# Patient Record
Sex: FEMALE | Race: WHITE | NOT HISPANIC OR LATINO | Employment: OTHER | URBAN - METROPOLITAN AREA
[De-identification: names, ages, dates, MRNs, and addresses within clinical notes are randomized per-mention and may not be internally consistent; named-entity substitution may affect disease eponyms.]

---

## 2017-07-26 DIAGNOSIS — G40.909 EPILEPSY WITHOUT STATUS EPILEPTICUS, NOT INTRACTABLE (HCC): ICD-10-CM

## 2017-08-15 ENCOUNTER — ALLSCRIPTS OFFICE VISIT (OUTPATIENT)
Dept: OTHER | Facility: OTHER | Age: 52
End: 2017-08-15

## 2017-08-17 ENCOUNTER — GENERIC CONVERSION - ENCOUNTER (OUTPATIENT)
Dept: OTHER | Facility: OTHER | Age: 52
End: 2017-08-17

## 2017-08-17 LAB
BASOPHILS # BLD AUTO: 0.1 X10E3/UL (ref 0–0.2)
BASOPHILS # BLD AUTO: 1 %
DEPRECATED RDW RBC AUTO: 13.4 % (ref 12.3–15.4)
EOSINOPHIL # BLD AUTO: 0.3 X10E3/UL (ref 0–0.4)
EOSINOPHIL # BLD AUTO: 4 %
HCT VFR BLD AUTO: 42.9 % (ref 34–46.6)
HGB BLD-MCNC: 14.3 G/DL (ref 11.1–15.9)
IMM.GRANULOCYTES (CD4/8) (HISTORICAL): 0.1 X10E3/UL (ref 0–0.1)
IMM.GRANULOCYTES (CD4/8) (HISTORICAL): 1 %
LYMPHOCYTES # BLD AUTO: 2 X10E3/UL (ref 0.7–3.1)
LYMPHOCYTES # BLD AUTO: 26 %
MCH RBC QN AUTO: 29.5 PG (ref 26.6–33)
MCHC RBC AUTO-ENTMCNC: 33.3 G/DL (ref 31.5–35.7)
MCV RBC AUTO: 89 FL (ref 79–97)
MONOCYTES # BLD AUTO: 0.3 X10E3/UL (ref 0.1–0.9)
MONOCYTES (HISTORICAL): 4 %
NEUTROPHILS # BLD AUTO: 4.9 X10E3/UL (ref 1.4–7)
NEUTROPHILS # BLD AUTO: 64 %
PLATELET # BLD AUTO: 324 X10E3/UL (ref 150–379)
RBC (HISTORICAL): 4.85 X10E6/UL (ref 3.77–5.28)
WBC # BLD AUTO: 7.6 X10E3/UL (ref 3.4–10.8)

## 2017-08-18 LAB
A/G RATIO (HISTORICAL): 1.7 (ref 1.2–2.2)
ALBUMIN SERPL BCP-MCNC: 4.8 G/DL (ref 3.5–5.5)
ALP SERPL-CCNC: 129 IU/L (ref 39–117)
ALT SERPL W P-5'-P-CCNC: 23 IU/L (ref 0–32)
AST SERPL W P-5'-P-CCNC: 21 IU/L (ref 0–40)
BILIRUB SERPL-MCNC: 0.2 MG/DL (ref 0–1.2)
BUN SERPL-MCNC: 14 MG/DL (ref 6–24)
BUN/CREA RATIO (HISTORICAL): 17 (ref 9–23)
CALCIUM SERPL-MCNC: 10.1 MG/DL (ref 8.7–10.2)
CHLORIDE SERPL-SCNC: 100 MMOL/L (ref 96–106)
CO2 SERPL-SCNC: 24 MMOL/L (ref 18–29)
CREAT SERPL-MCNC: 0.83 MG/DL (ref 0.57–1)
EGFR AFRICAN AMERICAN (HISTORICAL): 94 ML/MIN/1.73
EGFR-AMERICAN CALC (HISTORICAL): 82 ML/MIN/1.73
GLUCOSE SERPL-MCNC: 139 MG/DL (ref 65–99)
POTASSIUM SERPL-SCNC: 4.7 MMOL/L (ref 3.5–5.2)
SODIUM SERPL-SCNC: 142 MMOL/L (ref 134–144)
TOT. GLOBULIN, SERUM (HISTORICAL): 2.9 G/DL (ref 1.5–4.5)
TOTAL PROTEIN (HISTORICAL): 7.7 G/DL (ref 6–8.5)

## 2017-08-19 LAB — LAMOTRIGINE LEVEL (HISTORICAL): 9.1 UG/ML (ref 2–20)

## 2017-08-20 LAB — OXCARBAZEPINE (HISTORICAL): 12 UG/ML (ref 10–35)

## 2017-10-26 ENCOUNTER — ALLSCRIPTS OFFICE VISIT (OUTPATIENT)
Dept: OTHER | Facility: OTHER | Age: 52
End: 2017-10-26

## 2017-11-09 DIAGNOSIS — G40.119 LOCALZ-RLTD SYMPTOMATIC EPILEPSY W SMPL PART SZ, INTRACT, WO STATUS (HCC): ICD-10-CM

## 2017-11-13 ENCOUNTER — GENERIC CONVERSION - ENCOUNTER (OUTPATIENT)
Dept: OTHER | Facility: OTHER | Age: 52
End: 2017-11-13

## 2017-11-14 LAB — SODIUM SERPL-SCNC: 142 MMOL/L (ref 134–144)

## 2017-11-15 LAB
LAMOTRIGINE LEVEL (HISTORICAL): 8.2 UG/ML (ref 2–20)
OXCARBAZEPINE (HISTORICAL): 9 UG/ML (ref 10–35)
TOPIRAMATE LEVEL (HISTORICAL): 3 UG/ML (ref 2–25)

## 2017-11-16 LAB
BACTERIA UR QL AUTO: ABNORMAL
BILIRUB UR QL STRIP: NEGATIVE
COLOR UR: YELLOW
COMMENT (HISTORICAL): CLEAR
CULTURE RESULT (HISTORICAL): ABNORMAL
FECAL OCCULT BLOOD DIAGNOSTIC (HISTORICAL): NEGATIVE
GLUCOSE (HISTORICAL): NEGATIVE
KETONES UR STRIP-MCNC: NEGATIVE MG/DL
LEUKOCYTE ESTERASE UR QL STRIP: ABNORMAL
MICROSCOPIC EXAMINATION (HISTORICAL): ABNORMAL
MISCELLANEOUS LAB TEST RESULT (HISTORICAL): ABNORMAL
MUCUS THREADS (HISTORICAL): PRESENT
NITRITE UR QL STRIP: POSITIVE
NON-SQ EPI CELLS URNS QL MICRO: ABNORMAL /HPF
PH UR STRIP.AUTO: 6.5 [PH] (ref 5–7.5)
PROT UR STRIP-MCNC: NEGATIVE MG/DL
RBC (HISTORICAL): ABNORMAL /HPF
SP GR UR STRIP.AUTO: 1.01 (ref 1–1.03)
URINALYSIS (UA) (HISTORICAL): ABNORMAL
UROBILINOGEN UR QL STRIP.AUTO: 0.2 EU/DL (ref 0.2–1)
WBC # BLD AUTO: >30 /HPF

## 2017-11-21 ENCOUNTER — GENERIC CONVERSION - ENCOUNTER (OUTPATIENT)
Dept: OTHER | Facility: OTHER | Age: 52
End: 2017-11-21

## 2017-11-22 NOTE — PROGRESS NOTES
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Y #[id number] Last Updated By: System, [place]; 10/26/2017 12:39:15 PM   · Start: Topiramate 25 MG Oral Tablet; take 2 tablet twice daily  Rx By: Carole Guadarrama; Dispense: 90 Days ; #:360 Tablet; Refill: 3;For: Intractable localization-related epilepsy; JAYLA = N; Print Rx   · (1) LAMICTAL; Status:Active; Requested UYN:[id number];   Perform:[facility]; QVO:[id number]; Ordered; For:Intractable localization-related epilepsy; Ordered By:DePadua, Lizbeth;   · (1) OXCARBAZEPINE ( TRILEPTAL); Status:Active; Requested ILW:[id number];   Perform:[facility]; ETN:[id number]; Ordered; For:Intractable localization-related epilepsy; Ordered By:DePadua, Lizbeth;   · (1) SODIUM; Status:Active; Requested YHF:[id number];   Perform:[facility]; GZN:[id number]; Ordered; For:Intractable localization-related epilepsy; Ordered By:DePadua, Lizbeth;   · (1) TOPIRAMATE; Status:Active; Requested HIT:[id number];   Perform:[facility]; CIM:[id number]; Ordered; For:Intractable localization-related epilepsy; Ordered By:DePadua, Jennifer;   · (1) URINALYSIS (will reflex a microscopy if leukocytes, occult blood, protein or nitrites arenot within normal limits); Status:Active; Requested HEIDE:[id number];   Perform:[facility]; GZA:[id number]; Ordered; For:Intractable localization-related epilepsy;  Ordered By:DePadua, Lizbeth;   · Follow-up visit in 2 months Evaluation and Treatment  Follow-up  Status: Complete Done: 80VFR0299  Ordered; For: Intractable localization-related epilepsy; Ordered By: Adriana Boxer  Performed:   Due: 27FXF4622; Last Updated By: Radha Sim; 10/26/2017 12:46:55 PM    Discussion/Summary  Discussion Summary:   Very pleasant 47year old who developed localization-related epilepsy in conjunction with pregnancy when she was 33years old  We have the records going back to 2014, but not the records from before that  She had a left temporal lobe resection at [address] [place] [address] in 2007 so presumably, she had an extensive pre-surgical evaluation prior to surgery  The surgery was successful in that she was seizure free for a few years and it eliminated her generalized tonic clonic seizures, but she has continued to have 1-2 complex partial seizures a year plus 1-2 simple partial seizures (âaurasâ) a year, as well  Available records and patientâs statements indicate she had been on carbamazepine, Topamax, and pregabalin in the past  She recalls Topamax having been ineffective but it was used prior to her surgery  She liked the side effects of Topamax in that it helped her to lose weightShe has been on Lamictal since 2013, and it has been helpful though likely cannot be increased beyond her current dose of 200 mg BID since when she was on 400 mg BID she had a level of 18 and had intolerable dizziness  After the Lamictal was reduced, oxcarbazepine was added and seemed to have helped, as well, but that combination has not eliminated her seizures  Last AED levels from August 2017 were Lamictal level 9 1 and oxcarbazepine level 12   For oxcarbazepine to truly exert its full anti-seizure effect, level need to be at least 25, but because Lamictal and oxcarbazepine have a pharmacodynamic interaction i e , they both cause dizziness, oxcarbazepine cannot be increased beyond its current dose for as long as she is on Lamictal  As it is, the patient already has dizzy spells on the combination she is currently on  Fortunately, nearly all the seizures the patient has had since her temporal lobectomy have been nocturnal, so they have not required suspension of her driving privileges  The one exception being the last seizure that occurred in the daytime in February 2017  On the one hand, patient is pleased at the decrease in seizure frequency since her surgery and with her current medications  She does appear to suffer from some degree of depression, which is partly related to a feeling of relative isolation in she doesnât know anyone else with seizures and has no one with whom she can share concerns who would fully understand what she is experiencing first hand  I recommended that she go to the [address] [place] [address] website to find out more about the [address] [place] [address] support group or to at least make online contact with other epilepsy patients, and that she attend to the annual [address] [facility] conference so she could meet other patients with epilepsy  Unfortunately, because of where she lives, it would be difficult to attend the [facility] support group that meets once a month  As far as what can be done for her seizures there are a few options:1) It is possible that the seizures she continues to originate from the residual epileptogenic tissue in left temporal lobe, which, if it can be resected without causing language problems, could eliminate her remaining seizures  It is difficult to tell from the patientâs seizures' 1  described semiology whether they do still originate from the left side versus whether, 1  because of her continued seizures, she has kindled a new seizure focus in the right temporal lobe  The latter is more likely since she can talk during her seizures and during her postictal state  If the seizures do originate from the right temporal lobe then resective surgery would not be recommended 1  since she would lose all memory function   To even determine if she was a candidate for epilepsy surgery, not only would she require standard evaluation and video EEG monitoring with scalp electrodes, she would also require phase 2 monitoring with intracranial electrodes  Patient is not as open to that option which is understandable since she only has 2 seizures per year  2) The option that I believe should be the patientâs first choice is implantation of a vagus nerve stimulator  Having failed at least 3 seizure medications, an attempt to provide control with another is less likely to be successful than implantation of the device  It appears that the whole idea of having an implanted device to control seizures is novel to the patient  She has never heard of the device before and found the option strange, if not amusing  She is willing to look into this option and I gave her information for her to review  We can discuss this further in follow-up or if she decides on her own that she wants to proceed with implantation she can call and we can set up an appointment to see Dr Gurrola Later  3) In the meantime, there may be some benefit gained from altering her AED regimen  As noted above, the effectiveness of the dosing of Lamictal and oxcarbazepine is limited because of their pharmacodynamic interaction which limits our ability to optimize the benefit from oxcarbazepine  On the other hand, the patient mentioned that she really liked the side effects of Topamax and when she was on it in the past she lost weight and wants to lose more  It dd not work well when she tried it before, but that was before her left temporal lobe was taken out and could be of more benefit now with at least some of her epileptogenic tissue having been removed    I started her on topiramate 25 mg BID for one week then increased the dose to 50 mg twice a day thereafter  In 2 weeks she is to get AED levels drawn   Once we achieve a topiramate level of 4, I will give her instructions for gradually tapering off oxcarbazepine  Note that the patient has a history of kidney stones and topiramate can increase that risk  Patient was made aware of this, but would like to proceed with medication transition anyway  1  I will endeavor to keep her topiramate dose low and the patient promises to remain well hydrated  Urinalysis will be completed periodically so if she develops stones, we would pick it up early  I will see the patient back in about 2 months  Plan:1) Start topiramate 25 mg BID x1 week, then increase to 50 mg BID2) AED levels in 2 weeks3) Patient to review VNS literature    Counseling Documentation With Imm: The patient was counseled regarding instructions for management,-- risk factor reductions,-- prognosis,-- patient and family education,-- risks and benefits of treatment options,-- importance of compliance with treatment  total time of encounter was 100 minutes-- and-- 70 minutes was spent counseling  Time in 11:00, time out 12:40       1 Amended By: Gabi Gregory; Dec 14 2017 11:03 AM EST    Chief Complaint  Chief Complaint Free Text Note Form: Patient is here today for a follow up for her seizures      History of Present Illness  HPI: 47year old right-handed female with a history of seizures starting in 1997 and left temporal lobectomy in 2007 who was referred to me by Dr Zo Ramirez for evaluation and management of her seizure disorder  In order to arrive at the most accurate diagnosis of the nature of the patientâs episodes and determination of seizure type and epilepsy syndrome, and to develop the best possible individualized treatment plan, an extended amount of face-to-face time was necessary for reviewing the patientâs history with patient and available family members and friends   Information required included but was not limited to a detailed description of the seizures and seizure-like episodes and their response to previous treatments, of results of previous epilepsy work-up, of risk factors for epilepsy, and a discussion of the patientâs medical problems as they relate to the patientâs seizures and may affect treatment choices  Information was obtained both de elvie from the patient and available family and friends, and from review of available previous records, information from which was reviewed face-to-face with the patient for both clarification and confirmation  Patient had her first seizure in 0 when she was 33years old and pregnant with her daughter  She was not placed on any medication because she was pregnant  She continued to have a number of different types of seizures including grand mal and petit mal  In 2007, she underwent a left temporal lobectomy for seizure management at [facility]  She reports that she used to have seizures every day prior to her temporal lobectomy, then became seizure-free for several months post-op  Unfortunately, seizures eventually recurred  Fortunately, the recurrent seizures were exclusively nocturnal and were, in her words, âlittleâ  Patient had a seizure in October 2013 where she woke up feeling dizzy with blurriness of vision, she had bitten her tongue and found she had urinary incontinence  Patient felt she was back to her baseline within an hour  Lamictal was added to her medication regimen on 12/10/2013  In January 2014, she was on Lamictal 200 mg 2 tabs BID  Patient had a seizure while asleep in June 2014 lasting from a few seconds to a minute with tongue biting  Denied incontinence or injuries  On 7/18/14 the patient was on Lamictal 200 mg 2 tabs BID and level was 18 2  Lamictal was decreased to 200-400 for one week then 200 mg BID  Patient was started on oxcarbazepine 150 mg BID for one week then increased to 300 mg BID  Patient states that she noticed some memory difficulty after her left temporal lobectomy  Neuropsychological testing was performed at [facility] by Beka Phipps, PHD on 2/22/16   MMSE score was a 29/30 with no deficits noted on simple measures of comprehension, reading, or writing  Wechsler Adult Intelligence Scale-IV score was 83 which placed her within the low to average range; she notable had lower scores on tasks assessing ability to respond orally to a series of word pairs, answer questions about common events, objects, places, and people  Vocabulary, working memory, and nonverbal reasoning fell within the average range  Patient headaches strengths in perceptual reasoning  Memory testing showed mild weakness in short-term memory evident more on single exposure to complex partial seizures auditory information and on measures of visual recall  Diminished word finding, object naming, slower visual scanning but intact executive functioning  Moderate likelihood that she may have underlying attentional problems  She was recommended for outpatient speech therapy  Patient had a questionable aura of a âweird feelingâ during the night but was able to abort it and no further seizure activity occurred  She saw Dr Bulmaro Curry on 3/11/16 who increased her oxcarbazepine from 300 mg BID to 300-450  Patientâs last visit with Dr Bulmaro Curry was on 8/15/17  She reported that her last seizure had been 2-3 years prior, and no changes were made in the patientâs medications and  Today, however, contrary to what she told Dr Bulmaro Curry, the patient reports that she has had 2 seizures this year  The first occurred on 1/5/17 while she was sleeping  She knew that she had a seizure because when she woke up she had a âweird feelingâ and had confusion that lasted about 15 minutes  The last seizure she had was in February and lasted 2 minutes  She was in the kitchen when her daughter saw her starting to fall and lowered her to the floor  Patient believes it was stress related  This is the first daytime seizure that had occurred since her brain surgery  The patient states her average seizure frequency as being about 1-2 times per year Witnesses have described the patient's seizures as consisting of the patient staring off into space  She drools, and she talks but it does not make sense  Events, including a period of confusion, reportedly last 15-20 minutes  When the patient wakes up in the morning, she can only tell if she's had a seizure during the night if she awakens drooling or with a weird feeling  Patient sometimes has a feeling as if a seizure is going to happen, but the feeling does not progress to a seizure  Feelings occur both in the daytime and at night  Patient feels that if she sits down and try to relax when she gets this feeling, the seizure will not happen  She did not experience this warning sign prior to the daytime seizure that she had in February, however  Reports side effects of dizziness and weight gain on her current medications  She notes that her memory issue is bothersome to her but she knows it is due to her epilepsy and brain surgery  Seizure Types:1  Grand mal and petite mal seizures that started when she was 33years old in  when she was pregnant  These seizures have not occurred since temporal lobectomy in   Convulsions would occur occasionally and petite mals would happen daily  2  Nocturnal seizures during which patient will wake up feeling dizzy and have blurry vision as well as a âfunny feelingâ  She does not know how long it lasts due to her being asleep but she estimates that the seizure plus postictal confusion will last about 15-20 minutes  She will typically have 1-2 of these events per year  3  One daytime seizure in 2017 where she stared off into the distance, experienced drooling, she talked but it did not make sense, and she began to fall   Seizure lasted 2 minutes  Risk Factors for Epilepsy:Prenatal and  histories were normal  No history of febrile seizures  No family history of seizures  No known history of head injury   No known history of stroke, tumor, or central nervous system infection  Denies history of head trauma prior to  but has had seizures that have caused her to sustain head injury such as one time she hit her head on the wall  Seizure Triggers:Lack of sleep, stress, alcohol, a lot of sugar in her diet, caffeine (she drinks 2-4 cups per day), menstrual cycle (?)Previous Work-up:EEG on 14Impression: this is a normal routine EEG  If a seizure disorder is considered clinically a repeat tracing with sleep deprivation may be of additional diagnostic value  AED level on 11/19/15Oxcarbazepine 7Lamotrigine 13  3MRI on 12/1/15Impression: no evidence of acute intracranial abnormality  No abnormal enhancement within the brain parenchyma  Stable postoperative changes or a prior left frontotemporal craniotomy with a left temporal fossa resection cavity  The findings have not significantly changed when compared to a prior MRI of the brain dated 11  AED level on 16Oxcarbazepine 12Lamotrigine 8  4EEG on 3/21/16Impression: this is a normal routine EEG  If a seizure disorder is considered clinically a repeat tracing with sleep deprivation may be of additional diagnostic value  AED level on 17Oxcarbazepine 12Lamotrigine 9 1Current Treatment:Lamotrigine 200 mg tablets, 1 tab BIDOxcarbazepine 300 mg tablets, 1 tab in AM, 1 5 tab in PMPrevious Medical and Surgical treatments for Epilepsy:Tegretol, Topamax, and LyricaShe does not believe she tried the following: Phenobarbital, phenytoin, zonisamide, Keppra, gabapentinAllergies:No known drug allergiesPMHx:1  Kidney stones ()2  Epilepsy3  RosaceaSurgical Hx:Patient has a surgical history of brain surgery in , parathyroid resection sub-total parathyroid in  and , and tonsillectomySHx:Patient is currently on the Depo-Provera shot  Patient has three children: a 25year old, a 21year old, and an 6year old  All have developed normally  FHx:Family history of diabetes mellitus (mother) and brain cancer (father, ) Review of Systems  Neurological ROS:  Constitutional: as noted in HPI  HEENT: as noted in HPI  Cardiovascular: as noted in HPI  Respiratory: as noted in HPI  Gastrointestinal: as noted in HPI  Genitourinary: as noted in HPI  Musculoskeletal: as noted in HPI  Integumentary as noted in HPI  Psychiatric: as noted in HPI  Endocrine as noted in HPI  Hematologic/Lymphatic: as noted in HPI  Neurological General: as noted in HPI  Neurological Mental Status: as noted in HPI  Neurological Cranial Nerves: as noted in HPI  Neurological Motor findings include: as noted in HPI  Neurological Coordination: as noted in HPI  Neurological Sensory: as noted in HPI  Neurological Gait: as noted in HPI  ROS Reviewed:   ROS reviewed  Active Problems  1  Contact dermatitis due to chemicals (692 4) (L25 3)  2  Encounter for procedure  3  Epilepsy (345 90) (G40 909)  4  Rosacea (695 3) (L71 9)  5  Screening for skin condition (V82 0) (Z13 89)  6  Verrucae vulgaris (078 10) (B07 9)    Past Medical History  1  History of kidney stones (V13 01) (G44 595)  Active Problems And Past Medical History Reviewed: The active problems and past medical history were reviewed and updated today  Surgical History  1  History of Brain Surgery  2  History of Parathyroid Resection Sub-Total Parathyroidectomy  3  History of Tonsillectomy  Surgical History Reviewed: The surgical history was reviewed and updated today  Family History  Mother   1  Family history of diabetes mellitus (V18 0) (Z83 3)  Father   2  Family history of Accidental death  Family History Reviewed: The family history was reviewed and updated today  Social History   · Disabled   · Housewife or homemaker   ·    · Never a smoker  Social History Reviewed: The social history was reviewed and updated today  The social history was reviewed and is unchanged  Current Meds  1   LamoTRIgine 200 MG Oral Tablet; take 1 tablet twice a day; Therapy: 64DTN6236 to (Evaluate:96Omh4705)  Requested for: 13Fwz6916; Last Rx:30Hvz5862 Ordered  2  OXcarbazepine 300 MG Oral Tablet; TAKE 1 TABLET IN THE MORNING AND ONE AND ONE-HALF TABLETS AT NIGHT; Therapy: 93IEE0315 to (Last Rx:16Lag9973)  Requested for: 54Rcq7464 Ordered  Medication List Reviewed: The medication list was reviewed and updated today  Allergies  1  No Known Drug Allergies  2  No Known Environmental Allergies  3  No Known Food Allergies    Vitals  Signs   Recorded: 20EDM7552 11:34AM   Heart Rate: 83  Respiration: 18  Systolic: 760  Diastolic: 98  Height: 5 ft   Weight: 177 lb 6 oz  BMI Calculated: 34 64  BSA Calculated: 1 77  O2 Saturation: 99    Physical Exam   Constitutional  General Appearance: Appears appropriate for age, healthy, well developed, appropriately groomed and appropriately dressed    External inspection of ears and nose: Normal      Neck  Neck and thyroid: Normal to inspection and palpation  Pulmonary  Respiratory effort: Lungs are clear bilaterally  Cardiovascular  Auscultation of heart: Rate is regular  Rhythm is regular  Carotid pulses: Normal, 2+ bilaterally  Abdomen: Positive bowel sounds  Musculoskeletal  Gait and Station: Walks with normal gait  Tandem walk test is normal  Romberg's test is negative  Muscle strength: Normal strength throughout  Muscle tone: No atrophy, abnormal movements, flaccidity, cogwheeling or spasticity  Involuntary movements: None observed  Range of motion: Normal     Stability: Normal     Inspection of temporomandibular joint appears normal    Neurologic  Orientation to person, place, and time: Normal    Attention span and concentration: Normal thought process and attention span  Language: Names objects, able to repeat phrases and speaks spontaneously  Fund of knowledge: Normal vocabulary with appropriate knowledge of current events and past history     Sensation: Intact sensation to pinprick, temperature, vibration, and proprioception in all four extremities  Reflexes: DTR's are normal and symmetric bilaterally  Babinski's reflex is negative bilaterally  No pathologic ankle clonus  Coordination: Cerebellum function intact  No involuntary movement or psychomotor activity  Motor System: No pronator drift  Upper Extremities: Normal to inspection  No tenderness over the upper extremities bilaterally  No instability bilaterally  Strength: Motor strength is 5/5 bilaterally  Normal muscle tone bilaterally  Muscle bulk: Muscle bulk is normal bilaterally  Full ROM bilaterally  Lower Extremities: Normal to inspection and palpation  No tenderness of the lower extremities bilaterally  Exhibits no instability bilaterally  Strength: Motor strength is 5/5 bilaterally  Normal muscle tone bilaterally  Muscle Bulk: Muscle bulk is normal bilaterally  Full ROM bilaterally  Cranial Nerve Exam  II: Normal with no deficit  III,IV, VI: Normal with no deficit  V: Normal with no deficit  VII: Normal with no deficit  VIII: Normal with no deficit  IX: Normal with no deficit  X: Normal with no deficit  XI: Normal with no deficit  XII: Normal with no deficit  Recent and remote memory: Intact  Mood and affect: Normal        Attending Note  Scribe Attestation:  Scribe Attestation Patel MELENDEZ am acting as a scribe in the presence of the attending physician while the attending physician examines the patient  Physician Attestation:  Janene Muniz personally performed the services described in this documentation as scribed in my presence, and it is both accurate and complete  Future Appointments    Date/Time Provider Specialty Site   01/08/2018 10:00 AM Beverly Ganser, M D   [facility]   01/03/2018 09:00 AM Hieu Rodriguez MD [facility]rology [facility]     Signatures   Electronically signed by : SIMRAN Golden ; Nov 21 2017  4:15PM EST                       (Author) Electronically signed by : SIMRAN Potter ; Nov 21 2017  4:20PM EST                       (Author)    Electronically signed by : SIMRAN Potter ; Dec 14 2017 11:03AM EST                       (Author)

## 2017-12-12 ENCOUNTER — GENERIC CONVERSION - ENCOUNTER (OUTPATIENT)
Dept: OTHER | Facility: OTHER | Age: 52
End: 2017-12-12

## 2017-12-14 ENCOUNTER — GENERIC CONVERSION - ENCOUNTER (OUTPATIENT)
Dept: OTHER | Facility: OTHER | Age: 52
End: 2017-12-14

## 2017-12-14 LAB
LAMOTRIGINE LEVEL (HISTORICAL): 8.4 UG/ML (ref 2–20)
OXCARBAZEPINE (HISTORICAL): 9 UG/ML (ref 10–35)
TOPIRAMATE LEVEL (HISTORICAL): 5.1 UG/ML (ref 2–25)

## 2017-12-15 ENCOUNTER — GENERIC CONVERSION - ENCOUNTER (OUTPATIENT)
Dept: OTHER | Facility: OTHER | Age: 52
End: 2017-12-15

## 2018-01-11 NOTE — RESULT NOTES
Verified Results  (LC) Lamotrigine (Lamictal), Serum [id number] 10:04AM Josey Given     Test Name Result Flag Reference   Lamotrigine, Serum 8 4 ug/mL  2 0-20 0   Detection Limit = 1 0

## 2018-01-11 NOTE — RESULT NOTES
Verified Results  [facility]) Susan Moran UFU81 Default [id number] 09:08AM Kelli Dong     Test Name Result Flag Reference   Susan Moran CMP14 Default Comment     A hand-written panel/profile was received from your office  In  accordance with the [facility] Ambiguous Test Code Policy dated July 2256, we have completed your order by using the closest currently  or formerly recognized AMA panel  We have assigned Comprehensive  Metabolic Panel (14), Test Code #291350 to this request   If this  is not the testing you wished to receive on this specimen, please  contact the [address] [place] [address] Client Inquiry/Technical Services Department  to clarify the test order  We appreciate your business       (1) COMPREHENSIVE METABOLIC PANEL [id number] 03:85MG Kelli Dong     Test Name Result Flag Reference   Glucose, Serum 102 mg/dL H 65-99   BUN 13 mg/dL  6-24   Creatinine, Serum 0 80 mg/dL  0 57-1 00   eGFR If NonAfricn Am 86 mL/min/1 73  >59   eGFR If Africn Am 99 mL/min/1 73  >59   BUN/Creatinine Ratio 16  9-23   Sodium, Serum 140 mmol/L  134-144   Potassium, Serum 4 7 mmol/L  3 5-5 2   Chloride, Serum 103 mmol/L     Carbon Dioxide, Total 22 mmol/L  18-29   Calcium, Serum 9 5 mg/dL  8 7-10 2   Protein, Total, Serum 7 7 g/dL  6 0-8 5   Albumin, Serum 4 9 g/dL  3 5-5 5   Globulin, Total 2 8 g/dL  1 5-4 5   A/G Ratio 1 8  1 1-2 5   Bilirubin, Total 0 2 mg/dL  0 0-1 2   Alkaline Phosphatase, S 115 IU/L     AST (SGOT) 12 IU/L  0-40   ALT (SGPT) 12 IU/L  0-32

## 2018-01-13 VITALS
BODY MASS INDEX: 25.76 KG/M2 | WEIGHT: 170 LBS | DIASTOLIC BLOOD PRESSURE: 98 MMHG | HEART RATE: 78 BPM | SYSTOLIC BLOOD PRESSURE: 162 MMHG | HEIGHT: 68 IN | RESPIRATION RATE: 14 BRPM

## 2018-01-14 VITALS
DIASTOLIC BLOOD PRESSURE: 98 MMHG | BODY MASS INDEX: 34.83 KG/M2 | SYSTOLIC BLOOD PRESSURE: 148 MMHG | RESPIRATION RATE: 18 BRPM | OXYGEN SATURATION: 99 % | HEIGHT: 60 IN | HEART RATE: 83 BPM | WEIGHT: 177.38 LBS

## 2018-01-15 NOTE — RESULT NOTES
Verified Results  (LC) Oxcarbazepine (Trileptal),S [id number] 10:04AM Charisse Ambrosio     Test Name Result Flag Reference   Oxcarbazepine 12 ug/mL  10-35   Detection Limit = 1

## 2018-01-16 NOTE — RESULT NOTES
Verified Results  (1) SODIUM [id number] 09:01AM Jerry Poot     Test Name Result Flag Reference   Sodium, Serum 142 mmol/L  134-144     (1) TOPIRAMATE [id number] 09:01AM Jerry Poot     Test Name Result Flag Reference   Topiramate, Serum 3 0 ug/mL  2 0-25 0   Detection Limit = 1 0     (LC) Oxcarbazepine (Trileptal),S [id number] 09:01AM Jerry Poot     Test Name Result Flag Reference   Oxcarbazepine 9 ug/mL L 10-35   Detection Limit = 1     (LC) Lamotrigine (Lamictal), Serum [id number] 09:01AM Jerry Poot     Test Name Result Flag Reference   Lamotrigine, Serum 8 2 ug/mL  2 0-20 0   Detection Limit = 1 0     (LC) UA/M w/rflx Culture, Routine [id number] 09:01AM DePadua, Lizbeth     Test Name Result Flag Reference   Specific Gravity 1 014  1 005-1 030   pH 6 5  5 0-7 5   Urine-Color Yellow  Yellow   Appearance Clear  Clear   WBC Esterase 3+ A Negative   Protein Negative  Negative/Trace   Glucose Negative  Negative   Ketones Negative  Negative   Occult Blood Negative  Negative   Bilirubin Negative  Negative   Urobilinogen,Semi-Qn 0 2 EU/dL  0 2-1 0   Nitrite, Urine Positive A Negative   Microscopic Examination See below:     Urinalysis Reflex      This specimen has reflexed to a Urine Culture  This specimen has reflexed to a Urine Culture  WBC >30 /hpf A 0 -  5   RBC 0-2 /hpf  0 -  2   Epithelial Cells (non renal) 0-10 /hpf  0 - 10   Mucus Threads Present  Not Estab  Bacteria Few  None seen/Few   Urine Culture, Routine Final report A    Result 1 Escherichia coli A    Greater than 100,000 colony forming units per mL  Cefazolin <=4 ug/mL  Cefazolin with an HARRY <=16 predicts susceptibility to the oral agents  cefaclor, cefdinir, cefpodoxime, cefprozil, cefuroxime, cephalexin,  and loracarbef when used for therapy of uncomplicated urinary tract  infections due to E  coli, Klebsiella pneumoniae, and Proteus  mirabilis     Antimicrobial Susceptibility (Report)     ** S = Susceptible; I = Intermediate; R = Resistant **            P = Positive; N = Negative        MICS are expressed in micrograms per mL    Antibiotic         RSLT#1  RSLT#2  RSLT#3  RSLT#4  Amoxicillin/Clavulanic Acid  S  Ampicillin           S  Cefepime            S  Ceftriaxone          S  Cefuroxime           S  Cephalothin          S  Ciprofloxacin         S  Ertapenem           S  Gentamicin           S  Imipenem            S  Levofloxacin          S  Nitrofurantoin         S  Piperacillin          S  Tetracycline          R  Tobramycin           S  Trimethoprim/Sulfa       S   ** S = Susceptible; I = Intermediate; R = Resistant **                     P = Positive; N = Negative              MICS are expressed in micrograms per mL     Antibiotic                 RSLT#1    RSLT#2    RSLT#3    RSLT#4  Amoxicillin/Clavulanic Acid    S  Ampicillin                     S  Cefepime                       S  Ceftriaxone                    S  Cefuroxime                     S  Cephalothin                    S  Ciprofloxacin                  S  Ertapenem                      S  Gentamicin                     S  Imipenem                       S  Levofloxacin                   S  Nitrofurantoin                 S  Piperacillin                   S  Tetracycline                   R  Tobramycin                     S  Trimethoprim/Sulfa             S

## 2018-01-16 NOTE — PROCEDURES
Results/Data  Procedure: Electroencephalography (EEG)   Indications for the procedure include epilepsy  Were discussed with the patient  Written consent was obtained prior to the procedure and is detailed in the patient's record  Prior to the start of the procedure, a time out was taken and the identity of the patient was confirmed via name and date of birth with the patient  The correct site(s) and the procedure to be performed were confirmed and the site(s) were marked appropriately  The positioning of the patient and the availabilty of the correct equipment were verified  Certain medications (such as anticonvulsants and tranquilizers), stimulants, and alcohol were avoided for at least 24-48 hours prior to the procedure  Procedure Note:   Performed by: Lawrence Milligan  Start Time: 10:00  End Time: 10:30   Electrode(s) Placement: Fp1, Fp2, F7, F3, Fz, F4, F8, T3, C3, CZ, C4, T4, T5, T6, P3, PZ, P4, O1, O2, A1 and A2  They were placed in a bipolar montage, referential montage, average reference montage, laplacian montage  The EEG was performed while the patient was exposed to of photic stimulation, hyperventilated and awake and drowsy, but not sedated  Findings: EEG    This is a routine 18 channel EEG recording performed on a 52-year-old woman with a history of epilepsy   Background activities during wakefulness consist of mid amplitude 9 cycle per second activities emanating from the posterior head region  These activities are reactive to eye opening  Intermingled with background activities are a moderate amount of lower amplitude beta activities emanating from the frontocentral head regions  Episodes of drowsiness and sleep are not seen    Photic stimulation was performed over a wide range of flash frequencies and produced a symmetrical driving response  Hyperventilation added no additional information  Concomitant EKG revealed a sinus rhythm  IMPRESSION: This is a normal routine EEG   If a seizure disorder is considered clinically a repeat tracing with sleep deprivation may be of additional diagnostic value    SIMRAN Brown  Impression:    Post-Procedure:   the patient tolerated the procedure well  Complications: There were no complications        Signatures   Electronically signed by : Yefri Terrazas MD; Mar 21 2016  1:50PM EST                       (Author)

## 2018-01-23 NOTE — RESULT NOTES
Verified Results  (1) TOPIRAMATE [id number] 10:27AM Maryse Bolden     Test Name Result Flag Reference   Topiramate, Serum 5 1 ug/mL  2 0-25 0   Detection Limit = 1 0

## 2018-01-23 NOTE — RESULT NOTES
Verified Results  (LC) Oxcarbazepine (Trileptal),S [id number] 10:27AM Maryse Bolden     Test Name Result Flag Reference   Oxcarbazepine 9 ug/mL L 10-35   Detection Limit = 1

## 2018-02-08 ENCOUNTER — TELEPHONE (OUTPATIENT)
Dept: NEUROLOGY | Facility: CLINIC | Age: 53
End: 2018-02-08

## 2018-03-12 DIAGNOSIS — G40.909 UNCLASSIFIED EPILEPTIC SEIZURES (HCC): Primary | ICD-10-CM

## 2018-03-12 RX ORDER — LAMOTRIGINE 200 MG/1
TABLET ORAL
Qty: 60 TABLET | Refills: 0 | Status: SHIPPED | OUTPATIENT
Start: 2018-03-12 | End: 2018-04-03 | Stop reason: SDUPTHER

## 2018-04-03 ENCOUNTER — TELEPHONE (OUTPATIENT)
Dept: NEUROLOGY | Facility: CLINIC | Age: 53
End: 2018-04-03

## 2018-04-03 ENCOUNTER — OFFICE VISIT (OUTPATIENT)
Dept: NEUROLOGY | Facility: CLINIC | Age: 53
End: 2018-04-03
Payer: COMMERCIAL

## 2018-04-03 VITALS
DIASTOLIC BLOOD PRESSURE: 84 MMHG | RESPIRATION RATE: 16 BRPM | WEIGHT: 175 LBS | HEIGHT: 68 IN | BODY MASS INDEX: 26.52 KG/M2 | HEART RATE: 94 BPM | SYSTOLIC BLOOD PRESSURE: 138 MMHG

## 2018-04-03 DIAGNOSIS — G40.219 LOCALIZATION-RELATED SYMPTOMATIC EPILEPSY AND EPILEPTIC SYNDROMES WITH COMPLEX PARTIAL SEIZURES, INTRACTABLE, WITHOUT STATUS EPILEPTICUS (HCC): Primary | ICD-10-CM

## 2018-04-03 PROBLEM — G40.019 INTRACTABLE LOCALIZATION-RELATED EPILEPSY (HCC): Status: ACTIVE | Noted: 2017-10-26

## 2018-04-03 PROCEDURE — 99214 OFFICE O/P EST MOD 30 MIN: CPT | Performed by: PSYCHIATRY & NEUROLOGY

## 2018-04-03 RX ORDER — LAMOTRIGINE 200 MG/1
200 TABLET ORAL 2 TIMES DAILY
Qty: 60 TABLET | Refills: 0 | Status: SHIPPED | OUTPATIENT
Start: 2018-04-03 | End: 2018-04-03 | Stop reason: SDUPTHER

## 2018-04-03 RX ORDER — TOPIRAMATE 100 MG/1
100 TABLET, FILM COATED ORAL 2 TIMES DAILY
Qty: 180 TABLET | Refills: 3 | Status: SHIPPED | OUTPATIENT
Start: 2018-04-03 | End: 2018-11-05 | Stop reason: SDUPTHER

## 2018-04-03 RX ORDER — LAMOTRIGINE 200 MG/1
200 TABLET ORAL 2 TIMES DAILY
Qty: 60 TABLET | Refills: 0 | Status: SHIPPED | OUTPATIENT
Start: 2018-04-03 | End: 2018-04-09 | Stop reason: SDUPTHER

## 2018-04-03 RX ORDER — OXCARBAZEPINE 300 MG/1
TABLET, FILM COATED ORAL
COMMUNITY
Start: 2017-07-26 | End: 2018-04-03 | Stop reason: SDUPTHER

## 2018-04-03 RX ORDER — TOPIRAMATE 100 MG/1
1 TABLET, FILM COATED ORAL 2 TIMES DAILY
COMMUNITY
Start: 2018-01-15 | End: 2018-04-03 | Stop reason: SDUPTHER

## 2018-04-03 RX ORDER — OXCARBAZEPINE 300 MG/1
TABLET, FILM COATED ORAL
Qty: 225 TABLET | Refills: 0
Start: 2018-04-03 | End: 2018-11-05 | Stop reason: ALTCHOICE

## 2018-04-03 NOTE — TELEPHONE ENCOUNTER
FYI:   Pt called requesting topiramate rx  [facility]  She states that she doesn't need a refill on lamictal and trileptal      Topiramate 100 mg, take 1 tablet by mouth bid was called in as ordered  Pt made aware

## 2018-04-03 NOTE — PATIENT INSTRUCTIONS
1 ) Starting today, decrease oxcarbazepine (Trileptal) dose from 300 mg tablets, 1 tablets in the morning and 1 1/2 tablets at night to 1 tablet twice daily for two weeks then decrease further to 1/2 tablet in the morning and 1 tablet at night for two weeks then decrease to 1/2 tablet twice daily for two weeks then decrease to 1/2 tablet at night for two weeks then stop taking your oxcarbazepine  2 ) Continue to take your current doses of Topamax and Lamictal     3 ) Get blood levels drawn one week after completely stopping your oxcarbazepine  4 ) If you have a seizure or an aura please contact our office  5 ) Follow up with our office in 6 months

## 2018-04-03 NOTE — LETTER
April 3, 2018     Mirella Schultz MD  [address]  [address]  [address] [place] [id number]    Patient: Jamil Forte   YOB: 1965   Date of Visit: 4/3/2018       Dear Dr August Booth: Thank you for referring Jamil Forte to me for evaluation  Below are my notes for this consultation  If you have questions, please do not hesitate to call me  I look forward to following your patient along with you  Sincerely,        Rm Machado MD        CC: No Recipients  Rm Machado MD  4/3/2018 11:40 AM  Signed  Patient ID: Jamil Forte is a 47 y o  female  Assessment/Plan:       Problem List Items Addressed This Visit     None      Visit Diagnoses     Localization-related symptomatic epilepsy and epileptic syndromes with complex partial seizures, intractable, without status epilepticus ([facility] [address] )    -  Primary    Relevant Medications    OXcarbazepine (TRILEPTAL) 300 mg tablet    topiramate (TOPAMAX) 100 mg tablet    lamoTRIgine (LaMICtal) 200 MG tablet            Discussion Summary:    Patient is doing well  She had been seizure free for over year now  The addition of topiramate and subsequent increase to 100 mg BID has not caused her any side effects  She had been on it in the past and she liked the side effect of weight loss when she took it then, but when she had been on it before she had not yet had her temporal lobectomy so it did not seem to work  I think it can add significantly to her maintenance of seizure control, and in order to reduce her overall AED burden, we will taper off oxcarbazepine whose level is subtherapeutic anyway  She was given instructions for decreasing oxcarbazepine by 1/2 of a 300 mg tablet at two week intervals such that she will be off it completely in 2 months  A week thereafter she will get repeat Lamictal and Topamax levels   She was told to call immediately should she experience even just a simple partial seizure and we can address that by increasing her topiramate dose slightly  The topiramate level is relatively low anyway so increasing it is unlikely to cause significant side effects  The only reason I did not increase it now in anticipation of the oxcarbazepine taper is I would like to keep her on as little medication as is necessary to maintain seizure freedom  She has looked into the vagus nerve stimulator more since the last visit and at this point she does not want to have a VNS implanted which is perfectly reasonable since her medications are controlling her seizures  Subjective:    HPI    6 month follow-up of a 47year old right-handed female who developed localization-related epilepsy in conjunction with pregnancy when she was 33years old in 0  She had a left temporal lobe resection at [address] [place] [address] in 2007  There are no records available prior to 2014, but it is assumed that she had a full workup prior to the temporal lobe resection  The surgery was successful in that she went from having daily seizures to being seizure free for a few years and it eliminated her generalized tonic clonic seizures  Seizures eventually recurred, but fortunately the seizures were nearly exclusively nocturnal (with only one daytime seizure occurring in February 2017), would only occur about 1-2 times per year, and, in her words, were little  Seizures were described as the patient staring off with drooling and she will know she had a seizure during the night when she would wake up with drool and have a weird feeling  She also has episodes in which she feels like she is going to have a seizure but it does not progress  Prior medications tried include carbamazepine, Topamax (ineffective prior to surgery), and pregabalin   Lamictal was added to her medication regimen on 12/10/2013, and it had been helpful, though it is likely not able to be increased past 200 mg BID because she had experienced a level of 18 and symptoms of toxicity on a dosage of 400 mg BID in 2014  In 2014, oxcarbazepine was added to the Lamictal which seemed to help but did not eliminate her seizures  Patient noted some memory difficulties after her lobe resection and she had neuropsychological testing performed at [facility] by Rosina Mcclendon, PHD on 2/22/16  MMSE score was a 29/30 with no deficits noted on simple measures of comprehension, reading, or writing  Wechsler Adult Intelligence Scale-IV score was 83 which placed her within the low to average range; she had notably lower scores on tasks assessing ability to respond orally to a series of word pairs, answer questions about common events, objects, places, and people  Vocabulary, working memory, and nonverbal reasoning fell within the average range  Patient had strengths in perceptual reasoning  Memory testing showed mild weakness in short-term memory evident more on single exposure to complex partial seizures auditory information and on measures of visual recall  Diminished word finding, object naming, slower visual scanning but intact executive functioning  There was moderate likelihood that she had underlying attentional problems  When I saw the patient for the first time on 10/26/17, she appeared to suffer from some degree of depression which was related to her feeling isolated being the only person she knew with epilepsy  I recommended for her to attend the annual [facility] conference to meet other patient with epilepsy  I also recommended her for vagus nerve stimulator implantation since she had failed at least 3 seizure medications, and I gave her information to review  I also started her on Topamax 50 mg BID and once it reached a therapeutic level we would taper her off oxcarbazepine  Last seen on 10/26/17      INTERVAL HISTORY:    AED levels on 11/13/17  Sodium 142  Oxcarbazepine 9  Lamotrigine 8 2  Topiramate 3 0    Patient was instructed on 11/21/17 to increase her topiramate dose from 50 mg BID to 100 mg BID and then was instructed to get a repeat level drawn  AED level on 12/12/17  Oxcarbazepine 9  Lamotrigine 8 4  Topiramate 5 1    Current AEDs:  Lamotrigine 200 mg tablets, 1 tab BID  Oxcarbazepine 300 mg tablets, 1 tab in AM, 1 5 tab in PM  Topamax 100 mg tablets, 1 tab BID    Patient reports she has not had any seizures since her last visit  It has been over a year since her last seizure  She denies any side effects on her Topamax  She has reviewed the vagus nerve stimulator information, but she has determined that she does not want to pursue that procedure at this time  Although she is not experiencing any side effects, she would like to try and decrease one of her medications or possibly discontinue one to help reduce her medication load  The following portions of the patient's history were reviewed and updated as appropriate: allergies, current medications, past family history, past medical history, past social history, past surgical history and problem list       Objective:    Blood pressure 138/84, pulse 94, resp  rate 16, height 5' 8" (1 727 m), weight 79 4 kg (175 lb)  Physical Exam   Constitutional: She appears well-developed and well-nourished  HENT:   Head: Normocephalic and atraumatic  Eyes: EOM are normal  Pupils are equal, round, and reactive to light  Neck: Neck supple  Cardiovascular: Normal rate and regular rhythm  Pulmonary/Chest: Effort normal    Musculoskeletal: Normal range of motion  Neurological: She is alert  She has normal strength and normal reflexes  Gait normal    Psychiatric: Her speech is normal    Vitals reviewed  Neurological Exam    Mental Status  The patient is alert and oriented to person, place, time, and situation  Her recent and remote memory are normal  Her speech is normal  Her language is fluent with no aphasia  She has normal attention span and concentration   She has a normal fund of knowledge  Cranial Nerves    CN II: The patient's visual acuity and visual fields are normal   CN III, IV, VI: The patient's pupils are equally round and reactive to light and ocular movements are normal   CN V: The patient has normal facial sensation  CN VII:  The patient has symmetric facial movement  CN VIII:  The patient's hearing is normal   CN IX, X: The patient has symmetric palate movement and normal gag reflex  CN XI: The patient's shoulder shrug strength is normal   CN XII: The patient's tongue is midline without atrophy or fasciculations  Motor   Her strength is 5/5 throughout all four extremities  Sensory  The patient's sensation is normal in all four extremities  Reflexes  Deep tendon reflexes are 2+ and symmetric in all four extremities with downgoing toes bilaterally  Gait and Coordination  The patient has normal gait and station and normal casual, toe, heel, and tandem gait  ROS:    Review of Systems   Constitutional: Negative  HENT: Negative  Eyes: Negative  Respiratory: Negative  Cardiovascular: Negative  Gastrointestinal: Negative  Endocrine: Negative  Genitourinary: Negative  Musculoskeletal: Negative  Skin: Negative  Allergic/Immunologic: Negative  Neurological: Negative  Hematological: Negative  Psychiatric/Behavioral: Negative  Counseling Documentation:  Time in: 9:30, Time out: 10:00  Total time encounter was 30 minutes and 25 minutes were spent counseling the patient  Attestation:   By signing my name below, anil Banks that this documentation has been prepared under the direction and in the presence of Magen Díaz MD  Electronically Signed: Zoraida Crigler, Scribe  04/03/18     I, Magen Díaz, personally performed the services described in this documentation  All medical record entries made by the scribe were at my direction and in my presence   I have reviewed the chart and discharge instructions and agree that the record reflects my personal performance and is accurate and complete    Aman Esteves MD  04/03/18

## 2018-04-03 NOTE — PROGRESS NOTES
Patient ID: Chanelle Xiong is a 47 y o  female  Assessment/Plan:       Problem List Items Addressed This Visit     None      Visit Diagnoses     Localization-related symptomatic epilepsy and epileptic syndromes with complex partial seizures, intractable, without status epilepticus ([facility] [address] )    -  Primary    Relevant Medications    OXcarbazepine (TRILEPTAL) 300 mg tablet    topiramate (TOPAMAX) 100 mg tablet    lamoTRIgine (LaMICtal) 200 MG tablet            Discussion Summary:    Patient is doing well  She had been seizure free for over year now  The addition of topiramate and subsequent increase to 100 mg BID has not caused her any side effects  She had been on it in the past and she liked the side effect of weight loss when she took it then, but when she had been on it before she had not yet had her temporal lobectomy so it did not seem to work  I think it can add significantly to her maintenance of seizure control, and in order to reduce her overall AED burden, we will taper off oxcarbazepine whose level is subtherapeutic anyway  She was given instructions for decreasing oxcarbazepine by 1/2 of a 300 mg tablet at two week intervals such that she will be off it completely in 2 months  A week thereafter she will get repeat Lamictal and Topamax levels  She was told to call immediately should she experience even just a simple partial seizure and we can address that by increasing her topiramate dose slightly  The topiramate level is relatively low anyway so increasing it is unlikely to cause significant side effects  The only reason I did not increase it now in anticipation of the oxcarbazepine taper is I would like to keep her on as little medication as is necessary to maintain seizure freedom  She has looked into the vagus nerve stimulator more since the last visit and at this point she does not want to have a VNS implanted which is perfectly reasonable since her medications are controlling her seizures  Subjective:    HPI    6 month follow-up of a 47year old right-handed female who developed localization-related epilepsy in conjunction with pregnancy when she was 33years old in 0  She had a left temporal lobe resection at [address] [place] [address] in 2007  There are no records available prior to 2014, but it is assumed that she had a full workup prior to the temporal lobe resection  The surgery was successful in that she went from having daily seizures to being seizure free for a few years and it eliminated her generalized tonic clonic seizures  Seizures eventually recurred, but fortunately the seizures were nearly exclusively nocturnal (with only one daytime seizure occurring in February 2017), would only occur about 1-2 times per year, and, in her words, were little  Seizures were described as the patient staring off with drooling and she will know she had a seizure during the night when she would wake up with drool and have a weird feeling  She also has episodes in which she feels like she is going to have a seizure but it does not progress  Prior medications tried include carbamazepine, Topamax (ineffective prior to surgery), and pregabalin  Lamictal was added to her medication regimen on 12/10/2013, and it had been helpful, though it is likely not able to be increased past 200 mg BID because she had experienced a level of 18 and symptoms of toxicity on a dosage of 400 mg BID in 2014  In 2014, oxcarbazepine was added to the Lamictal which seemed to help but did not eliminate her seizures  Patient noted some memory difficulties after her lobe resection and she had neuropsychological testing performed at [facility] by Allie Monique, PHD on 2/22/16  MMSE score was a 29/30 with no deficits noted on simple measures of comprehension, reading, or writing   Wechsler Adult Intelligence Scale-IV score was 83 which placed her within the low to average range; she had notably lower scores on tasks assessing ability to respond orally to a series of word pairs, answer questions about common events, objects, places, and people  Vocabulary, working memory, and nonverbal reasoning fell within the average range  Patient had strengths in perceptual reasoning  Memory testing showed mild weakness in short-term memory evident more on single exposure to complex partial seizures auditory information and on measures of visual recall  Diminished word finding, object naming, slower visual scanning but intact executive functioning  There was moderate likelihood that she had underlying attentional problems  When I saw the patient for the first time on 10/26/17, she appeared to suffer from some degree of depression which was related to her feeling isolated being the only person she knew with epilepsy  I recommended for her to attend the annual [facility] conference to meet other patient with epilepsy  I also recommended her for vagus nerve stimulator implantation since she had failed at least 3 seizure medications, and I gave her information to review  I also started her on Topamax 50 mg BID and once it reached a therapeutic level we would taper her off oxcarbazepine  Last seen on 10/26/17  INTERVAL HISTORY:    AED levels on 11/13/17  Sodium 142  Oxcarbazepine 9  Lamotrigine 8 2  Topiramate 3 0    Patient was instructed on 11/21/17 to increase her topiramate dose from 50 mg BID to 100 mg BID and then was instructed to get a repeat level drawn  AED level on 12/12/17  Oxcarbazepine 9  Lamotrigine 8 4  Topiramate 5 1    Current AEDs:  Lamotrigine 200 mg tablets, 1 tab BID  Oxcarbazepine 300 mg tablets, 1 tab in AM, 1 5 tab in PM  Topamax 100 mg tablets, 1 tab BID    Patient reports she has not had any seizures since her last visit  It has been over a year since her last seizure  She denies any side effects on her Topamax   She has reviewed the vagus nerve stimulator information, but she has determined that she does not want to pursue that procedure at this time  Although she is not experiencing any side effects, she would like to try and decrease one of her medications or possibly discontinue one to help reduce her medication load  The following portions of the patient's history were reviewed and updated as appropriate: allergies, current medications, past family history, past medical history, past social history, past surgical history and problem list       Objective:    Blood pressure 138/84, pulse 94, resp  rate 16, height 5' 8" (1 727 m), weight 79 4 kg (175 lb)  Physical Exam   Constitutional: She appears well-developed and well-nourished  HENT:   Head: Normocephalic and atraumatic  Eyes: EOM are normal  Pupils are equal, round, and reactive to light  Neck: Neck supple  Cardiovascular: Normal rate and regular rhythm  Pulmonary/Chest: Effort normal    Musculoskeletal: Normal range of motion  Neurological: She is alert  She has normal strength and normal reflexes  Gait normal    Psychiatric: Her speech is normal    Vitals reviewed  Neurological Exam    Mental Status  The patient is alert and oriented to person, place, time, and situation  Her recent and remote memory are normal  Her speech is normal  Her language is fluent with no aphasia  She has normal attention span and concentration  She has a normal fund of knowledge  Cranial Nerves    CN II: The patient's visual acuity and visual fields are normal   CN III, IV, VI: The patient's pupils are equally round and reactive to light and ocular movements are normal   CN V: The patient has normal facial sensation  CN VII:  The patient has symmetric facial movement  CN VIII:  The patient's hearing is normal   CN IX, X: The patient has symmetric palate movement and normal gag reflex    CN XI: The patient's shoulder shrug strength is normal   CN XII: The patient's tongue is midline without atrophy or fasciculations  Motor   Her strength is 5/5 throughout all four extremities  Sensory  The patient's sensation is normal in all four extremities  Reflexes  Deep tendon reflexes are 2+ and symmetric in all four extremities with downgoing toes bilaterally  Gait and Coordination  The patient has normal gait and station and normal casual, toe, heel, and tandem gait  ROS:    Review of Systems   Constitutional: Negative  HENT: Negative  Eyes: Negative  Respiratory: Negative  Cardiovascular: Negative  Gastrointestinal: Negative  Endocrine: Negative  Genitourinary: Negative  Musculoskeletal: Negative  Skin: Negative  Allergic/Immunologic: Negative  Neurological: Negative  Hematological: Negative  Psychiatric/Behavioral: Negative  Counseling Documentation:  Time in: 9:30, Time out: 10:00  Total time encounter was 30 minutes and 25 minutes were spent counseling the patient  Attestation:   By signing my name below, Darryle Aden, attgood that this documentation has been prepared under the direction and in the presence of Jocelyn Ha MD  Electronically Signed: Caro Recinos  04/03/18     I, Jocelyn Ha, personally performed the services described in this documentation  All medical record entries made by the scribe were at my direction and in my presence  I have reviewed the chart and discharge instructions and agree that the record reflects my personal performance and is accurate and complete    Jocelyn Ha MD  04/03/18

## 2018-04-09 DIAGNOSIS — G40.219 LOCALIZATION-RELATED SYMPTOMATIC EPILEPSY AND EPILEPTIC SYNDROMES WITH COMPLEX PARTIAL SEIZURES, INTRACTABLE, WITHOUT STATUS EPILEPTICUS (HCC): ICD-10-CM

## 2018-04-09 RX ORDER — LAMOTRIGINE 200 MG/1
200 TABLET ORAL 2 TIMES DAILY
Qty: 180 TABLET | Refills: 3 | Status: SHIPPED | OUTPATIENT
Start: 2018-04-09 | End: 2018-11-05 | Stop reason: SDUPTHER

## 2018-04-25 RX ORDER — DULOXETIN HYDROCHLORIDE 30 MG/1
30 CAPSULE, DELAYED RELEASE ORAL DAILY
Refills: 0 | COMMUNITY
Start: 2018-04-04 | End: 2020-04-24 | Stop reason: ALTCHOICE

## 2018-04-26 ENCOUNTER — OFFICE VISIT (OUTPATIENT)
Dept: NEUROLOGY | Facility: CLINIC | Age: 53
End: 2018-04-26
Payer: COMMERCIAL

## 2018-04-26 VITALS
HEART RATE: 86 BPM | SYSTOLIC BLOOD PRESSURE: 145 MMHG | BODY MASS INDEX: 25.76 KG/M2 | WEIGHT: 170 LBS | HEIGHT: 68 IN | DIASTOLIC BLOOD PRESSURE: 100 MMHG

## 2018-04-26 DIAGNOSIS — G40.019 INTRACTABLE LOCALIZATION-RELATED EPILEPSY (HCC): Primary | ICD-10-CM

## 2018-04-26 PROCEDURE — 99213 OFFICE O/P EST LOW 20 MIN: CPT | Performed by: PSYCHIATRY & NEUROLOGY

## 2018-04-26 RX ORDER — TRIAMCINOLONE ACETONIDE 1 MG/G
CREAM TOPICAL
COMMUNITY
End: 2020-04-24 | Stop reason: ALTCHOICE

## 2018-04-26 RX ORDER — MEDROXYPROGESTERONE ACETATE 150 MG/ML
INJECTION, SUSPENSION INTRAMUSCULAR
COMMUNITY
End: 2019-05-13 | Stop reason: SDUPTHER

## 2018-04-26 NOTE — PROGRESS NOTES
Brittany Reyes is a 47 y o  female here today following up on a history of Seizures  She reports her last seizure was 2 years ago  Chief Complaint   Patient presents with    Seizures       Assessment:  1  Intractable localization-related epilepsy ([facility] [address] )        Plan:    Discussion:  Patient is doing well with her seizures, she is being weaned off the Trileptal by 150 milligrams every 2 weeks, she is tolerating the Topamax well and also is on Lamictal, she was advised to continue with current medications, she has an appointment to follow up with epilepsy specialist, to continue with seizure precautions, avoid seizure triggers, her last seizure has been almost 2 years back as per the patient, she was also advised to have a blood work after she is off the trileptal and follow up with her other physicians, go to the hospital if has any worsening symptoms or recurrence of the seizures and call me otherwise to see me back in 4 months  Subjective:    HPI   Patient is here in follow-up for a history of seizure disorder, since her last visit she is doing good, she denies any new complaints, she is tolerating the Topamax well,, she is being weaned off the Trileptal by the epilepsy specialist, no side effects to the medication, no other complaints      Vitals:    04/26/18 1321   BP: 145/100   BP Location: Left arm   Patient Position: Sitting   Cuff Size: Adult   Pulse: 86   Weight: 77 1 kg (170 lb)   Height: 5' 8" (1 727 m)       Current Medications    Current Outpatient Prescriptions:     DULoxetine (CYMBALTA) 30 mg delayed release capsule, Take 30 mg by mouth daily, Disp: , Rfl: 0    lamoTRIgine (LaMICtal) 200 MG tablet, Take 1 tablet (200 mg total) by mouth 2 (two) times a day, Disp: 180 tablet, Rfl: 3    medroxyPROGESTERone (DEPO-PROVERA) 150 mg/mL injection, medroxyprogesterone 150 mg/mL intramuscular suspension, Disp: , Rfl:     OXcarbazepine (TRILEPTAL) 300 mg tablet, Taper dose by 150 mg every 2 weeks according to handwritten instructions (Patient taking differently: Taper dose by 150 mg every 2 weeks according to handwritten instructions ), Disp: 225 tablet, Rfl: 0    topiramate (TOPAMAX) 100 mg tablet, Take 1 tablet (100 mg total) by mouth 2 (two) times a day, Disp: 180 tablet, Rfl: 3    triamcinolone (KENALOG) 0 1 % cream, APPLY A THIN LAYER TO THE AFFECTED AREA(S) BY TOPICAL ROUTE 2 TIMES PER DAY, Disp: , Rfl:       Allergies  Patient has no known allergies  Past Medical History  Past Medical History:   Diagnosis Date    Seizures [facility])          Past Surgical History:  Past Surgical History:   Procedure Laterality Date    BRAIN SURGERY  2007         Family History:  Family History   Problem Relation Age of Onset    Diabetes Mother     Cancer Father        Social History:   reports that she has never smoked  She has never used smokeless tobacco  She reports that she does not drink alcohol or use drugs  I have reviewed the past medical history, surgical history, social and family history, current medications, allergies vitals, review of systems, and updated this information as appropriate today  Objective:    Physical Exam    Neurological Exam    GENERAL:  Cooperative in no acute distress  Well-developed and well-nourished    HEAD and NECK   Head is atraumatic normocephalic with no lesions or masses  Neck is supple with full range of motion    CARDIOVASCULAR  Carotid Arteries-no carotid bruits  NEUROLOGIC:  Mental Status-the patient is awake alert and oriented without aphasia or apraxia  Cranial Nerves: Visual fields are full to confrontation  Discs are flat  Extraocular movements are full without nystagmus  Pupils are 2-1/2 mm and reactive  Face is symmetrical to light touch  Movements of facial expression move symmetrically  Hearing is normal to finger rub bilaterally  Soft palate lifts symmetrically  Shoulder shrug is symmetrical  Tongue is midline without atrophy    Motor: No drift is noted on arm extension  Strength is full in the upper and lower extremities with normal bulk and tone  Sensory: Intact to temperature and vibratory sensation in the upper and lower extremities bilaterally  Cortical function is intact  Gait is unremarkable           ROS:  Review of Systems   Constitutional: Negative for fatigue and fever  HENT: Negative for congestion, sinus pain, tinnitus and trouble swallowing  Eyes: Negative for photophobia, pain and visual disturbance  Respiratory: Negative for shortness of breath  Cardiovascular: Negative for chest pain  Gastrointestinal: Negative for abdominal pain, constipation and diarrhea  Endocrine: Negative for polydipsia  Genitourinary: Negative for difficulty urinating  Musculoskeletal: Negative for back pain and neck pain  Skin: Negative  Neurological: Negative for dizziness, tremors, seizures, speech difficulty, weakness, numbness and headaches  Psychiatric/Behavioral: Negative for decreased concentration, dysphoric mood and sleep disturbance  The patient is not nervous/anxious

## 2018-11-05 ENCOUNTER — OFFICE VISIT (OUTPATIENT)
Dept: NEUROLOGY | Facility: CLINIC | Age: 53
End: 2018-11-05
Payer: COMMERCIAL

## 2018-11-05 VITALS
HEART RATE: 92 BPM | SYSTOLIC BLOOD PRESSURE: 136 MMHG | BODY MASS INDEX: 27.43 KG/M2 | DIASTOLIC BLOOD PRESSURE: 88 MMHG | WEIGHT: 181 LBS | HEIGHT: 68 IN

## 2018-11-05 DIAGNOSIS — G40.219 LOCALIZATION-RELATED SYMPTOMATIC EPILEPSY AND EPILEPTIC SYNDROMES WITH COMPLEX PARTIAL SEIZURES, INTRACTABLE, WITHOUT STATUS EPILEPTICUS (HCC): ICD-10-CM

## 2018-11-05 PROBLEM — S61.409A OPEN WOUND OF HAND EXCEPT FINGERS: Status: ACTIVE | Noted: 2018-11-05

## 2018-11-05 PROBLEM — I10 HYPERTENSIVE DISORDER: Status: ACTIVE | Noted: 2018-11-05

## 2018-11-05 PROBLEM — R20.9 SKIN SENSATION DISTURBANCE: Status: ACTIVE | Noted: 2018-11-05

## 2018-11-05 PROBLEM — E21.5 DISORDER OF PARATHYROID GLAND (HCC): Status: ACTIVE | Noted: 2018-11-05

## 2018-11-05 PROBLEM — E66.3 OVERWEIGHT: Status: ACTIVE | Noted: 2018-11-05

## 2018-11-05 PROBLEM — G40.401 GRAND MAL STATUS (HCC): Status: ACTIVE | Noted: 2018-11-05

## 2018-11-05 PROBLEM — H53.10 SUBJECTIVE VISUAL DISTURBANCE: Status: ACTIVE | Noted: 2018-11-05

## 2018-11-05 PROBLEM — H61.20 IMPACTED CERUMEN: Status: ACTIVE | Noted: 2018-11-05

## 2018-11-05 PROBLEM — L30.9 ECZEMA: Status: ACTIVE | Noted: 2018-11-05

## 2018-11-05 PROBLEM — Z01.419 ENCOUNTER FOR GYNECOLOGICAL EXAMINATION (GENERAL) (ROUTINE) WITHOUT ABNORMAL FINDINGS: Status: ACTIVE | Noted: 2018-10-23

## 2018-11-05 PROBLEM — Z79.3 LONG TERM CURRENT USE OF HORMONAL CONTRACEPTIVE: Status: ACTIVE | Noted: 2018-10-23

## 2018-11-05 PROBLEM — E83.52 HYPERCALCEMIA: Status: ACTIVE | Noted: 2018-11-05

## 2018-11-05 PROBLEM — F41.1 ANXIETY STATE: Status: ACTIVE | Noted: 2018-11-05

## 2018-11-05 PROBLEM — H60.90 OTITIS EXTERNA: Status: ACTIVE | Noted: 2018-11-05

## 2018-11-05 PROBLEM — R51.9 PAIN IN FACE: Status: ACTIVE | Noted: 2018-11-05

## 2018-11-05 PROBLEM — K52.9 GASTROENTERITIS: Status: ACTIVE | Noted: 2018-11-05

## 2018-11-05 PROBLEM — E86.0 LUETSCHER'S SYNDROME: Status: ACTIVE | Noted: 2018-11-05

## 2018-11-05 PROCEDURE — 99213 OFFICE O/P EST LOW 20 MIN: CPT | Performed by: PSYCHIATRY & NEUROLOGY

## 2018-11-05 RX ORDER — TOPIRAMATE 100 MG/1
100 TABLET, FILM COATED ORAL 2 TIMES DAILY
Qty: 180 TABLET | Refills: 3 | Status: SHIPPED | OUTPATIENT
Start: 2018-11-05 | End: 2019-08-13 | Stop reason: SDUPTHER

## 2018-11-05 RX ORDER — LAMOTRIGINE 200 MG/1
200 TABLET ORAL 2 TIMES DAILY
Qty: 180 TABLET | Refills: 3 | Status: SHIPPED | OUTPATIENT
Start: 2018-11-05 | End: 2018-11-05 | Stop reason: SDUPTHER

## 2018-11-05 RX ORDER — TOPIRAMATE 100 MG/1
100 TABLET, FILM COATED ORAL 2 TIMES DAILY
Qty: 180 TABLET | Refills: 3 | Status: SHIPPED | OUTPATIENT
Start: 2018-11-05 | End: 2018-11-05 | Stop reason: SDUPTHER

## 2018-11-05 RX ORDER — LAMOTRIGINE 200 MG/1
200 TABLET ORAL 2 TIMES DAILY
Qty: 180 TABLET | Refills: 3 | Status: SHIPPED | OUTPATIENT
Start: 2018-11-05 | End: 2019-05-13 | Stop reason: SDUPTHER

## 2018-11-05 NOTE — LETTER
November 5, 2018     Kulwant Myers MD  [address]  [facility]  [address] [address]    Patient: Yousif Llamas   YOB: 1965   Date of Visit: 11/5/2018       Dear Dr Trixie Wilcox: Thank you for referring Yousif Llamas to me for evaluation  Below are my notes for this consultation  If you have questions, please do not hesitate to call me  I look forward to following your patient along with you  Sincerely,        Carl Mcclure MD        CC: No Recipients  Carl Mcclure MD  11/5/2018  4:08 PM  Signed  Patient ID: Yousif Llamas is a 47 y o  female  Assessment/Plan:       Problem List Items Addressed This Visit     None      Visit Diagnoses     Localization-related symptomatic epilepsy and epileptic syndromes with complex partial seizures, intractable, without status epilepticus [facility])                Discussion Summary:    47year old female who had a left temporal lobectomy at [address] in 2007 which eliminated her generalized tonic clonic seizures but left her having nocturnal "little" seizures once or twice a year  She was on 3 AEDs: lamotrigine, Trileptal, and topiramate when I first saw her on 10/26/17, but since then I had increased her topiramate to allow for reducing the number of seizure medications to two rather than three  Since her last visit on 43/18, she followed instructions for tapering off oxcarbazepine and remained seizure free despite medication reduction  She has had no seizures whatsoever including the little ones at night and she feels that her overall side effects from two medications on now is more tolerable than when she was taking three AEDs  We will keep the patient on her current medication doses of topiramate and lamotrigine and I will see her back in a year  She knows if she was to have even a small seizure she needs to call here so adjustments can be made in her medications   There is room to move up on topiramate if necessary since last topiramate level was only 5 1  Subjective:    HPI    6 month follow-up of a 47year old right-handed female who developed localization-related epilepsy in conjunction with pregnancy when she was 33years old in 0  She had a left temporal lobe resection at [address] [place] [address] in 2007  There are no records available prior to 2014, but it is assumed that she had a full workup prior to the temporal lobe resection  The surgery was successful in that she went from having daily seizures to being seizure free for a few years and it eliminated her generalized tonic clonic seizures  Seizures eventually recurred, but fortunately the seizures were nearly exclusively nocturnal (with only one daytime seizure occurring in February 2017), would only occur about 1-2 times per year, and, in her words, were little  Seizures were described as the patient staring off with drooling and she will know she had a seizure during the night when she would wake up with drool and have a weird feeling  She also has episodes in which she feels like she is going to have a seizure but it does not progress  Prior medications tried include carbamazepine, Topamax (ineffective prior to surgery), and pregabalin  Lamictal was added to her medication regimen on 12/10/2013, and it had been helpful, though it is likely not able to be increased past 200 mg BID because she had experienced a level of 18 and symptoms of toxicity on a dosage of 400 mg BID in 2014  In 2014, oxcarbazepine was added to the Lamictal which seemed to help but did not eliminate her seizures  Patient noted some memory difficulties after her lobe resection and she had neuropsychological testing performed at [facility] by Jenny Beltran, PHD on 2/22/16  MMSE score was a 29/30 with no deficits noted on simple measures of comprehension, reading, or writing   Wechsler Adult Intelligence Scale-IV score was 83 which placed her within the low to average range; she had notably lower scores on tasks assessing ability to respond orally to a series of word pairs, answer questions about common events, objects, places, and people  Vocabulary, working memory, and nonverbal reasoning fell within the average range  Patient had strengths in perceptual reasoning  Memory testing showed mild weakness in short-term memory evident more on single exposure to complex partial seizures auditory information and on measures of visual recall  Diminished word finding, object naming, slower visual scanning but intact executive functioning  There was moderate likelihood that she had underlying attentional problems  When I saw the patient for the first time on 10/26/17, she appeared to suffer from some degree of depression which was related to her feeling isolated being the only person she knew with epilepsy  I recommended for her to attend the annual [facility] conference to meet other patient with epilepsy  I also recommended her for vagus nerve stimulator implantation since she had failed at least 3 seizure medications, and I gave her information to review  She was started her on Topamax 50 mg BID and once it reached a therapeutic level we would taper her off oxcarbazepine  After Topamax was increased to 100 mg BID, patient remained seizure free  When I saw her on 4/3/18, she was given instructions for tapering off her oxcarbazepine  Last seen on 4/3/18  INTERVAL HISTORY:      Current AEDs:  Lamotrigine 200 mg tablets, 1 tab BID  Topamax 100 mg tablets, 1 tab BID    Patient has not had any seizures since her last visit  She is no longer on her oxcarbazepine as had been instructed  She is not experiencing any side effects from her Lamictal and Topamax which is improved from her previous medication regimen  She has lost some weight which she is happy with       The following portions of the patient's history were reviewed and updated as appropriate: allergies, current medications, past family history, past medical history, past social history, past surgical history and problem list          Objective:    Blood pressure 136/88, pulse 92, height 5' 8" (1 727 m), weight 82 1 kg (181 lb)  Physical Exam   Constitutional: She is oriented to person, place, and time  She appears well-developed and well-nourished  HENT:   Head: Normocephalic and atraumatic  Right Ear: External ear normal    Left Ear: External ear normal    Eyes: Pupils are equal, round, and reactive to light  Conjunctivae, EOM and lids are normal    Neck: Normal range of motion  Neck supple  Cardiovascular: Normal rate and regular rhythm  Pulmonary/Chest: Effort normal    Musculoskeletal: Normal range of motion  Neurological: She is alert and oriented to person, place, and time  She has normal strength  Coordination normal    Skin: Skin is warm and dry  Psychiatric: Her speech is normal    Vitals reviewed  Neurological Exam  Mental Status  Awake, alert and oriented to person, place and time  Alert  Recent and remote memory are intact  Speech is normal  Language is fluent with no aphasia  Attention and concentration are normal     Cranial Nerves  CN II: Visual acuity is normal  Visual fields full to confrontation  CN III, IV, VI: Extraocular movements intact bilaterally  Extraocular movements intact bilaterally  Normal lids and orbits bilaterally  Pupils equal round and reactive to light bilaterally  CN V: Facial sensation is normal   CN VII: Full and symmetric facial movement  CN VIII: Hearing is normal   CN IX, X: Palate elevates symmetrically  Normal gag reflex  CN XI: Shoulder shrug strength is normal   CN XII: Tongue midline without atrophy or fasciculations  Motor   Strength is 5/5 throughout all four extremities  Coordination  Finger-to-nose, rapid alternating movements and heel-to-shin normal bilaterally without dysmetria      Gait  Normal casual, toe, heel and tandem gait  Romberg is absent  ROS:    Review of Systems   Constitutional: Negative  HENT: Negative  Eyes: Negative  Respiratory: Negative  Cardiovascular: Negative  Gastrointestinal: Negative  Endocrine: Negative  Genitourinary: Negative  Musculoskeletal: Negative  Skin: Negative  Allergic/Immunologic: Negative  Neurological: Negative  Hematological: Negative  Psychiatric/Behavioral: Negative  Counseling Documentation:  Time in: 11:15, Time out: 11:35  Total time encounter was 20 minutes and 15 minutes were spent counseling the patient  Attestation:   By signing my name below, Mallory Summers attest that this documentation has been prepared under the direction and in the presence of Kofi Schmitz MD  Electronically Signed: Caro Toribio  11/05/18     I, Kofi Schmitz, personally performed the services described in this documentation  All medical record entries made by the scribe were at my direction and in my presence  I have reviewed the chart and discharge instructions and agree that the record reflects my personal performance and is accurate and complete    Kofi Schmitz MD  11/05/18

## 2018-11-05 NOTE — PROGRESS NOTES
Patient ID: Maya Manuel is a 47 y o  female  Assessment/Plan:       Problem List Items Addressed This Visit     None      Visit Diagnoses     Localization-related symptomatic epilepsy and epileptic syndromes with complex partial seizures, intractable, without status epilepticus [facility])                Discussion Summary:    47year old female who had a left temporal lobectomy at [place] [address] in 2007 which eliminated her generalized tonic clonic seizures but left her having nocturnal "little" seizures once or twice a year  She was on 3 AEDs: lamotrigine, Trileptal, and topiramate when I first saw her on 10/26/17, but since then I had increased her topiramate to allow for reducing the number of seizure medications to two rather than three  Since her last visit on 43/18, she followed instructions for tapering off oxcarbazepine and remained seizure free despite medication reduction  She has had no seizures whatsoever including the little ones at night and she feels that her overall side effects from two medications on now is more tolerable than when she was taking three AEDs  We will keep the patient on her current medication doses of topiramate and lamotrigine and I will see her back in a year  She knows if she was to have even a small seizure she needs to call here so adjustments can be made in her medications  There is room to move up on topiramate if necessary since last topiramate level was only 5 1  Subjective:    HPI    6 month follow-up of a 47year old right-handed female who developed localization-related epilepsy in conjunction with pregnancy when she was 33years old in 0  She had a left temporal lobe resection at [place] [address] in 2007  There are no records available prior to 2014, but it is assumed that she had a full workup prior to the temporal lobe resection   The surgery was successful in that she went from having daily seizures to being seizure free for a few years and it eliminated her generalized tonic clonic seizures  Seizures eventually recurred, but fortunately the seizures were nearly exclusively nocturnal (with only one daytime seizure occurring in February 2017), would only occur about 1-2 times per year, and, in her words, were little  Seizures were described as the patient staring off with drooling and she will know she had a seizure during the night when she would wake up with drool and have a weird feeling  She also has episodes in which she feels like she is going to have a seizure but it does not progress  Prior medications tried include carbamazepine, Topamax (ineffective prior to surgery), and pregabalin  Lamictal was added to her medication regimen on 12/10/2013, and it had been helpful, though it is likely not able to be increased past 200 mg BID because she had experienced a level of 18 and symptoms of toxicity on a dosage of 400 mg BID in 2014  In 2014, oxcarbazepine was added to the Lamictal which seemed to help but did not eliminate her seizures  Patient noted some memory difficulties after her lobe resection and she had neuropsychological testing performed at [facility] by Christopher Reyes, PHD on 2/22/16  MMSE score was a 29/30 with no deficits noted on simple measures of comprehension, reading, or writing  Wechsler Adult Intelligence Scale-IV score was 83 which placed her within the low to average range; she had notably lower scores on tasks assessing ability to respond orally to a series of word pairs, answer questions about common events, objects, places, and people  Vocabulary, working memory, and nonverbal reasoning fell within the average range  Patient had strengths in perceptual reasoning  Memory testing showed mild weakness in short-term memory evident more on single exposure to complex partial seizures auditory information and on measures of visual recall   Diminished word finding, object naming, slower visual scanning but intact executive functioning  There was moderate likelihood that she had underlying attentional problems  When I saw the patient for the first time on 10/26/17, she appeared to suffer from some degree of depression which was related to her feeling isolated being the only person she knew with epilepsy  I recommended for her to attend the annual [facility] conference to meet other patient with epilepsy  I also recommended her for vagus nerve stimulator implantation since she had failed at least 3 seizure medications, and I gave her information to review  She was started her on Topamax 50 mg BID and once it reached a therapeutic level we would taper her off oxcarbazepine  After Topamax was increased to 100 mg BID, patient remained seizure free  When I saw her on 4/3/18, she was given instructions for tapering off her oxcarbazepine  Last seen on 4/3/18  INTERVAL HISTORY:      Current AEDs:  Lamotrigine 200 mg tablets, 1 tab BID  Topamax 100 mg tablets, 1 tab BID    Patient has not had any seizures since her last visit  She is no longer on her oxcarbazepine as had been instructed  She is not experiencing any side effects from her Lamictal and Topamax which is improved from her previous medication regimen  She has lost some weight which she is happy with  The following portions of the patient's history were reviewed and updated as appropriate: allergies, current medications, past family history, past medical history, past social history, past surgical history and problem list          Objective:    Blood pressure 136/88, pulse 92, height 5' 8" (1 727 m), weight 82 1 kg (181 lb)  Physical Exam   Constitutional: She is oriented to person, place, and time  She appears well-developed and well-nourished  HENT:   Head: Normocephalic and atraumatic     Right Ear: External ear normal    Left Ear: External ear normal    Eyes: Pupils are equal, round, and reactive to light  Conjunctivae, EOM and lids are normal    Neck: Normal range of motion  Neck supple  Cardiovascular: Normal rate and regular rhythm  Pulmonary/Chest: Effort normal    Musculoskeletal: Normal range of motion  Neurological: She is alert and oriented to person, place, and time  She has normal strength  Coordination normal    Skin: Skin is warm and dry  Psychiatric: Her speech is normal    Vitals reviewed  Neurological Exam  Mental Status  Awake, alert and oriented to person, place and time  Alert  Recent and remote memory are intact  Speech is normal  Language is fluent with no aphasia  Attention and concentration are normal     Cranial Nerves  CN II: Visual acuity is normal  Visual fields full to confrontation  CN III, IV, VI: Extraocular movements intact bilaterally  Extraocular movements intact bilaterally  Normal lids and orbits bilaterally  Pupils equal round and reactive to light bilaterally  CN V: Facial sensation is normal   CN VII: Full and symmetric facial movement  CN VIII: Hearing is normal   CN IX, X: Palate elevates symmetrically  Normal gag reflex  CN XI: Shoulder shrug strength is normal   CN XII: Tongue midline without atrophy or fasciculations  Motor   Strength is 5/5 throughout all four extremities  Coordination  Finger-to-nose, rapid alternating movements and heel-to-shin normal bilaterally without dysmetria  Gait  Normal casual, toe, heel and tandem gait  Romberg is absent  ROS:    Review of Systems   Constitutional: Negative  HENT: Negative  Eyes: Negative  Respiratory: Negative  Cardiovascular: Negative  Gastrointestinal: Negative  Endocrine: Negative  Genitourinary: Negative  Musculoskeletal: Negative  Skin: Negative  Allergic/Immunologic: Negative  Neurological: Negative  Hematological: Negative  Psychiatric/Behavioral: Negative          Counseling Documentation:  Time in: 11:15, Time out: 11:35  Total time encounter was 20 minutes and 15 minutes were spent counseling the patient  Attestation:   By signing my name below, Danielito Loyola, attest that this documentation has been prepared under the direction and in the presence of Elle Dockery MD  Electronically Signed: Caro Hawkins  11/05/18     I, Elle Dockery, personally performed the services described in this documentation  All medical record entries made by the scribe were at my direction and in my presence  I have reviewed the chart and discharge instructions and agree that the record reflects my personal performance and is accurate and complete    Elle Dockery MD  11/05/18

## 2019-03-05 ENCOUNTER — TELEPHONE (OUTPATIENT)
Dept: NEUROLOGY | Facility: CLINIC | Age: 54
End: 2019-03-05

## 2019-03-05 NOTE — TELEPHONE ENCOUNTER
Good Morning, Dr Serrano Minus patient above to reschedule appointment  Gave an appointment for 11/13/19 a 1 year follow-up from her visit on 11/05/18  she was okay with it       Thank you

## 2019-05-13 DIAGNOSIS — G40.219 LOCALIZATION-RELATED SYMPTOMATIC EPILEPSY AND EPILEPTIC SYNDROMES WITH COMPLEX PARTIAL SEIZURES, INTRACTABLE, WITHOUT STATUS EPILEPTICUS (HCC): ICD-10-CM

## 2019-05-13 RX ORDER — LAMOTRIGINE 200 MG/1
200 TABLET ORAL 2 TIMES DAILY
Qty: 180 TABLET | Refills: 3 | Status: SHIPPED | OUTPATIENT
Start: 2019-05-13 | End: 2019-05-13 | Stop reason: SDUPTHER

## 2019-05-13 RX ORDER — LAMOTRIGINE 200 MG/1
200 TABLET ORAL 2 TIMES DAILY
Qty: 60 EACH | Refills: 0 | Status: SHIPPED | OUTPATIENT
Start: 2019-05-13 | End: 2019-08-13 | Stop reason: SDUPTHER

## 2019-05-13 RX ORDER — CEFUROXIME AXETIL 500 MG/1
500 TABLET ORAL EVERY 12 HOURS
Refills: 0 | COMMUNITY
Start: 2019-03-28 | End: 2020-04-24 | Stop reason: ALTCHOICE

## 2019-05-13 RX ORDER — QUETIAPINE FUMARATE 50 MG/1
50 TABLET, FILM COATED ORAL
COMMUNITY
Start: 2019-02-19

## 2019-05-13 RX ORDER — BENZONATATE 200 MG/1
200 CAPSULE ORAL 3 TIMES DAILY
Refills: 0 | COMMUNITY
Start: 2019-03-28 | End: 2020-04-24 | Stop reason: ALTCHOICE

## 2019-05-13 RX ORDER — MEDROXYPROGESTERONE ACETATE 150 MG/ML
150 INJECTION, SUSPENSION INTRAMUSCULAR
COMMUNITY
Start: 2019-03-26

## 2019-06-28 ENCOUNTER — HOSPITAL ENCOUNTER (EMERGENCY)
Facility: HOSPITAL | Age: 54
Discharge: HOME | End: 2019-06-28
Attending: EMERGENCY MEDICINE
Payer: COMMERCIAL

## 2019-06-28 VITALS
HEART RATE: 90 BPM | DIASTOLIC BLOOD PRESSURE: 79 MMHG | SYSTOLIC BLOOD PRESSURE: 138 MMHG | BODY MASS INDEX: 27.52 KG/M2 | RESPIRATION RATE: 18 BRPM | OXYGEN SATURATION: 97 % | TEMPERATURE: 99.3 F | WEIGHT: 181 LBS

## 2019-06-28 DIAGNOSIS — L73.2 HIDRADENITIS: ICD-10-CM

## 2019-06-28 DIAGNOSIS — L02.91 ABSCESS: Primary | ICD-10-CM

## 2019-06-28 PROCEDURE — 99284 EMERGENCY DEPT VISIT MOD MDM: CPT | Performed by: EMERGENCY MEDICINE

## 2019-06-28 PROCEDURE — 99282 EMERGENCY DEPT VISIT SF MDM: CPT

## 2019-06-28 RX ORDER — OXYCODONE HYDROCHLORIDE AND ACETAMINOPHEN 5; 325 MG/1; MG/1
1 TABLET ORAL EVERY 4 HOURS PRN
Qty: 10 TABLET | Refills: 0 | Status: SHIPPED | OUTPATIENT
Start: 2019-06-28 | End: 2019-11-14 | Stop reason: ALTCHOICE

## 2019-06-28 RX ORDER — CLINDAMYCIN HYDROCHLORIDE 150 MG/1
300 CAPSULE ORAL EVERY 8 HOURS SCHEDULED
Qty: 42 CAPSULE | Refills: 0 | Status: SHIPPED | OUTPATIENT
Start: 2019-06-28 | End: 2019-07-05

## 2019-06-28 RX ORDER — LIDOCAINE HYDROCHLORIDE AND EPINEPHRINE 20; 5 MG/ML; UG/ML
10 INJECTION, SOLUTION EPIDURAL; INFILTRATION; INTRACAUDAL; PERINEURAL ONCE
Status: COMPLETED | OUTPATIENT
Start: 2019-06-28 | End: 2019-06-28

## 2019-06-28 RX ADMIN — LIDOCAINE HYDROCHLORIDE,EPINEPHRINE BITARTRATE 10 ML: 20; .005 INJECTION, SOLUTION EPIDURAL; INFILTRATION; INTRACAUDAL; PERINEURAL at 12:25

## 2019-06-28 NOTE — ED NOTES
Simple dressing applied to area that was [address] [facility] [address], RN  06/28/19 5476

## 2019-06-28 NOTE — ED PROVIDER NOTES
History  Chief Complaint   Patient presents with    Cyst     Patient stated that she has a cyst under her left arm that started as a pimple about aweek ago     One week of swelling and pain in L armpit  Pain moderate  Worse with palpation  Better with not touching it  No associated fever or chills  No h/o similar sxs  No prior surgical intervention in that area  Currently symptomatic  Additional history provided by   Prior to Admission Medications   Prescriptions Last Dose Informant Patient Reported? Taking? DULoxetine (CYMBALTA) 30 mg delayed release capsule   Yes No   Sig: Take 30 mg by mouth daily   QUEtiapine (SEROquel) 50 mg tablet   Yes No   VENTOLIN  (90 Base) MCG/ACT inhaler   Yes No   Sig: INHALE 2 PUFF(S) 4 TIMES A DAY BY INHALATION ROUTE    benzonatate (TESSALON) 200 MG capsule   Yes No   Sig: Take 200 mg by mouth 3 (three) times a day   cefuroxime (CEFTIN) 500 mg tablet   Yes No   Sig: Take 500 mg by mouth every 12 (twelve) hours   lamoTRIgine (LaMICtal) 200 MG tablet   No No   Sig: Take 1 tablet (200 mg total) by mouth 2 (two) times a day   medroxyPROGESTERone acetate (DEPO-PROVERA SYRINGE) 150 mg/mL injection   Yes No   topiramate (TOPAMAX) 100 mg tablet   No No   Sig: Take 1 tablet (100 mg total) by mouth 2 (two) times a day   triamcinolone (KENALOG) 0 1 % cream   Yes No   Sig: APPLY A THIN LAYER TO THE AFFECTED AREA(S) BY TOPICAL ROUTE 2 TIMES PER DAY      Facility-Administered Medications: None       Past Medical History:   Diagnosis Date    Seizures (HCC)        Past Surgical History:   Procedure Laterality Date    BRAIN SURGERY  2007       Family History   Problem Relation Age of Onset    Diabetes Mother     Cancer Father      I have reviewed and agree with the history as documented      Social History     Tobacco Use    Smoking status: Never Smoker    Smokeless tobacco: Never Used   Substance Use Topics    Alcohol use: No    Drug use: No        Review of Systems Constitutional: Negative for chills and fever  Skin: Positive for wound  All other systems reviewed and are negative  Physical Exam  Physical Exam   Constitutional: She is oriented to person, place, and time  She appears well-developed and well-nourished  No distress  HENT:   Head: Normocephalic and atraumatic  Eyes: Pupils are equal, round, and reactive to light  Conjunctivae and EOM are normal  Right eye exhibits no discharge  Left eye exhibits no discharge  Neck: Normal range of motion  Neck supple  No JVD present  Pulmonary/Chest: Effort normal    Musculoskeletal: Normal range of motion  She exhibits no edema, tenderness or deformity  Neurological: She is alert and oriented to person, place, and time  No cranial nerve deficit or sensory deficit  She exhibits normal muscle tone  Coordination normal    Skin: Skin is warm and dry  Capillary refill takes less than 2 seconds  She is not diaphoretic  L axillary swelling and tenderness with multiple individual areas of swelling  There is no crepitus  There is no surrounding cellulitis  Nursing note and vitals reviewed        Vital Signs  ED Triage Vitals [06/28/19 1047]   Temperature Pulse Respirations Blood Pressure SpO2   99 3 °F (37 4 °C) 90 18 138/79 97 %      Temp Source Heart Rate Source Patient Position - Orthostatic VS BP Location FiO2 (%)   Oral Monitor Sitting Right arm --      Pain Score       8           Vitals:    06/28/19 1047   BP: 138/79   Pulse: 90   Patient Position - Orthostatic VS: Sitting         Visual Acuity      ED Medications  Medications   lidocaine-epinephrine (XYLOCAINE-MPF/EPINEPHRINE) 2 %-1:200,000 injection 10 mL (10 mL Infiltration Given 6/28/19 1225)       Diagnostic Studies  Results Reviewed     None                 No orders to display              Procedures  Incision and drain  Date/Time: 6/28/2019 12:15 PM  Performed by: Tyra Sutton MD  Authorized by: Tyra Sutton MD     Patient location:  ED  Consent: Consent obtained:  Verbal    Consent given by:  Patient    Risks discussed:  Bleeding, damage to other organs, infection, incomplete drainage and pain    Alternatives discussed:  No treatment, delayed treatment, alternative treatment, observation and referral  Universal protocol:     Patient identity confirmed:  Verbally with patient  Location:     Type:  Abscess    Size:  5cm    Location:  Trunk    Trunk location:  Chest  Procedure details:     Incision types:  Stab incision and single straight    Scalpel blade:  11    Approach:  Open    Drainage:  Bloody and purulent    Drainage amount: Moderate    Packing materials:  1/4 in gauze  Post-procedure details:     Patient tolerance of procedure: Tolerated well, no immediate complications  Comments:      L axillary abscess  C/w hidradenitis  Copious purulent drainage  Based on size and depth of wound I discussed that she will need surgical drainage evaluation and likely additional surgical intervention, she and  express understanding of this  Will remove packing in 2days  F/u gen surg or rted if new or worsening sxs  ED Course                               MDM  Number of Diagnoses or Management Options  Abscess:   Hidradenitis:   Diagnosis management comments: L axillary hidradenitis  Bedside incision and drainage performed by me  I discussed with pt that the size and complexity of this infection is likely not going to be amenable to a single bedside I/d and will require further evaluation and treatment by surgery  Plan at this time - d/c home, f/u gen surgery, start abx, rted if new or worsening sxs or systemic illness         Disposition  Final diagnoses:   Abscess   Hidradenitis     Time reflects when diagnosis was documented in both MDM as applicable and the Disposition within this note     Time User Action Codes Description Comment    6/28/2019 12:24 PM Austin Salcido Add [L02 91] Abscess     6/28/2019 12:24 PM Dmitry Saldaña Add [L73 2] Hidradenitis       ED Disposition     ED Disposition Condition Date/Time Comment    Discharge Stable Fri Jun 28, 2019 12:24 PM Emilio Frazier discharge to home/self care              Follow-up Information     Follow up With Specialties Details Why Contact Info    Joseph Mcintyre MD General Surgery In 3 days For wound re-check [address]  [phone]  [phone]            Discharge Medication List as of 6/28/2019 12:25 PM      START taking these medications    Details   clindamycin (CLEOCIN) 150 mg capsule Take 2 capsules (300 mg total) by mouth every 8 (eight) hours for 7 days, Starting Fri 6/28/2019, Until Fri 7/5/2019, Print      oxyCODONE-acetaminophen (PERCOCET) 5-325 mg per tablet Take 1 tablet by mouth every 4 (four) hours as needed for moderate pain for up to 10 dosesMax Daily Amount: 6 tablets, Starting Fri 6/28/2019, Print         CONTINUE these medications which have NOT CHANGED    Details   benzonatate (TESSALON) 200 MG capsule Take 200 mg by mouth 3 (three) times a day, Starting Thu 3/28/2019, Historical Med      cefuroxime (CEFTIN) 500 mg tablet Take 500 mg by mouth every 12 (twelve) hours, Starting Thu 3/28/2019, Historical Med      DULoxetine (CYMBALTA) 30 mg delayed release capsule Take 30 mg by mouth daily, Starting Wed 4/4/2018, Historical Med      lamoTRIgine (LaMICtal) 200 MG tablet Take 1 tablet (200 mg total) by mouth 2 (two) times a day, Starting Mon 5/13/2019, Normal      medroxyPROGESTERone acetate (DEPO-PROVERA SYRINGE) 150 mg/mL injection Starting Tue 3/26/2019, Historical Med      QUEtiapine (SEROquel) 50 mg tablet Starting Tue 2/19/2019, Historical Med      topiramate (TOPAMAX) 100 mg tablet Take 1 tablet (100 mg total) by mouth 2 (two) times a day, Starting Mon 11/5/2018, Normal      triamcinolone (KENALOG) 0 1 % cream APPLY A THIN LAYER TO THE AFFECTED AREA(S) BY TOPICAL ROUTE 2 TIMES PER DAY, Historical Med      VENTOLIN  (90 Base) MCG/ACT inhaler INHALE 2 PUFF(S) 4 TIMES A DAY BY INHALATION ROUTE , Historical Med           No discharge procedures on file      ED Provider  Electronically Signed by           Phoenix Andino MD  06/28/19 2026

## 2019-07-08 ENCOUNTER — CONSULT (OUTPATIENT)
Dept: SURGERY | Facility: CLINIC | Age: 54
End: 2019-07-08
Payer: COMMERCIAL

## 2019-07-08 VITALS
HEART RATE: 89 BPM | TEMPERATURE: 97.7 F | DIASTOLIC BLOOD PRESSURE: 86 MMHG | BODY MASS INDEX: 23.43 KG/M2 | SYSTOLIC BLOOD PRESSURE: 140 MMHG | HEIGHT: 68 IN | WEIGHT: 154.6 LBS

## 2019-07-08 DIAGNOSIS — L73.2 HIDRADENITIS SUPPURATIVA OF LEFT AXILLA: Primary | ICD-10-CM

## 2019-07-08 PROCEDURE — 99203 OFFICE O/P NEW LOW 30 MIN: CPT | Performed by: SURGERY

## 2019-07-08 NOTE — PROGRESS NOTES
Office Visit - General Surgery  Carvel Sandifer MRN: [id number]  Encounter: [id number]    Assessment and Plan    Problem List Items Addressed This Visit        Musculoskeletal and Integument    Hidradenitis suppurativa of left axilla - Primary     53yo F w/ mild hidradenitis suppurativa of left axilla    --no need for surgical intervention or antibiotic therapy at this time  --return to clinic as needed for new or worsening symptoms               Chief Complaint:  Carvel Sandifer is a 49 y o  female who presents for Abscess (Left axilla)    Subjective  Ms Court Malcolm is a 48 yo F who presents to clinic today for hidradenitis suppurativa of the L axilla  She was seen in the ED on 6/28/2019 for evaluation of L axillary abscess  A bedside I&D was performed in the ED and the patient was discharged on clindamycin  Today, she has no complaints  She denies any pain, redness, or drainage from the wound  She states it has not worsened since being seen in the ED  Denies fevers/chills   States she took clinda for only 3 days following discharge as she "didn't need it anymore "    Past Medical History  Past Medical History:   Diagnosis Date    Seizures ([address] )        Past Surgical History  Past Surgical History:   Procedure Laterality Date    BRAIN SURGERY  2007       Family History  Family History   Problem Relation Age of Onset    Diabetes Mother     Cancer Father        Social History  Social History     Socioeconomic History    Marital status: /Civil Union     Spouse name: None    Number of children: None    Years of education: None    Highest education level: None   Occupational History    None   Social Needs    Financial resource strain: None    Food insecurity:     Worry: None     Inability: None    Transportation needs:     Medical: None     Non-medical: None   Tobacco Use    Smoking status: Never Smoker    Smokeless tobacco: Never Used   Substance and Sexual Activity    Alcohol use: No    Drug use: No    Sexual activity: None   Lifestyle    Physical activity:     Days per week: None     Minutes per session: None    Stress: None   Relationships    Social connections:     Talks on phone: None     Gets together: None     Attends [employer] service: None     Active member of club or organization: None     Attends meetings of clubs or organizations: None     Relationship status: None    Intimate partner violence:     Fear of current or ex partner: None     Emotionally abused: None     Physically abused: None     Forced sexual activity: None   Other Topics Concern    None   Social History Narrative    None        Medications  Current Outpatient Medications on File Prior to Visit   Medication Sig Dispense Refill    DULoxetine (CYMBALTA) 30 mg delayed release capsule Take 30 mg by mouth daily  0    lamoTRIgine (LaMICtal) 200 MG tablet Take 1 tablet (200 mg total) by mouth 2 (two) times a day 60 each 0    medroxyPROGESTERone acetate (DEPO-PROVERA SYRINGE) 150 mg/mL injection       topiramate (TOPAMAX) 100 mg tablet Take 1 tablet (100 mg total) by mouth 2 (two) times a day 180 tablet 3    benzonatate (TESSALON) 200 MG capsule Take 200 mg by mouth 3 (three) times a day  0    cefuroxime (CEFTIN) 500 mg tablet Take 500 mg by mouth every 12 (twelve) hours  0    oxyCODONE-acetaminophen (PERCOCET) 5-325 mg per tablet Take 1 tablet by mouth every 4 (four) hours as needed for moderate pain for up to 10 dosesMax Daily Amount: 6 tablets (Patient not taking: Reported on 7/8/2019) 10 tablet 0    QUEtiapine (SEROquel) 50 mg tablet       triamcinolone (KENALOG) 0 1 % cream APPLY A THIN LAYER TO THE AFFECTED AREA(S) BY TOPICAL ROUTE 2 TIMES PER DAY      VENTOLIN  (90 Base) MCG/ACT inhaler INHALE 2 PUFF(S) 4 TIMES A DAY BY INHALATION ROUTE   1     No current facility-administered medications on file prior to visit  Allergies  No Known Allergies    Review of Systems   Constitutional: Negative  Respiratory: Negative  Cardiovascular: Negative  Gastrointestinal: Negative  Musculoskeletal: Negative  Skin: Positive for wound  Negative for color change, pallor and rash  Objective  Vitals:    07/08/19 1032   BP: 140/86   Pulse: 89   Temp: 97 7 °F (36 5 °C)       Physical Exam   Constitutional: She is oriented to person, place, and time  She appears well-developed and well-nourished  No distress  HENT:   Head: Normocephalic and atraumatic  Eyes: Pupils are equal, round, and reactive to light  Cardiovascular: Normal rate and regular rhythm  Pulmonary/Chest: Effort normal and breath sounds normal    Abdominal: Soft  Musculoskeletal: Normal range of motion  Neurological: She is alert and oriented to person, place, and time  Skin: Skin is warm and dry  No rash noted  No erythema  No pallor  L axilla <1cm wound c/w hidradenitis s/p I&D, no drainage, no purulence, no redness, no overlying cellulitis   Very small amount of what appears to be subcutaneous fat extruded through wound but not bothering patient

## 2019-07-08 NOTE — ASSESSMENT & PLAN NOTE
51yo F w/ mild hidradenitis suppurativa of left axilla    --no need for surgical intervention or antibiotic therapy at this time  --return to clinic as needed for new or worsening symptoms

## 2019-08-13 DIAGNOSIS — G40.219 LOCALIZATION-RELATED SYMPTOMATIC EPILEPSY AND EPILEPTIC SYNDROMES WITH COMPLEX PARTIAL SEIZURES, INTRACTABLE, WITHOUT STATUS EPILEPTICUS (HCC): ICD-10-CM

## 2019-08-13 RX ORDER — TOPIRAMATE 100 MG/1
100 TABLET, FILM COATED ORAL 2 TIMES DAILY
Qty: 8 TABLET | Refills: 0 | Status: SHIPPED | OUTPATIENT
Start: 2019-08-13 | End: 2019-11-13 | Stop reason: SDUPTHER

## 2019-08-13 RX ORDER — LAMOTRIGINE 200 MG/1
200 TABLET ORAL 2 TIMES DAILY
Qty: 8 TABLET | Refills: 0 | Status: SHIPPED | OUTPATIENT
Start: 2019-08-13 | End: 2019-11-13 | Stop reason: SDUPTHER

## 2019-08-13 NOTE — TELEPHONE ENCOUNTER
Pt called and states that she is currently at [facility] until Friday  She forgot her seizure meds  Pt states that she is currently taking Lamictal 200 mg 1 tab bid and Topamax 100 mg 1 tab bid     Pt states that she will find a pharmacy in the area and will us back

## 2019-08-13 NOTE — TELEPHONE ENCOUNTER
pt called back and states that [facility], [address]    she is looking for a 4 day supply be sent to pharm  Scripts entered for Wilian Cody   She has been out of meds since yesterday    Last dose was yesterday am

## 2019-10-04 ENCOUNTER — TELEPHONE (OUTPATIENT)
Dept: NEUROLOGY | Facility: CLINIC | Age: 54
End: 2019-10-04

## 2019-10-04 NOTE — TELEPHONE ENCOUNTER
Spoke with pt regarding 2 appointments she had scheduled with Dr Calixto Rueda 11/11/19  [address] [place] [address] location and 11/13/19 [facility]  Pt choose 11/13/19 appt as she lives in [place]  Pt understood and confirmed appt

## 2019-11-13 ENCOUNTER — OFFICE VISIT (OUTPATIENT)
Dept: NEUROLOGY | Facility: CLINIC | Age: 54
End: 2019-11-13
Payer: COMMERCIAL

## 2019-11-13 VITALS
WEIGHT: 156 LBS | BODY MASS INDEX: 23.64 KG/M2 | SYSTOLIC BLOOD PRESSURE: 122 MMHG | HEART RATE: 90 BPM | DIASTOLIC BLOOD PRESSURE: 80 MMHG | HEIGHT: 68 IN

## 2019-11-13 DIAGNOSIS — G40.019 INTRACTABLE LOCALIZATION-RELATED EPILEPSY (HCC): Primary | ICD-10-CM

## 2019-11-13 DIAGNOSIS — F41.1 ANXIETY STATE: ICD-10-CM

## 2019-11-13 DIAGNOSIS — G40.219 LOCALIZATION-RELATED SYMPTOMATIC EPILEPSY AND EPILEPTIC SYNDROMES WITH COMPLEX PARTIAL SEIZURES, INTRACTABLE, WITHOUT STATUS EPILEPTICUS (HCC): ICD-10-CM

## 2019-11-13 DIAGNOSIS — F32.9 REACTIVE DEPRESSION: ICD-10-CM

## 2019-11-13 PROCEDURE — 99214 OFFICE O/P EST MOD 30 MIN: CPT | Performed by: PSYCHIATRY & NEUROLOGY

## 2019-11-13 RX ORDER — LAMOTRIGINE 200 MG/1
200 TABLET ORAL 2 TIMES DAILY
Qty: 180 TABLET | Refills: 3 | Status: SHIPPED | OUTPATIENT
Start: 2019-11-13 | End: 2020-01-27 | Stop reason: SDUPTHER

## 2019-11-13 RX ORDER — TOPIRAMATE 100 MG/1
100 TABLET, FILM COATED ORAL 2 TIMES DAILY
Qty: 180 TABLET | Refills: 3 | Status: SHIPPED | OUTPATIENT
Start: 2019-11-13 | End: 2020-01-03 | Stop reason: SDUPTHER

## 2019-11-13 NOTE — PROGRESS NOTES
Patient ID: Kiara Springer is a 49 y o  female  Assessment/Plan:    Concetta Colunga was seen today for seizures  Diagnoses and all orders for this visit:    Intractable localization-related epilepsy ([facility] [address] )    Localization-related symptomatic epilepsy and epileptic syndromes with complex partial seizures, intractable, without status epilepticus ([facility] [address] )  -     topiramate (TOPAMAX) 100 mg tablet; Take 1 tablet (100 mg total) by mouth 2 (two) times a day  -     lamoTRIgine (LaMICtal) 200 MG tablet; Take 1 tablet (200 mg total) by mouth 2 (two) times a day    Anxiety state    Reactive depression        Discussion Summary:  49year old female who had had a left temporal lobectomy in  that eliminated her daily generalized tonic clonic seizures  When I first saw her in  she was still having 1-2 nocturnal seizures per year, probably focal, that she knew she would have because she would wake up staring and drooling  I increased her topiramate which allowed for tapering off oxcarbazepine, and on her current lamotrigine and topiramate combination she has been seizure free since 2018  Patient mentioned that she was having "memory problems " Specifically word finding problems seemed to be the most prominent component of it and I told the patient that topiramate has been known to cause such cognitive problems  I would prefer to keep her on her current dose of her seizure medications especially since last time we had a topiramate level drawn it was only 5 1  If next time I see her, her memory problems are still bothersome and cannot be attributed to ongoing psychological stressors, I may consider reducing the topiramate dose a bit  Patient mentions today that she was also suffering from anxiety and depression, managed by her PCP  She's taking Cymbalta 30 mg and Seroquel 50 mg but still felt quite depressed and often anxious   Her 25year old son  last year of a heroin overdose and she's also dealing with several other family related problems  The patient mentions this in regards to asking whether CBD might be helpful for her  I told her that CBD certainly was not contraindicated by her seizures since it actually is used for certain refractory epilepsy syndromes  From what I've read CBD can be helpful for anxiety, and I just cautioned her to make sure that wherever she got it from was a reliable source  I'll see her back in 6 months for an hour  Subjective:    HPI    Annual follow-up of a 49year old right-handed female who developed localization-related epilepsy in conjunction with pregnancy when she was 33years old in 0  She had a left temporal lobe resection at [address] [place] [address] in 2007  There are no records available prior to 2014, but it is assumed that she had a full workup prior to the temporal lobe resection  The surgery was successful in that she went from having daily seizures to being seizure free for a few years and it eliminated her generalized tonic clonic seizures  Seizures eventually recurred, but fortunately the seizures were nearly exclusively nocturnal (with only one daytime seizure occurring in February 2017), would only occur about 1-2 times per year, and, in her words, were little  Seizures were described as the patient staring off with drooling and she will know she had a seizure during the night when she would wake up with drool and have a weird feeling  She also has episodes in which she feels like she is going to have a seizure but it does not progress  Prior medications tried include carbamazepine, Topamax (ineffective prior to surgery), and pregabalin  Lamictal was added to her medication regimen on 12/10/2013, and it had been helpful, though it is likely not able to be increased past 200 mg BID because she had experienced a level of 18 and symptoms of toxicity on a dosage of 400 mg BID in 2014   In 2014, oxcarbazepine was added to the Lamictal which seemed to help but did not eliminate her seizures  Patient noted some memory difficulties after her lobe resection and she had neuropsychological testing performed at [facility] by Adalgisa Kaminski, PHD on 2/22/16  MMSE score was a 29/30 with no deficits noted on simple measures of comprehension, reading, or writing  Wechsler Adult Intelligence Scale-IV score was 83 which placed her within the low to average range; she had notably lower scores on tasks assessing ability to respond orally to a series of word pairs, answer questions about common events, objects, places, and people  Vocabulary, working memory, and nonverbal reasoning fell within the average range  Patient had strengths in perceptual reasoning  Memory testing showed mild weakness in short-term memory evident more on single exposure to complex partial seizures auditory information and on measures of visual recall  Diminished word finding, object naming, slower visual scanning but intact executive functioning  There was moderate likelihood that she had underlying attentional problems  When I saw the patient for the first time on 10/26/17, she appeared to suffer from some degree of depression which was related to her feeling isolated being the only person she knew with epilepsy  I recommended for her to attend the annual [facility] conference to meet other patients with epilepsy  I also recommended her for vagus nerve stimulator implantation since she had failed at least 3 seizure medications, and I gave her information to review  She was started her on Topamax 50 mg BID and once it reached a therapeutic level we would taper her off oxcarbazepine  After Topamax was increased to 100 mg BID, patient remained seizure free  When I saw her on 4/3/18, she was given instructions for tapering off her oxcarbazepine       When I saw the patient on 11/5/18, she remained seizure free and had successfully tapered off her oxcarbazepine  She had not even had any little nocturnal seizures and felt her overall AED side effects were much more tolerable  Last topiramate level was only 5 1 so it could be increased if necessary  Last lamotrigine level was 8 4  Last seen on 11/5/18  INTERVAL HISTORY:    Patient was seen at the [facility] on 6/28/19 for an abscess under her left armpit requiring incision and drainage  She was started on antibiotics thereafter  In August the patient forgot her seizure medications when she went to [facility] so a script was sent to a local pharmacy there  Patient has not had any seizures since her last visit  Current AEDs:  Lamotrigine 200 mg tablets, 1 tablet twice a day  Topamax 100 mg tablets, 1 tablet twice a day  Depo-Provera 150 mcg/mL every 3 months   Cymbalta 30 mg daily  Seroquel 50 mg daily    She has not had any of her nocturnal seizures either  She has noticed some memory problems and will forget some things people tell her but she thinks this may be selective  She has had some word finding problems and trouble making decisions  She thinks this is tolerable  She can function okay and is not getting lost  Sometimes she feels a bit out of it on occasion  Patient had some questions about CBD gummies and what these would work for  She does have some anxiety/depression and would be interested in taking these for that although she would prefer not to have those with THC  She is on Cymbalta 30 mg it does help her a little bit but does make her jittery  Her family doctor monitors this  She also takes Seroquel 50 mg  She has never been on Zoloft  She does not want to feel numb from being on too many antidepressants  The patient lost her son last year and he passed at 25 years from a heroin overdose  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 as his girlfriend was pregnant at the time  She also has stress from her  and daughter at home  She does see a counselor  The following portions of the patient's history were reviewed and updated as appropriate: allergies, current medications, past family history, past medical history, past social history, past surgical history and problem list          Objective:    Blood pressure 122/80, pulse 90, height 5' 8" (1 727 m), weight 70 8 kg (156 lb)  Physical Exam   Constitutional: She is oriented to person, place, and time  She appears well-developed and well-nourished  HENT:   Head: Normocephalic and atraumatic  Right Ear: External ear normal    Left Ear: External ear normal    Eyes: Pupils are equal, round, and reactive to light  EOM and lids are normal    Neck: Neck supple  Cardiovascular: Normal rate and regular rhythm  Pulmonary/Chest: Effort normal    Musculoskeletal: Normal range of motion  Neurological: She is alert and oriented to person, place, and time  She has normal strength and normal reflexes  Coordination normal    Skin: Skin is warm and dry  Psychiatric: She has a normal mood and affect  Her speech is normal    Vitals reviewed  Neurological Exam  Mental Status  Awake and alert  Oriented to person, place and time  Speech is normal  Language is fluent with no aphasia  Attention and concentration are normal     Cranial Nerves  CN II: Visual acuity is normal  Visual fields full to confrontation  CN III, IV, VI: Extraocular movements intact bilaterally  Extraocular movements intact bilaterally  Normal lids and orbits bilaterally  Pupils equal round and reactive to light bilaterally  CN V: Facial sensation is normal   CN VII: Full and symmetric facial movement  CN VIII: Hearing is normal   CN IX, X: Palate elevates symmetrically  Normal gag reflex  CN XI: Shoulder shrug strength is normal   CN XII: Tongue midline without atrophy or fasciculations  Motor   Strength is 5/5 throughout all four extremities      Sensory  Sensation is intact to light touch, pinprick, vibration and proprioception in all four extremities  Reflexes  Deep tendon reflexes are 2+ and symmetric in all four extremities with downgoing toes bilaterally  Coordination  Finger-to-nose, rapid alternating movements and heel-to-shin normal bilaterally without dysmetria  Gait  Normal casual, toe, heel and tandem gait  Romberg is absent  ROS:    Review of Systems   Constitutional: Negative  Negative for appetite change and fever  HENT: Negative  Negative for hearing loss, tinnitus, trouble swallowing and voice change  Eyes: Negative  Negative for photophobia and pain  Respiratory: Negative  Negative for shortness of breath  Cardiovascular: Negative  Negative for palpitations  Gastrointestinal: Negative  Negative for nausea and vomiting  Endocrine: Negative  Negative for cold intolerance and heat intolerance  Genitourinary: Negative  Negative for dysuria, frequency and urgency  Musculoskeletal: Negative  Negative for myalgias and neck pain  Skin: Negative  Negative for rash  Neurological: Negative  Negative for dizziness, tremors, seizures, syncope, facial asymmetry, speech difficulty, weakness, light-headedness, numbness and headaches  Hematological: Negative  Does not bruise/bleed easily  Psychiatric/Behavioral: Negative  Negative for confusion, hallucinations and sleep disturbance  Counseling Attestation:  Time in: 11:25, Time out: 11:50  Total time encounter was 25 minutes and 20 minutes were spent counseling the patient      Attestation:   By signing my name below, Christian Moisés, attest that this documentation has been prepared under the direction and in the presence of Jesus Wong MD  Electronically Signed: Caro Montoya  11/13/19     I, Jesus Wong, personally performed the services described in this documentation  All medical record entries made by the scribe were at my direction and in my presence   I have reviewed the chart and discharge instructions and agree that the record reflects my personal performance and is accurate and complete  Allyn Melchor MD  11/13/19

## 2019-11-13 NOTE — PATIENT INSTRUCTIONS
1  Continue to take your current medications:  Lamotrigine 200 mg tablets, 1 tablet twice a day  Topamax 100 mg tablets, 1 tablet twice a day  2  Feel free to pursue using CBD for anxiety management  3  Follow up with me in 6 months and call with any questions, concerns, and any new or suspected seizures

## 2019-11-14 PROBLEM — F41.8 OTHER SPECIFIED ANXIETY DISORDERS: Status: ACTIVE | Noted: 2019-11-14

## 2019-11-14 PROBLEM — F32.9 REACTIVE DEPRESSION: Status: ACTIVE | Noted: 2019-11-14

## 2020-01-03 DIAGNOSIS — G40.219 LOCALIZATION-RELATED SYMPTOMATIC EPILEPSY AND EPILEPTIC SYNDROMES WITH COMPLEX PARTIAL SEIZURES, INTRACTABLE, WITHOUT STATUS EPILEPTICUS (HCC): ICD-10-CM

## 2020-01-03 RX ORDER — TOPIRAMATE 100 MG/1
100 TABLET, FILM COATED ORAL 2 TIMES DAILY
Qty: 180 TABLET | Refills: 3 | Status: SHIPPED | OUTPATIENT
Start: 2020-01-03 | End: 2020-04-24 | Stop reason: SDUPTHER

## 2020-01-03 NOTE — TELEPHONE ENCOUNTER
Medication refill check list    Correct patient? yes   Correct medication name, dose, and pill size? yes   Correct provider? yes   Last and Next appt  scheduled? Yes, last date 11/13/19 & next date 5/20/2020   Right pharmacy listed? yes   Correct quantity for 30 or 90 days? yes   Is the patient out of refills? When was it last prescribed? Yes, last date 11/13/19   Directions match what the patient says they are taking?  yes   Enough refills? (none for controlled substances, 1 year for routine medications) yes

## 2020-01-27 DIAGNOSIS — G40.219 LOCALIZATION-RELATED SYMPTOMATIC EPILEPSY AND EPILEPTIC SYNDROMES WITH COMPLEX PARTIAL SEIZURES, INTRACTABLE, WITHOUT STATUS EPILEPTICUS (HCC): ICD-10-CM

## 2020-01-27 RX ORDER — LAMOTRIGINE 200 MG/1
200 TABLET ORAL 2 TIMES DAILY
Qty: 180 TABLET | Refills: 3 | Status: SHIPPED | OUTPATIENT
Start: 2020-01-27 | End: 2020-04-24 | Stop reason: SDUPTHER

## 2020-01-27 NOTE — TELEPHONE ENCOUNTER
[facility], she can no longer fill it at her local pharmacy  Asking for it to go ASAP because she will run out of med Sat

## 2020-04-24 ENCOUNTER — TELEMEDICINE (OUTPATIENT)
Dept: NEUROLOGY | Facility: CLINIC | Age: 55
End: 2020-04-24
Payer: COMMERCIAL

## 2020-04-24 VITALS — WEIGHT: 155 LBS | BODY MASS INDEX: 23.49 KG/M2 | HEIGHT: 68 IN

## 2020-04-24 DIAGNOSIS — G40.219 LOCALIZATION-RELATED SYMPTOMATIC EPILEPSY AND EPILEPTIC SYNDROMES WITH COMPLEX PARTIAL SEIZURES, INTRACTABLE, WITHOUT STATUS EPILEPTICUS (HCC): ICD-10-CM

## 2020-04-24 PROCEDURE — 99214 OFFICE O/P EST MOD 30 MIN: CPT | Performed by: NURSE PRACTITIONER

## 2020-04-24 RX ORDER — TOPIRAMATE 100 MG/1
100 TABLET, FILM COATED ORAL 2 TIMES DAILY
Qty: 180 TABLET | Refills: 3 | Status: SHIPPED | OUTPATIENT
Start: 2020-04-24 | End: 2020-10-21

## 2020-04-24 RX ORDER — LAMOTRIGINE 200 MG/1
200 TABLET ORAL 2 TIMES DAILY
Qty: 180 TABLET | Refills: 3 | Status: SHIPPED | OUTPATIENT
Start: 2020-04-24 | End: 2020-10-21

## 2020-10-15 ENCOUNTER — TELEPHONE (OUTPATIENT)
Dept: NEUROLOGY | Facility: CLINIC | Age: 55
End: 2020-10-15

## 2020-10-21 ENCOUNTER — APPOINTMENT (EMERGENCY)
Dept: CT IMAGING | Facility: HOSPITAL | Age: 55
End: 2020-10-21
Payer: COMMERCIAL

## 2020-10-21 ENCOUNTER — HOSPITAL ENCOUNTER (EMERGENCY)
Facility: HOSPITAL | Age: 55
Discharge: HOME | End: 2020-10-21
Attending: EMERGENCY MEDICINE | Admitting: EMERGENCY MEDICINE
Payer: COMMERCIAL

## 2020-10-21 VITALS
BODY MASS INDEX: 23.9 KG/M2 | DIASTOLIC BLOOD PRESSURE: 98 MMHG | HEART RATE: 96 BPM | SYSTOLIC BLOOD PRESSURE: 166 MMHG | WEIGHT: 157.19 LBS | RESPIRATION RATE: 17 BRPM | TEMPERATURE: 98.6 F | OXYGEN SATURATION: 97 %

## 2020-10-21 DIAGNOSIS — N20.0 NEPHROLITHIASIS: Primary | ICD-10-CM

## 2020-10-21 LAB
ALBUMIN SERPL BCP-MCNC: 4.7 G/DL (ref 3.5–5)
ALP SERPL-CCNC: 137 U/L (ref 46–116)
ALT SERPL W P-5'-P-CCNC: 29 U/L (ref 12–78)
ANION GAP SERPL CALCULATED.3IONS-SCNC: 11 MMOL/L (ref 4–13)
AST SERPL W P-5'-P-CCNC: 18 U/L (ref 5–45)
BACTERIA UR QL AUTO: ABNORMAL /HPF
BASOPHILS # BLD MANUAL: 0.28 THOUSAND/UL (ref 0–0.1)
BASOPHILS NFR MAR MANUAL: 2 % (ref 0–1)
BILIRUB SERPL-MCNC: 0.4 MG/DL (ref 0.2–1)
BILIRUB UR QL STRIP: NEGATIVE
BUN SERPL-MCNC: 16 MG/DL (ref 5–25)
CALCIUM SERPL-MCNC: 10.3 MG/DL (ref 8.3–10.1)
CHLORIDE SERPL-SCNC: 107 MMOL/L (ref 100–108)
CLARITY UR: ABNORMAL
CO2 SERPL-SCNC: 24 MMOL/L (ref 21–32)
COLOR UR: YELLOW
CREAT SERPL-MCNC: 1.21 MG/DL (ref 0.6–1.3)
EOSINOPHIL # BLD MANUAL: 0 THOUSAND/UL (ref 0–0.4)
EOSINOPHIL NFR BLD MANUAL: 0 % (ref 0–6)
ERYTHROCYTE [DISTWIDTH] IN BLOOD BY AUTOMATED COUNT: 12.9 % (ref 11.6–15.1)
EXT PREG TEST URINE: NEGATIVE
EXT. CONTROL ED NAV: NORMAL
GFR SERPL CREATININE-BSD FRML MDRD: 51 ML/MIN/1.73SQ M
GLUCOSE SERPL-MCNC: 139 MG/DL (ref 65–140)
GLUCOSE UR STRIP-MCNC: NEGATIVE MG/DL
HCT VFR BLD AUTO: 43.4 % (ref 34.8–46.1)
HGB BLD-MCNC: 14.3 G/DL (ref 11.5–15.4)
HGB UR QL STRIP.AUTO: ABNORMAL
KETONES UR STRIP-MCNC: NEGATIVE MG/DL
LEUKOCYTE ESTERASE UR QL STRIP: ABNORMAL
LIPASE SERPL-CCNC: 122 U/L (ref 73–393)
LYMPHOCYTES # BLD AUTO: 1.28 THOUSAND/UL (ref 0.6–4.47)
LYMPHOCYTES # BLD AUTO: 9 % (ref 14–44)
MCH RBC QN AUTO: 30.2 PG (ref 26.8–34.3)
MCHC RBC AUTO-ENTMCNC: 32.9 G/DL (ref 31.4–37.4)
MCV RBC AUTO: 92 FL (ref 82–98)
MONOCYTES # BLD AUTO: 0.43 THOUSAND/UL (ref 0–1.22)
MONOCYTES NFR BLD: 3 % (ref 4–12)
NEUTROPHILS # BLD MANUAL: 12.24 THOUSAND/UL (ref 1.85–7.62)
NEUTS BAND NFR BLD MANUAL: 2 % (ref 0–8)
NEUTS SEG NFR BLD AUTO: 84 % (ref 43–75)
NITRITE UR QL STRIP: NEGATIVE
NON-SQ EPI CELLS URNS QL MICRO: ABNORMAL /HPF
NRBC BLD AUTO-RTO: 0 /100 WBCS
PH UR STRIP.AUTO: 7 [PH]
PLATELET # BLD AUTO: 280 THOUSANDS/UL (ref 149–390)
PLATELET BLD QL SMEAR: ADEQUATE
PMV BLD AUTO: 9.3 FL (ref 8.9–12.7)
POTASSIUM SERPL-SCNC: 3.6 MMOL/L (ref 3.5–5.3)
PROT SERPL-MCNC: 8.5 G/DL (ref 6.4–8.2)
PROT UR STRIP-MCNC: NEGATIVE MG/DL
RBC # BLD AUTO: 4.73 MILLION/UL (ref 3.81–5.12)
RBC #/AREA URNS AUTO: ABNORMAL /HPF
SODIUM SERPL-SCNC: 142 MMOL/L (ref 136–145)
SP GR UR STRIP.AUTO: 1.01 (ref 1–1.03)
TOTAL CELLS COUNTED SPEC: 100
UROBILINOGEN UR QL STRIP.AUTO: 0.2 E.U./DL
WBC # BLD AUTO: 14.23 THOUSAND/UL (ref 4.31–10.16)
WBC #/AREA URNS AUTO: ABNORMAL /HPF

## 2020-10-21 PROCEDURE — 96374 THER/PROPH/DIAG INJ IV PUSH: CPT

## 2020-10-21 PROCEDURE — 81025 URINE PREGNANCY TEST: CPT | Performed by: EMERGENCY MEDICINE

## 2020-10-21 PROCEDURE — 83690 ASSAY OF LIPASE: CPT | Performed by: EMERGENCY MEDICINE

## 2020-10-21 PROCEDURE — 81001 URINALYSIS AUTO W/SCOPE: CPT | Performed by: EMERGENCY MEDICINE

## 2020-10-21 PROCEDURE — G1004 CDSM NDSC: HCPCS

## 2020-10-21 PROCEDURE — 99284 EMERGENCY DEPT VISIT MOD MDM: CPT

## 2020-10-21 PROCEDURE — 74177 CT ABD & PELVIS W/CONTRAST: CPT

## 2020-10-21 PROCEDURE — 80053 COMPREHEN METABOLIC PANEL: CPT | Performed by: EMERGENCY MEDICINE

## 2020-10-21 PROCEDURE — 99285 EMERGENCY DEPT VISIT HI MDM: CPT | Performed by: EMERGENCY MEDICINE

## 2020-10-21 PROCEDURE — 85007 BL SMEAR W/DIFF WBC COUNT: CPT | Performed by: EMERGENCY MEDICINE

## 2020-10-21 PROCEDURE — 85027 COMPLETE CBC AUTOMATED: CPT | Performed by: EMERGENCY MEDICINE

## 2020-10-21 PROCEDURE — 36415 COLL VENOUS BLD VENIPUNCTURE: CPT | Performed by: EMERGENCY MEDICINE

## 2020-10-21 RX ORDER — MORPHINE SULFATE 4 MG/ML
4 INJECTION, SOLUTION INTRAMUSCULAR; INTRAVENOUS ONCE
Status: DISCONTINUED | OUTPATIENT
Start: 2020-10-21 | End: 2020-10-21 | Stop reason: HOSPADM

## 2020-10-21 RX ORDER — TAMSULOSIN HYDROCHLORIDE 0.4 MG/1
0.4 CAPSULE ORAL
Qty: 5 CAPSULE | Refills: 0 | Status: SHIPPED | OUTPATIENT
Start: 2020-10-21 | End: 2020-10-21 | Stop reason: SDUPTHER

## 2020-10-21 RX ORDER — OXYCODONE HYDROCHLORIDE AND ACETAMINOPHEN 5; 325 MG/1; MG/1
1 TABLET ORAL EVERY 4 HOURS PRN
Qty: 20 TABLET | Refills: 0 | Status: SHIPPED | OUTPATIENT
Start: 2020-10-21 | End: 2020-10-31

## 2020-10-21 RX ORDER — IBUPROFEN 600 MG/1
600 TABLET ORAL EVERY 6 HOURS PRN
Qty: 30 TABLET | Refills: 0 | Status: SHIPPED | OUTPATIENT
Start: 2020-10-21 | End: 2022-05-26

## 2020-10-21 RX ORDER — ONDANSETRON 4 MG/1
4 TABLET, ORALLY DISINTEGRATING ORAL EVERY 8 HOURS PRN
Qty: 20 TABLET | Refills: 0 | Status: SHIPPED | OUTPATIENT
Start: 2020-10-21 | End: 2020-10-21 | Stop reason: SDUPTHER

## 2020-10-21 RX ORDER — ONDANSETRON 4 MG/1
4 TABLET, ORALLY DISINTEGRATING ORAL EVERY 8 HOURS PRN
Qty: 20 TABLET | Refills: 0 | Status: SHIPPED | OUTPATIENT
Start: 2020-10-21 | End: 2020-11-20

## 2020-10-21 RX ORDER — KETOROLAC TROMETHAMINE 30 MG/ML
30 INJECTION, SOLUTION INTRAMUSCULAR; INTRAVENOUS ONCE
Status: COMPLETED | OUTPATIENT
Start: 2020-10-21 | End: 2020-10-21

## 2020-10-21 RX ORDER — IBUPROFEN 600 MG/1
600 TABLET ORAL EVERY 6 HOURS PRN
Qty: 30 TABLET | Refills: 0 | Status: SHIPPED | OUTPATIENT
Start: 2020-10-21 | End: 2020-10-21 | Stop reason: SDUPTHER

## 2020-10-21 RX ORDER — OXYCODONE HYDROCHLORIDE AND ACETAMINOPHEN 5; 325 MG/1; MG/1
1 TABLET ORAL EVERY 4 HOURS PRN
Qty: 20 TABLET | Refills: 0 | Status: SHIPPED | OUTPATIENT
Start: 2020-10-21 | End: 2020-10-21 | Stop reason: SDUPTHER

## 2020-10-21 RX ORDER — TAMSULOSIN HYDROCHLORIDE 0.4 MG/1
0.4 CAPSULE ORAL ONCE
Status: COMPLETED | OUTPATIENT
Start: 2020-10-21 | End: 2020-10-21

## 2020-10-21 RX ORDER — CEPHALEXIN 500 MG/1
500 CAPSULE ORAL 2 TIMES DAILY
Qty: 14 CAPSULE | Refills: 0 | Status: SHIPPED | OUTPATIENT
Start: 2020-10-21 | End: 2020-10-28

## 2020-10-21 RX ORDER — TAMSULOSIN HYDROCHLORIDE 0.4 MG/1
0.4 CAPSULE ORAL
Qty: 5 CAPSULE | Refills: 0 | Status: SHIPPED | OUTPATIENT
Start: 2020-10-21

## 2020-10-21 RX ORDER — OXYCODONE HYDROCHLORIDE AND ACETAMINOPHEN 5; 325 MG/1; MG/1
2 TABLET ORAL ONCE
Status: COMPLETED | OUTPATIENT
Start: 2020-10-21 | End: 2020-10-21

## 2020-10-21 RX ORDER — CEPHALEXIN 250 MG/1
500 CAPSULE ORAL ONCE
Status: COMPLETED | OUTPATIENT
Start: 2020-10-21 | End: 2020-10-21

## 2020-10-21 RX ORDER — CEPHALEXIN 500 MG/1
500 CAPSULE ORAL 2 TIMES DAILY
Qty: 14 CAPSULE | Refills: 0 | Status: SHIPPED | OUTPATIENT
Start: 2020-10-21 | End: 2020-10-21 | Stop reason: SDUPTHER

## 2020-10-21 RX ADMIN — KETOROLAC TROMETHAMINE 30 MG: 30 INJECTION, SOLUTION INTRAMUSCULAR at 16:23

## 2020-10-21 RX ADMIN — TAMSULOSIN HYDROCHLORIDE 0.4 MG: 0.4 CAPSULE ORAL at 20:03

## 2020-10-21 RX ADMIN — CEPHALEXIN 500 MG: 250 CAPSULE ORAL at 20:03

## 2020-10-21 RX ADMIN — OXYCODONE HYDROCHLORIDE AND ACETAMINOPHEN 2 TABLET: 5; 325 TABLET ORAL at 20:03

## 2020-10-21 RX ADMIN — IOHEXOL 100 ML: 350 INJECTION, SOLUTION INTRAVENOUS at 17:57

## 2020-10-27 ENCOUNTER — TELEPHONE (OUTPATIENT)
Dept: UROLOGY | Facility: MEDICAL CENTER | Age: 55
End: 2020-10-27

## 2020-10-30 ENCOUNTER — APPOINTMENT (EMERGENCY)
Dept: CT IMAGING | Facility: HOSPITAL | Age: 55
End: 2020-10-30
Payer: COMMERCIAL

## 2020-10-30 ENCOUNTER — HOSPITAL ENCOUNTER (EMERGENCY)
Facility: HOSPITAL | Age: 55
Discharge: HOME | End: 2020-10-30
Attending: EMERGENCY MEDICINE
Payer: COMMERCIAL

## 2020-10-30 VITALS
TEMPERATURE: 98.3 F | BODY MASS INDEX: 23.9 KG/M2 | SYSTOLIC BLOOD PRESSURE: 149 MMHG | WEIGHT: 157.19 LBS | RESPIRATION RATE: 16 BRPM | OXYGEN SATURATION: 98 % | DIASTOLIC BLOOD PRESSURE: 81 MMHG | HEART RATE: 89 BPM

## 2020-10-30 DIAGNOSIS — N39.0 UTI (URINARY TRACT INFECTION): ICD-10-CM

## 2020-10-30 DIAGNOSIS — N20.0 KIDNEY STONE: Primary | ICD-10-CM

## 2020-10-30 LAB
ALBUMIN SERPL BCP-MCNC: 3.3 G/DL (ref 3.5–5)
ALP SERPL-CCNC: 136 U/L (ref 46–116)
ALT SERPL W P-5'-P-CCNC: 39 U/L (ref 12–78)
ANION GAP SERPL CALCULATED.3IONS-SCNC: 12 MMOL/L (ref 4–13)
APTT PPP: 37 SECONDS (ref 23–37)
AST SERPL W P-5'-P-CCNC: 20 U/L (ref 5–45)
BACTERIA UR QL AUTO: ABNORMAL /HPF
BASOPHILS # BLD MANUAL: 0.27 THOUSAND/UL (ref 0–0.1)
BASOPHILS NFR MAR MANUAL: 3 % (ref 0–1)
BILIRUB SERPL-MCNC: 0.2 MG/DL (ref 0.2–1)
BILIRUB UR QL STRIP: NEGATIVE
BUN SERPL-MCNC: 16 MG/DL (ref 5–25)
CALCIUM ALBUM COR SERPL-MCNC: 10.5 MG/DL (ref 8.3–10.1)
CALCIUM SERPL-MCNC: 9.9 MG/DL (ref 8.3–10.1)
CHLORIDE SERPL-SCNC: 109 MMOL/L (ref 100–108)
CLARITY UR: CLEAR
CO2 SERPL-SCNC: 22 MMOL/L (ref 21–32)
COLOR UR: YELLOW
CREAT SERPL-MCNC: 1.12 MG/DL (ref 0.6–1.3)
EOSINOPHIL # BLD MANUAL: 0.53 THOUSAND/UL (ref 0–0.4)
EOSINOPHIL NFR BLD MANUAL: 6 % (ref 0–6)
ERYTHROCYTE [DISTWIDTH] IN BLOOD BY AUTOMATED COUNT: 13 % (ref 11.6–15.1)
EXT PREG TEST URINE: NEGATIVE
EXT. CONTROL ED NAV: NORMAL
GFR SERPL CREATININE-BSD FRML MDRD: 56 ML/MIN/1.73SQ M
GLUCOSE SERPL-MCNC: 107 MG/DL (ref 65–140)
GLUCOSE UR STRIP-MCNC: NEGATIVE MG/DL
HCT VFR BLD AUTO: 37.5 % (ref 34.8–46.1)
HGB BLD-MCNC: 12.2 G/DL (ref 11.5–15.4)
HGB UR QL STRIP.AUTO: NEGATIVE
INR PPP: 1.08 (ref 0.84–1.19)
KETONES UR STRIP-MCNC: NEGATIVE MG/DL
LEUKOCYTE ESTERASE UR QL STRIP: ABNORMAL
LYMPHOCYTES # BLD AUTO: 1.42 THOUSAND/UL (ref 0.6–4.47)
LYMPHOCYTES # BLD AUTO: 16 % (ref 14–44)
MCH RBC QN AUTO: 29.9 PG (ref 26.8–34.3)
MCHC RBC AUTO-ENTMCNC: 32.5 G/DL (ref 31.4–37.4)
MCV RBC AUTO: 92 FL (ref 82–98)
MONOCYTES # BLD AUTO: 0.62 THOUSAND/UL (ref 0–1.22)
MONOCYTES NFR BLD: 7 % (ref 4–12)
MYELOCYTES NFR BLD MANUAL: 11 % (ref 0–1)
NEUTROPHILS # BLD MANUAL: 5.07 THOUSAND/UL (ref 1.85–7.62)
NEUTS SEG NFR BLD AUTO: 57 % (ref 43–75)
NITRITE UR QL STRIP: NEGATIVE
NON-SQ EPI CELLS URNS QL MICRO: ABNORMAL /HPF
NRBC BLD AUTO-RTO: 0 /100 WBCS
PH UR STRIP.AUTO: 6.5 [PH]
PLATELET # BLD AUTO: 417 THOUSANDS/UL (ref 149–390)
PLATELET BLD QL SMEAR: ABNORMAL
PMV BLD AUTO: 8.8 FL (ref 8.9–12.7)
POTASSIUM SERPL-SCNC: 3.8 MMOL/L (ref 3.5–5.3)
PROT SERPL-MCNC: 7.8 G/DL (ref 6.4–8.2)
PROT UR STRIP-MCNC: NEGATIVE MG/DL
PROTHROMBIN TIME: 14.2 SECONDS (ref 11.6–14.5)
RBC # BLD AUTO: 4.08 MILLION/UL (ref 3.81–5.12)
RBC #/AREA URNS AUTO: ABNORMAL /HPF
SODIUM SERPL-SCNC: 143 MMOL/L (ref 136–145)
SP GR UR STRIP.AUTO: 1.01 (ref 1–1.03)
TOTAL CELLS COUNTED SPEC: 100
UROBILINOGEN UR QL STRIP.AUTO: 0.2 E.U./DL
WBC # BLD AUTO: 8.89 THOUSAND/UL (ref 4.31–10.16)
WBC #/AREA URNS AUTO: ABNORMAL /HPF

## 2020-10-30 PROCEDURE — 85610 PROTHROMBIN TIME: CPT | Performed by: PHYSICIAN ASSISTANT

## 2020-10-30 PROCEDURE — 85730 THROMBOPLASTIN TIME PARTIAL: CPT | Performed by: PHYSICIAN ASSISTANT

## 2020-10-30 PROCEDURE — 96375 TX/PRO/DX INJ NEW DRUG ADDON: CPT

## 2020-10-30 PROCEDURE — 81025 URINE PREGNANCY TEST: CPT | Performed by: PHYSICIAN ASSISTANT

## 2020-10-30 PROCEDURE — 87086 URINE CULTURE/COLONY COUNT: CPT | Performed by: PHYSICIAN ASSISTANT

## 2020-10-30 PROCEDURE — 99284 EMERGENCY DEPT VISIT MOD MDM: CPT | Performed by: PHYSICIAN ASSISTANT

## 2020-10-30 PROCEDURE — G1004 CDSM NDSC: HCPCS

## 2020-10-30 PROCEDURE — 80053 COMPREHEN METABOLIC PANEL: CPT | Performed by: PHYSICIAN ASSISTANT

## 2020-10-30 PROCEDURE — 85007 BL SMEAR W/DIFF WBC COUNT: CPT | Performed by: PHYSICIAN ASSISTANT

## 2020-10-30 PROCEDURE — 36415 COLL VENOUS BLD VENIPUNCTURE: CPT | Performed by: PHYSICIAN ASSISTANT

## 2020-10-30 PROCEDURE — 99284 EMERGENCY DEPT VISIT MOD MDM: CPT

## 2020-10-30 PROCEDURE — 87077 CULTURE AEROBIC IDENTIFY: CPT | Performed by: PHYSICIAN ASSISTANT

## 2020-10-30 PROCEDURE — 74176 CT ABD & PELVIS W/O CONTRAST: CPT

## 2020-10-30 PROCEDURE — 85027 COMPLETE CBC AUTOMATED: CPT | Performed by: PHYSICIAN ASSISTANT

## 2020-10-30 PROCEDURE — 96374 THER/PROPH/DIAG INJ IV PUSH: CPT

## 2020-10-30 PROCEDURE — 81001 URINALYSIS AUTO W/SCOPE: CPT | Performed by: PHYSICIAN ASSISTANT

## 2020-10-30 PROCEDURE — 87186 SC STD MICRODIL/AGAR DIL: CPT | Performed by: PHYSICIAN ASSISTANT

## 2020-10-30 RX ORDER — ONDANSETRON 2 MG/ML
4 INJECTION INTRAMUSCULAR; INTRAVENOUS ONCE
Status: COMPLETED | OUTPATIENT
Start: 2020-10-30 | End: 2020-10-30

## 2020-10-30 RX ORDER — PHENAZOPYRIDINE HYDROCHLORIDE 100 MG/1
100 TABLET, FILM COATED ORAL 3 TIMES DAILY PRN
Qty: 6 TABLET | Refills: 0 | Status: SHIPPED | OUTPATIENT
Start: 2020-10-30 | End: 2020-11-01

## 2020-10-30 RX ORDER — KETOROLAC TROMETHAMINE 30 MG/ML
30 INJECTION, SOLUTION INTRAMUSCULAR; INTRAVENOUS ONCE
Status: COMPLETED | OUTPATIENT
Start: 2020-10-30 | End: 2020-10-30

## 2020-10-30 RX ORDER — CEPHALEXIN 500 MG/1
500 CAPSULE ORAL 3 TIMES DAILY
Qty: 15 CAPSULE | Refills: 0 | Status: SHIPPED | OUTPATIENT
Start: 2020-10-30 | End: 2020-11-04

## 2020-10-30 RX ADMIN — KETOROLAC TROMETHAMINE 30 MG: 30 INJECTION, SOLUTION INTRAMUSCULAR at 10:22

## 2020-10-30 RX ADMIN — ONDANSETRON 4 MG: 2 INJECTION INTRAMUSCULAR; INTRAVENOUS at 10:22

## 2020-11-01 LAB — BACTERIA UR CULT: ABNORMAL

## 2020-11-09 ENCOUNTER — OFFICE VISIT (OUTPATIENT)
Dept: UROLOGY | Facility: CLINIC | Age: 55
End: 2020-11-09
Payer: COMMERCIAL

## 2020-11-09 VITALS
BODY MASS INDEX: 23.82 KG/M2 | DIASTOLIC BLOOD PRESSURE: 80 MMHG | SYSTOLIC BLOOD PRESSURE: 110 MMHG | HEIGHT: 68 IN | WEIGHT: 157.2 LBS | HEART RATE: 96 BPM

## 2020-11-09 DIAGNOSIS — Q61.5 MEDULLARY SPONGE KIDNEY OF BOTH KIDNEYS: Primary | ICD-10-CM

## 2020-11-09 DIAGNOSIS — N20.0 CALCULUS OF KIDNEY: ICD-10-CM

## 2020-11-09 PROCEDURE — 99204 OFFICE O/P NEW MOD 45 MIN: CPT | Performed by: PHYSICIAN ASSISTANT

## 2020-11-09 RX ORDER — LAMOTRIGINE 200 MG/1
TABLET ORAL
COMMUNITY
Start: 2020-10-26 | End: 2021-05-03 | Stop reason: SDUPTHER

## 2020-11-09 RX ORDER — TOPIRAMATE 100 MG/1
TABLET, FILM COATED ORAL
COMMUNITY
Start: 2020-10-26 | End: 2021-02-18

## 2020-11-13 ENCOUNTER — TELEPHONE (OUTPATIENT)
Dept: NEPHROLOGY | Facility: CLINIC | Age: 55
End: 2020-11-13

## 2020-11-16 ENCOUNTER — TELEPHONE (OUTPATIENT)
Dept: NEPHROLOGY | Facility: CLINIC | Age: 55
End: 2020-11-16

## 2020-11-30 ENCOUNTER — TELEPHONE (OUTPATIENT)
Dept: NEPHROLOGY | Facility: CLINIC | Age: 55
End: 2020-11-30

## 2020-12-15 ENCOUNTER — TELEPHONE (OUTPATIENT)
Dept: NEPHROLOGY | Facility: CLINIC | Age: 55
End: 2020-12-15

## 2020-12-21 ENCOUNTER — TELEPHONE (OUTPATIENT)
Dept: NEPHROLOGY | Facility: CLINIC | Age: 55
End: 2020-12-21

## 2021-01-20 ENCOUNTER — TELEPHONE (OUTPATIENT)
Dept: NEPHROLOGY | Facility: CLINIC | Age: 56
End: 2021-01-20

## 2021-01-20 NOTE — TELEPHONE ENCOUNTER
I called [employer]) @ [phone] to check eligible for the patient and as of 1/20/2021 the patient has current active coverage as of 1/20/2021   Radha Rodriguez,

## 2021-01-25 DIAGNOSIS — R56.9 SEIZURE (HCC): Primary | ICD-10-CM

## 2021-02-18 RX ORDER — TOPIRAMATE 100 MG/1
TABLET, FILM COATED ORAL
Qty: 180 TABLET | Refills: 3 | Status: SHIPPED | OUTPATIENT
Start: 2021-02-18 | End: 2021-05-13 | Stop reason: SDUPTHER

## 2021-02-18 NOTE — TELEPHONE ENCOUNTER
Patient scheduled for 05/13/21 with Dr Ingrid Hart can you please send refill? Patient only has a week's supply left

## 2021-02-24 ENCOUNTER — TELEPHONE (OUTPATIENT)
Dept: NEUROLOGY | Facility: CLINIC | Age: 56
End: 2021-02-24

## 2021-05-03 DIAGNOSIS — G40.019 INTRACTABLE LOCALIZATION-RELATED EPILEPSY (HCC): Primary | ICD-10-CM

## 2021-05-03 RX ORDER — LAMOTRIGINE 200 MG/1
200 TABLET ORAL 2 TIMES DAILY
Qty: 60 TABLET | Refills: 0 | Status: SHIPPED | OUTPATIENT
Start: 2021-05-03 | End: 2021-05-13 | Stop reason: SDUPTHER

## 2021-05-03 NOTE — TELEPHONE ENCOUNTER
Patient calling for a refill of lamictal 200 mg  Please sign if agreeable  Asking for a 30 day fill until she sees you  Said that she [facility]

## 2021-05-13 ENCOUNTER — OFFICE VISIT (OUTPATIENT)
Dept: NEUROLOGY | Facility: CLINIC | Age: 56
End: 2021-05-13
Payer: COMMERCIAL

## 2021-05-13 VITALS
SYSTOLIC BLOOD PRESSURE: 108 MMHG | HEART RATE: 94 BPM | WEIGHT: 152 LBS | DIASTOLIC BLOOD PRESSURE: 70 MMHG | BODY MASS INDEX: 23.04 KG/M2 | HEIGHT: 68 IN

## 2021-05-13 DIAGNOSIS — G40.019 INTRACTABLE LOCALIZATION-RELATED EPILEPSY (HCC): ICD-10-CM

## 2021-05-13 DIAGNOSIS — G40.219 LOCALIZATION-RELATED SYMPTOMATIC EPILEPSY AND EPILEPTIC SYNDROMES WITH COMPLEX PARTIAL SEIZURES, INTRACTABLE, WITHOUT STATUS EPILEPTICUS (HCC): Primary | ICD-10-CM

## 2021-05-13 DIAGNOSIS — N20.0 NEPHROLITHIASIS: ICD-10-CM

## 2021-05-13 DIAGNOSIS — R56.9 SEIZURE (HCC): ICD-10-CM

## 2021-05-13 PROCEDURE — 99214 OFFICE O/P EST MOD 30 MIN: CPT | Performed by: PSYCHIATRY & NEUROLOGY

## 2021-05-13 RX ORDER — LAMOTRIGINE 200 MG/1
200 TABLET ORAL 2 TIMES DAILY
Qty: 180 TABLET | Refills: 3 | Status: SHIPPED | OUTPATIENT
Start: 2021-05-13 | End: 2022-05-26 | Stop reason: SDUPTHER

## 2021-05-13 RX ORDER — ESCITALOPRAM OXALATE 10 MG/1
TABLET ORAL
COMMUNITY
Start: 2021-05-10

## 2021-05-13 RX ORDER — TOPIRAMATE 100 MG/1
100 TABLET, FILM COATED ORAL 2 TIMES DAILY
Qty: 180 TABLET | Refills: 3 | Status: SHIPPED | OUTPATIENT
Start: 2021-05-13 | End: 2022-05-26 | Stop reason: SDUPTHER

## 2021-05-13 NOTE — PROGRESS NOTES
Patient ID: Yeimi Soliman is a 55 y o  female with localization related epilepsy, s/p left anterior temporal lobectomy at [address] [place] [address] in 2007, anxiety, depression, parathyroid disease, who is returning to Neurology office for follow up of her seizures  Assessment/Plan:    Localization-related symptomatic epilepsy and epileptic syndromes with complex partial seizures, intractable, without status epilepticus ([address] )   She has not had any additional seizures since 2018 when adjusting her medicines to her current doses  She has overall been doing well since then  She denies any definite side effects to her medications  She did ask the question of whether not she would be able to decrease any of her medicines  This is not because of any medication side effects, but because she would like to try to take less pills of possible  I discussed that given her history where she was having seizures even up to 10-11 years after her temporal lobectomy, I would be reluctant to adjust her medicines lower since there is a high a potential that her seizures could return  She very much did not want to run the risk of having another seizure, so was preferring to keep her medicines unchanged  --she will continue lamotrigine 200 mg twice a day and topiramate 100 mg twice a day  If she would have additional seizures, her dose of 1 of her medications could be increased  Nephrolithiasis    She recently was in the hospital because of kidney stone  It appears it is the first 1 she had experienced in about 10 years  I did discuss that topiramate can contribute to the formation of kidney stones, but since her kidney stones are not occurring frequently, it is unlikely that the topiramate is contributing significantly      I asked her to discuss this further with her urologist, but at this point I would not recommend changing her seizure medications since they have been quite effective and kidney stones are very infrequent  If she would have more frequent kidney stones, I would recommend getting a stone analysis to determine if this stones are from topiramate        She will Return in about 1 year (around 5/13/2022)  Subjective:    HPI  Current seizure medications:  1  Lamotrigine 200mg twice a day  2  Topiramate 100 mg twice a day  3  Depo-provera injections every 3 months  Other medications as per Epic  She previously was following with Dr Buddy Neumann in the office  Please see prior notes for full details, but of note, she did have a left anterior temporal lobectomy in 2007 which did have significant improvement in her seizures, limiting her focal to bilateral tonic-clonic seizures  She was last seen in the office by Sandra Whitley last year and had been doing well without additional seizures, so no changes were made to her medications  Since her last visit, she hasn't had any additional seizures  She denies any auras or other problems  She has noted some weight gain  She continues to have problems with her memory, but overall feels this has been better since her surgery  She did have a kidney stone back in October 2020  That was the first she had experienced in 10 years  She is following with Urology  Prior Seizure Medications: Phenytoin, Zonisamide, Oxcarbazepine, carbamazepine, pregabalin  Neurontin sounds familiar    I reviewed  Reason notes including notes from her hospitalization for kidney stone in October 2020, as [facility], and summarized above  Objective:    Blood pressure 108/70, pulse 94, height 5' 8" (1 727 m), weight 68 9 kg (152 lb)  Physical Exam    Neurological Exam      ROS:    Review of Systems   Constitutional: Positive for fatigue  Negative for appetite change and fever  HENT: Negative  Negative for hearing loss, tinnitus, trouble swallowing and voice change  Eyes: Negative  Negative for photophobia and pain  Respiratory: Negative  Negative for shortness of breath  Cardiovascular: Negative  Negative for palpitations  Gastrointestinal: Negative  Negative for nausea and vomiting  Endocrine: Negative  Negative for cold intolerance  Genitourinary: Negative  Negative for dysuria, frequency and urgency  Musculoskeletal: Negative  Negative for back pain, gait problem, myalgias and neck pain  Skin: Negative  Negative for rash  Neurological: Positive for speech difficulty  Negative for dizziness, tremors, seizures, syncope, facial asymmetry, weakness, light-headedness, numbness and headaches  Hematological: Negative  Does not bruise/bleed easily  Psychiatric/Behavioral: Positive for confusion  Negative for hallucinations and sleep disturbance         I personally reviewed the ROS that was entered by the medical assistant

## 2021-05-13 NOTE — ASSESSMENT & PLAN NOTE
She recently was in the hospital because of kidney stone  It appears it is the first 1 she had experienced in about 10 years  I did discuss that topiramate can contribute to the formation of kidney stones, but since her kidney stones are not occurring frequently, it is unlikely that the topiramate is contributing significantly  I asked her to discuss this further with her urologist, but at this point I would not recommend changing her seizure medications since they have been quite effective and kidney stones are very infrequent    If she would have more frequent kidney stones, I would recommend getting a stone analysis to determine if this stones are from topiramate

## 2021-05-13 NOTE — ASSESSMENT & PLAN NOTE
She has not had any additional seizures since 2018 when adjusting her medicines to her current doses  She has overall been doing well since then  She denies any definite side effects to her medications  She did ask the question of whether not she would be able to decrease any of her medicines  This is not because of any medication side effects, but because she would like to try to take less pills of possible  I discussed that given her history where she was having seizures even up to 10-11 years after her temporal lobectomy, I would be reluctant to adjust her medicines lower since there is a high a potential that her seizures could return  She very much did not want to run the risk of having another seizure, so was preferring to keep her medicines unchanged  --she will continue lamotrigine 200 mg twice a day and topiramate 100 mg twice a day  If she would have additional seizures, her dose of 1 of her medications could be increased

## 2021-06-02 ENCOUNTER — TELEPHONE (OUTPATIENT)
Dept: NEPHROLOGY | Facility: CLINIC | Age: 56
End: 2021-06-02

## 2021-06-03 ENCOUNTER — TELEPHONE (OUTPATIENT)
Dept: NEPHROLOGY | Facility: CLINIC | Age: 56
End: 2021-06-03

## 2021-06-03 NOTE — TELEPHONE ENCOUNTER
Patient LM cancelling appt with Dr Denita Mckeon due to sched conflict - stated she will call back to reschedule,

## 2022-05-26 ENCOUNTER — OFFICE VISIT (OUTPATIENT)
Dept: NEUROLOGY | Facility: CLINIC | Age: 57
End: 2022-05-26
Payer: COMMERCIAL

## 2022-05-26 VITALS
HEART RATE: 102 BPM | SYSTOLIC BLOOD PRESSURE: 110 MMHG | BODY MASS INDEX: 26.37 KG/M2 | HEIGHT: 68 IN | WEIGHT: 174 LBS | DIASTOLIC BLOOD PRESSURE: 76 MMHG

## 2022-05-26 DIAGNOSIS — G40.019 INTRACTABLE LOCALIZATION-RELATED EPILEPSY (HCC): ICD-10-CM

## 2022-05-26 DIAGNOSIS — G40.219 LOCALIZATION-RELATED SYMPTOMATIC EPILEPSY AND EPILEPTIC SYNDROMES WITH COMPLEX PARTIAL SEIZURES, INTRACTABLE, WITHOUT STATUS EPILEPTICUS (HCC): Primary | ICD-10-CM

## 2022-05-26 DIAGNOSIS — R56.9 SEIZURE (HCC): ICD-10-CM

## 2022-05-26 PROCEDURE — 99213 OFFICE O/P EST LOW 20 MIN: CPT | Performed by: PSYCHIATRY & NEUROLOGY

## 2022-05-26 RX ORDER — TOPIRAMATE 100 MG/1
100 TABLET, FILM COATED ORAL 2 TIMES DAILY
Qty: 180 TABLET | Refills: 3 | Status: SHIPPED | OUTPATIENT
Start: 2022-05-26

## 2022-05-26 RX ORDER — LAMOTRIGINE 200 MG/1
200 TABLET ORAL 2 TIMES DAILY
Qty: 180 TABLET | Refills: 3 | Status: SHIPPED | OUTPATIENT
Start: 2022-05-26

## 2022-05-26 NOTE — ASSESSMENT & PLAN NOTE
She continues to be seizure free on her current combination of medications  Because of her prior history with additional seizures even after he epilepsy surgery, she likely would benefit from continuing on medications going forward  -- she will continue lamotrigine and topiramate unchanged  If she would have any additional seizures, one of her medications could be increased

## 2022-05-26 NOTE — PROGRESS NOTES
Patient ID: Fabiola Norman is a 65 y o  female with localization related epilepsy, s/p left anterior temporal lobectomy at [address] in 2007, anxiety, depression, parathyroid disease, who is returning to Neurology office for follow up of her seizures  Assessment/Plan:    Localization-related symptomatic epilepsy and epileptic syndromes with complex partial seizures, intractable, without status epilepticus ([address] )  She continues to be seizure free on her current combination of medications  Because of her prior history with additional seizures even after he epilepsy surgery, she likely would benefit from continuing on medications going forward  -- she will continue lamotrigine and topiramate unchanged  If she would have any additional seizures, one of her medications could be increased  She will Return in about 1 year (around 5/26/2023)  Subjective:    HPI  Current seizure medications:  1  Lamotrigine 200 mg twice a day   2  Topiramate 100 mg twice a day  Other medications as per Epic  Since her last visit, she has been doing well  She denies any seizures or any side effects with her medication  She has not had any additional kidney stones since her last visit as well  She was dx with lymphoma last June  She underwent chemotherapy and is now in remission  She is still getting periodic surveillance, which has been unremarkable  Prior Seizure Medications: Phenytoin, Zonisamide, Oxcarbazepine, carbamazepine, pregabalin  Neurontin sounds familiar         Objective:    Blood pressure 110/76, pulse 102, height 5' 8" (1 727 m), weight 78 9 kg (174 lb)  Physical Exam    Neurological Exam      ROS:    Review of Systems   Constitutional: Negative  Negative for appetite change and fever  HENT: Negative  Negative for hearing loss, tinnitus, trouble swallowing and voice change  Eyes: Negative  Negative for photophobia and pain  Respiratory: Negative    Negative for shortness of breath  Cardiovascular: Negative  Negative for palpitations  Gastrointestinal: Negative  Negative for nausea and vomiting  Endocrine: Negative  Negative for cold intolerance  Genitourinary: Negative  Negative for dysuria, frequency and urgency  Musculoskeletal: Negative  Negative for myalgias and neck pain  Skin: Negative  Negative for rash  Neurological: Negative  Negative for dizziness, tremors, seizures, syncope, facial asymmetry, speech difficulty, weakness, light-headedness, numbness and headaches  Hematological: Negative  Does not bruise/bleed easily  Psychiatric/Behavioral: Negative  Negative for confusion, hallucinations and sleep disturbance  I personally reviewed the ROS that was entered by the medical assistant    Voice recognition software was used in the generation of this note  There may be unintentional errors including grammatical errors, spelling errors, or pronoun errors

## 2023-05-08 ENCOUNTER — TELEPHONE (OUTPATIENT)
Dept: NEUROLOGY | Facility: CLINIC | Age: 58
End: 2023-05-08

## 2023-05-08 NOTE — TELEPHONE ENCOUNTER
Pt was schedueld for 1 year f/u from Dr Marc Gates with  [facility] on 5/26  Pt needs to be seen by Adriana as Kyung is not seeing seizure Pts  Pt can be moved up a week sooner  Please r/s when she calls back  When I spoke to her she was at the Drs and said she would call back to r/s appt

## 2023-05-09 NOTE — TELEPHONE ENCOUNTER
Patient called back in  Reviewed Dr Latricia Bunn schedule in [facility], 3pm opening on Tues, 5/16/23  Patient accepted

## 2023-05-16 ENCOUNTER — OFFICE VISIT (OUTPATIENT)
Dept: NEUROLOGY | Facility: CLINIC | Age: 58
End: 2023-05-16

## 2023-05-16 VITALS
BODY MASS INDEX: 25.91 KG/M2 | WEIGHT: 171 LBS | SYSTOLIC BLOOD PRESSURE: 124 MMHG | HEIGHT: 68 IN | DIASTOLIC BLOOD PRESSURE: 80 MMHG

## 2023-05-16 DIAGNOSIS — R56.9 SEIZURE (HCC): ICD-10-CM

## 2023-05-16 DIAGNOSIS — G40.019 INTRACTABLE LOCALIZATION-RELATED EPILEPSY (HCC): ICD-10-CM

## 2023-05-16 DIAGNOSIS — G40.219 LOCALIZATION-RELATED SYMPTOMATIC EPILEPSY AND EPILEPTIC SYNDROMES WITH COMPLEX PARTIAL SEIZURES, INTRACTABLE, WITHOUT STATUS EPILEPTICUS (HCC): Primary | ICD-10-CM

## 2023-05-16 RX ORDER — LAMOTRIGINE 200 MG/1
200 TABLET ORAL 2 TIMES DAILY
Qty: 180 TABLET | Refills: 3 | Status: SHIPPED | OUTPATIENT
Start: 2023-05-16

## 2023-05-16 RX ORDER — TOPIRAMATE 100 MG/1
100 TABLET, FILM COATED ORAL 2 TIMES DAILY
Qty: 180 TABLET | Refills: 3 | Status: SHIPPED | OUTPATIENT
Start: 2023-05-16

## 2023-05-16 NOTE — PROGRESS NOTES
"Patient ID: Dontrell Zhao is a 63 y o  female with localization related epilepsy, s/p left anterior temporal lobectomy at [address] in 2007, anxiety, depression, parathyroid disease, who is returning to Neurology office for follow up of her seizures  Assessment/Plan:    Localization-related symptomatic epilepsy and epileptic syndromes with complex partial seizures, intractable, without status epilepticus ([address] )  She continues to be seizure-free on lamotrigine and topiramate without any significant side effects  At this point, I would continue to recommend that she stay on seizure medications given her difficult to control seizures and seizures even following her epilepsy surgery in the past     --She will continue lamotrigine and topiramate unchanged  If she would have additional seizures, one of her medications could be increased  --I will have her get a lamotrigine level and a topiramate level checked when she next gets blood work        She will Return in about 1 year (around 5/16/2024)  Subjective:    HPI  Current seizure medications:  1  Lamotrigine 200 mg twice a day   2  Topiramate 100 mg twice a day  Other medications as per Epic  Since her last visit, she continues to be doing well  She denies any additional seizures and has been tolerating her medications well without any side effects  Overall, she denies any problems or issues today  Prior Seizure Medications: Phenytoin, Zonisamide, Oxcarbazepine, carbamazepine, pregabalin  Neurontin sounds familiar       Objective:    Blood pressure 124/80, height 5' 8\" (1 727 m), weight 77 6 kg (171 lb)  Physical Exam    Neurological Exam      ROS:    Review of Systems   Constitutional: Negative  Negative for appetite change and fever  HENT: Negative  Negative for hearing loss, tinnitus, trouble swallowing and voice change  Eyes: Negative  Negative for photophobia, pain and visual disturbance     Respiratory: " Negative  Negative for shortness of breath  Cardiovascular: Negative  Negative for palpitations  Gastrointestinal: Negative  Negative for nausea and vomiting  Endocrine: Negative  Negative for cold intolerance  Genitourinary: Negative  Negative for dysuria, frequency and urgency  Musculoskeletal: Negative  Negative for gait problem, myalgias and neck pain  Skin: Negative  Negative for rash  Allergic/Immunologic: Negative  Neurological: Negative  Negative for dizziness, tremors, seizures, syncope, facial asymmetry, speech difficulty, weakness, light-headedness, numbness and headaches  Hematological: Negative  Does not bruise/bleed easily  Psychiatric/Behavioral: Negative  Negative for confusion, hallucinations and sleep disturbance  All other systems reviewed and are negative  I personally reviewed the ROS that was entered by the medical assistant    Voice recognition software was used in the generation of this note  There may be unintentional errors including grammatical errors, spelling errors, or pronoun errors

## 2023-05-16 NOTE — PATIENT INSTRUCTIONS
-- continue to take Lamotrigine and Topiramate unchanged  --Please get levels of your medications checked when you next get blood work  Ideally, the blood work would be drawn before your take your morning medications       -- Please call our office if any problems

## 2023-05-22 NOTE — ASSESSMENT & PLAN NOTE
She continues to be seizure-free on lamotrigine and topiramate without any significant side effects  At this point, I would continue to recommend that she stay on seizure medications given her difficult to control seizures and seizures even following her epilepsy surgery in the past     --She will continue lamotrigine and topiramate unchanged  If she would have additional seizures, one of her medications could be increased      --I will have her get a lamotrigine level and a topiramate level checked when she next gets blood work

## 2023-09-12 DIAGNOSIS — G40.019 INTRACTABLE LOCALIZATION-RELATED EPILEPSY (HCC): Primary | ICD-10-CM

## 2023-09-12 RX ORDER — LAMOTRIGINE 200 MG/1
TABLET ORAL
Qty: 14 TABLET | Refills: 0 | Status: SHIPPED | OUTPATIENT
Start: 2023-09-12

## 2023-09-12 RX ORDER — TOPIRAMATE 100 MG/1
100 TABLET, FILM COATED ORAL 2 TIMES DAILY
Qty: 14 TABLET | Refills: 0 | Status: SHIPPED | OUTPATIENT
Start: 2023-09-12

## 2023-09-12 NOTE — TELEPHONE ENCOUNTER
Patient left voicemail requesting 7 day supply of medication. Mail order script will not arrive in time. [facility] [address]    Call returned to patient, [phone]. No answer. Message left acknowledging voicemail received and will be forwarded to provider.

## 2023-12-26 ENCOUNTER — HOSPITAL ENCOUNTER (EMERGENCY)
Facility: HOSPITAL | Age: 58
Discharge: HOME | End: 2023-12-26
Attending: EMERGENCY MEDICINE
Payer: COMMERCIAL

## 2023-12-26 ENCOUNTER — APPOINTMENT (EMERGENCY)
Dept: CT IMAGING | Facility: HOSPITAL | Age: 58
End: 2023-12-26
Payer: COMMERCIAL

## 2023-12-26 VITALS
OXYGEN SATURATION: 95 % | HEART RATE: 82 BPM | DIASTOLIC BLOOD PRESSURE: 80 MMHG | RESPIRATION RATE: 22 BRPM | TEMPERATURE: 97.3 F | SYSTOLIC BLOOD PRESSURE: 141 MMHG

## 2023-12-26 DIAGNOSIS — R10.9 ABDOMINAL PAIN: ICD-10-CM

## 2023-12-26 DIAGNOSIS — N39.0 UTI (URINARY TRACT INFECTION): Primary | ICD-10-CM

## 2023-12-26 LAB
ALBUMIN SERPL BCP-MCNC: 3.8 G/DL (ref 3.5–5)
ALP SERPL-CCNC: 135 U/L (ref 34–104)
ALT SERPL W P-5'-P-CCNC: 18 U/L (ref 7–52)
ANION GAP SERPL CALCULATED.3IONS-SCNC: 5 MMOL/L
AST SERPL W P-5'-P-CCNC: 23 U/L (ref 13–39)
BACTERIA UR QL AUTO: ABNORMAL /HPF
BASOPHILS # BLD AUTO: 0.04 THOUSANDS/ÂΜL (ref 0–0.1)
BASOPHILS NFR BLD AUTO: 1 % (ref 0–1)
BILIRUB SERPL-MCNC: 0.31 MG/DL (ref 0.2–1)
BILIRUB UR QL STRIP: NEGATIVE
BUN SERPL-MCNC: 16 MG/DL (ref 5–25)
CALCIUM SERPL-MCNC: 9 MG/DL (ref 8.4–10.2)
CARDIAC TROPONIN I PNL SERPL HS: 3 NG/L
CHLORIDE SERPL-SCNC: 115 MMOL/L (ref 96–108)
CLARITY UR: CLEAR
CO2 SERPL-SCNC: 19 MMOL/L (ref 21–32)
COLOR UR: ABNORMAL
CREAT SERPL-MCNC: 1.19 MG/DL (ref 0.6–1.3)
EOSINOPHIL # BLD AUTO: 0.1 THOUSAND/ÂΜL (ref 0–0.61)
EOSINOPHIL NFR BLD AUTO: 2 % (ref 0–6)
ERYTHROCYTE [DISTWIDTH] IN BLOOD BY AUTOMATED COUNT: 13.7 % (ref 11.6–15.1)
GFR SERPL CREATININE-BSD FRML MDRD: 50 ML/MIN/1.73SQ M
GLUCOSE SERPL-MCNC: 95 MG/DL (ref 65–140)
GLUCOSE UR STRIP-MCNC: NEGATIVE MG/DL
HCT VFR BLD AUTO: 45.2 % (ref 34.8–46.1)
HGB BLD-MCNC: 14.4 G/DL (ref 11.5–15.4)
HGB UR QL STRIP.AUTO: NEGATIVE
IMM GRANULOCYTES # BLD AUTO: 0.04 THOUSAND/UL (ref 0–0.2)
IMM GRANULOCYTES NFR BLD AUTO: 1 % (ref 0–2)
KETONES UR STRIP-MCNC: NEGATIVE MG/DL
LEUKOCYTE ESTERASE UR QL STRIP: ABNORMAL
LIPASE SERPL-CCNC: 51 U/L (ref 11–82)
LYMPHOCYTES # BLD AUTO: 0.51 THOUSANDS/ÂΜL (ref 0.6–4.47)
LYMPHOCYTES NFR BLD AUTO: 9 % (ref 14–44)
MCH RBC QN AUTO: 30 PG (ref 26.8–34.3)
MCHC RBC AUTO-ENTMCNC: 31.9 G/DL (ref 31.4–37.4)
MCV RBC AUTO: 94 FL (ref 82–98)
MONOCYTES # BLD AUTO: 0.72 THOUSAND/ÂΜL (ref 0.17–1.22)
MONOCYTES NFR BLD AUTO: 13 % (ref 4–12)
NEUTROPHILS # BLD AUTO: 4.1 THOUSANDS/ÂΜL (ref 1.85–7.62)
NEUTS SEG NFR BLD AUTO: 74 % (ref 43–75)
NITRITE UR QL STRIP: NEGATIVE
NON-SQ EPI CELLS URNS QL MICRO: ABNORMAL /HPF
NRBC BLD AUTO-RTO: 0 /100 WBCS
PH UR STRIP.AUTO: 6.5 [PH]
PLATELET # BLD AUTO: 294 THOUSANDS/UL (ref 149–390)
PMV BLD AUTO: 10.3 FL (ref 8.9–12.7)
POTASSIUM SERPL-SCNC: 4.2 MMOL/L (ref 3.5–5.3)
PROT SERPL-MCNC: 6.3 G/DL (ref 6.4–8.4)
PROT UR STRIP-MCNC: ABNORMAL MG/DL
RBC # BLD AUTO: 4.8 MILLION/UL (ref 3.81–5.12)
RBC #/AREA URNS AUTO: ABNORMAL /HPF
SODIUM SERPL-SCNC: 139 MMOL/L (ref 135–147)
SP GR UR STRIP.AUTO: 1.01 (ref 1–1.03)
UROBILINOGEN UR STRIP-ACNC: <2 MG/DL
WBC # BLD AUTO: 5.51 THOUSAND/UL (ref 4.31–10.16)
WBC #/AREA URNS AUTO: ABNORMAL /HPF

## 2023-12-26 PROCEDURE — 80053 COMPREHEN METABOLIC PANEL: CPT | Performed by: EMERGENCY MEDICINE

## 2023-12-26 PROCEDURE — 74177 CT ABD & PELVIS W/CONTRAST: CPT

## 2023-12-26 PROCEDURE — 83690 ASSAY OF LIPASE: CPT | Performed by: EMERGENCY MEDICINE

## 2023-12-26 PROCEDURE — 81001 URINALYSIS AUTO W/SCOPE: CPT | Performed by: EMERGENCY MEDICINE

## 2023-12-26 PROCEDURE — 96360 HYDRATION IV INFUSION INIT: CPT

## 2023-12-26 PROCEDURE — 99285 EMERGENCY DEPT VISIT HI MDM: CPT | Performed by: EMERGENCY MEDICINE

## 2023-12-26 PROCEDURE — 36415 COLL VENOUS BLD VENIPUNCTURE: CPT | Performed by: EMERGENCY MEDICINE

## 2023-12-26 PROCEDURE — 99284 EMERGENCY DEPT VISIT MOD MDM: CPT

## 2023-12-26 PROCEDURE — 85025 COMPLETE CBC W/AUTO DIFF WBC: CPT | Performed by: EMERGENCY MEDICINE

## 2023-12-26 PROCEDURE — 87086 URINE CULTURE/COLONY COUNT: CPT | Performed by: EMERGENCY MEDICINE

## 2023-12-26 PROCEDURE — 84484 ASSAY OF TROPONIN QUANT: CPT | Performed by: EMERGENCY MEDICINE

## 2023-12-26 PROCEDURE — 93005 ELECTROCARDIOGRAM TRACING: CPT

## 2023-12-26 RX ORDER — CEPHALEXIN 250 MG/1
500 CAPSULE ORAL ONCE
Status: COMPLETED | OUTPATIENT
Start: 2023-12-26 | End: 2023-12-26

## 2023-12-26 RX ORDER — CEPHALEXIN 250 MG/1
500 CAPSULE ORAL 4 TIMES DAILY
Qty: 56 CAPSULE | Refills: 0 | Status: SHIPPED | OUTPATIENT
Start: 2023-12-26 | End: 2024-01-02

## 2023-12-26 RX ADMIN — IOHEXOL 100 ML: 350 INJECTION, SOLUTION INTRAVENOUS at 13:11

## 2023-12-26 RX ADMIN — SODIUM CHLORIDE 1000 ML: 0.9 INJECTION, SOLUTION INTRAVENOUS at 11:39

## 2023-12-26 RX ADMIN — CEPHALEXIN 500 MG: 250 CAPSULE ORAL at 15:05

## 2023-12-26 NOTE — ED PROVIDER NOTES
History  Chief Complaint   Patient presents with    Abdominal Pain     Pt reports LUQ pain, increased pain with palpation since Friday. Denies vomiting or diarrhea, slight nausea. Denies CP/SOB or radiating pain     HPI  59 yo F presents with LUQ/LLQ abdominal pain that started four days ago. +Mild nausea. Denies dysuria or hematuria. Has history of kidney stones.   Prior to Admission Medications   Prescriptions Last Dose Informant Patient Reported? Taking?   QUEtiapine (SEROquel) 50 mg tablet  Self Yes No   Sig: Take 50 mg by mouth daily at bedtime    Patient not taking: Reported on 2022   escitalopram (LEXAPRO) 10 mg tablet   Yes No   ibuprofen (MOTRIN) 600 mg tablet   No No   Sig: Take 1 tablet (600 mg total) by mouth every 6 (six) hours as needed for mild pain for up to 10 days   Patient not taking: Reported on 2023   lamoTRIgine (LaMICtal) 200 MG tablet   No No   Sig: Take 1 tablet (200 mg total) by mouth 2 (two) times a day   lamoTRIgine (LaMICtal) 200 MG tablet   No No   Si tablet by mouth twice per day.   medroxyPROGESTERone acetate (DEPO-PROVERA SYRINGE) 150 mg/mL injection  Self Yes No   Sig: Inject 150 mg into a muscle every 3 (three) months    ondansetron (ZOFRAN-ODT) 4 mg disintegrating tablet  Self No No   Sig: Take 1 tablet (4 mg total) by mouth every 8 (eight) hours as needed for nausea or vomiting   Patient not taking: Reported on 2020   tamsulosin (FLOMAX) 0.4 mg  Self No No   Sig: Take 1 capsule (0.4 mg total) by mouth daily with dinner   Patient not taking: Reported on 2020   topiramate (TOPAMAX) 100 mg tablet   No No   Sig: Take 1 tablet (100 mg total) by mouth 2 (two) times a day   topiramate (TOPAMAX) 100 mg tablet   No No   Sig: Take 1 tablet (100 mg total) by mouth 2 (two) times a day      Facility-Administered Medications: None       Past Medical History:   Diagnosis Date    Hx of hypercalcemia     Lymphoma (HCC) 2021    Parathyroid disease (HCC)     Seizures  (HCC)     Situational depression     24 yr old son  from drug overdose       Past Surgical History:   Procedure Laterality Date    CRANIOTOMY FOR TEMPORAL LOBECTOMY Left        Family History   Problem Relation Age of Onset    Diabetes Mother     Cancer Father      I have reviewed and agree with the history as documented.    E-Cigarette/Vaping    E-Cigarette Use Never User      E-Cigarette/Vaping Substances    Nicotine No     THC No     CBD No      Social History     Tobacco Use    Smoking status: Never    Smokeless tobacco: Never   Vaping Use    Vaping status: Never Used   Substance Use Topics    Alcohol use: No    Drug use: Never       Review of Systems   Constitutional:  Negative for chills and fever.   HENT:  Negative for dental problem and ear pain.    Eyes:  Negative for pain and redness.   Respiratory:  Negative for cough and shortness of breath.    Cardiovascular:  Negative for chest pain and palpitations.   Gastrointestinal:  Positive for abdominal pain and nausea.   Endocrine: Negative for polydipsia and polyphagia.   Genitourinary:  Negative for dysuria and frequency.   Musculoskeletal:  Negative for arthralgias and joint swelling.   Skin:  Negative for color change and rash.   Neurological:  Negative for dizziness and headaches.   Psychiatric/Behavioral:  Negative for behavioral problems and confusion.    All other systems reviewed and are negative.      Physical Exam  Physical Exam  Vitals and nursing note reviewed.   Constitutional:       General: She is not in acute distress.     Appearance: She is well-developed. She is not diaphoretic.   HENT:      Head: Atraumatic.      Right Ear: External ear normal.      Left Ear: External ear normal.      Nose: Nose normal.   Eyes:      Conjunctiva/sclera: Conjunctivae normal.      Pupils: Pupils are equal, round, and reactive to light.   Neck:      Vascular: No JVD.   Cardiovascular:      Rate and Rhythm: Normal rate and regular rhythm.      Heart  sounds: Normal heart sounds. No murmur heard.  Pulmonary:      Effort: Pulmonary effort is normal. No respiratory distress.      Breath sounds: Normal breath sounds. No wheezing.   Abdominal:      General: Bowel sounds are normal. There is no distension.      Palpations: Abdomen is soft.      Tenderness: There is abdominal tenderness in the left upper quadrant and left lower quadrant.   Musculoskeletal:         General: Normal range of motion.      Cervical back: Normal range of motion and neck supple.   Skin:     General: Skin is warm and dry.      Capillary Refill: Capillary refill takes less than 2 seconds.   Neurological:      Mental Status: She is alert and oriented to person, place, and time.      Cranial Nerves: No cranial nerve deficit.   Psychiatric:         Behavior: Behavior normal.         Vital Signs  ED Triage Vitals [12/26/23 1032]   Temperature Pulse Respirations Blood Pressure SpO2   (!) 97.3 °F (36.3 °C) (!) 107 18 128/70 97 %      Temp Source Heart Rate Source Patient Position - Orthostatic VS BP Location FiO2 (%)   Temporal Monitor Sitting Left arm --      Pain Score       --           Vitals:    12/26/23 1032 12/26/23 1145 12/26/23 1400   BP: 128/70 111/75 141/80   Pulse: (!) 107 88 82   Patient Position - Orthostatic VS: Sitting Lying Lying         Visual Acuity      ED Medications  Medications   sodium chloride 0.9 % bolus 1,000 mL (0 mL Intravenous Stopped 12/26/23 1239)   iohexol (OMNIPAQUE) 350 MG/ML injection (MULTI-DOSE) 100 mL (100 mL Intravenous Given 12/26/23 1311)   cephalexin (KEFLEX) capsule 500 mg (500 mg Oral Given 12/26/23 1505)       Diagnostic Studies  Results Reviewed       Procedure Component Value Units Date/Time    Urine Microscopic [[id number]]  (Abnormal) Collected: 12/26/23 1339    Lab Status: Final result Specimen: Urine, Clean Catch Updated: 12/26/23 1352     RBC, UA 2-4 /hpf      WBC, UA 20-30 /hpf      Epithelial Cells Occasional /hpf      Bacteria, UA Occasional /hpf      Urine culture [[id number]] Collected: 12/26/23 1339    Lab Status: In process Specimen: Urine, Clean Catch Updated: 12/26/23 1352    UA w Reflex to Microscopic w Reflex to Culture [[id number]]  (Abnormal) Collected: 12/26/23 1339    Lab Status: Final result Specimen: Urine, Clean Catch Updated: 12/26/23 1351     Color, UA Light Yellow     Clarity, UA Clear     Specific Gravity, UA 1.012     pH, UA 6.5     Leukocytes, UA Moderate     Nitrite, UA Negative     Protein, UA Trace mg/dl      Glucose, UA Negative mg/dl      Ketones, UA Negative mg/dl      Urobilinogen, UA <2.0 mg/dl      Bilirubin, UA Negative     Occult Blood, UA Negative    Comprehensive metabolic panel [[id number]]  (Abnormal) Collected: 12/26/23 1238    Lab Status: Final result Specimen: Blood from Arm, Right Updated: 12/26/23 1302     Sodium 139 mmol/L      Potassium 4.2 mmol/L      Chloride 115 mmol/L      CO2 19 mmol/L      ANION GAP 5 mmol/L      BUN 16 mg/dL      Creatinine 1.19 mg/dL      Glucose 95 mg/dL      Calcium 9.0 mg/dL      AST 23 U/L      ALT 18 U/L      Alkaline Phosphatase 135 U/L      Total Protein 6.3 g/dL      Albumin 3.8 g/dL      Total Bilirubin 0.31 mg/dL      eGFR 50 ml/min/1.73sq m     Narrative:      National Kidney Disease Foundation guidelines for Chronic Kidney Disease (CKD):     Stage 1 with normal or high GFR (GFR > 90 mL/min/1.73 square meters)    Stage 2 Mild CKD (GFR = 60-89 mL/min/1.73 square meters)    Stage 3A Moderate CKD (GFR = 45-59 mL/min/1.73 square meters)    Stage 3B Moderate CKD (GFR = 30-44 mL/min/1.73 square meters)    Stage 4 Severe CKD (GFR = 15-29 mL/min/1.73 square meters)    Stage 5 End Stage CKD (GFR <15 mL/min/1.73 square meters)  Note: GFR calculation is accurate only with a steady state creatinine    Lipase [[id number]]  (Normal) Collected: 12/26/23 1238    Lab Status: Final result Specimen: Blood from Arm, Right Updated: 12/26/23 1302     Lipase 51 u/L     HS Troponin 0hr (reflex protocol)  [[id number]]  (Normal) Collected: 12/26/23 1139    Lab Status: Final result Specimen: Blood from Arm, Left Updated: 12/26/23 1211     hs TnI 0hr 3 ng/L     CBC and differential [[id number]]  (Abnormal) Collected: 12/26/23 1139    Lab Status: Final result Specimen: Blood from Arm, Left Updated: 12/26/23 1143     WBC 5.51 Thousand/uL      RBC 4.80 Million/uL      Hemoglobin 14.4 g/dL      Hematocrit 45.2 %      MCV 94 fL      MCH 30.0 pg      MCHC 31.9 g/dL      RDW 13.7 %      MPV 10.3 fL      Platelets 294 Thousands/uL      nRBC 0 /100 WBCs      Neutrophils Relative 74 %      Immat GRANS % 1 %      Lymphocytes Relative 9 %      Monocytes Relative 13 %      Eosinophils Relative 2 %      Basophils Relative 1 %      Neutrophils Absolute 4.10 Thousands/µL      Immature Grans Absolute 0.04 Thousand/uL      Lymphocytes Absolute 0.51 Thousands/µL      Monocytes Absolute 0.72 Thousand/µL      Eosinophils Absolute 0.10 Thousand/µL      Basophils Absolute 0.04 Thousands/µL                    CT abdomen pelvis with contrast   Final Result by Nawaf Montoya MD (12/26 1456)      Bilateral nonobstructing intrarenal calculi with evidence of medullary sponge kidney and multiple bilateral cysts unchanged. No hydroureteronephrosis.      Mild bladder wall thickening. Correlate for cystitis.      Enlarged fatty liver.            Workstation performed: [id number]                    Procedures  ECG 12 Lead Documentation Only    Date/Time: 12/26/2023 4:30 PM    Performed by: Rachelle Guzman MD  Authorized by: Rachelle Guzman MD    Comments:      NSR rate of 91 normal axis and intervals no acute ST elevations or depression           ED Course                               SBIRT 20yo+      Flowsheet Row Most Recent Value   Initial Alcohol Screen: US AUDIT-C     1. How often do you have a drink containing alcohol? 0 Filed at: 12/26/2023 1521   2. How many drinks containing alcohol do you have on a typical day you are drinking?  0 Filed at:  12/26/2023 1521   3b. FEMALE Any Age, or MALE 65+: How often do you have 4 or more drinks on one occassion? 0 Filed at: 12/26/2023 1521   Audit-C Score 0 Filed at: 12/26/2023 1521   ALONSO: How many times in the past year have you...    Used an illegal drug or used a prescription medication for non-medical reasons? Never Filed at: 12/26/2023 1521                      Medical Decision Making  Amount and/or Complexity of Data Reviewed  Labs: ordered.  Radiology: ordered.    Risk  Prescription drug management.           Patient presents with abdominal pain. CT shows cystitis and UA consistent with UTI, will treat. Non-obstructing kidney stones. Patient appears well, nontoxic  Disposition  Final diagnoses:   UTI (urinary tract infection)   Abdominal pain     Time reflects when diagnosis was documented in both MDM as applicable and the Disposition within this note       Time User Action Codes Description Comment    12/26/2023  3:05 PM Rachelle Guzman [N39.0] UTI (urinary tract infection)     12/26/2023  4:28 PM Rachelle Guzman [R10.9] Abdominal pain           ED Disposition       ED Disposition   Discharge    Condition   Stable    Date/Time   Tue Dec 26, 2023 1505    Comment   Carla Hunt discharge to home/self care.                   Follow-up Information       Follow up With Specialties Details Why Contact Info    Tony Guevara MD Family Medicine Call   [address] [place] [id number] [phone]              Discharge Medication List as of 12/26/2023  3:05 PM        START taking these medications    Details   cephalexin (KEFLEX) 250 mg capsule Take 2 capsules (500 mg total) by mouth 4 (four) times a day for 7 days, Starting Tue 12/26/2023, Until Tue 1/2/2024, Normal           CONTINUE these medications which have NOT CHANGED    Details   escitalopram (LEXAPRO) 10 mg tablet Starting Mon 5/10/2021, Historical Med      ibuprofen (MOTRIN) 600 mg tablet Take 1 tablet (600 mg total) by mouth every  6 (six) hours as needed for mild pain for up to 10 days, Starting Wed 10/21/2020, Until Thu 5/26/2022 at 2359, Print      !! lamoTRIgine (LaMICtal) 200 MG tablet Take 1 tablet (200 mg total) by mouth 2 (two) times a day, Starting Tue 5/16/2023, Normal      !! lamoTRIgine (LaMICtal) 200 MG tablet 1 tablet by mouth twice per day., Normal      medroxyPROGESTERone acetate (DEPO-PROVERA SYRINGE) 150 mg/mL injection Inject 150 mg into a muscle every 3 (three) months , Starting Tue 3/26/2019, Historical Med      ondansetron (ZOFRAN-ODT) 4 mg disintegrating tablet Take 1 tablet (4 mg total) by mouth every 8 (eight) hours as needed for nausea or vomiting, Starting Wed 10/21/2020, Until Fri 11/20/2020, Print      QUEtiapine (SEROquel) 50 mg tablet Take 50 mg by mouth daily at bedtime , Starting Tue 2/19/2019, Historical Med      tamsulosin (FLOMAX) 0.4 mg Take 1 capsule (0.4 mg total) by mouth daily with dinner, Starting Wed 10/21/2020, Print      !! topiramate (TOPAMAX) 100 mg tablet Take 1 tablet (100 mg total) by mouth 2 (two) times a day, Starting Tue 5/16/2023, Normal      !! topiramate (TOPAMAX) 100 mg tablet Take 1 tablet (100 mg total) by mouth 2 (two) times a day, Starting Tue 9/12/2023, Normal       !! - Potential duplicate medications found. Please discuss with provider.          No discharge procedures on file.    PDMP Review         Value Time User    PDMP Reviewed  Yes 10/21/2020  8:08 PM Mallory Floyd MD            ED Provider  Electronically Signed by             Rachelle Guzman MD  12/26/23 6140       Rachelle Guzman MD  12/26/23 5147

## 2023-12-27 LAB
ATRIAL RATE: 91 BPM
BACTERIA UR CULT: NORMAL
P AXIS: 56 DEGREES
PR INTERVAL: 154 MS
QRS AXIS: 4 DEGREES
QRSD INTERVAL: 70 MS
QT INTERVAL: 346 MS
QTC INTERVAL: 425 MS
T WAVE AXIS: 47 DEGREES
VENTRICULAR RATE: 91 BPM

## 2024-01-01 ENCOUNTER — APPOINTMENT (INPATIENT)
Dept: GASTROENTEROLOGY | Facility: HOSPITAL | Age: 59
DRG: 003 | End: 2024-01-01
Payer: COMMERCIAL

## 2024-01-01 ENCOUNTER — APPOINTMENT (INPATIENT)
Dept: RADIOLOGY | Facility: HOSPITAL | Age: 59
DRG: 003 | End: 2024-01-01
Payer: COMMERCIAL

## 2024-01-01 ENCOUNTER — APPOINTMENT (INPATIENT)
Dept: NON INVASIVE DIAGNOSTICS | Facility: HOSPITAL | Age: 59
DRG: 003 | End: 2024-01-01
Payer: COMMERCIAL

## 2024-01-01 ENCOUNTER — APPOINTMENT (INPATIENT)
Dept: GASTROENTEROLOGY | Facility: HOSPITAL | Age: 59
DRG: 003 | End: 2024-01-01
Attending: INTERNAL MEDICINE
Payer: COMMERCIAL

## 2024-01-01 ENCOUNTER — APPOINTMENT (OUTPATIENT)
Dept: RADIOLOGY | Facility: HOSPITAL | Age: 59
DRG: 003 | End: 2024-01-01
Payer: COMMERCIAL

## 2024-01-01 ENCOUNTER — ANESTHESIA (INPATIENT)
Dept: RADIOLOGY | Facility: HOSPITAL | Age: 59
DRG: 003 | End: 2024-01-01
Payer: COMMERCIAL

## 2024-01-01 ENCOUNTER — ANESTHESIA EVENT (INPATIENT)
Dept: RADIOLOGY | Facility: HOSPITAL | Age: 59
DRG: 003 | End: 2024-01-01
Payer: COMMERCIAL

## 2024-01-01 ENCOUNTER — APPOINTMENT (INPATIENT)
Dept: RADIOLOGY | Facility: HOSPITAL | Age: 59
DRG: 003 | End: 2024-01-01
Attending: EMERGENCY MEDICINE
Payer: COMMERCIAL

## 2024-01-01 PROCEDURE — 71045 X-RAY EXAM CHEST 1 VIEW: CPT

## 2024-01-01 PROCEDURE — 93321 DOPPLER ECHO F-UP/LMTD STD: CPT

## 2024-01-01 PROCEDURE — 93325 DOPPLER ECHO COLOR FLOW MAPG: CPT

## 2024-01-01 PROCEDURE — C1760 CLOSURE DEV, VASC: HCPCS

## 2024-01-01 PROCEDURE — 74178 CT ABD&PLV WO CNTR FLWD CNTR: CPT

## 2024-01-01 PROCEDURE — 70450 CT HEAD/BRAIN W/O DYE: CPT

## 2024-01-01 PROCEDURE — C1769 GUIDE WIRE: HCPCS

## 2024-01-01 PROCEDURE — C1880 VENA CAVA FILTER: HCPCS

## 2024-01-01 PROCEDURE — 76937 US GUIDE VASCULAR ACCESS: CPT

## 2024-01-01 PROCEDURE — C1894 INTRO/SHEATH, NON-LASER: HCPCS

## 2024-01-01 PROCEDURE — 93325 DOPPLER ECHO COLOR FLOW MAPG: CPT | Performed by: INTERNAL MEDICINE

## 2024-01-01 PROCEDURE — 31622 DX BRONCHOSCOPE/WASH: CPT | Performed by: INTERNAL MEDICINE

## 2024-01-01 PROCEDURE — 93321 DOPPLER ECHO F-UP/LMTD STD: CPT | Performed by: INTERNAL MEDICINE

## 2024-01-01 PROCEDURE — 36556 INSERT NON-TUNNEL CV CATH: CPT

## 2024-01-01 PROCEDURE — 70551 MRI BRAIN STEM W/O DYE: CPT

## 2024-01-01 PROCEDURE — 74177 CT ABD & PELVIS W/CONTRAST: CPT

## 2024-01-01 PROCEDURE — 71260 CT THORAX DX C+: CPT

## 2024-01-01 PROCEDURE — 77001 FLUOROGUIDE FOR VEIN DEVICE: CPT

## 2024-01-01 PROCEDURE — 93308 TTE F-UP OR LMTD: CPT | Performed by: INTERNAL MEDICINE

## 2024-01-01 PROCEDURE — 93306 TTE W/DOPPLER COMPLETE: CPT

## 2024-01-01 PROCEDURE — 93306 TTE W/DOPPLER COMPLETE: CPT | Performed by: INTERNAL MEDICINE

## 2024-01-01 PROCEDURE — 77012 CT SCAN FOR NEEDLE BIOPSY: CPT

## 2024-01-01 PROCEDURE — 93970 EXTREMITY STUDY: CPT

## 2024-01-01 PROCEDURE — 76705 ECHO EXAM OF ABDOMEN: CPT

## 2024-01-01 PROCEDURE — 71250 CT THORAX DX C-: CPT

## 2024-01-01 PROCEDURE — 74018 RADEX ABDOMEN 1 VIEW: CPT

## 2024-01-01 PROCEDURE — NC001 PR NO CHARGE: Performed by: INTERNAL MEDICINE

## 2024-01-01 PROCEDURE — 93308 TTE F-UP OR LMTD: CPT

## 2024-01-01 PROCEDURE — C1729 CATH, DRAINAGE: HCPCS

## 2024-01-01 PROCEDURE — 76536 US EXAM OF HEAD AND NECK: CPT

## 2024-01-01 PROCEDURE — C1887 CATHETER, GUIDING: HCPCS

## 2024-01-01 PROCEDURE — 74176 CT ABD & PELVIS W/O CONTRAST: CPT

## 2024-01-01 PROCEDURE — 36247 INS CATH ABD/L-EXT ART 3RD: CPT

## 2024-01-01 PROCEDURE — 37191 INS ENDOVAS VENA CAVA FILTR: CPT

## 2024-01-01 PROCEDURE — 37244 VASC EMBOLIZE/OCCLUDE BLEED: CPT

## 2024-01-01 PROCEDURE — 74230 X-RAY XM SWLNG FUNCJ C+: CPT

## 2024-01-01 PROCEDURE — 36248 INS CATH ABD/L-EXT ART ADDL: CPT

## 2024-01-01 PROCEDURE — 70496 CT ANGIOGRAPHY HEAD: CPT

## 2024-01-01 PROCEDURE — 93312 ECHO TRANSESOPHAGEAL: CPT

## 2024-01-01 PROCEDURE — 93971 EXTREMITY STUDY: CPT

## 2024-01-01 PROCEDURE — 62328 DX LMBR SPI PNXR W/FLUOR/CT: CPT

## 2024-01-01 PROCEDURE — C1751 CATH, INF, PER/CENT/MIDLINE: HCPCS

## 2024-01-01 RX ORDER — SODIUM CHLORIDE 9 MG/ML
INJECTION, SOLUTION INTRAVENOUS CONTINUOUS PRN
Status: DISCONTINUED | OUTPATIENT
Start: 2024-01-01 | End: 2024-01-01

## 2024-01-01 RX ORDER — MIDAZOLAM HYDROCHLORIDE 2 MG/2ML
INJECTION, SOLUTION INTRAMUSCULAR; INTRAVENOUS AS NEEDED
Status: DISCONTINUED | OUTPATIENT
Start: 2024-01-01 | End: 2024-01-01

## 2024-01-01 RX ORDER — VASOPRESSIN 20 U/ML
INJECTION PARENTERAL AS NEEDED
Status: DISCONTINUED | OUTPATIENT
Start: 2024-01-01 | End: 2024-01-01

## 2024-01-01 RX ORDER — KETAMINE HCL IN NACL, ISO-OSM 100MG/10ML
SYRINGE (ML) INJECTION AS NEEDED
Status: DISCONTINUED | OUTPATIENT
Start: 2024-01-01 | End: 2024-01-01

## 2024-01-01 RX ORDER — ROCURONIUM BROMIDE 10 MG/ML
INJECTION, SOLUTION INTRAVENOUS AS NEEDED
Status: DISCONTINUED | OUTPATIENT
Start: 2024-01-01 | End: 2024-01-01

## 2024-01-01 RX ORDER — CALCIUM CHLORIDE 100 MG/ML
INJECTION INTRAVENOUS; INTRAVENTRICULAR AS NEEDED
Status: DISCONTINUED | OUTPATIENT
Start: 2024-01-01 | End: 2024-01-01

## 2024-01-01 RX ADMIN — SODIUM BICARBONATE 50 MEQ: 84 INJECTION, SOLUTION INTRAVENOUS at 20:47

## 2024-01-01 RX ADMIN — Medication 20 MG: at 20:12

## 2024-01-01 RX ADMIN — SODIUM CHLORIDE: 0.9 INJECTION, SOLUTION INTRAVENOUS at 20:10

## 2024-01-01 RX ADMIN — SODIUM BICARBONATE 50 MEQ: 84 INJECTION, SOLUTION INTRAVENOUS at 21:29

## 2024-01-01 RX ADMIN — SUGAMMADEX 200 MG: 100 INJECTION, SOLUTION INTRAVENOUS at 22:35

## 2024-01-01 RX ADMIN — Medication 10 MG: at 21:40

## 2024-01-01 RX ADMIN — CALCIUM CHLORIDE 0.5 G: 100 INJECTION INTRAVENOUS; INTRAVENTRICULAR at 20:22

## 2024-01-01 RX ADMIN — ROCURONIUM BROMIDE 50 MG: 10 INJECTION, SOLUTION INTRAVENOUS at 20:19

## 2024-01-01 RX ADMIN — SODIUM BICARBONATE 50 MEQ: 84 INJECTION, SOLUTION INTRAVENOUS at 20:43

## 2024-01-01 RX ADMIN — Medication 10 MG: at 21:21

## 2024-01-01 RX ADMIN — Medication 10 MG: at 22:20

## 2024-01-01 RX ADMIN — VASOPRESSIN 2 UNITS: 20 INJECTION INTRAVENOUS at 20:23

## 2024-01-01 RX ADMIN — MIDAZOLAM 2 MG: 1 INJECTION INTRAMUSCULAR; INTRAVENOUS at 20:11

## 2024-01-07 ENCOUNTER — APPOINTMENT (EMERGENCY)
Dept: RADIOLOGY | Facility: HOSPITAL | Age: 59
DRG: 871 | End: 2024-01-07
Payer: COMMERCIAL

## 2024-01-07 ENCOUNTER — APPOINTMENT (EMERGENCY)
Dept: CT IMAGING | Facility: HOSPITAL | Age: 59
DRG: 871 | End: 2024-01-07
Payer: COMMERCIAL

## 2024-01-07 ENCOUNTER — HOSPITAL ENCOUNTER (INPATIENT)
Facility: HOSPITAL | Age: 59
LOS: 2 days | DRG: 871 | End: 2024-01-10
Attending: EMERGENCY MEDICINE | Admitting: INTERNAL MEDICINE
Payer: COMMERCIAL

## 2024-01-07 DIAGNOSIS — U07.1 COVID-19: ICD-10-CM

## 2024-01-07 DIAGNOSIS — A41.9 SEPSIS (HCC): ICD-10-CM

## 2024-01-07 DIAGNOSIS — R93.0 ABNORMAL CT OF THE HEAD: Primary | ICD-10-CM

## 2024-01-07 LAB
ALBUMIN SERPL BCP-MCNC: 3.7 G/DL (ref 3.5–5)
ALP SERPL-CCNC: 123 U/L (ref 34–104)
ALT SERPL W P-5'-P-CCNC: 92 U/L (ref 7–52)
ANION GAP SERPL CALCULATED.3IONS-SCNC: 9 MMOL/L
APTT PPP: 38 SECONDS (ref 23–37)
AST SERPL W P-5'-P-CCNC: 141 U/L (ref 13–39)
BACTERIA UR QL AUTO: ABNORMAL /HPF
BASOPHILS # BLD AUTO: 0.01 THOUSANDS/ÂΜL (ref 0–0.1)
BASOPHILS NFR BLD AUTO: 0 % (ref 0–1)
BILIRUB DIRECT SERPL-MCNC: 0.23 MG/DL (ref 0–0.2)
BILIRUB SERPL-MCNC: 0.6 MG/DL (ref 0.2–1)
BILIRUB UR QL STRIP: NEGATIVE
BUN SERPL-MCNC: 22 MG/DL (ref 5–25)
CALCIUM SERPL-MCNC: 9.3 MG/DL (ref 8.4–10.2)
CARDIAC TROPONIN I PNL SERPL HS: 12 NG/L
CHLORIDE SERPL-SCNC: 107 MMOL/L (ref 96–108)
CLARITY UR: CLEAR
CO2 SERPL-SCNC: 20 MMOL/L (ref 21–32)
COLOR UR: ABNORMAL
CREAT SERPL-MCNC: 1.5 MG/DL (ref 0.6–1.3)
EOSINOPHIL # BLD AUTO: 0.01 THOUSAND/ÂΜL (ref 0–0.61)
EOSINOPHIL NFR BLD AUTO: 0 % (ref 0–6)
ERYTHROCYTE [DISTWIDTH] IN BLOOD BY AUTOMATED COUNT: 13.3 % (ref 11.6–15.1)
FLUAV RNA RESP QL NAA+PROBE: NEGATIVE
FLUBV RNA RESP QL NAA+PROBE: NEGATIVE
GFR SERPL CREATININE-BSD FRML MDRD: 38 ML/MIN/1.73SQ M
GLUCOSE SERPL-MCNC: 131 MG/DL (ref 65–140)
GLUCOSE SERPL-MCNC: 138 MG/DL (ref 65–140)
GLUCOSE UR STRIP-MCNC: NEGATIVE MG/DL
HCT VFR BLD AUTO: 40.7 % (ref 34.8–46.1)
HGB BLD-MCNC: 13.5 G/DL (ref 11.5–15.4)
HGB UR QL STRIP.AUTO: ABNORMAL
IMM GRANULOCYTES # BLD AUTO: 0.05 THOUSAND/UL (ref 0–0.2)
IMM GRANULOCYTES NFR BLD AUTO: 2 % (ref 0–2)
INR PPP: 1.07 (ref 0.84–1.19)
KETONES UR STRIP-MCNC: NEGATIVE MG/DL
LACTATE SERPL-SCNC: 1.8 MMOL/L (ref 0.5–2)
LEUKOCYTE ESTERASE UR QL STRIP: ABNORMAL
LYMPHOCYTES # BLD AUTO: 0.14 THOUSANDS/ÂΜL (ref 0.6–4.47)
LYMPHOCYTES NFR BLD AUTO: 4 % (ref 14–44)
MCH RBC QN AUTO: 29 PG (ref 26.8–34.3)
MCHC RBC AUTO-ENTMCNC: 33.2 G/DL (ref 31.4–37.4)
MCV RBC AUTO: 87 FL (ref 82–98)
MONOCYTES # BLD AUTO: 0.24 THOUSAND/ÂΜL (ref 0.17–1.22)
MONOCYTES NFR BLD AUTO: 7 % (ref 4–12)
NEUTROPHILS # BLD AUTO: 2.99 THOUSANDS/ÂΜL (ref 1.85–7.62)
NEUTS SEG NFR BLD AUTO: 87 % (ref 43–75)
NITRITE UR QL STRIP: NEGATIVE
NON-SQ EPI CELLS URNS QL MICRO: ABNORMAL /HPF
NRBC BLD AUTO-RTO: 0 /100 WBCS
PH UR STRIP.AUTO: 6.5 [PH]
PLATELET # BLD AUTO: 308 THOUSANDS/UL (ref 149–390)
PMV BLD AUTO: 9.4 FL (ref 8.9–12.7)
POTASSIUM SERPL-SCNC: 3.7 MMOL/L (ref 3.5–5.3)
PROT SERPL-MCNC: 6.5 G/DL (ref 6.4–8.4)
PROT UR STRIP-MCNC: ABNORMAL MG/DL
PROTHROMBIN TIME: 14.6 SECONDS (ref 11.6–14.5)
RBC # BLD AUTO: 4.66 MILLION/UL (ref 3.81–5.12)
RBC #/AREA URNS AUTO: ABNORMAL /HPF
RSV RNA RESP QL NAA+PROBE: NEGATIVE
SARS-COV-2 RNA RESP QL NAA+PROBE: POSITIVE
SODIUM SERPL-SCNC: 136 MMOL/L (ref 135–147)
SP GR UR STRIP.AUTO: 1.04 (ref 1–1.03)
UROBILINOGEN UR STRIP-ACNC: <2 MG/DL
WBC # BLD AUTO: 3.44 THOUSAND/UL (ref 4.31–10.16)
WBC #/AREA URNS AUTO: ABNORMAL /HPF

## 2024-01-07 PROCEDURE — 99285 EMERGENCY DEPT VISIT HI MDM: CPT

## 2024-01-07 PROCEDURE — 80048 BASIC METABOLIC PNL TOTAL CA: CPT | Performed by: EMERGENCY MEDICINE

## 2024-01-07 PROCEDURE — 85025 COMPLETE CBC W/AUTO DIFF WBC: CPT | Performed by: EMERGENCY MEDICINE

## 2024-01-07 PROCEDURE — 81001 URINALYSIS AUTO W/SCOPE: CPT | Performed by: EMERGENCY MEDICINE

## 2024-01-07 PROCEDURE — 85730 THROMBOPLASTIN TIME PARTIAL: CPT | Performed by: EMERGENCY MEDICINE

## 2024-01-07 PROCEDURE — 85610 PROTHROMBIN TIME: CPT | Performed by: EMERGENCY MEDICINE

## 2024-01-07 PROCEDURE — 87040 BLOOD CULTURE FOR BACTERIA: CPT | Performed by: EMERGENCY MEDICINE

## 2024-01-07 PROCEDURE — 83605 ASSAY OF LACTIC ACID: CPT | Performed by: EMERGENCY MEDICINE

## 2024-01-07 PROCEDURE — 70450 CT HEAD/BRAIN W/O DYE: CPT

## 2024-01-07 PROCEDURE — 71045 X-RAY EXAM CHEST 1 VIEW: CPT

## 2024-01-07 PROCEDURE — 0241U HB NFCT DS VIR RESP RNA 4 TRGT: CPT | Performed by: EMERGENCY MEDICINE

## 2024-01-07 PROCEDURE — 84484 ASSAY OF TROPONIN QUANT: CPT | Performed by: EMERGENCY MEDICINE

## 2024-01-07 PROCEDURE — 36415 COLL VENOUS BLD VENIPUNCTURE: CPT | Performed by: EMERGENCY MEDICINE

## 2024-01-07 PROCEDURE — 96360 HYDRATION IV INFUSION INIT: CPT

## 2024-01-07 PROCEDURE — 70498 CT ANGIOGRAPHY NECK: CPT

## 2024-01-07 PROCEDURE — 70496 CT ANGIOGRAPHY HEAD: CPT

## 2024-01-07 PROCEDURE — 80076 HEPATIC FUNCTION PANEL: CPT | Performed by: EMERGENCY MEDICINE

## 2024-01-07 PROCEDURE — 82948 REAGENT STRIP/BLOOD GLUCOSE: CPT

## 2024-01-07 PROCEDURE — 87086 URINE CULTURE/COLONY COUNT: CPT | Performed by: EMERGENCY MEDICINE

## 2024-01-07 PROCEDURE — 99285 EMERGENCY DEPT VISIT HI MDM: CPT | Performed by: EMERGENCY MEDICINE

## 2024-01-07 PROCEDURE — 93005 ELECTROCARDIOGRAM TRACING: CPT

## 2024-01-07 RX ADMIN — IOHEXOL 85 ML: 350 INJECTION, SOLUTION INTRAVENOUS at 21:09

## 2024-01-07 RX ADMIN — SODIUM CHLORIDE 1000 ML: 0.9 INJECTION, SOLUTION INTRAVENOUS at 19:17

## 2024-01-08 ENCOUNTER — APPOINTMENT (INPATIENT)
Dept: NEUROLOGY | Facility: HOSPITAL | Age: 59
DRG: 871 | End: 2024-01-08
Payer: COMMERCIAL

## 2024-01-08 ENCOUNTER — APPOINTMENT (INPATIENT)
Dept: MRI IMAGING | Facility: HOSPITAL | Age: 59
DRG: 871 | End: 2024-01-08
Payer: COMMERCIAL

## 2024-01-08 ENCOUNTER — APPOINTMENT (INPATIENT)
Dept: NON INVASIVE DIAGNOSTICS | Facility: HOSPITAL | Age: 59
DRG: 871 | End: 2024-01-08
Payer: COMMERCIAL

## 2024-01-08 PROBLEM — N17.9 ACUTE-ON-CHRONIC KIDNEY INJURY: Status: ACTIVE | Noted: 2024-01-08

## 2024-01-08 PROBLEM — C85.90 NON-HODGKIN'S LYMPHOMA (HCC): Status: ACTIVE | Noted: 2022-04-19

## 2024-01-08 PROBLEM — U07.1 COVID: Status: ACTIVE | Noted: 2024-01-08

## 2024-01-08 PROBLEM — N18.9 ACUTE-ON-CHRONIC KIDNEY INJURY: Status: ACTIVE | Noted: 2024-01-08

## 2024-01-08 PROBLEM — R29.90 STROKE-LIKE SYMPTOMS: Status: ACTIVE | Noted: 2024-01-08

## 2024-01-08 PROBLEM — R93.0 ABNORMAL CT OF THE HEAD: Status: ACTIVE | Noted: 2024-01-08

## 2024-01-08 LAB
ANION GAP SERPL CALCULATED.3IONS-SCNC: 6 MMOL/L
ATRIAL RATE: 113 BPM
BUN SERPL-MCNC: 18 MG/DL (ref 5–25)
CALCIUM SERPL-MCNC: 8.6 MG/DL (ref 8.4–10.2)
CHLORIDE SERPL-SCNC: 113 MMOL/L (ref 96–108)
CO2 SERPL-SCNC: 20 MMOL/L (ref 21–32)
CREAT SERPL-MCNC: 1.38 MG/DL (ref 0.6–1.3)
ERYTHROCYTE [DISTWIDTH] IN BLOOD BY AUTOMATED COUNT: 13.3 % (ref 11.6–15.1)
GFR SERPL CREATININE-BSD FRML MDRD: 42 ML/MIN/1.73SQ M
GLUCOSE SERPL-MCNC: 109 MG/DL (ref 65–140)
HCT VFR BLD AUTO: 35.1 % (ref 34.8–46.1)
HGB BLD-MCNC: 11.9 G/DL (ref 11.5–15.4)
MCH RBC QN AUTO: 29.5 PG (ref 26.8–34.3)
MCHC RBC AUTO-ENTMCNC: 33.9 G/DL (ref 31.4–37.4)
MCV RBC AUTO: 87 FL (ref 82–98)
P AXIS: 35 DEGREES
PLATELET # BLD AUTO: 251 THOUSANDS/UL (ref 149–390)
PMV BLD AUTO: 9.5 FL (ref 8.9–12.7)
POTASSIUM SERPL-SCNC: 4 MMOL/L (ref 3.5–5.3)
PR INTERVAL: 152 MS
QRS AXIS: 4 DEGREES
QRSD INTERVAL: 70 MS
QT INTERVAL: 300 MS
QTC INTERVAL: 411 MS
RBC # BLD AUTO: 4.03 MILLION/UL (ref 3.81–5.12)
SODIUM SERPL-SCNC: 139 MMOL/L (ref 135–147)
T WAVE AXIS: 39 DEGREES
VENTRICULAR RATE: 113 BPM
WBC # BLD AUTO: 2.37 THOUSAND/UL (ref 4.31–10.16)

## 2024-01-08 PROCEDURE — 80048 BASIC METABOLIC PNL TOTAL CA: CPT

## 2024-01-08 PROCEDURE — 36415 COLL VENOUS BLD VENIPUNCTURE: CPT

## 2024-01-08 PROCEDURE — XW033H5 INTRODUCTION OF TOCILIZUMAB INTO PERIPHERAL VEIN, PERCUTANEOUS APPROACH, NEW TECHNOLOGY GROUP 5: ICD-10-PCS | Performed by: STUDENT IN AN ORGANIZED HEALTH CARE EDUCATION/TRAINING PROGRAM

## 2024-01-08 PROCEDURE — XW033E5 INTRODUCTION OF REMDESIVIR ANTI-INFECTIVE INTO PERIPHERAL VEIN, PERCUTANEOUS APPROACH, NEW TECHNOLOGY GROUP 5: ICD-10-PCS | Performed by: STUDENT IN AN ORGANIZED HEALTH CARE EDUCATION/TRAINING PROGRAM

## 2024-01-08 PROCEDURE — 95819 EEG AWAKE AND ASLEEP: CPT | Performed by: PSYCHIATRY & NEUROLOGY

## 2024-01-08 PROCEDURE — 99223 1ST HOSP IP/OBS HIGH 75: CPT | Performed by: INTERNAL MEDICINE

## 2024-01-08 PROCEDURE — 70551 MRI BRAIN STEM W/O DYE: CPT

## 2024-01-08 PROCEDURE — 97167 OT EVAL HIGH COMPLEX 60 MIN: CPT

## 2024-01-08 PROCEDURE — 99215 OFFICE O/P EST HI 40 MIN: CPT | Performed by: PSYCHIATRY & NEUROLOGY

## 2024-01-08 PROCEDURE — 80175 DRUG SCREEN QUAN LAMOTRIGINE: CPT | Performed by: PHYSICIAN ASSISTANT

## 2024-01-08 PROCEDURE — 85027 COMPLETE CBC AUTOMATED: CPT

## 2024-01-08 PROCEDURE — 95816 EEG AWAKE AND DROWSY: CPT

## 2024-01-08 PROCEDURE — 80201 ASSAY OF TOPIRAMATE: CPT | Performed by: PHYSICIAN ASSISTANT

## 2024-01-08 RX ORDER — LAMOTRIGINE 100 MG/1
200 TABLET ORAL 2 TIMES DAILY
Status: DISCONTINUED | OUTPATIENT
Start: 2024-01-08 | End: 2024-01-10 | Stop reason: HOSPADM

## 2024-01-08 RX ORDER — ONDANSETRON 2 MG/ML
4 INJECTION INTRAMUSCULAR; INTRAVENOUS EVERY 6 HOURS PRN
Status: DISCONTINUED | OUTPATIENT
Start: 2024-01-08 | End: 2024-01-10 | Stop reason: HOSPADM

## 2024-01-08 RX ORDER — VENLAFAXINE HYDROCHLORIDE 37.5 MG/1
75 CAPSULE, EXTENDED RELEASE ORAL DAILY
Status: DISCONTINUED | OUTPATIENT
Start: 2024-01-08 | End: 2024-01-10 | Stop reason: HOSPADM

## 2024-01-08 RX ORDER — VENLAFAXINE HYDROCHLORIDE 75 MG/1
75 CAPSULE, EXTENDED RELEASE ORAL DAILY
Status: ON HOLD | COMMUNITY
Start: 2023-12-18 | End: 2024-12-17

## 2024-01-08 RX ORDER — ACETAMINOPHEN 325 MG/1
650 TABLET ORAL EVERY 6 HOURS PRN
Status: DISCONTINUED | OUTPATIENT
Start: 2024-01-08 | End: 2024-01-09

## 2024-01-08 RX ORDER — LORAZEPAM 2 MG/ML
1 INJECTION INTRAMUSCULAR ONCE
Status: COMPLETED | OUTPATIENT
Start: 2024-01-08 | End: 2024-01-08

## 2024-01-08 RX ORDER — BUSPIRONE HYDROCHLORIDE 5 MG/1
5 TABLET ORAL 2 TIMES DAILY
Status: ON HOLD | COMMUNITY

## 2024-01-08 RX ORDER — HEPARIN SODIUM 5000 [USP'U]/ML
5000 INJECTION, SOLUTION INTRAVENOUS; SUBCUTANEOUS EVERY 8 HOURS SCHEDULED
Status: DISCONTINUED | OUTPATIENT
Start: 2024-01-08 | End: 2024-01-09

## 2024-01-08 RX ORDER — TOPIRAMATE 100 MG/1
100 TABLET, FILM COATED ORAL 2 TIMES DAILY
Status: DISCONTINUED | OUTPATIENT
Start: 2024-01-08 | End: 2024-01-09

## 2024-01-08 RX ORDER — DOCUSATE SODIUM 100 MG/1
100 CAPSULE, LIQUID FILLED ORAL 2 TIMES DAILY
Status: DISCONTINUED | OUTPATIENT
Start: 2024-01-08 | End: 2024-01-09

## 2024-01-08 RX ORDER — SODIUM CHLORIDE 9 MG/ML
100 INJECTION, SOLUTION INTRAVENOUS CONTINUOUS
Status: DISCONTINUED | OUTPATIENT
Start: 2024-01-08 | End: 2024-01-09

## 2024-01-08 RX ADMIN — LORAZEPAM 1 MG: 2 INJECTION INTRAMUSCULAR; INTRAVENOUS at 23:28

## 2024-01-08 RX ADMIN — SODIUM CHLORIDE 100 ML/HR: 0.9 INJECTION, SOLUTION INTRAVENOUS at 04:05

## 2024-01-08 RX ADMIN — DOCUSATE SODIUM 100 MG: 100 CAPSULE, LIQUID FILLED ORAL at 09:49

## 2024-01-08 RX ADMIN — LAMOTRIGINE 200 MG: 100 TABLET ORAL at 09:49

## 2024-01-08 RX ADMIN — VENLAFAXINE HYDROCHLORIDE 75 MG: 75 CAPSULE, EXTENDED RELEASE ORAL at 09:49

## 2024-01-08 RX ADMIN — TOPIRAMATE 100 MG: 100 TABLET, FILM COATED ORAL at 09:49

## 2024-01-08 NOTE — ASSESSMENT & PLAN NOTE
"  Mild COVID pathway as below   COVID19- positive on 1/7   Supplemental oxygen with 2L N/C saturating 94%  Wean as tolerated for oxygen saturation greater than 92%  CXR pending \"  CBC and CMP daily  Troponin, CK, BNP and CRP   AC per protocol   "

## 2024-01-08 NOTE — PHYSICAL THERAPY NOTE
Physical Therapy Cancellation Note        PT order received. Chart review performed. At this time, PT evaluation cancelled as per chart review and communication with Dr. Bae, pt planning for transfer to [facility]. PT will continue to follow and evaluate as patient is medically appropriate for skilled PT interventions.       01/08/24 1352   PT Last Visit   PT Visit Date 01/08/24   Note Type   Note type Cancelled Session;Evaluation   Cancel Reasons Other         Jazmin Sal, PT

## 2024-01-08 NOTE — H&P
"Formerly [facility]  H&P  Name: Carla Hunt 58 y.o. female I MRN: [id number]  Unit/Bed#: FT 02 I Date of Admission: 1/7/2024   Date of Service: 1/8/2024 I Hospital Day: 0      Assessment/Plan   * Stroke-like symptoms  Assessment & Plan  Presents with confusion, aphasia, lethargy, dizziness and weakness that started Wednesday.  Of note patient recently treated for UTI with keflex, had an adverse reaction of dizziness and started on Cipro. +COVID on admission. Hx of seizures s/p lobectomy    CTH (01/07): Punctate hyperdense foci noted at both frontal region suspicious for tiny foci of acute subarachnoid hemorrhage. No prior studies are available for comparison. Short-term follow-up CT brain in 6 to 12 hours and/or MRI brain is recommended for further evaluation.Stable left temporal lobe encephalomalacia, compatible with the patient's history of prior left temporal lobectomy.-Findings discussed with Neurosurgery see note dated 1/7  CTA head/neck (1/7): No evidence of intracranial hemorrhage  Keep SBP< 140  MRI, per neuro  Neuro consult, appreciate recommendations  PT, OT,  consults   Neurochecks per protocol  Monitor on tele   Stat CTH for any acute neuro changes  Heparin DVT prophylaxis      Acute-on-chronic kidney injury   Assessment & Plan  Lab Results   Component Value Date    EGFR 38 01/07/2024    EGFR 50 12/26/2023    EGFR 56 10/30/2020    CREATININE 1.50 (H) 01/07/2024    CREATININE 1.19 12/26/2023    CREATININE 1.12 10/30/2020   Cr 1.5 on admission,  IV hydration  Avoid Hypotension  Avoid nephrotoxins  Daily BMP    COVID  Assessment & Plan    Mild COVID pathway as below   COVID19- positive on 1/7   Supplemental oxygen with 2L N/C saturating 94%  Wean as tolerated for oxygen saturation greater than 92%  CXR pending \"  CBC and CMP daily  Troponin, CK, BNP and CRP   AC per protocol     Localization-related symptomatic epilepsy and epileptic syndromes with complex partial seizures, " intractable, without status epilepticus (HCC)  Assessment & Plan  S/P lobectomy 2007  Stable on lamotrigine, topamax  Seizure precautions    VTE Pharmacologic Prophylaxis: VTE Score: 4 Moderate Risk (Score 3-4) - Pharmacological DVT Prophylaxis Ordered: heparin.  Code Status: Level 1 - Full Code   Discussion with family: Updated  () at bedside.    Anticipated Length of Stay: Patient will be admitted on an inpatient basis with an anticipated length of stay of greater than 2 midnights secondary to stroke-like symptoms.    Total Time Spent on Date of Encounter in care of patient: 75 mins. This time was spent on one or more of the following: performing physical exam; counseling and coordination of care; obtaining or reviewing history; documenting in the medical record; reviewing/ordering tests, medications or procedures; communicating with other healthcare professionals and discussing with patient's family/caregivers.    Chief Complaint:    Chief Complaint   Patient presents with    CVA/TIA-like Symptoms     Pt came by EMS from home c/o stroke like symptoms beginning 6 days ago. Slurred speech, uneven gait, difficulty forming sentences. No prior Hx. Dx with UTI about a week ago.          History of Present Illness:  Carla Hunt is a 58 y.o. female with a PMH of epilepsy, anxiety and Stage III CKD who presents with stroke like symptoms.  Patient is poor historian due to confusion and aphasia.  Per patient's  she has become increasingly weak and confused over the last 6 days.  Of note patient has poor appetite, has been slurring words, and impaired memory.  She was initially treated for UTI by her PCP with during onset of symptoms.  She was reported to have a bad reaction to Keflex which caused dizziness.  Medication was then changed to Cipro.  However symptoms persisted.  Patient brought to the ED by .  CT of the head reveals tiny foci of acute subarachnoid hemorrhage. Case  discussed with [facility] neurosurgery. Ok for patient to stay at [facility], please see note dated . Patient also noted to be positive for COVID on admission. Will admit inpatient for further evaluation.     Review of Systems:  Review of Systems   Constitutional:  Positive for fatigue.   HENT: Negative.     Respiratory:  Positive for shortness of breath.    Cardiovascular:  Negative for chest pain.   Gastrointestinal: Negative.    Neurological:  Positive for weakness.   Psychiatric/Behavioral:  Positive for confusion.        Past Medical and Surgical History:   Past Medical History:   Diagnosis Date    Hx of hypercalcemia     Lymphoma (HCC) 2021    Parathyroid disease (HCC)     Seizures (HCC)     Situational depression     24 yr old son  from drug overdose       Past Surgical History:   Procedure Laterality Date    CRANIOTOMY FOR TEMPORAL LOBECTOMY Left        Meds/Allergies:  Prior to Admission medications    Medication Sig Start Date End Date Taking? Authorizing Provider   venlafaxine (EFFEXOR-XR) 75 mg 24 hr capsule Take 75 mg by mouth daily 23 Yes Historical Provider, MD   busPIRone (BUSPAR) 5 mg tablet Take 5 mg by mouth 2 (two) times a day    Historical Provider, MD   escitalopram (LEXAPRO) 10 mg tablet  5/10/21   Historical Provider, MD   ibuprofen (MOTRIN) 600 mg tablet Take 1 tablet (600 mg total) by mouth every 6 (six) hours as needed for mild pain for up to 10 days  Patient not taking: Reported on 2023 10/21/20 5/26/22  Mallory Floyd MD   lamoTRIgine (LaMICtal) 200 MG tablet Take 1 tablet (200 mg total) by mouth 2 (two) times a day 23   Jamari Nino MD   lamoTRIgine (LaMICtal) 200 MG tablet 1 tablet by mouth twice per day. 23   Jamari Nino MD   medroxyPROGESTERone acetate (DEPO-PROVERA SYRINGE) 150 mg/mL injection Inject 150 mg into a muscle every 3 (three) months  3/26/19   Historical Provider, MD   ondansetron (ZOFRAN-ODT) 4 mg disintegrating tablet Take 1  tablet (4 mg total) by mouth every 8 (eight) hours as needed for nausea or vomiting  Patient not taking: Reported on 11/9/2020 10/21/20 11/20/20  Mallory Floyd MD   QUEtiapine (SEROquel) 50 mg tablet Take 50 mg by mouth daily at bedtime   Patient not taking: Reported on 5/26/2022 2/19/19   Historical Provider, MD   tamsulosin (FLOMAX) 0.4 mg Take 1 capsule (0.4 mg total) by mouth daily with dinner  Patient not taking: Reported on 11/9/2020 10/21/20   Mallory Floyd MD   topiramate (TOPAMAX) 100 mg tablet Take 1 tablet (100 mg total) by mouth 2 (two) times a day 5/16/23   Jamari Nino MD   topiramate (TOPAMAX) 100 mg tablet Take 1 tablet (100 mg total) by mouth 2 (two) times a day 9/12/23   Jamari Nino MD     I have reviewed home medications with patient personally.    Allergies: No Known Allergies    Social History:  Marital Status: /Civil Union   Occupation:   Patient Pre-hospital Living Situation: Home, With spouse  Patient Pre-hospital Level of Mobility: walks  Patient Pre-hospital Diet Restrictions:   Substance Use History:   Social History     Substance and Sexual Activity   Alcohol Use No     Social History     Tobacco Use   Smoking Status Never   Smokeless Tobacco Never     Social History     Substance and Sexual Activity   Drug Use Never       Family History:  Family History   Problem Relation Age of Onset    Diabetes Mother     Cancer Father        Physical Exam:     Vitals:   Blood Pressure: 111/58 (01/08/24 0500)  Pulse: 98 (01/08/24 0500)  Temperature: 99.6 °F (37.6 °C) (01/07/24 1909)  Temp Source: Oral (01/07/24 1909)  Respirations: 22 (01/08/24 0500)  SpO2: 92 % (01/08/24 0500)    Physical Exam  Constitutional:       Appearance: She is ill-appearing.   Eyes:      General: No scleral icterus.     Pupils: Pupils are equal, round, and reactive to light.   Cardiovascular:      Rate and Rhythm: Regular rhythm. Tachycardia present.      Pulses: Normal pulses.      Heart sounds: Normal  heart sounds.   Pulmonary:      Effort: Pulmonary effort is normal.   Abdominal:      General: Bowel sounds are normal. There is no distension.      Palpations: Abdomen is soft.   Skin:     General: Skin is warm.      Capillary Refill: Capillary refill takes 2 to 3 seconds.   Neurological:      Mental Status: She is alert. She is confused.      Cranial Nerves: Dysarthria present.      Motor: Weakness present.      Comments: aphasia   Psychiatric:         Mood and Affect: Mood normal.         Speech: Speech is delayed and slurred.         Behavior: Behavior normal.         Cognition and Memory: Cognition is impaired.          Additional Data:     Lab Results:  Results from last 7 days   Lab Units 01/08/24  0410 01/07/24 1916   WBC Thousand/uL 2.37* 3.44*   HEMOGLOBIN g/dL 11.9 13.5   HEMATOCRIT % 35.1 40.7   PLATELETS Thousands/uL 251 308   NEUTROS PCT %  --  87*   LYMPHS PCT %  --  4*   MONOS PCT %  --  7   EOS PCT %  --  0     Results from last 7 days   Lab Units 01/08/24  0410 01/07/24  1916   SODIUM mmol/L 139 136   POTASSIUM mmol/L 4.0 3.7   CHLORIDE mmol/L 113* 107   CO2 mmol/L 20* 20*   BUN mg/dL 18 22   CREATININE mg/dL 1.38* 1.50*   ANION GAP mmol/L 6 9   CALCIUM mg/dL 8.6 9.3   ALBUMIN g/dL  --  3.7   TOTAL BILIRUBIN mg/dL  --  0.60   ALK PHOS U/L  --  123*   ALT U/L  --  92*   AST U/L  --  141*   GLUCOSE RANDOM mg/dL 109 131     Results from last 7 days   Lab Units 01/07/24  1916   INR  1.07     Results from last 7 days   Lab Units 01/07/24  1904   POC GLUCOSE mg/dl 138         Results from last 7 days   Lab Units 01/07/24  1916   LACTIC ACID mmol/L 1.8       Lines/Drains:  Invasive Devices       Peripheral Intravenous Line  Duration             Peripheral IV 01/08/24 Right Antecubital <1 day                        Imaging: Reviewed radiology reports from this admission including: CT head  CT head without contrast   Final Result by Edgardo Santoyo MD (01/07 2039)      Punctate hyperdense foci noted at  both frontal region suspicious for tiny foci of acute subarachnoid hemorrhage. No prior studies are available for comparison. Short-term follow-up CT brain in 6 to 12 hours and/or MRI brain is recommended for further    evaluation.      Stable left temporal lobe encephalomalacia, compatible with the patient's history of prior left temporal lobectomy.               I personally discussed this study with ANGELIQUE HOUSE on 1/7/2024 8:31 PM.               Workstation performed: [id number]         XR chest 1 view portable    (Results Pending)   CTA head and neck with and without contrast    (Results Pending)       EKG and Other Studies Reviewed on Admission:   EKG: Sinus Tachycardia. .    ** Please Note: This note has been constructed using a voice recognition system. **

## 2024-01-08 NOTE — CONSULTS
e-Consult (IPC)     Inpatient consult to Neurocritical care  Consult performed by: Carla Mao MD  Consult ordered by: Luisito Saldaña MD           Contacted by Dr Saldaña.    Carla Hunt 58 y.o. female MRN: [id number]  Unit/Bed#: ED 25 Encounter: [id number]    Reason for Consult    Per provider report, patient presents with confusion, lethargy in setting of Covid-19 infection. Available past medical history,social history, surgical history, medication list, drug allergies and review of systems were reviewed.    /63 (BP Location: Right arm)   Pulse 96   Temp 99.6 °F (37.6 °C) (Oral)   Resp 22   SpO2 92%      Clinical exam per provider report, no acute focal neuro changes.    Imaging personally reviewed. CT head- Punctate hyperdense foci noted at both frontal region suspicious for tiny foci of acute subarachnoid hemorrhage. No prior studies are available for comparison. Short-term follow-up CT brain in 6 to 12 hours and/or MRI brain is recommended for further   evaluation.     Stable left temporal lobe encephalomalacia, compatible with the patient's history of prior left temporal lobectomy    Assessment and Recommendations    1. encephalopathy  2. Covid-19 INFECTION  3. Tiny foci SAH in left frontal  4- ELIESER    PLAN  Called re- radiographic finding of tiny foci of left frontal SAH.  The patient has no headache  I advised to get a CTA head now and eventually an MRI brain  Would keep SBP< 140  He can stay in [facility] for now      All questions answered. Provider is in agreement with the course of action. 31 + minutes, >50% of the total time devoted to medical consultative verbal/EMR discussion between providers. Written report will be generated in the EMR.

## 2024-01-08 NOTE — ASSESSMENT & PLAN NOTE
"58 y.o.  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 Pt found to have COVID infection in ED as well as hyperdense foci in both frontal regions suspicious for tiny foci of acute subarachnoid hemorrhage.     Neurodiagnostics:   - CTA H/N wwo contrast 1/7/24:   \"No acute arterial vascular abnormality in the head or neck. No flow-limiting large vessel arterial vascular stenosis in the head or neck. Tiny punctate foci of relative increased parenchymal density in the frontal lobes bilaterally, unchanged from 2 hours earlier and probably representing low-density parenchymal calcifications rather than parenchymal hemorrhage. Conservative management with an additional follow-up noncontrast head CT is recommended in 24 to 48 hours. Reidentified left temporal lobe resection. Dense basal ganglia and cerebellar calcifications again noted. Groundglass and consolidative airspace opacities most suspicious for extensive pneumonia seen throughout the visualized mid and upper lung zones more dense on the right than on the left.\"  - CTH wo contrast 1/7/24:   \"Multifocal pneumonia and/or edema.\"  - routine EEG 1/8/24:   \"This is an abnormal 30 minutes awake, drowsy, and asleep EEG due to disorganized theta-delta activity. Background activities in the delta/theta range are suggestive of a moderate diffuse cerebral dysfunction. The lack of epileptiform discharges on a routine EEG does not preclude the diagnosis of seizures or epilepsy. \"    Concern for focal seizures in setting of covid infection versus UTI versus worsening renal function.     Plan  - routine EEG completed, see above    - recommend pt be transferred to [facility] for vEEG monitoring urgently. Requested ICU team at [facility] discuss case " with Neuro critical care team at \[facility]\"" in hopes of expediting transfer.   - MRI brain wo contrast completed, see below, would have preferred to complete imaging wwo contrast but given worsening renal function, will hold off on contrast at this point   - Now with renal function improving, recommend MRI brain w contrast  - Low threshold for LP if pt develops fevers again and/or pending MRI brain w contrast results   - If LP completed, CSF labs recommended: RBC, WBC, protein, glucose, ME panel, cryptococcal, flow cytometry, cytology  - Vimpat 400 mg IV x 1 load administered at 1022 AM 1/9/24 while pt was NPO, without NG tube placed  -NG tube placed 1/9/24  - Topiramate increased to 150 mg twice daily on 1/9/24.   - continue home Lamictal 100 mg BID  - If patient continues to have fluctuations in exam with the topiramate and lamotrigine,  then recommend discontinuing administering Keppra bolus and starting Keppra maintenance dosing  - lamotrigine and topiramate levels 1/8/24 pending   - Administer Ativan prn seizure-like activity    - telemetry  - Continue seizure precautions   - Discussed seizure precautions  - Frequent neuro checks.  Continue to monitor and notify Neurology with any changes.  - Medical management and supportive care per primary team.  Correction of any metabolic or infectious disturbances.

## 2024-01-08 NOTE — OCCUPATIONAL THERAPY NOTE
Occupational Therapy Evaluation      Carla Davidsonnidia    2024    Principal Problem:    Stroke-like symptoms  Active Problems:    Localization-related symptomatic epilepsy and epileptic syndromes with complex partial seizures, intractable, without status epilepticus (HCC)    COVID    Acute-on-chronic kidney injury       Past Medical History:   Diagnosis Date    Hx of hypercalcemia     Lymphoma (HCC) 2021    Parathyroid disease (HCC)     Seizures (HCC)     Situational depression     24 yr old son  from drug overdose       Past Surgical History:   Procedure Laterality Date    CRANIOTOMY FOR TEMPORAL LOBECTOMY Left 24 1023   OT Last Visit   OT Visit Date 24   Note Type   Note type Evaluation   Pain Assessment   Pain Assessment Tool 0-10   Pain Score No Pain   Restrictions/Precautions   Other Precautions Cognitive;Contact/isolation;Airborne/isolation;Multiple lines;Telemetry;O2;Fall Risk  (1L via NC)   Home Living   Type of Home House   Home Layout Two level;Bed/bath upstairs;1/2 bath on main level  (3STE; 16 to 2nd floor)   Bathroom Shower/Tub Walk-in shower   Bathroom Toilet Standard   Bathroom Equipment Hand-held shower   Bathroom Accessibility Accessible   Additional Comments no DME at baseline   Prior Function   Level of [place] Independent with ADLs;Independent with functional mobility;Independent with IADLS   Lives With Spouse;Daughter;Son  (15 yo son & 26 dtr)   Receives Help From Family   IADLs Independent with driving;Independent with meal prep;Independent with medication management   Falls in the last 6 months 0   Vocational On disability  ()   Lifestyle   Autonomy Pt's spouse reports PLOF was Ind with ADLs, IADLs, and (+) driving.   Reciprocal Relationships spouse and children   ADL   Eating Assistance 4  Minimal Assistance   Eating Deficit Setup;Impulsive;Verbal cueing;Supervision/safety;Increased time to complete   Grooming Assistance 4  Minimal Assistance    Grooming Deficit Supervision/safety;Verbal cueing;Impulsive;Steadying;Setup;Increased time to complete   UB Bathing Assistance 3  Moderate Assistance   UB Bathing Deficit Supervision/safety;Verbal cueing;Impulsive;Steadying;Setup;Increased time to complete   LB Bathing Assistance 2  Maximal Assistance   LB Bathing Deficit Supervision/safety;Verbal cueing;Impulsive;Steadying;Setup;Increased time to complete   UB Dressing Assistance 3  Moderate Assistance   UB Dressing Deficit Supervision/safety;Verbal cueing;Impulsive;Steadying;Setup;Increased time to complete   LB Dressing Assistance 2  Maximal Assistance   LB Dressing Deficit Supervision/safety;Verbal cueing;Impulsive;Steadying;Setup;Increased time to complete   Toileting Assistance  2  Maximal Assistance   Toileting Deficit Increased time to complete;Verbal cueing;Supervison/safety;Steadying;Setup   Functional Assistance 2  Maximal Assistance   Functional Deficit Supervision/safety;Verbal cueing;Impulsive;Steadying;Setup;Increased time to complete   Bed Mobility   Rolling R 3  Moderate assistance   Additional items Assist x 1;Increased time required;LE management;Verbal cues   Supine to Sit 3  Moderate assistance   Additional items Assist x 1;Increased time required;LE management;Verbal cues   Sit to Supine 3  Moderate assistance   Additional items Assist x 1;Increased time required;LE management;Verbal cues   Transfers   Sit to Stand 3  Moderate assistance   Additional items Assist x 1;Increased time required;Verbal cues;Impulsive   Stand to Sit 3  Moderate assistance   Additional items Assist x 1;Increased time required;Verbal cues;Impulsive   Balance   Static Sitting Fair +   Dynamic Sitting Fair   Static Standing Poor +   Dynamic Standing Poor   Ambulatory Poor -   Activity Tolerance   Activity Tolerance Treatment limited secondary to agitation;Patient limited by fatigue   Nurse Made Aware yes, spoke with pt's MARSHA Talamantes who stated pt was appropriate for OT  "and made aware of outcomes   RUE Assessment   RUE Assessment   (difficult to assess due to decreased cognition and ability to follow commands. ROM WFL/ MMT -4/5)   LUE Assessment   LUE Assessment   (difficult to assess due to decreased cognition and ability to follow commands. ROM WFL/ MMT -4/5)   Hand Function   Hand Function Comments difficult to assess due to decreased cognition and ability to follow commands and answer questions appropriately and consistently.   Sensation   Additional Comments Difficult to assess due to cognition; however, when asked if she could feel OT touching her L forearm pt said \"no\". When asked while testing R arm, pt stated \"yes\". OT reattempted and confirmed same results.   Vision - Complex Assessment   Additional Comments Difficult to assess due to cognitive deficits. Pt was able to accurately ID 2/5 amount of finger shown. Pt also cotinally keeps her L eye closed. When OT asked if she was seeing double, pt stated \"not really.\" Further tested waranted.   Psychosocial   Psychosocial (WDL) X   Patient Behaviors/Mood Flat affect   Cognition   Overall Cognitive Status Impaired   Arousal/Participation Persistent stimuli required   Attention Difficulty attending to directions   Orientation Level Disoriented to time;Disoriented to situation;Oriented to person  (\"Drs office\")   Memory Decreased recall of precautions;Decreased recall of recent events;Decreased short term memory   Following Commands Follows one step commands inconsistently   Comments Perseverative, poor direction followin, problem solving   Assessment   Limitation Decreased ADL status;Decreased UE ROM;Decreased UE strength;Decreased Safe judgement during ADL;Decreased cognition;Decreased endurance;Decreased self-care trans;Decreased high-level ADLs;Non-func L UE   Assessment Pt is a 58 y.o. female seen for OT evaluation s/p admit to [facility] on 1/7/2024 w/ Stroke-like symptoms. Comorbidities affecting pt's functional " performance at time of assessment include:ELIESER, anxiety, COVID-19, and subjective visual disturbances, HTN d/o, eczema, luetscher's syndrome, rosacea, and localization-related symptomatic epilepsy  . Orders placed for OT evaluation and treatment.  Performed at least two patient identifiers during session including name and wristband. Personal factors affecting pt at time of IE include:steps to enter environment, difficulty performing ADLS, difficulty performing IADLS , limited insight into deficits, compliance, financial barriers, health management , and environment. Pt was a poor historian and unable to provided answers to simple questions. Spouse reports prior to admission, pt's was Ind with ADLs, Ind with IADLs, and (+) driving.  Upon evaluation: Pt requires mod A with UB ADLs, max a with LB ADLs, bed mobility with mod A, and mod A with sit to stand. Limitations 2* the following deficits impacting occupational performance: weakness, decreased strength, decreased dynamic sit/ stand balance, decreased activity tolerance, decreased standing tolerance time for self care and functional mobility, impaired memory, impaired sequencing, impaired problem solving, impulsivity, decreased safety awareness, impaired interpersonal skills, environmental deficits, decreased coping skills, decreased mobilty, and requiring external assistance to complete transitional movements. Pt to benefit from continued skilled OT tx while in the hospital to address deficits as defined above and maximize level of functional independence w ADL's and functional mobility. Occupational Performance areas to address include: grooming, bathing/shower, toilet hygiene, dressing, medication management, socialization, health maintenance, functional mobility, community mobility, clothing management, cleaning, meal prep, money management, household maintenance, and care of children. From OT standpoint, recommendation at time of d/c would be Level I (Max  Resource Intensity).   Plan   Treatment Interventions ADL retraining;Functional transfer training;UE strengthening/ROM;Endurance training;Cognitive reorientation;Patient/family training;Equipment evaluation/education;Compensatory technique education;Continued evaluation;Cardiac education;Activityengagement;Energy conservation   Goal Expiration Date 01/21/24   OT Frequency 3-5x/wk   Discharge Recommendation   Rehab Resource Intensity Level, OT I (Maximum Resource Intensity)   AM-PAC Daily Activity Inpatient   Lower Body Dressing 2   Bathing 2   Toileting 2   Upper Body Dressing 2   Grooming 3   Eating 3   Daily Activity Raw Score 14   Daily Activity Standardized Score (Calc for Raw Score >=11) 33.39   AM-PAC Applied Cognition Inpatient   Following a Speech/Presentation 1   Understanding Ordinary Conversation 2   Taking Medications 1   Remembering Where Things Are Placed or Put Away 2   Remembering List of 4-5 Errands 2   Taking Care of Complicated Tasks 1   Applied Cognition Raw Score 9   Applied Cognition Standardized Score 22.48     Occupational Therapy goals: In 7-14 days:    Patient will verbalize and demonstrate use of energy conservation/ deep breathing technique and work simplification skills during functional activity with no verbal cues.    Patient will verbalize and demonstrate good body mechanics and joint protection techniques during  ADLs/ IADLs with no verbal cues.    Patient will increase OOB/ sitting tolerance to 2-4 hours per day for increased participation in self care and leisure tasks with no s/s of exertion.    Patient will increase standing tolerance time to 5  minutes with unilateral UE support to complete sink level ADLs@ mod I level.    Patient will increase sitting tolerance at edge of bed to 20 minutes to complete UB ADLs @ set up assist level.    Patient will transfer bed to Chair / toilet at Set up assist level with AD as indicated.    Patient will complete UB ADLs with set up  assist.    Patient will complete LB ADLs with min assist with the use of adaptive equipment.    Patient will complete toileting hygiene with set up assist/ supervision for thoroughness.    Patient/ Family  will demonstrate competency with UE Home Exercise Program.       Pita Delatorre MS OTR/L

## 2024-01-08 NOTE — ASSESSMENT & PLAN NOTE
Presents with confusion, aphasia, lethargy, dizziness and weakness that started Wednesday.  Of note patient recently treated for UTI with keflex, had an adverse reaction of dizziness and started on Cipro. +COVID on admission. Hx of seizures s/p lobectomy    CTH (01/07): Punctate hyperdense foci noted at both frontal region suspicious for tiny foci of acute subarachnoid hemorrhage. No prior studies are available for comparison. Short-term follow-up CT brain in 6 to 12 hours and/or MRI brain is recommended for further evaluation.Stable left temporal lobe encephalomalacia, compatible with the patient's history of prior left temporal lobectomy.-Findings discussed with Neurosurgery see note dated 1/7  CTA head/neck (1/7): No evidence of intracranial hemorrhage  Keep SBP< 140  MRI, per neuro  Neuro consult, appreciate recommendations  PT, OT,  consults   Neurochecks per protocol  Monitor on tele   Stat CTH for any acute neuro changes  Heparin DVT prophylaxis

## 2024-01-08 NOTE — NURSING NOTE
Patient was able to tolerate nectar thick fluid without complications. No coughing nor choking episode noted. No loss of fluid noted from mouth. Upgraded to thin liquids. Patient able to tolerate thin liquids and swallowed whole pills--one at a time--without issues as well.

## 2024-01-08 NOTE — CONSULTS
"Consultation - Neurology   Carla Hunt 58 y.o. female MRN: [id number]  Unit/Bed#: FT 02 Encounter: [id number]      Assessment/Plan     Localization-related symptomatic epilepsy and epileptic syndromes with complex partial seizures, intractable, without status epilepticus (HCC)  Assessment & Plan  58 y.o.  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 Pt found to have COVID infection in ED as well as hyperdense foci in both frontal regions suspicious for tiny foci of acute subarachnoid hemorrhage.     Neurodiagnostics:   - CTA H/N wwo contrast 1/7/24:   \"No acute arterial vascular abnormality in the head or neck. No flow-limiting large vessel arterial vascular stenosis in the head or neck. Tiny punctate foci of relative increased parenchymal density in the frontal lobes bilaterally, unchanged from 2 hours earlier and probably representing low-density parenchymal calcifications rather than parenchymal hemorrhage. Conservative management with an additional follow-up noncontrast head CT is recommended in 24 to 48 hours. Reidentified left temporal lobe resection. Dense basal ganglia and cerebellar calcifications again noted. Groundglass and consolidative airspace opacities most suspicious for extensive pneumonia seen throughout the visualized mid and upper lung zones more dense on the right than on the left.\"  - CTH wo contrast 1/7/24:   \"Multifocal pneumonia and/or edema.\"    Concern for focal seizures in setting of covid infection versus UTI versus worsening renal function.     Plan  - routine EEG pending   - MRI brain wo contrast pending, would prefer to complete imaging wwo contrast but given worsening renal function, will hold off on contrast at this point   - " "continue home lamotrigine 200 mg BID and topiramate 100 mg BID    - obtain lamotrigine and topiramate level 30 minutes prior to night dose on 1/8/24   - Administer Ativan prn seizure-like activity    - telemetry  - Continue seizure precautions   - Discussed seizure precautions  - Frequent neuro checks.  Continue to monitor and notify Neurology with any changes.  - Medical management and supportive care per primary team.  Correction of any metabolic or infectious disturbances.                  Recommendations for outpatient neurological follow up have yet to be determined.    **History and physical examination obtained by attending neurologist. AP in room as witness to history and physical examination obtained and acted solely as a scribe for attending neurologist. This AP did not provide any decision making for this encounter.**        History of Present Illness     Reason for Consult / Principal Problem: subarachnoid hemorrhage   Hx and PE limited by: Pt with difficulty speaking, pt's  able to supplement history.   HPI: Carla Hunt is a 58 y.o.  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 Pt found to have COVID infection in ED as well as hyperdense foci in both frontal regions suspicious for tiny foci of acute subarachnoid hemorrhage.       Patient globally aphasic and unable to provide history.  Patient's  in room and able to give history.  Per patient's :  Patient started to complain of abdominal pain on December 26, 2023 and her doctor said that she had a UTI.  Patient came home on Wednesday, 1/3/2024 went to her memory was \"gone.\"  For the past 5 days, patient has been sleeping and not eating anything.  Patient is unable to remember " "anything.  Patient has progressively gotten worse since last Wednesday.  Patient's  notes that patient has been in like this in the past when she has had seizures but has never been in this state for this long.  Patient's  has witnessed patient having grand mal seizures in the past but that has stopped since temporal lobe resection in .  Patient's last seizure was \"quite a while ago,\" estimated to be years ago.  Patient has not missed any doses of her AEDs.  At baseline, patient with no difficulty speaking even since temporal lobe resection.  Patient is independent at baseline.  Patient's  has been home for the past month and has only noticed problems within the past 2 weeks.  Patient is more awake in the hospital than she had been at home.     Previous Neurologic History  Patient follows with  [facility]  for patient related symptomatic epilepsy as an outpatient, last seen by Dr. iNno on 2023.  At time of last outpatient appointment, patient seizure-free on lamotrigine 200 mg twice daily and topiramate 100 mg twice daily and without any significant side effects.  Patient recommended to continue on seizure medications given difficult to controlling seizures in the past as well as her previous epilepsy surgery at [facility] in .  If patient were to have any additional seizures, recommendation is to increase upon an AED medication the patient is already on.    Inpatient consult to Neurology  Consult performed by: Norma Alfaro PA-C  Consult ordered by: DANYELLE Spann          Review of Systems   Reason unable to perform ROS: aphasia.       Historical Information   Past Medical History:   Diagnosis Date    Hx of hypercalcemia     Lymphoma (HCC) 2021    Parathyroid disease (HCC)     Seizures (HCC)     Situational depression     24 yr old son  from drug overdose     Past Surgical History:   Procedure Laterality Date    CRANIOTOMY FOR " TEMPORAL LOBECTOMY Left 2007     Social History   Social History     Substance and Sexual Activity   Alcohol Use No     Social History     Substance and Sexual Activity   Drug Use Never     E-Cigarette/Vaping    E-Cigarette Use Never User      E-Cigarette/Vaping Substances    Nicotine No     THC No     CBD No      Social History     Tobacco Use   Smoking Status Never   Smokeless Tobacco Never     Family History:   Family History   Problem Relation Age of Onset    Diabetes Mother     Cancer Father        Review of previous medical records was  completed.     Meds/Allergies   all current active meds have been reviewed, current meds:   Current Facility-Administered Medications   Medication Dose Route Frequency    acetaminophen (TYLENOL) tablet 650 mg  650 mg Oral Q6H PRN    docusate sodium (COLACE) capsule 100 mg  100 mg Oral BID    heparin (porcine) subcutaneous injection 5,000 Units  5,000 Units Subcutaneous Q8H SARTHAK    lamoTRIgine (LaMICtal) tablet 200 mg  200 mg Oral BID    ondansetron (ZOFRAN) injection 4 mg  4 mg Intravenous Q6H PRN    sodium chloride 0.9 % infusion  100 mL/hr Intravenous Continuous    topiramate (TOPAMAX) tablet 100 mg  100 mg Oral BID    venlafaxine (EFFEXOR-XR) 24 hr capsule 75 mg  75 mg Oral Daily   , and PTA meds:   Prior to Admission Medications   Prescriptions Last Dose Informant Patient Reported? Taking?   QUEtiapine (SEROquel) 50 mg tablet  Self Yes No   Sig: Take 50 mg by mouth daily at bedtime    Patient not taking: Reported on 5/26/2022   busPIRone (BUSPAR) 5 mg tablet   Yes No   Sig: Take 5 mg by mouth 2 (two) times a day   escitalopram (LEXAPRO) 10 mg tablet   Yes No   ibuprofen (MOTRIN) 600 mg tablet   No No   Sig: Take 1 tablet (600 mg total) by mouth every 6 (six) hours as needed for mild pain for up to 10 days   Patient not taking: Reported on 5/16/2023   lamoTRIgine (LaMICtal) 200 MG tablet   No No   Sig: Take 1 tablet (200 mg total) by mouth 2 (two) times a day   lamoTRIgine  (LaMICtal) 200 MG tablet   No No   Si tablet by mouth twice per day.   medroxyPROGESTERone acetate (DEPO-PROVERA SYRINGE) 150 mg/mL injection  Self Yes No   Sig: Inject 150 mg into a muscle every 3 (three) months    ondansetron (ZOFRAN-ODT) 4 mg disintegrating tablet  Self No No   Sig: Take 1 tablet (4 mg total) by mouth every 8 (eight) hours as needed for nausea or vomiting   Patient not taking: Reported on 2020   tamsulosin (FLOMAX) 0.4 mg  Self No No   Sig: Take 1 capsule (0.4 mg total) by mouth daily with dinner   Patient not taking: Reported on 2020   topiramate (TOPAMAX) 100 mg tablet   No No   Sig: Take 1 tablet (100 mg total) by mouth 2 (two) times a day   topiramate (TOPAMAX) 100 mg tablet   No No   Sig: Take 1 tablet (100 mg total) by mouth 2 (two) times a day   venlafaxine (EFFEXOR-XR) 75 mg 24 hr capsule   Yes Yes   Sig: Take 75 mg by mouth daily      Facility-Administered Medications: None       No Known Allergies    Objective   Vitals:Blood pressure 120/65, pulse 98, temperature 99 °F (37.2 °C), temperature source Oral, resp. rate 19, SpO2 95%.,There is no height or weight on file to calculate BMI.    Intake/Output Summary (Last 24 hours) at 2024 1114  Last data filed at 2024 2043  Gross per 24 hour   Intake 1000 ml   Output --   Net 1000 ml       Invasive Devices:   Invasive Devices       Peripheral Intravenous Line  Duration             Peripheral IV 24 Right Antecubital <1 day                    Physical Exam  Vitals and nursing note reviewed.   HENT:      Head: Normocephalic and atraumatic.   Eyes:      Comments: Able to track provider in room with eyes    Cardiovascular:      Rate and Rhythm: Normal rate.   Pulmonary:      Effort: Pulmonary effort is normal.   Neurological:      Mental Status: She is alert.      Coordination: Finger-nose-finger test: unable to test due to pt unable to follow comples commands with global aphasia.       Neurologic Exam     Mental Status  "  Follows 1 step (stick out hand, show 2 fingers) commands.   Attention: appropriately attends to provider.   Speech: (Global Aphasia appreciated on exam )  Level of consciousness: alert  Unable to name object. Unable to repeat.   - oriented to self  - able to name  in room, [place]  - when asked when his birthday is, able to correctly state 's date of birth   - when asked current month, perseverates on repeating 's birthday.   - difficulty with comprehension, unable to follow command to touch right ear with L hand   - unable to name objects (watch)  - unable to repeat phrase \"traffic conditions are heavy today,\" only repeats \"traffic\"     Cranial Nerves     CN III, IV, VI   Conjugate gaze: present    CN VII   Facial expression full, symmetric.     CN VIII   Hearing impaired: appears grossly intact.  - unable to formally test visual fields and EOMs due to global aphasia   - able to track provider around room with eyes   - blink to threat intact b/l      Motor Exam   Muscle bulk: normalStrength to confrontation testing:   - LUE aduction 5/5   - RUE abduction 5/5   - BLE 5/5 with hip flexion   - plantarflexion and dorsiflexion 5/5 b/l      Sensory Exam   Unable to formally test sensation reliably due to global aphasia      Gait, Coordination, and Reflexes     Gait  Gait: (deferred for patient safety)    Coordination   Finger-nose-finger test: unable to test due to pt unable to follow comples commands with global aphasia.      Lab Results: I have personally reviewed pertinent reports.  , CBC:   Results from last 7 days   Lab Units 01/08/24  0410 01/07/24 1916   WBC Thousand/uL 2.37* 3.44*   RBC Million/uL 4.03 4.66   HEMOGLOBIN g/dL 11.9 13.5   HEMATOCRIT % 35.1 40.7   MCV fL 87 87   PLATELETS Thousands/uL 251 308   , BMP/CMP:   Results from last 7 days   Lab Units 01/08/24  0410 01/07/24  1916   SODIUM mmol/L 139 136   POTASSIUM mmol/L 4.0 3.7   CHLORIDE mmol/L 113* 107   CO2 mmol/L 20* 20*   BUN " "mg/dL 18 22   CREATININE mg/dL 1.38* 1.50*   CALCIUM mg/dL 8.6 9.3   AST U/L  --  141*   ALT U/L  --  92*   ALK PHOS U/L  --  123*   EGFR ml/min/1.73sq m 42 38   , Vitamin B12:   , HgBA1C:   , TSH:   , Coagulation:   Results from last 7 days   Lab Units 01/07/24  1916   INR  1.07   , Lipid Profile:   , Ammonia:   , Urinalysis:   Results from last 7 days   Lab Units 01/07/24  2256   COLOR UA  Light Yellow   CLARITY UA  Clear   SPEC GRAV UA  1.038*   PH UA  6.5   LEUKOCYTES UA  Small*   NITRITE UA  Negative   GLUCOSE UA mg/dl Negative   KETONES UA mg/dl Negative   BILIRUBIN UA  Negative   BLOOD UA  Small*   , Drug Screen:   , Medication Drug Levels:       Invalid input(s): \"CARBAMAZEPINE\", \"LACOSAMIDE\", \"OXCARBAZEPINE\"  Imaging Studies: Attending neurologist personally reviewed pertinent reports and personally reviewed pertinent imaging in PACS including: CT head without contrast 1/7/2024, CTA head and neck with and without contrast 1/7/2024  EKG, Pathology, and Other Studies: Attending neurologist personally reviewed pertinent reports  VTE Prophylaxis: Heparin    Code Status: Level 1 - Full Code  Advance Directive and Living Will:      Power of :    POLST:      Counseling / Coordination of Care  Please see attending attestation for amount of time spent.     This note was completed in part utilizing Dragon Software.  Grammatical errors, random word insertions, spelling mistakes, and incomplete sentences may be an occasional consequence of this system secondary to software limitations, ambient noise, and hardware issues.  If you have any questions or concerns about the content, text, or information contained within the body of this dictation, please contact the provider for clarification.         "

## 2024-01-08 NOTE — PLAN OF CARE
Problem: OCCUPATIONAL THERAPY ADULT  Goal: Performs self-care activities at highest level of function for planned discharge setting.  See evaluation for individualized goals.  Description: Treatment Interventions: ADL retraining, Functional transfer training, UE strengthening/ROM, Endurance training, Cognitive reorientation, Patient/family training, Equipment evaluation/education, Compensatory technique education, Continued evaluation, Cardiac education, Activityengagement, Energy conservation          See flowsheet documentation for full assessment, interventions and recommendations.   Note: Limitation: Decreased ADL status, Decreased UE ROM, Decreased UE strength, Decreased Safe judgement during ADL, Decreased cognition, Decreased endurance, Decreased self-care trans, Decreased high-level ADLs, Non-func L UE     Assessment: Pt is a 58 y.o. female seen for OT evaluation s/p admit to [facility] on [facility]/7/2024 w/ Stroke-like symptoms. Comorbidities affecting pt's functional performance at time of assessment include:ELIESER, anxiety, COVID-19, and subjective visual disturbances, HTN d/o, eczema, luetscher's syndrome, rosacea, and localization-related symptomatic epilepsy  . Orders placed for OT evaluation and treatment.  Performed at least two patient identifiers during session including name and wristband. Personal factors affecting pt at time of IE include:steps to enter environment, difficulty performing ADLS, difficulty performing IADLS , limited insight into deficits, compliance, financial barriers, health management , and environment. Pt was a poor historian and unable to provided answers to simple questions. Spouse reports prior to admission, pt's was Ind with ADLs, Ind with IADLs, and (+) driving.  Upon evaluation: Pt requires mod A with UB ADLs, max a with LB ADLs, bed mobility with mod A, and mod A with sit to stand. Limitations 2* the following deficits impacting occupational performance: weakness,  decreased strength, decreased dynamic sit/ stand balance, decreased activity tolerance, decreased standing tolerance time for self care and functional mobility, impaired memory, impaired sequencing, impaired problem solving, impulsivity, decreased safety awareness, impaired interpersonal skills, environmental deficits, decreased coping skills, decreased mobilty, and requiring external assistance to complete transitional movements. Pt to benefit from continued skilled OT tx while in the hospital to address deficits as defined above and maximize level of functional independence w ADL's and functional mobility. Occupational Performance areas to address include: grooming, bathing/shower, toilet hygiene, dressing, medication management, socialization, health maintenance, functional mobility, community mobility, clothing management, cleaning, meal prep, money management, household maintenance, and care of children. From OT standpoint, recommendation at time of d/c would be Level I (Max Resource Intensity).     Rehab Resource Intensity Level, OT: I (Maximum Resource Intensity)

## 2024-01-08 NOTE — ASSESSMENT & PLAN NOTE
Lab Results   Component Value Date    EGFR 38 01/07/2024    EGFR 50 12/26/2023    EGFR 56 10/30/2020    CREATININE 1.50 (H) 01/07/2024    CREATININE 1.19 12/26/2023    CREATININE 1.12 10/30/2020   Cr 1.5 on admission,  IV hydration  Avoid Hypotension  Avoid nephrotoxins  Daily BMP

## 2024-01-08 NOTE — ED PROVIDER NOTES
History  Chief Complaint   Patient presents with    CVA/TIA-like Symptoms     Pt came by EMS from home c/o stroke like symptoms beginning 6 days ago. Slurred speech, uneven gait, difficulty forming sentences. No prior Hx. Dx with UTI about a week ago.      59 yo female with 6 days of refusing to eat and trouble findings words. EMS notes slurred speech and unsteady gait which family reports started 6 days ago. Complete history is limited due to confusion.         Prior to Admission Medications   Prescriptions Last Dose Informant Patient Reported? Taking?   QUEtiapine (SEROquel) 50 mg tablet  Self Yes No   Sig: Take 50 mg by mouth daily at bedtime    Patient not taking: Reported on 2022   busPIRone (BUSPAR) 5 mg tablet   Yes No   Sig: Take 5 mg by mouth 2 (two) times a day   escitalopram (LEXAPRO) 10 mg tablet   Yes No   ibuprofen (MOTRIN) 600 mg tablet   No No   Sig: Take 1 tablet (600 mg total) by mouth every 6 (six) hours as needed for mild pain for up to 10 days   Patient not taking: Reported on 2023   lamoTRIgine (LaMICtal) 200 MG tablet   No No   Sig: Take 1 tablet (200 mg total) by mouth 2 (two) times a day   lamoTRIgine (LaMICtal) 200 MG tablet   No No   Si tablet by mouth twice per day.   medroxyPROGESTERone acetate (DEPO-PROVERA SYRINGE) 150 mg/mL injection  Self Yes No   Sig: Inject 150 mg into a muscle every 3 (three) months    ondansetron (ZOFRAN-ODT) 4 mg disintegrating tablet  Self No No   Sig: Take 1 tablet (4 mg total) by mouth every 8 (eight) hours as needed for nausea or vomiting   Patient not taking: Reported on 2020   tamsulosin (FLOMAX) 0.4 mg  Self No No   Sig: Take 1 capsule (0.4 mg total) by mouth daily with dinner   Patient not taking: Reported on 2020   topiramate (TOPAMAX) 100 mg tablet   No No   Sig: Take 1 tablet (100 mg total) by mouth 2 (two) times a day   topiramate (TOPAMAX) 100 mg tablet   No No   Sig: Take 1 tablet (100 mg total) by mouth 2 (two) times a day    venlafaxine (EFFEXOR-XR) 75 mg 24 hr capsule   Yes Yes   Sig: Take 75 mg by mouth daily      Facility-Administered Medications: None       Past Medical History:   Diagnosis Date    Hx of hypercalcemia     Lymphoma (HCC) 2021    Parathyroid disease (HCC)     Seizures (HCC)     Situational depression     24 yr old son  from drug overdose       Past Surgical History:   Procedure Laterality Date    CRANIOTOMY FOR TEMPORAL LOBECTOMY Left        Family History   Problem Relation Age of Onset    Diabetes Mother     Cancer Father      I have reviewed and agree with the history as documented.    E-Cigarette/Vaping    E-Cigarette Use Never User      E-Cigarette/Vaping Substances    Nicotine No     THC No     CBD No      Social History     Tobacco Use    Smoking status: Never    Smokeless tobacco: Never   Vaping Use    Vaping status: Never Used   Substance Use Topics    Alcohol use: No    Drug use: Never       Review of Systems   Unable to perform ROS: Mental status change       Physical Exam  Physical Exam  Vitals and nursing note reviewed.   Constitutional:       General: She is not in acute distress.     Appearance: Normal appearance. She is well-developed. She is not diaphoretic.   HENT:      Head: Normocephalic and atraumatic.      Mouth/Throat:      Mouth: Mucous membranes are dry.      Pharynx: Oropharynx is clear.   Eyes:      Conjunctiva/sclera: Conjunctivae normal.      Pupils: Pupils are equal, round, and reactive to light.   Neck:      Vascular: No JVD.   Cardiovascular:      Rate and Rhythm: Regular rhythm. Tachycardia present.      Pulses: Normal pulses.      Heart sounds: Normal heart sounds.   Pulmonary:      Effort: Pulmonary effort is normal. No respiratory distress.      Breath sounds: Normal breath sounds. No stridor.   Abdominal:      General: There is no distension.      Palpations: Abdomen is soft.      Tenderness: There is no abdominal tenderness. There is no guarding or rebound.    Musculoskeletal:         General: No tenderness or deformity. Normal range of motion.      Cervical back: Normal range of motion and neck supple. No rigidity.   Skin:     General: Skin is warm and dry.      Capillary Refill: Capillary refill takes less than 2 seconds.      Coloration: Skin is not jaundiced or pale.      Findings: No bruising, erythema or rash.   Neurological:      Mental Status: She is alert. She is disoriented.      Cranial Nerves: No cranial nerve deficit.      Sensory: No sensory deficit.      Motor: No abnormal muscle tone.      Coordination: Coordination normal.         Vital Signs  ED Triage Vitals   Temperature Pulse Respirations Blood Pressure SpO2   01/07/24 1909 01/07/24 1907 01/07/24 1907 01/07/24 1907 01/07/24 1907   99.6 °F (37.6 °C) (!) 112 20 114/73 92 %      Temp Source Heart Rate Source Patient Position - Orthostatic VS BP Location FiO2 (%)   01/07/24 1909 01/07/24 1907 01/07/24 1907 01/07/24 1907 --   Oral Monitor Lying Right arm       Pain Score       01/08/24 0701       No Pain           Vitals:    01/08/24 0330 01/08/24 0500 01/08/24 0700 01/08/24 1100   BP: 105/68 111/58 120/65 113/64   Pulse: 88 98 98 92   Patient Position - Orthostatic VS: Lying Lying Lying          Visual Acuity      ED Medications  Medications   lamoTRIgine (LaMICtal) tablet 200 mg (200 mg Oral Given 1/8/24 0949)   topiramate (TOPAMAX) tablet 100 mg (100 mg Oral Given 1/8/24 0949)   venlafaxine (EFFEXOR-XR) 24 hr capsule 75 mg (75 mg Oral Given 1/8/24 0949)   sodium chloride 0.9 % infusion (100 mL/hr Intravenous New Bag 1/8/24 0405)   acetaminophen (TYLENOL) tablet 650 mg (has no administration in time range)   docusate sodium (COLACE) capsule 100 mg (100 mg Oral Given 1/8/24 0949)   ondansetron (ZOFRAN) injection 4 mg (has no administration in time range)   heparin (porcine) subcutaneous injection 5,000 Units (5,000 Units Subcutaneous Not Given 1/8/24 0403)   sodium chloride 0.9 % bolus 1,000 mL (0 mL  Intravenous Stopped 1/7/24 2043)   iohexol (OMNIPAQUE) 350 MG/ML injection (MULTI-DOSE) 85 mL (85 mL Intravenous Given 1/7/24 2109)       Diagnostic Studies  Results Reviewed       Procedure Component Value Units Date/Time    Basic metabolic panel [[id number]]  (Abnormal) Collected: 01/08/24 0410    Lab Status: Final result Specimen: Blood from Arm, Left Updated: 01/08/24 0434     Sodium 139 mmol/L      Potassium 4.0 mmol/L      Chloride 113 mmol/L      CO2 20 mmol/L      ANION GAP 6 mmol/L      BUN 18 mg/dL      Creatinine 1.38 mg/dL      Glucose 109 mg/dL      Calcium 8.6 mg/dL      eGFR 42 ml/min/1.73sq m     Narrative:      National Kidney Disease Foundation guidelines for Chronic Kidney Disease (CKD):     Stage 1 with normal or high GFR (GFR > 90 mL/min/1.73 square meters)    Stage 2 Mild CKD (GFR = 60-89 mL/min/1.73 square meters)    Stage 3A Moderate CKD (GFR = 45-59 mL/min/1.73 square meters)    Stage 3B Moderate CKD (GFR = 30-44 mL/min/1.73 square meters)    Stage 4 Severe CKD (GFR = 15-29 mL/min/1.73 square meters)    Stage 5 End Stage CKD (GFR <15 mL/min/1.73 square meters)  Note: GFR calculation is accurate only with a steady state creatinine    CBC (With Platelets) [[id number]]  (Abnormal) Collected: 01/08/24 0410    Lab Status: Final result Specimen: Blood from Arm, Left Updated: 01/08/24 0417     WBC 2.37 Thousand/uL      RBC 4.03 Million/uL      Hemoglobin 11.9 g/dL      Hematocrit 35.1 %      MCV 87 fL      MCH 29.5 pg      MCHC 33.9 g/dL      RDW 13.3 %      Platelets 251 Thousands/uL      MPV 9.5 fL     Blood culture #1 [[id number]] Collected: 01/07/24 1910    Lab Status: Preliminary result Specimen: Blood from Arm, Left Updated: 01/08/24 0102     Blood Culture Received in Microbiology Lab. Culture in Progress.    Blood culture #2 [[id number]] Collected: 01/07/24 1916    Lab Status: Preliminary result Specimen: Blood from Arm, Left Updated: 01/08/24 0102     Blood Culture Received in Microbiology Lab.  Culture in Progress.    Urine Microscopic [[id number]]  (Abnormal) Collected: 01/07/24 2256    Lab Status: Final result Specimen: Urine, Clean Catch Updated: 01/07/24 2308     RBC, UA 2-4 /hpf      WBC, UA 4-10 /hpf      Epithelial Cells Occasional /hpf      Bacteria, UA None Seen /hpf     UA (URINE) with reflex to Scope [[id number]]  (Abnormal) Collected: 01/07/24 2256    Lab Status: Final result Specimen: Urine, Clean Catch Updated: 01/07/24 2307     Color, UA Light Yellow     Clarity, UA Clear     Specific Gravity, UA 1.038     pH, UA 6.5     Leukocytes, UA Small     Nitrite, UA Negative     Protein, UA Trace mg/dl      Glucose, UA Negative mg/dl      Ketones, UA Negative mg/dl      Urobilinogen, UA <2.0 mg/dl      Bilirubin, UA Negative     Occult Blood, UA Small    Urine culture [[id number]] Collected: 01/07/24 2256    Lab Status: In process Specimen: Urine, Clean Catch Updated: 01/07/24 2301    FLU/RSV/COVID - if FLU/RSV clinically relevant [[id number]]  (Abnormal) Collected: 01/07/24 1916    Lab Status: Final result Specimen: Nares from Nose Updated: 01/07/24 2005     SARS-CoV-2 Positive     INFLUENZA A PCR Negative     INFLUENZA B PCR Negative     RSV PCR Negative    Narrative:      FOR PEDIATRIC PATIENTS - copy/paste COVID Guidelines URL to browser: [email or web address]    SARS-CoV-2 assay is a Nucleic Acid Amplification assay intended for the  qualitative detection of nucleic acid from SARS-CoV-2 in nasopharyngeal  swabs. Results are for the presumptive identification of SARS-CoV-2 RNA.    Positive results are indicative of infection with SARS-CoV-2, the virus  causing COVID-19, but do not rule out bacterial infection or co-infection  with other viruses. Laboratories within the [place] and its  territories are required to report all positive results to the appropriate  public health authorities. Negative results do not preclude SARS-CoV-2  infection  and should not be used as the sole basis for treatment or other  patient management decisions. Negative results must be combined with  clinical observations, patient history, and epidemiological information.  This test has not been FDA cleared or approved.    This test has been authorized by FDA under an Emergency Use Authorization  (EUA). This test is only authorized for the duration of time the  declaration that circumstances exist justifying the authorization of the  emergency use of an in vitro diagnostic tests for detection of SARS-CoV-2  virus and/or diagnosis of COVID-19 infection under section 564(b)(1) of  the Act, 21 U.S.C. 360bbb-3(b)(1), unless the authorization is terminated  or revoked sooner. The test has been validated but independent review by FDA  and CLIA is pending.    Test performed using [employer]: This RT-PCR assay targets N2,  a region unique to SARS-CoV-2. A conserved region in the E-gene was chosen  for pan-Sarbecovirus detection which includes SARS-CoV-2.    According to CMS-2020-01-R, this platform meets the definition of high-throughput technology.    HS Troponin 0hr (reflex protocol) [[id number]]  (Normal) Collected: 01/07/24 1916    Lab Status: Final result Specimen: Blood from Arm, Left Updated: 01/07/24 1952     hs TnI 0hr 12 ng/L     Basic metabolic panel [[id number]]  (Abnormal) Collected: 01/07/24 1916    Lab Status: Final result Specimen: Blood from Arm, Left Updated: 01/07/24 1950     Sodium 136 mmol/L      Potassium 3.7 mmol/L      Chloride 107 mmol/L      CO2 20 mmol/L      ANION GAP 9 mmol/L      BUN 22 mg/dL      Creatinine 1.50 mg/dL      Glucose 131 mg/dL      Calcium 9.3 mg/dL      eGFR 38 ml/min/1.73sq m     Narrative:      National Kidney Disease Foundation guidelines for Chronic Kidney Disease (CKD):     Stage 1 with normal or high GFR (GFR > 90 mL/min/1.73 square meters)    Stage 2 Mild CKD (GFR = 60-89 mL/min/1.73 square meters)    Stage 3A Moderate CKD (GFR =  45-59 mL/min/1.73 square meters)    Stage 3B Moderate CKD (GFR = 30-44 mL/min/1.73 square meters)    Stage 4 Severe CKD (GFR = 15-29 mL/min/1.73 square meters)    Stage 5 End Stage CKD (GFR <15 mL/min/1.73 square meters)  Note: GFR calculation is accurate only with a steady state creatinine    Hepatic function panel [[id number]]  (Abnormal) Collected: 01/07/24 1916    Lab Status: Final result Specimen: Blood from Arm, Left Updated: 01/07/24 1950     Total Bilirubin 0.60 mg/dL      Bilirubin, Direct 0.23 mg/dL      Alkaline Phosphatase 123 U/L       U/L      ALT 92 U/L      Total Protein 6.5 g/dL      Albumin 3.7 g/dL     Lactic acid, plasma (w/reflex if result > 2.0) [[id number]]  (Normal) Collected: 01/07/24 1916    Lab Status: Final result Specimen: Blood from Arm, Left Updated: 01/07/24 1948     LACTIC ACID 1.8 mmol/L     Narrative:      Result may be elevated if tourniquet was used during collection.    Protime-INR [[id number]]  (Abnormal) Collected: 01/07/24 1916    Lab Status: Final result Specimen: Blood from Arm, Left Updated: 01/07/24 1945     Protime 14.6 seconds      INR 1.07    APTT [[id number]]  (Abnormal) Collected: 01/07/24 1916    Lab Status: Final result Specimen: Blood from Arm, Left Updated: 01/07/24 1945     PTT 38 seconds     CBC and differential [[id number]]  (Abnormal) Collected: 01/07/24 1916    Lab Status: Final result Specimen: Blood from Arm, Left Updated: 01/07/24 1926     WBC 3.44 Thousand/uL      RBC 4.66 Million/uL      Hemoglobin 13.5 g/dL      Hematocrit 40.7 %      MCV 87 fL      MCH 29.0 pg      MCHC 33.2 g/dL      RDW 13.3 %      MPV 9.4 fL      Platelets 308 Thousands/uL      nRBC 0 /100 WBCs      Neutrophils Relative 87 %      Immat GRANS % 2 %      Lymphocytes Relative 4 %      Monocytes Relative 7 %      Eosinophils Relative 0 %      Basophils Relative 0 %      Neutrophils Absolute 2.99 Thousands/µL      Immature Grans Absolute 0.05 Thousand/uL      Lymphocytes Absolute 0.14  "Thousands/µL      Monocytes Absolute 0.24 Thousand/µL      Eosinophils Absolute 0.01 Thousand/µL      Basophils Absolute 0.01 Thousands/µL     Fingerstick Glucose (POCT) [[id number]]  (Normal) Collected: 01/07/24 1904    Lab Status: Final result Updated: 01/07/24 1910     POC Glucose 138 mg/dl                    CTA head and neck with and without contrast   Final Result by Edward Goodman MD (01/08 0736)      No acute arterial vascular abnormality in the head or neck. No flow-limiting large vessel arterial vascular stenosis in the head or neck.      Tiny punctate foci of relative increased parenchymal density in the frontal lobes bilaterally, unchanged from 2 hours earlier and probably representing low-density parenchymal calcifications rather than parenchymal hemorrhage. Conservative management    with an additional follow-up noncontrast head CT is recommended in 24 to 48 hours.      Reidentified left temporal lobe resection. Dense basal ganglia and cerebellar calcifications again noted.      Groundglass and consolidative airspace opacities most suspicious for extensive pneumonia seen throughout the visualized mid and upper lung zones more dense on the right than on the left.      Findings are consistent with the preliminary report from [facility] which was provided shortly after completion of the exam. This examination was also marked \"immediate notification\" in Epic in order to begin the standard process by which the    radiology reading room liaison alerts the referring practitioner.               Workstation performed: [id number]         XR chest 1 view portable   Final Result by Jerardo Agrawal MD (01/08 0852)      Multifocal pneumonia and/or edema.                  Workstation performed: [id number]         CT head without contrast   Final Result by Edgardo Santoyo MD (01/07 2039)      Punctate hyperdense foci noted at both frontal region suspicious for tiny foci of acute subarachnoid " hemorrhage. No prior studies are available for comparison. Short-term follow-up CT brain in 6 to 12 hours and/or MRI brain is recommended for further    evaluation.      Stable left temporal lobe encephalomalacia, compatible with the patient's history of prior left temporal lobectomy.               I personally discussed this study with LUISITO SALDAÑA on 1/7/2024 8:31 PM.               Workstation performed: [id number]         MRI inpatient order    (Results Pending)              Procedures  ECG 12 Lead Documentation Only    Date/Time: 1/7/2024 7:09 PM    Performed by: Luisito Saldaña MD  Authorized by: Luisito Saldaña MD    Indications / Diagnosis:  Confusion  ECG reviewed by me, the ED Provider: yes    Patient location:  ED  Previous ECG:     Previous ECG:  Compared to current    Comparison ECG info:  26 dec 2023    Similarity:  No change  Interpretation:     Interpretation: normal    Rate:     ECG rate:  113    ECG rate assessment: tachycardic    Rhythm:     Rhythm: sinus tachycardia             ED Course  ED Course as of 01/08/24 1128   Sun Jan 07, 2024 2054 Spoke with Dr Brooks who recommends CTA. If no vascular abnormality pt can be admitted to [facility].                                SBIRT 22yo+      Flowsheet Row Most Recent Value   Initial Alcohol Screen: US AUDIT-C     1. How often do you have a drink containing alcohol? 0 Filed at: 01/07/2024 1908   2. How many drinks containing alcohol do you have on a typical day you are drinking?  0 Filed at: 01/07/2024 1908   3b. FEMALE Any Age, or MALE 65+: How often do you have 4 or more drinks on one occassion? 0 Filed at: 01/07/2024 1908   Audit-C Score 0 Filed at: 01/07/2024 1908   ALONSO: How many times in the past year have you...    Used an illegal drug or used a prescription medication for non-medical reasons? Never Filed at: 01/07/2024 1908                      Medical Decision Making  Problems Addressed:  Abnormal CT of the head: complicated acute illness or  injury that poses a threat to life or bodily functions  COVID-19: complicated acute illness or injury with systemic symptoms that poses a threat to life or bodily functions    Amount and/or Complexity of Data Reviewed  Labs: ordered.  Radiology: ordered and independent interpretation performed.    Risk  Prescription drug management.  Decision regarding hospitalization.             Disposition  Final diagnoses:   Abnormal CT of the head   COVID-19     Time reflects when diagnosis was documented in both MDM as applicable and the Disposition within this note       Time User Action Codes Description Comment    1/7/2024  8:49 PM Luisito Saldaña Add [R93.0] Abnormal CT of the head     1/8/2024  2:31 AM Farhan Juarez [U07.1] COVID-19           ED Disposition       ED Disposition   Admit    Condition   Stable    Date/Time   Mon Jan 8, 2024 0231    Comment   Case was discussed with AMALIA and the patient's admission status was agreed to be Admission Status: inpatient status to the service of Dr. French               Follow-up Information    None         Patient's Medications   Discharge Prescriptions    No medications on file       No discharge procedures on file.    PDMP Review         Value Time User    PDMP Reviewed  Yes 1/8/2024  1:33 AM DANYELLE Spann            ED Provider  Electronically Signed by             Luisito Saldaña MD  01/08/24 1128

## 2024-01-08 NOTE — QUICK NOTE
Contacted by nursing at 12:32 PM that pt is restless, pulling at leads, pulled out 2 Ivs and is gazing ahead of nurse when nurse is speaking to her. Change in mental status compared to this morning per nursing. Dr. Jacobs discussed case with Dr. Bae (). In discussion, aphasia seems to be fluctuating throughout the day. Now, with agitation, concern for status. Case discussed with EMU fellow at [facility]. Pt approved for vEEG monitoring at [facility]. Discussed with internal medicine attending, Dr. Bae, recommendation for pt to be transferred to [facility] for vEEG monitoring. Recommended to administer dose of Ativan to see if improvement. Attending neurologist aware and decision maker in above recommended plan. Pt's  updated on phone with updated plan by this AP.

## 2024-01-09 ENCOUNTER — APPOINTMENT (INPATIENT)
Dept: RADIOLOGY | Facility: HOSPITAL | Age: 59
DRG: 871 | End: 2024-01-09
Payer: COMMERCIAL

## 2024-01-09 ENCOUNTER — APPOINTMENT (INPATIENT)
Dept: NON INVASIVE DIAGNOSTICS | Facility: HOSPITAL | Age: 59
DRG: 871 | End: 2024-01-09
Payer: COMMERCIAL

## 2024-01-09 ENCOUNTER — TELEPHONE (OUTPATIENT)
Dept: NEUROLOGY | Facility: CLINIC | Age: 59
End: 2024-01-09

## 2024-01-09 PROBLEM — G93.40 ENCEPHALOPATHY ACUTE: Status: ACTIVE | Noted: 2024-01-09

## 2024-01-09 PROBLEM — J96.01 ACUTE RESPIRATORY FAILURE WITH HYPOXIA (HCC): Status: ACTIVE | Noted: 2024-01-09

## 2024-01-09 PROBLEM — R74.01 TRANSAMINITIS: Status: ACTIVE | Noted: 2024-01-09

## 2024-01-09 PROBLEM — A41.9 SEPSIS (HCC): Status: ACTIVE | Noted: 2024-01-09

## 2024-01-09 PROBLEM — C85.90 NON-HODGKIN'S LYMPHOMA (HCC): Status: RESOLVED | Noted: 2022-04-19 | Resolved: 2024-01-09

## 2024-01-09 PROBLEM — C83.00: Status: ACTIVE | Noted: 2024-01-09

## 2024-01-09 LAB
ABO GROUP BLD: NORMAL
ALBUMIN SERPL BCP-MCNC: 3.3 G/DL (ref 3.5–5)
ALP SERPL-CCNC: 99 U/L (ref 34–104)
ALT SERPL W P-5'-P-CCNC: 89 U/L (ref 7–52)
ANION GAP SERPL CALCULATED.3IONS-SCNC: 7 MMOL/L
ANION GAP SERPL CALCULATED.3IONS-SCNC: 8 MMOL/L
AST SERPL W P-5'-P-CCNC: 135 U/L (ref 13–39)
BASOPHILS # BLD AUTO: 0 THOUSANDS/ÂΜL (ref 0–0.1)
BASOPHILS NFR BLD AUTO: 0 % (ref 0–1)
BILIRUB SERPL-MCNC: 0.7 MG/DL (ref 0.2–1)
BUN SERPL-MCNC: 18 MG/DL (ref 5–25)
BUN SERPL-MCNC: 20 MG/DL (ref 5–25)
CALCIUM ALBUM COR SERPL-MCNC: 9.4 MG/DL (ref 8.3–10.1)
CALCIUM SERPL-MCNC: 8.8 MG/DL (ref 8.4–10.2)
CALCIUM SERPL-MCNC: 9.1 MG/DL (ref 8.4–10.2)
CHLORIDE SERPL-SCNC: 117 MMOL/L (ref 96–108)
CHLORIDE SERPL-SCNC: 120 MMOL/L (ref 96–108)
CHOLEST SERPL-MCNC: 118 MG/DL
CK SERPL-CCNC: 314 U/L (ref 26–192)
CO2 SERPL-SCNC: 18 MMOL/L (ref 21–32)
CO2 SERPL-SCNC: 19 MMOL/L (ref 21–32)
CREAT SERPL-MCNC: 1.29 MG/DL (ref 0.6–1.3)
CREAT SERPL-MCNC: 1.31 MG/DL (ref 0.6–1.3)
CRP SERPL QL: 88.6 MG/L
D DIMER PPP FEU-MCNC: 1.98 UG/ML FEU
EOSINOPHIL # BLD AUTO: 0 THOUSAND/ÂΜL (ref 0–0.61)
EOSINOPHIL NFR BLD AUTO: 0 % (ref 0–6)
ERYTHROCYTE [DISTWIDTH] IN BLOOD BY AUTOMATED COUNT: 13.3 % (ref 11.6–15.1)
ERYTHROCYTE [DISTWIDTH] IN BLOOD BY AUTOMATED COUNT: 13.5 % (ref 11.6–15.1)
EST. AVERAGE GLUCOSE BLD GHB EST-MCNC: 143 MG/DL
GFR SERPL CREATININE-BSD FRML MDRD: 45 ML/MIN/1.73SQ M
GFR SERPL CREATININE-BSD FRML MDRD: 46 ML/MIN/1.73SQ M
GLUCOSE SERPL-MCNC: 102 MG/DL (ref 65–140)
GLUCOSE SERPL-MCNC: 107 MG/DL (ref 65–140)
HBA1C MFR BLD: 6.6 %
HCT VFR BLD AUTO: 35 % (ref 34.8–46.1)
HCT VFR BLD AUTO: 38.1 % (ref 34.8–46.1)
HDLC SERPL-MCNC: 40 MG/DL
HGB BLD-MCNC: 11.4 G/DL (ref 11.5–15.4)
HGB BLD-MCNC: 12.5 G/DL (ref 11.5–15.4)
HIV 1+2 AB+HIV1 P24 AG SERPL QL IA: NORMAL
HIV1 P24 AG SER QL: NORMAL
IMM GRANULOCYTES # BLD AUTO: 0.04 THOUSAND/UL (ref 0–0.2)
IMM GRANULOCYTES NFR BLD AUTO: 2 % (ref 0–2)
LACTATE SERPL-SCNC: 0.9 MMOL/L (ref 0.5–2)
LACTATE SERPL-SCNC: 1 MMOL/L (ref 0.5–2)
LDLC SERPL CALC-MCNC: 59 MG/DL (ref 0–100)
LYMPHOCYTES # BLD AUTO: 0.11 THOUSANDS/ÂΜL (ref 0.6–4.47)
LYMPHOCYTES NFR BLD AUTO: 5 % (ref 14–44)
MAGNESIUM SERPL-MCNC: 2 MG/DL (ref 1.9–2.7)
MCH RBC QN AUTO: 28.8 PG (ref 26.8–34.3)
MCH RBC QN AUTO: 28.9 PG (ref 26.8–34.3)
MCHC RBC AUTO-ENTMCNC: 32.6 G/DL (ref 31.4–37.4)
MCHC RBC AUTO-ENTMCNC: 32.8 G/DL (ref 31.4–37.4)
MCV RBC AUTO: 88 FL (ref 82–98)
MCV RBC AUTO: 89 FL (ref 82–98)
MONOCYTES # BLD AUTO: 0.13 THOUSAND/ÂΜL (ref 0.17–1.22)
MONOCYTES NFR BLD AUTO: 6 % (ref 4–12)
NEUTROPHILS # BLD AUTO: 1.98 THOUSANDS/ÂΜL (ref 1.85–7.62)
NEUTS SEG NFR BLD AUTO: 87 % (ref 43–75)
NRBC BLD AUTO-RTO: 0 /100 WBCS
PLATELET # BLD AUTO: 264 THOUSANDS/UL (ref 149–390)
PLATELET # BLD AUTO: 291 THOUSANDS/UL (ref 149–390)
PMV BLD AUTO: 9.4 FL (ref 8.9–12.7)
PMV BLD AUTO: 9.6 FL (ref 8.9–12.7)
POTASSIUM SERPL-SCNC: 3.6 MMOL/L (ref 3.5–5.3)
POTASSIUM SERPL-SCNC: 4.1 MMOL/L (ref 3.5–5.3)
PROCALCITONIN SERPL-MCNC: 0.63 NG/ML
PROCALCITONIN SERPL-MCNC: 0.66 NG/ML
PROT SERPL-MCNC: 5.7 G/DL (ref 6.4–8.4)
RBC # BLD AUTO: 3.95 MILLION/UL (ref 3.81–5.12)
RBC # BLD AUTO: 4.34 MILLION/UL (ref 3.81–5.12)
RH BLD: NEGATIVE
SODIUM SERPL-SCNC: 143 MMOL/L (ref 135–147)
SODIUM SERPL-SCNC: 146 MMOL/L (ref 135–147)
TRIGL SERPL-MCNC: 96 MG/DL
WBC # BLD AUTO: 2.26 THOUSAND/UL (ref 4.31–10.16)
WBC # BLD AUTO: 3.01 THOUSAND/UL (ref 4.31–10.16)

## 2024-01-09 PROCEDURE — 85027 COMPLETE CBC AUTOMATED: CPT

## 2024-01-09 PROCEDURE — 83605 ASSAY OF LACTIC ACID: CPT

## 2024-01-09 PROCEDURE — 80053 COMPREHEN METABOLIC PANEL: CPT

## 2024-01-09 PROCEDURE — 94760 N-INVAS EAR/PLS OXIMETRY 1: CPT

## 2024-01-09 PROCEDURE — 71045 X-RAY EXAM CHEST 1 VIEW: CPT

## 2024-01-09 PROCEDURE — 99223 1ST HOSP IP/OBS HIGH 75: CPT | Performed by: STUDENT IN AN ORGANIZED HEALTH CARE EDUCATION/TRAINING PROGRAM

## 2024-01-09 PROCEDURE — 86704 HEP B CORE ANTIBODY TOTAL: CPT

## 2024-01-09 PROCEDURE — C9254 INJECTION, LACOSAMIDE: HCPCS | Performed by: PHYSICIAN ASSISTANT

## 2024-01-09 PROCEDURE — 87081 CULTURE SCREEN ONLY: CPT

## 2024-01-09 PROCEDURE — 82550 ASSAY OF CK (CPK): CPT

## 2024-01-09 PROCEDURE — 85025 COMPLETE CBC W/AUTO DIFF WBC: CPT

## 2024-01-09 PROCEDURE — 80048 BASIC METABOLIC PNL TOTAL CA: CPT

## 2024-01-09 PROCEDURE — 99232 SBSQ HOSP IP/OBS MODERATE 35: CPT | Performed by: INTERNAL MEDICINE

## 2024-01-09 PROCEDURE — 83036 HEMOGLOBIN GLYCOSYLATED A1C: CPT | Performed by: PHYSICIAN ASSISTANT

## 2024-01-09 PROCEDURE — 86705 HEP B CORE ANTIBODY IGM: CPT

## 2024-01-09 PROCEDURE — 86803 HEPATITIS C AB TEST: CPT

## 2024-01-09 PROCEDURE — 86900 BLOOD TYPING SEROLOGIC ABO: CPT

## 2024-01-09 PROCEDURE — 84145 PROCALCITONIN (PCT): CPT

## 2024-01-09 PROCEDURE — 87340 HEPATITIS B SURFACE AG IA: CPT

## 2024-01-09 PROCEDURE — 99215 OFFICE O/P EST HI 40 MIN: CPT | Performed by: PSYCHIATRY & NEUROLOGY

## 2024-01-09 PROCEDURE — 80061 LIPID PANEL: CPT | Performed by: PHYSICIAN ASSISTANT

## 2024-01-09 PROCEDURE — 87040 BLOOD CULTURE FOR BACTERIA: CPT

## 2024-01-09 PROCEDURE — 86901 BLOOD TYPING SEROLOGIC RH(D): CPT

## 2024-01-09 PROCEDURE — 85379 FIBRIN DEGRADATION QUANT: CPT

## 2024-01-09 PROCEDURE — 86140 C-REACTIVE PROTEIN: CPT

## 2024-01-09 PROCEDURE — 83735 ASSAY OF MAGNESIUM: CPT

## 2024-01-09 PROCEDURE — 87806 HIV AG W/HIV1&2 ANTB W/OPTIC: CPT

## 2024-01-09 RX ORDER — ENOXAPARIN SODIUM 100 MG/ML
1 INJECTION SUBCUTANEOUS EVERY 12 HOURS SCHEDULED
Status: DISCONTINUED | OUTPATIENT
Start: 2024-01-09 | End: 2024-01-10

## 2024-01-09 RX ORDER — CEFTRIAXONE 1 G/50ML
1000 INJECTION, SOLUTION INTRAVENOUS EVERY 24 HOURS
Status: DISCONTINUED | OUTPATIENT
Start: 2024-01-09 | End: 2024-01-10

## 2024-01-09 RX ORDER — ATORVASTATIN CALCIUM 10 MG/1
20 TABLET, FILM COATED ORAL
Status: DISCONTINUED | OUTPATIENT
Start: 2024-01-09 | End: 2024-01-10 | Stop reason: HOSPADM

## 2024-01-09 RX ORDER — LORAZEPAM 2 MG/ML
2 INJECTION INTRAMUSCULAR EVERY 4 HOURS PRN
Status: DISCONTINUED | OUTPATIENT
Start: 2024-01-09 | End: 2024-01-09

## 2024-01-09 RX ORDER — DEXAMETHASONE SODIUM PHOSPHATE 10 MG/ML
6 INJECTION, SOLUTION INTRAMUSCULAR; INTRAVENOUS DAILY
Status: DISCONTINUED | OUTPATIENT
Start: 2024-01-09 | End: 2024-01-10 | Stop reason: HOSPADM

## 2024-01-09 RX ORDER — AMOXICILLIN 250 MG
2 CAPSULE ORAL 2 TIMES DAILY
Status: DISCONTINUED | OUTPATIENT
Start: 2024-01-09 | End: 2024-01-10 | Stop reason: HOSPADM

## 2024-01-09 RX ORDER — ASPIRIN 300 MG/1
300 SUPPOSITORY RECTAL ONCE
Status: COMPLETED | OUTPATIENT
Start: 2024-01-09 | End: 2024-01-09

## 2024-01-09 RX ORDER — POLYETHYLENE GLYCOL 3350 17 G/17G
17 POWDER, FOR SOLUTION ORAL DAILY
Status: DISCONTINUED | OUTPATIENT
Start: 2024-01-10 | End: 2024-01-10 | Stop reason: HOSPADM

## 2024-01-09 RX ORDER — LORAZEPAM 2 MG/ML
1 INJECTION INTRAMUSCULAR ONCE
Status: COMPLETED | OUTPATIENT
Start: 2024-01-09 | End: 2024-01-09

## 2024-01-09 RX ORDER — ACETAMINOPHEN 325 MG/1
650 TABLET ORAL EVERY 6 HOURS PRN
Status: DISCONTINUED | OUTPATIENT
Start: 2024-01-09 | End: 2024-01-10 | Stop reason: HOSPADM

## 2024-01-09 RX ORDER — HALOPERIDOL 5 MG/ML
2 INJECTION INTRAMUSCULAR ONCE
Status: COMPLETED | OUTPATIENT
Start: 2024-01-09 | End: 2024-01-09

## 2024-01-09 RX ORDER — ACETAMINOPHEN 650 MG/1
650 SUPPOSITORY RECTAL ONCE
Status: COMPLETED | OUTPATIENT
Start: 2024-01-09 | End: 2024-01-09

## 2024-01-09 RX ORDER — CHLORHEXIDINE GLUCONATE ORAL RINSE 1.2 MG/ML
15 SOLUTION DENTAL EVERY 12 HOURS SCHEDULED
Status: DISCONTINUED | OUTPATIENT
Start: 2024-01-09 | End: 2024-01-10 | Stop reason: HOSPADM

## 2024-01-09 RX ADMIN — TOPIRAMATE 150 MG: 100 TABLET, FILM COATED ORAL at 18:10

## 2024-01-09 RX ADMIN — ACETAMINOPHEN 650 MG: 650 SUPPOSITORY RECTAL at 03:39

## 2024-01-09 RX ADMIN — TOCILIZUMAB 600 MG: 20 INJECTION, SOLUTION, CONCENTRATE INTRAVENOUS at 21:54

## 2024-01-09 RX ADMIN — ACETAMINOPHEN 650 MG: 325 TABLET, FILM COATED ORAL at 12:34

## 2024-01-09 RX ADMIN — TOPIRAMATE 150 MG: 100 TABLET, FILM COATED ORAL at 12:22

## 2024-01-09 RX ADMIN — SENNOSIDES AND DOCUSATE SODIUM 2 TABLET: 50; 8.6 TABLET ORAL at 20:54

## 2024-01-09 RX ADMIN — LORAZEPAM 1 MG: 2 INJECTION INTRAMUSCULAR; INTRAVENOUS at 18:07

## 2024-01-09 RX ADMIN — DEXAMETHASONE SODIUM PHOSPHATE 6 MG: 10 INJECTION, SOLUTION INTRAMUSCULAR; INTRAVENOUS at 20:42

## 2024-01-09 RX ADMIN — ASPIRIN 300 MG: 300 SUPPOSITORY RECTAL at 18:20

## 2024-01-09 RX ADMIN — HEPARIN SODIUM 5000 UNITS: 5000 INJECTION INTRAVENOUS; SUBCUTANEOUS at 15:48

## 2024-01-09 RX ADMIN — CHLORHEXIDINE GLUCONATE 0.12% ORAL RINSE 15 ML: 1.2 LIQUID ORAL at 20:42

## 2024-01-09 RX ADMIN — LORAZEPAM 1 MG: 2 INJECTION INTRAMUSCULAR; INTRAVENOUS at 13:52

## 2024-01-09 RX ADMIN — LORAZEPAM 1 MG: 2 INJECTION INTRAMUSCULAR; INTRAVENOUS at 07:36

## 2024-01-09 RX ADMIN — REMDESIVIR 200 MG: 100 INJECTION, POWDER, LYOPHILIZED, FOR SOLUTION INTRAVENOUS at 20:41

## 2024-01-09 RX ADMIN — LAMOTRIGINE 200 MG: 100 TABLET ORAL at 12:22

## 2024-01-09 RX ADMIN — SODIUM CHLORIDE 500 ML: 0.9 INJECTION, SOLUTION INTRAVENOUS at 03:40

## 2024-01-09 RX ADMIN — CEFTRIAXONE 1000 MG: 1 INJECTION, SOLUTION INTRAVENOUS at 05:28

## 2024-01-09 RX ADMIN — LAMOTRIGINE 200 MG: 100 TABLET ORAL at 18:10

## 2024-01-09 RX ADMIN — ENOXAPARIN SODIUM 80 MG: 80 INJECTION SUBCUTANEOUS at 20:51

## 2024-01-09 RX ADMIN — ATORVASTATIN CALCIUM 20 MG: 20 TABLET, FILM COATED ORAL at 18:23

## 2024-01-09 RX ADMIN — HALOPERIDOL LACTATE 2 MG: 5 INJECTION, SOLUTION INTRAMUSCULAR at 12:07

## 2024-01-09 RX ADMIN — LACOSAMIDE 400 MG: 10 INJECTION INTRAVENOUS at 10:22

## 2024-01-09 RX ADMIN — SODIUM CHLORIDE 100 ML/HR: 0.9 INJECTION, SOLUTION INTRAVENOUS at 13:51

## 2024-01-09 RX ADMIN — VANCOMYCIN HYDROCHLORIDE 1250 MG: 5 INJECTION, POWDER, LYOPHILIZED, FOR SOLUTION INTRAVENOUS at 23:06

## 2024-01-09 NOTE — SPEECH THERAPY NOTE
Speech Language/Pathology Missed Visit Note    Dysphagia evaluation orders received and appreciated.  Evaluation attempted, however pt's AMS precludes safe PO trials at this time.  NG tube in place.  Evaluation deferred.  Will re-attempt as patient status and scheduling permits.      Kaylah Mejias MS, CCC-SLP  Speech-Language Pathologist  [place] #[id number]  [place] #[id number]

## 2024-01-09 NOTE — ED NOTES
This RN contacted admitting provider John at this time. This rn updated provider on dimishing neuro status and asking provider to come see patient., and speak with daughter. Provider to come see patient.     Cathryn Silverio RN  01/09/24 7777

## 2024-01-09 NOTE — ASSESSMENT & PLAN NOTE
"  Neurodiagnostics:   - MRI brain wo contrast 1/9/24:   \"Tiny focus of abnormal diffusion signal within the right basal ganglia involving the anterior limb of the internal capsule not clearly restricted on the ADC maps but suspicious for small acute lacunar infarct.\"    Plan  - Stroke pathway  MRI brain wo contrast completed, see above   Recommend repeat MRI wo contrast in 4-6 weeks to confirm if the above diffusion signal was truly a stroke or shine through   Echo pending   Labs  Lipid Panel: Total cholesterol 118, LDL 59  Hemoglobin A1c 6.6    mg IL due to pt NPO as of 1/9/24 AM. Aspirin 81 mg daily starting 1/10/24   NG tube placed 1/9/24  Atorvastatin 20 mg daily   Goal normotension   Euglycemic, normothermic goal  Continue telemetry  PT/OT/ST  Stroke education  Frequent neuro checks. Continue to monitor and notify neurology with any changes.  STAT CT head for any acute change in neuro exam  - Medical management and supportive care per primary team. Correction of any metabolic or infectious disturbances.         "

## 2024-01-09 NOTE — ED NOTES
Pt sleeping. Resp unlabored. Daughter bedside. Pt awaiting MRI and transfer to [facility]     Rafael Cantor RN  01/08/24 1941

## 2024-01-09 NOTE — ASSESSMENT & PLAN NOTE
Admitted with covid diagnosis  Initially admitted on RA, O2 requirements slowly increasing  Now on 15L MFNC    On CTA H/N, imaged lungs note - groundglass and consolidative airspace opacities most suspicious for extensive pneumonia seen throughout the visualized mid and upper lung zones more dense on the right than on the left    Plan:  Titrate oxygen to maintain SpO2 >90%

## 2024-01-09 NOTE — ED NOTES
This rn taking report at this time. No update on status of transfer. This rn called pacs and spoke to June. Reports there are still no beds available at [facility] so the patient won't be getting transferred tonight. Charge Rn made aware.      Cathryn Silverio RN  01/09/24 0670

## 2024-01-09 NOTE — CONSULTS
"[facility]  Consult  Name: Carla Hunt 58 y.o. female I MRN: [id number]  Unit/Bed#: -01 I Date of Admission: 1/7/2024   Date of Service: 1/9/2024 I Hospital Day: 1    Consults    Assessment/Plan   Acute respiratory failure with hypoxia (HCC)  Assessment & Plan  Admitted with covid diagnosis  Initially admitted on RA, O2 requirements slowly increasing  Now on 15L MFNC    On CTA H/N, imaged lungs note - groundglass and consolidative airspace opacities most suspicious for extensive pneumonia seen throughout the visualized mid and upper lung zones more dense on the right than on the left    Plan:  Titrate oxygen to maintain SpO2 >90%     Sepsis (HCC)  Assessment & Plan  Patient is covid positive  AEB tachycardia, febrile, leukopenia   Blood cultures negative from 1/7  UA negative    Given change in neuro status, fevers, tachycardia. Patient may benefit from lumbar puncture    Plan:  Continue infectious work up  Repeat blood cx  CBC, lactate, procal now  Broaden antibiotics  Consider LP    Abnormal CT of the head  Assessment & Plan  1/7 CTA HN on admission - \"Tiny punctate foci of relative increased parenchymal density in the frontal lobes bilaterally, unchanged from 2 hours earlier and probably representing low-density parenchymal calcifications rather than parenchymal hemorrhage. Conservative management with an additional follow-up noncontrast head CT is recommended in 24 to 48 hours.\"    Plan:  Will discuss with neurology regarding repeat imaging    Acute-on-chronic kidney injury   Assessment & Plan  Lab Results   Component Value Date    EGFR 46 01/09/2024    EGFR 42 01/08/2024    EGFR 38 01/07/2024    CREATININE 1.29 01/09/2024    CREATININE 1.38 (H) 01/08/2024    CREATININE 1.50 (H) 01/07/2024       Cr on admission 1.50, current Cr 1.29  ELIESER resolved    Plan:  Trend renal indices  Monitor I&O's  Avoid nephrotoxins    COVID  Assessment & Plan  Covid positive on admission  Now " "requiring MFNC    Plan:  Escalate to moderate covid pathway  Start remdesivir    Localization-related symptomatic epilepsy and epileptic syndromes with complex partial seizures, intractable, without status epilepticus (HCC)  Assessment & Plan  Patient has history of seizures, outpatient maintained on lamictal and topamax  S/p left temporal lobectomy for seizure d/o  Neurology consulted, she was started on vimpat this morning given concern for breakthrough seizures    Plan:  Continue vimpat, lamictal, topamax  Neurology following  Plan for eventual transfer to [facility] [place] for vEEG    * Stroke-like symptoms  Assessment & Plan  Per report, patient was having increased confusion, worsening ambulation, trouble word finding for a week. Patient brought in as a stroke alert. MRI brain read as \"Tiny focus of abnormal diffusion signal within the right basal ganglia involving the anterior limb of the internal capsule not clearly restricted on the ADC maps but suspicious for small acute lacunar infarct.\"    Plan:  Echo pending  Continue asa, statin  Neurology following, doubts this is truly a stroke             History of Present Illness     HPI: Carla Hunt is a 58 y.o. with pmhx of seizures s/p left lobectomy in 2007, CKD who was admitted on 1/8 for concern of stroke. Per family, she had increasing confusion, unsteady gait, difficulty with word finding. She reportedly was being treated for a UTI 12/26 with keflex, she started getting dizzy from keflex so she was switched to cipro. In the ED, imaging was concerning for punctate hemorrhages for which NCC at [place] favored calcifications over SAH. MRI was obtained, per neurology they are unclear if it is truly a stroke.     Today, 1/8, per chart review, patient was noted to have increasing alteration in mental status that was attributed to breakthrough seizures. Upon eval at bedside, patient is extremely delirious. She was in 4 point soft restraints and trying to get out of " bed. When addressed by name, Carla was able to respond but quickly was encephalopathic again. She was not redirectable.     History obtained from spouse and chart review.  Review of Systems   Unable to perform ROS: Mental status change   Psychiatric/Behavioral:  Positive for agitation.      Disposition: Stepdown Level 2   Historical Information   Past Medical History:  No date: Hx of hypercalcemia  2021: Lymphoma (HCC)  No date: Parathyroid disease (HCC)  No date: Seizures (HCC)  No date: Situational depression      Comment:  24 yr old son  from drug overdose Past Surgical History:  2007: CRANIOTOMY FOR TEMPORAL LOBECTOMY; Left   Current Outpatient Medications   Medication Instructions    busPIRone (BUSPAR) 5 mg, Oral, 2 times daily    escitalopram (LEXAPRO) 10 mg tablet No dose, route, or frequency recorded.    ibuprofen (MOTRIN) 600 mg, Oral, Every 6 hours PRN    lamoTRIgine (LaMICtal) 200 MG tablet 1 tablet by mouth twice per day.    lamoTRIgine (LAMICTAL) 200 mg, Oral, 2 times daily    medroxyPROGESTERone acetate (DEPO-PROVERA SYRINGE) 150 mg, Intramuscular, Every 3 months    ondansetron (ZOFRAN-ODT) 4 mg, Oral, Every 8 hours PRN    QUEtiapine (SEROQUEL) 50 mg, Daily at bedtime    tamsulosin (FLOMAX) 0.4 mg, Oral, Daily with dinner    topiramate (TOPAMAX) 100 mg, Oral, 2 times daily    topiramate (TOPAMAX) 100 mg, Oral, 2 times daily    venlafaxine (EFFEXOR-XR) 75 mg, Oral, Daily    No Known Allergies   Social History     Tobacco Use    Smoking status: Never    Smokeless tobacco: Never   Vaping Use    Vaping status: Never Used   Substance Use Topics    Alcohol use: No    Drug use: Never    Family History   Problem Relation Age of Onset    Diabetes Mother     Cancer Father         Objective                            Vitals I/O      Most Recent Min/Max in 24hrs   Temp 99.7 °F (37.6 °C) Temp  Min: 99.4 °F (37.4 °C)  Max: 101.4 °F (38.6 °C)   Pulse (!) 113 Pulse  Min: 86  Max: 134   Resp (!) 24 Resp  Min: 18   Max: 25   /96 BP  Min: 95/69  Max: 136/88   O2 Sat 92 % SpO2  Min: 89 %  Max: 98 %      Intake/Output Summary (Last 24 hours) at 2024 1800  Last data filed at 2024 1400  Gross per 24 hour   Intake 1000 ml   Output --   Net 1000 ml       Diet NPO    Invasive Monitoring           Physical Exam   Physical Exam  Vitals and nursing note reviewed.   Eyes:      General: No scleral icterus.     Conjunctiva/sclera: Conjunctivae normal.   Skin:     General: Skin is warm.   HENT:      Head: Normocephalic and atraumatic.      Mouth/Throat:      Mouth: Mucous membranes are moist.   Cardiovascular:      Rate and Rhythm: Regular rhythm. Tachycardia present.      Pulses: Normal pulses.      Heart sounds: Normal heart sounds.   Abdominal: General: There is no distension.      Palpations: Abdomen is soft.      Tenderness: There is no abdominal tenderness. There is no guarding.   Constitutional:       General: She is in acute distress.      Appearance: She is well-developed and well-nourished. She is not ill-appearing or toxic-appearing.   Pulmonary:      Effort: No accessory muscle usage, respiratory distress or accessory muscle usage.      Breath sounds: Normal breath sounds. No wheezing.   Neurological:      Mental Status: She is agitated, disoriented to person, disoriented to place and disoriented to time.      Motor: Strength full and intact in all extremities.      Comments: Patient is acutely agitated, disoriented to person/place/time.            Diagnostic Studies      EK/7 - sinus tachycardia  Imaging: I have personally reviewed pertinent reports.       Medications:  Scheduled PRN   [START ON 1/10/2024] aspirin, 81 mg, Daily  aspirin, 300 mg, Once  atorvastatin, 20 mg, Daily With Dinner  cefTRIAXone, 1,000 mg, Q24H  chlorhexidine, 15 mL, Q12H SARTHAK  docusate sodium, 100 mg, BID  heparin (porcine), 5,000 Units, Q8H SARTHAK  lacosamide, 200 mg, Q12H  lamoTRIgine, 200 mg, BID  LORazepam, 1 mg, Once  remdesivir, 200  mg, Q24H   Followed by  [START ON 1/10/2024] remdesivir, 100 mg, Q24H  topiramate, 150 mg, BID  venlafaxine, 75 mg, Daily      acetaminophen, 650 mg, Q6H PRN  LORazepam, 2 mg, Q4H PRN  ondansetron, 4 mg, Q6H PRN       Continuous          Labs:    CBC    Recent Labs     01/08/24 0410 01/09/24  0343   WBC 2.37* 3.01*   HGB 11.9 12.5   HCT 35.1 38.1    291     BMP    Recent Labs     01/08/24 0410 01/09/24  0342   SODIUM 139 143   K 4.0 4.1   * 117*   CO2 20* 19*   AGAP 6 7   BUN 18 18   CREATININE 1.38* 1.29   CALCIUM 8.6 9.1       Coags    Recent Labs     01/07/24 1916   INR 1.07   PTT 38*        Additional Electrolytes  Recent Labs     01/09/24  0342   MG 2.0          Blood Gas    No recent results  No recent results LFTs  Recent Labs     01/07/24 1916   ALT 92*   *   ALKPHOS 123*   ALB 3.7   TBILI 0.60       Infectious  Recent Labs     01/09/24  0335   PROCALCITONI 0.63*     Glucose  Recent Labs     01/07/24 1916 01/08/24  0410 01/09/24  0342   GLUC 131 109 102               Keysha Ortiz PA-C

## 2024-01-09 NOTE — ASSESSMENT & PLAN NOTE
S/P lobectomy 2007  With breakthrough seizure  Unable to tolerate p.o. diet currently  NG tube placed will continue with lamotrigine and Topamax  Currently awaiting transfer to [place] plan for video EEG

## 2024-01-09 NOTE — CASE MANAGEMENT
Case Management Discharge Planning Note    Patient name Carla Hunt  Location /-01 MRN [id number]  : 1965 Date 2024       Current Admission Date: 2024  Current Admission Diagnosis:Stroke-like symptoms   Patient Active Problem List    Diagnosis Date Noted    Stroke-like symptoms 2024    COVID 2024    Acute-on-chronic kidney injury  2024    Abnormal CT of the head 2024    Non-Hodgkin's lymphoma (HCC) 2022    Nephrolithiasis 2021    Localization-related symptomatic epilepsy and epileptic syndromes with complex partial seizures, intractable, without status epilepticus (HCC) 2020    Other specified anxiety disorders 2019    Reactive depression 2019    Hidradenitis suppurativa of left axilla 2019    Anxiety state 2018    Luetscher's syndrome 2018    Disorder of parathyroid gland (HCC) 2018    Eczema 2018    Gastroenteritis 2018    Grand mal status (HCC) 2018    Hypercalcemia 2018    Hypertensive disorder 2018    Impacted cerumen 2018    Open wound of hand except fingers 2018    Otitis externa 2018    Overweight 2018    Pain in face 2018    Skin sensation disturbance 2018    Subjective visual disturbance 2018    Encounter for gynecological examination (general) (routine) without abnormal findings 10/23/2018    Long term current use of hormonal contraceptive 10/23/2018    Rosacea 2015      LOS (days): 1  Geometric Mean LOS (GMLOS) (days):   Days to GMLOS:     OBJECTIVE:  Risk of Unplanned Readmission Score: 13.07         Current admission status: Inpatient   Preferred Pharmacy:   [id number] [place] [facility] - [address] [place] [address]  [place] [address]  Phone: [phone] Fax: [phone]    [facility] - [place], [place] - [address] [facility]  [address] [facility]  [address]  Phone: [phone] Fax: [phone]    Primary Care Provider: Tony Guevara MD    Primary Insurance: [employer]  Secondary Insurance: MEDICARE    DISCHARGE DETAILS:      Other Referral/Resources/Interventions Provided:  Interventions: Transportation  Referral Comments: Per rounding with [facility], patient is to be transferred to hospitals for VEEG monitoring. PACS working on the transfer. CM completed the medical necessity and placed in patient's binder with a face sheet.    Treatment Team Recommendation: Acute Hospital Transfer  Discharge Destination Plan:: Acute Hospital Transfer  Transport at Discharge : \[facility] [place] [facility]\"" Amb[facility]  Dispatcher Contacted: No

## 2024-01-09 NOTE — TELEPHONE ENCOUNTER
Pt daughter called to inform her Dr that pt was admitted into the hospital 01/06/2023. Pt daughter states that pt went to hospital Sunday night for covid. Pt daughter states pt is in critical condition and is having seizures. Pt daughter states that they have given her seizure medication and pt is waiting to get transfer to [facility]. The daughter states she feels that pt is not being made a priority.     Pt daughter would like pt neurologist to be made aware of the situation and to receive a call back about the current situation.     Callback #: [phone]

## 2024-01-09 NOTE — PROGRESS NOTES
"Progress Note - Neurology   Carla Davidson 58 y.o. female [id number]  Unit/Bed#: /-01    Assessment/Plan:      Localization-related symptomatic epilepsy and epileptic syndromes with complex partial seizures, intractable, without status epilepticus (HCC)  Assessment & Plan  58 y.o.  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 Pt found to have COVID infection in ED as well as hyperdense foci in both frontal regions suspicious for tiny foci of acute subarachnoid hemorrhage.     Neurodiagnostics:   - CTA H/N wwo contrast 1/7/24:   \"No acute arterial vascular abnormality in the head or neck. No flow-limiting large vessel arterial vascular stenosis in the head or neck. Tiny punctate foci of relative increased parenchymal density in the frontal lobes bilaterally, unchanged from 2 hours earlier and probably representing low-density parenchymal calcifications rather than parenchymal hemorrhage. Conservative management with an additional follow-up noncontrast head CT is recommended in 24 to 48 hours. Reidentified left temporal lobe resection. Dense basal ganglia and cerebellar calcifications again noted. Groundglass and consolidative airspace opacities most suspicious for extensive pneumonia seen throughout the visualized mid and upper lung zones more dense on the right than on the left.\"  - CTH wo contrast 1/7/24:   \"Multifocal pneumonia and/or edema.\"  - routine EEG 1/8/24:   \"This is an abnormal 30 minutes awake, drowsy, and asleep EEG due to disorganized theta-delta activity. Background activities in the delta/theta range are suggestive of a moderate diffuse cerebral dysfunction. The lack of epileptiform discharges on a routine EEG does not preclude the " "diagnosis of seizures or epilepsy. \"    Concern for focal seizures in setting of covid infection versus UTI versus worsening renal function.     Plan  - routine EEG completed, see above    - recommend pt be transferred to [facility] for vEEG monitoring urgently   - MRI brain wo contrast completed, see below, would have preferred to complete imaging wwo contrast but given worsening renal function, will hold off on contrast at this point   - Vimpat 400 mg IV x 1 load administered at 1022 AM 1/9/24. Then, continue with Vimpat 200 mg BID   - recommend NG tube placement   - Ensure that a.m. doses of topiramate, which was increased to 150 mg twice daily by neurology AP, and home Lamictal 100 mg BID, administered once NG tube is placed.  If patient continues to have fluctuations in exam with the topiramate, lamotrigine, and Vimpat, then  recommend discontinuing Vimpat and administer Keppra bolus. Continue topiramate and lamotrigine with Keppra   - lamotrigine and topiramate levels pending   - Administer Ativan prn seizure-like activity    - telemetry  - Continue seizure precautions   - Discussed seizure precautions  - Frequent neuro checks.  Continue to monitor and notify Neurology with any changes.  - Medical management and supportive care per primary team.  Correction of any metabolic or infectious disturbances.            * Stroke-like symptoms  Assessment & Plan    Neurodiagnostics:   - MRI brain wo contrast 1/9/24:   \"Tiny focus of abnormal diffusion signal within the right basal ganglia involving the anterior limb of the internal capsule not clearly restricted on the ADC maps but suspicious for small acute lacunar infarct.\"    Plan  - Stroke pathway  MRI brain wo contrast completed, see above   Recommend repeat MRI wo contrast in 4-6 weeks to confirm if the above diffusion signal was truly a stroke or shine through   Echo pending   Labs  Lipid Panel: Total cholesterol 118, LDL 59  Hemoglobin A1c pending    mg AR due " "to pt NPO as of 1/9/24 AM. Aspirin 81 mg daily starting 1/10/24   Atorvastatin 20 mg daily when able (given PO status), lower dose statin given low LDL   Goal normotension   Euglycemic, normothermic goal  Continue telemetry  PT/OT/ST  Stroke education  Frequent neuro checks. Continue to monitor and notify neurology with any changes.  STAT CT head for any acute change in neuro exam  - Medical management and supportive care per primary team. Correction of any metabolic or infectious disturbances.               Recommendations for outpatient neurological follow up have yet to be determined.    **History and physical examination obtained by attending neurologist. AP in room as witness to history and physical examination obtained and acted solely as a scribe for attending neurologist. This AP did not provide any decision making for this encounter.**        Subjective:   Chronological time line of events since first neurology evaluation 1/8/24:    0949 am 1/8/24: At time of neurology evaluation 1/8/24, pt had received PO home dose of topiramate 100 mg and lamotrigine 200 mg at 0949 am 1/8/24.     0945 AM 1/8/24: nursing note reads, \" patient was able to tolerate nectar thick fluid without complications.  No coughing nor choking episode noted.  No loss of fluid noted from mouth.  Upgraded to thin liquids.  Patient able to tolerate thin liquids and swallowed whole pills-- one at a time--without issues as well.\"    1232 PM 1/8/24: Neurology AP contacted by nursing that patient is restless, pulling at leads, pulled out 2 IVs and is keeping head of nurse when nurses speaking to her.  Neurology attending with neurology AP at the time and made aware.  Neurology attending then discussed case with Dr. Bae from neurology attending. In neurology attending's conversation with Dr. Bae, patient's aphasia seem to be fluctuating throughout the day and with patient's agitation, there was concern for status.  Neurology AP " "contacted EMU attending at 1245 PM and patient was approved to transfer to hospitals for video EEG monitoring at 1251 PM. Dr. Bae was made aware in person of pt's approval to transfer pt to hospitals for vEEG monitoring. Dr Bae to contact PACS for transfer.     1326 PM 1/8/24: Neurology AP called patient's  on the phone to update the patient's  the plan to transfer patient to hospitals for video EEG monitoring.  Patient's  in agreement with plan.    Neurology was not contacted the remainder of the day regarding questions, concerns, or updates regarding this patient.     On 1/9/24, neurology attending was present with neurology AP for all of the following events on 1/9/24. Neurology attending making the recommendations that follow below.     0911 AM 1/9/24: Neurology contacted internal medicine team regarding concern that patient has not been yet transferred to hospitals for video EEG monitoring.  Neurology requested that internal medicine call PACS and request patient be higher priority for transfer.    0945 AM 1/9/24: Neurology made aware that patient was moved to prior to transfer and still waiting EMU bed at hospitals.  Internal medicine also stated that patient failed bedside speech eval and has not received p.o. seizure medications. Chart then reviewed by neurology AP and pt noted to not have received topirate and lamotrigine night doses 1/8/24 at 1843 PM due to \"pt not alert enough for po med admin.\"  Neurology was not made aware of patient not being able to tolerate p.o. intake at any time prior to this.     0958 AM 1/9/24: Neurology AP ordered Vimpat 400 mg IV x 1 as a load, then start Vimpat 200 mg BID.     0959 AM 1/9/24: Neurology AP informed internal medicine attending that IV Vimpat was ordered.  Recommended stopping the IV Vimpat and restarting the p.o. AEDs (topiramate and lamotrigine) when able.  Internal medicine attending informed that no changes were made to the p.o. orders to neurology AP and " that this should be done at the internal medicine attending's discretion.    1120 AM 1/9/24: Neurology AP neurology attending evaluated patient.  Patient began thrashing and screaming incomprehensible speech.  One-to-one aide and nursing to room. Pt's  reports pt with periodic episodes of thrashing and having incomprehensible yelling while he has been at the hospital. Pt's  notes pt's mental status worsening throughout her hospital stay. Patient unable to provide any history given her mental status.     1126 AM 1/9/24: Neurology contacted internal medicine noting that patient should be higher level of care and needs to be transferred [facility] EEG monitoring today.  There is high concern the patient is having multiple seizures and postictal phases.  Neurology attending then reached out to pharmacy by phone to determine if patient's home topiramate and lamotrigine can be crushed and administered via NG tube.    1132 AM 1/9/24: Neurology contacted internal medicine attending that.  And lamotrigine can be crushed and given through NG tube.  Upon placing NG tube and restarting lamotrigine 200 mg twice daily and home topiramate 100 mg BID.  When NG tube is placed, IV Vimpat can be DC'd.  Patient also recommended to have as needed Ativan ordered for seizure activity.    1144 AM 1/9/24: Neurology  contacted internal medicine to ensure that a.m. doses of topiramate, which was increased to 150 mg twice daily by neurology AP, and home Lamictal 100 mg BID, should be administered once NG tube is placed.  If patient continues to have fluctuations in exam with the topiramate, Lamictal, and Vimpat, then DC Vimpat and administer Keppra bolus.    1145 AM 1/9/24: Neurology AP contacted by critical care AP that critical care is going to evaluate patient.  Critical care EP noted that he is trying to increase prior to transfer as patient is currently prior to a 2 and there are no beds in ICU currently so transfer  may be faster.      Past Medical History:   Diagnosis Date    Hx of hypercalcemia     Lymphoma (HCC) 2021    Parathyroid disease (HCC)     Seizures (HCC)     Situational depression     24 yr old son  from drug overdose     Past Surgical History:   Procedure Laterality Date    CRANIOTOMY FOR TEMPORAL LOBECTOMY Left 2007     Family History   Problem Relation Age of Onset    Diabetes Mother     Cancer Father      Social History     Socioeconomic History    Marital status: /Civil Union     Spouse name: Not on file    Number of children: Not on file    Years of education: Not on file    Highest education level: Not on file   Occupational History    Not on file   Tobacco Use    Smoking status: Never    Smokeless tobacco: Never   Vaping Use    Vaping status: Never Used   Substance and Sexual Activity    Alcohol use: No    Drug use: Never    Sexual activity: Not on file   Other Topics Concern    Not on file   Social History Narrative    Not on file     Social Determinants of Health     Financial Resource Strain: Not on file   Food Insecurity: Not on file   Transportation Needs: Not on file   Physical Activity: Not on file   Stress: Not on file   Social Connections: Not on file   Intimate Partner Violence: Not on file   Housing Stability: Not on file     E-Cigarette/Vaping    E-Cigarette Use Never User      E-Cigarette/Vaping Substances    Nicotine No     THC No     CBD No          Medications:  All current active meds have been reviewed and current meds:  Scheduled Meds:  Current Facility-Administered Medications   Medication Dose Route Frequency Provider Last Rate    acetaminophen  650 mg Oral Q6H PRN Garrett Zapata MD      [START ON 1/10/2024] aspirin  81 mg Oral Daily Norma Alfaro PA-C      aspirin  300 mg Rectal Once Norma Alfaro PA-C      atorvastatin  20 mg Oral Daily With Dinner Norma Alfaro PA-C      cefTRIAXone  1,000 mg Intravenous Q24H Cesar Lopez PA-C 1,000 mg (24 0528)     docusate sodium  100 mg Oral BID DANYELLE Spann      heparin (porcine)  5,000 Units Subcutaneous Q8H [facility] DANYELLE Spann      lacosamide  200 mg Intravenous Q12H Norma Alfaro PA-C      lamoTRIgine  200 mg Oral BID DANYELLE Spann      LORazepam  2 mg Intravenous Q4H PRN Garrett Zapata MD      ondansetron  4 mg Intravenous Q6H PRN DANYELLE Spann      sodium chloride  100 mL/hr Intravenous Continuous ARTHUR SpannNP 100 mL/hr (01/08/24 0405)    topiramate  150 mg Oral BID Norma Alfaro PA-C      venlafaxine  75 mg Oral Daily DANYELLE Spann       Continuous Infusions:sodium chloride, 100 mL/hr, Last Rate: 100 mL/hr (01/08/24 0405)      PRN Meds:.  acetaminophen    LORazepam    ondansetron       ROS:   Review of Systems   Unable to perform ROS: Mental status change             Vitals:   /100   Pulse 99   Temp (!) 101 °F (38.3 °C) (Axillary)   Resp (!) 24   SpO2 93%     Physical Exam:   Physical Exam  Vitals and nursing note reviewed.   Constitutional:       General: She is in acute distress.      Appearance: She is ill-appearing.   HENT:      Head: Normocephalic and atraumatic.   Cardiovascular:      Rate and Rhythm: Tachycardia present.   Pulmonary:      Comments: O2 via NC in place and increased during examination   Neurological:      Coordination: Finger-nose-finger test: unable to assess, pt not following commands. Heel-to-shin test: unable to assess, pt not following commands.       Neurologic Exam     Mental Status   - Pt not following commands  - pt not producing any comprehensible speech      Cranial Nerves   Unable to reliably test cranial nerves given poor mental status      Motor Exam Pt thrashing around extremities x 4 with limitation due to soft wrist and ankle restraints in place b/l      Sensory Exam   - unable to reliably test sensation given poor mental status      Gait, Coordination, and Reflexes      Coordination   Finger-nose-finger test: unable to assess, pt not following commands.  Heel-to-shin test: unable to assess, pt not following commands.          Labs: Attending neurologist personally reviewed pertinent reports.   Recent Results (from the past 24 hour(s))   Procalcitonin    Collection Time: 01/09/24  3:35 AM   Result Value Ref Range    Procalcitonin 0.63 (H) <=0.25 ng/ml   Lactic acid, plasma (w/reflex if result > 2.0)    Collection Time: 01/09/24  3:35 AM   Result Value Ref Range    LACTIC ACID 1.0 0.5 - 2.0 mmol/L   Basic metabolic panel    Collection Time: 01/09/24  3:42 AM   Result Value Ref Range    Sodium 143 135 - 147 mmol/L    Potassium 4.1 3.5 - 5.3 mmol/L    Chloride 117 (H) 96 - 108 mmol/L    CO2 19 (L) 21 - 32 mmol/L    ANION GAP 7 mmol/L    BUN 18 5 - 25 mg/dL    Creatinine 1.29 0.60 - 1.30 mg/dL    Glucose 102 65 - 140 mg/dL    Calcium 9.1 8.4 - 10.2 mg/dL    eGFR 46 ml/min/1.73sq m   Magnesium    Collection Time: 01/09/24  3:42 AM   Result Value Ref Range    Magnesium 2.0 1.9 - 2.7 mg/dL   Lipid Panel with Direct LDL reflex    Collection Time: 01/09/24  3:42 AM   Result Value Ref Range    Cholesterol 118 See Comment mg/dL    Triglycerides 96 See Comment mg/dL    HDL, Direct 40 (L) >=50 mg/dL    LDL Calculated 59 0 - 100 mg/dL   CBC    Collection Time: 01/09/24  3:43 AM   Result Value Ref Range    WBC 3.01 (L) 4.31 - 10.16 Thousand/uL    RBC 4.34 3.81 - 5.12 Million/uL    Hemoglobin 12.5 11.5 - 15.4 g/dL    Hematocrit 38.1 34.8 - 46.1 %    MCV 88 82 - 98 fL    MCH 28.8 26.8 - 34.3 pg    MCHC 32.8 31.4 - 37.4 g/dL    RDW 13.3 11.6 - 15.1 %    Platelets 291 149 - 390 Thousands/uL    MPV 9.6 8.9 - 12.7 fL       Imaging: Attending neurologist personally reviewed pertinent imaging in PACS, including MRI brain wo contrast 1/9/24, CTA H/n wwo contrast 1/7/24, CTH wo contrast 1/7/24 and I have personally reviewed PACS reports.     EKG, Pathology, and Other Studies: I have personally reviewed  pertinent reports.       VTE Prophylaxis: Heparin      Counseling / Coordination of Care  Please see attending attestation for amount of time spent with patient.     This note was completed in part utilizing Dragon Software.  Grammatical errors, random word insertions, spelling mistakes, and incomplete sentences may be an occasional consequence of this system secondary to software limitations, ambient noise, and hardware issues.  If you have any questions or concerns about the content, text, or information contained within the body of this dictation, please contact the provider for clarification.

## 2024-01-09 NOTE — ASSESSMENT & PLAN NOTE
"Per report, patient was having increased confusion, worsening ambulation, trouble word finding for a week. Patient brought in as a stroke alert. MRI brain read as \"Tiny focus of abnormal diffusion signal within the right basal ganglia involving the anterior limb of the internal capsule not clearly restricted on the ADC maps but suspicious for small acute lacunar infarct.\"    Plan:  Echo pending  Continue asa, statin  Neurology following, doubts this is truly a stroke    "

## 2024-01-09 NOTE — ED NOTES
Pt placed in hospital bed at this time. Bed alarm on, VRC in place.      Cathryn Silverio RN  01/09/24 0601

## 2024-01-09 NOTE — TRANSPORTATION MEDICAL NECESSITY
"Section I - General Information    Name of Patient: Carla Hunt                 : 1965    Medicare #: [id number]  Transport Date: 24 (PCS is valid for round trips on this date and for all repetitive trips in the 60-day range as noted below.)  Origin: [facility] 2ND FLOOR MED SURG UNIT                                                         Destination: [facility]  Is the pt's stay covered under Medicare Part A (PPS/DRG)   [x]     Closest appropriate facility? If no, why is transport to more distant facility required? Yes  If hospice pt, is this transport related to pt's terminal illness? NA       Section II - Medical Necessity Questionnaire  Ambulance transportation is medically necessary only if other means of transport are contraindicated or would be potentially harmful to the patient. To meet this requirement, the patient must either be \"bed confined\" or suffer from a condition such that transport by means other than ambulance is contraindicated by the patient's condition. The following questions must be answered by the medical professional signing below for this form to be valid:    1)  Describe the MEDICAL CONDITION (physical and/or mental) of this patient AT THE TIME OF AMBULANCE TRANSPORT that requires the patient to be transported in an ambulance and why transport by other means is contraindicated by the patient's condition: Patient was admitted due to stroke-like symptoms and is pending transfer to [facility] for VEEG monitoring. Patient is confused and has impaired safety awareness. Patient is limited by pain and fatigue and is a fall risk. She is also on a 1:1 due to confusion and impulsivity.    2) Is the patient \"bed confined\" as defined below?     No  To be \"be confined\" the patient must satisfy all three of the following conditions: (1) unable to get up from bed without Assistance; AND (2) unable to ambulate; AND (3) unable to sit in a chair or " wheelchair.    3) Can this patient safely be transported by car or wheelchair van (i.e., seated during transport without a medical attendant or monitoring)?   No    4) In addition to completing questions 1-3 above, please check any of the following conditions that apply*:   *Note: supporting documentation for any boxes checked must be maintained in the patient's medical records.  If hosp-hosp transfer, describe services needed at 2nd facility not available at 1st facility?   Patient is confused  Moderate/severe pain on movement   Medical attendant required   Special handling/isolation/infection control precautions required   Unable to tolerate seated position for time needed to transport       Section III - Signature of Physician or Healthcare Professional  I certify that the above information is true and correct based on my evaluation of this patient, and represent that the patient requires transport by ambulance and that other forms of transport are contraindicated. I understand that this information will be used by the Centers for Medicare and Medicaid Services (CMS) to support the determination of medical necessity for ambulance services, and I represent that I have personal knowledge of the patient's condition at time of transport.    []  If this box is checked, I also certify that the patient is physically or mentally incapable of signing the ambulance service's claim and that the institution with which I am affiliated has furnished care, services, or assistance to the patient.    My signature below is made on behalf of the patient pursuant to 42 CFR §424.36(b)(4). In accordance with 42 CFR §424.37, the specific reason(s) that the patient is physically or mentally incapable of signing the claim form is as follows: N/A.      Signature of Physician* or Healthcare Professional______________________________________  Signature Date 01/09/24 (For scheduled repetitive transports, this form is not valid for transports  performed more than 60 days after this date)    Printed Name & Credentials of Physician or Healthcare Professional (MD, DO, RN, etc.) SORAYA Ocampo    *Form must be signed by patient's attending physician for scheduled, repetitive transports. For non-repetitive, unscheduled ambulance transports, if unable to obtain the signature of the attending physician, any of the following may sign (choose appropriate option below)  [] Physician Assistant []  Clinical Nurse Specialist []  Registered Nurse  []  Nurse Practitioner  [x] Discharge Planner

## 2024-01-09 NOTE — PLAN OF CARE
Problem: Prexisting or High Potential for Compromised Skin Integrity  Goal: Skin integrity is maintained or improved  Description: INTERVENTIONS:  - Identify patients at risk for skin breakdown  - Assess and monitor skin integrity  - Assess and monitor nutrition and hydration status  - Monitor labs   - Assess for incontinence   - Turn and reposition patient  - Assist with mobility/ambulation  - Relieve pressure over bony prominences  - Avoid friction and shearing  - Provide appropriate hygiene as needed including keeping skin clean and dry  - Evaluate need for skin moisturizer/barrier cream  - Collaborate with interdisciplinary team   - Patient/family teaching  - Consider wound care consult   Outcome: Progressing     Problem: PAIN - ADULT  Goal: Verbalizes/displays adequate comfort level or baseline comfort level  Description: Interventions:  - Encourage patient to monitor pain and request assistance  - Assess pain using appropriate pain scale  - Administer analgesics based on type and severity of pain and evaluate response  - Implement non-pharmacological measures as appropriate and evaluate response  - Consider cultural and social influences on pain and pain management  - Notify physician/advanced practitioner if interventions unsuccessful or patient reports new pain  Outcome: Progressing     Problem: INFECTION - ADULT  Goal: Absence or prevention of progression during hospitalization  Description: INTERVENTIONS:  - Assess and monitor for signs and symptoms of infection  - Monitor lab/diagnostic results  - Monitor all insertion sites, i.e. indwelling lines, tubes, and drains  - Monitor endotracheal if appropriate and nasal secretions for changes in amount and color  - [place] appropriate cooling/warming therapies per order  - Administer medications as ordered  - Instruct and encourage patient and family to use good hand hygiene technique  - Identify and instruct in appropriate isolation precautions for  identified infection/condition  Outcome: Progressing  Goal: Absence of fever/infection during neutropenic period  Description: INTERVENTIONS:  - Monitor WBC    Outcome: Progressing     Problem: SAFETY ADULT  Goal: Patient will remain free of falls  Description: INTERVENTIONS:  - Educate patient/family on patient safety including physical limitations  - Instruct patient to call for assistance with activity   - Consult OT/PT to assist with strengthening/mobility   - Keep Call bell within reach  - Keep bed low and locked with side rails adjusted as appropriate  - Keep care items and personal belongings within reach  - Initiate and maintain comfort rounds  - Make Fall Risk Sign visible to staff  - Offer Toileting every  Hours, in advance of need  - Initiate/Maintain alarm  - Obtain necessary fall risk management equipment:   - Apply yellow socks and bracelet for high fall risk patients  - Consider moving patient to room near nurses station  Outcome: Progressing  Goal: Maintain or return to baseline ADL function  Description: INTERVENTIONS:  -  Assess patient's ability to carry out ADLs; assess patient's baseline for ADL function and identify physical deficits which impact ability to perform ADLs (bathing, care of mouth/teeth, toileting, grooming, dressing, etc.)  - Assess/evaluate cause of self-care deficits   - Assess range of motion  - Assess patient's mobility; develop plan if impaired  - Assess patient's need for assistive devices and provide as appropriate  - Encourage maximum independence but intervene and supervise when necessary  - Involve family in performance of ADLs  - Assess for home care needs following discharge   - Consider OT consult to assist with ADL evaluation and planning for discharge  - Provide patient education as appropriate  Outcome: Progressing  Goal: Maintains/Returns to pre admission functional level  Description: INTERVENTIONS:  - Perform AM-PAC 6 Click Basic Mobility/ Daily Activity  assessment daily.  - Set and communicate daily mobility goal to care team and patient/family/caregiver.   - Collaborate with rehabilitation services on mobility goals if consulted  - Perform Range of Motion  times a day.  - Reposition patient every  hours.  - Dangle patient  times a day  - Stand patient  times a day  - Ambulate patient  times a day  - Out of bed to chair  times a day   - Out of bed for meals times a day  - Out of bed for toileting  - Record patient progress and toleration of activity level   Outcome: Progressing     Problem: DISCHARGE PLANNING  Goal: Discharge to home or other facility with appropriate resources  Description: INTERVENTIONS:  - Identify barriers to discharge w/patient and caregiver  - Arrange for needed discharge resources and transportation as appropriate  - Identify discharge learning needs (meds, wound care, etc.)  - Arrange for interpretive services to assist at discharge as needed  - Refer to Case Management Department for coordinating discharge planning if the patient needs post-hospital services based on physician/advanced practitioner order or complex needs related to functional status, cognitive ability, or social support system  Outcome: Progressing     Problem: Knowledge Deficit  Goal: Patient/family/caregiver demonstrates understanding of disease process, treatment plan, medications, and discharge instructions  Description: Complete learning assessment and assess knowledge base.  Interventions:  - Provide teaching at level of understanding  - Provide teaching via preferred learning methods  Outcome: Progressing     Problem: Neurological Deficit  Goal: Neurological status is stable or improving  Description: Interventions:  - Monitor and assess patient's level of consciousness, motor function, sensory function, and level of assistance needed for ADLs.   - Monitor and report changes from baseline. Collaborate with interdisciplinary team to initiate plan and implement  interventions as ordered.   - Provide and maintain a safe environment.  - Consider seizure precautions.  - Consider fall precautions.  - Consider aspiration precautions.  - Consider bleeding precautions.  Outcome: Progressing     Problem: Activity Intolerance/Impaired Mobility  Goal: Mobility/activity is maintained at optimum level for patient  Description: Interventions:  - Assess and monitor patient  barriers to mobility and need for assistive/adaptive devices.  - Assess patient's emotional response to limitations.  - Collaborate with interdisciplinary team and initiate plans and interventions as ordered.  - Encourage independent activity per ability.  - Maintain proper body alignment.  - Perform active/passive rom as tolerated/ordered.  - Plan activities to conserve energy.  - Turn patient as appropriate  Outcome: Progressing     Problem: Communication Impairment  Goal: Ability to express needs and understand communication  Description: Assess patient's communication skills and ability to understand information.  Patient will demonstrate use of effective communication techniques, alternative methods of communication and understanding even if not able to speak.     - Encourage communication and provide alternate methods of communication as needed.  - Collaborate with case management/ for discharge needs.  - Include patient/family/caregiver in decisions related to communication.  Outcome: Progressing     Problem: Potential for Aspiration  Goal: Non-ventilated patient's risk of aspiration is minimized  Description: Assess and monitor vital signs, respiratory status, and labs (WBC).  Monitor for signs of aspiration (tachypnea, cough, rales, wheezing, cyanosis, fever).    - Assess and monitor patient's ability to swallow.  - Place patient up in chair to eat if possible.  - HOB up at 90 degrees to eat if unable to get patient up into chair.  - Supervise patient during oral intake.   - Instruct patient/  family to take small bites.  - Instruct patient/ family to take small single sips when taking liquids.  - Follow patient-specific strategies generated by speech pathologist.  Outcome: Progressing  Goal: Ventilated patient's risk of aspiration is minimized  Description: Assess and monitor vital signs, respiratory status, airway cuff pressure, and labs (WBC).  Monitor for signs of aspiration (tachypnea, cough, rales, wheezing, cyanosis, fever).    - Elevate head of bed 30 degrees if patient has tube feeding.  - Monitor tube feeding.  Outcome: Progressing     Problem: Nutrition  Goal: Nutrition/Hydration status is improving  Description: Monitor and assess patient's nutrition/hydration status for malnutrition (ex- brittle hair, bruises, dry skin, pale skin and conjunctiva, muscle wasting, smooth red tongue, and disorientation). Collaborate with interdisciplinary team and initiate plan and interventions as ordered.  Monitor patient's weight and dietary intake as ordered or per policy. Utilize nutrition screening tool and intervene per policy. Determine patient's food preferences and provide high-protein, high-caloric foods as appropriate.     - Assist patient with eating.  - Allow adequate time for meals.  - Encourage patient to take dietary supplement as ordered.  - Collaborate with clinical nutritionist.  - Include patient/family/caregiver in decisions related to nutrition.  Outcome: Progressing

## 2024-01-09 NOTE — PROGRESS NOTES
[facility]  Progress Note  Name: Carla Hunt I  MRN: [id number]  Unit/Bed#: -01 I Date of Admission: 1/7/2024   Date of Service: 1/9/2024 I Hospital Day: 1    Assessment/Plan   * Stroke-like symptoms  Assessment & Plan  Presents with confusion, aphasia, lethargy, dizziness and weakness that started Wednesday.  Of note patient recently treated for UTI with keflex, had an adverse reaction of dizziness and started on Cipro. +COVID on admission. Hx of seizures s/p lobectomy  Suspect likely secondary to seizure activity  Continue p.o. seizure meds lamotrigine and Topamax through NG tube  Appreciate neurology recommendations  MRI revealed possible small acute stroke lacunar  Speech eval    Acute-on-chronic kidney injury   Assessment & Plan  Likely from volume depletion  Has since resolved with IV fluids  Kidney function near baseline at approximately 1.2    Localization-related symptomatic epilepsy and epileptic syndromes with complex partial seizures, intractable, without status epilepticus (HCC)  Assessment & Plan  S/P lobectomy 2007  With breakthrough seizure  Unable to tolerate p.o. diet currently  NG tube placed will continue with lamotrigine and Topamax  Currently awaiting transfer to [place] plan for video EEG         VTE Pharmacologic Prophylaxis:   Pharmacologic: Heparin  Mechanical VTE Prophylaxis in Place: Yes    Patient Centered Rounds: I have performed bedside rounds with nursing staff today.    Discussions with Specialists or Other Care Team Provider: heladio,. nursing    Education and Discussions with Family / Patient: pt,  at bedside  Age  Time Spent for Care: 30 minutes.  More than 50% of total time spent on counseling and coordination of care as described above.    Current Length of Stay: 1 day(s)    Current Patient Status: Inpatient   Certification Statement: The patient will continue to require additional inpatient hospital stay due to see below    Discharge  Plan: [facility]ending transfer [facility]    Code Status: Level 1 - Full Code      Subjective:   Denies chest pain, shortness of breath, cough, fevers, chills    Objective:     Vitals:   Temp (24hrs), Av.9 °F (37.7 °C), Min:99.4 °F (37.4 °C), Max:101 °F (38.3 °C)    Temp:  [99.4 °F (37.4 °C)-101 °F (38.3 °C)] 101 °F (38.3 °C)  HR:  [] 99  Resp:  [18-25] 24  BP: ()/() 135/100  SpO2:  [91 %-98 %] 93 %  There is no height or weight on file to calculate BMI.     Input and Output Summary (last 24 hours):       Intake/Output Summary (Last 24 hours) at 2024 1223  Last data filed at 2024 0900  Gross per 24 hour   Intake 0 ml   Output --   Net 0 ml       Physical Exam:     Physical Exam  Constitutional:       General: She is not in acute distress.     Appearance: She is well-developed. She is not diaphoretic.   HENT:      Head: Normocephalic and atraumatic.      Nose: Nose normal.      Mouth/Throat:      Pharynx: No oropharyngeal exudate.   Eyes:      General: No scleral icterus.        Right eye: No discharge.         Left eye: No discharge.      Conjunctiva/sclera: Conjunctivae normal.   Neck:      Thyroid: No thyromegaly.      Vascular: No JVD.   Cardiovascular:      Rate and Rhythm: Normal rate and regular rhythm.      Heart sounds: Normal heart sounds. No murmur heard.     No friction rub. No gallop.   Pulmonary:      Effort: Pulmonary effort is normal. No respiratory distress.      Breath sounds: Normal breath sounds. No wheezing or rales.   Chest:      Chest wall: No tenderness.   Abdominal:      General: Bowel sounds are normal. There is no distension.      Palpations: Abdomen is soft.      Tenderness: There is no abdominal tenderness. There is no guarding or rebound.   Musculoskeletal:         General: No tenderness or deformity. Normal range of motion.      Cervical back: Normal range of motion and neck supple.   Skin:     General: Skin is warm and dry.      Findings: No erythema  or rash.   Neurological:      Mental Status: She is alert. Mental status is at baseline.      Cranial Nerves: No cranial nerve deficit.      Sensory: No sensory deficit.      Motor: No abnormal muscle tone.      Coordination: Coordination normal.       (    Additional Data:     Labs:    Results from last 7 days   Lab Units 01/09/24  0343 01/08/24  0410 01/07/24  1916   WBC Thousand/uL 3.01*   < > 3.44*   HEMOGLOBIN g/dL 12.5   < > 13.5   HEMATOCRIT % 38.1   < > 40.7   PLATELETS Thousands/uL 291   < > 308   NEUTROS PCT %  --   --  87*   LYMPHS PCT %  --   --  4*   MONOS PCT %  --   --  7   EOS PCT %  --   --  0    < > = values in this interval not displayed.     Results from last 7 days   Lab Units 01/09/24  0342 01/08/24 0410 01/07/24 1916   SODIUM mmol/L 143   < > 136   POTASSIUM mmol/L 4.1   < > 3.7   CHLORIDE mmol/L 117*   < > 107   CO2 mmol/L 19*   < > 20*   BUN mg/dL 18   < > 22   CREATININE mg/dL 1.29   < > 1.50*   ANION GAP mmol/L 7   < > 9   CALCIUM mg/dL 9.1   < > 9.3   ALBUMIN g/dL  --   --  3.7   TOTAL BILIRUBIN mg/dL  --   --  0.60   ALK PHOS U/L  --   --  123*   ALT U/L  --   --  92*   AST U/L  --   --  141*   GLUCOSE RANDOM mg/dL 102   < > 131    < > = values in this interval not displayed.     Results from last 7 days   Lab Units 01/07/24 1916   INR  1.07     Results from last 7 days   Lab Units 01/07/24  1904   POC GLUCOSE mg/dl 138         Results from last 7 days   Lab Units 01/09/24  0335 01/07/24 1916   LACTIC ACID mmol/L 1.0 1.8   PROCALCITONIN ng/ml 0.63*  --            * I Have Reviewed All Lab Data Listed Above.  * Additional Pertinent Lab Tests Reviewed: All Labs Within Last 24 Hours Reviewed    Imaging:    Imaging Reports Reviewed Today Include: na  Imaging Personally Reviewed by Myself Includes:  na more welcome back    Recent Cultures (last 7 days):     Results from last 7 days   Lab Units 01/07/24  2256 01/07/24 1916 01/07/24 1910   BLOOD CULTURE   --  No Growth at 24 hrs. No  Growth at 24 hrs.   URINE CULTURE  Culture too young- will reincubate  --   --        Last 24 Hours Medication List:   Current Facility-Administered Medications   Medication Dose Route Frequency Provider Last Rate    acetaminophen  650 mg Oral Q6H PRN DANYELLE Spann      [START ON 1/10/2024] aspirin  81 mg Oral Daily Norma Alfaro PA-C      aspirin  300 mg Rectal Once Norma Alfaro PA-C      atorvastatin  20 mg Oral Daily With Dinner Norma Alfaro PA-C      cefTRIAXone  1,000 mg Intravenous Q24H Cesar Lopez PA-C 1,000 mg (01/09/24 0528)    docusate sodium  100 mg Oral BID DANYELLE Spann      heparin (porcine)  5,000 Units Subcutaneous Q8H [facility] DANYELLE Spann      lacosamide  200 mg Intravenous Q12H Norma Alfaro PA-C      lamoTRIgine  200 mg Oral BID DANYELLE Spann      LORazepam  2 mg Intravenous Q4H PRN Garrett Zapata MD      ondansetron  4 mg Intravenous Q6H PRN DANYELLE Spann      sodium chloride  100 mL/hr Intravenous Continuous DANYELLE Spann 100 mL/hr (01/08/24 0405)    topiramate  150 mg Oral BID Norma Alfaro PA-C      venlafaxine  75 mg Oral Daily DANYELLE Spann          Today, Patient Was Seen By: Garrett Zapata MD    ** Please Note: Dictation voice to text software may have been used in the creation of this document. **

## 2024-01-09 NOTE — ASSESSMENT & PLAN NOTE
Presents with confusion, aphasia, lethargy, dizziness and weakness that started Wednesday.  Of note patient recently treated for UTI with keflex, had an adverse reaction of dizziness and started on Cipro. +COVID on admission. Hx of seizures s/p lobectomy  Suspect likely secondary to seizure activity  Continue p.o. seizure meds lamotrigine and Topamax through NG tube  Appreciate neurology recommendations  MRI revealed possible small acute stroke lacunar  Speech eval

## 2024-01-09 NOTE — QUICK NOTE
Discussed with internal medicine attending regarding timing for transfer. Internal medicine attending and ICU AP called PACS regarding timing for pt transfer and to discuss critical nature of pt transfer but were not given any clear answers. Internal medicine attending again reached out to PACS via tiger text at 2:40 PM to determine if there is any bed available at hospitals for pt to be transferred, no answer at this point. This AP called to update pt's  regarding communication. Pt's  notes appreciation for update. Attending neurologist updated as well.

## 2024-01-09 NOTE — ASSESSMENT & PLAN NOTE
Covid positive on admission  Now requiring [facility]    Plan:  Escalate to moderate covid pathway  Start remdesivir

## 2024-01-09 NOTE — QUICK NOTE
Asked to evaluate patient by nursing giving ongoing AMS. Per chart review appears that patient is pending transfer to [facility] for VEEG monitoring, however [facility] without bed availability. Spoke with patient and daughter at the bedside. Patient alert upon entering the room, moving all four extremities spontaneously, however not able to answer orientation questions. She responds to direct questioning and was able to follow simple command, however appears acutely confused, and is unable to cooperate with detailed neurologic examination. She underwent MRI tonight, but results are currently pending. Patient has seizure precautions, fall precautions, and aspiration precautions in place. q4H neuro checks and virtual observation to mitigate fall risk ordered. Recommend continuing with transfer to [facility].    Addendum: Updated by nursing, who had concern of fever 100.4F and reported flexing of patient's feet. Upon my evaluation patient with neurological exam similar to prior evaluation. She remains confused, has a normal babinski reflex, is not currently with neurological posturing, is spontaneously moving all 4 extremities, PERRLA, is able to follow simple command, and otherwise is without appreciable gross focal neuro deficit. Rectal tylenol dose ordered. To obtain AM labs at this time and added additional procal and lactic acid. Called radiology to have patient's MRI formally read overnight for further review.    Addendum: Procal 0.63 with remainder of lab workup similar to prior. Given patient currently meeting SRIS w/ elevated procal, will provide IV rocephin dose overnight.

## 2024-01-09 NOTE — UTILIZATION REVIEW
Initial Clinical Review    Admission: Date/Time/Statement:   Admission Orders (From admission, onward)       Ordered        01/08/24 0136  Inpatient Admission  Once                          Orders Placed This Encounter   Procedures    Inpatient Admission     Standing Status:   Standing     Number of Occurrences:   1     Order Specific Question:   Level of Care     Answer:   Med Surg [16]     Order Specific Question:   Estimated length of stay     Answer:   More than 2 Midnights     Order Specific Question:   Certification     Answer:   I certify that inpatient services are medically necessary for this patient for a duration of greater than two midnights. See H&P and MD Progress Notes for additional information about the patient's course of treatment.     ED Arrival Information       Expected   -    Arrival   1/7/2024 19:01    Acuity   Emergent              Means of arrival   Ambulance    Escorted by   [facility]    Service   Hospitalist    Admission type   Emergency              Arrival complaint   stroke like symptoms             Chief Complaint   Patient presents with    CVA/TIA-like Symptoms     Pt came by EMS from home c/o stroke like symptoms beginning 6 days ago. Slurred speech, uneven gait, difficulty forming sentences. No prior Hx. Dx with UTI about a week ago.        Initial Presentation: 58 y.o. female to ED from home via EMS w/  PMH of epilepsy, anxiety and Stage III CKD who presents with stroke like symptoms.  Patient is poor historian due to confusion and aphasia.  Per patient's  she has become increasingly weak and confused over the last 6 days.  Of note patient has poor appetite, has been slurring words, and impaired memory.  She was initially treated for UTI by her PCP with during onset of symptoms.   CT of the head reveals tiny foci of acute subarachnoid hemorrhage.  case discussed w/ [facility] neurosurgery and ok to stay at [facility] . Also + COVID . Admitted IP status for stroke like sx plan for MRI  , neuro consult , PT IOT eval , neuro checks , tele , stat CTH is neuro changes , heparin DVT ppx . ELIESER CR 1.50 give IVF and daily BMP . COVID CXR pending , cbcb , cmp qd, trop , ck , BNP , CRP . Ipileptic syndromes s/p lobectomy 2007 stable on lamotrigine and topomax , seizure precautions .     PE: confused , dysarthria , weakness , cognition impaired      1/8 Neuro consult   CT head with bifrontal hyperdensities concerning for hemorrhage vs calcifications . I suspect she likely has bifrontal calcifications rather than hemorrhage given they appear relatively symmetric and similar in appearance to bilateral basal ganglia and cerebellar hemisphere calcifications as well. Plan MRI brain , routine EEG to r/o seizure activity . Continue her current AED regimen, topiramate 100mg BID and lamotrigine 200mg BID, and will check levels.     1/8 Quick Note   pt is restless, pulling at leads, pulled out 2 Ivs and is gazing ahead of nurse when nurse is speaking to her. Change in mental status compared to this morning . aphasia seems to be fluctuating throughout the day. Now, with agitation, concern for status. Case discussed with EMU fellow at [facility]. Pt approved for vEEG monitoring at [facility].  recommendation for pt to be transferred to [facility] for vEEG monitoring. Recommended to administer dose of Ativan to see if improvement.     Date: 1/9   Day 2: pt is pending transfer to [facility] . NG tube placed will continue with lamotrigine and Topamax .MRI revealed possible small acute stroke lacunar  Speech eval-pending     1/9 0206 Quick Note   s pending transfer to [facility] for VEEG monitoring, however [facility] without bed availability. Pt unable to answer questions . Remains confused . Pct 0.63 . Temp 100.4 and reported flexing of pts feet . Able to follow simple commands . Obtain am labs , additional pct and lactic acid . Iv rocephin dose.   ED Triage Vitals   Temperature Pulse Respirations Blood Pressure SpO2   01/07/24 1909  01/07/24 1907 01/07/24 1907 01/07/24 1907 01/07/24 1907   99.6 °F (37.6 °C) (!) 112 20 114/73 92 %      Temp Source Heart Rate Source Patient Position - Orthostatic VS BP Location FiO2 (%)   01/07/24 1909 01/07/24 1907 01/07/24 1907 01/07/24 1907 --   Oral Monitor Lying Right arm       Pain Score       01/08/24 0701       No Pain          Wt Readings from Last 1 Encounters:   05/16/23 77.6 kg (171 lb)     Additional Vital Signs:   Date/Time Temp Pulse Resp BP MAP (mmHg) SpO2 Calculated FIO2 (%) - Nasal Cannula Nasal Cannula O2 Flow Rate (L/min) O2 Device Patient Position - Orthostatic VS   01/09/24 11:02:08 -- 99 18 136/88 104 92 % -- -- -- --   01/09/24 07:36:25 99.4 °F (37.4 °C) 110 Abnormal  -- 121/74 90 93 % -- -- -- --   01/09/24 07:32:39 99.4 °F (37.4 °C) 111 Abnormal  19 95/69 78 91 % -- -- -- --   01/09/24 0700 99.4 °F (37.4 °C) -- 18 121/74 90 -- -- -- Nasal cannula Lying   01/09/24 0349 -- -- -- -- -- -- 36 4 L/min Nasal cannula --   01/09/24 03:07:18 100.4 °F (38 °C) 125 Abnormal  18 134/93 107 94 % 36 4 L/min Nasal cannula Lying   01/09/24 0200 -- 95 23 Abnormal  107/57 78 96 % 36 4 L/min Nasal cannula Lying   01/09/24 0100 -- 90 23 Abnormal  115/56 80 97 % 36 4 L/min Nasal cannula Lying   01/08/24 2300 -- 96 25 Abnormal  129/68 91 98 % 36 4 L/min Nasal cannula Lying   01/08/24 2200 -- 94 22 115/73 88 94 % 36 4 L/min Nasal cannula Lying   01/08/24 2100 -- 93 20 116/74 91 97 % 36 4 L/min Nasal cannula Lying   01/08/24 2030 -- 104 20 121/73 93 92 % 36 4 L/min Nasal cannula Sitting   01/08/24 1900 -- 86 18 112/70 -- 92 % 36 4 L/min Nasal cannula Lying   01/08/24 1513 99.5 °F (37.5 °C) 102 -- 114/70 84 94 % -- -- -- --   01/08/24 1120 98.6 °F (37 °C) -- -- -- -- -- -- -- -- --   01/08/24 1100 -- 92 20 113/64 81 95 % 24 1 L/min Nasal cannula --   01/08/24 0701 -- -- -- -- -- -- 32 3 L/min None (Room air) --   01/08/24 0700 99 °F (37.2 °C) 98 19 120/65 87 95 % -- -- Nasal cannula Lying   01/08/24 0500 -- 98 22  "111/58 80 92 % 28 2 L/min Nasal cannula Lying   01/08/24 0330 -- 88 21 105/68 82 95 % 28 2 L/min Nasal cannula Lying   01/08/24 0200 -- 94 22 109/59 78 95 % 28 2 L/min Nasal cannula Lying   01/08/24 0030 -- 92 22 112/73 87 94 % 28 2 L/min Nasal cannula Lying   01/07/24 2330 -- 100 20 109/66 84 94 % 28 2 L/min Nasal cannula Lying   01/07/24 2300 -- 97 20 114/74 88 95 % 28 2 L/min Nasal cannula Lying   01/07/24 2200 -- 97 22 104/67 79 95 % 28 2 L/min Nasal cannula Lying   01/07/24 2130 -- 93 20 125/71 91 93 % 28 2 L/min Nasal cannula Lying   01/07/24 2030 -- 96 22 116/63 84 92 % 28 2 L/min Nasal cannula Lying   01/07/24 2000 -- 111 Abnormal  22 100/58 75 87 % Abnormal  -- -- None (Room air) Sitting   01/07/24 1945 -- 111 Abnormal  22 110/70 84 91 % -- -- None (Room air) Lying   01/07/24 1909 99.6 °F (37.6 °C) -- -- -- -- -- --        Pertinent Labs/Diagnostic Test Results:   1/8 Echo   1/7 EKG Similarity:  No change  Interpretation:    Interpretation: normal    Rate:    ECG rate:  113    ECG rate assessment: tachycardic    Rhythm:    Rhythm: sinus tachycardia     MRI brain wo contrast   Final Result by Lavon Kowalski DO (01/09 0656)      Tiny focus of abnormal diffusion signal within the right basal ganglia involving the anterior limb of the internal capsule not clearly restricted on the ADC maps but suspicious for small acute lacunar infarct.      This examination was marked \"immediate notification\" in Epic in order to begin the standard process by which the radiology reading room liaison alerts the referring practitioner.      Workstation performed: [id number]         CTA head and neck with and without contrast   Final Result by Edward Goodman MD (01/08 0736)      No acute arterial vascular abnormality in the head or neck. No flow-limiting large vessel arterial vascular stenosis in the head or neck.      Tiny punctate foci of relative increased parenchymal density in the frontal lobes bilaterally, " "unchanged from 2 hours earlier and probably representing low-density parenchymal calcifications rather than parenchymal hemorrhage. Conservative management    with an additional follow-up noncontrast head CT is recommended in 24 to 48 hours.      Reidentified left temporal lobe resection. Dense basal ganglia and cerebellar calcifications again noted.      Groundglass and consolidative airspace opacities most suspicious for extensive pneumonia seen throughout the visualized mid and upper lung zones more dense on the right than on the left.      Findings are consistent with the preliminary report from [facility] which was provided shortly after completion of the exam. This examination was also marked \"immediate notification\" in Epic in order to begin the standard process by which the    radiology reading room liaison alerts the referring practitioner.               Workstation performed: [id number]         XR chest 1 view portable   Final Result by Jerardo Agrawal MD (01/08 0852)      Multifocal pneumonia and/or edema.                  Workstation performed: [id number]         CT head without contrast   Final Result by Edgardo Santoyo MD (01/07 2039)      Punctate hyperdense foci noted at both frontal region suspicious for tiny foci of acute subarachnoid hemorrhage. No prior studies are available for comparison. Short-term follow-up CT brain in 6 to 12 hours and/or MRI brain is recommended for further    evaluation.      Stable left temporal lobe encephalomalacia, compatible with the patient's history of prior left temporal lobectomy.               I personally discussed this study with ANGELIQUE HOUSE on 1/7/2024 8:31 PM.               Workstation performed: [id number]           Results from last 7 days   Lab Units 01/07/24 1916   SARS-COV-2  Positive*     Results from last 7 days   Lab Units 01/09/24  0343 01/08/24  0410 01/07/24  1916   WBC Thousand/uL 3.01* 2.37* 3.44*   HEMOGLOBIN g/dL 12.5 11.9 " 13.5   HEMATOCRIT % 38.1 35.1 40.7   PLATELETS Thousands/uL 291 251 308   NEUTROS ABS Thousands/µL  --   --  2.99         Results from last 7 days   Lab Units 01/09/24  0342 01/08/24  0410 01/07/24 1916   SODIUM mmol/L 143 139 136   POTASSIUM mmol/L 4.1 4.0 3.7   CHLORIDE mmol/L 117* 113* 107   CO2 mmol/L 19* 20* 20*   ANION GAP mmol/L 7 6 9   BUN mg/dL 18 18 22   CREATININE mg/dL 1.29 1.38* 1.50*   EGFR ml/min/1.73sq m 46 42 38   CALCIUM mg/dL 9.1 8.6 9.3   MAGNESIUM mg/dL 2.0  --   --      Results from last 7 days   Lab Units 01/07/24 1916   AST U/L 141*   ALT U/L 92*   ALK PHOS U/L 123*   TOTAL PROTEIN g/dL 6.5   ALBUMIN g/dL 3.7   TOTAL BILIRUBIN mg/dL 0.60   BILIRUBIN DIRECT mg/dL 0.23*     Results from last 7 days   Lab Units 01/07/24  1904   POC GLUCOSE mg/dl 138     Results from last 7 days   Lab Units 01/09/24  0342 01/08/24  0410 01/07/24 1916   GLUCOSE RANDOM mg/dL 102 109 131     Results from last 7 days   Lab Units 01/07/24 1916   HS TNI 0HR ng/L 12     Results from last 7 days   Lab Units 01/07/24 1916   PROTIME seconds 14.6*   INR  1.07   PTT seconds 38*     Results from last 7 days   Lab Units 01/09/24  0335   PROCALCITONIN ng/ml 0.63*     Results from last 7 days   Lab Units 01/09/24  0335 01/07/24 1916   LACTIC ACID mmol/L 1.0 1.8         Results from last 7 days   Lab Units 01/07/24  2256   CLARITY UA  Clear   COLOR UA  Light Yellow   SPEC GRAV UA  1.038*   PH UA  6.5   GLUCOSE UA mg/dl Negative   KETONES UA mg/dl Negative   BLOOD UA  Small*   PROTEIN UA mg/dl Trace*   NITRITE UA  Negative   BILIRUBIN UA  Negative   UROBILINOGEN UA (BE) mg/dl <2.0   LEUKOCYTES UA  Small*   WBC UA /hpf 4-10*   RBC UA /hpf 2-4*   BACTERIA UA /hpf None Seen   EPITHELIAL CELLS WET PREP /hpf Occasional     Results from last 7 days   Lab Units 01/07/24 1916   INFLUENZA A PCR  Negative   INFLUENZA B PCR  Negative   RSV PCR  Negative       Results from last 7 days   Lab Units 01/07/24  2256 01/07/24 1916  24  191   BLOOD CULTURE   --  No Growth at 24 hrs. No Growth at 24 hrs.   URINE CULTURE  Culture too young- will reincubate  --   --          ED Treatment:   Medication Administration from 2024 1901 to 2024 0252         Date/Time Order Dose Route Action     2024 1917 EST sodium chloride 0.9 % bolus 1,000 mL 1,000 mL Intravenous New Bag     2024 0949 EST lamoTRIgine (LaMICtal) tablet 200 mg 200 mg Oral Given     2024 0949 EST topiramate (TOPAMAX) tablet 100 mg 100 mg Oral Given     2024 0949 EST venlafaxine (EFFEXOR-XR) 24 hr capsule 75 mg 75 mg Oral Given     2024 0405 EST sodium chloride 0.9 % infusion 100 mL/hr Intravenous New Bag     2024 0949 EST docusate sodium (COLACE) capsule 100 mg 100 mg Oral Given     2024 2328 EST LORazepam (ATIVAN) injection 1 mg 1 mg Intravenous Given          Past Medical History:   Diagnosis Date    Hx of hypercalcemia     Lymphoma (HCC) 2021    Parathyroid disease (HCC)     Seizures (HCC)     Situational depression     24 yr old son  from drug overdose     Present on Admission:   Stroke-like symptoms   Localization-related symptomatic epilepsy and epileptic syndromes with complex partial seizures, intractable, without status epilepticus (HCC)   COVID   Acute-on-chronic kidney injury       Admitting Diagnosis: Abnormal CT of the head [R93.0]  Stroke-like symptoms [R29.90]  COVID-19 [U07.1]  Age/Sex: 58 y.o. female  Admission Orders:  Scheduled Medications:  [START ON 1/10/2024] aspirin, 81 mg, Oral, Daily  aspirin, 300 mg, Rectal, Once  atorvastatin, 20 mg, Oral, Daily With Dinner  cefTRIAXone, 1,000 mg, Intravenous, Q24H  docusate sodium, 100 mg, Oral, BID  heparin (porcine), 5,000 Units, Subcutaneous, Q8H SARTHAK  lacosamide, 200 mg, Intravenous, Q12H  lacosamide, 400 mg, Intravenous, Once  lamoTRIgine, 200 mg, Oral, BID  topiramate, 100 mg, Oral, BID  venlafaxine, 75 mg, Oral, Daily      Continuous IV  Infusions:  sodium chloride, 100 mL/hr, Intravenous, Continuous      PRN Meds:  acetaminophen, 650 mg, Oral, Q6H PRN  ondansetron, 4 mg, Intravenous, Q6H PRN    Cont OBS   Restraints non violent   Neuro checks   NPO   Seizure precautions  Fall precautions  Tele   PT OT eval   I&O   Reg diet   Up and OOB     IP CONSULT TO NEUROCRITICAL CARE  IP CONSULT TO NEUROLOGY    Network Utilization Review Department  ATTENTION: Please call with any questions or concerns to [phone] and carefully listen to the prompts so that you are directed to the right person. All voicemails are confidential.   For Discharge needs, contact Care Management DC Support Team at [phone] opt. 2  Send all requests for admission clinical reviews, approved or denied determinations and any other requests to dedicated fax number below belonging to the [facility] where the patient is receiving treatment. List of dedicated fax numbers for the Facilities:  FACILITY NAME UR FAX NUMBER   ADMISSION DENIALS (Administrative/Medical Necessity) [phone]   DISCHARGE SUPPORT TEAM (NETWORK) [phone]   PARENT CHILD HEALTH (Maternity/NICU/Pediatrics) [phone]   [facility] [phone]   [facility] [phone]   [facility] [phone]   [facility] [phone]   [facility] [phone]   [facility] [phone]   [facility] [phone]   [facility] [phone]   [facility] [phone]   [facility] [phone]   [facility] [phone]

## 2024-01-09 NOTE — TELEPHONE ENCOUNTER
Dr.Kohler Paige LUCERO.     Patient is currently at [facility]. Awaiting transfer to [facility].     Spoke to Chata in PACS, aware of bed need. Currently limited capacity, staffing issues and multiple bed needs hospital wide. Will work to expedite as best as they can.

## 2024-01-09 NOTE — ED NOTES
Admitting provider at bedside to see patient at this time.      Cathryn Silverio RN  01/09/24 0697

## 2024-01-09 NOTE — ASSESSMENT & PLAN NOTE
Patient is covid positive  AEB tachycardia, febrile, leukopenia   Blood cultures negative from 1/7  UA negative    Given change in neuro status, fevers, tachycardia. Patient may benefit from lumbar puncture    Plan:  Continue infectious work up  Repeat blood cx  CBC, lactate, procal now  Broaden antibiotics  Consider LP

## 2024-01-09 NOTE — ASSESSMENT & PLAN NOTE
Patient has history of seizures, outpatient maintained on lamictal and topamax  S/p left temporal lobectomy for seizure d/o  Neurology consulted, she was started on vimpat this morning given concern for breakthrough seizures    Plan:  Continue vimpat, lamictal, topamax  Neurology following  Plan for eventual transfer to hospitals for vEEG

## 2024-01-09 NOTE — ASSESSMENT & PLAN NOTE
"1/7 CTA HN on admission - \"Tiny punctate foci of relative increased parenchymal density in the frontal lobes bilaterally, unchanged from 2 hours earlier and probably representing low-density parenchymal calcifications rather than parenchymal hemorrhage. Conservative management with an additional follow-up noncontrast head CT is recommended in 24 to 48 hours.\"    Plan:  Will discuss with neurology regarding repeat imaging  "

## 2024-01-09 NOTE — ASSESSMENT & PLAN NOTE
Likely from volume depletion  Has since resolved with IV fluids  Kidney function near baseline at approximately 1.2

## 2024-01-09 NOTE — PLAN OF CARE
Problem: Nutrition  Goal: Nutrition/Hydration status is improving  Description: Monitor and assess patient's nutrition/hydration status for malnutrition (ex- brittle hair, bruises, dry skin, pale skin and conjunctiva, muscle wasting, smooth red tongue, and disorientation). Collaborate with interdisciplinary team and initiate plan and interventions as ordered.  Monitor patient's weight and dietary intake as ordered or per policy. Utilize nutrition screening tool and intervene per policy. Determine patient's food preferences and provide high-protein, high-caloric foods as appropriate.     - Assist patient with eating.  - Allow adequate time for meals.  - Encourage patient to take dietary supplement as ordered.  - Collaborate with clinical nutritionist.  - Include patient/family/caregiver in decisions related to nutrition.  Outcome: Not Progressing   NPO, will monitor diet advancement/tolerance

## 2024-01-09 NOTE — UTILIZATION REVIEW
NOTIFICATION OF INPATIENT ADMISSION   AUTHORIZATION REQUEST   SERVICING FACILITY:   [place] [address]  Tax ID: [id number]  NPI: [id number] ATTENDING PROVIDER:  Attending Name and NPI#: Garrett Zapata Md [[id number]]  Address: [address] [place] [address]  Phone: [phone]     ADMISSION INFORMATION:  Place of Service: Inpatient [facility]  Place of Service Code: 21  Inpatient Admission Date/Time: 1/8/24  1:36 AM  Discharge Date/Time: No discharge date for patient encounter.  Admitting Diagnosis Code/Description:  Abnormal CT of the head [R93.0]  Stroke-like symptoms [R29.90]  COVID-19 [U07.1]     UTILIZATION REVIEW CONTACT:  Andreea Elizabeth Utilization   Network Utilization Review Department  Phone: [phone]  Fax [phone]  Email: [email or web address]  Contact for approvals/pending authorizations, clinical reviews, and discharge.     PHYSICIAN ADVISORY SERVICES:  Medical Necessity Denial & [id number]-cv-[id number] Review  Phone: [phone]  Fax: [phone]  Email: [email or web address]     DISCHARGE SUPPORT TEAM:  For Patients Discharge Needs & Updates  Phone: [phone] opt. 2 Fax: [phone]  Email: [email or web address]

## 2024-01-10 ENCOUNTER — APPOINTMENT (OUTPATIENT)
Dept: RADIOLOGY | Facility: HOSPITAL | Age: 59
DRG: 003 | End: 2024-01-10
Payer: COMMERCIAL

## 2024-01-10 ENCOUNTER — APPOINTMENT (INPATIENT)
Dept: RADIOLOGY | Facility: HOSPITAL | Age: 59
DRG: 003 | End: 2024-01-10
Payer: COMMERCIAL

## 2024-01-10 ENCOUNTER — APPOINTMENT (INPATIENT)
Dept: NON INVASIVE DIAGNOSTICS | Facility: HOSPITAL | Age: 59
DRG: 871 | End: 2024-01-10
Payer: COMMERCIAL

## 2024-01-10 ENCOUNTER — HOSPITAL ENCOUNTER (INPATIENT)
Facility: HOSPITAL | Age: 59
DRG: 003 | End: 2024-01-10
Attending: PSYCHIATRY & NEUROLOGY | Admitting: STUDENT IN AN ORGANIZED HEALTH CARE EDUCATION/TRAINING PROGRAM
Payer: COMMERCIAL

## 2024-01-10 VITALS
TEMPERATURE: 97.5 F | DIASTOLIC BLOOD PRESSURE: 79 MMHG | OXYGEN SATURATION: 100 % | BODY MASS INDEX: 26.16 KG/M2 | HEIGHT: 68 IN | WEIGHT: 172.62 LBS | HEART RATE: 87 BPM | SYSTOLIC BLOOD PRESSURE: 135 MMHG | RESPIRATION RATE: 34 BRPM

## 2024-01-10 DIAGNOSIS — R00.0 SINUS TACHYCARDIA: ICD-10-CM

## 2024-01-10 DIAGNOSIS — J18.9 HOSPITAL-ACQUIRED PNEUMONIA: ICD-10-CM

## 2024-01-10 DIAGNOSIS — O22.30 DVT (DEEP VEIN THROMBOSIS) IN PREGNANCY: ICD-10-CM

## 2024-01-10 DIAGNOSIS — J96.01 ACUTE RESPIRATORY FAILURE WITH HYPOXIA (HCC): Primary | ICD-10-CM

## 2024-01-10 DIAGNOSIS — D64.9 ANEMIA: ICD-10-CM

## 2024-01-10 DIAGNOSIS — R00.0 TACHYCARDIA: ICD-10-CM

## 2024-01-10 DIAGNOSIS — S61.409A: ICD-10-CM

## 2024-01-10 DIAGNOSIS — I63.9 EMBOLIC STROKE (HCC): ICD-10-CM

## 2024-01-10 DIAGNOSIS — B37.9 CANDIDIASIS: ICD-10-CM

## 2024-01-10 DIAGNOSIS — R57.8 HEMORRHAGIC SHOCK (HCC): ICD-10-CM

## 2024-01-10 DIAGNOSIS — N19 UREMIA: ICD-10-CM

## 2024-01-10 DIAGNOSIS — I95.9 HYPOTENSION, UNSPECIFIED HYPOTENSION TYPE: ICD-10-CM

## 2024-01-10 DIAGNOSIS — N93.9 VAGINAL BLEEDING: ICD-10-CM

## 2024-01-10 DIAGNOSIS — K92.2 GASTROINTESTINAL HEMORRHAGE, UNSPECIFIED GASTROINTESTINAL HEMORRHAGE TYPE: ICD-10-CM

## 2024-01-10 DIAGNOSIS — U07.1 COVID: ICD-10-CM

## 2024-01-10 DIAGNOSIS — J18.9 PNEUMONIA OF BOTH LUNGS DUE TO INFECTIOUS ORGANISM, UNSPECIFIED PART OF LUNG: ICD-10-CM

## 2024-01-10 DIAGNOSIS — K25.9 GASTRIC ULCER: ICD-10-CM

## 2024-01-10 DIAGNOSIS — D61.818 PANCYTOPENIA (HCC): ICD-10-CM

## 2024-01-10 DIAGNOSIS — R93.0 ABNORMAL CT OF THE HEAD: ICD-10-CM

## 2024-01-10 DIAGNOSIS — L03.311 ABDOMINAL WALL CELLULITIS: ICD-10-CM

## 2024-01-10 DIAGNOSIS — J12.9 VIRAL PNEUMONIA: ICD-10-CM

## 2024-01-10 DIAGNOSIS — I82.409 DVT (DEEP VENOUS THROMBOSIS) (HCC): ICD-10-CM

## 2024-01-10 DIAGNOSIS — R73.9 HYPERGLYCEMIA: ICD-10-CM

## 2024-01-10 DIAGNOSIS — Z71.89 GOALS OF CARE, COUNSELING/DISCUSSION: ICD-10-CM
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
## 2024-01-10 DIAGNOSIS — Z93.0 TRACHEOSTOMY PRESENT (HCC): ICD-10-CM

## 2024-01-10 DIAGNOSIS — R33.9 URINARY RETENTION: ICD-10-CM

## 2024-01-10 DIAGNOSIS — K56.2 CECAL VOLVULUS (HCC): ICD-10-CM

## 2024-01-10 DIAGNOSIS — K92.0 HEMATEMESIS: ICD-10-CM

## 2024-01-10 DIAGNOSIS — B49 FUNGEMIA: ICD-10-CM

## 2024-01-10 DIAGNOSIS — R04.0 EPISTAXIS: ICD-10-CM

## 2024-01-10 DIAGNOSIS — J18.9 PNEUMONIA: ICD-10-CM

## 2024-01-10 DIAGNOSIS — I95.9 HYPOTENSION: ICD-10-CM

## 2024-01-10 DIAGNOSIS — N18.32 STAGE 3B CHRONIC KIDNEY DISEASE (CKD) (HCC): ICD-10-CM

## 2024-01-10 DIAGNOSIS — G93.40 ENCEPHALOPATHY ACUTE: ICD-10-CM

## 2024-01-10 DIAGNOSIS — A41.9 SEPSIS (HCC): ICD-10-CM

## 2024-01-10 DIAGNOSIS — E07.9 THYROID MASS: ICD-10-CM

## 2024-01-10 DIAGNOSIS — N17.9 ACUTE KIDNEY INJURY (HCC): ICD-10-CM

## 2024-01-10 LAB
ALBUMIN SERPL BCP-MCNC: 3.3 G/DL (ref 3.5–5)
ALP SERPL-CCNC: 97 U/L (ref 34–104)
ALT SERPL W P-5'-P-CCNC: 87 U/L (ref 7–52)
AMMONIA PLAS-SCNC: 46 UMOL/L (ref 18–72)
ANION GAP SERPL CALCULATED.3IONS-SCNC: 6 MMOL/L
ANION GAP SERPL CALCULATED.3IONS-SCNC: 7 MMOL/L
ANION GAP SERPL CALCULATED.3IONS-SCNC: 8 MMOL/L
ANION GAP SERPL CALCULATED.3IONS-SCNC: 9 MMOL/L
AST SERPL W P-5'-P-CCNC: 125 U/L (ref 13–39)
ATRIAL RATE: 56 BPM
ATRIAL RATE: 56 BPM
ATRIAL RATE: 83 BPM
BACTERIA UR CULT: ABNORMAL
BASOPHILS # BLD AUTO: 0.01 THOUSANDS/ÂΜL (ref 0–0.1)
BASOPHILS NFR BLD AUTO: 1 % (ref 0–1)
BILIRUB SERPL-MCNC: 0.59 MG/DL (ref 0.2–1)
BUN SERPL-MCNC: 21 MG/DL (ref 5–25)
BUN SERPL-MCNC: 24 MG/DL (ref 5–25)
BUN SERPL-MCNC: 24 MG/DL (ref 5–25)
BUN SERPL-MCNC: 28 MG/DL (ref 5–25)
CALCIUM ALBUM COR SERPL-MCNC: 9.4 MG/DL (ref 8.3–10.1)
CALCIUM SERPL-MCNC: 8.8 MG/DL (ref 8.4–10.2)
CALCIUM SERPL-MCNC: 9.2 MG/DL (ref 8.4–10.2)
CALCIUM SERPL-MCNC: 9.3 MG/DL (ref 8.4–10.2)
CALCIUM SERPL-MCNC: 9.4 MG/DL (ref 8.4–10.2)
CHLORIDE SERPL-SCNC: 121 MMOL/L (ref 96–108)
CHLORIDE SERPL-SCNC: 121 MMOL/L (ref 96–108)
CHLORIDE SERPL-SCNC: 122 MMOL/L (ref 96–108)
CHLORIDE SERPL-SCNC: 122 MMOL/L (ref 96–108)
CO2 SERPL-SCNC: 18 MMOL/L (ref 21–32)
CO2 SERPL-SCNC: 18 MMOL/L (ref 21–32)
CO2 SERPL-SCNC: 20 MMOL/L (ref 21–32)
CO2 SERPL-SCNC: 23 MMOL/L (ref 21–32)
CREAT SERPL-MCNC: 1.2 MG/DL (ref 0.6–1.3)
CREAT SERPL-MCNC: 1.2 MG/DL (ref 0.6–1.3)
CREAT SERPL-MCNC: 1.3 MG/DL (ref 0.6–1.3)
CREAT SERPL-MCNC: 1.33 MG/DL (ref 0.6–1.3)
CRP SERPL QL: 110.4 MG/L
EOSINOPHIL # BLD AUTO: 0 THOUSAND/ÂΜL (ref 0–0.61)
EOSINOPHIL NFR BLD AUTO: 0 % (ref 0–6)
ERYTHROCYTE [DISTWIDTH] IN BLOOD BY AUTOMATED COUNT: 13.5 % (ref 11.6–15.1)
GFR SERPL CREATININE-BSD FRML MDRD: 44 ML/MIN/1.73SQ M
GFR SERPL CREATININE-BSD FRML MDRD: 45 ML/MIN/1.73SQ M
GFR SERPL CREATININE-BSD FRML MDRD: 50 ML/MIN/1.73SQ M
GFR SERPL CREATININE-BSD FRML MDRD: 50 ML/MIN/1.73SQ M
GLUCOSE SERPL-MCNC: 116 MG/DL (ref 65–140)
GLUCOSE SERPL-MCNC: 117 MG/DL (ref 65–140)
GLUCOSE SERPL-MCNC: 136 MG/DL (ref 65–140)
GLUCOSE SERPL-MCNC: 138 MG/DL (ref 65–140)
GLUCOSE SERPL-MCNC: 155 MG/DL (ref 65–140)
HBV CORE AB SER QL: NORMAL
HBV CORE IGM SER QL: NORMAL
HBV SURFACE AG SER QL: NORMAL
HCT VFR BLD AUTO: 36 % (ref 34.8–46.1)
HCV AB SER QL: NORMAL
HEPARIN CF II AG PPP IA-MCNC: 0.53 IU/ML
HGB BLD-MCNC: 11.9 G/DL (ref 11.5–15.4)
IMM GRANULOCYTES # BLD AUTO: 0.03 THOUSAND/UL (ref 0–0.2)
IMM GRANULOCYTES NFR BLD AUTO: 2 % (ref 0–2)
LYMPHOCYTES # BLD AUTO: 0.08 THOUSANDS/ÂΜL (ref 0.6–4.47)
LYMPHOCYTES NFR BLD AUTO: 4 % (ref 14–44)
MAGNESIUM SERPL-MCNC: 2.2 MG/DL (ref 1.9–2.7)
MCH RBC QN AUTO: 28.9 PG (ref 26.8–34.3)
MCHC RBC AUTO-ENTMCNC: 33.1 G/DL (ref 31.4–37.4)
MCV RBC AUTO: 87 FL (ref 82–98)
MONOCYTES # BLD AUTO: 0.16 THOUSAND/ÂΜL (ref 0.17–1.22)
MONOCYTES NFR BLD AUTO: 8 % (ref 4–12)
NEUTROPHILS # BLD AUTO: 1.71 THOUSANDS/ÂΜL (ref 1.85–7.62)
NEUTS SEG NFR BLD AUTO: 85 % (ref 43–75)
NRBC BLD AUTO-RTO: 0 /100 WBCS
P AXIS: 48 DEGREES
P AXIS: 53 DEGREES
P AXIS: 61 DEGREES
PHOSPHATE SERPL-MCNC: 2.8 MG/DL (ref 2.7–4.5)
PLATELET # BLD AUTO: 293 THOUSANDS/UL (ref 149–390)
PMV BLD AUTO: 9.6 FL (ref 8.9–12.7)
POTASSIUM SERPL-SCNC: 4 MMOL/L (ref 3.5–5.3)
POTASSIUM SERPL-SCNC: 4 MMOL/L (ref 3.5–5.3)
POTASSIUM SERPL-SCNC: 4.3 MMOL/L (ref 3.5–5.3)
POTASSIUM SERPL-SCNC: 4.3 MMOL/L (ref 3.5–5.3)
PR INTERVAL: 163 MS
PR INTERVAL: 167 MS
PR INTERVAL: 167 MS
PROCALCITONIN SERPL-MCNC: 0.6 NG/ML
PROT SERPL-MCNC: 5.8 G/DL (ref 6.4–8.4)
QRS AXIS: 21 DEGREES
QRS AXIS: 32 DEGREES
QRS AXIS: 49 DEGREES
QRSD INTERVAL: 79 MS
QRSD INTERVAL: 79 MS
QRSD INTERVAL: 83 MS
QT INTERVAL: 358 MS
QT INTERVAL: 433 MS
QT INTERVAL: 433 MS
QTC INTERVAL: 418 MS
QTC INTERVAL: 418 MS
QTC INTERVAL: 421 MS
RBC # BLD AUTO: 4.12 MILLION/UL (ref 3.81–5.12)
SODIUM SERPL-SCNC: 148 MMOL/L (ref 135–147)
SODIUM SERPL-SCNC: 148 MMOL/L (ref 135–147)
SODIUM SERPL-SCNC: 149 MMOL/L (ref 135–147)
SODIUM SERPL-SCNC: 150 MMOL/L (ref 135–147)
T WAVE AXIS: 30 DEGREES
T WAVE AXIS: 44 DEGREES
T WAVE AXIS: 61 DEGREES
VENTRICULAR RATE: 56 BPM
VENTRICULAR RATE: 56 BPM
VENTRICULAR RATE: 83 BPM
WBC # BLD AUTO: 1.99 THOUSAND/UL (ref 4.31–10.16)

## 2024-01-10 PROCEDURE — 82948 REAGENT STRIP/BLOOD GLUCOSE: CPT

## 2024-01-10 PROCEDURE — XW033H5 INTRODUCTION OF TOCILIZUMAB INTO PERIPHERAL VEIN, PERCUTANEOUS APPROACH, NEW TECHNOLOGY GROUP 5: ICD-10-PCS | Performed by: STUDENT IN AN ORGANIZED HEALTH CARE EDUCATION/TRAINING PROGRAM

## 2024-01-10 PROCEDURE — 94760 N-INVAS EAR/PLS OXIMETRY 1: CPT

## 2024-01-10 PROCEDURE — 99238 HOSP IP/OBS DSCHRG MGMT 30/<: CPT | Performed by: STUDENT IN AN ORGANIZED HEALTH CARE EDUCATION/TRAINING PROGRAM

## 2024-01-10 PROCEDURE — 71045 X-RAY EXAM CHEST 1 VIEW: CPT

## 2024-01-10 PROCEDURE — NC001 PR NO CHARGE

## 2024-01-10 PROCEDURE — 82140 ASSAY OF AMMONIA: CPT

## 2024-01-10 PROCEDURE — 99215 OFFICE O/P EST HI 40 MIN: CPT | Performed by: PSYCHIATRY & NEUROLOGY

## 2024-01-10 PROCEDURE — 84100 ASSAY OF PHOSPHORUS: CPT

## 2024-01-10 PROCEDURE — 70553 MRI BRAIN STEM W/O & W/DYE: CPT

## 2024-01-10 PROCEDURE — 93010 ELECTROCARDIOGRAM REPORT: CPT | Performed by: INTERNAL MEDICINE

## 2024-01-10 PROCEDURE — 83735 ASSAY OF MAGNESIUM: CPT

## 2024-01-10 PROCEDURE — 93005 ELECTROCARDIOGRAM TRACING: CPT

## 2024-01-10 PROCEDURE — 92610 EVALUATE SWALLOWING FUNCTION: CPT

## 2024-01-10 PROCEDURE — 85025 COMPLETE CBC W/AUTO DIFF WBC: CPT

## 2024-01-10 PROCEDURE — 86140 C-REACTIVE PROTEIN: CPT

## 2024-01-10 PROCEDURE — 87449 NOS EACH ORGANISM AG IA: CPT | Performed by: NURSE PRACTITIONER

## 2024-01-10 PROCEDURE — XW033E5 INTRODUCTION OF REMDESIVIR ANTI-INFECTIVE INTO PERIPHERAL VEIN, PERCUTANEOUS APPROACH, NEW TECHNOLOGY GROUP 5: ICD-10-PCS | Performed by: STUDENT IN AN ORGANIZED HEALTH CARE EDUCATION/TRAINING PROGRAM

## 2024-01-10 PROCEDURE — 80053 COMPREHEN METABOLIC PANEL: CPT

## 2024-01-10 PROCEDURE — 84145 PROCALCITONIN (PCT): CPT

## 2024-01-10 PROCEDURE — 85520 HEPARIN ASSAY: CPT

## 2024-01-10 PROCEDURE — 80048 BASIC METABOLIC PNL TOTAL CA: CPT

## 2024-01-10 PROCEDURE — NC001 PR NO CHARGE: Performed by: NURSE PRACTITIONER

## 2024-01-10 PROCEDURE — A9585 GADOBUTROL INJECTION: HCPCS | Performed by: STUDENT IN AN ORGANIZED HEALTH CARE EDUCATION/TRAINING PROGRAM

## 2024-01-10 RX ORDER — ONDANSETRON 2 MG/ML
4 INJECTION INTRAMUSCULAR; INTRAVENOUS EVERY 6 HOURS PRN
Status: DISCONTINUED | OUTPATIENT
Start: 2024-01-10 | End: 2024-03-07

## 2024-01-10 RX ORDER — DEXMEDETOMIDINE HYDROCHLORIDE 4 UG/ML
.1-.7 INJECTION, SOLUTION INTRAVENOUS
Status: DISCONTINUED | OUTPATIENT
Start: 2024-01-10 | End: 2024-01-10 | Stop reason: HOSPADM

## 2024-01-10 RX ORDER — VENLAFAXINE HYDROCHLORIDE 37.5 MG/1
75 CAPSULE, EXTENDED RELEASE ORAL DAILY
Status: CANCELLED | OUTPATIENT
Start: 2024-01-11

## 2024-01-10 RX ORDER — FENTANYL CITRATE 50 UG/ML
25 INJECTION, SOLUTION INTRAMUSCULAR; INTRAVENOUS
Status: DISCONTINUED | OUTPATIENT
Start: 2024-01-10 | End: 2024-01-11

## 2024-01-10 RX ORDER — BISACODYL 10 MG
10 SUPPOSITORY, RECTAL RECTAL DAILY PRN
Status: DISCONTINUED | OUTPATIENT
Start: 2024-01-10 | End: 2024-01-10 | Stop reason: HOSPADM

## 2024-01-10 RX ORDER — ACETAMINOPHEN 325 MG/1
650 TABLET ORAL EVERY 6 HOURS PRN
Status: DISCONTINUED | OUTPATIENT
Start: 2024-01-10 | End: 2024-02-19

## 2024-01-10 RX ORDER — DEXTROSE MONOHYDRATE 50 MG/ML
50 INJECTION, SOLUTION INTRAVENOUS CONTINUOUS
Status: CANCELLED | OUTPATIENT
Start: 2024-01-10 | End: 2024-01-10

## 2024-01-10 RX ORDER — DEXMEDETOMIDINE HYDROCHLORIDE 4 UG/ML
.1-1.5 INJECTION, SOLUTION INTRAVENOUS
Status: DISCONTINUED | OUTPATIENT
Start: 2024-01-10 | End: 2024-01-15

## 2024-01-10 RX ORDER — CHLORHEXIDINE GLUCONATE ORAL RINSE 1.2 MG/ML
15 SOLUTION DENTAL EVERY 12 HOURS SCHEDULED
Status: DISCONTINUED | OUTPATIENT
Start: 2024-01-10 | End: 2024-02-07

## 2024-01-10 RX ORDER — BISACODYL 10 MG
10 SUPPOSITORY, RECTAL RECTAL DAILY PRN
Status: CANCELLED | OUTPATIENT
Start: 2024-01-10

## 2024-01-10 RX ORDER — POLYETHYLENE GLYCOL 3350 17 G/17G
17 POWDER, FOR SOLUTION ORAL DAILY
Status: DISCONTINUED | OUTPATIENT
Start: 2024-01-11 | End: 2024-02-20

## 2024-01-10 RX ORDER — VENLAFAXINE HYDROCHLORIDE 75 MG/1
75 CAPSULE, EXTENDED RELEASE ORAL DAILY
Status: DISCONTINUED | OUTPATIENT
Start: 2024-01-11 | End: 2024-01-11

## 2024-01-10 RX ORDER — AMOXICILLIN 250 MG
2 CAPSULE ORAL 2 TIMES DAILY
Status: CANCELLED | OUTPATIENT
Start: 2024-01-11

## 2024-01-10 RX ORDER — DEXTROSE MONOHYDRATE 50 MG/ML
50 INJECTION, SOLUTION INTRAVENOUS CONTINUOUS
Status: DISCONTINUED | OUTPATIENT
Start: 2024-01-10 | End: 2024-01-10 | Stop reason: HOSPADM

## 2024-01-10 RX ORDER — CHLORHEXIDINE GLUCONATE ORAL RINSE 1.2 MG/ML
15 SOLUTION DENTAL EVERY 12 HOURS SCHEDULED
Status: CANCELLED | OUTPATIENT
Start: 2024-01-10

## 2024-01-10 RX ORDER — AMOXICILLIN 250 MG
2 CAPSULE ORAL 2 TIMES DAILY
Status: DISCONTINUED | OUTPATIENT
Start: 2024-01-11 | End: 2024-02-05

## 2024-01-10 RX ORDER — LAMOTRIGINE 100 MG/1
200 TABLET ORAL 2 TIMES DAILY
Status: DISCONTINUED | OUTPATIENT
Start: 2024-01-11 | End: 2024-01-12

## 2024-01-10 RX ORDER — ACETAMINOPHEN 325 MG/1
650 TABLET ORAL EVERY 6 HOURS PRN
Status: CANCELLED | OUTPATIENT
Start: 2024-01-10

## 2024-01-10 RX ORDER — ONDANSETRON 2 MG/ML
4 INJECTION INTRAMUSCULAR; INTRAVENOUS EVERY 6 HOURS PRN
Status: CANCELLED | OUTPATIENT
Start: 2024-01-10

## 2024-01-10 RX ORDER — GADOBUTROL 604.72 MG/ML
7 INJECTION INTRAVENOUS
Status: COMPLETED | OUTPATIENT
Start: 2024-01-10 | End: 2024-01-10

## 2024-01-10 RX ORDER — ENOXAPARIN SODIUM 100 MG/ML
1 INJECTION SUBCUTANEOUS ONCE
Status: DISCONTINUED | OUTPATIENT
Start: 2024-01-10 | End: 2024-01-10

## 2024-01-10 RX ORDER — CEFTRIAXONE 2 G/50ML
2000 INJECTION, SOLUTION INTRAVENOUS EVERY 12 HOURS
Status: DISCONTINUED | OUTPATIENT
Start: 2024-01-10 | End: 2024-01-10 | Stop reason: HOSPADM

## 2024-01-10 RX ORDER — HEPARIN SODIUM 5000 [USP'U]/ML
5000 INJECTION, SOLUTION INTRAVENOUS; SUBCUTANEOUS EVERY 8 HOURS SCHEDULED
Status: CANCELLED | OUTPATIENT
Start: 2024-01-10

## 2024-01-10 RX ORDER — LAMOTRIGINE 100 MG/1
200 TABLET ORAL 2 TIMES DAILY
Status: CANCELLED | OUTPATIENT
Start: 2024-01-11

## 2024-01-10 RX ORDER — ATORVASTATIN CALCIUM 10 MG/1
20 TABLET, FILM COATED ORAL
Status: CANCELLED | OUTPATIENT
Start: 2024-01-11

## 2024-01-10 RX ORDER — CEFTRIAXONE 2 G/50ML
2000 INJECTION, SOLUTION INTRAVENOUS EVERY 12 HOURS
Status: CANCELLED | OUTPATIENT
Start: 2024-01-11

## 2024-01-10 RX ORDER — DEXAMETHASONE SODIUM PHOSPHATE 10 MG/ML
6 INJECTION, SOLUTION INTRAMUSCULAR; INTRAVENOUS DAILY
Status: DISCONTINUED | OUTPATIENT
Start: 2024-01-11 | End: 2024-01-13

## 2024-01-10 RX ORDER — DEXAMETHASONE SODIUM PHOSPHATE 10 MG/ML
6 INJECTION, SOLUTION INTRAMUSCULAR; INTRAVENOUS DAILY
Status: CANCELLED | OUTPATIENT
Start: 2024-01-11

## 2024-01-10 RX ORDER — SODIUM CHLORIDE, SODIUM GLUCONATE, SODIUM ACETATE, POTASSIUM CHLORIDE, MAGNESIUM CHLORIDE, SODIUM PHOSPHATE, DIBASIC, AND POTASSIUM PHOSPHATE .53; .5; .37; .037; .03; .012; .00082 G/100ML; G/100ML; G/100ML; G/100ML; G/100ML; G/100ML; G/100ML
100 INJECTION, SOLUTION INTRAVENOUS CONTINUOUS
Status: DISCONTINUED | OUTPATIENT
Start: 2024-01-10 | End: 2024-01-11

## 2024-01-10 RX ORDER — ATORVASTATIN CALCIUM 20 MG/1
20 TABLET, FILM COATED ORAL
Status: DISCONTINUED | OUTPATIENT
Start: 2024-01-11 | End: 2024-01-14

## 2024-01-10 RX ORDER — BISACODYL 10 MG
10 SUPPOSITORY, RECTAL RECTAL DAILY PRN
Status: DISCONTINUED | OUTPATIENT
Start: 2024-01-10 | End: 2024-02-15

## 2024-01-10 RX ORDER — HEPARIN SODIUM 5000 [USP'U]/ML
5000 INJECTION, SOLUTION INTRAVENOUS; SUBCUTANEOUS EVERY 8 HOURS SCHEDULED
Status: DISCONTINUED | OUTPATIENT
Start: 2024-01-10 | End: 2024-01-10 | Stop reason: HOSPADM

## 2024-01-10 RX ORDER — POLYETHYLENE GLYCOL 3350 17 G/17G
17 POWDER, FOR SOLUTION ORAL DAILY
Status: CANCELLED | OUTPATIENT
Start: 2024-01-11

## 2024-01-10 RX ORDER — FENTANYL CITRATE 50 UG/ML
25 INJECTION, SOLUTION INTRAMUSCULAR; INTRAVENOUS ONCE
Status: COMPLETED | OUTPATIENT
Start: 2024-01-10 | End: 2024-01-10

## 2024-01-10 RX ORDER — FENTANYL CITRATE/PF 50 MCG/ML
SYRINGE (ML) INJECTION
Status: COMPLETED
Start: 2024-01-10 | End: 2024-01-10

## 2024-01-10 RX ORDER — DEXMEDETOMIDINE HYDROCHLORIDE 4 UG/ML
.1-.7 INJECTION, SOLUTION INTRAVENOUS
Status: CANCELLED | OUTPATIENT
Start: 2024-01-10

## 2024-01-10 RX ADMIN — SENNOSIDES AND DOCUSATE SODIUM 2 TABLET: 50; 8.6 TABLET ORAL at 08:52

## 2024-01-10 RX ADMIN — DEXAMETHASONE SODIUM PHOSPHATE 6 MG: 10 INJECTION, SOLUTION INTRAMUSCULAR; INTRAVENOUS at 08:53

## 2024-01-10 RX ADMIN — TOPIRAMATE 150 MG: 100 TABLET, FILM COATED ORAL at 17:56

## 2024-01-10 RX ADMIN — ACYCLOVIR SODIUM 650 MG: 50 INJECTION, SOLUTION INTRAVENOUS at 20:57

## 2024-01-10 RX ADMIN — LAMOTRIGINE 200 MG: 100 TABLET ORAL at 17:55

## 2024-01-10 RX ADMIN — FENTANYL CITRATE 25 MCG: 50 INJECTION, SOLUTION INTRAMUSCULAR; INTRAVENOUS at 20:35

## 2024-01-10 RX ADMIN — POLYETHYLENE GLYCOL 3350 17 G: 17 POWDER, FOR SOLUTION ORAL at 08:53

## 2024-01-10 RX ADMIN — VANCOMYCIN HYDROCHLORIDE 1500 MG: 1 INJECTION, POWDER, LYOPHILIZED, FOR SOLUTION INTRAVENOUS at 23:00

## 2024-01-10 RX ADMIN — REMDESIVIR 100 MG: 100 INJECTION, POWDER, LYOPHILIZED, FOR SOLUTION INTRAVENOUS at 16:59

## 2024-01-10 RX ADMIN — LAMOTRIGINE 200 MG: 100 TABLET ORAL at 08:52

## 2024-01-10 RX ADMIN — ATORVASTATIN CALCIUM 20 MG: 20 TABLET, FILM COATED ORAL at 16:59

## 2024-01-10 RX ADMIN — CHLORHEXIDINE GLUCONATE 0.12% ORAL RINSE 15 ML: 1.2 LIQUID ORAL at 08:52

## 2024-01-10 RX ADMIN — AMPICILLIN SODIUM 2000 MG: 2 INJECTION, POWDER, FOR SOLUTION INTRAVENOUS at 22:00

## 2024-01-10 RX ADMIN — VENLAFAXINE HYDROCHLORIDE 75 MG: 75 CAPSULE, EXTENDED RELEASE ORAL at 08:53

## 2024-01-10 RX ADMIN — FENTANYL CITRATE 25 MCG: 50 INJECTION INTRAMUSCULAR; INTRAVENOUS at 23:05

## 2024-01-10 RX ADMIN — ASPIRIN 81 MG: 81 TABLET, COATED ORAL at 08:53

## 2024-01-10 RX ADMIN — ENOXAPARIN SODIUM 80 MG: 80 INJECTION SUBCUTANEOUS at 08:52

## 2024-01-10 RX ADMIN — TOPIRAMATE 150 MG: 100 TABLET, FILM COATED ORAL at 08:54

## 2024-01-10 RX ADMIN — SENNOSIDES AND DOCUSATE SODIUM 2 TABLET: 50; 8.6 TABLET ORAL at 17:55

## 2024-01-10 RX ADMIN — CEFTRIAXONE 2000 MG: 2 INJECTION, SOLUTION INTRAVENOUS at 17:55

## 2024-01-10 RX ADMIN — FENTANYL CITRATE 25 MCG: 50 INJECTION INTRAMUSCULAR; INTRAVENOUS at 23:30

## 2024-01-10 RX ADMIN — FENTANYL CITRATE 25 MCG: 50 INJECTION INTRAMUSCULAR; INTRAVENOUS at 20:35

## 2024-01-10 RX ADMIN — DEXMEDETOMIDINE HYDROCHLORIDE 1.5 MCG/KG/HR: 4 INJECTION, SOLUTION INTRAVENOUS at 20:35

## 2024-01-10 RX ADMIN — SODIUM CHLORIDE, SODIUM GLUCONATE, SODIUM ACETATE, POTASSIUM CHLORIDE, MAGNESIUM CHLORIDE, SODIUM PHOSPHATE, DIBASIC, AND POTASSIUM PHOSPHATE 100 ML/HR: .53; .5; .37; .037; .03; .012; .00082 INJECTION, SOLUTION INTRAVENOUS at 21:41

## 2024-01-10 RX ADMIN — FENTANYL CITRATE 25 MCG: 50 INJECTION INTRAMUSCULAR; INTRAVENOUS at 22:45

## 2024-01-10 RX ADMIN — CHLORHEXIDINE GLUCONATE 15 ML: 1.2 SOLUTION ORAL at 22:00

## 2024-01-10 RX ADMIN — GADOBUTROL 7 ML: 604.72 INJECTION INTRAVENOUS at 23:19

## 2024-01-10 RX ADMIN — DEXMEDETOMIDINE HYDROCHLORIDE 0.2 MCG/KG/HR: 400 INJECTION INTRAVENOUS at 02:18

## 2024-01-10 RX ADMIN — CEFTRIAXONE 1000 MG: 1 INJECTION, SOLUTION INTRAVENOUS at 04:51

## 2024-01-10 RX ADMIN — DEXTROSE 50 ML/HR: 5 SOLUTION INTRAVENOUS at 12:03

## 2024-01-10 NOTE — ASSESSMENT & PLAN NOTE
"Per report, patient was having increased confusion, worsening ambulation, trouble word finding for a week. Patient brought in as a stroke alert  MRI brain read as \"Tiny focus of abnormal diffusion signal within the right basal ganglia involving the anterior limb of the internal capsule not clearly restricted on the ADC maps but suspicious for small acute lacunar infarct.\"  Neurology feels small vessel appearing CVA is less likely due to this not explaining patient's presentation and it not contributing to seizure risk      Plan:  Echo pending  Continue asa, statin  Neurology following  "

## 2024-01-10 NOTE — SPEECH THERAPY NOTE
Speech-Language Pathology Bedside Swallow Evaluation        Patient Name: Carla Hunt  Today's Date: 1/10/2024     Problem List  Principal Problem:    Localization-related symptomatic epilepsy and epileptic syndromes with complex partial seizures, intractable, without status epilepticus (HCC)  Active Problems:    Disorder of parathyroid gland (HCC)    Stroke-like symptoms    COVID    Stage 3b chronic kidney disease (CKD) (HCC)    Abnormal CT of the head    Sepsis (HCC)    Acute respiratory failure with hypoxia (HCC)    Transaminitis    Teto marginal zone B-cell lymphoma (HCC)    Encephalopathy acute       Past Medical History  Past Medical History:   Diagnosis Date    Hx of hypercalcemia     Lymphoma (HCC) 2021    Parathyroid disease (HCC)     Seizures (HCC)     Situational depression     24 yr old son  from drug overdose       Past Surgical History  Past Surgical History:   Procedure Laterality Date    CRANIOTOMY FOR TEMPORAL LOBECTOMY Left      Summary/Impressions:    Pt presents with s/s oropharyngeal dysphagia.  At present this impairment is confounded by poor JESSICA; with AMS as well. NG tube in place.  Therefore, PO trials held for patient safety.  Tactile thermal stimulation via iced spoon provided w/ tactile cues for labial engagement /seal to spoon and deep pressure to lingual surface.  Patient elicits occasional spontaneous swallow for secretion mgmt.  O2 saturation remains WNL.  No audible congestion or cough.  This is suggestive that pharyngeal swallow reflex remains intact.  However, once again further evaluation including PO trials unable to be completed at this time 2/2 pt state.  Maintain strict aspiration and reflux precautions.     Recommendations:   Diet: NPO with tube feeds *HOB elevation >30'   Meds: non-oral if possible   Feeding assistance: NA  Frequent Oral care: 2-4x/day  Aspiration precautions and compensatory swallowing strategies: upright posture  Other Recommendations/  considerations: SLP will continue to follow to determine safety of PO trials/oral diet and need for instrumental measures x2-4    Current Medical Status  Pt is a 58 y.o. female who presented to [facility] with hx of lymphoma, Seizure s/p Left parietal lobectomy in 2007, continued epilepsy, hypothyroidism, Lymphoma who presented on 1/7/2024 w/ AMS vs stroke.  Dysphagia evaluation deferred multiple times prior to this encounter 2/2 pt mental status.  Last evening RRT called 2/2 AMS and patient now managed by critical care.      Past medical history:  Please see H&P for details    Special Studies:  1/9/24 MRI brain:  Tiny focus of abnormal diffusion signal within the right basal ganglia involving the anterior limb of the internal capsule not clearly restricted on the ADC maps but suspicious for small acute lacunar infarct.     1/7/24 Chest XR:  Multifocal pneumonia and/or edema.     Social/Education/Vocational Hx:  Pt lives with family    Swallow Information   Current Risks for Dysphagia & Aspiration: CVA and AMS  Current Symptoms/Concerns:  AMS  Current Diet: NPO with tube feeds   Baseline Diet: regular diet and thin liquids    Baseline Assessment   Behavior/Cognition: low alertness level  Speech/Language Status: not able to to follow commands and no verbal output noted  Patient Positioning: upright in bed    Swallow Mechanism Exam   Facial: symmetrical  Labial: unable to test 2/2 limited command following  Lingual: unable to test 2/2 limited command following  Velum: unable to visualize  Mandible: unable to test 2/2 limited command following  Dentition: adequate  Vocal quality: NA    Volitional Cough: unable to initiate volitional cough   Respiratory: MFNC 13L    Consistencies Assessed and Performance   Consistencies Administered:  thermal stimulation via iced spoon     Oral Stage: Poor JESSICA prevents efficient oral response from patient.  Tactile cues provided for labial seal and mandibular support to prevent  tonic bite.     Pharyngeal Stage: Laryngeal rise noted upon palpation x1-2.  Swallows reflexive and spontaneous in nature.  No volitional swallows observed.     Esophageal Concerns: none reported      Results Reviewed with: RN, MD, and family     Plan  Will continue to follow for x3-5  Dysphagia Goals: pt will participate in repeat swallow eval to determine safety of po intake and/or further instrumental testing.    Discharge recommendation:  TBD    Speech Therapy Prognosis   Prognosis: deferred  Prognosis Considerations: medical status        Kaylah Mejias MS, CCC-SLP  Speech-Language Pathologist  [place] #[id number]  [place] #[id number]

## 2024-01-10 NOTE — PHYSICAL THERAPY NOTE
Physical Therapy Cancellation Note      PT order received. Chart review performed. At this time, PT evaluation cancelled per patients RN pt not medically appropriate for evaluation at this time. PT will continue to follow and evaluate as patient is medically appropriate for skilled PT interventions.       01/10/24 0944   PT Last Visit   PT Visit Date 01/10/24   Note Type   Note type Cancelled Session;Evaluation   Cancel Reasons Medical status         Jazmin Sal, PT

## 2024-01-10 NOTE — PROGRESS NOTES
Carla Hunt is a 58 y.o. female who is currently ordered Vancomycin IV with management by the Pharmacy Consult service.  Relevant clinical data and objective / subjective history reviewed.  Vancomycin Assessment:  Indication and Goal AUC/Trough: Other, sepsis, unknown source, -600, trough >10  Clinical Status: new  Micro:   pend  Renal Function:  SCr: 1.25 mg/dL  CrCl: 55 mL/min  Renal replacement: Not on dialysis  Days of Therapy: 1  Current Dose:  1250mg q24h  Vancomycin Plan:  New Dosing:  same  Estimated AUC: 438 mcghr/mL  Estimated Trough: 13 mcg/mL  Next Level: 1/11 0600  Renal Function Monitoring: Daily BMP and UOP  Pharmacy will continue to follow closely for s/sx of nephrotoxicity, infusion reactions and appropriateness of therapy.  BMP and CBC will be ordered per protocol. We will continue to follow the patient’s culture results and clinical progress daily.    Rafael Reynaga, Pharmacist

## 2024-01-10 NOTE — ASSESSMENT & PLAN NOTE
Covid positive on admission  Now requiring MFNC  CRP 88.6, D-dimer 1.98    Plan:  Escalate to moderate covid pathway  Remdesivir  Decadron 6 mg IV daily x 10 days  Actemra x 1 dose  Therapeutic Lovenox  Trend CRP Q1-3 days

## 2024-01-10 NOTE — PLAN OF CARE
Recommendations:   Diet: NPO with tube feeds *HOB elevation >30'   Meds: non-oral if possible   Feeding assistance: NA  Frequent Oral care: 2-4x/day  Aspiration precautions and compensatory swallowing strategies: upright posture  Other Recommendations/ considerations: SLP will continue to follow to determine safety of PO trials/oral diet and need for instrumental measures x2-4

## 2024-01-10 NOTE — QUICK NOTE
Case discussed with pt's outpatient epileptology attending. After discussions with outpatient epileptology attending and neurology attending, recommend proceeding with LP. Ideally, MRI brain w contrast would be completed prior to LP; however, would not delay LP at this time. Discussed recommendation with critical care AP. Updated by critical care AP that critical care AP has contacted [facility] twice regarding transfer of patient. No clear time given for patient transfer to [facility] at this point. Attending neurologist made aware by this AP.

## 2024-01-10 NOTE — PROGRESS NOTES
Carla Hunt is a 58 y.o. female who is currently ordered Vancomycin IV with management by the Pharmacy Consult service.  Relevant clinical data and objective / subjective history reviewed.  Vancomycin Assessment:  Indication and Goal AUC/Trough: Other, sepsis, unknown source, -600, trough >10  Clinical Status:  critically ill   Micro:     Renal Function:  SCr: 1.2 mg/dL  CrCl: 56.2 mL/min  Renal replacement: Not on dialysis  Days of Therapy: 2  Current Dose: 1250mg IV q24h  Vancomycin Plan:  New Dosing: continue 1250mg IV q24h  Estimated AUC: 447 mcg*hr/mL  Estimated Trough: 13 mcg/mL  Next Level: 1/11/24 AM level   Renal Function Monitoring: Daily BMP and UOP  Pharmacy will continue to follow closely for s/sx of nephrotoxicity, infusion reactions and appropriateness of therapy.  BMP and CBC will be ordered per protocol. We will continue to follow the patient’s culture results and clinical progress daily.    Lizeth Young, Pharmacist

## 2024-01-10 NOTE — ASSESSMENT & PLAN NOTE
Unclear etiology, initial concern for CVA which seems less likely vs breakthrough seizure activity vs toxic/metabolic vs encephalitis  Continue AED and add Keppra for ongoing concern for breakthrough seizures  See plan for sepsis as above, continue ceftriaxone and vancomycin, F/U cultures, can consider LP  Continue aspirin and statin  Monitor neuro exam  CAM ICU and delirium precautions  NGT placed for oral medication administration  Continue restraints and continual observation to maintain patient's and staff's safety

## 2024-01-10 NOTE — ASSESSMENT & PLAN NOTE
Patient has history of seizures, outpatient maintained on lamictal and topamax  S/p left temporal lobectomy for seizure d/o  Neurology consulted  Patient unable to take oral AED and missed one dose due to encephalopathy  Started on IV vimpat this morning given concern for breakthrough seizures  NGT now in place to facilitate oral medication administration    Plan:  Topiramate increased to 150 mg BID  Continue lamotrigine 200 mg BID  Stop IV Vimpat  If there is clinical concern for seizure, load with Keppra  Continue seizure precautions  Neurology following  Plan for eventual transfer to [facility] for vEEG

## 2024-01-10 NOTE — ASSESSMENT & PLAN NOTE
"1/7 CTA HN on admission - \"Tiny punctate foci of relative increased parenchymal density in the frontal lobes bilaterally, unchanged from 2 hours earlier and probably representing low-density parenchymal calcifications rather than parenchymal hemorrhage. Conservative management with an additional follow-up noncontrast head CT is recommended in 24 to 48 hours.\"  1// MRI brain- \"Tiny focus of abnormal diffusion signal within the right basal ganglia involving the anterior limb of the internal capsule not clearly restricted on the ADC maps but suspicious for small acute lacunar infarct. \"    Plan:  Will discuss with neurology regarding repeat imaging  "

## 2024-01-10 NOTE — UTILIZATION REVIEW
Continued Stay Review    Date: 1/10                          Current Patient Class: Inpatient   Current Level of Care: CRITICAL CARE step down    HPI:58 y.o. female initially admitted on 1/8     Assessment/Plan:   NGtube in place for PO medications.  Continue with IV abx.  Consider Lp.  Repeat blood cultures pending.  COVID + now requiring MFNC.  COntinue remdesivir, continue decadron, give dose of actemra.  Trend CRP. ECHO pending.     Neurology consult:  Unclear how much of her current encephalopathy may be due to subclinical seizures and/or a prolonged post-ictal state given her hx of focal L temporal epilepsy. Possible that threshold had also been lowered by acute illness with COVID. Given her continued/persistent encephalopathy, we are recommending MRI brain w/ contrast and LP at this time.  Continue with topiramate, lamotrigine. Unable to secure a bed for video EEG monitoring at another campus.   Vital Signs:   Date/Time Temp Pulse Resp BP MAP (mmHg) SpO2 Calculated FIO2 (%) - Nasal Cannula Nasal Cannula O2 Flow Rate (L/min) O2 Device O2 Interface Device Patient Position - Orthostatic VS   01/10/24 1500 96.6 °F (35.9 °C) Abnormal  94 36 Abnormal  142/80 105 94 % -- -- -- -- Lying   01/10/24 1418 -- -- -- -- -- 94 % 80 15 L/min Mid flow nasal cannula MFNC prongs --   01/10/24 1200 98 °F (36.7 °C) 60 22 152/77 110 91 % 72 13 L/min Mid flow nasal cannula -- Lying   01/10/24 0911 -- -- -- -- -- -- 72 13 L/min Mid flow nasal cannula -- --   01/10/24 0742 -- -- -- -- -- 94 % 80 15 L/min Mid flow nasal cannula MFNC prongs --       Pertinent Labs/Diagnostic Results:     Results from last 7 days   Lab Units 01/07/24 1916   SARS-COV-2  Positive*     Results from last 7 days   Lab Units 01/10/24  0441 01/09/24  1926 01/09/24  0343 01/08/24  0410 01/07/24 1916   WBC Thousand/uL 1.99* 2.26* 3.01* 2.37* 3.44*   HEMOGLOBIN g/dL 11.9 11.4* 12.5 11.9 13.5   HEMATOCRIT % 36.0 35.0 38.1 35.1 40.7   PLATELETS Thousands/uL 293  264 291 251 308   NEUTROS ABS Thousands/µL 1.71* 1.98  --   --  2.99         Results from last 7 days   Lab Units 01/10/24  1219 01/10/24  1030 01/10/24  0441 01/09/24  1926 01/09/24  0342   SODIUM mmol/L 148* 150* 148* 146 143   POTASSIUM mmol/L 4.3 4.3 4.0 3.6 4.1   CHLORIDE mmol/L 122* 121* 121* 120* 117*   CO2 mmol/L 18* 23 18* 18* 19*   ANION GAP mmol/L 8 6 9 8 7   BUN mg/dL 24 24 21 20 18   CREATININE mg/dL 1.20 1.33* 1.20 1.31* 1.29   EGFR ml/min/1.73sq m 50 44 50 45 46   CALCIUM mg/dL 9.3 9.4 8.8 8.8 9.1   MAGNESIUM mg/dL  --   --  2.2  --  2.0   PHOSPHORUS mg/dL  --   --  2.8  --   --      Results from last 7 days   Lab Units 01/10/24  1030 01/10/24  0441 01/09/24  1926 01/07/24  1916   AST U/L  --  125* 135* 141*   ALT U/L  --  87* 89* 92*   ALK PHOS U/L  --  97 99 123*   TOTAL PROTEIN g/dL  --  5.8* 5.7* 6.5   ALBUMIN g/dL  --  3.3* 3.3* 3.7   TOTAL BILIRUBIN mg/dL  --  0.59 0.70 0.60   BILIRUBIN DIRECT mg/dL  --   --   --  0.23*   AMMONIA umol/L 46  --   --   --      Results from last 7 days   Lab Units 01/10/24  1126 01/07/24  1904   POC GLUCOSE mg/dl 117 138     Results from last 7 days   Lab Units 01/10/24  1219 01/10/24  1030 01/10/24  0441 01/09/24  1926 01/09/24  0342 01/08/24  0410 01/07/24  1916   GLUCOSE RANDOM mg/dL 136 116 138 107 102 109 131         Results from last 7 days   Lab Units 01/09/24 0343   HEMOGLOBIN A1C % 6.6*   EAG mg/dl 143           Results from last 7 days   Lab Units 01/09/24 1926   CK TOTAL U/L 314*     Results from last 7 days   Lab Units 01/07/24 1916   HS TNI 0HR ng/L 12     Results from last 7 days   Lab Units 01/09/24 1926   D-DIMER QUANTITATIVE ug/ml FEU 1.98*     Results from last 7 days   Lab Units 01/07/24 1916   PROTIME seconds 14.6*   INR  1.07   PTT seconds 38*       Results from last 7 days   Lab Units 01/10/24  0441 01/09/24 1926 01/09/24  0335   PROCALCITONIN ng/ml 0.60* 0.66* 0.63*     Results from last 7 days   Lab Units 01/09/24  1926 01/09/24  0335  01/07/24 1916   LACTIC ACID mmol/L 0.9 1.0 1.8     Results from last 7 days   Lab Units 01/09/24 2036   HEP B S AG  Non-reactive   HEP C AB  Non-reactive   HEP B C IGM  Non-reactive   HEP B C TOTAL AB  Non-reactive     Results from last 7 days   Lab Units 01/10/24  0441 01/09/24  1926   CRP mg/L 110.4* 88.6*       Results from last 7 days   Lab Units 01/07/24 2256   CLARITY UA  Clear   COLOR UA  Light Yellow   SPEC GRAV UA  1.038*   PH UA  6.5   GLUCOSE UA mg/dl Negative   KETONES UA mg/dl Negative   BLOOD UA  Small*   PROTEIN UA mg/dl Trace*   NITRITE UA  Negative   BILIRUBIN UA  Negative   UROBILINOGEN UA (BE) mg/dl <2.0   LEUKOCYTES UA  Small*   WBC UA /hpf 4-10*   RBC UA /hpf 2-4*   BACTERIA UA /hpf None Seen   EPITHELIAL CELLS WET PREP /hpf Occasional     Results from last 7 days   Lab Units 01/07/24 1916   INFLUENZA A PCR  Negative   INFLUENZA B PCR  Negative   RSV PCR  Negative       Results from last 7 days   Lab Units 01/09/24 2039 01/09/24 2035 01/07/24 2256 01/07/24 1916 01/07/24 1910   BLOOD CULTURE  Received in Microbiology Lab. Culture in Progress. Received in Microbiology Lab. Culture in Progress.  --  No Growth at 48 hrs. No Growth at 48 hrs.   URINE CULTURE   --   --  60,000-69,000 cfu/ml Lactobacillus species*  --   --      Medications:   Scheduled Medications:  aspirin, 81 mg, Oral, Daily  atorvastatin, 20 mg, Oral, Daily With Dinner  cefTRIAXone, 1,000 mg, Intravenous, Q24H  chlorhexidine, 15 mL, Mouth/Throat, Q12H SARTHAK  dexamethasone, 6 mg, Intravenous, Daily  enoxaparin, 1 mg/kg, Subcutaneous, Once  lamoTRIgine, 200 mg, Oral, BID  polyethylene glycol, 17 g, Oral, Daily  remdesivir, 100 mg, Intravenous, Q24H  senna-docusate sodium, 2 tablet, Oral, BID  topiramate, 150 mg, Oral, BID  vancomycin, 15 mg/kg, Intravenous, Q24H  venlafaxine, 75 mg, Oral, Daily      Continuous IV Infusions:  dexmedetomidine, 0.1-0.7 mcg/kg/hr, Intravenous, Titrated  dextrose, 50 mL/hr, Intravenous,  Continuous      PRN Meds:  acetaminophen, 650 mg, Oral, Q6H PRN  bisacodyl, 10 mg, Rectal, Daily PRN  ondansetron, 4 mg, Intravenous, Q6H PRN        Discharge Plan: TBD    Network Utilization Review Department  ATTENTION: Please call with any questions or concerns to [phone] and carefully listen to the prompts so that you are directed to the right person. All voicemails are confidential.   For Discharge needs, contact Care Management DC Support Team at [phone] opt. [phone]  Send all requests for admission clinical reviews, approved or denied determinations and any other requests to dedicated fax number below belonging to the campus where the patient is receiving treatment. List of dedicated fax numbers for the Facilities:  FACILITY NAME UR FAX NUMBER   ADMISSION DENIALS (Administrative/Medical Necessity) [phone]   DISCHARGE SUPPORT TEAM (NETWORK) [phone]   PARENT CHILD HEALTH (Maternity/NICU/Pediatrics) [phone]   [facility] [phone]   [facility] [phone]   [facility] [phone]   [facility] [phone]   [facility] [phone]   [facility] [phone]   [facility] [phone]   [facility] [phone]   [facility] [phone]   [facility] [phone]   [facility] [phone]

## 2024-01-10 NOTE — ASSESSMENT & PLAN NOTE
Diagnosed in 2021 with metastases to the bone  S/p benadmustine/rituxan x 6 cycles completed 1/3/2022   On maintenance rituxan hycela x 2 years starting 5/2022  Follows with [facility]

## 2024-01-10 NOTE — PROGRESS NOTES
Carla Hunt is a 58 y.o. female who is currently ordered Vancomycin IV with management by the Pharmacy Consult service.  Relevant clinical data and objective / subjective history reviewed.  Vancomycin Assessment:  Indication and Goal AUC/Trough: CNS infection (goal -600, trough 15-20), sepsis, unknown source, -600, trough >10  Clinical Status: worsening  Micro:     Renal Function:  SCr: 1.3 mg/dL  CrCl: 43.1 mL/min  Renal replacement: Not on dialysis  Days of Therapy: 2  Current Dose: 1250mg IV Q24H  Vancomycin Plan:  New Dosinmg IV Q24H  Estimated AUC: 555 mcg*hr/mL  Estimated Trough: 16.5 mcg/mL  Next Level: 24 with AM labs  Renal Function Monitoring: Daily BMP and UOP  Pharmacy will continue to follow closely for s/sx of nephrotoxicity, infusion reactions and appropriateness of therapy.  BMP and CBC will be ordered per protocol. We will continue to follow the patient’s culture results and clinical progress daily.    Mine Rodriguez, Pharmacist

## 2024-01-10 NOTE — ASSESSMENT & PLAN NOTE
LFTs elevated on arrival: , ALT 92, Alk phos 123 with normal Tbili  Improving   Abdominal exam benign  Repeat CMP in the morning

## 2024-01-10 NOTE — PLAN OF CARE
Problem: Prexisting or High Potential for Compromised Skin Integrity  Goal: Skin integrity is maintained or improved  Description: INTERVENTIONS:  - Identify patients at risk for skin breakdown  - Assess and monitor skin integrity  - Assess and monitor nutrition and hydration status  - Monitor labs   - Assess for incontinence   - Turn and reposition patient  - Assist with mobility/ambulation  - Relieve pressure over bony prominences  - Avoid friction and shearing  - Provide appropriate hygiene as needed including keeping skin clean and dry  - Evaluate need for skin moisturizer/barrier cream  - Collaborate with interdisciplinary team   - Patient/family teaching  - Consider wound care consult   Outcome: Progressing     Problem: PAIN - ADULT  Goal: Verbalizes/displays adequate comfort level or baseline comfort level  Description: Interventions:  - Encourage patient to monitor pain and request assistance  - Assess pain using appropriate pain scale  - Administer analgesics based on type and severity of pain and evaluate response  - Implement non-pharmacological measures as appropriate and evaluate response  - Consider cultural and social influences on pain and pain management  - Notify physician/advanced practitioner if interventions unsuccessful or patient reports new pain  Outcome: Progressing     Problem: INFECTION - ADULT  Goal: Absence or prevention of progression during hospitalization  Description: INTERVENTIONS:  - Assess and monitor for signs and symptoms of infection  - Monitor lab/diagnostic results  - Monitor all insertion sites, i.e. indwelling lines, tubes, and drains  - Monitor endotracheal if appropriate and nasal secretions for changes in amount and color  - Marshall appropriate cooling/warming therapies per order  - Administer medications as ordered  - Instruct and encourage patient and family to use good hand hygiene technique  - Identify and instruct in appropriate isolation precautions for  identified infection/condition  Outcome: Progressing  Goal: Absence of fever/infection during neutropenic period  Description: INTERVENTIONS:  - Monitor WBC    Outcome: Progressing     Problem: SAFETY ADULT  Goal: Patient will remain free of falls  Description: INTERVENTIONS:  - Educate patient/family on patient safety including physical limitations  - Instruct patient to call for assistance with activity   - Consult OT/PT to assist with strengthening/mobility   - Keep Call bell within reach  - Keep bed low and locked with side rails adjusted as appropriate  - Keep care items and personal belongings within reach  - Initiate and maintain comfort rounds  - Make Fall Risk Sign visible to staff  - Offer Toileting every 2 Hours, in advance of need  - Initiate/Maintain bed alarm  - Obtain necessary fall risk management equipment:    - Apply yellow socks and bracelet for high fall risk patients  - Consider moving patient to room near nurses station  Outcome: Progressing  Goal: Maintain or return to baseline ADL function  Description: INTERVENTIONS:  -  Assess patient's ability to carry out ADLs; assess patient's baseline for ADL function and identify physical deficits which impact ability to perform ADLs (bathing, care of mouth/teeth, toileting, grooming, dressing, etc.)  - Assess/evaluate cause of self-care deficits   - Assess range of motion  - Assess patient's mobility; develop plan if impaired  - Assess patient's need for assistive devices and provide as appropriate  - Encourage maximum independence but intervene and supervise when necessary  - Involve family in performance of ADLs  - Assess for home care needs following discharge   - Consider OT consult to assist with ADL evaluation and planning for discharge  - Provide patient education as appropriate  Outcome: Progressing  Goal: Maintains/Returns to pre admission functional level  Description: INTERVENTIONS:  - Perform AM-PAC 6 Click Basic Mobility/ Daily Activity  assessment daily.  - Set and communicate daily mobility goal to care team and patient/family/caregiver.   - Collaborate with rehabilitation services on mobility goals if consulted  - Perform Range of Motion 3 times a day.  - Reposition patient every 2 hours.  - Dangle patient 3 times a day  - Stand patient 3 times a day  - Ambulate patient 3 times a day  - Out of bed to chair 3 times a day   - Out of bed for meals 3 times a day  - Out of bed for toileting  - Record patient progress and toleration of activity level   Outcome: Progressing     Problem: DISCHARGE PLANNING  Goal: Discharge to home or other facility with appropriate resources  Description: INTERVENTIONS:  - Identify barriers to discharge w/patient and caregiver  - Arrange for needed discharge resources and transportation as appropriate  - Identify discharge learning needs (meds, wound care, etc.)  - Arrange for interpretive services to assist at discharge as needed  - Refer to Case Management Department for coordinating discharge planning if the patient needs post-hospital services based on physician/advanced practitioner order or complex needs related to functional status, cognitive ability, or social support system  Outcome: Progressing     Problem: Knowledge Deficit  Goal: Patient/family/caregiver demonstrates understanding of disease process, treatment plan, medications, and discharge instructions  Description: Complete learning assessment and assess knowledge base.  Interventions:  - Provide teaching at level of understanding  - Provide teaching via preferred learning methods  Outcome: Progressing     Problem: Neurological Deficit  Goal: Neurological status is stable or improving  Description: Interventions:  - Monitor and assess patient's level of consciousness, motor function, sensory function, and level of assistance needed for ADLs.   - Monitor and report changes from baseline. Collaborate with interdisciplinary team to initiate plan and  implement interventions as ordered.   - Provide and maintain a safe environment.  - Consider seizure precautions.  - Consider fall precautions.  - Consider aspiration precautions.  - Consider bleeding precautions.  Outcome: Progressing     Problem: Activity Intolerance/Impaired Mobility  Goal: Mobility/activity is maintained at optimum level for patient  Description: Interventions:  - Assess and monitor patient  barriers to mobility and need for assistive/adaptive devices.  - Assess patient's emotional response to limitations.  - Collaborate with interdisciplinary team and initiate plans and interventions as ordered.  - Encourage independent activity per ability.  - Maintain proper body alignment.  - Perform active/passive rom as tolerated/ordered.  - Plan activities to conserve energy.  - Turn patient as appropriate  Outcome: Progressing     Problem: Communication Impairment  Goal: Ability to express needs and understand communication  Description: Assess patient's communication skills and ability to understand information.  Patient will demonstrate use of effective communication techniques, alternative methods of communication and understanding even if not able to speak.     - Encourage communication and provide alternate methods of communication as needed.  - Collaborate with case management/ for discharge needs.  - Include patient/family/caregiver in decisions related to communication.  Outcome: Progressing     Problem: Potential for Aspiration  Goal: Non-ventilated patient's risk of aspiration is minimized  Description: Assess and monitor vital signs, respiratory status, and labs (WBC).  Monitor for signs of aspiration (tachypnea, cough, rales, wheezing, cyanosis, fever).    - Assess and monitor patient's ability to swallow.  - Place patient up in chair to eat if possible.  - HOB up at 90 degrees to eat if unable to get patient up into chair.  - Supervise patient during oral intake.   - Instruct  patient/ family to take small bites.  - Instruct patient/ family to take small single sips when taking liquids.  - Follow patient-specific strategies generated by speech pathologist.  Outcome: Progressing  Goal: Ventilated patient's risk of aspiration is minimized  Description: Assess and monitor vital signs, respiratory status, airway cuff pressure, and labs (WBC).  Monitor for signs of aspiration (tachypnea, cough, rales, wheezing, cyanosis, fever).    - Elevate head of bed 30 degrees if patient has tube feeding.  - Monitor tube feeding.  Outcome: Progressing     Problem: Nutrition  Goal: Nutrition/Hydration status is improving  Description: Monitor and assess patient's nutrition/hydration status for malnutrition (ex- brittle hair, bruises, dry skin, pale skin and conjunctiva, muscle wasting, smooth red tongue, and disorientation). Collaborate with interdisciplinary team and initiate plan and interventions as ordered.  Monitor patient's weight and dietary intake as ordered or per policy. Utilize nutrition screening tool and intervene per policy. Determine patient's food preferences and provide high-protein, high-caloric foods as appropriate.     - Assist patient with eating.  - Allow adequate time for meals.  - Encourage patient to take dietary supplement as ordered.  - Collaborate with clinical nutritionist.  - Include patient/family/caregiver in decisions related to nutrition.  Outcome: Progressing     Problem: SAFETY,RESTRAINT: NV/NON-SELF DESTRUCTIVE BEHAVIOR  Goal: Remains free of harm/injury (restraint for non violent/non self-detsructive behavior)  Description: INTERVENTIONS:  - Instruct patient/family regarding restraint use   - Assess and monitor physiologic and psychological status   - Provide interventions and comfort measures to meet assessed patient needs   - Identify and implement measures to help patient regain control  - Assess readiness for release of restraint   Outcome: Progressing  Goal:  Returns to optimal restraint-free functioning  Description: INTERVENTIONS:  - Assess the patient's behavior and symptoms that indicate continued need for restraint  - Identify and implement measures to help patient regain control  - Assess readiness for release of restraint   Outcome: Progressing

## 2024-01-10 NOTE — ASSESSMENT & PLAN NOTE
Recent history of UTI treated as an outpatient with Keflex followed by cipro, however urine culture with mixed contaminants and repeat UA unremarkable  Patient is covid positive  Of note she is on immunotherapy for history of lymphoma  AEB tachycardia, febrile, leukopenia   Blood cultures negative from 1/7  Procal 0.63, 0.66  Lactate 0.9      Plan:  Continue infectious work up  Repeat blood cx pending, follow up results  Continue ceftriaxone  Start vancomycin  Check strep, legionella, MRSA  Trend procal, WBC/temperature curve  Consider LP

## 2024-01-10 NOTE — PROGRESS NOTES
[facility]  Progress Note  Name: Carla Hunt I  MRN: [id number]  Unit/Bed#:  I Date of Admission: 1/7/2024   Date of Service: 1/10/2024 I Hospital Day: 2    Assessment/Plan   Encephalopathy acute  Assessment & Plan  Unclear etiology, initial concern for CVA which seems less likely vs breakthrough seizure activity vs toxic/metabolic vs encephalitis  Continue AED and add Keppra for ongoing concern for breakthrough seizures  See plan for sepsis as above, continue ceftriaxone and vancomycin, F/U cultures, can consider LP  Continue aspirin and statin  Monitor neuro exam  CAM ICU and delirium precautions  NGT placed for oral medication administration  Continue restraints and continual observation to maintain patient's and staff's safety     Teto marginal zone B-cell lymphoma (HCC)  Assessment & Plan  Diagnosed in 2021 with metastases to the bone  S/p benadmustine/rituxan x 6 cycles completed 1/3/2022   On maintenance rituxan hycela x 2 years starting 5/2022  Follows with [facility]    Transaminitis  Assessment & Plan  LFTs elevated on arrival: , ALT 92, Alk phos 123 with normal Tbili  Improving   Abdominal exam benign  Repeat CMP in the morning    Acute respiratory failure with hypoxia (HCC)  Assessment & Plan  COVID positive on admission (1/7/24)  Initially admitted on RA, O2 requirements slowly increasing  Now on 15L MFNC    On CTA H/N, imaged lungs note - groundglass and consolidative airspace opacities most suspicious for extensive pneumonia seen throughout the visualized mid and upper lung zones more dense on the right than on the left    Plan:  See plan for COVID as above  Titrate oxygen to maintain SpO2 >90%     Sepsis (HCC)  Assessment & Plan  Recent history of UTI treated as an outpatient with Keflex followed by cipro, however urine culture with mixed contaminants and repeat UA unremarkable  Patient is covid positive  Of note she is on immunotherapy for  "history of lymphoma  AEB tachycardia, febrile, leukopenia   Blood cultures negative from 1/7  Procal 0.63, 0.66  Lactate 0.9      Plan:  Continue infectious work up  Repeat blood cx pending, follow up results  Continue ceftriaxone  Start vancomycin  Check strep, legionella, MRSA  Trend procal, WBC/temperature curve  Consider LP    Abnormal CT of the head  Assessment & Plan  1/7 CTA HN on admission - \"Tiny punctate foci of relative increased parenchymal density in the frontal lobes bilaterally, unchanged from 2 hours earlier and probably representing low-density parenchymal calcifications rather than parenchymal hemorrhage. Conservative management with an additional follow-up noncontrast head CT is recommended in 24 to 48 hours.\"  1// MRI brain- \"Tiny focus of abnormal diffusion signal within the right basal ganglia involving the anterior limb of the internal capsule not clearly restricted on the ADC maps but suspicious for small acute lacunar infarct. \"    Plan:  Will discuss with neurology regarding repeat imaging    Stage 3b chronic kidney disease (CKD) ([facility])  Assessment & Plan  Lab Results   Component Value Date    EGFR 45 01/09/2024    EGFR 46 01/09/2024    EGFR 42 01/08/2024    CREATININE 1.31 (H) 01/09/2024    CREATININE 1.29 01/09/2024    CREATININE 1.38 (H) 01/08/2024     Cr on admission 1.50 with baseline 1.1-1.3    Plan:  Trend renal indices  Monitor I&O's  Avoid nephrotoxins    COVID  Assessment & Plan  Covid positive on admission  Now requiring MFNC  CRP 88.6, D-dimer 1.98    Plan:  Escalate to moderate covid pathway  Remdesivir  Decadron 6 mg IV daily x 10 days  Actemra x 1 dose  Therapeutic Lovenox  Trend CRP Q1-3 days    Stroke-like symptoms  Assessment & Plan  Per report, patient was having increased confusion, worsening ambulation, trouble word finding for a week. Patient brought in as a stroke alert  MRI brain read as \"Tiny focus of abnormal diffusion signal within the right basal ganglia involving " "the anterior limb of the internal capsule not clearly restricted on the ADC maps but suspicious for small acute lacunar infarct.\"  Neurology feels small vessel appearing CVA is less likely due to this not explaining patient's presentation and it not contributing to seizure risk      Plan:  Echo pending  Continue asa, statin  Neurology following    Disorder of parathyroid gland (HCC)  Assessment & Plan  S/p parathyroidectomy in 2015    * Localization-related symptomatic epilepsy and epileptic syndromes with complex partial seizures, intractable, without status epilepticus (HCC)  Assessment & Plan  Patient has history of seizures, outpatient maintained on lamictal and topamax  S/p left temporal lobectomy for seizure d/o  Neurology consulted  Patient unable to take oral AED and missed one dose due to encephalopathy  Started on IV vimpat this morning given concern for breakthrough seizures  NGT now in place to facilitate oral medication administration    Plan:  Topiramate increased to 150 mg BID  Continue lamotrigine 200 mg BID  Stop IV Vimpat  If there is clinical concern for seizure, load with Keppra  Continue seizure precautions  Neurology following  Plan for eventual transfer to [facility] for vEEG             Disposition: Stepdown Level 2    ICU Core Measures     A: Assess, Prevent, and Manage Pain Has pain been assessed? Yes  Need for changes to pain regimen? No   B: Both SAT/SAT  N/A   C: Choice of Sedation RASS Goal: 0 Alert and Calm  Need for changes to sedation or analgesia regimen? No   D: Delirium CAM-ICU: Unable to perform secondary to Acute cognitive dysfunction   E: Early Mobility  Plan for early mobility? Yes   F: Family Engagement Plan for family engagement today? Yes       Antibiotic Review: Awaiting culture results.       Prophylaxis:  VTE VTE covered by:  enoxaparin, Subcutaneous, 80 mg at 01/09/24 2051       Stress Ulcer  not ordered         Significant 24hr Events     24hr events: Patient transferred to " the SDU pending bed availability at [facility] for continuous vEEG due to concern for seizures. No evidence of seizures overnight. Patient remains encephalopathic but is able to follow simple commands.      Subjective   Review of Systems   Unable to perform ROS: Mental status change   Psychiatric/Behavioral:  Positive for agitation.       Objective                            Vitals I/O      Most Recent Min/Max in 24hrs   Temp 98.7 °F (37.1 °C) Temp  Min: 98.7 °F (37.1 °C)  Max: 101.4 °F (38.6 °C)   Pulse 87 Pulse  Min: 87  Max: 134   Resp 20 Resp  Min: 18  Max: 24   /67 BP  Min: 95/69  Max: 136/88   O2 Sat 98 % SpO2  Min: 89 %  Max: 98 %      Intake/Output Summary (Last 24 hours) at 1/10/2024 0302  Last data filed at 1/9/2024 1400  Gross per 24 hour   Intake 1000 ml   Output --   Net 1000 ml       Diet NPO    Invasive Monitoring           Physical Exam   Physical Exam  Vitals reviewed.   Eyes:      Pupils: Pupils are equal, round, and reactive to light.   Skin:     General: Skin is warm and dry.   HENT:      Head: Normocephalic and atraumatic.      Nose: Nasogastric tube present.      Mouth/Throat:      Mouth: Mucous membranes are dry.   Cardiovascular:      Rate and Rhythm: Normal rate and regular rhythm.      Pulses: Normal pulses.      Heart sounds: Normal heart sounds.   Musculoskeletal:         General: Normal range of motion.      Right lower leg: No edema.      Left lower leg: No edema.   Abdominal: General: There is no distension.      Palpations: Abdomen is soft.      Tenderness: There is no abdominal tenderness. There is no guarding.   Constitutional:       Appearance: She is ill-appearing.   Pulmonary:      Effort: Pulmonary effort is normal.      Breath sounds: Rhonchi present.   Neurological:      General: No focal deficit present.      Mental Status: She is easily aroused. She is lethargic, agitated, disoriented to place, disoriented to time and disoriented to situation.      Comments: Follows simple  commands            Diagnostic Studies      EKG: Sinus rhythm in the 80s on telemetry  Imaging:  I have personally reviewed pertinent reports.       Medications:  Scheduled PRN   aspirin, 81 mg, Daily  atorvastatin, 20 mg, Daily With Dinner  cefTRIAXone, 1,000 mg, Q24H  chlorhexidine, 15 mL, Q12H SARTHAK  dexamethasone, 6 mg, Daily  enoxaparin, 1 mg/kg, Q12H SARTHAK  lamoTRIgine, 200 mg, BID  polyethylene glycol, 17 g, Daily  remdesivir, 100 mg, Q24H  senna-docusate sodium, 2 tablet, BID  topiramate, 150 mg, BID  vancomycin, 15 mg/kg, Q24H  venlafaxine, 75 mg, Daily      acetaminophen, 650 mg, Q6H PRN  ondansetron, 4 mg, Q6H PRN       Continuous    dexmedetomidine, 0.1-0.7 mcg/kg/hr, Last Rate: 0.2 mcg/kg/hr (01/10/24 0218)         Labs:    CBC    Recent Labs     01/09/24  0343 01/09/24 1926   WBC 3.01* 2.26*   HGB 12.5 11.4*   HCT 38.1 35.0    264     BMP    Recent Labs     01/09/24  0342 01/09/24 1926   SODIUM 143 146   K 4.1 3.6   * 120*   CO2 19* 18*   AGAP 7 8   BUN 18 20   CREATININE 1.29 1.31*   CALCIUM 9.1 8.8       Coags    No recent results     Additional Electrolytes  Recent Labs     01/09/24  0342   MG 2.0          Blood Gas    No recent results  No recent results LFTs  Recent Labs     01/09/24 1926   ALT 89*   *   ALKPHOS 99   ALB 3.3*   TBILI 0.70       Infectious  Recent Labs     01/09/24  0335 01/09/24 1926   PROCALCITONI 0.63* 0.66*     Glucose  Recent Labs     01/08/24  0410 01/09/24  0342 01/09/24  1926   GLUC 109 102 107               No critical care time.    DANYELLE Cat

## 2024-01-10 NOTE — PROGRESS NOTES
"Progress Note - Neurology   Carla Hunt 58 y.o. female [id number]  Unit/Bed#: /    Assessment/Plan:      * Localization-related symptomatic epilepsy and epileptic syndromes with complex partial seizures, intractable, without status epilepticus (HCC)  Assessment & Plan  58 y.o.  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 Pt found to have COVID infection in ED as well as hyperdense foci in both frontal regions suspicious for tiny foci of acute subarachnoid hemorrhage.     Neurodiagnostics:   - CTA H/N wwo contrast 1/7/24:   \"No acute arterial vascular abnormality in the head or neck. No flow-limiting large vessel arterial vascular stenosis in the head or neck. Tiny punctate foci of relative increased parenchymal density in the frontal lobes bilaterally, unchanged from 2 hours earlier and probably representing low-density parenchymal calcifications rather than parenchymal hemorrhage. Conservative management with an additional follow-up noncontrast head CT is recommended in 24 to 48 hours. Reidentified left temporal lobe resection. Dense basal ganglia and cerebellar calcifications again noted. Groundglass and consolidative airspace opacities most suspicious for extensive pneumonia seen throughout the visualized mid and upper lung zones more dense on the right than on the left.\"  - CTH wo contrast 1/7/24:   \"Multifocal pneumonia and/or edema.\"  - routine EEG 1/8/24:   \"This is an abnormal 30 minutes awake, drowsy, and asleep EEG due to disorganized theta-delta activity. Background activities in the delta/theta range are suggestive of a moderate diffuse cerebral dysfunction. The lack of epileptiform discharges on a routine EEG does not preclude the " "diagnosis of seizures or epilepsy. \"    Concern for focal seizures in setting of covid infection versus UTI versus worsening renal function.     Plan  - routine EEG completed, see above    - recommend pt be transferred to [facility] for vEEG monitoring urgently. Requested ICU team at [facility] discuss case with Neuro critical care team at [facility] in hopes of expediting transfer.   - MRI brain wo contrast completed, see below, would have preferred to complete imaging wwo contrast but given worsening renal function, will hold off on contrast at this point   - Now with renal function improving, recommend MRI brain w contrast  - Low threshold for LP if pt develops fevers again and/or pending MRI brain w contrast results   - If LP completed, CSF labs recommended: RBC, WBC, protein, glucose, ME panel, cryptococcal, flow cytometry, cytology  - Vimpat 400 mg IV x 1 load administered at 1022 AM 1/9/24 while pt was NPO, without NG tube placed  -NG tube placed 1/9/24  - Topiramate increased to 150 mg twice daily on 1/9/24.   - continue home Lamictal 100 mg BID  - If patient continues to have fluctuations in exam with the topiramate and lamotrigine,  then recommend discontinuing administering Keppra bolus and starting Keppra maintenance dosing  - lamotrigine and topiramate levels 1/8/24 pending   - Administer Ativan prn seizure-like activity    - telemetry  - Continue seizure precautions   - Discussed seizure precautions  - Frequent neuro checks.  Continue to monitor and notify Neurology with any changes.  - Medical management and supportive care per primary team.  Correction of any metabolic or infectious disturbances.            Stroke-like symptoms  Assessment & Plan    Neurodiagnostics:   - MRI brain wo contrast 1/9/24:   \"Tiny focus of abnormal diffusion signal within the right basal ganglia involving the anterior limb of the internal capsule not clearly restricted on the ADC maps but suspicious for small acute lacunar " "infarct.\"    Plan  - Stroke pathway  MRI brain wo contrast completed, see above   Recommend repeat MRI wo contrast in 4-6 weeks to confirm if the above diffusion signal was truly a stroke or shine through   Echo pending   Labs  Lipid Panel: Total cholesterol 118, LDL 59  Hemoglobin A1c 6.6    mg AK due to pt NPO as of 24 AM. Aspirin 81 mg daily starting 1/10/24   NG tube placed 24  Atorvastatin 20 mg daily   Goal normotension   Euglycemic, normothermic goal  Continue telemetry  PT/OT/ST  Stroke education  Frequent neuro checks. Continue to monitor and notify neurology with any changes.  STAT CT head for any acute change in neuro exam  - Medical management and supportive care per primary team. Correction of any metabolic or infectious disturbances.               Recommendations for outpatient neurological follow up have yet to be determined.    **History and physical examination obtained by attending neurologist. AP in room as witness to history and physical examination obtained and acted solely as a scribe for attending neurologist. This AP did not provide any decision making for this encounter.**        Subjective:   Pt's  reports no change in patient since yesterday. Pt without fever since yesterday afternoon per chart review.         Past Medical History:   Diagnosis Date    Hx of hypercalcemia     Lymphoma (HCC) 2021    Parathyroid disease (HCC)     Seizures (HCC)     Situational depression     24 yr old son  from drug overdose     Past Surgical History:   Procedure Laterality Date    CRANIOTOMY FOR TEMPORAL LOBECTOMY Left      Family History   Problem Relation Age of Onset    Diabetes Mother     Cancer Father      Social History     Socioeconomic History    Marital status: /Civil Union     Spouse name: Not on file    Number of children: Not on file    Years of education: Not on file    Highest education level: Not on file   Occupational History    Not on file   Tobacco " Use    Smoking status: Never    Smokeless tobacco: Never   Vaping Use    Vaping status: Never Used   Substance and Sexual Activity    Alcohol use: No    Drug use: Never    Sexual activity: Not on file   Other Topics Concern    Not on file   Social History Narrative    Not on file     Social Determinants of Health     Financial Resource Strain: Not on file   Food Insecurity: Not on file   Transportation Needs: Not on file   Physical Activity: Not on file   Stress: Not on file   Social Connections: Not on file   Intimate Partner Violence: Not on file   Housing Stability: Not on file     E-Cigarette/Vaping    E-Cigarette Use Never User      E-Cigarette/Vaping Substances    Nicotine No     THC No     CBD No          Medications:  All current active meds have been reviewed and current meds:  Scheduled Meds:  Current Facility-Administered Medications   Medication Dose Route Frequency Provider Last Rate    acetaminophen  650 mg Oral Q6H PRN DANYELLE Cat      aspirin  81 mg Oral Daily DANYELLE Cat      atorvastatin  20 mg Oral Daily With Dinner DANYELLE Cat      bisacodyl  10 mg Rectal Daily PRN DANYELLE Willis      cefTRIAXone  1,000 mg Intravenous Q24H DANYELLE Cat 1,000 mg (01/10/24 0451)    chlorhexidine  15 mL Mouth/Throat Q12H [facility] DANYELLE Cat      dexamethasone  6 mg Intravenous Daily DANYELLE Cat      dexmedetomidine  0.1-0.7 mcg/kg/hr Intravenous Titrated DANYELLE Cat 0.2 mcg/kg/hr (01/10/24 0218)    dextrose  50 mL/hr Intravenous Continuous DANYELLE Willis 50 mL/hr (01/10/24 1203)    enoxaparin  1 mg/kg Subcutaneous Q12H [facility] DANYELLE Cat      lamoTRIgine  200 mg Oral BID DANYELLE Cat      ondansetron  4 mg Intravenous Q6H PRN DANYELLE Cat      polyethylene glycol  17 g Oral Daily DANYELLE Cat      remdesivir  100 mg Intravenous Q24H DANYELLE Cat    "   senna-docusate sodium  2 tablet Oral BID DANYELLE Cat      topiramate  150 mg Oral BID DANYELLE Cat      vancomycin  15 mg/kg Intravenous Q24H DANYELLE Cat 1,250 mg (01/09/24 2306)    venlafaxine  75 mg Oral Daily DANYELLE Cat       Continuous Infusions:dexmedetomidine, 0.1-0.7 mcg/kg/hr, Last Rate: 0.2 mcg/kg/hr (01/10/24 0218)  dextrose, 50 mL/hr, Last Rate: 50 mL/hr (01/10/24 1203)      PRN Meds:.  acetaminophen    bisacodyl    ondansetron       ROS:   Review of Systems   Unable to perform ROS: Mental status change             Vitals:   /90   Pulse 104   Temp 98.4 °F (36.9 °C) (Oral)   Resp (!) 38   Ht 5' 8\" (1.727 m)   Wt 78.3 kg (172 lb 9.9 oz)   SpO2 94%   BMI 26.25 kg/m²     Physical Exam:   Physical Exam  Vitals and nursing note reviewed. Exam conducted with a chaperone present (pt's , 1:1 monitor).   Constitutional:       General: She is not in acute distress.     Appearance: She is not diaphoretic.      Comments: Pt sleeping upon entering the room, 4 point soft restraints in place    HENT:      Head: Normocephalic and atraumatic.      Nose:      Comments: NG tube in place      Mouth/Throat:      Mouth: Mucous membranes are dry.   Eyes:      Comments: Primary gaze    Cardiovascular:      Rate and Rhythm: Bradycardia present.   Pulmonary:      Comments: On 13L O2 via NC   Psychiatric:      Comments: Unable to cooperate with exam due to mental status, inability to follow commands        Neurologic Exam     Mental Status   Level of consciousness: arousable by verbal stimuli  - does not follow commands  - responding with voice moreso today compared to yesterday  - oriented to self  - oriented to place \"[facility]\"   - significant dysarthria      Cranial Nerves   - primary gaze      Motor Exam - moving extremities x 4 spontaneously, no clear asymmetry       Gait, Coordination, and Reflexes     Gait  Gait: (deferred for patient " safety)          Labs: I have personally reviewed pertinent reports.   Recent Results (from the past 24 hour(s))   ABO/Rh    Collection Time: 01/09/24  7:26 PM   Result Value Ref Range    ABO Grouping A     Rh Factor Negative    CBC and differential    Collection Time: 01/09/24  7:26 PM   Result Value Ref Range    WBC 2.26 (L) 4.31 - 10.16 Thousand/uL    RBC 3.95 3.81 - 5.12 Million/uL    Hemoglobin 11.4 (L) 11.5 - 15.4 g/dL    Hematocrit 35.0 34.8 - 46.1 %    MCV 89 82 - 98 fL    MCH 28.9 26.8 - 34.3 pg    MCHC 32.6 31.4 - 37.4 g/dL    RDW 13.5 11.6 - 15.1 %    MPV 9.4 8.9 - 12.7 fL    Platelets 264 149 - 390 Thousands/uL    nRBC 0 /100 WBCs    Neutrophils Relative 87 (H) 43 - 75 %    Immat GRANS % 2 0 - 2 %    Lymphocytes Relative 5 (L) 14 - 44 %    Monocytes Relative 6 4 - 12 %    Eosinophils Relative 0 0 - 6 %    Basophils Relative 0 0 - 1 %    Neutrophils Absolute 1.98 1.85 - 7.62 Thousands/µL    Immature Grans Absolute 0.04 0.00 - 0.20 Thousand/uL    Lymphocytes Absolute 0.11 (L) 0.60 - 4.47 Thousands/µL    Monocytes Absolute 0.13 (L) 0.17 - 1.22 Thousand/µL    Eosinophils Absolute 0.00 0.00 - 0.61 Thousand/µL    Basophils Absolute 0.00 0.00 - 0.10 Thousands/µL   Comprehensive metabolic panel    Collection Time: 01/09/24  7:26 PM   Result Value Ref Range    Sodium 146 135 - 147 mmol/L    Potassium 3.6 3.5 - 5.3 mmol/L    Chloride 120 (H) 96 - 108 mmol/L    CO2 18 (L) 21 - 32 mmol/L    ANION GAP 8 mmol/L    BUN 20 5 - 25 mg/dL    Creatinine 1.31 (H) 0.60 - 1.30 mg/dL    Glucose 107 65 - 140 mg/dL    Calcium 8.8 8.4 - 10.2 mg/dL    Corrected Calcium 9.4 8.3 - 10.1 mg/dL     (H) 13 - 39 U/L    ALT 89 (H) 7 - 52 U/L    Alkaline Phosphatase 99 34 - 104 U/L    Total Protein 5.7 (L) 6.4 - 8.4 g/dL    Albumin 3.3 (L) 3.5 - 5.0 g/dL    Total Bilirubin 0.70 0.20 - 1.00 mg/dL    eGFR 45 ml/min/1.73sq m   Procalcitonin    Collection Time: 01/09/24  7:26 PM   Result Value Ref Range    Procalcitonin 0.66 (H) <=0.25  ng/ml   C-reactive protein    Collection Time: 01/09/24  7:26 PM   Result Value Ref Range    CRP 88.6 (H) <3.0 mg/L   D-dimer, quantitative    Collection Time: 01/09/24  7:26 PM   Result Value Ref Range    D-Dimer, Quant 1.98 (H) <0.50 ug/ml FEU   CK    Collection Time: 01/09/24  7:26 PM   Result Value Ref Range    Total  (H) 26 - 192 U/L   Lactic acid, plasma (w/reflex if result > 2.0)    Collection Time: 01/09/24  7:26 PM   Result Value Ref Range    LACTIC ACID 0.9 0.5 - 2.0 mmol/L   Blood culture #2    Collection Time: 01/09/24  8:35 PM    Specimen: Hand, Left; Blood   Result Value Ref Range    Blood Culture Received in Microbiology Lab. Culture in Progress.    RAPID HIV 1/2 AB-AG COMBO for 12 years old and above    Collection Time: 01/09/24  8:36 PM   Result Value Ref Range    Rapid HIV 1 AND 2 Non-Reactive Non-Reactive    HIV-1 P24 Ag Screen Non-Reactive Non-Reactive   Chronic Hepatitis Panel    Collection Time: 01/09/24  8:36 PM   Result Value Ref Range    Hepatitis B Surface Ag Non-reactive Non-Reactive    Hepatitis C Ab Non-reactive Non-Reactive    Hep B C IgM Non-reactive Non-Reactive    Hep B Core Total Ab Non-reactive Non-Reactive   Blood culture #1    Collection Time: 01/09/24  8:39 PM    Specimen: Hand, Right; Blood   Result Value Ref Range    Blood Culture Received in Microbiology Lab. Culture in Progress.    CBC and differential    Collection Time: 01/10/24  4:41 AM   Result Value Ref Range    WBC 1.99 (LL) 4.31 - 10.16 Thousand/uL    RBC 4.12 3.81 - 5.12 Million/uL    Hemoglobin 11.9 11.5 - 15.4 g/dL    Hematocrit 36.0 34.8 - 46.1 %    MCV 87 82 - 98 fL    MCH 28.9 26.8 - 34.3 pg    MCHC 33.1 31.4 - 37.4 g/dL    RDW 13.5 11.6 - 15.1 %    MPV 9.6 8.9 - 12.7 fL    Platelets 293 149 - 390 Thousands/uL    nRBC 0 /100 WBCs    Neutrophils Relative 85 (H) 43 - 75 %    Immat GRANS % 2 0 - 2 %    Lymphocytes Relative 4 (L) 14 - 44 %    Monocytes Relative 8 4 - 12 %    Eosinophils Relative 0 0 - 6 %     Basophils Relative 1 0 - 1 %    Neutrophils Absolute 1.71 (L) 1.85 - 7.62 Thousands/µL    Immature Grans Absolute 0.03 0.00 - 0.20 Thousand/uL    Lymphocytes Absolute 0.08 (L) 0.60 - 4.47 Thousands/µL    Monocytes Absolute 0.16 (L) 0.17 - 1.22 Thousand/µL    Eosinophils Absolute 0.00 0.00 - 0.61 Thousand/µL    Basophils Absolute 0.01 0.00 - 0.10 Thousands/µL   Comprehensive metabolic panel    Collection Time: 01/10/24  4:41 AM   Result Value Ref Range    Sodium 148 (H) 135 - 147 mmol/L    Potassium 4.0 3.5 - 5.3 mmol/L    Chloride 121 (H) 96 - 108 mmol/L    CO2 18 (L) 21 - 32 mmol/L    ANION GAP 9 mmol/L    BUN 21 5 - 25 mg/dL    Creatinine 1.20 0.60 - 1.30 mg/dL    Glucose 138 65 - 140 mg/dL    Calcium 8.8 8.4 - 10.2 mg/dL    Corrected Calcium 9.4 8.3 - 10.1 mg/dL     (H) 13 - 39 U/L    ALT 87 (H) 7 - 52 U/L    Alkaline Phosphatase 97 34 - 104 U/L    Total Protein 5.8 (L) 6.4 - 8.4 g/dL    Albumin 3.3 (L) 3.5 - 5.0 g/dL    Total Bilirubin 0.59 0.20 - 1.00 mg/dL    eGFR 50 ml/min/1.73sq m   Procalcitonin    Collection Time: 01/10/24  4:41 AM   Result Value Ref Range    Procalcitonin 0.60 (H) <=0.25 ng/ml   C-reactive protein    Collection Time: 01/10/24  4:41 AM   Result Value Ref Range    .4 (H) <3.0 mg/L   Magnesium    Collection Time: 01/10/24  4:41 AM   Result Value Ref Range    Magnesium 2.2 1.9 - 2.7 mg/dL   Phosphorus    Collection Time: 01/10/24  4:41 AM   Result Value Ref Range    Phosphorus 2.8 2.7 - 4.5 mg/dL   Heparin level    Collection Time: 01/10/24  4:41 AM   Result Value Ref Range    Heparin Anti-Xa 0.53 Reference range = Therapeutic levels IU/mL   Ammonia    Collection Time: 01/10/24 10:30 AM   Result Value Ref Range    Ammonia 46 18 - 72 umol/L   Basic metabolic panel    Collection Time: 01/10/24 10:30 AM   Result Value Ref Range    Sodium 150 (H) 135 - 147 mmol/L    Potassium 4.3 3.5 - 5.3 mmol/L    Chloride 121 (H) 96 - 108 mmol/L    CO2 23 21 - 32 mmol/L    ANION GAP 6 mmol/L     BUN 24 5 - 25 mg/dL    Creatinine 1.33 (H) 0.60 - 1.30 mg/dL    Glucose 116 65 - 140 mg/dL    Calcium 9.4 8.4 - 10.2 mg/dL    eGFR 44 ml/min/1.73sq m   Fingerstick Glucose (POCT)    Collection Time: 01/10/24 11:26 AM   Result Value Ref Range    POC Glucose 117 65 - 140 mg/dl   Basic metabolic panel    Collection Time: 01/10/24 12:19 PM   Result Value Ref Range    Sodium 148 (H) 135 - 147 mmol/L    Potassium 4.3 3.5 - 5.3 mmol/L    Chloride 122 (H) 96 - 108 mmol/L    CO2 18 (L) 21 - 32 mmol/L    ANION GAP 8 mmol/L    BUN 24 5 - 25 mg/dL    Creatinine 1.20 0.60 - 1.30 mg/dL    Glucose 136 65 - 140 mg/dL    Calcium 9.3 8.4 - 10.2 mg/dL    eGFR 50 ml/min/1.73sq m       Imaging: Attending neurologist personally reviewed pertinent imaging in PACS, including MRI brain wo contrast 1/9/24, CTA H/N wwo contrast 1/7/24, CTH wo contrast 1/7/24,  and I have personally reviewed PACS reports.     EKG, Pathology, and Other Studies: Attending neurologist personally reviewed pertinent reports.       VTE Prophylaxis: Enoxaparin (Lovenox)      Counseling / Coordination of Care  Please see attending attestation for amount of time spent.     This note was completed in part utilizing Dragon Software.  Grammatical errors, random word insertions, spelling mistakes, and incomplete sentences may be an occasional consequence of this system secondary to software limitations, ambient noise, and hardware issues.  If you have any questions or concerns about the content, text, or information contained within the body of this dictation, please contact the provider for clarification.

## 2024-01-10 NOTE — OCCUPATIONAL THERAPY NOTE
Occupational Therapy Cancellation Note        Patient Name: Carla Hunt  Today's Date: 1/10/2024         01/10/24 0945   OT Last Visit   OT Visit Date 01/10/24   Note Type   Note Type Cancelled Session   Cancel Reasons Medical status  (Per RN, pt not medically appropriate for therapy treatment at this time. Will follow as appropriate.)         Sarah John, OTR/L

## 2024-01-10 NOTE — DISCHARGE SUMMARY
"  Discharge Summary - Carla Hunt 58 y.o. female MRN: [id number]    Unit/Bed#:  Encounter: [id number]    Admission Date:   Admission Orders (From admission, onward)       Ordered        01/08/24 0136  Inpatient Admission  Once                            Admitting Diagnosis: Abnormal CT of the head [R93.0]  Stroke-like symptoms [R29.90]  COVID-19 [U07.1]    HPI: As per DANYELLE Spann \"Carla Hunt is a 58 y.o. female with a PMH of epilepsy, anxiety and Stage III CKD who presents with stroke like symptoms.  Patient is poor historian due to confusion and aphasia.  Per patient's  she has become increasingly weak and confused over the last 6 days.  Of note patient has poor appetite, has been slurring words, and impaired memory.  She was initially treated for UTI by her PCP with during onset of symptoms.  She was reported to have a bad reaction to Keflex which caused dizziness.  Medication was then changed to Cipro.  However symptoms persisted.  Patient brought to the ED by .  CT of the head reveals tiny foci of acute subarachnoid hemorrhage. Case discussed with [facility] neurosurgery. Ok for patient to stay at [facility], please see note dated 1/7. Patient also noted to be positive for COVID on admission. Will admit inpatient for further evaluation.\"    Procedures Performed:   Orders Placed This Encounter   Procedures    ED ECG Documentation Only       Summary of Hospital Course: Patient was admitted under [facility]'s service on 1/8/2024 as above. She tested positive for COVID 19 on arrival and had been requiring 2-4 L NC oxygen. Since admission patient continued to be encephalopathic, so as a result missed a dose of her AED. There was concern for possible breakthrough seizure activity and she was given IV Vimpat dose as per neurology. A nasogastric tube was placed to facilitate oral medication administration and she was restarted on her home lamotrigine 200 mg BID and her topiramate was " "increased to 150 mg BID. Transfer to [facility] for continuous vEEG initiated but at that time, no available beds. Patient also had increasing oxygen requirements and was on 15 L MFNC. As a result, patient was transferred to the SDU. No further seizure like activity since transfer to SDU. Patient does remain on 15 L MFNC due to hypoxia despite attempts to wean down. This appears to be secondary to COVID 19 infection and she is currently on the moderate pathway. Patient meets sepsis criteria of of unclear source and infectious workup is pending. She is currently on ceftriaxone, vancomycin, acyclovir and ampicillin. Unable to rule out CNS infection at this time and patient is scheduled for MRI brain with contrast as well as an LP. A bed became available at [facility] and patient is now being transferred for continuous vEEG.    Significant Findings, Care, Treatment and Services Provided:   1/7/2024 CT Head wo contrast:   \"Punctate hyperdense foci noted at both frontal region suspicious for tiny foci of acute subarachnoid hemorrhage. No prior studies are available for comparison. Short-term follow-up CT brain in 6 to 12 hours and/or MRI brain is recommended for further evaluation.   Stable left temporal lobe encephalomalacia, compatible with the patient's history of prior left temporal lobectomy.\"  1/7/2024 CTA head and neck w and wo contrast:   \"No acute arterial vascular abnormality in the head or neck. No flow-limiting large vessel arterial vascular stenosis in the head or neck.  Tiny punctate foci of relative increased parenchymal density in the frontal lobes bilaterally, unchanged from 2 hours earlier and probably representing low-density parenchymal calcifications rather than parenchymal hemorrhage. Conservative management with an additional follow-up noncontrast head CT is recommended in 24 to 48 hours.  Reidentified left temporal lobe resection. Dense basal ganglia and cerebellar calcifications again noted.  Groundglass and " "consolidative airspace opacities most suspicious for extensive pneumonia seen throughout the visualized mid and upper lung zones more dense on the right than on the left.  Findings are consistent with the preliminary report from [facility] which was provided shortly after completion of the exam. This examination was also marked \"immediate notification\" in Epic in order to begin the standard process by which the radiology reading room liaison alerts the referring practitioner.\"  1/7/2024 CXR: \"Multifocal pneumonia and/or edema.\"  1/9/2024 MRI Brain wo contrast: \"Tiny focus of abnormal diffusion signal within the right basal ganglia involving the anterior limb of the internal capsule not clearly restricted on the ADC maps but suspicious for small acute lacunar infarct.\"  1/9/2024 CXR: \"Enteric catheter terminates in the stomach.\"    Complications: None    Discharge Diagnosis:   Seizure like activity  Stroke like symptoms  Acute hypoxic respiratory failure  Acute encephalopathy  Sepsis  Stage 3b CKD  Transaminitis  Teto marginal zone B cell Lymphoma  COVID    Medical Problems       Resolved Problems  Date Reviewed: 1/9/2024            Resolved    Non-Hodgkin's lymphoma (HCC) 1/9/2024     Resolved by  Keysha Ortiz PA-C          Condition at Discharge: stable         Discharge instructions/Information to patient and family:   See after visit summary for information provided to patient and family.      Provisions for Follow-Up Care:  See after visit summary for information related to follow-up care and any pertinent home health orders.      PCP: Tony Guevara MD    Disposition:  Discharge-readmit to EMU    Planned Readmission: Yes [facility] ICU for continual vEEG      Discharge Statement   I spent 20 minutes discharging the patient. This time was spent on the day of discharge. I had direct contact with the patient on the day of discharge. Additional documentation is required if more than 30 minutes were spent on " discharge.     Discharge Medications:  See after visit summary for reconciled discharge medications provided to patient and family.

## 2024-01-10 NOTE — ASSESSMENT & PLAN NOTE
COVID positive on admission (1/7/24)  Initially admitted on RA, O2 requirements slowly increasing  Now on 15L MFNC    On CTA H/N, imaged lungs note - groundglass and consolidative airspace opacities most suspicious for extensive pneumonia seen throughout the visualized mid and upper lung zones more dense on the right than on the left    Plan:  See plan for COVID as above  Titrate oxygen to maintain SpO2 >90%

## 2024-01-10 NOTE — ASSESSMENT & PLAN NOTE
Lab Results   Component Value Date    EGFR 45 01/09/2024    EGFR 46 01/09/2024    EGFR 42 01/08/2024    CREATININE 1.31 (H) 01/09/2024    CREATININE 1.29 01/09/2024    CREATININE 1.38 (H) 01/08/2024     Cr on admission 1.50 with baseline 1.1-1.3    Plan:  Trend renal indices  Monitor I&O's  Avoid nephrotoxins

## 2024-01-11 ENCOUNTER — APPOINTMENT (INPATIENT)
Dept: NEUROLOGY | Facility: CLINIC | Age: 59
DRG: 003 | End: 2024-01-11
Payer: COMMERCIAL

## 2024-01-11 LAB
ALBUMIN SERPL BCP-MCNC: 3.4 G/DL (ref 3.5–5)
ALP SERPL-CCNC: 92 U/L (ref 34–104)
ALT SERPL W P-5'-P-CCNC: 77 U/L (ref 7–52)
ANION GAP SERPL CALCULATED.3IONS-SCNC: 11 MMOL/L
ANION GAP SERPL CALCULATED.3IONS-SCNC: 12 MMOL/L
AST SERPL W P-5'-P-CCNC: 97 U/L (ref 13–39)
BASE EXCESS BLDA CALC-SCNC: -7 MMOL/L
BASOPHILS # BLD MANUAL: 0 THOUSAND/UL (ref 0–0.1)
BASOPHILS NFR MAR MANUAL: 0 % (ref 0–1)
BILIRUB SERPL-MCNC: 0.53 MG/DL (ref 0.2–1)
BUN SERPL-MCNC: 26 MG/DL (ref 5–25)
BUN SERPL-MCNC: 28 MG/DL (ref 5–25)
BURR CELLS BLD QL SMEAR: PRESENT
CALCIUM ALBUM COR SERPL-MCNC: 9.5 MG/DL (ref 8.3–10.1)
CALCIUM SERPL-MCNC: 9 MG/DL (ref 8.4–10.2)
CALCIUM SERPL-MCNC: 9 MG/DL (ref 8.4–10.2)
CHLORIDE SERPL-SCNC: 120 MMOL/L (ref 96–108)
CHLORIDE SERPL-SCNC: 121 MMOL/L (ref 96–108)
CO2 SERPL-SCNC: 18 MMOL/L (ref 21–32)
CO2 SERPL-SCNC: 20 MMOL/L (ref 21–32)
CREAT SERPL-MCNC: 1.23 MG/DL (ref 0.6–1.3)
CREAT SERPL-MCNC: 1.26 MG/DL (ref 0.6–1.3)
CRP SERPL QL: 76.6 MG/L
EOSINOPHIL # BLD MANUAL: 0 THOUSAND/UL (ref 0–0.4)
EOSINOPHIL NFR BLD MANUAL: 0 % (ref 0–6)
ERYTHROCYTE [DISTWIDTH] IN BLOOD BY AUTOMATED COUNT: 13.8 % (ref 11.6–15.1)
GFR SERPL CREATININE-BSD FRML MDRD: 47 ML/MIN/1.73SQ M
GFR SERPL CREATININE-BSD FRML MDRD: 48 ML/MIN/1.73SQ M
GLUCOSE SERPL-MCNC: 168 MG/DL (ref 65–140)
GLUCOSE SERPL-MCNC: 185 MG/DL (ref 65–140)
HCO3 BLDA-SCNC: 17.6 MMOL/L (ref 22–28)
HCT VFR BLD AUTO: 37.5 % (ref 34.8–46.1)
HGB BLD-MCNC: 12.2 G/DL (ref 11.5–15.4)
INR PPP: 1.32 (ref 0.84–1.19)
L PNEUMO1 AG UR QL IA.RAPID: NEGATIVE
LACTATE SERPL-SCNC: 1.3 MMOL/L (ref 0.5–2)
LAMOTRIGINE SERPL-MCNC: 21.8 UG/ML (ref 2–20)
LYMPHOCYTES # BLD AUTO: 0.15 THOUSAND/UL (ref 0.6–4.47)
LYMPHOCYTES # BLD AUTO: 5 % (ref 14–44)
MAGNESIUM SERPL-MCNC: 2.1 MG/DL (ref 1.9–2.7)
MCH RBC QN AUTO: 29.2 PG (ref 26.8–34.3)
MCHC RBC AUTO-ENTMCNC: 32.5 G/DL (ref 31.4–37.4)
MCV RBC AUTO: 90 FL (ref 82–98)
MONOCYTES # BLD AUTO: 0 THOUSAND/UL (ref 0–1.22)
MONOCYTES NFR BLD: 0 % (ref 4–12)
MRSA NOSE QL CULT: NORMAL
NEUTROPHILS # BLD MANUAL: 2.76 THOUSAND/UL (ref 1.85–7.62)
NEUTS BAND NFR BLD MANUAL: 7 % (ref 0–8)
NEUTS SEG NFR BLD AUTO: 88 % (ref 43–75)
O2 CT BLDA-SCNC: 17.1 ML/DL (ref 16–23)
OVALOCYTES BLD QL SMEAR: PRESENT
OXYHGB MFR BLDA: 94 % (ref 94–97)
PCO2 BLDA: 32.6 MM HG (ref 36–44)
PH BLDA: 7.35 [PH] (ref 7.35–7.45)
PHOSPHATE SERPL-MCNC: 2.8 MG/DL (ref 2.7–4.5)
PLATELET # BLD AUTO: 332 THOUSANDS/UL (ref 149–390)
PLATELET BLD QL SMEAR: ADEQUATE
PMV BLD AUTO: 9.7 FL (ref 8.9–12.7)
PO2 BLDA: 76.7 MM HG (ref 75–129)
POIKILOCYTOSIS BLD QL SMEAR: PRESENT
POTASSIUM SERPL-SCNC: 3.9 MMOL/L (ref 3.5–5.3)
POTASSIUM SERPL-SCNC: 3.9 MMOL/L (ref 3.5–5.3)
PROCALCITONIN SERPL-MCNC: 0.37 NG/ML
PROT SERPL-MCNC: 5.8 G/DL (ref 6.4–8.4)
PROTHROMBIN TIME: 16.3 SECONDS (ref 11.6–14.5)
RBC # BLD AUTO: 4.18 MILLION/UL (ref 3.81–5.12)
RBC MORPH BLD: PRESENT
S PNEUM AG UR QL: NEGATIVE
SODIUM SERPL-SCNC: 150 MMOL/L (ref 135–147)
SODIUM SERPL-SCNC: 152 MMOL/L (ref 135–147)
TOPIRAMATE SERPL-MCNC: 13.1 UG/ML (ref 2–25)
VANCOMYCIN SERPL-MCNC: 28.7 UG/ML (ref 10–20)
WBC # BLD AUTO: 2.91 THOUSAND/UL (ref 4.31–10.16)

## 2024-01-11 PROCEDURE — 99232 SBSQ HOSP IP/OBS MODERATE 35: CPT | Performed by: PSYCHIATRY & NEUROLOGY

## 2024-01-11 PROCEDURE — 85007 BL SMEAR W/DIFF WBC COUNT: CPT | Performed by: NURSE PRACTITIONER

## 2024-01-11 PROCEDURE — 82805 BLOOD GASES W/O2 SATURATION: CPT | Performed by: NURSE PRACTITIONER

## 2024-01-11 PROCEDURE — 94760 N-INVAS EAR/PLS OXIMETRY 1: CPT

## 2024-01-11 PROCEDURE — 95715 VEEG EA 12-26HR INTMT MNTR: CPT

## 2024-01-11 PROCEDURE — 80048 BASIC METABOLIC PNL TOTAL CA: CPT | Performed by: NURSE PRACTITIONER

## 2024-01-11 PROCEDURE — 80202 ASSAY OF VANCOMYCIN: CPT | Performed by: NURSE PRACTITIONER

## 2024-01-11 PROCEDURE — 80053 COMPREHEN METABOLIC PANEL: CPT | Performed by: NURSE PRACTITIONER

## 2024-01-11 PROCEDURE — 99255 IP/OBS CONSLTJ NEW/EST HI 80: CPT | Performed by: INTERNAL MEDICINE

## 2024-01-11 PROCEDURE — 83735 ASSAY OF MAGNESIUM: CPT | Performed by: NURSE PRACTITIONER

## 2024-01-11 PROCEDURE — 84100 ASSAY OF PHOSPHORUS: CPT | Performed by: NURSE PRACTITIONER

## 2024-01-11 PROCEDURE — 85027 COMPLETE CBC AUTOMATED: CPT | Performed by: NURSE PRACTITIONER

## 2024-01-11 PROCEDURE — 85610 PROTHROMBIN TIME: CPT

## 2024-01-11 PROCEDURE — 94003 VENT MGMT INPAT SUBQ DAY: CPT

## 2024-01-11 PROCEDURE — 95700 EEG CONT REC W/VID EEG TECH: CPT

## 2024-01-11 PROCEDURE — 86140 C-REACTIVE PROTEIN: CPT | Performed by: NURSE PRACTITIONER

## 2024-01-11 PROCEDURE — 99291 CRITICAL CARE FIRST HOUR: CPT | Performed by: EMERGENCY MEDICINE

## 2024-01-11 PROCEDURE — 83605 ASSAY OF LACTIC ACID: CPT | Performed by: NURSE PRACTITIONER

## 2024-01-11 PROCEDURE — 84145 PROCALCITONIN (PCT): CPT | Performed by: NURSE PRACTITIONER

## 2024-01-11 RX ORDER — SODIUM CHLORIDE 450 MG/100ML
100 INJECTION, SOLUTION INTRAVENOUS CONTINUOUS
Status: DISCONTINUED | OUTPATIENT
Start: 2024-01-11 | End: 2024-01-14

## 2024-01-11 RX ORDER — TOPIRAMATE 100 MG/1
100 TABLET, FILM COATED ORAL 2 TIMES DAILY
Status: CANCELLED | OUTPATIENT
Start: 2024-01-11

## 2024-01-11 RX ORDER — HALOPERIDOL 5 MG/ML
2 INJECTION INTRAMUSCULAR ONCE
Status: COMPLETED | OUTPATIENT
Start: 2024-01-11 | End: 2024-01-11

## 2024-01-11 RX ORDER — VENLAFAXINE 25 MG/1
25 TABLET ORAL
Status: DISCONTINUED | OUTPATIENT
Start: 2024-01-11 | End: 2024-02-13

## 2024-01-11 RX ORDER — POTASSIUM CHLORIDE 20MEQ/15ML
20 LIQUID (ML) ORAL ONCE
Status: COMPLETED | OUTPATIENT
Start: 2024-01-11 | End: 2024-01-11

## 2024-01-11 RX ADMIN — ACYCLOVIR SODIUM 650 MG: 50 INJECTION, SOLUTION INTRAVENOUS at 12:42

## 2024-01-11 RX ADMIN — CEFTRIAXONE 2000 MG: 1 INJECTION, POWDER, FOR SOLUTION INTRAMUSCULAR; INTRAVENOUS at 06:18

## 2024-01-11 RX ADMIN — LAMOTRIGINE 200 MG: 100 TABLET ORAL at 17:09

## 2024-01-11 RX ADMIN — HALOPERIDOL LACTATE 2 MG: 5 INJECTION, SOLUTION INTRAMUSCULAR at 11:24

## 2024-01-11 RX ADMIN — DEXMEDETOMIDINE HYDROCHLORIDE 0.6 MCG/KG/HR: 4 INJECTION, SOLUTION INTRAVENOUS at 16:30

## 2024-01-11 RX ADMIN — VANCOMYCIN HYDROCHLORIDE 1250 MG: 10 INJECTION, POWDER, LYOPHILIZED, FOR SOLUTION INTRAVENOUS at 22:25

## 2024-01-11 RX ADMIN — AMPICILLIN SODIUM 2000 MG: 2 INJECTION, POWDER, FOR SOLUTION INTRAVENOUS at 21:31

## 2024-01-11 RX ADMIN — AMPICILLIN SODIUM 2000 MG: 2 INJECTION, POWDER, FOR SOLUTION INTRAVENOUS at 01:15

## 2024-01-11 RX ADMIN — TOPIRAMATE 150 MG: 100 TABLET, FILM COATED ORAL at 10:12

## 2024-01-11 RX ADMIN — CHLORHEXIDINE GLUCONATE 15 ML: 1.2 SOLUTION ORAL at 09:39

## 2024-01-11 RX ADMIN — ACYCLOVIR SODIUM 650 MG: 50 INJECTION, SOLUTION INTRAVENOUS at 05:30

## 2024-01-11 RX ADMIN — AMPICILLIN SODIUM 2000 MG: 2 INJECTION, POWDER, FOR SOLUTION INTRAVENOUS at 14:06

## 2024-01-11 RX ADMIN — ATORVASTATIN CALCIUM 20 MG: 20 TABLET, FILM COATED ORAL at 17:02

## 2024-01-11 RX ADMIN — LAMOTRIGINE 200 MG: 100 TABLET ORAL at 10:13

## 2024-01-11 RX ADMIN — CHLORHEXIDINE GLUCONATE 15 ML: 1.2 SOLUTION ORAL at 21:32

## 2024-01-11 RX ADMIN — SODIUM CHLORIDE 100 ML/HR: 0.45 INJECTION, SOLUTION INTRAVENOUS at 02:00

## 2024-01-11 RX ADMIN — DEXMEDETOMIDINE HYDROCHLORIDE 1.5 MCG/KG/HR: 4 INJECTION, SOLUTION INTRAVENOUS at 21:49

## 2024-01-11 RX ADMIN — POTASSIUM CHLORIDE 20 MEQ: 1.5 SOLUTION ORAL at 02:00

## 2024-01-11 RX ADMIN — CEFTRIAXONE 2000 MG: 1 INJECTION, POWDER, FOR SOLUTION INTRAMUSCULAR; INTRAVENOUS at 18:39

## 2024-01-11 RX ADMIN — AMPICILLIN SODIUM 2000 MG: 2 INJECTION, POWDER, FOR SOLUTION INTRAVENOUS at 06:18

## 2024-01-11 RX ADMIN — REMDESIVIR 100 MG: 100 INJECTION, POWDER, LYOPHILIZED, FOR SOLUTION INTRAVENOUS at 17:15

## 2024-01-11 RX ADMIN — POLYETHYLENE GLYCOL 3350 17 G: 17 POWDER, FOR SOLUTION ORAL at 09:39

## 2024-01-11 RX ADMIN — AMPICILLIN SODIUM 2000 MG: 2 INJECTION, POWDER, FOR SOLUTION INTRAVENOUS at 18:22

## 2024-01-11 RX ADMIN — SODIUM CHLORIDE 100 ML/HR: 0.45 INJECTION, SOLUTION INTRAVENOUS at 14:44

## 2024-01-11 RX ADMIN — VENLAFAXINE 25 MG: 25 TABLET ORAL at 14:06

## 2024-01-11 RX ADMIN — SENNOSIDES, DOCUSATE SODIUM 2 TABLET: 8.6; 5 TABLET ORAL at 09:39

## 2024-01-11 RX ADMIN — AMPICILLIN SODIUM 2000 MG: 2 INJECTION, POWDER, FOR SOLUTION INTRAVENOUS at 09:38

## 2024-01-11 RX ADMIN — DEXMEDETOMIDINE HYDROCHLORIDE 1.5 MCG/KG/HR: 4 INJECTION, SOLUTION INTRAVENOUS at 01:23

## 2024-01-11 RX ADMIN — TOPIRAMATE 150 MG: 100 TABLET, FILM COATED ORAL at 17:09

## 2024-01-11 RX ADMIN — SENNOSIDES, DOCUSATE SODIUM 2 TABLET: 8.6; 5 TABLET ORAL at 17:10

## 2024-01-11 RX ADMIN — VENLAFAXINE 25 MG: 25 TABLET ORAL at 17:11

## 2024-01-11 RX ADMIN — DEXAMETHASONE SODIUM PHOSPHATE 6 MG: 10 INJECTION, SOLUTION INTRAMUSCULAR; INTRAVENOUS at 09:39

## 2024-01-11 NOTE — SPEECH THERAPY NOTE
Speech Language/Pathology  Speech-Language Pathology Screen      Patient Name: Carla Hunt    Today's Date: 2024     Problem List  Active Problems:    Localization-related symptomatic epilepsy and epileptic syndromes with complex partial seizures, intractable, without status epilepticus (HCC)    Stroke-like symptoms    Encephalopathy acute      Past Medical History  Past Medical History:   Diagnosis Date    Hx of hypercalcemia     Lymphoma (HCC) 2021    Parathyroid disease (HCC)     Seizures (HCC)     Situational depression     24 yr old son  from drug overdose       Past Surgical History  Past Surgical History:   Procedure Laterality Date    CRANIOTOMY FOR TEMPORAL LOBECTOMY Left        Summary   Pt not appropriate for intervention today, poor alertness, hi migue w/ desats.  Will continue to follow and assess as able.         Current Medical Status  Pt is a 58 y.o. female who presented to [facility]  with change in MS, stroke like symptoms.  Stroke alert called w/ imaging on the  finding punctate hyperdense foci in both frontal regions susp for tiny foci of acute subarachnoid hemorrhage. Covid +.   She contiued to have decline in status as well as increased sz activity.  Transferred to [facility] for continuous EEG on 1/10/24.      Current Precautions:  Fall  Aspiration  Contact  AIrborn    Allergies:  No known food allergies    Past medical history:  Please see H&P for details  epilepsy, anxiety, CKD 3, surgical resection of left temporal lobe in .    Special Studies:  MRI brain seizure-Previously visualized diffusion signal abnormality in the right basal ganglia is no longer identified and may have been artifactual given underlying susceptibility artifact in the bilateral globus pallidus.     CXR 1/10/24-1. Persistence of bilateral hazy diffuse airspace opacities in the lungs, without significant change, again suspicious for pneumonia  2. Nasogastric tube extends below left  hemidiaphragm

## 2024-01-11 NOTE — UTILIZATION REVIEW
Initial Clinical Review    Admission: Date/Time/Statement:   Admission Orders (From admission, onward)       Ordered        01/10/24 1954  Inpatient Admission  Once                          Orders Placed This Encounter   Procedures    Inpatient Admission     Standing Status:   Standing     Number of Occurrences:   1     Order Specific Question:   Level of Care     Answer:   Critical Care [15]     Order Specific Question:   Estimated length of stay     Answer:   More than 2 Midnights     Order Specific Question:   Certification     Answer:   I certify that inpatient services are medically necessary for this patient for a duration of greater than two midnights. See H&P and MD Progress Notes for additional information about the patient's course of treatment.       Initial Presentation: 58 y.o. female with PMHx of CKD KKK, epilepsy, HLD, L anterior temporal lobe resection in 2007, B-cell lymphoma on chemo presents to \[facility]\"" transferred from [facility] where she initially presented on 1/8 with c/o abdominal pain. In [facility] ED pt found to be covid pos with symptoms concerning for stroke. Her initial CT showed a possible subarachnoid however given the size felt okay to be managed at the [facility]. Her neurologic w/u was notable for a possible lacunar infarct and had a negative routine EEG. The pt did not not have improvement in mental status and worsening respiratory status. She was started on COVID protocol treatments, receiving Decadron/full-strength Lovenox/Actemra on 1/9 and remdesivir. After discussion with neurology, there was high clinical suspicion for intracranial infection and a lumbar puncture was recommended. She was also started on abx for possible meningitis. Her mental status continued to worsen and she was transferred to \[facility]\"" for vEEG.  Admitted Inpatient to ICU with Acute Encephalopathy, Acute hypoxemic respiratory failure, Covid-19  On exam GCS 6, babbling, occasional nonsense sentences. Answers all  "yes/no questions with \"no\". Spontaneously moves all extremities. Lifts b/l LE in response to \"wiggle toes\". Moves all fingers in response to \"show a thumbs-up\". Unable to accurately assess strength/sensation due to lack of participation. Ortiz in place.   Plan: neuro checks q1h. Telemetry. Plan for MRI w/w/o, vEEG and LP although has already been on antimicrobials for 24h. ID consult in AM. Continue current ASM - home Lamictal 100 mg BID, Topamax increased to 150 mg BID. To add Keppra load if evidence of seizures on EEG. ASA, statin. S/p Actemra, continue Remdesivir and Decadron 6 mg daily, supplemental O2. Monitor hypernatremia, Isolyte for now        Date: 1/11   Day 2: MRI completed, did not re-demosntrate possible infarction seen earlier, maintained on Precedex overnight, progressed to requiring HFNC, IV fluids transitioned to 0.45 NS, connected to vEEG, LP to be performed this morning 24 hours after last dose of Lovenox.  GCS 6, felxion in all extremities to noxious stimuli.     ID consult --- Unclear etiology of encephalopathy - consider seizures with epilepsy history vs infectious vs metabolic. Imaging findings consistent with pneumonia - on appropriate treatment for CAP. Covid pathway. Continue abx regimen at this time. LP pending, though pt has been on antibiotics for several days. F/U cultures. Monitor VEEG results. Monitor vitals closely.continue neuro checks q1h. Lamictal/Topamax- awaiting drug levels, continue Effexor. Bowel regimen, PPI. IVFs. Ortiz cath, monitor I/Os.           Wt Readings from Last 1 Encounters:   01/10/24 77.2 kg (170 lb 3.1 oz)     Vital Signs:   Date/Time Temp Pulse Resp BP MAP (mmHg) SpO2 FiO2 (%) Calculated FIO2 (%) - Nasal Cannula O2 Flow Rate (L/min) Nasal Cannula O2 Flow Rate (L/min) O2 Device O2 Interface Device Patient Position - Orthostatic VS   01/11/24 1400 -- 48 Abnormal  17 115/68 84 90 % -- -- -- -- -- -- --   01/11/24 1330 -- 50 Abnormal  20 117/67 84 96 % -- -- -- " -- -- -- --   01/11/24 1300 -- 46 Abnormal  19 110/75 95 90 % -- -- -- -- -- -- --   01/11/24 1200 97 °F (36.1 °C) Abnormal  68 26 Abnormal  106/65 74 87 % Abnormal  -- -- -- -- -- -- --   01/11/24 1100 -- 54 Abnormal  20 91/55 61 Abnormal  96 % -- -- -- -- -- -- --   01/11/24 1030 -- 68 37 Abnormal  76/60 Abnormal  65 94 % -- -- -- -- -- -- --   01/11/24 1000 -- 46 Abnormal  18 -- -- 90 % -- -- -- -- -- -- --   01/11/24 0952 -- -- -- -- -- 95 % -- -- -- -- -- VIDHYA calderón --   01/11/24 0930 -- 46 Abnormal  19 93/54 70 88 % Abnormal  -- -- -- -- -- -- --   01/11/24 0830 95.9 °F (35.5 °C) Abnormal  64 19 82/60 Abnormal  75 94 % -- -- -- -- -- -- --   01/11/24 0730 -- 84 31 Abnormal  110/72 94 80 % Abnormal  -- -- -- -- -- -- --   01/11/24 0600 -- 50 Abnormal  18 98/59 77 96 % -- -- -- -- -- -- --   01/11/24 0500 -- 52 Abnormal  20 114/71 84 97 % -- -- -- -- -- -- --   01/11/24 0400 99.6 °F (37.6 °C) 50 Abnormal  20 143/80 102 97 % 70 -- 50 L/min -- High flow nasal cannula -- Lying   01/11/24 0300 -- 52 Abnormal  16 158/84 109 93 % -- -- -- -- -- -- --   01/11/24 0245 -- -- -- -- -- -- -- -- -- -- -- VIDHYA calderón --   01/11/24 0244 -- -- -- -- -- -- 50 -- 50 L/min -- High flow nasal cannula -- --   01/11/24 0200 -- 56 17 123/69 94 98 % -- -- -- -- -- -- --   01/11/24 0100 -- 54 Abnormal  18 147/81 108 94 % -- -- -- -- -- -- --   01/11/24 0000 99.9 °F (37.7 °C) 58 15 135/81 95 99 % 70 -- 50 L/min -- High flow nasal cannula -- Lying   01/10/24 2200 -- 62 20 100/56 73 95 % -- -- -- -- -- -- --   01/10/24 2100 -- 54 Abnormal  24 Abnormal  124/73 95 93 % 60 220 -- 50 L/min High flow nasal cannula -- --   01/10/24 2027 -- -- -- -- -- -- -- -- -- -- -- HFNC prongs --   01/10/24 2003 97.6 °F (36.4 °C) 85 22 119/84 -- -- -- -- -- -- -- -- --   01/10/24 2000 97.9 °F (36.6 °C) 82 26 Abnormal  119/84 98 94 % -- -- -- -- -- -- --     Pertinent Labs/Diagnostic Test Results:   MRI brain seizure wo and w contrast   Final Result by  Manuel Mandel MD (01/11 0048)      Previously visualized diffusion signal abnormality in the right basal ganglia is no longer identified and may have been artifactual given underlying susceptibility artifact in the bilateral globus pallidus.         Workstation performed: [id number]         XR chest portable ICU   Final Result by Cooper Cai MD (01/11 0849)      1. Persistence of bilateral hazy diffuse airspace opacities in the lungs, without significant change, again suspicious for pneumonia   2. Nasogastric tube extends below left hemidiaphragm                        Workstation performed: [id number]         FL IN-patient lumbar puncture    (Results Pending)     Results from last 7 days   Lab Units 01/07/24 1916   SARS-COV-2  Positive*     Results from last 7 days   Lab Units 01/11/24  0454 01/10/24  0441 01/09/24 1926 01/09/24  0343 01/08/24  0410 01/07/24 1916   WBC Thousand/uL 2.91* 1.99* 2.26* 3.01* 2.37* 3.44*   HEMOGLOBIN g/dL 12.2 11.9 11.4* 12.5 11.9 13.5   HEMATOCRIT % 37.5 36.0 35.0 38.1 35.1 40.7   PLATELETS Thousands/uL 332 293 264 291 251 308   NEUTROS ABS Thousands/µL  --  1.71* 1.98  --   --  2.99   BANDS PCT % 7  --   --   --   --   --      Results from last 7 days   Lab Units 01/11/24  0454 01/11/24  0023 01/10/24  1809 01/10/24  1219 01/10/24  1030 01/10/24  0441 01/09/24 1926 01/09/24  0342   SODIUM mmol/L 152* 150* 149* 148* 150* 148*   < > 143   POTASSIUM mmol/L 3.9 3.9 4.0 4.3 4.3 4.0   < > 4.1   CHLORIDE mmol/L 120* 121* 122* 122* 121* 121*   < > 117*   CO2 mmol/L 20* 18* 20* 18* 23 18*   < > 19*   ANION GAP mmol/L 12 11 7 8 6 9   < > 7   BUN mg/dL 26* 28* 28* 24 24 21   < > 18   CREATININE mg/dL 1.23 1.26 1.30 1.20 1.33* 1.20   < > 1.29   EGFR ml/min/1.73sq m 48 47 45 50 44 50   < > 46   CALCIUM mg/dL 9.0 9.0 9.2 9.3 9.4 8.8   < > 9.1   MAGNESIUM mg/dL 2.1  --   --   --   --  2.2  --  2.0   PHOSPHORUS mg/dL 2.8  --   --   --   --  2.8  --   --     < > = values in this  interval not displayed.     Results from last 7 days   Lab Units 01/11/24  0454 01/10/24  1030 01/10/24  0441 01/09/24 1926 01/07/24 1916   AST U/L 97*  --  125* 135* 141*   ALT U/L 77*  --  87* 89* 92*   ALK PHOS U/L 92  --  97 99 123*   TOTAL PROTEIN g/dL 5.8*  --  5.8* 5.7* 6.5   ALBUMIN g/dL 3.4*  --  3.3* 3.3* 3.7   TOTAL BILIRUBIN mg/dL 0.53  --  0.59 0.70 0.60   BILIRUBIN DIRECT mg/dL  --   --   --   --  0.23*   AMMONIA umol/L  --  46  --   --   --      Results from last 7 days   Lab Units 01/10/24  1126 01/07/24  1904   POC GLUCOSE mg/dl 117 138     Results from last 7 days   Lab Units 01/11/24  0454 01/11/24  0023 01/10/24  1809 01/10/24  1219 01/10/24  1030 01/10/24  0441 01/09/24  1926 01/09/24  0342 01/08/24  0410 01/07/24 1916   GLUCOSE RANDOM mg/dL 185* 168* 155* 136 116 138 107 102 109 131         Results from last 7 days   Lab Units 01/09/24 0343   HEMOGLOBIN A1C % 6.6*   EAG mg/dl 143     Results from last 7 days   Lab Units 01/11/24  0512   PH ART  7.350   PCO2 ART mm Hg 32.6*   PO2 ART mm Hg 76.7   HCO3 ART mmol/L 17.6*   BASE EXC ART mmol/L -7.0   O2 CONTENT ART mL/dL 17.1   O2 HGB, ARTERIAL % 94.0     Results from last 7 days   Lab Units 01/09/24 1926   CK TOTAL U/L 314*     Results from last 7 days   Lab Units 01/07/24 1916   HS TNI 0HR ng/L 12     Results from last 7 days   Lab Units 01/09/24 1926   D-DIMER QUANTITATIVE ug/ml FEU 1.98*     Results from last 7 days   Lab Units 01/07/24 1916   PROTIME seconds 14.6*   INR  1.07   PTT seconds 38*     Results from last 7 days   Lab Units 01/11/24  0611 01/10/24  0441 01/09/24 1926 01/09/24  0335   PROCALCITONIN ng/ml 0.37* 0.60* 0.66* 0.63*     Results from last 7 days   Lab Units 01/11/24  0454 01/09/24  1926 01/09/24  0335 01/07/24  1916   LACTIC ACID mmol/L 1.3 0.9 1.0 1.8     Results from last 7 days   Lab Units 01/09/24 2036   HEP B S AG  Non-reactive   HEP C AB  Non-reactive   HEP B C IGM  Non-reactive   HEP B C TOTAL AB   Non-reactive     Results from last 7 days   Lab Units 24  0454 01/10/24  0441 24   CRP mg/L 76.6* 110.4* 88.6*     Results from last 7 days   Lab Units 24   CLARITY UA  Clear   COLOR UA  Light Yellow   SPEC GRAV UA  1.038*   PH UA  6.5   GLUCOSE UA mg/dl Negative   KETONES UA mg/dl Negative   BLOOD UA  Small*   PROTEIN UA mg/dl Trace*   NITRITE UA  Negative   BILIRUBIN UA  Negative   UROBILINOGEN UA (BE) mg/dl <2.0   LEUKOCYTES UA  Small*   WBC UA /hpf 4-10*   RBC UA /hpf 2-4*   BACTERIA UA /hpf None Seen   EPITHELIAL CELLS WET PREP /hpf Occasional     Results from last 7 days   Lab Units 01/10/24  2115 01/07/24  1916   STREP PNEUMONIAE ANTIGEN, URINE  Negative  --    LEGIONELLA URINARY ANTIGEN  Negative  --    INFLUENZA A PCR   --  Negative   INFLUENZA B PCR   --  Negative   RSV PCR   --  Negative     Results from last 7 days   Lab Units 24   BLOOD CULTURE  No Growth at 24 hrs. No Growth at 24 hrs.  --  No Growth at 72 hrs. No Growth at 72 hrs.   URINE CULTURE   --   --  60,000-69,000 cfu/ml Lactobacillus species*  --   --      Results from last 7 days   Lab Units 24   VANCOMYCIN RM ug/mL 28.7*       Past Medical History:   Diagnosis Date    Hx of hypercalcemia     Lymphoma (HCC) 2021    Parathyroid disease (HCC)     Seizures ([facility])     Situational depression     24 yr old son  from drug overdose     Present on Admission:   Encephalopathy acute   Localization-related symptomatic epilepsy and epileptic syndromes with complex partial seizures, intractable, without status epilepticus (HCC)   Stroke-like symptoms      Admitting Diagnosis: Stroke-like symptoms [R29.90]  Age/Sex: 58 y.o. female  Admission Orders:  Scheduled Medications:  [START ON 2024] acyclovir, 10 mg/kg (Ideal), Intravenous, Q12H  ampicillin, 2,000 mg, Intravenous, Q4H  atorvastatin, 20 mg, Oral, Daily With Dinner  cefTRIAXone,  2,000 mg, Intravenous, Q12H  chlorhexidine, 15 mL, Mouth/Throat, Q12H SARTHAK  dexamethasone, 6 mg, Intravenous, Daily  lamoTRIgine, 200 mg, Oral, BID  polyethylene glycol, 17 g, Oral, Daily  remdesivir, 100 mg, Intravenous, Q24H  senna-docusate sodium, 2 tablet, Oral, BID  topiramate, 150 mg, Oral, BID  vancomycin, 1,250 mg, Intravenous, Q24H  venlafaxine, 25 mg, Oral, TID With Meals    Continuous IV Infusions:  dexmedetomidine, 0.1-1.5 mcg/kg/hr, Intravenous, Titrated  sodium chloride, 100 mL/hr, Intravenous, Continuous    PRN Meds:  acetaminophen, 650 mg, Oral, Q6H PRN  bisacodyl, 10 mg, Rectal, Daily PRN  fentanyl citrate (PF), 25 mcg, Intravenous, Q10 Min PRN  ondansetron, 4 mg, Intravenous, Q6H PRN        IP CONSULT TO CASE MANAGEMENT  IP CONSULT TO PHARMACY  IP CONSULT TO INFECTIOUS DISEASES    Network Utilization Review Department  ATTENTION: Please call with any questions or concerns to [phone] and carefully listen to the prompts so that you are directed to the right person. All voicemails are confidential.   For Discharge needs, contact Care Management DC Support Team at [phone] opt. 2  Send all requests for admission clinical reviews, approved or denied determinations and any other requests to dedicated fax number below belonging to the campus where the patient is receiving treatment. List of dedicated fax numbers for the Facilities:  FACILITY NAME UR FAX NUMBER   ADMISSION DENIALS (Administrative/Medical Necessity) [phone]   DISCHARGE SUPPORT TEAM (NETWORK) [phone]   PARENT CHILD HEALTH (Maternity/NICU/Pediatrics) [phone]   [facility] [phone]   [facility] [phone]   [facility] [phone]   [facility] [phone]   Critical access hospital [phone]   General acute hospital [phone]   [facility] [phone]    [facility] [phone]   [facility] [phone]   [facility] [phone]   [facility] [phone]

## 2024-01-11 NOTE — UTILIZATION REVIEW
NOTIFICATION OF ADMISSION DISCHARGE   This is a Notification of Discharge from [facility]. Please be advised that this patient has been discharge from our facility. Below you will find the admission and discharge date and time including the patient’s disposition.   UTILIZATION REVIEW CONTACT:  Leticia Elizabeth  Utilization   Network Utilization Review Department  Phone: [phone] x carefully listen to the prompts. All voicemails are confidential.  Email: [email or web address]     ADMISSION INFORMATION  PRESENTATION DATE: 1/7/2024  7:01 PM  OBERVATION ADMISSION DATE:   INPATIENT ADMISSION DATE: 1/8/24  1:36 AM   DISCHARGE DATE: 1/10/2024  6:46 PM   DISPOSITION:[facility] Utilization Review Department  ATTENTION: Please call with any questions or concerns to [phone] and carefully listen to the prompts so that you are directed to the right person. All voicemails are confidential.   For Discharge needs, contact Care Management DC Support Team at [phone] opt. 2  Send all requests for admission clinical reviews, approved or denied determinations and any other requests to dedicated fax number below belonging to the campus where the patient is receiving treatment. List of dedicated fax numbers for the Facilities:  FACILITY NAME UR FAX NUMBER   ADMISSION DENIALS (Administrative/Medical Necessity) [phone]   DISCHARGE SUPPORT TEAM ([facility]) [phone]   PARENT CHILD HEALTH (Maternity/NICU/Pediatrics) [phone]   [facility] [phone]   [facility] [phone]   [facility] [phone]   [facility] [phone]   [facility] [phone]   [facility] [phone]   [facility] [phone]   [facility]  [phone]   [facility] [phone]   [facility] [phone]   [facility] [phone]

## 2024-01-11 NOTE — ASSESSMENT & PLAN NOTE
"Patient received asa 300mg NV 1/9/24 followed by asa 81mg daily 1/10. Repeat MRI w/ contrast without evidence of stroke  Neurodiagnostics:   - MRI brain wo contrast 1/9/24:   \"Tiny focus of abnormal diffusion signal within the right basal ganglia involving the anterior limb of the internal capsule not clearly restricted on the ADC maps but suspicious for small acute lacunar infarct.\"   - MRI brain w/wo 1/10/24:  \" Previously visualized diffusion signal abnormality in the right basal ganglia is no longer identified and may have been artifactual given underlying susceptibility artifact in the bilateral globus pallidus. \"  Pertinent Labs  Lipid Panel: Total cholesterol 118, LDL 59  Hemoglobin A1c 6.6   Relevant inpatient meds:  Asa 300mg pr x1 1/9 followed by 81mg daily 1/10  Atorvastatin 20mg daily started 1/9  Recommendations:  - D/C Stroke pathway  No need to continue Aspirin 81 mg or Atorvastatin 20 mg daily from neurology standpoint  No need to repeat MRI in 4-6 weeks as previously recommended  Goal normotension, euglycemic  "

## 2024-01-11 NOTE — TELEPHONE ENCOUNTER
1/9 1:50    Pt's daughter left a  requesting a call back from Dr. Nino.    Transcribed VM:   Hi, this is Cathryn Hunt. I'm calling on behalf of my mother, Carla Hunt. Date of birth is [birth year]. Um I called earlier and spoke to someone and they said they put a message out to the care team, but I really need to speak to my mother's neurologist. Its very important. She is critical in the hospital. They think it could be seizures. They're not sure what's going on and I feel like they are not moving quickly enough at all. They're not taking her as a priority. And she has gotten progressively worse and she's been coming since she got to the hospital. If somebody could please call me back, I would really appreciate it. Thank you.    Dr Trung LUCERO. This message is from Tuesday and pt has since been transported to [facility] and is in the ICU, so I'm not sure that she still needs to talk to you, but just letting you know.

## 2024-01-11 NOTE — CONSULTS
[facility] Infectious Disease - Inpatient Consultation  Carla Hunt 58 y.o. female MRN: [id number]  Unit/Bed#: ICU 13 Encounter: [id number]    Reason for Consult / Principal Problem:     Encephalopathy, Covid-19 Positive    ASSESSMENT & PLAN:      Encephalopathy  -Patient presenting with worsening encephalopathy and abdominal pain, positive for COVID and with signs of pneumonia on imaging.   -1/7 CT head and neck: No acute arterial vascular abnormality in the head or neck. No flow-limiting large vessel arterial vascular stenosis in the head or neck. Tiny punctate foci of relative increased parenchymal density in the frontal lobes bilaterally, unchanged from 2 hours earlier and probably representing low-density parenchymal calcifications rather than parenchymal hemorrhage. Conservative management with an additional follow-up noncontrast head CT is recommended in 24 to 48 hours. Reidentified left temporal lobe resection. Dense basal ganglia and cerebellar calcifications again noted. Groundglass and consolidative airspace opacities most suspicious for extensive pneumonia seen throughout the visualized mid and upper lung zones more dense on the right than on the left.  -1/10 MRI Brain: Previously visualized diffusion signal abnormality in the right basal ganglia is no longer identified and may have been artifactual given underlying susceptibility artifact in the bilateral globus pallidus.   -1/10 CXR: 1. Persistence of bilateral hazy diffuse airspace opacities in the lungs, without significant change, again suspicious for pneumonia  2. Nasogastric tube extends below left hemidiaphragm.  -Currently bradycardic, requiring HFNC.  -Lactic Acid 0.9 -> 1.3   -Procal 0.66 -> 0.6 -> 0.37.  -WBC 2.26 -> 1.99 -> 2.91.  -1/7 BCX NGTD.  -Covid positive.  -1/7 Urine culture: Lactobacillus.  -1/9 BXR negative @ 24 hours.  -Strep/Legionella urine antigen negative.  -Currently on Acyclovir 650 mg Q8, day 2, Ampicillin 2G Q4, day  2, Ceftriaxone 2G Q12, day 3, Vancomycin day 3.  -On Remdesivir, day 3.  -On Decadron 6 mg IV daily.  -Has not yet had an LP at this time.    Plan:  -Unclear etiology of encephalopathy - consider seizures with epilepsy history vs infectious vs metabolic.  -Imaging findings consistent with pneumonia - on appropriate treatment for CAP.  -Covid pathway.  -Continue antibiotic regimen at this time.  -LP pending, though patient has been on antibiotics for several days.  -F/U cultures.  -Monitor VEEG results.  -Monitor vitals closely.    Chronic Kidney Disease  -Creatinine 1.23.  -Appears at baseline.    Covid-19 Positive  -See above A&P.    Hypernatremia  -Per primary team.    Epilepsy  -On Lamictal and Topamax.  -On VEEG.  -Per neurology and primary team.  ______________________________________________________________________    HISTORY OF PRESENT ILLNESS:  Patient is a 57 y/o female with PMH including CKD3, epilepsy, HLD, S/P L anterior temporal lobe resection in 2007, B-cell lymphoma on chemotherapy who presented with abdominal pain and encephalopathy. She also has a history of recent UTI approximately 2 weeks PTA for which she was prescribed Ciprofloxacin. Noted gradually worsening encephalopathy prior to admission. Found to be covid positive. We were consulted for anti-microbial management.    Patient seen and examined. She is able to reply to conversation however replies are often nonsensical. Occasionally answers questions - largely denies ROS complaints but limited ability to complete. Oriented x2 understanding she is in the hospital with prompting however very poor insight. Cannot answer questions about what brought her to the hospital.    REVIEW OF SYSTEMS:    Review of Systems   Constitutional:  Negative for fatigue and fever.        ROS limited by: not answering questions, poor insight.   HENT:  Negative for sore throat.    Eyes:  Negative for visual disturbance.   Respiratory:  Negative for cough and  shortness of breath.    Cardiovascular:  Negative for chest pain.   Gastrointestinal:  Negative for abdominal pain, nausea and vomiting.   Endocrine: Negative for polyuria.   Genitourinary:  Negative for dysuria.   Musculoskeletal:  Negative for back pain.   Neurological:  Negative for light-headedness and headaches.        Historical Information   Past Medical History:   Diagnosis Date    Hx of hypercalcemia     Lymphoma (HCC) 2021    Parathyroid disease (HCC)     Seizures (HCC)     Situational depression     24 yr old son  from drug overdose     Past Surgical History:   Procedure Laterality Date    CRANIOTOMY FOR TEMPORAL LOBECTOMY Left      Social History   Social History     Substance and Sexual Activity   Alcohol Use No     Social History     Substance and Sexual Activity   Drug Use Never     Social History     Tobacco Use   Smoking Status Never   Smokeless Tobacco Never     Family History   Problem Relation Age of Onset    Diabetes Mother     Cancer Father        Meds/Allergies   Medications Prior to Admission   Medication    busPIRone (BUSPAR) 5 mg tablet    escitalopram (LEXAPRO) 10 mg tablet    ibuprofen (MOTRIN) 600 mg tablet    lamoTRIgine (LaMICtal) 200 MG tablet    lamoTRIgine (LaMICtal) 200 MG tablet    medroxyPROGESTERone acetate (DEPO-PROVERA SYRINGE) 150 mg/mL injection    ondansetron (ZOFRAN-ODT) 4 mg disintegrating tablet    QUEtiapine (SEROquel) 50 mg tablet    tamsulosin (FLOMAX) 0.4 mg    topiramate (TOPAMAX) 100 mg tablet    topiramate (TOPAMAX) 100 mg tablet    venlafaxine (EFFEXOR-XR) 75 mg 24 hr capsule     Current Facility-Administered Medications   Medication Dose Route Frequency    acetaminophen (TYLENOL) tablet 650 mg  650 mg Oral Q6H PRN    acyclovir (ZOVIRAX) 650 mg in sodium chloride 0.9 % 100 mL IVPB  10 mg/kg (Ideal) Intravenous Q8H    ampicillin (OMNIPEN) 2,000 mg in sodium chloride 0.9 % 100 mL IVPB  2,000 mg Intravenous Q4H    atorvastatin (LIPITOR) tablet 20 mg  20  "mg Oral Daily With Dinner    bisacodyl (DULCOLAX) rectal suppository 10 mg  10 mg Rectal Daily PRN    cefTRIAXone (ROCEPHIN) 2,000 mg in dextrose 5 % 50 mL IVPB  2,000 mg Intravenous Q12H    chlorhexidine (PERIDEX) 0.12 % oral rinse 15 mL  15 mL Mouth/Throat Q12H SARTHAK    dexamethasone (PF) (DECADRON) injection 6 mg  6 mg Intravenous Daily    dexmedeTOMIDine (Precedex) 400 mcg in sodium chloride 0.9% 100 mL  0.1-1.5 mcg/kg/hr Intravenous Titrated    fentanyl citrate (PF) 100 MCG/2ML 25 mcg  25 mcg Intravenous Q10 Min PRN    lamoTRIgine (LaMICtal) tablet 200 mg  200 mg Oral BID    ondansetron (ZOFRAN) injection 4 mg  4 mg Intravenous Q6H PRN    polyethylene glycol (MIRALAX) packet 17 g  17 g Oral Daily    remdesivir (Veklury) 100 mg in sodium chloride 0.9 % 270 mL IVPB  100 mg Intravenous Q24H    senna-docusate sodium (SENOKOT S) 8.6-50 mg per tablet 2 tablet  2 tablet Oral BID    sodium chloride infusion 0.45 %  100 mL/hr Intravenous Continuous    topiramate (TOPAMAX) tablet 150 mg  150 mg Oral BID    vancomycin (VANCOCIN) 1,250 mg in sodium chloride 0.9 % 250 mL IVPB  1,250 mg Intravenous Q24H    venlafaxine (EFFEXOR-XR) 24 hr capsule 75 mg  75 mg Oral Daily     No Known Allergies    PHYSICAL EXAM:      Objective   Blood pressure 98/59, pulse (!) 50, temperature 99.6 °F (37.6 °C), temperature source Oral, resp. rate 18, height 5' 8\" (1.727 m), weight 77.2 kg (170 lb 3.1 oz), SpO2 96%. Body mass index is 25.88 kg/m².    Intake/Output Summary (Last 24 hours) at 1/11/2024 0937  Last data filed at 1/11/2024 0652  Gross per 24 hour   Intake 1888.3 ml   Output 1525 ml   Net 363.3 ml       Physical Exam  Constitutional:       Appearance: She is ill-appearing.   HENT:      Head: Normocephalic and atraumatic.      Right Ear: External ear normal.      Left Ear: External ear normal.      Nose: Nose normal.      Mouth/Throat:      Mouth: Mucous membranes are moist.   Eyes:      Extraocular Movements: Extraocular movements " intact.      Pupils: Pupils are equal, round, and reactive to light.   Cardiovascular:      Rate and Rhythm: Normal rate and regular rhythm.      Pulses: Normal pulses.      Heart sounds: Normal heart sounds.   Pulmonary:      Effort: Pulmonary effort is normal.      Breath sounds: Normal breath sounds.   Abdominal:      General: Abdomen is flat.      Palpations: Abdomen is soft.   Musculoskeletal:      Cervical back: No rigidity or tenderness.      Right lower leg: Edema present.      Left lower leg: Edema present.      Comments: Trace LE edema.   Skin:     General: Skin is warm.      Capillary Refill: Capillary refill takes less than 2 seconds.   Neurological:      General: No focal deficit present.      Mental Status: She is oriented to person, place, and time.      Comments: Oriented x2 with very poor insight, nonsensical answers to questions.         LAB RESULTS:     Admission on 01/10/2024   Component Date Value    Ventricular Rate 01/10/2024 83     Atrial Rate 01/10/2024 83     DE Interval 01/10/2024 167     QRSD Interval 01/10/2024 79     QT Interval 01/10/2024 358     QTC Interval 01/10/2024 421     P Axis 01/10/2024 61     QRS [place] 01/10/2024 49     T Wave [place] 01/10/2024 61     Ventricular Rate 01/10/2024 56     Atrial Rate 01/10/2024 56     DE Interval 01/10/2024 167     QRSD Interval 01/10/2024 79     QT Interval 01/10/2024 433     QTC Interval 01/10/2024 418     P Axis 01/10/2024 48     QRS Axis 01/10/2024 21     T Wave [place] 01/10/2024 30     Ventricular Rate 01/10/2024 56     Atrial Rate 01/10/2024 56     DE Interval 01/10/2024 163     QRSD Interval 01/10/2024 83     QT Interval 01/10/2024 433     QTC Interval 01/10/2024 418     P Axis 01/10/2024 53     QRS [place] 01/10/2024 32     T Wave [place] 01/10/2024 44     Sodium 01/11/2024 150 (H)     Potassium 01/11/2024 3.9     Chloride 01/11/2024 121 (H)     CO2 01/11/2024 18 (L)     ANION GAP 01/11/2024 11     BUN 01/11/2024 28 (H)     Creatinine 01/11/2024  1.26     Glucose 01/11/2024 168 (H)     Calcium 01/11/2024 9.0     eGFR 01/11/2024 47     WBC 01/11/2024 2.91 (L)     RBC 01/11/2024 4.18     Hemoglobin 01/11/2024 12.2     Hematocrit 01/11/2024 37.5     MCV 01/11/2024 90     MCH 01/11/2024 29.2     MCHC 01/11/2024 32.5     RDW 01/11/2024 13.8     MPV 01/11/2024 9.7     Platelets 01/11/2024 332     Procalcitonin 01/11/2024 0.37 (H)     CRP 01/11/2024 76.6 (H)     Vancomycin Rm 01/11/2024 28.7 (H)     Sodium 01/11/2024 152 (H)     Potassium 01/11/2024 3.9     Chloride 01/11/2024 120 (H)     CO2 01/11/2024 20 (L)     ANION GAP 01/11/2024 12     BUN 01/11/2024 26 (H)     Creatinine 01/11/2024 1.23     Glucose 01/11/2024 185 (H)     Calcium 01/11/2024 9.0     Corrected Calcium 01/11/2024 9.5     AST 01/11/2024 97 (H)     ALT 01/11/2024 77 (H)     Alkaline Phosphatase 01/11/2024 92     Total Protein 01/11/2024 5.8 (L)     Albumin 01/11/2024 3.4 (L)     Total Bilirubin 01/11/2024 0.53     eGFR 01/11/2024 48     LACTIC ACID 01/11/2024 1.3     Magnesium 01/11/2024 2.1     Phosphorus 01/11/2024 2.8     pH, Arterial 01/11/2024 7.350     pCO2, Arterial 01/11/2024 32.6 (L)     pO2, Arterial 01/11/2024 76.7     HCO3, Arterial 01/11/2024 17.6 (L)     Base Excess, Arterial 01/11/2024 -7.0     O2 Content, Arterial 01/11/2024 17.1     O2 HGB,Arterial  01/11/2024 94.0     Segmented % 01/11/2024 88 (H)     Bands % 01/11/2024 7     Lymphocytes % 01/11/2024 5 (L)     Monocytes % 01/11/2024 0 (L)     Eosinophils, % 01/11/2024 0     Basophils % 01/11/2024 0     Absolute Neutrophils 01/11/2024 2.76     Lymphocytes Absolute 01/11/2024 0.15 (L)     Monocytes Absolute 01/11/2024 0.00     Eosinophils Absolute 01/11/2024 0.00     Basophils Absolute 01/11/2024 0.00     RBC Morphology 01/11/2024 Present     Platelet Estimate 01/11/2024 Adequate     Andrea Cells 01/11/2024 Present     Ovalocytes 01/11/2024 Present     Poikilocytes 01/11/2024 Present        RADIOLOGY RESULTS: I have personally  reviewed pertinent imaging studies.    Gabe Mcgovern MD  [facility]  Internal Medicine Residency PGY-2

## 2024-01-11 NOTE — PROGRESS NOTES
[facility]  Progress Note: Critical Care  Name: Carla Hunt 58 y.o. female I MRN: [id number]  Unit/Bed#: ICU 13 I Date of Admission: 1/10/2024   Date of Service: 1/11/2024 I Hospital Day: 1    Assessment/Plan   Neuro:   Diagnosis: Acute encephalopathy, history of epilepsy, stroke-like symptoms  Plan: Frequent neurologic monitoring, continue Precedex for patient comfort/diagnositic work-up, follow results of vEEG, needs LP 24 hours after last Lovenox dose (1/10 @ 0900), continue Lamictal/Topamax- awaiting drug levels, continue Effexor, appreciate neurology recommendations  CV:   Diagnosis: Hyperlipidemia  Plan: Conitnue home statin, given lack of infarct on most recent MRI consider discontinuation of aspirin  Pulm:  Diagnosis: Acute hypoxic respiratory failure  Plan: Continue oxygen supplementation for goal SpO2>88%, respiratory protocol, steroids per COVID protocol  GI:   Plan: Continue bowel regimen, would begin PPI while NPO  :   Diagnosis: CKD 3  Plan: Appears to be developing mild acidosis with decreasing CO2 on overnight chemistry, will transition to 0.45 NS in care contribution from hyperchloremia, follow AM labs, close intake and output, daily weights, trend serum chemistries  F/E/N:   Diagnosis: Hypernatremia  Plan: Continue 0.45 NS, consider initiation of enteral nutrition, replete electrolytes as ordered  Heme/Onc:   Diagnosis: History of B-cell lymphoma  Plan: Holding anticoagulation, would resume treatment Lovenox post-lumbar puncture  Endo:   Plan: Follow blood glucose q6 while NPO  ID:   Diagnosis: Severe sepsis, COVID-19 pneumonia  Plan: Day 3 of antimicrobirals, presently on ceftriaxone/ampicillin/vancomycin with remdesivir/acyclovir, received Actemra on 1/9, follow temperature/white count/culture results, trend procalcitonin, awaiting LP, pending infectious disease consult  MSK/Skin:   Plan: Frequent turning and repositioning    Disposition: Critical  care    ICU Core Measures     A: Assess, Prevent, and Manage Pain Has pain been assessed? Yes  Need for changes to pain regimen? No   B: Both SAT/SAT  N/A   C: Choice of Sedation RASS Goal: 0 Alert and Calm  Need for changes to sedation or analgesia regimen? No   D: Delirium CAM-ICU: Unable to perform secondary to Acute cognitive dysfunction   E: Early Mobility  Plan for early mobility? Yes   F: Family Engagement Plan for family engagement today? Yes       Antibiotic Review: Infectious disease consulted    Review of Invasive Devices:    Ortiz Plan: Continue for accurate I/O monitoring for 48 hours        Prophylaxis:  VTE Contraindicated secondary to: pending LP   Stress Ulcer  not ordered        Significant 24hr Events     24hr events: Patient underwent MRI that did not re-demosntrate possible infarction seen earlier, maintained on Precedex overnight, progressed to requiring high flow nasal cannula, IV fluids transitioned to 0.45 NS, connected to vEEG, LP to be performed this morning 24 hours after last dose of Lovenox, pending ID consult     Subjective     Review of Systems   Unable to perform ROS: Mental status change        Objective                            Vitals I/O      Most Recent Min/Max in 24hrs   Temp 97.6 °F (36.4 °C) Temp  Min: 96.6 °F (35.9 °C)  Max: 98.4 °F (36.9 °C)   Pulse 58 Pulse  Min: 54  Max: 112   Resp 15 Resp  Min: 15  Max: 41   /81 BP  Min: 100/56  Max: 152/77   O2 Sat 99 % SpO2  Min: 90 %  Max: 100 %      Intake/Output Summary (Last 24 hours) at 1/11/2024 0209  Last data filed at 1/11/2024 0030  Gross per 24 hour   Intake 789.3 ml   Output --   Net 789.3 ml       Diet NPO    Invasive Monitoring           Physical Exam   Physical Exam  Eyes:      Pupils: Pupils are equal, round, and reactive to light.   Skin:     General: Skin is warm and dry.   HENT:      Head: Normocephalic and atraumatic.      Mouth/Throat:      Mouth: Mucous membranes are dry.   Cardiovascular:      Rate and  Rhythm: Normal rate and regular rhythm.      Pulses: Normal pulses.   Musculoskeletal:         General: No deformity or signs of injury.      Right lower leg: Trace Edema present.      Left lower leg: Trace Edema present.   Abdominal: General: There is no distension.      Palpations: Abdomen is soft.   Constitutional:       Appearance: She is toxic-appearing.      Interventions: She is restrained. Nasal cannula in place.   Pulmonary:      Effort: Pulmonary effort is normal. No respiratory distress.      Breath sounds: Normal breath sounds.   Neurological:      GCS: GCS eye subscore is 1. GCS verbal subscore is 2. GCS motor subscore is 3.      Comments: Flexion in all extremities to noxious stimuli   Genitourinary/Anorectal:     Comments: Ortiz in place with yellow urine  Ortiz present.          Diagnostic Studies      EKG: Sinus rhythm  Imaging:  I have personally reviewed pertinent reports.   and I have personally reviewed pertinent films in PACS     Medications:  Scheduled PRN   acyclovir, 10 mg/kg (Ideal), Q8H  ampicillin, 2,000 mg, Q4H  aspirin, 81 mg, Daily  atorvastatin, 20 mg, Daily With Dinner  cefTRIAXone, 2,000 mg, Q12H  chlorhexidine, 15 mL, Q12H SARTHAK  dexamethasone, 6 mg, Daily  lamoTRIgine, 200 mg, BID  polyethylene glycol, 17 g, Daily  potassium chloride, 20 mEq, Once  remdesivir, 100 mg, Q24H  senna-docusate sodium, 2 tablet, BID  topiramate, 150 mg, BID  vancomycin, 1,500 mg, Q24H  venlafaxine, 75 mg, Daily      acetaminophen, 650 mg, Q6H PRN  bisacodyl, 10 mg, Daily PRN  fentanyl citrate (PF), 25 mcg, Q10 Min PRN  ondansetron, 4 mg, Q6H PRN       Continuous    dexmedetomidine, 0.1-1.5 mcg/kg/hr, Last Rate: 1.5 mcg/kg/hr (01/11/24 0123)  sodium chloride, 100 mL/hr         Labs:    CBC    Recent Labs     01/09/24  1926 01/10/24  0441   WBC 2.26* 1.99*   HGB 11.4* 11.9   HCT 35.0 36.0    293     BMP    Recent Labs     01/10/24  1809 01/11/24  0023   SODIUM 149* 150*   K 4.0 3.9   * 121*    CO2 20* 18*   AGAP 7 11   BUN 28* 28*   CREATININE 1.30 1.26   CALCIUM 9.2 9.0       Coags    No recent results     Additional Electrolytes  Recent Labs     01/09/24  0342 01/10/24  0441   MG 2.0 2.2   PHOS  --  2.8          Blood Gas    No recent results  No recent results LFTs  Recent Labs     01/09/24  1926 01/10/24  0441   ALT 89* 87*   * 125*   ALKPHOS 99 97   ALB 3.3* 3.3*   TBILI 0.70 0.59       Infectious  Recent Labs     01/09/24  1926 01/10/24  0441   PROCALCITONI 0.66* 0.60*     Glucose  Recent Labs     01/10/24  1030 01/10/24  1219 01/10/24  1809 01/11/24  0023   GLUC 116 136 155* 168*           DANYELLE Moss

## 2024-01-11 NOTE — OCCUPATIONAL THERAPY NOTE
Occupational Therapy Cancel Note        Patient Name: Carla Hunt  Today's Date: 1/11/2024 01/11/24 1156   OT Last Visit   OT Visit Date 01/11/24   Note Type   Note type Evaluation;Cancelled Session   Cancel Reasons Medical status     OT orders were received and chart reviewed. Spoke with RN who reports pt is not medically appropriate for therapy at this time. OT will hold and continue to follow and see as medically appropriate and able.     Tish Ramos, OTR/L

## 2024-01-11 NOTE — PROGRESS NOTES
Vancomycin IV Pharmacy-to-Dose Consultation    Carla Hunt is a 58 y.o. female who is currently receiving Vancomycin IV with management by the Pharmacy Consult service.    Relevant clinical data and objective / subjective history reviewed.      Vancomycin Assessment:  Indication: CNS infection (goal -600, trough 15-20)    Status: critically ill  Micro:   - 1/7 Urine: Lactobacillus  Procalcitonin: 1/11 on 0.37  Renal Function:     Lab Results   Component Value Date    CREATININE 1.23 01/11/2024     Estimated Creatinine Clearance: 55.1 mL/min (by C-G formula based on SCr of 1.23 mg/dL).  Dialysis: no  Days of Therapy: 3  Current Dose: 1250 mg IV q24h   Goal AUC / Trough: -600, trough 15-20   Last Level: 28.7 on 1/11  Assessment: WBC low, afebrile.       Vancomycin Plan:  New Dosing: continue current regimen   Predicted Trough / AUC: 15.2 / 550  Next Level: on 1/18 at 0600  Renal Function Monitoring: Daily BMP and UOP      Pharmacy will continue to follow closely for s/sx of nephrotoxicity, infusion reactions and appropriateness of therapy.  BMP and CBC will be ordered per protocol. We will continue to follow the patient’s culture results and clinical progress daily.       Gabe Henriquez, PharmD, BCCCP  Critical Care Clinical Pharmacist  Available via [employer]

## 2024-01-11 NOTE — PROGRESS NOTES
"Progress Note - Neurology   Carla Hunt 58 y.o. female [id number]  Unit/Bed#: ICU 13/ICU 13    Assessment/Plan:  Carla Hunt is a 58 y.o. female    Localization-related symptomatic epilepsy and epileptic syndromes with complex partial seizures, intractable, without status epilepticus (HCC)  Assessment & Plan  58 y.o. 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 1/7/24 with 6 day history of refusing to eat, trouble finding words, slurred speech, AMS and unsteady gait. Pt found to have COVID infection and ELIESER in ED. CTH with bifrontal hyperdensities concerning for hemorrhage vs calcifications    1/8 neurology consulted with concern for focal seizures in setting of covid infection and worsening renal function.     1/9 loaded with Vimpat 400 mg IV x 1 and Topiramate increased to 150 mg twice daily.     1/10: Continued to be encephalopathic with periods of agitation/combativeness. Afebrile since 1/9 afternoon. No overt seizure-like activity noted by family or staff. On high-flow oxygen, remdesivir and steroids for underlying COVID. Renal function improved. MRI brain w/wo without evidence of stroke. Patient transferred to [facility] neuro ICU for ongoing management/video EEG monitoring    1/11: Remains encephalopathy with Tmax 99.9/24hrs. Continues on high flow oxygen. Na trending up 152 at present, ivf changed to 0.45 % NS. Video EEG prelim report per epileptologist: \"diffuse slowing, focal slowing over left temporal region. No seizures\"     Neurodiagnostics:   - CTA H/N wwo contrast 1/7/24:   \"No acute arterial vascular abnormality in the head or neck. No flow-limiting large vessel arterial vascular " "stenosis in the head or neck. Tiny punctate foci of relative increased parenchymal density in the frontal lobes bilaterally, unchanged from 2 hours earlier and probably representing low-density parenchymal calcifications rather than parenchymal hemorrhage. Conservative management with an additional follow-up noncontrast head CT is recommended in 24 to 48 hours. Reidentified left temporal lobe resection. Dense basal ganglia and cerebellar calcifications again noted. Groundglass and consolidative airspace opacities most suspicious for extensive pneumonia seen throughout the visualized mid and upper lung zones more dense on the right than on the left.\"  - CTH wo contrast 1/7/24:   \"Multifocal pneumonia and/or edema.\"  - routine EEG 1/8/24:   \"This is an abnormal 30 minutes awake, drowsy, and asleep EEG due to disorganized theta-delta activity. Background activities in the delta/theta range are suggestive of a moderate diffuse cerebral dysfunction. The lack of epileptiform discharges on a routine EEG does not preclude the diagnosis of seizures or epilepsy. \"  - MRI brain wo 1/9/24:  \"Tiny focus of abnormal diffusion signal within the right basal ganglia involving the anterior limb of the internal capsule not clearly restricted on the ADC maps but suspicious for small acute lacunar infarct.\"   - MRI brain seizure wo and w contrast 1/10/24:  \"Previously visualized diffusion signal abnormality in the right basal ganglia is no longer identified and may have been artifactual given underlying susceptibility artifact in the bilateral globus pallidus.\"  Relevant home meds:  Lamictal 200mg bid  Topiramate 100mg bid  Effexor 75mg daily  Relevant inpatient meds:  Vimpat 200mg bid started 1/8 (no dose 1/8 pm due to npo); 400mg iv x1 1/9   Topiramate 100mg bid started 1/8 (no dose 1/8 pm due to npo), increased to 150mg bid 1/9  Ativan 1mg iv 1/8 x1, 1/9 x3  Haldol 2mg iv x1 1/9  Effexor 75mg daily  Acyclovir 650mg iv q8 hr started " "1/10  Ampicillin 2gm iv q4 hr started 1/10  Rocephin 1gm iv x2 -10, followed by  2gm iv q12 hr1/10 pm  Vancomycin 1250mg iv dialy started ; increased to 1500mg daily 1/10->d/c (supra therapeutic)  Remdesivir started   Actrema x1   Decadron 6mg iv daily started   Precedex gtt started 1/10  Lovenox 1mg/kg q12hr (started pm; last dose 1/10 ~9am)  Pertinent labs:  Na 152 (150); 136 on admission    Creat 1.2; 1.5 on admission  AST 97(125), ALT 77(87)  AB.35/32.6/76.7/17.6  CRP 76.6  PCT 0.37  Lactate, ammonia WNL  Recommendations:  - Continue video EEG monitoring  - De-escalate AEDs to home dose of topiramate 100mg BID and lamotrigine 200mg BID. Lamotrigine and topiramate levels sent 24; pending   - Recommend LP when able; ID consult pending. CSF labs recommended: RBC, WBC, protein, glucose, ME panel, cryptococcal, flow cytometry, cytology  - Administer Ativan prn seizure-like activity    - Maintain telemetry  - Seizure precautions   - Continue to monitor and notify Neurology with any changes.  - Medical management and correction of any metabolic or infectious disturbances per ICU    Stroke-like symptoms  Assessment & Plan  Patient received asa 300mg CO 24 followed by asa 81mg daily 1/10. Repeat MRI w/ contrast without evidence of stroke  Neurodiagnostics:   - MRI brain wo contrast 24:   \"Tiny focus of abnormal diffusion signal within the right basal ganglia involving the anterior limb of the internal capsule not clearly restricted on the ADC maps but suspicious for small acute lacunar infarct.\"   - MRI brain w/wo 1/10/24:  \" Previously visualized diffusion signal abnormality in the right basal ganglia is no longer identified and may have been artifactual given underlying susceptibility artifact in the bilateral globus pallidus. \"  Pertinent Labs  Lipid Panel: Total cholesterol 118, LDL 59  Hemoglobin A1c 6.6   Relevant inpatient meds:  Asa 300mg pr x1  followed by 81mg daily " 1/10  Atorvastatin 20mg daily started   Recommendations:  - D/C Stroke pathway  No need to continue Aspirin 81 mg or Atorvastatin 20 mg daily from neurology standpoint  No need to repeat MRI in 4-6 weeks as previously recommended  Goal normotension, euglycemic    Encephalopathy acute  Assessment & Plan  Multifactorial  See plan above      Please refer to attending attestation for additional recommendations.    Recommendations for outpatient neurological follow up have yet to be determined.    Subjective:   Patient remains encephalopathy    Past Medical History:   Diagnosis Date    Hx of hypercalcemia     Lymphoma (HCC) 2021    Parathyroid disease (HCC)     Seizures (HCC)     Situational depression     24 yr old son  from drug overdose     Past Surgical History:   Procedure Laterality Date    CRANIOTOMY FOR TEMPORAL LOBECTOMY Left 2007     Family History   Problem Relation Age of Onset    Diabetes Mother     Cancer Father      Social History     Socioeconomic History    Marital status: /Civil Union     Spouse name: Not on file    Number of children: Not on file    Years of education: Not on file    Highest education level: Not on file   Occupational History    Not on file   Tobacco Use    Smoking status: Never    Smokeless tobacco: Never   Vaping Use    Vaping status: Never Used   Substance and Sexual Activity    Alcohol use: No    Drug use: Never    Sexual activity: Not on file   Other Topics Concern    Not on file   Social History Narrative    Not on file     Social Determinants of Health     Financial Resource Strain: Not on file   Food Insecurity: No Food Insecurity (2024)    Hunger Vital Sign     Worried About Running Out of Food in the Last Year: Never true     Ran Out of Food in the Last Year: Never true   Transportation Needs: No Transportation Needs (2024)    PRAPARE - Transportation     Lack of Transportation (Medical): No     Lack of Transportation (Non-Medical): No   Physical  Activity: Not on file   Stress: Not on file   Social Connections: Not on file   Intimate Partner Violence: Not on file   Housing Stability: Low Risk  (1/11/2024)    Housing Stability Vital Sign     Unable to Pay for Housing in the Last Year: No     Number of Places Lived in the Last Year: 1     Unstable Housing in the Last Year: No     E-Cigarette/Vaping    E-Cigarette Use Never User      E-Cigarette/Vaping Substances    Nicotine No     THC No     CBD No      Medications:  All current active meds have been reviewed and current meds:  Scheduled Meds:  Current Facility-Administered Medications   Medication Dose Route Frequency Provider Last Rate    acetaminophen  650 mg Oral Q6H PRN DANYELLE Moss      [START ON 1/12/2024] acyclovir  10 mg/kg (Ideal) Intravenous Q12H Gabe Mcgovern MD      ampicillin  2,000 mg Intravenous Q4H DANYELLE Moss 2,000 mg (01/11/24 1406)    atorvastatin  20 mg Oral Daily With Dinner DANYELLE Moss      bisacodyl  10 mg Rectal Daily PRN DANYELLE Moss      cefTRIAXone  2,000 mg Intravenous Q12H DANYELLE Moss Stopped (01/11/24 0650)    chlorhexidine  15 mL Mouth/Throat Q12H SARTHAK DANYELLE Moss      dexamethasone  6 mg Intravenous Daily DANYELLE Moss      dexmedetomidine  0.1-1.5 mcg/kg/hr Intravenous Titrated DANYELLE Moss 0.4 mcg/kg/hr (01/11/24 0630)    fentanyl citrate (PF)  25 mcg Intravenous Q10 Min PRN DANYELLE Moss      lamoTRIgine  200 mg Oral BID DANYELLE Moss      ondansetron  4 mg Intravenous Q6H PRN DANYELLE Moss      polyethylene glycol  17 g Oral Daily DANYELLE Moss      remdesivir  100 mg Intravenous Q24H DANYELLE Moss      senna-docusate sodium  2 tablet Oral BID DANYELLE Moss      sodium chloride  100 mL/hr Intravenous Continuous DANYELLE Moss 100 mL/hr (01/11/24 1444)    topiramate  150 mg Oral BID DANYELLE Moss      vancomycin   "1,250 mg Intravenous Q24H Zabrina Calle DO      venlafaxine  25 mg Oral TID With Meals Erwin Ramos, DO       Continuous Infusions:dexmedetomidine, 0.1-1.5 mcg/kg/hr, Last Rate: 0.4 mcg/kg/hr (01/11/24 0630)  sodium chloride, 100 mL/hr, Last Rate: 100 mL/hr (01/11/24 1444)      PRN Meds:.  acetaminophen    bisacodyl    fentanyl citrate (PF)    ondansetron     ROS:   Review of Systems   Unable to perform ROS: Mental status change     Vitals:   /70   Pulse (!) 50   Temp (!) 97 °F (36.1 °C) (Axillary)   Resp 21   Ht 5' 8\" (1.727 m)   Wt 77.2 kg (170 lb 3.1 oz)   SpO2 94%   BMI 25.88 kg/m²     Physical Exam:   Physical Exam  Constitutional:       General: She is not in acute distress.     Appearance: She is ill-appearing.      Comments: Restless, opens eyes to voice   HENT:      Head: Normocephalic and atraumatic.   Pulmonary:      Comments: On high flow oxygen  Skin:     General: Skin is warm and dry.   Psychiatric:         Speech: Speech is slurred.       Neurologic Exam     Mental Status   Attention: decreased. Concentration: decreased.   Speech: slurred   Exam limited secondary to encephalopathy  Restless, observed to move all extremities without obvious focal motor deficits  Opens eyes to voice, PERRL with conjugate gaze, does not track.  Face is symmetric, tongue midline.  Limited speech is dysarthric  Unable to state name, place, month or repeat phrase  Occasionally follows simple commands; ie: open eyes/stick out tongue/thumbs up.  No overt tremors or seizure like activity observed       Labs: I have personally reviewed pertinent reports.   Recent Results (from the past 24 hour(s))   Basic metabolic panel    Collection Time: 01/10/24  6:09 PM   Result Value Ref Range    Sodium 149 (H) 135 - 147 mmol/L    Potassium 4.0 3.5 - 5.3 mmol/L    Chloride 122 (H) 96 - 108 mmol/L    CO2 20 (L) 21 - 32 mmol/L    ANION GAP 7 mmol/L    BUN 28 (H) 5 - 25 mg/dL    Creatinine 1.30 0.60 - 1.30 mg/dL    " Glucose 155 (H) 65 - 140 mg/dL    Calcium 9.2 8.4 - 10.2 mg/dL    eGFR 45 ml/min/1.73sq m   ECG 12 lead    Collection Time: 01/10/24  8:03 PM   Result Value Ref Range    Ventricular Rate 83 BPM    Atrial Rate 83 BPM    VT Interval 167 ms    QRSD Interval 79 ms    QT Interval 358 ms    QTC Interval 421 ms    P Axis 61 degrees    QRS Axis 49 degrees    T Wave Axis 61 degrees   ECG 12 lead    Collection Time: 01/10/24  8:32 PM   Result Value Ref Range    Ventricular Rate 56 BPM    Atrial Rate 56 BPM    VT Interval 167 ms    QRSD Interval 79 ms    QT Interval 433 ms    QTC Interval 418 ms    P Metter 48 degrees    QRS Axis 21 degrees    T Wave Axis 30 degrees   ECG 12 lead    Collection Time: 01/10/24  8:37 PM   Result Value Ref Range    Ventricular Rate 56 BPM    Atrial Rate 56 BPM    VT Interval 163 ms    QRSD Interval 83 ms    QT Interval 433 ms    QTC Interval 418 ms    P Metter 53 degrees    QRS Axis 32 degrees    T Wave Axis 44 degrees   Legionella antigen, urine    Collection Time: 01/10/24  9:15 PM    Specimen: Urine, Catheter   Result Value Ref Range    Legionella Urinary Antigen Negative Negative   Strep Pneumoniae, Urine    Collection Time: 01/10/24  9:15 PM    Specimen: Urine, Catheter   Result Value Ref Range    Strep pneumoniae antigen, urine Negative Negative   Basic metabolic panel    Collection Time: 01/11/24 12:23 AM   Result Value Ref Range    Sodium 150 (H) 135 - 147 mmol/L    Potassium 3.9 3.5 - 5.3 mmol/L    Chloride 121 (H) 96 - 108 mmol/L    CO2 18 (L) 21 - 32 mmol/L    ANION GAP 11 mmol/L    BUN 28 (H) 5 - 25 mg/dL    Creatinine 1.26 0.60 - 1.30 mg/dL    Glucose 168 (H) 65 - 140 mg/dL    Calcium 9.0 8.4 - 10.2 mg/dL    eGFR 47 ml/min/1.73sq m   CBC and differential    Collection Time: 01/11/24  4:54 AM   Result Value Ref Range    WBC 2.91 (L) 4.31 - 10.16 Thousand/uL    RBC 4.18 3.81 - 5.12 Million/uL    Hemoglobin 12.2 11.5 - 15.4 g/dL    Hematocrit 37.5 34.8 - 46.1 %    MCV 90 82 - 98 fL    MCH  29.2 26.8 - 34.3 pg    MCHC 32.5 31.4 - 37.4 g/dL    RDW 13.8 11.6 - 15.1 %    MPV 9.7 8.9 - 12.7 fL    Platelets 332 149 - 390 Thousands/uL   C-reactive protein    Collection Time: 01/11/24  4:54 AM   Result Value Ref Range    CRP 76.6 (H) <3.0 mg/L   Vancomycin, random    Collection Time: 01/11/24  4:54 AM   Result Value Ref Range    Vancomycin Rm 28.7 (H) 10.0 - 20.0 ug/mL   Comprehensive metabolic panel    Collection Time: 01/11/24  4:54 AM   Result Value Ref Range    Sodium 152 (H) 135 - 147 mmol/L    Potassium 3.9 3.5 - 5.3 mmol/L    Chloride 120 (H) 96 - 108 mmol/L    CO2 20 (L) 21 - 32 mmol/L    ANION GAP 12 mmol/L    BUN 26 (H) 5 - 25 mg/dL    Creatinine 1.23 0.60 - 1.30 mg/dL    Glucose 185 (H) 65 - 140 mg/dL    Calcium 9.0 8.4 - 10.2 mg/dL    Corrected Calcium 9.5 8.3 - 10.1 mg/dL    AST 97 (H) 13 - 39 U/L    ALT 77 (H) 7 - 52 U/L    Alkaline Phosphatase 92 34 - 104 U/L    Total Protein 5.8 (L) 6.4 - 8.4 g/dL    Albumin 3.4 (L) 3.5 - 5.0 g/dL    Total Bilirubin 0.53 0.20 - 1.00 mg/dL    eGFR 48 ml/min/1.73sq m   Lactic acid, plasma (w/reflex if result > 2.0)    Collection Time: 01/11/24  4:54 AM   Result Value Ref Range    LACTIC ACID 1.3 0.5 - 2.0 mmol/L   Magnesium    Collection Time: 01/11/24  4:54 AM   Result Value Ref Range    Magnesium 2.1 1.9 - 2.7 mg/dL   Phosphorus    Collection Time: 01/11/24  4:54 AM   Result Value Ref Range    Phosphorus 2.8 2.7 - 4.5 mg/dL   Manual Differential(PHLEBS Do Not Order)    Collection Time: 01/11/24  4:54 AM   Result Value Ref Range    Segmented % 88 (H) 43 - 75 %    Bands % 7 0 - 8 %    Lymphocytes % 5 (L) 14 - 44 %    Monocytes % 0 (L) 4 - 12 %    Eosinophils, % 0 0 - 6 %    Basophils % 0 0 - 1 %    Absolute Neutrophils 2.76 1.85 - 7.62 Thousand/uL    Lymphocytes Absolute 0.15 (L) 0.60 - 4.47 Thousand/uL    Monocytes Absolute 0.00 0.00 - 1.22 Thousand/uL    Eosinophils Absolute 0.00 0.00 - 0.40 Thousand/uL    Basophils Absolute 0.00 0.00 - 0.10 Thousand/uL     Total Counted      RBC Morphology Present     Platelet Estimate Adequate Adequate    [place] Cells Present     Ovalocytes Present     Poikilocytes Present    Blood gas, arterial    Collection Time: 01/11/24  5:12 AM   Result Value Ref Range    pH, Arterial 7.350 7.350 - 7.450    pCO2, Arterial 32.6 (L) 36.0 - 44.0 mm Hg    pO2, Arterial 76.7 75.0 - 129.0 mm Hg    HCO3, Arterial 17.6 (L) 22.0 - 28.0 mmol/L    Base Excess, Arterial -7.0 mmol/L    O2 Content, Arterial 17.1 16.0 - 23.0 mL/dL    O2 HGB,Arterial  94.0 94.0 - 97.0 %   Procalcitonin    Collection Time: 01/11/24  6:11 AM   Result Value Ref Range    Procalcitonin 0.37 (H) <=0.25 ng/ml   Protime-INR    Collection Time: 01/11/24  3:19 PM   Result Value Ref Range    Protime 16.3 (H) 11.6 - 14.5 seconds    INR 1.32 (H) 0.84 - 1.19       Imaging: I have personally reviewed pertinent imaging in PACS and I have personally reviewed PACS reports.     EKG, Pathology, and Other Studies: I have personally reviewed pertinent reports.     Counseling / Coordination of Care  Assessment, images, and plan reviewed with Dr. Garcia. Plan discussed with patient and ICU team

## 2024-01-11 NOTE — PHYSICAL THERAPY NOTE
Physical Therapy Cancellation Note         01/11/24 1155   PT Last Visit   PT Visit Date 01/11/24   Note Type   Note type Evaluation;Cancelled Session   Cancel Reasons Medical status     ORDERS FOR PT EVALUATION RECEIVED AND CHART REVIEW COMPLETED. PT NOT APPROPRIATE FOR PARTICIPATION IN PT AT THIS TIME 2* MEDICAL STATUS. SPOKE W/ RN VERÓNICA RE; CHELO WHO RECOMMENDS DEFERRING PT EVAL UNTIL LATER TIME.  WILL CONTINUE TO FOLLOW AND COMPLETE PT EVALUATION AS MEDICAL STATUS DICTATES.   Moon Ventura, PT

## 2024-01-11 NOTE — H&P
[facility]  H&P: Critical Care  Name: Carla Hunt 58 y.o. female I MRN: [id number]  Unit/Bed#: ICU 13 I Date of Admission: 1/10/2024   Date of Service: 1/10/2024 I Hospital Day: 0      Assessment/Plan   Neuro:   Diagnosis: Acute encephalopathy, history of epilepsy, stroke-like symptoms  Plan: Frequent neurologic monitoring, avoid sedating medications as able, plan for vEEG, will discuss sedation options to obtain MRI safely, can continue Precedex for now, continue Lamictal/Topamax, awaiting drug-levels, can continue Effexor for now, appreciate neurology recommendations  CV:   Diagnosis: Hyperlipidemia  Plan: Can continue home statin, may need to hold aspirin pending lumbar puncture decision  Pulm:  Diagnosis: Acute hypoxic respiratory failure  Plan: Continue oxygen supplementation for goal SpO2>88%, respiratory protocol, likely unable to prone due to encephalopathy, continue steroids per COVID protocol  GI:   Plan: Continue bowel regimen, would begin PPI while NPO  :   Plan: Close intake and output, daily weights, trend serum creatinine    F/E/N:    Diagnosis: Hypernatremia  Plan: Unclear etiology, question volume depletion, will start isolyte for now, trend q6 while critically ill, NPO for now, replete electrolytes as needed  Heme/Onc:   Diagnosis: B-cell lymphoma  Plan: On maintenance chemotherapy as an outpatient, patient was on therapeutic Lovenox at [facility] for COVID, hold anticoagulation pending LP  Endo:   Plan: Follow blood glucose q6 while NPO  ID:   Diagnosis: Severe sepsis, COVID-19 pneumonia  Plan: Day 2 of antimicrobials, presently on ceftriaxone/ampicillin/vancomycin and acyclovir with remdiesivir, patient also received Actemra on 1/9, follow temperature/white count/culture results, trend procalcitonin, LP results may be sterilized, would consult ID for assistance with management  MSK/Skin:   Plan: Frequent turning and repositioning    Disposition:  Critical care       History of Present Illness     HPI: Carla Hunt is a 58 y.o. who presents on  with a complaint of abdominal pain. She has a past medical history of chronic kidney disease stage 3 with baseline creatinine 1-1.3, epilepsy on Topamax and Lamictal, hyperlipidemia, left anterior temproal lobe resection in , a history of B-cell lymphoma on chemotherapy, and recent treatment outpatient for a urinary tract infection. In the emergency room there she was found to be COVID positive with symptoms concerning for stroke. Her initial CT showed a possible subarachnoid however given the size felt okay to be managed at the [facility]. Her neurologic work-up was notable for a possible lacunar infarct and had a negative routine EEG. Despite treatment for both COVID and possible seizures, the patient did not havei mprovement in mental status and worsening respiratory status. She was started on COVID protocol treatments, receiving Decadron/full-strength Lovenox/Actemra on . She was additionally started on remdesivir. After discussion with neurology, there was high clinical suspicion for intracranial infection  and a lumbar puncture was recommended. She was also started on antibiotics for possible meningitis. Burton mental status continued to worsen and she was referred to the [facility] for video EEG.    History obtained from chart review and unobtainable from patient due to mental status.  Review of Systems   Unable to perform ROS: Mental status change      Historical Information   Past Medical History:  No date: Hx of hypercalcemia  2021: Lymphoma (HCC)  No date: Parathyroid disease (HCC)  No date: Seizures (HCC)  No date: Situational depression      Comment:  24 yr old son  from drug overdose Past Surgical History:  2007: CRANIOTOMY FOR TEMPORAL LOBECTOMY; Left   Current Outpatient Medications   Medication Instructions    busPIRone (BUSPAR) 5 mg, Oral, 2 times daily    escitalopram  (LEXAPRO) 10 mg tablet No dose, route, or frequency recorded.    ibuprofen (MOTRIN) 600 mg, Oral, Every 6 hours PRN    lamoTRIgine (LaMICtal) 200 MG tablet 1 tablet by mouth twice per day.    lamoTRIgine (LAMICTAL) 200 mg, Oral, 2 times daily    medroxyPROGESTERone acetate (DEPO-PROVERA SYRINGE) 150 mg, Intramuscular, Every 3 months    ondansetron (ZOFRAN-ODT) 4 mg, Oral, Every 8 hours PRN    QUEtiapine (SEROQUEL) 50 mg, Daily at bedtime    tamsulosin (FLOMAX) 0.4 mg, Oral, Daily with dinner    topiramate (TOPAMAX) 100 mg, Oral, 2 times daily    topiramate (TOPAMAX) 100 mg, Oral, 2 times daily    venlafaxine (EFFEXOR-XR) 75 mg, Oral, Daily    No Known Allergies   Social History     Tobacco Use    Smoking status: Never    Smokeless tobacco: Never   Vaping Use    Vaping status: Never Used   Substance Use Topics    Alcohol use: No    Drug use: Never    Family History   Problem Relation Age of Onset    Diabetes Mother     Cancer Father           Objective                            Vitals I/O      Most Recent Min/Max in 24hrs   Temp   Temp  Min: 96.6 °F (35.9 °C)  Max: 98.7 °F (37.1 °C)   Pulse   Pulse  Min: 60  Max: 112   Resp   Resp  Min: 20  Max: 41   BP   BP  Min: 104/63  Max: 152/77   O2 Sat   SpO2  Min: 90 %  Max: 100 %    No intake or output data in the 24 hours ending 01/10/24 1956    Diet NPO    Invasive Monitoring           Physical Exam   Physical Exam  Eyes:      Pupils: Pupils are equal, round, and reactive to light.      Comments: Patient resists eye-opening   Skin:     General: Skin is warm and dry.   HENT:      Head: Normocephalic and atraumatic.      Mouth/Throat:      Mouth: Mucous membranes are dry.   Cardiovascular:      Rate and Rhythm: Normal rate and regular rhythm.      Pulses: Normal pulses.   Musculoskeletal:         General: No tenderness or signs of injury.   Abdominal: General: There is no distension.      Palpations: Abdomen is soft.   Constitutional:       General: She is in acute  "distress.      Appearance: She is toxic-appearing.      Interventions: She is sedated and restrained. Nasal cannula in place.   Pulmonary:      Effort: Pulmonary effort is normal.      Breath sounds: Normal breath sounds.   Psychiatric:         Behavior: Behavior is uncooperative.   Neurological:      GCS: GCS eye subscore is 1. GCS verbal subscore is 3. GCS motor subscore is 6.      Comments: Patient babbling, occasional nonsense sentences  Answers all yes/no questions with \"no\"  Spontaneously moves all extremities  Lifts bilateral lower extremities in response to \"wiggle toes\"  Moves all fingers in response to \"show a thumbs-up\"  Unable to accurately assess strength/sensation due to lack of participation   Genitourinary/Anorectal:     Comments: Ortiz in place with yellow uine  Ortiz present.          Diagnostic Studies      EKG: Sinus rhyth,  Imaging:  I have personally reviewed pertinent reports.   and I have personally reviewed pertinent films in PACS     Medications:  Scheduled PRN   [START ON 1/11/2024] acyclovir, 10 mg/kg (Ideal), Q8H  ampicillin, 2,000 mg, Q4H  [START ON 1/11/2024] aspirin, 81 mg, Daily  [START ON 1/11/2024] atorvastatin, 20 mg, Daily With Dinner  [START ON 1/11/2024] cefTRIAXone, 2,000 mg, Q12H  chlorhexidine, 15 mL, Q12H SARTHAK  [START ON 1/11/2024] dexamethasone, 6 mg, Daily  [START ON 1/11/2024] lamoTRIgine, 200 mg, BID  [START ON 1/11/2024] polyethylene glycol, 17 g, Daily  [START ON 1/11/2024] remdesivir, 100 mg, Q24H  [START ON 1/11/2024] senna-docusate sodium, 2 tablet, BID  [START ON 1/11/2024] topiramate, 150 mg, BID  vancomycin, 1,500 mg, Q24H  [START ON 1/11/2024] venlafaxine, 75 mg, Daily      acetaminophen, 650 mg, Q6H PRN  bisacodyl, 10 mg, Daily PRN  ondansetron, 4 mg, Q6H PRN       Continuous    dexmedetomidine, 0.1-0.7 mcg/kg/hr         Labs:    CBC    Recent Labs     01/09/24  1926 01/10/24  0441   WBC 2.26* 1.99*   HGB 11.4* 11.9   HCT 35.0 36.0    293     " BMP    Recent Labs     01/10/24  1219 01/10/24  1809   SODIUM 148* 149*   K 4.3 4.0   * 122*   CO2 18* 20*   AGAP 8 7   BUN 24 28*   CREATININE 1.20 1.30   CALCIUM 9.3 9.2       Coags    No recent results     Additional Electrolytes  Recent Labs     01/09/24  0342 01/10/24  0441   MG 2.0 2.2   PHOS  --  2.8          Blood Gas    No recent results  No recent results LFTs  Recent Labs     01/09/24  1926 01/10/24  0441   ALT 89* 87*   * 125*   ALKPHOS 99 97   ALB 3.3* 3.3*   TBILI 0.70 0.59       Infectious  Recent Labs     01/09/24  1926 01/10/24  0441   PROCALCITONI 0.66* 0.60*     Glucose  Recent Labs     01/10/24  0441 01/10/24  1030 01/10/24  1219 01/10/24  1809   GLUC 138 116 136 155*             Critical Care Time Statement: Upon my evaluation, this patient had a high probability of imminent or life-threatening deterioration due to acute encephalopatrhy, which required my direct attention, intervention, and personal management.  I spent a total of 45 minutes directly providing critical care services, including interpretation of complex medical databases, evaluating for the presence of life-threatening injuries or illnesses, management of organ system failure(s) , complex medical decision making (to support/prevent further life-threatening deterioration)., and interpretation of hemodynamic data. This time is exclusive of procedures, teaching, family meetings, and any prior time recorded by providers other than myself.      DANYELLE Moss

## 2024-01-11 NOTE — PROGRESS NOTES
PT currently NPO, currently not appropriate for po, will continue to monitor for ST evaluation when appropriate, will monitor care of plan. If requiring nutrition support recommend initiating TF Jevity 1.2@20mL/hr, advance by 10mL every 6-8hrs to goal of 67mL/hr, provides total volume 1608mL, 1930cal, 89g pro, 1288mL, consider water flushes 170mL every 4hrs would provide 2308mL.

## 2024-01-11 NOTE — ASSESSMENT & PLAN NOTE
"58 y.o. 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 Pt found to have COVID infection and ELIESER in ED. CTH with bifrontal hyperdensities concerning for hemorrhage vs calcifications    -1/8 neurology consulted with concern for focal seizures in setting of covid infection and worsening renal function.   -1/9 loaded with Vimpat 400 mg IV x 1 and Topiramate increased to 150 mg twice daily.   -1/10: Continued to be encephalopathic with periods of agitation/combativeness. Afebrile since 1/9 afternoon. No overt seizure-like activity noted by family or staff. On high-flow oxygen, remdesivir and steroids for underlying COVID. Renal function improved. MRI brain w/wo without evidence of stroke. Patient transferred to [facility] neuro ICU for ongoing management/video EEG monitoring  -1/11: Remains encephalopathy with Tmax 99.9/24hrs. Continues on high flow oxygen. Na trending up 152 at present, ivf changed to 0.45 % NS. Video EEG prelim report per epileptologist: \"diffuse slowing, focal slowing over left temporal region. No seizures\"  24hr events:  No new images. Afebrile. Remains on HFO and video EEG. Per EMU fellow, \"diffuse slowing, no seizures or epileptiform discharges\"  Na 150 (152)  LP attempted with low flow CSF, patient very restless. Initially clear fluid then blood tinged. Procedure aborted. Limited CSF results: M/E panel wnl, WBC 1, Gram stain with no polys or bacteria  CSF culture/gram stain, HSV PCR pending  Lamictal level elevated at 21.8, dosing adjusted/decreased to 150mg bid (home dose 200mg bid) with " "repeat level pending  Topiramate level WNL 13.1, continued at 150mg bid  Neurodiagnostics:   - CTA H/N wwo contrast 1/7/24:   \"No acute arterial vascular abnormality in the head or neck. No flow-limiting large vessel arterial vascular stenosis in the head or neck. Tiny punctate foci of relative increased parenchymal density in the frontal lobes bilaterally, unchanged from 2 hours earlier and probably representing low-density parenchymal calcifications rather than parenchymal hemorrhage. Conservative management with an additional follow-up noncontrast head CT is recommended in 24 to 48 hours. Reidentified left temporal lobe resection. Dense basal ganglia and cerebellar calcifications again noted. Groundglass and consolidative airspace opacities most suspicious for extensive pneumonia seen throughout the visualized mid and upper lung zones more dense on the right than on the left.\"  - CTH wo contrast 1/7/24:   \"Multifocal pneumonia and/or edema.\"  - routine EEG 1/8/24:   \"This is an abnormal 30 minutes awake, drowsy, and asleep EEG due to disorganized theta-delta activity. Background activities in the delta/theta range are suggestive of a moderate diffuse cerebral dysfunction. The lack of epileptiform discharges on a routine EEG does not preclude the diagnosis of seizures or epilepsy. \"  - MRI brain wo 1/9/24:  \"Tiny focus of abnormal diffusion signal within the right basal ganglia involving the anterior limb of the internal capsule not clearly restricted on the ADC maps but suspicious for small acute lacunar infarct.\"   - MRI brain seizure wo and w contrast 1/10/24:  \"Previously visualized diffusion signal abnormality in the right basal ganglia is no longer identified and may have been artifactual given underlying susceptibility artifact in the bilateral globus pallidus.\"  Relevant home meds:  Lamictal 200mg bid  Topiramate 100mg bid  Effexor 75mg daily  Relevant inpatient meds:  Vimpat 200mg bid started 1/8 (no dose " 1/8 pm due to npo); 400mg iv x1 1/9. Decreased to 150mg bid 1/12 secondary to elevated level  Topiramate 100mg bid started 1/8 (no dose 1/8 pm due to npo), increased to 150mg bid 1/9  Effexor 75mg daily  Acyclovir 650mg iv q8 hr started 1/10  Ampicillin 2gm iv q4 hr started 1/10  Rocephin 1gm iv x2 1/9-10, followed by  2gm iv q12 hr1/10 pm  Vancomycin 1250mg iv daily started 1/9; dose adjusted based on levels  Precedex gtt started 1/10  Pertinent labs:  Na 150 (152)  CSF results: M/E panel wnl, WBC 1, Gram stain with no polys or bacteria  CSF culture/gram stain, hsv pcr pending  Lamictal level slightly elevated at 21.8, repeat level pending  Topiramate level WNL 13.1  CRP 76.6  Lactate, ammonia WNL  Recommendations:  - Continue video EEG monitoring, can d/c at 48hrs if no evidence of seizure  - Decrease topiramate to 100mg BID (home dose), continue lamotrigine 150mg BID for now; increase back to home dose 200mg bid prior to discharge. Repeat Lamotrigine level pending  - Follow up pending CSF studies; recommend adding CSF protein if possible  - Seizure precautions; administer Ativan prn seizure-like activity    - Maintain telemetry   - Continue to monitor and notify Neurology with any changes.  - Medical management and correction of any metabolic or infectious disturbances per ICU. Defer antibiotics/antivirals to ID

## 2024-01-11 NOTE — CASE MANAGEMENT
Case Management Assessment & Discharge Planning Note    Patient name Carla Hunt  Location ICU 13/ICU 13 MRN [id number]  : 1965 Date 2024       Current Admission Date: 1/10/2024  Current Admission Diagnosis:Encephalopathy acute   Patient Active Problem List    Diagnosis Date Noted    Sepsis (HCC) 2024    Acute respiratory failure with hypoxia (HCC) 2024    Transaminitis 2024    Teto marginal zone B-cell lymphoma (HCC) 2024    Encephalopathy acute 2024    Stroke-like symptoms 2024    COVID 2024    Stage 3b chronic kidney disease (CKD) ([facility]) 2024    Abnormal CT of the head 2024    Nephrolithiasis 2021    Localization-related symptomatic epilepsy and epileptic syndromes with complex partial seizures, intractable, without status epilepticus ([facility]) 2020    Other specified anxiety disorders 2019    Reactive depression 2019    Hidradenitis suppurativa of left axilla 2019    Anxiety state 2018    Luetscher's syndrome 2018    Disorder of parathyroid gland ([facility]) 2018    Eczema 2018    Gastroenteritis 2018    Grand mal status ([facility]) 2018    Hypercalcemia 2018    Hypertensive disorder 2018    Impacted cerumen 2018    Open wound of hand except fingers 2018    Otitis externa 2018    Overweight 2018    Pain in face 2018    Skin sensation disturbance 2018    Subjective visual disturbance 2018    Encounter for gynecological examination (general) (routine) without abnormal findings 10/23/2018    Long term current use of hormonal contraceptive 10/23/2018    Rosacea 2015      LOS (days): 1  Geometric Mean LOS (GMLOS) (days):   Days to GMLOS:     OBJECTIVE:    Risk of Unplanned Readmission Score: 18.43         Current admission status: Inpatient       Preferred Pharmacy:   [id number] [place] - [address] [place] [address]  [address] [address]  Phone: [phone] Fax: [phone]    [facility] - [place], [place] - [address]  [address] [address]  Phone: [phone] Fax: [phone]    Primary Care Provider: Tony Guevara MD    Primary Insurance: INTER GROUP  Secondary Insurance: MEDICARE    ASSESSMENT:  Active Health Care Proxies    There are no active Health Care Proxies on file.         Readmission Root Cause  30 Day Readmission: No    Patient Information  Admitted from:: Home  Mental Status: Alert  During Assessment patient was accompanied by: Not accompanied during assessment  Assessment information provided by:: Spouse  Primary Caregiver: Self  Support Systems: Self, Spouse/significant other, Family members  County of Residence: [place]  What city do you live in?: [place]  Home entry access options. Select all that apply.: Stairs  Number of steps to enter home.: 4  Do the steps have railings?: Yes  Type of Current Residence: 2 story home  Upon entering residence, is there a bedroom on the main floor (no further steps)?: No  A bedroom is located on the following floor levels of residence (select all that apply):: 2nd Floor  Upon entering residence, is there a bathroom on the main floor (no further steps)?: No  Indicate which floors of current residence have a bathroom (select all the apply):: 2nd Floor  Number of steps to 2nd floor from main floor: One Flight  Living Arrangements: Lives w/ Spouse/significant other, Lives w/ Son  Is patient a ?: No    Activities of Daily Living Prior to Admission  Functional Status: Independent  Completes ADLs independently?: Yes  Ambulates independently?: Yes  Does patient use assisted devices?: No  Does patient currently own DME?: No  Does patient have a history of Outpatient Therapy (PT/OT)?: No  Does the patient have a history of Short-Term Rehab?: No  Does patient have a history of HHC?: No  Does patient  currently have C?: No      Patient Information Continued  Income Source: SSI/SSD  Does patient have prescription coverage?: Yes  Does patient receive dialysis treatments?: No  Does patient have a history of substance abuse?: No  Does patient have a history of Mental Health Diagnosis?: No      Means of Transportation  Means of Transport to Appts:: Drives Self      Housing Stability: Low Risk  (1/11/2024)    Housing Stability Vital Sign     Unable to Pay for Housing in the Last Year: No     Number of Places Lived in the Last Year: 1     Unstable Housing in the Last Year: No   Food Insecurity: No Food Insecurity (1/11/2024)    Hunger Vital Sign     Worried About Running Out of Food in the Last Year: Never true     Ran Out of Food in the Last Year: Never true   Transportation Needs: No Transportation Needs (1/11/2024)    PRAPARE - Transportation     Lack of Transportation (Medical): No     Lack of Transportation (Non-Medical): No   Utilities: Not At Risk (1/11/2024)    C Utilities     Threatened with loss of utilities: No       DISCHARGE DETAILS:    Discharge planning discussed with:: Spouse Min via phone  Freedom of Choice: Yes  Comments - Freedom of Choice: Min informed that CM assessment will be done over phone due to patient being COVID+.  CM contacted family/caregiver?: Yes          Contacts  Patient Contacts: Min Hunt - spouse  Relationship to Patient:: Family  Contact Method: Phone  Phone Number: [phone]  Reason/Outcome: Continuity of Care, Emergency Contact, Discharge Planning, Referral         Additional Comments: CM Resident called patients spouse, Min, to complete CM assessment and introduce role of CM.  Min provided all information regarding patient for assessment.  Patient currently lives w/ spouse and son in a 2-story home w/ 4 SENTHIL w/ railings.  Patient is independent at [facility] and does not own or use DME.  Prior to this admission, patient required no assistance with  "ADLs/IADLs and was independent.  Patient has no hx of HHC, STR, SNF, OP PT/OT.  Patient still drives.  Per Min, patient has epilepsy, but has not had a seizure since a brain surgery \"a few years ago\".  CM Resident confirmed that Min had no further questions or concerns for this CM.  CM Resident to be available.      "

## 2024-01-12 ENCOUNTER — APPOINTMENT (INPATIENT)
Dept: NEUROLOGY | Facility: CLINIC | Age: 59
DRG: 003 | End: 2024-01-12
Payer: COMMERCIAL

## 2024-01-12 ENCOUNTER — APPOINTMENT (INPATIENT)
Dept: RADIOLOGY | Facility: HOSPITAL | Age: 59
DRG: 003 | End: 2024-01-12
Payer: COMMERCIAL

## 2024-01-12 LAB
ANION GAP SERPL CALCULATED.3IONS-SCNC: 11 MMOL/L
APPEARANCE CSF: NORMAL
APTT PPP: 32 SECONDS (ref 23–37)
BASOPHILS # BLD AUTO: 0 THOUSANDS/ÂΜL (ref 0–0.1)
BASOPHILS NFR BLD AUTO: 0 % (ref 0–1)
BUN SERPL-MCNC: 24 MG/DL (ref 5–25)
C GATTII+NEOFOR DNA CSF QL NAA+NON-PROBE: NOT DETECTED
CALCIUM SERPL-MCNC: 7.8 MG/DL (ref 8.4–10.2)
CHLORIDE SERPL-SCNC: 118 MMOL/L (ref 96–108)
CMV DNA CSF QL NAA+NON-PROBE: NOT DETECTED
CO2 SERPL-SCNC: 21 MMOL/L (ref 21–32)
CREAT SERPL-MCNC: 1.12 MG/DL (ref 0.6–1.3)
E COLI K1 DNA CSF QL NAA+NON-PROBE: NOT DETECTED
EOSINOPHIL # BLD AUTO: 0.01 THOUSAND/ÂΜL (ref 0–0.61)
EOSINOPHIL NFR BLD AUTO: 0 % (ref 0–6)
ERYTHROCYTE [DISTWIDTH] IN BLOOD BY AUTOMATED COUNT: 13.7 % (ref 11.6–15.1)
EV RNA CSF QL NAA+NON-PROBE: NOT DETECTED
GFR SERPL CREATININE-BSD FRML MDRD: 54 ML/MIN/1.73SQ M
GLUCOSE SERPL-MCNC: 154 MG/DL (ref 65–140)
GP B STREP DNA CSF QL NAA+NON-PROBE: NOT DETECTED
GRAM STN SPEC: NORMAL
HAEM INFLU DNA CSF QL NAA+NON-PROBE: NOT DETECTED
HCT VFR BLD AUTO: 34.2 % (ref 34.8–46.1)
HGB BLD-MCNC: 11.4 G/DL (ref 11.5–15.4)
HHV6 DNA CSF QL NAA+NON-PROBE: NOT DETECTED
HSV1 DNA CSF QL NAA+NON-PROBE: NOT DETECTED
HSV2 DNA CSF QL NAA+NON-PROBE: NOT DETECTED
IMM GRANULOCYTES # BLD AUTO: 0.07 THOUSAND/UL (ref 0–0.2)
IMM GRANULOCYTES NFR BLD AUTO: 2 % (ref 0–2)
L MONOCYTOG DNA CSF QL NAA+NON-PROBE: NOT DETECTED
LYMPHOCYTES # BLD AUTO: 0.11 THOUSANDS/ÂΜL (ref 0.6–4.47)
LYMPHOCYTES NFR BLD AUTO: 3 % (ref 14–44)
MAGNESIUM SERPL-MCNC: 1.7 MG/DL (ref 1.9–2.7)
MCH RBC QN AUTO: 28.9 PG (ref 26.8–34.3)
MCHC RBC AUTO-ENTMCNC: 33.3 G/DL (ref 31.4–37.4)
MCV RBC AUTO: 87 FL (ref 82–98)
MONOCYTES # BLD AUTO: 0.23 THOUSAND/ÂΜL (ref 0.17–1.22)
MONOCYTES NFR BLD AUTO: 7 % (ref 4–12)
N MEN DNA CSF QL NAA+NON-PROBE: NOT DETECTED
NEUTROPHILS # BLD AUTO: 3.06 THOUSANDS/ÂΜL (ref 1.85–7.62)
NEUTS SEG NFR BLD AUTO: 88 % (ref 43–75)
NRBC BLD AUTO-RTO: 0 /100 WBCS
PARECHOVIRUS A RNA CSF QL NAA+NON-PROBE: NOT DETECTED
PHOSPHATE SERPL-MCNC: 2.5 MG/DL (ref 2.7–4.5)
PLATELET # BLD AUTO: 293 THOUSANDS/UL (ref 149–390)
PMV BLD AUTO: 9.5 FL (ref 8.9–12.7)
POTASSIUM SERPL-SCNC: 3.3 MMOL/L (ref 3.5–5.3)
RBC # BLD AUTO: 3.94 MILLION/UL (ref 3.81–5.12)
S PNEUM DNA CSF QL NAA+NON-PROBE: NOT DETECTED
SODIUM SERPL-SCNC: 150 MMOL/L (ref 135–147)
T4 FREE SERPL-MCNC: 1.16 NG/DL (ref 0.61–1.12)
TOTAL CELLS COUNTED BLD: NO
TSH SERPL DL<=0.05 MIU/L-ACNC: 4.57 UIU/ML (ref 0.45–4.5)
TUBE # CSF: 2
VZV DNA CSF QL NAA+NON-PROBE: NOT DETECTED
WBC # BLD AUTO: 3.48 THOUSAND/UL (ref 4.31–10.16)
WBC # CSF AUTO: 1 /UL (ref 0–5)

## 2024-01-12 PROCEDURE — 89051 BODY FLUID CELL COUNT: CPT | Performed by: NURSE PRACTITIONER

## 2024-01-12 PROCEDURE — 87483 CNS DNA AMP PROBE TYPE 12-25: CPT | Performed by: NURSE PRACTITIONER

## 2024-01-12 PROCEDURE — 85730 THROMBOPLASTIN TIME PARTIAL: CPT

## 2024-01-12 PROCEDURE — 95715 VEEG EA 12-26HR INTMT MNTR: CPT

## 2024-01-12 PROCEDURE — 94760 N-INVAS EAR/PLS OXIMETRY 1: CPT

## 2024-01-12 PROCEDURE — 87070 CULTURE OTHR SPECIMN AEROBIC: CPT | Performed by: NURSE PRACTITIONER

## 2024-01-12 PROCEDURE — 71045 X-RAY EXAM CHEST 1 VIEW: CPT

## 2024-01-12 PROCEDURE — 84100 ASSAY OF PHOSPHORUS: CPT

## 2024-01-12 PROCEDURE — NC001 PR NO CHARGE: Performed by: STUDENT IN AN ORGANIZED HEALTH CARE EDUCATION/TRAINING PROGRAM

## 2024-01-12 PROCEDURE — 94664 DEMO&/EVAL PT USE INHALER: CPT

## 2024-01-12 PROCEDURE — 84439 ASSAY OF FREE THYROXINE: CPT

## 2024-01-12 PROCEDURE — 80048 BASIC METABOLIC PNL TOTAL CA: CPT

## 2024-01-12 PROCEDURE — 85025 COMPLETE CBC W/AUTO DIFF WBC: CPT

## 2024-01-12 PROCEDURE — 99291 CRITICAL CARE FIRST HOUR: CPT | Performed by: EMERGENCY MEDICINE

## 2024-01-12 PROCEDURE — 92610 EVALUATE SWALLOWING FUNCTION: CPT

## 2024-01-12 PROCEDURE — 62270 DX LMBR SPI PNXR: CPT | Performed by: STUDENT IN AN ORGANIZED HEALTH CARE EDUCATION/TRAINING PROGRAM

## 2024-01-12 PROCEDURE — 84443 ASSAY THYROID STIM HORMONE: CPT

## 2024-01-12 PROCEDURE — 83735 ASSAY OF MAGNESIUM: CPT

## 2024-01-12 PROCEDURE — 99233 SBSQ HOSP IP/OBS HIGH 50: CPT | Performed by: INTERNAL MEDICINE

## 2024-01-12 PROCEDURE — 80175 DRUG SCREEN QUAN LAMOTRIGINE: CPT | Performed by: NURSE PRACTITIONER

## 2024-01-12 PROCEDURE — 95720 EEG PHY/QHP EA INCR W/VEEG: CPT | Performed by: PSYCHIATRY & NEUROLOGY

## 2024-01-12 PROCEDURE — 009U3ZX DRAINAGE OF SPINAL CANAL, PERCUTANEOUS APPROACH, DIAGNOSTIC: ICD-10-PCS | Performed by: STUDENT IN AN ORGANIZED HEALTH CARE EDUCATION/TRAINING PROGRAM

## 2024-01-12 PROCEDURE — 99232 SBSQ HOSP IP/OBS MODERATE 35: CPT | Performed by: PSYCHIATRY & NEUROLOGY

## 2024-01-12 RX ORDER — OLANZAPINE 10 MG/1
10 TABLET, ORALLY DISINTEGRATING ORAL DAILY
Status: DISCONTINUED | OUTPATIENT
Start: 2024-01-13 | End: 2024-01-13

## 2024-01-12 RX ORDER — FENTANYL CITRATE 50 UG/ML
25 INJECTION, SOLUTION INTRAMUSCULAR; INTRAVENOUS
Status: COMPLETED | OUTPATIENT
Start: 2024-01-12 | End: 2024-01-12

## 2024-01-12 RX ORDER — HEPARIN SODIUM 5000 [USP'U]/ML
5000 INJECTION, SOLUTION INTRAVENOUS; SUBCUTANEOUS EVERY 8 HOURS SCHEDULED
Status: CANCELLED | OUTPATIENT
Start: 2024-01-12

## 2024-01-12 RX ORDER — OLANZAPINE 10 MG/1
10 TABLET, ORALLY DISINTEGRATING ORAL
Status: DISCONTINUED | OUTPATIENT
Start: 2024-01-12 | End: 2024-01-12

## 2024-01-12 RX ORDER — MAGNESIUM SULFATE HEPTAHYDRATE 40 MG/ML
2 INJECTION, SOLUTION INTRAVENOUS CONTINUOUS
Status: CANCELLED | OUTPATIENT
Start: 2024-01-12

## 2024-01-12 RX ORDER — TOPIRAMATE 100 MG/1
100 TABLET, FILM COATED ORAL 2 TIMES DAILY
Status: DISCONTINUED | OUTPATIENT
Start: 2024-01-12 | End: 2024-02-20

## 2024-01-12 RX ORDER — FENTANYL CITRATE 50 UG/ML
50 INJECTION, SOLUTION INTRAMUSCULAR; INTRAVENOUS ONCE
Status: COMPLETED | OUTPATIENT
Start: 2024-01-12 | End: 2024-01-12

## 2024-01-12 RX ORDER — MAGNESIUM SULFATE HEPTAHYDRATE 40 MG/ML
2 INJECTION, SOLUTION INTRAVENOUS ONCE
Status: COMPLETED | OUTPATIENT
Start: 2024-01-12 | End: 2024-01-12

## 2024-01-12 RX ORDER — ENOXAPARIN SODIUM 100 MG/ML
40 INJECTION SUBCUTANEOUS
Status: DISCONTINUED | OUTPATIENT
Start: 2024-01-12 | End: 2024-01-13

## 2024-01-12 RX ADMIN — SODIUM CHLORIDE 100 ML/HR: 0.45 INJECTION, SOLUTION INTRAVENOUS at 23:29

## 2024-01-12 RX ADMIN — FENTANYL CITRATE 50 MCG: 50 INJECTION INTRAMUSCULAR; INTRAVENOUS at 04:42

## 2024-01-12 RX ADMIN — DEXMEDETOMIDINE HYDROCHLORIDE 1.5 MCG/KG/HR: 4 INJECTION, SOLUTION INTRAVENOUS at 06:35

## 2024-01-12 RX ADMIN — DEXMEDETOMIDINE HYDROCHLORIDE 1.5 MCG/KG/HR: 4 INJECTION, SOLUTION INTRAVENOUS at 09:31

## 2024-01-12 RX ADMIN — CHLORHEXIDINE GLUCONATE 15 ML: 1.2 SOLUTION ORAL at 20:40

## 2024-01-12 RX ADMIN — REMDESIVIR 100 MG: 100 INJECTION, POWDER, LYOPHILIZED, FOR SOLUTION INTRAVENOUS at 17:18

## 2024-01-12 RX ADMIN — ATORVASTATIN CALCIUM 20 MG: 20 TABLET, FILM COATED ORAL at 16:59

## 2024-01-12 RX ADMIN — FENTANYL CITRATE 25 MCG: 50 INJECTION INTRAMUSCULAR; INTRAVENOUS at 02:56

## 2024-01-12 RX ADMIN — DEXMEDETOMIDINE HYDROCHLORIDE 0.8 MCG/KG/HR: 4 INJECTION, SOLUTION INTRAVENOUS at 20:27

## 2024-01-12 RX ADMIN — MAGNESIUM SULFATE HEPTAHYDRATE 2 G: 40 INJECTION, SOLUTION INTRAVENOUS at 10:08

## 2024-01-12 RX ADMIN — FENTANYL CITRATE 25 MCG: 50 INJECTION INTRAMUSCULAR; INTRAVENOUS at 03:06

## 2024-01-12 RX ADMIN — VENLAFAXINE 25 MG: 25 TABLET ORAL at 17:01

## 2024-01-12 RX ADMIN — DEXMEDETOMIDINE HYDROCHLORIDE 1.5 MCG/KG/HR: 4 INJECTION, SOLUTION INTRAVENOUS at 03:43

## 2024-01-12 RX ADMIN — CEFTRIAXONE 2000 MG: 1 INJECTION, POWDER, FOR SOLUTION INTRAMUSCULAR; INTRAVENOUS at 05:33

## 2024-01-12 RX ADMIN — VENLAFAXINE 25 MG: 25 TABLET ORAL at 09:06

## 2024-01-12 RX ADMIN — SODIUM CHLORIDE 100 ML/HR: 0.45 INJECTION, SOLUTION INTRAVENOUS at 12:50

## 2024-01-12 RX ADMIN — ACYCLOVIR SODIUM 650 MG: 1000 INJECTION, SOLUTION INTRAVENOUS at 02:15

## 2024-01-12 RX ADMIN — ENOXAPARIN SODIUM 40 MG: 40 INJECTION SUBCUTANEOUS at 13:10

## 2024-01-12 RX ADMIN — AMPICILLIN SODIUM 2000 MG: 2 INJECTION, POWDER, FOR SOLUTION INTRAVENOUS at 08:58

## 2024-01-12 RX ADMIN — LAMOTRIGINE 150 MG: 100 TABLET ORAL at 20:40

## 2024-01-12 RX ADMIN — POTASSIUM PHOSPHATE, MONOBASIC POTASSIUM PHOSPHATE, DIBASIC 12 MMOL: 224; 236 INJECTION, SOLUTION, CONCENTRATE INTRAVENOUS at 10:23

## 2024-01-12 RX ADMIN — TOPIRAMATE 150 MG: 100 TABLET, FILM COATED ORAL at 09:07

## 2024-01-12 RX ADMIN — DEXMEDETOMIDINE HYDROCHLORIDE 1.5 MCG/KG/HR: 4 INJECTION, SOLUTION INTRAVENOUS at 12:52

## 2024-01-12 RX ADMIN — SENNOSIDES, DOCUSATE SODIUM 2 TABLET: 8.6; 5 TABLET ORAL at 17:01

## 2024-01-12 RX ADMIN — ACYCLOVIR SODIUM 650 MG: 1000 INJECTION, SOLUTION INTRAVENOUS at 12:58

## 2024-01-12 RX ADMIN — CHLORHEXIDINE GLUCONATE 15 ML: 1.2 SOLUTION ORAL at 08:58

## 2024-01-12 RX ADMIN — SENNOSIDES, DOCUSATE SODIUM 2 TABLET: 8.6; 5 TABLET ORAL at 08:59

## 2024-01-12 RX ADMIN — DEXMEDETOMIDINE HYDROCHLORIDE 1.5 MCG/KG/HR: 4 INJECTION, SOLUTION INTRAVENOUS at 00:36

## 2024-01-12 RX ADMIN — POLYETHYLENE GLYCOL 3350 17 G: 17 POWDER, FOR SOLUTION ORAL at 08:59

## 2024-01-12 RX ADMIN — AMPICILLIN SODIUM 2000 MG: 2 INJECTION, POWDER, FOR SOLUTION INTRAVENOUS at 04:48

## 2024-01-12 RX ADMIN — DEXMEDETOMIDINE HYDROCHLORIDE 1.2 MCG/KG/HR: 4 INJECTION, SOLUTION INTRAVENOUS at 16:20

## 2024-01-12 RX ADMIN — FENTANYL CITRATE 25 MCG: 50 INJECTION INTRAMUSCULAR; INTRAVENOUS at 03:25

## 2024-01-12 RX ADMIN — TOPIRAMATE 100 MG: 100 TABLET, FILM COATED ORAL at 17:01

## 2024-01-12 RX ADMIN — LAMOTRIGINE 150 MG: 100 TABLET ORAL at 08:59

## 2024-01-12 RX ADMIN — SODIUM CHLORIDE 100 ML/HR: 0.45 INJECTION, SOLUTION INTRAVENOUS at 00:50

## 2024-01-12 RX ADMIN — AMPICILLIN SODIUM 2000 MG: 2 INJECTION, POWDER, FOR SOLUTION INTRAVENOUS at 01:20

## 2024-01-12 RX ADMIN — FENTANYL CITRATE 25 MCG: 50 INJECTION INTRAMUSCULAR; INTRAVENOUS at 03:17

## 2024-01-12 RX ADMIN — DEXAMETHASONE SODIUM PHOSPHATE 6 MG: 10 INJECTION, SOLUTION INTRAMUSCULAR; INTRAVENOUS at 08:59

## 2024-01-12 RX ADMIN — OLANZAPINE 10 MG: 10 TABLET, ORALLY DISINTEGRATING ORAL at 11:09

## 2024-01-12 NOTE — PROGRESS NOTES
"Progress Note - Neurology   Carla Hunt 58 y.o. female [id number]  Unit/Bed#: ICU 13/ICU 13    Assessment/Plan:  Carla Hunt is a 58 y.o. female    Localization-related symptomatic epilepsy and epileptic syndromes with complex partial seizures, intractable, without status epilepticus (HCC)  Assessment & Plan  58 y.o. female with focal epilepsy s/p surgical resection of left anterior temporal lobe in 2007 (on topiramate and lamotrigine for many years and has been seizure-free), june marginal zone B cell lymphoma (maintained on rituxan hycela) with mets to bone, CKD (baseline creatinine 1.1-1.3), anxiety, depression, parathyroid disease and recent UTI who presented to [facility] 1/7/24 with 6 day history of refusing to eat, trouble finding words, slurred speech, AMS and unsteady gait. Pt found to have COVID infection and ELIESER in ED. CTH with bifrontal hyperdensities concerning for hemorrhage vs calcifications    -1/8 neurology consulted with concern for focal seizures in setting of covid infection and worsening renal function.   -1/9 loaded with Vimpat 400 mg IV x 1 and Topiramate increased to 150 mg twice daily.   -1/10: Continued to be encephalopathic with periods of agitation/combativeness. Afebrile since 1/9 afternoon. No overt seizure-like activity noted by family or staff. On high-flow oxygen, remdesivir and steroids for underlying COVID. Renal function improved. MRI brain w/wo without evidence of stroke. Patient transferred to [facility] neuro ICU for ongoing management/video EEG monitoring  -1/11: Remains encephalopathy with Tmax 99.9/24hrs. Continues on high flow oxygen. Na trending up 152 at present, ivf changed to 0.45 % NS. Video EEG prelim report per epileptologist: \"diffuse slowing, focal slowing over left temporal region. No seizures\"  24hr events:  No new images. Afebrile. Remains on HFO and video EEG. Per EMU fellow, \"diffuse slowing, no seizures or epileptiform discharges\"  Na 150 (152)  LP " "attempted with low flow CSF, patient very restless. Initially clear fluid then blood tinged. Procedure aborted. Limited CSF results: M/E panel wnl, WBC 1, Gram stain with no polys or bacteria  CSF culture/gram stain, HSV PCR pending  Lamictal level elevated at 21.8, dosing adjusted/decreased to 150mg bid (home dose 200mg bid) with repeat level pending  Topiramate level WNL 13.1, continued at 150mg bid  Neurodiagnostics:   - CTA H/N wwo contrast 1/7/24:   \"No acute arterial vascular abnormality in the head or neck. No flow-limiting large vessel arterial vascular stenosis in the head or neck. Tiny punctate foci of relative increased parenchymal density in the frontal lobes bilaterally, unchanged from 2 hours earlier and probably representing low-density parenchymal calcifications rather than parenchymal hemorrhage. Conservative management with an additional follow-up noncontrast head CT is recommended in 24 to 48 hours. Reidentified left temporal lobe resection. Dense basal ganglia and cerebellar calcifications again noted. Groundglass and consolidative airspace opacities most suspicious for extensive pneumonia seen throughout the visualized mid and upper lung zones more dense on the right than on the left.\"  - CTH wo contrast 1/7/24:   \"Multifocal pneumonia and/or edema.\"  - routine EEG 1/8/24:   \"This is an abnormal 30 minutes awake, drowsy, and asleep EEG due to disorganized theta-delta activity. Background activities in the delta/theta range are suggestive of a moderate diffuse cerebral dysfunction. The lack of epileptiform discharges on a routine EEG does not preclude the diagnosis of seizures or epilepsy. \"  - MRI brain wo 1/9/24:  \"Tiny focus of abnormal diffusion signal within the right basal ganglia involving the anterior limb of the internal capsule not clearly restricted on the ADC maps but suspicious for small acute lacunar infarct.\"   - MRI brain seizure wo and w contrast 1/10/24:  \"Previously visualized " "diffusion signal abnormality in the right basal ganglia is no longer identified and may have been artifactual given underlying susceptibility artifact in the bilateral globus pallidus.\"  Relevant home meds:  Lamictal 200mg bid  Topiramate 100mg bid  Effexor 75mg daily  Relevant inpatient meds:  Vimpat 200mg bid started 1/8 (no dose 1/8 pm due to npo); 400mg iv x1 1/9. Decreased to 150mg bid 1/12 secondary to elevated level  Topiramate 100mg bid started 1/8 (no dose 1/8 pm due to npo), increased to 150mg bid 1/9  Effexor 75mg daily  Acyclovir 650mg iv q8 hr started 1/10  Ampicillin 2gm iv q4 hr started 1/10  Rocephin 1gm iv x2 1/9-10, followed by  2gm iv q12 hr1/10 pm  Vancomycin 1250mg iv daily started 1/9; dose adjusted based on levels  Precedex gtt started 1/10  Pertinent labs:  Na 150 (152)  CSF results: M/E panel wnl, WBC 1, Gram stain with no polys or bacteria  CSF culture/gram stain, hsv pcr pending  Lamictal level slightly elevated at 21.8, repeat level pending  Topiramate level WNL 13.1  CRP 76.6  Lactate, ammonia WNL  Recommendations:  - Continue video EEG monitoring, can d/c at 48hrs if no evidence of seizure  - Decrease topiramate to 100mg BID (home dose), continue lamotrigine 150mg BID for now; increase back to home dose 200mg bid prior to discharge. Repeat Lamotrigine level pending  - Follow up pending CSF studies; recommend adding CSF protein if possible  - Seizure precautions; administer Ativan prn seizure-like activity    - Maintain telemetry   - Continue to monitor and notify Neurology with any changes.  - Medical management and correction of any metabolic or infectious disturbances per ICU. Defer antibiotics/antivirals to ID    Encephalopathy acute  Assessment & Plan  COVID +  See plan above    Stroke-like symptoms  Assessment & Plan  Patient received asa 300mg SC 1/9/24 followed by asa 81mg daily 1/10. Repeat MRI w/ contrast without evidence of stroke  Neurodiagnostics:   - MRI brain wo contrast " "24:   \"Tiny focus of abnormal diffusion signal within the right basal ganglia involving the anterior limb of the internal capsule not clearly restricted on the ADC maps but suspicious for small acute lacunar infarct.\"   - MRI brain w/wo 1/10/24:  \" Previously visualized diffusion signal abnormality in the right basal ganglia is no longer identified and may have been artifactual given underlying susceptibility artifact in the bilateral globus pallidus. \"  Pertinent Labs  Lipid Panel: Total cholesterol 118, LDL 59  Hemoglobin A1c 6.6   Relevant inpatient meds:  Asa 300mg pr x1  followed by 81mg daily 1/10  Atorvastatin 20mg daily started   Recommendations:  - D/C Stroke pathway  No need to continue Aspirin 81 mg or Atorvastatin 20 mg daily from neurology standpoint  No need to repeat MRI in 4-6 weeks as previously recommended  Goal normotension, euglycemic      Please refer to attending attestation for additional recommendations    Carla Hunt will need follow up in in 6 weeks with epilepsy attending .  She will not require outpatient neurological testing.     Subjective:   Patient remains encephalopathic. Offer no complaints at present. \"I am ready to go home\"      Past Medical History:   Diagnosis Date    Hx of hypercalcemia     Lymphoma (HCC) 2021    Parathyroid disease (HCC)     Seizures (HCC)     Situational depression     24 yr old son  from drug overdose     Past Surgical History:   Procedure Laterality Date    CRANIOTOMY FOR TEMPORAL LOBECTOMY Left      Family History   Problem Relation Age of Onset    Diabetes Mother     Cancer Father      Social History     Socioeconomic History    Marital status: /Civil Union     Spouse name: Not on file    Number of children: Not on file    Years of education: Not on file    Highest education level: Not on file   Occupational History    Not on file   Tobacco Use    Smoking status: Never    Smokeless tobacco: Never   Vaping Use    Vaping " status: Never Used   Substance and Sexual Activity    Alcohol use: No    Drug use: Never    Sexual activity: Not on file   Other Topics Concern    Not on file   Social History Narrative    Not on file     Social Determinants of Health     Financial Resource Strain: Not on file   Food Insecurity: No Food Insecurity (1/11/2024)    Hunger Vital Sign     Worried About Running Out of Food in the Last Year: Never true     Ran Out of Food in the Last Year: Never true   Transportation Needs: No Transportation Needs (1/11/2024)    PRAPARE - Transportation     Lack of Transportation (Medical): No     Lack of Transportation (Non-Medical): No   Physical Activity: Not on file   Stress: Not on file   Social Connections: Not on file   Intimate Partner Violence: Not on file   Housing Stability: Low Risk  (1/11/2024)    Housing Stability Vital Sign     Unable to Pay for Housing in the Last Year: No     Number of Places Lived in the Last Year: 1     Unstable Housing in the Last Year: No     E-Cigarette/Vaping    E-Cigarette Use Never User      E-Cigarette/Vaping Substances    Nicotine No     THC No     CBD No          Medications:  All current active meds have been reviewed and current meds:  Scheduled Meds:  Current Facility-Administered Medications   Medication Dose Route Frequency Provider Last Rate    acetaminophen  650 mg Oral Q6H PRN DANYELLE Moss      acyclovir  10 mg/kg (Ideal) Intravenous Q12H Gabe Mcgovern MD Stopped (01/12/24 1400)    atorvastatin  20 mg Oral Daily With Dinner DANYELLE Moss      bisacodyl  10 mg Rectal Daily PRN DANYELLE Moss      [START ON 1/13/2024] cefTRIAXone  2,000 mg Intravenous Q24H Erwin Ramos DO      chlorhexidine  15 mL Mouth/Throat Q12H [facility] DANYELLE Moss      dexamethasone  6 mg Intravenous Daily DANYELLE Moss      dexmedetomidine  0.1-1.5 mcg/kg/hr Intravenous Titrated DANYELLE Moss 1.5 mcg/kg/hr (01/12/24 1252)     "enoxaparin  40 mg Subcutaneous Q24H [facility] Erwin Ramos DO      lamoTRIgine  150 mg Oral BID DANYELLE Moss      [START ON 1/13/2024] OLANZapine  10 mg Oral Daily Erwin Ramos DO      ondansetron  4 mg Intravenous Q6H PRN DANYELLE Moss      polyethylene glycol  17 g Oral Daily DANYELLE Moss      remdesivir  100 mg Intravenous Q24H DANYELLE Moss      senna-docusate sodium  2 tablet Oral BID DANYELLE Moss      sodium chloride  100 mL/hr Intravenous Continuous DANYELLE Moss 100 mL/hr (01/12/24 1250)    topiramate  100 mg Oral BID Erwin Ramos DO      venlafaxine  25 mg Oral TID With Meals Erwin Ramos DO       Continuous Infusions:dexmedetomidine, 0.1-1.5 mcg/kg/hr, Last Rate: 1.5 mcg/kg/hr (01/12/24 1252)  sodium chloride, 100 mL/hr, Last Rate: 100 mL/hr (01/12/24 1250)      PRN Meds:.  acetaminophen    bisacodyl    ondansetron     ROS:   Review of Systems   Unable to perform ROS: Mental status change     Vitals:   /85 (BP Location: Left arm)   Pulse 90   Temp 98.2 °F (36.8 °C) (Oral)   Resp (!) 24   Ht 5' 8\" (1.727 m)   Wt 77.2 kg (170 lb 3.1 oz)   SpO2 95%   BMI 25.88 kg/m²     Physical Exam:   Physical Exam  Vitals reviewed.   Constitutional:       General: She is not in acute distress.     Appearance: She is ill-appearing.   HENT:      Head: Normocephalic and atraumatic.      Comments: On video EEG     Nose:      Comments: NGT  Eyes:      Extraocular Movements: EOM normal.      Pupils: Pupils are equal, round, and reactive to light.   Pulmonary:      Comments: On high flow oxygen 70%, 50L  Skin:     General: Skin is warm and dry.   Neurological:      Mental Status: She is alert.      Motor: Motor strength is normal.      Neurologic Exam     Mental Status   Follows 1 step commands.   Attention: decreased. Concentration: decreased.   Exam limited by AMS  Awake, alert and restless at times  Oriented to self only  Unable to " state her age, month/year, president, or place  Speech is mildly dysarthric  Follows simple commands     Cranial Nerves     CN II   Right visual field deficit: none  Left visual field deficit: none     CN III, IV, VI   Pupils are equal, round, and reactive to light.  Extraocular motions are normal.   Conjugate gaze: present    CN VII   Right facial weakness: none  Left facial weakness: none    CN XII   Tongue deviation: none    Motor Exam     Strength   Strength 5/5 throughout.     Sensory Exam   Light touch normal.     Gait, Coordination, and Reflexes     Tremor   Resting tremor: absent      Labs: I have personally reviewed pertinent reports.   Recent Results (from the past 24 hour(s))   Protime-INR    Collection Time: 01/11/24  3:19 PM   Result Value Ref Range    Protime 16.3 (H) 11.6 - 14.5 seconds    INR 1.32 (H) 0.84 - 1.19   Gram stain CSF    Collection Time: 01/12/24  4:08 AM    Specimen: Lumbar Puncture; Cerebrospinal Fluid   Result Value Ref Range    Gram Stain Result No No Polys or Bacteria seen    CSF white cell count with differential    Collection Time: 01/12/24  4:08 AM   Result Value Ref Range    Appearance, CSF light pink     Tube Number, CSF 2     WBC, CSF 1 0 - 5 /uL    Xanthochromia No No   Meningitis/Encephalitis (ME) Panel    Collection Time: 01/12/24  4:08 AM   Result Value Ref Range    C.NEOFORMANS/GATTII Not Detected Not Detected    CYTOMEGALOVIRUS Not Detected Not Detected    ENTEROVIRUS Not Detected Not Detected    E.COLI K1 Not Detected Not Detected    H.INFLUENZAE Not Detected Not Detected    H.SIMPLEX 1 Not Detected Not Detected    H.SIMPLEX 2 Not Detected Not Detected    HERPES VIRUS 6 Not Detected Not Detected    PARECHOVIRUS Not Detected Not Detected    L.MONOCYTOGENES Not Detected Not Detected    N.MENINGITIDIS Not Detected Not Detected    S.AGALACTIAE Not Detected Not Detected    S.PNEUMONIAE Not Detected Not Detected    V.ZOSTER Not Detected Not Detected   Phosphorus    Collection  Time: 01/12/24  5:58 AM   Result Value Ref Range    Phosphorus 2.5 (L) 2.7 - 4.5 mg/dL   Magnesium    Collection Time: 01/12/24  5:58 AM   Result Value Ref Range    Magnesium 1.7 (L) 1.9 - 2.7 mg/dL   CBC and differential    Collection Time: 01/12/24  5:58 AM   Result Value Ref Range    WBC 3.48 (L) 4.31 - 10.16 Thousand/uL    RBC 3.94 3.81 - 5.12 Million/uL    Hemoglobin 11.4 (L) 11.5 - 15.4 g/dL    Hematocrit 34.2 (L) 34.8 - 46.1 %    MCV 87 82 - 98 fL    MCH 28.9 26.8 - 34.3 pg    MCHC 33.3 31.4 - 37.4 g/dL    RDW 13.7 11.6 - 15.1 %    MPV 9.5 8.9 - 12.7 fL    Platelets 293 149 - 390 Thousands/uL    nRBC 0 /100 WBCs    Neutrophils Relative 88 (H) 43 - 75 %    Immat GRANS % 2 0 - 2 %    Lymphocytes Relative 3 (L) 14 - 44 %    Monocytes Relative 7 4 - 12 %    Eosinophils Relative 0 0 - 6 %    Basophils Relative 0 0 - 1 %    Neutrophils Absolute 3.06 1.85 - 7.62 Thousands/µL    Immature Grans Absolute 0.07 0.00 - 0.20 Thousand/uL    Lymphocytes Absolute 0.11 (L) 0.60 - 4.47 Thousands/µL    Monocytes Absolute 0.23 0.17 - 1.22 Thousand/µL    Eosinophils Absolute 0.01 0.00 - 0.61 Thousand/µL    Basophils Absolute 0.00 0.00 - 0.10 Thousands/µL   Basic metabolic panel    Collection Time: 01/12/24  5:58 AM   Result Value Ref Range    Sodium 150 (H) 135 - 147 mmol/L    Potassium 3.3 (L) 3.5 - 5.3 mmol/L    Chloride 118 (H) 96 - 108 mmol/L    CO2 21 21 - 32 mmol/L    ANION GAP 11 mmol/L    BUN 24 5 - 25 mg/dL    Creatinine 1.12 0.60 - 1.30 mg/dL    Glucose 154 (H) 65 - 140 mg/dL    Calcium 7.8 (L) 8.4 - 10.2 mg/dL    eGFR 54 ml/min/1.73sq m   APTT    Collection Time: 01/12/24  5:58 AM   Result Value Ref Range    PTT 32 23 - 37 seconds   TSH, 3rd generation with Free T4 reflex    Collection Time: 01/12/24  5:58 AM   Result Value Ref Range    TSH 3RD GENERATON 4.570 (H) 0.450 - 4.500 uIU/mL       Imaging: I have personally reviewed pertinent imaging in PACS and I have personally reviewed PACS reports.     EKG, Pathology, and  Other Studies: I have personally reviewed pertinent reports.     Counseling / Coordination of Care  Assessment, images, and plan reviewed with Dr. Garcia. Plan discussed with ICU attending/team

## 2024-01-12 NOTE — CONSULTS
Vancomycin IV Pharmacy-to-Dose Consultation    Carla Hunt is a 58 y.o. female who was receiving Vancomycin IV with management by the Pharmacy Consult service for treatment of CNS infection (goal -600, trough 15-20)  .       The patient’s Vancomycin therapy has been discontinued. Thank you for allowing us to take part in this patient's care. Pharmacy will sign-off now; please call or re-consult if there are any questions.        Gabe Henriquez, PharmD, BCCCP  Critical Care Clinical Pharmacist  Available via [employer]

## 2024-01-12 NOTE — SPEECH THERAPY NOTE
Speech Language/Pathology  Speech-Language Pathology Bedside Swallow Evaluation      Patient Name: Carla Hunt    Today's Date: 2024     Problem List  Active Problems:    Localization-related symptomatic epilepsy and epileptic syndromes with complex partial seizures, intractable, without status epilepticus (HCC)    Stroke-like symptoms    Encephalopathy acute      Past Medical History  Past Medical History:   Diagnosis Date    Hx of hypercalcemia     Lymphoma (HCC) 2021    Parathyroid disease (HCC)     Seizures (HCC)     Situational depression     24 yr old son  from drug overdose       Past Surgical History  Past Surgical History:   Procedure Laterality Date    CRANIOTOMY FOR TEMPORAL LOBECTOMY Left        Summary   Pt presented with functional s/s suggestive of moderate oral and suspected mild-moderate pharyngeal dysphagia.  She continues to have fluctuating alertness and ability to manage and control the material offered to her.   Symptoms or concerns included decreased bolus acceptance, decreased bolus propulsion, and anterior loss w/ confusion and attempts at manipulation/transfers,  suspected pharyngeal swallow delay and suspected decreased hyolaryngeal elevation upon palpation.  When alert was able to slowly clear and offer mult swallows.      Risk/s for Aspiration: JESSICA, increased O2 demands and lethargy     Recommended Diet:  for now when she alerts/awakens puree or meds crushed as needed in puree and single sips of water.     Recommended Form of Meds: crushed with puree   Aspiration precautions and swallowing strategies: upright posture, only feed when fully alert, slow rate of feeding, and small bites/sips  Other Recommendations: Continue frequent oral care, will follow        Current Medical Status  Pt is a 58 y.o. female who [facility] 24  with change in MS, stroke like symptoms.  Stroke alert called w/ imaging on the  finding punctate hyperdense foci in  both frontal regions susp for tiny foci of acute subarachnoid hemorrhage. Covid +.   She contiued to have decline in status as well as increased sz activity.  Transferred to [facility] for continuous EEG on 1/10/24.    Pt has pulled the ng several times, last one today 1/12/24.      Current Precautions:  Fall  Aspiration  Contact  AIrborn   Seizure      Allergies:  No known food allergies    Past medical history:  Please see H&P for details    Special Studies:  MRI brain seizure-Previously visualized diffusion signal abnormality in the right basal ganglia is no longer identified and may have been artifactual given underlying susceptibility artifact in the bilateral globus pallidus.     CXR 1/10/24-1. Persistence of bilateral hazy diffuse airspace opacities in the lungs, without significant change, again suspicious for pneumonia  2. Nasogastric tube extends below left hemidiaphragm   CXR 1/12/24-Worsening left-sided pulmonary opacities concerning for multi lobar infiltrates.       Social/Education/Vocational Hx:  Pt lives with family    Swallow Information   Current Risks for Dysphagia & Aspiration: AMS and decreased alertness  Current Symptoms/Concerns: change in respiratory status and poor attention  Current Diet: NPO -diet ordered prior to my assessment  Baseline Diet: regular diet and thin liquids      Baseline Assessment   Behavior/Cognition: lethargic, waxing and waning arousal level, decreased attention, and low alertness level which fluctuates for the whole assessment.   Speech/Language Status: able to follow commands inconsistently and limited verbal output  Patient Positioning: upright in bed  Pain Status/Interventions/Response to Interventions:   No report of or nonverbal indications of pain.       Swallow Mechanism Exam  Facial: symmetrical  Labial: decreased strength and decreased coordination  Lingual: bilateral decreased strength and decreased coordination  Velum: unable to visualize  Mandible:  decreased  ROM  Dentition: adequate  Vocal quality:hoarse   Volitional Cough: unable to initiate volitional cough   Respiratory Status:  on HFNC        Consistencies Assessed and Performance   Consistencies Administered: ice chips, thin liquids, and puree      Oral Stage: moderate, decreased bolus acceptance, and decreased bolus propulsion   Reduced containment w/ the ice, reduced manipulation of the material w/ loss at times w/ sips of thin by cup.  Poor seal on the cup, biting the straw at times.  WHen she is fully alert she is able to take sips from the straw, some are impulsive, some are appropriate and controlled.      Pharyngeal Stage: mild-moderate, suspected pharyngeal swallow delay, and suspected decreased hyolaryngeal elevation upon palpation  Mult swallows with the material, no overt coughing with the small sips. +cough immed w/ the larger sip.       Esophageal Concerns: none reported    Strategies and Efficacy: needs frequent consistent cues to remain alert, keep eyes open.     Summary and Recommendations (see above)    Results Reviewed with: patient, RN, and family     Treatment Recommended: yes     Frequency of treatment: as able     Patient Stated Goal:-    Dysphagia LTG  -Patient will demonstrate safe and effective oral intake (without overt s/s significant oral/pharyngeal dysphagia including s/s penetration or aspiration) for the highest appropriate diet level.     Short Term Goals:  -Pt will tolerate Dysphagia 1/pureed diet and  thin liquid with no significant s/s oral or pharyngeal dysphagia across 1-3 diagnostic session/s ONLY WHEN FULLY ALERT    -Patient will tolerate trials of upgraded food and/or liquid texture with no significant s/s of oral or pharyngeal dysphagia including aspiration across 1-3 diagnostic sessions     -Patient will comply with a Video/Modified Barium Swallow study for more complete assessment of swallowing anatomy/physiology/aspiration risk and to assess efficacy of treatment  techniques so as to best guide treatment plan    Speech Therapy Prognosis   Prognosis: fair    Prognosis Considerations: age, medical status, and prior medical history

## 2024-01-12 NOTE — PLAN OF CARE
Problem: Prexisting or High Potential for Compromised Skin Integrity  Goal: Skin integrity is maintained or improved  Description: INTERVENTIONS:  - Identify patients at risk for skin breakdown  - Assess and monitor skin integrity  - Assess and monitor nutrition and hydration status  - Monitor labs   - Assess for incontinence   - Turn and reposition patient  - Assist with mobility/ambulation  - Relieve pressure over bony prominences  - Avoid friction and shearing  - Provide appropriate hygiene as needed including keeping skin clean and dry  - Evaluate need for skin moisturizer/barrier cream  - Collaborate with interdisciplinary team   - Patient/family teaching  - Consider wound care consult   Outcome: Progressing     Problem: Nutrition/Hydration-ADULT  Goal: Nutrient/Hydration intake appropriate for improving, restoring or maintaining nutritional needs  Description: Monitor and assess patient's nutrition/hydration status for malnutrition. Collaborate with interdisciplinary team and initiate plan and interventions as ordered.  Monitor patient's weight and dietary intake as ordered or per policy. Utilize nutrition screening tool and intervene as necessary. Determine patient's food preferences and provide high-protein, high-caloric foods as appropriate.     INTERVENTIONS:  - Monitor oral intake, urinary output, labs, and treatment plans  - Assess nutrition and hydration status and recommend course of action  - Evaluate amount of meals eaten  - Assist patient with eating if necessary   - Allow adequate time for meals  - Recommend/ encourage appropriate diets, oral nutritional supplements, and vitamin/mineral supplements  - Order, calculate, and assess calorie counts as needed  - Recommend, monitor, and adjust tube feedings and TPN/PPN based on assessed needs  - Assess need for intravenous fluids  - Provide specific nutrition/hydration education as appropriate  - Include patient/family/caregiver in decisions related to  nutrition  Outcome: Progressing     Problem: SAFETY,RESTRAINT: NV/NON-SELF DESTRUCTIVE BEHAVIOR  Goal: Remains free of harm/injury (restraint for non violent/non self-detsructive behavior)  Description: INTERVENTIONS:  - Instruct patient/family regarding restraint use   - Assess and monitor physiologic and psychological status   - Provide interventions and comfort measures to meet assessed patient needs   - Identify and implement measures to help patient regain control  - Assess readiness for release of restraint   Outcome: Progressing  Goal: Returns to optimal restraint-free functioning  Description: INTERVENTIONS:  - Assess the patient's behavior and symptoms that indicate continued need for restraint  - Identify and implement measures to help patient regain control  - Assess readiness for release of restraint   Outcome: Progressing     Problem: PAIN - ADULT  Goal: Verbalizes/displays adequate comfort level or baseline comfort level  Description: Interventions:  - Encourage patient to monitor pain and request assistance  - Assess pain using appropriate pain scale  - Administer analgesics based on type and severity of pain and evaluate response  - Implement non-pharmacological measures as appropriate and evaluate response  - Consider cultural and social influences on pain and pain management  - Notify physician/advanced practitioner if interventions unsuccessful or patient reports new pain  Outcome: Progressing     Problem: INFECTION - ADULT  Goal: Absence or prevention of progression during hospitalization  Description: INTERVENTIONS:  - Assess and monitor for signs and symptoms of infection  - Monitor lab/diagnostic results  - Monitor all insertion sites, i.e. indwelling lines, tubes, and drains  - Monitor endotracheal if appropriate and nasal secretions for changes in amount and color  - [place] appropriate cooling/warming therapies per order  - Administer medications as ordered  - Instruct and encourage  patient and family to use good hand hygiene technique  - Identify and instruct in appropriate isolation precautions for identified infection/condition  Outcome: Progressing  Goal: Absence of fever/infection during neutropenic period  Description: INTERVENTIONS:  - Monitor WBC    Outcome: Progressing     Problem: SAFETY ADULT  Goal: Patient will remain free of falls  Description: INTERVENTIONS:  - Educate patient/family on patient safety including physical limitations  - Instruct patient to call for assistance with activity   - Consult OT/PT to assist with strengthening/mobility   - Keep Call bell within reach  - Keep bed low and locked with side rails adjusted as appropriate  - Keep care items and personal belongings within reach  - Initiate and maintain comfort rounds  - Make Fall Risk Sign visible to staff  - Offer Toileting every 2 Hours, in advance of need  - Initiate/Maintain Bed/Chair alarm  - Obtain necessary fall risk management equipment  - Apply yellow socks and bracelet for high fall risk patients  - Consider moving patient to room near nurses station  Outcome: Progressing  Goal: Maintain or return to baseline ADL function  Description: INTERVENTIONS:  -  Assess patient's ability to carry out ADLs; assess patient's baseline for ADL function and identify physical deficits which impact ability to perform ADLs (bathing, care of mouth/teeth, toileting, grooming, dressing, etc.)  - Assess/evaluate cause of self-care deficits   - Assess range of motion  - Assess patient's mobility; develop plan if impaired  - Assess patient's need for assistive devices and provide as appropriate  - Encourage maximum independence but intervene and supervise when necessary  - Involve family in performance of ADLs  - Assess for home care needs following discharge   - Consider OT consult to assist with ADL evaluation and planning for discharge  - Provide patient education as appropriate  Outcome: Progressing  Goal:  Maintains/Returns to pre admission functional level  Description: INTERVENTIONS:  - Perform AM-PAC 6 Click Basic Mobility/ Daily Activity assessment daily.  - Set and communicate daily mobility goal to care team and patient/family/caregiver.   - Collaborate with rehabilitation services on mobility goals if consulted  - Perform Range of Motion 10 times a day.  - Reposition patient every 2 hours.  - Dangle patient 2 times a day  - Stand patient 2 times a day  - Record patient progress and toleration of activity level   Outcome: Progressing     Problem: DISCHARGE PLANNING  Goal: Discharge to home or other facility with appropriate resources  Description: INTERVENTIONS:  - Identify barriers to discharge w/patient and caregiver  - Arrange for needed discharge resources and transportation as appropriate  - Identify discharge learning needs (meds, wound care, etc.)  - Arrange for interpretive services to assist at discharge as needed  - Refer to Case Management Department for coordinating discharge planning if the patient needs post-hospital services based on physician/advanced practitioner order or complex needs related to functional status, cognitive ability, or social support system  Outcome: Progressing     Problem: Knowledge Deficit  Goal: Patient/family/caregiver demonstrates understanding of disease process, treatment plan, medications, and discharge instructions  Description: Complete learning assessment and assess knowledge base.  Interventions:  - Provide teaching at level of understanding  - Provide teaching via preferred learning methods  Outcome: Progressing     Problem: NEUROSENSORY - ADULT  Goal: Achieves stable or improved neurological status  Description: INTERVENTIONS  - Monitor and report changes in neurological status  - Monitor vital signs such as temperature, blood pressure, glucose, and any other labs ordered   - Initiate measures to prevent increased intracranial pressure  - Monitor for seizure  activity and implement precautions if appropriate      Outcome: Progressing  Goal: Remains free of injury related to seizures activity  Description: INTERVENTIONS  - Maintain airway, patient safety  and administer oxygen as ordered  - Monitor patient for seizure activity, document and report duration and description of seizure to physician/advanced practitioner  - If seizure occurs,  ensure patient safety during seizure  - Reorient patient post seizure  - Seizure pads on all 4 side rails  - Instruct patient/family to notify RN of any seizure activity including if an aura is experienced  - Instruct patient/family to call for assistance with activity based on nursing assessment  - Administer anti-seizure medications if ordered    Outcome: Progressing  Goal: Achieves maximal functionality and self care  Description: INTERVENTIONS  - Monitor swallowing and airway patency with patient fatigue and changes in neurological status  - Encourage and assist patient to increase activity and self care.   - Encourage visually impaired, hearing impaired and aphasic patients to use assistive/communication devices  Outcome: Progressing     Problem: CARDIOVASCULAR - ADULT  Goal: Maintains optimal cardiac output and hemodynamic stability  Description: INTERVENTIONS:  - Monitor I/O, vital signs and rhythm  - Monitor for S/S and trends of decreased cardiac output  - Administer and titrate ordered vasoactive medications to optimize hemodynamic stability  - Assess quality of pulses, skin color and temperature  - Assess for signs of decreased coronary artery perfusion  - Instruct patient to report change in severity of symptoms  Outcome: Progressing  Goal: Absence of cardiac dysrhythmias or at baseline rhythm  Description: INTERVENTIONS:  - Continuous cardiac monitoring, vital signs, obtain 12 lead EKG if ordered  - Administer antiarrhythmic and heart rate control medications as ordered  - Monitor electrolytes and administer replacement  therapy as ordered  Outcome: Progressing     Problem: RESPIRATORY - ADULT  Goal: Achieves optimal ventilation and oxygenation  Description: INTERVENTIONS:  - Assess for changes in respiratory status  - Assess for changes in mentation and behavior  - Position to facilitate oxygenation and minimize respiratory effort  - Oxygen administered by appropriate delivery if ordered  - Initiate smoking cessation education as indicated  - Encourage broncho-pulmonary hygiene including cough, deep breathe, Incentive Spirometry  - Assess the need for suctioning and aspirate as needed  - Assess and instruct to report SOB or any respiratory difficulty  - Respiratory Therapy support as indicated  Outcome: Progressing     Problem: GASTROINTESTINAL - ADULT  Goal: Maintains or returns to baseline bowel function  Description: INTERVENTIONS:  - Assess bowel function  - Encourage oral fluids to ensure adequate hydration  - Administer IV fluids if ordered to ensure adequate hydration  - Administer ordered medications as needed  - Encourage mobilization and activity  - Consider nutritional services referral to assist patient with adequate nutrition and appropriate food choices  Outcome: Progressing  Goal: Maintains adequate nutritional intake  Description: INTERVENTIONS:  - Monitor percentage of each meal consumed  - Identify factors contributing to decreased intake, treat as appropriate  - Assist with meals as needed  - Monitor I&O, weight, and lab values if indicated  - Obtain nutrition services referral as needed  Outcome: Progressing     Problem: GENITOURINARY - ADULT  Goal: Maintains or returns to baseline urinary function  Description: INTERVENTIONS:  - Assess urinary function  - Encourage oral fluids to ensure adequate hydration if ordered  - Administer IV fluids as ordered to ensure adequate hydration  - Administer ordered medications as needed  - Offer frequent toileting  - Follow urinary retention protocol if ordered  Outcome:  Progressing  Goal: Absence of urinary retention  Description: INTERVENTIONS:  - Assess patient’s ability to void and empty bladder  - Monitor I/O  - Bladder scan as needed  - Discuss with physician/AP medications to alleviate retention as needed  - Discuss catheterization for long term situations as appropriate  Outcome: Progressing     Problem: METABOLIC, FLUID AND ELECTROLYTES - ADULT  Goal: Electrolytes maintained within normal limits  Description: INTERVENTIONS:  - Monitor labs and assess patient for signs and symptoms of electrolyte imbalances  - Administer electrolyte replacement as ordered  - Monitor response to electrolyte replacements, including repeat lab results as appropriate  - Instruct patient on fluid and nutrition as appropriate  Outcome: Progressing  Goal: Fluid balance maintained  Description: INTERVENTIONS:  - Monitor labs   - Monitor I/O and WT  - Instruct patient on fluid and nutrition as appropriate  - Assess for signs & symptoms of volume excess or deficit  Outcome: Progressing     Problem: SKIN/TISSUE INTEGRITY - ADULT  Goal: Skin Integrity remains intact(Skin Breakdown Prevention)  Description: Assess:  -Perform Flavio assessment every shift  -Clean and moisturize skin every shift and PRN  -Inspect skin when repositioning, toileting, and assisting with ADLS  -Assess under medical devices every shift  -Assess extremities for adequate circulation and sensation     Bed Management:  -Have minimal linens on bed & keep smooth, unwrinkled  -Change linens as needed when moist or perspiring  -Avoid sitting or lying in one position for more than 2 hours while in bed  -Keep HOB at 30 degrees     Toileting:  -Offer bedside commode  -Assess for incontinence every 2 hours  -Use incontinent care products after each incontinent episode     Activity:  -Mobilize patient 2 times a day  -Encourage activity and walks on unit  -Encourage or provide ROM exercises   -Turn and reposition patient every 2 Hours  -Use  appropriate equipment to lift or move patient in bed  -Instruct/ Assist with weight shifting every 30 minutes when out of bed in chair  -Consider limitation of chair time 3 hour intervals    Skin Care:  -Avoid use of baby powder, tape, friction and shearing, hot water or constrictive clothing  -Relieve pressure over bony prominences   -Do not massage red bony areas    Next Steps:  -Teach patient strategies to minimize risks    -Consider consults to  interdisciplinary teams   Outcome: Progressing  Goal: Incision(s), wounds(s) or drain site(s) healing without S/S of infection  Description: INTERVENTIONS  - Assess and document dressing, incision, wound bed, drain sites and surrounding tissue  - Provide patient and family education  - Perform skin care/dressing changes every shift and PRN  Outcome: Progressing     Problem: HEMATOLOGIC - ADULT  Goal: Maintains hematologic stability  Description: INTERVENTIONS  - Assess for signs and symptoms of bleeding or hemorrhage  - Monitor labs  - Administer supportive blood products/factors as ordered and appropriate  Outcome: Progressing     Problem: MUSCULOSKELETAL - ADULT  Goal: Maintain or return mobility to safest level of function  Description: INTERVENTIONS:  - Assess patient's ability to carry out ADLs; assess patient's baseline for ADL function and identify physical deficits which impact ability to perform ADLs (bathing, care of mouth/teeth, toileting, grooming, dressing, etc.)  - Assess/evaluate cause of self-care deficits   - Assess range of motion  - Assess patient's mobility  - Assess patient's need for assistive devices and provide as appropriate  - Encourage maximum independence but intervene and supervise when necessary  - Involve family in performance of ADLs  - Assess for home care needs following discharge   - Consider OT consult to assist with ADL evaluation and planning for discharge  - Provide patient education as appropriate  Outcome: Progressing     Problem:  COPING  Goal: Pt/Family able to verbalize concerns and demonstrate effective coping strategies  Description: INTERVENTIONS:  - Assist patient/family to identify coping skills, available support systems and cultural and spiritual values  - Provide emotional support, including active listening and acknowledgement of concerns of patient and caregivers  - Reduce environmental stimuli, as able  - Provide patient education  - Assess for spiritual pain/suffering and initiate spiritual care, including notification of Pastoral Care or nat[place] based community as needed  - Assess effectiveness of coping strategies  Outcome: Progressing  Goal: Will report anxiety at manageable levels  Description: INTERVENTIONS:  - Administer medication as ordered  - Teach and encourage coping skills  - Provide emotional support  - Assess patient/family for anxiety and ability to cope  Outcome: Progressing     Problem: Potential for Falls  Goal: Patient will remain free of falls  Description: INTERVENTIONS:  - Educate patient/family on patient safety including physical limitations  - Instruct patient to call for assistance with activity   - Consult OT/PT to assist with strengthening/mobility   - Keep Call bell within reach  - Keep bed low and locked with side rails adjusted as appropriate  - Keep care items and personal belongings within reach  - Initiate and maintain comfort rounds  - Make Fall Risk Sign visible to staff  - Offer Toileting every 2 Hours, in advance of need  - Initiate/Maintain Bed/Chair alarm  - Obtain necessary fall risk management equipment  - Apply yellow socks and bracelet for high fall risk patients  - Consider moving patient to room near nurses station  Outcome: Progressing

## 2024-01-12 NOTE — PROGRESS NOTES
[facility] - Progress Note  Carla Hunt 58 y.o. female MRN: [id number]  Unit/Bed#: ICU 13 Encounter: [id number]      ASSESSMENT & PLAN:    Encephalopathy  -Patient presenting with worsening encephalopathy and abdominal pain, positive for COVID and with signs of pneumonia on imaging.   -1/7 CT head and neck: No acute arterial vascular abnormality in the head or neck. No flow-limiting large vessel arterial vascular stenosis in the head or neck. Tiny punctate foci of relative increased parenchymal density in the frontal lobes bilaterally, unchanged from 2 hours earlier and probably representing low-density parenchymal calcifications rather than parenchymal hemorrhage. Conservative management with an additional follow-up noncontrast head CT is recommended in 24 to 48 hours. Reidentified left temporal lobe resection. Dense basal ganglia and cerebellar calcifications again noted. Groundglass and consolidative airspace opacities most suspicious for extensive pneumonia seen throughout the visualized mid and upper lung zones more dense on the right than on the left.  -1/10 MRI Brain: Previously visualized diffusion signal abnormality in the right basal ganglia is no longer identified and may have been artifactual given underlying susceptibility artifact in the bilateral globus pallidus.   -1/10 CXR: 1. Persistence of bilateral hazy diffuse airspace opacities in the lungs, without significant change, again suspicious for pneumonia  2. Nasogastric tube extends below left hemidiaphragm.  -Currently bradycardic, requiring HFNC.  -Lactic Acid 0.9 -> 1.3   -Procal 0.66 -> 0.6 -> 0.37.  -WBC 2.26 -> 1.99 -> 2.91 -> 3.48.  -1/7 BCX NGTD.  -Covid positive.  -1/7 Urine culture: Lactobacillus.  -1/9 BXR negative @ 24 hours.  -Strep/Legionella urine antigen negative.  -Currently on Acyclovir 650 mg Q8, day 3, Ampicillin 2G Q4, day 3, Ceftriaxone 2G Q12, day 4, Vancomycin day 4.  -On Remdesivir, day 4.  -On  Decadron 6 mg IV daily.  -S/P 1/12 LP: gram stain negative, culture pending.  -Meningitis/Encephalitis panel negative.     Plan:  -Unclear etiology of encephalopathy - consider seizures with epilepsy history vs infectious vs metabolic.  -Imaging findings consistent with pneumonia - on appropriate treatment for CAP.  -Per critical care, discussing with neuro regarding utility of repeat LP considering minimal fluid was obtained.  -At this time, would be comfortable with D/Cing Ampicillin and Vancomycin with LP less suggestive of bacterial meninginitis, even accounting for abx prior to LP, and continuing ctx for potential CAP. Cannot rule out HSV encephalitis at this time, would continue acyclovir.  -If repeat LP is performed would suggest dedicated HSV PCR.  -Covid pathway.  -F/U cultures.  -Monitor VEEG results.  -Monitor vitals closely.     Chronic Kidney Disease  -Appears at baseline.     Covid-19 Positive  -See above A&P.     Hypernatremia  -Per primary team.     Epilepsy  -On Lamictal and Topamax.  -On VEEG.  -Per neurology and primary team.  ______________________________________________________________________    SUBJECTIVE:     No acute events overnight. Patient seen and examined. She is considerably more calm and conversational today, though she does have significant difficulty answering questions such as orientation to place. Denies pain, headache, SOB. ROS limited by above.    Medication Administration - last 24 hours from 01/11/2024 0938 to 01/12/2024 0938         Date/Time Order Dose Route Action Action by     01/11/2024 1709 EST lamoTRIgine (LaMICtal) tablet 200 mg 200 mg Oral Given Jess Moses RN     01/11/2024 1013 EST lamoTRIgine (LaMICtal) tablet 200 mg 200 mg Oral Given Annetta Falk RN     01/11/2024 1012 EST venlafaxine (EFFEXOR-XR) 24 hr capsule 75 mg 75 mg Oral Not Given Annetta Falk RN     01/11/2024 1702 EST atorvastatin (LIPITOR) tablet 20 mg 20 mg Oral Given Jess Moses,  RN     01/12/2024 0907 EST topiramate (TOPAMAX) tablet 150 mg 150 mg Oral Given Karely Eastman, RN     01/11/2024 1709 EST topiramate (TOPAMAX) tablet 150 mg 150 mg Oral Given Jess Moses, RN     01/11/2024 1012 EST topiramate (TOPAMAX) tablet 150 mg 150 mg Oral Given Annetta Falk, RN     01/12/2024 0858 EST chlorhexidine (PERIDEX) 0.12 % oral rinse 15 mL 15 mL Mouth/Throat Given Karely Eastman, RN     01/11/2024 2132 EST chlorhexidine (PERIDEX) 0.12 % oral rinse 15 mL 15 mL Mouth/Throat Given Macarena Flores, RN     01/11/2024 0939 EST chlorhexidine (PERIDEX) 0.12 % oral rinse 15 mL 15 mL Mouth/Throat Given Annetta Falk, RN     01/11/2024 1715 EST remdesivir (Veklury) 100 mg in sodium chloride 0.9 % 270 mL IVPB 100 mg Intravenous New Bag Jess Moses, RN     01/12/2024 0859 EST dexamethasone (PF) (DECADRON) injection 6 mg 6 mg Intravenous Given Karely Eastman, RN     01/11/2024 0939 EST dexamethasone (PF) (DECADRON) injection 6 mg 6 mg Intravenous Given Annetta Falk, RN     01/12/2024 0859 EST senna-docusate sodium (SENOKOT S) 8.6-50 mg per tablet 2 tablet 2 tablet Oral Given Karely Eastman, RN     01/11/2024 1710 EST senna-docusate sodium (SENOKOT S) 8.6-50 mg per tablet 2 tablet 2 tablet Oral Given Jess Moses, RN     01/11/2024 0939 EST senna-docusate sodium (SENOKOT S) 8.6-50 mg per tablet 2 tablet 2 tablet Oral Given Annetta Falk, RN     01/12/2024 0859 EST polyethylene glycol (MIRALAX) packet 17 g 17 g Oral Given Karely Eastman, RN     01/11/2024 0939 EST polyethylene glycol (MIRALAX) packet 17 g 17 g Oral Given Annetta Falk, RN     01/12/2024 0931 EST dexmedeTOMIDine (Precedex) 400 mcg in sodium chloride 0.9% 100 mL 1.5 mcg/kg/hr Intravenous New Bag Karely Eastman RN     01/12/2024 0635 EST dexmedeTOMIDine (Precedex) 400 mcg in sodium chloride 0.9% 100 mL 1.5 mcg/kg/hr Intravenous New Bag Macarena Flores RN     01/12/2024 0343 EST dexmedeTOMIDine (Precedex) 400 mcg  in sodium chloride 0.9% 100 mL 1.5 mcg/kg/hr Intravenous New Bag Nico Tinajero, MARSHA     01/12/2024 0036 EST dexmedeTOMIDine (Precedex) 400 mcg in sodium chloride 0.9% 100 mL 1.5 mcg/kg/hr Intravenous New Bag Hannah Torres, MARSHA     01/11/2024 2149 EST dexmedeTOMIDine (Precedex) 400 mcg in sodium chloride 0.9% 100 mL 1.5 mcg/kg/hr Intravenous New Bag Macarena Flores RN     01/11/2024 2128 EST dexmedeTOMIDine (Precedex) 400 mcg in sodium chloride 0.9% 100 mL 1.5 mcg/kg/hr Intravenous Rate/Dose Change Macarena Flores RN     01/11/2024 2109 EST dexmedeTOMIDine (Precedex) 400 mcg in sodium chloride 0.9% 100 mL 1.2 mcg/kg/hr Intravenous Rate/Dose Change Macarena Flores RN     01/11/2024 2050 EST dexmedeTOMIDine (Precedex) 400 mcg in sodium chloride 0.9% 100 mL 1 mcg/kg/hr Intravenous Rate/Dose Change Macarena Flores RN     01/11/2024 2034 EST dexmedeTOMIDine (Precedex) 400 mcg in sodium chloride 0.9% 100 mL 0.8 mcg/kg/hr Intravenous Rate/Dose Change Macarena Flores RN     01/11/2024 1630 EST dexmedeTOMIDine (Precedex) 400 mcg in sodium chloride 0.9% 100 mL 0.6 mcg/kg/hr Intravenous New Bag Jess Moses RN     01/12/2024 0533 EST cefTRIAXone (ROCEPHIN) 2,000 mg in dextrose 5 % 50 mL IVPB 2,000 mg Intravenous New Bag Macarena Flores RN     01/11/2024 1839 EST cefTRIAXone (ROCEPHIN) 2,000 mg in dextrose 5 % 50 mL IVPB 2,000 mg Intravenous New Bag Jess Moses RN     01/11/2024 1342 EST acyclovir (ZOVIRAX) 650 mg in sodium chloride 0.9 % 100 mL IVPB 0 mg Intravenous Stopped Macarena Flores RN     01/11/2024 1242 EST acyclovir (ZOVIRAX) 650 mg in sodium chloride 0.9 % 100 mL IVPB 650 mg Intravenous New Bag Annetta Falk, MARSHA     01/12/2024 0858 EST ampicillin (OMNIPEN) 2,000 mg in sodium chloride 0.9 % 100 mL IVPB 2,000 mg Intravenous New Bag Karely Eastman, MARSHA     01/12/2024 0448 EST ampicillin (OMNIPEN) 2,000 mg in sodium chloride 0.9 % 100 mL IVPB 2,000 mg Intravenous New Bag Maacrena Flores RN     01/12/2024 0120 EST  ampicillin (OMNIPEN) 2,000 mg in sodium chloride 0.9 % 100 mL IVPB 2,000 mg Intravenous New Bag Frank Samuel     01/11/2024 2131 EST ampicillin (OMNIPEN) 2,000 mg in sodium chloride 0.9 % 100 mL IVPB 2,000 mg Intravenous New Bag Macarena Flores RN     01/11/2024 1822 EST ampicillin (OMNIPEN) 2,000 mg in sodium chloride 0.9 % 100 mL IVPB 2,000 mg Intravenous New Bag Jess Moses RN     01/11/2024 1406 EST ampicillin (OMNIPEN) 2,000 mg in sodium chloride 0.9 % 100 mL IVPB 2,000 mg Intravenous New Bag Annetta Falk RN     01/12/2024 0050 EST sodium chloride infusion 0.45 % 100 mL/hr Intravenous New Bag Macarena Flores RN     01/11/2024 1444 EST sodium chloride infusion 0.45 % 100 mL/hr Intravenous New Bag Annetta Falk RN     01/11/2024 2225 EST vancomycin (VANCOCIN) 1,250 mg in sodium chloride 0.9 % 250 mL IVPB 1,250 mg Intravenous New Bag Macarena Flores RN     01/12/2024 0906 EST venlafaxine (EFFEXOR) tablet 25 mg 25 mg Oral Given Karely Eastman RN     01/11/2024 1711 EST venlafaxine (EFFEXOR) tablet 25 mg 25 mg Oral Given Jess Moses RN     01/11/2024 1406 EST venlafaxine (EFFEXOR) tablet 25 mg 25 mg Oral Given Annetta Falk RN     01/11/2024 1124 EST haloperidol lactate (HALDOL) injection 2 mg 2 mg Intramuscular Given Ester Savage RN     01/12/2024 0215 EST acyclovir (ZOVIRAX) 650 mg in sodium chloride 0.9 % 100 mL IVPB 650 mg Intravenous New Bag Frank Baker     01/12/2024 0325 EST fentanyl citrate (PF) 100 MCG/2ML 25 mcg 25 mcg Intravenous Given Macarena Flores RN     01/12/2024 0317 EST fentanyl citrate (PF) 100 MCG/2ML 25 mcg 25 mcg Intravenous Given Macarena Flores RN     01/12/2024 0306 EST fentanyl citrate (PF) 100 MCG/2ML 25 mcg 25 mcg Intravenous Given Macarena Flores RN     01/12/2024 0256 EST fentanyl citrate (PF) 100 MCG/2ML 25 mcg 25 mcg Intravenous Given Macarena Flores RN     01/12/2024 0859 EST lamoTRIgine (LaMICtal) tablet 150 mg 150 mg Oral Given Karely Eastman RN      "01/12/2024 0442 EST fentanyl citrate (PF) 100 MCG/2ML 50 mcg 50 mcg Intravenous Given Macarena Flores RN            OBJECTIVE:     Objective   Blood pressure 142/78, pulse 60, temperature 98.4 °F (36.9 °C), temperature source Oral, resp. rate 16, height 5' 8\" (1.727 m), weight 77.2 kg (170 lb 3.1 oz), SpO2 99%. Body mass index is 25.88 kg/m².    Intake/Output Summary (Last 24 hours) at 1/12/2024 0938  Last data filed at 1/12/2024 0900  Gross per 24 hour   Intake 4080.15 ml   Output 3130 ml   Net 950.15 ml       PHYSICAL EXAM:   Physical Exam  Vitals reviewed.   Constitutional:       Appearance: Normal appearance. She is ill-appearing.   HENT:      Head: Normocephalic and atraumatic.      Right Ear: External ear normal.      Left Ear: External ear normal.      Nose: Nose normal.      Mouth/Throat:      Mouth: Mucous membranes are moist.   Eyes:      Extraocular Movements: Extraocular movements intact.      Pupils: Pupils are equal, round, and reactive to light.   Cardiovascular:      Rate and Rhythm: Normal rate and regular rhythm.      Pulses: Normal pulses.      Heart sounds: Normal heart sounds.   Pulmonary:      Effort: Pulmonary effort is normal.      Comments: Decreased breath sounds.  Abdominal:      General: Abdomen is flat. Bowel sounds are normal.      Palpations: Abdomen is soft.   Musculoskeletal:      Cervical back: No rigidity or tenderness.      Right lower leg: No edema.      Left lower leg: No edema.   Skin:     General: Skin is warm.      Capillary Refill: Capillary refill takes less than 2 seconds.   Neurological:      Mental Status: She is alert.      Comments: Oriented x1 but more conversational than prior.          Invasive Devices       Peripheral Intravenous Line  Duration             Peripheral IV 01/09/24 Left;Ventral (anterior) Wrist 3 days    Peripheral IV 01/11/24 Distal;Right;Upper;Ventral (anterior) Arm 1 day    Peripheral IV 01/11/24 Right Forearm 1 day              Drain  Duration      "        Urethral Catheter 1 day    NG/OG/Enteral Tube Nasogastric Left nare <1 day                    LAB RESULTS:  Admission on 01/10/2024   Component Date Value    Legionella Urinary Antig* 01/10/2024 Negative     Strep pneumoniae antigen* 01/10/2024 Negative     Ventricular Rate 01/10/2024 83     Atrial Rate 01/10/2024 83     WI Interval 01/10/2024 167     QRSD Interval 01/10/2024 79     QT Interval 01/10/2024 358     QTC Interval 01/10/2024 421     P Axis 01/10/2024 61     QRS [place] 01/10/2024 49     T Wave [place] 01/10/2024 61     Ventricular Rate 01/10/2024 56     Atrial Rate 01/10/2024 56     WI Interval 01/10/2024 167     QRSD Interval 01/10/2024 79     QT Interval 01/10/2024 433     QTC Interval 01/10/2024 418     P Axis 01/10/2024 48     QRS Axis 01/10/2024 21     T Wave [place] 01/10/2024 30     Ventricular Rate 01/10/2024 56     Atrial Rate 01/10/2024 56     WI Interval 01/10/2024 163     QRSD Interval 01/10/2024 83     QT Interval 01/10/2024 433     QTC Interval 01/10/2024 418     P Axis 01/10/2024 53     QRS [place] 01/10/2024 32     T Wave [place] 01/10/2024 44     Sodium 01/11/2024 150 (H)     Potassium 01/11/2024 3.9     Chloride 01/11/2024 121 (H)     CO2 01/11/2024 18 (L)     ANION GAP 01/11/2024 11     BUN 01/11/2024 28 (H)     Creatinine 01/11/2024 1.26     Glucose 01/11/2024 168 (H)     Calcium 01/11/2024 9.0     eGFR 01/11/2024 47     WBC 01/11/2024 2.91 (L)     RBC 01/11/2024 4.18     Hemoglobin 01/11/2024 12.2     Hematocrit 01/11/2024 37.5     MCV 01/11/2024 90     MCH 01/11/2024 29.2     MCHC 01/11/2024 32.5     RDW 01/11/2024 13.8     MPV 01/11/2024 9.7     Platelets 01/11/2024 332     Procalcitonin 01/11/2024 0.37 (H)     CRP 01/11/2024 76.6 (H)     Vancomycin Rm 01/11/2024 28.7 (H)     Sodium 01/11/2024 152 (H)     Potassium 01/11/2024 3.9     Chloride 01/11/2024 120 (H)     CO2 01/11/2024 20 (L)     ANION GAP 01/11/2024 12     BUN 01/11/2024 26 (H)     Creatinine 01/11/2024 1.23     Glucose  01/11/2024 185 (H)     Calcium 01/11/2024 9.0     Corrected Calcium 01/11/2024 9.5     AST 01/11/2024 97 (H)     ALT 01/11/2024 77 (H)     Alkaline Phosphatase 01/11/2024 92     Total Protein 01/11/2024 5.8 (L)     Albumin 01/11/2024 3.4 (L)     Total Bilirubin 01/11/2024 0.53     eGFR 01/11/2024 48     LACTIC ACID 01/11/2024 1.3     Magnesium 01/11/2024 2.1     Phosphorus 01/11/2024 2.8     pH, Arterial 01/11/2024 7.350     pCO2, Arterial 01/11/2024 32.6 (L)     pO2, Arterial 01/11/2024 76.7     HCO3, Arterial 01/11/2024 17.6 (L)     Base Excess, Arterial 01/11/2024 -7.0     O2 Content, Arterial 01/11/2024 17.1     O2 HGB,Arterial  01/11/2024 94.0     Segmented % 01/11/2024 88 (H)     Bands % 01/11/2024 7     Lymphocytes % 01/11/2024 5 (L)     Monocytes % 01/11/2024 0 (L)     Eosinophils, % 01/11/2024 0     Basophils % 01/11/2024 0     Absolute Neutrophils 01/11/2024 2.76     Lymphocytes Absolute 01/11/2024 0.15 (L)     Monocytes Absolute 01/11/2024 0.00     Eosinophils Absolute 01/11/2024 0.00     Basophils Absolute 01/11/2024 0.00     RBC Morphology 01/11/2024 Present     Platelet Estimate 01/11/2024 Adequate     Andrea Cells 01/11/2024 Present     Ovalocytes 01/11/2024 Present     Poikilocytes 01/11/2024 Present     Protime 01/11/2024 16.3 (H)     INR 01/11/2024 1.32 (H)     Phosphorus 01/12/2024 2.5 (L)     Magnesium 01/12/2024 1.7 (L)     WBC 01/12/2024 3.48 (L)     RBC 01/12/2024 3.94     Hemoglobin 01/12/2024 11.4 (L)     Hematocrit 01/12/2024 34.2 (L)     MCV 01/12/2024 87     MCH 01/12/2024 28.9     MCHC 01/12/2024 33.3     RDW 01/12/2024 13.7     MPV 01/12/2024 9.5     Platelets 01/12/2024 293     nRBC 01/12/2024 0     Neutrophils Relative 01/12/2024 88 (H)     Immat GRANS % 01/12/2024 2     Lymphocytes Relative 01/12/2024 3 (L)     Monocytes Relative 01/12/2024 7     Eosinophils Relative 01/12/2024 0     Basophils Relative 01/12/2024 0     Neutrophils Absolute 01/12/2024 3.06     Immature Grans Absolute  01/12/2024 0.07     Lymphocytes Absolute 01/12/2024 0.11 (L)     Monocytes Absolute 01/12/2024 0.23     Eosinophils Absolute 01/12/2024 0.01     Basophils Absolute 01/12/2024 0.00     Sodium 01/12/2024 150 (H)     Potassium 01/12/2024 3.3 (L)     Chloride 01/12/2024 118 (H)     CO2 01/12/2024 21     ANION GAP 01/12/2024 11     BUN 01/12/2024 24     Creatinine 01/12/2024 1.12     Glucose 01/12/2024 154 (H)     Calcium 01/12/2024 7.8 (L)     eGFR 01/12/2024 54     PTT 01/12/2024 32     Gram Stain Result 01/12/2024 No No Polys or Bacteria seen     Appearance, CSF 01/12/2024 light pink     Tube Number, CSF 01/12/2024 2     WBC, CSF 01/12/2024 1     Xanthochromia 01/12/2024 No     C.NEOFORMANS/GATTII 01/12/2024 Not Detected     CYTOMEGALOVIRUS 01/12/2024 Not Detected     ENTEROVIRUS 01/12/2024 Not Detected     E.COLI K1 01/12/2024 Not Detected     H.INFLUENZAE 01/12/2024 Not Detected     H.SIMPLEX 1 01/12/2024 Not Detected     H.SIMPLEX 2 01/12/2024 Not Detected     HERPES VIRUS 6 01/12/2024 Not Detected     PARECHOVIRUS 01/12/2024 Not Detected     L.MONOCYTOGENES 01/12/2024 Not Detected     N.MENINGITIDIS 01/12/2024 Not Detected     S.AGALACTIAE 01/12/2024 Not Detected     S.PNEUMONIAE 01/12/2024 Not Detected     V.ZOSTER 01/12/2024 Not Detected        RADIOLOGY RESULTS: I have personally reviewed pertinent imaging studies.      Gabe Mcgovern MD  [facility]  Internal Medicine Residency PGY-2

## 2024-01-12 NOTE — PLAN OF CARE
Problem: Prexisting or High Potential for Compromised Skin Integrity  Goal: Skin integrity is maintained or improved  Description: INTERVENTIONS:  - Identify patients at risk for skin breakdown  - Assess and monitor skin integrity  - Assess and monitor nutrition and hydration status  - Monitor labs   - Assess for incontinence   - Turn and reposition patient  - Assist with mobility/ambulation  - Relieve pressure over bony prominences  - Avoid friction and shearing  - Provide appropriate hygiene as needed including keeping skin clean and dry  - Evaluate need for skin moisturizer/barrier cream  - Collaborate with interdisciplinary team   - Patient/family teaching  - Consider wound care consult   1/11/2024 2114 by Macarena Flores RN  Outcome: Progressing  1/11/2024 2113 by Macarena Flores RN  Outcome: Progressing     Problem: Nutrition/Hydration-ADULT  Goal: Nutrient/Hydration intake appropriate for improving, restoring or maintaining nutritional needs  Description: Monitor and assess patient's nutrition/hydration status for malnutrition. Collaborate with interdisciplinary team and initiate plan and interventions as ordered.  Monitor patient's weight and dietary intake as ordered or per policy. Utilize nutrition screening tool and intervene as necessary. Determine patient's food preferences and provide high-protein, high-caloric foods as appropriate.     INTERVENTIONS:  - Monitor oral intake, urinary output, labs, and treatment plans  - Assess nutrition and hydration status and recommend course of action  - Evaluate amount of meals eaten  - Assist patient with eating if necessary   - Allow adequate time for meals  - Recommend/ encourage appropriate diets, oral nutritional supplements, and vitamin/mineral supplements  - Order, calculate, and assess calorie counts as needed  - Recommend, monitor, and adjust tube feedings and TPN/PPN based on assessed needs  - Assess need for intravenous fluids  - Provide specific  nutrition/hydration education as appropriate  - Include patient/family/caregiver in decisions related to nutrition  1/11/2024 2114 by Macarena Flores RN  Outcome: Progressing  1/11/2024 2113 by Macarena Flores RN  Outcome: Progressing     Problem: SAFETY,RESTRAINT: NV/NON-SELF DESTRUCTIVE BEHAVIOR  Goal: Remains free of harm/injury (restraint for non violent/non self-detsructive behavior)  Description: INTERVENTIONS:  - Instruct patient/family regarding restraint use   - Assess and monitor physiologic and psychological status   - Provide interventions and comfort measures to meet assessed patient needs   - Identify and implement measures to help patient regain control  - Assess readiness for release of restraint   1/11/2024 2114 by Macarena Flores RN  Outcome: Progressing  1/11/2024 2113 by Macarena Flores RN  Outcome: Progressing  Goal: Returns to optimal restraint-free functioning  Description: INTERVENTIONS:  - Assess the patient's behavior and symptoms that indicate continued need for restraint  - Identify and implement measures to help patient regain control  - Assess readiness for release of restraint   1/11/2024 2114 by Macarena Flores RN  Outcome: Progressing  1/11/2024 2113 by Macarena Flores RN  Outcome: Progressing

## 2024-01-12 NOTE — QUICK NOTE
LP attempted bedside    Pt still hypoxic agitated    If more LP fluid needed can re assess early next week    Pt likely requires intubation/general to be still given physiology, sedation is not appropriate for an agitated patient    IR will not plan on any procedure at this time

## 2024-01-12 NOTE — RESPIRATORY THERAPY NOTE
01/12/24 0842   Respiratory Assessment   Assessment Type Assess only   Bilateral Breath Sounds Diminished;Clear   Resp Comments Pt found on 100%, 02 decreased to 70%. 02 sat 97%. BS decreased clear. Will continue to monitor pt.   Oxygen Therapy/Pulse Ox   O2 Device High flow nasal cannula   O2 Therapy Oxygen humidified   FiO2 (%) 70   O2 Flow Rate (L/min) 55 L/min   SpO2 Activity At Rest   Oximetry Probe Site Changed Yes   $ Pulse Oximetry Spot Check Charge Completed

## 2024-01-12 NOTE — PROGRESS NOTES
[facility]  Progress Note: Critical Care  Name: Carla Hunt 58 y.o. female I MRN: [id number]  Unit/Bed#: ICU 13 I Date of Admission: 1/10/2024   Date of Service: 1/12/2024 I Hospital Day: 2    Assessment/Plan   Neuro:   Diagnosis: Acute encephalopathy, history of epilepsy  Plan: Frequent neurologic monitoring, Precedex for patient comfort, follow results of vEEG, LP with small amount of fluid- sent, follow-up with IR, lamictal level high so dose decreased and level re-ordered, continue Topamax, continue Effexor, appreciate neurology recommendations  CV:   Diagnosis: Hyperlipidemia  Plan: Continue home statin  Pulm:  Diagnosis: Acute hypoxic respiratory failure  Plan: Continue oxygen supplementation for goal SpO2>88%, respiratory protocol, steroids per COVID protocol  GI:   Plan: Continue bowel regimen, consider PPI while NPO  :   Diagnosis: CKD 3  Plan: Appears at baseline renal function, consider discontinuing marie cathet, close intake and output, trend renal indices as needed  F/E/N:   Diagnosis: Hypernatremia  Plan: Continue 0.45 NS, consider initiation of enteral nutrition, replete electrolytes as needed  Heme/Onc:   Diagnosis: History of B-cell lymphoma  Plan: Holding anticoagulation, resume pending LP decision  Endo:   Plan: Follow blood glucose q6 while NPO  ID:   Diagnosis: Severe sepsis, COVID-19 pneumonia  Plan: Day 4 of antimicrobials, currently on ampicillin/ceftriaxone/vancomycin, remdesivir/acyclovir and s/p Actemra, follow temperature/white count/cultrue results, appreciate ID recommendations  MSK/Skin:   Plan: Frequent turning and repositioning    Disposition: Critical care    ICU Core Measures     A: Assess, Prevent, and Manage Pain Has pain been assessed? Yes  Need for changes to pain regimen? No   B: Both SAT/SAT  N/A   C: Choice of Sedation RASS Goal: N/A patient not on sedation  Need for changes to sedation or analgesia regimen? NA   D: Delirium  CAM-ICU: Positive   E: Early Mobility  Plan for early mobility? Yes   F: Family Engagement Plan for family engagement today? Yes       Antibiotic Review: Infectious disease consulted    Review of Invasive Devices:    Marie Plan:  consider removal of marie catheter today        Prophylaxis:  VTE Contraindicated secondary to: possible LP   Stress Ulcer  not ordered        Significant 24hr Events     24hr events: LP attempted with one tube collected and sent, NGT re-inserted, Lamictal level high so dose reduced this AM     Subjective     Review of Systems   Unable to perform ROS: Mental status change        Objective                            Vitals I/O      Most Recent Min/Max in 24hrs   Temp (!) 97.4 °F (36.3 °C) Temp  Min: 95.9 °F (35.5 °C)  Max: 99.6 °F (37.6 °C)   Pulse 98 Pulse  Min: 46  Max: 98   Resp (!) 50 Resp  Min: 13  Max: 50   BP (!) 181/101 BP  Min: 76/60  Max: 181/101   O2 Sat (!) 68 % (Provider and Respiratory made aware. Charge nurse at bedside) SpO2  Min: 68 %  Max: 100 %      Intake/Output Summary (Last 24 hours) at 1/12/2024 0355  Last data filed at 1/12/2024 0200  Gross per 24 hour   Intake 4100.15 ml   Output 3140 ml   Net 960.15 ml       Diet NPO    Invasive Monitoring           Physical Exam   Physical Exam  Eyes:      Pupils: Pupils are equal, round, and reactive to light.   Skin:     General: Skin is warm and dry.   HENT:      Head: Normocephalic and atraumatic.      Mouth/Throat:      Mouth: Mucous membranes are dry.   Cardiovascular:      Rate and Rhythm: Normal rate and regular rhythm.      Pulses: Normal pulses.   Musculoskeletal:         General: No tenderness or signs of injury.   Abdominal: General: There is no distension.      Palpations: Abdomen is soft.      Tenderness: There is no abdominal tenderness.   Constitutional:       General: She is not in acute distress.     Appearance: She is ill-appearing.      Interventions: Nasal cannula and face mask in place.   Pulmonary:       Effort: Pulmonary effort is normal.      Breath sounds: Rhonchi present.   Neurological:      GCS: GCS eye subscore is 4. GCS verbal subscore is 3. GCS motor subscore is 6.      Comments: Patient intermittently responding to request in all 4 extremities  Nonsense sentences     Genitourinary/Anorectal:     Comments: Ortiz in place with clear yellow urine  Ortiz present.          Diagnostic Studies      EKG: Sinus rhythm  Imaging:  I have personally reviewed pertinent reports.   and I have personally reviewed pertinent films in PACS     Medications:  Scheduled PRN   acyclovir, 10 mg/kg (Ideal), Q12H  ampicillin, 2,000 mg, Q4H  atorvastatin, 20 mg, Daily With Dinner  cefTRIAXone, 2,000 mg, Q12H  chlorhexidine, 15 mL, Q12H SARTHAK  dexamethasone, 6 mg, Daily  lamoTRIgine, 200 mg, BID  polyethylene glycol, 17 g, Daily  remdesivir, 100 mg, Q24H  senna-docusate sodium, 2 tablet, BID  topiramate, 150 mg, BID  vancomycin, 1,250 mg, Q24H  venlafaxine, 25 mg, TID With Meals      acetaminophen, 650 mg, Q6H PRN  bisacodyl, 10 mg, Daily PRN  ondansetron, 4 mg, Q6H PRN       Continuous    dexmedetomidine, 0.1-1.5 mcg/kg/hr, Last Rate: 1.5 mcg/kg/hr (01/12/24 0343)  sodium chloride, 100 mL/hr, Last Rate: 100 mL/hr (01/12/24 0050)         Labs:    CBC    Recent Labs     01/10/24  0441 01/11/24  0454   WBC 1.99* 2.91*   HGB 11.9 12.2   HCT 36.0 37.5    332   BANDSPCT  --  7     BMP    Recent Labs     01/11/24  0023 01/11/24  0454   SODIUM 150* 152*   K 3.9 3.9   * 120*   CO2 18* 20*   AGAP 11 12   BUN 28* 26*   CREATININE 1.26 1.23   CALCIUM 9.0 9.0       Coags    Recent Labs     01/11/24  1519   INR 1.32*        Additional Electrolytes  Recent Labs     01/10/24  0441 01/11/24  0454   MG 2.2 2.1   PHOS 2.8 2.8          Blood Gas    Recent Labs     01/11/24  0512   PHART 7.350   SAA3FNF 32.6*   PO2ART 76.7   QSB4KGH 17.6*   BEART -7.0     No recent results LFTs  Recent Labs     01/10/24  0441 01/11/24  0454   ALT 87* 77*    * 97*   ALKPHOS 97 92   ALB 3.3* 3.4*   TBILI 0.59 0.53       Infectious  Recent Labs     01/10/24  0441 01/11/24  0611   PROCALCITONI 0.60* 0.37*     Glucose  Recent Labs     01/10/24  1219 01/10/24  1809 01/11/24  0023 01/11/24  0454   GLUC 136 155* 168* 185*           DANYELLE Moss

## 2024-01-12 NOTE — UTILIZATION REVIEW
NOTIFICATION OF INPATIENT ADMISSION      AUTHORIZATION REQUEST   SERVICING FACILITY:   [facility]  Address: [address] [place] [address]  Tax ID: [id number]  NPI: [id number] ATTENDING PROVIDER:  Attending Name and NPI#: Zabrina Calle Do [[id number]]  Address: [address] [place] [address]  Phone: [phone]   ADMISSION INFORMATION:  Place of Service: Inpatient [facility]  Place of Service Code: 21  Inpatient Admission Date/Time: 1/10/24  7:41 PM  Discharge Date/Time: No discharge date for patient encounter.  Admitting Diagnosis Code/Description:  Stroke-like symptoms [R29.90]     UTILIZATION REVIEW CONTACT:  Yumiko Concepcion, Utilization   Network Utilization Review Department  Phone: [phone]  Fax: [phone]  Email: [email or web address]  Contact for approvals/pending authorizations, clinical reviews, and discharge.     PHYSICIAN ADVISORY SERVICES:  Medical Necessity Denial & Nzdw-id-Vqnn Review  Phone: [phone]  Fax: [phone]  Email: [email or web address]     DISCHARGE SUPPORT TEAM:  For Patients Discharge Needs & Updates  Phone: [phone] opt. 2 Fax: [phone]  Email: [email or web address]

## 2024-01-12 NOTE — UTILIZATION REVIEW
Continued Stay Review    Date: 1/12                          Current Patient Class: Inpatient  Current Level of Care: Critical Care    HPI:58 y.o. female initially admitted on 1/10     Assessment/Plan:   24hr events: LP attempted with one tube collected and sent, NGT re-inserted, Lamictal level high so dose reduced this AM. 0,45 salien IVFs for hypernatremia.   Continue Iv antibiotics, Iv Remdesivir and Iv Steriod. Neuro checks. Precedex. Add Zyprexa. Will decrease Topiramate back to home dose.     Per Neurology; Continue video EEG monitoring, can d/c at 48hrs if no evidence of seizure  Decrease topiramate to 100mg BID (home dose), continue lamotrigine 150mg BID for now; increase back to home dose 200mg bid prior to discharge. Repeat Lamotrigine level pending  Follow up pending CSF studies; recommend adding CSF protein if possible  Seizure precautions; administer Ativan prn seizure-like activity    Maintain telemetry     Per IR; LP attempted bedside  Pt still hypoxic agitated  If more LP fluid needed can re assess early next week  Pt likely requires intubation/general to be still given physiology, sedation is not appropriate for an agitated patient  IR will not plan on any procedure at this time     Vital Signs:   01/12/24 1600 -- -- -- -- -- -- 70 -- 55 L/min -- High flow nasal cannula HFNC prongs --   01/12/24 1530 98 °F (36.7 °C) 86 18 137/88 106 97 % 70 -- 55 L/min -- High flow nasal cannula -- --   01/12/24 1400 -- 90 24 Abnormal  139/85 100 95 % -- -- -- -- -- -- --   01/12/24 1330 -- 56 23 Abnormal  122/73 87 91 % -- -- -- -- -- -- --   01/12/24 1300 -- 56 22 133/69 82 98 % -- -- -- -- -- -- --   01/12/24 1203 -- -- -- -- -- -- 70 -- 55 L/min -- High flow nasal cannula HFNC prongs      Pertinent Labs/Diagnostic Results:   1/12  PCXR - Worsening left-sided pulmonary opacities concerning for multi lobar infiltrates.     Results from last 7 days   Lab Units 01/07/24 1916   SARS-COV-2  Positive*     Results from  last 7 days   Lab Units 01/12/24  0558 01/11/24 0454 01/10/24  0441 01/09/24  1926 01/09/24  0343   WBC Thousand/uL 3.48* 2.91* 1.99* 2.26* 3.01*   HEMOGLOBIN g/dL 11.4* 12.2 11.9 11.4* 12.5   HEMATOCRIT % 34.2* 37.5 36.0 35.0 38.1   PLATELETS Thousands/uL 293 332 293 264 291   NEUTROS ABS Thousands/µL 3.06  --  1.71* 1.98  --    BANDS PCT %  --  7  --   --   --          Results from last 7 days   Lab Units 01/12/24  0558 01/11/24 0454 01/11/24  0023 01/10/24  1809 01/10/24  1219 01/10/24  1030 01/10/24  0441 01/09/24  1926 01/09/24  0342   SODIUM mmol/L 150* 152* 150* 149* 148*   < > 148*   < > 143   POTASSIUM mmol/L 3.3* 3.9 3.9 4.0 4.3   < > 4.0   < > 4.1   CHLORIDE mmol/L 118* 120* 121* 122* 122*   < > 121*   < > 117*   CO2 mmol/L 21 20* 18* 20* 18*   < > 18*   < > 19*   ANION GAP mmol/L 11 12 11 7 8   < > 9   < > 7   BUN mg/dL 24 26* 28* 28* 24   < > 21   < > 18   CREATININE mg/dL 1.12 1.23 1.26 1.30 1.20   < > 1.20   < > 1.29   EGFR ml/min/1.73sq m 54 48 47 45 50   < > 50   < > 46   CALCIUM mg/dL 7.8* 9.0 9.0 9.2 9.3   < > 8.8   < > 9.1   MAGNESIUM mg/dL 1.7* 2.1  --   --   --   --  2.2  --  2.0   PHOSPHORUS mg/dL 2.5* 2.8  --   --   --   --  2.8  --   --     < > = values in this interval not displayed.     Results from last 7 days   Lab Units 01/11/24  0454 01/10/24  1030 01/10/24  0441 01/09/24 1926 01/07/24  1916   AST U/L 97*  --  125* 135* 141*   ALT U/L 77*  --  87* 89* 92*   ALK PHOS U/L 92  --  97 99 123*   TOTAL PROTEIN g/dL 5.8*  --  5.8* 5.7* 6.5   ALBUMIN g/dL 3.4*  --  3.3* 3.3* 3.7   TOTAL BILIRUBIN mg/dL 0.53  --  0.59 0.70 0.60   BILIRUBIN DIRECT mg/dL  --   --   --   --  0.23*   AMMONIA umol/L  --  46  --   --   --      Results from last 7 days   Lab Units 01/10/24  1126 01/07/24  1904   POC GLUCOSE mg/dl 117 138     Results from last 7 days   Lab Units 01/12/24  0558 01/11/24  0454 01/11/24  0023 01/10/24  1809 01/10/24  1219 01/10/24  1030 01/10/24  0441 01/09/24  1926 01/09/24  0342  "01/08/24  0410 01/07/24 1916   GLUCOSE RANDOM mg/dL 154* 185* 168* 155* 136 116 138 107 102 109 131         Results from last 7 days   Lab Units 01/09/24  0343   HEMOGLOBIN A1C % 6.6*   EAG mg/dl 143     No results found for: \"BETA-HYDROXYBUTYRATE\"   Results from last 7 days   Lab Units 01/11/24  0512   PH ART  7.350   PCO2 ART mm Hg 32.6*   PO2 ART mm Hg 76.7   HCO3 ART mmol/L 17.6*   BASE EXC ART mmol/L -7.0   O2 CONTENT ART mL/dL 17.1   O2 HGB, ARTERIAL % 94.0             Results from last 7 days   Lab Units 01/09/24 1926   CK TOTAL U/L 314*     Results from last 7 days   Lab Units 01/07/24 1916   HS TNI 0HR ng/L 12     Results from last 7 days   Lab Units 01/09/24 1926   D-DIMER QUANTITATIVE ug/ml FEU 1.98*     Results from last 7 days   Lab Units 01/12/24  0558 01/11/24  1519 01/07/24 1916   PROTIME seconds  --  16.3* 14.6*   INR   --  1.32* 1.07   PTT seconds 32  --  38*     Results from last 7 days   Lab Units 01/12/24  0558   TSH 3RD GENERATON uIU/mL 4.570*     Results from last 7 days   Lab Units 01/11/24  0611 01/10/24  0441 01/09/24  1926 01/09/24  0335   PROCALCITONIN ng/ml 0.37* 0.60* 0.66* 0.63*     Results from last 7 days   Lab Units 01/11/24  0454 01/09/24 1926 01/09/24  0335 01/07/24 1916   LACTIC ACID mmol/L 1.3 0.9 1.0 1.8       Results from last 7 days   Lab Units 01/09/24 2036   HEP B S AG  Non-reactive   HEP C AB  Non-reactive   HEP B C IGM  Non-reactive   HEP B C TOTAL AB  Non-reactive         Results from last 7 days   Lab Units 01/11/24  0454 01/10/24  0441 01/09/24 1926   CRP mg/L 76.6* 110.4* 88.6*             Results from last 7 days   Lab Units 01/07/24  7234   CLARITY UA  Clear   COLOR UA  Light Yellow   SPEC GRAV UA  1.038*   PH UA  6.5   GLUCOSE UA mg/dl Negative   KETONES UA mg/dl Negative   BLOOD UA  Small*   PROTEIN UA mg/dl Trace*   NITRITE UA  Negative   BILIRUBIN UA  Negative   UROBILINOGEN UA (BE) mg/dl <2.0   LEUKOCYTES UA  Small*   WBC UA /hpf 4-10*   RBC UA /hpf " 2-4*   BACTERIA UA /hpf None Seen   EPITHELIAL CELLS WET PREP /hpf Occasional     Results from last 7 days   Lab Units 01/10/24  2115 01/07/24  1916   STREP PNEUMONIAE ANTIGEN, URINE  Negative  --    LEGIONELLA URINARY ANTIGEN  Negative  --    INFLUENZA A PCR   --  Negative   INFLUENZA B PCR   --  Negative   RSV PCR   --  Negative       Results from last 7 days   Lab Units 01/12/24  0408 01/09/24 2039 01/09/24 2035 01/07/24 2256 01/07/24 1916 01/07/24 1910   BLOOD CULTURE   --  No Growth at 48 hrs. No Growth at 48 hrs.  --  No Growth After 4 Days. No Growth After 4 Days.   GRAM STAIN RESULT  No No Polys or Bacteria seen  --   --   --   --   --    URINE CULTURE   --   --   --  60,000-69,000 cfu/ml Lactobacillus species*  --   --          Results from last 7 days   Lab Units 01/12/24  0408   APPEARANCE CSF  light pink   TUBE NUM CSF  2   WBC CSF /uL 1   XANTHOCHROMIA  No     Results from last 7 days   Lab Units 01/11/24  0454   VANCOMYCIN RM ug/mL 28.7*       Medications:   Scheduled Medications:  acyclovir, 10 mg/kg (Ideal), Intravenous, Q12H  atorvastatin, 20 mg, Oral, Daily With Dinner  [START ON 1/13/2024] cefTRIAXone, 2,000 mg, Intravenous, Q24H  chlorhexidine, 15 mL, Mouth/Throat, Q12H SARTHAK  dexamethasone, 6 mg, Intravenous, Daily  enoxaparin, 40 mg, Subcutaneous, Q24H SARTHAK  lamoTRIgine, 150 mg, Oral, BID  [START ON 1/13/2024] OLANZapine, 10 mg, Oral, Daily  polyethylene glycol, 17 g, Oral, Daily  remdesivir, 100 mg, Intravenous, Q24H  senna-docusate sodium, 2 tablet, Oral, BID  topiramate, 100 mg, Oral, BID  venlafaxine, 25 mg, Oral, TID With Meals      Continuous IV Infusions:  dexmedetomidine, 0.1-1.5 mcg/kg/hr, Intravenous, Titrated  sodium chloride, 100 mL/hr, Intravenous, Continuous      PRN Meds:  acetaminophen, 650 mg, Oral, Q6H PRN  bisacodyl, 10 mg, Rectal, Daily PRN  ondansetron, 4 mg, Intravenous, Q6H PRN        Discharge Plan: TBD    Network Utilization Review Department  ATTENTION: Please call  with any questions or concerns to [phone] and carefully listen to the prompts so that you are directed to the right person. All voicemails are confidential.   For Discharge needs, contact Care Management DC Support Team at [phone] opt. 2  Send all requests for admission clinical reviews, approved or denied determinations and any other requests to dedicated fax number below belonging to the [place] where the patient is receiving treatment. List of dedicated fax numbers for the Facilities:  FACILITY NAME UR FAX NUMBER   ADMISSION DENIALS (Administrative/Medical Necessity) [phone]   DISCHARGE SUPPORT TEAM (NETWORK) [phone]   PARENT CHILD HEALTH (Maternity/NICU/Pediatrics) [phone]   [facility] [phone]   [facility] [phone]   [facility] [phone]   [facility] [phone]   [facility] [phone]   [facility] [phone]   [facility] [phone]   [facility] [phone]   [facility] [phone]   [facility] [phone]   [facility] [phone]

## 2024-01-12 NOTE — PROCEDURES
"Lumbar puncture    Date/Time: 1/12/2024 3:18 AM    Performed by: Zabrina Calle DO  Authorized by: Zabrina Calle DO  Universal Protocol:  Procedure performed by:  Consent: Written consent obtained.  Risks and benefits: risks, benefits and alternatives were discussed  Consent given by: spouse  Time out: Immediately prior to procedure a \"time out\" was called to verify the correct patient, procedure, equipment, support staff and site/side marked as required.  Procedure consent: procedure consent matches procedure scheduled  Test results: test results available and properly labeled  Required items: required blood products, implants, devices, and special equipment available  Patient identity confirmed: arm band    Patient location:  Bedside  Pre-procedure details:     Preparation: Patient was prepped and draped in usual sterile fashion    Indications:     Indications: evaluation for infection and evaluation for altered mental status    Sedation:     Sedation type:  Anxiolysis (On Precedex gtt + Fentanyl PRN)  Anesthesia (see MAR for exact dosages):     Anesthesia method:  Local infiltration    Local anesthetic:  Lidocaine 1% w/o epi  Procedure details:     Lumbar space:  L4-L5 interspace    Patient position:  L lateral decubitus    Equipment: Lumbar puncture kit used      Needle gauge:  20G x 3.5in    Needle type:  Spinal needle - Quincke tip    Ultrasound guidance: no      Number of attempts:  3    Fluid appearance:  Blood-tinged    Tubes of fluid:  2    Total volume (ml):  2  Post-procedure:     Puncture site:  Adhesive bandage applied    Patient tolerance of procedure:  Tolerated with difficulty    Complication comments (i.e., not enough fluid obtained, etc.):  Low flow CSF, patient very restless. Initially clear fluid then blood tinged. Procedure aborted    Patient instructed to lie flat for one(1) hour post lumbar puncture.: Yes            "

## 2024-01-13 ENCOUNTER — APPOINTMENT (INPATIENT)
Dept: NEUROLOGY | Facility: CLINIC | Age: 59
DRG: 003 | End: 2024-01-13
Payer: COMMERCIAL

## 2024-01-13 ENCOUNTER — APPOINTMENT (INPATIENT)
Dept: RADIOLOGY | Facility: HOSPITAL | Age: 59
DRG: 003 | End: 2024-01-13
Payer: COMMERCIAL

## 2024-01-13 LAB
ANION GAP SERPL CALCULATED.3IONS-SCNC: 11 MMOL/L
ANISOCYTOSIS BLD QL SMEAR: PRESENT
BACTERIA BLD CULT: NORMAL
BACTERIA BLD CULT: NORMAL
BASOPHILS # BLD MANUAL: 0 THOUSAND/UL (ref 0–0.1)
BASOPHILS NFR MAR MANUAL: 0 % (ref 0–1)
BUN SERPL-MCNC: 18 MG/DL (ref 5–25)
CALCIUM SERPL-MCNC: 7.5 MG/DL (ref 8.4–10.2)
CHLORIDE SERPL-SCNC: 114 MMOL/L (ref 96–108)
CO2 SERPL-SCNC: 23 MMOL/L (ref 21–32)
CREAT SERPL-MCNC: 0.96 MG/DL (ref 0.6–1.3)
EOSINOPHIL # BLD MANUAL: 0 THOUSAND/UL (ref 0–0.4)
EOSINOPHIL NFR BLD MANUAL: 0 % (ref 0–6)
ERYTHROCYTE [DISTWIDTH] IN BLOOD BY AUTOMATED COUNT: 13.5 % (ref 11.6–15.1)
GFR SERPL CREATININE-BSD FRML MDRD: 65 ML/MIN/1.73SQ M
GLUCOSE SERPL-MCNC: 120 MG/DL (ref 65–140)
GLUCOSE SERPL-MCNC: 146 MG/DL (ref 65–140)
GLUCOSE SERPL-MCNC: 175 MG/DL (ref 65–140)
GLUCOSE SERPL-MCNC: 216 MG/DL (ref 65–140)
HCT VFR BLD AUTO: 34.7 % (ref 34.8–46.1)
HGB BLD-MCNC: 11.6 G/DL (ref 11.5–15.4)
LYMPHOCYTES # BLD AUTO: 0 % (ref 14–44)
LYMPHOCYTES # BLD AUTO: 0 THOUSAND/UL (ref 0.6–4.47)
MAGNESIUM SERPL-MCNC: 2 MG/DL (ref 1.9–2.7)
MCH RBC QN AUTO: 28.8 PG (ref 26.8–34.3)
MCHC RBC AUTO-ENTMCNC: 33.4 G/DL (ref 31.4–37.4)
MCV RBC AUTO: 86 FL (ref 82–98)
MONOCYTES # BLD AUTO: 0.24 THOUSAND/UL (ref 0–1.22)
MONOCYTES NFR BLD: 3 % (ref 4–12)
NEUTROPHILS # BLD MANUAL: 7.68 THOUSAND/UL (ref 1.85–7.62)
NEUTS BAND NFR BLD MANUAL: 8 % (ref 0–8)
NEUTS SEG NFR BLD AUTO: 89 % (ref 43–75)
PHOSPHATE SERPL-MCNC: 2.5 MG/DL (ref 2.7–4.5)
PLATELET # BLD AUTO: 302 THOUSANDS/UL (ref 149–390)
PLATELET BLD QL SMEAR: ADEQUATE
PMV BLD AUTO: 9.6 FL (ref 8.9–12.7)
POIKILOCYTOSIS BLD QL SMEAR: PRESENT
POTASSIUM SERPL-SCNC: 3 MMOL/L (ref 3.5–5.3)
RBC # BLD AUTO: 4.03 MILLION/UL (ref 3.81–5.12)
RBC MORPH BLD: PRESENT
SODIUM SERPL-SCNC: 148 MMOL/L (ref 135–147)
WBC # BLD AUTO: 7.92 THOUSAND/UL (ref 4.31–10.16)

## 2024-01-13 PROCEDURE — 94760 N-INVAS EAR/PLS OXIMETRY 1: CPT

## 2024-01-13 PROCEDURE — 82948 REAGENT STRIP/BLOOD GLUCOSE: CPT

## 2024-01-13 PROCEDURE — 99291 CRITICAL CARE FIRST HOUR: CPT | Performed by: NURSE PRACTITIONER

## 2024-01-13 PROCEDURE — 92526 ORAL FUNCTION THERAPY: CPT

## 2024-01-13 PROCEDURE — NC001 PR NO CHARGE: Performed by: EMERGENCY MEDICINE

## 2024-01-13 PROCEDURE — 94664 DEMO&/EVAL PT USE INHALER: CPT

## 2024-01-13 PROCEDURE — 85007 BL SMEAR W/DIFF WBC COUNT: CPT

## 2024-01-13 PROCEDURE — 83735 ASSAY OF MAGNESIUM: CPT

## 2024-01-13 PROCEDURE — 94640 AIRWAY INHALATION TREATMENT: CPT

## 2024-01-13 PROCEDURE — NC001 PR NO CHARGE: Performed by: NURSE PRACTITIONER

## 2024-01-13 PROCEDURE — 80048 BASIC METABOLIC PNL TOTAL CA: CPT

## 2024-01-13 PROCEDURE — 95712 VEEG 2-12 HR INTMT MNTR: CPT

## 2024-01-13 PROCEDURE — 84100 ASSAY OF PHOSPHORUS: CPT

## 2024-01-13 PROCEDURE — 85027 COMPLETE CBC AUTOMATED: CPT

## 2024-01-13 PROCEDURE — 99233 SBSQ HOSP IP/OBS HIGH 50: CPT | Performed by: INTERNAL MEDICINE

## 2024-01-13 PROCEDURE — 71045 X-RAY EXAM CHEST 1 VIEW: CPT

## 2024-01-13 PROCEDURE — 95720 EEG PHY/QHP EA INCR W/VEEG: CPT | Performed by: PSYCHIATRY & NEUROLOGY

## 2024-01-13 RX ORDER — OLANZAPINE 10 MG/1
10 TABLET, ORALLY DISINTEGRATING ORAL 2 TIMES DAILY
Status: DISCONTINUED | OUTPATIENT
Start: 2024-01-13 | End: 2024-01-15

## 2024-01-13 RX ORDER — INSULIN LISPRO 100 [IU]/ML
1-6 INJECTION, SOLUTION INTRAVENOUS; SUBCUTANEOUS
Status: DISCONTINUED | OUTPATIENT
Start: 2024-01-13 | End: 2024-01-15

## 2024-01-13 RX ORDER — DEXAMETHASONE SODIUM PHOSPHATE 10 MG/ML
0.1 INJECTION, SOLUTION INTRAMUSCULAR; INTRAVENOUS EVERY 12 HOURS
Status: COMPLETED | OUTPATIENT
Start: 2024-01-13 | End: 2024-01-22

## 2024-01-13 RX ORDER — ENOXAPARIN SODIUM 100 MG/ML
30 INJECTION SUBCUTANEOUS EVERY 12 HOURS SCHEDULED
Status: DISCONTINUED | OUTPATIENT
Start: 2024-01-13 | End: 2024-01-15

## 2024-01-13 RX ORDER — POTASSIUM CHLORIDE 14.9 MG/ML
20 INJECTION INTRAVENOUS
Qty: 200 ML | Refills: 0 | Status: COMPLETED | OUTPATIENT
Start: 2024-01-13 | End: 2024-01-13

## 2024-01-13 RX ORDER — INSULIN LISPRO 100 [IU]/ML
1-5 INJECTION, SOLUTION INTRAVENOUS; SUBCUTANEOUS
Status: DISCONTINUED | OUTPATIENT
Start: 2024-01-13 | End: 2024-01-15

## 2024-01-13 RX ADMIN — OLANZAPINE 10 MG: 10 TABLET, ORALLY DISINTEGRATING ORAL at 20:20

## 2024-01-13 RX ADMIN — LAMOTRIGINE 150 MG: 100 TABLET ORAL at 09:38

## 2024-01-13 RX ADMIN — POLYETHYLENE GLYCOL 3350 17 G: 17 POWDER, FOR SOLUTION ORAL at 09:45

## 2024-01-13 RX ADMIN — LAMOTRIGINE 150 MG: 100 TABLET ORAL at 20:23

## 2024-01-13 RX ADMIN — DEXMEDETOMIDINE HYDROCHLORIDE 1.2 MCG/KG/HR: 4 INJECTION, SOLUTION INTRAVENOUS at 01:56

## 2024-01-13 RX ADMIN — DEXMEDETOMIDINE HYDROCHLORIDE 0.2 MCG/KG/HR: 4 INJECTION, SOLUTION INTRAVENOUS at 20:06

## 2024-01-13 RX ADMIN — VENLAFAXINE 25 MG: 25 TABLET ORAL at 18:06

## 2024-01-13 RX ADMIN — TOPIRAMATE 100 MG: 100 TABLET, FILM COATED ORAL at 18:07

## 2024-01-13 RX ADMIN — ENOXAPARIN SODIUM 30 MG: 30 INJECTION SUBCUTANEOUS at 20:21

## 2024-01-13 RX ADMIN — VENLAFAXINE 25 MG: 25 TABLET ORAL at 13:16

## 2024-01-13 RX ADMIN — INSULIN LISPRO 1 UNITS: 100 INJECTION, SOLUTION INTRAVENOUS; SUBCUTANEOUS at 22:12

## 2024-01-13 RX ADMIN — ATORVASTATIN CALCIUM 20 MG: 20 TABLET, FILM COATED ORAL at 18:06

## 2024-01-13 RX ADMIN — SENNOSIDES, DOCUSATE SODIUM 2 TABLET: 8.6; 5 TABLET ORAL at 09:38

## 2024-01-13 RX ADMIN — POTASSIUM CHLORIDE 20 MEQ: 14.9 INJECTION, SOLUTION INTRAVENOUS at 08:59

## 2024-01-13 RX ADMIN — VENLAFAXINE 25 MG: 25 TABLET ORAL at 09:35

## 2024-01-13 RX ADMIN — SODIUM CHLORIDE 100 ML/HR: 0.45 INJECTION, SOLUTION INTRAVENOUS at 10:06

## 2024-01-13 RX ADMIN — ACYCLOVIR SODIUM 650 MG: 1000 INJECTION, SOLUTION INTRAVENOUS at 00:40

## 2024-01-13 RX ADMIN — CHLORHEXIDINE GLUCONATE 15 ML: 1.2 SOLUTION ORAL at 20:22

## 2024-01-13 RX ADMIN — TOPIRAMATE 100 MG: 100 TABLET, FILM COATED ORAL at 09:39

## 2024-01-13 RX ADMIN — CEFTRIAXONE SODIUM 2000 MG: 10 INJECTION, POWDER, FOR SOLUTION INTRAVENOUS at 05:01

## 2024-01-13 RX ADMIN — POTASSIUM CHLORIDE 20 MEQ: 14.9 INJECTION, SOLUTION INTRAVENOUS at 10:34

## 2024-01-13 RX ADMIN — ACYCLOVIR SODIUM 650 MG: 1000 INJECTION, SOLUTION INTRAVENOUS at 12:46

## 2024-01-13 RX ADMIN — OLANZAPINE 10 MG: 10 TABLET, ORALLY DISINTEGRATING ORAL at 09:50

## 2024-01-13 RX ADMIN — SODIUM CHLORIDE 100 ML/HR: 0.45 INJECTION, SOLUTION INTRAVENOUS at 21:15

## 2024-01-13 RX ADMIN — SENNOSIDES, DOCUSATE SODIUM 2 TABLET: 8.6; 5 TABLET ORAL at 18:07

## 2024-01-13 RX ADMIN — INSULIN LISPRO 2 UNITS: 100 INJECTION, SOLUTION INTRAVENOUS; SUBCUTANEOUS at 13:10

## 2024-01-13 RX ADMIN — ENOXAPARIN SODIUM 30 MG: 30 INJECTION SUBCUTANEOUS at 09:08

## 2024-01-13 RX ADMIN — DEXAMETHASONE SODIUM PHOSPHATE 7.7 MG: 10 INJECTION, SOLUTION INTRAMUSCULAR; INTRAVENOUS at 15:54

## 2024-01-13 RX ADMIN — DEXAMETHASONE SODIUM PHOSPHATE 7.7 MG: 10 INJECTION, SOLUTION INTRAMUSCULAR; INTRAVENOUS at 04:26

## 2024-01-13 RX ADMIN — CHLORHEXIDINE GLUCONATE 15 ML: 1.2 SOLUTION ORAL at 09:58

## 2024-01-13 RX ADMIN — DORNASE ALFA 2.5 MG: 1 SOLUTION RESPIRATORY (INHALATION) at 19:26

## 2024-01-13 RX ADMIN — REMDESIVIR 100 MG: 100 INJECTION, POWDER, LYOPHILIZED, FOR SOLUTION INTRAVENOUS at 17:54

## 2024-01-13 NOTE — PROGRESS NOTES
Patient with oxygen desaturation as low as 84%.  FiO2 on high flow nasal cannula increased to 80% with 55 L flow.  Patient's last oxygen saturation 93%.  Continue to monitor patient's respiratory status.    Critical care time 25 minutes, critical care time does not include procedures or family update

## 2024-01-13 NOTE — SEPSIS NOTE
Sepsis Note   Carla Hunt 58 y.o. female MRN: [id number]  Unit/Bed#: ICU 13 Encounter: [id number]              Body mass index is 25.88 kg/m².  Wt Readings from Last 1 Encounters:   01/10/24 77.2 kg (170 lb 3.1 oz)        Ideal body weight: 63.9 kg (140 lb 14 oz)  Adjusted ideal body weight: 69.2 kg (152 lb 9.6 oz)    Patient meeting sepsis criteria by  and RR 24. Likely due to agitation and known COVID infection. Currently being treated for possible bacterial infection as well.     Patient has received broad spectrum antibiotics and is being evaluated by infectious disease. Will hold off on restarting sepsis workup at this time.

## 2024-01-13 NOTE — OCCUPATIONAL THERAPY NOTE
Occupational Therapy Cancellation Note       01/13/24 0802   Note Type   Note type Evaluation;Cancelled Session   Cancel Reasons Medical status         Orders received and chart reviewed. Per EMR, Pt not appropriate to be seen 2' medical status. Will continue to follow to be seen for OT evaluation as appropriate/when medically cleared.       Juanita Pablo MS, OTR/L

## 2024-01-13 NOTE — SPEECH THERAPY NOTE
"Speech-Language Pathology Progress Note    Patient Name: Carla Hunt    Today's Date: 1/13/2024    Subjective:  Pt was awake, alert, and confused, repeatedly asking the date and when asked moments later to recall the date responded \"Wednesday.\"  She was sitting upright in bed. Soft restraints on, daughter arrived at end of session.     Objective:  Pt was seen today for dysphagia therapy. Current diet is puree with thin liquids. Pt was on high flow O2. Oral care was completed with use of oral care kits. Focus of today's session was to maximize PO intake safety and determine potential for diet texture advancement. Textures offered today included puree, mech soft soft solid (ela cracker dipped in pudding), regular solid (pretzels), and thin liquid via straw.  Swallow function:   Bolus retrieval and control were adequate with thin via straw. Weak bite strength and mastication with hard solids. Bolus formation was not cohesive with pretzels resulting in diffuse oral residue and anterior bolus loss. Pharyngeal swallow initiation was min delayed. Hyolaryngeal excursion was adequate. No s/s aspiration occurred during session today.  SLP provided mod cueing/feedback throughout session for improved mastication and bolus control with solids. Attempted liquid wash for oral clearance however she often refused.    Assessment:  Swallow function is improving with current diet. Slight diet texture advancement is recommended at this time.  Improved alertness today however persistent confusion.     Plan:  Change diet texture to dysphagia 2 mechanical soft, continue thin liquids. Can try meds whole in apple sauce, crush if needed. Continue ST follow up. Subsequent sessions to focus on maximizing PO intake safety, determining potential for diet texture advancement, and improving pt's self monitoring.    "

## 2024-01-13 NOTE — PLAN OF CARE
Problem: SAFETY,RESTRAINT: NV/NON-SELF DESTRUCTIVE BEHAVIOR  Goal: Remains free of harm/injury (restraint for non violent/non self-detsructive behavior)  Description: INTERVENTIONS:  - Instruct patient/family regarding restraint use   - Assess and monitor physiologic and psychological status   - Provide interventions and comfort measures to meet assessed patient needs   - Identify and implement measures to help patient regain control  - Assess readiness for release of restraint   Outcome: Progressing     Problem: SAFETY ADULT  Goal: Patient will remain free of falls  Description: INTERVENTIONS:  - Educate patient/family on patient safety including physical limitations  - Instruct patient to call for assistance with activity   - Consult OT/PT to assist with strengthening/mobility   - Keep Call bell within reach  - Keep bed low and locked with side rails adjusted as appropriate  - Keep care items and personal belongings within reach  - Initiate and maintain comfort rounds  - Make Fall Risk Sign visible to staff  - Offer Toileting every 2 Hours, in advance of need  - Initiate/Maintain bed alarm  - Obtain necessary fall risk management equipment: bed alarm  - Apply yellow socks and bracelet for high fall risk patients  - Consider moving patient to room near nurses station  Outcome: Progressing     Problem: NEUROSENSORY - ADULT  Goal: Achieves stable or improved neurological status  Description: INTERVENTIONS  - Monitor and report changes in neurological status  - Monitor vital signs such as temperature, blood pressure, glucose, and any other labs ordered   - Initiate measures to prevent increased intracranial pressure  - Monitor for seizure activity and implement precautions if appropriate      Outcome: Progressing     Problem: Knowledge Deficit  Goal: Patient/family/caregiver demonstrates understanding of disease process, treatment plan, medications, and discharge instructions  Description: Complete learning  assessment and assess knowledge base.  Interventions:  - Provide teaching at level of understanding  - Provide teaching via preferred learning methods  Outcome: Progressing     Problem: Potential for Falls  Goal: Patient will remain free of falls  Description: INTERVENTIONS:  - Educate patient/family on patient safety including physical limitations  - Instruct patient to call for assistance with activity   - Consult OT/PT to assist with strengthening/mobility   - Keep Call bell within reach  - Keep bed low and locked with side rails adjusted as appropriate  - Keep care items and personal belongings within reach  - Initiate and maintain comfort rounds  - Make Fall Risk Sign visible to staff  - Offer Toileting every 2 Hours, in advance of need  - Initiate/Maintain bed alarm  - Obtain necessary fall risk management equipment: bed alarm  - Apply yellow socks and bracelet for high fall risk patients  - Consider moving patient to room near nurses station  Outcome: Progressing

## 2024-01-13 NOTE — PROGRESS NOTES
[facility]  Interval Progress Note: Critical Care  Name: Carla Hunt I  MRN: [id number]  Unit/Bed#: ICU 13 I Date of Admission: 1/10/2024   Date of Service: 1/13/2024 I Hospital Day: 3    Interval Events:        Pt with desat to low 80s on HFNC 80/50 no acute distress. HFNC increased to 100/50 with sats 90-95%.    Pt then noted to be hypotensive 80's Precedex gtt held with increase in sBP 90     Pertinent New Data:   blood pressure, pulse, respirations, and pulse oximetry      CBC:   Lab Results   Component Value Date    WBC 7.92 01/13/2024    HGB 11.6 01/13/2024    HCT 34.7 (L) 01/13/2024    MCV 86 01/13/2024     01/13/2024    RBC 4.03 01/13/2024    MCH 28.8 01/13/2024    MCHC 33.4 01/13/2024    RDW 13.5 01/13/2024    MPV 9.6 01/13/2024    NRBC 0 01/12/2024   , CMP:   Lab Results   Component Value Date    SODIUM 150 (H) 01/12/2024    K 3.3 (L) 01/12/2024     (H) 01/12/2024    CO2 21 01/12/2024    BUN 24 01/12/2024    CREATININE 1.12 01/12/2024    CALCIUM 7.8 (L) 01/12/2024    EGFR 54 01/12/2024     chest xrayI have personally reviewed pertinent reports.   and I have personally reviewed pertinent films in PACS      Assessment and Plan  Diagnosis: Acute hypoxic resp failure 2/2 PNA and COVID  Plan: Titrate HFNC for sats >88%, increase steroids to 0.1mg/kg, no actemra 2/2 bacterial infection, change lovenox to covid protocol, repeat cxr, if no improvement consider bipap    Diagnosis: Hypotension  Plan: Precedex gtt held with improvement mild, if remains t/c isolyte 500cc IVF bolus    Billing Level:  During this visit, Critical care services were medically necessary as this patient had a high probability of imminent or life-threatening deterioration due to acute respiratory failure and Hypotension , required my direct attention, intervention, and personal management to implement the following: CXR interpretation , directing of titration of sedation, fluid  management, bedside management, case discussed with consultants , direct patient care, and documentation of findings.    I have personally provided 20 minutes on 01/13/24 of critical care time, exclusive of procedures, teaching, family meetings, and any prior time recorded by providers other than myself.    SIGNATURE: DANYELLE Carrillo

## 2024-01-13 NOTE — PROGRESS NOTES
Progress Note - Infectious Disease   Carla Hunt 58 y.o. female MRN: [id number]  Unit/Bed#: ICU 13 Encounter: [id number]      Impression/Plan:  SIRS.  If patient does indeed have ongoing seizure, SIRS may be all secondary to it. Consideration for CNS infection, as in below.  Consideration for COVID as bacterial pneumonia contributing to this.  Patient is overall clinically proved and she remains systemically nontoxic.  Admission blood cultures have no growth thus far.  Antibiotic and antiviral plan as seen above.    Workup for seizure in progress.  Monitor temperature/WBC.  Follow-up on admission blood cultures.     Encephalitis versus encephalopathy.  Head CT and MRI did not show acute CVA or any mass lesion.  There is no seizure on EEG monitoring now but it is unclear whether patient may have a seizure during recent hospitalization at [facility] prior to transfer.  There was no clinical evidence of bacterial meningitis but HSV encephalitis is possible.  Also, consider encephalopathy/encephalitis from COVID.  Consider postictal state.  Patient's mental status is improved.  She is status post lumbar puncture.  Unfortunately, due to her agitation, only a small amount of CSF was obtained.  CSF protein was only 1.  Even with patient having received 2 days of antibiotic, especially given absence of signs and symptoms of bacterial meningitis, doubt that patient has bacterial meningitis with absence of CSF pleocytosis.  Will discontinue antibiotics to avoid potential antibiotic toxicities.  However, HSV encephalitis is still possibility. M/E PCR panel negative but HSV PCR in this l is not highly sensitive.  Unfortunately, there was not enough CSF to obtain HSV PCR.  Given relatively high probability, we will continue IV acyclovir for now.  No further need for antibiotic for this indication.  Will discontinue.  Continue IV acyclovir for now.  Follow-up on CSF culture.  Monitor mental status.  Await repeat  lumbar puncture for HSV PCR.     Severe COVID, with acute hypoxic respiratory failure.  Patient is currently on remdesivir and dexamethasone and did receive 1 dose of Tocilizumab.  CXR with opacities.  With elevated procalcitonin, there is concern of possible concurrent bacterial pneumonia. Patient did receive initial rounds of COVID vaccination but not the new COVID vaccination available this past fall.  Continue remdesivir and dexamethasone.  Recommend extending the course of remdesivir in the face of immunosuppression to 10 days  Continue ceftriaxone   Monitor respiratory symptoms and O2 saturation.  Recheck CRP and procalcitonin level to look for evidence of progressive inflammatory changes or response to antibiotic therapy     Acute hypoxic respiratory failure, likely multifactorial, with COVID contributing.  Respiratory status stable.  O2 support is stable, with patient remaining on high flow O2 support.  O2 support per critical service.     CKD.  Creatinine baseline.  Antibiotic and antiviral dosages adjusted accordingly.  Recheck BMP to make sure no dose adjustments needed with any of the treatments     Seizure disorder, status post temporal lobe resection in 2007.  There is no active seizure on EEG monitoring at present but it is unclear whether patient may have had seizure earlier.  Neurology follow-up.     B-cell lymphoma, on chemotherapy.  Patient is leukopenic but not neutropenic.  Monitor WBC/ANC.    Discussed with the critical care service the plan to continue the intravenous acyclovir awaiting repeat lumbar puncture.  Also discussed with them the plan to continue the ceftriaxone and to extend the course of the remdesivir in the face of immunosuppression.  They agree with the plan.    Antibiotics:  Acyclovir 4  Ceftriaxone 5  Remdesivir 4    Subjective:  Patient has no fever, chills, sweats; no nausea, vomiting, diarrhea; continues to require high flow oxygen; no pain.  She is confused and a bit  agitated and pulling off her oxygen mask.    Objective:  Vitals:  Temp:  [97.3 °F (36.3 °C)-98.3 °F (36.8 °C)] 97.9 °F (36.6 °C)  HR:  [] 94  Resp:  [15-35] 18  BP: ()/(49-92) 96/65  SpO2:  [84 %-100 %] 96 %  Temp (24hrs), Av.8 °F (36.6 °C), Min:97.3 °F (36.3 °C), Max:98.3 °F (36.8 °C)  Current: Temperature: 97.9 °F (36.6 °C)    Physical Exam:   General Appearance:  Alert, interactive, confused, on high flow oxygen   Throat: Oropharynx moist without lesions.    Lungs:   Decreased breath sounds bilaterally; no wheezes, rhonchi or rales; respirations unlabored   Heart:  RRR; no murmur, rub or gallop   Abdomen:   Soft, non-tender, non-distended, positive bowel sounds.     Extremities: No clubbing, cyanosis or edema   Skin: No new rashes or lesions. No draining wounds noted.       Labs, Imaging, & Other studies:   All pertinent labs and imaging studies were personally reviewed  Results from last 7 days   Lab Units 24  0506 24  0558 24  0454   WBC Thousand/uL 7.92 3.48* 2.91*   HEMOGLOBIN g/dL 11.6 11.4* 12.2   PLATELETS Thousands/uL 302 293 332     Results from last 7 days   Lab Units 24  0506 24  0558 24  0454 01/10/24  1030 01/10/24  0441 24  1926   SODIUM mmol/L 148* 150* 152*   < > 148* 146   POTASSIUM mmol/L 3.0* 3.3* 3.9   < > 4.0 3.6   CHLORIDE mmol/L 114* 118* 120*   < > 121* 120*   CO2 mmol/L 23 21 20*   < > 18* 18*   BUN mg/dL 18 24 26*   < > 21 20   CREATININE mg/dL 0.96 1.12 1.23   < > 1.20 1.31*   EGFR ml/min/1.73sq m 65 54 48   < > 50 45   CALCIUM mg/dL 7.5* 7.8* 9.0   < > 8.8 8.8   AST U/L  --   --  97*  --  125* 135*   ALT U/L  --   --  77*  --  87* 89*   ALK PHOS U/L  --   --  92  --  97 99    < > = values in this interval not displayed.     Results from last 7 days   Lab Units 24  0408 01/10/24  2115 01/09/24  2039 01/09/24  2035 01/07/24  2256 24   BLOOD CULTURE   --   --  No Growth at 48 hrs. No Growth at 48  hrs.  --  No Growth After 5 Days. No Growth After 5 Days.   GRAM STAIN RESULT  No No Polys or Bacteria seen  --   --   --   --   --   --    URINE CULTURE   --   --   --   --  60,000-69,000 cfu/ml Lactobacillus species*  --   --    MRSA CULTURE ONLY   --   --  No Methicillin Resistant Staphlyococcus aureus (MRSA) isolated  --   --   --   --    LEGIONELLA URINARY ANTIGEN   --  Negative  --   --   --   --   --      Results from last 7 days   Lab Units 01/11/24  0611 01/10/24  0441 01/09/24  1926 01/09/24  0335   PROCALCITONIN ng/ml 0.37* 0.60* 0.66* 0.63*     Results from last 7 days   Lab Units 01/11/24  0454 01/10/24  0441 01/09/24  1926   CRP mg/L 76.6* 110.4* 88.6*         Results from last 7 days   Lab Units 01/09/24 1926   D-DIMER QUANTITATIVE ug/ml FEU 1.98*     Chest x-ray.  Bilateral consolidation    Images personally reviewed by me in PACS

## 2024-01-14 LAB
ALBUMIN SERPL BCP-MCNC: 2.9 G/DL (ref 3.5–5)
ALP SERPL-CCNC: 102 U/L (ref 34–104)
ALT SERPL W P-5'-P-CCNC: 85 U/L (ref 7–52)
ANION GAP SERPL CALCULATED.3IONS-SCNC: 7 MMOL/L
AST SERPL W P-5'-P-CCNC: 81 U/L (ref 13–39)
ATRIAL RATE: 75 BPM
BILIRUB SERPL-MCNC: 0.55 MG/DL (ref 0.2–1)
BUN SERPL-MCNC: 15 MG/DL (ref 5–25)
CALCIUM ALBUM COR SERPL-MCNC: 8.7 MG/DL (ref 8.3–10.1)
CALCIUM SERPL-MCNC: 7.8 MG/DL (ref 8.4–10.2)
CHLORIDE SERPL-SCNC: 119 MMOL/L (ref 96–108)
CO2 SERPL-SCNC: 21 MMOL/L (ref 21–32)
CREAT SERPL-MCNC: 0.99 MG/DL (ref 0.6–1.3)
CRP SERPL QL: 21.9 MG/L
ERYTHROCYTE [DISTWIDTH] IN BLOOD BY AUTOMATED COUNT: 13.7 % (ref 11.6–15.1)
GFR SERPL CREATININE-BSD FRML MDRD: 63 ML/MIN/1.73SQ M
GLUCOSE SERPL-MCNC: 108 MG/DL (ref 65–140)
GLUCOSE SERPL-MCNC: 112 MG/DL (ref 65–140)
GLUCOSE SERPL-MCNC: 135 MG/DL (ref 65–140)
GLUCOSE SERPL-MCNC: 150 MG/DL (ref 65–140)
GLUCOSE SERPL-MCNC: 212 MG/DL (ref 65–140)
GLUCOSE SERPL-MCNC: 214 MG/DL (ref 65–140)
HCT VFR BLD AUTO: 33.8 % (ref 34.8–46.1)
HGB BLD-MCNC: 10.8 G/DL (ref 11.5–15.4)
MAGNESIUM SERPL-MCNC: 2 MG/DL (ref 1.9–2.7)
MCH RBC QN AUTO: 28.4 PG (ref 26.8–34.3)
MCHC RBC AUTO-ENTMCNC: 32 G/DL (ref 31.4–37.4)
MCV RBC AUTO: 89 FL (ref 82–98)
P AXIS: 44 DEGREES
PHOSPHATE SERPL-MCNC: 1.9 MG/DL (ref 2.7–4.5)
PLATELET # BLD AUTO: 271 THOUSANDS/UL (ref 149–390)
PMV BLD AUTO: 9.6 FL (ref 8.9–12.7)
POTASSIUM SERPL-SCNC: 3.2 MMOL/L (ref 3.5–5.3)
PR INTERVAL: 167 MS
PROCALCITONIN SERPL-MCNC: 0.32 NG/ML
PROT SERPL-MCNC: 4.6 G/DL (ref 6.4–8.4)
QRS AXIS: 29 DEGREES
QRSD INTERVAL: 79 MS
QT INTERVAL: 392 MS
QTC INTERVAL: 438 MS
RBC # BLD AUTO: 3.8 MILLION/UL (ref 3.81–5.12)
SODIUM SERPL-SCNC: 147 MMOL/L (ref 135–147)
T WAVE AXIS: 25 DEGREES
VENTRICULAR RATE: 75 BPM
WBC # BLD AUTO: 9.04 THOUSAND/UL (ref 4.31–10.16)

## 2024-01-14 PROCEDURE — 94640 AIRWAY INHALATION TREATMENT: CPT

## 2024-01-14 PROCEDURE — 82948 REAGENT STRIP/BLOOD GLUCOSE: CPT

## 2024-01-14 PROCEDURE — 94664 DEMO&/EVAL PT USE INHALER: CPT

## 2024-01-14 PROCEDURE — 94760 N-INVAS EAR/PLS OXIMETRY 1: CPT

## 2024-01-14 PROCEDURE — 84145 PROCALCITONIN (PCT): CPT | Performed by: INTERNAL MEDICINE

## 2024-01-14 PROCEDURE — 80053 COMPREHEN METABOLIC PANEL: CPT | Performed by: INTERNAL MEDICINE

## 2024-01-14 PROCEDURE — 99233 SBSQ HOSP IP/OBS HIGH 50: CPT | Performed by: INTERNAL MEDICINE

## 2024-01-14 PROCEDURE — 93010 ELECTROCARDIOGRAM REPORT: CPT | Performed by: INTERNAL MEDICINE

## 2024-01-14 PROCEDURE — 84100 ASSAY OF PHOSPHORUS: CPT | Performed by: NURSE PRACTITIONER

## 2024-01-14 PROCEDURE — 95720 EEG PHY/QHP EA INCR W/VEEG: CPT | Performed by: PSYCHIATRY & NEUROLOGY

## 2024-01-14 PROCEDURE — 86140 C-REACTIVE PROTEIN: CPT | Performed by: INTERNAL MEDICINE

## 2024-01-14 PROCEDURE — 93005 ELECTROCARDIOGRAM TRACING: CPT

## 2024-01-14 PROCEDURE — 99291 CRITICAL CARE FIRST HOUR: CPT | Performed by: EMERGENCY MEDICINE

## 2024-01-14 PROCEDURE — 85027 COMPLETE CBC AUTOMATED: CPT | Performed by: INTERNAL MEDICINE

## 2024-01-14 PROCEDURE — 83735 ASSAY OF MAGNESIUM: CPT | Performed by: NURSE PRACTITIONER

## 2024-01-14 RX ORDER — POTASSIUM CHLORIDE 20 MEQ/1
40 TABLET, EXTENDED RELEASE ORAL ONCE
Status: COMPLETED | OUTPATIENT
Start: 2024-01-14 | End: 2024-01-14

## 2024-01-14 RX ORDER — POTASSIUM CHLORIDE 29.8 MG/ML
40 INJECTION INTRAVENOUS ONCE
Qty: 100 ML | Refills: 0 | Status: DISCONTINUED | OUTPATIENT
Start: 2024-01-14 | End: 2024-01-14

## 2024-01-14 RX ORDER — POTASSIUM CHLORIDE 14.9 MG/ML
20 INJECTION INTRAVENOUS
Status: DISCONTINUED | OUTPATIENT
Start: 2024-01-14 | End: 2024-01-14

## 2024-01-14 RX ADMIN — POTASSIUM & SODIUM PHOSPHATES POWDER PACK 280-160-250 MG 1 PACKET: 280-160-250 PACK at 12:56

## 2024-01-14 RX ADMIN — ACYCLOVIR SODIUM 650 MG: 1000 INJECTION, SOLUTION INTRAVENOUS at 13:50

## 2024-01-14 RX ADMIN — OLANZAPINE 10 MG: 10 TABLET, ORALLY DISINTEGRATING ORAL at 21:43

## 2024-01-14 RX ADMIN — DEXMEDETOMIDINE HYDROCHLORIDE 0.4 MCG/KG/HR: 4 INJECTION, SOLUTION INTRAVENOUS at 10:39

## 2024-01-14 RX ADMIN — ENOXAPARIN SODIUM 30 MG: 30 INJECTION SUBCUTANEOUS at 22:22

## 2024-01-14 RX ADMIN — DEXMEDETOMIDINE HYDROCHLORIDE 1.2 MCG/KG/HR: 4 INJECTION, SOLUTION INTRAVENOUS at 22:00

## 2024-01-14 RX ADMIN — SODIUM CHLORIDE 100 ML/HR: 0.45 INJECTION, SOLUTION INTRAVENOUS at 17:08

## 2024-01-14 RX ADMIN — POTASSIUM CHLORIDE 20 MEQ: 14.9 INJECTION, SOLUTION INTRAVENOUS at 10:40

## 2024-01-14 RX ADMIN — CHLORHEXIDINE GLUCONATE 15 ML: 1.2 SOLUTION ORAL at 11:04

## 2024-01-14 RX ADMIN — CHLORHEXIDINE GLUCONATE 15 ML: 1.2 SOLUTION ORAL at 21:42

## 2024-01-14 RX ADMIN — TOPIRAMATE 100 MG: 100 TABLET, FILM COATED ORAL at 17:50

## 2024-01-14 RX ADMIN — INSULIN LISPRO 1 UNITS: 100 INJECTION, SOLUTION INTRAVENOUS; SUBCUTANEOUS at 22:23

## 2024-01-14 RX ADMIN — DEXAMETHASONE SODIUM PHOSPHATE 7.7 MG: 10 INJECTION, SOLUTION INTRAMUSCULAR; INTRAVENOUS at 15:11

## 2024-01-14 RX ADMIN — VENLAFAXINE 25 MG: 25 TABLET ORAL at 11:04

## 2024-01-14 RX ADMIN — VENLAFAXINE 25 MG: 25 TABLET ORAL at 08:19

## 2024-01-14 RX ADMIN — DORNASE ALFA 2.5 MG: 1 SOLUTION RESPIRATORY (INHALATION) at 08:29

## 2024-01-14 RX ADMIN — LAMOTRIGINE 150 MG: 100 TABLET ORAL at 22:00

## 2024-01-14 RX ADMIN — INSULIN LISPRO 2 UNITS: 100 INJECTION, SOLUTION INTRAVENOUS; SUBCUTANEOUS at 12:54

## 2024-01-14 RX ADMIN — VENLAFAXINE 25 MG: 25 TABLET ORAL at 17:50

## 2024-01-14 RX ADMIN — ACYCLOVIR SODIUM 650 MG: 1000 INJECTION, SOLUTION INTRAVENOUS at 01:25

## 2024-01-14 RX ADMIN — OLANZAPINE 10 MG: 10 TABLET, ORALLY DISINTEGRATING ORAL at 08:26

## 2024-01-14 RX ADMIN — TOPIRAMATE 100 MG: 100 TABLET, FILM COATED ORAL at 11:03

## 2024-01-14 RX ADMIN — POTASSIUM CHLORIDE 40 MEQ: 1500 TABLET, EXTENDED RELEASE ORAL at 12:56

## 2024-01-14 RX ADMIN — REMDESIVIR 100 MG: 100 INJECTION, POWDER, LYOPHILIZED, FOR SOLUTION INTRAVENOUS at 17:37

## 2024-01-14 RX ADMIN — DEXAMETHASONE SODIUM PHOSPHATE 7.7 MG: 10 INJECTION, SOLUTION INTRAMUSCULAR; INTRAVENOUS at 04:15

## 2024-01-14 RX ADMIN — ENOXAPARIN SODIUM 30 MG: 30 INJECTION SUBCUTANEOUS at 08:28

## 2024-01-14 RX ADMIN — POLYETHYLENE GLYCOL 3350 17 G: 17 POWDER, FOR SOLUTION ORAL at 11:04

## 2024-01-14 RX ADMIN — CEFTRIAXONE SODIUM 2000 MG: 10 INJECTION, POWDER, FOR SOLUTION INTRAVENOUS at 05:49

## 2024-01-14 RX ADMIN — INSULIN LISPRO 1 UNITS: 100 INJECTION, SOLUTION INTRAVENOUS; SUBCUTANEOUS at 08:16

## 2024-01-14 RX ADMIN — DORNASE ALFA 2.5 MG: 1 SOLUTION RESPIRATORY (INHALATION) at 19:19

## 2024-01-14 RX ADMIN — LAMOTRIGINE 150 MG: 100 TABLET ORAL at 11:03

## 2024-01-14 RX ADMIN — SENNOSIDES, DOCUSATE SODIUM 2 TABLET: 8.6; 5 TABLET ORAL at 08:20

## 2024-01-14 RX ADMIN — SODIUM CHLORIDE 100 ML/HR: 0.45 INJECTION, SOLUTION INTRAVENOUS at 07:15

## 2024-01-14 RX ADMIN — SENNOSIDES, DOCUSATE SODIUM 2 TABLET: 8.6; 5 TABLET ORAL at 17:50

## 2024-01-14 NOTE — PROGRESS NOTES
[facility]  Progress Note: Critical Care  Name: Carla Hunt 58 y.o. female I MRN: [id number]  Unit/Bed#: ICU 13 I Date of Admission: 1/10/2024   Date of Service: 1/14/2024 I Hospital Day: 4    Assessment/Plan   Neuro:   Diagnosis: Acute Encephalopathy   ? Covid vs subclinical seizures (stare) vs CNS  MRI-Previously visualized diffusion signal abnormality in the right basal ganglia is no longer identified and may have been artifactual given underlying susceptibility artifact in the bilateral globus pallidus.   LP-cx/gm stain neg, ME panel neg, WBC 1  Plan:   Precedex 0.2  Zyprexa--consider transitioning back to home meds below  Per ID cont acyclovir and attempt LP HSV when appropriate per IR may need anesthesia  Diagnosis: Hx Seizure dx s/p [id number]  Plan:            vEEG no seizures d/c 1/13  Home lamictal/Topamax  Diagnosis: Depression/anxiety  Plan:   Home buspar/lexapro held  On home effexor   CV:   No active issues     Pulm:  Diagnosis: Acute hypoxic resp failure 2/2 multifocal pna/covid  CXR-mild improvement in L infiltrates  BC 1/9 NG, leg/strep/MRSA neg  PCT 0.6-0.3-trend  Plan:   Wean HFNC 80/40 for sats>92%  Ceftx D6/7  Diagnosis: Covid 19  Actemra 1/9  -[id number]  Plan:   Severe Pathway  Steroids increased 1/13 to 0.1mg/kg D2/10  Remdesivir D6/10  Dornase  Enoxaparin BID  GI:   Diagnosis: Dysphagia  Plan: Speech following, puree     :   No active issues     F/E/N:   Diagnosis: Hypernatremia/Hypokalemia/hypophos  Plan:   Replete lytes  T/c changing IVF to D5 1/4NSS     Heme/Onc:   Diagnosis: Hx B Cell lymphoma w bone mets on chemo  Plan: outpt f/u  maintenance rituxan hycela until 5/2024 plan     Endo:   No active issues     ID:   See pulm. Bandemia 8 on am labs 1/13. F/u am diff. If spikes temp recx     MSK/Skin:   No active issues    Disposition: Stepdown Level 1    ICU Core Measures     A: Assess, Prevent, and Manage Pain Has pain  been assessed? Yes  Need for changes to pain regimen? No   B: Both SAT/SAT  N/A   C: Choice of Sedation RASS Goal: 0 Alert and Calm  Need for changes to sedation or analgesia regimen? No   D: Delirium CAM-ICU: Positive   E: Early Mobility  Plan for early mobility? Yes   F: Family Engagement Plan for family engagement today? Yes       Antibiotic Review: Infectious disease consulted    Review of Invasive Devices:            Prophylaxis:  VTE VTE covered by:  enoxaparin, Subcutaneous, 30 mg at 01/13/24 2021       Stress Ulcer  not ordered        Significant 24hr Events     24hr events: HFNC weaned to 80/40     Subjective     Review of Systems   Unable to perform ROS: Acuity of condition   Respiratory: Negative.     Cardiovascular: Negative.    Gastrointestinal: Negative.    Genitourinary: Negative.    Neurological: Negative.         Objective                            Vitals I/O      Most Recent Min/Max in 24hrs   Temp 99 °F (37.2 °C) Temp  Min: 98.5 °F (36.9 °C)  Max: 99.3 °F (37.4 °C)   Pulse 62 Pulse  Min: 58  Max: 112   Resp (!) 23 Resp  Min: 17  Max: 25   BP 99/54 BP  Min: 92/49  Max: 137/76   O2 Sat 92 % SpO2  Min: 87 %  Max: 100 %      Intake/Output Summary (Last 24 hours) at 1/14/2024 0439  Last data filed at 1/14/2024 0225  Gross per 24 hour   Intake 3149.43 ml   Output 2419 ml   Net 730.43 ml       Diet Dysphagia/Modified Consistency; Dysphagia 2-Mechanical Soft; Thin Liquid    Invasive Monitoring           Physical Exam   Physical Exam  Eyes:      Pupils: Pupils are equal, round, and reactive to light.   Skin:     General: Skin is warm and dry.   HENT:      Head: Normocephalic and atraumatic.      Mouth/Throat:      Mouth: Mucous membranes are moist.   Neck:      Vascular: Central line present.   Cardiovascular:      Rate and Rhythm: Normal rate and regular rhythm.      Pulses: Normal pulses.      Heart sounds: Normal heart sounds.   Musculoskeletal:         General: No swelling. Normal range of motion.       Right lower leg: No edema.      Left lower leg: No edema.   Abdominal: General: Bowel sounds are normal. There is no distension.      Palpations: Abdomen is soft.   Constitutional:       Appearance: She is ill-appearing.   Pulmonary:      Effort: Pulmonary effort is normal.      Breath sounds: Normal breath sounds.   Neurological:      General: No focal deficit present.      Mental Status: She is alert. She is disoriented to person and disoriented to place.      Motor: Strength full and intact in all extremities. No motor deficit.      Comments: Word salad            Diagnostic Studies      EKG: tele  Imaging:  I have personally reviewed pertinent reports.   and I have personally reviewed pertinent films in PACS     Medications:  Scheduled PRN   acyclovir, 10 mg/kg (Ideal), Q12H  cefTRIAXone, 2,000 mg, Q24H  chlorhexidine, 15 mL, Q12H SARTHAK  dexamethasone, 0.1 mg/kg, Q12H  dornase natacha, 2.5 mg, BID  enoxaparin, 30 mg, Q12H SARTHAK  insulin lispro, 1-5 Units, HS  insulin lispro, 1-6 Units, TID AC  lamoTRIgine, 150 mg, BID  OLANZapine, 10 mg, BID  polyethylene glycol, 17 g, Daily  remdesivir, 100 mg, Q24H  senna-docusate sodium, 2 tablet, BID  topiramate, 100 mg, BID  venlafaxine, 25 mg, TID With Meals      acetaminophen, 650 mg, Q6H PRN  bisacodyl, 10 mg, Daily PRN  ondansetron, 4 mg, Q6H PRN       Continuous    dexmedetomidine, 0.1-1.5 mcg/kg/hr, Last Rate: 0.4 mcg/kg/hr (01/14/24 0037)  sodium chloride, 100 mL/hr, Last Rate: 100 mL/hr (01/13/24 2115)         Labs:    CBC    Recent Labs     01/12/24  0558 01/13/24  0506   WBC 3.48* 7.92   HGB 11.4* 11.6   HCT 34.2* 34.7*    302   BANDSPCT  --  8     BMP    Recent Labs     01/12/24  0558 01/13/24  0506   SODIUM 150* 148*   K 3.3* 3.0*   * 114*   CO2 21 23   AGAP 11 11   BUN 24 18   CREATININE 1.12 0.96   CALCIUM 7.8* 7.5*       Coags    Recent Labs     01/12/24  0558   PTT 32        Additional Electrolytes  Recent Labs     01/12/24  0558 01/13/24  0506   MG  1.7* 2.0   PHOS 2.5* 2.5*          Blood Gas    No recent results  No recent results LFTs  No recent results    Infectious  No recent results  Glucose  Recent Labs     01/12/24  0558 01/13/24  0506   GLUC 154* 120               Non-Critical Care Time Statement: I have spent a total time of 30 minutes in caring for this patient including Diagnostic results, Documenting in the medical record, and Reviewing / ordering tests, medicine, procedures  .     DANYELLE Carrillo

## 2024-01-14 NOTE — PROGRESS NOTES
[facility]  Progress Note: Critical Care  Name: Carla Hunt 58 y.o. female I MRN: [id number]  Unit/Bed#: ICU 13 I Date of Admission: 1/10/2024   Date of Service: 1/14/2024 I Hospital Day: 4    Assessment/Plan   Neuro:   Diagnosis: Acute Encephalopathy   ? Covid vs subclinical seizures (stare) vs CNS  MRI-Previously visualized diffusion signal abnormality in the right basal ganglia is no longer identified and may have been artifactual given underlying susceptibility artifact in the bilateral globus pallidus.   LP-cx/gm stain neg, ME panel neg, WBC 1  Plan:   Precedex d/c with hypotension  Zyprexa--consider transitioning back to home meds below  Per ID cont acyclovir and attempt LP HSV when appropriate per IR may need anesthesia  Diagnosis: Hx Seizure dx s/p Temporal lobectomy [id number]  Plan:    vEEG no seizures  Home lamictal/Topamax  Diagnosis: Depression/anxiety  Plan:   Home buspar/lexapro held  On home effexor   CV:   Diagnosis: Hypotension  Plan:   Improved with precedex gtt d/c    Pulm:  Diagnosis: Acute hypoxic resp failure 2/2 multifocal pna/covid  CXR-mild improvement in L infiltrates  BC 1/9 NG, leg/strep/MRSA neg  PCT 0.6-0.3  Plan:   Wean HFNC 100/50 for sats>88%  Ceftx D5/7  Diagnosis: Covid 19  Actemra 1/9  -[id number]  Plan:   Severe Pathway  Steroids increased to 0.1mg/kg D1/10  Remdesivir D5/10  Dornase  Enoxaparin BID  GI:   Diagnosis: Dysphagia  Plan: Speech following, puree    :   No active issues    F/E/N:   Diagnosis: Hypernatremia/Hypokalemia/hypophos  Plan:   Replete lytes  T/c changing IVF to D5 1/4NSS    Heme/Onc:   Diagnosis: Hx B Cell lymphoma w bone mets on chemo  Plan: outpt f/u  maintenance rituxan hycela until 5/2024 plan    Endo:   No active issues    ID:   See pulm. Bandemia 8 on am labs. If spikes temp recx    MSK/Skin:   No active issues    Disposition: Stepdown Level 1    ICU Core Measures     A: Assess, Prevent, and Manage  Pain Has pain been assessed? Yes  Need for changes to pain regimen? No   B: Both SAT/SAT  N/A   C: Choice of Sedation RASS Goal: 0 Alert and Calm  Need for changes to sedation or analgesia regimen? No   D: Delirium CAM-ICU: Positive   E: Early Mobility  Plan for early mobility? Yes   F: Family Engagement Plan for family engagement today? Yes       Antibiotic Review: Infectious disease consulted    Review of Invasive Devices:            Prophylaxis:  VTE VTE covered by:  enoxaparin, Subcutaneous, 30 mg at 01/13/24 2021       Stress Ulcer  not ordered        Significant 24hr Events     24hr events: HFNC 100/50 increased with desat low 80s. CXR mild improvement. Steroids increased. Hypotensive and precedex gtt d/c     Subjective     Review of Systems   Unable to perform ROS: Acuity of condition   Respiratory: Negative.     Cardiovascular: Negative.    Gastrointestinal:  Negative for nausea and vomiting.   Neurological:  Negative for headaches.        Objective                            Vitals I/O      Most Recent Min/Max in 24hrs   Temp 98.9 °F (37.2 °C) Temp  Min: 97.9 °F (36.6 °C)  Max: 99.3 °F (37.4 °C)   Pulse 62 Pulse  Min: 62  Max: 112   Resp 21 Resp  Min: 17  Max: 25   /63 BP  Min: 86/50  Max: 137/76   O2 Sat 91 % SpO2  Min: 84 %  Max: 100 %      Intake/Output Summary (Last 24 hours) at 1/14/2024 0018  Last data filed at 1/13/2024 2000  Gross per 24 hour   Intake 3003.79 ml   Output 2419 ml   Net 584.79 ml       Diet Dysphagia/Modified Consistency; Dysphagia 2-Mechanical Soft; Thin Liquid    Invasive Monitoring           Physical Exam   Physical Exam  Eyes:      Pupils: Pupils are equal, round, and reactive to light.   Skin:     General: Skin is warm and dry.   HENT:      Head: Normocephalic and atraumatic.      Mouth/Throat:      Mouth: Mucous membranes are moist.   Cardiovascular:      Rate and Rhythm: Normal rate and regular rhythm.      Pulses: Normal pulses.      Heart sounds: Normal heart sounds.    Musculoskeletal:         General: No swelling. Normal range of motion.      Right lower leg: No edema.      Left lower leg: No edema.   Abdominal: General: Bowel sounds are normal. There is no distension.      Palpations: Abdomen is soft.   Constitutional:       Appearance: She is ill-appearing.   Pulmonary:      Effort: Pulmonary effort is normal.      Comments: coarse  Neurological:      General: No focal deficit present.      Cranial Nerves: No facial asymmetry.      Motor: Strength full and intact in all extremities. No motor deficit.      Comments: Word salad            Diagnostic Studies      EKG: pending  Imaging:  I have personally reviewed pertinent reports.   and I have personally reviewed pertinent films in PACS     Medications:  Scheduled PRN   acyclovir, 10 mg/kg (Ideal), Q12H  atorvastatin, 20 mg, Daily With Dinner  cefTRIAXone, 2,000 mg, Q24H  chlorhexidine, 15 mL, Q12H SARTHAK  dexamethasone, 0.1 mg/kg, Q12H  dornase natacha, 2.5 mg, BID  enoxaparin, 30 mg, Q12H SARTHAK  insulin lispro, 1-5 Units, HS  insulin lispro, 1-6 Units, TID AC  lamoTRIgine, 150 mg, BID  OLANZapine, 10 mg, BID  polyethylene glycol, 17 g, Daily  remdesivir, 100 mg, Q24H  senna-docusate sodium, 2 tablet, BID  topiramate, 100 mg, BID  venlafaxine, 25 mg, TID With Meals      acetaminophen, 650 mg, Q6H PRN  bisacodyl, 10 mg, Daily PRN  ondansetron, 4 mg, Q6H PRN       Continuous    dexmedetomidine, 0.1-1.5 mcg/kg/hr, Last Rate: 0.2 mcg/kg/hr (01/13/24 2006)  sodium chloride, 100 mL/hr, Last Rate: 100 mL/hr (01/13/24 2115)         Labs:    CBC    Recent Labs     01/12/24  0558 01/13/24  0506   WBC 3.48* 7.92   HGB 11.4* 11.6   HCT 34.2* 34.7*    302   BANDSPCT  --  8     BMP    Recent Labs     01/12/24  0558 01/13/24  0506   SODIUM 150* 148*   K 3.3* 3.0*   * 114*   CO2 21 23   AGAP 11 11   BUN 24 18   CREATININE 1.12 0.96   CALCIUM 7.8* 7.5*       Maribeth    Recent Labs     01/12/24  0558   PTT 32        Additional  Electrolytes  Recent Labs     01/12/24  0558 01/13/24  0506   MG 1.7* 2.0   PHOS 2.5* 2.5*          Blood Gas    No recent results  No recent results LFTs  No recent results    Infectious  No recent results  Glucose  Recent Labs     01/12/24  0558 01/13/24  0506   GLUC 154* 120               Non-Critical Care Time Statement: I have spent a total time of 45 minutes in caring for this patient including Diagnostic results, Documenting in the medical record, and Reviewing / ordering tests, medicine, procedures  .     DANYELLE Carrillo

## 2024-01-14 NOTE — PLAN OF CARE
Problem: Prexisting or High Potential for Compromised Skin Integrity  Goal: Skin integrity is maintained or improved  Description: INTERVENTIONS:  - Identify patients at risk for skin breakdown  - Assess and monitor skin integrity  - Assess and monitor nutrition and hydration status  - Monitor labs   - Assess for incontinence   - Turn and reposition patient  - Assist with mobility/ambulation  - Relieve pressure over bony prominences  - Avoid friction and shearing  - Provide appropriate hygiene as needed including keeping skin clean and dry  - Evaluate need for skin moisturizer/barrier cream  - Collaborate with interdisciplinary team   - Patient/family teaching  - Consider wound care consult   Outcome: Progressing     Problem: Nutrition/Hydration-ADULT  Goal: Nutrient/Hydration intake appropriate for improving, restoring or maintaining nutritional needs  Description: Monitor and assess patient's nutrition/hydration status for malnutrition. Collaborate with interdisciplinary team and initiate plan and interventions as ordered.  Monitor patient's weight and dietary intake as ordered or per policy. Utilize nutrition screening tool and intervene as necessary. Determine patient's food preferences and provide high-protein, high-caloric foods as appropriate.     INTERVENTIONS:  - Monitor oral intake, urinary output, labs, and treatment plans  - Assess nutrition and hydration status and recommend course of action  - Evaluate amount of meals eaten  - Assist patient with eating if necessary   - Allow adequate time for meals  - Recommend/ encourage appropriate diets, oral nutritional supplements, and vitamin/mineral supplements  - Order, calculate, and assess calorie counts as needed  - Recommend, monitor, and adjust tube feedings and TPN/PPN based on assessed needs  - Assess need for intravenous fluids  - Provide specific nutrition/hydration education as appropriate  - Include patient/family/caregiver in decisions related to  nutrition  Outcome: Progressing     Problem: SAFETY,RESTRAINT: NV/NON-SELF DESTRUCTIVE BEHAVIOR  Goal: Remains free of harm/injury (restraint for non violent/non self-detsructive behavior)  Description: INTERVENTIONS:  - Instruct patient/family regarding restraint use   - Assess and monitor physiologic and psychological status   - Provide interventions and comfort measures to meet assessed patient needs   - Identify and implement measures to help patient regain control  - Assess readiness for release of restraint   Outcome: Progressing  Goal: Returns to optimal restraint-free functioning  Description: INTERVENTIONS:  - Assess the patient's behavior and symptoms that indicate continued need for restraint  - Identify and implement measures to help patient regain control  - Assess readiness for release of restraint   Outcome: Progressing     Problem: PAIN - ADULT  Goal: Verbalizes/displays adequate comfort level or baseline comfort level  Description: Interventions:  - Encourage patient to monitor pain and request assistance  - Assess pain using appropriate pain scale  - Administer analgesics based on type and severity of pain and evaluate response  - Implement non-pharmacological measures as appropriate and evaluate response  - Consider cultural and social influences on pain and pain management  - Notify physician/advanced practitioner if interventions unsuccessful or patient reports new pain  Outcome: Progressing     Problem: INFECTION - ADULT  Goal: Absence or prevention of progression during hospitalization  Description: INTERVENTIONS:  - Assess and monitor for signs and symptoms of infection  - Monitor lab/diagnostic results  - Monitor all insertion sites, i.e. indwelling lines, tubes, and drains  - Monitor endotracheal if appropriate and nasal secretions for changes in amount and color  - Lyman appropriate cooling/warming therapies per order  - Administer medications as ordered  - Instruct and encourage  patient and family to use good hand hygiene technique  - Identify and instruct in appropriate isolation precautions for identified infection/condition  Outcome: Progressing  Goal: Absence of fever/infection during neutropenic period  Description: INTERVENTIONS:  - Monitor WBC    Outcome: Progressing     Problem: SAFETY ADULT  Goal: Patient will remain free of falls  Description: INTERVENTIONS:  - Educate patient/family on patient safety including physical limitations  - Instruct patient to call for assistance with activity   - Consult OT/PT to assist with strengthening/mobility   - Keep Call bell within reach  - Keep bed low and locked with side rails adjusted as appropriate  - Keep care items and personal belongings within reach  - Initiate and maintain comfort rounds  - Make Fall Risk Sign visible to staff  - Offer Toileting every 2 Hours, in advance of need  - Initiate/Maintain bed alarm  - Obtain necessary fall risk management equipment: bed alarm  - Apply yellow socks and bracelet for high fall risk patients  - Consider moving patient to room near nurses station  Outcome: Progressing  Goal: Maintain or return to baseline ADL function  Description: INTERVENTIONS:  -  Assess patient's ability to carry out ADLs; assess patient's baseline for ADL function and identify physical deficits which impact ability to perform ADLs (bathing, care of mouth/teeth, toileting, grooming, dressing, etc.)  - Assess/evaluate cause of self-care deficits   - Assess range of motion  - Assess patient's mobility; develop plan if impaired  - Assess patient's need for assistive devices and provide as appropriate  - Encourage maximum independence but intervene and supervise when necessary  - Involve family in performance of ADLs  - Assess for home care needs following discharge   - Consider OT consult to assist with ADL evaluation and planning for discharge  - Provide patient education as appropriate  Outcome: Progressing  Goal:  Maintains/Returns to pre admission functional level  Description: INTERVENTIONS:  - Perform AM-PAC 6 Click Basic Mobility/ Daily Activity assessment daily.  - Set and communicate daily mobility goal to care team and patient/family/caregiver.   - Collaborate with rehabilitation services on mobility goals if consulted  - Perform Range of Motion 3 times a day.  - Reposition patient every 2 hours.  - Dangle patient 3 times a day  - Stand patient 3 times a day  - Ambulate patient 3 times a day  - Out of bed to chair 3 times a day   - Out of bed for meals 3 times a day  - Out of bed for toileting  - Record patient progress and toleration of activity level   Outcome: Progressing     Problem: DISCHARGE PLANNING  Goal: Discharge to home or other facility with appropriate resources  Description: INTERVENTIONS:  - Identify barriers to discharge w/patient and caregiver  - Arrange for needed discharge resources and transportation as appropriate  - Identify discharge learning needs (meds, wound care, etc.)  - Arrange for interpretive services to assist at discharge as needed  - Refer to Case Management Department for coordinating discharge planning if the patient needs post-hospital services based on physician/advanced practitioner order or complex needs related to functional status, cognitive ability, or social support system  Outcome: Progressing     Problem: Knowledge Deficit  Goal: Patient/family/caregiver demonstrates understanding of disease process, treatment plan, medications, and discharge instructions  Description: Complete learning assessment and assess knowledge base.  Interventions:  - Provide teaching at level of understanding  - Provide teaching via preferred learning methods  Outcome: Progressing     Problem: NEUROSENSORY - ADULT  Goal: Achieves stable or improved neurological status  Description: INTERVENTIONS  - Monitor and report changes in neurological status  - Monitor vital signs such as temperature, blood  pressure, glucose, and any other labs ordered   - Initiate measures to prevent increased intracranial pressure  - Monitor for seizure activity and implement precautions if appropriate      Outcome: Progressing  Goal: Remains free of injury related to seizures activity  Description: INTERVENTIONS  - Maintain airway, patient safety  and administer oxygen as ordered  - Monitor patient for seizure activity, document and report duration and description of seizure to physician/advanced practitioner  - If seizure occurs,  ensure patient safety during seizure  - Reorient patient post seizure  - Seizure pads on all 4 side rails  - Instruct patient/family to notify RN of any seizure activity including if an aura is experienced  - Instruct patient/family to call for assistance with activity based on nursing assessment  - Administer anti-seizure medications if ordered    Outcome: Progressing  Goal: Achieves maximal functionality and self care  Description: INTERVENTIONS  - Monitor swallowing and airway patency with patient fatigue and changes in neurological status  - Encourage and assist patient to increase activity and self care.   - Encourage visually impaired, hearing impaired and aphasic patients to use assistive/communication devices  Outcome: Progressing     Problem: CARDIOVASCULAR - ADULT  Goal: Maintains optimal cardiac output and hemodynamic stability  Description: INTERVENTIONS:  - Monitor I/O, vital signs and rhythm  - Monitor for S/S and trends of decreased cardiac output  - Administer and titrate ordered vasoactive medications to optimize hemodynamic stability  - Assess quality of pulses, skin color and temperature  - Assess for signs of decreased coronary artery perfusion  - Instruct patient to report change in severity of symptoms  Outcome: Progressing  Goal: Absence of cardiac dysrhythmias or at baseline rhythm  Description: INTERVENTIONS:  - Continuous cardiac monitoring, vital signs, obtain 12 lead EKG if  ordered  - Administer antiarrhythmic and heart rate control medications as ordered  - Monitor electrolytes and administer replacement therapy as ordered  Outcome: Progressing     Problem: RESPIRATORY - ADULT  Goal: Achieves optimal ventilation and oxygenation  Description: INTERVENTIONS:  - Assess for changes in respiratory status  - Assess for changes in mentation and behavior  - Position to facilitate oxygenation and minimize respiratory effort  - Oxygen administered by appropriate delivery if ordered  - Initiate smoking cessation education as indicated  - Encourage broncho-pulmonary hygiene including cough, deep breathe, Incentive Spirometry  - Assess the need for suctioning and aspirate as needed  - Assess and instruct to report SOB or any respiratory difficulty  - Respiratory Therapy support as indicated  Outcome: Progressing     Problem: GASTROINTESTINAL - ADULT  Goal: Maintains or returns to baseline bowel function  Description: INTERVENTIONS:  - Assess bowel function  - Encourage oral fluids to ensure adequate hydration  - Administer IV fluids if ordered to ensure adequate hydration  - Administer ordered medications as needed  - Encourage mobilization and activity  - Consider nutritional services referral to assist patient with adequate nutrition and appropriate food choices  Outcome: Progressing  Goal: Maintains adequate nutritional intake  Description: INTERVENTIONS:  - Monitor percentage of each meal consumed  - Identify factors contributing to decreased intake, treat as appropriate  - Assist with meals as needed  - Monitor I&O, weight, and lab values if indicated  - Obtain nutrition services referral as needed  Outcome: Progressing     Problem: GENITOURINARY - ADULT  Goal: Maintains or returns to baseline urinary function  Description: INTERVENTIONS:  - Assess urinary function  - Encourage oral fluids to ensure adequate hydration if ordered  - Administer IV fluids as ordered to ensure adequate  hydration  - Administer ordered medications as needed  - Offer frequent toileting  - Follow urinary retention protocol if ordered  Outcome: Progressing  Goal: Absence of urinary retention  Description: INTERVENTIONS:  - Assess patient’s ability to void and empty bladder  - Monitor I/O  - Bladder scan as needed  - Discuss with physician/AP medications to alleviate retention as needed  - Discuss catheterization for long term situations as appropriate  Outcome: Progressing     Problem: METABOLIC, FLUID AND ELECTROLYTES - ADULT  Goal: Electrolytes maintained within normal limits  Description: INTERVENTIONS:  - Monitor labs and assess patient for signs and symptoms of electrolyte imbalances  - Administer electrolyte replacement as ordered  - Monitor response to electrolyte replacements, including repeat lab results as appropriate  - Instruct patient on fluid and nutrition as appropriate  Outcome: Progressing  Goal: Fluid balance maintained  Description: INTERVENTIONS:  - Monitor labs   - Monitor I/O and WT  - Instruct patient on fluid and nutrition as appropriate  - Assess for signs & symptoms of volume excess or deficit  Outcome: Progressing     Problem: HEMATOLOGIC - ADULT  Goal: Maintains hematologic stability  Description: INTERVENTIONS  - Assess for signs and symptoms of bleeding or hemorrhage  - Monitor labs  - Administer supportive blood products/factors as ordered and appropriate  Outcome: Progressing     Problem: MUSCULOSKELETAL - ADULT  Goal: Maintain or return mobility to safest level of function  Description: INTERVENTIONS:  - Assess patient's ability to carry out ADLs; assess patient's baseline for ADL function and identify physical deficits which impact ability to perform ADLs (bathing, care of mouth/teeth, toileting, grooming, dressing, etc.)  - Assess/evaluate cause of self-care deficits   - Assess range of motion  - Assess patient's mobility  - Assess patient's need for assistive devices and provide as  appropriate  - Encourage maximum independence but intervene and supervise when necessary  - Involve family in performance of ADLs  - Assess for home care needs following discharge   - Consider OT consult to assist with ADL evaluation and planning for discharge  - Provide patient education as appropriate  Outcome: Progressing     Problem: COPING  Goal: Pt/Family able to verbalize concerns and demonstrate effective coping strategies  Description: INTERVENTIONS:  - Assist patient/family to identify coping skills, available support systems and cultural and spiritual values  - Provide emotional support, including active listening and acknowledgement of concerns of patient and caregivers  - Reduce environmental stimuli, as able  - Provide patient education  - Assess for spiritual pain/suffering and initiate spiritual care, including notification of Pastoral Care or natalia based community as needed  - Assess effectiveness of coping strategies  Outcome: Progressing  Goal: Will report anxiety at manageable levels  Description: INTERVENTIONS:  - Administer medication as ordered  - Teach and encourage coping skills  - Provide emotional support  - Assess patient/family for anxiety and ability to cope  Outcome: Progressing     Problem: Potential for Falls  Goal: Patient will remain free of falls  Description: INTERVENTIONS:  - Educate patient/family on patient safety including physical limitations  - Instruct patient to call for assistance with activity   - Consult OT/PT to assist with strengthening/mobility   - Keep Call bell within reach  - Keep bed low and locked with side rails adjusted as appropriate  - Keep care items and personal belongings within reach  - Initiate and maintain comfort rounds  - Make Fall Risk Sign visible to staff  - Offer Toileting every 2 Hours, in advance of need  - Initiate/Maintain bed alarm  - Obtain necessary fall risk management equipment: bed alarm  - Apply yellow socks and bracelet for high fall  risk patients  - Consider moving patient to room near nurses station  Outcome: Progressing

## 2024-01-14 NOTE — PROGRESS NOTES
Progress Note - Infectious Disease   Carla Hunt 58 y.o. female MRN: [id number]  Unit/Bed#: ICU 13 Encounter: [id number]      Impression/Plan:  SIRS.  If patient does indeed have ongoing seizure, SIRS may be all secondary to it. Consideration for CNS infection, as in below.  Consideration for COVID as bacterial pneumonia contributing to this.  Patient is overall clinically proved and she remains systemically nontoxic.  Admission blood cultures have no growth thus far.  Antibiotic and antiviral plan as seen above.    Workup for seizure in progress.  Monitor temperature/WBC.  Follow-up on admission blood cultures.     Encephalitis versus encephalopathy.  Head CT and MRI did not show acute CVA or any mass lesion.  There is no seizure on EEG monitoring now but it is unclear whether patient may have a seizure during recent hospitalization at [facility] prior to transfer.  There was no clinical evidence of bacterial meningitis but HSV encephalitis is possible.  Also, consider encephalopathy/encephalitis from COVID.  Consider postictal state.  Patient's mental status is improved.  She is status post lumbar puncture.  Unfortunately, due to her agitation, only a small amount of CSF was obtained.  CSF protein was only 1. Even with patient having received 2 days of antibiotic, especially given absence of signs and symptoms of bacterial meningitis, doubt that patient has bacterial meningitis with absence of CSF pleocytosis.  Will discontinue antibiotics to avoid potential antibiotic toxicities.  However, HSV encephalitis is still possibility. M/E PCR panel negative. Given ongoing concern about the possibility of HSV encephalitis, we will continue IV acyclovir for now.  No further need for antibiotic for this indication.    Continue IV acyclovir for now.  Follow-up on CSF culture.  Monitor mental status.  Await repeat lumbar puncture for HSV PCR.     Severe COVID, with acute hypoxic respiratory failure.  Patient is  currently on remdesivir and dexamethasone and did receive 1 dose of Tocilizumab.  CXR with opacities.  With elevated procalcitonin, there is concern of possible concurrent bacterial pneumonia. Patient did receive initial rounds of COVID vaccination but not the new COVID vaccination available this past fall.  CRP and procalcitonin level are both decreasing  Continue remdesivir and dexamethasone.  Plan extended course of remdesivir for 10 days in the face of substantial immunosuppression  Continue ceftriaxone through tomorrow to complete 7 days of treatment  Monitor respiratory symptoms and O2 saturation.  Recheck CRP and procalcitonin level to look for evidence of progressive inflammatory changes or response to antibiotic therapy     Acute hypoxic respiratory failure, likely multifactorial, with COVID contributing.  Respiratory status stable.  O2 support is stable, with patient remaining on high flow O2 support.  O2 support per critical service.     CKD.  Creatinine baseline.  Antibiotic and antiviral dosages adjusted accordingly.  Recheck BMP to make sure no dose adjustments needed with any of the treatments     Seizure disorder, status post temporal lobe resection in 2007.  There is no active seizure on EEG monitoring at present but it is unclear whether patient may have had seizure earlier.  Neurology follow-up.     B-cell lymphoma, on chemotherapy.  Patient is leukopenic but not neutropenic.  Monitor WBC/ANC.    Discussed with the critical care service the plan to continue the acyclovir, ceftriaxone, and remdesivir for now.  Also discussed with them the recommendation to proceed with repeat lumbar puncture.  They agree with the plan for the antibiotics to continue and will discuss further with the critical care team about the lumbar puncture.    Antibiotics:  Acyclovir 5  Ceftriaxone 6  Remdesivir 6    Subjective:  Patient has no fever, chills, sweats; no nausea, vomiting, diarrhea; no cough, shortness of breath;  no pain. No new symptoms.  Remains on high flow oxygen.  Remains confused.    Objective:  Vitals:  Temp:  [98.5 °F (36.9 °C)-99.3 °F (37.4 °C)] 99 °F (37.2 °C)  HR:  [] 72  Resp:  [18-26] 20  BP: ()/(52-78) 119/71  SpO2:  [87 %-100 %] 95 %  Temp (24hrs), Av °F (37.2 °C), Min:98.5 °F (36.9 °C), Max:99.3 °F (37.4 °C)  Current: Temperature: 99 °F (37.2 °C)    Physical Exam:   General Appearance:  Alert, interactive, nontoxic, no acute distress.  Remains confused   Throat: Oropharynx moist without lesions.  On high flow oxygen   Lungs:   Clear to auscultation bilaterally; no wheezes, rhonchi or rales; respirations unlabored   Heart:  RRR; no murmur, rub or gallop   Abdomen:   Soft, non-tender, non-distended, positive bowel sounds.     Extremities: No clubbing, cyanosis or edema   Skin: No new rashes or lesions. No draining wounds noted.       Labs, Imaging, & Other studies:   All pertinent labs and imaging studies were personally reviewed  Results from last 7 days   Lab Units 24  0521 24  0506 24  0558   WBC Thousand/uL 9.04 7.92 3.48*   HEMOGLOBIN g/dL 10.8* 11.6 11.4*   PLATELETS Thousands/uL 271 302 293     Results from last 7 days   Lab Units 24  0506 24  0558 24  0454 01/10/24  1030 01/10/24  0441 24  1926   SODIUM mmol/L 148* 150* 152*   < > 148* 146   POTASSIUM mmol/L 3.0* 3.3* 3.9   < > 4.0 3.6   CHLORIDE mmol/L 114* 118* 120*   < > 121* 120*   CO2 mmol/L 23 21 20*   < > 18* 18*   BUN mg/dL 18 24 26*   < > 21 20   CREATININE mg/dL 0.96 1.12 1.23   < > 1.20 1.31*   EGFR ml/min/1.73sq m 65 54 48   < > 50 45   CALCIUM mg/dL 7.5* 7.8* 9.0   < > 8.8 8.8   AST U/L  --   --  97*  --  125* 135*   ALT U/L  --   --  77*  --  87* 89*   ALK PHOS U/L  --   --  92  --  97 99    < > = values in this interval not displayed.     Results from last 7 days   Lab Units 24  0408 01/10/24  2115 01/09/24  2039 01/09/24  2035 01/07/24  2256 24    BLOOD CULTURE   --   --  No Growth at 72 hrs. No Growth at 72 hrs.  --  No Growth After 5 Days. No Growth After 5 Days.   GRAM STAIN RESULT  No No Polys or Bacteria seen  --   --   --   --   --   --    URINE CULTURE   --   --   --   --  60,000-69,000 cfu/ml Lactobacillus species*  --   --    MRSA CULTURE ONLY   --   --  No Methicillin Resistant Staphlyococcus aureus (MRSA) isolated  --   --   --   --    LEGIONELLA URINARY ANTIGEN   --  Negative  --   --   --   --   --      Results from last 7 days   Lab Units 01/14/24  0521 01/11/24  0611 01/10/24  0441 01/09/24  1926 01/09/24  0335   PROCALCITONIN ng/ml 0.32* 0.37* 0.60* 0.66* 0.63*     Results from last 7 days   Lab Units 01/11/24  0454 01/10/24  0441 01/09/24  1926   CRP mg/L 76.6* 110.4* 88.6*         Results from last 7 days   Lab Units 01/09/24 1926   D-DIMER QUANTITATIVE ug/ml FEU 1.98*     Chest x-ray.  Stable bilateral consolidation.    Images personally reviewed by me in PACS

## 2024-01-15 ENCOUNTER — APPOINTMENT (INPATIENT)
Dept: RADIOLOGY | Facility: HOSPITAL | Age: 59
DRG: 003 | End: 2024-01-15
Payer: COMMERCIAL

## 2024-01-15 LAB
ANION GAP SERPL CALCULATED.3IONS-SCNC: 9 MMOL/L
ANISOCYTOSIS BLD QL SMEAR: PRESENT
APPEARANCE CSF: NORMAL
BACTERIA BLD CULT: NORMAL
BACTERIA BLD CULT: NORMAL
BASOPHILS # BLD MANUAL: 0 THOUSAND/UL (ref 0–0.1)
BASOPHILS NFR MAR MANUAL: 0 % (ref 0–1)
BUN SERPL-MCNC: 15 MG/DL (ref 5–25)
C GATTII+NEOFOR DNA CSF QL NAA+NON-PROBE: NOT DETECTED
CALCIUM SERPL-MCNC: 8 MG/DL (ref 8.4–10.2)
CHLORIDE SERPL-SCNC: 116 MMOL/L (ref 96–108)
CMV DNA CSF QL NAA+NON-PROBE: NOT DETECTED
CO2 SERPL-SCNC: 21 MMOL/L (ref 21–32)
CREAT SERPL-MCNC: 0.9 MG/DL (ref 0.6–1.3)
E COLI K1 DNA CSF QL NAA+NON-PROBE: NOT DETECTED
EOSINOPHIL # BLD MANUAL: 0.1 THOUSAND/UL (ref 0–0.4)
EOSINOPHIL NFR BLD MANUAL: 1 % (ref 0–6)
EOSINOPHIL NFR CSF MANUAL: 13 %
ERYTHROCYTE [DISTWIDTH] IN BLOOD BY AUTOMATED COUNT: 13.7 % (ref 11.6–15.1)
EV RNA CSF QL NAA+NON-PROBE: NOT DETECTED
GFR SERPL CREATININE-BSD FRML MDRD: 71 ML/MIN/1.73SQ M
GLUCOSE CSF-MCNC: 113 MG/DL (ref 40–70)
GLUCOSE SERPL-MCNC: 136 MG/DL (ref 65–140)
GLUCOSE SERPL-MCNC: 176 MG/DL (ref 65–140)
GLUCOSE SERPL-MCNC: 180 MG/DL (ref 65–140)
GLUCOSE SERPL-MCNC: 238 MG/DL (ref 65–140)
GLUCOSE SERPL-MCNC: 269 MG/DL (ref 65–140)
GP B STREP DNA CSF QL NAA+NON-PROBE: NOT DETECTED
GRAM STN SPEC: NORMAL
GRAM STN SPEC: NORMAL
HAEM INFLU DNA CSF QL NAA+NON-PROBE: NOT DETECTED
HCT VFR BLD AUTO: 33.2 % (ref 34.8–46.1)
HGB BLD-MCNC: 11.1 G/DL (ref 11.5–15.4)
HHV6 DNA CSF QL NAA+NON-PROBE: NOT DETECTED
HSV1 DNA CSF QL NAA+NON-PROBE: NOT DETECTED
HSV1 DNA CSF QL NAA+PROBE: NOT DETECTED
HSV1 DNA CSF QL NAA+PROBE: NOT DETECTED
HSV2 DNA CSF QL NAA+NON-PROBE: NOT DETECTED
L MONOCYTOG DNA CSF QL NAA+NON-PROBE: NOT DETECTED
LAMOTRIGINE SERPL-MCNC: 13.3 UG/ML (ref 2–20)
LYMPHOCYTES # BLD AUTO: 0 % (ref 14–44)
LYMPHOCYTES # BLD AUTO: 0 THOUSAND/UL (ref 0.6–4.47)
MAGNESIUM SERPL-MCNC: 1.9 MG/DL (ref 1.9–2.7)
MCH RBC QN AUTO: 28.8 PG (ref 26.8–34.3)
MCHC RBC AUTO-ENTMCNC: 33.4 G/DL (ref 31.4–37.4)
MCV RBC AUTO: 86 FL (ref 82–98)
METAMYELOCYTES NFR BLD MANUAL: 1 % (ref 0–1)
MICROCYTES BLD QL AUTO: PRESENT
MONOCYTES # BLD AUTO: 0.19 THOUSAND/UL (ref 0–1.22)
MONOCYTES NFR BLD: 2 % (ref 4–12)
MONOS+MACROS CSF MANUAL: 88 %
MYELOCYTES NFR BLD MANUAL: 2 % (ref 0–1)
N MEN DNA CSF QL NAA+NON-PROBE: NOT DETECTED
NEUTROPHILS # BLD MANUAL: 9 THOUSAND/UL (ref 1.85–7.62)
NEUTS BAND NFR BLD MANUAL: 6 % (ref 0–8)
NEUTS SEG NFR BLD AUTO: 88 % (ref 43–75)
OVALOCYTES BLD QL SMEAR: PRESENT
PARECHOVIRUS A RNA CSF QL NAA+NON-PROBE: NOT DETECTED
PHOSPHATE SERPL-MCNC: 2 MG/DL (ref 2.7–4.5)
PLATELET # BLD AUTO: 286 THOUSANDS/UL (ref 149–390)
PLATELET BLD QL SMEAR: ADEQUATE
PMV BLD AUTO: 10.1 FL (ref 8.9–12.7)
POIKILOCYTOSIS BLD QL SMEAR: PRESENT
POLYCHROMASIA BLD QL SMEAR: PRESENT
POTASSIUM SERPL-SCNC: 3.4 MMOL/L (ref 3.5–5.3)
PROT CSF-MCNC: 50 MG/DL (ref 15–45)
RBC # BLD AUTO: 3.85 MILLION/UL (ref 3.81–5.12)
RBC # CSF MANUAL: 25 UL (ref 0–10)
RBC MORPH BLD: PRESENT
S PNEUM DNA CSF QL NAA+NON-PROBE: NOT DETECTED
SODIUM SERPL-SCNC: 146 MMOL/L (ref 135–147)
TOTAL CELLS COUNTED BLD: NO
TOTAL CELLS COUNTED SPEC: 8
TUBE # CSF: 4
VZV DNA CSF QL NAA+NON-PROBE: NOT DETECTED
WBC # BLD AUTO: 9.57 THOUSAND/UL (ref 4.31–10.16)
WBC # CSF AUTO: 1 /UL (ref 0–5)

## 2024-01-15 PROCEDURE — 86051 AQUAPORIN-4 ANTB ELISA: CPT

## 2024-01-15 PROCEDURE — 87529 HSV DNA AMP PROBE: CPT

## 2024-01-15 PROCEDURE — 92526 ORAL FUNCTION THERAPY: CPT

## 2024-01-15 PROCEDURE — 80048 BASIC METABOLIC PNL TOTAL CA: CPT

## 2024-01-15 PROCEDURE — 86341 ISLET CELL ANTIBODY: CPT

## 2024-01-15 PROCEDURE — 84157 ASSAY OF PROTEIN OTHER: CPT

## 2024-01-15 PROCEDURE — 94762 N-INVAS EAR/PLS OXIMTRY CONT: CPT

## 2024-01-15 PROCEDURE — 86255 FLUORESCENT ANTIBODY SCREEN: CPT

## 2024-01-15 PROCEDURE — 85027 COMPLETE CBC AUTOMATED: CPT

## 2024-01-15 PROCEDURE — 88184 FLOWCYTOMETRY/ TC 1 MARKER: CPT

## 2024-01-15 PROCEDURE — 89050 BODY FLUID CELL COUNT: CPT

## 2024-01-15 PROCEDURE — NC001 PR NO CHARGE: Performed by: PSYCHIATRY & NEUROLOGY

## 2024-01-15 PROCEDURE — 71045 X-RAY EXAM CHEST 1 VIEW: CPT

## 2024-01-15 PROCEDURE — 94640 AIRWAY INHALATION TREATMENT: CPT

## 2024-01-15 PROCEDURE — 82945 GLUCOSE OTHER FLUID: CPT

## 2024-01-15 PROCEDURE — 83735 ASSAY OF MAGNESIUM: CPT

## 2024-01-15 PROCEDURE — 94760 N-INVAS EAR/PLS OXIMETRY 1: CPT

## 2024-01-15 PROCEDURE — 87070 CULTURE OTHR SPECIMN AEROBIC: CPT

## 2024-01-15 PROCEDURE — 87483 CNS DNA AMP PROBE TYPE 12-25: CPT | Performed by: PSYCHIATRY & NEUROLOGY

## 2024-01-15 PROCEDURE — 84100 ASSAY OF PHOSPHORUS: CPT

## 2024-01-15 PROCEDURE — 009U3ZX DRAINAGE OF SPINAL CANAL, PERCUTANEOUS APPROACH, DIAGNOSTIC: ICD-10-PCS | Performed by: PSYCHIATRY & NEUROLOGY

## 2024-01-15 PROCEDURE — 88108 CYTOPATH CONCENTRATE TECH: CPT | Performed by: PATHOLOGY

## 2024-01-15 PROCEDURE — 97167 OT EVAL HIGH COMPLEX 60 MIN: CPT

## 2024-01-15 PROCEDURE — 94664 DEMO&/EVAL PT USE INHALER: CPT

## 2024-01-15 PROCEDURE — 82948 REAGENT STRIP/BLOOD GLUCOSE: CPT

## 2024-01-15 PROCEDURE — 99291 CRITICAL CARE FIRST HOUR: CPT | Performed by: PSYCHIATRY & NEUROLOGY

## 2024-01-15 PROCEDURE — 97163 PT EVAL HIGH COMPLEX 45 MIN: CPT

## 2024-01-15 PROCEDURE — 99232 SBSQ HOSP IP/OBS MODERATE 35: CPT | Performed by: INTERNAL MEDICINE

## 2024-01-15 PROCEDURE — 85007 BL SMEAR W/DIFF WBC COUNT: CPT

## 2024-01-15 PROCEDURE — 62270 DX LMBR SPI PNXR: CPT | Performed by: PSYCHIATRY & NEUROLOGY

## 2024-01-15 PROCEDURE — 88185 FLOWCYTOMETRY/TC ADD-ON: CPT

## 2024-01-15 PROCEDURE — 89051 BODY FLUID CELL COUNT: CPT

## 2024-01-15 RX ORDER — POTASSIUM CHLORIDE 14.9 MG/ML
20 INJECTION INTRAVENOUS ONCE
Status: COMPLETED | OUTPATIENT
Start: 2024-01-15 | End: 2024-01-15

## 2024-01-15 RX ORDER — BUSPIRONE HYDROCHLORIDE 5 MG/1
5 TABLET ORAL 2 TIMES DAILY
Status: DISCONTINUED | OUTPATIENT
Start: 2024-01-15 | End: 2024-01-16

## 2024-01-15 RX ORDER — INSULIN LISPRO 100 [IU]/ML
1-6 INJECTION, SOLUTION INTRAVENOUS; SUBCUTANEOUS
Status: DISCONTINUED | OUTPATIENT
Start: 2024-01-15 | End: 2024-01-16

## 2024-01-15 RX ORDER — SODIUM CHLORIDE, SODIUM GLUCONATE, SODIUM ACETATE, POTASSIUM CHLORIDE, MAGNESIUM CHLORIDE, SODIUM PHOSPHATE, DIBASIC, AND POTASSIUM PHOSPHATE .53; .5; .37; .037; .03; .012; .00082 G/100ML; G/100ML; G/100ML; G/100ML; G/100ML; G/100ML; G/100ML
1000 INJECTION, SOLUTION INTRAVENOUS ONCE
Status: COMPLETED | OUTPATIENT
Start: 2024-01-15 | End: 2024-01-15

## 2024-01-15 RX ORDER — INSULIN LISPRO 100 [IU]/ML
2-12 INJECTION, SOLUTION INTRAVENOUS; SUBCUTANEOUS EVERY 6 HOURS SCHEDULED
Status: DISCONTINUED | OUTPATIENT
Start: 2024-01-16 | End: 2024-01-16

## 2024-01-15 RX ORDER — ALBUMIN, HUMAN INJ 5% 5 %
25 SOLUTION INTRAVENOUS ONCE
Status: COMPLETED | OUTPATIENT
Start: 2024-01-15 | End: 2024-01-15

## 2024-01-15 RX ORDER — MAGNESIUM SULFATE HEPTAHYDRATE 40 MG/ML
2 INJECTION, SOLUTION INTRAVENOUS ONCE
Status: COMPLETED | OUTPATIENT
Start: 2024-01-15 | End: 2024-01-15

## 2024-01-15 RX ORDER — ENOXAPARIN SODIUM 100 MG/ML
40 INJECTION SUBCUTANEOUS DAILY
Status: DISCONTINUED | OUTPATIENT
Start: 2024-01-16 | End: 2024-02-20

## 2024-01-15 RX ORDER — ESCITALOPRAM OXALATE 10 MG/1
10 TABLET ORAL DAILY
Status: DISCONTINUED | OUTPATIENT
Start: 2024-01-15 | End: 2024-01-15 | Stop reason: SDUPTHER

## 2024-01-15 RX ORDER — QUETIAPINE FUMARATE 25 MG/1
25 TABLET, FILM COATED ORAL
Status: DISCONTINUED | OUTPATIENT
Start: 2024-01-15 | End: 2024-02-05

## 2024-01-15 RX ORDER — LANOLIN ALCOHOL/MO/W.PET/CERES
6 CREAM (GRAM) TOPICAL
Status: DISCONTINUED | OUTPATIENT
Start: 2024-01-15 | End: 2024-01-16

## 2024-01-15 RX ADMIN — MAGNESIUM SULFATE HEPTAHYDRATE 2 G: 40 INJECTION, SOLUTION INTRAVENOUS at 13:05

## 2024-01-15 RX ADMIN — ENOXAPARIN SODIUM 30 MG: 30 INJECTION SUBCUTANEOUS at 08:58

## 2024-01-15 RX ADMIN — BUSPIRONE HYDROCHLORIDE 5 MG: 5 TABLET ORAL at 17:06

## 2024-01-15 RX ADMIN — ACYCLOVIR SODIUM 650 MG: 50 INJECTION, SOLUTION INTRAVENOUS at 12:27

## 2024-01-15 RX ADMIN — POLYETHYLENE GLYCOL 3350 17 G: 17 POWDER, FOR SOLUTION ORAL at 08:59

## 2024-01-15 RX ADMIN — VENLAFAXINE 25 MG: 25 TABLET ORAL at 12:27

## 2024-01-15 RX ADMIN — LAMOTRIGINE 150 MG: 100 TABLET ORAL at 08:59

## 2024-01-15 RX ADMIN — REMDESIVIR 100 MG: 100 INJECTION, POWDER, LYOPHILIZED, FOR SOLUTION INTRAVENOUS at 16:45

## 2024-01-15 RX ADMIN — POTASSIUM CHLORIDE 20 MEQ: 14.9 INJECTION, SOLUTION INTRAVENOUS at 10:00

## 2024-01-15 RX ADMIN — INSULIN LISPRO 3 UNITS: 100 INJECTION, SOLUTION INTRAVENOUS; SUBCUTANEOUS at 12:27

## 2024-01-15 RX ADMIN — DORNASE ALFA 2.5 MG: 1 SOLUTION RESPIRATORY (INHALATION) at 08:08

## 2024-01-15 RX ADMIN — CEFTRIAXONE SODIUM 2000 MG: 10 INJECTION, POWDER, FOR SOLUTION INTRAVENOUS at 05:36

## 2024-01-15 RX ADMIN — INSULIN LISPRO 3 UNITS: 100 INJECTION, SOLUTION INTRAVENOUS; SUBCUTANEOUS at 16:48

## 2024-01-15 RX ADMIN — DEXAMETHASONE SODIUM PHOSPHATE 7.7 MG: 10 INJECTION, SOLUTION INTRAMUSCULAR; INTRAVENOUS at 14:45

## 2024-01-15 RX ADMIN — TOPIRAMATE 100 MG: 100 TABLET, FILM COATED ORAL at 08:58

## 2024-01-15 RX ADMIN — ALBUMIN (HUMAN) 25 G: 12.5 INJECTION, SOLUTION INTRAVENOUS at 14:45

## 2024-01-15 RX ADMIN — ACYCLOVIR SODIUM 650 MG: 50 INJECTION, SOLUTION INTRAVENOUS at 20:03

## 2024-01-15 RX ADMIN — ACYCLOVIR SODIUM 650 MG: 1000 INJECTION, SOLUTION INTRAVENOUS at 02:00

## 2024-01-15 RX ADMIN — OLANZAPINE 10 MG: 10 TABLET, ORALLY DISINTEGRATING ORAL at 08:58

## 2024-01-15 RX ADMIN — BUSPIRONE HYDROCHLORIDE 5 MG: 5 TABLET ORAL at 12:27

## 2024-01-15 RX ADMIN — DEXAMETHASONE SODIUM PHOSPHATE 7.7 MG: 10 INJECTION, SOLUTION INTRAMUSCULAR; INTRAVENOUS at 04:00

## 2024-01-15 RX ADMIN — LAMOTRIGINE 150 MG: 100 TABLET ORAL at 22:35

## 2024-01-15 RX ADMIN — VENLAFAXINE 25 MG: 25 TABLET ORAL at 16:45

## 2024-01-15 RX ADMIN — CHLORHEXIDINE GLUCONATE 15 ML: 1.2 SOLUTION ORAL at 08:59

## 2024-01-15 RX ADMIN — SODIUM CHLORIDE, SODIUM GLUCONATE, SODIUM ACETATE, POTASSIUM CHLORIDE, MAGNESIUM CHLORIDE, SODIUM PHOSPHATE, DIBASIC, AND POTASSIUM PHOSPHATE 1000 ML: .53; .5; .37; .037; .03; .012; .00082 INJECTION, SOLUTION INTRAVENOUS at 13:23

## 2024-01-15 RX ADMIN — TOPIRAMATE 100 MG: 100 TABLET, FILM COATED ORAL at 17:00

## 2024-01-15 RX ADMIN — INSULIN LISPRO 1 UNITS: 100 INJECTION, SOLUTION INTRAVENOUS; SUBCUTANEOUS at 08:58

## 2024-01-15 RX ADMIN — SENNOSIDES, DOCUSATE SODIUM 2 TABLET: 8.6; 5 TABLET ORAL at 08:58

## 2024-01-15 RX ADMIN — DEXMEDETOMIDINE HYDROCHLORIDE 1.2 MCG/KG/HR: 4 INJECTION, SOLUTION INTRAVENOUS at 05:26

## 2024-01-15 RX ADMIN — VENLAFAXINE 25 MG: 25 TABLET ORAL at 08:58

## 2024-01-15 RX ADMIN — POTASSIUM PHOSPHATE, MONOBASIC POTASSIUM PHOSPHATE, DIBASIC 15 MMOL: 224; 236 INJECTION, SOLUTION, CONCENTRATE INTRAVENOUS at 09:14

## 2024-01-15 RX ADMIN — CHLORHEXIDINE GLUCONATE 15 ML: 1.2 SOLUTION ORAL at 22:51

## 2024-01-15 NOTE — PLAN OF CARE
Problem: OCCUPATIONAL THERAPY ADULT  Goal: Performs self-care activities at highest level of function for planned discharge setting.  See evaluation for individualized goals.  Description: Treatment Interventions: ADL retraining, Functional transfer training, UE strengthening/ROM, Endurance training, Patient/family training, Equipment evaluation/education, Compensatory technique education, Continued evaluation, Energy conservation, Activityengagement          See flowsheet documentation for full assessment, interventions and recommendations.   Note: Limitation: Decreased ADL status, Decreased UE strength, Decreased Safe judgement during ADL, Decreased endurance, Decreased self-care trans, Decreased high-level ADLs  Prognosis: Good  Assessment: Pt is a 58 y.o. female seen for OT evaluation s/p admit to [facility] on 1/10/2024 w/ Encephalopathy acute.  Comorbidities affecting pt's functional performance at time of assessment include:  has a past medical history of hypercalcemia, Lymphoma (HCC), Parathyroid disease (HCC), Seizures (HCC), and Situational depression.. Personal factors affecting pt at time of IE include:steps to enter environment, difficulty performing ADLS, difficulty performing IADLS , limited insight into deficits, flat affect, decreased initiation and engagement , and health management . Prior to admission, pt was  I with ADLS and IADLS. Upon evaluation: Pt presents supine and is agreeable to OTIE, all vitals WNLS.  Pt requires overall mod a x 2 2* the following deficits impacting occupational performance: weakness, decreased strength, decreased balance, decreased tolerance, impaired attention, impaired sequencing, impaired problem solving, impulsivity, and decreased coping skills. Pt resting in chair at end of session with all needs in reach, alarm on, all lines in place and SCD's on. Pt to benefit from continued skilled OT tx while in the hospital to address deficits as defined above and maximize level  of functional independence w ADL's and functional mobility. Occupational Performance areas to address include: eating, grooming, bathing/shower, toilet hygiene, dressing, health maintenance, functional mobility, community mobility, and clothing management. The patient's raw score on the AM-PAC Daily Activity inpatient short form is  , standardized score is  , less than 39.4. Patients at this level are likely to benefit from discharge to post-acute rehabilitation services. Please refer to the recommendation of the Occupational Therapist for safe discharge planning.     Rehab Resource Intensity Level, OT: I (Maximum Resource Intensity)

## 2024-01-15 NOTE — UTILIZATION REVIEW
Continued Stay Review    Date: 1/13, 1/14, 1/15                           Current Patient Class: IP  Current Level of Care: Critical Care    HPI:58 y.o. female initially admitted on 1/10.     Assessment/Plan:   1/13: Pt desated overnight to low 80s, with no acute distress; HFNC increased twice w/ improved sats. Also noted to be hypotensive w/ SBP in 80s, precedex gtt held w/ increased in SBP to 90. CXR showed mild improvement. Exam: coarse lunch sounds, word salad. Plan: Steroids increased to 0.1mg/kg. Continue acyclovir, continue home lamictal/topamx, Effexor, hold buspar and lexapro. Wean HFNC for sats > 88%. Continue remdesivir, dornase, enoxaparin BID, Trend labs, replete electrolytes as needed.supportive care. 0.45% saline gtt. Neuro checks.    1/14: HFNC weaned to 80/40. Exam, ill-appearing, disoriented to person and place, strength intact, word salad. Plan: continue current meds, zyprexa qHS, neuro checks, IV remdesivir, IV steroids, IV acyclovir. Wean HFNC as able, Trend labs, replete electrolytes as needed.    1/15: Pt disoriented to time & situation. Plan: precedex gtt weaned as able, resume home buspar and lexapro, continue other current meds. Wean HFNC as able; IV ABX, IV steroids, IV remdesivir, d/c IVF, Trend labs, replete electrolytes as needed.    Vital Signs:   Date/Time Temp Pulse Resp BP MAP (mmHg) SpO2 FiO2 (%) Calculated FIO2 (%) - Nasal Cannula O2 Flow Rate (L/min) Nasal Cannula O2 Flow Rate (L/min) O2 Device   01/15/24 1432 -- -- -- -- -- 92 % 50 220 50 L/min 50 L/min High flow nasal cannula   01/15/24 1400 -- 90 20 84/58 Abnormal  66 93 % -- -- -- -- --   01/15/24 1300 -- 86 21 68/49 Abnormal  56 Abnormal  80 % Abnormal  -- -- -- -- --   01/15/24 1200 98.5 °F (36.9 °C) 58 20 99/61 73 92 % -- 220 50 L/min 50 L/min High flow nasal cannula   01/15/24 1100 -- 88 17 98/58 80 92 % -- -- -- -- --   01/15/24 1015 -- 76 23 Abnormal  90/59 68 96 % -- -- -- -- --   01/15/24 0930 -- 68 19 98/73 87 95 %  -- -- -- -- --   01/15/24 0809 -- 51 Abnormal  20 -- -- 93 % 100 220 50 L/min 50 L/min High flow nasal cannula   01/15/24 0800 98.6 °F (37 °C) 76 22 147/79 106 92 % -- -- -- -- High flow nasal cannula   01/15/24 0700 -- 50 Abnormal  17 170/90 138 97 % -- -- -- -- --   01/15/24 0600 -- 52 Abnormal  17 151/80 115 94 % -- -- -- -- --   01/15/24 0500 -- 64 22 125/78 100 90 % -- -- -- -- --   01/15/24 0400 98.3 °F (36.8 °C) 48 Abnormal  19 155/75 112 91 % 70 220 -- 50 L/min High flow nasal cannula   01/15/24 0311 -- -- -- -- -- 92 % -- -- -- -- --   01/15/24 0300 -- 50 Abnormal  20 148/78 151 94 % -- -- -- -- --   01/15/24 0200 -- 52 Abnormal  18 140/76 101 95 % -- -- -- -- --   01/15/24 0100 -- 88 18 147/98 112 93 % -- -- -- -- --   01/15/24 0000 98.6 °F (37 °C) 54 Abnormal  21 142/78 108 95 % -- 300 50 L/min 70 L/min High flow nasal cannula   01/14/24 2300 -- 52 Abnormal  23 Abnormal  152/83 114 91 % -- -- -- -- --   01/14/24 2200 -- 66 23 Abnormal  155/88 114 97 % -- -- -- -- --   01/14/24 2100 -- 54 Abnormal  27 Abnormal  143/77 102 93 % -- -- -- -- --   01/14/24 2000 98.3 °F (36.8 °C) 58 22 144/79 109 94 % 70 220 -- 50 L/min High flow nasal cannula   01/14/24 1924 -- -- -- -- -- 94 % -- -- -- -- --   01/14/24 1919 -- -- -- -- -- 95 % -- -- -- -- --   01/14/24 1900 -- 70 25 Abnormal  133/68 92 98 % -- -- -- -- --   01/14/24 1800 -- 84 20 135/78 108 90 % -- -- -- -- --   01/14/24 1700 99 °F (37.2 °C) 72 21 133/73 92 95 % -- -- -- -- --   01/14/24 1600 -- 58 20 108/64 81 96 % -- -- -- -- --   01/14/24 1500 -- 66 22 98/54 67 94 % -- -- -- -- --   01/14/24 1436 -- -- -- -- -- -- -- -- -- -- --   01/14/24 1400 -- 60 21 116/68 82 97 % -- -- -- -- --   01/14/24 1300 -- 68 20 110/64 78 96 % -- -- -- -- --   01/14/24 1200 -- 62 21 117/66 80 96 % -- -- -- -- --   01/14/24 1130 -- 80 17 105/63 79 93 % -- -- -- -- --   01/14/24 1100 -- 82 18 98/58 65 91 % -- -- -- -- --   01/14/24 1000 -- 66 21 107/54 74 90 % -- -- -- -- --    01/14/24 0900 -- 58 23 Abnormal  125/69 88 95 % -- -- -- -- --   01/14/24 0840 -- -- -- -- -- -- -- -- -- -- --   01/14/24 0820 -- 62 24 Abnormal  138/73 97 96 % -- -- -- -- --   01/14/24 0810 -- 72 24 Abnormal  132/70 97 92 % -- -- -- -- --   01/14/24 0800 -- 82 28 Abnormal  -- -- 91 % -- -- -- -- --   01/14/24 0730 99.3 °F (37.4 °C) 62 24 Abnormal  135/76 94 86 % Abnormal  -- -- -- -- --   01/14/24 0700 -- 72 20 119/71 81 95 % -- -- -- -- --   01/14/24 0600 -- 54 Abnormal  22 114/63 80 95 % -- -- -- -- --   01/14/24 0500 -- 64 24 Abnormal  111/58 72 92 % -- -- -- -- --   01/14/24 0400 99 °F (37.2 °C) 68 26 Abnormal  101/52 65 87 % Abnormal  80 -- 50 L/min -- High flow nasal cannula   01/14/24 0309 -- -- -- -- -- 92 % -- -- -- -- --   01/14/24 0300 -- 62 23 Abnormal  99/54 65 91 % -- -- -- -- --   01/14/24 0200 -- 58 22 104/58 74 92 % -- -- -- -- --   01/14/24 0100 -- 80 21 122/72 89 89 % Abnormal  -- -- -- -- --   01/14/24 0000 -- 62 20 112/60 80 91 % 80 -- 50 L/min -- High flow nasal cannula   01/13/24 2330 -- 62 21 113/63 79 91 % -- -- -- -- --   01/13/24 2300 -- 100 20 130/72 94 92 % -- -- -- -- --   01/13/24 2200 -- 64 22 119/66 81 94 % -- -- -- -- --   01/13/24 2100 -- 66 25 Abnormal  126/78 106 94 % -- -- -- -- --   01/13/24 2000 98.9 °F (37.2 °C) 82 21 137/76 95 96 % 100 -- 50 L/min -- High flow nasal cannula   01/13/24 1932 -- -- -- -- -- 91 % -- -- -- -- --   01/13/24 1926 -- -- -- -- -- 91 % -- -- -- -- --   01/13/24 1800 -- 88 22 131/77 87 97 % -- -- -- -- --   01/13/24 1700 -- 70 24 Abnormal  106/61 76 95 % -- -- -- -- --   01/13/24 1600 99.2 °F (37.3 °C) 100 18 116/71 77 94 % -- -- -- -- --   01/13/24 1500 -- 72 22 108/60 75 95 % -- -- -- -- --   01/13/24 1437 -- -- -- -- -- -- -- -- -- -- --   01/13/24 1430 -- 94 18 113/61 74 94 % -- -- -- -- --   01/13/24 1405 -- 94 19 123/63 82 90 % -- -- -- -- --   01/13/24 1318 99.3 °F (37.4 °C) 84 19 121/63 79 95 % -- -- -- -- --   01/13/24 1305 -- 90 21  111/61 86 95 % -- -- -- -- --   01/13/24 1300 -- 94 23 Abnormal  111/61 86 93 % -- -- -- -- --   01/13/24 1200 -- 92 21 119/77 88 96 % -- -- -- -- --   01/13/24 1100 -- 106 Abnormal  19 114/64 80 98 % -- -- -- -- --   01/13/24 1000 -- 102 18 124/72 91 100 % -- -- -- -- --   01/13/24 0905 -- 106 Abnormal  19 120/68 80 95 % -- -- -- -- --   01/13/24 0900 -- 112 Abnormal  20 120/68 80 93 % -- -- -- -- --   01/13/24 0800 98.5 °F (36.9 °C) 100 19 131/72 85 95 % -- -- -- -- --   01/13/24 0700 -- 94 18 96/65 73 96 % -- -- -- -- --   01/13/24 0600 -- 100 18 111/57 74 87 % Abnormal  -- -- -- -- --   01/13/24 0500 -- 80 17 92/49 Abnormal  63 Abnormal  90 % -- -- -- -- --   01/13/24 0400 97.9 °F (36.6 °C) 74 19 86/50 Abnormal  63 Abnormal  88 % Abnormal  100 -- 50 L/min -- High flow nasal cannula   01/13/24 0303 -- -- -- -- -- 92 % -- -- -- -- --   01/13/24 0300 -- 80 21 97/57 69 84 % Abnormal  -- -- -- -- --   01/13/24 0229 -- -- -- -- -- 91 % -- -- -- -- --   01/13/24 0200 -- 84 24 Abnormal  126/70 95 94 % -- -- -- -- --   01/13/24 0100 -- 74 22 112/64 75 94 % -- -- -- -- --   01/13/24 0000 97.3 °F (36.3 °C) Abnormal  64 18 104/62 75 94 % 80 -- 50 L/min -- High flow nasal cannula     [place] Coma Scale  Date and Time Eye Opening Best Verbal Response Best Motor Response [place] Coma Scale Score   01/15/24 1200 4 4 6 14   01/15/24 0800 4 4 6 14   01/15/24 0400 4 4 6 14   01/15/24 0000 4 4 6 14   01/14/24 2000 4 4 6 14   01/14/24 1600 4 4 6 14   01/14/24 1200 4 4 6 14   01/14/24 0800 4 4 6 14   01/14/24 0400 4 4 6 14   01/13/24 2000 4 4 6 14   01/13/24 1600 4 4 6 14   01/13/24 1200 4 4 6 14   01/13/24 0800 4 4 6 14   01/13/24 0400 4 4 6 14   01/13/24 0000 4 4 6 14       Pertinent Labs/Diagnostic Results:   1/13 - CXR  Persistent bilateral multi lobar opacities.     1/14 - EKG  Normal sinus rhythm     Results from last 7 days   Lab Units 01/15/24  0502 01/14/24  0521 01/13/24  0506 01/12/24  0558 01/11/24  0454 01/10/24  0441  01/09/24 1926   WBC Thousand/uL 9.57 9.04 7.92 3.48* 2.91* 1.99* 2.26*   HEMOGLOBIN g/dL 11.1* 10.8* 11.6 11.4* 12.2 11.9 11.4*   HEMATOCRIT % 33.2* 33.8* 34.7* 34.2* 37.5 36.0 35.0   PLATELETS Thousands/uL 286 271 302 293 332 293 264   NEUTROS ABS Thousands/µL  --   --   --  3.06  --  1.71* 1.98   BANDS PCT % 6  --  8  --  7  --   --          Results from last 7 days   Lab Units 01/15/24  0502 01/14/24  0521 01/13/24  0506 01/12/24  0558 01/11/24  0454   SODIUM mmol/L 146 147 148* 150* 152*   POTASSIUM mmol/L 3.4* 3.2* 3.0* 3.3* 3.9   CHLORIDE mmol/L 116* 119* 114* 118* 120*   CO2 mmol/L 21 21 23 21 20*   ANION GAP mmol/L 9 7 11 11 12   BUN mg/dL 15 15 18 24 26*   CREATININE mg/dL 0.90 0.99 0.96 1.12 1.23   EGFR ml/min/1.73sq m 71 63 65 54 48   CALCIUM mg/dL 8.0* 7.8* 7.5* 7.8* 9.0   MAGNESIUM mg/dL 1.9 2.0 2.0 1.7* 2.1   PHOSPHORUS mg/dL 2.0* 1.9* 2.5* 2.5* 2.8     Results from last 7 days   Lab Units 01/14/24  0521 01/11/24  0454 01/10/24  1030 01/10/24  0441 01/09/24 1926   AST U/L 81* 97*  --  125* 135*   ALT U/L 85* 77*  --  87* 89*   ALK PHOS U/L 102 92  --  97 99   TOTAL PROTEIN g/dL 4.6* 5.8*  --  5.8* 5.7*   ALBUMIN g/dL 2.9* 3.4*  --  3.3* 3.3*   TOTAL BILIRUBIN mg/dL 0.55 0.53  --  0.59 0.70   AMMONIA umol/L  --   --  46  --   --      Results from last 7 days   Lab Units 01/15/24  1220 01/15/24  0855 01/14/24  2149 01/14/24  1747 01/14/24  1252 01/14/24  1137 01/14/24  0812 01/13/24  2140 01/13/24  1804 01/13/24  1225 01/13/24  0930 01/10/24  1126   POC GLUCOSE mg/dl 269* 180* 176* 135 214* 212* 150* 175* 108 216* 146* 117     Results from last 7 days   Lab Units 01/15/24  0502 01/14/24  0521 01/13/24  0506 01/12/24  0558 01/11/24  0454 01/11/24  0023 01/10/24  1809 01/10/24  1219 01/10/24  1030 01/10/24  0441 01/09/24  1926 01/09/24  0342   GLUCOSE RANDOM mg/dL 136 112 120 154* 185* 168* 155* 136 116 138 107 102         Results from last 7 days   Lab Units 01/09/24  0343   HEMOGLOBIN A1C % 6.6*   EAG  mg/dl 143      Results from last 7 days   Lab Units 01/11/24  0512   PH ART  7.350   PCO2 ART mm Hg 32.6*   PO2 ART mm Hg 76.7   HCO3 ART mmol/L 17.6*   BASE EXC ART mmol/L -7.0   O2 CONTENT ART mL/dL 17.1   O2 HGB, ARTERIAL % 94.0      Results from last 7 days   Lab Units 01/09/24 1926   CK TOTAL U/L 314*         Results from last 7 days   Lab Units 01/09/24 1926   D-DIMER QUANTITATIVE ug/ml FEU 1.98*     Results from last 7 days   Lab Units 01/12/24  0558 01/11/24  1519   PROTIME seconds  --  16.3*   INR   --  1.32*   PTT seconds 32  --      Results from last 7 days   Lab Units 01/12/24  0558   TSH 3RD GENERATON uIU/mL 4.570*     Results from last 7 days   Lab Units 01/14/24  0521 01/11/24  0611 01/10/24  0441 01/09/24 1926 01/09/24  0335   PROCALCITONIN ng/ml 0.32* 0.37* 0.60* 0.66* 0.63*     Results from last 7 days   Lab Units 01/11/24  0454 01/09/24 1926 01/09/24  0335   LACTIC ACID mmol/L 1.3 0.9 1.0      Results from last 7 days   Lab Units 01/09/24 2036   HEP B S AG  Non-reactive   HEP C AB  Non-reactive   HEP B C IGM  Non-reactive   HEP B C TOTAL AB  Non-reactive      Results from last 7 days   Lab Units 01/14/24  0521 01/11/24  0454 01/10/24  0441 01/09/24  1926   CRP mg/L 21.9* 76.6* 110.4* 88.6*      Results from last 7 days   Lab Units 01/10/24  2115   STREP PNEUMONIAE ANTIGEN, URINE  Negative   LEGIONELLA URINARY ANTIGEN  Negative          Results from last 7 days   Lab Units 01/12/24 0408 01/09/24 2039 01/09/24 2035   BLOOD CULTURE   --  No Growth After 5 Days. No Growth After 5 Days.   GRAM STAIN RESULT  No No Polys or Bacteria seen  --   --          Results from last 7 days   Lab Units 01/12/24 0408   APPEARANCE CSF  light pink   TUBE NUM CSF  2   WBC CSF /uL 1   XANTHOCHROMIA  No   CSF CULTURE  No growth     Results from last 7 days   Lab Units 01/11/24  0454   VANCOMYCIN RM ug/mL 28.7*       Medications:   Scheduled Medications:  acyclovir, 10 mg/kg (Ideal), Intravenous, Q8H  Albumin 5%,  25 g, Intravenous, Once  busPIRone, 5 mg, Oral, BID  chlorhexidine, 15 mL, Mouth/Throat, Q12H SARTHAK  dexamethasone, 0.1 mg/kg, Intravenous, Q12H  [START ON 1/16/2024] enoxaparin, 40 mg, Subcutaneous, Daily  insulin lispro, 1-5 Units, Subcutaneous, HS  insulin lispro, 1-6 Units, Subcutaneous, TID AC  lamoTRIgine, 150 mg, Oral, BID  magnesium sulfate, 2 g, Intravenous, Once  polyethylene glycol, 17 g, Oral, Daily  remdesivir, 100 mg, Intravenous, Q24H  senna-docusate sodium, 2 tablet, Oral, BID  topiramate, 100 mg, Oral, BID  venlafaxine, 25 mg, Oral, TID With Meals      Continuous IV Infusions:  dexmedetomidine, 0.1-1.5 mcg/kg/hr, Intravenous, Titrated      PRN Meds:  acetaminophen, 650 mg, Oral, Q6H PRN  bisacodyl, 10 mg, Rectal, Daily PRN  multi-electrolyte, 1,000 mL bolus, Intravenous, 1/15 x1  ondansetron, 4 mg, Intravenous, Q6H PRN  potassium chloride, 20 mEq, Intravenous; 1/13 x2, 1/14 x1, 1/15 x1  potassium phosphates, 15 mmol, Intravenous; 1/15 x1  potassium-sodium phosphates, 1 packet, Oral, 1/14 x1    Discontinued Meds:  atorvastatin (LIPITOR) tablet 20 mg  Dose: 20 mg  Freq: Daily with dinner Route: PO  Start: 01/11/24 1630 End: 01/14/24 0430   cefTRIAXone (ROCEPHIN) 2,000 mg in dextrose 5 % 50 mL IVPB  Dose: 2,000 mg  Freq: Every 24 hours Route: IV  Indications of Use: MENINGITIS  Last Dose: Stopped (01/15/24 0604)  Start: 01/13/24 0600 End: 01/15/24 1433   dornase natacha (PULMOZYME) inhalation solution 2.5 mg  Dose: 2.5 mg  Freq: 2 times daily (RESP) Route: IN  Start: 01/13/24 0800 End: 01/15/24 1113   enoxaparin (LOVENOX) subcutaneous injection 30 mg  Dose: 30 mg  Freq: Every 12 hours scheduled Route: SC  Start: 01/13/24 0900 End: 01/15/24 1120   OLANZapine (ZyPREXA ZYDIS) dispersible tablet 10 mg  Dose: 10 mg  Freq: 2 times daily Route: PO  Start: 01/13/24 2100 End: 01/15/24 1111   sodium chloride infusion 0.45 %  Rate: 100 mL/hr Dose: 100 mL/hr  Freq: Continuous Route: IV  Last Dose: Stopped (01/14/24  2230)  Start: 01/11/24 0145 End: 01/14/24 2212       Discharge Plan: TBD    Network Utilization Review Department  ATTENTION: Please call with any questions or concerns to [phone] and carefully listen to the prompts so that you are directed to the right person. All voicemails are confidential.   For Discharge needs, contact Care Management DC Support Team at [phone] opt. [phone]  Send all requests for admission clinical reviews, approved or denied determinations and any other requests to dedicated fax number below belonging to the campus where the patient is receiving treatment. List of dedicated fax numbers for the Facilities:  FACILITY NAME UR FAX NUMBER   ADMISSION DENIALS (Administrative/Medical Necessity) [phone]   DISCHARGE SUPPORT TEAM (NETWORK) [phone]   PARENT CHILD HEALTH (Maternity/NICU/Pediatrics) [phone]   [facility] [phone]   [facility] [phone]   [facility] [phone]   [facility] [phone]   [facility] [phone]   [facility] [phone]   [facility] [phone]   [facility] [phone]   [facility] [phone]   [facility] [phone]   [facility] [phone]

## 2024-01-15 NOTE — PROGRESS NOTES
Progress Note - Infectious Disease   Carla Hunt 58 y.o. female MRN: [id number]  Unit/Bed#: ICU 13 Encounter: [id number]      Impression/Recommendations:  SIRS.  If patient does indeed had seizure, SIRS may be all secondary to it.  Consideration for CNS infection, as in below.  Consideration for COVID as bacterial pneumonia contributing to this.  Patient is overall clinically proved and she remains systemically nontoxic.  Temperature is down.  WBC normalized.  Admission blood cultures have no growth.  Antibiotic and antiviral plan as in below.    Monitor temperature/WBC.     Encephalitis versus encephalopathy.  Head CT and MRI did not show acute CVA or any mass lesion.  There is no seizure on EEG monitoring now but it is unclear whether patient may have a seizure during recent hospitalization at [facility] prior to transfer.  There was no clinical evidence of bacterial meningitis but HSV encephalitis is possible.  Also, consider encephalopathy/encephalitis from COVID.  Consider postictal state.  Patient's mental status is improved.  She is status post lumbar puncture.  Unfortunately, due to her agitation, only a small amount of CSF was obtained.  CSF protein was only 1.  CSF Gram stain was negative, with no growth in culture.  Even with patient having received 2 days of antibiotic, especially given absence of signs and symptoms of bacterial meningitis, doubt that patient has bacterial meningitis with absence of CSF pleocytosis.  However, HSV encephalitis is still possibility. M/E PCR panel negative but HSV PCR in I am concerned about the possibility of false negative study.  Unfortunately, there was not enough CSF to obtain HSV PCR.  In addition, due to patient's agitation, there is no plan for safe repeat LP.  Given no other plausible explanation for encephalopathy, will continue IV acyclovir for now.  Continue IV acyclovir for now.  Treat x 2-3 week course.  Monitor mental status.     Severe COVID,  with acute hypoxic respiratory failure.  Patient is currently on remdesivir and dexamethasone and did receive 1 dose of Tocilizumab.  CXR with relatively mild opacities.  With elevated procalcitonin, there is concern of possible concurrent bacterial pneumonia.  Patient did receive initial rounds of COVID vaccination but not the new COVID vaccination available this past fall.  Procalcitonin decreasing.  Patient completed remdesivir course.  Patient is completing antibiotic course for possible bacterial pneumonia.  Continue dexamethasone.  Continue ceftriaxone.  Complete 7-day course today.  Monitor respiratory symptoms and O2 saturation.     Acute hypoxic respiratory failure, likely multifactorial, with COVID contributing.  Respiratory status stable.  O2 support is stable, with patient remaining on high flow O2 support.  O2 support per critical service.     CKD.  Creatinine improving.  Antibiotic and antiviral dosages adjusted accordingly.  Monitor creatinine.     Seizure disorder, status post temporal lobe resection in .  There is no active seizure on EEG monitoring at present but it is unclear whether patient may have had seizure earlier.  Neurology follow-up.     B-cell lymphoma, on chemotherapy.  Patient is leukopenic but not neutropenic.  Monitor WBC/ANC.     Discussed with patient.    Antibiotics:  Acyclovir # 5  Ceftriaxone # 7    Subjective:  Patient remains in ICU.  She is more awake and alert, but remains confused.  She remains on high flow O2 supplement.  Temperature stays down.  No chills.  She is tolerating antibiotic well.  No nausea, vomiting or diarrhea.    Objective:  Vitals:  Temp:  [98.3 °F (36.8 °C)-99 °F (37.2 °C)] 98.5 °F (36.9 °C)  HR:  [48-90] 90  Resp:  [17-27] 20  BP: ()/(49-98) 84/58  SpO2:  [80 %-98 %] 93 %  Temp (24hrs), Av.6 °F (37 °C), Min:98.3 °F (36.8 °C), Max:99 °F (37.2 °C)  Current: Temperature: 98.5 °F (36.9 °C)    Physical Exam:     General: Awake, alert, remains  confused, comfortable, nontoxic.   Neck:  Supple. No mass.  No lymphadenopathy.   Lungs: Expansion symmetric, diffuse rhonchi, no rales, no wheezing, respirations labored.   Heart:  Regular rate and rhythm, S1 and S2 normal, no murmur.   Abdomen: Soft, nondistended, non-tender, bowel sounds active all four quadrants, no masses, no organomegaly.   Extremities: Trace leg edema. No erythema/warmth. No ulcer. Nontender to palpation.   Skin:  No rash.   Neuro: Moves all extremities.     Invasive Devices       Peripheral Intravenous Line  Duration             Peripheral IV 01/11/24 Distal;Right;Upper;Ventral (anterior) Arm 4 days    Peripheral IV 01/11/24 Right Forearm 4 days    Peripheral IV 01/13/24 Proximal;Right;Upper;Ventral (anterior) Arm 2 days    Peripheral IV 01/15/24 Left;Upper Arm <1 day                    Labs studies:   I have personally reviewed pertinent labs.  Results from last 7 days   Lab Units 01/15/24  0502 01/14/24  0521 01/13/24  0506 01/12/24  0558 01/11/24  0454 01/10/24  1030 01/10/24  0441   POTASSIUM mmol/L 3.4* 3.2* 3.0*   < > 3.9   < > 4.0   CHLORIDE mmol/L 116* 119* 114*   < > 120*   < > 121*   CO2 mmol/L 21 21 23   < > 20*   < > 18*   BUN mg/dL 15 15 18   < > 26*   < > 21   CREATININE mg/dL 0.90 0.99 0.96   < > 1.23   < > 1.20   EGFR ml/min/1.73sq m 71 63 65   < > 48   < > 50   CALCIUM mg/dL 8.0* 7.8* 7.5*   < > 9.0   < > 8.8   AST U/L  --  81*  --   --  97*  --  125*   ALT U/L  --  85*  --   --  77*  --  87*   ALK PHOS U/L  --  102  --   --  92  --  97    < > = values in this interval not displayed.     Results from last 7 days   Lab Units 01/15/24  0502 01/14/24  0521 01/13/24  0506   WBC Thousand/uL 9.57 9.04 7.92   HEMOGLOBIN g/dL 11.1* 10.8* 11.6   PLATELETS Thousands/uL 286 271 302     Results from last 7 days   Lab Units 01/12/24  0408 01/10/24  2115 01/09/24  2039 01/09/24  2035   BLOOD CULTURE   --   --  No Growth After 5 Days. No Growth After 5 Days.   GRAM STAIN RESULT  No No  Polys or Bacteria seen  --   --   --    MRSA CULTURE ONLY   --   --  No Methicillin Resistant Staphlyococcus aureus (MRSA) isolated  --    LEGIONELLA URINARY ANTIGEN   --  Negative  --   --        Imaging Studies:   I have personally reviewed pertinent imaging study reports and images in PACS.    EKG, Pathology, and Other Studies:   I have personally reviewed pertinent reports.

## 2024-01-15 NOTE — PLAN OF CARE
Problem: Nutrition/Hydration-ADULT  Goal: Nutrient/Hydration intake appropriate for improving, restoring or maintaining nutritional needs  Description: Monitor and assess patient's nutrition/hydration status for malnutrition. Collaborate with interdisciplinary team and initiate plan and interventions as ordered.  Monitor patient's weight and dietary intake as ordered or per policy. Utilize nutrition screening tool and intervene as necessary. Determine patient's food preferences and provide high-protein, high-caloric foods as appropriate.     INTERVENTIONS:  - Monitor oral intake, urinary output, labs, and treatment plans  - Assess nutrition and hydration status and recommend course of action  - Evaluate amount of meals eaten  - Assist patient with eating if necessary   - Allow adequate time for meals  - Recommend/ encourage appropriate diets, oral nutritional supplements, and vitamin/mineral supplements  - Order, calculate, and assess calorie counts as needed  - Recommend, monitor, and adjust tube feedings and TPN/PPN based on assessed needs  - Assess need for intravenous fluids  - Provide specific nutrition/hydration education as appropriate  - Include patient/family/caregiver in decisions related to nutrition  Outcome: Progressing     Problem: SAFETY ADULT  Goal: Patient will remain free of falls  Description: INTERVENTIONS:  - Educate patient/family on patient safety including physical limitations  - Instruct patient to call for assistance with activity   - Consult OT/PT to assist with strengthening/mobility   - Keep Call bell within reach  - Keep bed low and locked with side rails adjusted as appropriate  - Keep care items and personal belongings within reach  - Initiate and maintain comfort rounds  - Make Fall Risk Sign visible to staff  - Offer Toileting every 2 Hours, in advance of need  - Initiate/Maintain bed/chair alarm  - Obtain necessary fall risk management equipment:  - Apply yellow socks and  bracelet for high fall risk patients  - Consider moving patient to room near nurses station  Outcome: Progressing

## 2024-01-15 NOTE — PLAN OF CARE
Problem: PHYSICAL THERAPY ADULT  Goal: Performs mobility at highest level of function for planned discharge setting.  See evaluation for individualized goals.  Description:    Equipment Recommended:  (none)       See flowsheet documentation for full assessment, interventions and recommendations.  Note: Prognosis: Good  Problem List: Decreased strength, Decreased endurance, Impaired balance, Decreased mobility  Assessment: Pt is a 58 y.o. female presenting to [facility] on  1/10/2024 1941 w/ primary condition of stroke like symptoms and epilepsy, secondary to acute encephalopathy. Pt's PMH is significant for seizure s/p L parietal lobectomy in 2007, CKD stage 3, continued epilepsy, B-cell lymphoma, and positive for COVID on 1/7/2024. Pt's PLOF is independent for ADLs and iADLs. Pt's home set up consists of a 2 level house where she lives with her , daughter, and son. Upon evaluation today, pt presents with the following impairments: decreased strength, decreased activity tolerance, decreased endurance, and decreased balance. Pt's activity limitations include ADLs, ambulation, and stairs. Pt was able to perform supine to sit w/ Min x 1 and sit to stand and stand pivot transfer with Mod x 2. Pt would benefit from skilled PT 3-5/week for 6 weeks in a rehab. Pt will continue to be seen here during admission in order to address these impairments and activity limitations. Pt's prognosis is Good secondary to age, PLOF, and support system.   The patient's -[facility] Basic Mobility Inpatient Standardized Score is less than 42.9, suggesting this patient may benefit from discharge to post-acute rehabilitation services. Please also refer to the recommendation of the Physical Therapist for safe discharge planning.        Rehab Resource Intensity Level, PT: II (Moderate Resource Intensity)    See flowsheet documentation for full assessment.

## 2024-01-15 NOTE — PHYSICAL THERAPY NOTE
"   PT Evaluation       01/15/24 0845   PT Last Visit   PT Visit Date 01/15/24   Note Type   Note type Evaluation   Pain Assessment   Pain Assessment Tool 0-10   Pain Score No Pain   Restrictions/Precautions   Weight Bearing Precautions Per Order No   Braces or Orthoses   (none)   Other Precautions Seizure;Airborne/isolation;Chair Alarm;Fall Risk;Contact/isolation   Home Living   Type of Home House   Home Layout Two level;1/2 bath on main level;Bed/bath upstairs  (3 clif, full flight to second floor)   Bathroom Shower/Tub Walk-in shower   Bathroom Toilet Standard   Home Equipment   (none)   Prior Function   Level of Foster Independent with ADLs;Independent with functional mobility;Independent with IADLS   Lives With Daughter;Son;Spouse   Receives Help From Family   IADLs   (independent)   Falls in the last 6 months 0   Vocational Volunteer work   General   Additional Pertinent History Pt's PMH is significant for seizure s/p L parietal lobectomy in 2007, CKD stage 3, continued epilepsy, B-cell lymphoma, and positive for COVID on 1/7/2024.   Family/Caregiver Present No   Cognition   Overall Cognitive Status Impaired   Orientation Level Oriented to person;Oriented to place  (Had the correct day and year, but believed it was february)   Following Commands Follows all commands and directions without difficulty   Subjective   Subjective \"I want to be able to attend my daughters wedding.\"   RUE Assessment   RUE Assessment   (See OT eval)   LUE Assessment   LUE Assessment   (See OT eval)   RLE Assessment   RLE Assessment   (Weakness, required assistance during mobility. Grossly 3/5)   LLE Assessment   LLE Assessment   (Weakness, required assistance during mobility. Grossly 3/5)   Coordination   Movements are Fluid and Coordinated 0   Coordination and Movement Description slow and guarded   Bed Mobility   Supine to Sit 4  Minimal assistance   Additional items Assist x 1   Sit to Supine Unable to assess   Additional " Comments   (Pt was received laying supine in bed and was left reclined in a chair w/ chair alarm.)   Transfers   Sit to Stand 3  Moderate assistance   Additional items Assist x 2   Stand to Sit 3  Moderate assistance   Additional items Assist x 2   Ambulation/Elevation   Gait pattern Decreased foot clearance;Shuffling;Short stride;Ataxia;Excessively slow;Improper Weight shift   Gait Assistance 3  Moderate assist   Additional items Assist x 2   Assistive Device Other (Comment)  (hand held)   Distance 3 ft   Balance   Static Sitting Poor +   Dynamic Sitting Poor +   Static Standing Poor   Dynamic Standing Poor   Endurance Deficit   Endurance Deficit Yes   Endurance Deficit Description pt reports fatigue after transfer from bed to chair   Activity Tolerance   Activity Tolerance Treatment limited secondary to medical complications (Comment)  (Also limited due to fatigue)   Medical Staff Made Aware OT   Nurse Made Aware yes   Assessment   Prognosis Good   Problem List Decreased strength;Decreased endurance;Impaired balance;Decreased mobility   Assessment Pt is a 58 y.o. female presenting to [facility] on  1/10/2024 1941 w/ primary condition of stroke like symptoms and epilepsy, secondary to acute encephalopathy. Pt's PMH is significant for seizure s/p L parietal lobectomy in 2007, CKD stage 3, continued epilepsy, B-cell lymphoma, and positive for COVID on 1/7/2024. Pt's PLOF is independent for ADLs and iADLs. Pt's home set up consists of a 2 level house where she lives with her , daughter, and son. Upon evaluation today, pt presents with the following impairments: decreased strength, decreased activity tolerance, decreased endurance, and decreased balance. Pt's activity limitations include ADLs, ambulation, and stairs. Pt was able to perform supine to sit w/ Min x 1 and sit to stand and stand pivot transfer with Mod x 2. Pt would benefit from skilled PT 3-5/week for 6 weeks in a rehab. Pt will continue to  be seen here during admission in order to address these impairments and activity limitations. Pt's prognosis is Good secondary to age, PLOF, and support system.   The patient's AM-PAC Basic Mobility Inpatient Standardized Score is less than 42.9, suggesting this patient may benefit from discharge to post-acute rehabilitation services. Please also refer to the recommendation of the Physical Therapist for safe discharge planning.   Goals   Patient Goals to return to PLOF by daughter's wedding   LTG Expiration Date 01/29/24   Long Term Goal #1 1) Perform bed mobility mod-I to participate in frequent repositioning and improve skin integrity  2) Perform functional transfers mod-I to promote I with OOB mobility  3) Improve b/l LE strength by 1/2 grade in order to improve efficiency of tranfers  4) Improve sitting and standing balance by 1 grade to improve safety and independence   Discharge Recommendation   Rehab Resource Intensity Level, PT II (Moderate Resource Intensity)   Equipment Recommended   (none)   AM-PAC Basic Mobility Inpatient   Turning in Flat Bed Without Bedrails 3   Lying on Back to Sitting on Edge of Flat Bed Without Bedrails 3   Moving Bed to Chair 2   Standing Up From Chair Using Arms 2   Walk in Room 1   Climb 3-5 Stairs With Railing 1   Basic Mobility Inpatient Raw Score 12   Basic Mobility Standardized Score 32.23   Highest Level Of Mobility   JH-HLM Goal 4: Move to chair/commode   JH-HLM Achieved 4: Move to chair/commode     Karlene Mata, SPT

## 2024-01-15 NOTE — RESPIRATORY THERAPY NOTE
01/15/24 0809   Respiratory Assessment   Assessment Type During-treatment   General Appearance Awake   Respiratory Pattern Normal   Chest Assessment Chest expansion symmetrical   Bilateral Breath Sounds Clear;Diminished   Resp Comments Recieved pt on 80% and 50L. Upon assessment, pt sat 84%. bilateral BS clear/diminshed. Treatment given. Increased FIO2 to 100%. No distress noted   O2 Device hfnc   Additional Assessments   Pulse (!) 51   Respirations 20   SpO2 93 %   Position Semi-Barney's

## 2024-01-15 NOTE — SPEECH THERAPY NOTE
"Speech Language/Pathology  Speech-Language Pathology Progress Note      Patient Name: Carla Hunt    Today's Date: 1/15/2024      Subjective:  Pt was awake and alert. She was sitting up in chair at bedside. Patient stated \"I think that would be good\". Pt more awake, alert.  Some mild confusion intermittently but able to have conversation w/ few breaks or redirection.     Objective:  Pt was seen today for dysphagia therapy. Current diet is dysphagia 2 mechanical soft with thin liquids. Pt was on high flow O2. Oral care had already been completed. Focus of today's session was to maximize PO intake safety and determine potential for diet texture advancement. Textures offered today included 8 oz thin juice by straw, full piece toast..  Swallow function:   Pt encouraged to self fed, took adequate bites, fairly controlled.  Mastication is mildly prolonged though effective.  Able to clear the oral cavity and requested alternated sips thin when dry.  No oral residue w/ the solids.  Pt requested the applesauce on the tray, self fed that as well.    She was able to slowly self fed.     Assessment:  Swallow function is improving with current diet. Appropriate for an upgrade.        Plan:  Change diet texture to dysphagia 3 dental soft. Continue ST follow up. Subsequent sessions to focus on determining potential for diet texture advancement, improving pt's self monitoring, and improving use of strategies to minimize risk of aspiration.  "

## 2024-01-15 NOTE — PROGRESS NOTES
[facility]  Progress Note: Critical Care  Name: Carla Hunt 58 y.o. female I MRN: [id number]  Unit/Bed#: ICU 13 I Date of Admission: 1/10/2024   Date of Service: 1/15/2024 I Hospital Day: 5    Assessment/Plan   Neuro:   Diagnosis: Acute Encephalopathy   ? Covid vs subclinical seizures (stare) vs CNS  MRI-Previously visualized diffusion signal abnormality in the right basal ganglia is no longer identified and may have been artifactual given underlying susceptibility artifact in the bilateral globus pallidus.   LP-cx/gm stain neg, ME panel neg, WBC 1  Plan:   Precedex gtt 1.2  Zyprexa BID  Per ID cont acyclovir and attempt LP HSV when appropriate per IR may need anesthesia  Diagnosis: Hx Seizure dx s/p Tem[facility]  Plan:            vEEG no seizures d/c 1/13  Home lamictal/Topamax  Diagnosis: Depression/anxiety  Plan:   Resume Home buspar/lexapro  On home effexor   CV:   No active issues     Pulm:  Diagnosis: Acute hypoxic resp failure 2/2 multifocal pna/covid  CXR-mild improvement in L infiltrates  BC 1/9 NG, leg/strep/MRSA neg  PCT 0.6-0.3-trend  Plan:   Wean HFNC 75/40 for sats>92%  Ceftx D7/7  Diagnosis: Covid 19  Actemra 1/9  -67-34-trend  Plan:   Severe Pathway  Steroids increased 1/13 to 0.1mg/kg D3/10  Remdesivir D7/10  Dornase  Enoxaparin BID  GI:   Diagnosis: Dysphagia  Plan: Speech following,      :   No active issues     F/E/N:   Diagnosis: Hypernatremia/Hypokalemia/hypophos  Plan:   F/u am bmp  IVF d/c     Heme/Onc:   Diagnosis: Hx B Cell lymphoma w bone mets on chemo  Plan: outpt f/u  maintenance rituxan hycela until 5/2024 plan     Endo:   No active issues     ID:   See pulm. Bandemia 8 on am labs 1/13. F/u am diff. If spikes temp recx     MSK/Skin:   No active issues    Disposition: Stepdown Level 1    ICU Core Measures     A: Assess, Prevent, and Manage Pain Has pain been assessed? Yes  Need for changes to pain regimen? No   B:  Both SAT/SAT  N/A   C: Choice of Sedation RASS Goal: 0 Alert and Calm  Need for changes to sedation or analgesia regimen? No   D: Delirium CAM-ICU: Unable to perform secondary to Acute cognitive dysfunction   E: Early Mobility  Plan for early mobility? Yes   F: Family Engagement Plan for family engagement today? Yes       Antibiotic Review: Infectious disease consulted    Review of Invasive Devices:            Prophylaxis:  VTE VTE covered by:  enoxaparin, Subcutaneous, 30 mg at 01/14/24 2222       Stress Ulcer  not ordered        Significant 24hr Events     24hr events: No events     Subjective     Review of Systems   Unable to perform ROS: Mental status change        Objective                            Vitals I/O      Most Recent Min/Max in 24hrs   Temp 98.6 °F (37 °C) Temp  Min: 98.3 °F (36.8 °C)  Max: 99.3 °F (37.4 °C)   Pulse (!) 50 Pulse  Min: 50  Max: 88   Resp 20 Resp  Min: 17  Max: 28   /78 BP  Min: 98/54  Max: 155/88   O2 Sat 92 % SpO2  Min: 86 %  Max: 98 %      Intake/Output Summary (Last 24 hours) at 1/15/2024 0412  Last data filed at 1/15/2024 0300  Gross per 24 hour   Intake 3384.8 ml   Output 3175 ml   Net 209.8 ml       Diet Dysphagia/Modified Consistency; Dysphagia 2-Mechanical Soft; Thin Liquid    Invasive Monitoring           Physical Exam   Physical Exam  Eyes:      Pupils: Pupils are equal, round, and reactive to light.   Skin:     General: Skin is warm and dry.   HENT:      Head: Normocephalic and atraumatic.      Mouth/Throat:      Mouth: Mucous membranes are moist.   Cardiovascular:      Rate and Rhythm: Normal rate and regular rhythm.      Pulses: Normal pulses.      Heart sounds: Normal heart sounds.   Musculoskeletal:         General: No swelling. Normal range of motion.      Right lower leg: No edema.      Left lower leg: No edema.   Abdominal: General: Bowel sounds are normal. There is no distension.      Palpations: Abdomen is soft.      Tenderness: There is no abdominal  tenderness.   Constitutional:       General: She is not in acute distress.  Pulmonary:      Effort: Pulmonary effort is normal. No respiratory distress.      Breath sounds: Normal breath sounds.   Neurological:      General: No focal deficit present.      Mental Status: She is alert. She is disoriented to time and disoriented to situation.      GCS: GCS eye subscore is 4. GCS verbal subscore is 5. GCS motor subscore is 6.      Motor: Strength full and intact in all extremities. No motor deficit.      Comments: [id number]. Pt attempts to hold conversation however falls off topic            Diagnostic Studies      EKG: tele  Imaging:  I have personally reviewed pertinent reports.   and I have personally reviewed pertinent films in PACS     Medications:  Scheduled PRN   acyclovir, 10 mg/kg (Ideal), Q12H  cefTRIAXone, 2,000 mg, Q24H  chlorhexidine, 15 mL, Q12H SARTHAK  dexamethasone, 0.1 mg/kg, Q12H  dornase natacha, 2.5 mg, BID  enoxaparin, 30 mg, Q12H SARTHAK  insulin lispro, 1-5 Units, HS  insulin lispro, 1-6 Units, TID AC  lamoTRIgine, 150 mg, BID  OLANZapine, 10 mg, BID  polyethylene glycol, 17 g, Daily  remdesivir, 100 mg, Q24H  senna-docusate sodium, 2 tablet, BID  topiramate, 100 mg, BID  venlafaxine, 25 mg, TID With Meals      acetaminophen, 650 mg, Q6H PRN  bisacodyl, 10 mg, Daily PRN  ondansetron, 4 mg, Q6H PRN       Continuous    dexmedetomidine, 0.1-1.5 mcg/kg/hr, Last Rate: 0.4 mcg/kg/hr (01/14/24 1039)         Labs:    CBC    Recent Labs     01/13/24  0506 01/14/24  0521   WBC 7.92 9.04   HGB 11.6 10.8*   HCT 34.7* 33.8*    271   BANDSPCT 8  --      BMP    Recent Labs     01/13/24  0506 01/14/24  0521   SODIUM 148* 147   K 3.0* 3.2*   * 119*   CO2 23 21   AGAP 11 7   BUN 18 15   CREATININE 0.96 0.99   CALCIUM 7.5* 7.8*       Coags    No recent results     Additional Electrolytes  Recent Labs     01/13/24  0506 01/14/24  0521   MG 2.0 2.0   PHOS 2.5* 1.9*          Blood Gas    No recent results  No  recent results LFTs  Recent Labs     01/14/24  0521   ALT 85*   AST 81*   ALKPHOS 102   ALB 2.9*   TBILI 0.55       Infectious  Recent Labs     01/14/24  0521   PROCALCITONI 0.32*     Glucose  Recent Labs     01/13/24  0506 01/14/24  0521   GLUC 120 112               Non-Critical Care Time Statement: I have spent a total time of 25 minutes in caring for this patient including Diagnostic results, Documenting in the medical record, and Reviewing / ordering tests, medicine, procedures  .     DANYELLE Carrillo

## 2024-01-15 NOTE — OCCUPATIONAL THERAPY NOTE
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, intractable, without status epilepticus (HCC)    Stroke-like symptoms    Past Medical History  Past Medical History:   Diagnosis Date    Hx of hypercalcemia     Lymphoma (HCC) 2021    Parathyroid disease (HCC)     Seizures (HCC)     Situational depression     24 yr old son  from drug overdose     Past Surgical History  Past Surgical History:   Procedure Laterality Date    CRANIOTOMY FOR TEMPORAL LOBECTOMY Left 2007             01/15/24 0850   OT Last Visit   OT Visit Date 01/15/24   Note Type   Note type Evaluation   Pain Assessment   Pain Assessment Tool 0-10   Pain Score No Pain   Restrictions/Precautions   Weight Bearing Precautions Per Order No   Other Precautions Chair Alarm;Multiple lines;Telemetry;Fall Risk;Pain;Cognitive;O2;Contact/isolation;Airborne/isolation   Home Living   Type of Home House   Home Layout Stairs to enter with rails;Two level;1/2 bath on main level;Bed/bath upstairs   Bathroom Shower/Tub Walk-in shower   Bathroom Toilet Standard   Bathroom Equipment Hand-held shower   Bathroom Accessibility Accessible   Additional Comments Pt lives in a 2 story home with  3 SENTHIL. FFOS to second floor however does have 1/2 bath on main level. Denies use of DME PTA   Prior Function   Level of [place] Independent with ADLs;Independent with functional mobility;Independent with IADLS   Lives With Family;Spouse;Daughter;Son   Receives Help From Family   IADLs Independent with driving;Independent with meal prep;Independent with medication management   Falls in the last 6 months 0   Vocational Volunteer work   Lifestyle   Autonomy I with ADLS and IADLS +    Reciprocal Relationships supportive spouse and kids, DTR 27yo, son 15yo  "  Service to Others volunteers at [employer] organization   Intr[employer] Gratification volunteering, watching her son play football   Subjective   Subjective \"I got go get to my daughters wedding\"   ADL   Where Assessed Chair   Eating Assistance 4  Minimal Assistance   Grooming Assistance 4  Minimal Assistance   UB Bathing Assistance 3  Moderate Assistance   LB Bathing Assistance 2  Maximal Assistance   UB Dressing Assistance 3  Moderate Assistance   LB Dressing Assistance 2  Maximal Assistance   Toileting Assistance  2  Maximal Assistance   Bed Mobility   Supine to Sit 4  Minimal assistance   Additional items Assist x 1   Additional Comments OOB at end of session   Transfers   Sit to Stand 3  Moderate assistance   Additional items Assist x 2   Stand to Sit 3  Moderate assistance   Additional items Assist x 2   Additional Comments BL HHA   Functional Mobility   Functional Mobility 3  Moderate assistance   Additional Comments Ax2 to chair only   Additional items Hand hold assistance   Balance   Static Sitting Fair   Dynamic Sitting Fair -   Static Standing Poor +   Dynamic Standing Poor   Ambulatory Poor   Activity Tolerance   Activity Tolerance Patient limited by fatigue   Medical Staff Made Aware DPT, NSG aware   Nurse Made Aware yes, cleared for OTIE   RUE Assessment   RUE Assessment WFL   LUE Assessment   LUE Assessment WFL   Hand Function   Gross Motor Coordination Functional   Fine Motor Coordination Functional   Sensation   Light Touch No apparent deficits   Sharp/Dull No apparent deficits   Vision - Complex Assessment   Additional Comments Denies any acute visual changes   Psychosocial   Psychosocial (WDL) WDL   Cognition   Overall Cognitive Status Impaired   Arousal/Participation Alert;Responsive;Cooperative   Attention Attends with cues to redirect   Orientation Level Oriented to person;Oriented to place;Disoriented to time;Disoriented to situation   Memory Decreased recall of precautions   Following Commands Follows " one step commands with increased time or repetition   Comments Overall pleasant and cooperative, decreased insight, safety awareness and general confusion   Assessment   Limitation Decreased ADL status;Decreased UE strength;Decreased Safe judgement during ADL;Decreased endurance;Decreased self-care trans;Decreased high-level ADLs   Prognosis Good   Assessment Pt is a 58 y.o. female seen for OT evaluation s/p admit to [facility] on 1/10/2024 w/ Encephalopathy acute.  Comorbidities affecting pt's functional performance at time of assessment include:  has a past medical history of hypercalcemia, Lymphoma (HCC), Parathyroid disease (HCC), Seizures (HCC), and Situational depression.. Personal factors affecting pt at time of IE include:steps to enter environment, difficulty performing ADLS, difficulty performing IADLS , limited insight into deficits, flat affect, decreased initiation and engagement , and health management . Prior to admission, pt was  I with ADLS and IADLS. Upon evaluation: Pt presents supine and is agreeable to OTIE, all vitals WNLS.  Pt requires overall mod a x 2 2* the following deficits impacting occupational performance: weakness, decreased strength, decreased balance, decreased tolerance, impaired attention, impaired sequencing, impaired problem solving, impulsivity, and decreased coping skills. Pt resting in chair at end of session with all needs in reach, alarm on, all lines in place and SCD's on. Pt to benefit from continued skilled OT tx while in the hospital to address deficits as defined above and maximize level of functional independence w ADL's and functional mobility. Occupational Performance areas to address include: eating, grooming, bathing/shower, toilet hygiene, dressing, health maintenance, functional mobility, community mobility, and clothing management. The patient's raw score on the AM-PAC Daily Activity inpatient short form is  , standardized score is  , less than 39.4. Patients at this  level are likely to benefit from discharge to post-acute rehabilitation services. Please refer to the recommendation of the Occupational Therapist for safe discharge planning.   Goals   Patient Goals To go home   LTG Time Frame 10-14   Long Term Goal #1 1) Pt will complete UB TE to increase strength in order to improve overall functional abilities     2) Pt will increase activity tolerance to G for 30 min txment sessions   3) Pt will complete UB/LB self care w/ mod I using adaptive device and DME as needed    4) Pt will complete toileting w/ mod I w/ G hygiene/thoroughness using DME as needed    5) Pt will improve functional transfers to Mod I on/off all surfaces using DME as needed w/ G balance/safety     6) Pt will improve functional mobility during ADL/IADL/leisure tasks to Mod I using DME as needed w/ G balance/safety     7) Pt will participate in simulated IADL management task with DME as needed to increase independence to  w/ G safety and endurance    8) Pt will independently identify and utilize 2-3 coping strategies to increase positive affect and promote overall well-being.    9) Pt will demonstrate 100% carryover of Spinal precautions and  energy conservation techniques t/o functional I/ADL/leisure tasks w/o cues s/p skilled education 10) Pt will engage in ongoing cognitive assessment w/ G participation to assist w/ safe d/c planning/recommendations   Plan   Treatment Interventions ADL retraining;Functional transfer training;UE strengthening/ROM;Endurance training;Patient/family training;Equipment evaluation/education;Compensatory technique education;Continued evaluation;Energy conservation;Activityengagement   Goal Expiration Date 01/29/24   OT Frequency 3-5x/wk   Discharge Recommendation   Rehab Resource Intensity Level, OT I (Maximum Resource Intensity)   AM-PAC Daily Activity Inpatient   Lower Body Dressing 2   Bathing 2   Toileting 2   Upper Body Dressing 2   Grooming 3   Eating 3   Daily Activity Raw  Score 14   Daily Activity Standardized Score (Calc for Raw Score >=11) 33.39   AM-PAC Applied Cognition Inpatient   Following a Speech/Presentation 2   Understanding Ordinary Conversation 3   Taking Medications 3   Remembering Where Things Are Placed or Put Away 2   Remembering List of 4-5 Errands 2   Taking Care of Complicated Tasks 1   Applied Cognition Raw Score 13   Applied Cognition Standardized Score 30.46

## 2024-01-15 NOTE — PROCEDURES
"Lumbar puncture    Date/Time: 1/15/2024 3:07 PM    Performed by: Laura Rodriguez DO  Authorized by: Laura Rodriguez DO  Universal Protocol:  Consent: Written consent obtained.  Risks and benefits: risks, benefits and alternatives were discussed  Consent given by: spouse  Time out: Immediately prior to procedure a \"time out\" was called to verify the correct patient, procedure, equipment, support staff and site/side marked as required.  Timeout called at: 1/15/2024 3:00 PM.  Patient understanding: patient states understanding of the procedure being performed  Patient consent: the patient's understanding of the procedure matches consent given  Patient identity confirmed: arm band    Patient location:  Bedside  Indications:     Indications: evaluation for infection, evaluation for altered mental status and evaluation of opening pressure    Anesthesia (see MAR for exact dosages):     Anesthesia method:  Local infiltration    Local anesthetic:  Lidocaine 1% w/o epi  Procedure details:     Lumbar space:  L3-L4 interspace    Patient position:  L lateral decubitus    Equipment: Lumbar puncture kit used      Needle gauge:  20G x 3.5in    Needle type:  Spinal needle - Quincke tip    Ultrasound guidance: yes      Number of attempts:  2    Opening pressure (cm H2O):  15    Fluid appearance:  Blood-tinged then clearing    Tubes of fluid:  4    Total volume (ml):  12  Post-procedure:     Puncture site:  Adhesive bandage applied and direct pressure applied    Patient tolerance of procedure:  Tolerated well, no immediate complications          "

## 2024-01-16 PROBLEM — G40.909 SEIZURE DISORDER (HCC): Status: ACTIVE | Noted: 2024-01-16

## 2024-01-16 PROBLEM — R13.10 DYSPHAGIA: Status: ACTIVE | Noted: 2024-01-16

## 2024-01-16 LAB
ANION GAP SERPL CALCULATED.3IONS-SCNC: 4 MMOL/L
ANION GAP SERPL CALCULATED.3IONS-SCNC: 9 MMOL/L
BACTERIA CSF CULT: NO GROWTH
BACTERIA UR QL AUTO: ABNORMAL /HPF
BILIRUB UR QL STRIP: NEGATIVE
BUN SERPL-MCNC: 15 MG/DL (ref 5–25)
BUN SERPL-MCNC: 16 MG/DL (ref 5–25)
CALCIUM SERPL-MCNC: 8.8 MG/DL (ref 8.4–10.2)
CALCIUM SERPL-MCNC: 9.3 MG/DL (ref 8.4–10.2)
CHLORIDE SERPL-SCNC: 114 MMOL/L (ref 96–108)
CHLORIDE SERPL-SCNC: 116 MMOL/L (ref 96–108)
CLARITY UR: CLEAR
CO2 SERPL-SCNC: 24 MMOL/L (ref 21–32)
CO2 SERPL-SCNC: 26 MMOL/L (ref 21–32)
COLOR UR: ABNORMAL
CREAT SERPL-MCNC: 0.93 MG/DL (ref 0.6–1.3)
CREAT SERPL-MCNC: 0.99 MG/DL (ref 0.6–1.3)
ERYTHROCYTE [DISTWIDTH] IN BLOOD BY AUTOMATED COUNT: 13.9 % (ref 11.6–15.1)
GFR SERPL CREATININE-BSD FRML MDRD: 63 ML/MIN/1.73SQ M
GFR SERPL CREATININE-BSD FRML MDRD: 68 ML/MIN/1.73SQ M
GLUCOSE SERPL-MCNC: 103 MG/DL (ref 65–140)
GLUCOSE SERPL-MCNC: 126 MG/DL (ref 65–140)
GLUCOSE SERPL-MCNC: 145 MG/DL (ref 65–140)
GLUCOSE SERPL-MCNC: 172 MG/DL (ref 65–140)
GLUCOSE SERPL-MCNC: 234 MG/DL (ref 65–140)
GLUCOSE SERPL-MCNC: 235 MG/DL (ref 65–140)
GLUCOSE UR STRIP-MCNC: NEGATIVE MG/DL
HCT VFR BLD AUTO: 32.2 % (ref 34.8–46.1)
HGB BLD-MCNC: 10.6 G/DL (ref 11.5–15.4)
HGB UR QL STRIP.AUTO: NEGATIVE
KETONES UR STRIP-MCNC: NEGATIVE MG/DL
LEUKOCYTE ESTERASE UR QL STRIP: NEGATIVE
MAGNESIUM SERPL-MCNC: 2.4 MG/DL (ref 1.9–2.7)
MCH RBC QN AUTO: 28.5 PG (ref 26.8–34.3)
MCHC RBC AUTO-ENTMCNC: 32.9 G/DL (ref 31.4–37.4)
MCV RBC AUTO: 87 FL (ref 82–98)
NITRITE UR QL STRIP: NEGATIVE
NON-SQ EPI CELLS URNS QL MICRO: ABNORMAL /HPF
OSMOLALITY UR/SERPL-RTO: 314 MMOL/KG (ref 282–298)
OSMOLALITY UR: 388 MMOL/KG
PH UR STRIP.AUTO: 7 [PH]
PHOSPHATE SERPL-MCNC: 2.4 MG/DL (ref 2.7–4.5)
PLATELET # BLD AUTO: 319 THOUSANDS/UL (ref 149–390)
PMV BLD AUTO: 10.7 FL (ref 8.9–12.7)
POTASSIUM SERPL-SCNC: 3.3 MMOL/L (ref 3.5–5.3)
POTASSIUM SERPL-SCNC: 3.5 MMOL/L (ref 3.5–5.3)
PROCALCITONIN SERPL-MCNC: 0.14 NG/ML
PROT UR STRIP-MCNC: ABNORMAL MG/DL
RBC # BLD AUTO: 3.72 MILLION/UL (ref 3.81–5.12)
RBC #/AREA URNS AUTO: ABNORMAL /HPF
SODIUM 24H UR-SCNC: 105 MOL/L
SODIUM SERPL-SCNC: 144 MMOL/L (ref 135–147)
SODIUM SERPL-SCNC: 149 MMOL/L (ref 135–147)
SP GR UR STRIP.AUTO: 1.01 (ref 1–1.03)
UROBILINOGEN UR STRIP-ACNC: <2 MG/DL
WBC # BLD AUTO: 10.19 THOUSAND/UL (ref 4.31–10.16)
WBC #/AREA URNS AUTO: ABNORMAL /HPF

## 2024-01-16 PROCEDURE — 85027 COMPLETE CBC AUTOMATED: CPT | Performed by: NURSE PRACTITIONER

## 2024-01-16 PROCEDURE — 94760 N-INVAS EAR/PLS OXIMETRY 1: CPT

## 2024-01-16 PROCEDURE — 82948 REAGENT STRIP/BLOOD GLUCOSE: CPT

## 2024-01-16 PROCEDURE — 84100 ASSAY OF PHOSPHORUS: CPT | Performed by: NURSE PRACTITIONER

## 2024-01-16 PROCEDURE — 84300 ASSAY OF URINE SODIUM: CPT

## 2024-01-16 PROCEDURE — 83935 ASSAY OF URINE OSMOLALITY: CPT

## 2024-01-16 PROCEDURE — 84145 PROCALCITONIN (PCT): CPT | Performed by: NURSE PRACTITIONER

## 2024-01-16 PROCEDURE — 80048 BASIC METABOLIC PNL TOTAL CA: CPT

## 2024-01-16 PROCEDURE — 99232 SBSQ HOSP IP/OBS MODERATE 35: CPT | Performed by: INTERNAL MEDICINE

## 2024-01-16 PROCEDURE — 80048 BASIC METABOLIC PNL TOTAL CA: CPT | Performed by: PSYCHIATRY & NEUROLOGY

## 2024-01-16 PROCEDURE — 83930 ASSAY OF BLOOD OSMOLALITY: CPT

## 2024-01-16 PROCEDURE — 99291 CRITICAL CARE FIRST HOUR: CPT | Performed by: PSYCHIATRY & NEUROLOGY

## 2024-01-16 PROCEDURE — NC001 PR NO CHARGE: Performed by: PSYCHIATRY & NEUROLOGY

## 2024-01-16 PROCEDURE — 81001 URINALYSIS AUTO W/SCOPE: CPT | Performed by: PSYCHIATRY & NEUROLOGY

## 2024-01-16 PROCEDURE — 83735 ASSAY OF MAGNESIUM: CPT | Performed by: PSYCHIATRY & NEUROLOGY

## 2024-01-16 RX ORDER — INSULIN LISPRO 100 [IU]/ML
2-12 INJECTION, SOLUTION INTRAVENOUS; SUBCUTANEOUS
Status: DISCONTINUED | OUTPATIENT
Start: 2024-01-16 | End: 2024-02-02

## 2024-01-16 RX ORDER — INSULIN LISPRO 100 [IU]/ML
2-12 INJECTION, SOLUTION INTRAVENOUS; SUBCUTANEOUS
Status: DISCONTINUED | OUTPATIENT
Start: 2024-01-17 | End: 2024-01-16

## 2024-01-16 RX ORDER — LANOLIN ALCOHOL/MO/W.PET/CERES
6 CREAM (GRAM) TOPICAL
Status: DISCONTINUED | OUTPATIENT
Start: 2024-01-16 | End: 2024-02-14

## 2024-01-16 RX ORDER — SODIUM CHLORIDE, SODIUM GLUCONATE, SODIUM ACETATE, POTASSIUM CHLORIDE, MAGNESIUM CHLORIDE, SODIUM PHOSPHATE, DIBASIC, AND POTASSIUM PHOSPHATE .53; .5; .37; .037; .03; .012; .00082 G/100ML; G/100ML; G/100ML; G/100ML; G/100ML; G/100ML; G/100ML
1000 INJECTION, SOLUTION INTRAVENOUS ONCE
Status: COMPLETED | OUTPATIENT
Start: 2024-01-16 | End: 2024-01-16

## 2024-01-16 RX ORDER — POTASSIUM CHLORIDE 14.9 MG/ML
20 INJECTION INTRAVENOUS ONCE
Status: COMPLETED | OUTPATIENT
Start: 2024-01-16 | End: 2024-01-16

## 2024-01-16 RX ADMIN — VENLAFAXINE 25 MG: 25 TABLET ORAL at 11:15

## 2024-01-16 RX ADMIN — CHLORHEXIDINE GLUCONATE 15 ML: 1.2 SOLUTION ORAL at 20:42

## 2024-01-16 RX ADMIN — LAMOTRIGINE 150 MG: 100 TABLET ORAL at 20:44

## 2024-01-16 RX ADMIN — DEXAMETHASONE SODIUM PHOSPHATE 7.7 MG: 10 INJECTION, SOLUTION INTRAMUSCULAR; INTRAVENOUS at 15:09

## 2024-01-16 RX ADMIN — TOPIRAMATE 100 MG: 100 TABLET, FILM COATED ORAL at 08:30

## 2024-01-16 RX ADMIN — DEXAMETHASONE SODIUM PHOSPHATE 7.7 MG: 10 INJECTION, SOLUTION INTRAMUSCULAR; INTRAVENOUS at 04:23

## 2024-01-16 RX ADMIN — VENLAFAXINE 25 MG: 25 TABLET ORAL at 18:00

## 2024-01-16 RX ADMIN — VENLAFAXINE 25 MG: 25 TABLET ORAL at 08:30

## 2024-01-16 RX ADMIN — INSULIN LISPRO 4 UNITS: 100 INJECTION, SOLUTION INTRAVENOUS; SUBCUTANEOUS at 21:02

## 2024-01-16 RX ADMIN — INSULIN LISPRO 2 UNITS: 100 INJECTION, SOLUTION INTRAVENOUS; SUBCUTANEOUS at 18:00

## 2024-01-16 RX ADMIN — POTASSIUM PHOSPHATE, MONOBASIC POTASSIUM PHOSPHATE, DIBASIC 12 MMOL: 224; 236 INJECTION, SOLUTION, CONCENTRATE INTRAVENOUS at 11:15

## 2024-01-16 RX ADMIN — REMDESIVIR 100 MG: 100 INJECTION, POWDER, LYOPHILIZED, FOR SOLUTION INTRAVENOUS at 18:00

## 2024-01-16 RX ADMIN — Medication 6 MG: at 00:00

## 2024-01-16 RX ADMIN — TOPIRAMATE 100 MG: 100 TABLET, FILM COATED ORAL at 18:00

## 2024-01-16 RX ADMIN — ACYCLOVIR SODIUM 650 MG: 50 INJECTION, SOLUTION INTRAVENOUS at 11:15

## 2024-01-16 RX ADMIN — ACYCLOVIR SODIUM 650 MG: 50 INJECTION, SOLUTION INTRAVENOUS at 04:23

## 2024-01-16 RX ADMIN — CHLORHEXIDINE GLUCONATE 15 ML: 1.2 SOLUTION ORAL at 08:30

## 2024-01-16 RX ADMIN — INSULIN LISPRO 4 UNITS: 100 INJECTION, SOLUTION INTRAVENOUS; SUBCUTANEOUS at 11:15

## 2024-01-16 RX ADMIN — BUSPIRONE HYDROCHLORIDE 5 MG: 5 TABLET ORAL at 08:30

## 2024-01-16 RX ADMIN — LAMOTRIGINE 150 MG: 100 TABLET ORAL at 08:30

## 2024-01-16 RX ADMIN — POTASSIUM CHLORIDE 20 MEQ: 14.9 INJECTION, SOLUTION INTRAVENOUS at 09:18

## 2024-01-16 RX ADMIN — ENOXAPARIN SODIUM 40 MG: 40 INJECTION SUBCUTANEOUS at 08:30

## 2024-01-16 RX ADMIN — SODIUM CHLORIDE, SODIUM GLUCONATE, SODIUM ACETATE, POTASSIUM CHLORIDE, MAGNESIUM CHLORIDE, SODIUM PHOSPHATE, DIBASIC, AND POTASSIUM PHOSPHATE 1000 ML: .53; .5; .37; .037; .03; .012; .00082 INJECTION, SOLUTION INTRAVENOUS at 15:14

## 2024-01-16 RX ADMIN — QUETIAPINE 25 MG: 25 TABLET ORAL at 21:02

## 2024-01-16 RX ADMIN — MELATONIN 6 MG: at 20:42

## 2024-01-16 RX ADMIN — QUETIAPINE 25 MG: 25 TABLET ORAL at 00:00

## 2024-01-16 NOTE — ASSESSMENT & PLAN NOTE
Patient transferred from ICU to stepdown to  Patient had lumbar puncture done culture negative  Negative HSV PCR  Follow-up flow cytometry, cytology, paraneoplastic panel  Melatonin and Seroquel started

## 2024-01-16 NOTE — PROGRESS NOTES
[facility]  Progress Note: Critical Care  Name: Carla Hunt 58 y.o. female I MRN: [id number]  Unit/Bed#: ICU 13 I Date of Admission: 1/10/2024   Date of Service: 1/16/2024 I Hospital Day: 6    Assessment/Plan   Neuro:   Diagnosis: Acute Encephalopathy / delirium  ? Covid vs subclinical seizures (stare) vs CNS  MRI-Previously visualized diffusion signal abnormality in the right basal ganglia is no longer identified and may have been artifactual given underlying susceptibility artifact in the bilateral globus pallidus.   LP-cx/gm stain neg, ME panel neg, WBC 1  Repeat LP for additional labs-Protein 50/Gluc 113 /Gm Stain and cx-, HSV Neg, flow cytometry P  Plan:   Precedex gtt and Zyprexa d/c'd  Melatonin/Seroquel started ()  Acyclovir per ID 2-3 weeks (HSV neg on repeat LP)  F/u flow cytometry    Diagnosis: Hx Seizure [facility] s[facility]p [facility]  Plan:            vEEG no seizures d/c 1/13  Home lamictal/Topamax  Diagnosis: Depression/anxiety  Plan:   Home buspar/effexor   Holding home lexapro    CV:   No active issues     Pulm:  Diagnosis: Acute hypoxic resp failure 2/2 multifocal pna/covid  CXR-mild improvement in L infiltrates  BC 1/9 NG, leg/strep/MRSA neg  PCT 0.6-0.3-trend  Plan:   Wean HFNC 40/40 for sats>92% trial MFNC  Ceftx completed 1/15    Diagnosis: Covid 19  Actemra 1/9  -09-57-trend  Plan:   Severe Pathway  Steroids increased 1/13 to 0.1mg/kg D4/10  Remdesivir D8/10  Dornase  Enoxaparin BID  GI:   Diagnosis: Dysphagia  Plan: Speech following,      :   No active issues     F/E/N:   Diagnosis: Hypernatremia/Hypokalemia/hypophos/hypomag  Plan:   F/u am bmp  Encourage oral intake    Heme/Onc:   Diagnosis: Hx B Cell lymphoma w bone mets on chemo/anemia  Plan: outpt f/u  maintenance rituxan hycela until 5/2024 plan     Endo:   No active issues     ID:   See pulm. Bandemia 8--6 F/u am diff. If spikes temp recx  Urinary freq overnight t/c UA.  Diana d/c 1/12 0715     MSK/Skin:   No active issues    Disposition: Stepdown Level 1    ICU Core Measures     A: Assess, Prevent, and Manage Pain Has pain been assessed? Yes  Need for changes to pain regimen? No   B: Both SAT/SAT  N/A   C: Choice of Sedation RASS Goal: 0 Alert and Calm  Need for changes to sedation or analgesia regimen? No   D: Delirium CAM-ICU: Positive   E: Early Mobility  Plan for early mobility? Yes   F: Family Engagement Plan for family engagement today? Yes       Antibiotic Review: acyclovir per ID    Review of Invasive Devices:            Prophylaxis:  VTE VTE covered by:  enoxaparin, Subcutaneous       Stress Ulcer  not ordered        Significant 24hr Events     24hr events: Precedex gtt d/c. LP done. Seroquel/melatonin given for sleep. HFNC weaned to 40/40 failed mfnc. Urinary frequency overnight     Subjective     Review of Systems   Unable to perform ROS: Mental status change (limited w ms)   Respiratory: Negative.     Cardiovascular: Negative.    Gastrointestinal: Negative.    Genitourinary: Negative.    Neurological: Negative.         Objective                            Vitals I/O      Most Recent Min/Max in 24hrs   Temp 99 °F (37.2 °C) Temp  Min: 98.3 °F (36.8 °C)  Max: 99 °F (37.2 °C)   Pulse 104 Pulse  Min: 48  Max: 110   Resp 17 Resp  Min: 17  Max: 26   /74 BP  Min: 68/49  Max: 170/90   O2 Sat 94 % SpO2  Min: 80 %  Max: 100 %      Intake/Output Summary (Last 24 hours) at 1/16/2024 0056  Last data filed at 1/15/2024 1836  Gross per 24 hour   Intake 3534.52 ml   Output 3300 ml   Net 234.52 ml       Diet Dysphagia/Modified Consistency; Dysphagia 3-Dental Soft; Thin Liquid    Invasive Monitoring           Physical Exam   Physical Exam  Eyes:      Pupils: Pupils are equal, round, and reactive to light.   Skin:     General: Skin is warm and dry.   HENT:      Head: Normocephalic and atraumatic.      Mouth/Throat:      Mouth: Mucous membranes are moist.   Cardiovascular:      Rate  and Rhythm: Normal rate and regular rhythm.      Pulses: Normal pulses.      Heart sounds: Normal heart sounds.   Musculoskeletal:         General: No swelling. Normal range of motion.      Right lower leg: No edema.      Left lower leg: No edema.   Abdominal: General: Bowel sounds are normal. There is no distension.      Palpations: Abdomen is soft.      Tenderness: There is no abdominal tenderness.   Constitutional:       General: She is not in acute distress.  Pulmonary:      Effort: Pulmonary effort is normal. No respiratory distress.      Breath sounds: Normal breath sounds.   Neurological:      General: No focal deficit present.      Mental Status: She is alert. She is disoriented to time and disoriented to situation.      GCS: GCS eye subscore is 4. GCS verbal subscore is 5. GCS motor subscore is 6.      Motor: Strength full and intact in all extremities. No motor deficit.            Diagnostic Studies      EKG: tele  Imaging:  I have personally reviewed pertinent reports.   and I have personally reviewed pertinent films in PACS     Medications:  Scheduled PRN   acyclovir, 10 mg/kg (Ideal), Q8H  busPIRone, 5 mg, BID  chlorhexidine, 15 mL, Q12H SARTHAK  dexamethasone, 0.1 mg/kg, Q12H  enoxaparin, 40 mg, Daily  insulin lispro, 1-6 Units, HS  insulin lispro, 2-12 Units, Q6H SARTHAK  lamoTRIgine, 150 mg, BID  melatonin, 6 mg, HS  polyethylene glycol, 17 g, Daily  QUEtiapine, 25 mg, HS  remdesivir, 100 mg, Q24H  senna-docusate sodium, 2 tablet, BID  topiramate, 100 mg, BID  venlafaxine, 25 mg, TID With Meals      acetaminophen, 650 mg, Q6H PRN  bisacodyl, 10 mg, Daily PRN  ondansetron, 4 mg, Q6H PRN       Continuous          Labs:    CBC    Recent Labs     01/14/24  0521 01/15/24  0502   WBC 9.04 9.57   HGB 10.8* 11.1*   HCT 33.8* 33.2*    286   BANDSPCT  --  6     BMP    Recent Labs     01/14/24  0521 01/15/24  0502   SODIUM 147 146   K 3.2* 3.4*   * 116*   CO2 21 21   AGAP 7 9   BUN 15 15   CREATININE 0.99  0.90   CALCIUM 7.8* 8.0*       Coags    No recent results     Additional Electrolytes  Recent Labs     01/14/24  0521 01/15/24  0502   MG 2.0 1.9   PHOS 1.9* 2.0*          Blood Gas    No recent results  No recent results LFTs  Recent Labs     01/14/24  0521   ALT 85*   AST 81*   ALKPHOS 102   ALB 2.9*   TBILI 0.55       Infectious  Recent Labs     01/14/24  0521   PROCALCITONI 0.32*     Glucose  Recent Labs     01/14/24  0521 01/15/24  0502   GLUC 112 136               Non-Critical Care Time Statement: I have spent a total time of 20 minutes in caring for this patient including Diagnostic results, Documenting in the medical record, and Reviewing / ordering tests, medicine, procedures  .     DANYELLE Carrillo

## 2024-01-16 NOTE — PROGRESS NOTES
Progress Note - Infectious Disease   Carla Hunt 58 y.o. female MRN: [id number]  Unit/Bed#: ICU 13 Encounter: [id number]      Impression/Recommendations:  SIRS.  If patient does indeed had seizure, SIRS may be all secondary to it.  Consideration for CNS infection, as in below.  Consideration for COVID as bacterial pneumonia contributing to this.  Patient is overall clinically proved and she remains systemically nontoxic.  Temperature is down.  WBC normalized.  Admission blood cultures have no growth.  Antibiotic and antiviral plan as in below.    Monitor temperature/WBC.     Encephalitis versus encephalopathy.  Head CT and MRI did not show acute CVA or any mass lesion.  There is no seizure on EEG monitoring now but it is unclear whether patient may have a seizure during recent hospitalization at [facility] prior to transfer.  Clinical picture was never suggestive of bacterial meningitis but HSV encephalitis was possible.  Initial lumbar puncture on 1/12 was low volume due to patient's agitation.  CSF was benign but there was not enough CSF for HSV PCR.  Patient had lumbar puncture again on 1/15.  CSF again is benign, with negative HSV PCR.  HSV encephalitis would be unlikely with 2 benign CSF and repeatedly negative HSV PCR.  At this point, patient does not need any additional antiviral.  Discontinue IV acyclovir.  Monitor mental status.     Severe COVID, with acute hypoxic respiratory failure.  Patient is currently on remdesivir and dexamethasone and did receive 1 dose of Tocilizumab.  CXR with relatively mild opacities.  With elevated procalcitonin, there is concern of possible concurrent bacterial pneumonia.  Patient did receive initial rounds of COVID vaccination but not the new COVID vaccination available this past fall.  Procalcitonin decreasing.  Patient completed remdesivir course.  Patient completed antibiotic course for possible bacterial pneumonia.  Continue dexamethasone.  Observe off  further antibiotic.  Monitor respiratory symptoms and O2 saturation.     Acute hypoxic respiratory failure, likely multifactorial, with COVID contributing.  Respiratory status improving, with O2 support weaning.  O2 support per critical service.     CKD.  Creatinine improving.  Antibiotic and antiviral dosages adjusted accordingly.  Monitor creatinine.     Seizure disorder, status post temporal lobe resection in .  There is no active seizure on EEG monitoring at present but it is unclear whether patient may have had seizure earlier.  Neurology follow-up.     B-cell lymphoma, on chemotherapy.  Patient is leukopenic but not neutropenic.  Monitor WBC/ANC.     Discussed with patient in detail regarding the above plan.  Discussed with Dr. Llamas from critical care service regarding plan for discontinuation of acyclovir above.  She is agreeable.     Antibiotics:  Acyclovir # 6     Subjective:  Patient remains in ICU.  She is much more awake and alert today, with much less confusion.  She remains on O2 supplement, but decreased to mid flow.  Temperature stays down.  No chills.  She is tolerating antibiotic well.  No nausea, vomiting or diarrhea.     Objective:  Vitals:  Temp:  [98.4 °F (36.9 °C)-99.2 °F (37.3 °C)] 99.2 °F (37.3 °C)  HR:  [] 104  Resp:  [17-28] 23  BP: ()/(47-94) 103/79  SpO2:  [69 %-100 %] 96 %  Temp (24hrs), Av °F (37.2 °C), Min:98.4 °F (36.9 °C), Max:99.2 °F (37.3 °C)  Current: Temperature: 99.2 °F (37.3 °C)    Physical Exam:     General: Awake, alert, cooperative, no distress.   Neck:  Supple. No mass.  No lymphadenopathy.   Lungs: Expansion symmetric, stable basilar rhonchi, no rales, no wheezing, respirations still labored.   Heart:  Regular rate and rhythm, S1 and S2 normal, no murmur.   Abdomen: Soft, nondistended, non-tender, bowel sounds active all four quadrants, no masses, no organomegaly.   Extremities: Stable trace leg edema. No erythema/warmth. No ulcer. Nontender to  palpation.   Skin:  No rash.   Neuro: Moves all extremities.     Invasive Devices       Peripheral Intravenous Line  Duration             Peripheral IV 01/13/24 Proximal;Right;Upper;Ventral (anterior) Arm 3 days    Peripheral IV 01/15/24 Left;Upper Arm 1 day                    Labs studies:   I have personally reviewed pertinent labs.  Results from last 7 days   Lab Units 01/16/24  0445 01/15/24  0502 01/14/24  0521 01/12/24  0558 01/11/24  0454 01/10/24  1030 01/10/24  0441   POTASSIUM mmol/L 3.3* 3.4* 3.2*   < > 3.9   < > 4.0   CHLORIDE mmol/L 116* 116* 119*   < > 120*   < > 121*   CO2 mmol/L 24 21 21   < > 20*   < > 18*   BUN mg/dL 15 15 15   < > 26*   < > 21   CREATININE mg/dL 0.93 0.90 0.99   < > 1.23   < > 1.20   EGFR ml/min/1.73sq m 68 71 63   < > 48   < > 50   CALCIUM mg/dL 8.8 8.0* 7.8*   < > 9.0   < > 8.8   AST U/L  --   --  81*  --  97*  --  125*   ALT U/L  --   --  85*  --  77*  --  87*   ALK PHOS U/L  --   --  102  --  92  --  97    < > = values in this interval not displayed.     Results from last 7 days   Lab Units 01/16/24  0445 01/15/24  0502 01/14/24  0521   WBC Thousand/uL 10.19* 9.57 9.04   HEMOGLOBIN g/dL 10.6* 11.1* 10.8*   PLATELETS Thousands/uL 319 286 271     Results from last 7 days   Lab Units 01/15/24  1544 01/12/24  0408 01/10/24  2115 01/09/24  2039 01/09/24  2035   BLOOD CULTURE   --   --   --  No Growth After 5 Days. No Growth After 5 Days.   GRAM STAIN RESULT  Rare Mononuclear Cells  No organisms seen No No Polys or Bacteria seen  --   --   --    MRSA CULTURE ONLY   --   --   --  No Methicillin Resistant Staphlyococcus aureus (MRSA) isolated  --    LEGIONELLA URINARY ANTIGEN   --   --  Negative  --   --        Imaging Studies:   I have personally reviewed pertinent imaging study reports and images in PACS.    EKG, Pathology, and Other Studies:   I have personally reviewed pertinent reports.

## 2024-01-16 NOTE — PROGRESS NOTES
Critical Care Interval Transfer Note:    Please refer to progress note from earlier today for full details.       Seizure disorder (HCC)  Assessment & Plan  S/p Temporal lobectomy in 2007  Home lamictal and topamax     Dysphagia  Assessment & Plan  Speech following    Teto marginal zone B-cell lymphoma (HCC)  Assessment & Plan  Outpatient f/u  maintenance rituxan hycela until 5/2024 plan     Acute respiratory failure with hypoxia (HCC)  Assessment & Plan  Midflow 15L  Wean as tolerated  Procalcitonin 0.6-0.3-0.15  Ceftriaxone finished 1/15    Stage 3b chronic kidney disease (CKD) (HCC)  Assessment & Plan  Lab Results   Component Value Date    EGFR 63 01/16/2024    EGFR 68 01/16/2024    EGFR 71 01/15/2024    CREATININE 0.99 01/16/2024    CREATININE 0.93 01/16/2024    CREATININE 0.90 01/15/2024     Monitor BMP    COVID  Assessment & Plan  Midflow 15L  Wean as tolerated  Actermra 1/9  CRP trend  0.1 mgkg D 4/10  Remdesivir D8/10  Empxaparin BID    Anxiety state  Assessment & Plan  Home effexor     * Encephalopathy acute  Assessment & Plan  Melatonin/Seroquel qhs  Frequent reorientation          Barriers to discharge:   Midflow nasal canula  Confusion     Consults: IP CONSULT TO CASE MANAGEMENT  IP CONSULT TO PHARMACY  IP CONSULT TO INFECTIOUS DISEASES  IP CONSULT TO PICC TEAM    Recommended to review admission imaging for incidental findings and document in discharge navigator: Chart reviewed, no known incidental findings noted at this time.      Discharge Plan: Discharge pending weaning midflow            Patient seen and evaluated by Critical Care today and deemed to be appropriate for transfer to Stepdown Level 2. Spoke to Dr. Lugo from [facility] to accept transfer. Critical care can be contacted via [facility] with any questions or concerns.

## 2024-01-16 NOTE — PROGRESS NOTES
" Pastoral Care Progress Note    2024  Patient: Carla Hunt : 1965  Admission Date & Time: 1/10/2024 1941  MRN: [id number] CSN: [id number]      Covid positive pt sitting in recliner finished breakfast and presented as quiet, friendly, and slightly confused at times. She reported that she feels much better and is thankful for the care she is receiving from staff.  offered emotional and spiritual support through advocating for a phone in the room, companionship, facilitating a life story about her medical issues and the loss of a son. She shared, \"I'm lonely. I told my kids not to visit because of the weather... My  took my phone. He was afraid it would be stolen. (He said I don't need it. \"You'll be on your phone instead of healing.\"\"But I want to be able to talk to my daughter.\" Chaplains remain available.             Chaplaincy Interventions Utilized:   Empowerment: Encouraged assertiveness, Encouraged focus on present, Encouraged self-care, Provided anxiety containment, and Provided chaplaincy education    Exploration: Explored hope, Explored emotional needs & resources, Explored relational needs & resources, Explored spiritual needs & resources, and Facilitated life review    Collaboration: Advocated for patient/family    Relationship Building: Cultivated a relationship of care and support and Listened empathically    Chaplaincy Outcomes Achieved:  Debriefed/defused experience, Expressed gratitude, and Expressed humor    Spiritual Coping Strategies Utilized:   Connectedness, Spiritual practices, and Spiritual comfort       24 1000   Clinical Encounter Type   Visited With Patient   Routine Visit Introduction   Mormon Encounters   Mormon Needs   (prayer declined)         "

## 2024-01-16 NOTE — NURSING NOTE
Patient focused on getting OOB. Patient urinated all over the bed. Patient OOB to chair and less agitated and said she would go to sleep.  Patient given food/snacks because she thought she did not eat all day. I told he maybe she forgot.   Accomodated the patient to her needs with the possibility she would feel more comfortable so she would sleep.

## 2024-01-16 NOTE — ASSESSMENT & PLAN NOTE
Patient weaned down to mid flow 12 L  Wean as tolerated  Procalcitonin 0.6-0.3-0.15  Ceftriaxone finished 1/15  Patient diagnosed with severe COVID please see management below

## 2024-01-16 NOTE — NURSING NOTE
Vascular consult received for Midline.  Pt currently with 2 PIV's.  Per Primary RN, pt is getting transferred out of ICU to med surg floor, PIV just placed; therefore OK to hold off on midline for now.  Since pt being transferred, will complete consult at this time.

## 2024-01-16 NOTE — PLAN OF CARE
Problem: Nutrition/Hydration-ADULT  Goal: Nutrient/Hydration intake appropriate for improving, restoring or maintaining nutritional needs  Description: Monitor and assess patient's nutrition/hydration status for malnutrition. Collaborate with interdisciplinary team and initiate plan and interventions as ordered.  Monitor patient's weight and dietary intake as ordered or per policy. Utilize nutrition screening tool and intervene as necessary. Determine patient's food preferences and provide high-protein, high-caloric foods as appropriate.     INTERVENTIONS:  - Monitor oral intake, urinary output, labs, and treatment plans  - Assess nutrition and hydration status and recommend course of action  - Evaluate amount of meals eaten  - Assist patient with eating if necessary   - Allow adequate time for meals  - Recommend/ encourage appropriate diets, oral nutritional supplements, and vitamin/mineral supplements  - Order, calculate, and assess calorie counts as needed  - Recommend, monitor, and adjust tube feedings and TPN/PPN based on assessed needs  - Assess need for intravenous fluids  - Provide specific nutrition/hydration education as appropriate  - Include patient/family/caregiver in decisions related to nutrition  Outcome: Progressing     Problem: INFECTION - ADULT  Goal: Absence or prevention of progression during hospitalization  Description: INTERVENTIONS:  - Assess and monitor for signs and symptoms of infection  - Monitor lab/diagnostic results  - Monitor all insertion sites, i.e. indwelling lines, tubes, and drains  - Monitor endotracheal if appropriate and nasal secretions for changes in amount and color  - [place] appropriate cooling/warming therapies per order  - Administer medications as ordered  - Instruct and encourage patient and family to use good hand hygiene technique  - Identify and instruct in appropriate isolation precautions for identified infection/condition  Outcome: Progressing     Problem:  SAFETY ADULT  Goal: Patient will remain free of falls  Description: INTERVENTIONS:  - Educate patient/family on patient safety including physical limitations  - Instruct patient to call for assistance with activity   - Consult OT/PT to assist with strengthening/mobility   - Keep Call bell within reach  - Keep bed low and locked with side rails adjusted as appropriate  - Keep care items and personal belongings within reach  - Initiate and maintain comfort rounds  - Make Fall Risk Sign visible to staff  - Offer Toileting every 2 Hours, in advance of need  - Initiate/Maintain bed/chair alarm  - Obtain necessary fall risk management equipment:   - Apply yellow socks and bracelet for high fall risk patients  - Consider moving patient to room near nurses station  Outcome: Progressing

## 2024-01-16 NOTE — NURSING NOTE
Patient is GCS 14. Confused but able to state Name, , age, place answers [place] but when asked if in hospital, she states yes.   Able to state month and but states year as . Able to view clock and state the correct time and correct with day/night perception    Constantly fidgeting and pulling the high flow off and will desat to as low as 44%.Patient has been using the incentive to 2500cc.  Patient frequently urinating almost hourly.  Patient snapped her IV tubing from the pump to the IV. Pulls BP cuff, oxygen sensor, hi flow and venodyne pump tubing off.   Patient was on a 1:1 observation last documented on 24 at 15:00. Does well with family/company and redirection. Does not fair well when alone in room at night. If admitting discussed with charge if I did get another admit, patient would become a 1:1. Patient is not safe at night.   As per staff, patient has not slept in days.   Patient given 6 mg melatonin and 25 mg seroquel for sleep.

## 2024-01-16 NOTE — UTILIZATION REVIEW
Continued Stay Review    Date: 01/16/24                          Current Patient Class: IP  Current Level of Care: Critical Care    HPI:58 y.o. female initially admitted on 1/10.     Assessment/Plan: Precedex gtt discontinued. Lumbar puncture performed, results pending. Received Seroquel and melatonin for sleep. HFNC weaned to 40/40, but failed trial of mid-flow. Urinary frequency overnight. On exam, disoriented to time and situation, strength intact. Plan: continue melatonin/seroquel, acyclovir, continue home meds except lexapro, continue HFNC wean to mid-flow as tolerated for sats > 92%. Continue IV steroids, IV remdesivir, Lovenox, encourage PO intake, Trend labs, replete electrolytes as needed. BG checks ACHS, telemetry, continuous pulse ox, DW, I&O.     Vital Signs:   Date/Time Temp Pulse Resp BP MAP (mmHg) SpO2 FiO2 (%) Calculated FIO2 (%) - Nasal Cannula O2 Flow Rate (L/min) Nasal Cannula O2 Flow Rate (L/min) O2 Device   01/16/24 1000 -- 114 Abnormal  20 98/56 69 96 % -- -- -- -- --   01/16/24 0900 -- 122 Abnormal  -- 131/73 88 97 % -- -- -- -- --   01/16/24 0852 -- -- -- -- -- 97 % -- -- 15 L/min -- Mid flow nasal cannula   01/16/24 0824 -- -- -- -- -- -- -- -- -- -- --   01/16/24 0800 99.2 °F (37.3 °C) 106 Abnormal  22 128/67 86 96 % 60 220 -- 50 L/min High flow nasal cannula   01/16/24 0600 -- 78 20 146/86 103 77 % Abnormal  -- -- -- -- --   01/16/24 0500 -- 84 23 Abnormal  120/61 72 82 % Abnormal  -- -- -- -- --   01/16/24 0335 -- 70 20 -- -- 94 % -- -- -- -- --   01/16/24 0214 -- -- -- -- -- 96 % -- -- -- -- --   01/16/24 0205 -- 80 -- -- -- 96 % -- -- -- -- --   01/16/24 0100 -- 108 Abnormal  -- 128/73 89 69 % Abnormal  -- -- -- -- --   01/16/24 0000 99.2 °F (37.3 °C) -- -- -- -- -- -- -- -- -- --   01/15/24 2245 -- 118 Abnormal  26 Abnormal  148/89 98 97 % -- -- -- -- --   01/15/24 2130 -- 102 28 Abnormal  145/72 103 93 % -- -- -- -- --   01/15/24 2117 -- -- -- -- -- 94 % -- -- -- -- --   01/15/24 2115  -- 98 19 -- -- 94 % -- -- -- -- --   01/15/24 2100 -- 102 25 Abnormal  129/69 93 90 % -- -- -- -- --   01/15/24 2030 -- 76 23 Abnormal  138/71 100 95 % -- -- -- -- --   01/15/24 2000 99 °F (37.2 °C) 104 17 152/74 95 94 % -- -- -- -- --   01/15/24 1930 -- 74 26 Abnormal  153/79 102 92 % -- -- -- -- --   01/15/24 1900 -- 106 Abnormal  26 Abnormal  146/94 110 95 % -- -- -- -- --   01/15/24 1800 -- 106 Abnormal  26 Abnormal  150/83 107 90 % -- -- -- -- --   01/15/24 1730 -- 106 Abnormal  23 Abnormal  129/91 101 92 % -- -- -- -- --   01/15/24 1700 -- 110 Abnormal  24 Abnormal  125/78 93 85 % Abnormal  -- -- -- -- --   01/15/24 1600 98.4 °F (36.9 °C) 76 21 112/73 85 92 % 60 -- 50 L/min -- High flow nasal cannula   01/15/24 1530 -- 102 23 Abnormal  97/62 86 100 % -- -- -- -- --   01/15/24 1500 -- 86 22 102/62 76 99 % -- -- -- -- --       Redd Coma Scale  Date and Time Eye Opening Best Verbal Response Best Motor Response Redd Coma Scale Score   01/16/24 0800 4 4 6 14   01/16/24 0354 4 4 6 14   01/16/24 0005 4 4 6 14   01/15/24 1945 4 4 6 14   01/15/24 1600 4 4 6 14       Pertinent Labs/Diagnostic Results:   1/15 - CXR  Worsening diffuse bilateral airspace opacities.       Results from last 7 days   Lab Units 01/16/24  0445 01/15/24  0502 01/14/24  0521 01/13/24  0506 01/12/24  0558 01/11/24  0454 01/10/24  0441 01/09/24  1926   WBC Thousand/uL 10.19* 9.57 9.04 7.92 3.48* 2.91* 1.99* 2.26*   HEMOGLOBIN g/dL 10.6* 11.1* 10.8* 11.6 11.4* 12.2 11.9 11.4*   HEMATOCRIT % 32.2* 33.2* 33.8* 34.7* 34.2* 37.5 36.0 35.0   PLATELETS Thousands/uL 319 286 271 302 293 332 293 264   NEUTROS ABS Thousands/µL  --   --   --   --  3.06  --  1.71* 1.98   BANDS PCT %  --  6  --  8  --  7  --   --          Results from last 7 days   Lab Units 01/16/24  0445 01/15/24  0502 01/14/24  0521 01/13/24  0506 01/12/24  0558   SODIUM mmol/L 149* 146 147 148* 150*   POTASSIUM mmol/L 3.3* 3.4* 3.2* 3.0* 3.3*   CHLORIDE mmol/L 116* 116* 119* 114* 118*    CO2 mmol/L 24 21 21 23 21   ANION GAP mmol/L 9 9 7 11 11   BUN mg/dL 15 15 15 18 24   CREATININE mg/dL 0.93 0.90 0.99 0.96 1.12   EGFR ml/min/1.73sq m 68 71 63 65 54   CALCIUM mg/dL 8.8 8.0* 7.8* 7.5* 7.8*   MAGNESIUM mg/dL 2.4 1.9 2.0 2.0 1.7*   PHOSPHORUS mg/dL 2.4* 2.0* 1.9* 2.5* 2.5*     Results from last 7 days   Lab Units 01/14/24  0521 01/11/24  0454 01/10/24  1030 01/10/24  0441 01/09/24  1926   AST U/L 81* 97*  --  125* 135*   ALT U/L 85* 77*  --  87* 89*   ALK PHOS U/L 102 92  --  97 99   TOTAL PROTEIN g/dL 4.6* 5.8*  --  5.8* 5.7*   ALBUMIN g/dL 2.9* 3.4*  --  3.3* 3.3*   TOTAL BILIRUBIN mg/dL 0.55 0.53  --  0.59 0.70   AMMONIA umol/L  --   --  46  --   --      Results from last 7 days   Lab Units 01/16/24  0005 01/15/24  1648 01/15/24  1220 01/15/24  0855 01/14/24  2149 01/14/24  1747 01/14/24  1252 01/14/24  1137 01/14/24  0812 01/13/24  2140 01/13/24  1804 01/13/24  1225   POC GLUCOSE mg/dl 145* 238* 269* 180* 176* 135 214* 212* 150* 175* 108 216*     Results from last 7 days   Lab Units 01/16/24  0445 01/15/24  0502 01/14/24  0521 01/13/24  0506 01/12/24  0558 01/11/24  0454 01/11/24  0023 01/10/24  1809 01/10/24  1219 01/10/24  1030 01/10/24  0441 01/09/24  1926   GLUCOSE RANDOM mg/dL 126 136 112 120 154* 185* 168* 155* 136 116 138 107     Results from last 7 days   Lab Units 01/11/24  0512   PH ART  7.350   PCO2 ART mm Hg 32.6*   PO2 ART mm Hg 76.7   HCO3 ART mmol/L 17.6*   BASE EXC ART mmol/L -7.0   O2 CONTENT ART mL/dL 17.1   O2 HGB, ARTERIAL % 94.0     Results from last 7 days   Lab Units 01/09/24 1926   CK TOTAL U/L 314*         Results from last 7 days   Lab Units 01/09/24 1926   D-DIMER QUANTITATIVE ug/ml FEU 1.98*     Results from last 7 days   Lab Units 01/12/24  0558 01/11/24  1519   PROTIME seconds  --  16.3*   INR   --  1.32*   PTT seconds 32  --      Results from last 7 days   Lab Units 01/12/24  0558   TSH 3RD GENERATON uIU/mL 4.570*     Results from last 7 days   Lab Units  01/16/24  0445 01/14/24  0521 01/11/24  0611 01/10/24  0441 01/09/24 1926   PROCALCITONIN ng/ml 0.14 0.32* 0.37* 0.60* 0.66*     Results from last 7 days   Lab Units 01/11/24  0454 01/09/24 1926   LACTIC ACID mmol/L 1.3 0.9     Results from last 7 days   Lab Units 01/09/24 2036   HEP B S AG  Non-reactive   HEP C AB  Non-reactive   HEP B C IGM  Non-reactive   HEP B C TOTAL AB  Non-reactive         Results from last 7 days   Lab Units 01/14/24  0521 01/11/24  0454 01/10/24  0441 01/09/24  1926   CRP mg/L 21.9* 76.6* 110.4* 88.6*     Results from last 7 days   Lab Units 01/10/24  2115   STREP PNEUMONIAE ANTIGEN, URINE  Negative   LEGIONELLA URINARY ANTIGEN  Negative     Results from last 7 days   Lab Units 01/15/24  1544 01/12/24  0408 01/09/24 2039 01/09/24 2035   BLOOD CULTURE   --   --  No Growth After 5 Days. No Growth After 5 Days.   GRAM STAIN RESULT  Rare Mononuclear Cells  No organisms seen No No Polys or Bacteria seen  --   --      Results from last 7 days   Lab Units 01/15/24  1544   TOTAL COUNTED  8     Results from last 7 days   Lab Units 01/15/24  1544 01/12/24 0408   APPEARANCE CSF  clear, colorless light pink   TUBE NUM CSF  4 2   WBC CSF /uL 1 1   XANTHOCHROMIA  No No   MONOCYTES % (CSF) % 88  --    GLUCOSE CSF mg/dL 113*  --    PROTEIN CSF mg/dL 50*  --    RBC CSF uL 25*  --    CSF CULTURE   --  No growth     Results from last 7 days   Lab Units 01/11/24 0454   VANCOMYCIN RM ug/mL 28.7*       Medications:   Scheduled Medications:  acyclovir, 10 mg/kg (Ideal), Intravenous, Q8H  busPIRone, 5 mg, Oral, BID  chlorhexidine, 15 mL, Mouth/Throat, Q12H SARTHAK  dexamethasone, 0.1 mg/kg, Intravenous, Q12H  enoxaparin, 40 mg, Subcutaneous, Daily  insulin lispro, 1-6 Units, Subcutaneous, HS  insulin lispro, 2-12 Units, Subcutaneous, Q6H SARTHAK  lamoTRIgine, 150 mg, Oral, BID  melatonin, 6 mg, Oral, HS  polyethylene glycol, 17 g, Oral, Daily  potassium chloride, 20 mEq, Intravenous, Once  potassium phosphate, 12  mmol, Intravenous, Once  QUEtiapine, 25 mg, Oral, HS  remdesivir, 100 mg, Intravenous, Q24H  senna-docusate sodium, 2 tablet, Oral, BID  topiramate, 100 mg, Oral, BID  venlafaxine, 25 mg, Oral, TID With Meals    Continuous IV Infusions: none    PRN Meds:  acetaminophen, 650 mg, Oral, Q6H PRN  bisacodyl, 10 mg, Rectal, Daily PRN  ondansetron, 4 mg, Intravenous, Q6H PRN        Discharge Plan: D    Network Utilization Review Department  ATTENTION: Please call with any questions or concerns to [phone] and carefully listen to the prompts so that you are directed to the right person. All voicemails are confidential.   For Discharge needs, contact Care Management DC Support Team at [phone] opt. [phone]  Send all requests for admission clinical reviews, approved or denied determinations and any other requests to dedicated fax number below belonging to the campus where the patient is receiving treatment. List of dedicated fax numbers for the Facilities:  FACILITY NAME UR FAX NUMBER   ADMISSION DENIALS (Administrative/Medical Necessity) [phone]   DISCHARGE SUPPORT TEAM (NETWORK) [phone]   PARENT CHILD HEALTH (Maternity/NICU/Pediatrics) [phone]   [facility] [phone]   [facility] [phone]   [facility] [phone]   [facility] [phone]   [facility] [phone]   [facility] [phone]   [facility] [phone]   [facility] [phone]   [facility] [phone]   [facility] [phone]   [facility] [phone]

## 2024-01-17 ENCOUNTER — APPOINTMENT (INPATIENT)
Dept: RADIOLOGY | Facility: HOSPITAL | Age: 59
DRG: 003 | End: 2024-01-17
Payer: COMMERCIAL

## 2024-01-17 LAB
ANION GAP SERPL CALCULATED.3IONS-SCNC: 8 MMOL/L
BUN SERPL-MCNC: 18 MG/DL (ref 5–25)
CALCIUM SERPL-MCNC: 8.7 MG/DL (ref 8.4–10.2)
CHLORIDE SERPL-SCNC: 112 MMOL/L (ref 96–108)
CO2 SERPL-SCNC: 25 MMOL/L (ref 21–32)
CREAT SERPL-MCNC: 1.04 MG/DL (ref 0.6–1.3)
ERYTHROCYTE [DISTWIDTH] IN BLOOD BY AUTOMATED COUNT: 13.9 % (ref 11.6–15.1)
GFR SERPL CREATININE-BSD FRML MDRD: 59 ML/MIN/1.73SQ M
GLUCOSE SERPL-MCNC: 150 MG/DL (ref 65–140)
GLUCOSE SERPL-MCNC: 151 MG/DL (ref 65–140)
GLUCOSE SERPL-MCNC: 194 MG/DL (ref 65–140)
GLUCOSE SERPL-MCNC: 244 MG/DL (ref 65–140)
GLUCOSE SERPL-MCNC: 99 MG/DL (ref 65–140)
HCT VFR BLD AUTO: 33.9 % (ref 34.8–46.1)
HGB BLD-MCNC: 11.1 G/DL (ref 11.5–15.4)
MAGNESIUM SERPL-MCNC: 2.3 MG/DL (ref 1.9–2.7)
MCH RBC QN AUTO: 29.3 PG (ref 26.8–34.3)
MCHC RBC AUTO-ENTMCNC: 32.7 G/DL (ref 31.4–37.4)
MCV RBC AUTO: 89 FL (ref 82–98)
PHOSPHATE SERPL-MCNC: 2.6 MG/DL (ref 2.7–4.5)
PLATELET # BLD AUTO: 284 THOUSANDS/UL (ref 149–390)
PMV BLD AUTO: 10.8 FL (ref 8.9–12.7)
POTASSIUM SERPL-SCNC: 3.8 MMOL/L (ref 3.5–5.3)
RBC # BLD AUTO: 3.79 MILLION/UL (ref 3.81–5.12)
SODIUM SERPL-SCNC: 145 MMOL/L (ref 135–147)
WBC # BLD AUTO: 9.88 THOUSAND/UL (ref 4.31–10.16)

## 2024-01-17 PROCEDURE — 82948 REAGENT STRIP/BLOOD GLUCOSE: CPT

## 2024-01-17 PROCEDURE — 94760 N-INVAS EAR/PLS OXIMETRY 1: CPT

## 2024-01-17 PROCEDURE — 97535 SELF CARE MNGMENT TRAINING: CPT

## 2024-01-17 PROCEDURE — 85027 COMPLETE CBC AUTOMATED: CPT | Performed by: REGISTERED NURSE

## 2024-01-17 PROCEDURE — 84100 ASSAY OF PHOSPHORUS: CPT

## 2024-01-17 PROCEDURE — 92526 ORAL FUNCTION THERAPY: CPT

## 2024-01-17 PROCEDURE — 80048 BASIC METABOLIC PNL TOTAL CA: CPT | Performed by: REGISTERED NURSE

## 2024-01-17 PROCEDURE — 97530 THERAPEUTIC ACTIVITIES: CPT

## 2024-01-17 PROCEDURE — 88108 CYTOPATH CONCENTRATE TECH: CPT | Performed by: PATHOLOGY

## 2024-01-17 PROCEDURE — 99232 SBSQ HOSP IP/OBS MODERATE 35: CPT | Performed by: INTERNAL MEDICINE

## 2024-01-17 PROCEDURE — 97116 GAIT TRAINING THERAPY: CPT

## 2024-01-17 PROCEDURE — NC001 PR NO CHARGE: Performed by: PSYCHIATRY & NEUROLOGY

## 2024-01-17 PROCEDURE — 83735 ASSAY OF MAGNESIUM: CPT | Performed by: REGISTERED NURSE

## 2024-01-17 PROCEDURE — 71045 X-RAY EXAM CHEST 1 VIEW: CPT

## 2024-01-17 PROCEDURE — 97110 THERAPEUTIC EXERCISES: CPT

## 2024-01-17 RX ORDER — SODIUM CHLORIDE, SODIUM GLUCONATE, SODIUM ACETATE, POTASSIUM CHLORIDE, MAGNESIUM CHLORIDE, SODIUM PHOSPHATE, DIBASIC, AND POTASSIUM PHOSPHATE .53; .5; .37; .037; .03; .012; .00082 G/100ML; G/100ML; G/100ML; G/100ML; G/100ML; G/100ML; G/100ML
500 INJECTION, SOLUTION INTRAVENOUS ONCE
Status: COMPLETED | OUTPATIENT
Start: 2024-01-17 | End: 2024-01-17

## 2024-01-17 RX ADMIN — CHLORHEXIDINE GLUCONATE 15 ML: 1.2 SOLUTION ORAL at 09:16

## 2024-01-17 RX ADMIN — REMDESIVIR 100 MG: 100 INJECTION, POWDER, LYOPHILIZED, FOR SOLUTION INTRAVENOUS at 22:37

## 2024-01-17 RX ADMIN — DEXAMETHASONE SODIUM PHOSPHATE 7.7 MG: 10 INJECTION, SOLUTION INTRAMUSCULAR; INTRAVENOUS at 15:55

## 2024-01-17 RX ADMIN — SENNOSIDES, DOCUSATE SODIUM 2 TABLET: 8.6; 5 TABLET ORAL at 08:59

## 2024-01-17 RX ADMIN — ENOXAPARIN SODIUM 40 MG: 40 INJECTION SUBCUTANEOUS at 08:53

## 2024-01-17 RX ADMIN — SODIUM CHLORIDE, SODIUM GLUCONATE, SODIUM ACETATE, POTASSIUM CHLORIDE, MAGNESIUM CHLORIDE, SODIUM PHOSPHATE, DIBASIC, AND POTASSIUM PHOSPHATE 500 ML: .53; .5; .37; .037; .03; .012; .00082 INJECTION, SOLUTION INTRAVENOUS at 14:37

## 2024-01-17 RX ADMIN — VENLAFAXINE 25 MG: 25 TABLET ORAL at 13:07

## 2024-01-17 RX ADMIN — VENLAFAXINE 25 MG: 25 TABLET ORAL at 16:49

## 2024-01-17 RX ADMIN — VENLAFAXINE 25 MG: 25 TABLET ORAL at 08:55

## 2024-01-17 RX ADMIN — POLYETHYLENE GLYCOL 3350 17 G: 17 POWDER, FOR SOLUTION ORAL at 08:59

## 2024-01-17 RX ADMIN — INSULIN LISPRO 2 UNITS: 100 INJECTION, SOLUTION INTRAVENOUS; SUBCUTANEOUS at 16:49

## 2024-01-17 RX ADMIN — INSULIN LISPRO 4 UNITS: 100 INJECTION, SOLUTION INTRAVENOUS; SUBCUTANEOUS at 21:59

## 2024-01-17 RX ADMIN — INSULIN LISPRO 2 UNITS: 100 INJECTION, SOLUTION INTRAVENOUS; SUBCUTANEOUS at 13:03

## 2024-01-17 RX ADMIN — CHLORHEXIDINE GLUCONATE 15 ML: 1.2 SOLUTION ORAL at 21:58

## 2024-01-17 RX ADMIN — DEXAMETHASONE SODIUM PHOSPHATE 7.7 MG: 10 INJECTION, SOLUTION INTRAMUSCULAR; INTRAVENOUS at 03:30

## 2024-01-17 RX ADMIN — TOPIRAMATE 100 MG: 100 TABLET, FILM COATED ORAL at 08:55

## 2024-01-17 RX ADMIN — SENNOSIDES, DOCUSATE SODIUM 2 TABLET: 8.6; 5 TABLET ORAL at 16:50

## 2024-01-17 RX ADMIN — LAMOTRIGINE 150 MG: 100 TABLET ORAL at 08:56

## 2024-01-17 RX ADMIN — TOPIRAMATE 100 MG: 100 TABLET, FILM COATED ORAL at 16:50

## 2024-01-17 RX ADMIN — INSULIN LISPRO 2 UNITS: 100 INJECTION, SOLUTION INTRAVENOUS; SUBCUTANEOUS at 08:48

## 2024-01-17 RX ADMIN — MELATONIN 6 MG: at 21:57

## 2024-01-17 RX ADMIN — LAMOTRIGINE 150 MG: 100 TABLET ORAL at 21:58

## 2024-01-17 RX ADMIN — POTASSIUM PHOSPHATE, MONOBASIC POTASSIUM PHOSPHATE, DIBASIC 12 MMOL: 224; 236 INJECTION, SOLUTION, CONCENTRATE INTRAVENOUS at 11:23

## 2024-01-17 RX ADMIN — QUETIAPINE 25 MG: 25 TABLET ORAL at 21:58

## 2024-01-17 NOTE — PLAN OF CARE
Problem: Prexisting or High Potential for Compromised Skin Integrity  Goal: Skin integrity is maintained or improved  Description: INTERVENTIONS:  - Identify patients at risk for skin breakdown  - Assess and monitor skin integrity  - Assess and monitor nutrition and hydration status  - Monitor labs   - Assess for incontinence   - Turn and reposition patient  - Assist with mobility/ambulation  - Relieve pressure over bony prominences  - Avoid friction and shearing  - Provide appropriate hygiene as needed including keeping skin clean and dry  - Evaluate need for skin moisturizer/barrier cream  - Collaborate with interdisciplinary team   - Patient/family teaching  - Consider wound care consult   Outcome: Progressing     Problem: Nutrition/Hydration-ADULT  Goal: Nutrient/Hydration intake appropriate for improving, restoring or maintaining nutritional needs  Description: Monitor and assess patient's nutrition/hydration status for malnutrition. Collaborate with interdisciplinary team and initiate plan and interventions as ordered.  Monitor patient's weight and dietary intake as ordered or per policy. Utilize nutrition screening tool and intervene as necessary. Determine patient's food preferences and provide high-protein, high-caloric foods as appropriate.     INTERVENTIONS:  - Monitor oral intake, urinary output, labs, and treatment plans  - Assess nutrition and hydration status and recommend course of action  - Evaluate amount of meals eaten  - Assist patient with eating if necessary   - Allow adequate time for meals  - Recommend/ encourage appropriate diets, oral nutritional supplements, and vitamin/mineral supplements  - Order, calculate, and assess calorie counts as needed  - Recommend, monitor, and adjust tube feedings and TPN/PPN based on assessed needs  - Assess need for intravenous fluids  - Provide specific nutrition/hydration education as appropriate  - Include patient/family/caregiver in decisions related to  nutrition  Outcome: Progressing     Problem: PAIN - ADULT  Goal: Verbalizes/displays adequate comfort level or baseline comfort level  Description: Interventions:  - Encourage patient to monitor pain and request assistance  - Assess pain using appropriate pain scale  - Administer analgesics based on type and severity of pain and evaluate response  - Implement non-pharmacological measures as appropriate and evaluate response  - Consider cultural and social influences on pain and pain management  - Notify physician/advanced practitioner if interventions unsuccessful or patient reports new pain  Outcome: Progressing     Problem: INFECTION - ADULT  Goal: Absence or prevention of progression during hospitalization  Description: INTERVENTIONS:  - Assess and monitor for signs and symptoms of infection  - Monitor lab/diagnostic results  - Monitor all insertion sites, i.e. indwelling lines, tubes, and drains  - Monitor endotracheal if appropriate and nasal secretions for changes in amount and color  - [place] appropriate cooling/warming therapies per order  - Administer medications as ordered  - Instruct and encourage patient and family to use good hand hygiene technique  - Identify and instruct in appropriate isolation precautions for identified infection/condition  Outcome: Progressing  Goal: Absence of fever/infection during neutropenic period  Description: INTERVENTIONS:  - Monitor WBC    Outcome: Progressing     Problem: SAFETY ADULT  Goal: Patient will remain free of falls  Description: INTERVENTIONS:  - Educate patient/family on patient safety including physical limitations  - Instruct patient to call for assistance with activity   - Consult OT/PT to assist with strengthening/mobility   - Keep Call bell within reach  - Keep bed low and locked with side rails adjusted as appropriate  - Keep care items and personal belongings within reach  - Initiate and maintain comfort rounds  - Make Fall Risk Sign visible to  staff  - Apply yellow socks and bracelet for high fall risk patients  - Consider moving patient to room near nurses station  Outcome: Progressing  Goal: Maintain or return to baseline ADL function  Description: INTERVENTIONS:  -  Assess patient's ability to carry out ADLs; assess patient's baseline for ADL function and identify physical deficits which impact ability to perform ADLs (bathing, care of mouth/teeth, toileting, grooming, dressing, etc.)  - Assess/evaluate cause of self-care deficits   - Assess range of motion  - Assess patient's mobility; develop plan if impaired  - Assess patient's need for assistive devices and provide as appropriate  - Encourage maximum independence but intervene and supervise when necessary  - Involve family in performance of ADLs  - Assess for home care needs following discharge   - Consider OT consult to assist with ADL evaluation and planning for discharge  - Provide patient education as appropriate  Outcome: Progressing  Goal: Maintains/Returns to pre admission functional level  Description: INTERVENTIONS:  - Perform AM-PAC 6 Click Basic Mobility/ Daily Activity assessment daily.  - Set and communicate daily mobility goal to care team and patient/family/caregiver.   - Collaborate with rehabilitation services on mobility goals if consulted  - Out of bed for toileting  - Record patient progress and toleration of activity level   Outcome: Progressing     Problem: DISCHARGE PLANNING  Goal: Discharge to home or other facility with appropriate resources  Description: INTERVENTIONS:  - Identify barriers to discharge w/patient and caregiver  - Arrange for needed discharge resources and transportation as appropriate  - Identify discharge learning needs (meds, wound care, etc.)  - Arrange for interpretive services to assist at discharge as needed  - Refer to Case Management Department for coordinating discharge planning if the patient needs post-hospital services based on  physician/advanced practitioner order or complex needs related to functional status, cognitive ability, or social support system  Outcome: Progressing     Problem: Knowledge Deficit  Goal: Patient/family/caregiver demonstrates understanding of disease process, treatment plan, medications, and discharge instructions  Description: Complete learning assessment and assess knowledge base.  Interventions:  - Provide teaching at level of understanding  - Provide teaching via preferred learning methods  Outcome: Progressing     Problem: NEUROSENSORY - ADULT  Goal: Achieves stable or improved neurological status  Description: INTERVENTIONS  - Monitor and report changes in neurological status  - Monitor vital signs such as temperature, blood pressure, glucose, and any other labs ordered   - Initiate measures to prevent increased intracranial pressure  - Monitor for seizure activity and implement precautions if appropriate      Outcome: Progressing  Goal: Remains free of injury related to seizures activity  Description: INTERVENTIONS  - Maintain airway, patient safety  and administer oxygen as ordered  - Monitor patient for seizure activity, document and report duration and description of seizure to physician/advanced practitioner  - If seizure occurs,  ensure patient safety during seizure  - Reorient patient post seizure  - Seizure pads on all 4 side rails  - Instruct patient/family to notify RN of any seizure activity including if an aura is experienced  - Instruct patient/family to call for assistance with activity based on nursing assessment  - Administer anti-seizure medications if ordered    Outcome: Progressing  Goal: Achieves maximal functionality and self care  Description: INTERVENTIONS  - Monitor swallowing and airway patency with patient fatigue and changes in neurological status  - Encourage and assist patient to increase activity and self care.   - Encourage visually impaired, hearing impaired and aphasic  patients to use assistive/communication devices  Outcome: Progressing     Problem: CARDIOVASCULAR - ADULT  Goal: Maintains optimal cardiac output and hemodynamic stability  Description: INTERVENTIONS:  - Monitor I/O, vital signs and rhythm  - Monitor for S/S and trends of decreased cardiac output  - Administer and titrate ordered vasoactive medications to optimize hemodynamic stability  - Assess quality of pulses, skin color and temperature  - Assess for signs of decreased coronary artery perfusion  - Instruct patient to report change in severity of symptoms  Outcome: Progressing  Goal: Absence of cardiac dysrhythmias or at baseline rhythm  Description: INTERVENTIONS:  - Continuous cardiac monitoring, vital signs, obtain 12 lead EKG if ordered  - Administer antiarrhythmic and heart rate control medications as ordered  - Monitor electrolytes and administer replacement therapy as ordered  Outcome: Progressing     Problem: RESPIRATORY - ADULT  Goal: Achieves optimal ventilation and oxygenation  Description: INTERVENTIONS:  - Assess for changes in respiratory status  - Assess for changes in mentation and behavior  - Position to facilitate oxygenation and minimize respiratory effort  - Oxygen administered by appropriate delivery if ordered  - Initiate smoking cessation education as indicated  - Encourage broncho-pulmonary hygiene including cough, deep breathe, Incentive Spirometry  - Assess the need for suctioning and aspirate as needed  - Assess and instruct to report SOB or any respiratory difficulty  - Respiratory Therapy support as indicated  Outcome: Progressing     Problem: GASTROINTESTINAL - ADULT  Goal: Maintains or returns to baseline bowel function  Description: INTERVENTIONS:  - Assess bowel function  - Encourage oral fluids to ensure adequate hydration  - Administer IV fluids if ordered to ensure adequate hydration  - Administer ordered medications as needed  - Encourage mobilization and activity  - Consider  nutritional services referral to assist patient with adequate nutrition and appropriate food choices  Outcome: Progressing  Goal: Maintains adequate nutritional intake  Description: INTERVENTIONS:  - Monitor percentage of each meal consumed  - Identify factors contributing to decreased intake, treat as appropriate  - Assist with meals as needed  - Monitor I&O, weight, and lab values if indicated  - Obtain nutrition services referral as needed  Outcome: Progressing     Problem: GENITOURINARY - ADULT  Goal: Maintains or returns to baseline urinary function  Description: INTERVENTIONS:  - Assess urinary function  - Encourage oral fluids to ensure adequate hydration if ordered  - Administer IV fluids as ordered to ensure adequate hydration  - Administer ordered medications as needed  - Offer frequent toileting  - Follow urinary retention protocol if ordered  Outcome: Progressing  Goal: Absence of urinary retention  Description: INTERVENTIONS:  - Assess patient’s ability to void and empty bladder  - Monitor I/O  - Bladder scan as needed  - Discuss with physician/AP medications to alleviate retention as needed  - Discuss catheterization for long term situations as appropriate  Outcome: Progressing     Problem: METABOLIC, FLUID AND ELECTROLYTES - ADULT  Goal: Electrolytes maintained within normal limits  Description: INTERVENTIONS:  - Monitor labs and assess patient for signs and symptoms of electrolyte imbalances  - Administer electrolyte replacement as ordered  - Monitor response to electrolyte replacements, including repeat lab results as appropriate  - Instruct patient on fluid and nutrition as appropriate  Outcome: Progressing  Goal: Fluid balance maintained  Description: INTERVENTIONS:  - Monitor labs   - Monitor I/O and WT  - Instruct patient on fluid and nutrition as appropriate  - Assess for signs & symptoms of volume excess or deficit  Outcome: Progressing     Problem: SKIN/TISSUE INTEGRITY - ADULT  Goal: Skin  Integrity remains intact(Skin Breakdown Prevention)  Description: Assess:  -Inspect skin when repositioning, toileting, and assisting with ADLS  -Assess extremities for adequate circulation and sensation     Bed Management:  -Have minimal linens on bed & keep smooth, unwrinkled  -Change linens as needed when moist or perspiring  Toileting:  -Offer bedside commode    Activity:  -Encourage activity and walks on unit  -Encourage or provide ROM exercises     Skin Care:  -Avoid use of baby powder, tape, friction and shearing, hot water or constrictive clothing  Outcome: Progressing  Goal: Incision(s), wounds(s) or drain site(s) healing without S/S of infection  Description: INTERVENTIONS  - Assess and document dressing, incision, wound bed, drain sites and surrounding tissue  - Provide patient and family education  Outcome: Progressing     Problem: HEMATOLOGIC - ADULT  Goal: Maintains hematologic stability  Description: INTERVENTIONS  - Assess for signs and symptoms of bleeding or hemorrhage  - Monitor labs  - Administer supportive blood products/factors as ordered and appropriate  Outcome: Progressing     Problem: MUSCULOSKELETAL - ADULT  Goal: Maintain or return mobility to safest level of function  Description: INTERVENTIONS:  - Assess patient's ability to carry out ADLs; assess patient's baseline for ADL function and identify physical deficits which impact ability to perform ADLs (bathing, care of mouth/teeth, toileting, grooming, dressing, etc.)  - Assess/evaluate cause of self-care deficits   - Assess range of motion  - Assess patient's mobility  - Assess patient's need for assistive devices and provide as appropriate  - Encourage maximum independence but intervene and supervise when necessary  - Involve family in performance of ADLs  - Assess for home care needs following discharge   - Consider OT consult to assist with ADL evaluation and planning for discharge  - Provide patient education as appropriate  Outcome:  Progressing     Problem: COPING  Goal: Pt/Family able to verbalize concerns and demonstrate effective coping strategies  Description: INTERVENTIONS:  - Assist patient/family to identify coping skills, available support systems and cultural and spiritual values  - Provide emotional support, including active listening and acknowledgement of concerns of patient and caregivers  - Reduce environmental stimuli, as able  - Provide patient education  - Assess for spiritual pain/suffering and initiate spiritual care, including notification of Pastoral Care or [place] based community as needed  - Assess effectiveness of coping strategies  Outcome: Progressing  Goal: Will report anxiety at manageable levels  Description: INTERVENTIONS:  - Administer medication as ordered  - Teach and encourage coping skills  - Provide emotional support  - Assess patient/family for anxiety and ability to cope  Outcome: Progressing     Problem: Potential for Falls  Goal: Patient will remain free of falls  Description: INTERVENTIONS:  - Educate patient/family on patient safety including physical limitations  - Instruct patient to call for assistance with activity   - Consult OT/PT to assist with strengthening/mobility   - Keep Call bell within reach  - Keep bed low and locked with side rails adjusted as appropriate  - Keep care items and personal belongings within reach  - Initiate and maintain comfort rounds  - Make Fall Risk Sign visible to staff  - Apply yellow socks and bracelet for high fall risk patients  - Consider moving patient to room near nurses station  Outcome: Progressing

## 2024-01-17 NOTE — PROGRESS NOTES
[facility]  Progress Note: Critical Care  Name: Carla Hunt 58 y.o. female I MRN: [id number]  Unit/Bed#: ICU 13 I Date of Admission: 1/10/2024   Date of Service: 1/17/2024 I Hospital Day: 7    Assessment/Plan   Neuro:   Diagnosis: Acute Encephalopathy / delirium  Unclear etiology. Ddx Covid vs subclinical seizures (stare) vs CNS  MRI-Previously visualized diffusion signal abnormality in the right basal ganglia is no longer identified and may have been artifactual given underlying susceptibility artifact in the bilateral globus pallidus.   LP-cx/gm stain neg, ME panel neg, WBC 1  Repeat LP for additional labs-Protein 50/Gluc 113 /Gm Stain and cx-, HSV Neg, flow cytometry P  Plan:   Precedex gtt and Zyprexa d/c'd  Melatonin/Seroquel started ()  Acyclovir d/c given negative HSV PCR  F/u flow cytometry, cytology, paraneoplastic panel      Diagnosis: Hx Seizure dx s/p Temporal lobectomy [id number]  Plan:            vEEG no seizures d/c 1/13  Home lamictal/Topamax  Diagnosis: Depression/anxiety  Plan:   Home Effexor  Switched from lexapro 12/16 by PCP     CV:   Diagnosis: tachycardia  Improved with IVF  Plan:   Will monitor and give fluids as needed     Pulm:  Diagnosis: Acute hypoxic resp failure 2/2 multifocal pna/covid  BC 1/9 NG, leg/strep/MRSA neg  PCT 0.6-0.3-0.15-trend  Plan:   CXR given hypoxia on Midflow and need for return to HFNC  Wean HFNC 50/x for sats>92% trial MFNC  Ceftx completed 1/15     Diagnosis: Covid 19  Actemra 1/9  -95-29-trend  Plan:   Severe Pathway  Steroids increased 1/13 to 0.1mg/kg D4/10  Remdesivir D9/10  Enoxaparin BID  Dornase d/c 1/15    GI:   Diagnosis: Dysphagia  Plan: Speech following,    :   Diagnosis: Urinary Frequency  Plan:   UA negative  Urine, serum osm and NA studies negative     F/E/N:   Diagnosis: Hypernatremia/Hypokalemia/hypophos/hypomag  Plan:   F/u am bmp  Encourage oral intake    Heme/Onc:   Diagnosis: Hx B Cell  lymphoma w bone mets on chemo/anemia  Plan: outpt f/u  maintenance rituxan hycela until 5/2024 plan    Endo:   No active issues    ID:   See pulm. Bandemia 8--6 f/u am diff  Urinary freq overnight UA negative     MSK/Skin:   No active issues    Disposition: Stepdown Level 2    ICU Core Measures     A: Assess, Prevent, and Manage Pain Has pain been assessed? Yes  Need for changes to pain regimen? No   B: Both SAT/SAT  N/A   C: Choice of Sedation RASS Goal: 0 Alert and Calm  Need for changes to sedation or analgesia regimen? No   D: Delirium CAM-ICU: Negative   E: Early Mobility  Plan for early mobility? Yes   F: Family Engagement Plan for family engagement today? Yes       Review of Invasive Devices:            Prophylaxis:  VTE VTE covered by:  enoxaparin, Subcutaneous, 40 mg at 01/17/24 0853       Stress Ulcer  not ordered        Significant 24hr Events     24hr events: Downgraded to step down 2 yesterday. Overnight desaturations to 82-85% on 8L-15L midflow placed back on HFNC. Patient reports some increased SOB overnight.          Subjective     Review of Systems   Respiratory:  Positive for shortness of breath.    All other systems reviewed and are negative.       Objective                            Vitals I/O      Most Recent Min/Max in 24hrs   Temp 98.5 °F (36.9 °C) Temp  Min: 98.5 °F (36.9 °C)  Max: 99.5 °F (37.5 °C)   Pulse (!) 106 Pulse  Min: 78  Max: 126   Resp (!) 26 Resp  Min: 18  Max: 26   /89 BP  Min: 91/47  Max: 176/89   O2 Sat 92 % SpO2  Min: 82 %  Max: 100 %      Intake/Output Summary (Last 24 hours) at 1/17/2024 0855  Last data filed at 1/17/2024 0852  Gross per 24 hour   Intake 900 ml   Output 4225 ml   Net -3325 ml       Diet Dysphagia/Modified Consistency; Dysphagia 3-Dental Soft; Thin Liquid    Invasive Monitoring           Physical Exam   Physical Exam  Eyes:      General: No dysconjugate gaze     Extraocular Movements: Extraocular movements intact.      Conjunctiva/sclera: Conjunctivae  normal.      Pupils: Pupils are equal, round, and reactive to light.   Skin:     General: Skin is warm and dry.   HENT:      Head: Normocephalic and atraumatic.      Mouth/Throat:      Mouth: Mucous membranes are moist.   Cardiovascular:      Rate and Rhythm: Normal rate and regular rhythm.   Abdominal:      Palpations: Abdomen is soft.      Tenderness: There is no abdominal tenderness.   Constitutional:       General: She is not in acute distress.  Pulmonary:      Comments: Breath sounds CTA b/l, no rales, wheezing, rhonchi   Neurological:      Mental Status: She is alert and oriented to person, place, and time.      GCS: GCS eye subscore is 4. GCS verbal subscore is 5. GCS motor subscore is 6.      Cranial Nerves: Cranial nerves 2-12 are intact.      Comments: 5/5 strength in all extremities             Diagnostic Studies      EKG: NSR at 96 bpm on monitor  Imaging:  I have personally reviewed pertinent films in PACS     Medications:  Scheduled PRN   chlorhexidine, 15 mL, Q12H SARTHAK  dexamethasone, 0.1 mg/kg, Q12H  enoxaparin, 40 mg, Daily  insulin lispro, 2-12 Units, TID With Meals  insulin lispro, 2-12 Units, HS  lamoTRIgine, 150 mg, BID  melatonin, 6 mg, HS  polyethylene glycol, 17 g, Daily  QUEtiapine, 25 mg, HS  remdesivir, 100 mg, Q24H  senna-docusate sodium, 2 tablet, BID  topiramate, 100 mg, BID  venlafaxine, 25 mg, TID With Meals      acetaminophen, 650 mg, Q6H PRN  bisacodyl, 10 mg, Daily PRN  ondansetron, 4 mg, Q6H PRN       Continuous          Labs:    CBC    Recent Labs     01/16/24  0445 01/17/24  0454   WBC 10.19* 9.88   HGB 10.6* 11.1*   HCT 32.2* 33.9*    284     BMP    Recent Labs     01/16/24  1412 01/17/24  0454   SODIUM 144 145   K 3.5 3.8   * 112*   CO2 26 25   AGAP 4 8   BUN 16 18   CREATININE 0.99 1.04   CALCIUM 9.3 8.7       Coags    No recent results     Additional Electrolytes  Recent Labs     01/16/24  0445 01/17/24  0454   MG 2.4 2.3   PHOS 2.4* 2.6*          Blood Gas    No  recent results  No recent results LFTs  No recent results    Infectious  Recent Labs     01/16/24  0445   PROCALCITONI 0.14     Glucose  Recent Labs     01/16/24  0445 01/16/24  1412 01/17/24  0454   GLUC 126 103 99                   Laura Rodriguez, DO

## 2024-01-17 NOTE — ASSESSMENT & PLAN NOTE
Supplemental oxygen as needed -severe pathway  Actermra 1/9  CRP trending down  0.1 mgkg through 1/22  Remdesivir through 1/21  Empxaparin BID

## 2024-01-17 NOTE — PLAN OF CARE
Problem: OCCUPATIONAL THERAPY ADULT  Goal: Performs self-care activities at highest level of function for planned discharge setting.  See evaluation for individualized goals.  Description: Treatment Interventions: ADL retraining, Functional transfer training, UE strengthening/ROM, Endurance training, Patient/family training, Equipment evaluation/education, Compensatory technique education, Continued evaluation, Energy conservation, Activityengagement          See flowsheet documentation for full assessment, interventions and recommendations.   Note: Limitation: Decreased ADL status, Decreased UE strength, Decreased Safe judgement during ADL, Decreased endurance, Decreased self-care trans, Decreased high-level ADLs  Prognosis: Good  Assessment: Pt seen bedside for OT session focusing on self care tasks, functional mob/transfers and overall tolerance to activity - completed bathing tasks seated on EOB, required min for LB tasks in stance 2* impaired dynamic balance and tolerance otherwise SBA with setup of supplies for tasks - required several rest breaks 2* desaturation with activity to mid to low 80's - cues required to utilize pursed lip breathing techniques t/o - transfers bed to chair with min a 2* need to remain on HFNC - Continue to recommend Level I resources post d/c - OT to continue to follow to address goals as stated on eval     Rehab Resource Intensity Level, OT: I (Maximum Resource Intensity)

## 2024-01-17 NOTE — PLAN OF CARE
Problem: PHYSICAL THERAPY ADULT  Goal: Performs mobility at highest level of function for planned discharge setting.  See evaluation for individualized goals.  Description:    Equipment Recommended:  (none)       See flowsheet documentation for full assessment, interventions and recommendations.  Outcome: Progressing  Note: Prognosis: Good  Problem List: Decreased strength, Decreased range of motion, Decreased endurance, Impaired balance, Decreased mobility, Impaired judgement, Decreased safety awareness  Assessment: pt seen for PT session as noted above. Pt presents in bed on HFNC; agreeable and cleared for mobilization by RN + CC team.  Pt required much less assist for supine> sit> EOB transition + was able to tolerate prolonged seated activities EOB  on HFNC- pt w/ desaturations to low 80's w/ standing activities and did required 2-4 min seated rest breaks w/ instruction/ education in breathing techniques for recovery.  Pt w/ excellent progress from last session. Pt OOB in chair post session w/ all needs in reach. Pt very pleased w/ progress thus far. PT will continue to follow nad progress.        Rehab Resource Intensity Level, PT: II (Moderate Resource Intensity)    See flowsheet documentation for full assessment.

## 2024-01-17 NOTE — OCCUPATIONAL THERAPY NOTE
Occupational Therapy Progress Note     Patient Name: Carla Hunt  Today's Date: 1/17/2024  Problem List  Principal Problem:    Encephalopathy acute  Active Problems:    Anxiety state    Localization-related symptomatic epilepsy and epileptic syndromes with complex partial seizures, intractable, without status epilepticus (HCC)    Stroke-like symptoms    COVID    Stage 3b chronic kidney disease (CKD) (HCC)    Acute respiratory failure with hypoxia (HCC)    Teto marginal zone B-cell lymphoma (HCC)    Dysphagia    Seizure disorder (HCC)        01/17/24 1125   OT Last Visit   OT Visit Date 01/17/24   Note Type   Note Type Treatment   Pain Assessment   Pain Assessment Tool 0-10   Pain Score No Pain   Restrictions/Precautions   Weight Bearing Precautions Per Order No   Other Precautions Chair Alarm;Bed Alarm;Fall Risk;Multiple lines   Lifestyle   Autonomy I adls and mobility - i iadls - shares homemaking with family   Reciprocal Relationships supportive family   Service to Others volunteers at [employer] organization   Intrinsic Gratification sedentary   ADL   Eating Assistance Unable to assess   Grooming Assistance 5  Supervision/Setup   Grooming Deficit Setup;Increased time to complete;Wash/dry hands;Wash/dry face;Teeth care;Brushing hair  (seated EOB)   UB Bathing Assistance 5  Supervision/Setup   LB Bathing Assistance 4  Minimal Assistance   UB Dressing Assistance 5  Supervision/Setup   LB Dressing Assistance 4  Minimal Assistance   Functional Standing Tolerance   Time 2 min for bathing of perianal area   Activity bathing of perianal area and CM   Bed Mobility   Supine to Sit 4  Minimal assistance   Transfers   Sit to Stand 4  Minimal assistance   Stand to Sit 4  Minimal assistance   Functional Mobility   Functional Mobility 4  Minimal assistance   Additional items Hand hold assistance   Subjective   Subjective offers no c/o   Cognition   Arousal/Participation Arousable;Cooperative   Attention Attends with cues to  redirect   Orientation Level Oriented X4   Memory Decreased recall of precautions   Following Commands Follows one step commands without difficulty   Activity Tolerance   Activity Tolerance Patient limited by fatigue;Treatment limited secondary to medical complications (Comment)  (desaturates to mid-low 80's with activity on Hi-flow NC - cues for pursed lip breathing techniques to recover to 91-93%)   Assessment   Assessment Pt seen bedside for OT session focusing on self care tasks, functional mob/transfers and overall tolerance to activity - completed bathing tasks seated on EOB, required min for LB tasks in stance 2* impaired dynamic balance and tolerance otherwise SBA with setup of supplies for tasks - required several rest breaks 2* desaturation with activity to mid to low 80's - cues required to utilize pursed lip breathing techniques t/o - transfers bed to chair with min a 2* need to remain on HFNC - Continue to recommend Level I resources post d/c - OT to continue to follow to address goals as stated on eval   Plan   Treatment Interventions ADL retraining;Functional transfer training;Endurance training;Patient/family training;Equipment evaluation/education;Compensatory technique education;Energy conservation;Activityengagement   Goal Expiration Date 01/29/24   OT Treatment Day 1   OT Frequency 3-5x/wk   Discharge Recommendation   Rehab Resource Intensity Level, OT I (Maximum Resource Intensity)   AM-PAC Daily Activity Inpatient   Lower Body Dressing 2   Bathing 2   Toileting 3   Upper Body Dressing 3   Grooming 4   Eating 4   Daily Activity Raw Score 18   Daily Activity Standardized Score (Calc for Raw Score >=11) 38.66   AM-PAC Applied Cognition Inpatient   Following a Speech/Presentation 3   Understanding Ordinary Conversation 4   Taking Medications 3   Remembering Where Things Are Placed or Put Away 3   Remembering List of 4-5 Errands 3   Taking Care of Complicated Tasks 3   Applied Cognition Raw Score 19    Applied Cognition Standardized Score 39.77   End of Consult   Education Provided Yes   Patient Position at End of Consult Bedside chair;Bed/Chair alarm activated;All needs within reach   Nurse Communication Nurse aware of consult         The patient's raw score on the AM-PAC Daily Activity Inpatient Short Form is 18. A raw score of less than 19 suggests the patient may benefit from discharge to post-acute rehabilitation services. Please refer to the recommendation of the Occupational Therapist for safe discharge planning.      Shoshana Garcia

## 2024-01-17 NOTE — UTILIZATION REVIEW
Continued Stay Review    Date: 1/17/24                          Current Patient Class: inpatient  Current Level of Care: level 2 stepdown/HOT  HPI:58 y.o. female initially admitted on 1/10/24     Assessment/Plan:   IV Steroids, IV Remdesivir continued, IV K repletion given. Lungs with coarse breath sounds, O2 @ 15ltr HFNC.    24hr events: Downgraded to step down 2 yesterday. Overnight increased SOB & desaturations to 82-85% on 8L-15L midflow placed back on HFNC.  Neurological:      Mental Status: She is alert and oriented to person, place, and time.      GCS: GCS eye subscore is 4. GCS verbal subscore is 5. GCS motor subscore is 6.      Cranial Nerves: Cranial nerves 2-12 are intact.      Comments: 5/5 strength in all extremities     Vital Signs:   Date/Time Temp Pulse Resp BP MAP (mmHg) SpO2 FiO2 (%) Calculated FIO2 (%) - Nasal Cannula O2 Flow Rate (L/min) Nasal Cannula O2 Flow Rate (L/min) O2 Device O2 Interface Device Patient Position - Orthostatic VS   01/17/24 1000 99.3 °F (37.4 °C) 118 Abnormal  24 Abnormal  118/77 95 95 % -- -- -- -- High flow nasal cannula -- --   01/17/24 0900 97.7 °F (36.5 °C) 100 21 149/82 96 96 % -- -- -- -- High flow nasal cannula -- --   01/17/24 0846 -- 106 Abnormal  26 Abnormal  160/89 122 92 % -- -- -- -- High flow nasal cannula -- --   01/17/24 0844 -- -- -- -- -- -- -- -- -- -- -- HFNC prongs --   01/17/24 0615 -- -- -- -- -- 95 % -- -- -- -- -- HFNC prongs --   01/17/24 0534 -- 92 24 Abnormal  -- -- 95 % -- -- -- -- High flow nasal cannula -- --   01/17/24 0512 -- -- -- -- -- 85 % Abnormal   -- 80 -- 15 L/min Mid flow nasal cannula -- --   SpO2: np aware and to bedside to assess, high flow initiated at 01/17/24 0512   01/17/24 0500 -- 98 25 Abnormal  147/78 97 82 % Abnormal  -- 60 -- 10 L/min Mid flow nasal cannula -- Lying   01/17/24 0400 -- -- -- -- -- 96 % -- -- -- -- -- -- --   01/17/24 0300 98.5 °F (36.9 °C) 92 20 134/69 95 98 % -- -- -- -- -- -- Lying   01/17/24 0200 -- 78  19 -- -- 94 % -- -- -- -- Mid flow nasal cannula -- --   01/17/24 0105 -- 90 18 154/88 118 98 % -- 52 -- 8 L/min Mid flow nasal cannula -- Lyin     Pertinent Labs/Diagnostic Results:     Results from last 7 days   Lab Units 01/17/24  0454 01/16/24  0445 01/15/24  0502 01/14/24  0521 01/13/24  0506 01/12/24  0558 01/11/24  0454   WBC Thousand/uL 9.88 10.19* 9.57 9.04 7.92 3.48* 2.91*   HEMOGLOBIN g/dL 11.1* 10.6* 11.1* 10.8* 11.6 11.4* 12.2   HEMATOCRIT % 33.9* 32.2* 33.2* 33.8* 34.7* 34.2* 37.5   PLATELETS Thousands/uL 284 319 286 271 302 293 332   NEUTROS ABS Thousands/µL  --   --   --   --   --  3.06  --    BANDS PCT %  --   --  6  --  8  --  7     Results from last 7 days   Lab Units 01/17/24  0454 01/16/24  1412 01/16/24  0445 01/15/24  0502 01/14/24  0521 01/13/24  0506   SODIUM mmol/L 145 144 149* 146 147 148*   POTASSIUM mmol/L 3.8 3.5 3.3* 3.4* 3.2* 3.0*   CHLORIDE mmol/L 112* 114* 116* 116* 119* 114*   CO2 mmol/L 25 26 24 21 21 23   ANION GAP mmol/L 8 4 9 9 7 11   BUN mg/dL 18 16 15 15 15 18   CREATININE mg/dL 1.04 0.99 0.93 0.90 0.99 0.96   EGFR ml/min/1.73sq m 59 63 68 71 63 65   CALCIUM mg/dL 8.7 9.3 8.8 8.0* 7.8* 7.5*   MAGNESIUM mg/dL 2.3  --  2.4 1.9 2.0 2.0   PHOSPHORUS mg/dL 2.6*  --  2.4* 2.0* 1.9* 2.5*     Results from last 7 days   Lab Units 01/14/24  0521 01/11/24  0454   AST U/L 81* 97*   ALT U/L 85* 77*   ALK PHOS U/L 102 92   TOTAL PROTEIN g/dL 4.6* 5.8*   ALBUMIN g/dL 2.9* 3.4*   TOTAL BILIRUBIN mg/dL 0.55 0.53     Results from last 7 days   Lab Units 01/17/24  0803 01/16/24  2041 01/16/24  1758 01/16/24  1114 01/16/24  0005 01/15/24  1648 01/15/24  1220 01/15/24  0855 01/14/24  2149 01/14/24  1747 01/14/24  1252 01/14/24  1137   POC GLUCOSE mg/dl 150* 234* 172* 235* 145* 238* 269* 180* 176* 135 214* 212*     Results from last 7 days   Lab Units 01/17/24  0454 01/16/24  1412 01/16/24  0445 01/15/24  0502 01/14/24  0521 01/13/24  0506 01/12/24  0558 01/11/24  0454 01/11/24  0023  01/10/24  1809 01/10/24  1219   GLUCOSE RANDOM mg/dL 99 103 126 136 112 120 154* 185* 168* 155* 136     Results from last 7 days   Lab Units 01/16/24  1412   OSMOLALITY, SERUM mmol/*     Results from last 7 days   Lab Units 01/11/24  0512   PH ART  7.350   PCO2 ART mm Hg 32.6*   PO2 ART mm Hg 76.7   HCO3 ART mmol/L 17.6*   BASE EXC ART mmol/L -7.0   O2 CONTENT ART mL/dL 17.1   O2 HGB, ARTERIAL % 94.0     Results from last 7 days   Lab Units 01/12/24  0558 01/11/24  1519   PROTIME seconds  --  16.3*   INR   --  1.32*   PTT seconds 32  --      Results from last 7 days   Lab Units 01/12/24  0558   TSH 3RD GENERATON uIU/mL 4.570*     Results from last 7 days   Lab Units 01/16/24  0445 01/14/24  0521 01/11/24  0611   PROCALCITONIN ng/ml 0.14 0.32* 0.37*     Results from last 7 days   Lab Units 01/11/24  0454   LACTIC ACID mmol/L 1.3     Results from last 7 days   Lab Units 01/14/24  0521 01/11/24  0454   CRP mg/L 21.9* 76.6*     Results from last 7 days   Lab Units 01/16/24  1412   OSMOLALITY, SERUM mmol/*   OSMO UR mmol/     Results from last 7 days   Lab Units 01/16/24  1412   CLARITY UA  Clear   COLOR UA  Light Yellow   SPEC GRAV UA  1.010   PH UA  7.0   GLUCOSE UA mg/dl Negative   KETONES UA mg/dl Negative   BLOOD UA  Negative   PROTEIN UA mg/dl Trace*   NITRITE UA  Negative   BILIRUBIN UA  Negative   UROBILINOGEN UA (BE) mg/dl <2.0   LEUKOCYTES UA  Negative   WBC UA /hpf 2-4*   RBC UA /hpf 1-2   BACTERIA UA /hpf None Seen   EPITHELIAL CELLS WET PREP /hpf Occasional   SODIUM UR  105     Results from last 7 days   Lab Units 01/10/24  2115   STREP PNEUMONIAE ANTIGEN, URINE  Negative   LEGIONELLA URINARY ANTIGEN  Negative     Results from last 7 days   Lab Units 01/15/24  1544 01/12/24  0408   GRAM STAIN RESULT  Rare Mononuclear Cells  No organisms seen No No Polys or Bacteria seen     Results from last 7 days   Lab Units 01/15/24  1544   TOTAL COUNTED  8     Results from last 7 days   Lab Units  01/15/24  1546 01/15/24  1544 01/12/24  0408   APPEARANCE CSF   --  clear, colorless light pink   TUBE NUM CSF   --  4 2   WBC CSF /uL  --  1 1   XANTHOCHROMIA   --  No No   MONOCYTES % (CSF) %  --  88  --    GLUCOSE CSF mg/dL  --  113*  --    PROTEIN CSF mg/dL  --  50*  --    RBC CSF uL  --  25*  --    CSF CULTURE  No growth  --  No growth     Results from last 7 days   Lab Units 01/11/24  0454   VANCOMYCIN RM ug/mL 28.7*     Medications:   Scheduled Medications:  chlorhexidine, 15 mL, Mouth/Throat, Q12H SARTHAK  dexamethasone, 0.1 mg/kg, Intravenous, Q12H, Daily  enoxaparin, 40 mg, Subcutaneous  insulin lispro, 2-12 Units, Subcutaneous, TID With Meals  insulin lispro, 2-12 Units, Subcutaneous, HS  lamoTRIgine, 150 mg, Oral, BID  melatonin, 6 mg, Oral, HS  polyethylene glycol, 17 g, Oral, Daily  potassium phosphate, 12 mmol, Intravenous, Once  QUEtiapine, 25 mg, Oral, HS  remdesivir, 100 mg, Intravenous, Q24H  senna-docusate sodium, 2 tablet, Oral, BID  topiramate, 100 mg, Oral, BID  venlafaxine, 25 mg, Oral, TID With Meals    PRN Meds:  acetaminophen, 650 mg, Oral, Q6H PRN  bisacodyl, 10 mg, Rectal, Daily PRN  ondansetron, 4 mg, Intravenous, Q6H PRN    Discharge Plan: d    Network Utilization Review Department  ATTENTION: Please call with any questions or concerns to [phone] and carefully listen to the prompts so that you are directed to the right person. All voicemails are confidential.   For Discharge needs, contact Care Management DC Support Team at [phone] opt. 2  Send all requests for admission clinical reviews, approved or denied determinations and any other requests to dedicated fax number below belonging to the campus where the patient is receiving treatment. List of dedicated fax numbers for the Facilities:  FACILITY NAME UR FAX NUMBER   ADMISSION DENIALS (Administrative/Medical Necessity) [phone]   DISCHARGE SUPPORT TEAM (NETWORK) [phone]   [facility]  (Maternity/NICU/Pediatrics) [phone]   [facility] [phone]   [facility] [phone]   [facility] [phone]   [facility] [phone]   [facility] [phone]   [facility] [phone]   [facility] [phone]   [facility] [phone]   [facility] [phone]   [facility] [phone]   [facility] [phone]

## 2024-01-17 NOTE — SPEECH THERAPY NOTE
"Speech Language/Pathology  Speech-Language Pathology Progress Note      Patient Name: Carla Hunt    Today's Date: 1/17/2024      Subjective:  Pt was awake and alert. She was sitting up in chair at bedside. Patient stated \"I would like some regular chicken\".    Objective:  Pt was seen today for dysphagia therapy. Current diet is dysphagia 3 dental soft with thin liquids. Pt was on mid flow O2.  Focus of today's session was to maximize PO intake safety and determine potential for diet texture advancement. Textures offered today included regular solid, hard solid, and thin liquid via straw.  Swallow function:   Bolus retrieval, control, and mastication were WFL.  AP transfer was WFL and timely. Pharyngeal swallow initiation was present and timely. Hyolaryngeal excursion was WFL. No s/s aspiration occurred during session today.  She was able to cut and self feed the chicken and other material on her plate.     Assessment:  Pt able to tolerate regular solids w/ thin liquids w/o any overt s/s aspiration.   Plan:  Change diet texture to regular and thin liquids. No additional ST f/u needed at this time. Please re consult with any new changes or concerns.   "

## 2024-01-17 NOTE — PROGRESS NOTES
Patient: Ishmael Herrera  : 1955  MRN: [id number]  Age: 66 year old      HPI:  Mr. Herrera is seen today for follow-up.  He was troubled by intermittent mild palpitations in late  and presented to [facility] shortly after 2020 with what proved to be persistent atrial fibrillation-- status post successful DC cardioversion at [facility] 2021.  His evaluation did not reveal an acute coronary process or CHF.  An echocardiogram was unremarkable in terms of LV systolic function and valve disease.  He was discharged on Xarelto and metoprolol.  His hydralazine and candesartan were discontinued.  At this point he is feeling reasonably well.  He continues to have migratory chest discomforts that I do not believe represent myocardial ischemia--most consistent with musculoskeletal etiology.  His chest discomfort does not occur during his vigorous exercise.  He has established diagnoses of hypertension and obstructive sleep apnea (well controlled on CPAP), significant obesity and gastroesophageal reflux.  His GERD was quite troublesome earlier this year with persistent chest discomfort and a cough as well--he is following with a gastroenterologist.  This is much improved over the last year.  Blood pressure control is good with home numbers consistently about 135/70-80 or so--he checks his blood pressure consistently. He does not have a clinical history of ischemic heart disease or CHF.  He does not experience any chest discomfort, unusual fatigue or shortness of breath during exercise.  He has lost about 50 pounds over the last 12 months.  His back surgery in 2019 went well and he is satisfied with the results.  He completed the required physical therapy and that went well.  He is back to a good exercise program--a mixed program of resistance and aerobic exercise at physical therapy.  He has also experienced a number of very brief vertigo spells in 2019 lasting up to a few seconds at a time.  Progress Note - Infectious Disease   Carla Hunt 58 y.o. female MRN: [id number]  Unit/Bed#: ICU 13 Encounter: [id number]      Impression/Recommendations:  SIRS.  If patient does indeed had seizure, SIRS may be all secondary to it.   Consideration for COVID and bacterial pneumonia contributing to this.  Patient is overall clinically proved and she remains systemically nontoxic.  Temperature is down.  WBC normalized.  Admission blood cultures have no growth.  Patient completed antibiotic course.  She remains on treatment for COVID.  COVID treatment as in below.    Monitor temperature/WBC.     Encephalopathy.  Head CT and MRI did not show acute CVA or any mass lesion.  There is no seizure on EEG monitoring now but it is unclear whether patient may have a seizure during recent hospitalization at [facility] prior to transfer.  Clinical picture was never suggestive of bacterial meningitis but HSV encephalitis was possible.  However, patient had 2 lumbar punctures, both with no evidence of CSF inflammation and negative HSV PCR.  Patient had been on IV acyclovir but this was discontinued.  Mental status continues to improve.  No further anti-infective needed for this.  Monitor mental status.     Severe COVID, with acute hypoxic respiratory failure.  Patient is currently on remdesivir and dexamethasone and did receive 1 dose of Tocilizumab.  CXR with relatively mild opacities.  With elevated procalcitonin, there is concern of possible concurrent bacterial pneumonia.  Patient did receive initial rounds of COVID vaccination but not the new COVID vaccination available this past fall.  Procalcitonin decreasing.  Patient completed antibiotic course for bacterial pneumonia.  He remains on remdesivir and dexamethasone.  Continue remdesivir and dexamethasone.  Observe off further antibiotic.  Monitor respiratory symptoms and O2 saturation.     Acute hypoxic respiratory failure, likely multifactorial, with COVID   These were not initially positional but that became a feature.  He is engaged with an ENT practice for evaluation of these episodes.  They do not seem to represent orthostatic hypotension.  He did have an emergency department visit in late June 2020 to the [facility] emergency department with this complaint.  There were no significant findings.  A head CT was performed which was reported as unremarkable.  Lab panels were unremarkable.  Rhythm strip is reported as normal sinus rhythm without ectopy.  He did vestibular rehab and vertigo is almost completely gone now.  Echocardiogram in 2016 showed a mildly enlarged left ventricle with mild concentric LVH and ejection fraction 65%.  The left atrium was severely dilated.  The right atrium was moderately dilated.  There was no significant valve pathology and the right ventricle was about normal in size and function.  Exercise stress echocardiogram was performed in the office December 2019 and August 2021 with normal results--details below.  His weight is up about 11 pounds in the last 6 months.      Review of Systems   Cardiovascular: Positive for leg swelling. Negative for chest pain and palpitations.   All other systems reviewed and are negative.      Current Outpatient Medications   Medication Sig Dispense Refill   • Silodosin 8 MG Cap Take 8 mg by mouth daily.     • metoPROLOL tartrate (LOPRESSOR) 25 MG tablet TAKE 1 TABLET BY MOUTH EVERY 12 HOURS 180 tablet 3   • Xarelto 20 MG Tab TAKE 1 TABLET BY MOUTH DAILY WITH DINNER 90 tablet 3   • brimonidine (Alphagan P) 0.1 % ophthalmic solution 1 drop 3 times daily.     • dorzolamide (TRUSOPT) 2 % ophthalmic solution 1 drop 3 times daily.     • Netarsudil Dimesylate (Rhopressa) 0.02 % Solution      • latanoprostene (Vyzulta) 0.024 % opthalmic solution 1 drop nightly.     • famotidine (PEPCID) 10 MG tablet Take 10 mg by mouth 2 times daily.     • amLODIPine (NORVASC) 10 MG tablet Take 10 mg by mouth daily.     •  contributing.  Respiratory status improving, with O2 support weaning.  O2 support per critical service.     CKD.  Creatinine improved from admission.  Antibiotic and antiviral dosages adjusted accordingly.  Monitor creatinine.     Seizure disorder, status post temporal lobe resection in .  There is no active seizure on EEG monitoring at present but it is unclear whether patient may have had seizure earlier.  Neurology follow-up.     B-cell lymphoma, on chemotherapy.  Patient was leukopenic admission but not neutropenic.  WBC is now in normal range.  Monitor WBC/ANC.     Discussed with patient in detail regarding the above plan.     Antibiotics:  Off antibiotic/antiviral     Subjective:  Patient remains in ICU.  She is awake and alert today, with improving confusion.  No headache.  Improving dyspnea.  Stable nonproductive cough.  She remains on mid flow supplement.  Temperature stays down.  No chills.  She is tolerating antibiotic well.  No nausea, vomiting or diarrhea.      Objective:  Vitals:  Temp:  [97.7 °F (36.5 °C)-99.3 °F (37.4 °C)] 99.3 °F (37.4 °C)  HR:  [] 122  Resp:  [18-26] 25  BP: (116-176)/(64-89) 127/68  SpO2:  [82 %-100 %] 93 %  Temp (24hrs), Av.7 °F (37.1 °C), Min:97.7 °F (36.5 °C), Max:99.3 °F (37.4 °C)  Current: Temperature: 99.3 °F (37.4 °C)    Physical Exam:     General: Awake, alert, confusion, comfortable, nontoxic.   Neck:  Supple. No mass.  No lymphadenopathy.   Lungs: Expansion symmetric, stable basilar rhonchi, no rales, no wheezing, respirations unlabored.   Heart:  Tachycardic with regular rhythm, S1 and S2 normal, no murmur.   Abdomen: Soft, nondistended, non-tender, bowel sounds active all four quadrants, no masses, no organomegaly.   Extremities: No edema. No erythema/warmth. No ulcer. Nontender to palpation.   Skin:  No rash.   Neuro: Moves all extremities.     Invasive Devices       Peripheral Intravenous Line  Duration             Peripheral IV 01/15/24 Left;Upper Arm  2 days    Peripheral IV 01/16/24 Right Antecubital <1 day                    Labs studies:   I have personally reviewed pertinent labs.  Results from last 7 days   Lab Units 01/17/24  0454 01/16/24  1412 01/16/24  0445 01/15/24  0502 01/14/24  0521 01/12/24  0558 01/11/24  0454   POTASSIUM mmol/L 3.8 3.5 3.3*   < > 3.2*   < > 3.9   CHLORIDE mmol/L 112* 114* 116*   < > 119*   < > 120*   CO2 mmol/L 25 26 24   < > 21   < > 20*   BUN mg/dL 18 16 15   < > 15   < > 26*   CREATININE mg/dL 1.04 0.99 0.93   < > 0.99   < > 1.23   EGFR ml/min/1.73sq m 59 63 68   < > 63   < > 48   CALCIUM mg/dL 8.7 9.3 8.8   < > 7.8*   < > 9.0   AST U/L  --   --   --   --  81*  --  97*   ALT U/L  --   --   --   --  85*  --  77*   ALK PHOS U/L  --   --   --   --  102  --  92    < > = values in this interval not displayed.     Results from last 7 days   Lab Units 01/17/24  0454 01/16/24  0445 01/15/24  0502   WBC Thousand/uL 9.88 10.19* 9.57   HEMOGLOBIN g/dL 11.1* 10.6* 11.1*   PLATELETS Thousands/uL 284 319 286     Results from last 7 days   Lab Units 01/15/24  1544 01/12/24  0408 01/10/24  2115   GRAM STAIN RESULT  Rare Mononuclear Cells  No organisms seen No No Polys or Bacteria seen  --    LEGIONELLA URINARY ANTIGEN   --   --  Negative       Imaging Studies:   I have personally reviewed pertinent imaging study reports and images in PACS.    EKG, Pathology, and Other Studies:   I have personally reviewed pertinent reports.                            Dexlansoprazole 30 MG CAPSULE DELAYED RELEASE Take 30 mg by mouth daily.       No current facility-administered medications for this visit.       ALLERGIES:  Penicillins    Visit Vitals  BP (!) 150/70 (BP Location: LUE - Left upper extremity, Patient Position: Sitting)   Pulse (!) 50   Wt 124 kg (273 lb 5.9 oz)   SpO2 100%   BMI 40.37 kg/m²     Physical Exam  Vitals reviewed.   Constitutional:       Appearance: He is well-developed.   HENT:      Head: Normocephalic and atraumatic.   Eyes:      Conjunctiva/sclera: Conjunctivae normal.      Pupils: Pupils are equal, round, and reactive to light.   Neck:      Vascular: No JVD.      Trachea: No tracheal deviation.   Cardiovascular:      Rate and Rhythm: Regular rhythm. Bradycardia present.      Heart sounds: Murmur heard.    Crescendo systolic murmur is present with a grade of 1/6.  No friction rub. No gallop.       Comments: Systolic ejection murmur at the right upper sternal border  Pulmonary:      Effort: Pulmonary effort is normal. No respiratory distress.      Breath sounds: Normal breath sounds. No stridor. No wheezing or rales.   Chest:      Chest wall: No tenderness.   Abdominal:      General: There is no distension.      Palpations: Abdomen is soft.      Tenderness: There is no abdominal tenderness.   Musculoskeletal:         General: Normal range of motion.      Cervical back: Normal range of motion and neck supple.      Right lower leg: Edema present.      Left lower leg: Edema present.      Comments: Very mild edema in both lower extremities   Skin:     General: Skin is warm and dry.   Neurological:      Mental Status: He is alert and oriented to person, place, and time.   Psychiatric:         Behavior: Behavior normal.         Thought Content: Thought content normal.         Judgment: Judgment normal.         Data: EKG October 2020 showed normal sinus rhythm at 76 bpm with occasional single premature atrial contractions and mild nonspecific intraventricular  conduction delay.  EKG December 2020 showed normal sinus rhythm at 61 bpm with mild nonspecific intraventricular conduction delay and a single PAC--tracing is unchanged from prior.  EKG January 2021 showed atrial fibrillation at 82 bpm with mild nonspecific IVCD and no other diagnostic abnormalities.  EKG February 2021 showed moderate sinus bradycardia at 47 bpm with a single PVC but is otherwise unremarkable.    Cardioversion at the hospital January 2021:  This is a report of an outpatient direct-current cardioversion performed to address persistent atrial fibrillation  The patient has been on uninterrupted oral anticoagulation for about 4 weeks  Brevital was used for deep sedation given in divided doses to a total dose of 80 mg  An initial 100 J synchronized biphasic shock restored sinus rhythm but atrial fibrillation recurred after about 30 seconds  A second 100 J synchronized biphasic shock again restored sinus rhythm which persisted  There were no immediate complications  The patient will continue on his current medical program including oral anticoagulation for at least 3 weeks following his cardioversion    In December 2019 he had a normal NT proBNP at 50 and a normal basic metabolic panel.    PA lateral chest x-ray was normal in December 2020.  Normal LETTY study November 2020    Stress echocardiogram here in the office December 2019:  1. Stress echo: Left ventricular ejection fraction was normal at rest and     with stress. There is no evidence for stress-induced ischemia.  2. Procedure narrative: Treadmill exercise testing was performed using the     Cirilo protocol. The patient exercised for 4 min 1 sec, to protocol     stage 2, to a maximal work rate of 5.8 mets. Exercise was terminated due     to moderate fatigue. Post-stress images obtained within 90 seconds of     peak stress.  3. Baseline ECG: Isolated atrial ectopy but otherwise a normal tracing  4. Stress: Maximal heart rate during stress was 141bpm  (90% of maximal     predicted heart rate). The maximal predicted heart rate was 157bpm.The     target heart rate was 133bpm.The target heart rate was achieved. The     heart rate response to stress is normal. There is a normal resting     blood pressure with an appropriate response to stress. Functional     capacity is decreased (greater than 40%).  5. Stress ECG conclusions: The stress ECG is negative for ischemia.  Impressions:  Normal maximal exercise stress echocardiogram. Below average  exercise capacity is noted.    Echocardiogram at [facility] in late December 2020 is reported as normal regional global LV systolic function, no significant valve pathology and moderately dilated atria.  The aortic root and ascending aorta were mildly dilated.    Normal LETTY study at [facility] November 2020    Stress echocardiogram here in the office August 2021:  1. Stress echo: Left ventricular ejection fraction was normal at rest and     with stress. There is no evidence for stress-induced ischemia.  2. Procedure narrative: Treadmill exercise testing was performed using the     Cirilo protocol. The patient exercised for 6 min, to protocol stage 2,     to a maximal work rate of 7.3mets. Exercise was terminated due to     achievement of target heart rate. Post-stress images obtained within 90     seconds of peak stress.  3. Left ventricle: The ejection fraction was measured by visual     estimation. The ejection fraction is 60%.  4. Baseline ECG: Normal.  5. Stress: Stress testing did not produce any symptoms.  6. Stress ECG conclusions: Occasional ventricular ectopy--present in early     exercise and not present at peak exercise. The stress ECG is negative     for ischemia.  Impressions:  Normal maximal exercise stress echocardiogram.      Impression/Plan:  Essential hypertension, benign  Blood pressure is not ideally controlled--adding losartan 25 mg daily.  I asked him to call me with some blood pressure readings over the  next few weeks and we can titrate the losartan upward if needed targeting blood pressure about 120/80.    MARIVEL on CPAP  Well-established on CPAP    Atrial fibrillation  He had persistent atrial fibrillation beginning in late December 2020 and remains on Xarelto--he had a cardioversion at [facility] on January 25, 2021. He continues on metoprolol tartrate 25 mg twice daily--his moderate resting sinus bradycardia is noted.  He had minimal symptoms related to atrial fibrillation.  I am inclined to leave him on Xarelto at this point with a [id number]=3.      Kyaw Del Toro MD

## 2024-01-17 NOTE — ASSESSMENT & PLAN NOTE
Lab Results   Component Value Date    EGFR 63 01/16/2024    EGFR 68 01/16/2024    EGFR 71 01/15/2024    CREATININE 0.99 01/16/2024    CREATININE 0.93 01/16/2024    CREATININE 0.90 01/15/2024     Monitor BMP

## 2024-01-17 NOTE — PHYSICAL THERAPY NOTE
PHYSICAL THERAPY TREATMENT  NAME:  Carla Hunt  DATE: 24    AGE:   58 y.o.  Mrn:   [id number]  ADMIT DX:  Stroke-like symptoms [R29.90]    Past Medical History:   Diagnosis Date    Hx of hypercalcemia     Lymphoma (HCC) 2021    Parathyroid disease (HCC)     Seizures (HCC)     Situational depression     24 yr old son  from drug overdose     Past Surgical History:   Procedure Laterality Date    CRANIOTOMY FOR TEMPORAL LOBECTOMY Left 2007       Length Of Stay: 7     24 1129   PT Last Visit   PT Visit Date 24   Note Type   Note Type Treatment   Pain Assessment   Pain Assessment Tool 0-10   Pain Score No Pain   Restrictions/Precautions   Weight Bearing Precautions Per Order No   Other Precautions Chair Alarm;Cognitive;Bed Alarm;Multiple lines;Telemetry;O2;Fall Risk  (on HFNC)   General   Chart Reviewed Yes   Cognition   Attention Attends with cues to redirect   Orientation Level Oriented X4   Memory Decreased recall of precautions   Following Commands Follows one step commands without difficulty   Comments motivated for participation in PT   Subjective   Subjective pt states she feels better- is eager to sit up and owrk w/ PT/OT   Bed Mobility   Supine to Sit 4  Minimal assistance   Additional items Assist x 1;Increased time required;Verbal cues   Additional Comments EOB 25-30  min for therex as documented below; STS x 5; marching and reaching/ static / dynamic activities w/ rest b/t for recovery- Spo drops to low 80's at times w/ stnading therex- recovering to >90 w/ 2-3 mins seated rest + cues for breathing techniques.   Transfers   Sit to Stand 4  Minimal assistance   Stand to Sit 4  Minimal assistance   Ambulation/Elevation   Gait pattern Excessively slow;Short stride;Shuffling;Decreased foot clearance   Gait Assistance 3  Moderate assist   Additional items Assist x 1;Verbal cues;Tactile cues   Assistive Device Rolling walker;None   Distance 3' x3 advance retreat w/HHA then  marching in place w/ RW and Deedee- seated rest b/t ffor recovery. fatigues easily   Stair Management Assistance Not tested   Balance   Static Sitting Fair   Dynamic Sitting Fair -   Static Standing Poor +   Dynamic Standing Poor +   Ambulatory Poor   Endurance Deficit   Endurance Deficit Yes   Endurance Deficit Description hypoxia on HFNC-limtied activity tolerance w/ inc time for recovery b/t functional tasks   Activity Tolerance   Activity Tolerance Patient limited by fatigue;Patient limited by pain   Medical Staff Made Aware yes- OT/RN (Christian)   Exercises   Heelslides Supine;15 reps   Hip Flexion Sitting;10 reps  (x3)   Hip Abduction Sitting;10 reps  (x3)   Hip Adduction Sitting;10 reps  (x3)   Knee AROM Long Arc Quad Sitting;10 reps  (xx3)   Ankle Pumps Sitting;25 reps  (x2)   Balance training  ; STS; standing marching   Assessment   Prognosis Good   Problem List Decreased strength;Decreased range of motion;Decreased endurance;Impaired balance;Decreased mobility;Impaired judgement;Decreased safety awareness   Assessment pt seen for PT session as noted above. Pt presents in bed on HFNC; agreeable and cleared for mobilization by RN + CC team.  Pt required much less assist for supine> sit> EOB transition + was able to tolerate prolonged seated activities EOB  on HFNC- pt w/ desaturations to low 80's w/ standing activities and did required 2-4 min seated rest breaks w/ instruction/ education in breathing techniques for recovery.  Pt w/ excellent progress from last session. Pt OOB in chair post session w/ all needs in reach. Pt very pleased w/ progress thus far. PT will continue to follow nad progress.   Goals   Patient Goals get better; not have to use a RW to walk   LTG Expiration Date 01/29/24   PT Treatment Day 1   Plan   Treatment/Interventions ADL retraining;Functional transfer training;LE strengthening/ROM;Elevations;Therapeutic exercise;Endurance training;Patient/family training;Equipment eval/education;Bed  mobility;Gait training;Compensatory technique education;Continued evaluation;Spoke to nursing;Spoke to case management;Spoke to advanced practitioner;OT   Discharge Recommendation   Rehab Resource Intensity Level, PT II (Moderate Resource Intensity)   Equipment Recommended   (TBD)   AM-PAC Basic Mobility Inpatient   Turning in Flat Bed Without Bedrails 3   Lying on Back to Sitting on Edge of Flat Bed Without Bedrails 3   Moving Bed to Chair 2   Standing Up From Chair Using Arms 2   Walk in Room 2   Climb 3-5 Stairs With Railing 1   Basic Mobility Inpatient Raw Score 13   Basic Mobility Standardized Score 33.99   Highest Level Of Mobility   -HLM Goal 4: Move to chair/commode   JH-HLM Achieved 5: Stand (1 or more minutes)     The patient's AM-PAC Basic Mobility Inpatient Short Form Raw Score is 13. A Raw score of less than or equal to 16 suggests the patient may benefit from discharge to post-acute rehabilitation services. Please also refer to the recommendation of the Physical Therapist for safe discharge planning.            Moon Ventura, PT

## 2024-01-18 LAB
ANISOCYTOSIS BLD QL SMEAR: PRESENT
BACTERIA CSF CULT: NO GROWTH
BASOPHILS # BLD MANUAL: 0 THOUSAND/UL (ref 0–0.1)
BASOPHILS NFR MAR MANUAL: 0 % (ref 0–1)
EOSINOPHIL # BLD MANUAL: 0 THOUSAND/UL (ref 0–0.4)
EOSINOPHIL NFR BLD MANUAL: 0 % (ref 0–6)
ERYTHROCYTE [DISTWIDTH] IN BLOOD BY AUTOMATED COUNT: 14.9 % (ref 11.6–15.1)
GLUCOSE SERPL-MCNC: 134 MG/DL (ref 65–140)
GLUCOSE SERPL-MCNC: 136 MG/DL (ref 65–140)
GLUCOSE SERPL-MCNC: 247 MG/DL (ref 65–140)
GLUCOSE SERPL-MCNC: 255 MG/DL (ref 65–140)
HCT VFR BLD AUTO: 35.9 % (ref 34.8–46.1)
HGB BLD-MCNC: 11.7 G/DL (ref 11.5–15.4)
INR PPP: 1.21 (ref 0.84–1.19)
LYMPHOCYTES # BLD AUTO: 0 % (ref 14–44)
LYMPHOCYTES # BLD AUTO: 0 THOUSAND/UL (ref 0.6–4.47)
MCH RBC QN AUTO: 29.2 PG (ref 26.8–34.3)
MCHC RBC AUTO-ENTMCNC: 32.6 G/DL (ref 31.4–37.4)
MCV RBC AUTO: 90 FL (ref 82–98)
MICROCYTES BLD QL AUTO: PRESENT
MONOCYTES # BLD AUTO: 0.32 THOUSAND/UL (ref 0–1.22)
MONOCYTES NFR BLD: 3 % (ref 4–12)
MYELOCYTES NFR BLD MANUAL: 1 % (ref 0–1)
NEUTROPHILS # BLD MANUAL: 10.34 THOUSAND/UL (ref 1.85–7.62)
NEUTS BAND NFR BLD MANUAL: 1 % (ref 0–8)
NEUTS SEG NFR BLD AUTO: 95 % (ref 43–75)
PLATELET # BLD AUTO: 269 THOUSANDS/UL (ref 149–390)
PLATELET BLD QL SMEAR: ADEQUATE
PMV BLD AUTO: 10.9 FL (ref 8.9–12.7)
POIKILOCYTOSIS BLD QL SMEAR: PRESENT
POLYCHROMASIA BLD QL SMEAR: PRESENT
PROTHROMBIN TIME: 15.1 SECONDS (ref 11.6–14.5)
RBC # BLD AUTO: 4.01 MILLION/UL (ref 3.81–5.12)
RBC MORPH BLD: PRESENT
WBC # BLD AUTO: 10.77 THOUSAND/UL (ref 4.31–10.16)

## 2024-01-18 PROCEDURE — 85610 PROTHROMBIN TIME: CPT

## 2024-01-18 PROCEDURE — 85007 BL SMEAR W/DIFF WBC COUNT: CPT | Performed by: FAMILY MEDICINE

## 2024-01-18 PROCEDURE — 99232 SBSQ HOSP IP/OBS MODERATE 35: CPT | Performed by: INTERNAL MEDICINE

## 2024-01-18 PROCEDURE — 82948 REAGENT STRIP/BLOOD GLUCOSE: CPT

## 2024-01-18 PROCEDURE — 94760 N-INVAS EAR/PLS OXIMETRY 1: CPT

## 2024-01-18 PROCEDURE — 85027 COMPLETE CBC AUTOMATED: CPT | Performed by: FAMILY MEDICINE

## 2024-01-18 PROCEDURE — 94664 DEMO&/EVAL PT USE INHALER: CPT

## 2024-01-18 PROCEDURE — 80053 COMPREHEN METABOLIC PANEL: CPT | Performed by: FAMILY MEDICINE

## 2024-01-18 PROCEDURE — 99232 SBSQ HOSP IP/OBS MODERATE 35: CPT | Performed by: FAMILY MEDICINE

## 2024-01-18 RX ADMIN — INSULIN LISPRO 6 UNITS: 100 INJECTION, SOLUTION INTRAVENOUS; SUBCUTANEOUS at 21:03

## 2024-01-18 RX ADMIN — VENLAFAXINE 25 MG: 25 TABLET ORAL at 08:03

## 2024-01-18 RX ADMIN — CHLORHEXIDINE GLUCONATE 15 ML: 1.2 SOLUTION ORAL at 08:01

## 2024-01-18 RX ADMIN — ENOXAPARIN SODIUM 40 MG: 40 INJECTION SUBCUTANEOUS at 08:00

## 2024-01-18 RX ADMIN — POLYETHYLENE GLYCOL 3350 17 G: 17 POWDER, FOR SOLUTION ORAL at 08:01

## 2024-01-18 RX ADMIN — SENNOSIDES, DOCUSATE SODIUM 2 TABLET: 8.6; 5 TABLET ORAL at 08:01

## 2024-01-18 RX ADMIN — LAMOTRIGINE 150 MG: 100 TABLET ORAL at 21:00

## 2024-01-18 RX ADMIN — CHLORHEXIDINE GLUCONATE 15 ML: 1.2 SOLUTION ORAL at 21:00

## 2024-01-18 RX ADMIN — TOPIRAMATE 100 MG: 100 TABLET, FILM COATED ORAL at 08:01

## 2024-01-18 RX ADMIN — INSULIN LISPRO 4 UNITS: 100 INJECTION, SOLUTION INTRAVENOUS; SUBCUTANEOUS at 12:23

## 2024-01-18 RX ADMIN — TOPIRAMATE 100 MG: 100 TABLET, FILM COATED ORAL at 17:01

## 2024-01-18 RX ADMIN — QUETIAPINE 25 MG: 25 TABLET ORAL at 21:00

## 2024-01-18 RX ADMIN — DEXAMETHASONE SODIUM PHOSPHATE 7.7 MG: 10 INJECTION, SOLUTION INTRAMUSCULAR; INTRAVENOUS at 04:09

## 2024-01-18 RX ADMIN — DEXAMETHASONE SODIUM PHOSPHATE 7.7 MG: 10 INJECTION, SOLUTION INTRAMUSCULAR; INTRAVENOUS at 16:49

## 2024-01-18 RX ADMIN — MELATONIN 6 MG: at 21:00

## 2024-01-18 RX ADMIN — REMDESIVIR 100 MG: 100 INJECTION, POWDER, LYOPHILIZED, FOR SOLUTION INTRAVENOUS at 17:01

## 2024-01-18 RX ADMIN — LAMOTRIGINE 150 MG: 100 TABLET ORAL at 08:01

## 2024-01-18 RX ADMIN — SENNOSIDES, DOCUSATE SODIUM 2 TABLET: 8.6; 5 TABLET ORAL at 17:01

## 2024-01-18 RX ADMIN — VENLAFAXINE 25 MG: 25 TABLET ORAL at 16:51

## 2024-01-18 NOTE — PROGRESS NOTES
Progress Note - Infectious Disease   Carla Hunt 58 y.o. female MRN: [id number]  Unit/Bed#: [facility] 722-01 Encounter: [id number]      Impression/Recommendations:  SIRS.  If patient does indeed had seizure, SIRS may be all secondary to it.   Consideration for COVID and bacterial pneumonia contributing to this.  Patient is overall clinically proved and she remains systemically nontoxic.  Temperature is down.  WBC normalized.  Admission blood cultures have no growth.  Patient completed antibiotic course.  She remains on treatment for COVID.  COVID treatment as in below.    Monitor temperature/WBC.     Encephalopathy.  Head CT and MRI did not show acute CVA or any mass lesion.  There is no seizure on EEG monitoring now but it is unclear whether patient may have a seizure during recent hospitalization at [facility] prior to transfer.  Clinical picture was never suggestive of bacterial meningitis but HSV encephalitis was possible.  However, patient had 2 lumbar punctures, both with no evidence of CSF inflammation and negative HSV PCR.  Patient had been on IV acyclovir but this was discontinued.  Mental status is back to normal.  No further anti-infective needed for this.  Monitor mental status.     Severe COVID, with acute hypoxic respiratory failure.  Patient is currently on remdesivir and dexamethasone and did receive 1 dose of Tocilizumab.  CXR with relatively mild opacities.  With elevated procalcitonin, there is concern of possible concurrent bacterial pneumonia.  Patient did receive initial rounds of COVID vaccination but not the new COVID vaccination available this past fall.  Procalcitonin decreasing.  Patient completed antibiotic course for bacterial pneumonia.  She remains on remdesivir and dexamethasone.  Completing course of remdesivir and dexamethasone.  Observe off further antibiotic.  Monitor respiratory symptoms and O2 saturation.     Acute hypoxic respiratory failure, likely multifactorial,  with COVID contributing.  Respiratory status improving, with O2 support weaning.  O2 support per critical service.     CKD.  Creatinine improved from admission.  Antibiotic and antiviral dosages adjusted accordingly.  Monitor creatinine.     Seizure disorder, status post temporal lobe resection in .  There is no active seizure on EEG monitoring at present but it is unclear whether patient may have had seizure earlier.  Neurology follow-up.     B-cell lymphoma, on chemotherapy.  Patient was leukopenic admission but not neutropenic.  WBC is now in normal range.  Monitor WBC/ANC.     Discussed with patient in detail regarding the above plan.     Antibiotics:  Off antibiotic     Subjective:  Patient is out of ICU.  She is awake and alert, with no further confusion.  No headache.  Mild and improving dyspnea.  Stable nonproductive cough.  She remains on mid flow supplement.  Temperature stays down.  No chills.  She is tolerating antibiotic well.  No nausea, vomiting or diarrhea.       Objective:  Vitals:  Temp:  [98.1 °F (36.7 °C)-98.3 °F (36.8 °C)] 98.1 °F (36.7 °C)  HR:  [] 101  Resp:  [15-23] 18  BP: (120-142)/(66-83) 141/83  SpO2:  [88 %-95 %] 88 %  Temp (24hrs), Av.2 °F (36.8 °C), Min:98.1 °F (36.7 °C), Max:98.3 °F (36.8 °C)  Current: Temperature: 98.1 °F (36.7 °C)    Physical Exam:     General: Awake, alert, cooperative, no distress.   Neck:  Supple. No mass.  No lymphadenopathy.   Lungs: Expansion symmetric, basilar rhonchi, no rales, no wheezing, respirations unlabored.   Heart:  Regular rate and rhythm, S1 and S2 normal, no murmur.   Abdomen: Soft, nondistended, non-tender, bowel sounds active all four quadrants, no masses, no organomegaly.   Extremities: No edema. No erythema/warmth. No ulcer. Nontender to palpation.   Skin:  No rash.   Neuro: Moves all extremities.     Invasive Devices       Peripheral Intravenous Line  Duration             Peripheral IV 01/15/24 Left;Upper Arm 3 days     Peripheral IV 01/16/24 Right Antecubital 1 day                    Labs studies:   I have personally reviewed pertinent labs.  Results from last 7 days   Lab Units 01/17/24  0454 01/16/24  1412 01/16/24  0445 01/15/24  0502 01/14/24  0521   POTASSIUM mmol/L 3.8 3.5 3.3*   < > 3.2*   CHLORIDE mmol/L 112* 114* 116*   < > 119*   CO2 mmol/L 25 26 24   < > 21   BUN mg/dL 18 16 15   < > 15   CREATININE mg/dL 1.04 0.99 0.93   < > 0.99   EGFR ml/min/1.73sq m 59 63 68   < > 63   CALCIUM mg/dL 8.7 9.3 8.8   < > 7.8*   AST U/L  --   --   --   --  81*   ALT U/L  --   --   --   --  85*   ALK PHOS U/L  --   --   --   --  102    < > = values in this interval not displayed.     Results from last 7 days   Lab Units 01/18/24  0949 01/17/24  0454 01/16/24  0445   WBC Thousand/uL 10.77* 9.88 10.19*   HEMOGLOBIN g/dL 11.7 11.1* 10.6*   PLATELETS Thousands/uL 269 284 319     Results from last 7 days   Lab Units 01/15/24  1544 01/12/24  0408   GRAM STAIN RESULT  Rare Mononuclear Cells  No organisms seen No No Polys or Bacteria seen       Imaging Studies:   I have personally reviewed pertinent imaging study reports and images in PACS.    EKG, Pathology, and Other Studies:   I have personally reviewed pertinent reports.

## 2024-01-18 NOTE — PLAN OF CARE
Problem: Prexisting or High Potential for Compromised Skin Integrity  Goal: Skin integrity is maintained or improved  Description: INTERVENTIONS:  - Identify patients at risk for skin breakdown  - Assess and monitor skin integrity  - Assess and monitor nutrition and hydration status  - Monitor labs   - Assess for incontinence   - Turn and reposition patient  - Assist with mobility/ambulation  - Relieve pressure over bony prominences  - Avoid friction and shearing  - Provide appropriate hygiene as needed including keeping skin clean and dry  - Evaluate need for skin moisturizer/barrier cream  - Collaborate with interdisciplinary team   - Patient/family teaching  - Consider wound care consult   Outcome: Progressing     Problem: Nutrition/Hydration-ADULT  Goal: Nutrient/Hydration intake appropriate for improving, restoring or maintaining nutritional needs  Description: Monitor and assess patient's nutrition/hydration status for malnutrition. Collaborate with interdisciplinary team and initiate plan and interventions as ordered.  Monitor patient's weight and dietary intake as ordered or per policy. Utilize nutrition screening tool and intervene as necessary. Determine patient's food preferences and provide high-protein, high-caloric foods as appropriate.     INTERVENTIONS:  - Monitor oral intake, urinary output, labs, and treatment plans  - Assess nutrition and hydration status and recommend course of action  - Evaluate amount of meals eaten  - Assist patient with eating if necessary   - Allow adequate time for meals  - Recommend/ encourage appropriate diets, oral nutritional supplements, and vitamin/mineral supplements  - Order, calculate, and assess calorie counts as needed  - Recommend, monitor, and adjust tube feedings and TPN/PPN based on assessed needs  - Assess need for intravenous fluids  - Provide specific nutrition/hydration education as appropriate  - Include patient/family/caregiver in decisions related to  nutrition  Outcome: Progressing     Problem: PAIN - ADULT  Goal: Verbalizes/displays adequate comfort level or baseline comfort level  Description: Interventions:  - Encourage patient to monitor pain and request assistance  - Assess pain using appropriate pain scale  - Administer analgesics based on type and severity of pain and evaluate response  - Implement non-pharmacological measures as appropriate and evaluate response  - Consider cultural and social influences on pain and pain management  - Notify physician/advanced practitioner if interventions unsuccessful or patient reports new pain  Outcome: Progressing     Problem: INFECTION - ADULT  Goal: Absence or prevention of progression during hospitalization  Description: INTERVENTIONS:  - Assess and monitor for signs and symptoms of infection  - Monitor lab/diagnostic results  - Monitor all insertion sites, i.e. indwelling lines, tubes, and drains  - Monitor endotracheal if appropriate and nasal secretions for changes in amount and color  - [place]fall appropriate cooling/warming therapies per order  - Administer medications as ordered  - Instruct and encourage patient and family to use good hand hygiene technique  - Identify and instruct in appropriate isolation precautions for identified infection/condition  Outcome: Progressing  Goal: Absence of fever/infection during neutropenic period  Description: INTERVENTIONS:  - Monitor WBC    Outcome: Progressing     Problem: SAFETY ADULT  Goal: Patient will remain free of falls  Description: INTERVENTIONS:  - Educate patient/family on patient safety including physical limitations  - Instruct patient to call for assistance with activity   - Consult OT/PT to assist with strengthening/mobility   - Keep Call bell within reach  - Keep bed low and locked with side rails adjusted as appropriate  - Keep care items and personal belongings within reach  - Initiate and maintain comfort rounds  - Make Fall Risk Sign visible to  staff  - Offer Toileting every 2 Hours, in advance of need  - Initiate/Maintain bed alarm  - Obtain necessary fall risk management equipment: non skid footwear  - Apply yellow socks and bracelet for high fall risk patients  - Consider moving patient to room near nurses station  Outcome: Progressing  Goal: Maintain or return to baseline ADL function  Description: INTERVENTIONS:  -  Assess patient's ability to carry out ADLs; assess patient's baseline for ADL function and identify physical deficits which impact ability to perform ADLs (bathing, care of mouth/teeth, toileting, grooming, dressing, etc.)  - Assess/evaluate cause of self-care deficits   - Assess range of motion  - Assess patient's mobility; develop plan if impaired  - Assess patient's need for assistive devices and provide as appropriate  - Encourage maximum independence but intervene and supervise when necessary  - Involve family in performance of ADLs  - Assess for home care needs following discharge   - Consider OT consult to assist with ADL evaluation and planning for discharge  - Provide patient education as appropriate  Outcome: Progressing  Goal: Maintains/Returns to pre admission functional level  Description: INTERVENTIONS:  - Perform AM-PAC 6 Click Basic Mobility/ Daily Activity assessment daily.  - Set and communicate daily mobility goal to care team and patient/family/caregiver.   - Collaborate with rehabilitation services on mobility goals if consulted  - Perform Range of Motion 3 times a day.  - Reposition patient every 2 hours.  - Dangle patient 3 times a day  - Stand patient 3 times a day  - Ambulate patient 3 times a day  - Out of bed to chair 3 times a day   - Out of bed for meals 3 times a day  - Out of bed for toileting  - Record patient progress and toleration of activity level   Outcome: Progressing     Problem: DISCHARGE PLANNING  Goal: Discharge to home or other facility with appropriate resources  Description: INTERVENTIONS:  -  Identify barriers to discharge w/patient and caregiver  - Arrange for needed discharge resources and transportation as appropriate  - Identify discharge learning needs (meds, wound care, etc.)  - Arrange for interpretive services to assist at discharge as needed  - Refer to Case Management Department for coordinating discharge planning if the patient needs post-hospital services based on physician/advanced practitioner order or complex needs related to functional status, cognitive ability, or social support system  Outcome: Progressing     Problem: Knowledge Deficit  Goal: Patient/family/caregiver demonstrates understanding of disease process, treatment plan, medications, and discharge instructions  Description: Complete learning assessment and assess knowledge base.  Interventions:  - Provide teaching at level of understanding  - Provide teaching via preferred learning methods  Outcome: Progressing     Problem: NEUROSENSORY - ADULT  Goal: Achieves stable or improved neurological status  Description: INTERVENTIONS  - Monitor and report changes in neurological status  - Monitor vital signs such as temperature, blood pressure, glucose, and any other labs ordered   - Initiate measures to prevent increased intracranial pressure  - Monitor for seizure activity and implement precautions if appropriate      Outcome: Progressing  Goal: Remains free of injury related to seizures activity  Description: INTERVENTIONS  - Maintain airway, patient safety  and administer oxygen as ordered  - Monitor patient for seizure activity, document and report duration and description of seizure to physician/advanced practitioner  - If seizure occurs,  ensure patient safety during seizure  - Reorient patient post seizure  - Seizure pads on all 4 side rails  - Instruct patient/family to notify RN of any seizure activity including if an aura is experienced  - Instruct patient/family to call for assistance with activity based on nursing  assessment  - Administer anti-seizure medications if ordered    Outcome: Progressing  Goal: Achieves maximal functionality and self care  Description: INTERVENTIONS  - Monitor swallowing and airway patency with patient fatigue and changes in neurological status  - Encourage and assist patient to increase activity and self care.   - Encourage visually impaired, hearing impaired and aphasic patients to use assistive/communication devices  Outcome: Progressing     Problem: CARDIOVASCULAR - ADULT  Goal: Maintains optimal cardiac output and hemodynamic stability  Description: INTERVENTIONS:  - Monitor I/O, vital signs and rhythm  - Monitor for S/S and trends of decreased cardiac output  - Administer and titrate ordered vasoactive medications to optimize hemodynamic stability  - Assess quality of pulses, skin color and temperature  - Assess for signs of decreased coronary artery perfusion  - Instruct patient to report change in severity of symptoms  Outcome: Progressing  Goal: Absence of cardiac dysrhythmias or at baseline rhythm  Description: INTERVENTIONS:  - Continuous cardiac monitoring, vital signs, obtain 12 lead EKG if ordered  - Administer antiarrhythmic and heart rate control medications as ordered  - Monitor electrolytes and administer replacement therapy as ordered  Outcome: Progressing     Problem: RESPIRATORY - ADULT  Goal: Achieves optimal ventilation and oxygenation  Description: INTERVENTIONS:  - Assess for changes in respiratory status  - Assess for changes in mentation and behavior  - Position to facilitate oxygenation and minimize respiratory effort  - Oxygen administered by appropriate delivery if ordered  - Initiate smoking cessation education as indicated  - Encourage broncho-pulmonary hygiene including cough, deep breathe, Incentive Spirometry  - Assess the need for suctioning and aspirate as needed  - Assess and instruct to report SOB or any respiratory difficulty  - Respiratory Therapy support as  indicated  Outcome: Progressing     Problem: GASTROINTESTINAL - ADULT  Goal: Maintains or returns to baseline bowel function  Description: INTERVENTIONS:  - Assess bowel function  - Encourage oral fluids to ensure adequate hydration  - Administer IV fluids if ordered to ensure adequate hydration  - Administer ordered medications as needed  - Encourage mobilization and activity  - Consider nutritional services referral to assist patient with adequate nutrition and appropriate food choices  Outcome: Progressing  Goal: Maintains adequate nutritional intake  Description: INTERVENTIONS:  - Monitor percentage of each meal consumed  - Identify factors contributing to decreased intake, treat as appropriate  - Assist with meals as needed  - Monitor I&O, weight, and lab values if indicated  - Obtain nutrition services referral as needed  Outcome: Progressing     Problem: GENITOURINARY - ADULT  Goal: Maintains or returns to baseline urinary function  Description: INTERVENTIONS:  - Assess urinary function  - Encourage oral fluids to ensure adequate hydration if ordered  - Administer IV fluids as ordered to ensure adequate hydration  - Administer ordered medications as needed  - Offer frequent toileting  - Follow urinary retention protocol if ordered  Outcome: Progressing  Goal: Absence of urinary retention  Description: INTERVENTIONS:  - Assess patient's ability to void and empty bladder  - Monitor I/O  - Bladder scan as needed  - Discuss with physician/AP medications to alleviate retention as needed  - Discuss catheterization for long term situations as appropriate  Outcome: Progressing     Problem: METABOLIC, FLUID AND ELECTROLYTES - ADULT  Goal: Electrolytes maintained within normal limits  Description: INTERVENTIONS:  - Monitor labs and assess patient for signs and symptoms of electrolyte imbalances  - Administer electrolyte replacement as ordered  - Monitor response to electrolyte replacements, including repeat lab results  as appropriate  - Instruct patient on fluid and nutrition as appropriate  Outcome: Progressing  Goal: Fluid balance maintained  Description: INTERVENTIONS:  - Monitor labs   - Monitor I/O and WT  - Instruct patient on fluid and nutrition as appropriate  - Assess for signs & symptoms of volume excess or deficit  Outcome: Progressing     Problem: SKIN/TISSUE INTEGRITY - ADULT  Goal: Skin Integrity remains intact(Skin Breakdown Prevention)  Description: Assess:  -Perform Flavio assessment every shift   -Clean and moisturize skin every day   -Inspect skin when repositioning, toileting, and assisting with ADLS  -Assess under medical devices such as ronny  every hour   -Assess extremities for adequate circulation and sensation     Bed Management:  -Have minimal linens on bed & keep smooth, unwrinkled  -Change linens as needed when moist or perspiring  -Avoid sitting or lying in one position for more than 2 hours while in bed  -Keep HOB at 45 degrees     Toileting:  -Offer bedside commode  -Assess for incontinence every hour  -Use incontinent care products after each incontinent episode such as moisture barrier cream     Activity:  -Mobilize patient 3 times a day  -Encourage activity and walks on unit  -Encourage or provide ROM exercises   -Turn and reposition patient every 2 Hours  -Use appropriate equipment to lift or move patient in bed  -Instruct/ Assist with weight shifting every hour when out of bed in chair  -Consider limitation of chair time 2 hour intervals    Skin Care:  -Avoid use of baby powder, tape, friction and shearing, hot water or constrictive clothing  -Relieve pressure over bony prominences using allevyn   -Do not massage red bony areas    Next Steps:  -Teach patient strategies to minimize risks such as weight shifting    -Consider consults to  interdisciplinary teams such as PT  Outcome: Progressing  Goal: Incision(s), wounds(s) or drain site(s) healing without S/S of infection  Description:  INTERVENTIONS  - Assess and document dressing, incision, wound bed, drain sites and surrounding tissue  - Provide patient and family education  - Perform skin care/dressing changes every day  Outcome: Progressing     Problem: HEMATOLOGIC - ADULT  Goal: Maintains hematologic stability  Description: INTERVENTIONS  - Assess for signs and symptoms of bleeding or hemorrhage  - Monitor labs  - Administer supportive blood products/factors as ordered and appropriate  Outcome: Progressing     Problem: MUSCULOSKELETAL - ADULT  Goal: Maintain or return mobility to safest level of function  Description: INTERVENTIONS:  - Assess patient's ability to carry out ADLs; assess patient's baseline for ADL function and identify physical deficits which impact ability to perform ADLs (bathing, care of mouth/teeth, toileting, grooming, dressing, etc.)  - Assess/evaluate cause of self-care deficits   - Assess range of motion  - Assess patient's mobility  - Assess patient's need for assistive devices and provide as appropriate  - Encourage maximum independence but intervene and supervise when necessary  - Involve family in performance of ADLs  - Assess for home care needs following discharge   - Consider OT consult to assist with ADL evaluation and planning for discharge  - Provide patient education as appropriate  Outcome: Progressing     Problem: COPING  Goal: Pt/Family able to verbalize concerns and demonstrate effective coping strategies  Description: INTERVENTIONS:  - Assist patient/family to identify coping skills, available support systems and cultural and spiritual values  - Provide emotional support, including active listening and acknowledgement of concerns of patient and caregivers  - Reduce environmental stimuli, as able  - Provide patient education  - Assess for spiritual pain/suffering and initiate spiritual care, including notification of Pastoral Care or natalia based community as needed  - Assess effectiveness of coping  strategies  Outcome: Progressing  Goal: Will report anxiety at manageable levels  Description: INTERVENTIONS:  - Administer medication as ordered  - Teach and encourage coping skills  - Provide emotional support  - Assess patient/family for anxiety and ability to cope  Outcome: Progressing     Problem: Potential for Falls  Goal: Patient will remain free of falls  Description: INTERVENTIONS:  - Educate patient/family on patient safety including physical limitations  - Instruct patient to call for assistance with activity   - Consult OT/PT to assist with strengthening/mobility   - Keep Call bell within reach  - Keep bed low and locked with side rails adjusted as appropriate  - Keep care items and personal belongings within reach  - Initiate and maintain comfort rounds  - Make Fall Risk Sign visible to staff  - Offer Toileting every 2 Hours, in advance of need  - Initiate/Maintain bed alarm  - Obtain necessary fall risk management equipment: non skid footwear  - Apply yellow socks and bracelet for high fall risk patients  - Consider moving patient to room near nurses station  Outcome: Progressing

## 2024-01-18 NOTE — PLAN OF CARE
Problem: Prexisting or High Potential for Compromised Skin Integrity  Goal: Skin integrity is maintained or improved  Description: INTERVENTIONS:  - Identify patients at risk for skin breakdown  - Assess and monitor skin integrity  - Assess and monitor nutrition and hydration status  - Monitor labs   - Assess for incontinence   - Turn and reposition patient  - Assist with mobility/ambulation  - Relieve pressure over bony prominences  - Avoid friction and shearing  - Provide appropriate hygiene as needed including keeping skin clean and dry  - Evaluate need for skin moisturizer/barrier cream  - Collaborate with interdisciplinary team   - Patient/family teaching  - Consider wound care consult   Outcome: Progressing     Problem: Nutrition/Hydration-ADULT  Goal: Nutrient/Hydration intake appropriate for improving, restoring or maintaining nutritional needs  Description: Monitor and assess patient's nutrition/hydration status for malnutrition. Collaborate with interdisciplinary team and initiate plan and interventions as ordered.  Monitor patient's weight and dietary intake as ordered or per policy. Utilize nutrition screening tool and intervene as necessary. Determine patient's food preferences and provide high-protein, high-caloric foods as appropriate.     INTERVENTIONS:  - Monitor oral intake, urinary output, labs, and treatment plans  - Assess nutrition and hydration status and recommend course of action  - Evaluate amount of meals eaten  - Assist patient with eating if necessary   - Allow adequate time for meals  - Recommend/ encourage appropriate diets, oral nutritional supplements, and vitamin/mineral supplements  - Order, calculate, and assess calorie counts as needed  - Recommend, monitor, and adjust tube feedings and TPN/PPN based on assessed needs  - Assess need for intravenous fluids  - Provide specific nutrition/hydration education as appropriate  - Include patient/family/caregiver in decisions related to  nutrition  Outcome: Progressing     Problem: PAIN - ADULT  Goal: Verbalizes/displays adequate comfort level or baseline comfort level  Description: Interventions:  - Encourage patient to monitor pain and request assistance  - Assess pain using appropriate pain scale  - Administer analgesics based on type and severity of pain and evaluate response  - Implement non-pharmacological measures as appropriate and evaluate response  - Consider cultural and social influences on pain and pain management  - Notify physician/advanced practitioner if interventions unsuccessful or patient reports new pain  Outcome: Progressing     Problem: INFECTION - ADULT  Goal: Absence or prevention of progression during hospitalization  Description: INTERVENTIONS:  - Assess and monitor for signs and symptoms of infection  - Monitor lab/diagnostic results  - Monitor all insertion sites, i.e. indwelling lines, tubes, and drains  - Monitor endotracheal if appropriate and nasal secretions for changes in amount and color  - [place] appropriate cooling/warming therapies per order  - Administer medications as ordered  - Instruct and encourage patient and family to use good hand hygiene technique  - Identify and instruct in appropriate isolation precautions for identified infection/condition  Outcome: Progressing  Goal: Absence of fever/infection during neutropenic period  Description: INTERVENTIONS:  - Monitor WBC    Outcome: Progressing     Problem: SAFETY ADULT  Goal: Patient will remain free of falls  Description: INTERVENTIONS:  - Educate patient/family on patient safety including physical limitations  - Instruct patient to call for assistance with activity   - Consult OT/PT to assist with strengthening/mobility   - Keep Call bell within reach  - Keep bed low and locked with side rails adjusted as appropriate  - Keep care items and personal belongings within reach  - Initiate and maintain comfort rounds  - Make Fall Risk Sign visible to  staff  - Offer Toileting every 2 Hours, in advance of need  - Initiate/Maintain bed alarm  - Obtain necessary fall risk management equipment: non skid sock  - Apply yellow socks and bracelet for high fall risk patients  - Consider moving patient to room near nurses station  Outcome: Progressing  Goal: Maintain or return to baseline ADL function  Description: INTERVENTIONS:  -  Assess patient's ability to carry out ADLs; assess patient's baseline for ADL function and identify physical deficits which impact ability to perform ADLs (bathing, care of mouth/teeth, toileting, grooming, dressing, etc.)  - Assess/evaluate cause of self-care deficits   - Assess range of motion  - Assess patient's mobility; develop plan if impaired  - Assess patient's need for assistive devices and provide as appropriate  - Encourage maximum independence but intervene and supervise when necessary  - Involve family in performance of ADLs  - Assess for home care needs following discharge   - Consider OT consult to assist with ADL evaluation and planning for discharge  - Provide patient education as appropriate  Outcome: Progressing  Goal: Maintains/Returns to pre admission functional level  Description: INTERVENTIONS:  - Perform AM-PAC 6 Click Basic Mobility/ Daily Activity assessment daily.  - Set and communicate daily mobility goal to care team and patient/family/caregiver.   - Collaborate with rehabilitation services on mobility goals if consulted  - Perform Range of Motion 3 times a day.  - Reposition patient every 2 hours.  - Dangle patient 3 times a day  - Stand patient 3 times a day  - Ambulate patient 3 times a day  - Out of bed to chair 3 times a day   - Out of bed for meals 3 times a day  - Out of bed for toileting  - Record patient progress and toleration of activity level   Outcome: Progressing     Problem: DISCHARGE PLANNING  Goal: Discharge to home or other facility with appropriate resources  Description: INTERVENTIONS:  -  Identify barriers to discharge w/patient and caregiver  - Arrange for needed discharge resources and transportation as appropriate  - Identify discharge learning needs (meds, wound care, etc.)  - Arrange for interpretive services to assist at discharge as needed  - Refer to Case Management Department for coordinating discharge planning if the patient needs post-hospital services based on physician/advanced practitioner order or complex needs related to functional status, cognitive ability, or social support system  Outcome: Progressing     Problem: Knowledge Deficit  Goal: Patient/family/caregiver demonstrates understanding of disease process, treatment plan, medications, and discharge instructions  Description: Complete learning assessment and assess knowledge base.  Interventions:  - Provide teaching at level of understanding  - Provide teaching via preferred learning methods  Outcome: Progressing     Problem: NEUROSENSORY - ADULT  Goal: Achieves stable or improved neurological status  Description: INTERVENTIONS  - Monitor and report changes in neurological status  - Monitor vital signs such as temperature, blood pressure, glucose, and any other labs ordered   - Initiate measures to prevent increased intracranial pressure  - Monitor for seizure activity and implement precautions if appropriate      Outcome: Progressing  Goal: Remains free of injury related to seizures activity  Description: INTERVENTIONS  - Maintain airway, patient safety  and administer oxygen as ordered  - Monitor patient for seizure activity, document and report duration and description of seizure to physician/advanced practitioner  - If seizure occurs,  ensure patient safety during seizure  - Reorient patient post seizure  - Seizure pads on all 4 side rails  - Instruct patient/family to notify RN of any seizure activity including if an aura is experienced  - Instruct patient/family to call for assistance with activity based on nursing  assessment  - Administer anti-seizure medications if ordered    Outcome: Progressing  Goal: Achieves maximal functionality and self care  Description: INTERVENTIONS  - Monitor swallowing and airway patency with patient fatigue and changes in neurological status  - Encourage and assist patient to increase activity and self care.   - Encourage visually impaired, hearing impaired and aphasic patients to use assistive/communication devices  Outcome: Progressing     Problem: CARDIOVASCULAR - ADULT  Goal: Maintains optimal cardiac output and hemodynamic stability  Description: INTERVENTIONS:  - Monitor I/O, vital signs and rhythm  - Monitor for S/S and trends of decreased cardiac output  - Administer and titrate ordered vasoactive medications to optimize hemodynamic stability  - Assess quality of pulses, skin color and temperature  - Assess for signs of decreased coronary artery perfusion  - Instruct patient to report change in severity of symptoms  Outcome: Progressing  Goal: Absence of cardiac dysrhythmias or at baseline rhythm  Description: INTERVENTIONS:  - Continuous cardiac monitoring, vital signs, obtain 12 lead EKG if ordered  - Administer antiarrhythmic and heart rate control medications as ordered  - Monitor electrolytes and administer replacement therapy as ordered  Outcome: Progressing

## 2024-01-18 NOTE — PROGRESS NOTES
[facility]  Progress Note  Name: Carla Hunt I  MRN: [id number]  Unit/Bed#: [facility] 722-01 I Date of Admission: 1/10/2024   Date of Service: 1/18/2024 I Hospital Day: 8    Assessment/Plan   * Encephalopathy acute  Assessment & Plan  Patient transferred from ICU to stepdown to  Patient had lumbar puncture done culture negative  Negative HSV PCR  Follow-up flow cytometry, cytology, paraneoplastic panel  Melatonin and Seroquel started      Seizure disorder (HCC)  Assessment & Plan  S/p Temporal lobectomy in 2007  Continue home Lamictal and Topamax    Acute respiratory failure with hypoxia (HCC)  Assessment & Plan  Patient weaned down to mid flow 12 L  Wean as tolerated  Procalcitonin 0.6-0.3-0.15  Ceftriaxone finished 1/15  Patient diagnosed with severe COVID please see management below    COVID  Assessment & Plan  Supplemental oxygen as needed -severe pathway  Actermra 1/9  CRP trending down  0.1 mgkg through 1/22  Remdesivir through 1/21  Empxaparin BID    Dysphagia  Assessment & Plan  Speech following    Teto marginal zone B-cell lymphoma (HCC)  Assessment & Plan  Outpatient f/u  maintenance rituxan hycela until 5/2024 plan     Stage 3b chronic kidney disease (CKD) ([facility])  Assessment & Plan  Lab Results   Component Value Date    EGFR 63 01/16/2024    EGFR 68 01/16/2024    EGFR 71 01/15/2024    CREATININE 0.99 01/16/2024    CREATININE 0.93 01/16/2024    CREATININE 0.90 01/15/2024     Monitor BMP    Anxiety state  Assessment & Plan  Home effexor                VTE Pharmacologic Prophylaxis: VTE Score: 11 Moderate Risk (Score 3-4) - Pharmacological DVT Prophylaxis Ordered: enoxaparin (Lovenox).    Mobility:   Basic Mobility Inpatient Raw Score: 13  JH-HLM Goal: 4: Move to chair/commode  JH-HLM Achieved: 4: Move to chair/commode  HLM Goal achieved. Continue to encourage appropriate mobility.    Patient Centered Rounds: I performed bedside rounds with nursing staff today.    Discussions with Specialists or Other Care Team Provider: Reviewed ID notes    Education and Discussions with Family / Patient: Updated  () at bedside.    Total Time Spent on Date of Encounter in care of patient: 45 mins. This time was spent on one or more of the following: performing physical exam; counseling and coordination of care; obtaining or reviewing history; documenting in the medical record; reviewing/ordering tests, medications or procedures; communicating with other healthcare professionals and discussing with patient's family/caregivers.    Current Length of Stay: 8 day(s)  Current Patient Status: Inpatient   Certification Statement: The patient will continue to require additional inpatient hospital stay due to acute encephalopathy, acute respiratory failure  Discharge Plan: Anticipate discharge in >72 hrs to pending clinical improvement    Code Status: Level 1 - Full Code    Subjective:   This is a very pleasant 58 female was seen evaluated today at bedside.  Patient happy that she is out of the ICU on on the regular medical floor today.  Patient is happy that she is making progress.  She has no further complaints at this time.    Objective:     Vitals:   Temp (24hrs), Av.6 °F (37 °C), Min:98.1 °F (36.7 °C), Max:99.3 °F (37.4 °C)    Temp:  [98.1 °F (36.7 °C)-99.3 °F (37.4 °C)] 98.1 °F (36.7 °C)  HR:  [] 101  Resp:  [15-25] 18  BP: (118-142)/(66-83) 141/83  SpO2:  [88 %-95 %] 88 %  Body mass index is 25.97 kg/m².     Input and Output Summary (last 24 hours):     Intake/Output Summary (Last 24 hours) at 2024 0903  Last data filed at 2024 1812  Gross per 24 hour   Intake 1630 ml   Output 1025 ml   Net 605 ml       Physical Exam:   Physical Exam  Vitals reviewed.   Constitutional:       General: She is not in acute distress.     Appearance: Normal appearance. She is ill-appearing.   HENT:      Head: Normocephalic and atraumatic.      Right Ear: External ear  normal.      Left Ear: External ear normal.      Nose: Nose normal.   Eyes:      Extraocular Movements: Extraocular movements intact.      Conjunctiva/sclera: Conjunctivae normal.   Cardiovascular:      Rate and Rhythm: Normal rate and regular rhythm.      Pulses: Normal pulses.      Heart sounds: Normal heart sounds.   Pulmonary:      Effort: Pulmonary effort is normal.      Comments: Decreased breath sounds  Abdominal:      General: Abdomen is flat. Bowel sounds are normal.      Palpations: Abdomen is soft.   Musculoskeletal:         General: No deformity or signs of injury.      Cervical back: Normal range of motion.   Skin:     General: Skin is warm and dry.   Neurological:      General: No focal deficit present.      Mental Status: She is alert and oriented to person, place, and time. Mental status is at baseline.   Psychiatric:         Mood and Affect: Mood normal.         Behavior: Behavior normal.          Additional Data:     Labs:  Results from last 7 days   Lab Units 01/17/24  0454 01/16/24  0445 01/15/24  0502 01/13/24  0506 01/12/24  0558   WBC Thousand/uL 9.88   < > 9.57   < > 3.48*   HEMOGLOBIN g/dL 11.1*   < > 11.1*   < > 11.4*   HEMATOCRIT % 33.9*   < > 33.2*   < > 34.2*   PLATELETS Thousands/uL 284   < > 286   < > 293   BANDS PCT %  --   --  6   < >  --    NEUTROS PCT %  --   --   --   --  88*   LYMPHS PCT %  --   --   --   --  3*   LYMPHO PCT %  --   --  0*   < >  --    MONOS PCT %  --   --   --   --  7   MONO PCT %  --   --  2*   < >  --    EOS PCT %  --   --  1   < > 0    < > = values in this interval not displayed.     Results from last 7 days   Lab Units 01/17/24  0454 01/15/24  0502 01/14/24  0521   SODIUM mmol/L 145   < > 147   POTASSIUM mmol/L 3.8   < > 3.2*   CHLORIDE mmol/L 112*   < > 119*   CO2 mmol/L 25   < > 21   BUN mg/dL 18   < > 15   CREATININE mg/dL 1.04   < > 0.99   ANION GAP mmol/L 8   < > 7   CALCIUM mg/dL 8.7   < > 7.8*   ALBUMIN g/dL  --   --  2.9*   TOTAL BILIRUBIN mg/dL  --    --  0.55   ALK PHOS U/L  --   --  102   ALT U/L  --   --  85*   AST U/L  --   --  81*   GLUCOSE RANDOM mg/dL 99   < > 112    < > = values in this interval not displayed.     Results from last 7 days   Lab Units 01/11/24  1519   INR  1.32*     Results from last 7 days   Lab Units 01/18/24  0612 01/17/24  2035 01/17/24  1629 01/17/24  1203 01/17/24  0803 01/16/24  2041 01/16/24  1758 01/16/24  1114 01/16/24  0005 01/15/24  1648 01/15/24  1220 01/15/24  0855   POC GLUCOSE mg/dl 136 244* 151* 194* 150* 234* 172* 235* 145* 238* 269* 180*         Results from last 7 days   Lab Units 01/16/24  0445 01/14/24  0521   PROCALCITONIN ng/ml 0.14 0.32*       Lines/Drains:  Invasive Devices       Peripheral Intravenous Line  Duration             Peripheral IV 01/15/24 Left;Upper Arm 3 days    Peripheral IV 01/16/24 Right Antecubital 1 day                          Imaging: Reviewed radiology reports from this admission including: chest xray    Recent Cultures (last 7 days):   Results from last 7 days   Lab Units 01/15/24  1544 01/12/24  0408   GRAM STAIN RESULT  Rare Mononuclear Cells  No organisms seen No No Polys or Bacteria seen       Last 24 Hours Medication List:   Current Facility-Administered Medications   Medication Dose Route Frequency Provider Last Rate    acetaminophen  650 mg Oral Q6H PRN Laura Rodriguez, DO      bisacodyl  10 mg Rectal Daily PRN Laura Rodriguez, DO      chlorhexidine  15 mL Mouth/Throat Q12H [facility] Laura Rodriguez, DO      dexamethasone  0.1 mg/kg Intravenous Q12H Laura Rodriguez, DO      enoxaparin  40 mg Subcutaneous Daily Laura Rodriguez, DO      insulin lispro  2-12 Units Subcutaneous TID With Meals Laura Rodriguez, DO      insulin lispro  2-12 Units Subcutaneous HS Laura Rodriguez, DO      lamoTRIgine  150 mg Oral BID Laura Rodriguez, DO      melatonin  6 mg Oral HS Laura Rodriguez, DO      ondansetron  4 mg Intravenous Q6H PRN Laura Rodriguez, DO      polyethylene glycol  17 g Oral Daily  Laura Rodriguez DO      QUEtiapine  25 mg Oral HS Laura Rodriguez DO      remdesivir  100 mg Intravenous Q24H Laura Rodriguez DO 0 mg (01/15/24 1715)    senna-docusate sodium  2 tablet Oral BID Laura Rodriguez DO      topiramate  100 mg Oral BID Laura Rodriguez DO      venlafaxine  25 mg Oral TID With Meals Laura Rodriguez DO          Today, Patient Was Seen By: Jerardo Johnson MD    **Please Note: This note may have been constructed using a voice recognition system.**

## 2024-01-18 NOTE — UTILIZATION REVIEW
Continued Stay Review    Date: 01/18/24                          Current Patient Class: IP  Current Level of Care: Level 2 Stepdown/HOT    HPI:58 y.o. female initially admitted on 1/10.     Assessment/Plan: Weaned to 12 L/min on mid-flow nasal cannula. On exam, decreased breath sounds. Plan: continue current meds, Follow-up flow cytometry, cytology, paraneoplastic panel; continue to wean mid-flow nasal cannula as able. Continue IV remdesivir, enoxaparin BID, IV steroids, Trend labs, replete electrolytes as needed.    Vital Signs:   Date/Time Temp Pulse Resp BP MAP (mmHg) SpO2 Calculated FIO2 (%) - Nasal Cannula O2 Flow Rate (L/min) Nasal Cannula O2 Flow Rate (L/min) O2 Device   01/18/24 0800 -- -- -- -- -- 88 % Abnormal  68 -- 12 L/min Mid flow nasal cannula   01/18/24 0742 -- -- -- -- -- 92 % -- 15 L/min -- Mid flow nasal cannula   01/18/24 07:17:04 98.1 °F (36.7 °C) 101 18 141/83 102 93 % -- -- -- --   01/18/24 0130 -- -- -- -- -- -- -- 12 L/min -- Mid flow nasal cannula   01/17/24 23:32:59 98.3 °F (36.8 °C) 80 -- 142/80 101 89 % Abnormal  -- -- -- --   01/17/24 23:32:05 98.3 °F (36.8 °C) 98 -- 142/80 101 93 % -- -- -- --   01/17/24 2000 -- -- -- -- -- -- 68 -- 12 L/min Mid flow nasal cannula   01/17/24 1930 -- -- -- -- -- -- 68 -- 12 L/min Mid flow nasal cannula   01/17/24 1907 -- -- -- -- -- -- 68 -- 12 L/min Mid flow nasal cannula   01/17/24 16:35:11 98.2 °F (36.8 °C) 109 Abnormal  15 142/78 99 94 % -- -- -- Mid flow nasal cannula   01/17/24 1500 -- 102 20 124/71 95 95 % 80 -- 15 L/min Mid flow nasal cannula   01/17/24 1400 -- 94 23 Abnormal  120/66 86 95 % -- -- -- --   01/17/24 1315 -- 122 Abnormal  25 Abnormal  127/68 86 93 % -- -- -- --   01/17/24 1243 -- -- -- -- -- -- 80 -- 15 L/min Mid flow nasal cannula   01/17/24 1240 99.3 °F (37.4 °C) 116 Abnormal  24 Abnormal  133/69 90 95 % -- -- -- --       Pertinent Labs/Diagnostic Results:     Results from last 7 days   Lab Units 01/18/24  0949 01/17/24  0454  01/16/24  0445 01/15/24  0502 01/14/24  0521 01/13/24  0506 01/12/24  0558   WBC Thousand/uL 10.77* 9.88 10.19* 9.57 9.04 7.92 3.48*   HEMOGLOBIN g/dL 11.7 11.1* 10.6* 11.1* 10.8* 11.6 11.4*   HEMATOCRIT % 35.9 33.9* 32.2* 33.2* 33.8* 34.7* 34.2*   PLATELETS Thousands/uL 269 284 319 286 271 302 293   NEUTROS ABS Thousands/µL  --   --   --   --   --   --  3.06   BANDS PCT % 1  --   --  6  --  8  --          Results from last 7 days   Lab Units 01/17/24  0454 01/16/24  1412 01/16/24  0445 01/15/24  0502 01/14/24  0521 01/13/24  0506   SODIUM mmol/L 145 144 149* 146 147 148*   POTASSIUM mmol/L 3.8 3.5 3.3* 3.4* 3.2* 3.0*   CHLORIDE mmol/L 112* 114* 116* 116* 119* 114*   CO2 mmol/L 25 26 24 21 21 23   ANION GAP mmol/L 8 4 9 9 7 11   BUN mg/dL 18 16 15 15 15 18   CREATININE mg/dL 1.04 0.99 0.93 0.90 0.99 0.96   EGFR ml/min/1.73sq m 59 63 68 71 63 65   CALCIUM mg/dL 8.7 9.3 8.8 8.0* 7.8* 7.5*   MAGNESIUM mg/dL 2.3  --  2.4 1.9 2.0 2.0   PHOSPHORUS mg/dL 2.6*  --  2.4* 2.0* 1.9* 2.5*     Results from last 7 days   Lab Units 01/14/24  0521   AST U/L 81*   ALT U/L 85*   ALK PHOS U/L 102   TOTAL PROTEIN g/dL 4.6*   ALBUMIN g/dL 2.9*   TOTAL BILIRUBIN mg/dL 0.55     Results from last 7 days   Lab Units 01/18/24  1047 01/18/24  0612 01/17/24  2035 01/17/24  1629 01/17/24  1203 01/17/24  0803 01/16/24  2041 01/16/24  1758 01/16/24  1114 01/16/24  0005 01/15/24  1648 01/15/24  1220   POC GLUCOSE mg/dl 247* 136 244* 151* 194* 150* 234* 172* 235* 145* 238* 269*     Results from last 7 days   Lab Units 01/17/24  0454 01/16/24  1412 01/16/24  0445 01/15/24  0502 01/14/24  0521 01/13/24  0506 01/12/24  0558   GLUCOSE RANDOM mg/dL 99 103 126 136 112 120 154*     Results from last 7 days   Lab Units 01/16/24  1412   OSMOLALITY, SERUM mmol/*      Results from last 7 days   Lab Units 01/12/24  0558 01/11/24  1519   PROTIME seconds  --  16.3*   INR   --  1.32*   PTT seconds 32  --      Results from last 7 days   Lab Units  01/12/24  0558   TSH 3RD GENERATON uIU/mL 4.570*     Results from last 7 days   Lab Units 01/16/24  0445 01/14/24  0521   PROCALCITONIN ng/ml 0.14 0.32*      Results from last 7 days   Lab Units 01/14/24  0521   CRP mg/L 21.9*         Results from last 7 days   Lab Units 01/16/24  1412   OSMOLALITY, SERUM mmol/*   OSMO UR mmol/     Results from last 7 days   Lab Units 01/16/24  1412   CLARITY UA  Clear   COLOR UA  Light Yellow   SPEC GRAV UA  1.010   PH UA  7.0   GLUCOSE UA mg/dl Negative   KETONES UA mg/dl Negative   BLOOD UA  Negative   PROTEIN UA mg/dl Trace*   NITRITE UA  Negative   BILIRUBIN UA  Negative   UROBILINOGEN UA (BE) mg/dl <2.0   LEUKOCYTES UA  Negative   WBC UA /hpf 2-4*   RBC UA /hpf 1-2   BACTERIA UA /hpf None Seen   EPITHELIAL CELLS WET PREP /hpf Occasional   SODIUM UR  105      Results from last 7 days   Lab Units 01/15/24  1544 01/12/24  0408   GRAM STAIN RESULT  Rare Mononuclear Cells  No organisms seen No No Polys or Bacteria seen     Results from last 7 days   Lab Units 01/15/24  1544   TOTAL COUNTED  8     Results from last 7 days   Lab Units 01/15/24  1546 01/15/24  1544 01/12/24  0408   APPEARANCE CSF   --  clear, colorless light pink   TUBE NUM CSF   --  4 2   WBC CSF /uL  --  1 1   XANTHOCHROMIA   --  No No   MONOCYTES % (CSF) %  --  88  --    GLUCOSE CSF mg/dL  --  113*  --    PROTEIN CSF mg/dL  --  50*  --    RBC CSF uL  --  25*  --    CSF CULTURE  No growth  --  No growth           Medications:   Scheduled Medications:  chlorhexidine, 15 mL, Mouth/Throat, Q12H SARTHAK  dexamethasone, 0.1 mg/kg, Intravenous, Q12H  enoxaparin, 40 mg, Subcutaneous, Daily  insulin lispro, 2-12 Units, Subcutaneous, TID With Meals  insulin lispro, 2-12 Units, Subcutaneous, HS  lamoTRIgine, 150 mg, Oral, BID  melatonin, 6 mg, Oral, HS  polyethylene glycol, 17 g, Oral, Daily  QUEtiapine, 25 mg, Oral, HS  remdesivir, 100 mg, Intravenous, Q24H  senna-docusate sodium, 2 tablet, Oral, BID  topiramate,  100 mg, Oral, BID  venlafaxine, 25 mg, Oral, TID With Meals    Continuous IV Infusions: none    PRN Meds:  acetaminophen, 650 mg, Oral, Q6H PRN  bisacodyl, 10 mg, Rectal, Daily PRN  multi-electrolyte, 500 mL bolus, Intravenous; 1/17 x1  ondansetron, 4 mg, Intravenous, Q6H PRN        Discharge Plan: TBD    Network Utilization Review Department  ATTENTION: Please call with any questions or concerns to [phone] and carefully listen to the prompts so that you are directed to the right person. All voicemails are confidential.   For Discharge needs, contact Care Management DC Support Team at [phone] opt. 2  Send all requests for admission clinical reviews, approved or denied determinations and any other requests to dedicated fax number below belonging to the [place] where the patient is receiving treatment. List of dedicated fax numbers for the Facilities:  FACILITY NAME UR FAX NUMBER   ADMISSION DENIALS (Administrative/Medical Necessity) [phone]   DISCHARGE SUPPORT TEAM (NETWORK) [phone]   PARENT CHILD HEALTH (Maternity/NICU/Pediatrics) [phone]   [facility] [phone]   [facility] [phone]   Critical access hospital [phone]   [facility] [phone]   [facility] [phone]   [facility] [phone]   [facility] [phone]   [facility] [phone]   [facility] [phone]   [facility] [phone]   [facility] [phone]

## 2024-01-19 PROBLEM — G40.219 LOCALIZATION-RELATED SYMPTOMATIC EPILEPSY AND EPILEPTIC SYNDROMES WITH COMPLEX PARTIAL SEIZURES, INTRACTABLE, WITHOUT STATUS EPILEPTICUS (HCC): Status: RESOLVED | Noted: 2020-04-24 | Resolved: 2024-01-19

## 2024-01-19 PROBLEM — R29.90 STROKE-LIKE SYMPTOMS: Status: RESOLVED | Noted: 2024-01-08 | Resolved: 2024-01-19

## 2024-01-19 LAB
ALBUMIN SERPL BCP-MCNC: 3.5 G/DL (ref 3.5–5)
ALBUMIN SERPL BCP-MCNC: 3.5 G/DL (ref 3.5–5)
ALP SERPL-CCNC: 109 U/L (ref 34–104)
ALP SERPL-CCNC: 111 U/L (ref 34–104)
ALT SERPL W P-5'-P-CCNC: 54 U/L (ref 7–52)
ALT SERPL W P-5'-P-CCNC: 56 U/L (ref 7–52)
AMPAR1 IGG CSF QL IF: NEGATIVE
AMPAR2 IGG CSF QL IF: NEGATIVE
AMPHIPHYSIN AB CSF QL IF: NEGATIVE
ANION GAP SERPL CALCULATED.3IONS-SCNC: 12 MMOL/L
ANION GAP SERPL CALCULATED.3IONS-SCNC: 8 MMOL/L
AQP4 H2O CHANNEL AB CSF QL IF: NEGATIVE
AST SERPL W P-5'-P-CCNC: 33 U/L (ref 13–39)
AST SERPL W P-5'-P-CCNC: 42 U/L (ref 13–39)
BILIRUB SERPL-MCNC: 0.57 MG/DL (ref 0.2–1)
BILIRUB SERPL-MCNC: 0.58 MG/DL (ref 0.2–1)
BUN SERPL-MCNC: 31 MG/DL (ref 5–25)
BUN SERPL-MCNC: 32 MG/DL (ref 5–25)
CALCIUM SERPL-MCNC: 9.4 MG/DL (ref 8.4–10.2)
CALCIUM SERPL-MCNC: 9.9 MG/DL (ref 8.4–10.2)
CASPR2 AB CSF QL CBA IFA: NEGATIVE
CHLORIDE SERPL-SCNC: 106 MMOL/L (ref 96–108)
CHLORIDE SERPL-SCNC: 108 MMOL/L (ref 96–108)
CO2 SERPL-SCNC: 26 MMOL/L (ref 21–32)
CO2 SERPL-SCNC: 26 MMOL/L (ref 21–32)
CREAT SERPL-MCNC: 1.05 MG/DL (ref 0.6–1.3)
CREAT SERPL-MCNC: 1.05 MG/DL (ref 0.6–1.3)
CV2 AB CSF QL IF: NEGATIVE
DPPX IGG CSF QL IF: NEGATIVE
ERYTHROCYTE [DISTWIDTH] IN BLOOD BY AUTOMATED COUNT: 16.2 % (ref 11.6–15.1)
GABABR AB CSF QL CBA IFA: NEGATIVE
GAD65 AB CSF IA-SCNC: NEGATIVE NMOL/L
GFR SERPL CREATININE-BSD FRML MDRD: 59 ML/MIN/1.73SQ M
GFR SERPL CREATININE-BSD FRML MDRD: 59 ML/MIN/1.73SQ M
GLIAL NUC TYPE 1 AB CSF QL IF: NEGATIVE
GLUCOSE SERPL-MCNC: 127 MG/DL (ref 65–140)
GLUCOSE SERPL-MCNC: 128 MG/DL (ref 65–140)
GLUCOSE SERPL-MCNC: 144 MG/DL (ref 65–140)
GLUCOSE SERPL-MCNC: 147 MG/DL (ref 65–140)
GLUCOSE SERPL-MCNC: 181 MG/DL (ref 65–140)
GLUCOSE SERPL-MCNC: 229 MG/DL (ref 65–140)
HCT VFR BLD AUTO: 39.8 % (ref 34.8–46.1)
HGB BLD-MCNC: 12.4 G/DL (ref 11.5–15.4)
HU1 AB CSF QL IF: NEGATIVE
HU2 AB CSF QL IF: NEGATIVE
HU3 AB CSF QL IF: NEGATIVE
IGLON5 IGG CSF QL CBA IFA: NEGATIVE
IMP & REVIEW OF LAB RESULTS: NEGATIVE
IP3R 1 IGG CSF QL IF: NEGATIVE
LGI1 AB CSF QL CBA IFA: NEGATIVE
MA+TA AB CSF QL IF: NEGATIVE
MCH RBC QN AUTO: 29.1 PG (ref 26.8–34.3)
MCHC RBC AUTO-ENTMCNC: 31.2 G/DL (ref 31.4–37.4)
MCV RBC AUTO: 93 FL (ref 82–98)
MGLUR1 IGG CSF QL CBA IFA: NEGATIVE
NMDAR IGG CSF QL IF: NEGATIVE
NRBC BLD AUTO-RTO: 0 /100 WBCS
PCA-2 AB CSF QL IF: NEGATIVE
PCA-TR AB CSF QL CBA IFA: NEGATIVE
PCA-TR AB CSF QL IF: NEGATIVE
PLATELET # BLD AUTO: 271 THOUSANDS/UL (ref 149–390)
PMV BLD AUTO: 11.5 FL (ref 8.9–12.7)
POTASSIUM SERPL-SCNC: 4 MMOL/L (ref 3.5–5.3)
POTASSIUM SERPL-SCNC: 4.6 MMOL/L (ref 3.5–5.3)
PROT SERPL-MCNC: 5.8 G/DL (ref 6.4–8.4)
PROT SERPL-MCNC: 5.8 G/DL (ref 6.4–8.4)
PURKINJE CELLS AB CSF QL IF: NEGATIVE
RBC # BLD AUTO: 4.26 MILLION/UL (ref 3.81–5.12)
SARS-COV-2 AG UPPER RESP QL IA: NEGATIVE
SARS-COV-2 AG UPPER RESP QL IA: NORMAL
SODIUM SERPL-SCNC: 142 MMOL/L (ref 135–147)
SODIUM SERPL-SCNC: 144 MMOL/L (ref 135–147)
WBC # BLD AUTO: 9.33 THOUSAND/UL (ref 4.31–10.16)
ZIC4 ANTIBODY, CSF: NEGATIVE

## 2024-01-19 PROCEDURE — 94760 N-INVAS EAR/PLS OXIMETRY 1: CPT | Performed by: SOCIAL WORKER

## 2024-01-19 PROCEDURE — 94760 N-INVAS EAR/PLS OXIMETRY 1: CPT

## 2024-01-19 PROCEDURE — 94664 DEMO&/EVAL PT USE INHALER: CPT

## 2024-01-19 PROCEDURE — 85027 COMPLETE CBC AUTOMATED: CPT | Performed by: FAMILY MEDICINE

## 2024-01-19 PROCEDURE — 80053 COMPREHEN METABOLIC PANEL: CPT | Performed by: FAMILY MEDICINE

## 2024-01-19 PROCEDURE — 82948 REAGENT STRIP/BLOOD GLUCOSE: CPT

## 2024-01-19 PROCEDURE — 94762 N-INVAS EAR/PLS OXIMTRY CONT: CPT

## 2024-01-19 PROCEDURE — 99232 SBSQ HOSP IP/OBS MODERATE 35: CPT | Performed by: INTERNAL MEDICINE

## 2024-01-19 PROCEDURE — 99232 SBSQ HOSP IP/OBS MODERATE 35: CPT | Performed by: FAMILY MEDICINE

## 2024-01-19 RX ADMIN — LAMOTRIGINE 150 MG: 100 TABLET ORAL at 08:53

## 2024-01-19 RX ADMIN — VENLAFAXINE 25 MG: 25 TABLET ORAL at 12:06

## 2024-01-19 RX ADMIN — VENLAFAXINE 25 MG: 25 TABLET ORAL at 08:53

## 2024-01-19 RX ADMIN — ENOXAPARIN SODIUM 40 MG: 40 INJECTION SUBCUTANEOUS at 08:54

## 2024-01-19 RX ADMIN — VENLAFAXINE 25 MG: 25 TABLET ORAL at 17:01

## 2024-01-19 RX ADMIN — MELATONIN 6 MG: at 21:18

## 2024-01-19 RX ADMIN — TOPIRAMATE 100 MG: 100 TABLET, FILM COATED ORAL at 16:54

## 2024-01-19 RX ADMIN — INSULIN LISPRO 4 UNITS: 100 INJECTION, SOLUTION INTRAVENOUS; SUBCUTANEOUS at 21:18

## 2024-01-19 RX ADMIN — TOPIRAMATE 100 MG: 100 TABLET, FILM COATED ORAL at 08:54

## 2024-01-19 RX ADMIN — QUETIAPINE 25 MG: 25 TABLET ORAL at 21:18

## 2024-01-19 RX ADMIN — CHLORHEXIDINE GLUCONATE 15 ML: 1.2 SOLUTION ORAL at 21:18

## 2024-01-19 RX ADMIN — REMDESIVIR 100 MG: 100 INJECTION, POWDER, LYOPHILIZED, FOR SOLUTION INTRAVENOUS at 16:55

## 2024-01-19 RX ADMIN — CHLORHEXIDINE GLUCONATE 15 ML: 1.2 SOLUTION ORAL at 08:53

## 2024-01-19 RX ADMIN — LAMOTRIGINE 150 MG: 100 TABLET ORAL at 21:21

## 2024-01-19 RX ADMIN — DEXAMETHASONE SODIUM PHOSPHATE 7.7 MG: 10 INJECTION, SOLUTION INTRAMUSCULAR; INTRAVENOUS at 15:16

## 2024-01-19 RX ADMIN — DEXAMETHASONE SODIUM PHOSPHATE 7.7 MG: 10 INJECTION, SOLUTION INTRAMUSCULAR; INTRAVENOUS at 03:27

## 2024-01-19 RX ADMIN — SENNOSIDES, DOCUSATE SODIUM 2 TABLET: 8.6; 5 TABLET ORAL at 16:54

## 2024-01-19 NOTE — ASSESSMENT & PLAN NOTE
Patient transferred from ICU to stepdown to  Patient had lumbar puncture done culture negative  Negative HSV PCR  Follow-up flow cytometry, cytology, paraneoplastic panel  Melatonin and Seroquel started  Significantly improved

## 2024-01-19 NOTE — PROGRESS NOTES
[facility]  Progress Note  Name: Carla Hunt I  MRN: [id number]  Unit/Bed#: [facility] 722-01 I Date of Admission: 1/10/2024   Date of Service: 1/19/2024 I Hospital Day: 9    Assessment/Plan   * Encephalopathy acute  Assessment & Plan  Patient transferred from ICU to stepdown to  Patient had lumbar puncture done culture negative  Negative HSV PCR  Follow-up flow cytometry, cytology, paraneoplastic panel  Melatonin and Seroquel started  Significantly improved      Seizure disorder (HCC)  Assessment & Plan  S/p Temporal lobectomy in 2007  Continue home Lamictal and Topamax    Acute respiratory failure with hypoxia (HCC)  Assessment & Plan  Patient weaned down to mid flow 11 L  Wean as tolerated  Procalcitonin 0.6-0.3-0.15  Ceftriaxone finished 1/15  Patient diagnosed with severe COVID please see management below    COVID  Assessment & Plan  Supplemental oxygen as needed -severe pathway  Actermra 1/9  CRP trending down  0.1 mgkg through 1/22  Remdesivir through 1/21  Empxaparin BID  Due to patient's immunocompromise state patient will need to have 2 COVID antigen negative results prior to  Isolation     Dysphagia  Assessment & Plan  Speech following    Teto marginal zone B-cell lymphoma (HCC)  Assessment & Plan  Outpatient f/u  maintenance rituxan hycela until 5/2024 plan     Stage 3b chronic kidney disease (CKD) ([facility])  Assessment & Plan  Lab Results   Component Value Date    EGFR 59 01/19/2024    EGFR 59 01/18/2024    EGFR 59 01/17/2024    CREATININE 1.05 01/19/2024    CREATININE 1.05 01/18/2024    CREATININE 1.04 01/17/2024     Monitor BMP    Anxiety state  Assessment & Plan  Home effexor                VTE Pharmacologic Prophylaxis: VTE Score: 11 Moderate Risk (Score 3-4) - Pharmacological DVT Prophylaxis Ordered: enoxaparin (Lovenox).    Mobility:   Basic Mobility Inpatient Raw Score: 19  JH-HLM Goal: 6: Walk 10 steps or more  JH-HLM Achieved: 6: Walk 10 steps or more  HLM  Goal achieved. Continue to encourage appropriate mobility.    Patient Centered Rounds: I performed bedside rounds with nursing staff today.   Discussions with Specialists or Other Care Team Provider: Reviewed notes from infectious disease    Education and Discussions with Family / Patient: Patient declined call to .     Total Time Spent on Date of Encounter in care of patient: 40 mins. This time was spent on one or more of the following: performing physical exam; counseling and coordination of care; obtaining or reviewing history; documenting in the medical record; reviewing/ordering tests, medications or procedures; communicating with other healthcare professionals and discussing with patient's family/caregivers.    Current Length of Stay: 9 day(s)  Current Patient Status: Inpatient   Certification Statement: The patient will continue to require additional inpatient hospital stay due to requiring mid flow oxygen supplementation for acute respiratory failure  Discharge Plan: Anticipate discharge in >72 hrs to pending clinical improvement    Code Status: Level 1 - Full Code    Subjective:   Is a very pleasant 58-year-old female who was seen evaluated today at bedside.  Patient had no acute concerns at this time.    Objective:     Vitals:   Temp (24hrs), Av °F (36.7 °C), Min:98 °F (36.7 °C), Max:98 °F (36.7 °C)    Temp:  [98 °F (36.7 °C)] 98 °F (36.7 °C)  HR:  [] 105  Resp:  [16-18] 16  BP: (115-127)/(75-79) 115/75  SpO2:  [84 %-96 %] 93 %  Body mass index is 25.97 kg/m².     Input and Output Summary (last 24 hours):   No intake or output data in the 24 hours ending 24 3247    Physical Exam:   Physical Exam  Vitals reviewed.   Constitutional:       General: She is not in acute distress.     Appearance: Normal appearance. She is ill-appearing.   HENT:      Head: Normocephalic and atraumatic.      Right Ear: External ear normal.      Left Ear: External ear normal.      Nose: Nose normal.    Eyes:      Extraocular Movements: Extraocular movements intact.      Conjunctiva/sclera: Conjunctivae normal.   Cardiovascular:      Rate and Rhythm: Normal rate and regular rhythm.      Pulses: Normal pulses.      Heart sounds: Normal heart sounds.   Pulmonary:      Effort: Pulmonary effort is normal.      Comments: Decreased breath sounds  Nasal cannula in place  Abdominal:      General: Abdomen is flat. Bowel sounds are normal.      Palpations: Abdomen is soft.   Musculoskeletal:         General: No deformity or signs of injury.      Cervical back: Normal range of motion.   Skin:     General: Skin is warm and dry.   Neurological:      General: No focal deficit present.      Mental Status: She is alert. Mental status is at baseline.   Psychiatric:         Mood and Affect: Mood normal.         Behavior: Behavior normal.          Additional Data:     Labs:  Results from last 7 days   Lab Units 01/19/24  0509 01/18/24  0949   WBC Thousand/uL 9.33 10.77*   HEMOGLOBIN g/dL 12.4 11.7   HEMATOCRIT % 39.8 35.9   PLATELETS Thousands/uL 271 269   BANDS PCT %  --  1   LYMPHO PCT %  --  0*   MONO PCT %  --  3*   EOS PCT %  --  0     Results from last 7 days   Lab Units 01/19/24  0509   SODIUM mmol/L 142   POTASSIUM mmol/L 4.0   CHLORIDE mmol/L 108   CO2 mmol/L 26   BUN mg/dL 31*   CREATININE mg/dL 1.05   ANION GAP mmol/L 8   CALCIUM mg/dL 9.4   ALBUMIN g/dL 3.5   TOTAL BILIRUBIN mg/dL 0.58   ALK PHOS U/L 109*   ALT U/L 54*   AST U/L 33   GLUCOSE RANDOM mg/dL 127     Results from last 7 days   Lab Units 01/18/24  1322   INR  1.21*     Results from last 7 days   Lab Units 01/19/24  1548 01/19/24  1058 01/19/24  0648 01/18/24  2059 01/18/24  1625 01/18/24  1047 01/18/24  0612 01/17/24  2035 01/17/24  1629 01/17/24  1203 01/17/24  0803 01/16/24  2041   POC GLUCOSE mg/dl 144* 147* 128 255* 134 247* 136 244* 151* 194* 150* 234*         Results from last 7 days   Lab Units 01/16/24  0445 01/14/24  0521   PROCALCITONIN ng/ml 0.14  0.32*       Lines/Drains:  Invasive Devices       Peripheral Intravenous Line  Duration             Peripheral IV 01/15/24 Left;Upper Arm 4 days    Peripheral IV 01/16/24 Right Antecubital 3 days                          Imaging: No pertinent imaging reviewed.    Recent Cultures (last 7 days):   Results from last 7 days   Lab Units 01/15/24  1544   GRAM STAIN RESULT  Rare Mononuclear Cells  No organisms seen       Last 24 Hours Medication List:   Current Facility-Administered Medications   Medication Dose Route Frequency Provider Last Rate    acetaminophen  650 mg Oral Q6H PRN Laura Rodriguez, DO      bisacodyl  10 mg Rectal Daily PRN Laura Rodriguez, DO      chlorhexidine  15 mL Mouth/Throat Q12H SARTHAK Laura Rodriguez, DO      dexamethasone  0.1 mg/kg Intravenous Q12H Laura Rodriguez, DO      enoxaparin  40 mg Subcutaneous Daily Laura Rodriguez, DO      insulin lispro  2-12 Units Subcutaneous TID With Meals Laura Rodriguez, DO      insulin lispro  2-12 Units Subcutaneous HS Laura Rodriguez, DO      lamoTRIgine  150 mg Oral BID Laura Rodriguez, DO      melatonin  6 mg Oral HS Laura Rodriguez, DO      ondansetron  4 mg Intravenous Q6H PRN Laura Rodriguez, DO      polyethylene glycol  17 g Oral Daily Laura Rodriguez, DO      QUEtiapine  25 mg Oral HS Laura Rodriguez, DO      remdesivir  100 mg Intravenous Q24H Laura Rodriguez, DO 0 mg (01/15/24 1715)    senna-docusate sodium  2 tablet Oral BID Laura Rodriguez, DO      topiramate  100 mg Oral BID Laura Rodriguez, DO      venlafaxine  25 mg Oral TID With Meals Laura Rodriguez, DO          Today, Patient Was Seen By: Jerardo Johnson MD    **Please Note: This note may have been constructed using a voice recognition system.**

## 2024-01-19 NOTE — UTILIZATION REVIEW
Continued Stay Review    Date: 1-19-24                           Current Patient Class: inpatient  Current Level of Care:  med surg    HPI:58 y.o. female initially admitted on 1-  Covid positive with acute encephalopathy.    Assessment/Plan:     Patient requires 11 L O2 midflow nc to maintain sats at least 91%. Continue sq lovenox  iv remdesivir and iv decadron  Continue neuro checks q4 hr.  GCS now 15.    Vital Signs:     Patient Vitals for the past 24 hrs:   BP Temp Pulse Resp SpO2   01/19/24 0820 -- -- 98 16 94 %   01/19/24 0730 116/75 98 °F (36.7 °C) 104 -- 91 %   01/19/24 0157 -- -- -- -- 96 %   01/18/24 2250 127/78 -- (!) 106 18 (!) 84 %   01/18/24 2235 127/78 -- (!) 111 18 (!) 89 %   01/18/24 1945 -- -- -- -- 93 %   01/18/24 1813 126/79 -- (!) 112 -- (!) 88 %   01/18/24 1525 126/79 98.7 °F (37.1 °C) (!) 109 18 95 %      Respiratory Data   (Last 48 hours)    01/17 1635 01/17 1907 01/17 1930 01/17 2000 01/18 0130 01/18 0742 01/18 0800 01/18 1945 01/19 0157 01/19 0820   Non-Invasive Ventilation Mode   MFNC (M...  MFNC (M... MFNC (M...  MFNC (M... MFNC (M... MFNC (M...   Flow (lpm)   12  12 15  11 11 11   O2 Device Mid migue... Mid migue... Mid migue... Mid migue... Mid migue... Mid migue... Mid migue... Mid migue... Mid migue... Mid migue...   O2 Interface Device   MFNC pr...  MFNC pr... MFNC pr...  MFNC pr... MFNC pr... MFNC pr...   O2 Therapy     Oxygen ... Oxygen ...  Oxygen ... Oxygen ... Oxygen ...   O2 Flow Rate (L/min) (L/min)     12 15       Nasal Cannula O2 Flow Rate (L/min) (L/min)  12 12 12   12 11 11 11        Pertinent Labs/Diagnostic Results:       Results from last 7 days   Lab Units 01/19/24  0509 01/18/24  0949 01/17/24  0454 01/16/24  0445 01/15/24  0502 01/14/24  0521 01/13/24  0506   WBC Thousand/uL 9.33 10.77* 9.88 10.19* 9.57   < > 7.92   HEMOGLOBIN g/dL 12.4 11.7 11.1* 10.6* 11.1*   < > 11.6   HEMATOCRIT % 39.8 35.9 33.9* 32.2* 33.2*   < > 34.7*   PLATELETS Thousands/uL 271 269 284 319 286   < >  302   BANDS PCT %  --  1  --   --  6  --  8    < > = values in this interval not displayed.         Results from last 7 days   Lab Units 01/19/24  0509 01/18/24  1322 01/17/24  0454 01/16/24  1412 01/16/24  0445 01/15/24  0502 01/14/24  0521 01/13/24  0506   SODIUM mmol/L 142 144 145 144 149* 146 147 148*   POTASSIUM mmol/L 4.0 4.6 3.8 3.5 3.3* 3.4* 3.2* 3.0*   CHLORIDE mmol/L 108 106 112* 114* 116* 116* 119* 114*   CO2 mmol/L 26 26 25 26 24 21 21 23   ANION GAP mmol/L 8 12 8 4 9 9 7 11   BUN mg/dL 31* 32* 18 16 15 15 15 18   CREATININE mg/dL 1.05 1.05 1.04 0.99 0.93 0.90 0.99 0.96   EGFR ml/min/1.73sq m 59 59 59 63 68 71 63 65   CALCIUM mg/dL 9.4 9.9 8.7 9.3 8.8 8.0* 7.8* 7.5*   MAGNESIUM mg/dL  --   --  2.3  --  2.4 1.9 2.0 2.0   PHOSPHORUS mg/dL  --   --  2.6*  --  2.4* 2.0* 1.9* 2.5*     Results from last 7 days   Lab Units 01/19/24  0509 01/18/24  1322 01/14/24  0521   AST U/L 33 42* 81*   ALT U/L 54* 56* 85*   ALK PHOS U/L 109* 111* 102   TOTAL PROTEIN g/dL 5.8* 5.8* 4.6*   ALBUMIN g/dL 3.5 3.5 2.9*   TOTAL BILIRUBIN mg/dL 0.58 0.57 0.55     Results from last 7 days   Lab Units 01/19/24  1058 01/19/24  0648 01/18/24 2059 01/18/24  1625 01/18/24  1047 01/18/24  0612 01/17/24  2035 01/17/24  1629 01/17/24  1203 01/17/24  0803 01/16/24  2041 01/16/24  1758   POC GLUCOSE mg/dl 147* 128 255* 134 247* 136 244* 151* 194* 150* 234* 172*     Results from last 7 days   Lab Units 01/19/24  0509 01/18/24  1322 01/17/24  0454 01/16/24  1412 01/16/24  0445 01/15/24  0502 01/14/24  0521 01/13/24  0506   GLUCOSE RANDOM mg/dL 127 181* 99 103 126 136 112 120     Results from last 7 days   Lab Units 01/16/24  1412   OSMOLALITY, SERUM mmol/*       Results from last 7 days   Lab Units 01/18/24  1322   PROTIME seconds 15.1*   INR  1.21*         Results from last 7 days   Lab Units 01/16/24  0445 01/14/24  0521   PROCALCITONIN ng/ml 0.14 0.32*     Results from last 7 days   Lab Units 01/14/24  0521   CRP mg/L 21.9*          Results from last 7 days   Lab Units 01/16/24  1412   OSMOLALITY, SERUM mmol/*   OSMO UR mmol/     Results from last 7 days   Lab Units 01/16/24  1412   CLARITY UA  Clear   COLOR UA  Light Yellow   SPEC GRAV UA  1.010   PH UA  7.0   GLUCOSE UA mg/dl Negative   KETONES UA mg/dl Negative   BLOOD UA  Negative   PROTEIN UA mg/dl Trace*   NITRITE UA  Negative   BILIRUBIN UA  Negative   UROBILINOGEN UA (BE) mg/dl <2.0   LEUKOCYTES UA  Negative   WBC UA /hpf 2-4*   RBC UA /hpf 1-2   BACTERIA UA /hpf None Seen   EPITHELIAL CELLS WET PREP /hpf Occasional   SODIUM UR  105         Results from last 7 days   Lab Units 01/15/24  1544   GRAM STAIN RESULT  Rare Mononuclear Cells  No organisms seen     Results from last 7 days   Lab Units 01/15/24  1544   TOTAL COUNTED  8     Results from last 7 days   Lab Units 01/15/24  1546 01/15/24  1544   APPEARANCE CSF   --  clear, colorless   TUBE NUM CSF   --  4   WBC CSF /uL  --  1   XANTHOCHROMIA   --  No   MONOCYTES % (CSF) %  --  88   GLUCOSE CSF mg/dL  --  113*   PROTEIN CSF mg/dL  --  50*   RBC CSF uL  --  25*   CSF CULTURE  No growth  --      Scheduled Medications:    chlorhexidine, 15 mL, Mouth/Throat, Q12H SARTHAK  dexamethasone, 0.1 mg/kg, Intravenous, Q12H  enoxaparin, 40 mg, Subcutaneous, Daily  insulin lispro, 2-12 Units, Subcutaneous, TID With Meals  insulin lispro, 2-12 Units, Subcutaneous, HS  lamoTRIgine, 150 mg, Oral, BID  melatonin, 6 mg, Oral, HS  polyethylene glycol, 17 g, Oral, Daily  QUEtiapine, 25 mg, Oral, HS  remdesivir, 100 mg, Intravenous, Q24H  senna-docusate sodium, 2 tablet, Oral, BID  topiramate, 100 mg, Oral, BID  venlafaxine, 25 mg, Oral, TID With Meals      Continuous IV Infusions:     PRN Meds:  acetaminophen, 650 mg, Oral, Q6H PRN  bisacodyl, 10 mg, Rectal, Daily PRN  ondansetron, 4 mg, Intravenous, Q6H PRN        Discharge Plan: to be determined     Network Utilization Review Department  ATTENTION: Please call with any questions or concerns  to [phone] and carefully listen to the prompts so that you are directed to the right person. All voicemails are confidential.   For Discharge needs, contact Care Management DC Support Team at [phone] opt. 2  Send all requests for admission clinical reviews, approved or denied determinations and any other requests to dedicated fax number below belonging to the [place] where the patient is receiving treatment. List of dedicated fax numbers for the Facilities:  FACILITY NAME UR FAX NUMBER   ADMISSION DENIALS (Administrative/Medical Necessity) [phone]   DISCHARGE SUPPORT TEAM (NETWORK) [phone]   PARENT CHILD HEALTH (Maternity/NICU/Pediatrics) [phone]   [facility] [phone]   [facility] [phone]   [facility] [phone]   [facility] [phone]   [facility] [phone]   [facility] [phone]   [facility] [phone]   [facility] [phone]   [facility] [phone]   [facility] [phone]   [facility] [phone]

## 2024-01-19 NOTE — PLAN OF CARE
Problem: Prexisting or High Potential for Compromised Skin Integrity  Goal: Skin integrity is maintained or improved  Description: INTERVENTIONS:  - Identify patients at risk for skin breakdown  - Assess and monitor skin integrity  - Assess and monitor nutrition and hydration status  - Monitor labs   - Assess for incontinence   - Turn and reposition patient  - Assist with mobility/ambulation  - Relieve pressure over bony prominences  - Avoid friction and shearing  - Provide appropriate hygiene as needed including keeping skin clean and dry  - Evaluate need for skin moisturizer/barrier cream  - Collaborate with interdisciplinary team   - Patient/family teaching  - Consider wound care consult   Outcome: Progressing     Problem: Nutrition/Hydration-ADULT  Goal: Nutrient/Hydration intake appropriate for improving, restoring or maintaining nutritional needs  Description: Monitor and assess patient's nutrition/hydration status for malnutrition. Collaborate with interdisciplinary team and initiate plan and interventions as ordered.  Monitor patient's weight and dietary intake as ordered or per policy. Utilize nutrition screening tool and intervene as necessary. Determine patient's food preferences and provide high-protein, high-caloric foods as appropriate.     INTERVENTIONS:  - Monitor oral intake, urinary output, labs, and treatment plans  - Assess nutrition and hydration status and recommend course of action  - Evaluate amount of meals eaten  - Assist patient with eating if necessary   - Allow adequate time for meals  - Recommend/ encourage appropriate diets, oral nutritional supplements, and vitamin/mineral supplements  - Order, calculate, and assess calorie counts as needed  - Recommend, monitor, and adjust tube feedings and TPN/PPN based on assessed needs  - Assess need for intravenous fluids  - Provide specific nutrition/hydration education as appropriate  - Include patient/family/caregiver in decisions related to  nutrition  Outcome: Progressing     Problem: PAIN - ADULT  Goal: Verbalizes/displays adequate comfort level or baseline comfort level  Description: Interventions:  - Encourage patient to monitor pain and request assistance  - Assess pain using appropriate pain scale  - Administer analgesics based on type and severity of pain and evaluate response  - Implement non-pharmacological measures as appropriate and evaluate response  - Consider cultural and social influences on pain and pain management  - Notify physician/advanced practitioner if interventions unsuccessful or patient reports new pain  Outcome: Progressing     Problem: INFECTION - ADULT  Goal: Absence or prevention of progression during hospitalization  Description: INTERVENTIONS:  - Assess and monitor for signs and symptoms of infection  - Monitor lab/diagnostic results  - Monitor all insertion sites, i.e. indwelling lines, tubes, and drains  - Monitor endotracheal if appropriate and nasal secretions for changes in amount and color  - Jewett appropriate cooling/warming therapies per order  - Administer medications as ordered  - Instruct and encourage patient and family to use good hand hygiene technique  - Identify and instruct in appropriate isolation precautions for identified infection/condition  Outcome: Progressing  Goal: Absence of fever/infection during neutropenic period  Description: INTERVENTIONS:  - Monitor WBC    Outcome: Progressing     Problem: SAFETY ADULT  Goal: Patient will remain free of falls  Description: INTERVENTIONS:  - Educate patient/family on patient safety including physical limitations  - Instruct patient to call for assistance with activity   - Consult OT/PT to assist with strengthening/mobility   - Keep Call bell within reach  - Keep bed low and locked with side rails adjusted as appropriate  - Keep care items and personal belongings within reach  - Initiate and maintain comfort rounds  - Make Fall Risk Sign visible to  staff  - Offer Toileting every 2 Hours, in advance of need  - Initiate/Maintain bed alarm  - Obtain necessary fall risk management equipment: non skid footwear  - Apply yellow socks and bracelet for high fall risk patients  - Consider moving patient to room near nurses station  Outcome: Progressing  Goal: Maintain or return to baseline ADL function  Description: INTERVENTIONS:  -  Assess patient's ability to carry out ADLs; assess patient's baseline for ADL function and identify physical deficits which impact ability to perform ADLs (bathing, care of mouth/teeth, toileting, grooming, dressing, etc.)  - Assess/evaluate cause of self-care deficits   - Assess range of motion  - Assess patient's mobility; develop plan if impaired  - Assess patient's need for assistive devices and provide as appropriate  - Encourage maximum independence but intervene and supervise when necessary  - Involve family in performance of ADLs  - Assess for home care needs following discharge   - Consider OT consult to assist with ADL evaluation and planning for discharge  - Provide patient education as appropriate  Outcome: Progressing  Goal: Maintains/Returns to pre admission functional level  Description: INTERVENTIONS:  - Perform AM-PAC 6 Click Basic Mobility/ Daily Activity assessment daily.  - Set and communicate daily mobility goal to care team and patient/family/caregiver.   - Collaborate with rehabilitation services on mobility goals if consulted  - Perform Range of Motion 3 times a day.  - Reposition patient every 2 hours.  - Dangle patient 3 times a day  - Stand patient 3 times a day  - Ambulate patient 3 times a day  - Out of bed to chair 3 times a day   - Out of bed for meals 3 times a day  - Out of bed for toileting  - Record patient progress and toleration of activity level   Outcome: Progressing     Problem: DISCHARGE PLANNING  Goal: Discharge to home or other facility with appropriate resources  Description: INTERVENTIONS:  -  Identify barriers to discharge w/patient and caregiver  - Arrange for needed discharge resources and transportation as appropriate  - Identify discharge learning needs (meds, wound care, etc.)  - Arrange for interpretive services to assist at discharge as needed  - Refer to Case Management Department for coordinating discharge planning if the patient needs post-hospital services based on physician/advanced practitioner order or complex needs related to functional status, cognitive ability, or social support system  Outcome: Progressing     Problem: Knowledge Deficit  Goal: Patient/family/caregiver demonstrates understanding of disease process, treatment plan, medications, and discharge instructions  Description: Complete learning assessment and assess knowledge base.  Interventions:  - Provide teaching at level of understanding  - Provide teaching via preferred learning methods  Outcome: Progressing     Problem: NEUROSENSORY - ADULT  Goal: Achieves stable or improved neurological status  Description: INTERVENTIONS  - Monitor and report changes in neurological status  - Monitor vital signs such as temperature, blood pressure, glucose, and any other labs ordered   - Initiate measures to prevent increased intracranial pressure  - Monitor for seizure activity and implement precautions if appropriate      Outcome: Progressing  Goal: Remains free of injury related to seizures activity  Description: INTERVENTIONS  - Maintain airway, patient safety  and administer oxygen as ordered  - Monitor patient for seizure activity, document and report duration and description of seizure to physician/advanced practitioner  - If seizure occurs,  ensure patient safety during seizure  - Reorient patient post seizure  - Seizure pads on all 4 side rails  - Instruct patient/family to notify RN of any seizure activity including if an aura is experienced  - Instruct patient/family to call for assistance with activity based on nursing  assessment  - Administer anti-seizure medications if ordered    Outcome: Progressing  Goal: Achieves maximal functionality and self care  Description: INTERVENTIONS  - Monitor swallowing and airway patency with patient fatigue and changes in neurological status  - Encourage and assist patient to increase activity and self care.   - Encourage visually impaired, hearing impaired and aphasic patients to use assistive/communication devices  Outcome: Progressing     Problem: CARDIOVASCULAR - ADULT  Goal: Maintains optimal cardiac output and hemodynamic stability  Description: INTERVENTIONS:  - Monitor I/O, vital signs and rhythm  - Monitor for S/S and trends of decreased cardiac output  - Administer and titrate ordered vasoactive medications to optimize hemodynamic stability  - Assess quality of pulses, skin color and temperature  - Assess for signs of decreased coronary artery perfusion  - Instruct patient to report change in severity of symptoms  Outcome: Progressing  Goal: Absence of cardiac dysrhythmias or at baseline rhythm  Description: INTERVENTIONS:  - Continuous cardiac monitoring, vital signs, obtain 12 lead EKG if ordered  - Administer antiarrhythmic and heart rate control medications as ordered  - Monitor electrolytes and administer replacement therapy as ordered  Outcome: Progressing     Problem: RESPIRATORY - ADULT  Goal: Achieves optimal ventilation and oxygenation  Description: INTERVENTIONS:  - Assess for changes in respiratory status  - Assess for changes in mentation and behavior  - Position to facilitate oxygenation and minimize respiratory effort  - Oxygen administered by appropriate delivery if ordered  - Initiate smoking cessation education as indicated  - Encourage broncho-pulmonary hygiene including cough, deep breathe, Incentive Spirometry  - Assess the need for suctioning and aspirate as needed  - Assess and instruct to report SOB or any respiratory difficulty  - Respiratory Therapy support as  indicated  Outcome: Progressing     Problem: GASTROINTESTINAL - ADULT  Goal: Maintains or returns to baseline bowel function  Description: INTERVENTIONS:  - Assess bowel function  - Encourage oral fluids to ensure adequate hydration  - Administer IV fluids if ordered to ensure adequate hydration  - Administer ordered medications as needed  - Encourage mobilization and activity  - Consider nutritional services referral to assist patient with adequate nutrition and appropriate food choices  Outcome: Progressing  Goal: Maintains adequate nutritional intake  Description: INTERVENTIONS:  - Monitor percentage of each meal consumed  - Identify factors contributing to decreased intake, treat as appropriate  - Assist with meals as needed  - Monitor I&O, weight, and lab values if indicated  - Obtain nutrition services referral as needed  Outcome: Progressing     Problem: GENITOURINARY - ADULT  Goal: Maintains or returns to baseline urinary function  Description: INTERVENTIONS:  - Assess urinary function  - Encourage oral fluids to ensure adequate hydration if ordered  - Administer IV fluids as ordered to ensure adequate hydration  - Administer ordered medications as needed  - Offer frequent toileting  - Follow urinary retention protocol if ordered  Outcome: Progressing  Goal: Absence of urinary retention  Description: INTERVENTIONS:  - Assess patient's ability to void and empty bladder  - Monitor I/O  - Bladder scan as needed  - Discuss with physician/AP medications to alleviate retention as needed  - Discuss catheterization for long term situations as appropriate  Outcome: Progressing     Problem: METABOLIC, FLUID AND ELECTROLYTES - ADULT  Goal: Electrolytes maintained within normal limits  Description: INTERVENTIONS:  - Monitor labs and assess patient for signs and symptoms of electrolyte imbalances  - Administer electrolyte replacement as ordered  - Monitor response to electrolyte replacements, including repeat lab results  as appropriate  - Instruct patient on fluid and nutrition as appropriate  Outcome: Progressing  Goal: Fluid balance maintained  Description: INTERVENTIONS:  - Monitor labs   - Monitor I/O and WT  - Instruct patient on fluid and nutrition as appropriate  - Assess for signs & symptoms of volume excess or deficit  Outcome: Progressing     Problem: SKIN/TISSUE INTEGRITY - ADULT  Goal: Skin Integrity remains intact(Skin Breakdown Prevention)  Description: Assess:  -Perform Flavio assessment every shift   -Clean and moisturize skin every day   -Inspect skin when repositioning, toileting, and assisting with ADLS  -Assess under medical devices such as ronny  every hour   -Assess extremities for adequate circulation and sensation     Bed Management:  -Have minimal linens on bed & keep smooth, unwrinkled  -Change linens as needed when moist or perspiring  -Avoid sitting or lying in one position for more than 2 hours while in bed  -Keep HOB at 45 degrees     Toileting:  -Offer bedside commode  -Assess for incontinence every hour  -Use incontinent care products after each incontinent episode such as moisture barrier cream     Activity:  -Mobilize patient 3 times a day  -Encourage activity and walks on unit  -Encourage or provide ROM exercises   -Turn and reposition patient every 2 Hours  -Use appropriate equipment to lift or move patient in bed  -Instruct/ Assist with weight shifting every hour when out of bed in chair  -Consider limitation of chair time 2 hour intervals    Skin Care:  -Avoid use of baby powder, tape, friction and shearing, hot water or constrictive clothing  -Relieve pressure over bony prominences using allevyn   -Do not massage red bony areas    Next Steps:  -Teach patient strategies to minimize risks such as weight shifting    -Consider consults to  interdisciplinary teams such as PT  Outcome: Progressing  Goal: Incision(s), wounds(s) or drain site(s) healing without S/S of infection  Description:  INTERVENTIONS  - Assess and document dressing, incision, wound bed, drain sites and surrounding tissue  - Provide patient and family education  - Perform skin care/dressing changes every day  Outcome: Progressing     Problem: HEMATOLOGIC - ADULT  Goal: Maintains hematologic stability  Description: INTERVENTIONS  - Assess for signs and symptoms of bleeding or hemorrhage  - Monitor labs  - Administer supportive blood products/factors as ordered and appropriate  Outcome: Progressing     Problem: MUSCULOSKELETAL - ADULT  Goal: Maintain or return mobility to safest level of function  Description: INTERVENTIONS:  - Assess patient's ability to carry out ADLs; assess patient's baseline for ADL function and identify physical deficits which impact ability to perform ADLs (bathing, care of mouth/teeth, toileting, grooming, dressing, etc.)  - Assess/evaluate cause of self-care deficits   - Assess range of motion  - Assess patient's mobility  - Assess patient's need for assistive devices and provide as appropriate  - Encourage maximum independence but intervene and supervise when necessary  - Involve family in performance of ADLs  - Assess for home care needs following discharge   - Consider OT consult to assist with ADL evaluation and planning for discharge  - Provide patient education as appropriate  Outcome: Progressing     Problem: COPING  Goal: Pt/Family able to verbalize concerns and demonstrate effective coping strategies  Description: INTERVENTIONS:  - Assist patient/family to identify coping skills, available support systems and cultural and spiritual values  - Provide emotional support, including active listening and acknowledgement of concerns of patient and caregivers  - Reduce environmental stimuli, as able  - Provide patient education  - Assess for spiritual pain/suffering and initiate spiritual care, including notification of Pastoral Care or natalia based community as needed  - Assess effectiveness of coping  strategies  Outcome: Progressing  Goal: Will report anxiety at manageable levels  Description: INTERVENTIONS:  - Administer medication as ordered  - Teach and encourage coping skills  - Provide emotional support  - Assess patient/family for anxiety and ability to cope  Outcome: Progressing     Problem: Potential for Falls  Goal: Patient will remain free of falls  Description: INTERVENTIONS:  - Educate patient/family on patient safety including physical limitations  - Instruct patient to call for assistance with activity   - Consult OT/PT to assist with strengthening/mobility   - Keep Call bell within reach  - Keep bed low and locked with side rails adjusted as appropriate  - Keep care items and personal belongings within reach  - Initiate and maintain comfort rounds  - Make Fall Risk Sign visible to staff  - Offer Toileting every 2 Hours, in advance of need  - Initiate/Maintain bed alarm  - Obtain necessary fall risk management equipment: non skid footwear  - Apply yellow socks and bracelet for high fall risk patients  - Consider moving patient to room near nurses station  Outcome: Progressing

## 2024-01-19 NOTE — ASSESSMENT & PLAN NOTE
Patient weaned down to mid flow 11 L  Wean as tolerated  Procalcitonin 0.6-0.3-0.15  Ceftriaxone finished 1/15  Patient diagnosed with severe COVID please see management below

## 2024-01-19 NOTE — ASSESSMENT & PLAN NOTE
Lab Results   Component Value Date    EGFR 59 01/19/2024    EGFR 59 01/18/2024    EGFR 59 01/17/2024    CREATININE 1.05 01/19/2024    CREATININE 1.05 01/18/2024    CREATININE 1.04 01/17/2024     Monitor BMP

## 2024-01-19 NOTE — ASSESSMENT & PLAN NOTE
Supplemental oxygen as needed -severe pathway  Actermra 1/9  CRP trending down  0.1 mgkg through 1/22  Remdesivir through 1/21  Empxaparin BID  Due to patient's immunocompromise state patient will need to have 2 COVID antigen negative results prior to  Isolation

## 2024-01-19 NOTE — PROGRESS NOTES
Progress Note - Infectious Disease   Carla Hunt 58 y.o. female MRN: [id number]  Unit/Bed#: [facility] 722-01 Encounter: [id number]      Impression/Recommendations:  SIRS.  If patient does indeed had seizure, SIRS may be all secondary to it.   Consideration for COVID and bacterial pneumonia contributing to this.  Patient is overall clinically proved and she remains systemically nontoxic.  Temperature is down.  WBC normalized.  Admission blood cultures have no growth.  Patient completed antibiotic course.  She remains on treatment for COVID.  COVID treatment as in below.    Monitor temperature/WBC.     Encephalopathy.  Head CT and MRI did not show acute CVA or any mass lesion.  There is no seizure on EEG monitoring now but it is unclear whether patient may have a seizure during recent hospitalization at [facility] prior to transfer.  Clinical picture was never suggestive of bacterial meningitis but HSV encephalitis was possible.  However, patient had 2 lumbar punctures, both with no evidence of CSF inflammation and negative HSV PCR.  Patient had been on IV acyclovir but this was discontinued.  Mental status is back to normal.  No further anti-infective needed for this.  Monitor mental status.     Severe COVID, with acute hypoxic respiratory failure.  Patient is currently on remdesivir and dexamethasone and did receive 1 dose of Tocilizumab.  CXR with relatively mild opacities.  With elevated procalcitonin, there is concern of possible concurrent bacterial pneumonia.  Patient did receive initial rounds of COVID vaccination but not the new COVID vaccination available this past fall.  Procalcitonin decreasing.  Patient completed antibiotic course for bacterial pneumonia.  She remains on remdesivir and dexamethasone.  Completing course of remdesivir and dexamethasone.  Observe off further antibiotic.  Monitor respiratory symptoms and O2 saturation.     Acute hypoxic respiratory failure, likely multifactorial,  with COVID contributing.  Respiratory status improving, with O2 support slowly weaning.  O2 support per critical service.     CKD.  Creatinine improved from admission.  Antibiotic and antiviral dosages adjusted accordingly.  Monitor creatinine.     Seizure disorder, status post temporal lobe resection in .  There was no active seizure on EEG monitoring but it is unclear whether patient may have had seizure earlier.  Neurology follow-up.     B-cell lymphoma, on chemotherapy.  Patient was leukopenic admission but not neutropenic.  WBC is now in normal range.  Monitor WBC/ANC.     Discussed with patient in detail regarding the above plan.     Antibiotics:  Off antibiotic     Subjective:  Patient is comfortable.  She is awake and alert, with no further confusion.  No headache.  Mild and improving dyspnea.  Stable nonproductive cough.  She remains on mid flow supplement.  Temperature stays down.  No chills.  No diarrhea.        Objective:  Vitals:  Temp:  [98 °F (36.7 °C)] 98 °F (36.7 °C)  HR:  [] 105  Resp:  [16-18] 16  BP: (115-127)/(75-79) 115/75  SpO2:  [84 %-96 %] 93 %  Temp (24hrs), Av °F (36.7 °C), Min:98 °F (36.7 °C), Max:98 °F (36.7 °C)  Current: Temperature: 98 °F (36.7 °C)    Physical Exam:     General: Awake, alert, cooperative, no distress.   Neck:  Supple. No mass.  No lymphadenopathy.   Lungs: Expansion symmetric, basilar rhonchi, no rales, no wheezing, respirations unlabored.   Heart:  Regular rate and rhythm, S1 and S2 normal, no murmur.   Abdomen: Soft, nondistended, non-tender, bowel sounds active all four quadrants, no masses, no organomegaly.   Extremities: No edema. No erythema/warmth. No ulcer. Nontender to palpation.   Skin:  No rash.   Neuro: Moves all extremities.     Invasive Devices       Peripheral Intravenous Line  Duration             Peripheral IV 01/15/24 Left;Upper Arm 4 days    Peripheral IV 24 Right Antecubital 3 days                    Labs studies:   I have  personally reviewed pertinent labs.  Results from last 7 days   Lab Units 01/19/24  0509 01/18/24  1322 01/17/24  0454 01/15/24  0502 01/14/24  0521   POTASSIUM mmol/L 4.0 4.6 3.8   < > 3.2*   CHLORIDE mmol/L 108 106 112*   < > 119*   CO2 mmol/L 26 26 25   < > 21   BUN mg/dL 31* 32* 18   < > 15   CREATININE mg/dL 1.05 1.05 1.04   < > 0.99   EGFR ml/min/1.73sq m 59 59 59   < > 63   CALCIUM mg/dL 9.4 9.9 8.7   < > 7.8*   AST U/L 33 42*  --   --  81*   ALT U/L 54* 56*  --   --  85*   ALK PHOS U/L 109* 111*  --   --  102    < > = values in this interval not displayed.     Results from last 7 days   Lab Units 01/19/24  0509 01/18/24  0949 01/17/24  0454   WBC Thousand/uL 9.33 10.77* 9.88   HEMOGLOBIN g/dL 12.4 11.7 11.1*   PLATELETS Thousands/uL 271 269 284     Results from last 7 days   Lab Units 01/15/24  1544   GRAM STAIN RESULT  Rare Mononuclear Cells  No organisms seen       Imaging Studies:   I have personally reviewed pertinent imaging study reports and images in PACS.    EKG, Pathology, and Other Studies:   I have personally reviewed pertinent reports.

## 2024-01-20 LAB
ALBUMIN SERPL BCP-MCNC: 3.4 G/DL (ref 3.5–5)
ALP SERPL-CCNC: 99 U/L (ref 34–104)
ALT SERPL W P-5'-P-CCNC: 52 U/L (ref 7–52)
ANION GAP SERPL CALCULATED.3IONS-SCNC: 5 MMOL/L
AST SERPL W P-5'-P-CCNC: 35 U/L (ref 13–39)
BILIRUB SERPL-MCNC: 0.53 MG/DL (ref 0.2–1)
BUN SERPL-MCNC: 28 MG/DL (ref 5–25)
CALCIUM ALBUM COR SERPL-MCNC: 9.9 MG/DL (ref 8.3–10.1)
CALCIUM SERPL-MCNC: 9.4 MG/DL (ref 8.4–10.2)
CHLORIDE SERPL-SCNC: 106 MMOL/L (ref 96–108)
CO2 SERPL-SCNC: 29 MMOL/L (ref 21–32)
CREAT SERPL-MCNC: 0.91 MG/DL (ref 0.6–1.3)
ERYTHROCYTE [DISTWIDTH] IN BLOOD BY AUTOMATED COUNT: 16.4 % (ref 11.6–15.1)
GFR SERPL CREATININE-BSD FRML MDRD: 70 ML/MIN/1.73SQ M
GLUCOSE SERPL-MCNC: 103 MG/DL (ref 65–140)
GLUCOSE SERPL-MCNC: 115 MG/DL (ref 65–140)
GLUCOSE SERPL-MCNC: 136 MG/DL (ref 65–140)
GLUCOSE SERPL-MCNC: 144 MG/DL (ref 65–140)
GLUCOSE SERPL-MCNC: 162 MG/DL (ref 65–140)
HCT VFR BLD AUTO: 37.5 % (ref 34.8–46.1)
HGB BLD-MCNC: 11.7 G/DL (ref 11.5–15.4)
MCH RBC QN AUTO: 28.6 PG (ref 26.8–34.3)
MCHC RBC AUTO-ENTMCNC: 31.2 G/DL (ref 31.4–37.4)
MCV RBC AUTO: 92 FL (ref 82–98)
PLATELET # BLD AUTO: 256 THOUSANDS/UL (ref 149–390)
PMV BLD AUTO: 11.7 FL (ref 8.9–12.7)
POTASSIUM SERPL-SCNC: 4.2 MMOL/L (ref 3.5–5.3)
PROT SERPL-MCNC: 5.5 G/DL (ref 6.4–8.4)
RBC # BLD AUTO: 4.09 MILLION/UL (ref 3.81–5.12)
SODIUM SERPL-SCNC: 140 MMOL/L (ref 135–147)
WBC # BLD AUTO: 7.95 THOUSAND/UL (ref 4.31–10.16)

## 2024-01-20 PROCEDURE — 94760 N-INVAS EAR/PLS OXIMETRY 1: CPT

## 2024-01-20 PROCEDURE — 80053 COMPREHEN METABOLIC PANEL: CPT | Performed by: FAMILY MEDICINE

## 2024-01-20 PROCEDURE — 94762 N-INVAS EAR/PLS OXIMTRY CONT: CPT

## 2024-01-20 PROCEDURE — 85027 COMPLETE CBC AUTOMATED: CPT | Performed by: FAMILY MEDICINE

## 2024-01-20 PROCEDURE — 82948 REAGENT STRIP/BLOOD GLUCOSE: CPT

## 2024-01-20 PROCEDURE — 99232 SBSQ HOSP IP/OBS MODERATE 35: CPT | Performed by: FAMILY MEDICINE

## 2024-01-20 PROCEDURE — 99232 SBSQ HOSP IP/OBS MODERATE 35: CPT | Performed by: INTERNAL MEDICINE

## 2024-01-20 RX ADMIN — TOPIRAMATE 100 MG: 100 TABLET, FILM COATED ORAL at 18:04

## 2024-01-20 RX ADMIN — QUETIAPINE 25 MG: 25 TABLET ORAL at 21:08

## 2024-01-20 RX ADMIN — SENNOSIDES, DOCUSATE SODIUM 2 TABLET: 8.6; 5 TABLET ORAL at 07:51

## 2024-01-20 RX ADMIN — INSULIN LISPRO 2 UNITS: 100 INJECTION, SOLUTION INTRAVENOUS; SUBCUTANEOUS at 21:14

## 2024-01-20 RX ADMIN — LAMOTRIGINE 150 MG: 100 TABLET ORAL at 21:08

## 2024-01-20 RX ADMIN — DEXAMETHASONE SODIUM PHOSPHATE 7.7 MG: 10 INJECTION, SOLUTION INTRAMUSCULAR; INTRAVENOUS at 03:04

## 2024-01-20 RX ADMIN — TOPIRAMATE 100 MG: 100 TABLET, FILM COATED ORAL at 07:51

## 2024-01-20 RX ADMIN — REMDESIVIR 100 MG: 100 INJECTION, POWDER, LYOPHILIZED, FOR SOLUTION INTRAVENOUS at 16:34

## 2024-01-20 RX ADMIN — CHLORHEXIDINE GLUCONATE 15 ML: 1.2 SOLUTION ORAL at 07:54

## 2024-01-20 RX ADMIN — VENLAFAXINE 25 MG: 25 TABLET ORAL at 16:36

## 2024-01-20 RX ADMIN — MELATONIN 6 MG: at 21:08

## 2024-01-20 RX ADMIN — VENLAFAXINE 25 MG: 25 TABLET ORAL at 07:53

## 2024-01-20 RX ADMIN — VENLAFAXINE 25 MG: 25 TABLET ORAL at 11:26

## 2024-01-20 RX ADMIN — DEXAMETHASONE SODIUM PHOSPHATE 7.7 MG: 10 INJECTION, SOLUTION INTRAMUSCULAR; INTRAVENOUS at 16:18

## 2024-01-20 RX ADMIN — ENOXAPARIN SODIUM 40 MG: 40 INJECTION SUBCUTANEOUS at 07:51

## 2024-01-20 RX ADMIN — CHLORHEXIDINE GLUCONATE 15 ML: 1.2 SOLUTION ORAL at 21:08

## 2024-01-20 RX ADMIN — SENNOSIDES, DOCUSATE SODIUM 2 TABLET: 8.6; 5 TABLET ORAL at 18:04

## 2024-01-20 RX ADMIN — LAMOTRIGINE 150 MG: 100 TABLET ORAL at 07:51

## 2024-01-20 NOTE — PROGRESS NOTES
Progress Note - Infectious Disease   Carla Hunt 58 y.o. female MRN: [id number]  Unit/Bed#: [facility] 726-01 Encounter: [id number]      Impression/Recommendations:  SIRS.  If patient does indeed had seizure, SIRS may be all secondary to it.   Consideration for COVID and bacterial pneumonia contributing to this.  Patient is overall clinically proved and she remains systemically nontoxic.  Temperature is down.  WBC normalized.  Admission blood cultures have no growth.  Patient completed antibiotic course.  She remains on treatment for COVID.  COVID treatment as in below.    Monitor temperature/WBC.     Encephalopathy.  Head CT and MRI did not show acute CVA or any mass lesion.  There is no seizure on EEG monitoring now but it is unclear whether patient may have a seizure during recent hospitalization at [facility] prior to transfer.  Clinical picture was never suggestive of bacterial meningitis but HSV encephalitis was possible.  However, patient had 2 lumbar punctures, both with no evidence of CSF inflammation and negative HSV PCR.  Patient had been on IV acyclovir but this was discontinued.  Mental status is back to normal.  No further anti-infective needed for this.  Monitor mental status.     Severe COVID, with acute hypoxic respiratory failure.  Patient is currently on remdesivir and dexamethasone and did receive 1 dose of Tocilizumab.  CXR with relatively mild opacities.  With elevated procalcitonin, there is concern of possible concurrent bacterial pneumonia.  Patient did receive initial rounds of COVID vaccination but not the new COVID vaccination available this past fall.  Procalcitonin decreasing.  Patient completed antibiotic course for bacterial pneumonia.  She remains on remdesivir and dexamethasone.  Completing remdesivir course today.  Continue dexamethasone.  Observe off further antibiotic.  Monitor respiratory symptoms and O2 saturation.     Acute hypoxic respiratory failure, likely  multifactorial, with COVID contributing.  Respiratory status improving, with O2 support slowly weaning.  O2 support per critical service.     CKD.  Creatinine improved from admission.  Antibiotic and antiviral dosages adjusted accordingly.  Monitor creatinine.     Seizure disorder, status post temporal lobe resection in .  There was no active seizure on EEG monitoring but it is unclear whether patient may have had seizure earlier.  Neurology follow-up.     B-cell lymphoma, on chemotherapy.  Patient was leukopenic admission but not neutropenic.  WBC is now in normal range.  Monitor WBC/ANC.     Discussed with patient in detail regarding the above plan.     Antibiotics:  Off antibiotic     Subjective:  Patient is comfortable.  She is awake and alert, with no further confusion.  No headache.  Dyspnea improving.  Cough improving.  Patient is able to ambulate in room with minimal dyspnea.  Temperature stays down.  No chills.  No diarrhea.     Objective:  Vitals:  Temp:  [98 °F (36.7 °C)-99.1 °F (37.3 °C)] 98.1 °F (36.7 °C)  HR:  [] 104  Resp:  [16-18] 18  BP: (105-134)/(68-80) 120/74  SpO2:  [91 %-95 %] 91 %  Temp (24hrs), Av.3 °F (36.8 °C), Min:98 °F (36.7 °C), Max:99.1 °F (37.3 °C)  Current: Temperature: 98.1 °F (36.7 °C)    Physical Exam:     General: Awake, alert, cooperative, no distress.   Neck:  Supple. No mass.  No lymphadenopathy.   Lungs: Expansion symmetric, improving basilar rhonchi, no rales, no wheezing, respirations unlabored.   Heart:  Regular rate and rhythm, S1 and S2 normal, no murmur.   Abdomen: Soft, nondistended, non-tender, bowel sounds active all four quadrants, no masses, no organomegaly.   Extremities: No edema. No erythema/warmth. No ulcer. Nontender to palpation.   Skin:  No rash.   Neuro: Moves all extremities.     Invasive Devices       Peripheral Intravenous Line  Duration             Peripheral IV 24 Left Antecubital <1 day                    Labs studies:   I have  personally reviewed pertinent labs.  Results from last 7 days   Lab Units 01/20/24  0426 01/19/24  0509 01/18/24  1322   POTASSIUM mmol/L 4.2 4.0 4.6   CHLORIDE mmol/L 106 108 106   CO2 mmol/L 29 26 26   BUN mg/dL 28* 31* 32*   CREATININE mg/dL 0.91 1.05 1.05   EGFR ml/min/1.73sq m 70 59 59   CALCIUM mg/dL 9.4 9.4 9.9   AST U/L 35 33 42*   ALT U/L 52 54* 56*   ALK PHOS U/L 99 109* 111*     Results from last 7 days   Lab Units 01/20/24  0426 01/19/24  0509 01/18/24  0949   WBC Thousand/uL 7.95 9.33 10.77*   HEMOGLOBIN g/dL 11.7 12.4 11.7   PLATELETS Thousands/uL 256 271 269     Results from last 7 days   Lab Units 01/15/24  1544   GRAM STAIN RESULT  Rare Mononuclear Cells  No organisms seen       Imaging Studies:   I have personally reviewed pertinent imaging study reports and images in PACS.    EKG, Pathology, and Other Studies:   I have personally reviewed pertinent reports.

## 2024-01-20 NOTE — ASSESSMENT & PLAN NOTE
Supplemental oxygen as needed -severe pathway  Actermra 1/9  CRP trending down  0.1 mgkg through 1/22  Remdesivir through 1/21  Empxaparin BID  Due to patient's immunocompromise state patient will need to have 2 COVID antigen negative results prior to dc Isolation   First 1 negative

## 2024-01-20 NOTE — ASSESSMENT & PLAN NOTE
Lab Results   Component Value Date    EGFR 70 01/20/2024    EGFR 59 01/19/2024    EGFR 59 01/18/2024    CREATININE 0.91 01/20/2024    CREATININE 1.05 01/19/2024    CREATININE 1.05 01/18/2024     Monitor BMP

## 2024-01-20 NOTE — ASSESSMENT & PLAN NOTE
Patient transferred from ICU to stepdown 2  Patient had lumbar puncture done culture negative  Negative HSV PCR  Follow-up flow cytometry, cytology, paraneoplastic panel  Melatonin and Seroquel started  Significantly improved

## 2024-01-20 NOTE — PROGRESS NOTES
[facility]  Progress Note  Name: Carla Hunt I  MRN: [id number]  Unit/Bed#: [facility] 726-01 I Date of Admission: 1/10/2024   Date of Service: 1/20/2024 I Hospital Day: 10    Assessment/Plan   * Encephalopathy acute  Assessment & Plan  Patient transferred from ICU to stepdown 2  Patient had lumbar puncture done culture negative  Negative HSV PCR  Follow-up flow cytometry, cytology, paraneoplastic panel  Melatonin and Seroquel started  Significantly improved      Seizure disorder (HCC)  Assessment & Plan  S/p Temporal lobectomy in 2007  Continue home Lamictal and Topamax    Acute respiratory failure with hypoxia (HCC)  Assessment & Plan  Patient weaned down to mid flow 9 L  Wean as tolerated  Procalcitonin 0.6-0.3-0.15  Ceftriaxone finished 1/15  Patient diagnosed with severe COVID please see management below    COVID  Assessment & Plan  Supplemental oxygen as needed -severe pathway  Actermra 1/9  CRP trending down  0.1 mgkg through 1/22  Remdesivir through 1/21  Empxaparin BID  Due to patient's immunocompromise state patient will need to have 2 COVID antigen negative results prior to dc Isolation   First 1 negative    Dysphagia  Assessment & Plan  Speech following    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    Mobility:   Basic Mobility Inpatient Raw Score: 19  JH-HLM Goal: 6: Walk 10 steps or more  JH-HLM Achieved: 6: Walk 10  steps or more  HLM Goal achieved. Continue to encourage appropriate mobility.    Patient Centered Rounds: I performed bedside rounds with nursing staff today.   Discussions with Specialists or Other Care Team Provider: ID regarding continuing COVID treatment and monitoring patient off antibiotics    Education and Discussions with Family / Patient:  Patient is updating her  and daughter.     Total Time Spent on Date of Encounter in care of patient: 35 mins. This time was spent on one or more of the following: performing physical exam; counseling and coordination of care; obtaining or reviewing history; documenting in the medical record; reviewing/ordering tests, medications or procedures; communicating with other healthcare professionals and discussing with patient's family/caregivers.    Current Length of Stay: 10 day(s)  Current Patient Status: Inpatient   Certification Statement: The patient will continue to require additional inpatient hospital stay due to acute respiratory failure weaning down off of oxygen completing COVID treatment  Discharge Plan: Anticipate discharge in >72 hrs to rehab facility.    Code Status: Level 1 - Full Code    Subjective:   This is a very pleasant 58-year-old female who was seen and evaluated today at bedside.  Patient has no acute complaints at this time.    Objective:     Vitals:   Temp (24hrs), Av.3 °F (36.8 °C), Min:98 °F (36.7 °C), Max:99.1 °F (37.3 °C)    Temp:  [98 °F (36.7 °C)-99.1 °F (37.3 °C)] 98.5 °F (36.9 °C)  HR:  [] 105  Resp:  [15-18] 15  BP: ()/(60-80) 87/60  SpO2:  [90 %-95 %] 90 %  Body mass index is 25.82 kg/m².     Input and Output Summary (last 24 hours):     Intake/Output Summary (Last 24 hours) at 2024 1451  Last data filed at 2024 0800  Gross per 24 hour   Intake 480 ml   Output --   Net 480 ml       Physical Exam:   Physical Exam  Vitals reviewed.   Constitutional:       General: She is not in acute distress.     Appearance:  Normal appearance. She is not ill-appearing.   HENT:      Head: Normocephalic and atraumatic.      Right Ear: External ear normal.      Left Ear: External ear normal.      Nose: Nose normal.   Eyes:      Extraocular Movements: Extraocular movements intact.      Conjunctiva/sclera: Conjunctivae normal.   Cardiovascular:      Rate and Rhythm: Normal rate and regular rhythm.      Pulses: Normal pulses.      Heart sounds: Normal heart sounds.   Pulmonary:      Effort: Pulmonary effort is normal.      Comments: Decreased breath sounds  Nasal cannula in place  Abdominal:      General: Abdomen is flat. Bowel sounds are normal.      Palpations: Abdomen is soft.   Musculoskeletal:         General: No deformity or signs of injury.      Cervical back: Normal range of motion.   Skin:     General: Skin is warm and dry.   Neurological:      General: No focal deficit present.      Mental Status: She is alert. Mental status is at baseline.   Psychiatric:         Mood and Affect: Mood normal.         Behavior: Behavior normal.          Additional Data:     Labs:  Results from last 7 days   Lab Units 01/20/24  0426 01/19/24  0509 01/18/24  0949   WBC Thousand/uL 7.95   < > 10.77*   HEMOGLOBIN g/dL 11.7   < > 11.7   HEMATOCRIT % 37.5   < > 35.9   PLATELETS Thousands/uL 256   < > 269   BANDS PCT %  --   --  1   LYMPHO PCT %  --   --  0*   MONO PCT %  --   --  3*   EOS PCT %  --   --  0    < > = values in this interval not displayed.     Results from last 7 days   Lab Units 01/20/24  0426   SODIUM mmol/L 140   POTASSIUM mmol/L 4.2   CHLORIDE mmol/L 106   CO2 mmol/L 29   BUN mg/dL 28*   CREATININE mg/dL 0.91   ANION GAP mmol/L 5   CALCIUM mg/dL 9.4   ALBUMIN g/dL 3.4*   TOTAL BILIRUBIN mg/dL 0.53   ALK PHOS U/L 99   ALT U/L 52   AST U/L 35   GLUCOSE RANDOM mg/dL 103     Results from last 7 days   Lab Units 01/18/24  1322   INR  1.21*     Results from last 7 days   Lab Units 01/20/24  1123 01/20/24  0732 01/19/24  2119 01/19/24  1548  01/19/24  1058 01/19/24  0648 01/18/24  2059 01/18/24  1625 01/18/24  1047 01/18/24  0612 01/17/24  2035 01/17/24  1629   POC GLUCOSE mg/dl 144* 136 229* 144* 147* 128 255* 134 247* 136 244* 151*         Results from last 7 days   Lab Units 01/16/24  0445 01/14/24  0521   PROCALCITONIN ng/ml 0.14 0.32*       Lines/Drains:  Invasive Devices       Peripheral Intravenous Line  Duration             Peripheral IV 01/20/24 Left Antecubital <1 day                          Imaging: No pertinent imaging reviewed.    Recent Cultures (last 7 days):   Results from last 7 days   Lab Units 01/15/24  1544   GRAM STAIN RESULT  Rare Mononuclear Cells  No organisms seen       Last 24 Hours Medication List:   Current Facility-Administered Medications   Medication Dose Route Frequency Provider Last Rate    acetaminophen  650 mg Oral Q6H PRN Laura Rodriguez, DO      bisacodyl  10 mg Rectal Daily PRN Laura Rodriguez, DO      chlorhexidine  15 mL Mouth/Throat Q12H SARTHAK Laura Rodriguez, DO      dexamethasone  0.1 mg/kg Intravenous Q12H Laura Rodriguez, DO      enoxaparin  40 mg Subcutaneous Daily Laura Rodriguez, DO      insulin lispro  2-12 Units Subcutaneous TID With Meals Laura Rodriguez, DO      insulin lispro  2-12 Units Subcutaneous HS Laura Rodriguez, DO      lamoTRIgine  150 mg Oral BID Laura Rodriguez, DO      melatonin  6 mg Oral HS Laura Rodriguez, DO      ondansetron  4 mg Intravenous Q6H PRN Laura Rodriguez, DO      polyethylene glycol  17 g Oral Daily Laura Rodriguez, DO      QUEtiapine  25 mg Oral HS Laura Rodriguez, DO      remdesivir  100 mg Intravenous Q24H Laura Rodriguez, DO 0 mg (01/15/24 1715)    senna-docusate sodium  2 tablet Oral BID Laura Rodriguez, DO      topiramate  100 mg Oral BID Laura Rodriguez, DO      venlafaxine  25 mg Oral TID With Meals Laura Rodriguez, DO          Today, Patient Was Seen By: Jerardo Johnson MD    **Please Note: This note may have been constructed using a voice  recognition system.**

## 2024-01-20 NOTE — ASSESSMENT & PLAN NOTE
Patient weaned down to mid flow 9 L  Wean as tolerated  Procalcitonin 0.6-0.3-0.15  Ceftriaxone finished 1/15  Patient diagnosed with severe COVID please see management below

## 2024-01-21 LAB
ALBUMIN SERPL BCP-MCNC: 3.4 G/DL (ref 3.5–5)
ALP SERPL-CCNC: 89 U/L (ref 34–104)
ALT SERPL W P-5'-P-CCNC: 48 U/L (ref 7–52)
ANION GAP SERPL CALCULATED.3IONS-SCNC: 6 MMOL/L
AST SERPL W P-5'-P-CCNC: 25 U/L (ref 13–39)
BILIRUB SERPL-MCNC: 0.57 MG/DL (ref 0.2–1)
BUN SERPL-MCNC: 28 MG/DL (ref 5–25)
CALCIUM ALBUM COR SERPL-MCNC: 10 MG/DL (ref 8.3–10.1)
CALCIUM SERPL-MCNC: 9.5 MG/DL (ref 8.4–10.2)
CHLORIDE SERPL-SCNC: 109 MMOL/L (ref 96–108)
CO2 SERPL-SCNC: 28 MMOL/L (ref 21–32)
CREAT SERPL-MCNC: 1 MG/DL (ref 0.6–1.3)
ERYTHROCYTE [DISTWIDTH] IN BLOOD BY AUTOMATED COUNT: 16.8 % (ref 11.6–15.1)
GFR SERPL CREATININE-BSD FRML MDRD: 62 ML/MIN/1.73SQ M
GLUCOSE SERPL-MCNC: 101 MG/DL (ref 65–140)
GLUCOSE SERPL-MCNC: 103 MG/DL (ref 65–140)
GLUCOSE SERPL-MCNC: 116 MG/DL (ref 65–140)
GLUCOSE SERPL-MCNC: 135 MG/DL (ref 65–140)
GLUCOSE SERPL-MCNC: 242 MG/DL (ref 65–140)
HCT VFR BLD AUTO: 37.9 % (ref 34.8–46.1)
HGB BLD-MCNC: 12.2 G/DL (ref 11.5–15.4)
MCH RBC QN AUTO: 29.7 PG (ref 26.8–34.3)
MCHC RBC AUTO-ENTMCNC: 32.2 G/DL (ref 31.4–37.4)
MCV RBC AUTO: 92 FL (ref 82–98)
PLATELET # BLD AUTO: 230 THOUSANDS/UL (ref 149–390)
PMV BLD AUTO: 11.4 FL (ref 8.9–12.7)
POTASSIUM SERPL-SCNC: 4.4 MMOL/L (ref 3.5–5.3)
PROT SERPL-MCNC: 5.3 G/DL (ref 6.4–8.4)
RBC # BLD AUTO: 4.11 MILLION/UL (ref 3.81–5.12)
SODIUM SERPL-SCNC: 143 MMOL/L (ref 135–147)
WBC # BLD AUTO: 7.83 THOUSAND/UL (ref 4.31–10.16)

## 2024-01-21 PROCEDURE — 80053 COMPREHEN METABOLIC PANEL: CPT | Performed by: FAMILY MEDICINE

## 2024-01-21 PROCEDURE — 85027 COMPLETE CBC AUTOMATED: CPT | Performed by: FAMILY MEDICINE

## 2024-01-21 PROCEDURE — 94760 N-INVAS EAR/PLS OXIMETRY 1: CPT

## 2024-01-21 PROCEDURE — 99232 SBSQ HOSP IP/OBS MODERATE 35: CPT | Performed by: FAMILY MEDICINE

## 2024-01-21 PROCEDURE — 99232 SBSQ HOSP IP/OBS MODERATE 35: CPT | Performed by: INTERNAL MEDICINE

## 2024-01-21 PROCEDURE — 82948 REAGENT STRIP/BLOOD GLUCOSE: CPT

## 2024-01-21 RX ADMIN — VENLAFAXINE 25 MG: 25 TABLET ORAL at 16:41

## 2024-01-21 RX ADMIN — SENNOSIDES, DOCUSATE SODIUM 2 TABLET: 8.6; 5 TABLET ORAL at 16:41

## 2024-01-21 RX ADMIN — LAMOTRIGINE 150 MG: 100 TABLET ORAL at 08:58

## 2024-01-21 RX ADMIN — DEXAMETHASONE SODIUM PHOSPHATE 7.7 MG: 10 INJECTION, SOLUTION INTRAMUSCULAR; INTRAVENOUS at 16:41

## 2024-01-21 RX ADMIN — SENNOSIDES, DOCUSATE SODIUM 2 TABLET: 8.6; 5 TABLET ORAL at 08:58

## 2024-01-21 RX ADMIN — DEXAMETHASONE SODIUM PHOSPHATE 7.7 MG: 10 INJECTION, SOLUTION INTRAMUSCULAR; INTRAVENOUS at 03:59

## 2024-01-21 RX ADMIN — QUETIAPINE 25 MG: 25 TABLET ORAL at 21:44

## 2024-01-21 RX ADMIN — CHLORHEXIDINE GLUCONATE 15 ML: 1.2 SOLUTION ORAL at 08:58

## 2024-01-21 RX ADMIN — TOPIRAMATE 100 MG: 100 TABLET, FILM COATED ORAL at 08:58

## 2024-01-21 RX ADMIN — LAMOTRIGINE 150 MG: 100 TABLET ORAL at 21:44

## 2024-01-21 RX ADMIN — POLYETHYLENE GLYCOL 3350 17 G: 17 POWDER, FOR SOLUTION ORAL at 08:58

## 2024-01-21 RX ADMIN — MELATONIN 6 MG: at 21:44

## 2024-01-21 RX ADMIN — VENLAFAXINE 25 MG: 25 TABLET ORAL at 08:59

## 2024-01-21 RX ADMIN — TOPIRAMATE 100 MG: 100 TABLET, FILM COATED ORAL at 16:41

## 2024-01-21 RX ADMIN — CHLORHEXIDINE GLUCONATE 15 ML: 1.2 SOLUTION ORAL at 21:44

## 2024-01-21 RX ADMIN — ENOXAPARIN SODIUM 40 MG: 40 INJECTION SUBCUTANEOUS at 08:58

## 2024-01-21 RX ADMIN — VENLAFAXINE 25 MG: 25 TABLET ORAL at 11:38

## 2024-01-21 RX ADMIN — INSULIN LISPRO 4 UNITS: 100 INJECTION, SOLUTION INTRAVENOUS; SUBCUTANEOUS at 21:48

## 2024-01-21 NOTE — PLAN OF CARE
Problem: Prexisting or High Potential for Compromised Skin Integrity  Goal: Skin integrity is maintained or improved  Description: INTERVENTIONS:  - Identify patients at risk for skin breakdown  - Assess and monitor skin integrity  - Assess and monitor nutrition and hydration status  - Monitor labs   - Assess for incontinence   - Turn and reposition patient  - Assist with mobility/ambulation  - Relieve pressure over bony prominences  - Avoid friction and shearing  - Provide appropriate hygiene as needed including keeping skin clean and dry  - Evaluate need for skin moisturizer/barrier cream  - Collaborate with interdisciplinary team   - Patient/family teaching  - Consider wound care consult   Outcome: Progressing     Problem: Nutrition/Hydration-ADULT  Goal: Nutrient/Hydration intake appropriate for improving, restoring or maintaining nutritional needs  Description: Monitor and assess patient's nutrition/hydration status for malnutrition. Collaborate with interdisciplinary team and initiate plan and interventions as ordered.  Monitor patient's weight and dietary intake as ordered or per policy. Utilize nutrition screening tool and intervene as necessary. Determine patient's food preferences and provide high-protein, high-caloric foods as appropriate.     INTERVENTIONS:  - Monitor oral intake, urinary output, labs, and treatment plans  - Assess nutrition and hydration status and recommend course of action  - Evaluate amount of meals eaten  - Assist patient with eating if necessary   - Allow adequate time for meals  - Recommend/ encourage appropriate diets, oral nutritional supplements, and vitamin/mineral supplements  - Order, calculate, and assess calorie counts as needed  - Recommend, monitor, and adjust tube feedings and TPN/PPN based on assessed needs  - Assess need for intravenous fluids  - Provide specific nutrition/hydration education as appropriate  - Include patient/family/caregiver in decisions related to  nutrition  Outcome: Progressing     Problem: PAIN - ADULT  Goal: Verbalizes/displays adequate comfort level or baseline comfort level  Description: Interventions:  - Encourage patient to monitor pain and request assistance  - Assess pain using appropriate pain scale  - Administer analgesics based on type and severity of pain and evaluate response  - Implement non-pharmacological measures as appropriate and evaluate response  - Consider cultural and social influences on pain and pain management  - Notify physician/advanced practitioner if interventions unsuccessful or patient reports new pain  Outcome: Progressing     Problem: INFECTION - ADULT  Goal: Absence or prevention of progression during hospitalization  Description: INTERVENTIONS:  - Assess and monitor for signs and symptoms of infection  - Monitor lab/diagnostic results  - Monitor all insertion sites, i.e. indwelling lines, tubes, and drains  - Monitor endotracheal if appropriate and nasal secretions for changes in amount and color  - [place] appropriate cooling/warming therapies per order  - Administer medications as ordered  - Instruct and encourage patient and family to use good hand hygiene technique  - Identify and instruct in appropriate isolation precautions for identified infection/condition  Outcome: Progressing     Problem: SAFETY ADULT  Goal: Patient will remain free of falls  Description: INTERVENTIONS:  - Educate patient/family on patient safety including physical limitations  - Instruct patient to call for assistance with activity   - Consult OT/PT to assist with strengthening/mobility   - Keep Call bell within reach  - Keep bed low and locked with side rails adjusted as appropriate  - Keep care items and personal belongings within reach  - Initiate and maintain comfort rounds  - Make Fall Risk Sign visible to staff  - Offer Toileting every 2 Hours, in advance of need  - Initiate/Maintain bed alarm  - Obtain necessary fall risk management  equipment: non skid footwear  - Apply yellow socks and bracelet for high fall risk patients  - Consider moving patient to room near nurses station  Outcome: Progressing

## 2024-01-21 NOTE — ASSESSMENT & PLAN NOTE
Patient weaned down to mid flow 8 L  Wean as tolerated  Procalcitonin 0.6-0.3-0.15  Ceftriaxone finished 1/15  Patient diagnosed with severe COVID please see management below

## 2024-01-21 NOTE — PLAN OF CARE
Problem: PAIN - ADULT  Goal: Verbalizes/displays adequate comfort level or baseline comfort level  Description: Interventions:  - Encourage patient to monitor pain and request assistance  - Assess pain using appropriate pain scale  - Administer analgesics based on type and severity of pain and evaluate response  - Implement non-pharmacological measures as appropriate and evaluate response  - Consider cultural and social influences on pain and pain management  - Notify physician/advanced practitioner if interventions unsuccessful or patient reports new pain  Outcome: Progressing     Problem: Nutrition/Hydration-ADULT  Goal: Nutrient/Hydration intake appropriate for improving, restoring or maintaining nutritional needs  Description: Monitor and assess patient's nutrition/hydration status for malnutrition. Collaborate with interdisciplinary team and initiate plan and interventions as ordered.  Monitor patient's weight and dietary intake as ordered or per policy. Utilize nutrition screening tool and intervene as necessary. Determine patient's food preferences and provide high-protein, high-caloric foods as appropriate.     INTERVENTIONS:  - Monitor oral intake, urinary output, labs, and treatment plans  - Assess nutrition and hydration status and recommend course of action  - Evaluate amount of meals eaten  - Assist patient with eating if necessary   - Allow adequate time for meals  - Recommend/ encourage appropriate diets, oral nutritional supplements, and vitamin/mineral supplements  - Order, calculate, and assess calorie counts as needed  - Recommend, monitor, and adjust tube feedings and TPN/PPN based on assessed needs  - Assess need for intravenous fluids  - Provide specific nutrition/hydration education as appropriate  - Include patient/family/caregiver in decisions related to nutrition  Outcome: Progressing     Problem: Prexisting or High Potential for Compromised Skin Integrity  Goal: Skin integrity is  maintained or improved  Description: INTERVENTIONS:  - Identify patients at risk for skin breakdown  - Assess and monitor skin integrity  - Assess and monitor nutrition and hydration status  - Monitor labs   - Assess for incontinence   - Turn and reposition patient  - Assist with mobility/ambulation  - Relieve pressure over bony prominences  - Avoid friction and shearing  - Provide appropriate hygiene as needed including keeping skin clean and dry  - Evaluate need for skin moisturizer/barrier cream  - Collaborate with interdisciplinary team   - Patient/family teaching  - Consider wound care consult   Outcome: Progressing

## 2024-01-21 NOTE — PROGRESS NOTES
Progress Note - Infectious Disease   Carla Hunt 58 y.o. female MRN: [id number]  Unit/Bed#: [facility] 726-01 Encounter: [id number]      Impression/Recommendations:  SIRS.  If patient does indeed had seizure, SIRS may be all secondary to it.   Consideration for COVID and bacterial pneumonia contributing to this.  Patient is overall clinically proved and she remains systemically nontoxic.  Temperature is down.  WBC normalized.  Admission blood cultures have no growth.  Patient completed antibiotic course.  She remains on treatment for COVID.  COVID treatment as in below.    Monitor temperature/WBC.     Encephalopathy.  Head CT and MRI did not show acute CVA or any mass lesion.  There is no seizure on EEG monitoring now but it is unclear whether patient may have a seizure during recent hospitalization at [facility] prior to transfer.  Clinical picture was never suggestive of bacterial meningitis but HSV encephalitis was possible.  However, patient had 2 lumbar punctures, both with no evidence of CSF inflammation and negative HSV PCR.  Patient had been on IV acyclovir but this was discontinued.  Mental status is back to normal.  No further anti-infective needed for this.  Monitor mental status.     Severe COVID, with acute hypoxic respiratory failure.  Patient is currently on remdesivir and dexamethasone and did receive 1 dose of Tocilizumab.  CXR with relatively mild opacities.  With elevated procalcitonin, there is concern of possible concurrent bacterial pneumonia.  Patient did receive initial rounds of COVID vaccination but not the new COVID vaccination available this past fall.  Procalcitonin decreasing.  Patient completed antibiotic course for bacterial pneumonia.  She completed remdesivir course and remains on dexamethasone.  Continue dexamethasone.  Observe off further antibiotic.  Monitor respiratory symptoms and O2 saturation.     Acute hypoxic respiratory failure, likely multifactorial, with COVID  contributing.  Respiratory status improving, with O2 support slowly weaning.  O2 support per critical service.     CKD.  Creatinine improved from admission.  Antibiotic and antiviral dosages adjusted accordingly.  Monitor creatinine.     Seizure disorder, status post temporal lobe resection in .  There was no active seizure on EEG monitoring but it is unclear whether patient may have had seizure earlier.  Neurology follow-up.     B-cell lymphoma, on chemotherapy.  Patient was leukopenic admission but not neutropenic.  WBC is now in normal range.  Monitor WBC/ANC.     Discussed with patient in detail regarding the above plan.     Antibiotics:  Off antibiotic     Subjective:  Patient is comfortable.  She is awake and alert, with no further confusion.  No headache.  Dyspnea continues to improve slowly.  Cough improving.  Patient is able to ambulate in room with minimal dyspnea.  Temperature stays down.  No chills.  No diarrhea.      Objective:  Vitals:  Temp:  [97.7 °F (36.5 °C)-98.9 °F (37.2 °C)] 97.7 °F (36.5 °C)  HR:  [] 95  Resp:  [16-20] 16  BP: (102-126)/(58-74) 106/70  SpO2:  [91 %-95 %] 95 %  Temp (24hrs), Av.3 °F (36.8 °C), Min:97.7 °F (36.5 °C), Max:98.9 °F (37.2 °C)  Current: Temperature: 97.7 °F (36.5 °C)    Physical Exam:     General: Awake, alert, cooperative, no distress.   Neck:  Supple. No mass.  No lymphadenopathy.   Lungs: Expansion symmetric, mild basilar rhonchi, no rales, no wheezing, respirations unlabored.   Heart:  Regular rate and rhythm, S1 and S2 normal, no murmur.   Abdomen: Soft, nondistended, non-tender, bowel sounds active all four quadrants, no masses, no organomegaly.   Extremities: No edema. No erythema/warmth. No ulcer. Nontender to palpation.   Skin:  No rash.   Neuro: Moves all extremities.     Invasive Devices       Peripheral Intravenous Line  Duration             Peripheral IV 24 Left Antecubital 1 day                    Labs studies:   I have personally  reviewed pertinent labs.  Results from last 7 days   Lab Units 01/21/24  0505 01/20/24  0426 01/19/24  0509   POTASSIUM mmol/L 4.4 4.2 4.0   CHLORIDE mmol/L 109* 106 108   CO2 mmol/L 28 29 26   BUN mg/dL 28* 28* 31*   CREATININE mg/dL 1.00 0.91 1.05   EGFR ml/min/1.73sq m 62 70 59   CALCIUM mg/dL 9.5 9.4 9.4   AST U/L 25 35 33   ALT U/L 48 52 54*   ALK PHOS U/L 89 99 109*     Results from last 7 days   Lab Units 01/21/24  0505 01/20/24  0426 01/19/24  0509   WBC Thousand/uL 7.83 7.95 9.33   HEMOGLOBIN g/dL 12.2 11.7 12.4   PLATELETS Thousands/uL 230 256 271     Results from last 7 days   Lab Units 01/15/24  1544   GRAM STAIN RESULT  Rare Mononuclear Cells  No organisms seen       Imaging Studies:   I have personally reviewed pertinent imaging study reports and images in PACS.    EKG, Pathology, and Other Studies:   I have personally reviewed pertinent reports.

## 2024-01-21 NOTE — PROGRESS NOTES
[facility]  Progress Note  Name: Carla Hunt I  MRN: [id number]  Unit/Bed#: [facility] 726-01 I Date of Admission: 1/10/2024   Date of Service: 1/21/2024 I Hospital Day: 11    Assessment/Plan   * Encephalopathy acute  Assessment & Plan  Patient transferred from ICU to stepdown 2  Patient had lumbar puncture done culture negative  Negative HSV PCR  Follow-up flow cytometry, cytology, paraneoplastic panel  Melatonin and Seroquel started  Significantly improved      Seizure disorder (HCC)  Assessment & Plan  S/p Temporal lobectomy in 2007  Continue home Lamictal and Topamax    Acute respiratory failure with hypoxia (HCC)  Assessment & Plan  Patient weaned down to mid flow 8 L  Wean as tolerated  Procalcitonin 0.6-0.3-0.15  Ceftriaxone finished 1/15  Patient diagnosed with severe COVID please see management below    COVID  Assessment & Plan  Supplemental oxygen as needed -severe pathway  Actermra 1/9  CRP trending down  0.1 mgkg through 1/22  Remdesivir through 1/21  Empxaparin BID  Due to patient's immunocompromise state patient will need to have 2 COVID antigen negative results prior to dc Isolation   First 1 negative    Dysphagia  Assessment & Plan  Speech following    Teto marginal zone B-cell lymphoma (Formerly [facility])  Assessment & Plan  Outpatient f/u  maintenance rituxan hycela until 5/2024 plan     Stage 3b chronic kidney disease (CKD) (Formerly [facility])  Assessment & Plan  Lab Results   Component Value Date    EGFR 62 01/21/2024    EGFR 70 01/20/2024    EGFR 59 01/19/2024    CREATININE 1.00 01/21/2024    CREATININE 0.91 01/20/2024    CREATININE 1.05 01/19/2024     Monitor BMP    Anxiety state  Assessment & Plan  Home effexor              VTE Pharmacologic Prophylaxis: VTE Score: 11 High Risk (Score >/= 5) - Pharmacological DVT Prophylaxis Ordered: enoxaparin (Lovenox). Sequential Compression Devices Ordered.    Mobility:   Basic Mobility Inpatient Raw Score: 19  -[facility] Goal: 6: Walk 10 steps or  more  JH-HLM Achieved: 6: Walk 10 steps or more  HLM Goal achieved. Continue to encourage appropriate mobility.    Patient Centered Rounds: I performed bedside rounds with nursing staff today.   Discussions with Specialists or Other Care Team Provider: None    Education and Discussions with Family / Patient: Patient declined call to .     Total Time Spent on Date of Encounter in care of patient: 40 mins. This time was spent on one or more of the following: performing physical exam; counseling and coordination of care; obtaining or reviewing history; documenting in the medical record; reviewing/ordering tests, medications or procedures; communicating with other healthcare professionals and discussing with patient's family/caregivers.    Current Length of Stay: 11 day(s)  Current Patient Status: Inpatient   Certification Statement: The patient will continue to require additional inpatient hospital stay due to acute respiratory failure on mid flow second to severe COVID  Discharge Plan: Anticipate discharge in 24-48 hrs to rehab facility.    Code Status: Level 1 - Full Code    Subjective:   This is a very pleasant 58-year-old female who was seen and evaluated today at bedside.  Patient is eager to go home and is happy with the progress that she is making.    Objective:     Vitals:   Temp (24hrs), Av.3 °F (36.8 °C), Min:97.7 °F (36.5 °C), Max:98.9 °F (37.2 °C)    Temp:  [97.7 °F (36.5 °C)-98.9 °F (37.2 °C)] 97.7 °F (36.5 °C)  HR:  [] 95  Resp:  [16-20] 16  BP: (102-126)/(58-74) 106/70  SpO2:  [91 %-95 %] 95 %  Body mass index is 25.82 kg/m².     Input and Output Summary (last 24 hours):     Intake/Output Summary (Last 24 hours) at 2024 1527  Last data filed at 2024 0852  Gross per 24 hour   Intake 400 ml   Output --   Net 400 ml       Physical Exam:   Physical Exam  Vitals reviewed.   Constitutional:       General: She is not in acute distress.     Appearance: Normal appearance. She is  not ill-appearing.   HENT:      Head: Normocephalic and atraumatic.      Right Ear: External ear normal.      Left Ear: External ear normal.      Nose: Nose normal.   Eyes:      Extraocular Movements: Extraocular movements intact.      Conjunctiva/sclera: Conjunctivae normal.   Cardiovascular:      Rate and Rhythm: Normal rate and regular rhythm.      Pulses: Normal pulses.      Heart sounds: Normal heart sounds.   Pulmonary:      Effort: Pulmonary effort is normal.      Comments: Decreased breath sounds  Nasal cannula in place  Abdominal:      General: Abdomen is flat. Bowel sounds are normal.      Palpations: Abdomen is soft.   Musculoskeletal:         General: No deformity or signs of injury.      Cervical back: Normal range of motion.   Skin:     General: Skin is warm and dry.   Neurological:      General: No focal deficit present.      Mental Status: She is alert. Mental status is at baseline.   Psychiatric:         Mood and Affect: Mood normal.         Behavior: Behavior normal.          Additional Data:     Labs:  Results from last 7 days   Lab Units 01/21/24  0505 01/19/24  0509 01/18/24  0949   WBC Thousand/uL 7.83   < > 10.77*   HEMOGLOBIN g/dL 12.2   < > 11.7   HEMATOCRIT % 37.9   < > 35.9   PLATELETS Thousands/uL 230   < > 269   BANDS PCT %  --   --  1   LYMPHO PCT %  --   --  0*   MONO PCT %  --   --  3*   EOS PCT %  --   --  0    < > = values in this interval not displayed.     Results from last 7 days   Lab Units 01/21/24  0505   SODIUM mmol/L 143   POTASSIUM mmol/L 4.4   CHLORIDE mmol/L 109*   CO2 mmol/L 28   BUN mg/dL 28*   CREATININE mg/dL 1.00   ANION GAP mmol/L 6   CALCIUM mg/dL 9.5   ALBUMIN g/dL 3.4*   TOTAL BILIRUBIN mg/dL 0.57   ALK PHOS U/L 89   ALT U/L 48   AST U/L 25   GLUCOSE RANDOM mg/dL 103     Results from last 7 days   Lab Units 01/18/24  1322   INR  1.21*     Results from last 7 days   Lab Units 01/21/24  1107 01/21/24  0620 01/20/24  2101 01/20/24  1610 01/20/24  1123 01/20/24  0732  01/19/24  2119 01/19/24  1548 01/19/24  1058 01/19/24  0648 01/18/24  2059 01/18/24  1625   POC GLUCOSE mg/dl 135 116 162* 115 144* 136 229* 144* 147* 128 255* 134         Results from last 7 days   Lab Units 01/16/24  0445   PROCALCITONIN ng/ml 0.14       Lines/Drains:  Invasive Devices       Peripheral Intravenous Line  Duration             Peripheral IV 01/20/24 Left Antecubital 1 day                          Imaging: No pertinent imaging reviewed.    Recent Cultures (last 7 days):   Results from last 7 days   Lab Units 01/15/24  1544   GRAM STAIN RESULT  Rare Mononuclear Cells  No organisms seen       Last 24 Hours Medication List:   Current Facility-Administered Medications   Medication Dose Route Frequency Provider Last Rate    acetaminophen  650 mg Oral Q6H PRN Laura Rodriguez, DO      bisacodyl  10 mg Rectal Daily PRN Laura Rodriguez, DO      chlorhexidine  15 mL Mouth/Throat Q12H SARTHAK Laura Rodriguez, DO      dexamethasone  0.1 mg/kg Intravenous Q12H Laura Rodriguez, DO      enoxaparin  40 mg Subcutaneous Daily Laura Rodriguez, DO      insulin lispro  2-12 Units Subcutaneous TID With Meals Laura Rodriguez, DO      insulin lispro  2-12 Units Subcutaneous HS Laura Rodriguez, DO      lamoTRIgine  150 mg Oral BID Laura Rodriguez, DO      melatonin  6 mg Oral HS Laura Rodriguez, DO      ondansetron  4 mg Intravenous Q6H PRN Laura Rodriguez, DO      polyethylene glycol  17 g Oral Daily Laura Rodriguez, DO      QUEtiapine  25 mg Oral HS Laura Rodriguez, DO      senna-docusate sodium  2 tablet Oral BID Laura Rodriguez, DO      topiramate  100 mg Oral BID Laura Rodriguez, DO      venlafaxine  25 mg Oral TID With Meals Laura Rodriguez, DO          Today, Patient Was Seen By: Jerardo Johnson MD    **Please Note: This note may have been constructed using a voice recognition system.**

## 2024-01-21 NOTE — ASSESSMENT & PLAN NOTE
Lab Results   Component Value Date    EGFR 62 01/21/2024    EGFR 70 01/20/2024    EGFR 59 01/19/2024    CREATININE 1.00 01/21/2024    CREATININE 0.91 01/20/2024    CREATININE 1.05 01/19/2024     Monitor BMP

## 2024-01-22 ENCOUNTER — APPOINTMENT (INPATIENT)
Dept: RADIOLOGY | Facility: HOSPITAL | Age: 59
DRG: 003 | End: 2024-01-22
Attending: FAMILY MEDICINE
Payer: COMMERCIAL

## 2024-01-22 LAB
ANION GAP SERPL CALCULATED.3IONS-SCNC: 6 MMOL/L
ANISOCYTOSIS BLD QL SMEAR: PRESENT
BASOPHILS # BLD MANUAL: 0 THOUSAND/UL (ref 0–0.1)
BASOPHILS NFR MAR MANUAL: 0 % (ref 0–1)
BUN SERPL-MCNC: 34 MG/DL (ref 5–25)
CALCIUM SERPL-MCNC: 9.7 MG/DL (ref 8.4–10.2)
CHLORIDE SERPL-SCNC: 107 MMOL/L (ref 96–108)
CO2 SERPL-SCNC: 27 MMOL/L (ref 21–32)
CREAT SERPL-MCNC: 1.05 MG/DL (ref 0.6–1.3)
EOSINOPHIL # BLD MANUAL: 0 THOUSAND/UL (ref 0–0.4)
EOSINOPHIL NFR BLD MANUAL: 0 % (ref 0–6)
ERYTHROCYTE [DISTWIDTH] IN BLOOD BY AUTOMATED COUNT: 17.2 % (ref 11.6–15.1)
GFR SERPL CREATININE-BSD FRML MDRD: 59 ML/MIN/1.73SQ M
GLUCOSE SERPL-MCNC: 132 MG/DL (ref 65–140)
GLUCOSE SERPL-MCNC: 139 MG/DL (ref 65–140)
GLUCOSE SERPL-MCNC: 175 MG/DL (ref 65–140)
GLUCOSE SERPL-MCNC: 211 MG/DL (ref 65–140)
GLUCOSE SERPL-MCNC: 82 MG/DL (ref 65–140)
HCT VFR BLD AUTO: 39.6 % (ref 34.8–46.1)
HGB BLD-MCNC: 12.6 G/DL (ref 11.5–15.4)
LYMPHOCYTES # BLD AUTO: 0 % (ref 14–44)
LYMPHOCYTES # BLD AUTO: 0.1 THOUSAND/UL (ref 0.6–4.47)
MACROCYTES BLD QL AUTO: PRESENT
MCH RBC QN AUTO: 29.4 PG (ref 26.8–34.3)
MCHC RBC AUTO-ENTMCNC: 31.8 G/DL (ref 31.4–37.4)
MCV RBC AUTO: 92 FL (ref 82–98)
MONOCYTES # BLD AUTO: 0.31 THOUSAND/UL (ref 0–1.22)
MONOCYTES NFR BLD: 3 % (ref 4–12)
NEUTROPHILS # BLD MANUAL: 9.96 THOUSAND/UL (ref 1.85–7.62)
NEUTS BAND NFR BLD MANUAL: 5 % (ref 0–8)
NEUTS SEG NFR BLD AUTO: 91 % (ref 43–75)
PLATELET # BLD AUTO: 244 THOUSANDS/UL (ref 149–390)
PLATELET BLD QL SMEAR: ADEQUATE
PMV BLD AUTO: 11.5 FL (ref 8.9–12.7)
POLYCHROMASIA BLD QL SMEAR: PRESENT
POTASSIUM SERPL-SCNC: 4.7 MMOL/L (ref 3.5–5.3)
RBC # BLD AUTO: 4.29 MILLION/UL (ref 3.81–5.12)
RBC MORPH BLD: PRESENT
SCAN RESULT: NORMAL
SODIUM SERPL-SCNC: 140 MMOL/L (ref 135–147)
VARIANT LYMPHS # BLD AUTO: 1 %
WBC # BLD AUTO: 10.38 THOUSAND/UL (ref 4.31–10.16)

## 2024-01-22 PROCEDURE — 85007 BL SMEAR W/DIFF WBC COUNT: CPT | Performed by: FAMILY MEDICINE

## 2024-01-22 PROCEDURE — 80048 BASIC METABOLIC PNL TOTAL CA: CPT | Performed by: FAMILY MEDICINE

## 2024-01-22 PROCEDURE — 97530 THERAPEUTIC ACTIVITIES: CPT

## 2024-01-22 PROCEDURE — 94760 N-INVAS EAR/PLS OXIMETRY 1: CPT

## 2024-01-22 PROCEDURE — 99232 SBSQ HOSP IP/OBS MODERATE 35: CPT | Performed by: INTERNAL MEDICINE

## 2024-01-22 PROCEDURE — 71046 X-RAY EXAM CHEST 2 VIEWS: CPT

## 2024-01-22 PROCEDURE — 85027 COMPLETE CBC AUTOMATED: CPT | Performed by: FAMILY MEDICINE

## 2024-01-22 PROCEDURE — 97116 GAIT TRAINING THERAPY: CPT

## 2024-01-22 PROCEDURE — 82948 REAGENT STRIP/BLOOD GLUCOSE: CPT

## 2024-01-22 PROCEDURE — 99232 SBSQ HOSP IP/OBS MODERATE 35: CPT | Performed by: FAMILY MEDICINE

## 2024-01-22 RX ADMIN — ENOXAPARIN SODIUM 40 MG: 40 INJECTION SUBCUTANEOUS at 08:03

## 2024-01-22 RX ADMIN — QUETIAPINE 25 MG: 25 TABLET ORAL at 22:03

## 2024-01-22 RX ADMIN — DEXAMETHASONE SODIUM PHOSPHATE 7.7 MG: 10 INJECTION, SOLUTION INTRAMUSCULAR; INTRAVENOUS at 16:57

## 2024-01-22 RX ADMIN — VENLAFAXINE 25 MG: 25 TABLET ORAL at 08:04

## 2024-01-22 RX ADMIN — CHLORHEXIDINE GLUCONATE 15 ML: 1.2 SOLUTION ORAL at 22:03

## 2024-01-22 RX ADMIN — DEXAMETHASONE SODIUM PHOSPHATE 7.7 MG: 10 INJECTION, SOLUTION INTRAMUSCULAR; INTRAVENOUS at 03:32

## 2024-01-22 RX ADMIN — MELATONIN 6 MG: at 22:03

## 2024-01-22 RX ADMIN — INSULIN LISPRO 2 UNITS: 100 INJECTION, SOLUTION INTRAVENOUS; SUBCUTANEOUS at 22:08

## 2024-01-22 RX ADMIN — TOPIRAMATE 100 MG: 100 TABLET, FILM COATED ORAL at 16:57

## 2024-01-22 RX ADMIN — INSULIN LISPRO 4 UNITS: 100 INJECTION, SOLUTION INTRAVENOUS; SUBCUTANEOUS at 11:59

## 2024-01-22 RX ADMIN — TOPIRAMATE 100 MG: 100 TABLET, FILM COATED ORAL at 08:03

## 2024-01-22 RX ADMIN — VENLAFAXINE 25 MG: 25 TABLET ORAL at 16:58

## 2024-01-22 RX ADMIN — VENLAFAXINE 25 MG: 25 TABLET ORAL at 11:59

## 2024-01-22 RX ADMIN — LAMOTRIGINE 150 MG: 100 TABLET ORAL at 08:03

## 2024-01-22 RX ADMIN — CHLORHEXIDINE GLUCONATE 15 ML: 1.2 SOLUTION ORAL at 08:04

## 2024-01-22 RX ADMIN — LAMOTRIGINE 150 MG: 100 TABLET ORAL at 22:03

## 2024-01-22 NOTE — PHYSICAL THERAPY NOTE
PHYSICAL THERAPY NOTE          Patient Name: Carla Hunt  Today's Date: 1/22/2024 01/22/24 1152   PT Last Visit   PT Visit Date 01/22/24   Note Type   Note Type Treatment   Pain Assessment   Pain Assessment Tool 0-10   Pain Score No Pain   Restrictions/Precautions   Weight Bearing Precautions Per Order No   Other Precautions Chair Alarm;Bed Alarm;Multiple lines;Telemetry;Fall Risk;O2  (8L02 via NC. no longer on contact/airborne/droplet precautions per RN)   General   Chart Reviewed Yes   Family/Caregiver Present Yes  (spouse)   Cognition   Overall Cognitive Status Impaired   Arousal/Participation Cooperative   Attention Attends with cues to redirect   Orientation Level Oriented to person;Oriented to place;Oriented to situation;Oriented to time  (general situation)   Memory Decreased short term memory;Decreased recall of recent events   Following Commands Follows one step commands without difficulty   Comments spouse states that pt's memory was intact following lobectomy in 2007 and that she is close to baseline now.   Subjective   Subjective pt feels comfortable returning home when medically stable   Bed Mobility   Supine to Sit Unable to assess   Sit to Supine Unable to assess   Transfers   Sit to Stand 5  Supervision   Stand to Sit 5  Supervision   Additional Comments c RW; x3   Ambulation/Elevation   Gait pattern Decreased foot clearance;Short stride;Excessively slow   Gait Assistance 5  Supervision   Additional items Verbal cues   Assistive Device Rolling walker   Distance 70'+100'+60'+30'  (seated vs standing rest breaks bw)   Stair Management Assistance 5  Supervision   Additional items Verbal cues   Stair Management Technique One rail R;Step to pattern;Foreward;Nonreciprocal   Number of Stairs 4   Ambulation/Elevation Additional Comments SPO2 decreased to 83% on 8L02 via NC after first 70' ambulation attempt.  required seated rest break with instruction on pursed lipped breathing to recover. SPO2 remained about 88-90 or greater remainder of session.   Balance   Static Sitting Fair +   Dynamic Sitting Fair   Static Standing Fair -   Dynamic Standing Fair -   Ambulatory Poor +   Endurance Deficit   Endurance Deficit Yes   Activity Tolerance   Activity Tolerance Patient limited by fatigue   Medical Staff Made Aware SPT MD DEN BALDERAS CM CHERYL ([facility] recomendation)   Nurse Made Aware yes   Assessment   Prognosis Good   Problem List Decreased endurance;Impaired balance;Decreased cognition   Assessment Pt agreeable to participate in PT session. Pt performed functional mobility as outlined above. Pt making progress towards goals. Educated on purchasing pulse oximetry to self monitor at home and ideally keep SPO2 over 90%. Also educated on energy conservation techniques such as pacing, prioritizing, posture, planning. Educated on pursed lipped breathing. 1 episode of hypoxemia (83% on 8L02) after first ambulation attempt and required 60+ sec to return to 90% with seated rest and pursed lipped breathing. No other episodes throughout. Pt and spouse feel comfortable with pt returning home upon dc. Spouse states between himself and daughter someone will be with her first few days at home. Recommend home 02 eval prior to dc home. Pt left seated in chair with chair alarm, call bell, phone, and all personal needs within reach. Pt will continue to benefit from skilled acute care PT to further address their functional mobility limitations.   Barriers to Discharge None  ( and daughter support upon dc;  plans to take some time off of work)   Goals   Patient Goals to go home   LTG Expiration Date 01/29/24   Long Term Goal #1 Updated mobility goals: In 14 days, pt will: 1) perform bed mobility with mod I to promote functional independence. 2) perform transfers to<>from all surfaces with mod I to promote safety 3) ambulate  200' with stable vitals and mod I with least restrictive device to promote safe return to home and community 4) negotiate 13 stairs with mod I and stable vitals to promote safe return to home. 5) improve balance grades by 1/2 to promote safety with functional mobility. 6) improve strength grades by 1/2 to promote safety with functional mobility. 7) demonstrate ability to safely manage 02 lines and self assess SPO2 to decrease fall risk.   PT Treatment Day 2   Plan   Treatment/Interventions Functional transfer training;LE strengthening/ROM;Elevations;Therapeutic exercise;Endurance training;Cognitive reorientation;Patient/family training;Equipment eval/education;Bed mobility;Gait training;Spoke to nursing;Spoke to advanced practitioner;OT   Progress Progressing toward goals   PT Frequency 2-3x/wk   Discharge Recommendation   Rehab Resource Intensity Level, PT (S)  III (Minimum Resource Intensity)   Equipment Recommended Walker   Walker Package Recommended Wheeled walker   AM-PAC Basic Mobility Inpatient   Turning in Flat Bed Without Bedrails 3   Lying on Back to Sitting on Edge of Flat Bed Without Bedrails 3   Moving Bed to Chair 3   Standing Up From Chair Using Arms 3   Walk in Room 3   Climb 3-5 Stairs With Railing 3   Basic Mobility Inpatient Raw Score 18   Basic Mobility Standardized Score 41.05   Highest Level Of Mobility   JH-HLM Goal 6: Walk 10 steps or more   JH-HLM Achieved 8: Walk 250 feet ot more   Nico Dowell, PT, DPT

## 2024-01-22 NOTE — ASSESSMENT & PLAN NOTE
Supplemental oxygen as needed -severe pathway  Actermra 1/9  CRP trending down  0.1 mgkg through 1/22  Remdesivir through 1/21  Empxaparin BID  Due to patient's immunocompromise state patient will need to have 2 COVID antigen negative results prior to dc Isolation

## 2024-01-22 NOTE — UTILIZATION REVIEW
Continued Stay Review    Date: 1-20 /21/ 22-24                           Current Patient Class: inpatient  Current Level of Care: med surg    HPI:58 y.o. female initially admitted on 1-10-24   Covid on severe pathway. Actermra 1-9/      Assessment/Plan:     1-20-24   Wean O2 from 11 L mid flow nc to 8-9 L O2 mid flow nc. Maintaining O 2 sats 90-91%.  Sitting. CRP trending down.  Continue iv rendesivir, sq lovenox bid, iv decadron bid      1-21-24  Continue to wean O2 sats if  able.  Now on 8L O2 via mid flow nc.  Maintaining O2 sat at least 91%. Resting.  Continue Iv decadron, sq lovenox. Iv remdesivir completed.      1-22-24  Continue 8-9 L O2 via mid flow nc maintaining sat at least 93%.Resting. continue iv decadron and sq lovenox.        Vital Signs:     Date/Time Temp Pulse Resp BP MAP (mmHg) SpO2 O2 Flow Rate (L/min) O2 Device   01/22/24 0759 -- -- -- -- -- -- 8 L/min Mid flow nasal cannula   01/22/24 0732 -- -- -- -- -- -- 9L/min Mid flow nasal cannula   01/22/24 07:06:43 97.9 °F (36.6 °C) 88 -- 94/62 73 93 % -- --   01/21/24 20:47:23 98.7 °F (37.1 °C) 111   Abnormal  -- 101/68 79 90 % -- --   01/21/24 1921 -- -- -- -- -- -- 8 L/min Mid flow nasal cannula   01/21/24 15:26:53 97.7 °F (36.5 °C) 95 16 106/70 82 95 % -- --   01/21/24 1327 -- -- -- -- -- 91 % 8 L/min Mid flow nasal cannula   01/21/24 0852 -- -- -- -- -- -- -- Mid flow nasal cannula   01/21/24 0731 97.9 °F (36.6 °C) 98 -- 115/72 86 92 % -- --   01/21/24 0712 -- -- -- -- -- 91 % 8 L/min Mid flow nasal cannula   01/21/24 04:06:35 98.7 °F (37.1 °C) 76 20 102/58 73 93 % -- --   01/21/24 0046 -- -- -- -- -- -- 8 L/min Mid flow nasal cannula   01/20/24 2136 -- -- -- -- -- -- 8 L/min Mid flow nasal cannula   01/20/24 21:03:11 98.9 °F (37.2 °C) 103 -- 126/74 91 91 % -- --   01/20/24 14:46:24 98.5 °F (36.9 °C) 105 15 87/60   Abnormal  69 90 % -- Mid flow nasal cannula   01/20/24 1348 -- -- -- -- -- -- 9 L/min Mid flow nasal cannula   01/20/24 11:32:40  98.1 °F (36.7 °C) 104 -- 120/74 89 91 % -- --   01/20/24 0740 -- 98 18 -- -- 94 % 9 L/min Mid flow nasal cannula   01/20/24 07:39:29 99.1 °F (37.3 °C) 101 16 105/68 80 95 % 11 L/min Mid flow nasal cannula   01/20/24 0200 -- -- -- -- -- -- 10 L/min Mid flow nasal cannula           Pertinent Labs/Diagnostic Results:       Results from last 7 days   Lab Units 01/22/24  0618 01/21/24  0505 01/20/24  0426 01/19/24  0509 01/18/24  0949   WBC Thousand/uL 10.38* 7.83 7.95 9.33 10.77*   HEMOGLOBIN g/dL 12.6 12.2 11.7 12.4 11.7   HEMATOCRIT % 39.6 37.9 37.5 39.8 35.9   PLATELETS Thousands/uL 244 230 256 271 269   BANDS PCT % 5  --   --   --  1         Results from last 7 days   Lab Units 01/22/24  0618 01/21/24  0505 01/20/24  0426 01/19/24  0509 01/18/24  1322 01/17/24  0454 01/16/24  1412 01/16/24  0445   SODIUM mmol/L 140 143 140 142 144 145   < > 149*   POTASSIUM mmol/L 4.7 4.4 4.2 4.0 4.6 3.8   < > 3.3*   CHLORIDE mmol/L 107 109* 106 108 106 112*   < > 116*   CO2 mmol/L 27 28 29 26 26 25   < > 24   ANION GAP mmol/L 6 6 5 8 12 8   < > 9   BUN mg/dL 34* 28* 28* 31* 32* 18   < > 15   CREATININE mg/dL 1.05 1.00 0.91 1.05 1.05 1.04   < > 0.93   EGFR ml/min/1.73sq m 59 62 70 59 59 59   < > 68   CALCIUM mg/dL 9.7 9.5 9.4 9.4 9.9 8.7   < > 8.8   MAGNESIUM mg/dL  --   --   --   --   --  2.3  --  2.4   PHOSPHORUS mg/dL  --   --   --   --   --  2.6*  --  2.4*    < > = values in this interval not displayed.     Results from last 7 days   Lab Units 01/21/24  0505 01/20/24  0426 01/19/24  0509 01/18/24  1322   AST U/L 25 35 33 42*   ALT U/L 48 52 54* 56*   ALK PHOS U/L 89 99 109* 111*   TOTAL PROTEIN g/dL 5.3* 5.5* 5.8* 5.8*   ALBUMIN g/dL 3.4* 3.4* 3.5 3.5   TOTAL BILIRUBIN mg/dL 0.57 0.53 0.58 0.57     Results from last 7 days   Lab Units 01/22/24  1035 01/22/24  0557 01/21/24  2048 01/21/24  1605 01/21/24  1107 01/21/24  0620 01/20/24  2101 01/20/24  1610 01/20/24  1123 01/20/24  0732 01/19/24  2119 01/19/24  1548   POC GLUCOSE  mg/dl 211* 132 242* 101 135 116 162* 115 144* 136 229* 144*     Results from last 7 days   Lab Units 01/22/24  0618 01/21/24  0505 01/20/24  0426 01/19/24  0509 01/18/24  1322 01/17/24  0454 01/16/24  1412 01/16/24  0445   GLUCOSE RANDOM mg/dL 139 103 103 127 181* 99 103 126     Results from last 7 days   Lab Units 01/16/24  1412   OSMOLALITY, SERUM mmol/*     Results from last 7 days   Lab Units 01/18/24  1322   PROTIME seconds 15.1*   INR  1.21*         Results from last 7 days   Lab Units 01/16/24  0445   PROCALCITONIN ng/ml 0.14     Results from last 7 days   Lab Units 01/16/24  1412   OSMOLALITY, SERUM mmol/*   OSMO UR mmol/     Results from last 7 days   Lab Units 01/16/24  1412   CLARITY UA  Clear   COLOR UA  Light Yellow   SPEC GRAV UA  1.010   PH UA  7.0   GLUCOSE UA mg/dl Negative   KETONES UA mg/dl Negative   BLOOD UA  Negative   PROTEIN UA mg/dl Trace*   NITRITE UA  Negative   BILIRUBIN UA  Negative   UROBILINOGEN UA (BE) mg/dl <2.0   LEUKOCYTES UA  Negative   WBC UA /hpf 2-4*   RBC UA /hpf 1-2   BACTERIA UA /hpf None Seen   EPITHELIAL CELLS WET PREP /hpf Occasional   SODIUM UR  105     Results from last 7 days   Lab Units 01/15/24  1544   GRAM STAIN RESULT  Rare Mononuclear Cells  No organisms seen       Results from last 7 days   Lab Units 01/15/24  1546 01/15/24  1544   APPEARANCE CSF   --  clear, colorless   TUBE NUM CSF   --  4   WBC CSF /uL  --  1   XANTHOCHROMIA   --  No   MONOCYTES % (CSF) %  --  88   GLUCOSE CSF mg/dL  --  113*   PROTEIN CSF mg/dL  --  50*   RBC CSF uL  --  25*   CSF CULTURE  No growth  --        Scheduled Medications:    chlorhexidine, 15 mL, Mouth/Throat, Q12H SARTHAK  dexamethasone, 0.1 mg/kg, Intravenous, Q12H  enoxaparin, 40 mg, Subcutaneous, Daily  insulin lispro, 2-12 Units, Subcutaneous, TID With Meals  insulin lispro, 2-12 Units, Subcutaneous, HS  lamoTRIgine, 150 mg, Oral, BID  melatonin, 6 mg, Oral, HS  polyethylene glycol, 17 g, Oral,  Daily  QUEtiapine, 25 mg, Oral, HS  senna-docusate sodium, 2 tablet, Oral, BID  topiramate, 100 mg, Oral, BID  venlafaxine, 25 mg, Oral, TID With Meals      Continuous IV Infusions:     PRN Meds:  acetaminophen, 650 mg, Oral, Q6H PRN  bisacodyl, 10 mg, Rectal, Daily PRN  ondansetron, 4 mg, Intravenous, Q6H PRN        Discharge Plan: to be determined     Network Utilization Review Department  ATTENTION: Please call with any questions or concerns to [phone] and carefully listen to the prompts so that you are directed to the right person. All voicemails are confidential.   For Discharge needs, contact Care Management DC Support Team at [phone] opt. [phone]  Send all requests for admission clinical reviews, approved or denied determinations and any other requests to dedicated fax number below belonging to the [place] where the patient is receiving treatment. List of dedicated fax numbers for the Facilities:  FACILITY NAME UR FAX NUMBER   ADMISSION DENIALS (Administrative/Medical Necessity) [phone]   DISCHARGE SUPPORT TEAM (NETWORK) [phone]   PARENT CHILD HEALTH (Maternity/NICU/Pediatrics) [phone]   [facility] [phone]   [facility] [phone]   [facility] [phone]   [facility] [phone]   [facility] [phone]   [facility] [phone]   [facility] [phone]   [facility] [phone]   [facility] [phone]   [facility] [phone]   [facility] [phone]

## 2024-01-22 NOTE — ASSESSMENT & PLAN NOTE
Lab Results   Component Value Date    EGFR 59 01/22/2024    EGFR 62 01/21/2024    EGFR 70 01/20/2024    CREATININE 1.05 01/22/2024    CREATININE 1.00 01/21/2024    CREATININE 0.91 01/20/2024     Monitor BMP

## 2024-01-22 NOTE — RESTORATIVE TECHNICIAN NOTE
Restorative Technician Note      Patient Name: Carla Hunt     Note Type: Mobility  Patient Position Upon Consult: Supine  Activity Performed: Ambulated; Stood; Dangled  Assistive Device: Roller walker; Other (Comment) (Assist x1 with chair follow. NC O2 on 8L)  Education Provided: Yes  Patient Position at End of Consult: Bedside chair; Bed/Chair alarm activated; All needs within reach    Héctor LAYNE, Restorative Technician,

## 2024-01-22 NOTE — PROGRESS NOTES
[facility]  Progress Note  Name: Carla Hunt I  MRN: [id number]  Unit/Bed#: [facility] 726-01 I Date of Admission: 1/10/2024   Date of Service: 1/22/2024 I Hospital Day: 12    Assessment/Plan   * Encephalopathy acute  Assessment & Plan  Patient transferred from ICU to stepdown 2  Patient had lumbar puncture done culture negative  Negative HSV PCR  Follow-up flow cytometry, cytology, paraneoplastic panel  Melatonin and Seroquel started  Significantly improved      Seizure disorder (HCC)  Assessment & Plan  S/p Temporal lobectomy in 2007  Continue home Lamictal and Topamax    Acute respiratory failure with hypoxia (HCC)  Assessment & Plan  Patient weaned down to mid flow 8 L  Wean as tolerated  Procalcitonin 0.6-0.3-0.15  Ceftriaxone finished 1/15  Patient diagnosed with severe COVID please see management below      COVID  Assessment & Plan  Supplemental oxygen as needed -severe pathway  Actermra 1/9  CRP trending down  0.1 mgkg through 1/22  Remdesivir through 1/21  Empxaparin BID  Due to patient's immunocompromise state patient will need to have 2 COVID antigen negative results prior to dc Isolation       Dysphagia  Assessment & Plan  Speech following    Teto marginal zone B-cell lymphoma (HCC)  Assessment & Plan  Outpatient f/u  maintenance rituxan hycela until 5/2024 plan     Stage 3b chronic kidney disease (CKD) ([facility])  Assessment & Plan  Lab Results   Component Value Date    EGFR 59 01/22/2024    EGFR 62 01/21/2024    EGFR 70 01/20/2024    CREATININE 1.05 01/22/2024    CREATININE 1.00 01/21/2024    CREATININE 0.91 01/20/2024     Monitor BMP    Anxiety state  Assessment & Plan  Home effexor                VTE Pharmacologic Prophylaxis: VTE Score: 11 Moderate Risk (Score 3-4) - Pharmacological DVT Prophylaxis Ordered: enoxaparin (Lovenox).    Mobility:   Basic Mobility Inpatient Raw Score: 18  -HLM Goal: 6: Walk 10 steps or more  -HLM Achieved: 6: Walk 10 steps or  more  HLM Goal achieved. Continue to encourage appropriate mobility.    Patient Centered Rounds: I performed bedside rounds with nursing staff today.   Discussions with Specialists or Other Care Team Provider: Pulmonology to optimize management for acute respiratory failure.    Education and Discussions with Family / Patient: Updated  () at bedside.    Total Time Spent on Date of Encounter in care of patient: 45 mins. This time was spent on one or more of the following: performing physical exam; counseling and coordination of care; obtaining or reviewing history; documenting in the medical record; reviewing/ordering tests, medications or procedures; communicating with other healthcare professionals and discussing with patient's family/caregivers.    Current Length of Stay: 12 day(s)  Current Patient Status: Inpatient   Certification Statement: The patient will continue to require additional inpatient hospital stay due to acute respiratory failure second to severe COVID  Discharge Plan: Anticipate discharge in 48 hrs to pending PT OT reevaluation    Code Status: Level 1 - Full Code    Subjective:   Is a very pleasant 58-year-old female was seen evaluated today at bedside.  Patient continues to use her incentive spirometry and ambulate.  Patient is highly motivated to get better.    Objective:     Vitals:   Temp (24hrs), Av.1 °F (36.7 °C), Min:97.7 °F (36.5 °C), Max:98.7 °F (37.1 °C)    Temp:  [97.7 °F (36.5 °C)-98.7 °F (37.1 °C)] 97.9 °F (36.6 °C)  HR:  [] 88  Resp:  [16] 16  BP: ()/(62-70) 94/62  SpO2:  [90 %-95 %] 93 %  Body mass index is 25.78 kg/m².     Input and Output Summary (last 24 hours):     Intake/Output Summary (Last 24 hours) at 2024 1020  Last data filed at 2024 0801  Gross per 24 hour   Intake 500 ml   Output --   Net 500 ml       Physical Exam:   Physical Exam  Vitals reviewed.   Constitutional:       General: She is not in acute distress.      Appearance: Normal appearance. She is not ill-appearing.   HENT:      Head: Normocephalic and atraumatic.      Right Ear: External ear normal.      Left Ear: External ear normal.      Nose: Nose normal.   Eyes:      Extraocular Movements: Extraocular movements intact.      Conjunctiva/sclera: Conjunctivae normal.   Cardiovascular:      Rate and Rhythm: Normal rate and regular rhythm.      Pulses: Normal pulses.      Heart sounds: Normal heart sounds.   Pulmonary:      Effort: Pulmonary effort is normal.      Comments: Decreased breath sounds  Nasal cannula in place  Abdominal:      General: Abdomen is flat. Bowel sounds are normal.      Palpations: Abdomen is soft.   Musculoskeletal:         General: No deformity or signs of injury.      Cervical back: Normal range of motion.   Skin:     General: Skin is warm and dry.   Neurological:      General: No focal deficit present.      Mental Status: She is alert. Mental status is at baseline.   Psychiatric:         Mood and Affect: Mood normal.         Behavior: Behavior normal.        Additional Data:     Labs:  Results from last 7 days   Lab Units 01/22/24  0618   WBC Thousand/uL 10.38*   HEMOGLOBIN g/dL 12.6   HEMATOCRIT % 39.6   PLATELETS Thousands/uL 244   BANDS PCT % 5   LYMPHO PCT % 0*   MONO PCT % 3*   EOS PCT % 0     Results from last 7 days   Lab Units 01/22/24  0618 01/21/24  0505   SODIUM mmol/L 140 143   POTASSIUM mmol/L 4.7 4.4   CHLORIDE mmol/L 107 109*   CO2 mmol/L 27 28   BUN mg/dL 34* 28*   CREATININE mg/dL 1.05 1.00   ANION GAP mmol/L 6 6   CALCIUM mg/dL 9.7 9.5   ALBUMIN g/dL  --  3.4*   TOTAL BILIRUBIN mg/dL  --  0.57   ALK PHOS U/L  --  89   ALT U/L  --  48   AST U/L  --  25   GLUCOSE RANDOM mg/dL 139 103     Results from last 7 days   Lab Units 01/18/24  1322   INR  1.21*     Results from last 7 days   Lab Units 01/22/24  0557 01/21/24  2048 01/21/24  1605 01/21/24  1107 01/21/24  0620 01/20/24  2101 01/20/24  1610 01/20/24  1123 01/20/24  0732  01/19/24  2119 01/19/24  1548 01/19/24  1058   POC GLUCOSE mg/dl 132 242* 101 135 116 162* 115 144* 136 229* 144* 147*         Results from last 7 days   Lab Units 01/16/24  0445   PROCALCITONIN ng/ml 0.14       Lines/Drains:  Invasive Devices       Peripheral Intravenous Line  Duration             Peripheral IV 01/20/24 Left Antecubital 2 days                          Imaging: No pertinent imaging reviewed.    Recent Cultures (last 7 days):   Results from last 7 days   Lab Units 01/15/24  1544   GRAM STAIN RESULT  Rare Mononuclear Cells  No organisms seen       Last 24 Hours Medication List:   Current Facility-Administered Medications   Medication Dose Route Frequency Provider Last Rate    acetaminophen  650 mg Oral Q6H PRN Laura Rodriguez, DO      bisacodyl  10 mg Rectal Daily PRN Laura Rodriguez, DO      chlorhexidine  15 mL Mouth/Throat Q12H SARTHAK Laura Rodriguez, DO      dexamethasone  0.1 mg/kg Intravenous Q12H Lauar Rodriguez, DO      enoxaparin  40 mg Subcutaneous Daily Laura Rodriguez, DO      insulin lispro  2-12 Units Subcutaneous TID With Meals Laura Rodriguez, DO      insulin lispro  2-12 Units Subcutaneous HS Laura Rodriguez, DO      lamoTRIgine  150 mg Oral BID Laura Rodriguez, DO      melatonin  6 mg Oral HS Laura Rodriguez, DO      ondansetron  4 mg Intravenous Q6H PRN Laura Rodriguez, DO      polyethylene glycol  17 g Oral Daily Laura Rodriguez, DO      QUEtiapine  25 mg Oral HS Laura Rodriguez, DO      senna-docusate sodium  2 tablet Oral BID Laura Rodriguez, DO      topiramate  100 mg Oral BID Laura Rodriguez, DO      venlafaxine  25 mg Oral TID With Meals Laura Rodriguez, DO          Today, Patient Was Seen By: Jerardo Johnson MD    **Please Note: This note may have been constructed using a voice recognition system.**

## 2024-01-22 NOTE — PLAN OF CARE
Problem: PHYSICAL THERAPY ADULT  Goal: Performs mobility at highest level of function for planned discharge setting.  See evaluation for individualized goals.  Description:    Equipment Recommended:  (none)       See flowsheet documentation for full assessment, interventions and recommendations.  Outcome: Progressing  Note: Prognosis: Good  Problem List: Decreased endurance, Impaired balance, Decreased cognition  Assessment: Pt agreeable to participate in PT session. Pt performed functional mobility as outlined above. Pt making progress towards goals. Educated on purchasing pulse oximetry to self monitor at home and ideally keep SPO2 over 90%. Also educated on energy conservation techniques such as pacing, prioritizing, posture, planning. Educated on pursed lipped breathing. 1 episode of hypoxemia (83% on 8L02) after first ambulation attempt and required 60+ sec to return to 90% with seated rest and pursed lipped breathing. No other episodes throughout. Pt and spouse feel comfortable with pt returning home upon dc. Spouse states between himself and daughter someone will be with her first few days at home. Recommend home 02 eval prior to dc home. Pt left seated in chair with chair alarm, call bell, phone, and all personal needs within reach. Pt will continue to benefit from skilled acute care PT to further address their functional mobility limitations.  Barriers to Discharge: None ( and daughter support upon dc;  plans to take some time off of work)     Rehab Resource Intensity Level, PT: (S) III (Minimum Resource Intensity)    See flowsheet documentation for full assessment.

## 2024-01-22 NOTE — PROGRESS NOTES
Progress Note - Infectious Disease   Carla Hunt 58 y.o. female MRN: [id number]  Unit/Bed#: [facility] 726-01 Encounter: [id number]      Impression/Recommendations:  SIRS.  If patient does indeed had seizure, SIRS may be all secondary to it.   Consideration for COVID and bacterial pneumonia contributing to this.  Patient is overall clinically proved and she remains systemically nontoxic.  Temperature is down.  WBC normalized.  Admission blood cultures have no growth.  Patient completed antibiotic course.  She completed treatment for COVID.  No further anti-infectives.    Monitor temperature/WBC.     Encephalopathy.  Head CT and MRI did not show acute CVA or any mass lesion.  There is no seizure on EEG monitoring now but it is unclear whether patient may have a seizure during recent hospitalization at [facility] prior to transfer.  Clinical picture was never suggestive of bacterial meningitis but HSV encephalitis was possible.  However, patient had 2 lumbar punctures, both with no evidence of CSF inflammation and negative HSV PCR.  Patient had been on IV acyclovir but this was discontinued.  Mental status is back to normal.  No further anti-infective needed for this.  Monitor mental status.     Severe COVID, with acute hypoxic respiratory failure.  Patient is currently on remdesivir and dexamethasone and did receive 1 dose of Tocilizumab.  CXR with relatively mild opacities.  With elevated procalcitonin, there is concern of possible concurrent bacterial pneumonia.  Patient did receive initial rounds of COVID vaccination but not the new COVID vaccination available this past fall.  Procalcitonin decreasing.  Patient completed antibiotic course for bacterial pneumonia.  She completed remdesivir course earlier and is completing dexamethasone today.  Complete dexamethasone course today.  Observe off further antibiotic/antiviral.  Monitor respiratory symptoms and O2 saturation.     Acute hypoxic respiratory  failure, likely multifactorial, with COVID contributing.  Respiratory status improving, with O2 support slowly weaning.  O2 support per critical service.     CKD.  Creatinine improved from admission.  Antibiotic and antiviral dosages adjusted accordingly.  Monitor creatinine.     Seizure disorder, status post temporal lobe resection in .  There was no active seizure on EEG monitoring but it is unclear whether patient may have had seizure earlier.  Neurology follow-up.     B-cell lymphoma, on chemotherapy.  Patient was leukopenic admission but not neutropenic.  WBC is now in normal range.  Monitor WBC/ANC.     Discussed with patient in detail regarding the above plan.    With no further active infection, I will sign off.  Thank you for the consultation.  Please do not hesitate to reconsult us for any further questions or issues.     Antibiotics:  Off antibiotic     Subjective:  Patient feels well.  She is awake and alert, with no further confusion.  No headache.  Dyspnea continues to improve slowly.  Cough minimal now.  Patient is able to ambulate in room with minimal dyspnea.  Temperature stays down.  No chills.  No diarrhea.         Objective:  Vitals:  Temp:  [97.7 °F (36.5 °C)-98.7 °F (37.1 °C)] 97.9 °F (36.6 °C)  HR:  [] 88  Resp:  [16] 16  BP: ()/(62-70) 94/62  SpO2:  [90 %-95 %] 93 %  Temp (24hrs), Av.1 °F (36.7 °C), Min:97.7 °F (36.5 °C), Max:98.7 °F (37.1 °C)  Current: Temperature: 97.9 °F (36.6 °C)    Physical Exam:     General: Awake, alert, cooperative, no distress.   Neck:  Supple. No mass.  No lymphadenopathy.   Lungs: Expansion symmetric, no rales, no wheezing, respirations unlabored.   Heart:  Regular rate and rhythm, S1 and S2 normal, no murmur.   Abdomen: Soft, nondistended, non-tender, bowel sounds active all four quadrants, no masses, no organomegaly.   Extremities: No edema. No erythema/warmth. No ulcer. Nontender to palpation.   Skin:  No rash.   Neuro: Moves all  extremities.     Invasive Devices       Peripheral Intravenous Line  Duration             Peripheral IV 01/20/24 Left Antecubital 2 days                    Labs studies:   I have personally reviewed pertinent labs.  Results from last 7 days   Lab Units 01/22/24  0618 01/21/24  0505 01/20/24  0426 01/19/24  0509   POTASSIUM mmol/L 4.7 4.4 4.2 4.0   CHLORIDE mmol/L 107 109* 106 108   CO2 mmol/L 27 28 29 26   BUN mg/dL 34* 28* 28* 31*   CREATININE mg/dL 1.05 1.00 0.91 1.05   EGFR ml/min/1.73sq m 59 62 70 59   CALCIUM mg/dL 9.7 9.5 9.4 9.4   AST U/L  --  25 35 33   ALT U/L  --  48 52 54*   ALK PHOS U/L  --  89 99 109*     Results from last 7 days   Lab Units 01/22/24  0618 01/21/24  0505 01/20/24  0426   WBC Thousand/uL 10.38* 7.83 7.95   HEMOGLOBIN g/dL 12.6 12.2 11.7   PLATELETS Thousands/uL 244 230 256     Results from last 7 days   Lab Units 01/15/24  1544   GRAM STAIN RESULT  Rare Mononuclear Cells  No organisms seen       Imaging Studies:   I have personally reviewed pertinent imaging study reports and images in PACS.    EKG, Pathology, and Other Studies:   I have personally reviewed pertinent reports.

## 2024-01-23 LAB
ALBUMIN SERPL BCP-MCNC: 3.4 G/DL (ref 3.5–5)
ALP SERPL-CCNC: 87 U/L (ref 34–104)
ALT SERPL W P-5'-P-CCNC: 33 U/L (ref 7–52)
ANION GAP SERPL CALCULATED.3IONS-SCNC: 6 MMOL/L
AST SERPL W P-5'-P-CCNC: 18 U/L (ref 13–39)
BILIRUB SERPL-MCNC: 0.58 MG/DL (ref 0.2–1)
BUN SERPL-MCNC: 34 MG/DL (ref 5–25)
CALCIUM ALBUM COR SERPL-MCNC: 10 MG/DL (ref 8.3–10.1)
CALCIUM SERPL-MCNC: 9.5 MG/DL (ref 8.4–10.2)
CHLORIDE SERPL-SCNC: 107 MMOL/L (ref 96–108)
CO2 SERPL-SCNC: 27 MMOL/L (ref 21–32)
CREAT SERPL-MCNC: 1.08 MG/DL (ref 0.6–1.3)
ERYTHROCYTE [DISTWIDTH] IN BLOOD BY AUTOMATED COUNT: 17.3 % (ref 11.6–15.1)
GFR SERPL CREATININE-BSD FRML MDRD: 57 ML/MIN/1.73SQ M
GLUCOSE SERPL-MCNC: 101 MG/DL (ref 65–140)
GLUCOSE SERPL-MCNC: 105 MG/DL (ref 65–140)
GLUCOSE SERPL-MCNC: 121 MG/DL (ref 65–140)
GLUCOSE SERPL-MCNC: 125 MG/DL (ref 65–140)
GLUCOSE SERPL-MCNC: 156 MG/DL (ref 65–140)
HCT VFR BLD AUTO: 38.1 % (ref 34.8–46.1)
HGB BLD-MCNC: 11.9 G/DL (ref 11.5–15.4)
MCH RBC QN AUTO: 29.4 PG (ref 26.8–34.3)
MCHC RBC AUTO-ENTMCNC: 31.2 G/DL (ref 31.4–37.4)
MCV RBC AUTO: 94 FL (ref 82–98)
NRBC BLD AUTO-RTO: 0 /100 WBCS
PLATELET # BLD AUTO: 228 THOUSANDS/UL (ref 149–390)
PMV BLD AUTO: 11.7 FL (ref 8.9–12.7)
POTASSIUM SERPL-SCNC: 4.7 MMOL/L (ref 3.5–5.3)
PROT SERPL-MCNC: 5.5 G/DL (ref 6.4–8.4)
RBC # BLD AUTO: 4.05 MILLION/UL (ref 3.81–5.12)
SARS-COV-2 AG UPPER RESP QL IA: NEGATIVE
SARS-COV-2 AG UPPER RESP QL IA: NORMAL
SODIUM SERPL-SCNC: 140 MMOL/L (ref 135–147)
WBC # BLD AUTO: 7.94 THOUSAND/UL (ref 4.31–10.16)

## 2024-01-23 PROCEDURE — 82948 REAGENT STRIP/BLOOD GLUCOSE: CPT

## 2024-01-23 PROCEDURE — 94664 DEMO&/EVAL PT USE INHALER: CPT

## 2024-01-23 PROCEDURE — 85027 COMPLETE CBC AUTOMATED: CPT | Performed by: FAMILY MEDICINE

## 2024-01-23 PROCEDURE — 99232 SBSQ HOSP IP/OBS MODERATE 35: CPT | Performed by: STUDENT IN AN ORGANIZED HEALTH CARE EDUCATION/TRAINING PROGRAM

## 2024-01-23 PROCEDURE — 80053 COMPREHEN METABOLIC PANEL: CPT | Performed by: FAMILY MEDICINE

## 2024-01-23 RX ADMIN — POLYETHYLENE GLYCOL 3350 17 G: 17 POWDER, FOR SOLUTION ORAL at 08:08

## 2024-01-23 RX ADMIN — CHLORHEXIDINE GLUCONATE 15 ML: 1.2 SOLUTION ORAL at 08:08

## 2024-01-23 RX ADMIN — SENNOSIDES, DOCUSATE SODIUM 2 TABLET: 8.6; 5 TABLET ORAL at 16:27

## 2024-01-23 RX ADMIN — INSULIN LISPRO 2 UNITS: 100 INJECTION, SOLUTION INTRAVENOUS; SUBCUTANEOUS at 16:27

## 2024-01-23 RX ADMIN — VENLAFAXINE 25 MG: 25 TABLET ORAL at 08:09

## 2024-01-23 RX ADMIN — TOPIRAMATE 100 MG: 100 TABLET, FILM COATED ORAL at 08:09

## 2024-01-23 RX ADMIN — CHLORHEXIDINE GLUCONATE 15 ML: 1.2 SOLUTION ORAL at 22:10

## 2024-01-23 RX ADMIN — VENLAFAXINE 25 MG: 25 TABLET ORAL at 11:25

## 2024-01-23 RX ADMIN — ENOXAPARIN SODIUM 40 MG: 40 INJECTION SUBCUTANEOUS at 08:08

## 2024-01-23 RX ADMIN — VENLAFAXINE 25 MG: 25 TABLET ORAL at 16:27

## 2024-01-23 RX ADMIN — TOPIRAMATE 100 MG: 100 TABLET, FILM COATED ORAL at 16:26

## 2024-01-23 RX ADMIN — LAMOTRIGINE 150 MG: 100 TABLET ORAL at 08:08

## 2024-01-23 RX ADMIN — MELATONIN 6 MG: at 22:09

## 2024-01-23 RX ADMIN — LAMOTRIGINE 150 MG: 100 TABLET ORAL at 22:09

## 2024-01-23 RX ADMIN — SODIUM CHLORIDE 500 ML: 0.9 INJECTION, SOLUTION INTRAVENOUS at 08:33

## 2024-01-23 RX ADMIN — QUETIAPINE 25 MG: 25 TABLET ORAL at 22:10

## 2024-01-23 NOTE — ASSESSMENT & PLAN NOTE
Initial concern of viral encephalitis  Received IV Aciclovir, discontinued after 2 negative lumbar punctures  Body fluid cytology negative for malignancy, leukemia/lymphoma flow cytometry negative   Continue Seroquel and melatonin  Resolved

## 2024-01-23 NOTE — ASSESSMENT & PLAN NOTE
Started on severe pathway  S/p Actermra 1/9  S/p dexamethasone and remdesivir  Due to patient's immunocompromise state patient will need to have 2 COVID antigen negative results prior to dc Isolation.  Negative results, okay to DC isolation  Wean O2 as tolerated.  Home O2 eval ordered

## 2024-01-23 NOTE — PROGRESS NOTES
[facility]  Progress Note  Name: Carla Hunt I  MRN: [id number]  Unit/Bed#: [facility] 726-01 I Date of Admission: 1/10/2024   Date of Service: 1/23/2024 I Hospital Day: 13    Assessment/Plan   Seizure disorder (HCC)  Assessment & Plan  S/p Temporal lobectomy in 2007  Continue home Lamictal and Topamax    Dysphagia  Assessment & Plan  Dysphagia noted on admission, resolved.  Evaluation by speech and okay for regular diet    Teto marginal zone B-cell lymphoma (HCC)  Assessment & Plan  Outpatient f/u    Acute respiratory failure with hypoxia ([facility])  Assessment & Plan  Secondary to severe COVID and multifocal pna  Currently being weaned- down to 4-5 L from 8L  Completed 7 days of IV Ceftriaxone  Chest x-ray 1/22 with improved variation of the bilateral airspace opacities  Walking pulse ox ordered  Respiratory protocol.  Incentive spirometer      Stage 3b chronic kidney disease (CKD) ([facility])  Assessment & Plan  Lab Results   Component Value Date    EGFR 57 01/23/2024    EGFR 59 01/22/2024    EGFR 62 01/21/2024    CREATININE 1.08 01/23/2024    CREATININE 1.05 01/22/2024    CREATININE 1.00 01/21/2024     Monitor BMP    COVID  Assessment & Plan  Started on severe pathway  S/p Actermra 1/9  S/p dexamethasone and remdesivir  Due to patient's immunocompromise state patient will need to have 2 COVID antigen negative results prior to dc Isolation.  Negative results, okay to DC isolation  Wean O2 as tolerated.  Home O2 eval ordered      Anxiety state  Assessment & Plan  Continue home effexor     * Encephalopathy acute  Assessment & Plan  Initial concern of viral encephalitis  Received IV Aciclovir, discontinued after 2 negative lumbar punctures  Body fluid cytology negative for malignancy, leukemia/lymphoma flow cytometry negative   Continue Seroquel and melatonin  Resolved                 VTE Pharmacologic Prophylaxis: VTE Score: 11 High Risk (Score >/= 5) - Pharmacological DVT Prophylaxis  Ordered: enoxaparin (Lovenox). Sequential Compression Devices Ordered.    Mobility:   Basic Mobility Inpatient Raw Score: 18  JH-HLM Goal: 6: Walk 10 steps or more  JH-HLM Achieved: 7: Walk 25 feet or more  HLM Goal achieved. Continue to encourage appropriate mobility.    Patient Centered Rounds: I performed bedside rounds with nursing staff today.   Discussions with Specialists or Other Care Team Provider: IAN    Education and Discussions with Family / Patient: Updated  () at bedside.    Total Time Spent on Date of Encounter in care of patient: 35 mins. This time was spent on one or more of the following: performing physical exam; counseling and coordination of care; obtaining or reviewing history; documenting in the medical record; reviewing/ordering tests, medications or procedures; communicating with other healthcare professionals and discussing with patient's family/caregivers.    Current Length of Stay: 13 day(s)  Current Patient Status: Inpatient   Certification Statement: The patient will continue to require additional inpatient hospital stay due to Home o2 eval  Discharge Plan: Anticipate discharge tomorrow to home.    Code Status: Level 1 - Full Code    Subjective:   No events overnight.  Patient reports feeling well this morning.  Has no complaints.    Objective:     Vitals:   Temp (24hrs), Av.3 °F (36.8 °C), Min:97.5 °F (36.4 °C), Max:98.8 °F (37.1 °C)    Temp:  [97.5 °F (36.4 °C)-98.8 °F (37.1 °C)] 98.8 °F (37.1 °C)  HR:  [] 112  Resp:  [16] 16  BP: ()/(53-68) 118/68  SpO2:  [86 %-94 %] 92 %  Body mass index is 25.78 kg/m².     Input and Output Summary (last 24 hours):     Intake/Output Summary (Last 24 hours) at 2024 1523  Last data filed at 2024 1301  Gross per 24 hour   Intake 900 ml   Output --   Net 900 ml       Physical Exam:   Physical Exam  Vitals and nursing note reviewed.   Constitutional:       General: She is not in acute distress.      Appearance: She is well-developed.   HENT:      Head: Normocephalic and atraumatic.   Eyes:      Conjunctiva/sclera: Conjunctivae normal.   Cardiovascular:      Rate and Rhythm: Normal rate and regular rhythm.      Heart sounds: No murmur heard.  Pulmonary:      Effort: Pulmonary effort is normal. No respiratory distress.      Breath sounds: Normal breath sounds.   Musculoskeletal:         General: No swelling.      Cervical back: Neck supple.   Skin:     General: Skin is warm and dry.   Neurological:      Mental Status: She is alert.   Psychiatric:         Mood and Affect: Mood normal.         Behavior: Behavior normal.          Additional Data:     Labs:  Results from last 7 days   Lab Units 01/23/24  0504 01/22/24  0618   WBC Thousand/uL 7.94 10.38*   HEMOGLOBIN g/dL 11.9 12.6   HEMATOCRIT % 38.1 39.6   PLATELETS Thousands/uL 228 244   BANDS PCT %  --  5   LYMPHO PCT %  --  0*   MONO PCT %  --  3*   EOS PCT %  --  0     Results from last 7 days   Lab Units 01/23/24  0504   SODIUM mmol/L 140   POTASSIUM mmol/L 4.7   CHLORIDE mmol/L 107   CO2 mmol/L 27   BUN mg/dL 34*   CREATININE mg/dL 1.08   ANION GAP mmol/L 6   CALCIUM mg/dL 9.5   ALBUMIN g/dL 3.4*   TOTAL BILIRUBIN mg/dL 0.58   ALK PHOS U/L 87   ALT U/L 33   AST U/L 18   GLUCOSE RANDOM mg/dL 101     Results from last 7 days   Lab Units 01/18/24  1322   INR  1.21*     Results from last 7 days   Lab Units 01/23/24  1053 01/23/24  0609 01/22/24  2133 01/22/24  1605 01/22/24  1035 01/22/24  0557 01/21/24  2048 01/21/24  1605 01/21/24  1107 01/21/24  0620 01/20/24  2101 01/20/24  1610   POC GLUCOSE mg/dl 121 105 175* 82 211* 132 242* 101 135 116 162* 115               Lines/Drains:  Invasive Devices       Peripheral Intravenous Line  Duration             Peripheral IV 01/20/24 Left Antecubital 3 days                          Imaging: Reviewed radiology reports from this admission including: chest xray    Recent Cultures (last 7 days):         Last 24 Hours  Medication List:   Current Facility-Administered Medications   Medication Dose Route Frequency Provider Last Rate    acetaminophen  650 mg Oral Q6H PRN Laura Rodriguez, DO      bisacodyl  10 mg Rectal Daily PRN Laura Rodriguez, DO      chlorhexidine  15 mL Mouth/Throat Q12H SARTHAK Laura Rodriguez, DO      enoxaparin  40 mg Subcutaneous Daily Laura Rodriguez, DO      insulin lispro  2-12 Units Subcutaneous TID With Meals Laura Rodriguez, DO      insulin lispro  2-12 Units Subcutaneous HS Laura Rodriguez, DO      lamoTRIgine  150 mg Oral BID Laura Rodriguez, DO      melatonin  6 mg Oral HS Laura Rodriguez, DO      ondansetron  4 mg Intravenous Q6H PRN Laura Rodriguez, DO      polyethylene glycol  17 g Oral Daily Laura Rodriguez, DO      QUEtiapine  25 mg Oral HS Laura Rodriguez, DO      senna-docusate sodium  2 tablet Oral BID Laura Rodriguez, DO      topiramate  100 mg Oral BID Laura Rordiguez, DO      venlafaxine  25 mg Oral TID With Meals Laura Rodriguez, DO          Today, Patient Was Seen By: Ro Hamilton MD    **Please Note: This note may have been constructed using a voice recognition system.**

## 2024-01-23 NOTE — PLAN OF CARE
Problem: INFECTION - ADULT  Goal: Absence or prevention of progression during hospitalization  Description: INTERVENTIONS:  - Assess and monitor for signs and symptoms of infection  - Monitor lab/diagnostic results  - Monitor all insertion sites, i.e. indwelling lines, tubes, and drains  - Monitor endotracheal if appropriate and nasal secretions for changes in amount and color  - [place] appropriate cooling/warming therapies per order  - Administer medications as ordered  - Instruct and encourage patient and family to use good hand hygiene technique  - Identify and instruct in appropriate isolation precautions for identified infection/condition  Outcome: Progressing  Goal: Absence of fever/infection during neutropenic period  Description: INTERVENTIONS:  - Monitor WBC    Outcome: Progressing     Problem: PAIN - ADULT  Goal: Verbalizes/displays adequate comfort level or baseline comfort level  Description: Interventions:  - Encourage patient to monitor pain and request assistance  - Assess pain using appropriate pain scale  - Administer analgesics based on type and severity of pain and evaluate response  - Implement non-pharmacological measures as appropriate and evaluate response  - Consider cultural and social influences on pain and pain management  - Notify physician/advanced practitioner if interventions unsuccessful or patient reports new pain  Outcome: Progressing     Problem: Nutrition/Hydration-ADULT  Goal: Nutrient/Hydration intake appropriate for improving, restoring or maintaining nutritional needs  Description: Monitor and assess patient's nutrition/hydration status for malnutrition. Collaborate with interdisciplinary team and initiate plan and interventions as ordered.  Monitor patient's weight and dietary intake as ordered or per policy. Utilize nutrition screening tool and intervene as necessary. Determine patient's food preferences and provide high-protein, high-caloric foods as appropriate.      INTERVENTIONS:  - Monitor oral intake, urinary output, labs, and treatment plans  - Assess nutrition and hydration status and recommend course of action  - Evaluate amount of meals eaten  - Assist patient with eating if necessary   - Allow adequate time for meals  - Recommend/ encourage appropriate diets, oral nutritional supplements, and vitamin/mineral supplements  - Order, calculate, and assess calorie counts as needed  - Recommend, monitor, and adjust tube feedings and TPN/PPN based on assessed needs  - Assess need for intravenous fluids  - Provide specific nutrition/hydration education as appropriate  - Include patient/family/caregiver in decisions related to nutrition  Outcome: Progressing

## 2024-01-23 NOTE — RESTORATIVE TECHNICIAN NOTE
Restorative Technician Note      Patient Name: Carla Hunt     Note Type: Mobility  Patient Position Upon Consult: Supine  Activity Performed: Ambulated; Dangled; Stood  Assistive Device: Roller walker; Other (Comment) (Assist x1 with chair follow and NC O2 on 6L)  Education Provided: Yes  Patient Position at End of Consult: Bedside chair; All needs within reach; Bed/Chair alarm activated    Héctor LAYNE, Restorative Technician,

## 2024-01-23 NOTE — RESTORATIVE TECHNICIAN NOTE
Restorative Technician Note      Patient Name: Carla Hunt     Note Type: Mobility  Patient Position Upon Consult: Bedside chair  Activity Performed: Ambulated; Dangled; Stood  Assistive Device: Roller walker (NC O2 on 6L)  Education Provided: Yes  Patient Position at End of Consult: Bedside chair; All needs within reach; Bed/Chair alarm activated      Héctor LAYNE, Restorative Technician,

## 2024-01-23 NOTE — UTILIZATION REVIEW
Continued Stay Review    Date: 1-23-24                           Current Patient Class: inpatient  Current Level of Care: med surg    HPI:58 y.o. female initially admitted on 1-10-24       Assessment/Plan:   Patient hypotensive this am 85/53. Received iv .9% ns 500 ml bolus x1. O2 st 86% ra.   Requires 4L O2nc to maintain O2 sat at least 90% ( not yet baseline).   Continue  lovenox sq, insulin sliding scale.  Iv dexamethasone q12 hr completed    Vital Signs:     Date/Time Temp Pulse Resp BP MAP (mmHg) SpO2 Nasal Cannula O2 Flow Rate (L/min)   01/23/24 09:59:17 -- 99 -- 98/62 74 88 %   Abnormal  --   01/23/24 09:22:22 -- 103 -- 91/65 74 91 % --   01/23/24 08:39:52 -- 101 -- 99/58 72 90 % 4 L/min   01/23/24 08:07:53 -- 103 -- 87/56   Abnormal  66 86 %   Abnormal  --   01/23/24 07:36:28 97.5 °F (36.4 °C) 101 16 85/53   Abnormal  64   Abnormal  86 %   Abnormal  --   01/23/24 0710 -- -- -- -- -- -- 4 L/min       Pertinent Labs/Diagnostic Results:       Results from last 7 days   Lab Units 01/23/24  0504 01/22/24  0618 01/21/24  0505 01/20/24  0426 01/19/24  0509 01/18/24  0949   WBC Thousand/uL 7.94 10.38* 7.83 7.95 9.33 10.77*   HEMOGLOBIN g/dL 11.9 12.6 12.2 11.7 12.4 11.7   HEMATOCRIT % 38.1 39.6 37.9 37.5 39.8 35.9   PLATELETS Thousands/uL 228 244 230 256 271 269   BANDS PCT %  --  5  --   --   --  1         Results from last 7 days   Lab Units 01/23/24  0504 01/22/24  0618 01/21/24  0505 01/20/24  0426 01/19/24  0509 01/18/24  1322 01/17/24  0454   SODIUM mmol/L 140 140 143 140 142   < > 145   POTASSIUM mmol/L 4.7 4.7 4.4 4.2 4.0   < > 3.8   CHLORIDE mmol/L 107 107 109* 106 108   < > 112*   CO2 mmol/L 27 27 28 29 26   < > 25   ANION GAP mmol/L 6 6 6 5 8   < > 8   BUN mg/dL 34* 34* 28* 28* 31*   < > 18   CREATININE mg/dL 1.08 1.05 1.00 0.91 1.05   < > 1.04   EGFR ml/min/1.73sq m 57 59 62 70 59   < > 59   CALCIUM mg/dL 9.5 9.7 9.5 9.4 9.4   < > 8.7   MAGNESIUM mg/dL  --   --   --   --   --   --  2.3   PHOSPHORUS  mg/dL  --   --   --   --   --   --  2.6*    < > = values in this interval not displayed.     Results from last 7 days   Lab Units 01/23/24  0504 01/21/24  0505 01/20/24  0426 01/19/24  0509 01/18/24  1322   AST U/L 18 25 35 33 42*   ALT U/L 33 48 52 54* 56*   ALK PHOS U/L 87 89 99 109* 111*   TOTAL PROTEIN g/dL 5.5* 5.3* 5.5* 5.8* 5.8*   ALBUMIN g/dL 3.4* 3.4* 3.4* 3.5 3.5   TOTAL BILIRUBIN mg/dL 0.58 0.57 0.53 0.58 0.57     Results from last 7 days   Lab Units 01/23/24  1053 01/23/24  0609 01/22/24  2133 01/22/24  1605 01/22/24  1035 01/22/24  0557 01/21/24  2048 01/21/24  1605 01/21/24  1107 01/21/24  0620 01/20/24  2101 01/20/24  1610   POC GLUCOSE mg/dl 121 105 175* 82 211* 132 242* 101 135 116 162* 115     Results from last 7 days   Lab Units 01/23/24  0504 01/22/24  0618 01/21/24  0505 01/20/24  0426 01/19/24  0509 01/18/24  1322 01/17/24  0454 01/16/24  1412   GLUCOSE RANDOM mg/dL 101 139 103 103 127 181* 99 103     Results from last 7 days   Lab Units 01/16/24  1412   OSMOLALITY, SERUM mmol/*           Results from last 7 days   Lab Units 01/18/24  1322   PROTIME seconds 15.1*   INR  1.21*     Results from last 7 days   Lab Units 01/16/24  1412   OSMOLALITY, SERUM mmol/*   OSMO UR mmol/     Results from last 7 days   Lab Units 01/16/24  1412   CLARITY UA  Clear   COLOR UA  Light Yellow   SPEC GRAV UA  1.010   PH UA  7.0   GLUCOSE UA mg/dl Negative   KETONES UA mg/dl Negative   BLOOD UA  Negative   PROTEIN UA mg/dl Trace*   NITRITE UA  Negative   BILIRUBIN UA  Negative   UROBILINOGEN UA (BE) mg/dl <2.0   LEUKOCYTES UA  Negative   WBC UA /hpf 2-4*   RBC UA /hpf 1-2   BACTERIA UA /hpf None Seen   EPITHELIAL CELLS WET PREP /hpf Occasional   SODIUM UR  105       Scheduled Medications:    chlorhexidine, 15 mL, Mouth/Throat, Q12H SARTHAK  enoxaparin, 40 mg, Subcutaneous, Daily  insulin lispro, 2-12 Units, Subcutaneous, TID With Meals  insulin lispro, 2-12 Units, Subcutaneous, HS  lamoTRIgine, 150 mg,  Oral, BID  melatonin, 6 mg, Oral, HS  polyethylene glycol, 17 g, Oral, Daily  QUEtiapine, 25 mg, Oral, HS  senna-docusate sodium, 2 tablet, Oral, BID  topiramate, 100 mg, Oral, BID  venlafaxine, 25 mg, Oral, TID With Meals      Continuous IV Infusions:     PRN Meds:  acetaminophen, 650 mg, Oral, Q6H PRN  bisacodyl, 10 mg, Rectal, Daily PRN  ondansetron, 4 mg, Intravenous, Q6H PRN        Discharge Plan: to be determined    Network Utilization Review Department  ATTENTION: Please call with any questions or concerns to [phone] and carefully listen to the prompts so that you are directed to the right person. All voicemails are confidential.   For Discharge needs, contact Care Management DC Support Team at [phone] opt. [phone]  Send all requests for admission clinical reviews, approved or denied determinations and any other requests to dedicated fax number below belonging to the [facility] where the patient is receiving treatment. List of dedicated fax numbers for the Facilities:  FACILITY NAME UR FAX NUMBER   ADMISSION DENIALS (Administrative/Medical Necessity) [phone]   DISCHARGE SUPPORT TEAM (NETWORK) [phone]   PARENT CHILD HEALTH (Maternity/NICU/Pediatrics) [phone]   [facility] [phone]   [facility] [phone]   [facility] [phone]   [facility] [phone]   [facility] [phone]   [facility] [phone]   [facility] [phone]   [facility] [phone]   [facility] [phone]   [facility] [phone]   [facility] [phone]

## 2024-01-23 NOTE — ASSESSMENT & PLAN NOTE
Lab Results   Component Value Date    EGFR 57 01/23/2024    EGFR 59 01/22/2024    EGFR 62 01/21/2024    CREATININE 1.08 01/23/2024    CREATININE 1.05 01/22/2024    CREATININE 1.00 01/21/2024     Monitor BMP

## 2024-01-23 NOTE — ASSESSMENT & PLAN NOTE
Secondary to severe COVID and multifocal pna  Currently being weaned- down to 4-5 L from 8L  Completed 7 days of IV Ceftriaxone  Chest x-ray 1/22 with improved variation of the bilateral airspace opacities  Walking pulse ox ordered  Respiratory protocol.  Incentive spirometer

## 2024-01-23 NOTE — PLAN OF CARE
Problem: Nutrition/Hydration-ADULT  Goal: Nutrient/Hydration intake appropriate for improving, restoring or maintaining nutritional needs  Description: Monitor and assess patient's nutrition/hydration status for malnutrition. Collaborate with interdisciplinary team and initiate plan and interventions as ordered.  Monitor patient's weight and dietary intake as ordered or per policy. Utilize nutrition screening tool and intervene as necessary. Determine patient's food preferences and provide high-protein, high-caloric foods as appropriate.     INTERVENTIONS:  - Monitor oral intake, urinary output, labs, and treatment plans  - Assess nutrition and hydration status and recommend course of action  - Evaluate amount of meals eaten  - Assist patient with eating if necessary   - Allow adequate time for meals  - Recommend/ encourage appropriate diets, oral nutritional supplements, and vitamin/mineral supplements  - Order, calculate, and assess calorie counts as needed  - Recommend, monitor, and adjust tube feedings and TPN/PPN based on assessed needs  - Assess need for intravenous fluids  - Provide specific nutrition/hydration education as appropriate  - Include patient/family/caregiver in decisions related to nutrition  Outcome: Progressing     Problem: PAIN - ADULT  Goal: Verbalizes/displays adequate comfort level or baseline comfort level  Description: Interventions:  - Encourage patient to monitor pain and request assistance  - Assess pain using appropriate pain scale  - Administer analgesics based on type and severity of pain and evaluate response  - Implement non-pharmacological measures as appropriate and evaluate response  - Consider cultural and social influences on pain and pain management  - Notify physician/advanced practitioner if interventions unsuccessful or patient reports new pain  Outcome: Progressing     Problem: Knowledge Deficit  Goal: Patient/family/caregiver demonstrates understanding of disease  process, treatment plan, medications, and discharge instructions  Description: Complete learning assessment and assess knowledge base.  Interventions:  - Provide teaching at level of understanding  - Provide teaching via preferred learning methods  Outcome: Progressing     Problem: RESPIRATORY - ADULT  Goal: Achieves optimal ventilation and oxygenation  Description: INTERVENTIONS:  - Assess for changes in respiratory status  - Assess for changes in mentation and behavior  - Position to facilitate oxygenation and minimize respiratory effort  - Oxygen administered by appropriate delivery if ordered  - Initiate smoking cessation education as indicated  - Encourage broncho-pulmonary hygiene including cough, deep breathe, Incentive Spirometry  - Assess the need for suctioning and aspirate as needed  - Assess and instruct to report SOB or any respiratory difficulty  - Respiratory Therapy support as indicated  Outcome: Progressing

## 2024-01-24 LAB
GLUCOSE SERPL-MCNC: 141 MG/DL (ref 65–140)
GLUCOSE SERPL-MCNC: 159 MG/DL (ref 65–140)
GLUCOSE SERPL-MCNC: 91 MG/DL (ref 65–140)
GLUCOSE SERPL-MCNC: 95 MG/DL (ref 65–140)

## 2024-01-24 PROCEDURE — 97116 GAIT TRAINING THERAPY: CPT

## 2024-01-24 PROCEDURE — 82948 REAGENT STRIP/BLOOD GLUCOSE: CPT

## 2024-01-24 PROCEDURE — 99232 SBSQ HOSP IP/OBS MODERATE 35: CPT | Performed by: STUDENT IN AN ORGANIZED HEALTH CARE EDUCATION/TRAINING PROGRAM

## 2024-01-24 PROCEDURE — 94761 N-INVAS EAR/PLS OXIMETRY MLT: CPT

## 2024-01-24 RX ADMIN — INSULIN LISPRO 2 UNITS: 100 INJECTION, SOLUTION INTRAVENOUS; SUBCUTANEOUS at 17:26

## 2024-01-24 RX ADMIN — TOPIRAMATE 100 MG: 100 TABLET, FILM COATED ORAL at 17:26

## 2024-01-24 RX ADMIN — MELATONIN 6 MG: at 21:34

## 2024-01-24 RX ADMIN — VENLAFAXINE 25 MG: 25 TABLET ORAL at 09:14

## 2024-01-24 RX ADMIN — VENLAFAXINE 25 MG: 25 TABLET ORAL at 11:50

## 2024-01-24 RX ADMIN — ENOXAPARIN SODIUM 40 MG: 40 INJECTION SUBCUTANEOUS at 09:11

## 2024-01-24 RX ADMIN — TOPIRAMATE 100 MG: 100 TABLET, FILM COATED ORAL at 09:12

## 2024-01-24 RX ADMIN — LAMOTRIGINE 150 MG: 100 TABLET ORAL at 21:33

## 2024-01-24 RX ADMIN — LAMOTRIGINE 150 MG: 100 TABLET ORAL at 09:12

## 2024-01-24 RX ADMIN — SENNOSIDES, DOCUSATE SODIUM 2 TABLET: 8.6; 5 TABLET ORAL at 17:26

## 2024-01-24 RX ADMIN — VENLAFAXINE 25 MG: 25 TABLET ORAL at 17:26

## 2024-01-24 RX ADMIN — QUETIAPINE 25 MG: 25 TABLET ORAL at 21:33

## 2024-01-24 NOTE — PROGRESS NOTES
Pt ambulate with RN. In her room during rest her sats were 90% on 4L. Pt walked slow in hallway. Her sats decreased to 85% and O2 was increased to 6L. As she continued walking her sats decreased to 82% and O2 was increased do 8 L. Her heart rate was 130. She rested for a while and she began ambulating again. Her sats increased to 88% and O2 was decreased to 6L. Pt sitting in her room now, resting, her sats is 96% on 4L.

## 2024-01-24 NOTE — ASSESSMENT & PLAN NOTE
Lab Results   Component Value Date    EGFR 57 01/23/2024    EGFR 59 01/22/2024    EGFR 62 01/21/2024    CREATININE 1.08 01/23/2024    CREATININE 1.05 01/22/2024    CREATININE 1.00 01/21/2024   Cr at baseline   Avoid nephrotoxins

## 2024-01-24 NOTE — CASE MANAGEMENT
Case Management Discharge Planning Note    Patient name Carla Hunt  Location [facility] 726/[facility] 726-01 MRN [id number]  : 1965 Date 2024       Current Admission Date: 1/10/2024  Current Admission Diagnosis:Encephalopathy acute   Patient Active Problem List    Diagnosis Date Noted    Dysphagia 2024    Seizure disorder (HCC) 2024    Sepsis ([facility]) 2024    Acute respiratory failure with hypoxia ([facility]) 2024    Transaminitis 2024    Teto marginal zone B-cell lymphoma ([facility]) 2024    Encephalopathy acute 2024    COVID 2024    Stage 3b chronic kidney disease (CKD) ([facility]) 2024    Abnormal CT of the head 2024    Nephrolithiasis 2021    Other specified anxiety disorders 2019    Reactive depression 2019    Hidradenitis suppurativa of left axilla 2019    Anxiety state 2018    Luetscher's syndrome 2018    Disorder of parathyroid gland ([facility]) 2018    Eczema 2018    Gastroenteritis 2018    Grand mal status ([facility]) 2018    Hypercalcemia 2018    Hypertensive disorder 2018    Impacted cerumen 2018    Open wound of hand except fingers 2018    Otitis externa 2018    Overweight 2018    Pain in face 2018    Skin sensation disturbance 2018    Subjective visual disturbance 2018    Encounter for gynecological examination (general) (routine) without abnormal findings 10/23/2018    Long term current use of hormonal contraceptive 10/23/2018    Rosacea 2015      LOS (days): 14  Geometric Mean LOS (GMLOS) (days):   Days to GMLOS:     OBJECTIVE:  Risk of Unplanned Readmission Score: 17.82         Current admission status: Inpatient   Preferred Pharmacy:   [id number] [place] [facility] - [address]  [facility] [place] [address]  Phone: [phone] Fax: [phone]    [facility] - [address]  [address] [facility] [address]  Phone: [phone] Fax: [phone]    Primary Care Provider: Tony Guevara MD    Primary Insurance: [employer]  Secondary Insurance: MEDICARE    DISCHARGE DETAILS:                Additional Comments: Spoke with pt and spouse who was bedside. Advised that when it is determined how much o2 pt will need will refer so that she can discharge. Will also secure [facility] for PT/OT/SN. CM will f/u

## 2024-01-24 NOTE — ASSESSMENT & PLAN NOTE
Secondary to severe COVID and multifocal pna  Completed 7 days of IV Ceftriaxone  Chest x-ray 1/22 with improved variation of the bilateral airspace opacities  Continue to wean O2 as able  RN ambulation desat screen showed patient requiring 4-6L O2 to maintain sat >88%  RT desat screen ordered  Incentive spirometer

## 2024-01-24 NOTE — ASSESSMENT & PLAN NOTE
Started on severe pathway  S/p Actermra 1/9  S/p dexamethasone and remdesivir  Due to patient's immunocompromise state patient will need to have 2 COVID antigen negative results prior to dc Isolation.  Negative results, okay to DC isolation  Wean O2 as tolerated.  Plan as above

## 2024-01-24 NOTE — PLAN OF CARE
Problem: Prexisting or High Potential for Compromised Skin Integrity  Goal: Skin integrity is maintained or improved  Description: INTERVENTIONS:  - Identify patients at risk for skin breakdown  - Assess and monitor skin integrity  - Assess and monitor nutrition and hydration status  - Monitor labs   - Assess for incontinence   - Turn and reposition patient  - Assist with mobility/ambulation  - Relieve pressure over bony prominences  - Avoid friction and shearing  - Provide appropriate hygiene as needed including keeping skin clean and dry  - Evaluate need for skin moisturizer/barrier cream  - Collaborate with interdisciplinary team   - Patient/family teaching  - Consider wound care consult   Outcome: Progressing     Problem: Nutrition/Hydration-ADULT  Goal: Nutrient/Hydration intake appropriate for improving, restoring or maintaining nutritional needs  Description: Monitor and assess patient's nutrition/hydration status for malnutrition. Collaborate with interdisciplinary team and initiate plan and interventions as ordered.  Monitor patient's weight and dietary intake as ordered or per policy. Utilize nutrition screening tool and intervene as necessary. Determine patient's food preferences and provide high-protein, high-caloric foods as appropriate.     INTERVENTIONS:  - Monitor oral intake, urinary output, labs, and treatment plans  - Assess nutrition and hydration status and recommend course of action  - Evaluate amount of meals eaten  - Assist patient with eating if necessary   - Allow adequate time for meals  - Recommend/ encourage appropriate diets, oral nutritional supplements, and vitamin/mineral supplements  - Order, calculate, and assess calorie counts as needed  - Recommend, monitor, and adjust tube feedings and TPN/PPN based on assessed needs  - Assess need for intravenous fluids  - Provide specific nutrition/hydration education as appropriate  - Include patient/family/caregiver in decisions related to  nutrition  Outcome: Progressing     Problem: PAIN - ADULT  Goal: Verbalizes/displays adequate comfort level or baseline comfort level  Description: Interventions:  - Encourage patient to monitor pain and request assistance  - Assess pain using appropriate pain scale  - Administer analgesics based on type and severity of pain and evaluate response  - Implement non-pharmacological measures as appropriate and evaluate response  - Consider cultural and social influences on pain and pain management  - Notify physician/advanced practitioner if interventions unsuccessful or patient reports new pain  Outcome: Progressing     Problem: INFECTION - ADULT  Goal: Absence or prevention of progression during hospitalization  Description: INTERVENTIONS:  - Assess and monitor for signs and symptoms of infection  - Monitor lab/diagnostic results  - Monitor all insertion sites, i.e. indwelling lines, tubes, and drains  - Monitor endotracheal if appropriate and nasal secretions for changes in amount and color  - [place] appropriate cooling/warming therapies per order  - Administer medications as ordered  - Instruct and encourage patient and family to use good hand hygiene technique  - Identify and instruct in appropriate isolation precautions for identified infection/condition  Outcome: Progressing     Problem: SAFETY ADULT  Goal: Patient will remain free of falls  Description: INTERVENTIONS:  - Educate patient/family on patient safety including physical limitations  - Instruct patient to call for assistance with activity   - Consult OT/PT to assist with strengthening/mobility   - Keep Call bell within reach  - Keep bed low and locked with side rails adjusted as appropriate  - Keep care items and personal belongings within reach  - Initiate and maintain comfort rounds  - Make Fall Risk Sign visible to staff  - Offer Toileting every 2 Hours, in advance of need  - Initiate/Maintain bed alarm  - Obtain necessary fall risk management  equipment: non skid footwear  - Apply yellow socks and bracelet for high fall risk patients  - Consider moving patient to room near nurses station  Outcome: Progressing     Problem: RESPIRATORY - ADULT  Goal: Achieves optimal ventilation and oxygenation  Description: INTERVENTIONS:  - Assess for changes in respiratory status  - Assess for changes in mentation and behavior  - Position to facilitate oxygenation and minimize respiratory effort  - Oxygen administered by appropriate delivery if ordered  - Initiate smoking cessation education as indicated  - Encourage broncho-pulmonary hygiene including cough, deep breathe, Incentive Spirometry  - Assess the need for suctioning and aspirate as needed  - Assess and instruct to report SOB or any respiratory difficulty  - Respiratory Therapy support as indicated  Outcome: Progressing     Problem: Potential for Falls  Goal: Patient will remain free of falls  Description: INTERVENTIONS:  - Educate patient/family on patient safety including physical limitations  - Instruct patient to call for assistance with activity   - Consult OT/PT to assist with strengthening/mobility   - Keep Call bell within reach  - Keep bed low and locked with side rails adjusted as appropriate  - Keep care items and personal belongings within reach  - Initiate and maintain comfort rounds  - Make Fall Risk Sign visible to staff  - Offer Toileting every 2 Hours, in advance of need  - Initiate/Maintain bed alarm  - Obtain necessary fall risk management equipment: non skid footwear  - Apply yellow socks and bracelet for high fall risk patients  - Consider moving patient to room near nurses station  Outcome: Progressing

## 2024-01-24 NOTE — PLAN OF CARE
Problem: PAIN - ADULT  Goal: Verbalizes/displays adequate comfort level or baseline comfort level  Description: Interventions:  - Encourage patient to monitor pain and request assistance  - Assess pain using appropriate pain scale  - Administer analgesics based on type and severity of pain and evaluate response  - Implement non-pharmacological measures as appropriate and evaluate response  - Consider cultural and social influences on pain and pain management  - Notify physician/advanced practitioner if interventions unsuccessful or patient reports new pain  Outcome: Progressing     Problem: SAFETY ADULT  Goal: Patient will remain free of falls  Description: INTERVENTIONS:  - Educate patient/family on patient safety including physical limitations  - Instruct patient to call for assistance with activity   - Consult OT/PT to assist with strengthening/mobility   - Keep Call bell within reach  - Keep bed low and locked with side rails adjusted as appropriate  - Keep care items and personal belongings within reach  - Initiate and maintain comfort rounds  - Make Fall Risk Sign visible to staff  - Offer Toileting every 2 Hours, in advance of need  - Initiate/Maintain bed alarm  - Obtain necessary fall risk management equipment: non skid footwear  - Apply yellow socks and bracelet for high fall risk patients  - Consider moving patient to room near nurses station  Outcome: Progressing

## 2024-01-24 NOTE — PHYSICAL THERAPY NOTE
PHYSICAL THERAPY NOTE      Patient Name: Carla Hunt  Today's Date: 1/24/2024 01/24/24 1400   PT Last Visit   PT Visit Date 01/24/24   Note Type   Note Type Treatment   Pain Assessment   Pain Assessment Tool 0-10   Pain Score No Pain   Restrictions/Precautions   Weight Bearing Precautions Per Order No   Other Precautions O2;Fall Risk  (4L O2 at rest; 8 L O2 w/ activity)   General   Chart Reviewed Yes   Response to Previous Treatment Patient with no complaints from previous session.   Family/Caregiver Present Yes  (Spouse)   Cognition   Overall Cognitive Status WFL   Arousal/Participation Alert;Responsive;Cooperative   Attention Within functional limits   Orientation Level Oriented X4   Memory Decreased recall of precautions   Following Commands Follows one step commands without difficulty   Comments pt pleasant and cooperative t/o PT session. Req VC for self-pacing t/o   Bed Mobility   Supine to Sit 6  Modified independent   Additional items HOB elevated   Sit to Supine Unable to assess   Additional Comments Pt supine upon arrival. Pt returned to chair following PT session on 4L O2 and SPO2 >90%   Transfers   Sit to Stand 5  Supervision   Additional items Armrests;Increased time required   Stand to Sit 5  Supervision   Additional items Armrests;Increased time required   Additional Comments w/o AD   Ambulation/Elevation   Gait pattern Improper Weight shift;Narrow HEATHER;Decreased foot clearance;Short stride;Excessively slow   Gait Assistance 5  Supervision   Additional items Assist x 1;Verbal cues  (+ chair follow for seated rest breaks)   Assistive Device None   Distance 80' + 7 steps + 100' + 100'  (seated rest breaks inbetween)   Stair Management Assistance 5  Supervision   Additional items Verbal cues;Increased time required   Stair Management Technique One rail R;Alternating pattern;Foreward;Reciprocal   Number of Stairs 7    Ambulation/Elevation Additional Comments SPO2 monitored t/o PT session: Pt initially ambulated on 6L O2 and SPO2 desat to 85%, slow to rise w/ standing rest break. O2 increased to 8L w/ SPO2 returning to >90%. SPO2 ocassionally decreased to 85-87% during ambulation trial on L but returned with time to >88% w/ frequent seated/standing rest breaks. VC t/o ambulation for self-pacing and pursed lip breathing   Balance   Static Sitting Good   Dynamic Sitting Fair +   Static Standing Fair   Dynamic Standing Fair -   Ambulatory Fair -   Endurance Deficit   Endurance Deficit Yes   Endurance Deficit Description SIEGEL, low SPO2 readings, dec activity tolerance   Activity Tolerance   Activity Tolerance Patient limited by fatigue;Patient tolerated treatment well   Nurse Made Aware RN cleared and updated   Assessment   Prognosis Good   Problem List Decreased endurance;Impaired balance;Decreased cognition   Assessment Pt seen for PT treatment session this date. Therapy session focused on gait training, stair training and endurance training in order to improve overall mobility and independence. Pt requires SUP for mobility w/o AD and VC t/o ambulation for breathing, self pacing and rest breaks. Pt demonstrates overall good balance w/ minimal trunk sway during ambulation w/o AD. X1 incidence of lateral LOB requiring min Ax1 to correct 2/2 fatigue. Recommend RW for longer distances. SPO2 monitored t/o: See PT note. Pt educated on energy conservation techniques and recommendation for AD/DME. Verbalizes understanding and reports no further concerns for potential DC home. Pt making good progress toward goals however continues to be limited by SIEGEL and low SPO2 reading. Pt was left sitting at the end of PT session with all needs in reach. Pt would benefit from continued PT services while in hospital to address remaining limitations. PT to continue treating pt and recommends home w/ HHPT. The patient's AM-PAC Basic Mobility Inpatient  Short Form Raw Score is 20. A Raw score of greater than 16 suggests the patient may benefit from discharge to home. Please also refer to the recommendation of the Physical Therapist for safe discharge planning.   Barriers to Discharge None   Goals   Patient Goals to walk further   STG Expiration Date 01/29/24   PT Treatment Day 3   Plan   Treatment/Interventions Functional transfer training;LE strengthening/ROM;Elevations;Therapeutic exercise;Endurance training;Patient/family training;Equipment eval/education;Bed mobility;Gait training;Spoke to nursing;Spoke to case management;OT   Progress Progressing toward goals   PT Frequency 2-3x/wk   Discharge Recommendation   Rehab Resource Intensity Level, PT III (Minimum Resource Intensity)   Equipment Recommended Walker   Walker Package Recommended Wheeled walker   AM-PAC Basic Mobility Inpatient   Turning in Flat Bed Without Bedrails 4   Lying on Back to Sitting on Edge of Flat Bed Without Bedrails 4   Moving Bed to Chair 3   Standing Up From Chair Using Arms 3   Walk in Room 3   Climb 3-5 Stairs With Railing 3   Basic Mobility Inpatient Raw Score 20   Basic Mobility Standardized Score 43.99   Highest Level Of Mobility   JH-HLM Goal 6: Walk 10 steps or more   JH-HLM Achieved 7: Walk 25 feet or more   Education   Education Provided Mobility training   Patient Demonstrates acceptance/verbal understanding   End of Consult   Patient Position at End of Consult Bedside chair;All needs within reach  (on 4L O2, SPO2 90%)     Debra Babin PT, DPT

## 2024-01-24 NOTE — PLAN OF CARE
Problem: PHYSICAL THERAPY ADULT  Goal: Performs mobility at highest level of function for planned discharge setting.  See evaluation for individualized goals.  Description:    Equipment Recommended:  (none)       See flowsheet documentation for full assessment, interventions and recommendations.  Outcome: Progressing  Note: Prognosis: Good  Problem List: Decreased endurance, Impaired balance, Decreased cognition  Assessment: Pt seen for PT treatment session this date. Therapy session focused on gait training, stair training and endurance training in order to improve overall mobility and independence. Pt requires SUP for mobility w/o AD and VC t/o ambulation for breathing, self pacing and rest breaks. Pt demonstrates overall good balance w/ minimal trunk sway during ambulation w/o AD. X1 incidence of lateral LOB requiring min Ax1 to correct 2/2 fatigue. Recommend RW for longer distances. SPO2 monitored t/o: See PT note. Pt educated on energy conservation techniques and recommendation for AD/DME. Verbalizes understanding and reports no further concerns for potential DC home. Pt making good progress toward goals however continues to be limited by SIEGEL and low SPO2 reading. Pt was left sitting at the end of PT session with all needs in reach. Pt would benefit from continued PT services while in hospital to address remaining limitations. PT to continue treating pt and recommends home w/ HHPT. The patient's AM-PAC Basic Mobility Inpatient Short Form Raw Score is 20. A Raw score of greater than 16 suggests the patient may benefit from discharge to home. Please also refer to the recommendation of the Physical Therapist for safe discharge planning.  Barriers to Discharge: None     Rehab Resource Intensity Level, PT: III (Minimum Resource Intensity)    See flowsheet documentation for full assessment.

## 2024-01-24 NOTE — PROGRESS NOTES
[facility]  Progress Note  Name: Carla Hunt I  MRN: [id number]  Unit/Bed#: [facility] 726-01 I Date of Admission: 1/10/2024   Date of Service: 1/24/2024 I Hospital Day: 14    Assessment/Plan   Seizure disorder (HCC)  Assessment & Plan  S/p Temporal lobectomy in 2007  Continue home Lamictal and Topamax    Dysphagia  Assessment & Plan  Dysphagia noted on admission, resolved.  Evaluation by speech and okay for regular diet    Teto marginal zone B-cell lymphoma (HCC)  Assessment & Plan  Outpatient f/u    Acute respiratory failure with hypoxia (HCC)  Assessment & Plan  Secondary to severe COVID and multifocal pna  Completed 7 days of IV Ceftriaxone  Chest x-ray 1/22 with improved variation of the bilateral airspace opacities  Continue to wean O2 as able  RN ambulation desat screen showed patient requiring 4-6L O2 to maintain sat >88%  RT desat screen ordered  Incentive spirometer      Stage 3b chronic kidney disease (CKD) (Formerly [facility])  Assessment & Plan  Lab Results   Component Value Date    EGFR 57 01/23/2024    EGFR 59 01/22/2024    EGFR 62 01/21/2024    CREATININE 1.08 01/23/2024    CREATININE 1.05 01/22/2024    CREATININE 1.00 01/21/2024   Cr at baseline   Avoid nephrotoxins    COVID  Assessment & Plan  Started on severe pathway  S/p Actermra 1/9  S/p dexamethasone and remdesivir  Due to patient's immunocompromise state patient will need to have 2 COVID antigen negative results prior to dc Isolation.  Negative results, okay to DC isolation  Wean O2 as tolerated.  Plan as above      Anxiety state  Assessment & Plan  Continue home effexor     * Encephalopathy acute  Assessment & Plan  Initial concern of viral encephalitis  Received IV Aciclovir, discontinued after 2 negative lumbar punctures  Body fluid cytology negative for malignancy, leukemia/lymphoma flow cytometry negative   Continue Seroquel and melatonin  Resolved               VTE Pharmacologic Prophylaxis: VTE Score: 11 High  Risk (Score >/= 5) - Pharmacological DVT Prophylaxis Ordered: enoxaparin (Lovenox). Sequential Compression Devices Ordered.    Mobility:   Basic Mobility Inpatient Raw Score: 20  JH-HLM Goal: 6: Walk 10 steps or more  JH-HLM Achieved: 7: Walk 25 feet or more  HLM Goal achieved. Continue to encourage appropriate mobility.    Patient Centered Rounds: I performed bedside rounds with nursing staff today.   Discussions with Specialists or Other Care Team Provider: CM, RT    Education and Discussions with Family / Patient:  Patient will update family.     Total Time Spent on Date of Encounter in care of patient: 35   mins. This time was spent on one or more of the following: performing physical exam; counseling and coordination of care; obtaining or reviewing history; documenting in the medical record; reviewing/ordering tests, medications or procedures; communicating with other healthcare professionals and discussing with patient's family/caregivers.    Current Length of Stay: 14 day(s)  Current Patient Status: Inpatient   Certification Statement: The patient will continue to require additional inpatient hospital stay due to RT desat screen  Discharge Plan: Anticipate discharge tomorrow to home with home services.    Code Status: Level 1 - Full Code    Subjective:   No events overnight. Patient tolerating oral intake. Has no complaints    Objective:     Vitals:   Temp (24hrs), Av °F (37.2 °C), Min:98.4 °F (36.9 °C), Max:99.6 °F (37.6 °C)    Temp:  [98.4 °F (36.9 °C)-99.6 °F (37.6 °C)] 99.6 °F (37.6 °C)  HR:  [] 117  BP: ()/(51-70) 82/53  SpO2:  [82 %-95 %] 82 %  Body mass index is 25.78 kg/m².     Input and Output Summary (last 24 hours):     Intake/Output Summary (Last 24 hours) at 2024 1635  Last data filed at 2024 1300  Gross per 24 hour   Intake 200 ml   Output --   Net 200 ml       Physical Exam:   Physical Exam  Vitals and nursing note reviewed.   Constitutional:       General: She is not  in acute distress.     Appearance: She is well-developed.   HENT:      Head: Normocephalic and atraumatic.   Eyes:      Conjunctiva/sclera: Conjunctivae normal.   Pulmonary:      Effort: Pulmonary effort is normal. No respiratory distress.      Breath sounds: Normal breath sounds. No wheezing.   Musculoskeletal:         General: No swelling.      Cervical back: Neck supple.   Skin:     General: Skin is warm and dry.   Neurological:      Mental Status: She is alert.   Psychiatric:         Mood and Affect: Mood normal.         Behavior: Behavior normal.              Additional Data:     Labs:  Results from last 7 days   Lab Units 01/23/24  0504 01/22/24  0618   WBC Thousand/uL 7.94 10.38*   HEMOGLOBIN g/dL 11.9 12.6   HEMATOCRIT % 38.1 39.6   PLATELETS Thousands/uL 228 244   BANDS PCT %  --  5   LYMPHO PCT %  --  0*   MONO PCT %  --  3*   EOS PCT %  --  0     Results from last 7 days   Lab Units 01/23/24  0504   SODIUM mmol/L 140   POTASSIUM mmol/L 4.7   CHLORIDE mmol/L 107   CO2 mmol/L 27   BUN mg/dL 34*   CREATININE mg/dL 1.08   ANION GAP mmol/L 6   CALCIUM mg/dL 9.5   ALBUMIN g/dL 3.4*   TOTAL BILIRUBIN mg/dL 0.58   ALK PHOS U/L 87   ALT U/L 33   AST U/L 18   GLUCOSE RANDOM mg/dL 101     Results from last 7 days   Lab Units 01/18/24  1322   INR  1.21*     Results from last 7 days   Lab Units 01/24/24  1044 01/24/24  0640 01/23/24  2136 01/23/24  1606 01/23/24  1053 01/23/24  0609 01/22/24  2133 01/22/24  1605 01/22/24  1035 01/22/24  0557 01/21/24  2048 01/21/24  1605   POC GLUCOSE mg/dl 141* 91 125 156* 121 105 175* 82 211* 132 242* 101               Lines/Drains:  Invasive Devices       Peripheral Intravenous Line  Duration             Peripheral IV 01/20/24 Left Antecubital 4 days                          Imaging: No pertinent imaging reviewed.    Recent Cultures (last 7 days):         Last 24 Hours Medication List:   Current Facility-Administered Medications   Medication Dose Route Frequency Provider Last Rate     acetaminophen  650 mg Oral Q6H PRN Laura Rodriguez, DO      bisacodyl  10 mg Rectal Daily PRN Laura Rodriguez, DO      chlorhexidine  15 mL Mouth/Throat Q12H SARTHAK Laura Rodriguez, DO      enoxaparin  40 mg Subcutaneous Daily Laura Rodriguez, DO      insulin lispro  2-12 Units Subcutaneous TID With Meals Laura Rodriguez, DO      insulin lispro  2-12 Units Subcutaneous HS Laura Rodriguez, DO      lamoTRIgine  150 mg Oral BID Laura Rodriguez, DO      melatonin  6 mg Oral HS Laura Rodriguez, DO      ondansetron  4 mg Intravenous Q6H PRN Laura Rodriguez, DO      polyethylene glycol  17 g Oral Daily Laura Rodriguez, DO      QUEtiapine  25 mg Oral HS Laura Rodriguez, DO      senna-docusate sodium  2 tablet Oral BID Laura Rodriguez, DO      topiramate  100 mg Oral BID Laura Rodriguez, DO      venlafaxine  25 mg Oral TID With Meals Laura Rodriguez, DO          Today, Patient Was Seen By: Ro Hamilton MD    **Please Note: This note may have been constructed using a voice recognition system.**

## 2024-01-24 NOTE — UTILIZATION REVIEW
Continued Stay Review    Date: 1/24/2024                        Current Patient Class: inpatient  Current Level of Care: med/surg  HPI:58 y.o. female initially admitted on 1/10  with acute respiratory failure with hypoxia 2/2 severe Covid    Assessment/Plan: remains on O2, tachycardic, low Bps. Completed 7 days of IV ceftriaxone. Attempting to wean down/off. Pt resting O2 sat 90% on 4L. Walked slowly in hallway, sats decreased to 85% and O2 was increased to 6L. As she continued walking her sats decreased to 82% and O2 was increased do 8 L. HR was 130. She rested for a while and she began ambulating again. Her sats increased to 88% and O2 was decreased to 6L. Sitting in room after, sats is 96% on 4L. Will need O2 on d/c. Encourage incentive spirometry, cough & deep breath. Dysphagia resolved, continue reg diet. Continue po meds. Plan to d/c home with HHC and most likely O2. Continue supportive care.    Vital Signs:   Date/Time Temp Pulse Resp BP MAP (mmHg) SpO2 FiO2 (%) Calculated FIO2 (%) - Nasal Cannula O2 Flow Rate (L/min) Nasal Cannula O2 Flow Rate (L/min) O2 Device Patient Position - Orthostatic VS   01/24/24 1308 -- -- -- -- -- -- -- 36 -- 4 L/min Nasal cannula --   01/24/24 1254 -- -- -- -- -- -- -- 44 -- 6 L/min  Nasal cannula --   Nasal Cannula O2 Flow Rate (L/min): post exercise at 01/24/24 1254   01/24/24 1248 -- -- -- -- -- 82 % Abnormal  -- 52 -- 8 L/min Nasal cannula --   01/24/24 1245 -- -- -- -- -- 85 % Abnormal  -- 44 -- 6 L/min Nasal cannula --   01/24/24 1240 -- -- -- -- -- 90 % -- 36 -- 4 L/min Nasal cannula --   01/24/24 09:11:46 -- 117 Abnormal  -- 82/53 Abnormal  63 Abnormal  91 % 6 -- -- -- -- --   01/24/24 0800 -- -- -- -- -- -- 4 -- -- -- -- --   01/24/24 07:28:46 99.6 °F (37.6 °C) 103 -- 81/51 Abnormal  61 Abnormal  95 % -- -- -- -- -- --   01/23/24 22:09:01 98.4 °F (36.9 °C) 90 -- 109/70 83 94 % -- -- -- -- -- --   01/23/24 1945 -- -- -- -- -- 94 % 4 -- -- -- Nasal cannula --   01/23/24  15:10:49 98.8 °F (37.1 °C) 112 Abnormal  16 118/68 85 92 % -- -- -- -- Mid flow nasal cannula Sitting       Pertinent Labs/Diagnostic Results:     Results from last 7 days   Lab Units 01/23/24  0504 01/22/24  0618 01/21/24  0505 01/20/24  0426 01/19/24  0509 01/18/24  0949   WBC Thousand/uL 7.94 10.38* 7.83 7.95 9.33 10.77*   HEMOGLOBIN g/dL 11.9 12.6 12.2 11.7 12.4 11.7   HEMATOCRIT % 38.1 39.6 37.9 37.5 39.8 35.9   PLATELETS Thousands/uL 228 244 230 256 271 269   BANDS PCT %  --  5  --   --   --  1     Results from last 7 days   Lab Units 01/23/24  0504 01/22/24  0618 01/21/24  0505 01/20/24  0426 01/19/24  0509   SODIUM mmol/L 140 140 143 140 142   POTASSIUM mmol/L 4.7 4.7 4.4 4.2 4.0   CHLORIDE mmol/L 107 107 109* 106 108   CO2 mmol/L 27 27 28 29 26   ANION GAP mmol/L 6 6 6 5 8   BUN mg/dL 34* 34* 28* 28* 31*   CREATININE mg/dL 1.08 1.05 1.00 0.91 1.05   EGFR ml/min/1.73sq m 57 59 62 70 59   CALCIUM mg/dL 9.5 9.7 9.5 9.4 9.4     Results from last 7 days   Lab Units 01/23/24  0504 01/21/24  0505 01/20/24  0426 01/19/24  0509 01/18/24  1322   AST U/L 18 25 35 33 42*   ALT U/L 33 48 52 54* 56*   ALK PHOS U/L 87 89 99 109* 111*   TOTAL PROTEIN g/dL 5.5* 5.3* 5.5* 5.8* 5.8*   ALBUMIN g/dL 3.4* 3.4* 3.4* 3.5 3.5   TOTAL BILIRUBIN mg/dL 0.58 0.57 0.53 0.58 0.57     Results from last 7 days   Lab Units 01/24/24  1044 01/24/24  0640 01/23/24  2136 01/23/24  1606 01/23/24  1053 01/23/24  0609 01/22/24  2133 01/22/24  1605 01/22/24  1035 01/22/24  0557 01/21/24  2048 01/21/24  1605   POC GLUCOSE mg/dl 141* 91 125 156* 121 105 175* 82 211* 132 242* 101     Results from last 7 days   Lab Units 01/23/24  0504 01/22/24  0618 01/21/24  0505 01/20/24  0426 01/19/24  0509 01/18/24  1322   GLUCOSE RANDOM mg/dL 101 139 103 103 127 181*     Medications:   Scheduled Medications:  chlorhexidine, 15 mL, Mouth/Throat, Q12H SARTHAK  enoxaparin, 40 mg, Subcutaneous, Daily  insulin lispro, 2-12 Units, Subcutaneous, TID With Meals  insulin  lispro, 2-12 Units, Subcutaneous, HS  lamoTRIgine, 150 mg, Oral, BID  melatonin, 6 mg, Oral, HS  polyethylene glycol, 17 g, Oral, Daily  QUEtiapine, 25 mg, Oral, HS  senna-docusate sodium, 2 tablet, Oral, BID  topiramate, 100 mg, Oral, BID  venlafaxine, 25 mg, Oral, TID With Meals    PRN Meds:  acetaminophen, 650 mg, Oral, Q6H PRN  bisacodyl, 10 mg, Rectal, Daily PRN  ondansetron, 4 mg, Intravenous, Q6H PRN        Discharge Plan: TBD    Network Utilization Review Department  ATTENTION: Please call with any questions or concerns to [phone] and carefully listen to the prompts so that you are directed to the right person. All voicemails are confidential.   For Discharge needs, contact Care Management DC Support Team at [phone] opt. 2  Send all requests for admission clinical reviews, approved or denied determinations and any other requests to dedicated fax number below belonging to the [place] where the patient is receiving treatment. List of dedicated fax numbers for the Facilities:  FACILITY NAME UR FAX NUMBER   ADMISSION DENIALS (Administrative/Medical Necessity) [phone]   DISCHARGE SUPPORT TEAM (NETWORK) [phone]   PARENT CHILD HEALTH (Maternity/NICU/Pediatrics) [phone]   [facility] [phone]   [facility] [phone]   [facility] [phone]   [facility] [phone]   [facility] [phone]   [facility] [phone]   [facility] [phone]   [facility] [phone]   [facility] [phone]   [facility] [phone]   [facility] [phone]

## 2024-01-24 NOTE — RESTORATIVE TECHNICIAN NOTE
Restorative Technician Note      Patient Name: Carla Hunt     Note Type: Mobility  Patient Position Upon Consult: Supine  Activity Performed: Ambulated; Dangled; Stood  Assistive Device: Roller walker; Other (Comment) (NC O2 on 4L Ambulating-77%/Rest-96%. NC O2 on 6L Ambulating-83%/Rest-90-92% post ambulation. MARSHA coombs)  Education Provided: Yes  Patient Position at End of Consult: Bedside chair; All needs within reach; Bed/Chair alarm activated    Héctor LAYNE, Restorative Technician,

## 2024-01-24 NOTE — RESPIRATORY THERAPY NOTE
Home Oxygen Qualifying Test     Patient name: Carla Hunt        : 1965   Date of Test:  2024  Diagnosis:    Home Oxygen Test:    **Medicare Guidelines require item(s) 1-5 on all ambulatory patients or 1 and 2 on non-ambulatory patients.    1. Baseline SPO2 on Room Air at rest 85 %   If <= 88% on Room Air add O2 via NC to obtain SpO2 >=88%. If LPM needed, document LPM 4 needed to reach =>88%    SPO2 during exertion on Room Air 82  %  During exertion monitor SPO2. If SPO2 increases >=89%, do not add supplemental oxygen    SPO2 on Oxygen at Rest 92% at 4 LPM    SPO2 during exertion on Oxygen 90 % at 8 LPM    Test performed during exertion activity.      [x]  Supplemental Home Oxygen is indicated.    []  Client does not qualify for home oxygen.    Respiratory Additional Notes- Patient will require 4 lpm of oxygen at rest and 8 lpm of oxygen with exertion.    Karlene Rm, RT   Respiratory Therapy Note

## 2024-01-25 ENCOUNTER — APPOINTMENT (INPATIENT)
Dept: RADIOLOGY | Facility: HOSPITAL | Age: 59
DRG: 003 | End: 2024-01-25
Payer: COMMERCIAL

## 2024-01-25 PROBLEM — J18.9 PNEUMONIA: Status: ACTIVE | Noted: 2024-01-25

## 2024-01-25 PROBLEM — R13.10 DYSPHAGIA: Status: RESOLVED | Noted: 2024-01-16 | Resolved: 2024-01-25

## 2024-01-25 PROBLEM — G93.40 ENCEPHALOPATHY ACUTE: Status: RESOLVED | Noted: 2024-01-09 | Resolved: 2024-01-25

## 2024-01-25 LAB
ANION GAP SERPL CALCULATED.3IONS-SCNC: 6 MMOL/L
ANISOCYTOSIS BLD QL SMEAR: PRESENT
ATRIAL RATE: 93 BPM
ATRIAL RATE: 98 BPM
BASOPHILS # BLD MANUAL: 0 THOUSAND/UL (ref 0–0.1)
BASOPHILS NFR MAR MANUAL: 0 % (ref 0–1)
BUN SERPL-MCNC: 35 MG/DL (ref 5–25)
CALCIUM SERPL-MCNC: 9.3 MG/DL (ref 8.4–10.2)
CHLORIDE SERPL-SCNC: 103 MMOL/L (ref 96–108)
CO2 SERPL-SCNC: 27 MMOL/L (ref 21–32)
CREAT SERPL-MCNC: 1.14 MG/DL (ref 0.6–1.3)
EOSINOPHIL # BLD MANUAL: 0 THOUSAND/UL (ref 0–0.4)
EOSINOPHIL NFR BLD MANUAL: 0 % (ref 0–6)
ERYTHROCYTE [DISTWIDTH] IN BLOOD BY AUTOMATED COUNT: 17.8 % (ref 11.6–15.1)
GFR SERPL CREATININE-BSD FRML MDRD: 53 ML/MIN/1.73SQ M
GLUCOSE SERPL-MCNC: 112 MG/DL (ref 65–140)
GLUCOSE SERPL-MCNC: 126 MG/DL (ref 65–140)
GLUCOSE SERPL-MCNC: 130 MG/DL (ref 65–140)
GLUCOSE SERPL-MCNC: 89 MG/DL (ref 65–140)
GLUCOSE SERPL-MCNC: 97 MG/DL (ref 65–140)
HCT VFR BLD AUTO: 39 % (ref 34.8–46.1)
HGB BLD-MCNC: 12.4 G/DL (ref 11.5–15.4)
LYMPHOCYTES # BLD AUTO: 0.34 THOUSAND/UL (ref 0.6–4.47)
LYMPHOCYTES # BLD AUTO: 6 % (ref 14–44)
MCH RBC QN AUTO: 30 PG (ref 26.8–34.3)
MCHC RBC AUTO-ENTMCNC: 31.8 G/DL (ref 31.4–37.4)
MCV RBC AUTO: 94 FL (ref 82–98)
METAMYELOCYTES NFR BLD MANUAL: 1 % (ref 0–1)
MONOCYTES # BLD AUTO: 0.11 THOUSAND/UL (ref 0–1.22)
MONOCYTES NFR BLD: 2 % (ref 4–12)
MYELOCYTES NFR BLD MANUAL: 3 % (ref 0–1)
NEUTROPHILS # BLD MANUAL: 4.97 THOUSAND/UL (ref 1.85–7.62)
NEUTS BAND NFR BLD MANUAL: 13 % (ref 0–8)
NEUTS SEG NFR BLD AUTO: 75 % (ref 43–75)
P AXIS: 44 DEGREES
P AXIS: 44 DEGREES
PLATELET # BLD AUTO: 189 THOUSANDS/UL (ref 149–390)
PLATELET BLD QL SMEAR: ADEQUATE
PMV BLD AUTO: 11.8 FL (ref 8.9–12.7)
POIKILOCYTOSIS BLD QL SMEAR: PRESENT
POTASSIUM SERPL-SCNC: 3.8 MMOL/L (ref 3.5–5.3)
PR INTERVAL: 164 MS
PR INTERVAL: 164 MS
PROCALCITONIN SERPL-MCNC: 0.17 NG/ML
QRS AXIS: -16 DEGREES
QRS AXIS: -17 DEGREES
QRSD INTERVAL: 78 MS
QRSD INTERVAL: 78 MS
QT INTERVAL: 334 MS
QT INTERVAL: 342 MS
QTC INTERVAL: 425 MS
QTC INTERVAL: 426 MS
RBC # BLD AUTO: 4.14 MILLION/UL (ref 3.81–5.12)
RBC MORPH BLD: PRESENT
SODIUM SERPL-SCNC: 136 MMOL/L (ref 135–147)
T WAVE AXIS: 32 DEGREES
T WAVE AXIS: 37 DEGREES
VENTRICULAR RATE: 93 BPM
VENTRICULAR RATE: 98 BPM
WBC # BLD AUTO: 5.65 THOUSAND/UL (ref 4.31–10.16)

## 2024-01-25 PROCEDURE — 85027 COMPLETE CBC AUTOMATED: CPT | Performed by: STUDENT IN AN ORGANIZED HEALTH CARE EDUCATION/TRAINING PROGRAM

## 2024-01-25 PROCEDURE — 85007 BL SMEAR W/DIFF WBC COUNT: CPT | Performed by: STUDENT IN AN ORGANIZED HEALTH CARE EDUCATION/TRAINING PROGRAM

## 2024-01-25 PROCEDURE — 80048 BASIC METABOLIC PNL TOTAL CA: CPT | Performed by: STUDENT IN AN ORGANIZED HEALTH CARE EDUCATION/TRAINING PROGRAM

## 2024-01-25 PROCEDURE — G1004 CDSM NDSC: HCPCS

## 2024-01-25 PROCEDURE — 97530 THERAPEUTIC ACTIVITIES: CPT

## 2024-01-25 PROCEDURE — 87186 SC STD MICRODIL/AGAR DIL: CPT | Performed by: STUDENT IN AN ORGANIZED HEALTH CARE EDUCATION/TRAINING PROGRAM

## 2024-01-25 PROCEDURE — 93010 ELECTROCARDIOGRAM REPORT: CPT | Performed by: INTERNAL MEDICINE

## 2024-01-25 PROCEDURE — 82948 REAGENT STRIP/BLOOD GLUCOSE: CPT

## 2024-01-25 PROCEDURE — 87040 BLOOD CULTURE FOR BACTERIA: CPT | Performed by: STUDENT IN AN ORGANIZED HEALTH CARE EDUCATION/TRAINING PROGRAM

## 2024-01-25 PROCEDURE — 93005 ELECTROCARDIOGRAM TRACING: CPT

## 2024-01-25 PROCEDURE — 97116 GAIT TRAINING THERAPY: CPT

## 2024-01-25 PROCEDURE — 71275 CT ANGIOGRAPHY CHEST: CPT

## 2024-01-25 PROCEDURE — 99232 SBSQ HOSP IP/OBS MODERATE 35: CPT | Performed by: STUDENT IN AN ORGANIZED HEALTH CARE EDUCATION/TRAINING PROGRAM

## 2024-01-25 PROCEDURE — 87205 SMEAR GRAM STAIN: CPT | Performed by: STUDENT IN AN ORGANIZED HEALTH CARE EDUCATION/TRAINING PROGRAM

## 2024-01-25 PROCEDURE — 87070 CULTURE OTHR SPECIMN AEROBIC: CPT | Performed by: STUDENT IN AN ORGANIZED HEALTH CARE EDUCATION/TRAINING PROGRAM

## 2024-01-25 PROCEDURE — 84145 PROCALCITONIN (PCT): CPT | Performed by: STUDENT IN AN ORGANIZED HEALTH CARE EDUCATION/TRAINING PROGRAM

## 2024-01-25 PROCEDURE — 87077 CULTURE AEROBIC IDENTIFY: CPT | Performed by: STUDENT IN AN ORGANIZED HEALTH CARE EDUCATION/TRAINING PROGRAM

## 2024-01-25 RX ORDER — METOPROLOL TARTRATE 1 MG/ML
2.5 INJECTION, SOLUTION INTRAVENOUS EVERY 6 HOURS PRN
Status: DISCONTINUED | OUTPATIENT
Start: 2024-01-25 | End: 2024-02-19

## 2024-01-25 RX ORDER — SODIUM CHLORIDE, SODIUM GLUCONATE, SODIUM ACETATE, POTASSIUM CHLORIDE, MAGNESIUM CHLORIDE, SODIUM PHOSPHATE, DIBASIC, AND POTASSIUM PHOSPHATE .53; .5; .37; .037; .03; .012; .00082 G/100ML; G/100ML; G/100ML; G/100ML; G/100ML; G/100ML; G/100ML
1000 INJECTION, SOLUTION INTRAVENOUS ONCE
Status: COMPLETED | OUTPATIENT
Start: 2024-01-25 | End: 2024-01-26

## 2024-01-25 RX ADMIN — MELATONIN 6 MG: at 21:35

## 2024-01-25 RX ADMIN — QUETIAPINE 25 MG: 25 TABLET ORAL at 21:35

## 2024-01-25 RX ADMIN — VENLAFAXINE 25 MG: 25 TABLET ORAL at 13:32

## 2024-01-25 RX ADMIN — TOPIRAMATE 100 MG: 100 TABLET, FILM COATED ORAL at 17:30

## 2024-01-25 RX ADMIN — SODIUM CHLORIDE, SODIUM GLUCONATE, SODIUM ACETATE, POTASSIUM CHLORIDE, MAGNESIUM CHLORIDE, SODIUM PHOSPHATE, DIBASIC, AND POTASSIUM PHOSPHATE 1000 ML: .53; .5; .37; .037; .03; .012; .00082 INJECTION, SOLUTION INTRAVENOUS at 17:31

## 2024-01-25 RX ADMIN — CEFTRIAXONE SODIUM 1000 MG: 10 INJECTION, POWDER, FOR SOLUTION INTRAVENOUS at 17:30

## 2024-01-25 RX ADMIN — VENLAFAXINE 25 MG: 25 TABLET ORAL at 09:10

## 2024-01-25 RX ADMIN — LAMOTRIGINE 150 MG: 100 TABLET ORAL at 09:10

## 2024-01-25 RX ADMIN — SODIUM CHLORIDE 500 ML: 0.9 INJECTION, SOLUTION INTRAVENOUS at 12:52

## 2024-01-25 RX ADMIN — VENLAFAXINE 25 MG: 25 TABLET ORAL at 17:31

## 2024-01-25 RX ADMIN — IOHEXOL 85 ML: 350 INJECTION, SOLUTION INTRAVENOUS at 15:37

## 2024-01-25 RX ADMIN — TOPIRAMATE 100 MG: 100 TABLET, FILM COATED ORAL at 09:11

## 2024-01-25 RX ADMIN — CHLORHEXIDINE GLUCONATE 15 ML: 1.2 SOLUTION ORAL at 21:35

## 2024-01-25 RX ADMIN — ENOXAPARIN SODIUM 40 MG: 40 INJECTION SUBCUTANEOUS at 09:10

## 2024-01-25 RX ADMIN — LAMOTRIGINE 150 MG: 100 TABLET ORAL at 21:35

## 2024-01-25 NOTE — ASSESSMENT & PLAN NOTE
Secondary to severe COVID and multifocal pna  Completed 7 days of IV Ceftriaxone  Chest x-ray 1/22 with improved variation of the bilateral airspace opacities  Continue to wean O2 as able  RT O2 evaluation revealed patient requiring 4 L at rest and 8 L with exertion  Incentive spirometer  CT chest with pneumonia and airspace opacities COVID versus pulmonary fibrosis  Pulmonology consulted

## 2024-01-25 NOTE — PROGRESS NOTES
[facility]  Progress Note  Name: Carla Hunt I  MRN: [id number]  Unit/Bed#: [facility] 726-01 I Date of Admission: 1/10/2024   Date of Service: 1/25/2024 I Hospital Day: 15    Assessment/Plan   Pneumonia  Assessment & Plan  Patient with continued hypoxia requiring 4-8 L of oxygen  She currently reports feeling well and denies cough and shortness of breath  CTA chest with no PE, but showed extensive bilateral groundglass opacity with multifocal consolidation in the right lung, greatest in the right lower lobe, compatible with pneumonia.   Procalcitonin ordered  Sputum and blood cultures ordered  Ceftriaxone started  Obtain CBC  Given tachycardia and concern of early sepsis.  Will give IVF bolus   Pulmonology consulted    Seizure disorder (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)  Assessment & Plan  Outpatient f/u    Acute respiratory failure with hypoxia ([facility])  Assessment & Plan  Secondary to severe COVID and multifocal pna  Completed 7 days of IV Ceftriaxone  Chest x-ray 1/22 with improved variation of the bilateral airspace opacities  Continue to wean O2 as able  RT O2 evaluation revealed patient requiring 4 L at rest and 8 L with exertion  Incentive spirometer  CT chest with pneumonia and airspace opacities COVID versus pulmonary fibrosis  Pulmonology consulted      Stage 3b chronic kidney disease (CKD) ([facility])  Assessment & Plan  Lab Results   Component Value Date    EGFR 57 01/23/2024    EGFR 59 01/22/2024    EGFR 62 01/21/2024    CREATININE 1.08 01/23/2024    CREATININE 1.05 01/22/2024    CREATININE 1.00 01/21/2024   Cr at baseline   Avoid nephrotoxins  Obtain BMP    COVID  Assessment & Plan  Started on severe pathway  S/p Actermra 1/9  S/p dexamethasone and remdesivir  Due to patient's immunocompromise state patient will need to have 2 COVID antigen negative results prior to dc  Isolation.  Negative results, okay to DC isolation  Wean O2 as tolerated.  Plan as above      Anxiety state  Assessment & Plan  Continue home effexor     Dysphagia-resolved as of 1/25/2024  Assessment & Plan  Dysphagia noted on admission, resolved.  Evaluation by speech and joesph for regular diet    * Encephalopathy acute-resolved as of 1/25/2024  Assessment & Plan  Initial concern of viral encephalitis  Received IV Aciclovir, discontinued after 2 negative lumbar punctures  Body fluid cytology negative for malignancy, leukemia/lymphoma flow cytometry negative   Continue Seroquel and melatonin  Resolved                 VTE Pharmacologic Prophylaxis: VTE Score: 11 High Risk (Score >/= 5) - Pharmacological DVT Prophylaxis Ordered: enoxaparin (Lovenox). Sequential Compression Devices Ordered.    Mobility:   Basic Mobility Inpatient Raw Score: 20  JH-HLM Goal: 6: Walk 10 steps or more  JH-HLM Achieved: 7: Walk 25 feet or more  HLM Goal achieved. Continue to encourage appropriate mobility.    Patient Centered Rounds: I performed bedside rounds with nursing staff today.   Discussions with Specialists or Other Care Team Provider: CM    Education and Discussions with Family / Patient: Updated  ( and daughter) at bedside.    Total Time Spent on Date of Encounter in care of patient: 45 mins. This time was spent on one or more of the following: performing physical exam; counseling and coordination of care; obtaining or reviewing history; documenting in the medical record; reviewing/ordering tests, medications or procedures; communicating with other healthcare professionals and discussing with patient's family/caregivers.    Current Length of Stay: 15 day(s)  Current Patient Status: Inpatient   Certification Statement: The patient will continue to require additional inpatient hospital stay due to IV antibiotics, pulmonology consult  Discharge Plan: Anticipate discharge in 48-72 hrs to home.    Code Status:  Level 1 - Full Code    Subjective:   No acute events overnight.  Patient reports feeling well this morning, she denies shortness of breath or cough.  No fever or chills.  We discussed her oxygen desaturation screen results.  She reports wanting to go home as she feels well.    Objective:     Vitals:   Temp (24hrs), Av.8 °F (37.1 °C), Min:98.4 °F (36.9 °C), Max:99.2 °F (37.3 °C)    Temp:  [98.4 °F (36.9 °C)-99.2 °F (37.3 °C)] 98.4 °F (36.9 °C)  HR:  [110-127] 122  BP: ()/(50-60) 92/59  SpO2:  [89 %-96 %] 89 %  Body mass index is 25.74 kg/m².     Input and Output Summary (last 24 hours):   No intake or output data in the 24 hours ending 24 1810    Physical Exam:   Physical Exam  Vitals and nursing note reviewed.   Constitutional:       General: She is not in acute distress.     Appearance: She is well-developed.   HENT:      Head: Normocephalic and atraumatic.   Eyes:      Conjunctiva/sclera: Conjunctivae normal.   Cardiovascular:      Rate and Rhythm: Normal rate and regular rhythm.      Heart sounds: No murmur heard.  Pulmonary:      Effort: Pulmonary effort is normal. No respiratory distress.      Breath sounds: No wheezing, rhonchi or rales.   Musculoskeletal:         General: No swelling.      Cervical back: Neck supple.   Skin:     General: Skin is warm and dry.   Neurological:      Mental Status: She is alert.   Psychiatric:         Mood and Affect: Mood normal.         Behavior: Behavior normal.          Additional Data:     Labs:  Results from last 7 days   Lab Units 24  1739 24  0504 24  0618   WBC Thousand/uL 5.65   < > 10.38*   HEMOGLOBIN g/dL 12.4   < > 12.6   HEMATOCRIT % 39.0   < > 39.6   PLATELETS Thousands/uL 189   < > 244   BANDS PCT %  --   --  5   LYMPHO PCT %  --   --  0*   MONO PCT %  --   --  3*   EOS PCT %  --   --  0    < > = values in this interval not displayed.     Results from last 7 days   Lab Units 24  0504   SODIUM mmol/L 140   POTASSIUM mmol/L  4.7   CHLORIDE mmol/L 107   CO2 mmol/L 27   BUN mg/dL 34*   CREATININE mg/dL 1.08   ANION GAP mmol/L 6   CALCIUM mg/dL 9.5   ALBUMIN g/dL 3.4*   TOTAL BILIRUBIN mg/dL 0.58   ALK PHOS U/L 87   ALT U/L 33   AST U/L 18   GLUCOSE RANDOM mg/dL 101         Results from last 7 days   Lab Units 01/25/24  1617 01/25/24  1050 01/25/24  0707 01/24/24  2124 01/24/24  1646 01/24/24  1044 01/24/24  0640 01/23/24  2136 01/23/24  1606 01/23/24  1053 01/23/24  0609 01/22/24  2133   POC GLUCOSE mg/dl 130 126 89 95 159* 141* 91 125 156* 121 105 175*               Lines/Drains:  Invasive Devices       Peripheral Intravenous Line  Duration             Peripheral IV 01/20/24 Left Antecubital 5 days    Peripheral IV 01/25/24 Distal;Right;Ventral (anterior) Forearm <1 day                          Imaging: Reviewed radiology reports from this admission including: chest CT scan    Recent Cultures (last 7 days):         Last 24 Hours Medication List:   Current Facility-Administered Medications   Medication Dose Route Frequency Provider Last Rate    acetaminophen  650 mg Oral Q6H PRN Laura Rodriguez DO      bisacodyl  10 mg Rectal Daily PRN Laura Rodriguez DO      cefTRIAXone  1,000 mg Intravenous Q24H Ro Hamilton MD 1,000 mg (01/25/24 1730)    chlorhexidine  15 mL Mouth/Throat Q12H [facility] Laura Rodriguez DO      enoxaparin  40 mg Subcutaneous Daily Laura Rodriguez DO      insulin lispro  2-12 Units Subcutaneous TID With Meals Laura Rodriguez DO      insulin lispro  2-12 Units Subcutaneous HS Laura Rodriguez DO      lamoTRIgine  150 mg Oral BID Laura Rodriguez DO      melatonin  6 mg Oral HS Laura Rodriguez DO      metoprolol  2.5 mg Intravenous Q6H PRN Ro Hamilton MD      multi-electrolyte  1,000 mL Intravenous Once Ro Hamilton MD      ondansetron  4 mg Intravenous Q6H PRN Laura Rodriguez DO      polyethylene glycol  17 g Oral Daily Laura Rodriguez DO      QUEtiapine  25 mg Oral HS Laura Rodriguez, DO       senna-docusate sodium  2 tablet Oral BID Laura Rodriguez DO      sodium chloride  1,000 mL Intravenous Once Ro Hamilton MD      topiramate  100 mg Oral BID Laura Rodriguez DO      venlafaxine  25 mg Oral TID With Meals Laura Rodriguez DO          Today, Patient Was Seen By: Ro Hamilton MD    **Please Note: This note may have been constructed using a voice recognition system.**

## 2024-01-25 NOTE — CASE MANAGEMENT
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[facility]) 2024    Encephalopathy acute 2024    COVID 2024    Stage 3b chronic kidney disease (CKD) ([facility]) 2024    Abnormal CT of the head 2024    Nephrolithiasis 2021    Other specified anxiety disorders 2019    Reactive depression 2019    Hidradenitis suppurativa of left axilla 2019    Anxiety state 2018    Luetscher's syndrome 2018    Disorder of parathyroid gland ([facility]) 2018    Eczema 2018    Gastroenteritis 2018    Grand mal status ([facility]) 2018    Hypercalcemia 2018    Hypertensive disorder 2018    Impacted cerumen 2018    Open wound of hand except fingers 2018    Otitis externa 2018    Overweight 2018    Pain in face 2018    Skin sensation disturbance 2018    Subjective visual disturbance 2018    Encounter for gynecological examination (general) (routine) without abnormal findings 10/23/2018    Long term current use of hormonal contraceptive 10/23/2018    Rosacea 2015      LOS (days): 15  Geometric Mean LOS (GMLOS) (days):   Days to GMLOS:     OBJECTIVE:  Risk of Unplanned Readmission Score: 18.08         Current admission status: Inpatient   Preferred Pharmacy:   [id number] [place] [facility] - [address]  [facility] [place] [address]  Phone: [phone] Fax: [phone]    [facility] - [address]  [address] [place] [address]  Phone: [phone] Fax: [phone]    Primary Care Provider: Tony Guevara MD    Primary Insurance: [employer]  Secondary Insurance: MEDICARE    DISCHARGE DETAILS:             Additional Comments: Pt is not cleared for discharge. Pt remains on 4lpm @ rest and 15lpm to recover. Pending doppler

## 2024-01-25 NOTE — UTILIZATION REVIEW
Continued Stay Review    Date: 1-25- 24                           Current Patient Class: inpatient  Current Level of Care: med surg    HPI:58 y.o. female initially admitted on 1- 10-24      Assessment/Plan:     Today tachycardia, hypotension and Map 62%. Received Iv .9% ns bolus 500 ml.   O 2 sats 91%.  Home O 2 evaluation completed - patient requires 4L O2nc for chronic home use.      Vital Signs:     Date/Time Temp Pulse Resp BP MAP (mmHg) SpO2   01/25/24 12:13:37 -- 127 Abnormal  -- 81/53 Abnormal  62 Abnormal  91 %   01/25/24 07:35:04 99.2 °F (37.3 °C) 115 Abnormal  -- 70/50 Abnormal  57 Abnormal  96 %         Pertinent Labs/Diagnostic Results:       Results from last 7 days   Lab Units 01/23/24  0504 01/22/24  0618 01/21/24  0505 01/20/24 0426 01/19/24  0509   WBC Thousand/uL 7.94 10.38* 7.83 7.95 9.33   HEMOGLOBIN g/dL 11.9 12.6 12.2 11.7 12.4   HEMATOCRIT % 38.1 39.6 37.9 37.5 39.8   PLATELETS Thousands/uL 228 244 230 256 271   BANDS PCT %  --  5  --   --   --          Results from last 7 days   Lab Units 01/23/24  0504 01/22/24  0618 01/21/24  0505 01/20/24 0426 01/19/24  0509   SODIUM mmol/L 140 140 143 140 142   POTASSIUM mmol/L 4.7 4.7 4.4 4.2 4.0   CHLORIDE mmol/L 107 107 109* 106 108   CO2 mmol/L 27 27 28 29 26   ANION GAP mmol/L 6 6 6 5 8   BUN mg/dL 34* 34* 28* 28* 31*   CREATININE mg/dL 1.08 1.05 1.00 0.91 1.05   EGFR ml/min/1.73sq m 57 59 62 70 59   CALCIUM mg/dL 9.5 9.7 9.5 9.4 9.4     Results from last 7 days   Lab Units 01/23/24  0504 01/21/24  0505 01/20/24  0426 01/19/24  0509   AST U/L 18 25 35 33   ALT U/L 33 48 52 54*   ALK PHOS U/L 87 89 99 109*   TOTAL PROTEIN g/dL 5.5* 5.3* 5.5* 5.8*   ALBUMIN g/dL 3.4* 3.4* 3.4* 3.5   TOTAL BILIRUBIN mg/dL 0.58 0.57 0.53 0.58     Results from last 7 days   Lab Units 01/25/24  1050 01/25/24  0707 01/24/24  2124 01/24/24  1646 01/24/24  1044 01/24/24  0640 01/23/24  2136 01/23/24  1606 01/23/24  1053 01/23/24  0609 01/22/24  2133 01/22/24  1605   POC  GLUCOSE mg/dl 126 89 95 159* 141* 91 125 156* 121 105 175* 82     Results from last 7 days   Lab Units 01/23/24  0504 01/22/24  0618 01/21/24  0505 01/20/24  0426 01/19/24  0509   GLUCOSE RANDOM mg/dL 101 139 103 103 127       Scheduled Medications:  chlorhexidine, 15 mL, Mouth/Throat, Q12H SARTHAK  enoxaparin, 40 mg, Subcutaneous, Daily  insulin lispro, 2-12 Units, Subcutaneous, TID With Meals  insulin lispro, 2-12 Units, Subcutaneous, HS  lamoTRIgine, 150 mg, Oral, BID  melatonin, 6 mg, Oral, HS  polyethylene glycol, 17 g, Oral, Daily  QUEtiapine, 25 mg, Oral, HS  senna-docusate sodium, 2 tablet, Oral, BID  topiramate, 100 mg, Oral, BID  venlafaxine, 25 mg, Oral, TID With Meals      Continuous IV Infusions:     PRN Meds:  acetaminophen, 650 mg, Oral, Q6H PRN  bisacodyl, 10 mg, Rectal, Daily PRN  ondansetron, 4 mg, Intravenous, Q6H PRN        Discharge Plan: patient requires 4L O2nc at home.VNA for PT/OT     Network Utilization Review Department  ATTENTION: Please call with any questions or concerns to [phone] and carefully listen to the prompts so that you are directed to the right person. All voicemails are confidential.   For Discharge needs, contact Care Management DC Support Team at [phone] opt. 2  Send all requests for admission clinical reviews, approved or denied determinations and any other requests to dedicated fax number below belonging to the [place] where the patient is receiving treatment. List of dedicated fax numbers for the Facilities:  FACILITY NAME UR FAX NUMBER   ADMISSION DENIALS (Administrative/Medical Necessity) [phone]   DISCHARGE SUPPORT TEAM (NETWORK) [phone]   PARENT CHILD HEALTH (Maternity/NICU/Pediatrics) [phone]   [facility] [phone]   [facility] [phone]   [facility] [phone]   [facility] [phone]   [facility]  [phone]   [facility] [phone]   [facility] [phone]   [facility] [phone]   [facility] [phone]   [facility] [phone]   [facility] [phone]

## 2024-01-25 NOTE — PLAN OF CARE
Problem: PHYSICAL THERAPY ADULT  Goal: Performs mobility at highest level of function for planned discharge setting.  See evaluation for individualized goals.  Description:    Equipment Recommended:  (none)       See flowsheet documentation for full assessment, interventions and recommendations.  Outcome: Progressing  Note: Prognosis: Good  Problem List: Decreased endurance, Impaired balance, Decreased cognition  Assessment: Patient received in bedside chair. Patient agreeable to therapy. Patient performs functional transfers with supervision using rolling walker, x6 STS throughout. Patient performs ambulation with supervision -CGA @times using rolling walker, 100'x5. Patient with occasional instability, but able to self correct with CGA from PT. SPO2 readings fluctuating throughout 85-93% on 6LO2 NC.  Patient left in bedside chair with all needs met and call bell/personal items within reach. The patient's AM-PAC Basic Mobility Inpatient Short Form Raw Score is 20 showing further need for skilled PT services in order to improve functional mobility, decrease need for assistance, and return to PLOF. PT is recommending Level 3 - Minimum Resource Intensity on d/c from hospital.  Will continue to follow as able.  Barriers to Discharge: None     Rehab Resource Intensity Level, PT: III (Minimum Resource Intensity)    See flowsheet documentation for full assessment.

## 2024-01-25 NOTE — ASSESSMENT & PLAN NOTE
Patient with continued hypoxia requiring 4-8 L of oxygen  She currently reports feeling well and denies cough and shortness of breath  CTA chest with no PE, but showed extensive bilateral groundglass opacity with multifocal consolidation in the right lung, greatest in the right lower lobe, compatible with pneumonia.   Procalcitonin ordered  Ceftriaxone started  Obtain CBC  Given tachycardia patient likely early sepsis.  Will give 30 cc/kg bolus  Pulmonology consulted

## 2024-01-25 NOTE — PHYSICAL THERAPY NOTE
PHYSICAL THERAPY NOTE          Patient Name: Carla Hunt  Today's Date: 1/25/2024 01/25/24 1130   PT Last Visit   PT Visit Date 01/25/24   Note Type   Note Type Treatment   Pain Assessment   Pain Assessment Tool 0-10   Pain Score No Pain   Restrictions/Precautions   Weight Bearing Precautions Per Order No   Other Precautions Fall Risk;O2  (6LO2)   General   Chart Reviewed Yes   Family/Caregiver Present No   Cognition   Overall Cognitive Status WFL   Arousal/Participation Alert;Cooperative   Attention Within functional limits   Orientation Level Oriented X4   Memory Decreased recall of precautions   Following Commands Follows one step commands without difficulty   Comments Patient is pleasant and cooperative. Pacing cues throughout   Subjective   Subjective Patient agreeable to PT tx   Bed Mobility   Additional Comments OOB in chair on arrival   Transfers   Sit to Stand 5  Supervision   Additional items Armrests;Increased time required;Verbal cues   Stand to Sit 5  Supervision   Additional items Increased time required;Verbal cues;Armrests   Additional Comments RW - patient states feeling safer with RW and would rather use RW vs no AD   Ambulation/Elevation   Gait pattern Improper Weight shift;Narrow HEATHER;Decreased foot clearance;Short stride;Excessively slow   Gait Assistance 5  Supervision  (Sup-CGA)   Additional items Assist x 1;Verbal cues   Assistive Device Rolling walker   Distance 100'x5  (seated rest period between trials)   Ambulation/Elevation Additional Comments SPO2 monitored t/o PT session: Pt initially ambulated on 6L O2 and SPO2 desat to 85% at lowest. Patient fluctuating throughout gait 85-93%. VC t/o ambulation for self-pacing and pursed lip breathing   Balance   Static Sitting Good   Dynamic Sitting Fair +   Static Standing Fair   Dynamic Standing Fair -   Ambulatory Fair -   Endurance Deficit   Endurance  Deficit Yes   Endurance Deficit Description fatigue, weakness - denies SIEGEL   Activity Tolerance   Activity Tolerance Patient tolerated treatment well   Nurse Made Aware RN cleared   Assessment   Prognosis Good   Problem List Decreased endurance;Impaired balance;Decreased cognition   Assessment Patient received in bedside chair. Patient agreeable to therapy. Patient performs functional transfers with supervision using rolling walker, x6 STS throughout. Patient performs ambulation with supervision -CGA @times using rolling walker, 100'x5. Patient with occasional instability, but able to self correct with CGA from PT. SPO2 readings fluctuating throughout 85-93% on 6LO2 NC.  Patient left in bedside chair with all needs met and call bell/personal items within reach. The patient's AM-PAC Basic Mobility Inpatient Short Form Raw Score is 20 showing further need for skilled PT services in order to improve functional mobility, decrease need for assistance, and return to PLOF. PT is recommending Level 3 - Minimum Resource Intensity on d/c from hospital.  Will continue to follow as able.   Barriers to Discharge None   Goals   Patient Goals to not have to wear O2   PT Treatment Day 4   Plan   Treatment/Interventions Functional transfer training;LE strengthening/ROM;Elevations;Therapeutic exercise;Endurance training;Patient/family training;Equipment eval/education;Bed mobility;Gait training;Spoke to nursing;Spoke to case management;OT   Progress Progressing toward goals   PT Frequency 2-3x/wk   Discharge Recommendation   Rehab Resource Intensity Level, PT III (Minimum Resource Intensity)   Equipment Recommended Walker   Walker Package Recommended Wheeled walker   AM-PAC Basic Mobility Inpatient   Turning in Flat Bed Without Bedrails 4   Lying on Back to Sitting on Edge of Flat Bed Without Bedrails 4   Moving Bed to Chair 3   Standing Up From Chair Using Arms 3   Walk in Room 3   Climb 3-5 Stairs With Railing 3   Basic Mobility  Inpatient Raw Score 20   Basic Mobility Standardized Score 43.99   Highest Level Of Mobility   -HLM Goal 6: Walk 10 steps or more   JH-HLM Achieved 7: Walk 25 feet or more   End of Consult   Patient Position at End of Consult All needs within reach;Bedside chair;Bed/Chair alarm activated       Ynes Hogan, PT, DPT

## 2024-01-25 NOTE — RESTORATIVE TECHNICIAN NOTE
Restorative Technician Note      Patient Name: Carla Hunt     Note Type: Mobility  Patient Position Upon Consult: Supine  Activity Performed: Ambulated; Dangled; Stood  Assistive Device: Other (Comment) (none, NC O2 on 6L)  Education Provided: Yes  Patient Position at End of Consult: Bedside chair; All needs within reach; Bed/Chair alarm activated    Héctor LAYNE, Restorative Technician,

## 2024-01-26 LAB
ANION GAP SERPL CALCULATED.3IONS-SCNC: 3 MMOL/L
BNP SERPL-MCNC: 12 PG/ML (ref 0–100)
BUN SERPL-MCNC: 24 MG/DL (ref 5–25)
CALCIUM SERPL-MCNC: 8.7 MG/DL (ref 8.4–10.2)
CHLORIDE SERPL-SCNC: 111 MMOL/L (ref 96–108)
CO2 SERPL-SCNC: 26 MMOL/L (ref 21–32)
CREAT SERPL-MCNC: 1.04 MG/DL (ref 0.6–1.3)
ERYTHROCYTE [DISTWIDTH] IN BLOOD BY AUTOMATED COUNT: 17.7 % (ref 11.6–15.1)
GFR SERPL CREATININE-BSD FRML MDRD: 59 ML/MIN/1.73SQ M
GLUCOSE SERPL-MCNC: 134 MG/DL (ref 65–140)
GLUCOSE SERPL-MCNC: 154 MG/DL (ref 65–140)
GLUCOSE SERPL-MCNC: 83 MG/DL (ref 65–140)
GLUCOSE SERPL-MCNC: 90 MG/DL (ref 65–140)
GLUCOSE SERPL-MCNC: 94 MG/DL (ref 65–140)
HCT VFR BLD AUTO: 34.7 % (ref 34.8–46.1)
HGB BLD-MCNC: 11.2 G/DL (ref 11.5–15.4)
MCH RBC QN AUTO: 30.2 PG (ref 26.8–34.3)
MCHC RBC AUTO-ENTMCNC: 32.3 G/DL (ref 31.4–37.4)
MCV RBC AUTO: 94 FL (ref 82–98)
PLATELET # BLD AUTO: 161 THOUSANDS/UL (ref 149–390)
PMV BLD AUTO: 11.9 FL (ref 8.9–12.7)
POTASSIUM SERPL-SCNC: 4.2 MMOL/L (ref 3.5–5.3)
RBC # BLD AUTO: 3.71 MILLION/UL (ref 3.81–5.12)
SODIUM SERPL-SCNC: 140 MMOL/L (ref 135–147)
WBC # BLD AUTO: 4.28 THOUSAND/UL (ref 4.31–10.16)

## 2024-01-26 PROCEDURE — 97535 SELF CARE MNGMENT TRAINING: CPT

## 2024-01-26 PROCEDURE — 97129 THER IVNTJ 1ST 15 MIN: CPT

## 2024-01-26 PROCEDURE — NC001 PR NO CHARGE: Performed by: INTERNAL MEDICINE

## 2024-01-26 PROCEDURE — 94760 N-INVAS EAR/PLS OXIMETRY 1: CPT

## 2024-01-26 PROCEDURE — 99232 SBSQ HOSP IP/OBS MODERATE 35: CPT | Performed by: STUDENT IN AN ORGANIZED HEALTH CARE EDUCATION/TRAINING PROGRAM

## 2024-01-26 PROCEDURE — 80048 BASIC METABOLIC PNL TOTAL CA: CPT | Performed by: STUDENT IN AN ORGANIZED HEALTH CARE EDUCATION/TRAINING PROGRAM

## 2024-01-26 PROCEDURE — 83880 ASSAY OF NATRIURETIC PEPTIDE: CPT

## 2024-01-26 PROCEDURE — 99223 1ST HOSP IP/OBS HIGH 75: CPT | Performed by: INTERNAL MEDICINE

## 2024-01-26 PROCEDURE — 82948 REAGENT STRIP/BLOOD GLUCOSE: CPT

## 2024-01-26 PROCEDURE — 85027 COMPLETE CBC AUTOMATED: CPT | Performed by: STUDENT IN AN ORGANIZED HEALTH CARE EDUCATION/TRAINING PROGRAM

## 2024-01-26 RX ORDER — FUROSEMIDE 10 MG/ML
40 INJECTION INTRAMUSCULAR; INTRAVENOUS ONCE
Status: COMPLETED | OUTPATIENT
Start: 2024-01-26 | End: 2024-01-26

## 2024-01-26 RX ORDER — AZITHROMYCIN 250 MG/1
500 TABLET, FILM COATED ORAL EVERY 24 HOURS
Status: DISCONTINUED | OUTPATIENT
Start: 2024-01-26 | End: 2024-01-26

## 2024-01-26 RX ADMIN — VENLAFAXINE 25 MG: 25 TABLET ORAL at 17:52

## 2024-01-26 RX ADMIN — ENOXAPARIN SODIUM 40 MG: 40 INJECTION SUBCUTANEOUS at 09:13

## 2024-01-26 RX ADMIN — LAMOTRIGINE 150 MG: 100 TABLET ORAL at 09:13

## 2024-01-26 RX ADMIN — VENLAFAXINE 25 MG: 25 TABLET ORAL at 12:34

## 2024-01-26 RX ADMIN — CEFEPIME 2000 MG: 2 INJECTION, POWDER, FOR SOLUTION INTRAVENOUS at 17:56

## 2024-01-26 RX ADMIN — TOPIRAMATE 100 MG: 100 TABLET, FILM COATED ORAL at 17:51

## 2024-01-26 RX ADMIN — VENLAFAXINE 25 MG: 25 TABLET ORAL at 09:14

## 2024-01-26 RX ADMIN — MELATONIN 6 MG: at 22:29

## 2024-01-26 RX ADMIN — TOPIRAMATE 100 MG: 100 TABLET, FILM COATED ORAL at 09:14

## 2024-01-26 RX ADMIN — QUETIAPINE 25 MG: 25 TABLET ORAL at 22:29

## 2024-01-26 RX ADMIN — LAMOTRIGINE 150 MG: 100 TABLET ORAL at 22:29

## 2024-01-26 RX ADMIN — INSULIN LISPRO 2 UNITS: 100 INJECTION, SOLUTION INTRAVENOUS; SUBCUTANEOUS at 12:34

## 2024-01-26 RX ADMIN — FUROSEMIDE 40 MG: 10 INJECTION, SOLUTION INTRAMUSCULAR; INTRAVENOUS at 17:51

## 2024-01-26 NOTE — PLAN OF CARE
Problem: Prexisting or High Potential for Compromised Skin Integrity  Goal: Skin integrity is maintained or improved  Description: INTERVENTIONS:  - Identify patients at risk for skin breakdown  - Assess and monitor skin integrity  - Assess and monitor nutrition and hydration status  - Monitor labs   - Assess for incontinence   - Turn and reposition patient  - Assist with mobility/ambulation  - Relieve pressure over bony prominences  - Avoid friction and shearing  - Provide appropriate hygiene as needed including keeping skin clean and dry  - Evaluate need for skin moisturizer/barrier cream  - Collaborate with interdisciplinary team   - Patient/family teaching  - Consider wound care consult   Outcome: Progressing     Problem: Nutrition/Hydration-ADULT  Goal: Nutrient/Hydration intake appropriate for improving, restoring or maintaining nutritional needs  Description: Monitor and assess patient's nutrition/hydration status for malnutrition. Collaborate with interdisciplinary team and initiate plan and interventions as ordered.  Monitor patient's weight and dietary intake as ordered or per policy. Utilize nutrition screening tool and intervene as necessary. Determine patient's food preferences and provide high-protein, high-caloric foods as appropriate.     INTERVENTIONS:  - Monitor oral intake, urinary output, labs, and treatment plans  - Assess nutrition and hydration status and recommend course of action  - Evaluate amount of meals eaten  - Assist patient with eating if necessary   - Allow adequate time for meals  - Recommend/ encourage appropriate diets, oral nutritional supplements, and vitamin/mineral supplements  - Order, calculate, and assess calorie counts as needed  - Recommend, monitor, and adjust tube feedings and TPN/PPN based on assessed needs  - Assess need for intravenous fluids  - Provide specific nutrition/hydration education as appropriate  - Include patient/family/caregiver in decisions related to  nutrition  Outcome: Progressing     Problem: PAIN - ADULT  Goal: Verbalizes/displays adequate comfort level or baseline comfort level  Description: Interventions:  - Encourage patient to monitor pain and request assistance  - Assess pain using appropriate pain scale  - Administer analgesics based on type and severity of pain and evaluate response  - Implement non-pharmacological measures as appropriate and evaluate response  - Consider cultural and social influences on pain and pain management  - Notify physician/advanced practitioner if interventions unsuccessful or patient reports new pain  Outcome: Progressing     Problem: INFECTION - ADULT  Goal: Absence or prevention of progression during hospitalization  Description: INTERVENTIONS:  - Assess and monitor for signs and symptoms of infection  - Monitor lab/diagnostic results  - Monitor all insertion sites, i.e. indwelling lines, tubes, and drains  - Monitor endotracheal if appropriate and nasal secretions for changes in amount and color  - [place] appropriate cooling/warming therapies per order  - Administer medications as ordered  - Instruct and encourage patient and family to use good hand hygiene technique  - Identify and instruct in appropriate isolation precautions for identified infection/condition  Outcome: Progressing     Problem: SAFETY ADULT  Goal: Patient will remain free of falls  Description: INTERVENTIONS:  - Educate patient/family on patient safety including physical limitations  - Instruct patient to call for assistance with activity   - Consult OT/PT to assist with strengthening/mobility   - Keep Call bell within reach  - Keep bed low and locked with side rails adjusted as appropriate  - Keep care items and personal belongings within reach  - Initiate and maintain comfort rounds  - Make Fall Risk Sign visible to staff  - Offer Toileting every 2 Hours, in advance of need  - Initiate/Maintain bed alarm  - Obtain necessary fall risk management  equipment: non skid footwear  - Apply yellow socks and bracelet for high fall risk patients  - Consider moving patient to room near nurses station  Outcome: Progressing  Goal: Maintain or return to baseline ADL function  Description: INTERVENTIONS:  -  Assess patient's ability to carry out ADLs; assess patient's baseline for ADL function and identify physical deficits which impact ability to perform ADLs (bathing, care of mouth/teeth, toileting, grooming, dressing, etc.)  - Assess/evaluate cause of self-care deficits   - Assess range of motion  - Assess patient's mobility; develop plan if impaired  - Assess patient's need for assistive devices and provide as appropriate  - Encourage maximum independence but intervene and supervise when necessary  - Involve family in performance of ADLs  - Assess for home care needs following discharge   - Consider OT consult to assist with ADL evaluation and planning for discharge  - Provide patient education as appropriate  Outcome: Progressing  Goal: Maintains/Returns to pre admission functional level  Description: INTERVENTIONS:  - Perform AM-PAC 6 Click Basic Mobility/ Daily Activity assessment daily.  - Set and communicate daily mobility goal to care team and patient/family/caregiver.   - Collaborate with rehabilitation services on mobility goals if consulted  - Perform Range of Motion 3 times a day.  - Reposition patient every 2 hours.  - Dangle patient 3 times a day  - Stand patient 3 times a day  - Ambulate patient 3 times a day  - Out of bed to chair 3 times a day   - Out of bed for meals 3 times a day  - Out of bed for toileting  - Record patient progress and toleration of activity level   Outcome: Progressing     Problem: DISCHARGE PLANNING  Goal: Discharge to home or other facility with appropriate resources  Description: INTERVENTIONS:  - Identify barriers to discharge w/patient and caregiver  - Arrange for needed discharge resources and transportation as appropriate  -  Identify discharge learning needs (meds, wound care, etc.)  - Arrange for interpretive services to assist at discharge as needed  - Refer to Case Management Department for coordinating discharge planning if the patient needs post-hospital services based on physician/advanced practitioner order or complex needs related to functional status, cognitive ability, or social support system  Outcome: Progressing     Problem: Knowledge Deficit  Goal: Patient/family/caregiver demonstrates understanding of disease process, treatment plan, medications, and discharge instructions  Description: Complete learning assessment and assess knowledge base.  Interventions:  - Provide teaching at level of understanding  - Provide teaching via preferred learning methods  Outcome: Progressing     Problem: NEUROSENSORY - ADULT  Goal: Achieves stable or improved neurological status  Description: INTERVENTIONS  - Monitor and report changes in neurological status  - Monitor vital signs such as temperature, blood pressure, glucose, and any other labs ordered   - Initiate measures to prevent increased intracranial pressure  - Monitor for seizure activity and implement precautions if appropriate      Outcome: Progressing  Goal: Remains free of injury related to seizures activity  Description: INTERVENTIONS  - Maintain airway, patient safety  and administer oxygen as ordered  - Monitor patient for seizure activity, document and report duration and description of seizure to physician/advanced practitioner  - If seizure occurs,  ensure patient safety during seizure  - Reorient patient post seizure  - Seizure pads on all 4 side rails  - Instruct patient/family to notify RN of any seizure activity including if an aura is experienced  - Instruct patient/family to call for assistance with activity based on nursing assessment  - Administer anti-seizure medications if ordered    Outcome: Progressing  Goal: Achieves maximal functionality and self  care  Description: INTERVENTIONS  - Monitor swallowing and airway patency with patient fatigue and changes in neurological status  - Encourage and assist patient to increase activity and self care.   - Encourage visually impaired, hearing impaired and aphasic patients to use assistive/communication devices  Outcome: Progressing     Problem: CARDIOVASCULAR - ADULT  Goal: Maintains optimal cardiac output and hemodynamic stability  Description: INTERVENTIONS:  - Monitor I/O, vital signs and rhythm  - Monitor for S/S and trends of decreased cardiac output  - Administer and titrate ordered vasoactive medications to optimize hemodynamic stability  - Assess quality of pulses, skin color and temperature  - Assess for signs of decreased coronary artery perfusion  - Instruct patient to report change in severity of symptoms  Outcome: Progressing  Goal: Absence of cardiac dysrhythmias or at baseline rhythm  Description: INTERVENTIONS:  - Continuous cardiac monitoring, vital signs, obtain 12 lead EKG if ordered  - Administer antiarrhythmic and heart rate control medications as ordered  - Monitor electrolytes and administer replacement therapy as ordered  Outcome: Progressing     Problem: RESPIRATORY - ADULT  Goal: Achieves optimal ventilation and oxygenation  Description: INTERVENTIONS:  - Assess for changes in respiratory status  - Assess for changes in mentation and behavior  - Position to facilitate oxygenation and minimize respiratory effort  - Oxygen administered by appropriate delivery if ordered  - Initiate smoking cessation education as indicated  - Encourage broncho-pulmonary hygiene including cough, deep breathe, Incentive Spirometry  - Assess the need for suctioning and aspirate as needed  - Assess and instruct to report SOB or any respiratory difficulty  - Respiratory Therapy support as indicated  Outcome: Progressing     Problem: GASTROINTESTINAL - ADULT  Goal: Maintains or returns to baseline bowel  function  Description: INTERVENTIONS:  - Assess bowel function  - Encourage oral fluids to ensure adequate hydration  - Administer IV fluids if ordered to ensure adequate hydration  - Administer ordered medications as needed  - Encourage mobilization and activity  - Consider nutritional services referral to assist patient with adequate nutrition and appropriate food choices  Outcome: Progressing  Goal: Maintains adequate nutritional intake  Description: INTERVENTIONS:  - Monitor percentage of each meal consumed  - Identify factors contributing to decreased intake, treat as appropriate  - Assist with meals as needed  - Monitor I&O, weight, and lab values if indicated  - Obtain nutrition services referral as needed  Outcome: Progressing     Problem: GENITOURINARY - ADULT  Goal: Maintains or returns to baseline urinary function  Description: INTERVENTIONS:  - Assess urinary function  - Encourage oral fluids to ensure adequate hydration if ordered  - Administer IV fluids as ordered to ensure adequate hydration  - Administer ordered medications as needed  - Offer frequent toileting  - Follow urinary retention protocol if ordered  Outcome: Progressing  Goal: Absence of urinary retention  Description: INTERVENTIONS:  - Assess patient's ability to void and empty bladder  - Monitor I/O  - Bladder scan as needed  - Discuss with physician/AP medications to alleviate retention as needed  - Discuss catheterization for long term situations as appropriate  Outcome: Progressing     Problem: METABOLIC, FLUID AND ELECTROLYTES - ADULT  Goal: Electrolytes maintained within normal limits  Description: INTERVENTIONS:  - Monitor labs and assess patient for signs and symptoms of electrolyte imbalances  - Administer electrolyte replacement as ordered  - Monitor response to electrolyte replacements, including repeat lab results as appropriate  - Instruct patient on fluid and nutrition as appropriate  Outcome: Progressing  Goal: Fluid balance  maintained  Description: INTERVENTIONS:  - Monitor labs   - Monitor I/O and WT  - Instruct patient on fluid and nutrition as appropriate  - Assess for signs & symptoms of volume excess or deficit  Outcome: Progressing     Problem: SKIN/TISSUE INTEGRITY - ADULT  Goal: Skin Integrity remains intact(Skin Breakdown Prevention)  Description: Assess:  -Perform Flavio assessment every shift   -Clean and moisturize skin every day   -Inspect skin when repositioning, toileting, and assisting with ADLS  -Assess under medical devices such as ronny  every hour   -Assess extremities for adequate circulation and sensation     Bed Management:  -Have minimal linens on bed & keep smooth, unwrinkled  -Change linens as needed when moist or perspiring  -Avoid sitting or lying in one position for more than 2 hours while in bed  -Keep HOB at 45 degrees     Toileting:  -Offer bedside commode  -Assess for incontinence every hour  -Use incontinent care products after each incontinent episode such as moisture barrier cream     Activity:  -Mobilize patient 3 times a day  -Encourage activity and walks on unit  -Encourage or provide ROM exercises   -Turn and reposition patient every 2 Hours  -Use appropriate equipment to lift or move patient in bed  -Instruct/ Assist with weight shifting every hour when out of bed in chair  -Consider limitation of chair time 2 hour intervals    Skin Care:  -Avoid use of baby powder, tape, friction and shearing, hot water or constrictive clothing  -Relieve pressure over bony prominences using allevyn   -Do not massage red bony areas    Next Steps:  -Teach patient strategies to minimize risks such as weight shifting    -Consider consults to  interdisciplinary teams such as PT  Outcome: Progressing  Goal: Incision(s), wounds(s) or drain site(s) healing without S/S of infection  Description: INTERVENTIONS  - Assess and document dressing, incision, wound bed, drain sites and surrounding tissue  - Provide patient  and family education  - Perform skin care/dressing changes every day  Outcome: Progressing     Problem: HEMATOLOGIC - ADULT  Goal: Maintains hematologic stability  Description: INTERVENTIONS  - Assess for signs and symptoms of bleeding or hemorrhage  - Monitor labs  - Administer supportive blood products/factors as ordered and appropriate  Outcome: Progressing     Problem: MUSCULOSKELETAL - ADULT  Goal: Maintain or return mobility to safest level of function  Description: INTERVENTIONS:  - Assess patient's ability to carry out ADLs; assess patient's baseline for ADL function and identify physical deficits which impact ability to perform ADLs (bathing, care of mouth/teeth, toileting, grooming, dressing, etc.)  - Assess/evaluate cause of self-care deficits   - Assess range of motion  - Assess patient's mobility  - Assess patient's need for assistive devices and provide as appropriate  - Encourage maximum independence but intervene and supervise when necessary  - Involve family in performance of ADLs  - Assess for home care needs following discharge   - Consider OT consult to assist with ADL evaluation and planning for discharge  - Provide patient education as appropriate  Outcome: Progressing     Problem: COPING  Goal: Pt/Family able to verbalize concerns and demonstrate effective coping strategies  Description: INTERVENTIONS:  - Assist patient/family to identify coping skills, available support systems and cultural and spiritual values  - Provide emotional support, including active listening and acknowledgement of concerns of patient and caregivers  - Reduce environmental stimuli, as able  - Provide patient education  - Assess for spiritual pain/suffering and initiate spiritual care, including notification of Pastoral Care or [place] based community as needed  - Assess effectiveness of coping strategies  Outcome: Progressing  Goal: Will report anxiety at manageable levels  Description: INTERVENTIONS:  - Administer  medication as ordered  - Teach and encourage coping skills  - Provide emotional support  - Assess patient/family for anxiety and ability to cope  Outcome: Progressing     Problem: Potential for Falls  Goal: Patient will remain free of falls  Description: INTERVENTIONS:  - Educate patient/family on patient safety including physical limitations  - Instruct patient to call for assistance with activity   - Consult OT/PT to assist with strengthening/mobility   - Keep Call bell within reach  - Keep bed low and locked with side rails adjusted as appropriate  - Keep care items and personal belongings within reach  - Initiate and maintain comfort rounds  - Make Fall Risk Sign visible to staff  - Offer Toileting every 2 Hours, in advance of need  - Initiate/Maintain bed alarm  - Obtain necessary fall risk management equipment: non skid footwear  - Apply yellow socks and bracelet for high fall risk patients  - Consider moving patient to room near nurses station  Outcome: Progressing

## 2024-01-26 NOTE — CASE MANAGEMENT
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    COVID 2024    Stage 3b chronic kidney disease (CKD) ([facility]) 2024    Abnormal CT of the head 2024    Nephrolithiasis 2021    Other specified anxiety disorders 2019    Reactive depression 2019    Hidradenitis suppurativa of left axilla 2019    Anxiety state 2018    Luetscher's syndrome 2018    Disorder of parathyroid gland ([facility]) 2018    Eczema 2018    Gastroenteritis 2018    Grand mal status ([facility]) 2018    Hypercalcemia 2018    Hypertensive disorder 2018    Impacted cerumen 2018    Open wound of hand except fingers 2018    Otitis externa 2018    Overweight 2018    Pain in face 2018    Skin sensation disturbance 2018    Subjective visual disturbance 2018    Encounter for gynecological examination (general) (routine) without abnormal findings 10/23/2018    Long term current use of hormonal contraceptive 10/23/2018    Rosacea 2015      LOS (days): 16  Geometric Mean LOS (GMLOS) (days):   Days to GMLOS:     OBJECTIVE:  Risk of Unplanned Readmission Score: 18.77         Current admission status: Inpatient   Preferred Pharmacy:   [id number] [place] [facility] - [address] [place] [address]  [place] [id number]  Phone: [phone] Fax: [phone]    [facility] - [place] [id number]  [address]  Phone: [phone] Fax: [phone]    Primary Care Provider: Tony Guevara MD    Primary Insurance: [employer]  Secondary Insurance: MEDICARE    DISCHARGE DETAILS:             Additional Comments: Pt will remain IP through the weekend. Pending pulm consult and o2 stability. Pt vasilates between 9-15lpm

## 2024-01-26 NOTE — CONSULTS
PULMONOLOGY CONSULT NOTE     Name: Carla Hunt   Age & Sex: 58 y.o. female   MRN: [id number]  Unit/Bed#: [facility] 726-01   Encounter: [id number]        Reason for consultation: Ongoing hypoxia in the setting of recent COVID, updated CT with possible pneumonia and multifocal bronchodilation    Requesting physician: Ro Hamilton MD    Assessment:    Bilateral groundglass opacification on CT with associated multifocal bronchial dilation  Possible RLL pneumonia as seen on CT  Severe COVID, s/p treatment with remdesivir, Decadron, Actemra  Seizure disorder s/p temporal lobectomy, maintained on Lamictal/Topamax  Teto marginal zone B-cell lymphoma, on chemotherapy  CKD stage 3b  Encephalopathy, likely in the setting of #3, now resolved    Plan/Recommendations:  At this juncture, findings on CT could be representative of post-COVID changes; however, given immunosuppressed state as well as recent infection, there remains the possibility for post-viral versus superimposed bacterial pneumonia  Patient has been afebrile, but does remain tachycardic with fairly high O2 requirements and soft blood pressures - possibly indicative of superimposed infection  If findings on CT are felt to be secondary to COVID, may opt for watchful waiting with follow-up imaging; however, if any concern for infection, would have low threshold for re-involving ID team  Given ongoing O2 requirements, an argument could be made to consider bronchoscopy for further evaluation; if warranted, this would likely not occur until early next week  Less concerned about pulmonary fibrosis at this juncture given lack of risk factors (other than bendamustine use, of which this is a rare complication), no history of lung or rheumatologic disease, or other manifestations; would defer further workup in this regard   Monitor respiratory status and wean supplemental O2 as able  Rest of care as per primary team    Above plan and recommendations are  preliminary pending final attestation from attending physician, Dr. Stokes    History of Present Illness   HPI:  Carla Hunt is a 58 y.o. female with PMHx of CKD 3, epilepsy status post left anterior temporal lobe resection (2007) maintained on Topamax and Lamictal, hyperlipidemia, B-cell lymphoma on chemotherapy, recent COVID infection who initially presented to [facility] on 1/8 with concerns for strokelike symptoms.  Initial evaluation in the ED there revealed a possible SAH; however, it was felt that she could initially be managed safely at [facility] without aggressive neurosurgical intervention.  Initial workup also concerning for possible lacunar infarct as well as possible breakthrough seizures.  While there, she was noted to be COVID-positive and developed worsening encephalopathy as well as increasing oxygen requirements.  She was ultimately started on the severe COVID pathway and received treatment with Decadron, full-strength Lovenox, Actemra, and remdesivir.  Given the patient's ongoing encephalopathy, and in consultation with neurology, the decision was made to transfer the patient to [facility] for video EEG as well as ongoing evaluation for meningitis/encephalitis.    On arrival at [facility] on 1/10, patient was initially managed in the ICU.  During her time there, she underwent multiple LPs the results from which did not reveal any acute causes of her encephalopathy.  As above, she completed treatment on the severe COVID pathway with slight improvement in her oxygen requirements (initially as high as 15 L on MFNC), and she was eventually transferred to the floors for further care.  Despite her encephalopathy improving and having completed treatment for her COVID, the patient's oxygen requirements remain elevated (4 L at rest, 8 L with exertion) and thus repeat CT imaging was obtained; this showed extensive bilateral groundglass opacification and multifocal consolidation in the right lung, greatest  in the right lower lobe suspicious for pneumonia.  Additionally, it showed mild multifocal bronchial dilation concerning for developing pulmonary fibrosis versus the sequela of recent COVID-19 pneumonia.  Given these findings, the patient was restarted on ceftriaxone by her primary team and pulmonology has been consulted for further evaluation.    Patient subsequently seen and examined.  At this time, she endorses no acute complaints.  Since the resolution of her encephalopathy, she reports that she has felt essentially back to her baseline aside from her increased oxygen requirements.  He denies any significant shortness of breath at rest, though does report ongoing symptoms with exertion and ambulation.  Prior to this, she denies any history of lung disease, both for herself and her family.  She also does not carry any history of rheumatologic or autoimmune diseases; no history of arthralgias, dry eyes/mouth, Raynaud's, kidney disease other than her own.  She has never required oxygen previously.  Despite the findings on her most recent CTA, the patient continues to deny any infectious symptoms including cough, fevers/chills, or sputum production.  She is very hopeful to go home sooner rather than later.  She otherwise denies any chest pain, abdominal pain, nausea/vomiting, weakness, or other symptoms.    Patient reports she has never smoked. 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 She received treatment with bendamustine and rituximab and is currently on maintenance Rituxin Hycela.    Review of systems:  12 point review of systems was completed and was otherwise negative except as listed in HPI.      Historical Information   Past Medical History:   Diagnosis Date    Hx of hypercalcemia     Lymphoma (HCC) 2021    Parathyroid disease (HCC)     Seizures (HCC)     Situational depression     24 yr old son  from drug overdose      Past Surgical History:   Procedure Laterality Date    CRANIOTOMY FOR TEMPORAL LOBECTOMY Left 2007     Family History   Problem Relation Age of Onset    Diabetes Mother     Cancer Father        Occupational History: Housewife    Social History: as below  Social History     Tobacco Use   Smoking Status Never   Smokeless Tobacco Never         Meds/Allergies   Current Facility-Administered Medications   Medication Dose Route Frequency    acetaminophen (TYLENOL) tablet 650 mg  650 mg Oral Q6H PRN    bisacodyl (DULCOLAX) rectal suppository 10 mg  10 mg Rectal Daily PRN    cefTRIAXone (ROCEPHIN) 1,000 mg in dextrose 5 % 50 mL IVPB  1,000 mg Intravenous Q24H    chlorhexidine (PERIDEX) 0.12 % oral rinse 15 mL  15 mL Mouth/Throat Q12H SARTHAK    enoxaparin (LOVENOX) subcutaneous injection 40 mg  40 mg Subcutaneous Daily    insulin lispro (HumaLOG) 100 units/mL subcutaneous injection 2-12 Units  2-12 Units Subcutaneous TID With Meals    insulin lispro (HumaLOG) 100 units/mL subcutaneous injection 2-12 Units  2-12 Units Subcutaneous HS    lamoTRIgine (LaMICtal) tablet 150 mg  150 mg Oral BID    melatonin tablet 6 mg  6 mg Oral HS    metoprolol (LOPRESSOR) injection 2.5 mg  2.5 mg Intravenous Q6H PRN    ondansetron (ZOFRAN) injection 4 mg  4 mg Intravenous Q6H PRN    polyethylene glycol (MIRALAX) packet 17 g  17 g Oral Daily    QUEtiapine (SEROquel) tablet 25 mg  25 mg Oral HS    senna-docusate sodium (SENOKOT S) 8.6-50 mg per tablet 2 tablet  2 tablet Oral BID    topiramate (TOPAMAX) tablet 100 mg  100 mg Oral BID    venlafaxine (EFFEXOR) tablet 25 mg  25 mg Oral TID With Meals     Medications Prior to Admission   Medication    busPIRone (BUSPAR) 5 mg tablet    escitalopram (LEXAPRO) 10 mg tablet    ibuprofen (MOTRIN) 600 mg tablet    lamoTRIgine (LaMICtal) 200 MG tablet    lamoTRIgine (LaMICtal) 200 MG tablet    medroxyPROGESTERone acetate (DEPO-PROVERA SYRINGE) 150 mg/mL injection    ondansetron (ZOFRAN-ODT) 4 mg  "disintegrating tablet    topiramate (TOPAMAX) 100 mg tablet    topiramate (TOPAMAX) 100 mg tablet    venlafaxine (EFFEXOR-XR) 75 mg 24 hr capsule     No Known Allergies    Vitals: Blood pressure 91/51, pulse 104, temperature 99.6 °F (37.6 °C), resp. rate 20, height 5' 8\" (1.727 m), weight 75.3 kg (166 lb), SpO2 93%., Body mass index is 25.24 kg/m².    No intake or output data in the 24 hours ending 01/26/24 1158    Physical Exam  Vitals and nursing note reviewed.   Constitutional:       General: She is not in acute distress.     Appearance: Normal appearance. She is not ill-appearing.   HENT:      Head: Normocephalic and atraumatic.      Right Ear: External ear normal.      Left Ear: External ear normal.      Nose: Nose normal.      Comments: NC in place with mild ongoing epistaxis     Mouth/Throat:      Mouth: Mucous membranes are moist.      Pharynx: Oropharynx is clear.   Eyes:      Extraocular Movements: Extraocular movements intact.      Conjunctiva/sclera: Conjunctivae normal.      Pupils: Pupils are equal, round, and reactive to light.   Cardiovascular:      Rate and Rhythm: Regular rhythm. Tachycardia present.      Heart sounds: Normal heart sounds. No murmur heard.  Pulmonary:      Effort: No respiratory distress.      Breath sounds: Normal breath sounds.      Comments: Overall poor inspiratory effort, though breath sounds grossly normal  Abdominal:      General: Abdomen is flat. Bowel sounds are normal. There is no distension.      Palpations: Abdomen is soft.      Tenderness: There is no abdominal tenderness.   Musculoskeletal:      Right lower leg: No edema.      Left lower leg: No edema.   Skin:     General: Skin is warm and dry.      Capillary Refill: Capillary refill takes less than 2 seconds.      Findings: Bruising present.      Comments: Multiple areas of bruising noted on bilateral upper extremities   Neurological:      Mental Status: She is alert and oriented to person, place, and time. Mental " "status is at baseline.   Psychiatric:         Mood and Affect: Mood normal.         Behavior: Behavior normal.         Labs: I have personally reviewed pertinent lab results., ABG: No results found for: \"PHART\", \"PXV3QYK\", \"PO2ART\", \"OZX6BJH\", \"U5YUBIXP\", \"BEART\", \"SOURCE\", BNP: No results found for: \"BNP\", CBC:   Lab Results   Component Value Date    WBC 4.28 (L) 01/26/2024    HGB 11.2 (L) 01/26/2024    HCT 34.7 (L) 01/26/2024    MCV 94 01/26/2024     01/26/2024    RBC 3.71 (L) 01/26/2024    MCH 30.2 01/26/2024    MCHC 32.3 01/26/2024    RDW 17.7 (H) 01/26/2024    MPV 11.9 01/26/2024   , CMP:   Lab Results   Component Value Date    SODIUM 140 01/26/2024    K 4.2 01/26/2024     (H) 01/26/2024    CO2 26 01/26/2024    BUN 24 01/26/2024    CREATININE 1.04 01/26/2024    CALCIUM 8.7 01/26/2024    EGFR 59 01/26/2024   , PT/INR: No results found for: \"PT\", \"INR\", Troponin: No results found for: \"TROPONINI\"  Laboratory and Diagnostics  Results from last 7 days   Lab Units 01/26/24  0454 01/25/24  1739 01/23/24  0504 01/22/24  0618 01/21/24  0505 01/20/24  0426   WBC Thousand/uL 4.28* 5.65 7.94 10.38* 7.83 7.95   HEMOGLOBIN g/dL 11.2* 12.4 11.9 12.6 12.2 11.7   HEMATOCRIT % 34.7* 39.0 38.1 39.6 37.9 37.5   PLATELETS Thousands/uL 161 189 228 244 230 256   BANDS PCT %  --  13*  --  5  --   --    MONO PCT %  --  2*  --  3*  --   --    EOS PCT %  --  0  --  0  --   --      Results from last 7 days   Lab Units 01/26/24  0454 01/25/24  1739 01/23/24  0504 01/22/24  0618 01/21/24  0505 01/20/24  0426   SODIUM mmol/L 140 136 140 140 143 140   POTASSIUM mmol/L 4.2 3.8 4.7 4.7 4.4 4.2   CHLORIDE mmol/L 111* 103 107 107 109* 106   CO2 mmol/L 26 27 27 27 28 29   ANION GAP mmol/L 3 6 6 6 6 5   BUN mg/dL 24 35* 34* 34* 28* 28*   CREATININE mg/dL 1.04 1.14 1.08 1.05 1.00 0.91   CALCIUM mg/dL 8.7 9.3 9.5 9.7 9.5 9.4   GLUCOSE RANDOM mg/dL 83 112 101 139 103 103   ALT U/L  --   --  33  --  48 52   AST U/L  --   --  18  --  25 35 "   ALK PHOS U/L  --   --  87  --  89 99   ALBUMIN g/dL  --   --  3.4*  --  3.4* 3.4*   TOTAL BILIRUBIN mg/dL  --   --  0.58  --  0.57 0.53                                       Results from last 7 days   Lab Units 01/25/24  1739   PROCALCITONIN ng/ml 0.17         Imaging and other studies: I have personally reviewed pertinent reports.    XR chest portable ICU    Result Date: 1/15/2024  Impression: Worsening diffuse bilateral airspace opacities. Workstation performed: [id number]     XR chest portable ICU    Result Date: 1/15/2024  Impression: Persistent bilateral multi lobar opacities. Workstation performed: [id number]     EEG Video Monitoring 24 Hour    Addendum Date: 1/14/2024    Correction on the total time of this study. The total monitoring period for this report is nearly 22.5 hours.     XR chest portable ICU    Result Date: 1/12/2024  Impression: Worsening left-sided pulmonary opacities concerning for multi lobar infiltrates. Workstation performed: [id number]     XR chest portable ICU    Result Date: 1/11/2024  Impression: 1. Persistence of bilateral hazy diffuse airspace opacities in the lungs, without significant change, again suspicious for pneumonia 2. Nasogastric tube extends below left hemidiaphragm Workstation performed: [id number]     MRI brain seizure wo and w contrast    Result Date: 1/11/2024  Impression: Previously visualized diffusion signal abnormality in the right basal ganglia is no longer identified and may have been artifactual given underlying susceptibility artifact in the bilateral globus pallidus. Workstation performed: [id number]         Pulmonary function testing: none of file    EKG, Pathology, and Other Studies: I have personally reviewed pertinent reports.      Code Status: Level 1 - Full Code    VTE Pharmacologic Prophylaxis: Enoxaparin (Lovenox)  VTE Mechanical Prophylaxis: sequential compression device    Nilson Barba MD  Internal Medicine PGY-1

## 2024-01-26 NOTE — ASSESSMENT & PLAN NOTE
Admitted with COVID multifocal pneumonia.  Completed 7 days of IV ceftriaxone  Patient with continued hypoxia requiring 4-8 L of oxygen  She currently reports feeling well and denies cough and shortness of breath  CTA chest with no PE, but showed extensive bilateral groundglass opacity with multifocal consolidation in the right lung, greatest in the right lower lobe, compatible with pneumonia.   Procalcitonin mildly elevated  Continue ceftriaxone  Pulmonology following  Start azithromycin  ID consulted for recommendations regarding antibiotics and atypical coverage

## 2024-01-26 NOTE — OCCUPATIONAL THERAPY NOTE
Occupational Therapy Progress Note     Patient Name: Carla Hunt  Today's Date: 1/26/2024  Problem List  Active Problems:    Anxiety state    COVID    Stage 3b chronic kidney disease (CKD) (HCC)    Acute respiratory failure with hypoxia (HCC)    Teto marginal zone B-cell lymphoma (HCC)    Seizure disorder (HCC)    Pneumonia     01/26/24 0945   OT Last Visit   OT Visit Date 01/26/24   Note Type   Note Type Treatment   Pain Assessment   Pain Assessment Tool 0-10   Pain Score No Pain   Hospital Pain Intervention(s) Ambulation/increased activity;Repositioned;Emotional support;Rest   Restrictions/Precautions   Weight Bearing Precautions Per Order No   Other Precautions O2;Fall Risk;Telemetry;Cognitive;Chair Alarm;Bed Alarm   Lifestyle   Autonomy I adls and mobility - i iadls - shares homemaking with family   Reciprocal Relationships supportive family   Service to Others volunteers at [employer] organization   Intrinsic Gratification sedentary   ADL   Where Assessed Standing at sink  (and seated with frequent rest breaks, desaturating to 84% on 10L 02 with cues for deep/pursued lip breathing pt rebounding to 88%, 92% once seated in chair, pt asymptomatic with session.)   Grooming Assistance 5  Supervision/Setup   Grooming Deficit Wash/dry face   UB Bathing Assistance 5  Supervision/Setup   UB Bathing Deficit Setup;Increased time to complete   LB Bathing Assistance 4  Minimal Assistance   LB Bathing Deficit Steadying;Buttocks   UB Dressing Assistance 4  Minimal Assistance   UB Dressing Deficit Setup   LB Dressing Assistance 4  Minimal Assistance   LB Dressing Deficit Thread RLE into underwear;Thread LLE into underwear;Steadying   Functional Standing Tolerance   Time approx~5 minutes with self care tasks at sink   Bed Mobility   Supine to Sit Unable to assess   Sit to Supine Unable to assess   Transfers   Sit to Stand 5  Supervision   Stand to Sit 5  Supervision   Additional Comments +RW and cues for sfaety   Functional  Mobility   Functional Mobility 4  Minimal assistance   Additional Comments nicky x 1 with RW short distance functional mobility from bathroom>chair with RW management cues (lifting RW off of floor) pivoting far from chair and stepping backwards vs forward to increase safety/decrease fall risk. Impaired activity tolerance.   Additional items Rolling walker   Toilet Transfers   Toilet Transfer From Rolling walker   Toilet Transfer Type To and from   Toilet Transfer to Standard bedside commode   Toilet Transfer Technique Ambulating   Toilet Transfers Minimal assistance   Cognition   Overall Cognitive Status (S)  Impaired   Arousal/Participation Alert;Responsive;Cooperative   Attention Attends with cues to redirect   Orientation Level Oriented X4   Memory Decreased recall of precautions;Decreased recall of recent events;Decreased short term memory   Following Commands Follows one step commands with increased time or repetition   Comments (S)  pt pleasant and coopeartive, oriented, slow processing with session, performed Angel Cognitive assessment standardized, evidence based cognitive test. pt scored a 4.4 indicating a recommendation of live with someone with daily assistance/may be alone at times during the day, pt/spouse updated on recommendations including to NOT drive a motorized vehicle-see below   Activity Tolerance   Activity Tolerance Patient limited by fatigue;Other (Comment)  (+10L 02, destaturation with session)   Medical Staff Made Aware OT Lisandra   Assessment   Assessment Patient participated in Skilled OT session this date with interventions consisting of self care tasks, functional transfers/mobility, ACLS assessment . Patient agreeable to OT treatment session, upon arrival patient was found seated OOB to Chair with spouse present.  In comparison to previous session, patient with improvements in functional transfers . Patient requiring verbal cues for safety, verbal cues for correct technique, and verbal  cues for pacing thru activity steps. Patient continues to be functioning below baseline level, occupational performance remains limited secondary to factors listed above and increased risk for falls and injury.  The patient's raw score on the -PAC Daily Activity Inpatient Short Form is 18. A raw score of less than 19 suggests the patient may benefit from discharge to post-acute rehabilitation services. Please refer to the recommendation of the occupational Therapist for safe discharge planning.  From OT standpoint, recommendation at time of d/c would be minimum level I with focus on cognition/fit to test driving  Patient to benefit from continued Occupational Therapy treatment while in the hospital to address deficits as defined above and maximize level of functional independence with ADLs and functional mobility.   Plan   Treatment Interventions ADL retraining;Functional transfer training;UE strengthening/ROM;Endurance training;Cognitive reorientation;Patient/family training;Equipment evaluation/education;Compensatory technique education;Energy conservation;Activityengagement;Continued evaluation   Goal Expiration Date 01/29/24   OT Treatment Day 2   OT Frequency 3-5x/wk   Discharge Recommendation   Rehab Resource Intensity Level, OT III minimum resource level with focus on cognition, attention, memory, multi-step direction follow and problem solving, and Fit to test driving.   -PAC Daily Activity Inpatient   Lower Body Dressing 3   Bathing 3   Toileting 3   Upper Body Dressing 3   Grooming 3   Eating 3   Daily Activity Raw Score 18   Daily Activity Standardized Score (Calc for Raw Score >=11) 38.66   AM-PAC Applied Cognition Inpatient   Following a Speech/Presentation 2   Understanding Ordinary Conversation 4   Taking Medications 3   Remembering Where Things Are Placed or Put Away 3   Remembering List of 4-5 Errands 2   Taking Care of Complicated Tasks 2   Applied Cognition Raw Score 16   Applied Cognition  Standardized Score 35.03   Angel Cognitive Level   Angel Cognitive Level Score 4.4   Angel Cognitive Score Recommendation Daily checks, maybe alone part of the day   Angel Cognitive Level Comments pt's deficits noted in multi-step direction following and impaired attention/STM with advanced/complex steps. Educated pt/spouse on score and indications for discharge with emplhasis on Supervison with medication management, hot stove prep/sharp tools, and should NOT operate a motorized vehicle. Pt/spouse receptive and spouse reports will be home with pt first two weeks s/p discharge and neighbors will be able to check in daily with pt when he is at work.   End of Consult   Education Provided Yes;Family or social support of family present for education by provider  (pt/spouse educated on ACLS score and cognitive/S and safety recommendation for discharge. pt issued functional cogntive tasks including activity booklet #2, spot the difference in 2 pictures, maze task for cognitive therapy. pt receptive.)   Patient Position at End of Consult Bed/Chair alarm activated;All needs within reach;Bedside chair   Nurse Communication Nurse aware of consult      Sasha Marshall MOT, OTR/L

## 2024-01-26 NOTE — UTILIZATION REVIEW
Continued Stay Review    Date:   1/26/24                          Current Patient Class:  Inpatient  Current Level of Care:  med surg    HPI:58 y.o. female initially admitted on  1/10/24 with acute respiratory failure  with hypoxia    Assessment/Plan:   1/26    Pulmonary consult  Possible post viral  vs superimposed bacterail pneumonia.  Remains tachycardic, fairly high O2  requirements.   Remain  on  SATINDER.  No risk factors for pulmonary fibrosis.   Monitor respiratory status.     Continue  PT/OT.   Continue current meds.     Vital Signs:     104 -- -- -- 93 % -- -- -- -- --     01/26/24 0901 -- -- -- -- -- 91 % -- 80 15 L/min Mid flow nasal cannula --   01/26/24 0800 -- -- -- -- -- -- -- 68 12 L/min Mid flow nasal cannula --   01/26/24 0759 -- -- 20 -- -- -- -- 56 9 L/min -- Lying   01/26/24 0400 -- -- -- -- -- 93 % -- 44 6 L/min Nasal cannula --   01/26/24 0000 -- -- -- -- -- -- -- 44 6 L/min Nasal cannula --       Pertinent Labs/Diagnostic Results:       Results from last 7 days   Lab Units 01/26/24 0454 01/25/24 1739 01/23/24 0504 01/22/24 0618 01/21/24  0505   WBC Thousand/uL 4.28* 5.65 7.94 10.38* 7.83   HEMOGLOBIN g/dL 11.2* 12.4 11.9 12.6 12.2   HEMATOCRIT % 34.7* 39.0 38.1 39.6 37.9   PLATELETS Thousands/uL 161 189 228 244 230   BANDS PCT %  --  13*  --  5  --          Results from last 7 days   Lab Units 01/26/24 0454 01/25/24 1739 01/23/24  0504 01/22/24 0618 01/21/24  0505   SODIUM mmol/L 140 136 140 140 143   POTASSIUM mmol/L 4.2 3.8 4.7 4.7 4.4   CHLORIDE mmol/L 111* 103 107 107 109*   CO2 mmol/L 26 27 27 27 28   ANION GAP mmol/L 3 6 6 6 6   BUN mg/dL 24 35* 34* 34* 28*   CREATININE mg/dL 1.04 1.14 1.08 1.05 1.00   EGFR ml/min/1.73sq m 59 53 57 59 62   CALCIUM mg/dL 8.7 9.3 9.5 9.7 9.5       Results from last 7 days   Lab Units 01/26/24  1113 01/26/24  0558 01/25/24  2122 01/25/24  1617 01/25/24  1050 01/25/24  0707 01/24/24  2124 01/24/24  1646 01/24/24  1044 01/24/24  0640 01/23/24  2136  01/23/24  1606   POC GLUCOSE mg/dl 154* 94 97 130 126 89 95 159* 141* 91 125 156*     Results from last 7 days   Lab Units 01/26/24  0454 01/25/24  1739 01/23/24  0504 01/22/24  0618 01/21/24  0505 01/20/24  0426   GLUCOSE RANDOM mg/dL 83 112 101 139 103 103           Results from last 7 days   Lab Units 01/25/24  1739   PROCALCITONIN ng/ml 0.17           Results from last 7 days   Lab Units 01/25/24  2120 01/25/24  1732   BLOOD CULTURE   --  Received in Microbiology Lab. Culture in Progress.  Received in Microbiology Lab. Culture in Progress.   SPUTUM CULTURE  Culture too young- will reincubate  --    GRAM STAIN RESULT  Rare Epithelial cells per low power field  No Polys or Bacteria seen  --                    Medications:   Scheduled Medications:  cefTRIAXone, 1,000 mg, Intravenous, Q24H  chlorhexidine, 15 mL, Mouth/Throat, Q12H SARTHAK  enoxaparin, 40 mg, Subcutaneous, Daily  insulin lispro, 2-12 Units, Subcutaneous, TID With Meals  insulin lispro, 2-12 Units, Subcutaneous, HS  lamoTRIgine, 150 mg, Oral, BID  melatonin, 6 mg, Oral, HS  polyethylene glycol, 17 g, Oral, Daily  QUEtiapine, 25 mg, Oral, HS  senna-docusate sodium, 2 tablet, Oral, BID  topiramate, 100 mg, Oral, BID  venlafaxine, 25 mg, Oral, TID With Meals      Continuous IV Infusions:     PRN Meds:  acetaminophen, 650 mg, Oral, Q6H PRN  bisacodyl, 10 mg, Rectal, Daily PRN  metoprolol, 2.5 mg, Intravenous, Q6H PRN  ondansetron, 4 mg, Intravenous, Q6H PRN        Discharge Plan:   D    Network Utilization Review Department  ATTENTION: Please call with any questions or concerns to [phone] and carefully listen to the prompts so that you are directed to the right person. All voicemails are confidential.   For Discharge needs, contact Care Management DC Support Team at [phone] opt. [phone]  Send all requests for admission clinical reviews, approved or denied determinations and any other requests to dedicated fax number below belonging to the campus where  the patient is receiving treatment. List of dedicated fax numbers for the Facilities:  FACILITY NAME UR FAX NUMBER   ADMISSION DENIALS (Administrative/Medical Necessity) [phone]   DISCHARGE SUPPORT TEAM (NETWORK) [phone]   PARENT CHILD HEALTH (Maternity/NICU/Pediatrics) [phone]   [facility] [phone]   [facility] [phone]   [facility] [phone]   [facility] [phone]   [facility] [phone]   [facility] [phone]   [facility] [phone]   [facility] [phone]   [facility] [phone]   [facility] [phone]   [facility] [phone]   [facility] [phone]

## 2024-01-26 NOTE — ASSESSMENT & PLAN NOTE
Secondary to severe COVID and multifocal pna  Completed 7 days of IV Ceftriaxone on admission for superimposed bacterial pneumonia  Continue to wean O2 as able  RT O2 evaluation revealed patient requiring 4 L at rest and 8 L with exertion  Incentive spirometer  CT chest with pneumonia and airspace opacities COVID versus pulmonary fibrosis  Discussed with pulmonology-low suspicion of pulmonary fibrosis.  Symptoms likely secondary to COVID and possible pneumonia.  Plan to trial Lasix and order TTE  Continue ceftriaxone.  ID consulted for antibiotic recommendations

## 2024-01-26 NOTE — PROGRESS NOTES
[facility]  Progress Note  Name: Carla Hunt I  MRN: [id number]  Unit/Bed#: [facility] 726-01 I Date of Admission: 1/10/2024   Date of Service: 1/26/2024 I Hospital Day: 16    Assessment/Plan   Pneumonia  Assessment & Plan  Admitted with COVID multifocal pneumonia.  Completed 7 days of IV ceftriaxone  Patient with continued hypoxia requiring 4-8 L of oxygen  She currently reports feeling well and denies cough and shortness of breath  CTA chest with no PE, but showed extensive bilateral groundglass opacity with multifocal consolidation in the right lung, greatest in the right lower lobe, compatible with pneumonia.   Procalcitonin mildly elevated  Continue ceftriaxone  Pulmonology following  Start azithromycin  ID consulted for recommendations regarding antibiotics and atypical coverage    Seizure disorder (HCC)  Assessment & Plan  S/p Temporal lobectomy in 2007  Continue home Lamictal and Topamax    Teto marginal zone B-cell lymphoma (HCC)  Assessment & Plan  Outpatient f/u    Acute respiratory failure with hypoxia ([facility])  Assessment & Plan  Secondary to severe COVID and multifocal pna  Completed 7 days of IV Ceftriaxone on admission for superimposed bacterial pneumonia  Continue to wean O2 as able  RT O2 evaluation revealed patient requiring 4 L at rest and 8 L with exertion  Incentive spirometer  CT chest with pneumonia and airspace opacities COVID versus pulmonary fibrosis  Discussed with pulmonology-low suspicion of pulmonary fibrosis.  Symptoms likely secondary to COVID and possible pneumonia.  Plan to trial Lasix and order TTE  Continue ceftriaxone.  ID consulted for antibiotic recommendations      Stage 3b chronic kidney disease (CKD) ([facility])  Assessment & Plan  Lab Results   Component Value Date    EGFR 59 01/26/2024    EGFR 53 01/25/2024    EGFR 57 01/23/2024    CREATININE 1.04 01/26/2024    CREATININE 1.14 01/25/2024    CREATININE 1.08 01/23/2024   Cr at baseline    Avoid nephrotoxins    COVID  Assessment & Plan  Started on severe pathway  S/p Actermra 1/9  S/p dexamethasone and remdesivir  Due to patient's immunocompromise state patient will need to have 2 COVID antigen negative results prior to dc Isolation.  Negative results, okay to DC isolation  Wean O2 as tolerated.  Plan as above      Anxiety state  Assessment & Plan  Continue home effexor                VTE Pharmacologic Prophylaxis: VTE Score: 11 High Risk (Score >/= 5) - Pharmacological DVT Prophylaxis Ordered: enoxaparin (Lovenox). Sequential Compression Devices Ordered.    Mobility:   Basic Mobility Inpatient Raw Score: 20  JH-HLM Goal: 6: Walk 10 steps or more  JH-HLM Achieved: 6: Walk 10 steps or more  HLM Goal achieved. Continue to encourage appropriate mobility.    Patient Centered Rounds: I performed bedside rounds with nursing staff today.   Discussions with Specialists or Other Care Team Provider: Pulmonology,     Education and Discussions with Family / Patient:  Patient will update family members.     Total Time Spent on Date of Encounter in care of patient: 35 mins. This time was spent on one or more of the following: performing physical exam; counseling and coordination of care; obtaining or reviewing history; documenting in the medical record; reviewing/ordering tests, medications or procedures; communicating with other healthcare professionals and discussing with patient's family/caregivers.    Current Length of Stay: 16 day(s)  Current Patient Status: Inpatient   Certification Statement: The patient will continue to require additional inpatient hospital stay due to improvement of respiratory status, ID consult, pulmonology evaluation  Discharge Plan: Anticipate discharge in >72 hrs to home.    Code Status: Level 1 - Full Code    Subjective:   No acute events overnight.  Patient reports feeling well this morning.  Even during episodes of O2 sats in the mid low 80s patient reports shortness  of breath, dizziness.  Tolerating oral intake.    Objective:     Vitals:   Temp (24hrs), Av.5 °F (36.9 °C), Min:97.9 °F (36.6 °C), Max:99.6 °F (37.6 °C)    Temp:  [97.9 °F (36.6 °C)-99.6 °F (37.6 °C)] 97.9 °F (36.6 °C)  HR:  [] 91  Resp:  [17-20] 17  BP: ()/(51-71) 113/71  SpO2:  [81 %-93 %] 85 %  Body mass index is 25.24 kg/m².     Input and Output Summary (last 24 hours):     Intake/Output Summary (Last 24 hours) at 2024 1552  Last data filed at 2024 1320  Gross per 24 hour   Intake 720 ml   Output --   Net 720 ml       Physical Exam:   Physical Exam  Vitals and nursing note reviewed.   Constitutional:       General: She is not in acute distress.  HENT:      Head: Normocephalic and atraumatic.   Cardiovascular:      Rate and Rhythm: Normal rate and regular rhythm.   Pulmonary:      Breath sounds: Normal breath sounds. No wheezing or rales.   Musculoskeletal:      Cervical back: Neck supple.      Right lower leg: No edema.      Left lower leg: No edema.   Skin:     General: Skin is warm and dry.   Neurological:      Mental Status: She is alert.   Psychiatric:         Mood and Affect: Mood normal.         Behavior: Behavior normal.              Additional Data:     Labs:  Results from last 7 days   Lab Units 24  1739   WBC Thousand/uL 4.28* 5.65   HEMOGLOBIN g/dL 11.2* 12.4   HEMATOCRIT % 34.7* 39.0   PLATELETS Thousands/uL 161 189   BANDS PCT %  --  13*   LYMPHO PCT %  --  6*   MONO PCT %  --  2*   EOS PCT %  --  0     Results from last 7 days   Lab Units 24  04524  1739 24  0504   SODIUM mmol/L 140   < > 140   POTASSIUM mmol/L 4.2   < > 4.7   CHLORIDE mmol/L 111*   < > 107   CO2 mmol/L 26   < > 27   BUN mg/dL 24   < > 34*   CREATININE mg/dL 1.04   < > 1.08   ANION GAP mmol/L 3   < > 6   CALCIUM mg/dL 8.7   < > 9.5   ALBUMIN g/dL  --   --  3.4*   TOTAL BILIRUBIN mg/dL  --   --  0.58   ALK PHOS U/L  --   --  87   ALT U/L  --   --  33   AST U/L  --    --  18   GLUCOSE RANDOM mg/dL 83   < > 101    < > = values in this interval not displayed.         Results from last 7 days   Lab Units 01/26/24  1113 01/26/24  0558 01/25/24 2122 01/25/24  1617 01/25/24  1050 01/25/24  0707 01/24/24 2124 01/24/24  1646 01/24/24  1044 01/24/24  0640 01/23/24  2136 01/23/24  1606   POC GLUCOSE mg/dl 154* 94 97 130 126 89 95 159* 141* 91 125 156*         Results from last 7 days   Lab Units 01/25/24  1739   PROCALCITONIN ng/ml 0.17       Lines/Drains:  Invasive Devices       Peripheral Intravenous Line  Duration             Peripheral IV 01/25/24 Distal;Right;Ventral (anterior) Forearm 1 day                          Imaging: No pertinent imaging reviewed.    Recent Cultures (last 7 days):   Results from last 7 days   Lab Units 01/25/24 2120 01/25/24  1732   BLOOD CULTURE   --  Received in Microbiology Lab. Culture in Progress.  Received in Microbiology Lab. Culture in Progress.   SPUTUM CULTURE  Culture too young- will reincubate  --    GRAM STAIN RESULT  Rare Epithelial cells per low power field  No Polys or Bacteria seen  --        Last 24 Hours Medication List:   Current Facility-Administered Medications   Medication Dose Route Frequency Provider Last Rate    acetaminophen  650 mg Oral Q6H PRN Laura Rodriguez DO      azithromycin  500 mg Oral Q24H oR Hamilton MD      bisacodyl  10 mg Rectal Daily PRN Laura Rodriguez DO      cefTRIAXone  1,000 mg Intravenous Q24H Ro Hamilton MD 1,000 mg (01/25/24 1730)    chlorhexidine  15 mL Mouth/Throat Q12H [facility] Laura Rodriguez, DO      enoxaparin  40 mg Subcutaneous Daily Laura Rodriguez DO      furosemide  40 mg Intravenous Once Nilson Barba MD      insulin lispro  2-12 Units Subcutaneous TID With Meals Laura Rodriguez, DO      insulin lispro  2-12 Units Subcutaneous HS Laura Rodriguez DO      lamoTRIgine  150 mg Oral BID Laura Rodriguez, DO      melatonin  6 mg Oral HS Laura Rodriguez DO      metoprolol  2.5 mg  Intravenous Q6H PRN Ro Hamilton MD      ondansetron  4 mg Intravenous Q6H PRN Laura Rodriguez,       polyethylene glycol  17 g Oral Daily Laura Rodriguez, DO      QUEtiapine  25 mg Oral HS Laura Rodriguez, DO      senna-docusate sodium  2 tablet Oral BID Laura Rodriguez, DO      topiramate  100 mg Oral BID Laura Rodriguez, DO      venlafaxine  25 mg Oral TID With Meals Laura Rodriguez,           Today, Patient Was Seen By: Ro Hamilton MD    **Please Note: This note may have been constructed using a voice recognition system.**

## 2024-01-26 NOTE — PLAN OF CARE
Problem: OCCUPATIONAL THERAPY ADULT  Goal: Performs self-care activities at highest level of function for planned discharge setting.  See evaluation for individualized goals.  Description: Treatment Interventions: ADL retraining, Functional transfer training, UE strengthening/ROM, Endurance training, Patient/family training, Equipment evaluation/education, Compensatory technique education, Continued evaluation, Energy conservation, Activityengagement          See flowsheet documentation for full assessment, interventions and recommendations.   Note: Limitation: Decreased ADL status, Decreased UE strength, Decreased Safe judgement during ADL, Decreased endurance, Decreased self-care trans, Decreased high-level ADLs  Prognosis: Good  Assessment: Patient participated in Skilled OT session this date with interventions consisting of self care tasks, functional transfers/mobility, ACLS assessment . Patient agreeable to OT treatment session, upon arrival patient was found seated OOB to Chair with spouse present.  In comparison to previous session, patient with improvements in functional transfers . Patient requiring verbal cues for safety, verbal cues for correct technique, and verbal cues for pacing thru activity steps. Patient continues to be functioning below baseline level, occupational performance remains limited secondary to factors listed above and increased risk for falls and injury.  The patient's raw score on the -PAC Daily Activity Inpatient Short Form is 18. A raw score of less than 19 suggests the patient may benefit from discharge to post-acute rehabilitation services. Please refer to the recommendation of the occupational Therapist for safe discharge planning.  From OT standpoint, recommendation at time of d/c would be minimum level I with focus on cognition/fit to test driving  Patient to benefit from continued Occupational Therapy treatment while in the hospital to address deficits as defined above and  maximize level of functional independence with ADLs and functional mobility.     Rehab Resource Intensity Level, OT: III (Minimum Resource Intensity)

## 2024-01-26 NOTE — PROGRESS NOTES
Progress Note - Infectious Disease   Carla Hunt 58 y.o. female MRN: [id number]  Unit/Bed#: [facility] 726-01 Encounter: [id number]      Impression/Recommendations:  Acute hypoxic respiratory failure, present on admission, secondary to mostly COVID but there was concern for concurrent bacterial pneumonia on admission due to elevated procalcitonin.  Therefore, patient completed treatment course for both COVID and bacterial pneumonia.  She was clinically improved with decreasing O2 requirement.  However, patient does require more O2 over the last 2 to 3 days, especially with ambulation.  Chest CT more suggestive of COVID and postviral bacterial pneumonia.  Procalcitonin obtained yesterday was 0.17.  Patient has no fever or leukocytosis.  She has minimal cough.  I suspect that her worsening hypoxia is still all secondary to inflammation from recent COVID.  We can continue antibiotic for the next 24 to 40 hours.  I will monitor procalcitonin trended.  If procalcitonins remain in normal range, will discontinue antibiotics, to avoid antibiotic toxicities.  Given prolonged hospitalization, if patient does indeed have bacterial pneumonia, she is at risk for resistant/nosocomial pathogens.  Will broaden antibiotic regimen.  Change antibiotic to IV cefepime.  Monitor serial procalcitonin's.  Recheck in AM tomorrow next day.  Monitor temperature/WBC.  Monitor respiratory symptoms  Monitor O2 saturation and requirement.    Severe COVID, present on admission.  Patient was treated with remdesivir, dexamethasone and was given 1 dose of Tocilizumab.  Given worsening hypoxic respiratory failure and still significant inflammation on chest CT, patient may benefit from another course of systemic corticosteroid.  Patient had 2 negative COVID antigen tests after treatment.  No additional antiviral needed.  Consider another course of systemic corticosteroid if no evidence of bacterial pneumonia and hypoxia does not improve.    CKD.   Creatinine at baseline.  Antibiotic at full dose.    Monitor creatinine.    Encephalopathy on admission.  This has resolved.  There was concern for possible seizure but no witnessed seizure and EEG did not capture it.  Monitor mental status.    Seizure disorder, status post temporal lobe resection in .    B-cell lymphoma, on chemotherapy, which is currently postponed.  Patient was leukopenic on admission but WBC now is near normal range.  Monitor WBC/ANC.    Discussed with patient and her  in detail regarding the above plan.  Discussed with Dr. Hamilton in detail regarding above antibiotic plan and monitoring procalcitonin plan.  She is in agreement.    Antibiotics:  Ceftriaxone/azithromycin    Subjective:  I was asked by Dr. Hamilton to reevaluate patient due to worsening oxygenation.  Chest CT was concerning for worsening infiltrates.  Ceftriaxone/azithromycin started for possible postviral pneumonia.    At present, patient feels well at rest, with minimal dyspnea.  However, she has significant dyspnea with minimal exertion.  Cough mostly nonproductive, occasionally productive of clear sputum.  No fever or chills.    Objective:  Vitals:  Temp:  [97.9 °F (36.6 °C)-99.6 °F (37.6 °C)] 97.9 °F (36.6 °C)  HR:  [] 91  Resp:  [17-20] 17  BP: ()/(51-71) 113/71  SpO2:  [81 %-93 %] 85 %  Temp (24hrs), Av.5 °F (36.9 °C), Min:97.9 °F (36.6 °C), Max:99.6 °F (37.6 °C)  Current: Temperature: 97.9 °F (36.6 °C)    Physical Exam:     General: Awake, alert, cooperative, no distress.   Neck:  Supple. No mass.  No lymphadenopathy.   Lungs: Expansion symmetric, no rales, no wheezing, respirations unlabored.   Heart:  Regular rate and rhythm, S1 and S2 normal, no murmur.   Abdomen: Soft, nondistended, non-tender, bowel sounds active all four quadrants, no masses, no organomegaly.   Extremities: No edema. No erythema/warmth. No ulcer. Nontender to palpation.   Skin:  No rash.   Neuro: Moves all  extremities.     Invasive Devices       Peripheral Intravenous Line  Duration             Peripheral IV 01/25/24 Distal;Right;Ventral (anterior) Forearm 1 day                    Labs studies:   I have personally reviewed pertinent labs.  Results from last 7 days   Lab Units 01/26/24  0454 01/25/24  1739 01/23/24  0504 01/22/24  0618 01/21/24  0505 01/20/24  0426   POTASSIUM mmol/L 4.2 3.8 4.7   < > 4.4 4.2   CHLORIDE mmol/L 111* 103 107   < > 109* 106   CO2 mmol/L 26 27 27   < > 28 29   BUN mg/dL 24 35* 34*   < > 28* 28*   CREATININE mg/dL 1.04 1.14 1.08   < > 1.00 0.91   EGFR ml/min/1.73sq m 59 53 57   < > 62 70   CALCIUM mg/dL 8.7 9.3 9.5   < > 9.5 9.4   AST U/L  --   --  18  --  25 35   ALT U/L  --   --  33  --  48 52   ALK PHOS U/L  --   --  87  --  89 99    < > = values in this interval not displayed.     Results from last 7 days   Lab Units 01/26/24  0454 01/25/24  1739 01/23/24  0504   WBC Thousand/uL 4.28* 5.65 7.94   HEMOGLOBIN g/dL 11.2* 12.4 11.9   PLATELETS Thousands/uL 161 189 228     Results from last 7 days   Lab Units 01/25/24  2120 01/25/24  1732   BLOOD CULTURE   --  Received in Microbiology Lab. Culture in Progress.  Received in Microbiology Lab. Culture in Progress.   SPUTUM CULTURE  Culture too young- will reincubate  --    GRAM STAIN RESULT  Rare Epithelial cells per low power field  No Polys or Bacteria seen  --        Imaging Studies:   I have personally reviewed pertinent imaging study reports and images in PACS.  1/25 chest CT reviewed personally.  Extensive bilateral groundglass opacities with consolidation in RLL.    EKG, Pathology, and Other Studies:   I have personally reviewed pertinent reports.

## 2024-01-26 NOTE — PLAN OF CARE
Problem: SAFETY ADULT  Goal: Patient will remain free of falls  Description: INTERVENTIONS:  - Educate patient/family on patient safety including physical limitations  - Instruct patient to call for assistance with activity   - Consult OT/PT to assist with strengthening/mobility   - Keep Call bell within reach  - Keep bed low and locked with side rails adjusted as appropriate  - Keep care items and personal belongings within reach  - Initiate and maintain comfort rounds  - Make Fall Risk Sign visible to staff  - Offer Toileting every 2 Hours, in advance of need  - Initiate/Maintain bed alarm  - Obtain necessary fall risk management equipment: non skid footwear  - Apply yellow socks and bracelet for high fall risk patients  - Consider moving patient to room near nurses station  Outcome: Progressing  Goal: Maintain or return to baseline ADL function  Description: INTERVENTIONS:  -  Assess patient's ability to carry out ADLs; assess patient's baseline for ADL function and identify physical deficits which impact ability to perform ADLs (bathing, care of mouth/teeth, toileting, grooming, dressing, etc.)  - Assess/evaluate cause of self-care deficits   - Assess range of motion  - Assess patient's mobility; develop plan if impaired  - Assess patient's need for assistive devices and provide as appropriate  - Encourage maximum independence but intervene and supervise when necessary  - Involve family in performance of ADLs  - Assess for home care needs following discharge   - Consider OT consult to assist with ADL evaluation and planning for discharge  - Provide patient education as appropriate  Outcome: Progressing  Goal: Maintains/Returns to pre admission functional level  Description: INTERVENTIONS:  - Perform AM-PAC 6 Click Basic Mobility/ Daily Activity assessment daily.  - Set and communicate daily mobility goal to care team and patient/family/caregiver.   - Collaborate with rehabilitation services on mobility goals  if consulted  - Perform Range of Motion 3 times a day.  - Reposition patient every 2 hours.  - Dangle patient 3 times a day  - Stand patient 3 times a day  - Ambulate patient 3 times a day  - Out of bed to chair 3 times a day   - Out of bed for meals 3 times a day  - Out of bed for toileting  - Record patient progress and toleration of activity level   Outcome: Progressing     Problem: RESPIRATORY - ADULT  Goal: Achieves optimal ventilation and oxygenation  Description: INTERVENTIONS:  - Assess for changes in respiratory status  - Assess for changes in mentation and behavior  - Position to facilitate oxygenation and minimize respiratory effort  - Oxygen administered by appropriate delivery if ordered  - Initiate smoking cessation education as indicated  - Encourage broncho-pulmonary hygiene including cough, deep breathe, Incentive Spirometry  - Assess the need for suctioning and aspirate as needed  - Assess and instruct to report SOB or any respiratory difficulty  - Respiratory Therapy support as indicated  Outcome: Progressing     Problem: MUSCULOSKELETAL - ADULT  Goal: Maintain or return mobility to safest level of function  Description: INTERVENTIONS:  - Assess patient's ability to carry out ADLs; assess patient's baseline for ADL function and identify physical deficits which impact ability to perform ADLs (bathing, care of mouth/teeth, toileting, grooming, dressing, etc.)  - Assess/evaluate cause of self-care deficits   - Assess range of motion  - Assess patient's mobility  - Assess patient's need for assistive devices and provide as appropriate  - Encourage maximum independence but intervene and supervise when necessary  - Involve family in performance of ADLs  - Assess for home care needs following discharge   - Consider OT consult to assist with ADL evaluation and planning for discharge  - Provide patient education as appropriate  Outcome: Progressing

## 2024-01-27 ENCOUNTER — APPOINTMENT (INPATIENT)
Dept: NON INVASIVE DIAGNOSTICS | Facility: HOSPITAL | Age: 59
DRG: 003 | End: 2024-01-27
Payer: COMMERCIAL

## 2024-01-27 PROBLEM — J12.9 VIRAL PNEUMONIA: Status: ACTIVE | Noted: 2024-01-25

## 2024-01-27 LAB
BASE EX.OXY STD BLDV CALC-SCNC: 77 % (ref 60–80)
BASE EXCESS BLDV CALC-SCNC: -1.3 MMOL/L
GLUCOSE SERPL-MCNC: 109 MG/DL (ref 65–140)
GLUCOSE SERPL-MCNC: 110 MG/DL (ref 65–140)
GLUCOSE SERPL-MCNC: 138 MG/DL (ref 65–140)
GLUCOSE SERPL-MCNC: 161 MG/DL (ref 65–140)
GLUCOSE SERPL-MCNC: 170 MG/DL (ref 65–140)
HCO3 BLDV-SCNC: 24.9 MMOL/L (ref 24–30)
LDH SERPL-CCNC: 326 U/L (ref 140–271)
O2 CT BLDV-SCNC: 12.9 ML/DL
PCO2 BLDV: 48 MM HG (ref 42–50)
PH BLDV: 7.33 [PH] (ref 7.3–7.4)
PO2 BLDV: 46.4 MM HG (ref 35–45)
PROCALCITONIN SERPL-MCNC: 0.17 NG/ML

## 2024-01-27 PROCEDURE — 94664 DEMO&/EVAL PT USE INHALER: CPT

## 2024-01-27 PROCEDURE — 94760 N-INVAS EAR/PLS OXIMETRY 1: CPT

## 2024-01-27 PROCEDURE — 99232 SBSQ HOSP IP/OBS MODERATE 35: CPT | Performed by: STUDENT IN AN ORGANIZED HEALTH CARE EDUCATION/TRAINING PROGRAM

## 2024-01-27 PROCEDURE — 82948 REAGENT STRIP/BLOOD GLUCOSE: CPT

## 2024-01-27 PROCEDURE — 83615 LACTATE (LD) (LDH) ENZYME: CPT | Performed by: STUDENT IN AN ORGANIZED HEALTH CARE EDUCATION/TRAINING PROGRAM

## 2024-01-27 PROCEDURE — 82805 BLOOD GASES W/O2 SATURATION: CPT | Performed by: STUDENT IN AN ORGANIZED HEALTH CARE EDUCATION/TRAINING PROGRAM

## 2024-01-27 PROCEDURE — 99232 SBSQ HOSP IP/OBS MODERATE 35: CPT | Performed by: INTERNAL MEDICINE

## 2024-01-27 PROCEDURE — 84145 PROCALCITONIN (PCT): CPT | Performed by: INTERNAL MEDICINE

## 2024-01-27 RX ORDER — PREDNISONE 20 MG/1
60 TABLET ORAL DAILY
Status: DISCONTINUED | OUTPATIENT
Start: 2024-01-27 | End: 2024-01-30

## 2024-01-27 RX ORDER — FUROSEMIDE 10 MG/ML
40 INJECTION INTRAMUSCULAR; INTRAVENOUS ONCE
Status: COMPLETED | OUTPATIENT
Start: 2024-01-27 | End: 2024-01-27

## 2024-01-27 RX ADMIN — CEFEPIME 2000 MG: 2 INJECTION, POWDER, FOR SOLUTION INTRAVENOUS at 04:59

## 2024-01-27 RX ADMIN — ENOXAPARIN SODIUM 40 MG: 40 INJECTION SUBCUTANEOUS at 08:47

## 2024-01-27 RX ADMIN — QUETIAPINE 25 MG: 25 TABLET ORAL at 21:57

## 2024-01-27 RX ADMIN — MELATONIN 6 MG: at 21:59

## 2024-01-27 RX ADMIN — LAMOTRIGINE 150 MG: 100 TABLET ORAL at 08:47

## 2024-01-27 RX ADMIN — FUROSEMIDE 40 MG: 10 INJECTION, SOLUTION INTRAMUSCULAR; INTRAVENOUS at 16:07

## 2024-01-27 RX ADMIN — INSULIN LISPRO 2 UNITS: 100 INJECTION, SOLUTION INTRAVENOUS; SUBCUTANEOUS at 16:19

## 2024-01-27 RX ADMIN — TOPIRAMATE 100 MG: 100 TABLET, FILM COATED ORAL at 08:47

## 2024-01-27 RX ADMIN — TOPIRAMATE 100 MG: 100 TABLET, FILM COATED ORAL at 17:29

## 2024-01-27 RX ADMIN — VENLAFAXINE 25 MG: 25 TABLET ORAL at 08:54

## 2024-01-27 RX ADMIN — VENLAFAXINE 25 MG: 25 TABLET ORAL at 16:21

## 2024-01-27 RX ADMIN — VENLAFAXINE 25 MG: 25 TABLET ORAL at 11:26

## 2024-01-27 RX ADMIN — PREDNISONE 60 MG: 20 TABLET ORAL at 14:05

## 2024-01-27 RX ADMIN — LAMOTRIGINE 150 MG: 100 TABLET ORAL at 21:57

## 2024-01-27 RX ADMIN — INSULIN LISPRO 2 UNITS: 100 INJECTION, SOLUTION INTRAVENOUS; SUBCUTANEOUS at 21:59

## 2024-01-27 RX ADMIN — CHLORHEXIDINE GLUCONATE 15 ML: 1.2 SOLUTION ORAL at 08:47

## 2024-01-27 NOTE — ASSESSMENT & PLAN NOTE
Admitted with COVID multifocal pneumonia.  Completed 7 days of IV ceftriaxone  Patient with continued hypoxia requiring 4-8 L of oxygen  She currently reports feeling well and denies cough and shortness of breath  CTA chest with no PE, but showed extensive bilateral groundglass opacity with multifocal consolidation in the right lung, greatest in the right lower lobe, compatible with pneumonia.   Procalcitonin 0.17 x 2, discussed with ID and low suspicion for bacterial pneumonia.  Discontinue antibiotics  Pulmonology following, input noted

## 2024-01-27 NOTE — ASSESSMENT & PLAN NOTE
Secondary to severe COVID and multifocal pna  Completed 7 days of IV Ceftriaxone on admission for superimposed bacterial pneumonia  Continue to wean O2 as able  Incentive spirometer  CT chest with pneumonia and airspace opacities COVID versus pulmonary fibrosis  Sputum culture with normal oral lauren  TTE pending  Reviewed pulmonology recommendations-patient may need bronchoscopy to rule out typical infections.  LDH to screen for PJP ordered by pulm  Start steroid taper  Abx discontinued after discussing with ID as low suspicion of infection with unchanged procalcitonin and hemodynamic stability

## 2024-01-27 NOTE — PLAN OF CARE
Problem: Prexisting or High Potential for Compromised Skin Integrity  Goal: Skin integrity is maintained or improved  Description: INTERVENTIONS:  - Identify patients at risk for skin breakdown  - Assess and monitor skin integrity  - Assess and monitor nutrition and hydration status  - Monitor labs   - Assess for incontinence   - Turn and reposition patient  - Assist with mobility/ambulation  - Relieve pressure over bony prominences  - Avoid friction and shearing  - Provide appropriate hygiene as needed including keeping skin clean and dry  - Evaluate need for skin moisturizer/barrier cream  - Collaborate with interdisciplinary team   - Patient/family teaching  - Consider wound care consult   Outcome: Progressing     Problem: Nutrition/Hydration-ADULT  Goal: Nutrient/Hydration intake appropriate for improving, restoring or maintaining nutritional needs  Description: Monitor and assess patient's nutrition/hydration status for malnutrition. Collaborate with interdisciplinary team and initiate plan and interventions as ordered.  Monitor patient's weight and dietary intake as ordered or per policy. Utilize nutrition screening tool and intervene as necessary. Determine patient's food preferences and provide high-protein, high-caloric foods as appropriate.     INTERVENTIONS:  - Monitor oral intake, urinary output, labs, and treatment plans  - Assess nutrition and hydration status and recommend course of action  - Evaluate amount of meals eaten  - Assist patient with eating if necessary   - Allow adequate time for meals  - Recommend/ encourage appropriate diets, oral nutritional supplements, and vitamin/mineral supplements  - Order, calculate, and assess calorie counts as needed  - Recommend, monitor, and adjust tube feedings and TPN/PPN based on assessed needs  - Assess need for intravenous fluids  - Provide specific nutrition/hydration education as appropriate  - Include patient/family/caregiver in decisions related to  nutrition  Outcome: Progressing     Problem: PAIN - ADULT  Goal: Verbalizes/displays adequate comfort level or baseline comfort level  Description: Interventions:  - Encourage patient to monitor pain and request assistance  - Assess pain using appropriate pain scale  - Administer analgesics based on type and severity of pain and evaluate response  - Implement non-pharmacological measures as appropriate and evaluate response  - Consider cultural and social influences on pain and pain management  - Notify physician/advanced practitioner if interventions unsuccessful or patient reports new pain  Outcome: Progressing     Problem: INFECTION - ADULT  Goal: Absence or prevention of progression during hospitalization  Description: INTERVENTIONS:  - Assess and monitor for signs and symptoms of infection  - Monitor lab/diagnostic results  - Monitor all insertion sites, i.e. indwelling lines, tubes, and drains  - Monitor endotracheal if appropriate and nasal secretions for changes in amount and color  - Marion appropriate cooling/warming therapies per order  - Administer medications as ordered  - Instruct and encourage patient and family to use good hand hygiene technique  - Identify and instruct in appropriate isolation precautions for identified infection/condition  Outcome: Progressing     Problem: SAFETY ADULT  Goal: Patient will remain free of falls  Description: INTERVENTIONS:  - Educate patient/family on patient safety including physical limitations  - Instruct patient to call for assistance with activity   - Consult OT/PT to assist with strengthening/mobility   - Keep Call bell within reach  - Keep bed low and locked with side rails adjusted as appropriate  - Keep care items and personal belongings within reach  - Initiate and maintain comfort rounds  - Make Fall Risk Sign visible to staff  - Offer Toileting every 2 Hours, in advance of need  - Initiate/Maintain bed alarm  - Obtain necessary fall risk management  equipment: non skid footwear  - Apply yellow socks and bracelet for high fall risk patients  - Consider moving patient to room near nurses station  Outcome: Progressing  Goal: Maintain or return to baseline ADL function  Description: INTERVENTIONS:  -  Assess patient's ability to carry out ADLs; assess patient's baseline for ADL function and identify physical deficits which impact ability to perform ADLs (bathing, care of mouth/teeth, toileting, grooming, dressing, etc.)  - Assess/evaluate cause of self-care deficits   - Assess range of motion  - Assess patient's mobility; develop plan if impaired  - Assess patient's need for assistive devices and provide as appropriate  - Encourage maximum independence but intervene and supervise when necessary  - Involve family in performance of ADLs  - Assess for home care needs following discharge   - Consider OT consult to assist with ADL evaluation and planning for discharge  - Provide patient education as appropriate  Outcome: Progressing  Goal: Maintains/Returns to pre admission functional level  Description: INTERVENTIONS:  - Perform AM-PAC 6 Click Basic Mobility/ Daily Activity assessment daily.  - Set and communicate daily mobility goal to care team and patient/family/caregiver.   - Collaborate with rehabilitation services on mobility goals if consulted  - Perform Range of Motion 3 times a day.  - Reposition patient every 2 hours.  - Dangle patient 3 times a day  - Stand patient 3 times a day  - Ambulate patient 3 times a day  - Out of bed to chair 3 times a day   - Out of bed for meals 3 times a day  - Out of bed for toileting  - Record patient progress and toleration of activity level   Outcome: Progressing     Problem: DISCHARGE PLANNING  Goal: Discharge to home or other facility with appropriate resources  Description: INTERVENTIONS:  - Identify barriers to discharge w/patient and caregiver  - Arrange for needed discharge resources and transportation as appropriate  -  Identify discharge learning needs (meds, wound care, etc.)  - Arrange for interpretive services to assist at discharge as needed  - Refer to Case Management Department for coordinating discharge planning if the patient needs post-hospital services based on physician/advanced practitioner order or complex needs related to functional status, cognitive ability, or social support system  Outcome: Progressing     Problem: NEUROSENSORY - ADULT  Goal: Achieves stable or improved neurological status  Description: INTERVENTIONS  - Monitor and report changes in neurological status  - Monitor vital signs such as temperature, blood pressure, glucose, and any other labs ordered   - Initiate measures to prevent increased intracranial pressure  - Monitor for seizure activity and implement precautions if appropriate      Outcome: Progressing  Goal: Remains free of injury related to seizures activity  Description: INTERVENTIONS  - Maintain airway, patient safety  and administer oxygen as ordered  - Monitor patient for seizure activity, document and report duration and description of seizure to physician/advanced practitioner  - If seizure occurs,  ensure patient safety during seizure  - Reorient patient post seizure  - Seizure pads on all 4 side rails  - Instruct patient/family to notify RN of any seizure activity including if an aura is experienced  - Instruct patient/family to call for assistance with activity based on nursing assessment  - Administer anti-seizure medications if ordered    Outcome: Progressing  Goal: Achieves maximal functionality and self care  Description: INTERVENTIONS  - Monitor swallowing and airway patency with patient fatigue and changes in neurological status  - Encourage and assist patient to increase activity and self care.   - Encourage visually impaired, hearing impaired and aphasic patients to use assistive/communication devices  Outcome: Progressing     Problem: CARDIOVASCULAR - ADULT  Goal:  Maintains optimal cardiac output and hemodynamic stability  Description: INTERVENTIONS:  - Monitor I/O, vital signs and rhythm  - Monitor for S/S and trends of decreased cardiac output  - Administer and titrate ordered vasoactive medications to optimize hemodynamic stability  - Assess quality of pulses, skin color and temperature  - Assess for signs of decreased coronary artery perfusion  - Instruct patient to report change in severity of symptoms  Outcome: Progressing  Goal: Absence of cardiac dysrhythmias or at baseline rhythm  Description: INTERVENTIONS:  - Continuous cardiac monitoring, vital signs, obtain 12 lead EKG if ordered  - Administer antiarrhythmic and heart rate control medications as ordered  - Monitor electrolytes and administer replacement therapy as ordered  Outcome: Progressing     Problem: RESPIRATORY - ADULT  Goal: Achieves optimal ventilation and oxygenation  Description: INTERVENTIONS:  - Assess for changes in respiratory status  - Assess for changes in mentation and behavior  - Position to facilitate oxygenation and minimize respiratory effort  - Oxygen administered by appropriate delivery if ordered  - Initiate smoking cessation education as indicated  - Encourage broncho-pulmonary hygiene including cough, deep breathe, Incentive Spirometry  - Assess the need for suctioning and aspirate as needed  - Assess and instruct to report SOB or any respiratory difficulty  - Respiratory Therapy support as indicated  Outcome: Progressing     Problem: GASTROINTESTINAL - ADULT  Goal: Maintains or returns to baseline bowel function  Description: INTERVENTIONS:  - Assess bowel function  - Encourage oral fluids to ensure adequate hydration  - Administer IV fluids if ordered to ensure adequate hydration  - Administer ordered medications as needed  - Encourage mobilization and activity  - Consider nutritional services referral to assist patient with adequate nutrition and appropriate food choices  Outcome:  Progressing  Goal: Maintains adequate nutritional intake  Description: INTERVENTIONS:  - Monitor percentage of each meal consumed  - Identify factors contributing to decreased intake, treat as appropriate  - Assist with meals as needed  - Monitor I&O, weight, and lab values if indicated  - Obtain nutrition services referral as needed  Outcome: Progressing     Problem: GENITOURINARY - ADULT  Goal: Maintains or returns to baseline urinary function  Description: INTERVENTIONS:  - Assess urinary function  - Encourage oral fluids to ensure adequate hydration if ordered  - Administer IV fluids as ordered to ensure adequate hydration  - Administer ordered medications as needed  - Offer frequent toileting  - Follow urinary retention protocol if ordered  Outcome: Progressing  Goal: Absence of urinary retention  Description: INTERVENTIONS:  - Assess patient's ability to void and empty bladder  - Monitor I/O  - Bladder scan as needed  - Discuss with physician/AP medications to alleviate retention as needed  - Discuss catheterization for long term situations as appropriate  Outcome: Progressing     Problem: METABOLIC, FLUID AND ELECTROLYTES - ADULT  Goal: Electrolytes maintained within normal limits  Description: INTERVENTIONS:  - Monitor labs and assess patient for signs and symptoms of electrolyte imbalances  - Administer electrolyte replacement as ordered  - Monitor response to electrolyte replacements, including repeat lab results as appropriate  - Instruct patient on fluid and nutrition as appropriate  Outcome: Progressing  Goal: Fluid balance maintained  Description: INTERVENTIONS:  - Monitor labs   - Monitor I/O and WT  - Instruct patient on fluid and nutrition as appropriate  - Assess for signs & symptoms of volume excess or deficit  Outcome: Progressing     Problem: SKIN/TISSUE INTEGRITY - ADULT  Goal: Skin Integrity remains intact(Skin Breakdown Prevention)  Description: Assess:  -Perform Flavio assessment every shift    -Clean and moisturize skin every day   -Inspect skin when repositioning, toileting, and assisting with ADLS  -Assess under medical devices such as ronny  every hour   -Assess extremities for adequate circulation and sensation     Bed Management:  -Have minimal linens on bed & keep smooth, unwrinkled  -Change linens as needed when moist or perspiring  -Avoid sitting or lying in one position for more than 2 hours while in bed  -Keep HOB at 45 degrees     Toileting:  -Offer bedside commode  -Assess for incontinence every hour  -Use incontinent care products after each incontinent episode such as moisture barrier cream     Activity:  -Mobilize patient 3 times a day  -Encourage activity and walks on unit  -Encourage or provide ROM exercises   -Turn and reposition patient every 2 Hours  -Use appropriate equipment to lift or move patient in bed  -Instruct/ Assist with weight shifting every hour when out of bed in chair  -Consider limitation of chair time 2 hour intervals    Skin Care:  -Avoid use of baby powder, tape, friction and shearing, hot water or constrictive clothing  -Relieve pressure over bony prominences using allevyn   -Do not massage red bony areas    Next Steps:  -Teach patient strategies to minimize risks such as weight shifting    -Consider consults to  interdisciplinary teams such as PT  Outcome: Progressing  Goal: Incision(s), wounds(s) or drain site(s) healing without S/S of infection  Description: INTERVENTIONS  - Assess and document dressing, incision, wound bed, drain sites and surrounding tissue  - Provide patient and family education  - Perform skin care/dressing changes every day  Outcome: Progressing     Problem: HEMATOLOGIC - ADULT  Goal: Maintains hematologic stability  Description: INTERVENTIONS  - Assess for signs and symptoms of bleeding or hemorrhage  - Monitor labs  - Administer supportive blood products/factors as ordered and appropriate  Outcome: Progressing     Problem:  MUSCULOSKELETAL - ADULT  Goal: Maintain or return mobility to safest level of function  Description: INTERVENTIONS:  - Assess patient's ability to carry out ADLs; assess patient's baseline for ADL function and identify physical deficits which impact ability to perform ADLs (bathing, care of mouth/teeth, toileting, grooming, dressing, etc.)  - Assess/evaluate cause of self-care deficits   - Assess range of motion  - Assess patient's mobility  - Assess patient's need for assistive devices and provide as appropriate  - Encourage maximum independence but intervene and supervise when necessary  - Involve family in performance of ADLs  - Assess for home care needs following discharge   - Consider OT consult to assist with ADL evaluation and planning for discharge  - Provide patient education as appropriate  Outcome: Progressing     Problem: COPING  Goal: Pt/Family able to verbalize concerns and demonstrate effective coping strategies  Description: INTERVENTIONS:  - Assist patient/family to identify coping skills, available support systems and cultural and spiritual values  - Provide emotional support, including active listening and acknowledgement of concerns of patient and caregivers  - Reduce environmental stimuli, as able  - Provide patient education  - Assess for spiritual pain/suffering and initiate spiritual care, including notification of Pastoral Care or [place] based community as needed  - Assess effectiveness of coping strategies  Outcome: Progressing  Goal: Will report anxiety at manageable levels  Description: INTERVENTIONS:  - Administer medication as ordered  - Teach and encourage coping skills  - Provide emotional support  - Assess patient/family for anxiety and ability to cope  Outcome: Progressing     Problem: Potential for Falls  Goal: Patient will remain free of falls  Description: INTERVENTIONS:  - Educate patient/family on patient safety including physical limitations  - Instruct patient to call for  assistance with activity   - Consult OT/PT to assist with strengthening/mobility   - Keep Call bell within reach  - Keep bed low and locked with side rails adjusted as appropriate  - Keep care items and personal belongings within reach  - Initiate and maintain comfort rounds  - Make Fall Risk Sign visible to staff  - Offer Toileting every 2 Hours, in advance of need  - Initiate/Maintain bed alarm  - Obtain necessary fall risk management equipment: non skid footwear  - Apply yellow socks and bracelet for high fall risk patients  - Consider moving patient to room near nurses station  Outcome: Progressing

## 2024-01-27 NOTE — UTILIZATION REVIEW
Continued Stay Review    Date: 1/27/24                          Current Patient Class: INPT   Current Level of Care: MS    HPI:58 y.o. female initially admitted on   1/10/24  w/aute hypoxic resp failure 2/2  pna.        1/27   Assessment/Plan:  per Pulmonary:  currently requiring 15 L nasal cannula, she is unsafe to undergo bronchoscopy as she will likely remain on the ventilator postprocedure given her hypoxia.     Would also need to consider immunocompromise host pathogens such as PCP, can check LDH   will place her on 60 mg daily of prednisone.  Continue with diuresis as he has had significant urine output      Vital Signs:   01/27/24 1310 -- -- -- -- -- 89 % Abnormal  80 -- 15 L/min Mid flow nasal cannula -- --   01/27/24 11:26:52 -- 96 -- 97/63 74 95 % -- -- -- -- -- --   01/27/24 08:42:23 -- 109 Abnormal  -- 84/53 Abnormal  63 Abnormal  96 % -- -- -- -- -- --   01/27/24 0800 -- -- -- -- -- -- 80 -- 15 L/min -- -- --   01/27/24 07:36:10 98.8 °F (37.1 °C) 103 -- 85/53 Abnormal  64 Abnormal  89 % Abnormal  -- -- -- -- -- --   01/27/24 0715 -- -- -- -- -- -- 80 -- 15 L/min  Mid flow nasal cannula  --      Pertinent Labs/Diagnostic Results:       Results from last 7 days   Lab Units 01/26/24 0454 01/25/24 1739 01/23/24 0504 01/22/24 0618 01/21/24  0505   WBC Thousand/uL 4.28* 5.65 7.94 10.38* 7.83   HEMOGLOBIN g/dL 11.2* 12.4 11.9 12.6 12.2   HEMATOCRIT % 34.7* 39.0 38.1 39.6 37.9   PLATELETS Thousands/uL 161 189 228 244 230   BANDS PCT %  --  13*  --  5  --          Results from last 7 days   Lab Units 01/26/24 0454 01/25/24 1739 01/23/24  0504 01/22/24 0618 01/21/24  0505   SODIUM mmol/L 140 136 140 140 143   POTASSIUM mmol/L 4.2 3.8 4.7 4.7 4.4   CHLORIDE mmol/L 111* 103 107 107 109*   CO2 mmol/L 26 27 27 27 28   ANION GAP mmol/L 3 6 6 6 6   BUN mg/dL 24 35* 34* 34* 28*   CREATININE mg/dL 1.04 1.14 1.08 1.05 1.00   EGFR ml/min/1.73sq m 59 53 57 59 62   CALCIUM mg/dL 8.7 9.3 9.5 9.7 9.5       Results from  last 7 days   Lab Units 01/27/24  1111 01/27/24  0659 01/27/24  0008 01/26/24  2052 01/26/24  1647 01/26/24  1113 01/26/24  0558 01/25/24 2122 01/25/24  1617 01/25/24  1050 01/25/24  0707 01/24/24 2124   POC GLUCOSE mg/dl 138 110 109 134 90 154* 94 97 130 126 89 95     Results from last 7 days   Lab Units 01/26/24  0454 01/25/24  1739 01/23/24  0504 01/22/24  0618 01/21/24  0505   GLUCOSE RANDOM mg/dL 83 112 101 139 103       Results from last 7 days   Lab Units 01/27/24  1228   PH NICA  7.333   PCO2 NICA mm Hg 48.0   PO2 NICA mm Hg 46.4*   HCO3 NICA mmol/L 24.9   BASE EXC NICA mmol/L -1.3   O2 CONTENT NICA ml/dL 12.9   O2 HGB, VENOUS % 77.0       Results from last 7 days   Lab Units 01/27/24  0510 01/25/24  1739   PROCALCITONIN ng/ml 0.17 0.17       Results from last 7 days   Lab Units 01/26/24  0454   BNP pg/mL 12       Results from last 7 days   Lab Units 01/25/24 2120 01/25/24  1732   BLOOD CULTURE   --  No Growth at 24 hrs.  No Growth at 24 hrs.   SPUTUM CULTURE  Few Colonies of - Presumptive Stenotrophomonas maltophilia*  2+ Growth of  --    GRAM STAIN RESULT  Rare Epithelial cells per low power field  No Polys or Bacteria seen  --                    Medications:   Scheduled Medications:  chlorhexidine, 15 mL, Mouth/Throat, Q12H SARTHAK  enoxaparin, 40 mg, Subcutaneous, Daily  furosemide, 40 mg, Intravenous, Once  insulin lispro, 2-12 Units, Subcutaneous, TID With Meals  insulin lispro, 2-12 Units, Subcutaneous, HS  lamoTRIgine, 150 mg, Oral, BID  melatonin, 6 mg, Oral, HS  polyethylene glycol, 17 g, Oral, Daily  predniSONE, 60 mg, Oral, Daily  QUEtiapine, 25 mg, Oral, HS  senna-docusate sodium, 2 tablet, Oral, BID  topiramate, 100 mg, Oral, BID  venlafaxine, 25 mg, Oral, TID With Meals      Continuous IV Infusions:     PRN Meds:  acetaminophen, 650 mg, Oral, Q6H PRN  bisacodyl, 10 mg, Rectal, Daily PRN  metoprolol, 2.5 mg, Intravenous, Q6H PRN  ondansetron, 4 mg, Intravenous, Q6H PRN        Discharge Plan:  tbd    Network Utilization Review Department  ATTENTION: Please call with any questions or concerns to [phone] and carefully listen to the prompts so that you are directed to the right person. All voicemails are confidential.   For Discharge needs, contact Care Management DC Support Team at [phone] opt. [phone]  Send all requests for admission clinical reviews, approved or denied determinations and any other requests to dedicated fax number below belonging to the campus where the patient is receiving treatment. List of dedicated fax numbers for the Facilities:  FACILITY NAME UR FAX NUMBER   ADMISSION DENIALS (Administrative/Medical Necessity) [phone]   DISCHARGE SUPPORT TEAM (NETWORK) [phone]   PARENT CHILD HEALTH (Maternity/NICU/Pediatrics) [phone]   [facility] [phone]   [facility] [phone]   [facility] [phone]   [facility] [phone]   [facility] [phone]   [facility] [phone]   [facility] [phone]   [facility] [phone]   [facility] [phone]   [facility] [phone]   [facility] [phone]   [facility] [phone]

## 2024-01-27 NOTE — PROGRESS NOTES
[facility]  Progress Note  Name: Carla Hunt I  MRN: [id number]  Unit/Bed#: [facility] 726-01 I Date of Admission: 1/10/2024   Date of Service: 1/27/2024 I Hospital Day: 17    Assessment/Plan   Viral pneumonia  Assessment & Plan  Admitted with COVID multifocal pneumonia.  Completed 7 days of IV ceftriaxone  Patient with continued hypoxia requiring 4-8 L of oxygen  She currently reports feeling well and denies cough and shortness of breath  CTA chest with no PE, but showed extensive bilateral groundglass opacity with multifocal consolidation in the right lung, greatest in the right lower lobe, compatible with pneumonia.   Procalcitonin 0.17 x 2, discussed with ID and low suspicion for bacterial pneumonia.  Discontinue antibiotics  Pulmonology following, input noted    Teto marginal zone B-cell lymphoma (HCC)  Assessment & Plan  Outpatient f/u    COVID  Assessment & Plan  Started on severe pathway  S/p Actermra 1/9  S/p dexamethasone and remdesivir  Due to patient's immunocompromise state patient will need to have 2 COVID antigen negative results prior to dc Isolation.  Negative results, okay to DC isolation  Wean O2 as tolerated.  Plan as above      Anxiety state  Assessment & Plan  Continue home effexor     * Acute respiratory failure with hypoxia (HCC)  Assessment & Plan  Secondary to severe COVID and multifocal pna  Completed 7 days of IV Ceftriaxone on admission for superimposed bacterial pneumonia  Continue to wean O2 as able  Incentive spirometer  CT chest with pneumonia and airspace opacities COVID versus pulmonary fibrosis  Sputum culture with normal oral lauren  TTE pending  Reviewed pulmonology recommendations-patient may need bronchoscopy to rule out typical infections.  LDH to screen for PJP ordered by pulm  Start steroid taper  Abx discontinued after discussing with ID as low suspicion of infection with unchanged procalcitonin and hemodynamic stability                VTE Pharmacologic Prophylaxis: VTE Score: 11 High Risk (Score >/= 5) - Pharmacological DVT Prophylaxis Ordered: enoxaparin (Lovenox). Sequential Compression Devices Ordered.    Mobility:   Basic Mobility Inpatient Raw Score: 20  JH-HLM Goal: 6: Walk 10 steps or more  JH-HLM Achieved: 6: Walk 10 steps or more  HLM Goal achieved. Continue to encourage appropriate mobility.    Patient Centered Rounds: I performed bedside rounds with nursing staff today.   Discussions with Specialists or Other Care Team Provider: , ID    Education and Discussions with Family / Patient:  Patient will update family members.     Total Time Spent on Date of Encounter in care of patient: 35 mins. This time was spent on one or more of the following: performing physical exam; counseling and coordination of care; obtaining or reviewing history; documenting in the medical record; reviewing/ordering tests, medications or procedures; communicating with other healthcare professionals and discussing with patient's family/caregivers.    Current Length of Stay: 17 day(s)  Current Patient Status: Inpatient   Certification Statement: The patient will continue to require additional inpatient hospital stay due to steroids, pulmonology final recommendations, steroid taper, monitoring of oxygen saturations  Discharge Plan: Anticipate discharge in >72 hrs to home with home services.    Code Status: Level 1 - Full Code    Subjective:   No acute events overnight.  Patient reports feeling very tired this morning.  Reports intermittent shortness of breath with exertion.  No cough or sputum production.  No fever or chills.  No chest pain.    Objective:     Vitals:   Temp (24hrs), Av.4 °F (37.4 °C), Min:98.8 °F (37.1 °C), Max:99.9 °F (37.7 °C)    Temp:  [98.8 °F (37.1 °C)-99.9 °F (37.7 °C)] 98.8 °F (37.1 °C)  HR:  [] 96  Resp:  [18] 18  BP: ()/(53-68) 97/63  SpO2:  [81 %-96 %] 89 %  Body mass index is 25.24 kg/m².     Input and  Output Summary (last 24 hours):     Intake/Output Summary (Last 24 hours) at 1/27/2024 1416  Last data filed at 1/27/2024 0901  Gross per 24 hour   Intake 240 ml   Output --   Net 240 ml       Physical Exam:   Physical Exam  Vitals and nursing note reviewed.   Constitutional:       General: She is not in acute distress.     Appearance: She is ill-appearing.   HENT:      Head: Normocephalic and atraumatic.   Cardiovascular:      Rate and Rhythm: Normal rate and regular rhythm.   Pulmonary:      Effort: Pulmonary effort is normal. No respiratory distress.      Breath sounds: No wheezing or rales.   Musculoskeletal:         General: No swelling.      Cervical back: Neck supple.   Skin:     General: Skin is warm and dry.   Neurological:      Mental Status: She is alert.   Psychiatric:         Mood and Affect: Mood normal.         Behavior: Behavior normal.            Additional Data:     Labs:  Results from last 7 days   Lab Units 01/26/24  0454 01/25/24  1739   WBC Thousand/uL 4.28* 5.65   HEMOGLOBIN g/dL 11.2* 12.4   HEMATOCRIT % 34.7* 39.0   PLATELETS Thousands/uL 161 189   BANDS PCT %  --  13*   LYMPHO PCT %  --  6*   MONO PCT %  --  2*   EOS PCT %  --  0     Results from last 7 days   Lab Units 01/26/24  0454 01/25/24  1739 01/23/24  0504   SODIUM mmol/L 140   < > 140   POTASSIUM mmol/L 4.2   < > 4.7   CHLORIDE mmol/L 111*   < > 107   CO2 mmol/L 26   < > 27   BUN mg/dL 24   < > 34*   CREATININE mg/dL 1.04   < > 1.08   ANION GAP mmol/L 3   < > 6   CALCIUM mg/dL 8.7   < > 9.5   ALBUMIN g/dL  --   --  3.4*   TOTAL BILIRUBIN mg/dL  --   --  0.58   ALK PHOS U/L  --   --  87   ALT U/L  --   --  33   AST U/L  --   --  18   GLUCOSE RANDOM mg/dL 83   < > 101    < > = values in this interval not displayed.         Results from last 7 days   Lab Units 01/27/24  1111 01/27/24  0659 01/27/24  0008 01/26/24  2052 01/26/24  1647 01/26/24  1113 01/26/24  0558 01/25/24  2122 01/25/24  1617 01/25/24  1050 01/25/24  0707  01/24/24 2124   POC GLUCOSE mg/dl 138 110 109 134 90 154* 94 97 130 126 89 95         Results from last 7 days   Lab Units 01/27/24  0510 01/25/24  1739   PROCALCITONIN ng/ml 0.17 0.17       Lines/Drains:  Invasive Devices       Peripheral Intravenous Line  Duration             Peripheral IV 01/25/24 Distal;Right;Ventral (anterior) Forearm 2 days                          Imaging: No pertinent imaging reviewed.    Recent Cultures (last 7 days):   Results from last 7 days   Lab Units 01/25/24 2120 01/25/24  1732   BLOOD CULTURE   --  No Growth at 24 hrs.  No Growth at 24 hrs.   SPUTUM CULTURE  Few Colonies of - Presumptive Stenotrophomonas maltophilia*  2+ Growth of  --    GRAM STAIN RESULT  Rare Epithelial cells per low power field  No Polys or Bacteria seen  --        Last 24 Hours Medication List:   Current Facility-Administered Medications   Medication Dose Route Frequency Provider Last Rate    acetaminophen  650 mg Oral Q6H PRN Laura Rodriguez, DO      bisacodyl  10 mg Rectal Daily PRN Laura Rodriguez, DO      chlorhexidine  15 mL Mouth/Throat Q12H SARTHAK Laura Rodriguez, DO      enoxaparin  40 mg Subcutaneous Daily Laura Rodriguez, DO      furosemide  40 mg Intravenous Once Scot Monsalve, DO      insulin lispro  2-12 Units Subcutaneous TID With Meals Laura Rodriguez, DO      insulin lispro  2-12 Units Subcutaneous HS Laura Rodriguez, DO      lamoTRIgine  150 mg Oral BID Laura Rodriguez, DO      melatonin  6 mg Oral HS Laura Rodriguez, DO      metoprolol  2.5 mg Intravenous Q6H PRN Ro Hamilton MD      ondansetron  4 mg Intravenous Q6H PRN Laura Rodriguez, DO      polyethylene glycol  17 g Oral Daily Laura Rodriguez, DO      predniSONE  60 mg Oral Daily Scot Monsalve, DO      QUEtiapine  25 mg Oral HS Laura Rodriguez, DO      senna-docusate sodium  2 tablet Oral BID Laura Rodriguez, DO      topiramate  100 mg Oral BID Laura Rodriguez, DO      venlafaxine  25 mg Oral TID With  Meals Laura Rodriguez,           Today, Patient Was Seen By: Ro Hamilton MD    **Please Note: This note may have been constructed using a voice recognition system.**

## 2024-01-27 NOTE — PROGRESS NOTES
PULMONOLOGY PROGRESS NOTE     Name: Carla Hunt   Age & Sex: 58 y.o. female   MRN: [id number]  Unit/Bed#: [facility] 726-01   Encounter: [id number]    PATIENT INFORMATION     Name: Carla Hunt   Age & Sex: 58 y.o. female   MRN: [id number]  Hospital Stay Days: 17    ASSESSMENT/PLAN     Assessment:   Acute respiratory insufficiency with hypoxemia  Abnormal CT chest  Volume overload.  Post COVID19  pulmonary fibrosis  CKD3  Teto marginal zone B cell lymphoma stage Iv on benadmustine/rituxan x 6 cycles completed 1/3/22, on maintenance rituxan hycela last received in 12/2023  Encephalopathy    Plan:  Removed supplemental O2. Replaced and O2 escalated. Titrate down supplemental O2. Goal SpO2 >88%.  ID discontinued antibiotics.   Poor I&Os documented. Received 40 mg IV furosemide yesterday. Renal function normal. Echo ordered yesterday pending. Would recommend more accurate documentation of I&Os. Recommend additional trial of volume removal with repeat dosing of 40 mg IV furosemide.  Send VBG to rule out hypercarbia as etiology of encephalopathy.  May need bronchscopy to rule out atypical infections given immunomodulator therapy for her cancer. Send LDH to screen for PJP.   However, given her current oxygen requirements she is a poor candidate for bronchoscopy and would hold off until this can be improved. Recommend reinitiaion of steroid therapy. Would start 60 mg prednisone. Slow taper.    All other care per primary team.         SUBJECTIVE     Patient seen and examined. Took of MFNC last night, desat. Escalated supplemental O2. Feeling about the same. Worse with activity. Increased SOB.    OBJECTIVE     Vitals:    01/27/24 0249 01/27/24 0600 01/27/24 0736 01/27/24 0842   BP:   (!) 85/53 (!) 84/53   BP Location:       Pulse: (!) 106  103 (!) 109   Resp:       Temp:   98.8 °F (37.1 °C)    TempSrc:       SpO2: 94%  (!) 89% 96%   Weight:  75.3 kg (166 lb 0.1 oz)     Height:          Temperature:   Temp (24hrs),  Av.7 °F (37.1 °C), Min:97.9 °F (36.6 °C), Max:99.9 °F (37.7 °C)    Temperature: 98.8 °F (37.1 °C)  Intake & Output:  I/O          0701   07 0701   0700  0701   0700    P.O.  720 240    Total Intake(mL/kg)  720 (9.6) 240 (3.2)    Net  +720 +240           Unmeasured Urine Occurrence 1 x 1 x           Weights:   IBW (Ideal Body Weight): 63.9 kg    Body mass index is 25.24 kg/m².  Weight (last 2 days)       Date/Time Weight    24 06 75.3 (166.01)    24 0452 75.3 (166)    24 06 76.8 (169.31)          Physical Exam  Vitals and nursing note reviewed.   Constitutional:       General: She is not in acute distress.     Appearance: Normal appearance. She is well-developed. She is obese. She is ill-appearing. She is not toxic-appearing.      Interventions: She is not intubated.  HENT:      Head: Normocephalic and atraumatic.      Right Ear: External ear normal.      Left Ear: External ear normal.      Nose: Nose normal.      Mouth/Throat:      Mouth: Mucous membranes are moist.      Pharynx: Oropharynx is clear.   Eyes:      General: No scleral icterus.     Conjunctiva/sclera: Conjunctivae normal.   Cardiovascular:      Rate and Rhythm: Normal rate and regular rhythm.      Pulses: Normal pulses.      Heart sounds: Normal heart sounds. No murmur heard.     No friction rub. No gallop.   Pulmonary:      Effort: Pulmonary effort is normal. No tachypnea, bradypnea, accessory muscle usage or respiratory distress. She is not intubated.      Breath sounds: Normal air entry. No decreased air movement. Rales present. No decreased breath sounds, wheezing or rhonchi.   Abdominal:      General: Abdomen is flat. Bowel sounds are normal.      Palpations: Abdomen is soft.   Musculoskeletal:         General: No swelling or tenderness.      Cervical back: Neck supple. No tenderness.   Skin:     General: Skin is warm and dry.   Neurological:      General: No focal deficit present.       Mental Status: She is alert. Mental status is at baseline. She is disoriented.           LABORATORY DATA     Labs: I have personally reviewed pertinent reports.  Results from last 7 days   Lab Units 01/26/24 0454 01/25/24 1739 01/23/24 0504 01/22/24  0618   WBC Thousand/uL 4.28* 5.65 7.94 10.38*   HEMOGLOBIN g/dL 11.2* 12.4 11.9 12.6   HEMATOCRIT % 34.7* 39.0 38.1 39.6   PLATELETS Thousands/uL 161 189 228 244   MONO PCT %  --  2*  --  3*   EOS PCT %  --  0  --  0      Results from last 7 days   Lab Units 01/26/24 0454 01/25/24 1739 01/23/24 0504 01/22/24  0618 01/21/24  0505   POTASSIUM mmol/L 4.2 3.8 4.7   < > 4.4   CHLORIDE mmol/L 111* 103 107   < > 109*   CO2 mmol/L 26 27 27   < > 28   BUN mg/dL 24 35* 34*   < > 28*   CREATININE mg/dL 1.04 1.14 1.08   < > 1.00   CALCIUM mg/dL 8.7 9.3 9.5   < > 9.5   ALK PHOS U/L  --   --  87  --  89   ALT U/L  --   --  33  --  48   AST U/L  --   --  18  --  25    < > = values in this interval not displayed.                              ABG:       Micro:   Results from last 7 days   Lab Units 01/25/24 2120 01/25/24  1732   BLOOD CULTURE   --  No Growth at 24 hrs.  No Growth at 24 hrs.   SPUTUM CULTURE  Culture results to follow.  --    GRAM STAIN RESULT  Rare Epithelial cells per low power field  No Polys or Bacteria seen  --          IMAGING & DIAGNOSTIC TESTING     Radiology Results: I have personally reviewed pertinent reports.  XR chest portable ICU    Result Date: 1/15/2024  Impression: Worsening diffuse bilateral airspace opacities. Workstation performed: [id number]     XR chest portable ICU    Result Date: 1/15/2024  Impression: Persistent bilateral multi lobar opacities. Workstation performed: [id number]     EEG Video Monitoring 24 Hour    Addendum Date: 1/14/2024    Correction on the total time of this study. The total monitoring period for this report is nearly 22.5 hours.     XR chest portable ICU    Result Date: 1/12/2024  Impression: Worsening left-sided  pulmonary opacities concerning for multi lobar infiltrates. Workstation performed: [id number]     XR chest portable ICU    Result Date: 1/11/2024  Impression: 1. Persistence of bilateral hazy diffuse airspace opacities in the lungs, without significant change, again suspicious for pneumonia 2. Nasogastric tube extends below left hemidiaphragm Workstation performed: [id number]     MRI brain seizure wo and w contrast    Result Date: 1/11/2024  Impression: Previously visualized diffusion signal abnormality in the right basal ganglia is no longer identified and may have been artifactual given underlying susceptibility artifact in the bilateral globus pallidus. Workstation performed: [id number]     Other Diagnostic Testing: I have personally reviewed pertinent reports.    ACTIVE MEDICATIONS     Current Facility-Administered Medications   Medication Dose Route Frequency    acetaminophen (TYLENOL) tablet 650 mg  650 mg Oral Q6H PRN    bisacodyl (DULCOLAX) rectal suppository 10 mg  10 mg Rectal Daily PRN    chlorhexidine (PERIDEX) 0.12 % oral rinse 15 mL  15 mL Mouth/Throat Q12H SARTHAK    enoxaparin (LOVENOX) subcutaneous injection 40 mg  40 mg Subcutaneous Daily    insulin lispro (HumaLOG) 100 units/mL subcutaneous injection 2-12 Units  2-12 Units Subcutaneous TID With Meals    insulin lispro (HumaLOG) 100 units/mL subcutaneous injection 2-12 Units  2-12 Units Subcutaneous HS    lamoTRIgine (LaMICtal) tablet 150 mg  150 mg Oral BID    melatonin tablet 6 mg  6 mg Oral HS    metoprolol (LOPRESSOR) injection 2.5 mg  2.5 mg Intravenous Q6H PRN    ondansetron (ZOFRAN) injection 4 mg  4 mg Intravenous Q6H PRN    polyethylene glycol (MIRALAX) packet 17 g  17 g Oral Daily    QUEtiapine (SEROquel) tablet 25 mg  25 mg Oral HS    senna-docusate sodium (SENOKOT S) 8.6-50 mg per tablet 2 tablet  2 tablet Oral BID    topiramate (TOPAMAX) tablet 100 mg  100 mg Oral BID    venlafaxine (EFFEXOR) tablet 25 mg  25 mg Oral TID With Meals  "        Disclaimer: Portions of the record may have been created with voice recognition software. Occasional wrong word or \"sound a like\" substitutions may have occurred due to the inherent limitations of voice recognition software. Careful consideration should be taken to recognize, using context, where substitutions have occurred.    Scot Monsalve,    Pulmonary and Critical Care Fellow, PGY-VI  [facility]       "

## 2024-01-28 ENCOUNTER — APPOINTMENT (INPATIENT)
Dept: NON INVASIVE DIAGNOSTICS | Facility: HOSPITAL | Age: 59
DRG: 003 | End: 2024-01-28
Payer: COMMERCIAL

## 2024-01-28 ENCOUNTER — APPOINTMENT (INPATIENT)
Dept: RADIOLOGY | Facility: HOSPITAL | Age: 59
DRG: 003 | End: 2024-01-28
Payer: COMMERCIAL

## 2024-01-28 LAB
ANION GAP SERPL CALCULATED.3IONS-SCNC: 9 MMOL/L
ANISOCYTOSIS BLD QL SMEAR: PRESENT
BACTERIA SPT RESP CULT: ABNORMAL
BACTERIA SPT RESP CULT: ABNORMAL
BASOPHILS # BLD MANUAL: 0 THOUSAND/UL (ref 0–0.1)
BASOPHILS NFR MAR MANUAL: 0 % (ref 0–1)
BUN SERPL-MCNC: 27 MG/DL (ref 5–25)
CALCIUM SERPL-MCNC: 9.7 MG/DL (ref 8.4–10.2)
CHLORIDE SERPL-SCNC: 106 MMOL/L (ref 96–108)
CO2 SERPL-SCNC: 25 MMOL/L (ref 21–32)
CREAT SERPL-MCNC: 1.17 MG/DL (ref 0.6–1.3)
EOSINOPHIL # BLD MANUAL: 0 THOUSAND/UL (ref 0–0.4)
EOSINOPHIL NFR BLD MANUAL: 0 % (ref 0–6)
ERYTHROCYTE [DISTWIDTH] IN BLOOD BY AUTOMATED COUNT: 17.9 % (ref 11.6–15.1)
GFR SERPL CREATININE-BSD FRML MDRD: 51 ML/MIN/1.73SQ M
GIANT PLATELETS BLD QL SMEAR: PRESENT
GLUCOSE SERPL-MCNC: 132 MG/DL (ref 65–140)
GLUCOSE SERPL-MCNC: 135 MG/DL (ref 65–140)
GLUCOSE SERPL-MCNC: 165 MG/DL (ref 65–140)
GLUCOSE SERPL-MCNC: 191 MG/DL (ref 65–140)
GLUCOSE SERPL-MCNC: 252 MG/DL (ref 65–140)
GRAM STN SPEC: ABNORMAL
GRAM STN SPEC: ABNORMAL
HCT VFR BLD AUTO: 41.5 % (ref 34.8–46.1)
HGB BLD-MCNC: 12.8 G/DL (ref 11.5–15.4)
LYMPHOCYTES # BLD AUTO: 0.29 THOUSAND/UL (ref 0.6–4.47)
LYMPHOCYTES # BLD AUTO: 3 % (ref 14–44)
MAGNESIUM SERPL-MCNC: 2.1 MG/DL (ref 1.9–2.7)
MCH RBC QN AUTO: 30.1 PG (ref 26.8–34.3)
MCHC RBC AUTO-ENTMCNC: 30.8 G/DL (ref 31.4–37.4)
MCV RBC AUTO: 98 FL (ref 82–98)
METAMYELOCYTES NFR BLD MANUAL: 2 % (ref 0–1)
MONOCYTES # BLD AUTO: 0.07 THOUSAND/UL (ref 0–1.22)
MONOCYTES NFR BLD: 1 % (ref 4–12)
MYELOCYTES NFR BLD MANUAL: 1 % (ref 0–1)
NEUTROPHILS # BLD MANUAL: 6.77 THOUSAND/UL (ref 1.85–7.62)
NEUTS BAND NFR BLD MANUAL: 25 % (ref 0–8)
NEUTS SEG NFR BLD AUTO: 67 % (ref 43–75)
PLATELET # BLD AUTO: 171 THOUSANDS/UL (ref 149–390)
PLATELET BLD QL SMEAR: ADEQUATE
PMV BLD AUTO: 10.8 FL (ref 8.9–12.7)
POIKILOCYTOSIS BLD QL SMEAR: PRESENT
POLYCHROMASIA BLD QL SMEAR: PRESENT
POTASSIUM SERPL-SCNC: 4.2 MMOL/L (ref 3.5–5.3)
PROCALCITONIN SERPL-MCNC: 0.12 NG/ML
RBC # BLD AUTO: 4.25 MILLION/UL (ref 3.81–5.12)
RBC MORPH BLD: PRESENT
SODIUM SERPL-SCNC: 140 MMOL/L (ref 135–147)
VARIANT LYMPHS # BLD AUTO: 1 %
WBC # BLD AUTO: 7.36 THOUSAND/UL (ref 4.31–10.16)

## 2024-01-28 PROCEDURE — 94760 N-INVAS EAR/PLS OXIMETRY 1: CPT

## 2024-01-28 PROCEDURE — 71045 X-RAY EXAM CHEST 1 VIEW: CPT

## 2024-01-28 PROCEDURE — 83735 ASSAY OF MAGNESIUM: CPT | Performed by: STUDENT IN AN ORGANIZED HEALTH CARE EDUCATION/TRAINING PROGRAM

## 2024-01-28 PROCEDURE — 93306 TTE W/DOPPLER COMPLETE: CPT | Performed by: INTERNAL MEDICINE

## 2024-01-28 PROCEDURE — 84145 PROCALCITONIN (PCT): CPT | Performed by: STUDENT IN AN ORGANIZED HEALTH CARE EDUCATION/TRAINING PROGRAM

## 2024-01-28 PROCEDURE — 85027 COMPLETE CBC AUTOMATED: CPT

## 2024-01-28 PROCEDURE — 82948 REAGENT STRIP/BLOOD GLUCOSE: CPT

## 2024-01-28 PROCEDURE — 99291 CRITICAL CARE FIRST HOUR: CPT | Performed by: STUDENT IN AN ORGANIZED HEALTH CARE EDUCATION/TRAINING PROGRAM

## 2024-01-28 PROCEDURE — 80048 BASIC METABOLIC PNL TOTAL CA: CPT | Performed by: STUDENT IN AN ORGANIZED HEALTH CARE EDUCATION/TRAINING PROGRAM

## 2024-01-28 PROCEDURE — 99232 SBSQ HOSP IP/OBS MODERATE 35: CPT | Performed by: INTERNAL MEDICINE

## 2024-01-28 PROCEDURE — 85007 BL SMEAR W/DIFF WBC COUNT: CPT

## 2024-01-28 PROCEDURE — 93306 TTE W/DOPPLER COMPLETE: CPT

## 2024-01-28 RX ORDER — ENOXAPARIN SODIUM 100 MG/ML
40 INJECTION SUBCUTANEOUS DAILY
Status: DISCONTINUED | OUTPATIENT
Start: 2024-01-28 | End: 2024-01-28

## 2024-01-28 RX ORDER — FUROSEMIDE 10 MG/ML
40 INJECTION INTRAMUSCULAR; INTRAVENOUS ONCE
Status: COMPLETED | OUTPATIENT
Start: 2024-01-28 | End: 2024-01-28

## 2024-01-28 RX ORDER — MINOCYCLINE HYDROCHLORIDE 100 MG/1
200 CAPSULE ORAL EVERY 12 HOURS SCHEDULED
Status: DISCONTINUED | OUTPATIENT
Start: 2024-01-28 | End: 2024-02-02

## 2024-01-28 RX ORDER — MIDODRINE HYDROCHLORIDE 5 MG/1
5 TABLET ORAL ONCE
Status: COMPLETED | OUTPATIENT
Start: 2024-01-28 | End: 2024-01-28

## 2024-01-28 RX ORDER — DEXAMETHASONE SODIUM PHOSPHATE 4 MG/ML
4 INJECTION, SOLUTION INTRA-ARTICULAR; INTRALESIONAL; INTRAMUSCULAR; INTRAVENOUS; SOFT TISSUE ONCE
Status: COMPLETED | OUTPATIENT
Start: 2024-01-28 | End: 2024-01-28

## 2024-01-28 RX ORDER — SULFAMETHOXAZOLE AND TRIMETHOPRIM 800; 160 MG/1; MG/1
2 TABLET ORAL EVERY 12 HOURS SCHEDULED
Status: DISCONTINUED | OUTPATIENT
Start: 2024-01-28 | End: 2024-02-02

## 2024-01-28 RX ORDER — ALBUMIN (HUMAN) 12.5 G/50ML
12.5 SOLUTION INTRAVENOUS ONCE
Status: COMPLETED | OUTPATIENT
Start: 2024-01-28 | End: 2024-01-28

## 2024-01-28 RX ORDER — FUROSEMIDE 10 MG/ML
20 INJECTION INTRAMUSCULAR; INTRAVENOUS ONCE
Status: COMPLETED | OUTPATIENT
Start: 2024-01-28 | End: 2024-01-28

## 2024-01-28 RX ADMIN — INSULIN LISPRO 2 UNITS: 100 INJECTION, SOLUTION INTRAVENOUS; SUBCUTANEOUS at 21:39

## 2024-01-28 RX ADMIN — ALBUMIN HUMAN 12.5 G: 0.25 SOLUTION INTRAVENOUS at 03:34

## 2024-01-28 RX ADMIN — CHLORHEXIDINE GLUCONATE 15 ML: 1.2 SOLUTION ORAL at 21:39

## 2024-01-28 RX ADMIN — ACETAMINOPHEN 650 MG: 325 TABLET, FILM COATED ORAL at 14:55

## 2024-01-28 RX ADMIN — VENLAFAXINE 25 MG: 25 TABLET ORAL at 08:37

## 2024-01-28 RX ADMIN — LAMOTRIGINE 150 MG: 100 TABLET ORAL at 08:37

## 2024-01-28 RX ADMIN — MIDODRINE HYDROCHLORIDE 5 MG: 5 TABLET ORAL at 03:37

## 2024-01-28 RX ADMIN — LAMOTRIGINE 150 MG: 100 TABLET ORAL at 21:39

## 2024-01-28 RX ADMIN — CHLORHEXIDINE GLUCONATE 15 ML: 1.2 SOLUTION ORAL at 08:36

## 2024-01-28 RX ADMIN — TOPIRAMATE 100 MG: 100 TABLET, FILM COATED ORAL at 18:47

## 2024-01-28 RX ADMIN — SULFAMETHOXAZOLE AND TRIMETHOPRIM 2 TABLET: 800; 160 TABLET ORAL at 08:37

## 2024-01-28 RX ADMIN — SENNOSIDES, DOCUSATE SODIUM 2 TABLET: 8.6; 5 TABLET ORAL at 18:47

## 2024-01-28 RX ADMIN — PREDNISONE 60 MG: 20 TABLET ORAL at 08:37

## 2024-01-28 RX ADMIN — VENLAFAXINE 25 MG: 25 TABLET ORAL at 16:22

## 2024-01-28 RX ADMIN — MINOCYCLINE HYDROCHLORIDE 200 MG: 100 CAPSULE ORAL at 21:39

## 2024-01-28 RX ADMIN — ENOXAPARIN SODIUM 40 MG: 40 INJECTION SUBCUTANEOUS at 08:38

## 2024-01-28 RX ADMIN — MINOCYCLINE HYDROCHLORIDE 200 MG: 100 CAPSULE ORAL at 08:38

## 2024-01-28 RX ADMIN — SULFAMETHOXAZOLE AND TRIMETHOPRIM 2 TABLET: 800; 160 TABLET ORAL at 21:39

## 2024-01-28 RX ADMIN — FUROSEMIDE 20 MG: 10 INJECTION, SOLUTION INTRAMUSCULAR; INTRAVENOUS at 04:43

## 2024-01-28 RX ADMIN — QUETIAPINE 25 MG: 25 TABLET ORAL at 21:39

## 2024-01-28 RX ADMIN — FUROSEMIDE 40 MG: 10 INJECTION, SOLUTION INTRAMUSCULAR; INTRAVENOUS at 16:18

## 2024-01-28 RX ADMIN — INSULIN LISPRO 6 UNITS: 100 INJECTION, SOLUTION INTRAVENOUS; SUBCUTANEOUS at 16:19

## 2024-01-28 RX ADMIN — DEXAMETHASONE SODIUM PHOSPHATE 4 MG: 4 INJECTION INTRA-ARTICULAR; INTRALESIONAL; INTRAMUSCULAR; INTRAVENOUS; SOFT TISSUE at 02:57

## 2024-01-28 NOTE — CASE MANAGEMENT
Case Management Discharge Planning Note    Patient name Carla Hunt  Location [facility] 207-01 MRN [id number]  : 1965 Date 2024       Current Admission Date: 1/10/2024  Current Admission Diagnosis:Acute respiratory failure with hypoxia (HCC)   Patient Active Problem List    Diagnosis Date Noted    Viral pneumonia 2024    Seizure disorder (HCC) 2024    Sepsis (HCC) 2024    Acute respiratory failure with hypoxia (HCC) 2024    Transaminitis 2024    Teto marginal zone B-cell lymphoma (HCC) 2024    COVID 2024    Stage 3b chronic kidney disease (CKD) ([facility]) 2024    Abnormal CT of the head 2024    Nephrolithiasis 2021    Other specified anxiety disorders 2019    Reactive depression 2019    Hidradenitis suppurativa of left axilla 2019    Anxiety state 2018    Luetscher's syndrome 2018    Disorder of parathyroid gland ([facility]) 2018    Eczema 2018    Gastroenteritis 2018    Grand mal status ([facility]) 2018    Hypercalcemia 2018    Hypertensive disorder 2018    Impacted cerumen 2018    Open wound of hand except fingers 2018    Otitis externa 2018    Overweight 2018    Pain in face 2018    Skin sensation disturbance 2018    Subjective visual disturbance 2018    Encounter for gynecological examination (general) (routine) without abnormal findings 10/23/2018    Long term current use of hormonal contraceptive 10/23/2018    Rosacea 2015      LOS (days): 18  Geometric Mean LOS (GMLOS) (days):   Days to GMLOS:     OBJECTIVE:  Risk of Unplanned Readmission Score: 19.58         Current admission status: Inpatient   Preferred Pharmacy:   [id number] [place] [facility] - [address]  [facility] [place] [address]  Phone: [phone] Fax: [phone]    [facility] - [address]  [address] [place] [address]  Phone: [phone] Fax: [phone]    Primary Care Provider: Tony Guevara MD    Primary Insurance: [employer]  Secondary Insurance: MEDICARE    DISCHARGE DETAILS:    Pt transferred from the floor due to worsening O2 sats.   CM will continue to follow for any change in d/c plan.  Pt's plan previously was to d/c home with [facility] and home O2 as per previous home O2 eval.

## 2024-01-28 NOTE — UTILIZATION REVIEW
Continued Stay Review    Date: 1/28                            Current Patient Class: inpt   Current Level of Care: MS     HPI:58 y.o. female initially admitted on   1/10/24  w/nancy hypoxic resp failure 2/2  pna.     1/28    Assessment/Plan:   patient has developed worsening hypoxia over the last few days, acutely worse overnight, currently on high flow O2 support.  Chest CT more suggestive of COVID and postviral bacterial pneumonia.  Procalcitonin x 3 have been in the normal range.  Patient has no fever or leukocytosis.  She has minimal cough.  I suspect that her worsening hypoxia is still all secondary to inflammation from recent COVID.  Antibiotic was discontinued.  Systemic corticosteroid was restarted yesterday.   STAT CXR pending     Vital Signs:   01/28/24 1014 97.8 °F (36.6 °C) -- 24 Abnormal  88/62 Abnormal  -- -- -- -- -- -- -- --   01/28/24 0944 -- -- -- -- -- -- -- -- -- -- HFNC prongs --   01/28/24 09:27:30 97.6 °F (36.4 °C) -- 20 93/61 72 -- -- -- -- -- -- --   01/28/24 0830 -- -- -- -- -- -- -- -- -- -- HFNC prongs --   01/28/24 08:06:14 97.6 °F (36.4 °C) -- 20 93/64 74 -- -- -- -- -- -- --   01/28/24 08:04:34 97.6 °F (36.4 °C) 88 18 93/64 74 88 % Abnormal  -- -- -- -- -- --   01/28/24 0756 -- 97 -- -- -- 91 % 240 -- 55 L/min High flow nasal cannula -- --   01/28/24 0525 -- -- -- -- -- 95 % -- -- -- -- -- --   01/28/24 04:45:28 -- 80 -- 113/67 82 98 % -- -- -- -- -- --   01/28/24 0250 -- -- -- -- -- 92 % -- -- -- -- -- --   01/28/24 02:24:52 98.2 °F (36.8 °C) 95 -- 91/58 69 88 % Abnormal  -- -- -- -- -- --   01/28/24 02:19:05 98.2 °F (36.8 °C) 93 -- 90/57 68 84 % Abnormal  -- -- -- -- -- --   01/28/24 0215 -- -- -- -- -- -- 80 -- 15 L/min Mid flow nasal cannula -- --       Pertinent Labs/Diagnostic Results:         Results from last 7 days   Lab Units 01/28/24  0555 01/26/24  0454 01/25/24  1739 01/23/24  0504 01/22/24  0618   WBC Thousand/uL 7.36 4.28* 5.65 7.94 10.38*   HEMOGLOBIN g/dL 12.8 11.2*  12.4 11.9 12.6   HEMATOCRIT % 41.5 34.7* 39.0 38.1 39.6   PLATELETS Thousands/uL 171 161 189 228 244   BANDS PCT % 25*  --  13*  --  5         Results from last 7 days   Lab Units 01/28/24  0759 01/26/24  0454 01/25/24 1739 01/23/24  0504 01/22/24  0618   SODIUM mmol/L 140 140 136 140 140   POTASSIUM mmol/L 4.2 4.2 3.8 4.7 4.7   CHLORIDE mmol/L 106 111* 103 107 107   CO2 mmol/L 25 26 27 27 27   ANION GAP mmol/L 9 3 6 6 6   BUN mg/dL 27* 24 35* 34* 34*   CREATININE mg/dL 1.17 1.04 1.14 1.08 1.05   EGFR ml/min/1.73sq m 51 59 53 57 59   CALCIUM mg/dL 9.7 8.7 9.3 9.5 9.7   MAGNESIUM mg/dL 2.1  --   --   --   --      Results from last 7 days   Lab Units 01/28/24  1136 01/28/24  0804 01/27/24 2053 01/27/24  1548 01/27/24  1111 01/27/24  0659 01/27/24  0008 01/26/24 2052 01/26/24  1647 01/26/24  1113 01/26/24  0558 01/25/24  2122   POC GLUCOSE mg/dl 191* 135 161* 170* 138 110 109 134 90 154* 94 97     Results from last 7 days   Lab Units 01/28/24  0759 01/26/24  0454 01/25/24  1739 01/23/24  0504 01/22/24  0618   GLUCOSE RANDOM mg/dL 132 83 112 101 139     Results from last 7 days   Lab Units 01/27/24  1228   PH NICA  7.333   PCO2 NICA mm Hg 48.0   PO2 NICA mm Hg 46.4*   HCO3 NICA mmol/L 24.9   BASE EXC NICA mmol/L -1.3   O2 CONTENT NICA ml/dL 12.9   O2 HGB, VENOUS % 77.0     Results from last 7 days   Lab Units 01/28/24  0759 01/27/24  0510 01/25/24  1739   PROCALCITONIN ng/ml 0.12 0.17 0.17     Results from last 7 days   Lab Units 01/26/24  0454   BNP pg/mL 12     Results from last 7 days   Lab Units 01/25/24  2120 01/25/24  1732   BLOOD CULTURE   --  No Growth at 48 hrs.  No Growth at 48 hrs.   SPUTUM CULTURE  Few Colonies of Stenotrophomonas maltophilia*  2+ Growth of  --    GRAM STAIN RESULT  Rare Epithelial cells per low power field  No Polys or Bacteria seen  --                    Medications:   Scheduled Medications:  chlorhexidine, 15 mL, Mouth/Throat, Q12H SARTHAK  enoxaparin, 40 mg, Subcutaneous, Daily  furosemide,  40 mg, Intravenous, Once  insulin lispro, 2-12 Units, Subcutaneous, TID With Meals  insulin lispro, 2-12 Units, Subcutaneous, HS  lamoTRIgine, 150 mg, Oral, BID  melatonin, 6 mg, Oral, HS  minocycline, 200 mg, Oral, Q12H SARTHAK  polyethylene glycol, 17 g, Oral, Daily  predniSONE, 60 mg, Oral, Daily  QUEtiapine, 25 mg, Oral, HS  senna-docusate sodium, 2 tablet, Oral, BID  sulfamethoxazole-trimethoprim, 2 tablet, Oral, Q12H SARTHAK  topiramate, 100 mg, Oral, BID  venlafaxine, 25 mg, Oral, TID With Meals      Continuous IV Infusions:     PRN Meds:  acetaminophen, 650 mg, Oral, Q6H PRN  bisacodyl, 10 mg, Rectal, Daily PRN  metoprolol, 2.5 mg, Intravenous, Q6H PRN  ondansetron, 4 mg, Intravenous, Q6H PRN        Discharge Plan: d    Network Utilization Review Department  ATTENTION: Please call with any questions or concerns to [phone] and carefully listen to the prompts so that you are directed to the right person. All voicemails are confidential.   For Discharge needs, contact Care Management DC Support Team at [phone] opt. [phone]  Send all requests for admission clinical reviews, approved or denied determinations and any other requests to dedicated fax number below belonging to the [facility] where the patient is receiving treatment. List of dedicated fax numbers for the Facilities:  FACILITY NAME UR FAX NUMBER   ADMISSION DENIALS (Administrative/Medical Necessity) [phone]   DISCHARGE SUPPORT TEAM (NETWORK) [phone]   PARENT CHILD HEALTH (Maternity/NICU/Pediatrics) [phone]   [facility] [phone]   [facility] [phone]   [facility] [phone]   [facility] [phone]   Critical access hospital [phone]   [facility] [phone]   [facility] [phone]   [facility] [phone]   [facility]  [facility] [phone]   [facility] [phone]   [facility] [phone]   [facility] [phone]

## 2024-01-28 NOTE — PROGRESS NOTES
Report called to Mellissa Lu in ICCU. All questions answered. Pt transported in stable condition by this RN and RT with all belongings to 207 with  at bedside.

## 2024-01-28 NOTE — PROGRESS NOTES
Progress Note - Infectious Disease   Carla Hunt 58 y.o. female MRN: [id number]  Unit/Bed#: [facility] 207-01 Encounter: [id number]      Impression/Recommendations:  Acute hypoxic respiratory failure, present on admission, secondary to mostly COVID but there was concern for concurrent bacterial pneumonia on admission due to elevated procalcitonin.  Therefore, patient completed treatment course for both COVID and bacterial pneumonia.  She was clinically improved with decreasing O2 requirement.  However, patient has developed worsening hypoxia over the last few days, acutely worse overnight, currently on high flow O2 support.  Chest CT more suggestive of COVID and postviral bacterial pneumonia.  Procalcitonin x 3 have been in the normal range.  Patient has no fever or leukocytosis.  She has minimal cough.  I suspect that her worsening hypoxia is still all secondary to inflammation from recent COVID.  Antibiotic was discontinued.  Systemic corticosteroid was restarted yesterday.  Continue to observe off antibiotic.  Continue systemic corticosteroid.  Monitor temperature/WBC.  Monitor respiratory symptoms  Monitor O2 saturation and requirement.     Severe COVID, present on admission.  Patient was treated with remdesivir, dexamethasone and was given 1 dose of Tocilizumab.  Given worsening hypoxic respiratory failure and still significant inflammation on chest CT, patient may benefit from another course of systemic corticosteroid.  Patient had 2 negative COVID antigen tests after treatment.  She was started back on systemic corticosteroids yesterday.  Worsening hypoxia overnight noted, currently on high flow O2 support.  No additional antiviral needed.  No further need for antibiotic, as above.  Continue systemic corticosteroids.     CKD.  Creatinine at baseline.  Monitor creatinine.    Encephalopathy on admission.  This has resolved.  There was concern for possible seizure but no witnessed seizure and EEG did not  capture it.  Monitor mental status.     Seizure disorder, status post temporal lobe resection in .     B-cell lymphoma, on chemotherapy, which is currently postponed.  Patient was leukopenic on admission but WBC now is near normal range.  Monitor WBC/ANC.     Discussed with patient in detail regarding the above plan.     Antibiotics:  Off antibiotic     Subjective:  Patient was seen earlier.  She developed worsening hypoxia overnight.  She is now on high flow O2 support.  Stable mild cough, mostly nonproductive.  Temperature stays down.  No chills.  No diarrhea.    Objective:  Vitals:  Temp:  [97.6 °F (36.4 °C)-98.9 °F (37.2 °C)] 97.6 °F (36.4 °C)  HR:  [] 88  Resp:  [18-20] 20  BP: ()/(57-70) 93/61  SpO2:  [84 %-98 %] 88 %  Temp (24hrs), Av.1 °F (36.7 °C), Min:97.6 °F (36.4 °C), Max:98.9 °F (37.2 °C)  Current: Temperature: 97.6 °F (36.4 °C)    Physical Exam:     General: Awake, alert, cooperative, no distress.   Neck:  Supple. No mass.  No lymphadenopathy.   Lungs: Expansion symmetric, diffuse rhonchi, basilar rales, no wheezing, respirations labored.   Heart:  Regular rate and rhythm, S1 and S2 normal, no murmur.   Abdomen: Soft, nondistended, non-tender, bowel sounds active all four quadrants, no masses, no organomegaly.   Extremities: Trace leg edema. No erythema/warmth. No ulcer. Nontender to palpation.   Skin:  No rash.   Neuro: Moves all extremities.     Invasive Devices       Peripheral Intravenous Line  Duration             Peripheral IV 24 Distal;Right;Ventral (anterior) Forearm 2 days                    Labs studies:   I have personally reviewed pertinent labs.  Results from last 7 days   Lab Units 24  0759 24  0454 24  1739 24  0504   POTASSIUM mmol/L 4.2 4.2 3.8 4.7   CHLORIDE mmol/L 106 111* 103 107   CO2 mmol/L 25 26 27 27   BUN mg/dL 27* 24 35* 34*   CREATININE mg/dL 1.17 1.04 1.14 1.08   EGFR ml/min/1.73sq m 51 59 53 57   CALCIUM mg/dL 9.7 8.7 9.3  9.5   AST U/L  --   --   --  18   ALT U/L  --   --   --  33   ALK PHOS U/L  --   --   --  87     Results from last 7 days   Lab Units 01/28/24  0555 01/26/24  0454 01/25/24  1739   WBC Thousand/uL 7.36 4.28* 5.65   HEMOGLOBIN g/dL 12.8 11.2* 12.4   PLATELETS Thousands/uL 171 161 189     Results from last 7 days   Lab Units 01/25/24  2120 01/25/24  1732   BLOOD CULTURE   --  No Growth at 48 hrs.  No Growth at 48 hrs.   SPUTUM CULTURE  Few Colonies of - Presumptive Stenotrophomonas maltophilia*  2+ Growth of  --    GRAM STAIN RESULT  Rare Epithelial cells per low power field  No Polys or Bacteria seen  --        Imaging Studies:   I have personally reviewed pertinent imaging study reports and images in PACS.    EKG, Pathology, and Other Studies:   I have personally reviewed pertinent reports.

## 2024-01-28 NOTE — PROGRESS NOTES
[facility]  MICU Acceptance Note: Critical Care  Name: Carla Hunt 58 y.o. female I MRN: [id number]  Unit/Bed#: -01 I Date of Admission: 1/10/2024   Date of Service: 1/28/2024 I Hospital Day: 18    Assessment/Plan   Neuro:   Diagnosis: seizure disorder  S/p partial left temporal lobe resection in 2007  Contuinue home lamictal 150 bid and topamax 100 bid  Diagnosis: anxiety  Continue home effexor  Seroquel 25 hs and melatonin 6 hs for sleep    CV:   No active issues    Pulm:  Diagnosis: acute hypoxic respiratory failure   Tested COVID positive on 1/7  Completed severe COVID pathway and 7 days CTX  Initially doing well with decreasing O2 requirements after transfer out of ICU, but pulm consulted 1/26 for increasing O2 requirements as of 1/24  Steroids restarted for taper due to abrupt cessation upon completion of COVID pathway   Morning of 1/28 pt requiring HFNC 100% so was upgraded to SD1 under critical care, most recently on HFNC 55L 85% FiO2  Continue to wean supplemental O2 as tolerated for SpO2 >90%  Continue prednisone 60mg daily for now, taper TBD based on clinical course  See remainder of plan under ID    GI:   No active issues  Bowel regimen with daily miralax and bid senna     :   Diagnosis: CKD III  Baseline Cr appears to be 0.8-1.2  ELIESER on arrival but now resolved   Continue to monitor I/O and UOP    F/E/N:   F: none   E: monitor and replete for goal K>4, P>3, Mg>2  N: regular house     Heme/Onc:   Diagnosis: B cell lymphoma  Follows with heme[facility]onc at [facility]  On rituxan for 2 year course, started May 2022  Treatment currently on hold   Leukopenic on admission but WBC now wnl  DVT ppx with lovenox     Endo:   Diagnosis: steroid hyperglycemia   SSI, hypoglycemic protocol  Goal -180    ID:   Diagnosis: COVID pneumonia  Completed severe COVID pathway with decadron (ended 1/22) and remdesivir (ended 1/20), also completed 7 days CXT on 1/15  C/f  opportunistic infections given immunocompromised status on chemotherapy and recent steroid use  Prednisone 60mg daily restarted on 1/27  Given 2 days cefepime 1/26 and 1/27, ID consulted and recommended d/c abx   Abx reinitiated with minocycline 200 bid and bactrim 800-160 bid when admitted to CC due to acuity of condition   ID following, appreciate recommendations     MSK/Skin:   No active issues    Disposition: Stepdown Level 1    ICU Core Measures     A: Assess, Prevent, and Manage Pain Has pain been assessed? Yes  Need for changes to pain regimen? No   B: Both SAT/SAT  N/A   C: Choice of Sedation RASS Goal: 0 Alert and Calm  Need for changes to sedation or analgesia regimen? No   D: Delirium CAM-ICU: Negative   E: Early Mobility  Plan for early mobility? Yes   F: Family Engagement Plan for family engagement today? Yes       Antibiotic Review: Awaiting culture results.     Review of Invasive Devices:            Prophylaxis:  VTE VTE covered by:  enoxaparin, Subcutaneous, 40 mg at 01/27/24 0847       Stress Ulcer  not ordered        Significant 24hr Events     HPI: 57 yo female, PMH epilepsy, anxiety, CKD 3, B cell lymphoma on chemo. She initially presented to [facility] on 1/8 with AMS, weakness, and poor appetite. She had previously been treated for UTI outpatient and completed course of ciprofloxacin. She was found to be COVID positive on admission. Presenting symptoms were concerning for stroke, CTH revealed possible SAH but no intervention was required per neurosurgery. There was also possible lacunar infarct. Routine EEG was negative. Despite treatment condition continued to worsen. LP was performed and she was started on empiric abx for meningitis. She was transferred to [facility] for vEEG on 1/10. LP studies appeared benign. Video EEG did not reveal any seizure activity. She was treated on severe COVID pathway initially requiring HFNC and given abx with CTX. She was transferred out of the ICU on 1/16 and at that  time was gradually weaning down from MFNC. Pulmonology was consulted on 1/26 for possible bronchoscopy due to newly increasing O2 requirements since 1/24. CT chest revealed fibrosis and GGO likely from COVID along with RLL pna. ID has been following and did not recommend repeat course of abx as case appears consistent with post-viral rather than bacterial pna. Sputum cultures were unremarkable. LDH was checked given pt's immunocompromised status on chemo and was mildly elevated at 326. Steroids were reinitiated to complete taper as they had previously been abruptly d/c's upon completion of COVID pathway. This morning pt continued to have escalating O2 requirements from 10L to HFNC at 100% FiO2 - therefore decision was made to upgrade her to critical care.     Upon transfer, pt appears comfortable in room on HFNC. She states that she feels tired but overall improved from respiratory standpoint. Her family is present in the room with her and they express frustration at receiving different answers from specialists and that she has gotten worse in the last few days when she looked well enough to go home a few days ago.      Subjective     Review of Systems   Constitutional:  Positive for appetite change and fatigue. Negative for chills and fever.   HENT:  Negative for congestion.    Respiratory:  Negative for shortness of breath.    Cardiovascular:  Negative for chest pain.   Gastrointestinal:  Negative for abdominal pain, diarrhea, nausea and vomiting.   Genitourinary:  Negative for difficulty urinating.        Objective                            Vitals I/O      Most Recent Min/Max in 24hrs   Temp 98.2 °F (36.8 °C) Temp  Min: 98.2 °F (36.8 °C)  Max: 98.9 °F (37.2 °C)   Pulse 80 Pulse  Min: 80  Max: 109   Resp 18 Resp  Min: 18  Max: 18   /67 BP  Min: 84/59  Max: 113/67   O2 Sat 95 % SpO2  Min: 84 %  Max: 98 %      Intake/Output Summary (Last 24 hours) at 1/28/2024 4340  Last data filed at 1/28/2024 0533  Gross  per 24 hour   Intake 970 ml   Output 3550 ml   Net -2580 ml       Diet Regular; Regular House    Invasive Monitoring   None         Physical Exam   Physical Exam  Eyes:      General:         Right eye: No discharge.         Left eye: No discharge.      Conjunctiva/sclera: Conjunctivae normal.   Skin:     General: Skin is warm and dry.   HENT:      Head: Normocephalic and atraumatic.      Right Ear: No drainage.      Left Ear: No drainage.      Nose: No congestion or nasogastric tube.      Mouth/Throat:      Mouth: Mucous membranes are moist.   Cardiovascular:      Rate and Rhythm: Normal rate and regular rhythm.      Heart sounds: No murmur heard.  Musculoskeletal:      Right lower leg: No edema.      Left lower leg: No edema.   Abdominal: General: Bowel sounds are normal. There is no distension.      Palpations: Abdomen is soft.      Tenderness: There is no abdominal tenderness. There is no guarding.   Constitutional:       General: She is not in acute distress.     Interventions: She is not sedated, not intubated and not restrained.  Pulmonary:      Effort: Pulmonary effort is normal. No respiratory distress. She is not intubated.      Breath sounds: Rales (bilateral bases) present.   Neurological:      Mental Status: She is alert and oriented to person, place and time. Mental status is at baseline. She is calm.            Diagnostic Studies      EKG: no new  Imaging: CXR pending read but increased pulmonary edema compared to prior, no apparent consolidations or effusions I have personally reviewed pertinent films in PACS     Medications:  Scheduled PRN   chlorhexidine, 15 mL, Q12H SARTHAK  enoxaparin, 40 mg, Daily  insulin lispro, 2-12 Units, TID With Meals  insulin lispro, 2-12 Units, HS  lamoTRIgine, 150 mg, BID  melatonin, 6 mg, HS  polyethylene glycol, 17 g, Daily  predniSONE, 60 mg, Daily  QUEtiapine, 25 mg, HS  senna-docusate sodium, 2 tablet, BID  sulfamethoxazole-trimethoprim, 2 tablet, Q12H SARTHAK  topiramate,  100 mg, BID  venlafaxine, 25 mg, TID With Meals      acetaminophen, 650 mg, Q6H PRN  bisacodyl, 10 mg, Daily PRN  metoprolol, 2.5 mg, Q6H PRN  ondansetron, 4 mg, Q6H PRN       Continuous          Labs:    CBC    Recent Labs     01/28/24  0555   WBC 7.36   HGB 12.8   HCT 41.5        BMP    No recent results    Coags    No recent results     Additional Electrolytes  No recent results       Blood Gas    No recent results  Recent Labs     01/27/24  1228   PHVEN 7.333   [id number] 48.0   PO2VEN 46.4*   [id number] 24.9   BEVEN -1.3   [id number] 77.0    LFTs  No recent results    Infectious  Recent Labs     01/27/24  0510   PROCALCITONI 0.17     Glucose  No recent results                Risa Handley MD

## 2024-01-28 NOTE — QUICK NOTE
Patient was moved tonight from  to MS5. Prior to transport patient was reportedly saturating at 94% 10L. Upon arrival to MS5, the patient was found to have saturations in 70's on 10L. Patient was increased to 15L with little improvement. Eventually, the patient was placed on NRB overtop of 15L midflow which led to saturations recovering to 93%. Decision made to place patient on high flow. Patient has some wheezing and some decreased lung sounds at the bases. Patient not in distress. Denies feeling any different from baseline. Will obtain CXR. As well, will give 4mg Iv decadron. May consider diuresing with additional dose of lasix if able depending on BP. Patient is currently on 100% high flow. Will attempt to wean and maintain O2 above 88%. If O2 cannot be weaned with interventions now, will reach out to critical care for upgrade to SD1.

## 2024-01-28 NOTE — PHYSICAL THERAPY NOTE
PT cancel note       01/28/24 0907   PT Last Visit   PT Visit Date 01/28/24   Note Type   Note Type Cancelled Session   Cancel Reasons Medical status     Sumi Cho

## 2024-01-29 LAB
ANION GAP SERPL CALCULATED.3IONS-SCNC: 7 MMOL/L
AORTIC ROOT: 3.2 CM
ASCENDING AORTA: 3.4 CM
AV LVOT PEAK GRADIENT: 2 MMHG
AV PEAK GRADIENT: 5 MMHG
AV REGURGITATION PRESSURE HALF TIME: 1466 MS
BSA FOR ECHO PROCEDURE: 1.89 M2
BUN SERPL-MCNC: 34 MG/DL (ref 5–25)
CALCIUM SERPL-MCNC: 9.9 MG/DL (ref 8.4–10.2)
CHLORIDE SERPL-SCNC: 106 MMOL/L (ref 96–108)
CO2 SERPL-SCNC: 26 MMOL/L (ref 21–32)
CREAT SERPL-MCNC: 1.26 MG/DL (ref 0.6–1.3)
E WAVE DECELERATION TIME: 276 MS
E/A RATIO: 0.73
ERYTHROCYTE [DISTWIDTH] IN BLOOD BY AUTOMATED COUNT: 17.6 % (ref 11.6–15.1)
FRACTIONAL SHORTENING: 35 (ref 28–44)
GFR SERPL CREATININE-BSD FRML MDRD: 47 ML/MIN/1.73SQ M
GLUCOSE SERPL-MCNC: 132 MG/DL (ref 65–140)
GLUCOSE SERPL-MCNC: 134 MG/DL (ref 65–140)
GLUCOSE SERPL-MCNC: 249 MG/DL (ref 65–140)
GLUCOSE SERPL-MCNC: 79 MG/DL (ref 65–140)
GLUCOSE SERPL-MCNC: 83 MG/DL (ref 65–140)
HCT VFR BLD AUTO: 36.5 % (ref 34.8–46.1)
HGB BLD-MCNC: 11.8 G/DL (ref 11.5–15.4)
INTERVENTRICULAR SEPTUM IN DIASTOLE (PARASTERNAL SHORT AXIS VIEW): 1 CM
INTERVENTRICULAR SEPTUM: 1 CM (ref 0.6–1.1)
LAAS-AP2: 14.1 CM2
LAAS-AP4: 9.7 CM2
LEFT ATRIUM SIZE: 2.8 CM
LEFT ATRIUM VOLUME (MOD BIPLANE): 25 ML
LEFT ATRIUM VOLUME INDEX (MOD BIPLANE): 13.2 ML/M2
LEFT INTERNAL DIMENSION IN SYSTOLE: 2.6 CM (ref 2.1–4)
LEFT VENTRICULAR INTERNAL DIMENSION IN DIASTOLE: 4 CM (ref 3.5–6)
LEFT VENTRICULAR POSTERIOR WALL IN END DIASTOLE: 0.9 CM
LEFT VENTRICULAR STROKE VOLUME: 44 ML
LVSV (TEICH): 44 ML
MAGNESIUM SERPL-MCNC: 2.2 MG/DL (ref 1.9–2.7)
MCH RBC QN AUTO: 29.4 PG (ref 26.8–34.3)
MCHC RBC AUTO-ENTMCNC: 32.3 G/DL (ref 31.4–37.4)
MCV RBC AUTO: 91 FL (ref 82–98)
MV "A" WAVE DURATION: 133 MS
MV E'TISSUE VEL-LAT: 9 CM/S
MV E'TISSUE VEL-SEP: 9 CM/S
MV PEAK A VEL: 0.7 M/S
MV PEAK E VEL: 51 CM/S
MV STENOSIS PRESSURE HALF TIME: 80 MS
MV VALVE AREA P 1/2 METHOD: 2.75
NRBC BLD AUTO-RTO: 0 /100 WBCS
PHOSPHATE SERPL-MCNC: 3 MG/DL (ref 2.7–4.5)
PLATELET # BLD AUTO: 186 THOUSANDS/UL (ref 149–390)
PMV BLD AUTO: 11.6 FL (ref 8.9–12.7)
POTASSIUM SERPL-SCNC: 3.9 MMOL/L (ref 3.5–5.3)
RBC # BLD AUTO: 4.01 MILLION/UL (ref 3.81–5.12)
RIGHT ATRIUM AREA SYSTOLE A4C: 6.2 CM2
RIGHT VENTRICLE ID DIMENSION: 2 CM
SL CV AV DECELERATION TIME RETROGRADE: 5057 MS
SL CV AV PEAK GRADIENT RETROGRADE: 15 MMHG
SL CV LEFT ATRIUM LENGTH A2C: 4.7 CM
SL CV LV EF: 55
SL CV PED ECHO LEFT VENTRICLE DIASTOLIC VOLUME (MOD BIPLANE) 2D: 68 ML
SL CV PED ECHO LEFT VENTRICLE SYSTOLIC VOLUME (MOD BIPLANE) 2D: 24 ML
SODIUM SERPL-SCNC: 139 MMOL/L (ref 135–147)
TRICUSPID ANNULAR PLANE SYSTOLIC EXCURSION: 2.2 CM
WBC # BLD AUTO: 6.93 THOUSAND/UL (ref 4.31–10.16)

## 2024-01-29 PROCEDURE — 94760 N-INVAS EAR/PLS OXIMETRY 1: CPT

## 2024-01-29 PROCEDURE — 85027 COMPLETE CBC AUTOMATED: CPT

## 2024-01-29 PROCEDURE — 83735 ASSAY OF MAGNESIUM: CPT

## 2024-01-29 PROCEDURE — 94664 DEMO&/EVAL PT USE INHALER: CPT

## 2024-01-29 PROCEDURE — 82948 REAGENT STRIP/BLOOD GLUCOSE: CPT

## 2024-01-29 PROCEDURE — 99233 SBSQ HOSP IP/OBS HIGH 50: CPT | Performed by: INTERNAL MEDICINE

## 2024-01-29 PROCEDURE — 99232 SBSQ HOSP IP/OBS MODERATE 35: CPT | Performed by: INTERNAL MEDICINE

## 2024-01-29 PROCEDURE — 84100 ASSAY OF PHOSPHORUS: CPT

## 2024-01-29 PROCEDURE — 80048 BASIC METABOLIC PNL TOTAL CA: CPT

## 2024-01-29 RX ADMIN — VENLAFAXINE 25 MG: 25 TABLET ORAL at 17:10

## 2024-01-29 RX ADMIN — LAMOTRIGINE 150 MG: 100 TABLET ORAL at 09:30

## 2024-01-29 RX ADMIN — MELATONIN 6 MG: at 21:03

## 2024-01-29 RX ADMIN — NYSTATIN 500000 UNITS: 100000 SUSPENSION ORAL at 17:10

## 2024-01-29 RX ADMIN — LAMOTRIGINE 150 MG: 100 TABLET ORAL at 21:04

## 2024-01-29 RX ADMIN — POLYETHYLENE GLYCOL 3350 17 G: 17 POWDER, FOR SOLUTION ORAL at 09:26

## 2024-01-29 RX ADMIN — MINOCYCLINE HYDROCHLORIDE 200 MG: 100 CAPSULE ORAL at 21:04

## 2024-01-29 RX ADMIN — VENLAFAXINE 25 MG: 25 TABLET ORAL at 12:41

## 2024-01-29 RX ADMIN — SULFAMETHOXAZOLE AND TRIMETHOPRIM 2 TABLET: 800; 160 TABLET ORAL at 21:03

## 2024-01-29 RX ADMIN — PREDNISONE 60 MG: 20 TABLET ORAL at 09:29

## 2024-01-29 RX ADMIN — ENOXAPARIN SODIUM 40 MG: 40 INJECTION SUBCUTANEOUS at 09:27

## 2024-01-29 RX ADMIN — TOPIRAMATE 100 MG: 100 TABLET, FILM COATED ORAL at 09:30

## 2024-01-29 RX ADMIN — NYSTATIN 500000 UNITS: 100000 SUSPENSION ORAL at 09:29

## 2024-01-29 RX ADMIN — INSULIN LISPRO 4 UNITS: 100 INJECTION, SOLUTION INTRAVENOUS; SUBCUTANEOUS at 21:42

## 2024-01-29 RX ADMIN — NYSTATIN 500000 UNITS: 100000 SUSPENSION ORAL at 06:12

## 2024-01-29 RX ADMIN — SENNOSIDES, DOCUSATE SODIUM 2 TABLET: 8.6; 5 TABLET ORAL at 09:29

## 2024-01-29 RX ADMIN — CHLORHEXIDINE GLUCONATE 15 ML: 1.2 SOLUTION ORAL at 09:26

## 2024-01-29 RX ADMIN — QUETIAPINE 25 MG: 25 TABLET ORAL at 21:03

## 2024-01-29 RX ADMIN — CHLORHEXIDINE GLUCONATE 15 ML: 1.2 SOLUTION ORAL at 21:03

## 2024-01-29 RX ADMIN — VENLAFAXINE 25 MG: 25 TABLET ORAL at 09:30

## 2024-01-29 RX ADMIN — SULFAMETHOXAZOLE AND TRIMETHOPRIM 2 TABLET: 800; 160 TABLET ORAL at 09:29

## 2024-01-29 RX ADMIN — TOPIRAMATE 100 MG: 100 TABLET, FILM COATED ORAL at 17:10

## 2024-01-29 RX ADMIN — ACETAMINOPHEN 650 MG: 325 TABLET, FILM COATED ORAL at 09:34

## 2024-01-29 RX ADMIN — MINOCYCLINE HYDROCHLORIDE 200 MG: 100 CAPSULE ORAL at 09:30

## 2024-01-29 RX ADMIN — SENNOSIDES, DOCUSATE SODIUM 2 TABLET: 8.6; 5 TABLET ORAL at 17:10

## 2024-01-29 NOTE — RESTORATIVE TECHNICIAN NOTE
Restorative Technician Note      Patient Name: Carla Hunt     Restorative Tech Visit Date: 01/29/24  Note Type: Mobility  Patient Position Upon Consult: Supine  Activity Performed: Ambulated  Education Provided: Yes  Patient Position at End of Consult: Bedside chair; All needs within reach; Bed/Chair alarm activated    Roly Mchugh Tech

## 2024-01-29 NOTE — UTILIZATION REVIEW
Continued Stay Review    Date: 1-29-24                           Current Patient Class: inpatient  Current Level of Care: step down    HPI:58 y.o. female initially admitted on 1-10-24    Acute hypoxic respiratory failure, present on admission, secondary to mostly COVID but there was concern for concurrent bacterial pneumonia on admission due to elevated procalcitonin.  Therefore, patient completed treatment course for both COVID and bacterial pneumonia.     Assessment/Plan:      Patient developed new and worsening hypoxia for the last few days which became acute overnight requiring high flow O2.  Ct chest suggesting covid and post viral bacterial pneumonia. ID suspects this hypoxia is secondary to post covid inflammation.  Systemic corticosteroid started. Observe off antibiotics.  Today : bilateral rales at bases. Continue po prednisone 60 mg daily. Wean oxygen as able.     Vital Signs:     Date/Time Temp Pulse Resp BP MAP (mmHg) SpO2 Nasal Cannula O2 Flow Rate (L/min) O2 Device   01/29/24 0745 98.2 °F (36.8 °C) 84 -- 92/59 66 98 % -- --   01/29/24 0320 98.3 °F (36.8 °C) 76 -- 95/68 76 93 % -- --   01/28/24 2320 98.1 °F (36.7 °C) 70 -- 87/55 Abnormal  66 97 % -- --   01/28/24 1956 -- -- -- -- -- 98 % -- --   01/28/24 1905 98 °F (36.7 °C) 80 -- 115/73 86 98 % -- --   01/28/24 1510 97.9 °F (36.6 °C) 94 22 107/68 85 94 % 55 L/min High flow nasal cannula   01/28/24 1014 97.8 °F (36.6 °C) 96 24 Abnormal  88/62 Abnormal  -- 90 % 55 L/min High flow nasal cannula   01/28/24 08:04:34 97.6 °F (36.4 °C) 88 18 93/64 74 88 % Abnormal  -- --   01/28/24 0756 -- 97 -- -- -- 91 % 55 L/min High flow nasal cannula   01/28/24 04:45:28 -- 80 -- 113/67 82 98 % -- --   01/28/24 0250 -- -- -- -- -- 92 % -- --   01/28/24 02:24:52 98.2 °F (36.8 °C) 95 -- 91/58 69 88 % Abnormal  -- --   01/28/24 02:19:05 98.2 °F (36.8 °C) 93 -- 90/57 68 84 % Abnormal  -- --   01/28/24 0215 -- -- -- -- -- -- 15 L/min Mid flow nasal cannula          Pertinent Labs/Diagnostic Results:       Results from last 7 days   Lab Units 01/29/24  0612 01/28/24  0555 01/26/24  0454 01/25/24  1739 01/23/24  0504   WBC Thousand/uL 6.93 7.36 4.28* 5.65 7.94   HEMOGLOBIN g/dL 11.8 12.8 11.2* 12.4 11.9   HEMATOCRIT % 36.5 41.5 34.7* 39.0 38.1   PLATELETS Thousands/uL 186 171 161 189 228   BANDS PCT %  --  25*  --  13*  --          Results from last 7 days   Lab Units 01/29/24  0612 01/28/24  0759 01/26/24  0454 01/25/24  1739 01/23/24  0504   SODIUM mmol/L 139 140 140 136 140   POTASSIUM mmol/L 3.9 4.2 4.2 3.8 4.7   CHLORIDE mmol/L 106 106 111* 103 107   CO2 mmol/L 26 25 26 27 27   ANION GAP mmol/L 7 9 3 6 6   BUN mg/dL 34* 27* 24 35* 34*   CREATININE mg/dL 1.26 1.17 1.04 1.14 1.08   EGFR ml/min/1.73sq m 47 51 59 53 57   CALCIUM mg/dL 9.9 9.7 8.7 9.3 9.5   MAGNESIUM mg/dL 2.2 2.1  --   --   --    PHOSPHORUS mg/dL 3.0  --   --   --   --      Results from last 7 days   Lab Units 01/23/24  0504   AST U/L 18   ALT U/L 33   ALK PHOS U/L 87   TOTAL PROTEIN g/dL 5.5*   ALBUMIN g/dL 3.4*   TOTAL BILIRUBIN mg/dL 0.58     Results from last 7 days   Lab Units 01/29/24  1124 01/29/24  0752 01/28/24  2138 01/28/24  1602 01/28/24  1136 01/28/24  0804 01/27/24  2053 01/27/24  1548 01/27/24  1111 01/27/24  0659 01/27/24  0008 01/26/24 2052   POC GLUCOSE mg/dl 132 83 165* 252* 191* 135 161* 170* 138 110 109 134     Results from last 7 days   Lab Units 01/29/24  0612 01/28/24  0759 01/26/24  0454 01/25/24  1739 01/23/24  0504   GLUCOSE RANDOM mg/dL 79 132 83 112 101           Results from last 7 days   Lab Units 01/27/24  1228   PH NICA  7.333   PCO2 NICA mm Hg 48.0   PO2 NICA mm Hg 46.4*   HCO3 NICA mmol/L 24.9   BASE EXC NICA mmol/L -1.3   O2 CONTENT NICA ml/dL 12.9   O2 HGB, VENOUS % 77.0     Results from last 7 days   Lab Units 01/28/24  0759 01/27/24  0510 01/25/24  1739   PROCALCITONIN ng/ml 0.12 0.17 0.17       Results from last 7 days   Lab Units 01/26/24  0454   BNP pg/mL 12            Results from last 7 days   Lab Units 01/25/24 2120 01/25/24  1732   BLOOD CULTURE   --  No Growth at 72 hrs.  No Growth at 72 hrs.   SPUTUM CULTURE  Few Colonies of Stenotrophomonas maltophilia*  2+ Growth of  --    GRAM STAIN RESULT  Rare Epithelial cells per low power field  No Polys or Bacteria seen  --        Scheduled Medications:  chlorhexidine, 15 mL, Mouth/Throat, Q12H SARTHAK  enoxaparin, 40 mg, Subcutaneous, Daily  insulin lispro, 2-12 Units, Subcutaneous, TID With Meals  insulin lispro, 2-12 Units, Subcutaneous, HS  lamoTRIgine, 150 mg, Oral, BID  melatonin, 6 mg, Oral, HS  minocycline, 200 mg, Oral, Q12H SARTHAK  nystatin, 500,000 Units, Swish & Spit, 4x Daily  polyethylene glycol, 17 g, Oral, Daily  predniSONE, 60 mg, Oral, Daily  QUEtiapine, 25 mg, Oral, HS  senna-docusate sodium, 2 tablet, Oral, BID  sulfamethoxazole-trimethoprim, 2 tablet, Oral, Q12H SARTHAK  topiramate, 100 mg, Oral, BID  venlafaxine, 25 mg, Oral, TID With Meals      Continuous IV Infusions:     PRN Meds:  acetaminophen, 650 mg, Oral, Q6H PRN  bisacodyl, 10 mg, Rectal, Daily PRN  metoprolol, 2.5 mg, Intravenous, Q6H PRN  ondansetron, 4 mg, Intravenous, Q6H PRN        Discharge Plan: discharge to home with VNA and Home O2 on hold due to transfer step down on 1-28.      Network Utilization Review Department  ATTENTION: Please call with any questions or concerns to [phone] and carefully listen to the prompts so that you are directed to the right person. All voicemails are confidential.   For Discharge needs, contact Care Management DC Support Team at [phone] opt. 2  Send all requests for admission clinical reviews, approved or denied determinations and any other requests to dedicated fax number below belonging to the campus where the patient is receiving treatment. List of dedicated fax numbers for the Facilities:  FACILITY NAME UR FAX NUMBER   ADMISSION DENIALS (Administrative/Medical Necessity) [phone]   DISCHARGE SUPPORT  TEAM (NETWORK) [phone]   PARENT CHILD HEALTH (Maternity/NICU/Pediatrics) [phone]   [facility] [phone]   [facility] [phone]   [facility] [phone]   [facility] [phone]   [facility] [phone]   [facility] [phone]   [facility] [phone]   [facility] [phone]   [facility] [phone]   [facility] [phone]   [facility] [phone]   [facility] [phone]

## 2024-01-29 NOTE — PROGRESS NOTES
Progress Note - Infectious Disease   Carla Hunt 58 y.o. female MRN: [id number]  Unit/Bed#: [facility] 207-01 Encounter: [id number]      Impression/Recommendations:  Acute hypoxic respiratory failure, present on admission, secondary to mostly COVID but there was concern for concurrent bacterial pneumonia on admission due to elevated procalcitonin.  Therefore, patient completed treatment course for both COVID and bacterial pneumonia.  She was clinically improved with decreasing O2 requirement.  However, patient has developed worsening hypoxia over the last few days, acutely worse overnight, currently on high flow O2 support.  Chest CT more suggestive of COVID and postviral bacterial pneumonia.  Procalcitonin x 3 have been in the normal range.  Patient has no fever or leukocytosis.  She has minimal cough.  I suspect that her worsening hypoxia is still all secondary to inflammation from recent COVID.  Systemic corticosteroid was restarted.  Respiratory status is improved, most likely secondary to steroid restart.  Patient was started on Bactrim/doxycycline yesterday for stenotrophomonas in respiratory culture.  Given multiple normal procalcitonin, doubt pneumonia.  Consider tracheobronchitis..  Continue Bactrim/doxycycline per primary service.  Should keep antibiotic course brief.  Continue systemic corticosteroid.  Monitor temperature/WBC.  Monitor respiratory symptoms  Monitor O2 saturation and requirement.     Severe COVID, present on admission.  Patient was treated with remdesivir, dexamethasone and was given 1 dose of Tocilizumab.  Given worsening hypoxic respiratory failure and still significant inflammation on chest CT, patient may benefit from another course of systemic corticosteroid.  Patient had 2 negative COVID antigen tests after treatment.  She was started back on systemic corticosteroids yesterday.  Worsening hypoxia overnight noted, currently on high flow O2 support.  No additional antiviral  needed.  No further need for antibiotic, as above.  Continue systemic corticosteroids.     CKD.  Creatinine at baseline.  Monitor creatinine.     Encephalopathy on admission.  This has resolved.  There was concern for possible seizure but no witnessed seizure and EEG did not capture it.  Monitor mental status.     Seizure disorder, status post temporal lobe resection in .     B-cell lymphoma, on chemotherapy, which is currently postponed.  Patient was leukopenic on admission but WBC now is near normal range.  Monitor WBC/ANC.     Discussed with patient in detail regarding the above plan.  Discussed with Dr. Monsalve from pulmonary service regarding antibiotic recommendations above.  He is agreeable.     Antibiotics:  Off antibiotic     Subjective:  Patient is much more comfortable today.  Dyspnea is improved.  Stable mild cough, mostly nonproductive.  Temperature stays down.  No chills.  No diarrhea.    Objective:  Vitals:  Temp:  [97.9 °F (36.6 °C)-98.3 °F (36.8 °C)] 98 °F (36.7 °C)  HR:  [] 100  Resp:  [22] 22  BP: ()/(55-73) 95/60  SpO2:  [93 %-98 %] 98 %  Temp (24hrs), Av.1 °F (36.7 °C), Min:97.9 °F (36.6 °C), Max:98.3 °F (36.8 °C)  Current: Temperature: 98 °F (36.7 °C)    Physical Exam:     General: Awake, alert, cooperative, no distress.   Neck:  Supple. No mass.  No lymphadenopathy.   Lungs: Expansion symmetric, stable basilar rhonchi, no rales, no wheezing, respirations less labored.   Heart:  Regular rate and rhythm, S1 and S2 normal, no murmur.   Abdomen: Soft, nondistended, non-tender, bowel sounds active all four quadrants, no masses, no organomegaly.   Extremities: No edema. No erythema/warmth. No ulcer. Nontender to palpation.   Skin:  No rash.   Neuro: Moves all extremities.     Invasive Devices       Peripheral Intravenous Line  Duration             Peripheral IV 24 Proximal;Right;Ventral (anterior) Forearm <1 day              Drain  Duration             External Urinary  Catheter <1 day                    Labs studies:   I have personally reviewed pertinent labs.  Results from last 7 days   Lab Units 01/29/24  0612 01/28/24  0759 01/26/24  0454 01/25/24  1739 01/23/24  0504   POTASSIUM mmol/L 3.9 4.2 4.2   < > 4.7   CHLORIDE mmol/L 106 106 111*   < > 107   CO2 mmol/L 26 25 26   < > 27   BUN mg/dL 34* 27* 24   < > 34*   CREATININE mg/dL 1.26 1.17 1.04   < > 1.08   EGFR ml/min/1.73sq m 47 51 59   < > 57   CALCIUM mg/dL 9.9 9.7 8.7   < > 9.5   AST U/L  --   --   --   --  18   ALT U/L  --   --   --   --  33   ALK PHOS U/L  --   --   --   --  87    < > = values in this interval not displayed.     Results from last 7 days   Lab Units 01/29/24  0612 01/28/24  0555 01/26/24  0454   WBC Thousand/uL 6.93 7.36 4.28*   HEMOGLOBIN g/dL 11.8 12.8 11.2*   PLATELETS Thousands/uL 186 171 161     Results from last 7 days   Lab Units 01/25/24  2120 01/25/24  1732   BLOOD CULTURE   --  No Growth at 72 hrs.  No Growth at 72 hrs.   SPUTUM CULTURE  Few Colonies of Stenotrophomonas maltophilia*  2+ Growth of  --    GRAM STAIN RESULT  Rare Epithelial cells per low power field  No Polys or Bacteria seen  --        Imaging Studies:   I have personally reviewed pertinent imaging study reports and images in PACS.    EKG, Pathology, and Other Studies:   I have personally reviewed pertinent reports.

## 2024-01-29 NOTE — PROGRESS NOTES
[facility]  Progress Note: Critical Care  Name: Carla Hunt 58 y.o. female I MRN: [id number]  Unit/Bed#: [facility] 207-01 I Date of Admission: 1/10/2024   Date of Service: 1/29/2024 I Hospital Day: 19    Assessment/Plan   Neuro:   Diagnosis: seizure disorder  S/p partial left temporal lobe resection in 2007  Contuinue home lamictal 150 bid and topamax 100 bid  Diagnosis: anxiety  Continue home effexor  Seroquel 25 hs and melatonin 6 hs for sleep     CV:   No active issues     Pulm:  Diagnosis: acute hypoxic respiratory failure   Tested COVID positive on 1/7  Completed severe COVID pathway and 7 days CTX  Initially doing well with decreasing O2 requirements after transfer out of ICU, but pulm consulted 1/26 for increasing O2 requirements as of 1/24  Steroids restarted for taper due to abrupt cessation upon completion of COVID pathway   Morning of 1/28 pt requiring HFNC 100% so was upgraded to SD1 under critical care, most recently on HFNC 55L 70% FiO2   Continue to wean supplemental O2 as tolerated for SpO2 >90%  Continue prednisone 60mg daily for now, taper TBD based on clinical course  See remainder of plan under ID     GI:   No active issues  Bowel regimen with daily miralax and bid senna      :   Diagnosis: CKD III  Baseline Cr appears to be 0.8-1.2  ELIESER on arrival but now resolved   Continue to monitor I/O and UOP     F/E/N:   F: none   E: monitor and replete for goal K>4, P>3, Mg>2  N: regular house      Heme/Onc:   Diagnosis: B cell lymphoma  Follows with heme/onc at [facility]  On rituxan for 2 year course, started May 2022  Treatment currently on hold   Leukopenic on admission but WBC now wnl  DVT ppx with lovenox      Endo:   Diagnosis: steroid hyperglycemia   SSI, hypoglycemic protocol  Goal -180     ID:   Diagnosis: COVID pneumonia  Completed severe COVID pathway with decadron (ended 1/22) and remdesivir (ended 1/20), also completed 7 days CXT on 1/15  C/f  opportunistic infections given immunocompromised status on chemotherapy and recent steroid use  Prednisone 60mg daily restarted on 1/27  Given 2 days cefepime 1/26 and 1/27, ID consulted and recommended d/c abx   Abx reinitiated with minocycline 200 bid and bactrim 800-160 bid when admitted to CC due to acuity of condition   ID following, appreciate recommendations      MSK/Skin:   No active issues    Disposition: Stepdown Level 1    ICU Core Measures     A: Assess, Prevent, and Manage Pain Has pain been assessed? Yes  Need for changes to pain regimen? No   B: Both SAT/SAT  N/A   C: Choice of Sedation RASS Goal: 0 Alert and Calm  Need for changes to sedation or analgesia regimen? No   D: Delirium CAM-ICU: Negative   E: Early Mobility  Plan for early mobility? Yes   F: Family Engagement Plan for family engagement today? Yes       Antibiotic Review: Continue broad spectrum secondary to severity of illness.     Review of Invasive Devices:            Prophylaxis:  VTE VTE covered by:  enoxaparin, Subcutaneous, 40 mg at 01/28/24 0838       Stress Ulcer  not ordered        Significant 24hr Events     24hr events: Night team was notified of soft BP overnight but no pressor requirements. Also added nystatin for oral thrush. Today pt is doing well, states her breathing feels better. She has mild headache but no other complaints.      Subjective     Review of Systems   Respiratory:  Negative for shortness of breath.    Cardiovascular:  Negative for chest pain.   Gastrointestinal:  Negative for abdominal pain, nausea and vomiting.   Neurological:  Positive for headaches.        Objective                            Vitals I/O      Most Recent Min/Max in 24hrs   Temp 98.3 °F (36.8 °C) Temp  Min: 97.6 °F (36.4 °C)  Max: 98.3 °F (36.8 °C)   Pulse 76 Pulse  Min: 70  Max: 96   Resp 22 Resp  Min: 20  Max: 24   BP 95/68 BP  Min: 87/55  Max: 115/73   O2 Sat 95 % SpO2  Min: 90 %  Max: 98 %      Intake/Output Summary (Last 24 hours) at  1/29/2024 0812  Last data filed at 1/28/2024 1820  Gross per 24 hour   Intake 240 ml   Output --   Net 240 ml       Diet Regular; Regular House    Invasive Monitoring   None         Physical Exam   Physical Exam  Eyes:      General:         Right eye: No discharge.         Left eye: No discharge.      Conjunctiva/sclera: Conjunctivae normal.   Skin:     General: Skin is warm and dry.   HENT:      Head: Normocephalic and atraumatic.      Right Ear: No drainage.      Left Ear: No drainage.      Nose: No congestion.      Mouth/Throat:      Mouth: Mucous membranes are moist.   Cardiovascular:      Rate and Rhythm: Normal rate and regular rhythm.      Heart sounds: No murmur heard.  Musculoskeletal:      Right lower leg: No edema.      Left lower leg: No edema.   Abdominal: General: Bowel sounds are normal. There is no distension.      Palpations: Abdomen is soft.      Tenderness: There is no abdominal tenderness. There is no guarding.   Constitutional:       General: She is not in acute distress.     Interventions: She is not sedated, not intubated and not restrained.  Pulmonary:      Effort: Pulmonary effort is normal. No respiratory distress. She is not intubated.      Breath sounds: Rales (bilateral bases) present. No wheezing or rhonchi.   Neurological:      Mental Status: She is alert and oriented to person, place and time. Mental status is at baseline. She is calm.   Genitourinary/Anorectal:  external catheter present.          Diagnostic Studies      EKG: no new  Imaging: CXR persistent extensive patchy bilateral GGO and consolidation I have personally reviewed pertinent reports.   and I have personally reviewed pertinent films in PACS     Medications:  Scheduled PRN   chlorhexidine, 15 mL, Q12H SARTHAK  enoxaparin, 40 mg, Daily  insulin lispro, 2-12 Units, TID With Meals  insulin lispro, 2-12 Units, HS  lamoTRIgine, 150 mg, BID  melatonin, 6 mg, HS  minocycline, 200 mg, Q12H SARTHAK  nystatin, 500,000 Units, 4x  Daily  polyethylene glycol, 17 g, Daily  predniSONE, 60 mg, Daily  QUEtiapine, 25 mg, HS  senna-docusate sodium, 2 tablet, BID  sulfamethoxazole-trimethoprim, 2 tablet, Q12H SARTHAK  topiramate, 100 mg, BID  venlafaxine, 25 mg, TID With Meals      acetaminophen, 650 mg, Q6H PRN  bisacodyl, 10 mg, Daily PRN  metoprolol, 2.5 mg, Q6H PRN  ondansetron, 4 mg, Q6H PRN       Continuous          Labs:    CBC    Recent Labs     01/28/24  0555 01/29/24  0612   WBC 7.36 6.93   HGB 12.8 11.8   HCT 41.5 36.5    186   BANDSPCT 25*  --      BMP    Recent Labs     01/28/24  0759 01/29/24  0612   SODIUM 140 139   K 4.2 3.9    106   CO2 25 26   AGAP 9 7   BUN 27* 34*   CREATININE 1.17 1.26   CALCIUM 9.7 9.9       Coags    No recent results     Additional Electrolytes  Recent Labs     01/28/24  0759 01/29/24  0612   MG 2.1 2.2   PHOS  --  3.0          Blood Gas    No recent results  Recent Labs     01/27/24  1228   PHVEN 7.333   [id number] 48.0   PO2VEN 46.4*   [id number] 24.9   BEVEN -1.3   [id number] 77.0    LFTs  No recent results    Infectious  Recent Labs     01/28/24  0759   PROCALCITONI 0.12     Glucose  Recent Labs     01/28/24  0759 01/29/24  0612   GLUC 132 79                   Risa Handley MD

## 2024-01-29 NOTE — RESPIRATORY THERAPY NOTE
01/29/24 0750   Respiratory Assessment   Assessment Type Assess only   L Breath Sounds Clear   Resp Comments No rx needed at this time. BS clear

## 2024-01-30 LAB
ANION GAP SERPL CALCULATED.3IONS-SCNC: 8 MMOL/L
BACTERIA BLD CULT: NORMAL
BACTERIA BLD CULT: NORMAL
BUN SERPL-MCNC: 35 MG/DL (ref 5–25)
CALCIUM SERPL-MCNC: 9.8 MG/DL (ref 8.4–10.2)
CHLORIDE SERPL-SCNC: 108 MMOL/L (ref 96–108)
CO2 SERPL-SCNC: 24 MMOL/L (ref 21–32)
CREAT SERPL-MCNC: 1.34 MG/DL (ref 0.6–1.3)
ERYTHROCYTE [DISTWIDTH] IN BLOOD BY AUTOMATED COUNT: 18 % (ref 11.6–15.1)
GFR SERPL CREATININE-BSD FRML MDRD: 44 ML/MIN/1.73SQ M
GLUCOSE SERPL-MCNC: 169 MG/DL (ref 65–140)
GLUCOSE SERPL-MCNC: 208 MG/DL (ref 65–140)
GLUCOSE SERPL-MCNC: 64 MG/DL (ref 65–140)
GLUCOSE SERPL-MCNC: 74 MG/DL (ref 65–140)
GLUCOSE SERPL-MCNC: 75 MG/DL (ref 65–140)
GLUCOSE SERPL-MCNC: 84 MG/DL (ref 65–140)
HCT VFR BLD AUTO: 36.3 % (ref 34.8–46.1)
HGB BLD-MCNC: 11.8 G/DL (ref 11.5–15.4)
MAGNESIUM SERPL-MCNC: 2.2 MG/DL (ref 1.9–2.7)
MCH RBC QN AUTO: 30 PG (ref 26.8–34.3)
MCHC RBC AUTO-ENTMCNC: 32.5 G/DL (ref 31.4–37.4)
MCV RBC AUTO: 92 FL (ref 82–98)
PHOSPHATE SERPL-MCNC: 3 MG/DL (ref 2.7–4.5)
PLATELET # BLD AUTO: 188 THOUSANDS/UL (ref 149–390)
PMV BLD AUTO: 11.2 FL (ref 8.9–12.7)
POTASSIUM SERPL-SCNC: 4.4 MMOL/L (ref 3.5–5.3)
RBC # BLD AUTO: 3.93 MILLION/UL (ref 3.81–5.12)
SODIUM SERPL-SCNC: 140 MMOL/L (ref 135–147)
WBC # BLD AUTO: 6.32 THOUSAND/UL (ref 4.31–10.16)

## 2024-01-30 PROCEDURE — 99232 SBSQ HOSP IP/OBS MODERATE 35: CPT | Performed by: INTERNAL MEDICINE

## 2024-01-30 PROCEDURE — 85027 COMPLETE CBC AUTOMATED: CPT

## 2024-01-30 PROCEDURE — 97535 SELF CARE MNGMENT TRAINING: CPT

## 2024-01-30 PROCEDURE — 97110 THERAPEUTIC EXERCISES: CPT

## 2024-01-30 PROCEDURE — 82948 REAGENT STRIP/BLOOD GLUCOSE: CPT

## 2024-01-30 PROCEDURE — NC001 PR NO CHARGE: Performed by: INTERNAL MEDICINE

## 2024-01-30 PROCEDURE — 94760 N-INVAS EAR/PLS OXIMETRY 1: CPT

## 2024-01-30 PROCEDURE — 94664 DEMO&/EVAL PT USE INHALER: CPT

## 2024-01-30 PROCEDURE — 83735 ASSAY OF MAGNESIUM: CPT

## 2024-01-30 PROCEDURE — 94760 N-INVAS EAR/PLS OXIMETRY 1: CPT | Performed by: SOCIAL WORKER

## 2024-01-30 PROCEDURE — 97168 OT RE-EVAL EST PLAN CARE: CPT

## 2024-01-30 PROCEDURE — 84100 ASSAY OF PHOSPHORUS: CPT

## 2024-01-30 PROCEDURE — 99233 SBSQ HOSP IP/OBS HIGH 50: CPT | Performed by: INTERNAL MEDICINE

## 2024-01-30 PROCEDURE — 97530 THERAPEUTIC ACTIVITIES: CPT

## 2024-01-30 PROCEDURE — 80048 BASIC METABOLIC PNL TOTAL CA: CPT

## 2024-01-30 RX ORDER — PREDNISONE 20 MG/1
20 TABLET ORAL DAILY
Status: DISCONTINUED | OUTPATIENT
Start: 2024-02-24 | End: 2024-02-02

## 2024-01-30 RX ORDER — PREDNISONE 10 MG/1
10 TABLET ORAL DAILY
Status: DISCONTINUED | OUTPATIENT
Start: 2024-03-02 | End: 2024-02-02

## 2024-01-30 RX ORDER — PREDNISONE 20 MG/1
60 TABLET ORAL DAILY
Status: COMPLETED | OUTPATIENT
Start: 2024-01-31 | End: 2024-02-02

## 2024-01-30 RX ORDER — PREDNISONE 20 MG/1
40 TABLET ORAL DAILY
Status: DISCONTINUED | OUTPATIENT
Start: 2024-02-10 | End: 2024-02-02

## 2024-01-30 RX ORDER — PREDNISONE 5 MG/1
5 TABLET ORAL DAILY
Status: DISCONTINUED | OUTPATIENT
Start: 2024-03-09 | End: 2024-02-02

## 2024-01-30 RX ADMIN — VENLAFAXINE 25 MG: 25 TABLET ORAL at 12:48

## 2024-01-30 RX ADMIN — PREDNISONE 60 MG: 20 TABLET ORAL at 09:45

## 2024-01-30 RX ADMIN — MELATONIN 6 MG: at 20:40

## 2024-01-30 RX ADMIN — CHLORHEXIDINE GLUCONATE 15 ML: 1.2 SOLUTION ORAL at 09:45

## 2024-01-30 RX ADMIN — ENOXAPARIN SODIUM 40 MG: 40 INJECTION SUBCUTANEOUS at 09:46

## 2024-01-30 RX ADMIN — MINOCYCLINE HYDROCHLORIDE 200 MG: 100 CAPSULE ORAL at 09:55

## 2024-01-30 RX ADMIN — LAMOTRIGINE 150 MG: 100 TABLET ORAL at 20:41

## 2024-01-30 RX ADMIN — INSULIN LISPRO 2 UNITS: 100 INJECTION, SOLUTION INTRAVENOUS; SUBCUTANEOUS at 17:55

## 2024-01-30 RX ADMIN — LAMOTRIGINE 150 MG: 100 TABLET ORAL at 12:48

## 2024-01-30 RX ADMIN — SULFAMETHOXAZOLE AND TRIMETHOPRIM 2 TABLET: 800; 160 TABLET ORAL at 20:40

## 2024-01-30 RX ADMIN — TOPIRAMATE 100 MG: 100 TABLET, FILM COATED ORAL at 09:56

## 2024-01-30 RX ADMIN — VENLAFAXINE 25 MG: 25 TABLET ORAL at 16:42

## 2024-01-30 RX ADMIN — NYSTATIN 500000 UNITS: 100000 SUSPENSION ORAL at 16:40

## 2024-01-30 RX ADMIN — NYSTATIN 500000 UNITS: 100000 SUSPENSION ORAL at 06:17

## 2024-01-30 RX ADMIN — SULFAMETHOXAZOLE AND TRIMETHOPRIM 2 TABLET: 800; 160 TABLET ORAL at 09:46

## 2024-01-30 RX ADMIN — ACETAMINOPHEN 650 MG: 325 TABLET, FILM COATED ORAL at 14:12

## 2024-01-30 RX ADMIN — NYSTATIN 500000 UNITS: 100000 SUSPENSION ORAL at 13:00

## 2024-01-30 RX ADMIN — MINOCYCLINE HYDROCHLORIDE 200 MG: 100 CAPSULE ORAL at 20:41

## 2024-01-30 RX ADMIN — TOPIRAMATE 100 MG: 100 TABLET, FILM COATED ORAL at 16:42

## 2024-01-30 RX ADMIN — SENNOSIDES, DOCUSATE SODIUM 2 TABLET: 8.6; 5 TABLET ORAL at 09:46

## 2024-01-30 RX ADMIN — NYSTATIN 500000 UNITS: 100000 SUSPENSION ORAL at 09:46

## 2024-01-30 RX ADMIN — QUETIAPINE 25 MG: 25 TABLET ORAL at 21:04

## 2024-01-30 RX ADMIN — VENLAFAXINE 25 MG: 25 TABLET ORAL at 09:55

## 2024-01-30 NOTE — PROGRESS NOTES
Progress Note - Infectious Disease   Carla Hunt 58 y.o. female MRN: [id number]  Unit/Bed#: [facility] 207-01 Encounter: [id number]      Impression/Recommendations:  Acute hypoxic respiratory failure, present on admission, secondary to mostly COVID but there was concern for concurrent bacterial pneumonia on admission due to elevated procalcitonin.  Therefore, patient completed treatment course for both COVID and bacterial pneumonia.  She was clinically improved with decreasing O2 requirement.  However, patient has developed worsening hypoxia over the last few days, acutely worse overnight, currently on high flow O2 support.  Chest CT more suggestive of COVID and postviral bacterial pneumonia.  Procalcitonin x 3 have been in the normal range.  Patient has no fever or leukocytosis.  She has minimal cough.  I suspect that her worsening hypoxia is still all secondary to inflammation from recent COVID.  Systemic corticosteroid was restarted.  Respiratory status is improved, most likely secondary to steroid restart.  Patient was started on Bactrim/doxycycline for stenotrophomonas in respiratory culture.  Given multiple normal procalcitonin, doubt pneumonia.  Consider tracheobronchitis..  Continue Bactrim/doxycycline per primary service.    Continue systemic corticosteroid.  Monitor temperature/WBC.  Monitor respiratory symptoms  Monitor O2 saturation and requirement.     Severe COVID, present on admission.  Patient was treated with remdesivir, dexamethasone and was given 1 dose of Tocilizumab.  Given worsening hypoxic respiratory failure and still significant inflammation on chest CT, patient may benefit from another course of systemic corticosteroid.  Patient had 2 negative COVID antigen tests after treatment.  She was started back on systemic corticosteroids yesterday.  Worsening hypoxia overnight noted, currently on high flow O2 support.  No additional antiviral needed.  Continue systemic corticosteroids.     CKD.   Creatinine at baseline.  Monitor creatinine.     Encephalopathy on admission.  This has resolved.  There was concern for possible seizure but no witnessed seizure and EEG did not capture it.  Monitor mental status.     Seizure disorder, status post temporal lobe resection in .     B-cell lymphoma, on chemotherapy, which is currently postponed.  Patient was leukopenic on admission but WBC now is near normal range.  Monitor WBC/ANC.     Discussed with patient in detail regarding the above plan.     Antibiotics:  Bactrim/doxycycline     Subjective:  Patient remains on high flow O2 supplement but feels better overall.  Dyspnea is improved.  Stable mild cough, mostly nonproductive.  Temperature stays down.  No chills.  No diarrhea.    Objective:  Vitals:  Temp:  [98.1 °F (36.7 °C)-98.5 °F (36.9 °C)] 98.5 °F (36.9 °C)  HR:  [] 86  BP: ()/(50-77) 116/77  SpO2:  [93 %-99 %] 95 %  Temp (24hrs), Av.3 °F (36.8 °C), Min:98.1 °F (36.7 °C), Max:98.5 °F (36.9 °C)  Current: Temperature: 98.5 °F (36.9 °C)    Physical Exam:     General: Awake, alert, cooperative, no distress.   Neck:  Supple. No mass.  No lymphadenopathy.   Lungs: Expansion symmetric, stable basilar rhonchi, no rales, no wheezing, respirations labored.   Heart:  Regular rate and rhythm, S1 and S2 normal, no murmur.   Abdomen: Soft, nondistended, non-tender, bowel sounds active all four quadrants, no masses, no organomegaly.   Extremities: No edema. No erythema/warmth. No ulcer. Nontender to palpation.   Skin:  No rash.   Neuro: Moves all extremities.     Invasive Devices       Peripheral Intravenous Line  Duration             Peripheral IV 24 Proximal;Right;Ventral (anterior) Forearm 1 day              Drain  Duration             External Urinary Catheter 1 day                    Labs studies:   I have personally reviewed pertinent labs.  Results from last 7 days   Lab Units 24  0630 24  0612 24  0759   POTASSIUM mmol/L 4.4  3.9 4.2   CHLORIDE mmol/L 108 106 106   CO2 mmol/L 24 26 25   BUN mg/dL 35* 34* 27*   CREATININE mg/dL 1.34* 1.26 1.17   EGFR ml/min/1.73sq m 44 47 51   CALCIUM mg/dL 9.8 9.9 9.7     Results from last 7 days   Lab Units 01/30/24  0630 01/29/24  0612 01/28/24  0555   WBC Thousand/uL 6.32 6.93 7.36   HEMOGLOBIN g/dL 11.8 11.8 12.8   PLATELETS Thousands/uL 188 186 171     Results from last 7 days   Lab Units 01/25/24  2120 01/25/24  1732   BLOOD CULTURE   --  No Growth After 4 Days.  No Growth After 4 Days.   SPUTUM CULTURE  Few Colonies of Stenotrophomonas maltophilia*  2+ Growth of  --    GRAM STAIN RESULT  Rare Epithelial cells per low power field  No Polys or Bacteria seen  --        Imaging Studies:   I have personally reviewed pertinent imaging study reports and images in PACS.    EKG, Pathology, and Other Studies:   I have personally reviewed pertinent reports.

## 2024-01-30 NOTE — PLAN OF CARE
Problem: OCCUPATIONAL THERAPY ADULT  Goal: Performs self-care activities at highest level of function for planned discharge setting.  See evaluation for individualized goals.  Description: Treatment Interventions: ADL retraining, Functional transfer training, UE strengthening/ROM, Endurance training, Patient/family training, Equipment evaluation/education, Compensatory technique education, Continued evaluation, Energy conservation, Activityengagement          See flowsheet documentation for full assessment, interventions and recommendations.   Note: Limitation: Decreased ADL status, Decreased Safe judgement during ADL, Decreased cognition, Decreased endurance, Decreased self-care trans, Decreased high-level ADLs  Prognosis: Good  Assessment: pt seen for re-evaluation today due to goals from IE  on 24. pt is now in step down critical care, on high flow O2. sats mid to low 90s while seated in chair. goals from IE remains appropriate and will be extended for 2 weeks.     Rehab Resource Intensity Level, OT: III (Minimum Resource Intensity)

## 2024-01-30 NOTE — PLAN OF CARE
Problem: PHYSICAL THERAPY ADULT  Goal: Performs mobility at highest level of function for planned discharge setting.  See evaluation for individualized goals.  Description:    Equipment Recommended:  (none)       See flowsheet documentation for full assessment, interventions and recommendations.  Outcome: Progressing  Note: Prognosis: Good  Problem List: Decreased endurance, Impaired balance, Decreased mobility  Assessment: Patient was received supine in bed. Patient was agreeable to therapy services today. PT session focused on exercises and transfers today in order to improve functional mobility and independence. Functional mobility was performed as described above.  Pt was eager to participate in therapy services today. Pt received on high flow via NC. Upon transferring to EOB, SpO2 desaturated to low 80's/high 70's. Pt was instructed in pursed lip breathing techniques and was able to minimally improve SpO2. RN increased O2 and pt SpO2 saturation increased to 95%. Pt as then led through several seated exercises at EOB for BLE strengthening and to reduce risk of muscle atrophy. Pt was then assisted to bedside recliner and made comfortable. Pt required increased education today about prognosis and her POC. Pt also educated about safety in hospital in standing.  Patient will benefit from continued PT services while in hospital in order to address remaining limitations. The patients AM-PAC Basic Mobility Inpatient Short From Raw Score is 22 .  Based on AM-PAC scoring and patient presentation, PT currently recommending Level III (Minimum Resource Intensity). Please also refer to the recommendation of the Physical Therapist for safe discharge planning.  Barriers to Discharge: None     Rehab Resource Intensity Level, PT: III (Minimum Resource Intensity)    See flowsheet documentation for full assessment.

## 2024-01-30 NOTE — OCCUPATIONAL THERAPY NOTE
"  Occupational Therapy Re-eval and Treatment Note      Carla Hunt    2024    Principal Problem:    Acute respiratory failure with hypoxia (HCC)  Active Problems:    Anxiety state    COVID    Stage 3b chronic kidney disease (CKD) (HCC)    Teto marginal zone B-cell lymphoma (HCC)    Seizure disorder (HCC)    Viral pneumonia      Past Medical History:   Diagnosis Date    Hx of hypercalcemia     Lymphoma (HCC) 2021    Parathyroid disease (HCC)     Seizures (HCC)     Situational depression     24 yr old son  from drug overdose       Past Surgical History:   Procedure Laterality Date    CRANIOTOMY FOR TEMPORAL LOBECTOMY Left 24 1058   OT Last Visit   OT Visit Date 24   Note Type   Note type Re-Evaluation   Pain Assessment   Pain Assessment Tool 0-10   Pain Score No Pain   Restrictions/Precautions   Weight Bearing Precautions Per Order No   Other Precautions Fall Risk;O2;Multiple lines;Cognitive  (pt currently on highflow O2)   Home Living   Type of Home House   Home Layout Two level;1/2 bath on main level   Bathroom Shower/Tub Walk-in shower   Additional Comments see IE for details re home set up   Prior Function   Level of [place] Independent with ADLs;Independent with functional mobility;Independent with IADLS   Lives With Family;Spouse;Daughter;Son   Receives Help From Family   IADLs Independent with driving;Independent with meal prep;Independent with medication management   Falls in the last 6 months 0   Vocational Volunteer work   Lifestyle   Autonomy I adls and mobility - i iadls - shares homemaking with family   Reciprocal Relationships supportive family   Service to Others volunteers at [employer] organization   Intrinsic Gratification sedentary   General   Additional Pertinent History pt moved from regular hospital floor to critical care step down on  due to respiratory issues, increased O2 requirements   Subjective   Subjective \"I am feeling pretty good today\"   ADL "   Eating Assistance 5  Supervision/Setup   Eating Deficit Setup;Increased time to complete   Grooming Assistance 5  Supervision/Setup   Grooming Deficit Setup;Increased time to complete;Wash/dry hands;Wash/dry face;Brushing hair   UB Bathing Assistance 5  Supervision/Setup   UB Bathing Deficit Increased time to complete;Chest;Right arm;Left arm;Perineal area;Abdomen   LB Bathing Assistance 4  Minimal Assistance   LB Bathing Deficit Increased time to complete   UB Dressing Assistance 4  Minimal Assistance   UB Dressing Deficit Increased time to complete   LB Dressing Assistance 4  Minimal Assistance   LB Dressing Deficit Increased time to complete;Don/doff R sock;Don/doff L sock   Transfers   Sit to Stand 5  Supervision   Stand to Sit 5  Supervision   Stand pivot 5  Supervision   Balance   Static Sitting Good   Dynamic Sitting Good   Static Standing Fair   Dynamic Standing Fair -   Activity Tolerance   Activity Tolerance Patient limited by fatigue;Treatment limited secondary to medical complications (Comment)  (pt on high flow O2. dropped to 79 % after standing x 2 min.)   Nurse Made Aware ok to see   RUE Assessment   RUE Assessment WFL   LUE Assessment   LUE Assessment WFL   Hand Function   Gross Motor Coordination Functional   Fine Motor Coordination Functional   Psychosocial   Psychosocial (WDL) WDL   Cognition   Overall Cognitive Status Impaired   Arousal/Participation Alert;Cooperative   Attention Within functional limits   Orientation Level Oriented X4   Memory Within functional limits   Following Commands Follows one step commands without difficulty   Assessment   Limitation Decreased ADL status;Decreased Safe judgement during ADL;Decreased cognition;Decreased endurance;Decreased self-care trans;Decreased high-level ADLs   Assessment pt seen for re-evaluation today due to goals from IE  on 24. pt is now in step down critical care, on high flow O2. sats mid to low 90s while seated in chair. goals  from IE remains appropriate and will be extended for 2 weeks.   Goals   Patient Goals to get better   Plan   Treatment Interventions ADL retraining;Functional transfer training;Endurance training;Cognitive reorientation;Patient/family training;Compensatory technique education;Energy conservation;Activityengagement;Equipment evaluation/education   Goal Expiration Date 02/13/24   OT Treatment Day 3   OT Frequency 3-5x/wk   Discharge Recommendation   Rehab Resource Intensity Level, OT III (Minimum Resource Intensity)   Equipment Recommended Shower/Tub chair with back ($)   AM-PAC Daily Activity Inpatient   Lower Body Dressing 3   Bathing 3   Toileting 3   Upper Body Dressing 3   Grooming 4   Eating 4   Daily Activity Raw Score 20   Daily Activity Standardized Score (Calc for Raw Score >=11) 42.03   Additional Treatment Session   Start Time 1042  (re-eval from 9836-7472)   End Time 1058   Treatment Assessment pt seen for OT treatment session w focus on ECT/self pacing skills, breathing skills. functional standing. and self care. pt w drop in O2 sats in stance. dropped to 79% on high flow O2. it took 3 min to get up to 90-92% w cues for breathing. pt ecucated on recommendations for shower seat when going home. pt pleasant and cooperative. OT will continue to follow as per POC   Additional Treatment Day 3     OT goals to be achieved in 2 weeks    1) Pt will complete UB TE to increase strength in order to improve overall functional abilities     2) Pt will increase activity tolerance to G for 30 min txment sessions   3) Pt will complete UB/LB self care w/ mod I using adaptive device and DME as needed    4) Pt will complete toileting w/ mod I w/ G hygiene/thoroughness using DME as needed    5) Pt will improve functional transfers to Mod I on/off all surfaces using DME as needed w/ G balance/safety     6) Pt will improve functional mobility during ADL/IADL/leisure tasks to Mod I using DME as needed w/ G balance/safety     7)  Pt will participate in simulated IADL management task with DME as needed to increase independence to  w/ G safety and endurance    8) Pt will independently identify and utilize 2-3 coping strategies to increase positive affect and promote overall well-being.    9) Pt will demonstrate 100% carryover of Spinal precautions and  energy conservation techniques t/o functional I/ADL/leisure tasks w/o cues s/p skilled education 10) Pt will engage in ongoing cognitive assessment w/ G participation to assist w/ safe d/c planning/recommendations

## 2024-01-30 NOTE — UTILIZATION REVIEW
Continued Stay Review    Date: 1/30/24                          Current Patient Class: IP  Current Level of Care: Level 1 Stepdown    HPI:58 y.o. female initially admitted on 1/10     Assessment/Plan: continue home Lamictal, Effexor, Seroquel, continue prednisone 60 mg daily-taper to be determined, continue HFNC and wean as toelrated, creatinine uptrending-continue to monitor, obtain UA, continue SSI, continue 5 day course of minocycline and bactrim.     Vital Signs:     Date/Time Temp Pulse Resp BP MAP (mmHg) SpO2 FiO2 (%) Calculated FIO2 (%) - Nasal Cannula O2 Flow Rate (L/min) Nasal Cannula O2 Flow Rate (L/min) O2 Device O2 Interface Device   01/30/24 0845 -- -- -- -- -- 94 % -- -- -- -- -- HFNC prongs   01/30/24 0815 98.3 °F (36.8 °C) 86 -- 100/63 74 93 % -- -- -- -- -- --   01/30/24 0347 -- -- -- -- -- 95 % -- -- -- -- -- HFNC prongs   01/30/24 0253 -- 80 -- 79/50 Abnormal  59 Abnormal  93 % -- -- -- -- -- --   01/29/24 2300 -- -- -- -- -- 94 % 55 200 45 L/min 45 L/min High flow nasal cannula --   01/29/24 2235 98.3 °F (36.8 °C) 88 -- 96/63 73 95 % -- -- -- -- -- --   01/29/24 2058 -- -- -- -- -- 94 % 65 -- 50 L/min -- High flow nasal cannula HFNC prongs   01/29/24 1950 98.3 °F (36.8 °C) 88 -- 99/64 84 96 % -- -- -- -- -- --   01/29/24 1645 98.1 °F (36.7 °C) -- -- -- -- -- -- -- -- -- -- --   01/29/24 1644 -- 88 -- 97/68 78 94 % 70 -- 55 L/min -- High flow nasal cannula HFNC prongs   01/29/24 1600 -- -- -- -- -- -- 70 240 -- 55 L/min High flow nasal cannula --       Pertinent Labs/Diagnostic Results:       Results from last 7 days   Lab Units 01/30/24  0630 01/29/24  0612 01/28/24  0555 01/26/24  0454 01/25/24  1739   WBC Thousand/uL 6.32 6.93 7.36 4.28* 5.65   HEMOGLOBIN g/dL 11.8 11.8 12.8 11.2* 12.4   HEMATOCRIT % 36.3 36.5 41.5 34.7* 39.0   PLATELETS Thousands/uL 188 186 171 161 189   BANDS PCT %  --   --  25*  --  13*         Results from last 7 days   Lab Units 01/30/24  0630 01/29/24  0612 01/28/24  0759  01/26/24  0454 01/25/24  1739   SODIUM mmol/L 140 139 140 140 136   POTASSIUM mmol/L 4.4 3.9 4.2 4.2 3.8   CHLORIDE mmol/L 108 106 106 111* 103   CO2 mmol/L 24 26 25 26 27   ANION GAP mmol/L 8 7 9 3 6   BUN mg/dL 35* 34* 27* 24 35*   CREATININE mg/dL 1.34* 1.26 1.17 1.04 1.14   EGFR ml/min/1.73sq m 44 47 51 59 53   CALCIUM mg/dL 9.8 9.9 9.7 8.7 9.3   MAGNESIUM mg/dL 2.2 2.2 2.1  --   --    PHOSPHORUS mg/dL 3.0 3.0  --   --   --          Results from last 7 days   Lab Units 01/30/24  0846 01/30/24  0823 01/29/24  2118 01/29/24  1643 01/29/24  1124 01/29/24  0752 01/28/24  2138 01/28/24  1602 01/28/24  1136 01/28/24  0804 01/27/24  2053 01/27/24  1548   POC GLUCOSE mg/dl 74 64* 249* 134 132 83 165* 252* 191* 135 161* 170*     Results from last 7 days   Lab Units 01/30/24  0630 01/29/24  0612 01/28/24  0759 01/26/24  0454 01/25/24  1739   GLUCOSE RANDOM mg/dL 75 79 132 83 112           Results from last 7 days   Lab Units 01/27/24  1228   PH NICA  7.333   PCO2 NICA mm Hg 48.0   PO2 NICA mm Hg 46.4*   HCO3 NICA mmol/L 24.9   BASE EXC NICA mmol/L -1.3   O2 CONTENT NICA ml/dL 12.9   O2 HGB, VENOUS % 77.0           Results from last 7 days   Lab Units 01/28/24  0759 01/27/24  0510 01/25/24  1739   PROCALCITONIN ng/ml 0.12 0.17 0.17           Results from last 7 days   Lab Units 01/26/24  0454   BNP pg/mL 12           Results from last 7 days   Lab Units 01/25/24  2120 01/25/24  1732   BLOOD CULTURE   --  No Growth After 4 Days.  No Growth After 4 Days.   SPUTUM CULTURE  Few Colonies of Stenotrophomonas maltophilia*  2+ Growth of  --    GRAM STAIN RESULT  Rare Epithelial cells per low power field  No Polys or Bacteria seen  --          Medications:   Scheduled Medications:  chlorhexidine, 15 mL, Mouth/Throat, Q12H SARTHAK  enoxaparin, 40 mg, Subcutaneous, Daily  insulin lispro, 2-12 Units, Subcutaneous, TID With Meals  insulin lispro, 2-12 Units, Subcutaneous, HS  lamoTRIgine, 150 mg, Oral, BID  melatonin, 6 mg, Oral,  HS  minocycline, 200 mg, Oral, Q12H SARTHAK  nystatin, 500,000 Units, Swish & Spit, 4x Daily  polyethylene glycol, 17 g, Oral, Daily  predniSONE, 60 mg, Oral, Daily  QUEtiapine, 25 mg, Oral, HS  senna-docusate sodium, 2 tablet, Oral, BID  sulfamethoxazole-trimethoprim, 2 tablet, Oral, Q12H SARTHAK  topiramate, 100 mg, Oral, BID  venlafaxine, 25 mg, Oral, TID With Meals      Continuous IV Infusions:     PRN Meds:  acetaminophen, 650 mg, Oral, Q6H PRN  bisacodyl, 10 mg, Rectal, Daily PRN  metoprolol, 2.5 mg, Intravenous, Q6H PRN  ondansetron, 4 mg, Intravenous, Q6H PRN        Discharge Plan: D    Network Utilization Review Department  ATTENTION: Please call with any questions or concerns to [phone] and carefully listen to the prompts so that you are directed to the right person. All voicemails are confidential.   For Discharge needs, contact Care Management DC Support Team at [phone] opt. 2  Send all requests for admission clinical reviews, approved or denied determinations and any other requests to dedicated fax number below belonging to the campus where the patient is receiving treatment. List of dedicated fax numbers for the Facilities:  FACILITY NAME UR FAX NUMBER   ADMISSION DENIALS (Administrative/Medical Necessity) [phone]   DISCHARGE SUPPORT TEAM (NETWORK) [phone]   PARENT CHILD HEALTH (Maternity/NICU/Pediatrics) [phone]   [facility] [phone]   [facility] [phone]   [facility] [phone]   [facility] [phone]   [facility] [phone]   [facility] [phone]   [facility] [phone]   [facility] [phone]   [facility] [phone]   [facility] [phone]   [facility]  [phone]   [facility] [phone]

## 2024-01-30 NOTE — PROGRESS NOTES
Critical Care Interval Transfer Note:    Summary of ICU course:  57 yo female, PMH epilepsy, anxiety, CKD 3, B cell lymphoma on chemo. She initially presented to [place] on 1/8 with AMS, weakness, and poor appetite. She had previously been treated for UTI outpatient and completed course of ciprofloxacin. She was found to be COVID positive on admission. Presenting symptoms were concerning for stroke, CTH revealed possible SAH but no intervention was required per neurosurgery. There was also possible lacunar infarct. Routine EEG was negative. Despite treatment condition continued to worsen. LP was performed and she was started on empiric abx for meningitis. She was transferred to [facility] for vEEG on 1/10. LP studies appeared benign. Video EEG did not reveal any seizure activity. She was treated on severe COVID pathway initially requiring HFNC and given abx with CTX. She was transferred out of the ICU on 1/16 and at that time was gradually weaning down from MFNC. Pulmonology was consulted on 1/26 for possible bronchoscopy due to newly increasing O2 requirements since 1/24. CT chest revealed fibrosis and GGO likely from COVID along with RLL pna. ID has been following and did not recommend repeat course of abx as case appears consistent with post-viral rather than bacterial pna. Sputum cultures were unremarkable. LDH was checked given pt's immunocompromised status on chemo and was mildly elevated at 326. Steroids were reinitiated to complete taper as they had previously been abruptly d/c's upon completion of COVID pathway. Upon tranfer to ICU pt was on HFNC 55L 85%. She continued on abx and steroids and improved with decreasing O2 requirements. Most likely etiology at this time seems to be pneumonitis possibly from Rituxan treatment which fits with improvement with reinitiation of steroids. Will plan for slow taper of prednisone by 10mg weekly. Will have pulm consult service continue to see pt. Also of note her Cr has trended  up the last 2 days, she appears euvolemic and has made good urine. We sent for UA to evaluate for interstitial nephritis as timing correlates with starting Bactrim, follow up results and continue to trend Cr. If UA unrevealing for etiology would try hydration.     Please refer to progress note from earlier today for full details.     Barriers to discharge:   Wean O2 requirements      Consults: IP CONSULT TO CASE MANAGEMENT  IP CONSULT TO PHARMACY  IP CONSULT TO INFECTIOUS DISEASES  IP CONSULT TO PICC TEAM  IP CONSULT TO PULMONOLOGY  IP CONSULT TO INFECTIOUS DISEASES  IP CONSULT TO CASE MANAGEMENT    Recommended to review admission imaging for incidental findings and document in discharge navigator: Chart reviewed, no known incidental findings noted at this time.      Discharge Plan: Anticipate discharge in 48-72 hrs to discharge location to be determined pending rehab evaluations.        Patient seen and evaluated by Critical Care today and deemed to be appropriate for transfer to Med Surg. [facility]. Critical care can be contacted via [employer] with any questions or concerns.

## 2024-01-30 NOTE — RESPIRATORY THERAPY NOTE
Resp care   01/30/24 1423   Respiratory Assessment   Resp Comments sat 9& on 80%/50lpm. decreased 02 to 60   Non-Invasive Information   O2 Interface Device HFNC prongs   Non-Invasive Ventilation Mode HFNC (High flow)   SpO2 97 %   $ Pulse Oximetry Spot Check Charge Completed   Non-Invasive Settings   Flow (lpm) 50   FiO2 (%) 60   Temperature (Set) 31   Non-Invasive Readings   Heater Temperature (Obs) 31

## 2024-01-30 NOTE — PHYSICAL THERAPY NOTE
PHYSICAL THERAPY NOTE          Patient Name: Carla Hunt  Today's Date: 1/30/2024 01/30/24 0845   PT Last Visit   PT Visit Date 01/30/24   Note Type   Note Type Treatment   Pain Assessment   Pain Assessment Tool 0-10   Pain Score No Pain   Restrictions/Precautions   Weight Bearing Precautions Per Order No   Other Precautions O2;Telemetry;Multiple lines;Fall Risk;Chair Alarm;Bed Alarm  (pt on high flow)   General   Chart Reviewed Yes   Response to Previous Treatment Patient with no complaints from previous session.   Family/Caregiver Present No   Cognition   Overall Cognitive Status WFL   Arousal/Participation Alert;Responsive;Cooperative   Attention Within functional limits   Orientation Level Oriented X4   Memory Within functional limits   Following Commands Follows one step commands without difficulty   Subjective   Subjective pt pleasant and cooperative throughout therapy session. pt received supine in bed   Bed Mobility   Supine to Sit 5  Supervision   Additional items Increased time required;Verbal cues   Sit to Supine Unable to assess   Transfers   Sit to Stand 5  Supervision   Additional items Increased time required;Verbal cues   Stand to Sit 5  Supervision   Additional items Increased time required;Verbal cues   Additional Comments transfers w/o AD   Ambulation/Elevation   Gait pattern Improper Weight shift;Narrow HEATHER;Decreased foot clearance;Shuffling;Short stride   Gait Assistance 5  Supervision   Additional items Verbal cues   Assistive Device None   Distance 3' to bedside recliner   Stair Management Assistance Not tested   Balance   Static Sitting Good   Dynamic Sitting Fair +   Static Standing Fair   Dynamic Standing Fair -   Ambulatory Fair -   Endurance Deficit   Endurance Deficit Yes   Endurance Deficit Description generalized weakness, decreased exercise tolerance, O2 desaturation with activity   Activity  Tolerance   Activity Tolerance Patient tolerated treatment well   Nurse Made Aware RN cleared and updated   Exercises   Hip Flexion Sitting;15 reps;AROM;Bilateral   Knee AROM Long Arc Quad Sitting;15 reps;AROM;Bilateral   Ankle Pumps Sitting;15 reps;AROM;Bilateral   Assessment   Prognosis Good   Problem List Decreased endurance;Impaired balance;Decreased mobility   Assessment Patient was received supine in bed. Patient was agreeable to therapy services today. PT session focused on exercises and transfers today in order to improve functional mobility and independence. Functional mobility was performed as described above.  Pt was eager to participate in therapy services today. Pt received on high flow via NC. Upon transferring to EOB, SpO2 desaturated to low 80's/high 70's. Pt was instructed in pursed lip breathing techniques and was able to minimally improve SpO2. RN increased O2 and pt SpO2 saturation increased to 95%. Pt as then led through several seated exercises at EOB for BLE strengthening and to reduce risk of muscle atrophy. Pt was then assisted to bedside recliner and made comfortable. Pt required increased education today about prognosis and her POC. Pt also educated about safety in hospital in standing.  Patient will benefit from continued PT services while in hospital in order to address remaining limitations. The patients AM-PAC Basic Mobility Inpatient Short From Raw Score is 22 .  Based on AM-PAC scoring and patient presentation, PT currently recommending Level III (Minimum Resource Intensity). Please also refer to the recommendation of the Physical Therapist for safe discharge planning.   Barriers to Discharge None   Goals   Patient Goals to go home   STG Expiration Date 02/13/24   Short Term Goal #1 goals remain appropriate, goals extended;  pt will: 1) perform bed mobility with mod I to promote functional independence. 2) perform transfers to<>from all surfaces with mod I to promote safety 3)  ambulate 200' with stable vitals and mod I with least restrictive device to promote safe return to home and community 4) negotiate 13 stairs with mod I and stable vitals to promote safe return to home. 5) improve balance grades by 1/2 to promote safety with functional mobility. 6) improve strength grades by 1/2 to promote safety with functional mobility. 7) demonstrate ability to safely manage 02 lines and self assess SPO2 to decrease fall risk   PT Treatment Day 5   Plan   Treatment/Interventions ADL retraining;Functional transfer training;LE strengthening/ROM;Elevations;Therapeutic exercise;Endurance training;Bed mobility;Gait training;Spoke to nursing;OT   Progress Progressing toward goals   PT Frequency 2-3x/wk   Discharge Recommendation   Rehab Resource Intensity Level, PT III (Minimum Resource Intensity)   AM-PAC Basic Mobility Inpatient   Turning in Flat Bed Without Bedrails 4   Lying on Back to Sitting on Edge of Flat Bed Without Bedrails 4   Moving Bed to Chair 4   Standing Up From Chair Using Arms 4   Walk in Room 3   Climb 3-5 Stairs With Railing 3   Basic Mobility Inpatient Raw Score 22   Basic Mobility Standardized Score 47.4   Highest Level Of Mobility   JH-HLM Goal 7: Walk 25 feet or more   JH-HLM Achieved 6: Walk 10 steps or more   Education   Education Provided Mobility training   Patient Demonstrates acceptance/verbal understanding   End of Consult   Patient Position at End of Consult Bedside chair;Bed/Chair alarm activated;All needs within reach       Osbaldo Walton PT, DPT

## 2024-01-30 NOTE — PROGRESS NOTES
[facility]  Progress Note: Critical Care  Name: Carla Hunt 58 y.o. female I MRN: [id number]  Unit/Bed#: [facility] 207-01 I Date of Admission: 1/10/2024   Date of Service: 1/30/2024 I Hospital Day: 20    Assessment/Plan   Neuro:   Diagnosis: seizure disorder  S/p partial left temporal lobe resection in 2007  Contuinue home lamictal 150 bid and topamax 100 bid  Diagnosis: anxiety  Continue home effexor  Seroquel 25 hs and melatonin 6 hs for sleep     CV:   No active issues     Pulm:  Diagnosis: acute hypoxic respiratory failure   Tested COVID positive on 1/7  Completed severe COVID pathway and 7 days CTX  Initially doing well with decreasing O2 requirements after transfer out of ICU, but pulm consulted 1/26 for increasing O2 requirements as of 1/24  Steroids restarted for taper due to abrupt cessation upon completion of COVID pathway   Morning of 1/28 pt requiring HFNC 100% so was upgraded to SD1 under critical care, most recently on HFNC 45L 55% FiO2   Imaging findings with diffuse GGO and multifocal consolidations as well as bronchial dilation - concern that this could represent fibrosis 2/2 rituxan, diffuse pattern not as consistent with post-COVID fibrosis   Continue to wean supplemental O2 as tolerated for SpO2 >90%  Continue prednisone 60mg daily for now, taper TBD based on clinical course  See remainder of plan under ID     GI:   No active issues  Bowel regimen with daily miralax and bid senna      :   Diagnosis: CKD III  Baseline Cr appears to be 0.8-1.2  ELIESER on arrival which resolved  Cr uptrending to 1.34 today - does not yet meet criteria for ELIESER, possibly due to Bactrim use vs changes in volume status (more likely dehydration - made significant urine yesterday and net negative, has some rales on exam but no other signs of volume overload)  Will obtain UA to evaluate for eosinophils to help delineate cause   Continue to monitor I/O and UOP     F/E/N:   F: none    E: monitor and replete for goal K>4, P>3, Mg>2  N: regular house      Heme/Onc:   Diagnosis: B cell lymphoma  Follows with heme[facility]onc at [facility]  On rituxan for 2 year course, started May 2022  Last dose was 12/20, treatment currently on hold   Leukopenic on admission but WBC now wnl  DVT ppx with lovenox      Endo:   Diagnosis: steroid hyperglycemia   SSI, hypoglycemic protocol  Goal -180     ID:   Diagnosis: COVID pneumonia vs tracheobronchitis  Completed severe COVID pathway with decadron (ended 1/22) and remdesivir (ended 1/20), also completed 7 days CXT on 1/15  C/f opportunistic infections given immunocompromised status on chemotherapy and recent steroid use  No consolidations on CXR and procal negative  Prednisone 60mg daily restarted on 1/27  Given 2 days cefepime 1/26 and 1/27, ID consulted and recommended d/c abx   Abx reinitiated with minocycline 200 bid and bactrim 800-160 bid when admitted to CC due to acuity of condition, will plan for 5 day course  ID following, appreciate recommendations      MSK/Skin:   No active issues    Disposition: Stepdown Level 1    ICU Core Measures     A: Assess, Prevent, and Manage Pain Has pain been assessed? Yes  Need for changes to pain regimen? No   B: Both SAT/SAT  N/A   C: Choice of Sedation RASS Goal: 0 Alert and Calm  Need for changes to sedation or analgesia regimen? No   D: Delirium CAM-ICU: Negative   E: Early Mobility  Plan for early mobility? Yes   F: Family Engagement Plan for family engagement today? Yes       Antibiotic Review: Patient on appropriate coverage based on culture data.     Review of Invasive Devices:            Prophylaxis:  VTE VTE covered by:  enoxaparin, Subcutaneous, 40 mg at 01/29/24 0927       Stress Ulcer  not ordered        Significant 24hr Events     24hr events: No events overnight. Pt resting comfortably in bed this morning. She states that her breathing feels improved. She does complain of some green sputum production which is  occasionally blood streaked.      Subjective     Review of Systems   Constitutional:  Negative for chills and fever.   Respiratory:  Negative for shortness of breath.         Green sputum production with occasional streaking with blood   Cardiovascular:  Negative for chest pain.   Gastrointestinal:  Negative for abdominal pain, nausea and vomiting.        Objective                            Vitals I/O      Most Recent Min/Max in 24hrs   Temp 98.3 °F (36.8 °C) Temp  Min: 98 °F (36.7 °C)  Max: 98.3 °F (36.8 °C)   Pulse 80 Pulse  Min: 80  Max: 100   Resp 22 No data recorded   BP (!) 79/50 BP  Min: 79/50  Max: 99/64   O2 Sat 95 % SpO2  Min: 93 %  Max: 98 %      Intake/Output Summary (Last 24 hours) at 1/30/2024 0736  Last data filed at 1/30/2024 0601  Gross per 24 hour   Intake 1080 ml   Output 2450 ml   Net -1370 ml       Diet Regular; Regular House    Invasive Monitoring   None         Physical Exam   Physical Exam  Eyes:      General:         Right eye: No discharge.         Left eye: No discharge.      Conjunctiva/sclera: Conjunctivae normal.   Skin:     General: Skin is warm and dry.   HENT:      Head: Normocephalic and atraumatic.      Right Ear: No drainage.      Left Ear: No drainage.      Nose: Epistaxis present. No congestion or nasogastric tube.      Mouth/Throat:      Mouth: Mucous membranes are moist.   Cardiovascular:      Rate and Rhythm: Normal rate and regular rhythm.      Heart sounds: Normal heart sounds.   Musculoskeletal:      Right lower leg: No edema.      Left lower leg: No edema.   Abdominal: General: Bowel sounds are normal. There is no distension.      Palpations: Abdomen is soft.      Tenderness: There is no abdominal tenderness. There is no guarding.   Constitutional:       General: She is not in acute distress.     Interventions: She is not sedated, not intubated and not restrained.  Pulmonary:      Effort: Pulmonary effort is normal. No respiratory distress. She is not intubated.       Breath sounds: Rales (bilateral bases) present. No wheezing or rhonchi.   Neurological:      Mental Status: She is alert and oriented to person, place and time. Mental status is at baseline. She is calm.            Diagnostic Studies      EKG: no new  Imaging: no new      Medications:  Scheduled PRN   chlorhexidine, 15 mL, Q12H SARTHAK  enoxaparin, 40 mg, Daily  insulin lispro, 2-12 Units, TID With Meals  insulin lispro, 2-12 Units, HS  lamoTRIgine, 150 mg, BID  melatonin, 6 mg, HS  minocycline, 200 mg, Q12H SARTHAK  nystatin, 500,000 Units, 4x Daily  polyethylene glycol, 17 g, Daily  predniSONE, 60 mg, Daily  QUEtiapine, 25 mg, HS  senna-docusate sodium, 2 tablet, BID  sulfamethoxazole-trimethoprim, 2 tablet, Q12H SARTHAK  topiramate, 100 mg, BID  venlafaxine, 25 mg, TID With Meals      acetaminophen, 650 mg, Q6H PRN  bisacodyl, 10 mg, Daily PRN  metoprolol, 2.5 mg, Q6H PRN  ondansetron, 4 mg, Q6H PRN       Continuous          Labs:    CBC    Recent Labs     01/29/24  0612 01/30/24  0630   WBC 6.93 6.32   HGB 11.8 11.8   HCT 36.5 36.3    188     BMP    Recent Labs     01/29/24  0612 01/30/24  0630   SODIUM 139 140   K 3.9 4.4    108   CO2 26 24   AGAP 7 8   BUN 34* 35*   CREATININE 1.26 1.34*   CALCIUM 9.9 9.8       Coags    No recent results     Additional Electrolytes  Recent Labs     01/29/24  0612 01/30/24  0630   MG 2.2 2.2   PHOS 3.0 3.0          Blood Gas    No recent results  No recent results LFTs  No recent results    Infectious  Recent Labs     01/28/24  0759   PROCALCITONI 0.12     Glucose  Recent Labs     01/28/24  0759 01/29/24  0612 01/30/24  0630   GLUC 132 79 75                   Risa Handley MD

## 2024-01-31 LAB
CRP SERPL QL: 5.6 MG/L
GLUCOSE SERPL-MCNC: 116 MG/DL (ref 65–140)
GLUCOSE SERPL-MCNC: 117 MG/DL (ref 65–140)
GLUCOSE SERPL-MCNC: 123 MG/DL (ref 65–140)
GLUCOSE SERPL-MCNC: 125 MG/DL (ref 65–140)
GLUCOSE SERPL-MCNC: 71 MG/DL (ref 65–140)

## 2024-01-31 PROCEDURE — 86140 C-REACTIVE PROTEIN: CPT | Performed by: STUDENT IN AN ORGANIZED HEALTH CARE EDUCATION/TRAINING PROGRAM

## 2024-01-31 PROCEDURE — 99233 SBSQ HOSP IP/OBS HIGH 50: CPT | Performed by: INTERNAL MEDICINE

## 2024-01-31 PROCEDURE — 99232 SBSQ HOSP IP/OBS MODERATE 35: CPT | Performed by: INTERNAL MEDICINE

## 2024-01-31 PROCEDURE — 94760 N-INVAS EAR/PLS OXIMETRY 1: CPT

## 2024-01-31 PROCEDURE — 97530 THERAPEUTIC ACTIVITIES: CPT

## 2024-01-31 PROCEDURE — 82948 REAGENT STRIP/BLOOD GLUCOSE: CPT

## 2024-01-31 PROCEDURE — 97110 THERAPEUTIC EXERCISES: CPT

## 2024-01-31 RX ADMIN — LAMOTRIGINE 150 MG: 100 TABLET ORAL at 22:18

## 2024-01-31 RX ADMIN — MELATONIN 6 MG: at 22:20

## 2024-01-31 RX ADMIN — SULFAMETHOXAZOLE AND TRIMETHOPRIM 2 TABLET: 800; 160 TABLET ORAL at 22:19

## 2024-01-31 RX ADMIN — TOPIRAMATE 100 MG: 100 TABLET, FILM COATED ORAL at 18:22

## 2024-01-31 RX ADMIN — VENLAFAXINE 25 MG: 25 TABLET ORAL at 08:03

## 2024-01-31 RX ADMIN — NYSTATIN 500000 UNITS: 100000 SUSPENSION ORAL at 10:49

## 2024-01-31 RX ADMIN — CHLORHEXIDINE GLUCONATE 15 ML: 1.2 SOLUTION ORAL at 22:19

## 2024-01-31 RX ADMIN — NYSTATIN 500000 UNITS: 100000 SUSPENSION ORAL at 05:36

## 2024-01-31 RX ADMIN — ACETAMINOPHEN 650 MG: 325 TABLET, FILM COATED ORAL at 15:50

## 2024-01-31 RX ADMIN — SENNOSIDES, DOCUSATE SODIUM 2 TABLET: 8.6; 5 TABLET ORAL at 18:21

## 2024-01-31 RX ADMIN — SULFAMETHOXAZOLE AND TRIMETHOPRIM 2 TABLET: 800; 160 TABLET ORAL at 08:02

## 2024-01-31 RX ADMIN — MINOCYCLINE HYDROCHLORIDE 200 MG: 100 CAPSULE ORAL at 08:03

## 2024-01-31 RX ADMIN — TOPIRAMATE 100 MG: 100 TABLET, FILM COATED ORAL at 08:05

## 2024-01-31 RX ADMIN — NYSTATIN 500000 UNITS: 100000 SUSPENSION ORAL at 18:22

## 2024-01-31 RX ADMIN — QUETIAPINE 25 MG: 25 TABLET ORAL at 22:19

## 2024-01-31 RX ADMIN — VENLAFAXINE 25 MG: 25 TABLET ORAL at 18:22

## 2024-01-31 RX ADMIN — LAMOTRIGINE 150 MG: 100 TABLET ORAL at 08:03

## 2024-01-31 RX ADMIN — PREDNISONE 60 MG: 20 TABLET ORAL at 08:02

## 2024-01-31 RX ADMIN — MINOCYCLINE HYDROCHLORIDE 200 MG: 100 CAPSULE ORAL at 22:17

## 2024-01-31 RX ADMIN — CHLORHEXIDINE GLUCONATE 15 ML: 1.2 SOLUTION ORAL at 08:02

## 2024-01-31 RX ADMIN — NYSTATIN 500000 UNITS: 100000 SUSPENSION ORAL at 13:40

## 2024-01-31 RX ADMIN — ENOXAPARIN SODIUM 40 MG: 40 INJECTION SUBCUTANEOUS at 08:02

## 2024-01-31 RX ADMIN — VENLAFAXINE 25 MG: 25 TABLET ORAL at 12:30

## 2024-01-31 NOTE — ASSESSMENT & PLAN NOTE
Secondary to severe COVID and multifocal pna  CXR with patchy infiltrates  On high flow oxygen still, wean as tolerated  Continue antibiotics and steroid taper  Encourage incentive spirometry

## 2024-01-31 NOTE — PROGRESS NOTES
Progress Note - Infectious Disease   Carla Hunt 58 y.o. female MRN: [id number]  Unit/Bed#: [facility] 207-01 Encounter: [id number]      IImpression/Recommendations:  Acute hypoxic respiratory failure, present on admission, secondary to mostly COVID but there was concern for concurrent bacterial pneumonia on admission due to elevated procalcitonin.  Therefore, patient completed treatment course for both COVID and bacterial pneumonia.  She was clinically improved with decreasing O2 requirement.  However, patient has developed worsening hypoxia over the last few days, acutely worse overnight, currently on high flow O2 support.  Chest CT more suggestive of COVID and postviral bacterial pneumonia.  Procalcitonin x 3 have been in the normal range.  Patient has no fever or leukocytosis.  She has minimal cough.  I suspect that her worsening hypoxia is still all secondary to inflammation from recent COVID.  Systemic corticosteroid was restarted.  Respiratory status is improved, most likely secondary to steroid restart.  Patient was started on Bactrim/doxycycline for stenotrophomonas in respiratory culture.  Given multiple normal procalcitonin, doubt pneumonia.  Consider tracheobronchitis..  Patient remains on high flow O2 support but respiratory symptom feels improved.  Continue Bactrim/doxycycline x 5-day course per pulmonary service.    Continue systemic corticosteroid.  Monitor temperature/WBC.  Monitor respiratory symptoms  Monitor O2 saturation and requirement.  Plan for O2 weaning per pulmonary service.     Severe COVID, present on admission.  Patient was treated with remdesivir, dexamethasone and was given 1 dose of Tocilizumab.  Given worsening hypoxic respiratory failure and still significant inflammation on chest CT, patient may benefit from another course of systemic corticosteroid.  Patient had 2 negative COVID antigen tests after treatment.  She was started back on systemic corticosteroids yesterday.   Worsening hypoxia overnight noted, currently on high flow O2 support.  No additional antiviral needed.  Continue systemic corticosteroids.     CKD.  Creatinine at baseline.  Monitor creatinine.     Encephalopathy on admission.  This has resolved.  There was concern for possible seizure but no witnessed seizure and EEG did not capture it.  Monitor mental status.     Seizure disorder, status post temporal lobe resection in .     B-cell lymphoma, on chemotherapy, which is currently postponed.  Patient was leukopenic on admission but WBC now is near normal range.  Monitor WBC/ANC.     Discussed with patient in detail regarding the above plan.     Antibiotics:  Bactrim/doxycycline     Subjective:  Patient remains on high flow O2 supplement but she continues to feel better.  Dyspnea at rest is improved.  Stable mild cough, mostly nonproductive.  Temperature stays down.  No chills.  No diarrhea.    Objective:  Vitals:  Temp:  [98.1 °F (36.7 °C)-98.6 °F (37 °C)] 98.6 °F (37 °C)  HR:  [76-96] 96  BP: ()/(53-77) 92/64  SpO2:  [88 %-97 %] 91 %  Temp (24hrs), Av.5 °F (36.9 °C), Min:98.1 °F (36.7 °C), Max:98.6 °F (37 °C)  Current: Temperature: 98.6 °F (37 °C)    Physical Exam:     General: Awake, alert, cooperative, no distress.   Neck:  Supple. No mass.  No lymphadenopathy.   Lungs: Expansion symmetric, stable basilar rhonchi, no rales, no wheezing, respirations labored.   Heart:  Regular rate and rhythm, S1 and S2 normal, no murmur.   Abdomen: Soft, nondistended, non-tender, bowel sounds active all four quadrants, no masses, no organomegaly.   Extremities: No edema. No erythema/warmth. No ulcer. Nontender to palpation.   Skin:  No rash.   Neuro: Moves all extremities.     Invasive Devices       Peripheral Intravenous Line  Duration             Peripheral IV 24 Proximal;Right;Ventral (anterior) Forearm 2 days              Drain  Duration             External Urinary Catheter 2 days                    Labs  studies:   I have personally reviewed pertinent labs.  Results from last 7 days   Lab Units 01/30/24  0630 01/29/24  0612 01/28/24  0759   POTASSIUM mmol/L 4.4 3.9 4.2   CHLORIDE mmol/L 108 106 106   CO2 mmol/L 24 26 25   BUN mg/dL 35* 34* 27*   CREATININE mg/dL 1.34* 1.26 1.17   EGFR ml/min/1.73sq m 44 47 51   CALCIUM mg/dL 9.8 9.9 9.7     Results from last 7 days   Lab Units 01/30/24  0630 01/29/24  0612 01/28/24  0555   WBC Thousand/uL 6.32 6.93 7.36   HEMOGLOBIN g/dL 11.8 11.8 12.8   PLATELETS Thousands/uL 188 186 171     Results from last 7 days   Lab Units 01/25/24  2120 01/25/24  1732   BLOOD CULTURE   --  No Growth After 5 Days.  No Growth After 5 Days.   SPUTUM CULTURE  Few Colonies of Stenotrophomonas maltophilia*  2+ Growth of  --    GRAM STAIN RESULT  Rare Epithelial cells per low power field  No Polys or Bacteria seen  --        Imaging Studies:   I have personally reviewed pertinent imaging study reports and images in PACS.    EKG, Pathology, and Other Studies:   I have personally reviewed pertinent reports.

## 2024-01-31 NOTE — UTILIZATION REVIEW
Continued Stay Review    Date: 1/31                          Current Patient Class: Inpatient  Current Level of Care: Med Surg    HPI:58 y.o. female initially admitted on 1/10     Assessment/Plan:   Per Pulmonology; Wean supplemental O2 to goal SpO2 >88%  TMP-sulfa and minocycline for a total of 5 days (day 4/5)  Continue prednisone taper, decrease by 10mg every 7 days   Add on CRP to today's labs    Per ID; Pt remains on high flow O2 support but respiratory symptom feels improved.  Continue Bactrim/doxycycline x 5-day course.     Progress notes; CXR with patchy infiltrates. Persistent hypoxia. Continues on High flow O2, wean as tolerated. Continue antibiotics and steriod taper.   CRP today significantly improved from prior values.  Decreased breath sounds bilateral.       Vital Signs:   01/31/24 1105 98.6 °F (37 °C) 96 92/64 78 91 % -- -- -- -- -- -- --   01/31/24 0800 98.1 °F (36.7 °C) 90 98/64 77 88 % Abnormal  -- 220 -- 50 L/min High flow nasal cannula -- Sitting   01/31/24 0300 98.6 °F (37 °C) 76 95/59 69 94 % -- -- -- -- -- -- --   01/31/24 0245 -- -- -- -- 95 % 55 -- 50 L/min -- High flow nasal cannula HFNC prongs --     Pertinent Labs/Diagnostic Results:       Results from last 7 days   Lab Units 01/30/24  0630 01/29/24  0612 01/28/24  0555 01/26/24  0454 01/25/24  1739   WBC Thousand/uL 6.32 6.93 7.36 4.28* 5.65   HEMOGLOBIN g/dL 11.8 11.8 12.8 11.2* 12.4   HEMATOCRIT % 36.3 36.5 41.5 34.7* 39.0   PLATELETS Thousands/uL 188 186 171 161 189   BANDS PCT %  --   --  25*  --  13*         Results from last 7 days   Lab Units 01/30/24  0630 01/29/24  0612 01/28/24  0759 01/26/24  0454 01/25/24  1739   SODIUM mmol/L 140 139 140 140 136   POTASSIUM mmol/L 4.4 3.9 4.2 4.2 3.8   CHLORIDE mmol/L 108 106 106 111* 103   CO2 mmol/L 24 26 25 26 27   ANION GAP mmol/L 8 7 9 3 6   BUN mg/dL 35* 34* 27* 24 35*   CREATININE mg/dL 1.34* 1.26 1.17 1.04 1.14   EGFR ml/min/1.73sq m 44 47 51 59 53   CALCIUM mg/dL 9.8 9.9 9.7 8.7  9.3   MAGNESIUM mg/dL 2.2 2.2 2.1  --   --    PHOSPHORUS mg/dL 3.0 3.0  --   --   --          Results from last 7 days   Lab Units 01/31/24  1104 01/31/24  0803 01/30/24  2354 01/30/24  2055 01/30/24  1645 01/30/24  1210 01/30/24  0846 01/30/24  0823 01/29/24  2118 01/29/24  1643 01/29/24  1124 01/29/24  0752   POC GLUCOSE mg/dl 116 71 123 208* 169* 84 74 64* 249* 134 132 83     Results from last 7 days   Lab Units 01/30/24  0630 01/29/24  0612 01/28/24  0759 01/26/24  0454 01/25/24  1739   GLUCOSE RANDOM mg/dL 75 79 132 83 112           Results from last 7 days   Lab Units 01/27/24  1228   PH NICA  7.333   PCO2 NICA mm Hg 48.0   PO2 NICA mm Hg 46.4*   HCO3 NICA mmol/L 24.9   BASE EXC NICA mmol/L -1.3   O2 CONTENT NICA ml/dL 12.9   O2 HGB, VENOUS % 77.0           Results from last 7 days   Lab Units 01/28/24  0759 01/27/24  0510 01/25/24  1739   PROCALCITONIN ng/ml 0.12 0.17 0.17                 Results from last 7 days   Lab Units 01/26/24  0454   BNP pg/mL 12           Results from last 7 days   Lab Units 01/25/24  2120 01/25/24  1732   BLOOD CULTURE   --  No Growth After 5 Days.  No Growth After 5 Days.   SPUTUM CULTURE  Few Colonies of Stenotrophomonas maltophilia*  2+ Growth of  --    GRAM STAIN RESULT  Rare Epithelial cells per low power field  No Polys or Bacteria seen  --        Medications:   Scheduled Medications:  chlorhexidine, 15 mL, Mouth/Throat, Q12H SARTHAK  enoxaparin, 40 mg, Subcutaneous, Daily  insulin lispro, 2-12 Units, Subcutaneous, TID With Meals  insulin lispro, 2-12 Units, Subcutaneous, HS  lamoTRIgine, 150 mg, Oral, BID  melatonin, 6 mg, Oral, HS  minocycline, 200 mg, Oral, Q12H SARTHAK  nystatin, 500,000 Units, Swish & Spit, 4x Daily  polyethylene glycol, 17 g, Oral, Daily  predniSONE, 60 mg, Oral, Daily   Followed by  [START ON 2/3/2024] predniSONE, 50 mg, Oral, Daily   Followed by  [START ON 2/10/2024] predniSONE, 40 mg, Oral, Daily   Followed by  [START ON 2/17/2024] predniSONE, 30 mg, Oral,  Daily   Followed by  [START ON 2/24/2024] predniSONE, 20 mg, Oral, Daily   Followed by  [START ON 3/2/2024] predniSONE, 10 mg, Oral, Daily   Followed by  [START ON 3/9/2024] predniSONE, 5 mg, Oral, Daily  QUEtiapine, 25 mg, Oral, HS  senna-docusate sodium, 2 tablet, Oral, BID  sulfamethoxazole-trimethoprim, 2 tablet, Oral, Q12H SARTHAK  topiramate, 100 mg, Oral, BID  venlafaxine, 25 mg, Oral, TID With Meals      Continuous IV Infusions: None     PRN Meds:  acetaminophen, 650 mg, Oral, Q6H PRN  bisacodyl, 10 mg, Rectal, Daily PRN  metoprolol, 2.5 mg, Intravenous, Q6H PRN  ondansetron, 4 mg, Intravenous, Q6H PRN        Discharge Plan: TBD    Network Utilization Review Department  ATTENTION: Please call with any questions or concerns to [phone] and carefully listen to the prompts so that you are directed to the right person. All voicemails are confidential.   For Discharge needs, contact Care Management DC Support Team at [phone] opt. [phone]  Send all requests for admission clinical reviews, approved or denied determinations and any other requests to dedicated fax number below belonging to the campus where the patient is receiving treatment. List of dedicated fax numbers for the Facilities:  FACILITY NAME UR FAX NUMBER   ADMISSION DENIALS (Administrative/Medical Necessity) [phone]   DISCHARGE SUPPORT TEAM (NETWORK) [phone]   PARENT CHILD HEALTH (Maternity/NICU/Pediatrics) [phone]   [facility] [phone]   [facility] [phone]   [facility] [phone]   [facility] [phone]   [facility] [phone]   [facility] [phone]   [facility] [phone]   [facility] [phone]   [facility] [phone]   Critical access hospital  [phone]   [facility] [phone]   [place] [phone]

## 2024-01-31 NOTE — ASSESSMENT & PLAN NOTE
Lab Results   Component Value Date    EGFR 44 01/30/2024    EGFR 47 01/29/2024    EGFR 51 01/28/2024    CREATININE 1.34 (H) 01/30/2024    CREATININE 1.26 01/29/2024    CREATININE 1.17 01/28/2024   Cr at baseline   Avoid nephrotoxins

## 2024-01-31 NOTE — PHYSICAL THERAPY NOTE
PHYSICAL THERAPY NOTE      Patient Name: Carla Hunt  Today's Date: 1/31/2024 01/31/24 1346   PT Last Visit   PT Visit Date 01/31/24   Note Type   Note Type Treatment   Pain Assessment   Pain Assessment Tool 0-10   Pain Score No Pain   Restrictions/Precautions   Weight Bearing Precautions Per Order No   Other Precautions Chair Alarm;Bed Alarm;Multiple lines;Telemetry;O2;Fall Risk  (HFNC)   General   Chart Reviewed Yes   Response to Previous Treatment Patient with no complaints from previous session.   Family/Caregiver Present Yes  (Spouse)   Cognition   Overall Cognitive Status WFL   Arousal/Participation Alert;Cooperative   Attention Within functional limits   Orientation Level Oriented X4   Memory Within functional limits   Following Commands Follows one step commands without difficulty   Comments Pt pleasant and cooperative t/o PT session   Bed Mobility   Supine to Sit Unable to assess   Sit to Supine 5  Supervision   Additional items Assist x 1;Increased time required   Additional Comments Upon arrival, pt sitting OOB in chair. Pt returned to bed w/ call light and all needs following PT session. SPO2 93% upon leaving the room   Transfers   Sit to Stand 4  Minimal assistance   Additional items Assist x 1;Increased time required;Armrests   Stand to Sit 4  Minimal assistance   Additional items Assist x 1;Armrests;Increased time required   Additional Comments Transfers w/ RW. Pt completed x4 STS t/o PT session   Ambulation/Elevation   Gait pattern Narrow HEATHER;Decreased foot clearance;Short stride;Excessively slow   Gait Assistance 4  Minimal assist   Additional items Assist x 1;Verbal cues   Assistive Device None   Distance 3'  (From chair to bed)   Ambulation/Elevation Additional Comments Further ambulation limited by low SPO2 sats. SPO2 dropped to 85% following short ambulation from bed to chair. Increased to >90% w/ time and  rest   Balance   Static Sitting Fair   Dynamic Sitting Fair -   Static Standing Fair -   Dynamic Standing Poor +   Ambulatory Poor +   Endurance Deficit   Endurance Deficit Yes   Endurance Deficit Description Low SPO2 readings, SIEGEL, deconditioning   Activity Tolerance   Activity Tolerance Patient limited by fatigue;Treatment limited secondary to medical complications (Comment)   Medical Staff Made Aware Respiratory therapist present for part and end of PT session and increased flow rate of HFNC   Nurse Made Aware RN cleared pt for PT session and updated post-session   Exercises   Hip Flexion Sitting;10 reps;AROM;Bilateral   Knee AROM Long Arc Quad Sitting;10 reps;AROM;Bilateral   Balance training  Pt completed x4 STS and stood for trials of 1-2 minutes w/ UE support at RW for balance. for trials 1-2 of STS: SPO2 sats remained above 90% for ~1 min before dropping to 85-87%. Seated rest breaks inbetween standing trials w/ cues for breathing while SPO2 slowly increased. Respiratory therapist increased flow rate of HFNC for standing trial 3 and SPO2 increased to 98% at rest w/ pt able to stand for 2 minutes w/ SPO2 above 90%   Assessment   Prognosis Good   Problem List Decreased endurance;Impaired balance;Decreased mobility   Assessment Pt seen for PT treatment session this date. Therapy session focused on standing tolerance, short distance ambulation and therex for LE strengthening in order to improve overall mobility and independence. Pt requires min A for transfers and ambulation. SPO2 monitored t/o PT session. Desats to 85% following 1-2 min of standing requiring increased time and rest to recover to >90%. Pt making slow progress toward goals 2/2 low endurance and activity tolerance for functional mobility. Pt was left supine in bed at the end of PT session with all needs in reach. Pt would benefit from continued PT services while in hospital to address remaining limitations. PT to continue treating pt and  recommends Level III resources. The patient's AM-PAC Basic Mobility Inpatient Short Form Raw Score is 19. A Raw score of greater than 16 suggests the patient may benefit from discharge to home. Please also refer to the recommendation of the Physical Therapist for safe discharge planning.   Barriers to Discharge None   Goals   Patient Goals to go home   STG Expiration Date 02/13/24   Plan   Treatment/Interventions Functional transfer training;LE strengthening/ROM;Elevations;Therapeutic exercise;Endurance training;Patient/family training;Equipment eval/education;Bed mobility;Gait training;Spoke to case management;Spoke to nursing;OT   Progress Progressing toward goals   PT Frequency 3-5x/wk   Discharge Recommendation   Rehab Resource Intensity Level, PT III (Minimum Resource Intensity)   Equipment Recommended Walker   Walker Package Recommended Wheeled walker   AM-PAC Basic Mobility Inpatient   Turning in Flat Bed Without Bedrails 4   Lying on Back to Sitting on Edge of Flat Bed Without Bedrails 4   Moving Bed to Chair 3   Standing Up From Chair Using Arms 3   Walk in Room 3   Climb 3-5 Stairs With Railing 2   Basic Mobility Inpatient Raw Score 19   Basic Mobility Standardized Score 42.48   Highest Level Of Mobility   JH-HLM Goal 6: Walk 10 steps or more   JH-HLM Achieved 5: Stand (1 or more minutes)   Education   Education Provided Mobility training   Patient Demonstrates acceptance/verbal understanding   End of Consult   Patient Position at End of Consult Supine;Bed/Chair alarm activated;All needs within reach     Debra Babin PT, DPT

## 2024-01-31 NOTE — PROGRESS NOTES
"Progress Note - Pulmonary   Carla Hunt 58 y.o. 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 She was downgraded from SD1 yesterday.     Objective:     Vitals: Blood pressure 95/59, pulse 76, temperature 98.6 °F (37 °C), temperature source Oral, resp. rate 22, height 5' 8\" (1.727 m), weight 73.8 kg (162 lb 12.8 oz), SpO2 94%.,Body mass index is 24.75 kg/m².      Intake/Output Summary (Last 24 hours) at 1/31/2024 0809  Last data filed at 1/30/2024 2200  Gross per 24 hour   Intake 940 ml   Output 1500 ml   Net -560 ml       Invasive Devices       Peripheral Intravenous Line  Duration             Peripheral IV 01/29/24 Proximal;Right;Ventral (anterior) Forearm 2 days              Drain  Duration             External Urinary Catheter 2 days                    Physical Exam:   General: No acute distress  HENT: Normocephalic, atraumatic.  PERRL, EOMI, Moist mucous membranes.  Neck: Supple  Lungs: Bilateral crackles, R>L.  On HFNC  Heart: Regular rate and rhythm, S1-S2 present, no murmurs rubs or gallops  Abdomen: Soft, nontender, nondistended, no organomegaly  Extremities: Warm and well perfused, no cyanosis, clubbing, or edema  Pulses: 2+ and symmetric  Skin: Warm, dry, no rashes or lesions  Neurologic: No focal neurologic deficits     Labs: I have personally reviewed pertinent lab results.    Imaging and other studies: I have personally reviewed pertinent films in " PACS  CTPE 1/25/2024  IMPRESSION:  No pulmonary embolus.  Extensive bilateral groundglass opacity with multifocal consolidation in the right lung, greatest in the right lower lobe, compatible with pneumonia.  Mild multifocal bronchial dilation which may be due to to developing pulmonary fibrosis, although in some cases of COVID-19 pneumonia, bronchial dilation is reversible and does not represent fibrosis.  Mild calcification of the left anterior descending coronary artery indicating atherosclerotic heart disease  Hepatic steatosis.    CXR 1/28/2024  IMPRESSION:  Persistent extensive patchy bilateral groundglass opacity and consolidation.

## 2024-01-31 NOTE — ASSESSMENT & PLAN NOTE
Started on severe pathway  S/p Actermra 1/9  S/p dexamethasone and remdesivir  Now with persistent hypoxia and on high flow oxygen  Wean oxygen as tolerated  CRP today significantly improved from prior values

## 2024-01-31 NOTE — PROGRESS NOTES
[facility]  Progress Note  Name: Carla Hunt I  MRN: [id number]  Unit/Bed#: [facility] 207-01 I Date of Admission: 1/10/2024   Date of Service: 1/31/2024 I Hospital Day: 21    Assessment/Plan   * Acute respiratory failure with hypoxia (HCC)  Assessment & Plan  Secondary to severe COVID and multifocal pna  CXR with patchy infiltrates  On high flow oxygen still, wean as tolerated  Continue antibiotics and steroid taper  Encourage incentive spirometry    Viral pneumonia  Assessment & Plan  Admitted with COVID multifocal pneumonia.  Plan as above    COVID  Assessment & Plan  Started on severe pathway  S/p Actermra 1/9  S/p dexamethasone and remdesivir  Now with persistent hypoxia and on high flow oxygen  Wean oxygen as tolerated  CRP today significantly improved from prior values      Seizure disorder (HCC)  Assessment & Plan  S/p Temporal lobectomy in 2007  Continue home Lamictal and Topamax    Teto marginal zone B-cell lymphoma (HCC)  Assessment & Plan  Outpatient f/u with oncology when stable    Stage 3b chronic kidney disease (CKD) (HCC)  Assessment & Plan  Lab Results   Component Value Date    EGFR 44 01/30/2024    EGFR 47 01/29/2024    EGFR 51 01/28/2024    CREATININE 1.34 (H) 01/30/2024    CREATININE 1.26 01/29/2024    CREATININE 1.17 01/28/2024   Cr at baseline   Avoid nephrotoxins    Anxiety state  Assessment & Plan  Continue home effexor                VTE Pharmacologic Prophylaxis: VTE Score: 11 Moderate Risk (Score 3-4) - Pharmacological DVT Prophylaxis Ordered: heparin.    Mobility:   Basic Mobility Inpatient Raw Score: 19  JH-HLM Goal: 6: Walk 10 steps or more  JH-HLM Achieved: 5: Stand (1 or more minutes)  HLM Goal NOT achieved. Continue with multidisciplinary rounding and encourage appropriate mobility to improve upon HLM goals.    Patient Centered Rounds: I performed bedside rounds with nursing staff today.   Discussions with Specialists or Other Care Team  Provider: nurseIAN    Education and Discussions with Family / Patient: Updated  () at bedside.    Total Time Spent on Date of Encounter in care of patient: 40 mins. This time was spent on one or more of the following: performing physical exam; counseling and coordination of care; obtaining or reviewing history; documenting in the medical record; reviewing/ordering tests, medications or procedures; communicating with other healthcare professionals and discussing with patient's family/caregivers.    Current Length of Stay: 21 day(s)  Current Patient Status: Inpatient   Certification Statement: The patient will continue to require additional inpatient hospital stay due to high flow  Discharge Plan: Anticipate discharge in >72 hrs to home.    Code Status: Level 1 - Full Code    Subjective:   Denies any new complaints. Tolerating her diet. Denies constipation or diarrhea. Denies any pain    Objective:     Vitals:   Temp (24hrs), Av.5 °F (36.9 °C), Min:98.1 °F (36.7 °C), Max:98.6 °F (37 °C)    Temp:  [98.1 °F (36.7 °C)-98.6 °F (37 °C)] 98.6 °F (37 °C)  HR:  [76-96] 96  BP: (85-98)/(53-64) 92/64  SpO2:  [88 %-96 %] 91 %  Body mass index is 24.75 kg/m².     Input and Output Summary (last 24 hours):     Intake/Output Summary (Last 24 hours) at 2024 1818  Last data filed at 2024 1811  Gross per 24 hour   Intake 480 ml   Output 1850 ml   Net -1370 ml       Physical Exam:   Physical Exam  Constitutional:       Appearance: Normal appearance.   HENT:      Head: Normocephalic and atraumatic.      Nose: Nose normal.   Eyes:      Extraocular Movements: Extraocular movements intact.   Cardiovascular:      Rate and Rhythm: Normal rate and regular rhythm.   Pulmonary:      Comments: Decreased breath sounds bilateral  Musculoskeletal:      Right lower leg: No edema.      Left lower leg: No edema.   Skin:     General: Skin is warm.      Findings: No bruising or lesion.   Neurological:      Mental  Status: She is alert and oriented to person, place, and time.          Additional Data:     Labs:  Results from last 7 days   Lab Units 01/30/24  0630 01/29/24  0612 01/28/24  0555   WBC Thousand/uL 6.32   < > 7.36   HEMOGLOBIN g/dL 11.8   < > 12.8   HEMATOCRIT % 36.3   < > 41.5   PLATELETS Thousands/uL 188   < > 171   BANDS PCT %  --   --  25*   LYMPHO PCT %  --   --  3*   MONO PCT %  --   --  1*   EOS PCT %  --   --  0    < > = values in this interval not displayed.     Results from last 7 days   Lab Units 01/30/24  0630   SODIUM mmol/L 140   POTASSIUM mmol/L 4.4   CHLORIDE mmol/L 108   CO2 mmol/L 24   BUN mg/dL 35*   CREATININE mg/dL 1.34*   ANION GAP mmol/L 8   CALCIUM mg/dL 9.8   GLUCOSE RANDOM mg/dL 75         Results from last 7 days   Lab Units 01/31/24  1647 01/31/24  1104 01/31/24  0803 01/30/24  2354 01/30/24  2055 01/30/24  1645 01/30/24  1210 01/30/24  0846 01/30/24  0823 01/29/24  2118 01/29/24  1643 01/29/24  1124   POC GLUCOSE mg/dl 125 116 71 123 208* 169* 84 74 64* 249* 134 132         Results from last 7 days   Lab Units 01/28/24  0759 01/27/24  0510 01/25/24  1739   PROCALCITONIN ng/ml 0.12 0.17 0.17       Lines/Drains:  Invasive Devices       Peripheral Intravenous Line  Duration             Peripheral IV 01/29/24 Proximal;Right;Ventral (anterior) Forearm 2 days              Drain  Duration             External Urinary Catheter 2 days                          Imaging: No pertinent imaging reviewed.    Recent Cultures (last 7 days):   Results from last 7 days   Lab Units 01/25/24  2120 01/25/24  1732   BLOOD CULTURE   --  No Growth After 5 Days.  No Growth After 5 Days.   SPUTUM CULTURE  Few Colonies of Stenotrophomonas maltophilia*  2+ Growth of  --    GRAM STAIN RESULT  Rare Epithelial cells per low power field  No Polys or Bacteria seen  --        Last 24 Hours Medication List:   Current Facility-Administered Medications   Medication Dose Route Frequency Provider Last Rate    acetaminophen   650 mg Oral Q6H PRN Scot Monsalve, DO      bisacodyl  10 mg Rectal Daily PRN Scot Monsalve, DO      chlorhexidine  15 mL Mouth/Throat Q12H SARTHAK Scot Monsalve, DO      enoxaparin  40 mg Subcutaneous Daily Scot Monsalve, DO      insulin lispro  2-12 Units Subcutaneous TID With Meals Scot Monsalve, DO      insulin lispro  2-12 Units Subcutaneous HS Scot Monsalve, DO      lamoTRIgine  150 mg Oral BID Scot Monsalve, DO      melatonin  6 mg Oral HS Scot Monsalve, DO      metoprolol  2.5 mg Intravenous Q6H PRN Scot Monsalve, DO      minocycline  200 mg Oral Q12H SARTHAK Scot Monsalve, DO      nystatin  500,000 Units Swish & Spit 4x Daily Eugenia Langston, DO      ondansetron  4 mg Intravenous Q6H PRN Scot Monsalve, DO      polyethylene glycol  17 g Oral Daily Scot Monsalve, DO      predniSONE  60 mg Oral Daily Risa Handley MD      Followed by    [START ON 2/3/2024] predniSONE  50 mg Oral Daily Risa Handley MD      Followed by    [START ON 2/10/2024] predniSONE  40 mg Oral Daily Risa Handley MD      Followed by    [START ON 2/17/2024] predniSONE  30 mg Oral Daily Risa Handley MD      Followed by    [START ON 2/24/2024] predniSONE  20 mg Oral Daily Risa Handley MD      Followed by    [START ON 3/2/2024] predniSONE  10 mg Oral Daily Risa Handley MD      Followed by    [START ON 3/9/2024] predniSONE  5 mg Oral Daily Risa Handley MD      QUEtiapine  25 mg Oral HS Scot Monsalve, DO      senna-docusate sodium  2 tablet Oral BID Scot Monsalve, DO      sulfamethoxazole-trimethoprim  2 tablet Oral Q12H SARTHAK Scto Monsalve, DO      topiramate  100 mg Oral BID Scot Monsalve, DO      venlafaxine  25 mg Oral TID With Meals Scot Monsalve,           Today, Patient Was Seen By: Milan Saavedra MD    **Please Note: This note may have been constructed using a voice recognition system.**

## 2024-02-01 LAB
ANION GAP SERPL CALCULATED.3IONS-SCNC: 10 MMOL/L
ANISOCYTOSIS BLD QL SMEAR: PRESENT
BACTERIA UR QL AUTO: ABNORMAL /HPF
BASOPHILS # BLD MANUAL: 0 THOUSAND/UL (ref 0–0.1)
BASOPHILS NFR MAR MANUAL: 0 % (ref 0–1)
BILIRUB UR QL STRIP: NEGATIVE
BUN SERPL-MCNC: 53 MG/DL (ref 5–25)
CALCIUM SERPL-MCNC: 10.3 MG/DL (ref 8.4–10.2)
CHLORIDE SERPL-SCNC: 105 MMOL/L (ref 96–108)
CLARITY UR: CLEAR
CO2 SERPL-SCNC: 21 MMOL/L (ref 21–32)
COLOR UR: ABNORMAL
CREAT SERPL-MCNC: 1.45 MG/DL (ref 0.6–1.3)
EOSINOPHIL # BLD MANUAL: 0 THOUSAND/UL (ref 0–0.4)
EOSINOPHIL NFR BLD MANUAL: 0 % (ref 0–6)
ERYTHROCYTE [DISTWIDTH] IN BLOOD BY AUTOMATED COUNT: 18.5 % (ref 11.6–15.1)
GFR SERPL CREATININE-BSD FRML MDRD: 40 ML/MIN/1.73SQ M
GIANT PLATELETS BLD QL SMEAR: PRESENT
GLUCOSE SERPL-MCNC: 105 MG/DL (ref 65–140)
GLUCOSE SERPL-MCNC: 134 MG/DL (ref 65–140)
GLUCOSE SERPL-MCNC: 137 MG/DL (ref 65–140)
GLUCOSE SERPL-MCNC: 143 MG/DL (ref 65–140)
GLUCOSE SERPL-MCNC: 230 MG/DL (ref 65–140)
GLUCOSE SERPL-MCNC: 66 MG/DL (ref 65–140)
GLUCOSE UR STRIP-MCNC: NEGATIVE MG/DL
HCT VFR BLD AUTO: 43.1 % (ref 34.8–46.1)
HGB BLD-MCNC: 13.7 G/DL (ref 11.5–15.4)
HGB UR QL STRIP.AUTO: NEGATIVE
KETONES UR STRIP-MCNC: NEGATIVE MG/DL
LEUKOCYTE ESTERASE UR QL STRIP: NEGATIVE
LYMPHOCYTES # BLD AUTO: 0 % (ref 14–44)
LYMPHOCYTES # BLD AUTO: 0 THOUSAND/UL (ref 0.6–4.47)
MAGNESIUM SERPL-MCNC: 2.1 MG/DL (ref 1.9–2.7)
MCH RBC QN AUTO: 30 PG (ref 26.8–34.3)
MCHC RBC AUTO-ENTMCNC: 31.8 G/DL (ref 31.4–37.4)
MCV RBC AUTO: 94 FL (ref 82–98)
MONOCYTES # BLD AUTO: 0.22 THOUSAND/UL (ref 0–1.22)
MONOCYTES NFR BLD: 2 % (ref 4–12)
NEUTROPHILS # BLD MANUAL: 10.76 THOUSAND/UL (ref 1.85–7.62)
NEUTS SEG NFR BLD AUTO: 98 % (ref 43–75)
NITRITE UR QL STRIP: NEGATIVE
NON-SQ EPI CELLS URNS QL MICRO: ABNORMAL /HPF
OVALOCYTES BLD QL SMEAR: PRESENT
PH UR STRIP.AUTO: 6.5 [PH]
PHOSPHATE SERPL-MCNC: 4.5 MG/DL (ref 2.7–4.5)
PLATELET # BLD AUTO: 240 THOUSANDS/UL (ref 149–390)
PLATELET BLD QL SMEAR: ADEQUATE
PMV BLD AUTO: 11.4 FL (ref 8.9–12.7)
POIKILOCYTOSIS BLD QL SMEAR: PRESENT
POLYCHROMASIA BLD QL SMEAR: PRESENT
POTASSIUM SERPL-SCNC: 5 MMOL/L (ref 3.5–5.3)
PROT UR STRIP-MCNC: NEGATIVE MG/DL
RBC # BLD AUTO: 4.57 MILLION/UL (ref 3.81–5.12)
RBC #/AREA URNS AUTO: ABNORMAL /HPF
RBC MORPH BLD: PRESENT
SODIUM SERPL-SCNC: 136 MMOL/L (ref 135–147)
SP GR UR STRIP.AUTO: 1.01 (ref 1–1.03)
UROBILINOGEN UR STRIP-ACNC: <2 MG/DL
WBC # BLD AUTO: 10.98 THOUSAND/UL (ref 4.31–10.16)
WBC #/AREA URNS AUTO: ABNORMAL /HPF

## 2024-02-01 PROCEDURE — 81001 URINALYSIS AUTO W/SCOPE: CPT

## 2024-02-01 PROCEDURE — 85027 COMPLETE CBC AUTOMATED: CPT

## 2024-02-01 PROCEDURE — 82948 REAGENT STRIP/BLOOD GLUCOSE: CPT

## 2024-02-01 PROCEDURE — 84100 ASSAY OF PHOSPHORUS: CPT

## 2024-02-01 PROCEDURE — 99233 SBSQ HOSP IP/OBS HIGH 50: CPT | Performed by: INTERNAL MEDICINE

## 2024-02-01 PROCEDURE — 94668 MNPJ CHEST WALL SBSQ: CPT

## 2024-02-01 PROCEDURE — 85007 BL SMEAR W/DIFF WBC COUNT: CPT

## 2024-02-01 PROCEDURE — 99232 SBSQ HOSP IP/OBS MODERATE 35: CPT | Performed by: INTERNAL MEDICINE

## 2024-02-01 PROCEDURE — 94664 DEMO&/EVAL PT USE INHALER: CPT

## 2024-02-01 PROCEDURE — 94760 N-INVAS EAR/PLS OXIMETRY 1: CPT

## 2024-02-01 PROCEDURE — 83735 ASSAY OF MAGNESIUM: CPT

## 2024-02-01 PROCEDURE — 80048 BASIC METABOLIC PNL TOTAL CA: CPT

## 2024-02-01 RX ORDER — SODIUM CHLORIDE, SODIUM GLUCONATE, SODIUM ACETATE, POTASSIUM CHLORIDE, MAGNESIUM CHLORIDE, SODIUM PHOSPHATE, DIBASIC, AND POTASSIUM PHOSPHATE .53; .5; .37; .037; .03; .012; .00082 G/100ML; G/100ML; G/100ML; G/100ML; G/100ML; G/100ML; G/100ML
1000 INJECTION, SOLUTION INTRAVENOUS ONCE
Status: CANCELLED | OUTPATIENT
Start: 2024-02-01 | End: 2024-02-01

## 2024-02-01 RX ORDER — SODIUM CHLORIDE, SODIUM GLUCONATE, SODIUM ACETATE, POTASSIUM CHLORIDE, MAGNESIUM CHLORIDE, SODIUM PHOSPHATE, DIBASIC, AND POTASSIUM PHOSPHATE .53; .5; .37; .037; .03; .012; .00082 G/100ML; G/100ML; G/100ML; G/100ML; G/100ML; G/100ML; G/100ML
1000 INJECTION, SOLUTION INTRAVENOUS ONCE
Status: COMPLETED | OUTPATIENT
Start: 2024-02-01 | End: 2024-02-01

## 2024-02-01 RX ADMIN — PREDNISONE 60 MG: 20 TABLET ORAL at 10:25

## 2024-02-01 RX ADMIN — MINOCYCLINE HYDROCHLORIDE 200 MG: 100 CAPSULE ORAL at 21:50

## 2024-02-01 RX ADMIN — VENLAFAXINE 25 MG: 25 TABLET ORAL at 12:18

## 2024-02-01 RX ADMIN — MINOCYCLINE HYDROCHLORIDE 200 MG: 100 CAPSULE ORAL at 08:44

## 2024-02-01 RX ADMIN — POLYETHYLENE GLYCOL 3350 17 G: 17 POWDER, FOR SOLUTION ORAL at 10:26

## 2024-02-01 RX ADMIN — NYSTATIN 500000 UNITS: 100000 SUSPENSION ORAL at 21:53

## 2024-02-01 RX ADMIN — QUETIAPINE 25 MG: 25 TABLET ORAL at 21:51

## 2024-02-01 RX ADMIN — CHLORHEXIDINE GLUCONATE 15 ML: 1.2 SOLUTION ORAL at 21:50

## 2024-02-01 RX ADMIN — TOPIRAMATE 100 MG: 100 TABLET, FILM COATED ORAL at 08:44

## 2024-02-01 RX ADMIN — SODIUM CHLORIDE, SODIUM GLUCONATE, SODIUM ACETATE, POTASSIUM CHLORIDE, MAGNESIUM CHLORIDE, SODIUM PHOSPHATE, DIBASIC, AND POTASSIUM PHOSPHATE 1000 ML: .53; .5; .37; .037; .03; .012; .00082 INJECTION, SOLUTION INTRAVENOUS at 16:43

## 2024-02-01 RX ADMIN — SULFAMETHOXAZOLE AND TRIMETHOPRIM 2 TABLET: 800; 160 TABLET ORAL at 21:51

## 2024-02-01 RX ADMIN — NYSTATIN 500000 UNITS: 100000 SUSPENSION ORAL at 06:18

## 2024-02-01 RX ADMIN — LAMOTRIGINE 150 MG: 100 TABLET ORAL at 21:50

## 2024-02-01 RX ADMIN — ENOXAPARIN SODIUM 40 MG: 40 INJECTION SUBCUTANEOUS at 10:26

## 2024-02-01 RX ADMIN — CHLORHEXIDINE GLUCONATE 15 ML: 1.2 SOLUTION ORAL at 10:26

## 2024-02-01 RX ADMIN — MELATONIN 6 MG: at 21:50

## 2024-02-01 RX ADMIN — SULFAMETHOXAZOLE AND TRIMETHOPRIM 2 TABLET: 800; 160 TABLET ORAL at 10:25

## 2024-02-01 RX ADMIN — SENNOSIDES, DOCUSATE SODIUM 2 TABLET: 8.6; 5 TABLET ORAL at 10:25

## 2024-02-01 RX ADMIN — VENLAFAXINE 25 MG: 25 TABLET ORAL at 17:59

## 2024-02-01 RX ADMIN — VENLAFAXINE 25 MG: 25 TABLET ORAL at 08:45

## 2024-02-01 RX ADMIN — TOPIRAMATE 100 MG: 100 TABLET, FILM COATED ORAL at 17:59

## 2024-02-01 RX ADMIN — NYSTATIN 500000 UNITS: 100000 SUSPENSION ORAL at 10:26

## 2024-02-01 RX ADMIN — NYSTATIN 500000 UNITS: 100000 SUSPENSION ORAL at 14:27

## 2024-02-01 RX ADMIN — LAMOTRIGINE 150 MG: 100 TABLET ORAL at 08:44

## 2024-02-01 RX ADMIN — INSULIN LISPRO 4 UNITS: 100 INJECTION, SOLUTION INTRAVENOUS; SUBCUTANEOUS at 21:50

## 2024-02-01 NOTE — PROGRESS NOTES
Progress Note - Infectious Disease   Carla Hunt 58 y.o. female MRN: [id number]  Unit/Bed#: [facility] 207-01 Encounter: [id number]      Impression/Recommendations:  Acute hypoxic respiratory failure, present on admission, secondary to mostly COVID but there was concern for concurrent bacterial pneumonia on admission due to elevated procalcitonin.  Therefore, patient completed treatment course for both COVID and bacterial pneumonia.  She was clinically improved with decreasing O2 requirement.  However, patient has developed worsening hypoxia over the last few days, acutely worse overnight, currently on high flow O2 support.  Chest CT more suggestive of COVID and postviral bacterial pneumonia.  Procalcitonin x 3 have been in the normal range.  Patient has no fever or leukocytosis.  She has minimal cough.  I suspect that her worsening hypoxia is still all secondary to inflammation from recent COVID.  Systemic corticosteroid was restarted.  Respiratory status is improved, most likely secondary to steroid restart.  Patient was started on Bactrim/doxycycline for stenotrophomonas in respiratory culture.  Given multiple normal procalcitonin, doubt pneumonia.  Consider tracheobronchitis..  Patient remains on high flow O2 support but respiratory symptom feels improved.  Complete Bactrim/doxycycline x 5-day course per pulmonary service.    Continue systemic corticosteroid.  Monitor temperature/WBC.  Monitor respiratory symptoms  Monitor O2 saturation and requirement.  Plan for O2 weaning per pulmonary service.     Severe COVID, present on admission.  Patient was treated with remdesivir, dexamethasone and was given 1 dose of Tocilizumab.  Given worsening hypoxic respiratory failure and still significant inflammation on chest CT, patient may benefit from another course of systemic corticosteroid.  Patient had 2 negative COVID antigen tests after treatment.  She was started back on systemic corticosteroids.  Worsening hypoxia,  currently on high flow O2 support but symptomatically improved.  No additional antiviral needed.  Continue systemic corticosteroids.     CKD.  Creatinine elevated today.  Monitor creatinine.     Encephalopathy on admission.  This has resolved.  There was concern for possible seizure but no witnessed seizure and EEG did not capture it.  Monitor mental status.     Seizure disorder, status post temporal lobe resection in .     B-cell lymphoma, on chemotherapy, which is currently postponed.  Patient was leukopenic on admission but WBC now is near normal range.  Monitor WBC/ANC.     Discussed with patient in detail regarding the above plan.     Antibiotics:  Bactrim/doxycycline     Subjective:  Patient remains on high flow O2 supplement but dyspnea continues to improve.  Stable mild cough, mostly nonproductive.  Temperature stays down.  No chills.  No diarrhea.    Objective:  Vitals:  Temp:  [98.1 °F (36.7 °C)-98.9 °F (37.2 °C)] 98.4 °F (36.9 °C)  HR:  [94-96] 94  Resp:  [16] 16  BP: ()/(64-73) 92/66  SpO2:  [91 %-95 %] 95 %  Temp (24hrs), Av.5 °F (36.9 °C), Min:98.1 °F (36.7 °C), Max:98.9 °F (37.2 °C)  Current: Temperature: 98.4 °F (36.9 °C)    Physical Exam:     General: Awake, alert, cooperative, no distress.   Neck:  Supple. No mass.  No lymphadenopathy.   Lungs: Expansion symmetric, stable mild diffuse rhonchi, no rales, no wheezing, respirations labored.   Heart:  Regular rate and rhythm, S1 and S2 normal, no murmur.   Abdomen: Soft, nondistended, non-tender, bowel sounds active all four quadrants, no masses, no organomegaly.   Extremities: Stable mild leg edema. No erythema/warmth. No ulcer. Nontender to palpation.   Skin:  No rash.   Neuro: Moves all extremities.     Invasive Devices       Peripheral Intravenous Line  Duration             Peripheral IV 24 Proximal;Right;Ventral (anterior) Forearm 3 days              Drain  Duration             External Urinary Catheter 3 days                     Labs studies:   I have personally reviewed pertinent labs.  Results from last 7 days   Lab Units 01/30/24  0630 01/29/24  0612 01/28/24  0759   POTASSIUM mmol/L 4.4 3.9 4.2   CHLORIDE mmol/L 108 106 106   CO2 mmol/L 24 26 25   BUN mg/dL 35* 34* 27*   CREATININE mg/dL 1.34* 1.26 1.17   EGFR ml/min/1.73sq m 44 47 51   CALCIUM mg/dL 9.8 9.9 9.7     Results from last 7 days   Lab Units 01/30/24  0630 01/29/24  0612 01/28/24  0555   WBC Thousand/uL 6.32 6.93 7.36   HEMOGLOBIN g/dL 11.8 11.8 12.8   PLATELETS Thousands/uL 188 186 171     Results from last 7 days   Lab Units 01/25/24  2120 01/25/24  1732   BLOOD CULTURE   --  No Growth After 5 Days.  No Growth After 5 Days.   SPUTUM CULTURE  Few Colonies of Stenotrophomonas maltophilia*  2+ Growth of  --    GRAM STAIN RESULT  Rare Epithelial cells per low power field  No Polys or Bacteria seen  --        Imaging Studies:   I have personally reviewed pertinent imaging study reports and images in PACS.    EKG, Pathology, and Other Studies:   I have personally reviewed pertinent reports.

## 2024-02-01 NOTE — PROGRESS NOTES
[facility]  MICU Acceptance Note: Critical Care  Name: Carla Hunt 58 y.o. female I MRN: [id number]  Unit/Bed#: ICCU 207-01 I Date of Admission: 1/10/2024   Date of Service: 2/1/2024 I Hospital Day: 22    HPI: 59 yo female, PMH epilepsy, anxiety, CKD 3, B cell lymphoma on chemo. She initially presented to [place] on 1/8 with AMS, weakness, and poor appetite. She had previously been treated for UTI outpatient and completed course of ciprofloxacin. She was found to be COVID positive on admission. Presenting symptoms were concerning for stroke, CTH revealed possible SAH but no intervention was required per neurosurgery. There was also possible lacunar infarct. Routine EEG was negative. Despite treatment condition continued to worsen. LP was performed and she was started on empiric abx for meningitis. She was transferred to \[facility]\"" for vEEG on 1/10. LP studies appeared benign. Video EEG did not reveal any seizure activity. She was treated on severe COVID pathway initially requiring HFNC and given abx with CTX. She was transferred out of the ICU on 1/16 and at that time was gradually weaning down from MFNC. Pulmonology was consulted on 1/26 for possible bronchoscopy due to newly increasing O2 requirements since 1/24. CT chest revealed fibrosis and GGO likely from COVID along with RLL pna. ID has been following and did not recommend repeat course of abx as case appears consistent with post-viral rather than bacterial pna. Sputum cultures were unremarkable. LDH was checked given pt's immunocompromised status on chemo and was mildly elevated at 326. Steroids were reinitiated to complete taper as they had previously been abruptly d/c's upon completion of COVID pathway. Morning of 1/30 pt continued to have escalating O2 requirements from 10L to HFNC at 100% FiO2 - therefore decision was made to upgrade her to critical care. She was continued on steroids with plan for slow taper and abx  with minocycline and bactrim x5 days. She was weaned to HFNC 50L 55% and was downgraded to SD2. On 2/1 she again had escalating O2 requirements so was upgraded to SD1 under CC again.     Assessment/Plan   Neuro:   Diagnosis: seizure disorder  S/p partial left temporal lobe resection in 2007  Contuinue home lamictal 150 bid and topamax 100 bid  Diagnosis: anxiety  Continue home effexor  Seroquel 25 hs and melatonin 6 hs for sleep     CV:   No active issues     Pulm:  Diagnosis: acute hypoxic respiratory failure   Tested COVID positive on 1/7  Completed severe COVID pathway and 7 days CTX  Initially doing well with decreasing O2 requirements after transfer out of ICU, but pulm consulted 1/26 for increasing O2 requirements as of 1/24  Steroids restarted for taper due to abrupt cessation upon completion of COVID pathway   Morning of 1/28 pt requiring HFNC 100% so was upgraded to SD1 under critical care, downgraded to SD2 on 1/30 but continued to have high O2 requirements so was upgraded to CC again 2/1  Imaging findings with diffuse GGO and multifocal consolidations as well as bronchial dilation - concern that this could represent fibrosis 2/2 rituxan, diffuse pattern not as consistent with post-COVID fibrosis   Current O2 requirements: HFNC 45L 60%  Continue to wean supplemental O2 as tolerated for SpO2 >90%  Slow prednisone taper ordered, currently on 60mg through 2/2  CRP downtrending   See remainder of plan under ID     GI:   No active issues  Bowel regimen with daily miralax and bid senna      :   Diagnosis: CKD III  Baseline Cr appears to be 0.8-1.2  ELIESER on arrival which resolved  When last on CC service Cr had been uptrending, no labs collected since this time so will redraw labs now  Continue to monitor I/O and UOP     F/E/N:   F: none   E: monitor and replete for goal K>4, P>3, Mg>2  N: regular house      Heme/Onc:   Diagnosis: B cell lymphoma  Follows with heme[facility]onc at [facility]  On rituxan for 2 year course,  started May 2022  Last dose was 12/20, treatment currently on hold   Leukopenic on admission but WBC now wnl  DVT ppx with lovenox      Endo:   Diagnosis: steroid hyperglycemia   SSI, hypoglycemic protocol  Goal -180     ID:   Diagnosis: COVID pneumonia vs tracheobronchitis  Completed severe COVID pathway with decadron (ended 1/22) and remdesivir (ended 1/20), also completed 7 days CXT on 1/15  C/f opportunistic infections given immunocompromised status on chemotherapy and recent steroid use  No consolidations on CXR and procal negative  Slow prednisone taper as in pulm plan  Abx reinitiated with minocycline 200 bid and bactrim 800-160 bid when admitted to CC due to acuity of condition, will finish 5 day course today  ID following, appreciate recommendations      MSK/Skin:   No active issues    Disposition: Stepdown Level 1    ICU Core Measures     A: Assess, Prevent, and Manage Pain Has pain been assessed? Yes  Need for changes to pain regimen? No   B: Both SAT/SAT  N/A   C: Choice of Sedation RASS Goal: 0 Alert and Calm  Need for changes to sedation or analgesia regimen? No   D: Delirium CAM-ICU: Negative   E: Early Mobility  Plan for early mobility? Yes   F: Family Engagement Plan for family engagement today? Yes       Antibiotic Review: Antibiotics to be discontinued today    Review of Invasive Devices:            Prophylaxis:  VTE VTE covered by:  enoxaparin, Subcutaneous, 40 mg at 02/01/24 1026       Stress Ulcer  not ordered        Significant 24hr Events        Subjective     Review of Systems   Constitutional:  Negative for appetite change, chills and fever.   HENT:  Negative for congestion.    Respiratory:  Positive for shortness of breath.    Cardiovascular:  Negative for chest pain.   Gastrointestinal:  Negative for abdominal pain, diarrhea, nausea and vomiting.        Objective                            Vitals I/O      Most Recent Min/Max in 24hrs   Temp 98.3 °F (36.8 °C) Temp  Min: 98.1 °F (36.7  °C)  Max: 98.9 °F (37.2 °C)   Pulse (!) 114 Pulse  Min: 94  Max: 114   Resp 16 Resp  Min: 16  Max: 16   BP (!) 86/60 BP  Min: 78/56  Max: 106/73   O2 Sat 95 % SpO2  Min: 95 %  Max: 95 %      Intake/Output Summary (Last 24 hours) at 2/1/2024 1411  Last data filed at 2/1/2024 1300  Gross per 24 hour   Intake 360 ml   Output 2130 ml   Net -1770 ml       Diet Regular; Regular House    Invasive Monitoring   None         Physical Exam   Physical Exam  Eyes:      General:         Right eye: No discharge.         Left eye: No discharge.      Conjunctiva/sclera: Conjunctivae normal.   Skin:     General: Skin is warm and dry.   HENT:      Head: Normocephalic and atraumatic.      Right Ear: No drainage.      Left Ear: No drainage.      Nose: Epistaxis present. No nasogastric tube.      Mouth/Throat:      Mouth: Mucous membranes are moist.   Cardiovascular:      Rate and Rhythm: Normal rate and regular rhythm.      Heart sounds: Normal heart sounds.   Musculoskeletal:      Right lower leg: No edema.      Left lower leg: No edema.   Abdominal: General: Bowel sounds are normal. There is no distension.      Palpations: Abdomen is soft.      Tenderness: There is no abdominal tenderness. There is no guarding.   Constitutional:       General: She is not in acute distress.     Interventions: She is not sedated, not intubated and not restrained.  Pulmonary:      Effort: Pulmonary effort is normal. No respiratory distress. She is not intubated.      Breath sounds: Rales (bilateral bases) present.   Neurological:      Mental Status: She is alert and oriented to person, place and time. She is calm.            Diagnostic Studies      EKG: no new  Imaging: no new      Medications:  Scheduled PRN   chlorhexidine, 15 mL, Q12H SARTHAK  enoxaparin, 40 mg, Daily  insulin lispro, 2-12 Units, TID With Meals  insulin lispro, 2-12 Units, HS  lamoTRIgine, 150 mg, BID  melatonin, 6 mg, HS  minocycline, 200 mg, Q12H SARTHAK  nystatin, 500,000 Units, 4x  Daily  polyethylene glycol, 17 g, Daily  predniSONE, 60 mg, Daily   Followed by  [START ON 2/3/2024] predniSONE, 50 mg, Daily   Followed by  [START ON 2/10/2024] predniSONE, 40 mg, Daily   Followed by  [START ON 2/17/2024] predniSONE, 30 mg, Daily   Followed by  [START ON 2/24/2024] predniSONE, 20 mg, Daily   Followed by  [START ON 3/2/2024] predniSONE, 10 mg, Daily   Followed by  [START ON 3/9/2024] predniSONE, 5 mg, Daily  QUEtiapine, 25 mg, HS  senna-docusate sodium, 2 tablet, BID  sulfamethoxazole-trimethoprim, 2 tablet, Q12H SARTHAK  topiramate, 100 mg, BID  venlafaxine, 25 mg, TID With Meals      acetaminophen, 650 mg, Q6H PRN  bisacodyl, 10 mg, Daily PRN  metoprolol, 2.5 mg, Q6H PRN  ondansetron, 4 mg, Q6H PRN       Continuous          Labs:    CBC    No recent results  BMP    No recent results    Coags    No recent results     Additional Electrolytes  No recent results       Blood Gas    No recent results  No recent results LFTs  No recent results    Infectious  No recent results  Glucose  No recent results                Risa Handley MD

## 2024-02-01 NOTE — PROGRESS NOTES
"Progress Note - Pulmonary   Carla Hunt 58 y.o. 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 Will continue prednisone taper at this time   - Respiratory status is too tenuous at this time to consider bronchoscopy     Subjective:   Patient seen and examined at bedside. CRP added on to labs yesterday was 5.6, continues to trend down from 110 at admission. Remains on HFNC 55/50. Continues to have significant desaturations with movement to the 60s per nursing. She requires nonrebreathing with movement.     Objective:     Vitals: Blood pressure 106/73, pulse 96, temperature 98.1 °F (36.7 °C), temperature source Oral, resp. rate 16, height 5' 8\" (1.727 m), weight 74 kg (163 lb 1.6 oz), SpO2 95%.,Body mass index is 24.8 kg/m².      Intake/Output Summary (Last 24 hours) at 2/1/2024 0723  Last data filed at 2/1/2024 0601  Gross per 24 hour   Intake 480 ml   Output 2250 ml   Net -1770 ml       Invasive Devices       Peripheral Intravenous Line  Duration             Peripheral IV 01/29/24 Proximal;Right;Ventral (anterior) Forearm 3 days              Drain  Duration             External Urinary Catheter 3 days                    Physical Exam:    General: No acute distress  HENT: Normocephalic, atraumatic.  PERRL, EOMI, Moist mucous membranes.  Neck: Supple  Lungs: Bilateral crackles, R>L.  On HFNC  Heart: Regular rate and rhythm, S1-S2 present, no murmurs rubs or gallops  Abdomen: Soft, nontender, nondistended, no organomegaly  Extremities: Warm and well perfused, no cyanosis, clubbing, or " edema  Pulses: 2+ and symmetric  Skin: Warm, dry, no rashes or lesions  Neurologic: No focal neurologic deficits     Labs: I have personally reviewed pertinent lab results.    Imaging and other studies: I have personally reviewed pertinent films in PACS

## 2024-02-02 ENCOUNTER — APPOINTMENT (INPATIENT)
Dept: GASTROENTEROLOGY | Facility: HOSPITAL | Age: 59
DRG: 003 | End: 2024-02-02
Attending: INTERNAL MEDICINE
Payer: COMMERCIAL

## 2024-02-02 ENCOUNTER — ANESTHESIA (INPATIENT)
Dept: GASTROENTEROLOGY | Facility: HOSPITAL | Age: 59
End: 2024-02-02
Payer: COMMERCIAL

## 2024-02-02 ENCOUNTER — ANESTHESIA EVENT (INPATIENT)
Dept: GASTROENTEROLOGY | Facility: HOSPITAL | Age: 59
End: 2024-02-02
Payer: COMMERCIAL

## 2024-02-02 LAB
ANION GAP SERPL CALCULATED.3IONS-SCNC: 11 MMOL/L
BUN SERPL-MCNC: 44 MG/DL (ref 5–25)
CALCIUM SERPL-MCNC: 10 MG/DL (ref 8.4–10.2)
CHLORIDE SERPL-SCNC: 109 MMOL/L (ref 96–108)
CO2 SERPL-SCNC: 20 MMOL/L (ref 21–32)
CREAT SERPL-MCNC: 1.26 MG/DL (ref 0.6–1.3)
EOSINOPHIL NFR FLD MANUAL: 2 %
ERYTHROCYTE [DISTWIDTH] IN BLOOD BY AUTOMATED COUNT: 18.5 % (ref 11.6–15.1)
GFR SERPL CREATININE-BSD FRML MDRD: 47 ML/MIN/1.73SQ M
GLUCOSE SERPL-MCNC: 155 MG/DL (ref 65–140)
GLUCOSE SERPL-MCNC: 173 MG/DL (ref 65–140)
GLUCOSE SERPL-MCNC: 71 MG/DL (ref 65–140)
GLUCOSE SERPL-MCNC: 81 MG/DL (ref 65–140)
GLUCOSE SERPL-MCNC: 88 MG/DL (ref 65–140)
HCT VFR BLD AUTO: 41.9 % (ref 34.8–46.1)
HGB BLD-MCNC: 13.3 G/DL (ref 11.5–15.4)
LYMPHOCYTES NFR BLD AUTO: 46 %
LYMPHOCYTES NFR BLD AUTO: 52 %
MACROPHAGES NFR FLD: 13 %
MACROPHAGES NFR FLD: 30 %
MAGNESIUM SERPL-MCNC: 2 MG/DL (ref 1.9–2.7)
MCH RBC QN AUTO: 29.8 PG (ref 26.8–34.3)
MCHC RBC AUTO-ENTMCNC: 31.7 G/DL (ref 31.4–37.4)
MCV RBC AUTO: 94 FL (ref 82–98)
NEUTS SEG NFR BLD AUTO: 16 %
NEUTS SEG NFR BLD AUTO: 41 %
NRBC BLD AUTO-RTO: 0 /100 WBCS
PHOSPHATE SERPL-MCNC: 4 MG/DL (ref 2.7–4.5)
PLATELET # BLD AUTO: 247 THOUSANDS/UL (ref 149–390)
PMV BLD AUTO: 11.4 FL (ref 8.9–12.7)
POTASSIUM SERPL-SCNC: 4.9 MMOL/L (ref 3.5–5.3)
RBC # BLD AUTO: 4.47 MILLION/UL (ref 3.81–5.12)
SODIUM SERPL-SCNC: 140 MMOL/L (ref 135–147)
TOTAL CELLS COUNTED SPEC: 100
TOTAL CELLS COUNTED SPEC: 100
WBC # BLD AUTO: 6.75 THOUSAND/UL (ref 4.31–10.16)

## 2024-02-02 PROCEDURE — 31624 DX BRONCHOSCOPE/LAVAGE: CPT | Performed by: INTERNAL MEDICINE

## 2024-02-02 PROCEDURE — 99233 SBSQ HOSP IP/OBS HIGH 50: CPT | Performed by: INTERNAL MEDICINE

## 2024-02-02 PROCEDURE — 83735 ASSAY OF MAGNESIUM: CPT

## 2024-02-02 PROCEDURE — 88185 FLOWCYTOMETRY/TC ADD-ON: CPT | Performed by: INTERNAL MEDICINE

## 2024-02-02 PROCEDURE — 94760 N-INVAS EAR/PLS OXIMETRY 1: CPT

## 2024-02-02 PROCEDURE — 87116 MYCOBACTERIA CULTURE: CPT | Performed by: INTERNAL MEDICINE

## 2024-02-02 PROCEDURE — 87252 VIRUS INOCULATION TISSUE: CPT | Performed by: INTERNAL MEDICINE

## 2024-02-02 PROCEDURE — 31645 BRNCHSC W/THER ASPIR 1ST: CPT | Performed by: INTERNAL MEDICINE

## 2024-02-02 PROCEDURE — 87070 CULTURE OTHR SPECIMN AEROBIC: CPT | Performed by: INTERNAL MEDICINE

## 2024-02-02 PROCEDURE — 88112 CYTOPATH CELL ENHANCE TECH: CPT | Performed by: PATHOLOGY

## 2024-02-02 PROCEDURE — 89051 BODY FLUID CELL COUNT: CPT | Performed by: INTERNAL MEDICINE

## 2024-02-02 PROCEDURE — 80048 BASIC METABOLIC PNL TOTAL CA: CPT

## 2024-02-02 PROCEDURE — 87102 FUNGUS ISOLATION CULTURE: CPT | Performed by: INTERNAL MEDICINE

## 2024-02-02 PROCEDURE — 87206 SMEAR FLUORESCENT/ACID STAI: CPT | Performed by: INTERNAL MEDICINE

## 2024-02-02 PROCEDURE — 85027 COMPLETE CBC AUTOMATED: CPT

## 2024-02-02 PROCEDURE — 87205 SMEAR GRAM STAIN: CPT | Performed by: INTERNAL MEDICINE

## 2024-02-02 PROCEDURE — 88184 FLOWCYTOMETRY/ TC 1 MARKER: CPT | Performed by: INTERNAL MEDICINE

## 2024-02-02 PROCEDURE — 84100 ASSAY OF PHOSPHORUS: CPT

## 2024-02-02 PROCEDURE — 82948 REAGENT STRIP/BLOOD GLUCOSE: CPT

## 2024-02-02 PROCEDURE — 87798 DETECT AGENT NOS DNA AMP: CPT | Performed by: INTERNAL MEDICINE

## 2024-02-02 RX ORDER — MINOCYCLINE HYDROCHLORIDE 100 MG/1
200 CAPSULE ORAL EVERY 12 HOURS SCHEDULED
Status: DISCONTINUED | OUTPATIENT
Start: 2024-02-02 | End: 2024-02-02

## 2024-02-02 RX ORDER — ECHINACEA PURPUREA EXTRACT 125 MG
1 TABLET ORAL
Status: DISCONTINUED | OUTPATIENT
Start: 2024-02-02 | End: 2024-02-13

## 2024-02-02 RX ORDER — CALCIUM CARBONATE 500 MG/1
500 TABLET, CHEWABLE ORAL 2 TIMES DAILY PRN
Status: DISCONTINUED | OUTPATIENT
Start: 2024-02-02 | End: 2024-02-20

## 2024-02-02 RX ORDER — SODIUM CHLORIDE 9 MG/ML
INJECTION, SOLUTION INTRAVENOUS CONTINUOUS PRN
Status: DISCONTINUED | OUTPATIENT
Start: 2024-02-02 | End: 2024-02-02

## 2024-02-02 RX ORDER — PROPOFOL 10 MG/ML
INJECTION, EMULSION INTRAVENOUS AS NEEDED
Status: DISCONTINUED | OUTPATIENT
Start: 2024-02-02 | End: 2024-02-02

## 2024-02-02 RX ORDER — SULFAMETHOXAZOLE AND TRIMETHOPRIM 800; 160 MG/1; MG/1
2 TABLET ORAL EVERY 12 HOURS SCHEDULED
Status: DISCONTINUED | OUTPATIENT
Start: 2024-02-02 | End: 2024-02-02

## 2024-02-02 RX ORDER — INSULIN LISPRO 100 [IU]/ML
1-5 INJECTION, SOLUTION INTRAVENOUS; SUBCUTANEOUS
Status: DISCONTINUED | OUTPATIENT
Start: 2024-02-02 | End: 2024-02-13

## 2024-02-02 RX ORDER — PREDNISONE 20 MG/1
60 TABLET ORAL DAILY
Status: DISCONTINUED | OUTPATIENT
Start: 2024-02-03 | End: 2024-02-05

## 2024-02-02 RX ADMIN — NYSTATIN 500000 UNITS: 100000 SUSPENSION ORAL at 11:17

## 2024-02-02 RX ADMIN — PREDNISONE 60 MG: 20 TABLET ORAL at 11:19

## 2024-02-02 RX ADMIN — NYSTATIN 500000 UNITS: 100000 SUSPENSION ORAL at 18:43

## 2024-02-02 RX ADMIN — SENNOSIDES, DOCUSATE SODIUM 2 TABLET: 8.6; 5 TABLET ORAL at 18:44

## 2024-02-02 RX ADMIN — Medication 1 SPRAY: at 18:45

## 2024-02-02 RX ADMIN — INSULIN LISPRO 1 UNITS: 100 INJECTION, SOLUTION INTRAVENOUS; SUBCUTANEOUS at 18:45

## 2024-02-02 RX ADMIN — ENOXAPARIN SODIUM 40 MG: 40 INJECTION SUBCUTANEOUS at 11:17

## 2024-02-02 RX ADMIN — NYSTATIN 500000 UNITS: 100000 SUSPENSION ORAL at 07:48

## 2024-02-02 RX ADMIN — SENNOSIDES, DOCUSATE SODIUM 2 TABLET: 8.6; 5 TABLET ORAL at 11:19

## 2024-02-02 RX ADMIN — PROPOFOL 10 MG: 10 INJECTION, EMULSION INTRAVENOUS at 09:21

## 2024-02-02 RX ADMIN — LAMOTRIGINE 150 MG: 100 TABLET ORAL at 21:38

## 2024-02-02 RX ADMIN — QUETIAPINE 25 MG: 25 TABLET ORAL at 21:38

## 2024-02-02 RX ADMIN — INSULIN LISPRO 1 UNITS: 100 INJECTION, SOLUTION INTRAVENOUS; SUBCUTANEOUS at 21:38

## 2024-02-02 RX ADMIN — PROPOFOL 10 MG: 10 INJECTION, EMULSION INTRAVENOUS at 09:19

## 2024-02-02 RX ADMIN — TOPIRAMATE 100 MG: 100 TABLET, FILM COATED ORAL at 11:23

## 2024-02-02 RX ADMIN — CHLORHEXIDINE GLUCONATE 15 ML: 1.2 SOLUTION ORAL at 21:38

## 2024-02-02 RX ADMIN — TOPIRAMATE 100 MG: 100 TABLET, FILM COATED ORAL at 18:43

## 2024-02-02 RX ADMIN — LAMOTRIGINE 150 MG: 100 TABLET ORAL at 11:22

## 2024-02-02 RX ADMIN — CHLORHEXIDINE GLUCONATE 15 ML: 1.2 SOLUTION ORAL at 11:17

## 2024-02-02 RX ADMIN — VENLAFAXINE 25 MG: 25 TABLET ORAL at 07:48

## 2024-02-02 RX ADMIN — MELATONIN 6 MG: at 21:38

## 2024-02-02 RX ADMIN — VENLAFAXINE 25 MG: 25 TABLET ORAL at 11:22

## 2024-02-02 RX ADMIN — PROPOFOL 10 MG: 10 INJECTION, EMULSION INTRAVENOUS at 09:20

## 2024-02-02 RX ADMIN — CALCIUM CARBONATE (ANTACID) CHEW TAB 500 MG 500 MG: 500 CHEW TAB at 18:43

## 2024-02-02 RX ADMIN — NYSTATIN 500000 UNITS: 100000 SUSPENSION ORAL at 16:04

## 2024-02-02 RX ADMIN — PROPOFOL 10 MG: 10 INJECTION, EMULSION INTRAVENOUS at 09:27

## 2024-02-02 RX ADMIN — SODIUM CHLORIDE: 0.9 INJECTION, SOLUTION INTRAVENOUS at 09:12

## 2024-02-02 RX ADMIN — PROPOFOL 10 MG: 10 INJECTION, EMULSION INTRAVENOUS at 09:24

## 2024-02-02 RX ADMIN — VENLAFAXINE 25 MG: 25 TABLET ORAL at 16:04

## 2024-02-02 NOTE — UTILIZATION REVIEW
Continued Stay Review    Date: 02/01/2024,      02/02/2024                          Current Patient Class: Inpatient  Current Level of Care: Critical Care    HPI:58 y.o. 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  Cardio-Pulmonary monitoring.  Remains on high flow NC  FiO2 50-55%.   CRP is trending down.  Wean O2 as tolerated.  Continue bactrim for total 5 days (5/5).   Monitor Temperature / WBC.  PO Prednisone.      02/02/2024 States breathing is OK.  Completed Abx.  Follow up Bronch results.  Repeat CT Chest.  Continue Accuchecks with ISS.  PT/OT.    Continue HFNC: FiO2 60%, 40 lpm.      02/02/2024  Bronchoscopy:  IMPRESSION:  Acute respiratory failure with hypoxia  Pneumonia vs pneumonitis   Erythematous, edematous bronchial mucosa   RECOMMENDATION:    Follow up bronchoscopy   Send & Follow up studies  Post anesthesia care  Return to ICCU      Vital Signs:   Date/Time Temp Pulse Resp BP MAP (mmHg) SpO2 FiO2 (%) Calculated FIO2 (%) - Nasal Cannula O2 Flow Rate (L/min) Nasal Cannula O2 Flow Rate (L/min) O2 Device O2 Interface Device Patient Position - Orthostatic VS   02/02/24 1500 -- -- -- 100/68 77 -- -- -- -- -- -- -- Lying   02/02/24 1050 98.8 °F (37.1 °C) 88 23 Abnormal  103/65 76 95 % -- -- -- -- -- -- --   02/02/24 1017 -- 98 20 111/69 -- 98 % -- -- -- -- High flow nasal cannula -- --   02/02/24 1002 -- 84 20 113/69 -- 97 % -- -- -- -- High flow nasal cannula -- --   02/02/24 0947 -- 90 20 104/65 -- 95 % -- -- -- -- High flow nasal cannula -- --   02/02/24 0942 -- 85 18 107/65 -- 95 % -- -- -- -- High flow nasal cannula -- --   02/02/24 0937 98.1 °F (36.7 °C) 92 24 Abnormal 98/64 -- 92 % -- -- -- --  High flow nasal cannula -- --   02/02/24 0832 96.3 °F (35.7 °C) Abnormal  93 20 109/72 -- 95 % -- -- -- -- High flow nasal cannula -- --   02/02/24 0730 98.5 °F (36.9 °C) 98 28 Abnormal  98/62 75 86 % Abnormal  -- -- -- -- -- -- --   02/02/24 0729 -- -- -- -- -- 93 % -- -- -- -- High flow nasal cannula HFNC prongs --   02/02/24 0348 98.9 °F (37.2 °C) 102 -- 93/72 77 96 % -- -- -- -- -- -- --   02/02/24 0239 -- -- -- -- -- -- -- -- -- -- -- HFNC prongs --   02/02/24 0042 -- 92 -- 97/73 80 96 % -- -- -- -- -- -- --   02/01/24 2332 98.5 °F (36.9 °C) 92 -- 86/57 Abnormal  65 95 % -- -- -- -- -- -- --   02/01/24 2056 99 °F (37.2 °C) 114 Abnormal  20 114/78 93 92 % -- -- -- -- High flow nasal cannula -- Lying   02/01/24 1918 -- -- -- -- -- -- -- -- -- -- -- HFNC prongs --   02/01/24 1700 -- -- -- -- -- -- -- -- -- -- High flow nasal cannula -- --   02/01/24 1545 98.3 °F (36.8 °C) 104 -- 96/70 76 95 % -- -- -- -- -- -- --   02/01/24 1413 -- -- -- -- -- -- -- -- -- -- -- HFNC prongs --   02/01/24 1232 -- -- -- 86/60 Abnormal  -- -- -- -- -- -- -- -- --   02/01/24 1230 98.3 °F (36.8 °C) 114 Abnormal  -- 78/56 Abnormal  61 Abnormal  95 % -- -- -- -- -- -- --   02/01/24 0754 -- -- -- -- -- -- -- -- -- -- -- HFNC prongs --   02/01/24 0745 98.4 °F (36.9 °C) 94 -- 92/66 75 95 % -- -- -- -- -- -- --   02/01/24 0300 98.1 °F (36.7 °C) -- -- -- -- -- -- -- -- -- -- -- --   02/01/24 0258 -- -- -- -- -- -- -- -- -- -- -- HFNC prongs --     Date and Time Eye Opening Best Verbal Response Best Motor Response [place] Coma Scale Score   02/02/24 1200 4 5 6 15   02/02/24 0800 4 5 6 15   02/02/24 0500 4 5 6 15   02/01/24 2330 4 5 6 15   02/01/24 1945 4 5 6 15   02/01/24 1804 4 5 6 15   02/01/24 1645 4 5 6 15   02/01/24 1200 4 5 6 15   02/01/24 0848 4 5 6 15   02/01/24 0430 4 5 6 15   02/01/24 0020 4 5 6 15       Pertinent Labs/Diagnostic Results:     Results from last 7 days   Lab Units 02/02/24  0706 02/01/24  1447 01/30/24  0630  01/29/24  0612 01/28/24  0555   WBC Thousand/uL 6.75 10.98* 6.32 6.93 7.36   HEMOGLOBIN g/dL 13.3 13.7 11.8 11.8 12.8   HEMATOCRIT % 41.9 43.1 36.3 36.5 41.5   PLATELETS Thousands/uL 247 240 188 186 171   BANDS PCT %  --   --   --   --  25*     Results from last 7 days   Lab Units 02/02/24  0706 02/01/24  1447 01/30/24  0630 01/29/24  0612 01/28/24  0759   SODIUM mmol/L 140 136 140 139 140   POTASSIUM mmol/L 4.9 5.0 4.4 3.9 4.2   CHLORIDE mmol/L 109* 105 108 106 106   CO2 mmol/L 20* 21 24 26 25   ANION GAP mmol/L 11 10 8 7 9   BUN mg/dL 44* 53* 35* 34* 27*   CREATININE mg/dL 1.26 1.45* 1.34* 1.26 1.17   EGFR ml/min/1.73sq m 47 40 44 47 51   CALCIUM mg/dL 10.0 10.3* 9.8 9.9 9.7   MAGNESIUM mg/dL 2.0 2.1 2.2 2.2 2.1   PHOSPHORUS mg/dL 4.0 4.5 3.0 3.0  --      Results from last 7 days   Lab Units 02/02/24  1201 02/02/24  0724 02/01/24  2103 02/01/24  1630 02/01/24  1223 02/01/24  0838 02/01/24  0745 01/31/24  2118 01/31/24  1647 01/31/24  1104 01/31/24  0803 01/30/24  2354   POC GLUCOSE mg/dl 88 71 230* 143* 105 134 66 117 125 116 71 123     Results from last 7 days   Lab Units 02/02/24  0706 02/01/24  1447 01/30/24  0630 01/29/24  0612 01/28/24  0759   GLUCOSE RANDOM mg/dL 81 137 75 79 132     Results from last 7 days   Lab Units 01/27/24  1228   PH NICA  7.333   PCO2 NICA mm Hg 48.0   PO2 NICA mm Hg 46.4*   HCO3 NICA mmol/L 24.9   BASE EXC NICA mmol/L -1.3   O2 CONTENT NICA ml/dL 12.9   O2 HGB, VENOUS % 77.0     Results from last 7 days   Lab Units 01/28/24  0759 01/27/24  0510   PROCALCITONIN ng/ml 0.12 0.17     Results from last 7 days   Lab Units 01/31/24  1259   CRP mg/L 5.6*     Results from last 7 days   Lab Units 02/01/24  1859   CLARITY UA  Clear   COLOR UA  Light Yellow   SPEC GRAV UA  1.012   PH UA  6.5   GLUCOSE UA mg/dl Negative   KETONES UA mg/dl Negative   BLOOD UA  Negative   PROTEIN UA mg/dl Negative   NITRITE UA  Negative   BILIRUBIN UA  Negative   UROBILINOGEN UA (BE) mg/dl <2.0   LEUKOCYTES UA  Negative    WBC UA /hpf 2-4*   RBC UA /hpf None Seen   BACTERIA UA /hpf None Seen   EPITHELIAL CELLS WET PREP /hpf Occasional       Results from last 7 days   Lab Units 02/02/24  0941 02/02/24  0940 02/02/24  0938   GRAM STAIN RESULT  1+ Polys  No bacteria seen No Polys or Bacteria seen Rare Polys  No organisms seen     Results from last 7 days   Lab Units 02/02/24  0940 02/02/24  0939   TOTAL COUNTED  100 100     Medications:   Scheduled Medications:  chlorhexidine, 15 mL, Mouth/Throat, Q12H SARTHAK  enoxaparin, 40 mg, Subcutaneous, Daily  insulin lispro, 2-12 Units, Subcutaneous, TID With Meals  insulin lispro, 2-12 Units, Subcutaneous, HS  lamoTRIgine, 150 mg, Oral, BID  melatonin, 6 mg, Oral, HS  nystatin, 500,000 Units, Swish & Spit, 4x Daily  polyethylene glycol, 17 g, Oral, Daily  [START ON 2/3/2024] predniSONE, 50 mg, Oral, Daily   Followed by  [START ON 2/10/2024] predniSONE, 40 mg, Oral, Daily   Followed by  [START ON 2/17/2024] predniSONE, 30 mg, Oral, Daily   Followed by  [START ON 2/24/2024] predniSONE, 20 mg, Oral, Daily   Followed by  [START ON 3/2/2024] predniSONE, 10 mg, Oral, Daily   Followed by  [START ON 3/9/2024] predniSONE, 5 mg, Oral, Daily  QUEtiapine, 25 mg, Oral, HS  senna-docusate sodium, 2 tablet, Oral, BID  topiramate, 100 mg, Oral, BID  venlafaxine, 25 mg, Oral, TID With Meals      Continuous IV Infusions:     PRN Meds:  acetaminophen, 650 mg, Oral, Q6H PRN  bisacodyl, 10 mg, Rectal, Daily PRN  metoprolol, 2.5 mg, Intravenous, Q6H PRN  ondansetron, 4 mg, Intravenous, Q6H PRN  sodium chloride, 1 spray, Each Nare, Q1H PRN        Discharge Plan: D    Network Utilization Review Department  ATTENTION: Please call with any questions or concerns to [phone] and carefully listen to the prompts so that you are directed to the right person. All voicemails are confidential.   For Discharge needs, contact Care Management DC Support Team at [phone] opt. 2  Send all requests for admission clinical  reviews, approved or denied determinations and any other requests to dedicated fax number below belonging to the campus where the patient is receiving treatment. List of dedicated fax numbers for the Facilities:  FACILITY NAME UR FAX NUMBER   ADMISSION DENIALS (Administrative/Medical Necessity) [phone]   DISCHARGE SUPPORT TEAM (NETWORK) [phone]   PARENT CHILD HEALTH (Maternity/NICU/Pediatrics) [phone]   [facility] [phone]   [facility] [phone]   Counts include 234 beds at the [facility] [phone]   [facility] [phone]   [facility] [phone]   [facility] [phone]   [facility] [phone]   [facility] [phone]   [facility] [phone]   [facility] [phone]   [facility] [phone]   [facility] [phone]

## 2024-02-02 NOTE — ANESTHESIA POSTPROCEDURE EVALUATION
Post-Op Assessment Note    CV Status:  Stable    Pain management: adequate       Mental Status:  Alert and awake   Hydration Status:  Euvolemic   PONV Controlled:  Controlled   Airway Patency:  Patent  Two or more mitigation strategies used for obstructive sleep apnea   Post Op Vitals Reviewed: Yes    No anethesia notable event occurred.    Staff: Anesthesiologist, CRNA               BP      Temp      Pulse     Resp      SpO2

## 2024-02-02 NOTE — ANESTHESIA PREPROCEDURE EVALUATION
Procedure:  BRONCHOSCOPY    Relevant Problems   CARDIO   (+) Hypertensive disorder      ENDO   (+) Disorder of parathyroid gland (HCC)      /RENAL   (+) Nephrolithiasis   (+) Stage 3b chronic kidney disease (CKD) (HCC)      HEMATOLOGY   (+) Teto marginal zone B-cell lymphoma (HCC)      NEURO/PSYCH   (+) Anxiety state   (+) Grand mal status (HCC)   (+) Other specified anxiety disorders   (+) Reactive depression   (+) Seizure disorder (HCC)   (+) Subjective visual disturbance      PULMONARY   (+) Acute respiratory failure with hypoxia ([facility])   (+) Viral pneumonia        Physical Exam    Airway       Dental       Cardiovascular      Pulmonary      Other Findings  post-pubertal.      Anesthesia Plan  ASA Score- 4     Anesthesia Type- IV sedation with anesthesia with ASA Monitors.         Additional Monitors:     Airway Plan:     Comment: Will attempt minimum sedation.       Plan Factors-    Chart reviewed.    Patient summary reviewed.                  Induction- intravenous.    Postoperative Plan-     Informed Consent- Anesthetic plan and risks discussed with patient.  I personally reviewed this patient with the CRNA. Discussed and agreed on the Anesthesia Plan with the CRNA..

## 2024-02-02 NOTE — CASE MANAGEMENT
Case Management Discharge Planning Note    Patient name Carla Hunt  Location [facility]-01 MRN [id number]  : 1965 Date 2024       Current Admission Date: 1/10/2024  Current Admission Diagnosis:Acute respiratory failure with hypoxia (HCC)   Patient Active Problem List    Diagnosis Date Noted    Viral pneumonia 2024    Seizure disorder (HCC) 2024    Sepsis (HCC) 2024    Acute respiratory failure with hypoxia (HCC) 2024    Transaminitis 2024    Teto marginal zone B-cell lymphoma (HCC) 2024    COVID 2024    Stage 3b chronic kidney disease (CKD) ([facility]) 2024    Abnormal CT of the head 2024    Nephrolithiasis 2021    Other specified anxiety disorders 2019    Reactive depression 2019    Hidradenitis suppurativa of left axilla 2019    Anxiety state 2018    Luetscher's syndrome 2018    Disorder of parathyroid gland ([facility]) 2018    Eczema 2018    Gastroenteritis 2018    Grand mal status ([facility]) 2018    Hypercalcemia 2018    Hypertensive disorder 2018    Impacted cerumen 2018    Open wound of hand except fingers 2018    Otitis externa 2018    Overweight 2018    Pain in face 2018    Skin sensation disturbance 2018    Subjective visual disturbance 2018    Encounter for gynecological examination (general) (routine) without abnormal findings 10/23/2018    Long term current use of hormonal contraceptive 10/23/2018    Rosacea 2015      LOS (days): 23  Geometric Mean LOS (GMLOS) (days):   Days to GMLOS:     OBJECTIVE:  Risk of Unplanned Readmission Score: 26.01         Current admission status: Inpatient   Preferred Pharmacy:   [id number] [place] [facility] - [address]  [place] [place] [address]  Phone: [phone] Fax: [phone]    [facility] - [address]  [address] [place] [address]  Phone: [phone] Fax: [phone]    Primary Care Provider: Tony Guevara MD    Primary Insurance: MEDICARE  Secondary Insurance: [employer]    DISCHARGE DETAILS:    Patient not medically cleared at this time. Patient continued on high flow oxygen. Patient had bronchoscopy today and pending results.

## 2024-02-02 NOTE — PROGRESS NOTES
Spiritual Care Progress Note    2024  Patient: Carla Hunt : 1965  Admission Date & Time: 1/10/2024 1941  MRN: [id number] [facility]: [id number]      Fr Daugherty met with the pt and conducted a pastoral visit. The pt declined Fr's offers for prayers, blessings and anointing. No further needs were expressed at this time.    Chaplains still remain available.       24 0900   Clinical Encounter Type   Visited With Patient   Sacramental Encounters   Sacrament of Sick-Anointing Patient declined anointing

## 2024-02-02 NOTE — PLAN OF CARE
Problem: Prexisting or High Potential for Compromised Skin Integrity  Goal: Skin integrity is maintained or improved  Description: INTERVENTIONS:  - Identify patients at risk for skin breakdown  - Assess and monitor skin integrity  - Assess and monitor nutrition and hydration status  - Monitor labs   - Assess for incontinence   - Turn and reposition patient  - Assist with mobility/ambulation  - Relieve pressure over bony prominences  - Avoid friction and shearing  - Provide appropriate hygiene as needed including keeping skin clean and dry  - Evaluate need for skin moisturizer/barrier cream  - Collaborate with interdisciplinary team   - Patient/family teaching  - Consider wound care consult   Outcome: Progressing     Problem: Nutrition/Hydration-ADULT  Goal: Nutrient/Hydration intake appropriate for improving, restoring or maintaining nutritional needs  Description: Monitor and assess patient's nutrition/hydration status for malnutrition. Collaborate with interdisciplinary team and initiate plan and interventions as ordered.  Monitor patient's weight and dietary intake as ordered or per policy. Utilize nutrition screening tool and intervene as necessary. Determine patient's food preferences and provide high-protein, high-caloric foods as appropriate.     INTERVENTIONS:  - Monitor oral intake, urinary output, labs, and treatment plans  - Assess nutrition and hydration status and recommend course of action  - Evaluate amount of meals eaten  - Assist patient with eating if necessary   - Allow adequate time for meals  - Recommend/ encourage appropriate diets, oral nutritional supplements, and vitamin/mineral supplements  - Order, calculate, and assess calorie counts as needed  - Recommend, monitor, and adjust tube feedings and TPN/PPN based on assessed needs  - Assess need for intravenous fluids  - Provide specific nutrition/hydration education as appropriate  - Include patient/family/caregiver in decisions related to  nutrition  Outcome: Progressing     Problem: SAFETY ADULT  Goal: Patient will remain free of falls  Description: INTERVENTIONS:  - Educate patient/family on patient safety including physical limitations  - Instruct patient to call for assistance with activity   - Consult OT/PT to assist with strengthening/mobility   - Keep Call bell within reach  - Keep bed low and locked with side rails adjusted as appropriate  - Keep care items and personal belongings within reach  - Initiate and maintain comfort rounds  - Make Fall Risk Sign visible to staff  - Offer Toileting every 2 Hours, in advance of need  - Initiate/Maintain bed alarm  - Obtain necessary fall risk management equipment: non skid footwear  - Apply yellow socks and bracelet for high fall risk patients  - Consider moving patient to room near nurses station  Outcome: Progressing     Problem: Potential for Falls  Goal: Patient will remain free of falls  Description: INTERVENTIONS:  - Educate patient/family on patient safety including physical limitations  - Instruct patient to call for assistance with activity   - Consult OT/PT to assist with strengthening/mobility   - Keep Call bell within reach  - Keep bed low and locked with side rails adjusted as appropriate  - Keep care items and personal belongings within reach  - Initiate and maintain comfort rounds  - Make Fall Risk Sign visible to staff  - Offer Toileting every 2 Hours, in advance of need  - Initiate/Maintain bed alarm  - Obtain necessary fall risk management equipment: non skid footwear  - Apply yellow socks and bracelet for high fall risk patients  - Consider moving patient to room near nurses station  Outcome: Progressing

## 2024-02-02 NOTE — PROGRESS NOTES
[facility]  Progress Note: Critical Care  Name: Carla Hunt 58 y.o. female I MRN: [id number]  Unit/Bed#: [facility] 207-01 I Date of Admission: 1/10/2024   Date of Service: 2/2/2024 I Hospital Day: 23    Assessment/Plan   Neuro:   Diagnosis: seizure disorder  S/p partial left temporal lobe resection in 2007  Contuinue home lamictal 150 bid and topamax 100 bid  Diagnosis: anxiety  Continue home effexor  Seroquel 25 hs and melatonin 6 hs for sleep     CV:   No active issues     Pulm:  Diagnosis: acute hypoxic respiratory failure   Tested COVID positive on 1/7  Completed severe COVID pathway and 7 days CTX  Initially doing well with decreasing O2 requirements after transfer out of ICU, but pulm consulted 1/26 for increasing O2 requirements as of 1/24  Steroids restarted for taper due to abrupt cessation upon completion of COVID pathway   Morning of 1/28 pt requiring HFNC 100% so was upgraded to SD1 under critical care, downgraded to SD2 on 1/30 but continued to have high O2 requirements so was upgraded to CC again 2/1  Imaging findings with diffuse GGO and multifocal consolidations as well as bronchial dilation - concern that this could represent fibrosis 2/2 rituxan, diffuse pattern not as consistent with post-COVID fibrosis   Current O2 requirements: HFNC 40L 60%  Continue to wean supplemental O2 as tolerated for SpO2 >90%  Plan for bronchoscopy today pending availability   Slow prednisone taper ordered, will consider increasing steroids today pending bronchoscopy findings  CRP downtrending   See remainder of plan under ID     GI:   No active issues  Bowel regimen with daily miralax and bid senna      :   Diagnosis: CKD III  Baseline Cr appears to be 0.8-1.2  ELIESER on arrival which resolved  Given 1L fluid bolus yesterday given continued rise in Cr with improvement in Cr today suggesting dehydration as cause  UA unremarkable   Upon chart review pt has reported h/o Nona  syndrome (hyperaldosteronism) though unclear how this was diagnosed, this also does not enirely fit as she is hypokalemic  Continue to monitor I/O and UOP     F/E/N:   F: none   E: monitor and replete for goal K>4, P>3, Mg>2  N: regular house      Heme/Onc:   Diagnosis: B cell lymphoma  Follows with heme[facility]onc at [facility]  On rituxan for 2 year course, started May 2022  Last dose was 12/20, treatment currently on hold   Leukopenic on admission but WBC now wnl  DVT ppx with lovenox      Endo:   Diagnosis: steroid hyperglycemia   SSI, hypoglycemic protocol  Goal -180     ID:   Diagnosis: COVID pneumonia vs tracheobronchitis  Completed severe COVID pathway with decadron (ended 1/22) and remdesivir (ended 1/20), also completed 7 days CXT on 1/15  C/f opportunistic infections given immunocompromised status on chemotherapy and recent steroid use  No consolidations on CXR and procal negative  Slow prednisone taper as in pulm plan  Abx reinitiated with minocycline 200 bid and bactrim 800-160 bid when admitted to CC due to acuity of condition, will finish 5 day course today  ID following, appreciate recommendations      MSK/Skin:   No active issues    Disposition: Stepdown Level 1    ICU Core Measures     A: Assess, Prevent, and Manage Pain Has pain been assessed? Yes  Need for changes to pain regimen? No   B: Both SAT/SAT  N/A   C: Choice of Sedation RASS Goal: 0 Alert and Calm  Need for changes to sedation or analgesia regimen? No   D: Delirium CAM-ICU: Negative   E: Early Mobility  Plan for early mobility? Yes   F: Family Engagement Plan for family engagement today? Yes       Antibiotic Review: Antibiotics to be discontinued today    Review of Invasive Devices:            Prophylaxis:  VTE VTE covered by:  enoxaparin, Subcutaneous, 40 mg at 02/01/24 1026       Stress Ulcer  not ordered        Significant 24hr Events     24hr events: No events overnight. Pt resting comfortably in bed this morning. States her breathing  feels okay.      Subjective     Review of Systems   Constitutional:  Negative for chills and fever.   Respiratory:  Negative for shortness of breath.    Cardiovascular:  Negative for chest pain.   Gastrointestinal:  Negative for abdominal pain.        Objective                            Vitals I/O      Most Recent Min/Max in 24hrs   Temp 98.9 °F (37.2 °C) Temp  Min: 98.3 °F (36.8 °C)  Max: 99 °F (37.2 °C)   Pulse 102 Pulse  Min: 92  Max: 114   Resp 20 Resp  Min: 20  Max: 20   BP 93/72 BP  Min: 78/56  Max: 114/78   O2 Sat 96 % SpO2  Min: 92 %  Max: 96 %      Intake/Output Summary (Last 24 hours) at 2/2/2024 0720  Last data filed at 2/2/2024 0051  Gross per 24 hour   Intake 360 ml   Output 1330 ml   Net -970 ml       Diet NPO; Sips with meds    Invasive Monitoring   None         Physical Exam   Physical Exam  Eyes:      General:         Right eye: No discharge.         Left eye: No discharge.      Conjunctiva/sclera: Conjunctivae normal.   Skin:     General: Skin is warm and dry.   HENT:      Head: Normocephalic and atraumatic.      Right Ear: No drainage.      Left Ear: No drainage.      Nose: Epistaxis present. No nasogastric tube.      Mouth/Throat:      Mouth: Mucous membranes are moist.   Cardiovascular:      Rate and Rhythm: Normal rate and regular rhythm.      Heart sounds: Normal heart sounds.   Musculoskeletal:      Right lower leg: No edema.      Left lower leg: No edema.   Abdominal: General: Bowel sounds are normal. There is no distension.      Palpations: Abdomen is soft.      Tenderness: There is no abdominal tenderness. There is no guarding.   Constitutional:       General: She is not in acute distress.     Interventions: She is not sedated and not intubated.  Pulmonary:      Effort: Pulmonary effort is normal. No respiratory distress. She is not intubated.      Breath sounds: Rales (bilateral bases) present.   Neurological:      Mental Status: She is alert and oriented to person, place and time. She  is calm.   Genitourinary/Anorectal:  external catheter present.          Diagnostic Studies      EKG: no new  Imaging: no new      Medications:  Scheduled PRN   chlorhexidine, 15 mL, Q12H SARTHAK  enoxaparin, 40 mg, Daily  insulin lispro, 2-12 Units, TID With Meals  insulin lispro, 2-12 Units, HS  lamoTRIgine, 150 mg, BID  melatonin, 6 mg, HS  nystatin, 500,000 Units, 4x Daily  polyethylene glycol, 17 g, Daily  predniSONE, 60 mg, Daily   Followed by  [START ON 2/3/2024] predniSONE, 50 mg, Daily   Followed by  [START ON 2/10/2024] predniSONE, 40 mg, Daily   Followed by  [START ON 2/17/2024] predniSONE, 30 mg, Daily   Followed by  [START ON 2/24/2024] predniSONE, 20 mg, Daily   Followed by  [START ON 3/2/2024] predniSONE, 10 mg, Daily   Followed by  [START ON 3/9/2024] predniSONE, 5 mg, Daily  QUEtiapine, 25 mg, HS  senna-docusate sodium, 2 tablet, BID  topiramate, 100 mg, BID  venlafaxine, 25 mg, TID With Meals      acetaminophen, 650 mg, Q6H PRN  bisacodyl, 10 mg, Daily PRN  metoprolol, 2.5 mg, Q6H PRN  ondansetron, 4 mg, Q6H PRN       Continuous          Labs:    CBC    Recent Labs     02/01/24  1447   WBC 10.98*   HGB 13.7   HCT 43.1        BMP    Recent Labs     02/01/24  1447   SODIUM 136   K 5.0      CO2 21   AGAP 10   BUN 53*   CREATININE 1.45*   CALCIUM 10.3*       Coags    No recent results     Additional Electrolytes  Recent Labs     02/01/24  1447   MG 2.1   PHOS 4.5          Blood Gas    No recent results  No recent results LFTs  No recent results    Infectious  No recent results  Glucose  Recent Labs     02/01/24  1447   GLUC 137                   Risa Handley MD

## 2024-02-02 NOTE — OCCUPATIONAL THERAPY NOTE
Occupational Therapy CX        Patient Name: Carla Hunt  Today's Date: 2/2/2024 02/02/24 1017   OT Last Visit   OT Visit Date 02/02/24   Note Type   Note Type Treatment   Cancel Reasons Patient off floor/test  (Pt off the floor for bronchscopy. Will follow and see as able.)         Tara Diaz MS, OTR/L

## 2024-02-02 NOTE — RESPIRATORY THERAPY NOTE
Resp care   02/02/24 1604   Respiratory Assessment   Resp Comments pt found on 100%/ 50lpm with sat of 99%. 02 decreased to 70%.will titrate for sat of 90%   Non-Invasive Information   O2 Interface Device HFNC prongs   Non-Invasive Ventilation Mode HFNC (High flow)   Non-Invasive Settings   Flow (lpm) 50   FiO2 (%) 70   Temperature (Set) 31   Non-Invasive Readings   Heater Temperature (Obs) 31

## 2024-02-03 ENCOUNTER — APPOINTMENT (INPATIENT)
Dept: RADIOLOGY | Facility: HOSPITAL | Age: 59
DRG: 003 | End: 2024-02-03
Payer: COMMERCIAL

## 2024-02-03 LAB
ANION GAP SERPL CALCULATED.3IONS-SCNC: 10 MMOL/L
BUN SERPL-MCNC: 47 MG/DL (ref 5–25)
CALCIUM SERPL-MCNC: 10.1 MG/DL (ref 8.4–10.2)
CHLORIDE SERPL-SCNC: 109 MMOL/L (ref 96–108)
CO2 SERPL-SCNC: 18 MMOL/L (ref 21–32)
CREAT SERPL-MCNC: 1.35 MG/DL (ref 0.6–1.3)
ERYTHROCYTE [DISTWIDTH] IN BLOOD BY AUTOMATED COUNT: 18.8 % (ref 11.6–15.1)
GFR SERPL CREATININE-BSD FRML MDRD: 43 ML/MIN/1.73SQ M
GLUCOSE SERPL-MCNC: 172 MG/DL (ref 65–140)
GLUCOSE SERPL-MCNC: 173 MG/DL (ref 65–140)
GLUCOSE SERPL-MCNC: 73 MG/DL (ref 65–140)
GLUCOSE SERPL-MCNC: 93 MG/DL (ref 65–140)
GLUCOSE SERPL-MCNC: 96 MG/DL (ref 65–140)
HCT VFR BLD AUTO: 41.9 % (ref 34.8–46.1)
HGB BLD-MCNC: 13.6 G/DL (ref 11.5–15.4)
MAGNESIUM SERPL-MCNC: 2 MG/DL (ref 1.9–2.7)
MCH RBC QN AUTO: 30 PG (ref 26.8–34.3)
MCHC RBC AUTO-ENTMCNC: 32.5 G/DL (ref 31.4–37.4)
MCV RBC AUTO: 93 FL (ref 82–98)
PHOSPHATE SERPL-MCNC: 3.5 MG/DL (ref 2.7–4.5)
PLATELET # BLD AUTO: 240 THOUSANDS/UL (ref 149–390)
PMV BLD AUTO: 11.6 FL (ref 8.9–12.7)
POTASSIUM SERPL-SCNC: 5 MMOL/L (ref 3.5–5.3)
RBC # BLD AUTO: 4.53 MILLION/UL (ref 3.81–5.12)
RHODAMINE-AURAMINE STN SPEC: NORMAL
RHODAMINE-AURAMINE STN SPEC: NORMAL
SODIUM SERPL-SCNC: 137 MMOL/L (ref 135–147)
WBC # BLD AUTO: 8.31 THOUSAND/UL (ref 4.31–10.16)

## 2024-02-03 PROCEDURE — 85027 COMPLETE CBC AUTOMATED: CPT

## 2024-02-03 PROCEDURE — 71250 CT THORAX DX C-: CPT

## 2024-02-03 PROCEDURE — 84100 ASSAY OF PHOSPHORUS: CPT

## 2024-02-03 PROCEDURE — 93005 ELECTROCARDIOGRAM TRACING: CPT

## 2024-02-03 PROCEDURE — G1004 CDSM NDSC: HCPCS

## 2024-02-03 PROCEDURE — 82948 REAGENT STRIP/BLOOD GLUCOSE: CPT

## 2024-02-03 PROCEDURE — 94760 N-INVAS EAR/PLS OXIMETRY 1: CPT

## 2024-02-03 PROCEDURE — 99233 SBSQ HOSP IP/OBS HIGH 50: CPT | Performed by: INTERNAL MEDICINE

## 2024-02-03 PROCEDURE — 83735 ASSAY OF MAGNESIUM: CPT

## 2024-02-03 PROCEDURE — 80048 BASIC METABOLIC PNL TOTAL CA: CPT

## 2024-02-03 RX ORDER — ALBUMIN, HUMAN INJ 5% 5 %
12.5 SOLUTION INTRAVENOUS ONCE
Status: COMPLETED | OUTPATIENT
Start: 2024-02-03 | End: 2024-02-03

## 2024-02-03 RX ORDER — SODIUM CHLORIDE, SODIUM GLUCONATE, SODIUM ACETATE, POTASSIUM CHLORIDE, MAGNESIUM CHLORIDE, SODIUM PHOSPHATE, DIBASIC, AND POTASSIUM PHOSPHATE .53; .5; .37; .037; .03; .012; .00082 G/100ML; G/100ML; G/100ML; G/100ML; G/100ML; G/100ML; G/100ML
1000 INJECTION, SOLUTION INTRAVENOUS ONCE
Status: COMPLETED | OUTPATIENT
Start: 2024-02-03 | End: 2024-02-03

## 2024-02-03 RX ADMIN — LAMOTRIGINE 150 MG: 100 TABLET ORAL at 21:00

## 2024-02-03 RX ADMIN — NYSTATIN 500000 UNITS: 100000 SUSPENSION ORAL at 05:12

## 2024-02-03 RX ADMIN — AMPICILLIN SODIUM 2000 MG: 2 INJECTION, POWDER, FOR SOLUTION INTRAVENOUS at 17:14

## 2024-02-03 RX ADMIN — VENLAFAXINE 25 MG: 25 TABLET ORAL at 09:46

## 2024-02-03 RX ADMIN — AMPICILLIN SODIUM 2000 MG: 2 INJECTION, POWDER, FOR SOLUTION INTRAVENOUS at 23:40

## 2024-02-03 RX ADMIN — PREDNISONE 60 MG: 20 TABLET ORAL at 09:44

## 2024-02-03 RX ADMIN — CHLORHEXIDINE GLUCONATE 15 ML: 1.2 SOLUTION ORAL at 09:45

## 2024-02-03 RX ADMIN — INSULIN LISPRO 1 UNITS: 100 INJECTION, SOLUTION INTRAVENOUS; SUBCUTANEOUS at 17:19

## 2024-02-03 RX ADMIN — ONDANSETRON 4 MG: 2 INJECTION INTRAMUSCULAR; INTRAVENOUS at 21:09

## 2024-02-03 RX ADMIN — LAMOTRIGINE 150 MG: 100 TABLET ORAL at 09:46

## 2024-02-03 RX ADMIN — TOPIRAMATE 100 MG: 100 TABLET, FILM COATED ORAL at 17:18

## 2024-02-03 RX ADMIN — VENLAFAXINE 25 MG: 25 TABLET ORAL at 15:52

## 2024-02-03 RX ADMIN — SODIUM CHLORIDE, SODIUM GLUCONATE, SODIUM ACETATE, POTASSIUM CHLORIDE, MAGNESIUM CHLORIDE, SODIUM PHOSPHATE, DIBASIC, AND POTASSIUM PHOSPHATE 1000 ML: .53; .5; .37; .037; .03; .012; .00082 INJECTION, SOLUTION INTRAVENOUS at 15:41

## 2024-02-03 RX ADMIN — NYSTATIN 500000 UNITS: 100000 SUSPENSION ORAL at 09:53

## 2024-02-03 RX ADMIN — VENLAFAXINE 25 MG: 25 TABLET ORAL at 13:58

## 2024-02-03 RX ADMIN — CHLORHEXIDINE GLUCONATE 15 ML: 1.2 SOLUTION ORAL at 21:00

## 2024-02-03 RX ADMIN — INSULIN LISPRO 1 UNITS: 100 INJECTION, SOLUTION INTRAVENOUS; SUBCUTANEOUS at 21:02

## 2024-02-03 RX ADMIN — ALBUMIN (HUMAN) 12.5 G: 12.5 INJECTION, SOLUTION INTRAVENOUS at 23:21

## 2024-02-03 RX ADMIN — NYSTATIN 500000 UNITS: 100000 SUSPENSION ORAL at 17:17

## 2024-02-03 RX ADMIN — NYSTATIN 500000 UNITS: 100000 SUSPENSION ORAL at 13:58

## 2024-02-03 RX ADMIN — TOPIRAMATE 100 MG: 100 TABLET, FILM COATED ORAL at 09:46

## 2024-02-03 RX ADMIN — ONDANSETRON 4 MG: 2 INJECTION INTRAMUSCULAR; INTRAVENOUS at 15:45

## 2024-02-03 RX ADMIN — ENOXAPARIN SODIUM 40 MG: 40 INJECTION SUBCUTANEOUS at 09:44

## 2024-02-03 RX ADMIN — MELATONIN 6 MG: at 21:00

## 2024-02-03 RX ADMIN — QUETIAPINE 25 MG: 25 TABLET ORAL at 21:00

## 2024-02-03 NOTE — UTILIZATION REVIEW
Continued Stay Review    Date:2/3/24    - 2/4/2024                     Current Patient Class:  Inpatient  Current Level of Care: Critical care     HPI:58 y.o. 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 No bacteria so far. Continue prednisone.  Continue fluid bolus today - she is net negative 2.5L today.   She continues on HF O2,          Assessment/Plan 2/4/24 -  no acute events overnight. She continues to desat with ambulation, and remains on HFNC. She reports she feels improved.         Vital Signs:   Date/Time Temp Pulse Resp BP MAP (mmHg) SpO2 FiO2 (%) Calculated FIO2 (%) - Nasal Cannula O2 Flow Rate (L/min) Nasal Cannula O2 Flow Rate (L/min) O2 Device O2 Interface Device Patient Position - Orthostatic VS   02/04/24 1120 97.8 °F (36.6 °C) 114 Abnormal  25 Abnormal  93/59 69 96 % -- -- -- -- -- -- --   02/04/24 0803 -- -- -- -- -- -- -- -- -- -- -- HFNC prongs --   02/04/24 0720 98 °F (36.7 °C) 102 22 97/61 73 94 % -- -- -- -- -- -- --   02/04/24 0438 -- -- -- -- -- 95 % -- -- -- -- -- HFNC prongs --   02/04/24 0252 98.1 °F (36.7 °C) 104 20 91/57 66 97 % -- -- -- -- -- -- --   02/04/24 0100 -- 96 20 89/57 Abnormal  65 96 % -- -- -- -- -- -- --   02/03/24 2307 97.9 °F (36.6 °C) 98 18 86/56 Abnormal  66 95 % -- -- -- -- -- -- --   02/03/24 2140 -- -- -- -- -- 92 % -- -- -- -- -- HFNC prongs --   02/03/24 1935 98.9 °F (37.2 °C) 112 Abnormal  24 Abnormal  84/58 Abnormal  68 94 % -- -- -- -- -- -- --   02/03/24 1700 -- 104 24 Abnormal  -- -- 97 % 50 -- 45 L/min -- High flow nasal cannula -- --   02/03/24 1615 -- -- -- -- -- -- 58 -- 45 L/min -- High flow nasal cannula HFNC prongs --     Date/Time Temp  Pulse Resp BP MAP (mmHg) SpO2 FiO2 (%) Calculated FIO2 (%) - Nasal Cannula Nasal Cannula O2 Flow Rate (L/min) O2 Device O2 Interface Device Patient Position - Orthostatic VS   02/03/24 1540 98.7 °F (37.1 °C) 114 Abnormal  19 96/65 75 96 % -- -- -- -- -- --   02/03/24 1220 -- -- -- -- -- -- -- -- -- -- HFNC prongs --   02/03/24 1135 97.9 °F (36.6 °C) 110 Abnormal  24 Abnormal  98/57 71 96 % -- -- -- -- -- --   02/03/24 0821 -- -- -- -- -- -- -- -- -- -- HFNC prongs --   02/03/24 0815 98 °F (36.7 °C) 112 Abnormal  22 92/59 67 92 % -- -- -- -- -- Lying   02/03/24 0700 -- -- -- -- -- -- 60 200 45 L/min -- -- --   02/03/24 0305 98.3 °F (36.8 °C) 94 20 109/72 83 95 % -- -- -- High flow nasal cannula -- Lying   02/03/24 0132 -- -- -- -- -- -- -- -- -- -- HFNC prongs --   02/02/24 2305 98.7 °F (37.1 °C) 106 Abnormal  20 105/69 84 98 % -- -- -- -- -- --   02/02/24 2007 -- -- -- -- -- -- -- -- -- -- HFNC prongs --   02/02/24 1935 98.6 °F (37 °C) 92 21 107/63 75 97 % -- -- -- -- -- --   02/02/24 1604 -- -- -- -- -- -- -- -- -- -- HFNC prongs --   02/02/24 1500 -- -- -- 100/68 77 -- -- -- -- -- -- Lying       Pertinent Labs/Diagnostic Results:       Results from last 7 days   Lab Units 02/03/24 0446 02/02/24  0706 02/01/24  1447 01/30/24  0630 01/29/24  0612 01/28/24  0555   WBC Thousand/uL 8.31 6.75 10.98* 6.32 6.93 7.36   HEMOGLOBIN g/dL 13.6 13.3 13.7 11.8 11.8 12.8   HEMATOCRIT % 41.9 41.9 43.1 36.3 36.5 41.5   PLATELETS Thousands/uL 240 247 240 188 186 171   BANDS PCT %  --   --   --   --   --  25*         Results from last 7 days   Lab Units 02/03/24 0446 02/02/24  0706 02/01/24  1447 01/30/24  0630 01/29/24  0612   SODIUM mmol/L 137 140 136 140 139   POTASSIUM mmol/L 5.0 4.9 5.0 4.4 3.9   CHLORIDE mmol/L 109* 109* 105 108 106   CO2 mmol/L 18* 20* 21 24 26   ANION GAP mmol/L 10 11 10 8 7   BUN mg/dL 47* 44* 53* 35* 34*   CREATININE mg/dL 1.35* 1.26 1.45* 1.34* 1.26   EGFR ml/min/1.73sq m 43 47 40 44 47   CALCIUM mg/dL  10.1 10.0 10.3* 9.8 9.9   MAGNESIUM mg/dL 2.0 2.0 2.1 2.2 2.2   PHOSPHORUS mg/dL 3.5 4.0 4.5 3.0 3.0         Results from last 7 days   Lab Units 02/03/24  1053 02/03/24  0815 02/02/24  2118 02/02/24  1611 02/02/24  1201 02/02/24  0724 02/01/24  2103 02/01/24  1630 02/01/24  1223 02/01/24  0838 02/01/24  0745 01/31/24  2118   POC GLUCOSE mg/dl 96 73 173* 155* 88 71 230* 143* 105 134 66 117     Results from last 7 days   Lab Units 02/03/24  0446 02/02/24  0706 02/01/24  1447 01/30/24  0630 01/29/24  0612 01/28/24  0759   GLUCOSE RANDOM mg/dL 93 81 137 75 79 132       Results from last 7 days   Lab Units 01/28/24  0759   PROCALCITONIN ng/ml 0.12       Results from last 7 days   Lab Units 01/31/24  1259   CRP mg/L 5.6*             Results from last 7 days   Lab Units 02/01/24  1859   CLARITY UA  Clear   COLOR UA  Light Yellow   SPEC GRAV UA  1.012   PH UA  6.5   GLUCOSE UA mg/dl Negative   KETONES UA mg/dl Negative   BLOOD UA  Negative   PROTEIN UA mg/dl Negative   NITRITE UA  Negative   BILIRUBIN UA  Negative   UROBILINOGEN UA (BE) mg/dl <2.0   LEUKOCYTES UA  Negative   WBC UA /hpf 2-4*   RBC UA /hpf None Seen   BACTERIA UA /hpf None Seen   EPITHELIAL CELLS WET PREP /hpf Occasional       Results from last 7 days   Lab Units 02/02/24  0941 02/02/24  0940 02/02/24  0938   GRAM STAIN RESULT  1+ Polys  No bacteria seen No Polys or Bacteria seen Rare Polys  No organisms seen     Results from last 7 days   Lab Units 02/02/24  0940 02/02/24  0939   TOTAL COUNTED  100 100               Medications:   Scheduled Medications:  ampicillin, 2,000 mg, Intravenous, Q6H  chlorhexidine, 15 mL, Mouth/Throat, Q12H SARTHAK  enoxaparin, 40 mg, Subcutaneous, Daily  insulin lispro, 1-5 Units, Subcutaneous, 4x Daily (AC & HS)  lamoTRIgine, 150 mg, Oral, BID  melatonin, 6 mg, Oral, HS  multi-electrolyte, 1,000 mL, Intravenous, Once  nystatin, 500,000 Units, Swish & Spit, 4x Daily  polyethylene glycol, 17 g, Oral, Daily  predniSONE, 60 mg, Oral,  Daily  QUEtiapine, 25 mg, Oral, HS  senna-docusate sodium, 2 tablet, Oral, BID  topiramate, 100 mg, Oral, BID  venlafaxine, 25 mg, Oral, TID With Meals      Continuous IV Infusions:     PRN Meds:  acetaminophen, 650 mg, Oral, Q6H PRN  bisacodyl, 10 mg, Rectal, Daily PRN  calcium carbonate, 500 mg, Oral, BID PRN  metoprolol, 2.5 mg, Intravenous, Q6H PRN  ondansetron, 4 mg, Intravenous, Q6H PRN  sodium chloride, 1 spray, Each Nare, Q1H PRN        Discharge Plan: TBD     Network Utilization Review Department  ATTENTION: Please call with any questions or concerns to [phone] and carefully listen to the prompts so that you are directed to the right person. All voicemails are confidential.   For Discharge needs, contact Care Management DC Support Team at [phone] opt. [phone]  Send all requests for admission clinical reviews, approved or denied determinations and any other requests to dedicated fax number below belonging to the [facility] where the patient is receiving treatment. List of dedicated fax numbers for the Facilities:  FACILITY NAME UR FAX NUMBER   ADMISSION DENIALS (Administrative/Medical Necessity) [phone]   DISCHARGE SUPPORT TEAM (NETWORK) [phone]   PARENT CHILD HEALTH (Maternity/NICU/Pediatrics) [phone]   [facility] [phone]   [facility] [phone]   [facility] [phone]   [facility] [phone]   [facility] [phone]   [facility] [phone]   [facility] [phone]   [facility] [phone]   [facility] [phone]   [facility] [phone]   [facility] [phone]   [facility] [phone]

## 2024-02-03 NOTE — PLAN OF CARE
Problem: Nutrition/Hydration-ADULT  Goal: Nutrient/Hydration intake appropriate for improving, restoring or maintaining nutritional needs  Description: Monitor and assess patient's nutrition/hydration status for malnutrition. Collaborate with interdisciplinary team and initiate plan and interventions as ordered.  Monitor patient's weight and dietary intake as ordered or per policy. Utilize nutrition screening tool and intervene as necessary. Determine patient's food preferences and provide high-protein, high-caloric foods as appropriate.     INTERVENTIONS:  - Monitor oral intake, urinary output, labs, and treatment plans  - Assess nutrition and hydration status and recommend course of action  - Evaluate amount of meals eaten  - Assist patient with eating if necessary   - Allow adequate time for meals  - Recommend/ encourage appropriate diets, oral nutritional supplements, and vitamin/mineral supplements  - Order, calculate, and assess calorie counts as needed  - Recommend, monitor, and adjust tube feedings and TPN/PPN based on assessed needs  - Assess need for intravenous fluids  - Provide specific nutrition/hydration education as appropriate  - Include patient/family/caregiver in decisions related to nutrition  Outcome: Progressing     Problem: PAIN - ADULT  Goal: Verbalizes/displays adequate comfort level or baseline comfort level  Description: Interventions:  - Encourage patient to monitor pain and request assistance  - Assess pain using appropriate pain scale  - Administer analgesics based on type and severity of pain and evaluate response  - Implement non-pharmacological measures as appropriate and evaluate response  - Consider cultural and social influences on pain and pain management  - Notify physician/advanced practitioner if interventions unsuccessful or patient reports new pain  Outcome: Progressing     Problem: INFECTION - ADULT  Goal: Absence or prevention of progression during  hospitalization  Description: INTERVENTIONS:  - Assess and monitor for signs and symptoms of infection  - Monitor lab/diagnostic results  - Monitor all insertion sites, i.e. indwelling lines, tubes, and drains  - Monitor endotracheal if appropriate and nasal secretions for changes in amount and color  - [place] appropriate cooling/warming therapies per order  - Administer medications as ordered  - Instruct and encourage patient and family to use good hand hygiene technique  - Identify and instruct in appropriate isolation precautions for identified infection/condition  Outcome: Progressing     Problem: SAFETY ADULT  Goal: Patient will remain free of falls  Description: INTERVENTIONS:  - Educate patient/family on patient safety including physical limitations  - Instruct patient to call for assistance with activity   - Consult OT/PT to assist with strengthening/mobility   - Keep Call bell within reach  - Keep bed low and locked with side rails adjusted as appropriate  - Keep care items and personal belongings within reach  - Initiate and maintain comfort rounds  - Make Fall Risk Sign visible to staff  - Offer Toileting every 2 Hours, in advance of need  - Initiate/Maintain bed alarm  - Obtain necessary fall risk management equipment: non skid footwear  - Apply yellow socks and bracelet for high fall risk patients  - Consider moving patient to room near nurses station  Outcome: Progressing  Goal: Maintain or return to baseline ADL function  Description: INTERVENTIONS:  -  Assess patient's ability to carry out ADLs; assess patient's baseline for ADL function and identify physical deficits which impact ability to perform ADLs (bathing, care of mouth/teeth, toileting, grooming, dressing, etc.)  - Assess/evaluate cause of self-care deficits   - Assess range of motion  - Assess patient's mobility; develop plan if impaired  - Assess patient's need for assistive devices and provide as appropriate  - Encourage maximum  independence but intervene and supervise when necessary  - Involve family in performance of ADLs  - Assess for home care needs following discharge   - Consider OT consult to assist with ADL evaluation and planning for discharge  - Provide patient education as appropriate  Outcome: Progressing  Goal: Maintains/Returns to pre admission functional level  Description: INTERVENTIONS:  - Perform AM-PAC 6 Click Basic Mobility/ Daily Activity assessment daily.  - Set and communicate daily mobility goal to care team and patient/family/caregiver.   - Collaborate with rehabilitation services on mobility goals if consulted  - Perform Range of Motion 3 times a day.  - Reposition patient every 2 hours.  - Dangle patient 3 times a day  - Stand patient 3 times a day  - Ambulate patient 3 times a day  - Out of bed to chair 3 times a day   - Out of bed for meals 3 times a day  - Out of bed for toileting  - Record patient progress and toleration of activity level   Outcome: Progressing

## 2024-02-03 NOTE — PROGRESS NOTES
[facility]  Progress Note: Critical Care  Name: Carla Hunt 58 y.o. female I MRN: [id number]  Unit/Bed#: ICCU 207-01 I Date of Admission: 1/10/2024   Date of Service: 2/3/2024 I Hospital Day: 24    Assessment/Plan   Neuro:   Diagnosis: seizure disorder  S/p partial left temporal lobe resection in 2007  Contuinue home lamictal 150 bid and topamax 100 bid  Diagnosis: anxiety  Continue home effexor  Seroquel 25 hs and melatonin 6 hs for sleep     CV:   No active issues     Pulm:  Diagnosis: acute hypoxic respiratory failure   Tested COVID positive on 1/7  Completed severe COVID pathway and 7 days CTX  Initially doing well with decreasing O2 requirements after transfer out of ICU, but pulm consulted 1/26 for increasing O2 requirements as of 1/24  Steroids restarted for taper due to abrupt cessation upon completion of COVID pathway   Morning of 1/28 pt requiring HFNC 100% so was upgraded to SD1 under critical care, downgraded to SD2 on 1/30 but continued to have high O2 requirements so was upgraded to CC again 2/1  Imaging findings with diffuse GGO and multifocal consolidations as well as bronchial dilation - concern that this could represent fibrosis 2/2 rituxan, diffuse pattern not as consistent with post-COVID fibrosis. 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) though unclear how this was diagnosed, this also does not enirely fit as she is NOT hypokalemic  Continue to  monitor I/O and UOP\  Patient responds to flood boluses - would recommend additional fluid bolus today, also patient is net negative around 2.5 L today      F/E/N:   F: none   E: monitor and replete for goal K>4, P>3, Mg>2  N: regular house      Heme/Onc:   Diagnosis: B cell lymphoma  Follows with heme/onc at [facility]  On rituxan for 2 year course, started May 2022  Last dose was 12/20, treatment currently on hold   Leukopenic on admission but WBC now wnl  DVT ppx with lovenox      Endo:   Diagnosis: steroid hyperglycemia   SSI, hypoglycemic protocol  Goal -180     ID:   Diagnosis: COVID pneumonia vs tracheobronchitis  Completed severe COVID pathway with decadron (ended 1/22) and remdesivir (ended 1/20), also completed 7 days CXT on 1/15  C/f opportunistic infections given immunocompromised status on chemotherapy and recent steroid use  No consolidations on CXR and procal negative  S/p bactrim and minocycline  ID following, appreciate recommendations   Continues on prednisone 60 as noted above for suspected pneumonitis in the setting of rituxin      MSK/Skin:   No active issues       Disposition: Stepdown Level 1    ICU Core Measures     A: Assess, Prevent, and Manage Pain Has pain been assessed? NA  Need for changes to pain regimen? NA   B: Both SAT/SAT  N/A   C: Choice of Sedation RASS Goal: 0 Alert and Calm or N/A patient not on sedation  Need for changes to sedation or analgesia regimen? NA   D: Delirium CAM-ICU: Negative   E: Early Mobility  Plan for early mobility? Yes   F: Family Engagement Plan for family engagement today? Yes       Review of Invasive Devices:            Prophylaxis:  VTE VTE covered by:  enoxaparin, Subcutaneous, 40 mg at 02/02/24 1117       Stress Ulcer  not ordered        Significant 24hr Events     24hr events: no siginifcant overnight events. Underwent bronch yesterday. Continues to require HF O2. Currently on HF NC 45L 60%     Subjective     Review of Systems   Constitutional:   Negative for chills and fever.   HENT:  Negative for ear pain and sore throat.    Eyes:  Negative for pain and visual disturbance.   Respiratory:  Negative for cough and shortness of breath.    Cardiovascular:  Negative for chest pain and palpitations.   Gastrointestinal:  Negative for abdominal pain and vomiting.   Genitourinary:  Negative for dysuria and hematuria.   Musculoskeletal:  Negative for arthralgias and back pain.   Skin:  Negative for color change and rash.   Neurological:  Negative for seizures and syncope.   All other systems reviewed and are negative.       Objective                            Vitals I/O      Most Recent Min/Max in 24hrs   Temp 98.3 °F (36.8 °C) Temp  Min: 96.3 °F (35.7 °C)  Max: 98.8 °F (37.1 °C)   Pulse 94 Pulse  Min: 84  Max: 106   Resp 20 Resp  Min: 18  Max: 24   /72 BP  Min: 98/64  Max: 113/69   O2 Sat 95 % SpO2  Min: 92 %  Max: 98 %      Intake/Output Summary (Last 24 hours) at 2/3/2024 0804  Last data filed at 2/3/2024 0601  Gross per 24 hour   Intake 698 ml   Output 1500 ml   Net -802 ml       Diet Regular; Regular House    Invasive Monitoring           Physical Exam   Physical Exam  Eyes:      Extraocular Movements: Extraocular movements intact.      Pupils: Pupils are equal, round, and reactive to light.   Skin:     General: Skin is dry.   HENT:      Head: Normocephalic and atraumatic.      Mouth/Throat:      Mouth: Mucous membranes are dry.   Cardiovascular:      Rate and Rhythm: Regular rhythm. Tachycardia present.      Heart sounds: Normal heart sounds.   Musculoskeletal:      Right lower leg: No edema.      Left lower leg: No edema.   Abdominal: General: Bowel sounds are normal. There is no distension.      Palpations: Abdomen is soft.   Constitutional:       General: She is awake. She is not in acute distress.     Appearance: She is well-developed.      Interventions: Nasal cannula in place.   Pulmonary:      Breath sounds: Rales present.      Comments: Hf 45L  60%  Psychiatric:         Behavior: Behavior is cooperative.   Neurological:      General: No focal deficit present.      Mental Status: She is alert and oriented to person, place and time.   Genitourinary/Anorectal:  external catheter present.          Diagnostic Studies      EKG: NSR  Imaging: I have personally reviewed pertinent reports.   and I have personally reviewed pertinent films in PACS     Medications:  Scheduled PRN   chlorhexidine, 15 mL, Q12H SARTHAK  enoxaparin, 40 mg, Daily  insulin lispro, 1-5 Units, 4x Daily (AC & HS)  lamoTRIgine, 150 mg, BID  melatonin, 6 mg, HS  nystatin, 500,000 Units, 4x Daily  polyethylene glycol, 17 g, Daily  predniSONE, 60 mg, Daily  QUEtiapine, 25 mg, HS  senna-docusate sodium, 2 tablet, BID  topiramate, 100 mg, BID  venlafaxine, 25 mg, TID With Meals      acetaminophen, 650 mg, Q6H PRN  bisacodyl, 10 mg, Daily PRN  calcium carbonate, 500 mg, BID PRN  metoprolol, 2.5 mg, Q6H PRN  ondansetron, 4 mg, Q6H PRN  sodium chloride, 1 spray, Q1H PRN       Continuous          Labs:    CBC    Recent Labs     02/02/24  0706 02/03/24  0446   WBC 6.75 8.31   HGB 13.3 13.6   HCT 41.9 41.9    240     BMP    Recent Labs     02/02/24  0706 02/03/24  0446   SODIUM 140 137   K 4.9 5.0   * 109*   CO2 20* 18*   AGAP 11 10   BUN 44* 47*   CREATININE 1.26 1.35*   CALCIUM 10.0 10.1       Coags    No recent results     Additional Electrolytes  Recent Labs     02/02/24  0706 02/03/24  0446   MG 2.0 2.0   PHOS 4.0 3.5          Blood Gas    No recent results  No recent results LFTs  No recent results    Infectious  No recent results  Glucose  Recent Labs     02/01/24  1447 02/02/24  0706 02/03/24  0446   GLUC 137 81 93           Rhondait Wally, DO

## 2024-02-04 LAB
ANION GAP SERPL CALCULATED.3IONS-SCNC: 9 MMOL/L
ATRIAL RATE: 105 BPM
BACTERIA BRONCH AEROBE CULT: NO GROWTH
BACTERIA BRONCH AEROBE CULT: NORMAL
BACTERIA BRONCH AEROBE CULT: NORMAL
BUN SERPL-MCNC: 40 MG/DL (ref 5–25)
CALCIUM SERPL-MCNC: 9.8 MG/DL (ref 8.4–10.2)
CHLORIDE SERPL-SCNC: 110 MMOL/L (ref 96–108)
CO2 SERPL-SCNC: 21 MMOL/L (ref 21–32)
CREAT SERPL-MCNC: 1.12 MG/DL (ref 0.6–1.3)
ERYTHROCYTE [DISTWIDTH] IN BLOOD BY AUTOMATED COUNT: 18.6 % (ref 11.6–15.1)
GFR SERPL CREATININE-BSD FRML MDRD: 54 ML/MIN/1.73SQ M
GLUCOSE SERPL-MCNC: 100 MG/DL (ref 65–140)
GLUCOSE SERPL-MCNC: 168 MG/DL (ref 65–140)
GLUCOSE SERPL-MCNC: 297 MG/DL (ref 65–140)
GLUCOSE SERPL-MCNC: 71 MG/DL (ref 65–140)
GLUCOSE SERPL-MCNC: 72 MG/DL (ref 65–140)
GRAM STN SPEC: NORMAL
HCT VFR BLD AUTO: 36.6 % (ref 34.8–46.1)
HGB BLD-MCNC: 11.9 G/DL (ref 11.5–15.4)
MAGNESIUM SERPL-MCNC: 1.9 MG/DL (ref 1.9–2.7)
MCH RBC QN AUTO: 30 PG (ref 26.8–34.3)
MCHC RBC AUTO-ENTMCNC: 32.5 G/DL (ref 31.4–37.4)
MCV RBC AUTO: 92 FL (ref 82–98)
P AXIS: 52 DEGREES
PHOSPHATE SERPL-MCNC: 3.2 MG/DL (ref 2.7–4.5)
PLATELET # BLD AUTO: 195 THOUSANDS/UL (ref 149–390)
PMV BLD AUTO: 11.9 FL (ref 8.9–12.7)
PNEUMOCYSTIS PCR: NEGATIVE
POTASSIUM SERPL-SCNC: 4.6 MMOL/L (ref 3.5–5.3)
PR INTERVAL: 167 MS
QRS AXIS: -9 DEGREES
QRSD INTERVAL: 75 MS
QT INTERVAL: 304 MS
QTC INTERVAL: 402 MS
RBC # BLD AUTO: 3.97 MILLION/UL (ref 3.81–5.12)
SCAN RESULT: NORMAL
SCAN RESULT: NORMAL
SODIUM SERPL-SCNC: 140 MMOL/L (ref 135–147)
T WAVE AXIS: 43 DEGREES
VENTRICULAR RATE: 105 BPM
WBC # BLD AUTO: 5.92 THOUSAND/UL (ref 4.31–10.16)

## 2024-02-04 PROCEDURE — 80048 BASIC METABOLIC PNL TOTAL CA: CPT | Performed by: STUDENT IN AN ORGANIZED HEALTH CARE EDUCATION/TRAINING PROGRAM

## 2024-02-04 PROCEDURE — 82948 REAGENT STRIP/BLOOD GLUCOSE: CPT

## 2024-02-04 PROCEDURE — 94760 N-INVAS EAR/PLS OXIMETRY 1: CPT

## 2024-02-04 PROCEDURE — 99233 SBSQ HOSP IP/OBS HIGH 50: CPT | Performed by: INTERNAL MEDICINE

## 2024-02-04 PROCEDURE — 83735 ASSAY OF MAGNESIUM: CPT | Performed by: STUDENT IN AN ORGANIZED HEALTH CARE EDUCATION/TRAINING PROGRAM

## 2024-02-04 PROCEDURE — 93010 ELECTROCARDIOGRAM REPORT: CPT | Performed by: INTERNAL MEDICINE

## 2024-02-04 PROCEDURE — 84100 ASSAY OF PHOSPHORUS: CPT | Performed by: STUDENT IN AN ORGANIZED HEALTH CARE EDUCATION/TRAINING PROGRAM

## 2024-02-04 PROCEDURE — 85027 COMPLETE CBC AUTOMATED: CPT | Performed by: STUDENT IN AN ORGANIZED HEALTH CARE EDUCATION/TRAINING PROGRAM

## 2024-02-04 RX ORDER — SODIUM CHLORIDE, SODIUM GLUCONATE, SODIUM ACETATE, POTASSIUM CHLORIDE, MAGNESIUM CHLORIDE, SODIUM PHOSPHATE, DIBASIC, AND POTASSIUM PHOSPHATE .53; .5; .37; .037; .03; .012; .00082 G/100ML; G/100ML; G/100ML; G/100ML; G/100ML; G/100ML; G/100ML
500 INJECTION, SOLUTION INTRAVENOUS ONCE
Status: COMPLETED | OUTPATIENT
Start: 2024-02-04 | End: 2024-02-04

## 2024-02-04 RX ADMIN — MELATONIN 6 MG: at 21:52

## 2024-02-04 RX ADMIN — NYSTATIN 500000 UNITS: 100000 SUSPENSION ORAL at 10:00

## 2024-02-04 RX ADMIN — CHLORHEXIDINE GLUCONATE 15 ML: 1.2 SOLUTION ORAL at 08:48

## 2024-02-04 RX ADMIN — VENLAFAXINE 25 MG: 25 TABLET ORAL at 16:39

## 2024-02-04 RX ADMIN — AMPICILLIN SODIUM 2000 MG: 2 INJECTION, POWDER, FOR SOLUTION INTRAVENOUS at 05:49

## 2024-02-04 RX ADMIN — CHLORHEXIDINE GLUCONATE 15 ML: 1.2 SOLUTION ORAL at 21:52

## 2024-02-04 RX ADMIN — QUETIAPINE 25 MG: 25 TABLET ORAL at 21:52

## 2024-02-04 RX ADMIN — ENOXAPARIN SODIUM 40 MG: 40 INJECTION SUBCUTANEOUS at 08:49

## 2024-02-04 RX ADMIN — AMPICILLIN SODIUM 2000 MG: 2 INJECTION, POWDER, FOR SOLUTION INTRAVENOUS at 21:58

## 2024-02-04 RX ADMIN — AMPICILLIN SODIUM 2000 MG: 2 INJECTION, POWDER, FOR SOLUTION INTRAVENOUS at 16:40

## 2024-02-04 RX ADMIN — LAMOTRIGINE 150 MG: 100 TABLET ORAL at 08:36

## 2024-02-04 RX ADMIN — INSULIN LISPRO 3 UNITS: 100 INJECTION, SOLUTION INTRAVENOUS; SUBCUTANEOUS at 21:54

## 2024-02-04 RX ADMIN — NYSTATIN 500000 UNITS: 100000 SUSPENSION ORAL at 05:50

## 2024-02-04 RX ADMIN — NYSTATIN 500000 UNITS: 100000 SUSPENSION ORAL at 17:27

## 2024-02-04 RX ADMIN — VENLAFAXINE 25 MG: 25 TABLET ORAL at 08:36

## 2024-02-04 RX ADMIN — SODIUM CHLORIDE, SODIUM GLUCONATE, SODIUM ACETATE, POTASSIUM CHLORIDE, MAGNESIUM CHLORIDE, SODIUM PHOSPHATE, DIBASIC, AND POTASSIUM PHOSPHATE 500 ML: .53; .5; .37; .037; .03; .012; .00082 INJECTION, SOLUTION INTRAVENOUS at 20:13

## 2024-02-04 RX ADMIN — TOPIRAMATE 100 MG: 100 TABLET, FILM COATED ORAL at 08:36

## 2024-02-04 RX ADMIN — AMPICILLIN SODIUM 2000 MG: 2 INJECTION, POWDER, FOR SOLUTION INTRAVENOUS at 11:54

## 2024-02-04 RX ADMIN — INSULIN LISPRO 1 UNITS: 100 INJECTION, SOLUTION INTRAVENOUS; SUBCUTANEOUS at 16:30

## 2024-02-04 RX ADMIN — TOPIRAMATE 100 MG: 100 TABLET, FILM COATED ORAL at 17:22

## 2024-02-04 RX ADMIN — NYSTATIN 500000 UNITS: 100000 SUSPENSION ORAL at 15:05

## 2024-02-04 RX ADMIN — VENLAFAXINE 25 MG: 25 TABLET ORAL at 11:56

## 2024-02-04 RX ADMIN — PREDNISONE 60 MG: 20 TABLET ORAL at 08:49

## 2024-02-04 RX ADMIN — LAMOTRIGINE 150 MG: 100 TABLET ORAL at 21:53

## 2024-02-04 NOTE — PLAN OF CARE
Problem: Prexisting or High Potential for Compromised Skin Integrity  Goal: Skin integrity is maintained or improved  Description: INTERVENTIONS:  - Identify patients at risk for skin breakdown  - Assess and monitor skin integrity  - Assess and monitor nutrition and hydration status  - Monitor labs   - Assess for incontinence   - Turn and reposition patient  - Assist with mobility/ambulation  - Relieve pressure over bony prominences  - Avoid friction and shearing  - Provide appropriate hygiene as needed including keeping skin clean and dry  - Evaluate need for skin moisturizer/barrier cream  - Collaborate with interdisciplinary team   - Patient/family teaching  - Consider wound care consult   Outcome: Progressing     Problem: Nutrition/Hydration-ADULT  Goal: Nutrient/Hydration intake appropriate for improving, restoring or maintaining nutritional needs  Description: Monitor and assess patient's nutrition/hydration status for malnutrition. Collaborate with interdisciplinary team and initiate plan and interventions as ordered.  Monitor patient's weight and dietary intake as ordered or per policy. Utilize nutrition screening tool and intervene as necessary. Determine patient's food preferences and provide high-protein, high-caloric foods as appropriate.     INTERVENTIONS:  - Monitor oral intake, urinary output, labs, and treatment plans  - Assess nutrition and hydration status and recommend course of action  - Evaluate amount of meals eaten  - Assist patient with eating if necessary   - Allow adequate time for meals  - Recommend/ encourage appropriate diets, oral nutritional supplements, and vitamin/mineral supplements  - Order, calculate, and assess calorie counts as needed  - Recommend, monitor, and adjust tube feedings and TPN/PPN based on assessed needs  - Assess need for intravenous fluids  - Provide specific nutrition/hydration education as appropriate  - Include patient/family/caregiver in decisions related to  nutrition  Outcome: Progressing     Problem: INFECTION - ADULT  Goal: Absence or prevention of progression during hospitalization  Description: INTERVENTIONS:  - Assess and monitor for signs and symptoms of infection  - Monitor lab/diagnostic results  - Monitor all insertion sites, i.e. indwelling lines, tubes, and drains  - Monitor endotracheal if appropriate and nasal secretions for changes in amount and color  - [place] appropriate cooling/warming therapies per order  - Administer medications as ordered  - Instruct and encourage patient and family to use good hand hygiene technique  - Identify and instruct in appropriate isolation precautions for identified infection/condition  Outcome: Progressing     Problem: SAFETY ADULT  Goal: Patient will remain free of falls  Description: INTERVENTIONS:  - Educate patient/family on patient safety including physical limitations  - Instruct patient to call for assistance with activity   - Consult OT/PT to assist with strengthening/mobility   - Keep Call bell within reach  - Keep bed low and locked with side rails adjusted as appropriate  - Keep care items and personal belongings within reach  - Initiate and maintain comfort rounds  - Make Fall Risk Sign visible to staff  - Offer Toileting every 2 Hours, in advance of need  - Initiate/Maintain bed alarm  - Obtain necessary fall risk management equipment: non skid footwear  - Apply yellow socks and bracelet for high fall risk patients  - Consider moving patient to room near nurses station  Outcome: Progressing  Goal: Maintain or return to baseline ADL function  Description: INTERVENTIONS:  -  Assess patient's ability to carry out ADLs; assess patient's baseline for ADL function and identify physical deficits which impact ability to perform ADLs (bathing, care of mouth/teeth, toileting, grooming, dressing, etc.)  - Assess/evaluate cause of self-care deficits   - Assess range of motion  - Assess patient's mobility; develop plan  if impaired  - Assess patient's need for assistive devices and provide as appropriate  - Encourage maximum independence but intervene and supervise when necessary  - Involve family in performance of ADLs  - Assess for home care needs following discharge   - Consider OT consult to assist with ADL evaluation and planning for discharge  - Provide patient education as appropriate  Outcome: Progressing  Goal: Maintains/Returns to pre admission functional level  Description: INTERVENTIONS:  - Perform AM-PAC 6 Click Basic Mobility/ Daily Activity assessment daily.  - Set and communicate daily mobility goal to care team and patient/family/caregiver.   - Collaborate with rehabilitation services on mobility goals if consulted  - Perform Range of Motion 3 times a day.  - Reposition patient every 2 hours.  - Dangle patient 3 times a day  - Stand patient 3 times a day  - Ambulate patient 3 times a day  - Out of bed to chair 3 times a day   - Out of bed for meals 3 times a day  - Out of bed for toileting  - Record patient progress and toleration of activity level   Outcome: Progressing

## 2024-02-04 NOTE — PROGRESS NOTES
[facility]  Progress Note: Critical Care  Name: Carla Hunt 58 y.o. female I MRN: [id number]  Unit/Bed#: ICCU 207-01 I Date of Admission: 1/10/2024   Date of Service: 2/4/2024 I Hospital Day: 25    Assessment/Plan   Neuro:   Diagnosis: seizure disorder  S/p partial left temporal lobe resection in 2007  Contuinue home lamictal 150 bid and topamax 100 bid  Diagnosis: anxiety  Continue home effexor  Seroquel 25 hs and melatonin 6 hs for sleep     CV:   No active issues     Pulm:  Diagnosis: acute hypoxic respiratory failure   Tested COVID positive on 1/7  Completed severe COVID pathway and 7 days CTX  Initially doing well with decreasing O2 requirements after transfer out of ICU, but pulm consulted 1/26 for increasing O2 requirements as of 1/24  Steroids restarted for taper due to abrupt cessation upon completion of COVID pathway   Morning of 1/28 pt requiring HFNC 100% so was upgraded to SD1 under critical care, downgraded to SD2 on 1/30 but continued to have high O2 requirements so was upgraded to CC again 2/1  Imaging findings with diffuse GGO and multifocal consolidations as well as bronchial dilation - concern that this could represent fibrosis 2/2 rituxan, diffuse pattern not as consistent with post-COVID fibrosis. 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) though  unclear how this was diagnosed, this also does not enirely fit as she is NOT hypokalemic  Continue to monitor I/O and UOP\  Patient responds to fluid boluses      F/E/N:   F: none   E: monitor and replete for goal K>4, P>3, Mg>2  N: regular house      Heme/Onc:   Diagnosis: B cell lymphoma  Follows with heme/onc at [facility]  On rituxan for 2 year course, started May 2022  Last dose was 12/20, treatment currently on hold   Leukopenic on admission but WBC now wnl  DVT ppx with lovenox      Endo:   Diagnosis: steroid hyperglycemia   SSI, hypoglycemic protocol  Goal -180     ID:   Diagnosis: COVID pneumonia vs tracheobronchitis  Completed severe COVID pathway with decadron (ended 1/22) and remdesivir (ended 1/20), also completed 7 days CXT on 1/15  C/f opportunistic infections given immunocompromised status on chemotherapy and recent steroid use  No consolidations on CXR and procal negative  S/p bactrim and minocycline x5 days  Bronc on 2/2 - Cx growing 1+ alpha hemolytic strep, AFB negative, f/u PCP, fungal, and viral cultures   Now on ampicillin 2g q6h as bronc culture growing 1+ alpha hemolytic strep, f/u further speciation and sensitivities   ID following, appreciate recommendations   Continues on prednisone 60 as noted above for suspected pneumonitis in the setting of rituxin      MSK/Skin:   No active issues    Disposition: Stepdown Level 1    ICU Core Measures     A: Assess, Prevent, and Manage Pain Has pain been assessed? Yes  Need for changes to pain regimen? No   B: Both SAT/SAT  N/A   C: Choice of Sedation RASS Goal: 0 Alert and Calm  Need for changes to sedation or analgesia regimen? No   D: Delirium CAM-ICU: Negative   E: Early Mobility  Plan for early mobility? Yes   F: Family Engagement Plan for family engagement today? Yes       Antibiotic Review: Patient on appropriate coverage based on culture data.     Review of Invasive Devices:            Prophylaxis:  VTE VTE covered by:  enoxaparin,  Subcutaneous, 40 mg at 02/04/24 0849       Stress Ulcer  not ordered        Significant 24hr Events     24hr events: No events overnight. Pt continues to desat with ambulation. She was seated in chair during exam today. She states that she continues to feel improved. No new complaints.      Subjective     Review of Systems   Constitutional:  Negative for appetite change, chills and fever.   HENT:  Negative for congestion.    Respiratory:  Negative for shortness of breath.    Cardiovascular:  Negative for chest pain.   Gastrointestinal:  Negative for abdominal pain and constipation.        Frequent BM        Objective                            Vitals I/O      Most Recent Min/Max in 24hrs   Temp 98 °F (36.7 °C) Temp  Min: 97.9 °F (36.6 °C)  Max: 98.9 °F (37.2 °C)   Pulse 102 Pulse  Min: 96  Max: 114   Resp 22 Resp  Min: 18  Max: 24   BP 97/61 BP  Min: 84/58  Max: 98/57   O2 Sat 94 % SpO2  Min: 92 %  Max: 97 %      Intake/Output Summary (Last 24 hours) at 2/4/2024 1021  Last data filed at 2/4/2024 0815  Gross per 24 hour   Intake 1135 ml   Output 1650 ml   Net -515 ml       Diet Regular; Regular House    Invasive Monitoring   None         Physical Exam   Physical Exam  Eyes:      General:         Right eye: No discharge.         Left eye: No discharge.      Conjunctiva/sclera: Conjunctivae normal.   Skin:     General: Skin is warm and dry.   HENT:      Head: Normocephalic and atraumatic.      Right Ear: No drainage.      Left Ear: No drainage.      Nose: Epistaxis present.      Mouth/Throat:      Mouth: Mucous membranes are moist.   Cardiovascular:      Rate and Rhythm: Normal rate and regular rhythm.   Musculoskeletal:      Right lower leg: No edema.      Left lower leg: No edema.   Abdominal: General: Bowel sounds are normal. There is no distension.      Palpations: Abdomen is soft.      Tenderness: There is no abdominal tenderness. There is no guarding.   Constitutional:       General: She is not in acute  distress.     Interventions: She is not sedated and not intubated.  Pulmonary:      Effort: Pulmonary effort is normal. No respiratory distress. She is not intubated.      Breath sounds: Rales (bilateral bases) present.      Comments: On HFNC  Neurological:      Mental Status: She is alert and oriented to person, place and time. She is calm.            Diagnostic Studies      EKG: sinus tachycardia  Imaging: CT chest with persistent bilateral GGO slightly increased in MANDY and improved in RML and RLL consolidations, could be sequela of COVID but cannot exclude rituxan pneumonitis I have personally reviewed pertinent reports.   and I have personally reviewed pertinent films in PACS     Medications:  Scheduled PRN   ampicillin, 2,000 mg, Q6H  chlorhexidine, 15 mL, Q12H SARTHAK  enoxaparin, 40 mg, Daily  insulin lispro, 1-5 Units, 4x Daily (AC & HS)  lamoTRIgine, 150 mg, BID  melatonin, 6 mg, HS  nystatin, 500,000 Units, 4x Daily  polyethylene glycol, 17 g, Daily  predniSONE, 60 mg, Daily  QUEtiapine, 25 mg, HS  senna-docusate sodium, 2 tablet, BID  topiramate, 100 mg, BID  venlafaxine, 25 mg, TID With Meals      acetaminophen, 650 mg, Q6H PRN  bisacodyl, 10 mg, Daily PRN  calcium carbonate, 500 mg, BID PRN  metoprolol, 2.5 mg, Q6H PRN  ondansetron, 4 mg, Q6H PRN  sodium chloride, 1 spray, Q1H PRN       Continuous          Labs:    CBC    Recent Labs     02/03/24  0446 02/04/24  0559   WBC 8.31 5.92   HGB 13.6 11.9   HCT 41.9 36.6    195     BMP    Recent Labs     02/03/24  0446 02/04/24  0559   SODIUM 137 140   K 5.0 4.6   * 110*   CO2 18* 21   AGAP 10 9   BUN 47* 40*   CREATININE 1.35* 1.12   CALCIUM 10.1 9.8       Coags    No recent results     Additional Electrolytes  Recent Labs     02/03/24  0446 02/04/24  0559   MG 2.0 1.9   PHOS 3.5 3.2          Blood Gas    No recent results  No recent results LFTs  No recent results    Infectious  No recent results  Glucose  Recent Labs     02/03/24  0446  02/04/24  0559   GLUC 93 72                   Risa Handley MD

## 2024-02-04 NOTE — PLAN OF CARE
Problem: Prexisting or High Potential for Compromised Skin Integrity  Goal: Skin integrity is maintained or improved  Description: INTERVENTIONS:  - Identify patients at risk for skin breakdown  - Assess and monitor skin integrity  - Assess and monitor nutrition and hydration status  - Monitor labs   - Assess for incontinence   - Turn and reposition patient  - Assist with mobility/ambulation  - Relieve pressure over bony prominences  - Avoid friction and shearing  - Provide appropriate hygiene as needed including keeping skin clean and dry  - Evaluate need for skin moisturizer/barrier cream  - Collaborate with interdisciplinary team   - Patient/family teaching  - Consider wound care consult   Outcome: Progressing     Problem: Nutrition/Hydration-ADULT  Goal: Nutrient/Hydration intake appropriate for improving, restoring or maintaining nutritional needs  Description: Monitor and assess patient's nutrition/hydration status for malnutrition. Collaborate with interdisciplinary team and initiate plan and interventions as ordered.  Monitor patient's weight and dietary intake as ordered or per policy. Utilize nutrition screening tool and intervene as necessary. Determine patient's food preferences and provide high-protein, high-caloric foods as appropriate.     INTERVENTIONS:  - Monitor oral intake, urinary output, labs, and treatment plans  - Assess nutrition and hydration status and recommend course of action  - Evaluate amount of meals eaten  - Assist patient with eating if necessary   - Allow adequate time for meals  - Recommend/ encourage appropriate diets, oral nutritional supplements, and vitamin/mineral supplements  - Order, calculate, and assess calorie counts as needed  - Recommend, monitor, and adjust tube feedings and TPN/PPN based on assessed needs  - Assess need for intravenous fluids  - Provide specific nutrition/hydration education as appropriate  - Include patient/family/caregiver in decisions related to  nutrition  Outcome: Progressing     Problem: PAIN - ADULT  Goal: Verbalizes/displays adequate comfort level or baseline comfort level  Description: Interventions:  - Encourage patient to monitor pain and request assistance  - Assess pain using appropriate pain scale  - Administer analgesics based on type and severity of pain and evaluate response  - Implement non-pharmacological measures as appropriate and evaluate response  - Consider cultural and social influences on pain and pain management  - Notify physician/advanced practitioner if interventions unsuccessful or patient reports new pain  Outcome: Progressing     Problem: INFECTION - ADULT  Goal: Absence or prevention of progression during hospitalization  Description: INTERVENTIONS:  - Assess and monitor for signs and symptoms of infection  - Monitor lab/diagnostic results  - Monitor all insertion sites, i.e. indwelling lines, tubes, and drains  - Monitor endotracheal if appropriate and nasal secretions for changes in amount and color  - [place] appropriate cooling/warming therapies per order  - Administer medications as ordered  - Instruct and encourage patient and family to use good hand hygiene technique  - Identify and instruct in appropriate isolation precautions for identified infection/condition  Outcome: Progressing     Problem: DISCHARGE PLANNING  Goal: Discharge to home or other facility with appropriate resources  Description: INTERVENTIONS:  - Identify barriers to discharge w/patient and caregiver  - Arrange for needed discharge resources and transportation as appropriate  - Identify discharge learning needs (meds, wound care, etc.)  - Arrange for interpretive services to assist at discharge as needed  - Refer to Case Management Department for coordinating discharge planning if the patient needs post-hospital services based on physician/advanced practitioner order or complex needs related to functional status, cognitive ability, or social support  system  Outcome: Progressing     Problem: Knowledge Deficit  Goal: Patient/family/caregiver demonstrates understanding of disease process, treatment plan, medications, and discharge instructions  Description: Complete learning assessment and assess knowledge base.  Interventions:  - Provide teaching at level of understanding  - Provide teaching via preferred learning methods  Outcome: Progressing     Problem: NEUROSENSORY - ADULT  Goal: Achieves stable or improved neurological status  Description: INTERVENTIONS  - Monitor and report changes in neurological status  - Monitor vital signs such as temperature, blood pressure, glucose, and any other labs ordered   - Initiate measures to prevent increased intracranial pressure  - Monitor for seizure activity and implement precautions if appropriate      Outcome: Progressing     Problem: NEUROSENSORY - ADULT  Goal: Achieves maximal functionality and self care  Description: INTERVENTIONS  - Monitor swallowing and airway patency with patient fatigue and changes in neurological status  - Encourage and assist patient to increase activity and self care.   - Encourage visually impaired, hearing impaired and aphasic patients to use assistive/communication devices  Outcome: Progressing     Problem: CARDIOVASCULAR - ADULT  Goal: Maintains optimal cardiac output and hemodynamic stability  Description: INTERVENTIONS:  - Monitor I/O, vital signs and rhythm  - Monitor for S/S and trends of decreased cardiac output  - Administer and titrate ordered vasoactive medications to optimize hemodynamic stability  - Assess quality of pulses, skin color and temperature  - Assess for signs of decreased coronary artery perfusion  - Instruct patient to report change in severity of symptoms  Outcome: Progressing     Problem: CARDIOVASCULAR - ADULT  Goal: Absence of cardiac dysrhythmias or at baseline rhythm  Description: INTERVENTIONS:  - Continuous cardiac monitoring, vital signs, obtain 12 lead EKG  if ordered  - Administer antiarrhythmic and heart rate control medications as ordered  - Monitor electrolytes and administer replacement therapy as ordered  Outcome: Progressing     Problem: RESPIRATORY - ADULT  Goal: Achieves optimal ventilation and oxygenation  Description: INTERVENTIONS:  - Assess for changes in respiratory status  - Assess for changes in mentation and behavior  - Position to facilitate oxygenation and minimize respiratory effort  - Oxygen administered by appropriate delivery if ordered  - Initiate smoking cessation education as indicated  - Encourage broncho-pulmonary hygiene including cough, deep breathe, Incentive Spirometry  - Assess the need for suctioning and aspirate as needed  - Assess and instruct to report SOB or any respiratory difficulty  - Respiratory Therapy support as indicated  Outcome: Progressing     Problem: GASTROINTESTINAL - ADULT  Goal: Maintains or returns to baseline bowel function  Description: INTERVENTIONS:  - Assess bowel function  - Encourage oral fluids to ensure adequate hydration  - Administer IV fluids if ordered to ensure adequate hydration  - Administer ordered medications as needed  - Encourage mobilization and activity  - Consider nutritional services referral to assist patient with adequate nutrition and appropriate food choices  Outcome: Progressing     Problem: GASTROINTESTINAL - ADULT  Goal: Maintains adequate nutritional intake  Description: INTERVENTIONS:  - Monitor percentage of each meal consumed  - Identify factors contributing to decreased intake, treat as appropriate  - Assist with meals as needed  - Monitor I&O, weight, and lab values if indicated  - Obtain nutrition services referral as needed  Outcome: Progressing     Problem: GENITOURINARY - ADULT  Goal: Maintains or returns to baseline urinary function  Description: INTERVENTIONS:  - Assess urinary function  - Encourage oral fluids to ensure adequate hydration if ordered  - Administer IV fluids  as ordered to ensure adequate hydration  - Administer ordered medications as needed  - Offer frequent toileting  - Follow urinary retention protocol if ordered  Outcome: Progressing     Problem: GENITOURINARY - ADULT  Goal: Absence of urinary retention  Description: INTERVENTIONS:  - Assess patient's ability to void and empty bladder  - Monitor I/O  - Bladder scan as needed  - Discuss with physician/AP medications to alleviate retention as needed  - Discuss catheterization for long term situations as appropriate  Outcome: Progressing     Problem: METABOLIC, FLUID AND ELECTROLYTES - ADULT  Goal: Electrolytes maintained within normal limits  Description: INTERVENTIONS:  - Monitor labs and assess patient for signs and symptoms of electrolyte imbalances  - Administer electrolyte replacement as ordered  - Monitor response to electrolyte replacements, including repeat lab results as appropriate  - Instruct patient on fluid and nutrition as appropriate  Outcome: Progressing     Problem: METABOLIC, FLUID AND ELECTROLYTES - ADULT  Goal: Fluid balance maintained  Description: INTERVENTIONS:  - Monitor labs   - Monitor I/O and WT  - Instruct patient on fluid and nutrition as appropriate  - Assess for signs & symptoms of volume excess or deficit  Outcome: Progressing     Problem: SKIN/TISSUE INTEGRITY - ADULT  Goal: Skin Integrity remains intact(Skin Breakdown Prevention)  Description: Assess:  -Perform Flavio assessment every shift   -Clean and moisturize skin every day   -Inspect skin when repositioning, toileting, and assisting with ADLS  -Assess under medical devices such as ronny  every hour   -Assess extremities for adequate circulation and sensation     Bed Management:  -Have minimal linens on bed & keep smooth, unwrinkled  -Change linens as needed when moist or perspiring  -Avoid sitting or lying in one position for more than 2 hours while in bed  -Keep HOB at 45 degrees     Toileting:  -Offer bedside commode  -Assess  for incontinence every hour  -Use incontinent care products after each incontinent episode such as moisture barrier cream     Activity:  -Mobilize patient 3 times a day  -Encourage activity and walks on unit  -Encourage or provide ROM exercises   -Turn and reposition patient every 2 Hours  -Use appropriate equipment to lift or move patient in bed  -Instruct/ Assist with weight shifting every hour when out of bed in chair  -Consider limitation of chair time 2 hour intervals    Skin Care:  -Avoid use of baby powder, tape, friction and shearing, hot water or constrictive clothing  -Relieve pressure over bony prominences using allevyn   -Do not massage red bony areas    Next Steps:  -Teach patient strategies to minimize risks such as weight shifting    -Consider consults to  interdisciplinary teams such as PT  Outcome: Progressing     Problem: SKIN/TISSUE INTEGRITY - ADULT  Goal: Incision(s), wounds(s) or drain site(s) healing without S/S of infection  Description: INTERVENTIONS  - Assess and document dressing, incision, wound bed, drain sites and surrounding tissue  - Provide patient and family education  - Perform skin care/dressing changes every day  Outcome: Progressing     Problem: HEMATOLOGIC - ADULT  Goal: Maintains hematologic stability  Description: INTERVENTIONS  - Assess for signs and symptoms of bleeding or hemorrhage  - Monitor labs  - Administer supportive blood products/factors as ordered and appropriate  Outcome: Progressing     Problem: MUSCULOSKELETAL - ADULT  Goal: Maintain or return mobility to safest level of function  Description: INTERVENTIONS:  - Assess patient's ability to carry out ADLs; assess patient's baseline for ADL function and identify physical deficits which impact ability to perform ADLs (bathing, care of mouth/teeth, toileting, grooming, dressing, etc.)  - Assess/evaluate cause of self-care deficits   - Assess range of motion  - Assess patient's mobility  - Assess patient's need for  assistive devices and provide as appropriate  - Encourage maximum independence but intervene and supervise when necessary  - Involve family in performance of ADLs  - Assess for home care needs following discharge   - Consider OT consult to assist with ADL evaluation and planning for discharge  - Provide patient education as appropriate  Outcome: Progressing     Problem: COPING  Goal: Pt/Family able to verbalize concerns and demonstrate effective coping strategies  Description: INTERVENTIONS:  - Assist patient/family to identify coping skills, available support systems and cultural and spiritual values  - Provide emotional support, including active listening and acknowledgement of concerns of patient and caregivers  - Reduce environmental stimuli, as able  - Provide patient education  - Assess for spiritual pain/suffering and initiate spiritual care, including notification of Pastoral Care or natalia based community as needed  - Assess effectiveness of coping strategies  Outcome: Progressing     Problem: COPING  Goal: Will report anxiety at manageable levels  Description: INTERVENTIONS:  - Administer medication as ordered  - Teach and encourage coping skills  - Provide emotional support  - Assess patient/family for anxiety and ability to cope  Outcome: Progressing     Problem: Potential for Falls  Goal: Patient will remain free of falls  Description: INTERVENTIONS:  - Educate patient/family on patient safety including physical limitations  - Instruct patient to call for assistance with activity   - Consult OT/PT to assist with strengthening/mobility   - Keep Call bell within reach  - Keep bed low and locked with side rails adjusted as appropriate  - Keep care items and personal belongings within reach  - Initiate and maintain comfort rounds  - Make Fall Risk Sign visible to staff  - Offer Toileting every 2 Hours, in advance of need  - Initiate/Maintain bed alarm  - Obtain necessary fall risk management equipment: non  skid footwear  - Apply yellow socks and bracelet for high fall risk patients  - Consider moving patient to room near nurses station  Outcome: Progressing

## 2024-02-05 LAB
FUNGUS SPEC CULT: NORMAL
FUNGUS SPEC CULT: NORMAL
GLUCOSE SERPL-MCNC: 114 MG/DL (ref 65–140)
GLUCOSE SERPL-MCNC: 168 MG/DL (ref 65–140)
GLUCOSE SERPL-MCNC: 266 MG/DL (ref 65–140)
GLUCOSE SERPL-MCNC: 65 MG/DL (ref 65–140)
GLUCOSE SERPL-MCNC: 85 MG/DL (ref 65–140)

## 2024-02-05 PROCEDURE — 97535 SELF CARE MNGMENT TRAINING: CPT

## 2024-02-05 PROCEDURE — 82948 REAGENT STRIP/BLOOD GLUCOSE: CPT

## 2024-02-05 PROCEDURE — 97530 THERAPEUTIC ACTIVITIES: CPT

## 2024-02-05 PROCEDURE — 94760 N-INVAS EAR/PLS OXIMETRY 1: CPT

## 2024-02-05 PROCEDURE — 94003 VENT MGMT INPAT SUBQ DAY: CPT

## 2024-02-05 PROCEDURE — 99233 SBSQ HOSP IP/OBS HIGH 50: CPT | Performed by: INTERNAL MEDICINE

## 2024-02-05 RX ORDER — QUETIAPINE FUMARATE 25 MG/1
12.5 TABLET, FILM COATED ORAL
Status: DISCONTINUED | OUTPATIENT
Start: 2024-02-05 | End: 2024-02-20

## 2024-02-05 RX ORDER — PREDNISONE 20 MG/1
80 TABLET ORAL DAILY
Status: DISCONTINUED | OUTPATIENT
Start: 2024-02-08 | End: 2024-02-10

## 2024-02-05 RX ORDER — AMOXICILLIN 250 MG
2 CAPSULE ORAL 2 TIMES DAILY PRN
Status: DISCONTINUED | OUTPATIENT
Start: 2024-02-05 | End: 2024-02-08

## 2024-02-05 RX ADMIN — MELATONIN 6 MG: at 21:47

## 2024-02-05 RX ADMIN — INSULIN LISPRO 2 UNITS: 100 INJECTION, SOLUTION INTRAVENOUS; SUBCUTANEOUS at 21:49

## 2024-02-05 RX ADMIN — CHLORHEXIDINE GLUCONATE 15 ML: 1.2 SOLUTION ORAL at 08:40

## 2024-02-05 RX ADMIN — INSULIN LISPRO 1 UNITS: 100 INJECTION, SOLUTION INTRAVENOUS; SUBCUTANEOUS at 18:03

## 2024-02-05 RX ADMIN — ENOXAPARIN SODIUM 40 MG: 40 INJECTION SUBCUTANEOUS at 08:39

## 2024-02-05 RX ADMIN — TOPIRAMATE 100 MG: 100 TABLET, FILM COATED ORAL at 08:40

## 2024-02-05 RX ADMIN — VENLAFAXINE 25 MG: 25 TABLET ORAL at 19:14

## 2024-02-05 RX ADMIN — QUETIAPINE FUMARATE 12.5 MG: 25 TABLET ORAL at 21:47

## 2024-02-05 RX ADMIN — LAMOTRIGINE 150 MG: 100 TABLET ORAL at 21:47

## 2024-02-05 RX ADMIN — NYSTATIN 500000 UNITS: 100000 SUSPENSION ORAL at 11:00

## 2024-02-05 RX ADMIN — CHLORHEXIDINE GLUCONATE 15 ML: 1.2 SOLUTION ORAL at 21:48

## 2024-02-05 RX ADMIN — AMPICILLIN SODIUM 2000 MG: 2 INJECTION, POWDER, FOR SOLUTION INTRAVENOUS at 05:31

## 2024-02-05 RX ADMIN — NYSTATIN 500000 UNITS: 100000 SUSPENSION ORAL at 14:05

## 2024-02-05 RX ADMIN — PREDNISONE 60 MG: 20 TABLET ORAL at 08:39

## 2024-02-05 RX ADMIN — VENLAFAXINE 25 MG: 25 TABLET ORAL at 08:40

## 2024-02-05 RX ADMIN — NYSTATIN 500000 UNITS: 100000 SUSPENSION ORAL at 05:32

## 2024-02-05 RX ADMIN — LAMOTRIGINE 150 MG: 100 TABLET ORAL at 08:41

## 2024-02-05 RX ADMIN — TOPIRAMATE 100 MG: 100 TABLET, FILM COATED ORAL at 19:15

## 2024-02-05 RX ADMIN — NYSTATIN 500000 UNITS: 100000 SUSPENSION ORAL at 18:37

## 2024-02-05 RX ADMIN — SODIUM CHLORIDE 1000 MG: 0.9 INJECTION, SOLUTION INTRAVENOUS at 14:05

## 2024-02-05 RX ADMIN — VENLAFAXINE 25 MG: 25 TABLET ORAL at 12:56

## 2024-02-05 NOTE — PLAN OF CARE
Problem: OCCUPATIONAL THERAPY ADULT  Goal: Performs self-care activities at highest level of function for planned discharge setting.  See evaluation for individualized goals.  Description: Treatment Interventions: ADL retraining, Functional transfer training, UE strengthening/ROM, Endurance training, Patient/family training, Equipment evaluation/education, Compensatory technique education, Continued evaluation, Energy conservation, Activityengagement          See flowsheet documentation for full assessment, interventions and recommendations.   Outcome: Progressing  Note: Limitation: Decreased ADL status, Decreased Safe judgement during ADL, Decreased cognition, Decreased endurance, Decreased self-care trans, Decreased high-level ADLs  Prognosis: Good  Assessment: Pt seen for skilled OT treatment session today w/ focus on bed mobility, ADLs, standing tolerance, sitting/standing balance, dynamic reach, endurance, deep breathing strategies, energy conservation techniques, functional mobility and activity tolerance. Pt overall requires S-Min A for functional tasks. Pt is limited by drop in respiratory status w/ any activity. Pt on 45>50L HFNC & NRB. O2 sat fluctuating throughout session, mostly around 84-88% but occasionally dropped into high 60s. Pt not notably significantly SOB but pt requiring frequent rest breaks and deep breathing to maintain O2 in high 80s. Pt required increased assist w/ ADL tasks 2/2 continual drop in O2 w/ activity. Pt would be able to demonstrate higher level of independence if able to tolerate longer amounts of functional tasks w/o becoming fatigued. Pt required no use of DME. Left seated OOB in chair at end of session w/ all needs in reach and RN notified. Pt remains limited by fatigue, SOB, activity tolerance, functional mobility, endurance and decreased I w/ ADLS. Continues to benefit from immediate acute skilled OT services. Will continue to follow to work towards independence and  previously established goals.     Rehab Resource Intensity Level, OT: III (Minimum Resource Intensity)     Felisha Wilkerson MS, OTR/L

## 2024-02-05 NOTE — PHYSICAL THERAPY NOTE
"   PT Treatment       02/05/24 1059   PT Last Visit   PT Visit Date 02/05/24   Note Type   Note Type Treatment   Pain Assessment   Pain Assessment Tool 0-10   Pain Score No Pain   Restrictions/Precautions   Other Precautions Fall Risk;O2;Telemetry;Multiple lines  (HFNC)   General   Chart Reviewed Yes   Additional Pertinent History primary diagnosis of acute respiratory failure. COVID + 1/7. today patient on HFNC   Response to Previous Treatment Patient with no complaints from previous session.   Family/Caregiver Present Yes  (spouse)   Cognition   Arousal/Participation Alert;Cooperative   Attention Within functional limits   Orientation Level Oriented X4   Memory Decreased recall of recent events   Following Commands Follows one step commands without difficulty   Comments pleasant, cooperative   Subjective   Subjective \"I would like to get freshened up\"   Bed Mobility   Supine to Sit 5  Supervision   Additional items HOB elevated;Increased time required   Sit to Supine Unable to assess   Additional Comments patient received supine in bed and able to perform supine-->sit transfer S level toward L side of bed. she maintained sitting EOB for total of 10-12 minutes while participating in dynamic seated tasks. therapist needed to initiate rest breaks due to O2 desaturations. post treatment patient seated OOB in chair with lines intact   Transfers   Sit to Stand 4  Minimal assistance  (CG assist)   Additional items Assist x 1   Stand to Sit 5  Supervision   Additional items Increased time required;Verbal cues   Stand pivot 4  Minimal assistance  (CG assist)   Additional items Assist x 1   Additional Comments patient participated in 3 sit<-->stand transfers with CG assist (no use of AD). 1st stance maintained x 2 minutes and 2nd stance x 1 minutes while performing standing dynamic tasks. CG assist to maintain balance. 3rd stand- patient ambulated from bed to chair   Ambulation/Elevation   Gait pattern Excessively " slow;Decreased foot clearance   Gait Assistance 4  Minimal assist   Additional items Assist x 1  (CG assist)   Assistive Device Other (Comment)  (hha)   Distance 3 feet (bed to chair)   Ambulation/Elevation Additional Comments further ambulation deferred due to resp status   Balance   Static Sitting Fair   Static Standing Fair -   Ambulatory Fair -   Endurance Deficit   Endurance Deficit Yes   Endurance Deficit Description patient received on HFNC with resting sats 90-91%. upon sitting EOB patient desaturated to low 80s but increased to mid 80s with encouragement to perform efficient breathing. while performing ADL tasks standing/seated patient desaturated to low 80s and was placed on non-rebreather mask (15 L) additionally until sats increased to high 80s. without rebreather mask patient briefly un uppers 60s and RN present to replace non-rebreather. with sats in upper 80s mask left on for remained of session   Activity Tolerance   Activity Tolerance Treatment limited secondary to medical complications (Comment)  (hypoxia)   Medical Staff Made Aware Co-treatment with occupational therapy as performed today. This patient's participation in therapy services was limited by decreased tolerance for activity and current medical status. For these reasons, co-treatment was performed so that patient could optimally participate in therapy services and maximize their rehabilitation during treatment time. PT and OT disciplines addressed separate goals of patient's individualized care plan.   Nurse Made Aware cassie to see per MARSHA Lion   Neuro re-ed 3 sit<-->stand transfers   Balance training  unsupported sitting EOB x 10-12 minutes with supervision   Assessment   Prognosis Good   Problem List Decreased strength;Decreased endurance;Impaired balance;Decreased mobility   Assessment PT initiated treatment session in order to assist patient in achieving goals to improve transfers and overall activity tolerance. Patient's  overall participation in functional mobility tasks is limited by respiratory status (patient desaturated to upper 60s and required ongoing HFNC + non-rebreather mask) to maintains sats in 80s. She requires CG assist for transfers and short distance ambulation (3 feet bed to chair) with VC to initiate seated rest breaks to recover breathing. PT d/c recommendation remains for HHPT at this time. Patient will continue to benefit from continued skilled PT this admission to achieve maximal function and safety.   Goals   Patient Goals to go home   STG Expiration Date 02/13/24   PT Treatment Day 6   Plan   Treatment/Interventions Functional transfer training;LE strengthening/ROM;Therapeutic exercise;Endurance training;Patient/family training;Equipment eval/education;Bed mobility;Gait training;OT;Spoke to nursing;Elevations   Progress Progressing toward goals   PT Frequency 2-3x/wk  (will increase frequency as patient's ability to participate improves (limited due to respiratory status at this time))   Discharge Recommendation   Rehab Resource Intensity Level, PT III (Minimum Resource Intensity)   Equipment Recommended Walker   AM-PAC Basic Mobility Inpatient   Turning in Flat Bed Without Bedrails 4   Lying on Back to Sitting on Edge of Flat Bed Without Bedrails 4   Moving Bed to Chair 3   Standing Up From Chair Using Arms 3   Walk in Room 3   Climb 3-5 Stairs With Railing 2   Basic Mobility Inpatient Raw Score 19   Basic Mobility Standardized Score 42.48   Highest Level Of Mobility   JH-HLM Goal 6: Walk 10 steps or more   JH-HLM Achieved 5: Stand (1 or more minutes)     The patient's AM-PAC Basic Mobility Inpatient Standardized Score is less than 42.9, suggesting this patient may benefit from discharge to post-acute rehabilitation services. Please also refer to the recommendation of the Physical Therapist for safe discharge planning.    DOREEN THOMAS PT, DPT

## 2024-02-05 NOTE — PLAN OF CARE
Problem: PHYSICAL THERAPY ADULT  Goal: Performs mobility at highest level of function for planned discharge setting.  See evaluation for individualized goals.  Description:    Equipment Recommended:  (none)       See flowsheet documentation for full assessment, interventions and recommendations.  Outcome: Progressing  Note: Prognosis: Good  Problem List: Decreased strength, Decreased endurance, Impaired balance, Decreased mobility  Assessment: PT initiated treatment session in order to assist patient in achieving goals to improve transfers and overall activity tolerance. Patient's overall participation in functional mobility tasks is limited by respiratory status (patient desaturated to upper 60s and required ongoing HFNC + non-rebreather mask) to maintains sats in 80s. She requires CG assist for transfers and short distance ambulation (3 feet bed to chair) with VC to initiate seated rest breaks to recover breathing. PT d/c recommendation remains for HHPT at this time. Patient will continue to benefit from continued skilled PT this admission to achieve maximal function and safety.  Barriers to Discharge: None     Rehab Resource Intensity Level, PT: III (Minimum Resource Intensity)    See flowsheet documentation for full assessment.

## 2024-02-05 NOTE — PROGRESS NOTES
[facility]  Progress Note: Critical Care  Name: Calra Hunt 58 y.o. female I MRN: [id number]  Unit/Bed#: ICCU 207-01 I Date of Admission: 1/10/2024   Date of Service: 2/5/2024 I Hospital Day: 26    Assessment/Plan   Neuro:   Diagnosis: seizure disorder  S/p partial left temporal lobe resection in 2007  Contuinue home lamictal 150 bid and topamax 100 bid  Diagnosis: anxiety  Continue home effexor  Seroquel 25 hs and melatonin 6 hs for sleep     CV:   No active issues     Pulm:  Diagnosis: acute hypoxic respiratory failure   Tested COVID positive on 1/7  Completed severe COVID pathway and 7 days CTX  Initially doing well with decreasing O2 requirements after transfer out of ICU, but pulm consulted 1/26 for increasing O2 requirements as of 1/24  Steroids restarted for taper due to abrupt cessation upon completion of COVID pathway   Morning of 1/28 pt requiring HFNC 100% so was upgraded to SD1 under critical care, downgraded to SD2 on 1/30 but continued to have high O2 requirements so was upgraded to CC again 2/1  Imaging findings with diffuse GGO and multifocal consolidations as well as bronchial dilation - concern that this could represent fibrosis 2/2 rituxan, diffuse pattern not as consistent with post-COVID fibrosis. 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) though unclear  how this was diagnosed, this also does not enirely fit as she is NOT hypokalemic  Cr downtrending now   Continue to monitor I/O and UOP  Patient responds to fluid boluses      F/E/N:   F: none, boluses as indicated   E: monitor and replete for goal K>4, P>3, Mg>2  N: regular house      Heme/Onc:   Diagnosis: B cell lymphoma  Follows with heme/onc at [facility]  On rituxan for 2 year course, started May 2022  Last dose was 12/20, treatment currently on hold   Leukopenic on admission but WBC now wnl  DVT ppx with lovenox      Endo:   Diagnosis: steroid hyperglycemia   SSI, hypoglycemic protocol  Goal -180     ID:   Diagnosis: COVID pneumonia vs tracheobronchitis  Completed severe COVID pathway with decadron (ended 1/22) and remdesivir (ended 1/20), also completed 7 days CXT on 1/15  C/f opportunistic infections given immunocompromised status on chemotherapy and recent steroid use  No consolidations on CXR and procal negative  S/p bactrim and minocycline x5 days  Bronc on 2/2 - Cx w/o growth (initially resulted as 1+ alpha hemolytic strep), AFB & PCP negative, f/u fungal, legionella, and viral cultures   Now on ampicillin 2g q6h, will discuss with ID about d/c'ing as cultures now stating no growth  ID following, appreciate recommendations   Continues on prednisone 60 as noted above for suspected pneumonitis in the setting of rituxin vs post-COVID     MSK/Skin:   No active issues    Disposition: Critical care    ICU Core Measures     A: Assess, Prevent, and Manage Pain Has pain been assessed? Yes  Need for changes to pain regimen? No   B: Both SAT/SAT  N/A   C: Choice of Sedation RASS Goal: 0 Alert and Calm  Need for changes to sedation or analgesia regimen? No   D: Delirium CAM-ICU: Negative   E: Early Mobility  Plan for early mobility? Yes   F: Family Engagement Plan for family engagement today? Yes       Antibiotic Review: will discuss with ID    Review of Invasive Devices:            Prophylaxis:  VTE VTE covered  by:  enoxaparin, Subcutaneous, 40 mg at 02/04/24 0849       Stress Ulcer  not ordered        Significant 24hr Events     24hr events: No events overnight. Pt feeling well today with no new complaints.      Subjective     Review of Systems   Constitutional:  Negative for appetite change, chills and fever.   HENT:  Negative for congestion.    Respiratory:  Negative for shortness of breath.    Cardiovascular:  Negative for chest pain.   Gastrointestinal:  Negative for abdominal pain and nausea.        Frequent stools        Objective                            Vitals I/O      Most Recent Min/Max in 24hrs   Temp 98.1 °F (36.7 °C) Temp  Min: 97.8 °F (36.6 °C)  Max: 98.4 °F (36.9 °C)   Pulse 76 Pulse  Min: 76  Max: 118   Resp 20 Resp  Min: 20  Max: 25   BP 98/62 BP  Min: 74/42  Max: 108/61   O2 Sat 93 % SpO2  Min: 93 %  Max: 96 %      Intake/Output Summary (Last 24 hours) at 2/5/2024 0816  Last data filed at 2/4/2024 2250  Gross per 24 hour   Intake 2020 ml   Output 1200 ml   Net 820 ml       Diet Regular; Regular House    Invasive Monitoring   None         Physical Exam   Physical Exam  Eyes:      General:         Right eye: No discharge.         Left eye: No discharge.      Conjunctiva/sclera: Conjunctivae normal.   Skin:     General: Skin is warm and dry.   HENT:      Head: Normocephalic and atraumatic.      Right Ear: No drainage.      Left Ear: No drainage.      Nose: Epistaxis present.      Mouth/Throat:      Mouth: Mucous membranes are moist.   Cardiovascular:      Rate and Rhythm: Normal rate and regular rhythm.   Musculoskeletal:      Right lower leg: No edema.      Left lower leg: No edema.   Abdominal: General: Bowel sounds are normal. There is no distension.      Palpations: Abdomen is soft.      Tenderness: There is no abdominal tenderness. There is no guarding.   Constitutional:       General: She is not in acute distress.     Interventions: She is not sedated and not intubated.  Pulmonary:      Effort:  Pulmonary effort is normal. No respiratory distress. She is not intubated.      Breath sounds: Rales (bilateral bases) present.   Neurological:      Mental Status: She is alert and oriented to person, place and time. She is calm.   Genitourinary/Anorectal:  external catheter present.          Diagnostic Studies      EKG: no new  Imaging: no new      Medications:  Scheduled PRN   ampicillin, 2,000 mg, Q6H  chlorhexidine, 15 mL, Q12H SARTHAK  enoxaparin, 40 mg, Daily  insulin lispro, 1-5 Units, 4x Daily (AC & HS)  lamoTRIgine, 150 mg, BID  melatonin, 6 mg, HS  nystatin, 500,000 Units, 4x Daily  polyethylene glycol, 17 g, Daily  predniSONE, 60 mg, Daily  QUEtiapine, 25 mg, HS  senna-docusate sodium, 2 tablet, BID  topiramate, 100 mg, BID  venlafaxine, 25 mg, TID With Meals      acetaminophen, 650 mg, Q6H PRN  bisacodyl, 10 mg, Daily PRN  calcium carbonate, 500 mg, BID PRN  metoprolol, 2.5 mg, Q6H PRN  ondansetron, 4 mg, Q6H PRN  sodium chloride, 1 spray, Q1H PRN       Continuous          Labs:    CBC    Recent Labs     02/04/24  0559   WBC 5.92   HGB 11.9   HCT 36.6        BMP    Recent Labs     02/04/24  0559   SODIUM 140   K 4.6   *   CO2 21   AGAP 9   BUN 40*   CREATININE 1.12   CALCIUM 9.8       Coags    No recent results     Additional Electrolytes  Recent Labs     02/04/24  0559   MG 1.9   PHOS 3.2          Blood Gas    No recent results  No recent results LFTs  No recent results    Infectious  No recent results  Glucose  Recent Labs     02/04/24  0559   GLUC 72                   Risa Handley MD

## 2024-02-05 NOTE — UTILIZATION REVIEW
Continued Stay Review    Date: 2-5-24                           Current Patient Class: inpatient  Current Level of Care:  critical care    HPI:58 y.o. female initially admitted on 1-10-24      Assessment/Plan:     Patient remains on 45 > 50L O2 via high flow nasal cannula.  Tachycardia and hypotension persists.  Continue high dose iv solumedrol 1000 mg. Continue PT services for functional deficits.        Vital Signs:       Patient Vitals for the past 24 hrs:   BP Temp Pulse Resp SpO2 Weight   02/05/24 1145 101/72 (!) 97.4 °F (36.3 °C) (!) 112 (!) 27 92 % --   02/05/24 0800 98/65 -- 96 (!) 11 91 % --   02/05/24 0730 96/67 98.5 °F (36.9 °C) 96 22 94 % --   02/05/24 0600 -- -- -- -- -- 74.4 kg (164 lb 0.4 oz)   02/05/24 0400 -- -- -- -- 93 % --   02/05/24 0330 98/62 98.1 °F (36.7 °C) 76 20 94 % --   02/04/24 2305 95/61 98.4 °F (36.9 °C) (!) 110 22 95 % --   02/04/24 2122 108/61 -- (!) 116 21 -- --   02/04/24 2035 -- -- -- -- 93 % --   02/04/24 1930 (!) 78/48 -- (!) 114 (!) 24 93 % --   02/04/24 1915 (!) 74/42 98 °F (36.7 °C) (!) 118 (!) 24 94 % --   02/04/24 1645 98/64 -- 100 21 95 % --   02/04/24 1505 91/58 98.3 °F (36.8 °C) 100 22 94 % --        Pertinent Labs/Diagnostic Results:       Results from last 7 days   Lab Units 02/04/24  0559 02/03/24  0446 02/02/24  0706 02/01/24  1447 01/30/24  0630   WBC Thousand/uL 5.92 8.31 6.75 10.98* 6.32   HEMOGLOBIN g/dL 11.9 13.6 13.3 13.7 11.8   HEMATOCRIT % 36.6 41.9 41.9 43.1 36.3   PLATELETS Thousands/uL 195 240 247 240 188         Results from last 7 days   Lab Units 02/04/24  0559 02/03/24  0446 02/02/24  0706 02/01/24  1447 01/30/24  0630   SODIUM mmol/L 140 137 140 136 140   POTASSIUM mmol/L 4.6 5.0 4.9 5.0 4.4   CHLORIDE mmol/L 110* 109* 109* 105 108   CO2 mmol/L 21 18* 20* 21 24   ANION GAP mmol/L 9 10 11 10 8   BUN mg/dL 40* 47* 44* 53* 35*   CREATININE mg/dL 1.12 1.35* 1.26 1.45* 1.34*   EGFR ml/min/1.73sq m 54 43 47 40 44   CALCIUM mg/dL 9.8 10.1 10.0 10.3* 9.8    MAGNESIUM mg/dL 1.9 2.0 2.0 2.1 2.2   PHOSPHORUS mg/dL 3.2 3.5 4.0 4.5 3.0         Results from last 7 days   Lab Units 02/05/24  1142 02/05/24  0846 02/05/24  0805 02/04/24  2110 02/04/24  1621 02/04/24  1121 02/04/24  0740 02/03/24  2100 02/03/24  1606 02/03/24  1053 02/03/24  0815 02/02/24  2118   POC GLUCOSE mg/dl 114 85 65 297* 168* 100 71 173* 172* 96 73 173*     Results from last 7 days   Lab Units 02/04/24  0559 02/03/24  0446 02/02/24  0706 02/01/24  1447 01/30/24  0630   GLUCOSE RANDOM mg/dL 72 93 81 137 75     Results from last 7 days   Lab Units 01/31/24  1259   CRP mg/L 5.6*     Results from last 7 days   Lab Units 02/01/24  1859   CLARITY UA  Clear   COLOR UA  Light Yellow   SPEC GRAV UA  1.012   PH UA  6.5   GLUCOSE UA mg/dl Negative   KETONES UA mg/dl Negative   BLOOD UA  Negative   PROTEIN UA mg/dl Negative   NITRITE UA  Negative   BILIRUBIN UA  Negative   UROBILINOGEN UA (BE) mg/dl <2.0   LEUKOCYTES UA  Negative   WBC UA /hpf 2-4*   RBC UA /hpf None Seen   BACTERIA UA /hpf None Seen   EPITHELIAL CELLS WET PREP /hpf Occasional     Results from last 7 days   Lab Units 02/02/24  0941 02/02/24  0940 02/02/24  0938   GRAM STAIN RESULT  1+ Polys  No bacteria seen No Polys or Bacteria seen Rare Polys  No organisms seen       Scheduled Medications:    chlorhexidine, 15 mL, Mouth/Throat, Q12H SARTHAK  enoxaparin, 40 mg, Subcutaneous, Daily  insulin lispro, 1-5 Units, Subcutaneous, 4x Daily (AC & HS)  lamoTRIgine, 150 mg, Oral, BID  melatonin, 6 mg, Oral, HS  methylPREDNISolone sodium succinate, 1,000 mg, Intravenous, Daily   Followed by  [START ON 2/8/2024] predniSONE, 80 mg, Oral, Daily  nystatin, 500,000 Units, Swish & Spit, 4x Daily  polyethylene glycol, 17 g, Oral, Daily  QUEtiapine, 12.5 mg, Oral, HS  topiramate, 100 mg, Oral, BID  venlafaxine, 25 mg, Oral, TID With Meals      Continuous IV Infusions:     PRN Meds:  acetaminophen, 650 mg, Oral, Q6H PRN  bisacodyl, 10 mg, Rectal, Daily PRN  calcium  carbonate, 500 mg, Oral, BID PRN  metoprolol, 2.5 mg, Intravenous, Q6H PRN  ondansetron, 4 mg, Intravenous, Q6H PRN  senna-docusate sodium, 2 tablet, Oral, BID PRN  sodium chloride, 1 spray, Each Nare, Q1H PRN        Discharge Plan: to be determined     Network Utilization Review Department  ATTENTION: Please call with any questions or concerns to [phone] and carefully listen to the prompts so that you are directed to the right person. All voicemails are confidential.   For Discharge needs, contact Care Management DC Support Team at [phone] opt. 2  Send all requests for admission clinical reviews, approved or denied determinations and any other requests to dedicated fax number below belonging to the campus where the patient is receiving treatment. List of dedicated fax numbers for the Facilities:  FACILITY NAME UR FAX NUMBER   ADMISSION DENIALS (Administrative/Medical Necessity) [phone]   DISCHARGE SUPPORT TEAM (NETWORK) [phone]   PARENT CHILD HEALTH (Maternity/NICU/Pediatrics) [phone]   [facility] [phone]   [facility] [phone]   [facility] [phone]   [facility] [phone]   [facility] [phone]   [facility] [phone]   [facility] [phone]   [facility] [phone]   [facility] [phone]   [facility] [phone]   [facility] [phone]   [facility] [phone]

## 2024-02-05 NOTE — OCCUPATIONAL THERAPY NOTE
Occupational Therapy Treatment Note      Carla Marilee    2024    Principal Problem:    Acute respiratory failure with hypoxia (HCC)  Active Problems:    Anxiety state    COVID    Stage 3b chronic kidney disease (CKD) (HCC)    Teto marginal zone B-cell lymphoma (HCC)    Seizure disorder (HCC)    Viral pneumonia      Past Medical History:   Diagnosis Date    Hx of hypercalcemia     Lymphoma (HCC) 2021    Parathyroid disease (HCC)     Seizures (HCC)     Situational depression     24 yr old son  from drug overdose       Past Surgical History:   Procedure Laterality Date    CRANIOTOMY FOR TEMPORAL LOBECTOMY Left 24 1101   OT Last Visit   OT Visit Date 24   Note Type   Note Type Treatment   Pain Assessment   Pain Assessment Tool 0-10   Pain Score No Pain   Restrictions/Precautions   Weight Bearing Precautions Per Order No   Other Precautions Bed Alarm;Chair Alarm;Multiple lines;Telemetry;O2;Fall Risk;Pain  (HFNC and NRB)   Lifestyle   Autonomy I adls and mobility - i iadls - shares homemaking with family   Reciprocal Relationships supportive family   Service to Others volunteers at [employer] organization   Intrinsic Gratification sedentary   ADL   Where Assessed Edge of bed   UB Bathing Assistance 5  Supervision/Setup   UB Bathing Deficit Setup;Verbal cueing;Supervision/safety;Increased time to complete;Right arm;Left arm   LB Bathing Assistance 4  Minimal Assistance   LB Bathing Deficit Setup;Steadying;Verbal cueing;Supervision/safety;Increased time to complete;Perineal area;Right upper leg;Buttocks;Left upper leg;Right lower leg including foot;Left lower leg including foot   LB Bathing Comments bathed LB in standing; CGA for standing during dynamic reach   UB Dressing Assistance 5  Supervision/Setup   UB Dressing Deficit Setup;Verbal cueing;Supervision/safety;Increased time to complete;Thread RUE;Thread LUE;Pull around back   UB Dressing Comments don/doffed gown   LB Dressing  Assistance 4  Minimal Assistance   LB Dressing Deficit Setup;Thread RLE into pants;Thread LLE into pants;Thread RLE into underwear;Thread LLE into underwear;Fasteners;Pull up over hips   LB Dressing Comments doffed underwear w/ S; donned new underwear and pants. Assist 2/2 poor respiratory status w/ activity.   Functional Standing Tolerance   Time 2 mins   Activity LB ADLS   Comments CGA in standing; no DME used   Bed Mobility   Supine to Sit 5  Supervision   Additional items HOB elevated;Increased time required;Verbal cues   Sit to Supine Unable to assess   Additional Comments sat EOB for approx 15 mins w/ Fair balance/trunk control   Transfers   Sit to Stand 4  Minimal assistance   Additional items Assist x 1;Increased time required;Verbal cues   Stand to Sit 5  Supervision   Additional items Verbal cues;Increased time required   Additional Comments no AD/DME used; x3 trials   Functional Mobility   Functional Mobility 4  Minimal assistance   Additional Comments pt took few small steps from EOB to recliner w/ Min A x1; no DME used   Cognition   Overall Cognitive Status Impaired   Arousal/Participation Responsive;Cooperative   Attention Within functional limits   Orientation Level Oriented X4   Memory Within functional limits   Following Commands Follows all commands and directions without difficulty   Comments pt is pleasant and cooperative; needs occasional cues for safety   Activity Tolerance   Activity Tolerance Patient limited by fatigue;Other (Comment)  (poor respiratory status w/ activity)   Medical Staff Made Aware PT, RN   Assessment   Assessment Pt seen for skilled OT treatment session today w/ focus on bed mobility, ADLs, standing tolerance, sitting/standing balance, dynamic reach, endurance, deep breathing strategies, energy conservation techniques, functional mobility and activity tolerance. Pt overall requires S-Min A for functional tasks. Pt is limited by drop in respiratory status w/ any activity. Pt  on 45>50L HFNC & NRB. O2 sat fluctuating throughout session, mostly around 84-88% but occasionally dropped into high 60s. Pt not notably significantly SOB but pt requiring frequent rest breaks and deep breathing to maintain O2 in high 80s. Pt required increased assist w/ ADL tasks 2/2 continual drop in O2 w/ activity. Pt would be able to demonstrate higher level of independence if able to tolerate longer amounts of functional tasks w/o becoming fatigued. Pt required no use of DME. Left seated OOB in chair at end of session w/ all needs in reach and RN notified. Pt remains limited by fatigue, SOB, activity tolerance, functional mobility, endurance and decreased I w/ ADLS. Continues to benefit from immediate acute skilled OT services. Will continue to follow to work towards independence and previously established goals.   Plan   Treatment Interventions ADL retraining;Functional transfer training;UE strengthening/ROM;Endurance training;Cognitive reorientation;Patient/family training;Equipment evaluation/education;Compensatory technique education;Continued evaluation;Energy conservation;Activityengagement   Goal Expiration Date 02/13/24   OT Treatment Day 4   OT Frequency 3-5x/wk   Discharge Recommendation   Rehab Resource Intensity Level, OT III (Minimum Resource Intensity)   Additional Comments  The patient's raw score on the AM-PAC Daily Activity Inpatient Short Form is 19. A raw score of greater than or equal to 19 suggests the patient may benefit from discharge to home. Please refer to the recommendation of the Occupational Therapist for safe discharge planning   Additional Comments 2 Pt seen as a co-session due to the patient's co-morbidities, clinically unstable presentation, and present impairments which are a regression from the patient's baseline.   [facility] Daily Activity Inpatient   Lower Body Dressing 3   Bathing 3   Toileting 3   Upper Body Dressing 3   Grooming 3   Eating 4   Daily Activity Raw Score 19    Daily Activity Standardized Score (Calc for Raw Score >=11) 40.22   AM-PAC Applied Cognition Inpatient   Following a Speech/Presentation 3   Understanding Ordinary Conversation 4   Taking Medications 4   Remembering Where Things Are Placed or Put Away 4   Remembering List of 4-5 Errands 4   Taking Care of Complicated Tasks 3   Applied Cognition Raw Score 22   Applied Cognition Standardized Score 47.83   End of Consult   Education Provided Yes;Family or social support of family present for education by provider   Patient Position at End of Consult Bedside chair;All needs within reach   Nurse Communication Nurse aware of consult       Felisha Wilkerson MS, OTR/L

## 2024-02-06 LAB
ANION GAP SERPL CALCULATED.3IONS-SCNC: 8 MMOL/L
BASOPHILS # BLD MANUAL: 0 THOUSAND/UL (ref 0–0.1)
BASOPHILS NFR MAR MANUAL: 0 % (ref 0–1)
BUN SERPL-MCNC: 29 MG/DL (ref 5–25)
CALCIUM SERPL-MCNC: 9.5 MG/DL (ref 8.4–10.2)
CHLORIDE SERPL-SCNC: 112 MMOL/L (ref 96–108)
CO2 SERPL-SCNC: 23 MMOL/L (ref 21–32)
CREAT SERPL-MCNC: 0.99 MG/DL (ref 0.6–1.3)
EOSINOPHIL # BLD MANUAL: 0 THOUSAND/UL (ref 0–0.4)
EOSINOPHIL NFR BLD MANUAL: 0 % (ref 0–6)
ERYTHROCYTE [DISTWIDTH] IN BLOOD BY AUTOMATED COUNT: 18.2 % (ref 11.6–15.1)
GFR SERPL CREATININE-BSD FRML MDRD: 63 ML/MIN/1.73SQ M
GIANT PLATELETS BLD QL SMEAR: PRESENT
GLUCOSE SERPL-MCNC: 141 MG/DL (ref 65–140)
GLUCOSE SERPL-MCNC: 153 MG/DL (ref 65–140)
GLUCOSE SERPL-MCNC: 158 MG/DL (ref 65–140)
GLUCOSE SERPL-MCNC: 281 MG/DL (ref 65–140)
GLUCOSE SERPL-MCNC: 328 MG/DL (ref 65–140)
GLUCOSE SERPL-MCNC: 330 MG/DL (ref 65–140)
HCT VFR BLD AUTO: 36.3 % (ref 34.8–46.1)
HGB BLD-MCNC: 11.5 G/DL (ref 11.5–15.4)
LYMPHOCYTES # BLD AUTO: 0 % (ref 14–44)
LYMPHOCYTES # BLD AUTO: 0 THOUSAND/UL (ref 0.6–4.47)
MAGNESIUM SERPL-MCNC: 1.9 MG/DL (ref 1.9–2.7)
MCH RBC QN AUTO: 29.8 PG (ref 26.8–34.3)
MCHC RBC AUTO-ENTMCNC: 31.7 G/DL (ref 31.4–37.4)
MCV RBC AUTO: 94 FL (ref 82–98)
METAMYELOCYTES NFR BLD MANUAL: 1 % (ref 0–1)
MONOCYTES # BLD AUTO: 0.1 THOUSAND/UL (ref 0–1.22)
MONOCYTES NFR BLD: 2 % (ref 4–12)
MYCOBACTERIUM SPEC CULT: NORMAL
MYCOBACTERIUM SPEC CULT: NORMAL
NEUTROPHILS # BLD MANUAL: 4.92 THOUSAND/UL (ref 1.85–7.62)
NEUTS BAND NFR BLD MANUAL: 9 % (ref 0–8)
NEUTS SEG NFR BLD AUTO: 88 % (ref 43–75)
NRBC BLD AUTO-RTO: 1 /100 WBC (ref 0–2)
OVALOCYTES BLD QL SMEAR: PRESENT
PHOSPHATE SERPL-MCNC: 3.7 MG/DL (ref 2.7–4.5)
PLATELET # BLD AUTO: 162 THOUSANDS/UL (ref 149–390)
PLATELET BLD QL SMEAR: ADEQUATE
PMV BLD AUTO: 11.8 FL (ref 8.9–12.7)
POIKILOCYTOSIS BLD QL SMEAR: PRESENT
POLYCHROMASIA BLD QL SMEAR: PRESENT
POTASSIUM SERPL-SCNC: 4.3 MMOL/L (ref 3.5–5.3)
RBC # BLD AUTO: 3.86 MILLION/UL (ref 3.81–5.12)
RBC MORPH BLD: PRESENT
RHODAMINE-AURAMINE STN SPEC: NORMAL
RHODAMINE-AURAMINE STN SPEC: NORMAL
SODIUM SERPL-SCNC: 143 MMOL/L (ref 135–147)
WBC # BLD AUTO: 5.07 THOUSAND/UL (ref 4.31–10.16)

## 2024-02-06 PROCEDURE — 85007 BL SMEAR W/DIFF WBC COUNT: CPT

## 2024-02-06 PROCEDURE — NC001 PR NO CHARGE: Performed by: INTERNAL MEDICINE

## 2024-02-06 PROCEDURE — 85027 COMPLETE CBC AUTOMATED: CPT

## 2024-02-06 PROCEDURE — 99233 SBSQ HOSP IP/OBS HIGH 50: CPT | Performed by: INTERNAL MEDICINE

## 2024-02-06 PROCEDURE — 94760 N-INVAS EAR/PLS OXIMETRY 1: CPT

## 2024-02-06 PROCEDURE — 94664 DEMO&/EVAL PT USE INHALER: CPT

## 2024-02-06 PROCEDURE — 82948 REAGENT STRIP/BLOOD GLUCOSE: CPT

## 2024-02-06 PROCEDURE — 94761 N-INVAS EAR/PLS OXIMETRY MLT: CPT

## 2024-02-06 PROCEDURE — 83735 ASSAY OF MAGNESIUM: CPT

## 2024-02-06 PROCEDURE — 88112 CYTOPATH CELL ENHANCE TECH: CPT | Performed by: PATHOLOGY

## 2024-02-06 PROCEDURE — 80048 BASIC METABOLIC PNL TOTAL CA: CPT

## 2024-02-06 PROCEDURE — 84100 ASSAY OF PHOSPHORUS: CPT

## 2024-02-06 RX ADMIN — QUETIAPINE FUMARATE 12.5 MG: 25 TABLET ORAL at 22:11

## 2024-02-06 RX ADMIN — INSULIN LISPRO 1 UNITS: 100 INJECTION, SOLUTION INTRAVENOUS; SUBCUTANEOUS at 22:12

## 2024-02-06 RX ADMIN — NYSTATIN 500000 UNITS: 100000 SUSPENSION ORAL at 09:37

## 2024-02-06 RX ADMIN — CHLORHEXIDINE GLUCONATE 15 ML: 1.2 SOLUTION ORAL at 19:55

## 2024-02-06 RX ADMIN — ACETAMINOPHEN 650 MG: 325 TABLET, FILM COATED ORAL at 09:44

## 2024-02-06 RX ADMIN — LAMOTRIGINE 150 MG: 100 TABLET ORAL at 19:54

## 2024-02-06 RX ADMIN — VENLAFAXINE 25 MG: 25 TABLET ORAL at 17:14

## 2024-02-06 RX ADMIN — INSULIN LISPRO 1 UNITS: 100 INJECTION, SOLUTION INTRAVENOUS; SUBCUTANEOUS at 14:29

## 2024-02-06 RX ADMIN — TOPIRAMATE 100 MG: 100 TABLET, FILM COATED ORAL at 17:15

## 2024-02-06 RX ADMIN — NYSTATIN 500000 UNITS: 100000 SUSPENSION ORAL at 17:14

## 2024-02-06 RX ADMIN — NYSTATIN 500000 UNITS: 100000 SUSPENSION ORAL at 14:28

## 2024-02-06 RX ADMIN — TOPIRAMATE 100 MG: 100 TABLET, FILM COATED ORAL at 09:39

## 2024-02-06 RX ADMIN — VENLAFAXINE 25 MG: 25 TABLET ORAL at 14:29

## 2024-02-06 RX ADMIN — MELATONIN 6 MG: at 19:52

## 2024-02-06 RX ADMIN — CHLORHEXIDINE GLUCONATE 15 ML: 1.2 SOLUTION ORAL at 09:38

## 2024-02-06 RX ADMIN — LAMOTRIGINE 150 MG: 100 TABLET ORAL at 09:41

## 2024-02-06 RX ADMIN — SODIUM CHLORIDE 1000 MG: 0.9 INJECTION, SOLUTION INTRAVENOUS at 09:45

## 2024-02-06 RX ADMIN — Medication 1 SPRAY: at 09:45

## 2024-02-06 RX ADMIN — INSULIN LISPRO 4 UNITS: 100 INJECTION, SOLUTION INTRAVENOUS; SUBCUTANEOUS at 18:21

## 2024-02-06 RX ADMIN — ENOXAPARIN SODIUM 40 MG: 40 INJECTION SUBCUTANEOUS at 09:38

## 2024-02-06 RX ADMIN — VENLAFAXINE 25 MG: 25 TABLET ORAL at 09:40

## 2024-02-06 NOTE — PROGRESS NOTES
[facility]  Progress Note: Critical Care  Name: Carla Hunt 59 y.o. female I MRN: [id number]  Unit/Bed#: [facility] 207-01 I Date of Admission: 1/10/2024   Date of Service: 2/6/2024 I Hospital Day: 27    Assessment/Plan   Neuro:   Diagnosis: seizure disorder  S/p partial left temporal lobe resection in 2007  Contuinue home lamictal 150 bid and topamax 100 bid  Diagnosis: anxiety  Continue home effexor  Seroquel 25 hs and melatonin 6 hs for sleep     CV:   No active issues     Pulm:  Diagnosis: acute hypoxic respiratory failure   Tested COVID positive on 1/7  Completed severe COVID pathway and 7 days CTX  Initially doing well with decreasing O2 requirements after transfer out of ICU, but pulm consulted 1/26 for increasing O2 requirements as of 1/24  Steroids restarted for taper due to abrupt cessation upon completion of COVID pathway   Morning of 1/28 pt requiring HFNC 100% so was upgraded to SD1 under critical care, downgraded to SD2 on 1/30 but continued to have high O2 requirements so was upgraded to CC again 2/1  Imaging findings with diffuse GGO and multifocal consolidations as well as bronchial dilation - concern that this could represent fibrosis 2/2 rituxan, diffuse pattern not as consistent with post-COVID fibrosis. Negative PE study  Was on MFNC 10L this morning briefly but then desaturated and went back on HFNC 50L 50%  Continue to wean supplemental O2 as tolerated for SpO2 >90%  S/p Bronch 2/2   NG on cultures (initially showed non-group A strep)  PCP and AFB negative   Legionella, viral, fungal cultures pending   Repeat CT chest similar   Pulse dose steroids with solumedrol 1g daily x3 days (through 2/7) then prednisone 80mg daily  See remainder of plan under ID     GI:   No active issues  Bowel regimen with daily miralax and bid senna      :   Diagnosis: CKD III  Baseline Cr appears to be 0.8-1.2  UA unremarkable   Upon chart review pt has reported h/o Nona  syndrome (hyperaldosteronism) though unclear how this was diagnosed, this also does not enirely fit as she is NOT hypokalemic  Cr downtrending now   Continue to monitor I/O and UOP  Patient responds to fluid boluses      F/E/N:   F: none, boluses as indicated   E: monitor and replete for goal K>4, P>3, Mg>2  N: regular house      Heme/Onc:   Diagnosis: B cell lymphoma  Follows with heme[facility]c at [facility]  On rituxan for 2 year course, started May 2022  Last dose was 12/20, treatment currently on hold   Leukopenic on admission but WBC now wnl  DVT ppx with lovenox      Endo:   Diagnosis: steroid hyperglycemia   SSI, hypoglycemic protocol  Goal -180     ID:   Diagnosis: COVID pneumonia vs tracheobronchitis  Completed severe COVID pathway with decadron (ended 1/22) and remdesivir (ended 1/20), also completed 7 days CXT on 1/15  C/f opportunistic infections given immunocompromised status on chemotherapy and recent steroid use  No consolidations on CXR and procal negative  S/p bactrim and minocycline x5 days  Bronc on 2/2 - Cx w/o growth (initially resulted as 1+ alpha hemolytic strep), AFB & PCP negative, f/u fungal, legionella, and viral cultures   Monitoring off abx as cultures showing no growth  ID following, appreciate recommendations   Increased to pulse dose steroids as noted above for suspected pneumonitis in the setting of rituxin vs post-COVID     MSK/Skin:   No active issues    Disposition: Stepdown Level 1    ICU Core Measures     A: Assess, Prevent, and Manage Pain Has pain been assessed? Yes  Need for changes to pain regimen? No   B: Both SAT/SAT  N/A   C: Choice of Sedation RASS Goal: 0 Alert and Calm  Need for changes to sedation or analgesia regimen? No   D: Delirium CAM-ICU: Negative   E: Early Mobility  Plan for early mobility? Yes   F: Family Engagement Plan for family engagement today? Yes       Review of Invasive Devices:            Prophylaxis:  VTE VTE covered by:  enoxaparin, Subcutaneous, 40 mg  at 02/05/24 0839       Stress Ulcer  not ordered        Significant 24hr Events     24hr events: No events overnight. Pt feels well today with no new complaints.      Subjective     Review of Systems   Constitutional:  Negative for appetite change and fever.   HENT:  Negative for congestion.    Respiratory:  Negative for shortness of breath.    Cardiovascular:  Negative for chest pain.   Gastrointestinal:  Negative for abdominal pain.        Objective                            Vitals I/O      Most Recent Min/Max in 24hrs   Temp 97.6 °F (36.4 °C) Temp  Min: 97.4 °F (36.3 °C)  Max: 98.4 °F (36.9 °C)   Pulse 104 Pulse  Min: 66  Max: 120   Resp 19 Resp  Min: 16  Max: 27   /71 BP  Min: 101/72  Max: 118/80   O2 Sat 93 % SpO2  Min: 92 %  Max: 100 %      Intake/Output Summary (Last 24 hours) at 2/6/2024 0829  Last data filed at 2/5/2024 2225  Gross per 24 hour   Intake 476 ml   Output 650 ml   Net -174 ml       Diet Regular; Regular House    Invasive Monitoring   None         Physical Exam   Physical Exam  Eyes:      General:         Right eye: No discharge.         Left eye: No discharge.      Conjunctiva/sclera: Conjunctivae normal.   Skin:     General: Skin is warm and dry.   HENT:      Head: Normocephalic and atraumatic.      Right Ear: No drainage.      Left Ear: No drainage.      Nose: No congestion.      Mouth/Throat:      Mouth: Mucous membranes are moist.   Cardiovascular:      Rate and Rhythm: Normal rate and regular rhythm.   Musculoskeletal:      Right lower leg: No edema.      Left lower leg: No edema.   Abdominal: General: Bowel sounds are normal. There is no distension.      Palpations: Abdomen is soft.      Tenderness: There is no abdominal tenderness. There is no guarding.   Constitutional:       General: She is not in acute distress.     Interventions: She is not sedated and not intubated.  Pulmonary:      Effort: Pulmonary effort is normal. No respiratory distress. She is not intubated.      Breath  sounds: Rales (bilateral bases) present.   Neurological:      Mental Status: She is alert and oriented to person, place and time. Mental status is at baseline. She is calm.   Genitourinary/Anorectal:  external catheter present.          Diagnostic Studies      EKG: no new  Imaging: no new      Medications:  Scheduled PRN   chlorhexidine, 15 mL, Q12H SARTHAK  enoxaparin, 40 mg, Daily  insulin lispro, 1-5 Units, 4x Daily (AC & HS)  lamoTRIgine, 150 mg, BID  melatonin, 6 mg, HS  methylPREDNISolone sodium succinate, 1,000 mg, Daily   Followed by  [START ON 2/8/2024] predniSONE, 80 mg, Daily  nystatin, 500,000 Units, 4x Daily  polyethylene glycol, 17 g, Daily  QUEtiapine, 12.5 mg, HS  topiramate, 100 mg, BID  venlafaxine, 25 mg, TID With Meals      acetaminophen, 650 mg, Q6H PRN  bisacodyl, 10 mg, Daily PRN  calcium carbonate, 500 mg, BID PRN  metoprolol, 2.5 mg, Q6H PRN  ondansetron, 4 mg, Q6H PRN  senna-docusate sodium, 2 tablet, BID PRN  sodium chloride, 1 spray, Q1H PRN       Continuous          Labs:    CBC    Recent Labs     02/06/24  0454   WBC 5.07   HGB 11.5   HCT 36.3      BANDSPCT 9*     BMP    Recent Labs     02/06/24  0454   SODIUM 143   K 4.3   *   CO2 23   AGAP 8   BUN 29*   CREATININE 0.99   CALCIUM 9.5       Coags    No recent results     Additional Electrolytes  Recent Labs     02/06/24  0454   MG 1.9   PHOS 3.7          Blood Gas    No recent results  No recent results LFTs  No recent results    Infectious  No recent results  Glucose  Recent Labs     02/06/24  0454   GLUC 158*                   Risa Handley MD

## 2024-02-06 NOTE — PROGRESS NOTES
Progress Note - Infectious Disease   Carla Hunt 59 y.o. female MRN: [id number]  Unit/Bed#: [facility] 207-01 Encounter: [id number]      Impression/Recommendations:  Acute hypoxic respiratory failure, present on admission, secondary to mostly COVID but there was concern for concurrent bacterial pneumonia on admission due to elevated procalcitonin.  Therefore, patient completed treatment course for both COVID and bacterial pneumonia.  She was clinically improved with decreasing O2 requirement.  However, patient has developed worsening hypoxia over the last few days, acutely worse overnight, currently on high flow O2 support.  Chest CT more suggestive of COVID and postviral bacterial pneumonia.  Procalcitonin x 3 have been in the normal range.  Patient has no fever or leukocytosis.  She has minimal cough.  I suspect that her worsening hypoxia is still all secondary to inflammation from recent COVID.  Systemic corticosteroid was restarted.  Respiratory status is improved, most likely secondary to steroid restart.  Patient was treated with a 5-day course of Bactrim/doxycycline for stenotrophomonas in respiratory culture.  Given multiple normal procalcitonin, doubt pneumonia.  Consider tracheobronchitis..  Patient remains on O2 support but now weaned to mid flow.  Observe off further antibiotic.    Continue systemic corticosteroid.  Monitor temperature/WBC.  Monitor respiratory symptoms  Monitor O2 saturation and requirement.  Plan for O2 weaning per pulmonary service.     Severe COVID, present on admission.  Patient was treated with remdesivir, dexamethasone and was given 1 dose of Tocilizumab.  Given worsening hypoxic respiratory failure and still significant inflammation on chest CT, patient may benefit from another course of systemic corticosteroid.  Patient had 2 negative COVID antigen tests after treatment.  She was started back on systemic corticosteroids.  Hypoxia improving, with O2 support weaning.  No  additional antiviral needed.  Continue systemic corticosteroids.     CKD.  Creatinine improved.  Monitor creatinine.     Encephalopathy on admission.  This has resolved.  There was concern for possible seizure but no witnessed seizure and EEG did not capture it.  Monitor mental status.     Seizure disorder, status post temporal lobe resection in .     B-cell lymphoma, on chemotherapy, which is currently postponed.  Patient was leukopenic on admission but WBC now is near normal range.  Monitor WBC/ANC.     Discussed with patient in detail regarding the above plan.     Antibiotics:  Off antibiotic     Subjective:  Patient feels well.  Dyspnea improving every day.  Cough minimal now.  Temperature stays down.  No chills.  No diarrhea.     Objective:  Vitals:  Temp:  [97.4 °F (36.3 °C)-98.4 °F (36.9 °C)] 97.4 °F (36.3 °C)  HR:  [] 96  Resp:  [16-23] 19  BP: (103-118)/(62-80) 118/73  SpO2:  [93 %-100 %] 96 %  Temp (24hrs), Av.8 °F (36.6 °C), Min:97.4 °F (36.3 °C), Max:98.4 °F (36.9 °C)  Current: Temperature: (!) 97.4 °F (36.3 °C)    Physical Exam:     General: Awake, alert, cooperative, no distress.   Neck:  Supple. No mass.  No lymphadenopathy.   Lungs: Expansion symmetric, improve bilateral rhonchi, no rales, no wheezing, respirations less labored.   Heart:  Regular rate and rhythm, S1 and S2 normal, no murmur.   Abdomen: Soft, nondistended, non-tender, bowel sounds active all four quadrants, no masses, no organomegaly.   Extremities: Trace leg edema. No erythema/warmth. No ulcer. Nontender to palpation.   Skin:  No rash.   Neuro: Moves all extremities.     Invasive Devices       Peripheral Intravenous Line  Duration             Peripheral IV 24 Right;Upper;Ventral (anterior) Arm 4 days                    Labs studies:   I have personally reviewed pertinent labs.  Results from last 7 days   Lab Units 24  0454 24  0559 24  0446   POTASSIUM mmol/L 4.3 4.6 5.0   CHLORIDE mmol/L 112*  110* 109*   CO2 mmol/L 23 21 18*   BUN mg/dL 29* 40* 47*   CREATININE mg/dL 0.99 1.12 1.35*   EGFR ml/min/1.73sq m 63 54 43   CALCIUM mg/dL 9.5 9.8 10.1     Results from last 7 days   Lab Units 02/06/24  0454 02/04/24  0559 02/03/24  0446   WBC Thousand/uL 5.07 5.92 8.31   HEMOGLOBIN g/dL 11.5 11.9 13.6   PLATELETS Thousands/uL 162 195 240     Results from last 7 days   Lab Units 02/02/24  0941 02/02/24  0940 02/02/24  0938   GRAM STAIN RESULT  1+ Polys  No bacteria seen No Polys or Bacteria seen Rare Polys  No organisms seen       Imaging Studies:   I have personally reviewed pertinent imaging study reports and images in PACS.    EKG, Pathology, and Other Studies:   I have personally reviewed pertinent reports.

## 2024-02-06 NOTE — PLAN OF CARE
Problem: Prexisting or High Potential for Compromised Skin Integrity  Goal: Skin integrity is maintained or improved  Description: INTERVENTIONS:  - Identify patients at risk for skin breakdown  - Assess and monitor skin integrity  - Assess and monitor nutrition and hydration status  - Monitor labs   - Assess for incontinence   - Turn and reposition patient  - Assist with mobility/ambulation  - Relieve pressure over bony prominences  - Avoid friction and shearing  - Provide appropriate hygiene as needed including keeping skin clean and dry  - Evaluate need for skin moisturizer/barrier cream  - Collaborate with interdisciplinary team   - Patient/family teaching  - Consider wound care consult   Outcome: Progressing     Problem: Nutrition/Hydration-ADULT  Goal: Nutrient/Hydration intake appropriate for improving, restoring or maintaining nutritional needs  Description: Monitor and assess patient's nutrition/hydration status for malnutrition. Collaborate with interdisciplinary team and initiate plan and interventions as ordered.  Monitor patient's weight and dietary intake as ordered or per policy. Utilize nutrition screening tool and intervene as necessary. Determine patient's food preferences and provide high-protein, high-caloric foods as appropriate.     INTERVENTIONS:  - Monitor oral intake, urinary output, labs, and treatment plans  - Assess nutrition and hydration status and recommend course of action  - Evaluate amount of meals eaten  - Assist patient with eating if necessary   - Allow adequate time for meals  - Recommend/ encourage appropriate diets, oral nutritional supplements, and vitamin/mineral supplements  - Order, calculate, and assess calorie counts as needed  - Recommend, monitor, and adjust tube feedings and TPN/PPN based on assessed needs  - Assess need for intravenous fluids  - Provide specific nutrition/hydration education as appropriate  - Include patient/family/caregiver in decisions related to  nutrition  Outcome: Progressing     Problem: PAIN - ADULT  Goal: Verbalizes/displays adequate comfort level or baseline comfort level  Description: Interventions:  - Encourage patient to monitor pain and request assistance  - Assess pain using appropriate pain scale  - Administer analgesics based on type and severity of pain and evaluate response  - Implement non-pharmacological measures as appropriate and evaluate response  - Consider cultural and social influences on pain and pain management  - Notify physician/advanced practitioner if interventions unsuccessful or patient reports new pain  Outcome: Progressing     Problem: SAFETY ADULT  Goal: Patient will remain free of falls  Description: INTERVENTIONS:  - Educate patient/family on patient safety including physical limitations  - Instruct patient to call for assistance with activity   - Consult OT/PT to assist with strengthening/mobility   - Keep Call bell within reach  - Keep bed low and locked with side rails adjusted as appropriate  - Keep care items and personal belongings within reach  - Initiate and maintain comfort rounds  - Make Fall Risk Sign visible to staff  - Offer Toileting every 2 Hours, in advance of need  - Initiate/Maintain bed alarm  - Obtain necessary fall risk management equipment: non skid footwear  - Apply yellow socks and bracelet for high fall risk patients  - Consider moving patient to room near nurses station  Outcome: Progressing     Problem: Potential for Falls  Goal: Patient will remain free of falls  Description: INTERVENTIONS:  - Educate patient/family on patient safety including physical limitations  - Instruct patient to call for assistance with activity   - Consult OT/PT to assist with strengthening/mobility   - Keep Call bell within reach  - Keep bed low and locked with side rails adjusted as appropriate  - Keep care items and personal belongings within reach  - Initiate and maintain comfort rounds  - Make Fall Risk Sign  visible to staff  - Offer Toileting every 2 Hours, in advance of need  - Initiate/Maintain bed alarm  - Obtain necessary fall risk management equipment: non skid footwear  - Apply yellow socks and bracelet for high fall risk patients  - Consider moving patient to room near nurses station  Outcome: Progressing

## 2024-02-06 NOTE — UTILIZATION REVIEW
Continued Stay Review    Date: 2-6-24                           Current Patient Class: inpatient  Current Level of Care: step down    HPI:59 y.o. female initially admitted on 1-19-24      Assessment/Plan:     Breath sounds diminished.  Trial 10 L O2 midflow nc not tolerated.  Increased oxygen to 40 L high flow nc.  Continue high dose iv solumedrol 1 G daily. ID monitoring off antibiotic. Encephalopathy resolved.    Vital Signs:     Date/Time Temp Pulse Resp BP MAP (mmHg) SpO2 O2 Flow Rate (L/min) Nasal Cannula O2 Flow Rate (L/min) O2 Device   02/06/24 1225 97.4 °F (36.3 °C)   Abnormal  96 -- 118/73 88 96 % -- -- --   02/06/24 0930 -- 90 -- -- -- 100 % -- 40 L/min High flow nasal cannula   02/06/24 0753 -- -- -- -- -- -- 10 L/min -- Mid flow nasal cannula   02/06/24 0745 97.6 °F (36.4 °C) 104 -- 103/71 78 93 % -- -- --   02/06/24 0321 97.5 °F (36.4 °C) 66 19 117/69 90 98 % -- -- --   02/06/24 0310 -- -- -- -- -- 100 % -- -- --         Pertinent Labs/Diagnostic Results:       Results from last 7 days   Lab Units 02/06/24  0454 02/04/24 0559 02/03/24 0446 02/02/24  0706 02/01/24  1447   WBC Thousand/uL 5.07 5.92 8.31 6.75 10.98*   HEMOGLOBIN g/dL 11.5 11.9 13.6 13.3 13.7   HEMATOCRIT % 36.3 36.6 41.9 41.9 43.1   PLATELETS Thousands/uL 162 195 240 247 240   BANDS PCT % 9*  --   --   --   --          Results from last 7 days   Lab Units 02/06/24  0454 02/04/24 0559 02/03/24  0446 02/02/24  0706 02/01/24  1447   SODIUM mmol/L 143 140 137 140 136   POTASSIUM mmol/L 4.3 4.6 5.0 4.9 5.0   CHLORIDE mmol/L 112* 110* 109* 109* 105   CO2 mmol/L 23 21 18* 20* 21   ANION GAP mmol/L 8 9 10 11 10   BUN mg/dL 29* 40* 47* 44* 53*   CREATININE mg/dL 0.99 1.12 1.35* 1.26 1.45*   EGFR ml/min/1.73sq m 63 54 43 47 40   CALCIUM mg/dL 9.5 9.8 10.1 10.0 10.3*   MAGNESIUM mg/dL 1.9 1.9 2.0 2.0 2.1   PHOSPHORUS mg/dL 3.7 3.2 3.5 4.0 4.5         Results from last 7 days   Lab Units 02/06/24  1224 02/06/24  0744 02/05/24  2145  02/05/24  1652 02/05/24  1142 02/05/24  0846 02/05/24  0805 02/04/24  2110 02/04/24  1621 02/04/24  1121 02/04/24  0740 02/03/24  2100   POC GLUCOSE mg/dl 153* 141* 266* 168* 114 85 65 297* 168* 100 71 173*     Results from last 7 days   Lab Units 02/06/24  0454 02/04/24  0559 02/03/24  0446 02/02/24  0706 02/01/24  1447   GLUCOSE RANDOM mg/dL 158* 72 93 81 137       Results from last 7 days   Lab Units 01/31/24  1259   CRP mg/L 5.6*             Results from last 7 days   Lab Units 02/01/24  1859   CLARITY UA  Clear   COLOR UA  Light Yellow   SPEC GRAV UA  1.012   PH UA  6.5   GLUCOSE UA mg/dl Negative   KETONES UA mg/dl Negative   BLOOD UA  Negative   PROTEIN UA mg/dl Negative   NITRITE UA  Negative   BILIRUBIN UA  Negative   UROBILINOGEN UA (BE) mg/dl <2.0   LEUKOCYTES UA  Negative   WBC UA /hpf 2-4*   RBC UA /hpf None Seen   BACTERIA UA /hpf None Seen   EPITHELIAL CELLS WET PREP /hpf Occasional           Results from last 7 days   Lab Units 02/02/24  0941 02/02/24  0940 02/02/24  0938   GRAM STAIN RESULT  1+ Polys  No bacteria seen No Polys or Bacteria seen Rare Polys  No organisms seen       Scheduled Medications:    chlorhexidine, 15 mL, Mouth/Throat, Q12H SARTHAK  enoxaparin, 40 mg, Subcutaneous, Daily  insulin lispro, 1-5 Units, Subcutaneous, 4x Daily (AC & HS)  lamoTRIgine, 150 mg, Oral, BID  melatonin, 6 mg, Oral, HS  methylPREDNISolone sodium succinate, 1,000 mg, Intravenous, Daily   Followed by  [START ON 2/8/2024] predniSONE, 80 mg, Oral, Daily  nystatin, 500,000 Units, Swish & Spit, 4x Daily  polyethylene glycol, 17 g, Oral, Daily  QUEtiapine, 12.5 mg, Oral, HS  topiramate, 100 mg, Oral, BID  venlafaxine, 25 mg, Oral, TID With Meals      Continuous IV Infusions:     PRN Meds:  acetaminophen, 650 mg, Oral, Q6H PRN  bisacodyl, 10 mg, Rectal, Daily PRN  calcium carbonate, 500 mg, Oral, BID PRN  metoprolol, 2.5 mg, Intravenous, Q6H PRN  ondansetron, 4 mg, Intravenous, Q6H PRN  senna-docusate sodium, 2 tablet,  Oral, BID PRN  sodium chloride, 1 spray, Each Nare, Q1H PRN        Discharge Plan: to be determined     Network Utilization Review Department  ATTENTION: Please call with any questions or concerns to [phone] and carefully listen to the prompts so that you are directed to the right person. All voicemails are confidential.   For Discharge needs, contact Care Management DC Support Team at [phone] opt. 2  Send all requests for admission clinical reviews, approved or denied determinations and any other requests to dedicated fax number below belonging to the [facility] where the patient is receiving treatment. List of dedicated fax numbers for the Facilities:  FACILITY NAME UR FAX NUMBER   ADMISSION DENIALS (Administrative/Medical Necessity) [phone]   DISCHARGE SUPPORT TEAM (NETWORK) [phone]   PARENT CHILD HEALTH (Maternity/NICU/Pediatrics) [phone]   [facility] [phone]   [facility] [phone]   [facility] [phone]   [facility] [phone]   [facility] [phone]   [facility] [phone]   [facility] [phone]   [facility] [phone]   [facility] [phone]   [facility] [phone]   [facility] [phone]   [facility] [phone]

## 2024-02-06 NOTE — RESTORATIVE TECHNICIAN NOTE
Restorative Technician Note      Patient Name: Carla Hunt     Restorative Tech Visit Date: 02/06/24  Note Type: Mobility  Patient Position Upon Consult: Supine  Activity Performed: Ambulated  Assistive Device: Other (Comment) (HHA x 1)  Education Provided: Yes  Patient Position at End of Consult: Bedside chair; All needs within reach      Roly Mchugh Tech

## 2024-02-06 NOTE — RESPIRATORY THERAPY NOTE
Resp Care   02/06/24 0971   Respiratory Assessment   Assessment Type Assess only   General Appearance Awake;Alert   Respiratory Pattern Tachypneic   Chest Assessment Chest expansion symmetrical   Bilateral Breath Sounds Diminished   Cough None   Resp Comments pt found on HFNC, 45%, 45 flow, pt titrated to MFNC, spo2 is 96%, bs are diminished, will continue to monitor.   Oxygen Therapy/Pulse Ox   O2 Device Mid flow nasal cannula   O2 Therapy Oxygen humidified   O2 Flow Rate (L/min) 10 L/min   SpO2 Activity At Rest   $ Pulse Oximetry Spot Check Charge Completed

## 2024-02-06 NOTE — PROGRESS NOTES
Critical Care Interval Transfer Note:    ICU Course summary:  57 yo female, PMH epilepsy, anxiety, CKD 3, B cell lymphoma on chemo. She initially presented to [facility] on 1/8 with AMS, weakness, and poor appetite. She had previously been treated for UTI outpatient and completed course of ciprofloxacin. She was found to be COVID positive on admission. Presenting symptoms were concerning for stroke, CTH revealed possible SAH but no intervention was required per neurosurgery. There was also possible lacunar infarct. Routine EEG was negative. Despite treatment condition continued to worsen. LP was performed and she was started on empiric abx for meningitis. She was transferred to [facility] for vEEG on 1/10. LP studies appeared benign. Video EEG did not reveal any seizure activity. She was treated on severe COVID pathway initially requiring HFNC and given abx with CTX. She was transferred out of the ICU on 1/16 and at that time was gradually weaning down from MFNC. Pulmonology was consulted on 1/26 for possible bronchoscopy due to newly increasing O2 requirements since 1/24. CT chest revealed fibrosis and GGO likely from COVID along with RLL pna. ID has been following and did not recommend repeat course of abx as case appears consistent with post-viral rather than bacterial pna. Sputum cultures were unremarkable. LDH was checked given pt's immunocompromised status on chemo and was mildly elevated at 326. Steroids were reinitiated to complete taper as they had previously been abruptly d/c's upon completion of COVID pathway. 1/28 pt continued to have escalating O2 requirements from 10L to HFNC at 100% FiO2 - therefore decision was made to upgrade her to critical care. She completed 5 day course of abx with minocycline and bactrim due to concern for immunosuppression and PCP. Transferred to floors 1/30 but continued on high HFNC requirements so back to SD1 on 2/1. Underwent bronchoscopy on 2/2 without significant findings and  cultures all negative. As she was not improving on 60mg prednisone she was started on pulse dose steroids with 1g solumedrol daily x3 days (started 2/5), to be followed by 80mg prednisone and slow taper. She will continue to be followed by pulmonary consult service.     Please refer to progress note from earlier today for full details.     Barriers to discharge:   Wean down O2 settings      Consults: IP CONSULT TO CASE MANAGEMENT  IP CONSULT TO PHARMACY  IP CONSULT TO INFECTIOUS DISEASES  IP CONSULT TO PICC TEAM  IP CONSULT TO PULMONOLOGY  IP CONSULT TO INFECTIOUS DISEASES  IP CONSULT TO CASE MANAGEMENT    Recommended to review admission imaging for incidental findings and document in discharge navigator: Chart reviewed, no known incidental findings noted at this time.      Discharge Plan: Anticipate discharge in >72 hrs to discharge location to be determined pending rehab evaluations.            Patient seen and evaluated by Critical Care today and deemed to be appropriate for transfer to Stepdown Level 2. Spoke to Dr. Calle from [facility] to accept transfer. Critical care can be contacted via [facility] [employer] with any questions or concerns.

## 2024-02-07 LAB
GLUCOSE SERPL-MCNC: 141 MG/DL (ref 65–140)
GLUCOSE SERPL-MCNC: 155 MG/DL (ref 65–140)
GLUCOSE SERPL-MCNC: 215 MG/DL (ref 65–140)
GLUCOSE SERPL-MCNC: 236 MG/DL (ref 65–140)

## 2024-02-07 PROCEDURE — 99232 SBSQ HOSP IP/OBS MODERATE 35: CPT | Performed by: INTERNAL MEDICINE

## 2024-02-07 PROCEDURE — 97530 THERAPEUTIC ACTIVITIES: CPT

## 2024-02-07 PROCEDURE — 99232 SBSQ HOSP IP/OBS MODERATE 35: CPT | Performed by: STUDENT IN AN ORGANIZED HEALTH CARE EDUCATION/TRAINING PROGRAM

## 2024-02-07 PROCEDURE — 94760 N-INVAS EAR/PLS OXIMETRY 1: CPT

## 2024-02-07 PROCEDURE — 97116 GAIT TRAINING THERAPY: CPT

## 2024-02-07 PROCEDURE — 82948 REAGENT STRIP/BLOOD GLUCOSE: CPT

## 2024-02-07 PROCEDURE — 99233 SBSQ HOSP IP/OBS HIGH 50: CPT | Performed by: INTERNAL MEDICINE

## 2024-02-07 PROCEDURE — 97535 SELF CARE MNGMENT TRAINING: CPT

## 2024-02-07 RX ORDER — FUROSEMIDE 40 MG/1
40 TABLET ORAL ONCE
Status: COMPLETED | OUTPATIENT
Start: 2024-02-07 | End: 2024-02-07

## 2024-02-07 RX ADMIN — FUROSEMIDE 40 MG: 40 TABLET ORAL at 11:10

## 2024-02-07 RX ADMIN — ENOXAPARIN SODIUM 40 MG: 40 INJECTION SUBCUTANEOUS at 08:36

## 2024-02-07 RX ADMIN — MELATONIN 6 MG: at 20:41

## 2024-02-07 RX ADMIN — TOPIRAMATE 100 MG: 100 TABLET, FILM COATED ORAL at 08:36

## 2024-02-07 RX ADMIN — SODIUM CHLORIDE 1000 MG: 0.9 INJECTION, SOLUTION INTRAVENOUS at 08:36

## 2024-02-07 RX ADMIN — TOPIRAMATE 100 MG: 100 TABLET, FILM COATED ORAL at 17:29

## 2024-02-07 RX ADMIN — VENLAFAXINE 25 MG: 25 TABLET ORAL at 17:30

## 2024-02-07 RX ADMIN — LAMOTRIGINE 150 MG: 100 TABLET ORAL at 08:36

## 2024-02-07 RX ADMIN — QUETIAPINE FUMARATE 12.5 MG: 25 TABLET ORAL at 21:33

## 2024-02-07 RX ADMIN — NYSTATIN 500000 UNITS: 100000 SUSPENSION ORAL at 11:10

## 2024-02-07 RX ADMIN — LAMOTRIGINE 150 MG: 100 TABLET ORAL at 20:41

## 2024-02-07 RX ADMIN — INSULIN LISPRO 2 UNITS: 100 INJECTION, SOLUTION INTRAVENOUS; SUBCUTANEOUS at 17:31

## 2024-02-07 RX ADMIN — NYSTATIN 500000 UNITS: 100000 SUSPENSION ORAL at 07:00

## 2024-02-07 RX ADMIN — INSULIN LISPRO 2 UNITS: 100 INJECTION, SOLUTION INTRAVENOUS; SUBCUTANEOUS at 21:37

## 2024-02-07 RX ADMIN — VENLAFAXINE 25 MG: 25 TABLET ORAL at 11:10

## 2024-02-07 RX ADMIN — CHLORHEXIDINE GLUCONATE 15 ML: 1.2 SOLUTION ORAL at 08:36

## 2024-02-07 RX ADMIN — VENLAFAXINE 25 MG: 25 TABLET ORAL at 08:37

## 2024-02-07 NOTE — PLAN OF CARE
Problem: Prexisting or High Potential for Compromised Skin Integrity  Goal: Skin integrity is maintained or improved  Description: INTERVENTIONS:  - Identify patients at risk for skin breakdown  - Assess and monitor skin integrity  - Assess and monitor nutrition and hydration status  - Monitor labs   - Assess for incontinence   - Turn and reposition patient  - Assist with mobility/ambulation  - Relieve pressure over bony prominences  - Avoid friction and shearing  - Provide appropriate hygiene as needed including keeping skin clean and dry  - Evaluate need for skin moisturizer/barrier cream  - Collaborate with interdisciplinary team   - Patient/family teaching  - Consider wound care consult   Outcome: Progressing     Problem: INFECTION - ADULT  Goal: Absence or prevention of progression during hospitalization  Description: INTERVENTIONS:  - Assess and monitor for signs and symptoms of infection  - Monitor lab/diagnostic results  - Monitor all insertion sites, i.e. indwelling lines, tubes, and drains  - Monitor endotracheal if appropriate and nasal secretions for changes in amount and color  - [place] appropriate cooling/warming therapies per order  - Administer medications as ordered  - Instruct and encourage patient and family to use good hand hygiene technique  - Identify and instruct in appropriate isolation precautions for identified infection/condition  Outcome: Progressing     Problem: SAFETY ADULT  Goal: Patient will remain free of falls  Description: INTERVENTIONS:  - Educate patient/family on patient safety including physical limitations  - Instruct patient to call for assistance with activity   - Consult OT/PT to assist with strengthening/mobility   - Keep Call bell within reach  - Keep bed low and locked with side rails adjusted as appropriate  - Keep care items and personal belongings within reach  - Initiate and maintain comfort rounds  - Make Fall Risk Sign visible to staff  - Offer Toileting every  2 Hours, in advance of need  - Initiate/Maintain bed alarm  - Obtain necessary fall risk management equipment: non skid footwear  - Apply yellow socks and bracelet for high fall risk patients  - Consider moving patient to room near nurses station  Outcome: Progressing     Problem: RESPIRATORY - ADULT  Goal: Achieves optimal ventilation and oxygenation  Description: INTERVENTIONS:  - Assess for changes in respiratory status  - Assess for changes in mentation and behavior  - Position to facilitate oxygenation and minimize respiratory effort  - Oxygen administered by appropriate delivery if ordered  - Initiate smoking cessation education as indicated  - Encourage broncho-pulmonary hygiene including cough, deep breathe, Incentive Spirometry  - Assess the need for suctioning and aspirate as needed  - Assess and instruct to report SOB or any respiratory difficulty  - Respiratory Therapy support as indicated  Outcome: Progressing     Problem: SKIN/TISSUE INTEGRITY - ADULT  Goal: Skin Integrity remains intact(Skin Breakdown Prevention)  Description: Assess:  -Perform Flavio assessment every shift   -Clean and moisturize skin every day   -Inspect skin when repositioning, toileting, and assisting with ADLS  -Assess under medical devices such as ronny  every hour   -Assess extremities for adequate circulation and sensation     Bed Management:  -Have minimal linens on bed & keep smooth, unwrinkled  -Change linens as needed when moist or perspiring  -Avoid sitting or lying in one position for more than 2 hours while in bed  -Keep HOB at 45 degrees     Toileting:  -Offer bedside commode  -Assess for incontinence every hour  -Use incontinent care products after each incontinent episode such as moisture barrier cream     Activity:  -Mobilize patient 3 times a day  -Encourage activity and walks on unit  -Encourage or provide ROM exercises   -Turn and reposition patient every 2 Hours  -Use appropriate equipment to lift or move  patient in bed  -Instruct/ Assist with weight shifting every hour when out of bed in chair  -Consider limitation of chair time 2 hour intervals    Skin Care:  -Avoid use of baby powder, tape, friction and shearing, hot water or constrictive clothing  -Relieve pressure over bony prominences using allevyn   -Do not massage red bony areas    Next Steps:  -Teach patient strategies to minimize risks such as weight shifting    -Consider consults to  interdisciplinary teams such as PT  Outcome: Progressing     Problem: Potential for Falls  Goal: Patient will remain free of falls  Description: INTERVENTIONS:  - Educate patient/family on patient safety including physical limitations  - Instruct patient to call for assistance with activity   - Consult OT/PT to assist with strengthening/mobility   - Keep Call bell within reach  - Keep bed low and locked with side rails adjusted as appropriate  - Keep care items and personal belongings within reach  - Initiate and maintain comfort rounds  - Make Fall Risk Sign visible to staff  - Offer Toileting every 2 Hours, in advance of need  - Initiate/Maintain bed alarm  - Obtain necessary fall risk management equipment: non skid footwear  - Apply yellow socks and bracelet for high fall risk patients  - Consider moving patient to room near nurses station  Outcome: Progressing

## 2024-02-07 NOTE — PROGRESS NOTES
PULMONOLOGY FOLLOW UP NOTE     Name: Carla Hunt   Age & Sex: 59 y.o. female   MRN: [id number]  Unit/Bed#: [facility] 722-01   Encounter: [id number]    Assessment:   Acute hypoxic respiratory failure   COVID with possible COVID pneumonitis   Stenotrophomonas pneumonia   Stage IV B cell lymphoma on Rituxan   Epilepsy   CKD stage 3     Plan:  Acute hypoxic respiratory failure   Currently requiring 6-10L MFNC, much improved from previous.   - Will need to further wean supplemental O2 as tolerated. Patient will most likely require O2 supplementation at baseline due to extensive fibrotic disease of the lungs     COVID with possible pneumonia vs pneumonitis   CT Chest shows extensive fibrosis and ground glass opacities, bronchiectasis consistent with COVID and possible underlying infectious process or fibrosis in the setting of rituxan. Has completed COVID pathway, no longer on remdesevir.   - Continue pulse dose steriods x3 days (last dose 2/7)  - Then follow with prednisone 80mg daily. Will taper by 10mg every week if patient continues to improve   - Will initiate furosemide 40mg once   - Strict I/Os  - Will need pulmonology follow up outpatient with repeat imaging     Stenotrophomonas pneumonia  Bronchial culture on 2/1 showed stenotrophomonas growing.  Now status post minocycline and Bactrim x 5 days.  Continues to have productive cough with green sputum.  -Will hold off on additional antibiotics at this time however if patient worsens may need additional antibiotics to cover stenotrophomonas    Stage IV B cell lymphoma on Rituxan   Hx of B cell lymphoma with last dose of rituxan in December. Rituxan is known to cause lung fibrosis. CT Chest shows evidence of fibrosis that may have been present before admission. Unclear etiology of lung disease at this time, but appears multifactorial with drug induced fibrosis, covid induced inflammation and possible bacterial infection. Bronchoscopy showed no evidence of  malignancy on cytology at this time.   - Will continue to hold off rituxan given inpatient admission at this time   - Will need to follow up with oncology outpatient for possible discussion of continuing rituxan or not.     History of Present Illness   HPI:  Carla Hunt is a 59 y.o. 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  Patient initially presented to [facility] and was found to be COVID-positive on admission, presenting symptoms were concerning for stroke and CT head revealed a possible subarachnoid hemorrhage with no intervention required by neurosurgery.  Patient was transferred to [place] for video EEG monitoring due to concern for seizures for persistent encephalopathy.  Video EEG was negative, LP was done due to concern for meningitis but was benign.  Patient was started treatment on the severe COVID pathway on remdesivir and Decadron initially requiring high flow.  Patient has been in and out of the ICU due to escalating oxygen requirements most recently was in the ICU on 2/1 due to requiring high flow.  Bronchoscopy was done and showed evidence of stenotrophomonas on culture.  Patient was treated on Bactrim and minocycline for 5 days.  Patient continued to require high flow and was initiated on pulse dose steroids 1 g Solu-Medrol for 3 days followed by 80 mg of prednisone.  Patient was then transferred to the floor and is currently doing well.  Patient denies any shortness of breath at this time and was able to walk around the hallway with no issues.  She does continue to endorse a productive cough with green like sputum.  Denies fever chills nausea vomiting chest pain.    Review of systems:  12 point review of systems was completed and was otherwise negative except as listed in HPI.      Historical Information   Past Medical History:   Diagnosis  Date    Hx of hypercalcemia     Lymphoma (HCC) 2021    Parathyroid disease (HCC)     Seizures (HCC)     Situational depression     24 yr old son  from drug overdose     Past Surgical History:   Procedure Laterality Date    CRANIOTOMY FOR TEMPORAL LOBECTOMY Left 2007     Family History   Problem Relation Age of Onset    Diabetes Mother     Cancer Father        Social History    Social History:   Social History     Socioeconomic History    Marital status: /Civil Union     Spouse name: Not on file    Number of children: Not on file    Years of education: Not on file    Highest education level: Not on file   Occupational History    Not on file   Tobacco Use    Smoking status: Never    Smokeless tobacco: Never   Vaping Use    Vaping status: Never Used   Substance and Sexual Activity    Alcohol use: No    Drug use: Never    Sexual activity: Not on file   Other Topics Concern    Not on file   Social History Narrative    Not on file     Social Determinants of Health     Financial Resource Strain: Not on file   Food Insecurity: No Food Insecurity (2024)    Hunger Vital Sign     Worried About Running Out of Food in the Last Year: Never true     Ran Out of Food in the Last Year: Never true   Transportation Needs: No Transportation Needs (2024)    PRAPARE - Transportation     Lack of Transportation (Medical): No     Lack of Transportation (Non-Medical): No   Physical Activity: Not on file   Stress: Not on file   Social Connections: Not on file   Intimate Partner Violence: Not on file   Housing Stability: Low Risk  (2024)    Housing Stability Vital Sign     Unable to Pay for Housing in the Last Year: No     Number of Places Lived in the Last Year: 1     Unstable Housing in the Last Year: No       Occupational History: n/a    Meds/Allergies   Current Facility-Administered Medications   Medication Dose Route Frequency    acetaminophen (TYLENOL) tablet 650 mg  650 mg Oral Q6H PRN    bisacodyl (DULCOLAX)  "rectal suppository 10 mg  10 mg Rectal Daily PRN    calcium carbonate (TUMS) chewable tablet 500 mg  500 mg Oral BID PRN    chlorhexidine (PERIDEX) 0.12 % oral rinse 15 mL  15 mL Mouth/Throat Q12H SARTHAK    enoxaparin (LOVENOX) subcutaneous injection 40 mg  40 mg Subcutaneous Daily    insulin lispro (HumaLOG) 100 units/mL subcutaneous injection 1-5 Units  1-5 Units Subcutaneous 4x Daily (AC & HS)    lamoTRIgine (LaMICtal) tablet 150 mg  150 mg Oral BID    melatonin tablet 6 mg  6 mg Oral HS    metoprolol (LOPRESSOR) injection 2.5 mg  2.5 mg Intravenous Q6H PRN    nystatin (MYCOSTATIN) oral suspension 500,000 Units  500,000 Units Swish & Spit 4x Daily    ondansetron (ZOFRAN) injection 4 mg  4 mg Intravenous Q6H PRN    polyethylene glycol (MIRALAX) packet 17 g  17 g Oral Daily    [START ON 2/8/2024] predniSONE tablet 80 mg  80 mg Oral Daily    QUEtiapine (SEROquel) tablet 12.5 mg  12.5 mg Oral HS    senna-docusate sodium (SENOKOT S) 8.6-50 mg per tablet 2 tablet  2 tablet Oral BID PRN    sodium chloride (OCEAN) 0.65 % nasal spray 1 spray  1 spray Each Nare Q1H PRN    topiramate (TOPAMAX) tablet 100 mg  100 mg Oral BID    venlafaxine (EFFEXOR) tablet 25 mg  25 mg Oral TID With Meals     Medications Prior to Admission   Medication    busPIRone (BUSPAR) 5 mg tablet    escitalopram (LEXAPRO) 10 mg tablet    ibuprofen (MOTRIN) 600 mg tablet    lamoTRIgine (LaMICtal) 200 MG tablet    lamoTRIgine (LaMICtal) 200 MG tablet    medroxyPROGESTERone acetate (DEPO-PROVERA SYRINGE) 150 mg/mL injection    ondansetron (ZOFRAN-ODT) 4 mg disintegrating tablet    topiramate (TOPAMAX) 100 mg tablet    topiramate (TOPAMAX) 100 mg tablet    venlafaxine (EFFEXOR-XR) 75 mg 24 hr capsule     No Known Allergies    Objective    Vitals: Blood pressure 120/67, pulse 80, temperature 98.4 °F (36.9 °C), resp. rate 20, height 5' 8\" (1.727 m), weight 73 kg (160 lb 15 oz), SpO2 96%., Body mass index is 24.47 kg/m².      Intake/Output Summary (Last 24 " hours) at 2/7/2024 1316  Last data filed at 2/7/2024 1100  Gross per 24 hour   Intake 520 ml   Output 500 ml   Net 20 ml       Physical Exam  Vitals and nursing note reviewed.   Constitutional:       General: She is not in acute distress.     Appearance: She is well-developed.   HENT:      Head: Normocephalic and atraumatic.   Eyes:      Conjunctiva/sclera: Conjunctivae normal.   Cardiovascular:      Rate and Rhythm: Normal rate and regular rhythm.      Heart sounds: No murmur heard.  Pulmonary:      Effort: Pulmonary effort is normal. No respiratory distress.      Comments: Bilateral rales present in the bases   Abdominal:      Palpations: Abdomen is soft.      Tenderness: There is no abdominal tenderness.   Musculoskeletal:         General: No swelling.      Cervical back: Neck supple.      Comments: Trade edema present bilaterally    Skin:     General: Skin is warm and dry.      Capillary Refill: Capillary refill takes less than 2 seconds.   Neurological:      Mental Status: She is alert.   Psychiatric:         Mood and Affect: Mood normal.         Labs: I have personally reviewed pertinent lab results.  Laboratory and Diagnostics  Results from last 7 days   Lab Units 02/06/24 0454 02/04/24 0559 02/03/24  0446 02/02/24  0706 02/01/24  1447   WBC Thousand/uL 5.07 5.92 8.31 6.75 10.98*   HEMOGLOBIN g/dL 11.5 11.9 13.6 13.3 13.7   HEMATOCRIT % 36.3 36.6 41.9 41.9 43.1   PLATELETS Thousands/uL 162 195 240 247 240   BANDS PCT % 9*  --   --   --   --    MONO PCT % 2*  --   --   --  2*   EOS PCT % 0  --   --   --  0     Results from last 7 days   Lab Units 02/06/24  0454 02/04/24  0559 02/03/24  0446 02/02/24  0706 02/01/24  1447   SODIUM mmol/L 143 140 137 140 136   POTASSIUM mmol/L 4.3 4.6 5.0 4.9 5.0   CHLORIDE mmol/L 112* 110* 109* 109* 105   CO2 mmol/L 23 21 18* 20* 21   ANION GAP mmol/L 8 9 10 11 10   BUN mg/dL 29* 40* 47* 44* 53*   CREATININE mg/dL 0.99 1.12 1.35* 1.26 1.45*   CALCIUM mg/dL 9.5 9.8 10.1 10.0  "10.3*   GLUCOSE RANDOM mg/dL 158* 72 93 81 137     Results from last 7 days   Lab Units 02/06/24  0454 02/04/24  0559 02/03/24  0446 02/02/24  0706 02/01/24  1447   MAGNESIUM mg/dL 1.9 1.9 2.0 2.0 2.1   PHOSPHORUS mg/dL 3.7 3.2 3.5 4.0 4.5                                         ABG:       Micro:  Results from last 7 days   Lab Units 02/02/24  0941 02/02/24  0940 02/02/24  0938   GRAM STAIN RESULT  1+ Polys  No bacteria seen No Polys or Bacteria seen Rare Polys  No organisms seen       Imaging and other studies: I have personally reviewed pertinent reports.      Pulmonary function testing:     EKG, Pathology, and Other Studies: I have personally reviewed pertinent reports.             Code Status: Level 1 - Full Code    VTE Pharmacologic Prophylaxis: Enoxaparin (Lovenox)  VTE Mechanical Prophylaxis: sequential compression device    Disclaimer: Portions of the record may have been created with voice recognition software. Occasional wrong word or \"sound a like\" substitutions may have occurred due to the inherent limitations of voice recognition software. Careful consideration should be taken to recognize, using context, where substitutions have occurred.    Ryanne Lerma MD        "

## 2024-02-07 NOTE — ASSESSMENT & PLAN NOTE
Lab Results   Component Value Date    EGFR 63 02/06/2024    EGFR 54 02/04/2024    EGFR 43 02/03/2024    CREATININE 0.99 02/06/2024    CREATININE 1.12 02/04/2024    CREATININE 1.35 (H) 02/03/2024   Cr at baseline   Avoid nephrotoxins

## 2024-02-07 NOTE — PLAN OF CARE
Problem: PHYSICAL THERAPY ADULT  Goal: Performs mobility at highest level of function for planned discharge setting.  See evaluation for individualized goals.  Description:    Equipment Recommended:  (none)       See flowsheet documentation for full assessment, interventions and recommendations.  Outcome: Progressing  Note: Prognosis: Good  Problem List: Decreased mobility, Decreased endurance  Assessment: The patient is demonstrating notable improvement in her mobility as she was able to attain community distances with ambulation today. She had no loss of balance, and she noted no symptoms even though she does desaturation. She initially was trialed on 8L O2 where she desaturated to 76% after fifty-five feet of gait. Increased her to the next setting on the portable tank of 15L, and she recovered within three minutes. She then ambulated down the allen with SPO2 gradually dropping to 78% after one-hundred-andfifty-five feet. She recovred within 120 seconds this time. With the third ambulatory trial she was able to maintain SPO2 above 82%, and she recovered within 90 seconds. She was in the chair post session with all needs within reach. Of note assisted restorative ambulate the patient later in the afternoon and the patient had improved oxygen saturation and much faster recovery. Will continue to follow.  Barriers to Discharge: None     Rehab Resource Intensity Level, PT: III (Minimum Resource Intensity)    See flowsheet documentation for full assessment.

## 2024-02-07 NOTE — RESTORATIVE TECHNICIAN NOTE
Restorative Technician Note      Patient Name: Carla Hunt     Note Type: Mobility  Patient Position Upon Consult: Bedside chair  Activity Performed: Ambulated; Dangled; Stood  Assistive Device: Roller walker; Other (Comment) (Assist x1 with chair follow and NC O2 on 10L in room at rest and 15L for ambulation.)  Education Provided: Yes  Patient Position at End of Consult: Supine; All needs within reach; Bed/Chair alarm activated    Héctor LAYNE, Restorative Technician,

## 2024-02-07 NOTE — PLAN OF CARE
Problem: OCCUPATIONAL THERAPY ADULT  Goal: Performs self-care activities at highest level of function for planned discharge setting.  See evaluation for individualized goals.  Description: Treatment Interventions: ADL retraining, Functional transfer training, UE strengthening/ROM, Endurance training, Patient/family training, Equipment evaluation/education, Compensatory technique education, Continued evaluation, Energy conservation, Activityengagement          See flowsheet documentation for full assessment, interventions and recommendations.   Outcome: Progressing  Note: Limitation: Decreased ADL status, Decreased Safe judgement during ADL, Decreased cognition, Decreased endurance, Decreased self-care trans, Decreased high-level ADLs  Prognosis: Good  Assessment: Patient participated in Skilled OT session this date with interventions consisting of ADL re-training, functional transfers/mobility, energy conservation. Patient agreeable to OT treatment session, upon arrival patient was found seated OOB to Recliner.  In comparison to previous session, patient with improvements in activity tolerance, standing balance. Pt reports feeling better today and less SOB. Pt participated in functional mobility to sink-side with 9 L NC; desaturated to 75%, recovered to 86% with 2 minutes seated rest. Pt requiring MIN A for LB ADLs d/t desaturation and decreased balance. Patient continues to be functioning below baseline level, occupational performance remains limited secondary to factors listed above and increased risk for falls and injury.   From OT standpoint, recommendation at time of d/c would be LEVEL III needs. Patient to benefit from continued Occupational Therapy treatment while in the hospital to address deficits as defined above and maximize level of functional independence with ADLs and functional mobility.     Rehab Resource Intensity Level, OT: III (Minimum Resource Intensity)

## 2024-02-07 NOTE — ASSESSMENT & PLAN NOTE
Sputum culture with stenotrophomonas  S/p 5-day course of Bactrim/minocycline  Follow-up repeat sputum culture ordered by pulmonology

## 2024-02-07 NOTE — ASSESSMENT & PLAN NOTE
Last dose of Rituxan in December, known to cause lung fibrosis  Recommend close follow-up with oncology outpatient for discussion of continuing Rituxan moving forward

## 2024-02-07 NOTE — UTILIZATION REVIEW
Continued Stay Review    Date: 2-7-24                           Current Patient Class: inpatient  Current Level of Care: step down    HPI:59 y.o. female initially admitted on 1-19-24      Assessment/Plan:     Weaned 40L high flow nasal cannula to 12L O2nc mid flow nasal cannula.  Continue sq lovenox, po prednisone 80 mg.        Vital Signs:     Date/Time Temp Pulse Resp BP MAP (mmHg) SpO2 O2 Flow Rate (L/min) O2 Device   02/07/24 1100 -- -- -- 120/67 -- -- -- --   02/07/24 0743 -- -- -- -- -- 96 % 12 L/min Mid flow nasal cannula   02/07/24 0730 98.4 °F (36.9 °C) -- -- 116/68 -- -- -- --   02/07/24 0410 -- -- -- -- -- 97 % 40 L/min High flow nasal cannula   02/07/24 0003 -- 80 20 132/80 -- -- -- High flow nasal cannula   02/07/24 0000 -- -- -- -- -- -- -- High flow nasal cannula       Pertinent Labs/Diagnostic Results:       Results from last 7 days   Lab Units 02/06/24 0454 02/04/24  0559 02/03/24 0446 02/02/24 0706 02/01/24  1447   WBC Thousand/uL 5.07 5.92 8.31 6.75 10.98*   HEMOGLOBIN g/dL 11.5 11.9 13.6 13.3 13.7   HEMATOCRIT % 36.3 36.6 41.9 41.9 43.1   PLATELETS Thousands/uL 162 195 240 247 240   BANDS PCT % 9*  --   --   --   --          Results from last 7 days   Lab Units 02/06/24  0454 02/04/24  0559 02/03/24  0446 02/02/24  0706 02/01/24  1447   SODIUM mmol/L 143 140 137 140 136   POTASSIUM mmol/L 4.3 4.6 5.0 4.9 5.0   CHLORIDE mmol/L 112* 110* 109* 109* 105   CO2 mmol/L 23 21 18* 20* 21   ANION GAP mmol/L 8 9 10 11 10   BUN mg/dL 29* 40* 47* 44* 53*   CREATININE mg/dL 0.99 1.12 1.35* 1.26 1.45*   EGFR ml/min/1.73sq m 63 54 43 47 40   CALCIUM mg/dL 9.5 9.8 10.1 10.0 10.3*   MAGNESIUM mg/dL 1.9 1.9 2.0 2.0 2.1   PHOSPHORUS mg/dL 3.7 3.2 3.5 4.0 4.5         Results from last 7 days   Lab Units 02/07/24  1108 02/07/24  0609 02/06/24  2049 02/06/24  1821 02/06/24  1750 02/06/24  1224 02/06/24  0744 02/05/24  2147 02/05/24  1652 02/05/24  1142 02/05/24  0846 02/05/24  0805   POC GLUCOSE mg/dl 141* 155*  281* 330* 328* 153* 141* 266* 168* 114 85 65     Results from last 7 days   Lab Units 02/06/24  0454 02/04/24  0559 02/03/24  0446 02/02/24  0706 02/01/24  1447   GLUCOSE RANDOM mg/dL 158* 72 93 81 137           Results from last 7 days   Lab Units 02/01/24  1859   CLARITY UA  Clear   COLOR UA  Light Yellow   SPEC GRAV UA  1.012   PH UA  6.5   GLUCOSE UA mg/dl Negative   KETONES UA mg/dl Negative   BLOOD UA  Negative   PROTEIN UA mg/dl Negative   NITRITE UA  Negative   BILIRUBIN UA  Negative   UROBILINOGEN UA (BE) mg/dl <2.0   LEUKOCYTES UA  Negative   WBC UA /hpf 2-4*   RBC UA /hpf None Seen   BACTERIA UA /hpf None Seen   EPITHELIAL CELLS WET PREP /hpf Occasional     Results from last 7 days   Lab Units 02/02/24  0941 02/02/24  0940 02/02/24  0938   GRAM STAIN RESULT  1+ Polys  No bacteria seen No Polys or Bacteria seen Rare Polys  No organisms seen     Scheduled Medications:  enoxaparin, 40 mg, Subcutaneous, Daily  insulin lispro, 1-5 Units, Subcutaneous, 4x Daily (AC & HS)  lamoTRIgine, 150 mg, Oral, BID  melatonin, 6 mg, Oral, HS  nystatin, 500,000 Units, Swish & Spit, 4x Daily  polyethylene glycol, 17 g, Oral, Daily  [START ON 2/8/2024] predniSONE, 80 mg, Oral, Daily  QUEtiapine, 12.5 mg, Oral, HS  topiramate, 100 mg, Oral, BID  venlafaxine, 25 mg, Oral, TID With Meals      Continuous IV Infusions:     PRN Meds:  acetaminophen, 650 mg, Oral, Q6H PRN  bisacodyl, 10 mg, Rectal, Daily PRN  calcium carbonate, 500 mg, Oral, BID PRN  metoprolol, 2.5 mg, Intravenous, Q6H PRN  ondansetron, 4 mg, Intravenous, Q6H PRN  senna-docusate sodium, 2 tablet, Oral, BID PRN  sodium chloride, 1 spray, Each Nare, Q1H PRN        Discharge Plan: to be determined     Network Utilization Review Department  ATTENTION: Please call with any questions or concerns to [phone] and carefully listen to the prompts so that you are directed to the right person. All voicemails are confidential.   For Discharge needs, contact Care Management  DC Support Team at [phone] opt. 2  Send all requests for admission clinical reviews, approved or denied determinations and any other requests to dedicated fax number below belonging to the campus where the patient is receiving treatment. List of dedicated fax numbers for the Facilities:  FACILITY NAME UR FAX NUMBER   ADMISSION DENIALS (Administrative/Medical Necessity) [phone]   DISCHARGE SUPPORT TEAM (NETWORK) [phone]   PARENT CHILD HEALTH (Maternity/NICU/Pediatrics) [phone]   [facility] [phone]   [facility] [phone]   [facility] [phone]   [facility] [phone]   [facility] [phone]   [facility] [phone]   [facility] [phone]   [facility] [phone]   [facility] [phone]   [facility] [phone]   [facility] [phone]   [facility] [phone]

## 2024-02-07 NOTE — PROGRESS NOTES
Progress Note - Infectious Disease   Carla Hunt 59 y.o. female MRN: [id number]  Unit/Bed#: [facility] 722-01 Encounter: [id number]      Impression/Recommendations:  Acute hypoxic respiratory failure, present on admission, secondary to mostly COVID but there was concern for concurrent bacterial pneumonia on admission due to elevated procalcitonin.  Therefore, patient completed treatment course for both COVID and bacterial pneumonia.  She was clinically improved with decreasing O2 requirement.  However, patient has developed worsening hypoxia over the last few days, acutely worse overnight, currently on high flow O2 support.  Chest CT more suggestive of COVID and postviral bacterial pneumonia.  Procalcitonin x 3 have been in the normal range.  Patient has no fever or leukocytosis.  She has minimal cough.  I suspect that her worsening hypoxia is still all secondary to inflammation from recent COVID.  Systemic corticosteroid was restarted.  Respiratory status is improved, most likely secondary to steroid restart.  Patient completed 5-day course of Bactrim/doxycycline for stenotrophomonas in respiratory culture.  Given multiple normal procalcitonins, doubt pneumonia.  Consider tracheobronchitis..  Patient remains on O2 support but has been weaned to mid flow.  Observe off further antibiotic.    Continue systemic corticosteroid.  Monitor temperature/WBC.  Monitor respiratory symptoms  Monitor O2 saturation and requirement.  Plan for O2 weaning per primary and pulmonary service.     Severe COVID, present on admission.  Patient was treated with remdesivir, dexamethasone and was given 1 dose of Tocilizumab.  Given worsening hypoxic respiratory failure and still significant inflammation on chest CT, patient may benefit from another course of systemic corticosteroid.  Patient had 2 negative COVID antigen tests after treatment.  She was started back on systemic corticosteroids.  Hypoxia improving, with O2 support slowly  weaning.  No additional antiviral needed.  Continue systemic corticosteroids.     CKD.  Creatinine improved.  Monitor creatinine.     Encephalopathy on admission.  This has resolved.  There was concern for possible seizure but no witnessed seizure and EEG did not capture it.  Monitor mental status.     Seizure disorder, status post temporal lobe resection in .     B-cell lymphoma, on chemotherapy, which is currently postponed.  Patient was leukopenic on admission but WBC now is near normal range.  Monitor WBC/ANC.     Discussed with patient in detail regarding the above plan.     Antibiotics:  Off antibiotic     Subjective:  Patient is out of ICCU.  She feels well.  Dyspnea improving every day.  Cough minimal now.  Temperature stays down.  No chills.  No diarrhea.      Objective:  Vitals:  Temp:  [97.6 °F (36.4 °C)-98.4 °F (36.9 °C)] 98.4 °F (36.9 °C)  HR:  [] 80  Resp:  [20] 20  BP: (105-132)/(66-80) 120/67  SpO2:  [95 %-99 %] 96 %  Temp (24hrs), Av.1 °F (36.7 °C), Min:97.6 °F (36.4 °C), Max:98.4 °F (36.9 °C)  Current: Temperature: 98.4 °F (36.9 °C)    Physical Exam:     General: Awake, alert, cooperative, no distress.   Neck:  Supple. No mass.  No lymphadenopathy.   Lungs: Expansion symmetric, stable rhonchi, no rales, no wheezing, respirations mildly labored.   Heart:  Regular rate and rhythm, S1 and S2 normal, no murmur.   Abdomen: Soft, nondistended, non-tender, bowel sounds active all four quadrants, no masses, no organomegaly.   Extremities: No edema. No erythema/warmth. No ulcer. Nontender to palpation.   Skin:  No rash.   Neuro: Moves all extremities.     Invasive Devices       Peripheral Intravenous Line  Duration             Peripheral IV 24 Distal;Right;Ventral (anterior) Forearm <1 day              Drain  Duration             External Urinary Catheter <1 day                    Labs studies:   I have personally reviewed pertinent labs.  Results from last 7 days   Lab Units  02/06/24  0454 02/04/24  0559 02/03/24  0446   POTASSIUM mmol/L 4.3 4.6 5.0   CHLORIDE mmol/L 112* 110* 109*   CO2 mmol/L 23 21 18*   BUN mg/dL 29* 40* 47*   CREATININE mg/dL 0.99 1.12 1.35*   EGFR ml/min/1.73sq m 63 54 43   CALCIUM mg/dL 9.5 9.8 10.1     Results from last 7 days   Lab Units 02/06/24  0454 02/04/24  0559 02/03/24  0446   WBC Thousand/uL 5.07 5.92 8.31   HEMOGLOBIN g/dL 11.5 11.9 13.6   PLATELETS Thousands/uL 162 195 240     Results from last 7 days   Lab Units 02/02/24  0941 02/02/24  0940 02/02/24  0938   GRAM STAIN RESULT  1+ Polys  No bacteria seen No Polys or Bacteria seen Rare Polys  No organisms seen       Imaging Studies:   I have personally reviewed pertinent imaging study reports and images in PACS.    EKG, Pathology, and Other Studies:   I have personally reviewed pertinent reports.

## 2024-02-07 NOTE — OCCUPATIONAL THERAPY NOTE
Occupational Therapy Progress Note     Patient Name: Carla Hunt  Today's Date: 2/7/2024  Problem List  Principal Problem:    Acute respiratory failure with hypoxia (HCC)  Active Problems:    Anxiety state    COVID    Stage 3b chronic kidney disease (CKD) (HCC)    Teto marginal zone B-cell lymphoma (HCC)    Seizure disorder (HCC)    Viral pneumonia            02/07/24 1120   OT Last Visit   OT Visit Date 02/07/24   Note Type   Note Type Treatment   Pain Assessment   Pain Assessment Tool 0-10   Pain Score No Pain   Restrictions/Precautions   Weight Bearing Precautions Per Order No   Other Precautions Chair Alarm;Bed Alarm;Fall Risk;Multiple lines;O2;Telemetry  (9 L NC)   ADL   Grooming Assistance 5  Supervision/Setup   Grooming Deficit Supervision/safety;Increased time to complete   Grooming Comments washed hands at sink-side, brushed hair/pony tail seated in recliner   LB Dressing Assistance 4  Minimal Assistance   LB Dressing Deficit Steadying;Supervision/safety;Increased time to complete   Toileting Assistance  4  Minimal Assistance   Toileting Deficit Steadying;Supervison/safety;Increased time to complete   Transfers   Sit to Stand 5  Supervision   Additional items Increased time required   Stand to Sit 5  Supervision   Additional items Increased time required   Toilet transfer 4  Minimal assistance   Additional items Assist x 1;Increased time required   Additional Comments RW   Functional Mobility   Functional Mobility 4  Minimal assistance   Additional Comments w/in room and bathroom. While at sink, desaturated to 75% while on 9 L NC, recovered to 86% with 2 minutes seated rest   Additional items Rolling walker   Cognition   Overall Cognitive Status WFL   Arousal/Participation Alert;Cooperative   Attention Within functional limits   Orientation Level Oriented X4   Memory Within functional limits   Following Commands Follows one step commands without difficulty   Activity Tolerance   Activity Tolerance  Patient limited by fatigue   Medical Staff Made Aware RN   Assessment   Assessment Patient participated in Skilled OT session this date with interventions consisting of ADL re-training, functional transfers/mobility, energy conservation. Patient agreeable to OT treatment session, upon arrival patient was found seated OOB to Recliner.  In comparison to previous session, patient with improvements in activity tolerance, standing balance. Pt reports feeling better today and less SOB. Pt participated in functional mobility to sink-side with 9 L NC; desaturated to 75%, recovered to 86% with 2 minutes seated rest. Pt requiring MIN A for LB ADLs d/t desaturation and decreased balance. Patient continues to be functioning below baseline level, occupational performance remains limited secondary to factors listed above and increased risk for falls and injury.   From OT standpoint, recommendation at time of d/c would be LEVEL III needs. Patient to benefit from continued Occupational Therapy treatment while in the hospital to address deficits as defined above and maximize level of functional independence with ADLs and functional mobility.   Plan   Treatment Interventions ADL retraining;Functional transfer training;Endurance training;Patient/family training;Equipment evaluation/education;Compensatory technique education;Energy conservation;Activityengagement   Goal Expiration Date 02/13/24   OT Treatment Day 5   OT Frequency 3-5x/wk   Discharge Recommendation   Rehab Resource Intensity Level, OT III (Minimum Resource Intensity)   Equipment Recommended Shower/Tub chair with back ($)   AM-PAC Daily Activity Inpatient   Lower Body Dressing 3   Bathing 3   Toileting 3   Upper Body Dressing 3   Grooming 3   Eating 4   Daily Activity Raw Score 19   Daily Activity Standardized Score (Calc for Raw Score >=11) 40.22   AM-PAC Applied Cognition Inpatient   Following a Speech/Presentation 4   Understanding Ordinary Conversation 4   Taking  Medications 4   Remembering Where Things Are Placed or Put Away 4   Remembering List of 4-5 Errands 3   Taking Care of Complicated Tasks 3   Applied Cognition Raw Score 22   Applied Cognition Standardized Score 47.83   End of Consult   Patient Position at End of Consult Bedside chair;Bed/Chair alarm activated;All needs within reach   Nurse Communication Nurse aware of consult         The patient's raw score on the AM-PAC Daily Activity Inpatient Short Form is 19. A raw score of greater than or equal to 19 suggests the patient may benefit from discharge to home. Please refer to the recommendation of the Occupational Therapist for safe discharge planning.        Karlene Quezada, OTR/L

## 2024-02-07 NOTE — ASSESSMENT & PLAN NOTE
CT chest shows extensive fibrosis and groundglass opacity, bronchiectasis consistent with COVID-19 and possible underlying infectious process or fibrosis in the setting of Rituxan.  S/p Actemra, remdesivir  Currently on high-dose IV Solu-Medrol day 3 of 3 followed by oral prednisone 80 mg daily with taper 10 mg weekly  Pulmonology following, appreciate recommendations, will need outpatient follow-up

## 2024-02-07 NOTE — PHYSICAL THERAPY NOTE
Physical Therapy Progress Note     02/07/24 0850   PT Last Visit   PT Visit Date 02/07/24   Note Type   Note Type Treatment   Pain Assessment   Pain Assessment Tool 0-10   Pain Score No Pain   Restrictions/Precautions   Other Precautions Chair Alarm;Bed Alarm;Telemetry;O2;Multiple lines;Fall Risk   Subjective   Subjective The patient was pleased to have walked. She is eager to get back to normal.   Bed Mobility   Supine to Sit 5  Supervision   Additional items Verbal cues   Transfers   Sit to Stand 5  Supervision   Additional items Increased time required   Stand to Sit 5  Supervision   Additional items Verbal cues  (Instruction for hand placement.)   Ambulation/Elevation   Gait pattern Excessively slow   Gait Assistance 5  Supervision   Additional items Verbal cues   Assistive Device Rolling walker   Distance 55 feet, 155 feet, 120 feet.   Balance   Static Sitting Good   Dynamic Sitting Fair +   Static Standing Fair +   Dynamic Standing Fair   Ambulatory Fair -   Activity Tolerance   Activity Tolerance Patient tolerated treatment well   Nurse Made Aware Yes.   Assessment   Prognosis Good   Problem List Decreased mobility;Decreased endurance   Assessment The patient is demonstrating notable improvement in her mobility as she was able to attain community distances with ambulation today. She had no loss of balance, and she noted no symptoms even though she does desaturation. She initially was trialed on 8L O2 where she desaturated to 76% after fifty-five feet of gait. Increased her to the next setting on the portable tank of 15L, and she recovered within three minutes. She then ambulated down the allen with SPO2 gradually dropping to 78% after one-hundred-andfifty-five feet. She recovred within 120 seconds this time. With the third ambulatory trial she was able to maintain SPO2 above 82%, and she recovered within 90 seconds. She was in the chair post session with all needs within reach. Of note assisted restorative  ambulate the patient later in the afternoon and the patient had improved oxygen saturation and much faster recovery. Will continue to follow.   Barriers to Discharge None   Goals   Patient Goals To get back to normal.   STG Expiration Date 02/13/24   PT Treatment Day 7   Plan   Treatment/Interventions Functional transfer training;LE strengthening/ROM;Elevations;Therapeutic exercise;Endurance training;Patient/family training;Bed mobility;Gait training   Progress Improving as expected   PT Frequency 2-3x/wk   Discharge Recommendation   Rehab Resource Intensity Level, PT III (Minimum Resource Intensity)   Equipment Recommended Walker   Walker Package Recommended Wheeled walker   AM-PAC Basic Mobility Inpatient   Turning in Flat Bed Without Bedrails 4   Lying on Back to Sitting on Edge of Flat Bed Without Bedrails 3   Moving Bed to Chair 3   Standing Up From Chair Using Arms 3   Walk in Room 3   Climb 3-5 Stairs With Railing 3   Basic Mobility Inpatient Raw Score 19   Basic Mobility Standardized Score 42.48   Highest Level Of Mobility   JH-HLM Goal 6: Walk 10 steps or more   JH-HLM Achieved 8: Walk 250 feet ot more         An AM-PAC Basic Mobility raw score less than 16 suggests the patient may benefit from discharge to post-acute rehab services.    Deon Ramos, PTA

## 2024-02-07 NOTE — ASSESSMENT & PLAN NOTE
Unclear etiology although likely secondary to severe COVID-19 pneumonia in the setting of immunocompromise state or potentially organizing pneumonia  Wean off HFNC overnight to 10 L nasal cannula  IV Solu-Medrol 1000 mg day 3/3, followed by oral prednisone 80 mg daily with taper by 10 mg weekly until complete  Diuresis per pulmonology, appreciate recommendations  ID following, appreciate recommendations  Currently monitoring off antibiotics, follow-up repeat sputum cultures  Wean oxygen as tolerated

## 2024-02-07 NOTE — PROGRESS NOTES
[facility]  Progress Note  Name: Carla Hunt I  MRN: [id number]  Unit/Bed#: [facility] 722-01 I Date of Admission: 1/10/2024   Date of Service: 2/7/2024 I Hospital Day: 28    Assessment/Plan   * Acute respiratory failure with hypoxia (HCC)  Assessment & Plan  Unclear etiology although likely secondary to severe COVID-19 pneumonia in the setting of immunocompromise state or potentially organizing pneumonia  Wean off HFNC overnight to 10 L nasal cannula  IV Solu-Medrol 1000 mg day 3/3, followed by oral prednisone 80 mg daily with taper by 10 mg weekly until complete  Diuresis per pulmonology, appreciate recommendations  ID following, appreciate recommendations  Currently monitoring off antibiotics, follow-up repeat sputum cultures  Wean oxygen as tolerated    COVID  Assessment & Plan  CT chest shows extensive fibrosis and groundglass opacity, bronchiectasis consistent with COVID-19 and possible underlying infectious process or fibrosis in the setting of Rituxan.  S/p Actemra, remdesivir  Currently on high-dose IV Solu-Medrol day 3 of 3 followed by oral prednisone 80 mg daily with taper 10 mg weekly  Pulmonology following, appreciate recommendations, will need outpatient follow-up      Pneumonia of both lungs due to infectious organism  Assessment & Plan  Sputum culture with stenotrophomonas  S/p 5-day course of Bactrim/minocycline  Follow-up repeat sputum culture ordered by pulmonology    Seizure disorder ([facility])  Assessment & Plan  S/p Temporal lobectomy in 2007  Continue home Lamictal and Topamax    Teto marginal zone B-cell lymphoma ([facility])  Assessment & Plan  Last dose of Rituxan in December, known to cause lung fibrosis  Recommend close follow-up with oncology outpatient for discussion of continuing Rituxan moving forward    Stage 3b chronic kidney disease (CKD) ([facility])  Assessment & Plan  Lab Results   Component Value Date    EGFR 63 02/06/2024    EGFR 54 02/04/2024    EGFR 43  2024    CREATININE 0.99 2024    CREATININE 1.12 2024    CREATININE 1.35 (H) 2024   Cr at baseline   Avoid nephrotoxins             VTE Pharmacologic Prophylaxis: VTE Score: 11 High Risk (Score >/= 5) - Pharmacological DVT Prophylaxis Ordered: enoxaparin (Lovenox). Sequential Compression Devices Ordered.    Mobility:   Basic Mobility Inpatient Raw Score: 19  JH-HLM Goal: 6: Walk 10 steps or more  JH-HLM Achieved: 7: Walk 25 feet or more  HLM Goal achieved. Continue to encourage appropriate mobility.    Patient Centered Rounds: I performed bedside rounds with nursing staff today.   Discussions with Specialists or Other Care Team Provider: primary RN    Education and Discussions with Family / Patient: Updated  () at bedside.    Total Time Spent on Date of Encounter in care of patient: 25 mins. This time was spent on one or more of the following: performing physical exam; counseling and coordination of care; obtaining or reviewing history; documenting in the medical record; reviewing/ordering tests, medications or procedures; communicating with other healthcare professionals and discussing with patient's family/caregivers.    Current Length of Stay: 28 day(s)  Current Patient Status: Inpatient   Certification Statement: The patient will continue to require additional inpatient hospital stay due to respiratory failure requiring mid flow oxygen, high-dose IV steroids  Discharge Plan: Anticipate discharge in 48-72 hrs to discharge location to be determined pending rehab evaluations.    Code Status: Level 1 - Full Code    Subjective:   Patient assessed at bedside.  Weaned off high flow nasal cannula overnight to 10 L this morning.  Reports breathing is significantly better.  Reports she does not feel as fatigued when moving around in the bed.  No other complaints.    Objective:     Vitals:   Temp (24hrs), Av.1 °F (36.7 °C), Min:97.6 °F (36.4 °C), Max:98.4 °F (36.9  °C)    Temp:  [97.6 °F (36.4 °C)-98.4 °F (36.9 °C)] 98.4 °F (36.9 °C)  HR:  [] 80  Resp:  [20] 20  BP: (105-132)/(66-80) 120/67  SpO2:  [95 %-99 %] 96 %  Body mass index is 24.47 kg/m².     Input and Output Summary (last 24 hours):     Intake/Output Summary (Last 24 hours) at 2/7/2024 1539  Last data filed at 2/7/2024 1100  Gross per 24 hour   Intake 520 ml   Output 500 ml   Net 20 ml       Physical Exam:   Physical Exam  Vitals reviewed.   Constitutional:       General: She is not in acute distress.     Appearance: Normal appearance. She is not ill-appearing.   HENT:      Head: Normocephalic and atraumatic.   Cardiovascular:      Rate and Rhythm: Normal rate and regular rhythm.      Heart sounds: Normal heart sounds.   Pulmonary:      Effort: Pulmonary effort is normal. No respiratory distress.      Breath sounds: Rales present. No wheezing.   Abdominal:      General: Bowel sounds are normal.      Tenderness: There is no abdominal tenderness.   Musculoskeletal:      Right lower leg: No edema.      Left lower leg: No edema.   Skin:     General: Skin is warm and dry.   Neurological:      Mental Status: She is alert and oriented to person, place, and time. Mental status is at baseline.   Psychiatric:         Mood and Affect: Mood normal.         Behavior: Behavior normal.          Additional Data:     Labs:  Results from last 7 days   Lab Units 02/06/24  0454   WBC Thousand/uL 5.07   HEMOGLOBIN g/dL 11.5   HEMATOCRIT % 36.3   PLATELETS Thousands/uL 162   BANDS PCT % 9*   LYMPHO PCT % 0*   MONO PCT % 2*   EOS PCT % 0     Results from last 7 days   Lab Units 02/06/24  0454   SODIUM mmol/L 143   POTASSIUM mmol/L 4.3   CHLORIDE mmol/L 112*   CO2 mmol/L 23   BUN mg/dL 29*   CREATININE mg/dL 0.99   ANION GAP mmol/L 8   CALCIUM mg/dL 9.5   GLUCOSE RANDOM mg/dL 158*         Results from last 7 days   Lab Units 02/07/24  1108 02/07/24  0609 02/06/24  2049 02/06/24  1821 02/06/24  1750 02/06/24  1224 02/06/24  0744  02/05/24  2147 02/05/24  1652 02/05/24  1142 02/05/24  0846 02/05/24  0805   POC GLUCOSE mg/dl 141* 155* 281* 330* 328* 153* 141* 266* 168* 114 85 65               Lines/Drains:  Invasive Devices       Peripheral Intravenous Line  Duration             Peripheral IV 02/06/24 Distal;Right;Ventral (anterior) Forearm 1 day              Drain  Duration             External Urinary Catheter <1 day                          Imaging: Reviewed radiology reports from this admission including: chest CT scan    Recent Cultures (last 7 days):   Results from last 7 days   Lab Units 02/02/24  0941 02/02/24  0940 02/02/24  0938   GRAM STAIN RESULT  1+ Polys  No bacteria seen No Polys or Bacteria seen Rare Polys  No organisms seen       Last 24 Hours Medication List:   Current Facility-Administered Medications   Medication Dose Route Frequency Provider Last Rate    acetaminophen  650 mg Oral Q6H PRN Risa Handley MD      bisacodyl  10 mg Rectal Daily PRN Risa Handley MD      calcium carbonate  500 mg Oral BID PRN Risa Handley MD      enoxaparin  40 mg Subcutaneous Daily Risa Handley MD      insulin lispro  1-5 Units Subcutaneous 4x Daily (AC & HS) Risa Handley MD      lamoTRIgine  150 mg Oral BID Risa Handley MD      melatonin  6 mg Oral HS Risa Handley MD      metoprolol  2.5 mg Intravenous Q6H PRN Risa Handley MD      nystatin  500,000 Units Swish & Spit 4x Daily Risa Handley MD      ondansetron  4 mg Intravenous Q6H PRN Risa Handley MD      polyethylene glycol  17 g Oral Daily Risa Handley MD      [START ON 2/8/2024] predniSONE  80 mg Oral Daily Risa Handley MD      QUEtiapine  12.5 mg Oral HS Risa Handley MD      senna-docusate sodium  2 tablet Oral BID PRN Risa Handley MD      sodium chloride  1 spray Each Nare Q1H PRN Risa Handley MD      topiramate  100 mg Oral BID Risa Handley MD      venlafaxine  25 mg Oral TID With Meals Risa Handley MD          Today, Patient Was Seen By: Nash Calle  DO    **Please Note: This note may have been constructed using a voice recognition system.**

## 2024-02-07 NOTE — PLAN OF CARE
Problem: Prexisting or High Potential for Compromised Skin Integrity  Goal: Skin integrity is maintained or improved  Description: INTERVENTIONS:  - Identify patients at risk for skin breakdown  - Assess and monitor skin integrity  - Assess and monitor nutrition and hydration status  - Monitor labs   - Assess for incontinence   - Turn and reposition patient  - Assist with mobility/ambulation  - Relieve pressure over bony prominences  - Avoid friction and shearing  - Provide appropriate hygiene as needed including keeping skin clean and dry  - Evaluate need for skin moisturizer/barrier cream  - Collaborate with interdisciplinary team   - Patient/family teaching  - Consider wound care consult   2/6/2024 2309 by Hallie Woods, RN  Reactivated  2/6/2024 2309 by Hallie Woods, RN  Outcome: Adequate for Discharge

## 2024-02-07 NOTE — RESTORATIVE TECHNICIAN NOTE
Restorative Technician Note      Patient Name: Carla Hunt     Note Type: Mobility  Patient Position Upon Consult: Supine  Activity Performed: Ambulated; Dangled; Stood  Assistive Device: Roller walker; Other (Comment) (Assist x1 with chair follow and NC O2 on 10L in room at rest and 15L for ambulation. Assisted PTA Deon.)  Education Provided: Yes  Patient Position at End of Consult: Bedside chair; All needs within reach; Bed/Chair alarm activated    Héctor LAYNE, Restorative Technician,

## 2024-02-08 PROBLEM — I95.9 HYPOTENSION: Status: ACTIVE | Noted: 2024-02-08

## 2024-02-08 LAB
ANION GAP SERPL CALCULATED.3IONS-SCNC: 6 MMOL/L
BUN SERPL-MCNC: 32 MG/DL (ref 5–25)
CALCIUM SERPL-MCNC: 9.4 MG/DL (ref 8.4–10.2)
CHLORIDE SERPL-SCNC: 107 MMOL/L (ref 96–108)
CO2 SERPL-SCNC: 30 MMOL/L (ref 21–32)
CREAT SERPL-MCNC: 1.08 MG/DL (ref 0.6–1.3)
ERYTHROCYTE [DISTWIDTH] IN BLOOD BY AUTOMATED COUNT: 17.8 % (ref 11.6–15.1)
GFR SERPL CREATININE-BSD FRML MDRD: 56 ML/MIN/1.73SQ M
GLUCOSE SERPL-MCNC: 101 MG/DL (ref 65–140)
GLUCOSE SERPL-MCNC: 122 MG/DL (ref 65–140)
GLUCOSE SERPL-MCNC: 163 MG/DL (ref 65–140)
GLUCOSE SERPL-MCNC: 213 MG/DL (ref 65–140)
GLUCOSE SERPL-MCNC: 252 MG/DL (ref 65–140)
HCT VFR BLD AUTO: 35.8 % (ref 34.8–46.1)
HGB BLD-MCNC: 11.5 G/DL (ref 11.5–15.4)
MCH RBC QN AUTO: 29.9 PG (ref 26.8–34.3)
MCHC RBC AUTO-ENTMCNC: 32.1 G/DL (ref 31.4–37.4)
MCV RBC AUTO: 93 FL (ref 82–98)
PLATELET # BLD AUTO: 169 THOUSANDS/UL (ref 149–390)
PMV BLD AUTO: 11.3 FL (ref 8.9–12.7)
POTASSIUM SERPL-SCNC: 3.7 MMOL/L (ref 3.5–5.3)
RBC # BLD AUTO: 3.84 MILLION/UL (ref 3.81–5.12)
SODIUM SERPL-SCNC: 143 MMOL/L (ref 135–147)
WBC # BLD AUTO: 8.4 THOUSAND/UL (ref 4.31–10.16)

## 2024-02-08 PROCEDURE — 99232 SBSQ HOSP IP/OBS MODERATE 35: CPT | Performed by: INTERNAL MEDICINE

## 2024-02-08 PROCEDURE — 94760 N-INVAS EAR/PLS OXIMETRY 1: CPT | Performed by: SOCIAL WORKER

## 2024-02-08 PROCEDURE — 99233 SBSQ HOSP IP/OBS HIGH 50: CPT | Performed by: INTERNAL MEDICINE

## 2024-02-08 PROCEDURE — 94760 N-INVAS EAR/PLS OXIMETRY 1: CPT

## 2024-02-08 PROCEDURE — 84145 PROCALCITONIN (PCT): CPT | Performed by: STUDENT IN AN ORGANIZED HEALTH CARE EDUCATION/TRAINING PROGRAM

## 2024-02-08 PROCEDURE — 80048 BASIC METABOLIC PNL TOTAL CA: CPT | Performed by: STUDENT IN AN ORGANIZED HEALTH CARE EDUCATION/TRAINING PROGRAM

## 2024-02-08 PROCEDURE — 99232 SBSQ HOSP IP/OBS MODERATE 35: CPT | Performed by: STUDENT IN AN ORGANIZED HEALTH CARE EDUCATION/TRAINING PROGRAM

## 2024-02-08 PROCEDURE — 82948 REAGENT STRIP/BLOOD GLUCOSE: CPT

## 2024-02-08 PROCEDURE — 85027 COMPLETE CBC AUTOMATED: CPT | Performed by: STUDENT IN AN ORGANIZED HEALTH CARE EDUCATION/TRAINING PROGRAM

## 2024-02-08 PROCEDURE — 97530 THERAPEUTIC ACTIVITIES: CPT

## 2024-02-08 PROCEDURE — 97116 GAIT TRAINING THERAPY: CPT

## 2024-02-08 PROCEDURE — 86140 C-REACTIVE PROTEIN: CPT | Performed by: STUDENT IN AN ORGANIZED HEALTH CARE EDUCATION/TRAINING PROGRAM

## 2024-02-08 RX ORDER — AMOXICILLIN 250 MG
2 CAPSULE ORAL 2 TIMES DAILY PRN
Status: DISCONTINUED | OUTPATIENT
Start: 2024-02-08 | End: 2024-02-18

## 2024-02-08 RX ORDER — FUROSEMIDE 40 MG/1
40 TABLET ORAL ONCE
Status: DISCONTINUED | OUTPATIENT
Start: 2024-02-08 | End: 2024-02-08

## 2024-02-08 RX ORDER — ALBUMIN, HUMAN INJ 5% 5 %
12.5 SOLUTION INTRAVENOUS ONCE
Status: COMPLETED | OUTPATIENT
Start: 2024-02-08 | End: 2024-02-08

## 2024-02-08 RX ORDER — SULFAMETHOXAZOLE AND TRIMETHOPRIM 800; 160 MG/1; MG/1
1 TABLET ORAL 3 TIMES WEEKLY
Status: DISCONTINUED | OUTPATIENT
Start: 2024-02-09 | End: 2024-02-10

## 2024-02-08 RX ADMIN — TOPIRAMATE 100 MG: 100 TABLET, FILM COATED ORAL at 17:02

## 2024-02-08 RX ADMIN — INSULIN LISPRO 2 UNITS: 100 INJECTION, SOLUTION INTRAVENOUS; SUBCUTANEOUS at 17:02

## 2024-02-08 RX ADMIN — INSULIN LISPRO 2 UNITS: 100 INJECTION, SOLUTION INTRAVENOUS; SUBCUTANEOUS at 21:12

## 2024-02-08 RX ADMIN — NYSTATIN 500000 UNITS: 100000 SUSPENSION ORAL at 15:20

## 2024-02-08 RX ADMIN — SENNOSIDES, DOCUSATE SODIUM 2 TABLET: 8.6; 5 TABLET ORAL at 08:29

## 2024-02-08 RX ADMIN — VENLAFAXINE 25 MG: 25 TABLET ORAL at 17:02

## 2024-02-08 RX ADMIN — INSULIN LISPRO 1 UNITS: 100 INJECTION, SOLUTION INTRAVENOUS; SUBCUTANEOUS at 11:50

## 2024-02-08 RX ADMIN — NYSTATIN 500000 UNITS: 100000 SUSPENSION ORAL at 05:06

## 2024-02-08 RX ADMIN — POLYETHYLENE GLYCOL 3350 17 G: 17 POWDER, FOR SOLUTION ORAL at 08:23

## 2024-02-08 RX ADMIN — LAMOTRIGINE 150 MG: 100 TABLET ORAL at 08:30

## 2024-02-08 RX ADMIN — PREDNISONE 80 MG: 20 TABLET ORAL at 08:29

## 2024-02-08 RX ADMIN — NYSTATIN 500000 UNITS: 100000 SUSPENSION ORAL at 17:01

## 2024-02-08 RX ADMIN — ACETAMINOPHEN 650 MG: 325 TABLET, FILM COATED ORAL at 08:30

## 2024-02-08 RX ADMIN — NYSTATIN 500000 UNITS: 100000 SUSPENSION ORAL at 11:50

## 2024-02-08 RX ADMIN — VENLAFAXINE 25 MG: 25 TABLET ORAL at 11:50

## 2024-02-08 RX ADMIN — TOPIRAMATE 100 MG: 100 TABLET, FILM COATED ORAL at 08:30

## 2024-02-08 RX ADMIN — ENOXAPARIN SODIUM 40 MG: 40 INJECTION SUBCUTANEOUS at 08:31

## 2024-02-08 RX ADMIN — LAMOTRIGINE 150 MG: 100 TABLET ORAL at 21:09

## 2024-02-08 RX ADMIN — MELATONIN 6 MG: at 21:09

## 2024-02-08 RX ADMIN — VENLAFAXINE 25 MG: 25 TABLET ORAL at 08:46

## 2024-02-08 RX ADMIN — QUETIAPINE FUMARATE 12.5 MG: 25 TABLET ORAL at 21:09

## 2024-02-08 RX ADMIN — ALBUMIN (HUMAN) 12.5 G: 12.5 INJECTION, SOLUTION INTRAVENOUS at 12:57

## 2024-02-08 NOTE — PLAN OF CARE
Problem: PHYSICAL THERAPY ADULT  Goal: Performs mobility at highest level of function for planned discharge setting.  See evaluation for individualized goals.  Description:    Equipment Recommended:  (none)       See flowsheet documentation for full assessment, interventions and recommendations.  Outcome: Progressing  Note: Prognosis: Good  Problem List: Decreased mobility, Decreased endurance  Assessment: The patient did have some hypotension this morning, but she again was able to ambulate in the hallways. She did not drop as low as yesterday as she remained at the lowest 78% but primarily in the 80% range during gait on 15L. She was again educated on energy conservation techniques and pursed-lip breathing. The patient recovered within 90 seconds with each trial today which is also an improvement from yesterday.  Barriers to Discharge: None     Rehab Resource Intensity Level, PT: III (Minimum Resource Intensity)    See flowsheet documentation for full assessment.

## 2024-02-08 NOTE — ASSESSMENT & PLAN NOTE
CT chest shows extensive fibrosis and groundglass opacity, bronchiectasis consistent with COVID-19 and possible underlying infectious process or fibrosis in the setting of Rituxan.  S/p Actemra, remdesivir  S/p high-dose IV Solu-Medrol x3 days, now on oral prednisone 80 mg daily (2/8) with taper 10 mg weekly  Pulmonology following, appreciate recommendations, will need outpatient follow-up

## 2024-02-08 NOTE — RESTORATIVE TECHNICIAN NOTE
Restorative Technician Note      Patient Name: Carla Hunt     Note Type: Mobility  Patient Position Upon Consult: Supine  Activity Performed: Ambulated; Dangled; Stood  Assistive Device: Roller walker; Other (Comment) (Assist x1 with NC O2 on 15L in room at rest and 15L for ambulation in halls with a chair follow.)  Education Provided: Yes  Patient Position at End of Consult: Bedside chair; All needs within reach; Bed/Chair alarm activated      Héctor LAYNE, Restorative Technician,

## 2024-02-08 NOTE — PROGRESS NOTES
Progress Note - Infectious Disease   Carla Hunt 59 y.o. female MRN: [id number]  Unit/Bed#: [facility] 722-01 Encounter: [id number]      Impression/Recommendations:  Acute hypoxic respiratory failure, present on admission, secondary to mostly COVID but there was concern for concurrent bacterial pneumonia on admission due to elevated procalcitonin.  Therefore, patient completed treatment course for both COVID and bacterial pneumonia.  She was clinically improved with decreasing O2 requirement.  However, patient has developed worsening hypoxia over the last few days, acutely worse overnight, currently on high flow O2 support.  Chest CT more suggestive of COVID and postviral bacterial pneumonia.  Procalcitonin x 3 have been in the normal range.  Patient has no fever or leukocytosis.  She has minimal cough.  I suspect that her worsening hypoxia is still all secondary to inflammation from recent COVID.  Systemic corticosteroid was restarted.  Respiratory status is improved, most likely secondary to steroid restart.  Patient completed 5-day course of Bactrim/doxycycline for stenotrophomonas in respiratory culture.  Patient remains on O2 support but it is being weaned.  Observe off further antibiotic.    Continue systemic corticosteroid.  Monitor temperature/WBC.  Monitor respiratory symptoms  Monitor O2 saturation and requirement.  Plan for O2 weaning per primary and pulmonary service.     Severe COVID, present on admission.  Patient was treated with remdesivir, dexamethasone and was given 1 dose of Tocilizumab.  Given worsening hypoxic respiratory failure and still significant inflammation on chest CT, patient may benefit from another course of systemic corticosteroid.  Patient had 2 negative COVID antigen tests after treatment.  She was started back on systemic corticosteroids.  Hypoxia improving, with O2 support slowly weaning.  No additional antiviral needed.  Continue systemic corticosteroids.     CKD.  Creatinine  improved.  Monitor creatinine.     Encephalopathy on admission.  This has resolved.  There was concern for possible seizure but no witnessed seizure and EEG did not capture it.  Monitor mental status.     Seizure disorder, status post temporal lobe resection in .     B-cell lymphoma, on chemotherapy, which is currently postponed.  Patient was leukopenic on admission but WBC now is near normal range.  Monitor WBC/ANC.     Discussed with patient in detail regarding the above plan.    With no further active infection, I will sign off.  Thank you for the consultation.  Please do not hesitate to reconsult us for any further questions or issues.     Antibiotics:  Off antibiotic     Subjective:  Patient is comfortable.  Dyspnea improving every day.  Cough minimal now.  Temperature stays down.  No chills.  No diarrhea.      Objective:  Vitals:  Temp:  [98.3 °F (36.8 °C)-98.8 °F (37.1 °C)] 98.3 °F (36.8 °C)  HR:  [] 101  Resp:  [20-23] 23  BP: (115-143)/(62-76) 115/69  SpO2:  [91 %-99 %] 99 %  Temp (24hrs), Av.6 °F (37 °C), Min:98.3 °F (36.8 °C), Max:98.8 °F (37.1 °C)  Current: Temperature: 98.3 °F (36.8 °C)    Physical Exam:     General: Awake, alert, cooperative, no distress.   Neck:  Supple. No mass.  No lymphadenopathy.   Lungs: Expansion symmetric, sparse basilar rhonchi, no rales, no wheezing, respirations mildly labored.   Heart:  Regular rate and rhythm, S1 and S2 normal, no murmur.   Abdomen: Soft, nondistended, non-tender, bowel sounds active all four quadrants, no masses, no organomegaly.   Extremities: No edema. No erythema/warmth. No ulcer. Nontender to palpation.   Skin:  No rash.   Neuro: Moves all extremities.     Invasive Devices       Peripheral Intravenous Line  Duration             Peripheral IV 24 Distal;Right;Ventral (anterior) Forearm 1 day                    Labs studies:   I have personally reviewed pertinent labs.  Results from last 7 days   Lab Units 24  8934  02/06/24  0454 02/04/24  0559   POTASSIUM mmol/L 3.7 4.3 4.6   CHLORIDE mmol/L 107 112* 110*   CO2 mmol/L 30 23 21   BUN mg/dL 32* 29* 40*   CREATININE mg/dL 1.08 0.99 1.12   EGFR ml/min/1.73sq m 56 63 54   CALCIUM mg/dL 9.4 9.5 9.8     Results from last 7 days   Lab Units 02/08/24  0504 02/06/24  0454 02/04/24  0559   WBC Thousand/uL 8.40 5.07 5.92   HEMOGLOBIN g/dL 11.5 11.5 11.9   PLATELETS Thousands/uL 169 162 195     Results from last 7 days   Lab Units 02/02/24  0941 02/02/24  0940 02/02/24  0938   GRAM STAIN RESULT  1+ Polys  No bacteria seen No Polys or Bacteria seen Rare Polys  No organisms seen       Imaging Studies:   I have personally reviewed pertinent imaging study reports and images in PACS.    EKG, Pathology, and Other Studies:   I have personally reviewed pertinent reports.

## 2024-02-08 NOTE — PLAN OF CARE
Problem: Prexisting or High Potential for Compromised Skin Integrity  Goal: Skin integrity is maintained or improved  Description: INTERVENTIONS:  - Identify patients at risk for skin breakdown  - Assess and monitor skin integrity  - Assess and monitor nutrition and hydration status  - Monitor labs   - Assess for incontinence   - Turn and reposition patient  - Assist with mobility/ambulation  - Relieve pressure over bony prominences  - Avoid friction and shearing  - Provide appropriate hygiene as needed including keeping skin clean and dry  - Evaluate need for skin moisturizer/barrier cream  - Collaborate with interdisciplinary team   - Patient/family teaching  - Consider wound care consult   Outcome: Progressing     Problem: INFECTION - ADULT  Goal: Absence or prevention of progression during hospitalization  Description: INTERVENTIONS:  - Assess and monitor for signs and symptoms of infection  - Monitor lab/diagnostic results  - Monitor all insertion sites, i.e. indwelling lines, tubes, and drains  - Monitor endotracheal if appropriate and nasal secretions for changes in amount and color  - [place] appropriate cooling/warming therapies per order  - Administer medications as ordered  - Instruct and encourage patient and family to use good hand hygiene technique  - Identify and instruct in appropriate isolation precautions for identified infection/condition  Outcome: Progressing     Problem: SAFETY ADULT  Goal: Patient will remain free of falls  Description: INTERVENTIONS:  - Educate patient/family on patient safety including physical limitations  - Instruct patient to call for assistance with activity   - Consult OT/PT to assist with strengthening/mobility   - Keep Call bell within reach  - Keep bed low and locked with side rails adjusted as appropriate  - Keep care items and personal belongings within reach  - Initiate and maintain comfort rounds  - Make Fall Risk Sign visible to staff  - Offer Toileting every  2 Hours, in advance of need  - Initiate/Maintain bed alarm  - Obtain necessary fall risk management equipment: non skid footwear  - Apply yellow socks and bracelet for high fall risk patients  - Consider moving patient to room near nurses station  Outcome: Progressing     Problem: RESPIRATORY - ADULT  Goal: Achieves optimal ventilation and oxygenation  Description: INTERVENTIONS:  - Assess for changes in respiratory status  - Assess for changes in mentation and behavior  - Position to facilitate oxygenation and minimize respiratory effort  - Oxygen administered by appropriate delivery if ordered  - Initiate smoking cessation education as indicated  - Encourage broncho-pulmonary hygiene including cough, deep breathe, Incentive Spirometry  - Assess the need for suctioning and aspirate as needed  - Assess and instruct to report SOB or any respiratory difficulty  - Respiratory Therapy support as indicated  Outcome: Progressing     Problem: SKIN/TISSUE INTEGRITY - ADULT  Goal: Skin Integrity remains intact(Skin Breakdown Prevention)  Description: Assess:  -Perform Flavio assessment every shift   -Clean and moisturize skin every day   -Inspect skin when repositioning, toileting, and assisting with ADLS  -Assess under medical devices such as ronny  every hour   -Assess extremities for adequate circulation and sensation     Bed Management:  -Have minimal linens on bed & keep smooth, unwrinkled  -Change linens as needed when moist or perspiring  -Avoid sitting or lying in one position for more than 2 hours while in bed  -Keep HOB at 45 degrees     Toileting:  -Offer bedside commode  -Assess for incontinence every hour  -Use incontinent care products after each incontinent episode such as moisture barrier cream     Activity:  -Mobilize patient 3 times a day  -Encourage activity and walks on unit  -Encourage or provide ROM exercises   -Turn and reposition patient every 2 Hours  -Use appropriate equipment to lift or move  patient in bed  -Instruct/ Assist with weight shifting every hour when out of bed in chair  -Consider limitation of chair time 2 hour intervals    Skin Care:  -Avoid use of baby powder, tape, friction and shearing, hot water or constrictive clothing  -Relieve pressure over bony prominences using allevyn   -Do not massage red bony areas    Next Steps:  -Teach patient strategies to minimize risks such as weight shifting    -Consider consults to  interdisciplinary teams such as PT  Outcome: Progressing     Problem: Potential for Falls  Goal: Patient will remain free of falls  Description: INTERVENTIONS:  - Educate patient/family on patient safety including physical limitations  - Instruct patient to call for assistance with activity   - Consult OT/PT to assist with strengthening/mobility   - Keep Call bell within reach  - Keep bed low and locked with side rails adjusted as appropriate  - Keep care items and personal belongings within reach  - Initiate and maintain comfort rounds  - Make Fall Risk Sign visible to staff  - Offer Toileting every 2 Hours, in advance of need  - Initiate/Maintain bed alarm  - Obtain necessary fall risk management equipment: non skid footwear  - Apply yellow socks and bracelet for high fall risk patients  - Consider moving patient to room near nurses station  Outcome: Progressing

## 2024-02-08 NOTE — ASSESSMENT & PLAN NOTE
Fluzone given IM RIGHT Deltoid; VIS given; No adverse reaction noted; patient asked to remain in clinic for 15 minutes post injection.     Lab Results   Component Value Date    EGFR 56 02/08/2024    EGFR 63 02/06/2024    EGFR 54 02/04/2024    CREATININE 1.08 02/08/2024    CREATININE 0.99 02/06/2024    CREATININE 1.12 02/04/2024   Cr at baseline   Avoid nephrotoxins

## 2024-02-08 NOTE — RESTORATIVE TECHNICIAN NOTE
Restorative Technician Note      Patient Name: Carla Hunt     Note Type: Mobility  Patient Position Upon Consult: Supine  Activity Performed: Ambulated; Dangled; Stood  Assistive Device: Roller walker; Other (Comment) (Assist x1 with NC O2 on 8L in room at rest and 15L for ambulation in halls with a chair follow.)  Education Provided: Yes  Patient Position at End of Consult: Bedside chair; All needs within reach; Bed/Chair alarm activated    Héctor LAYNE, Restorative Technician,

## 2024-02-08 NOTE — RESPIRATORY THERAPY NOTE
Resp care   02/08/24 0812   Respiratory Assessment   Resp Comments pt found on 15lpm mf. states 02 was out of her nose earlier. Decreased to 10 and now to 8. Pt c/o burning in nose. will change to 6lpm low flow nc with next check is sat remains >90%.   Oxygen Therapy/Pulse Ox   O2 Device Mid flow nasal cannula   FiO2 (%) 8   SpO2 99 %   $ Pulse Oximetry Spot Check Charge Completed

## 2024-02-08 NOTE — ASSESSMENT & PLAN NOTE
Unclear etiology although likely secondary to severe COVID-19 pneumonia in the setting of immunocompromise state or potentially organizing pneumonia  S/p IV Solu-Medrol 1000 mg x 3 days, now on oral prednisone 80 mg daily with taper by 10 mg weekly until complete  Diuresis per pulmonology, appreciate recommendations  ID following, appreciate recommendations  Currently monitoring off antibiotics, follow-up repeat sputum cultures  Suddenly required 15L midflow with o2 in the 70s, improved with NRB, now stable on 8L midflow  Started on PJP ppx   Strict I/O

## 2024-02-08 NOTE — RESPIRATORY THERAPY NOTE
Resp care   02/08/24 1532   Respiratory Assessment   Resp Comments 02 sat 98% on 6lpm MF. changed to low flow nc 6lpm. sat remains 94%. Pt states desats take place with movement, talking. Pt encourged to breathe slowly and deeply during desaturations. Pt states she recovers quickly when this happens, but 02 is turned up by staff.   Oxygen Therapy/Pulse Ox   O2 Device Nasal cannula   Nasal Cannula O2 Flow Rate (L/min) 6 L/min   Calculated FIO2 (%) - Nasal Cannula 44   SpO2 94 %   $ Pulse Oximetry Spot Check Charge Completed

## 2024-02-08 NOTE — PROGRESS NOTES
[facility]  Progress Note  Name: Carla Hunt I  MRN: [id number]  Unit/Bed#: [facility] 722-01 I Date of Admission: 1/10/2024   Date of Service: 2/8/2024 I Hospital Day: 29    Assessment/Plan   * Acute respiratory failure with hypoxia (HCC)  Assessment & Plan  Unclear etiology although likely secondary to severe COVID-19 pneumonia in the setting of immunocompromise state or potentially organizing pneumonia  S/p IV Solu-Medrol 1000 mg x 3 days, now on oral prednisone 80 mg daily with taper by 10 mg weekly until complete  Diuresis per pulmonology, appreciate recommendations  ID following, appreciate recommendations  Currently monitoring off antibiotics, follow-up repeat sputum cultures  Suddenly required 15L midflow with o2 in the 70s, improved with NRB, now stable on 8L midflow  Started on PJP ppx   Strict I/O    COVID  Assessment & Plan  CT chest shows extensive fibrosis and groundglass opacity, bronchiectasis consistent with COVID-19 and possible underlying infectious process or fibrosis in the setting of Rituxan.  S/p Actemra, remdesivir  S/p high-dose IV Solu-Medrol x3 days, now on oral prednisone 80 mg daily (2/8) with taper 10 mg weekly  Pulmonology following, appreciate recommendations, will need outpatient follow-up      Hypotension  Assessment & Plan  BP 89/59 this afternoon  Received albumin 5% with improvement in BP to low 100s systolic  Continue to monitor  Holding diuretics for today    Pneumonia of both lungs due to infectious organism  Assessment & Plan  Sputum culture with stenotrophomonas  S/p 5-day course of Bactrim/minocycline  Follow-up repeat sputum culture ordered by pulmonology    Seizure disorder (HCC)  Assessment & Plan  S/p Temporal lobectomy in 2007  Continue home Lamictal and Topamax    Teto marginal zone B-cell lymphoma (HCC)  Assessment & Plan  Last dose of Rituxan in December, known to cause lung fibrosis  Recommend close follow-up with oncology  outpatient for discussion of continuing Rituxan moving forward    Stage 3b chronic kidney disease (CKD) ([facility])  Assessment & Plan  Lab Results   Component Value Date    EGFR 56 02/08/2024    EGFR 63 02/06/2024    EGFR 54 02/04/2024    CREATININE 1.08 02/08/2024    CREATININE 0.99 02/06/2024    CREATININE 1.12 02/04/2024   Cr at baseline   Avoid nephrotoxins    Anxiety state  Assessment & Plan  Continue home effexor            VTE Pharmacologic Prophylaxis: VTE Score: 11 High Risk (Score >/= 5) - Pharmacological DVT Prophylaxis Ordered: enoxaparin (Lovenox). Sequential Compression Devices Ordered.    Mobility:   Basic Mobility Inpatient Raw Score: 19  JH-HLM Goal: 6: Walk 10 steps or more  JH-HLM Achieved: 6: Walk 10 steps or more  HLM Goal achieved. Continue to encourage appropriate mobility.    Patient Centered Rounds: I performed bedside rounds with nursing staff today.   Discussions with Specialists or Other Care Team Provider: pulmonology     Education and Discussions with Family / Patient: Updated  () via phone.    Total Time Spent on Date of Encounter in care of patient: 25 mins. This time was spent on one or more of the following: performing physical exam; counseling and coordination of care; obtaining or reviewing history; documenting in the medical record; reviewing/ordering tests, medications or procedures; communicating with other healthcare professionals and discussing with patient's family/caregivers.    Current Length of Stay: 29 day(s)  Current Patient Status: Inpatient   Certification Statement: The patient will continue to require additional inpatient hospital stay due to respiratory failure requiring midflow oxygen  Discharge Plan: Anticipate discharge in 48-72 hrs to discharge location to be determined pending rehab evaluations.    Code Status: Level 1 - Full Code    Subjective:   No complaints aside from mild shortness of breath. Hypoxic in the 70s while on 15L midflow. Placed  on NRB with improved O2 and thereafter taken off.     Objective:     Vitals:   Temp (24hrs), Av.6 °F (37 °C), Min:98.3 °F (36.8 °C), Max:98.8 °F (37.1 °C)    Temp:  [98.3 °F (36.8 °C)-98.8 °F (37.1 °C)] 98.8 °F (37.1 °C)  HR:  [] 94  Resp:  [18-28] 18  BP: ()/(55-76) 108/76  SpO2:  [91 %-99 %] 94 %  Body mass index is 25.07 kg/m².     Input and Output Summary (last 24 hours):     Intake/Output Summary (Last 24 hours) at 2024 1537  Last data filed at 2024 1300  Gross per 24 hour   Intake 840 ml   Output 1525 ml   Net -685 ml       Physical Exam:   Physical Exam  Vitals reviewed.   Constitutional:       General: She is not in acute distress.     Appearance: Normal appearance. She is not ill-appearing.   HENT:      Head: Normocephalic and atraumatic.   Cardiovascular:      Rate and Rhythm: Normal rate and regular rhythm.      Heart sounds: Normal heart sounds.   Pulmonary:      Effort: Pulmonary effort is normal.      Breath sounds: Rales present.   Abdominal:      General: Bowel sounds are normal.      Tenderness: There is no abdominal tenderness.   Musculoskeletal:      Right lower leg: No edema.      Left lower leg: No edema.   Skin:     General: Skin is warm and dry.   Neurological:      Mental Status: She is alert and oriented to person, place, and time. Mental status is at baseline.   Psychiatric:         Mood and Affect: Mood normal.         Behavior: Behavior normal.        Additional Data:     Labs:  Results from last 7 days   Lab Units 24  0504 24  0454   WBC Thousand/uL 8.40 5.07   HEMOGLOBIN g/dL 11.5 11.5   HEMATOCRIT % 35.8 36.3   PLATELETS Thousands/uL 169 162   BANDS PCT %  --  9*   LYMPHO PCT %  --  0*   MONO PCT %  --  2*   EOS PCT %  --  0     Results from last 7 days   Lab Units 24  0504   SODIUM mmol/L 143   POTASSIUM mmol/L 3.7   CHLORIDE mmol/L 107   CO2 mmol/L 30   BUN mg/dL 32*   CREATININE mg/dL 1.08   ANION GAP mmol/L 6   CALCIUM mg/dL 9.4   GLUCOSE  RANDOM mg/dL 122         Results from last 7 days   Lab Units 02/08/24  1104 02/08/24  0612 02/07/24  2135 02/07/24  1651 02/07/24  1108 02/07/24  0609 02/06/24  2049 02/06/24  1821 02/06/24  1750 02/06/24  1224 02/06/24  0744 02/05/24  2147   POC GLUCOSE mg/dl 163* 101 215* 236* 141* 155* 281* 330* 328* 153* 141* 266*               Lines/Drains:  Invasive Devices       Peripheral Intravenous Line  Duration             Peripheral IV 02/06/24 Distal;Right;Ventral (anterior) Forearm 2 days                          Imaging: No pertinent imaging reviewed.    Recent Cultures (last 7 days):   Results from last 7 days   Lab Units 02/02/24  0941 02/02/24  0940 02/02/24  0938   GRAM STAIN RESULT  1+ Polys  No bacteria seen No Polys or Bacteria seen Rare Polys  No organisms seen       Last 24 Hours Medication List:   Current Facility-Administered Medications   Medication Dose Route Frequency Provider Last Rate    acetaminophen  650 mg Oral Q6H PRN Risa Handley MD      bisacodyl  10 mg Rectal Daily PRN Risa Handley MD      calcium carbonate  500 mg Oral BID PRN Risa Handley MD      enoxaparin  40 mg Subcutaneous Daily Risa Handley MD      insulin lispro  1-5 Units Subcutaneous 4x Daily (AC & HS) Risa Handley MD      lamoTRIgine  150 mg Oral BID Risa Handley MD      melatonin  6 mg Oral HS Risa Handley MD      metoprolol  2.5 mg Intravenous Q6H PRN Risa Handley MD      nystatin  500,000 Units Swish & Spit 4x Daily Risa Handley MD      ondansetron  4 mg Intravenous Q6H PRN Risa Handley MD      polyethylene glycol  17 g Oral Daily Risa Handley MD      predniSONE  80 mg Oral Daily Risa Handley MD      QUEtiapine  12.5 mg Oral HS Risa Handley MD      senna-docusate sodium  2 tablet Oral BID PRN Risa Handley MD      sodium chloride  1 spray Each Nare Q1H PRN Risa Handley MD      [START ON 2/9/2024] sulfamethoxazole-trimethoprim  1 tablet Oral Once per day on Monday Wednesday Friday Ryanne Lerma MD       topiramate  100 mg Oral BID Risa Handley MD      venlafaxine  25 mg Oral TID With Meals Risa Handley MD          Today, Patient Was Seen By: Nash Calle DO    **Please Note: This note may have been constructed using a voice recognition system.**

## 2024-02-08 NOTE — UTILIZATION REVIEW
Continued Stay Review    Date: 2/8/2024                        Current Patient Class: inpatient  Current Level of Care: med/surg  HPI:59 y.o. female initially admitted on 1/10 with acute hypoxic respiratory failure 2/2 severe Covid     Assessment/Plan: Pt on 15lpm midflow this am. States 02 was out of her nose earlier. Pt c/o burning in nose. Decreased to 10 then to 8.  Continue weaning as able. Lungs with sparse basilar rhonchi, no rales, no wheezing, respirations mildly labored. Observe off abx. F/u repeat sputum cxs.  Weaned from IV solumedrol to po taper. Continue supportive care. Encourage incentive spirometry, OOB/ambulate. SCD. Reg diet.    Vital Signs:   Date/Time Temp Pulse Resp BP MAP (mmHg) SpO2 FiO2 (%) Calculated FIO2 (%) - Nasal Cannula O2 Flow Rate (L/min) Nasal Cannula O2 Flow Rate (L/min) O2 Device O2 Interface Device Patient Position - Orthostatic VS   02/08/24 0812 -- -- -- -- -- 99 % 8 -- -- -- Mid flow nasal cannula -- --   02/08/24 0735 98.3 °F (36.8 °C) 101 23 Abnormal  115/69 -- 91 % -- -- 15 L/min  -- -- -- Lying   O2 Flow Rate (L/min): midflow at 02/08/24 0735   02/08/24 0500 -- 79 20 115/69 86 -- -- -- -- -- -- -- --   02/08/24 0300 -- -- -- -- -- 96 % -- -- 10 L/min -- Mid flow nasal cannula -- --   02/07/24 2130 98.6 °F (37 °C) 78 -- 123/62 86 -- -- -- -- -- -- -- --   02/07/24 2000 -- -- -- -- -- -- -- -- 10 L/min -- Mid flow nasal cannula -- --   02/07/24 1950 -- -- -- -- -- 95 % 10 -- -- -- Mid flow nasal cannula -- --   02/07/24 1637 98.8 °F (37.1 °C) 97 20 143/76 102 97 % -- 180 -- 40 L/min Mid flow nasal cannula -- Lying   02/07/24 1600 -- -- -- -- -- -- -- -- 10 L/min -- Mid flow nasal cannula -- --   02/07/24 1100 -- -- -- 120/67 -- -- -- -- -- -- -- -- Sitting   02/07/24 0743 -- -- -- -- -- 96 % -- -- 12 L/min -- Mid flow nasal cannula MFNC prongs --       Pertinent Labs/Diagnostic Results:     Results from last 7 days   Lab Units 02/08/24  0504 02/06/24  0454 02/04/24  0559  02/03/24  0446 02/02/24  0706   WBC Thousand/uL 8.40 5.07 5.92 8.31 6.75   HEMOGLOBIN g/dL 11.5 11.5 11.9 13.6 13.3   HEMATOCRIT % 35.8 36.3 36.6 41.9 41.9   PLATELETS Thousands/uL 169 162 195 240 247   BANDS PCT %  --  9*  --   --   --      Results from last 7 days   Lab Units 02/08/24  0504 02/06/24  0454 02/04/24  0559 02/03/24  0446 02/02/24  0706 02/01/24  1447   SODIUM mmol/L 143 143 140 137 140 136   POTASSIUM mmol/L 3.7 4.3 4.6 5.0 4.9 5.0   CHLORIDE mmol/L 107 112* 110* 109* 109* 105   CO2 mmol/L 30 23 21 18* 20* 21   ANION GAP mmol/L 6 8 9 10 11 10   BUN mg/dL 32* 29* 40* 47* 44* 53*   CREATININE mg/dL 1.08 0.99 1.12 1.35* 1.26 1.45*   EGFR ml/min/1.73sq m 56 63 54 43 47 40   CALCIUM mg/dL 9.4 9.5 9.8 10.1 10.0 10.3*   MAGNESIUM mg/dL  --  1.9 1.9 2.0 2.0 2.1   PHOSPHORUS mg/dL  --  3.7 3.2 3.5 4.0 4.5     Results from last 7 days   Lab Units 02/08/24  1104 02/08/24  0612 02/07/24  2135 02/07/24  1651 02/07/24  1108 02/07/24  0609 02/06/24  2049 02/06/24  1821 02/06/24  1750 02/06/24  1224 02/06/24  0744 02/05/24  2147   POC GLUCOSE mg/dl 163* 101 215* 236* 141* 155* 281* 330* 328* 153* 141* 266*     Results from last 7 days   Lab Units 02/08/24  0504 02/06/24  0454 02/04/24  0559 02/03/24  0446 02/02/24  0706 02/01/24  1447   GLUCOSE RANDOM mg/dL 122 158* 72 93 81 137     Results from last 7 days   Lab Units 02/02/24  0941 02/02/24  0940 02/02/24  0938   GRAM STAIN RESULT  1+ Polys  No bacteria seen No Polys or Bacteria seen Rare Polys  No organisms seen     Results from last 7 days   Lab Units 02/02/24  0940 02/02/24  0939   TOTAL COUNTED  100 100       Medications:   Scheduled Medications:  enoxaparin, 40 mg, Subcutaneous, Daily  insulin lispro, 1-5 Units, Subcutaneous, 4x Daily (AC & HS)  lamoTRIgine, 150 mg, Oral, BID  melatonin, 6 mg, Oral, HS  nystatin, 500,000 Units, Swish & Spit, 4x Daily  polyethylene glycol, 17 g, Oral, Daily  predniSONE, 80 mg, Oral, Daily  QUEtiapine, 12.5 mg, Oral,  HS  topiramate, 100 mg, Oral, BID  venlafaxine, 25 mg, Oral, TID With Meals       PRN Meds:  acetaminophen, 650 mg, Oral, Q6H PRN 2/8 x1  bisacodyl, 10 mg, Rectal, Daily PRN  calcium carbonate, 500 mg, Oral, BID PRN  metoprolol, 2.5 mg, Intravenous, Q6H PRN  ondansetron, 4 mg, Intravenous, Q6H PRN  senna-docusate sodium, 2 tablet, Oral, BID PRN 2/8 x1  sodium chloride, 1 spray, Each Nare, Q1H PRN        Discharge Plan: TBD    Network Utilization Review Department  ATTENTION: Please call with any questions or concerns to [phone] and carefully listen to the prompts so that you are directed to the right person. All voicemails are confidential.   For Discharge needs, contact Care Management DC Support Team at [phone] opt. [phone]  Send all requests for admission clinical reviews, approved or denied determinations and any other requests to dedicated fax number below belonging to the [place] where the patient is receiving treatment. List of dedicated fax numbers for the Facilities:  FACILITY NAME UR FAX NUMBER   ADMISSION DENIALS (Administrative/Medical Necessity) [phone]   DISCHARGE SUPPORT TEAM (NETWORK) [phone]   PARENT CHILD HEALTH (Maternity/NICU/Pediatrics) [phone]   [facility] [phone]   [facility] [phone]   [facility] [phone]   [facility] [phone]   [facility] [phone]   [facility] [phone]   [facility] [phone]   [facility] [phone]   [facility] [phone]   [facility] [phone]   [facility] [phone]   [facility] [phone]

## 2024-02-08 NOTE — PHYSICAL THERAPY NOTE
Physical Therapy Progress Note     02/08/24 1050   PT Last Visit   PT Visit Date 02/08/24   Note Type   Note Type Treatment   Pain Assessment   Pain Assessment Tool 0-10   Pain Score No Pain   Restrictions/Precautions   Other Precautions Chair Alarm;Bed Alarm;O2;Fall Risk  (Pt. ambulated on 15L O2 midflow. Blood pressure 93/55 mmHg post session.)   Subjective   Subjective The patient notes that she feels a little weaker this morning.   Bed Mobility   Supine to Sit 6  Modified independent   Additional items Verbal cues   Transfers   Sit to Stand 5  Supervision   Additional items Verbal cues   Stand to Sit 5  Supervision   Additional items Verbal cues   Ambulation/Elevation   Gait pattern Excessively slow;Narrow HEATHER   Gait Assistance 5  Supervision   Additional items Verbal cues   Assistive Device Rolling walker   Distance 60 feet, 100 feet, 80 feet.   Balance   Static Sitting Good   Dynamic Sitting Fair +   Static Standing Fair +   Dynamic Standing Fair   Ambulatory Fair -   Activity Tolerance   Activity Tolerance Patient tolerated treatment well   Nurse Made Aware Yes.   Assessment   Prognosis Good   Problem List Decreased mobility;Decreased endurance   Assessment The patient did have some hypotension this morning, but she again was able to ambulate in the hallways. She did not drop as low as yesterday as she remained at the lowest 78% but primarily in the 80% range during gait on 15L. She was again educated on energy conservation techniques and pursed-lip breathing. The patient recovered within 90 seconds with each trial today which is also an improvement from yesterday.   Barriers to Discharge None   Goals   Patient Goals To continue to get better.   STG Expiration Date 02/13/24   PT Treatment Day 8   Plan   Treatment/Interventions Functional transfer training;LE strengthening/ROM;Elevations;Therapeutic exercise;Endurance training;Patient/family training;Bed mobility;Gait training   Progress Progressing toward  goals   PT Frequency 2-3x/wk   Discharge Recommendation   Rehab Resource Intensity Level, PT III (Minimum Resource Intensity)   Equipment Recommended Walker   Walker Package Recommended Wheeled walker   AM-PAC Basic Mobility Inpatient   Turning in Flat Bed Without Bedrails 4   Lying on Back to Sitting on Edge of Flat Bed Without Bedrails 4   Moving Bed to Chair 3   Standing Up From Chair Using Arms 3   Walk in Room 3   Climb 3-5 Stairs With Railing 3   Basic Mobility Inpatient Raw Score 20   Basic Mobility Standardized Score 43.99   Highest Level Of Mobility   JH-HLM Goal 6: Walk 10 steps or more   JH-HLM Achieved 7: Walk 25 feet or more         An AM-PAC Basic Mobility raw score less than 16 suggests the patient may benefit from discharge to post-acute rehab services.    Deon Ramos, PTA

## 2024-02-08 NOTE — ASSESSMENT & PLAN NOTE
BP 89/59 this afternoon  Received albumin 5% with improvement in BP to low 100s systolic  Continue to monitor  Holding diuretics for today

## 2024-02-09 ENCOUNTER — APPOINTMENT (INPATIENT)
Dept: RADIOLOGY | Facility: HOSPITAL | Age: 59
DRG: 003 | End: 2024-02-09
Payer: COMMERCIAL

## 2024-02-09 LAB
ANION GAP SERPL CALCULATED.3IONS-SCNC: 10 MMOL/L
ANISOCYTOSIS BLD QL SMEAR: PRESENT
BASOPHILS # BLD MANUAL: 0 THOUSAND/UL (ref 0–0.1)
BASOPHILS NFR MAR MANUAL: 0 % (ref 0–1)
BNP SERPL-MCNC: 75 PG/ML (ref 0–100)
BUN SERPL-MCNC: 32 MG/DL (ref 5–25)
CALCIUM SERPL-MCNC: 9.7 MG/DL (ref 8.4–10.2)
CHLORIDE SERPL-SCNC: 111 MMOL/L (ref 96–108)
CO2 SERPL-SCNC: 26 MMOL/L (ref 21–32)
CREAT SERPL-MCNC: 1.04 MG/DL (ref 0.6–1.3)
CRP SERPL QL: 1 MG/L
EOSINOPHIL # BLD MANUAL: 0 THOUSAND/UL (ref 0–0.4)
EOSINOPHIL NFR BLD MANUAL: 0 % (ref 0–6)
ERYTHROCYTE [DISTWIDTH] IN BLOOD BY AUTOMATED COUNT: 18.1 % (ref 11.6–15.1)
GFR SERPL CREATININE-BSD FRML MDRD: 59 ML/MIN/1.73SQ M
GLUCOSE SERPL-MCNC: 100 MG/DL (ref 65–140)
GLUCOSE SERPL-MCNC: 100 MG/DL (ref 65–140)
GLUCOSE SERPL-MCNC: 144 MG/DL (ref 65–140)
GLUCOSE SERPL-MCNC: 181 MG/DL (ref 65–140)
GLUCOSE SERPL-MCNC: 236 MG/DL (ref 65–140)
HCT VFR BLD AUTO: 36 % (ref 34.8–46.1)
HGB BLD-MCNC: 11.4 G/DL (ref 11.5–15.4)
LYMPHOCYTES # BLD AUTO: 0 % (ref 14–44)
LYMPHOCYTES # BLD AUTO: 0 THOUSAND/UL (ref 0.6–4.47)
MCH RBC QN AUTO: 30.2 PG (ref 26.8–34.3)
MCHC RBC AUTO-ENTMCNC: 31.7 G/DL (ref 31.4–37.4)
MCV RBC AUTO: 95 FL (ref 82–98)
MONOCYTES # BLD AUTO: 0.27 THOUSAND/UL (ref 0–1.22)
MONOCYTES NFR BLD: 4 % (ref 4–12)
MYELOCYTES NFR BLD MANUAL: 1 % (ref 0–1)
NEUTROPHILS # BLD MANUAL: 6.3 THOUSAND/UL (ref 1.85–7.62)
NEUTS BAND NFR BLD MANUAL: 8 % (ref 0–8)
NEUTS SEG NFR BLD AUTO: 87 % (ref 43–75)
NRBC BLD AUTO-RTO: 1 /100 WBC (ref 0–2)
OVALOCYTES BLD QL SMEAR: PRESENT
PLATELET # BLD AUTO: 157 THOUSANDS/UL (ref 149–390)
PLATELET BLD QL SMEAR: ADEQUATE
PMV BLD AUTO: 11.8 FL (ref 8.9–12.7)
POIKILOCYTOSIS BLD QL SMEAR: PRESENT
POTASSIUM SERPL-SCNC: 3.7 MMOL/L (ref 3.5–5.3)
PROCALCITONIN SERPL-MCNC: 0.06 NG/ML
RBC # BLD AUTO: 3.78 MILLION/UL (ref 3.81–5.12)
RBC MORPH BLD: PRESENT
SODIUM SERPL-SCNC: 147 MMOL/L (ref 135–147)
WBC # BLD AUTO: 6.63 THOUSAND/UL (ref 4.31–10.16)

## 2024-02-09 PROCEDURE — 99232 SBSQ HOSP IP/OBS MODERATE 35: CPT | Performed by: INTERNAL MEDICINE

## 2024-02-09 PROCEDURE — 85007 BL SMEAR W/DIFF WBC COUNT: CPT | Performed by: STUDENT IN AN ORGANIZED HEALTH CARE EDUCATION/TRAINING PROGRAM

## 2024-02-09 PROCEDURE — 85027 COMPLETE CBC AUTOMATED: CPT | Performed by: STUDENT IN AN ORGANIZED HEALTH CARE EDUCATION/TRAINING PROGRAM

## 2024-02-09 PROCEDURE — 80048 BASIC METABOLIC PNL TOTAL CA: CPT | Performed by: STUDENT IN AN ORGANIZED HEALTH CARE EDUCATION/TRAINING PROGRAM

## 2024-02-09 PROCEDURE — 99232 SBSQ HOSP IP/OBS MODERATE 35: CPT | Performed by: STUDENT IN AN ORGANIZED HEALTH CARE EDUCATION/TRAINING PROGRAM

## 2024-02-09 PROCEDURE — 87181 SC STD AGAR DILUTION PER AGT: CPT | Performed by: STUDENT IN AN ORGANIZED HEALTH CARE EDUCATION/TRAINING PROGRAM

## 2024-02-09 PROCEDURE — 94760 N-INVAS EAR/PLS OXIMETRY 1: CPT

## 2024-02-09 PROCEDURE — 87186 SC STD MICRODIL/AGAR DIL: CPT | Performed by: STUDENT IN AN ORGANIZED HEALTH CARE EDUCATION/TRAINING PROGRAM

## 2024-02-09 PROCEDURE — G1004 CDSM NDSC: HCPCS

## 2024-02-09 PROCEDURE — 71045 X-RAY EXAM CHEST 1 VIEW: CPT

## 2024-02-09 PROCEDURE — 87205 SMEAR GRAM STAIN: CPT | Performed by: STUDENT IN AN ORGANIZED HEALTH CARE EDUCATION/TRAINING PROGRAM

## 2024-02-09 PROCEDURE — 82948 REAGENT STRIP/BLOOD GLUCOSE: CPT

## 2024-02-09 PROCEDURE — 83880 ASSAY OF NATRIURETIC PEPTIDE: CPT | Performed by: STUDENT IN AN ORGANIZED HEALTH CARE EDUCATION/TRAINING PROGRAM

## 2024-02-09 PROCEDURE — 71275 CT ANGIOGRAPHY CHEST: CPT

## 2024-02-09 PROCEDURE — 97530 THERAPEUTIC ACTIVITIES: CPT

## 2024-02-09 PROCEDURE — 87070 CULTURE OTHR SPECIMN AEROBIC: CPT | Performed by: STUDENT IN AN ORGANIZED HEALTH CARE EDUCATION/TRAINING PROGRAM

## 2024-02-09 PROCEDURE — 97116 GAIT TRAINING THERAPY: CPT

## 2024-02-09 PROCEDURE — 87077 CULTURE AEROBIC IDENTIFY: CPT | Performed by: STUDENT IN AN ORGANIZED HEALTH CARE EDUCATION/TRAINING PROGRAM

## 2024-02-09 RX ORDER — FUROSEMIDE 10 MG/ML
20 INJECTION INTRAMUSCULAR; INTRAVENOUS ONCE
Status: DISCONTINUED | OUTPATIENT
Start: 2024-02-09 | End: 2024-02-09

## 2024-02-09 RX ORDER — INSULIN LISPRO 100 [IU]/ML
2 INJECTION, SOLUTION INTRAVENOUS; SUBCUTANEOUS
Status: DISCONTINUED | OUTPATIENT
Start: 2024-02-09 | End: 2024-02-14

## 2024-02-09 RX ORDER — FUROSEMIDE 10 MG/ML
40 INJECTION INTRAMUSCULAR; INTRAVENOUS ONCE
Status: COMPLETED | OUTPATIENT
Start: 2024-02-09 | End: 2024-02-09

## 2024-02-09 RX ORDER — METHYLPREDNISOLONE SODIUM SUCCINATE 125 MG/2ML
80 INJECTION, POWDER, LYOPHILIZED, FOR SOLUTION INTRAMUSCULAR; INTRAVENOUS ONCE
Status: COMPLETED | OUTPATIENT
Start: 2024-02-09 | End: 2024-02-09

## 2024-02-09 RX ADMIN — INSULIN LISPRO 2 UNITS: 100 INJECTION, SOLUTION INTRAVENOUS; SUBCUTANEOUS at 16:46

## 2024-02-09 RX ADMIN — TOPIRAMATE 100 MG: 100 TABLET, FILM COATED ORAL at 16:43

## 2024-02-09 RX ADMIN — INSULIN LISPRO 2 UNITS: 100 INJECTION, SOLUTION INTRAVENOUS; SUBCUTANEOUS at 12:03

## 2024-02-09 RX ADMIN — METHYLPREDNISOLONE SODIUM SUCCINATE 80 MG: 125 INJECTION, POWDER, FOR SOLUTION INTRAMUSCULAR; INTRAVENOUS at 21:55

## 2024-02-09 RX ADMIN — MELATONIN 6 MG: at 21:55

## 2024-02-09 RX ADMIN — PREDNISONE 80 MG: 20 TABLET ORAL at 09:31

## 2024-02-09 RX ADMIN — QUETIAPINE FUMARATE 12.5 MG: 25 TABLET ORAL at 21:55

## 2024-02-09 RX ADMIN — FUROSEMIDE 40 MG: 10 INJECTION, SOLUTION INTRAMUSCULAR; INTRAVENOUS at 21:55

## 2024-02-09 RX ADMIN — INSULIN LISPRO 2 UNITS: 100 INJECTION, SOLUTION INTRAVENOUS; SUBCUTANEOUS at 16:47

## 2024-02-09 RX ADMIN — LAMOTRIGINE 150 MG: 100 TABLET ORAL at 21:55

## 2024-02-09 RX ADMIN — IOHEXOL 85 ML: 350 INJECTION, SOLUTION INTRAVENOUS at 11:22

## 2024-02-09 RX ADMIN — TOPIRAMATE 100 MG: 100 TABLET, FILM COATED ORAL at 09:31

## 2024-02-09 RX ADMIN — VENLAFAXINE 25 MG: 25 TABLET ORAL at 16:42

## 2024-02-09 RX ADMIN — SULFAMETHOXAZOLE AND TRIMETHOPRIM 1 TABLET: 800; 160 TABLET ORAL at 09:32

## 2024-02-09 RX ADMIN — NYSTATIN 500000 UNITS: 100000 SUSPENSION ORAL at 16:42

## 2024-02-09 RX ADMIN — NYSTATIN 500000 UNITS: 100000 SUSPENSION ORAL at 05:02

## 2024-02-09 RX ADMIN — NYSTATIN 500000 UNITS: 100000 SUSPENSION ORAL at 09:31

## 2024-02-09 RX ADMIN — VENLAFAXINE 25 MG: 25 TABLET ORAL at 09:36

## 2024-02-09 RX ADMIN — ENOXAPARIN SODIUM 40 MG: 40 INJECTION SUBCUTANEOUS at 09:31

## 2024-02-09 RX ADMIN — VENLAFAXINE 25 MG: 25 TABLET ORAL at 12:03

## 2024-02-09 RX ADMIN — INSULIN LISPRO 1 UNITS: 100 INJECTION, SOLUTION INTRAVENOUS; SUBCUTANEOUS at 21:55

## 2024-02-09 RX ADMIN — LAMOTRIGINE 150 MG: 100 TABLET ORAL at 09:31

## 2024-02-09 NOTE — PLAN OF CARE
Problem: PHYSICAL THERAPY ADULT  Goal: Performs mobility at highest level of function for planned discharge setting.  See evaluation for individualized goals.  Description:    Equipment Recommended:  (none)       See flowsheet documentation for full assessment, interventions and recommendations.  Outcome: Progressing  Note: Prognosis: Good  Problem List: Decreased mobility, Decreased endurance  Assessment: The patient continues to mobilize well, but she remains limited due to her pulmonary status. She ambulated to and from the bathroom, but she was tachycardic with heart rate up to 155 bpm and SPO2 in the low 70%'s. With a seven minute rest this improved to heart rate of 110 and SPO2 of 92% while on 15L. She demonstrated improved pacing as well as pursed-lip breathing, but deferred further ambulation today due to her increased respiratory requirements and heart rate.  Barriers to Discharge: None     Rehab Resource Intensity Level, PT: III (Minimum Resource Intensity)    See flowsheet documentation for full assessment.

## 2024-02-09 NOTE — ASSESSMENT & PLAN NOTE
Lab Results   Component Value Date    EGFR 59 02/09/2024    EGFR 56 02/08/2024    EGFR 63 02/06/2024    CREATININE 1.04 02/09/2024    CREATININE 1.08 02/08/2024    CREATININE 0.99 02/06/2024   Cr at baseline   Avoid nephrotoxins

## 2024-02-09 NOTE — PHYSICAL THERAPY NOTE
Physical Therapy Progress Note     02/09/24 1500   PT Last Visit   PT Visit Date 02/09/24   Note Type   Note Type Treatment   Pain Assessment   Pain Assessment Tool 0-10   Pain Score No Pain   Restrictions/Precautions   Other Precautions Chair Alarm;O2;Fall Risk;Telemetry;Multiple lines  (Alarm active post session.)   Subjective   Subjective The patient is eager to get moving.   Bed Mobility   Supine to Sit 6  Modified independent   Transfers   Sit to Stand 5  Supervision   Stand to Sit 5  Supervision   Ambulation/Elevation   Gait pattern Excessively slow;Narrow HEATHER;Decreased foot clearance   Gait Assistance 5  Supervision   Additional items Verbal cues   Assistive Device Rolling walker   Distance 80 feet x 2 with extended seated rest in-between.   Balance   Static Sitting Good   Dynamic Sitting Fair +   Static Standing Fair +   Dynamic Standing Fair   Ambulatory Fair -   Activity Tolerance   Activity Tolerance Patient tolerated treatment well;Treatment limited secondary to medical complications (Comment)  (SPO2 dropping to high 60's / low 70's as well as heart rate into the 140's. Improved with extended seated rests and pursed-lip breathing on 15L O2 midflow.)   Nurse Made Aware Yes.   Assessment   Prognosis Good   Problem List Decreased mobility;Decreased endurance   Assessment The patient continues to mobilize well, but she remains limited due to her pulmonary status. She ambulated to and from the bathroom, but she was tachycardic with heart rate up to 155 bpm and SPO2 in the low 70%'s. With a seven minute rest this improved to heart rate of 110 and SPO2 of 92% while on 15L. She demonstrated improved pacing as well as pursed-lip breathing, but deferred further ambulation today due to her increased respiratory requirements and heart rate.   Barriers to Discharge None   Goals   Patient Goals To get back home Monday.   STG Expiration Date 02/13/24   PT Treatment Day 9   Plan   Treatment/Interventions Functional  transfer training;Elevations;Endurance training;Patient/family training;Bed mobility;Gait training   Progress Progressing toward goals   PT Frequency 2-3x/wk   Discharge Recommendation   Rehab Resource Intensity Level, PT III (Minimum Resource Intensity)   Equipment Recommended Walker   Walker Package Recommended Wheeled walker   AM-PAC Basic Mobility Inpatient   Turning in Flat Bed Without Bedrails 4   Lying on Back to Sitting on Edge of Flat Bed Without Bedrails 4   Moving Bed to Chair 3   Standing Up From Chair Using Arms 3   Walk in Room 3   Climb 3-5 Stairs With Railing 3   Basic Mobility Inpatient Raw Score 20   Basic Mobility Standardized Score 43.99   Highest Level Of Mobility   JH-HLM Goal 6: Walk 10 steps or more   JH-HLM Achieved 7: Walk 25 feet or more         An AM-PAC Basic Mobility raw score less than 16 suggests the patient may benefit from discharge to post-acute rehab services.    Deon Ramos, PTA

## 2024-02-09 NOTE — PROGRESS NOTES
[facility]  Progress Note  Name: Carla Hunt I  MRN: [id number]  Unit/Bed#: [facility] 722-01 I Date of Admission: 1/10/2024   Date of Service: 2/9/2024 I Hospital Day: 30    Assessment/Plan   * Acute respiratory failure with hypoxia (HCC)  Assessment & Plan  Unclear etiology although likely secondary to severe COVID-19 pneumonia in the setting of immunocompromise state or potentially organizing pneumonia  S/p IV Solu-Medrol 1000 mg x 3 days, now on oral prednisone 80 mg daily with taper by 10 mg weekly until complete  Diuresis per pulmonology, appreciate recommendations  ID following, appreciate recommendations  Currently monitoring off antibiotics, follow-up repeat sputum cultures  Currently on 15 L mid flow with tachycardia  CTA chest negative for PE  Continue PJP prophylaxis  Strict I/O    COVID  Assessment & Plan  CT chest shows extensive fibrosis and groundglass opacity, bronchiectasis consistent with COVID-19 and possible underlying infectious process or fibrosis in the setting of Rituxan.  S/p Actemra, remdesivir  S/p high-dose IV Solu-Medrol x3 days, now on oral prednisone 80 mg daily (2/8) with taper 10 mg weekly  Pulmonology following, appreciate recommendations, will need outpatient follow-up      Hypotension  Assessment & Plan  Blood pressure stable  Continue to monitor    Pneumonia of both lungs due to infectious organism  Assessment & Plan  Sputum culture with stenotrophomonas  S/p 5-day course of Bactrim/minocycline  Follow-up repeat sputum culture ordered by pulmonology    Seizure disorder (HCC)  Assessment & Plan  S/p Temporal lobectomy in 2007  Continue home Lamictal and Topamax    Teto marginal zone B-cell lymphoma (HCC)  Assessment & Plan  Last dose of Rituxan in December, known to cause lung fibrosis  Recommend close follow-up with oncology outpatient for discussion of continuing Rituxan moving forward    Stage 3b chronic kidney disease (CKD)  ([facility])  Assessment & Plan  Lab Results   Component Value Date    EGFR 59 02/09/2024    EGFR 56 02/08/2024    EGFR 63 02/06/2024    CREATININE 1.04 02/09/2024    CREATININE 1.08 02/08/2024    CREATININE 0.99 02/06/2024   Cr at baseline   Avoid nephrotoxins    Anxiety state  Assessment & Plan  Continue home effexor     Dysphagia-resolved as of 1/25/2024  Assessment & Plan  Dysphagia noted on admission, resolved.  Evaluation by speech and okay for regular diet    Encephalopathy acute-resolved as of 1/25/2024  Assessment & Plan  Initial concern of viral encephalitis  Received IV Aciclovir, discontinued after 2 negative lumbar punctures  Body fluid cytology negative for malignancy, leukemia/lymphoma flow cytometry negative   Continue Seroquel and melatonin  Resolved             VTE Pharmacologic Prophylaxis: VTE Score: 11 High Risk (Score >/= 5) - Pharmacological DVT Prophylaxis Ordered: enoxaparin (Lovenox). Sequential Compression Devices Ordered.    Mobility:   Basic Mobility Inpatient Raw Score: 20  JH-HLM Goal: 6: Walk 10 steps or more  JH-HLM Achieved: 7: Walk 25 feet or more  HLM Goal achieved. Continue to encourage appropriate mobility.    Patient Centered Rounds: I performed bedside rounds with nursing staff today.   Discussions with Specialists or Other Care Team Provider: primary RN, pulm    Education and Discussions with Family / Patient: Updated  () at bedside.    Total Time Spent on Date of Encounter in care of patient: 25 mins. This time was spent on one or more of the following: performing physical exam; counseling and coordination of care; obtaining or reviewing history; documenting in the medical record; reviewing/ordering tests, medications or procedures; communicating with other healthcare professionals and discussing with patient's family/caregivers.    Current Length of Stay: 30 day(s)  Current Patient Status: Inpatient   Certification Statement: The patient will continue to  require additional inpatient hospital stay due to acute respiratory failure requiring mid flow  Discharge Plan: Anticipate discharge in >72 hrs to rehab facility.    Code Status: Level 1 - Full Code    Subjective:   Patient assessed at bedside.  Complains of some shortness of breath.  Tachycardic today.    Objective:     Vitals:   Temp (24hrs), Av.7 °F (37.1 °C), Min:97.8 °F (36.6 °C), Max:100 °F (37.8 °C)    Temp:  [97.8 °F (36.6 °C)-100 °F (37.8 °C)] 98.2 °F (36.8 °C)  HR:  [] 107  Resp:  [20-32] 20  BP: (109-138)/(59-67) 109/65  SpO2:  [87 %-98 %] 98 %  Body mass index is 24.67 kg/m².     Input and Output Summary (last 24 hours):     Intake/Output Summary (Last 24 hours) at 2024 1634  Last data filed at 2024 1301  Gross per 24 hour   Intake 960 ml   Output 700 ml   Net 260 ml       Physical Exam:   Physical Exam  Vitals reviewed.   Constitutional:       General: She is not in acute distress.     Appearance: Normal appearance. She is not ill-appearing.   HENT:      Head: Normocephalic and atraumatic.   Cardiovascular:      Rate and Rhythm: Regular rhythm. Tachycardia present.      Heart sounds: Normal heart sounds.   Pulmonary:      Effort: Pulmonary effort is normal.      Comments: Crackles in the bases  15 L mid flow  Abdominal:      General: Bowel sounds are normal.      Tenderness: There is no abdominal tenderness.   Musculoskeletal:      Right lower leg: No edema.      Left lower leg: No edema.   Skin:     General: Skin is warm and dry.   Neurological:      Mental Status: She is alert and oriented to person, place, and time. Mental status is at baseline.   Psychiatric:         Mood and Affect: Mood normal.         Behavior: Behavior normal.        Additional Data:     Labs:  Results from last 7 days   Lab Units 24  0832   WBC Thousand/uL 6.63   HEMOGLOBIN g/dL 11.4*   HEMATOCRIT % 36.0   PLATELETS Thousands/uL 157   BANDS PCT % 8   LYMPHO PCT % 0*   MONO PCT % 4   EOS PCT % 0      Results from last 7 days   Lab Units 02/09/24  0628   SODIUM mmol/L 147   POTASSIUM mmol/L 3.7   CHLORIDE mmol/L 111*   CO2 mmol/L 26   BUN mg/dL 32*   CREATININE mg/dL 1.04   ANION GAP mmol/L 10   CALCIUM mg/dL 9.7   GLUCOSE RANDOM mg/dL 100         Results from last 7 days   Lab Units 02/09/24  1619 02/09/24  1105 02/09/24  0603 02/08/24  2048 02/08/24  1613 02/08/24  1104 02/08/24  0612 02/07/24  2135 02/07/24  1651 02/07/24  1108 02/07/24  0609 02/06/24  2049   POC GLUCOSE mg/dl 236* 144* 100 252* 213* 163* 101 215* 236* 141* 155* 281*         Results from last 7 days   Lab Units 02/08/24  0504   PROCALCITONIN ng/ml 0.06       Lines/Drains:  Invasive Devices       Peripheral Intravenous Line  Duration             Peripheral IV 02/06/24 Distal;Right;Ventral (anterior) Forearm 3 days    Peripheral IV 02/09/24 Left;Upper;Ventral (anterior) Arm <1 day                          Imaging: Reviewed radiology reports from this admission including: chest CT scan    Recent Cultures (last 7 days):         Last 24 Hours Medication List:   Current Facility-Administered Medications   Medication Dose Route Frequency Provider Last Rate    acetaminophen  650 mg Oral Q6H PRN Risa Handley MD      bisacodyl  10 mg Rectal Daily PRN Risa Handley MD      calcium carbonate  500 mg Oral BID PRN Risa Handley MD      enoxaparin  40 mg Subcutaneous Daily Risa Handley MD      insulin lispro  1-5 Units Subcutaneous 4x Daily (AC & HS) Risa Handley MD      insulin lispro  2 Units Subcutaneous TID With Meals Nash Calle DO      lamoTRIgine  150 mg Oral BID Risa Handley MD      melatonin  6 mg Oral HS Risa Handley MD      metoprolol  2.5 mg Intravenous Q6H PRN Risa Handley MD      nystatin  500,000 Units Swish & Spit 4x Daily Risa Handley MD      ondansetron  4 mg Intravenous Q6H PRN Risa Handlye MD      polyethylene glycol  17 g Oral Daily Risa Handley MD      predniSONE  80 mg Oral Daily Risa Handley MD      QUEtiapine   12.5 mg Oral HS Risa Handley MD      senna-docusate sodium  2 tablet Oral BID PRN Risa Handley MD      sodium chloride  1 spray Each Nare Q1H PRN Risa Handley MD      sulfamethoxazole-trimethoprim  1 tablet Oral Once per day on Monday Wednesday Friday Ryanne Lerma MD      topiramate  100 mg Oral BID Risa Handley MD      venlafaxine  25 mg Oral TID With Meals Risa Handley MD          Today, Patient Was Seen By: Nash Calle DO    **Please Note: This note may have been constructed using a voice recognition system.**

## 2024-02-09 NOTE — PROGRESS NOTES
PULMONOLOGY FOLLOW UP NOTE     Name: Carla Hunt   Age & Sex: 59 y.o. female   MRN: [id number]  Unit/Bed#: [facility] 722-01   Encounter: [id number]    Assessment:   Acute hypoxic respiratory failure   COVID with possible COVID pneumonitis   Stenotrophomonas pneumonia   Stage IV B cell lymphoma on Rituxan   Epilepsy   CKD stage 3     Plan:  Acute hypoxic respiratory failure   Over the last 48 hours patient has worsened with escalating O2 requirements, now requiring 15L MFNC. She continues to desaturate after minimal effort. Will need further evaluation with CT PE. May be PE given her hypotensive episode and tachycardia to the 120s. May be infectious given the fact that she was on pulse dose steroids. Based on CT results may need repeat bronchoscopy Monday if continues to not improve.   - Will need to further wean supplemental O2 as tolerated. Patient will most likely require O2 supplementation at baseline due to extensive fibrotic disease of the lungs     COVID with possible pneumonia vs pneumonitis   Previous CT Chest showed extensive ground glass opacities, bronchiectasis consistent with COVID and possible underlying infectious process or fibrosis in the setting of rituxan. Etiology at this time is unclear.   - CT PE ordered STAT today due to worsening O2 requirements   - Continuing prednisone 80mg daily.   - Cont on PJP ppx (bactrim)  - Strict I/Os  - May need additional diuretics today based on CT chest results     Stenotrophomonas pneumonia  Bronchial culture on 2/1 showed stenotrophomonas growing.  Now status post minocycline and Bactrim x 5 days.   -Sputum culture ordered     Stage IV B cell lymphoma on Rituxan   Hx of B cell lymphoma with last dose of rituxan in December. Rituxan is known to cause lung fibrosis. CT Chest shows evidence of fibrosis that may have been present before admission. Unclear etiology of lung disease at this time, but appears multifactorial with drug induced fibrosis, covid  induced inflammation and possible bacterial infection. Bronchoscopy showed no evidence of malignancy on cytology at this time.   - Will continue to hold off rituxan given inpatient admission at this time       Subjective:   Yesterday patient desaturated with minimal movement to the 70s and required NRB for a small amount of time. She also had a hypotensive episode yesterday requiring albumin. Today she had a desaturation episode again after minimal movement. She states she feels fine and does not feel short of breath but saturates in the 70s. Does not endorse much of a cough. Denies fever/chills/chest pain.    Review of systems:  12 point review of systems was completed and was otherwise negative except as listed in HPI.      Historical Information   Past Medical History:   Diagnosis Date    Hx of hypercalcemia     Lymphoma (HCC) 2021    Parathyroid disease (HCC)     Seizures (HCC)     Situational depression     24 yr old son  from drug overdose     Past Surgical History:   Procedure Laterality Date    CRANIOTOMY FOR TEMPORAL LOBECTOMY Left      Family History   Problem Relation Age of Onset    Diabetes Mother     Cancer Father        Social History    Social History:   Social History     Socioeconomic History    Marital status: /Civil Union     Spouse name: Not on file    Number of children: Not on file    Years of education: Not on file    Highest education level: Not on file   Occupational History    Not on file   Tobacco Use    Smoking status: Never    Smokeless tobacco: Never   Vaping Use    Vaping status: Never Used   Substance and Sexual Activity    Alcohol use: No    Drug use: Never    Sexual activity: Not on file   Other Topics Concern    Not on file   Social History Narrative    Not on file     Social Determinants of Health     Financial Resource Strain: Not on file   Food Insecurity: No Food Insecurity (2024)    Hunger Vital Sign     Worried About Running Out of Food in the Last  Year: Never true     Ran Out of Food in the Last Year: Never true   Transportation Needs: No Transportation Needs (1/11/2024)    PRAPARE - Transportation     Lack of Transportation (Medical): No     Lack of Transportation (Non-Medical): No   Physical Activity: Not on file   Stress: Not on file   Social Connections: Not on file   Intimate Partner Violence: Not on file   Housing Stability: Low Risk  (1/11/2024)    Housing Stability Vital Sign     Unable to Pay for Housing in the Last Year: No     Number of Places Lived in the Last Year: 1     Unstable Housing in the Last Year: No       Occupational History: n/a    Meds/Allergies   Current Facility-Administered Medications   Medication Dose Route Frequency    acetaminophen (TYLENOL) tablet 650 mg  650 mg Oral Q6H PRN    bisacodyl (DULCOLAX) rectal suppository 10 mg  10 mg Rectal Daily PRN    calcium carbonate (TUMS) chewable tablet 500 mg  500 mg Oral BID PRN    enoxaparin (LOVENOX) subcutaneous injection 40 mg  40 mg Subcutaneous Daily    insulin lispro (HumaLOG) 100 units/mL subcutaneous injection 1-5 Units  1-5 Units Subcutaneous 4x Daily (AC & HS)    insulin lispro (HumaLOG) 100 units/mL subcutaneous injection 2 Units  2 Units Subcutaneous TID With Meals    lamoTRIgine (LaMICtal) tablet 150 mg  150 mg Oral BID    melatonin tablet 6 mg  6 mg Oral HS    metoprolol (LOPRESSOR) injection 2.5 mg  2.5 mg Intravenous Q6H PRN    nystatin (MYCOSTATIN) oral suspension 500,000 Units  500,000 Units Swish & Spit 4x Daily    ondansetron (ZOFRAN) injection 4 mg  4 mg Intravenous Q6H PRN    polyethylene glycol (MIRALAX) packet 17 g  17 g Oral Daily    predniSONE tablet 80 mg  80 mg Oral Daily    QUEtiapine (SEROquel) tablet 12.5 mg  12.5 mg Oral HS    senna-docusate sodium (SENOKOT S) 8.6-50 mg per tablet 2 tablet  2 tablet Oral BID PRN    sodium chloride (OCEAN) 0.65 % nasal spray 1 spray  1 spray Each Nare Q1H PRN    sulfamethoxazole-trimethoprim (BACTRIM DS) 800-160 mg per  "tablet 1 tablet  1 tablet Oral Once per day on Monday Wednesday Friday    topiramate (TOPAMAX) tablet 100 mg  100 mg Oral BID    venlafaxine (EFFEXOR) tablet 25 mg  25 mg Oral TID With Meals     Medications Prior to Admission   Medication    busPIRone (BUSPAR) 5 mg tablet    escitalopram (LEXAPRO) 10 mg tablet    ibuprofen (MOTRIN) 600 mg tablet    lamoTRIgine (LaMICtal) 200 MG tablet    lamoTRIgine (LaMICtal) 200 MG tablet    medroxyPROGESTERone acetate (DEPO-PROVERA SYRINGE) 150 mg/mL injection    ondansetron (ZOFRAN-ODT) 4 mg disintegrating tablet    topiramate (TOPAMAX) 100 mg tablet    topiramate (TOPAMAX) 100 mg tablet    venlafaxine (EFFEXOR-XR) 75 mg 24 hr capsule     No Known Allergies    Objective    Vitals: Blood pressure 119/59, pulse 90, temperature 98.8 °F (37.1 °C), temperature source Oral, resp. rate 20, height 5' 8\" (1.727 m), weight 73.6 kg (162 lb 4.1 oz), SpO2 90%., Body mass index is 24.67 kg/m².      Intake/Output Summary (Last 24 hours) at 2/9/2024 1051  Last data filed at 2/9/2024 0637  Gross per 24 hour   Intake 960 ml   Output 700 ml   Net 260 ml       Physical Exam  Vitals and nursing note reviewed.   Constitutional:       General: She is not in acute distress.     Appearance: She is well-developed.   HENT:      Head: Normocephalic and atraumatic.   Eyes:      Conjunctiva/sclera: Conjunctivae normal.   Cardiovascular:      Rate and Rhythm: Normal rate and regular rhythm.      Heart sounds: No murmur heard.  Pulmonary:      Effort: Pulmonary effort is normal. No respiratory distress.      Comments: Bilateral rales present in the bases   Abdominal:      Palpations: Abdomen is soft.      Tenderness: There is no abdominal tenderness.   Musculoskeletal:         General: No swelling.      Cervical back: Neck supple.      Comments: Trade edema present bilaterally    Skin:     General: Skin is warm and dry.      Capillary Refill: Capillary refill takes less than 2 seconds.   Neurological:      " "Mental Status: She is alert.   Psychiatric:         Mood and Affect: Mood normal.         Labs: I have personally reviewed pertinent lab results.  Laboratory and Diagnostics  Results from last 7 days   Lab Units 02/09/24  0832 02/08/24  0504 02/06/24  0454 02/04/24  0559 02/03/24  0446   WBC Thousand/uL 6.63 8.40 5.07 5.92 8.31   HEMOGLOBIN g/dL 11.4* 11.5 11.5 11.9 13.6   HEMATOCRIT % 36.0 35.8 36.3 36.6 41.9   PLATELETS Thousands/uL 157 169 162 195 240   BANDS PCT % 8  --  9*  --   --    MONO PCT % 4  --  2*  --   --    EOS PCT % 0  --  0  --   --      Results from last 7 days   Lab Units 02/09/24  0628 02/08/24  0504 02/06/24  0454 02/04/24  0559 02/03/24  0446   SODIUM mmol/L 147 143 143 140 137   POTASSIUM mmol/L 3.7 3.7 4.3 4.6 5.0   CHLORIDE mmol/L 111* 107 112* 110* 109*   CO2 mmol/L 26 30 23 21 18*   ANION GAP mmol/L 10 6 8 9 10   BUN mg/dL 32* 32* 29* 40* 47*   CREATININE mg/dL 1.04 1.08 0.99 1.12 1.35*   CALCIUM mg/dL 9.7 9.4 9.5 9.8 10.1   GLUCOSE RANDOM mg/dL 100 122 158* 72 93     Results from last 7 days   Lab Units 02/06/24  0454 02/04/24  0559 02/03/24  0446   MAGNESIUM mg/dL 1.9 1.9 2.0   PHOSPHORUS mg/dL 3.7 3.2 3.5                   Results from last 7 days   Lab Units 02/08/24  0504   CRP mg/L 1.0                       ABG:       Micro:          Imaging and other studies: I have personally reviewed pertinent reports.      Pulmonary function testing:     EKG, Pathology, and Other Studies: I have personally reviewed pertinent reports.             Code Status: Level 1 - Full Code    VTE Pharmacologic Prophylaxis: Enoxaparin (Lovenox)  VTE Mechanical Prophylaxis: sequential compression device    Disclaimer: Portions of the record may have been created with voice recognition software. Occasional wrong word or \"sound a like\" substitutions may have occurred due to the inherent limitations of voice recognition software. Careful consideration should be taken to recognize, using context, where " substitutions have occurred.    Ryanne Lerma MD

## 2024-02-09 NOTE — PLAN OF CARE
Problem: Prexisting or High Potential for Compromised Skin Integrity  Goal: Skin integrity is maintained or improved  Description: INTERVENTIONS:  - Identify patients at risk for skin breakdown  - Assess and monitor skin integrity  - Assess and monitor nutrition and hydration status  - Monitor labs   - Assess for incontinence   - Turn and reposition patient  - Assist with mobility/ambulation  - Relieve pressure over bony prominences  - Avoid friction and shearing  - Provide appropriate hygiene as needed including keeping skin clean and dry  - Evaluate need for skin moisturizer/barrier cream  - Collaborate with interdisciplinary team   - Patient/family teaching  - Consider wound care consult   Outcome: Progressing     Problem: INFECTION - ADULT  Goal: Absence or prevention of progression during hospitalization  Description: INTERVENTIONS:  - Assess and monitor for signs and symptoms of infection  - Monitor lab/diagnostic results  - Monitor all insertion sites, i.e. indwelling lines, tubes, and drains  - Monitor endotracheal if appropriate and nasal secretions for changes in amount and color  - [place] appropriate cooling/warming therapies per order  - Administer medications as ordered  - Instruct and encourage patient and family to use good hand hygiene technique  - Identify and instruct in appropriate isolation precautions for identified infection/condition  Outcome: Progressing     Problem: SAFETY ADULT  Goal: Patient will remain free of falls  Description: INTERVENTIONS:  - Educate patient/family on patient safety including physical limitations  - Instruct patient to call for assistance with activity   - Consult OT/PT to assist with strengthening/mobility   - Keep Call bell within reach  - Keep bed low and locked with side rails adjusted as appropriate  - Keep care items and personal belongings within reach  - Initiate and maintain comfort rounds  - Make Fall Risk Sign visible to staff  - Offer Toileting every  2 Hours, in advance of need  - Initiate/Maintain bed alarm  - Obtain necessary fall risk management equipment: non skid footwear  - Apply yellow socks and bracelet for high fall risk patients  - Consider moving patient to room near nurses station  Outcome: Progressing     Problem: RESPIRATORY - ADULT  Goal: Achieves optimal ventilation and oxygenation  Description: INTERVENTIONS:  - Assess for changes in respiratory status  - Assess for changes in mentation and behavior  - Position to facilitate oxygenation and minimize respiratory effort  - Oxygen administered by appropriate delivery if ordered  - Initiate smoking cessation education as indicated  - Encourage broncho-pulmonary hygiene including cough, deep breathe, Incentive Spirometry  - Assess the need for suctioning and aspirate as needed  - Assess and instruct to report SOB or any respiratory difficulty  - Respiratory Therapy support as indicated  Outcome: Progressing     Problem: SKIN/TISSUE INTEGRITY - ADULT  Goal: Skin Integrity remains intact(Skin Breakdown Prevention)  Description: Assess:  -Perform Flavio assessment every shift   -Clean and moisturize skin every day   -Inspect skin when repositioning, toileting, and assisting with ADLS  -Assess under medical devices such as ronny  every hour   -Assess extremities for adequate circulation and sensation     Bed Management:  -Have minimal linens on bed & keep smooth, unwrinkled  -Change linens as needed when moist or perspiring  -Avoid sitting or lying in one position for more than 2 hours while in bed  -Keep HOB at 45 degrees     Toileting:  -Offer bedside commode  -Assess for incontinence every hour  -Use incontinent care products after each incontinent episode such as moisture barrier cream     Activity:  -Mobilize patient 3 times a day  -Encourage activity and walks on unit  -Encourage or provide ROM exercises   -Turn and reposition patient every 2 Hours  -Use appropriate equipment to lift or move  patient in bed  -Instruct/ Assist with weight shifting every hour when out of bed in chair  -Consider limitation of chair time 2 hour intervals    Skin Care:  -Avoid use of baby powder, tape, friction and shearing, hot water or constrictive clothing  -Relieve pressure over bony prominences using allevyn   -Do not massage red bony areas    Next Steps:  -Teach patient strategies to minimize risks such as weight shifting    -Consider consults to  interdisciplinary teams such as PT  Outcome: Progressing

## 2024-02-09 NOTE — UTILIZATION REVIEW
Continued Stay Review    Date: 2-9-24                           Current Patient Class: inpatient  Current Level of Care: med surg    HPI:59 y.o. female initially admitted on 1-10-24      Assessment/Plan:     Over 48 hours, patient has worsened with escalating O2 requirements, now requiring 15L Midflow NC.  She continues to desaturate with minimal effort.  Will need further evaluation with CT PE. Possibly PE given hypotension episode and tachycardia to 120s.  May be infectious given the fact that she was on pulse dose steroids.  Based on CT results may need repeat bronchoscopy Monday if continues to not improved.      Vital Signs:     Date/Time Temp Pulse Resp BP MAP (mmHg) SpO2 O2 Flow Rate (L/min) Nasal Cannula O2 Flow Rate (L/min) O2 Device   02/09/24 1100 100 °F (37.8 °C) 118   Abnormal  20 115/65 -- 93 % -- 15 L/min Mid flow nasal cannula   02/09/24 0729 -- -- -- -- -- 90 % -- 12 L/min Mid flow nasal cannula   02/09/24 0500 -- 90 -- 119/59 79 96 % -- -- --   02/08/24 2117 -- 99 20 138/67 93 91 % -- -- --   02/08/24 1532 -- -- -- -- -- 94 % -- 6 L/min Nasal cannula   02/08/24 1517 98.8 °F (37.1 °C) 94 18 108/76 86 97 % -- -- Mid flow nasal cannula   02/08/24 1352 -- -- -- 105/58 75 -- -- -- --   02/08/24 1342 -- -- -- -- -- 98 % 8 L/min -- Mid flow nasal cannula   02/08/24 1155 98.6 °F (37 °C) 106   Abnormal  20 89/59   Abnormal  70 95 % 10 L/min -- Mid flow nasal cannula   02/08/24 1112 98.6 °F (37 °C) 107   Abnormal  28   Abnormal  93/55 -- 93 % -- 15 L/min --   02/08/24 0812 -- -- -- -- -- 99 % -- -- Mid flow nasal cannula   02/08/24 0735 98.3 °F (36.8 °C) 101 23   Abnormal  115/69 -- 91 % 15 L/min  -- Mid flow nasal cannula   02/08/24 0500 -- 79 20 115/69 86 -- -- -- --   02/08/24 0300 -- -- -- -- -- 96 % 10 L/min -- Mid flow nasal cannula         Pertinent Labs/Diagnostic Results:       Results from last 7 days   Lab Units 02/09/24  0832 02/08/24  0504 02/06/24  0454 02/04/24  0559 02/03/24  0446   WBC  Thousand/uL 6.63 8.40 5.07 5.92 8.31   HEMOGLOBIN g/dL 11.4* 11.5 11.5 11.9 13.6   HEMATOCRIT % 36.0 35.8 36.3 36.6 41.9   PLATELETS Thousands/uL 157 169 162 195 240   BANDS PCT % 8  --  9*  --   --          Results from last 7 days   Lab Units 02/09/24  0628 02/08/24  0504 02/06/24  0454 02/04/24  0559 02/03/24  0446   SODIUM mmol/L 147 143 143 140 137   POTASSIUM mmol/L 3.7 3.7 4.3 4.6 5.0   CHLORIDE mmol/L 111* 107 112* 110* 109*   CO2 mmol/L 26 30 23 21 18*   ANION GAP mmol/L 10 6 8 9 10   BUN mg/dL 32* 32* 29* 40* 47*   CREATININE mg/dL 1.04 1.08 0.99 1.12 1.35*   EGFR ml/min/1.73sq m 59 56 63 54 43   CALCIUM mg/dL 9.7 9.4 9.5 9.8 10.1   MAGNESIUM mg/dL  --   --  1.9 1.9 2.0   PHOSPHORUS mg/dL  --   --  3.7 3.2 3.5         Results from last 7 days   Lab Units 02/09/24  0603 02/08/24  2048 02/08/24  1613 02/08/24  1104 02/08/24  0612 02/07/24  2135 02/07/24  1651 02/07/24  1108 02/07/24  0609 02/06/24  2049 02/06/24  1821 02/06/24  1750   POC GLUCOSE mg/dl 100 252* 213* 163* 101 215* 236* 141* 155* 281* 330* 328*     Results from last 7 days   Lab Units 02/09/24  0628 02/08/24  0504 02/06/24  0454 02/04/24  0559 02/03/24  0446   GLUCOSE RANDOM mg/dL 100 122 158* 72 93         Results from last 7 days   Lab Units 02/08/24  0504   CRP mg/L 1.0     Scheduled Medications:    enoxaparin, 40 mg, Subcutaneous, Daily  insulin lispro, 1-5 Units, Subcutaneous, 4x Daily (AC & HS)  insulin lispro, 2 Units, Subcutaneous, TID With Meals  lamoTRIgine, 150 mg, Oral, BID  melatonin, 6 mg, Oral, HS  nystatin, 500,000 Units, Swish & Spit, 4x Daily  polyethylene glycol, 17 g, Oral, Daily  predniSONE, 80 mg, Oral, Daily  QUEtiapine, 12.5 mg, Oral, HS  sulfamethoxazole-trimethoprim, 1 tablet, Oral, Once per day on Monday Wednesday Friday  topiramate, 100 mg, Oral, BID  venlafaxine, 25 mg, Oral, TID With Meals      Continuous IV Infusions:     PRN Meds:  acetaminophen, 650 mg, Oral, Q6H PRN  bisacodyl, 10 mg, Rectal, Daily  PRN  calcium carbonate, 500 mg, Oral, BID PRN  metoprolol, 2.5 mg, Intravenous, Q6H PRN  ondansetron, 4 mg, Intravenous, Q6H PRN  senna-docusate sodium, 2 tablet, Oral, BID PRN  sodium chloride, 1 spray, Each Nare, Q1H PRN        Discharge Plan: to be determined     Network Utilization Review Department  ATTENTION: Please call with any questions or concerns to [phone] and carefully listen to the prompts so that you are directed to the right person. All voicemails are confidential.   For Discharge needs, contact Care Management DC Support Team at [phone] opt. [phone]  Send all requests for admission clinical reviews, approved or denied determinations and any other requests to dedicated fax number below belonging to the campus where the patient is receiving treatment. List of dedicated fax numbers for the Facilities:  FACILITY NAME UR FAX NUMBER   ADMISSION DENIALS (Administrative/Medical Necessity) [phone]   DISCHARGE SUPPORT TEAM (NETWORK) [phone]   PARENT CHILD HEALTH (Maternity/NICU/Pediatrics) [phone]   [facility] [phone]   [facility] [phone]   [facility] [phone]   General acute hospital [phone]   [facility] [phone]   [facility] [phone]   [facility] [phone]   [facility] [phone]   [facility] [phone]   [facility] [phone]   [facility] [phone]   [facility] [phone]

## 2024-02-09 NOTE — ASSESSMENT & PLAN NOTE
Unclear etiology although likely secondary to severe COVID-19 pneumonia in the setting of immunocompromise state or potentially organizing pneumonia  S/p IV Solu-Medrol 1000 mg x 3 days, now on oral prednisone 80 mg daily with taper by 10 mg weekly until complete  Diuresis per pulmonology, appreciate recommendations  ID following, appreciate recommendations  Currently monitoring off antibiotics, follow-up repeat sputum cultures  Currently on 15 L mid flow with tachycardia  CTA chest negative for PE  Continue PJP prophylaxis  Strict I/O

## 2024-02-09 NOTE — RESTORATIVE TECHNICIAN NOTE
Restorative Technician Note      Patient Name: Carla Hunt     Note Type: Mobility  Patient Position Upon Consult: Supine  Activity Performed: Ambulated; Dangled; Stood  Assistive Device: Roller walker; Other (Comment) (Assist x1 with chair follow and NC O2 on 15L)  Education Provided: Yes  Patient Position at End of Consult: Bedside chair; All needs within reach; Bed/Chair alarm activated    Héctor LAYNE, Restorative Technician,

## 2024-02-10 LAB
ANION GAP SERPL CALCULATED.3IONS-SCNC: 10 MMOL/L
BUN SERPL-MCNC: 27 MG/DL (ref 5–25)
CALCIUM SERPL-MCNC: 9.5 MG/DL (ref 8.4–10.2)
CHLORIDE SERPL-SCNC: 105 MMOL/L (ref 96–108)
CO2 SERPL-SCNC: 26 MMOL/L (ref 21–32)
CREAT SERPL-MCNC: 0.91 MG/DL (ref 0.6–1.3)
ERYTHROCYTE [DISTWIDTH] IN BLOOD BY AUTOMATED COUNT: 18.1 % (ref 11.6–15.1)
GFR SERPL CREATININE-BSD FRML MDRD: 69 ML/MIN/1.73SQ M
GLUCOSE SERPL-MCNC: 141 MG/DL (ref 65–140)
GLUCOSE SERPL-MCNC: 176 MG/DL (ref 65–140)
GLUCOSE SERPL-MCNC: 195 MG/DL (ref 65–140)
GLUCOSE SERPL-MCNC: 253 MG/DL (ref 65–140)
HCT VFR BLD AUTO: 35 % (ref 34.8–46.1)
HGB BLD-MCNC: 11.3 G/DL (ref 11.5–15.4)
MCH RBC QN AUTO: 29.7 PG (ref 26.8–34.3)
MCHC RBC AUTO-ENTMCNC: 32.3 G/DL (ref 31.4–37.4)
MCV RBC AUTO: 92 FL (ref 82–98)
PLATELET # BLD AUTO: 137 THOUSANDS/UL (ref 149–390)
PMV BLD AUTO: 11.9 FL (ref 8.9–12.7)
POTASSIUM SERPL-SCNC: 4.1 MMOL/L (ref 3.5–5.3)
RBC # BLD AUTO: 3.81 MILLION/UL (ref 3.81–5.12)
SODIUM SERPL-SCNC: 141 MMOL/L (ref 135–147)
WBC # BLD AUTO: 8.71 THOUSAND/UL (ref 4.31–10.16)

## 2024-02-10 PROCEDURE — 82948 REAGENT STRIP/BLOOD GLUCOSE: CPT

## 2024-02-10 PROCEDURE — 87081 CULTURE SCREEN ONLY: CPT

## 2024-02-10 PROCEDURE — 99233 SBSQ HOSP IP/OBS HIGH 50: CPT | Performed by: INTERNAL MEDICINE

## 2024-02-10 PROCEDURE — 94760 N-INVAS EAR/PLS OXIMETRY 1: CPT

## 2024-02-10 PROCEDURE — 85027 COMPLETE CBC AUTOMATED: CPT

## 2024-02-10 PROCEDURE — 99291 CRITICAL CARE FIRST HOUR: CPT | Performed by: EMERGENCY MEDICINE

## 2024-02-10 PROCEDURE — 99232 SBSQ HOSP IP/OBS MODERATE 35: CPT | Performed by: INTERNAL MEDICINE

## 2024-02-10 PROCEDURE — 80048 BASIC METABOLIC PNL TOTAL CA: CPT

## 2024-02-10 RX ORDER — LEVOFLOXACIN 750 MG/1
750 TABLET, FILM COATED ORAL EVERY 24 HOURS
Status: DISCONTINUED | OUTPATIENT
Start: 2024-02-10 | End: 2024-02-10

## 2024-02-10 RX ORDER — KETOROLAC TROMETHAMINE 30 MG/ML
15 INJECTION, SOLUTION INTRAMUSCULAR; INTRAVENOUS ONCE
Status: COMPLETED | OUTPATIENT
Start: 2024-02-10 | End: 2024-02-10

## 2024-02-10 RX ORDER — SULFAMETHOXAZOLE AND TRIMETHOPRIM 800; 160 MG/1; MG/1
2 TABLET ORAL 2 TIMES DAILY WITH MEALS
Status: DISCONTINUED | OUTPATIENT
Start: 2024-02-10 | End: 2024-02-10

## 2024-02-10 RX ORDER — SULFAMETHOXAZOLE AND TRIMETHOPRIM 800; 160 MG/1; MG/1
5 TABLET ORAL 3 TIMES DAILY
Status: DISCONTINUED | OUTPATIENT
Start: 2024-02-10 | End: 2024-02-19

## 2024-02-10 RX ORDER — FUROSEMIDE 10 MG/ML
40 INJECTION INTRAMUSCULAR; INTRAVENOUS ONCE
Status: DISCONTINUED | OUTPATIENT
Start: 2024-02-10 | End: 2024-02-10

## 2024-02-10 RX ORDER — METHYLPREDNISOLONE SODIUM SUCCINATE 125 MG/2ML
60 INJECTION, POWDER, LYOPHILIZED, FOR SOLUTION INTRAMUSCULAR; INTRAVENOUS EVERY 6 HOURS SCHEDULED
Status: DISCONTINUED | OUTPATIENT
Start: 2024-02-10 | End: 2024-02-11

## 2024-02-10 RX ADMIN — NYSTATIN 500000 UNITS: 100000 SUSPENSION ORAL at 10:04

## 2024-02-10 RX ADMIN — ENOXAPARIN SODIUM 40 MG: 40 INJECTION SUBCUTANEOUS at 10:00

## 2024-02-10 RX ADMIN — INSULIN LISPRO 2 UNITS: 100 INJECTION, SOLUTION INTRAVENOUS; SUBCUTANEOUS at 12:18

## 2024-02-10 RX ADMIN — INSULIN LISPRO 2 UNITS: 100 INJECTION, SOLUTION INTRAVENOUS; SUBCUTANEOUS at 17:57

## 2024-02-10 RX ADMIN — TOPIRAMATE 100 MG: 100 TABLET, FILM COATED ORAL at 13:01

## 2024-02-10 RX ADMIN — PREDNISONE 80 MG: 20 TABLET ORAL at 10:00

## 2024-02-10 RX ADMIN — LAMOTRIGINE 150 MG: 100 TABLET ORAL at 21:23

## 2024-02-10 RX ADMIN — SULFAMETHOXAZOLE AND TRIMETHOPRIM 2.5 TABLET: 800; 160 TABLET ORAL at 17:54

## 2024-02-10 RX ADMIN — SULFAMETHOXAZOLE AND TRIMETHOPRIM 2.5 TABLET: 800; 160 TABLET ORAL at 21:18

## 2024-02-10 RX ADMIN — ACETAMINOPHEN 650 MG: 325 TABLET, FILM COATED ORAL at 10:00

## 2024-02-10 RX ADMIN — INSULIN LISPRO 2 UNITS: 100 INJECTION, SOLUTION INTRAVENOUS; SUBCUTANEOUS at 21:29

## 2024-02-10 RX ADMIN — LAMOTRIGINE 150 MG: 100 TABLET ORAL at 10:00

## 2024-02-10 RX ADMIN — INSULIN LISPRO 2 UNITS: 100 INJECTION, SOLUTION INTRAVENOUS; SUBCUTANEOUS at 17:56

## 2024-02-10 RX ADMIN — TOPIRAMATE 100 MG: 100 TABLET, FILM COATED ORAL at 17:55

## 2024-02-10 RX ADMIN — MELATONIN 6 MG: at 21:23

## 2024-02-10 RX ADMIN — INSULIN LISPRO 1 UNITS: 100 INJECTION, SOLUTION INTRAVENOUS; SUBCUTANEOUS at 12:18

## 2024-02-10 RX ADMIN — NYSTATIN 500000 UNITS: 100000 SUSPENSION ORAL at 17:53

## 2024-02-10 RX ADMIN — NYSTATIN 500000 UNITS: 100000 SUSPENSION ORAL at 15:17

## 2024-02-10 RX ADMIN — ACETAMINOPHEN 650 MG: 325 TABLET, FILM COATED ORAL at 01:26

## 2024-02-10 RX ADMIN — VENLAFAXINE 25 MG: 25 TABLET ORAL at 13:00

## 2024-02-10 RX ADMIN — QUETIAPINE FUMARATE 12.5 MG: 25 TABLET ORAL at 21:23

## 2024-02-10 RX ADMIN — KETOROLAC TROMETHAMINE 15 MG: 30 INJECTION, SOLUTION INTRAMUSCULAR at 12:20

## 2024-02-10 RX ADMIN — VENLAFAXINE 25 MG: 25 TABLET ORAL at 17:56

## 2024-02-10 RX ADMIN — METHYLPREDNISOLONE SODIUM SUCCINATE 60 MG: 125 INJECTION, POWDER, FOR SOLUTION INTRAMUSCULAR; INTRAVENOUS at 18:06

## 2024-02-10 RX ADMIN — NYSTATIN 500000 UNITS: 100000 SUSPENSION ORAL at 06:01

## 2024-02-10 NOTE — QUICK NOTE
Notified by nursing staff of increased O2 requirement. Patient was between 10-15L midflow throughout the day. Around 2030, patient began to complain of shortness of breath and had decreased O2 sats. Was placed on NRB and subsequently transitioned to HFNC. Currently, O2 sats are in mid to low 90's on FiO2 of around 70. Patient denies any distress at this time and states that she feels better. CXR obtained which shows what appears to be pulmonary vascular congestion. 40 mg IV lasix ordered. As well, 80 mg IV solumedrol ordered. Discussed with critical care given significantly increased O2 requirement who agree with plan. Patient upgraded to SD2  as well. Will continue to monitor patient very closely overnight. From discussion with patient, I believe that she would greatly benefit from multidisciplinary discussion with treatment team regarding treatment and prognosis given consistently waxing and waning respiratory status.       Update: Patient titrated back up to fio2 of 100 due to hypoxia despite interventions. Reached out to critical care who agreed to upgrade patient again for closer monitoring given O2 requirement.

## 2024-02-10 NOTE — PROGRESS NOTES
"Progress Note - Pulmonary   Carla Hunt 59 y.o. female MRN: [id number]  Unit/Bed#: MICU 10 Encounter: [id number]      This is a pulmonary progress note. Critical Care progress note was attested by Dr Robin  Assessment:  Acute hypoxemic respiratory failur  Abnormal Chest CT- unchanged GGO on CT 2/9/24 from previous  Recent COVID 19 infection with residual abnormal Chest CT in setting of recent rituximab administration  Stenotrophomonas in sputum- unclear if pathogenic or colonized      Plan:  Continue HFNT, wean FiO2 for SPO2 >88%. Currently requiring FiO2 1.0  Continue solumedrol but increase to 60 mg q6 (although of note CRP was 1.0 recent check)  Lasix for negative fluid status  Restart Bactrim higher dosing for stenotrophomonas.  Patient had been on prophylactic dose for PJP  Consider bronchoscopy but at this point defer given severe hypoxemia.        Subjective:   Patient with some dyspnea.  Increased oxygen requirements so transferred to MICU    Objective:     Vitals: Blood pressure 110/62, pulse 93, temperature 99.7 °F (37.6 °C), temperature source Oral, resp. rate 20, height 5' 8\" (1.727 m), weight 73.6 kg (162 lb 4.1 oz), SpO2 90%.,Body mass index is 24.67 kg/m².      Intake/Output Summary (Last 24 hours) at 2/10/2024 1503  Last data filed at 2/10/2024 1200  Gross per 24 hour   Intake --   Output 1375 ml   Net -1375 ml       Invasive Devices       Peripheral Intravenous Line  Duration             Peripheral IV 02/09/24 Left;Upper;Ventral (anterior) Arm 1 day    Peripheral IV 02/10/24 Right;Upper;Ventral (anterior) Arm <1 day                  Vitals:    02/10/24 0752   BP:    Pulse:    Resp:    Temp:    SpO2: 90%     General:  Patient is awake, alert, non-toxic and in no acute respiratory distress  Eyes: PERRL, no scleral icterus  Neck: No JVD  CV:  Regular, +S1 and S2, No murmurs, gallops or rubs appreciated  Lungs: Rhonchi  Abdomen: Soft, +BS, Non-tender, non-distended  Extremities: No clubbing, " cyanosis or edema  Neuro: No focal motor/sensory deficits  Skin: Warm, No rashes or ulcerations      Labs: I have personally reviewed pertinent lab results.  Imaging and other studies: I have personally reviewed pertinent reports.   and I have personally reviewed pertinent films in PACS    CTA Chest 2/9/24  No pulmonary embolus.     No significant change since the chest CT from 2/3/2024 in extensive multifocal bilateral groundglass opacity, slightly greater on the right, and mild peripheral consolidation in the right lung with extensive bronchial dilation which may be due to a   combination of COVID-19 and Rituxan induced pneumonitis/fibrosis.

## 2024-02-10 NOTE — PROGRESS NOTES
[facility]  Progress Note: Critical Care  Name: Carla Hunt 59 y.o. female I MRN: [id number]  Unit/Bed#: MICU 10 I Date of Admission: 1/10/2024   Date of Service: 2/10/2024 I Hospital Day: 31    Assessment/Plan   Neuro:   Diagnosis: seizure disorder  S/p partial left temporal lobe resection in 2007  Contuinue home lamictal 150 bid and topamax 100 bid  Diagnosis: anxiety  Continue home effexor  Seroquel 12.5 hs and melatonin 6 hs for sleep    CV:   No active issues    Pulm:  Diagnosis: acute hypoxic respiratory failure   Tested COVID positive on 1/7  Completed severe COVID pathway and 7 days CTX  Tenuous respiratory status requiring multiple re-admissions to MICU  S/p Bronch 2/2   NG on cultures (initially showed non-group A strep)  PCP and AFB negative   Legionella, viral, fungal cultures no growth to date  Pulse dose steroids with solumedrol 1g daily x3 days (completed 2/7) then transitioned to prednisone 80mg daily on 2/8  S/p Solumedrol 80mg IV and Lasix 40mg IV once   See remainder of plan under ID    GI:   No active issues  Bowel regimen with daily MiraLAX and twice daily senna    :   Diagnosis: CKD III  Baseline Cr appears to be 0.8-1.2  UA unremarkable   Upon chart review 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  On rituxan for 2 year course, started May 2022  Last dose was 12/20, treatment currently on hold   Leukopenic on admission but WBC now wnl  DVT ppx with lovenox      Endo:   Diagnosis: steroid hyperglycemia   SSI, hypoglycemic protocol  Goal -180     ID:   Diagnosis: COVID pneumonia vs tracheobronchitis  Completed severe  COVID pathway with decadron (ended 1/22) and remdesivir (ended 1/20), also completed 7 days CXT on 1/15  C/f opportunistic infections given immunocompromised status on chemotherapy and recent steroid use  No consolidations on CXR and procal negative  S/p bactrim and minocycline x5 days for stenotrophomonas on sputum culture (1/25)  Bronc on 2/2 - Cx w/o growth (initially resulted as 1+ alpha hemolytic strep), AFB & PCP negative, f/u fungal, legionella, and viral cultures   Repeat sputum culture 2/9 pending   ID previously following, s/o 2/8  Patient on Bactrim for PJP ppx        MSK/Skin:   No active issues    Disposition: Critical care    ICU Core Measures     A: Assess, Prevent, and Manage Pain Has pain been assessed? Yes  Need for changes to pain regimen? No   B: Both SAT/SAT  N/A   C: Choice of Sedation RASS Goal: 0 Alert and Calm  Need for changes to sedation or analgesia regimen? No   D: Delirium CAM-ICU: Negative   E: Early Mobility  Plan for early mobility? Yes   F: Family Engagement Plan for family engagement today? Yes       Antibiotic Review: No current antibiotic needs    Review of Invasive Devices:            Prophylaxis:  VTE VTE covered by:  enoxaparin, Subcutaneous, 40 mg at 02/09/24 0931       Stress Ulcer  not ordered        Significant 24hr Events     24hr events: Patient had increasing oxygen requirements, was previously on 10-15L midflow now to HFNC 55L 100% FIO2. Patient reported increased shortness of breath and continues to have productive cough. She had a repeat chest CT completed 2/9 which showed persistent extensive multifocal bilateral groundglass opacities and mild peripheral consolidation in the right lung with extensive bronchial dilation.  Repeat chest x-ray was concerning for increased opacities and vascular congestion. Patient was given Lasix 40mg IV and methylpred 80mg IV once. Patient upgraded to MICU for tenuous respiratory status and the need for HFNC.      Subjective     Review  of systems:  12 point review of systems was completed and was otherwise negative except as listed in HPI.       Objective                            Vitals I/O      Most Recent Min/Max in 24hrs   Temp 99.7 °F (37.6 °C) Temp  Min: 97.8 °F (36.6 °C)  Max: 100 °F (37.8 °C)   Pulse (!) 110 Pulse  Min: 90  Max: 118   Resp 21 Resp  Min: 20  Max: 32   /74 BP  Min: 106/74  Max: 119/59   O2 Sat 97 % SpO2  Min: 87 %  Max: 98 %      Intake/Output Summary (Last 24 hours) at 2/10/2024 0114  Last data filed at 2/9/2024 1301  Gross per 24 hour   Intake 480 ml   Output 700 ml   Net -220 ml       Diet Regular; Regular House    Invasive Monitoring           Physical Exam   Physical Exam  Vitals reviewed.   Eyes:      Extraocular Movements: Extraocular movements intact.      Conjunctiva/sclera: Conjunctivae normal.      Pupils: Pupils are equal, round, and reactive to light.   Skin:     General: Skin is warm and dry.   HENT:      Head: Normocephalic and atraumatic.      Mouth/Throat:      Mouth: Mucous membranes are moist.   Cardiovascular:      Rate and Rhythm: Normal rate and regular rhythm.      Pulses: Normal pulses.      Heart sounds: No murmur heard.     No friction rub. No gallop.   Musculoskeletal:         General: No swelling. Normal range of motion.   Abdominal: General: Bowel sounds are normal. There is no distension.      Palpations: Abdomen is soft.      Tenderness: There is no abdominal tenderness.   Constitutional:       General: She is not in acute distress.     Appearance: She is well-developed. She is ill-appearing.   Pulmonary:      Effort: Pulmonary effort is normal.      Breath sounds: Rales present.   Neurological:      General: No focal deficit present.      Mental Status: She is alert and oriented to person, place and time. Mental status is at baseline.            Diagnostic Studies      EKG: Sinus tachycardia w/ ventricular rate 105, , Qtc 402 (2/3/2024)  Imaging:  I have personally reviewed  pertinent films in PACS     Medications:  Scheduled PRN   enoxaparin, 40 mg, Daily  insulin lispro, 1-5 Units, 4x Daily (AC & HS)  [Transfer Hold] insulin lispro, 2 Units, TID With Meals  lamoTRIgine, 150 mg, BID  melatonin, 6 mg, HS  nystatin, 500,000 Units, 4x Daily  polyethylene glycol, 17 g, Daily  predniSONE, 80 mg, Daily  QUEtiapine, 12.5 mg, HS  [Transfer Hold] sulfamethoxazole-trimethoprim, 1 tablet, Once per day on Monday Wednesday Friday  topiramate, 100 mg, BID  venlafaxine, 25 mg, TID With Meals      acetaminophen, 650 mg, Q6H PRN  bisacodyl, 10 mg, Daily PRN  calcium carbonate, 500 mg, BID PRN  metoprolol, 2.5 mg, Q6H PRN  ondansetron, 4 mg, Q6H PRN  [Transfer Hold] senna-docusate sodium, 2 tablet, BID PRN  sodium chloride, 1 spray, Q1H PRN       Continuous          Labs:    CBC    Recent Labs     02/08/24  0504 02/09/24  0832   WBC 8.40 6.63   HGB 11.5 11.4*   HCT 35.8 36.0    157   BANDSPCT  --  8     BMP    Recent Labs     02/08/24  0504 02/09/24  0628   SODIUM 143 147   K 3.7 3.7    111*   CO2 30 26   AGAP 6 10   BUN 32* 32*   CREATININE 1.08 1.04   CALCIUM 9.4 9.7       Coags    No recent results     Additional Electrolytes  No recent results       Blood Gas    No recent results  No recent results LFTs  No recent results    Infectious  Recent Labs     02/08/24  0504   PROCALCITONI 0.06     Glucose  Recent Labs     02/08/24  0504 02/09/24  0628   GLUC 122 100                   Taiwo Preciado DO

## 2024-02-11 ENCOUNTER — APPOINTMENT (INPATIENT)
Dept: RADIOLOGY | Facility: HOSPITAL | Age: 59
DRG: 003 | End: 2024-02-11
Payer: COMMERCIAL

## 2024-02-11 LAB
ANION GAP SERPL CALCULATED.3IONS-SCNC: 8 MMOL/L
BNP SERPL-MCNC: 36 PG/ML (ref 0–100)
BUN SERPL-MCNC: 31 MG/DL (ref 5–25)
CALCIUM SERPL-MCNC: 9.5 MG/DL (ref 8.4–10.2)
CHLORIDE SERPL-SCNC: 105 MMOL/L (ref 96–108)
CO2 SERPL-SCNC: 26 MMOL/L (ref 21–32)
CREAT SERPL-MCNC: 0.88 MG/DL (ref 0.6–1.3)
CRP SERPL QL: 31.6 MG/L
ERYTHROCYTE [DISTWIDTH] IN BLOOD BY AUTOMATED COUNT: 17.7 % (ref 11.6–15.1)
GFR SERPL CREATININE-BSD FRML MDRD: 72 ML/MIN/1.73SQ M
GLUCOSE SERPL-MCNC: 135 MG/DL (ref 65–140)
GLUCOSE SERPL-MCNC: 151 MG/DL (ref 65–140)
GLUCOSE SERPL-MCNC: 153 MG/DL (ref 65–140)
GLUCOSE SERPL-MCNC: 164 MG/DL (ref 65–140)
GLUCOSE SERPL-MCNC: 191 MG/DL (ref 65–140)
GLUCOSE SERPL-MCNC: 268 MG/DL (ref 65–140)
GLUCOSE SERPL-MCNC: 270 MG/DL (ref 65–140)
HCT VFR BLD AUTO: 34.4 % (ref 34.8–46.1)
HGB BLD-MCNC: 10.8 G/DL (ref 11.5–15.4)
MAGNESIUM SERPL-MCNC: 2.1 MG/DL (ref 1.9–2.7)
MCH RBC QN AUTO: 29.3 PG (ref 26.8–34.3)
MCHC RBC AUTO-ENTMCNC: 31.4 G/DL (ref 31.4–37.4)
MCV RBC AUTO: 93 FL (ref 82–98)
PHOSPHATE SERPL-MCNC: 3.1 MG/DL (ref 2.7–4.5)
PLATELET # BLD AUTO: 165 THOUSANDS/UL (ref 149–390)
PMV BLD AUTO: 11.4 FL (ref 8.9–12.7)
POTASSIUM SERPL-SCNC: 4.2 MMOL/L (ref 3.5–5.3)
RBC # BLD AUTO: 3.69 MILLION/UL (ref 3.81–5.12)
SODIUM SERPL-SCNC: 139 MMOL/L (ref 135–147)
WBC # BLD AUTO: 8.24 THOUSAND/UL (ref 4.31–10.16)

## 2024-02-11 PROCEDURE — 82948 REAGENT STRIP/BLOOD GLUCOSE: CPT

## 2024-02-11 PROCEDURE — 86038 ANTINUCLEAR ANTIBODIES: CPT | Performed by: INTERNAL MEDICINE

## 2024-02-11 PROCEDURE — 83735 ASSAY OF MAGNESIUM: CPT

## 2024-02-11 PROCEDURE — 85027 COMPLETE CBC AUTOMATED: CPT

## 2024-02-11 PROCEDURE — 99233 SBSQ HOSP IP/OBS HIGH 50: CPT | Performed by: INTERNAL MEDICINE

## 2024-02-11 PROCEDURE — 86200 CCP ANTIBODY: CPT | Performed by: INTERNAL MEDICINE

## 2024-02-11 PROCEDURE — 83880 ASSAY OF NATRIURETIC PEPTIDE: CPT | Performed by: STUDENT IN AN ORGANIZED HEALTH CARE EDUCATION/TRAINING PROGRAM

## 2024-02-11 PROCEDURE — 71045 X-RAY EXAM CHEST 1 VIEW: CPT

## 2024-02-11 PROCEDURE — 94760 N-INVAS EAR/PLS OXIMETRY 1: CPT

## 2024-02-11 PROCEDURE — 94669 MECHANICAL CHEST WALL OSCILL: CPT

## 2024-02-11 PROCEDURE — 94003 VENT MGMT INPAT SUBQ DAY: CPT

## 2024-02-11 PROCEDURE — 94645 CONT INHLJ TX EACH ADDL HOUR: CPT

## 2024-02-11 PROCEDURE — NC001 PR NO CHARGE: Performed by: INTERNAL MEDICINE

## 2024-02-11 PROCEDURE — 94664 DEMO&/EVAL PT USE INHALER: CPT

## 2024-02-11 PROCEDURE — 83520 IMMUNOASSAY QUANT NOS NONAB: CPT | Performed by: INTERNAL MEDICINE

## 2024-02-11 PROCEDURE — 86430 RHEUMATOID FACTOR TEST QUAL: CPT | Performed by: INTERNAL MEDICINE

## 2024-02-11 PROCEDURE — 94640 AIRWAY INHALATION TREATMENT: CPT

## 2024-02-11 PROCEDURE — 86602 ANTINOMYCES ANTIBODY: CPT | Performed by: STUDENT IN AN ORGANIZED HEALTH CARE EDUCATION/TRAINING PROGRAM

## 2024-02-11 PROCEDURE — 86331 IMMUNODIFFUSION OUCHTERLONY: CPT | Performed by: STUDENT IN AN ORGANIZED HEALTH CARE EDUCATION/TRAINING PROGRAM

## 2024-02-11 PROCEDURE — 86671 FUNGUS NES ANTIBODY: CPT | Performed by: STUDENT IN AN ORGANIZED HEALTH CARE EDUCATION/TRAINING PROGRAM

## 2024-02-11 PROCEDURE — 86140 C-REACTIVE PROTEIN: CPT | Performed by: STUDENT IN AN ORGANIZED HEALTH CARE EDUCATION/TRAINING PROGRAM

## 2024-02-11 PROCEDURE — 86037 ANCA TITER EACH ANTIBODY: CPT | Performed by: INTERNAL MEDICINE

## 2024-02-11 PROCEDURE — 94668 MNPJ CHEST WALL SBSQ: CPT

## 2024-02-11 PROCEDURE — 86606 ASPERGILLUS ANTIBODY: CPT | Performed by: STUDENT IN AN ORGANIZED HEALTH CARE EDUCATION/TRAINING PROGRAM

## 2024-02-11 PROCEDURE — 94644 CONT INHLJ TX 1ST HOUR: CPT

## 2024-02-11 PROCEDURE — 84100 ASSAY OF PHOSPHORUS: CPT

## 2024-02-11 PROCEDURE — 80048 BASIC METABOLIC PNL TOTAL CA: CPT

## 2024-02-11 RX ORDER — FUROSEMIDE 10 MG/ML
40 INJECTION INTRAMUSCULAR; INTRAVENOUS ONCE
Status: COMPLETED | OUTPATIENT
Start: 2024-02-11 | End: 2024-02-11

## 2024-02-11 RX ADMIN — LAMOTRIGINE 150 MG: 100 TABLET ORAL at 21:42

## 2024-02-11 RX ADMIN — POLYETHYLENE GLYCOL 3350 17 G: 17 POWDER, FOR SOLUTION ORAL at 09:54

## 2024-02-11 RX ADMIN — FUROSEMIDE 40 MG: 10 INJECTION, SOLUTION INTRAMUSCULAR; INTRAVENOUS at 12:51

## 2024-02-11 RX ADMIN — EPOPROSTENOL 6.4 NG/KG/MIN: 1.5 INJECTION, POWDER, LYOPHILIZED, FOR SOLUTION INTRAVENOUS at 11:23

## 2024-02-11 RX ADMIN — INSULIN LISPRO 1 UNITS: 100 INJECTION, SOLUTION INTRAVENOUS; SUBCUTANEOUS at 18:18

## 2024-02-11 RX ADMIN — INSULIN LISPRO 3 UNITS: 100 INJECTION, SOLUTION INTRAVENOUS; SUBCUTANEOUS at 21:48

## 2024-02-11 RX ADMIN — VENLAFAXINE 25 MG: 25 TABLET ORAL at 16:42

## 2024-02-11 RX ADMIN — NYSTATIN 500000 UNITS: 100000 SUSPENSION ORAL at 18:18

## 2024-02-11 RX ADMIN — QUETIAPINE FUMARATE 12.5 MG: 25 TABLET ORAL at 21:43

## 2024-02-11 RX ADMIN — LAMOTRIGINE 150 MG: 100 TABLET ORAL at 10:10

## 2024-02-11 RX ADMIN — ENOXAPARIN SODIUM 40 MG: 40 INJECTION SUBCUTANEOUS at 09:54

## 2024-02-11 RX ADMIN — INSULIN LISPRO 1 UNITS: 100 INJECTION, SOLUTION INTRAVENOUS; SUBCUTANEOUS at 08:29

## 2024-02-11 RX ADMIN — TOPIRAMATE 100 MG: 100 TABLET, FILM COATED ORAL at 18:18

## 2024-02-11 RX ADMIN — TOPIRAMATE 100 MG: 100 TABLET, FILM COATED ORAL at 09:56

## 2024-02-11 RX ADMIN — METHYLPREDNISOLONE SODIUM SUCCINATE 60 MG: 125 INJECTION, POWDER, FOR SOLUTION INTRAMUSCULAR; INTRAVENOUS at 00:58

## 2024-02-11 RX ADMIN — Medication 27.5 G: at 14:44

## 2024-02-11 RX ADMIN — INSULIN LISPRO 2 UNITS: 100 INJECTION, SOLUTION INTRAVENOUS; SUBCUTANEOUS at 18:19

## 2024-02-11 RX ADMIN — INSULIN LISPRO 1 UNITS: 100 INJECTION, SOLUTION INTRAVENOUS; SUBCUTANEOUS at 12:50

## 2024-02-11 RX ADMIN — SULFAMETHOXAZOLE AND TRIMETHOPRIM 2.5 TABLET: 800; 160 TABLET ORAL at 16:42

## 2024-02-11 RX ADMIN — SODIUM CHLORIDE 380 MG: 0.9 INJECTION, SOLUTION INTRAVENOUS at 12:52

## 2024-02-11 RX ADMIN — VENLAFAXINE 25 MG: 25 TABLET ORAL at 13:02

## 2024-02-11 RX ADMIN — METHYLPREDNISOLONE SODIUM SUCCINATE 60 MG: 125 INJECTION, POWDER, FOR SOLUTION INTRAMUSCULAR; INTRAVENOUS at 06:23

## 2024-02-11 RX ADMIN — VENLAFAXINE 25 MG: 25 TABLET ORAL at 08:29

## 2024-02-11 RX ADMIN — SULFAMETHOXAZOLE AND TRIMETHOPRIM 2.5 TABLET: 800; 160 TABLET ORAL at 09:56

## 2024-02-11 RX ADMIN — MELATONIN 6 MG: at 21:43

## 2024-02-11 RX ADMIN — INSULIN LISPRO 2 UNITS: 100 INJECTION, SOLUTION INTRAVENOUS; SUBCUTANEOUS at 08:30

## 2024-02-11 RX ADMIN — EPOPROSTENOL 50 NG/KG/MIN: 1.5 INJECTION, POWDER, LYOPHILIZED, FOR SOLUTION INTRAVENOUS at 18:21

## 2024-02-11 RX ADMIN — NYSTATIN 500000 UNITS: 100000 SUSPENSION ORAL at 09:54

## 2024-02-11 RX ADMIN — SULFAMETHOXAZOLE AND TRIMETHOPRIM 2.5 TABLET: 800; 160 TABLET ORAL at 21:42

## 2024-02-11 RX ADMIN — INSULIN LISPRO 2 UNITS: 100 INJECTION, SOLUTION INTRAVENOUS; SUBCUTANEOUS at 12:51

## 2024-02-11 RX ADMIN — NYSTATIN 500000 UNITS: 100000 SUSPENSION ORAL at 14:15

## 2024-02-11 RX ADMIN — NYSTATIN 500000 UNITS: 100000 SUSPENSION ORAL at 06:22

## 2024-02-11 NOTE — PROGRESS NOTES
Progress Note - Infectious Disease   Carla Hunt 59 y.o. female MRN: [id number]  [facility]: [id number]      Impression/Recommendations:  Acute hypoxic respiratory failure, present on admission, secondary to mostly COVID but there was concern for concurrent bacterial pneumonia on admission due to elevated procalcitonin.  Therefore, patient completed treatment course for both COVID and bacterial pneumonia.  She was clinically improved with decreasing O2 requirement.  However, patient deteriorated 2 weeks ago, requiring to be placed back on high flow O2 support.  Chest CT more suggestive of COVID and postviral bacterial pneumonia.  Procalcitonin x 3 have been in the normal range.  Patient has no fever or leukocytosis.  He improved with increasing steroid dose.  Expectorated sputum culture grew stenotrophomonas and patient was therefore treated with a 5-day course of Bactrim/minocycline.  She subsequently had bronchoscopy, with negative BAL culture but completed the course of Bactrim/minocycline prior to bronchoscopy.  Patient improved and O2 was being weaned again until yesterday, when she deteriorated again.  She is now back in ICU.  Expectorated sputum once again is growing stenotrophomonas.  Repeat chest CT showed stable post-COVID changes, without consolidation.  Not sure that the stenotrophomonas that is grown again and expectorated sputum is pathogenic versus upper airway colonization.  Patient is now back on high-dose steroid and Bactrim.  Respiratory status worsened overnight but improved today.  Continue high-dose Bactrim.    Continue high-dose systemic corticosteroid.  Monitor temperature/WBC.  Monitor respiratory symptoms     Severe COVID, present on admission.  Patient was treated with remdesivir, dexamethasone and was given 1 dose of Tocilizumab.    No additional antiviral needed.     CKD.  Creatinine improved.  Monitor creatinine.     Encephalopathy on admission.  This has  resolved.  There was concern for possible seizure but no witnessed seizure and EEG did not capture it.  Monitor mental status.     Seizure disorder, status post temporal lobe resection in .     B-cell lymphoma, on chemotherapy, which is currently postponed.  Patient was leukopenic on admission but WBC now is near normal range.  Monitor WBC/ANC.     Discussed with patient in detail regarding the above plan.  Discussed with Dr. Cardenas from critical medicine service regarding continuation of high-dose Bactrim and high-dose steroid.  He is in agreement.     Antibiotics:  Bactrim # 2     Subjective:  Patient had increased hypoxia overnight, now improved after initiation of Veletri.  She remains on high flow O2 support.  Temperature stays down.  No chills.  She is tolerating antibiotic well.  No nausea, vomiting or diarrhea.    Objective:  Vitals:  Temp:  [97.8 °F (36.6 °C)-98.9 °F (37.2 °C)] 97.8 °F (36.6 °C)  HR:  [] 75  Resp:  [19-29] 20  BP: (100-129)/(55-70) 110/57  SpO2:  [85 %-100 %] 100 %  Temp (24hrs), Av.2 °F (36.8 °C), Min:97.8 °F (36.6 °C), Max:98.9 °F (37.2 °C)  Current: Temperature: 97.8 °F (36.6 °C)    Physical Exam:     General: Awake, alert, cooperative, no distress.   Neck:  Supple. No mass.  No lymphadenopathy.   Lungs: Decreased breath sounds, stable basilar rhonchi with sparse rales, no wheezing, respirations labored.   Heart:  Regular rate and rhythm, S1 and S2 normal, no murmur.   Abdomen: Soft, nondistended, non-tender, bowel sounds active all four quadrants, no masses, no organomegaly.   Extremities: Stable mild leg edema. No erythema/warmth. No ulcer. Nontender to palpation.   Skin:  No rash.   Neuro: Moves all extremities.     Invasive Devices       Peripheral Intravenous Line  Duration             Peripheral IV 24 Left;Upper;Ventral (anterior) Arm 2 days    Peripheral IV 02/10/24 Right;Upper;Ventral (anterior) Arm 1 day                    Labs studies:   I have personally  reviewed pertinent labs.  Results from last 7 days   Lab Units 02/11/24  0450 02/10/24  0554 02/09/24  0628   POTASSIUM mmol/L 4.2 4.1 3.7   CHLORIDE mmol/L 105 105 111*   CO2 mmol/L 26 26 26   BUN mg/dL 31* 27* 32*   CREATININE mg/dL 0.88 0.91 1.04   EGFR ml/min/1.73sq m 72 69 59   CALCIUM mg/dL 9.5 9.5 9.7     Results from last 7 days   Lab Units 02/11/24  0450 02/10/24  0554 02/09/24  0832   WBC Thousand/uL 8.24 8.71 6.63   HEMOGLOBIN g/dL 10.8* 11.3* 11.4*   PLATELETS Thousands/uL 165 137* 157     Results from last 7 days   Lab Units 02/09/24  1203   SPUTUM CULTURE  4+ Growth of Stenotrophomonas maltophilia*  2+ Growth of   GRAM STAIN RESULT  No Epithelial cells per low power field*  No Polys*  4+ Gram negative rods*  Rare Gram positive cocci in clusters*       Imaging Studies:   I have personally reviewed pertinent imaging study reports and images in PACS.    EKG, Pathology, and Other Studies:   I have personally reviewed pertinent reports.

## 2024-02-11 NOTE — PROGRESS NOTES
[facility]  Progress Note: Critical Care  Name: Carla Hunt 59 y.o. female I MRN: [id number]  Unit/Bed#: MICU 10 I Date of Admission: 1/10/2024   Date of Service: 2/11/2024 I Hospital Day: 32    Assessment/Plan   Neuro:   Diagnosis: seizure disorder  S/p partial left temporal lobe resection in 2007  Contuinue home lamictal 150 bid and topamax 100 bid  Diagnosis: anxiety  Continue home effexor  Seroquel 12.5 hs and melatonin 6 hs for sleep     CV:   No active issues     Pulm:  Diagnosis: acute hypoxic respiratory failure   Tested COVID positive on 1/7  Completed severe COVID pathway and 7 days CTX  Tenuous respiratory status requiring multiple re-admissions to MICU  S/p Bronch 2/2   NG on cultures (initially showed non-group A strep)  PCP and AFB negative   Legionella, viral, fungal cultures no growth to date  Pulse dose steroids with solumedrol 1g daily x3 days (completed 2/7) then transitioned to prednisone 80mg daily on 2/8  S/p Solumedrol 80mg IV and Lasix 40mg IV once 2/10  2/10 increased steroids to 60mg q6h  See remainder of plan under ID     GI:   No active issues  Bowel regimen with daily MiraLAX and twice daily senna     :   Diagnosis: CKD III  Baseline Cr appears to be 0.8-1.2  UA unremarkable   Upon chart review 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) though unclear how this was diagnosed, this also does not enirely fit as she is NOT hypokalemic  Continue to monitor I/O and UOP     F/E/N:   F: none, boluses as indicated   E: monitor and replete for goal K>4, P>3, Mg>2  N: regular house      Heme/Onc:   Diagnosis: B cell lymphoma  Follows with heme/onc at [facility]  On rituxan for 2 year course, started May 2022  Last dose was 12/20, treatment currently on hold   Leukopenic on admission but WBC now wnl  DVT ppx with lovenox      Endo:   Diagnosis: steroid hyperglycemia   SSI, hypoglycemic protocol  Goal -180     ID:   Diagnosis: COVID  pneumonia vs tracheobronchitis  Completed severe COVID pathway with decadron (ended 1/22) and remdesivir (ended 1/20), also completed 7 days CXT on 1/15  C/f opportunistic infections given immunocompromised status on chemotherapy and recent steroid use  No consolidations on CXR and procal negative  S/p bactrim and minocycline x5 days for stenotrophomonas on sputum culture (1/25), then on bactrim for PJP ppx  Bronc on 2/2 - Cx w/o growth (initially resulted as 1+ alpha hemolytic strep), AFB & PCP negative, f/u fungal, legionella, and viral cultures   2/10 pt again had escalating O2 requirements on floors necessitating readmission to ICU  Concern that with recent pulse dose steroids and now worsening respiratory status she could have infection, possibly opportunistic   Repeat sputum culture 2/9 with 4+ stenotrophomonas  In discussion with ID will use high dose bactrim         MSK/Skin:   No active issues    Disposition: Critical care    ICU Core Measures     A: Assess, Prevent, and Manage Pain Has pain been assessed? Yes  Need for changes to pain regimen? No   B: Both SAT/SAT  N/A   C: Choice of Sedation RASS Goal: 0 Alert and Calm  Need for changes to sedation or analgesia regimen? No   D: Delirium CAM-ICU: Negative   E: Early Mobility  Plan for early mobility? Yes   F: Family Engagement Plan for family engagement today? Yes       Antibiotic Review: Patient on appropriate coverage based on culture data.     Review of Invasive Devices:            Prophylaxis:  VTE VTE covered by:  enoxaparin, Subcutaneous, 40 mg at 02/10/24 1000       Stress Ulcer  not ordered        Significant 24hr Events     24hr events: No events overnight. Pt continues to intermittently desaturate today with minor exertion such as eating and talking.      Subjective     Review of Systems   All other systems reviewed and are negative.       Objective                            Vitals I/O      Most Recent Min/Max in 24hrs   Temp 98.1 °F (36.7 °C)  Temp  Min: 98.1 °F (36.7 °C)  Max: 98.9 °F (37.2 °C)   Pulse 68 Pulse  Min: 68  Max: 119   Resp 21 Resp  Min: 19  Max: 26   /56 BP  Min: 95/51  Max: 120/61   O2 Sat 91 % SpO2  Min: 88 %  Max: 98 %      Intake/Output Summary (Last 24 hours) at 2/11/2024 0840  Last data filed at 2/11/2024 0615  Gross per 24 hour   Intake --   Output 1500 ml   Net -1500 ml       Diet Regular; Regular House    Invasive Monitoring   None         Physical Exam   Physical Exam  Eyes:      General:         Right eye: No discharge.         Left eye: No discharge.      Conjunctiva/sclera: Conjunctivae normal.   Skin:     General: Skin is warm and dry.   HENT:      Head: Normocephalic and atraumatic.      Right Ear: No drainage.      Left Ear: No drainage.      Nose: No congestion.      Mouth/Throat:      Mouth: Mucous membranes are moist.   Cardiovascular:      Rate and Rhythm: Normal rate and regular rhythm.   Musculoskeletal:      Right lower leg: No edema.      Left lower leg: No edema.   Abdominal: General: Bowel sounds are normal. There is no distension.      Palpations: Abdomen is soft.      Tenderness: There is no abdominal tenderness. There is no guarding.   Constitutional:       General: She is not in acute distress.     Interventions: She is not sedated, not intubated and not restrained.  Pulmonary:      Effort: Pulmonary effort is normal. No respiratory distress. She is not intubated.      Breath sounds: Normal breath sounds.   Neurological:      Mental Status: She is alert and oriented to person, place and time.   Genitourinary/Anorectal:  external catheter present.          Diagnostic Studies      EKG: no new  Imaging: no new      Medications:  Scheduled PRN   enoxaparin, 40 mg, Daily  insulin lispro, 1-5 Units, 4x Daily (AC & HS)  insulin lispro, 2 Units, TID With Meals  lamoTRIgine, 150 mg, BID  melatonin, 6 mg, HS  methylPREDNISolone sodium succinate, 60 mg, Q6H SARTHAK  nystatin, 500,000 Units, 4x Daily  polyethylene  glycol, 17 g, Daily  QUEtiapine, 12.5 mg, HS  sulfamethoxazole-trimethoprim, 5 mg/kg of trimethoprim, TID  topiramate, 100 mg, BID  venlafaxine, 25 mg, TID With Meals      acetaminophen, 650 mg, Q6H PRN  bisacodyl, 10 mg, Daily PRN  calcium carbonate, 500 mg, BID PRN  metoprolol, 2.5 mg, Q6H PRN  ondansetron, 4 mg, Q6H PRN  senna-docusate sodium, 2 tablet, BID PRN  sodium chloride, 1 spray, Q1H PRN       Continuous          Labs:    CBC    Recent Labs     02/10/24  0554 02/11/24  0450   WBC 8.71 8.24   HGB 11.3* 10.8*   HCT 35.0 34.4*   * 165     BMP    Recent Labs     02/10/24  0554 02/11/24  0450   SODIUM 141 139   K 4.1 4.2    105   CO2 26 26   AGAP 10 8   BUN 27* 31*   CREATININE 0.91 0.88   CALCIUM 9.5 9.5       Coags    No recent results     Additional Electrolytes  Recent Labs     02/11/24  0450   MG 2.1   PHOS 3.1          Blood Gas    No recent results  No recent results LFTs  No recent results    Infectious  No recent results  Glucose  Recent Labs     02/10/24  0554 02/11/24  0450   GLUC 176* 135                   Risa Handley MD

## 2024-02-11 NOTE — PROGRESS NOTES
Progress Note - Infectious Disease   Carla Hunt 59 y.o. female MRN: [id number]  [facility]: [id number]      Impression/Recommendations:  Acute hypoxic respiratory failure, present on admission, secondary to mostly COVID but there was concern for concurrent bacterial pneumonia on admission due to elevated procalcitonin.  Therefore, patient completed treatment course for both COVID and bacterial pneumonia.  She was clinically improved with decreasing O2 requirement.  However, patient deteriorated 2 weeks ago, requiring to be placed back on high flow O2 support.  Chest CT more suggestive of COVID and postviral bacterial pneumonia.  Procalcitonin x 3 have been in the normal range.  Patient has no fever or leukocytosis.  He improved with increasing steroid dose.  Expectorated sputum culture grew stenotrophomonas and patient was therefore treated with a 5-day course of Bactrim/minocycline.  She subsequently had bronchoscopy, with negative BAL culture but completed the course of Bactrim/minocycline prior to bronchoscopy.  Patient improved and O2 was being weaned again until yesterday, when she deteriorated again.  She is now back in ICU.  Expectorated sputum once again is growing stenotrophomonas.  Repeat chest CT showed stable post-COVID changes, without consolidation.  Not sure that the stenotrophomonas that is grown again and expectorated sputum is pathogenic versus upper airway colonization.  Patient is now back on high-dose steroid and Bactrim.  Unfortunately, at present, patient is too unstable for bronchoscopy.  Once her respiratory status is more stable, bronchoscopy would be helpful to determine whether she truly has bacterial pneumonia  Restart high-dose Bactrim.    High-dose systemic corticosteroid.  Monitor temperature/WBC.  Monitor respiratory symptoms     Severe COVID, present on admission.  Patient was treated with remdesivir, dexamethasone and was given 1 dose of Tocilizumab.    No  additional antiviral needed.     CKD.  Creatinine improved.  Monitor creatinine.     Encephalopathy on admission.  This has resolved.  There was concern for possible seizure but no witnessed seizure and EEG did not capture it.  Monitor mental status.     Seizure disorder, status post temporal lobe resection in .     B-cell lymphoma, on chemotherapy, which is currently postponed.  Patient was leukopenic on admission but WBC now is near normal range.  Monitor WBC/ANC.     Discussed with patient and her  in detail regarding the above plan.  Discussed with Dr. Breen from critical medicine service regarding uncertainty of significance of stenotrophomonas and expectorated sputum culture.  Bronchoscopy may be helpful.  He is considering bronchoscopy when patient's respiratory status is improved enough to tolerated.    Antibiotics:  None    Subjective:  Patient deteriorated overnight, with increased need for O2 support.  She was transferred to ICU.  She is back on high flow O2 support.  Picture stays down.  No chills.    Objective:  Vitals:  Temp:  [98.3 °F (36.8 °C)-99.7 °F (37.6 °C)] 98.3 °F (36.8 °C)  HR:  [] 119  Resp:  [19-26] 22  BP: ()/(51-74) 112/58  SpO2:  [88 %-98 %] 95 %  Temp (24hrs), Av.9 °F (37.2 °C), Min:98.3 °F (36.8 °C), Max:99.7 °F (37.6 °C)  Current: Temperature: 98.3 °F (36.8 °C)    Physical Exam:     General: Awake, alert, cooperative, no distress.   Neck:  Supple. No mass.  No lymphadenopathy.   Lungs: Expansion symmetric, diffuse rhonchi, basilar rales, no wheezing, respirations labored.   Heart:  Tachycardic with regular rhythm, S1 and S2 normal, no murmur.   Abdomen: Soft, nondistended, non-tender, bowel sounds active all four quadrants, no masses, no organomegaly.   Extremities: Stable mild leg edema. No erythema/warmth. No ulcer. Nontender to palpation.   Skin:  No rash.   Neuro: Moves all extremities.     Invasive Devices       Peripheral Intravenous Line  Duration              Peripheral IV 02/09/24 Left;Upper;Ventral (anterior) Arm 1 day    Peripheral IV 02/10/24 Right;Upper;Ventral (anterior) Arm <1 day                    Labs studies:   I have personally reviewed pertinent labs.  Results from last 7 days   Lab Units 02/10/24  0554 02/09/24  0628 02/08/24  0504   POTASSIUM mmol/L 4.1 3.7 3.7   CHLORIDE mmol/L 105 111* 107   CO2 mmol/L 26 26 30   BUN mg/dL 27* 32* 32*   CREATININE mg/dL 0.91 1.04 1.08   EGFR ml/min/1.73sq m 69 59 56   CALCIUM mg/dL 9.5 9.7 9.4     Results from last 7 days   Lab Units 02/10/24  0554 02/09/24  0832 02/08/24  0504   WBC Thousand/uL 8.71 6.63 8.40   HEMOGLOBIN g/dL 11.3* 11.4* 11.5   PLATELETS Thousands/uL 137* 157 169     Results from last 7 days   Lab Units 02/09/24  1203   SPUTUM CULTURE  4+ Growth of Stenotrophomonas maltophilia*   GRAM STAIN RESULT  No Epithelial cells per low power field*  No Polys*  4+ Gram negative rods*  Rare Gram positive cocci in clusters*       Imaging Studies:   I have personally reviewed pertinent imaging study reports and images in PACS.  2/9 CXR reviewed personally.  Bilateral infiltrates.  2/9 chest CT reviewed personally.  Stable extensive multifocal bilateral groundglass opacities, similar to 2/3 CT.    EKG, Pathology, and Other Studies:   I have personally reviewed pertinent reports.

## 2024-02-12 LAB
ALBUMIN SERPL BCP-MCNC: 2.9 G/DL (ref 3.5–5)
ALP SERPL-CCNC: 72 U/L (ref 34–104)
ALT SERPL W P-5'-P-CCNC: 35 U/L (ref 7–52)
ANA SER QL IA: NEGATIVE
ANION GAP SERPL CALCULATED.3IONS-SCNC: 9 MMOL/L
AST SERPL W P-5'-P-CCNC: 22 U/L (ref 13–39)
BILIRUB SERPL-MCNC: 0.29 MG/DL (ref 0.2–1)
BUN SERPL-MCNC: 31 MG/DL (ref 5–25)
CALCIUM ALBUM COR SERPL-MCNC: 10.5 MG/DL (ref 8.3–10.1)
CALCIUM SERPL-MCNC: 9.6 MG/DL (ref 8.4–10.2)
CHLORIDE SERPL-SCNC: 105 MMOL/L (ref 96–108)
CO2 SERPL-SCNC: 23 MMOL/L (ref 21–32)
CREAT SERPL-MCNC: 0.91 MG/DL (ref 0.6–1.3)
CRP SERPL QL: 21 MG/L
ERYTHROCYTE [DISTWIDTH] IN BLOOD BY AUTOMATED COUNT: 18 % (ref 11.6–15.1)
FUNGUS SPEC CULT: NORMAL
FUNGUS SPEC CULT: NORMAL
GFR SERPL CREATININE-BSD FRML MDRD: 69 ML/MIN/1.73SQ M
GLUCOSE SERPL-MCNC: 103 MG/DL (ref 65–140)
GLUCOSE SERPL-MCNC: 115 MG/DL (ref 65–140)
GLUCOSE SERPL-MCNC: 205 MG/DL (ref 65–140)
GLUCOSE SERPL-MCNC: 85 MG/DL (ref 65–140)
GLUCOSE SERPL-MCNC: 95 MG/DL (ref 65–140)
HCT VFR BLD AUTO: 32.5 % (ref 34.8–46.1)
HGB BLD-MCNC: 10.5 G/DL (ref 11.5–15.4)
MAGNESIUM SERPL-MCNC: 2 MG/DL (ref 1.9–2.7)
MCH RBC QN AUTO: 29.5 PG (ref 26.8–34.3)
MCHC RBC AUTO-ENTMCNC: 32.3 G/DL (ref 31.4–37.4)
MCV RBC AUTO: 91 FL (ref 82–98)
MRSA NOSE QL CULT: NORMAL
MYCOBACTERIUM SPEC CULT: NORMAL
MYCOBACTERIUM SPEC CULT: NORMAL
PHOSPHATE SERPL-MCNC: 3.3 MG/DL (ref 2.7–4.5)
PLATELET # BLD AUTO: 186 THOUSANDS/UL (ref 149–390)
PMV BLD AUTO: 11 FL (ref 8.9–12.7)
POTASSIUM SERPL-SCNC: 4 MMOL/L (ref 3.5–5.3)
PROT SERPL-MCNC: 5.9 G/DL (ref 6.4–8.4)
RBC # BLD AUTO: 3.56 MILLION/UL (ref 3.81–5.12)
RHEUMATOID FACT SER QL LA: NEGATIVE
RHODAMINE-AURAMINE STN SPEC: NORMAL
RHODAMINE-AURAMINE STN SPEC: NORMAL
SODIUM SERPL-SCNC: 137 MMOL/L (ref 135–147)
WBC # BLD AUTO: 9.11 THOUSAND/UL (ref 4.31–10.16)

## 2024-02-12 PROCEDURE — 94645 CONT INHLJ TX EACH ADDL HOUR: CPT

## 2024-02-12 PROCEDURE — 85027 COMPLETE CBC AUTOMATED: CPT

## 2024-02-12 PROCEDURE — 94664 DEMO&/EVAL PT USE INHALER: CPT

## 2024-02-12 PROCEDURE — 99233 SBSQ HOSP IP/OBS HIGH 50: CPT | Performed by: INTERNAL MEDICINE

## 2024-02-12 PROCEDURE — 80053 COMPREHEN METABOLIC PANEL: CPT

## 2024-02-12 PROCEDURE — 84100 ASSAY OF PHOSPHORUS: CPT

## 2024-02-12 PROCEDURE — 94760 N-INVAS EAR/PLS OXIMETRY 1: CPT

## 2024-02-12 PROCEDURE — 83735 ASSAY OF MAGNESIUM: CPT

## 2024-02-12 PROCEDURE — 82948 REAGENT STRIP/BLOOD GLUCOSE: CPT

## 2024-02-12 PROCEDURE — 94644 CONT INHLJ TX 1ST HOUR: CPT

## 2024-02-12 PROCEDURE — 99291 CRITICAL CARE FIRST HOUR: CPT | Performed by: INTERNAL MEDICINE

## 2024-02-12 PROCEDURE — 94668 MNPJ CHEST WALL SBSQ: CPT

## 2024-02-12 PROCEDURE — 94640 AIRWAY INHALATION TREATMENT: CPT

## 2024-02-12 PROCEDURE — 86140 C-REACTIVE PROTEIN: CPT

## 2024-02-12 RX ORDER — OXYMETAZOLINE HYDROCHLORIDE 0.05 G/100ML
2 SPRAY NASAL ONCE
Status: COMPLETED | OUTPATIENT
Start: 2024-02-12 | End: 2024-02-12

## 2024-02-12 RX ORDER — NYSTATIN 100000 [USP'U]/G
POWDER TOPICAL 2 TIMES DAILY
Status: DISCONTINUED | OUTPATIENT
Start: 2024-02-12 | End: 2024-04-18

## 2024-02-12 RX ADMIN — INSULIN LISPRO 2 UNITS: 100 INJECTION, SOLUTION INTRAVENOUS; SUBCUTANEOUS at 17:32

## 2024-02-12 RX ADMIN — LAMOTRIGINE 150 MG: 100 TABLET ORAL at 08:51

## 2024-02-12 RX ADMIN — SODIUM CHLORIDE 250 MG: 0.9 INJECTION, SOLUTION INTRAVENOUS at 08:57

## 2024-02-12 RX ADMIN — LAMOTRIGINE 150 MG: 100 TABLET ORAL at 22:56

## 2024-02-12 RX ADMIN — MELATONIN 6 MG: at 22:57

## 2024-02-12 RX ADMIN — VENLAFAXINE 25 MG: 25 TABLET ORAL at 17:33

## 2024-02-12 RX ADMIN — SULFAMETHOXAZOLE AND TRIMETHOPRIM 2.5 TABLET: 800; 160 TABLET ORAL at 22:57

## 2024-02-12 RX ADMIN — VENLAFAXINE 25 MG: 25 TABLET ORAL at 12:38

## 2024-02-12 RX ADMIN — INSULIN LISPRO 1 UNITS: 100 INJECTION, SOLUTION INTRAVENOUS; SUBCUTANEOUS at 17:31

## 2024-02-12 RX ADMIN — QUETIAPINE FUMARATE 12.5 MG: 25 TABLET ORAL at 22:57

## 2024-02-12 RX ADMIN — NYSTATIN: 100000 POWDER TOPICAL at 13:10

## 2024-02-12 RX ADMIN — SODIUM CHLORIDE 250 MG: 0.9 INJECTION, SOLUTION INTRAVENOUS at 23:05

## 2024-02-12 RX ADMIN — OXYMETAZOLINE HYDROCHLORIDE 2 SPRAY: 0.05 SPRAY NASAL at 23:04

## 2024-02-12 RX ADMIN — ENOXAPARIN SODIUM 40 MG: 40 INJECTION SUBCUTANEOUS at 08:45

## 2024-02-12 RX ADMIN — SULFAMETHOXAZOLE AND TRIMETHOPRIM 2.5 TABLET: 800; 160 TABLET ORAL at 17:32

## 2024-02-12 RX ADMIN — EPOPROSTENOL 50 NG/KG/MIN: 1.5 INJECTION, POWDER, LYOPHILIZED, FOR SOLUTION INTRAVENOUS at 11:33

## 2024-02-12 RX ADMIN — Medication 27.5 G: at 12:32

## 2024-02-12 RX ADMIN — NYSTATIN 500000 UNITS: 100000 SUSPENSION ORAL at 06:09

## 2024-02-12 RX ADMIN — EPOPROSTENOL 50 NG/KG/MIN: 1.5 INJECTION, POWDER, LYOPHILIZED, FOR SOLUTION INTRAVENOUS at 19:24

## 2024-02-12 RX ADMIN — INSULIN LISPRO 2 UNITS: 100 INJECTION, SOLUTION INTRAVENOUS; SUBCUTANEOUS at 12:37

## 2024-02-12 RX ADMIN — TOPIRAMATE 100 MG: 100 TABLET, FILM COATED ORAL at 17:35

## 2024-02-12 RX ADMIN — Medication 5 DROP: at 17:37

## 2024-02-12 RX ADMIN — POLYETHYLENE GLYCOL 3350 17 G: 17 POWDER, FOR SOLUTION ORAL at 08:45

## 2024-02-12 RX ADMIN — VENLAFAXINE 25 MG: 25 TABLET ORAL at 08:53

## 2024-02-12 RX ADMIN — EPOPROSTENOL 50 NG/KG/MIN: 1.5 INJECTION, POWDER, LYOPHILIZED, FOR SOLUTION INTRAVENOUS at 02:12

## 2024-02-12 RX ADMIN — TOPIRAMATE 100 MG: 100 TABLET, FILM COATED ORAL at 08:54

## 2024-02-12 RX ADMIN — NYSTATIN: 100000 POWDER TOPICAL at 17:35

## 2024-02-12 RX ADMIN — SULFAMETHOXAZOLE AND TRIMETHOPRIM 2.5 TABLET: 800; 160 TABLET ORAL at 08:52

## 2024-02-12 RX ADMIN — NYSTATIN 500000 UNITS: 100000 SUSPENSION ORAL at 10:24

## 2024-02-12 NOTE — PLAN OF CARE
Problem: Prexisting or High Potential for Compromised Skin Integrity  Goal: Skin integrity is maintained or improved  Description: INTERVENTIONS:  - Identify patients at risk for skin breakdown  - Assess and monitor skin integrity  - Assess and monitor nutrition and hydration status  - Monitor labs   - Assess for incontinence   - Turn and reposition patient  - Assist with mobility/ambulation  - Relieve pressure over bony prominences  - Avoid friction and shearing  - Provide appropriate hygiene as needed including keeping skin clean and dry  - Evaluate need for skin moisturizer/barrier cream  - Collaborate with interdisciplinary team   - Patient/family teaching  - Consider wound care consult   Outcome: Progressing     Problem: INFECTION - ADULT  Goal: Absence or prevention of progression during hospitalization  Description: INTERVENTIONS:  - Assess and monitor for signs and symptoms of infection  - Monitor lab/diagnostic results  - Monitor all insertion sites, i.e. indwelling lines, tubes, and drains  - Monitor endotracheal if appropriate and nasal secretions for changes in amount and color  - [place] appropriate cooling/warming therapies per order  - Administer medications as ordered  - Instruct and encourage patient and family to use good hand hygiene technique  - Identify and instruct in appropriate isolation precautions for identified infection/condition  Outcome: Progressing     Problem: SAFETY ADULT  Goal: Patient will remain free of falls  Description: INTERVENTIONS:  - Educate patient/family on patient safety including physical limitations  - Instruct patient to call for assistance with activity   - Consult OT/PT to assist with strengthening/mobility   - Keep Call bell within reach  - Keep bed low and locked with side rails adjusted as appropriate  - Keep care items and personal belongings within reach  - Initiate and maintain comfort rounds  - Make Fall Risk Sign visible to staff  - Offer Toileting every  2 Hours, in advance of need  - Initiate/Maintain bed alarm  - Obtain necessary fall risk management equipment: non skid footwear  - Apply yellow socks and bracelet for high fall risk patients  - Consider moving patient to room near nurses station  Outcome: Progressing     Problem: RESPIRATORY - ADULT  Goal: Achieves optimal ventilation and oxygenation  Description: INTERVENTIONS:  - Assess for changes in respiratory status  - Assess for changes in mentation and behavior  - Position to facilitate oxygenation and minimize respiratory effort  - Oxygen administered by appropriate delivery if ordered  - Initiate smoking cessation education as indicated  - Encourage broncho-pulmonary hygiene including cough, deep breathe, Incentive Spirometry  - Assess the need for suctioning and aspirate as needed  - Assess and instruct to report SOB or any respiratory difficulty  - Respiratory Therapy support as indicated  Outcome: Progressing     Problem: SKIN/TISSUE INTEGRITY - ADULT  Goal: Skin Integrity remains intact(Skin Breakdown Prevention)  Description: Assess:  -Perform Flavio assessment every shift   -Clean and moisturize skin every day   -Inspect skin when repositioning, toileting, and assisting with ADLS  -Assess under medical devices such as ronny  every hour   -Assess extremities for adequate circulation and sensation     Bed Management:  -Have minimal linens on bed & keep smooth, unwrinkled  -Change linens as needed when moist or perspiring  -Avoid sitting or lying in one position for more than 2 hours while in bed  -Keep HOB at 45 degrees     Toileting:  -Offer bedside commode  -Assess for incontinence every hour  -Use incontinent care products after each incontinent episode such as moisture barrier cream     Activity:  -Mobilize patient 3 times a day  -Encourage activity and walks on unit  -Encourage or provide ROM exercises   -Turn and reposition patient every 2 Hours  -Use appropriate equipment to lift or move  patient in bed  -Instruct/ Assist with weight shifting every hour when out of bed in chair  -Consider limitation of chair time 2 hour intervals    Skin Care:  -Avoid use of baby powder, tape, friction and shearing, hot water or constrictive clothing  -Relieve pressure over bony prominences using allevyn   -Do not massage red bony areas    Next Steps:  -Teach patient strategies to minimize risks such as weight shifting    -Consider consults to  interdisciplinary teams such as PT  Outcome: Progressing     Problem: Potential for Falls  Goal: Patient will remain free of falls  Description: INTERVENTIONS:  - Educate patient/family on patient safety including physical limitations  - Instruct patient to call for assistance with activity   - Consult OT/PT to assist with strengthening/mobility   - Keep Call bell within reach  - Keep bed low and locked with side rails adjusted as appropriate  - Keep care items and personal belongings within reach  - Initiate and maintain comfort rounds  - Make Fall Risk Sign visible to staff  - Offer Toileting every 2 Hours, in advance of need  - Initiate/Maintain bed alarm  - Obtain necessary fall risk management equipment: non skid footwear  - Apply yellow socks and bracelet for high fall risk patients  - Consider moving patient to room near nurses station  Outcome: Progressing

## 2024-02-12 NOTE — CASE MANAGEMENT
Case Management Progress Note    Patient name Carla Hunt  Location MICU 10/MICU 10 MRN [id number]  : 1965 Date 2024       LOS (days): 33  Geometric Mean LOS (GMLOS) (days):   Days to GMLOS:        OBJECTIVE:        Current admission status: Inpatient  Preferred Pharmacy:   [id number] [place] - [address] [place] [id number]  Phone: [phone] Fax: [phone]    [facility] - [address] [address]  Phone: [phone] Fax: [phone]    Primary Care Provider: Tony Guevara MD    Primary Insurance: [employer]  Secondary Insurance: MEDICARE    PROGRESS NOTE: CM completed chart review pt not medically cleared for discharge.  CM will continue to follow.

## 2024-02-12 NOTE — PROGRESS NOTES
Progress Note - Infectious Disease   Carla Hunt 59 y.o. female MRN: [id number]  [facility] [id number]      Impression/Recommendations:  Recurrent acute hypoxic respiratory failure.  Initially, on admission early January, secondary to mostly COVID but there was concern for concurrent bacterial pneumonia on admission due to elevated procalcitonin. Patient completed treatment course for both COVID and bacterial pneumonia.  She was clinically improved with decreasing O2 requirement.  However, patient deteriorated in late January, requiring to be placed back on high flow O2 support.  Chest CT more suggestive of COVID and postviral bacterial pneumonia.  Procalcitonin x 3 were in the normal range.  Patient has no fever or leukocytosis.  She improved with increasing steroid dose.  She completed a 5-day course of Bactrim/minocycline for presumptive stenotrophomonas respiratory infection, with expectorated sputum growing stenotrophomonas.  She subsequently had bronchoscopy, with BAL culture negative for bacterial growth, AFB and PCP but completed the course of Bactrim/minocycline prior to bronchoscopy.  Patient improved and O2 was being weaned again until past weekend, when she deteriorated again.  She is now back in ICU.  Expectorated sputum once again is growing stenotrophomonas.  Repeat chest CT showed stable post-COVID changes, without consolidation.  Not sure that the stenotrophomonas that is growing again and expectorated sputum is pathogenic versus upper airway colonization.  Patient is now back on high-dose steroid and Bactrim.  Respiratory status overall stable.  If patient does not improve with current treatment regimen, she may benefit from another bronchoscopy  Continue high-dose Bactrim.    Continue high-dose systemic corticosteroid.  Monitor temperature/WBC.  Monitor respiratory symptoms.     Severe COVID, present on admission.  Patient was treated with remdesivir, dexamethasone and  was given 1 dose of Tocilizumab on admission.  Current chest CT is showing extensive fibrotic changes.  No additional antiviral needed.     CKD.  Creatinine improved.  Monitor creatinine.     Encephalopathy on admission.  This has resolved.  There was concern for possible seizure but no witnessed seizure and EEG did not capture it.  Monitor mental status.     Seizure disorder, status post temporal lobe resection in .     B-cell lymphoma, on chemotherapy, which is currently postponed.  Patient was leukopenic on admission but WBC now is near normal range.  Monitor WBC/ANC.     Discussed with patient and her  in detail regarding the above plan.     Antibiotics:  Bactrim # 3     Subjective:  Patient's respiratory status is overall stable.  She remains in ICU, on high flow O2 support.  Temperature stays down.  No chills.  She is tolerating antibiotic well.  No nausea, vomiting or diarrhea.     Objective:  Vitals:  Temp:  [97.8 °F (36.6 °C)-98.2 °F (36.8 °C)] 98.2 °F (36.8 °C)  HR:  [] 96  Resp:  [20-29] 24  BP: ()/(50-64) 96/51  SpO2:  [83 %-100 %] 91 %  Temp (24hrs), Av.1 °F (36.7 °C), Min:97.8 °F (36.6 °C), Max:98.2 °F (36.8 °C)  Current: Temperature: 98.2 °F (36.8 °C)    Physical Exam:     General: Awake, alert, cooperative, no distress.   Neck:  Supple. No mass.  No lymphadenopathy.   Lungs: Decreased breath sounds, mild basilar rhonchi rales, no wheezing, respirations remains labored.   Heart:  Tachycardic with regular rhythm, S1 and S2 normal, no murmur.   Abdomen: Soft, nondistended, non-tender, bowel sounds active all four quadrants, no masses, no organomegaly.   Extremities: Trace leg edema. No erythema/warmth. No ulcer. Nontender to palpation.   Skin:  No rash.   Neuro: Moves all extremities.     Invasive Devices       Peripheral Intravenous Line  Duration             Peripheral IV 24 Left;Upper;Ventral (anterior) Arm 3 days    Peripheral IV 02/10/24 Right;Upper;Ventral  (anterior) Arm 2 days              Drain  Duration             External Urinary Catheter <1 day                    Labs studies:   I have personally reviewed pertinent labs.  Results from last 7 days   Lab Units 02/12/24 0447 02/11/24  0450 02/10/24  0554   POTASSIUM mmol/L 4.0 4.2 4.1   CHLORIDE mmol/L 105 105 105   CO2 mmol/L 23 26 26   BUN mg/dL 31* 31* 27*   CREATININE mg/dL 0.91 0.88 0.91   EGFR ml/min/1.73sq m 69 72 69   CALCIUM mg/dL 9.6 9.5 9.5   AST U/L 22  --   --    ALT U/L 35  --   --    ALK PHOS U/L 72  --   --      Results from last 7 days   Lab Units 02/12/24 0447 02/11/24  0450 02/10/24  0554   WBC Thousand/uL 9.11 8.24 8.71   HEMOGLOBIN g/dL 10.5* 10.8* 11.3*   PLATELETS Thousands/uL 186 165 137*     Results from last 7 days   Lab Units 02/10/24  1516 02/09/24  1203   SPUTUM CULTURE   --  4+ Growth of Stenotrophomonas maltophilia*  2+ Growth of   GRAM STAIN RESULT   --  No Epithelial cells per low power field*  No Polys*  4+ Gram negative rods*  Rare Gram positive cocci in clusters*   MRSA CULTURE ONLY  No Methicillin Resistant Staphlyococcus aureus (MRSA) isolated  --        Imaging Studies:   I have personally reviewed pertinent imaging study reports and images in PACS.    EKG, Pathology, and Other Studies:   I have personally reviewed pertinent reports.

## 2024-02-12 NOTE — UTILIZATION REVIEW
Continued Stay Review    Date: 02/11                     Current Patient Class: IP  Current Level of Care: Level 2 stepdown/HOT    HPI:59 y.o. female initially admitted on 01/10     Assessment/Plan: Pt continues to have worsening oxygen requirements. Currently SPO2 94% on HFNT 60 lpm FiO2 1.0 with NRB. CRP is now 31 today from 1.0 on 2/8. This is a response to stopping the pulse steroid and going to 80 mg. Steroids increased to get 500 mg solumedrol today- would recommend 250 mg q12 tomorrow to continue this dose and will add 5 days of IVIG 400 mg daily. Add Veletri for now. Trial of lasix today. Self prone if tolerated    Vital Signs:   Date/Time Temp Pulse Resp BP MAP (mmHg) SpO2 FiO2 (%) Calculated FIO2 (%) - Nasal Cannula O2 Flow Rate (L/min) Nasal Cannula O2 Flow Rate (L/min) O2 Device O2 Interface Device Patient Position - Orthostatic VS   02/12/24 1500 -- 83 24 Abnormal  121/61 85 97 % -- -- -- -- -- -- --   02/12/24 1400 -- 89 22 110/58 78 96 % -- -- -- -- -- -- --   02/12/24 1359 -- -- -- -- -- -- -- -- -- -- -- HFNC prongs --   02/12/24 1240 -- 87 23 Abnormal  -- -- 95 % -- -- -- -- -- -- --   02/12/24 1200 98 °F (36.7 °C) 94 25 Abnormal  105/58 74 92 % -- -- -- -- -- -- --   02/12/24 1130 -- 97 24 Abnormal  -- -- 93 % -- -- -- -- -- -- --   02/12/24 1100 -- 90 23 Abnormal  99/57 75 96 % -- -- -- -- -- -- --   02/12/24 1030 -- 96 24 Abnormal  -- -- 91 % -- -- -- -- -- -- --   02/12/24 1000 -- 93 21 96/51 67 95 % -- -- -- -- -- -- --   02/12/24 0900 98.2 °F (36.8 °C) 103 26 Abnormal  94/50 68 83 % Abnormal  100 -- 55 L/min -- High flow nasal cannula  -- --   O2 Device: with prn % at 02/12/24 0900 02/12/24 0830 -- 109 Abnormal  22 -- -- 91 % -- -- -- -- -- -- --   02/12/24 0805 -- 92 22 -- -- 88 % Abnormal  -- -- -- -- -- -- --   02/12/24 0800 -- 81 21 104/59 79 89 % Abnormal  -- -- -- -- -- -- --   02/12/24 0744 -- 77 20 -- -- 95 % 75 -- 40 L/min -- High flow nasal cannula -- --   02/12/24 0730 --  75 22 -- -- 96 % -- -- -- -- -- -- --   02/12/24 0716 -- -- -- -- -- -- -- -- -- -- -- HFNC prongs --   02/12/24 0700 -- 74 20 102/53 75 93 % -- -- -- -- -- -- --   02/12/24 0600 -- 78 21 106/52 75 91 % -- -- -- -- -- -- --   02/12/24 0500 -- 79 21 104/58 78 97 % -- -- -- -- -- -- --   02/12/24 0400 98.2 °F (36.8 °C) 76 20 98/53 70 97 % 70 220 -- 50 L/min High flow nasal cannula -- Lying   02/12/24 0300 -- 75 20 100/53 73 97 % -- -- -- -- -- -- --   02/12/24 0207 -- -- -- -- -- -- 70 220 -- 50 L/min High flow nasal cannula HFNC prongs --   02/12/24 0200 -- 78 22 86/51 Abnormal  63 Abnormal  98 % -- -- -- -- -- -- --   02/12/24 0100 -- 83 24 Abnormal  89/55 Abnormal  67 96 % -- -- -- -- -- -- --   02/12/24 0000 98.2 °F (36.8 °C) 80 23 Abnormal  90/55 68 98 % -- -- -- -- -- -- --   02/11/24 2300 -- 85 23 Abnormal  83/52 Abnormal  63 Abnormal  95 % -- -- -- -- -- -- --   02/11/24 2200 -- 88 23 Abnormal  120/61 86 95 % -- -- -- -- -- -- --   02/11/24 2100 -- 73 23 Abnormal  114/64 84 98 % -- -- -- -- -- -- --   02/11/24 2000 -- 80 22 109/56 76 93 % -- -- -- -- -- -- --   02/11/24 1938 -- -- -- -- -- -- 70 220 -- 50 L/min High flow nasal cannula HFNC prongs --   02/11/24 1900 -- 92 25 Abnormal  99/56 75 92 % -- -- -- -- -- -- --   02/11/24 1800 -- 98 29 Abnormal  114/58 81 91 % -- -- -- -- -- -- --   02/11/24 1700 -- 76 21 116/61 84 96 % 70 -- -- -- High flow nasal cannula -- --   02/11/24 1600 -- 75 20 105/58 78 96 % 60 -- -- -- High flow nasal cannula -- --   02/11/24 1542 -- -- -- -- -- -- 50 -- 40 L/min -- High flow nasal cannula -- --   02/11/24 1500 -- 81 22 107/56 78 94 % -- -- -- -- -- -- --   02/11/24 1458 -- -- -- -- -- -- -- -- -- -- High flow nasal cannula HFNC prongs --   02/11/24 1452 -- -- -- -- -- -- 65 -- 40 L/min -- High flow nasal cannula -- --   02/11/24 1400 -- 75 20 110/57 80 100 % -- -- -- -- -- -- --   02/11/24 1300 97.8 °F (36.6 °C) 91 26 Abnormal  104/55 76 94 % -- -- -- -- -- -- --   02/11/24  1200 -- 103 29 Abnormal  127/58 84 91 % 100 -- 45 L/min -- High flow nasal cannula -- --   02/11/24 1125 -- -- -- -- -- -- 100 -- 45 L/min -- High flow nasal cannula -- --   02/11/24 1100 -- 110 Abnormal  26 Abnormal  128/69 93 85 % Abnormal  -- -- -- -- -- -- --   02/11/24 1000 -- 100 23 Abnormal  129/70 92 98 % -- -- -- -- -- -- --   02/11/24 0900 -- 96 22 108/56 79 88 % Abnormal  -- -- -- -- -- -- --   02/11/24 0800 98 °F (36.7 °C) 74 20 122/68 91 95 % -- -- -- -- -- -- --   02/11/24 0747 -- -- -- -- -- -- -- -- -- -- -- HFNC prongs --   02/11/24 0700 -- 68 21 118/56 81 91 % -- -- -- -- -- -- --   02/11/24 0600 -- 75 22 119/59 85 93 % 70 220 -- 50 L/min -- --      Pertinent Labs/Diagnostic Results:   02/11 CXR:No definite change with persistent extensive bilateral groundglass opacity.         Results from last 7 days   Lab Units 02/12/24  0447 02/11/24  0450 02/10/24  0554 02/09/24  0832 02/08/24  0504 02/06/24  0454   WBC Thousand/uL 9.11 8.24 8.71 6.63 8.40 5.07   HEMOGLOBIN g/dL 10.5* 10.8* 11.3* 11.4* 11.5 11.5   HEMATOCRIT % 32.5* 34.4* 35.0 36.0 35.8 36.3   PLATELETS Thousands/uL 186 165 137* 157 169 162   BANDS PCT %  --   --   --  8  --  9*         Results from last 7 days   Lab Units 02/12/24 0447 02/11/24  0450 02/10/24  0554 02/09/24  0628 02/08/24  0504 02/06/24  0454   SODIUM mmol/L 137 139 141 147 143 143   POTASSIUM mmol/L 4.0 4.2 4.1 3.7 3.7 4.3   CHLORIDE mmol/L 105 105 105 111* 107 112*   CO2 mmol/L 23 26 26 26 30 23   ANION GAP mmol/L 9 8 10 10 6 8   BUN mg/dL 31* 31* 27* 32* 32* 29*   CREATININE mg/dL 0.91 0.88 0.91 1.04 1.08 0.99   EGFR ml/min/1.73sq m 69 72 69 59 56 63   CALCIUM mg/dL 9.6 9.5 9.5 9.7 9.4 9.5   MAGNESIUM mg/dL 2.0 2.1  --   --   --  1.9   PHOSPHORUS mg/dL 3.3 3.1  --   --   --  3.7     Results from last 7 days   Lab Units 02/12/24  0447   AST U/L 22   ALT U/L 35   ALK PHOS U/L 72   TOTAL PROTEIN g/dL 5.9*   ALBUMIN g/dL 2.9*   TOTAL BILIRUBIN mg/dL 0.29     Results from last  7 days   Lab Units 02/12/24  1126 02/12/24  0758 02/11/24  2150 02/11/24  1632 02/11/24  1145 02/11/24  0820 02/11/24  0622 02/10/24  2128 02/10/24  1704 02/10/24  1136 02/10/24  0748 02/09/24  2113   POC GLUCOSE mg/dl 115 85 270* 191* 164* 151* 153* 268* 253* 195* 141* 181*     Results from last 7 days   Lab Units 02/12/24  0447 02/11/24  0450 02/10/24  0554 02/09/24  0628 02/08/24  0504 02/06/24  0454   GLUCOSE RANDOM mg/dL 95 135 176* 100 122 158*         Results from last 7 days   Lab Units 02/08/24  0504   PROCALCITONIN ng/ml 0.06                 Results from last 7 days   Lab Units 02/11/24  0450 02/09/24  1717   BNP pg/mL 36 75                         Results from last 7 days   Lab Units 02/12/24  0447 02/11/24  0450 02/08/24  0504   CRP mg/L 21.0* 31.6* 1.0             Results from last 7 days   Lab Units 02/09/24  1203   SPUTUM CULTURE  4+ Growth of Stenotrophomonas maltophilia*  2+ Growth of   GRAM STAIN RESULT  No Epithelial cells per low power field*  No Polys*  4+ Gram negative rods*  Rare Gram positive cocci in clusters*                   Medications:   Scheduled Medications:  carbamide peroxide, 5 drop, Both Ears, BID  enoxaparin, 40 mg, Subcutaneous, Daily  immune globulin, human, 400 mg/kg (Adjusted), Intravenous, Daily  insulin lispro, 1-5 Units, Subcutaneous, 4x Daily (AC & HS)  insulin lispro, 2 Units, Subcutaneous, TID With Meals  lamoTRIgine, 150 mg, Oral, BID  melatonin, 6 mg, Oral, HS  methylPREDNISolone sodium succinate, 250 mg, Intravenous, Q12H SARTHAK  nystatin, , Topical, BID  polyethylene glycol, 17 g, Oral, Daily  QUEtiapine, 12.5 mg, Oral, HS  sulfamethoxazole-trimethoprim, 5 mg/kg of trimethoprim, Oral, TID  topiramate, 100 mg, Oral, BID  venlafaxine, 25 mg, Oral, TID With Meals      Continuous IV Infusions:  epoprostenol, 6.25-50 ng/kg/min (Ideal), Inhalation, Titrated      PRN Meds:  acetaminophen, 650 mg, Oral, Q6H PRN  bisacodyl, 10 mg, Rectal, Daily PRN  calcium carbonate, 500  mg, Oral, BID PRN  metoprolol, 2.5 mg, Intravenous, Q6H PRN  ondansetron, 4 mg, Intravenous, Q6H PRN  senna-docusate sodium, 2 tablet, Oral, BID PRN  sodium chloride, 1 spray, Each Nare, Q1H PRN        Discharge Plan: D    Network Utilization Review Department  ATTENTION: Please call with any questions or concerns to [phone] and carefully listen to the prompts so that you are directed to the right person. All voicemails are confidential.   For Discharge needs, contact Care Management DC Support Team at [phone] opt. [phone]  Send all requests for admission clinical reviews, approved or denied determinations and any other requests to dedicated fax number below belonging to the campus where the patient is receiving treatment. List of dedicated fax numbers for the Facilities:  FACILITY NAME UR FAX NUMBER   ADMISSION DENIALS (Administrative/Medical Necessity) [phone]   DISCHARGE SUPPORT TEAM (NETWORK) [phone]   PARENT CHILD HEALTH (Maternity/NICU/Pediatrics) [phone]   [facility] [phone]   [facility] [phone]   [facility] [phone]   [facility] [phone]   [facility] [phone]   [facility] [phone]   [facility] [phone]   [facility] [phone]   [facility] [phone]   [facility] [phone]   [facility] [phone]   [facility] [phone]

## 2024-02-12 NOTE — PROGRESS NOTES
[facility]  Progress Note: Critical Care  Name: Carla Hunt 59 y.o. female I MRN: [id number]  Unit/Bed#: MICU 10 I Date of Admission: 1/10/2024   Date of Service: 2/12/2024 I Hospital Day: 33    Assessment/Plan     Neuro:   Diagnosis: seizure disorder  S/p partial left temporal lobe resection in 2007  Contuinue home lamictal 150 bid and topamax 100 bid  Diagnosis: anxiety  -Continue home effexor  -Seroquel 12.5 hs and melatonin 6 hs for sleep     CV:   No active issues     Pulm:  Diagnosis: acute hypoxic respiratory failure   -Tested COVID positive on 1/7  -Completed severe COVID pathway and 7 days CTX  -Tenuous respiratory status requiring multiple re-admissions to MICU  -S/p Bronch 2/2   -NG on cultures (initially showed non-group A strep)  -PCP and AFB negative   -Legionella, viral, fungal cultures no growth to date  -Pulse dose steroids with solumedrol 1g daily x3 days (completed 2/7) then transitioned to prednisone 80mg daily on 2/8  -S/p Solumedrol 80mg IV and Lasix 40mg IV once 2/10  -2/10 increased steroids to 60mg q6h  -See remainder of plan under ID     GI:   No active issues  Bowel regimen with daily MiraLAX and twice daily senna     :   Diagnosis: CKD III  -Baseline Cr appears to be 0.8-1.2  -UA unremarkable   -Upon chart review 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) though unclear how this was diagnosed, this also does not enirely fit as she is NOT hypokalemic  -Continue to monitor I/O and UOP     F/E/N:   F: none, boluses as indicated   E: monitor and replete for goal K>4, P>3, Mg>2  N: regular house      Heme/Onc:   Diagnosis: B cell lymphoma  -[facility]s [facility] heme[facility]onc [facility]  -On rituxan for 2 year course, started May 2022  -Last dose was 12/20, treatment currently on hold   -Leukopenic on admission but WBC now wnl  DVT ppx with lovenox      Endo:   Diagnosis: steroid hyperglycemia   -SSI, hypoglycemic protocol  -Goal -180      ID:   Diagnosis: COVID pneumonia vs tracheobronchitis  -Completed severe COVID pathway with decadron (ended 1/22) and remdesivir (ended 1/20), also completed 7 days CXT on 1/15  -C/f opportunistic infections given immunocompromised status on chemotherapy and recent steroid use  -No consolidations on CXR and procal negative  -S/p bactrim and minocycline x5 days for stenotrophomonas on sputum culture (1/25), then on bactrim for PJP ppx  -Bronc on 2/2 - Cx w/o growth (initially resulted as 1+ alpha hemolytic strep), AFB & PCP negative, f/u fungal, legionella, and viral cultures   -2/10 pt again had escalating O2 requirements on floors necessitating readmission to ICU  -Concern that with recent pulse dose steroids and now worsening respiratory status she could have infection, possibly opportunistic   -Repeat sputum culture 2/9 with 4+ stenotrophomonas  -In discussion with ID will use high dose bactrim         MSK/Skin:   No active issues      Disposition: Critical care    ICU Core Measures     A: Assess, Prevent, and Manage Pain Has pain been assessed? Yes  Need for changes to pain regimen? No   B: Both SAT/SAT  N/A   C: Choice of Sedation RASS Goal: 0 Alert and Calm  Need for changes to sedation or analgesia regimen? No   D: Delirium CAM-ICU: Negative   E: Early Mobility  Plan for early mobility? Yes   F: Family Engagement Plan for family engagement today? Yes       Antibiotic Review: Patient on appropriate coverage based on culture data.     Review of Invasive Devices:            Prophylaxis:  VTE VTE covered by:  enoxaparin, Subcutaneous, 40 mg at 02/12/24 0845       Stress Ulcer  not ordered        Significant 24hr Events     24hr events: No events overnight.      Subjective   Patient seen lying in bed, reports feeling well today. Plan to discuss updates with patients  today.     Review of Systems   Constitutional:  Negative for chills and fever.   Respiratory:  Negative for cough and chest tightness.     Cardiovascular:  Negative for chest pain.   Gastrointestinal:  Negative for abdominal pain, nausea and vomiting.   Neurological:  Negative for dizziness and headaches.        Objective                            Vitals I/O      Most Recent Min/Max in 24hrs   Temp 98.2 °F (36.8 °C) Temp  Min: 97.8 °F (36.6 °C)  Max: 98.2 °F (36.8 °C)   Pulse 103 Pulse  Min: 73  Max: 110   Resp (!) 26 Resp  Min: 20  Max: 29   BP 94/50 BP  Min: 83/52  Max: 129/70   O2 Sat (!) 83 % SpO2  Min: 83 %  Max: 100 %      Intake/Output Summary (Last 24 hours) at 2/12/2024 0922  Last data filed at 2/12/2024 0400  Gross per 24 hour   Intake 1410 ml   Output 1900 ml   Net -490 ml       Diet Regular; Regular House    Invasive Monitoring   Arterial Line  Amelia BP    No data recorded   MAP    No data recorded           Physical Exam   Physical Exam  Vitals reviewed.   Skin:     General: Skin is warm.      Capillary Refill: Capillary refill takes less than 2 seconds.   HENT:      Head: Normocephalic.   Cardiovascular:      Rate and Rhythm: Normal rate.      Pulses: Normal pulses.   Abdominal: General: Bowel sounds are normal. There is no distension.      Palpations: Abdomen is soft.   Constitutional:       Appearance: She is normal weight.      Comments: Seen lying in bed.    Pulmonary:      Effort: Pulmonary effort is normal.      Comments: On high flow nasal cannula  Neurological:      Mental Status: She is alert and oriented to person, place and time.      Comments: Responds to verbal stimuli, able to move extremities spontaneously.             Diagnostic Studies      EKG: Reviewed  Imaging: Reviewed I have personally reviewed pertinent reports.       Medications:  Scheduled PRN   enoxaparin, 40 mg, Daily  immune globulin, human, 400 mg/kg (Adjusted), Daily  insulin lispro, 1-5 Units, 4x Daily (AC & HS)  insulin lispro, 2 Units, TID With Meals  lamoTRIgine, 150 mg, BID  melatonin, 6 mg, HS  methylPREDNISolone sodium succinate, 250 mg, Q12H  SARTHAK  nystatin, 500,000 Units, 4x Daily  polyethylene glycol, 17 g, Daily  QUEtiapine, 12.5 mg, HS  sulfamethoxazole-trimethoprim, 5 mg/kg of trimethoprim, TID  topiramate, 100 mg, BID  venlafaxine, 25 mg, TID With Meals      acetaminophen, 650 mg, Q6H PRN  bisacodyl, 10 mg, Daily PRN  calcium carbonate, 500 mg, BID PRN  metoprolol, 2.5 mg, Q6H PRN  ondansetron, 4 mg, Q6H PRN  senna-docusate sodium, 2 tablet, BID PRN  sodium chloride, 1 spray, Q1H PRN       Continuous    epoprostenol, 6.25-50 ng/kg/min (Ideal), Last Rate: 50 ng/kg/min (02/12/24 0212)         Labs:    CBC    Recent Labs     02/11/24 0450 02/12/24 0447   WBC 8.24 9.11   HGB 10.8* 10.5*   HCT 34.4* 32.5*    186     BMP    Recent Labs     02/11/24 0450 02/12/24 0447   SODIUM 139 137   K 4.2 4.0    105   CO2 26 23   AGAP 8 9   BUN 31* 31*   CREATININE 0.88 0.91   CALCIUM 9.5 9.6       Coags    No recent results     Additional Electrolytes  Recent Labs     02/11/24 0450 02/12/24 0447   MG 2.1 2.0   PHOS 3.1 3.3          Blood Gas    No recent results  No recent results LFTs  Recent Labs     02/12/24 0447   ALT 35   AST 22   ALKPHOS 72   ALB 2.9*   TBILI 0.29       Infectious  No recent results  Glucose  Recent Labs     02/11/24 0450 02/12/24 0447   GLUC 135 95             Miguel Whittaker MD

## 2024-02-13 ENCOUNTER — APPOINTMENT (INPATIENT)
Dept: RADIOLOGY | Facility: HOSPITAL | Age: 59
DRG: 003 | End: 2024-02-13
Payer: COMMERCIAL

## 2024-02-13 LAB
ANION GAP SERPL CALCULATED.3IONS-SCNC: 6 MMOL/L
ARTERIAL PATENCY WRIST A: YES
BASE EXCESS BLDA CALC-SCNC: -4.7 MMOL/L
BASOPHILS # BLD MANUAL: 0 THOUSAND/UL (ref 0–0.1)
BASOPHILS NFR MAR MANUAL: 0 % (ref 0–1)
BUN SERPL-MCNC: 31 MG/DL (ref 5–25)
CALCIUM SERPL-MCNC: 9.1 MG/DL (ref 8.4–10.2)
CCP AB SER IA-ACNC: 0.8
CHLORIDE SERPL-SCNC: 106 MMOL/L (ref 96–108)
CO2 SERPL-SCNC: 23 MMOL/L (ref 21–32)
CREAT SERPL-MCNC: 0.95 MG/DL (ref 0.6–1.3)
DACRYOCYTES BLD QL SMEAR: PRESENT
EOSINOPHIL # BLD MANUAL: 0 THOUSAND/UL (ref 0–0.4)
EOSINOPHIL NFR BLD MANUAL: 0 % (ref 0–6)
ERYTHROCYTE [DISTWIDTH] IN BLOOD BY AUTOMATED COUNT: 17.8 % (ref 11.6–15.1)
GFR SERPL CREATININE-BSD FRML MDRD: 66 ML/MIN/1.73SQ M
GLUCOSE SERPL-MCNC: 125 MG/DL (ref 65–140)
GLUCOSE SERPL-MCNC: 131 MG/DL (ref 65–140)
GLUCOSE SERPL-MCNC: 155 MG/DL (ref 65–140)
GLUCOSE SERPL-MCNC: 160 MG/DL (ref 65–140)
HCO3 BLDA-SCNC: 20.2 MMOL/L (ref 22–28)
HCT VFR BLD AUTO: 31.3 % (ref 34.8–46.1)
HGB BLD-MCNC: 10.1 G/DL (ref 11.5–15.4)
HOROWITZ INDEX BLDA+IHG-RTO: 80 MM[HG]
LYMPHOCYTES # BLD AUTO: 0 % (ref 14–44)
LYMPHOCYTES # BLD AUTO: 0 THOUSAND/UL (ref 0.6–4.47)
MAGNESIUM SERPL-MCNC: 2.1 MG/DL (ref 1.9–2.7)
MCH RBC QN AUTO: 29.8 PG (ref 26.8–34.3)
MCHC RBC AUTO-ENTMCNC: 32.3 G/DL (ref 31.4–37.4)
MCV RBC AUTO: 92 FL (ref 82–98)
MONOCYTES # BLD AUTO: 0.15 THOUSAND/UL (ref 0–1.22)
MONOCYTES NFR BLD: 2 % (ref 4–12)
NEUTROPHILS # BLD MANUAL: 7.14 THOUSAND/UL (ref 1.85–7.62)
NEUTS BAND NFR BLD MANUAL: 3 % (ref 0–8)
NEUTS SEG NFR BLD AUTO: 95 % (ref 43–75)
O2 CT BLDA-SCNC: 15.1 ML/DL (ref 16–23)
OVALOCYTES BLD QL SMEAR: PRESENT
OXYHGB MFR BLDA: 96.3 % (ref 94–97)
PCO2 BLDA: 36.7 MM HG (ref 36–44)
PEEP RESPIRATORY: 8 CM[H2O]
PH BLDA: 7.36 [PH] (ref 7.35–7.45)
PLATELET # BLD AUTO: 173 THOUSANDS/UL (ref 149–390)
PLATELET BLD QL SMEAR: ADEQUATE
PMV BLD AUTO: 10.9 FL (ref 8.9–12.7)
PO2 BLDA: 104 MM HG (ref 75–129)
POIKILOCYTOSIS BLD QL SMEAR: PRESENT
POLYCHROMASIA BLD QL SMEAR: PRESENT
POTASSIUM SERPL-SCNC: 5 MMOL/L (ref 3.5–5.3)
PRESSURE CONTROL: 10
RBC # BLD AUTO: 3.39 MILLION/UL (ref 3.81–5.12)
RBC MORPH BLD: PRESENT
SODIUM SERPL-SCNC: 135 MMOL/L (ref 135–147)
SPECIMEN SOURCE: ABNORMAL
VENT AC: 14
VENT- AC: AC
WBC # BLD AUTO: 7.29 THOUSAND/UL (ref 4.31–10.16)

## 2024-02-13 PROCEDURE — 85027 COMPLETE CBC AUTOMATED: CPT | Performed by: STUDENT IN AN ORGANIZED HEALTH CARE EDUCATION/TRAINING PROGRAM

## 2024-02-13 PROCEDURE — 83735 ASSAY OF MAGNESIUM: CPT | Performed by: STUDENT IN AN ORGANIZED HEALTH CARE EDUCATION/TRAINING PROGRAM

## 2024-02-13 PROCEDURE — 99291 CRITICAL CARE FIRST HOUR: CPT | Performed by: INTERNAL MEDICINE

## 2024-02-13 PROCEDURE — 82805 BLOOD GASES W/O2 SATURATION: CPT | Performed by: STUDENT IN AN ORGANIZED HEALTH CARE EDUCATION/TRAINING PROGRAM

## 2024-02-13 PROCEDURE — 97535 SELF CARE MNGMENT TRAINING: CPT

## 2024-02-13 PROCEDURE — 94760 N-INVAS EAR/PLS OXIMETRY 1: CPT

## 2024-02-13 PROCEDURE — 94645 CONT INHLJ TX EACH ADDL HOUR: CPT

## 2024-02-13 PROCEDURE — 94644 CONT INHLJ TX 1ST HOUR: CPT

## 2024-02-13 PROCEDURE — 99222 1ST HOSP IP/OBS MODERATE 55: CPT

## 2024-02-13 PROCEDURE — 82948 REAGENT STRIP/BLOOD GLUCOSE: CPT

## 2024-02-13 PROCEDURE — NC001 PR NO CHARGE: Performed by: ANESTHESIOLOGY

## 2024-02-13 PROCEDURE — 31500 INSERT EMERGENCY AIRWAY: CPT

## 2024-02-13 PROCEDURE — 80048 BASIC METABOLIC PNL TOTAL CA: CPT | Performed by: STUDENT IN AN ORGANIZED HEALTH CARE EDUCATION/TRAINING PROGRAM

## 2024-02-13 PROCEDURE — 94002 VENT MGMT INPAT INIT DAY: CPT

## 2024-02-13 PROCEDURE — 0BH17EZ INSERTION OF ENDOTRACHEAL AIRWAY INTO TRACHEA, VIA NATURAL OR ARTIFICIAL OPENING: ICD-10-PCS | Performed by: ANESTHESIOLOGY

## 2024-02-13 PROCEDURE — 31238 NSL/SINS NDSC SRG NSL HEMRRG: CPT | Performed by: STUDENT IN AN ORGANIZED HEALTH CARE EDUCATION/TRAINING PROGRAM

## 2024-02-13 PROCEDURE — 97116 GAIT TRAINING THERAPY: CPT

## 2024-02-13 PROCEDURE — 31500 INSERT EMERGENCY AIRWAY: CPT | Performed by: ANESTHESIOLOGY

## 2024-02-13 PROCEDURE — 5A1955Z RESPIRATORY VENTILATION, GREATER THAN 96 CONSECUTIVE HOURS: ICD-10-PCS | Performed by: ANESTHESIOLOGY

## 2024-02-13 PROCEDURE — 99221 1ST HOSP IP/OBS SF/LOW 40: CPT | Performed by: STUDENT IN AN ORGANIZED HEALTH CARE EDUCATION/TRAINING PROGRAM

## 2024-02-13 PROCEDURE — 99233 SBSQ HOSP IP/OBS HIGH 50: CPT | Performed by: INTERNAL MEDICINE

## 2024-02-13 PROCEDURE — 85007 BL SMEAR W/DIFF WBC COUNT: CPT | Performed by: STUDENT IN AN ORGANIZED HEALTH CARE EDUCATION/TRAINING PROGRAM

## 2024-02-13 PROCEDURE — 94668 MNPJ CHEST WALL SBSQ: CPT

## 2024-02-13 PROCEDURE — 71045 X-RAY EXAM CHEST 1 VIEW: CPT

## 2024-02-13 RX ORDER — NOREPINEPHRINE BITARTRATE 1 MG/ML
INJECTION, SOLUTION INTRAVENOUS
Status: COMPLETED
Start: 2024-02-13 | End: 2024-02-13

## 2024-02-13 RX ORDER — ACETAMINOPHEN 10 MG/ML
1000 INJECTION, SOLUTION INTRAVENOUS ONCE
Status: COMPLETED | OUTPATIENT
Start: 2024-02-13 | End: 2024-02-13

## 2024-02-13 RX ORDER — OXYMETAZOLINE HYDROCHLORIDE 0.05 G/100ML
10 SPRAY NASAL 2 TIMES DAILY PRN
Status: DISCONTINUED | OUTPATIENT
Start: 2024-02-13 | End: 2024-02-13

## 2024-02-13 RX ORDER — OXYMETAZOLINE HYDROCHLORIDE 0.05 G/100ML
6 SPRAY NASAL 2 TIMES DAILY PRN
Status: DISPENSED | OUTPATIENT
Start: 2024-02-13 | End: 2024-02-16

## 2024-02-13 RX ORDER — PROPOFOL 10 MG/ML
INJECTION, EMULSION INTRAVENOUS
Status: COMPLETED
Start: 2024-02-13 | End: 2024-02-13

## 2024-02-13 RX ORDER — CHLORHEXIDINE GLUCONATE ORAL RINSE 1.2 MG/ML
15 SOLUTION DENTAL EVERY 12 HOURS SCHEDULED
Status: DISCONTINUED | OUTPATIENT
Start: 2024-02-13 | End: 2024-04-18

## 2024-02-13 RX ORDER — FENTANYL CITRATE-0.9 % NACL/PF 10 MCG/ML
50 PLASTIC BAG, INJECTION (ML) INTRAVENOUS CONTINUOUS
Status: DISCONTINUED | OUTPATIENT
Start: 2024-02-13 | End: 2024-02-15

## 2024-02-13 RX ORDER — EPINEPHRINE 1 MG/ML
0.1 INJECTION, SOLUTION, CONCENTRATE INTRAVENOUS ONCE
Status: COMPLETED | OUTPATIENT
Start: 2024-02-13 | End: 2024-02-13

## 2024-02-13 RX ORDER — FENTANYL CITRATE/PF 50 MCG/ML
SYRINGE (ML) INJECTION
Status: COMPLETED
Start: 2024-02-13 | End: 2024-02-13

## 2024-02-13 RX ORDER — INSULIN LISPRO 100 [IU]/ML
1-5 INJECTION, SOLUTION INTRAVENOUS; SUBCUTANEOUS
Status: DISCONTINUED | OUTPATIENT
Start: 2024-02-14 | End: 2024-02-14

## 2024-02-13 RX ORDER — MIDAZOLAM HYDROCHLORIDE 2 MG/2ML
4 INJECTION, SOLUTION INTRAMUSCULAR; INTRAVENOUS ONCE
Status: COMPLETED | OUTPATIENT
Start: 2024-02-13 | End: 2024-02-13

## 2024-02-13 RX ORDER — FENTANYL CITRATE 50 UG/ML
100 INJECTION, SOLUTION INTRAMUSCULAR; INTRAVENOUS ONCE
Status: COMPLETED | OUTPATIENT
Start: 2024-02-13 | End: 2024-02-13

## 2024-02-13 RX ORDER — SUCCINYLCHOLINE/SOD CL,ISO/PF 100 MG/5ML
80 SYRINGE (ML) INTRAVENOUS ONCE
Status: COMPLETED | OUTPATIENT
Start: 2024-02-13 | End: 2024-02-13

## 2024-02-13 RX ORDER — METHYLPREDNISOLONE SODIUM SUCCINATE 125 MG/2ML
125 INJECTION, POWDER, LYOPHILIZED, FOR SOLUTION INTRAMUSCULAR; INTRAVENOUS EVERY 12 HOURS SCHEDULED
Status: COMPLETED | OUTPATIENT
Start: 2024-02-13 | End: 2024-02-14

## 2024-02-13 RX ORDER — SODIUM CHLORIDE/ALOE VERA
1 GEL (GRAM) NASAL
Status: DISCONTINUED | OUTPATIENT
Start: 2024-02-13 | End: 2024-02-21

## 2024-02-13 RX ORDER — EPINEPHRINE 0.1 MG/ML
INJECTION INTRAVENOUS
Status: COMPLETED
Start: 2024-02-13 | End: 2024-02-13

## 2024-02-13 RX ORDER — ECHINACEA PURPUREA EXTRACT 125 MG
1 TABLET ORAL
Status: DISCONTINUED | OUTPATIENT
Start: 2024-02-13 | End: 2024-02-21

## 2024-02-13 RX ORDER — PANTOPRAZOLE SODIUM 40 MG/10ML
40 INJECTION, POWDER, LYOPHILIZED, FOR SOLUTION INTRAVENOUS
Status: DISCONTINUED | OUTPATIENT
Start: 2024-02-14 | End: 2024-02-16

## 2024-02-13 RX ORDER — PROPOFOL 10 MG/ML
5-100 INJECTION, EMULSION INTRAVENOUS
Status: DISCONTINUED | OUTPATIENT
Start: 2024-02-13 | End: 2024-02-16

## 2024-02-13 RX ORDER — PROPOFOL 10 MG/ML
100 INJECTION, EMULSION INTRAVENOUS ONCE
Status: COMPLETED | OUTPATIENT
Start: 2024-02-13 | End: 2024-02-13

## 2024-02-13 RX ORDER — TRANEXAMIC ACID 100 MG/ML
500 INJECTION, SOLUTION INTRAVENOUS ONCE
Status: DISCONTINUED | OUTPATIENT
Start: 2024-02-13 | End: 2024-02-14

## 2024-02-13 RX ORDER — MIDAZOLAM HYDROCHLORIDE 2 MG/2ML
INJECTION, SOLUTION INTRAMUSCULAR; INTRAVENOUS
Status: COMPLETED
Start: 2024-02-13 | End: 2024-02-13

## 2024-02-13 RX ADMIN — INSULIN LISPRO 1 UNITS: 100 INJECTION, SOLUTION INTRAVENOUS; SUBCUTANEOUS at 08:20

## 2024-02-13 RX ADMIN — SODIUM CHLORIDE 250 MG: 0.9 INJECTION, SOLUTION INTRAVENOUS at 08:35

## 2024-02-13 RX ADMIN — VENLAFAXINE 25 MG: 25 TABLET ORAL at 11:50

## 2024-02-13 RX ADMIN — CHLORHEXIDINE GLUCONATE 0.12% ORAL RINSE 15 ML: 1.2 LIQUID ORAL at 20:57

## 2024-02-13 RX ADMIN — TOPIRAMATE 100 MG: 100 TABLET, FILM COATED ORAL at 18:34

## 2024-02-13 RX ADMIN — Medication 80 MG: at 17:30

## 2024-02-13 RX ADMIN — Medication 50 MCG/HR: at 18:03

## 2024-02-13 RX ADMIN — NOREPINEPHRINE BITARTRATE 5 MCG/MIN: 1 INJECTION, SOLUTION, CONCENTRATE INTRAVENOUS at 18:30

## 2024-02-13 RX ADMIN — PROPOFOL 100 MG: 10 INJECTION, EMULSION INTRAVENOUS at 17:30

## 2024-02-13 RX ADMIN — SULFAMETHOXAZOLE AND TRIMETHOPRIM 2.5 TABLET: 800; 160 TABLET ORAL at 18:34

## 2024-02-13 RX ADMIN — POLYETHYLENE GLYCOL 3350 17 G: 17 POWDER, FOR SOLUTION ORAL at 08:22

## 2024-02-13 RX ADMIN — SULFAMETHOXAZOLE AND TRIMETHOPRIM 2.5 TABLET: 800; 160 TABLET ORAL at 08:23

## 2024-02-13 RX ADMIN — NOREPINEPHRINE BITARTRATE 32 MCG: 1 INJECTION, SOLUTION, CONCENTRATE INTRAVENOUS at 17:35

## 2024-02-13 RX ADMIN — VENLAFAXINE 25 MG: 25 TABLET ORAL at 08:23

## 2024-02-13 RX ADMIN — FENTANYL CITRATE 100 MCG: 50 INJECTION INTRAMUSCULAR; INTRAVENOUS at 17:59

## 2024-02-13 RX ADMIN — NYSTATIN: 100000 POWDER TOPICAL at 09:00

## 2024-02-13 RX ADMIN — INSULIN LISPRO 2 UNITS: 100 INJECTION, SOLUTION INTRAVENOUS; SUBCUTANEOUS at 08:21

## 2024-02-13 RX ADMIN — METHYLPREDNISOLONE SODIUM SUCCINATE 125 MG: 125 INJECTION, POWDER, FOR SOLUTION INTRAMUSCULAR; INTRAVENOUS at 20:57

## 2024-02-13 RX ADMIN — EPOPROSTENOL 50 NG/KG/MIN: 1.5 INJECTION, POWDER, LYOPHILIZED, FOR SOLUTION INTRAVENOUS at 11:48

## 2024-02-13 RX ADMIN — EPOPROSTENOL 50 NG/KG/MIN: 1.5 INJECTION, POWDER, LYOPHILIZED, FOR SOLUTION INTRAVENOUS at 19:31

## 2024-02-13 RX ADMIN — ACETAMINOPHEN 1000 MG: 10 INJECTION INTRAVENOUS at 08:06

## 2024-02-13 RX ADMIN — Medication 27.5 G: at 10:37

## 2024-02-13 RX ADMIN — MIDAZOLAM HYDROCHLORIDE 4 MG: 2 INJECTION, SOLUTION INTRAMUSCULAR; INTRAVENOUS at 17:30

## 2024-02-13 RX ADMIN — NYSTATIN: 100000 POWDER TOPICAL at 18:18

## 2024-02-13 RX ADMIN — EPOPROSTENOL 50 NG/KG/MIN: 1.5 INJECTION, POWDER, LYOPHILIZED, FOR SOLUTION INTRAVENOUS at 03:41

## 2024-02-13 RX ADMIN — FENTANYL CITRATE 100 MCG: 50 INJECTION, SOLUTION INTRAMUSCULAR; INTRAVENOUS at 17:59

## 2024-02-13 RX ADMIN — PROPOFOL 35 MCG/KG/MIN: 10 INJECTION, EMULSION INTRAVENOUS at 20:57

## 2024-02-13 RX ADMIN — ENOXAPARIN SODIUM 40 MG: 40 INJECTION SUBCUTANEOUS at 08:22

## 2024-02-13 RX ADMIN — INSULIN LISPRO 2 UNITS: 100 INJECTION, SOLUTION INTRAVENOUS; SUBCUTANEOUS at 16:20

## 2024-02-13 RX ADMIN — TOPIRAMATE 100 MG: 100 TABLET, FILM COATED ORAL at 08:23

## 2024-02-13 RX ADMIN — VENLAFAXINE 25 MG: 25 TABLET ORAL at 18:34

## 2024-02-13 RX ADMIN — SULFAMETHOXAZOLE AND TRIMETHOPRIM 2.5 TABLET: 800; 160 TABLET ORAL at 20:57

## 2024-02-13 RX ADMIN — Medication 1 APPLICATION: at 22:16

## 2024-02-13 RX ADMIN — Medication 1 SPRAY: at 22:16

## 2024-02-13 RX ADMIN — PROPOFOL 10 MCG/KG/MIN: 10 INJECTION, EMULSION INTRAVENOUS at 17:40

## 2024-02-13 RX ADMIN — Medication 1 SPRAY: at 20:10

## 2024-02-13 RX ADMIN — MIDAZOLAM 4 MG: 1 INJECTION INTRAMUSCULAR; INTRAVENOUS at 17:30

## 2024-02-13 RX ADMIN — LAMOTRIGINE 150 MG: 100 TABLET ORAL at 08:22

## 2024-02-13 RX ADMIN — Medication 1 APPLICATION: at 20:11

## 2024-02-13 RX ADMIN — ACETAMINOPHEN 650 MG: 325 TABLET, FILM COATED ORAL at 15:08

## 2024-02-13 RX ADMIN — LAMOTRIGINE 150 MG: 100 TABLET ORAL at 18:28

## 2024-02-13 RX ADMIN — Medication 1 SPRAY: at 16:20

## 2024-02-13 RX ADMIN — Medication 0.1 MG: at 18:48

## 2024-02-13 RX ADMIN — INSULIN LISPRO 2 UNITS: 100 INJECTION, SOLUTION INTRAVENOUS; SUBCUTANEOUS at 11:49

## 2024-02-13 NOTE — PROGRESS NOTES
Progress Note - Infectious Disease   Carla Hnut 59 y.o. female MRN: [id number]  [facility]: [id number]      Impression/Recommendations:  Recurrent acute hypoxic respiratory failure.  Initially, on admission early January, secondary to mostly COVID but there was concern for concurrent bacterial pneumonia on admission due to elevated procalcitonin. Patient completed treatment course for both COVID and bacterial pneumonia.  She was clinically improved with decreasing O2 requirement.  However, patient deteriorated in late January, requiring to be placed back on high flow O2 support.  Chest CT more suggestive of COVID and postviral bacterial pneumonia.  Procalcitonin x 3 were in the normal range.  Patient has no fever or leukocytosis.  She improved with increasing steroid dose.  She completed a 5-day course of Bactrim/minocycline for presumptive stenotrophomonas respiratory infection, with expectorated sputum growing stenotrophomonas.  She subsequently had bronchoscopy, with BAL culture negative for bacterial growth, AFB and PCP but completed the course of Bactrim/minocycline prior to bronchoscopy.  Patient improved and O2 was being weaned again until past weekend, when she deteriorated again.  She is now back in ICU.  Expectorated sputum once again is growing stenotrophomonas.  Repeat chest CT showed stable post-COVID changes, without consolidation.  Not sure that the stenotrophomonas that is growing again and expectorated sputum is pathogenic versus upper airway colonization.  Patient is now back on high-dose steroid and Bactrim.  Respiratory status overall stable and tenuous, and remaining on high flow O2 support.  If patient does not improve with current treatment regimen, she may benefit from another bronchoscopy.  .  Continue high-dose Bactrim.  As long as patient can tolerate, treat x 14 day course this time around.  Continue high-dose systemic corticosteroid, vetetri and IVIG per  critical service  Monitor temperature/WBC.  Monitor respiratory symptoms.     Severe COVID, present on admission.  Patient was treated with remdesivir, dexamethasone and was given 1 dose of Tocilizumab on admission.  She also had a course of antibiotic initially for presumptive bacterial superinfection.  Current chest CT is showing extensive fibrotic changes.  No additional antiviral needed.     CKD.  Creatinine improved.  Monitor creatinine.     Encephalopathy on admission.  This has resolved.  There was concern for possible seizure but no witnessed seizure and EEG did not capture it.  Monitor mental status.     Seizure disorder, status post temporal lobe resection in .     B-cell lymphoma, on chemotherapy, which is currently postponed.  Patient was leukopenic on admission but WBC now is near normal range.  Monitor WBC/ANC.     Discussed with patient in detail regarding the above plan.     Antibiotics:  Bactrim # 4     Subjective:  Patient's respiratory status is overall stable.  She remains in ICU, on high flow O2 support.  Patient had epistaxis overnight.  Temperature stays down.  No chills.  She is tolerating antibiotic well.  No nausea, vomiting or diarrhea.      Objective:  Vitals:  Temp:  [98.2 °F (36.8 °C)-98.5 °F (36.9 °C)] 98.2 °F (36.8 °C)  HR:  [] 92  Resp:  [12-43] 26  BP: ()/(55-72) 123/66  SpO2:  [65 %-99 %] 89 %  Temp (24hrs), Av.4 °F (36.9 °C), Min:98.2 °F (36.8 °C), Max:98.5 °F (36.9 °C)  Current: Temperature: 98.2 °F (36.8 °C)    Physical Exam:     General: Awake, alert, cooperative, no distress.   Neck:  Supple. No mass.  No lymphadenopathy.   Lungs: Decreased breath sounds, stable mild basilar rhonchi, no rales, no wheezing, respiration remains labored.   Heart:  Regular rate and rhythm, S1 and S2 normal, no murmur.   Abdomen: Soft, nondistended, non-tender, bowel sounds active all four quadrants, no masses, no organomegaly.   Extremities: Stable mild leg edema. No  erythema/warmth. No ulcer. Nontender to palpation.   Skin:  No rash.   Neuro: Moves all extremities.     Invasive Devices       Peripheral Intravenous Line  Duration             Peripheral IV 02/09/24 Left;Upper;Ventral (anterior) Arm 4 days    Peripheral IV 02/10/24 Right;Upper;Ventral (anterior) Arm 3 days                    Labs studies:   I have personally reviewed pertinent labs.  Results from last 7 days   Lab Units 02/13/24  0552 02/12/24 0447 02/11/24  0450   POTASSIUM mmol/L 5.0 4.0 4.2   CHLORIDE mmol/L 106 105 105   CO2 mmol/L 23 23 26   BUN mg/dL 31* 31* 31*   CREATININE mg/dL 0.95 0.91 0.88   EGFR ml/min/1.73sq m 66 69 72   CALCIUM mg/dL 9.1 9.6 9.5   AST U/L  --  22  --    ALT U/L  --  35  --    ALK PHOS U/L  --  72  --      Results from last 7 days   Lab Units 02/13/24  0552 02/12/24 0447 02/11/24  0450   WBC Thousand/uL 7.29 9.11 8.24   HEMOGLOBIN g/dL 10.1* 10.5* 10.8*   PLATELETS Thousands/uL 173 186 165     Results from last 7 days   Lab Units 02/10/24  1516 02/09/24  1203   SPUTUM CULTURE   --  4+ Growth of Stenotrophomonas maltophilia*  2+ Growth of   GRAM STAIN RESULT   --  No Epithelial cells per low power field*  No Polys*  4+ Gram negative rods*  Rare Gram positive cocci in clusters*   MRSA CULTURE ONLY  No Methicillin Resistant Staphlyococcus aureus (MRSA) isolated  --        Imaging Studies:   I have personally reviewed pertinent imaging study reports and images in PACS.    EKG, Pathology, and Other Studies:   I have personally reviewed pertinent reports.

## 2024-02-13 NOTE — DISCHARGE INSTR - OTHER ORDERS
Cleanse b/l sacro-buttocks with soap and water, pat dry, and apply bordered silicone foam dressing to cover the wounds. Change every other day and as needed for soilage/dislodgement.     Cleanse b/l groin with soap and water, pat dry, and dust the skin lightly with nystatin powder per order. May use alginate rope to skin folds if skin is open or macerated. Change BID and PRN.

## 2024-02-13 NOTE — PHYSICAL THERAPY NOTE
"   PT Treatment       02/13/24 1126   PT Last Visit   PT Visit Date 02/13/24   Note Type   Note Type Treatment   Education   Education Provided Yes   End of Consult   Patient Position at End of Consult Bedside chair   Pain Assessment   Pain Assessment Tool 0-10   Pain Score No Pain   Restrictions/Precautions   Weight Bearing Precautions Per Order No   Braces or Orthoses   (none)   Other Precautions Cognitive;Chair Alarm;Bed Alarm;Multiple lines;Telemetry;O2;Fall Risk;Pain   General   Chart Reviewed Yes   Family/Caregiver Present No   Cognition   Overall Cognitive Status Impaired   Arousal/Participation Cooperative   Orientation Level Oriented X4   Memory Decreased recall of precautions   Subjective   Subjective \"I have to go to the bathroom.\"   Bed Mobility   Supine to Sit 5  Supervision   Additional items HOB elevated;Increased time required;Verbal cues   Additional Comments pt received in bed supine and left in recliner w/ chair alarm   Transfers   Sit to Stand 4  Minimal assistance   Additional items Assist x 1;Increased time required;Verbal cues   Stand to Sit 4  Minimal assistance   Additional items Assist x 1;Increased time required;Verbal cues;Armrests   Toilet transfer 4  Minimal assistance   Additional items Assist x 1  (bed pan on recliner chair)   Ambulation/Elevation   Gait pattern Shuffling;Inconsistent pj;Excessively slow   Gait Assistance 4  Minimal assist   Additional items Assist x 1;Verbal cues;Tactile cues   Distance 3 ft  (from bed to recliner)   Balance   Static Sitting Fair -   Dynamic Sitting Fair -   Static Standing Poor +   Dynamic Standing Poor +   Ambulatory Poor +   Endurance Deficit   Endurance Deficit Yes   Endurance Deficit Description limited due to fatigue and SOB   Activity Tolerance   Activity Tolerance Patient limited by fatigue;Treatment limited secondary to medical complications (Comment)  (decrease in O2 during mobility)   Medical Staff Made Aware OT   Nurse Made Aware yes "   Exercises   Balance Training Sitting  (5 mins x 2 of static sitting, required close supervision)   Assessment   Prognosis Good   Problem List Decreased strength;Decreased endurance;Impaired balance;Decreased mobility;Decreased safety awareness;Pain   Assessment Pt is a 59 y.o. female presenting to [facility] on  1/10/2024 1941 w/ primary complaint of acute respiratory failure with hypoxia and COVID-19. Pt  has a past medical history of hypercalcemia, Lymphoma (HCC), Parathyroid disease (HCC), Seizures (HCC), and Situational depression. Pt has had a prolonged hospital stay (due to medical complications) and currently requires 56-58L hi-migue. Pt did tolerate treatment well today. Pt was able to perform a supine to sit w/ min assist x 1 (required extra time). Pt required a seated rest break w/ breathing cues in order to prevent a decrease in 02 levels. Pt was able to perform a sit to stand w/ min assist x 1 (required extra time and cueing to extend knees fully). Pt was able to ambulate 3 ft from the bed to the recliner w/ min assist x 1 (sat on bed pan in chair, tolerated ambulation well). O2 dropped to 75 after ambulation, but quickly improved after a few minutes of seated chair rest. Pt would continue to benefit from skilled PT. Pt will continue to be seen upon admission. Pt's prognosis is Good secondary to age, PLOF, and PMH. The patient's AM-PAC Basic Mobility Inpatient Standardized Score is less than 42.9, suggesting this patient may benefit from discharge to post-acute rehabilitation services. Please also refer to the recommendation of the Physical Therapist for safe discharge planning.   Goals   Patient Goals to go to bathroom independently   LTG Expiration Date 02/27/24  (prolonged hospital stay due to medical complications, goals remain appropriate w/ extended date)   Long Term Goal #1 1) Perform bed mobility mod-I to participate in frequent repositioning and improve skin integrity  2) Perform  functional transfers mod-I to promote I with OOB mobility  3) Improve b/l LE strength by 1/2 grade in order to improve efficiency of tranfers  4) Improve sitting and standing balance by 1 grade to improve safety and independence   Plan   Treatment/Interventions LE strengthening/ROM;Functional transfer training;Therapeutic exercise;Endurance training;Equipment eval/education;Bed mobility;Gait training;OT;Patient/family training   Progress Slow progress, medical status limitations   PT Frequency 2-3x/wk   Discharge Recommendation   Rehab Resource Intensity Level, PT II (Moderate Resource Intensity)     Karlene Mata, SPT

## 2024-02-13 NOTE — PLAN OF CARE
Problem: OCCUPATIONAL THERAPY ADULT  Goal: Performs self-care activities at highest level of function for planned discharge setting.  See evaluation for individualized goals.  Description: Treatment Interventions: ADL retraining, Functional transfer training, UE strengthening/ROM, Endurance training, Patient/family training, Equipment evaluation/education, Compensatory technique education, Continued evaluation, Energy conservation, Activityengagement          See flowsheet documentation for full assessment, interventions and recommendations.   Note: Limitation: Decreased ADL status, Decreased Safe judgement during ADL, Decreased cognition, Decreased endurance, Decreased self-care trans, Decreased high-level ADLs  Prognosis: Good  Assessment: Patient participated in Skilled OT session 2/13/2024 with interventions consisting of ADL re training with the use of correct body mechnaics, Energy Conservation techniques, deep breathing technique, safety awareness and fall prevention techniques,  therapeutic activities to: increase activity tolerance, increase postural control, increase trunk control, and increase OOB/ sitting tolerance . Patient agreeable to OT treatment session, upon arrival patient was found supine in bed.  In comparison to previous session, patient with improvements in toileting and bed mobility. Patient requiring verbal cues for pacing thru activity steps and frequent rest periods. Patient continues to be functioning below baseline level, occupational performance remains limited secondary to factors listed above and increased risk for falls and injury.   From OT standpoint, recommendation at time of d/c would be Home OT,pending progress.   Patient to benefit from continued Occupational Therapy treatment while in the hospital to address deficits as defined above and maximize level of functional independence with ADLs and functional mobility.     Rehab Resource Intensity Level, OT: III (Minimum Resource  Intensity)     Felisha Wilkerson MS, OTR/L

## 2024-02-13 NOTE — CONSULTS
OTOLARYNGOLOGY CONSULT    Date of Service: 2/13/2024    Reason for consult: epistaxis     ASSESSMENT/PLAN:  Carla Hunt is a 59 y.o. female who we are consulted on for epistaxis.  Clots were removed at bedside to facilitate usage of high flow nasal cannula.  Discussed with patient that given the persistent use of high flow she may have some repeat bleeding.  Utilizing nasal packing was deferred at this time to help with the high flow nasal cannula.  Nasal packing would cause further obstruction, but can be considered if patient rebleeds and is unable to control bleeding with conservative measures.    Recommendations:    Epistaxis:  - If re-bleeding:   Can spray 10 sprays of afrin on each nostril and hold pressure for 20 minutes (or use blue clip from ED). Can repeat this two additional times.   -ICU team can also soak small gauze in afrin and place in nostrils  -ICU can also place small amounts of surgifoam into nostrils to help with bleeding.  -Nasal saline sprays and saline gel ordered. Use every 1-2 hours to keep nose dry  -No petroleum based products in nose given oxygen use  -Avoid lifting, bending, straining  -If unable to control bleeding reach out to ENT resident role on TT    ENT will follow from the periphery and see patient PRN during remainder of hospital stay. Please reach out with questions/concerns.       HPI  Patient is a 59-year-old woman who was admitted for acute hypoxic respiratory failure requiring high flow nasal cannula who ENT is consulted on for epistaxis.  Patient reports that she has a history of nosebleeds, however last night she had a bad nosebleed for the first time.  She says she was bleeding out of her right nostril for approximately 30 minutes.  The bleeding was controlled with Afrin and pressure at that time.  However, she has had trouble breathing through her nose since her nosebleed last night.    CURRENT HOSPITAL MEDICATIONS  Current Facility-Administered Medications    Medication Dose Route Frequency Provider Last Rate Last Admin    acetaminophen (TYLENOL) tablet 650 mg  650 mg Oral Q6H PRN Eugenia Langston DO   650 mg at 02/10/24 1000    bisacodyl (DULCOLAX) rectal suppository 10 mg  10 mg Rectal Daily PRN Eugenia Langston DO        calcium carbonate (TUMS) chewable tablet 500 mg  500 mg Oral BID PRN Eugenia Langston DO   500 mg at 02/02/24 1843    carbamide peroxide (DEBROX) 6.5 % otic solution 5 drop  5 drop Both Ears BID Miguel Whittaker MD   5 drop at 02/12/24 1737    enoxaparin (LOVENOX) subcutaneous injection 40 mg  40 mg Subcutaneous Daily Eugenia Langston DO   40 mg at 02/13/24 0822    epoprostenol (VELETRI) 30,000 ng/mL in sodium chloride 0.9 % 50 mL inhalation solution  6.25-50 ng/kg/min (Ideal) Inhalation Titrated Mahamed Cardenas DO 6.4 mL/hr at 02/13/24 1148 50 ng/kg/min at 02/13/24 1148    immune globulin, human (GAMUNEX-C) 27.5 g 275 mL IV soln  400 mg/kg (Adjusted) Intravenous Daily Risa Handley  mL/hr at 02/13/24 1235 Rate Change at 02/13/24 1235    insulin lispro (HumaLOG) 100 units/mL subcutaneous injection 1-5 Units  1-5 Units Subcutaneous 4x Daily (AC & HS) Eugenia Langston DO   1 Units at 02/13/24 0820    insulin lispro (HumaLOG) 100 units/mL subcutaneous injection 2 Units  2 Units Subcutaneous TID With Meals Eugenia Langston DO   2 Units at 02/13/24 1149    lamoTRIgine (LaMICtal) tablet 150 mg  150 mg Oral BID Mahamed Cardenas DO        melatonin tablet 6 mg  6 mg Oral HS Eugenia Langston DO   6 mg at 02/12/24 2257    methylPREDNISolone sodium succinate (Solu-MEDROL) injection 125 mg  125 mg Intravenous Q12H [facility] Dl Pillai MD        metoprolol (LOPRESSOR) injection 2.5 mg  2.5 mg Intravenous Q6H PRN Eugenia Langston DO        nystatin (MYCOSTATIN) powder   Topical BID Miguel Whittaker MD   Given at 02/13/24 0900    ondansetron (ZOFRAN) injection 4 mg  4 mg Intravenous Q6H PRN Eugenia Langston DO   4 mg at 02/03/24 2106    polyethylene glycol  "(MIRALAX) packet 17 g  17 g Oral Daily Eugenia Langston DO   17 g at 24 0822    QUEtiapine (SEROquel) tablet 12.5 mg  12.5 mg Oral HS Eugenia Langston DO   12.5 mg at 24 2257    senna-docusate sodium (SENOKOT S) 8.6-50 mg per tablet 2 tablet  2 tablet Oral BID PRN Eugenia Langston DO   2 tablet at 24 0829    sodium chloride (OCEAN) 0.65 % nasal spray 1 spray  1 spray Each Nare Q1H PRN Eugenia Langston DO   1 spray at 24 0945    sulfamethoxazole-trimethoprim (BACTRIM DS) 800-160 mg per tablet 2.5 tablet  5 mg/kg of trimethoprim Oral TID Risa Handley MD   2.5 tablet at 24 0823    topiramate (TOPAMAX) tablet 100 mg  100 mg Oral BID Eugenia Langston DO   100 mg at 24 0823    tranexamic acid 100mg/mL (for epistaxis) 500 mg  500 mg Nasal Once Taiwo Preciado DO        venlafaxine (EFFEXOR) tablet 25 mg  25 mg Oral TID With Meals Eugenia Langston DO   25 mg at 24 1150       REVIEW OF SYSTEMS  As above  Review of Systems    HISTORIES  PMH:  Past Medical History:   Diagnosis Date    Hx of hypercalcemia     Lymphoma (HCC) 2021    Parathyroid disease (HCC)     Seizures (HCC)     Situational depression     24 yr old son  from drug overdose       PSH:  Past Surgical History:   Procedure Laterality Date    CRANIOTOMY FOR TEMPORAL LOBECTOMY Left        SocHx:  Social History     Tobacco Use    Smoking status: Never    Smokeless tobacco: Never   Vaping Use    Vaping status: Never Used   Substance Use Topics    Alcohol use: No    Drug use: Never       FH:  Family History   Problem Relation Age of Onset    Diabetes Mother     Cancer Father        ALLERGIES:  No Known Allergies    PHYSICAL EXAM  Visit Vitals  /64   Pulse 100   Temp 98 °F (36.7 °C) (Oral)   Resp (!) 36   Ht 5' 8\" (1.727 m)   Wt 74.5 kg (164 lb 3.9 oz)   SpO2 (!) 82%   BMI 24.97 kg/m²   OB Status Postmenopausal   Smoking Status Never   BSA 1.88 m²       Physical Exam  Constitutional:       Appearance: She is " normal weight. She is ill-appearing.   HENT:      Nose:      Comments: Dried clots visualized on anterior rhinoscopy.   Eyes:      Conjunctiva/sclera: Conjunctivae normal.   Pulmonary:      Comments: On HFNC. Desaturations when removed  Skin:     General: Skin is warm.   Neurological:      Mental Status: She is alert and oriented to person, place, and time.   Psychiatric:         Thought Content: Thought content normal.           LABORATORY  Reviewed    PROCEDURES  Epistaxis control    Both nasal cavities were visualized using a nasal speculum.  A 10 American suction was passed bilaterally to remove clots.  This resulted in some at active bleeding.  A vessel source was unable to be visualized.  Bleeding was controlled with topical Afrin soaked gauze.  Patient tolerated well with no complications.    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    COVID 01/08/2024    Stage 3b chronic kidney disease (CKD) ([facility]) 01/08/2024    Abnormal CT of the head 01/08/2024    Nephrolithiasis 05/13/2021    Other specified anxiety disorders 11/14/2019    Reactive depression 11/14/2019    Hidradenitis suppurativa of left axilla 07/08/2019    Anxiety state 11/05/2018    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    Disorder of parathyroid gland ([facility]) 11/05/2018    Eczema 11/05/2018    Gastroenteritis 11/05/2018    Grand mal status ([facility]) 11/05/2018    Hypercalcemia 11/05/2018    Hypertensive disorder 11/05/2018    Impacted cerumen 11/05/2018    Open wound of hand except fingers 11/05/2018    Otitis externa 11/05/2018    Overweight 11/05/2018    Pain in face 11/05/2018    Skin sensation disturbance 11/05/2018    Subjective visual  disturbance 11/05/2018    Encounter for gynecological examination (general) (routine) without abnormal findings 10/23/2018    Long term current use of hormonal contraceptive 10/23/2018    Rosacea 07/30/2015         Jackie Tanner MD   Otolaryngology--Head and Neck Surgery  [facility]  2/13/2024 2:25 PM

## 2024-02-13 NOTE — PROCEDURES
Intubation    Date/Time: 2/13/2024 6:33 PM    Performed by: Mahamed Cardenas DO  Authorized by: Mahamed Cardenas DO    Patient location:  Bedside  Other Assisting Provider: Yes (comment) (Dr. Roldan and Dr. Quinones)    Consent:     Consent obtained:  Verbal    Consent given by:  Patient and spouse    Risks discussed:  Aspiration, brain injury, dental trauma, laryngeal injury, pneumothorax, hypoxia, death and bleeding    Alternatives discussed:  Delayed treatment  Universal protocol:     Procedure explained and questions answered to patient or proxy's satisfaction: yes      Relevant documents present and verified: yes      Test results available and properly labeled: yes      Radiology Images displayed and confirmed.  If images not available, report reviewed: yes      Required blood products, implants, devices, and special equipment available: yes      Site/side marked: yes      Immediately prior to procedure, a time out was called: yes      Patient identity confirmed:  Arm band  Pre-procedure details:     Patient status:  Awake    Mallampati score:  2    Pretreatment medications:  Midazolam, propofol and fentanyl    Paralytics:  Succinylcholine  Indications:     Indications for intubation: respiratory distress, respiratory failure and hypoxemia    Procedure details:     Preoxygenation:  Bag valve mask    CPR in progress: no      Intubation method:  Oral    Oral intubation technique:  Glidescope    Laryngoscope blade:  Mac 3    Tube size (mm):  7.5    Tube type:  Cuffed    Number of attempts:  1    Ventilation between attempts: no      Cricoid pressure: yes      Tube visualized through cords: yes    Placement assessment:     ETT to lip:  23    ETT to teeth:  22    Tube secured with:  ETT escudero    Breath sounds:  Equal    Placement verification: chest rise, equal breath sounds and ETCO2 detector      Ventilator settings:  Initially VC, now PC  Post-procedure details:     Patient tolerance of procedure:  Tolerated well,  no immediate complications    Mahamed Cardenas, DO

## 2024-02-13 NOTE — PROGRESS NOTES
Progress Note - Wound   Carla Hunt 59 y.o. female MRN: [id number]  Unit/Bed#: MICU 10 Encounter: [id number]      Assessment:  Pressure injury of the sacral region, unstageable  Ambulatory dysfunction  Intertrigo       Plan:  B/l sacro-buttocks with what appears to be unstageable pressure injury - hospital acquired.  Wound does not appear infected based on wound image on 2/12/24 and per discussion with primary RN  Recommend to continue with foam dressing to the wound. Change QOD and PRN.   B/l groin MASD/ITD reported by RN  Nystatin powder already ordered by the primary team  Recommend use of alginate rope if skin is open or macerated   A1C results reviewed with the patient today.    Result of 6.6 noted from 1/9/24  Unable to review with patient due completing consult remotely.  Managed by primary team   Will recommend preventative nursing skin care measures  Pressure relief- offloading of pressure with turning/repositioning as patient medically tolerates, heel elevation, foam wedges for offloading/repositioning, and waffle cushion to chair.  Nutrition is following  Truro text wound care team with questions or concerns.   Routine wound care follow-up while admitted. AVS updated.   Discussed with primary RN      Subjective:  Patient is a 59 year old female who is admitted to the hospital with acute respiratory failure with hypoxia. Patient has a history of - epilepsy, COVID-19, B-cell lymphoma, CKD, and HLD. Wound care consulted for sacral wound. Patient with prolonged hospital stay with multiple readmissions to ICU level of care and is currently on hi-migue oxygen 56-58L. Patient is in MICU, on low air loss critical care mattress, and is continent of bowel and bladder per nursing flow-sheets. Per chart review, wound first documented and pictured  by nursing on 2/12/24 and primary RN reports foam dressing in place. Primary RN also reports MASD to the b/l groin folds and nystatin powder has been ordered by the  "primary team. Consult was completed remotely- the patient was not physically assessed today. The consult was completed by chart review including review of the wound images, and discussion with the primary RN via tiger text. Unable to perform ROS and full exam. Limited exam completed by reviewing wound images and discussion with RN.         Objective:      Vitals: Blood pressure 119/59, pulse 81, temperature 98.2 °F (36.8 °C), temperature source Oral, resp. rate (!) 27, height 5' 8\" (1.727 m), weight 74.5 kg (164 lb 3.9 oz), SpO2 93%.,Body mass index is 24.97 kg/m².    Focused Physical Exam:  B/l sacro-buttocks with what appears to be unstageable pressure injury. Wounds appear to be well-defined shaped, mixed partial and full thickness aspects with approx: 25% pink/red tissue and 75% yellow tissue. Assessment findings based off on wound image on 2/12/24. Per primary RN, wound is not producing a lot of drainage. Sofie-wound appears to be intact with pink colored skin.   B/l groin with MASD, likely ITD - no images to review in chart. Nystatin powder ordered by the primary team. Recommendations provided if skin is open or macerated.    Due to the nature of virtual visit: the assessment of the wounds and skin is limited, as this writer is unable to measure the wounds and unable to assess the wounds/skin for blanching, induration/fluctuance, malodor, purulence, and drainage amount/characteristics.      Lab, Imaging and other studies: I have personally reviewed pertinent reports.        Wound 02/12/24 Pressure Injury Buttocks (Active)   Wound Image   02/12/24 0825             Total time spent today:    Total time (face-to-face and non-face-to-face) spent on today's visit was 25 minutes. This includes preparation for the visits (wound images on 2/12/24, critical care note from 2/13/24, and H&P on 1/10/24) performance of a medically appropriate history and examination, and orders for medications/treatments or testing.  " "Discussed assessment findings, and plan of care/recommendations with patients RN.      DANYLELE Liz, FNP-C, CWON      Portions of the record may have been created with voice recognition software.  Occasional wrong word or \"sound a like\" substitutions may have occurred due to the inherent limitations of voice recognition software.  Read the chart carefully and recognize, using context, where substitutions have occurred      "

## 2024-02-13 NOTE — PROGRESS NOTES
[facility]  Progress Note: Critical Care  Name: Carla Hunt 59 y.o. female I MRN: [id number]  Unit/Bed#: MICU 10 I Date of Admission: 1/10/2024   Date of Service: 2/13/2024 I Hospital Day: 34    Assessment/Plan   Neuro:   Diagnosis: seizure disorder  -S/p partial left temporal lobe resection in 2007  -Contuinue home lamictal 150 bid and topamax 100 bid    Diagnosis: anxiety  -Continue home effexor  -Seroquel 12.5 hs and melatonin 6 hs for sleep     CV:   No active issues     Pulm:  Diagnosis: Acute hypoxic respiratory failure due to severe covid and rituxan induced pneumonitis  -Tested COVID positive on 1/7  -Completed severe COVID pathway and 7 days CTX  -Tenuous respiratory status requiring multiple re-admissions to MICU  -S/p Bronch 2/2   -NG on cultures (initially showed non-group A strep)  -PCP and AFB negative   -Legionella, viral, fungal cultures no growth to date  -Pulse dose steroids with solumedrol 1g daily x3 days (completed 2/7) then transitioned to prednisone 80mg daily on 2/8  -S/p Solumedrol 80mg IV and Lasix 40mg IV once 2/10  -Currently on solumedrol 250 mg Q12H  -On veletri  -On IVIG, day 3 today     GI:   No active issues  Bowel regimen with daily MiraLAX and twice daily senna     :   Diagnosis: CKD III  -Baseline Cr appears to be 0.8-1.2  -UA unremarkable   -Upon chart review 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) though unclear how this was diagnosed, this also does not enirely fit as she is NOT hypokalemic  -Continue to monitor I/O and UOP     F/E/N:   F: none, boluses as indicated   E: monitor and replete for goal K>4, P>3, Mg>2  N: regular house      Heme/Onc:   Diagnosis: B cell lymphoma  -[facility]s [facility] heme[facility]c at [facility]  -On rituxan for 2 year course, started May 2022  -Last dose was 12/20, treatment currently on hold   -Leukopenic on admission but WBC now wnl    DVT ppx with lovenox      Endo:   Diagnosis: steroid  hyperglycemia   -SSI, hypoglycemic protocol  -On SSI  -Goal -180     ID:   Diagnosis: Severe covid pneumonia and stenotrophomonas pneumonia  -Completed severe COVID pathway with decadron (ended 1/22) and remdesivir (ended 1/20), also completed 7 days CXT on 1/15  -C/f opportunistic infections given immunocompromised status on chemotherapy and recent steroid use  -No consolidations on CXR and procal negative  -S/p bactrim and minocycline x5 days for stenotrophomonas on sputum culture (1/25), then on bactrim for PJP ppx  -Bronc on 2/2 - Cx w/o growth (initially resulted as 1+ alpha hemolytic strep), AFB & PCP negative, f/u fungal, legionella, and viral cultures   -2/10 pt again had escalating O2 requirements on floors necessitating readmission to ICU  -Concern that with recent pulse dose steroids and now worsening respiratory status she could have infection, possibly opportunistic   -Repeat sputum culture 2/9 with 4+ stenotrophomonas  -On high dose bactrim, day 4 today        MSK/Skin:   No active issues    Disposition: Critical care    ICU Core Measures     A: Assess, Prevent, and Manage Pain Has pain been assessed? Yes  Need for changes to pain regimen? No   B: Both SAT/SAT  N/A   C: Choice of Sedation RASS Goal: N/A patient not on sedation  Need for changes to sedation or analgesia regimen? NA   D: Delirium CAM-ICU: Negative   E: Early Mobility  Plan for early mobility? Yes   F: Family Engagement Plan for family engagement today? Yes       Antibiotic Review: Patient on appropriate coverage based on culture data.     Review of Invasive Devices:            Prophylaxis:  VTE VTE covered by:  enoxaparin, Subcutaneous, 40 mg at 02/12/24 0845       Stress Ulcer  not ordered        Significant 24hr Events     24hr events: Noted to have epistaxis overnight, was given afrin for this.      Subjective   Patient reporting of having a headache this morning. Reports that nose bleed stopped this morning and that she is is  able to use the high flow cannula without too much discomfort at this time.     Review of Systems   Constitutional:  Positive for activity change. Negative for chills and fever.   Respiratory:  Positive for shortness of breath.    Cardiovascular:  Negative for chest pain.   Gastrointestinal:  Negative for abdominal pain, nausea and vomiting.   Neurological:  Positive for weakness. Negative for light-headedness.        Objective                            Vitals I/O      Most Recent Min/Max in 24hrs   Temp 98.4 °F (36.9 °C) Temp  Min: 98 °F (36.7 °C)  Max: 98.5 °F (36.9 °C)   Pulse 84 Pulse  Min: 72  Max: 109   Resp 20 Resp  Min: 19  Max: 38   /68 BP  Min: 94/50  Max: 147/71   O2 Sat 92 % SpO2  Min: 83 %  Max: 99 %      Intake/Output Summary (Last 24 hours) at 2/13/2024 0739  Last data filed at 2/13/2024 0500  Gross per 24 hour   Intake 890 ml   Output 2780 ml   Net -1890 ml       Diet Regular; Regular House    Invasive Monitoring   Arterial Line  [place] BP    No data recorded   MAP    No data recorded           Physical Exam   Physical Exam  Skin:     General: Skin is warm.      Comments: Warm to touch.   HENT:      Head: Normocephalic.   Cardiovascular:      Rate and Rhythm: Normal rate and regular rhythm.      Pulses: Normal pulses.   Abdominal: General: Bowel sounds are normal.      Palpations: Abdomen is soft.   Constitutional:       Appearance: She is well-developed. She is ill-appearing.      Comments: Has dried blood around nares, high flow nasal cannula at around 56-58 LPM. Patient seen lying in bed.    Pulmonary:      Effort: Pulmonary effort is normal.   Neurological:      Mental Status: She is alert and oriented to person, place and time.      Comments: Able to follow commands, able to move all extremities spontaneously.             Diagnostic Studies      EKG: Reviewed  Imaging: Reviewed I have personally reviewed pertinent reports.       Medications:  Scheduled PRN   carbamide peroxide, 5 drop,  BID  enoxaparin, 40 mg, Daily  immune globulin, human, 400 mg/kg (Adjusted), Daily  insulin lispro, 1-5 Units, 4x Daily (AC & HS)  insulin lispro, 2 Units, TID With Meals  lamoTRIgine, 150 mg, BID  melatonin, 6 mg, HS  methylPREDNISolone sodium succinate, 250 mg, Q12H SARTHAK  nystatin, , BID  polyethylene glycol, 17 g, Daily  QUEtiapine, 12.5 mg, HS  sulfamethoxazole-trimethoprim, 5 mg/kg of trimethoprim, TID  topiramate, 100 mg, BID  tranexamic acid, 500 mg, Once  venlafaxine, 25 mg, TID With Meals      acetaminophen, 650 mg, Q6H PRN  bisacodyl, 10 mg, Daily PRN  calcium carbonate, 500 mg, BID PRN  metoprolol, 2.5 mg, Q6H PRN  ondansetron, 4 mg, Q6H PRN  senna-docusate sodium, 2 tablet, BID PRN  sodium chloride, 1 spray, Q1H PRN       Continuous    epoprostenol, 6.25-50 ng/kg/min (Ideal), Last Rate: 50 ng/kg/min (02/13/24 0341)         Labs:    CBC    Recent Labs     02/12/24 0447 02/13/24  0552   WBC 9.11 7.29   HGB 10.5* 10.1*   HCT 32.5* 31.3*    173     BMP    Recent Labs     02/12/24 0447 02/13/24  0552   SODIUM 137 135   K 4.0 5.0    106   CO2 23 23   AGAP 9 6   BUN 31* 31*   CREATININE 0.91 0.95   CALCIUM 9.6 9.1       Coags    No recent results     Additional Electrolytes  Recent Labs     02/12/24 0447 02/13/24  0552   MG 2.0 2.1   PHOS 3.3  --           Blood Gas    No recent results  No recent results LFTs  Recent Labs     02/12/24 0447   ALT 35   AST 22   ALKPHOS 72   ALB 2.9*   TBILI 0.29       Infectious  No recent results  Glucose  Recent Labs     02/12/24 0447 02/13/24  0552   GLUC 95 160*             Miguel Whittaker MD

## 2024-02-13 NOTE — OCCUPATIONAL THERAPY NOTE
Occupational Therapy Treatment Note      Carla Torresisak    2024    Principal Problem:    Acute respiratory failure with hypoxia (HCC)  Active Problems:    Anxiety state    COVID    Stage 3b chronic kidney disease (CKD) (HCC)    Teto marginal zone B-cell lymphoma (HCC)    Seizure disorder (HCC)    Pneumonia of both lungs due to infectious organism    Hypotension      Past Medical History:   Diagnosis Date    Hx of hypercalcemia     Lymphoma (HCC) 2021    Parathyroid disease (HCC)     Seizures (HCC)     Situational depression     24 yr old son  from drug overdose       Past Surgical History:   Procedure Laterality Date    CRANIOTOMY FOR TEMPORAL LOBECTOMY Left 24 1125   OT Last Visit   OT Visit Date 24   Note Type   Note Type Treatment   Pain Assessment   Pain Assessment Tool 0-10   Pain Score No Pain   Restrictions/Precautions   Weight Bearing Precautions Per Order No   Other Precautions Cognitive;Chair Alarm;Bed Alarm;Multiple lines;Telemetry;Fall Risk;Pain;O2  (HFNC and NRB)   Lifestyle   Autonomy I adls and mobility - i iadls - shares homemaking with family   Reciprocal Relationships supportive family   Service to Others volunteers at [employer] organization   Intrinsic Gratification sedentary   ADL   LB Dressing Assistance 4  Minimal Assistance   LB Dressing Deficit Setup;Thread RLE into underwear;Thread LLE into underwear;Pull up over hips   Toileting Assistance  3  Moderate Assistance   Toileting Deficit Setup;Steadying;Increased time to complete;Use of bedpan/urinal setup;Perineal hygiene;Clothing management up;Clothing management down   Toileting Comments pt used bed pan seated on bedside recliner; Min A for standing and hygiene 2/2 poor respiratory status w/ activity.   Bed Mobility   Supine to Sit 5  Supervision   Additional items HOB elevated;Increased time required;Verbal cues   Sit to Supine Unable to assess   Additional Comments pt sat EOB w/ Fair sitting balance/trunk  control   Transfers   Sit to Stand 4  Minimal assistance   Additional items Assist x 1;Increased time required;Verbal cues   Stand to Sit 4  Minimal assistance   Additional items Assist x 1;Increased time required;Verbal cues   Toilet transfer 4  Minimal assistance   Additional items Assist x 1  (bed pan; deferred use of BSC 2/2 poor activity tolerance.)   Additional Comments pt performed STS from EOB and recliner w/ Min A x1; HHA used.   Functional Mobility   Functional Mobility 4  Minimal assistance   Additional Comments pt took few small steps from EOB to recliner w/ Min A x1; HHA used.Limited by poor respiratory status; Placed on NRB while still supine in bed and kept it on throughout tx session + HFNC. Pt desat into 60s w/ minimal activity and 2 steps to chair. Requires several minutes to improve O2 sat to mid 80s w/ VC for deep breathing. Limited by poor respiratory status w/ activity.   Additional items Hand hold assistance   Cognition   Overall Cognitive Status Impaired   Arousal/Participation Responsive;Cooperative   Attention Within functional limits   Orientation Level Oriented X4   Memory Decreased recall of precautions   Following Commands Follows all commands and directions without difficulty   Comments pt is pleasant and cooperative;needs occasional safety cues   Activity Tolerance   Activity Tolerance Patient limited by fatigue   Medical Staff Made Aware Tania LEONE and Alex ORANTES, RN, Lilly   Assessment   Assessment Patient participated in Skilled OT session 2/13/2024 with interventions consisting of ADL re training with the use of correct body mechnaics, Energy Conservation techniques, deep breathing technique, safety awareness and fall prevention techniques,  therapeutic activities to: increase activity tolerance, increase postural control, increase trunk control, and increase OOB/ sitting tolerance . Patient agreeable to OT treatment session, upon arrival patient was found supine in bed.  In  comparison to previous session, patient with improvements in toileting and bed mobility. Patient requiring verbal cues for pacing thru activity steps and frequent rest periods. Patient continues to be functioning below baseline level, occupational performance remains limited secondary to factors listed above and increased risk for falls and injury.   From OT standpoint, recommendation at time of d/c would be Home OT,pending progress.   Patient to benefit from continued Occupational Therapy treatment while in the hospital to address deficits as defined above and maximize level of functional independence with ADLs and functional mobility.   Plan   Treatment Interventions ADL retraining;Functional transfer training;UE strengthening/ROM;Endurance training;Cognitive reorientation;Patient/family training;Equipment evaluation/education;Compensatory technique education;Continued evaluation;Activityengagement;Energy conservation   Goal Expiration Date 02/27/24  (extension of previously established goals additional 14 days)   OT Treatment Day 6   OT Frequency 3-5x/wk   Discharge Recommendation   Rehab Resource Intensity Level, OT III (Minimum Resource Intensity)   Equipment Recommended Shower/Tub chair with back ($)   Additional Comments  The patient's raw score on the AM-PAC Daily Activity Inpatient Short Form is 18. A raw score of less than 19 suggests the patient may benefit from discharge to post-acute rehabilitation services. Please refer to the recommendation of the Occupational Therapist for safe discharge planning.   Additional Comments 2 Pt seen as a co-session due to the patient's co-morbidities, inability to tolerate multiple therapy sessions, clinically unstable presentation, and present impairments which are a regression from the patient's baseline.   -[facility] Daily Activity Inpatient   Lower Body Dressing 3   Bathing 3   Toileting 2   Upper Body Dressing 3   Grooming 3   Eating 4   Daily Activity Raw Score 18    Daily Activity Standardized Score (Calc for Raw Score >=11) 38.66   AM-PAC Applied Cognition Inpatient   Following a Speech/Presentation 3   Understanding Ordinary Conversation 4   Taking Medications 4   Remembering Where Things Are Placed or Put Away 4   Remembering List of 4-5 Errands 3   Taking Care of Complicated Tasks 3   Applied Cognition Raw Score 21   Applied Cognition Standardized Score 44.3   End of Consult   Education Provided Yes   Patient Position at End of Consult Bedside chair;Bed/Chair alarm activated;All needs within reach   Nurse Communication Nurse aware of consult     Felisha Wilkerson MS, OTR/L

## 2024-02-13 NOTE — PLAN OF CARE
Problem: PHYSICAL THERAPY ADULT  Goal: Performs mobility at highest level of function for planned discharge setting.  See evaluation for individualized goals.  Description:    Equipment Recommended:  (none)       See flowsheet documentation for full assessment, interventions and recommendations.  2/13/2024 1545 by Karlene Mata  Outcome: Progressing  Note: Prognosis: Good  Problem List: Decreased strength, Decreased endurance, Impaired balance, Decreased mobility, Decreased safety awareness, Pain  Assessment: Pt is a 59 y.o. female presenting to [facility] on  1/10/2024 1941 w/ primary complaint of acute respiratory failure with hypoxia and COVID-19. Pt  has a past medical history of hypercalcemia, Lymphoma (HCC), Parathyroid disease (HCC), Seizures (HCC), and Situational depression. Pt has had a prolonged hospital stay (due to medical complications) and currently requires 56-58L hi-migue. Pt did tolerate treatment well today. Pt was able to perform a supine to sit w/ min assist x 1 (required extra time). Pt required a seated rest break w/ breathing cues in order to prevent a decrease in 02 levels. Pt was able to perform a sit to stand w/ min assist x 1 (required extra time and cueing to extend knees fully). Pt was able to ambulate 3 ft from the bed to the recliner w/ min assist x 1 (sat on bed pan in chair, tolerated ambulation well). O2 dropped to 75 after ambulation, but quickly improved after a few minutes of seated chair rest. Pt would continue to benefit from skilled PT. Pt will continue to be seen upon admission. Pt's prognosis is Good secondary to age, PLOF, and PMH. The patient's -[facility] Basic Mobility Inpatient Standardized Score is less than 42.9, suggesting this patient may benefit from discharge to post-acute rehabilitation services. Please also refer to the recommendation of the Physical Therapist for safe discharge planning.  Barriers to Discharge: None     Rehab Resource Intensity Level, PT:  II (Moderate Resource Intensity)    See flowsheet documentation for full assessment.

## 2024-02-13 NOTE — UTILIZATION REVIEW
Continued Stay Review    Date: 2/1/3/24                           Current Patient Class: IP  Current Level of Care: Level 2 Stepdown / HOT     HPI:59 y.o. female initially admitted on 1/10/24 - DX:  Acute respiratory failure with hypoxia  / Severe covid pneumonia and stenotrophomonas pneumonia     Assessment/Plan:   2/13/24: Noted to have epistaxis overnight, was given afrin for this.  Has dried blood around nares, high flow nasal cannula at around 56-58 LPM.   Plan: cont epoprostenol (Veletri) gtt; cont solumedrol iv; rec'd ofirmev iv x1; cont immune globin (Gamunex-C) iv (day 3 today); monitor labs; Accuchecks with [facility]      Vital Signs:   Date/Time Temp Pulse Resp BP MAP (mmHg) SpO2 FiO2 (%) Calculated FIO2 (%) - Nasal Cannula O2 Flow Rate (L/min) Nasal Cannula O2 Flow Rate (L/min) O2 Device O2 Interface Device Patient Position - Orthostatic VS   02/13/24 1127 -- 92 26 Abnormal  -- -- 89 % Abnormal  -- -- -- -- High flow nasal cannula  -- --   O2 Device: with NRM 15L at 02/13/24 1127   02/13/24 1126 -- 97 18 -- -- 88 % Abnormal  -- -- -- -- -- -- --   02/13/24 1125 -- 106 Abnormal  43 Abnormal  -- -- 65 % Abnormal  -- -- -- -- High flow nasal cannula -- --   02/13/24 1100 -- 84 24 Abnormal  123/66 90 96 % -- -- -- -- -- -- --   02/13/24 1030 -- 81 19 -- -- 96 % -- -- -- -- -- -- --   02/13/24 1000 -- 77 20 133/65 93 99 % -- -- -- -- -- -- --   02/13/24 0840 -- 81 27 Abnormal  -- -- 93 % 95 -- 55 L/min -- High flow nasal cannula -- --   02/13/24 0835 -- 89 24 Abnormal  -- -- 87 % Abnormal  95 -- 55 L/min -- High flow nasal cannula -- --   02/13/24 0806 98.2 °F (36.8 °C) 81 12 119/59 82 87 % Abnormal  85 -- 55 L/min -- High flow nasal cannula -- Lying   02/13/24 0730 -- 75 22 -- -- 94 % -- -- -- -- -- -- --   02/13/24 0721 -- -- -- -- -- -- -- -- -- -- -- HFNC prongs --   02/13/24 0700 -- 84 20 131/68 94 92 % -- -- -- -- -- -- --   02/13/24 0600 -- 78 23 Abnormal  125/71 93 91 % -- -- -- -- -- -- --   02/13/24 0500  -- 73 19 147/71 102 96 % -- -- -- -- -- -- --   02/13/24 0400 98.4 °F (36.9 °C) 73 20 134/61 88 99 % -- -- -- -- -- -- --   02/13/24 0300 -- 72 20 135/61 88 99 % -- -- -- -- -- -- --   02/13/24 0214 -- -- -- -- -- -- -- -- -- -- -- HFNC prongs --   02/13/24 0200 -- 75 22 111/55 79 92 % -- -- -- -- -- -- --   02/13/24 0100 -- 77 20 138/66 95 98 % -- -- -- -- -- -- --   02/13/24 0000 -- 75 25 Abnormal  117/59 83 97 %              Pertinent Labs/Diagnostic Results:     Results from last 7 days   Lab Units 02/13/24  0552 02/12/24  0447 02/11/24  0450 02/10/24  0554 02/09/24  0832   WBC Thousand/uL 7.29 9.11 8.24 8.71 6.63   HEMOGLOBIN g/dL 10.1* 10.5* 10.8* 11.3* 11.4*   HEMATOCRIT % 31.3* 32.5* 34.4* 35.0 36.0   PLATELETS Thousands/uL 173 186 165 137* 157   BANDS PCT % 3  --   --   --  8       Results from last 7 days   Lab Units 02/13/24  0552 02/12/24  0447 02/11/24  0450 02/10/24  0554 02/09/24  0628   SODIUM mmol/L 135 137 139 141 147   POTASSIUM mmol/L 5.0 4.0 4.2 4.1 3.7   CHLORIDE mmol/L 106 105 105 105 111*   CO2 mmol/L 23 23 26 26 26   ANION GAP mmol/L 6 9 8 10 10   BUN mg/dL 31* 31* 31* 27* 32*   CREATININE mg/dL 0.95 0.91 0.88 0.91 1.04   EGFR ml/min/1.73sq m 66 69 72 69 59   CALCIUM mg/dL 9.1 9.6 9.5 9.5 9.7   MAGNESIUM mg/dL 2.1 2.0 2.1  --   --    PHOSPHORUS mg/dL  --  3.3 3.1  --   --      Results from last 7 days   Lab Units 02/12/24  0447   AST U/L 22   ALT U/L 35   ALK PHOS U/L 72   TOTAL PROTEIN g/dL 5.9*   ALBUMIN g/dL 2.9*   TOTAL BILIRUBIN mg/dL 0.29     Results from last 7 days   Lab Units 02/13/24  0805 02/12/24  2228 02/12/24  1722 02/12/24  1126 02/12/24  0758 02/11/24  2150 02/11/24  1632 02/11/24  1145 02/11/24  0820 02/11/24  0622 02/10/24  2128 02/10/24  1704   POC GLUCOSE mg/dl 155* 103 205* 115 85 270* 191* 164* 151* 153* 268* 253*     Results from last 7 days   Lab Units 02/13/24  0552 02/12/24  0447 02/11/24  0450 02/10/24  0554 02/09/24  0628 02/08/24  0504   GLUCOSE RANDOM mg/dL 160*  95 135 176* 100 122       Results from last 7 days   Lab Units 02/08/24  0504   PROCALCITONIN ng/ml 0.06       Results from last 7 days   Lab Units 02/11/24  0450 02/09/24  1717   BNP pg/mL 36 75       Results from last 7 days   Lab Units 02/12/24  0447 02/11/24  0450 02/08/24  0504   CRP mg/L 21.0* 31.6* 1.0       Results from last 7 days   Lab Units 02/09/24  1203   SPUTUM CULTURE  4+ Growth of Stenotrophomonas maltophilia*  2+ Growth of   GRAM STAIN RESULT  No Epithelial cells per low power field*  No Polys*  4+ Gram negative rods*  Rare Gram positive cocci in clusters*       Medications:   Scheduled Medications:  carbamide peroxide, 5 drop, Both Ears, BID  enoxaparin, 40 mg, Subcutaneous, Daily  immune globulin, human, 400 mg/kg (Adjusted), Intravenous, Daily  insulin lispro, 1-5 Units, Subcutaneous, 4x Daily (AC & HS)  insulin lispro, 2 Units, Subcutaneous, TID With Meals  lamoTRIgine, 150 mg, Oral, BID  melatonin, 6 mg, Oral, HS  methylPREDNISolone sodium succinate, 125 mg, Intravenous, Q12H SARTHAK  nystatin, , Topical, BID  polyethylene glycol, 17 g, Oral, Daily  QUEtiapine, 12.5 mg, Oral, HS  sulfamethoxazole-trimethoprim, 5 mg/kg of trimethoprim, Oral, TID  topiramate, 100 mg, Oral, BID  tranexamic acid, 500 mg, Nasal, Once  venlafaxine, 25 mg, Oral, TID With Meals    acetaminophen (Ofirmev) injection 1,000 mg  Dose: 1,000 mg  Freq: Once Route: IV  Last Dose: Stopped (02/13/24 0821)  Start: 02/13/24 0800 End: 02/13/24 0821     Continuous IV Infusions:  epoprostenol, 6.25-50 ng/kg/min (Ideal), Inhalation, Titrated      PRN Meds:  acetaminophen, 650 mg, Oral, Q6H PRN  bisacodyl, 10 mg, Rectal, Daily PRN  calcium carbonate, 500 mg, Oral, BID PRN  metoprolol, 2.5 mg, Intravenous, Q6H PRN  ondansetron, 4 mg, Intravenous, Q6H PRN  senna-docusate sodium, 2 tablet, Oral, BID PRN  sodium chloride, 1 spray, Each Nare, Q1H PRN        Discharge Plan: TBD    Network Utilization Review Department  ATTENTION: Please call  with any questions or concerns to [phone] and carefully listen to the prompts so that you are directed to the right person. All voicemails are confidential.   For Discharge needs, contact Care Management DC Support Team at [phone] opt. 2  Send all requests for admission clinical reviews, approved or denied determinations and any other requests to dedicated fax number below belonging to the campus where the patient is receiving treatment. List of dedicated fax numbers for the Facilities:  FACILITY NAME UR FAX NUMBER   ADMISSION DENIALS (Administrative/Medical Necessity) [phone]   DISCHARGE SUPPORT TEAM (NETWORK) [phone]   PARENT CHILD HEALTH (Maternity/NICU/Pediatrics) [phone]   [facility] [phone]   General acute hospital [phone]   [facility] [phone]   [facility] [phone]   [facility] [phone]   [facility] [phone]   [facility] [phone]   [facility] [phone]   [facility] [phone]   [facility] [phone]   [facility] [phone]   [facility] [phone]

## 2024-02-14 PROBLEM — Z71.89 GOALS OF CARE, COUNSELING/DISCUSSION: Status: ACTIVE | Noted: 2024-02-14

## 2024-02-14 PROBLEM — Z51.5 PALLIATIVE CARE PATIENT: Status: ACTIVE | Noted: 2024-02-14

## 2024-02-14 LAB
ANION GAP SERPL CALCULATED.3IONS-SCNC: 7 MMOL/L
BASE EXCESS BLDA CALC-SCNC: -2.4 MMOL/L
BUN SERPL-MCNC: 33 MG/DL (ref 5–25)
C-ANCA TITR SER IF: NORMAL TITER
CALCIUM SERPL-MCNC: 9.6 MG/DL (ref 8.4–10.2)
CHLORIDE SERPL-SCNC: 104 MMOL/L (ref 96–108)
CO2 SERPL-SCNC: 22 MMOL/L (ref 21–32)
CREAT SERPL-MCNC: 0.99 MG/DL (ref 0.6–1.3)
CRP SERPL QL: 15.8 MG/L
ERYTHROCYTE [DISTWIDTH] IN BLOOD BY AUTOMATED COUNT: 17.8 % (ref 11.6–15.1)
GFR SERPL CREATININE-BSD FRML MDRD: 63 ML/MIN/1.73SQ M
GLUCOSE SERPL-MCNC: 110 MG/DL (ref 65–140)
GLUCOSE SERPL-MCNC: 119 MG/DL (ref 65–140)
GLUCOSE SERPL-MCNC: 123 MG/DL (ref 65–140)
GLUCOSE SERPL-MCNC: 94 MG/DL (ref 65–140)
HCO3 BLDA-SCNC: 22.4 MMOL/L (ref 22–28)
HCT VFR BLD AUTO: 30.2 % (ref 34.8–46.1)
HGB BLD-MCNC: 10 G/DL (ref 11.5–15.4)
MAGNESIUM SERPL-MCNC: 2.2 MG/DL (ref 1.9–2.7)
MCH RBC QN AUTO: 30.1 PG (ref 26.8–34.3)
MCHC RBC AUTO-ENTMCNC: 33.1 G/DL (ref 31.4–37.4)
MCV RBC AUTO: 91 FL (ref 82–98)
MYELOPEROXIDASE AB SER IA-ACNC: <0.2 UNITS (ref 0–0.9)
O2 CT BLDA-SCNC: 15 ML/DL (ref 16–23)
OXYHGB MFR BLDA: 96.9 % (ref 94–97)
P-ANCA ATYPICAL TITR SER IF: NORMAL TITER
P-ANCA TITR SER IF: NORMAL TITER
PCO2 BLDA: 38.5 MM HG (ref 36–44)
PH BLDA: 7.38 [PH] (ref 7.35–7.45)
PLATELET # BLD AUTO: 202 THOUSANDS/UL (ref 149–390)
PMV BLD AUTO: 11.1 FL (ref 8.9–12.7)
PO2 BLDA: 107.8 MM HG (ref 75–129)
POTASSIUM SERPL-SCNC: 5.2 MMOL/L (ref 3.5–5.3)
PROTEINASE3 AB SER IA-ACNC: <0.2 UNITS (ref 0–0.9)
RBC # BLD AUTO: 3.32 MILLION/UL (ref 3.81–5.12)
SIMV VENT INSPIRED AIR FIO2: 70
SIMV VENT PC: 8
SIMV VENT PEEP: 8
SIMV VENT: ABNORMAL
SODIUM SERPL-SCNC: 133 MMOL/L (ref 135–147)
SPECIMEN SOURCE: ABNORMAL
VENT SIMV: 14
WBC # BLD AUTO: 9.36 THOUSAND/UL (ref 4.31–10.16)

## 2024-02-14 PROCEDURE — 4A133B1 MONITORING OF ARTERIAL PRESSURE, PERIPHERAL, PERCUTANEOUS APPROACH: ICD-10-PCS | Performed by: STUDENT IN AN ORGANIZED HEALTH CARE EDUCATION/TRAINING PROGRAM

## 2024-02-14 PROCEDURE — 99291 CRITICAL CARE FIRST HOUR: CPT | Performed by: INTERNAL MEDICINE

## 2024-02-14 PROCEDURE — 80048 BASIC METABOLIC PNL TOTAL CA: CPT | Performed by: STUDENT IN AN ORGANIZED HEALTH CARE EDUCATION/TRAINING PROGRAM

## 2024-02-14 PROCEDURE — 03HY32Z INSERTION OF MONITORING DEVICE INTO UPPER ARTERY, PERCUTANEOUS APPROACH: ICD-10-PCS | Performed by: STUDENT IN AN ORGANIZED HEALTH CARE EDUCATION/TRAINING PROGRAM

## 2024-02-14 PROCEDURE — NC001 PR NO CHARGE: Performed by: STUDENT IN AN ORGANIZED HEALTH CARE EDUCATION/TRAINING PROGRAM

## 2024-02-14 PROCEDURE — 99255 IP/OBS CONSLTJ NEW/EST HI 80: CPT | Performed by: INTERNAL MEDICINE

## 2024-02-14 PROCEDURE — 4A133J1 MONITORING OF ARTERIAL PULSE, PERIPHERAL, PERCUTANEOUS APPROACH: ICD-10-PCS | Performed by: STUDENT IN AN ORGANIZED HEALTH CARE EDUCATION/TRAINING PROGRAM

## 2024-02-14 PROCEDURE — 82805 BLOOD GASES W/O2 SATURATION: CPT | Performed by: STUDENT IN AN ORGANIZED HEALTH CARE EDUCATION/TRAINING PROGRAM

## 2024-02-14 PROCEDURE — C9113 INJ PANTOPRAZOLE SODIUM, VIA: HCPCS

## 2024-02-14 PROCEDURE — 94644 CONT INHLJ TX 1ST HOUR: CPT

## 2024-02-14 PROCEDURE — 87205 SMEAR GRAM STAIN: CPT

## 2024-02-14 PROCEDURE — 85027 COMPLETE CBC AUTOMATED: CPT | Performed by: STUDENT IN AN ORGANIZED HEALTH CARE EDUCATION/TRAINING PROGRAM

## 2024-02-14 PROCEDURE — 94645 CONT INHLJ TX EACH ADDL HOUR: CPT

## 2024-02-14 PROCEDURE — 94003 VENT MGMT INPAT SUBQ DAY: CPT

## 2024-02-14 PROCEDURE — 82948 REAGENT STRIP/BLOOD GLUCOSE: CPT

## 2024-02-14 PROCEDURE — 94760 N-INVAS EAR/PLS OXIMETRY 1: CPT

## 2024-02-14 PROCEDURE — 36620 INSERTION CATHETER ARTERY: CPT | Performed by: STUDENT IN AN ORGANIZED HEALTH CARE EDUCATION/TRAINING PROGRAM

## 2024-02-14 PROCEDURE — 87070 CULTURE OTHR SPECIMN AEROBIC: CPT

## 2024-02-14 PROCEDURE — 99233 SBSQ HOSP IP/OBS HIGH 50: CPT | Performed by: INTERNAL MEDICINE

## 2024-02-14 PROCEDURE — 86140 C-REACTIVE PROTEIN: CPT

## 2024-02-14 PROCEDURE — 83735 ASSAY OF MAGNESIUM: CPT | Performed by: STUDENT IN AN ORGANIZED HEALTH CARE EDUCATION/TRAINING PROGRAM

## 2024-02-14 RX ORDER — HYDROMORPHONE HCL/PF 1 MG/ML
0.5 SYRINGE (ML) INJECTION
Status: DISCONTINUED | OUTPATIENT
Start: 2024-02-14 | End: 2024-02-19

## 2024-02-14 RX ORDER — INSULIN LISPRO 100 [IU]/ML
1-5 INJECTION, SOLUTION INTRAVENOUS; SUBCUTANEOUS EVERY 6 HOURS SCHEDULED
Status: DISCONTINUED | OUTPATIENT
Start: 2024-02-14 | End: 2024-02-19

## 2024-02-14 RX ORDER — FAMOTIDINE 40 MG/5ML
20 POWDER, FOR SUSPENSION ORAL DAILY
Status: DISCONTINUED | OUTPATIENT
Start: 2024-02-14 | End: 2024-02-16

## 2024-02-14 RX ORDER — METHYLPREDNISOLONE SODIUM SUCCINATE 125 MG/2ML
60 INJECTION, POWDER, LYOPHILIZED, FOR SOLUTION INTRAMUSCULAR; INTRAVENOUS EVERY 12 HOURS SCHEDULED
Status: DISCONTINUED | OUTPATIENT
Start: 2024-02-15 | End: 2024-02-16

## 2024-02-14 RX ORDER — VENLAFAXINE HYDROCHLORIDE 75 MG/1
75 CAPSULE, EXTENDED RELEASE ORAL DAILY
Status: DISCONTINUED | OUTPATIENT
Start: 2024-02-14 | End: 2024-02-14

## 2024-02-14 RX ORDER — VENLAFAXINE 25 MG/1
25 TABLET ORAL
Status: DISCONTINUED | OUTPATIENT
Start: 2024-02-14 | End: 2024-02-20

## 2024-02-14 RX ADMIN — TOPIRAMATE 100 MG: 100 TABLET, FILM COATED ORAL at 18:51

## 2024-02-14 RX ADMIN — Medication 1 APPLICATION: at 10:09

## 2024-02-14 RX ADMIN — VENLAFAXINE 25 MG: 25 TABLET ORAL at 12:29

## 2024-02-14 RX ADMIN — Medication 1 APPLICATION: at 16:09

## 2024-02-14 RX ADMIN — PROPOFOL 30 MCG/KG/MIN: 10 INJECTION, EMULSION INTRAVENOUS at 22:28

## 2024-02-14 RX ADMIN — Medication 1 APPLICATION: at 12:28

## 2024-02-14 RX ADMIN — QUETIAPINE FUMARATE 12.5 MG: 25 TABLET ORAL at 22:28

## 2024-02-14 RX ADMIN — Medication 1 APPLICATION: at 08:00

## 2024-02-14 RX ADMIN — HYDROMORPHONE HYDROCHLORIDE 0.5 MG: 1 INJECTION, SOLUTION INTRAMUSCULAR; INTRAVENOUS; SUBCUTANEOUS at 12:26

## 2024-02-14 RX ADMIN — Medication 1 SPRAY: at 16:09

## 2024-02-14 RX ADMIN — Medication 1 SPRAY: at 18:00

## 2024-02-14 RX ADMIN — Medication 50 MCG/HR: at 06:38

## 2024-02-14 RX ADMIN — PROPOFOL 40 MCG/KG/MIN: 10 INJECTION, EMULSION INTRAVENOUS at 10:07

## 2024-02-14 RX ADMIN — TOPIRAMATE 100 MG: 100 TABLET, FILM COATED ORAL at 10:00

## 2024-02-14 RX ADMIN — EPOPROSTENOL 25.04 NG/KG/MIN: 1.5 INJECTION, POWDER, LYOPHILIZED, FOR SOLUTION INTRAVENOUS at 10:56

## 2024-02-14 RX ADMIN — Medication 1 APPLICATION: at 13:41

## 2024-02-14 RX ADMIN — Medication 5 DROP: at 10:00

## 2024-02-14 RX ADMIN — METHYLPREDNISOLONE SODIUM SUCCINATE 125 MG: 125 INJECTION, POWDER, FOR SOLUTION INTRAMUSCULAR; INTRAVENOUS at 10:00

## 2024-02-14 RX ADMIN — Medication 1 SPRAY: at 22:28

## 2024-02-14 RX ADMIN — POLYETHYLENE GLYCOL 3350 17 G: 17 POWDER, FOR SOLUTION ORAL at 10:00

## 2024-02-14 RX ADMIN — Medication 1 APPLICATION: at 05:51

## 2024-02-14 RX ADMIN — Medication 1 SPRAY: at 08:00

## 2024-02-14 RX ADMIN — Medication 1 SPRAY: at 19:55

## 2024-02-14 RX ADMIN — Medication 27.5 G: at 10:19

## 2024-02-14 RX ADMIN — METHYLPREDNISOLONE SODIUM SUCCINATE 125 MG: 125 INJECTION, POWDER, FOR SOLUTION INTRAMUSCULAR; INTRAVENOUS at 20:48

## 2024-02-14 RX ADMIN — FAMOTIDINE 20 MG: 40 POWDER, FOR SUSPENSION ORAL at 15:03

## 2024-02-14 RX ADMIN — PANTOPRAZOLE SODIUM 40 MG: 40 INJECTION, POWDER, FOR SOLUTION INTRAVENOUS at 10:00

## 2024-02-14 RX ADMIN — Medication 1 SPRAY: at 05:51

## 2024-02-14 RX ADMIN — CHLORHEXIDINE GLUCONATE 0.12% ORAL RINSE 15 ML: 1.2 LIQUID ORAL at 10:00

## 2024-02-14 RX ADMIN — PROPOFOL 35 MCG/KG/MIN: 10 INJECTION, EMULSION INTRAVENOUS at 06:06

## 2024-02-14 RX ADMIN — LAMOTRIGINE 150 MG: 100 TABLET ORAL at 10:00

## 2024-02-14 RX ADMIN — Medication 1 APPLICATION: at 22:28

## 2024-02-14 RX ADMIN — Medication 1 SPRAY: at 12:28

## 2024-02-14 RX ADMIN — Medication 1 APPLICATION: at 19:55

## 2024-02-14 RX ADMIN — VENLAFAXINE 25 MG: 25 TABLET ORAL at 16:10

## 2024-02-14 RX ADMIN — EPOPROSTENOL 50 NG/KG/MIN: 1.5 INJECTION, POWDER, LYOPHILIZED, FOR SOLUTION INTRAVENOUS at 03:09

## 2024-02-14 RX ADMIN — CHLORHEXIDINE GLUCONATE 0.12% ORAL RINSE 15 ML: 1.2 LIQUID ORAL at 20:43

## 2024-02-14 RX ADMIN — Medication 1 SPRAY: at 10:09

## 2024-02-14 RX ADMIN — Medication 1 SPRAY: at 13:41

## 2024-02-14 RX ADMIN — SULFAMETHOXAZOLE AND TRIMETHOPRIM 2.5 TABLET: 800; 160 TABLET ORAL at 16:11

## 2024-02-14 RX ADMIN — ENOXAPARIN SODIUM 40 MG: 40 INJECTION SUBCUTANEOUS at 10:00

## 2024-02-14 RX ADMIN — Medication 1 APPLICATION: at 18:00

## 2024-02-14 RX ADMIN — NYSTATIN 11 APPLICATION: 100000 POWDER TOPICAL at 18:50

## 2024-02-14 RX ADMIN — SULFAMETHOXAZOLE AND TRIMETHOPRIM 2.5 TABLET: 800; 160 TABLET ORAL at 20:43

## 2024-02-14 RX ADMIN — PROPOFOL 35 MCG/KG/MIN: 10 INJECTION, EMULSION INTRAVENOUS at 16:04

## 2024-02-14 RX ADMIN — NYSTATIN 1 APPLICATION: 100000 POWDER TOPICAL at 10:00

## 2024-02-14 RX ADMIN — SULFAMETHOXAZOLE AND TRIMETHOPRIM 2.5 TABLET: 800; 160 TABLET ORAL at 10:00

## 2024-02-14 RX ADMIN — LAMOTRIGINE 150 MG: 100 TABLET ORAL at 18:50

## 2024-02-14 NOTE — PROGRESS NOTES
[facility]  Progress Note: Critical Care  Name: Carla Hunt 59 y.o. female I MRN: [id number]  Unit/Bed#: MICU 10 I Date of Admission: 1/10/2024   Date of Service: 2/14/2024 I Hospital Day: 35    Assessment/Plan   Acute hypoxic respiratory failure due to severe covid/rituxan induced pneumonitis  B cell lymphoma  CKD stage III  Seizure disorder  Anxiety    Neuro:   Diagnosis: seizure disorder  -S/p partial left temporal lobe resection in 2007  -Contuinue home lamictal 150 bid and topamax 100 bid     Diagnosis: Anxiety  -Home venlafaxine placed on hold overnight, can consider slowly tapering off venlafaxine since patient has been on it for some time. Another alternative may also be if patient can be weaned off of fentanyl  -Seroquel 12.5 hs and melatonin 6 hs for sleep     CV:   Diagnosis: Hypotension, likely in the setting of ongoing sedation use  -Currently on levophed running at 2     Pulm:  Diagnosis: Acute hypoxic respiratory failure due to severe covid and rituxan induced pneumonitis  -Tested COVID positive on 1/7  -Completed severe COVID pathway and 7 days CTX  -Tenuous respiratory status requiring multiple re-admissions to MICU  -S/p Bronch 2/2   -NG on cultures (initially showed non-group A strep)  -PCP and AFB negative   -Legionella, viral, fungal cultures no growth to date  -Pulse dose steroids with solumedrol 1g daily x3 days (completed 2/7) then transitioned to prednisone 80mg daily on 2/8  -Vent settings: SIMV 14/8/60%  -Currently on solumedrol 125 mg Q12H  -CRP trend 31 (02/11)>21.0 (02/12)>15.8 (02/14). Can continue with steroid taper.  -On veletri  -On IVIG, day 4 today     GI:   -Bowel regimen with daily MiraLAX and twice daily senna  -Can start GI prophylaxis with protonix 40 mg   -Will plan on starting tube feeds at low flow rate today    :   Diagnosis: CKD III  -Baseline Cr appears to be 0.8-1.2  -UA unremarkable   -Upon chart review pt has reported  h/o Luetscher syndrome (hyperaldosteronism) though unclear how this was diagnosed, this also does not enirely fit as she is NOT hypokalemic  -Continue to monitor I/O and UOP     F/E/N:   F: none, boluses as indicated   E: monitor and replete for goal K>4, P>3, Mg>2  N: Plan to start tube feeds today     Heme/Onc:   Diagnosis: B cell lymphoma  -Follows with heme/onc at [facility]  -On rituxan for 2 year course, started May 2022  -Last dose was 12/20, treatment currently on hold   -Leukopenic on admission but WBC now wnl     DVT ppx with lovenox      Endo:   Diagnosis: steroid hyperglycemia and diabetes mellitus type 2, A1c at 6.6 from 01/2024  -SSI, hypoglycemic protocol  -On SSI  -Lispro TID with meals discontinued for now since patient NPO, we will start it once tube feeds are running  -Goal -180     ID:   Diagnosis: Severe covid pneumonia and stenotrophomonas pneumonia  -Completed severe COVID pathway with decadron (ended 1/22) and remdesivir (ended 1/20), also completed 7 days CXT on 1/15  -C/f opportunistic infections given immunocompromised status on chemotherapy and recent steroid use  -No consolidations on CXR and procal negative  -S/p bactrim and minocycline x5 days for stenotrophomonas on sputum culture (1/25), then on bactrim for PJP ppx  -Bronc on 2/2 - Cx w/o growth (initially resulted as 1+ alpha hemolytic strep), AFB & PCP negative, f/u fungal, legionella, and viral cultures   -2/10 pt again had escalating O2 requirements on floors necessitating readmission to ICU  -Concern that with recent pulse dose steroids and now worsening respiratory status she could have infection, possibly opportunistic   -Repeat sputum culture 2/9 with 4+ stenotrophomonas  -On high dose bactrim, day 5 today        MSK/Skin:   -Wound care team following for bilateral sacral wounds    Disposition: Critical care    ICU Core Measures     Vented Patient  VAP Bundle  VAP bundle ordered     A: Assess, Prevent, and Manage Pain Has  pain been assessed? Yes  Need for changes to pain regimen? No   B: Both Spontaneous Awakening Trials (SATs) and Spontaneous Breathing Trials (SBTs) Plan to perform spontaneous awakening trial today? Yes   Plan to perform spontaneous breathing trial today? Yes   Obvious barriers to extubation? Yes   C: Choice of Sedation RASS Goal: 0 Alert and Calm  Need for changes to sedation or analgesia regimen? No   D: Delirium CAM-ICU: Negative   E: Early Mobility  Plan for early mobility? Yes   F: Family Engagement Plan for family engagement today? Yes       Antibiotic Review: Patient on appropriate coverage based on culture data.     Review of Invasive Devices:    [place] Plan: Continue for accurate I/O monitoring for 48 hours    [place] Plan: Keep arterial line for hemodynamic monitoring and frequent ABGs    Prophylaxis:  VTE VTE covered by:  enoxaparin, Subcutaneous, 40 mg at 02/13/24 0822       Stress Ulcer  covered bypantoprazole (PROTONIX) injection 40 mg [[id number]]        Significant 24hr Events     24hr events: Overnight, venlafaxine was held and fentanyl drip continued. An arterial line was placed on left side for hemodynamic monitoring. Patient did not need any levophed overnight. Was placed on SIMV ventilator setting to improve breathing.      Subjective   Patient seen by bedside. Nonverbal since currently intubated. Will discuss updates and ongoing medical treatment plan with patients family today.     Review of Systems   Unable to perform ROS: Patient nonverbal (Patient currently intubated and sedated)        Objective                            Vitals I/O      Most Recent Min/Max in 24hrs   Temp 98.2 °F (36.8 °C) Temp  Min: 97.7 °F (36.5 °C)  Max: 99 °F (37.2 °C)   Pulse 73 Pulse  Min: 59  Max: 133   Resp 14 Resp  Min: 12  Max: 43   BP 95/53 BP  Min: 90/54  Max: 153/74   O2 Sat 95 % SpO2  Min: 65 %  Max: 99 %   -Vent settings: SIMV 14/8/60%  -Propofol drip 40, fentanyl drip 50  -Levophed drip 2   Intake/Output  Summary (Last 24 hours) at 2/14/2024 0712  Last data filed at 2/14/2024 0633  Gross per 24 hour   Intake 561.41 ml   Output 1935 ml   Net -1373.59 ml       Diet NPO    Invasive Monitoring   Arterial Line  [place] /53  Arterial Line BP  Min: 106/53  Max: 111/54   MAP 70 mmHg  Arterial Line MAP (mmHg)  Min: 70 mmHg  Max: 71 mmHg           Physical Exam   Physical Exam  Vitals reviewed.   Skin:     General: Skin is warm.      Capillary Refill: Capillary refill takes less than 2 seconds.      Comments: Warm to touch   HENT:      Head: Normocephalic.      Mouth/Throat:      Mouth: Mucous membranes are moist.   Cardiovascular:      Rate and Rhythm: Normal rate and regular rhythm.      Pulses: Normal pulses.   Abdominal: General: Bowel sounds are normal.      Palpations: Abdomen is soft.   Constitutional:       Interventions: She is sedated.      Comments: Patient currently intubated with ETT in place. Arterial line noted. Has a urinary catheter in place.    Pulmonary:      Effort: Pulmonary effort is normal.      Comments: Mechanically ventilated breath sounds  Neurological:      Mental Status: She is calm.      Comments: Does wake up to verbal or physical stimuli. Able to move extremities spontaneously. Does withdraw to noxious stimuli in all extremities. Is able to nod her head when attempting to respond to questions.    Genitourinary/Anorectal:  [place] present.          Diagnostic Studies      EKG: Reviewed  Imaging: Reviewed I have personally reviewed pertinent reports.       Medications:  Scheduled PRN   carbamide peroxide, 5 drop, BID  chlorhexidine, 15 mL, Q12H SARTHAK  enoxaparin, 40 mg, Daily  immune globulin, human, 400 mg/kg (Adjusted), Daily  insulin lispro, 1-5 Units, 4x Daily (AC & HS)  insulin lispro, 2 Units, TID With Meals  lamoTRIgine, 150 mg, BID  melatonin, 6 mg, HS  methylPREDNISolone sodium succinate, 125 mg, Q12H SARTHAK  nystatin, , BID  pantoprazole, 40 mg, Q24H SARTHAK  polyethylene glycol, 17 g,  Daily  QUEtiapine, 12.5 mg, HS  sodium chloride, 1 Application, Q2H  sodium chloride, 1 spray, Q2H  sulfamethoxazole-trimethoprim, 5 mg/kg of trimethoprim, TID  topiramate, 100 mg, BID  tranexamic acid, 500 mg, Once      acetaminophen, 650 mg, Q6H PRN  bisacodyl, 10 mg, Daily PRN  calcium carbonate, 500 mg, BID PRN  metoprolol, 2.5 mg, Q6H PRN  ondansetron, 4 mg, Q6H PRN  oxymetazoline, 6 spray, BID PRN  senna-docusate sodium, 2 tablet, BID PRN       Continuous    epoprostenol, 6.25-50 ng/kg/min (Ideal), Last Rate: 50 ng/kg/min (02/14/24 0309)  fentaNYL, 50 mcg/hr, Last Rate: 50 mcg/hr (02/14/24 0638)  norepinephrine, 1-30 mcg/min, Last Rate: Stopped (02/13/24 2013)  propofol, 5-100 mcg/kg/min, Last Rate: 40 mcg/kg/min (02/14/24 0614)         Labs:    CBC    Recent Labs     02/13/24  0552 02/14/24  0552   WBC 7.29 9.36   HGB 10.1* 10.0*   HCT 31.3* 30.2*    202   BANDSPCT 3  --      BMP    Recent Labs     02/13/24  0552 02/14/24  0552   SODIUM 135 133*   K 5.0 5.2    104   CO2 23 22   AGAP 6 7   BUN 31* 33*   CREATININE 0.95 0.99   CALCIUM 9.1 9.6       Coags    No recent results     Additional Electrolytes  Recent Labs     02/13/24  0552 02/14/24  0552   MG 2.1 2.2          Blood Gas    Recent Labs     02/14/24  0556   PHART 7.382   JYW6SKG 38.5   PO2ART 107.8   PHI2FAC 22.4   BEART -2.4   SOURCE Line, Arterial     Recent Labs     02/14/24  0556   SOURCE Line, Arterial    LFTs  No recent results    Infectious  No recent results  Glucose  Recent Labs     02/13/24  0552 02/14/24  0552   GLUC 160* 119             Miguel Whittaker MD

## 2024-02-14 NOTE — PROGRESS NOTES
Progress Note - Infectious Disease   Carla Hunt 59 y.o. female MRN: [id number]  [facility] [id number]      Impression/Recommendations:  Recurrent acute hypoxic respiratory failure.  Initially, on admission early January, secondary to mostly COVID but there was concern for concurrent bacterial pneumonia on admission due to elevated procalcitonin. Patient completed treatment course for both COVID and bacterial pneumonia.  She was clinically improved with decreasing O2 requirement.  However, patient deteriorated in late January, requiring to be placed back on high flow O2 support.  Chest CT more suggestive of COVID and postviral bacterial pneumonia.  Procalcitonin x 3 were in the normal range.  Patient has no fever or leukocytosis.  She improved with increasing steroid dose.  She completed a 5-day course of Bactrim/minocycline for presumptive stenotrophomonas respiratory infection, with expectorated sputum growing stenotrophomonas.  She subsequently had bronchoscopy, with BAL culture negative for bacterial growth, AFB and PCP but completed the course of Bactrim/minocycline prior to bronchoscopy.  Patient improved and O2 was being weaned again until past weekend, when she deteriorated again.  She is now back in ICU.  Expectorated sputum once again is growing stenotrophomonas.  Repeat chest CT showed stable post-COVID changes, without consolidation.  Not sure that the stenotrophomonas that is growing again and expectorated sputum is pathogenic versus upper airway colonization.  Patient is now back on high-dose steroid and Bactrim.  Patient's respiratory status remained tenuous throughout, required intubation overnight.  Now the patient is intubated, bronchoscopy would be helpful in ruling out opportunistic infections.  Continue high-dose Bactrim.  As long as patient can tolerate, treat x 14 day course this time around.  Continue high-dose systemic corticosteroid, vetetri and IVIG per critical  service  Monitor temperature/WBC.  Now that patient is intubated, recommend bronchoscopy to assess for opportunistic infections.     Severe COVID, present on admission.  Patient was treated with remdesivir, dexamethasone and was given 1 dose of Tocilizumab on admission.  She also had a course of antibiotic initially for presumptive bacterial superinfection.  Current chest CT is showing extensive fibrotic changes.  No additional antiviral needed.     CKD.  Creatinine improved.  Monitor creatinine.     Encephalopathy on admission.  This has resolved.  There was concern for possible seizure but no witnessed seizure and EEG did not capture it.  Monitor mental status.     Seizure disorder, status post temporal lobe resection in .     B-cell lymphoma, on chemotherapy, which is currently postponed.  Patient was leukopenic on admission but WBC now is near normal range.  Monitor WBC/ANC.     Discussed with patient in detail regarding the above plan.  Discussed with Dr. Cardenas from pulmonary service regarding bronchoscopy now that patient is intubated.  He is agreeable and planning to do bronchoscopy later today.     Antibiotics:  Bactrim # 5     Subjective:  Patient was intubated overnight.  Temperature stays down.  No chills.  She is tolerating antibiotic well.  No nausea, vomiting or diarrhea.       Objective:  Vitals:  Temp:  [97.7 °F (36.5 °C)-99.1 °F (37.3 °C)] 99 °F (37.2 °C)  HR:  [] 74  Resp:  [12-29] 14  BP: ()/(49-74) 90/50  SpO2:  [85 %-97 %] 94 %  Temp (24hrs), Av.5 °F (36.9 °C), Min:97.7 °F (36.5 °C), Max:99.1 °F (37.3 °C)  Current: Temperature: 99 °F (37.2 °C)    Physical Exam:     General: Dated on ventilator, with some response to verbal stimuli, comfortable, nontoxic.   Neck:  Supple. No mass.  No lymphadenopathy.   Lungs: Expansion symmetric, no rhonchi, no rales, no wheezing.   Heart:  Regular rate and rhythm, S1 and S2 normal, no murmur.   Abdomen: Soft, nondistended, non-tender, bowel  sounds active all four quadrants, no masses, no organomegaly.   Extremities: Stable mild leg edema. No erythema/warmth. No any ulcer. Nontender to palpation.   Skin:  No rash.   Neuro: Not assessable.     Invasive Devices       Peripheral Intravenous Line  Duration             Peripheral IV 02/10/24 Right;Upper;Ventral (anterior) Arm 4 days    Peripheral IV 02/13/24 Dorsal (posterior);Left Hand <1 day    Peripheral IV 02/14/24 Left;Ventral (anterior) Forearm <1 day              Arterial Line  Duration             Arterial Line 02/14/24 Radial <1 day              Drain  Duration             NG/OG/Enteral Tube Orogastric Right mouth <1 day    Urethral Catheter Temperature probe 16 Fr. <1 day              Airway  Duration             ETT  Cuffed 7.5 mm <1 day                    Labs studies:   I have personally reviewed pertinent labs.  Results from last 7 days   Lab Units 02/14/24  0552 02/13/24  0552 02/12/24 0447   POTASSIUM mmol/L 5.2 5.0 4.0   CHLORIDE mmol/L 104 106 105   CO2 mmol/L 22 23 23   BUN mg/dL 33* 31* 31*   CREATININE mg/dL 0.99 0.95 0.91   EGFR ml/min/1.73sq m 63 66 69   CALCIUM mg/dL 9.6 9.1 9.6   AST U/L  --   --  22   ALT U/L  --   --  35   ALK PHOS U/L  --   --  72     Results from last 7 days   Lab Units 02/14/24  0552 02/13/24  0552 02/12/24 0447   WBC Thousand/uL 9.36 7.29 9.11   HEMOGLOBIN g/dL 10.0* 10.1* 10.5*   PLATELETS Thousands/uL 202 173 186     Results from last 7 days   Lab Units 02/10/24  1516 02/09/24  1203   SPUTUM CULTURE   --  4+ Growth of Stenotrophomonas maltophilia*  2+ Growth of   GRAM STAIN RESULT   --  No Epithelial cells per low power field*  No Polys*  4+ Gram negative rods*  Rare Gram positive cocci in clusters*   MRSA CULTURE ONLY  No Methicillin Resistant Staphlyococcus aureus (MRSA) isolated  --        Imaging Studies:   I have personally reviewed pertinent imaging study reports and images in PACS.    EKG, Pathology, and Other Studies:   I have personally  reviewed pertinent reports.

## 2024-02-14 NOTE — RESPIRATORY THERAPY NOTE
02/14/24 0800   Respiratory Assessment   Assessment Type Assess only   General Appearance Sedated   Respiratory Pattern Assisted   Chest Assessment Chest expansion symmetrical   Bilateral Breath Sounds Coarse;Diminished   Suction ET Tube   Resp Comments Pt resting quietly on P-SIMV settings, ett moved, sxn for sm white. well tolerated. pt remains on veletri. SpO2 92%. Aprox 3 hours of 6.4 mL/Hr remains in syringe.   O2 Device g5   Vent Information   Vent ID [id number]   Vent type Summers G5   Summers Vent Mode P-SIMV/PSIMV+   $ Pulse Oximetry Spot Check Charge Completed   P-SIMV/PSIMV+ Settings   Resp Rate (BPM) 14 BPM   Pressure Control/Pinsp (cmH20) 8 cmH20   Insp Time (S) 1.2 sec   FIO2 (%) 60 %   PEEP (cmH2O) 8 cmH2O   Pressure Support 15 cm H2O   Flow Trigger (LPM) -2 LPM   P-ramp (ms) 50 (ms)   ETS (%) 25 %   Humidification Heater   Heater Temp 95 °F (35 °C)   P-SIMV/PSIMV+ Actuals   Resp Rate (BPM) 14 BPM   VT (mL) 603 mL   MV (Obs) 8.7   MAP (cmH20) 11 cmH2O   Peak Pressure (cmH2O) 21 cmH2O   I:E Ratio (Obs) 1:3.0   Heater Temperature (Obs) 94.8 °F (34.9 °C)   Static Compliance (mL/cmH20) 27 mL/cmH2O   Plateau Pressure (cm H2O) 82 cm H2O   P-SIMV/PSIMV+ Alarms   High Peak Pressure (cmH20) 40 cm H2O   Low Pressure (cmH20) 5 cm H2O   High Resp Rate (BPM) 40 BPM   Low Resp Rate (BPM) 8 BPM   High MV (L/min) 18 L/min   Low MV (L/min) 4 L/min   High VT (mL) 1000 mL   Low VT (mL) 250 mL   P-SIMV/PSIMV+ Apnea Settings   Resp Rate (BPM) 15 BPM   Pressure Control Set (cmH2O) 15 cm H2O   Insp Time (S) 1.3 sec   Apnea Time (s) 20 S   Maintenance   Alarm (pink) cable attached No   Resuscitation bag with peep valve at bedside Yes   Water bag changed No   Circuit changed No   Daily Screen   Patient safety screen outcome: Failed   Not Ready for Weaning due to: Underline problem not resolved   IHI Ventilator Associated Pneumonia Bundle   Daily Assessment of Readiness to Extubate Yes   Head of Bed Elevated HOB 30   ETT   Cuffed 7.5 mm   Placement Date/Time: 02/13/24 (c) 4745   Type: Cuffed  Tube Size: 7.5 mm  Location: Oral  Insertion attempts: 1  Placement Verification: End tidal CO2;Symmetrical chest wall movement  Secured at (cm): 23   Secured at (cm) 23   Measured from Lips   Secured Location Center   Repositioned Right to Center   Secured by Commercial tube escudero   Site Condition Dry   Cuff Pressure (color) Green   HI-LO Suction  Intermittent suction   HI-LO Secretions Small;Blood tinged   HI-LO Intervention Patent

## 2024-02-14 NOTE — OCCUPATIONAL THERAPY NOTE
OT CANCEL NOTE    Pt chart reviewed. Pt is currently intubated/sedated and not appropriate for skilled OT treatment. Will continue to follow pt on caseload and see pt when medically stable and as clinically appropriate.       02/14/24 1311   OT Last Visit   OT Visit Date 02/14/24   Note Type   Note Type Cancelled Session   Cancel Reasons Intubated/sedated       Felisha Wilkerson MS, OTR/L

## 2024-02-14 NOTE — UTILIZATION REVIEW
Continued Stay Review    Date: 2/14/24                          Current Patient Class: inpatient  Current Level of Care: ICU  HPI:59 y.o. female initially admitted on 1/10/24     Assessment/Plan:   24hr events: Overnight, venlafaxine was held and fentanyl drip continued. An arterial line was placed on left side for hemodynamic monitoring. Patient did not need any levophed overnight. Was placed on SIMV ventilator setting to improve breathing.    Pt does wake up to verbal or physical stimuli. Able to move extremities spontaneously. Does withdraw to noxious stimuli in all extremities. Is able to nod her head when attempting to respond to questions. Lungs with diminished breath sounds.     Vital Signs:   Invasive Monitoring   Arterial Line  Amelia /53  Arterial Line BP  Min: 106/53  Max: 111/54   MAP 70 mmHg  Arterial Line MAP (mmHg)  Min: 70 mmHg  Max: 71 mmHg        Date/Time Temp Pulse Resp BP MAP (mmHg) Arterial Line BP MAP SpO2 FiO2 (%) Calculated FIO2 (%) - Nasal Cannula O2 Flow Rate (L/min) Nasal Cannula O2 Flow Rate (L/min) O2 Device O2 Interface Device Patient Position - Orthostatic VS   02/14/24 1218 -- -- -- -- -- -- -- 94 % -- -- -- -- -- -- --   02/14/24 1100 98.6 °F (37 °C) 85 14 -- -- 100/49 66 mmHg 94 % -- -- -- -- -- -- --   02/14/24 1000 98.6 °F (37 °C) 74 11 Abnormal  90/50 64 Abnormal  89/46 59 mmHg Abnormal  93 % -- -- -- -- -- -- --   02/14/24 0900 98.6 °F (37 °C) 75 11 Abnormal  92/51 67 90/46 60 mmHg Abnormal  93 % -- -- -- -- -- -- --   02/14/24 0800 98.4 °F (36.9 °C) 76 11 Abnormal  95/58 71 96/47 62 mmHg Abnormal  93 % -- -- -- -- -- -- --   02/14/24 0715 98.4 °F (36.9 °C) -- -- -- -- -- -- -- -- -- -- -- -- -- --   02/14/24 0700 98.4 °F (36.9 °C) 75 13 89/55 Abnormal  67 85/44 57 mmHg Abnormal  92 % -- -- -- -- -- -- --   02/14/24 0600 98.2 °F (36.8 °C) 81 12 109/59 77 106/52 69 mmHg 96 % -- -- -- -- -- -- --   02/14/24 0500 98.2 °F (36.8 °C) 73 13 92/52 68 96/47 63 mmHg Abnormal  95 %  -- -- -- -- -- -- --   02/14/24 0400 98.4 °F (36.9 °C) 77 13 93/52 67 92/47 62 mmHg Abnormal  95 % -- -- -- -- -- -- --   02/14/24 0300 98.2 °F (36.8 °C) 73 14 95/53 68 -- -- 95 % -- -- -- -- -- -- --   02/14/24 0200 98.1 °F (36.7 °C) 74 14 97/54 70 106/53 70 mmHg 95 % -- -- -- -- -- -- --   02/14/24 0100 97.9 °F (36.6 °C) 73 12 91/54 68 111/54 71 mmHg 94 % -- -- -- -- -- -- --   02/14/24 0000 97.9 °F (36.6 °C) 72 13 90/54 67 -- -- 94 % -- -- -- -- -- -- --     Pertinent Labs/Diagnostic Results:   2/13 XR chest portable ICU    INDICATION: post intubation  IMPRESSION:  Endotracheal tube projects 4.2 cm above the tracee with nasogastric tube below the diaphragm. No significant change in right greater than left airspace disease.    Results from last 7 days   Lab Units 02/14/24  0552 02/13/24  0552 02/12/24  0447 02/11/24  0450 02/10/24  0554 02/09/24  0832   WBC Thousand/uL 9.36 7.29 9.11 8.24 8.71 6.63   HEMOGLOBIN g/dL 10.0* 10.1* 10.5* 10.8* 11.3* 11.4*   HEMATOCRIT % 30.2* 31.3* 32.5* 34.4* 35.0 36.0   PLATELETS Thousands/uL 202 173 186 165 137* 157   BANDS PCT %  --  3  --   --   --  8     Results from last 7 days   Lab Units 02/14/24  0552 02/13/24  0552 02/12/24  0447 02/11/24  0450 02/10/24  0554   SODIUM mmol/L 133* 135 137 139 141   POTASSIUM mmol/L 5.2 5.0 4.0 4.2 4.1   CHLORIDE mmol/L 104 106 105 105 105   CO2 mmol/L 22 23 23 26 26   ANION GAP mmol/L 7 6 9 8 10   BUN mg/dL 33* 31* 31* 31* 27*   CREATININE mg/dL 0.99 0.95 0.91 0.88 0.91   EGFR ml/min/1.73sq m 63 66 69 72 69   CALCIUM mg/dL 9.6 9.1 9.6 9.5 9.5   MAGNESIUM mg/dL 2.2 2.1 2.0 2.1  --    PHOSPHORUS mg/dL  --   --  3.3 3.1  --      Results from last 7 days   Lab Units 02/12/24  0447   AST U/L 22   ALT U/L 35   ALK PHOS U/L 72   TOTAL PROTEIN g/dL 5.9*   ALBUMIN g/dL 2.9*   TOTAL BILIRUBIN mg/dL 0.29     Results from last 7 days   Lab Units 02/14/24  0026 02/13/24  1707 02/13/24  1148 02/13/24  0805 02/12/24  2228 02/12/24  1722 02/12/24  1126  02/12/24  0758 02/11/24  2150 02/11/24  1632 02/11/24  1145 02/11/24  0820   POC GLUCOSE mg/dl 110 125 131 155* 103 205* 115 85 270* 191* 164* 151*     Results from last 7 days   Lab Units 02/14/24  0552 02/13/24  0552 02/12/24  0447 02/11/24  0450 02/10/24  0554 02/09/24  0628 02/08/24  0504   GLUCOSE RANDOM mg/dL 119 160* 95 135 176* 100 122     Results from last 7 days   Lab Units 02/14/24  0556 02/13/24  2050   PH ART  7.382 7.358   PCO2 ART mm Hg 38.5 36.7   PO2 ART mm Hg 107.8 104.0   HCO3 ART mmol/L 22.4 20.2*   BASE EXC ART mmol/L -2.4 -4.7   O2 CONTENT ART mL/dL 15.0* 15.1*   O2 HGB, ARTERIAL % 96.9 96.3   ABG SOURCE  Line, Arterial Radial, Left     Results from last 7 days   Lab Units 02/08/24  0504   PROCALCITONIN ng/ml 0.06     Results from last 7 days   Lab Units 02/11/24  0450 02/09/24  1717   BNP pg/mL 36 75     Results from last 7 days   Lab Units 02/14/24  0552 02/12/24  0447 02/11/24  0450 02/08/24  0504   CRP mg/L 15.8* 21.0* 31.6* 1.0     Results from last 7 days   Lab Units 02/09/24  1203   SPUTUM CULTURE  4+ Growth of Stenotrophomonas maltophilia*  2+ Growth of   GRAM STAIN RESULT  No Epithelial cells per low power field*  No Polys*  4+ Gram negative rods*  Rare Gram positive cocci in clusters*       Medications:   Scheduled Medications:  carbamide peroxide, 5 drop, Both Ears, BID  chlorhexidine, 15 mL, Mouth/Throat, Q12H SARTHAK  enoxaparin, 40 mg, Subcutaneous, Daily  immune globulin, human, 400 mg/kg (Adjusted), Intravenous, Daily  insulin lispro, 1-5 Units, Subcutaneous, 4x Daily (AC & HS)  insulin lispro, 2 Units, Subcutaneous, TID With Meals  lamoTRIgine, 150 mg, Oral, BID  melatonin, 6 mg, Oral, HS  methylPREDNISolone sodium succinate, 125 mg, Intravenous, Q12H SARTHAK  nystatin, , Topical, BID  pantoprazole, 40 mg, Intravenous, Q24H SARTHAK  polyethylene glycol, 17 g, Oral, Daily  QUEtiapine, 12.5 mg, Oral, HS  sodium chloride, 1 Application, Nasal, Q2H  sodium chloride, 1 spray, Each Nare,  Q2H  sulfamethoxazole-trimethoprim, 5 mg/kg of trimethoprim, Oral, TID  topiramate, 100 mg, Oral, BID  tranexamic acid, 500 mg, Nasal, Once    Continuous IV Infusions:  epoprostenol, 6.25-50 ng/kg/min (Ideal), Inhalation, Titrated  fentaNYL, 50 mcg/hr, Intravenous, Continuous  norepinephrine, 1-30 mcg/min, Intravenous, Titrated  propofol, 5-100 mcg/kg/min, Intravenous, Titrated    PRN Meds:  acetaminophen, 650 mg, Oral, Q6H PRN  bisacodyl, 10 mg, Rectal, Daily PRN  calcium carbonate, 500 mg, Oral, BID PRN  metoprolol, 2.5 mg, Intravenous, Q6H PRN  ondansetron, 4 mg, Intravenous, Q6H PRN  oxymetazoline, 6 spray, Each Nare, BID PRN  senna-docusate sodium, 2 tablet, Oral, BID PRN    Discharge Plan: d    Network Utilization Review Department  ATTENTION: Please call with any questions or concerns to [phone] and carefully listen to the prompts so that you are directed to the right person. All voicemails are confidential.   For Discharge needs, contact Care Management DC Support Team at [phone] opt. [phone]  Send all requests for admission clinical reviews, approved or denied determinations and any other requests to dedicated fax number below belonging to the campus where the patient is receiving treatment. List of dedicated fax numbers for the Facilities:  FACILITY NAME UR FAX NUMBER   ADMISSION DENIALS (Administrative/Medical Necessity) [phone]   DISCHARGE SUPPORT TEAM (NETWORK) [phone]   PARENT CHILD HEALTH (Maternity/NICU/Pediatrics) [phone]   [facility] [phone]   [facility] [phone]   [facility] [phone]   [facility] [phone]   [facility] [phone]   [facility] [phone]   [facility] [phone]   [facility] [phone]   [facility]  HEART [place] [phone]   [facility] [phone]   [facility] [phone]   [facility] [phone]

## 2024-02-14 NOTE — ASSESSMENT & PLAN NOTE
Time spent with spouse providing supportive listening. Will ask SW to follow with family for further support.   All questions and concerns were addressed to their satisfaction.    Decisional apparatus:  Patient is not competent on my exam today.  If competence is lost, patient's substitute decision maker would default to spouse by [place] 169.  Advance Directive / Living Will / POLST:  no'      PSC will follow closely

## 2024-02-14 NOTE — PROGRESS NOTES
Responded to family request for visit by pastoral care. Attempted to visit. Family at bedside. Introduced self and provided prayer. Family thanked for stop by her room.   02/14/24 1400   Clinical Encounter Type   Visited With Patient and family together   Routine Visit Introduction   Crisis Visit Critical Care   Referral From Nurse   Referral To   (Fr Daugherty)   Catholic Encounters   Catholic Needs Prayer

## 2024-02-14 NOTE — CONSULTS
[facility]  Consult  Name: Carla Hunt 59 y.o. female I MRN: [id number]  Unit/Bed#: MICU 10 I Date of Admission: 1/10/2024   Date of Service: 2/14/2024 I Hospital Day: 35    Inpatient consult to Palliative Care  Consult performed by: Bhakti Kaur DO  Consult ordered by: Jad Mena MD          Assessment/Plan   Goals of care, counseling/discussion  Assessment & Plan  Introduced role of palliative medicine as additional support for patient's family during critical illness.  Provided spouse with contact information.  We discussed her current clinical situation and addressed all of his questions and concerns.  He does understand that her prognosis is very guarded at this point.  However, he is hopeful for recovery.  He does have a clear limit that the patient is already placed which is no tracheostomy or long-term ventilator support.  Plan for ongoing conversation as clinical situation evolves.    Palliative care patient  Assessment & Plan  Time spent with spouse providing supportive listening. Will ask SW to follow with family for further support.   All questions and concerns were addressed to their satisfaction.    Decisional apparatus:  Patient is not competent on my exam today.  If competence is lost, patient's substitute decision maker would default to spouse by [place] 169.  Advance Directive / Living Will / POLST:  no'      PSC will follow closely        Teto marginal zone B-cell lymphoma (HCC)  Assessment & Plan  On maintenance therapy    Anxiety state  Assessment & Plan  Continue Effexor    * Acute respiratory failure with hypoxia (HCC)  Assessment & Plan  Currently intubated (ETT day 1)                We appreciate the invitation to be involved in this patient's care.  We will follow.  Please do not hesitate to reach our on call provider through our clinic answering service at [phone] should you have acute symptom control concerns.    Bhakti  Tania Kaur,   Palliative and Supportive Care  Clinic/Answering Service: [phone]  You can find me on [employer]!       IDENTIFICATION:  Reason for Consult / Principal Problem: goals of care  Hx and PE limited by: intubated/sedated    HISTORY OF PRESENT ILLNESS:       Carla Hunt is a 59 y.o. female who presented to [facility] on January 7.  She has a history of epilepsy status post temporal lobe resection, B-cell lymphoma on maintenance therapy, recent COVID infection, and other medical history as detailed below.  Per her spouse she started acting ill late December and had received antibiotic treatment for a concern for UTI and ultimately a second visit further antibiotics for gastritis.  On the morning of the 7 she woke up altered and the ambulance was called.  Decision was made to transfer [facility] for further workup.  Patient has had prolonged hospitalization.  Ultimately her encephalopathy improved by end of January.  However, her respiratory status continued to worsen.  Imaging revealed fibrotic lungs and over the last couple days she has had increasing oxygen requirements.  She is requiring 100% FiO2 on high flow nasal cannula and ultimately weight last night she required intubation and mechanical ventilation.    Patient spouse is at bedside.  Tells me they have been  for almost 33 years.  They have 3 children, unfortunately 1 passed away 5 years ago.  Their daughter is looking forward to getting  in July.  In the third child as a teenager.  He does state that he is very overwhelmed and scared of the current medical situation.  He does tell me that they have spoken about life-sustaining treatments in the past and they are okay for intubation at this point but she would not want to proceed with tracheostomy and feeding tube placement or long-term care at a facility.    Review of Systems   Unable to perform ROS: Intubated       Past Medical History:   Diagnosis Date    Hx of  hypercalcemia     Lymphoma (HCC) 2021    Parathyroid disease (HCC)     Seizures (HCC)     Situational depression     24 yr old son  from drug overdose     Past Surgical History:   Procedure Laterality Date    CRANIOTOMY FOR TEMPORAL LOBECTOMY Left      Social History     Socioeconomic History    Marital status: /Civil Union     Spouse name: Not on file    Number of children: Not on file    Years of education: Not on file    Highest education level: Not on file   Occupational History    Not on file   Tobacco Use    Smoking status: Never    Smokeless tobacco: Never   Vaping Use    Vaping status: Never Used   Substance and Sexual Activity    Alcohol use: No    Drug use: Never    Sexual activity: Not on file   Other Topics Concern    Not on file   Social History Narrative    Not on file     Social Determinants of Health     Financial Resource Strain: Not on file   Food Insecurity: No Food Insecurity (2024)    Hunger Vital Sign     Worried About Running Out of Food in the Last Year: Never true     Ran Out of Food in the Last Year: Never true   Transportation Needs: No Transportation Needs (2024)    PRAPARE - Transportation     Lack of Transportation (Medical): No     Lack of Transportation (Non-Medical): No   Physical Activity: Not on file   Stress: Not on file   Social Connections: Not on file   Intimate Partner Violence: Not on file   Housing Stability: Low Risk  (2024)    Housing Stability Vital Sign     Unable to Pay for Housing in the Last Year: No     Number of Places Lived in the Last Year: 1     Unstable Housing in the Last Year: No     Family History   Problem Relation Age of Onset    Diabetes Mother     Cancer Father        MEDICATIONS / ALLERGIES:    all current active meds have been reviewed and current meds:   Current Facility-Administered Medications   Medication Dose Route Frequency    acetaminophen (TYLENOL) tablet 650 mg  650 mg Oral Q6H PRN    bisacodyl (DULCOLAX) rectal  suppository 10 mg  10 mg Rectal Daily PRN    calcium carbonate (TUMS) chewable tablet 500 mg  500 mg Oral BID PRN    chlorhexidine (PERIDEX) 0.12 % oral rinse 15 mL  15 mL Mouth/Throat Q12H [facility]    enoxaparin (LOVENOX) subcutaneous injection 40 mg  40 mg Subcutaneous Daily    epoprostenol (VELETRI) 30,000 ng/mL in sodium chloride 0.9 % 50 mL inhalation solution  6.25-50 ng/kg/min (Ideal) Inhalation Titrated    famotidine (PEPCID) oral suspension 20 mg  20 mg Oral Daily    fentaNYL 1000 mcg in sodium chloride 0.9% 100mL infusion  50 mcg/hr Intravenous Continuous    HYDROmorphone (DILAUDID) injection 0.5 mg  0.5 mg Intravenous Q1H PRN    immune globulin, human (GAMUNEX-C) 27.5 g 275 mL IV soln  400 mg/kg (Adjusted) Intravenous Daily    insulin lispro (HumaLOG) 100 units/mL subcutaneous injection 1-5 Units  1-5 Units Subcutaneous Q6H [facility]    lamoTRIgine (LaMICtal) tablet 150 mg  150 mg Oral BID    melatonin tablet 6 mg  6 mg Oral HS    methylPREDNISolone sodium succinate (Solu-MEDROL) injection 125 mg  125 mg Intravenous Q12H [facility]    [START ON 2/15/2024] methylPREDNISolone sodium succinate (Solu-MEDROL) injection 60 mg  60 mg Intravenous Q12H [facility]    metoprolol (LOPRESSOR) injection 2.5 mg  2.5 mg Intravenous Q6H PRN    norepinephrine (LEVOPHED) 4 mg (STANDARD CONCENTRATION) IV in sodium chloride 0.9% 250 mL  1-30 mcg/min Intravenous Titrated    nystatin (MYCOSTATIN) powder   Topical BID    ondansetron (ZOFRAN) injection 4 mg  4 mg Intravenous Q6H PRN    oxymetazoline (AFRIN) 0.05 % nasal spray 6 spray  6 spray Each Nare BID PRN    pantoprazole (PROTONIX) injection 40 mg  40 mg Intravenous Q24H [facility]    polyethylene glycol (MIRALAX) packet 17 g  17 g Oral Daily    propofol (DIPRIVAN) 1000 mg in 100 mL infusion (premix)  5-100 mcg/kg/min Intravenous Titrated    QUEtiapine (SEROquel) tablet 12.5 mg  12.5 mg Oral HS    senna-docusate sodium (SENOKOT S) 8.6-50 mg per tablet 2 tablet  2 tablet Oral BID PRN    sodium chloride (AYR  "SALINE NASAL) nasal gel 1 Application  1 Application Nasal Q2H    sodium chloride (OCEAN) 0.65 % nasal spray 1 spray  1 spray Each Nare Q2H    sulfamethoxazole-trimethoprim (BACTRIM DS) 800-160 mg per tablet 2.5 tablet  5 mg/kg of trimethoprim Oral TID    topiramate (TOPAMAX) tablet 100 mg  100 mg Oral BID    venlafaxine (EFFEXOR) tablet 25 mg  25 mg Per NG Tube TID With Meals       No Known Allergies    OBJECTIVE:    Physical Exam  Physical Exam  Vitals and nursing note reviewed.   Constitutional:       General: She is not in acute distress.     Appearance: She is obese. She is ill-appearing.   HENT:      Head: Normocephalic and atraumatic.      Right Ear: External ear normal.      Left Ear: External ear normal.   Eyes:      General:         Right eye: No discharge.         Left eye: No discharge.   Cardiovascular:      Rate and Rhythm: Tachycardia present.   Pulmonary:      Comments: intubated  Abdominal:      General: There is no distension.      Palpations: Abdomen is soft.   Skin:     Coloration: Skin is pale.   Neurological:      Comments: sedated         Lab Results: I have personally reviewed pertinent labs., CBC:   Lab Results   Component Value Date    WBC 9.36 02/14/2024    HGB 10.0 (L) 02/14/2024    HCT 30.2 (L) 02/14/2024    MCV 91 02/14/2024     02/14/2024    RBC 3.32 (L) 02/14/2024    MCH 30.1 02/14/2024    MCHC 33.1 02/14/2024    RDW 17.8 (H) 02/14/2024    MPV 11.1 02/14/2024   , CMP:   Lab Results   Component Value Date    SODIUM 133 (L) 02/14/2024    K 5.2 02/14/2024     02/14/2024    CO2 22 02/14/2024    BUN 33 (H) 02/14/2024    CREATININE 0.99 02/14/2024    CALCIUM 9.6 02/14/2024    EGFR 63 02/14/2024   , PT/PTT:No results found for: \"PT\", \"PTT\"  Imaging Studies: Reviewed imaging on PACS  EKG, Pathology, and Other Studies: Reviewed    Counseling / Coordination of Care    Total floor / unit time spent today 75+ minutes. Greater than 50% of total time was spent with the patient and / or " "family counseling and / or coordination of care. A description of the counseling / coordination of care: Chart review, medication review, discussion with critical care team, review of palliative care services, goals of care, supportive listening.    Portions of this document may have been created using dictation software and as such some \"sound alike\" terms may have been generated by the system. Do not hesitate to contact me with any questions or clarifications.      "

## 2024-02-14 NOTE — PLAN OF CARE
Problem: Prexisting or High Potential for Compromised Skin Integrity  Goal: Skin integrity is maintained or improved  Description: INTERVENTIONS:  - Identify patients at risk for skin breakdown  - Assess and monitor skin integrity  - Assess and monitor nutrition and hydration status  - Monitor labs   - Assess for incontinence   - Turn and reposition patient  - Assist with mobility/ambulation  - Relieve pressure over bony prominences  - Avoid friction and shearing  - Provide appropriate hygiene as needed including keeping skin clean and dry  - Evaluate need for skin moisturizer/barrier cream  - Collaborate with interdisciplinary team   - Patient/family teaching  - Consider wound care consult   Outcome: Progressing     Problem: Nutrition/Hydration-ADULT  Goal: Nutrient/Hydration intake appropriate for improving, restoring or maintaining nutritional needs  Description: Monitor and assess patient's nutrition/hydration status for malnutrition. Collaborate with interdisciplinary team and initiate plan and interventions as ordered.  Monitor patient's weight and dietary intake as ordered or per policy. Utilize nutrition screening tool and intervene as necessary. Determine patient's food preferences and provide high-protein, high-caloric foods as appropriate.     INTERVENTIONS:  - Monitor oral intake, urinary output, labs, and treatment plans  - Assess nutrition and hydration status and recommend course of action  - Evaluate amount of meals eaten  - Assist patient with eating if necessary   - Allow adequate time for meals  - Recommend/ encourage appropriate diets, oral nutritional supplements, and vitamin/mineral supplements  - Order, calculate, and assess calorie counts as needed  - Recommend, monitor, and adjust tube feedings and TPN/PPN based on assessed needs  - Assess need for intravenous fluids  - Provide specific nutrition/hydration education as appropriate  - Include patient/family/caregiver in decisions related to  nutrition  Outcome: Progressing     Problem: INFECTION - ADULT  Goal: Absence or prevention of progression during hospitalization  Description: INTERVENTIONS:  - Assess and monitor for signs and symptoms of infection  - Monitor lab/diagnostic results  - Monitor all insertion sites, i.e. indwelling lines, tubes, and drains  - Monitor endotracheal if appropriate and nasal secretions for changes in amount and color  - Dennard appropriate cooling/warming therapies per order  - Administer medications as ordered  - Instruct and encourage patient and family to use good hand hygiene technique  - Identify and instruct in appropriate isolation precautions for identified infection/condition  Outcome: Progressing     Problem: SAFETY ADULT  Goal: Patient will remain free of falls  Description: INTERVENTIONS:  - Educate patient/family on patient safety including physical limitations  - Instruct patient to call for assistance with activity   - Consult OT/PT to assist with strengthening/mobility   - Keep Call bell within reach  - Keep bed low and locked with side rails adjusted as appropriate  - Keep care items and personal belongings within reach  - Initiate and maintain comfort rounds  - Make Fall Risk Sign visible to staff  - Offer Toileting every 2 Hours, in advance of need  - Initiate/Maintain bed alarm  - Obtain necessary fall risk management equipment: non skid footwear  - Apply yellow socks and bracelet for high fall risk patients  - Consider moving patient to room near nurses station  Outcome: Progressing     Problem: RESPIRATORY - ADULT  Goal: Achieves optimal ventilation and oxygenation  Description: INTERVENTIONS:  - Assess for changes in respiratory status  - Assess for changes in mentation and behavior  - Position to facilitate oxygenation and minimize respiratory effort  - Oxygen administered by appropriate delivery if ordered  - Initiate smoking cessation education as indicated  - Encourage broncho-pulmonary  hygiene including cough, deep breathe, Incentive Spirometry  - Assess the need for suctioning and aspirate as needed  - Assess and instruct to report SOB or any respiratory difficulty  - Respiratory Therapy support as indicated  Outcome: Progressing     Problem: SKIN/TISSUE INTEGRITY - ADULT  Goal: Skin Integrity remains intact(Skin Breakdown Prevention)  Description: Assess:  -Perform Flavio assessment every shift   -Clean and moisturize skin every day   -Inspect skin when repositioning, toileting, and assisting with ADLS  -Assess under medical devices such as ronny  every hour   -Assess extremities for adequate circulation and sensation     Bed Management:  -Have minimal linens on bed & keep smooth, unwrinkled  -Change linens as needed when moist or perspiring  -Avoid sitting or lying in one position for more than 2 hours while in bed  -Keep HOB at 45 degrees     Toileting:  -Offer bedside commode  -Assess for incontinence every hour  -Use incontinent care products after each incontinent episode such as moisture barrier cream     Activity:  -Mobilize patient 3 times a day  -Encourage activity and walks on unit  -Encourage or provide ROM exercises   -Turn and reposition patient every 2 Hours  -Use appropriate equipment to lift or move patient in bed  -Instruct/ Assist with weight shifting every hour when out of bed in chair  -Consider limitation of chair time 2 hour intervals    Skin Care:  -Avoid use of baby powder, tape, friction and shearing, hot water or constrictive clothing  -Relieve pressure over bony prominences using allevyn   -Do not massage red bony areas    Next Steps:  -Teach patient strategies to minimize risks such as weight shifting    -Consider consults to  interdisciplinary teams such as PT  Outcome: Progressing     Problem: Potential for Falls  Goal: Patient will remain free of falls  Description: INTERVENTIONS:  - Educate patient/family on patient safety including physical limitations  -  Instruct patient to call for assistance with activity   - Consult OT/PT to assist with strengthening/mobility   - Keep Call bell within reach  - Keep bed low and locked with side rails adjusted as appropriate  - Keep care items and personal belongings within reach  - Initiate and maintain comfort rounds  - Make Fall Risk Sign visible to staff  - Offer Toileting every 2 Hours, in advance of need  - Initiate/Maintain bed alarm  - Obtain necessary fall risk management equipment: non skid footwear  - Apply yellow socks and bracelet for high fall risk patients  - Consider moving patient to room near nurses station  Outcome: Progressing

## 2024-02-14 NOTE — PLAN OF CARE

## 2024-02-14 NOTE — PROCEDURES
Arterial Line Insertion    Date/Time: 2/14/2024 12:03 AM    Performed by: Nigel Escobar DO  Authorized by: Nigel Escobar DO    Patient location:  Bedside  Other Assisting Provider: No    Consent:     Consent obtained:  Verbal (Min Hunt ())    Consent given by:  Spouse    Risks discussed:  Bleeding, pain, ischemia, infection and repeat procedure  Universal protocol:     Procedure explained and questions answered to patient or proxy's satisfaction: yes      Site/side marked: yes      Immediately prior to procedure a time out was called: yes      Patient identity confirmed:  Arm band, provided demographic data and hospital-assigned identification number  Indications:     Indications: hemodynamic monitoring, multiple ABGs, continuous blood pressure monitoring and frequent labs / infusion    Pre-procedure details:     Skin preparation:  Chlorhexidine    Preparation: Patient was prepped and draped in sterile fashion    Sedation:     Sedation type:  Moderate (conscious) sedation  Anesthesia (see MAR for exact dosages):     Anesthesia method:  Local infiltration    Local anesthetic:  Lidocaine 1% w/o epi  Procedure details:     Location / Tip of Catheter:  Radial    Laterality:  Left    Placement technique:  Percutaneous and ultrasound guided    Sterile ultrasound techniques: Sterile gel and sterile probe covers were used      Number of attempts:  1    Successful placement: yes      Transducer: waveform confirmed    Post-procedure details:     Post-procedure:  Sutured and sterile dressing applied    Patient tolerance of procedure:  Tolerated well, no immediate complications

## 2024-02-14 NOTE — ASSESSMENT & PLAN NOTE
Introduced role of palliative medicine as additional support for patient's family during critical illness.  Provided spouse with contact information.  We discussed her current clinical situation and addressed all of his questions and concerns.  He does understand that her prognosis is very guarded at this point.  However, he is hopeful for recovery.  He does have a clear limit that the patient is already placed which is no tracheostomy or long-term ventilator support.  Plan for ongoing conversation as clinical situation evolves.

## 2024-02-14 NOTE — PHYSICAL THERAPY NOTE
Physical Therapy Cancellation Note         02/14/24 1300   PT Last Visit   PT Visit Date 02/14/24   Note Type   Note type Re-Evaluation   Cancel Reasons Intubated/sedated     CHART REVIEW COMPLETED. PT NOT APPROPRIATE FOR PARTICIPATION IN PT AT THIS TIME 2* MEDICAL STATUS. WILL CONTINUE TO FOLLOW AND PROGRESS AS MEDICAL STATUS DICTATES.   Moon Ventura, PT

## 2024-02-15 LAB
ANION GAP SERPL CALCULATED.3IONS-SCNC: 4 MMOL/L
BASE EXCESS BLDA CALC-SCNC: -3.1 MMOL/L
BUN SERPL-MCNC: 38 MG/DL (ref 5–25)
CALCIUM SERPL-MCNC: 9.5 MG/DL (ref 8.4–10.2)
CHLORIDE SERPL-SCNC: 104 MMOL/L (ref 96–108)
CO2 SERPL-SCNC: 21 MMOL/L (ref 21–32)
CREAT SERPL-MCNC: 1.15 MG/DL (ref 0.6–1.3)
CRP SERPL QL: 8.3 MG/L
ERYTHROCYTE [DISTWIDTH] IN BLOOD BY AUTOMATED COUNT: 17.2 % (ref 11.6–15.1)
GFR SERPL CREATININE-BSD FRML MDRD: 52 ML/MIN/1.73SQ M
GLUCOSE SERPL-MCNC: 112 MG/DL (ref 65–140)
GLUCOSE SERPL-MCNC: 113 MG/DL (ref 65–140)
GLUCOSE SERPL-MCNC: 118 MG/DL (ref 65–140)
GLUCOSE SERPL-MCNC: 134 MG/DL (ref 65–140)
GLUCOSE SERPL-MCNC: 91 MG/DL (ref 65–140)
GLUCOSE SERPL-MCNC: 96 MG/DL (ref 65–140)
HCO3 BLDA-SCNC: 21.1 MMOL/L (ref 22–28)
HCT VFR BLD AUTO: 29.4 % (ref 34.8–46.1)
HGB BLD-MCNC: 9.5 G/DL (ref 11.5–15.4)
HOROWITZ INDEX BLDA+IHG-RTO: 60 MM[HG]
MAGNESIUM SERPL-MCNC: 2.4 MG/DL (ref 1.9–2.7)
MCH RBC QN AUTO: 30 PG (ref 26.8–34.3)
MCHC RBC AUTO-ENTMCNC: 32.3 G/DL (ref 31.4–37.4)
MCV RBC AUTO: 93 FL (ref 82–98)
NRBC BLD AUTO-RTO: 0 /100 WBCS
O2 CT BLDA-SCNC: 15.6 ML/DL (ref 16–23)
OSMOLALITY UR/SERPL-RTO: 301 MMOL/KG (ref 282–298)
OSMOLALITY UR/SERPL-RTO: 302 MMOL/KG (ref 282–298)
OSMOLALITY UR/SERPL-RTO: 304 MMOL/KG (ref 282–298)
OSMOLALITY UR: 475 MMOL/KG
OXYHGB MFR BLDA: 95.9 % (ref 94–97)
PCO2 BLDA: 34.7 MM HG (ref 36–44)
PEEP RESPIRATORY: 8 CM[H2O]
PH BLDA: 7.4 [PH] (ref 7.35–7.45)
PLATELET # BLD AUTO: 210 THOUSANDS/UL (ref 149–390)
PMV BLD AUTO: 10.7 FL (ref 8.9–12.7)
PO2 BLDA: 95.5 MM HG (ref 75–129)
POTASSIUM SERPL-SCNC: 5.1 MMOL/L (ref 3.5–5.3)
RBC # BLD AUTO: 3.17 MILLION/UL (ref 3.81–5.12)
SODIUM 24H UR-SCNC: 57 MOL/L
SODIUM SERPL-SCNC: 129 MMOL/L (ref 135–147)
SPECIMEN SOURCE: ABNORMAL
VENT AC: 14
VENT- AC: AC
VT SETTING VENT: 40 ML
WBC # BLD AUTO: 7.77 THOUSAND/UL (ref 4.31–10.16)

## 2024-02-15 PROCEDURE — 94664 DEMO&/EVAL PT USE INHALER: CPT

## 2024-02-15 PROCEDURE — 82948 REAGENT STRIP/BLOOD GLUCOSE: CPT

## 2024-02-15 PROCEDURE — 94760 N-INVAS EAR/PLS OXIMETRY 1: CPT

## 2024-02-15 PROCEDURE — 83735 ASSAY OF MAGNESIUM: CPT | Performed by: STUDENT IN AN ORGANIZED HEALTH CARE EDUCATION/TRAINING PROGRAM

## 2024-02-15 PROCEDURE — 86140 C-REACTIVE PROTEIN: CPT

## 2024-02-15 PROCEDURE — 99233 SBSQ HOSP IP/OBS HIGH 50: CPT | Performed by: INTERNAL MEDICINE

## 2024-02-15 PROCEDURE — 80048 BASIC METABOLIC PNL TOTAL CA: CPT | Performed by: STUDENT IN AN ORGANIZED HEALTH CARE EDUCATION/TRAINING PROGRAM

## 2024-02-15 PROCEDURE — 82805 BLOOD GASES W/O2 SATURATION: CPT | Performed by: STUDENT IN AN ORGANIZED HEALTH CARE EDUCATION/TRAINING PROGRAM

## 2024-02-15 PROCEDURE — 83930 ASSAY OF BLOOD OSMOLALITY: CPT | Performed by: STUDENT IN AN ORGANIZED HEALTH CARE EDUCATION/TRAINING PROGRAM

## 2024-02-15 PROCEDURE — 83935 ASSAY OF URINE OSMOLALITY: CPT | Performed by: STUDENT IN AN ORGANIZED HEALTH CARE EDUCATION/TRAINING PROGRAM

## 2024-02-15 PROCEDURE — 99232 SBSQ HOSP IP/OBS MODERATE 35: CPT | Performed by: INTERNAL MEDICINE

## 2024-02-15 PROCEDURE — C9113 INJ PANTOPRAZOLE SODIUM, VIA: HCPCS

## 2024-02-15 PROCEDURE — 99291 CRITICAL CARE FIRST HOUR: CPT | Performed by: INTERNAL MEDICINE

## 2024-02-15 PROCEDURE — 84300 ASSAY OF URINE SODIUM: CPT | Performed by: STUDENT IN AN ORGANIZED HEALTH CARE EDUCATION/TRAINING PROGRAM

## 2024-02-15 PROCEDURE — 85027 COMPLETE CBC AUTOMATED: CPT | Performed by: STUDENT IN AN ORGANIZED HEALTH CARE EDUCATION/TRAINING PROGRAM

## 2024-02-15 RX ORDER — BISACODYL 10 MG
10 SUPPOSITORY, RECTAL RECTAL DAILY PRN
Status: DISCONTINUED | OUTPATIENT
Start: 2024-02-15 | End: 2024-02-20

## 2024-02-15 RX ADMIN — VENLAFAXINE 25 MG: 25 TABLET ORAL at 07:45

## 2024-02-15 RX ADMIN — SULFAMETHOXAZOLE AND TRIMETHOPRIM 2.5 TABLET: 800; 160 TABLET ORAL at 16:10

## 2024-02-15 RX ADMIN — CHLORHEXIDINE GLUCONATE 0.12% ORAL RINSE 15 ML: 1.2 LIQUID ORAL at 21:24

## 2024-02-15 RX ADMIN — HYDROMORPHONE HYDROCHLORIDE 0.5 MG: 1 INJECTION, SOLUTION INTRAMUSCULAR; INTRAVENOUS; SUBCUTANEOUS at 18:49

## 2024-02-15 RX ADMIN — Medication 1 SPRAY: at 12:00

## 2024-02-15 RX ADMIN — ENOXAPARIN SODIUM 40 MG: 40 INJECTION SUBCUTANEOUS at 09:29

## 2024-02-15 RX ADMIN — Medication 1 APPLICATION: at 12:00

## 2024-02-15 RX ADMIN — Medication 1 SPRAY: at 07:46

## 2024-02-15 RX ADMIN — Medication 1 APPLICATION: at 09:30

## 2024-02-15 RX ADMIN — POLYETHYLENE GLYCOL 3350 17 G: 17 POWDER, FOR SOLUTION ORAL at 09:30

## 2024-02-15 RX ADMIN — Medication 1 SPRAY: at 00:44

## 2024-02-15 RX ADMIN — Medication 1 SPRAY: at 16:09

## 2024-02-15 RX ADMIN — Medication 1 APPLICATION: at 23:30

## 2024-02-15 RX ADMIN — Medication 1 SPRAY: at 17:27

## 2024-02-15 RX ADMIN — VENLAFAXINE 25 MG: 25 TABLET ORAL at 16:10

## 2024-02-15 RX ADMIN — METHYLPREDNISOLONE SODIUM SUCCINATE 60 MG: 125 INJECTION, POWDER, FOR SOLUTION INTRAMUSCULAR; INTRAVENOUS at 09:29

## 2024-02-15 RX ADMIN — Medication 27.5 G: at 14:00

## 2024-02-15 RX ADMIN — LAMOTRIGINE 150 MG: 100 TABLET ORAL at 17:27

## 2024-02-15 RX ADMIN — LAMOTRIGINE 150 MG: 100 TABLET ORAL at 09:30

## 2024-02-15 RX ADMIN — Medication 1 APPLICATION: at 16:09

## 2024-02-15 RX ADMIN — TOPIRAMATE 100 MG: 100 TABLET, FILM COATED ORAL at 17:28

## 2024-02-15 RX ADMIN — SULFAMETHOXAZOLE AND TRIMETHOPRIM 2.5 TABLET: 800; 160 TABLET ORAL at 21:24

## 2024-02-15 RX ADMIN — Medication 1 APPLICATION: at 02:56

## 2024-02-15 RX ADMIN — Medication 1 APPLICATION: at 05:48

## 2024-02-15 RX ADMIN — Medication 1 SPRAY: at 19:31

## 2024-02-15 RX ADMIN — CHLORHEXIDINE GLUCONATE 0.12% ORAL RINSE 15 ML: 1.2 LIQUID ORAL at 09:28

## 2024-02-15 RX ADMIN — PANTOPRAZOLE SODIUM 40 MG: 40 INJECTION, POWDER, FOR SOLUTION INTRAVENOUS at 09:30

## 2024-02-15 RX ADMIN — Medication 1 SPRAY: at 23:30

## 2024-02-15 RX ADMIN — METHYLPREDNISOLONE SODIUM SUCCINATE 60 MG: 125 INJECTION, POWDER, FOR SOLUTION INTRAMUSCULAR; INTRAVENOUS at 21:24

## 2024-02-15 RX ADMIN — Medication 1 APPLICATION: at 21:24

## 2024-02-15 RX ADMIN — Medication 1 APPLICATION: at 00:44

## 2024-02-15 RX ADMIN — PROPOFOL 30 MCG/KG/MIN: 10 INJECTION, EMULSION INTRAVENOUS at 18:49

## 2024-02-15 RX ADMIN — Medication 1 APPLICATION: at 17:27

## 2024-02-15 RX ADMIN — FAMOTIDINE 20 MG: 40 POWDER, FOR SUSPENSION ORAL at 09:29

## 2024-02-15 RX ADMIN — Medication 1 APPLICATION: at 04:47

## 2024-02-15 RX ADMIN — SULFAMETHOXAZOLE AND TRIMETHOPRIM 2.5 TABLET: 800; 160 TABLET ORAL at 09:29

## 2024-02-15 RX ADMIN — SILVER NITRATE APPLICATORS 1 APPLICATOR: 25; 75 STICK TOPICAL at 15:15

## 2024-02-15 RX ADMIN — VENLAFAXINE 25 MG: 25 TABLET ORAL at 13:00

## 2024-02-15 RX ADMIN — Medication 1 APPLICATION: at 14:00

## 2024-02-15 RX ADMIN — Medication 1 SPRAY: at 09:30

## 2024-02-15 RX ADMIN — NYSTATIN: 100000 POWDER TOPICAL at 09:30

## 2024-02-15 RX ADMIN — TOPIRAMATE 100 MG: 100 TABLET, FILM COATED ORAL at 09:31

## 2024-02-15 RX ADMIN — Medication 1 SPRAY: at 02:56

## 2024-02-15 RX ADMIN — PROPOFOL 30 MCG/KG/MIN: 10 INJECTION, EMULSION INTRAVENOUS at 11:14

## 2024-02-15 RX ADMIN — QUETIAPINE FUMARATE 12.5 MG: 25 TABLET ORAL at 21:24

## 2024-02-15 RX ADMIN — Medication 1 APPLICATION: at 19:31

## 2024-02-15 RX ADMIN — Medication 1 SPRAY: at 21:24

## 2024-02-15 RX ADMIN — Medication 1 SPRAY: at 05:48

## 2024-02-15 RX ADMIN — Medication 1 SPRAY: at 04:47

## 2024-02-15 RX ADMIN — Medication 1 APPLICATION: at 07:46

## 2024-02-15 RX ADMIN — NYSTATIN: 100000 POWDER TOPICAL at 17:28

## 2024-02-15 RX ADMIN — Medication 1 SPRAY: at 14:00

## 2024-02-15 NOTE — PROGRESS NOTES
[facility]  Progress Note  Name: Carla Hunt I  MRN: [id number]  Unit/Bed#: MICU 10 I Date of Admission: 1/10/2024   Date of Service: 2/15/2024 I Hospital Day: 36    Assessment/Plan   Goals of care, counseling/discussion  Assessment & Plan  Per previous discussion with palliative care provider, family aware of guarded prognosis. Spouse does have a clear limit that the patient has already placed which is no tracheostomy or long-term ventilator support.  Plan for ongoing conversation as clinical situation evolves.    Palliative care patient  Assessment & Plan  Decisional apparatus:  Patient is competent on my exam today.  If competence is lost, patient's substitute decision maker would default to spouse [place] 169.  Advance Directive / Living Will / POLST:  None on file    PSC, including MSW support, will follow closely to continue to provide supportive care as clinical situation evolves    Teto marginal zone B-cell lymphoma (HCC)  Assessment & Plan  On maintenance therapy    Anxiety state  Assessment & Plan  Continue Effexor    * Acute respiratory failure with hypoxia (HCC)  Assessment & Plan  Currently intubated, management per ICU team           Interval history:  Patient resting comfortably.  On ventilator.  Eyes remain closed during encounter but patient nods and shakes head to respond to questions.  She denies any pain.  She is agreeable to us checking in with her family.    MEDICATIONS / ALLERGIES:     all current active meds have been reviewed and current meds:   Current Facility-Administered Medications   Medication Dose Route Frequency    acetaminophen (TYLENOL) tablet 650 mg  650 mg Oral Q6H PRN    bisacodyl (DULCOLAX) rectal suppository 10 mg  10 mg Rectal Daily PRN    calcium carbonate (TUMS) chewable tablet 500 mg  500 mg Oral BID PRN    chlorhexidine (PERIDEX) 0.12 % oral rinse 15 mL  15 mL Mouth/Throat Q12H SARTHAK    enoxaparin (LOVENOX) subcutaneous  injection 40 mg  40 mg Subcutaneous Daily    famotidine (PEPCID) oral suspension 20 mg  20 mg Oral Daily    HYDROmorphone (DILAUDID) injection 0.5 mg  0.5 mg Intravenous Q1H PRN    insulin lispro (HumaLOG) 100 units/mL subcutaneous injection 1-5 Units  1-5 Units Subcutaneous Q6H [facility]    lamoTRIgine (LaMICtal) tablet 150 mg  150 mg Oral BID    methylPREDNISolone sodium succinate (Solu-MEDROL) injection 60 mg  60 mg Intravenous Q12H [facility]    metoprolol (LOPRESSOR) injection 2.5 mg  2.5 mg Intravenous Q6H PRN    nystatin (MYCOSTATIN) powder   Topical BID    ondansetron (ZOFRAN) injection 4 mg  4 mg Intravenous Q6H PRN    oxymetazoline (AFRIN) 0.05 % nasal spray 6 spray  6 spray Each Nare BID PRN    pantoprazole (PROTONIX) injection 40 mg  40 mg Intravenous Q24H [facility]    polyethylene glycol (MIRALAX) packet 17 g  17 g Oral Daily    propofol (DIPRIVAN) 1000 mg in 100 mL infusion (premix)  5-100 mcg/kg/min Intravenous Titrated    QUEtiapine (SEROquel) tablet 12.5 mg  12.5 mg Oral HS    senna-docusate sodium (SENOKOT S) 8.6-50 mg per tablet 2 tablet  2 tablet Oral BID PRN    silver nitrate-potassium nitrate (ARZOL SILVER NITRATE) 75-25 % applicator 1 applicator  1 applicator Topical Once    sodium chloride (AYR SALINE NASAL) nasal gel 1 Application  1 Application Nasal Q2H    sodium chloride (OCEAN) 0.65 % nasal spray 1 spray  1 spray Each Nare Q2H    sulfamethoxazole-trimethoprim (BACTRIM DS) 800-160 mg per tablet 2.5 tablet  5 mg/kg of trimethoprim Oral TID    topiramate (TOPAMAX) tablet 100 mg  100 mg Oral BID    venlafaxine (EFFEXOR) tablet 25 mg  25 mg Per NG Tube TID With Meals       No Known Allergies    OBJECTIVE:    Physical Exam  Physical Exam  Vitals and nursing note reviewed.   Constitutional:       General: She is not in acute distress.     Appearance: She is well-developed. She is ill-appearing.   HENT:      Head: Normocephalic and atraumatic.   Pulmonary:      Effort: No respiratory distress.      Comments: On  mechanical ventilation  Abdominal:      General: There is no distension.   Musculoskeletal:         General: No deformity.      Cervical back: Neck supple.   Skin:     General: Skin is warm and dry.   Neurological:      Mental Status: She is alert.      Comments: Alert, nonverbal with ventilator in place   Psychiatric:         Mood and Affect: Mood normal.         Lab Results:   Results from last 7 days   Lab Units 02/15/24  0448 02/14/24  0552 02/13/24  0552 02/10/24  0554 02/09/24  0832   WBC Thousand/uL 7.77 9.36 7.29   < > 6.63   HEMOGLOBIN g/dL 9.5* 10.0* 10.1*   < > 11.4*   HEMATOCRIT % 29.4* 30.2* 31.3*   < > 36.0   PLATELETS Thousands/uL 210 202 173   < > 157   MONO PCT %  --   --  2*  --  4   EOS PCT %  --   --  0  --  0    < > = values in this interval not displayed.     Results from last 7 days   Lab Units 02/15/24  0448 02/14/24  0552 02/13/24  0552 02/12/24  0447   POTASSIUM mmol/L 5.1 5.2 5.0 4.0   CHLORIDE mmol/L 104 104 106 105   CO2 mmol/L 21 22 23 23   BUN mg/dL 38* 33* 31* 31*   CREATININE mg/dL 1.15 0.99 0.95 0.91   CALCIUM mg/dL 9.5 9.6 9.1 9.6   ALK PHOS U/L  --   --   --  72   ALT U/L  --   --   --  35   AST U/L  --   --   --  22       Imaging Studies: reviewed pertinent studies   EKG, Pathology, and Other Studies: reviewed pertinent studies    Counseling / Coordination of Care    Total floor / unit time spent today 30 minutes. Greater than 50% of total time was spent with the patient and / or family counseling and / or coordination of care. A description of the counseling / coordination of care: Discussion of patient's symptoms, treatment plan.  Coordination of care with primary team, , patient's family.

## 2024-02-15 NOTE — ASSESSMENT & PLAN NOTE
Decisional apparatus:  Patient is competent on my exam today.  If competence is lost, patient's substitute decision maker would default to spouse [place] 169.  Advance Directive / Living Will / POLST:  None on file    PSC, including MSW support, will follow closely to continue to provide supportive care as clinical situation evolves

## 2024-02-15 NOTE — PROGRESS NOTES
Progress Note - Infectious Disease   Carla Hunt 59 y.o. female MRN: [id number]  [facility]: [id number]      Impression/Recommendations:  Recurrent acute hypoxic respiratory failure.  Initially, on admission early January, secondary to mostly COVID but there was concern for concurrent bacterial pneumonia on admission due to elevated procalcitonin. Patient completed treatment course for both COVID and bacterial pneumonia.  She was clinically improved with decreasing O2 requirement.  However, patient deteriorated in late January, requiring to be placed back on high flow O2 support.  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  Procalcitonin x 3 were in the normal range.  Patient has no fever or leukocytosis.  She improved with increasing steroid dose.  She completed a 5-day course of Bactrim/minocycline for presumptive stenotrophomonas respiratory infection, with expectorated sputum growing stenotrophomonas.  She subsequently had bronchoscopy, with BAL culture negative for bacterial growth, AFB and PCP but completed the course of Bactrim/minocycline prior to bronchoscopy.  Patient improved and O2 was being weaned again until 2 weeks ago, when she deteriorated again.  She is now back in ICU.  Expectorated sputum once again is growing stenotrophomonas.  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  Not sure that the stenotrophomonas that is growing again and expectorated sputum is pathogenic versus upper airway colonization.  Patient is now back on high-dose steroid and Bactrim.  Patient's respiratory status remained tenuous throughout, required intubation subsequently.  Now the patient is intubated, bronchoscopy would be helpful in ruling out opportunistic infections.  Continue high-dose Bactrim.  As long as patient can tolerate, treat x 14 day course this time around.  Continue high-dose systemic corticosteroid,  vetetri and IVIG per critical service  Monitor temperature/WBC.  Now that patient is intubated, consider bronchoscopy to assess for opportunistic infections.     Severe COVID, present on admission.  Patient was treated with remdesivir, dexamethasone and was given 1 dose of Tocilizumab on admission.  She also had a course of antibiotic initially for presumptive bacterial superinfection.  COVID antigen was negative prior to coming off isolation.  Current chest CT is showing extensive fibrotic changes.  No additional antiviral needed.     CKD.  Creatinine improved.  Monitor creatinine.     Encephalopathy on admission.  This has resolved.  There was concern for possible seizure but no witnessed seizure and EEG did not capture it.  Monitor mental status.     Seizure disorder, status post temporal lobe resection in .     B-cell lymphoma, on chemotherapy, which is currently postponed.  Patient was leukopenic on admission but WBC now normal.  Monitor WBC/ANC.     Discussed with patient.  Discussed with Dr. Cardenas from pulmonary service regarding consideration for bronchoscopy now that patient is intubated, to assess for any underlying opportunistic infection..       Antibiotics:  Bactrim # 6     Subjective:  Patient remains intubated.  She is awake and alert and attempts communicate  Temperature stays down.  No chills.  She is tolerating antibiotic well.  No nausea, vomiting or diarrhea.        Objective:  Vitals:  Temp:  [98.8 °F (37.1 °C)-99.9 °F (37.7 °C)] 99.1 °F (37.3 °C)  HR:  [71-98] 83  Resp:  [12-22] 20  SpO2:  [89 %-97 %] 94 %  Temp (24hrs), Av.3 °F (37.4 °C), Min:98.8 °F (37.1 °C), Max:99.9 °F (37.7 °C)  Current: Temperature: 99.1 °F (37.3 °C)    Physical Exam:     General: Awake, alert, and communicative, comfortable, nontoxic.   Neck:  Supple. No mass.  No lymphadenopathy.   Lungs: Expansion symmetric, mild diffuse rhonchi, no rales, no wheezing.   Heart:  Regular rate and rhythm, S1 and S2 normal, no  murmur.   Abdomen: Soft, nondistended, non-tender, bowel sounds active all four quadrants, no masses, no organomegaly.   Extremities: Mild leg edema. No erythema/warmth. No ulcer. Nontender to palpation.   Skin:  No rash.   Neuro: Moves all extremities spontaneously.     Invasive Devices       Peripheral Intravenous Line  Duration             Peripheral IV 02/13/24 Distal;Right;Ventral (anterior) Forearm 2 days    Peripheral IV 02/14/24 Distal;Left;Upper;Ventral (anterior) Arm 1 day    Peripheral IV 02/14/24 Left;Ventral (anterior) Forearm 1 day              Arterial Line  Duration             Arterial Line 02/14/24 Radial 1 day              Drain  Duration             NG/OG/Enteral Tube Orogastric Right mouth 1 day    Urethral Catheter Temperature probe 16 Fr. 1 day              Airway  Duration             ETT  Cuffed 7.5 mm 1 day                    Labs studies:   I have personally reviewed pertinent labs.  Results from last 7 days   Lab Units 02/15/24  0448 02/14/24  0552 02/13/24  0552 02/12/24  0447   POTASSIUM mmol/L 5.1 5.2 5.0 4.0   CHLORIDE mmol/L 104 104 106 105   CO2 mmol/L 21 22 23 23   BUN mg/dL 38* 33* 31* 31*   CREATININE mg/dL 1.15 0.99 0.95 0.91   EGFR ml/min/1.73sq m 52 63 66 69   CALCIUM mg/dL 9.5 9.6 9.1 9.6   AST U/L  --   --   --  22   ALT U/L  --   --   --  35   ALK PHOS U/L  --   --   --  72     Results from last 7 days   Lab Units 02/15/24  0448 02/14/24  0552 02/13/24  0552   WBC Thousand/uL 7.77 9.36 7.29   HEMOGLOBIN g/dL 9.5* 10.0* 10.1*   PLATELETS Thousands/uL 210 202 173     Results from last 7 days   Lab Units 02/14/24  1340 02/10/24  1516 02/09/24  1203   SPUTUM CULTURE  Culture too young- will reincubate  --  4+ Growth of Stenotrophomonas maltophilia*  2+ Growth of   GRAM STAIN RESULT  2+ Polys  1+ Epithelial Cells  No bacteria seen  --  No Epithelial cells per low power field*  No Polys*  4+ Gram negative rods*  Rare Gram positive cocci in clusters*   MRSA CULTURE ONLY   --   No Methicillin Resistant Staphlyococcus aureus (MRSA) isolated  --        Imaging Studies:   I have personally reviewed pertinent imaging study reports and images in PACS.    EKG, Pathology, and Other Studies:   I have personally reviewed pertinent reports.

## 2024-02-15 NOTE — PROGRESS NOTES
[facility]  Progress Note: Critical Care  Name: Carla Hunt 59 y.o. female I MRN: [id number]  [facility] Date of Admission: 1/10/2024   Date of Service: 2/15/2024 I Hospital Day: 36    Assessment/Plan     Acute hypoxic respiratory failure due to severe covid/rituxan induced pneumonitis  B cell lymphoma  CKD stage III  Seizure disorder  Anxiety     Neuro:   Sedation: On propofol 15, resting comfortably with dilaudid 0.5 mg Q1H PRN.    Diagnosis: seizure disorder  -S/p partial left temporal lobe resection in 2007  -Contuinue home lamictal 150 bid and topamax 100 bid     Diagnosis: Anxiety  -On venlafaxine 25 mg TID via NGT  -Seroquel 12.5 hs and melatonin 6 hs for sleep     CV:   No active issues    Pulm:  Diagnosis: Acute hypoxic respiratory failure due to severe covid  and covid induced fibrosis  -Tested COVID positive on 1/7  -Completed severe COVID pathway and 7 days CTX  -Tenuous respiratory status requiring multiple re-admissions to MICU  -S/p Bronch 2/2   -NG on cultures (initially showed non-group A strep)  -PCP and AFB negative   -Legionella, viral, fungal cultures no growth to date  -Pulse dose steroids with solumedrol 1g daily x3 days (completed 2/7) then transitioned to prednisone 80mg daily on 2/8  -Vent settings: SIMV 14/8/60%  -Currently on solumedrol 60 mg Q12H  -CRP trend 31 (02/11)>21.0 (02/12)>15.8 (02/14) > 8.3 (02/15). Can continue with steroid taper  -Weaned off veletri  -On IVIG, day 5 today. With CRP downtrending, can consider stopping IVIG at this point.     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) though unclear how this was diagnosed, this also does not enirely fit as she is NOT  hypokalemic  -Continue to monitor I/O and UOP    Diagnosis: Hyponatremia  -Noted to have sodium 133>129 today  -Etiology: Could be SIADH in setting of infection, related to SSRI use or possibly related to TMP-SMX which is rare but possible  -Urine osmolality, urine sodium and osmolality ordered  -Will continue to monitor this     F/E/N:   F: none, boluses as indicated   E: monitor and replete for goal K>4, P>3, Mg>2  N: Tube feeds started today     Heme/Onc:   Diagnosis: B cell lymphoma  -Follows with heme[facility]onc at [facility]  -On rituxan for 2 year course, started May 2022  -Last dose was 12/20, treatment currently on hold   -Leukopenic on admission but WBC now wnl     DVT ppx with lovenox      Endo:   Diagnosis: steroid hyperglycemia and diabetes mellitus type 2, A1c at 6.6 from 01/2024  -SSI, hypoglycemic protocol  -On SSI  -On tube feeds, will add scheduled lispro if needed  -Goal -180     ID:   Diagnosis: Severe covid pneumonia and stenotrophomonas pneumonia  -Completed severe COVID pathway with decadron (ended 1/22) and remdesivir (ended 1/20), also completed 7 days CXT on 1/15  -C/f opportunistic infections given immunocompromised status on chemotherapy and recent steroid use  -No consolidations on CXR and procal negative  -S/p bactrim and minocycline x5 days for stenotrophomonas on sputum culture (1/25), then on bactrim for PJP ppx  -Bronc on 2/2 - Cx w/o growth (initially resulted as 1+ alpha hemolytic strep), AFB & PCP negative, f/u fungal, legionella, and viral cultures   -2/10 pt again had escalating O2 requirements on floors necessitating readmission to ICU  -Concern that with recent pulse dose steroids and now worsening respiratory status she could have infection, possibly opportunistic   -Repeat sputum culture 2/9 with 4+ stenotrophomonas  -On high dose bactrim, day 6 today. Plan to treat for a 14 day course.         MSK/Skin:   -Wound care team following for bilateral sacral wounds    Disposition:  Critical care    ICU Core Measures     Vented Patient  VAP Bundle  VAP bundle ordered     A: Assess, Prevent, and Manage Pain Has pain been assessed? Yes  Need for changes to pain regimen? No   B: Both Spontaneous Awakening Trials (SATs) and Spontaneous Breathing Trials (SBTs) Plan to perform spontaneous awakening trial today? Yes   Plan to perform spontaneous breathing trial today? Yes   Obvious barriers to extubation? Yes   C: Choice of Sedation RASS Goal: 0 Alert and Calm  Need for changes to sedation or analgesia regimen? No   D: Delirium CAM-ICU: Negative   E: Early Mobility  Plan for early mobility? Yes   F: Family Engagement Plan for family engagement today? Yes       Antibiotic Review: Patient on appropriate coverage based on culture data.     Review of Invasive Devices:    Ortiz Plan: Continue for accurate I/O monitoring for 48 hours    Amelia Plan: Keep arterial line for hemodynamic monitoring and frequent ABGs    Prophylaxis:  VTE VTE covered by:  enoxaparin, Subcutaneous, 40 mg at 02/14/24 1000       Stress Ulcer  covered byfamotidine (PEPCID) oral suspension 20 mg [[id number]], pantoprazole (PROTONIX) injection 40 mg [[id number]]        Significant 24hr Events     24hr events: No acute events overnight.      Subjective   Patient seen by bedside. Plan to discuss updates and medical treatment plan with patients family today.     Review of Systems   Unable to perform ROS: Patient nonverbal        Objective                            Vitals I/O      Most Recent Min/Max in 24hrs   Temp 99.7 °F (37.6 °C) Temp  Min: 98.4 °F (36.9 °C)  Max: 99.7 °F (37.6 °C)   Pulse 91 Pulse  Min: 71  Max: 91   Resp 19 Resp  Min: 12  Max: 22   BP 90/50 BP  Min: 90/50  Max: 95/58   O2 Sat 94 % SpO2  Min: 89 %  Max: 97 %   On propofol drip 15  Vent setting: spont 8/8/60%    Intake/Output Summary (Last 24 hours) at 2/15/2024 0709  Last data filed at 2/15/2024 0601  Gross per 24 hour   Intake 886.93 ml   Output 1695 ml   Net -808.07  ml       Diet Enteral/Parenteral; Tube Feeding No Oral Diet; Glucerna 1.2; Continuous; 20; 100; Every 6 hours    Invasive Monitoring   Arterial Line  [place] /57  Arterial Line BP  Min: 89/46  Max: 123/58   MAP 74 mmHg  Arterial Line MAP (mmHg)  Min: 59 mmHg  Max: 80 mmHg           Physical Exam   Physical Exam  Vitals reviewed.   Skin:     General: Skin is warm.      Capillary Refill: Capillary refill takes less than 2 seconds.   HENT:      Head: Normocephalic.      Nose:      Comments: Dried blood clot noted in both nares, more so on right side. No active epistaxis at this time.     Mouth/Throat:      Mouth: Mucous membranes are moist.   Cardiovascular:      Rate and Rhythm: Normal rate and regular rhythm.      Pulses: Normal pulses.   Abdominal: General: Bowel sounds are normal.      Palpations: Abdomen is soft.   Constitutional:       General: She is sleeping.      Interventions: She is sedated.      Comments: Patient with ETT and NGT in place. Has an arterial line onf left side. Ortiz in place.    Pulmonary:      Effort: Pulmonary effort is normal.      Comments: Mechanically ventilated breath sounds  Neurological:      Mental Status: She is calm.      Comments: Able to nod and follow commands on exam. Moving extremities spontaneously.    Genitourinary/Anorectal:  Ortiz present.          Diagnostic Studies      EKG: Reviewed  Imaging: Reviewed I have personally reviewed pertinent reports.       Medications:  Scheduled PRN   chlorhexidine, 15 mL, Q12H SARTHAK  enoxaparin, 40 mg, Daily  famotidine, 20 mg, Daily  immune globulin, human, 400 mg/kg (Adjusted), Daily  insulin lispro, 1-5 Units, Q6H SARTHAK  lamoTRIgine, 150 mg, BID  methylPREDNISolone sodium succinate, 60 mg, Q12H SARTHAK  nystatin, , BID  pantoprazole, 40 mg, Q24H SARTHAK  polyethylene glycol, 17 g, Daily  QUEtiapine, 12.5 mg, HS  sodium chloride, 1 Application, Q2H  sodium chloride, 1 spray, Q2H  sulfamethoxazole-trimethoprim, 5 mg/kg of trimethoprim,  TID  topiramate, 100 mg, BID  venlafaxine, 25 mg, TID With Meals      acetaminophen, 650 mg, Q6H PRN  bisacodyl, 10 mg, Daily PRN  calcium carbonate, 500 mg, BID PRN  HYDROmorphone, 0.5 mg, Q1H PRN  metoprolol, 2.5 mg, Q6H PRN  ondansetron, 4 mg, Q6H PRN  oxymetazoline, 6 spray, BID PRN  senna-docusate sodium, 2 tablet, BID PRN       Continuous    epoprostenol, 6.25-50 ng/kg/min (Ideal), Last Rate: Stopped (02/14/24 1812)  fentaNYL, 50 mcg/hr, Last Rate: Stopped (02/14/24 1130)  norepinephrine, 1-30 mcg/min, Last Rate: Stopped (02/13/24 2013)  propofol, 5-100 mcg/kg/min, Last Rate: 30 mcg/kg/min (02/14/24 2228)         Labs:    CBC    Recent Labs     02/14/24 0552 02/15/24  0448   WBC 9.36 7.77   HGB 10.0* 9.5*   HCT 30.2* 29.4*    210     BMP    Recent Labs     02/14/24 0552 02/15/24  0448   SODIUM 133* 129*   K 5.2 5.1    104   CO2 22 21   AGAP 7 4   BUN 33* 38*   CREATININE 0.99 1.15   CALCIUM 9.6 9.5       Coags    No recent results      Blood glucose range:  Additional Electrolytes  Recent Labs     02/14/24 0552 02/15/24  0448   MG 2.2 2.4          Blood Gas    Recent Labs     02/15/24  0448   PHART 7.401   VQV4CII 34.7*   PO2ART 95.5   ZKD6HFB 21.1*   BEART -3.1   SOURCE Line, Arterial     Recent Labs     02/15/24  0448   SOURCE Line, Arterial    LFTs  No recent results    Infectious  No recent results  Glucose  Recent Labs     02/14/24 0552 02/15/24  0448   GLUC 119 113             Miguel Whittaker MD

## 2024-02-15 NOTE — CASE MANAGEMENT
Case Management Discharge Planning Note    Patient name Carla Hunt  [id number]  : 1965 Date 2/15/2024       Current Admission Date: 1/10/2024  Current Admission Diagnosis:Acute respiratory failure with hypoxia (HCC)   Patient Active Problem List    Diagnosis Date Noted    Palliative care patient 2024    Goals of care, counseling/discussion 2024    Hypotension 2024    Pneumonia of both lungs due to infectious organism 2024    Seizure disorder ([facility]) 2024    Sepsis ([facility]) 2024    Acute respiratory failure with hypoxia ([facility]) 2024    Transaminitis 2024    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    COVID 2024    Stage 3b chronic kidney disease (CKD) ([facility]) 2024    Abnormal CT of the head 2024    Nephrolithiasis 2021    Other specified anxiety disorders 2019    Reactive depression 2019    Hidradenitis suppurativa of left axilla 2019    Anxiety state 2018    Luetscher's syndrome 2018    Disorder of parathyroid gland ([facility]) 2018    Eczema 2018    Gastroenteritis 2018    Grand mal status ([facility]) 2018    Hypercalcemia 2018    Hypertensive disorder 2018    Impacted cerumen 2018    Open wound of hand except fingers 2018    Otitis externa 2018    Overweight 2018    Pain in face 2018    Skin sensation disturbance 2018    Subjective visual disturbance 2018    Encounter for gynecological examination (general) (routine) without abnormal findings 10/23/2018    Long term current use of hormonal contraceptive 10/23/2018    Rosacea 2015      LOS (days): 36  Geometric Mean LOS (GMLOS) (days):   Days to GMLOS:     OBJECTIVE:  Risk of Unplanned Readmission Score: 29.71         Current admission status: Inpatient   Preferred Pharmacy:   [id number] [place] [facility] - [address]  [address] [place], [facility] [address]  Phone: [phone] Fax: [phone]    [facility] - [place], [place] - [address]  [address] [facility] [address]  Phone: [phone] Fax: [phone]    Primary Care Provider: Tony Guevara MD    Primary Insurance: [employer]  Secondary Insurance: MEDICARE    DISCHARGE DETAILS:       Additional Comments: Palliaitve care is involved with this patient's family for ongoing GOC.  Patient is currently intubated.  CM to be available as needed for support and DC planning needs.  CM to be available

## 2024-02-15 NOTE — UTILIZATION REVIEW
"Continued Stay Review    Date: 02/15                          Current Patient Class: IP  Current Level of Care: Level 2 stepdown/HOT    HPI:59 y.o. female initially admitted on 01/10     Assessment/Plan: No acute ovn events. On exam, lethargic but easily arousable. Intubated. Clear to auscultation bilaterally. Soft, nontender, nondistended. + Lower extremity edema  Plan: On MV- FiO2 of 50% and PEEP of 8. Veletri weaned off. Cont IV Solu Medrol. Start TF. Cont Daily SBTs. amotrigine and topiramate for seizure disorder. Continue Seroquel nightly. Continue venlafaxine. Continue sedation for RASS goal of 0 to -1. Strict I/Os, Cont to mon renal function. Cont Bactrim. ID [id number].     Vital Signs: BP 90/50   Pulse 84   Temp 99.9 °F (37.7 °C)   Resp 21   Ht 5' 8\" (1.727 m)   Wt 72.3 kg (159 lb 6.3 oz)   SpO2 93%   BMI 24.24 kg/m²       Pertinent Labs/Diagnostic Results:       Results from last 7 days   Lab Units 02/15/24  0448 02/14/24  0552 02/13/24  0552 02/12/24 0447 02/11/24  0450 02/10/24  0554 02/09/24  0832   WBC Thousand/uL 7.77 9.36 7.29 9.11 8.24   < > 6.63   HEMOGLOBIN g/dL 9.5* 10.0* 10.1* 10.5* 10.8*   < > 11.4*   HEMATOCRIT % 29.4* 30.2* 31.3* 32.5* 34.4*   < > 36.0   PLATELETS Thousands/uL 210 202 173 186 165   < > 157   BANDS PCT %  --   --  3  --   --   --  8    < > = values in this interval not displayed.         Results from last 7 days   Lab Units 02/15/24  0448 02/14/24  0552 02/13/24  0552 02/12/24  0447 02/11/24  0450   SODIUM mmol/L 129* 133* 135 137 139   POTASSIUM mmol/L 5.1 5.2 5.0 4.0 4.2   CHLORIDE mmol/L 104 104 106 105 105   CO2 mmol/L 21 22 23 23 26   ANION GAP mmol/L 4 7 6 9 8   BUN mg/dL 38* 33* 31* 31* 31*   CREATININE mg/dL 1.15 0.99 0.95 0.91 0.88   EGFR ml/min/1.73sq m 52 63 66 69 72   CALCIUM mg/dL 9.5 9.6 9.1 9.6 9.5   MAGNESIUM mg/dL 2.4 2.2 2.1 2.0 2.1   PHOSPHORUS mg/dL  --   --   --  3.3 3.1     Results from last 7 days   Lab Units 02/12/24  0447   AST U/L 22   ALT U/L 35 " "  ALK PHOS U/L 72   TOTAL PROTEIN g/dL 5.9*   ALBUMIN g/dL 2.9*   TOTAL BILIRUBIN mg/dL 0.29     Results from last 7 days   Lab Units 02/15/24  1157 02/15/24  0515 02/15/24  0046 02/14/24  1855 02/14/24  1157 02/14/24  0026 02/13/24  1707 02/13/24  1148 02/13/24  0805 02/12/24  2228 02/12/24  1722 02/12/24  1126   POC GLUCOSE mg/dl 96 118 134 123 94 110 125 131 155* 103 205* 115     Results from last 7 days   Lab Units 02/15/24  0448 02/14/24  0552 02/13/24  0552 02/12/24  0447 02/11/24  0450 02/10/24  0554 02/09/24  0628   GLUCOSE RANDOM mg/dL 113 119 160* 95 135 176* 100     Results from last 7 days   Lab Units 02/15/24  0752   OSMOLALITY, SERUM mmol/*         No results found for: \"BETA-HYDROXYBUTYRATE\"   Results from last 7 days   Lab Units 02/15/24  0448 02/14/24  0556 02/13/24  2050   PH ART  7.401 7.382 7.358   PCO2 ART mm Hg 34.7* 38.5 36.7   PO2 ART mm Hg 95.5 107.8 104.0   HCO3 ART mmol/L 21.1* 22.4 20.2*   BASE EXC ART mmol/L -3.1 -2.4 -4.7   O2 CONTENT ART mL/dL 15.6* 15.0* 15.1*   O2 HGB, ARTERIAL % 95.9 96.9 96.3   ABG SOURCE  Line, Arterial Line, Arterial Radial, Left         Results from last 7 days   Lab Units 02/11/24  0450 02/09/24  1717   BNP pg/mL 36 75                         Results from last 7 days   Lab Units 02/15/24  0448 02/14/24  0552 02/12/24  0447 02/11/24  0450   CRP mg/L 8.3* 15.8* 21.0* 31.6*         Results from last 7 days   Lab Units 02/15/24  0752   OSMOLALITY, SERUM mmol/*   OSMO UR mmol/     Results from last 7 days   Lab Units 02/15/24  0752   SODIUM UR  57           Results from last 7 days   Lab Units 02/14/24  1340 02/09/24  1203   SPUTUM CULTURE  Culture too young- will reincubate 4+ Growth of Stenotrophomonas maltophilia*  2+ Growth of   GRAM STAIN RESULT  2+ Polys  1+ Epithelial Cells  No bacteria seen No Epithelial cells per low power field*  No Polys*  4+ Gram negative rods*  Rare Gram positive cocci in clusters*                   Medications: "   Scheduled Medications:  chlorhexidine, 15 mL, Mouth/Throat, Q12H SARTHAK  enoxaparin, 40 mg, Subcutaneous, Daily  famotidine, 20 mg, Oral, Daily  insulin lispro, 1-5 Units, Subcutaneous, Q6H SARTHAK  lamoTRIgine, 150 mg, Oral, BID  methylPREDNISolone sodium succinate, 60 mg, Intravenous, Q12H SARTHAK  nystatin, , Topical, BID  pantoprazole, 40 mg, Intravenous, Q24H SARTHAK  polyethylene glycol, 17 g, Oral, Daily  QUEtiapine, 12.5 mg, Oral, HS  silver nitrate-potassium nitrate, 1 applicator, Topical, Once  sodium chloride, 1 Application, Nasal, Q2H  sodium chloride, 1 spray, Each Nare, Q2H  sulfamethoxazole-trimethoprim, 5 mg/kg of trimethoprim, Oral, TID  topiramate, 100 mg, Oral, BID  venlafaxine, 25 mg, Per NG Tube, TID With Meals      Continuous IV Infusions:  propofol, 5-100 mcg/kg/min, Intravenous, Titrated  epoprostenol (VELETRI) 30,000 ng/mL in sodium chloride 0.9 % 50 mL inhalation solution  Rate: 0.8-6.4 mL/hr Dose: 6.25-50 ng/kg/min  Weight Dosing Info: 63.9 kg (Ideal)  Freq: Titrated Route: IN  Last Dose: Stopped (02/14/24 1812)  Start: 02/11/24 1030 End: 02/15/24 0741   fentaNYL 1000 mcg in sodium chloride 0.9% 100mL infusion  Rate: 5 mL/hr Dose: 50 mcg/hr  Freq: Continuous Route: IV  Last Dose: Stopped (02/14/24 1130)  Start: 02/13/24 1800 End: 02/15/24 1045       PRN Meds:  acetaminophen, 650 mg, Oral, Q6H PRN  bisacodyl, 10 mg, Rectal, Daily PRN  calcium carbonate, 500 mg, Oral, BID PRN  HYDROmorphone, 0.5 mg, Intravenous, Q1H PRN  metoprolol, 2.5 mg, Intravenous, Q6H PRN  ondansetron, 4 mg, Intravenous, Q6H PRN  oxymetazoline, 6 spray, Each Nare, BID PRN  senna-docusate sodium, 2 tablet, Oral, BID PRN        Discharge Plan: TBD    Network Utilization Review Department  ATTENTION: Please call with any questions or concerns to [phone] and carefully listen to the prompts so that you are directed to the right person. All voicemails are confidential.   For Discharge needs, contact Care Management DC Support Team  at [phone] opt. [phone]  Send all requests for admission clinical reviews, approved or denied determinations and any other requests to dedicated fax number below belonging to the campus where the patient is receiving treatment. List of dedicated fax numbers for the Facilities:  FACILITY NAME UR FAX NUMBER   ADMISSION DENIALS (Administrative/Medical Necessity) [phone]   DISCHARGE SUPPORT TEAM (NETWORK) [phone]   PARENT CHILD HEALTH (Maternity/NICU/Pediatrics) [phone]   [facility] [phone]   [facility] [phone]   [facility] [phone]   [facility] [phone]   [facility] [phone]   [facility] [phone]   [facility] [phone]   [facility] [phone]   [facility] [phone]   [facility] [phone]   [facility] [phone]   [facility] [phone]

## 2024-02-15 NOTE — PHYSICAL THERAPY NOTE
Physical Therapy Cancellation Note    PT orders received chart review completed. Pt is currently intubated/sedated and not appropriate to participate in skilled PT at this time. PT will follow and eval as medically appropriate.     02/15/24 1100   Note Type   Note type Cancelled Session   Cancel Reasons Intubated/sedated       Tania Augustin, PT

## 2024-02-15 NOTE — OCCUPATIONAL THERAPY NOTE
Occupational Therapy Cancel Note         02/15/24 0823   OT Last Visit   OT Visit Date 02/15/24   Note Type   Note Type Cancelled Session   Cancel Reasons Intubated/sedated       Lynda Rogers, OT

## 2024-02-15 NOTE — ASSESSMENT & PLAN NOTE
Per previous discussion with palliative care provider, family aware of guarded prognosis. Spouse does have a clear limit that the patient has already placed which is no tracheostomy or long-term ventilator support.  Plan for ongoing conversation as clinical situation evolves.

## 2024-02-15 NOTE — RESPIRATORY THERAPY NOTE
RT Ventilator Management Note  Carla Hunt 59 y.o. female MRN: [id number]  Unit/Bed#: [facility] 10 Encounter: [id number]      Daily Screen         2/14/2024  0800 2/15/2024  0742          Patient safety screen outcome:: Failed Passed      Not Ready for Weaning due to:: Underline problem not resolved --                Physical Exam:   Assessment Type: Assess only  General Appearance: Sedated  Respiratory Pattern: Assisted  Chest Assessment: Chest expansion symmetrical  Bilateral Breath Sounds: Coarse, Diminished  Suction: ET Tube      Resp Comments: Pt received on APVcmv, switched to PS 8/8. Tolerating well. Vt and RR WNL. RT will cont to monitor.     02/15/24 0742   Respiratory Assessment   Resp Comments Pt received on APVcmv, switched to PS 8/8. Tolerating well. Vt and RR WNL. RT will cont to monitor.   Vent Information   Vent ID [id number]   Vent type [employer] G5   Summers Vent Mode SPONT   $ Pulse Oximetry Spot Check Charge Completed   SPONT Settings   FIO2 (%) 60 %   PEEP (cmH2O) 8 cmH2O   Pressure Support (cmH2O) 8 cmH20   Trigger Sensitivity Pressure (cm H2O)  2 cm H2O   P-ramp (ms) 50 ms   ETS  (%) 25 %   Humidification Heater   Heater Temp 95 °F (35 °C)   SPONT Actuals   Resp Rate (BPM) 22 BPM   VT (mL) 679 mL   MV (Obs) 14   MAP (cmH2O) 11 cmH2O   Peak Pressure (cmH2O) 20 cmH2O   I:E Ratio (Obs) 1:2.5   RSBI (f/vt) 37 f/Vt   Heater Temperature (Obs) 95 °F (35 °C)   SPONT Alarms   High Peak Pressure (cmH20) 40 cmH2O   High Resp Rate (BPM) 40 BPM   High MV (L/min) 18 L/min   Low MV (L/min) 4 L/min   High Spont VTE (mL) 1000 mL   Low Spont VTE (mL) 250 mL   Apnea Time (S) 20 S   SPONT Apnea Settings   Resp Rate (BPM) 15 BPM   FIO2 (%) 40 %   %TI (%) 1.3 %   Apnea Time (S) 20 S   Maintenance   Alarm (pink) cable attached No   Resuscitation bag with peep valve at bedside Yes   Water bag changed No   Circuit changed No   Daily Screen   Patient safety screen outcome: Passed   IHI Ventilator Associated Pneumonia  Bundle   Daily Assessment of Readiness to Extubate Yes   ETT  Cuffed 7.5 mm   Placement Date/Time: 02/13/24 c) 6734   Type: Cuffed  Tube Size: 7.5 mm  Location: Oral  Insertion attempts: 1  Placement Verification: End tidal CO2;Symmetrical chest wall movement  Secured at (cm): 23   Secured at (cm) 23   Measured from Lips   Secured Location Left   Repositioned Center to Left   Secured by Commercial tube escudero   Site Condition Dry   Cuff Pressure (color) Green   HI-LO Suction  Intermittent suction   HI-LO Secretions Small;Blood tinged   HI-LO Intervention Patent

## 2024-02-15 NOTE — QUICK NOTE
Asked by ICU team to re-evaluate as patient has nasal blockage from dried blood and likely will need high flow NC after extubation.     Large clot suctioned from left nasal passage. Bilateral nasal cavities cleared. Two irritated blood vessels visualized on anterior septums bilaterally. These areas were cauterized with 1 stick of silver nitrate total. No active bleeding seen.    Continue with care plan outlined in ENT consult note.

## 2024-02-15 NOTE — CONSULTS
Nutrition Note    Nutrition Summary:  Eating well prior to intubation . EN started with Glucerna 1.2. See below for current recommendations         Nutrition Recommendations:   EN recommendations: Glucerna 1.2 at 60 mL/hr.   -Provides: 1440 mL total volume, 1728 kcal ( 2082 kcal including propofol), 86g protein, 1159 mL free water.   2.  Recommend water flushes at 90 mL q 4 hrs . Will defer fluid adjustments in diet order to provider                           Marion Soriano RD

## 2024-02-15 NOTE — SOCIAL WORK
[facility] met with the pt and her family at bedside.  Her , son, two brothers, sister and niece were present.  They describe the pt as a loving mother, wife, sister and Aunt.  She is caring toward everyone.  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  The pt also has three dogs that reportedly miss her dearly.  Carla enjoys the outdoors and watching boats pass on the beach.    She and her  met in 1991.  They moved to [place] from [facility] [place]. They are Yars[place] and the  stopped by the room during the visit.  They have a daughter who will be getting  in July, a son who is in high school and one child that has passed away.  The pt has survived brain surgery and a previous cancer diagnosis.      The pt family remains hopeful she will improve.  They would like her to be well enough to attend their daughters wedding.      The pt  spoke about his experience at another hospital and states they fee very comfortable at the [facility].  The family spoke very highly about the care the staff have provided the client.  Both Doctors as well as nurses were commended for providing excellent service.      I have spent 45 minutes with Patient and family today in which greater than 50% of this time was spent in counseling/coordination of care     Palliative  will follow-up as requested by patient, family, and primary team.  Please contact with any specific requests

## 2024-02-16 ENCOUNTER — APPOINTMENT (INPATIENT)
Dept: GASTROENTEROLOGY | Facility: HOSPITAL | Age: 59
DRG: 003 | End: 2024-02-16
Payer: COMMERCIAL

## 2024-02-16 ENCOUNTER — APPOINTMENT (INPATIENT)
Dept: RADIOLOGY | Facility: HOSPITAL | Age: 59
DRG: 003 | End: 2024-02-16
Payer: COMMERCIAL

## 2024-02-16 LAB
A FUMIGATUS1 AB SER QL ID: NEGATIVE
A PULLULANS AB SER QL ID: NEGATIVE
ALBUMIN SERPL BCP-MCNC: 3.1 G/DL (ref 3.5–5)
ALP SERPL-CCNC: 72 U/L (ref 34–104)
ALT SERPL W P-5'-P-CCNC: 24 U/L (ref 7–52)
ANION GAP SERPL CALCULATED.3IONS-SCNC: 9 MMOL/L
AST SERPL W P-5'-P-CCNC: 17 U/L (ref 13–39)
BACTERIA SPT RESP CULT: NORMAL
BASE EXCESS BLDA CALC-SCNC: -5 MMOL/L
BILIRUB DIRECT SERPL-MCNC: 0.2 MG/DL (ref 0–0.2)
BILIRUB SERPL-MCNC: 0.68 MG/DL (ref 0.2–1)
BUN SERPL-MCNC: 42 MG/DL (ref 5–25)
CALCIUM SERPL-MCNC: 9.7 MG/DL (ref 8.4–10.2)
CHLORIDE SERPL-SCNC: 101 MMOL/L (ref 96–108)
CO2 SERPL-SCNC: 18 MMOL/L (ref 21–32)
CREAT SERPL-MCNC: 1.22 MG/DL (ref 0.6–1.3)
CRP SERPL QL: 35.9 MG/L
ERYTHROCYTE [DISTWIDTH] IN BLOOD BY AUTOMATED COUNT: 17 % (ref 11.6–15.1)
GFR SERPL CREATININE-BSD FRML MDRD: 48 ML/MIN/1.73SQ M
GLUCOSE SERPL-MCNC: 142 MG/DL (ref 65–140)
GLUCOSE SERPL-MCNC: 160 MG/DL (ref 65–140)
GLUCOSE SERPL-MCNC: 164 MG/DL (ref 65–140)
GLUCOSE SERPL-MCNC: 237 MG/DL (ref 65–140)
GLUCOSE SERPL-MCNC: 253 MG/DL (ref 65–140)
GRAM STN SPEC: NORMAL
HCO3 BLDA-SCNC: 20 MMOL/L (ref 22–28)
HCT VFR BLD AUTO: 28.7 % (ref 34.8–46.1)
HGB BLD-MCNC: 10.2 G/DL (ref 11.5–15.4)
LACEYELLA SACCHARI AB SER QL: NEGATIVE
LIPASE SERPL-CCNC: 47 U/L (ref 11–82)
LYMPHOCYTES NFR BLD AUTO: 11 %
MACROPHAGES NFR FLD: 17 %
MAGNESIUM SERPL-MCNC: 2.2 MG/DL (ref 1.9–2.7)
MCH RBC QN AUTO: 31.1 PG (ref 26.8–34.3)
MCHC RBC AUTO-ENTMCNC: 35.5 G/DL (ref 31.4–37.4)
MCV RBC AUTO: 88 FL (ref 82–98)
NEUTS SEG NFR BLD AUTO: 72 %
O2 CT BLDA-SCNC: 17.7 ML/DL (ref 16–23)
OSMOLALITY UR/SERPL-RTO: 302 MMOL/KG (ref 282–298)
OSMOLALITY UR/SERPL-RTO: 303 MMOL/KG (ref 282–298)
OSMOLALITY UR/SERPL-RTO: 311 MMOL/KG (ref 282–298)
OSMOLALITY UR/SERPL-RTO: 320 MMOL/KG (ref 282–298)
OSMOLALITY UR/SERPL-RTO: 324 MMOL/KG (ref 282–298)
OSMOLALITY UR/SERPL-RTO: 328 MMOL/KG (ref 282–298)
OXYHGB MFR BLDA: 96.7 % (ref 94–97)
PCO2 BLDA: 37 MM HG (ref 36–44)
PH BLDA: 7.35 [PH] (ref 7.35–7.45)
PIGEON SERUM AB QL ID: NEGATIVE
PLATELET # BLD AUTO: 236 THOUSANDS/UL (ref 149–390)
PMV BLD AUTO: 11 FL (ref 8.9–12.7)
PO2 BLDA: 119.3 MM HG (ref 75–129)
POTASSIUM SERPL-SCNC: 4.8 MMOL/L (ref 3.5–5.3)
PROT SERPL-MCNC: 8.3 G/DL (ref 6.4–8.4)
RBC # BLD AUTO: 3.28 MILLION/UL (ref 3.81–5.12)
S RECTIVIRGULA AB SER QL ID: NEGATIVE
SODIUM SERPL-SCNC: 128 MMOL/L (ref 135–147)
SPECIMEN SOURCE: ABNORMAL
T VULGARIS AB SER QL ID: NEGATIVE
TOTAL CELLS COUNTED SPEC: 100
TRIGL SERPL-MCNC: 684 MG/DL
WBC # BLD AUTO: 20.62 THOUSAND/UL (ref 4.31–10.16)

## 2024-02-16 PROCEDURE — 94760 N-INVAS EAR/PLS OXIMETRY 1: CPT

## 2024-02-16 PROCEDURE — 88185 FLOWCYTOMETRY/TC ADD-ON: CPT | Performed by: STUDENT IN AN ORGANIZED HEALTH CARE EDUCATION/TRAINING PROGRAM

## 2024-02-16 PROCEDURE — 87070 CULTURE OTHR SPECIMN AEROBIC: CPT | Performed by: STUDENT IN AN ORGANIZED HEALTH CARE EDUCATION/TRAINING PROGRAM

## 2024-02-16 PROCEDURE — 87798 DETECT AGENT NOS DNA AMP: CPT | Performed by: STUDENT IN AN ORGANIZED HEALTH CARE EDUCATION/TRAINING PROGRAM

## 2024-02-16 PROCEDURE — 87635 SARS-COV-2 COVID-19 AMP PRB: CPT | Performed by: STUDENT IN AN ORGANIZED HEALTH CARE EDUCATION/TRAINING PROGRAM

## 2024-02-16 PROCEDURE — 85027 COMPLETE CBC AUTOMATED: CPT | Performed by: STUDENT IN AN ORGANIZED HEALTH CARE EDUCATION/TRAINING PROGRAM

## 2024-02-16 PROCEDURE — 0B988ZX DRAINAGE OF LEFT UPPER LOBE BRONCHUS, VIA NATURAL OR ARTIFICIAL OPENING ENDOSCOPIC, DIAGNOSTIC: ICD-10-PCS | Performed by: INTERNAL MEDICINE

## 2024-02-16 PROCEDURE — 99291 CRITICAL CARE FIRST HOUR: CPT | Performed by: INTERNAL MEDICINE

## 2024-02-16 PROCEDURE — 87205 SMEAR GRAM STAIN: CPT | Performed by: STUDENT IN AN ORGANIZED HEALTH CARE EDUCATION/TRAINING PROGRAM

## 2024-02-16 PROCEDURE — 87116 MYCOBACTERIA CULTURE: CPT | Performed by: STUDENT IN AN ORGANIZED HEALTH CARE EDUCATION/TRAINING PROGRAM

## 2024-02-16 PROCEDURE — 0B958ZX DRAINAGE OF RIGHT MIDDLE LOBE BRONCHUS, VIA NATURAL OR ARTIFICIAL OPENING ENDOSCOPIC, DIAGNOSTIC: ICD-10-PCS | Performed by: INTERNAL MEDICINE

## 2024-02-16 PROCEDURE — 89051 BODY FLUID CELL COUNT: CPT | Performed by: STUDENT IN AN ORGANIZED HEALTH CARE EDUCATION/TRAINING PROGRAM

## 2024-02-16 PROCEDURE — 86140 C-REACTIVE PROTEIN: CPT

## 2024-02-16 PROCEDURE — 71045 X-RAY EXAM CHEST 1 VIEW: CPT

## 2024-02-16 PROCEDURE — 99233 SBSQ HOSP IP/OBS HIGH 50: CPT | Performed by: INTERNAL MEDICINE

## 2024-02-16 PROCEDURE — C9113 INJ PANTOPRAZOLE SODIUM, VIA: HCPCS

## 2024-02-16 PROCEDURE — 80076 HEPATIC FUNCTION PANEL: CPT | Performed by: STUDENT IN AN ORGANIZED HEALTH CARE EDUCATION/TRAINING PROGRAM

## 2024-02-16 PROCEDURE — 87106 FUNGI IDENTIFICATION YEAST: CPT | Performed by: STUDENT IN AN ORGANIZED HEALTH CARE EDUCATION/TRAINING PROGRAM

## 2024-02-16 PROCEDURE — 87206 SMEAR FLUORESCENT/ACID STAI: CPT | Performed by: STUDENT IN AN ORGANIZED HEALTH CARE EDUCATION/TRAINING PROGRAM

## 2024-02-16 PROCEDURE — 87102 FUNGUS ISOLATION CULTURE: CPT | Performed by: STUDENT IN AN ORGANIZED HEALTH CARE EDUCATION/TRAINING PROGRAM

## 2024-02-16 PROCEDURE — 82948 REAGENT STRIP/BLOOD GLUCOSE: CPT

## 2024-02-16 PROCEDURE — 0BD28ZX EXTRACTION OF CARINA, VIA NATURAL OR ARTIFICIAL OPENING ENDOSCOPIC, DIAGNOSTIC: ICD-10-PCS | Performed by: INTERNAL MEDICINE

## 2024-02-16 PROCEDURE — 88184 FLOWCYTOMETRY/ TC 1 MARKER: CPT | Performed by: STUDENT IN AN ORGANIZED HEALTH CARE EDUCATION/TRAINING PROGRAM

## 2024-02-16 PROCEDURE — 94003 VENT MGMT INPAT SUBQ DAY: CPT

## 2024-02-16 PROCEDURE — 31624 DX BRONCHOSCOPE/LAVAGE: CPT | Performed by: INTERNAL MEDICINE

## 2024-02-16 PROCEDURE — 82805 BLOOD GASES W/O2 SATURATION: CPT | Performed by: STUDENT IN AN ORGANIZED HEALTH CARE EDUCATION/TRAINING PROGRAM

## 2024-02-16 PROCEDURE — 83690 ASSAY OF LIPASE: CPT | Performed by: STUDENT IN AN ORGANIZED HEALTH CARE EDUCATION/TRAINING PROGRAM

## 2024-02-16 PROCEDURE — 80048 BASIC METABOLIC PNL TOTAL CA: CPT | Performed by: STUDENT IN AN ORGANIZED HEALTH CARE EDUCATION/TRAINING PROGRAM

## 2024-02-16 PROCEDURE — 87252 VIRUS INOCULATION TISSUE: CPT | Performed by: STUDENT IN AN ORGANIZED HEALTH CARE EDUCATION/TRAINING PROGRAM

## 2024-02-16 PROCEDURE — 84478 ASSAY OF TRIGLYCERIDES: CPT

## 2024-02-16 PROCEDURE — 94664 DEMO&/EVAL PT USE INHALER: CPT

## 2024-02-16 PROCEDURE — 87305 ASPERGILLUS AG IA: CPT | Performed by: STUDENT IN AN ORGANIZED HEALTH CARE EDUCATION/TRAINING PROGRAM

## 2024-02-16 PROCEDURE — 99232 SBSQ HOSP IP/OBS MODERATE 35: CPT | Performed by: INTERNAL MEDICINE

## 2024-02-16 PROCEDURE — 83930 ASSAY OF BLOOD OSMOLALITY: CPT | Performed by: STUDENT IN AN ORGANIZED HEALTH CARE EDUCATION/TRAINING PROGRAM

## 2024-02-16 PROCEDURE — 83735 ASSAY OF MAGNESIUM: CPT | Performed by: STUDENT IN AN ORGANIZED HEALTH CARE EDUCATION/TRAINING PROGRAM

## 2024-02-16 RX ORDER — METHYLPREDNISOLONE SODIUM SUCCINATE 125 MG/2ML
125 INJECTION, POWDER, LYOPHILIZED, FOR SOLUTION INTRAMUSCULAR; INTRAVENOUS EVERY 12 HOURS SCHEDULED
Status: DISCONTINUED | OUTPATIENT
Start: 2024-02-16 | End: 2024-02-16

## 2024-02-16 RX ORDER — DEXMEDETOMIDINE HYDROCHLORIDE 4 UG/ML
.1-1.2 INJECTION, SOLUTION INTRAVENOUS
Status: DISCONTINUED | OUTPATIENT
Start: 2024-02-16 | End: 2024-03-01

## 2024-02-16 RX ORDER — FAMOTIDINE 40 MG/5ML
20 POWDER, FOR SUSPENSION ORAL 2 TIMES DAILY
Status: DISCONTINUED | OUTPATIENT
Start: 2024-02-16 | End: 2024-02-20

## 2024-02-16 RX ORDER — HYDROMORPHONE HYDROCHLORIDE 2 MG/ML
2 INJECTION, SOLUTION INTRAMUSCULAR; INTRAVENOUS; SUBCUTANEOUS ONCE
Status: COMPLETED | OUTPATIENT
Start: 2024-02-16 | End: 2024-02-16

## 2024-02-16 RX ORDER — MIDAZOLAM HYDROCHLORIDE 2 MG/2ML
6 INJECTION, SOLUTION INTRAMUSCULAR; INTRAVENOUS ONCE
Status: COMPLETED | OUTPATIENT
Start: 2024-02-16 | End: 2024-02-16

## 2024-02-16 RX ORDER — METHYLPREDNISOLONE SODIUM SUCCINATE 125 MG/2ML
60 INJECTION, POWDER, LYOPHILIZED, FOR SOLUTION INTRAMUSCULAR; INTRAVENOUS EVERY 12 HOURS SCHEDULED
Status: DISCONTINUED | OUTPATIENT
Start: 2024-02-16 | End: 2024-02-17

## 2024-02-16 RX ORDER — LORAZEPAM 2 MG/ML
0.5 INJECTION INTRAMUSCULAR EVERY 6 HOURS PRN
Status: DISCONTINUED | OUTPATIENT
Start: 2024-02-16 | End: 2024-02-21

## 2024-02-16 RX ORDER — LIDOCAINE HYDROCHLORIDE 10 MG/ML
INJECTION, SOLUTION EPIDURAL; INFILTRATION; INTRACAUDAL; PERINEURAL
Status: DISPENSED
Start: 2024-02-16 | End: 2024-02-17

## 2024-02-16 RX ORDER — FUROSEMIDE 10 MG/ML
40 INJECTION INTRAMUSCULAR; INTRAVENOUS ONCE
Status: COMPLETED | OUTPATIENT
Start: 2024-02-16 | End: 2024-02-16

## 2024-02-16 RX ADMIN — CHLORHEXIDINE GLUCONATE 0.12% ORAL RINSE 15 ML: 1.2 LIQUID ORAL at 08:28

## 2024-02-16 RX ADMIN — Medication 1 SPRAY: at 20:29

## 2024-02-16 RX ADMIN — Medication 1 APPLICATION: at 08:29

## 2024-02-16 RX ADMIN — Medication 1 APPLICATION: at 02:52

## 2024-02-16 RX ADMIN — Medication 1 SPRAY: at 10:00

## 2024-02-16 RX ADMIN — FAMOTIDINE 20 MG: 40 POWDER, FOR SUSPENSION ORAL at 17:25

## 2024-02-16 RX ADMIN — Medication 1 APPLICATION: at 06:10

## 2024-02-16 RX ADMIN — INSULIN LISPRO 1 UNITS: 100 INJECTION, SOLUTION INTRAVENOUS; SUBCUTANEOUS at 06:09

## 2024-02-16 RX ADMIN — Medication 1 APPLICATION: at 22:02

## 2024-02-16 RX ADMIN — Medication 1 APPLICATION: at 20:29

## 2024-02-16 RX ADMIN — PANTOPRAZOLE SODIUM 40 MG: 40 INJECTION, POWDER, FOR SOLUTION INTRAVENOUS at 08:20

## 2024-02-16 RX ADMIN — Medication 1 APPLICATION: at 16:00

## 2024-02-16 RX ADMIN — DEXMEDETOMIDINE HYDROCHLORIDE 0.2 MCG/KG/HR: 4 INJECTION, SOLUTION INTRAVENOUS at 11:12

## 2024-02-16 RX ADMIN — SULFAMETHOXAZOLE AND TRIMETHOPRIM 2.5 TABLET: 800; 160 TABLET ORAL at 17:00

## 2024-02-16 RX ADMIN — Medication 1 SPRAY: at 06:10

## 2024-02-16 RX ADMIN — SULFAMETHOXAZOLE AND TRIMETHOPRIM 2.5 TABLET: 800; 160 TABLET ORAL at 20:30

## 2024-02-16 RX ADMIN — Medication 1 APPLICATION: at 10:00

## 2024-02-16 RX ADMIN — SULFAMETHOXAZOLE AND TRIMETHOPRIM 2.5 TABLET: 800; 160 TABLET ORAL at 08:11

## 2024-02-16 RX ADMIN — INSULIN LISPRO 2 UNITS: 100 INJECTION, SOLUTION INTRAVENOUS; SUBCUTANEOUS at 11:59

## 2024-02-16 RX ADMIN — Medication 1 APPLICATION: at 17:22

## 2024-02-16 RX ADMIN — PROPOFOL 30 MCG/KG/MIN: 10 INJECTION, EMULSION INTRAVENOUS at 00:51

## 2024-02-16 RX ADMIN — NYSTATIN: 100000 POWDER TOPICAL at 08:29

## 2024-02-16 RX ADMIN — QUETIAPINE FUMARATE 12.5 MG: 25 TABLET ORAL at 22:02

## 2024-02-16 RX ADMIN — Medication 1 SPRAY: at 11:27

## 2024-02-16 RX ADMIN — METHYLPREDNISOLONE SODIUM SUCCINATE 60 MG: 125 INJECTION, POWDER, FOR SOLUTION INTRAMUSCULAR; INTRAVENOUS at 08:21

## 2024-02-16 RX ADMIN — INSULIN LISPRO 2 UNITS: 100 INJECTION, SOLUTION INTRAVENOUS; SUBCUTANEOUS at 18:01

## 2024-02-16 RX ADMIN — FAMOTIDINE 20 MG: 40 POWDER, FOR SUSPENSION ORAL at 08:10

## 2024-02-16 RX ADMIN — Medication 1 SPRAY: at 16:00

## 2024-02-16 RX ADMIN — NYSTATIN: 100000 POWDER TOPICAL at 17:26

## 2024-02-16 RX ADMIN — VENLAFAXINE 25 MG: 25 TABLET ORAL at 17:24

## 2024-02-16 RX ADMIN — Medication 1 SPRAY: at 17:22

## 2024-02-16 RX ADMIN — Medication 1 SPRAY: at 02:52

## 2024-02-16 RX ADMIN — Medication 1 APPLICATION: at 04:31

## 2024-02-16 RX ADMIN — Medication 1 SPRAY: at 04:31

## 2024-02-16 RX ADMIN — LAMOTRIGINE 150 MG: 100 TABLET ORAL at 08:16

## 2024-02-16 RX ADMIN — HYDROMORPHONE HYDROCHLORIDE 2 MG: 2 INJECTION, SOLUTION INTRAMUSCULAR; INTRAVENOUS; SUBCUTANEOUS at 11:30

## 2024-02-16 RX ADMIN — ENOXAPARIN SODIUM 40 MG: 40 INJECTION SUBCUTANEOUS at 08:10

## 2024-02-16 RX ADMIN — Medication 1 SPRAY: at 13:53

## 2024-02-16 RX ADMIN — FUROSEMIDE 40 MG: 10 INJECTION, SOLUTION INTRAMUSCULAR; INTRAVENOUS at 11:07

## 2024-02-16 RX ADMIN — PROPOFOL 30 MCG/KG/MIN: 10 INJECTION, EMULSION INTRAVENOUS at 06:10

## 2024-02-16 RX ADMIN — Medication 1 APPLICATION: at 13:53

## 2024-02-16 RX ADMIN — VENLAFAXINE 25 MG: 25 TABLET ORAL at 11:29

## 2024-02-16 RX ADMIN — HYDROMORPHONE HYDROCHLORIDE 0.5 MG: 1 INJECTION, SOLUTION INTRAMUSCULAR; INTRAVENOUS; SUBCUTANEOUS at 03:04

## 2024-02-16 RX ADMIN — POLYETHYLENE GLYCOL 3350 17 G: 17 POWDER, FOR SOLUTION ORAL at 08:19

## 2024-02-16 RX ADMIN — TOPIRAMATE 100 MG: 100 TABLET, FILM COATED ORAL at 08:17

## 2024-02-16 RX ADMIN — VENLAFAXINE 25 MG: 25 TABLET ORAL at 08:15

## 2024-02-16 RX ADMIN — HYDROMORPHONE HYDROCHLORIDE 0.5 MG: 1 INJECTION, SOLUTION INTRAMUSCULAR; INTRAVENOUS; SUBCUTANEOUS at 07:41

## 2024-02-16 RX ADMIN — CHLORHEXIDINE GLUCONATE 0.12% ORAL RINSE 15 ML: 1.2 LIQUID ORAL at 20:29

## 2024-02-16 RX ADMIN — Medication 1 SPRAY: at 08:29

## 2024-02-16 RX ADMIN — MIDAZOLAM 6 MG: 1 INJECTION INTRAMUSCULAR; INTRAVENOUS at 11:30

## 2024-02-16 RX ADMIN — HYDROMORPHONE HYDROCHLORIDE 0.5 MG/HR: 10 INJECTION, SOLUTION INTRAMUSCULAR; INTRAVENOUS; SUBCUTANEOUS at 11:20

## 2024-02-16 RX ADMIN — Medication 1 APPLICATION: at 11:28

## 2024-02-16 RX ADMIN — TOPIRAMATE 100 MG: 100 TABLET, FILM COATED ORAL at 17:25

## 2024-02-16 RX ADMIN — METHYLPREDNISOLONE SODIUM SUCCINATE 60 MG: 125 INJECTION, POWDER, FOR SOLUTION INTRAMUSCULAR; INTRAVENOUS at 20:29

## 2024-02-16 RX ADMIN — Medication 1 SPRAY: at 22:01

## 2024-02-16 RX ADMIN — LAMOTRIGINE 150 MG: 100 TABLET ORAL at 17:52

## 2024-02-16 NOTE — UTILIZATION REVIEW
"Continued Stay Review    Date: 02/16                          Current Patient Class: IP  Current Level of Care: Level 2 stepdown/HOT    HPI:59 y.o. female initially admitted on 01/10     Assessment/Plan: Pt's FiO2 increased to 70%, CRP elevated this morning.  Appears to related to steroid taper more so than IVIG since IVIG was just stopped yesterday. Will increase IV steroids. Cont MV support. Received 3 doses of dilaudid in the past 24 hrs. Concerned for propofol related infusion syndrome, may consider holding propofol and consider using an alternative like ketamine drip or perhaps dilaudid drip. Na 28, hold freq water flushes.cpnt Bactrim. Cont TF. ID [id number].    Vital Signs: BP 90/50   Pulse 92   Temp 99.9 °F (37.7 °C)   Resp 12   Ht 5' 8\" (1.727 m)   Wt 73.2 kg (161 lb 6 oz)   SpO2 (!) 89%   BMI 24.54 kg/m²       Pertinent Labs/Diagnostic Results:       Results from last 7 days   Lab Units 02/16/24  0432 02/15/24  0448 02/14/24  0552 02/13/24  0552 02/12/24  0447   WBC Thousand/uL 20.62* 7.77 9.36 7.29 9.11   HEMOGLOBIN g/dL 10.2* 9.5* 10.0* 10.1* 10.5*   HEMATOCRIT % 28.7* 29.4* 30.2* 31.3* 32.5*   PLATELETS Thousands/uL 236 210 202 173 186   BANDS PCT %  --   --   --  3  --          Results from last 7 days   Lab Units 02/16/24  0432 02/15/24  0448 02/14/24  0552 02/13/24  0552 02/12/24  0447 02/11/24  0450   SODIUM mmol/L 128* 129* 133* 135 137 139   POTASSIUM mmol/L 4.8 5.1 5.2 5.0 4.0 4.2   CHLORIDE mmol/L 101 104 104 106 105 105   CO2 mmol/L 18* 21 22 23 23 26   ANION GAP mmol/L 9 4 7 6 9 8   BUN mg/dL 42* 38* 33* 31* 31* 31*   CREATININE mg/dL 1.22 1.15 0.99 0.95 0.91 0.88   EGFR ml/min/1.73sq m 48 52 63 66 69 72   CALCIUM mg/dL 9.7 9.5 9.6 9.1 9.6 9.5   MAGNESIUM mg/dL 2.2 2.4 2.2 2.1 2.0 2.1   PHOSPHORUS mg/dL  --   --   --   --  3.3 3.1     Results from last 7 days   Lab Units 02/12/24  0447   AST U/L 22   ALT U/L 35   ALK PHOS U/L 72   TOTAL PROTEIN g/dL 5.9*   ALBUMIN g/dL 2.9*   TOTAL BILIRUBIN " "mg/dL 0.29     Results from last 7 days   Lab Units 02/16/24  1135 02/16/24  0755 02/16/24  0559 02/15/24  2313 02/15/24  1726 02/15/24  1157 02/15/24  0515 02/15/24  0046 02/14/24  1855 02/14/24  1157 02/14/24  0026 02/13/24  1707   POC GLUCOSE mg/dl 253* 164* 160* 91 112 96 118 134 123 94 110 125     Results from last 7 days   Lab Units 02/16/24  0432 02/15/24  0448 02/14/24  0552 02/13/24  0552 02/12/24  0447 02/11/24  0450 02/10/24  0554   GLUCOSE RANDOM mg/dL 142* 113 119 160* 95 135 176*     Results from last 7 days   Lab Units 02/16/24  1207 02/16/24  0801 02/16/24  0432 02/15/24  2311 02/15/24  1932 02/15/24  1544 02/15/24  0752   OSMOLALITY, SERUM mmol/* 311* 303* 302* 302* 304* 301*         No results found for: \"BETA-HYDROXYBUTYRATE\"   Results from last 7 days   Lab Units 02/16/24  0756 02/15/24  0448 02/14/24  0556   PH ART  7.351 7.401 7.382   PCO2 ART mm Hg 37.0 34.7* 38.5   PO2 ART mm Hg 119.3 95.5 107.8   HCO3 ART mmol/L 20.0* 21.1* 22.4   BASE EXC ART mmol/L -5.0 -3.1 -2.4   O2 CONTENT ART mL/dL 17.7 15.6* 15.0*   O2 HGB, ARTERIAL % 96.7 95.9 96.9   ABG SOURCE  Line, Arterial Line, Arterial Line, Arterial       Results from last 7 days   Lab Units 02/11/24  0450 02/09/24  1717   BNP pg/mL 36 75                     Results from last 7 days   Lab Units 02/16/24  0432   LIPASE u/L 47     Results from last 7 days   Lab Units 02/16/24  0432 02/15/24  0448 02/14/24  0552 02/12/24  0447 02/11/24  0450   CRP mg/L 35.9* 8.3* 15.8* 21.0* 31.6*         Results from last 7 days   Lab Units 02/16/24  1207 02/16/24  0801 02/16/24  0432 02/15/24  2311 02/15/24  1932 02/15/24  1544 02/15/24  0752   OSMOLALITY, SERUM mmol/* 311* 303* 302* 302* 304* 301*   OSMO UR mmol/KG  --   --   --   --   --   --  475     Results from last 7 days   Lab Units 02/15/24  0752   SODIUM UR  57           Results from last 7 days   Lab Units 02/14/24  1340   SPUTUM CULTURE  1+ Growth of   GRAM STAIN RESULT  2+ Polys  1+ " Epithelial Cells  No bacteria seen     Results from last 7 days   Lab Units 02/16/24  1209   TOTAL COUNTED  100               Medications:   Scheduled Medications:  chlorhexidine, 15 mL, Mouth/Throat, Q12H SARTHAK  enoxaparin, 40 mg, Subcutaneous, Daily  famotidine, 20 mg, Oral, BID  insulin lispro, 1-5 Units, Subcutaneous, Q6H SARTHAK  lamoTRIgine, 150 mg, Oral, BID  methylPREDNISolone sodium succinate, 60 mg, Intravenous, Q12H SARTHAK  nystatin, , Topical, BID  polyethylene glycol, 17 g, Oral, Daily  QUEtiapine, 12.5 mg, Oral, HS  sodium chloride, 1 Application, Nasal, Q2H  sodium chloride, 1 spray, Each Nare, Q2H  sulfamethoxazole-trimethoprim, 5 mg/kg of trimethoprim, Oral, TID  topiramate, 100 mg, Oral, BID  venlafaxine, 25 mg, Per NG Tube, TID With Meals      Continuous IV Infusions:  dexmedetomidine, 0.1-0.7 mcg/kg/hr, Intravenous, Titrated  HYDROmorphone, 0.5 mg/hr, Intravenous, Continuous  propofol (DIPRIVAN) 1000 mg in 100 mL infusion (premix)  Rate: 2.2-44.7 mL/hr Dose: 5-100 mcg/kg/min  Weight Dosing Info: 74.5 kg  Freq: Titrated Route: IV  Last Dose: Stopped (02/16/24 0830)  Start: 02/13/24 1800 End: 02/16/24 0924     PRN Meds:  acetaminophen, 650 mg, Oral, Q6H PRN  bisacodyl, 10 mg, Rectal, Daily PRN  calcium carbonate, 500 mg, Oral, BID PRN  HYDROmorphone, 0.5 mg, Intravenous, Q1H PRN 02/16 x 2  LORazepam, 0.5 mg, Intravenous, Q6H PRN  metoprolol, 2.5 mg, Intravenous, Q6H PRN  ondansetron, 4 mg, Intravenous, Q6H PRN  senna-docusate sodium, 2 tablet, Oral, BID PRN        Discharge Plan: D    Network Utilization Review Department  ATTENTION: Please call with any questions or concerns to [phone] and carefully listen to the prompts so that you are directed to the right person. All voicemails are confidential.   For Discharge needs, contact Care Management DC Support Team at [phone] opt. 2  Send all requests for admission clinical reviews, approved or denied determinations and any other requests to  dedicated fax number below belonging to the campus where the patient is receiving treatment. List of dedicated fax numbers for the Facilities:  FACILITY NAME UR FAX NUMBER   ADMISSION DENIALS (Administrative/Medical Necessity) [phone]   DISCHARGE SUPPORT TEAM (NETWORK) [phone]   PARENT CHILD HEALTH (Maternity/NICU/Pediatrics) [phone]   [facility] [phone]   [facility] [phone]   [facility] [phone]   [facility] [phone]   [facility] [phone]   [facility] [phone]   [facility] [phone]   [facility] [phone]   [facility] [phone]   [facility] [phone]   General acute hospital [phone]   [facility] [phone]

## 2024-02-16 NOTE — PROGRESS NOTES
Pastoral Care Progress Note    2024  Patient: Carla Hunt : 1965  Admission Date & Time: 1/10/2024 1941  MRN: [id number] CSN: [id number]          Pt received a pastoral visit from Fr. Daugherty. She declined anointing.

## 2024-02-16 NOTE — PROGRESS NOTES
24 1600   Clinical Encounter Type   Visited With Patient and family together   Routine Visit Follow-up   Pentecostalism Encounters   Pentecostalism Needs Prayer   Sacramental Encounters   Sacrament of Sick-Anointing Anointed      Pastoral Care Progress Note    2024  Patient: Carla Hunt : 1965  Admission Date & Time: 1/10/2024 1941  MRN: [id number] CSN: [id number]          Fr. Daugherty visit .

## 2024-02-16 NOTE — PROGRESS NOTES
[facility]  Progress Note: Critical Care  Name: Carla Hunt 59 y.o. 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  -Received three doses of dilaudid in past 24 hours  -Concerning for propofol related infusion syndrome, may consider holding propofol and consider using an alternative like ketamine drip or perhaps dilaudid drip  -Lipase and hepatic function panel added to assess this further    Diagnosis: seizure disorder  -S/p partial left temporal lobe resection in 2007  -Contuinue home lamictal 150 bid and topamax 100 bid     Diagnosis: Anxiety  -On venlafaxine 25 mg TID via NGT  -Seroquel 12.5 hs and melatonin 6 hs for sleep     CV:   No active issues     Pulm:  Diagnosis: Acute hypoxic respiratory failure due to severe covid  and covid induced fibrosis  -Tested COVID positive on 1/7  -Completed severe COVID pathway and 7 days CTX  -Tenuous respiratory status requiring multiple re-admissions to MICU  -S/p Bronch 2/2   -NG on cultures (initially showed non-group A strep)  -PCP and AFB negative   -Legionella, viral, fungal cultures no growth to date  -Pulse dose steroids with solumedrol 1g daily x3 days (completed 2/7) then transitioned to prednisone 80mg daily on 2/8  -Vent settings: APVcmv 14/400/8/60%  -Currently on solumedrol 60 mg Q12H  -CRP trend 21.0 (02/12)>15.8 (02/14) > 8.3 (02/15) > 35.9 (02/16) . Appears to related to steroid taper more so than IVIG since IVIG was just stopped yesterday. 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) though unclear how this was diagnosed, this also does not enirely fit as she is NOT hypokalemic  -Continue to monitor I/O and UOP     Diagnosis: Hyponatremia  -Noted to have sodium 133>129>128 today  -Etiology: Could be SIADH in setting of infection, related to SSRI use or possibly related to TMP-SMX which is rare but possible  -Urine osmolality, urine sodium and osmolality reviewed  -Osmolality serum- 302 (elevated)  -Will continue to monitor this     F/E/N:   F: none, boluses as indicated   E: monitor and replete for goal K>4, P>3, Mg>2  N: Tube feeds      Heme/Onc:   Diagnosis: B cell lymphoma  -[facility]s with heme[facility]c at [facility]  -On rituxan for 2 year course, started May 2022  -Last dose was 12/20, treatment currently on hold   -Leukopenic on admission but WBC now wnl     DVT ppx with lovenox      Endo:   Diagnosis: steroid hyperglycemia and diabetes mellitus type 2, A1c at 6.6 from 01/2024  -SSI, hypoglycemic protocol  -On SSI  -On tube feeds, will add scheduled lispro if needed  -Goal -180     ID:   Diagnosis: Severe covid pneumonia and stenotrophomonas pneumonia  -Completed severe COVID pathway with decadron (ended 1/22) and remdesivir (ended 1/20), also completed 7 days CXT on 1/15  -C/f opportunistic infections given immunocompromised status on chemotherapy and recent steroid use  -No consolidations on CXR and procal negative  -S/p bactrim and minocycline x5 days for stenotrophomonas on sputum culture (1/25), then on bactrim for PJP ppx  -Bronc on 2/2 - Cx w/o growth (initially resulted as 1+ alpha hemolytic strep), AFB & PCP negative, f/u fungal, legionella, and viral cultures   -2/10 pt again  had escalating O2 requirements on floors necessitating readmission to ICU  -Concern that with recent pulse dose steroids and now worsening respiratory status she could have infection, possibly opportunistic   -Repeat sputum culture 2/9 with 4+ stenotrophomonas  -On high dose bactrim, day 7 today. Plan to treat for a 14 day course.         MSK/Skin:   -Wound care team following for bilateral sacral wounds       Disposition: Critical care    ICU Core Measures     Vented Patient  VAP Bundle  VAP bundle ordered     A: Assess, Prevent, and Manage Pain Has pain been assessed? Yes  Need for changes to pain regimen? No   B: Both Spontaneous Awakening Trials (SATs) and Spontaneous Breathing Trials (SBTs) Plan to perform spontaneous awakening trial today? Yes   Plan to perform spontaneous breathing trial today? Yes   Obvious barriers to extubation? Yes   C: Choice of Sedation RASS Goal: 0 Alert and Calm  Need for changes to sedation or analgesia regimen? Yes   D: Delirium CAM-ICU: Negative   E: Early Mobility  Plan for early mobility? Yes   F: Family Engagement Plan for family engagement today? Yes       Antibiotic Review: Patient on appropriate coverage based on culture data.     Review of Invasive Devices:    [place] Plan: Continue for accurate I/O monitoring for 48 hours    [place] Plan: Keep arterial line for hemodynamic monitoring and frequent ABGs    Prophylaxis:  VTE VTE covered by:  enoxaparin, Subcutaneous, 40 mg at 02/15/24 0929       Stress Ulcer  covered byfamotidine (PEPCID) oral suspension 20 mg [[id number]], pantoprazole (PROTONIX) injection 40 mg [[id number]]        Significant 24hr Events     24hr events: FiO2 increased to 70%, CRP elevated this morning.      Subjective   Patient seen by bedside. Plan to discuss medical updates and medical treatment plan with patients family today.     Review of Systems   Unable to perform ROS: Patient nonverbal        Objective                            Vitals I/O      Most  Recent Min/Max in 24hrs   Temp 100 °F (37.8 °C) Temp  Min: 99.1 °F (37.3 °C)  Max: 100 °F (37.8 °C)   Pulse 103 Pulse  Min: 82  Max: 103   Resp 17 Resp  Min: 16  Max: 23   BP 90/50 No data recorded   O2 Sat 91 % SpO2  Min: 90 %  Max: 95 %   -On propofol drip 30  -APVcmv 14/400/8/60%   Intake/Output Summary (Last 24 hours) at 2/16/2024 0701  Last data filed at 2/16/2024 0610  Gross per 24 hour   Intake 910.74 ml   Output 1780 ml   Net -869.26 ml       Diet Enteral/Parenteral; Tube Feeding No Oral Diet; Glucerna 1.2; Continuous; 60; 50; Every 6 hours    Invasive Monitoring   Arterial Line  [place] /54  Arterial Line BP  Min: 102/53  Max: 135/57   MAP 70 mmHg  Arterial Line MAP (mmHg)  Min: 67 mmHg  Max: 84 mmHg           Physical Exam   Physical Exam  Vitals reviewed.   Skin:     General: Skin is warm.      Capillary Refill: Capillary refill takes less than 2 seconds.   HENT:      Head: Normocephalic.      Mouth/Throat:      Mouth: Mucous membranes are moist.   Cardiovascular:      Rate and Rhythm: Normal rate and regular rhythm.      Pulses: Normal pulses.   Abdominal: General: Bowel sounds are normal.      Palpations: Abdomen is soft.   Constitutional:       Appearance: She is ill-appearing.      Interventions: She is sedated.      Comments: Has an arterial line, marie, ETT, NGT, 3PIVs.   Pulmonary:      Effort: Pulmonary effort is normal.      Comments: Decreased breath sounds in right lower lung field more so than left.   Neurological:      Mental Status: She is calm.      Comments: Able to follow commands, opens eyes to verbal stimuli. Able to move extremities spontaneously.             Diagnostic Studies      EKG: Reviewed  Imaging: Reviewed I have personally reviewed pertinent reports.       Medications:  Scheduled PRN   chlorhexidine, 15 mL, Q12H SARTHAK  enoxaparin, 40 mg, Daily  famotidine, 20 mg, Daily  insulin lispro, 1-5 Units, Q6H SARTHAK  lamoTRIgine, 150 mg, BID  methylPREDNISolone sodium succinate, 60 mg,  Q12H SARTHAK  nystatin, , BID  pantoprazole, 40 mg, Q24H SARTHAK  polyethylene glycol, 17 g, Daily  QUEtiapine, 12.5 mg, HS  sodium chloride, 1 Application, Q2H  sodium chloride, 1 spray, Q2H  sulfamethoxazole-trimethoprim, 5 mg/kg of trimethoprim, TID  topiramate, 100 mg, BID  venlafaxine, 25 mg, TID With Meals      acetaminophen, 650 mg, Q6H PRN  bisacodyl, 10 mg, Daily PRN  calcium carbonate, 500 mg, BID PRN  HYDROmorphone, 0.5 mg, Q1H PRN  metoprolol, 2.5 mg, Q6H PRN  ondansetron, 4 mg, Q6H PRN  oxymetazoline, 6 spray, BID PRN  senna-docusate sodium, 2 tablet, BID PRN       Continuous    propofol, 5-100 mcg/kg/min, Last Rate: 30 mcg/kg/min (02/16/24 0610)         Labs:    CBC    Recent Labs     02/15/24  0448 02/16/24  0432   WBC 7.77 20.62*   HGB 9.5* 10.2*   HCT 29.4* 28.7*    236     BMP    Recent Labs     02/15/24  0448 02/16/24  0432   SODIUM 129* 128*   K 5.1 4.8    101   CO2 21 18*   AGAP 4 9   BUN 38* 42*   CREATININE 1.15 1.22   CALCIUM 9.5 9.7       Coags    No recent results     Additional Electrolytes  Recent Labs     02/15/24  0448 02/16/24  0432   MG 2.4 2.2          Blood Gas    Recent Labs     02/16/24  0756   PHART 7.351   APZ3ENN 37.0   PO2ART 119.3   [id number] 20.0*   BEART -5.0   SOURCE Line, Arterial     Recent Labs     02/15/24  0448   SOURCE Line, Arterial    LFTs  No recent results    Infectious  No recent results  Glucose  Recent Labs     02/15/24  0448 02/16/24  0432   GLUC 113 142*             Miguel Whittaker MD

## 2024-02-16 NOTE — OCCUPATIONAL THERAPY NOTE
Occupational Therapy Cancel Note         Patient Name: Carla Hunt  Today's Date: 2/16/2024 02/16/24 0828   OT Last Visit   OT Visit Date 02/16/24   Note Type   Note type Cancelled Session   Cancel Reasons Intubated/sedated       OT orders received. Chart reviewed. Pt currently intubated/sedated. Will continue to follow and see pt as appropriate and able.     Tanika Gutierrez MS, OTR/L

## 2024-02-16 NOTE — PROGRESS NOTES
Spiritual Care Progress Note    2024  Patient: Carla Hunt : 1965  Admission Date & Time: 1/10/2024 1941  MRN: [id number] CSN: [id number]      Fr Daugherty met with the pt and pt's family and provided prayers and blessings. No further needs were expressed at this time.    Chaplains still remain available.       24 1100   Clinical Encounter Type   Visited With Patient and family together   Samaritan Encounters   Samaritan Needs Prayer

## 2024-02-16 NOTE — PROGRESS NOTES
[facility]  Progress Note  Name: Carla Hunt I  MRN: [id number]  Unit/Bed#: MICU 10 I Date of Admission: 1/10/2024   Date of Service: 2/16/2024 I Hospital Day: 37    Assessment/Plan   Goals of care, counseling/discussion  Assessment & Plan  Per previous discussion with palliative care provider, family aware of guarded prognosis. Spouse does have a clear limit that the patient has already placed which is no tracheostomy or long-term ventilator support.  Plan for ongoing conversation as clinical situation evolves.    Palliative care patient  Assessment & Plan  Decisional apparatus:  Patient is competent on my exam today.  If competence is lost, patient's substitute decision maker would default to spouse by [place] Act 169.  Advance Directive / Living Will / POLST:  None on file    PSC, including MSW support, will follow closely to continue to provide supportive care as clinical situation evolves    Anxiety state  Assessment & Plan  Continue Effexor  Add low dose IV Ativan 0.5 mg q6H PRN             Interval history:       Patient awake on the ventilator.  She is able to tell me that she is not having any pain but her anxiety is very significant.  She indicated to me that she is not sleeping well.  We discussed adding an anxiolytic and she is agreeable to this.  Family not currently at bedside.    MEDICATIONS / ALLERGIES:     all current active meds have been reviewed and current meds:   Current Facility-Administered Medications   Medication Dose Route Frequency    acetaminophen (TYLENOL) tablet 650 mg  650 mg Oral Q6H PRN    bisacodyl (DULCOLAX) rectal suppository 10 mg  10 mg Rectal Daily PRN    calcium carbonate (TUMS) chewable tablet 500 mg  500 mg Oral BID PRN    chlorhexidine (PERIDEX) 0.12 % oral rinse 15 mL  15 mL Mouth/Throat Q12H SARTHAK    dexmedeTOMIDine (Precedex) 400 mcg in sodium chloride 0.9% 100 mL  0.1-0.7 mcg/kg/hr Intravenous Titrated    enoxaparin (LOVENOX)  subcutaneous injection 40 mg  40 mg Subcutaneous Daily    famotidine (PEPCID) oral suspension 20 mg  20 mg Oral BID    HYDROmorphone (DILAUDID) 50 mg in sodium chloride 0.9% 50mL drip  0.5 mg/hr Intravenous Continuous    HYDROmorphone (DILAUDID) injection 0.5 mg  0.5 mg Intravenous Q1H PRN    insulin lispro (HumaLOG) 100 units/mL subcutaneous injection 1-5 Units  1-5 Units Subcutaneous Q6H SARTHAK    lamoTRIgine (LaMICtal) tablet 150 mg  150 mg Oral BID    lidocaine (PF) (XYLOCAINE-MPF) 1 % injection **ADS Override Pull**        LORazepam (ATIVAN) injection 0.5 mg  0.5 mg Intravenous Q6H PRN    methylPREDNISolone sodium succinate (Solu-MEDROL) injection 60 mg  60 mg Intravenous Q12H SARTHAK    metoprolol (LOPRESSOR) injection 2.5 mg  2.5 mg Intravenous Q6H PRN    nystatin (MYCOSTATIN) powder   Topical BID    ondansetron (ZOFRAN) injection 4 mg  4 mg Intravenous Q6H PRN    polyethylene glycol (MIRALAX) packet 17 g  17 g Oral Daily    QUEtiapine (SEROquel) tablet 12.5 mg  12.5 mg Oral HS    senna-docusate sodium (SENOKOT S) 8.6-50 mg per tablet 2 tablet  2 tablet Oral BID PRN    sodium chloride (AYR SALINE NASAL) nasal gel 1 Application  1 Application Nasal Q2H    sodium chloride (OCEAN) 0.65 % nasal spray 1 spray  1 spray Each Nare Q2H    sulfamethoxazole-trimethoprim (BACTRIM DS) 800-160 mg per tablet 2.5 tablet  5 mg/kg of trimethoprim Oral TID    topiramate (TOPAMAX) tablet 100 mg  100 mg Oral BID    venlafaxine (EFFEXOR) tablet 25 mg  25 mg Per NG Tube TID With Meals       No Known Allergies    OBJECTIVE:    Physical Exam  Physical Exam  Vitals and nursing note reviewed.   Constitutional:       General: She is not in acute distress.     Appearance: She is ill-appearing.   HENT:      Head: Normocephalic and atraumatic.      Right Ear: External ear normal.      Left Ear: External ear normal.   Eyes:      General:         Right eye: No discharge.         Left eye: No discharge.   Cardiovascular:      Rate and Rhythm: Normal  "rate and regular rhythm.   Pulmonary:      Comments: intubated  Abdominal:      General: There is no distension.      Palpations: Abdomen is soft.   Skin:     Coloration: Skin is pale.   Neurological:      Mental Status: She is oriented to person, place, and time.   Psychiatric:      Comments: Anxious.            Lab Results: I have personally reviewed pertinent labs., CBC:   Lab Results   Component Value Date    WBC 20.62 (H) 02/16/2024    HGB 10.2 (L) 02/16/2024    HCT 28.7 (L) 02/16/2024    MCV 88 02/16/2024     02/16/2024    RBC 3.28 (L) 02/16/2024    MCH 31.1 02/16/2024    MCHC 35.5 02/16/2024    RDW 17.0 (H) 02/16/2024    MPV 11.0 02/16/2024   , CMP:   Lab Results   Component Value Date    SODIUM 128 (L) 02/16/2024    K 4.8 02/16/2024     02/16/2024    CO2 18 (L) 02/16/2024    BUN 42 (H) 02/16/2024    CREATININE 1.22 02/16/2024    CALCIUM 9.7 02/16/2024    EGFR 48 02/16/2024   , PT/PTT:No results found for: \"PT\", \"PTT\"  Imaging Studies: Reviewed imaging  EKG, Pathology, and Other Studies: Reviewed    Counseling / Coordination of Care    Total floor / unit time spent today 25+ minutes. Greater than 50% of total time was spent with the patient and / or family counseling and / or coordination of care. A description of the counseling / coordination of care: Chart review, medication review, medication adjustments, discussion of goals of care, discussion with critical care team    Portions of this document may have been created using dictation software and as such some \"sound alike\" terms may have been generated by the system. Do not hesitate to contact me with any questions or clarifications.      "

## 2024-02-16 NOTE — PROGRESS NOTES
Progress Note - Infectious Disease   Carla Hunt 59 y.o. female MRN: [id number]  [facility] En[facility]: [id number]      Impression/Plan:  1. Recurrent acute hypoxic respiratory failure.  Initially in January felt to be due to COVID and patient was treated with antiviral and for concurrent bacterial pneumonia.  Clinically improved.  Deteriorated in late January and CT concerning for fibrosis.  Procalcitonin negative x 3.  Improved primarily with steroids.  She did receive 5 days of Bactrim/minocycline for presumptive respiratory infection and stenotrophomonas on cultures.  Subsequent BAL/bronchoscopy cultures were negative for bacterial growth, AFB and PCP.  Patient had been improving until 2 weeks ago when she deteriorated again.  Expectorated sputum showed stenotrophomonas.  CT again showing fibrotic changes without consolidation.  Organisms isolated may represent colonization at this point.  Patient remains on high-dose steroid along with Bactrim.  She is currently intubated.  Uncertain of further plans for bronchoscopy again.  Continue high-dose Bactrim for now as patient tolerates  Tentative plan for 14 days of therapy  Continue high-dose systemic corticosteroid, vetetri and IVIG per critical service  Trend fever curve/vitals  Repeat chemistry tomorrow to monitor electrolyte abnormalities  Repeat CBC tomorrow to monitor stability/drug side effect  Consider bronchoscopy to assess for opportunistic infections.  Micro lab contacted for additional susceptibilities to Levaquin/minocycline  Additional care per primary     2. Severe COVID, present on admission.  Patient was treated with remdesivir, dexamethasone and was given 1 dose of Tocilizumab on admission.  She also had a course of antibiotic initially for presumptive bacterial superinfection.  COVID antigen was negative prior to coming off isolation.  Current chest CT is showing extensive fibrotic changes.  No additional antiviral needed.     3.  CKD. Creatinine improved.  Monitor creatinine while on Bactrim     4. Encephalopathy on admission.  This has resolved.  There was concern for possible seizure but no witnessed seizure and EEG did not capture it.  Monitor mental status when off sedation     5. Seizure disorder, status post temporal lobe resection in .     6. B-cell lymphoma, on chemotherapy, which is currently postponed.  Patient was leukopenic on admission but WBC currently elevated, possibly from steroids/other medications  Monitor WBC/ANC.     Above plan discussed in detail with critical care resident who is in agreement with ongoing high-dose Bactrim and is aware of additional susceptibilities requested from the micro lab.    ID consult service will reevaluate this patient again on Monday.  Please contact ID attending on call questions in the interim.    Antibiotics:  Bactrim 7    24 Hour events:  Yesterday and overnight notes reviewed and notes mention increasing oxygen requirement on the vent.  No other acute events noted.    Subjective:  Patient intubated and sedated and does not interact on exam.    Objective:  Vitals:  Temp:  [99.1 °F (37.3 °C)-100 °F (37.8 °C)] 100 °F (37.8 °C)  HR:  [] 103  Resp:  [16-23] 17  SpO2:  [90 %-95 %] 91 %  Temp (24hrs), Av.7 °F (37.6 °C), Min:99.1 °F (37.3 °C), Max:100 °F (37.8 °C)  Current: Temperature: 100 °F (37.8 °C)    Physical Exam:   General Appearance:  Intubated and sedated and does not interact on exam.   Throat: ET tube in place without acute issue   Lungs:   Vented breath sounds throughout, no wheezes or rales   Heart:  RRR; no murmur, rub or gallop noted   Abdomen:   Soft, non-tender, non-distended, positive bowel sounds.     Extremities: No clubbing, cyanosis or edema   Skin: No new rashes or lesions. No new draining wounds noted.  No drug rashes appreciated.       Labs, Imaging, & Other studies:   All pertinent labs and imaging studies in PACS were personally reviewed as  below.  Results from last 7 days   Lab Units 02/16/24  0432 02/15/24  0448 02/14/24  0552   WBC Thousand/uL 20.62* 7.77 9.36   HEMOGLOBIN g/dL 10.2* 9.5* 10.0*   PLATELETS Thousands/uL 236 210 202     Results from last 7 days   Lab Units 02/16/24  0432 02/13/24  0552 02/12/24  0447   POTASSIUM mmol/L 4.8   < > 4.0   CHLORIDE mmol/L 101   < > 105   CO2 mmol/L 18*   < > 23   BUN mg/dL 42*   < > 31*   CREATININE mg/dL 1.22   < > 0.91   EGFR ml/min/1.73sq m 48   < > 69   CALCIUM mg/dL 9.7   < > 9.6   AST U/L  --   --  22   ALT U/L  --   --  35   ALK PHOS U/L  --   --  72    < > = values in this interval not displayed.     Results from last 7 days   Lab Units 02/14/24  1340 02/10/24  1516 02/09/24  1203   SPUTUM CULTURE  Culture too young- will reincubate  --  4+ Growth of Stenotrophomonas maltophilia*  2+ Growth of   GRAM STAIN RESULT  2+ Polys  1+ Epithelial Cells  No bacteria seen  --  No Epithelial cells per low power field*  No Polys*  4+ Gram negative rods*  Rare Gram positive cocci in clusters*   MRSA CULTURE ONLY   --  No Methicillin Resistant Staphlyococcus aureus (MRSA) isolated  --        Lab interpretation/comments: White count elevation noted, possibly related to steroids.  Patient's creatinine is stable.  Potassium 4.8.  Sodium borderline low.    Imaging interpretation/comments: No new imaging overnight    Culture data: Tracheal aspirate cultures only with mixed respiratory lauren.  Contacted micro lab for previous cultures on 2/9 for Levaquin and minocycline susceptibility.

## 2024-02-16 NOTE — PHYSICAL THERAPY NOTE
Physical Therapy Cancellation Note    PT orders received chart review completed. Pt is currently intubated/sedated and not appropriate to participate in skilled PT at this time. PT will follow and re-eval as medically appropriate.     02/16/24 1000   Note Type   Note type Cancelled Session   Cancel Reasons Intubated/sedated       Tania Augustin, PT

## 2024-02-16 NOTE — RESPIRATORY THERAPY NOTE
RT Ventilator Management Note  Carla Hunt 59 y.o. female MRN: [id number]  Unit/Bed#: [facility] 10 Encounter: [id number]      Daily Screen         2/16/2024  0100 2/16/2024  0818          Patient safety screen outcome:: Passed Passed      Not Ready for Weaning due to:: Underline problem not resolved Underline problem not resolved                Physical Exam:   Assessment Type: Assess only  General Appearance: Sedated  Respiratory Pattern: Assisted  Chest Assessment: Chest expansion symmetrical  Bilateral Breath Sounds: Diminished  Cough: None  Suction: ET Tube  O2 Device: vent      Resp Comments: (P) Pt switched to PS 6/+8 at this time. Tolerating well. RR and VT WNL. RT will cont to monitor.     02/16/24 0838   Respiratory Assessment   Resp Comments Pt switched to PS 6/+8 at this time. Tolerating well. RR and VT WNL. RT will cont to monitor.   Vent Information   Vent ID [id number]   Vent type Summers G5   Summers Vent Mode SPONT   $ Pulse Oximetry Spot Check Charge Completed   SPONT Settings   FIO2 (%) 60 %   PEEP (cmH2O) 8 cmH2O   Pressure Support (cmH2O) 6 cmH20   Flow Trigger (LPM) 3 LPM   P-ramp (ms) 25 ms   ETS  (%) 25 %   Humidification Heater   Heater Temp 95 °F (35 °C)   SPONT Actuals   Resp Rate (BPM) 16 BPM   VT (mL) 770 mL   MV (Obs) 12.8   MAP (cmH2O) 9.9 cmH2O   Peak Pressure (cmH2O) 18 cmH2O   I:E Ratio (Obs) 1:2.4   RSBI (f/vt) 23 f/Vt   Heater Temperature (Obs) 95 °F (35 °C)   SPONT Alarms   High Peak Pressure (cmH20) 40 cmH2O   High Resp Rate (BPM) 40 BPM   High MV (L/min) 18 L/min   Low MV (L/min) 4 L/min   High Spont VTE (mL) 1000 mL   Low Spont VTE (mL) 250 mL   Apnea Time (S) 20 S   SPONT Apnea Settings   Resp Rate (BPM) 15 BPM   FIO2 (%) 40 %   %TI (%) 1.3 %   Apnea Time (S) 20 S

## 2024-02-17 LAB
ANION GAP SERPL CALCULATED.3IONS-SCNC: 11 MMOL/L
ANISOCYTOSIS BLD QL SMEAR: PRESENT
BACTERIA SPT RESP CULT: ABNORMAL
BACTERIA SPT RESP CULT: ABNORMAL
BASOPHILS # BLD MANUAL: 0 THOUSAND/UL (ref 0–0.1)
BASOPHILS NFR MAR MANUAL: 0 % (ref 0–1)
BUN SERPL-MCNC: 71 MG/DL (ref 5–25)
CALCIUM SERPL-MCNC: 10.7 MG/DL (ref 8.4–10.2)
CHLORIDE SERPL-SCNC: 106 MMOL/L (ref 96–108)
CO2 SERPL-SCNC: 20 MMOL/L (ref 21–32)
CREAT SERPL-MCNC: 1.28 MG/DL (ref 0.6–1.3)
CRP SERPL QL: 288.4 MG/L
EOSINOPHIL # BLD MANUAL: 0 THOUSAND/UL (ref 0–0.4)
EOSINOPHIL NFR BLD MANUAL: 0 % (ref 0–6)
ERYTHROCYTE [DISTWIDTH] IN BLOOD BY AUTOMATED COUNT: 17.8 % (ref 11.6–15.1)
GFR SERPL CREATININE-BSD FRML MDRD: 46 ML/MIN/1.73SQ M
GLUCOSE SERPL-MCNC: 203 MG/DL (ref 65–140)
GLUCOSE SERPL-MCNC: 234 MG/DL (ref 65–140)
GLUCOSE SERPL-MCNC: 239 MG/DL (ref 65–140)
GLUCOSE SERPL-MCNC: 247 MG/DL (ref 65–140)
GLUCOSE SERPL-MCNC: 256 MG/DL (ref 65–140)
GLUCOSE SERPL-MCNC: 280 MG/DL (ref 65–140)
GRAM STN SPEC: ABNORMAL
HCT VFR BLD AUTO: 28.3 % (ref 34.8–46.1)
HGB BLD-MCNC: 9.5 G/DL (ref 11.5–15.4)
LG PLATELETS BLD QL SMEAR: PRESENT
LYMPHOCYTES # BLD AUTO: 0.23 THOUSAND/UL (ref 0.6–4.47)
LYMPHOCYTES # BLD AUTO: 1 % (ref 14–44)
MAGNESIUM SERPL-MCNC: 2.7 MG/DL (ref 1.9–2.7)
MCH RBC QN AUTO: 30.2 PG (ref 26.8–34.3)
MCHC RBC AUTO-ENTMCNC: 33.6 G/DL (ref 31.4–37.4)
MCV RBC AUTO: 90 FL (ref 82–98)
MONOCYTES # BLD AUTO: 0.23 THOUSAND/UL (ref 0–1.22)
MONOCYTES NFR BLD: 1 % (ref 4–12)
MYELOCYTES NFR BLD MANUAL: 1 % (ref 0–1)
NEUTROPHILS # BLD MANUAL: 22.24 THOUSAND/UL (ref 1.85–7.62)
NEUTS BAND NFR BLD MANUAL: 1 % (ref 0–8)
NEUTS SEG NFR BLD AUTO: 96 % (ref 43–75)
OSMOLALITY UR/SERPL-RTO: 327 MMOL/KG (ref 282–298)
OSMOLALITY UR/SERPL-RTO: 329 MMOL/KG (ref 282–298)
OSMOLALITY UR/SERPL-RTO: 332 MMOL/KG (ref 282–298)
OSMOLALITY UR/SERPL-RTO: 337 MMOL/KG (ref 282–298)
OSMOLALITY UR/SERPL-RTO: 339 MMOL/KG (ref 282–298)
OSMOLALITY UR/SERPL-RTO: 343 MMOL/KG (ref 282–298)
PLATELET # BLD AUTO: 268 THOUSANDS/UL (ref 149–390)
PLATELET BLD QL SMEAR: ADEQUATE
PLATELET CLUMP BLD QL SMEAR: PRESENT
PMV BLD AUTO: 11.2 FL (ref 8.9–12.7)
POLYCHROMASIA BLD QL SMEAR: PRESENT
POTASSIUM SERPL-SCNC: 5.1 MMOL/L (ref 3.5–5.3)
PROCALCITONIN SERPL-MCNC: 0.55 NG/ML
RBC # BLD AUTO: 3.15 MILLION/UL (ref 3.81–5.12)
RBC MORPH BLD: PRESENT
RHODAMINE-AURAMINE STN SPEC: NORMAL
SODIUM SERPL-SCNC: 137 MMOL/L (ref 135–147)
TRIGL SERPL-MCNC: 449 MG/DL
WBC # BLD AUTO: 22.93 THOUSAND/UL (ref 4.31–10.16)

## 2024-02-17 PROCEDURE — 99291 CRITICAL CARE FIRST HOUR: CPT | Performed by: INTERNAL MEDICINE

## 2024-02-17 PROCEDURE — 93005 ELECTROCARDIOGRAM TRACING: CPT

## 2024-02-17 PROCEDURE — 82948 REAGENT STRIP/BLOOD GLUCOSE: CPT

## 2024-02-17 PROCEDURE — 85027 COMPLETE CBC AUTOMATED: CPT | Performed by: STUDENT IN AN ORGANIZED HEALTH CARE EDUCATION/TRAINING PROGRAM

## 2024-02-17 PROCEDURE — 84145 PROCALCITONIN (PCT): CPT | Performed by: STUDENT IN AN ORGANIZED HEALTH CARE EDUCATION/TRAINING PROGRAM

## 2024-02-17 PROCEDURE — 86140 C-REACTIVE PROTEIN: CPT

## 2024-02-17 PROCEDURE — 87040 BLOOD CULTURE FOR BACTERIA: CPT | Performed by: STUDENT IN AN ORGANIZED HEALTH CARE EDUCATION/TRAINING PROGRAM

## 2024-02-17 PROCEDURE — 94760 N-INVAS EAR/PLS OXIMETRY 1: CPT

## 2024-02-17 PROCEDURE — 94003 VENT MGMT INPAT SUBQ DAY: CPT

## 2024-02-17 PROCEDURE — 85007 BL SMEAR W/DIFF WBC COUNT: CPT | Performed by: STUDENT IN AN ORGANIZED HEALTH CARE EDUCATION/TRAINING PROGRAM

## 2024-02-17 PROCEDURE — 84478 ASSAY OF TRIGLYCERIDES: CPT

## 2024-02-17 PROCEDURE — 80048 BASIC METABOLIC PNL TOTAL CA: CPT | Performed by: STUDENT IN AN ORGANIZED HEALTH CARE EDUCATION/TRAINING PROGRAM

## 2024-02-17 PROCEDURE — 83930 ASSAY OF BLOOD OSMOLALITY: CPT | Performed by: STUDENT IN AN ORGANIZED HEALTH CARE EDUCATION/TRAINING PROGRAM

## 2024-02-17 PROCEDURE — 83735 ASSAY OF MAGNESIUM: CPT | Performed by: STUDENT IN AN ORGANIZED HEALTH CARE EDUCATION/TRAINING PROGRAM

## 2024-02-17 RX ORDER — FUROSEMIDE 10 MG/ML
40 INJECTION INTRAMUSCULAR; INTRAVENOUS ONCE
Status: DISCONTINUED | OUTPATIENT
Start: 2024-02-17 | End: 2024-02-17

## 2024-02-17 RX ORDER — BUMETANIDE 0.25 MG/ML
2 INJECTION INTRAMUSCULAR; INTRAVENOUS ONCE
Qty: 8 ML | Refills: 0 | Status: COMPLETED | OUTPATIENT
Start: 2024-02-17 | End: 2024-02-17

## 2024-02-17 RX ORDER — INSULIN LISPRO 100 [IU]/ML
2 INJECTION, SOLUTION INTRAVENOUS; SUBCUTANEOUS EVERY 6 HOURS
Status: COMPLETED | OUTPATIENT
Start: 2024-02-17 | End: 2024-02-18

## 2024-02-17 RX ORDER — FUROSEMIDE 10 MG/ML
80 INJECTION INTRAMUSCULAR; INTRAVENOUS ONCE
Status: COMPLETED | OUTPATIENT
Start: 2024-02-17 | End: 2024-02-17

## 2024-02-17 RX ORDER — KETOROLAC TROMETHAMINE 30 MG/ML
15 INJECTION, SOLUTION INTRAMUSCULAR; INTRAVENOUS ONCE
Status: COMPLETED | OUTPATIENT
Start: 2024-02-17 | End: 2024-02-17

## 2024-02-17 RX ORDER — NOREPINEPHRINE BITARTRATE 1 MG/ML
INJECTION, SOLUTION INTRAVENOUS
Status: COMPLETED
Start: 2024-02-17 | End: 2024-02-18

## 2024-02-17 RX ORDER — METHYLPREDNISOLONE SODIUM SUCCINATE 125 MG/2ML
125 INJECTION, POWDER, LYOPHILIZED, FOR SOLUTION INTRAMUSCULAR; INTRAVENOUS EVERY 12 HOURS SCHEDULED
Status: COMPLETED | OUTPATIENT
Start: 2024-02-17 | End: 2024-02-22

## 2024-02-17 RX ADMIN — Medication 1 SPRAY: at 08:31

## 2024-02-17 RX ADMIN — FAMOTIDINE 20 MG: 40 POWDER, FOR SUSPENSION ORAL at 18:16

## 2024-02-17 RX ADMIN — Medication 1 APPLICATION: at 05:51

## 2024-02-17 RX ADMIN — LAMOTRIGINE 150 MG: 100 TABLET ORAL at 08:31

## 2024-02-17 RX ADMIN — HYDROMORPHONE HYDROCHLORIDE 0.5 MG: 1 INJECTION, SOLUTION INTRAMUSCULAR; INTRAVENOUS; SUBCUTANEOUS at 18:01

## 2024-02-17 RX ADMIN — INSULIN LISPRO 2 UNITS: 100 INJECTION, SOLUTION INTRAVENOUS; SUBCUTANEOUS at 14:24

## 2024-02-17 RX ADMIN — Medication 1 SPRAY: at 14:24

## 2024-02-17 RX ADMIN — Medication 1 APPLICATION: at 18:17

## 2024-02-17 RX ADMIN — LORAZEPAM 0.5 MG: 2 INJECTION INTRAMUSCULAR; INTRAVENOUS at 21:09

## 2024-02-17 RX ADMIN — CHLORHEXIDINE GLUCONATE 0.12% ORAL RINSE 15 ML: 1.2 LIQUID ORAL at 21:36

## 2024-02-17 RX ADMIN — ACETAMINOPHEN 650 MG: 325 TABLET, FILM COATED ORAL at 15:26

## 2024-02-17 RX ADMIN — TOPIRAMATE 100 MG: 100 TABLET, FILM COATED ORAL at 18:17

## 2024-02-17 RX ADMIN — KETOROLAC TROMETHAMINE 15 MG: 30 INJECTION, SOLUTION INTRAMUSCULAR; INTRAVENOUS at 22:05

## 2024-02-17 RX ADMIN — Medication 1 SPRAY: at 04:10

## 2024-02-17 RX ADMIN — Medication 1 APPLICATION: at 08:31

## 2024-02-17 RX ADMIN — Medication 1 APPLICATION: at 02:30

## 2024-02-17 RX ADMIN — Medication 1 SPRAY: at 16:00

## 2024-02-17 RX ADMIN — Medication 1 APPLICATION: at 10:00

## 2024-02-17 RX ADMIN — POLYETHYLENE GLYCOL 3350 17 G: 17 POWDER, FOR SOLUTION ORAL at 08:30

## 2024-02-17 RX ADMIN — INSULIN LISPRO 2 UNITS: 100 INJECTION, SOLUTION INTRAVENOUS; SUBCUTANEOUS at 20:21

## 2024-02-17 RX ADMIN — VENLAFAXINE 25 MG: 25 TABLET ORAL at 11:52

## 2024-02-17 RX ADMIN — FAMOTIDINE 20 MG: 40 POWDER, FOR SUSPENSION ORAL at 08:32

## 2024-02-17 RX ADMIN — Medication 1 SPRAY: at 10:00

## 2024-02-17 RX ADMIN — Medication 1 SPRAY: at 18:15

## 2024-02-17 RX ADMIN — INSULIN LISPRO 2 UNITS: 100 INJECTION, SOLUTION INTRAVENOUS; SUBCUTANEOUS at 08:35

## 2024-02-17 RX ADMIN — NYSTATIN: 100000 POWDER TOPICAL at 18:17

## 2024-02-17 RX ADMIN — SULFAMETHOXAZOLE AND TRIMETHOPRIM 2.5 TABLET: 800; 160 TABLET ORAL at 08:32

## 2024-02-17 RX ADMIN — Medication 1 APPLICATION: at 00:22

## 2024-02-17 RX ADMIN — TOPIRAMATE 100 MG: 100 TABLET, FILM COATED ORAL at 08:34

## 2024-02-17 RX ADMIN — HYDROMORPHONE HYDROCHLORIDE 0.5 MG: 1 INJECTION, SOLUTION INTRAMUSCULAR; INTRAVENOUS; SUBCUTANEOUS at 16:54

## 2024-02-17 RX ADMIN — Medication 1 SPRAY: at 21:37

## 2024-02-17 RX ADMIN — INSULIN LISPRO 1 UNITS: 100 INJECTION, SOLUTION INTRAVENOUS; SUBCUTANEOUS at 18:17

## 2024-02-17 RX ADMIN — Medication 1 SPRAY: at 02:30

## 2024-02-17 RX ADMIN — INSULIN LISPRO 2 UNITS: 100 INJECTION, SOLUTION INTRAVENOUS; SUBCUTANEOUS at 11:48

## 2024-02-17 RX ADMIN — Medication 1 APPLICATION: at 21:37

## 2024-02-17 RX ADMIN — DEXMEDETOMIDINE HYDROCHLORIDE 0.2 MCG/KG/HR: 4 INJECTION, SOLUTION INTRAVENOUS at 06:38

## 2024-02-17 RX ADMIN — LAMOTRIGINE 150 MG: 100 TABLET ORAL at 18:14

## 2024-02-17 RX ADMIN — Medication 1 APPLICATION: at 04:10

## 2024-02-17 RX ADMIN — INSULIN LISPRO 2 UNITS: 100 INJECTION, SOLUTION INTRAVENOUS; SUBCUTANEOUS at 00:22

## 2024-02-17 RX ADMIN — BUMETANIDE 2 MG: 0.25 INJECTION INTRAMUSCULAR; INTRAVENOUS at 18:14

## 2024-02-17 RX ADMIN — Medication 1 SPRAY: at 11:47

## 2024-02-17 RX ADMIN — VENLAFAXINE 25 MG: 25 TABLET ORAL at 08:30

## 2024-02-17 RX ADMIN — VENLAFAXINE 25 MG: 25 TABLET ORAL at 16:30

## 2024-02-17 RX ADMIN — NYSTATIN: 100000 POWDER TOPICAL at 08:32

## 2024-02-17 RX ADMIN — SULFAMETHOXAZOLE AND TRIMETHOPRIM 2.5 TABLET: 800; 160 TABLET ORAL at 15:36

## 2024-02-17 RX ADMIN — NOREPINEPHRINE BITARTRATE 3 MCG/MIN: 1 INJECTION, SOLUTION, CONCENTRATE INTRAVENOUS at 22:00

## 2024-02-17 RX ADMIN — Medication 1 APPLICATION: at 16:00

## 2024-02-17 RX ADMIN — Medication 1 SPRAY: at 00:22

## 2024-02-17 RX ADMIN — Medication 1 SPRAY: at 05:51

## 2024-02-17 RX ADMIN — ENOXAPARIN SODIUM 40 MG: 40 INJECTION SUBCUTANEOUS at 08:30

## 2024-02-17 RX ADMIN — METHYLPREDNISOLONE SODIUM SUCCINATE 60 MG: 125 INJECTION, POWDER, FOR SOLUTION INTRAMUSCULAR; INTRAVENOUS at 08:31

## 2024-02-17 RX ADMIN — Medication 1 APPLICATION: at 20:22

## 2024-02-17 RX ADMIN — FUROSEMIDE 80 MG: 10 INJECTION, SOLUTION INTRAMUSCULAR; INTRAVENOUS at 11:30

## 2024-02-17 RX ADMIN — Medication 1 APPLICATION: at 14:24

## 2024-02-17 RX ADMIN — Medication 1 SPRAY: at 20:22

## 2024-02-17 RX ADMIN — HYDROMORPHONE HYDROCHLORIDE 0.5 MG: 1 INJECTION, SOLUTION INTRAMUSCULAR; INTRAVENOUS; SUBCUTANEOUS at 12:33

## 2024-02-17 RX ADMIN — CHLORHEXIDINE GLUCONATE 0.12% ORAL RINSE 15 ML: 1.2 LIQUID ORAL at 08:31

## 2024-02-17 RX ADMIN — SULFAMETHOXAZOLE AND TRIMETHOPRIM 2.5 TABLET: 800; 160 TABLET ORAL at 20:21

## 2024-02-17 RX ADMIN — Medication 1 APPLICATION: at 11:47

## 2024-02-17 RX ADMIN — INSULIN LISPRO 2 UNITS: 100 INJECTION, SOLUTION INTRAVENOUS; SUBCUTANEOUS at 05:51

## 2024-02-17 RX ADMIN — METHYLPREDNISOLONE SODIUM SUCCINATE 125 MG: 125 INJECTION, POWDER, FOR SOLUTION INTRAMUSCULAR; INTRAVENOUS at 21:36

## 2024-02-17 NOTE — RESPIRATORY THERAPY NOTE
RT Ventilator Management Note  Carla Hunt 59 y.o. female MRN: [id number]  Unit/Bed#: [facility] 10 Encounter: [id number]      Daily Screen         2/16/2024 2026 2/17/2024 0804          Patient safety screen outcome:: Passed Passed                Physical Exam:   Assessment Type: Assess only  General Appearance: Sleeping  Respiratory Pattern: Assisted  Chest Assessment: Chest expansion symmetrical  Bilateral Breath Sounds: Clear, Diminished  Cough: Strong  Suction: ET Tube  O2 Device: vent      Resp Comments: Pt received on ASV settings. Tolerating well. No changes at this time. RT will cont to monitor.     02/17/24 0804   Respiratory Assessment   Resp Comments Pt received on ASV settings. Tolerating well. No changes at this time. RT will cont to monitor.   Vent Information   Vent ID [id number]   Vent type Summers G5   Summers Vent Mode ASV   $ Pulse Oximetry Spot Check Charge Completed   ASV Settings   P-ramp (ms) 25 (ms)   P ASV Limit (cmH2O) 45 cmH2O   ETS (%) 30 %   FIO2 (%) 80 %   PEEP (cmH2O) 10 cmH2O   % MinVol 145 %   % MinVol Target 9.7 %   Humidification Heater   Heater Temp 87.8 °F (31 °C)   ASV Actuals   Resp Rate (BPM) 18 BPM   VT (mL) 901 mL   MV (Obs) 11.1   MAP (cmH2O) 13 cmH2O   Peak Pressure (cmH2O) 17 cmH2O   Pressure Support (cmH2O) 6 cmH2O   I:E Ratio (Obs) 1:5   Heater Temperature (Obs) 95 °F (35 °C)   ASV Alarms   High Peak Pressure (cmH2O) 55 cmH2O   Low Pressure (cmH2O) 5 cmH2O   High Resp Rate (BPM) 40 BPM   Low Resp Rate (BPM) 8 BPM   High MV (L/min) 20 L/min   Low MV (L/min) 3 L/min   High VT (mL) 1200 mL   Low VT (mL) 200 mL   Apnea Time (s) 20 S   Daily Screen   Patient safety screen outcome: Passed   IHI Ventilator Associated Pneumonia Bundle   Daily Assessment of Readiness to Extubate Yes   ETT  Cuffed 7.5 mm   Placement Date/Time: 02/13/24 c) 5750   Type: Cuffed  Tube Size: 7.5 mm  Location: Oral  Insertion attempts: 1  Placement Verification: End tidal CO2;Symmetrical chest wall  movement  Secured at (cm): 23   Secured at (cm) 23   Measured from Lips   Secured Location Left   Repositioned Center to Left   Secured by Commercial tube escudero   Site Condition Dry   Cuff Pressure (color) Green   HI-LO Suction  Intermittent suction   HI-LO Secretions Scant   HI-LO Intervention Patent

## 2024-02-17 NOTE — PROGRESS NOTES
[facility]  Progress Note: Critical Care  Name: Caral Hunt 59 y.o. 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  Hyponatremia  B cell lymphoma  CKD stage III  Seizure disorder  Anxiety     Neuro:   Sedation: Off propofol, now on precedex and dilaudid drip     Diagnosis: seizure disorder  -S/p partial left temporal lobe resection in 2007  -Contuinue home lamictal 150 bid and topamax 100 bid     Diagnosis: Anxiety  -On venlafaxine 25 mg TID via NGT  -Seroquel 12.5 hs and melatonin 6 hs for sleep     CV:   No active issues     Pulm:  Diagnosis: Acute hypoxic respiratory failure due to severe covid  and covid induced fibrosis  -Tested COVID positive on 1/7  -Completed severe COVID pathway and 7 days CTX  -Tenuous respiratory status requiring multiple re-admissions to MICU  -S/p Bronch 2/2   -NG on cultures (initially showed non-group A strep)  -PCP and AFB negative   -Legionella, viral, fungal cultures no growth to date  -Pulse dose steroids with solumedrol 1g daily x3 days (completed 2/7) then transitioned to prednisone 80mg daily on 2/8  -Vent settings: APVcmv 14/400/8/60%  -Currently on solumedrol 60 mg Q12H  -CRP trend 15.8 (02/14) > 8.3 (02/15) > 35.9 (02/16) > 288 (02/17). Appears to related to steroid taper more so than IVIG. 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) though unclear how this was diagnosed, this also does not enirely fit as she is NOT hypokalemic  -Continue to monitor I/O and UOP  -sCr slowly slimbing up, likely due to bactrim, we are working with ID to see if patient can be transitioned to levaquin or minocycline     Diagnosis: Hyponatremia, likely resolved  -Noted to have sodium 133>129>128>137 today  -Urine osmolality, urine sodium and osmolality reviewed  -Osmolality serum- 302 (elevated), IVIG is known to cause this  -Will continue to monitor this     F/E/N:   F: none, boluses as indicated   E: monitor and replete for goal K>4, P>3, Mg>2  N: Tube feeds      Heme/Onc:   Diagnosis: B cell lymphoma  -Follows with heme/onc at [facility]  -On rituxan for 2 year course, started May 2022  -Last dose was 12/20, treatment currently on hold   -Leukopenic on admission but WBC now wnl     DVT ppx with lovenox      Endo:   Diagnosis: steroid hyperglycemia and diabetes mellitus type 2, A1c at 6.6 from 01/2024  -SSI, hypoglycemic protocol  -On SSI #2  -On tube feeds, glucerna  -Goal -180, ranges 164-256  -Added lispro Q6H 2units     ID:   Diagnosis: Severe covid pneumonia and stenotrophomonas pneumonia  -Completed severe COVID pathway with decadron (ended 1/22) and remdesivir (ended 1/20), also completed 7 days CXT on 1/15  -C/f opportunistic infections given immunocompromised status on chemotherapy and recent steroid use  -No consolidations on CXR and procal negative  -S/p bactrim and minocycline x5 days for stenotrophomonas on sputum culture (1/25), then on bactrim for PJP ppx  -Bronc on 2/2 - Cx w/o growth (initially resulted as 1+ alpha hemolytic strep), AFB & PCP negative, f/u fungal, legionella, and viral cultures   -2/10 pt again had escalating O2 requirements on floors necessitating readmission to ICU  -Concern that with recent pulse dose steroids and now worsening respiratory status  she could have infection, possibly opportunistic   -Repeat sputum culture 2/9 with 4+ stenotrophomonas  -On high dose bactrim, day 8 today. Plan to treat for a 14 day course.         MSK/Skin:   -Wound care team following for bilateral sacral wounds       Disposition: Critical care    ICU Core Measures     Vented Patient  VAP Bundle  VAP bundle ordered     A: Assess, Prevent, and Manage Pain Has pain been assessed? Yes  Need for changes to pain regimen? No   B: Both Spontaneous Awakening Trials (SATs) and Spontaneous Breathing Trials (SBTs) Plan to perform spontaneous awakening trial today? Yes   Plan to perform spontaneous breathing trial today? Yes   Obvious barriers to extubation? Yes   C: Choice of Sedation RASS Goal: 0 Alert and Calm  Need for changes to sedation or analgesia regimen? No   D: Delirium CAM-ICU: Negative   E: Early Mobility  Plan for early mobility? Yes   F: Family Engagement Plan for family engagement today? Yes       Antibiotic Review: Patient on appropriate coverage based on culture data.     Review of Invasive Devices:    Ortiz Plan: Continue for accurate I/O monitoring for 48 hours    Odum Plan: Keep arterial line for hemodynamic monitoring    Prophylaxis:  VTE VTE covered by:  enoxaparin, Subcutaneous, 40 mg at 02/16/24 0810       Stress Ulcer  covered byfamotidine (PEPCID) oral suspension 20 mg [[id number]]        Significant 24hr Events     24hr events: Overnight, patients FiO2 was increased from 70% to 80%.      Subjective   Patient seen by bedside. Will discuss medical updates with patients family today.    Review of Systems   Unable to perform ROS: Patient nonverbal (Currently intubated)        Objective                            Vitals I/O      Most Recent Min/Max in 24hrs   Temp 97.9 °F (36.6 °C) Temp  Min: 97.9 °F (36.6 °C)  Max: 100 °F (37.8 °C)   Pulse 88 Pulse  Min: 80  Max: 114   Resp 21 Resp  Min: 6  Max: 21   BP 90/50 No data recorded   O2 Sat 93 % SpO2  Min: 87 %  Max: 95 %       Intake/Output Summary (Last 24 hours) at 2/17/2024 0748  Last data filed at 2/17/2024 0638  Gross per 24 hour   Intake 1566.82 ml   Output 1125 ml   Net 441.82 ml       Diet Enteral/Parenteral; Tube Feeding No Oral Diet; Glucerna 1.2; Continuous; 60; 50; Every 6 hours    Invasive Monitoring   Arterial Line  M[place] /60  Arterial Line BP  Min: 95/50  Max: 145/69   MAP 79 mmHg  Arterial Line MAP (mmHg)  Min: 65 mmHg  Max: 98 mmHg           Physical Exam   Physical Exam  Vitals reviewed.   HENT:      Head: Normocephalic.      Mouth/Throat:      Mouth: Mucous membranes are moist.   Cardiovascular:      Rate and Rhythm: Normal rate and regular rhythm.      Pulses: Normal pulses.   Abdominal:      Palpations: Abdomen is soft.   Constitutional:       Appearance: She is ill-appearing.      Interventions: She is sedated.      Comments: Has 3x PIVs, Ortiz, ETT and NGT, arterial line on left   Pulmonary:      Effort: Pulmonary effort is normal.      Comments: Mechanically ventilated breath sounds  Neurological:      Mental Status: She is calm.      Comments: Able to follow commands, able to move all extremities spontaneously   Genitourinary/Anorectal:  Ortiz present.          Diagnostic Studies      EKG: Reviewed  Imaging: Reviewed I have personally reviewed pertinent reports.       Medications:  Scheduled PRN   chlorhexidine, 15 mL, Q12H SARTHAK  enoxaparin, 40 mg, Daily  famotidine, 20 mg, BID  insulin lispro, 1-5 Units, Q6H SARTHAK  lamoTRIgine, 150 mg, BID  methylPREDNISolone sodium succinate, 60 mg, Q12H SARTHAK  nystatin, , BID  polyethylene glycol, 17 g, Daily  QUEtiapine, 12.5 mg, HS  sodium chloride, 1 Application, Q2H  sodium chloride, 1 spray, Q2H  sulfamethoxazole-trimethoprim, 5 mg/kg of trimethoprim, TID  topiramate, 100 mg, BID  venlafaxine, 25 mg, TID With Meals      acetaminophen, 650 mg, Q6H PRN  bisacodyl, 10 mg, Daily PRN  calcium carbonate, 500 mg, BID PRN  HYDROmorphone, 0.5 mg, Q1H PRN  LORazepam, 0.5 mg,  Q6H PRN  metoprolol, 2.5 mg, Q6H PRN  ondansetron, 4 mg, Q6H PRN  senna-docusate sodium, 2 tablet, BID PRN       Continuous    dexmedetomidine, 0.1-0.7 mcg/kg/hr, Last Rate: 0.2 mcg/kg/hr (02/17/24 0638)  HYDROmorphone, 0.5 mg/hr, Last Rate: 0.5 mg/hr (02/16/24 1120)         Labs:    CBC    Recent Labs     02/16/24 0432 02/17/24  0511   WBC 20.62* 22.93*   HGB 10.2* 9.5*   HCT 28.7* 28.3*    268     BMP    Recent Labs     02/16/24 0432 02/17/24  0511   SODIUM 128* 137   K 4.8 5.1    106   CO2 18* 20*   AGAP 9 11   BUN 42* 71*   CREATININE 1.22 1.28   CALCIUM 9.7 10.7*       Coags    No recent results     Additional Electrolytes  Recent Labs     02/16/24 0432 02/17/24  0511   MG 2.2 2.7          Blood Gas    Recent Labs     02/16/24  0756   PHART 7.351   [id number] 37.0   PO2ART 119.3   [id number] 20.0*   BEART -5.0   SOURCE Line, Arterial     Recent Labs     02/16/24  0756   SOURCE Line, Arterial    LFTs  Recent Labs     02/16/24  1833   ALT 24   AST 17   ALKPHOS 72   ALB 3.1*   TBILI 0.68       Infectious  No recent results  Glucose  Recent Labs     02/16/24  0432 02/17/24  0511   GLUC 142* 247*             Miguel Whittaker MD

## 2024-02-18 LAB
ANION GAP SERPL CALCULATED.3IONS-SCNC: 11 MMOL/L
ATRIAL RATE: 163 BPM
BACTERIA BRONCH AEROBE CULT: ABNORMAL
BACTERIA UR QL AUTO: ABNORMAL /HPF
BILIRUB UR QL STRIP: NEGATIVE
BUN SERPL-MCNC: 105 MG/DL (ref 5–25)
CALCIUM SERPL-MCNC: 10.9 MG/DL (ref 8.4–10.2)
CHLORIDE SERPL-SCNC: 101 MMOL/L (ref 96–108)
CLARITY UR: ABNORMAL
CO2 SERPL-SCNC: 21 MMOL/L (ref 21–32)
COLOR UR: ABNORMAL
CREAT SERPL-MCNC: 1.92 MG/DL (ref 0.6–1.3)
CRP SERPL QL: 221 MG/L
ERYTHROCYTE [DISTWIDTH] IN BLOOD BY AUTOMATED COUNT: 18.5 % (ref 11.6–15.1)
GFR SERPL CREATININE-BSD FRML MDRD: 28 ML/MIN/1.73SQ M
GLUCOSE SERPL-MCNC: 192 MG/DL (ref 65–140)
GLUCOSE SERPL-MCNC: 193 MG/DL (ref 65–140)
GLUCOSE SERPL-MCNC: 212 MG/DL (ref 65–140)
GLUCOSE SERPL-MCNC: 241 MG/DL (ref 65–140)
GLUCOSE SERPL-MCNC: 266 MG/DL (ref 65–140)
GLUCOSE SERPL-MCNC: 275 MG/DL (ref 65–140)
GLUCOSE SERPL-MCNC: 282 MG/DL (ref 65–140)
GLUCOSE UR STRIP-MCNC: NEGATIVE MG/DL
GRAM STN SPEC: ABNORMAL
GRAN CASTS #/AREA URNS LPF: ABNORMAL /[LPF]
HCT VFR BLD AUTO: 24.9 % (ref 34.8–46.1)
HGB BLD-MCNC: 8.8 G/DL (ref 11.5–15.4)
HGB UR QL STRIP.AUTO: ABNORMAL
HYALINE CASTS #/AREA URNS LPF: ABNORMAL /LPF
KETONES UR STRIP-MCNC: NEGATIVE MG/DL
LEUKOCYTE ESTERASE UR QL STRIP: ABNORMAL
MAGNESIUM SERPL-MCNC: 3 MG/DL (ref 1.9–2.7)
MCH RBC QN AUTO: 30.4 PG (ref 26.8–34.3)
MCHC RBC AUTO-ENTMCNC: 35.3 G/DL (ref 31.4–37.4)
MCV RBC AUTO: 86 FL (ref 82–98)
MUCOUS THREADS UR QL AUTO: ABNORMAL
NITRITE UR QL STRIP: NEGATIVE
NON-SQ EPI CELLS URNS QL MICRO: ABNORMAL /HPF
OSMOLALITY UR/SERPL-RTO: 345 MMOL/KG (ref 282–298)
OSMOLALITY UR/SERPL-RTO: 345 MMOL/KG (ref 282–298)
OSMOLALITY UR/SERPL-RTO: 347 MMOL/KG (ref 282–298)
OSMOLALITY UR/SERPL-RTO: 353 MMOL/KG (ref 282–298)
OSMOLALITY UR/SERPL-RTO: 360 MMOL/KG (ref 282–298)
P AXIS: 68 DEGREES
PH UR STRIP.AUTO: 5.5 [PH]
PLATELET # BLD AUTO: 355 THOUSANDS/UL (ref 149–390)
PMV BLD AUTO: 10.9 FL (ref 8.9–12.7)
POTASSIUM SERPL-SCNC: 5.6 MMOL/L (ref 3.5–5.3)
PR INTERVAL: 132 MS
PROCALCITONIN SERPL-MCNC: 0.9 NG/ML
PROT UR STRIP-MCNC: ABNORMAL MG/DL
QRS AXIS: 31 DEGREES
QRSD INTERVAL: 80 MS
QT INTERVAL: 326 MS
QTC INTERVAL: 536 MS
RBC # BLD AUTO: 2.89 MILLION/UL (ref 3.81–5.12)
RBC #/AREA URNS AUTO: ABNORMAL /HPF
SODIUM SERPL-SCNC: 133 MMOL/L (ref 135–147)
SP GR UR STRIP.AUTO: 1.01 (ref 1–1.03)
T WAVE AXIS: 61 DEGREES
URATE CRY URNS QL MICRO: ABNORMAL /HPF
UROBILINOGEN UR STRIP-ACNC: <2 MG/DL
VENTRICULAR RATE: 163 BPM
WBC # BLD AUTO: 27.26 THOUSAND/UL (ref 4.31–10.16)
WBC #/AREA URNS AUTO: ABNORMAL /HPF

## 2024-02-18 PROCEDURE — 82948 REAGENT STRIP/BLOOD GLUCOSE: CPT

## 2024-02-18 PROCEDURE — 83930 ASSAY OF BLOOD OSMOLALITY: CPT | Performed by: STUDENT IN AN ORGANIZED HEALTH CARE EDUCATION/TRAINING PROGRAM

## 2024-02-18 PROCEDURE — 86140 C-REACTIVE PROTEIN: CPT

## 2024-02-18 PROCEDURE — 83735 ASSAY OF MAGNESIUM: CPT | Performed by: STUDENT IN AN ORGANIZED HEALTH CARE EDUCATION/TRAINING PROGRAM

## 2024-02-18 PROCEDURE — 84478 ASSAY OF TRIGLYCERIDES: CPT | Performed by: STUDENT IN AN ORGANIZED HEALTH CARE EDUCATION/TRAINING PROGRAM

## 2024-02-18 PROCEDURE — 84145 PROCALCITONIN (PCT): CPT | Performed by: STUDENT IN AN ORGANIZED HEALTH CARE EDUCATION/TRAINING PROGRAM

## 2024-02-18 PROCEDURE — 85027 COMPLETE CBC AUTOMATED: CPT | Performed by: STUDENT IN AN ORGANIZED HEALTH CARE EDUCATION/TRAINING PROGRAM

## 2024-02-18 PROCEDURE — 81001 URINALYSIS AUTO W/SCOPE: CPT

## 2024-02-18 PROCEDURE — 80048 BASIC METABOLIC PNL TOTAL CA: CPT | Performed by: STUDENT IN AN ORGANIZED HEALTH CARE EDUCATION/TRAINING PROGRAM

## 2024-02-18 PROCEDURE — 99291 CRITICAL CARE FIRST HOUR: CPT | Performed by: INTERNAL MEDICINE

## 2024-02-18 PROCEDURE — 93010 ELECTROCARDIOGRAM REPORT: CPT | Performed by: INTERNAL MEDICINE

## 2024-02-18 PROCEDURE — 94760 N-INVAS EAR/PLS OXIMETRY 1: CPT

## 2024-02-18 PROCEDURE — 87081 CULTURE SCREEN ONLY: CPT | Performed by: STUDENT IN AN ORGANIZED HEALTH CARE EDUCATION/TRAINING PROGRAM

## 2024-02-18 PROCEDURE — 94003 VENT MGMT INPAT SUBQ DAY: CPT

## 2024-02-18 RX ORDER — INSULIN GLARGINE 100 [IU]/ML
9 INJECTION, SOLUTION SUBCUTANEOUS
Status: DISCONTINUED | OUTPATIENT
Start: 2024-02-18 | End: 2024-02-19

## 2024-02-18 RX ORDER — VANCOMYCIN HYDROCHLORIDE 1 G/200ML
15 INJECTION, SOLUTION INTRAVENOUS EVERY 12 HOURS
Status: DISCONTINUED | OUTPATIENT
Start: 2024-02-18 | End: 2024-02-18

## 2024-02-18 RX ORDER — LACTULOSE 10 G/15ML
20 SOLUTION ORAL 3 TIMES DAILY
Status: DISCONTINUED | OUTPATIENT
Start: 2024-02-18 | End: 2024-02-20

## 2024-02-18 RX ORDER — METOLAZONE 2.5 MG/1
2.5 TABLET ORAL DAILY
Status: DISCONTINUED | OUTPATIENT
Start: 2024-02-18 | End: 2024-02-19

## 2024-02-18 RX ORDER — BUMETANIDE 0.25 MG/ML
2 INJECTION INTRAMUSCULAR; INTRAVENOUS ONCE
Status: COMPLETED | OUTPATIENT
Start: 2024-02-18 | End: 2024-02-18

## 2024-02-18 RX ORDER — VANCOMYCIN HYDROCHLORIDE 1 G/200ML
1000 INJECTION, SOLUTION INTRAVENOUS EVERY 24 HOURS
Status: DISCONTINUED | OUTPATIENT
Start: 2024-02-18 | End: 2024-02-19

## 2024-02-18 RX ORDER — AMOXICILLIN 250 MG
2 CAPSULE ORAL 2 TIMES DAILY
Status: DISCONTINUED | OUTPATIENT
Start: 2024-02-18 | End: 2024-02-20

## 2024-02-18 RX ADMIN — FAMOTIDINE 20 MG: 40 POWDER, FOR SUSPENSION ORAL at 17:23

## 2024-02-18 RX ADMIN — NOREPINEPHRINE BITARTRATE: 1 INJECTION, SOLUTION, CONCENTRATE INTRAVENOUS at 01:10

## 2024-02-18 RX ADMIN — INSULIN GLARGINE 9 UNITS: 100 INJECTION, SOLUTION SUBCUTANEOUS at 21:16

## 2024-02-18 RX ADMIN — VANCOMYCIN HYDROCHLORIDE 1000 MG: 1 INJECTION, SOLUTION INTRAVENOUS at 20:46

## 2024-02-18 RX ADMIN — LACTULOSE 20 G: 20 SOLUTION ORAL at 21:16

## 2024-02-18 RX ADMIN — VENLAFAXINE 25 MG: 25 TABLET ORAL at 08:49

## 2024-02-18 RX ADMIN — Medication 1 SPRAY: at 12:00

## 2024-02-18 RX ADMIN — DEXMEDETOMIDINE HYDROCHLORIDE 0.3 MCG/KG/HR: 4 INJECTION, SOLUTION INTRAVENOUS at 23:54

## 2024-02-18 RX ADMIN — Medication 1 SPRAY: at 21:35

## 2024-02-18 RX ADMIN — BUMETANIDE 2 MG: 0.25 INJECTION INTRAMUSCULAR; INTRAVENOUS at 12:48

## 2024-02-18 RX ADMIN — METHYLPREDNISOLONE SODIUM SUCCINATE 125 MG: 125 INJECTION, POWDER, FOR SOLUTION INTRAMUSCULAR; INTRAVENOUS at 21:16

## 2024-02-18 RX ADMIN — POLYETHYLENE GLYCOL 3350 17 G: 17 POWDER, FOR SOLUTION ORAL at 08:47

## 2024-02-18 RX ADMIN — Medication 1 SPRAY: at 00:07

## 2024-02-18 RX ADMIN — HYDROMORPHONE HYDROCHLORIDE 1 MG/HR: 10 INJECTION, SOLUTION INTRAMUSCULAR; INTRAVENOUS; SUBCUTANEOUS at 05:35

## 2024-02-18 RX ADMIN — Medication 1 APPLICATION: at 17:19

## 2024-02-18 RX ADMIN — Medication 1 SPRAY: at 04:50

## 2024-02-18 RX ADMIN — INSULIN LISPRO 2 UNITS: 100 INJECTION, SOLUTION INTRAVENOUS; SUBCUTANEOUS at 00:50

## 2024-02-18 RX ADMIN — Medication 1 APPLICATION: at 10:00

## 2024-02-18 RX ADMIN — Medication 1 APPLICATION: at 05:56

## 2024-02-18 RX ADMIN — SULFAMETHOXAZOLE AND TRIMETHOPRIM 2.5 TABLET: 800; 160 TABLET ORAL at 17:00

## 2024-02-18 RX ADMIN — CEFEPIME 2000 MG: 2 INJECTION, POWDER, FOR SOLUTION INTRAVENOUS at 19:39

## 2024-02-18 RX ADMIN — Medication 1 APPLICATION: at 00:07

## 2024-02-18 RX ADMIN — CEFEPIME 2000 MG: 2 INJECTION, POWDER, FOR SOLUTION INTRAVENOUS at 08:45

## 2024-02-18 RX ADMIN — SULFAMETHOXAZOLE AND TRIMETHOPRIM 2.5 TABLET: 800; 160 TABLET ORAL at 21:29

## 2024-02-18 RX ADMIN — SENNOSIDES, DOCUSATE SODIUM 2 TABLET: 8.6; 5 TABLET ORAL at 02:17

## 2024-02-18 RX ADMIN — Medication 1 SPRAY: at 05:56

## 2024-02-18 RX ADMIN — ACETAMINOPHEN 650 MG: 325 TABLET, FILM COATED ORAL at 17:08

## 2024-02-18 RX ADMIN — VENLAFAXINE 25 MG: 25 TABLET ORAL at 17:13

## 2024-02-18 RX ADMIN — CHLORHEXIDINE GLUCONATE 0.12% ORAL RINSE 15 ML: 1.2 LIQUID ORAL at 08:46

## 2024-02-18 RX ADMIN — NYSTATIN: 100000 POWDER TOPICAL at 09:15

## 2024-02-18 RX ADMIN — SENNOSIDES, DOCUSATE SODIUM 2 TABLET: 8.6; 5 TABLET ORAL at 21:16

## 2024-02-18 RX ADMIN — FAMOTIDINE 20 MG: 40 POWDER, FOR SUSPENSION ORAL at 08:46

## 2024-02-18 RX ADMIN — Medication 1 SPRAY: at 19:39

## 2024-02-18 RX ADMIN — Medication 1 APPLICATION: at 08:47

## 2024-02-18 RX ADMIN — INSULIN LISPRO 2 UNITS: 100 INJECTION, SOLUTION INTRAVENOUS; SUBCUTANEOUS at 19:40

## 2024-02-18 RX ADMIN — Medication 1 APPLICATION: at 16:00

## 2024-02-18 RX ADMIN — Medication 1 APPLICATION: at 23:55

## 2024-02-18 RX ADMIN — Medication 1 SPRAY: at 02:17

## 2024-02-18 RX ADMIN — Medication 1 APPLICATION: at 12:00

## 2024-02-18 RX ADMIN — DEXMEDETOMIDINE HYDROCHLORIDE 0.4 MCG/KG/HR: 4 INJECTION, SOLUTION INTRAVENOUS at 05:35

## 2024-02-18 RX ADMIN — Medication 1 SPRAY: at 14:47

## 2024-02-18 RX ADMIN — SULFAMETHOXAZOLE AND TRIMETHOPRIM 2.5 TABLET: 800; 160 TABLET ORAL at 08:48

## 2024-02-18 RX ADMIN — SENNOSIDES, DOCUSATE SODIUM 2 TABLET: 8.6; 5 TABLET ORAL at 09:14

## 2024-02-18 RX ADMIN — TOPIRAMATE 100 MG: 100 TABLET, FILM COATED ORAL at 08:49

## 2024-02-18 RX ADMIN — ENOXAPARIN SODIUM 40 MG: 40 INJECTION SUBCUTANEOUS at 08:46

## 2024-02-18 RX ADMIN — INSULIN LISPRO 2 UNITS: 100 INJECTION, SOLUTION INTRAVENOUS; SUBCUTANEOUS at 02:17

## 2024-02-18 RX ADMIN — Medication 1 APPLICATION: at 04:50

## 2024-02-18 RX ADMIN — VENLAFAXINE 25 MG: 25 TABLET ORAL at 12:00

## 2024-02-18 RX ADMIN — LORAZEPAM 0.5 MG: 2 INJECTION INTRAMUSCULAR; INTRAVENOUS at 15:33

## 2024-02-18 RX ADMIN — Medication 1 APPLICATION: at 14:47

## 2024-02-18 RX ADMIN — Medication 1 SPRAY: at 10:00

## 2024-02-18 RX ADMIN — METHYLPREDNISOLONE SODIUM SUCCINATE 125 MG: 125 INJECTION, POWDER, FOR SOLUTION INTRAMUSCULAR; INTRAVENOUS at 08:47

## 2024-02-18 RX ADMIN — INSULIN LISPRO 3 UNITS: 100 INJECTION, SOLUTION INTRAVENOUS; SUBCUTANEOUS at 05:54

## 2024-02-18 RX ADMIN — NYSTATIN: 100000 POWDER TOPICAL at 17:23

## 2024-02-18 RX ADMIN — Medication 1 APPLICATION: at 19:39

## 2024-02-18 RX ADMIN — Medication 1 APPLICATION: at 02:17

## 2024-02-18 RX ADMIN — TOPIRAMATE 100 MG: 100 TABLET, FILM COATED ORAL at 17:20

## 2024-02-18 RX ADMIN — INSULIN LISPRO 2 UNITS: 100 INJECTION, SOLUTION INTRAVENOUS; SUBCUTANEOUS at 08:46

## 2024-02-18 RX ADMIN — LAMOTRIGINE 150 MG: 100 TABLET ORAL at 17:12

## 2024-02-18 RX ADMIN — Medication 1 SPRAY: at 17:19

## 2024-02-18 RX ADMIN — METOLAZONE 2.5 MG: 2.5 TABLET ORAL at 11:00

## 2024-02-18 RX ADMIN — METOROPROLOL TARTRATE 2.5 MG: 5 INJECTION, SOLUTION INTRAVENOUS at 17:00

## 2024-02-18 RX ADMIN — VANCOMYCIN HYDROCHLORIDE 1750 MG: 1 INJECTION, POWDER, LYOPHILIZED, FOR SOLUTION INTRAVENOUS at 09:43

## 2024-02-18 RX ADMIN — LACTULOSE 20 G: 20 SOLUTION ORAL at 11:00

## 2024-02-18 RX ADMIN — Medication 1 SPRAY: at 16:00

## 2024-02-18 RX ADMIN — INSULIN LISPRO 2 UNITS: 100 INJECTION, SOLUTION INTRAVENOUS; SUBCUTANEOUS at 13:00

## 2024-02-18 RX ADMIN — INSULIN LISPRO 2 UNITS: 100 INJECTION, SOLUTION INTRAVENOUS; SUBCUTANEOUS at 14:47

## 2024-02-18 RX ADMIN — LACTULOSE 20 G: 20 SOLUTION ORAL at 17:00

## 2024-02-18 RX ADMIN — INSULIN LISPRO 1 UNITS: 100 INJECTION, SOLUTION INTRAVENOUS; SUBCUTANEOUS at 17:25

## 2024-02-18 RX ADMIN — CHLORHEXIDINE GLUCONATE 0.12% ORAL RINSE 15 ML: 1.2 LIQUID ORAL at 21:15

## 2024-02-18 RX ADMIN — Medication 1 SPRAY: at 23:55

## 2024-02-18 RX ADMIN — LAMOTRIGINE 150 MG: 100 TABLET ORAL at 08:47

## 2024-02-18 RX ADMIN — Medication 1 SPRAY: at 08:48

## 2024-02-18 RX ADMIN — Medication 1 APPLICATION: at 21:35

## 2024-02-18 NOTE — PROGRESS NOTES
Carla Hunt is a 59 y.o. female who is currently ordered Vancomycin IV with management by the Pharmacy Consult service.  Relevant clinical data and objective / subjective history reviewed.  Vancomycin Assessment:  Indication and Goal AUC/Trough: Pneumonia (goal -600, trough >10)  Clinical Status: worsening  Micro:     Renal Function:  SCr: 1.28 mg/dL  CrCl: 48 mL/min  Renal replacement: Not on dialysis  Days of Therapy: 1  Current Dose: 1750 mg IV load  Vancomycin Plan:  New Dosing: Following load, start 1000 mg IV q24h at 2100 on 2/18  Estimated AUC: 426 mcg*hr/mL  Estimated Trough: 12.6 mcg/mL  Next Level: 2/19 AM labs   Renal Function Monitoring: Daily BMP and UOP  Pharmacy will continue to follow closely for s/sx of nephrotoxicity, infusion reactions and appropriateness of therapy.  BMP and CBC will be ordered per protocol. We will continue to follow the patient’s culture results and clinical progress daily.    Shahriar Mane, Pharmacist

## 2024-02-18 NOTE — RESPIRATORY THERAPY NOTE
RT Ventilator Management Note  Carla Hunt 59 y.o. female MRN: [id number]  Unit/Bed#: [facility] 10 Encounter: [id number]      Daily Screen         2/17/2024 2011 2/18/2024  0740          Patient safety screen outcome:: Passed Passed (P)       Not Ready for Weaning due to:: Underline problem not resolved --                Physical Exam:   Respiratory Pattern: Assisted  Chest Assessment: Chest expansion symmetrical  Bilateral Breath Sounds: Diminished  Cough: Strong  Suction: ET Tube  O2 Device: vent      Resp Comments: Pt received on ASV. Tolerating well. FiO2 weaned per sat. No other changes. RT will cont to monitor.     02/18/24 0740   Respiratory Assessment   Resp Comments Pt received on ASV. Tolerating well. FiO2 weaned per sat. No other changes. RT will cont to monitor.   Vent Information   Vent ID [id number]   Vent type Summers G5   Summers Vent Mode ASV   $ Pulse Oximetry Spot Check Charge Completed   SpO2 94 %   ASV Settings   P-ramp (ms) 50 (ms)   P ASV Limit (cmH2O) 40 cmH2O   ETS (%) 40 %   FIO2 (%) 55 %   PEEP (cmH2O) 10 cmH2O   % MinVol 145 %   % MinVol Target 9.7 %   Flow Trigger (LPM)   (2cmh2o)   Humidification Heater   Heater Temp 87.8 °F (31 °C)   ASV Actuals   Resp Rate (BPM) 18 BPM   VT (mL) 650 mL   MV (Obs) 9.4   MAP (cmH2O) 13 cmH2O   Peak Pressure (cmH2O) 23 cmH2O   Pressure Support (cmH2O) 7 cmH2O   I:E Ratio (Obs) 1:2   Static Compliance (mL/cmH2O) 55 mL/cmH2O   Heater Temperature (Obs) 87.8 °F (31 °C)   ASV Alarms   High Peak Pressure (cmH2O) 50 cmH2O   Low Pressure (cmH2O) 5 cmH2O   High Resp Rate (BPM) 40 BPM   Low Resp Rate (BPM) 8 BPM   High MV (L/min) 20 L/min   Low MV (L/min) 3 L/min   High VT (mL) 1500 mL   Low VT (mL) 200 mL   Apnea Time (s) 20 S   Maintenance   Alarm (pink) cable attached No   Resuscitation bag with peep valve at bedside Yes   Water bag changed No   Circuit changed No   Daily Screen   Patient safety screen outcome: Passed   IHI Ventilator Associated Pneumonia  Bundle   Daily Assessment of Readiness to Extubate Yes   ETT  Cuffed 7.5 mm   Placement Date/Time: 02/13/24 (c) 3556   Type: Cuffed  Tube Size: 7.5 mm  Location: Oral  Insertion attempts: 1  Placement Verification: End tidal CO2;Symmetrical chest wall movement  Secured at (cm): 23   Secured at (cm) 23   Measured from Lips   Secured Location Right   Repositioned Center to Right   Secured by Commercial tube escudero   Site Condition Dry   Cuff Pressure (cm H2O)   (mlt)   HI-LO Suction  Intermittent suction   HI-LO Secretions Scant   HI-LO Intervention Patent

## 2024-02-18 NOTE — CONSULTS
Vancomycin IV Pharmacy-to-Dose Consultation    Carla Hunt is a 59 y.o. female who was receiving Vancomycin IV with management by the Pharmacy Consult service for treatment of Pneumonia (goal -600, trough >10)  .       The patient’s Vancomycin therapy has been completed / discontinued. Thank you for allowing us to take part in this patient's care. Pharmacy will sign-off now; please call or re-consult if there are any questions.        Shahriar Mane, ShilpaD

## 2024-02-18 NOTE — PROGRESS NOTES
[facility]  Progress Note: Critical Care  Name: Carla Hunt 59 y.o. 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  Hyponatremia  B cell lymphoma  CKD stage III  Seizure disorder  Anxiety     Neuro:   Sedation: Off propofol, now on precedex and dilaudid drip     Diagnosis: seizure disorder  -S/p partial left temporal lobe resection in 2007  -Contuinue home lamictal 150 bid and topamax 100 bid     Diagnosis: Anxiety  -On venlafaxine 25 mg TID via NGT  -Seroquel 12.5 hs and melatonin 6 hs for sleep     CV:   No active issues     Pulm:  Diagnosis: Acute hypoxic respiratory failure due to severe covid  and covid induced fibrosis  -Tested COVID positive on 1/7  -Completed severe COVID pathway and 7 days CTX  -Tenuous respiratory status requiring multiple re-admissions to MICU  -S/p Bronch 2/2   -NG on cultures (initially showed non-group A strep)  -PCP and AFB negative   -Legionella, viral, fungal cultures no growth to date  -Pulse dose steroids with solumedrol 1g daily x3 days (completed 2/7) then transitioned to prednisone 80mg daily on 2/8  -Vent settings: APVcmv 14/400/8/60%  -Currently on solumedrol 60 mg Q12H > 2/17 incr to solumedrol 125mg Q12  -CRP trend 15.8 (02/14) > 8.3 (02/15) > 35.9 (02/16) > 288 (02/17). Appears to related to steroid taper more so than IVIG. While it maybe inflammatory, it can also be infectious  -pt re-started on solumedrol 125mg BID on 2/17/24  -Off veletri  -Completed IVIG for 5 days  -bactrim DS Q8hrs (day 9 of 14) per ID recs; monitor Cr  -ID contacted micro lab for additional susceptibilities to levaquin/minocycline  -added vancomycin + cefepime today 2/18/24  -follow bronch cultures (2/16 and 2/17)  -follow UA w/cultures (ordered today 2/18)     GI:   -Bowel regimen  with daily MiraLAX and twice daily senna  -Can start GI prophylaxis with protonix 40 mg   -Tube feeds running, holding frequent water flushes due to hyponatremia  -no BM's yet, will add lactulose today     :   Diagnosis: CKD III  -Baseline Cr appears to be 0.8-1.2  -UA unremarkable   -Upon chart review 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) though unclear how this was diagnosed, this also does not enirely fit as she is NOT hypokalemic  -Continue to monitor I/O and UOP  -sCr slowly climbing up, likely due to bactrim, we are working with ID to see if patient can be transitioned to levaquin or minocycline  -goal of net negative 0.5-1L -> currently net +507, started metolazone + bumex today     Diagnosis: Hyponatremia, likely resolved  -Noted to have sodium 133>129>128>137 today  -Urine osmolality, urine sodium and osmolality reviewed  -Osmolality serum- 302 (elevated), IVIG is known to cause this  -Will continue to monitor this     F/E/N:   F: none, boluses as indicated   E: monitor and replete for goal K>4, P>3, Mg>2  N: Tube feeds      Heme/Onc:   Diagnosis: B cell lymphoma  -Follows with heme/onc at [facility]  -On rituxan for 2 year course, started May 2022  -Last dose was 12/20, treatment currently on hold   -Leukopenic on admission but WBC now wnl     DVT ppx with lovenox      Endo:   Diagnosis: steroid hyperglycemia and diabetes mellitus type 2, A1c at 6.6 from 01/2024  -SSI, hypoglycemic protocol  -On SSI #2  -On tube feeds, glucerna  -Goal -180, ranges 164-256  -Added lispro Q6H 2units on 2/17  -BG range last 24hrs 193-280, in the setting of increased steroids -> 18U lispro used last 24hrs, will end lispro Q6hrs 2U tonight, add lantus 9U starting tonight     ID:   Diagnosis: Severe covid pneumonia and stenotrophomonas pneumonia  -Completed severe COVID pathway with decadron (ended 1/22) and remdesivir (ended 1/20), also completed 7 days CTX on 1/15  -C/f opportunistic infections given  immunocompromised status on chemotherapy and recent steroid use  -No consolidations on CXR and procal negative  -S/p bactrim and minocycline x5 days for stenotrophomonas on sputum culture (1/25), then on bactrim for PJP ppx  -Bronc on 2/2 - Cx w/o growth (initially resulted as 1+ alpha hemolytic strep), AFB & PCP negative, f/u fungal, legionella, and viral cultures   -2/10 pt again had escalating O2 requirements on floors necessitating readmission to ICU  -Concern that with recent pulse dose steroids and now worsening respiratory status she could have infection, possibly opportunistic   -Repeat sputum culture 2/9 with 4+ stenotrophomonas  -On high dose bactrim, day 9 today. Plan to treat for a 14 day course.         MSK/Skin:   -Wound care team following for bilateral sacral wounds        Disposition: Critical care    ICU Core Measures     Vented Patient  VAP Bundle  VAP bundle ordered     A: Assess, Prevent, and Manage Pain Has pain been assessed? Yes  Need for changes to pain regimen? No   B: Both Spontaneous Awakening Trials (SATs) and Spontaneous Breathing Trials (SBTs) Plan to perform spontaneous awakening trial today? Yes   Plan to perform spontaneous breathing trial today? Yes   Obvious barriers to extubation? Yes   C: Choice of Sedation RASS Goal: -2 Light Sedation  Need for changes to sedation or analgesia regimen? No   D: Delirium CAM-ICU: currently sedated and intubated   E: Early Mobility  Plan for early mobility? Yes   F: Family Engagement Plan for family engagement today? Yes       Antibiotic Review: Continue broad spectrum secondary to severity of illness.  and Infectious disease consulted    Review of Invasive Devices:    Diana Plan: Continue for accurate I/O monitoring for 48 hours    Amelia Plan: Keep arterial line for hemodynamic monitoring, frequent ABGs, and frequent labs    Prophylaxis:  VTE VTE covered by:  enoxaparin, Subcutaneous, 40 mg at 02/17/24 0830       Stress Ulcer  covered  byfamotidine (PEPCID) oral suspension 20 mg [[id number]]        Significant 24hr Events     24hr events: overnight had a temperature of 101.3. Blood cultures were redrawn. Pt did have HR in 160s and blood pressure drop after being snowed with increased precedex and dilaudid. Levo was increased, this AM turned off. Precedex and dilaudid were also held. No desaturation, currently ventilated at FiO2 of 80%. Changed this AM to VC+ settings.     Subjective     Review of Systems   Reason unable to perform ROS: Currently intubated and sedated. Minimally responsive but arousable.        Objective                            Vitals I/O      Most Recent Min/Max in 24hrs   Temp (!) 101.3 °F (38.5 °C) Temp  Min: 97.9 °F (36.6 °C)  Max: 101.3 °F (38.5 °C)   Pulse (!) 122 Pulse  Min: 80  Max: 141   Resp 17 Resp  Min: 6  Max: 23   BP 90/50 No data recorded   O2 Sat 92 % SpO2  Min: 89 %  Max: 95 %      Intake/Output Summary (Last 24 hours) at 2/17/2024 2351  Last data filed at 2/17/2024 1800  Gross per 24 hour   Intake 1618 ml   Output 1360 ml   Net 258 ml       Diet Enteral/Parenteral; Tube Feeding No Oral Diet; Glucerna 1.2; Continuous; 60; 50; Every 6 hours    Invasive Monitoring           Physical Exam   Physical Exam  Vitals reviewed.   Eyes:      General: No scleral icterus.        Right eye: No discharge.         Left eye: No discharge.      Conjunctiva/sclera: Conjunctivae normal.   Skin:     General: Skin is warm.   HENT:      Head: Normocephalic and atraumatic.      Nose:      Comments: Dried blood around external nares. No active epistaxis.  Cardiovascular:      Rate and Rhythm: Normal rate and regular rhythm.   Musculoskeletal:      Right lower leg: No edema.      Left lower leg: No edema.   Abdominal: General: There is no distension.      Palpations: Abdomen is soft.      Tenderness: There is no abdominal tenderness. There is no guarding.      Hernia: No hernia is present.   Constitutional:       General: She is not in  acute distress.     Interventions: She is intubated.   Pulmonary:      Effort: No respiratory distress. She is intubated.      Comments: Currently intubated. Lungs sound on auscultation.  Neurological:      Mental Status: She is alert.   Genitourinary/Anorectal:  Ortiz present.          Diagnostic Studies      EKG: no new EKG's  Imaging:  I have personally reviewed pertinent reports.       Medications:  Scheduled PRN   chlorhexidine, 15 mL, Q12H SARTHAK  enoxaparin, 40 mg, Daily  famotidine, 20 mg, BID  insulin lispro, 1-5 Units, Q6H SARTHAK  insulin lispro, 2 Units, Q6H  lamoTRIgine, 150 mg, BID  methylPREDNISolone sodium succinate, 125 mg, Q12H SARTHAK  norepinephrine, ,   nystatin, , BID  polyethylene glycol, 17 g, Daily  QUEtiapine, 12.5 mg, HS  sodium chloride, 1 Application, Q2H  sodium chloride, 1 spray, Q2H  sulfamethoxazole-trimethoprim, 5 mg/kg of trimethoprim, TID  topiramate, 100 mg, BID  venlafaxine, 25 mg, TID With Meals      acetaminophen, 650 mg, Q6H PRN  bisacodyl, 10 mg, Daily PRN  calcium carbonate, 500 mg, BID PRN  HYDROmorphone, 0.5 mg, Q1H PRN  LORazepam, 0.5 mg, Q6H PRN  metoprolol, 2.5 mg, Q6H PRN  norepinephrine, ,   ondansetron, 4 mg, Q6H PRN  senna-docusate sodium, 2 tablet, BID PRN       Continuous    dexmedetomidine, 1.2 mcg/kg/hr, Last Rate: 1.2 mcg/kg/hr (02/17/24 2152)  HYDROmorphone, 1 mg/hr, Last Rate: 1 mg/hr (02/17/24 2114)  norepinephrine, 1-30 mcg/min, Last Rate: 5 mcg/min (02/17/24 2212)         Labs:    CBC    Recent Labs     02/16/24  0432 02/17/24  0511   WBC 20.62* 22.93*   HGB 10.2* 9.5*   HCT 28.7* 28.3*    268   BANDSPCT  --  1     BMP    Recent Labs     02/16/24  0432 02/17/24  0511   SODIUM 128* 137   K 4.8 5.1    106   CO2 18* 20*   AGAP 9 11   BUN 42* 71*   CREATININE 1.22 1.28   CALCIUM 9.7 10.7*       Coags    No recent results     Additional Electrolytes  Recent Labs     02/16/24  0432 02/17/24  0511   MG 2.2 2.7          Blood Gas    Recent Labs      02/16/24  0756   PHART 7.351   YYP3IQD 37.0   PO2ART 119.3   [id number] 20.0*   BEART -5.0   SOURCE Line, Arterial     Recent Labs     02/16/24  0756   SOURCE Line, Arterial    LFTs  Recent Labs     02/16/24  1833   ALT 24   AST 17   ALKPHOS 72   ALB 3.1*   TBILI 0.68       Infectious  Recent Labs     02/17/24  0908   PROCALCITONI 0.55*     Glucose  Recent Labs     02/16/24  0432 02/17/24  0511   GLUC 142* 247*                   Usha Mays, DO

## 2024-02-19 LAB
ANION GAP SERPL CALCULATED.3IONS-SCNC: 12 MMOL/L
ANION GAP SERPL CALCULATED.3IONS-SCNC: 8 MMOL/L
ATRIAL RATE: 135 BPM
ATRIAL RATE: 142 BPM
BASE EX.OXY STD BLDV CALC-SCNC: 96.1 % (ref 60–80)
BASE EXCESS BLDV CALC-SCNC: -2.5 MMOL/L
BUN SERPL-MCNC: 102 MG/DL (ref 5–25)
BUN SERPL-MCNC: 107 MG/DL (ref 5–25)
CALCIUM SERPL-MCNC: 10.6 MG/DL (ref 8.4–10.2)
CALCIUM SERPL-MCNC: 11.3 MG/DL (ref 8.4–10.2)
CHLORIDE SERPL-SCNC: 106 MMOL/L (ref 96–108)
CHLORIDE SERPL-SCNC: 110 MMOL/L (ref 96–108)
CO2 SERPL-SCNC: 21 MMOL/L (ref 21–32)
CO2 SERPL-SCNC: 24 MMOL/L (ref 21–32)
CREAT SERPL-MCNC: 1.69 MG/DL (ref 0.6–1.3)
CREAT SERPL-MCNC: 1.81 MG/DL (ref 0.6–1.3)
CREAT UR-MCNC: 26.1 MG/DL
CRP SERPL QL: 116.9 MG/L
ERYTHROCYTE [DISTWIDTH] IN BLOOD BY AUTOMATED COUNT: 19.8 % (ref 11.6–15.1)
FUNGUS SPEC CULT: NORMAL
FUNGUS SPEC CULT: NORMAL
GFR SERPL CREATININE-BSD FRML MDRD: 30 ML/MIN/1.73SQ M
GFR SERPL CREATININE-BSD FRML MDRD: 33 ML/MIN/1.73SQ M
GLUCOSE SERPL-MCNC: 130 MG/DL (ref 65–140)
GLUCOSE SERPL-MCNC: 154 MG/DL (ref 65–140)
GLUCOSE SERPL-MCNC: 157 MG/DL (ref 65–140)
GLUCOSE SERPL-MCNC: 159 MG/DL (ref 65–140)
GLUCOSE SERPL-MCNC: 185 MG/DL (ref 65–140)
GLUCOSE SERPL-MCNC: 223 MG/DL (ref 65–140)
GLUCOSE SERPL-MCNC: 233 MG/DL (ref 65–140)
GLUCOSE SERPL-MCNC: 263 MG/DL (ref 65–140)
GLUCOSE SERPL-MCNC: 307 MG/DL (ref 65–140)
GLUCOSE SERPL-MCNC: 330 MG/DL (ref 65–140)
GLUCOSE SERPL-MCNC: 397 MG/DL (ref 65–140)
HCO3 BLDV-SCNC: 21.9 MMOL/L (ref 24–30)
HCT VFR BLD AUTO: 25.1 % (ref 34.8–46.1)
HGB BLD-MCNC: 8.3 G/DL (ref 11.5–15.4)
MAGNESIUM SERPL-MCNC: 2.9 MG/DL (ref 1.9–2.7)
MCH RBC QN AUTO: 30.2 PG (ref 26.8–34.3)
MCHC RBC AUTO-ENTMCNC: 33.1 G/DL (ref 31.4–37.4)
MCV RBC AUTO: 91 FL (ref 82–98)
MRSA NOSE QL CULT: NORMAL
NRBC BLD AUTO-RTO: 1 /100 WBCS
O2 CT BLDV-SCNC: 11.3 ML/DL
OSMOLALITY UR/SERPL-RTO: 355 MMOL/KG (ref 282–298)
OSMOLALITY UR/SERPL-RTO: 355 MMOL/KG (ref 282–298)
OSMOLALITY UR/SERPL-RTO: 357 MMOL/KG (ref 282–298)
OSMOLALITY UR/SERPL-RTO: 365 MMOL/KG (ref 282–298)
OSMOLALITY UR/SERPL-RTO: 365 MMOL/KG (ref 282–298)
OSMOLALITY UR/SERPL-RTO: 366 MMOL/KG (ref 282–298)
P AXIS: 55 DEGREES
P AXIS: 57 DEGREES
PCO2 BLDV: 35.8 MM HG (ref 42–50)
PH BLDV: 7.4 [PH] (ref 7.3–7.4)
PLATELET # BLD AUTO: 349 THOUSANDS/UL (ref 149–390)
PMV BLD AUTO: 11.5 FL (ref 8.9–12.7)
PO2 BLDV: 107.6 MM HG (ref 35–45)
POTASSIUM SERPL-SCNC: 5.8 MMOL/L (ref 3.5–5.3)
POTASSIUM SERPL-SCNC: 5.9 MMOL/L (ref 3.5–5.3)
PR INTERVAL: 134 MS
PR INTERVAL: 136 MS
QRS AXIS: 17 DEGREES
QRS AXIS: 24 DEGREES
QRSD INTERVAL: 68 MS
QRSD INTERVAL: 72 MS
QT INTERVAL: 258 MS
QT INTERVAL: 260 MS
QTC INTERVAL: 387 MS
QTC INTERVAL: 399 MS
RBC # BLD AUTO: 2.75 MILLION/UL (ref 3.81–5.12)
SARS-COV-2 RNA SPEC QL NAA+PROBE: DETECTED
SODIUM SERPL-SCNC: 139 MMOL/L (ref 135–147)
SODIUM SERPL-SCNC: 142 MMOL/L (ref 135–147)
T WAVE AXIS: -4 DEGREES
T WAVE AXIS: 42 DEGREES
TRIGL SERPL-MCNC: 362 MG/DL
UUN 24H UR-MCNC: 830 MG/DL
VANCOMYCIN SERPL-MCNC: 42.8 UG/ML (ref 10–20)
VENTRICULAR RATE: 135 BPM
VENTRICULAR RATE: 142 BPM
WBC # BLD AUTO: 29.45 THOUSAND/UL (ref 4.31–10.16)

## 2024-02-19 PROCEDURE — 82570 ASSAY OF URINE CREATININE: CPT

## 2024-02-19 PROCEDURE — 93010 ELECTROCARDIOGRAM REPORT: CPT | Performed by: INTERNAL MEDICINE

## 2024-02-19 PROCEDURE — 85027 COMPLETE CBC AUTOMATED: CPT | Performed by: STUDENT IN AN ORGANIZED HEALTH CARE EDUCATION/TRAINING PROGRAM

## 2024-02-19 PROCEDURE — 82948 REAGENT STRIP/BLOOD GLUCOSE: CPT

## 2024-02-19 PROCEDURE — 83930 ASSAY OF BLOOD OSMOLALITY: CPT | Performed by: STUDENT IN AN ORGANIZED HEALTH CARE EDUCATION/TRAINING PROGRAM

## 2024-02-19 PROCEDURE — 82805 BLOOD GASES W/O2 SATURATION: CPT | Performed by: STUDENT IN AN ORGANIZED HEALTH CARE EDUCATION/TRAINING PROGRAM

## 2024-02-19 PROCEDURE — 83735 ASSAY OF MAGNESIUM: CPT | Performed by: STUDENT IN AN ORGANIZED HEALTH CARE EDUCATION/TRAINING PROGRAM

## 2024-02-19 PROCEDURE — 93005 ELECTROCARDIOGRAM TRACING: CPT

## 2024-02-19 PROCEDURE — 80202 ASSAY OF VANCOMYCIN: CPT | Performed by: STUDENT IN AN ORGANIZED HEALTH CARE EDUCATION/TRAINING PROGRAM

## 2024-02-19 PROCEDURE — 80048 BASIC METABOLIC PNL TOTAL CA: CPT | Performed by: STUDENT IN AN ORGANIZED HEALTH CARE EDUCATION/TRAINING PROGRAM

## 2024-02-19 PROCEDURE — 99291 CRITICAL CARE FIRST HOUR: CPT | Performed by: INTERNAL MEDICINE

## 2024-02-19 PROCEDURE — 94760 N-INVAS EAR/PLS OXIMETRY 1: CPT

## 2024-02-19 PROCEDURE — 94003 VENT MGMT INPAT SUBQ DAY: CPT

## 2024-02-19 PROCEDURE — 86140 C-REACTIVE PROTEIN: CPT

## 2024-02-19 PROCEDURE — 99233 SBSQ HOSP IP/OBS HIGH 50: CPT | Performed by: INTERNAL MEDICINE

## 2024-02-19 PROCEDURE — 84540 ASSAY OF URINE/UREA-N: CPT

## 2024-02-19 RX ORDER — ACETAMINOPHEN 325 MG/1
975 TABLET ORAL EVERY 8 HOURS PRN
Status: DISCONTINUED | OUTPATIENT
Start: 2024-02-19 | End: 2024-02-19

## 2024-02-19 RX ORDER — HYDROMORPHONE HCL/PF 1 MG/ML
0.5 SYRINGE (ML) INJECTION
Status: DISCONTINUED | OUTPATIENT
Start: 2024-02-19 | End: 2024-03-01

## 2024-02-19 RX ORDER — INSULIN LISPRO 100 [IU]/ML
4 INJECTION, SOLUTION INTRAVENOUS; SUBCUTANEOUS EVERY 6 HOURS
Status: DISCONTINUED | OUTPATIENT
Start: 2024-02-19 | End: 2024-02-19

## 2024-02-19 RX ORDER — INSULIN GLARGINE 100 [IU]/ML
12 INJECTION, SOLUTION SUBCUTANEOUS
Status: DISCONTINUED | OUTPATIENT
Start: 2024-02-19 | End: 2024-02-19

## 2024-02-19 RX ORDER — SODIUM CHLORIDE, SODIUM GLUCONATE, SODIUM ACETATE, POTASSIUM CHLORIDE, MAGNESIUM CHLORIDE, SODIUM PHOSPHATE, DIBASIC, AND POTASSIUM PHOSPHATE .53; .5; .37; .037; .03; .012; .00082 G/100ML; G/100ML; G/100ML; G/100ML; G/100ML; G/100ML; G/100ML
500 INJECTION, SOLUTION INTRAVENOUS ONCE
Status: COMPLETED | OUTPATIENT
Start: 2024-02-19 | End: 2024-02-19

## 2024-02-19 RX ORDER — ACETAMINOPHEN 10 MG/ML
1000 INJECTION, SOLUTION INTRAVENOUS EVERY 8 HOURS SCHEDULED
Status: DISCONTINUED | OUTPATIENT
Start: 2024-02-19 | End: 2024-02-22

## 2024-02-19 RX ORDER — ALBUMIN, HUMAN INJ 5% 5 %
25 SOLUTION INTRAVENOUS ONCE
Status: CANCELLED | OUTPATIENT
Start: 2024-02-19 | End: 2024-02-19

## 2024-02-19 RX ORDER — LEVOFLOXACIN 5 MG/ML
750 INJECTION, SOLUTION INTRAVENOUS
Status: DISCONTINUED | OUTPATIENT
Start: 2024-02-19 | End: 2024-02-22

## 2024-02-19 RX ORDER — ALBUMIN, HUMAN INJ 5% 5 %
25 SOLUTION INTRAVENOUS ONCE
Status: COMPLETED | OUTPATIENT
Start: 2024-02-19 | End: 2024-02-19

## 2024-02-19 RX ORDER — VANCOMYCIN HYDROCHLORIDE 1 G/200ML
1000 INJECTION, SOLUTION INTRAVENOUS DAILY PRN
Status: DISCONTINUED | OUTPATIENT
Start: 2024-02-19 | End: 2024-02-19

## 2024-02-19 RX ORDER — SODIUM CHLORIDE, SODIUM GLUCONATE, SODIUM ACETATE, POTASSIUM CHLORIDE, MAGNESIUM CHLORIDE, SODIUM PHOSPHATE, DIBASIC, AND POTASSIUM PHOSPHATE .53; .5; .37; .037; .03; .012; .00082 G/100ML; G/100ML; G/100ML; G/100ML; G/100ML; G/100ML; G/100ML
100 INJECTION, SOLUTION INTRAVENOUS CONTINUOUS
Status: DISCONTINUED | OUTPATIENT
Start: 2024-02-19 | End: 2024-02-20

## 2024-02-19 RX ADMIN — VENLAFAXINE 25 MG: 25 TABLET ORAL at 16:26

## 2024-02-19 RX ADMIN — HYDROMORPHONE HYDROCHLORIDE 0.5 MG: 1 INJECTION, SOLUTION INTRAMUSCULAR; INTRAVENOUS; SUBCUTANEOUS at 21:27

## 2024-02-19 RX ADMIN — HYDROMORPHONE HYDROCHLORIDE 0.5 MG: 1 INJECTION, SOLUTION INTRAMUSCULAR; INTRAVENOUS; SUBCUTANEOUS at 06:09

## 2024-02-19 RX ADMIN — Medication 1 SPRAY: at 06:24

## 2024-02-19 RX ADMIN — TOPIRAMATE 100 MG: 100 TABLET, FILM COATED ORAL at 08:06

## 2024-02-19 RX ADMIN — CHLORHEXIDINE GLUCONATE 0.12% ORAL RINSE 15 ML: 1.2 LIQUID ORAL at 08:00

## 2024-02-19 RX ADMIN — Medication 1 APPLICATION: at 21:31

## 2024-02-19 RX ADMIN — NYSTATIN: 100000 POWDER TOPICAL at 08:05

## 2024-02-19 RX ADMIN — SODIUM CHLORIDE, SODIUM GLUCONATE, SODIUM ACETATE, POTASSIUM CHLORIDE, MAGNESIUM CHLORIDE, SODIUM PHOSPHATE, DIBASIC, AND POTASSIUM PHOSPHATE 500 ML: .53; .5; .37; .037; .03; .012; .00082 INJECTION, SOLUTION INTRAVENOUS at 07:47

## 2024-02-19 RX ADMIN — POLYETHYLENE GLYCOL 3350 17 G: 17 POWDER, FOR SOLUTION ORAL at 08:02

## 2024-02-19 RX ADMIN — NYSTATIN: 100000 POWDER TOPICAL at 17:57

## 2024-02-19 RX ADMIN — LAMOTRIGINE 150 MG: 100 TABLET ORAL at 17:03

## 2024-02-19 RX ADMIN — BISACODYL 10 MG: 10 SUPPOSITORY RECTAL at 21:13

## 2024-02-19 RX ADMIN — Medication 1 APPLICATION: at 08:05

## 2024-02-19 RX ADMIN — SODIUM CHLORIDE, SODIUM GLUCONATE, SODIUM ACETATE, POTASSIUM CHLORIDE, MAGNESIUM CHLORIDE, SODIUM PHOSPHATE, DIBASIC, AND POTASSIUM PHOSPHATE 100 ML/HR: .53; .5; .37; .037; .03; .012; .00082 INJECTION, SOLUTION INTRAVENOUS at 20:48

## 2024-02-19 RX ADMIN — ACETAMINOPHEN 650 MG: 325 TABLET, FILM COATED ORAL at 06:09

## 2024-02-19 RX ADMIN — LAMOTRIGINE 150 MG: 100 TABLET ORAL at 08:02

## 2024-02-19 RX ADMIN — KETAMINE HYDROCHLORIDE 0.5 MG/KG/HR: 100 INJECTION INTRAMUSCULAR; INTRAVENOUS at 21:46

## 2024-02-19 RX ADMIN — ALBUMIN (HUMAN) 25 G: 12.5 INJECTION, SOLUTION INTRAVENOUS at 13:51

## 2024-02-19 RX ADMIN — CHLORHEXIDINE GLUCONATE 0.12% ORAL RINSE 15 ML: 1.2 LIQUID ORAL at 20:48

## 2024-02-19 RX ADMIN — Medication 1 APPLICATION: at 02:37

## 2024-02-19 RX ADMIN — Medication 1 APPLICATION: at 16:25

## 2024-02-19 RX ADMIN — Medication 1 SPRAY: at 17:57

## 2024-02-19 RX ADMIN — Medication 1 SPRAY: at 21:30

## 2024-02-19 RX ADMIN — Medication 1 SPRAY: at 02:37

## 2024-02-19 RX ADMIN — ENOXAPARIN SODIUM 40 MG: 40 INJECTION SUBCUTANEOUS at 08:01

## 2024-02-19 RX ADMIN — LORAZEPAM 0.5 MG: 2 INJECTION INTRAMUSCULAR; INTRAVENOUS at 17:00

## 2024-02-19 RX ADMIN — LACTULOSE 20 G: 20 SOLUTION ORAL at 08:02

## 2024-02-19 RX ADMIN — Medication 1 SPRAY: at 11:33

## 2024-02-19 RX ADMIN — Medication 1 SPRAY: at 23:55

## 2024-02-19 RX ADMIN — SENNOSIDES, DOCUSATE SODIUM 2 TABLET: 8.6; 5 TABLET ORAL at 20:48

## 2024-02-19 RX ADMIN — Medication 1 APPLICATION: at 11:33

## 2024-02-19 RX ADMIN — Medication 1 APPLICATION: at 13:52

## 2024-02-19 RX ADMIN — Medication 1 APPLICATION: at 17:58

## 2024-02-19 RX ADMIN — SODIUM CHLORIDE, SODIUM GLUCONATE, SODIUM ACETATE, POTASSIUM CHLORIDE, MAGNESIUM CHLORIDE, SODIUM PHOSPHATE, DIBASIC, AND POTASSIUM PHOSPHATE 500 ML: .53; .5; .37; .037; .03; .012; .00082 INJECTION, SOLUTION INTRAVENOUS at 11:29

## 2024-02-19 RX ADMIN — Medication 1 SPRAY: at 13:52

## 2024-02-19 RX ADMIN — LEVOFLOXACIN 750 MG: 750 INJECTION, SOLUTION INTRAVENOUS at 09:51

## 2024-02-19 RX ADMIN — HYDROMORPHONE HYDROCHLORIDE 0.5 MG: 1 INJECTION, SOLUTION INTRAMUSCULAR; INTRAVENOUS; SUBCUTANEOUS at 10:47

## 2024-02-19 RX ADMIN — SULFAMETHOXAZOLE AND TRIMETHOPRIM 2.5 TABLET: 800; 160 TABLET ORAL at 08:06

## 2024-02-19 RX ADMIN — Medication 1 SPRAY: at 09:51

## 2024-02-19 RX ADMIN — VENLAFAXINE 25 MG: 25 TABLET ORAL at 11:33

## 2024-02-19 RX ADMIN — Medication 1 SPRAY: at 08:05

## 2024-02-19 RX ADMIN — Medication 1 SPRAY: at 16:25

## 2024-02-19 RX ADMIN — QUETIAPINE FUMARATE 12.5 MG: 25 TABLET ORAL at 21:30

## 2024-02-19 RX ADMIN — METOLAZONE 2.5 MG: 2.5 TABLET ORAL at 08:03

## 2024-02-19 RX ADMIN — Medication 1 SPRAY: at 20:48

## 2024-02-19 RX ADMIN — FAMOTIDINE 20 MG: 40 POWDER, FOR SUSPENSION ORAL at 08:03

## 2024-02-19 RX ADMIN — FAMOTIDINE 20 MG: 40 POWDER, FOR SUSPENSION ORAL at 17:03

## 2024-02-19 RX ADMIN — Medication 1 APPLICATION: at 06:24

## 2024-02-19 RX ADMIN — ACETAMINOPHEN 1000 MG: 10 INJECTION INTRAVENOUS at 21:29

## 2024-02-19 RX ADMIN — SODIUM CHLORIDE 7 UNITS/HR: 9 INJECTION, SOLUTION INTRAVENOUS at 09:12

## 2024-02-19 RX ADMIN — Medication 1 APPLICATION: at 23:55

## 2024-02-19 RX ADMIN — SENNOSIDES, DOCUSATE SODIUM 2 TABLET: 8.6; 5 TABLET ORAL at 08:02

## 2024-02-19 RX ADMIN — Medication 1 APPLICATION: at 20:48

## 2024-02-19 RX ADMIN — ACETAMINOPHEN 1000 MG: 10 INJECTION INTRAVENOUS at 13:21

## 2024-02-19 RX ADMIN — Medication 1 APPLICATION: at 04:53

## 2024-02-19 RX ADMIN — LACTULOSE 20 G: 20 SOLUTION ORAL at 20:48

## 2024-02-19 RX ADMIN — VENLAFAXINE 25 MG: 25 TABLET ORAL at 08:04

## 2024-02-19 RX ADMIN — METHYLPREDNISOLONE SODIUM SUCCINATE 125 MG: 125 INJECTION, POWDER, FOR SOLUTION INTRAMUSCULAR; INTRAVENOUS at 08:02

## 2024-02-19 RX ADMIN — Medication 1 APPLICATION: at 09:51

## 2024-02-19 RX ADMIN — METHYLPREDNISOLONE SODIUM SUCCINATE 125 MG: 125 INJECTION, POWDER, FOR SOLUTION INTRAMUSCULAR; INTRAVENOUS at 20:48

## 2024-02-19 RX ADMIN — Medication 1 SPRAY: at 04:53

## 2024-02-19 RX ADMIN — LACTULOSE 20 G: 20 SOLUTION ORAL at 16:25

## 2024-02-19 RX ADMIN — INSULIN LISPRO 5 UNITS: 100 INJECTION, SOLUTION INTRAVENOUS; SUBCUTANEOUS at 05:58

## 2024-02-19 RX ADMIN — TOPIRAMATE 100 MG: 100 TABLET, FILM COATED ORAL at 17:03

## 2024-02-19 NOTE — CASE MANAGEMENT
Case Management Discharge Planning Note    Patient name Carla Hunt  Location MICU 10/MICU 10 MRN [id number]  : 1965 Date 2024       Current Admission Date: 1/10/2024  Current Admission Diagnosis:Acute respiratory failure with hypoxia (HCC)   Patient Active Problem List    Diagnosis Date Noted    Palliative care patient 2024    Goals of care, counseling/discussion 2024    Hypotension 2024    Pneumonia of both lungs due to infectious organism 2024    Seizure disorder ([facility]) 2024    Sepsis ([facility]) 2024    Acute respiratory failure with hypoxia ([facility]) 2024    Transaminitis 2024    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    COVID 2024    Stage 3b chronic kidney disease (CKD) ([facility]) 2024    Abnormal CT of the head 2024    Nephrolithiasis 2021    Other specified anxiety disorders 2019    Reactive depression 2019    Hidradenitis suppurativa of left axilla 2019    Anxiety state 2018    Luetscher's syndrome 2018    Disorder of parathyroid gland ([facility]) 2018    Eczema 2018    Gastroenteritis 2018    Grand mal status ([facility]) 2018    Hypercalcemia 2018    Hypertensive disorder 2018    Impacted cerumen 2018    Open wound of hand except fingers 2018    Otitis externa 2018    Overweight 2018    Pain in face 2018    Skin sensation disturbance 2018    Subjective visual disturbance 2018    Encounter for gynecological examination (general) (routine) without abnormal findings 10/23/2018    Long term current use of hormonal contraceptive 10/23/2018    Rosacea 2015      LOS (days): 40  Geometric Mean LOS (GMLOS) (days):   Days to GMLOS:     OBJECTIVE:  Risk of Unplanned Readmission Score: 32.18         Current admission status: Inpatient   Preferred Pharmacy:   [id number] [place] - [address]  [address] [facility]  [place] [facility] [address]  Phone: [phone] Fax: [phone]    [facility] - [place], [place] - [address] [facility]  [address] [facility] [address]  Phone: [phone] Fax: [phone]    Primary Care Provider: Tony Guevara MD    Primary Insurance: [employer]  Secondary Insurance: MEDICARE    DISCHARGE DETAILS:                                                                                                 Additional Comments: 58yo female with post Covid symptoms, seizure disorder, stroke-like symptoms. Currently on vent, FiO2 55%, on Precedex, Dilaudid, Insulin and Levophed gtts.Febrile today. Palliative care following. Family at bedside, CM available as needed.

## 2024-02-19 NOTE — PROGRESS NOTES
Progress Note - Infectious Disease   Carla Hunt 59 y.o. female MRN: [id number]  [facility]: [id number]      Impression/Plan:  1. Recurrent acute hypoxic respiratory failure.  Initially in January felt to be due to COVID and patient was treated with antiviral and for concurrent bacterial pneumonia.  Clinically improved.  Deteriorated in late January and CT concerning for fibrosis.  Procalcitonin negative x 3.  Improved primarily with steroids.  She did receive 5 days of Bactrim/minocycline for presumptive respiratory infection and stenotrophomonas on cultures.  Subsequent BAL/bronchoscopy cultures were negative for bacterial growth, AFB and PCP.  Patient had been improving until 2 weeks ago when she deteriorated again.  Expectorated sputum showed stenotrophomonas.  CT again showing fibrotic changes without consolidation.  Organisms isolated may represent colonization at this point.  Patient remains on high-dose steroid along with Bactrim.  She is currently intubated.  Subsequent cultures now have only isolated Candida.  Patient now with ELIESER, hyperkalemia and fevers and leukocytosis which I think are due to Bactrim and possibly Precedex.  Recent EKG reviewed and QTc unremarkable and requested susceptibilities reviewed.  Discontinue further Bactrim  Discontinue cefepime/vancomycin  We will switch to renally dosed Levaquin  Complete 14 days of therapy through 2/23  Last doses of antibiotics ordered  Consider discontinuing Precedex  Continue high-dose systemic corticosteroid per critical service  Trend fever curve/vitals  Repeat chemistry tomorrow to monitor electrolyte abnormalities  Repeat CBC tomorrow to monitor stability/drug side effect  Additional care per primary     2. Severe COVID, present on admission.  Patient was treated with remdesivir, dexamethasone and was given 1 dose of Tocilizumab on admission.  She also had a course of antibiotic initially for presumptive bacterial  superinfection.  COVID antigen was negative prior to coming off isolation.  Current chest CT is showing extensive fibrotic changes.  No additional antiviral needed.     3.  ELIESER on CKD with hyperkalemia.  This is likely due to ongoing high-dose Bactrim.  In addition to patient's leukocytosis and fever.  Bactrim discontinued  Antibiotic adjusted as above to Levaquin  Repeat chemistry tomorrow  Consider nephrology evaluation  Fluid hydration/electrolyte treatment per primary     4. Encephalopathy on admission.  This has resolved.  There was concern for possible seizure but no witnessed seizure and EEG did not capture it.  Monitor mental status when off sedation     5. Seizure disorder, status post temporal lobe resection in 2007.     6. B-cell lymphoma, on chemotherapy, which is currently postponed.  Patient was leukopenic on admission but WBC currently elevated, possibly from steroids/other medications  Monitor WBC/ANC.     Above plan discussed briefly with the patient's daughter at bedside  Above plan discussed in detail with primary service resident and attending who are in agreement with change in antibiotic given concern for side effect and considering adjustment of sedation    ID consult service will continue to follow.     Antibiotics:  Bactrim 10    24 Hour events:  Yesterday and overnight notes reviewed and patient was noted to develop progressing white count, hyperkalemia, ELIESER and leukocytosis on labs.  She also had fevers with episode of hypotension briefly requiring pressors.  She is also documented as having epistaxis.  Prior susceptibilities reviewed.  Antibiotics were broadened over the weekend    Subjective:  Patient intubated and sedated and does not interact on exam.  Currently not on any pressors.  Noted to have blood in suction canisters at bedside.  Again notes mention epistaxis which may be potential source.  Daughter is present at bedside and we reviewed concern for drug side effect of Bactrim  and adjustment to therapy.  Additionally reviewed medications further and discussed with critical care about possibility of switching off Precedex as well.    Objective:  Vitals:  Temp:  [99.3 °F (37.4 °C)-102.6 °F (39.2 °C)] 100.8 °F (38.2 °C)  HR:  [106-138] 122  Resp:  [17-27] 27  SpO2:  [91 %-95 %] 95 %  Temp (24hrs), Av.7 °F (38.2 °C), Min:99.3 °F (37.4 °C), Max:102.6 °F (39.2 °C)  Current: Temperature: (!) 100.8 °F (38.2 °C)    Physical Exam:   General Appearance:  Intubated and sedated and does not interact on exam.   Throat: Oropharynx moist without lesions.    Lungs:   Vented breath sounds throughout, no wheezes or rales.  Currently on FiO2 of 50%.   Heart:  RRR; no murmur, rub or gallop noted   Abdomen:   Soft, non-tender, non-distended, positive bowel sounds.     Extremities: No clubbing, cyanosis or edema   Skin: No new rashes or lesions. No new draining wounds noted.       Labs, Imaging, & Other studies:   All pertinent labs and imaging studies in PACS were personally reviewed as below.  Results from last 7 days   Lab Units 24  0452 24  0537 24  0511   WBC Thousand/uL 29.45* 27.26* 22.93*   HEMOGLOBIN g/dL 8.3* 8.8* 9.5*   PLATELETS Thousands/uL 349 355 268     Results from last 7 days   Lab Units 24  1132 24  0511 24  1833   POTASSIUM mmol/L 5.8*   < >  --    CHLORIDE mmol/L 110*   < >  --    CO2 mmol/L 24   < >  --    BUN mg/dL 102*   < >  --    CREATININE mg/dL 1.69*   < >  --    EGFR ml/min/1.73sq m 33   < >  --    CALCIUM mg/dL 10.6*   < >  --    AST U/L  --   --  17   ALT U/L  --   --  24   ALK PHOS U/L  --   --  72    < > = values in this interval not displayed.     Results from last 7 days   Lab Units 24  1818 24  1214 24  1209 24  1340   BLOOD CULTURE  No Growth at 24 hrs.  No Growth at 24 hrs.  --   --   --    SPUTUM CULTURE   --   --   --  1+ Growth of   GRAM STAIN RESULT   --  No polys seen*  Rare Gram positive cocci in  pairs* No Polys or Bacteria seen 2+ Polys  1+ Epithelial Cells  No bacteria seen       Lab interpretation/comments: Hyperkalemia, ELIESER, leukocytosis noted    Imaging interpretation/comments: No new imaging    Culture data: Recent bronchial cultures from 2/16 only with Candida.  Prior culture susceptibilities reviewed.  Blood cultures from the weekend so far without growth.

## 2024-02-19 NOTE — PLAN OF CARE
Problem: Prexisting or High Potential for Compromised Skin Integrity  Goal: Skin integrity is maintained or improved  Description: INTERVENTIONS:  - Identify patients at risk for skin breakdown  - Assess and monitor skin integrity  - Assess and monitor nutrition and hydration status  - Monitor labs   - Assess for incontinence   - Turn and reposition patient  - Assist with mobility/ambulation  - Relieve pressure over bony prominences  - Avoid friction and shearing  - Provide appropriate hygiene as needed including keeping skin clean and dry  - Evaluate need for skin moisturizer/barrier cream  - Collaborate with interdisciplinary team   - Patient/family teaching  - Consider wound care consult   Outcome: Not Progressing     Problem: INFECTION - ADULT  Goal: Absence or prevention of progression during hospitalization  Description: INTERVENTIONS:  - Assess and monitor for signs and symptoms of infection  - Monitor lab/diagnostic results  - Monitor all insertion sites, i.e. indwelling lines, tubes, and drains  - Monitor endotracheal if appropriate and nasal secretions for changes in amount and color  - [place] appropriate cooling/warming therapies per order  - Administer medications as ordered  - Instruct and encourage patient and family to use good hand hygiene technique  - Identify and instruct in appropriate isolation precautions for identified infection/condition  Outcome: Not Progressing     Problem: SAFETY ADULT  Goal: Patient will remain free of falls  Description: INTERVENTIONS:  - Educate patient/family on patient safety including physical limitations  - Instruct patient to call for assistance with activity   - Consult OT/PT to assist with strengthening/mobility   - Keep Call bell within reach  - Keep bed low and locked with side rails adjusted as appropriate  - Keep care items and personal belongings within reach  - Initiate and maintain comfort rounds  - Make Fall Risk Sign visible to staff  - Offer  Toileting every 2 Hours, in advance of need  - Initiate/Maintain bed alarm  - Obtain necessary fall risk management equipment: non skid footwear  - Apply yellow socks and bracelet for high fall risk patients  - Consider moving patient to room near nurses station  Outcome: Not Progressing     Problem: RESPIRATORY - ADULT  Goal: Achieves optimal ventilation and oxygenation  Description: INTERVENTIONS:  - Assess for changes in respiratory status  - Assess for changes in mentation and behavior  - Position to facilitate oxygenation and minimize respiratory effort  - Oxygen administered by appropriate delivery if ordered  - Initiate smoking cessation education as indicated  - Encourage broncho-pulmonary hygiene including cough, deep breathe, Incentive Spirometry  - Assess the need for suctioning and aspirate as needed  - Assess and instruct to report SOB or any respiratory difficulty  - Respiratory Therapy support as indicated  Outcome: Not Progressing     Problem: SKIN/TISSUE INTEGRITY - ADULT  Goal: Skin Integrity remains intact(Skin Breakdown Prevention)  Description: Assess:  -Perform Flavio assessment every shift   -Clean and moisturize skin every day   -Inspect skin when repositioning, toileting, and assisting with ADLS  -Assess under medical devices such as ronny  every hour   -Assess extremities for adequate circulation and sensation     Bed Management:  -Have minimal linens on bed & keep smooth, unwrinkled  -Change linens as needed when moist or perspiring  -Avoid sitting or lying in one position for more than 2 hours while in bed  -Keep HOB at 45 degrees     Toileting:  -Offer bedside commode  -Assess for incontinence every hour  -Use incontinent care products after each incontinent episode such as moisture barrier cream     Activity:  -Mobilize patient 3 times a day  -Encourage activity and walks on unit  -Encourage or provide ROM exercises   -Turn and reposition patient every 2 Hours  -Use appropriate  equipment to lift or move patient in bed  -Instruct/ Assist with weight shifting every hour when out of bed in chair  -Consider limitation of chair time 2 hour intervals    Skin Care:  -Avoid use of baby powder, tape, friction and shearing, hot water or constrictive clothing  -Relieve pressure over bony prominences using allevyn   -Do not massage red bony areas    Next Steps:  -Teach patient strategies to minimize risks such as weight shifting    -Consider consults to  interdisciplinary teams such as PT  Outcome: Not Progressing     Problem: Potential for Falls  Goal: Patient will remain free of falls  Description: INTERVENTIONS:  - Educate patient/family on patient safety including physical limitations  - Instruct patient to call for assistance with activity   - Consult OT/PT to assist with strengthening/mobility   - Keep Call bell within reach  - Keep bed low and locked with side rails adjusted as appropriate  - Keep care items and personal belongings within reach  - Initiate and maintain comfort rounds  - Make Fall Risk Sign visible to staff  - Offer Toileting every 2 Hours, in advance of need  - Initiate/Maintain bed alarm  - Obtain necessary fall risk management equipment: non skid footwear  - Apply yellow socks and bracelet for high fall risk patients  - Consider moving patient to room near nurses station  Outcome: Not Progressing     Problem: Nutrition/Hydration-ADULT  Goal: Nutrient/Hydration intake appropriate for improving, restoring or maintaining nutritional needs  Description: Monitor and assess patient's nutrition/hydration status for malnutrition. Collaborate with interdisciplinary team and initiate plan and interventions as ordered.  Monitor patient's weight and dietary intake as ordered or per policy. Utilize nutrition screening tool and intervene as necessary. Determine patient's food preferences and provide high-protein, high-caloric foods as appropriate.     INTERVENTIONS:  - Monitor oral intake,  urinary output, labs, and treatment plans  - Assess nutrition and hydration status and recommend course of action  - Evaluate amount of meals eaten  - Assist patient with eating if necessary   - Allow adequate time for meals  - Recommend/ encourage appropriate diets, oral nutritional supplements, and vitamin/mineral supplements  - Order, calculate, and assess calorie counts as needed  - Recommend, monitor, and adjust tube feedings and TPN/PPN based on assessed needs  - Assess need for intravenous fluids  - Provide specific nutrition/hydration education as appropriate  - Include patient/family/caregiver in decisions related to nutrition  Outcome: Not Progressing     Problem: SAFETY,RESTRAINT: NV/NON-SELF DESTRUCTIVE BEHAVIOR  Goal: Remains free of harm/injury (restraint for non violent/non self-detsructive behavior)  Description: INTERVENTIONS:  - Instruct patient/family regarding restraint use   - Assess and monitor physiologic and psychological status   - Provide interventions and comfort measures to meet assessed patient needs   - Identify and implement measures to help patient regain control  - Assess readiness for release of restraint   Outcome: Not Progressing  Goal: Returns to optimal restraint-free functioning  Description: INTERVENTIONS:  - Assess the patient's behavior and symptoms that indicate continued need for restraint  - Identify and implement measures to help patient regain control  - Assess readiness for release of restraint   Outcome: Not Progressing     Problem: COPING  Goal: Pt/Family able to verbalize concerns and demonstrate effective coping strategies  Description: INTERVENTIONS:  - Assist patient/family to identify coping skills, available support systems and cultural and spiritual values  - Provide emotional support, including active listening and acknowledgement of concerns of patient and caregivers  - Reduce environmental stimuli, as able  - Provide patient education  - Assess for  spiritual pain/suffering and initiate spiritual care, including notification of Pastoral Care or natalia based community as needed  - Assess effectiveness of coping strategies  Outcome: Not Progressing  Goal: Will report anxiety at manageable levels  Description: INTERVENTIONS:  - Administer medication as ordered  - Teach and encourage coping skills  - Provide emotional support  - Assess patient/family for anxiety and ability to cope  Outcome: Not Progressing     Problem: BEHAVIOR  Goal: Pt/Family maintain appropriate behavior and adhere to behavioral management agreement, if implemented  Description: INTERVENTIONS:  - Assess the family dynamic   - Encourage verbalization of thoughts and concerns in a socially appropriate manner  - Assess patient/family's coping skills and non-compliant behavior (including use of illegal substances).  - Utilize positive, consistent limit setting strategies supporting safety of patient, staff and others  - Initiate consult with Case Management, Spiritual Care or other ancillary services as appropriate  - If a patient's/visitor's behavior jeopardizes the safety of the patient, staff, or others, refer to organization procedure.   - Notify Security of behavior or suspected illegal substances which indicate the need for search of the patient and/or belongings  - Encourage participation in the decision making process about a behavioral management agreement; implement if patient meets criteria  Outcome: Not Progressing

## 2024-02-19 NOTE — QUICK NOTE
2/19/2024 4:08 PM -  Carla Hunt's chart and case were reviewed by Hannah Ramirez PA-C.  Mode of review included electronic chart check, and communication with primary team.      Per review, symptoms remain controlled on current regimen and no changes are made at this time. Primary team continues to focus on medical optimization without limits at this time      Palliative care will return on 2/20 for continuity and support of family.      For urgent issues or any questions/concerns, please notify on-call provider via ipadio.  You may also call our answering service 24/7 at [phone].    Hannah Ramirez PA-C  Palliative and Supportive Care  Clinic/Answering Service: [phone]  You can find me on ipadio!

## 2024-02-19 NOTE — OCCUPATIONAL THERAPY NOTE
Occupational Therapy Cancel Note     02/19/24 0724   OT Last Visit   OT Visit Date 02/19/24   Note Type   Note type Cancelled Session   Cancel Reasons Intubated/sedated           Lynda Rogers, OT

## 2024-02-19 NOTE — RESPIRATORY THERAPY NOTE
RT Ventilator Management Note  Carla Hunt 59 y.o. female MRN: [id number]  Unit/Bed#: [facility] 10 Encounter: [id number]      Daily Screen         2/18/2024  0740 2/19/2024  0733          Patient safety screen outcome:: Passed Failed      Not Ready for Weaning due to:: -- Underline problem not resolved                Physical Exam:   Assessment Type: Assess only  General Appearance: Sleeping  Respiratory Pattern: Assisted  Chest Assessment: Chest expansion symmetrical  Bilateral Breath Sounds: Diminished, Coarse  Cough: Strong, Non-productive      Resp Comments: (P) pt continues on SV settings tolerating it well, no changes at this time. will continue to monitor

## 2024-02-19 NOTE — PHYSICAL THERAPY NOTE
Physical Therapy Cancellation Note    PT orders received chart review completed. Pt is currently intubated/sedated and not appropriate to participate in skilled PT at this time. PT will follow and re-eval as medically appropriate.     02/19/24 1100   Note Type   Note type Cancelled Session   Cancel Reasons Intubated/sedated       Tania Augustin, PT

## 2024-02-19 NOTE — PLAN OF CARE
Problem: Prexisting or High Potential for Compromised Skin Integrity  Goal: Skin integrity is maintained or improved  Description: INTERVENTIONS:  - Identify patients at risk for skin breakdown  - Assess and monitor skin integrity  - Assess and monitor nutrition and hydration status  - Monitor labs   - Assess for incontinence   - Turn and reposition patient  - Assist with mobility/ambulation  - Relieve pressure over bony prominences  - Avoid friction and shearing  - Provide appropriate hygiene as needed including keeping skin clean and dry  - Evaluate need for skin moisturizer/barrier cream  - Collaborate with interdisciplinary team   - Patient/family teaching  - Consider wound care consult   Outcome: Progressing     Problem: SAFETY ADULT  Goal: Patient will remain free of falls  Description: INTERVENTIONS:  - Educate patient/family on patient safety including physical limitations  - Instruct patient to call for assistance with activity   - Consult OT/PT to assist with strengthening/mobility   - Keep Call bell within reach  - Keep bed low and locked with side rails adjusted as appropriate  - Keep care items and personal belongings within reach  - Initiate and maintain comfort rounds  - Make Fall Risk Sign visible to staff  - Offer Toileting every 2 Hours, in advance of need  - Initiate/Maintain bed alarm  - Obtain necessary fall risk management equipment: non skid footwear  - Apply yellow socks and bracelet for high fall risk patients  - Consider moving patient to room near nurses station  Outcome: Progressing     Problem: RESPIRATORY - ADULT  Goal: Achieves optimal ventilation and oxygenation  Description: INTERVENTIONS:  - Assess for changes in respiratory status  - Assess for changes in mentation and behavior  - Position to facilitate oxygenation and minimize respiratory effort  - Oxygen administered by appropriate delivery if ordered  - Initiate smoking cessation education as indicated  - Encourage  broncho-pulmonary hygiene including cough, deep breathe, Incentive Spirometry  - Assess the need for suctioning and aspirate as needed  - Assess and instruct to report SOB or any respiratory difficulty  - Respiratory Therapy support as indicated  Outcome: Progressing     Problem: SKIN/TISSUE INTEGRITY - ADULT  Goal: Skin Integrity remains intact(Skin Breakdown Prevention)  Description: Assess:  -Perform Flavio assessment every shift   -Clean and moisturize skin every day   -Inspect skin when repositioning, toileting, and assisting with ADLS  -Assess under medical devices such as ronny  every hour   -Assess extremities for adequate circulation and sensation     Bed Management:  -Have minimal linens on bed & keep smooth, unwrinkled  -Change linens as needed when moist or perspiring  -Avoid sitting or lying in one position for more than 2 hours while in bed  -Keep HOB at 45 degrees     Toileting:  -Offer bedside commode  -Assess for incontinence every hour  -Use incontinent care products after each incontinent episode such as moisture barrier cream     Activity:  -Mobilize patient 3 times a day  -Encourage activity and walks on unit  -Encourage or provide ROM exercises   -Turn and reposition patient every 2 Hours  -Use appropriate equipment to lift or move patient in bed  -Instruct/ Assist with weight shifting every hour when out of bed in chair  -Consider limitation of chair time 2 hour intervals    Skin Care:  -Avoid use of baby powder, tape, friction and shearing, hot water or constrictive clothing  -Relieve pressure over bony prominences using allevyn   -Do not massage red bony areas    Next Steps:  -Teach patient strategies to minimize risks such as weight shifting    -Consider consults to  interdisciplinary teams such as PT  Outcome: Progressing     Problem: Potential for Falls  Goal: Patient will remain free of falls  Description: INTERVENTIONS:  - Educate patient/family on patient safety including physical  limitations  - Instruct patient to call for assistance with activity   - Consult OT/PT to assist with strengthening/mobility   - Keep Call bell within reach  - Keep bed low and locked with side rails adjusted as appropriate  - Keep care items and personal belongings within reach  - Initiate and maintain comfort rounds  - Make Fall Risk Sign visible to staff  - Offer Toileting every 2 Hours, in advance of need  - Initiate/Maintain bed alarm  - Obtain necessary fall risk management equipment: non skid footwear  - Apply yellow socks and bracelet for high fall risk patients  - Consider moving patient to room near nurses station  Outcome: Progressing     Problem: Nutrition/Hydration-ADULT  Goal: Nutrient/Hydration intake appropriate for improving, restoring or maintaining nutritional needs  Description: Monitor and assess patient's nutrition/hydration status for malnutrition. Collaborate with interdisciplinary team and initiate plan and interventions as ordered.  Monitor patient's weight and dietary intake as ordered or per policy. Utilize nutrition screening tool and intervene as necessary. Determine patient's food preferences and provide high-protein, high-caloric foods as appropriate.     INTERVENTIONS:  - Monitor oral intake, urinary output, labs, and treatment plans  - Assess nutrition and hydration status and recommend course of action  - Evaluate amount of meals eaten  - Assist patient with eating if necessary   - Allow adequate time for meals  - Recommend/ encourage appropriate diets, oral nutritional supplements, and vitamin/mineral supplements  - Order, calculate, and assess calorie counts as needed  - Recommend, monitor, and adjust tube feedings and TPN/PPN based on assessed needs  - Assess need for intravenous fluids  - Provide specific nutrition/hydration education as appropriate  - Include patient/family/caregiver in decisions related to nutrition  Outcome: Progressing     Problem: SAFETY,RESTRAINT:  NV/NON-SELF DESTRUCTIVE BEHAVIOR  Goal: Remains free of harm/injury (restraint for non violent/non self-detsructive behavior)  Description: INTERVENTIONS:  - Instruct patient/family regarding restraint use   - Assess and monitor physiologic and psychological status   - Provide interventions and comfort measures to meet assessed patient needs   - Identify and implement measures to help patient regain control  - Assess readiness for release of restraint   Outcome: Progressing     Problem: COPING  Goal: Pt/Family able to verbalize concerns and demonstrate effective coping strategies  Description: INTERVENTIONS:  - Assist patient/family to identify coping skills, available support systems and cultural and spiritual values  - Provide emotional support, including active listening and acknowledgement of concerns of patient and caregivers  - Reduce environmental stimuli, as able  - Provide patient education  - Assess for spiritual pain/suffering and initiate spiritual care, including notification of Pastoral Care or natalia based community as needed  - Assess effectiveness of coping strategies  Outcome: Progressing  Goal: Will report anxiety at manageable levels  Description: INTERVENTIONS:  - Administer medication as ordered  - Teach and encourage coping skills  - Provide emotional support  - Assess patient/family for anxiety and ability to cope  Outcome: Progressing     Problem: BEHAVIOR  Goal: Pt/Family maintain appropriate behavior and adhere to behavioral management agreement, if implemented  Description: INTERVENTIONS:  - Assess the family dynamic   - Encourage verbalization of thoughts and concerns in a socially appropriate manner  - Assess patient/family's coping skills and non-compliant behavior (including use of illegal substances).  - Utilize positive, consistent limit setting strategies supporting safety of patient, staff and others  - Initiate consult with Case Management, Spiritual Care or other ancillary  services as appropriate  - If a patient's/visitor's behavior jeopardizes the safety of the patient, staff, or others, refer to organization procedure.   - Notify Security of behavior or suspected illegal substances which indicate the need for search of the patient and/or belongings  - Encourage participation in the decision making process about a behavioral management agreement; implement if patient meets criteria  Outcome: Progressing

## 2024-02-19 NOTE — CONSULTS
Vancomycin IV Pharmacy-to-Dose Consultation    Carla Hunt is a 59 y.o. female who was receiving Vancomycin IV with management by the Pharmacy Consult service for treatment of Pneumonia (goal -600, trough >10)  .       The patient’s Vancomycin therapy has been discontinued. Thank you for allowing us to take part in this patient's care. Pharmacy will sign-off now; please call or re-consult if there are any questions.        Gabe Henriquez, PharmD, BCCCP  Critical Care Clinical Pharmacist  Available via [employer]

## 2024-02-19 NOTE — UTILIZATION REVIEW
Continued Stay Review    Date: 02/17, 02/18, 02/19                        Current Patient Class: IP  Current Level of Care: Level 2 stepdown/ HOT    HPI:59 y.o. female initially admitted on 01/10     Assessment/Plan:   02/17 CC Notes: Patient w/ worsening oxygenation since yesterday, FiO2 was increased from 70% to 80% ovn. CRP continues to rise at a very fast rate.   On exam today, intubated, sedated, following commands, +ansarca. Cont MV, sedation. Increase Solu-Medrol 125 mg IV every 12 hours for today. Follow-up bronchoscopy cultures. Cont TF. Strict I/Os. Con to mon renal function. Diuresis for goal net -1 L over the next 24 hours. Cont Bactrim. DVT ppx.    02/18 CC Notes: Patient remains intubated and following commands. More lethargic than yesterday. Fever to 101F overnight. On exam, Intubated, sedated, lethargic but arousable, following simple commands. Maintain sedation. Cont to mon MS. Cont MV- FiO2 55%, PEEP10. Cont Solu Medrol. F/u bronch cx results. Cont IV abx. Continue to monitor nares for recent epistaxis. Cont TF. Cont Bactrim.    02/19 CC Notes: Overnight, patient was noted to be in sinus tachycardia with rates of 130-140. Noted febrile earlier this morning Temps-100.9 F - 101.5F. Switched to ASV mode. cont sedation w/ dilaudid, dc precedex, Versed if needed. Trend triglyceride. Cont MV w/ ASV mode, wean FiO2 prn, maintain Pulse ox>90%. Cont IV steroids. Cont TF. Give IV fluids boluses and albumin bolus and start maintenance fluid. Hold diuretics. trend BMP/creatinine. switch antibiotics to Levaquin. Follow cultures. Continue insulin infusion and monitor blood glucose. Maintain Ortiz catheter for now for I's/O's monitoring, if kidney function improves tomorrow will DC Ortiz catheter. Continue A-line for now for hemodynamic monitoring.    Vital Signs:   Date/Time Temp Pulse Resp Arterial Line BP MAP SpO2 O2 Device   02/19/24 1400 100.8 °F (38.2 °C) Abnormal  122 Abnormal  27 Abnormal  120/59 77 mmHg  95 % --   02/19/24 1300 101.1 °F (38.4 °C) Abnormal  127 Abnormal  24 Abnormal  110/58 73 mmHg 94 % --   02/19/24 1200 101.5 °F (38.6 °C) Abnormal  130 Abnormal  26 Abnormal  104/55 69 mmHg 95 % Ventilator   02/19/24 1000 101.8 °F (38.8 °C) Abnormal  125 Abnormal  23 Abnormal  113/58 74 mmHg 93 % --   02/19/24 0900 102 °F (38.9 °C) Abnormal  125 Abnormal  20 103/55 69 mmHg 94 % --   02/19/24 0800 102.6 °F (39.2 °C) Abnormal  133 Abnormal  27 Abnormal  125/63 81 mmHg 94 % Ventilator   02/19/24 0500 101.5 °F (38.6 °C) Abnormal  129 Abnormal  26 Abnormal  120/61 79 mmHg 91 % --   02/19/24 0438 -- -- -- -- -- 91 % --   02/19/24 0400 100.9 °F (38.3 °C) Abnormal  122 Abnormal  26 Abnormal  121/61 79 mmHg 91 % Ventilator   02/19/24 0100 -- -- -- -- -- 93 % --   02/19/24 0000 99.9 °F (37.7 °C) 109 Abnormal  19 113/59 77 mmHg 92 % --   02/18/24 2300 99.7 °F (37.6 °C) 106 Abnormal  17 103/55 69 mmHg 92 % --   02/18/24 2200 99.9 °F (37.7 °C) 115 Abnormal  21 113/58 75 mmHg 92 % --   02/18/24 2100 99.3 °F (37.4 °C) 125 Abnormal  22 101/54 68 mmHg 91 % --   02/18/24 2015 -- -- -- -- -- 92 % --   02/18/24 2000 100.8 °F (38.2 °C) Abnormal  108 Abnormal  24 Abnormal  98/54 68 mmHg 94 % Ventilator   02/18/24 1900 100.6 °F (38.1 °C) Abnormal  108 Abnormal  22 95/52 66 mmHg 93 % --   02/18/24 1800 100.4 °F (38 °C) 110 Abnormal  19 98/54 68 mmHg 93 % --   02/18/24 1700 100 °F (37.8 °C) 138 Abnormal  22 137/68 89 mmHg 92 % --   02/18/24 1600 99.7 °F (37.6 °C) 128 Abnormal  21 126/65 83 mmHg 92 % --   02/18/24 1500 99.1 °F (37.3 °C) 123 Abnormal  23 Abnormal  140/71 93 mmHg 90 % --   02/18/24 1400 98.6 °F (37 °C) 121 Abnormal  20 164/76 104 mmHg 91 % --   02/18/24 1300 98.4 °F (36.9 °C) 108 Abnormal  18 144/68 92 mmHg 91 % --   02/18/24 1200 98.8 °F (37.1 °C) 104 20 121/60 79 mmHg 92 % --   02/18/24 1100 -- 104 17 123/62 81 mmHg 91 % --   02/18/24 1000 97.5 °F (36.4 °C) 116 Abnormal  22 129/65 85 mmHg 93 % --   02/18/24 0900 99.7 °F  (37.6 °C) 106 Abnormal  25 Abnormal  126/62 81 mmHg 93 % --   02/18/24 0800 99.5 °F (37.5 °C) 100 24 Abnormal  122/58 76 mmHg 92 % --   02/18/24 0740 -- -- -- -- -- 94 % --   02/18/24 0700 99.7 °F (37.6 °C) 98 19 123/59 78 mmHg 99 % --   02/18/24 0600 97.7 °F (36.5 °C) 94 22 128/61 81 mmHg 98 % --   02/18/24 0500 97.5 °F (36.4 °C) 89 24 Abnormal  125/60 80 mmHg 99 % --   02/18/24 0400 97.2 °F (36.2 °C) Abnormal  90 22 126/61 81 mmHg 99 % --   02/18/24 0316 -- -- -- -- -- 99 % --   02/18/24 0300 97 °F (36.1 °C) Abnormal  86 23 Abnormal  128/62 83 mmHg 98 % --   02/18/24 0200 99.1 °F (37.3 °C) 87 24 Abnormal  130/64 86 mmHg 98 % --   02/18/24 0100 99.3 °F (37.4 °C) 90 25 Abnormal  135/67 88 mmHg 96 % --   02/18/24 0000 100 °F (37.8 °C) 92 20 148/72 96 mmHg 94 % --   02/17/24 2358 -- -- -- -- -- 94 % --   02/17/24 2300 100.6 °F (38.1 °C) Abnormal  98 20 148/72 96 mmHg 94 % --   02/17/24 2200 101.3 °F (38.5 °C) Abnormal  102 20 115/60 76 mmHg 92 % --   02/17/24 2100 101.8 °F (38.8 °C) Abnormal  131 Abnormal  27 Abnormal  141/66 91 mmHg 94 % --   02/17/24 2011 -- -- -- -- -- 92 % --   02/17/24 2000 101.5 °F (38.6 °C) Abnormal  118 Abnormal  20 97/55 68 mmHg 93 % --   02/17/24 1900 101.3 °F (38.5 °C) Abnormal  122 Abnormal  17 97/52 66 mmHg 94 % --   02/17/24 1800 100.8 °F (38.2 °C) Abnormal  141 Abnormal  19 129/65 85 mmHg 92 % --   02/17/24 1700 100.8 °F (38.2 °C) Abnormal  125 Abnormal  19 111/57 75 mmHg 95 % --   02/17/24 1600 100.6 °F (38.1 °C) Abnormal  120 Abnormal  19 117/62 80 mmHg 95 % --   02/17/24 1500 100.4 °F (38 °C) 109 Abnormal  20 113/60 78 mmHg 94 % --   02/17/24 1400 100 °F (37.8 °C) 114 Abnormal  23 Abnormal  116/63 80 mmHg 94 % --   02/17/24 1300 99.7 °F (37.6 °C) 110 Abnormal  16 117/62 81 mmHg 94 % --   02/17/24 1200 99.3 °F (37.4 °C) 102 18 120/60 81 mmHg 95 % --   02/17/24 1100 99.1 °F (37.3 °C) 97 16 113/56 76 mmHg 92 % --   02/17/24 1000 98.8 °F (37.1 °C) 96 19 115/60 80 mmHg 91 % --   02/17/24  0900 98.4 °F (36.9 °C) 121 Abnormal  19 152/75 99 mmHg 91 % --   02/17/24 0800 98.1 °F (36.7 °C) 93 23 Abnormal  137/69 93 mmHg 93 % --   02/17/24 0700 97.9 °F (36.6 °C) 88 21 114/60 79 mmHg 93 % --   02/17/24 0600 97.9 °F (36.6 °C) 99 17 145/69 98 mmHg 93 % --       Pertinent Labs/Diagnostic Results:       Results from last 7 days   Lab Units 02/19/24  0452 02/18/24  0537 02/17/24  0511 02/16/24  0432 02/15/24  0448 02/14/24  0552 02/13/24  0552   WBC Thousand/uL 29.45* 27.26* 22.93* 20.62* 7.77   < > 7.29   HEMOGLOBIN g/dL 8.3* 8.8* 9.5* 10.2* 9.5*   < > 10.1*   HEMATOCRIT % 25.1* 24.9* 28.3* 28.7* 29.4*   < > 31.3*   PLATELETS Thousands/uL 349 355 268 236 210   < > 173   BANDS PCT %  --   --  1  --   --   --  3    < > = values in this interval not displayed.         Results from last 7 days   Lab Units 02/19/24  1132 02/19/24  0452 02/18/24  0537 02/17/24  0511 02/16/24  0432 02/15/24  0448   SODIUM mmol/L 142 139 133* 137 128* 129*   POTASSIUM mmol/L 5.8* 5.9* 5.6* 5.1 4.8 5.1   CHLORIDE mmol/L 110* 106 101 106 101 104   CO2 mmol/L 24 21 21 20* 18* 21   ANION GAP mmol/L 8 12 11 11 9 4   BUN mg/dL 102* 107* 105* 71* 42* 38*   CREATININE mg/dL 1.69* 1.81* 1.92* 1.28 1.22 1.15   EGFR ml/min/1.73sq m 33 30 28 46 48 52   CALCIUM mg/dL 10.6* 11.3* 10.9* 10.7* 9.7 9.5   MAGNESIUM mg/dL  --  2.9* 3.0* 2.7 2.2 2.4     Results from last 7 days   Lab Units 02/16/24  1833   AST U/L 17   ALT U/L 24   ALK PHOS U/L 72   TOTAL PROTEIN g/dL 8.3   ALBUMIN g/dL 3.1*   TOTAL BILIRUBIN mg/dL 0.68   BILIRUBIN DIRECT mg/dL 0.20     Results from last 7 days   Lab Units 02/19/24  1410 02/19/24  1122 02/19/24  1008 02/19/24  0758 02/18/24  2342 02/18/24  1729 02/18/24  1446 02/18/24  0910 02/18/24  0555 02/18/24  0109 02/17/24  2352 02/17/24  1756   POC GLUCOSE mg/dl 159* 223* 307* 330* 263* 192* 241* 212* 282* 266* 193* 203*     Results from last 7 days   Lab Units 02/19/24  1132 02/19/24  0452 02/18/24  0537 02/17/24  0511  "02/16/24  0432 02/15/24  0448 02/14/24  0552 02/13/24  0552   GLUCOSE RANDOM mg/dL 185* 397* 275* 247* 142* 113 119 160*     Results from last 7 days   Lab Units 02/19/24  1132 02/19/24  0758 02/19/24  0752 02/19/24  0452 02/18/24  2013 02/18/24  1711 02/18/24  0926 02/18/24  0537 02/18/24  0111 02/17/24  2116   OSMOLALITY, SERUM mmol/* 366* 365* 365* 360* 353* 345* 347* 345* 339*         No results found for: \"BETA-HYDROXYBUTYRATE\"   Results from last 7 days   Lab Units 02/16/24  0756 02/15/24  0448 02/14/24  0556   PH ART  7.351 7.401 7.382   PCO2 ART mm Hg 37.0 34.7* 38.5   PO2 ART mm Hg 119.3 95.5 107.8   HCO3 ART mmol/L 20.0* 21.1* 22.4   BASE EXC ART mmol/L -5.0 -3.1 -2.4   O2 CONTENT ART mL/dL 17.7 15.6* 15.0*   O2 HGB, ARTERIAL % 96.7 95.9 96.9   ABG SOURCE  Line, Arterial Line, Arterial Line, Arterial     Results from last 7 days   Lab Units 02/19/24  1023   PH NICA  7.404*   PCO2 NICA mm Hg 35.8*   PO2 NICA mm Hg 107.6*   HCO3 NICA mmol/L 21.9*   BASE EXC NICA mmol/L -2.5   O2 CONTENT NICA ml/dL 11.3   O2 HGB, VENOUS % 96.1*                             Results from last 7 days   Lab Units 02/18/24  0537 02/17/24  0908   PROCALCITONIN ng/ml 0.90* 0.55*                                     Results from last 7 days   Lab Units 02/16/24  0432   LIPASE u/L 47     Results from last 7 days   Lab Units 02/19/24  0452 02/18/24  0537 02/17/24  0511 02/16/24  0432 02/15/24  0448   CRP mg/L 116.9* 221.0* 288.4* 35.9* 8.3*         Results from last 7 days   Lab Units 02/19/24  1132 02/19/24  0758 02/19/24  0752 02/19/24  0452 02/18/24  2013 02/18/24  1711 02/18/24  0926 02/15/24  1544 02/15/24  0752   OSMOLALITY, SERUM mmol/* 366* 365* 365* 360* 353* 345*   < > 301*   OSMO UR mmol/KG  --   --   --   --   --   --   --   --  475    < > = values in this interval not displayed.     Results from last 7 days   Lab Units 02/19/24  1356 02/18/24  1442 02/15/24  0752   CLARITY UA   --  Turbid  --    COLOR UA   --  Light " Yellow  --    SPEC GRAV UA   --  1.015  --    PH UA   --  5.5  --    GLUCOSE UA mg/dl  --  Negative  --    KETONES UA mg/dl  --  Negative  --    BLOOD UA   --  Large*  --    PROTEIN UA mg/dl  --  Trace*  --    NITRITE UA   --  Negative  --    BILIRUBIN UA   --  Negative  --    UROBILINOGEN UA (BE) mg/dl  --  <2.0  --    LEUKOCYTES UA   --  Moderate*  --    WBC UA /hpf  --  30-50*  --    RBC UA /hpf  --  30-50*  --    BACTERIA UA /hpf  --  Occasional  --    EPITHELIAL CELLS WET PREP /hpf  --  Occasional  --    MUCUS THREADS   --  Occasional*  --    SODIUM UR   --   --  57   CREATININE UR mg/dL 26.1  --   --                                  Results from last 7 days   Lab Units 02/17/24  1818 02/16/24  1214 02/16/24  1209 02/14/24  1340   BLOOD CULTURE  No Growth at 24 hrs.  No Growth at 24 hrs.  --   --   --    SPUTUM CULTURE   --   --   --  1+ Growth of   GRAM STAIN RESULT   --  No polys seen*  Rare Gram positive cocci in pairs* No Polys or Bacteria seen 2+ Polys  1+ Epithelial Cells  No bacteria seen     Results from last 7 days   Lab Units 02/16/24  1209   TOTAL COUNTED  100         Results from last 7 days   Lab Units 02/19/24  0452   VANCOMYCIN RM ug/mL 42.8*       Medications:   Scheduled Medications:  acetaminophen, 1,000 mg, Intravenous, Q8H SARTHAK  chlorhexidine, 15 mL, Mouth/Throat, Q12H SARTHAK  enoxaparin, 40 mg, Subcutaneous, Daily  famotidine, 20 mg, Oral, BID  lactulose, 20 g, Oral, TID  lamoTRIgine, 150 mg, Oral, BID  levofloxacin, 750 mg, Intravenous, Q48H  methylPREDNISolone sodium succinate, 125 mg, Intravenous, Q12H SARTHAK  nystatin, , Topical, BID  polyethylene glycol, 17 g, Oral, Daily  QUEtiapine, 12.5 mg, Oral, HS  senna-docusate sodium, 2 tablet, Oral, BID  sodium chloride, 1 Application, Nasal, Q2H  sodium chloride, 1 spray, Each Nare, Q2H  topiramate, 100 mg, Oral, BID  venlafaxine, 25 mg, Per NG Tube, TID With Meals      Continuous IV Infusions:  dexmedetomidine, 0.1-1.2 mcg/kg/hr, Intravenous,  Titrated  HYDROmorphone, 0.5 mg/hr, Intravenous, Continuous  insulin regular (HumuLIN R,NovoLIN R) 1 Units/mL in sodium chloride 0.9 % 100 mL infusion, 0.3-21 Units/hr, Intravenous, Titrated  norepinephrine, 1-30 mcg/min, Intravenous, Titrated      PRN Meds:  bisacodyl, 10 mg, Rectal, Daily PRN  calcium carbonate, 500 mg, Oral, BID PRN  HYDROmorphone, 0.5 mg, Intravenous, Q1H PRN  LORazepam, 0.5 mg, Intravenous, Q6H PRN  ondansetron, 4 mg, Intravenous, Q6H PRN        Discharge Plan: D    Network Utilization Review Department  ATTENTION: Please call with any questions or concerns to [phone] and carefully listen to the prompts so that you are directed to the right person. All voicemails are confidential.   For Discharge needs, contact Care Management DC Support Team at [phone] opt. 2  Send all requests for admission clinical reviews, approved or denied determinations and any other requests to dedicated fax number below belonging to the [facility] where the patient is receiving treatment. List of dedicated fax numbers for the Facilities:  FACILITY NAME UR FAX NUMBER   ADMISSION DENIALS (Administrative/Medical Necessity) [phone]   DISCHARGE SUPPORT TEAM (NETWORK) [phone]   PARENT CHILD HEALTH (Maternity/NICU/Pediatrics) [phone]   [facility] [phone]   [facility] [phone]   [facility] [phone]   [facility] [phone]   [facility] [phone]   [facility] [phone]   [facility] [phone]   [facility] [phone]   [facility] [phone]   [facility] [phone]   [facility] [phone]   [facility] [phone]

## 2024-02-19 NOTE — PROGRESS NOTES
[facility]  Progress Note: Critical Care  Name: Carla Hunt 59 y.o. female I MRN: [id number]  Unit/Bed#: MICU 10 I Date of Admission: 1/10/2024   Date of Service: 2/19/2024 I Hospital Day: 40    Assessment/Plan     Acute hypoxic respiratory failure due to severe covid/covid induced fibrosis  Hyperkalemia  Steroid induced hyperglycemia  Acute kidney Injury/ATN  B cell lymphoma  CKD stage III  Seizure disorder  Anxiety     Neuro:   Sedation: Off propofol, now on precedex and dilaudid drip, both at 0.5     Diagnosis: seizure disorder  -S/p partial left temporal lobe resection in 2007  -Contuinue home lamictal 150 bid and topamax 100 bid     Diagnosis: Anxiety  -On venlafaxine 25 mg TID via NGT  -Seroquel 12.5 hs and melatonin 6 hs for sleep     CV:   Diagnosis: Sinus tachycardia  -Likely in the setting of underlying inflammatory process and/or ongoing concern for infectious process  -Would continue with treatment of underlying etiology      Pulm:  Diagnosis: Acute hypoxic respiratory failure due to severe covid  and covid induced fibrosis  -Tested COVID positive on 1/7  -Completed severe COVID pathway and 7 days CTX  -Tenuous respiratory status requiring multiple re-admissions to MICU  -S/p Bronch 2/2 and 02/16  -NG on cultures (initially showed non-group A strep)  -PCP and AFB negative   -Legionella, viral, fungal cultures no growth to date  -Pulse dose steroids with solumedrol 1g daily x3 days (completed 2/7) then transitioned to prednisone 80mg daily on 2/8  -CRP trend 288 (02/17)> 221 (02/19) > 116.9 (02/20).  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 Given 1x 500 cc bolus this morning.   E: monitor and replete for goal K>4, P>3, Mg>2  N: Tube feeds      Heme/Onc:   Diagnosis: B cell lymphoma  -[facility]s [facility] heme[facility]onc at [facility]  -On rituxan for 2 year course, started May 2022  -Last dose was 12/20, treatment currently on hold   -Leukopenic on admission but WBC now wnl     DVT ppx with lovenox      Endo:   Diagnosis: steroid hyperglycemia and diabetes mellitus type 2, A1c at 6.6 from 01/2024  -On tube feeds, glucerna  -Goal -180  -BG range last 24hrs 1212-397, Started on insulin drip, this may also help with ongoing hyperkalemia     ID:   Diagnosis: Severe covid pneumonia and stenotrophomonas pneumonia  -Completed severe COVID pathway with decadron (ended 1/22) and remdesivir (ended 1/20), also completed 7 days CTX on 1/15  -C/f opportunistic infections given immunocompromised status on chemotherapy and recent steroid use  -No consolidations on CXR and procal negative  -S/p bactrim and minocycline x5 days for stenotrophomonas on sputum culture (1/25), then on bactrim for PJP ppx  -Bronc on 2/2 - Cx w/o growth (initially resulted as 1+ alpha hemolytic strep), AFB & PCP negative, f/u fungal, legionella, and viral cultures   -2/10 pt again had escalating O2 requirements on floors necessitating readmission to ICU  -Concern that with recent  pulse dose steroids and now worsening respiratory status she could have infection, possibly opportunistic   -UA with moderate leukocytes, nitrite negative, 30-50 WBC, trace protein.  -Repeat sputum culture 2/9 with 4+ stenotrophomonas  -Bronch cultures with candida albicans, Rare gram positive cocci in pairs  -On high dose bactrim, day 10 today. Was also on vancomycin and cefepime. As per ID recommendations this morning, now off all three and switched to levaquin 750 mg IV ever 48 hours, Plan to continue for 5 days.     MSK/Skin:   -Wound care team following for bilateral sacral wounds    Disposition: Critical care    ICU Core Measures     Vented Patient  VAP Bundle  VAP bundle ordered     A: Assess, Prevent, and Manage Pain Has pain been assessed? Yes  Need for changes to pain regimen? No   B: Both Spontaneous Awakening Trials (SATs) and Spontaneous Breathing Trials (SBTs) Plan to perform spontaneous awakening trial today? Yes   Plan to perform spontaneous breathing trial today? Yes   Obvious barriers to extubation? Yes   C: Choice of Sedation RASS Goal: 0 Alert and Calm  Need for changes to sedation or analgesia regimen? No   D: Delirium CAM-ICU: Negative   E: Early Mobility  Plan for early mobility? Yes   F: Family Engagement Plan for family engagement today? Yes       Antibiotic Review: Patient on appropriate coverage based on culture data.     Review of Invasive Devices:    Diana Plan: Continue for accurate I/O monitoring for 48 hours    Amelia Plan: Keep arterial line for hemodynamic monitoring    Prophylaxis:  VTE VTE covered by:  enoxaparin, Subcutaneous, 40 mg at 02/18/24 0846       Stress Ulcer  covered byfamotidine (PEPCID) oral suspension 20 mg [[id number]]        Significant 24hr Events     24hr events: Overnight, patient was noted to be in sinus tachycardia with rates of 130-140. She was also febrile earlier this morning with temperatures ranging between, 100.9 F - 101.5F. She was switched to ASV mode.       Subjective   Patient seen by bedside, plan to discuss medical updates with patients family today.     Review of Systems   Unable to perform ROS: Patient nonverbal        Objective                            Vitals I/O      Most Recent Min/Max in 24hrs   Temp (!) 101.5 °F (38.6 °C) Temp  Min: 97.5 °F (36.4 °C)  Max: 101.5 °F (38.6 °C)   Pulse (!) 129 Pulse  Min: 100  Max: 138   Resp (!) 26 Resp  Min: 17  Max: 26   BP 90/50 No data recorded   O2 Sat 91 % SpO2  Min: 90 %  Max: 94 %   Vent settings: %, 10 PEEP, 55% FiO2  Precedex drip: 0.5  Dilaudid drip: 0.5   Intake/Output Summary (Last 24 hours) at 2/19/2024 0714  Last data filed at 2/19/2024 0601  Gross per 24 hour   Intake 3029.29 ml   Output 2345 ml   Net 684.29 ml       Diet Enteral/Parenteral; Tube Feeding No Oral Diet; Glucerna 1.2; Continuous; 60; 50; Every 6 hours    Invasive Monitoring   Arterial Line  Cropsey /61  Arterial Line BP  Min: 95/52  Max: 164/76   MAP 79 mmHg  Arterial Line MAP (mmHg)  Min: 66 mmHg  Max: 104 mmHg           Physical Exam   Physical Exam  Vitals reviewed.   Skin:     Capillary Refill: Capillary refill takes less than 2 seconds.      Comments: Cold to touch in lower extremities   HENT:      Head: Normocephalic.      Mouth/Throat:      Mouth: Mucous membranes are moist.   Cardiovascular:      Rate and Rhythm: Tachycardia present.      Pulses: Normal pulses.   Abdominal: General: There is distension.     Palpations: Abdomen is soft.   Constitutional:       Appearance: She is ill-appearing.      Interventions: She is sedated and intubated.      Comments: Has a ETT, NGT tube, marie, arterial line on left side, 2x PIV   Pulmonary:      Effort: Pulmonary effort is normal. She is intubated.      Comments: Mechanically ventilated breath sounds  Neurological:      Mental Status: She is lethargic.      Comments: Appears lethargic this morning, does wake up to verbal stimuli but falls asleep shortly thereafter. Able to follow  simple commands. Able to move extremities spontaneously.    Genitourinary/Anorectal:  Ortiz present.          Diagnostic Studies      EKG: Reviewed  Imaging: Reviewed I have personally reviewed pertinent reports.       Medications:  Scheduled PRN   cefepime, 2,000 mg, Q12H  chlorhexidine, 15 mL, Q12H SARTHAK  enoxaparin, 40 mg, Daily  famotidine, 20 mg, BID  insulin glargine, 12 Units, HS  insulin lispro, 1-5 Units, Q6H SARTHAK  insulin lispro, 4 Units, Q6H  lactulose, 20 g, TID  lamoTRIgine, 150 mg, BID  methylPREDNISolone sodium succinate, 125 mg, Q12H SARTHAK  metolazone, 2.5 mg, Daily  nystatin, , BID  polyethylene glycol, 17 g, Daily  QUEtiapine, 12.5 mg, HS  senna-docusate sodium, 2 tablet, BID  sodium chloride, 1 Application, Q2H  sodium chloride, 1 spray, Q2H  sulfamethoxazole-trimethoprim, 5 mg/kg of trimethoprim, TID  topiramate, 100 mg, BID  vancomycin, 1,000 mg, Q24H  venlafaxine, 25 mg, TID With Meals      acetaminophen, 975 mg, Q8H PRN  bisacodyl, 10 mg, Daily PRN  calcium carbonate, 500 mg, BID PRN  HYDROmorphone, 0.5 mg, Q1H PRN  LORazepam, 0.5 mg, Q6H PRN  metoprolol, 2.5 mg, Q6H PRN  ondansetron, 4 mg, Q6H PRN       Continuous    dexmedetomidine, 0.1-1.2 mcg/kg/hr, Last Rate: 0.3 mcg/kg/hr (02/18/24 2354)  HYDROmorphone, 0.5 mg/hr, Last Rate: 0.5 mg/hr (02/18/24 2000)  norepinephrine, 1-30 mcg/min, Last Rate: Stopped (02/18/24 0911)         Labs:    CBC    Recent Labs     02/18/24  0537 02/19/24  0452   WBC 27.26* 29.45*   HGB 8.8* 8.3*   HCT 24.9* 25.1*    349     BMP    Recent Labs     02/18/24  0537 02/19/24  0452   SODIUM 133* 139   K 5.6* 5.9*    106   CO2 21 21   AGAP 11 12   * 107*   CREATININE 1.92* 1.81*   CALCIUM 10.9* 11.3*       Coags    No recent results     Additional Electrolytes  Recent Labs     02/18/24  0537 02/19/24  0452   MG 3.0* 2.9*          Blood Gas    No recent results  No recent results LFTs  No recent results    Infectious  Recent Labs     02/17/24  0908  02/18/24  0537   PROCALCITONI 0.55* 0.90*     Glucose  Recent Labs     02/18/24  0537 02/19/24  0452   GLUC 275* 397*             Miguel Whittaker MD

## 2024-02-19 NOTE — PROGRESS NOTES
Nutrition Follow-Up/Recommendations:    Noted pt now with elevated potassium level, Creatinine elevated, consider transitioning TF to Nepro with goal rate of 45mL/hr, which will provide 1944 kcals, 87g protein, 173g CHO (slight increase of 9g from what pt is currently receiving from Glucerna 1.2), 785mL water in TF formula.     Consider checking phosphorus level.     Defer adjustments in additional free water flushes to provider.     Per chart, last BM 2/8/24. Lactulose started 2/18. monitor need to further increase bowel regimen.

## 2024-02-20 ENCOUNTER — APPOINTMENT (INPATIENT)
Dept: NON INVASIVE DIAGNOSTICS | Facility: HOSPITAL | Age: 59
DRG: 003 | End: 2024-02-20
Payer: COMMERCIAL

## 2024-02-20 ENCOUNTER — APPOINTMENT (INPATIENT)
Dept: RADIOLOGY | Facility: HOSPITAL | Age: 59
DRG: 003 | End: 2024-02-20
Payer: COMMERCIAL

## 2024-02-20 ENCOUNTER — ANESTHESIA (INPATIENT)
Dept: PERIOP | Facility: HOSPITAL | Age: 59
DRG: 870 | End: 2024-02-20
Payer: COMMERCIAL

## 2024-02-20 ENCOUNTER — ANESTHESIA EVENT (INPATIENT)
Dept: PERIOP | Facility: HOSPITAL | Age: 59
DRG: 870 | End: 2024-02-20
Payer: COMMERCIAL

## 2024-02-20 PROBLEM — N17.9 ACUTE KIDNEY INJURY (HCC): Status: ACTIVE | Noted: 2024-02-20

## 2024-02-20 PROBLEM — K56.2 CECAL VOLVULUS (HCC): Status: ACTIVE | Noted: 2024-02-20

## 2024-02-20 LAB
25(OH)D3 SERPL-MCNC: 56.5 NG/ML (ref 30–100)
ABO GROUP BLD: NORMAL
ABO GROUP BLD: NORMAL
ALBUMIN SERPL BCP-MCNC: 3 G/DL (ref 3.5–5)
ALP SERPL-CCNC: 68 U/L (ref 34–104)
ALT SERPL W P-5'-P-CCNC: 18 U/L (ref 7–52)
ANION GAP SERPL CALCULATED.3IONS-SCNC: 10 MMOL/L
ANION GAP SERPL CALCULATED.3IONS-SCNC: 8 MMOL/L
ANION GAP SERPL CALCULATED.3IONS-SCNC: 9 MMOL/L
AORTIC ROOT: 3.8 CM
APICAL FOUR CHAMBER EJECTION FRACTION: 48 %
ASCENDING AORTA: 3.9 CM
AST SERPL W P-5'-P-CCNC: 20 U/L (ref 13–39)
BACTERIA UR QL AUTO: ABNORMAL /HPF
BASE EXCESS BLDA CALC-SCNC: -0.8 MMOL/L
BASE EXCESS BLDA CALC-SCNC: -1.1 MMOL/L
BASE EXCESS BLDA CALC-SCNC: 0.8 MMOL/L
BILIRUB DIRECT SERPL-MCNC: 0.16 MG/DL (ref 0–0.2)
BILIRUB SERPL-MCNC: 0.51 MG/DL (ref 0.2–1)
BILIRUB UR QL STRIP: NEGATIVE
BLD GP AB SCN SERPL QL: NEGATIVE
BSA FOR ECHO PROCEDURE: 1.83 M2
BUN SERPL-MCNC: 77 MG/DL (ref 5–25)
BUN SERPL-MCNC: 89 MG/DL (ref 5–25)
BUN SERPL-MCNC: 95 MG/DL (ref 5–25)
CA-I BLD-SCNC: 1.37 MMOL/L (ref 1.12–1.32)
CALCIUM SERPL-MCNC: 10.4 MG/DL (ref 8.4–10.2)
CALCIUM SERPL-MCNC: 11.6 MG/DL (ref 8.4–10.2)
CALCIUM SERPL-MCNC: 11.8 MG/DL (ref 8.4–10.2)
CHLORIDE SERPL-SCNC: 110 MMOL/L (ref 96–108)
CHLORIDE SERPL-SCNC: 113 MMOL/L (ref 96–108)
CHLORIDE SERPL-SCNC: 115 MMOL/L (ref 96–108)
CLARITY UR: CLEAR
CO2 SERPL-SCNC: 27 MMOL/L (ref 21–32)
CO2 SERPL-SCNC: 27 MMOL/L (ref 21–32)
CO2 SERPL-SCNC: 28 MMOL/L (ref 21–32)
COLOR UR: ABNORMAL
CREAT SERPL-MCNC: 1.5 MG/DL (ref 0.6–1.3)
CREAT SERPL-MCNC: 1.56 MG/DL (ref 0.6–1.3)
CREAT SERPL-MCNC: 1.62 MG/DL (ref 0.6–1.3)
CRP SERPL QL: 94.2 MG/L
E WAVE DECELERATION TIME: 105 MS
E/A RATIO: 0.65
ERYTHROCYTE [DISTWIDTH] IN BLOOD BY AUTOMATED COUNT: 21.2 % (ref 11.6–15.1)
FIO2: 55 %
FRACTIONAL SHORTENING: 24 (ref 28–44)
GALACTOMANNAN AG SPEC IA-ACNC: 0.06 INDEX (ref 0–0.49)
GFR SERPL CREATININE-BSD FRML MDRD: 34 ML/MIN/1.73SQ M
GFR SERPL CREATININE-BSD FRML MDRD: 36 ML/MIN/1.73SQ M
GFR SERPL CREATININE-BSD FRML MDRD: 38 ML/MIN/1.73SQ M
GLUCOSE SERPL-MCNC: 106 MG/DL (ref 65–140)
GLUCOSE SERPL-MCNC: 118 MG/DL (ref 65–140)
GLUCOSE SERPL-MCNC: 149 MG/DL (ref 65–140)
GLUCOSE SERPL-MCNC: 155 MG/DL (ref 65–140)
GLUCOSE SERPL-MCNC: 175 MG/DL (ref 65–140)
GLUCOSE SERPL-MCNC: 185 MG/DL (ref 65–140)
GLUCOSE SERPL-MCNC: 188 MG/DL (ref 65–140)
GLUCOSE SERPL-MCNC: 190 MG/DL (ref 65–140)
GLUCOSE SERPL-MCNC: 192 MG/DL (ref 65–140)
GLUCOSE SERPL-MCNC: 202 MG/DL (ref 65–140)
GLUCOSE SERPL-MCNC: 362 MG/DL (ref 65–140)
GLUCOSE SERPL-MCNC: 90 MG/DL (ref 65–140)
GLUCOSE UR STRIP-MCNC: NEGATIVE MG/DL
HCO3 BLDA-SCNC: 23.9 MMOL/L (ref 22–28)
HCO3 BLDA-SCNC: 23.9 MMOL/L (ref 22–28)
HCO3 BLDA-SCNC: 27.5 MMOL/L (ref 22–28)
HCT VFR BLD AUTO: 24.5 % (ref 34.8–46.1)
HGB BLD-MCNC: 7.9 G/DL (ref 11.5–15.4)
HGB UR QL STRIP.AUTO: ABNORMAL
HOROWITZ INDEX BLDA+IHG-RTO: 55 MM[HG]
HYALINE CASTS #/AREA URNS LPF: ABNORMAL /LPF
INTERVENTRICULAR SEPTUM IN DIASTOLE (PARASTERNAL SHORT AXIS VIEW): 1 CM
INTERVENTRICULAR SEPTUM: 1 CM (ref 0.6–1.1)
KETONES UR STRIP-MCNC: NEGATIVE MG/DL
LAAS-AP2: 6 CM2
LAAS-AP4: 7.1 CM2
LACTATE SERPL-SCNC: 2.7 MMOL/L (ref 0.5–2)
LACTATE SERPL-SCNC: 2.9 MMOL/L (ref 0.5–2)
LEFT ATRIUM SIZE: 1.9 CM
LEFT ATRIUM VOLUME (MOD BIPLANE): 11 ML
LEFT ATRIUM VOLUME INDEX (MOD BIPLANE): 6 ML/M2
LEFT INTERNAL DIMENSION IN SYSTOLE: 3.1 CM (ref 2.1–4)
LEFT VENTRICULAR INTERNAL DIMENSION IN DIASTOLE: 4.1 CM (ref 3.5–6)
LEFT VENTRICULAR POSTERIOR WALL IN END DIASTOLE: 1 CM
LEFT VENTRICULAR STROKE VOLUME: 37 ML
LEUKOCYTE ESTERASE UR QL STRIP: NEGATIVE
LVSV (TEICH): 37 ML
MAGNESIUM SERPL-MCNC: 2.5 MG/DL (ref 1.9–2.7)
MAGNESIUM SERPL-MCNC: 3 MG/DL (ref 1.9–2.7)
MCH RBC QN AUTO: 30.7 PG (ref 26.8–34.3)
MCHC RBC AUTO-ENTMCNC: 32.2 G/DL (ref 31.4–37.4)
MCV RBC AUTO: 95 FL (ref 82–98)
MV E'TISSUE VEL-LAT: 9 CM/S
MV E'TISSUE VEL-SEP: 8 CM/S
MV PEAK A VEL: 0.69 M/S
MV PEAK E VEL: 45 CM/S
MV STENOSIS PRESSURE HALF TIME: 30 MS
MV VALVE AREA P 1/2 METHOD: 7.33
MYCOBACTERIUM SPEC CULT: NORMAL
NITRITE UR QL STRIP: NEGATIVE
NON-SQ EPI CELLS URNS QL MICRO: ABNORMAL /HPF
NRBC BLD AUTO-RTO: 3 /100 WBCS
O2 CT BLDA-SCNC: 10.1 ML/DL (ref 16–23)
O2 CT BLDA-SCNC: 10.2 ML/DL (ref 16–23)
O2 CT BLDA-SCNC: 11.7 ML/DL (ref 16–23)
OSMOLALITY UR/SERPL-RTO: 348 MMOL/KG (ref 282–298)
OSMOLALITY UR/SERPL-RTO: 350 MMOL/KG (ref 282–298)
OSMOLALITY UR/SERPL-RTO: 353 MMOL/KG (ref 282–298)
OSMOLALITY UR/SERPL-RTO: 358 MMOL/KG (ref 282–298)
OSMOLALITY UR/SERPL-RTO: 362 MMOL/KG (ref 282–298)
OSMOLALITY UR/SERPL-RTO: 366 MMOL/KG (ref 282–298)
OXYHGB MFR BLDA: 90.1 % (ref 94–97)
OXYHGB MFR BLDA: 90.9 % (ref 94–97)
OXYHGB MFR BLDA: 91.4 % (ref 94–97)
PCO2 BLDA: 39.5 MM HG (ref 36–44)
PCO2 BLDA: 41.4 MM HG (ref 36–44)
PCO2 BLDA: 55.4 MM HG (ref 36–44)
PEEP RESPIRATORY: 10 CM[H2O]
PH BLDA: 7.31 [PH] (ref 7.35–7.45)
PH BLDA: 7.38 [PH] (ref 7.35–7.45)
PH BLDA: 7.4 [PH] (ref 7.35–7.45)
PH UR STRIP.AUTO: 6 [PH]
PHOSPHATE SERPL-MCNC: 4 MG/DL (ref 2.7–4.5)
PLATELET # BLD AUTO: 363 THOUSANDS/UL (ref 149–390)
PMV BLD AUTO: 10.9 FL (ref 8.9–12.7)
PNEUMOCYSTIS PCR: NEGATIVE
PO2 BLDA: 65.9 MM HG (ref 75–129)
PO2 BLDA: 70 MM HG (ref 75–129)
PO2 BLDA: 70.9 MM HG (ref 75–129)
POTASSIUM SERPL-SCNC: 4.9 MMOL/L (ref 3.5–5.3)
POTASSIUM SERPL-SCNC: 6.1 MMOL/L (ref 3.5–5.3)
POTASSIUM SERPL-SCNC: 6.1 MMOL/L (ref 3.5–5.3)
PROT SERPL-MCNC: 6.9 G/DL (ref 6.4–8.4)
PROT UR STRIP-MCNC: NEGATIVE MG/DL
PTH-INTACT SERPL-MCNC: 83.4 PG/ML (ref 12–88)
RBC # BLD AUTO: 2.57 MILLION/UL (ref 3.81–5.12)
RBC #/AREA URNS AUTO: ABNORMAL /HPF
RH BLD: NEGATIVE
RH BLD: NEGATIVE
RHODAMINE-AURAMINE STN SPEC: NORMAL
RIGHT ATRIUM AREA SYSTOLE A4C: 5.8 CM2
RIGHT VENTRICLE ID DIMENSION: 2.8 CM
SL CV LEFT ATRIUM LENGTH A2C: 3.2 CM
SL CV LV EF: 70
SL CV PED ECHO LEFT VENTRICLE DIASTOLIC VOLUME (MOD BIPLANE) 2D: 74 ML
SL CV PED ECHO LEFT VENTRICLE SYSTOLIC VOLUME (MOD BIPLANE) 2D: 37 ML
SODIUM SERPL-SCNC: 148 MMOL/L (ref 135–147)
SODIUM SERPL-SCNC: 148 MMOL/L (ref 135–147)
SODIUM SERPL-SCNC: 151 MMOL/L (ref 135–147)
SP GR UR STRIP.AUTO: 1.02 (ref 1–1.03)
SPECIMEN EXPIRATION DATE: NORMAL
SPECIMEN SOURCE: ABNORMAL
TLOW: 400
TRIGL SERPL-MCNC: 305 MG/DL
UROBILINOGEN UR STRIP-ACNC: <2 MG/DL
VENT APRV: 16
VENT- AC: AC
VENT- APRV: ABNORMAL
WBC # BLD AUTO: 25.15 THOUSAND/UL (ref 4.31–10.16)
WBC #/AREA URNS AUTO: ABNORMAL /HPF

## 2024-02-20 PROCEDURE — 81001 URINALYSIS AUTO W/SCOPE: CPT | Performed by: STUDENT IN AN ORGANIZED HEALTH CARE EDUCATION/TRAINING PROGRAM

## 2024-02-20 PROCEDURE — 94003 VENT MGMT INPAT SUBQ DAY: CPT

## 2024-02-20 PROCEDURE — 86901 BLOOD TYPING SEROLOGIC RH(D): CPT | Performed by: STUDENT IN AN ORGANIZED HEALTH CARE EDUCATION/TRAINING PROGRAM

## 2024-02-20 PROCEDURE — 84132 ASSAY OF SERUM POTASSIUM: CPT

## 2024-02-20 PROCEDURE — 86140 C-REACTIVE PROTEIN: CPT

## 2024-02-20 PROCEDURE — 85007 BL SMEAR W/DIFF WBC COUNT: CPT | Performed by: STUDENT IN AN ORGANIZED HEALTH CARE EDUCATION/TRAINING PROGRAM

## 2024-02-20 PROCEDURE — 99233 SBSQ HOSP IP/OBS HIGH 50: CPT | Performed by: INTERNAL MEDICINE

## 2024-02-20 PROCEDURE — 85014 HEMATOCRIT: CPT

## 2024-02-20 PROCEDURE — 30233N1 TRANSFUSION OF NONAUTOLOGOUS RED BLOOD CELLS INTO PERIPHERAL VEIN, PERCUTANEOUS APPROACH: ICD-10-PCS | Performed by: INTERNAL MEDICINE

## 2024-02-20 PROCEDURE — 02HV33Z INSERTION OF INFUSION DEVICE INTO SUPERIOR VENA CAVA, PERCUTANEOUS APPROACH: ICD-10-PCS | Performed by: RADIOLOGY

## 2024-02-20 PROCEDURE — 0D1E0Z4 BYPASS LARGE INTESTINE TO CUTANEOUS, OPEN APPROACH: ICD-10-PCS | Performed by: STUDENT IN AN ORGANIZED HEALTH CARE EDUCATION/TRAINING PROGRAM

## 2024-02-20 PROCEDURE — 0D1B0Z4 BYPASS ILEUM TO CUTANEOUS, OPEN APPROACH: ICD-10-PCS | Performed by: STUDENT IN AN ORGANIZED HEALTH CARE EDUCATION/TRAINING PROGRAM

## 2024-02-20 PROCEDURE — 93306 TTE W/DOPPLER COMPLETE: CPT

## 2024-02-20 PROCEDURE — 82330 ASSAY OF CALCIUM: CPT | Performed by: STUDENT IN AN ORGANIZED HEALTH CARE EDUCATION/TRAINING PROGRAM

## 2024-02-20 PROCEDURE — 99291 CRITICAL CARE FIRST HOUR: CPT | Performed by: INTERNAL MEDICINE

## 2024-02-20 PROCEDURE — 80076 HEPATIC FUNCTION PANEL: CPT | Performed by: STUDENT IN AN ORGANIZED HEALTH CARE EDUCATION/TRAINING PROGRAM

## 2024-02-20 PROCEDURE — 86920 COMPATIBILITY TEST SPIN: CPT

## 2024-02-20 PROCEDURE — 86850 RBC ANTIBODY SCREEN: CPT | Performed by: STUDENT IN AN ORGANIZED HEALTH CARE EDUCATION/TRAINING PROGRAM

## 2024-02-20 PROCEDURE — 83930 ASSAY OF BLOOD OSMOLALITY: CPT | Performed by: STUDENT IN AN ORGANIZED HEALTH CARE EDUCATION/TRAINING PROGRAM

## 2024-02-20 PROCEDURE — 0T2BX0Z CHANGE DRAINAGE DEVICE IN BLADDER, EXTERNAL APPROACH: ICD-10-PCS | Performed by: INTERNAL MEDICINE

## 2024-02-20 PROCEDURE — 83970 ASSAY OF PARATHORMONE: CPT | Performed by: STUDENT IN AN ORGANIZED HEALTH CARE EDUCATION/TRAINING PROGRAM

## 2024-02-20 PROCEDURE — 84165 PROTEIN E-PHORESIS SERUM: CPT | Performed by: STUDENT IN AN ORGANIZED HEALTH CARE EDUCATION/TRAINING PROGRAM

## 2024-02-20 PROCEDURE — 99291 CRITICAL CARE FIRST HOUR: CPT | Performed by: STUDENT IN AN ORGANIZED HEALTH CARE EDUCATION/TRAINING PROGRAM

## 2024-02-20 PROCEDURE — 71045 X-RAY EXAM CHEST 1 VIEW: CPT

## 2024-02-20 PROCEDURE — 83521 IG LIGHT CHAINS FREE EACH: CPT | Performed by: STUDENT IN AN ORGANIZED HEALTH CARE EDUCATION/TRAINING PROGRAM

## 2024-02-20 PROCEDURE — 93306 TTE W/DOPPLER COMPLETE: CPT | Performed by: INTERNAL MEDICINE

## 2024-02-20 PROCEDURE — 88307 TISSUE EXAM BY PATHOLOGIST: CPT | Performed by: STUDENT IN AN ORGANIZED HEALTH CARE EDUCATION/TRAINING PROGRAM

## 2024-02-20 PROCEDURE — 82306 VITAMIN D 25 HYDROXY: CPT | Performed by: STUDENT IN AN ORGANIZED HEALTH CARE EDUCATION/TRAINING PROGRAM

## 2024-02-20 PROCEDURE — 84295 ASSAY OF SERUM SODIUM: CPT

## 2024-02-20 PROCEDURE — 83735 ASSAY OF MAGNESIUM: CPT | Performed by: STUDENT IN AN ORGANIZED HEALTH CARE EDUCATION/TRAINING PROGRAM

## 2024-02-20 PROCEDURE — 80048 BASIC METABOLIC PNL TOTAL CA: CPT | Performed by: STUDENT IN AN ORGANIZED HEALTH CARE EDUCATION/TRAINING PROGRAM

## 2024-02-20 PROCEDURE — 82805 BLOOD GASES W/O2 SATURATION: CPT | Performed by: STUDENT IN AN ORGANIZED HEALTH CARE EDUCATION/TRAINING PROGRAM

## 2024-02-20 PROCEDURE — 84100 ASSAY OF PHOSPHORUS: CPT | Performed by: STUDENT IN AN ORGANIZED HEALTH CARE EDUCATION/TRAINING PROGRAM

## 2024-02-20 PROCEDURE — 86335 IMMUNFIX E-PHORSIS/URINE/CSF: CPT | Performed by: STUDENT IN AN ORGANIZED HEALTH CARE EDUCATION/TRAINING PROGRAM

## 2024-02-20 PROCEDURE — P9016 RBC LEUKOCYTES REDUCED: HCPCS

## 2024-02-20 PROCEDURE — 82947 ASSAY GLUCOSE BLOOD QUANT: CPT

## 2024-02-20 PROCEDURE — 82948 REAGENT STRIP/BLOOD GLUCOSE: CPT

## 2024-02-20 PROCEDURE — 85027 COMPLETE CBC AUTOMATED: CPT | Performed by: STUDENT IN AN ORGANIZED HEALTH CARE EDUCATION/TRAINING PROGRAM

## 2024-02-20 PROCEDURE — 71250 CT THORAX DX C-: CPT

## 2024-02-20 PROCEDURE — 82803 BLOOD GASES ANY COMBINATION: CPT

## 2024-02-20 PROCEDURE — 74176 CT ABD & PELVIS W/O CONTRAST: CPT

## 2024-02-20 PROCEDURE — 83605 ASSAY OF LACTIC ACID: CPT | Performed by: STUDENT IN AN ORGANIZED HEALTH CARE EDUCATION/TRAINING PROGRAM

## 2024-02-20 PROCEDURE — 94760 N-INVAS EAR/PLS OXIMETRY 1: CPT

## 2024-02-20 PROCEDURE — 99232 SBSQ HOSP IP/OBS MODERATE 35: CPT | Performed by: PHYSICIAN ASSISTANT

## 2024-02-20 PROCEDURE — 82330 ASSAY OF CALCIUM: CPT

## 2024-02-20 PROCEDURE — 86900 BLOOD TYPING SEROLOGIC ABO: CPT | Performed by: NURSE ANESTHETIST, CERTIFIED REGISTERED

## 2024-02-20 PROCEDURE — 82652 VIT D 1 25-DIHYDROXY: CPT | Performed by: STUDENT IN AN ORGANIZED HEALTH CARE EDUCATION/TRAINING PROGRAM

## 2024-02-20 PROCEDURE — 86900 BLOOD TYPING SEROLOGIC ABO: CPT | Performed by: STUDENT IN AN ORGANIZED HEALTH CARE EDUCATION/TRAINING PROGRAM

## 2024-02-20 PROCEDURE — 86901 BLOOD TYPING SEROLOGIC RH(D): CPT | Performed by: NURSE ANESTHETIST, CERTIFIED REGISTERED

## 2024-02-20 PROCEDURE — 0DBH0ZZ EXCISION OF CECUM, OPEN APPROACH: ICD-10-PCS | Performed by: STUDENT IN AN ORGANIZED HEALTH CARE EDUCATION/TRAINING PROGRAM

## 2024-02-20 PROCEDURE — 99223 1ST HOSP IP/OBS HIGH 75: CPT | Performed by: INTERNAL MEDICINE

## 2024-02-20 PROCEDURE — 84166 PROTEIN E-PHORESIS/URINE/CSF: CPT | Performed by: STUDENT IN AN ORGANIZED HEALTH CARE EDUCATION/TRAINING PROGRAM

## 2024-02-20 PROCEDURE — 84478 ASSAY OF TRIGLYCERIDES: CPT | Performed by: STUDENT IN AN ORGANIZED HEALTH CARE EDUCATION/TRAINING PROGRAM

## 2024-02-20 PROCEDURE — 44144 PARTIAL REMOVAL OF COLON: CPT | Performed by: STUDENT IN AN ORGANIZED HEALTH CARE EDUCATION/TRAINING PROGRAM

## 2024-02-20 PROCEDURE — G1004 CDSM NDSC: HCPCS

## 2024-02-20 RX ORDER — DEXTROSE MONOHYDRATE 25 G/50ML
50 INJECTION, SOLUTION INTRAVENOUS ONCE
Status: COMPLETED | OUTPATIENT
Start: 2024-02-20 | End: 2024-02-20

## 2024-02-20 RX ORDER — FENTANYL CITRATE 50 UG/ML
INJECTION, SOLUTION INTRAMUSCULAR; INTRAVENOUS AS NEEDED
Status: DISCONTINUED | OUTPATIENT
Start: 2024-02-20 | End: 2024-02-20

## 2024-02-20 RX ORDER — ROCURONIUM BROMIDE 10 MG/ML
INJECTION, SOLUTION INTRAVENOUS AS NEEDED
Status: DISCONTINUED | OUTPATIENT
Start: 2024-02-20 | End: 2024-02-20

## 2024-02-20 RX ORDER — MIDAZOLAM HYDROCHLORIDE 2 MG/2ML
4 INJECTION, SOLUTION INTRAMUSCULAR; INTRAVENOUS ONCE
Status: COMPLETED | OUTPATIENT
Start: 2024-02-20 | End: 2024-02-20

## 2024-02-20 RX ORDER — CALCIUM GLUCONATE 20 MG/ML
1 INJECTION, SOLUTION INTRAVENOUS ONCE
Status: DISCONTINUED | OUTPATIENT
Start: 2024-02-20 | End: 2024-02-20

## 2024-02-20 RX ORDER — MIDAZOLAM HYDROCHLORIDE 2 MG/2ML
INJECTION, SOLUTION INTRAMUSCULAR; INTRAVENOUS
Status: COMPLETED
Start: 2024-02-20 | End: 2024-02-20

## 2024-02-20 RX ORDER — SODIUM CHLORIDE, SODIUM GLUCONATE, SODIUM ACETATE, POTASSIUM CHLORIDE, MAGNESIUM CHLORIDE, SODIUM PHOSPHATE, DIBASIC, AND POTASSIUM PHOSPHATE .53; .5; .37; .037; .03; .012; .00082 G/100ML; G/100ML; G/100ML; G/100ML; G/100ML; G/100ML; G/100ML
500 INJECTION, SOLUTION INTRAVENOUS ONCE
Status: COMPLETED | OUTPATIENT
Start: 2024-02-20 | End: 2024-02-20

## 2024-02-20 RX ORDER — LORAZEPAM 2 MG/ML
1 INJECTION INTRAMUSCULAR ONCE
Status: COMPLETED | OUTPATIENT
Start: 2024-02-20 | End: 2024-02-20

## 2024-02-20 RX ORDER — CEFAZOLIN SODIUM 2 G/50ML
SOLUTION INTRAVENOUS AS NEEDED
Status: DISCONTINUED | OUTPATIENT
Start: 2024-02-20 | End: 2024-02-20

## 2024-02-20 RX ORDER — METRONIDAZOLE 500 MG/100ML
INJECTION, SOLUTION INTRAVENOUS CONTINUOUS PRN
Status: DISCONTINUED | OUTPATIENT
Start: 2024-02-20 | End: 2024-02-20

## 2024-02-20 RX ORDER — PHENYLEPHRINE HYDROCHLORIDE 10 MG/ML
INJECTION INTRAVENOUS
Status: DISPENSED
Start: 2024-02-20 | End: 2024-02-20

## 2024-02-20 RX ORDER — MIDAZOLAM HYDROCHLORIDE 2 MG/2ML
2 INJECTION, SOLUTION INTRAMUSCULAR; INTRAVENOUS EVERY 4 HOURS PRN
Status: DISCONTINUED | OUTPATIENT
Start: 2024-02-20 | End: 2024-03-01

## 2024-02-20 RX ORDER — SODIUM CHLORIDE 9 MG/ML
INJECTION, SOLUTION INTRAVENOUS CONTINUOUS PRN
Status: DISCONTINUED | OUTPATIENT
Start: 2024-02-20 | End: 2024-02-20

## 2024-02-20 RX ORDER — HEPARIN SODIUM 5000 [USP'U]/ML
5000 INJECTION, SOLUTION INTRAVENOUS; SUBCUTANEOUS EVERY 8 HOURS SCHEDULED
Status: DISCONTINUED | OUTPATIENT
Start: 2024-02-20 | End: 2024-02-24

## 2024-02-20 RX ORDER — INSULIN LISPRO 100 [IU]/ML
1-5 INJECTION, SOLUTION INTRAVENOUS; SUBCUTANEOUS EVERY 6 HOURS SCHEDULED
Status: DISCONTINUED | OUTPATIENT
Start: 2024-02-20 | End: 2024-02-20

## 2024-02-20 RX ORDER — INSULIN LISPRO 100 [IU]/ML
1-5 INJECTION, SOLUTION INTRAVENOUS; SUBCUTANEOUS EVERY 6 HOURS SCHEDULED
Status: DISCONTINUED | OUTPATIENT
Start: 2024-02-21 | End: 2024-02-21

## 2024-02-20 RX ORDER — ALBUMIN, HUMAN INJ 5% 5 %
SOLUTION INTRAVENOUS CONTINUOUS PRN
Status: DISCONTINUED | OUTPATIENT
Start: 2024-02-20 | End: 2024-02-20

## 2024-02-20 RX ORDER — DEXTROSE MONOHYDRATE 25 G/50ML
INJECTION, SOLUTION INTRAVENOUS AS NEEDED
Status: DISCONTINUED | OUTPATIENT
Start: 2024-02-20 | End: 2024-02-20

## 2024-02-20 RX ORDER — DEXTROSE MONOHYDRATE 50 MG/ML
100 INJECTION, SOLUTION INTRAVENOUS CONTINUOUS
Status: DISCONTINUED | OUTPATIENT
Start: 2024-02-20 | End: 2024-02-20

## 2024-02-20 RX ORDER — DEXTROSE MONOHYDRATE 25 G/50ML
50 INJECTION, SOLUTION INTRAVENOUS ONCE
Status: DISCONTINUED | OUTPATIENT
Start: 2024-02-20 | End: 2024-02-21

## 2024-02-20 RX ORDER — PROPOFOL 10 MG/ML
INJECTION, EMULSION INTRAVENOUS CONTINUOUS PRN
Status: DISCONTINUED | OUTPATIENT
Start: 2024-02-20 | End: 2024-02-20

## 2024-02-20 RX ORDER — MAGNESIUM HYDROXIDE 1200 MG/15ML
LIQUID ORAL AS NEEDED
Status: DISCONTINUED | OUTPATIENT
Start: 2024-02-20 | End: 2024-02-20 | Stop reason: HOSPADM

## 2024-02-20 RX ORDER — SODIUM CHLORIDE 450 MG/100ML
125 INJECTION, SOLUTION INTRAVENOUS CONTINUOUS
Status: DISCONTINUED | OUTPATIENT
Start: 2024-02-20 | End: 2024-02-20

## 2024-02-20 RX ADMIN — NYSTATIN: 100000 POWDER TOPICAL at 17:31

## 2024-02-20 RX ADMIN — INSULIN LISPRO 1 UNITS: 100 INJECTION, SOLUTION INTRAVENOUS; SUBCUTANEOUS at 17:38

## 2024-02-20 RX ADMIN — PROPOFOL 50 MCG/KG/MIN: 10 INJECTION, EMULSION INTRAVENOUS at 16:34

## 2024-02-20 RX ADMIN — DEXMEDETOMIDINE HYDROCHLORIDE 1.2 MCG/KG/HR: 4 INJECTION, SOLUTION INTRAVENOUS at 20:36

## 2024-02-20 RX ADMIN — DEXMEDETOMIDINE HYDROCHLORIDE 1.2 MCG/KG/HR: 4 INJECTION, SOLUTION INTRAVENOUS at 07:46

## 2024-02-20 RX ADMIN — Medication 1 APPLICATION: at 17:32

## 2024-02-20 RX ADMIN — MIDAZOLAM 4 MG: 1 INJECTION INTRAMUSCULAR; INTRAVENOUS at 13:30

## 2024-02-20 RX ADMIN — VENLAFAXINE 25 MG: 25 TABLET ORAL at 12:07

## 2024-02-20 RX ADMIN — FAMOTIDINE 20 MG: 40 POWDER, FOR SUSPENSION ORAL at 17:31

## 2024-02-20 RX ADMIN — Medication 1 SPRAY: at 06:40

## 2024-02-20 RX ADMIN — METHYLPREDNISOLONE SODIUM SUCCINATE 125 MG: 125 INJECTION, POWDER, FOR SOLUTION INTRAMUSCULAR; INTRAVENOUS at 08:03

## 2024-02-20 RX ADMIN — SODIUM CHLORIDE 150 ML/HR: 4 INJECTION, SOLUTION, CONCENTRATE INTRAVENOUS at 23:29

## 2024-02-20 RX ADMIN — Medication 1 APPLICATION: at 12:07

## 2024-02-20 RX ADMIN — LORAZEPAM 1 MG: 2 INJECTION INTRAMUSCULAR; INTRAVENOUS at 00:36

## 2024-02-20 RX ADMIN — ROCURONIUM BROMIDE 20 MG: 10 INJECTION, SOLUTION INTRAVENOUS at 15:42

## 2024-02-20 RX ADMIN — Medication 1 APPLICATION: at 22:00

## 2024-02-20 RX ADMIN — MIDAZOLAM 2 MG: 1 INJECTION INTRAMUSCULAR; INTRAVENOUS at 20:09

## 2024-02-20 RX ADMIN — Medication 1 APPLICATION: at 06:40

## 2024-02-20 RX ADMIN — NOREPINEPHRINE BITARTRATE 2 MCG/MIN: 1 SOLUTION INTRAVENOUS at 20:36

## 2024-02-20 RX ADMIN — NYSTATIN: 100000 POWDER TOPICAL at 08:03

## 2024-02-20 RX ADMIN — Medication 1 SPRAY: at 21:59

## 2024-02-20 RX ADMIN — DEXTROSE MONOHYDRATE 50 ML: 25 INJECTION, SOLUTION INTRAVENOUS at 07:45

## 2024-02-20 RX ADMIN — Medication 1 APPLICATION: at 02:00

## 2024-02-20 RX ADMIN — Medication 1 APPLICATION: at 20:09

## 2024-02-20 RX ADMIN — ACETAMINOPHEN 1000 MG: 10 INJECTION INTRAVENOUS at 21:59

## 2024-02-20 RX ADMIN — SODIUM CHLORIDE, SODIUM GLUCONATE, SODIUM ACETATE, POTASSIUM CHLORIDE, MAGNESIUM CHLORIDE, SODIUM PHOSPHATE, DIBASIC, AND POTASSIUM PHOSPHATE 500 ML: .53; .5; .37; .037; .03; .012; .00082 INJECTION, SOLUTION INTRAVENOUS at 04:55

## 2024-02-20 RX ADMIN — LACTULOSE 20 G: 20 SOLUTION ORAL at 08:03

## 2024-02-20 RX ADMIN — DEXMEDETOMIDINE HYDROCHLORIDE 1.2 MCG/KG/HR: 4 INJECTION, SOLUTION INTRAVENOUS at 13:06

## 2024-02-20 RX ADMIN — Medication 1 SPRAY: at 10:11

## 2024-02-20 RX ADMIN — VENLAFAXINE 25 MG: 25 TABLET ORAL at 07:47

## 2024-02-20 RX ADMIN — METRONIDAZOLE: 500 SOLUTION INTRAVENOUS at 14:08

## 2024-02-20 RX ADMIN — NOREPINEPHRINE BITARTRATE 3 MCG/MIN: 1 INJECTION, SOLUTION, CONCENTRATE INTRAVENOUS at 14:20

## 2024-02-20 RX ADMIN — MIDAZOLAM HYDROCHLORIDE 4 MG: 2 INJECTION, SOLUTION INTRAMUSCULAR; INTRAVENOUS at 06:39

## 2024-02-20 RX ADMIN — ROCURONIUM BROMIDE 30 MG: 10 INJECTION, SOLUTION INTRAVENOUS at 16:34

## 2024-02-20 RX ADMIN — ACETAMINOPHEN 1000 MG: 10 INJECTION INTRAVENOUS at 06:14

## 2024-02-20 RX ADMIN — SODIUM CHLORIDE 125 ML/HR: 0.45 INJECTION, SOLUTION INTRAVENOUS at 12:07

## 2024-02-20 RX ADMIN — LACTULOSE 20 G: 20 SOLUTION ORAL at 20:36

## 2024-02-20 RX ADMIN — ALBUMIN (HUMAN): 12.5 INJECTION, SOLUTION INTRAVENOUS at 14:08

## 2024-02-20 RX ADMIN — CHLORHEXIDINE GLUCONATE 0.12% ORAL RINSE 15 ML: 1.2 LIQUID ORAL at 20:36

## 2024-02-20 RX ADMIN — SUGAMMADEX 200 MG: 100 INJECTION, SOLUTION INTRAVENOUS at 17:01

## 2024-02-20 RX ADMIN — MIDAZOLAM 4 MG: 1 INJECTION INTRAMUSCULAR; INTRAVENOUS at 06:39

## 2024-02-20 RX ADMIN — TOPIRAMATE 100 MG: 100 TABLET, FILM COATED ORAL at 17:31

## 2024-02-20 RX ADMIN — LAMOTRIGINE 150 MG: 100 TABLET ORAL at 17:31

## 2024-02-20 RX ADMIN — MIDAZOLAM 2 MG: 1 INJECTION INTRAMUSCULAR; INTRAVENOUS at 21:57

## 2024-02-20 RX ADMIN — SODIUM CHLORIDE 6 UNITS/HR: 9 INJECTION, SOLUTION INTRAVENOUS at 07:45

## 2024-02-20 RX ADMIN — HYDROMORPHONE HYDROCHLORIDE 0.5 MG: 1 INJECTION, SOLUTION INTRAMUSCULAR; INTRAVENOUS; SUBCUTANEOUS at 04:08

## 2024-02-20 RX ADMIN — CEFAZOLIN SODIUM 2000 MG: 2 SOLUTION INTRAVENOUS at 14:01

## 2024-02-20 RX ADMIN — Medication 1 SPRAY: at 20:10

## 2024-02-20 RX ADMIN — PHENYLEPHRINE HYDROCHLORIDE 25 MCG/MIN: 10 INJECTION INTRAVENOUS at 07:53

## 2024-02-20 RX ADMIN — INSULIN HUMAN 10 UNITS: 100 INJECTION, SOLUTION PARENTERAL at 07:45

## 2024-02-20 RX ADMIN — FENTANYL CITRATE 50 MCG: 50 INJECTION INTRAMUSCULAR; INTRAVENOUS at 14:59

## 2024-02-20 RX ADMIN — SODIUM CHLORIDE, SODIUM GLUCONATE, SODIUM ACETATE, POTASSIUM CHLORIDE, MAGNESIUM CHLORIDE, SODIUM PHOSPHATE, DIBASIC, AND POTASSIUM PHOSPHATE 100 ML/HR: .53; .5; .37; .037; .03; .012; .00082 INJECTION, SOLUTION INTRAVENOUS at 07:44

## 2024-02-20 RX ADMIN — Medication 1 APPLICATION: at 04:08

## 2024-02-20 RX ADMIN — FAMOTIDINE 20 MG: 40 POWDER, FOR SUSPENSION ORAL at 08:03

## 2024-02-20 RX ADMIN — QUETIAPINE FUMARATE 12.5 MG: 25 TABLET ORAL at 21:59

## 2024-02-20 RX ADMIN — Medication 1 APPLICATION: at 10:10

## 2024-02-20 RX ADMIN — ALBUMIN (HUMAN): 12.5 INJECTION, SOLUTION INTRAVENOUS at 14:54

## 2024-02-20 RX ADMIN — DEXMEDETOMIDINE HYDROCHLORIDE 0.3 MCG/KG/HR: 4 INJECTION, SOLUTION INTRAVENOUS at 01:16

## 2024-02-20 RX ADMIN — HEPARIN SODIUM 5000 UNITS: 5000 INJECTION INTRAVENOUS; SUBCUTANEOUS at 21:59

## 2024-02-20 RX ADMIN — METHYLPREDNISOLONE SODIUM SUCCINATE 125 MG: 125 INJECTION, POWDER, FOR SOLUTION INTRAMUSCULAR; INTRAVENOUS at 20:36

## 2024-02-20 RX ADMIN — CHLORHEXIDINE GLUCONATE 0.12% ORAL RINSE 15 ML: 1.2 LIQUID ORAL at 08:03

## 2024-02-20 RX ADMIN — HEPARIN SODIUM 5000 UNITS: 5000 INJECTION INTRAVENOUS; SUBCUTANEOUS at 07:46

## 2024-02-20 RX ADMIN — INSULIN HUMAN 6 UNITS: 100 INJECTION, SOLUTION PARENTERAL at 14:22

## 2024-02-20 RX ADMIN — DEXTROSE MONOHYDRATE 25 G: 25 INJECTION, SOLUTION INTRAVENOUS at 14:25

## 2024-02-20 RX ADMIN — MIDAZOLAM 1 MG/HR: 5 INJECTION INTRAMUSCULAR; INTRAVENOUS at 18:24

## 2024-02-20 RX ADMIN — Medication 1 SPRAY: at 12:07

## 2024-02-20 RX ADMIN — HYDROMORPHONE HYDROCHLORIDE 0.5 MG: 1 INJECTION, SOLUTION INTRAMUSCULAR; INTRAVENOUS; SUBCUTANEOUS at 01:27

## 2024-02-20 RX ADMIN — POLYETHYLENE GLYCOL 3350 17 G: 17 POWDER, FOR SOLUTION ORAL at 08:03

## 2024-02-20 RX ADMIN — Medication 1 SPRAY: at 02:00

## 2024-02-20 RX ADMIN — TOPIRAMATE 100 MG: 100 TABLET, FILM COATED ORAL at 08:06

## 2024-02-20 RX ADMIN — Medication 1 SPRAY: at 17:32

## 2024-02-20 RX ADMIN — LAMOTRIGINE 150 MG: 100 TABLET ORAL at 08:03

## 2024-02-20 RX ADMIN — SODIUM CHLORIDE, SODIUM GLUCONATE, SODIUM ACETATE, POTASSIUM CHLORIDE, MAGNESIUM CHLORIDE, SODIUM PHOSPHATE, DIBASIC, AND POTASSIUM PHOSPHATE 500 ML: .53; .5; .37; .037; .03; .012; .00082 INJECTION, SOLUTION INTRAVENOUS at 02:26

## 2024-02-20 RX ADMIN — SODIUM CHLORIDE: 0.9 INJECTION, SOLUTION INTRAVENOUS at 14:26

## 2024-02-20 RX ADMIN — Medication 1 APPLICATION: at 07:46

## 2024-02-20 RX ADMIN — Medication 1 SPRAY: at 04:08

## 2024-02-20 RX ADMIN — ROCURONIUM BROMIDE 50 MG: 10 INJECTION, SOLUTION INTRAVENOUS at 14:00

## 2024-02-20 RX ADMIN — SODIUM CHLORIDE: 0.9 INJECTION, SOLUTION INTRAVENOUS at 14:00

## 2024-02-20 RX ADMIN — Medication 1 SPRAY: at 07:46

## 2024-02-20 RX ADMIN — SENNOSIDES, DOCUSATE SODIUM 2 TABLET: 8.6; 5 TABLET ORAL at 08:03

## 2024-02-20 NOTE — OCCUPATIONAL THERAPY NOTE
Occupational Therapy         Patient Name: Carla Hunt  Today's Date: 2/20/2024 02/20/24 1500   OT Last Visit   OT Visit Date 02/20/24   Note Type   Note Type Cancelled Session   Cancel Reasons Patient to operating room  (remains intubated - currently  in OR for abd sx - will sign off at this time- reconsult when pt medically stable and able to participate in therapy)       Shoshana Garcia

## 2024-02-20 NOTE — ANESTHESIA PREPROCEDURE EVALUATION
Procedure:  LAPAROSCOPY DIAGNOSTIC; Exploratory laparotomy; bowel resection; ostomy creation (Abdomen)    Admitted 12/2023 with covid pna, now with multiple bacterial superinfections  Intubated with FiO2 70-80%  Abdominal distention with chronic constipation  High dose steroids for presumed pneumonitis/pneumonia, currently 125 mg Solumedrol IV BID  Sedation transitioned to precedex and dilaudid due to hypertriglyceridemia with Propofol infusion    Access 2 PIVs, a line, ETT    Relevant Problems   CARDIO   (+) Hypertensive disorder      ENDO   (+) Disorder of parathyroid gland (HCC)      GI/HEPATIC   (+) Cecal volvulus (HCC)      /RENAL   (+) Acute kidney injury (HCC)   (+) Nephrolithiasis   (+) Stage 3b chronic kidney disease (CKD) (HCC)      HEMATOLOGY   (+) 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      Anesthesia Plan  ASA Score- 4 Emergent    Anesthesia Type- general with ASA Monitors.         Additional Monitors: arterial line and central venous line.    Airway Plan: ETT.    Comment: Discussed anesthesia plan, maintaining intubation, CVL placement, possible open abdomen. Need for transfusions. All questions answered.    Recent labs personally reviewed:  Lab Results       Component                Value               Date                       WBC                      25.15 (H)           02/20/2024                 HGB                      7.9 (L)             02/20/2024                 PLT                      363                 02/20/2024            Lab Results       Component                Value               Date                        NA                       142                 11/13/2017                 K                        6.1 (H)             02/20/2024                 BUN                      95 (H)              02/20/2024                 CREATININE               1.62 (H)            02/20/2024                 GLUCOSE                  139 (H)             08/17/2017            Lab Results       Component                Value               Date                       PTT                      32                  01/12/2024             Lab Results       Component                Value               Date                       INR                      1.21 (H)            01/18/2024              Blood type A      I, Lauren Castellon MD, have personally seen and evaluated the patient prior to anesthetic care.  I have reviewed the pre-anesthetic record, medical history, allergies, medications and any other medical records if appropriate to the anesthetic care.  If a CRNA is involved in the case, I have reviewed the CRNA assessment, if present, and agree. I consented the patient for general anesthesia with appropriate airway support as indicated. We reviewed the risks associated including PONV, sore throat, allergic reaction to anesthetics and management plan to address these issues. We discussed the indication and risks associated with any invasive monitors that would be placed. We discussed post op pain control and expectations. We discussed rare complications including hypoxia, perioperative cardiac and neurologic events, and death based on the patient's baseline risk. All questions and concerns were addressed.   .       Plan Factors-Exercise tolerance (METS): <4 METS.    Chart reviewed. EKG reviewed. Imaging results reviewed. Existing labs reviewed. Patient summary reviewed.    Patient is not a current smoker.  Patient did not smoke on day of surgery.    Obstructive sleep apnea risk education given perioperatively.        Induction-  intravenous and inhalational.    Postoperative Plan-     Informed Consent- Anesthetic plan and risks discussed with spouse.  I personally reviewed this patient with the CRNA. Discussed and agreed on the Anesthesia Plan with the CRNA..

## 2024-02-20 NOTE — ASSESSMENT & PLAN NOTE
Decisional apparatus:  Patient is not competent on my exam today.  If competence is lost, patient's substitute decision maker would default to spouse by [place] 169.  Advance Directive / Living Will / POLST:  None on file    PSC, including MSW support, will follow closely to continue to provide supportive care as clinical situation evolves

## 2024-02-20 NOTE — PHYSICAL THERAPY NOTE
Physical Therapy Cancellation Note    PT orders received chart review completed. Pt is currently intubated/sedated and not appropriate to participate in skilled PT at this time. PT will follow and eval as medically appropriate.     02/20/24 1500   Note Type   Note type Cancelled Session   Cancel Reasons Intubated/sedated       Tania Augustin, PT

## 2024-02-20 NOTE — PROGRESS NOTES
[facility]  Progress Note  Name: Carla Hunt I  MRN: [id number]  Unit/Bed#: OR POOL I Date of Admission: 1/10/2024   Date of Service: 2/20/2024 I Hospital Day: 41    Assessment/Plan   Goals of care, counseling/discussion  Assessment & Plan  Per previous discussion with palliative care provider, family aware of guarded prognosis. Spouse does have a clear limit that the patient has already placed which is no tracheostomy or long-term ventilator support. Immediate goal of proceeding to the OR given new finding of cecal volvulus.   Plan for ongoing conversation as clinical situation evolves.    Palliative care patient  Assessment & Plan  Decisional apparatus:  Patient is not competent on my exam today.  If competence is lost, patient's substitute decision maker would default to spouse by [place] Act 169.  Advance Directive / Living Will / POLST:  None on file    PSC, including MSW support, will follow closely to continue to provide supportive care as clinical situation evolves    Anxiety state  Assessment & Plan  Continue Effexor  low dose IV Ativan 0.5 mg q6H PRN  Otherwise per primary team           Interval history:       Patient found to have cecal volvulus and possible SBO for which she is planned to go to the OR urgently for diagnostic laparoscopy vs ex lap and possible colon resection and ostomy creation. Patient's  and brother are at bedside. Offered support. During time of encounter patient is sedated but comfortable appearing.    MEDICATIONS / ALLERGIES:     all current active meds have been reviewed    No Known Allergies    OBJECTIVE:    Physical Exam  Physical Exam  Constitutional:       Appearance: She is ill-appearing.   HENT:      Head: Atraumatic.   Eyes:      Conjunctiva/sclera: Conjunctivae normal.   Cardiovascular:      Rate and Rhythm: Tachycardia present.   Pulmonary:      Comments: Ventilated via ETT  Abdominal:      General: There is distension.    Skin:     General: Skin is dry.   Neurological:      Comments: sedated   Psychiatric:      Comments: calm         Lab Results:   Results from last 7 days   Lab Units 02/20/24  0449 02/19/24  0452 02/18/24  0537 02/17/24  0511   WBC Thousand/uL 25.15* 29.45* 27.26* 22.93*   HEMOGLOBIN g/dL 7.9* 8.3* 8.8* 9.5*   HEMATOCRIT % 24.5* 25.1* 24.9* 28.3*   PLATELETS Thousands/uL 363 349 355 268   MONO PCT %  --   --   --  1*   EOS PCT %  --   --   --  0     Results from last 7 days   Lab Units 02/20/24  1010 02/20/24  0449 02/19/24  1132 02/17/24  0511 02/16/24  1833   POTASSIUM mmol/L 6.1* 6.1* 5.8*   < >  --    CHLORIDE mmol/L 113* 110* 110*   < >  --    CO2 mmol/L 27 28 24   < >  --    BUN mg/dL 95* 89* 102*   < >  --    CREATININE mg/dL 1.62* 1.50* 1.69*   < >  --    CALCIUM mg/dL 11.8* 11.6* 10.6*   < >  --    ALK PHOS U/L 68  --   --   --  72   ALT U/L 18  --   --   --  24   AST U/L 20  --   --   --  17    < > = values in this interval not displayed.       Imaging Studies: reviewed pertinent studies   EKG, Pathology, and Other Studies: reviewed pertinent studies    Counseling / Coordination of Care    Total floor / unit time spent today 20 minutes. Greater than 50% of total time was spent with the patient and / or family counseling and / or coordination of care. A description of the counseling / coordination of care: time spent communicating with primary team, spouse and brother at bedside, providing support.

## 2024-02-20 NOTE — CONSULTS
Consultation - Nephrology   Carla Hunt 59 y.o. female MRN: [id number]  Unit/Bed#: MICU 10 Encounter: [id number]    ASSESSMENT:  Acute Kidney Injury- secondary to ATN from bactrim + dehydration + urinary retention  Baseline creatinine 1.1-1.3  Change IVF from NS to 1/2NS and increase rate  Avoid further bactrim  Marie catheter replaced and output was 2L  Imaging: Moderate bilateral hydroureteronephrosis presumably due to significantly distended urinary bladder   UA: large blood, trace protein, moderate leuks, 10-25 granular casts  Avoid hypotension, avoid NSAIDs, avoid nephrotoxins  If urine output drops off, may need lasix drip  Hyperkalemia- secondary to urinary retention & bactrim  Repeat from 10am is the same at 6.1  Treat medically with insulin/dextrose, bicarbonate amp, lokelma  Hypernatremia- secondary to decreased oral intake / dehydration  Change IVF to 1/2 NS  Hypercalcemia- may be secondary to volume depletion + immobility, rule out other cause   Workup: pending - PTH, vitamin D, vitamin D 1 25, ionized calcium, SPEP, UPEP, FLC ratio  Treatment: give further IVF & volume expansion  Urinary Retention with Moderate Bilateral Hydroureteronephrosis- marie catheter in place with mild to moderate hydronephrosis  Catheter replaced 2/20/24  COVID 19 Infection- positive on 1/7/24  Off precautions  Anemia- per primary team    PLAN:  IVF to 1/2NS  Avoid bactrim  Treat potassium medically  Hypercalcemia workup    HISTORY OF PRESENT ILLNESS:  Requesting Physician: Pearl Eubanks MD  Reason for Consult: ELIESER/electrolyte abnormalities     Carla Hunt is a 59 y.o. year old female who was admitted to [facility] after presenting with generalized weakness, GI bug, COVID, stroke like symptoms in January.  She was taken off COVID precautions.  A renal consultation is requested today for assistance in the management of acute kidney injury and electrolyte abnormalities.  She is currently  intubated and sedated.  Ortiz catheter in place.  and brother at bedside.      PAST MEDICAL HISTORY:  Past Medical History:   Diagnosis Date    Hx of hypercalcemia     Lymphoma (HCC) 2021    Parathyroid disease (HCC)     Seizures (HCC)     Situational depression     24 yr old son  from drug overdose       PAST SURGICAL HISTORY:  Past Surgical History:   Procedure Laterality Date    CRANIOTOMY FOR TEMPORAL LOBECTOMY Left        ALLERGIES:  No Known Allergies    SOCIAL HISTORY:  Social History     Substance and Sexual Activity   Alcohol Use No     Social History     Substance and Sexual Activity   Drug Use Never     Social History     Tobacco Use   Smoking Status Never   Smokeless Tobacco Never       FAMILY HISTORY:  Family History   Problem Relation Age of Onset    Diabetes Mother     Cancer Father        MEDICATIONS:    Current Facility-Administered Medications:     acetaminophen (Ofirmev) injection 1,000 mg, 1,000 mg, Intravenous, Q8H Miguel DE LEÓN MD, Stopped at 24 07    bisacodyl (DULCOLAX) rectal suppository 10 mg, 10 mg, Rectal, Daily PRN, DANYELLE Willis, 10 mg at 24 2113    calcium carbonate (TUMS) chewable tablet 500 mg, 500 mg, Oral, BID PRN, Eugenia Langston DO, 500 mg at 24 1843    chlorhexidine (PERIDEX) 0.12 % oral rinse 15 mL, 15 mL, Mouth/Throat, Q12H Miguel DE LEÓN MD, 15 mL at 24 0803    dexmedeTOMIDine (Precedex) 400 mcg in sodium chloride 0.9% 100 mL, 0.1-1.2 mcg/kg/hr, Intravenous, Titrated, Taiwo Preciado DO, Last Rate: 22 mL/hr at 24 1306, 1.2 mcg/kg/hr at 24 1306    dextrose 50 % IV solution 50 mL, 50 mL, Intravenous, Once, Mary Glez PA-C    famotidine (PEPCID) oral suspension 20 mg, 20 mg, Oral, BID, Dl Pillai MD, 20 mg at 24 0803    heparin (porcine) subcutaneous injection 5,000 Units, 5,000 Units, Subcutaneous, Q8H Miguel DE LEÓN MD, 5,000 Units at 24 0746    HYDROmorphone  (DILAUDID) 50 mg in sodium chloride 0.9% 50mL drip, 1 mg/hr, Intravenous, Continuous, Miguel Whittaker MD, Last Rate: 1 mL/hr at 02/20/24 0706, 1 mg/hr at 02/20/24 0706    HYDROmorphone (DILAUDID) injection 0.5 mg, 0.5 mg, Intravenous, Q1H PRN, Ryanne Lerma MD, 0.5 mg at 02/20/24 0408    insulin regular (HumuLIN R,NovoLIN R) 1 Units/mL in sodium chloride 0.9 % 100 mL infusion, 0.3-21 Units/hr, Intravenous, Titrated, Miguel Whittaker MD, Stopped at 02/20/24 1202    insulin regular (HumuLIN R,NovoLIN R) injection 10 Units, 10 Units, Intravenous, Once, Mary Glez PA-C    lactulose (CHRONULAC) oral solution 20 g, 20 g, Oral, TID, Jad Mena MD, 20 g at 02/20/24 0803    lamoTRIgine (LaMICtal) tablet 150 mg, 150 mg, Oral, BID, Mahamed Cardenas DO, 150 mg at 02/20/24 0803    levofloxacin (LEVAQUIN) IVPB (premix in dextrose) 750 mg 150 mL, 750 mg, Intravenous, Q48H, Monique Macedo MD, Stopped at 02/19/24 1414    LORazepam (ATIVAN) injection 0.5 mg, 0.5 mg, Intravenous, Q6H PRN, Bhakti Kaur DO, 0.5 mg at 02/19/24 1700    methylPREDNISolone sodium succinate (Solu-MEDROL) injection 125 mg, 125 mg, Intravenous, Q12H SARTHAK, Dl Pillai MD, 125 mg at 02/20/24 0803    nystatin (MYCOSTATIN) powder, , Topical, BID, Miguel Whittaker MD, Given at 02/20/24 0803    ondansetron (ZOFRAN) injection 4 mg, 4 mg, Intravenous, Q6H PRN, Eugenia Langston DO, 4 mg at 02/03/24 2109    phenylephrine (MADHAV-SYNEPHRINE) 50 mg (STANDARD CONCENTRATION) in sodium chloride 0.9% 250 mL,  mcg/min, Intravenous, Titrated, Miguel Whittaker MD, Last Rate: 10.5 mL/hr at 02/20/24 0935, 35 mcg/min at 02/20/24 0935    phenylephrine HCl (VAZCULEP) 10 MG/ML solution **ADS Override Pull**, , , ,     QUEtiapine (SEROquel) tablet 12.5 mg, 12.5 mg, Oral, HS, Eugenia Langston DO, 12.5 mg at 02/19/24 2130    senna-docusate sodium (SENOKOT S) 8.6-50 mg per tablet 2 tablet, 2 tablet, Oral, BID, Taiwo Preciado DO, 2 tablet at 02/20/24 0803     "sodium bicarbonate 8.4 % injection 25 mEq, 25 mEq, Intravenous, Once, Mary Glez PA-C    sodium chloride (AYR SALINE NASAL) nasal gel 1 Application, 1 Application, Nasal, Q2H, Jackie Tanner MD, 1 Application at 02/20/24 1207    sodium chloride (OCEAN) 0.65 % nasal spray 1 spray, 1 spray, Each Nare, Q2H, Jackie Tanner MD, 1 spray at 02/20/24 1207    sodium chloride infusion 0.45 %, 125 mL/hr, Intravenous, Continuous, Mary Glez PA-C, Last Rate: 125 mL/hr at 02/20/24 1207, 125 mL/hr at 02/20/24 1207    Sodium Zirconium Cyclosilicate (Lokelma) 10 g, 10 g, Oral, Once, Mary Glez PA-C    topiramate (TOPAMAX) tablet 100 mg, 100 mg, Oral, BID, Eugenia Langston DO, 100 mg at 02/20/24 0806    venlafaxine (EFFEXOR) tablet 25 mg, 25 mg, Per NG Tube, TID With Meals, Miguel Whittaker MD, 25 mg at 02/20/24 1207    REVIEW OF SYSTEMS:  A complete 10 point review of systems was unable to be performed due to the patient being intubated and sedated.     PHYSICAL EXAM:  Current Weight: Weight - Scale: 69.9 kg (154 lb)  First Weight: Weight - Scale: 77.2 kg (170 lb 3.1 oz)  Vitals:    02/20/24 1000 02/20/24 1100 02/20/24 1127 02/20/24 1200   BP:   90/50    Pulse: (!) 124 103 (!) 120 93   Resp: 19 15  (!) 27   Temp:       TempSrc:       SpO2: (!) 89% 91%  91%   Weight:   69.9 kg (154 lb)    Height:   5' 8\" (1.727 m)        Intake/Output Summary (Last 24 hours) at 2/20/2024 1320  Last data filed at 2/20/2024 1200  Gross per 24 hour   Intake 6248.77 ml   Output 2790 ml   Net 3458.77 ml     General: intubated and sedated  Skin: no rash  Eyes: anicteric  ENMT: mm moist  Neck: no masses  Respiratory: coarse  CVS: RRR  Extremities: no edema  GI: tense distended  Neuro: sedated  Psych: sedated     Invasive Devices:   Urethral Catheter Latex 16 Fr. (Active)   Output (mL) 1950 mL 02/20/24 1121     Lab Results:   Results from last 7 days   Lab Units 02/20/24  1010 02/20/24  0449 02/19/24  1132 02/19/24  0452 02/18/24  0537 " 02/17/24  0511 02/16/24  1833   WBC Thousand/uL  --  25.15*  --  29.45* 27.26*   < >  --    HEMOGLOBIN g/dL  --  7.9*  --  8.3* 8.8*   < >  --    HEMATOCRIT %  --  24.5*  --  25.1* 24.9*   < >  --    PLATELETS Thousands/uL  --  363  --  349 355   < >  --    POTASSIUM mmol/L 6.1* 6.1* 5.8* 5.9* 5.6*   < >  --    CHLORIDE mmol/L 113* 110* 110* 106 101   < >  --    CO2 mmol/L 27 28 24 21 21   < >  --    BUN mg/dL 95* 89* 102* 107* 105*   < >  --    CREATININE mg/dL 1.62* 1.50* 1.69* 1.81* 1.92*   < >  --    CALCIUM mg/dL 11.8* 11.6* 10.6* 11.3* 10.9*   < >  --    MAGNESIUM mg/dL  --  3.0*  --  2.9* 3.0*   < >  --    ALK PHOS U/L  --   --   --   --   --   --  72   ALT U/L  --   --   --   --   --   --  24   AST U/L  --   --   --   --   --   --  17    < > = values in this interval not displayed.     I have personally reviewed the blood work as stated above and in my note.  I have personally reviewed palliative care and critical care notes.

## 2024-02-20 NOTE — PROGRESS NOTES
[facility]  Progress Note: Critical Care  Name: Carla Hunt 59 y.o. female I MRN: [id number]  Unit/Bed#: MICU 10 I Date of Admission: 1/10/2024   Date of Service: 2/20/2024 I Hospital Day: 41    Assessment/Plan     Acute hypoxic respiratory failure due to severe covid/covid induced pneumonitis  Stenotrophomonas pneumonia  Steroid induced hyperglycemia  Sinus tachycardia  Acute kidney Injury/ATN  B cell lymphoma  History of small SAH  CKD stage III  Hypertriglyceridemia  Constipation  Sacral ulcer, unstageable  Seizure disorder  Anxiety     Neuro:   Sedation: Off propofol, On precedex drip 1.0 and dilaudid drip at 0.5.  Given 4mg IV versed one time, additional to maintain sedation.   -Aim to maintain RASS 0 to -1, preferably -1 (drowsy) since patient has had difficulty tolerating vent  -Repeat triglyceride level ordered today to see if it would be possible to restart propofol, at 305. Will hold propofol.      Diagnosis: seizure disorder  -S/p partial left temporal lobe resection in 2007  -Contuinue home lamictal 150 bid and topamax 100 bid     Diagnosis: Anxiety  -On venlafaxine 25 mg TID via NGT  -Seroquel 12.5 hs and melatonin 6 hs for sleep     CV:   Diagnosis: Sinus tachycardia  -Likely in the setting of underlying inflammatory process and/or ongoing concern for infectious process  -Would continue with treatment of underlying etiology      Pulm:  Diagnosis: Acute hypoxic respiratory failure due to severe covid  and covid induced fibrosis  -Tested COVID positive on 1/7  -Completed severe COVID pathway and 7 days CTX  -Tenuous respiratory status requiring multiple re-admissions to MICU  -S/p Bronch 2/2 and 02/16  -NG on cultures (initially showed non-group A strep)  -PCP and AFB negative   -Legionella, viral, fungal cultures no growth to date  -Pulse dose steroids with solumedrol 1g daily x3 days (completed 2/7) then transitioned to prednisone 80mg daily on 2/8  -Off  veletri  -Completed IVIG for 5 days  -CRP trend 221 (02/19) > 116.9 (02/20) > 94.2 (02/20)  -Currently on solumedrol 125mg Q12H, would recommend continuing this for now     GI:   Diagnosis: Constipation & Abdominal distension  -Likely exacerbated with dilaudid drip  -Bowel regimen with daily MiraLAX, twice daily senna and lactulose 20 mg TID  -On dulcolax suppository PRN  -CT CAP without contrast ordered to further evaluate this    -On famotidine 20 mg for GI prophylaxis  -Tube feeds running     :   Diagnosis: CKD III  -Baseline Cr appears to be 0.8-1.2  -UA unremarkable   -Upon chart review 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) though unclear how this was diagnosed, this also does not enirely fit as she is NOT hypokalemic  -Continue to monitor I/O and UOP     Diagnosis: Acute kidney injury, sCr trend 11.28> 1.92> 1.81 > 1 > 1.69 > 1.5 today  Etiology: Likely due to prerenal azotemia or ATN  -On IVF with isolyte (infusion)  -Monitor with daily BMP  -Nephrology consult ordered  -Avoid nephrotoxic medications and contrast studies if possible     Diagnosis: Hyperkalemia, at 6.1 today  -BMP in afternoon ordered  -On insulin drip which will help with this  -Given one time bolus of 10 units IV insulin via the drip + dextrose 50 mL  -EKG ordered    Diagnosis: Hypercalcemia, at 11.6  -Likely in the setting of ongoing ELIESER/ATN  -May improve with fluid boluses/infusions     F/E/N:   F: ON IVF with isolyte, boluses as indicated.   E: monitor and replete for goal K>4, P>3, Mg>2  N: Tube feeds      Heme/Onc:   Diagnosis: B cell lymphoma  -Follows with heme[facility]onc at [facility]  -On rituxan for 2 year course, started May 2022  -Last dose was 12/20, treatment currently on hold   -Leukopenic on admission but WBC now wnl     DVT ppx with Heparin subcutaneous (switched from lovenox due to ELIESER)     Endo:   Diagnosis: steroid hyperglycemia and diabetes mellitus type 2, A1c at 6.6 from 01/2024  -On tube feeds,  glucerna  -Goal -180  -BG range last 24hrs 1212-397, Started on insulin drip, this may also help with ongoing hyperkalemia     ID:   Diagnosis: Severe covid pneumonia and stenotrophomonas pneumonia  -Completed severe COVID pathway with decadron (ended 1/22) and remdesivir (ended 1/20), also completed 7 days CTX on 1/15  -C/f opportunistic infections given immunocompromised status on chemotherapy and recent steroid use  -No consolidations on CXR and procal negative  -S/p bactrim and minocycline x5 days for stenotrophomonas on sputum culture (1/25), then on bactrim for PJP ppx  -Bronc on 2/2 - Cx w/o growth (initially resulted as 1+ alpha hemolytic strep), AFB & PCP negative, f/u fungal, legionella, and viral cultures   -2/10 pt again had escalating O2 requirements on floors necessitating readmission to ICU  -Concern that with recent pulse dose steroids and now worsening respiratory status she could have infection, possibly opportunistic   -UA with moderate leukocytes, nitrite negative, 30-50 WBC, trace protein.  -Repeat sputum culture 2/9 with 4+ stenotrophomonas  -Bronch cultures with candida albicans, Rare gram positive cocci in pairs  -On high dose bactrim, received 10 days of this. Was also on vancomycin and cefepime. As per ID recommendations this morning, now off all three and switched to levaquin 750 mg IV ever 48 hours, Plan to continue for 5 days.     MSK/Skin:   -Wound care team following for bilateral sacral wounds    Disposition: Critical care    ICU Core Measures     Vented Patient  VAP Bundle  VAP bundle ordered     A: Assess, Prevent, and Manage Pain Has pain been assessed? Yes  Need for changes to pain regimen? No   B: Both Spontaneous Awakening Trials (SATs) and Spontaneous Breathing Trials (SBTs) Plan to perform spontaneous awakening trial today? Modified and patient remained on precedex   Plan to perform spontaneous breathing trial today? Modified and patient remained on precedex   Obvious  barriers to extubation? Yes   C: Choice of Sedation RASS Goal: -1 Drowsy  Need for changes to sedation or analgesia regimen? Yes   D: Delirium CAM-ICU: Negative   E: Early Mobility  Plan for early mobility? Yes   F: Family Engagement Plan for family engagement today? Yes       Antibiotic Review: Patient on appropriate coverage based on culture data.     Review of Invasive Devices:    Ortiz Plan: Continue for accurate I/O monitoring for 48 hours    Sterling Plan: Keep arterial line for frequent ABGs    Prophylaxis:  VTE VTE covered by:  enoxaparin, Subcutaneous, 40 mg at 02/19/24 0801       Stress Ulcer  covered byfamotidine (PEPCID) oral suspension 20 mg [[id number]]        Significant 24hr Events     24hr events: No events overnight. Was Ttchycardic to 140-150, wasn't tolerating vent well. Tried weaning off precedex and then to ketamine but did not respond to that. Also got 2x 500 cc boluses with no significant response. Subsequently placed on isolyte infusion. Only made 100cc of urine. Concern for pericardial effusion.      Subjective   Patient seen by bedside. Plan on discussing updates with patients family today.     Review of Systems   Unable to perform ROS: Patient nonverbal        Objective                            Vitals I/O      Most Recent Min/Max in 24hrs   Temp 100.4 °F (38 °C) Temp  Min: 99.3 °F (37.4 °C)  Max: 102.6 °F (39.2 °C)   Pulse (!) 137 Pulse  Min: 118  Max: 144   Resp 20 Resp  Min: 17  Max: 33   BP 90/50 No data recorded   O2 Sat 91 % SpO2  Min: 88 %  Max: 95 %   Vent: APVcmv setting, 16RR/400/10Peep/80%  Drips:   Dilaudid drip 0.5  Precedex drip at 1.0   Intake/Output Summary (Last 24 hours) at 2/20/2024 0635  Last data filed at 2/20/2024 0226  Gross per 24 hour   Intake 4826.83 ml   Output 1020 ml   Net 3806.83 ml       Diet Enteral/Parenteral; Tube Feeding No Oral Diet; Glucerna 1.2; Continuous; 60; 50; Every 6 hours    Invasive Monitoring   Arterial Line  Amelia /63  Arterial Line BP   Min: 101/52  Max: 210/210   MAP 82 mmHg  Arterial Line MAP (mmHg)  Min: 67 mmHg  Max: 210 mmHg           Physical Exam   Physical Exam  Vitals reviewed.   Skin:     General: Skin is warm.      Capillary Refill: Capillary refill takes less than 2 seconds.      Comments: Cool to touch in lower extremities, warm to touch in upper extremities   HENT:      Head: Normocephalic.   Cardiovascular:      Rate and Rhythm: Tachycardia present.      Pulses: Normal pulses.   Abdominal: General: There is distension.     Palpations: Abdomen is soft.      Comments: Moderate distension   Constitutional:       Appearance: She is ill-appearing.      Interventions: She is sedated and intubated.      Comments: Has 3x PIV, ETT/NGT, arterial line on left side, marie in place.   Pulmonary:      Effort: Pulmonary effort is normal. She is intubated.      Comments: Mechanically ventilated breath sounds  Neurological:      Mental Status: She is lethargic.      Comments: Appearing lethargic, able to follow simple commands. Opens eyes to verbal stimuli but falls asleep shortly thereafter. Able to move extremities spontaneously.    Genitourinary/Anorectal:  Marie present.          Diagnostic Studies      EKG: Reviewed  Imaging: Reviewed I have personally reviewed pertinent reports.       Medications:  Scheduled PRN   acetaminophen, 1,000 mg, Q8H SARTHAK  chlorhexidine, 15 mL, Q12H SARTHAK  enoxaparin, 40 mg, Daily  famotidine, 20 mg, BID  lactulose, 20 g, TID  lamoTRIgine, 150 mg, BID  levofloxacin, 750 mg, Q48H  methylPREDNISolone sodium succinate, 125 mg, Q12H SARTHAK  midazolam, ,   midazolam, 4 mg, Once  nystatin, , BID  polyethylene glycol, 17 g, Daily  QUEtiapine, 12.5 mg, HS  senna-docusate sodium, 2 tablet, BID  sodium chloride, 1 Application, Q2H  sodium chloride, 1 spray, Q2H  topiramate, 100 mg, BID  venlafaxine, 25 mg, TID With Meals      bisacodyl, 10 mg, Daily PRN  calcium carbonate, 500 mg, BID PRN  HYDROmorphone, 0.5 mg, Q1H PRN  LORazepam,  0.5 mg, Q6H PRN  midazolam, ,   ondansetron, 4 mg, Q6H PRN       Continuous    dexmedetomidine, 0.1-1.2 mcg/kg/hr, Last Rate: 0.5 mcg/kg/hr (02/20/24 0416)  HYDROmorphone, 0.5 mg/hr, Last Rate: 0.5 mg/hr (02/18/24 2000)  insulin regular (HumuLIN R,NovoLIN R) 1 Units/mL in sodium chloride 0.9 % 100 mL infusion, 0.3-21 Units/hr, Last Rate: 2 Units/hr (02/20/24 0414)  ketamine, 0.5 mg/kg/hr, Last Rate: Stopped (02/20/24 0152)  multi-electrolyte, 100 mL/hr, Last Rate: 100 mL/hr (02/19/24 2048)  norepinephrine, 1-30 mcg/min, Last Rate: Stopped (02/18/24 0911)         Labs:    CBC    Recent Labs     02/19/24 0452 02/20/24 0449   WBC 29.45* 25.15*   HGB 8.3* 7.9*   HCT 25.1* 24.5*    363     BMP    Recent Labs     02/19/24  1132 02/20/24 0449   SODIUM 142 148*   K 5.8* 6.1*   * 110*   CO2 24 28   AGAP 8 10   * 89*   CREATININE 1.69* 1.50*   CALCIUM 10.6* 11.6*       Coags    No recent results     Additional Electrolytes  Recent Labs     02/19/24 0452 02/20/24 0449   MG 2.9* 3.0*          Blood Gas    Recent Labs     02/20/24 0455   PHART 7.314*   [id number] 55.4*   PO2ART 70.9*   ENA7NPB 27.5   BEART 0.8   SOURCE Line, Arterial     Recent Labs     02/19/24  1023 02/20/24  0000 02/20/24 0455   PHVEN 7.404*  --   --    ITU0UXF 35.8*  --   --    PO2VEN 107.6*  --   --    [id number] 21.9*  --   --    BEVEN -2.5  --   --    [id number] 96.1*  --   --    SOURCE  --    < > Line, Arterial    < > = values in this interval not displayed.    LFTs  No recent results    Infectious  No recent results  Glucose  Recent Labs     02/19/24  0452 02/19/24  1132 02/20/24  0449   GLUC 397* 185* 185*             Miguel Whittaker MD

## 2024-02-20 NOTE — CONSULTS
Acute Care Surgery  Consultation    1. 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  Acute hypoxic respiratory failure  3.  Mild ELIESER  4.  Hyperkalemia  5.  Sinus tachycardia  6.  B-cell lymphoma  7.  Steroid-induced hyperglycemia    Disposition: OR urgently for diagnostic laparoscopy versus exploratory laparotomy with possible colon resection and ostomy creation.  Will need close observation in ICU for postoperative setting.    History of Present Illness   HPI:  Carla Hunt is a 59 y.o. female who presents with 2-day history of worsening abdominal distention and concern over abdominal exam in the setting of most recent CT scan that was ascertained this morning by the medical team.  Patient currently on the medical critical care service secondary to COVID-pneumonia.  Patient currently intubated and sedated.  She has acute hypoxic respiratory failure.  General surgery team was consulted with most recent CAT scan showing possible cecal volvulus and small bowel obstruction.  Family is at bedside to assist with history.  Patient has no significant surgical history.  Family consented to risk-benefit and alternatives regarding surgery.  Questions answered.    Review of Systems   Unable to perform ROS: Intubated       Historical Information   Past Medical History:   Diagnosis Date    Hx of hypercalcemia     Lymphoma (HCC) 06/2021    Parathyroid disease (HCC)     Seizures  (HCC)     Situational depression     24 yr old son  from drug overdose     Past Surgical History:   Procedure Laterality Date    CRANIOTOMY FOR TEMPORAL LOBECTOMY Left      Social History   Social History     Substance and Sexual Activity   Alcohol Use No     Social History     Substance and Sexual Activity   Drug Use Never     Social History     Tobacco Use   Smoking Status Never   Smokeless Tobacco Never     Family History   Problem Relation Age of Onset    Diabetes Mother     Cancer Father        Meds/Allergies   PTA meds:   Prior to Admission Medications   Prescriptions Last Dose Informant Patient Reported? Taking?   busPIRone (BUSPAR) 5 mg tablet   Yes No   Sig: Take 5 mg by mouth 2 (two) times a day   escitalopram (LEXAPRO) 10 mg tablet   Yes No   ibuprofen (MOTRIN) 600 mg tablet   No No   Sig: Take 1 tablet (600 mg total) by mouth every 6 (six) hours as needed for mild pain for up to 10 days   Patient not taking: Reported on 2023   lamoTRIgine (LaMICtal) 200 MG tablet   No No   Sig: Take 1 tablet (200 mg total) by mouth 2 (two) times a day   lamoTRIgine (LaMICtal) 200 MG tablet   No No   Si tablet by mouth twice per day.   medroxyPROGESTERone acetate (DEPO-PROVERA SYRINGE) 150 mg/mL injection  Self Yes No   Sig: Inject 150 mg into a muscle every 3 (three) months    ondansetron (ZOFRAN-ODT) 4 mg disintegrating tablet  Self No No   Sig: Take 1 tablet (4 mg total) by mouth every 8 (eight) hours as needed for nausea or vomiting   Patient not taking: Reported on 2020   topiramate (TOPAMAX) 100 mg tablet   No No   Sig: Take 1 tablet (100 mg total) by mouth 2 (two) times a day   topiramate (TOPAMAX) 100 mg tablet   No No   Sig: Take 1 tablet (100 mg total) by mouth 2 (two) times a day   venlafaxine (EFFEXOR-XR) 75 mg 24 hr capsule   Yes No   Sig: Take 75 mg by mouth daily      Facility-Administered Medications: None     No Known Allergies    Objective   First Vitals:   Blood Pressure: 119/84  "(01/10/24 2000)  Pulse: 82 (01/10/24 2000)  Temperature: 97.9 °F (36.6 °C) (01/10/24 2000)  Temp Source: Rectal (01/10/24 2003)  Respirations: (!) 26 (01/10/24 2000)  Height: 5' 8\" (172.7 cm) (01/10/24 2003)  Weight - Scale: 77.2 kg (170 lb 3.1 oz) (01/10/24 2003)  SpO2: 94 % (01/10/24 2000)    Current Vitals:   Blood Pressure: 90/50 (02/20/24 1127)  Pulse: 93 (02/20/24 1200)  Temperature: (!) 100.6 °F (38.1 °C) (02/20/24 0800)  Temp Source: Rectal (02/20/24 0800)  Respirations: (!) 27 (02/20/24 1200)  Height: 5' 8\" (172.7 cm) (02/20/24 1127)  Weight - Scale: 69.9 kg (154 lb) (02/20/24 1127)  SpO2: 91 % (02/20/24 1200)      Intake/Output Summary (Last 24 hours) at 2/20/2024 1233  Last data filed at 2/20/2024 1200  Gross per 24 hour   Intake 6248.77 ml   Output 2790 ml   Net 3458.77 ml       Invasive Devices       Peripheral Intravenous Line  Duration             Peripheral IV 02/18/24 Right;Ventral (anterior) Forearm 1 day    Peripheral IV 02/19/24 Dorsal (posterior);Left Forearm 1 day              Arterial Line  Duration             Arterial Line 02/14/24 Radial 6 days              Drain  Duration             NG/OG/Enteral Tube Orogastric Right mouth 6 days    Urethral Catheter Latex 16 Fr. <1 day              Airway  Duration             ETT  Cuffed 7.5 mm 6 days                    Physical Exam  Constitutional:       Appearance: She is ill-appearing.   HENT:      Head: Normocephalic.      Right Ear: External ear normal.      Left Ear: External ear normal.   Cardiovascular:      Rate and Rhythm: Normal rate and regular rhythm.      Pulses: Normal pulses.   Pulmonary:      Comments: Intubated on ventilator   Abdominal:      General: There is distension.      Tenderness: There is abdominal tenderness. There is no guarding or rebound.   Musculoskeletal:         General: No swelling.   Skin:     General: Skin is warm and dry.         Lab Results: I have personally reviewed pertinent lab results.  , CBC:   Lab Results "   Component Value Date    WBC 25.15 (H) 02/20/2024    HGB 7.9 (L) 02/20/2024    HCT 24.5 (L) 02/20/2024    MCV 95 02/20/2024     02/20/2024    RBC 2.57 (L) 02/20/2024    MCH 30.7 02/20/2024    MCHC 32.2 02/20/2024    RDW 21.2 (H) 02/20/2024    MPV 10.9 02/20/2024    NRBC 3 02/20/2024   , CMP:   Lab Results   Component Value Date    SODIUM 148 (H) 02/20/2024    K 6.1 (H) 02/20/2024     (H) 02/20/2024    CO2 28 02/20/2024    BUN 89 (H) 02/20/2024    CREATININE 1.50 (H) 02/20/2024    CALCIUM 11.6 (H) 02/20/2024    EGFR 38 02/20/2024     Imaging: I have personally reviewed pertinent reports.    EKG, Pathology, and Other Studies: I have personally reviewed pertinent reports.      Counseling / Coordination of Care  Total floor / unit time spent today 43 minutes.  Greater than 50% of total time was spent with the patient and / or family counseling and / or coordination of care.    Christian Ramirez PA-C  2/20/2024 12:33 PM

## 2024-02-20 NOTE — SOCIAL WORK
"KRISTIN SW met the pt family at bedside.  The pt had a Cat Scan earlier in the morning.  The family discussed concerns.  The family states she makes progress, but whenever one issue is addressed a new issue arises. The pt  states he would like the pt to be well enough to come off the vent.  The family remains prayerful and hopeful she will recover.  They describe the weekend as being terrible.  They felt she was better yesterday but did not do well overnight.    The pt son birthday is tomorrow.  Her  states he and their daughter will help him celebrate, but he states their son is very close to the patient.    The pt  was encouraged to take care of himself.  He replied, \"She come first.\"  He states that he is trying to make sure he takes care of himself as well.      I have spent 30 minutes with Patient and family today in which greater than 50% of this time was spent in counseling/coordination of care     Palliative  will follow-up as requested by patient, family, and primary team.  Please contact with any specific requests    "

## 2024-02-20 NOTE — RESPIRATORY THERAPY NOTE
RT Ventilator Management Note  Carla Hunt 59 y.o. female MRN: [id number]  [id number]      Daily Screen         2/18/2024  0740 2/19/2024  0733          Patient safety screen outcome:: Passed Failed      Not Ready for Weaning due to:: -- Underline problem not resolved                Physical Exam:   Assessment Type: (P) Assess only  General Appearance: Sedated, Sleeping  Respiratory Pattern: Assisted  Chest Assessment: Chest expansion symmetrical  Bilateral Breath Sounds: Coarse  Cough: Productive  Suction: ET Tube  O2 Device: Ventilator      Resp Comments: (P) Pt. alarming volume limitation on APV frequently. FiO2 titrated to 70% secondary to low SpO2. Will continue to monitor.

## 2024-02-20 NOTE — ANESTHESIA PROCEDURE NOTES
Central Line Insertion    Performed by: Lauren Castellon MD  Authorized by: Lauren Castellon MD    Date/Time: 2/20/2024 2:17 PM  Catheter Type:  triple lumen  Consent: Verbal consent obtained. Written consent obtained.  Consent given by: spouse  Patient understanding: patient states understanding of the procedure being performed  Patient consent: the patient's understanding of the procedure matches consent given  Procedure consent: procedure consent matches procedure scheduled  Relevant documents: relevant documents present and verified  Test results: test results available and properly labeled  Patient identity confirmed: arm band  Indications: vascular access  Catheter size: 7 Fr  Patient position: flat    Sedation:  Patient sedated: yes (general anesthesia)    Assessment: blood return through all ports and free fluid flow  Preparation: skin prepped with ChloraPrep  Skin prep agent dried: skin prep agent completely dried prior to procedure  Sterile barriers: all five maximum sterile barriers used - cap, mask, sterile gown, sterile gloves, and large sterile sheet  Hand hygiene: hand hygiene performed prior to central venous catheter insertion  sterile gel and probe cover used in ultrasound-guided central venous catheter insertionultrasound permanent image saved  Reason All Sterile Barriers Not Used:  All elements of maximal sterile barrier technique not followed for medical reasons  Number of attempts: 1  Successful placement: yes  Post-procedure: dressing applied and line sutured  Patient tolerance: patient tolerated the procedure well with no immediate complications

## 2024-02-20 NOTE — ASSESSMENT & PLAN NOTE
Per previous discussion with palliative care provider, family aware of guarded prognosis. Spouse does have a clear limit that the patient has already placed which is no tracheostomy or long-term ventilator support. Immediate goal of proceeding to the OR given new finding of cecal volvulus.   Plan for ongoing conversation as clinical situation evolves.

## 2024-02-20 NOTE — ANESTHESIA POSTPROCEDURE EVALUATION
Post-Op Assessment Note    CV Status:  Stable    Pain management: adequate       Mental Status:  Sleepy (pt sedated)   Hydration Status:  Stable   PONV Controlled:  Controlled   Airway Patency:  Patent  Airway: intubated     Post Op Vitals Reviewed: Yes    No anethesia notable event occurred.    Staff: Anesthesiologist, CRNA               BP   111/58   Temp      Pulse  96   Resp   14   SpO2   96

## 2024-02-20 NOTE — PROGRESS NOTES
Progress Note - Infectious Disease   Carla Hunt 59 y.o. female MRN: [id number]  Unit/Bed#: OR POOL Encounter: [id number]      Impression/Recommendations:  Recurrent acute hypoxic respiratory failure.  Initially, on admission early January, secondary to mostly COVID but there was concern for concurrent bacterial pneumonia on admission due to elevated procalcitonin. Patient completed treatment course for both COVID and bacterial pneumonia.  She was clinically improved with decreasing O2 requirement.  However, patient deteriorated in late January, requiring to be placed back on high flow O2 support.  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  Procalcitonin x 3 were in the normal range.  Patient has no fever or leukocytosis.  She improved with increasing steroid dose.  She completed a 5-day course of Bactrim/minocycline for presumptive stenotrophomonas respiratory infection, with expectorated sputum growing stenotrophomonas.  She subsequently had bronchoscopy, with BAL culture negative for bacterial growth, AFB and PCP but completed the course of Bactrim/minocycline prior to bronchoscopy.  Patient improved and O2 was being weaned again until 2 weeks ago, when she deteriorated again.  She is now back in ICU.  Expectorated sputum once again is growing stenotrophomonas.  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  Not sure that the stenotrophomonas that is growing again and expectorated sputum is pathogenic versus upper airway colonization.  Patient was restarted on high-dose steroid and Bactrim.  Patient's respiratory status remained tenuous throughout, required intubation subsequently.  She had bronchoscopy on 2/16 with moderate secretion.  BAL culture had no growth but COVID PCR was positive.  Bactrim was changed to Levaquin over weekend due to ELIESER.  Continue Levaquin.  Treat x 14 days total antibiotic this time around.  Continue  high-dose systemic corticosteroid, vetetri and IVIG per critical service  Monitor temperature/WBC.  Plan for repeat bronchoscopy noted.     Severe COVID, present on admission.  Patient was treated with remdesivir, dexamethasone and was given 1 dose of Tocilizumab on admission.  She also had a course of antibiotic initially for presumptive bacterial superinfection.  COVID antigen was negative prior to coming off isolation.  Current chest CT is showing extensive fibrotic changes.  Persistently positive COVID PCR in BAL noted.  This may be all prolonged shedding.  However, given immunosuppressed state and lack of response to antibiotic and high-dose steroid thus far, will treat patient with another course of remdesivir.  Restart remdesivir x 5-10-day course.     CKD.  Creatinine worsened over weekend, likely secondary to Bactrim.  Patient is now off Bactrim.  Creatinine is stable.  Monitor creatinine.     Cecal volvulus, noted on abdomen/pelvis CT earlier today.  Plan for surgical repair later today noted.  Repair of cecal volvulus per surgery.     Seizure disorder, status post temporal lobe resection in 2007.     B-cell lymphoma, on chemotherapy, which is currently postponed.  Patient was leukopenic on admission but WBC now normal.  Monitor WBC/ANC.     Discussed with patient's  in detail regarding all above and overall poor prognosis.  Discussed with Dr. Cardenas and Remedios from critical care medicine service regarding event patient had another course of remdesivir for possible persistent/recurrent COVID infection.  They are agreeable.       Antibiotics:  Levaquin # 2  Antibiotic # 11     Subjective:  Patient remains intubated.  She is sedated and minimally responsive  Temperature low-grade.  No chills.  She is tolerating antibiotic well.  No nausea, vomiting or diarrhea.         Objective:  Vitals:  Temp:  [99.3 °F (37.4 °C)-100.6 °F (38.1 °C)] 100.6 °F (38.1 °C)  HR:  [] 83  Resp:  [15-33] 27  BP: (90)/(50)  90/50  SpO2:  [88 %-95 %] 92 %  Temp (24hrs), Av.1 °F (37.8 °C), Min:99.3 °F (37.4 °C), Max:100.6 °F (38.1 °C)  Current: Temperature: (!) 100.6 °F (38.1 °C)    Physical Exam:     General: Sedated on ventilator.  No response to verbal stimuli, comfortable, nontoxic.   Neck:  Supple. No mass.  No lymphadenopathy.   Lungs: Expansion symmetric, diffuse rhonchi, no rales, no wheezing.   Heart:  Regular rate and rhythm, S1 and S2 normal, no murmur.   Abdomen: Soft, mild distention, no masses, difficult to assess tenderness.   Extremities: Stable mild leg edema. No erythema/warmth. No ulcer. Nontender to palpation.   Skin:  No rash.   Neuro: Not assessable.     Invasive Devices       Peripheral Intravenous Line  Duration             Peripheral IV 24 Right;Ventral (anterior) Forearm 1 day    Peripheral IV 24 Dorsal (posterior);Left Forearm 1 day              Arterial Line  Duration             Arterial Line 24 Radial 6 days              Drain  Duration             NG/OG/Enteral Tube Orogastric Right mouth 6 days    Urethral Catheter Latex 16 Fr. <1 day              Airway  Duration             ETT  Cuffed 7.5 mm 6 days                    Labs studies:   I have personally reviewed pertinent labs.  Results from last 7 days   Lab Units 24  1010 24  0449 24  1132 24  0511 24  1833   POTASSIUM mmol/L 6.1* 6.1* 5.8*   < >  --    CHLORIDE mmol/L 113* 110* 110*   < >  --    CO2 mmol/L 27 28 24   < >  --    BUN mg/dL 95* 89* 102*   < >  --    CREATININE mg/dL 1.62* 1.50* 1.69*   < >  --    EGFR ml/min/1.73sq m 34 38 33   < >  --    CALCIUM mg/dL 11.8* 11.6* 10.6*   < >  --    AST U/L 20  --   --   --  17   ALT U/L 18  --   --   --  24   ALK PHOS U/L 68  --   --   --  72    < > = values in this interval not displayed.     Results from last 7 days   Lab Units 24  0449 24  0452 24  0537   WBC Thousand/uL 25.15* 29.45* 27.26*   HEMOGLOBIN g/dL 7.9* 8.3* 8.8*    PLATELETS Thousands/uL 363 349 355     Results from last 7 days   Lab Units 02/18/24  0926 02/17/24  1818 02/16/24  1214 02/16/24  1209 02/14/24  1340   BLOOD CULTURE   --  No Growth at 48 hrs.  No Growth at 48 hrs.  --   --   --    SPUTUM CULTURE   --   --   --   --  1+ Growth of   GRAM STAIN RESULT   --   --  No polys seen*  Rare Gram positive cocci in pairs* No Polys or Bacteria seen 2+ Polys  1+ Epithelial Cells  No bacteria seen   MRSA CULTURE ONLY  No Methicillin Resistant Staphlyococcus aureus (MRSA) isolated  --   --   --   --        Imaging Studies:   I have personally reviewed pertinent imaging study reports and images in PACS.  1/20 chest/abdomen/pelvis CT reviewed personally.  Worsening bilateral airspace opacities.  Cecal volvulus with SBO.    EKG, Pathology, and Other Studies:   I have personally reviewed pertinent reports.

## 2024-02-20 NOTE — UTILIZATION REVIEW
"Continued Stay Review    Date: 02/20                          Current Patient Class: IP  Current Level of Care: Level 2 stepdown/HOT    HPI:59 y.o. female initially admitted on 01/20     Assessment/Plan: No events overnight. Pt was Tachycardic to 140-150, wasn't tolerating vent well. Tried weaning off precedex to ketamine but no improvement. Received 2x 500 cc boluses with no significant response. Subsequently placed on isolyte infusion. Only made 100cc of urine. Concern for pericardial effusion.   Cont MV, sedation. Cont IVFs. Cont Solu Medrol. Consider bronch, obtain cxs. Old TF, connect OGT to continuous low wall suction. Consulted surgery, planning to take to the OR today. Trend electrolytes and replete as needed. Trend BMP. check bedside echocardiogram tomorrow. Continue to trend CBC and hemoglobin. Cont IV abx. F/u xs. Cont Insulin gtt, accuchecks.        Vital Signs: BP 90/50   Pulse 83   Temp (!) 100.6 °F (38.1 °C) (Rectal)   Resp (!) 27   Ht 5' 8\" (1.727 m)   Wt 69.9 kg (154 lb)   SpO2 92%   BMI 23.42 kg/m²       Pertinent Labs/Diagnostic Results:   02/20   CT chest abdomen pelvis:   1.  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  2.  Moderate bilateral hydroureteronephrosis presumably due to significantly distended urinary bladder.  3.  Worsening widespread airspace opacities in keeping with pneumonitis/pneumonia.     ECHO:    Left Ventricle: Left ventricular cavity size is normal. Wall thickness is at the upper limits of normal. The left ventricular ejection fraction is 70%. Systolic function is hyperdynamic. Although no diagnostic regional wall motion abnormality was identified, this possibility cannot be completely excluded on the basis of this study. Diastolic function is mildly abnormal, consistent with grade I (abnormal) relaxation.    Right Ventricle: Right ventricular cavity size is normal. Systolic function is normal.    Aortic Valve: There is " mild regurgitation.  Results from last 7 days   Lab Units 02/16/24  1209   SARS-COV-2  Detected*     Results from last 7 days   Lab Units 02/20/24  0449 02/19/24  0452 02/18/24  0537 02/17/24  0511 02/16/24  0432   WBC Thousand/uL 25.15* 29.45* 27.26* 22.93* 20.62*   HEMOGLOBIN g/dL 7.9* 8.3* 8.8* 9.5* 10.2*   HEMATOCRIT % 24.5* 25.1* 24.9* 28.3* 28.7*   PLATELETS Thousands/uL 363 349 355 268 236   BANDS PCT %  --   --   --  1  --          Results from last 7 days   Lab Units 02/20/24  1010 02/20/24  0449 02/19/24  1132 02/19/24 0452 02/18/24  0537 02/17/24  0511 02/16/24  0432   SODIUM mmol/L 148* 148* 142 139 133* 137 128*   POTASSIUM mmol/L 6.1* 6.1* 5.8* 5.9* 5.6* 5.1 4.8   CHLORIDE mmol/L 113* 110* 110* 106 101 106 101   CO2 mmol/L 27 28 24 21 21 20* 18*   ANION GAP mmol/L 8 10 8 12 11 11 9   BUN mg/dL 95* 89* 102* 107* 105* 71* 42*   CREATININE mg/dL 1.62* 1.50* 1.69* 1.81* 1.92* 1.28 1.22   EGFR ml/min/1.73sq m 34 38 33 30 28 46 48   CALCIUM mg/dL 11.8* 11.6* 10.6* 11.3* 10.9* 10.7* 9.7   MAGNESIUM mg/dL  --  3.0*  --  2.9* 3.0* 2.7 2.2     Results from last 7 days   Lab Units 02/20/24  1010 02/16/24  1833   AST U/L 20 17   ALT U/L 18 24   ALK PHOS U/L 68 72   TOTAL PROTEIN g/dL 6.9 8.3   ALBUMIN g/dL 3.0* 3.1*   TOTAL BILIRUBIN mg/dL 0.51 0.68   BILIRUBIN DIRECT mg/dL 0.16 0.20     Results from last 7 days   Lab Units 02/20/24  1154 02/20/24  1000 02/20/24  0801 02/20/24  0636 02/20/24  0413 02/20/24  0152 02/19/24  2359 02/19/24  2149 02/19/24  2046 02/19/24  1804 02/19/24  1629 02/19/24  1410   POC GLUCOSE mg/dl 90 155* 362* 188* 192* 149* 106 130 154* 233* 157* 159*     Results from last 7 days   Lab Units 02/20/24  1010 02/20/24  0449 02/19/24  1132 02/19/24  0452 02/18/24  0537 02/17/24  0511 02/16/24  0432 02/15/24  0448 02/14/24  0552   GLUCOSE RANDOM mg/dL 118 185* 185* 397* 275* 247* 142* 113 119     Results from last 7 days   Lab Units 02/20/24  1212 02/20/24  0758 02/20/24  0449 02/19/24  2359  "02/19/24  2152 02/19/24  1630 02/19/24  1132 02/19/24  0758 02/19/24  0752 02/19/24  0452   OSMOLALITY, SERUM mmol/* 366* 362* 350* 355* 355* 357* 366* 365* 365*         No results found for: \"BETA-HYDROXYBUTYRATE\"   Results from last 7 days   Lab Units 02/20/24  0455 02/20/24  0000 02/16/24  0756   PH ART  7.314* 7.399 7.351   PCO2 ART mm Hg 55.4* 39.5 37.0   PO2 ART mm Hg 70.9* 65.9* 119.3   HCO3 ART mmol/L 27.5 23.9 20.0*   BASE EXC ART mmol/L 0.8 -0.8 -5.0   O2 CONTENT ART mL/dL 11.7* 10.2* 17.7   O2 HGB, ARTERIAL % 90.9* 90.1* 96.7   ABG SOURCE  Line, Arterial Line, Arterial Line, Arterial     Results from last 7 days   Lab Units 02/19/24  1023   PH NICA  7.404*   PCO2 NICA mm Hg 35.8*   PO2 NICA mm Hg 107.6*   HCO3 NICA mmol/L 21.9*   BASE EXC NICA mmol/L -2.5   O2 CONTENT NICA ml/dL 11.3   O2 HGB, VENOUS % 96.1*         Results from last 7 days   Lab Units 02/18/24  0537 02/17/24  0908   PROCALCITONIN ng/ml 0.90* 0.55*     Results from last 7 days   Lab Units 02/20/24  0758   LACTIC ACID mmol/L 2.9*         Results from last 7 days   Lab Units 02/20/24  1616   UNIT PRODUCT CODE  [id number]  [id number]   UNIT NUMBER  [id number]  [id number]   UNITABO  A  A   UNITRH  NEG  NEG   CROSSMATCH  Compatible  Compatible   UNIT DISPENSE STATUS  Issued  Issued   UNIT PRODUCT VOL ml 350  350         Results from last 7 days   Lab Units 02/16/24  0432   LIPASE u/L 47     Results from last 7 days   Lab Units 02/20/24  0449 02/19/24  0452 02/18/24  0537 02/17/24  0511 02/16/24  0432   CRP mg/L 94.2* 116.9* 221.0* 288.4* 35.9*         Results from last 7 days   Lab Units 02/20/24  1212 02/20/24  0758 02/20/24  0449 02/19/24  2359 02/19/24  2152 02/19/24  1630 02/19/24  1132 02/15/24  1544 02/15/24  0752   OSMOLALITY, SERUM mmol/* 366* 362* 350* 355* 355* 357*   < > 301*   OSMO UR mmol/KG  --   --   --   --   --   --   --   --  475    < > = values in this interval not displayed.     Results from last 7 days "   Lab Units 02/20/24  0758 02/19/24  1356 02/18/24  1442 02/15/24  0752   CLARITY UA  Clear  --  Turbid  --    COLOR UA  Light Yellow  --  Light Yellow  --    SPEC GRAV UA  1.017  --  1.015  --    PH UA  6.0  --  5.5  --    GLUCOSE UA mg/dl Negative  --  Negative  --    KETONES UA mg/dl Negative  --  Negative  --    BLOOD UA  Moderate*  --  Large*  --    PROTEIN UA mg/dl Negative  --  Trace*  --    NITRITE UA  Negative  --  Negative  --    BILIRUBIN UA  Negative  --  Negative  --    UROBILINOGEN UA (BE) mg/dl <2.0  --  <2.0  --    LEUKOCYTES UA  Negative  --  Moderate*  --    WBC UA /hpf 2-4*  --  30-50*  --    RBC UA /hpf Innumerable*  --  30-50*  --    BACTERIA UA /hpf None Seen  --  Occasional  --    EPITHELIAL CELLS WET PREP /hpf None Seen  --  Occasional  --    MUCUS THREADS   --   --  Occasional*  --    SODIUM UR   --   --   --  57   CREATININE UR mg/dL  --  26.1  --   --              Results from last 7 days   Lab Units 02/17/24  1818 02/16/24  1214 02/16/24  1209 02/14/24  1340   BLOOD CULTURE  No Growth at 48 hrs.  No Growth at 48 hrs.  --   --   --    SPUTUM CULTURE   --   --   --  1+ Growth of   GRAM STAIN RESULT   --  No polys seen*  Rare Gram positive cocci in pairs* No Polys or Bacteria seen 2+ Polys  1+ Epithelial Cells  No bacteria seen     Results from last 7 days   Lab Units 02/16/24  1209   TOTAL COUNTED  100         Results from last 7 days   Lab Units 02/19/24  0452   VANCOMYCIN RM ug/mL 42.8*       Medications:   Scheduled Medications:  [Transfer Hold] acetaminophen, 1,000 mg, Intravenous, Q8H SARTHAK  [Transfer Hold] chlorhexidine, 15 mL, Mouth/Throat, Q12H SARTHAK  [Transfer Hold] dextrose, 50 mL, Intravenous, Once  [Transfer Hold] famotidine, 20 mg, Oral, BID  [Transfer Hold] heparin (porcine), 5,000 Units, Subcutaneous, Q8H SARTHAK  [Transfer Hold] insulin regular, 10 Units, Intravenous, Once  [Transfer Hold] lactulose, 20 g, Oral, TID  [Transfer Hold] lamoTRIgine, 150 mg, Oral, BID  [Transfer  Hold] levofloxacin, 750 mg, Intravenous, Q48H  [Transfer Hold] methylPREDNISolone sodium succinate, 125 mg, Intravenous, Q12H SARTHAK  [Transfer Hold] QUEtiapine, 12.5 mg, Oral, HS  [Transfer Hold] senna-docusate sodium, 2 tablet, Oral, BID  [Transfer Hold] sodium bicarbonate, 25 mEq, Intravenous, Once  [Transfer Hold] sodium chloride, 1 Application, Nasal, Q2H  [Transfer Hold] sodium chloride, 1 spray, Each Nare, Q2H  [Transfer Hold] topiramate, 100 mg, Oral, BID  [Transfer Hold] venlafaxine, 25 mg, Per NG Tube, TID With Meals      Continuous IV Infusions:  dexmedetomidine, 0.1-1.2 mcg/kg/hr, Intravenous, Titrated  HYDROmorphone, 1 mg/hr, Intravenous, Continuous  insulin regular (HumuLIN R,NovoLIN R) 1 Units/mL in sodium chloride 0.9 % 100 mL infusion, 0.3-21 Units/hr, Intravenous, Titrated  phenylephine,  mcg/min, Intravenous, Titrated  sodium chloride, 125 mL/hr, Intravenous, Continuous      PRN Meds:  [Transfer Hold] bisacodyl, 10 mg, Rectal, Daily PRN  bupivacaine (PF) (MARCAINE) 0.5 % 1 mL, lidocaine (PF) (XYLOCAINE-MPF) 1 % 1 mL infiltration, , , PRN  [Transfer Hold] calcium carbonate, 500 mg, Oral, BID PRN  [Transfer Hold] HYDROmorphone, 0.5 mg, Intravenous, Q1H PRN 02/20 x 2  [Transfer Hold] LORazepam, 0.5 mg, Intravenous, Q6H PRN  [Transfer Hold] ondansetron, 4 mg, Intravenous, Q6H PRN  phenylephrine HCl, , ,   sodium chloride, , , PRN        Discharge Plan: [facility]    Network Utilization Review Department  ATTENTION: Please call with any questions or concerns to [phone] and carefully listen to the prompts so that you are directed to the right person. All voicemails are confidential.   For Discharge needs, contact Care Management DC Support Team at [phone] opt. [phone]  Send all requests for admission clinical reviews, approved or denied determinations and any other requests to dedicated fax number below belonging to the campus where the patient is receiving treatment. List of dedicated fax numbers for  the Facilities:  FACILITY NAME UR FAX NUMBER   ADMISSION DENIALS (Administrative/Medical Necessity) [phone]   DISCHARGE SUPPORT TEAM (NETWORK) [phone]   PARENT CHILD HEALTH (Maternity/NICU/Pediatrics) [phone]   [facility] [phone]   [facility] [phone]   [facility] [phone]   [facility] [phone]   [facility] [phone]   [facility] [phone]   [facility] [phone]   [facility] [phone]   [facility] [phone]   [facility] [phone]   [facility] [phone]   [facility] [phone]

## 2024-02-20 NOTE — RESPIRATORY THERAPY NOTE
RT Ventilator Management Note  Carla Hunt 59 y.o. female MRN: [id number]  Unit/Bed#: [facility] 10 Encounter: [id number]      Daily Screen         2/19/2024  0733 2/20/2024  0745          Patient safety screen outcome:: Failed Failed (P)       Not Ready for Weaning due to:: Underline problem not resolved Underline problem not resolved;FiO2 >60%;PEEP > 8cmH2O (P)                 Physical Exam:   Assessment Type: (P) Assess only  General Appearance: (P) Sedated  Respiratory Pattern: (P) Assisted  Chest Assessment: (P) Chest expansion symmetrical  Bilateral Breath Sounds: (P) Diminished, Coarse  Cough: Productive  Suction: ET Tube  O2 Device: Ventilator      Resp Comments: (P) Pt transitioned to PC settings at this time. Pt trialed on different modes of ventilation due to dyssynchrony. Pt appears comfortable on PC settings, SpO2 WNL. Will cont to monitor pe resp protocol.

## 2024-02-21 ENCOUNTER — APPOINTMENT (INPATIENT)
Dept: GASTROENTEROLOGY | Facility: HOSPITAL | Age: 59
DRG: 003 | End: 2024-02-21
Payer: COMMERCIAL

## 2024-02-21 PROBLEM — K52.9 GASTROENTERITIS: Status: RESOLVED | Noted: 2018-11-05 | Resolved: 2024-02-21

## 2024-02-21 PROBLEM — E86.0 LUETSCHER'S SYNDROME: Status: RESOLVED | Noted: 2018-11-05 | Resolved: 2024-02-21

## 2024-02-21 PROBLEM — J18.9 PNEUMONIA OF BOTH LUNGS DUE TO INFECTIOUS ORGANISM: Status: RESOLVED | Noted: 2024-01-25 | Resolved: 2024-02-21

## 2024-02-21 PROBLEM — Z01.419 ENCOUNTER FOR GYNECOLOGICAL EXAMINATION (GENERAL) (ROUTINE) WITHOUT ABNORMAL FINDINGS: Status: RESOLVED | Noted: 2018-10-23 | Resolved: 2024-02-21

## 2024-02-21 PROBLEM — H61.20 IMPACTED CERUMEN: Status: RESOLVED | Noted: 2018-11-05 | Resolved: 2024-02-21

## 2024-02-21 PROBLEM — A41.9 SEPSIS (HCC): Status: RESOLVED | Noted: 2024-01-09 | Resolved: 2024-02-21

## 2024-02-21 LAB
ABO GROUP BLD BPU: NORMAL
ABO GROUP BLD BPU: NORMAL
ANION GAP SERPL CALCULATED.3IONS-SCNC: 4 MMOL/L
ANION GAP SERPL CALCULATED.3IONS-SCNC: 5 MMOL/L
ANISOCYTOSIS BLD QL SMEAR: PRESENT
ANISOCYTOSIS BLD QL SMEAR: PRESENT
BASE EXCESS BLDA CALC-SCNC: -1.4 MMOL/L
BASE EXCESS BLDA CALC-SCNC: -4 MMOL/L (ref -2–3)
BASE EXCESS BLDA CALC-SCNC: -5 MMOL/L (ref -2–3)
BASE EXCESS BLDA CALC-SCNC: 2 MMOL/L (ref -2–3)
BASO STIPL BLD QL SMEAR: PRESENT
BASOPHILS # BLD MANUAL: 0 THOUSAND/UL (ref 0–0.1)
BASOPHILS # BLD MANUAL: 0 THOUSAND/UL (ref 0–0.1)
BASOPHILS NFR MAR MANUAL: 0 % (ref 0–1)
BASOPHILS NFR MAR MANUAL: 0 % (ref 0–1)
BPU ID: NORMAL
BPU ID: NORMAL
BUN SERPL-MCNC: 50 MG/DL (ref 5–25)
BUN SERPL-MCNC: 65 MG/DL (ref 5–25)
CA-I BLD-SCNC: 1.5 MMOL/L (ref 1.12–1.32)
CA-I BLD-SCNC: 1.5 MMOL/L (ref 1.12–1.32)
CA-I BLD-SCNC: 1.67 MMOL/L (ref 1.12–1.32)
CALCIUM SERPL-MCNC: 10.1 MG/DL (ref 8.4–10.2)
CALCIUM SERPL-MCNC: 9.7 MG/DL (ref 8.4–10.2)
CHLORIDE SERPL-SCNC: 116 MMOL/L (ref 96–108)
CHLORIDE SERPL-SCNC: 116 MMOL/L (ref 96–108)
CO2 SERPL-SCNC: 25 MMOL/L (ref 21–32)
CO2 SERPL-SCNC: 27 MMOL/L (ref 21–32)
CREAT SERPL-MCNC: 0.84 MG/DL (ref 0.6–1.3)
CREAT SERPL-MCNC: 1.17 MG/DL (ref 0.6–1.3)
CROSSMATCH: NORMAL
CROSSMATCH: NORMAL
CRP SERPL QL: 335.8 MG/L
DIFFERENTIAL COMMENT: ABNORMAL
DOHLE BOD BLD QL SMEAR: PRESENT
EOSINOPHIL # BLD MANUAL: 0 THOUSAND/UL (ref 0–0.4)
EOSINOPHIL # BLD MANUAL: 0 THOUSAND/UL (ref 0–0.4)
EOSINOPHIL NFR BLD MANUAL: 0 % (ref 0–6)
EOSINOPHIL NFR BLD MANUAL: 0 % (ref 0–6)
ERYTHROCYTE [DISTWIDTH] IN BLOOD BY AUTOMATED COUNT: 19.7 % (ref 11.6–15.1)
ERYTHROCYTE [DISTWIDTH] IN BLOOD BY AUTOMATED COUNT: 20.2 % (ref 11.6–15.1)
FUNGUS SPEC CULT: ABNORMAL
GFR SERPL CREATININE-BSD FRML MDRD: 51 ML/MIN/1.73SQ M
GFR SERPL CREATININE-BSD FRML MDRD: 76 ML/MIN/1.73SQ M
GLUCOSE SERPL-MCNC: 128 MG/DL (ref 65–140)
GLUCOSE SERPL-MCNC: 181 MG/DL (ref 65–140)
GLUCOSE SERPL-MCNC: 195 MG/DL (ref 65–140)
GLUCOSE SERPL-MCNC: 195 MG/DL (ref 65–140)
GLUCOSE SERPL-MCNC: 209 MG/DL (ref 65–140)
GLUCOSE SERPL-MCNC: 231 MG/DL (ref 65–140)
GLUCOSE SERPL-MCNC: 233 MG/DL (ref 65–140)
GLUCOSE SERPL-MCNC: 241 MG/DL (ref 65–140)
GLUCOSE SERPL-MCNC: 266 MG/DL (ref 65–140)
GLUCOSE SERPL-MCNC: 274 MG/DL (ref 65–140)
HCO3 BLDA-SCNC: 21.8 MMOL/L (ref 22–28)
HCO3 BLDA-SCNC: 22.5 MMOL/L (ref 22–28)
HCO3 BLDA-SCNC: 22.9 MMOL/L (ref 22–28)
HCO3 BLDA-SCNC: 29.3 MMOL/L (ref 22–28)
HCT VFR BLD AUTO: 22.7 % (ref 34.8–46.1)
HCT VFR BLD AUTO: 22.8 % (ref 34.8–46.1)
HCT VFR BLD AUTO: 33.6 % (ref 34.8–46.1)
HCT VFR BLD CALC: 23 % (ref 34.8–46.1)
HCT VFR BLD CALC: <15 % (ref 34.8–46.1)
HCT VFR BLD CALC: <15 % (ref 34.8–46.1)
HGB BLD-MCNC: 10.7 G/DL (ref 11.5–15.4)
HGB BLD-MCNC: 5.8 G/DL (ref 11.5–15.4)
HGB BLD-MCNC: 7.2 G/DL (ref 11.5–15.4)
HGB BLD-MCNC: 7.5 G/DL (ref 11.5–15.4)
HGB BLDA-MCNC: 7.8 G/DL (ref 11.5–15.4)
KAPPA LC FREE SER-MCNC: 11.2 MG/L (ref 3.3–19.4)
KAPPA LC FREE/LAMBDA FREE SER: 1.2 {RATIO} (ref 0.26–1.65)
LACTATE SERPL-SCNC: 2.5 MMOL/L (ref 0.5–2)
LAMBDA LC FREE SERPL-MCNC: 9.3 MG/L (ref 5.7–26.3)
LYMPHOCYTES # BLD AUTO: 0 % (ref 14–44)
LYMPHOCYTES # BLD AUTO: 0 THOUSAND/UL (ref 0.6–4.47)
LYMPHOCYTES # BLD AUTO: 0.12 THOUSAND/UL (ref 0.6–4.47)
LYMPHOCYTES # BLD AUTO: 1 % (ref 14–44)
LYMPHOCYTES NFR BLD AUTO: 17 %
LYMPHOCYTES NFR BLD AUTO: 48 %
MAGNESIUM SERPL-MCNC: 2.1 MG/DL (ref 1.9–2.7)
MCH RBC QN AUTO: 30.5 PG (ref 26.8–34.3)
MCH RBC QN AUTO: 30.6 PG (ref 26.8–34.3)
MCHC RBC AUTO-ENTMCNC: 31.6 G/DL (ref 31.4–37.4)
MCHC RBC AUTO-ENTMCNC: 33 G/DL (ref 31.4–37.4)
MCV RBC AUTO: 93 FL (ref 82–98)
MCV RBC AUTO: 97 FL (ref 82–98)
METAMYELOCYTES NFR BLD MANUAL: 11 % (ref 0–1)
METAMYELOCYTES NFR BLD MANUAL: 9 % (ref 0–1)
MICROCYTES BLD QL AUTO: PRESENT
MONOCYTES # BLD AUTO: 0 THOUSAND/UL (ref 0–1.22)
MONOCYTES # BLD AUTO: 0 THOUSAND/UL (ref 0–1.22)
MONOCYTES NFR BLD: 0 % (ref 4–12)
MONOCYTES NFR BLD: 0 % (ref 4–12)
MYELOCYTES NFR BLD MANUAL: 5 % (ref 0–1)
MYELOCYTES NFR BLD MANUAL: 5 % (ref 0–1)
NEUTROPHILS # BLD MANUAL: 11.85 THOUSAND/UL (ref 1.85–7.62)
NEUTROPHILS # BLD MANUAL: 9.87 THOUSAND/UL (ref 1.85–7.62)
NEUTS BAND NFR BLD MANUAL: 28 % (ref 0–8)
NEUTS BAND NFR BLD MANUAL: 55 % (ref 0–8)
NEUTS SEG NFR BLD AUTO: 28 % (ref 43–75)
NEUTS SEG NFR BLD AUTO: 52 %
NEUTS SEG NFR BLD AUTO: 58 % (ref 43–75)
NEUTS SEG NFR BLD AUTO: 83 %
NRBC BLD AUTO-RTO: 4 /100 WBC (ref 0–2)
NRBC BLD AUTO-RTO: 6 /100 WBC (ref 0–2)
O2 CT BLDA-SCNC: 10.1 ML/DL (ref 16–23)
OSMOLALITY UR/SERPL-RTO: 334 MMOL/KG (ref 282–298)
OSMOLALITY UR/SERPL-RTO: 334 MMOL/KG (ref 282–298)
OSMOLALITY UR/SERPL-RTO: 336 MMOL/KG (ref 282–298)
OSMOLALITY UR/SERPL-RTO: 340 MMOL/KG (ref 282–298)
OSMOLALITY UR/SERPL-RTO: 345 MMOL/KG (ref 282–298)
OSMOLALITY UR/SERPL-RTO: 346 MMOL/KG (ref 282–298)
OSMOLALITY UR/SERPL-RTO: 348 MMOL/KG (ref 282–298)
OXYHGB MFR BLDA: 94.5 % (ref 94–97)
PATHOLOGY REVIEW: YES
PCO2 BLD: 23 MMOL/L (ref 21–32)
PCO2 BLD: 24 MMOL/L (ref 21–32)
PCO2 BLD: 31 MMOL/L (ref 21–32)
PCO2 BLD: 50.1 MM HG (ref 36–44)
PCO2 BLD: 52.1 MM HG (ref 36–44)
PCO2 BLD: 66 MM HG (ref 36–44)
PCO2 BLDA: 36.6 MM HG (ref 36–44)
PH BLD: 7.24 [PH] (ref 7.35–7.45)
PH BLD: 7.25 [PH] (ref 7.35–7.45)
PH BLD: 7.26 [PH] (ref 7.35–7.45)
PH BLDA: 7.42 [PH] (ref 7.35–7.45)
PLATELET # BLD AUTO: 196 THOUSANDS/UL (ref 149–390)
PLATELET # BLD AUTO: 224 THOUSANDS/UL (ref 149–390)
PLATELET BLD QL SMEAR: ADEQUATE
PLATELET BLD QL SMEAR: ADEQUATE
PMV BLD AUTO: 10.8 FL (ref 8.9–12.7)
PMV BLD AUTO: 11 FL (ref 8.9–12.7)
PO2 BLD: 61 MM HG (ref 75–129)
PO2 BLD: 74 MM HG (ref 75–129)
PO2 BLD: 88 MM HG (ref 75–129)
PO2 BLDA: 86.9 MM HG (ref 75–129)
POIKILOCYTOSIS BLD QL SMEAR: PRESENT
POLYCHROMASIA BLD QL SMEAR: PRESENT
POLYCHROMASIA BLD QL SMEAR: PRESENT
POTASSIUM BLD-SCNC: 4.8 MMOL/L (ref 3.5–5.3)
POTASSIUM BLD-SCNC: 4.9 MMOL/L (ref 3.5–5.3)
POTASSIUM BLD-SCNC: 5.7 MMOL/L (ref 3.5–5.3)
POTASSIUM SERPL-SCNC: 4.5 MMOL/L (ref 3.5–5.3)
POTASSIUM SERPL-SCNC: 4.6 MMOL/L (ref 3.5–5.3)
RBC # BLD AUTO: 2.36 MILLION/UL (ref 3.81–5.12)
RBC # BLD AUTO: 2.45 MILLION/UL (ref 3.81–5.12)
RBC MORPH BLD: PRESENT
RBC MORPH BLD: PRESENT
SAO2 % BLD FROM PO2: 86 % (ref 60–85)
SAO2 % BLD FROM PO2: 92 % (ref 60–85)
SAO2 % BLD FROM PO2: 95 % (ref 60–85)
SCAN RESULT: NORMAL
SODIUM BLD-SCNC: 148 MMOL/L (ref 136–145)
SODIUM BLD-SCNC: 149 MMOL/L (ref 136–145)
SODIUM BLD-SCNC: 149 MMOL/L (ref 136–145)
SODIUM SERPL-SCNC: 146 MMOL/L (ref 135–147)
SODIUM SERPL-SCNC: 147 MMOL/L (ref 135–147)
SPECIMEN SOURCE: ABNORMAL
TOTAL CELLS COUNTED SPEC: 100
TOTAL CELLS COUNTED SPEC: 100
TOXIC GRANULES BLD QL SMEAR: PRESENT
UNIT DISPENSE STATUS: NORMAL
UNIT DISPENSE STATUS: NORMAL
UNIT PRODUCT CODE: NORMAL
UNIT PRODUCT CODE: NORMAL
UNIT PRODUCT VOLUME: 350 ML
UNIT PRODUCT VOLUME: 350 ML
UNIT RH: NORMAL
UNIT RH: NORMAL
WBC # BLD AUTO: 11.89 THOUSAND/UL (ref 4.31–10.16)
WBC # BLD AUTO: 13.78 THOUSAND/UL (ref 4.31–10.16)
WBC TOXIC VACUOLES BLD QL SMEAR: PRESENT

## 2024-02-21 PROCEDURE — 86140 C-REACTIVE PROTEIN: CPT | Performed by: STUDENT IN AN ORGANIZED HEALTH CARE EDUCATION/TRAINING PROGRAM

## 2024-02-21 PROCEDURE — 99233 SBSQ HOSP IP/OBS HIGH 50: CPT | Performed by: INTERNAL MEDICINE

## 2024-02-21 PROCEDURE — 88112 CYTOPATH CELL ENHANCE TECH: CPT | Performed by: STUDENT IN AN ORGANIZED HEALTH CARE EDUCATION/TRAINING PROGRAM

## 2024-02-21 PROCEDURE — NC001 PR NO CHARGE: Performed by: INTERNAL MEDICINE

## 2024-02-21 PROCEDURE — 80048 BASIC METABOLIC PNL TOTAL CA: CPT | Performed by: STUDENT IN AN ORGANIZED HEALTH CARE EDUCATION/TRAINING PROGRAM

## 2024-02-21 PROCEDURE — 94664 DEMO&/EVAL PT USE INHALER: CPT

## 2024-02-21 PROCEDURE — 87186 SC STD MICRODIL/AGAR DIL: CPT | Performed by: STUDENT IN AN ORGANIZED HEALTH CARE EDUCATION/TRAINING PROGRAM

## 2024-02-21 PROCEDURE — 85014 HEMATOCRIT: CPT | Performed by: STUDENT IN AN ORGANIZED HEALTH CARE EDUCATION/TRAINING PROGRAM

## 2024-02-21 PROCEDURE — 93005 ELECTROCARDIOGRAM TRACING: CPT

## 2024-02-21 PROCEDURE — 87070 CULTURE OTHR SPECIMN AEROBIC: CPT | Performed by: STUDENT IN AN ORGANIZED HEALTH CARE EDUCATION/TRAINING PROGRAM

## 2024-02-21 PROCEDURE — 94003 VENT MGMT INPAT SUBQ DAY: CPT

## 2024-02-21 PROCEDURE — 94760 N-INVAS EAR/PLS OXIMETRY 1: CPT

## 2024-02-21 PROCEDURE — 82948 REAGENT STRIP/BLOOD GLUCOSE: CPT

## 2024-02-21 PROCEDURE — 83735 ASSAY OF MAGNESIUM: CPT | Performed by: STUDENT IN AN ORGANIZED HEALTH CARE EDUCATION/TRAINING PROGRAM

## 2024-02-21 PROCEDURE — 30233N1 TRANSFUSION OF NONAUTOLOGOUS RED BLOOD CELLS INTO PERIPHERAL VEIN, PERCUTANEOUS APPROACH: ICD-10-PCS | Performed by: INTERNAL MEDICINE

## 2024-02-21 PROCEDURE — 85060 BLOOD SMEAR INTERPRETATION: CPT | Performed by: STUDENT IN AN ORGANIZED HEALTH CARE EDUCATION/TRAINING PROGRAM

## 2024-02-21 PROCEDURE — 83605 ASSAY OF LACTIC ACID: CPT | Performed by: STUDENT IN AN ORGANIZED HEALTH CARE EDUCATION/TRAINING PROGRAM

## 2024-02-21 PROCEDURE — 31624 DX BRONCHOSCOPE/LAVAGE: CPT | Performed by: INTERNAL MEDICINE

## 2024-02-21 PROCEDURE — 85027 COMPLETE CBC AUTOMATED: CPT | Performed by: STUDENT IN AN ORGANIZED HEALTH CARE EDUCATION/TRAINING PROGRAM

## 2024-02-21 PROCEDURE — 88184 FLOWCYTOMETRY/ TC 1 MARKER: CPT | Performed by: STUDENT IN AN ORGANIZED HEALTH CARE EDUCATION/TRAINING PROGRAM

## 2024-02-21 PROCEDURE — P9040 RBC LEUKOREDUCED IRRADIATED: HCPCS

## 2024-02-21 PROCEDURE — 99291 CRITICAL CARE FIRST HOUR: CPT | Performed by: INTERNAL MEDICINE

## 2024-02-21 PROCEDURE — 83930 ASSAY OF BLOOD OSMOLALITY: CPT | Performed by: STUDENT IN AN ORGANIZED HEALTH CARE EDUCATION/TRAINING PROGRAM

## 2024-02-21 PROCEDURE — 99024 POSTOP FOLLOW-UP VISIT: CPT | Performed by: STUDENT IN AN ORGANIZED HEALTH CARE EDUCATION/TRAINING PROGRAM

## 2024-02-21 PROCEDURE — 88305 TISSUE EXAM BY PATHOLOGIST: CPT | Performed by: STUDENT IN AN ORGANIZED HEALTH CARE EDUCATION/TRAINING PROGRAM

## 2024-02-21 PROCEDURE — 89051 BODY FLUID CELL COUNT: CPT | Performed by: STUDENT IN AN ORGANIZED HEALTH CARE EDUCATION/TRAINING PROGRAM

## 2024-02-21 PROCEDURE — 87205 SMEAR GRAM STAIN: CPT | Performed by: STUDENT IN AN ORGANIZED HEALTH CARE EDUCATION/TRAINING PROGRAM

## 2024-02-21 PROCEDURE — 87635 SARS-COV-2 COVID-19 AMP PRB: CPT | Performed by: STUDENT IN AN ORGANIZED HEALTH CARE EDUCATION/TRAINING PROGRAM

## 2024-02-21 PROCEDURE — 82805 BLOOD GASES W/O2 SATURATION: CPT | Performed by: STUDENT IN AN ORGANIZED HEALTH CARE EDUCATION/TRAINING PROGRAM

## 2024-02-21 PROCEDURE — 85018 HEMOGLOBIN: CPT | Performed by: STUDENT IN AN ORGANIZED HEALTH CARE EDUCATION/TRAINING PROGRAM

## 2024-02-21 PROCEDURE — 88185 FLOWCYTOMETRY/TC ADD-ON: CPT | Performed by: STUDENT IN AN ORGANIZED HEALTH CARE EDUCATION/TRAINING PROGRAM

## 2024-02-21 PROCEDURE — 85007 BL SMEAR W/DIFF WBC COUNT: CPT | Performed by: STUDENT IN AN ORGANIZED HEALTH CARE EDUCATION/TRAINING PROGRAM

## 2024-02-21 PROCEDURE — 87106 FUNGI IDENTIFICATION YEAST: CPT | Performed by: STUDENT IN AN ORGANIZED HEALTH CARE EDUCATION/TRAINING PROGRAM

## 2024-02-21 PROCEDURE — 87077 CULTURE AEROBIC IDENTIFY: CPT | Performed by: STUDENT IN AN ORGANIZED HEALTH CARE EDUCATION/TRAINING PROGRAM

## 2024-02-21 RX ORDER — MIDAZOLAM HYDROCHLORIDE 2 MG/2ML
4 INJECTION, SOLUTION INTRAMUSCULAR; INTRAVENOUS ONCE
Status: DISCONTINUED | OUTPATIENT
Start: 2024-02-21 | End: 2024-02-24

## 2024-02-21 RX ORDER — DEXTROSE MONOHYDRATE 50 MG/ML
100 INJECTION, SOLUTION INTRAVENOUS CONTINUOUS
Status: DISCONTINUED | OUTPATIENT
Start: 2024-02-21 | End: 2024-02-21

## 2024-02-21 RX ORDER — ATROPINE SULFATE 0.1 MG/ML
INJECTION INTRAVENOUS
Status: DISPENSED
Start: 2024-02-21 | End: 2024-02-22

## 2024-02-21 RX ORDER — FENTANYL CITRATE 50 UG/ML
100 INJECTION, SOLUTION INTRAMUSCULAR; INTRAVENOUS ONCE
Status: COMPLETED | OUTPATIENT
Start: 2024-02-21 | End: 2024-02-21

## 2024-02-21 RX ORDER — LIDOCAINE HYDROCHLORIDE 10 MG/ML
INJECTION, SOLUTION EPIDURAL; INFILTRATION; INTRACAUDAL; PERINEURAL
Status: DISPENSED
Start: 2024-02-21 | End: 2024-02-22

## 2024-02-21 RX ORDER — OLANZAPINE 5 MG/1
5 TABLET, ORALLY DISINTEGRATING ORAL
Status: DISCONTINUED | OUTPATIENT
Start: 2024-02-21 | End: 2024-02-24

## 2024-02-21 RX ORDER — FENTANYL CITRATE/PF 50 MCG/ML
SYRINGE (ML) INJECTION
Status: COMPLETED
Start: 2024-02-21 | End: 2024-02-21

## 2024-02-21 RX ORDER — INSULIN LISPRO 100 [IU]/ML
1-5 INJECTION, SOLUTION INTRAVENOUS; SUBCUTANEOUS EVERY 6 HOURS SCHEDULED
Status: DISCONTINUED | OUTPATIENT
Start: 2024-02-21 | End: 2024-02-22

## 2024-02-21 RX ORDER — DEXTROSE AND SODIUM CHLORIDE 5; .45 G/100ML; G/100ML
50 INJECTION, SOLUTION INTRAVENOUS CONTINUOUS
Status: DISCONTINUED | OUTPATIENT
Start: 2024-02-21 | End: 2024-02-22

## 2024-02-21 RX ADMIN — DEXMEDETOMIDINE HYDROCHLORIDE 1.2 MCG/KG/HR: 4 INJECTION, SOLUTION INTRAVENOUS at 05:41

## 2024-02-21 RX ADMIN — HYDROMORPHONE HYDROCHLORIDE 0.5 MG: 1 INJECTION, SOLUTION INTRAMUSCULAR; INTRAVENOUS; SUBCUTANEOUS at 10:52

## 2024-02-21 RX ADMIN — CHLORHEXIDINE GLUCONATE 0.12% ORAL RINSE 15 ML: 1.2 LIQUID ORAL at 21:51

## 2024-02-21 RX ADMIN — Medication 1 APPLICATION: at 00:24

## 2024-02-21 RX ADMIN — ACETAMINOPHEN 1000 MG: 10 INJECTION INTRAVENOUS at 15:22

## 2024-02-21 RX ADMIN — INSULIN LISPRO 2 UNITS: 100 INJECTION, SOLUTION INTRAVENOUS; SUBCUTANEOUS at 05:51

## 2024-02-21 RX ADMIN — HEPARIN SODIUM 5000 UNITS: 5000 INJECTION INTRAVENOUS; SUBCUTANEOUS at 05:41

## 2024-02-21 RX ADMIN — MIDAZOLAM 2 MG: 1 INJECTION INTRAMUSCULAR; INTRAVENOUS at 03:34

## 2024-02-21 RX ADMIN — METHYLPREDNISOLONE SODIUM SUCCINATE 125 MG: 125 INJECTION, POWDER, FOR SOLUTION INTRAMUSCULAR; INTRAVENOUS at 21:52

## 2024-02-21 RX ADMIN — DEXMEDETOMIDINE HYDROCHLORIDE 1 MCG/KG/HR: 4 INJECTION, SOLUTION INTRAVENOUS at 15:24

## 2024-02-21 RX ADMIN — NYSTATIN 1 APPLICATION: 100000 POWDER TOPICAL at 08:49

## 2024-02-21 RX ADMIN — ACETAMINOPHEN 1000 MG: 10 INJECTION INTRAVENOUS at 21:52

## 2024-02-21 RX ADMIN — CHLORHEXIDINE GLUCONATE 0.12% ORAL RINSE 15 ML: 1.2 LIQUID ORAL at 08:48

## 2024-02-21 RX ADMIN — HYDROMORPHONE HYDROCHLORIDE 0.5 MG/HR: 10 INJECTION, SOLUTION INTRAMUSCULAR; INTRAVENOUS; SUBCUTANEOUS at 12:38

## 2024-02-21 RX ADMIN — DEXMEDETOMIDINE HYDROCHLORIDE 1.2 MCG/KG/HR: 4 INJECTION, SOLUTION INTRAVENOUS at 10:48

## 2024-02-21 RX ADMIN — HYDROMORPHONE HYDROCHLORIDE 0.5 MG: 1 INJECTION, SOLUTION INTRAMUSCULAR; INTRAVENOUS; SUBCUTANEOUS at 05:27

## 2024-02-21 RX ADMIN — METHYLPREDNISOLONE SODIUM SUCCINATE 125 MG: 125 INJECTION, POWDER, FOR SOLUTION INTRAMUSCULAR; INTRAVENOUS at 08:30

## 2024-02-21 RX ADMIN — HEPARIN SODIUM 5000 UNITS: 5000 INJECTION INTRAVENOUS; SUBCUTANEOUS at 14:47

## 2024-02-21 RX ADMIN — DEXTROSE AND SODIUM CHLORIDE 50 ML/HR: 5; .45 INJECTION, SOLUTION INTRAVENOUS at 21:59

## 2024-02-21 RX ADMIN — INSULIN LISPRO 3 UNITS: 100 INJECTION, SOLUTION INTRAVENOUS; SUBCUTANEOUS at 18:44

## 2024-02-21 RX ADMIN — FENTANYL CITRATE 100 MCG: 50 INJECTION INTRAMUSCULAR; INTRAVENOUS at 16:43

## 2024-02-21 RX ADMIN — MIDAZOLAM 2 MG: 1 INJECTION INTRAMUSCULAR; INTRAVENOUS at 09:51

## 2024-02-21 RX ADMIN — Medication 1 SPRAY: at 00:24

## 2024-02-21 RX ADMIN — DEXMEDETOMIDINE HYDROCHLORIDE 1.2 MCG/KG/HR: 4 INJECTION, SOLUTION INTRAVENOUS at 01:10

## 2024-02-21 RX ADMIN — INSULIN LISPRO 1 UNITS: 100 INJECTION, SOLUTION INTRAVENOUS; SUBCUTANEOUS at 00:24

## 2024-02-21 RX ADMIN — INSULIN LISPRO 2 UNITS: 100 INJECTION, SOLUTION INTRAVENOUS; SUBCUTANEOUS at 12:55

## 2024-02-21 RX ADMIN — SODIUM CHLORIDE 75 ML/HR: 4 INJECTION, SOLUTION, CONCENTRATE INTRAVENOUS at 07:13

## 2024-02-21 RX ADMIN — MIDAZOLAM 2 MG/HR: 5 INJECTION INTRAMUSCULAR; INTRAVENOUS at 04:17

## 2024-02-21 RX ADMIN — ACETAMINOPHEN 1000 MG: 10 INJECTION INTRAVENOUS at 05:41

## 2024-02-21 RX ADMIN — HEPARIN SODIUM 5000 UNITS: 5000 INJECTION INTRAVENOUS; SUBCUTANEOUS at 21:51

## 2024-02-21 RX ADMIN — DEXTROSE 100 ML/HR: 5 SOLUTION INTRAVENOUS at 12:45

## 2024-02-21 RX ADMIN — FENTANYL CITRATE 100 MCG: 50 INJECTION, SOLUTION INTRAMUSCULAR; INTRAVENOUS at 16:43

## 2024-02-21 RX ADMIN — HYDROMORPHONE HYDROCHLORIDE 0.5 MG: 1 INJECTION, SOLUTION INTRAMUSCULAR; INTRAVENOUS; SUBCUTANEOUS at 03:39

## 2024-02-21 RX ADMIN — REMDESIVIR 200 MG: 100 INJECTION, POWDER, LYOPHILIZED, FOR SOLUTION INTRAVENOUS at 14:45

## 2024-02-21 RX ADMIN — MIDAZOLAM 2 MG/HR: 5 INJECTION INTRAMUSCULAR; INTRAVENOUS at 13:19

## 2024-02-21 RX ADMIN — LEVOFLOXACIN 750 MG: 750 INJECTION, SOLUTION INTRAVENOUS at 09:55

## 2024-02-21 RX ADMIN — NYSTATIN 1 APPLICATION: 100000 POWDER TOPICAL at 17:12

## 2024-02-21 NOTE — PLAN OF CARE
Problem: Prexisting or High Potential for Compromised Skin Integrity  Goal: Skin integrity is maintained or improved  Description: INTERVENTIONS:  - Identify patients at risk for skin breakdown  - Assess and monitor skin integrity  - Assess and monitor nutrition and hydration status  - Monitor labs   - Assess for incontinence   - Turn and reposition patient  - Assist with mobility/ambulation  - Relieve pressure over bony prominences  - Avoid friction and shearing  - Provide appropriate hygiene as needed including keeping skin clean and dry  - Evaluate need for skin moisturizer/barrier cream  - Collaborate with interdisciplinary team   - Patient/family teaching  - Consider wound care consult   Outcome: Progressing     Problem: INFECTION - ADULT  Goal: Absence or prevention of progression during hospitalization  Description: INTERVENTIONS:  - Assess and monitor for signs and symptoms of infection  - Monitor lab/diagnostic results  - Monitor all insertion sites, i.e. indwelling lines, tubes, and drains  - Monitor endotracheal if appropriate and nasal secretions for changes in amount and color  - [place] appropriate cooling/warming therapies per order  - Administer medications as ordered  - Instruct and encourage patient and family to use good hand hygiene technique  - Identify and instruct in appropriate isolation precautions for identified infection/condition  Outcome: Progressing     Problem: SAFETY ADULT  Goal: Patient will remain free of falls  Description: INTERVENTIONS:  - Educate patient/family on patient safety including physical limitations  - Instruct patient to call for assistance with activity   - Consult OT/PT to assist with strengthening/mobility   - Keep Call bell within reach  - Keep bed low and locked with side rails adjusted as appropriate  - Keep care items and personal belongings within reach  - Initiate and maintain comfort rounds  - Make Fall Risk Sign visible to staff  - Offer Toileting every  2 Hours, in advance of need  - Initiate/Maintain bed alarm  - Obtain necessary fall risk management equipment: non skid footwear  - Apply yellow socks and bracelet for high fall risk patients  - Consider moving patient to room near nurses station  Outcome: Progressing     Problem: RESPIRATORY - ADULT  Goal: Achieves optimal ventilation and oxygenation  Description: INTERVENTIONS:  - Assess for changes in respiratory status  - Assess for changes in mentation and behavior  - Position to facilitate oxygenation and minimize respiratory effort  - Oxygen administered by appropriate delivery if ordered  - Initiate smoking cessation education as indicated  - Encourage broncho-pulmonary hygiene including cough, deep breathe, Incentive Spirometry  - Assess the need for suctioning and aspirate as needed  - Assess and instruct to report SOB or any respiratory difficulty  - Respiratory Therapy support as indicated  Outcome: Progressing     Problem: SKIN/TISSUE INTEGRITY - ADULT  Goal: Skin Integrity remains intact(Skin Breakdown Prevention)  Description: Assess:  -Perform Flavio assessment every shift   -Clean and moisturize skin every day   -Inspect skin when repositioning, toileting, and assisting with ADLS  -Assess under medical devices such as ronny  every hour   -Assess extremities for adequate circulation and sensation     Bed Management:  -Have minimal linens on bed & keep smooth, unwrinkled  -Change linens as needed when moist or perspiring  -Avoid sitting or lying in one position for more than 2 hours while in bed  -Keep HOB at 45 degrees     Toileting:  -Offer bedside commode  -Assess for incontinence every hour  -Use incontinent care products after each incontinent episode such as moisture barrier cream     Activity:  -Mobilize patient 3 times a day  -Encourage activity and walks on unit  -Encourage or provide ROM exercises   -Turn and reposition patient every 2 Hours  -Use appropriate equipment to lift or move  patient in bed  -Instruct/ Assist with weight shifting every hour when out of bed in chair  -Consider limitation of chair time 2 hour intervals    Skin Care:  -Avoid use of baby powder, tape, friction and shearing, hot water or constrictive clothing  -Relieve pressure over bony prominences using allevyn   -Do not massage red bony areas    Next Steps:  -Teach patient strategies to minimize risks such as weight shifting    -Consider consults to  interdisciplinary teams such as PT  Outcome: Progressing     Problem: Potential for Falls  Goal: Patient will remain free of falls  Description: INTERVENTIONS:  - Educate patient/family on patient safety including physical limitations  - Instruct patient to call for assistance with activity   - Consult OT/PT to assist with strengthening/mobility   - Keep Call bell within reach  - Keep bed low and locked with side rails adjusted as appropriate  - Keep care items and personal belongings within reach  - Initiate and maintain comfort rounds  - Make Fall Risk Sign visible to staff  - Offer Toileting every 2 Hours, in advance of need  - Initiate/Maintain bed alarm  - Obtain necessary fall risk management equipment: non skid footwear  - Apply yellow socks and bracelet for high fall risk patients  - Consider moving patient to room near nurses station  Outcome: Progressing     Problem: SAFETY,RESTRAINT: NV/NON-SELF DESTRUCTIVE BEHAVIOR  Goal: Remains free of harm/injury (restraint for non violent/non self-detsructive behavior)  Description: INTERVENTIONS:  - Instruct patient/family regarding restraint use   - Assess and monitor physiologic and psychological status   - Provide interventions and comfort measures to meet assessed patient needs   - Identify and implement measures to help patient regain control  - Assess readiness for release of restraint   Outcome: Progressing     Problem: COPING  Goal: Pt/Family able to verbalize concerns and demonstrate effective coping  strategies  Description: INTERVENTIONS:  - Assist patient/family to identify coping skills, available support systems and cultural and spiritual values  - Provide emotional support, including active listening and acknowledgement of concerns of patient and caregivers  - Reduce environmental stimuli, as able  - Provide patient education  - Assess for spiritual pain/suffering and initiate spiritual care, including notification of Pastoral Care or [place] based community as needed  - Assess effectiveness of coping strategies  Outcome: Progressing  Goal: Will report anxiety at manageable levels  Description: INTERVENTIONS:  - Administer medication as ordered  - Teach and encourage coping skills  - Provide emotional support  - Assess patient/family for anxiety and ability to cope  Outcome: Progressing     Problem: BEHAVIOR  Goal: Pt/Family maintain appropriate behavior and adhere to behavioral management agreement, if implemented  Description: INTERVENTIONS:  - Assess the family dynamic   - Encourage verbalization of thoughts and concerns in a socially appropriate manner  - Assess patient/family's coping skills and non-compliant behavior (including use of illegal substances).  - Utilize positive, consistent limit setting strategies supporting safety of patient, staff and others  - Initiate consult with Case Management, Spiritual Care or other ancillary services as appropriate  - If a patient's/visitor's behavior jeopardizes the safety of the patient, staff, or others, refer to organization procedure.   - Notify Security of behavior or suspected illegal substances which indicate the need for search of the patient and/or belongings  - Encourage participation in the decision making process about a behavioral management agreement; implement if patient meets criteria  Outcome: Progressing

## 2024-02-21 NOTE — PROGRESS NOTES
[facility]  Progress Note: Critical Care  Name: Carla Hunt 59 y.o. female I MRN: [id number]  Unit/Bed#: MICU 10 I Date of Admission: 1/10/2024   Date of Service: 2/21/2024 I Hospital Day: 42    Assessment/Plan     Acute hypoxic respiratory failure due to severe covid/covid induced pneumonitis  Partial cecal volvulus s/p right hemicolectomy  Stenotrophomonas pneumonia  Steroid induced hyperglycemia  Acute kidney Injury, resolving  B cell lymphoma  History of small SAH  CKD stage III  Hypertriglyceridemia  Constipation  Sacral ulcer, unstageable  Seizure disorder  Anxiety     Neuro:   Sedation: Off propofol, On precedex drip 1.2, versed drip at 2  -Aim to maintain RASS 0 to -1, preferably -1 (drowsy) since patient has had difficulty tolerating vent.  -Repeat triglyceride level at 305 from 02/20     Diagnosis: seizure disorder  -S/p partial left temporal lobe resection in 2007  -Contuinue home lamictal 150 bid and topamax 100 bid     Diagnosis: Anxiety  -On venlafaxine 25 mg TID via NGT  -Seroquel 12.5 hs and melatonin 6 hs for sleep     CV:   Diagnosis: Distributive shock  -On levo 2 to maintain MAPs > 65 mmHg     Pulm:  Diagnosis: Acute hypoxic respiratory failure due to severe covid  and covid induced fibrosis  -Tested COVID positive on 1/7  -Completed severe COVID pathway and 7 days CTX  -Tenuous respiratory status requiring multiple re-admissions to MICU  -S/p Bronch 2/2 and 02/16  -NG on cultures (initially showed non-group A strep)  -PCP and AFB negative   -Legionella, viral, fungal cultures no growth to date  -Pulse dose steroids with solumedrol 1g daily x3 days (completed 2/7) then transitioned to prednisone 80mg daily on 2/8  -Off veletri  -Completed IVIG for 5 days  -CRP trend 221 (02/19) > 116.9 (02/20) > 94.2 (02/20) > 335.8 (02/21) likely elevated due to recent cecal volvulus and patient being postoperative for this  -Currently on solumedrol 125mg Q12H, would  recommend continuing this for now. 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) though unclear how this was diagnosed, this also does not enirely fit as she is NOT hypokalemic  -Continue to monitor I/O and UOP     Diagnosis: Acute kidney injury, sCr trend 1.81 > 1 > 1.69 > 1.5 > 1.17 today, improving  Etiology: Likely due to obstructive uropathy  -On IVF with 0.22% NS   -Monitor with daily BMP  -Nephrology input noted  -Avoid nephrotoxic medications and contrast studies if possible     Diagnosis: Hyperkalemia, resolved  -K at 4.5 today     Diagnosis: Hypercalcemia, resolved  -At 10.1 today     F/E/N:   F: ON IVF with 0.22% NS for now  E: monitor and replete for goal K>4, P>3, Mg>2  N: NPO with low continuous suction     Heme/Onc:   Diagnosis: B cell lymphoma  -Follows with heme/onc at [facility]  -On rituxan for 2 year course, started May 2022  -Last dose was 12/20, treatment currently on hold   -Leukopenic on admission but WBC now wnl     DVT ppx with Heparin subcutaneous (switched from lovenox due to ELIESER)     Endo:   Diagnosis: steroid hyperglycemia and diabetes mellitus type 2, A1c at 6.6 from 01/2024  -Goal -180  -BG range last 24hrs 175-231  -On SSI for now  -Will hold off on insulin drip at this time since patient NPO     ID:   Diagnosis: Severe covid pneumonia and stenotrophomonas pneumonia  -Completed severe COVID pathway with decadron (ended 1/22) and remdesivir (ended 1/20), also completed 7 days CTX on 1/15  -C/f opportunistic infections given  immunocompromised status on chemotherapy and recent steroid use  -No consolidations on CXR and procal negative  -S/p bactrim and minocycline x5 days for stenotrophomonas on sputum culture (1/25), then on bactrim for PJP ppx  -Bronc on 2/2 - Cx w/o growth (initially resulted as 1+ alpha hemolytic strep), AFB & PCP negative, f/u fungal, legionella, and viral cultures   -2/10 pt again had escalating O2 requirements on floors necessitating readmission to ICU  -Concern that with recent pulse dose steroids and now worsening respiratory status she could have infection, possibly opportunistic   -UA with moderate leukocytes, nitrite negative, 30-50 WBC, trace protein.  -Repeat sputum culture 2/9 with 4+ stenotrophomonas  -Bronch cultures with candida albicans, Rare gram positive cocci in pairs  -On high dose bactrim, received 10 days of this. Was also on vancomycin and cefepime. As per ID recommendations from 02/221, now off all three and switched to levaquin 750 mg IV ever 48 hours, Plan to continue for 5 days.  -As per ID recommendations, can consider use of remdesevir for 5 day since covid positive PCR on recent bronchopscopic BAL sample  -On levaquin 750 mg day 3 of 5       MSK/Skin:   -Wound care team following for bilateral sacral wounds    Disposition: Critical care    ICU Core Measures     Vented Patient  VAP Bundle  VAP bundle ordered     A: Assess, Prevent, and Manage Pain Has pain been assessed? Yes  Need for changes to pain regimen? No   B: Both Spontaneous Awakening Trials (SATs) and Spontaneous Breathing Trials (SBTs) Plan to perform spontaneous awakening trial today? Modified and patient remained on precedex   Plan to perform spontaneous breathing trial today? Modified and patient remained on precedex   Obvious barriers to extubation? Yes   C: Choice of Sedation RASS Goal: -1 Drowsy  Need for changes to sedation or analgesia regimen? No   D: Delirium CAM-ICU: Negative   E: Early Mobility  Plan for early  mobility? Yes   F: Family Engagement Plan for family engagement today? Yes       Antibiotic Review: Patient on appropriate coverage based on culture data.     Review of Invasive Devices:    Ortiz Plan: Continue for accurate I/O monitoring for 48 hours  Central access plan: Patient has multiple central venous catheters.   Amelia Plan: Keep arterial line for frequent ABGs    Prophylaxis:  VTE VTE covered by:  heparin (porcine), Subcutaneous, 5,000 Units at 02/21/24 0541       Stress Ulcer  not ordered        Significant 24hr Events     24hr events: Desaturations to 80s, increased FiO2 to 95%. On pSIMV, pressure support 8. On levo 3, on 2 versed, precedex 1.2. Fluids switched to 0.25% NS.      Subjective   Patient seen by bedside, will discuss medical updates and plan with patients family today.    Review of Systems   Unable to perform ROS: Patient nonverbal        Objective                            Vitals I/O      Most Recent Min/Max in 24hrs   Temp 99.5 °F (37.5 °C) Temp  Min: 98.1 °F (36.7 °C)  Max: 100.6 °F (38.1 °C)   Pulse 77 Pulse  Min: 77  Max: 131   Resp 18 Resp  Min: 13  Max: 28   BP 90/50 BP  Min: 90/50  Max: 90/50   O2 Sat 95 % SpO2  Min: 85 %  Max: 98 %   Precedex 1.2, Versed at 2, levo 2. On 0.22% NS 75 mL/hr. Vent settings: SIMV RR14, PEEP10, FiO2 80%.   Intake/Output Summary (Last 24 hours) at 2/21/2024 0631  Last data filed at 2/21/2024 0615  Gross per 24 hour   Intake 7641.33 ml   Output 6720 ml   Net 921.33 ml       Diet NPO    Invasive Monitoring   Arterial Line  Amelia /47  Arterial Line BP  Min: 92/54  Max: 158/53   MAP 70 mmHg  Arterial Line MAP (mmHg)  Min: 62 mmHg  Max: 106 mmHg           Physical Exam   Physical Exam  Vitals reviewed.   Skin:     General: Skin is warm.      Capillary Refill: Capillary refill takes less than 2 seconds.   HENT:      Head: Normocephalic.   Cardiovascular:      Rate and Rhythm: Normal rate and regular rhythm.      Pulses: Normal pulses.   Abdominal: General:  An ostomy site is present. Bowel sounds are normal. There is ostomy site.     Palpations: Abdomen is soft.   Constitutional:       Appearance: She is ill-appearing.      Interventions: She is sedated and intubated.      Comments: Has an ETT tube, NGT set to low continuous suction, marie in place. Ostomy pouch with bilious fluid noted and surrounding site CDI. Also has left arterial line and a CVC. 1x PIVs.   Pulmonary:      Effort: Pulmonary effort is normal. She is intubated.      Comments: Diminished breath sounds in left lower lung field. Mechanically ventilated breath sounds  Neurological:      Comments: Patient appears sedated, unable to follow commands this morning but able to move extremities spontaneously.    Genitourinary/Anorectal:  Marie present.          Diagnostic Studies      EKG: Reviewed  Imaging: Reviewed I have personally reviewed pertinent reports.       Medications:  Scheduled PRN   acetaminophen, 1,000 mg, Q8H SARTHAK  chlorhexidine, 15 mL, Q12H SARTHAK  heparin (porcine), 5,000 Units, Q8H SARTHAK  insulin lispro, 1-5 Units, Q6H SARTHAK  levofloxacin, 750 mg, Q48H  methylPREDNISolone sodium succinate, 125 mg, Q12H SARTHAK  nystatin, , BID  sodium bicarbonate, 25 mEq, Once      HYDROmorphone, 0.5 mg, Q1H PRN  midazolam, 2 mg, Q4H PRN  ondansetron, 4 mg, Q6H PRN       Continuous    dexmedetomidine, 0.1-1.2 mcg/kg/hr, Last Rate: 1.2 mcg/kg/hr (02/21/24 0541)  midazolam, 2 mg/hr, Last Rate: 2 mg/hr (02/21/24 0417)  norepinephrine, 1-30 mcg/min, Last Rate: 2 mcg/min (02/20/24 2036)  sodium chloride infusion 0.225 %, 75 mL/hr, Last Rate: 75 mL/hr (02/21/24 0519)         Labs:    CBC    Recent Labs     02/20/24 1759 02/21/24  0553   WBC 11.89* 13.78*   HGB 7.5* 7.2*   HCT 22.7* 22.8*    196     BMP    Recent Labs     02/20/24  1759 02/21/24  0403   SODIUM 151* 147   K 4.9 4.5   * 116*   CO2 27 27   AGAP 9 4   BUN 77* 65*   CREATININE 1.56* 1.17   CALCIUM 10.4* 10.1       Coags    No recent results      Additional Electrolytes  Recent Labs     02/20/24  1759 02/21/24  0553   MG 2.5 2.1   PHOS 4.0  --    CAIONIZED 1.37*  --           Blood Gas    Recent Labs     02/21/24  0806   PHART 7.415   CUY5XHE 36.6   PO2ART 86.9   PVR3AQA 22.9   BEART -1.4   SOURCE Line, Arterial     Recent Labs     02/19/24  1023 02/20/24  0000 02/21/24  0806   PHVEN 7.404*  --   --    QCE5YWE 35.8*  --   --    PO2VEN 107.6*  --   --    RRX0QYW 21.9*  --   --    BEVEN -2.5  --   --    H2PECOU 96.1*  --   --    SOURCE  --    < > Line, Arterial    < > = values in this interval not displayed.    LFTs  Recent Labs     02/20/24  1010   ALT 18   AST 20   ALKPHOS 68   ALB 3.0*   TBILI 0.51       Infectious  No recent results  Glucose  Recent Labs     02/20/24  0449 02/20/24  1010 02/20/24  1759 02/21/24  0403   GLUC 185* 118 175* 195*               Miguel Whittaker MD

## 2024-02-21 NOTE — OP NOTE
OPERATIVE REPORT  PATIENT NAME: Carla Hunt    :  1965  MRN: [id number]  Pt Location: BE OR ROOM 10    SURGERY DATE: 2024    Surgeons and Role:     * Lauryn Ullrich, DO - Primary     * Nancy Pond MD - Assisting     * Tanika Snow MD - Observing    Preop Diagnosis:  Cecal volvulus (HCC) [K56.2]    Post-Op Diagnosis Codes:     * Cecal volvulus (HCC) [K56.2]    Procedure(s):  LAPAROSCOPY DIAGNOSTIC. EXPLORATORY LAPAROTOMY. ILEOCECTOMY. END ILEOSTOMY. MUCUS FISTULA    Specimen(s):  ID Type Source Tests Collected by Time Destination   1 : RIGHT COLON Tissue Colon TISSUE EXAM Lauryn Ullrich, DO 2024 1505        Estimated Blood Loss:   50 mL    Drains:  NG/OG/Enteral Tube Nasogastric 18 Fr Left nare (Active)   Placement Reverification Auscultation 24 0800   Site Assessment Clean;Dry;Intact 24 0800   Status Suction-low intermittent 24 0800   Drainage Appearance Bile;Thick 24 0800   Intake (mL) 30 mL 24 1700   Output (mL) 20 mL 24 0600   Number of days: 1       Ileostomy RUQ (Active)   Stoma Assessment Pink 24 0000   Peristomal Assessment Clean;Intact 24 0000   Output (mL) 0 mL 24 0600   Number of days: 1       Urethral Catheter Latex 16 Fr. (Active)   Reasons to continue Urinary Catheter  Accurate I&O assessment in critically ill patients (48 hr. max) 24 0800   Goal for Removal No longer needed- Will place order to discontinue 24 2000   Site Assessment Clean;Skin intact 24 0800   Ortiz Care Done 24 0900   Collection Container Standard drainage bag 24 0800   Securement Method Securing device (Describe) 24 0800   Output (mL) 200 mL 24 1705   Number of days: 1       Anesthesia Type:   Choice    Operative Indications:  Cecal volvulus (HCC) [K56.2]      Operative Findings:  Largely distended and dusky cecum with serosal tear, consistent with impending perforation  Ileocectomy performed with end ileostomy and  mucus fistula        Complications:   None    Procedure and Technique:  The patient was brought to the operating room previously intubated from the MICU and transferred to the operating room table after confirmation of name, MRN, and date of birth with assistance from hospital identifier band.  Consent had been obtained preoperatively from the patient's spouse.  She was positioned supine and prepped and draped in the usual sterile fashion.  The patient was on standing antibiotics.  A timeout was performed verifying the correct patient, procedure, and site, and all parties were in agreement.      Initial attempts at diagnostic laparoscopy were attempted via a cutdown approach however due to bowel distention, decision was made to convert to an exploratory laparotomy.  A large midline incision was created using a #10 blade scalpel, Bovie electrocautery was utilized to cut down and expose the fascia.  The previously created cutdown incision was extended to enter the abdomen safely.    Upon entering the abdomen the cecum was noted to be dusky and massively distended and eviscerated from the abdomen with notable large serosal tear consistent with impending perforation.  The proximal and distal bowel appeared viable and healthy.  A RAGHAVENDRA stapler was utilized to transect the proximal ileum and distal large bowel to safe and healthy margins and ileocectomy was performed using enseal device to take down the mesenteric attachment.    The small bowel was run in its entirety twice from the ligament of Treitz to the distal end of the resected ileum.  All four quadrants were inspected with no obvious additional areas of concern. The ileum and proximal large bowel were mobilized to allow for an end ileostomy and mucus fistula to be brought up through the rectus muscles.      The abdomen was copiously irrigated with several liters of warm fluids and there appeared to be no gross contamination.  The fascia was closed with running PDS  suture followed by skin closure with staples.  A Mepilex was applied to the midline incision and covered with several green towels.    The end ileostomy and mucous fistula was next addressed.  The end ileostomy was brooked and appeared viable and healthy.  An ostomy appliance was attached after cleansing the surrounding skin.      All counts were correct at the end of the case as well as verification with radiofrequency wanding.  The patient was transferred from the operating room table to her ICU bed and transported to the MICU having tolerated the procedure well with no immediate complications.  Dr. Pablo was present for the entirety of the procedure.         Patient Disposition:  Critical Care Unit    This procedure was not performed to treat colon cancer through resection      SIGNATURE: Nancy Pond MD  DATE: February 21, 2024  TIME: 6:01 PM

## 2024-02-21 NOTE — RESPIRATORY THERAPY NOTE
RT Ventilator Management Note  Carla Hunt 59 y.o. female MRN: [id number]  Unit/Bed#: [facility] 10 Encounter: [id number]      Daily Screen         2/19/2024  0733 2/20/2024  0745          Patient safety screen outcome:: Failed Failed      Not Ready for Weaning due to:: Underline problem not resolved Underline problem not resolved;FiO2 >60%;PEEP > 8cmH2O                Physical Exam:   Assessment Type: (P) Assess only  General Appearance: Sedated  Respiratory Pattern: Assisted  Chest Assessment: Chest expansion symmetrical  Bilateral Breath Sounds: Diminished, Coarse      Resp Comments: (P) Pt. doing well on pressure control SIMV. FiO2 titrated to 95% at this time. Will continue to monitor and titrate FiO2 as able.

## 2024-02-21 NOTE — WOUND OSTOMY CARE
Wound/ostomy team is consulted on 60 yo female s/p ileostomy with mucus fistula POD# 1. Patient is currently intubated and sedated in the MICU. One piece ostomy pouch adjacent to midline stapled incision and is currently leaking onto the incision. Ostomy pouch off set from incision and used strip paste in order to create barrier between stoma and incision. Mepilex placed over midline incision. Wound/ostomy team will follow closely for more medically appropriate time for ostomy teaching.         Red, budded stoma with proximal os and fisutla, peristomal skin is intact. Small amount of brown liquid stool noted in pouch.              Molly Bustos BSN, RN, CWOCN

## 2024-02-21 NOTE — SOCIAL WORK
"  Palliative MSW saw patient at the bedside today. MSW appreciates the opportunity to provider patient/family with inpatient emotional support and guidance while patient continues to receive medical attention from the medical team     Topics discussed: MSW touched base with the patient's  and the patient's brother (Farhan).  The patient's  reports that he is doing okay and that his wish is for her to be well enough to come off of the vent. MSW processed the frustration with \"waiting\" and provided support. The patient's  stated that they are going to go to dinner tonight for his son's birthday, and that his daughter will be visiting later today. MSW encouraged the patient's  and brother to reach out to our department if any questions or concerns arise.     Areas that need follow-up: Ongoing emotional support.     Resources given: MSW and Department Contact Card.    Others present: The patient's , Farhan (Brother), and Estefania (Palliative Care MSW).    I have spent 25 minutes with the patient's family today in which greater than 50% of this time was spent in counseling/coordination of care regarding  Ongoing emotional support. .     MSW will continue to follow as requested by the medical team, patient, or family.    "

## 2024-02-21 NOTE — PROGRESS NOTES
Spiritual Care Progress Note    2024  Patient: Carla Hunt : 1965  Admission Date & Time: 1/10/2024 1941  MRN: [id number] CSN: [id number]       attempted to visit but pt was resting at this time.    Chaplains still remain available.

## 2024-02-21 NOTE — QUICK NOTE
Electrolytes improved. Renal function back to baseline. Nephrology will sign off. Please call/text with questions.

## 2024-02-21 NOTE — RESPIRATORY THERAPY NOTE
Resp Vent Note   02/21/24 0828   Respiratory Assessment   Assessment Type Assess only   General Appearance Sedated   Respiratory Pattern Assisted   Chest Assessment Chest expansion symmetrical   Resp Comments Pt remains on documented vent settings, will cont to monitor per resp prot.   O2 Device G5   Vent Information   Vent ID [id number]   Vent type Summers G5   Summers Vent Mode P-SIMV/PSIMV+   $ Pulse Oximetry Spot Check Charge Completed   P-SIMV/PSIMV+ Settings   Resp Rate (BPM) 14 BPM   Pressure Control/Pinsp (cmH20) 8 cmH20   Insp Time (S) 1 sec   FIO2 (%) 80 %   PEEP (cmH2O) 10 cmH2O   Pressure Support 4 cm H2O   Flow Trigger (LPM) 5 LPM   P-ramp (ms) 50 (ms)   ETS (%) 40 %   Humidification Heater   Heater Temp 87.8 °F (31 °C)   P-SIMV/PSIMV+ Actuals   Resp Rate (BPM) 34 BPM   VT (mL) 635 mL   MV (Obs) 15.1   MAP (cmH20) 13 cmH2O   Peak Pressure (cmH2O) 26 cmH2O   I:E Ratio (Obs) 1:1.7   Heater Temperature (Obs) 331 °F (166.1 °C)   Static Compliance (mL/cmH20) 41.8 mL/cmH2O   Plateau Pressure (cm H2O) 27 cm H2O   P-SIMV/PSIMV+ Alarms   High Peak Pressure (cmH20) 45 cm H2O   Low Pressure (cmH20) 5 cm H2O   High Resp Rate (BPM) 50 BPM   Low Resp Rate (BPM) 8 BPM   High MV (L/min) 25 L/min   Low MV (L/min) 3 L/min   High VT (mL) 1500 mL   Low VT (mL) 200 mL   Apnea Time (s) 20 S   P-SIMV/PSIMV+ Apnea Settings   Resp Rate (BPM) 15 BPM   Pressure Control Set (cmH2O) 15 cm H2O   Insp Time (S) 1.3 sec   Apnea Time (s) 20 S   Maintenance   Alarm (pink) cable attached No   Resuscitation bag with peep valve at bedside Yes   Water bag changed No   Circuit changed No   Daily Screen   Patient safety screen outcome: Failed   Not Ready for Weaning due to: Underline problem not resolved;PEEP > 8cmH2O;FiO2 >60%   IHI Ventilator Associated Pneumonia Bundle   Daily Assessment of Readiness to Extubate Yes   Head of Bed Elevated HOB 30   ETT  Cuffed 7.5 mm   Placement Date/Time: 02/13/24 (c) 5722   Type: Cuffed  Tube Size: 7.5 mm   Location: Oral  Insertion attempts: 1  Placement Verification: End tidal CO2;Symmetrical chest wall movement  Secured at (cm): 23   Secured at (cm) 23   Measured from Lips   Secured Location Center   Secured by Commercial tube escudero   Site Condition Dry   Cuff Pressure (color) Green   HI-LO Suction  Intermittent suction   HI-LO Secretions Scant   HI-LO Intervention Patent

## 2024-02-21 NOTE — WOUND OSTOMY CARE
Progress Note - Wound   Carla Hunt 59 y.o. female MRN: [id number]  [facility]: [id number]        Assessment:   Patient seen today for wound care follow up assessment. Patient is . Patient is dependent for all care, intubated and sedated in MICU, on critical care low air loss mattress. Patient is NPO. Patient is turned and repositioned with green foam wedges.     HA unstageable pressure ulcer now stage III-  100% pink tissue, now partial thickness, scant sanguineous drainage as tissue is fragile.    B/L heels intact and blanching, preventative skin care orders placed.     No induration, fluctuance, odor, warmth/temperature differences, redness, or purulence noted to the above noted wounds and skin areas assessed. New dressings applied per orders listed below. Patient tolerated well- no s/s of non-verbal pain or discomfort observed during the encounter. Bedside nurse aware of plan of care. See flow sheets for more detailed assessment findings. Wound care will continue to follow.       Plan:   Cleanse b/l sacro-buttocks with soap and water, pat dry, and apply bordered silicone foam dressing to cover the wounds. Change every other day and as needed for soilage/dislodgement.     Cleanse b/l groin with soap and water, pat dry, and dust the skin lightly with nystatin powder per order. May use alginate rope to skin folds if skin is open or macerated. Change BID and PRN.    Wound 02/12/24 Pressure Injury Buttocks (Active)   Wound Image   02/21/24 1428   Wound Description Pink;Fragile 02/21/24 1428   Pressure Injury Stage 3 02/21/24 1428   Sofie-wound Assessment Fragile 02/21/24 1428   Wound Length (cm) 1.5 cm 02/21/24 1428   Wound Width (cm) 1 cm 02/21/24 1428   Wound Depth (cm) 0.1 cm 02/21/24 1428   Wound Surface Area (cm^2) 1.5 cm^2 02/21/24 1428   Wound Volume (cm^3) 0.15 cm^3 02/21/24 1428   Calculated Wound Volume (cm^3) 0.15 cm^3 02/21/24 1428   Wound Site Closure WILL 02/21/24 1428   Drainage  Amount Scant 02/21/24 1428   Drainage Description Sanguineous 02/21/24 1428   Non-staged Wound Description Partial thickness 02/21/24 1428   Treatments Cleansed 02/21/24 1428   Dressing Foam, Silicon (eg. Allevyn, etc) 02/21/24 1428   Wound packed? No 02/21/24 1428   Dressing Changed New 02/21/24 1428   Patient Tolerance Tolerated well 02/21/24 1428   Dressing Status Clean;Dry;Intact 02/21/24 1428       Molly LAYNEN, RN, CWOCN

## 2024-02-21 NOTE — UTILIZATION REVIEW
"Continued Stay Review    Date: 02/21                          Current Patient Class: IP  Current Level of Care: Level 2 stepdown/HOT    HPI:59 y.o. female initially admitted on 01/10    Assessment/Plan: Pt desatted to the 80s,  increased FiO2 to 95%. On pSIMV, pressure support 8. On levo 3, on 2 versed, precedex 1.2. Fluids switched to 0.25% NS. Comnt MV w/ SIMV-PS and wean as tolerated . Cont sedation- start Dilaudid infusion and try to wean off Versed. Continue Precedex. Start Zyprexa ODT nightly. Cont antiseizure meds. Cont norepi, Map >65. Bronchoscopy today to obtain BAL and clear secretions. Continue Solu-Medrol for now. NPO. Keep OG tube to intermittent suction. GI ppx. Cont IVFs. Check bedside US to eval hydronephrosis improvement. Trend H&H later today, transfuse w/ hgb >7. Cont IV abx. Start another course of remdesivir for persistent COVID-19 PCR on BAL. Cont SSI.      Vital Signs: BP 90/50   Pulse 63   Temp 97.7 °F (36.5 °C) (Esophageal)   Resp 16   Ht 5' 8\" (1.727 m)   Wt 69.9 kg (154 lb)   SpO2 98%   BMI 23.42 kg/m²       Pertinent Labs/Diagnostic Results:   Results from last 7 days   Lab Units 02/16/24  1209   SARS-COV-2  Detected*     Results from last 7 days   Lab Units 02/21/24  0553 02/20/24  1759 02/20/24  1523 02/20/24  1515 02/20/24  1412 02/20/24  0449 02/19/24  0452 02/18/24  0537 02/17/24  0511   WBC Thousand/uL 13.78* 11.89*  --   --   --  25.15* 29.45*   < > 22.93*   HEMOGLOBIN g/dL 7.2* 7.5*  --   --   --  7.9* 8.3*   < > 9.5*   I STAT HEMOGLOBIN g/dl  --   --   --   --  7.8*  --   --   --   --    HEMATOCRIT % 22.8* 22.7*  --   --   --  24.5* 25.1*   < > 28.3*   HEMATOCRIT, ISTAT %  --   --  <15* <15* 23*  --   --   --   --    PLATELETS Thousands/uL 196 224  --   --   --  363 349   < > 268   BANDS PCT % 28* 55*  --   --   --   --   --   --  1    < > = values in this interval not displayed.         Results from last 7 days   Lab Units 02/21/24  0553 02/21/24  0403 02/20/24  1759 " 02/20/24  1523 02/20/24  1515 02/20/24  1412 02/20/24  1010 02/20/24  0449 02/19/24  1132 02/19/24  0452 02/18/24  0537   SODIUM mmol/L  --  147 151*  --   --   --  148* 148* 142 139 133*   POTASSIUM mmol/L  --  4.5 4.9  --   --   --  6.1* 6.1* 5.8* 5.9* 5.6*   CHLORIDE mmol/L  --  116* 115*  --   --   --  113* 110* 110* 106 101   CO2 mmol/L  --  27 27  --   --   --  27 28 24 21 21   CO2, I-STAT mmol/L  --   --   --  23 24 31  --   --   --   --   --    ANION GAP mmol/L  --  4 9  --   --   --  8 10 8 12 11   BUN mg/dL  --  65* 77*  --   --   --  95* 89* 102* 107* 105*   CREATININE mg/dL  --  1.17 1.56*  --   --   --  1.62* 1.50* 1.69* 1.81* 1.92*   EGFR ml/min/1.73sq m  --  51 36  --   --   --  34 38 33 30 28   CALCIUM mg/dL  --  10.1 10.4*  --   --   --  11.8* 11.6* 10.6* 11.3* 10.9*   CALCIUM, IONIZED mmol/L  --   --  1.37*  --   --   --   --   --   --   --   --    CALCIUM, IONIZED, ISTAT mmol/L  --   --   --  1.50* 1.50* 1.67*  --   --   --   --   --    MAGNESIUM mg/dL 2.1  --  2.5  --   --   --   --  3.0*  --  2.9* 3.0*   PHOSPHORUS mg/dL  --   --  4.0  --   --   --   --   --   --   --   --      Results from last 7 days   Lab Units 02/20/24  1010 02/16/24  1833   AST U/L 20 17   ALT U/L 18 24   ALK PHOS U/L 68 72   TOTAL PROTEIN g/dL 6.9 8.3   ALBUMIN g/dL 3.0* 3.1*   TOTAL BILIRUBIN mg/dL 0.51 0.68   BILIRUBIN DIRECT mg/dL 0.16 0.20     Results from last 7 days   Lab Units 02/21/24  1209 02/21/24  0551 02/21/24  0000 02/20/24  2015 02/20/24  1737 02/20/24  1154 02/20/24  1000 02/20/24  0801 02/20/24  0636 02/20/24  0413 02/20/24  0152 02/19/24  2359   POC GLUCOSE mg/dl 241* 231* 181* 202* 190* 90 155* 362* 188* 192* 149* 106     Results from last 7 days   Lab Units 02/21/24  0403 02/20/24  1759 02/20/24  1010 02/20/24  0449 02/19/24  1132 02/19/24  0452 02/18/24  0537 02/17/24  0511 02/16/24  0432 02/15/24  0448   GLUCOSE RANDOM mg/dL 195* 175* 118 185* 185* 397* 275* 247* 142* 113     Results from last 7 days  "  Lab Units 02/21/24  1301 02/21/24  0807 02/21/24  0403 02/21/24  0021 02/20/24 2011 02/20/24  1759 02/20/24  1212 02/20/24  0758 02/20/24  0449 02/19/24  2359   OSMOLALITY, SERUM mmol/* 345* 348* 346* 348* 353* 358* 366* 362* 350*         No results found for: \"BETA-HYDROXYBUTYRATE\"   Results from last 7 days   Lab Units 02/21/24  0806 02/20/24  1819 02/20/24  0455   PH ART  7.415 7.380 7.314*   PCO2 ART mm Hg 36.6 41.4 55.4*   PO2 ART mm Hg 86.9 70.0* 70.9*   HCO3 ART mmol/L 22.9 23.9 27.5   BASE EXC ART mmol/L -1.4 -1.1 0.8   O2 CONTENT ART mL/dL 10.1* 10.1* 11.7*   O2 HGB, ARTERIAL % 94.5 91.4* 90.9*   ABG SOURCE  Line, Arterial Line, Arterial Line, Arterial     Results from last 7 days   Lab Units 02/19/24  1023   PH NICA  7.404*   PCO2 NICA mm Hg 35.8*   PO2 NICA mm Hg 107.6*   HCO3 NICA mmol/L 21.9*   BASE EXC NICA mmol/L -2.5   O2 CONTENT NICA ml/dL 11.3   O2 HGB, VENOUS % 96.1*     Results from last 7 days   Lab Units 02/20/24  1523 02/20/24  1515 02/20/24  1412   I STAT BASE EXC mmol/L -5* -4* 2   I STAT O2 SAT % 92* 95* 86*   ISTAT PH ART  7.247* 7.244* 7.256*   I STAT ART PCO2 mm HG 50.1* 52.1* 66.0*   I STAT ART PO2 mm HG 74.0* 88.0 61.0*   I STAT ART HCO3 mmol/L 21.8* 22.5 29.3*                         Results from last 7 days   Lab Units 02/18/24  0537 02/17/24  0908   PROCALCITONIN ng/ml 0.90* 0.55*     Results from last 7 days   Lab Units 02/20/24  1759 02/20/24  0758   LACTIC ACID mmol/L 2.7* 2.9*                         Results from last 7 days   Lab Units 02/21/24  0555   UNIT PRODUCT CODE  [id number]  [id number]   UNIT NUMBER  [id number]  [id number]   UNITABO  A  A   UNITRH  NEG  NEG   CROSSMATCH  Compatible  Compatible   UNIT DISPENSE STATUS  Presumed Trans  Crossmatched   UNIT PRODUCT VOL ml 350  350         Results from last 7 days   Lab Units 02/16/24  0432   LIPASE u/L 47     Results from last 7 days   Lab Units 02/21/24  0553 02/20/24  0449 02/19/24  0452 02/18/24  0537 " 02/17/24  0511   CRP mg/L 335.8* 94.2* 116.9* 221.0* 288.4*         Results from last 7 days   Lab Units 02/21/24  1301 02/21/24  0807 02/21/24  0403 02/21/24  0021 02/20/24 2011 02/20/24  1759 02/20/24  1212 02/15/24  1544 02/15/24  0752   OSMOLALITY, SERUM mmol/* 345* 348* 346* 348* 353* 358*   < > 301*   OSMO UR mmol/KG  --   --   --   --   --   --   --   --  475    < > = values in this interval not displayed.     Results from last 7 days   Lab Units 02/20/24  0758 02/19/24  1356 02/18/24  1442 02/15/24  0752   CLARITY UA  Clear  --  Turbid  --    COLOR UA  Light Yellow  --  Light Yellow  --    SPEC GRAV UA  1.017  --  1.015  --    PH UA  6.0  --  5.5  --    GLUCOSE UA mg/dl Negative  --  Negative  --    KETONES UA mg/dl Negative  --  Negative  --    BLOOD UA  Moderate*  --  Large*  --    PROTEIN UA mg/dl Negative  --  Trace*  --    NITRITE UA  Negative  --  Negative  --    BILIRUBIN UA  Negative  --  Negative  --    UROBILINOGEN UA (BE) mg/dl <2.0  --  <2.0  --    LEUKOCYTES UA  Negative  --  Moderate*  --    WBC UA /hpf 2-4*  --  30-50*  --    RBC UA /hpf Innumerable*  --  30-50*  --    BACTERIA UA /hpf None Seen  --  Occasional  --    EPITHELIAL CELLS WET PREP /hpf None Seen  --  Occasional  --    MUCUS THREADS   --   --  Occasional*  --    SODIUM UR   --   --   --  57   CREATININE UR mg/dL  --  26.1  --   --            Results from last 7 days   Lab Units 02/17/24  1818 02/16/24  1214 02/16/24  1209   BLOOD CULTURE  No Growth at 72 hrs.  No Growth at 72 hrs.  --   --    GRAM STAIN RESULT   --  No polys seen*  Rare Gram positive cocci in pairs* No Polys or Bacteria seen     Results from last 7 days   Lab Units 02/16/24  1209   TOTAL COUNTED  100         Results from last 7 days   Lab Units 02/19/24  0452   VANCOMYCIN RM ug/mL 42.8*       Medications:   Scheduled Medications:  acetaminophen, 1,000 mg, Intravenous, Q8H SARTHAK  chlorhexidine, 15 mL, Mouth/Throat, Q12H SARTHAK  heparin (porcine), 5,000 Units,  Subcutaneous, Q8H SARTHAK  insulin lispro, 1-5 Units, Subcutaneous, Q6H SARTHAK  levofloxacin, 750 mg, Intravenous, Q48H  methylPREDNISolone sodium succinate, 125 mg, Intravenous, Q12H SARTHAK  nystatin, , Topical, BID  OLANZapine, 5 mg, Oral, HS  remdesivir, 200 mg, Intravenous, Q24H   Followed by  [START ON 2/22/2024] remdesivir, 100 mg, Intravenous, Q24H      Continuous IV Infusions:  dexmedetomidine, 0.1-1.2 mcg/kg/hr, Intravenous, Titrated  dextrose, 100 mL/hr, Intravenous, Continuous  HYDROmorphone, 0.5 mg/hr, Intravenous, Continuous  midazolam, 2 mg/hr, Intravenous, Continuous  norepinephrine, 1-30 mcg/min, Intravenous, Titrated      PRN Meds:  HYDROmorphone, 0.5 mg, Intravenous, Q1H PRN 02/21 x 3  midazolam, 2 mg, Intravenous, Q4H PRN 02/21 x 2  ondansetron, 4 mg, Intravenous, Q6H PRN        Discharge Plan: D    Network Utilization Review Department  ATTENTION: Please call with any questions or concerns to [phone] and carefully listen to the prompts so that you are directed to the right person. All voicemails are confidential.   For Discharge needs, contact Care Management DC Support Team at [phone] opt. 2  Send all requests for admission clinical reviews, approved or denied determinations and any other requests to dedicated fax number below belonging to the campus where the patient is receiving treatment. List of dedicated fax numbers for the Facilities:  FACILITY NAME UR FAX NUMBER   ADMISSION DENIALS (Administrative/Medical Necessity) [phone]   DISCHARGE SUPPORT TEAM (NETWORK) [phone]   PARENT CHILD HEALTH (Maternity/NICU/Pediatrics) [phone]   [facility] [phone]   [facility] [phone]   [facility] [phone]   [facility] [phone]   [facility] [phone]   [facility] [phone]   [facility]  [phone]   [facility] [phone]   [facility] [phone]   [facility] [phone]   [facility] [phone]   [facility] [phone]

## 2024-02-21 NOTE — PROGRESS NOTES
"Progress Note - Carla Hunt 59 y.o. female MRN: [id number]    [facility] [id number]      Assessment:  59F w/ COVID/ strep pna, b-cell lymphoma, and CKD w/ cecal volvulus now s/p ex-lap, ileocecectomy, end ileostomy w/ mucus fistula on 2/20     Levo @ 2  Hgb 7.2 (7.5)  WBC 13.78 (11.89)  Cr 1.17 (1.56)    Uo-4.7L  NG-1.5L (120 overnight)  Ileostomy-400    Plan:  -Continue NPO/NGT for now  -Wean vent as tolerated  -Monitor ostomy along with output  -No need for current abx from abd standpoint, continue abx for pna  -Wean pressors as tolerated  -Remainder of care per ICU    Subjective:   Intubated/sedated    Objective:     Vitals: Blood pressure 90/50, pulse (!) 54, temperature 99.1 °F (37.3 °C), temperature source Esophageal, resp. rate 17, height 5' 8\" (1.727 m), weight 69.9 kg (154 lb), SpO2 95%.,Body mass index is 23.42 kg/m².      Intake/Output Summary (Last 24 hours) at 2/21/2024 1021  Last data filed at 2/21/2024 1010  Gross per 24 hour   Intake 6609.72 ml   Output 7190 ml   Net -580.28 ml       Physical Exam:   General: ill-appearing  HENT: NCAT MMM  Neck: supple, no JVD  CV: nl rate  Lungs: nl wob. No resp distress  ABD: Soft, nontender, nondistended, dressing is c/d/I. Ostomy viable with thin stool in bag.  Extrem: No CCE  Neuro: sedated       Invasive Devices       Central Venous Catheter Line  Duration             CVC Central Lines 02/20/24 <1 day              Peripheral Intravenous Line  Duration             Peripheral IV 02/18/24 Right;Ventral (anterior) Forearm 2 days              Arterial Line  Duration             Arterial Line 02/14/24 Radial 7 days              Drain  Duration             Ileostomy RUQ <1 day    NG/OG/Enteral Tube Nasogastric 18 Fr Left nare <1 day    Urethral Catheter Latex 16 Fr. <1 day              Airway  Duration             ETT  Cuffed 7.5 mm 7 days                    "

## 2024-02-21 NOTE — QUICK NOTE
Post Op Check    Patient seen and examined bedside.  Intubated and sedated  Ostomy with liquid bowel sweat per bag; 400 cc recorded  NG placed to suction - no PO meds    PC 8 PEEP 10  Levo at 2  Tm 100.6 yesterday 8am, no fevers postop

## 2024-02-21 NOTE — PROGRESS NOTES
Progress Note - Infectious Disease   Carla Hunt 59 y.o. female MRN: [id number]  [facility] [id number]      Impression/Recommendations:  Recurrent acute hypoxic respiratory failure.  Initially, on admission early January, secondary to mostly COVID but there was concern for concurrent bacterial pneumonia on admission due to elevated procalcitonin. Patient completed treatment course for both COVID and bacterial pneumonia.  She was clinically improved with decreasing O2 requirement.  However, patient deteriorated in late January, requiring to be placed back on high flow O2 support.  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  Procalcitonin x 3 were in the normal range.  Patient has no fever or leukocytosis.  She improved with increasing steroid dose.  She completed a 5-day course of Bactrim/minocycline for presumptive stenotrophomonas respiratory infection, with expectorated sputum growing stenotrophomonas.  She subsequently had bronchoscopy, with BAL culture negative for bacterial growth, AFB and PCP but completed the course of Bactrim/minocycline prior to bronchoscopy.  Patient improved and O2 was being weaned again until 2 weeks ago, when she deteriorated again.  She is now back in ICU.  Expectorated sputum once again is growing stenotrophomonas.  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  Not sure that the stenotrophomonas that is growing again and expectorated sputum is pathogenic versus upper airway colonization.  Patient was restarted on high-dose steroid and Bactrim.  Patient's respiratory status remained tenuous throughout, required intubation subsequently.  She had bronchoscopy on 2/16 with moderate secretion.  BAL culture had no growth but COVID PCR was positive.  Bactrim was changed to Levaquin over weekend due to ELIESER.  Remdesivir was restarted for possible COVID relapse.  Due to worsening  consolidations on CT, plan for repeat bronchoscopy to assess for superinfection.  Will continue Levaquin pending findings and culture from bronchoscopy.  Continue Levaquin.  Treat x 14 days total antibiotic this time around.  Continue high-dose systemic corticosteroid per critical service  Monitor temperature/WBC.  Plan for repeat bronchoscopy noted.     Severe COVID, present on admission.  Patient was treated with remdesivir, dexamethasone and was given 1 dose of Tocilizumab on admission.  She also had a course of antibiotic initially for presumptive bacterial superinfection.  COVID antigen was negative prior to coming off isolation.  Current chest CT is showing extensive fibrotic changes.  Persistently positive COVID PCR in BAL noted.  This may be all prolonged shedding.  However, given immunosuppressed state and lack of response to antibiotic and high-dose steroid thus far, will treat patient with another course of remdesivir.  Restart remdesivir x 5-10-day course.     CKD.  Creatinine worsened over weekend, likely secondary to Bactrim.  Patient is now off Bactrim.  Creatinine is stable.  Monitor creatinine.     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  Patient is status post exploratory laparotomy with ileocecectomy and end ileostomy creation.  Surgery follow-up.     Seizure disorder, status post temporal lobe resection in 2007.     B-cell lymphoma, on chemotherapy, which is currently postponed.  Patient was leukopenic on admission but resolved.  Monitor WBC/ANC.     Discussed with patient's  in detail regarding all above and overall poor prognosis.  Discussed with Dr. Eubanks from critical care medicine service regarding continuation of Levaquin and remdesivir, and probable need for bronchoscopy to assess for superinfection.  He is agreeable and plans to repeat bronchoscopy later today.       Antibiotics:  Levaquin # 3  Antibiotic # 12     Subjective:  Patient remains intubated.  She remains  sedated and minimally responsive  Temperature stays down.  No chills.  She is tolerating antibiotic well.  No nausea, vomiting or diarrhea.    Objective:  Vitals:  Temp:  [98.1 °F (36.7 °C)-100.2 °F (37.9 °C)] 98.4 °F (36.9 °C)  HR:  [] 71  Resp:  [13-33] 17  SpO2:  [85 %-98 %] 94 %  Temp (24hrs), Av.2 °F (37.3 °C), Min:98.1 °F (36.7 °C), Max:100.2 °F (37.9 °C)  Current: Temperature: 98.4 °F (36.9 °C)    Physical Exam:     General: Sedated on ventilator, minimally responsive to verbal stimuli, comfortable, nontoxic.   Neck:  Supple. No mass.  No lymphadenopathy.   Lungs: Expansion symmetric, diffuse rhonchi, no rales, no wheezing.   Heart:  Regular rate and rhythm, S1 and S2 normal, no murmur.   Abdomen: Soft, nondistended, non-tender, bowel sounds active all four quadrants, no masses, no organomegaly.   Extremities: Stable mild leg edema. No erythema/warmth. No ulcer. Nontender to palpation.   Skin:  No rash.   Neuro: Not assessable.     Invasive Devices       Central Venous Catheter Line  Duration             CVC Central Lines 24 <1 day              Peripheral Intravenous Line  Duration             Peripheral IV 24 Right;Ventral (anterior) Forearm 2 days              Arterial Line  Duration             Arterial Line 24 Radial 7 days              Drain  Duration             Urethral Catheter Latex 16 Fr. 1 day    Ileostomy RUQ <1 day    NG/OG/Enteral Tube Nasogastric 18 Fr Left nare <1 day              Airway  Duration             ETT  Cuffed 7.5 mm 7 days                    Labs studies:   I have personally reviewed pertinent labs.  Results from last 7 days   Lab Units 24  0403 24  1759 24  1523 24  1515 24  1412 24  1412 24  1010 24  0511 24  1833   POTASSIUM mmol/L 4.5 4.9  --   --   --   --  6.1*   < >  --    CHLORIDE mmol/L 116* 115*  --   --   --   --  113*   < >  --    CO2 mmol/L 27 27  --   --   --   --  27   < >  --    CO2,  I-STAT mmol/L  --   --  23 24   < > 31  --   --   --    BUN mg/dL 65* 77*  --   --   --   --  95*   < >  --    CREATININE mg/dL 1.17 1.56*  --   --   --   --  1.62*   < >  --    EGFR ml/min/1.73sq m 51 36  --   --   --   --  34   < >  --    GLUCOSE, ISTAT mg/dl  --   --  195* 209*  --  128  --   --   --    CALCIUM mg/dL 10.1 10.4*  --   --   --   --  11.8*   < >  --    AST U/L  --   --   --   --   --   --  20  --  17   ALT U/L  --   --   --   --   --   --  18  --  24   ALK PHOS U/L  --   --   --   --   --   --  68  --  72    < > = values in this interval not displayed.     Results from last 7 days   Lab Units 02/21/24  0553 02/20/24  1759 02/20/24  1412 02/20/24  0449   WBC Thousand/uL 13.78* 11.89*  --  25.15*   HEMOGLOBIN g/dL 7.2* 7.5*  --  7.9*   I STAT HEMOGLOBIN g/dl  --   --  7.8*  --    PLATELETS Thousands/uL 196 224  --  363     Results from last 7 days   Lab Units 02/18/24  0926 02/17/24  1818 02/16/24  1214 02/16/24  1209 02/14/24  1340   BLOOD CULTURE   --  No Growth at 72 hrs.  No Growth at 72 hrs.  --   --   --    SPUTUM CULTURE   --   --   --   --  1+ Growth of   GRAM STAIN RESULT   --   --  No polys seen*  Rare Gram positive cocci in pairs* No Polys or Bacteria seen 2+ Polys  1+ Epithelial Cells  No bacteria seen   MRSA CULTURE ONLY  No Methicillin Resistant Staphlyococcus aureus (MRSA) isolated  --   --   --   --        Imaging Studies:   I have personally reviewed pertinent imaging study reports and images in PACS.    EKG, Pathology, and Other Studies:   I have personally reviewed pertinent reports.

## 2024-02-22 ENCOUNTER — APPOINTMENT (INPATIENT)
Dept: RADIOLOGY | Facility: HOSPITAL | Age: 59
DRG: 003 | End: 2024-02-22
Payer: COMMERCIAL

## 2024-02-22 LAB
1,25(OH)2D3 SERPL-MCNC: 78.8 PG/ML (ref 24.8–81.5)
ABO GROUP BLD BPU: NORMAL
ABO GROUP BLD BPU: NORMAL
ALBUMIN SERPL ELPH-MCNC: 2.65 G/DL (ref 3.2–5.1)
ALBUMIN SERPL ELPH-MCNC: 51.9 % (ref 48–70)
ALBUMIN UR ELPH-MCNC: 22.1 %
ALPHA1 GLOB MFR UR ELPH: 31.8 %
ALPHA1 GLOB SERPL ELPH-MCNC: 0.4 G/DL (ref 0.15–0.47)
ALPHA1 GLOB SERPL ELPH-MCNC: 7.8 % (ref 1.8–7)
ALPHA2 GLOB MFR UR ELPH: 7.6 %
ALPHA2 GLOB SERPL ELPH-MCNC: 0.58 G/DL (ref 0.42–1.04)
ALPHA2 GLOB SERPL ELPH-MCNC: 11.3 % (ref 5.9–14.9)
ANION GAP SERPL CALCULATED.3IONS-SCNC: 3 MMOL/L
ANION GAP SERPL CALCULATED.3IONS-SCNC: 5 MMOL/L
ANION GAP SERPL CALCULATED.3IONS-SCNC: 5 MMOL/L
ANION GAP SERPL CALCULATED.3IONS-SCNC: 6 MMOL/L
ATRIAL RATE: 107 BPM
B-GLOBULIN MFR UR ELPH: 12.1 %
BASE EXCESS BLDA CALC-SCNC: -5 MMOL/L
BASE EXCESS BLDA CALC-SCNC: -5.5 MMOL/L
BASE EXCESS BLDA CALC-SCNC: -5.7 MMOL/L
BASE EXCESS BLDA CALC-SCNC: -7 MMOL/L
BASE EXCESS BLDA CALC-SCNC: 1.1 MMOL/L
BETA GLOB ABNORMAL SERPL ELPH-MCNC: 0.24 G/DL (ref 0.31–0.57)
BETA1 GLOB SERPL ELPH-MCNC: 4.8 % (ref 4.7–7.7)
BETA2 GLOB SERPL ELPH-MCNC: 4.7 % (ref 3.1–7.9)
BETA2+GAMMA GLOB SERPL ELPH-MCNC: 0.24 G/DL (ref 0.2–0.58)
BPU ID: NORMAL
BPU ID: NORMAL
BUN SERPL-MCNC: 46 MG/DL (ref 5–25)
BUN SERPL-MCNC: 48 MG/DL (ref 5–25)
BUN SERPL-MCNC: 49 MG/DL (ref 5–25)
BUN SERPL-MCNC: 50 MG/DL (ref 5–25)
CALCIUM SERPL-MCNC: 10.1 MG/DL (ref 8.4–10.2)
CALCIUM SERPL-MCNC: 10.3 MG/DL (ref 8.4–10.2)
CALCIUM SERPL-MCNC: 10.4 MG/DL (ref 8.4–10.2)
CALCIUM SERPL-MCNC: 10.6 MG/DL (ref 8.4–10.2)
CHLORIDE SERPL-SCNC: 114 MMOL/L (ref 96–108)
CHLORIDE SERPL-SCNC: 115 MMOL/L (ref 96–108)
CO2 SERPL-SCNC: 26 MMOL/L (ref 21–32)
CO2 SERPL-SCNC: 28 MMOL/L (ref 21–32)
CO2 SERPL-SCNC: 28 MMOL/L (ref 21–32)
CO2 SERPL-SCNC: 29 MMOL/L (ref 21–32)
CREAT SERPL-MCNC: 0.78 MG/DL (ref 0.6–1.3)
CREAT SERPL-MCNC: 0.92 MG/DL (ref 0.6–1.3)
CREAT SERPL-MCNC: 0.99 MG/DL (ref 0.6–1.3)
CREAT SERPL-MCNC: 1.04 MG/DL (ref 0.6–1.3)
CROSSMATCH: NORMAL
CROSSMATCH: NORMAL
ERYTHROCYTE [DISTWIDTH] IN BLOOD BY AUTOMATED COUNT: 20 % (ref 11.6–15.1)
GAMMA GLOB ABNORMAL SERPL ELPH-MCNC: 0.99 G/DL (ref 0.4–1.66)
GAMMA GLOB MFR UR ELPH: 26.4 %
GAMMA GLOB SERPL ELPH-MCNC: 19.5 % (ref 6.9–22.3)
GFR SERPL CREATININE-BSD FRML MDRD: 59 ML/MIN/1.73SQ M
GFR SERPL CREATININE-BSD FRML MDRD: 63 ML/MIN/1.73SQ M
GFR SERPL CREATININE-BSD FRML MDRD: 68 ML/MIN/1.73SQ M
GFR SERPL CREATININE-BSD FRML MDRD: 84 ML/MIN/1.73SQ M
GLUCOSE SERPL-MCNC: 148 MG/DL (ref 65–140)
GLUCOSE SERPL-MCNC: 153 MG/DL (ref 65–140)
GLUCOSE SERPL-MCNC: 159 MG/DL (ref 65–140)
GLUCOSE SERPL-MCNC: 167 MG/DL (ref 65–140)
GLUCOSE SERPL-MCNC: 171 MG/DL (ref 65–140)
GLUCOSE SERPL-MCNC: 190 MG/DL (ref 65–140)
GLUCOSE SERPL-MCNC: 201 MG/DL (ref 65–140)
GLUCOSE SERPL-MCNC: 219 MG/DL (ref 65–140)
GLUCOSE SERPL-MCNC: 225 MG/DL (ref 65–140)
GLUCOSE SERPL-MCNC: 245 MG/DL (ref 65–140)
GLUCOSE SERPL-MCNC: 249 MG/DL (ref 65–140)
GLUCOSE SERPL-MCNC: 251 MG/DL (ref 65–140)
GLUCOSE SERPL-MCNC: 258 MG/DL (ref 65–140)
HCO3 BLDA-SCNC: 22.6 MMOL/L (ref 22–28)
HCO3 BLDA-SCNC: 23.2 MMOL/L (ref 22–28)
HCO3 BLDA-SCNC: 25.9 MMOL/L (ref 22–28)
HCO3 BLDA-SCNC: 26.5 MMOL/L (ref 22–28)
HCO3 BLDA-SCNC: 26.8 MMOL/L (ref 22–28)
HCT VFR BLD AUTO: 36 % (ref 34.8–46.1)
HGB BLD-MCNC: 11.4 G/DL (ref 11.5–15.4)
IGG/ALB SER: 1.08 {RATIO} (ref 1.1–1.8)
INTERPRETATION UR IFE-IMP: NORMAL
LACTATE SERPL-SCNC: 1.5 MMOL/L (ref 0.5–2)
MAGNESIUM SERPL-MCNC: 2 MG/DL (ref 1.9–2.7)
MCH RBC QN AUTO: 29.8 PG (ref 26.8–34.3)
MCHC RBC AUTO-ENTMCNC: 31.7 G/DL (ref 31.4–37.4)
MCV RBC AUTO: 94 FL (ref 82–98)
NRBC BLD AUTO-RTO: 5 /100 WBCS
O2 CT BLDA-SCNC: 13.7 ML/DL (ref 16–23)
O2 CT BLDA-SCNC: 14.6 ML/DL (ref 16–23)
O2 CT BLDA-SCNC: 15.3 ML/DL (ref 16–23)
O2 CT BLDA-SCNC: 15.5 ML/DL (ref 16–23)
O2 CT BLDA-SCNC: 15.7 ML/DL (ref 16–23)
OSMOLALITY UR/SERPL-RTO: 332 MMOL/KG (ref 282–298)
OSMOLALITY UR/SERPL-RTO: 343 MMOL/KG (ref 282–298)
OXYHGB MFR BLDA: 92.6 % (ref 94–97)
OXYHGB MFR BLDA: 94.5 % (ref 94–97)
OXYHGB MFR BLDA: 95.9 % (ref 94–97)
OXYHGB MFR BLDA: 96.4 % (ref 94–97)
OXYHGB MFR BLDA: 97.6 % (ref 94–97)
P AXIS: 67 DEGREES
PCO2 BLDA: 103.3 MM HG (ref 36–44)
PCO2 BLDA: 47.8 MM HG (ref 36–44)
PCO2 BLDA: 53.8 MM HG (ref 36–44)
PCO2 BLDA: 63.7 MM HG (ref 36–44)
PCO2 BLDA: 95.8 MM HG (ref 36–44)
PH BLDA: 7.02 [PH] (ref 7.35–7.45)
PH BLDA: 7.06 [PH] (ref 7.35–7.45)
PH BLDA: 7.18 [PH] (ref 7.35–7.45)
PH BLDA: 7.24 [PH] (ref 7.35–7.45)
PH BLDA: 7.37 [PH] (ref 7.35–7.45)
PLATELET # BLD AUTO: 237 THOUSANDS/UL (ref 149–390)
PMV BLD AUTO: 11.4 FL (ref 8.9–12.7)
PO2 BLDA: 109.5 MM HG (ref 75–129)
PO2 BLDA: 157 MM HG (ref 75–129)
PO2 BLDA: 86.3 MM HG (ref 75–129)
PO2 BLDA: 93.6 MM HG (ref 75–129)
PO2 BLDA: 96.3 MM HG (ref 75–129)
POTASSIUM SERPL-SCNC: 4.1 MMOL/L (ref 3.5–5.3)
POTASSIUM SERPL-SCNC: 4.5 MMOL/L (ref 3.5–5.3)
POTASSIUM SERPL-SCNC: 5 MMOL/L (ref 3.5–5.3)
POTASSIUM SERPL-SCNC: 5.3 MMOL/L (ref 3.5–5.3)
PR INTERVAL: 128 MS
PROT PATTERN SERPL ELPH-IMP: ABNORMAL
PROT PATTERN UR ELPH-IMP: NORMAL
PROT SERPL-MCNC: 5.1 G/DL (ref 6.4–8.2)
PROT UR-MCNC: 22.6 MG/DL
PS SUPP: 10
PS SUPP: 14
PS SUPP: 8
QRS AXIS: 44 DEGREES
QRSD INTERVAL: 70 MS
QT INTERVAL: 284 MS
QTC INTERVAL: 379 MS
RBC # BLD AUTO: 3.82 MILLION/UL (ref 3.81–5.12)
SIMV VENT INSPIRED AIR FIO2: 100
SIMV VENT INSPIRED AIR FIO2: 100
SIMV VENT INSPIRED AIR FIO2: 70
SIMV VENT INSPIRED AIR FIO2: 80
SIMV VENT INSPIRED AIR FIO2: 90
SIMV VENT PC: 14
SIMV VENT PC: 14
SIMV VENT PEEP: 10
SIMV VENT PEEP: 8
SIMV VENT: ABNORMAL
SODIUM SERPL-SCNC: 146 MMOL/L (ref 135–147)
SODIUM SERPL-SCNC: 146 MMOL/L (ref 135–147)
SODIUM SERPL-SCNC: 147 MMOL/L (ref 135–147)
SODIUM SERPL-SCNC: 150 MMOL/L (ref 135–147)
SPECIMEN SOURCE: ABNORMAL
T WAVE AXIS: 58 DEGREES
UNIT DISPENSE STATUS: NORMAL
UNIT DISPENSE STATUS: NORMAL
UNIT PRODUCT CODE: NORMAL
UNIT PRODUCT CODE: NORMAL
UNIT PRODUCT VOLUME: 350 ML
UNIT PRODUCT VOLUME: 350 ML
UNIT RH: NORMAL
UNIT RH: NORMAL
VENT SIMV: 12
VENT SIMV: 20
VENT SIMV: 24
VENTRICULAR RATE: 107 BPM
WBC # BLD AUTO: 23.58 THOUSAND/UL (ref 4.31–10.16)

## 2024-02-22 PROCEDURE — 99024 POSTOP FOLLOW-UP VISIT: CPT | Performed by: STUDENT IN AN ORGANIZED HEALTH CARE EDUCATION/TRAINING PROGRAM

## 2024-02-22 PROCEDURE — 84166 PROTEIN E-PHORESIS/URINE/CSF: CPT | Performed by: STUDENT IN AN ORGANIZED HEALTH CARE EDUCATION/TRAINING PROGRAM

## 2024-02-22 PROCEDURE — 94760 N-INVAS EAR/PLS OXIMETRY 1: CPT

## 2024-02-22 PROCEDURE — 93010 ELECTROCARDIOGRAM REPORT: CPT | Performed by: INTERNAL MEDICINE

## 2024-02-22 PROCEDURE — 99233 SBSQ HOSP IP/OBS HIGH 50: CPT | Performed by: INTERNAL MEDICINE

## 2024-02-22 PROCEDURE — 83605 ASSAY OF LACTIC ACID: CPT | Performed by: STUDENT IN AN ORGANIZED HEALTH CARE EDUCATION/TRAINING PROGRAM

## 2024-02-22 PROCEDURE — 82948 REAGENT STRIP/BLOOD GLUCOSE: CPT

## 2024-02-22 PROCEDURE — 83735 ASSAY OF MAGNESIUM: CPT | Performed by: STUDENT IN AN ORGANIZED HEALTH CARE EDUCATION/TRAINING PROGRAM

## 2024-02-22 PROCEDURE — 94003 VENT MGMT INPAT SUBQ DAY: CPT

## 2024-02-22 PROCEDURE — 99291 CRITICAL CARE FIRST HOUR: CPT | Performed by: INTERNAL MEDICINE

## 2024-02-22 PROCEDURE — 99232 SBSQ HOSP IP/OBS MODERATE 35: CPT | Performed by: PHYSICIAN ASSISTANT

## 2024-02-22 PROCEDURE — 80048 BASIC METABOLIC PNL TOTAL CA: CPT | Performed by: STUDENT IN AN ORGANIZED HEALTH CARE EDUCATION/TRAINING PROGRAM

## 2024-02-22 PROCEDURE — 83930 ASSAY OF BLOOD OSMOLALITY: CPT | Performed by: STUDENT IN AN ORGANIZED HEALTH CARE EDUCATION/TRAINING PROGRAM

## 2024-02-22 PROCEDURE — 84165 PROTEIN E-PHORESIS SERUM: CPT | Performed by: STUDENT IN AN ORGANIZED HEALTH CARE EDUCATION/TRAINING PROGRAM

## 2024-02-22 PROCEDURE — 82805 BLOOD GASES W/O2 SATURATION: CPT | Performed by: EMERGENCY MEDICINE

## 2024-02-22 PROCEDURE — 71045 X-RAY EXAM CHEST 1 VIEW: CPT

## 2024-02-22 PROCEDURE — 82805 BLOOD GASES W/O2 SATURATION: CPT | Performed by: STUDENT IN AN ORGANIZED HEALTH CARE EDUCATION/TRAINING PROGRAM

## 2024-02-22 PROCEDURE — 86335 IMMUNFIX E-PHORSIS/URINE/CSF: CPT | Performed by: STUDENT IN AN ORGANIZED HEALTH CARE EDUCATION/TRAINING PROGRAM

## 2024-02-22 PROCEDURE — 85027 COMPLETE CBC AUTOMATED: CPT | Performed by: STUDENT IN AN ORGANIZED HEALTH CARE EDUCATION/TRAINING PROGRAM

## 2024-02-22 RX ORDER — LEVOFLOXACIN 5 MG/ML
750 INJECTION, SOLUTION INTRAVENOUS EVERY 24 HOURS
Status: COMPLETED | OUTPATIENT
Start: 2024-02-22 | End: 2024-02-23

## 2024-02-22 RX ORDER — ACETAMINOPHEN 10 MG/ML
1000 INJECTION, SOLUTION INTRAVENOUS EVERY 8 HOURS PRN
Status: DISCONTINUED | OUTPATIENT
Start: 2024-02-22 | End: 2024-02-26

## 2024-02-22 RX ORDER — METHYLPREDNISOLONE SODIUM SUCCINATE 40 MG/ML
40 INJECTION, POWDER, LYOPHILIZED, FOR SOLUTION INTRAMUSCULAR; INTRAVENOUS EVERY 12 HOURS SCHEDULED
Status: DISCONTINUED | OUTPATIENT
Start: 2024-02-23 | End: 2024-02-24

## 2024-02-22 RX ORDER — GABAPENTIN 100 MG/1
100 CAPSULE ORAL 3 TIMES DAILY
Status: DISCONTINUED | OUTPATIENT
Start: 2024-02-22 | End: 2024-02-24

## 2024-02-22 RX ADMIN — HYDROMORPHONE HYDROCHLORIDE 0.5 MG: 1 INJECTION, SOLUTION INTRAMUSCULAR; INTRAVENOUS; SUBCUTANEOUS at 20:58

## 2024-02-22 RX ADMIN — NYSTATIN: 100000 POWDER TOPICAL at 08:14

## 2024-02-22 RX ADMIN — METHYLPREDNISOLONE SODIUM SUCCINATE 125 MG: 125 INJECTION, POWDER, FOR SOLUTION INTRAMUSCULAR; INTRAVENOUS at 08:14

## 2024-02-22 RX ADMIN — OLANZAPINE 5 MG: 5 TABLET, ORALLY DISINTEGRATING ORAL at 21:01

## 2024-02-22 RX ADMIN — DEXTROSE 250 ML: 5 SOLUTION INTRAVENOUS at 22:20

## 2024-02-22 RX ADMIN — INSULIN LISPRO 2 UNITS: 100 INJECTION, SOLUTION INTRAVENOUS; SUBCUTANEOUS at 00:17

## 2024-02-22 RX ADMIN — CHLORHEXIDINE GLUCONATE 0.12% ORAL RINSE 15 ML: 1.2 LIQUID ORAL at 08:14

## 2024-02-22 RX ADMIN — INSULIN LISPRO 2 UNITS: 100 INJECTION, SOLUTION INTRAVENOUS; SUBCUTANEOUS at 06:10

## 2024-02-22 RX ADMIN — REMDESIVIR 100 MG: 100 INJECTION, POWDER, LYOPHILIZED, FOR SOLUTION INTRAVENOUS at 12:44

## 2024-02-22 RX ADMIN — NYSTATIN: 100000 POWDER TOPICAL at 18:09

## 2024-02-22 RX ADMIN — HEPARIN SODIUM 5000 UNITS: 5000 INJECTION INTRAVENOUS; SUBCUTANEOUS at 21:01

## 2024-02-22 RX ADMIN — DEXMEDETOMIDINE HYDROCHLORIDE 0.2 MCG/KG/HR: 4 INJECTION, SOLUTION INTRAVENOUS at 16:50

## 2024-02-22 RX ADMIN — HYDROMORPHONE HYDROCHLORIDE 1 MG/HR: 10 INJECTION, SOLUTION INTRAMUSCULAR; INTRAVENOUS; SUBCUTANEOUS at 06:10

## 2024-02-22 RX ADMIN — CHLORHEXIDINE GLUCONATE 0.12% ORAL RINSE 15 ML: 1.2 LIQUID ORAL at 20:19

## 2024-02-22 RX ADMIN — GABAPENTIN 100 MG: 100 CAPSULE ORAL at 16:36

## 2024-02-22 RX ADMIN — LEVOFLOXACIN 750 MG: 750 INJECTION, SOLUTION INTRAVENOUS at 11:49

## 2024-02-22 RX ADMIN — MIDAZOLAM 2 MG: 1 INJECTION INTRAMUSCULAR; INTRAVENOUS at 00:11

## 2024-02-22 RX ADMIN — GABAPENTIN 100 MG: 100 CAPSULE ORAL at 20:19

## 2024-02-22 RX ADMIN — ACETAMINOPHEN 1000 MG: 10 INJECTION INTRAVENOUS at 06:10

## 2024-02-22 RX ADMIN — GABAPENTIN 100 MG: 100 CAPSULE ORAL at 11:52

## 2024-02-22 RX ADMIN — HEPARIN SODIUM 5000 UNITS: 5000 INJECTION INTRAVENOUS; SUBCUTANEOUS at 14:01

## 2024-02-22 RX ADMIN — SODIUM CHLORIDE 4 UNITS/HR: 9 INJECTION, SOLUTION INTRAVENOUS at 11:35

## 2024-02-22 RX ADMIN — HEPARIN SODIUM 5000 UNITS: 5000 INJECTION INTRAVENOUS; SUBCUTANEOUS at 06:10

## 2024-02-22 RX ADMIN — METHYLPREDNISOLONE SODIUM SUCCINATE 125 MG: 125 INJECTION, POWDER, FOR SOLUTION INTRAMUSCULAR; INTRAVENOUS at 20:19

## 2024-02-22 NOTE — PROGRESS NOTES
[facility]  Progress Note: Critical Care  Name: Carla Hunt 59 y.o. female I MRN: [id number]  Unit/Bed#: MICU 10 I Date of Admission: 1/10/2024   Date of Service: 2/22/2024 I Hospital Day: 43    Assessment/Plan     Acute hypoxic respiratory failure due to severe covid/covid induced pneumonitis  Partial cecal volvulus s/p right hemicolectomy  Stenotrophomonas pneumonia  Steroid induced hyperglycemia  Acute kidney Injury, resolving  B cell lymphoma  History of small SAH  CKD stage III  Hypertriglyceridemia  Constipation  Sacral ulcer, unstageable  Seizure disorder  Anxiety     Neuro:   Sedation: Off propofol, On precedex drip 0.2, versed drip at 2, dilaudid drip at 1.  -Aim to maintain RASS 0 to -1, preferably -1 (drowsy) since patient has had difficulty tolerating vent. Currently likely at RASS -2.   -Repeat triglyceride level at 305 from 02/20  -Consider adding ketamine drip double concentrated, reducing or stopping dilaudid drip. Ketamine may help with increasing her Systolic Bps and therefore may aid with weaning off levophed as well.       Diagnosis: seizure disorder  -S/p partial left temporal lobe resection in 2007  -Contuinue home lamictal 150 bid and topamax 100 bid     Diagnosis: Anxiety  -On venlafaxine 25 mg TID via NGT  -Seroquel 12.5 hs and melatonin 6 hs for sleep     CV:   Diagnosis: Distributive shock  -On levo 2 to maintain MAPs > 65 mmHg     Pulm:  Diagnosis: Acute hypoxic respiratory failure due to severe covid  and covid induced fibrosis  -Tested COVID positive on 1/7  -Completed severe COVID pathway and 7 days CTX  -Tenuous respiratory status requiring multiple re-admissions to MICU  -S/p Bronch 2/2 and 02/16  -NG on cultures (initially showed non-group A strep)  -PCP and AFB negative   -Legionella, viral, fungal cultures no growth to date  -Pulse dose steroids with solumedrol 1g daily x3 days (completed 2/7) then transitioned to prednisone 80mg daily  on 2/8  -Off veletri  -Completed IVIG for 5 days  -CRP trend 221 (02/19) > 116.9 (02/20) > 94.2 (02/20) > 335.8 (02/21). Limited benefit in trending CRP at this time since it maybe elevated due to multiple other factors including recent volvulus and surgery. Will plan to continue with current solumedrol dosing and monitor patients respiratory status clinical and aim to wean vent FiO2 as tolerated  -Her elevated pCO2 today islikely related to reduced respiratory drive due to being on sedation with dilaudid and versed. Sedation changes as noted above.       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) though unclear how this was diagnosed, this also does not enirely fit as she is NOT hypokalemic  -Continue to monitor I/O and UOP     Diagnosis: Acute kidney injury, sCr at 0.99, resolved  Etiology: Was likely due to obstructive uropathy  -Monitor with daily BMP  -Nephrology input noted  -Avoid nephrotoxic medications and contrast studies if possible       F/E/N:   F: ON IVF with D5w with 0.45% NS  E: monitor and replete for goal K>4, P>3, Mg>2  N: NPO with low continuous suction     Heme/Onc:   Diagnosis: B cell lymphoma  -Follows with heme/onc at [facility]  -On rituxan for 2 year course, started May 2022  -Last dose was 12/20, treatment currently on hold   -Leukopenic on admission but WBC now wnl     DVT ppx with Heparin subcutaneous     Endo:   Diagnosis: steroid hyperglycemia and diabetes mellitus type 2, A1c at 6.6 from 01/2024  -Goal -180  -BG range last 24hrs 201-274  -Plan to  switch to insulin drip today from SSI since anticipating diet restart soon (possibly tomorrow after our discussion with surgery)       ID:   Diagnosis: Severe covid pneumonia and stenotrophomonas pneumonia  -Completed severe COVID pathway with decadron (ended 1/22) and remdesivir (ended 1/20), also completed 7 days CTX on 1/15  -C/f opportunistic infections given immunocompromised status on chemotherapy and recent steroid use  -No consolidations on CXR and procal negative  -S/p bactrim and minocycline x5 days for stenotrophomonas on sputum culture (1/25), then on bactrim for PJP ppx  -Bronc on 2/2 - Cx w/o growth (initially resulted as 1+ alpha hemolytic strep), AFB & PCP negative, f/u fungal, legionella, and viral cultures   -2/10 pt again had escalating O2 requirements on floors necessitating readmission to ICU  -Concern that with recent pulse dose steroids and now worsening respiratory status she could have infection, possibly opportunistic   -UA with moderate leukocytes, nitrite negative, 30-50 WBC, trace protein.  -Repeat sputum culture 2/9 with 4+ stenotrophomonas  -Bronch cultures with candida albicans, Rare gram positive cocci in pairs  -Was on high dose bactrim, received 10 days of this. Was also on vancomycin and cefepime. As per ID recommendations from 02/221, now off all three and switched to levaquin 750 mg IV ever 48 hours, Plan to continue for 5 days.  -As per ID recommendations, on remdesevir for 5 days  -On levaquin 750 mg day 4 of 5        MSK/Skin:   -Wound care team following for bilateral sacral wounds    Disposition: Critical care    ICU Core Measures     Vented Patient  VAP Bundle  VAP bundle ordered     A: Assess, Prevent, and Manage Pain Has pain been assessed? Yes  Need for changes to pain regimen? No   B: Both Spontaneous Awakening Trials (SATs) and Spontaneous Breathing Trials (SBTs) Plan to perform spontaneous awakening trial today? Modified and patient remained on precedex   Plan to  perform spontaneous breathing trial today? Modified and patient remained on precedex   Obvious barriers to extubation? Yes   C: Choice of Sedation RASS Goal: -1 Drowsy  Need for changes to sedation or analgesia regimen? Yes   D: Delirium CAM-ICU: Negative   E: Early Mobility  Plan for early mobility? Yes   F: Family Engagement Plan for family engagement today? Yes       Antibiotic Review: Patient on appropriate coverage based on culture data.     Review of Invasive Devices:    Ortiz Plan: Continue for accurate I/O monitoring for 48 hours  Central access plan: Medications requiring central line  Amelia Plan: Keep arterial line for frequent ABGs    Prophylaxis:  VTE VTE covered by:  heparin (porcine), Subcutaneous, 5,000 Units at 02/22/24 0610       Stress Ulcer  not ordered        Significant 24hr Events     24hr events: Tachycardic to 130s, also needed bear hugger. Heart rates eventually trended down to 100. Was on versed 2, dilaudid and precedex. ABG with pCO2 to 100, RR increased from 12 to 20, pressure increased from 8 to 10. ABG repeat ordered.      Subjective   Patient seen by bedside. Will discuss medical updates with patients family today.     Review of Systems   Unable to perform ROS: Patient nonverbal        Objective                            Vitals I/O      Most Recent Min/Max in 24hrs   Temp 99 °F (37.2 °C) Temp  Min: 96.3 °F (35.7 °C)  Max: 99.1 °F (37.3 °C)   Pulse (!) 124 Pulse  Min: 47  Max: 143   Resp 19 Resp  Min: 12  Max: 33   /54 BP  Min: 112/54  Max: 124/54   O2 Sat 93 % SpO2  Min: 91 %  Max: 100 %      Intake/Output Summary (Last 24 hours) at 2/22/2024 0747  Last data filed at 2/22/2024 0610  Gross per 24 hour   Intake 3623.11 ml   Output 2930 ml   Net 693.11 ml       Diet NPO    Invasive Monitoring   Arterial Line  [place] BP 91/51  Arterial Line BP  Min: 91/51  Max: 195/79   MAP 65 mmHg  Arterial Line MAP (mmHg)  Min: 63 mmHg  Max: 117 mmHg           Physical Exam   Physical Exam  Vitals  reviewed.   Eyes:      General: Lids are normal.   Skin:     General: Skin is warm.      Capillary Refill: Capillary refill takes less than 2 seconds.   HENT:      Head: Normocephalic.   Cardiovascular:      Rate and Rhythm: Regular rhythm. Tachycardia present.      Pulses: Normal pulses.   Abdominal: General: Bowel sounds are normal.      Palpations: Abdomen is soft.   Constitutional:       Appearance: She is ill-appearing.      Interventions: She is sedated and intubated.      Comments: Has ETT. NGT set to low suction, marie in place. Arterial line on left side, CVC in place. 1x PIV on right hand. Ostomy pouch with dark brown stool, site CDI.   Pulmonary:      Effort: Pulmonary effort is normal. She is intubated.      Comments: Coarse breath sounds, mechanically ventilated breath sounds  Neurological:      Mental Status: She is lethargic.      Comments: Pupillary reflex intact bilaterally. Appears sedated, unable to follow commands. Intermittently moving extremities spontaneously. Does not withdraw to noxious stimuli.    Genitourinary/Anorectal:  Marie present.          Diagnostic Studies      EKG: Reviewed  Imaging: Reviewed I have personally reviewed pertinent reports.       Medications:  Scheduled PRN   acetaminophen, 1,000 mg, Q8H SARTHAK  chlorhexidine, 15 mL, Q12H SARTHAK  heparin (porcine), 5,000 Units, Q8H SARTHAK  insulin lispro, 1-5 Units, Q6H SARTHAK  levofloxacin, 750 mg, Q48H  methylPREDNISolone sodium succinate, 125 mg, Q12H SARTHAK  midazolam, 4 mg, Once  nystatin, , BID  OLANZapine, 5 mg, HS  remdesivir, 100 mg, Q24H      HYDROmorphone, 0.5 mg, Q1H PRN  midazolam, 2 mg, Q4H PRN  ondansetron, 4 mg, Q6H PRN       Continuous    dexmedetomidine, 0.1-1.2 mcg/kg/hr, Last Rate: 0.2 mcg/kg/hr (02/21/24 2029)  dextrose 5 % and sodium chloride 0.45 %, 50 mL/hr, Last Rate: 50 mL/hr (02/22/24 0601)  HYDROmorphone, 1 mg/hr, Last Rate: 1 mg/hr (02/22/24 0610)  midazolam, 2 mg/hr, Last Rate: 2 mg/hr (02/22/24 0601)  norepinephrine,  1-30 mcg/min, Last Rate: 2 mcg/min (02/22/24 0610)         Labs:    CBC    Recent Labs     02/20/24  1759 02/21/24  0553 02/21/24  1533 02/21/24  2035 02/22/24  0436   WBC 11.89* 13.78*  --   --  23.58*   HGB 7.5* 7.2*   < > 10.7* 11.4*   HCT 22.7* 22.8*  --  33.6* 36.0    196  --   --  237   BANDSPCT 55* 28*  --   --   --     < > = values in this interval not displayed.     BMP    Recent Labs     02/22/24  0114 02/22/24  0436   SODIUM 147 146   K 4.5 5.0   * 115*   CO2 28 28   AGAP 5 3   BUN 46* 48*   CREATININE 0.78 0.99   CALCIUM 10.6* 10.3*       Coags    No recent results     Additional Electrolytes  Recent Labs     02/20/24  1523 02/20/24  1759 02/20/24  1759 02/21/24  0553 02/22/24  0436   MG  --  2.5   < > 2.1 2.0   PHOS  --  4.0  --   --   --    CAIONIZED 1.50* 1.37*  --   --   --     < > = values in this interval not displayed.          Blood Gas    Recent Labs     02/22/24  0618   PHART 7.060*   [id number] 95.8*   PO2ART 93.6   YCV1OKN 26.5   BEART -5.5   SOURCE Line, Arterial     Recent Labs     02/22/24  0618   SOURCE Line, Arterial    LFTs  Recent Labs     02/20/24  1010   ALT 18   AST 20   ALKPHOS 68   ALB 3.0*   TBILI 0.51       Infectious  No recent results  Glucose  Recent Labs     02/21/24  0403 02/21/24  1853 02/22/24  0114 02/22/24  0436   GLUC 195* 266* 201* 249*             Miguel Whittaker MD

## 2024-02-22 NOTE — PROGRESS NOTES
[facility]  Progress Note  Name: Carla Hunt I  MRN: [id number]  Unit/Bed#: MICU 10 I Date of Admission: 1/10/2024   Date of Service: 2/22/2024 I Hospital Day: 43    Assessment/Plan   Goals of care, counseling/discussion  Assessment & Plan  Plan for family meeting on Monday 2/26 at 3:30pm to provide an update and discuss goals of care.   Spouse, Min, has previously expressed that Carla would not wish to proceed with trach/PEG if unable to liberate from ventilator.    Palliative care patient  Assessment & Plan  PSC, including MSW support, will follow closely to continue to provide supportive care as clinical situation evolves.   Decisional apparatus:  Patient is not competent on my exam today.  If competence is lost, patient's substitute decision maker would default to spouse by [place] Act 169.  Advance Directive / Living Will / POLST:  None on file, unable to complete         Interval history:       Patient with tachycardia to 130s, dyssynchrony with vent requiring increase in sedation. Patient's spouse at bedside. He expresses appreciation for the care she is receiving. He remains hopeful for slow and steady recovery but also nuances the possibility that Carla may not survive this hospitalization. He is open to family meeting scheduled for 3:30pm Monday when his daughter can accompany him.    MEDICATIONS / ALLERGIES:     all current active meds have been reviewed    No Known Allergies    OBJECTIVE:    Physical Exam  Physical Exam  Cardiovascular:      Rate and Rhythm: Tachycardia present.   Pulmonary:      Comments: Intubated via ETT  Abdominal:      Comments: Tube feeds running via NGT. Ostomy in place   Genitourinary:     Comments: Urinary catheter in place  Skin:     General: Skin is dry.   Neurological:      Comments: sedated   Psychiatric:      Comments: calm         Lab Results:   Results from last 7 days   Lab Units 02/22/24  0436 02/21/24  2035 02/21/24  1533  02/21/24  0553 02/20/24  1759 02/18/24  0537 02/17/24  0511   WBC Thousand/uL 23.58*  --   --  13.78* 11.89*   < > 22.93*   HEMOGLOBIN g/dL 11.4* 10.7* 5.8* 7.2* 7.5*   < > 9.5*   I STAT HEMOGLOBIN   --   --   --   --   --    < >  --    HEMATOCRIT % 36.0 33.6*  --  22.8* 22.7*   < > 28.3*   HEMATOCRIT, ISTAT   --   --   --   --   --    < >  --    PLATELETS Thousands/uL 237  --   --  196 224   < > 268   MONO PCT %  --   --   --  0* 0*  --  1*   EOS PCT %  --   --   --  0 0  --  0    < > = values in this interval not displayed.     Results from last 7 days   Lab Units 02/22/24  0831 02/22/24  0436 02/22/24  0114 02/20/24  1759 02/20/24  1523 02/20/24  1515 02/20/24  1412 02/20/24  1412 02/20/24  1010 02/17/24  0511 02/16/24  1833   POTASSIUM mmol/L 5.3 5.0 4.5   < >  --   --   --   --  6.1*   < >  --    CHLORIDE mmol/L 115* 115* 114*   < >  --   --   --   --  113*   < >  --    CO2 mmol/L 26 28 28   < >  --   --   --   --  27   < >  --    CO2, I-STAT mmol/L  --   --   --   --  23 24   < > 31  --   --   --    BUN mg/dL 50* 48* 46*   < >  --   --   --   --  95*   < >  --    CREATININE mg/dL 1.04 0.99 0.78   < >  --   --   --   --  1.62*   < >  --    CALCIUM mg/dL 10.1 10.3* 10.6*   < >  --   --   --   --  11.8*   < >  --    ALK PHOS U/L  --   --   --   --   --   --   --   --  68  --  72   ALT U/L  --   --   --   --   --   --   --   --  18  --  24   AST U/L  --   --   --   --   --   --   --   --  20  --  17   GLUCOSE, ISTAT mg/dl  --   --   --   --  195* 209*  --  128  --   --   --     < > = values in this interval not displayed.       Imaging Studies: reviewed pertinent studies   EKG, Pathology, and Other Studies: reviewed pertinent studies    Counseling / Coordination of Care    Total floor / unit time spent today 25 minutes. Greater than 50% of total time was spent with the patient and / or family counseling and / or coordination of care. A description of the counseling / coordination of care: time spent providing  supportive listening to patient's , arranging family meeting, coordinating care with primary team.

## 2024-02-22 NOTE — PROGRESS NOTES
"Progress Note - Carla Hunt 59 y.o. female MRN: [id number]    Unit/Bed#: MICU 10 Encounter: [id number]      Assessment:  Carla Hunt is a 59 y.o. female with COVID/strep pna, B cell lymphoma on rituxumab, CKD; hospitalization c/b cecal volvulus s/p OR for exlap ileocectomy end ileostomy w/mucus fistula 2/20    S/p2u prbc for hgb 5.8 responded to 10.7, to 11.4 this morning  WBC 23 from 13.7  pH 7.01 BD -7  sIMV 20 / pC 10 PEEP 10 FIO2 100  Levo 2  S/p ICU bronch yesterday  Sinus tach new    Plan:  Monitor ostomy output and character  Wean pressors as able  Maintain NGT for decompression  Rest of care per primary    Subjective:   Patient seen and examined bedside.  Intubated and sedated.    Objective:     Vitals: Blood pressure 121/54, pulse (!) 124, temperature 99 °F (37.2 °C), resp. rate 19, height 5' 8\" (1.727 m), weight 73.9 kg (162 lb 14.7 oz), SpO2 93%.,Body mass index is 24.77 kg/m².      Intake/Output Summary (Last 24 hours) at 2/22/2024 0639  Last data filed at 2/22/2024 0610  Gross per 24 hour   Intake 3623.11 ml   Output 2930 ml   Net 693.11 ml       Physical Exam:   General - no acute distress, intubated  CV - warm, tachycardia  Pulm - no respiratory distress on vent  Abd - soft, nondistended, ileostomy viable with stool per bag, midline clean and stapled  Neuro - sedated         Invasive Devices       Central Venous Catheter Line  Duration             CVC Central Lines 02/20/24 1 day              Peripheral Intravenous Line  Duration             Peripheral IV 02/18/24 Right;Ventral (anterior) Forearm 3 days              Arterial Line  Duration             Arterial Line 02/14/24 Radial 8 days              Drain  Duration             Ileostomy RUQ 1 day    NG/OG/Enteral Tube Nasogastric 18 Fr Left nare 1 day    Urethral Catheter Latex 16 Fr. 1 day              Airway  Duration             ETT  Cuffed 7.5 mm 8 days                    Lab, Imaging and other studies: I have personally reviewed " pertinent reports.    VTE Pharmacologic Prophylaxis: Heparin  VTE Mechanical Prophylaxis: sequential compression device

## 2024-02-22 NOTE — ASSESSMENT & PLAN NOTE
Plan for family meeting on Monday 2/26 at 3:30pm to provide an update and discuss goals of care.   Spouse, Min, has previously expressed that Carla would not wish to proceed with trach/PEG if unable to liberate from ventilator.

## 2024-02-22 NOTE — PROGRESS NOTES
Progress Note - Infectious Disease   Carla Hunt 59 y.o. female MRN: [id number]  [facility] Encounter: [id number]      Impression/Recommendations:  Possible bacterial pneumonia.  Initially, on admission early January, secondary to mostly COVID but there was concern for concurrent bacterial pneumonia on admission due to elevated procalcitonin. Patient completed treatment course for both COVID and bacterial pneumonia.  She was clinically improved with decreasing O2 requirement.  However, patient deteriorated in late January, requiring to be placed back on high flow O2 support.  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  Procalcitonin x 3 were in the normal range.  Patient has no fever or leukocytosis.  She improved with increasing steroid dose.  She completed a 5-day course of Bactrim/minocycline for presumptive stenotrophomonas respiratory infection, with expectorated sputum growing stenotrophomonas.  She subsequently had bronchoscopy, with BAL culture negative for bacterial growth, AFB and PCP but completed the course of Bactrim/minocycline prior to bronchoscopy.  Patient improved and O2 was being weaned again until 2 weeks ago, when she deteriorated again.  She is now back in ICU.  Expectorated sputum once again is growing stenotrophomonas.  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  Not sure that the stenotrophomonas that is growing again and expectorated sputum is pathogenic versus upper airway colonization.  Patient was restarted on high-dose steroid and Bactrim.  Patient's respiratory status remained tenuous throughout, required intubation subsequently.  She had bronchoscopy on 2/16 with moderate secretion.  BAL culture had no growth but COVID PCR was positive.  Bactrim was changed to Levaquin over weekend due to ELIESER.  Remdesivir was restarted for possible COVID relapse.  Due to worsening consolidations on CT,  patient is status post repeat bronchoscopy on 2/21, with some purulent secretion.  Will continue Levaquin pending findings and culture from bronchoscopy.  Continue Levaquin.  Treat x 14 days total antibiotic this time around.  Continue systemic corticosteroid, with weaning, per critical care service  Monitor temperature/WBC.  Follow-up in BAL culture.  May need to modify antibiotic regimen per culture.     Severe COVID, present on admission.  Patient was treated with remdesivir, dexamethasone and was given 1 dose of Tocilizumab on admission.  She also had a course of antibiotic initially for presumptive bacterial superinfection.  COVID antigen was negative prior to coming off isolation.  Current chest CT is showing extensive fibrotic changes.  Persistently positive COVID PCR in BAL noted.  This may be all prolonged shedding.  However, given immunosuppressed state and lack of response to antibiotic and high-dose steroid thus far, will treat patient with another course of remdesivir.  Completed another course of remdesivir x 5-10 days.     Acute hypoxic respiratory failure, likely multifactorial, with both COVID and bacterial ammonia contributing, with patient back on ventilator last week.  She remains on ventilator.  Ventilator support and weaning per critical care medicine service.    CKD.  Creatinine worsened on Bactrim.  Patient is now off Bactrim.  Creatinine has improved.  Monitor creatinine.     5. 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  Patient is status post exploratory laparotomy with ileocecectomy and end ileostomy creation.  Surgery follow-up.     B-cell lymphoma, on chemotherapy, which is currently postponed.  Patient was leukopenic on admission but resolved.  Monitor WBC/ANC.     Discussed with Dr. Cardenas from critical care medicine service regarding continuation of Levaquin and remdesivir, and adjust antibiotic course as needed per BAL culture from recent bronchoscopy.  He is in agreement.        Antibiotics:  Levaquin # 4  Antibiotic # 13     Subjective:  Patient remains intubated.  She remains sedated and minimally responsive  Temperature stays down.  No chills.  She is tolerating antibiotic well.  No nausea, vomiting or diarrhea.       Objective:  Vitals:  Temp:  [96.3 °F (35.7 °C)-99 °F (37.2 °C)] 98.4 °F (36.9 °C)  HR:  [] 93  Resp:  [12-24] 24  BP: ()/(51-64) 106/59  SpO2:  [92 %-100 %] 95 %  Temp (24hrs), Av.6 °F (36.4 °C), Min:96.3 °F (35.7 °C), Max:99 °F (37.2 °C)  Current: Temperature: 98.4 °F (36.9 °C)    Physical Exam:     General: Awake, alert, cooperative, no distress.   Neck:  Supple. No mass.  No lymphadenopathy.   Lungs: Expansion symmetric, no rales, no wheezing, respirations unlabored.   Heart:  Regular rate and rhythm, S1 and S2 normal, no murmur.   Abdomen: Soft, nondistended, non-tender, bowel sounds active all four quadrants, no masses, no organomegaly.   Extremities: No edema. No erythema/warmth. No ulcer. Nontender to palpation.   Skin:  No rash.   Neuro: Moves all extremities.     Invasive Devices       Central Venous Catheter Line  Duration             CVC Central Lines 24 1 day              Peripheral Intravenous Line  Duration             Peripheral IV 24 Right;Ventral (anterior) Forearm 3 days              Arterial Line  Duration             Arterial Line 24 Radial 8 days              Drain  Duration             Urethral Catheter Latex 16 Fr. 2 days    Ileostomy RUQ 1 day    NG/OG/Enteral Tube Nasogastric 18 Fr Left nare 1 day              Airway  Duration             ETT  Cuffed 7.5 mm 8 days                    Labs studies:   I have personally reviewed pertinent labs.  Results from last 7 days   Lab Units 24  0831 24  0436 24  0114 24  1759 24  1523 24  1515 24  1412 24  1412 24  1010 24  0511 24  1833   POTASSIUM mmol/L 5.3 5.0 4.5   < >  --   --   --   --  6.1*   < >  --     CHLORIDE mmol/L 115* 115* 114*   < >  --   --   --   --  113*   < >  --    CO2 mmol/L 26 28 28   < >  --   --   --   --  27   < >  --    CO2, I-STAT mmol/L  --   --   --   --  23 24   < > 31  --   --   --    BUN mg/dL 50* 48* 46*   < >  --   --   --   --  95*   < >  --    CREATININE mg/dL 1.04 0.99 0.78   < >  --   --   --   --  1.62*   < >  --    EGFR ml/min/1.73sq m 59 63 84   < >  --   --   --   --  34   < >  --    GLUCOSE, ISTAT mg/dl  --   --   --   --  195* 209*  --  128  --   --   --    CALCIUM mg/dL 10.1 10.3* 10.6*   < >  --   --   --   --  11.8*   < >  --    AST U/L  --   --   --   --   --   --   --   --  20  --  17   ALT U/L  --   --   --   --   --   --   --   --  18  --  24   ALK PHOS U/L  --   --   --   --   --   --   --   --  68  --  72    < > = values in this interval not displayed.     Results from last 7 days   Lab Units 02/22/24  0436 02/21/24  2035 02/21/24  1533 02/21/24  0553 02/20/24  1759   WBC Thousand/uL 23.58*  --   --  13.78* 11.89*   HEMOGLOBIN g/dL 11.4* 10.7* 5.8* 7.2* 7.5*   PLATELETS Thousands/uL 237  --   --  196 224     Results from last 7 days   Lab Units 02/21/24  1713 02/21/24  1710 02/21/24  1704 02/18/24  0926 02/17/24  1818 02/16/24  1214 02/16/24  1209   BLOOD CULTURE   --   --   --   --  No Growth After 4 Days.  No Growth After 4 Days.  --   --    GRAM STAIN RESULT  2+ Polys*  1+ Yeast* Rare Polys  No bacteria seen Rare Polys  No bacteria seen  --   --  No polys seen*  Rare Gram positive cocci in pairs* No Polys or Bacteria seen   MRSA CULTURE ONLY   --   --   --  No Methicillin Resistant Staphlyococcus aureus (MRSA) isolated  --   --   --        Imaging Studies:   I have personally reviewed pertinent imaging study reports and images in PACS.    EKG, Pathology, and Other Studies:   I have personally reviewed pertinent reports.

## 2024-02-22 NOTE — PLAN OF CARE
Problem: Prexisting or High Potential for Compromised Skin Integrity  Goal: Skin integrity is maintained or improved  Description: INTERVENTIONS:  - Identify patients at risk for skin breakdown  - Assess and monitor skin integrity  - Assess and monitor nutrition and hydration status  - Monitor labs   - Assess for incontinence   - Turn and reposition patient  - Assist with mobility/ambulation  - Relieve pressure over bony prominences  - Avoid friction and shearing  - Provide appropriate hygiene as needed including keeping skin clean and dry  - Evaluate need for skin moisturizer/barrier cream  - Collaborate with interdisciplinary team   - Patient/family teaching  - Consider wound care consult   2/22/2024 1829 by Lilly Du RN  Outcome: Progressing  2/22/2024 1829 by Lilly Du RN  Outcome: Progressing

## 2024-02-22 NOTE — SOCIAL WORK
"The [facility]\"" SW met with the pt  outside the room.  The pt was being cleaned up.  The pt  states his son's birthday was, \"rough.\" The entire family is concerned about the pt.  She remains very sedated and is still unaware of the ostomy bag.  The pt  states she will need to be okay with it.  He states he would prefer her have the bag than allow her to pass away.  The pt daughter wedding is in July and her bridal shower is in April.  He remains hopeful the pt will be well enough to attend.  He expressed concern about the pt requiring O2 at 100%.  He reports they are trying to reduce it to 90%.    The pt  indicated he was not sleeping fearing something may happen during the evening. He states he is very pleased with her care, but his love and concern for her makes it very challenging for him to adequately rest.    He states his daughter will be bringing his son and grandchild to the hospital about 4pm today, because his grandson has not met his [id number].  The SW will try to be present to meet the pt dtr.    I have spent 20 minutes with Family today in which greater than 50% of this time was spent in counseling/coordination of care     Palliative  will follow-up as requested by patient, family, and primary team.  Please contact with any specific requests    "

## 2024-02-22 NOTE — PROGRESS NOTES
Nutrition Follow-Up/Recommendations:    When medically able to advance TF, would recommend goal rate of 60mL/hr x24 hrs/day + add one packet prosource liquid protein daily.   This would provide a total of 1788 kcals, 101g protein, 1219mL water, 164g CHO.     Noted potassium at high end of normal. If becomes elevated, consider switching to Nepro formula, goal rate of 40mL/hr continuous + add one packet prosource liquid protein daily.   This would provide a total of 1788 kcals, 93g protein, 758mL water, 154g CHO.    Additional free water flushes per critical care and/or surgery team.

## 2024-02-22 NOTE — RESPIRATORY THERAPY NOTE
RT Ventilator Management Note  Carla Hunt 59 y.o. female MRN: [id number]  Unit/Bed#: [facility] Encounter: [id number]      Daily Screen         2/21/2024 0828 2/21/2024 1948          Patient safety screen outcome:: Failed Failed      Not Ready for Weaning due to:: Underline problem not resolved;PEEP > 8cmH2O;FiO2 >60% Underline problem not resolved                Physical Exam:   Assessment Type: Assess only  General Appearance: Sedated  Respiratory Pattern: (P) Assisted  Chest Assessment: (P) Chest expansion symmetrical  Bilateral Breath Sounds: (P) Diminished, Coarse  Cough: (P) Productive  Suction: ET Tube  O2 Device: G5      Resp Comments: (P) Pt is stable on pressure control simv settings. Will continue to moniter pt per protocol

## 2024-02-22 NOTE — ASSESSMENT & PLAN NOTE
PSC, including MSW support, will follow closely to continue to provide supportive care as clinical situation evolves.   Decisional apparatus:  Patient is not competent on my exam today.  If competence is lost, patient's substitute decision maker would default to spouse by [place] 169.  Advance Directive / Living Will / POLST:  None on file, unable to complete

## 2024-02-22 NOTE — PHYSICAL THERAPY NOTE
Physical Therapy Cancellation Note         02/22/24 0740   PT Last Visit   PT Visit Date 02/22/24   Note Type   Note Type Cancelled Session   Cancel Reasons Intubated/sedated  (Patient remains intubated. She has been cancelled by PT since 2/14. PT will sign off at this time- please re-consult when patient is medically appropriate to participate in skilled PT services.)       DOREEN THOMAS PT, DPT

## 2024-02-22 NOTE — PLAN OF CARE
Problem: Prexisting or High Potential for Compromised Skin Integrity  Goal: Skin integrity is maintained or improved  Description: INTERVENTIONS:  - Identify patients at risk for skin breakdown  - Assess and monitor skin integrity  - Assess and monitor nutrition and hydration status  - Monitor labs   - Assess for incontinence   - Turn and reposition patient  - Assist with mobility/ambulation  - Relieve pressure over bony prominences  - Avoid friction and shearing  - Provide appropriate hygiene as needed including keeping skin clean and dry  - Evaluate need for skin moisturizer/barrier cream  - Collaborate with interdisciplinary team   - Patient/family teaching  - Consider wound care consult   Outcome: Progressing     Problem: INFECTION - ADULT  Goal: Absence or prevention of progression during hospitalization  Description: INTERVENTIONS:  - Assess and monitor for signs and symptoms of infection  - Monitor lab/diagnostic results  - Monitor all insertion sites, i.e. indwelling lines, tubes, and drains  - Monitor endotracheal if appropriate and nasal secretions for changes in amount and color  - [place] appropriate cooling/warming therapies per order  - Administer medications as ordered  - Instruct and encourage patient and family to use good hand hygiene technique  - Identify and instruct in appropriate isolation precautions for identified infection/condition  Outcome: Progressing     Problem: SAFETY ADULT  Goal: Patient will remain free of falls  Description: INTERVENTIONS:  - Educate patient/family on patient safety including physical limitations  - Instruct patient to call for assistance with activity   - Consult OT/PT to assist with strengthening/mobility   - Keep Call bell within reach  - Keep bed low and locked with side rails adjusted as appropriate  - Keep care items and personal belongings within reach  - Initiate and maintain comfort rounds  - Make Fall Risk Sign visible to staff  - Offer Toileting every  2 Hours, in advance of need  - Initiate/Maintain bed alarm  - Obtain necessary fall risk management equipment: non skid footwear  - Apply yellow socks and bracelet for high fall risk patients  - Consider moving patient to room near nurses station  Outcome: Progressing     Problem: RESPIRATORY - ADULT  Goal: Achieves optimal ventilation and oxygenation  Description: INTERVENTIONS:  - Assess for changes in respiratory status  - Assess for changes in mentation and behavior  - Position to facilitate oxygenation and minimize respiratory effort  - Oxygen administered by appropriate delivery if ordered  - Initiate smoking cessation education as indicated  - Encourage broncho-pulmonary hygiene including cough, deep breathe, Incentive Spirometry  - Assess the need for suctioning and aspirate as needed  - Assess and instruct to report SOB or any respiratory difficulty  - Respiratory Therapy support as indicated  Outcome: Progressing     Problem: SKIN/TISSUE INTEGRITY - ADULT  Goal: Skin Integrity remains intact(Skin Breakdown Prevention)  Description: Assess:  -Perform Flavio assessment every shift   -Clean and moisturize skin every day   -Inspect skin when repositioning, toileting, and assisting with ADLS  -Assess under medical devices such as ronny  every hour   -Assess extremities for adequate circulation and sensation     Bed Management:  -Have minimal linens on bed & keep smooth, unwrinkled  -Change linens as needed when moist or perspiring  -Avoid sitting or lying in one position for more than 2 hours while in bed  -Keep HOB at 45 degrees     Toileting:  -Offer bedside commode  -Assess for incontinence every hour  -Use incontinent care products after each incontinent episode such as moisture barrier cream     Activity:  -Mobilize patient 3 times a day  -Encourage activity and walks on unit  -Encourage or provide ROM exercises   -Turn and reposition patient every 2 Hours  -Use appropriate equipment to lift or move  patient in bed  -Instruct/ Assist with weight shifting every hour when out of bed in chair  -Consider limitation of chair time 2 hour intervals    Skin Care:  -Avoid use of baby powder, tape, friction and shearing, hot water or constrictive clothing  -Relieve pressure over bony prominences using allevyn   -Do not massage red bony areas    Next Steps:  -Teach patient strategies to minimize risks such as weight shifting    -Consider consults to  interdisciplinary teams such as PT  Outcome: Progressing  Goal: Incision(s), wounds(s) or drain site(s) healing without S/S of infection  Description: INTERVENTIONS  - Assess and document dressing, incision, wound bed, drain sites and surrounding tissue  - Provide patient and family education  - Perform skin care/dressing changes every shift  Outcome: Progressing  Goal: Pressure injury heals and does not worsen  Description: Interventions:  - Implement low air loss mattress or specialty surface (Criteria met)  - Apply silicone foam dressing  - Instruct/assist with weight shifting every 30 minutes when in chair   - Limit chair time to 3 hour intervals  - Use special pressure reducing interventions when in chair   - Apply fecal or urinary incontinence containment device   - Perform passive or active ROM every 2 hours  - Turn and reposition patient & offload bony prominences every 2 hours   - Utilize friction reducing device or surface for transfers   - Consider consults to  interdisciplinary teams   - Use incontinent care products after each incontinent episode   - Consider nutrition services referral as needed  Outcome: Progressing     Problem: Potential for Falls  Goal: Patient will remain free of falls  Description: INTERVENTIONS:  - Educate patient/family on patient safety including physical limitations  - Instruct patient to call for assistance with activity   - Consult OT/PT to assist with strengthening/mobility   - Keep Call bell within reach  - Keep bed low and locked  with side rails adjusted as appropriate  - Keep care items and personal belongings within reach  - Initiate and maintain comfort rounds  - Make Fall Risk Sign visible to staff  - Offer Toileting every 2 Hours, in advance of need  - Initiate/Maintain bed alarm  - Obtain necessary fall risk management equipment: non skid footwear  - Apply yellow socks and bracelet for high fall risk patients  - Consider moving patient to room near nurses station  Outcome: Progressing     Problem: Nutrition/Hydration-ADULT  Goal: Nutrient/Hydration intake appropriate for improving, restoring or maintaining nutritional needs  Description: Monitor and assess patient's nutrition/hydration status for malnutrition. Collaborate with interdisciplinary team and initiate plan and interventions as ordered.  Monitor patient's weight and dietary intake as ordered or per policy. Utilize nutrition screening tool and intervene as necessary. Determine patient's food preferences and provide high-protein, high-caloric foods as appropriate.     INTERVENTIONS:  - Monitor oral intake, urinary output, labs, and treatment plans  - Assess nutrition and hydration status and recommend course of action  - Evaluate amount of meals eaten  - Assist patient with eating if necessary   - Allow adequate time for meals  - Recommend/ encourage appropriate diets, oral nutritional supplements, and vitamin/mineral supplements  - Order, calculate, and assess calorie counts as needed  - Recommend, monitor, and adjust tube feedings and TPN/PPN based on assessed needs  - Assess need for intravenous fluids  - Provide specific nutrition/hydration education as appropriate  - Include patient/family/caregiver in decisions related to nutrition  Outcome: Progressing     Problem: SAFETY,RESTRAINT: NV/NON-SELF DESTRUCTIVE BEHAVIOR  Goal: Remains free of harm/injury (restraint for non violent/non self-detsructive behavior)  Description: INTERVENTIONS:  - Instruct patient/family regarding  restraint use   - Assess and monitor physiologic and psychological status   - Provide interventions and comfort measures to meet assessed patient needs   - Identify and implement measures to help patient regain control  - Assess readiness for release of restraint   Outcome: Progressing  Goal: Returns to optimal restraint-free functioning  Description: INTERVENTIONS:  - Assess the patient's behavior and symptoms that indicate continued need for restraint  - Identify and implement measures to help patient regain control  - Assess readiness for release of restraint   Outcome: Progressing     Problem: COPING  Goal: Pt/Family able to verbalize concerns and demonstrate effective coping strategies  Description: INTERVENTIONS:  - Assist patient/family to identify coping skills, available support systems and cultural and spiritual values  - Provide emotional support, including active listening and acknowledgement of concerns of patient and caregivers  - Reduce environmental stimuli, as able  - Provide patient education  - Assess for spiritual pain/suffering and initiate spiritual care, including notification of Pastoral Care or [place] based community as needed  - Assess effectiveness of coping strategies  Outcome: Progressing  Goal: Will report anxiety at manageable levels  Description: INTERVENTIONS:  - Administer medication as ordered  - Teach and encourage coping skills  - Provide emotional support  - Assess patient/family for anxiety and ability to cope  Outcome: Progressing     Problem: BEHAVIOR  Goal: Pt/Family maintain appropriate behavior and adhere to behavioral management agreement, if implemented  Description: INTERVENTIONS:  - Assess the family dynamic   - Encourage verbalization of thoughts and concerns in a socially appropriate manner  - Assess patient/family's coping skills and non-compliant behavior (including use of illegal substances).  - Utilize positive, consistent limit setting strategies supporting  safety of patient, staff and others  - Initiate consult with Case Management, Spiritual Care or other ancillary services as appropriate  - If a patient's/visitor's behavior jeopardizes the safety of the patient, staff, or others, refer to organization procedure.   - Notify Security of behavior or suspected illegal substances which indicate the need for search of the patient and/or belongings  - Encourage participation in the decision making process about a behavioral management agreement; implement if patient meets criteria  Outcome: Progressing     Problem: NEUROSENSORY - ADULT  Goal: Achieves stable or improved neurological status  Description: INTERVENTIONS  - Monitor and report changes in neurological status  - Monitor vital signs such as temperature, blood pressure, glucose, and any other labs ordered   - Initiate measures to prevent increased intracranial pressure  - Monitor for seizure activity and implement precautions if appropriate      Outcome: Progressing  Goal: Achieves maximal functionality and self care  Description: INTERVENTIONS  - Monitor swallowing and airway patency with patient fatigue and changes in neurological status  - Encourage and assist patient to increase activity and self care.   - Encourage visually impaired, hearing impaired and aphasic patients to use assistive/communication devices  Outcome: Progressing     Problem: CARDIOVASCULAR - ADULT  Goal: Maintains optimal cardiac output and hemodynamic stability  Description: INTERVENTIONS:  - Monitor I/O, vital signs and rhythm  - Monitor for S/S and trends of decreased cardiac output  - Administer and titrate ordered vasoactive medications to optimize hemodynamic stability  - Assess quality of pulses, skin color and temperature  - Assess for signs of decreased coronary artery perfusion  - Instruct patient to report change in severity of symptoms  Outcome: Progressing  Goal: Absence of cardiac dysrhythmias or at baseline  rhythm  Description: INTERVENTIONS:  - Continuous cardiac monitoring, vital signs, obtain 12 lead EKG if ordered  - Administer antiarrhythmic and heart rate control medications as ordered  - Monitor electrolytes and administer replacement therapy as ordered  Outcome: Progressing     Problem: GASTROINTESTINAL - ADULT  Goal: Minimal or absence of nausea and/or vomiting  Description: INTERVENTIONS:  - Administer IV fluids if ordered to ensure adequate hydration  - Maintain NPO status until nausea and vomiting are resolved  - Nasogastric tube if ordered  - Administer ordered antiemetic medications as needed  - Provide nonpharmacologic comfort measures as appropriate  - Advance diet as tolerated, if ordered  - Consider nutrition services referral to assist patient with adequate nutrition and appropriate food choices  Outcome: Progressing  Goal: Maintains or returns to baseline bowel function  Description: INTERVENTIONS:  - Assess bowel function  - Encourage oral fluids to ensure adequate hydration  - Administer IV fluids if ordered to ensure adequate hydration  - Administer ordered medications as needed  - Encourage mobilization and activity  - Consider nutritional services referral to assist patient with adequate nutrition and appropriate food choices  Outcome: Progressing  Goal: Maintains adequate nutritional intake  Description: INTERVENTIONS:  - Monitor percentage of each meal consumed  - Identify factors contributing to decreased intake, treat as appropriate  - Assist with meals as needed  - Monitor I&O, weight, and lab values if indicated  - Obtain nutrition services referral as needed  Outcome: Progressing  Goal: Establish and maintain optimal ostomy function  Description: INTERVENTIONS:  - Assess bowel function  - Encourage oral fluids to ensure adequate hydration  - Administer IV fluids if ordered to ensure adequate hydration   - Administer ordered medications as needed  - Encourage mobilization and activity  -  Nutrition services referral to assist patient with appropriate food choices  - Assess stoma site  - Consider wound care consult   Outcome: Progressing  Goal: Oral mucous membranes remain intact  Description: INTERVENTIONS  - Assess oral mucosa and hygiene practices  - Implement preventative oral hygiene regimen  - Implement oral medicated treatments as ordered  - Initiate Nutrition services referral as needed  Outcome: Progressing     Problem: GENITOURINARY - ADULT  Goal: Maintains or returns to baseline urinary function  Description: INTERVENTIONS:  - Assess urinary function  - Encourage oral fluids to ensure adequate hydration if ordered  - Administer IV fluids as ordered to ensure adequate hydration  - Administer ordered medications as needed  - Offer frequent toileting  - Follow urinary retention protocol if ordered  Outcome: Progressing  Goal: Urinary catheter remains patent  Description: INTERVENTIONS:  - Assess patency of urinary catheter  - If patient has a chronic marie, consider changing catheter if non-functioning  - Follow guidelines for intermittent irrigation of non-functioning urinary catheter  Outcome: Progressing     Problem: METABOLIC, FLUID AND ELECTROLYTES - ADULT  Goal: Electrolytes maintained within normal limits  Description: INTERVENTIONS:  - Monitor labs and assess patient for signs and symptoms of electrolyte imbalances  - Administer electrolyte replacement as ordered  - Monitor response to electrolyte replacements, including repeat lab results as appropriate  - Instruct patient on fluid and nutrition as appropriate  Outcome: Progressing  Goal: Fluid balance maintained  Description: INTERVENTIONS:  - Monitor labs   - Monitor I/O and WT  - Instruct patient on fluid and nutrition as appropriate  - Assess for signs & symptoms of volume excess or deficit  Outcome: Progressing  Goal: Glucose maintained within target range  Description: INTERVENTIONS:  - Monitor Blood Glucose as ordered  - Assess  for signs and symptoms of hyperglycemia and hypoglycemia  - Administer ordered medications to maintain glucose within target range  - Assess nutritional intake and initiate nutrition service referral as needed  Outcome: Progressing     Problem: HEMATOLOGIC - ADULT  Goal: Maintains hematologic stability  Description: INTERVENTIONS  - Assess for signs and symptoms of bleeding or hemorrhage  - Monitor labs  - Administer supportive blood products/factors as ordered and appropriate  Outcome: Progressing     Problem: MUSCULOSKELETAL - ADULT  Goal: Maintain or return mobility to safest level of function  Description: INTERVENTIONS:  - Assess patient's ability to carry out ADLs; assess patient's baseline for ADL function and identify physical deficits which impact ability to perform ADLs (bathing, care of mouth/teeth, toileting, grooming, dressing, etc.)  - Assess/evaluate cause of self-care deficits   - Assess range of motion  - Assess patient's mobility  - Assess patient's need for assistive devices and provide as appropriate  - Encourage maximum independence but intervene and supervise when necessary  - Involve family in performance of ADLs  - Assess for home care needs following discharge   - Consider OT consult to assist with ADL evaluation and planning for discharge  - Provide patient education as appropriate  Outcome: Progressing

## 2024-02-23 ENCOUNTER — APPOINTMENT (INPATIENT)
Dept: RADIOLOGY | Facility: HOSPITAL | Age: 59
DRG: 003 | End: 2024-02-23
Payer: COMMERCIAL

## 2024-02-23 LAB
ANION GAP SERPL CALCULATED.3IONS-SCNC: 6 MMOL/L
ANION GAP SERPL CALCULATED.3IONS-SCNC: 7 MMOL/L
ANION GAP SERPL CALCULATED.3IONS-SCNC: 7 MMOL/L
ANION GAP SERPL CALCULATED.3IONS-SCNC: 9 MMOL/L
ATRIAL RATE: 71 BPM
BACTERIA BLD CULT: NORMAL
BACTERIA BLD CULT: NORMAL
BACTERIA BRONCH AEROBE CULT: ABNORMAL
BASE EXCESS BLDA CALC-SCNC: -0.1 MMOL/L
BUN SERPL-MCNC: 44 MG/DL (ref 5–25)
BUN SERPL-MCNC: 46 MG/DL (ref 5–25)
BUN SERPL-MCNC: 47 MG/DL (ref 5–25)
BUN SERPL-MCNC: 49 MG/DL (ref 5–25)
CALCIUM SERPL-MCNC: 10 MG/DL (ref 8.4–10.2)
CALCIUM SERPL-MCNC: 10.3 MG/DL (ref 8.4–10.2)
CHLORIDE SERPL-SCNC: 108 MMOL/L (ref 96–108)
CHLORIDE SERPL-SCNC: 111 MMOL/L (ref 96–108)
CHLORIDE SERPL-SCNC: 115 MMOL/L (ref 96–108)
CHLORIDE SERPL-SCNC: 115 MMOL/L (ref 96–108)
CO2 SERPL-SCNC: 27 MMOL/L (ref 21–32)
CO2 SERPL-SCNC: 27 MMOL/L (ref 21–32)
CO2 SERPL-SCNC: 28 MMOL/L (ref 21–32)
CO2 SERPL-SCNC: 29 MMOL/L (ref 21–32)
CREAT SERPL-MCNC: 0.73 MG/DL (ref 0.6–1.3)
CREAT SERPL-MCNC: 0.73 MG/DL (ref 0.6–1.3)
CREAT SERPL-MCNC: 0.77 MG/DL (ref 0.6–1.3)
CREAT SERPL-MCNC: 0.83 MG/DL (ref 0.6–1.3)
ERYTHROCYTE [DISTWIDTH] IN BLOOD BY AUTOMATED COUNT: 20.3 % (ref 11.6–15.1)
GFR SERPL CREATININE-BSD FRML MDRD: 77 ML/MIN/1.73SQ M
GFR SERPL CREATININE-BSD FRML MDRD: 85 ML/MIN/1.73SQ M
GFR SERPL CREATININE-BSD FRML MDRD: 91 ML/MIN/1.73SQ M
GFR SERPL CREATININE-BSD FRML MDRD: 91 ML/MIN/1.73SQ M
GLUCOSE SERPL-MCNC: 146 MG/DL (ref 65–140)
GLUCOSE SERPL-MCNC: 167 MG/DL (ref 65–140)
GLUCOSE SERPL-MCNC: 168 MG/DL (ref 65–140)
GLUCOSE SERPL-MCNC: 171 MG/DL (ref 65–140)
GLUCOSE SERPL-MCNC: 172 MG/DL (ref 65–140)
GLUCOSE SERPL-MCNC: 178 MG/DL (ref 65–140)
GLUCOSE SERPL-MCNC: 195 MG/DL (ref 65–140)
GLUCOSE SERPL-MCNC: 202 MG/DL (ref 65–140)
GLUCOSE SERPL-MCNC: 218 MG/DL (ref 65–140)
GLUCOSE SERPL-MCNC: 226 MG/DL (ref 65–140)
GLUCOSE SERPL-MCNC: 235 MG/DL (ref 65–140)
GLUCOSE SERPL-MCNC: 249 MG/DL (ref 65–140)
GLUCOSE SERPL-MCNC: 250 MG/DL (ref 65–140)
GLUCOSE SERPL-MCNC: 261 MG/DL (ref 65–140)
GLUCOSE SERPL-MCNC: 273 MG/DL (ref 65–140)
GRAM STN SPEC: ABNORMAL
GRAM STN SPEC: ABNORMAL
HCO3 BLDA-SCNC: 25.5 MMOL/L (ref 22–28)
HCT VFR BLD AUTO: 29.1 % (ref 34.8–46.1)
HGB BLD-MCNC: 9.3 G/DL (ref 11.5–15.4)
MAGNESIUM SERPL-MCNC: 1.9 MG/DL (ref 1.9–2.7)
MCH RBC QN AUTO: 29.6 PG (ref 26.8–34.3)
MCHC RBC AUTO-ENTMCNC: 32 G/DL (ref 31.4–37.4)
MCV RBC AUTO: 93 FL (ref 82–98)
NRBC BLD AUTO-RTO: 3 /100 WBCS
O2 CT BLDA-SCNC: 14.2 ML/DL (ref 16–23)
OXYHGB MFR BLDA: 97.4 % (ref 94–97)
P AXIS: 59 DEGREES
PCO2 BLDA: 45.7 MM HG (ref 36–44)
PH BLDA: 7.36 [PH] (ref 7.35–7.45)
PO2 BLDA: 119.9 MM HG (ref 75–129)
POTASSIUM SERPL-SCNC: 3.8 MMOL/L (ref 3.5–5.3)
POTASSIUM SERPL-SCNC: 4.3 MMOL/L (ref 3.5–5.3)
PR INTERVAL: 114 MS
PS SUPP: 10
QRS AXIS: 49 DEGREES
QRSD INTERVAL: 72 MS
QT INTERVAL: 364 MS
QTC INTERVAL: 395 MS
RBC # BLD AUTO: 3.14 MILLION/UL (ref 3.81–5.12)
SIMV VENT INSPIRED AIR FIO2: 70
SIMV VENT PEEP: 14
SIMV VENT: ABNORMAL
SODIUM SERPL-SCNC: 144 MMOL/L (ref 135–147)
SODIUM SERPL-SCNC: 145 MMOL/L (ref 135–147)
SODIUM SERPL-SCNC: 150 MMOL/L (ref 135–147)
SODIUM SERPL-SCNC: 150 MMOL/L (ref 135–147)
SPECIMEN SOURCE: ABNORMAL
T WAVE AXIS: 61 DEGREES
VENT SIMV: 24
VENTRICULAR RATE: 71 BPM
WBC # BLD AUTO: 10.61 THOUSAND/UL (ref 4.31–10.16)

## 2024-02-23 PROCEDURE — 94003 VENT MGMT INPAT SUBQ DAY: CPT

## 2024-02-23 PROCEDURE — 93005 ELECTROCARDIOGRAM TRACING: CPT

## 2024-02-23 PROCEDURE — 99291 CRITICAL CARE FIRST HOUR: CPT | Performed by: INTERNAL MEDICINE

## 2024-02-23 PROCEDURE — 80048 BASIC METABOLIC PNL TOTAL CA: CPT | Performed by: STUDENT IN AN ORGANIZED HEALTH CARE EDUCATION/TRAINING PROGRAM

## 2024-02-23 PROCEDURE — 94760 N-INVAS EAR/PLS OXIMETRY 1: CPT

## 2024-02-23 PROCEDURE — 82805 BLOOD GASES W/O2 SATURATION: CPT | Performed by: STUDENT IN AN ORGANIZED HEALTH CARE EDUCATION/TRAINING PROGRAM

## 2024-02-23 PROCEDURE — 94640 AIRWAY INHALATION TREATMENT: CPT

## 2024-02-23 PROCEDURE — 82948 REAGENT STRIP/BLOOD GLUCOSE: CPT

## 2024-02-23 PROCEDURE — 99233 SBSQ HOSP IP/OBS HIGH 50: CPT | Performed by: INTERNAL MEDICINE

## 2024-02-23 PROCEDURE — 85025 COMPLETE CBC W/AUTO DIFF WBC: CPT | Performed by: STUDENT IN AN ORGANIZED HEALTH CARE EDUCATION/TRAINING PROGRAM

## 2024-02-23 PROCEDURE — 88112 CYTOPATH CELL ENHANCE TECH: CPT | Performed by: STUDENT IN AN ORGANIZED HEALTH CARE EDUCATION/TRAINING PROGRAM

## 2024-02-23 PROCEDURE — 88307 TISSUE EXAM BY PATHOLOGIST: CPT | Performed by: STUDENT IN AN ORGANIZED HEALTH CARE EDUCATION/TRAINING PROGRAM

## 2024-02-23 PROCEDURE — 99024 POSTOP FOLLOW-UP VISIT: CPT | Performed by: STUDENT IN AN ORGANIZED HEALTH CARE EDUCATION/TRAINING PROGRAM

## 2024-02-23 PROCEDURE — 94664 DEMO&/EVAL PT USE INHALER: CPT

## 2024-02-23 PROCEDURE — 83735 ASSAY OF MAGNESIUM: CPT | Performed by: STUDENT IN AN ORGANIZED HEALTH CARE EDUCATION/TRAINING PROGRAM

## 2024-02-23 PROCEDURE — 93010 ELECTROCARDIOGRAM REPORT: CPT | Performed by: INTERNAL MEDICINE

## 2024-02-23 PROCEDURE — 88305 TISSUE EXAM BY PATHOLOGIST: CPT | Performed by: STUDENT IN AN ORGANIZED HEALTH CARE EDUCATION/TRAINING PROGRAM

## 2024-02-23 PROCEDURE — 71045 X-RAY EXAM CHEST 1 VIEW: CPT

## 2024-02-23 RX ORDER — LEVOFLOXACIN 5 MG/ML
750 INJECTION, SOLUTION INTRAVENOUS EVERY 24 HOURS
Status: COMPLETED | OUTPATIENT
Start: 2024-02-24 | End: 2024-02-26

## 2024-02-23 RX ORDER — LEVALBUTEROL INHALATION SOLUTION 1.25 MG/3ML
SOLUTION RESPIRATORY (INHALATION)
Status: COMPLETED
Start: 2024-02-23 | End: 2024-02-23

## 2024-02-23 RX ORDER — LEVOFLOXACIN 5 MG/ML
750 INJECTION, SOLUTION INTRAVENOUS EVERY 24 HOURS
Status: DISCONTINUED | OUTPATIENT
Start: 2024-02-23 | End: 2024-02-23

## 2024-02-23 RX ORDER — PROPOFOL 10 MG/ML
5-50 INJECTION, EMULSION INTRAVENOUS
Status: DISCONTINUED | OUTPATIENT
Start: 2024-02-23 | End: 2024-02-28

## 2024-02-23 RX ORDER — LEVALBUTEROL INHALATION SOLUTION 1.25 MG/3ML
0.31 SOLUTION RESPIRATORY (INHALATION) 2 TIMES DAILY
Status: DISCONTINUED | OUTPATIENT
Start: 2024-02-23 | End: 2024-02-23

## 2024-02-23 RX ORDER — OLANZAPINE 5 MG/1
5 TABLET, ORALLY DISINTEGRATING ORAL ONCE
Status: COMPLETED | OUTPATIENT
Start: 2024-02-23 | End: 2024-02-23

## 2024-02-23 RX ORDER — LEVALBUTEROL INHALATION SOLUTION 1.25 MG/3ML
1.25 SOLUTION RESPIRATORY (INHALATION)
Status: DISCONTINUED | OUTPATIENT
Start: 2024-02-23 | End: 2024-04-08

## 2024-02-23 RX ORDER — ALBUTEROL SULFATE 90 UG/1
2 AEROSOL, METERED RESPIRATORY (INHALATION) 2 TIMES DAILY
Status: DISCONTINUED | OUTPATIENT
Start: 2024-02-23 | End: 2024-02-23

## 2024-02-23 RX ORDER — OLANZAPINE 5 MG/1
5 TABLET ORAL ONCE
Qty: 1 TABLET | Refills: 0 | Status: DISCONTINUED | OUTPATIENT
Start: 2024-02-23 | End: 2024-02-23

## 2024-02-23 RX ORDER — HYDROMORPHONE HCL/PF 1 MG/ML
1 SYRINGE (ML) INJECTION ONCE
Status: COMPLETED | OUTPATIENT
Start: 2024-02-23 | End: 2024-02-23

## 2024-02-23 RX ORDER — POTASSIUM CHLORIDE 14.9 MG/ML
20 INJECTION INTRAVENOUS ONCE
Status: COMPLETED | OUTPATIENT
Start: 2024-02-23 | End: 2024-02-23

## 2024-02-23 RX ORDER — DEXTROSE MONOHYDRATE 50 MG/ML
100 INJECTION, SOLUTION INTRAVENOUS CONTINUOUS
Status: DISCONTINUED | OUTPATIENT
Start: 2024-02-23 | End: 2024-02-23

## 2024-02-23 RX ORDER — FUROSEMIDE 10 MG/ML
40 INJECTION INTRAMUSCULAR; INTRAVENOUS ONCE
Status: COMPLETED | OUTPATIENT
Start: 2024-02-23 | End: 2024-02-23

## 2024-02-23 RX ORDER — MIDAZOLAM HYDROCHLORIDE 2 MG/2ML
1 INJECTION, SOLUTION INTRAMUSCULAR; INTRAVENOUS ONCE
Status: COMPLETED | OUTPATIENT
Start: 2024-02-23 | End: 2024-02-23

## 2024-02-23 RX ORDER — HYDRALAZINE HYDROCHLORIDE 20 MG/ML
10 INJECTION INTRAMUSCULAR; INTRAVENOUS EVERY 6 HOURS PRN
Status: DISCONTINUED | OUTPATIENT
Start: 2024-02-23 | End: 2024-02-27

## 2024-02-23 RX ORDER — POTASSIUM CHLORIDE 14.9 MG/ML
20 INJECTION INTRAVENOUS ONCE
Qty: 100 ML | Refills: 0 | Status: COMPLETED | OUTPATIENT
Start: 2024-02-23 | End: 2024-02-23

## 2024-02-23 RX ADMIN — POTASSIUM CHLORIDE 20 MEQ: 14.9 INJECTION, SOLUTION INTRAVENOUS at 11:43

## 2024-02-23 RX ADMIN — REMDESIVIR 100 MG: 100 INJECTION, POWDER, LYOPHILIZED, FOR SOLUTION INTRAVENOUS at 12:15

## 2024-02-23 RX ADMIN — CHLORHEXIDINE GLUCONATE 0.12% ORAL RINSE 15 ML: 1.2 LIQUID ORAL at 20:23

## 2024-02-23 RX ADMIN — MIDAZOLAM 1 MG: 1 INJECTION INTRAMUSCULAR; INTRAVENOUS at 12:06

## 2024-02-23 RX ADMIN — IPRATROPIUM BROMIDE 0.5 MG: 0.5 SOLUTION RESPIRATORY (INHALATION) at 13:48

## 2024-02-23 RX ADMIN — HYDROMORPHONE HYDROCHLORIDE 0.5 MG: 1 INJECTION, SOLUTION INTRAMUSCULAR; INTRAVENOUS; SUBCUTANEOUS at 20:23

## 2024-02-23 RX ADMIN — NYSTATIN: 100000 POWDER TOPICAL at 10:25

## 2024-02-23 RX ADMIN — POTASSIUM CHLORIDE 20 MEQ: 14.9 INJECTION, SOLUTION INTRAVENOUS at 14:04

## 2024-02-23 RX ADMIN — OLANZAPINE 5 MG: 5 TABLET, ORALLY DISINTEGRATING ORAL at 15:31

## 2024-02-23 RX ADMIN — HEPARIN SODIUM 5000 UNITS: 5000 INJECTION INTRAVENOUS; SUBCUTANEOUS at 06:02

## 2024-02-23 RX ADMIN — GABAPENTIN 100 MG: 100 CAPSULE ORAL at 15:45

## 2024-02-23 RX ADMIN — HEPARIN SODIUM 5000 UNITS: 5000 INJECTION INTRAVENOUS; SUBCUTANEOUS at 14:04

## 2024-02-23 RX ADMIN — LEVOFLOXACIN 750 MG: 750 INJECTION, SOLUTION INTRAVENOUS at 10:26

## 2024-02-23 RX ADMIN — GABAPENTIN 100 MG: 100 CAPSULE ORAL at 20:23

## 2024-02-23 RX ADMIN — IPRATROPIUM BROMIDE 0.5 MG: 0.5 SOLUTION RESPIRATORY (INHALATION) at 19:20

## 2024-02-23 RX ADMIN — HYDROMORPHONE HYDROCHLORIDE 1 MG: 1 INJECTION, SOLUTION INTRAMUSCULAR; INTRAVENOUS; SUBCUTANEOUS at 12:20

## 2024-02-23 RX ADMIN — DEXMEDETOMIDINE HYDROCHLORIDE 0.9 MCG/KG/HR: 4 INJECTION, SOLUTION INTRAVENOUS at 21:42

## 2024-02-23 RX ADMIN — NOREPINEPHRINE BITARTRATE 5 MCG/MIN: 1 SOLUTION INTRAVENOUS at 15:38

## 2024-02-23 RX ADMIN — HEPARIN SODIUM 5000 UNITS: 5000 INJECTION INTRAVENOUS; SUBCUTANEOUS at 21:42

## 2024-02-23 RX ADMIN — POTASSIUM CHLORIDE 20 MEQ: 14.9 INJECTION, SOLUTION INTRAVENOUS at 03:34

## 2024-02-23 RX ADMIN — HYDROMORPHONE HYDROCHLORIDE 0.5 MG: 1 INJECTION, SOLUTION INTRAMUSCULAR; INTRAVENOUS; SUBCUTANEOUS at 11:41

## 2024-02-23 RX ADMIN — OLANZAPINE 5 MG: 5 TABLET, ORALLY DISINTEGRATING ORAL at 21:42

## 2024-02-23 RX ADMIN — NYSTATIN: 100000 POWDER TOPICAL at 18:04

## 2024-02-23 RX ADMIN — DEXMEDETOMIDINE HYDROCHLORIDE 1 MCG/KG/HR: 4 INJECTION, SOLUTION INTRAVENOUS at 12:50

## 2024-02-23 RX ADMIN — PROPOFOL 35 MCG/KG/MIN: 10 INJECTION, EMULSION INTRAVENOUS at 18:09

## 2024-02-23 RX ADMIN — CHLORHEXIDINE GLUCONATE 0.12% ORAL RINSE 15 ML: 1.2 LIQUID ORAL at 08:28

## 2024-02-23 RX ADMIN — PROPOFOL 5 MCG/KG/MIN: 10 INJECTION, EMULSION INTRAVENOUS at 12:37

## 2024-02-23 RX ADMIN — METHYLPREDNISOLONE SODIUM SUCCINATE 40 MG: 40 INJECTION, POWDER, FOR SOLUTION INTRAMUSCULAR; INTRAVENOUS at 08:28

## 2024-02-23 RX ADMIN — LEVALBUTEROL HYDROCHLORIDE 1.25 MG: 1.25 SOLUTION RESPIRATORY (INHALATION) at 19:20

## 2024-02-23 RX ADMIN — LEVALBUTEROL HYDROCHLORIDE 1.25 MG: 1.25 SOLUTION RESPIRATORY (INHALATION) at 13:47

## 2024-02-23 RX ADMIN — FUROSEMIDE 40 MG: 10 INJECTION, SOLUTION INTRAMUSCULAR; INTRAVENOUS at 14:04

## 2024-02-23 RX ADMIN — GABAPENTIN 100 MG: 100 CAPSULE ORAL at 08:28

## 2024-02-23 RX ADMIN — DEXTROSE 100 ML/HR: 5 SOLUTION INTRAVENOUS at 03:34

## 2024-02-23 RX ADMIN — METHYLPREDNISOLONE SODIUM SUCCINATE 40 MG: 40 INJECTION, POWDER, FOR SOLUTION INTRAMUSCULAR; INTRAVENOUS at 20:23

## 2024-02-23 RX ADMIN — SODIUM CHLORIDE 8 UNITS/HR: 9 INJECTION, SOLUTION INTRAVENOUS at 15:45

## 2024-02-23 RX ADMIN — HYDRALAZINE HYDROCHLORIDE 10 MG: 20 INJECTION INTRAMUSCULAR; INTRAVENOUS at 11:41

## 2024-02-23 NOTE — PROGRESS NOTES
"Progress Note - General Surgery  Carla Hunt 59 y.o. female MRN: [id number]  Unit/Bed#: [facility] 10 Encounter: [id number]      Assessment:  59 y.o. 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    Vent: 24/14/10/70%   AB.36/46/120/25/0  Cr 0.77  CBC pending    Plan:  - Advance tube feed rate by 10cc/hr every four hours to goal rate  - Monitor and record ostomy output  - Local wound care to midline  - Remainder of care per primary team. Please reach out with any questions or concerns.        Subjective: Patient with tachycardia and hypertension early in the night. Improved with dilaudid gtt. Afebrile. Ostomy productive. Not requiring pressors.      Objective:   Temp:  [96.6 °F (35.9 °C)-98.8 °F (37.1 °C)] 96.6 °F (35.9 °C)  HR:  [] 64  Resp:  [20-34] 24  BP: ()/() 125/60  Arterial Line BP: ()/(42-94) 124/53  FiO2 (%):  [] 70    Physical Exam:  General: No acute distress  CV: Well perfused, regular rate  Lungs: Requiring ventilatory support  Abdomen: Soft, appropriately-tender, non-distended. Incision(s) clean, dry and intact. Ostomy productive  Extremities: No clubbing or cyanosis  Skin: Warm, dry      I/O          0701   0700  0701   0700  0701   0700    I.V. (mL/kg) 2225.4 (30.1) 826.9 (11)     Blood 800      NG/GT 50 970     IV Piggyback 670 520     Feedings  307     Total Intake(mL/kg) 3745.4 (50.7) 2623.9 (34.8)     Urine (mL/kg/hr) 2305 (1.3) 2280 (1.3)     Emesis/NG output 150 0     Stool 475 550     Blood       Total Output 2930 2830     Net +815.4 -206.1                    Lab, Imaging and other studies: I have personally reviewed pertinent reports.  , CBC with diff: No results found for: \"WBC\", \"HGB\", \"HCT\", \"MCV\", \"PLT\", \"ADJUSTEDWBC\", \"RBC\", \"MCH\", \"MCHC\", \"RDW\", \"MPV\", \"NRBC\", BMP/CMP:   Lab Results   Component Value Date "    SODIUM 150 (H) 02/23/2024    K 4.3 02/23/2024     (H) 02/23/2024    CO2 28 02/23/2024    BUN 47 (H) 02/23/2024    CREATININE 0.77 02/23/2024    CALCIUM 10.0 02/23/2024    EGFR 85 02/23/2024           Thomas Olson MD  General Surgery   02/23/24

## 2024-02-23 NOTE — RESPIRATORY THERAPY NOTE
RT Ventilator Management Note  Carla Hunt 59 y.o. female MRN: [id number]  Unit/Bed#: [facility] 10 Encounter: [id number]      Daily Screen         2/21/2024 1948 2/22/2024  0903          Patient safety screen outcome:: Failed Failed      Not Ready for Weaning due to:: Underline problem not resolved Underline problem not resolved                Physical Exam:   Assessment Type: Assess only  General Appearance: Sedated  Respiratory Pattern: Assisted  Chest Assessment: Chest expansion symmetrical  Bilateral Breath Sounds: Diminished, Coarse  Cough: Productive  Suction: ET Tube  O2 Device: Vent      Resp Comments: Pt remains on same vent settings no complications noted

## 2024-02-23 NOTE — PROGRESS NOTES
[facility]  Progress Note: Critical Care  Name: Carla Hunt 59 y.o. female I MRN: [id number]  Unit/Bed#: MICU 10 I Date of Admission: 1/10/2024   Date of Service: 2/23/2024 I Hospital Day: 44    Assessment/Plan     Acute hypoxic respiratory failure due to severe covid/covid induced pneumonitis  Partial cecal volvulus s/p right hemicolectomy  Stenotrophomonas pneumonia  Steroid induced hyperglycemia  Anemia  Hypernatremia  B cell lymphoma  History of small SAH  CKD stage III  Hypertriglyceridemia  Sacral ulcer, unstageable  Seizure disorder  Anxiety     Neuro:   Sedation: Off propofol, Off versed, On precedex drip 0.2,  dilaudid drip at 1.  -Aim to maintain RASS 0, currently between RASS 0 to -1   -Repeat triglyceride level at 305 from 02/20  -Plan to continue with current sedation regimen     Diagnosis: seizure disorder  -S/p partial left temporal lobe resection in 2007  -Contuinue home lamictal 150 bid and topamax 100 bid     Diagnosis: Anxiety  -On venlafaxine 25 mg TID via NGT  -Seroquel 12.5 hs and melatonin 6 hs for sleep     CV:   Diagnosis: Distributive shock, improving with systolics in the 110-130s mmHg  -Off Levophed at this time     Pulm:  Diagnosis: Acute hypoxic respiratory failure due to severe covid  and covid induced fibrosis  -Tested COVID positive on 1/7  -Completed severe COVID pathway and 7 days CTX  -Tenuous respiratory status requiring multiple re-admissions to MICU  -S/p Bronch 2/2 and 02/16  -NG on cultures (initially showed non-group A strep)  -PCP and AFB negative   -Legionella, viral, fungal cultures no growth to date  -Pulse dose steroids with solumedrol 1g daily x3 days (completed 2/7) then transitioned to prednisone 80mg daily on 2/8  -Off veletri  -Completed IVIG for 5 days  -CRP trend 221 (02/19) > 116.9 (02/20) > 94.2 (02/20) > 335.8 (02/21). Limited benefit in trending CRP at this time since it maybe elevated due to multiple other  factors including recent volvulus and surgery. 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) though unclear how this was diagnosed, this also does not enirely fit as she is NOT hypokalemic  -Continue to monitor I/O and UOP     Diagnosis: Hypernatremia  -Sodium at 150 this morning  -Plan to continue on D5W IV fluid infusion with BMPs every 4 hours to monitor this    Diagnosis: Acute kidney injury, sCr at 0.99, resolved        F/E/N:   F: ON IVF with D5w 100 mL/hr  E: monitor and replete for goal K>4, P>3, Mg>2  N: On tube feeds with glucerna     Heme/Onc:   Diagnosis: B cell lymphoma  -Follows with heme/onc at [facility]  -On rituxan for 2 year course, started May 2022  -Last dose was 12/20, treatment currently on hold   -Leukopenic on admission but WBC now wnl     DVT ppx with Heparin subcutaneous     Endo:   Diagnosis: steroid hyperglycemia and diabetes mellitus type 2, A1c at 6.6 from 01/2024  -Goal -180  -BG range last 24hrs 148-226  -On insulin drip     ID:   Diagnosis: Severe covid pneumonia and stenotrophomonas pneumonia  -Completed severe COVID pathway with decadron (ended 1/22) and remdesivir (ended 1/20), also completed 7 days CTX on 1/15  -C/f opportunistic infections given immunocompromised status on chemotherapy and recent steroid use  -No consolidations on CXR and procal negative  -S/p bactrim and minocycline x5 days for stenotrophomonas on sputum  culture (1/25), then on bactrim for PJP ppx  -Bronc on 2/2 - Cx w/o growth (initially resulted as 1+ alpha hemolytic strep), AFB & PCP negative, f/u fungal, legionella, and viral cultures   -2/10 pt again had escalating O2 requirements on floors necessitating readmission to ICU  -Concern that with recent pulse dose steroids and now worsening respiratory status she could have infection, possibly opportunistic   -UA with moderate leukocytes, nitrite negative, 30-50 WBC, trace protein.  -Repeat sputum culture 2/9 with 4+ stenotrophomonas  -Bronch cultures with candida albicans, Rare gram positive cocci in pairs  -Was on high dose bactrim, received 10 days of this. Was also on vancomycin and cefepime. As per ID recommendations from 02/221, now off all three and switched to levaquin 750 mg IV ever 48 hours, Plan to continue for 5 days.  -As per ID recommendations, on remdesevir for 5 days  -On levaquin 750 mg day 5 of 5     MSK/Skin:   -Wound care team following for bilateral sacral wounds    Disposition: Critical care    ICU Core Measures     Vented Patient  VAP Bundle  VAP bundle ordered     A: Assess, Prevent, and Manage Pain Has pain been assessed? Yes  Need for changes to pain regimen? No   B: Both Spontaneous Awakening Trials (SATs) and Spontaneous Breathing Trials (SBTs) Plan to perform spontaneous awakening trial today? Modified and patient remained on precedex   Plan to perform spontaneous breathing trial today? Modified and patient remained on precedex   Obvious barriers to extubation? Yes   C: Choice of Sedation RASS Goal: 0 Alert and Calm  Need for changes to sedation or analgesia regimen? No   D: Delirium CAM-ICU: Negative   E: Early Mobility  Plan for early mobility? Yes   F: Family Engagement Plan for family engagement today? Yes       Antibiotic Review: Patient on appropriate coverage based on culture data.     Review of Invasive Devices:    Diana Plan: Continue for accurate I/O monitoring for 48  hours  Central access plan:  Continue for now, on multiple drips  Amelia Plan: Keep arterial line for frequent ABGs    Prophylaxis:  VTE VTE covered by:  heparin (porcine), Subcutaneous, 5,000 Units at 02/23/24 0602       Stress Ulcer  not ordered        Significant 24hr Events     24hr events: Overnight, patient maintained on dilaudid and precedex drip.      Subjective   Patient seen by bedside, plan to reach out to patient's family to discuss medical updates and medical treatment plan.    Review of Systems   Unable to perform ROS: Patient nonverbal        Objective                            Vitals I/O      Most Recent Min/Max in 24hrs   Temp (!) 96.6 °F (35.9 °C) Temp  Min: 96.6 °F (35.9 °C)  Max: 99 °F (37.2 °C)   Pulse 71 Pulse  Min: 67  Max: 126   Resp (!) 25 Resp  Min: 19  Max: 34   /84 BP  Min: 82/51  Max: 188/103   O2 Sat 99 % SpO2  Min: 91 %  Max: 99 %   On Precedex drip at 0.2, on insulin drip at 3, on Dilaudid drip at 1.  D5W infusion running at 100 mL/h.  Vent settings with respiratory rate of 24, 10/8, FiO2 of 50%   Intake/Output Summary (Last 24 hours) at 2/23/2024 0646  Last data filed at 2/23/2024 0605  Gross per 24 hour   Intake 2723.93 ml   Output 2830 ml   Net -106.07 ml       Diet Enteral/Parenteral; Tube Feeding No Oral Diet; Glucerna 1.2; Continuous; 20; 300; Water; Every 4 hours    Invasive Monitoring   Arterial Line  Amelia /53  Arterial Line BP  Min: 72/42  Max: 221/94   MAP 74 mmHg  Arterial Line MAP (mmHg)  Min: 52 mmHg  Max: 136 mmHg           Physical Exam   Physical Exam  Vitals reviewed.   Skin:     General: Skin is warm.      Capillary Refill: Capillary refill takes less than 2 seconds.   HENT:      Head: Normocephalic.      Mouth/Throat:      Mouth: Mucous membranes are moist.   Cardiovascular:      Rate and Rhythm: Normal rate and regular rhythm.      Pulses: Normal pulses.   Musculoskeletal:      Comments: 1+ edema in both hands and lower extremity noted.   Abdominal:  General: Bowel sounds are normal.      Palpations: Abdomen is soft.      Comments: Ostomy pouch with site CDI, dark brown-colored stool noted   Constitutional:       Appearance: She is ill-appearing.      Interventions: She is sedated and intubated.      Comments: Patient with ETT and NG tube in place.  Has a Ortiz, central line, left forearm arterial line.  Ostomy pouch in the right side of the abdomen.  1 peripheral IV noted on right hand.   Pulmonary:      Effort: Pulmonary effort is normal. She is intubated.      Comments: Mechanically ventilated breath sounds, wheezing noted bilaterally, appears to be audible  Neurological:      Comments: Currently intubated and nonverbal, but does open her eyes to verbal stimuli.  Able to follow commands in all extremities.  Withdraws to pain but noxious stimuli.   Genitourinary/Anorectal:  Ortiz present.          Diagnostic Studies      EKG: Reviewed  Imaging: Reviewed I have personally reviewed pertinent reports.       Medications:  Scheduled PRN   chlorhexidine, 15 mL, Q12H SARTHAK  gabapentin, 100 mg, TID  heparin (porcine), 5,000 Units, Q8H SARTHAK  levalbuterol, 0.31 mg, BID  levofloxacin, 750 mg, Q24H  methylPREDNISolone sodium succinate, 40 mg, Q12H SARTHAK  midazolam, 4 mg, Once  nystatin, , BID  OLANZapine, 5 mg, HS  remdesivir, 100 mg, Q24H      acetaminophen, 1,000 mg, Q8H PRN  HYDROmorphone, 0.5 mg, Q1H PRN  midazolam, 2 mg, Q4H PRN  ondansetron, 4 mg, Q6H PRN       Continuous    dexmedetomidine, 0.1-1.2 mcg/kg/hr, Last Rate: 0.2 mcg/kg/hr (02/22/24 1650)  dextrose, 100 mL/hr, Last Rate: 100 mL/hr (02/23/24 0605)  HYDROmorphone, 1 mg/hr, Last Rate: 1 mg/hr (02/23/24 0605)  insulin regular (HumuLIN R,NovoLIN R) 1 Units/mL in sodium chloride 0.9 % 100 mL infusion, 0.3-21 Units/hr, Last Rate: 3 Units/hr (02/23/24 0605)  midazolam, 2 mg/hr, Last Rate: Stopped (02/22/24 1159)  norepinephrine, 1-30 mcg/min, Last Rate: Stopped (02/22/24 1506)         Labs:    CBC    Recent Labs      02/21/24 2035 02/22/24  0436   WBC  --  23.58*   HGB 10.7* 11.4*   HCT 33.6* 36.0   PLT  --  237     BMP    Recent Labs     02/22/24 2018 02/23/24  0156   SODIUM 150* 150*   K 4.1 3.8   * 115*   CO2 29 29   AGAP 6 6   BUN 49* 49*   CREATININE 0.92 0.83   CALCIUM 10.4* 10.3*       Coags    No recent results     Additional Electrolytes  Recent Labs     02/22/24  0436   MG 2.0          Blood Gas    Recent Labs     02/23/24  0603   PHART 7.364   WGT4KTX 45.7*   PO2ART 119.9   PDK4OWT 25.5   BEART -0.1   SOURCE Line, Arterial     Recent Labs     02/23/24  0603   SOURCE Line, Arterial    LFTs  No recent results    Infectious  No recent results  Glucose  Recent Labs     02/22/24  0436 02/22/24  0831 02/22/24 2018 02/23/24  0156   GLUC 249* 258* 159* 195*               Miguel Whittaker MD

## 2024-02-23 NOTE — PROGRESS NOTES
Progress Note - Infectious Disease   Carla Hunt 59 y.o. female MRN: [id number]  [facility]: [id number]      Impression/Recommendations:  Possible bacterial pneumonia.  Initially, on admission early January, secondary to mostly COVID but there was concern for concurrent bacterial pneumonia on admission due to elevated procalcitonin. Patient completed treatment course for both COVID and bacterial pneumonia.  She was clinically improved with decreasing O2 requirement.  However, patient deteriorated in late January, requiring to be placed back on high flow O2 support.  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  Procalcitonin x 3 were in the normal range.  Patient has no fever or leukocytosis.  She improved with increasing steroid dose.  She completed a 5-day course of Bactrim/minocycline for presumptive stenotrophomonas respiratory infection, with expectorated sputum growing stenotrophomonas.  She subsequently had bronchoscopy, with BAL culture negative for bacterial growth, AFB and PCP but completed the course of Bactrim/minocycline prior to bronchoscopy.  Patient improved and O2 was being weaned again until 2 weeks ago, when she deteriorated again.  She is now back in ICU.  Expectorated sputum once again is growing stenotrophomonas.  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  Not sure that the stenotrophomonas that is growing again and expectorated sputum is pathogenic versus upper airway colonization.  Patient was restarted on high-dose steroid and Bactrim.  Patient's respiratory status remained tenuous throughout, required intubation subsequently.  She had bronchoscopy on 2/16 with moderate secretion.  BAL culture had no growth but COVID PCR was positive.  Bactrim was changed to Levaquin over weekend due to ELIESER.  Remdesivir was restarted for possible COVID relapse.  Due to worsening consolidations on CT,  patient is status post repeat bronchoscopy on 2/21, with some purulent secretion.  BAL culture with no bacterial growth thus far.  Growth of Candida is most likely airway colonization in patient on antibiotic.  Given purulent secretion and improvement on Levaquin, will have patient complete Levaquin course.  Continue Levaquin.  Treat x 7 days total Levaquin.  Continue systemic corticosteroid, with weaning, per critical care service  Monitor temperature/WBC.  Follow-up on final BAL culture.      Severe COVID, present on admission.  Patient was treated with remdesivir, dexamethasone and was given 1 dose of Tocilizumab on admission.  She also had a course of antibiotic initially for presumptive bacterial superinfection.  COVID antigen was negative prior to coming off isolation.  Current chest CT is showing extensive fibrotic changes.  Persistently positive COVID PCR in BAL noted.  This may be all prolonged shedding.  However, given immunosuppressed state and lack of response to antibiotic and high-dose steroid thus far, will treat patient with another course of remdesivir.  Completed another course of remdesivir x 5-10 days.     Acute hypoxic respiratory failure, likely multifactorial, with both COVID and bacterial ammonia contributing, with patient back on ventilator last week.  She remains on ventilator but oxygenation and ventilatory support are significantly improved over the last 24 hours.  Ventilator support and weaning per critical care medicine service.     CKD.  Creatinine worsened on Bactrim.  Patient is now off Bactrim.  Creatinine has improved.  Monitor creatinine.     5. 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  Patient is status post exploratory laparotomy with ileocecectomy and end ileostomy creation.  Surgery follow-up.     B-cell lymphoma, on chemotherapy, which is currently postponed.  Patient was leukopenic on admission but resolved.  Monitor WBC/ANC.     Discussed with patient's  and  multiple other family members in detail regarding the above plan.  Discussed with Dr. Eubanks from critical care medicine service regarding improvement over the last 24 hours and continuation of Levaquin and remdesivir course as in above.  He is in agreement.       Antibiotics:  Levaquin # 5  Antibiotic # 14     Subjective:  Patient remains intubated.  She remains sedated but has some response to verbal stimuli  Temperature stays down.  No chills.  She is tolerating antibiotic well.  No nausea, vomiting or diarrhea.    Objective:  Vitals:  Temp:  [96.6 °F (35.9 °C)-98.4 °F (36.9 °C)] 97.9 °F (36.6 °C)  HR:  [] 96  Resp:  [16-41] 30  BP: (105-188)/() 167/74  SpO2:  [87 %-99 %] 87 %  Temp (24hrs), Av.7 °F (36.5 °C), Min:96.6 °F (35.9 °C), Max:98.4 °F (36.9 °C)  Current: Temperature: 97.9 °F (36.6 °C)    Physical Exam:     General: Sedated on ventilator.  Attempts to open eyes with verbal stimuli.  Comfortable.  Nontoxic.   Neck:  Supple. No mass.  No lymphadenopathy.   Lungs: Expansion symmetric, stable diffuse rhonchi, no rales, no wheezing.   Heart:  Regular rate and rhythm, S1 and S2 normal, no murmur.   Abdomen: Soft, nondistended, non-tender, bowel sounds active all four quadrants, no masses, no organomegaly.   Extremities: Stable mild leg edema. No erythema/warmth. No ulcer. Nontender to palpation.   Skin:  No rash.   Neuro: Not assessable.     Invasive Devices       Central Venous Catheter Line  Duration             CVC Central Lines 24 2 days              Peripheral Intravenous Line  Duration             Peripheral IV 24 Right Antecubital <1 day              Arterial Line  Duration             Arterial Line 24 Radial 9 days              Drain  Duration             Urethral Catheter Latex 16 Fr. 3 days    Ileostomy RUQ 2 days    NG/OG/Enteral Tube Nasogastric 18 Fr Left nare 2 days              Airway  Duration             ETT  Cuffed 7.5 mm 9 days                    Labs  studies:   I have personally reviewed pertinent labs.  Results from last 7 days   Lab Units 02/23/24  0602 02/23/24  0156 02/22/24 2018 02/20/24  1759 02/20/24  1523 02/20/24  1515 02/20/24  1412 02/20/24  1412 02/20/24  1010 02/17/24  0511 02/16/24  1833   POTASSIUM mmol/L 4.3 3.8 4.1   < >  --   --   --   --  6.1*   < >  --    CHLORIDE mmol/L 115* 115* 115*   < >  --   --   --   --  113*   < >  --    CO2 mmol/L 28 29 29   < >  --   --   --   --  27   < >  --    CO2, I-STAT mmol/L  --   --   --   --  23 24   < > 31  --   --   --    BUN mg/dL 47* 49* 49*   < >  --   --   --   --  95*   < >  --    CREATININE mg/dL 0.77 0.83 0.92   < >  --   --   --   --  1.62*   < >  --    EGFR ml/min/1.73sq m 85 77 68   < >  --   --   --   --  34   < >  --    GLUCOSE, ISTAT mg/dl  --   --   --   --  195* 209*  --  128  --   --   --    CALCIUM mg/dL 10.0 10.3* 10.4*   < >  --   --   --   --  11.8*   < >  --    AST U/L  --   --   --   --   --   --   --   --  20  --  17   ALT U/L  --   --   --   --   --   --   --   --  18  --  24   ALK PHOS U/L  --   --   --   --   --   --   --   --  68  --  72    < > = values in this interval not displayed.     Results from last 7 days   Lab Units 02/23/24  0602 02/22/24  0436 02/21/24 2035 02/21/24  1533 02/21/24  0553 02/20/24  1759   WBC Thousand/uL 10.61* 23.58*  --   --  13.78* 11.89*   HEMOGLOBIN g/dL 9.3* 11.4* 10.7*   < > 7.2* 7.5*   PLATELETS Thousands/uL  --  237  --   --  196 224    < > = values in this interval not displayed.     Results from last 7 days   Lab Units 02/21/24  1713 02/21/24  1710 02/21/24  1704 02/18/24  0926 02/17/24  1818   BLOOD CULTURE   --   --   --   --  No Growth After 5 Days.  No Growth After 5 Days.   GRAM STAIN RESULT  2+ Polys*  1+ Yeast* Rare Polys  No bacteria seen Rare Polys  No bacteria seen  --   --    MRSA CULTURE ONLY   --   --   --  No Methicillin Resistant Staphlyococcus aureus (MRSA) isolated  --        Imaging Studies:   I have personally  reviewed pertinent imaging study reports and images in PACS.    EKG, Pathology, and Other Studies:   I have personally reviewed pertinent reports.

## 2024-02-23 NOTE — QUICK NOTE
2/23/2024 9:06 AM -  Carla Torresisak's chart and case were reviewed by Hannah Ramirez PA-C.  Mode of review included electronic chart check.    Primary team continues to manage symptoms. Palliative MSW to follow-up for support. Otherwise, will return on Monday 2/26 for family meeting at 3:30pm       For urgent issues or any questions/concerns, please notify on-call provider via [employer].  You may also call our answering service 24/7 at [phone].    Hannah Ramirez PA-C  Palliative and Supportive Care  Clinic/Answering Service: [phone]  You can find me on [employer]!

## 2024-02-24 ENCOUNTER — APPOINTMENT (INPATIENT)
Dept: RADIOLOGY | Facility: HOSPITAL | Age: 59
DRG: 003 | End: 2024-02-24
Payer: COMMERCIAL

## 2024-02-24 LAB
ANION GAP SERPL CALCULATED.3IONS-SCNC: 10 MMOL/L
ANION GAP SERPL CALCULATED.3IONS-SCNC: 6 MMOL/L
ANION GAP SERPL CALCULATED.3IONS-SCNC: 6 MMOL/L
ANION GAP SERPL CALCULATED.3IONS-SCNC: 9 MMOL/L
ANISOCYTOSIS BLD QL SMEAR: PRESENT
BASE EXCESS BLDA CALC-SCNC: 1.1 MMOL/L
BUN SERPL-MCNC: 39 MG/DL (ref 5–25)
BUN SERPL-MCNC: 41 MG/DL (ref 5–25)
BUN SERPL-MCNC: 42 MG/DL (ref 5–25)
BUN SERPL-MCNC: 43 MG/DL (ref 5–25)
BUN SERPL-MCNC: 43 MG/DL (ref 5–25)
BUN SERPL-MCNC: 44 MG/DL (ref 5–25)
CALCIUM SERPL-MCNC: 10.1 MG/DL (ref 8.4–10.2)
CALCIUM SERPL-MCNC: 10.1 MG/DL (ref 8.4–10.2)
CALCIUM SERPL-MCNC: 10.4 MG/DL (ref 8.4–10.2)
CALCIUM SERPL-MCNC: 10.5 MG/DL (ref 8.4–10.2)
CALCIUM SERPL-MCNC: 10.5 MG/DL (ref 8.4–10.2)
CALCIUM SERPL-MCNC: 10.6 MG/DL (ref 8.4–10.2)
CHLORIDE SERPL-SCNC: 105 MMOL/L (ref 96–108)
CHLORIDE SERPL-SCNC: 105 MMOL/L (ref 96–108)
CHLORIDE SERPL-SCNC: 106 MMOL/L (ref 96–108)
CHLORIDE SERPL-SCNC: 109 MMOL/L (ref 96–108)
CHLORIDE SERPL-SCNC: 110 MMOL/L (ref 96–108)
CHLORIDE SERPL-SCNC: 110 MMOL/L (ref 96–108)
CO2 SERPL-SCNC: 28 MMOL/L (ref 21–32)
CO2 SERPL-SCNC: 29 MMOL/L (ref 21–32)
CO2 SERPL-SCNC: 30 MMOL/L (ref 21–32)
CO2 SERPL-SCNC: 30 MMOL/L (ref 21–32)
CO2 SERPL-SCNC: 31 MMOL/L (ref 21–32)
CO2 SERPL-SCNC: 31 MMOL/L (ref 21–32)
CREAT SERPL-MCNC: 0.67 MG/DL (ref 0.6–1.3)
CREAT SERPL-MCNC: 0.68 MG/DL (ref 0.6–1.3)
CREAT SERPL-MCNC: 0.7 MG/DL (ref 0.6–1.3)
CREAT SERPL-MCNC: 0.7 MG/DL (ref 0.6–1.3)
CREAT SERPL-MCNC: 0.72 MG/DL (ref 0.6–1.3)
CREAT SERPL-MCNC: 0.73 MG/DL (ref 0.6–1.3)
ERYTHROCYTE [DISTWIDTH] IN BLOOD BY AUTOMATED COUNT: 20.1 % (ref 11.6–15.1)
GFR SERPL CREATININE-BSD FRML MDRD: 91 ML/MIN/1.73SQ M
GFR SERPL CREATININE-BSD FRML MDRD: 92 ML/MIN/1.73SQ M
GFR SERPL CREATININE-BSD FRML MDRD: 95 ML/MIN/1.73SQ M
GFR SERPL CREATININE-BSD FRML MDRD: 95 ML/MIN/1.73SQ M
GFR SERPL CREATININE-BSD FRML MDRD: 96 ML/MIN/1.73SQ M
GFR SERPL CREATININE-BSD FRML MDRD: 97 ML/MIN/1.73SQ M
GLUCOSE SERPL-MCNC: 112 MG/DL (ref 65–140)
GLUCOSE SERPL-MCNC: 114 MG/DL (ref 65–140)
GLUCOSE SERPL-MCNC: 128 MG/DL (ref 65–140)
GLUCOSE SERPL-MCNC: 129 MG/DL (ref 65–140)
GLUCOSE SERPL-MCNC: 133 MG/DL (ref 65–140)
GLUCOSE SERPL-MCNC: 137 MG/DL (ref 65–140)
GLUCOSE SERPL-MCNC: 138 MG/DL (ref 65–140)
GLUCOSE SERPL-MCNC: 139 MG/DL (ref 65–140)
GLUCOSE SERPL-MCNC: 141 MG/DL (ref 65–140)
GLUCOSE SERPL-MCNC: 142 MG/DL (ref 65–140)
GLUCOSE SERPL-MCNC: 211 MG/DL (ref 65–140)
GLUCOSE SERPL-MCNC: 212 MG/DL (ref 65–140)
GLUCOSE SERPL-MCNC: 231 MG/DL (ref 65–140)
GLUCOSE SERPL-MCNC: 277 MG/DL (ref 65–140)
GLUCOSE SERPL-MCNC: 278 MG/DL (ref 65–140)
GLUCOSE SERPL-MCNC: 283 MG/DL (ref 65–140)
GLUCOSE SERPL-MCNC: 283 MG/DL (ref 65–140)
GLUCOSE SERPL-MCNC: 313 MG/DL (ref 65–140)
HCO3 BLDA-SCNC: 26.2 MMOL/L (ref 22–28)
HCT VFR BLD AUTO: 29.2 % (ref 34.8–46.1)
HGB BLD-MCNC: 10 G/DL (ref 11.5–15.4)
HOROWITZ INDEX BLDA+IHG-RTO: 70 MM[HG]
LACTATE SERPL-SCNC: 1.4 MMOL/L (ref 0.5–2)
MACROCYTES BLD QL AUTO: PRESENT
MAGNESIUM SERPL-MCNC: 1.6 MG/DL (ref 1.9–2.7)
MCH RBC QN AUTO: 29.9 PG (ref 26.8–34.3)
MCHC RBC AUTO-ENTMCNC: 34.2 G/DL (ref 31.4–37.4)
MCV RBC AUTO: 87 FL (ref 82–98)
NRBC BLD AUTO-RTO: 2 /100 WBCS
O2 CT BLDA-SCNC: 14.8 ML/DL (ref 16–23)
OSMOLALITY UR/SERPL-RTO: 322 MMOL/KG (ref 282–298)
OXYHGB MFR BLDA: 93.7 % (ref 94–97)
PCO2 BLDA: 44 MM HG (ref 36–44)
PEEP RESPIRATORY: 8 CM[H2O]
PH BLDA: 7.39 [PH] (ref 7.35–7.45)
PLATELET # BLD AUTO: 98 THOUSANDS/UL (ref 149–390)
PLATELET BLD QL SMEAR: ABNORMAL
PMV BLD AUTO: 11.8 FL (ref 8.9–12.7)
PO2 BLDA: 77.1 MM HG (ref 75–129)
POIKILOCYTOSIS BLD QL SMEAR: PRESENT
POLYCHROMASIA BLD QL SMEAR: PRESENT
POTASSIUM SERPL-SCNC: 3.4 MMOL/L (ref 3.5–5.3)
POTASSIUM SERPL-SCNC: 3.8 MMOL/L (ref 3.5–5.3)
POTASSIUM SERPL-SCNC: 3.8 MMOL/L (ref 3.5–5.3)
POTASSIUM SERPL-SCNC: 3.9 MMOL/L (ref 3.5–5.3)
POTASSIUM SERPL-SCNC: 3.9 MMOL/L (ref 3.5–5.3)
POTASSIUM SERPL-SCNC: 4 MMOL/L (ref 3.5–5.3)
PRESSURE CONTROL: 12
RBC # BLD AUTO: 3.34 MILLION/UL (ref 3.81–5.12)
RBC MORPH BLD: PRESENT
SODIUM SERPL-SCNC: 144 MMOL/L (ref 135–147)
SODIUM SERPL-SCNC: 146 MMOL/L (ref 135–147)
SODIUM SERPL-SCNC: 147 MMOL/L (ref 135–147)
SPECIMEN SOURCE: ABNORMAL
TRIGL SERPL-MCNC: 270 MG/DL
VENT AC: 14
VENT- AC: AC
WBC # BLD AUTO: 10.3 THOUSAND/UL (ref 4.31–10.16)

## 2024-02-24 PROCEDURE — 80048 BASIC METABOLIC PNL TOTAL CA: CPT | Performed by: STUDENT IN AN ORGANIZED HEALTH CARE EDUCATION/TRAINING PROGRAM

## 2024-02-24 PROCEDURE — 99233 SBSQ HOSP IP/OBS HIGH 50: CPT | Performed by: INTERNAL MEDICINE

## 2024-02-24 PROCEDURE — 84478 ASSAY OF TRIGLYCERIDES: CPT | Performed by: STUDENT IN AN ORGANIZED HEALTH CARE EDUCATION/TRAINING PROGRAM

## 2024-02-24 PROCEDURE — 94003 VENT MGMT INPAT SUBQ DAY: CPT

## 2024-02-24 PROCEDURE — 99291 CRITICAL CARE FIRST HOUR: CPT | Performed by: INTERNAL MEDICINE

## 2024-02-24 PROCEDURE — 83605 ASSAY OF LACTIC ACID: CPT | Performed by: STUDENT IN AN ORGANIZED HEALTH CARE EDUCATION/TRAINING PROGRAM

## 2024-02-24 PROCEDURE — 74177 CT ABD & PELVIS W/CONTRAST: CPT

## 2024-02-24 PROCEDURE — 83735 ASSAY OF MAGNESIUM: CPT | Performed by: STUDENT IN AN ORGANIZED HEALTH CARE EDUCATION/TRAINING PROGRAM

## 2024-02-24 PROCEDURE — 94760 N-INVAS EAR/PLS OXIMETRY 1: CPT

## 2024-02-24 PROCEDURE — 82805 BLOOD GASES W/O2 SATURATION: CPT | Performed by: STUDENT IN AN ORGANIZED HEALTH CARE EDUCATION/TRAINING PROGRAM

## 2024-02-24 PROCEDURE — 83930 ASSAY OF BLOOD OSMOLALITY: CPT | Performed by: STUDENT IN AN ORGANIZED HEALTH CARE EDUCATION/TRAINING PROGRAM

## 2024-02-24 PROCEDURE — 82948 REAGENT STRIP/BLOOD GLUCOSE: CPT

## 2024-02-24 PROCEDURE — 85027 COMPLETE CBC AUTOMATED: CPT | Performed by: STUDENT IN AN ORGANIZED HEALTH CARE EDUCATION/TRAINING PROGRAM

## 2024-02-24 PROCEDURE — NC001 PR NO CHARGE: Performed by: INTERNAL MEDICINE

## 2024-02-24 PROCEDURE — 71275 CT ANGIOGRAPHY CHEST: CPT

## 2024-02-24 PROCEDURE — 94640 AIRWAY INHALATION TREATMENT: CPT

## 2024-02-24 RX ORDER — INSULIN GLARGINE 100 [IU]/ML
10 INJECTION, SOLUTION SUBCUTANEOUS ONCE
Status: COMPLETED | OUTPATIENT
Start: 2024-02-24 | End: 2024-02-24

## 2024-02-24 RX ORDER — VECURONIUM BROMIDE 1 MG/ML
10 INJECTION, POWDER, LYOPHILIZED, FOR SOLUTION INTRAVENOUS 2 TIMES DAILY PRN
Status: DISCONTINUED | OUTPATIENT
Start: 2024-02-24 | End: 2024-02-25

## 2024-02-24 RX ORDER — GABAPENTIN 100 MG/1
200 CAPSULE ORAL 3 TIMES DAILY
Status: DISCONTINUED | OUTPATIENT
Start: 2024-02-24 | End: 2024-02-25

## 2024-02-24 RX ORDER — METOCLOPRAMIDE HYDROCHLORIDE 5 MG/ML
5 INJECTION INTRAMUSCULAR; INTRAVENOUS EVERY 8 HOURS
Status: DISCONTINUED | OUTPATIENT
Start: 2024-02-24 | End: 2024-02-29

## 2024-02-24 RX ORDER — METHYLPREDNISOLONE SODIUM SUCCINATE 40 MG/ML
40 INJECTION, POWDER, LYOPHILIZED, FOR SOLUTION INTRAMUSCULAR; INTRAVENOUS 2 TIMES DAILY
Status: DISCONTINUED | OUTPATIENT
Start: 2024-02-24 | End: 2024-02-25

## 2024-02-24 RX ORDER — ENOXAPARIN SODIUM 100 MG/ML
40 INJECTION SUBCUTANEOUS
Status: DISCONTINUED | OUTPATIENT
Start: 2024-02-24 | End: 2024-03-11

## 2024-02-24 RX ORDER — METOLAZONE 5 MG/1
5 TABLET ORAL ONCE
Status: COMPLETED | OUTPATIENT
Start: 2024-02-24 | End: 2024-02-24

## 2024-02-24 RX ORDER — VECURONIUM BROMIDE 1 MG/ML
10 INJECTION, POWDER, LYOPHILIZED, FOR SOLUTION INTRAVENOUS ONCE
Status: DISCONTINUED | OUTPATIENT
Start: 2024-02-24 | End: 2024-02-24

## 2024-02-24 RX ORDER — INSULIN GLARGINE 100 [IU]/ML
10 INJECTION, SOLUTION SUBCUTANEOUS
Status: DISCONTINUED | OUTPATIENT
Start: 2024-02-25 | End: 2024-02-24

## 2024-02-24 RX ORDER — FUROSEMIDE 10 MG/ML
40 INJECTION INTRAMUSCULAR; INTRAVENOUS ONCE
Status: COMPLETED | OUTPATIENT
Start: 2024-02-24 | End: 2024-02-24

## 2024-02-24 RX ORDER — ATROPINE SULFATE 0.1 MG/ML
INJECTION INTRAVENOUS
Status: DISCONTINUED
Start: 2024-02-24 | End: 2024-02-24 | Stop reason: WASHOUT

## 2024-02-24 RX ORDER — INSULIN LISPRO 100 [IU]/ML
1-6 INJECTION, SOLUTION INTRAVENOUS; SUBCUTANEOUS EVERY 6 HOURS SCHEDULED
Status: DISCONTINUED | OUTPATIENT
Start: 2024-02-24 | End: 2024-02-24

## 2024-02-24 RX ORDER — METHYLPREDNISOLONE SODIUM SUCCINATE 40 MG/ML
40 INJECTION, POWDER, LYOPHILIZED, FOR SOLUTION INTRAMUSCULAR; INTRAVENOUS DAILY
Status: DISCONTINUED | OUTPATIENT
Start: 2024-02-25 | End: 2024-02-24

## 2024-02-24 RX ADMIN — METOLAZONE 5 MG: 5 TABLET ORAL at 09:41

## 2024-02-24 RX ADMIN — REMDESIVIR 100 MG: 100 INJECTION, POWDER, LYOPHILIZED, FOR SOLUTION INTRAVENOUS at 13:59

## 2024-02-24 RX ADMIN — METOCLOPRAMIDE HYDROCHLORIDE 5 MG: 5 INJECTION INTRAMUSCULAR; INTRAVENOUS at 13:58

## 2024-02-24 RX ADMIN — PROPOFOL 40 MCG/KG/MIN: 10 INJECTION, EMULSION INTRAVENOUS at 00:09

## 2024-02-24 RX ADMIN — CHLORHEXIDINE GLUCONATE 0.12% ORAL RINSE 15 ML: 1.2 LIQUID ORAL at 20:07

## 2024-02-24 RX ADMIN — LEVALBUTEROL HYDROCHLORIDE 1.25 MG: 1.25 SOLUTION RESPIRATORY (INHALATION) at 15:50

## 2024-02-24 RX ADMIN — GABAPENTIN 100 MG: 100 CAPSULE ORAL at 08:27

## 2024-02-24 RX ADMIN — IPRATROPIUM BROMIDE 0.5 MG: 0.5 SOLUTION RESPIRATORY (INHALATION) at 08:56

## 2024-02-24 RX ADMIN — INSULIN GLARGINE 10 UNITS: 100 INJECTION, SOLUTION SUBCUTANEOUS at 15:25

## 2024-02-24 RX ADMIN — INSULIN LISPRO 5 UNITS: 100 INJECTION, SOLUTION INTRAVENOUS; SUBCUTANEOUS at 17:31

## 2024-02-24 RX ADMIN — FUROSEMIDE 40 MG: 10 INJECTION, SOLUTION INTRAMUSCULAR; INTRAVENOUS at 09:45

## 2024-02-24 RX ADMIN — SODIUM CHLORIDE 5 UNITS/HR: 9 INJECTION, SOLUTION INTRAVENOUS at 18:40

## 2024-02-24 RX ADMIN — PROPOFOL 45 MCG/KG/MIN: 10 INJECTION, EMULSION INTRAVENOUS at 05:59

## 2024-02-24 RX ADMIN — IPRATROPIUM BROMIDE 0.5 MG: 0.5 SOLUTION RESPIRATORY (INHALATION) at 20:56

## 2024-02-24 RX ADMIN — METHYLPREDNISOLONE SODIUM SUCCINATE 40 MG: 40 INJECTION, POWDER, FOR SOLUTION INTRAMUSCULAR; INTRAVENOUS at 20:07

## 2024-02-24 RX ADMIN — IOHEXOL 100 ML: 350 INJECTION, SOLUTION INTRAVENOUS at 12:27

## 2024-02-24 RX ADMIN — ENOXAPARIN SODIUM 40 MG: 40 INJECTION SUBCUTANEOUS at 08:27

## 2024-02-24 RX ADMIN — CHLORHEXIDINE GLUCONATE 0.12% ORAL RINSE 15 ML: 1.2 LIQUID ORAL at 08:27

## 2024-02-24 RX ADMIN — METHYLPREDNISOLONE SODIUM SUCCINATE 40 MG: 40 INJECTION, POWDER, FOR SOLUTION INTRAMUSCULAR; INTRAVENOUS at 08:27

## 2024-02-24 RX ADMIN — HYDROMORPHONE HYDROCHLORIDE 0.5 MG: 1 INJECTION, SOLUTION INTRAMUSCULAR; INTRAVENOUS; SUBCUTANEOUS at 08:27

## 2024-02-24 RX ADMIN — NYSTATIN: 100000 POWDER TOPICAL at 17:24

## 2024-02-24 RX ADMIN — DEXMEDETOMIDINE HYDROCHLORIDE 1.2 MCG/KG/HR: 4 INJECTION, SOLUTION INTRAVENOUS at 19:36

## 2024-02-24 RX ADMIN — IPRATROPIUM BROMIDE 0.5 MG: 0.5 SOLUTION RESPIRATORY (INHALATION) at 15:50

## 2024-02-24 RX ADMIN — DEXMEDETOMIDINE HYDROCHLORIDE 1.2 MCG/KG/HR: 4 INJECTION, SOLUTION INTRAVENOUS at 22:27

## 2024-02-24 RX ADMIN — PROPOFOL 45 MCG/KG/MIN: 10 INJECTION, EMULSION INTRAVENOUS at 17:22

## 2024-02-24 RX ADMIN — LEVALBUTEROL HYDROCHLORIDE 1.25 MG: 1.25 SOLUTION RESPIRATORY (INHALATION) at 08:56

## 2024-02-24 RX ADMIN — HYDROMORPHONE HYDROCHLORIDE 0.5 MG: 1 INJECTION, SOLUTION INTRAMUSCULAR; INTRAVENOUS; SUBCUTANEOUS at 17:22

## 2024-02-24 RX ADMIN — NYSTATIN: 100000 POWDER TOPICAL at 09:45

## 2024-02-24 RX ADMIN — DEXMEDETOMIDINE HYDROCHLORIDE 1.2 MCG/KG/HR: 4 INJECTION, SOLUTION INTRAVENOUS at 15:25

## 2024-02-24 RX ADMIN — PROPOFOL 50 MCG/KG/MIN: 10 INJECTION, EMULSION INTRAVENOUS at 22:07

## 2024-02-24 RX ADMIN — VECURONIUM BROMIDE 10 MG: 10 INJECTION, POWDER, LYOPHILIZED, FOR SOLUTION INTRAVENOUS at 12:00

## 2024-02-24 RX ADMIN — DEXMEDETOMIDINE HYDROCHLORIDE 0.9 MCG/KG/HR: 4 INJECTION, SOLUTION INTRAVENOUS at 04:22

## 2024-02-24 RX ADMIN — PROPOFOL 45 MCG/KG/MIN: 10 INJECTION, EMULSION INTRAVENOUS at 15:25

## 2024-02-24 RX ADMIN — HEPARIN SODIUM 5000 UNITS: 5000 INJECTION INTRAVENOUS; SUBCUTANEOUS at 05:59

## 2024-02-24 RX ADMIN — DEXMEDETOMIDINE HYDROCHLORIDE 1.2 MCG/KG/HR: 4 INJECTION, SOLUTION INTRAVENOUS at 09:28

## 2024-02-24 RX ADMIN — PROPOFOL 40 MCG/KG/MIN: 10 INJECTION, EMULSION INTRAVENOUS at 09:41

## 2024-02-24 RX ADMIN — METOCLOPRAMIDE HYDROCHLORIDE 5 MG: 5 INJECTION INTRAMUSCULAR; INTRAVENOUS at 20:07

## 2024-02-24 RX ADMIN — LEVOFLOXACIN 750 MG: 750 INJECTION, SOLUTION INTRAVENOUS at 12:57

## 2024-02-24 RX ADMIN — LEVALBUTEROL HYDROCHLORIDE 1.25 MG: 1.25 SOLUTION RESPIRATORY (INHALATION) at 20:56

## 2024-02-24 RX ADMIN — HYDROMORPHONE HYDROCHLORIDE 0.5 MG: 1 INJECTION, SOLUTION INTRAMUSCULAR; INTRAVENOUS; SUBCUTANEOUS at 04:27

## 2024-02-24 NOTE — RESPIRATORY THERAPY NOTE
RT Ventilator Management Note  Carla Hunt 59 y.o. female MRN: [id number]  Unit/Bed#: [facility] 10 Encounter: [id number]      Daily Screen         2/23/2024  0849 2/24/2024  0856          Patient safety screen outcome:: Passed Failed      Not Ready for Weaning due to:: -- Underline problem not resolved                Physical Exam:   Assessment Type: During-treatment  General Appearance: Sedated  Respiratory Pattern: Assisted  Chest Assessment: Chest expansion symmetrical  Bilateral Breath Sounds: Coarse  Cough: None  Suction: ET Tube  O2 Device: G5      Resp Comments: Pt switched to ASV

## 2024-02-24 NOTE — PROGRESS NOTES
[facility]  Progress Note: Critical Care  Name: Carla Hunt 59 y.o. female I MRN: [id number]  Unit/Bed#: MICU 10 I Date of Admission: 1/10/2024   Date of Service: 2/24/2024 I Hospital Day: 45    Assessment/Plan     Acute hypoxic respiratory failure due to severe covid/covid induced pneumonitis  Partial cecal volvulus s/p right hemicolectomy  Stenotrophomonas pneumonia  Steroid induced hyperglycemia  Anemia  Thrombocytopenia  B cell lymphoma  History of small SAH  CKD stage III  Hypertriglyceridemia  Sacral ulcer, unstageable  Seizure disorder  Anxiety     Neuro:   Sedation: Off propofol, Off versed, On precedex drip 1.2, propofol at 45.  -Aim to maintain RASS 0, currently between RASS 0 to -1   -Repeat triglyceride level at 305 from 02/20, repeat triglyceride check from this morning pending  -Plan to wean sedation to assist with our plan for possibility of SBT/extubation     Diagnosis: seizure disorder  -S/p partial left temporal lobe resection in 2007  -Contuinue home lamictal 150 bid and topamax 100 bid     Diagnosis: Anxiety  -On venlafaxine 25 mg TID via NGT  -On zyprexa 5 mg qHS     CV:   Diagnosis: Distributive shock, improving with systolics in the 110-130s mmHg  -On levophed at 1     Pulm:  Diagnosis: Acute hypoxic respiratory failure due to severe covid  and covid induced fibrosis  -Tested COVID positive on 1/7  -Completed severe COVID pathway and 7 days CTX  -Tenuous respiratory status requiring multiple re-admissions to MICU  -S/p Bronch 2/2 and 02/16  -NG on cultures (initially showed non-group A strep)  -PCP and AFB negative   -Legionella, viral, fungal cultures no growth to date  -Pulse dose steroids with solumedrol 1g daily x3 days (completed 2/7) then transitioned to prednisone 80mg daily on 2/8  -Off veletri  -Completed IVIG for 5 days  -CRP trend 221 (02/19) > 116.9 (02/20) > 94.2 (02/20) > 335.8 (02/21). Limited benefit in trending CRP at this time  since it maybe elevated due to multiple other factors including recent volvulus and surgery. Will plan to continue with current solumedrol dosing and monitor patients respiratory status clinical and aim to wean vent FiO2 as tolerated  -Solumedrol 40 mg Q12H, can wean to 30 Q12H  -Vent settings: PCMV RR14, 12/8, FiO2 70%  -Wean FiO2 as able so that we can move towards SBT  -Patient noted to be wheezing this morning, on Xopenex     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  -Monitor output of ostomy pouch and Hemoglobin  -Now on tube feeds     -On famotidine 20 mg for GI prophylaxis      :   Diagnosis: CKD III  -Baseline Cr appears to be 0.8-1.2  -UA unremarkable   -Upon chart review 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 It may also be dilutional since she has been on IV fluids for past 24 hours  -Monitor sites for bleeding    Diagnosis: B cell lymphoma  -Follows with heme/onc at [facility]  -On rituxan for 2 year course, started May 2022  -Last dose was 12/20, treatment currently on hold   -Leukopenic on admission but WBC now wnl     DVT ppx with lovenox     Endo:   Diagnosis: steroid hyperglycemia and diabetes mellitus type 2, A1c at 6.6 from 01/2024  -Goal -180  -BG range last 24hrs 129-178  -On insulin drip     ID:   Diagnosis: Severe covid pneumonia and stenotrophomonas pneumonia  -Completed severe COVID pathway with decadron (ended  1/22) and remdesivir (ended 1/20), also completed 7 days CTX on 1/15  -C/f opportunistic infections given immunocompromised status on chemotherapy and recent steroid use  -No consolidations on CXR and procal negative  -S/p bactrim and minocycline x5 days for stenotrophomonas on sputum culture (1/25), then on bactrim for PJP ppx  -Bronc on 2/2 - Cx w/o growth (initially resulted as 1+ alpha hemolytic strep), AFB & PCP negative, f/u fungal, legionella, and viral cultures   -2/10 pt again had escalating O2 requirements on floors necessitating readmission to ICU  -Concern that with recent pulse dose steroids and now worsening respiratory status she could have infection, possibly opportunistic   -UA with moderate leukocytes, nitrite negative, 30-50 WBC, trace protein.  -Repeat sputum culture 2/9 with 4+ stenotrophomonas  -Bronch cultures with candida albicans, Rare gram positive cocci in pairs  -Was on high dose bactrim, received 10 days of this. Was also on vancomycin and cefepime. As per ID recommendations from 02/221, now off all three and switched to levaquin 750 mg IV ever 48 hours, Plan to continue for 5 days.  -As per ID recommendations, on remdesevir for 5 days  -On levaquin 750 mg, will continue for an additional 2 days     MSK/Skin:   -Wound care team following for bilateral sacral wounds      Disposition: Critical care    ICU Core Measures     Vented Patient  VAP Bundle  VAP bundle ordered     A: Assess, Prevent, and Manage Pain Has pain been assessed? Yes  Need for changes to pain regimen? No   B: Both Spontaneous Awakening Trials (SATs) and Spontaneous Breathing Trials (SBTs) Plan to perform spontaneous awakening trial today? Yes   Plan to perform spontaneous breathing trial today? Yes   Obvious barriers to extubation? No   C: Choice of Sedation RASS Goal: -1 Drowsy  Need for changes to sedation or analgesia regimen? Yes   D: Delirium CAM-ICU: Negative   E: Early Mobility  Plan for early mobility? Yes   F:  Family Engagement Plan for family engagement today? Yes       Antibiotic Review: Patient on appropriate coverage based on culture data.     Review of Invasive Devices:    Ortiz Plan: Continue for accurate I/O monitoring for 48 hours  Central access plan: Medications requiring central line  Amelia Plan: Keep arterial line for frequent ABGs    Prophylaxis:  VTE VTE covered by:  enoxaparin, Subcutaneous       Stress Ulcer  not ordered        Significant 24hr Events     24hr events: Overnight, patient saturating around 94-95 and maintained on same FiO2 settings.      Subjective   Patient seen by bedside, will reach out to patients family to discuss medical updates and medical treatment plan.     Review of Systems   Unable to perform ROS: Patient nonverbal        Objective                            Vitals I/O      Most Recent Min/Max in 24hrs   Temp 99.3 °F (37.4 °C) Temp  Min: 96.8 °F (36 °C)  Max: 99.3 °F (37.4 °C)   Pulse 104 Pulse  Min: 60  Max: 127   Resp (!) 30 Resp  Min: 15  Max: 57   /73 BP  Min: 84/51  Max: 171/77   O2 Sat 98 % SpO2  Min: 86 %  Max: 100 %   On Precedex drip at 1.2, On propofol drip at 45, On levo drip at 1, On insulin drip at 4.   Tube feeds running at 30 mL/hr.   Intake/Output Summary (Last 24 hours) at 2/24/2024 0723  Last data filed at 2/24/2024 0601  Gross per 24 hour   Intake 4480.67 ml   Output 3705 ml   Net 775.67 ml       Diet Enteral/Parenteral; Tube Feeding No Oral Diet; Glucerna 1.2; Continuous; 35; 300; Water; Every 4 hours    Invasive Monitoring   Arterial Line  [place] /65  Arterial Line BP  Min: 75/40  Max: 198/79   MAP 85 mmHg  Arterial Line MAP (mmHg)  Min: 50 mmHg  Max: 116 mmHg           Physical Exam   Physical Exam  Vitals reviewed.   Skin:     General: Skin is warm.      Capillary Refill: Capillary refill takes less than 2 seconds.   HENT:      Head: Normocephalic.   Cardiovascular:      Rate and Rhythm: Normal rate and regular rhythm.      Pulses: Normal pulses.    Musculoskeletal:      Comments: 1+ edema in both hands and lower extremities noted   Abdominal: General: Bowel sounds are normal.      Palpations: Abdomen is soft.   Constitutional:       Appearance: She is ill-appearing.      Interventions: She is sedated and intubated.      Comments: Patient has a CVC, left sided arterial line. Ortiz in place. ETT and NGT in place. 1x PIV.    Pulmonary:      Effort: Pulmonary effort is normal. She is intubated.      Comments: Wheezing heard  Neurological:      Comments: Patient appears to be sedated, unable to follow commands at this time.    Genitourinary/Anorectal:  Ortiz present.          Diagnostic Studies      EKG: Reviewed  Imaging: Reviewed I have personally reviewed pertinent reports.       Medications:  Scheduled PRN   chlorhexidine, 15 mL, Q12H SARTHAK  enoxaparin, 40 mg, Q24H SARTHAK  gabapentin, 100 mg, TID  ipratropium, 0.5 mg, TID  levalbuterol, 1.25 mg, TID  levofloxacin, 750 mg, Q24H  methylPREDNISolone sodium succinate, 40 mg, Q12H SARTHAK  midazolam, 4 mg, Once  nystatin, , BID  OLANZapine, 5 mg, HS  remdesivir, 100 mg, Q24H      acetaminophen, 1,000 mg, Q8H PRN  hydrALAZINE, 10 mg, Q6H PRN  HYDROmorphone, 0.5 mg, Q1H PRN  midazolam, 2 mg, Q4H PRN  ondansetron, 4 mg, Q6H PRN       Continuous    dexmedetomidine, 0.1-1.2 mcg/kg/hr, Last Rate: 1.2 mcg/kg/hr (02/24/24 0429)  HYDROmorphone, 1 mg/hr, Last Rate: Stopped (02/23/24 0822)  insulin regular (HumuLIN R,NovoLIN R) 1 Units/mL in sodium chloride 0.9 % 100 mL infusion, 0.3-21 Units/hr, Last Rate: 4 Units/hr (02/24/24 0601)  midazolam, 2 mg/hr, Last Rate: Stopped (02/22/24 1159)  norepinephrine, 1-30 mcg/min, Last Rate: 1 mcg/min (02/24/24 0453)  propofol, 5-50 mcg/kg/min, Last Rate: 45 mcg/kg/min (02/24/24 0601)         Labs:    CBC    Recent Labs     02/23/24  0602 02/24/24  0413   WBC 10.61* 10.30*   HGB 9.3* 10.0*   HCT 29.1* 29.2*   PLT  --  98*     BMP    Recent Labs     02/24/24  0002 02/24/24  0413   SODIUM 146 146    K 3.8 3.9   * 110*   CO2 28 30   AGAP 9 6   BUN 43* 43*   CREATININE 0.70 0.70   CALCIUM 10.5* 10.5*       Coags    No recent results     Additional Electrolytes  Recent Labs     02/23/24  0602 02/24/24  0413   MG 1.9 1.6*          Blood Gas    Recent Labs     02/23/24  0603   PHART 7.364   [id number] 45.7*   PO2ART 119.9   [id number] 25.5   BEART -0.1   SOURCE Line, Arterial     Recent Labs     02/23/24  0603   SOURCE Line, Arterial    LFTs  No recent results    Infectious  No recent results  Glucose  Recent Labs     02/23/24  1151 02/23/24  2032 02/24/24  0002 02/24/24  0413   GLUC 261* 168* 133 142*               Miguel Whittaker MD

## 2024-02-24 NOTE — PROGRESS NOTES
Before sending patient to the CT scan she became hypoxic and could not tolerate the portable ventilator so we decided to give vecuronium 10 mg.  After that also she became bradycardic in the mid 50s, this happened before on multiple occasions so I stopped the Precedex and we sent her to the CT scan with another dose of vecuronium and atropine and resident accompanied her to the CT scan.  I reviewed the chest CT scan and it showed in general improvement of areas of consolidation and some areas of groundglass opacification on the lungs, still with significant groundglass opacification especially in the lower lobes.  Bilateral dependent consolidation in the lower lobes as well.  No evidence of PE  Her stomach was significantly dilated and full with tube feeds material, bilateral renal cysts as before with no evidence of significant hydronephrosis, bladder is empty with Ortiz balloon within.    Plan:  stop the tube feeds and start OG tube to suction  Will start Reglan 5 mg 3 times daily for now for motility  Monitor QTc daily  DC Zyprexa  Increase gabapentin to 200 mg 3 times daily  increase Solu-Medrol again to 40 mg IV twice daily  Continue current management as described and critical care note from this morning  Awaiting official reading of CT scan    Added critical care time 15 min

## 2024-02-24 NOTE — PROGRESS NOTES
Progress Note - Infectious Disease   Carla Hunt 59 y.o. female MRN: [id number]  [facility]: [id number]      Impression/Recommendations:  Possible bacterial pneumonia.  Initially, on admission early January, secondary to mostly COVID but there was concern for concurrent bacterial pneumonia on admission due to elevated procalcitonin. Patient completed treatment course for both COVID and bacterial pneumonia.  She was clinically improved with decreasing O2 requirement.  However, patient deteriorated in late January, requiring to be placed back on high flow O2 support.  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  Procalcitonin x 3 were in the normal range.  Patient has no fever or leukocytosis.  She improved with increasing steroid dose.  She completed a 5-day course of Bactrim/minocycline for presumptive stenotrophomonas respiratory infection, with expectorated sputum growing stenotrophomonas.  She subsequently had bronchoscopy, with BAL culture negative for bacterial growth, AFB and PCP but completed the course of Bactrim/minocycline prior to bronchoscopy.  Patient improved and O2 was being weaned again until 2 weeks ago, when she deteriorated again.  She is now back in ICU.  Expectorated sputum once again is growing stenotrophomonas.  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  Not sure that the stenotrophomonas that is growing again and expectorated sputum is pathogenic versus upper airway colonization.  Patient was restarted on high-dose steroid and Bactrim.  Patient's respiratory status remained tenuous throughout, required intubation subsequently.  She had bronchoscopy on 2/16 with moderate secretion.  BAL culture had no growth but COVID PCR was positive.  Bactrim was changed to Levaquin over weekend due to ELIESER.  Remdesivir was restarted for possible COVID relapse.  Due to worsening consolidations on CT,  patient is status post repeat bronchoscopy on 2/21, with some purulent secretion.  BAL culture with no bacterial growth thus far.  Growth of Candida is most likely airway colonization in patient on antibiotic.  Given purulent secretion and improvement on Levaquin, will have patient complete Levaquin course.  Continue Levaquin.  Treat x 7 days total Levaquin, through tomorrow.  Continue systemic corticosteroid, with weaning, per critical care service  Monitor temperature/WBC.  Follow-up on final BAL culture.      Severe COVID, present on admission.  Patient was treated with remdesivir, dexamethasone and was given 1 dose of Tocilizumab on admission.  She also had a course of antibiotic initially for presumptive bacterial superinfection.  COVID antigen was negative prior to coming off isolation.  Current chest CT is showing extensive fibrotic changes.  Persistently positive COVID PCR in BAL noted.  This may be all prolonged shedding.  However, given immunosuppressed state and lack of response to antibiotic and high-dose steroid thus far, will treat patient with another course of remdesivir.  Completed another course of remdesivir x 5 days through tomorrow.     Acute hypoxic respiratory failure, likely multifactorial, with both COVID and bacterial ammonia contributing, with patient back on ventilator last week.  She remains on ventilator with oxygenation and ventilatory support slowly improving.  Ventilator support and weaning per critical care medicine service.     CKD.  Creatinine worsened on Bactrim.  Patient is now off Bactrim.  Creatinine has improved.  Monitor creatinine.     5. 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  Patient is status post exploratory laparotomy with ileocecectomy and end ileostomy creation.  Surgery follow-up.     B-cell lymphoma, on chemotherapy, which is currently postponed.  Patient was leukopenic on admission but resolved.  Monitor WBC/ANC.     Discussed with patient's  and  multiple other family members in detail regarding the above plan.  Discussed with Dr. Whittaker from critical care medicine service regarding continued slow improvement and continuation of Levaquin and remdesivir course another 24 hours, as in above.  He is in agreement.       Antibiotics:  Levaquin # 6  Antibiotic # 15  Remdesivir # 4     Subjective:  Patient remains intubated.  She remains sedated but has opens eyes to verbal stimuli  Temperature stays down.  No chills.  She is tolerating antibiotic well.  No nausea, vomiting or diarrhea.    Objective:  Vitals:  Temp:  [97.5 °F (36.4 °C)-99.7 °F (37.6 °C)] 99.7 °F (37.6 °C)  HR:  [] 99  Resp:  [15-57] 27  BP: ()/(51-89) 106/62  SpO2:  [86 %-100 %] 92 %  Temp (24hrs), Av.3 °F (36.8 °C), Min:97.5 °F (36.4 °C), Max:99.7 °F (37.6 °C)  Current: Temperature: 99.7 °F (37.6 °C)    Physical Exam:     General: Sedated on ventilator, opens eyes to verbal stimuli, comfortable, nontoxic.   Neck:  Supple. No mass.  No lymphadenopathy.   Lungs: Expansion symmetric, diffuse rhonchi, no rales, no wheezing.   Heart:  Regular rate and rhythm, S1 and S2 normal, no murmur.   Abdomen: Soft, nondistended, non-tender, bowel sounds active all four quadrants, no masses, no organomegaly.   Extremities: No edema. No erythema/warmth. No ulcer. Nontender to palpation.   Skin:  No rash.   Neuro: Not assessable.     Invasive Devices       Central Venous Catheter Line  Duration             CVC Central Lines 24 3 days              Peripheral Intravenous Line  Duration             Peripheral IV 24 Right Antecubital 1 day              Arterial Line  Duration             Arterial Line 24 Radial 10 days              Drain  Duration             Ileostomy RUQ 3 days    NG/OG/Enteral Tube Nasogastric 18 Fr Left nare 3 days    Urethral Catheter Latex 16 Fr. 3 days              Airway  Duration             ETT  Cuffed 7.5 mm 10 days                    Labs studies:   I have  personally reviewed pertinent labs.  Results from last 7 days   Lab Units 02/24/24  0817 02/24/24  0413 02/24/24  0002 02/20/24  1759 02/20/24  1523 02/20/24  1515 02/20/24  1412 02/20/24  1412 02/20/24  1010   POTASSIUM mmol/L 3.9 3.9 3.8   < >  --   --   --   --  6.1*   CHLORIDE mmol/L 110* 110* 109*   < >  --   --   --   --  113*   CO2 mmol/L 30 30 28   < >  --   --   --   --  27   CO2, I-STAT mmol/L  --   --   --   --  23 24   < > 31  --    BUN mg/dL 42* 43* 43*   < >  --   --   --   --  95*   CREATININE mg/dL 0.67 0.70 0.70   < >  --   --   --   --  1.62*   EGFR ml/min/1.73sq m 97 95 95   < >  --   --   --   --  34   GLUCOSE, ISTAT mg/dl  --   --   --   --  195* 209*  --  128  --    CALCIUM mg/dL 10.6* 10.5* 10.5*   < >  --   --   --   --  11.8*   AST U/L  --   --   --   --   --   --   --   --  20   ALT U/L  --   --   --   --   --   --   --   --  18   ALK PHOS U/L  --   --   --   --   --   --   --   --  68    < > = values in this interval not displayed.     Results from last 7 days   Lab Units 02/24/24  0413 02/23/24  0602 02/22/24  0436 02/21/24  1533 02/21/24  0553   WBC Thousand/uL 10.30* 10.61* 23.58*  --  13.78*   HEMOGLOBIN g/dL 10.0* 9.3* 11.4*   < > 7.2*   PLATELETS Thousands/uL 98*  --  237  --  196    < > = values in this interval not displayed.     Results from last 7 days   Lab Units 02/21/24  1713 02/21/24  1710 02/21/24  1704 02/18/24  0926 02/17/24  1818   BLOOD CULTURE   --   --   --   --  No Growth After 5 Days.  No Growth After 5 Days.   GRAM STAIN RESULT  2+ Polys*  1+ Yeast* Rare Polys  No bacteria seen Rare Polys  No bacteria seen  --   --    MRSA CULTURE ONLY   --   --   --  No Methicillin Resistant Staphlyococcus aureus (MRSA) isolated  --        Imaging Studies:   I have personally reviewed pertinent imaging study reports and images in PACS.    EKG, Pathology, and Other Studies:   I have personally reviewed pertinent reports.

## 2024-02-25 ENCOUNTER — APPOINTMENT (INPATIENT)
Dept: RADIOLOGY | Facility: HOSPITAL | Age: 59
DRG: 003 | End: 2024-02-25
Payer: COMMERCIAL

## 2024-02-25 VITALS
RESPIRATION RATE: 25 BRPM | SYSTOLIC BLOOD PRESSURE: 123 MMHG | WEIGHT: 160.05 LBS | TEMPERATURE: 99.1 F | HEART RATE: 151 BPM | BODY MASS INDEX: 24.26 KG/M2 | DIASTOLIC BLOOD PRESSURE: 83 MMHG | OXYGEN SATURATION: 88 % | HEIGHT: 68 IN

## 2024-02-25 LAB
ALBUMIN SERPL BCP-MCNC: 2.4 G/DL (ref 3.5–5)
ALP SERPL-CCNC: 68 U/L (ref 34–104)
ALT SERPL W P-5'-P-CCNC: 17 U/L (ref 7–52)
ANION GAP SERPL CALCULATED.3IONS-SCNC: 6 MMOL/L
ANION GAP SERPL CALCULATED.3IONS-SCNC: 7 MMOL/L
ANION GAP SERPL CALCULATED.3IONS-SCNC: 8 MMOL/L
ANION GAP SERPL CALCULATED.3IONS-SCNC: 9 MMOL/L
ANION GAP SERPL CALCULATED.3IONS-SCNC: 9 MMOL/L
ANISOCYTOSIS BLD QL SMEAR: PRESENT
AST SERPL W P-5'-P-CCNC: 10 U/L (ref 13–39)
ATRIAL RATE: 163 BPM
ATRIAL RATE: 88 BPM
BACTERIA BRONCH AEROBE CULT: ABNORMAL
BASE EXCESS BLDA CALC-SCNC: 5.8 MMOL/L
BASE EXCESS BLDA CALC-SCNC: 9 MMOL/L (ref -2–3)
BASOPHILS # BLD MANUAL: 0 THOUSAND/UL (ref 0–0.1)
BASOPHILS NFR MAR MANUAL: 0 % (ref 0–1)
BILIRUB SERPL-MCNC: 0.7 MG/DL (ref 0.2–1)
BUN SERPL-MCNC: 31 MG/DL (ref 5–25)
BUN SERPL-MCNC: 32 MG/DL (ref 5–25)
BUN SERPL-MCNC: 32 MG/DL (ref 5–25)
BUN SERPL-MCNC: 34 MG/DL (ref 5–25)
BUN SERPL-MCNC: 36 MG/DL (ref 5–25)
BUN SERPL-MCNC: 37 MG/DL (ref 5–25)
BUN SERPL-MCNC: 37 MG/DL (ref 5–25)
CA-I BLD-SCNC: 1.31 MMOL/L (ref 1.12–1.32)
CA-I BLD-SCNC: 1.39 MMOL/L (ref 1.12–1.32)
CALCIUM ALBUM COR SERPL-MCNC: 11.2 MG/DL (ref 8.3–10.1)
CALCIUM SERPL-MCNC: 10 MG/DL (ref 8.4–10.2)
CALCIUM SERPL-MCNC: 10.1 MG/DL (ref 8.4–10.2)
CALCIUM SERPL-MCNC: 10.2 MG/DL (ref 8.4–10.2)
CALCIUM SERPL-MCNC: 9.8 MG/DL (ref 8.4–10.2)
CALCIUM SERPL-MCNC: 9.8 MG/DL (ref 8.4–10.2)
CALCIUM SERPL-MCNC: 9.9 MG/DL (ref 8.4–10.2)
CALCIUM SERPL-MCNC: 9.9 MG/DL (ref 8.4–10.2)
CARDIAC TROPONIN I PNL SERPL HS: 66 NG/L (ref 8–18)
CHLORIDE SERPL-SCNC: 108 MMOL/L (ref 96–108)
CHLORIDE SERPL-SCNC: 108 MMOL/L (ref 96–108)
CHLORIDE SERPL-SCNC: 109 MMOL/L (ref 96–108)
CHLORIDE SERPL-SCNC: 110 MMOL/L (ref 96–108)
CHLORIDE SERPL-SCNC: 112 MMOL/L (ref 96–108)
CO2 SERPL-SCNC: 32 MMOL/L (ref 21–32)
CO2 SERPL-SCNC: 34 MMOL/L (ref 21–32)
CO2 SERPL-SCNC: 34 MMOL/L (ref 21–32)
CO2 SERPL-SCNC: 39 MMOL/L (ref 21–32)
CREAT SERPL-MCNC: 0.48 MG/DL (ref 0.6–1.3)
CREAT SERPL-MCNC: 0.51 MG/DL (ref 0.6–1.3)
CREAT SERPL-MCNC: 0.53 MG/DL (ref 0.6–1.3)
CREAT SERPL-MCNC: 0.53 MG/DL (ref 0.6–1.3)
CREAT SERPL-MCNC: 0.59 MG/DL (ref 0.6–1.3)
CREAT SERPL-MCNC: 0.59 MG/DL (ref 0.6–1.3)
CREAT SERPL-MCNC: 0.62 MG/DL (ref 0.6–1.3)
D DIMER PPP FEU-MCNC: 1.58 UG/ML FEU
EOSINOPHIL # BLD MANUAL: 0.25 THOUSAND/UL (ref 0–0.4)
EOSINOPHIL NFR BLD MANUAL: 3 % (ref 0–6)
ERYTHROCYTE [DISTWIDTH] IN BLOOD BY AUTOMATED COUNT: 19.8 % (ref 11.6–15.1)
GFR SERPL CREATININE-BSD FRML MDRD: 101 ML/MIN/1.73SQ M
GFR SERPL CREATININE-BSD FRML MDRD: 101 ML/MIN/1.73SQ M
GFR SERPL CREATININE-BSD FRML MDRD: 104 ML/MIN/1.73SQ M
GFR SERPL CREATININE-BSD FRML MDRD: 104 ML/MIN/1.73SQ M
GFR SERPL CREATININE-BSD FRML MDRD: 106 ML/MIN/1.73SQ M
GFR SERPL CREATININE-BSD FRML MDRD: 108 ML/MIN/1.73SQ M
GFR SERPL CREATININE-BSD FRML MDRD: 99 ML/MIN/1.73SQ M
GLUCOSE SERPL-MCNC: 101 MG/DL (ref 65–140)
GLUCOSE SERPL-MCNC: 102 MG/DL (ref 65–140)
GLUCOSE SERPL-MCNC: 102 MG/DL (ref 65–140)
GLUCOSE SERPL-MCNC: 106 MG/DL (ref 65–140)
GLUCOSE SERPL-MCNC: 108 MG/DL (ref 65–140)
GLUCOSE SERPL-MCNC: 115 MG/DL (ref 65–140)
GLUCOSE SERPL-MCNC: 117 MG/DL (ref 65–140)
GLUCOSE SERPL-MCNC: 128 MG/DL (ref 65–140)
GLUCOSE SERPL-MCNC: 133 MG/DL (ref 65–140)
GLUCOSE SERPL-MCNC: 136 MG/DL (ref 65–140)
GLUCOSE SERPL-MCNC: 142 MG/DL (ref 65–140)
GLUCOSE SERPL-MCNC: 149 MG/DL (ref 65–140)
GLUCOSE SERPL-MCNC: 155 MG/DL (ref 65–140)
GLUCOSE SERPL-MCNC: 157 MG/DL (ref 65–140)
GLUCOSE SERPL-MCNC: 170 MG/DL (ref 65–140)
GLUCOSE SERPL-MCNC: 172 MG/DL (ref 65–140)
GLUCOSE SERPL-MCNC: 176 MG/DL (ref 65–140)
GLUCOSE SERPL-MCNC: 85 MG/DL (ref 65–140)
GLUCOSE SERPL-MCNC: 92 MG/DL (ref 65–140)
GLUCOSE SERPL-MCNC: 95 MG/DL (ref 65–140)
GRAM STN SPEC: ABNORMAL
HCO3 BLDA-SCNC: 30.9 MMOL/L (ref 22–28)
HCO3 BLDA-SCNC: 36.3 MMOL/L (ref 22–28)
HCT VFR BLD AUTO: 31.3 % (ref 34.8–46.1)
HCT VFR BLD CALC: 29 % (ref 34.8–46.1)
HGB BLD-MCNC: 10 G/DL (ref 11.5–15.4)
HGB BLDA-MCNC: 9.9 G/DL (ref 11.5–15.4)
LACTATE SERPL-SCNC: 3.1 MMOL/L (ref 0.5–2)
LYMPHOCYTES # BLD AUTO: 0 % (ref 14–44)
LYMPHOCYTES # BLD AUTO: 0 THOUSAND/UL (ref 0.6–4.47)
MAGNESIUM SERPL-MCNC: 1.7 MG/DL (ref 1.9–2.7)
MCH RBC QN AUTO: 30 PG (ref 26.8–34.3)
MCHC RBC AUTO-ENTMCNC: 31.9 G/DL (ref 31.4–37.4)
MCV RBC AUTO: 94 FL (ref 82–98)
MICROCYTES BLD QL AUTO: PRESENT
MONOCYTES # BLD AUTO: 0.16 THOUSAND/UL (ref 0–1.22)
MONOCYTES NFR BLD: 2 % (ref 4–12)
MYELOCYTES NFR BLD MANUAL: 1 % (ref 0–1)
NEUTROPHILS # BLD MANUAL: 7.7 THOUSAND/UL (ref 1.85–7.62)
NEUTS BAND NFR BLD MANUAL: 8 % (ref 0–8)
NEUTS SEG NFR BLD AUTO: 86 % (ref 43–75)
NRBC BLD AUTO-RTO: 3 /100 WBC (ref 0–2)
O2 CT BLDA-SCNC: 13.3 ML/DL (ref 16–23)
OXYHGB MFR BLDA: 95.7 % (ref 94–97)
P AXIS: 74 DEGREES
P AXIS: 74 DEGREES
PCO2 BLD: 38 MMOL/L (ref 21–32)
PCO2 BLD: 68.7 MM HG (ref 36–44)
PCO2 BLDA: 47.8 MM HG (ref 36–44)
PH BLD: 7.33 [PH] (ref 7.35–7.45)
PH BLDA: 7.43 [PH] (ref 7.35–7.45)
PLATELET # BLD AUTO: 103 THOUSANDS/UL (ref 149–390)
PLATELET BLD QL SMEAR: ABNORMAL
PMV BLD AUTO: 12.7 FL (ref 8.9–12.7)
PO2 BLD: 48 MM HG (ref 75–129)
PO2 BLDA: 88.9 MM HG (ref 75–129)
POLYCHROMASIA BLD QL SMEAR: PRESENT
POTASSIUM BLD-SCNC: 3.8 MMOL/L (ref 3.5–5.3)
POTASSIUM SERPL-SCNC: 3.3 MMOL/L (ref 3.5–5.3)
POTASSIUM SERPL-SCNC: 3.3 MMOL/L (ref 3.5–5.3)
POTASSIUM SERPL-SCNC: 3.5 MMOL/L (ref 3.5–5.3)
POTASSIUM SERPL-SCNC: 3.5 MMOL/L (ref 3.5–5.3)
POTASSIUM SERPL-SCNC: 3.6 MMOL/L (ref 3.5–5.3)
POTASSIUM SERPL-SCNC: 3.8 MMOL/L (ref 3.5–5.3)
POTASSIUM SERPL-SCNC: 3.9 MMOL/L (ref 3.5–5.3)
PR INTERVAL: 120 MS
PR INTERVAL: 146 MS
PROT SERPL-MCNC: 4.8 G/DL (ref 6.4–8.4)
QRS AXIS: 61 DEGREES
QRS AXIS: 65 DEGREES
QRSD INTERVAL: 62 MS
QRSD INTERVAL: 80 MS
QT INTERVAL: 298 MS
QT INTERVAL: 356 MS
QTC INTERVAL: 430 MS
QTC INTERVAL: 490 MS
RBC # BLD AUTO: 3.33 MILLION/UL (ref 3.81–5.12)
RBC MORPH BLD: PRESENT
SAO2 % BLD FROM PO2: 79 % (ref 60–85)
SODIUM BLD-SCNC: 149 MMOL/L (ref 136–145)
SODIUM SERPL-SCNC: 147 MMOL/L (ref 135–147)
SODIUM SERPL-SCNC: 149 MMOL/L (ref 135–147)
SODIUM SERPL-SCNC: 151 MMOL/L (ref 135–147)
SODIUM SERPL-SCNC: 151 MMOL/L (ref 135–147)
SODIUM SERPL-SCNC: 155 MMOL/L (ref 135–147)
SPECIMEN SOURCE: ABNORMAL
SPECIMEN SOURCE: ABNORMAL
T WAVE AXIS: 70 DEGREES
T WAVE AXIS: 74 DEGREES
TOXIC GRANULES BLD QL SMEAR: PRESENT
VENTRICULAR RATE: 163 BPM
VENTRICULAR RATE: 88 BPM
WBC # BLD AUTO: 8.19 THOUSAND/UL (ref 4.31–10.16)

## 2024-02-25 PROCEDURE — 82805 BLOOD GASES W/O2 SATURATION: CPT | Performed by: INTERNAL MEDICINE

## 2024-02-25 PROCEDURE — 99291 CRITICAL CARE FIRST HOUR: CPT | Performed by: INTERNAL MEDICINE

## 2024-02-25 PROCEDURE — 94664 DEMO&/EVAL PT USE INHALER: CPT

## 2024-02-25 PROCEDURE — 80048 BASIC METABOLIC PNL TOTAL CA: CPT | Performed by: STUDENT IN AN ORGANIZED HEALTH CARE EDUCATION/TRAINING PROGRAM

## 2024-02-25 PROCEDURE — 94003 VENT MGMT INPAT SUBQ DAY: CPT

## 2024-02-25 PROCEDURE — 80053 COMPREHEN METABOLIC PANEL: CPT | Performed by: STUDENT IN AN ORGANIZED HEALTH CARE EDUCATION/TRAINING PROGRAM

## 2024-02-25 PROCEDURE — 84484 ASSAY OF TROPONIN QUANT: CPT | Performed by: STUDENT IN AN ORGANIZED HEALTH CARE EDUCATION/TRAINING PROGRAM

## 2024-02-25 PROCEDURE — 85379 FIBRIN DEGRADATION QUANT: CPT | Performed by: STUDENT IN AN ORGANIZED HEALTH CARE EDUCATION/TRAINING PROGRAM

## 2024-02-25 PROCEDURE — 85014 HEMATOCRIT: CPT

## 2024-02-25 PROCEDURE — 85027 COMPLETE CBC AUTOMATED: CPT | Performed by: INTERNAL MEDICINE

## 2024-02-25 PROCEDURE — 84295 ASSAY OF SERUM SODIUM: CPT

## 2024-02-25 PROCEDURE — 93010 ELECTROCARDIOGRAM REPORT: CPT | Performed by: INTERNAL MEDICINE

## 2024-02-25 PROCEDURE — 82948 REAGENT STRIP/BLOOD GLUCOSE: CPT

## 2024-02-25 PROCEDURE — 71045 X-RAY EXAM CHEST 1 VIEW: CPT

## 2024-02-25 PROCEDURE — 94760 N-INVAS EAR/PLS OXIMETRY 1: CPT

## 2024-02-25 PROCEDURE — 93005 ELECTROCARDIOGRAM TRACING: CPT

## 2024-02-25 PROCEDURE — 82803 BLOOD GASES ANY COMBINATION: CPT

## 2024-02-25 PROCEDURE — 94640 AIRWAY INHALATION TREATMENT: CPT

## 2024-02-25 PROCEDURE — 82330 ASSAY OF CALCIUM: CPT | Performed by: STUDENT IN AN ORGANIZED HEALTH CARE EDUCATION/TRAINING PROGRAM

## 2024-02-25 PROCEDURE — 83605 ASSAY OF LACTIC ACID: CPT | Performed by: STUDENT IN AN ORGANIZED HEALTH CARE EDUCATION/TRAINING PROGRAM

## 2024-02-25 PROCEDURE — 85007 BL SMEAR W/DIFF WBC COUNT: CPT | Performed by: INTERNAL MEDICINE

## 2024-02-25 PROCEDURE — 84132 ASSAY OF SERUM POTASSIUM: CPT

## 2024-02-25 PROCEDURE — 82947 ASSAY GLUCOSE BLOOD QUANT: CPT

## 2024-02-25 PROCEDURE — 74018 RADEX ABDOMEN 1 VIEW: CPT

## 2024-02-25 PROCEDURE — 83735 ASSAY OF MAGNESIUM: CPT | Performed by: INTERNAL MEDICINE

## 2024-02-25 PROCEDURE — 82330 ASSAY OF CALCIUM: CPT

## 2024-02-25 PROCEDURE — 99233 SBSQ HOSP IP/OBS HIGH 50: CPT | Performed by: INTERNAL MEDICINE

## 2024-02-25 RX ORDER — VENLAFAXINE 25 MG/1
25 TABLET ORAL
Status: DISCONTINUED | OUTPATIENT
Start: 2024-02-25 | End: 2024-02-25

## 2024-02-25 RX ORDER — SENNOSIDES 8.6 MG
1 TABLET ORAL 2 TIMES DAILY
Status: DISCONTINUED | OUTPATIENT
Start: 2024-02-25 | End: 2024-03-08

## 2024-02-25 RX ORDER — DIAZEPAM 5 MG/1
5 TABLET ORAL EVERY 8 HOURS
Status: DISCONTINUED | OUTPATIENT
Start: 2024-02-25 | End: 2024-02-25

## 2024-02-25 RX ORDER — METHYLPREDNISOLONE SODIUM SUCCINATE 40 MG/ML
40 INJECTION, POWDER, LYOPHILIZED, FOR SOLUTION INTRAMUSCULAR; INTRAVENOUS DAILY
Status: DISCONTINUED | OUTPATIENT
Start: 2024-02-26 | End: 2024-02-25

## 2024-02-25 RX ORDER — SENNOSIDES 8.6 MG
1 TABLET ORAL 2 TIMES DAILY
Status: DISCONTINUED | OUTPATIENT
Start: 2024-02-25 | End: 2024-02-25

## 2024-02-25 RX ORDER — GABAPENTIN 100 MG/1
200 CAPSULE ORAL 3 TIMES DAILY
Status: DISCONTINUED | OUTPATIENT
Start: 2024-02-25 | End: 2024-02-26 | Stop reason: ALTCHOICE

## 2024-02-25 RX ORDER — VENLAFAXINE 25 MG/1
25 TABLET ORAL
Status: DISCONTINUED | OUTPATIENT
Start: 2024-02-25 | End: 2024-03-03

## 2024-02-25 RX ORDER — TOPIRAMATE 100 MG/1
100 TABLET, FILM COATED ORAL 2 TIMES DAILY
Status: DISCONTINUED | OUTPATIENT
Start: 2024-02-25 | End: 2024-02-26 | Stop reason: ALTCHOICE

## 2024-02-25 RX ORDER — MAGNESIUM SULFATE HEPTAHYDRATE 40 MG/ML
2 INJECTION, SOLUTION INTRAVENOUS ONCE
Status: COMPLETED | OUTPATIENT
Start: 2024-02-25 | End: 2024-02-25

## 2024-02-25 RX ORDER — HYDROMORPHONE HCL/PF 1 MG/ML
0.3 SYRINGE (ML) INJECTION ONCE
Status: COMPLETED | OUTPATIENT
Start: 2024-02-25 | End: 2024-02-25

## 2024-02-25 RX ORDER — OXYCODONE HCL 5 MG/5 ML
5 SOLUTION, ORAL ORAL EVERY 8 HOURS
Status: DISCONTINUED | OUTPATIENT
Start: 2024-02-25 | End: 2024-02-28

## 2024-02-25 RX ORDER — POLYETHYLENE GLYCOL 3350 17 G/17G
17 POWDER, FOR SOLUTION ORAL DAILY
Status: DISCONTINUED | OUTPATIENT
Start: 2024-02-26 | End: 2024-04-16

## 2024-02-25 RX ORDER — POTASSIUM CHLORIDE 29.8 MG/ML
40 INJECTION INTRAVENOUS ONCE
Status: COMPLETED | OUTPATIENT
Start: 2024-02-25 | End: 2024-02-25

## 2024-02-25 RX ORDER — OXYCODONE HCL 5 MG/5 ML
5 SOLUTION, ORAL ORAL EVERY 8 HOURS
Status: DISCONTINUED | OUTPATIENT
Start: 2024-02-25 | End: 2024-02-25

## 2024-02-25 RX ORDER — POLYETHYLENE GLYCOL 3350 17 G/17G
17 POWDER, FOR SOLUTION ORAL DAILY
Status: DISCONTINUED | OUTPATIENT
Start: 2024-02-25 | End: 2024-02-25

## 2024-02-25 RX ORDER — MIDAZOLAM HYDROCHLORIDE 2 MG/2ML
1 INJECTION, SOLUTION INTRAMUSCULAR; INTRAVENOUS ONCE
Qty: 2 ML | Refills: 0 | Status: COMPLETED | OUTPATIENT
Start: 2024-02-25 | End: 2024-02-25

## 2024-02-25 RX ADMIN — VENLAFAXINE 25 MG: 25 TABLET ORAL at 16:30

## 2024-02-25 RX ADMIN — HYDROMORPHONE HYDROCHLORIDE 0.3 MG: 1 INJECTION, SOLUTION INTRAMUSCULAR; INTRAVENOUS; SUBCUTANEOUS at 11:15

## 2024-02-25 RX ADMIN — IPRATROPIUM BROMIDE 0.5 MG: 0.5 SOLUTION RESPIRATORY (INHALATION) at 14:34

## 2024-02-25 RX ADMIN — OXYCODONE HYDROCHLORIDE 5 MG: 5 SOLUTION ORAL at 14:47

## 2024-02-25 RX ADMIN — LEVOFLOXACIN 750 MG: 750 INJECTION, SOLUTION INTRAVENOUS at 09:58

## 2024-02-25 RX ADMIN — METOCLOPRAMIDE HYDROCHLORIDE 5 MG: 5 INJECTION INTRAMUSCULAR; INTRAVENOUS at 12:26

## 2024-02-25 RX ADMIN — METOCLOPRAMIDE HYDROCHLORIDE 5 MG: 5 INJECTION INTRAMUSCULAR; INTRAVENOUS at 04:20

## 2024-02-25 RX ADMIN — LAMOTRIGINE 150 MG: 100 TABLET ORAL at 18:12

## 2024-02-25 RX ADMIN — PROPOFOL 30 MCG/KG/MIN: 10 INJECTION, EMULSION INTRAVENOUS at 18:11

## 2024-02-25 RX ADMIN — SODIUM BICARBONATE 50 MEQ: 84 INJECTION INTRAVENOUS at 23:30

## 2024-02-25 RX ADMIN — DEXMEDETOMIDINE HYDROCHLORIDE 1.2 MCG/KG/HR: 4 INJECTION, SOLUTION INTRAVENOUS at 07:25

## 2024-02-25 RX ADMIN — IPRATROPIUM BROMIDE 0.5 MG: 0.5 SOLUTION RESPIRATORY (INHALATION) at 20:34

## 2024-02-25 RX ADMIN — HYDROMORPHONE HYDROCHLORIDE 0.5 MG/HR: 10 INJECTION, SOLUTION INTRAMUSCULAR; INTRAVENOUS; SUBCUTANEOUS at 01:02

## 2024-02-25 RX ADMIN — NYSTATIN: 100000 POWDER TOPICAL at 08:49

## 2024-02-25 RX ADMIN — LAMOTRIGINE 150 MG: 100 TABLET ORAL at 13:51

## 2024-02-25 RX ADMIN — GABAPENTIN 200 MG: 100 CAPSULE ORAL at 21:26

## 2024-02-25 RX ADMIN — CHLORHEXIDINE GLUCONATE 0.12% ORAL RINSE 15 ML: 1.2 LIQUID ORAL at 08:49

## 2024-02-25 RX ADMIN — MAGNESIUM SULFATE HEPTAHYDRATE 2 G: 40 INJECTION, SOLUTION INTRAVENOUS at 13:45

## 2024-02-25 RX ADMIN — ENOXAPARIN SODIUM 40 MG: 40 INJECTION SUBCUTANEOUS at 08:48

## 2024-02-25 RX ADMIN — PROPOFOL 50 MCG/KG/MIN: 10 INJECTION, EMULSION INTRAVENOUS at 02:55

## 2024-02-25 RX ADMIN — REMDESIVIR 100 MG: 100 INJECTION, POWDER, LYOPHILIZED, FOR SOLUTION INTRAVENOUS at 12:25

## 2024-02-25 RX ADMIN — METHYLPREDNISOLONE SODIUM SUCCINATE 40 MG: 40 INJECTION, POWDER, FOR SOLUTION INTRAMUSCULAR; INTRAVENOUS at 08:48

## 2024-02-25 RX ADMIN — DEXMEDETOMIDINE HYDROCHLORIDE 1.2 MCG/KG/HR: 4 INJECTION, SOLUTION INTRAVENOUS at 18:11

## 2024-02-25 RX ADMIN — CHLORHEXIDINE GLUCONATE 0.12% ORAL RINSE 15 ML: 1.2 LIQUID ORAL at 21:26

## 2024-02-25 RX ADMIN — HYDROMORPHONE HYDROCHLORIDE 0.5 MG: 1 INJECTION, SOLUTION INTRAMUSCULAR; INTRAVENOUS; SUBCUTANEOUS at 22:27

## 2024-02-25 RX ADMIN — SODIUM CHLORIDE, SODIUM GLUCONATE, SODIUM ACETATE, POTASSIUM CHLORIDE, MAGNESIUM CHLORIDE, SODIUM PHOSPHATE, DIBASIC, AND POTASSIUM PHOSPHATE 1000 ML: .53; .5; .37; .037; .03; .012; .00082 INJECTION, SOLUTION INTRAVENOUS at 23:30

## 2024-02-25 RX ADMIN — MIDAZOLAM 1 MG: 1 INJECTION INTRAMUSCULAR; INTRAVENOUS at 12:02

## 2024-02-25 RX ADMIN — GABAPENTIN 200 MG: 100 CAPSULE ORAL at 16:30

## 2024-02-25 RX ADMIN — METOCLOPRAMIDE HYDROCHLORIDE 5 MG: 5 INJECTION INTRAMUSCULAR; INTRAVENOUS at 21:26

## 2024-02-25 RX ADMIN — TOPIRAMATE 100 MG: 100 TABLET, FILM COATED ORAL at 18:18

## 2024-02-25 RX ADMIN — TOPIRAMATE 100 MG: 100 TABLET, FILM COATED ORAL at 14:15

## 2024-02-25 RX ADMIN — LEVALBUTEROL HYDROCHLORIDE 1.25 MG: 1.25 SOLUTION RESPIRATORY (INHALATION) at 08:13

## 2024-02-25 RX ADMIN — DEXMEDETOMIDINE HYDROCHLORIDE 1.2 MCG/KG/HR: 4 INJECTION, SOLUTION INTRAVENOUS at 02:55

## 2024-02-25 RX ADMIN — MIDAZOLAM 2 MG: 1 INJECTION INTRAMUSCULAR; INTRAVENOUS at 12:14

## 2024-02-25 RX ADMIN — DEXMEDETOMIDINE HYDROCHLORIDE 0.8 MCG/KG/HR: 4 INJECTION, SOLUTION INTRAVENOUS at 23:21

## 2024-02-25 RX ADMIN — PROPOFOL 50 MCG/KG/MIN: 10 INJECTION, EMULSION INTRAVENOUS at 05:12

## 2024-02-25 RX ADMIN — PROPOFOL 30 MCG/KG/MIN: 10 INJECTION, EMULSION INTRAVENOUS at 12:41

## 2024-02-25 RX ADMIN — SODIUM CHLORIDE 3 UNITS/HR: 9 INJECTION, SOLUTION INTRAVENOUS at 12:30

## 2024-02-25 RX ADMIN — LEVALBUTEROL HYDROCHLORIDE 1.25 MG: 1.25 SOLUTION RESPIRATORY (INHALATION) at 14:34

## 2024-02-25 RX ADMIN — SENNOSIDES 8.6 MG: 8.6 TABLET, FILM COATED ORAL at 18:12

## 2024-02-25 RX ADMIN — HYDROMORPHONE HYDROCHLORIDE 0.5 MG: 1 INJECTION, SOLUTION INTRAMUSCULAR; INTRAVENOUS; SUBCUTANEOUS at 00:26

## 2024-02-25 RX ADMIN — HYDROMORPHONE HYDROCHLORIDE 1.5 MG/HR: 10 INJECTION, SOLUTION INTRAMUSCULAR; INTRAVENOUS; SUBCUTANEOUS at 20:35

## 2024-02-25 RX ADMIN — NYSTATIN: 100000 POWDER TOPICAL at 18:13

## 2024-02-25 RX ADMIN — NOREPINEPHRINE BITARTRATE 3 MCG/MIN: 1 SOLUTION INTRAVENOUS at 14:51

## 2024-02-25 RX ADMIN — POTASSIUM CHLORIDE 40 MEQ: 29.8 INJECTION, SOLUTION INTRAVENOUS at 08:45

## 2024-02-25 RX ADMIN — NOREPINEPHRINE BITARTRATE 15 MCG/MIN: 1 SOLUTION INTRAVENOUS at 23:49

## 2024-02-25 RX ADMIN — DEXMEDETOMIDINE HYDROCHLORIDE 1.2 MCG/KG/HR: 4 INJECTION, SOLUTION INTRAVENOUS at 12:40

## 2024-02-25 RX ADMIN — LEVALBUTEROL HYDROCHLORIDE 1.25 MG: 1.25 SOLUTION RESPIRATORY (INHALATION) at 20:34

## 2024-02-25 RX ADMIN — HYDROMORPHONE HYDROCHLORIDE 0.5 MG: 1 INJECTION, SOLUTION INTRAMUSCULAR; INTRAVENOUS; SUBCUTANEOUS at 10:34

## 2024-02-25 RX ADMIN — IPRATROPIUM BROMIDE 0.5 MG: 0.5 SOLUTION RESPIRATORY (INHALATION) at 08:13

## 2024-02-25 NOTE — PROGRESS NOTES
"[facility]  Progress Note: Critical Care  Name: Carla Hunt 59 y.o. female I MRN: [id number]  Unit/Bed#: MICU 10 I Date of Admission: 1/10/2024   Date of Service: 2/25/2024 I Hospital Day: 46    Assessment/Plan   Acute hypoxic respiratory failure due to severe covid/covid induced pneumonitis  Partial cecal volvulus s/p right hemicolectomy  Stenotrophomonas pneumonia  Steroid induced hyperglycemia  Anemia  Thrombocytopenia  B cell lymphoma  History of small SAH  CKD stage III  Hypertriglyceridemia  Sacral ulcer, unstageable  Seizure disorder  Anxiety    Neuro:   Sedation: Off versed, On precedex drip 1.2, propofol at 45.  -Aim to maintain RASS 0, currently between RASS 0 to -1   -Repeat triglyceride level at 305 from 02/20, repeat triglyceride check from this morning pending  -Plan to wean sedation to assist with our plan for possibility of SBT/extubation     Diagnosis: seizure disorder  -S/p partial left temporal lobe resection in 2007  -Contuinue home lamictal 150 bid and topamax 100 bid     Diagnosis: Anxiety  -On venlafaxine 25 mg TID via NGT  -On zyprexa 5 mg qHS, now dc'd  -Gabapentin 200 mg TID     CV:   Diagnosis: Distributive shock, improving with systolics in the 110-130s mmHg  -Before sending pt to CT on 02/25, 10 mg of vecuronium was given.  -Became bradycardic to 50s, precedex was stopped, was given another dose of vecuronium and atropine.   - See \"Pulm\" below  -On levophed at 1     Pulm:  Diagnosis: Acute hypoxic respiratory failure due to severe covid  and covid induced fibrosis  -Tested COVID positive on 1/7  -Completed severe COVID pathway and 7 days CTX  -Tenuous respiratory status requiring multiple re-admissions to MICU  -S/p Bronch 2/2 and 02/16  -NG on cultures (initially showed non-group A strep)  -PCP and AFB negative   -Legionella, viral, fungal cultures no growth to date  -Pulse dose steroids with solumedrol 1g daily x3 days (completed 2/7) then " transitioned to prednisone 80mg daily on 2/8  -Off veletri  -Completed IVIG for 5 days  -CRP trend 221 (02/19) > 116.9 (02/20) > 94.2 (02/20) > 335.8 (02/21). Limited benefit in trending CRP at this time since it maybe elevated due to multiple other factors including recent volvulus and surgery. Will plan to continue with current solumedrol dosing and monitor patients respiratory status clinical and aim to wean vent FiO2 as tolerated  -CT scan 2/24: general improvement of areas of consolidation and some areas of groundglass opacification on the lungs, still with significant groundglass opacification especially in the lower lobes. Bilateral consolidations in lower lobes. No evidence of PE.  -Solumedrol 40 mg Q12H, will continue given CT findings  -Vent settings: PCMV RR14, 12/8, FiO2 70%  - attempted wean to FiO2 50% and PEEP 8, but pt desaturating to 70s, was increased back to FiO2 70%  -Wean FiO2 as able so that we can move towards SBT  -Patient noted to be wheezing this morning, on Xopenex     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  -Monitor output of ostomy pouch and Hemoglobin  -Now on tube feeds  -CT 2/24: Stomach significantly dilated and full w/ tube feed material  -Hold off tube feeds at this time, begin OG to suction  -Reglan 5 mg TID  -QTc monitoring    -On famotidine 20 mg for GI prophylaxis      :   Diagnosis: CKD III  -Baseline Cr appears to be 0.8-1.2  -UA unremarkable   -Upon chart review 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 It may also be dilutional since she has been on IV fluids for past 24 hours  -Monitor sites for bleeding    Diagnosis: B cell lymphoma  -Follows with heme/onc at [facility]  -On rituxan for 2 year course, started May 2022  -Last dose was 12/20, treatment currently on hold   -Leukopenic on admission but WBC now wnl     DVT ppx with lovenox     Endo:   Diagnosis: steroid hyperglycemia and diabetes mellitus type 2, A1c at 6.6 from 01/2024  -Goal -180  -BG range last 24hrs 129-178  -On insulin drip     ID:   Diagnosis: Severe covid pneumonia and stenotrophomonas pneumonia  -Completed severe COVID pathway with decadron (ended 1/22) and remdesivir (ended 1/20), also completed 7 days CTX on 1/15  -C/f opportunistic infections given immunocompromised status on chemotherapy and recent steroid use  -No consolidations on CXR and procal negative  -S/p bactrim and minocycline x5 days for stenotrophomonas on sputum culture (1/25), then on bactrim for PJP ppx  -Bronc on 2/2 - Cx w/o growth (initially resulted as 1+ alpha hemolytic strep), AFB & PCP negative, f/u fungal, legionella, and viral cultures   -2/10 pt again had escalating O2 requirements on floors necessitating readmission to ICU  -Concern that with recent pulse dose steroids and now worsening respiratory status she could have infection, possibly opportunistic   -UA with moderate leukocytes, nitrite negative, 30-50 WBC, trace protein.  -Repeat sputum culture 2/9 with 4+ stenotrophomonas  -Bronch cultures with candida albicans, Rare gram positive cocci in pairs  -Was on high dose bactrim, received 10 days of this. Was also on vancomycin and cefepime. As per ID recommendations from 02/221, now off all three and switched to levaquin 750 mg IV ever 48 hours, Plan to continue for 5 days.  -As per ID recommendations, on  remdesevir for 5 days  -On levaquin 750 mg, day 7/7     MSK/Skin:   -Wound care team following for bilateral sacral wounds    Disposition: Critical care    ICU Core Measures     Vented Patient  VAP Bundle  VAP bundle ordered     A: Assess, Prevent, and Manage Pain Has pain been assessed? Yes  Need for changes to pain regimen? No   B: Both Spontaneous Awakening Trials (SATs) and Spontaneous Breathing Trials (SBTs) Plan to perform spontaneous awakening trial today? Yes   Plan to perform spontaneous breathing trial today? Yes   Obvious barriers to extubation? Yes   C: Choice of Sedation RASS Goal: 0 Alert and Calm  Need for changes to sedation or analgesia regimen? No   D: Delirium CAM-ICU: Negative   E: Early Mobility  Plan for early mobility? Yes   F: Family Engagement Plan for family engagement today? Yes       Antibiotic Review: Patient on appropriate coverage based on culture data.  and Infectious disease consulted    Review of Invasive Devices:    Diana Plan: Continue for accurate I/O monitoring for 48 hours  Central access plan: Patient has multiple central venous catheters.  Medications requiring central line  Ameila Plan: Keep arterial line for hemodynamic monitoring    Prophylaxis:  VTE VTE covered by:  enoxaparin, Subcutaneous, 40 mg at 02/24/24 0827       Stress Ulcer  not ordered        Significant 24hr Events     24hr events: No acute events overnight. Off dilaudid. On insulin, precedex, levo, and propofol at this time.     Subjective     Review of Systems   Unable to perform ROS: Patient nonverbal        Objective                            Vitals I/O      Most Recent Min/Max in 24hrs   Temp 97.9 °F (36.6 °C) Temp  Min: 97.9 °F (36.6 °C)  Max: 99.7 °F (37.6 °C)   Pulse 77 Pulse  Min: 54  Max: 136   Resp 17 Resp  Min: 9  Max: 51   /71 BP  Min: 92/60  Max: 170/104   O2 Sat 93 % SpO2  Min: 86 %  Max: 100 %      Intake/Output Summary (Last 24 hours) at 2/25/2024 0701  Last data filed at 2/25/2024  0610  Gross per 24 hour   Intake 1786.71 ml   Output 4225 ml   Net -2438.29 ml       Diet NPO; Sips with meds    Invasive Monitoring           Physical Exam   Physical Exam  Vitals reviewed.   Skin:     General: Skin is warm and dry.      Capillary Refill: Capillary refill takes less than 2 seconds.      Comments: Abdominal dressing in place   HENT:      Head: Normocephalic and atraumatic.   Neck:      Vascular: Central line present.   Cardiovascular:      Rate and Rhythm: Normal rate and regular rhythm.      Pulses: Normal pulses.      Heart sounds: Normal heart sounds.   Musculoskeletal:      Right lower leg: No edema.      Left lower leg: No edema.      Comments: 1+ edema in both hands and lower extremities noted   Abdominal: General: Bowel sounds are normal.      Palpations: Abdomen is soft.      Tenderness: There is no abdominal tenderness.      Comments: Ostomy in place w/ fecal content.   Constitutional:       Appearance: She is ill-appearing.      Interventions: She is sedated and intubated.      Comments: eft sided arterial line, ETT , and NGT in place. 1x PIV.    Pulmonary:      Effort: Pulmonary effort is normal. She is intubated.      Breath sounds: Decreased air movement present.   Neurological:      Comments: Patient appears to be sedated, unable to follow commands at this time.    Genitourinary/Anorectal:  Ortiz present.          Diagnostic Studies      EKG:   Imaging: I have personally reviewed pertinent reports.       Medications:  Scheduled PRN   chlorhexidine, 15 mL, Q12H SARTHAK  enoxaparin, 40 mg, Q24H SARTHAK  gabapentin, 200 mg, TID  ipratropium, 0.5 mg, TID  levalbuterol, 1.25 mg, TID  levofloxacin, 750 mg, Q24H  methylPREDNISolone sodium succinate, 40 mg, BID  metoclopramide, 5 mg, Q8H  nystatin, , BID  remdesivir, 100 mg, Q24H      acetaminophen, 1,000 mg, Q8H PRN  hydrALAZINE, 10 mg, Q6H PRN  HYDROmorphone, 0.5 mg, Q1H PRN  midazolam, 2 mg, Q4H PRN  ondansetron, 4 mg, Q6H PRN       Continuous     dexmedetomidine, 0.1-1.2 mcg/kg/hr, Last Rate: 1.2 mcg/kg/hr (02/25/24 0725)  HYDROmorphone, 0.5 mg/hr, Last Rate: 0.5 mg/hr (02/25/24 0102)  insulin regular (HumuLIN R,NovoLIN R) 1 Units/mL in sodium chloride 0.9 % 100 mL infusion, 0.3-21 Units/hr, Last Rate: 0.5 Units/hr (02/25/24 0610)  norepinephrine, 1-30 mcg/min, Last Rate: 1 mcg/min (02/25/24 0717)  propofol, 5-50 mcg/kg/min, Last Rate: 50 mcg/kg/min (02/25/24 0512)         Labs:    CBC    Recent Labs     02/24/24 0413 02/25/24 0419   WBC 10.30* 8.19   HGB 10.0* 10.0*   HCT 29.2* 31.3*   PLT 98* 103*     BMP    Recent Labs     02/25/24 0017 02/25/24 0419   SODIUM 149* 149*   K 3.5 3.3*    109*   CO2 32 34*   AGAP 9 6   BUN 37* 37*   CREATININE 0.59* 0.59*   CALCIUM 9.8 10.1       Coags    No recent results     Additional Electrolytes  Recent Labs     02/24/24 0413 02/25/24 0419   MG 1.6* 1.7*          Blood Gas    Recent Labs     02/25/24 0419   PHART 7.429   [id number] 47.8*   PO2ART 88.9   [id number] 30.9*   BEART 5.8   SOURCE Line, Arterial     Recent Labs     02/25/24 0419   SOURCE Line, Arterial    LFTs  No recent results    Infectious  No recent results  Glucose  Recent Labs     02/24/24  1641 02/24/24  1942 02/25/24  0017 02/25/24 0419   GLUC 283* 212* 101 106               Dl Pillai MD

## 2024-02-25 NOTE — PROGRESS NOTES
Progress Note - Infectious Disease   Carla Hunt 59 y.o. female MRN: [id number]  [id number]      Impression/Recommendations:  Possible bacterial pneumonia.  Initially, on admission early January, secondary to mostly COVID but there was concern for concurrent bacterial pneumonia on admission due to elevated procalcitonin. Patient completed treatment course for both COVID and bacterial pneumonia.  She was clinically improved with decreasing O2 requirement.  However, patient deteriorated in late January, requiring to be placed back on high flow O2 support.  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  Procalcitonin x 3 were in the normal range.  Patient has no fever or leukocytosis.  She improved with increasing steroid dose.  She completed a 5-day course of Bactrim/minocycline for presumptive stenotrophomonas respiratory infection, with expectorated sputum growing stenotrophomonas.  She subsequently had bronchoscopy, with BAL culture negative for bacterial growth, AFB and PCP but completed the course of Bactrim/minocycline prior to bronchoscopy.  Patient improved and O2 was being weaned again until 2 weeks ago, when she deteriorated again.  She is now back in ICU.  Expectorated sputum once again is growing stenotrophomonas.  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  Not sure that the stenotrophomonas that is growing again and expectorated sputum is pathogenic versus upper airway colonization.  Patient was restarted on high-dose steroid and Bactrim.  Patient's respiratory status remained tenuous throughout, required intubation subsequently.  She had bronchoscopy on 2/16 with moderate secretion.  BAL culture had no growth but COVID PCR was positive.  Bactrim was changed to Levaquin over weekend due to ELIESER.  Remdesivir was restarted for possible COVID relapse.  Due to worsening consolidations on CT,  patient is status post repeat bronchoscopy on 2/21, with some purulent secretion.  BAL culture with no bacterial growth thus far.  Growth of Candida is most likely airway colonization in patient on antibiotic.  Given purulent secretion and improvement on Levaquin, will have patient complete Levaquin course.  Continue Levaquin.  Treat x 7 days total Levaquin, complete today.  Continue systemic corticosteroid, with weaning, per critical care service  Monitor temperature/WBC.  Follow-up on final BAL culture.      Severe COVID, present on admission.  Patient was treated with remdesivir, dexamethasone and was given 1 dose of Tocilizumab on admission.  She also had a course of antibiotic initially for presumptive bacterial superinfection.  COVID antigen was negative prior to coming off isolation.  Current chest CT is showing extensive fibrotic changes.  Persistently positive COVID PCR in BAL noted.  This may be all prolonged shedding.  However, given immunosuppressed state and lack of response to antibiotic and high-dose steroid thus far, will treat patient with another course of remdesivir.  Completed another course of remdesivir x 5 days, complete today.     Acute hypoxic respiratory failure, likely multifactorial, with both COVID and bacterial ammonia contributing, with patient back on ventilator last week.  She remains on ventilator.  Patient's oxygenation and vent requirement has been up-and-down but overall improving.  Ventilator support and weaning per critical care medicine service.     CKD.  Creatinine worsened on Bactrim.  Patient is now off Bactrim.  Creatinine has improved.  Monitor creatinine.     5. 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  Patient is status post exploratory laparotomy with ileocecectomy and end ileostomy creation.  Surgery follow-up.     B-cell lymphoma, on chemotherapy, which is currently postponed.  Patient was leukopenic on admission but resolved.  Monitor WBC/ANC.     Discussed  with Dr. Eubanks from critical care medicine service regarding treatment plan above.  As long as patient continues to improve, he is working on extubating patient.       Antibiotics:  Levaquin # 7  Antibiotic # 16  Remdesivir # 5     Subjective:  Patient remains intubated.  She remains sedated but has opens eyes to verbal stimuli  Temperature stays down.  No chills.  She is tolerating antibiotic well.  No nausea, vomiting or diarrhea.    Objective:  Vitals:  Temp:  [97.7 °F (36.5 °C)-99.7 °F (37.6 °C)] 97.7 °F (36.5 °C)  HR:  [] 70  Resp:  [9-51] 19  BP: ()/() 102/61  SpO2:  [86 %-100 %] 91 %  Temp (24hrs), Av.8 °F (37.1 °C), Min:97.7 °F (36.5 °C), Max:99.7 °F (37.6 °C)  Current: Temperature: 97.7 °F (36.5 °C)    Physical Exam:     General: Sedated on ventilator, with some response to verbal stimuli, comfortable, nontoxic.   Neck:  Supple. No mass.  No lymphadenopathy.   Lungs: Expansion symmetric, mild diffuse rhonchi, no rales, no wheezing.   Heart:  Regular rate and rhythm, S1 and S2 normal, no murmur.   Abdomen: Soft, mild distention, incision clean, without purulence/erythema, bowel sounds present.   Extremities: Stable mild leg edema. No erythema/warmth. No ulcer. Nontender to palpation.   Skin:  No rash.   Neuro: Not assessable.     Invasive Devices       Central Venous Catheter Line  Duration             CVC Central Lines 24 4 days              Peripheral Intravenous Line  Duration             Peripheral IV 24 Right Antecubital 2 days              Arterial Line  Duration             Arterial Line 24 Radial 11 days              Drain  Duration             Ileostomy RUQ 4 days    NG/OG/Enteral Tube Nasogastric 18 Fr Left nare 4 days    Urethral Catheter Latex 16 Fr. 4 days              Airway  Duration             ETT  Cuffed 7.5 mm 11 days                    Labs studies:   I have personally reviewed pertinent labs.  Results from last 7 days   Lab Units 24  1625  02/25/24  0017 02/24/24  1942 02/20/24  1759 02/20/24  1523 02/20/24  1515 02/20/24  1412 02/20/24  1412 02/20/24  1010   POTASSIUM mmol/L 3.3* 3.5 3.4*   < >  --   --   --   --  6.1*   CHLORIDE mmol/L 109* 108 106   < >  --   --   --   --  113*   CO2 mmol/L 34* 32 31   < >  --   --   --   --  27   CO2, I-STAT mmol/L  --   --   --   --  23 24   < > 31  --    BUN mg/dL 37* 37* 39*   < >  --   --   --   --  95*   CREATININE mg/dL 0.59* 0.59* 0.68   < >  --   --   --   --  1.62*   EGFR ml/min/1.73sq m 101 101 96   < >  --   --   --   --  34   GLUCOSE, ISTAT mg/dl  --   --   --   --  195* 209*  --  128  --    CALCIUM mg/dL 10.1 9.8 10.1   < >  --   --   --   --  11.8*   AST U/L  --   --   --   --   --   --   --   --  20   ALT U/L  --   --   --   --   --   --   --   --  18   ALK PHOS U/L  --   --   --   --   --   --   --   --  68    < > = values in this interval not displayed.     Results from last 7 days   Lab Units 02/25/24  0419 02/24/24  0413 02/23/24  0602 02/22/24  0436   WBC Thousand/uL 8.19 10.30* 10.61* 23.58*   HEMOGLOBIN g/dL 10.0* 10.0* 9.3* 11.4*   PLATELETS Thousands/uL 103* 98*  --  237     Results from last 7 days   Lab Units 02/21/24  1713 02/21/24  1710 02/21/24  1704   GRAM STAIN RESULT  2+ Polys*  1+ Yeast* Rare Polys  No bacteria seen Rare Polys  No bacteria seen       Imaging Studies:   I have personally reviewed pertinent imaging study reports and images in PACS.    EKG, Pathology, and Other Studies:   I have personally reviewed pertinent reports.

## 2024-02-25 NOTE — PLAN OF CARE
Problem: Prexisting or High Potential for Compromised Skin Integrity  Goal: Skin integrity is maintained or improved  Description: INTERVENTIONS:  - Identify patients at risk for skin breakdown  - Assess and monitor skin integrity  - Assess and monitor nutrition and hydration status  - Monitor labs   - Assess for incontinence   - Turn and reposition patient  - Assist with mobility/ambulation  - Relieve pressure over bony prominences  - Avoid friction and shearing  - Provide appropriate hygiene as needed including keeping skin clean and dry  - Evaluate need for skin moisturizer/barrier cream  - Collaborate with interdisciplinary team   - Patient/family teaching  - Consider wound care consult   Outcome: Not Progressing     Problem: INFECTION - ADULT  Goal: Absence or prevention of progression during hospitalization  Description: INTERVENTIONS:  - Assess and monitor for signs and symptoms of infection  - Monitor lab/diagnostic results  - Monitor all insertion sites, i.e. indwelling lines, tubes, and drains  - Monitor endotracheal if appropriate and nasal secretions for changes in amount and color  - Witter appropriate cooling/warming therapies per order  - Administer medications as ordered  - Instruct and encourage patient and family to use good hand hygiene technique  - Identify and instruct in appropriate isolation precautions for identified infection/condition  Outcome: Not Progressing     Problem: SAFETY ADULT  Goal: Patient will remain free of falls  Description: INTERVENTIONS:  - Educate patient/family on patient safety including physical limitations  - Instruct patient to call for assistance with activity   - Consult OT/PT to assist with strengthening/mobility   - Keep Call bell within reach  - Keep bed low and locked with side rails adjusted as appropriate  - Keep care items and personal belongings within reach  - Initiate and maintain comfort rounds  - Make Fall Risk Sign visible to staff  - Offer  Toileting every 2 Hours, in advance of need  - Initiate/Maintain bed alarm  - Obtain necessary fall risk management equipment: non skid footwear  - Apply yellow socks and bracelet for high fall risk patients  - Consider moving patient to room near nurses station  Outcome: Not Progressing     Problem: RESPIRATORY - ADULT  Goal: Achieves optimal ventilation and oxygenation  Description: INTERVENTIONS:  - Assess for changes in respiratory status  - Assess for changes in mentation and behavior  - Position to facilitate oxygenation and minimize respiratory effort  - Oxygen administered by appropriate delivery if ordered  - Initiate smoking cessation education as indicated  - Encourage broncho-pulmonary hygiene including cough, deep breathe, Incentive Spirometry  - Assess the need for suctioning and aspirate as needed  - Assess and instruct to report SOB or any respiratory difficulty  - Respiratory Therapy support as indicated  Outcome: Not Progressing     Problem: SKIN/TISSUE INTEGRITY - ADULT  Goal: Skin Integrity remains intact(Skin Breakdown Prevention)  Description: Assess:  -Perform Flavio assessment every shift   -Clean and moisturize skin every day   -Inspect skin when repositioning, toileting, and assisting with ADLS  -Assess under medical devices such as ronny  every hour   -Assess extremities for adequate circulation and sensation     Bed Management:  -Have minimal linens on bed & keep smooth, unwrinkled  -Change linens as needed when moist or perspiring  -Avoid sitting or lying in one position for more than 2 hours while in bed  -Keep HOB at 45 degrees     Toileting:  -Offer bedside commode  -Assess for incontinence every hour  -Use incontinent care products after each incontinent episode such as moisture barrier cream     Activity:  -Mobilize patient 3 times a day  -Encourage activity and walks on unit  -Encourage or provide ROM exercises   -Turn and reposition patient every 2 Hours  -Use appropriate  equipment to lift or move patient in bed  -Instruct/ Assist with weight shifting every hour when out of bed in chair  -Consider limitation of chair time 2 hour intervals    Skin Care:  -Avoid use of baby powder, tape, friction and shearing, hot water or constrictive clothing  -Relieve pressure over bony prominences using allevyn   -Do not massage red bony areas    Next Steps:  -Teach patient strategies to minimize risks such as weight shifting    -Consider consults to  interdisciplinary teams such as PT  Outcome: Not Progressing  Goal: Incision(s), wounds(s) or drain site(s) healing without S/S of infection  Description: INTERVENTIONS  - Assess and document dressing, incision, wound bed, drain sites and surrounding tissue  - Provide patient and family education       Problem: Potential for Falls  Goal: Patient will remain free of falls  Description: INTERVENTIONS:  - Educate patient/family on patient safety including physical limitations  - Instruct patient to call for assistance with activity   - Consult OT/PT to assist with strengthening/mobility   - Keep Call bell within reach  - Keep bed low and locked with side rails adjusted as appropriate  - Keep care items and personal belongings within reach  - Initiate and maintain comfort rounds  - Make Fall Risk Sign visible to staff  - Offer Toileting every 2 Hours, in advance of need  - Initiate/Maintain bed alarm  - Obtain necessary fall risk management equipment: non skid footwear  - Apply yellow socks and bracelet for high fall risk patients  - Consider moving patient to room near nurses station  Outcome: Not Progressing     Problem: Nutrition/Hydration-ADULT  Goal: Nutrient/Hydration intake appropriate for improving, restoring or maintaining nutritional needs  Description: Monitor and assess patient's nutrition/hydration status for malnutrition. Collaborate with interdisciplinary team and initiate plan and interventions as ordered.  Monitor patient's weight and  dietary intake as ordered or per policy. Utilize nutrition screening tool and intervene as necessary. Determine patient's food preferences and provide high-protein, high-caloric foods as appropriate.     INTERVENTIONS:  - Monitor oral intake, urinary output, labs, and treatment plans  - Assess nutrition and hydration status and recommend course of action  - Evaluate amount of meals eaten  - Assist patient with eating if necessary   - Allow adequate time for meals  - Recommend/ encourage appropriate diets, oral nutritional supplements, and vitamin/mineral supplements  - Order, calculate, and assess calorie counts as needed  - Recommend, monitor, and adjust tube feedings and TPN/PPN based on assessed needs  - Assess need for intravenous fluids  - Provide specific nutrition/hydration education as appropriate  - Include patient/family/caregiver in decisions related to nutrition  Outcome: Not Progressing     Problem: SAFETY,RESTRAINT: NV/NON-SELF DESTRUCTIVE BEHAVIOR  Goal: Remains free of harm/injury (restraint for non violent/non self-detsructive behavior)  Description: INTERVENTIONS:  - Instruct patient/family regarding restraint use   - Assess and monitor physiologic and psychological status   - Provide interventions and comfort measures to meet assessed patient needs   - Identify and implement measures to help patient regain control  - Assess readiness for release of restraint   Outcome: Not Progressing  Goal: Returns to optimal restraint-free functioning  Description: INTERVENTIONS:  - Assess the patient's behavior and symptoms that indicate continued need for restraint  - Identify and implement measures to help patient regain control  - Assess readiness for release of restraint   Outcome: Not Progressing     Problem: COPING  Goal: Pt/Family able to verbalize concerns and demonstrate effective coping strategies  Description: INTERVENTIONS:  - Assist patient/family to identify coping skills, available support  systems and cultural and spiritual values  - Provide emotional support, including active listening and acknowledgement of concerns of patient and caregivers  - Reduce environmental stimuli, as able  - Provide patient education  - Assess for spiritual pain/suffering and initiate spiritual care, including notification of Pastoral Care or [place] based community as needed  - Assess effectiveness of coping strategies  Outcome: Not Progressing  Goal: Will report anxiety at manageable levels  Description: INTERVENTIONS:  - Administer medication as ordered  - Teach and encourage coping skills  - Provide emotional support  - Assess patient/family for anxiety and ability to cope  Outcome: Not Progressing     Problem: BEHAVIOR  Goal: Pt/Family maintain appropriate behavior and adhere to behavioral management agreement, if implemented  Description: INTERVENTIONS:  - Assess the family dynamic   - Encourage verbalization of thoughts and concerns in a socially appropriate manner  - Assess patient/family's coping skills and non-compliant behavior (including use of illegal substances).  - Utilize positive, consistent limit setting strategies supporting safety of patient, staff and others  - Initiate consult with Case Management, Spiritual Care or other ancillary services as appropriate  - If a patient's/visitor's behavior jeopardizes the safety of the patient, staff, or others, refer to organization procedure.   - Notify Security of behavior or suspected illegal substances which indicate the need for search of the patient and/or belongings  - Encourage participation in the decision making process about a behavioral management agreement; implement if patient meets criteria  Outcome: Not Progressing     Problem: NEUROSENSORY - ADULT  Goal: Achieves stable or improved neurological status  Description: INTERVENTIONS  - Monitor and report changes in neurological status  - Monitor vital signs such as temperature, blood pressure, glucose,  and any other labs ordered   - Initiate measures to prevent increased intracranial pressure  - Monitor for seizure activity and implement precautions if appropriate      Outcome: Not Progressing  Goal: Achieves maximal functionality and self care  Description: INTERVENTIONS  - Monitor swallowing and airway patency with patient fatigue and changes in neurological status  - Encourage and assist patient to increase activity and self care.   - Encourage visually impaired, hearing impaired and aphasic patients to use assistive/communication devices  Outcome: Not Progressing     Problem: CARDIOVASCULAR - ADULT  Goal: Maintains optimal cardiac output and hemodynamic stability  Description: INTERVENTIONS:  - Monitor I/O, vital signs and rhythm  - Monitor for S/S and trends of decreased cardiac output  - Administer and titrate ordered vasoactive medications to optimize hemodynamic stability  - Assess quality of pulses, skin color and temperature  - Assess for signs of decreased coronary artery perfusion  - Instruct patient to report change in severity of symptoms  Outcome: Not Progressing  Goal: Absence of cardiac dysrhythmias or at baseline rhythm  Description: INTERVENTIONS:  - Continuous cardiac monitoring, vital signs, obtain 12 lead EKG if ordered  - Administer antiarrhythmic and heart rate control medications as ordered  - Monitor electrolytes and administer replacement therapy as ordered  Outcome: Not Progressing     Problem: GASTROINTESTINAL - ADULT  Goal: Minimal or absence of nausea and/or vomiting  Description: INTERVENTIONS:  - Administer IV fluids if ordered to ensure adequate hydration  - Maintain NPO status until nausea and vomiting are resolved  - Nasogastric tube if ordered  - Administer ordered antiemetic medications as needed  - Provide nonpharmacologic comfort measures as appropriate  - Advance diet as tolerated, if ordered  - Consider nutrition services referral to assist patient with adequate nutrition  and appropriate food choices  Outcome: Not Progressing  Goal: Maintains or returns to baseline bowel function  Description: INTERVENTIONS:  - Assess bowel function  - Encourage oral fluids to ensure adequate hydration  - Administer IV fluids if ordered to ensure adequate hydration  - Administer ordered medications as needed  - Encourage mobilization and activity  - Consider nutritional services referral to assist patient with adequate nutrition and appropriate food choices  Outcome: Not Progressing  Goal: Maintains adequate nutritional intake  Description: INTERVENTIONS:  - Monitor percentage of each meal consumed  - Identify factors contributing to decreased intake, treat as appropriate  - Assist with meals as needed  - Monitor I&O, weight, and lab values if indicated  - Obtain nutrition services referral as needed  Outcome: Not Progressing  Goal: Establish and maintain optimal ostomy function  Description: INTERVENTIONS:  - Assess bowel function  - Encourage oral fluids to ensure adequate hydration  - Administer IV fluids if ordered to ensure adequate hydration   - Administer ordered medications as needed  - Encourage mobilization and activity  - Nutrition services referral to assist patient with appropriate food choices  - Assess stoma site  - Consider wound care consult   Outcome: Not Progressing  Goal: Oral mucous membranes remain intact  Description: INTERVENTIONS  - Assess oral mucosa and hygiene practices  - Implement preventative oral hygiene regimen  - Implement oral medicated treatments as ordered  - Initiate Nutrition services referral as needed  Outcome: Not Progressing     Problem: GENITOURINARY - ADULT  Goal: Maintains or returns to baseline urinary function  Description: INTERVENTIONS:  - Assess urinary function  - Encourage oral fluids to ensure adequate hydration if ordered  - Administer IV fluids as ordered to ensure adequate hydration  - Administer ordered medications as needed  - Offer frequent  toileting  - Follow urinary retention protocol if ordered  Outcome: Not Progressing  Goal: Urinary catheter remains patent  Description: INTERVENTIONS:  - Assess patency of urinary catheter  - If patient has a chronic marie, consider changing catheter if non-functioning  - Follow guidelines for intermittent irrigation of non-functioning urinary catheter  Outcome: Not Progressing     Problem: METABOLIC, FLUID AND ELECTROLYTES - ADULT  Goal: Electrolytes maintained within normal limits  Description: INTERVENTIONS:  - Monitor labs and assess patient for signs and symptoms of electrolyte imbalances  - Administer electrolyte replacement as ordered  - Monitor response to electrolyte replacements, including repeat lab results as appropriate  - Instruct patient on fluid and nutrition as appropriate  Outcome: Not Progressing  Goal: Fluid balance maintained  Description: INTERVENTIONS:  - Monitor labs   - Monitor I/O and WT  - Instruct patient on fluid and nutrition as appropriate  - Assess for signs & symptoms of volume excess or deficit  Outcome: Not Progressing  Goal: Glucose maintained within target range  Description: INTERVENTIONS:  - Monitor Blood Glucose as ordered  - Assess for signs and symptoms of hyperglycemia and hypoglycemia  - Administer ordered medications to maintain glucose within target range  - Assess nutritional intake and initiate nutrition service referral as needed  Outcome: Not Progressing     Problem: HEMATOLOGIC - ADULT  Goal: Maintains hematologic stability  Description: INTERVENTIONS  - Assess for signs and symptoms of bleeding or hemorrhage  - Monitor labs  - Administer supportive blood products/factors as ordered and appropriate  Outcome: Not Progressing     Problem: MUSCULOSKELETAL - ADULT  Goal: Maintain or return mobility to safest level of function  Description: INTERVENTIONS:  - Assess patient's ability to carry out ADLs; assess patient's baseline for ADL function and identify physical  deficits which impact ability to perform ADLs (bathing, care of mouth/teeth, toileting, grooming, dressing, etc.)  - Assess/evaluate cause of self-care deficits   - Assess range of motion  - Assess patient's mobility  - Assess patient's need for assistive devices and provide as appropriate  - Encourage maximum independence but intervene and supervise when necessary  - Involve family in performance of ADLs  - Assess for home care needs following discharge   - Consider OT consult to assist with ADL evaluation and planning for discharge  - Provide patient education as appropriate  Outcome: Not Progressing

## 2024-02-26 ENCOUNTER — APPOINTMENT (INPATIENT)
Dept: NON INVASIVE DIAGNOSTICS | Facility: HOSPITAL | Age: 59
DRG: 003 | End: 2024-02-26
Payer: COMMERCIAL

## 2024-02-26 ENCOUNTER — APPOINTMENT (INPATIENT)
Dept: RADIOLOGY | Facility: HOSPITAL | Age: 59
DRG: 003 | End: 2024-02-26
Payer: COMMERCIAL

## 2024-02-26 LAB
ALBUMIN SERPL BCP-MCNC: 2.5 G/DL (ref 3.5–5)
ALP SERPL-CCNC: 63 U/L (ref 34–104)
ALT SERPL W P-5'-P-CCNC: 16 U/L (ref 7–52)
ANION GAP SERPL CALCULATED.3IONS-SCNC: 11 MMOL/L
ANION GAP SERPL CALCULATED.3IONS-SCNC: 9 MMOL/L
ANISOCYTOSIS BLD QL SMEAR: PRESENT
AST SERPL W P-5'-P-CCNC: 11 U/L (ref 13–39)
ATRIAL RATE: 135 BPM
ATRIAL RATE: 142 BPM
ATRIAL RATE: 163 BPM
BACTERIA BRONCH AEROBE CULT: ABNORMAL
BASE EX.OXY STD BLDV CALC-SCNC: 77.3 % (ref 60–80)
BASE EXCESS BLDA CALC-SCNC: 1.3 MMOL/L
BASE EXCESS BLDV CALC-SCNC: 4.8 MMOL/L
BASOPHILS # BLD MANUAL: 0 THOUSAND/UL (ref 0–0.1)
BASOPHILS NFR MAR MANUAL: 0 % (ref 0–1)
BILIRUB DIRECT SERPL-MCNC: 0.6 MG/DL (ref 0–0.2)
BILIRUB SERPL-MCNC: 0.78 MG/DL (ref 0.2–1)
BUN SERPL-MCNC: 30 MG/DL (ref 5–25)
BUN SERPL-MCNC: 33 MG/DL (ref 5–25)
CALCIUM SERPL-MCNC: 9.3 MG/DL (ref 8.4–10.2)
CALCIUM SERPL-MCNC: 9.5 MG/DL (ref 8.4–10.2)
CHLORIDE SERPL-SCNC: 111 MMOL/L (ref 96–108)
CHLORIDE SERPL-SCNC: 114 MMOL/L (ref 96–108)
CO2 SERPL-SCNC: 29 MMOL/L (ref 21–32)
CO2 SERPL-SCNC: 33 MMOL/L (ref 21–32)
CREAT SERPL-MCNC: 0.5 MG/DL (ref 0.6–1.3)
CREAT SERPL-MCNC: 0.64 MG/DL (ref 0.6–1.3)
CRP SERPL QL: 338.1 MG/L
D DIMER PPP FEU-MCNC: 1.97 UG/ML FEU
EOSINOPHIL # BLD MANUAL: 0 THOUSAND/UL (ref 0–0.4)
EOSINOPHIL NFR BLD MANUAL: 0 % (ref 0–6)
ERYTHROCYTE [DISTWIDTH] IN BLOOD BY AUTOMATED COUNT: 20.3 % (ref 11.6–15.1)
FUNGUS SPEC CULT: NORMAL
FUNGUS SPEC CULT: NORMAL
GFR SERPL CREATININE-BSD FRML MDRD: 107 ML/MIN/1.73SQ M
GFR SERPL CREATININE-BSD FRML MDRD: 98 ML/MIN/1.73SQ M
GLUCOSE SERPL-MCNC: 117 MG/DL (ref 65–140)
GLUCOSE SERPL-MCNC: 123 MG/DL (ref 65–140)
GLUCOSE SERPL-MCNC: 129 MG/DL (ref 65–140)
GLUCOSE SERPL-MCNC: 135 MG/DL (ref 65–140)
GLUCOSE SERPL-MCNC: 140 MG/DL (ref 65–140)
GLUCOSE SERPL-MCNC: 156 MG/DL (ref 65–140)
GLUCOSE SERPL-MCNC: 158 MG/DL (ref 65–140)
GLUCOSE SERPL-MCNC: 164 MG/DL (ref 65–140)
GLUCOSE SERPL-MCNC: 164 MG/DL (ref 65–140)
GLUCOSE SERPL-MCNC: 198 MG/DL (ref 65–140)
GLUCOSE SERPL-MCNC: 203 MG/DL (ref 65–140)
GLUCOSE SERPL-MCNC: 207 MG/DL (ref 65–140)
GLUCOSE SERPL-MCNC: 219 MG/DL (ref 65–140)
GRAM STN SPEC: ABNORMAL
GRAM STN SPEC: ABNORMAL
HCO3 BLDA-SCNC: 28.3 MMOL/L (ref 22–28)
HCO3 BLDV-SCNC: 33.3 MMOL/L (ref 24–30)
HCT VFR BLD AUTO: 32.1 % (ref 34.8–46.1)
HGB BLD-MCNC: 10 G/DL (ref 11.5–15.4)
HOROWITZ INDEX BLDA+IHG-RTO: 100 MM[HG]
HOROWITZ INDEX BLDA+IHG-RTO: 100 MM[HG]
LACTATE SERPL-SCNC: 2 MMOL/L (ref 0.5–2)
LIPASE SERPL-CCNC: 71 U/L (ref 11–82)
LYMPHOCYTES # BLD AUTO: 0.29 THOUSAND/UL (ref 0.6–4.47)
LYMPHOCYTES # BLD AUTO: 2 % (ref 14–44)
MCH RBC QN AUTO: 30 PG (ref 26.8–34.3)
MCHC RBC AUTO-ENTMCNC: 31.2 G/DL (ref 31.4–37.4)
MCV RBC AUTO: 96 FL (ref 82–98)
METAMYELOCYTES NFR BLD MANUAL: 3 % (ref 0–1)
MONOCYTES # BLD AUTO: 0.14 THOUSAND/UL (ref 0–1.22)
MONOCYTES NFR BLD: 1 % (ref 4–12)
NEUTROPHILS # BLD MANUAL: 13.4 THOUSAND/UL (ref 1.85–7.62)
NEUTS BAND NFR BLD MANUAL: 13 % (ref 0–8)
NEUTS SEG NFR BLD AUTO: 81 % (ref 43–75)
NRBC BLD AUTO-RTO: 1 /100 WBC (ref 0–2)
O2 CT BLDA-SCNC: 16 ML/DL (ref 16–23)
O2 CT BLDV-SCNC: 12.9 ML/DL
OXYHGB MFR BLDA: 98.6 % (ref 94–97)
P AXIS: 74 DEGREES
P AXIS: 79 DEGREES
P AXIS: 82 DEGREES
PCO2 BLDA: 56.1 MM HG (ref 36–44)
PCO2 BLDV: 71.2 MM HG (ref 42–50)
PEEP RESPIRATORY: 15 CM[H2O]
PEEP RESPIRATORY: 15 CM[H2O]
PH BLDA: 7.32 [PH] (ref 7.35–7.45)
PH BLDV: 7.29 [PH] (ref 7.3–7.4)
PLATELET # BLD AUTO: 139 THOUSANDS/UL (ref 149–390)
PLATELET BLD QL SMEAR: ABNORMAL
PMV BLD AUTO: 12.3 FL (ref 8.9–12.7)
PO2 BLDA: 215 MM HG (ref 75–129)
PO2 BLDV: 46.5 MM HG (ref 35–45)
POLYCHROMASIA BLD QL SMEAR: PRESENT
POTASSIUM SERPL-SCNC: 3.3 MMOL/L (ref 3.5–5.3)
POTASSIUM SERPL-SCNC: 5.3 MMOL/L (ref 3.5–5.3)
PR INTERVAL: 120 MS
PR INTERVAL: 122 MS
PR INTERVAL: 142 MS
PRESSURE CONTROL: 14
PRESSURE CONTROL: 14
PROT SERPL-MCNC: 4.9 G/DL (ref 6.4–8.4)
QRS AXIS: 65 DEGREES
QRS AXIS: 69 DEGREES
QRS AXIS: 71 DEGREES
QRSD INTERVAL: 62 MS
QRSD INTERVAL: 62 MS
QRSD INTERVAL: 64 MS
QT INTERVAL: 290 MS
QT INTERVAL: 298 MS
QT INTERVAL: 352 MS
QTC INTERVAL: 435 MS
QTC INTERVAL: 490 MS
QTC INTERVAL: 541 MS
RBC # BLD AUTO: 3.33 MILLION/UL (ref 3.81–5.12)
RBC MORPH BLD: PRESENT
SCAN RESULT: NORMAL
SODIUM SERPL-SCNC: 153 MMOL/L (ref 135–147)
SODIUM SERPL-SCNC: 154 MMOL/L (ref 135–147)
SPECIMEN SOURCE: ABNORMAL
T WAVE AXIS: 74 DEGREES
T WAVE AXIS: 76 DEGREES
T WAVE AXIS: 76 DEGREES
TRIGL SERPL-MCNC: 375 MG/DL
VENT AC: 20
VENT AC: 20
VENT- AC: AC
VENT- AC: AC
VENTRICULAR RATE: 135 BPM
VENTRICULAR RATE: 142 BPM
VENTRICULAR RATE: 163 BPM
WBC # BLD AUTO: 14.26 THOUSAND/UL (ref 4.31–10.16)

## 2024-02-26 PROCEDURE — 93970 EXTREMITY STUDY: CPT | Performed by: SURGERY

## 2024-02-26 PROCEDURE — 93010 ELECTROCARDIOGRAM REPORT: CPT | Performed by: INTERNAL MEDICINE

## 2024-02-26 PROCEDURE — 71275 CT ANGIOGRAPHY CHEST: CPT

## 2024-02-26 PROCEDURE — 86140 C-REACTIVE PROTEIN: CPT

## 2024-02-26 PROCEDURE — 93005 ELECTROCARDIOGRAM TRACING: CPT

## 2024-02-26 PROCEDURE — 80076 HEPATIC FUNCTION PANEL: CPT

## 2024-02-26 PROCEDURE — 94003 VENT MGMT INPAT SUBQ DAY: CPT

## 2024-02-26 PROCEDURE — 94664 DEMO&/EVAL PT USE INHALER: CPT | Performed by: SOCIAL WORKER

## 2024-02-26 PROCEDURE — 99291 CRITICAL CARE FIRST HOUR: CPT | Performed by: INTERNAL MEDICINE

## 2024-02-26 PROCEDURE — 74177 CT ABD & PELVIS W/CONTRAST: CPT

## 2024-02-26 PROCEDURE — 82805 BLOOD GASES W/O2 SATURATION: CPT | Performed by: STUDENT IN AN ORGANIZED HEALTH CARE EDUCATION/TRAINING PROGRAM

## 2024-02-26 PROCEDURE — 94760 N-INVAS EAR/PLS OXIMETRY 1: CPT | Performed by: SOCIAL WORKER

## 2024-02-26 PROCEDURE — 94760 N-INVAS EAR/PLS OXIMETRY 1: CPT

## 2024-02-26 PROCEDURE — 83690 ASSAY OF LIPASE: CPT | Performed by: STUDENT IN AN ORGANIZED HEALTH CARE EDUCATION/TRAINING PROGRAM

## 2024-02-26 PROCEDURE — 80048 BASIC METABOLIC PNL TOTAL CA: CPT

## 2024-02-26 PROCEDURE — NC001 PR NO CHARGE: Performed by: INTERNAL MEDICINE

## 2024-02-26 PROCEDURE — 85379 FIBRIN DEGRADATION QUANT: CPT

## 2024-02-26 PROCEDURE — 80048 BASIC METABOLIC PNL TOTAL CA: CPT | Performed by: STUDENT IN AN ORGANIZED HEALTH CARE EDUCATION/TRAINING PROGRAM

## 2024-02-26 PROCEDURE — 82948 REAGENT STRIP/BLOOD GLUCOSE: CPT

## 2024-02-26 PROCEDURE — 93970 EXTREMITY STUDY: CPT

## 2024-02-26 PROCEDURE — 99233 SBSQ HOSP IP/OBS HIGH 50: CPT | Performed by: INTERNAL MEDICINE

## 2024-02-26 PROCEDURE — 85007 BL SMEAR W/DIFF WBC COUNT: CPT

## 2024-02-26 PROCEDURE — 84478 ASSAY OF TRIGLYCERIDES: CPT | Performed by: STUDENT IN AN ORGANIZED HEALTH CARE EDUCATION/TRAINING PROGRAM

## 2024-02-26 PROCEDURE — 85027 COMPLETE CBC AUTOMATED: CPT

## 2024-02-26 PROCEDURE — 94640 AIRWAY INHALATION TREATMENT: CPT

## 2024-02-26 PROCEDURE — 83605 ASSAY OF LACTIC ACID: CPT | Performed by: STUDENT IN AN ORGANIZED HEALTH CARE EDUCATION/TRAINING PROGRAM

## 2024-02-26 PROCEDURE — 87040 BLOOD CULTURE FOR BACTERIA: CPT | Performed by: STUDENT IN AN ORGANIZED HEALTH CARE EDUCATION/TRAINING PROGRAM

## 2024-02-26 PROCEDURE — 94640 AIRWAY INHALATION TREATMENT: CPT | Performed by: SOCIAL WORKER

## 2024-02-26 RX ORDER — POTASSIUM CHLORIDE 29.8 MG/ML
40 INJECTION INTRAVENOUS ONCE
Status: COMPLETED | OUTPATIENT
Start: 2024-02-26 | End: 2024-02-26

## 2024-02-26 RX ORDER — FAMOTIDINE 40 MG/5ML
20 POWDER, FOR SUSPENSION ORAL DAILY
Status: DISCONTINUED | OUTPATIENT
Start: 2024-02-26 | End: 2024-02-27

## 2024-02-26 RX ORDER — ACETAMINOPHEN 10 MG/ML
1000 INJECTION, SOLUTION INTRAVENOUS EVERY 8 HOURS SCHEDULED
Status: DISCONTINUED | OUTPATIENT
Start: 2024-02-26 | End: 2024-02-27

## 2024-02-26 RX ORDER — SODIUM CHLORIDE, SODIUM GLUCONATE, SODIUM ACETATE, POTASSIUM CHLORIDE, MAGNESIUM CHLORIDE, SODIUM PHOSPHATE, DIBASIC, AND POTASSIUM PHOSPHATE .53; .5; .37; .037; .03; .012; .00082 G/100ML; G/100ML; G/100ML; G/100ML; G/100ML; G/100ML; G/100ML
1000 INJECTION, SOLUTION INTRAVENOUS ONCE
Status: COMPLETED | OUTPATIENT
Start: 2024-02-26 | End: 2024-02-26

## 2024-02-26 RX ORDER — ALBUMIN, HUMAN INJ 5% 5 %
12.5 SOLUTION INTRAVENOUS ONCE
Status: COMPLETED | OUTPATIENT
Start: 2024-02-26 | End: 2024-02-26

## 2024-02-26 RX ORDER — METHYLPREDNISOLONE SODIUM SUCCINATE 125 MG/2ML
125 INJECTION, POWDER, LYOPHILIZED, FOR SOLUTION INTRAMUSCULAR; INTRAVENOUS DAILY
Status: DISCONTINUED | OUTPATIENT
Start: 2024-02-26 | End: 2024-02-26

## 2024-02-26 RX ORDER — GABAPENTIN 250 MG/5ML
200 SOLUTION ORAL 3 TIMES DAILY
Status: DISCONTINUED | OUTPATIENT
Start: 2024-02-26 | End: 2024-03-01

## 2024-02-26 RX ORDER — METHYLPREDNISOLONE SODIUM SUCCINATE 125 MG/2ML
80 INJECTION, POWDER, LYOPHILIZED, FOR SOLUTION INTRAMUSCULAR; INTRAVENOUS ONCE
Status: COMPLETED | OUTPATIENT
Start: 2024-02-26 | End: 2024-02-26

## 2024-02-26 RX ORDER — METHYLPREDNISOLONE SODIUM SUCCINATE 125 MG/2ML
125 INJECTION, POWDER, LYOPHILIZED, FOR SOLUTION INTRAMUSCULAR; INTRAVENOUS EVERY 6 HOURS SCHEDULED
Status: DISCONTINUED | OUTPATIENT
Start: 2024-02-26 | End: 2024-02-26

## 2024-02-26 RX ORDER — METHYLPREDNISOLONE SODIUM SUCCINATE 125 MG/2ML
125 INJECTION, POWDER, LYOPHILIZED, FOR SOLUTION INTRAMUSCULAR; INTRAVENOUS EVERY 6 HOURS SCHEDULED
Status: DISCONTINUED | OUTPATIENT
Start: 2024-02-26 | End: 2024-02-28

## 2024-02-26 RX ORDER — METHYLPREDNISOLONE SODIUM SUCCINATE 125 MG/2ML
125 INJECTION, POWDER, LYOPHILIZED, FOR SOLUTION INTRAMUSCULAR; INTRAVENOUS EVERY 12 HOURS SCHEDULED
Status: DISCONTINUED | OUTPATIENT
Start: 2024-02-26 | End: 2024-02-26

## 2024-02-26 RX ORDER — LEVOFLOXACIN 5 MG/ML
750 INJECTION, SOLUTION INTRAVENOUS EVERY 24 HOURS
Status: COMPLETED | OUTPATIENT
Start: 2024-02-26 | End: 2024-02-28

## 2024-02-26 RX ADMIN — CHLORHEXIDINE GLUCONATE 0.12% ORAL RINSE 15 ML: 1.2 LIQUID ORAL at 21:45

## 2024-02-26 RX ADMIN — NYSTATIN: 100000 POWDER TOPICAL at 17:05

## 2024-02-26 RX ADMIN — LAMOTRIGINE 150 MG: 100 TABLET ORAL at 17:05

## 2024-02-26 RX ADMIN — LEVALBUTEROL HYDROCHLORIDE 1.25 MG: 1.25 SOLUTION RESPIRATORY (INHALATION) at 13:19

## 2024-02-26 RX ADMIN — NOREPINEPHRINE BITARTRATE 16 MCG/MIN: 1 SOLUTION INTRAVENOUS at 04:01

## 2024-02-26 RX ADMIN — PREDNISONE 50 MG: 20 TABLET ORAL at 08:27

## 2024-02-26 RX ADMIN — OXYCODONE HYDROCHLORIDE 5 MG: 5 SOLUTION ORAL at 07:35

## 2024-02-26 RX ADMIN — VENLAFAXINE 25 MG: 25 TABLET ORAL at 16:05

## 2024-02-26 RX ADMIN — LEVOFLOXACIN 750 MG: 750 INJECTION, SOLUTION INTRAVENOUS at 13:21

## 2024-02-26 RX ADMIN — NOREPINEPHRINE BITARTRATE 5 MCG/MIN: 1 SOLUTION INTRAVENOUS at 16:10

## 2024-02-26 RX ADMIN — DEXMEDETOMIDINE HYDROCHLORIDE 0.8 MCG/KG/HR: 4 INJECTION, SOLUTION INTRAVENOUS at 14:54

## 2024-02-26 RX ADMIN — VENLAFAXINE 25 MG: 25 TABLET ORAL at 07:31

## 2024-02-26 RX ADMIN — LEVALBUTEROL HYDROCHLORIDE 1.25 MG: 1.25 SOLUTION RESPIRATORY (INHALATION) at 19:07

## 2024-02-26 RX ADMIN — METHYLPREDNISOLONE SODIUM SUCCINATE 125 MG: 125 INJECTION, POWDER, FOR SOLUTION INTRAMUSCULAR; INTRAVENOUS at 16:01

## 2024-02-26 RX ADMIN — TOPIRAMATE 100 MG: 100 TABLET, FILM COATED ORAL at 09:37

## 2024-02-26 RX ADMIN — SENNOSIDES 8.6 MG: 8.6 TABLET, FILM COATED ORAL at 08:27

## 2024-02-26 RX ADMIN — OXYCODONE HYDROCHLORIDE 5 MG: 5 SOLUTION ORAL at 14:50

## 2024-02-26 RX ADMIN — DEXMEDETOMIDINE HYDROCHLORIDE 0.8 MCG/KG/HR: 4 INJECTION, SOLUTION INTRAVENOUS at 06:29

## 2024-02-26 RX ADMIN — GABAPENTIN 200 MG: 250 SOLUTION ORAL at 22:13

## 2024-02-26 RX ADMIN — PROPOFOL 20 MCG/KG/MIN: 10 INJECTION, EMULSION INTRAVENOUS at 18:45

## 2024-02-26 RX ADMIN — LEVALBUTEROL HYDROCHLORIDE 1.25 MG: 1.25 SOLUTION RESPIRATORY (INHALATION) at 07:46

## 2024-02-26 RX ADMIN — PROPOFOL 30 MCG/KG/MIN: 10 INJECTION, EMULSION INTRAVENOUS at 01:10

## 2024-02-26 RX ADMIN — IPRATROPIUM BROMIDE 0.5 MG: 0.5 SOLUTION RESPIRATORY (INHALATION) at 13:18

## 2024-02-26 RX ADMIN — FAMOTIDINE 20 MG: 40 POWDER, FOR SUSPENSION ORAL at 18:52

## 2024-02-26 RX ADMIN — ENOXAPARIN SODIUM 40 MG: 40 INJECTION SUBCUTANEOUS at 08:28

## 2024-02-26 RX ADMIN — METOCLOPRAMIDE HYDROCHLORIDE 5 MG: 5 INJECTION INTRAMUSCULAR; INTRAVENOUS at 03:45

## 2024-02-26 RX ADMIN — GABAPENTIN 200 MG: 250 SOLUTION ORAL at 09:37

## 2024-02-26 RX ADMIN — GABAPENTIN 200 MG: 250 SOLUTION ORAL at 16:01

## 2024-02-26 RX ADMIN — ACETAMINOPHEN 1000 MG: 10 INJECTION INTRAVENOUS at 21:45

## 2024-02-26 RX ADMIN — VASOPRESSIN 0.04 UNITS/MIN: 20 INJECTION INTRAVENOUS at 20:45

## 2024-02-26 RX ADMIN — POTASSIUM CHLORIDE 40 MEQ: 29.8 INJECTION, SOLUTION INTRAVENOUS at 07:56

## 2024-02-26 RX ADMIN — METOCLOPRAMIDE HYDROCHLORIDE 5 MG: 5 INJECTION INTRAMUSCULAR; INTRAVENOUS at 21:45

## 2024-02-26 RX ADMIN — LAMOTRIGINE 150 MG: 100 TABLET ORAL at 08:27

## 2024-02-26 RX ADMIN — IPRATROPIUM BROMIDE 0.5 MG: 0.5 SOLUTION RESPIRATORY (INHALATION) at 07:46

## 2024-02-26 RX ADMIN — VASOPRESSIN 0.04 UNITS/MIN: 20 INJECTION INTRAVENOUS at 11:30

## 2024-02-26 RX ADMIN — POLYETHYLENE GLYCOL 3350 17 G: 17 POWDER, FOR SOLUTION ORAL at 08:27

## 2024-02-26 RX ADMIN — PROPOFOL 30 MCG/KG/MIN: 10 INJECTION, EMULSION INTRAVENOUS at 05:35

## 2024-02-26 RX ADMIN — ACETAMINOPHEN 1000 MG: 10 INJECTION INTRAVENOUS at 09:21

## 2024-02-26 RX ADMIN — CHLORHEXIDINE GLUCONATE 0.12% ORAL RINSE 15 ML: 1.2 LIQUID ORAL at 08:27

## 2024-02-26 RX ADMIN — DEXMEDETOMIDINE HYDROCHLORIDE 0.6 MCG/KG/HR: 4 INJECTION, SOLUTION INTRAVENOUS at 22:21

## 2024-02-26 RX ADMIN — VASOPRESSIN 0.04 UNITS/MIN: 20 INJECTION INTRAVENOUS at 04:43

## 2024-02-26 RX ADMIN — IPRATROPIUM BROMIDE 0.5 MG: 0.5 SOLUTION RESPIRATORY (INHALATION) at 19:07

## 2024-02-26 RX ADMIN — NYSTATIN: 100000 POWDER TOPICAL at 09:37

## 2024-02-26 RX ADMIN — TOPIRAMATE 100 MG: 100 TABLET, FILM COATED ORAL at 17:05

## 2024-02-26 RX ADMIN — PROPOFOL 30 MCG/KG/MIN: 10 INJECTION, EMULSION INTRAVENOUS at 11:30

## 2024-02-26 RX ADMIN — VENLAFAXINE 25 MG: 25 TABLET ORAL at 11:56

## 2024-02-26 RX ADMIN — POTASSIUM CHLORIDE 40 MEQ: 29.8 INJECTION, SOLUTION INTRAVENOUS at 03:44

## 2024-02-26 RX ADMIN — SENNOSIDES 8.6 MG: 8.6 TABLET, FILM COATED ORAL at 17:05

## 2024-02-26 RX ADMIN — IOHEXOL 85 ML: 350 INJECTION, SOLUTION INTRAVENOUS at 02:01

## 2024-02-26 RX ADMIN — ALBUMIN (HUMAN) 12.5 G: 12.5 INJECTION, SOLUTION INTRAVENOUS at 14:50

## 2024-02-26 RX ADMIN — HYDROMORPHONE HYDROCHLORIDE 0.5 MG: 1 INJECTION, SOLUTION INTRAMUSCULAR; INTRAVENOUS; SUBCUTANEOUS at 08:16

## 2024-02-26 RX ADMIN — NOREPINEPHRINE BITARTRATE 14 MCG/MIN: 1 SOLUTION INTRAVENOUS at 09:10

## 2024-02-26 RX ADMIN — METHYLPREDNISOLONE SODIUM SUCCINATE 80 MG: 125 INJECTION, POWDER, FOR SOLUTION INTRAMUSCULAR; INTRAVENOUS at 11:52

## 2024-02-26 RX ADMIN — ACETAMINOPHEN 1000 MG: 10 INJECTION INTRAVENOUS at 14:50

## 2024-02-26 RX ADMIN — METOCLOPRAMIDE HYDROCHLORIDE 5 MG: 5 INJECTION INTRAMUSCULAR; INTRAVENOUS at 11:53

## 2024-02-26 NOTE — RESPIRATORY THERAPY NOTE
RT Ventilator Management Note  Carla Hunt 59 y.o. female MRN: [id number]  Unit/Bed#: [facility] 10 Encounter: [id number]      Daily Screen         2/25/2024  0817 2/26/2024  0746          Patient safety screen outcome:: Failed Failed      Not Ready for Weaning due to:: Underline problem not resolved PEEP > 8cmH2O;FiO2 >60%;Underline problem not resolved                Physical Exam:   Assessment Type: Assess only  General Appearance: Unresponsive, Sedated  Respiratory Pattern: Assisted  Chest Assessment: Chest expansion symmetrical  Bilateral Breath Sounds: Coarse  Cough: Non-productive  Suction: ET Tube  O2 Device: Ventilator      Resp Comments: (P) Pt has clear bs. No sx needed. 02 decreased to 90 then 80. Sat remains 97%. Will cont to montior pts resp status.

## 2024-02-26 NOTE — RESPIRATORY THERAPY NOTE
RT Ventilator Management Note  Carla Hunt 59 y.o. female MRN: [id number]  [facility]: [id number]      Daily Screen         2/24/2024  0856 2/25/2024  0817          Patient safety screen outcome:: Failed Failed      Not Ready for Weaning due to:: Underline problem not resolved Underline problem not resolved                Physical Exam:   Assessment Type: Assess only  General Appearance: Unresponsive, Sedated  Respiratory Pattern: Assisted  Chest Assessment: Chest expansion symmetrical  Bilateral Breath Sounds: Coarse  Cough: Non-productive  Suction: ET Tube  O2 Device: Ventilator      Resp Comments: Pt. transported to CT scan via transport vent and back to MICU 10 without incident.  Pt. placed back on Summers G5 on CMV/PC mode.  Will continue to monitor pt per protocol.

## 2024-02-26 NOTE — PROGRESS NOTES
Spiritual Care Progress Note    2024  Patient: Carla Hunt : 1965  Admission Date & Time: 1/10/2024 1941  MRN: [id number] CSN: [id number]        Fr Daugherty met with the pt and pt's . No further needs were expressed at this time.    Chaplains still remain available.       24 1100   Clinical Encounter Type   Visited With Patient and family together   Yazdanism Encounters   Yazdanism Needs Prayer

## 2024-02-26 NOTE — UTILIZATION REVIEW
"Continued Stay Review    Date: 02/26                          Current Patient Class: IP  Current Level of Care: Level 2 stepdown/HOT    HPI:59 y.o. female initially admitted on 01/10     Assessment/Plan: Throughout thenight, patient was hypoxic low 90s or high 80s, then suddenly she became tachycardic, Bps with systolics to 40s, Concern for acute PE. Received an amp of bicarb, 1L isolyte, CXR+KUB, CT CAP with contrast and everything same as before, no PE. 100% FIO2, 15 PEEP, still saturating in low 90s. 15 levo, on vaso. Did get 1L isolyte bolus overnight.  Cont MV- PCMV RR20, 14pcontrol, 15 PEEP, 100% FiO2.    Cont sedation. Cont Vasopressors. Maintain MAPs>65. Hod TF. OG tube o suction. Cont IV steroids. Cont Insulin gtt. Cont IV abx.    Vital Signs: /64   Pulse (!) 109   Temp (!) 102.2 °F (39 °C)   Resp 20   Ht 5' 8\" (1.727 m)   Wt 74 kg (163 lb 2.3 oz)   SpO2 100%   BMI 24.81 kg/m²       Pertinent Labs/Diagnostic Results:   02/26   CT PE study w/ abd pelvis:  1.  No evidence of pulmonary embolism.  2.  Dense patchy opacities in the bilateral lower lobes and groundglass opacities within the upper lung fields similar to prior examination likely due to pneumonia.  3.  Mild thickening of the remaining colon which may be due to under distention versus nonspecific colitis.  4.  Small amount of ascites similar to prior examination.    EKG result:Sinus tachycardia with Premature atrial complexes          Results from last 7 days   Lab Units 02/26/24  1446 02/25/24  2300 02/25/24  0419 02/24/24  0413 02/23/24  0602 02/21/24  1533 02/21/24  0553   WBC Thousand/uL 14.26*  --  8.19 10.30* 10.61*   < > 13.78*   HEMOGLOBIN g/dL 10.0*  --  10.0* 10.0* 9.3*   < > 7.2*   I STAT HEMOGLOBIN g/dl  --  9.9*  --   --   --   --   --    HEMATOCRIT % 32.1*  --  31.3* 29.2* 29.1*   < > 22.8*   HEMATOCRIT, ISTAT %  --  29*  --   --   --   --   --    PLATELETS Thousands/uL 139*  --  103* 98*  --    < > 196   BANDS PCT % 13*  " --  8  --   --   --  28*    < > = values in this interval not displayed.         Results from last 7 days   Lab Units 02/26/24  1446 02/26/24  0130 02/25/24  2301 02/25/24  2300 02/25/24  1942 02/25/24  1646 02/25/24  0844 02/25/24  0419 02/24/24  0817 02/24/24  0413 02/23/24  1151 02/23/24  0602 02/22/24  0831 02/22/24  0436 02/21/24  1853 02/21/24  0553 02/21/24  0403 02/20/24  1759 02/20/24  1523 02/20/24  1515   SODIUM mmol/L 154* 153* 155*  --  151* 151*   < > 149*   < > 146   < > 150*   < > 146   < >  --    < > 151*  --   --    POTASSIUM mmol/L 5.3 3.3* 3.9  --  3.5 3.6   < > 3.3*   < > 3.9   < > 4.3   < > 5.0   < >  --    < > 4.9  --   --    CHLORIDE mmol/L 114* 111* 108  --  112* 110*   < > 109*   < > 110*   < > 115*   < > 115*   < >  --    < > 115*  --   --    CO2 mmol/L 29 33* 39*  --  32 32   < > 34*   < > 30   < > 28   < > 28   < >  --    < > 27  --   --    CO2, I-STAT mmol/L  --   --   --  38*  --   --   --   --   --   --   --   --   --   --   --   --   --   --  23 24   ANION GAP mmol/L 11 9 8  --  7 9   < > 6   < > 6   < > 7   < > 3   < >  --    < > 9  --   --    BUN mg/dL 33* 30* 32*  --  32* 31*   < > 37*   < > 43*   < > 47*   < > 48*   < >  --    < > 77*  --   --    CREATININE mg/dL 0.64 0.50* 0.62  --  0.51* 0.48*   < > 0.59*   < > 0.70   < > 0.77   < > 0.99   < >  --    < > 1.56*  --   --    EGFR ml/min/1.73sq m 98 107 99  --  106 108   < > 101   < > 95   < > 85   < > 63   < >  --    < > 36  --   --    CALCIUM mg/dL 9.5 9.3 9.9  --  9.8 10.0   < > 10.1   < > 10.5*   < > 10.0   < > 10.3*   < >  --    < > 10.4*  --   --    CALCIUM, IONIZED mmol/L  --   --  1.31  --   --   --   --   --   --   --   --   --   --   --   --   --   --  1.37*  --   --    CALCIUM, IONIZED, ISTAT mmol/L  --   --   --  1.39*  --   --   --   --   --   --   --   --   --   --   --   --   --   --  1.50* 1.50*   MAGNESIUM mg/dL  --   --   --   --   --   --   --  1.7*  --  1.6*  --  1.9  --  2.0  --  2.1  --  2.5  --   --   "  PHOSPHORUS mg/dL  --   --   --   --   --   --   --   --   --   --   --   --   --   --   --   --   --  4.0  --   --     < > = values in this interval not displayed.     Results from last 7 days   Lab Units 02/26/24  1446 02/25/24  2301 02/20/24  1759 02/20/24  1010   AST U/L 11* 10*  --  20   ALT U/L 16 17  --  18   ALK PHOS U/L 63 68  --  68   TOTAL PROTEIN g/dL 4.9* 4.8* 5.1* 6.9   ALBUMIN g/dL 2.5* 2.4*  --  3.0*   TOTAL BILIRUBIN mg/dL 0.78 0.70  --  0.51   BILIRUBIN DIRECT mg/dL 0.60*  --   --  0.16     Results from last 7 days   Lab Units 02/26/24  0845 02/26/24  0547 02/26/24  0411 02/26/24  0046 02/25/24  2207 02/25/24  1945 02/25/24  1822 02/25/24  1649 02/25/24  1355 02/25/24  1200 02/25/24  1012 02/25/24  0846   POC GLUCOSE mg/dl 158* 117 123 140 142* 133 117 85 157* 172* 136 102     Results from last 7 days   Lab Units 02/26/24  1446 02/26/24  0130 02/25/24  2301 02/25/24  1942 02/25/24  1646 02/25/24  1224 02/25/24  0844 02/25/24  0419 02/25/24  0017 02/24/24  1942 02/24/24  1641 02/24/24  1245   GLUCOSE RANDOM mg/dL 219* 135 170* 128 95 155* 108 106 101 212* 283* 211*     Results from last 7 days   Lab Units 02/24/24  0413 02/22/24 2018 02/22/24  0436 02/21/24  2112 02/21/24  2035 02/21/24  1533 02/21/24  1301 02/21/24  0807 02/21/24  0403 02/21/24  0021   OSMOLALITY, SERUM mmol/* 332* 343* 334* 336* 334* 340* 345* 348* 346*         No results found for: \"BETA-HYDROXYBUTYRATE\"   Results from last 7 days   Lab Units 02/26/24  1202 02/25/24  0419 02/24/24  0815   PH ART  7.320* 7.429 7.393   PCO2 ART mm Hg 56.1* 47.8* 44.0   PO2 ART mm Hg 215.0* 88.9 77.1   HCO3 ART mmol/L 28.3* 30.9* 26.2   BASE EXC ART mmol/L 1.3 5.8 1.1   O2 CONTENT ART mL/dL 16.0 13.3* 14.8*   O2 HGB, ARTERIAL % 98.6* 95.7 93.7*   ABG SOURCE  Line, Arterial Line, Arterial Line, Arterial     Results from last 7 days   Lab Units 02/26/24  0728   PH NICA  7.288*   PCO2 NICA mm Hg 71.2*   PO2 NICA mm Hg 46.5*   HCO3 NICA mmol/L 33.3* "   BASE EXC NICA mmol/L 4.8   O2 CONTENT NICA ml/dL 12.9   O2 HGB, VENOUS % 77.3     Results from last 7 days   Lab Units 02/25/24  2300 02/20/24  1523 02/20/24  1515   I STAT BASE EXC mmol/L 9* -5* -4*   I STAT O2 SAT % 79 92* 95*   ISTAT PH ART  7.331* 7.247* 7.244*   I STAT ART PCO2 mm HG 68.7* 50.1* 52.1*   I STAT ART PO2 mm HG 48.0* 74.0* 88.0   I STAT ART HCO3 mmol/L 36.3* 21.8* 22.5             Results from last 7 days   Lab Units 02/26/24  1446 02/25/24  2301   D-DIMER QUANTITATIVE ug/ml FEU 1.97* 1.58*                 Results from last 7 days   Lab Units 02/26/24  0130 02/25/24  2301 02/24/24  1245 02/22/24  0022 02/21/24 2112 02/20/24  1759 02/20/24  0758   LACTIC ACID mmol/L 2.0 3.1* 1.4 1.5 2.5* 2.7* 2.9*                         Results from last 7 days   Lab Units 02/22/24  0555 02/21/24  1622   UNIT PRODUCT CODE  A1216B64  [id number]  [id number]   UNIT NUMBER  [id number]  [id number]  [id number]   UNITABO  A  A A  A   UNITRH  NEG  NEG NEG  NEG   CROSSMATCH  Compatible  Compatible Compatible  Compatible   UNIT DISPENSE STATUS  Presumed Trans  Presumed Trans Presumed Trans  Return to Inv   UNIT PRODUCT VOL mL 350  350 350  350         Results from last 7 days   Lab Units 02/26/24  0130   LIPASE u/L 71     Results from last 7 days   Lab Units 02/26/24  1446 02/21/24  0553 02/20/24  0449   CRP mg/L 338.1* 335.8* 94.2*         Results from last 7 days   Lab Units 02/24/24  0413 02/22/24  2018 02/22/24  0436 02/21/24  2112 02/21/24  2035 02/21/24  1533 02/21/24  1301   OSMOLALITY, SERUM mmol/* 332* 343* 334* 336* 334* 340*     Results from last 7 days   Lab Units 02/20/24  0758   CLARITY UA  Clear   COLOR UA  Light Yellow   SPEC GRAV UA  1.017   PH UA  6.0   GLUCOSE UA mg/dl Negative   KETONES UA mg/dl Negative   BLOOD UA  Moderate*   PROTEIN UA mg/dl Negative   NITRITE UA  Negative   BILIRUBIN UA  Negative   UROBILINOGEN UA (BE) mg/dl <2.0   LEUKOCYTES UA   Negative   WBC UA /hpf 2-4*   RBC UA /hpf Innumerable*   BACTERIA UA /hpf None Seen   EPITHELIAL CELLS WET PREP /hpf None Seen                                 Results from last 7 days   Lab Units 02/26/24  1251 02/26/24  1144 02/21/24  1713 02/21/24  1710 02/21/24  1704   BLOOD CULTURE  Received in Microbiology Lab. Culture in Progress. Received in Microbiology Lab. Culture in Progress.  --   --   --    GRAM STAIN RESULT   --   --  2+ Polys*  1+ Yeast* Rare Polys  No bacteria seen Rare Polys  No bacteria seen     Results from last 7 days   Lab Units 02/21/24  1710 02/21/24  1704   TOTAL COUNTED  100 100               Medications:   Scheduled Medications:  acetaminophen, 1,000 mg, Intravenous, Q8H SARTHAK  chlorhexidine, 15 mL, Mouth/Throat, Q12H SARTHAK  enoxaparin, 40 mg, Subcutaneous, Q24H SARTHAK  famotidine, 20 mg, Oral, Daily  gabapentin, 200 mg, Oral, TID  ipratropium, 0.5 mg, Nebulization, TID  lamoTRIgine, 150 mg, Oral, BID  levalbuterol, 1.25 mg, Nebulization, TID  levofloxacin, 750 mg, Intravenous, Q24H  methylPREDNISolone sodium succinate, 125 mg, Intravenous, Q6H SARTHAK  metoclopramide, 5 mg, Intravenous, Q8H  nystatin, , Topical, BID  oxyCODONE, 5 mg, Per NG Tube, Q8H  polyethylene glycol, 17 g, Per NG Tube, Daily  senna, 1 tablet, Per NG Tube, BID  topiramate, 100 mg, Per PEG Tube, BID  venlafaxine, 25 mg, Per NG Tube, TID With Meals      Continuous IV Infusions:  dexmedetomidine, 0.1-1.2 mcg/kg/hr, Intravenous, Titrated  HYDROmorphone, 1.5 mg/hr, Intravenous, Continuous  insulin regular (HumuLIN R,NovoLIN R) 1 Units/mL in sodium chloride 0.9 % 100 mL infusion, 0.3-21 Units/hr, Intravenous, Titrated  norepinephrine, 1-30 mcg/min, Intravenous, Titrated  propofol, 5-50 mcg/kg/min, Intravenous, Titrated  vasopressin, 0.04 Units/min, Intravenous, Continuous      PRN Meds:  hydrALAZINE, 10 mg, Intravenous, Q6H PRN  HYDROmorphone, 0.5 mg, Intravenous, Q1H PRN  midazolam, 2 mg, Intravenous, Q4H PRN  ondansetron, 4 mg,  Intravenous, Q6H PRN        Discharge Plan: TBD    Network Utilization Review Department  ATTENTION: Please call with any questions or concerns to [phone] and carefully listen to the prompts so that you are directed to the right person. All voicemails are confidential.   For Discharge needs, contact Care Management DC Support Team at [phone] opt. [phone]  Send all requests for admission clinical reviews, approved or denied determinations and any other requests to dedicated fax number below belonging to the [facility] where the patient is receiving treatment. List of dedicated fax numbers for the Facilities:  FACILITY NAME UR FAX NUMBER   ADMISSION DENIALS (Administrative/Medical Necessity) [phone]   DISCHARGE SUPPORT TEAM (NETWORK) [phone]   PARENT CHILD HEALTH (Maternity/NICU/Pediatrics) [phone]   [facility] [phone]   [facility] [phone]   Critical access hospital [phone]   [facility] [phone]   [facility] [phone]   [facility] [phone]   [facility] [phone]   [facility] [phone]   [facility] [phone]   [facility] [phone]   [facility] [phone]   [facility] [phone]

## 2024-02-26 NOTE — PROGRESS NOTES
Spiritual Care Progress Note    2024  Patient: Carla Hunt : 1965  Admission Date & Time: 1/10/2024 1941  MRN: [id number] CSN: [id number]       attempted to visit with pt. Pt currently working with staff at this time.    Chaplains still remain available.       24 1600   Clinical Encounter Type   Visited With Patient and family together

## 2024-02-26 NOTE — QUICK NOTE
2/26/2024 4:09 PM -  Carla Hunt's chart and case were reviewed by Hannah Ramirez PA-C.  Mode of review included electronic chart check, and communication with primary team.    Per review, primary team had the opportunity to speak with family earlier than anticipated family meeting and at that time provided medical update, anticipatory guidance, and support to family's satisfaction. Palliative care will return on 2/27 if appropriate.       For urgent issues or any questions/concerns, please notify on-call provider via [employer].  You may also call our answering service 24/7 at [phone].    Hannah Ramirez PA-C  Palliative and Supportive Care  Clinic/Answering Service: [phone]  You can find me on [employer]!

## 2024-02-26 NOTE — PROGRESS NOTES
Adult Nutrition  Assessment/PES    Patient Name:  Richelle Morelos  YOB: 1954  MRN: [id number]  Admit Date:  7/10/2018    Assessment Date:  7/11/2018    Patient screened for low BMI (17.1). Patient reported about 10# unintentional weight loss in the past 6 months due to decreased po intake. Mild malnutrition.  Provided nutrition therapy-encouraged adequate po intake. Patient stated she drinks nutrition supplements-ordered BID.          Reason for Assessment     Row Name 07/11/18 1353          Reason for Assessment    Reason For Assessment identified at risk by screening criteria     Diagnosis   Primary Problem:  Rotator cuff tear arthropathy of right shoulder      Identified At Risk by Screening Criteria BMI;reduced oral intake over the last month               Anthropometrics     Row Name 07/11/18 1357          Usual Body Weight (UBW)    Weight Loss unintentional     Weight Loss Time Frame 10# in the past 6 months        Body Mass Index (BMI)    BMI Assessment BMI 17-18.4: protein-energy malnutrition grade I             Labs/Tests/Procedures/Meds     Row Name 07/11/18 0310          Labs/Procedures/Meds    Lab Results Reviewed reviewed, pertinent        Diagnostic Tests/Procedures    Diagnostic Test/Procedure Reviewed reviewed, pertinent        Medications    Pertinent Medications Reviewed reviewed, pertinent             Physical Findings     Row Name 07/11/18 1355          Physical Findings    Overall Physical Appearance loss of muscle mass;loss of subcutaneous fat;underweight;on oxygen therapy   B=20             Estimated/Assessed Needs     Row Name 07/11/18 5954          Calculation Measurements    Weight Used For Calculations 42.6 kg (93 lb 14.7 oz)        Estimated/Assessed Needs    Additional Documentation KCAL/KG (Group);Protein Requirements (Group);Fluid Requirements (Group)        KCAL/KG                                                                kcal/kg (Specify) --   4538-7548         Progress Note - Infectious Disease   Carla Hunt 59 y.o. female MRN: [id number]  [id number]      Impression/Recommendations:  Possible bacterial pneumonia.  Initially, on admission early January, secondary to mostly COVID but there was concern for concurrent bacterial pneumonia on admission due to elevated procalcitonin. Patient completed treatment course for both COVID and bacterial pneumonia.  She was clinically improved with decreasing O2 requirement.  However, patient deteriorated in late January, requiring to be placed back on high flow O2 support.  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  Procalcitonin x 3 were in the normal range.  Patient has no fever or leukocytosis.  She improved with increasing steroid dose.  She completed a 5-day course of Bactrim/minocycline for presumptive stenotrophomonas respiratory infection, with expectorated sputum growing stenotrophomonas.  She subsequently had bronchoscopy, with BAL culture negative for bacterial growth, AFB and PCP but completed the course of Bactrim/minocycline prior to bronchoscopy.  Patient improved and O2 was being weaned again until 2 weeks ago, when she deteriorated again.  She is now back in ICU.  Expectorated sputum once again is growing stenotrophomonas.  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  Not sure that the stenotrophomonas that is growing again and expectorated sputum is pathogenic versus upper airway colonization.  Patient was restarted on high-dose steroid and Bactrim.  Patient's respiratory status remained tenuous throughout, required intubation subsequently.  She had bronchoscopy on 2/16 with moderate secretion.  BAL culture had no growth but COVID PCR was positive.  Bactrim was changed to Levaquin over weekend due to ELIESER.  Remdesivir was restarted for possible COVID relapse.  Due to worsening consolidations on CT,  Edmunds-St. Jeor Equation    RMR (Edmunds-St. Jeor Equation) 934.38        Protein Requirements    Est Protein Requirement Amount (gms/kg) 1.2 gm protein     Estimated Protein Requirements (gms/day) 51.12        Fluid Requirements    Estimated Fluid Requirements (mL/day) 1200     RDA Method (mL) 1200     [place]-Segar Method (over 20 kg) 2352             Nutrition Prescription Ordered     Row Name 07/11/18 1351          Nutrition Prescription PO    Current PO Diet Regular             Evaluation of Received Nutrient/Fluid Intake     Row Name 07/11/18 1351          Calculation Measurements    Weight Used For Calculations 42.6 kg (93 lb 14.7 oz)             Evaluation of Prescribed Nutrient/Fluid Intake     Row Name 07/11/18 1351          Calculation Measurements    Weight Used For Calculations 42.6 kg (93 lb 14.7 oz)             Malnutrition Severity Assessment     Row Name 07/11/18 1351          Malnutrition Severity Assessment    Malnutrition Type Social/Environmental Circumstance Malnutrition        Physical Signs of Malnutrition (Social/Environmental)    Muscle Wasting Mild     Fat Loss Mild        Weight Status (Social/Environmental)    BMI Mild (<19)   BMI-17.1     %IBW Mod (<80%)   79% IBW     Weight Loss Severe (>10% / 6 mo)   12% weight loss in the past 6 months        Energy Intake Status (Social/Environmental)    Energy Intake Mod (<75% / > or equal to 3 mo)        Criteria Met (Must meet criteria for severity in at least 2 of these categories: M Wasting, Fat Loss, Fluid, Secondary Signs, Wt. Status, Intake)    Patient meets criteria for  Moderate malnutrition         Problem/Interventions:        Problem 1     Row Name 07/11/18 1352          Nutrition Diagnoses Problem 1    Problem 1 Malnutrition     Etiology (related to) MNT for Treatment/Condition     Signs/Symptoms (evidenced by) Report of Mnimal PO Intake;Unintended Weight Change     Unintended Weight Change Loss     Number of Pounds Lost 10#      patient is status post repeat bronchoscopy on 2/21, with some purulent secretion.  BAL culture had light growth of Pseudomonas and stenotrophomonas.  Given that patient's Pseudomonas isolate is susceptible to Levaquin and stenotrophomonas isolate is susceptible to both Bactrim and Levaquin and with patient having been on Levaquin for the last 7 days (and Bactrim for the prior 10 days), both Pseudomonas and stenotrophomonas isolates are most likely airway colonization.  Growth of Candida is most likely airway colonization also.  Given deterioration overnight, will continue Levaquin for now.  Continue Levaquin.    Restart high-dose systemic corticosteroid per critical care service  Monitor temperature/WBC.     Severe COVID, present on admission.  Patient was treated with a 10-day course of remdesivir, dexamethasone and was given 1 dose of Tocilizumab on admission.  She also had a course of antibiotic initially for presumptive bacterial superinfection.  COVID antigen was negative prior to coming off isolation.  Due to positive COVID PCR in BAL, patient completed another 5-day course of remdesivir.  Patient has remained on systemic corticosteroid throughout her hospitalization.  No further antiviral necessary.     Acute hypoxic respiratory failure, likely multifactorial, with both COVID and bacterial ammonia contributing, with patient back on ventilator.  Patient was slowly improving but deteriorated over the last 24 hours.  She is requiring increasing ventilator support and pressor support.  The last 2 times that she deteriorated was when steroid was being weaned.  With her current steroid being weaned over the last week, I suspect her deterioration overnight  is once again secondary to steroid weaning.  Patient will need to have her steroid dose increased again and she will likely need to stay on high-dose steroid for a more extended period of time prior to weaning.  Ventilator support and weaning per critical care  Weight loss time period 6 months                     Intervention Goal     Row Name 07/11/18 1353          Intervention Goal    General Maintain nutrition;Meet nutritional needs for age/condition     PO Tolerate PO;Increase intake     Weight Appropriate weight gain             Nutrition Intervention     Row Name 07/11/18 1353          Nutrition Intervention    RD/Tech Action Interview for preference;Follow Tx progress;Care plan reviewd;Encourage intake;Recommend/ordered;Supplement provided     Recommended/Ordered Supplement             Nutrition Prescription     Row Name 07/11/18 1353          Nutrition Prescription PO    PO Prescription Begin/change supplement     Supplement Ensure     Supplement Frequency 2 times a day     New PO Prescription Ordered? Yes             Education/Evaluation     Row Name 07/11/18 1353          Education    Education Will Instruct as appropriate        Monitor/Evaluation    Monitor Per protocol     Education Follow-up Reinforce PRN         Electronically signed by:  Erma Fam RD  07/11/18 1:53 PM   medicine service.  Recommend increased steroid dose and maintain high-dose steroid for a more extended period of time.  Continue current antibiotic regimen, without change.  Check blood cultures to assess for possible catheter related bloodstream infection.     CKD.  Creatinine worsened on Bactrim.  Patient is now off Bactrim.  Creatinine has improved.  Monitor creatinine.     5. 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  Patient is status post exploratory laparotomy with ileocecectomy and end ileostomy creation.  Surgery follow-up.     B-cell lymphoma, on chemotherapy, which is currently postponed.  Patient was leukopenic on admission but resolved.  Monitor WBC/ANC.     Discussed with patient's  and family in detail regarding the above plan  Discussed with Theresa Robertson and Remedios from critical care medicine service regarding steroid weaning as probable etiology of current respiratory decline and probable need to increase steroid dosage again.  They are in agreement and will increase systemic corticosteroid.     Antibiotics:  Levaquin # 8  Antibiotic # 17  Off remdesivir     Subjective:  Patient deteriorated overnight.  Respiratory status and O2 saturation decreased, requiring increased Impella support.  Temperatures up.  SBP decreasing, currently requiring pressors.  She remains intubated and sedated  Temperature is up over the last 24 hours.  She is tolerating antibiotic well.  No nausea, vomiting or diarrhea.    Objective:  Vitals:  Temp:  [97.9 °F (36.6 °C)-101.7 °F (38.7 °C)] 101.7 °F (38.7 °C)  HR:  [100-163] 122  Resp:  [17-43] 21  BP: ()/(49-92) 107/61  SpO2:  [81 %-100 %] 100 %  Temp (24hrs), Av.8 °F (37.7 °C), Min:97.9 °F (36.6 °C), Max:101.7 °F (38.7 °C)  Current: Temperature: (!) 101.7 °F (38.7 °C)    Physical Exam:     General: Sedated on ventilator.  No response to verbal stimuli.  Comfortable.  Nontoxic.   Neck:  Supple. No mass.  No lymphadenopathy.   Lungs: Expansion  symmetric, no rales, no wheezing, respirations unlabored.   Heart:  Regular rate and rhythm, S1 and S2 normal, no murmur.   Abdomen: Soft, nondistended, non-tender, bowel sounds active all four quadrants, no masses, no organomegaly.   Extremities: Stable mild leg edema. No erythema/warmth. No ulcer. Nontender to palpation.   Skin:  No rash.   Neuro: Not assessable.     Invasive Devices       Central Venous Catheter Line  Duration             CVC Central Lines 02/20/24 5 days              Peripheral Intravenous Line  Duration             Peripheral IV 02/23/24 Right Antecubital 3 days              Arterial Line  Duration             Arterial Line 02/14/24 Radial 12 days              Drain  Duration             Urethral Catheter Latex 16 Fr. 6 days    Ileostomy RUQ 5 days    NG/OG/Enteral Tube Nasogastric 18 Fr Left nare 5 days              Airway  Duration             ETT  Cuffed 7.5 mm 12 days                    Labs studies:   I have personally reviewed pertinent labs.  Results from last 7 days   Lab Units 02/26/24  0130 02/25/24  2301 02/25/24  2300 02/25/24  1942 02/20/24  1759 02/20/24  1523 02/20/24  1515 02/20/24  1412 02/20/24  1010   POTASSIUM mmol/L 3.3* 3.9  --  3.5   < >  --   --   --  6.1*   CHLORIDE mmol/L 111* 108  --  112*   < >  --   --   --  113*   CO2 mmol/L 33* 39*  --  32   < >  --   --   --  27   CO2, I-STAT mmol/L  --   --  38*  --   --  23 24   < >  --    BUN mg/dL 30* 32*  --  32*   < >  --   --   --  95*   CREATININE mg/dL 0.50* 0.62  --  0.51*   < >  --   --   --  1.62*   EGFR ml/min/1.73sq m 107 99  --  106   < >  --   --   --  34   GLUCOSE, ISTAT mg/dl  --   --  176*  --   --  195* 209*   < >  --    CALCIUM mg/dL 9.3 9.9  --  9.8   < >  --   --   --  11.8*   AST U/L  --  10*  --   --   --   --   --   --  20   ALT U/L  --  17  --   --   --   --   --   --  18   ALK PHOS U/L  --  68  --   --   --   --   --   --  68    < > = values in this interval not displayed.     Results from last 7 days    Lab Units 02/25/24  2300 02/25/24  0419 02/24/24  0413 02/23/24  0602 02/22/24  0436   WBC Thousand/uL  --  8.19 10.30* 10.61* 23.58*   HEMOGLOBIN g/dL  --  10.0* 10.0* 9.3* 11.4*   I STAT HEMOGLOBIN g/dl 9.9*  --   --   --   --    PLATELETS Thousands/uL  --  103* 98*  --  237     Results from last 7 days   Lab Units 02/21/24  1713 02/21/24  1710 02/21/24  1704   GRAM STAIN RESULT  2+ Polys*  1+ Yeast* Rare Polys  No bacteria seen Rare Polys  No bacteria seen       Imaging Studies:   I have personally reviewed pertinent imaging study reports and images in PACS.    EKG, Pathology, and Other Studies:   I have personally reviewed pertinent reports.

## 2024-02-26 NOTE — PROGRESS NOTES
[facility]  Progress Note: Critical Care  Name: Carla Hunt 59 y.o. 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  Thrombocytopenia, suspected to be due to sepsis or possibly transfusion  Hyponatremia  B-cell lymphoma with history of treatment with Rituxan  Minimal SAH with no neurosurgical intervention indicated at the time  Seizure disorder s/p left temporal lobe partial resection  Steroid-induced hyperglycemia  Sacral ulcers bilaterally  Hypertriglyceridemia    Neuro:   Sedation: Off versed, On precedex drip 0.8, propofol drip at 30, dilaudid drip at 1.5  -Aim to maintain RASS 0, currently between RASS 0 to -1   -Repeat triglyceride level at 305 from 02/20, repeat triglyceride check from this morning pending  -Continue to aim to wean sedation for possibility of SBT/extubation     Diagnosis: seizure disorder  -S/p partial left temporal lobe resection in 2007  -On Lamictal 150 bid and Topamax 100 bid     Diagnosis: Anxiety  -On venlafaxine 25 mg TID via NGT  -Was on zyprexa 5 mg qHS, now dc'd  -Gabapentin 200 mg TID     CV:   Diagnosis: Distributive shock, systolics in the 90s-110s  -On levophed 12  -On vasopressin 0.04  -Maintain MAPs > 65 mmHg     Pulm:  Diagnosis: Acute hypoxic respiratory failure due to severe covid  and covid induced fibrosis  -Tested COVID positive on 1/7  -Completed severe COVID pathway and 7 days CTX  -Tenuous respiratory status requiring multiple re-admissions to MICU  -S/p Bronch  2/2 and 02/16  -NG on cultures (initially showed non-group A strep)  -PCP and AFB negative   -Legionella, viral, fungal cultures no growth to date  -Pulse dose steroids with solumedrol 1g daily x3 days (completed 2/7) then transitioned to prednisone 80mg daily on 2/8  -Off veletri  -Completed IVIG for 5 days  -CRP trend 221 (02/19) > 116.9 (02/20) > 94.2 (02/20) > 335.8 (02/21). Limited benefit in trending CRP at this time since it maybe elevated due to multiple other factors including recent volvulus and surgery. Will plan to continue with current solumedrol dosing and monitor patients respiratory status clinical and aim to wean vent FiO2 as tolerated  -CT scan 2/24: general improvement of areas of consolidation and some areas of groundglass opacification on the lungs, still with significant groundglass opacification especially in the lower lobes. Bilateral consolidations in lower lobes. No evidence of PE.  -Was on Solumedrol 40 mg Q12H and weaned to solumedrol 40 mg daily. However, with increasing FiO2 and hypoxia along with needs for increases in vasopressors, may have to consider going back to solumedrol 125 m,g Q12H  -Vent settings: PCMV RR20, 14pcontrol, 15 PEEP, 100% FiO2    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  -Monitor output of ostomy pouch and Hemoglobin  -Now on tube feeds  -CT 2/24: Stomach significantly dilated and full w/ tube feed material  -Hold off tube feeds at this time, on OG tube set to suction  -Reglan 5 mg TID  -QTc monitoring     -On famotidine 20 mg for GI prophylaxis      :   Diagnosis: CKD III  -Baseline Cr appears to be 0.8-1.2  -UA unremarkable   -Upon chart review 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 Her base free water deficit would be 3.4L  -Did get 1L isolyte bolus in the night  -Consider D5W infusion with Q4H BMPs    Diagnosis: Acute kidney injury, sCr at 0.99, resolved        F/E/N:   F: Off IVF  E: monitor and replete for goal K>4, P>3, Mg>2  N: On tube feeds with glucerna     Heme/Onc:      Diagnosis: Thrombocytopenia  -Platelets this morning at 97, were previously 230s ranges  -Will continue to monitor  -Could be in the setting of transfusions or sepsis. It may also be dilutional since she has been on IV fluids for past 24 hours  -Monitor sites for bleeding     Diagnosis: B cell lymphoma  -Follows with heme/onc at [facility]  -On rituxan for 2 year course, started May 2022  -Last dose was 12/20, treatment currently on hold   -Leukopenic on admission but WBC now wnl     DVT ppx with lovenox     Endo:   Diagnosis: steroid hyperglycemia and diabetes mellitus type 2, A1c at 6.6 from 01/2024  -Goal -180  -BG range last 24hrs 129-178  -On insulin drip     ID:   Diagnosis: Severe covid pneumonia and stenotrophomonas pneumonia  -Completed severe COVID pathway with decadron (ended 1/22) and remdesivir (ended 1/20), also completed 7 days CTX on 1/15  -C/f opportunistic infections given immunocompromised status on chemotherapy and recent steroid use  -No consolidations on CXR and procal negative  -S/p bactrim and minocycline x5 days for stenotrophomonas on sputum culture (1/25), then on bactrim for PJP ppx  -Bronc on 2/2 - Cx w/o growth (initially resulted as 1+ alpha hemolytic strep), AFB & PCP negative, f/u fungal, legionella, and viral cultures   -2/10 pt again had escalating O2 requirements on floors necessitating readmission to ICU  -Concern that with recent pulse dose steroids and now worsening respiratory status she could have infection, possibly opportunistic   -UA with moderate leukocytes, nitrite negative, 30-50 WBC, trace protein.  -Repeat sputum culture 2/9 with 4+  stenotrophomonas  -Bronch cultures with candida albicans, Rare gram positive cocci in pairs  -Was on high dose bactrim, received 10 days of this. Was also on vancomycin and cefepime. As per ID recommendations from 02/221, now off all three and switched to levaquin 750 mg IV ever 48 hours, Plan to continue for 5 days.  -As per ID recommendations, on remdesevir for 5 days  -On levaquin 750 mg, day 7/7     MSK/Skin:   -Wound care team following for bilateral sacral wounds      Disposition: Critical care    ICU Core Measures     Vented Patient  VAP Bundle  VAP bundle ordered     A: Assess, Prevent, and Manage Pain Has pain been assessed? Yes  Need for changes to pain regimen? No   B: Both Spontaneous Awakening Trials (SATs) and Spontaneous Breathing Trials (SBTs) Plan to perform spontaneous awakening trial today? Modified and patient remained on precedex   Plan to perform spontaneous breathing trial today? Modified and patient remained on precedex   Obvious barriers to extubation? Yes   C: Choice of Sedation RASS Goal: -1 Drowsy  Need for changes to sedation or analgesia regimen? No   D: Delirium CAM-ICU: Negative   E: Early Mobility  Plan for early mobility? Yes   F: Family Engagement Plan for family engagement today? Yes       Review of Invasive Devices:    [place] Plan: Continue for accurate I/O monitoring for 48 hours  Central access plan: Medications requiring central line  [place] Plan: Keep arterial line for hemodynamic monitoring and frequent ABGs    Prophylaxis:  VTE VTE covered by:  enoxaparin, Subcutaneous, 40 mg at 02/25/24 0848       Stress Ulcer  not ordered        Significant 24hr Events     24hr events: Throughout thenight, patient was hypoxic low 90s or high 80s, then suddenly she became tachycardic, Bps with systolics to 40s, Concern for acute PE. Got an amp of bicarb, 1L isolyte, CXR+KUB, CT CAP with contrast and everything same as before, no PE. 100% FIO2, 15 PEEP, still saturating in low 90s. 15 levo,  on vaso.   Did get 1L isolyte bolus overnight.     Subjective   Patient seen by bedside, will discuss medical updates and plan with patients family today    Review of Systems   Unable to perform ROS: Patient nonverbal        Objective                            Vitals I/O      Most Recent Min/Max in 24hrs   Temp (!) 100.9 °F (38.3 °C) Temp  Min: 97.3 °F (36.3 °C)  Max: 100.9 °F (38.3 °C)   Pulse (!) 107 Pulse  Min: 70  Max: 163   Resp 20 Resp  Min: 16  Max: 43   /76 BP  Min: 80/56  Max: 156/92   O2 Sat 97 % SpO2  Min: 81 %  Max: 100 %   On insulin dripn 0.5, Dialudid drip at 1.5, Precedex drip at 0.8, Levophed at 12, Vaso  drip at 0.04,     Vent settings: PCMV RR 20, PEEP 15, 100%FiO2   Intake/Output Summary (Last 24 hours) at 2/26/2024 0740  Last data filed at 2/26/2024 0629  Gross per 24 hour   Intake 2530.8 ml   Output 1930 ml   Net 600.8 ml       Diet Enteral/Parenteral; Tube Feeding No Oral Diet; Glucerna 1.2; Continuous; 20; 150; Water; Every 4 hours    Invasive Monitoring   Arterial Line  Amelia /69  Arterial Line BP  Min: 57/37  Max: 207/88   MAP 88 mmHg  Arterial Line MAP (mmHg)  Min: 44 mmHg  Max: 120 mmHg           Physical Exam   Physical Exam  Vitals reviewed.   Skin:     Capillary Refill: Capillary refill takes less than 2 seconds.      Comments: Skin cold to touch in all extremities.    HENT:      Head: Normocephalic.   Cardiovascular:      Rate and Rhythm: Regular rhythm. Tachycardia present.      Pulses: Normal pulses.   Abdominal: General: Bowel sounds are normal.      Palpations: Abdomen is soft.   Constitutional:       Appearance: She is ill-appearing.      Interventions: She is sedated and intubated.      Comments: Has a ETT with NGT set to suction, Has an arterial line on left hand, PIV on right hand. CVC and marie noted. Ostomy pouch with dark brown stool.    Pulmonary:      Effort: Pulmonary effort is normal. She is intubated.      Comments: Mechanically ventilated breath  sounds  Neurological:      Comments: Patient appears to be sedated and nonverbal at this time, unable to follow commands.    Genitourinary/Anorectal:  Ortiz present.          Diagnostic Studies      EKG: Reviewed  Imaging: Reviewed I have personally reviewed pertinent reports.       Medications:  Scheduled PRN   chlorhexidine, 15 mL, Q12H SARTHAK  enoxaparin, 40 mg, Q24H SARTHAK  gabapentin, 200 mg, TID  ipratropium, 0.5 mg, TID  lamoTRIgine, 150 mg, BID  levalbuterol, 1.25 mg, TID  metoclopramide, 5 mg, Q8H  nystatin, , BID  oxyCODONE, 5 mg, Q8H  polyethylene glycol, 17 g, Daily  potassium chloride, 40 mEq, Once  predniSONE, 50 mg, Daily  senna, 1 tablet, BID  topiramate, 100 mg, BID  venlafaxine, 25 mg, TID With Meals      acetaminophen, 1,000 mg, Q8H PRN  hydrALAZINE, 10 mg, Q6H PRN  HYDROmorphone, 0.5 mg, Q1H PRN  midazolam, 2 mg, Q4H PRN  ondansetron, 4 mg, Q6H PRN       Continuous    dexmedetomidine, 0.1-1.2 mcg/kg/hr, Last Rate: 0.8 mcg/kg/hr (02/26/24 0629)  HYDROmorphone, 1.5 mg/hr, Last Rate: 1.5 mg/hr (02/25/24 2035)  insulin regular (HumuLIN R,NovoLIN R) 1 Units/mL in sodium chloride 0.9 % 100 mL infusion, 0.3-21 Units/hr, Last Rate: 0.5 Units/hr (02/26/24 0412)  norepinephrine, 1-30 mcg/min, Last Rate: 12 mcg/min (02/26/24 0631)  propofol, 5-50 mcg/kg/min, Last Rate: 30 mcg/kg/min (02/26/24 0535)  vasopressin, 0.04 Units/min, Last Rate: 0.04 Units/min (02/26/24 0443)         Labs:    CBC    Recent Labs     02/25/24 0419 02/25/24  2300   WBC 8.19  --    HGB 10.0* 9.9*   HCT 31.3* 29*   *  --    BANDSPCT 8  --      BMP    Recent Labs     02/25/24  2301 02/26/24  0130   SODIUM 155* 153*   K 3.9 3.3*    111*   CO2 39* 33*   AGAP 8 9   BUN 32* 30*   CREATININE 0.62 0.50*   CALCIUM 9.9 9.3       Coags    No recent results     Additional Electrolytes  Recent Labs     02/25/24  0419 02/25/24  2300 02/25/24  2301   MG 1.7*  --   --    CAIONIZED  --  1.39* 1.31          Blood Gas    Recent Labs      02/25/24  0419   PHART 7.429   SYT8RUU 47.8*   PO2ART 88.9   UEU2YLL 30.9*   BEART 5.8   SOURCE Line, Arterial     Recent Labs     02/25/24  0419 02/26/24  0728   PHVEN  --  7.288*   MPW1FLW  --  71.2*   PO2VEN  --  46.5*   [id number]  --  33.3*   BEVEN  --  4.8   [id number]  --  77.3   SOURCE Line, Arterial  --     LFTs  Recent Labs     02/25/24 2301   ALT 17   AST 10*   ALKPHOS 68   ALB 2.4*   TBILI 0.70       Infectious  No recent results  Glucose  Recent Labs     02/25/24  1646 02/25/24  1942 02/25/24  2301 02/26/24  0130   GLUC 95 128 170* 135               Miguel Whittaker MD

## 2024-02-26 NOTE — CASE MANAGEMENT
Case Management Discharge Planning Note    Patient name Carla Hunt  [id number]  : 1965 Date 2024       Current Admission Date: 1/10/2024  Current Admission Diagnosis:Acute respiratory failure with hypoxia (HCC)   Patient Active Problem List    Diagnosis Date Noted    Acute kidney injury (HCC) 2024    Cecal volvulus (HCC) 2024    Palliative care patient 2024    Goals of care, counseling/discussion 2024    Hypotension 2024    Seizure disorder (HCC) 2024    Acute respiratory failure with hypoxia (HCC) 2024    Transaminitis 2024    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    COVID 2024    Stage 3b chronic kidney disease (CKD) ([facility]) 2024    Abnormal CT of the head 2024    Nephrolithiasis 2021    Other specified anxiety disorders 2019    Reactive depression 2019    Hidradenitis suppurativa of left axilla 2019    Anxiety state 2018    Disorder of parathyroid gland ([facility]) 2018    Eczema 2018    Grand mal status ([facility]) 2018    Hypercalcemia 2018    Hypertensive disorder 2018    Open wound of hand except fingers 2018    Otitis externa 2018    Overweight 2018    Pain in face 2018    Skin sensation disturbance 2018    Subjective visual disturbance 2018    Long term current use of hormonal contraceptive 10/23/2018    Rosacea 2015      LOS (days): 47  Geometric Mean LOS (GMLOS) (days):   Days to GMLOS:     OBJECTIVE:  Risk of Unplanned Readmission Score: 25.25         Current admission status: Inpatient   Preferred Pharmacy:   [id number] [place] - [address]  [place] [address]  Phone: [phone] Fax: [phone]    [facility] - [place], [place] - [address]  [address] [address]  Phone: [phone] Fax:  [phone]    Primary Care Provider: Tony Guevara MD    Primary Insurance: MEDICARE  Secondary Insurance: INTER GROUP    DISCHARGE DETAILS:              Additional Comments: Per chart review:  palliative following, day 13 of intubation.  + Covid.On precedex/propofol/dilaudid drips.  CM to continue to follow for dcp.

## 2024-02-26 NOTE — PLAN OF CARE
Problem: Prexisting or High Potential for Compromised Skin Integrity  Goal: Skin integrity is maintained or improved  Description: INTERVENTIONS:  - Identify patients at risk for skin breakdown  - Assess and monitor skin integrity  - Assess and monitor nutrition and hydration status  - Monitor labs   - Assess for incontinence   - Turn and reposition patient  - Assist with mobility/ambulation  - Relieve pressure over bony prominences  - Avoid friction and shearing  - Provide appropriate hygiene as needed including keeping skin clean and dry  - Evaluate need for skin moisturizer/barrier cream  - Collaborate with interdisciplinary team   - Patient/family teaching  - Consider wound care consult   Outcome: Progressing     Problem: INFECTION - ADULT  Goal: Absence or prevention of progression during hospitalization  Description: INTERVENTIONS:  - Assess and monitor for signs and symptoms of infection  - Monitor lab/diagnostic results  - Monitor all insertion sites, i.e. indwelling lines, tubes, and drains  - Monitor endotracheal if appropriate and nasal secretions for changes in amount and color  - [place] appropriate cooling/warming therapies per order  - Administer medications as ordered  - Instruct and encourage patient and family to use good hand hygiene technique  - Identify and instruct in appropriate isolation precautions for identified infection/condition  Outcome: Progressing     Problem: SAFETY ADULT  Goal: Patient will remain free of falls  Description: INTERVENTIONS:  - Educate patient/family on patient safety including physical limitations  - Instruct patient to call for assistance with activity   - Consult OT/PT to assist with strengthening/mobility   - Keep Call bell within reach  - Keep bed low and locked with side rails adjusted as appropriate  - Keep care items and personal belongings within reach  - Initiate and maintain comfort rounds  - Make Fall Risk Sign visible to staff  - Offer Toileting every  2 Hours, in advance of need  - Initiate/Maintain bed alarm  - Obtain necessary fall risk management equipment: non skid footwear  - Apply yellow socks and bracelet for high fall risk patients  - Consider moving patient to room near nurses station  Outcome: Progressing     Problem: RESPIRATORY - ADULT  Goal: Achieves optimal ventilation and oxygenation  Description: INTERVENTIONS:  - Assess for changes in respiratory status  - Assess for changes in mentation and behavior  - Position to facilitate oxygenation and minimize respiratory effort  - Oxygen administered by appropriate delivery if ordered  - Initiate smoking cessation education as indicated  - Encourage broncho-pulmonary hygiene including cough, deep breathe, Incentive Spirometry  - Assess the need for suctioning and aspirate as needed  - Assess and instruct to report SOB or any respiratory difficulty  - Respiratory Therapy support as indicated  Outcome: Progressing     Problem: SKIN/TISSUE INTEGRITY - ADULT  Goal: Skin Integrity remains intact(Skin Breakdown Prevention)  Description: Assess:  -Perform Flavio assessment every shift   -Clean and moisturize skin every day   -Inspect skin when repositioning, toileting, and assisting with ADLS  -Assess under medical devices such as ronny  every hour   -Assess extremities for adequate circulation and sensation     Bed Management:  -Have minimal linens on bed & keep smooth, unwrinkled  -Change linens as needed when moist or perspiring  -Avoid sitting or lying in one position for more than 2 hours while in bed  -Keep HOB at 45 degrees     Toileting:  -Offer bedside commode  -Assess for incontinence every hour  -Use incontinent care products after each incontinent episode such as moisture barrier cream     Activity:  -Mobilize patient 3 times a day  -Encourage activity and walks on unit  -Encourage or provide ROM exercises   -Turn and reposition patient every 2 Hours  -Use appropriate equipment to lift or move  patient in bed  -Instruct/ Assist with weight shifting every hour when out of bed in chair  -Consider limitation of chair time 2 hour intervals    Skin Care:  -Avoid use of baby powder, tape, friction and shearing, hot water or constrictive clothing  -Relieve pressure over bony prominences using allevyn   -Do not massage red bony areas    Next Steps:  -Teach patient strategies to minimize risks such as weight shifting    -Consider consults to  interdisciplinary teams such as PT  Outcome: Progressing  Goal: Incision(s), wounds(s) or drain site(s) healing without S/S of infection  Description: INTERVENTIONS  - Assess and document dressing, incision, wound bed, drain sites and surrounding tissue  - Provide patient and family education  Outcome: Progressing  Goal: Pressure injury heals and does not worsen  Description: Interventions:  - Implement low air loss mattress or specialty surface (Criteria met)  - Apply silicone foam dressing  - Consider nutrition services referral as needed  Outcome: Progressing     Problem: Potential for Falls  Goal: Patient will remain free of falls  Description: INTERVENTIONS:  - Educate patient/family on patient safety including physical limitations  - Instruct patient to call for assistance with activity   - Consult OT/PT to assist with strengthening/mobility   - Keep Call bell within reach  - Keep bed low and locked with side rails adjusted as appropriate  - Keep care items and personal belongings within reach  - Initiate and maintain comfort rounds  - Make Fall Risk Sign visible to staff  - Offer Toileting every 2 Hours, in advance of need  - Initiate/Maintain bed alarm  - Obtain necessary fall risk management equipment: non skid footwear  - Apply yellow socks and bracelet for high fall risk patients  - Consider moving patient to room near nurses station  Outcome: Progressing     Problem: Nutrition/Hydration-ADULT  Goal: Nutrient/Hydration intake appropriate for improving, restoring or  maintaining nutritional needs  Description: Monitor and assess patient's nutrition/hydration status for malnutrition. Collaborate with interdisciplinary team and initiate plan and interventions as ordered.  Monitor patient's weight and dietary intake as ordered or per policy. Utilize nutrition screening tool and intervene as necessary. Determine patient's food preferences and provide high-protein, high-caloric foods as appropriate.     INTERVENTIONS:  - Monitor oral intake, urinary output, labs, and treatment plans  - Assess nutrition and hydration status and recommend course of action  - Evaluate amount of meals eaten  - Assist patient with eating if necessary   - Allow adequate time for meals  - Recommend/ encourage appropriate diets, oral nutritional supplements, and vitamin/mineral supplements  - Order, calculate, and assess calorie counts as needed  - Recommend, monitor, and adjust tube feedings and TPN/PPN based on assessed needs  - Assess need for intravenous fluids  - Provide specific nutrition/hydration education as appropriate  - Include patient/family/caregiver in decisions related to nutrition  Outcome: Progressing     Problem: SAFETY,RESTRAINT: NV/NON-SELF DESTRUCTIVE BEHAVIOR  Goal: Remains free of harm/injury (restraint for non violent/non self-detsructive behavior)  Description: INTERVENTIONS:  - Instruct patient/family regarding restraint use   - Assess and monitor physiologic and psychological status   - Provide interventions and comfort measures to meet assessed patient needs   - Identify and implement measures to help patient regain control  - Assess readiness for release of restraint   Outcome: Progressing  Goal: Returns to optimal restraint-free functioning  Description: INTERVENTIONS:  - Assess the patient's behavior and symptoms that indicate continued need for restraint  - Identify and implement measures to help patient regain control  - Assess readiness for release of restraint   Outcome:  Progressing     Problem: COPING  Goal: Pt/Family able to verbalize concerns and demonstrate effective coping strategies  Description: INTERVENTIONS:  - Assist patient/family to identify coping skills, available support systems and cultural and spiritual values  - Provide emotional support, including active listening and acknowledgement of concerns of patient and caregivers  - Reduce environmental stimuli, as able  - Provide patient education  - Assess for spiritual pain/suffering and initiate spiritual care, including notification of Pastoral Care or [place] based community as needed  - Assess effectiveness of coping strategies  Outcome: Progressing  Goal: Will report anxiety at manageable levels  Description: INTERVENTIONS:  - Administer medication as ordered  - Teach and encourage coping skills  - Provide emotional support  - Assess patient/family for anxiety and ability to cope  Outcome: Progressing     Problem: BEHAVIOR  Goal: Pt/Family maintain appropriate behavior and adhere to behavioral management agreement, if implemented  Description: INTERVENTIONS:  - Assess the family dynamic   - Encourage verbalization of thoughts and concerns in a socially appropriate manner  - Assess patient/family's coping skills and non-compliant behavior (including use of illegal substances).  - Utilize positive, consistent limit setting strategies supporting safety of patient, staff and others  - Initiate consult with Case Management, Spiritual Care or other ancillary services as appropriate  - If a patient's/visitor's behavior jeopardizes the safety of the patient, staff, or others, refer to organization procedure.   - Notify Security of behavior or suspected illegal substances which indicate the need for search of the patient and/or belongings  - Encourage participation in the decision making process about a behavioral management agreement; implement if patient meets criteria  Outcome: Progressing     Problem: NEUROSENSORY -  ADULT  Goal: Achieves stable or improved neurological status  Description: INTERVENTIONS  - Monitor and report changes in neurological status  - Monitor vital signs such as temperature, blood pressure, glucose, and any other labs ordered   - Initiate measures to prevent increased intracranial pressure  - Monitor for seizure activity and implement precautions if appropriate      Outcome: Progressing  Goal: Achieves maximal functionality and self care  Description: INTERVENTIONS  - Monitor swallowing and airway patency with patient fatigue and changes in neurological status  - Encourage and assist patient to increase activity and self care.   - Encourage visually impaired, hearing impaired and aphasic patients to use assistive/communication devices  Outcome: Progressing     Problem: CARDIOVASCULAR - ADULT  Goal: Maintains optimal cardiac output and hemodynamic stability  Description: INTERVENTIONS:  - Monitor I/O, vital signs and rhythm  - Monitor for S/S and trends of decreased cardiac output  - Administer and titrate ordered vasoactive medications to optimize hemodynamic stability  - Assess quality of pulses, skin color and temperature  - Assess for signs of decreased coronary artery perfusion  - Instruct patient to report change in severity of symptoms  Outcome: Progressing  Goal: Absence of cardiac dysrhythmias or at baseline rhythm  Description: INTERVENTIONS:  - Continuous cardiac monitoring, vital signs, obtain 12 lead EKG if ordered  - Administer antiarrhythmic and heart rate control medications as ordered  - Monitor electrolytes and administer replacement therapy as ordered  Outcome: Progressing     Problem: GASTROINTESTINAL - ADULT  Goal: Minimal or absence of nausea and/or vomiting  Description: INTERVENTIONS:  - Administer IV fluids if ordered to ensure adequate hydration  - Maintain NPO status until nausea and vomiting are resolved  - Nasogastric tube if ordered  - Administer ordered antiemetic  medications as needed  - Provide nonpharmacologic comfort measures as appropriate  - Advance diet as tolerated, if ordered  - Consider nutrition services referral to assist patient with adequate nutrition and appropriate food choices  Outcome: Progressing  Goal: Maintains or returns to baseline bowel function  Description: INTERVENTIONS:  - Assess bowel function  - Encourage oral fluids to ensure adequate hydration  - Administer IV fluids if ordered to ensure adequate hydration  - Administer ordered medications as needed  - Encourage mobilization and activity  - Consider nutritional services referral to assist patient with adequate nutrition and appropriate food choices  Outcome: Progressing  Goal: Maintains adequate nutritional intake  Description: INTERVENTIONS:  - Monitor percentage of each meal consumed  - Identify factors contributing to decreased intake, treat as appropriate  - Assist with meals as needed  - Monitor I&O, weight, and lab values if indicated  - Obtain nutrition services referral as needed  Outcome: Progressing  Goal: Establish and maintain optimal ostomy function  Description: INTERVENTIONS:  - Assess bowel function  - Encourage oral fluids to ensure adequate hydration  - Administer IV fluids if ordered to ensure adequate hydration   - Administer ordered medications as needed  - Encourage mobilization and activity  - Nutrition services referral to assist patient with appropriate food choices  - Assess stoma site  - Consider wound care consult   Outcome: Progressing  Goal: Oral mucous membranes remain intact  Description: INTERVENTIONS  - Assess oral mucosa and hygiene practices  - Implement preventative oral hygiene regimen  - Implement oral medicated treatments as ordered  - Initiate Nutrition services referral as needed  Outcome: Progressing     Problem: GENITOURINARY - ADULT  Goal: Maintains or returns to baseline urinary function  Description: INTERVENTIONS:  - Assess urinary function  -  Encourage oral fluids to ensure adequate hydration if ordered  - Administer IV fluids as ordered to ensure adequate hydration  - Administer ordered medications as needed  - Offer frequent toileting  - Follow urinary retention protocol if ordered  Outcome: Progressing  Goal: Urinary catheter remains patent  Description: INTERVENTIONS:  - Assess patency of urinary catheter  - If patient has a chronic marie, consider changing catheter if non-functioning  - Follow guidelines for intermittent irrigation of non-functioning urinary catheter  Outcome: Progressing     Problem: METABOLIC, FLUID AND ELECTROLYTES - ADULT  Goal: Electrolytes maintained within normal limits  Description: INTERVENTIONS:  - Monitor labs and assess patient for signs and symptoms of electrolyte imbalances  - Administer electrolyte replacement as ordered  - Monitor response to electrolyte replacements, including repeat lab results as appropriate  - Instruct patient on fluid and nutrition as appropriate  Outcome: Progressing  Goal: Fluid balance maintained  Description: INTERVENTIONS:  - Monitor labs   - Monitor I/O and WT  - Instruct patient on fluid and nutrition as appropriate  - Assess for signs & symptoms of volume excess or deficit  Outcome: Progressing  Goal: Glucose maintained within target range  Description: INTERVENTIONS:  - Monitor Blood Glucose as ordered  - Assess for signs and symptoms of hyperglycemia and hypoglycemia  - Administer ordered medications to maintain glucose within target range  - Assess nutritional intake and initiate nutrition service referral as needed  Outcome: Progressing     Problem: HEMATOLOGIC - ADULT  Goal: Maintains hematologic stability  Description: INTERVENTIONS  - Assess for signs and symptoms of bleeding or hemorrhage  - Monitor labs  - Administer supportive blood products/factors as ordered and appropriate  Outcome: Progressing     Problem: MUSCULOSKELETAL - ADULT  Goal: Maintain or return mobility to safest  level of function  Description: INTERVENTIONS:  - Assess patient's ability to carry out ADLs; assess patient's baseline for ADL function and identify physical deficits which impact ability to perform ADLs (bathing, care of mouth/teeth, toileting, grooming, dressing, etc.)  - Assess/evaluate cause of self-care deficits   - Assess range of motion  - Assess patient's mobility  - Assess patient's need for assistive devices and provide as appropriate  - Encourage maximum independence but intervene and supervise when necessary  - Involve family in performance of ADLs  - Assess for home care needs following discharge   - Consider OT consult to assist with ADL evaluation and planning for discharge  - Provide patient education as appropriate  Outcome: Progressing

## 2024-02-27 LAB
ANION GAP SERPL CALCULATED.3IONS-SCNC: 5 MMOL/L
ANION GAP SERPL CALCULATED.3IONS-SCNC: 7 MMOL/L
ATRIAL RATE: 72 BPM
BASE EXCESS BLDA CALC-SCNC: 1.9 MMOL/L
BASE EXCESS BLDA CALC-SCNC: 2.2 MMOL/L
BASE EXCESS BLDA CALC-SCNC: 7.8 MMOL/L
BUN SERPL-MCNC: 31 MG/DL (ref 5–25)
BUN SERPL-MCNC: 32 MG/DL (ref 5–25)
CALCIUM SERPL-MCNC: 10.1 MG/DL (ref 8.4–10.2)
CALCIUM SERPL-MCNC: 10.4 MG/DL (ref 8.4–10.2)
CHLORIDE SERPL-SCNC: 113 MMOL/L (ref 96–108)
CHLORIDE SERPL-SCNC: 115 MMOL/L (ref 96–108)
CO2 SERPL-SCNC: 32 MMOL/L (ref 21–32)
CO2 SERPL-SCNC: 33 MMOL/L (ref 21–32)
CREAT SERPL-MCNC: 0.61 MG/DL (ref 0.6–1.3)
CREAT SERPL-MCNC: 0.61 MG/DL (ref 0.6–1.3)
CRP SERPL QL: 291.3 MG/L
ERYTHROCYTE [DISTWIDTH] IN BLOOD BY AUTOMATED COUNT: 19.9 % (ref 11.6–15.1)
FOLATE SERPL-MCNC: 9.9 NG/ML
GFR SERPL CREATININE-BSD FRML MDRD: 100 ML/MIN/1.73SQ M
GFR SERPL CREATININE-BSD FRML MDRD: 100 ML/MIN/1.73SQ M
GLUCOSE SERPL-MCNC: 120 MG/DL (ref 65–140)
GLUCOSE SERPL-MCNC: 133 MG/DL (ref 65–140)
GLUCOSE SERPL-MCNC: 135 MG/DL (ref 65–140)
GLUCOSE SERPL-MCNC: 136 MG/DL (ref 65–140)
GLUCOSE SERPL-MCNC: 147 MG/DL (ref 65–140)
GLUCOSE SERPL-MCNC: 149 MG/DL (ref 65–140)
GLUCOSE SERPL-MCNC: 160 MG/DL (ref 65–140)
GLUCOSE SERPL-MCNC: 176 MG/DL (ref 65–140)
GLUCOSE SERPL-MCNC: 177 MG/DL (ref 65–140)
GLUCOSE SERPL-MCNC: 207 MG/DL (ref 65–140)
GLUCOSE SERPL-MCNC: 215 MG/DL (ref 65–140)
GLUCOSE SERPL-MCNC: 269 MG/DL (ref 65–140)
HCO3 BLDA-SCNC: 28.5 MMOL/L (ref 22–28)
HCO3 BLDA-SCNC: 30.3 MMOL/L (ref 22–28)
HCO3 BLDA-SCNC: 34.1 MMOL/L (ref 22–28)
HCT VFR BLD AUTO: 24.5 % (ref 34.8–46.1)
HCT VFR BLD AUTO: 25.6 % (ref 34.8–46.1)
HGB BLD-MCNC: 7.1 G/DL (ref 11.5–15.4)
HGB BLD-MCNC: 7.3 G/DL (ref 11.5–15.4)
HGB BLD-MCNC: 7.7 G/DL (ref 11.5–15.4)
MAGNESIUM SERPL-MCNC: 2 MG/DL (ref 1.9–2.7)
MCH RBC QN AUTO: 30.2 PG (ref 26.8–34.3)
MCHC RBC AUTO-ENTMCNC: 29.8 G/DL (ref 31.4–37.4)
MCV RBC AUTO: 101 FL (ref 82–98)
MYCOBACTERIUM SPEC CULT: NORMAL
NRBC BLD AUTO-RTO: 1 /100 WBCS
O2 CT BLDA-SCNC: 10.1 ML/DL (ref 16–23)
O2 CT BLDA-SCNC: 10.2 ML/DL (ref 16–23)
O2 CT BLDA-SCNC: 11.2 ML/DL (ref 16–23)
OXYHGB MFR BLDA: 88.4 % (ref 94–97)
OXYHGB MFR BLDA: 94.5 % (ref 94–97)
OXYHGB MFR BLDA: 95.1 % (ref 94–97)
PCO2 BLDA: 56.8 MM HG (ref 36–44)
PCO2 BLDA: 60.9 MM HG (ref 36–44)
PCO2 BLDA: 71.7 MM HG (ref 36–44)
PH BLDA: 7.24 [PH] (ref 7.35–7.45)
PH BLDA: 7.32 [PH] (ref 7.35–7.45)
PH BLDA: 7.37 [PH] (ref 7.35–7.45)
PHOSPHATE SERPL-MCNC: 3.3 MG/DL (ref 2.7–4.5)
PLATELET # BLD AUTO: 104 THOUSANDS/UL (ref 149–390)
PMV BLD AUTO: 12 FL (ref 8.9–12.7)
PO2 BLDA: 60.3 MM HG (ref 75–129)
PO2 BLDA: 91.9 MM HG (ref 75–129)
PO2 BLDA: 97.2 MM HG (ref 75–129)
POTASSIUM SERPL-SCNC: 3.3 MMOL/L (ref 3.5–5.3)
POTASSIUM SERPL-SCNC: 3.5 MMOL/L (ref 3.5–5.3)
PR INTERVAL: 152 MS
QRS AXIS: 70 DEGREES
QRSD INTERVAL: 76 MS
QT INTERVAL: 396 MS
QTC INTERVAL: 433 MS
RBC # BLD AUTO: 2.42 MILLION/UL (ref 3.81–5.12)
RHODAMINE-AURAMINE STN SPEC: NORMAL
SODIUM SERPL-SCNC: 152 MMOL/L (ref 135–147)
SODIUM SERPL-SCNC: 153 MMOL/L (ref 135–147)
SPECIMEN SOURCE: ABNORMAL
T WAVE AXIS: 10 DEGREES
TRIGL SERPL-MCNC: 233 MG/DL
VENTRICULAR RATE: 72 BPM
VIT B12 SERPL-MCNC: 1015 PG/ML (ref 180–914)
WBC # BLD AUTO: 7.67 THOUSAND/UL (ref 4.31–10.16)

## 2024-02-27 PROCEDURE — 85014 HEMATOCRIT: CPT

## 2024-02-27 PROCEDURE — NC001 PR NO CHARGE: Performed by: INTERNAL MEDICINE

## 2024-02-27 PROCEDURE — 99233 SBSQ HOSP IP/OBS HIGH 50: CPT | Performed by: INTERNAL MEDICINE

## 2024-02-27 PROCEDURE — 85018 HEMOGLOBIN: CPT

## 2024-02-27 PROCEDURE — 94760 N-INVAS EAR/PLS OXIMETRY 1: CPT

## 2024-02-27 PROCEDURE — 85027 COMPLETE CBC AUTOMATED: CPT | Performed by: STUDENT IN AN ORGANIZED HEALTH CARE EDUCATION/TRAINING PROGRAM

## 2024-02-27 PROCEDURE — 82948 REAGENT STRIP/BLOOD GLUCOSE: CPT

## 2024-02-27 PROCEDURE — 85018 HEMOGLOBIN: CPT | Performed by: STUDENT IN AN ORGANIZED HEALTH CARE EDUCATION/TRAINING PROGRAM

## 2024-02-27 PROCEDURE — 80048 BASIC METABOLIC PNL TOTAL CA: CPT

## 2024-02-27 PROCEDURE — 99291 CRITICAL CARE FIRST HOUR: CPT | Performed by: INTERNAL MEDICINE

## 2024-02-27 PROCEDURE — 82607 VITAMIN B-12: CPT | Performed by: STUDENT IN AN ORGANIZED HEALTH CARE EDUCATION/TRAINING PROGRAM

## 2024-02-27 PROCEDURE — 82805 BLOOD GASES W/O2 SATURATION: CPT

## 2024-02-27 PROCEDURE — 84100 ASSAY OF PHOSPHORUS: CPT | Performed by: STUDENT IN AN ORGANIZED HEALTH CARE EDUCATION/TRAINING PROGRAM

## 2024-02-27 PROCEDURE — 94003 VENT MGMT INPAT SUBQ DAY: CPT

## 2024-02-27 PROCEDURE — 86140 C-REACTIVE PROTEIN: CPT

## 2024-02-27 PROCEDURE — 94640 AIRWAY INHALATION TREATMENT: CPT

## 2024-02-27 PROCEDURE — 94664 DEMO&/EVAL PT USE INHALER: CPT

## 2024-02-27 PROCEDURE — 82805 BLOOD GASES W/O2 SATURATION: CPT | Performed by: STUDENT IN AN ORGANIZED HEALTH CARE EDUCATION/TRAINING PROGRAM

## 2024-02-27 PROCEDURE — 93005 ELECTROCARDIOGRAM TRACING: CPT

## 2024-02-27 PROCEDURE — 82746 ASSAY OF FOLIC ACID SERUM: CPT | Performed by: STUDENT IN AN ORGANIZED HEALTH CARE EDUCATION/TRAINING PROGRAM

## 2024-02-27 PROCEDURE — 83735 ASSAY OF MAGNESIUM: CPT | Performed by: STUDENT IN AN ORGANIZED HEALTH CARE EDUCATION/TRAINING PROGRAM

## 2024-02-27 PROCEDURE — 84478 ASSAY OF TRIGLYCERIDES: CPT

## 2024-02-27 PROCEDURE — 93010 ELECTROCARDIOGRAM REPORT: CPT | Performed by: INTERNAL MEDICINE

## 2024-02-27 RX ORDER — POTASSIUM CHLORIDE 29.8 MG/ML
40 INJECTION INTRAVENOUS ONCE
Status: COMPLETED | OUTPATIENT
Start: 2024-02-27 | End: 2024-02-27

## 2024-02-27 RX ORDER — ACETAMINOPHEN 325 MG/1
650 TABLET ORAL EVERY 6 HOURS
Status: DISCONTINUED | OUTPATIENT
Start: 2024-02-27 | End: 2024-03-01

## 2024-02-27 RX ORDER — FAMOTIDINE 20 MG/1
20 TABLET, FILM COATED ORAL 2 TIMES DAILY
Status: DISCONTINUED | OUTPATIENT
Start: 2024-02-27 | End: 2024-03-03

## 2024-02-27 RX ADMIN — CHLORHEXIDINE GLUCONATE 0.12% ORAL RINSE 15 ML: 1.2 LIQUID ORAL at 22:08

## 2024-02-27 RX ADMIN — LEVOFLOXACIN 750 MG: 750 INJECTION, SOLUTION INTRAVENOUS at 11:12

## 2024-02-27 RX ADMIN — METHYLPREDNISOLONE SODIUM SUCCINATE 125 MG: 125 INJECTION, POWDER, FOR SOLUTION INTRAMUSCULAR; INTRAVENOUS at 00:46

## 2024-02-27 RX ADMIN — HYDROMORPHONE HYDROCHLORIDE 1.5 MG/HR: 10 INJECTION, SOLUTION INTRAMUSCULAR; INTRAVENOUS; SUBCUTANEOUS at 05:10

## 2024-02-27 RX ADMIN — GABAPENTIN 200 MG: 250 SOLUTION ORAL at 22:08

## 2024-02-27 RX ADMIN — VASOPRESSIN 0.04 UNITS/MIN: 20 INJECTION INTRAVENOUS at 23:30

## 2024-02-27 RX ADMIN — ACETAMINOPHEN 650 MG: 325 TABLET, FILM COATED ORAL at 22:08

## 2024-02-27 RX ADMIN — CHLORHEXIDINE GLUCONATE 0.12% ORAL RINSE 15 ML: 1.2 LIQUID ORAL at 09:15

## 2024-02-27 RX ADMIN — METHYLPREDNISOLONE SODIUM SUCCINATE 125 MG: 125 INJECTION, POWDER, FOR SOLUTION INTRAMUSCULAR; INTRAVENOUS at 17:28

## 2024-02-27 RX ADMIN — SODIUM CHLORIDE 1.5 UNITS/HR: 9 INJECTION, SOLUTION INTRAVENOUS at 08:03

## 2024-02-27 RX ADMIN — IPRATROPIUM BROMIDE 0.5 MG: 0.5 SOLUTION RESPIRATORY (INHALATION) at 08:02

## 2024-02-27 RX ADMIN — METOCLOPRAMIDE HYDROCHLORIDE 5 MG: 5 INJECTION INTRAMUSCULAR; INTRAVENOUS at 12:30

## 2024-02-27 RX ADMIN — TOPIRAMATE 100 MG: 100 TABLET, FILM COATED ORAL at 09:14

## 2024-02-27 RX ADMIN — SODIUM CHLORIDE 6 UNITS/HR: 9 INJECTION, SOLUTION INTRAVENOUS at 22:47

## 2024-02-27 RX ADMIN — FAMOTIDINE 20 MG: 20 TABLET, FILM COATED ORAL at 17:31

## 2024-02-27 RX ADMIN — ENOXAPARIN SODIUM 40 MG: 40 INJECTION SUBCUTANEOUS at 09:14

## 2024-02-27 RX ADMIN — ACETAMINOPHEN 650 MG: 325 TABLET, FILM COATED ORAL at 16:42

## 2024-02-27 RX ADMIN — OXYCODONE HYDROCHLORIDE 5 MG: 5 SOLUTION ORAL at 15:28

## 2024-02-27 RX ADMIN — LEVALBUTEROL HYDROCHLORIDE 1.25 MG: 1.25 SOLUTION RESPIRATORY (INHALATION) at 14:16

## 2024-02-27 RX ADMIN — METHYLPREDNISOLONE SODIUM SUCCINATE 125 MG: 125 INJECTION, POWDER, FOR SOLUTION INTRAMUSCULAR; INTRAVENOUS at 11:30

## 2024-02-27 RX ADMIN — METHYLPREDNISOLONE SODIUM SUCCINATE 125 MG: 125 INJECTION, POWDER, FOR SOLUTION INTRAMUSCULAR; INTRAVENOUS at 05:10

## 2024-02-27 RX ADMIN — NYSTATIN: 100000 POWDER TOPICAL at 09:16

## 2024-02-27 RX ADMIN — POTASSIUM CHLORIDE 40 MEQ: 29.8 INJECTION, SOLUTION INTRAVENOUS at 09:14

## 2024-02-27 RX ADMIN — IPRATROPIUM BROMIDE 0.5 MG: 0.5 SOLUTION RESPIRATORY (INHALATION) at 14:16

## 2024-02-27 RX ADMIN — LEVALBUTEROL HYDROCHLORIDE 1.25 MG: 1.25 SOLUTION RESPIRATORY (INHALATION) at 08:02

## 2024-02-27 RX ADMIN — IPRATROPIUM BROMIDE 0.5 MG: 0.5 SOLUTION RESPIRATORY (INHALATION) at 20:40

## 2024-02-27 RX ADMIN — LEVALBUTEROL HYDROCHLORIDE 1.25 MG: 1.25 SOLUTION RESPIRATORY (INHALATION) at 20:40

## 2024-02-27 RX ADMIN — VENLAFAXINE 25 MG: 25 TABLET ORAL at 16:44

## 2024-02-27 RX ADMIN — VENLAFAXINE 25 MG: 25 TABLET ORAL at 12:57

## 2024-02-27 RX ADMIN — TOPIRAMATE 100 MG: 100 TABLET, FILM COATED ORAL at 18:25

## 2024-02-27 RX ADMIN — VENLAFAXINE 25 MG: 25 TABLET ORAL at 09:14

## 2024-02-27 RX ADMIN — SENNOSIDES 8.6 MG: 8.6 TABLET, FILM COATED ORAL at 09:15

## 2024-02-27 RX ADMIN — OXYCODONE HYDROCHLORIDE 5 MG: 5 SOLUTION ORAL at 00:00

## 2024-02-27 RX ADMIN — GABAPENTIN 200 MG: 250 SOLUTION ORAL at 15:28

## 2024-02-27 RX ADMIN — GABAPENTIN 200 MG: 250 SOLUTION ORAL at 09:15

## 2024-02-27 RX ADMIN — FAMOTIDINE 20 MG: 40 POWDER, FOR SUSPENSION ORAL at 11:04

## 2024-02-27 RX ADMIN — OXYCODONE HYDROCHLORIDE 5 MG: 5 SOLUTION ORAL at 09:15

## 2024-02-27 RX ADMIN — DEXMEDETOMIDINE HYDROCHLORIDE 0.4 MCG/KG/HR: 4 INJECTION, SOLUTION INTRAVENOUS at 17:32

## 2024-02-27 RX ADMIN — SENNOSIDES 8.6 MG: 8.6 TABLET, FILM COATED ORAL at 17:17

## 2024-02-27 RX ADMIN — ACETAMINOPHEN 1000 MG: 10 INJECTION INTRAVENOUS at 05:56

## 2024-02-27 RX ADMIN — METOCLOPRAMIDE HYDROCHLORIDE 5 MG: 5 INJECTION INTRAMUSCULAR; INTRAVENOUS at 22:10

## 2024-02-27 RX ADMIN — POLYETHYLENE GLYCOL 3350 17 G: 17 POWDER, FOR SOLUTION ORAL at 09:15

## 2024-02-27 RX ADMIN — VASOPRESSIN 0.04 UNITS/MIN: 20 INJECTION INTRAVENOUS at 05:19

## 2024-02-27 RX ADMIN — OXYCODONE HYDROCHLORIDE 5 MG: 5 SOLUTION ORAL at 22:08

## 2024-02-27 RX ADMIN — LAMOTRIGINE 150 MG: 100 TABLET ORAL at 17:17

## 2024-02-27 RX ADMIN — DEXMEDETOMIDINE HYDROCHLORIDE 0.6 MCG/KG/HR: 4 INJECTION, SOLUTION INTRAVENOUS at 06:54

## 2024-02-27 RX ADMIN — METOCLOPRAMIDE HYDROCHLORIDE 5 MG: 5 INJECTION INTRAMUSCULAR; INTRAVENOUS at 04:39

## 2024-02-27 RX ADMIN — NYSTATIN: 100000 POWDER TOPICAL at 17:16

## 2024-02-27 RX ADMIN — VASOPRESSIN 0.04 UNITS/MIN: 20 INJECTION INTRAVENOUS at 13:06

## 2024-02-27 RX ADMIN — LAMOTRIGINE 150 MG: 100 TABLET ORAL at 09:14

## 2024-02-27 RX ADMIN — PROPOFOL 10 MCG/KG/MIN: 10 INJECTION, EMULSION INTRAVENOUS at 07:54

## 2024-02-27 RX ADMIN — MIDAZOLAM 2 MG: 1 INJECTION INTRAMUSCULAR; INTRAVENOUS at 21:24

## 2024-02-27 RX ADMIN — HYDROMORPHONE HYDROCHLORIDE 0.5 MG: 1 INJECTION, SOLUTION INTRAMUSCULAR; INTRAVENOUS; SUBCUTANEOUS at 21:21

## 2024-02-27 RX ADMIN — ACETAMINOPHEN 650 MG: 325 TABLET, FILM COATED ORAL at 11:31

## 2024-02-27 NOTE — PROGRESS NOTES
[facility]  Progress Note: Critical Care  Name: Carla Hunt 59 y.o. 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  Thrombocytopenia, suspected to be due to sepsis or possibly transfusion  Hypernatremia  B-cell lymphoma with history of treatment with Rituxan  Minimal SAH with no neurosurgical intervention indicated at the time  Seizure disorder s/p left temporal lobe partial resection  Steroid-induced hyperglycemia  Sacral ulcers bilaterally  Hypertriglyceridemia     Neuro:   Sedation: Precedex 0.6, propofol 10, dilaudid 1.5  -Aim to maintain RASS 0, currently between RASS 0 to -2  -Repeat triglyceride level at 233 from 02/27  -Continue to aim to wean sedation for possibility of SBT/extubation     Diagnosis: seizure disorder  -S/p partial left temporal lobe resection in 2007  -On Lamictal 150 bid and Topamax 100 bid     Diagnosis: Anxiety  -On venlafaxine 25 mg TID via NGT  -Was on zyprexa 5 mg qHS, now dc'd  -Gabapentin 200 mg TID     CV:   Diagnosis: Distributive shock, systolics in the 90s-110s  -Levophed drip off  -On vasopressin 0.04  -Maintain MAPs > 65 mmHg     Pulm:  Diagnosis: Acute hypoxic respiratory failure due to severe covid  and covid induced fibrosis  -Tested COVID positive on 1/7  -Completed severe COVID pathway and 7 days CTX  -Tenuous respiratory status requiring multiple re-admissions to MICU  -S/p Bronch 2/2 and 02/16  -NG on cultures (initially  showed non-group A strep)  -PCP and AFB negative   -Legionella, viral, fungal cultures no growth to date  -Pulse dose steroids with solumedrol 1g daily x3 days (completed 2/7) then transitioned to prednisone 80mg daily on 2/8  -Off veletri  -Completed IVIG for 5 days  -CT scan 2/24: general improvement of areas of consolidation and some areas of groundglass opacification on the lungs, still with significant groundglass opacification especially in the lower lobes. Bilateral consolidations in lower lobes. No evidence of PE.  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 Her base free water deficit would be 32.0L if trying to get to 145 sodium.  -On free water flushes 250 mL every 4 hours     Diagnosis: Acute kidney injury, sCr at 0.99, resolved     F/E/N:   F: Off IVF  E: monitor and replete for goal K>4, P>3, Mg>2  N: On tube feeds with glucerna     Heme/Onc:      Diagnosis: Thrombocytopenia, improving  -Will continue to monitor  -Could be in the setting of transfusions or sepsis. It may also be dilutional since she has been on IV fluids for past 24 hours  -Monitor sites for bleeding     Diagnosis: B cell  lymphoma  -Follows with heme[facility]onc at [facility]  -On rituxan for 2 year course, started May 2022  -Last dose was 12/20, treatment currently on hold   -Leukopenic on admission but WBC now wnl     DVT ppx with lovenox     Endo:   Diagnosis: steroid hyperglycemia and diabetes mellitus type 2, A1c at 6.6 from 01/2024  -Goal -180  -BG range last 24hrs 129-164  -On insulin drip     ID:   Diagnosis: Severe covid pneumonia and stenotrophomonas pneumonia  -Completed severe COVID pathway with decadron (ended 1/22) and remdesivir (ended 1/20), also completed 7 days CTX on 1/15  -C/f opportunistic infections given immunocompromised status on chemotherapy and recent steroid use  -No consolidations on CXR and procal negative  -S/p bactrim and minocycline x5 days for stenotrophomonas on sputum culture (1/25), then on bactrim for PJP ppx  -Bronc on 2/2 - Cx w/o growth (initially resulted as 1+ alpha hemolytic strep), AFB & PCP negative, f/u fungal, legionella, and viral cultures   -2/10 pt again had escalating O2 requirements on floors necessitating readmission to ICU  -Concern that with recent pulse dose steroids and now worsening respiratory status she could have infection, possibly opportunistic   -UA with moderate leukocytes, nitrite negative, 30-50 WBC, trace protein.  -Repeat sputum culture 2/9 with 4+ stenotrophomonas  -Bronch cultures with candida albicans, Rare gram positive cocci in pairs  -Previously completed 14 day course of antibiotics for stenotrophomonas pneumonia  -Completed 5 days of remdesevir  -Levaquin continued for an additional 7 days since bronchial cultures with pseudomonas MDR susceptible to levaquin. Day 2/7 today.      MSK/Skin:   -Wound care team following for bilateral sacral wounds    Disposition: Critical care    ICU Core Measures     Vented Patient  VAP Bundle  VAP bundle ordered     A: Assess, Prevent, and Manage Pain Has pain been assessed? Yes  Need for changes to pain regimen? No   B: Both  Spontaneous Awakening Trials (SATs) and Spontaneous Breathing Trials (SBTs) Plan to perform spontaneous awakening trial today? Modified and patient remained on precedex   Plan to perform spontaneous breathing trial today? Modified and patient remained on precedex   Obvious barriers to extubation? Yes   C: Choice of Sedation RASS Goal: -1 Drowsy  Need for changes to sedation or analgesia regimen? No   D: Delirium CAM-ICU: Negative   E: Early Mobility  Plan for early mobility? Yes   F: Family Engagement Plan for family engagement today? Yes       Antibiotic Review: Patient on appropriate coverage based on culture data.     Review of Invasive Devices:    Ortiz Plan: Continue for accurate I/O monitoring for 48 hours  Central access plan: Medications requiring central line Hemodynamic monitoring  Amelia Plan: Keep arterial line for frequent ABGs    Prophylaxis:  VTE VTE covered by:  enoxaparin, Subcutaneous, 40 mg at 02/26/24 0828       Stress Ulcer  covered byfamotidine (PEPCID) oral suspension 20 mg [[id number]]        Significant 24hr Events     24hr events: Overnight, patient remained on ventilator settings with P CMV respiratory rate of 20, pressure support of 14 and PEEP of 15 with FiO2 at 70% but earlier in the morning was weaned down to FiO2 of 60%.  In terms of pressors, she has been off of levo since earlier this morning.  Propofol at 10.      Subjective   Patient seen by bedside, will discuss medical updates with patient's family today    Review of Systems   Unable to perform ROS: Patient nonverbal        Objective                            Vitals I/O      Most Recent Min/Max in 24hrs   Temp 99.3 °F (37.4 °C) Temp  Min: 98.4 °F (36.9 °C)  Max: 102.9 °F (39.4 °C)   Pulse 65 Pulse  Min: 63  Max: 140   Resp 20 Resp  Min: 20  Max: 21   /56 BP  Min: 70/49  Max: 152/68   O2 Sat 98 % SpO2  Min: 96 %  Max: 100 %   On Precedex at 0.6, on propofol at 10, on Dilaudid drip at 1.5, and vasopressin drip at 0.04,  Levophed drip turned off, insulin drip running.    Vent settings: PC MV, 20, 14/15, FiO2 60%   Intake/Output Summary (Last 24 hours) at 2/27/2024 0658  Last data filed at 2/27/2024 0556  Gross per 24 hour   Intake 2877.8 ml   Output 2750 ml   Net 127.8 ml       Diet Enteral/Parenteral; Tube Feeding No Oral Diet; Glucerna 1.2; Continuous; 20; 250; Water; Every 4 hours    Invasive Monitoring   Arterial Line  Amelia /48  Arterial Line BP  Min: 83/54  Max: 155/58   MAP 66 mmHg  Arterial Line MAP (mmHg)  Min: 55 mmHg  Max: 89 mmHg           Physical Exam   Physical Exam  Vitals reviewed.   Skin:     General: Skin is warm.      Capillary Refill: Capillary refill takes less than 2 seconds.   HENT:      Head: Normocephalic.      Mouth/Throat:      Mouth: Mucous membranes are moist.   Cardiovascular:      Rate and Rhythm: Normal rate and regular rhythm.      Pulses: Normal pulses.   Musculoskeletal:      Comments: 1+ edema noted in both hands   Abdominal: General: Bowel sounds are normal.      Palpations: Abdomen is soft.   Constitutional:       Appearance: She is ill-appearing.      Interventions: She is sedated and intubated.      Comments: Patient has a central line, left hand arterial line, Ortiz, ETT, NG tube, right hand peripheral IV.  Ostomy pouch present.    Pulmonary:      Effort: Pulmonary effort is normal. She is intubated.      Comments: Mechanically ventilated breath sounds  Neurological:      Comments: Patient able to open eyes on verbal stimuli and physical stimuli, unable to follow commands.  Does not withdraw extremities to noxious stimuli.   Genitourinary/Anorectal:  Ortiz present.          Diagnostic Studies      EKG: Reviewed  Imaging: Reviewed I have personally reviewed pertinent reports.       Medications:  Scheduled PRN   acetaminophen, 1,000 mg, Q8H SARTHAK  chlorhexidine, 15 mL, Q12H SARTHAK  enoxaparin, 40 mg, Q24H SARTHAK  famotidine, 20 mg, Daily  gabapentin, 200 mg, TID  ipratropium, 0.5 mg,  TID  lamoTRIgine, 150 mg, BID  levalbuterol, 1.25 mg, TID  levofloxacin, 750 mg, Q24H  methylPREDNISolone sodium succinate, 125 mg, Q6H SARTHAK  metoclopramide, 5 mg, Q8H  nystatin, , BID  oxyCODONE, 5 mg, Q8H  polyethylene glycol, 17 g, Daily  potassium chloride, 40 mEq, Once  senna, 1 tablet, BID  topiramate, 100 mg, BID  venlafaxine, 25 mg, TID With Meals      hydrALAZINE, 10 mg, Q6H PRN  HYDROmorphone, 0.5 mg, Q1H PRN  midazolam, 2 mg, Q4H PRN  ondansetron, 4 mg, Q6H PRN       Continuous    dexmedetomidine, 0.1-1.2 mcg/kg/hr, Last Rate: 0.6 mcg/kg/hr (02/27/24 0654)  HYDROmorphone, 1.5 mg/hr, Last Rate: 1.5 mg/hr (02/27/24 0510)  insulin regular (HumuLIN R,NovoLIN R) 1 Units/mL in sodium chloride 0.9 % 100 mL infusion, 0.3-21 Units/hr, Last Rate: 1.5 Units/hr (02/27/24 0437)  norepinephrine, 1-30 mcg/min, Last Rate: Stopped (02/27/24 0607)  propofol, 5-50 mcg/kg/min, Last Rate: 10 mcg/kg/min (02/26/24 2221)  vasopressin, 0.04 Units/min, Last Rate: 0.04 Units/min (02/27/24 0519)         Labs:    CBC    Recent Labs     02/26/24  1446 02/27/24 0437   WBC 14.26* 7.67   HGB 10.0* 7.3*   HCT 32.1* 24.5*   * 104*   BANDSPCT 13*  --      BMP    Recent Labs     02/26/24  1446 02/27/24 0437   SODIUM 154* 153*   K 5.3 3.5   * 115*   CO2 29 33*   AGAP 11 5   BUN 33* 32*   CREATININE 0.64 0.61   CALCIUM 9.5 10.1       Coags    No recent results     Additional Electrolytes  Recent Labs     02/25/24  2300 02/25/24 2301 02/27/24  0437   MG  --   --  2.0   PHOS  --   --  3.3   CAIONIZED 1.39* 1.31  --           Blood Gas    Recent Labs     02/26/24 1202   PHART 7.320*   [id number] 56.1*   PO2ART 215.0*   YUQ0WIN 28.3*   BEART 1.3   SOURCE Line, Arterial     Recent Labs     02/26/24  0728 02/26/24  1202 02/27/24  0745   PHVEN 7.288*  --   --    [id number] 71.2*  --   --    PO2VEN 46.5*  --   --    [id number] 33.3*  --   --    BEVEN 4.8  --   --    [id number] 77.3  --   --    SOURCE  --    < > Line, Arterial    < > = values in  this interval not displayed.    LFTs  Recent Labs     02/25/24  2301 02/26/24  1446   ALT 17 16   AST 10* 11*   ALKPHOS 68 63   ALB 2.4* 2.5*   TBILI 0.70 0.78       Infectious  No recent results  Glucose  Recent Labs     02/25/24  2301 02/26/24  0130 02/26/24  1446 02/27/24  0437   GLUC 170* 135 219* 136               Miguel Whittaker MD

## 2024-02-27 NOTE — PROGRESS NOTES
Nutrition Follow-Up/Recommendations:    When medically able to advance Glucerna 1.2 TF, would recommend goal rate of 60mL/hr x24 hrs/day + add one packet prosource liquid protein daily.   This would provide 1788 kcals (1968 total calories with current propofol), 101g protein, 1219mL water, 164g CHO.     Adjustments in additional free water flushes per critical care and/or surgery team.

## 2024-02-27 NOTE — PROGRESS NOTES
Critical Care Attending Note     59 years old with B-Cell lymphoma - completed rituximab in Dec 2023, seizure disorder, anxiety, CKD III, presented 1/7 for encephalopathy. Found to have COVID-19 infection.  Treated Remdesivir/Decadron/actemra, extensive neurologic workup negative except very small SAH (LP, imaging, vEEG).  She has had protracted course with repeated episodes of worsened hypoxic respiratory failure to include multiple bronchoscopies with treatment for possible drug induced or COVID pneumonitis and Stenotrophomonas.  She has previously clinically improved on steroid courses.  Returned ICU 2/1 - pulse dosed steroids 2/5-2/8 with further taper, required intubation 2/13 also with severe epistaxis.  IVIG course completed 2/15. Repeated Remdesivir course 2/20-2/25.  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       24hr events - remained intubated, weaned FiO2 0.6, weaned off NE, remains on , weaned down propofol, remained on dilaudid/precedex    VS  yesterday evening, now AF, HR 60-70's, MAPS 60-80's, SpO2 97% 0.6/15P, I/Os +127cc  Exam  GEN middle aged woman, intubated, sedated, RASS -3 to -4, will open eyes transiently to verbal physical stim, not following commands for me  HEENT PERRL, no scleral icterus or nystagmus, MMM  NECK no accessory muscle use, LIJ TLC w/o purulence  CV reg, single s1/2, no m/r  Pulm mechanical BS, no wheeze or rales, no rhonchi, no sig secretions, pltP 22  ABD +BS, soft, ND, nonrigid, midline stables in place w/o purulence colostomy in place with brown soft stool  EXT warm, 1+ diffuse edema, brisk cap refill   marie in place yellow urine    Laboratory and Diagnostics  Results from last 7 days   Lab Units 02/27/24  0946 02/27/24  0437 02/26/24  1446 02/25/24  2300 02/25/24  0419 02/24/24  0413 02/23/24  0602 02/22/24  0436 02/21/24  1533 02/21/24  0553 02/20/24  1759   WBC Thousand/uL  --  7.67 14.26*  --  8.19 10.30* 10.61*  23.58*  --  13.78* 11.89*   HEMOGLOBIN g/dL 7.1* 7.3* 10.0*  --  10.0* 10.0* 9.3* 11.4*   < > 7.2* 7.5*   I STAT HEMOGLOBIN g/dl  --   --   --  9.9*  --   --   --   --   --   --   --    HEMATOCRIT %  --  24.5* 32.1*  --  31.3* 29.2* 29.1* 36.0   < > 22.8* 22.7*   HEMATOCRIT, ISTAT %  --   --   --  29*  --   --   --   --   --   --   --    PLATELETS Thousands/uL  --  104* 139*  --  103* 98*  --  237  --  196 224   BANDS PCT %  --   --  13*  --  8  --   --   --   --  28* 55*   MONO PCT %  --   --  1*  --  2*  --   --   --   --  0* 0*   EOS PCT %  --   --  0  --  3  --   --   --   --  0 0    < > = values in this interval not displayed.     Results from last 7 days   Lab Units 02/27/24  0437 02/26/24  1446 02/26/24  0130 02/25/24  2301 02/25/24  2300 02/25/24  1942 02/25/24  1646 02/25/24  1224   SODIUM mmol/L 153* 154* 153* 155*  --  151* 151* 147   POTASSIUM mmol/L 3.5 5.3 3.3* 3.9  --  3.5 3.6 3.8   CHLORIDE mmol/L 115* 114* 111* 108  --  112* 110* 109*   CO2 mmol/L 33* 29 33* 39*  --  32 32 32   CO2, I-STAT mmol/L  --   --   --   --  38*  --   --   --    ANION GAP mmol/L 5 11 9 8  --  7 9 6   BUN mg/dL 32* 33* 30* 32*  --  32* 31* 34*   CREATININE mg/dL 0.61 0.64 0.50* 0.62  --  0.51* 0.48* 0.53*   CALCIUM mg/dL 10.1 9.5 9.3 9.9  --  9.8 10.0 10.2   GLUCOSE RANDOM mg/dL 136 219* 135 170*  --  128 95 155*   ALT U/L  --  16  --  17  --   --   --   --    AST U/L  --  11*  --  10*  --   --   --   --    ALK PHOS U/L  --  63  --  68  --   --   --   --    ALBUMIN g/dL  --  2.5*  --  2.4*  --   --   --   --    TOTAL BILIRUBIN mg/dL  --  0.78  --  0.70  --   --   --   --      Results from last 7 days   Lab Units 02/27/24  0437 02/25/24  0419 02/24/24  0413 02/23/24  0602 02/22/24  0436 02/21/24  0553 02/20/24  1759   MAGNESIUM mg/dL 2.0 1.7* 1.6* 1.9 2.0 2.1 2.5   PHOSPHORUS mg/dL 3.3  --   --   --   --   --  4.0               Results from last 7 days   Lab Units 02/26/24  0130 02/25/24  2301 02/24/24  1245 02/22/24  0022  02/21/24  2112 02/20/24  1759   LACTIC ACID mmol/L 2.0 3.1* 1.4 1.5 2.5* 2.7*     Results from last 7 days   Lab Units 02/27/24  0143 02/26/24  1446 02/21/24  0553   CRP mg/L 291.3* 338.1* 335.8*             Results from last 7 days   Lab Units 02/26/24  1446 02/25/24  2301   D-DIMER QUANTITATIVE ug/ml FEU 1.97* 1.58*           ABG:   Results from last 7 days   Lab Units 02/27/24  0745   PH ART  7.366   PCO2 ART mm Hg 60.9*   PO2 ART mm Hg 91.9   HCO3 ART mmol/L 34.1*   BASE EXC ART mmol/L 7.8   ABG SOURCE  Line, Arterial       -->233  lipase normal     MICRO  Bld CX 2/26 - NGTD  COVID PCR 2/21 - pending  Bronch BAL 2/21 4+ candida, few colonies MDR Pseudomonas  MRSA neg 2/18  BAL COVID (+) 2/16  PCP DFA neg 2/16  Sputum 2/9 - Stenotrophomonas      RADIOGRAPHS - images personally reviewed  Upper Ext US 2/26 - neg for DVT, (+) superficial thrombophlebitis b/l    CT angio 2/26 - no PE, persistent bilateral ground glass infiltrates with some mild improvement from priori scans, bibasilar consolidations, no sig effusions, small ascites, no new intra-abdominal pathology     TTE 2/2024 - EF 70%, grade I diastolic dysfunction     Assessment  Acute hypoxic respiratory failure - D#14 intubation  Abnormal CT chest - concerning for likely persistent COVID pneumonitis in setting of rituximab dosing vs drug induced pneumonitis, less likely autoimmune pneumonitis given negative serology testing  Stenotrophomonas/Pseudomonas pneumonia - completed prior course, likely airway colonization, now on levofloxacin given clinical decline  Shock - mixed septic, hypovolemic  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  Toxic/metabolic encephalopathy  Thrombocytopenia, anemia   B-Cell lymphoma  Minimal SAH POA  Seizure disorder  Steroid induced hyperglycemia  Elevated TG  Hypernatremia  Hypokalemia  CKD, resolved ELIESER  Upper ext thrombophlebitis      PLAN  NEURO - continue topamax and lamictal, wean down propofol off,  continue precedex/dilaudid gtts - attempt further dilaudid wean, continue scheduled oxycodone, goal RASS -2 to 0  CARDIAC -  try to wean off , goal MAP >65,   PULM - continue mechanical vent support AC/PC InsP 14 Vt 400-420, reduce rate 20, FiO2 0.5 and PEEP 14, ABG in 1 hour, cont steroids, solumedrol 125mg q6hrs for today, based upon clinical course, will revisit tracheostomy in next few days  GI - take TFs to goal,  monitor ostomy output  RENAL - inc free water 350cc q4hrs, trend BMP q12hrs, replete electrolytes, hold diuresis for now  ID - completed second course remdesivir, continue levofloxacin per ID, follow up cultures, trend DDimer, CRP q48hrs  ENDO - continue insulin gtt  HEME - B cell lymphoma, no active treatments  ICU Care - continue SCDs/LMWH, CHG, HOB >3deg, continue pepcid  I updated patient's  extensively at bedside, all questions answered    My personal critical care time excluding procedures, teaching and updates is 46 minutes    Juvencio Kmep, DO

## 2024-02-27 NOTE — QUICK NOTE
2/27/2024 12:35 PM -  Carla Hunt's chart and case were reviewed by Hannah Ramirez PA-C.  Mode of review included electronic chart check, and communication with primary team.      Per review, primary team met with Carla's family (including her  and daughter) yesterday. Discussed plan to continue optimization, slow steroid taper, prior to making a decision on trach/PEG vs extubation. Today patient is showing relative stability, primary will attempt wean peep/FiO2 if able. Per primary, would be appropriate to revisit plan of care on Thursday after another 48H of optimization.      Palliative MSW visited with patient's spouse today for ongoing support.        For urgent issues or any questions/concerns, please notify on-call provider via [employer].  You may also call our answering service 24/7 at [phone].    Hannah Ramirez PA-C  Palliative and Supportive Care  Clinic/Answering Service: [phone]  You can find me on [employer]!

## 2024-02-27 NOTE — SOCIAL WORK
"KRISTIN LOWE met with the pt  at bedside.  He stated he was doing, \"ok.\"  It is his understanding the pt steroids will be increased.  He is aware the pt was not successful in removing the vent, but he states she does better when her steroids are not removed as quickly.  He stated that ID also believes the removal of the steroids could be a factor in her treatment to remove the vent.    The pt  remains very hopeful that the pt will recover.  The MICU staff remain believes there has been some improvement.    The pt  stated he has no needs at this time.  KRISTIN LOWE informed him we are available to support him in any way possible.  He was thankful for the visit.    The pt remains sedated.    I have spent 15 minutes with Family today in which greater than 50% of this time was spent in counseling/coordination of care     Palliative  will follow-up as requested by patient, family, and primary team.  Please contact with any specific requests    "

## 2024-02-27 NOTE — PROGRESS NOTES
Progress Note - Infectious Disease   Carla Hunt 59 y.o. female MRN: [id number]  [facility] En[facility]: [id number]      Impression/Recommendations:  Possible bacterial pneumonia.  Initially, on admission early January, secondary to mostly COVID but there was concern for concurrent bacterial pneumonia on admission due to elevated procalcitonin. Patient completed treatment course for both COVID and bacterial pneumonia.  She was clinically improved with decreasing O2 requirement.  However, patient deteriorated in late January, requiring to be placed back on high flow O2 support.  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  Procalcitonin x 3 were in the normal range.  Patient has no fever or leukocytosis.  She improved with increasing steroid dose.  She completed a 5-day course of Bactrim/minocycline for presumptive stenotrophomonas respiratory infection, with expectorated sputum growing stenotrophomonas.  She subsequently had bronchoscopy, with BAL culture negative for bacterial growth, AFB and PCP but completed the course of Bactrim/minocycline prior to bronchoscopy.  Patient improved and O2 was being weaned again until 2 weeks ago, when she deteriorated again.  She is now back in ICU.  Expectorated sputum once again is growing stenotrophomonas.  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  Not sure that the stenotrophomonas that is growing again and expectorated sputum is pathogenic versus upper airway colonization.  Patient was restarted on high-dose steroid and Bactrim.  Patient's respiratory status remained tenuous throughout, required intubation subsequently.  She had bronchoscopy on 2/16 with moderate secretion.  BAL culture had no growth but COVID PCR was positive.  Bactrim was changed to Levaquin over weekend due to ELIESER.  Remdesivir was restarted for possible COVID relapse.  Due to worsening consolidations on CT,  patient is status post repeat bronchoscopy on 2/21, with some purulent secretion.  BAL culture had light growth of Pseudomonas and stenotrophomonas.  Given that patient's Pseudomonas isolate is susceptible to Levaquin and stenotrophomonas isolate is susceptible to both Bactrim and Levaquin and with patient having been on Levaquin for the last 7 days (and Bactrim for the prior 10 days), both Pseudomonas and stenotrophomonas isolates are most likely airway colonization.  Growth of Candida is most likely airway colonization also.  Given deterioration overnight, will continue Levaquin for now.  With patient's fever resolving and improving respiratory status with increasing systemic corticosteroid, doubt any further active infection.  Repeat blood cultures have no growth.  Continue Levaquin.    Monitor temperature/WBC.  Monitor respiratory status.  If patient continues to improve on high-dose steroid, discontinue antibiotic in the next 24 hours.  Follow-up repeat blood cultures.     Severe COVID, present on admission.  Patient was treated with a 10-day course of remdesivir, dexamethasone and was given 1 dose of Tocilizumab on admission.  She also had a course of antibiotic initially for presumptive bacterial superinfection.  COVID antigen was negative prior to coming off isolation.  Due to positive COVID PCR in BAL, patient completed another 5-day course of remdesivir.  Patient has remained on systemic corticosteroid throughout her hospitalization.  No further antiviral necessary.     Acute hypoxic respiratory failure, likely multifactorial, with both COVID and bacterial ammonia contributing, with patient back on ventilator.  Patient was slowly improving but deteriorated over the last 24 hours.  She is requiring increasing ventilator support and pressor support.  The last 2 times that she deteriorated was when steroid was being weaned.  With her current steroid being weaned over the last week, I suspect her deterioration  is once again secondary to steroid weaning.  Patient's steroid dose was increased yesterday.  Her respiratory status is improving again, with her weaning again.  In addition, temperature is now down.  Ventilator support and weaning per critical care medicine service.  Continue and maintain high-dose steroid for a more extended period of time.     CKD.  Creatinine worsened on Bactrim.  Patient is now off Bactrim.  Creatinine has improved.  Monitor creatinine.     5. 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  Patient is status post exploratory laparotomy with ileocecectomy and end ileostomy creation.  Surgery follow-up.     B-cell lymphoma, on chemotherapy, which is currently postponed.  Patient was leukopenic on admission but resolved.  Monitor WBC/ANC.     Discussed with patient's  and family in detail regarding the above plan  Discussed with Dr. Whittaker from critical care medicine service regarding patient's improvement with increasing steroid dose and plan for antibiotic discontinuation in the next 24 hours if patient continues to improve.  He is in agreement.     Antibiotics:  Levaquin # 9  Antibiotic # 18  Off remdesivir     Subjective:  Patient improved with increasing systemic corticosteroid.  Temperature is down.  Renal support decreased.  She is now off pressors.  She remains intubated but now responsive  She is tolerating antibiotic well.  No nausea, vomiting or diarrhea.    Objective:  Vitals:  Temp:  [98.4 °F (36.9 °C)-102.2 °F (39 °C)] 98.6 °F (37 °C)  HR:  [] 110  Resp:  [20-26] 24  BP: ()/(53-81) 98/53  SpO2:  [92 %-100 %] 96 %  Temp (24hrs), Av.2 °F (37.9 °C), Min:98.4 °F (36.9 °C), Max:102.2 °F (39 °C)  Current: Temperature: 98.6 °F (37 °C)    Physical Exam:     General: Sedated on ventilator but opens eyes to verbal stimuli.  Comfortable.  Nontoxic.   Neck:  Supple. No mass.  No lymphadenopathy.   Lungs: Expansion symmetric, mild diffuse rhonchi, no rales, no  wheezing.   Heart:  Tachycardic with regular rhythm, S1 and S2 normal, no murmur.   Abdomen: Soft, nondistended, non-tender, bowel sounds active all four quadrants, no masses, no organomegaly.   Extremities: Stable mild leg edema. No erythema/warmth. No ulcer. Nontender to palpation.   Skin:  No rash.   Neuro: Not assessable.     Invasive Devices       Central Venous Catheter Line  Duration             CVC Central Lines 02/20/24 7 days              Peripheral Intravenous Line  Duration             Peripheral IV 02/23/24 Right Antecubital 4 days              Arterial Line  Duration             Arterial Line 02/14/24 Radial 13 days              Drain  Duration             Urethral Catheter Latex 16 Fr. 7 days    Ileostomy RUQ 6 days    NG/OG/Enteral Tube Nasogastric 18 Fr Left nare 6 days              Airway  Duration             ETT  Cuffed 7.5 mm 13 days                    Labs studies:   I have personally reviewed pertinent labs.  Results from last 7 days   Lab Units 02/27/24  0437 02/26/24  1446 02/26/24  0130 02/25/24  2301 02/25/24  2300 02/20/24  1759 02/20/24  1523 02/20/24  1515   POTASSIUM mmol/L 3.5 5.3 3.3* 3.9  --    < >  --   --    CHLORIDE mmol/L 115* 114* 111* 108  --    < >  --   --    CO2 mmol/L 33* 29 33* 39*  --    < >  --   --    CO2, I-STAT mmol/L  --   --   --   --  38*  --  23 24   BUN mg/dL 32* 33* 30* 32*  --    < >  --   --    CREATININE mg/dL 0.61 0.64 0.50* 0.62  --    < >  --   --    EGFR ml/min/1.73sq m 100 98 107 99  --    < >  --   --    GLUCOSE, ISTAT mg/dl  --   --   --   --  176*  --  195* 209*   CALCIUM mg/dL 10.1 9.5 9.3 9.9  --    < >  --   --    AST U/L  --  11*  --  10*  --   --   --   --    ALT U/L  --  16  --  17  --   --   --   --    ALK PHOS U/L  --  63  --  68  --   --   --   --     < > = values in this interval not displayed.     Results from last 7 days   Lab Units 02/27/24  1312 02/27/24  0946 02/27/24  0437 02/26/24  1446 02/25/24  2300 02/25/24  0419   WBC Thousand/uL   --   --  7.67 14.26*  --  8.19   HEMOGLOBIN g/dL 7.7* 7.1* 7.3* 10.0*  --  10.0*   I STAT HEMOGLOBIN   --   --   --   --    < >  --    PLATELETS Thousands/uL  --   --  104* 139*  --  103*    < > = values in this interval not displayed.     Results from last 7 days   Lab Units 02/26/24  1251 02/26/24  1144 02/21/24  1713 02/21/24  1710 02/21/24  1704   BLOOD CULTURE  No Growth at 24 hrs. No Growth at 24 hrs.  --   --   --    GRAM STAIN RESULT   --   --  2+ Polys*  1+ Yeast* Rare Polys  No bacteria seen Rare Polys  No bacteria seen       Imaging Studies:   I have personally reviewed pertinent imaging study reports and images in PACS.    EKG, Pathology, and Other Studies:   I have personally reviewed pertinent reports.

## 2024-02-27 NOTE — PROGRESS NOTES
Spiritual Care Progress Note    2024  Patient: Carla Hunt : 1965  Admission Date & Time: 1/10/2024 1941  MRN: [id number] CSN: [id number]      Met with pt and pt's . Pt's  is in better spirits and noting of pt's slight improvement. Cultivated relationship of care and offered active listening, validation and gentle encouragement. Offered education on ways Spiritual Care can offer support and how to alert staff should any desire for a  arise. No further needs expressed at this time.    Chaplains still remain available.       24 1500   Clinical Encounter Type   Visited With Patient and family together

## 2024-02-27 NOTE — RESPIRATORY THERAPY NOTE
RT Ventilator Management Note  Carla Hunt 59 y.o. female MRN: [id number]  Unit/Bed#: [facility] 10 Encounter: [id number]      Daily Screen         2/26/2024  0746 2/27/2024  0803          Patient safety screen outcome:: Failed Failed (P)       Not Ready for Weaning due to:: PEEP > 8cmH2O;FiO2 >60%;Underline problem not resolved FiO2 >60%;PEEP > 8cmH2O;Underline problem not resolved;Alternative forms of ventilation (P)                 Physical Exam:   Assessment Type: (P) Pre-treatment  General Appearance: (P) Sedated  Respiratory Pattern: (P) Assisted  Chest Assessment: (P) Chest expansion symmetrical  Bilateral Breath Sounds: (P) Diminished  Suction: (P) ET Tube      Resp Comments: (P) Received pt on PC with settings per flow sheet. ABG pending. Plan is to slowly wean peep and fio2. Will cont as ordered.

## 2024-02-27 NOTE — UTILIZATION REVIEW
"Continued Stay Review    Date: 02/27                          Current Patient Class: IP  Current Level of Care: Level 2 stepdown /HOT    HPI:59 y.o. female initially admitted on 01/10     Assessment/Plan: Pt remained  intubated, weaned FiO2 0.6, weaned off NE, remains on , weaned down propofol, remained on dilaudid/precedex. Bld CX 2/26 - NGTD. continue mechanical vent support AC/PC InsP 14 Vt 400-420, reduce rate 20, FiO2 0.5 and PEEP 14, ABG in 1 hour. cont solumedrol 125mg q6hrs for today.wean down propofol off, continue precedex/dilaudid gtts - attempt further dilaudid wean, continue scheduled oxycodone, goal RASS -2 to 0.  try to wean off , goal MAP >65. TFs to goal,  monitor ostomy output.   inc free water 350cc q4hrs, trend BMP q12hrs, replete electrolytes, hold diuresis for now. continue levofloxacin, follow up cultures, trend DDimer, CRP q48hrs. continue insulin gtt.    Vital Signs: BP 98/53   Pulse (!) 110   Temp 98.6 °F (37 °C)   Resp (!) 24   Ht 5' 8\" (1.727 m)   Wt 76 kg (167 lb 8.8 oz)   SpO2 99%   BMI 25.48 kg/m²       Pertinent Labs/Diagnostic Results:   02/27 EKG result:   Normal sinus rhythm  Baseline artifact  Nonspecific ST abnormality  Results from last 7 days   Lab Units 02/27/24  1312 02/27/24  0946 02/27/24  0437 02/26/24  1446 02/25/24  2300 02/25/24  0419 02/21/24  1533 02/21/24  0553   WBC Thousand/uL  --   --  7.67 14.26*  --  8.19   < > 13.78*   HEMOGLOBIN g/dL 7.7* 7.1* 7.3* 10.0*  --  10.0*   < > 7.2*   I STAT HEMOGLOBIN g/dl  --   --   --   --  9.9*  --   --   --    HEMATOCRIT % 25.6*  --  24.5* 32.1*  --  31.3*   < > 22.8*   HEMATOCRIT, ISTAT %  --   --   --   --  29*  --   --   --    PLATELETS Thousands/uL  --   --  104* 139*  --  103*   < > 196   BANDS PCT %  --   --   --  13*  --  8  --  28*    < > = values in this interval not displayed.         Results from last 7 days   Lab Units 02/27/24  0437 02/26/24  1446 02/26/24  0130 02/25/24  2301 02/25/24  2300 " 02/25/24  1942 02/25/24  0844 02/25/24  0419 02/24/24  0817 02/24/24  0413 02/23/24  1151 02/23/24  0602 02/22/24  0831 02/22/24  0436 02/21/24  0403 02/20/24  1759   SODIUM mmol/L 153* 154* 153* 155*  --  151*   < > 149*   < > 146   < > 150*   < > 146   < > 151*   POTASSIUM mmol/L 3.5 5.3 3.3* 3.9  --  3.5   < > 3.3*   < > 3.9   < > 4.3   < > 5.0   < > 4.9   CHLORIDE mmol/L 115* 114* 111* 108  --  112*   < > 109*   < > 110*   < > 115*   < > 115*   < > 115*   CO2 mmol/L 33* 29 33* 39*  --  32   < > 34*   < > 30   < > 28   < > 28   < > 27   CO2, I-STAT mmol/L  --   --   --   --  38*  --   --   --   --   --   --   --   --   --   --   --    ANION GAP mmol/L 5 11 9 8  --  7   < > 6   < > 6   < > 7   < > 3   < > 9   BUN mg/dL 32* 33* 30* 32*  --  32*   < > 37*   < > 43*   < > 47*   < > 48*   < > 77*   CREATININE mg/dL 0.61 0.64 0.50* 0.62  --  0.51*   < > 0.59*   < > 0.70   < > 0.77   < > 0.99   < > 1.56*   EGFR ml/min/1.73sq m 100 98 107 99  --  106   < > 101   < > 95   < > 85   < > 63   < > 36   CALCIUM mg/dL 10.1 9.5 9.3 9.9  --  9.8   < > 10.1   < > 10.5*   < > 10.0   < > 10.3*   < > 10.4*   CALCIUM, IONIZED mmol/L  --   --   --  1.31  --   --   --   --   --   --   --   --   --   --   --  1.37*   CALCIUM, IONIZED, ISTAT mmol/L  --   --   --   --  1.39*  --   --   --   --   --   --   --   --   --   --   --    MAGNESIUM mg/dL 2.0  --   --   --   --   --   --  1.7*  --  1.6*  --  1.9  --  2.0   < > 2.5   PHOSPHORUS mg/dL 3.3  --   --   --   --   --   --   --   --   --   --   --   --   --   --  4.0    < > = values in this interval not displayed.     Results from last 7 days   Lab Units 02/26/24  1446 02/25/24  2301 02/20/24  1759   AST U/L 11* 10*  --    ALT U/L 16 17  --    ALK PHOS U/L 63 68  --    TOTAL PROTEIN g/dL 4.9* 4.8* 5.1*   ALBUMIN g/dL 2.5* 2.4*  --    TOTAL BILIRUBIN mg/dL 0.78 0.70  --    BILIRUBIN DIRECT mg/dL 0.60*  --   --      Results from last 7 days   Lab Units 02/27/24  0742 02/27/24  0602  "02/27/24  0436 02/27/24  0224 02/27/24  0052 02/26/24  2155 02/26/24  2023 02/26/24  1745 02/26/24  1603 02/26/24  1443 02/26/24  1148 02/26/24  1007   POC GLUCOSE mg/dl 133 135 120 147* 176* 164* 129 198* 207* 203* 156* 164*     Results from last 7 days   Lab Units 02/27/24  0437 02/26/24  1446 02/26/24  0130 02/25/24  2301 02/25/24  1942 02/25/24  1646 02/25/24  1224 02/25/24  0844 02/25/24  0419 02/25/24  0017 02/24/24  1942 02/24/24  1641   GLUCOSE RANDOM mg/dL 136 219* 135 170* 128 95 155* 108 106 101 212* 283*     Results from last 7 days   Lab Units 02/24/24  0413 02/22/24  2018 02/22/24  0436 02/21/24  2112 02/21/24  2035 02/21/24  1533 02/21/24  1301 02/21/24  0807 02/21/24  0403 02/21/24  0021   OSMOLALITY, SERUM mmol/* 332* 343* 334* 336* 334* 340* 345* 348* 346*         No results found for: \"BETA-HYDROXYBUTYRATE\"   Results from last 7 days   Lab Units 02/27/24  1323 02/27/24  1121 02/27/24  0745   PH ART  7.318* 7.244* 7.366   PCO2 ART mm Hg 56.8* 71.7* 60.9*   PO2 ART mm Hg 60.3* 97.2 91.9   HCO3 ART mmol/L 28.5* 30.3* 34.1*   BASE EXC ART mmol/L 1.9 2.2 7.8   O2 CONTENT ART mL/dL 10.1* 11.2* 10.2*   O2 HGB, ARTERIAL % 88.4* 94.5 95.1   ABG SOURCE  Line, Arterial Line, Arterial Line, Arterial     Results from last 7 days   Lab Units 02/26/24  0728   PH NICA  7.288*   PCO2 NICA mm Hg 71.2*   PO2 NICA mm Hg 46.5*   HCO3 NICA mmol/L 33.3*   BASE EXC NICA mmol/L 4.8   O2 CONTENT NICA ml/dL 12.9   O2 HGB, VENOUS % 77.3     Results from last 7 days   Lab Units 02/25/24 2300   I STAT BASE EXC mmol/L 9*   I STAT O2 SAT % 79   ISTAT PH ART  7.331*   I STAT ART PCO2 mm HG 68.7*   I STAT ART PO2 mm HG 48.0*   I STAT ART HCO3 mmol/L 36.3*             Results from last 7 days   Lab Units 02/26/24  1446 02/25/24 2301   D-DIMER QUANTITATIVE ug/ml FEU 1.97* 1.58*                 Results from last 7 days   Lab Units 02/26/24  0130 02/25/24  2301 02/24/24  1245 02/22/24  0022 02/21/24  2112 02/20/24  1759   LACTIC " ACID mmol/L 2.0 3.1* 1.4 1.5 2.5* 2.7*                         Results from last 7 days   Lab Units 02/22/24  0555 02/21/24  1622   UNIT PRODUCT CODE  [id number]  [id number]  [id number]   UNIT NUMBER  [id number]  [id number]  [id number]   UNITABO  A  A A  A   UNITRH  NEG  NEG NEG  NEG   CROSSMATCH  Compatible  Compatible Compatible  Compatible   UNIT DISPENSE STATUS  Presumed Trans  Presumed Trans Presumed Trans  Return to Inv   UNIT PRODUCT VOL mL 350  350 350  350         Results from last 7 days   Lab Units 02/26/24  0130   LIPASE u/L 71     Results from last 7 days   Lab Units 02/27/24  0143 02/26/24  1446 02/21/24  0553   CRP mg/L 291.3* 338.1* 335.8*         Results from last 7 days   Lab Units 02/24/24  0413 02/22/24  2018 02/22/24  0436 02/21/24  2112 02/21/24  2035 02/21/24  1533 02/21/24  1301   OSMOLALITY, SERUM mmol/* 332* 343* 334* 336* 334* 340*                                     Results from last 7 days   Lab Units 02/26/24  1251 02/26/24  1144 02/21/24  1713 02/21/24  1710 02/21/24  1704   BLOOD CULTURE  No Growth at 24 hrs. No Growth at 24 hrs.  --   --   --    GRAM STAIN RESULT   --   --  2+ Polys*  1+ Yeast* Rare Polys  No bacteria seen Rare Polys  No bacteria seen     Results from last 7 days   Lab Units 02/21/24  1710 02/21/24  1704   TOTAL COUNTED  100 100               Medications:   Scheduled Medications:  acetaminophen, 650 mg, Oral, Q6H  chlorhexidine, 15 mL, Mouth/Throat, Q12H SARTHAK  enoxaparin, 40 mg, Subcutaneous, Q24H SARTHAK  famotidine, 20 mg, Oral, BID  gabapentin, 200 mg, Oral, TID  ipratropium, 0.5 mg, Nebulization, TID  lamoTRIgine, 150 mg, Oral, BID  levalbuterol, 1.25 mg, Nebulization, TID  levofloxacin, 750 mg, Intravenous, Q24H  methylPREDNISolone sodium succinate, 125 mg, Intravenous, Q6H SARTHAK  metoclopramide, 5 mg, Intravenous, Q8H  nystatin, , Topical, BID  oxyCODONE, 5 mg, Per NG Tube, Q8H  polyethylene glycol, 17 g, Per NG  Tube, Daily  senna, 1 tablet, Per NG Tube, BID  topiramate, 100 mg, Per PEG Tube, BID  venlafaxine, 25 mg, Per NG Tube, TID With Meals      Continuous IV Infusions:  dexmedetomidine, 0.1-1.2 mcg/kg/hr, Intravenous, Titrated  HYDROmorphone, 1 mg/hr, Intravenous, Continuous  insulin regular (HumuLIN R,NovoLIN R) 1 Units/mL in sodium chloride 0.9 % 100 mL infusion, 0.3-21 Units/hr, Intravenous, Titrated  norepinephrine, 1-30 mcg/min, Intravenous, Titrated  propofol, 5-50 mcg/kg/min, Intravenous, Titrated  vasopressin, 0.04 Units/min, Intravenous, Continuous      PRN Meds:  HYDROmorphone, 0.5 mg, Intravenous, Q1H PRN  midazolam, 2 mg, Intravenous, Q4H PRN  ondansetron, 4 mg, Intravenous, Q6H PRN        Discharge Plan: D    Network Utilization Review Department  ATTENTION: Please call with any questions or concerns to [phone] and carefully listen to the prompts so that you are directed to the right person. All voicemails are confidential.   For Discharge needs, contact Care Management DC Support Team at [phone] opt. [phone]  Send all requests for admission clinical reviews, approved or denied determinations and any other requests to dedicated fax number below belonging to the campus where the patient is receiving treatment. List of dedicated fax numbers for the Facilities:  FACILITY NAME UR FAX NUMBER   ADMISSION DENIALS (Administrative/Medical Necessity) [phone]   DISCHARGE SUPPORT TEAM (NETWORK) [phone]   PARENT CHILD HEALTH (Maternity/NICU/Pediatrics) [phone]   [facility] [phone]   [facility] [phone]   [facility] [phone]   [facility] [phone]   [facility] [phone]   [facility] [phone]   [facility] [phone]   [facility] [phone]   [facility]  [facility] [phone]   [facility] [phone]   [facility] [phone]   [facility] [phone]

## 2024-02-27 NOTE — RESPIRATORY THERAPY NOTE
RT Ventilator Management Note  Carla Hunt 59 y.o. female MRN: [id number]  Unit/Bed#: [facility] 10 Encounter: [id number]      Daily Screen         2/25/2024  0817 2/26/2024  0746          Patient safety screen outcome:: Failed Failed      Not Ready for Weaning due to:: Underline problem not resolved PEEP > 8cmH2O;FiO2 >60%;Underline problem not resolved                Physical Exam:   Assessment Type: (P) Assess only  General Appearance: (P) Sedated  Respiratory Pattern: (P) Assisted  Chest Assessment: (P) Chest expansion symmetrical  Bilateral Breath Sounds: (P) Diminished, Coarse  O2 Device: vent      Resp Comments: (P) Pt stable on current PC settings overnight. Fio2 titrated to 60% no other changes made

## 2024-02-28 ENCOUNTER — APPOINTMENT (INPATIENT)
Dept: RADIOLOGY | Facility: HOSPITAL | Age: 59
DRG: 003 | End: 2024-02-28
Payer: COMMERCIAL

## 2024-02-28 LAB
ANION GAP SERPL CALCULATED.3IONS-SCNC: 6 MMOL/L
ANION GAP SERPL CALCULATED.3IONS-SCNC: 9 MMOL/L
ANISOCYTOSIS BLD QL SMEAR: PRESENT
ATRIAL RATE: 91 BPM
BASE EXCESS BLDA CALC-SCNC: 5.6 MMOL/L
BASE EXCESS BLDA CALC-SCNC: 6.8 MMOL/L
BASE EXCESS BLDA CALC-SCNC: 7.3 MMOL/L
BUN SERPL-MCNC: 31 MG/DL (ref 5–25)
BUN SERPL-MCNC: 33 MG/DL (ref 5–25)
CALCIUM SERPL-MCNC: 10.5 MG/DL (ref 8.4–10.2)
CALCIUM SERPL-MCNC: 10.7 MG/DL (ref 8.4–10.2)
CHLORIDE SERPL-SCNC: 109 MMOL/L (ref 96–108)
CHLORIDE SERPL-SCNC: 111 MMOL/L (ref 96–108)
CO2 SERPL-SCNC: 32 MMOL/L (ref 21–32)
CO2 SERPL-SCNC: 33 MMOL/L (ref 21–32)
CREAT SERPL-MCNC: 0.58 MG/DL (ref 0.6–1.3)
CREAT SERPL-MCNC: 0.72 MG/DL (ref 0.6–1.3)
CRP SERPL QL: 133.9 MG/L
ERYTHROCYTE [DISTWIDTH] IN BLOOD BY AUTOMATED COUNT: 20.3 % (ref 11.6–15.1)
GFR SERPL CREATININE-BSD FRML MDRD: 101 ML/MIN/1.73SQ M
GFR SERPL CREATININE-BSD FRML MDRD: 92 ML/MIN/1.73SQ M
GLUCOSE SERPL-MCNC: 125 MG/DL (ref 65–140)
GLUCOSE SERPL-MCNC: 142 MG/DL (ref 65–140)
GLUCOSE SERPL-MCNC: 144 MG/DL (ref 65–140)
GLUCOSE SERPL-MCNC: 144 MG/DL (ref 65–140)
GLUCOSE SERPL-MCNC: 146 MG/DL (ref 65–140)
GLUCOSE SERPL-MCNC: 147 MG/DL (ref 65–140)
GLUCOSE SERPL-MCNC: 147 MG/DL (ref 65–140)
GLUCOSE SERPL-MCNC: 150 MG/DL (ref 65–140)
GLUCOSE SERPL-MCNC: 153 MG/DL (ref 65–140)
GLUCOSE SERPL-MCNC: 162 MG/DL (ref 65–140)
GLUCOSE SERPL-MCNC: 166 MG/DL (ref 65–140)
GLUCOSE SERPL-MCNC: 188 MG/DL (ref 65–140)
GLUCOSE SERPL-MCNC: 190 MG/DL (ref 65–140)
GLUCOSE SERPL-MCNC: 205 MG/DL (ref 65–140)
HCO3 BLDA-SCNC: 31.1 MMOL/L (ref 22–28)
HCO3 BLDA-SCNC: 32.1 MMOL/L (ref 22–28)
HCO3 BLDA-SCNC: 32.1 MMOL/L (ref 22–28)
HCT VFR BLD AUTO: 23 % (ref 34.8–46.1)
HCT VFR BLD AUTO: 24.1 % (ref 34.8–46.1)
HGB BLD-MCNC: 6.9 G/DL (ref 11.5–15.4)
HGB BLD-MCNC: 7.4 G/DL (ref 11.5–15.4)
HOROWITZ INDEX BLDA+IHG-RTO: 40 MM[HG]
HOROWITZ INDEX BLDA+IHG-RTO: 50 MM[HG]
MACROCYTES BLD QL AUTO: PRESENT
MCH RBC QN AUTO: 30.4 PG (ref 26.8–34.3)
MCHC RBC AUTO-ENTMCNC: 30 G/DL (ref 31.4–37.4)
MCV RBC AUTO: 101 FL (ref 82–98)
NRBC BLD AUTO-RTO: 1 /100 WBCS
O2 CT BLDA-SCNC: 10.3 ML/DL (ref 16–23)
O2 CT BLDA-SCNC: 11.4 ML/DL (ref 16–23)
O2 CT BLDA-SCNC: 13.7 ML/DL (ref 16–23)
OXYHGB MFR BLDA: 93.8 % (ref 94–97)
OXYHGB MFR BLDA: 94.1 % (ref 94–97)
OXYHGB MFR BLDA: 96.9 % (ref 94–97)
P AXIS: 73 DEGREES
PCO2 BLDA: 47.4 MM HG (ref 36–44)
PCO2 BLDA: 50 MM HG (ref 36–44)
PCO2 BLDA: 51.1 MM HG (ref 36–44)
PEEP RESPIRATORY: 12 CM[H2O]
PEEP RESPIRATORY: 8 CM[H2O]
PH BLDA: 7.41 [PH] (ref 7.35–7.45)
PH BLDA: 7.42 [PH] (ref 7.35–7.45)
PH BLDA: 7.45 [PH] (ref 7.35–7.45)
PLATELET BLD QL SMEAR: ABNORMAL
PMV BLD AUTO: 11.9 FL (ref 8.9–12.7)
PO2 BLDA: 121.1 MM HG (ref 75–129)
PO2 BLDA: 74.8 MM HG (ref 75–129)
PO2 BLDA: 81.5 MM HG (ref 75–129)
POTASSIUM SERPL-SCNC: 3 MMOL/L (ref 3.5–5.3)
POTASSIUM SERPL-SCNC: 3.8 MMOL/L (ref 3.5–5.3)
PR INTERVAL: 156 MS
PRESSURE CONTROL: 14
PRESSURE CONTROL: 14
QRS AXIS: 66 DEGREES
QRSD INTERVAL: 76 MS
QT INTERVAL: 336 MS
QTC INTERVAL: 413 MS
RBC # BLD AUTO: 2.27 MILLION/UL (ref 3.81–5.12)
RBC MORPH BLD: NORMAL
SODIUM SERPL-SCNC: 150 MMOL/L (ref 135–147)
SODIUM SERPL-SCNC: 150 MMOL/L (ref 135–147)
SPECIMEN SOURCE: ABNORMAL
T WAVE AXIS: 78 DEGREES
TRIGL SERPL-MCNC: 282 MG/DL
VENT AC: 24
VENT AC: 24
VENT- AC: AC
VENT- AC: AC
VENTRICULAR RATE: 91 BPM
WBC # BLD AUTO: 9.97 THOUSAND/UL (ref 4.31–10.16)

## 2024-02-28 PROCEDURE — 93010 ELECTROCARDIOGRAM REPORT: CPT | Performed by: INTERNAL MEDICINE

## 2024-02-28 PROCEDURE — 94760 N-INVAS EAR/PLS OXIMETRY 1: CPT

## 2024-02-28 PROCEDURE — NC001 PR NO CHARGE: Performed by: INTERNAL MEDICINE

## 2024-02-28 PROCEDURE — 94640 AIRWAY INHALATION TREATMENT: CPT

## 2024-02-28 PROCEDURE — 99232 SBSQ HOSP IP/OBS MODERATE 35: CPT | Performed by: INTERNAL MEDICINE

## 2024-02-28 PROCEDURE — 84478 ASSAY OF TRIGLYCERIDES: CPT

## 2024-02-28 PROCEDURE — 85027 COMPLETE CBC AUTOMATED: CPT

## 2024-02-28 PROCEDURE — 82948 REAGENT STRIP/BLOOD GLUCOSE: CPT

## 2024-02-28 PROCEDURE — 80048 BASIC METABOLIC PNL TOTAL CA: CPT

## 2024-02-28 PROCEDURE — 82805 BLOOD GASES W/O2 SATURATION: CPT | Performed by: STUDENT IN AN ORGANIZED HEALTH CARE EDUCATION/TRAINING PROGRAM

## 2024-02-28 PROCEDURE — 86140 C-REACTIVE PROTEIN: CPT | Performed by: STUDENT IN AN ORGANIZED HEALTH CARE EDUCATION/TRAINING PROGRAM

## 2024-02-28 PROCEDURE — 85018 HEMOGLOBIN: CPT | Performed by: STUDENT IN AN ORGANIZED HEALTH CARE EDUCATION/TRAINING PROGRAM

## 2024-02-28 PROCEDURE — 99291 CRITICAL CARE FIRST HOUR: CPT | Performed by: INTERNAL MEDICINE

## 2024-02-28 PROCEDURE — 71045 X-RAY EXAM CHEST 1 VIEW: CPT

## 2024-02-28 PROCEDURE — 85014 HEMATOCRIT: CPT | Performed by: STUDENT IN AN ORGANIZED HEALTH CARE EDUCATION/TRAINING PROGRAM

## 2024-02-28 PROCEDURE — 94003 VENT MGMT INPAT SUBQ DAY: CPT

## 2024-02-28 PROCEDURE — 93005 ELECTROCARDIOGRAM TRACING: CPT

## 2024-02-28 RX ORDER — ALBUMIN, HUMAN INJ 5% 5 %
SOLUTION INTRAVENOUS
Status: COMPLETED
Start: 2024-02-28 | End: 2024-02-28

## 2024-02-28 RX ORDER — FENTANYL CITRATE/PF 50 MCG/ML
SYRINGE (ML) INJECTION
Status: DISCONTINUED
Start: 2024-02-28 | End: 2024-02-28 | Stop reason: WASHOUT

## 2024-02-28 RX ORDER — METHYLPREDNISOLONE SODIUM SUCCINATE 125 MG/2ML
125 INJECTION, POWDER, LYOPHILIZED, FOR SOLUTION INTRAMUSCULAR; INTRAVENOUS EVERY 8 HOURS SCHEDULED
Status: DISCONTINUED | OUTPATIENT
Start: 2024-02-28 | End: 2024-02-29

## 2024-02-28 RX ORDER — ALBUMIN, HUMAN INJ 5% 5 %
25 SOLUTION INTRAVENOUS ONCE
Status: COMPLETED | OUTPATIENT
Start: 2024-02-28 | End: 2024-02-28

## 2024-02-28 RX ORDER — POTASSIUM CHLORIDE 20 MEQ/1
40 TABLET, EXTENDED RELEASE ORAL ONCE
Status: DISCONTINUED | OUTPATIENT
Start: 2024-02-28 | End: 2024-02-28

## 2024-02-28 RX ORDER — POTASSIUM CHLORIDE 20MEQ/15ML
40 LIQUID (ML) ORAL ONCE
Status: COMPLETED | OUTPATIENT
Start: 2024-02-28 | End: 2024-02-28

## 2024-02-28 RX ORDER — POTASSIUM CHLORIDE 29.8 MG/ML
40 INJECTION INTRAVENOUS ONCE
Status: DISCONTINUED | OUTPATIENT
Start: 2024-02-28 | End: 2024-02-28

## 2024-02-28 RX ORDER — FUROSEMIDE 10 MG/ML
40 INJECTION INTRAMUSCULAR; INTRAVENOUS ONCE
Status: COMPLETED | OUTPATIENT
Start: 2024-02-28 | End: 2024-02-28

## 2024-02-28 RX ORDER — POTASSIUM CHLORIDE 29.8 MG/ML
40 INJECTION INTRAVENOUS ONCE
Status: COMPLETED | OUTPATIENT
Start: 2024-02-28 | End: 2024-02-28

## 2024-02-28 RX ORDER — OXYCODONE HCL 5 MG/5 ML
5 SOLUTION, ORAL ORAL EVERY 6 HOURS
Status: DISCONTINUED | OUTPATIENT
Start: 2024-02-28 | End: 2024-03-01

## 2024-02-28 RX ADMIN — VENLAFAXINE 25 MG: 25 TABLET ORAL at 12:19

## 2024-02-28 RX ADMIN — METOCLOPRAMIDE HYDROCHLORIDE 5 MG: 5 INJECTION INTRAMUSCULAR; INTRAVENOUS at 04:42

## 2024-02-28 RX ADMIN — IPRATROPIUM BROMIDE 0.5 MG: 0.5 SOLUTION RESPIRATORY (INHALATION) at 13:58

## 2024-02-28 RX ADMIN — POTASSIUM CHLORIDE 40 MEQ: 1.5 SOLUTION ORAL at 10:31

## 2024-02-28 RX ADMIN — SODIUM CHLORIDE 6 UNITS/HR: 9 INJECTION, SOLUTION INTRAVENOUS at 16:31

## 2024-02-28 RX ADMIN — GABAPENTIN 200 MG: 250 SOLUTION ORAL at 08:57

## 2024-02-28 RX ADMIN — VASOPRESSIN 0.04 UNITS/MIN: 20 INJECTION INTRAVENOUS at 12:02

## 2024-02-28 RX ADMIN — ACETAMINOPHEN 650 MG: 325 TABLET, FILM COATED ORAL at 10:31

## 2024-02-28 RX ADMIN — METHYLPREDNISOLONE SODIUM SUCCINATE 125 MG: 125 INJECTION, POWDER, FOR SOLUTION INTRAMUSCULAR; INTRAVENOUS at 22:04

## 2024-02-28 RX ADMIN — GABAPENTIN 200 MG: 250 SOLUTION ORAL at 22:00

## 2024-02-28 RX ADMIN — LAMOTRIGINE 150 MG: 100 TABLET ORAL at 17:14

## 2024-02-28 RX ADMIN — HYDROMORPHONE HYDROCHLORIDE 0.5 MG: 1 INJECTION, SOLUTION INTRAMUSCULAR; INTRAVENOUS; SUBCUTANEOUS at 18:14

## 2024-02-28 RX ADMIN — OXYCODONE HYDROCHLORIDE 5 MG: 5 SOLUTION ORAL at 22:01

## 2024-02-28 RX ADMIN — CHLORHEXIDINE GLUCONATE 0.12% ORAL RINSE 15 ML: 1.2 LIQUID ORAL at 08:27

## 2024-02-28 RX ADMIN — POTASSIUM CHLORIDE 40 MEQ: 29.8 INJECTION, SOLUTION INTRAVENOUS at 06:32

## 2024-02-28 RX ADMIN — VENLAFAXINE 25 MG: 25 TABLET ORAL at 17:17

## 2024-02-28 RX ADMIN — LEVALBUTEROL HYDROCHLORIDE 1.25 MG: 1.25 SOLUTION RESPIRATORY (INHALATION) at 13:58

## 2024-02-28 RX ADMIN — METOCLOPRAMIDE HYDROCHLORIDE 5 MG: 5 INJECTION INTRAMUSCULAR; INTRAVENOUS at 20:04

## 2024-02-28 RX ADMIN — DEXMEDETOMIDINE HYDROCHLORIDE 0.8 MCG/KG/HR: 4 INJECTION, SOLUTION INTRAVENOUS at 18:19

## 2024-02-28 RX ADMIN — TOPIRAMATE 100 MG: 100 TABLET, FILM COATED ORAL at 17:17

## 2024-02-28 RX ADMIN — ACETAMINOPHEN 650 MG: 325 TABLET, FILM COATED ORAL at 22:01

## 2024-02-28 RX ADMIN — TOPIRAMATE 100 MG: 100 TABLET, FILM COATED ORAL at 08:57

## 2024-02-28 RX ADMIN — CHLORHEXIDINE GLUCONATE 0.12% ORAL RINSE 15 ML: 1.2 LIQUID ORAL at 22:00

## 2024-02-28 RX ADMIN — LEVOFLOXACIN 750 MG: 750 INJECTION, SOLUTION INTRAVENOUS at 10:31

## 2024-02-28 RX ADMIN — OXYCODONE HYDROCHLORIDE 5 MG: 5 SOLUTION ORAL at 06:05

## 2024-02-28 RX ADMIN — OXYCODONE HYDROCHLORIDE 5 MG: 5 SOLUTION ORAL at 17:16

## 2024-02-28 RX ADMIN — LAMOTRIGINE 150 MG: 100 TABLET ORAL at 08:26

## 2024-02-28 RX ADMIN — METHYLPREDNISOLONE SODIUM SUCCINATE 125 MG: 125 INJECTION, POWDER, FOR SOLUTION INTRAMUSCULAR; INTRAVENOUS at 00:07

## 2024-02-28 RX ADMIN — FAMOTIDINE 20 MG: 20 TABLET, FILM COATED ORAL at 17:17

## 2024-02-28 RX ADMIN — NYSTATIN: 100000 POWDER TOPICAL at 08:27

## 2024-02-28 RX ADMIN — GABAPENTIN 200 MG: 250 SOLUTION ORAL at 17:17

## 2024-02-28 RX ADMIN — SENNOSIDES 8.6 MG: 8.6 TABLET, FILM COATED ORAL at 17:17

## 2024-02-28 RX ADMIN — VASOPRESSIN 0.04 UNITS/MIN: 20 INJECTION INTRAVENOUS at 21:30

## 2024-02-28 RX ADMIN — ENOXAPARIN SODIUM 40 MG: 40 INJECTION SUBCUTANEOUS at 08:26

## 2024-02-28 RX ADMIN — METOCLOPRAMIDE HYDROCHLORIDE 5 MG: 5 INJECTION INTRAMUSCULAR; INTRAVENOUS at 12:09

## 2024-02-28 RX ADMIN — VENLAFAXINE 25 MG: 25 TABLET ORAL at 08:22

## 2024-02-28 RX ADMIN — ACETAMINOPHEN 650 MG: 325 TABLET, FILM COATED ORAL at 04:41

## 2024-02-28 RX ADMIN — IPRATROPIUM BROMIDE 0.5 MG: 0.5 SOLUTION RESPIRATORY (INHALATION) at 17:42

## 2024-02-28 RX ADMIN — FAMOTIDINE 20 MG: 20 TABLET, FILM COATED ORAL at 08:26

## 2024-02-28 RX ADMIN — NYSTATIN: 100000 POWDER TOPICAL at 17:40

## 2024-02-28 RX ADMIN — ACETAMINOPHEN 650 MG: 325 TABLET, FILM COATED ORAL at 17:16

## 2024-02-28 RX ADMIN — LEVALBUTEROL HYDROCHLORIDE 1.25 MG: 1.25 SOLUTION RESPIRATORY (INHALATION) at 17:44

## 2024-02-28 RX ADMIN — SENNOSIDES 8.6 MG: 8.6 TABLET, FILM COATED ORAL at 08:26

## 2024-02-28 RX ADMIN — IPRATROPIUM BROMIDE 0.5 MG: 0.5 SOLUTION RESPIRATORY (INHALATION) at 07:33

## 2024-02-28 RX ADMIN — POLYETHYLENE GLYCOL 3350 17 G: 17 POWDER, FOR SOLUTION ORAL at 08:26

## 2024-02-28 RX ADMIN — ALBUMIN (HUMAN) 25 G: 12.5 INJECTION, SOLUTION INTRAVENOUS at 13:28

## 2024-02-28 RX ADMIN — LEVALBUTEROL HYDROCHLORIDE 1.25 MG: 1.25 SOLUTION RESPIRATORY (INHALATION) at 07:33

## 2024-02-28 RX ADMIN — DEXMEDETOMIDINE HYDROCHLORIDE 0.6 MCG/KG/HR: 4 INJECTION, SOLUTION INTRAVENOUS at 09:00

## 2024-02-28 RX ADMIN — FUROSEMIDE 40 MG: 10 INJECTION, SOLUTION INTRAMUSCULAR; INTRAVENOUS at 12:21

## 2024-02-28 RX ADMIN — METHYLPREDNISOLONE SODIUM SUCCINATE 125 MG: 125 INJECTION, POWDER, FOR SOLUTION INTRAMUSCULAR; INTRAVENOUS at 05:26

## 2024-02-28 RX ADMIN — METHYLPREDNISOLONE SODIUM SUCCINATE 125 MG: 125 INJECTION, POWDER, FOR SOLUTION INTRAMUSCULAR; INTRAVENOUS at 13:28

## 2024-02-28 RX ADMIN — MIDAZOLAM 2 MG: 1 INJECTION INTRAMUSCULAR; INTRAVENOUS at 13:11

## 2024-02-28 RX ADMIN — POTASSIUM CHLORIDE 40 MEQ: 29.8 INJECTION, SOLUTION INTRAVENOUS at 09:05

## 2024-02-28 RX ADMIN — ALBUMIN, HUMAN INJ 5% 25 G: 5 SOLUTION at 13:28

## 2024-02-28 NOTE — UTILIZATION REVIEW
"Continued Stay Review    Date: 02/28                          Current Patient Class: IP  Current Level of Care: Level 2 stepdown/HOT    HPI:59 y.o. female initially admitted on 01/10     Assessment/Plan: Pt  remained intubated, weaning FiO2/PEEP, transient desaturation period yesterday - spontaneously resolved. Hypotension when off  and resumed.   continue mechanical vent support AC/PC InsP 14 Vt 400-420, cont rate 16, FiO2 0.6 and PEEP 10, continue slow PEEP and FiO2 wean for now, reduce solumedrol 125mg q8hrs, POCUS left chest to r/o layering effusion from CXR findings. Cont precedex, off propofol, slow wean dilaudid gtt, continue scheduled oxycodone - increase q6hrs. On Vaso, MAP goal >65. Cont TF. Mon ostomy output. cont water 350cc q4hrs, trend BMP q12hrs, replete electrolytes, add trial of diuresis now goal negative 500-1000cc as BP will permit. Cont IV abx. F/u cxs. Trend CRP q48h. Cont Insulin gtt.     Vital Signs: /65   Pulse 101   Temp 99.9 °F (37.7 °C)   Resp (!) 24   Ht 5' 8\" (1.727 m)   Wt 74.1 kg (163 lb 5.8 oz)   SpO2 97%   BMI 24.84 kg/m²       Pertinent Labs/Diagnostic Results:   02/28 XR:Lines and tubes appear satisfactory.     Bilateral left greater than right infiltrates which could be asymmetric pulmonary edema or pneumonia. Correlate with clinical scenario     EKG result: NSR        Results from last 7 days   Lab Units 02/28/24  0638 02/28/24  0430 02/27/24  1312 02/27/24  0946 02/27/24  0437 02/26/24  1446 02/25/24  2300 02/25/24  0419   WBC Thousand/uL  --  9.97  --   --  7.67 14.26*  --  8.19   HEMOGLOBIN g/dL 7.4* 6.9* 7.7* 7.1* 7.3* 10.0*  --  10.0*   I STAT HEMOGLOBIN   --   --   --   --   --   --    < >  --    HEMATOCRIT % 24.1* 23.0* 25.6*  --  24.5* 32.1*  --  31.3*   HEMATOCRIT, ISTAT   --   --   --   --   --   --    < >  --    PLATELETS Thousands/uL  --   --   --   --  104* 139*  --  103*   BANDS PCT %  --   --   --   --   --  13*  --  8    < > = values in this " interval not displayed.         Results from last 7 days   Lab Units 02/28/24  0430 02/27/24  1939 02/27/24  0437 02/26/24  1446 02/26/24  0130 02/25/24  2301 02/25/24  2300 02/25/24  0844 02/25/24  0419 02/24/24  0817 02/24/24  0413 02/23/24  1151 02/23/24  0602 02/22/24  0831 02/22/24  0436   SODIUM mmol/L 150* 152* 153* 154* 153* 155*  --    < > 149*   < > 146   < > 150*   < > 146   POTASSIUM mmol/L 3.0* 3.3* 3.5 5.3 3.3* 3.9  --    < > 3.3*   < > 3.9   < > 4.3   < > 5.0   CHLORIDE mmol/L 111* 113* 115* 114* 111* 108  --    < > 109*   < > 110*   < > 115*   < > 115*   CO2 mmol/L 33* 32 33* 29 33* 39*  --    < > 34*   < > 30   < > 28   < > 28   CO2, I-STAT mmol/L  --   --   --   --   --   --  38*  --   --   --   --   --   --   --   --    ANION GAP mmol/L 6 7 5 11 9 8  --    < > 6   < > 6   < > 7   < > 3   BUN mg/dL 31* 31* 32* 33* 30* 32*  --    < > 37*   < > 43*   < > 47*   < > 48*   CREATININE mg/dL 0.58* 0.61 0.61 0.64 0.50* 0.62  --    < > 0.59*   < > 0.70   < > 0.77   < > 0.99   EGFR ml/min/1.73sq m 101 100 100 98 107 99  --    < > 101   < > 95   < > 85   < > 63   CALCIUM mg/dL 10.5* 10.4* 10.1 9.5 9.3 9.9  --    < > 10.1   < > 10.5*   < > 10.0   < > 10.3*   CALCIUM, IONIZED mmol/L  --   --   --   --   --  1.31  --   --   --   --   --   --   --   --   --    CALCIUM, IONIZED, ISTAT mmol/L  --   --   --   --   --   --  1.39*  --   --   --   --   --   --   --   --    MAGNESIUM mg/dL  --   --  2.0  --   --   --   --   --  1.7*  --  1.6*  --  1.9  --  2.0   PHOSPHORUS mg/dL  --   --  3.3  --   --   --   --   --   --   --   --   --   --   --   --     < > = values in this interval not displayed.     Results from last 7 days   Lab Units 02/26/24  1446 02/25/24  2301   AST U/L 11* 10*   ALT U/L 16 17   ALK PHOS U/L 63 68   TOTAL PROTEIN g/dL 4.9* 4.8*   ALBUMIN g/dL 2.5* 2.4*   TOTAL BILIRUBIN mg/dL 0.78 0.70   BILIRUBIN DIRECT mg/dL 0.60*  --      Results from last 7 days   Lab Units 02/28/24  1223 02/28/24  0604  "02/28/24  0426 02/28/24  0212 02/28/24  0005 02/27/24  2207 02/27/24  2024 02/27/24  1829 02/27/24  1647 02/27/24  1407 02/27/24  1239 02/27/24  1115   POC GLUCOSE mg/dl 205* 144* 150* 166* 190* 188* 160* 146* 177* 215* 269* 207*     Results from last 7 days   Lab Units 02/28/24  0430 02/27/24  1939 02/27/24  0437 02/26/24  1446 02/26/24  0130 02/25/24  2301 02/25/24  1942 02/25/24  1646 02/25/24  1224 02/25/24  0844 02/25/24  0419 02/25/24  0017   GLUCOSE RANDOM mg/dL 147* 149* 136 219* 135 170* 128 95 155* 108 106 101     Results from last 7 days   Lab Units 02/24/24  0413 02/22/24  2018 02/22/24  0436 02/21/24 2112 02/21/24  2035   OSMOLALITY, SERUM mmol/* 332* 343* 334* 336*         No results found for: \"BETA-HYDROXYBUTYRATE\"   Results from last 7 days   Lab Units 02/28/24  0430 02/27/24  1323 02/27/24  1121   PH ART  7.416 7.318* 7.244*   PCO2 ART mm Hg 51.1* 56.8* 71.7*   PO2 ART mm Hg 121.1 60.3* 97.2   HCO3 ART mmol/L 32.1* 28.5* 30.3*   BASE EXC ART mmol/L 6.8 1.9 2.2   O2 CONTENT ART mL/dL 10.3* 10.1* 11.2*   O2 HGB, ARTERIAL % 96.9 88.4* 94.5   ABG SOURCE  Line, Arterial Line, Arterial Line, Arterial     Results from last 7 days   Lab Units 02/26/24  0728   PH NICA  7.288*   PCO2 NICA mm Hg 71.2*   PO2 NICA mm Hg 46.5*   HCO3 NICA mmol/L 33.3*   BASE EXC NICA mmol/L 4.8   O2 CONTENT NICA ml/dL 12.9   O2 HGB, VENOUS % 77.3     Results from last 7 days   Lab Units 02/25/24  2300   I STAT BASE EXC mmol/L 9*   I STAT O2 SAT % 79   ISTAT PH ART  7.331*   I STAT ART PCO2 mm HG 68.7*   I STAT ART PO2 mm HG 48.0*   I STAT ART HCO3 mmol/L 36.3*             Results from last 7 days   Lab Units 02/26/24  1446 02/25/24  2301   D-DIMER QUANTITATIVE ug/ml FEU 1.97* 1.58*                 Results from last 7 days   Lab Units 02/26/24  0130 02/25/24  2301 02/24/24  1245 02/22/24  0022 02/21/24  2112   LACTIC ACID mmol/L 2.0 3.1* 1.4 1.5 2.5*                         Results from last 7 days   Lab Units 02/22/24  0555 "   UNIT PRODUCT CODE  [id number]  [id number]   UNIT NUMBER  [id number]  [id number]   UNITABO  A  A   UNITRH  NEG  NEG   CROSSMATCH  Compatible  Compatible   UNIT DISPENSE STATUS  Presumed Trans  Presumed Trans   UNIT PRODUCT VOL mL 350  350         Results from last 7 days   Lab Units 02/26/24  0130   LIPASE u/L 71     Results from last 7 days   Lab Units 02/28/24  0430 02/27/24  0143 02/26/24  1446   CRP mg/L 133.9* 291.3* 338.1*         Results from last 7 days   Lab Units 02/24/24  0413 02/22/24  2018 02/22/24  0436 02/21/24  2112 02/21/24  2035   OSMOLALITY, SERUM mmol/* 332* 343* 334* 336*             Results from last 7 days   Lab Units 02/26/24  1251 02/26/24  1144 02/21/24  1713 02/21/24  1710 02/21/24  1704   BLOOD CULTURE  No Growth at 48 hrs. No Growth at 48 hrs.  --   --   --    GRAM STAIN RESULT   --   --  2+ Polys*  1+ Yeast* Rare Polys  No bacteria seen Rare Polys  No bacteria seen     Results from last 7 days   Lab Units 02/21/24  1710 02/21/24  1704   TOTAL COUNTED  100 100               Medications:   Scheduled Medications:  acetaminophen, 650 mg, Oral, Q6H  chlorhexidine, 15 mL, Mouth/Throat, Q12H SARTHAK  enoxaparin, 40 mg, Subcutaneous, Q24H SARTHAK  famotidine, 20 mg, Oral, BID  gabapentin, 200 mg, Oral, TID  ipratropium, 0.5 mg, Nebulization, TID  lamoTRIgine, 150 mg, Oral, BID  levalbuterol, 1.25 mg, Nebulization, TID  levofloxacin (LEVAQUIN) IVPB (premix in dextrose) 750 mg 150 mL q24h IV  methylPREDNISolone sodium succinate, 125 mg, Intravenous, Q8H SARTHKA  metoclopramide, 5 mg, Intravenous, Q8H  nystatin, , Topical, BID  oxyCODONE, 5 mg, Per NG Tube, Q6H  polyethylene glycol, 17 g, Per NG Tube, Daily  senna, 1 tablet, Per NG Tube, BID  topiramate, 100 mg, Per PEG Tube, BID  venlafaxine, 25 mg, Per NG Tube, TID With Meals      Continuous IV Infusions:  dexmedetomidine, 0.1-1.2 mcg/kg/hr, Intravenous, Titrated  HYDROmorphone, 0.75 mg/hr, Intravenous, Continuous  insulin regular  (HumuLIN R,NovoLIN R) 1 Units/mL in sodium chloride 0.9 % 100 mL infusion, 0.3-21 Units/hr, Intravenous, Titrated  vasopressin, 0.04 Units/min, Intravenous, Continuous      PRN Meds:  HYDROmorphone, 0.5 mg, Intravenous, Q1H PRN  midazolam, 2 mg, Intravenous, Q4H PRN  ondansetron, 4 mg, Intravenous, Q6H PRN        Discharge Plan: TBD    Network Utilization Review Department  ATTENTION: Please call with any questions or concerns to [phone] and carefully listen to the prompts so that you are directed to the right person. All voicemails are confidential.   For Discharge needs, contact Care Management DC Support Team at [phone] opt. [phone]  Send all requests for admission clinical reviews, approved or denied determinations and any other requests to dedicated fax number below belonging to the campus where the patient is receiving treatment. List of dedicated fax numbers for the Facilities:  FACILITY NAME UR FAX NUMBER   ADMISSION DENIALS (Administrative/Medical Necessity) [phone]   DISCHARGE SUPPORT TEAM (NETWORK) [phone]   PARENT CHILD HEALTH (Maternity/NICU/Pediatrics) [phone]   General acute hospital [phone]   [facility] [phone]   [facility] [phone]   [facility] [phone]   [facility] [phone]   [facility] [phone]   [facility] [phone]   [facility] [phone]   [facility] [phone]   Critical access hospital [phone]   [facility] [phone]   [facility] [phone]

## 2024-02-28 NOTE — PLAN OF CARE
Problem: Prexisting or High Potential for Compromised Skin Integrity  Goal: Skin integrity is maintained or improved  Description: INTERVENTIONS:  - Identify patients at risk for skin breakdown  - Assess and monitor skin integrity  - Assess and monitor nutrition and hydration status  - Monitor labs   - Assess for incontinence   - Turn and reposition patient  - Assist with mobility/ambulation  - Relieve pressure over bony prominences  - Avoid friction and shearing  - Provide appropriate hygiene as needed including keeping skin clean and dry  - Evaluate need for skin moisturizer/barrier cream  - Collaborate with interdisciplinary team   - Patient/family teaching  - Consider wound care consult   Outcome: Progressing     Problem: Nutrition/Hydration-ADULT  Goal: Nutrient/Hydration intake appropriate for improving, restoring or maintaining nutritional needs  Description: Monitor and assess patient's nutrition/hydration status for malnutrition. Collaborate with interdisciplinary team and initiate plan and interventions as ordered.  Monitor patient's weight and dietary intake as ordered or per policy. Utilize nutrition screening tool and intervene as necessary. Determine patient's food preferences and provide high-protein, high-caloric foods as appropriate.     INTERVENTIONS:  - Monitor oral intake, urinary output, labs, and treatment plans  - Assess nutrition and hydration status and recommend course of action  - Evaluate amount of meals eaten  - Assist patient with eating if necessary   - Allow adequate time for meals  - Recommend/ encourage appropriate diets, oral nutritional supplements, and vitamin/mineral supplements  - Order, calculate, and assess calorie counts as needed  - Recommend, monitor, and adjust tube feedings and TPN/PPN based on assessed needs  - Assess need for intravenous fluids  - Provide specific nutrition/hydration education as appropriate  - Include patient/family/caregiver in decisions related to  nutrition  Outcome: Progressing     Problem: SAFETY,RESTRAINT: NV/NON-SELF DESTRUCTIVE BEHAVIOR  Goal: Remains free of harm/injury (restraint for non violent/non self-detsructive behavior)  Description: INTERVENTIONS:  - Instruct patient/family regarding restraint use   - Assess and monitor physiologic and psychological status   - Provide interventions and comfort measures to meet assessed patient needs   - Identify and implement measures to help patient regain control  - Assess readiness for release of restraint   Outcome: Progressing  Goal: Returns to optimal restraint-free functioning  Description: INTERVENTIONS:  - Assess the patient's behavior and symptoms that indicate continued need for restraint  - Identify and implement measures to help patient regain control  - Assess readiness for release of restraint   Outcome: Progressing     Problem: INFECTION - ADULT  Goal: Absence or prevention of progression during hospitalization  Description: INTERVENTIONS:  - Assess and monitor for signs and symptoms of infection  - Monitor lab/diagnostic results  - Monitor all insertion sites, i.e. indwelling lines, tubes, and drains  - Monitor endotracheal if appropriate and nasal secretions for changes in amount and color  - [place] appropriate cooling/warming therapies per order  - Administer medications as ordered  - Instruct and encourage patient and family to use good hand hygiene technique  - Identify and instruct in appropriate isolation precautions for identified infection/condition  Outcome: Progressing     Problem: SAFETY ADULT  Goal: Patient will remain free of falls  Description: INTERVENTIONS:  - Educate patient/family on patient safety including physical limitations  - Instruct patient to call for assistance with activity   - Consult OT/PT to assist with strengthening/mobility   - Keep Call bell within reach  - Keep bed low and locked with side rails adjusted as appropriate  - Keep care items and personal  belongings within reach  - Initiate and maintain comfort rounds  - Make Fall Risk Sign visible to staff  - Offer Toileting every 2 Hours, in advance of need  - Initiate/Maintain bed alarm  - Obtain necessary fall risk management equipment: non skid footwear  - Apply yellow socks and bracelet for high fall risk patients  - Consider moving patient to room near nurses station  Outcome: Progressing     Problem: RESPIRATORY - ADULT  Goal: Achieves optimal ventilation and oxygenation  Description: INTERVENTIONS:  - Assess for changes in respiratory status  - Assess for changes in mentation and behavior  - Position to facilitate oxygenation and minimize respiratory effort  - Oxygen administered by appropriate delivery if ordered  - Initiate smoking cessation education as indicated  - Encourage broncho-pulmonary hygiene including cough, deep breathe, Incentive Spirometry  - Assess the need for suctioning and aspirate as needed  - Assess and instruct to report SOB or any respiratory difficulty  - Respiratory Therapy support as indicated  Outcome: Progressing     Problem: NEUROSENSORY - ADULT  Goal: Achieves stable or improved neurological status  Description: INTERVENTIONS  - Monitor and report changes in neurological status  - Monitor vital signs such as temperature, blood pressure, glucose, and any other labs ordered   - Initiate measures to prevent increased intracranial pressure  - Monitor for seizure activity and implement precautions if appropriate      Outcome: Progressing  Goal: Achieves maximal functionality and self care  Description: INTERVENTIONS  - Monitor swallowing and airway patency with patient fatigue and changes in neurological status  - Encourage and assist patient to increase activity and self care.   - Encourage visually impaired, hearing impaired and aphasic patients to use assistive/communication devices  Outcome: Progressing     Problem: CARDIOVASCULAR - ADULT  Goal: Maintains optimal cardiac output  and hemodynamic stability  Description: INTERVENTIONS:  - Monitor I/O, vital signs and rhythm  - Monitor for S/S and trends of decreased cardiac output  - Administer and titrate ordered vasoactive medications to optimize hemodynamic stability  - Assess quality of pulses, skin color and temperature  - Assess for signs of decreased coronary artery perfusion  - Instruct patient to report change in severity of symptoms  Outcome: Progressing  Goal: Absence of cardiac dysrhythmias or at baseline rhythm  Description: INTERVENTIONS:  - Continuous cardiac monitoring, vital signs, obtain 12 lead EKG if ordered  - Administer antiarrhythmic and heart rate control medications as ordered  - Monitor electrolytes and administer replacement therapy as ordered  Outcome: Progressing     Problem: GASTROINTESTINAL - ADULT  Goal: Minimal or absence of nausea and/or vomiting  Description: INTERVENTIONS:  - Administer IV fluids if ordered to ensure adequate hydration  - Maintain NPO status until nausea and vomiting are resolved  - Nasogastric tube if ordered  - Administer ordered antiemetic medications as needed  - Provide nonpharmacologic comfort measures as appropriate  - Advance diet as tolerated, if ordered  - Consider nutrition services referral to assist patient with adequate nutrition and appropriate food choices  Outcome: Progressing  Goal: Maintains or returns to baseline bowel function  Description: INTERVENTIONS:  - Assess bowel function  - Encourage oral fluids to ensure adequate hydration  - Administer IV fluids if ordered to ensure adequate hydration  - Administer ordered medications as needed  - Encourage mobilization and activity  - Consider nutritional services referral to assist patient with adequate nutrition and appropriate food choices  Outcome: Progressing  Goal: Maintains adequate nutritional intake  Description: INTERVENTIONS:  - Monitor percentage of each meal consumed  - Identify factors contributing to decreased  intake, treat as appropriate  - Assist with meals as needed  - Monitor I&O, weight, and lab values if indicated  - Obtain nutrition services referral as needed  Outcome: Progressing  Goal: Establish and maintain optimal ostomy function  Description: INTERVENTIONS:  - Assess bowel function  - Encourage oral fluids to ensure adequate hydration  - Administer IV fluids if ordered to ensure adequate hydration   - Administer ordered medications as needed  - Encourage mobilization and activity  - Nutrition services referral to assist patient with appropriate food choices  - Assess stoma site  - Consider wound care consult   Outcome: Progressing  Goal: Oral mucous membranes remain intact  Description: INTERVENTIONS  - Assess oral mucosa and hygiene practices  - Implement preventative oral hygiene regimen  - Implement oral medicated treatments as ordered  - Initiate Nutrition services referral as needed  Outcome: Progressing     Problem: GENITOURINARY - ADULT  Goal: Maintains or returns to baseline urinary function  Description: INTERVENTIONS:  - Assess urinary function  - Encourage oral fluids to ensure adequate hydration if ordered  - Administer IV fluids as ordered to ensure adequate hydration  - Administer ordered medications as needed  - Offer frequent toileting  - Follow urinary retention protocol if ordered  Outcome: Progressing  Goal: Urinary catheter remains patent  Description: INTERVENTIONS:  - Assess patency of urinary catheter  - If patient has a chronic marie, consider changing catheter if non-functioning  - Follow guidelines for intermittent irrigation of non-functioning urinary catheter  Outcome: Progressing     Problem: METABOLIC, FLUID AND ELECTROLYTES - ADULT  Goal: Electrolytes maintained within normal limits  Description: INTERVENTIONS:  - Monitor labs and assess patient for signs and symptoms of electrolyte imbalances  - Administer electrolyte replacement as ordered  - Monitor response to electrolyte  replacements, including repeat lab results as appropriate  - Instruct patient on fluid and nutrition as appropriate  Outcome: Progressing  Goal: Fluid balance maintained  Description: INTERVENTIONS:  - Monitor labs   - Monitor I/O and WT  - Instruct patient on fluid and nutrition as appropriate  - Assess for signs & symptoms of volume excess or deficit  Outcome: Progressing  Goal: Glucose maintained within target range  Description: INTERVENTIONS:  - Monitor Blood Glucose as ordered  - Assess for signs and symptoms of hyperglycemia and hypoglycemia  - Administer ordered medications to maintain glucose within target range  - Assess nutritional intake and initiate nutrition service referral as needed  Outcome: Progressing     Problem: HEMATOLOGIC - ADULT  Goal: Maintains hematologic stability  Description: INTERVENTIONS  - Assess for signs and symptoms of bleeding or hemorrhage  - Monitor labs  - Administer supportive blood products/factors as ordered and appropriate  Outcome: Progressing     Problem: MUSCULOSKELETAL - ADULT  Goal: Maintain or return mobility to safest level of function  Description: INTERVENTIONS:  - Assess patient's ability to carry out ADLs; assess patient's baseline for ADL function and identify physical deficits which impact ability to perform ADLs (bathing, care of mouth/teeth, toileting, grooming, dressing, etc.)  - Assess/evaluate cause of self-care deficits   - Assess range of motion  - Assess patient's mobility  - Assess patient's need for assistive devices and provide as appropriate  - Encourage maximum independence but intervene and supervise when necessary  - Involve family in performance of ADLs  - Assess for home care needs following discharge   - Consider OT consult to assist with ADL evaluation and planning for discharge  - Provide patient education as appropriate  Outcome: Progressing     Problem: COPING  Goal: Pt/Family able to verbalize concerns and demonstrate effective coping  strategies  Description: INTERVENTIONS:  - Assist patient/family to identify coping skills, available support systems and cultural and spiritual values  - Provide emotional support, including active listening and acknowledgement of concerns of patient and caregivers  - Reduce environmental stimuli, as able  - Provide patient education  - Assess for spiritual pain/suffering and initiate spiritual care, including notification of Pastoral Care or [place] based community as needed  - Assess effectiveness of coping strategies  Outcome: Progressing  Goal: Will report anxiety at manageable levels  Description: INTERVENTIONS:  - Administer medication as ordered  - Teach and encourage coping skills  - Provide emotional support  - Assess patient/family for anxiety and ability to cope  Outcome: Progressing     Problem: BEHAVIOR  Goal: Pt/Family maintain appropriate behavior and adhere to behavioral management agreement, if implemented  Description: INTERVENTIONS:  - Assess the family dynamic   - Encourage verbalization of thoughts and concerns in a socially appropriate manner  - Assess patient/family's coping skills and non-compliant behavior (including use of illegal substances).  - Utilize positive, consistent limit setting strategies supporting safety of patient, staff and others  - Initiate consult with Case Management, Spiritual Care or other ancillary services as appropriate  - If a patient's/visitor's behavior jeopardizes the safety of the patient, staff, or others, refer to organization procedure.   - Notify Security of behavior or suspected illegal substances which indicate the need for search of the patient and/or belongings  - Encourage participation in the decision making process about a behavioral management agreement; implement if patient meets criteria  Outcome: Progressing     Problem: Potential for Falls  Goal: Patient will remain free of falls  Description: INTERVENTIONS:  - Educate patient/family on patient  safety including physical limitations  - Instruct patient to call for assistance with activity   - Consult OT/PT to assist with strengthening/mobility   - Keep Call bell within reach  - Keep bed low and locked with side rails adjusted as appropriate  - Keep care items and personal belongings within reach  - Initiate and maintain comfort rounds  - Make Fall Risk Sign visible to staff  - Offer Toileting every 2 Hours, in advance of need  - Initiate/Maintain bed alarm  - Obtain necessary fall risk management equipment: non skid footwear  - Apply yellow socks and bracelet for high fall risk patients  - Consider moving patient to room near nurses station  Outcome: Progressing

## 2024-02-28 NOTE — PROGRESS NOTES
Critical Care Attending Note     59 years old with B-Cell lymphoma - completed rituximab in Dec 2023, seizure disorder, anxiety, CKD III, presented 1/7 for encephalopathy. Found to have COVID-19 infection.  Treated Remdesivir/Decadron/actemra, extensive neurologic workup negative except very small SAH (LP, imaging, vEEG).  She has had protracted course with repeated episodes of worsened hypoxic respiratory failure to include multiple bronchoscopies with treatment for possible drug induced or COVID pneumonitis and Stenotrophomonas.  She has previously clinically improved on steroid courses.  Returned ICU 2/1 - pulse dosed steroids 2/5-2/8 with further taper, required intubation 2/13 also with severe epistaxis.  IVIG course completed 2/15. Repeated Remdesivir course 2/20-2/25.  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       24hr events - remained intubated, weaning FiO2/PEEP, transient desaturation period yesterday - spontaneously resolved. Hypotension when off  and resumed.      VS AF, HR 's, MAPS 60-80's, SpO2 96% 0.6/10P, I/Os +630cc  Exam  GEN middle aged woman in bed, intubated, sedated opens eyes, will not follow commands, appears to track  HEENT ATNC, no nystagmus, MMM, no scleral icterus  NECK no accessory muscle use, TLC w/o purulence  CV reg, single s1/2, no m/r  Pulm mechanical BS, clear/white secretions mild, no wheeze or rales, pltP 22  ABD +BS soft ND, no rebound, nonrigid, colostomy in place brown soft stool, incision dressing CDI  EXT warm, brisk cap refill, 1+ diffuse anasarca, no rash   marie in place yellow urine    Laboratory and Diagnostics  Results from last 7 days   Lab Units 02/28/24  0638 02/28/24  0430 02/27/24  1312 02/27/24  0946 02/27/24  0437 02/26/24  1446 02/25/24  2300 02/25/24  0419 02/24/24  0413 02/23/24  0602 02/22/24  0436   WBC Thousand/uL  --  9.97  --   --  7.67 14.26*  --  8.19 10.30* 10.61* 23.58*   HEMOGLOBIN g/dL 7.4*  6.9* 7.7* 7.1* 7.3* 10.0*  --  10.0* 10.0* 9.3* 11.4*   I STAT HEMOGLOBIN g/dl  --   --   --   --   --   --  9.9*  --   --   --   --    HEMATOCRIT % 24.1* 23.0* 25.6*  --  24.5* 32.1*  --  31.3* 29.2* 29.1* 36.0   HEMATOCRIT, ISTAT %  --   --   --   --   --   --  29*  --   --   --   --    PLATELETS Thousands/uL  --   --   --   --  104* 139*  --  103* 98*  --  237   BANDS PCT %  --   --   --   --   --  13*  --  8  --   --   --    MONO PCT %  --   --   --   --   --  1*  --  2*  --   --   --    EOS PCT %  --   --   --   --   --  0  --  3  --   --   --      Results from last 7 days   Lab Units 02/28/24  0430 02/27/24  1939 02/27/24  0437 02/26/24  1446 02/26/24  0130 02/25/24  2301 02/25/24  2300 02/25/24  1942   SODIUM mmol/L 150* 152* 153* 154* 153* 155*  --  151*   POTASSIUM mmol/L 3.0* 3.3* 3.5 5.3 3.3* 3.9  --  3.5   CHLORIDE mmol/L 111* 113* 115* 114* 111* 108  --  112*   CO2 mmol/L 33* 32 33* 29 33* 39*  --  32   CO2, I-STAT mmol/L  --   --   --   --   --   --  38*  --    ANION GAP mmol/L 6 7 5 11 9 8  --  7   BUN mg/dL 31* 31* 32* 33* 30* 32*  --  32*   CREATININE mg/dL 0.58* 0.61 0.61 0.64 0.50* 0.62  --  0.51*   CALCIUM mg/dL 10.5* 10.4* 10.1 9.5 9.3 9.9  --  9.8   GLUCOSE RANDOM mg/dL 147* 149* 136 219* 135 170*  --  128   ALT U/L  --   --   --  16  --  17  --   --    AST U/L  --   --   --  11*  --  10*  --   --    ALK PHOS U/L  --   --   --  63  --  68  --   --    ALBUMIN g/dL  --   --   --  2.5*  --  2.4*  --   --    TOTAL BILIRUBIN mg/dL  --   --   --  0.78  --  0.70  --   --      Results from last 7 days   Lab Units 02/27/24  0437 02/25/24  0419 02/24/24  0413 02/23/24  0602 02/22/24  0436   MAGNESIUM mg/dL 2.0 1.7* 1.6* 1.9 2.0   PHOSPHORUS mg/dL 3.3  --   --   --   --                Results from last 7 days   Lab Units 02/26/24  0130 02/25/24  2301 02/24/24  1245 02/22/24  0022 02/21/24  2112   LACTIC ACID mmol/L 2.0 3.1* 1.4 1.5 2.5*     Results from last 7 days   Lab Units 02/28/24  0430  02/27/24  0143 02/26/24  1446   CRP mg/L 133.9* 291.3* 338.1*             Results from last 7 days   Lab Units 02/26/24  1446 02/25/24  2301   D-DIMER QUANTITATIVE ug/ml FEU 1.97* 1.58*           ABG:   Results from last 7 days   Lab Units 02/28/24  0430   PH ART  7.416   PCO2 ART mm Hg 51.1*   PO2 ART mm Hg 121.1   HCO3 ART mmol/L 32.1*   BASE EXC ART mmol/L 6.8   ABG SOURCE  Line, Arterial       -->233-->282  lipase normal     MICRO  Bld CX 2/26 - NGTD  COVID PCR 2/21 - pending  Bronch BAL 2/21 4+ candida, few colonies MDR Pseudomonas  MRSA neg 2/18  BAL COVID (+) 2/16  PCP DFA neg 2/16  Sputum 2/9 - Stenotrophomonas      RADIOGRAPHS - images personally reviewed  CXR 2/28 - T/L good position, R>L alveolar infiltrates, no PTX, no dense consolidation    Upper Ext US 2/26 - neg for DVT, (+) superficial thrombophlebitis b/l     CT angio 2/26 - no PE, persistent bilateral ground glass infiltrates with some mild improvement from priori scans, bibasilar consolidations, no sig effusions, small ascites, no new intra-abdominal pathology     TTE 2/2024 - EF 70%, grade I diastolic dysfunction     Assessment  Acute hypoxic respiratory failure - D#15 intubation  Abnormal CT chest - concerning for likely persistent COVID pneumonitis in setting of rituximab dosing vs drug induced pneumonitis, less likely autoimmune pneumonitis given negative serology testing  Stenotrophomonas/Pseudomonas pneumonia - completed prior course, likely airway colonization, now on levofloxacin given clinical decline  Shock - mixed septic, hypovolemic  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  Toxic/metabolic encephalopathy  Thrombocytopenia, anemia   B-Cell lymphoma  Minimal SAH POA  Seizure disorder  Steroid induced hyperglycemia  Elevated TG  Hypernatremia  Hypokalemia  CKD, resolved ELIESER  Upper ext thrombophlebitis      PLAN  NEURO - continue topamax and lamictal, Continue precedex, off propofol, slow wean dilaudid gtt,  continue scheduled oxycodone - increase q6hrs, prn versed if needed, goal RASS -2 to 0  CARDIAC -  Cont  x 24hrs then attempt to wean off, goal MAP >65,   PULM - continue mechanical vent support AC/PC InsP 14 Vt 400-420, cont rate 16, FiO2 0.6 and PEEP 10, continue slow PEEP and FiO2 wean for now, reduce solumedrol 125mg q8hrs, POCUS left chest to r/o layering effusion from CXR findings   GI - cont TFs to goal,  monitor ostomy output  RENAL - cont water 350cc q4hrs, trend BMP q12hrs, replete electrolytes, add trial of diuresis now goal negative 500-1000cc as BP will permit  ID - completed second course remdesivir, continue levofloxacin per ID, follow up cultures, trend DDimer, CRP q48hrs  ENDO - continue insulin gtt  HEME - B cell lymphoma, no active treatments  ICU Care - continue SCDs/LMWH, CHG, HOB >3deg, continue pepcid  I again updated patient's  extensively at bedside, all questions answered    Critical Care Time excluding procedures, teaching and updates is 42 minutes    Juvencio Kemp,

## 2024-02-28 NOTE — PROGRESS NOTES
Progress Note - Infectious Disease   Carla Hunt 59 y.o. female MRN: [id number]  [facility]: [id number]      Impression/Recommendations:  Possible bacterial pneumonia.  Initially, on admission early January, secondary to mostly COVID but there was concern for concurrent bacterial pneumonia on admission due to elevated procalcitonin. Patient completed treatment course for both COVID and bacterial pneumonia.  She was clinically improved with decreasing O2 requirement.  However, patient deteriorated in late January, requiring to be placed back on high flow O2 support.  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  Procalcitonin x 3 were in the normal range.  Patient has no fever or leukocytosis.  She improved with increasing steroid dose.  She completed a 5-day course of Bactrim/minocycline for presumptive stenotrophomonas respiratory infection, with expectorated sputum growing stenotrophomonas.  She subsequently had bronchoscopy, with BAL culture negative for bacterial growth, AFB and PCP but completed the course of Bactrim/minocycline prior to bronchoscopy.  Patient improved and O2 was being weaned again until 2 weeks ago, when she deteriorated again.  She is now back in ICU.  Expectorated sputum once again is growing stenotrophomonas.  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  Not sure that the stenotrophomonas that is growing again and expectorated sputum is pathogenic versus upper airway colonization.  Patient was restarted on high-dose steroid and Bactrim.  Patient's respiratory status remained tenuous throughout, required intubation subsequently.  She had bronchoscopy on 2/16 with moderate secretion.  BAL culture had no growth but COVID PCR was positive.  Bactrim was changed to Levaquin over weekend due to ELIESER.  Remdesivir was restarted for possible COVID relapse.  Due to worsening consolidations on CT,  patient is status post repeat bronchoscopy on 2/21, with some purulent secretion.  BAL culture had light growth of Pseudomonas and stenotrophomonas.  Given that patient's Pseudomonas isolate is susceptible to Levaquin and stenotrophomonas isolate is susceptible to both Bactrim and Levaquin and with patient having been on Levaquin for the last 10 days (and Bactrim for the prior 10 days), both Pseudomonas and stenotrophomonas isolates are most likely airway colonization.  Growth of Candida is most likely airway colonization also.  Repeat blood cultures have no growth.  With systemic and respiratory improvement on high-dose steroid, antibiotic can be discontinued.  Discontinue Levaquin.    Monitor temperature/WBC.  Monitor respiratory status.  Follow-up repeat blood cultures.     Severe COVID, present on admission.  Patient was treated with a 10-day course of remdesivir, dexamethasone and was given 1 dose of Tocilizumab on admission.  She also had a course of antibiotic initially for presumptive bacterial superinfection.  COVID antigen was negative prior to coming off isolation.  Due to positive COVID PCR in BAL, patient completed another 5-day course of remdesivir.  Patient has remained on systemic corticosteroid throughout her hospitalization.  No further antiviral necessary.     Acute hypoxic respiratory failure, likely multifactorial, with both COVID and bacterial ammonia contributing, with patient back on ventilator.  Patient was slowly improving but deteriorated over the last 24 hours.  She is requiring increasing ventilator support and pressor support.  The last 2 times that she deteriorated was when steroid was being weaned.  With her current steroid being weaned over the last week, I suspect her deterioration is once again secondary to steroid weaning.  Patient's steroid dose was increased.  Her respiratory status and ventilatory support is improving again, with her weaning again.  In addition, temperature is  now down.  Ventilator support and weaning per critical care medicine service.  Continue and maintain high-dose steroid for a more extended period of time.     CKD.  Creatinine worsened on Bactrim.  Patient is now off Bactrim.  Creatinine has normalized.  Monitor creatinine.     5. 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  Patient is status post exploratory laparotomy with ileocecectomy and end ileostomy creation.  Surgery follow-up.     B-cell lymphoma, on chemotherapy, which is currently postponed.  Patient was leukopenic on admission but resolved.  Monitor WBC/ANC.     Discussed with Dr. Alvarez and Remedios from critical care medicine service regarding patient's continued improvement with increasing steroid dose and plan for antibiotic discontinuation.  They are in agreement.     Antibiotics:  Levaquin # 10  Antibiotic # 19  Off remdesivir     Subjective:  Patient remains on ventilator in ICU but continues to improve.  Temperature stays down.  Ventilatory support decreased.  She is now only on low-dose vasopressor.  She remains intubated but responsive  She is tolerating antibiotic well.  No nausea, vomiting or diarrhea.    Objective:  Vitals:  Temp:  [95 °F (35 °C)-99.1 °F (37.3 °C)] 98.1 °F (36.7 °C)  HR:  [] 110  Resp:  [20-26] 24  BP: ()/(53-91) 105/57  SpO2:  [92 %-99 %] 96 %  Temp (24hrs), Av °F (36.7 °C), Min:95 °F (35 °C), Max:99.1 °F (37.3 °C)  Current: Temperature: 98.1 °F (36.7 °C)    Physical Exam:     General: Sedated on ventilator but opens eyes to verbal stimuli and attempt to communicate.  Comfortable.  Nontoxic   Neck:  Supple. No mass.  No lymphadenopathy.   Lungs: Expansion symmetric, mild diffuse rhonchi, no rales, no wheezing.   Heart:  Tachycardic with regular rhythm, S1 and S2 normal, no murmur.   Abdomen: Soft, nondistended, non-tender, bowel sounds active all four quadrants, no masses, no organomegaly.   Extremities: No edema. No erythema/warmth. No ulcer. Nontender to  palpation.   Skin:  No rash.   Neuro: Not assessable.     Invasive Devices       Central Venous Catheter Line  Duration             CVC Central Lines 02/20/24 7 days              Arterial Line  Duration             Arterial Line 02/14/24 Radial 14 days              Drain  Duration             Ileostomy RUQ 7 days    NG/OG/Enteral Tube Nasogastric 18 Fr Left nare 7 days    Urethral Catheter Latex 16 Fr. 7 days              Airway  Duration             ETT  Cuffed 7.5 mm 14 days                    Labs studies:   I have personally reviewed pertinent labs.  Results from last 7 days   Lab Units 02/28/24 0430 02/27/24  1939 02/27/24  0437 02/26/24  1446 02/26/24  0130 02/25/24  2301 02/25/24  2300   POTASSIUM mmol/L 3.0* 3.3* 3.5 5.3   < > 3.9  --    CHLORIDE mmol/L 111* 113* 115* 114*   < > 108  --    CO2 mmol/L 33* 32 33* 29   < > 39*  --    CO2, I-STAT mmol/L  --   --   --   --   --   --  38*   BUN mg/dL 31* 31* 32* 33*   < > 32*  --    CREATININE mg/dL 0.58* 0.61 0.61 0.64   < > 0.62  --    EGFR ml/min/1.73sq m 101 100 100 98   < > 99  --    GLUCOSE, ISTAT mg/dl  --   --   --   --   --   --  176*   CALCIUM mg/dL 10.5* 10.4* 10.1 9.5   < > 9.9  --    AST U/L  --   --   --  11*  --  10*  --    ALT U/L  --   --   --  16  --  17  --    ALK PHOS U/L  --   --   --  63  --  68  --     < > = values in this interval not displayed.     Results from last 7 days   Lab Units 02/28/24  0638 02/28/24  0430 02/27/24  1312 02/27/24  0946 02/27/24  0437 02/26/24  1446 02/25/24  2300 02/25/24  0419   WBC Thousand/uL  --  9.97  --   --  7.67 14.26*  --  8.19   HEMOGLOBIN g/dL 7.4* 6.9* 7.7*   < > 7.3* 10.0*  --  10.0*   I STAT HEMOGLOBIN   --   --   --   --   --   --    < >  --    PLATELETS Thousands/uL  --   --   --   --  104* 139*  --  103*    < > = values in this interval not displayed.     Results from last 7 days   Lab Units 02/26/24  1251 02/26/24  1144 02/21/24  1713 02/21/24  1710 02/21/24  1704   BLOOD CULTURE  No Growth at 24  hrs. No Growth at 24 hrs.  --   --   --    GRAM STAIN RESULT   --   --  2+ Polys*  1+ Yeast* Rare Polys  No bacteria seen Rare Polys  No bacteria seen       Imaging Studies:   I have personally reviewed pertinent imaging study reports and images in PACS.    EKG, Pathology, and Other Studies:   I have personally reviewed pertinent reports.

## 2024-02-28 NOTE — PROGRESS NOTES
Spiritual Care Progress Note    2024  Patient: Carla Hunt : 1965  Admission Date & Time: 1/10/2024 1941  MRN: [id number] [facility]: [id number]         attempted follow-up visit. Pt unavailable at this time.    Chaplains still remain available.       24 1500   Clinical Encounter Type   Visited With Patient not available

## 2024-02-28 NOTE — CASE MANAGEMENT
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    COVID 2024    Stage 3b chronic kidney disease (CKD) ([facility]) 2024    Abnormal CT of the head 2024    Nephrolithiasis 2021    Other specified anxiety disorders 2019    Reactive depression 2019    Hidradenitis suppurativa of left axilla 2019    Anxiety state 2018    Disorder of parathyroid gland ([facility]) 2018    Eczema 2018    Grand mal status ([facility]) 2018    Hypercalcemia 2018    Hypertensive disorder 2018    Open wound of hand except fingers 2018    Otitis externa 2018    Overweight 2018    Pain in face 2018    Skin sensation disturbance 2018    Subjective visual disturbance 2018    Long term current use of hormonal contraceptive 10/23/2018    Rosacea 2015      LOS (days): 49  Geometric Mean LOS (GMLOS) (days):   Days to GMLOS:     OBJECTIVE:  Risk of Unplanned Readmission Score: 25.72         Current admission status: Inpatient   Preferred Pharmacy:   [id number] [place] [facility] - [address]  [facility] [place] [address]  Phone: [phone] Fax: [phone]    [facility] - [place], [place] - [address]  [address] [facility] [address]  Phone: [phone] Fax:  [phone]    Primary Care Provider: Tony Guevara MD    Primary Insurance: [employer]  Secondary Insurance: MEDICARE    DISCHARGE DETAILS:        Additional Comments: Per chart review, pt continues to be intubated and sedated. CM to follow for discharge planning needs.

## 2024-02-28 NOTE — WOUND OSTOMY CARE
Progress Note - Wound   Carla Hunt 59 y.o. female MRN: [id number]  Unit/Bed#: MICU 10 Encounter: [id number]        Assessment:   Patient seen today for wound care follow up assessment. Patient is . Patient is dependent for all care, intubated and sedated in MICU, on critical care low air loss mattress. Patient is receiving parenteral nutrition. Patient is turned and repositioned with green foam wedges. Family at bedside updated on findings and plan for future ostomy care and teaching when patient is more medically stable.     HA stage III-  100% pink tissue, now partial thickness, scant sanguineous drainage as tissue is fragile.     B/L heels intact and blanching, preventative skin care orders placed.      No induration, fluctuance, odor, warmth/temperature differences, redness, or purulence noted to the above noted wounds and skin areas assessed. New dressings applied per orders listed below. Patient tolerated well- no s/s of non-verbal pain or discomfort observed during the encounter. Bedside nurse aware of plan of care. See flow sheets for more detailed assessment findings. Wound care will continue to follow.         Plan:   Cleanse b/l sacro-buttocks with soap and water, pat dry, and apply bordered silicone foam dressing to cover the wounds. Change every other day and as needed for soilage/dislodgement.      Cleanse b/l groin with soap and water, pat dry, and dust the skin lightly with nystatin powder per order. May use alginate rope to skin folds if skin is open or macerated. Change BID and PRN.    Wound 02/12/24 Pressure Injury Buttocks (Active)   Wound Image   02/28/24 1330   Wound Description Pink;Granulation tissue 02/28/24 1330   Pressure Injury Stage 3 02/28/24 1330   Sofie-wound Assessment Hyperpigmented 02/28/24 1330   Wound Length (cm) 2 cm 02/28/24 1330   Wound Width (cm) 1 cm 02/28/24 1330   Wound Depth (cm) 0.1 cm 02/28/24 1330   Wound Surface Area (cm^2) 2 cm^2 02/28/24 1330   Wound Volume  (cm^3) 0.2 cm^3 02/28/24 1330   Calculated Wound Volume (cm^3) 0.2 cm^3 02/28/24 1330   Change in Wound Size % -33.33 02/28/24 1330   Wound Site Closure WILL 02/28/24 1330   Drainage Amount Scant 02/28/24 1330   Drainage Description Serosanguineous 02/28/24 1330   Non-staged Wound Description Partial thickness 02/28/24 1330   Treatments Cleansed 02/28/24 1330   Dressing Foam, Silicon (eg. Allevyn, etc) 02/28/24 1330   Wound packed? No 02/28/24 1330   Packing- # removed 0 02/28/24 1330   Packing- # inserted 0 02/28/24 1330   Dressing Changed New 02/28/24 1330   Patient Tolerance Tolerated well 02/28/24 1330   Dressing Status Clean;Dry;Intact 02/28/24 1330       Molly Bustos BSN, RN, CWOCN

## 2024-02-28 NOTE — QUICK NOTE
2/28/2024 11:05 AM -  Carla Hunt's chart and case were reviewed by Lion Sorenson MD.  Mode of review included electronic chart check, and discussion with primary team.    Per review and discussion with primary team, primary had a discussion with the patient's family regarding continued optimization with vent weaning and steroid taper as able. Current plan is to continue optimization and revisit plan of care Thursday vs Friday      Palliative care will continue to follow and return on 2/29.         For urgent issues or any questions/concerns, please notify on-call provider via [employer].  You may also call our answering service 24/7 at [phone].    Lion Sorenson MD  Palliative and Supportive Care  Clinic/Answering Service: [phone]  You can find me on [employer]!

## 2024-02-28 NOTE — PROGRESS NOTES
[facility]  Progress Note: Critical Care  Name: Carla Hunt 59 y.o. 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  Anemia  Thrombocytopenia, suspected to be due to sepsis or possibly transfusion  Hypernatremia  B-cell lymphoma with history of treatment with Rituxan  Minimal SAH with no neurosurgical intervention indicated at the time  Seizure disorder s/p left temporal lobe partial resection  Steroid-induced hyperglycemia  Sacral ulcers bilaterally  Hypertriglyceridemia     Neuro:   Sedation: Precedex 0.6, off propofol, dilaudid 1.0  -Aim to maintain RASS -1, currently between RASS 0 to -1  -Repeat triglyceride level at 282 from 02/28  -Continue to aim to wean sedation for possibility of SBT/extubation     Diagnosis: seizure disorder  -S/p partial left temporal lobe resection in 2007  -On Lamictal 150 bid and Topamax 100 bid     Diagnosis: Anxiety  -On venlafaxine 25 mg TID via NGT  -Was on zyprexa 5 mg qHS, now dc'd  -Gabapentin 200 mg TID     CV:   Diagnosis: Distributive shock, systolics in the 90s-110s  -Levophed drip off  -On vasopressin 0.04 intermittently, currently off  -Maintain MAPs > 65 mmHg     Pulm:  Diagnosis: Acute hypoxic respiratory failure due to severe covid  and covid induced fibrosis  -Tested COVID positive on 1/7  -Completed severe COVID pathway and 7 days CTX  -Tenuous respiratory status requiring multiple re-admissions to MICU  -S/p Bronch 2/2  and 02/16  -NG on cultures (initially showed non-group A strep)  -PCP and AFB negative   -Legionella, viral, fungal cultures no growth to date  -Pulse dose steroids with solumedrol 1g daily x3 days (completed 2/7) then transitioned to prednisone 80mg daily on 2/8  -Off veletri  -Completed IVIG for 5 days  -CT scan 2/24: general improvement of areas of consolidation and some areas of groundglass opacification on the lungs, still with significant groundglass opacification especially in the lower lobes. Bilateral consolidations in lower lobes. No evidence of PE.  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 Her base free water deficit would be 32.0L if trying to get to 145 sodium.  -On free water flushes 350 mL every 4 hours     Diagnosis: Acute kidney injury, sCr at 0.99, resolved     F/E/N:   F: Off IVF  E: monitor and replete for goal K>4, P>3, Mg>2  N: On tube feeds with glucerna     Heme/Onc:   Diagnosis: Anemia  Likely multifactorial in nature, acute in the setting of recent sepsis/inflammatory state complicated by chronic anemia due to her other history of lymphoma and CKD  -Hgb at 6.9  this morning, with repeat at 7.4    Diagnosis: Thrombocytopenia, improving  -Will continue to monitor  -Could be in the setting of transfusions or sepsis. It may also be dilutional since she has been on IV fluids for past 24 hours  -Monitor sites for bleeding     Diagnosis: B cell lymphoma  -Follows with heme/onc at [facility]  -On rituxan for 2 year course, started May 2022  -Last dose was 12/20, treatment currently on hold   -Leukopenic on admission but WBC now wnl     DVT ppx with lovenox     Endo:   Diagnosis: steroid hyperglycemia and diabetes mellitus type 2, A1c at 6.6 from 01/2024  -Goal -180  -BG range last 24hrs 144-190  -On insulin drip     ID:   Diagnosis: Severe covid pneumonia and stenotrophomonas pneumonia  -Completed severe COVID pathway with decadron (ended 1/22) and remdesivir (ended 1/20), also completed 7 days CTX on 1/15  -C/f opportunistic infections given immunocompromised status on chemotherapy and recent steroid use  -No consolidations on CXR and procal negative  -S/p bactrim and minocycline x5 days for stenotrophomonas on sputum culture (1/25), then on bactrim for PJP ppx  -Bronc on 2/2 - Cx w/o growth (initially resulted as 1+ alpha hemolytic strep), AFB & PCP negative, f/u fungal, legionella, and viral cultures   -2/10 pt again had escalating O2 requirements on floors necessitating readmission to ICU  -Concern that with recent pulse dose steroids and now worsening respiratory status she could have infection, possibly opportunistic   -UA with moderate leukocytes, nitrite negative, 30-50 WBC, trace protein.  -Repeat sputum culture 2/9 with 4+ stenotrophomonas  -Bronch cultures with candida albicans, Rare gram positive cocci in pairs  -Previously completed 14 day course of antibiotics for stenotrophomonas pneumonia  -Completed 5 days of remdesevir  -Levaquin continued for an additional 7 days since bronchial cultures with pseudomonas MDR susceptible to levaquin. Day 3/7 today.      MSK/Skin:    -Wound care team following for bilateral sacral wounds    Disposition: Critical care    ICU Core Measures     Vented Patient  VAP Bundle  VAP bundle ordered     A: Assess, Prevent, and Manage Pain Has pain been assessed? Yes  Need for changes to pain regimen? No   B: Both Spontaneous Awakening Trials (SATs) and Spontaneous Breathing Trials (SBTs) Plan to perform spontaneous awakening trial today? Modified and patient remained on precedex   Plan to perform spontaneous breathing trial today? Modified and patient remained on precedex   Obvious barriers to extubation? No   C: Choice of Sedation RASS Goal: 0 Alert and Calm  Need for changes to sedation or analgesia regimen? Yes   D: Delirium CAM-ICU: Negative   E: Early Mobility  Plan for early mobility? Yes   F: Family Engagement Plan for family engagement today? Yes       Antibiotic Review: Patient on appropriate coverage based on culture data.     Review of Invasive Devices:    Ortiz Plan: Continue for accurate I/O monitoring for 48 hours  Central access plan: Medications requiring central line  Amelia Plan: Keep arterial line for frequent ABGs    Prophylaxis:  VTE VTE covered by:  enoxaparin, Subcutaneous, 40 mg at 02/27/24 0914       Stress Ulcer  covered byfamotidine (PEPCID) tablet 20 mg [[id number]]        Significant 24hr Events     24hr events: In terms of vent changes, she was maintained on PCMV RR 24, Peep 12and FiO2 weaned from 80% to 50%. She was getting tachcyardic and hypertensive and got prn versed 2mg once because of which she got slightly hypotensive to systolics of 90s and therefore started on vaso transiently.      Subjective   Patient seen by bedside, will discuss updates with patient's family today.    Review of Systems   Unable to perform ROS: Patient nonverbal        Objective                            Vitals I/O      Most Recent Min/Max in 24hrs   Temp 98.2 °F (36.8 °C) Temp  Min: 95 °F (35 °C)  Max: 99.1 °F (37.3 °C)   Pulse (!) 113 Pulse   Min: 60  Max: 128   Resp (!) 25 Resp  Min: 20  Max: 26   /91 BP  Min: 98/53  Max: 167/91   O2 Sat 98 % SpO2  Min: 92 %  Max: 99 %      Intake/Output Summary (Last 24 hours) at 2/28/2024 0716  Last data filed at 2/28/2024 0600  Gross per 24 hour   Intake 2756.38 ml   Output 2125 ml   Net 631.38 ml       Diet Enteral/Parenteral; Tube Feeding No Oral Diet; Glucerna 1.2; Continuous; 60; Prosource Protein Liquid - One Packet; 350; Water; Every 4 hours    Invasive Monitoring   Arterial Line  [place] /78  Arterial Line BP  Min: 88/80  Max: 186/78    mmHg  Arterial Line MAP (mmHg)  Min: 60 mmHg  Max: 113 mmHg           Physical Exam   Physical Exam  Vitals reviewed.   Skin:     General: Skin is warm.      Capillary Refill: Capillary refill takes less than 2 seconds.   HENT:      Head: Normocephalic.   Cardiovascular:      Rate and Rhythm: Regular rhythm. Tachycardia present.      Pulses: Normal pulses.   Abdominal: General: Bowel sounds are normal.      Palpations: Abdomen is soft.      Comments: Ostomy pouch noted with dark brownish-green stool, site CDI   Constitutional:       Appearance: She is ill-appearing.      Interventions: She is intubated.      Comments: Patient has a CBC, left-sided arterial line, Ortiz, ETT and NG tube.    Pulmonary:      Effort: Pulmonary effort is normal. She is intubated.      Comments: Mechanically ventilated breath sounds, slightly diminished lung sounds in lower lung fields.  Neurological:      Mental Status: She is easily aroused.      Comments: Patient opens eyes to verbal stimuli and physical stimuli, he is able to wiggle her toes on command and also able to nod when having conversation.   Genitourinary/Anorectal:  Ortiz present.          Diagnostic Studies      EKG: Reviewed  Imaging: Reviewed I have personally reviewed pertinent reports.       Medications:  Scheduled PRN   acetaminophen, 650 mg, Q6H  chlorhexidine, 15 mL, Q12H SARTHAK  enoxaparin, 40 mg, Q24H  SARTHAK  famotidine, 20 mg, BID  gabapentin, 200 mg, TID  ipratropium, 0.5 mg, TID  lamoTRIgine, 150 mg, BID  levalbuterol, 1.25 mg, TID  levofloxacin, 750 mg, Q24H  methylPREDNISolone sodium succinate, 125 mg, Q6H SARTHAK  metoclopramide, 5 mg, Q8H  nystatin, , BID  oxyCODONE, 5 mg, Q8H  polyethylene glycol, 17 g, Daily  potassium chloride, 40 mEq, Once   Followed by  potassium chloride, 40 mEq, Once  senna, 1 tablet, BID  topiramate, 100 mg, BID  venlafaxine, 25 mg, TID With Meals      HYDROmorphone, 0.5 mg, Q1H PRN  midazolam, 2 mg, Q4H PRN  ondansetron, 4 mg, Q6H PRN       Continuous    dexmedetomidine, 0.1-1.2 mcg/kg/hr, Last Rate: 0.4 mcg/kg/hr (02/27/24 1732)  HYDROmorphone, 1 mg/hr, Last Rate: 1 mg/hr (02/27/24 1105)  insulin regular (HumuLIN R,NovoLIN R) 1 Units/mL in sodium chloride 0.9 % 100 mL infusion, 0.3-21 Units/hr, Last Rate: 6 Units/hr (02/28/24 0214)  norepinephrine, 1-30 mcg/min, Last Rate: Stopped (02/27/24 0607)  propofol, 5-50 mcg/kg/min, Last Rate: Stopped (02/27/24 1033)  vasopressin, 0.04 Units/min, Last Rate: Stopped (02/28/24 0633)         Labs:    CBC    Recent Labs     02/26/24  1446 02/27/24  0437 02/27/24  0946 02/28/24  0430 02/28/24  0638   WBC 14.26* 7.67  --  9.97  --    HGB 10.0* 7.3*   < > 6.9* 7.4*   HCT 32.1* 24.5*   < > 23.0* 24.1*   * 104*  --   --   --    BANDSPCT 13*  --   --   --   --     < > = values in this interval not displayed.     BMP    Recent Labs     02/27/24  1939 02/28/24 0430   SODIUM 152* 150*   K 3.3* 3.0*   * 111*   CO2 32 33*   AGAP 7 6   BUN 31* 31*   CREATININE 0.61 0.58*   CALCIUM 10.4* 10.5*    Potassium repleted with 80 for now, will plan for additional repletion   Coags    No recent results     Additional Electrolytes  Recent Labs     02/27/24  0437   MG 2.0   PHOS 3.3          Blood Gas    Recent Labs     02/28/24  0430   PHART 7.416   MCK5FBF 51.1*   PO2ART 121.1   JOC2ASW 32.1*   BEART 6.8   SOURCE Line, Arterial     Recent Labs      02/26/24  0728 02/26/24  1202 02/28/24  0430   PHVEN 7.288*  --   --    UYH6ARK 71.2*  --   --    PO2VEN 46.5*  --   --    UYL1NUN 33.3*  --   --    BEVEN 4.8  --   --    S0HYBTS 77.3  --   --    SOURCE  --    < > Line, Arterial    < > = values in this interval not displayed.    LFTs  Recent Labs     02/26/24  1446   ALT 16   AST 11*   ALKPHOS 63   ALB 2.5*   TBILI 0.78       Infectious  No recent results  Glucose  Recent Labs     02/26/24  1446 02/27/24  0437 02/27/24  1939 02/28/24  0430   GLUC 219* 136 149* 147*               Miguel Whittaker MD

## 2024-02-29 ENCOUNTER — APPOINTMENT (INPATIENT)
Dept: RADIOLOGY | Facility: HOSPITAL | Age: 59
DRG: 003 | End: 2024-02-29
Payer: COMMERCIAL

## 2024-02-29 LAB
ABO GROUP BLD: NORMAL
ANION GAP SERPL CALCULATED.3IONS-SCNC: 7 MMOL/L
ANION GAP SERPL CALCULATED.3IONS-SCNC: 8 MMOL/L
ATRIAL RATE: 107 BPM
BASE EXCESS BLDA CALC-SCNC: 6.3 MMOL/L
BLD GP AB SCN SERPL QL: NEGATIVE
BUN SERPL-MCNC: 33 MG/DL (ref 5–25)
BUN SERPL-MCNC: 34 MG/DL (ref 5–25)
CALCIUM SERPL-MCNC: 10.6 MG/DL (ref 8.4–10.2)
CALCIUM SERPL-MCNC: 10.8 MG/DL (ref 8.4–10.2)
CHLORIDE SERPL-SCNC: 106 MMOL/L (ref 96–108)
CHLORIDE SERPL-SCNC: 109 MMOL/L (ref 96–108)
CO2 SERPL-SCNC: 33 MMOL/L (ref 21–32)
CO2 SERPL-SCNC: 34 MMOL/L (ref 21–32)
CREAT SERPL-MCNC: 0.58 MG/DL (ref 0.6–1.3)
CREAT SERPL-MCNC: 0.64 MG/DL (ref 0.6–1.3)
CRP SERPL QL: 63.1 MG/L
D DIMER PPP FEU-MCNC: 1.64 UG/ML FEU
ERYTHROCYTE [DISTWIDTH] IN BLOOD BY AUTOMATED COUNT: 19.9 % (ref 11.6–15.1)
ERYTHROCYTE [DISTWIDTH] IN BLOOD BY AUTOMATED COUNT: 20.4 % (ref 11.6–15.1)
GFR SERPL CREATININE-BSD FRML MDRD: 101 ML/MIN/1.73SQ M
GFR SERPL CREATININE-BSD FRML MDRD: 98 ML/MIN/1.73SQ M
GLUCOSE SERPL-MCNC: 108 MG/DL (ref 65–140)
GLUCOSE SERPL-MCNC: 129 MG/DL (ref 65–140)
GLUCOSE SERPL-MCNC: 131 MG/DL (ref 65–140)
GLUCOSE SERPL-MCNC: 136 MG/DL (ref 65–140)
GLUCOSE SERPL-MCNC: 139 MG/DL (ref 65–140)
GLUCOSE SERPL-MCNC: 149 MG/DL (ref 65–140)
GLUCOSE SERPL-MCNC: 166 MG/DL (ref 65–140)
GLUCOSE SERPL-MCNC: 166 MG/DL (ref 65–140)
GLUCOSE SERPL-MCNC: 170 MG/DL (ref 65–140)
GLUCOSE SERPL-MCNC: 174 MG/DL (ref 65–140)
GLUCOSE SERPL-MCNC: 183 MG/DL (ref 65–140)
GLUCOSE SERPL-MCNC: 185 MG/DL (ref 65–140)
GLUCOSE SERPL-MCNC: 189 MG/DL (ref 65–140)
GLUCOSE SERPL-MCNC: 200 MG/DL (ref 65–140)
HCO3 BLDA-SCNC: 30.7 MMOL/L (ref 22–28)
HCT VFR BLD AUTO: 21.3 % (ref 34.8–46.1)
HCT VFR BLD AUTO: 21.5 % (ref 34.8–46.1)
HCT VFR BLD AUTO: 24.2 % (ref 34.8–46.1)
HGB BLD-MCNC: 6.3 G/DL (ref 11.5–15.4)
HGB BLD-MCNC: 6.4 G/DL (ref 11.5–15.4)
HGB BLD-MCNC: 7.5 G/DL (ref 11.5–15.4)
MAGNESIUM SERPL-MCNC: 2 MG/DL (ref 1.9–2.7)
MCH RBC QN AUTO: 29.9 PG (ref 26.8–34.3)
MCH RBC QN AUTO: 30 PG (ref 26.8–34.3)
MCHC RBC AUTO-ENTMCNC: 29.8 G/DL (ref 31.4–37.4)
MCHC RBC AUTO-ENTMCNC: 31 G/DL (ref 31.4–37.4)
MCV RBC AUTO: 101 FL (ref 82–98)
MCV RBC AUTO: 97 FL (ref 82–98)
NRBC BLD AUTO-RTO: 2 /100 WBCS
O2 CT BLDA-SCNC: 7.6 ML/DL (ref 16–23)
OXYHGB MFR BLDA: 91.5 % (ref 94–97)
P AXIS: 71 DEGREES
PCO2 BLDA: 44.1 MM HG (ref 36–44)
PH BLDA: 7.46 [PH] (ref 7.35–7.45)
PHOSPHATE SERPL-MCNC: 1.8 MG/DL (ref 2.7–4.5)
PO2 BLDA: 67.8 MM HG (ref 75–129)
POTASSIUM SERPL-SCNC: 3.2 MMOL/L (ref 3.5–5.3)
POTASSIUM SERPL-SCNC: 4.6 MMOL/L (ref 3.5–5.3)
PR INTERVAL: 154 MS
QRS AXIS: 55 DEGREES
QRSD INTERVAL: 80 MS
QT INTERVAL: 306 MS
QTC INTERVAL: 408 MS
RBC # BLD AUTO: 2.14 MILLION/UL (ref 3.81–5.12)
RBC # BLD AUTO: 2.5 MILLION/UL (ref 3.81–5.12)
RH BLD: NEGATIVE
SODIUM SERPL-SCNC: 146 MMOL/L (ref 135–147)
SODIUM SERPL-SCNC: 151 MMOL/L (ref 135–147)
SPECIMEN EXPIRATION DATE: NORMAL
SPECIMEN SOURCE: ABNORMAL
T WAVE AXIS: 74 DEGREES
TRIGL SERPL-MCNC: 332 MG/DL
VENTRICULAR RATE: 107 BPM
WBC # BLD AUTO: 10.82 THOUSAND/UL (ref 4.31–10.16)
WBC # BLD AUTO: 11.64 THOUSAND/UL (ref 4.31–10.16)

## 2024-02-29 PROCEDURE — 86900 BLOOD TYPING SEROLOGIC ABO: CPT

## 2024-02-29 PROCEDURE — 99291 CRITICAL CARE FIRST HOUR: CPT | Performed by: INTERNAL MEDICINE

## 2024-02-29 PROCEDURE — 86901 BLOOD TYPING SEROLOGIC RH(D): CPT

## 2024-02-29 PROCEDURE — 93010 ELECTROCARDIOGRAM REPORT: CPT | Performed by: INTERNAL MEDICINE

## 2024-02-29 PROCEDURE — 80048 BASIC METABOLIC PNL TOTAL CA: CPT

## 2024-02-29 PROCEDURE — 94640 AIRWAY INHALATION TREATMENT: CPT

## 2024-02-29 PROCEDURE — 73030 X-RAY EXAM OF SHOULDER: CPT

## 2024-02-29 PROCEDURE — 85014 HEMATOCRIT: CPT | Performed by: STUDENT IN AN ORGANIZED HEALTH CARE EDUCATION/TRAINING PROGRAM

## 2024-02-29 PROCEDURE — 99233 SBSQ HOSP IP/OBS HIGH 50: CPT | Performed by: INTERNAL MEDICINE

## 2024-02-29 PROCEDURE — 82948 REAGENT STRIP/BLOOD GLUCOSE: CPT

## 2024-02-29 PROCEDURE — 85379 FIBRIN DEGRADATION QUANT: CPT

## 2024-02-29 PROCEDURE — 86850 RBC ANTIBODY SCREEN: CPT

## 2024-02-29 PROCEDURE — 85027 COMPLETE CBC AUTOMATED: CPT | Performed by: INTERNAL MEDICINE

## 2024-02-29 PROCEDURE — 85027 COMPLETE CBC AUTOMATED: CPT | Performed by: STUDENT IN AN ORGANIZED HEALTH CARE EDUCATION/TRAINING PROGRAM

## 2024-02-29 PROCEDURE — 86140 C-REACTIVE PROTEIN: CPT | Performed by: STUDENT IN AN ORGANIZED HEALTH CARE EDUCATION/TRAINING PROGRAM

## 2024-02-29 PROCEDURE — 84478 ASSAY OF TRIGLYCERIDES: CPT

## 2024-02-29 PROCEDURE — P9040 RBC LEUKOREDUCED IRRADIATED: HCPCS

## 2024-02-29 PROCEDURE — 82805 BLOOD GASES W/O2 SATURATION: CPT | Performed by: STUDENT IN AN ORGANIZED HEALTH CARE EDUCATION/TRAINING PROGRAM

## 2024-02-29 PROCEDURE — 94664 DEMO&/EVAL PT USE INHALER: CPT

## 2024-02-29 PROCEDURE — 94760 N-INVAS EAR/PLS OXIMETRY 1: CPT

## 2024-02-29 PROCEDURE — 93005 ELECTROCARDIOGRAM TRACING: CPT

## 2024-02-29 PROCEDURE — 86923 COMPATIBILITY TEST ELECTRIC: CPT

## 2024-02-29 PROCEDURE — 84100 ASSAY OF PHOSPHORUS: CPT | Performed by: INTERNAL MEDICINE

## 2024-02-29 PROCEDURE — 94003 VENT MGMT INPAT SUBQ DAY: CPT

## 2024-02-29 PROCEDURE — NC001 PR NO CHARGE: Performed by: INTERNAL MEDICINE

## 2024-02-29 PROCEDURE — 83735 ASSAY OF MAGNESIUM: CPT | Performed by: STUDENT IN AN ORGANIZED HEALTH CARE EDUCATION/TRAINING PROGRAM

## 2024-02-29 PROCEDURE — 85018 HEMOGLOBIN: CPT | Performed by: STUDENT IN AN ORGANIZED HEALTH CARE EDUCATION/TRAINING PROGRAM

## 2024-02-29 RX ORDER — POTASSIUM CHLORIDE 20MEQ/15ML
40 LIQUID (ML) ORAL ONCE
Status: COMPLETED | OUTPATIENT
Start: 2024-02-29 | End: 2024-02-29

## 2024-02-29 RX ORDER — OLANZAPINE 5 MG/1
5 TABLET ORAL 2 TIMES DAILY
Status: DISCONTINUED | OUTPATIENT
Start: 2024-02-29 | End: 2024-02-29

## 2024-02-29 RX ORDER — METHYLPREDNISOLONE SODIUM SUCCINATE 125 MG/2ML
125 INJECTION, POWDER, LYOPHILIZED, FOR SOLUTION INTRAMUSCULAR; INTRAVENOUS EVERY 12 HOURS SCHEDULED
Status: DISCONTINUED | OUTPATIENT
Start: 2024-02-29 | End: 2024-03-03

## 2024-02-29 RX ORDER — ALBUMIN, HUMAN INJ 5% 5 %
12.5 SOLUTION INTRAVENOUS ONCE
Status: COMPLETED | OUTPATIENT
Start: 2024-02-29 | End: 2024-02-29

## 2024-02-29 RX ORDER — POTASSIUM CHLORIDE 20 MEQ/1
40 TABLET, EXTENDED RELEASE ORAL ONCE
Status: DISCONTINUED | OUTPATIENT
Start: 2024-02-29 | End: 2024-02-29

## 2024-02-29 RX ORDER — POTASSIUM CHLORIDE 20MEQ/15ML
40 LIQUID (ML) ORAL ONCE
Status: DISCONTINUED | OUTPATIENT
Start: 2024-02-29 | End: 2024-02-29

## 2024-02-29 RX ORDER — POTASSIUM CHLORIDE 29.8 MG/ML
40 INJECTION INTRAVENOUS ONCE
Status: DISCONTINUED | OUTPATIENT
Start: 2024-02-29 | End: 2024-02-29

## 2024-02-29 RX ORDER — OLANZAPINE 10 MG/2ML
5 INJECTION, POWDER, FOR SOLUTION INTRAMUSCULAR
Status: DISCONTINUED | OUTPATIENT
Start: 2024-02-29 | End: 2024-02-29

## 2024-02-29 RX ORDER — METOCLOPRAMIDE HYDROCHLORIDE 5 MG/ML
5 INJECTION INTRAMUSCULAR; INTRAVENOUS 2 TIMES DAILY
Status: DISCONTINUED | OUTPATIENT
Start: 2024-02-29 | End: 2024-02-29

## 2024-02-29 RX ORDER — OLANZAPINE 5 MG/1
5 TABLET, ORALLY DISINTEGRATING ORAL EVERY 12 HOURS
Status: DISCONTINUED | OUTPATIENT
Start: 2024-02-29 | End: 2024-03-03

## 2024-02-29 RX ORDER — FUROSEMIDE 10 MG/ML
40 INJECTION INTRAMUSCULAR; INTRAVENOUS ONCE
Status: COMPLETED | OUTPATIENT
Start: 2024-02-29 | End: 2024-02-29

## 2024-02-29 RX ADMIN — TOPIRAMATE 100 MG: 100 TABLET, FILM COATED ORAL at 08:12

## 2024-02-29 RX ADMIN — ENOXAPARIN SODIUM 40 MG: 40 INJECTION SUBCUTANEOUS at 08:01

## 2024-02-29 RX ADMIN — LEVALBUTEROL HYDROCHLORIDE 1.25 MG: 1.25 SOLUTION RESPIRATORY (INHALATION) at 13:43

## 2024-02-29 RX ADMIN — GABAPENTIN 200 MG: 250 SOLUTION ORAL at 16:39

## 2024-02-29 RX ADMIN — VENLAFAXINE 25 MG: 25 TABLET ORAL at 07:51

## 2024-02-29 RX ADMIN — LEVALBUTEROL HYDROCHLORIDE 1.25 MG: 1.25 SOLUTION RESPIRATORY (INHALATION) at 08:08

## 2024-02-29 RX ADMIN — LAMOTRIGINE 150 MG: 100 TABLET ORAL at 18:19

## 2024-02-29 RX ADMIN — FAMOTIDINE 20 MG: 20 TABLET, FILM COATED ORAL at 08:01

## 2024-02-29 RX ADMIN — METOCLOPRAMIDE HYDROCHLORIDE 5 MG: 5 INJECTION INTRAMUSCULAR; INTRAVENOUS at 04:23

## 2024-02-29 RX ADMIN — IPRATROPIUM BROMIDE 0.5 MG: 0.5 SOLUTION RESPIRATORY (INHALATION) at 19:51

## 2024-02-29 RX ADMIN — FAMOTIDINE 20 MG: 20 TABLET, FILM COATED ORAL at 18:19

## 2024-02-29 RX ADMIN — ACETAMINOPHEN 650 MG: 325 TABLET, FILM COATED ORAL at 23:34

## 2024-02-29 RX ADMIN — POTASSIUM CHLORIDE 40 MEQ: 1.5 SOLUTION ORAL at 07:50

## 2024-02-29 RX ADMIN — CHLORHEXIDINE GLUCONATE 0.12% ORAL RINSE 15 ML: 1.2 LIQUID ORAL at 22:00

## 2024-02-29 RX ADMIN — LEVALBUTEROL HYDROCHLORIDE 1.25 MG: 1.25 SOLUTION RESPIRATORY (INHALATION) at 19:51

## 2024-02-29 RX ADMIN — DEXMEDETOMIDINE HYDROCHLORIDE 1 MCG/KG/HR: 4 INJECTION, SOLUTION INTRAVENOUS at 17:53

## 2024-02-29 RX ADMIN — VENLAFAXINE 25 MG: 25 TABLET ORAL at 12:01

## 2024-02-29 RX ADMIN — ACETAMINOPHEN 650 MG: 325 TABLET, FILM COATED ORAL at 10:09

## 2024-02-29 RX ADMIN — LAMOTRIGINE 150 MG: 100 TABLET ORAL at 08:12

## 2024-02-29 RX ADMIN — POTASSIUM CHLORIDE 40 MEQ: 1.5 SOLUTION ORAL at 10:17

## 2024-02-29 RX ADMIN — CHLORHEXIDINE GLUCONATE 0.12% ORAL RINSE 15 ML: 1.2 LIQUID ORAL at 08:01

## 2024-02-29 RX ADMIN — OXYCODONE HYDROCHLORIDE 5 MG: 5 SOLUTION ORAL at 15:34

## 2024-02-29 RX ADMIN — ACETAMINOPHEN 650 MG: 325 TABLET, FILM COATED ORAL at 16:39

## 2024-02-29 RX ADMIN — DEXMEDETOMIDINE HYDROCHLORIDE 1 MCG/KG/HR: 4 INJECTION, SOLUTION INTRAVENOUS at 06:02

## 2024-02-29 RX ADMIN — HYDROMORPHONE HYDROCHLORIDE 0.75 MG/HR: 10 INJECTION, SOLUTION INTRAMUSCULAR; INTRAVENOUS; SUBCUTANEOUS at 10:09

## 2024-02-29 RX ADMIN — HYDROMORPHONE HYDROCHLORIDE 0.5 MG: 1 INJECTION, SOLUTION INTRAMUSCULAR; INTRAVENOUS; SUBCUTANEOUS at 03:27

## 2024-02-29 RX ADMIN — OXYCODONE HYDROCHLORIDE 5 MG: 5 SOLUTION ORAL at 03:35

## 2024-02-29 RX ADMIN — MIDAZOLAM 2 MG: 1 INJECTION INTRAMUSCULAR; INTRAVENOUS at 12:27

## 2024-02-29 RX ADMIN — DEXMEDETOMIDINE HYDROCHLORIDE 1 MCG/KG/HR: 4 INJECTION, SOLUTION INTRAVENOUS at 23:12

## 2024-02-29 RX ADMIN — METHYLPREDNISOLONE SODIUM SUCCINATE 125 MG: 125 INJECTION, POWDER, FOR SOLUTION INTRAMUSCULAR; INTRAVENOUS at 05:03

## 2024-02-29 RX ADMIN — SENNOSIDES 8.6 MG: 8.6 TABLET, FILM COATED ORAL at 18:19

## 2024-02-29 RX ADMIN — GABAPENTIN 200 MG: 250 SOLUTION ORAL at 22:00

## 2024-02-29 RX ADMIN — IPRATROPIUM BROMIDE 0.5 MG: 0.5 SOLUTION RESPIRATORY (INHALATION) at 08:08

## 2024-02-29 RX ADMIN — SENNOSIDES 8.6 MG: 8.6 TABLET, FILM COATED ORAL at 08:01

## 2024-02-29 RX ADMIN — VENLAFAXINE 25 MG: 25 TABLET ORAL at 16:39

## 2024-02-29 RX ADMIN — MIDAZOLAM 2 MG: 1 INJECTION INTRAMUSCULAR; INTRAVENOUS at 08:10

## 2024-02-29 RX ADMIN — ACETAMINOPHEN 650 MG: 325 TABLET, FILM COATED ORAL at 04:23

## 2024-02-29 RX ADMIN — NYSTATIN: 100000 POWDER TOPICAL at 18:19

## 2024-02-29 RX ADMIN — HYDROMORPHONE HYDROCHLORIDE 0.5 MG: 1 INJECTION, SOLUTION INTRAMUSCULAR; INTRAVENOUS; SUBCUTANEOUS at 12:19

## 2024-02-29 RX ADMIN — DEXMEDETOMIDINE HYDROCHLORIDE 1 MCG/KG/HR: 4 INJECTION, SOLUTION INTRAVENOUS at 11:08

## 2024-02-29 RX ADMIN — METHYLPREDNISOLONE SODIUM SUCCINATE 125 MG: 125 INJECTION, POWDER, FOR SOLUTION INTRAMUSCULAR; INTRAVENOUS at 16:37

## 2024-02-29 RX ADMIN — OXYCODONE HYDROCHLORIDE 5 MG: 5 SOLUTION ORAL at 10:09

## 2024-02-29 RX ADMIN — OXYCODONE HYDROCHLORIDE 5 MG: 5 SOLUTION ORAL at 22:00

## 2024-02-29 RX ADMIN — DEXMEDETOMIDINE HYDROCHLORIDE 0.8 MCG/KG/HR: 4 INJECTION, SOLUTION INTRAVENOUS at 00:19

## 2024-02-29 RX ADMIN — ALBUMIN (HUMAN) 12.5 G: 12.5 INJECTION, SOLUTION INTRAVENOUS at 13:00

## 2024-02-29 RX ADMIN — POLYETHYLENE GLYCOL 3350 17 G: 17 POWDER, FOR SOLUTION ORAL at 08:01

## 2024-02-29 RX ADMIN — POTASSIUM PHOSPHATE, MONOBASIC POTASSIUM PHOSPHATE, DIBASIC 21 MMOL: 224; 236 INJECTION, SOLUTION, CONCENTRATE INTRAVENOUS at 15:38

## 2024-02-29 RX ADMIN — SODIUM CHLORIDE, SODIUM LACTATE, POTASSIUM CHLORIDE, AND CALCIUM CHLORIDE 500 ML: .6; .31; .03; .02 INJECTION, SOLUTION INTRAVENOUS at 04:18

## 2024-02-29 RX ADMIN — NYSTATIN: 100000 POWDER TOPICAL at 08:03

## 2024-02-29 RX ADMIN — TOPIRAMATE 100 MG: 100 TABLET, FILM COATED ORAL at 18:19

## 2024-02-29 RX ADMIN — FUROSEMIDE 40 MG: 10 INJECTION, SOLUTION INTRAMUSCULAR; INTRAVENOUS at 12:01

## 2024-02-29 RX ADMIN — IPRATROPIUM BROMIDE 0.5 MG: 0.5 SOLUTION RESPIRATORY (INHALATION) at 13:43

## 2024-02-29 RX ADMIN — GABAPENTIN 200 MG: 250 SOLUTION ORAL at 08:12

## 2024-02-29 RX ADMIN — OLANZAPINE 5 MG: 5 TABLET, ORALLY DISINTEGRATING ORAL at 13:19

## 2024-02-29 NOTE — QUICK NOTE
2/29/2024 3:34 PM -  Carla Hunt's chart and case were reviewed by Hannah Ramirez PA-C.  Mode of review included electronic chart check, and communicating with primary team.      Per review, patient making improvement in pulmonary status but not yet appropriate to wean to extubate. Per primary team, plan for ongoing optimization and full care. Visited to offer support to patient's spouse who was not at bedside and door/curtain closed for patient care. Palliative care remains available for support and/or GOC discussions if indicated.        For urgent issues or any questions/concerns, please notify on-call provider via [employer].  You may also call our answering service 24/7 at [phone].    Hannah Ramirez PA-C  Palliative and Supportive Care  Clinic/Answering Service: [phone]  You can find me on [employer]!

## 2024-02-29 NOTE — PROGRESS NOTES
Nutrition Follow-Up/Recommendations:    Recommend adding 1 additional packet of Prosource Protein Liquid daily to increase protein intake given surgical and pressure wounds, to provide a total of 1848 kcals, 116g (1.5 g/kg), 1279mL water in TF + water needed for Prosource administration.     Adjustments in additional free water flushes per critical care and/or surgery.

## 2024-02-29 NOTE — PROGRESS NOTES
[facility]  Progress Note: Critical Care  Name: Carla Hunt 59 y.o. 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  Anemia  Thrombocytopenia, suspected to be due to sepsis or possibly transfusion  Hypernatremia  B-cell lymphoma with history of treatment with Rituxan  Minimal SAH with no neurosurgical intervention indicated at the time  Seizure disorder s/p left temporal lobe partial resection  Steroid-induced hyperglycemia  Sacral ulcers bilaterally  Hypertriglyceridemia     Neuro:   Sedation: Precedex 1, off propofol, dilaudid 0.75  -Aim to maintain RASS -1, currently between RASS 0 to -1  -Repeat triglyceride level at 282 from 02/28  -Continue to aim to wean sedation for possibility of SBT/extubation     Diagnosis: seizure disorder  -S/p partial left temporal lobe resection in 2007  -On Lamictal 150 bid and Topamax 100 bid     Diagnosis: Anxiety  -On venlafaxine 25 mg TID via NGT  -Was on zyprexa 5 mg qHS, now dc'd  -Gabapentin 200 mg TID     CV:   Diagnosis: Distributive shock, systolics in the 90s-110s  -Levophed drip off  -On vasopressin 0.04 intermittently, currently off  -Maintain MAPs > 65 mmHg     Pulm:  Diagnosis: Acute hypoxic respiratory failure due to severe covid  and covid induced fibrosis  -Tested COVID positive on 1/7  -Completed severe COVID pathway and 7 days CTX  -Tenuous respiratory status requiring multiple re-admissions to MICU  -S/p Bronch 2/2 and  02/16  -NG on cultures (initially showed non-group A strep)  -PCP and AFB negative   -Legionella, viral, fungal cultures no growth to date  -Pulse dose steroids with solumedrol 1g daily x3 days (completed 2/7) then transitioned to prednisone 80mg daily on 2/8  -Off veletri  -Completed IVIG for 5 days  -CT scan 2/24: general improvement of areas of consolidation and some areas of groundglass opacification on the lungs, still with significant groundglass opacification especially in the lower lobes. Bilateral consolidations in lower lobes. No evidence of PE.  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 Her base free water deficit would be 32.0L if trying to get to 145 sodium.  -On free water flushes 350 mL every 4 hours     Diagnosis: Acute kidney injury, sCr at 0.99, resolved     F/E/N:   F: Off IVF  E: monitor and replete for goal K>4, P>3, Mg>2  N: On tube feeds with glucerna     Heme/Onc:   Diagnosis: Anemia  Likely multifactorial in nature, acute in the setting of recent sepsis/inflammatory state complicated by chronic anemia due to her other history of lymphoma and CKD  -Hgb at 6.9 this morning,  with repeat at 7.4     Diagnosis: Thrombocytopenia, improving  -Will continue to monitor  -Could be in the setting of transfusions or sepsis. It may also be dilutional since she has been on IV fluids for past 24 hours  -Monitor sites for bleeding     Diagnosis: B cell lymphoma  -Follows with heme/onc at [facility]  -On rituxan for 2 year course, started May 2022  -Last dose was 12/20, treatment currently on hold   -Leukopenic on admission but WBC now wnl     DVT ppx with lovenox     Endo:   Diagnosis: steroid hyperglycemia and diabetes mellitus type 2, A1c at 6.6 from 01/2024  -Goal -180  -BG range last 24hrs 144-190  -On insulin drip     ID:   Diagnosis: Severe covid pneumonia and stenotrophomonas pneumonia  -Completed severe COVID pathway with decadron (ended 1/22) and remdesivir (ended 1/20), also completed 7 days CTX on 1/15  -C/f opportunistic infections given immunocompromised status on chemotherapy and recent steroid use  -No consolidations on CXR and procal negative  -S/p bactrim and minocycline x5 days for stenotrophomonas on sputum culture (1/25), then on bactrim for PJP ppx  -Bronc on 2/2 - Cx w/o growth (initially resulted as 1+ alpha hemolytic strep), AFB & PCP negative, f/u fungal, legionella, and viral cultures   -2/10 pt again had escalating O2 requirements on floors necessitating readmission to ICU  -Concern that with recent pulse dose steroids and now worsening respiratory status she could have infection, possibly opportunistic   -UA with moderate leukocytes, nitrite negative, 30-50 WBC, trace protein.  -Repeat sputum culture 2/9 with 4+ stenotrophomonas  -Bronch cultures with candida albicans, Rare gram positive cocci in pairs  -Previously completed 14 day course of antibiotics for stenotrophomonas pneumonia  -Completed 5 days of remdesevir  -Levaquin continued for an additional 7 days since bronchial cultures with pseudomonas MDR susceptible to levaquin. Levaquin discontinued at this point since  patient has likely completed >7 days of therapy for pseudomonas susceptible to antibiotics.     MSK/Skin:   -Wound care team following for bilateral sacral wounds    Disposition: Critical care    ICU Core Measures     Vented Patient  VAP Bundle  VAP bundle ordered     A: Assess, Prevent, and Manage Pain Has pain been assessed? Yes  Need for changes to pain regimen? No   B: Both Spontaneous Awakening Trials (SATs) and Spontaneous Breathing Trials (SBTs) Plan to perform spontaneous awakening trial today? Yes   Plan to perform spontaneous breathing trial today? Yes   Obvious barriers to extubation? No   C: Choice of Sedation RASS Goal: 0 Alert and Calm  Need for changes to sedation or analgesia regimen? Yes   D: Delirium CAM-ICU: Negative   E: Early Mobility  Plan for early mobility? Yes   F: Family Engagement Plan for family engagement today? Yes       Review of Invasive Devices:    [place] Plan: Continue for accurate I/O monitoring for 48 hours  Central access plan: Medications requiring central line  [place] Plan: Keep arterial line for frequent ABGs    Prophylaxis:  VTE VTE covered by:  enoxaparin, Subcutaneous, 40 mg at 02/28/24 0826       Stress Ulcer  covered byfamotidine (PEPCID) tablet 20 mg [[id number]]        Significant 24hr Events     24hr events: Overnight, based on repeat ABG, patient weaned to PEEP 8, FiO2 40%. Was on RR24. Each time they tried to wean sedation, she got hypertensive and tachycardic, agitated. Was on 0.75 dilaudid, 1 precedex because of intermittent episodes of agitation. Because of concerns for shoulder dislocation, an XR was done.        Subjective   Patient seen by bedside, will reach out to patient's family to discuss medical updates and plan.    Review of Systems   Unable to perform ROS: Patient nonverbal        Objective                            Vitals I/O      Most Recent Min/Max in 24hrs   Temp 98.6 °F (37 °C) Temp  Min: 98.6 °F (37 °C)  Max: 100.4 °F (38 °C)   Pulse 91 Pulse  Min:  91  Max: 142   Resp (!) 24 Resp  Min: 23  Max: 25   /65 BP  Min: 92/53  Max: 162/80   O2 Sat 95 % SpO2  Min: 91 %  Max: 97 %   Vent settings: PCMV RR24, 14/8 PEEP, 40% FiO2    Drips: Precedex 1, Dilaudid 0.75, insulin drip   Intake/Output Summary (Last 24 hours) at 2/29/2024 0728  Last data filed at 2/29/2024 0600  Gross per 24 hour   Intake 3086.97 ml   Output 4640 ml   Net -1553.03 ml       Diet Enteral/Parenteral; Tube Feeding No Oral Diet; Glucerna 1.2; Continuous; 60; Prosource Protein Liquid - One Packet; 350; Water; Every 4 hours    Invasive Monitoring   Arterial Line  [place] /60  Arterial Line BP  Min: 87/45  Max: 175/73   MAP 86 mmHg  Arterial Line MAP (mmHg)  Min: 58 mmHg  Max: 106 mmHg           Physical Exam   Physical Exam  Vitals reviewed.   Skin:     General: Skin is warm.      Capillary Refill: Capillary refill takes less than 2 seconds.   HENT:      Head: Normocephalic.   Cardiovascular:      Rate and Rhythm: Regular rhythm. Tachycardia present.      Pulses: Normal pulses.   Abdominal: General: Bowel sounds are normal.      Palpations: Abdomen is soft.   Constitutional:       Appearance: She is ill-appearing.      Interventions: She is sedated and intubated.      Comments: Patient has a CBC, left-sided arterial line, ETT and NG tube noted.  She also has a Ortiz in place.   Pulmonary:      Effort: Pulmonary effort is normal. She is intubated.      Comments: Mechanically ventilated breath sounds  Neurological:      Comments: Patient is able to wake up with verbal stimuli and able to follow commands.     Genitourinary/Anorectal:  Ortiz present.          Diagnostic Studies      EKG: Reviewed  Imaging: Reviewed I have personally reviewed pertinent reports.       Medications:  Scheduled PRN   acetaminophen, 650 mg, Q6H  chlorhexidine, 15 mL, Q12H SARTHAK  enoxaparin, 40 mg, Q24H SARTHAK  famotidine, 20 mg, BID  gabapentin, 200 mg, TID  ipratropium, 0.5 mg, TID  lamoTRIgine, 150 mg, BID  levalbuterol,  1.25 mg, TID  methylPREDNISolone sodium succinate, 125 mg, Q8H SARTHAK  metoclopramide, 5 mg, Q8H  nystatin, , BID  oxyCODONE, 5 mg, Q6H  polyethylene glycol, 17 g, Daily  potassium chloride, 40 mEq, Once   Followed by  potassium chloride, 40 mEq, Once  senna, 1 tablet, BID  topiramate, 100 mg, BID  venlafaxine, 25 mg, TID With Meals      HYDROmorphone, 0.5 mg, Q1H PRN  midazolam, 2 mg, Q4H PRN  ondansetron, 4 mg, Q6H PRN       Continuous    dexmedetomidine, 0.1-1.2 mcg/kg/hr, Last Rate: 1 mcg/kg/hr (02/29/24 0602)  HYDROmorphone, 0.75 mg/hr, Last Rate: 0.75 mg/hr (02/28/24 1030)  insulin regular (HumuLIN R,NovoLIN R) 1 Units/mL in sodium chloride 0.9 % 100 mL infusion, 0.3-21 Units/hr, Last Rate: 1 Units/hr (02/29/24 0551)  vasopressin, 0.04 Units/min, Last Rate: Stopped (02/29/24 0534)         Labs:    CBC    Recent Labs     02/28/24  0430 02/28/24  0638 02/29/24  0429 02/29/24  0552   WBC 9.97  --  10.82*  --    HGB 6.9*   < > 6.4* 6.3*   HCT 23.0*   < > 21.5* 21.3*    < > = values in this interval not displayed.     BMP    Recent Labs     02/28/24  1818 02/29/24 0429   SODIUM 150* 151*   K 3.8 3.2*   * 109*   CO2 32 34*   AGAP 9 8   BUN 33* 34*   CREATININE 0.72 0.64   CALCIUM 10.7* 10.8*   Potassium repleted with 80 oral   Coags    No recent results     Additional Electrolytes  No recent results       Blood Gas    Recent Labs     02/28/24 2158   PHART 7.449   LHH5CAG 47.4*   PO2ART 81.5   FPD1BXL 32.1*   BEART 7.3   SOURCE Line, Arterial     Recent Labs     02/28/24 2158   SOURCE Line, Arterial    LFTs  No recent results    Infectious  No recent results  Glucose  Recent Labs     02/27/24  1939 02/28/24  0430 02/28/24  1818 02/29/24  0429   GLUC 149* 147* 153* 136               Miguel Whittaker MD

## 2024-02-29 NOTE — PLAN OF CARE
Problem: Prexisting or High Potential for Compromised Skin Integrity  Goal: Skin integrity is maintained or improved  Description: INTERVENTIONS:  - Identify patients at risk for skin breakdown  - Assess and monitor skin integrity  - Assess and monitor nutrition and hydration status  - Monitor labs   - Assess for incontinence   - Turn and reposition patient  - Assist with mobility/ambulation  - Relieve pressure over bony prominences  - Avoid friction and shearing  - Provide appropriate hygiene as needed including keeping skin clean and dry  - Evaluate need for skin moisturizer/barrier cream  - Collaborate with interdisciplinary team   - Patient/family teaching  - Consider wound care consult   Outcome: Progressing     Problem: Nutrition/Hydration-ADULT  Goal: Nutrient/Hydration intake appropriate for improving, restoring or maintaining nutritional needs  Description: Monitor and assess patient's nutrition/hydration status for malnutrition. Collaborate with interdisciplinary team and initiate plan and interventions as ordered.  Monitor patient's weight and dietary intake as ordered or per policy. Utilize nutrition screening tool and intervene as necessary. Determine patient's food preferences and provide high-protein, high-caloric foods as appropriate.     INTERVENTIONS:  - Monitor oral intake, urinary output, labs, and treatment plans  - Assess nutrition and hydration status and recommend course of action  - Evaluate amount of meals eaten  - Assist patient with eating if necessary   - Allow adequate time for meals  - Recommend/ encourage appropriate diets, oral nutritional supplements, and vitamin/mineral supplements  - Order, calculate, and assess calorie counts as needed  - Recommend, monitor, and adjust tube feedings and TPN/PPN based on assessed needs  - Assess need for intravenous fluids  - Provide specific nutrition/hydration education as appropriate  - Include patient/family/caregiver in decisions related to  nutrition  Outcome: Progressing     Problem: SAFETY,RESTRAINT: NV/NON-SELF DESTRUCTIVE BEHAVIOR  Goal: Remains free of harm/injury (restraint for non violent/non self-detsructive behavior)  Description: INTERVENTIONS:  - Instruct patient/family regarding restraint use   - Assess and monitor physiologic and psychological status   - Provide interventions and comfort measures to meet assessed patient needs   - Identify and implement measures to help patient regain control  - Assess readiness for release of restraint   Outcome: Progressing  Goal: Returns to optimal restraint-free functioning  Description: INTERVENTIONS:  - Assess the patient's behavior and symptoms that indicate continued need for restraint  - Identify and implement measures to help patient regain control  - Assess readiness for release of restraint   Outcome: Progressing     Problem: INFECTION - ADULT  Goal: Absence or prevention of progression during hospitalization  Description: INTERVENTIONS:  - Assess and monitor for signs and symptoms of infection  - Monitor lab/diagnostic results  - Monitor all insertion sites, i.e. indwelling lines, tubes, and drains  - Monitor endotracheal if appropriate and nasal secretions for changes in amount and color  - Hastings appropriate cooling/warming therapies per order  - Administer medications as ordered  - Instruct and encourage patient and family to use good hand hygiene technique  - Identify and instruct in appropriate isolation precautions for identified infection/condition  Outcome: Progressing     Problem: SAFETY ADULT  Goal: Patient will remain free of falls  Description: INTERVENTIONS:  - Educate patient/family on patient safety including physical limitations  - Instruct patient to call for assistance with activity   - Consult OT/PT to assist with strengthening/mobility   - Keep Call bell within reach  - Keep bed low and locked with side rails adjusted as appropriate  - Keep care items and personal  belongings within reach  - Initiate and maintain comfort rounds  - Make Fall Risk Sign visible to staff  - Offer Toileting every 2 Hours, in advance of need  - Initiate/Maintain bed alarm  - Obtain necessary fall risk management equipment: non skid footwear  - Apply yellow socks and bracelet for high fall risk patients  - Consider moving patient to room near nurses station  Outcome: Progressing     Problem: RESPIRATORY - ADULT  Goal: Achieves optimal ventilation and oxygenation  Description: INTERVENTIONS:  - Assess for changes in respiratory status  - Assess for changes in mentation and behavior  - Position to facilitate oxygenation and minimize respiratory effort  - Oxygen administered by appropriate delivery if ordered  - Initiate smoking cessation education as indicated  - Encourage broncho-pulmonary hygiene including cough, deep breathe, Incentive Spirometry  - Assess the need for suctioning and aspirate as needed  - Assess and instruct to report SOB or any respiratory difficulty  - Respiratory Therapy support as indicated  Outcome: Progressing       Problem: NEUROSENSORY - ADULT  Goal: Achieves stable or improved neurological status  Description: INTERVENTIONS  - Monitor and report changes in neurological status  - Monitor vital signs such as temperature, blood pressure, glucose, and any other labs ordered   - Initiate measures to prevent increased intracranial pressure  - Monitor for seizure activity and implement precautions if appropriate      Outcome: Progressing  Goal: Achieves maximal functionality and self care  Description: INTERVENTIONS  - Monitor swallowing and airway patency with patient fatigue and changes in neurological status  - Encourage and assist patient to increase activity and self care.   - Encourage visually impaired, hearing impaired and aphasic patients to use assistive/communication devices  Outcome: Progressing     Problem: CARDIOVASCULAR - ADULT  Goal: Maintains optimal cardiac  output and hemodynamic stability  Description: INTERVENTIONS:  - Monitor I/O, vital signs and rhythm  - Monitor for S/S and trends of decreased cardiac output  - Administer and titrate ordered vasoactive medications to optimize hemodynamic stability  - Assess quality of pulses, skin color and temperature  - Assess for signs of decreased coronary artery perfusion  - Instruct patient to report change in severity of symptoms  Outcome: Progressing  Goal: Absence of cardiac dysrhythmias or at baseline rhythm  Description: INTERVENTIONS:  - Continuous cardiac monitoring, vital signs, obtain 12 lead EKG if ordered  - Administer antiarrhythmic and heart rate control medications as ordered  - Monitor electrolytes and administer replacement therapy as ordered  Outcome: Progressing     Problem: GASTROINTESTINAL - ADULT  Goal: Minimal or absence of nausea and/or vomiting  Description: INTERVENTIONS:  - Administer IV fluids if ordered to ensure adequate hydration  - Maintain NPO status until nausea and vomiting are resolved  - Nasogastric tube if ordered  - Administer ordered antiemetic medications as needed  - Provide nonpharmacologic comfort measures as appropriate  - Advance diet as tolerated, if ordered  - Consider nutrition services referral to assist patient with adequate nutrition and appropriate food choices  Outcome: Progressing  Goal: Maintains or returns to baseline bowel function  Description: INTERVENTIONS:  - Assess bowel function  - Encourage oral fluids to ensure adequate hydration  - Administer IV fluids if ordered to ensure adequate hydration  - Administer ordered medications as needed  - Encourage mobilization and activity  - Consider nutritional services referral to assist patient with adequate nutrition and appropriate food choices  Outcome: Progressing  Goal: Maintains adequate nutritional intake  Description: INTERVENTIONS:  - Monitor percentage of each meal consumed  - Identify factors contributing to  decreased intake, treat as appropriate  - Assist with meals as needed  - Monitor I&O, weight, and lab values if indicated  - Obtain nutrition services referral as needed  Outcome: Progressing  Goal: Establish and maintain optimal ostomy function  Description: INTERVENTIONS:  - Assess bowel function  - Encourage oral fluids to ensure adequate hydration  - Administer IV fluids if ordered to ensure adequate hydration   - Administer ordered medications as needed  - Encourage mobilization and activity  - Nutrition services referral to assist patient with appropriate food choices  - Assess stoma site  - Consider wound care consult   Outcome: Progressing  Goal: Oral mucous membranes remain intact  Description: INTERVENTIONS  - Assess oral mucosa and hygiene practices  - Implement preventative oral hygiene regimen  - Implement oral medicated treatments as ordered  - Initiate Nutrition services referral as needed  Outcome: Progressing     Problem: GENITOURINARY - ADULT  Goal: Maintains or returns to baseline urinary function  Description: INTERVENTIONS:  - Assess urinary function  - Encourage oral fluids to ensure adequate hydration if ordered  - Administer IV fluids as ordered to ensure adequate hydration  - Administer ordered medications as needed  - Offer frequent toileting  - Follow urinary retention protocol if ordered  Outcome: Progressing  Goal: Urinary catheter remains patent  Description: INTERVENTIONS:  - Assess patency of urinary catheter  - If patient has a chronic marie, consider changing catheter if non-functioning  - Follow guidelines for intermittent irrigation of non-functioning urinary catheter  Outcome: Progressing     Problem: METABOLIC, FLUID AND ELECTROLYTES - ADULT  Goal: Electrolytes maintained within normal limits  Description: INTERVENTIONS:  - Monitor labs and assess patient for signs and symptoms of electrolyte imbalances  - Administer electrolyte replacement as ordered  - Monitor response to  electrolyte replacements, including repeat lab results as appropriate  - Instruct patient on fluid and nutrition as appropriate  Outcome: Progressing  Goal: Fluid balance maintained  Description: INTERVENTIONS:  - Monitor labs   - Monitor I/O and WT  - Instruct patient on fluid and nutrition as appropriate  - Assess for signs & symptoms of volume excess or deficit  Outcome: Progressing  Goal: Glucose maintained within target range  Description: INTERVENTIONS:  - Monitor Blood Glucose as ordered  - Assess for signs and symptoms of hyperglycemia and hypoglycemia  - Administer ordered medications to maintain glucose within target range  - Assess nutritional intake and initiate nutrition service referral as needed  Outcome: Progressing     Problem: HEMATOLOGIC - ADULT  Goal: Maintains hematologic stability  Description: INTERVENTIONS  - Assess for signs and symptoms of bleeding or hemorrhage  - Monitor labs  - Administer supportive blood products/factors as ordered and appropriate  Outcome: Progressing     Problem: MUSCULOSKELETAL - ADULT  Goal: Maintain or return mobility to safest level of function  Description: INTERVENTIONS:  - Assess patient's ability to carry out ADLs; assess patient's baseline for ADL function and identify physical deficits which impact ability to perform ADLs (bathing, care of mouth/teeth, toileting, grooming, dressing, etc.)  - Assess/evaluate cause of self-care deficits   - Assess range of motion  - Assess patient's mobility  - Assess patient's need for assistive devices and provide as appropriate  - Encourage maximum independence but intervene and supervise when necessary  - Involve family in performance of ADLs  - Assess for home care needs following discharge   - Consider OT consult to assist with ADL evaluation and planning for discharge  - Provide patient education as appropriate  Outcome: Progressing     Problem: COPING  Goal: Pt/Family able to verbalize concerns and demonstrate  effective coping strategies  Description: INTERVENTIONS:  - Assist patient/family to identify coping skills, available support systems and cultural and spiritual values  - Provide emotional support, including active listening and acknowledgement of concerns of patient and caregivers  - Reduce environmental stimuli, as able  - Provide patient education  - Assess for spiritual pain/suffering and initiate spiritual care, including notification of Pastoral Care or nat[place] based community as needed  - Assess effectiveness of coping strategies  Outcome: Progressing  Goal: Will report anxiety at manageable levels  Description: INTERVENTIONS:  - Administer medication as ordered  - Teach and encourage coping skills  - Provide emotional support  - Assess patient/family for anxiety and ability to cope  Outcome: Progressing     Problem: BEHAVIOR  Goal: Pt/Family maintain appropriate behavior and adhere to behavioral management agreement, if implemented  Description: INTERVENTIONS:  - Assess the family dynamic   - Encourage verbalization of thoughts and concerns in a socially appropriate manner  - Assess patient/family's coping skills and non-compliant behavior (including use of illegal substances).  - Utilize positive, consistent limit setting strategies supporting safety of patient, staff and others  - Initiate consult with Case Management, Spiritual Care or other ancillary services as appropriate  - If a patient's/visitor's behavior jeopardizes the safety of the patient, staff, or others, refer to organization procedure.   - Notify Security of behavior or suspected illegal substances which indicate the need for search of the patient and/or belongings  - Encourage participation in the decision making process about a behavioral management agreement; implement if patient meets criteria  Outcome: Progressing     Problem: Potential for Falls  Goal: Patient will remain free of falls  Description: INTERVENTIONS:  - Educate  patient/family on patient safety including physical limitations  - Instruct patient to call for assistance with activity   - Consult OT/PT to assist with strengthening/mobility   - Keep Call bell within reach  - Keep bed low and locked with side rails adjusted as appropriate  - Keep care items and personal belongings within reach  - Initiate and maintain comfort rounds  - Make Fall Risk Sign visible to staff  - Offer Toileting every 2 Hours, in advance of need  - Initiate/Maintain bed alarm  - Obtain necessary fall risk management equipment: non skid footwear  - Apply yellow socks and bracelet for high fall risk patients  - Consider moving patient to room near nurses station  Outcome: Progressing

## 2024-02-29 NOTE — PROGRESS NOTES
Critical Care Attending Note     59 years old with B-Cell lymphoma - completed rituximab in Dec 2023, seizure disorder, anxiety, CKD III, presented 1/7 for encephalopathy. Found to have COVID-19 infection.  Treated Remdesivir/Decadron/actemra, extensive neurologic workup negative except very small SAH (LP, imaging, vEEG).  She has had protracted course with repeated episodes of worsened hypoxic respiratory failure to include multiple bronchoscopies with treatment for possible drug induced or COVID pneumonitis and Stenotrophomonas.  She has previously clinically improved on steroid courses.  Returned ICU 2/1 - pulse dosed steroids 2/5-2/8 with further taper, required intubation 2/13 also with severe epistaxis.  IVIG course completed 2/15. Repeated Remdesivir course 2/20-2/25.  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       24hr events - remained intubated, continued to wean FiO2/PEEP overnight, trial of diuresis with good response,  weaned off, given 2 doses of prn dilaudid and 2 doses 2mg versed overnight.  Some agitation with turning noted this am.  Trial of PSV tolerated well this am but episode of hypertension and tachypnea with hypoxia during family visitation, returned to PCV.      VS AF, HR 's, MAPS 60-80's, SpO2 97% 0.4/6P, I/Os -1.5L  Exam  GEN middle aged woman in bed, intubated on precedex and dilaudid gtts, opens eyes verbal stim, transiently tracks, will not consistently follow commands  HEENT PERRL, MMM, no thrush, no scleral icterus  NECK no accessory muscle use, JVD not elevated, CVC w/o purulence   CV reg, single s1/2, no m/r  Pulm mechanical BS, no sig secretions, no wheeze or rales, no rhonchi, pltP 22, was getting Vt 600-650 on PSV 5/6cmH2O with RR 14-16  ABD +BS, colostomy in place brown stool, stables in place w/o purulence, soft ND, nonrigid   EXT warm, brisk cap refill 1+ dependent edema   marie in place clear yellow urine    Laboratory  and Diagnostics  Results from last 7 days   Lab Units 02/29/24  0552 02/29/24 0429 02/28/24  0638 02/28/24  0430 02/27/24  1312 02/27/24  0946 02/27/24 0437 02/26/24  1446 02/25/24  2300 02/25/24  0419 02/24/24  0413 02/23/24  0602   WBC Thousand/uL  --  10.82*  --  9.97  --   --  7.67 14.26*  --  8.19 10.30* 10.61*   HEMOGLOBIN g/dL 6.3* 6.4* 7.4* 6.9* 7.7* 7.1* 7.3* 10.0*  --  10.0* 10.0* 9.3*   I STAT HEMOGLOBIN   --   --   --   --   --   --   --   --    < >  --   --   --    HEMATOCRIT % 21.3* 21.5* 24.1* 23.0* 25.6*  --  24.5* 32.1*  --  31.3* 29.2* 29.1*   HEMATOCRIT, ISTAT   --   --   --   --   --   --   --   --    < >  --   --   --    PLATELETS Thousands/uL  --   --   --   --   --   --  104* 139*  --  103* 98*  --    BANDS PCT %  --   --   --   --   --   --   --  13*  --  8  --   --    MONO PCT %  --   --   --   --   --   --   --  1*  --  2*  --   --    EOS PCT %  --   --   --   --   --   --   --  0  --  3  --   --     < > = values in this interval not displayed.     Results from last 7 days   Lab Units 02/29/24 0429 02/28/24  1818 02/28/24 0430 02/27/24  1939 02/27/24 0437 02/26/24  1446 02/26/24  0130 02/25/24  2301   SODIUM mmol/L 151* 150* 150* 152* 153* 154* 153* 155*   POTASSIUM mmol/L 3.2* 3.8 3.0* 3.3* 3.5 5.3 3.3* 3.9   CHLORIDE mmol/L 109* 109* 111* 113* 115* 114* 111* 108   CO2 mmol/L 34* 32 33* 32 33* 29 33* 39*   ANION GAP mmol/L 8 9 6 7 5 11 9 8   BUN mg/dL 34* 33* 31* 31* 32* 33* 30* 32*   CREATININE mg/dL 0.64 0.72 0.58* 0.61 0.61 0.64 0.50* 0.62   CALCIUM mg/dL 10.8* 10.7* 10.5* 10.4* 10.1 9.5 9.3 9.9   GLUCOSE RANDOM mg/dL 136 153* 147* 149* 136 219* 135 170*   ALT U/L  --   --   --   --   --  16  --  17   AST U/L  --   --   --   --   --  11*  --  10*   ALK PHOS U/L  --   --   --   --   --  63  --  68   ALBUMIN g/dL  --   --   --   --   --  2.5*  --  2.4*   TOTAL BILIRUBIN mg/dL  --   --   --   --   --  0.78  --  0.70     Results from last 7 days   Lab Units 02/29/24  2133  02/27/24  0437 02/25/24  0419 02/24/24  0413 02/23/24  0602   MAGNESIUM mg/dL 2.0 2.0 1.7* 1.6* 1.9   PHOSPHORUS mg/dL 1.8* 3.3  --   --   --                Results from last 7 days   Lab Units 02/26/24  0130 02/25/24  2301 02/24/24  1245   LACTIC ACID mmol/L 2.0 3.1* 1.4     Results from last 7 days   Lab Units 02/29/24  0429 02/28/24  0430 02/27/24  0143 02/26/24  1446   CRP mg/L 63.1* 133.9* 291.3* 338.1*             Results from last 7 days   Lab Units 02/29/24  1157 02/26/24  1446 02/25/24  2301   D-DIMER QUANTITATIVE ug/ml FEU 1.64* 1.97* 1.58*           ABG:   Results from last 7 days   Lab Units 02/29/24  0826   PH ART  7.461*   PCO2 ART mm Hg 44.1*   PO2 ART mm Hg 67.8*   HCO3 ART mmol/L 30.7*   BASE EXC ART mmol/L 6.3   ABG SOURCE  Line, Arterial       -->233-->282-->332  lipase normal     MICRO  Bld CX 2/26 - NGTD  COVID PCR 2/21 - pending  Bronch BAL 2/21 4+ candida, few colonies MDR Pseudomonas  MRSA neg 2/18  BAL COVID (+) 2/16  PCP DFA neg 2/16  Sputum 2/9 - Stenotrophomonas      RADIOGRAPHS - images personally reviewed  CXR 2/28 - T/L good position, R>L alveolar infiltrates, no PTX, no dense consolidation     Upper Ext US 2/26 - neg for DVT, (+) superficial thrombophlebitis b/l     CT angio 2/26 - no PE, persistent bilateral ground glass infiltrates with some mild improvement from priori scans, bibasilar consolidations, no sig effusions, small ascites, no new intra-abdominal pathology     TTE 2/2024 - EF 70%, grade I diastolic dysfunction     Assessment  Acute hypoxic respiratory failure - D#16 intubation  Abnormal CT chest - concerning for likely persistent COVID pneumonitis in setting of rituximab dosing vs drug induced pneumonitis, less likely autoimmune pneumonitis given negative serology testing  Stenotrophomonas/Pseudomonas pneumonia - completed prior course, likely airway colonization, now on levofloxacin given clinical decline  Shock - mixed septic, hypovolemic  Acute cecal volvulus  post hemicolectomy and colostomy 2/20  Toxic/metabolic encephalopathy  Thrombocytopenia, anemia   B-Cell lymphoma  Minimal SAH POA  Seizure disorder  Steroid induced hyperglycemia  Elevated TG  Hypernatremia  Hypokalemia  CKD, resolved ELIESER  Upper ext thrombophlebitis   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     PLAN  NEURO - continue topamax and lamictal, Continue precedex, remain off propofol, wean off dilaudid gtt toady, continue scheduled oxycodone, prn versed if needed - attempt to overall minimize sedation needs, goal RASS -2 to 0, concern for delirium component as well, will give trial of olanzapine  CARDIAC -  Goal MAP >65, remains off vasopressors currently  PULM - POCUS w/o sig left effusion, returned to PCV, will continue for now and then reattempt PSV to allow for minimized sedation needs - mental status and weakness precludes SBT for extubation at this time, wean solumedrol 125mg q12hrs  GI - cont TFs to goal,  monitor ostomy output  RENAL - cont water 350cc q4hrs, trend BMP q12hrs, replete electrolytes, cont trial of diuresis now goal negative 500-1000cc as BP will permit  ID - completed second course remdesivir, completed course of levofloxacin, monitor off abx for now, CRP q48hrs  ENDO - continue insulin gtt  HEME - B cell lymphoma, no active treatments, transfuse 1unit PRBCs now, check repeat CBC in citrate tube after transfusion to see plt count given persistent clumping  ICU Care - continue SCDs/LMWH, CHG, HOB >3deg, continue pepcid  I again updated patient's  extensively at bedside, all questions answered  Given improved pulmonary status and noted likely limitation to SBT/Extubation trials being weakness and mental status, may consider tracheostomy placement in the next few days if these issues are not resolved to allow for extubation    My personal critical care time excluding procedures, teaching and updates is 43 minutes    Juvencio Kemp, DO

## 2024-02-29 NOTE — PROGRESS NOTES
Progress Note - Infectious Disease   Carla Hunt 59 y.o. female MRN: [id number]  [facility]: [id number]      Impression/Recommendations:  Possible bacterial pneumonia.  Initially, on admission early January, secondary to mostly COVID but there was concern for concurrent bacterial pneumonia on admission due to elevated procalcitonin. Patient completed treatment course for both COVID and bacterial pneumonia.  She was clinically improved with decreasing O2 requirement.  However, patient deteriorated in late January, requiring to be placed back on high flow O2 support.  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  Procalcitonin x 3 were in the normal range.  Patient has no fever or leukocytosis.  She improved with increasing steroid dose.  She completed a 5-day course of Bactrim/minocycline for presumptive stenotrophomonas respiratory infection, with expectorated sputum growing stenotrophomonas.  She subsequently had bronchoscopy, with BAL culture negative for bacterial growth, AFB and PCP but completed the course of Bactrim/minocycline prior to bronchoscopy.  Patient improved and O2 was being weaned again until 2 weeks ago, when she deteriorated again.  She is now back in ICU.  Expectorated sputum once again is growing stenotrophomonas.  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  Not sure that the stenotrophomonas that is growing again and expectorated sputum is pathogenic versus upper airway colonization.  Patient was restarted on high-dose steroid and Bactrim.  Patient's respiratory status remained tenuous throughout, required intubation subsequently.  She had bronchoscopy on 2/16 with moderate secretion.  BAL culture had no growth but COVID PCR was positive.  Bactrim was changed to Levaquin over weekend due to ELIESER.  Remdesivir was restarted for possible COVID relapse.  Due to worsening consolidations on CT,  patient is status post repeat bronchoscopy on 2/21, with some purulent secretion.  BAL culture had light growth of Pseudomonas and stenotrophomonas.  Given that patient's Pseudomonas isolate is susceptible to Levaquin and stenotrophomonas isolate is susceptible to both Bactrim and Levaquin and with patient having been on Levaquin for the last 10 days (and Bactrim for the prior 10 days), both Pseudomonas and stenotrophomonas isolates are most likely airway colonization.  Growth of Candida is most likely airway colonization also.  Repeat blood cultures have no growth.  Patient completed empiric antibiotic course.  Observe off further antibiotic.    Monitor temperature/WBC.  Monitor respiratory status.  Follow-up repeat blood cultures.     Severe COVID, present on admission.  Patient was treated with a 10-day course of remdesivir, dexamethasone and was given 1 dose of Tocilizumab on admission.  She also had a course of antibiotic initially for presumptive bacterial superinfection.  COVID antigen was negative prior to coming off isolation.  Due to positive COVID PCR in BAL, patient completed another 5-day course of remdesivir.  Patient has remained on systemic corticosteroid throughout her hospitalization.  No further antiviral necessary.     Acute hypoxic respiratory failure, likely multifactorial, with both COVID and bacterial ammonia contributing, with patient back on ventilator.  Patient was slowly improving but deteriorated over the last 24 hours.  She is requiring increasing ventilator support and pressor support.  The last 2 times that she deteriorated was when steroid was being weaned.  With her current steroid being weaned over the last week, I suspect her deterioration is once again secondary to steroid weaning.  Patient's steroid dose was increased.  Her respiratory status and ventilatory support is improving again, with her weaning again.  In addition, temperature is now down.  Ventilator support and weaning  per critical care medicine service.  Continue and maintain high-dose steroid for a more extended period of time.     CKD.  Creatinine worsened on Bactrim.  Patient is now off Bactrim.  Creatinine has normalized.  Monitor creatinine.     5. 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  Patient is status post exploratory laparotomy with ileocecectomy and end ileostomy creation.  Surgery follow-up.     B-cell lymphoma, on chemotherapy, which is currently postponed.  Patient was leukopenic on admission but resolved.  Monitor WBC/ANC.     Discussed with Dr. Whittaker from critical care medicine service regarding patient's continued improvement with increasing steroid dose and now off antibiotic.  He and critical care team will continue to try to wean patient off ventilator.     Antibiotics:  Off antibiotic     Subjective:  Patient remains on ventilator in ICU but continues to improve.  Temperature stays down.  Ventilatory support decreased.  She remains intubated but responsive  She is tolerating antibiotic well.  No nausea, vomiting or diarrhea.    Objective:  Vitals:  Temp:  [98.1 °F (36.7 °C)-100.4 °F (38 °C)] 98.1 °F (36.7 °C)  HR:  [] 99  Resp:  [11-26] 18  BP: ()/(53-80) 135/58  SpO2:  [88 %-97 %] 97 %  Temp (24hrs), Av.2 °F (37.3 °C), Min:98.1 °F (36.7 °C), Max:100.4 °F (38 °C)  Current: Temperature: 98.1 °F (36.7 °C)    Physical Exam:     General: Sedated on ventilator, opens eyes to verbal stimuli and attempts to communicate, comfortable, nontoxic.   Neck:  Supple. No mass.  No lymphadenopathy.   Lungs: Expansion symmetric, stable diffuse rhonchi, no rales, no wheezing.   Heart:  Regular rate and rhythm, S1 and S2 normal, no murmur.   Abdomen: Soft, nondistended, non-tender, bowel sounds active all four quadrants, no masses, no organomegaly.   Extremities: Stable mild mild leg edema. No erythema/warmth. No ulcer. Nontender to palpation.   Skin:  No rash.   Neuro: Not assessable.     Invasive  Devices       Central Venous Catheter Line  Duration             CVC Central Lines 02/20/24 8 days              Arterial Line  Duration             Arterial Line 02/14/24 Radial 15 days              Drain  Duration             Urethral Catheter Latex 16 Fr. 9 days    Ileostomy RUQ 8 days    NG/OG/Enteral Tube Nasogastric 18 Fr Left nare 8 days              Airway  Duration             ETT  Cuffed 7.5 mm 15 days                    Labs studies:   I have personally reviewed pertinent labs.  Results from last 7 days   Lab Units 02/29/24 0429 02/28/24  1818 02/28/24 0430 02/27/24 0437 02/26/24  1446 02/26/24  0130 02/25/24  2301 02/25/24  2300   POTASSIUM mmol/L 3.2* 3.8 3.0*   < > 5.3   < > 3.9  --    CHLORIDE mmol/L 109* 109* 111*   < > 114*   < > 108  --    CO2 mmol/L 34* 32 33*   < > 29   < > 39*  --    CO2, I-STAT mmol/L  --   --   --   --   --   --   --  38*   BUN mg/dL 34* 33* 31*   < > 33*   < > 32*  --    CREATININE mg/dL 0.64 0.72 0.58*   < > 0.64   < > 0.62  --    EGFR ml/min/1.73sq m 98 92 101   < > 98   < > 99  --    GLUCOSE, ISTAT mg/dl  --   --   --   --   --   --   --  176*   CALCIUM mg/dL 10.8* 10.7* 10.5*   < > 9.5   < > 9.9  --    AST U/L  --   --   --   --  11*  --  10*  --    ALT U/L  --   --   --   --  16  --  17  --    ALK PHOS U/L  --   --   --   --  63  --  68  --     < > = values in this interval not displayed.     Results from last 7 days   Lab Units 02/29/24  0552 02/29/24 0429 02/28/24  0638 02/28/24  0430 02/27/24  0946 02/27/24 0437 02/26/24  1446 02/25/24 2300 02/25/24 0419   WBC Thousand/uL  --  10.82*  --  9.97  --  7.67 14.26*  --  8.19   HEMOGLOBIN g/dL 6.3* 6.4* 7.4* 6.9*   < > 7.3* 10.0*  --  10.0*   I STAT HEMOGLOBIN   --   --   --   --   --   --   --    < >  --    PLATELETS Thousands/uL  --   --   --   --   --  104* 139*  --  103*    < > = values in this interval not displayed.     Results from last 7 days   Lab Units 02/26/24  1251 02/26/24  1144   BLOOD CULTURE  No Growth  at 48 hrs. No Growth at 48 hrs.       Imaging Studies:   I have personally reviewed pertinent imaging study reports and images in PACS.    EKG, Pathology, and Other Studies:   I have personally reviewed pertinent reports.

## 2024-03-01 LAB
ABO GROUP BLD BPU: NORMAL
ANION GAP SERPL CALCULATED.3IONS-SCNC: 4 MMOL/L
ANION GAP SERPL CALCULATED.3IONS-SCNC: 8 MMOL/L
ANISOCYTOSIS BLD QL SMEAR: PRESENT
ATRIAL RATE: 145 BPM
ATRIAL RATE: 65 BPM
BASE EXCESS BLDA CALC-SCNC: 5.1 MMOL/L
BASOPHILS # BLD MANUAL: 0 THOUSAND/UL (ref 0–0.1)
BASOPHILS NFR MAR MANUAL: 0 % (ref 0–1)
BPU ID: NORMAL
BUN SERPL-MCNC: 34 MG/DL (ref 5–25)
BUN SERPL-MCNC: 35 MG/DL (ref 5–25)
CALCIUM SERPL-MCNC: 10.5 MG/DL (ref 8.4–10.2)
CALCIUM SERPL-MCNC: 10.8 MG/DL (ref 8.4–10.2)
CHLORIDE SERPL-SCNC: 100 MMOL/L (ref 96–108)
CHLORIDE SERPL-SCNC: 105 MMOL/L (ref 96–108)
CO2 SERPL-SCNC: 33 MMOL/L (ref 21–32)
CO2 SERPL-SCNC: 35 MMOL/L (ref 21–32)
CREAT SERPL-MCNC: 0.5 MG/DL (ref 0.6–1.3)
CREAT SERPL-MCNC: 0.52 MG/DL (ref 0.6–1.3)
CROSSMATCH: NORMAL
EOSINOPHIL # BLD MANUAL: 0 THOUSAND/UL (ref 0–0.4)
EOSINOPHIL NFR BLD MANUAL: 0 % (ref 0–6)
ERYTHROCYTE [DISTWIDTH] IN BLOOD BY AUTOMATED COUNT: 20.8 % (ref 11.6–15.1)
GFR SERPL CREATININE-BSD FRML MDRD: 105 ML/MIN/1.73SQ M
GFR SERPL CREATININE-BSD FRML MDRD: 107 ML/MIN/1.73SQ M
GLUCOSE SERPL-MCNC: 102 MG/DL (ref 65–140)
GLUCOSE SERPL-MCNC: 120 MG/DL (ref 65–140)
GLUCOSE SERPL-MCNC: 121 MG/DL (ref 65–140)
GLUCOSE SERPL-MCNC: 123 MG/DL (ref 65–140)
GLUCOSE SERPL-MCNC: 123 MG/DL (ref 65–140)
GLUCOSE SERPL-MCNC: 126 MG/DL (ref 65–140)
GLUCOSE SERPL-MCNC: 133 MG/DL (ref 65–140)
GLUCOSE SERPL-MCNC: 142 MG/DL (ref 65–140)
GLUCOSE SERPL-MCNC: 148 MG/DL (ref 65–140)
GLUCOSE SERPL-MCNC: 149 MG/DL (ref 65–140)
GLUCOSE SERPL-MCNC: 150 MG/DL (ref 65–140)
GLUCOSE SERPL-MCNC: 150 MG/DL (ref 65–140)
GLUCOSE SERPL-MCNC: 158 MG/DL (ref 65–140)
GLUCOSE SERPL-MCNC: 169 MG/DL (ref 65–140)
GLUCOSE SERPL-MCNC: 98 MG/DL (ref 65–140)
HCO3 BLDA-SCNC: 29.3 MMOL/L (ref 22–28)
HCT VFR BLD AUTO: 25.5 % (ref 34.8–46.1)
HGB BLD-MCNC: 7.8 G/DL (ref 11.5–15.4)
HYPERCHROMIA BLD QL SMEAR: PRESENT
LYMPHOCYTES # BLD AUTO: 0.12 THOUSAND/UL (ref 0.6–4.47)
LYMPHOCYTES # BLD AUTO: 1 % (ref 14–44)
MAGNESIUM SERPL-MCNC: 2 MG/DL (ref 1.9–2.7)
MCH RBC QN AUTO: 29.7 PG (ref 26.8–34.3)
MCHC RBC AUTO-ENTMCNC: 30.6 G/DL (ref 31.4–37.4)
MCV RBC AUTO: 97 FL (ref 82–98)
MICROCYTES BLD QL AUTO: PRESENT
MONOCYTES # BLD AUTO: 0 THOUSAND/UL (ref 0–1.22)
MONOCYTES NFR BLD: 0 % (ref 4–12)
NEUTROPHILS # BLD MANUAL: 11.57 THOUSAND/UL (ref 1.85–7.62)
NEUTS BAND NFR BLD MANUAL: 3 % (ref 0–8)
NEUTS SEG NFR BLD AUTO: 96 % (ref 43–75)
NRBC BLD AUTO-RTO: 1 /100 WBC (ref 0–2)
O2 CT BLDA-SCNC: 11.1 ML/DL (ref 16–23)
OXYHGB MFR BLDA: 94 % (ref 94–97)
P AXIS: 65 DEGREES
P AXIS: 72 DEGREES
PCO2 BLDA: 41.4 MM HG (ref 36–44)
PH BLDA: 7.47 [PH] (ref 7.35–7.45)
PLATELET # BLD AUTO: 89 THOUSANDS/UL (ref 149–390)
PLATELET BLD QL SMEAR: ABNORMAL
PMV BLD AUTO: 12.5 FL (ref 8.9–12.7)
PO2 BLDA: 77.5 MM HG (ref 75–129)
POIKILOCYTOSIS BLD QL SMEAR: PRESENT
POTASSIUM SERPL-SCNC: 3.3 MMOL/L (ref 3.5–5.3)
POTASSIUM SERPL-SCNC: 4 MMOL/L (ref 3.5–5.3)
PR INTERVAL: 136 MS
PR INTERVAL: 150 MS
QRS AXIS: 28 DEGREES
QRS AXIS: 54 DEGREES
QRSD INTERVAL: 68 MS
QRSD INTERVAL: 82 MS
QT INTERVAL: 254 MS
QT INTERVAL: 374 MS
QTC INTERVAL: 388 MS
QTC INTERVAL: 394 MS
RBC # BLD AUTO: 2.63 MILLION/UL (ref 3.81–5.12)
RBC MORPH BLD: PRESENT
SODIUM SERPL-SCNC: 142 MMOL/L (ref 135–147)
SODIUM SERPL-SCNC: 143 MMOL/L (ref 135–147)
T WAVE AXIS: 59 DEGREES
T WAVE AXIS: 63 DEGREES
TRIGL SERPL-MCNC: 337 MG/DL
UNIT DISPENSE STATUS: NORMAL
UNIT PRODUCT CODE: NORMAL
UNIT PRODUCT VOLUME: 350 ML
UNIT RH: NORMAL
VENTRICULAR RATE: 145 BPM
VENTRICULAR RATE: 65 BPM
WBC # BLD AUTO: 11.69 THOUSAND/UL (ref 4.31–10.16)

## 2024-03-01 PROCEDURE — 84478 ASSAY OF TRIGLYCERIDES: CPT

## 2024-03-01 PROCEDURE — 94640 AIRWAY INHALATION TREATMENT: CPT

## 2024-03-01 PROCEDURE — 80048 BASIC METABOLIC PNL TOTAL CA: CPT | Performed by: STUDENT IN AN ORGANIZED HEALTH CARE EDUCATION/TRAINING PROGRAM

## 2024-03-01 PROCEDURE — 85027 COMPLETE CBC AUTOMATED: CPT | Performed by: STUDENT IN AN ORGANIZED HEALTH CARE EDUCATION/TRAINING PROGRAM

## 2024-03-01 PROCEDURE — 94003 VENT MGMT INPAT SUBQ DAY: CPT

## 2024-03-01 PROCEDURE — 83735 ASSAY OF MAGNESIUM: CPT | Performed by: STUDENT IN AN ORGANIZED HEALTH CARE EDUCATION/TRAINING PROGRAM

## 2024-03-01 PROCEDURE — NC001 PR NO CHARGE: Performed by: INTERNAL MEDICINE

## 2024-03-01 PROCEDURE — 94760 N-INVAS EAR/PLS OXIMETRY 1: CPT

## 2024-03-01 PROCEDURE — 82805 BLOOD GASES W/O2 SATURATION: CPT | Performed by: STUDENT IN AN ORGANIZED HEALTH CARE EDUCATION/TRAINING PROGRAM

## 2024-03-01 PROCEDURE — 80048 BASIC METABOLIC PNL TOTAL CA: CPT

## 2024-03-01 PROCEDURE — 82948 REAGENT STRIP/BLOOD GLUCOSE: CPT

## 2024-03-01 PROCEDURE — 99232 SBSQ HOSP IP/OBS MODERATE 35: CPT | Performed by: PHYSICIAN ASSISTANT

## 2024-03-01 PROCEDURE — 93005 ELECTROCARDIOGRAM TRACING: CPT

## 2024-03-01 PROCEDURE — 94761 N-INVAS EAR/PLS OXIMETRY MLT: CPT

## 2024-03-01 PROCEDURE — 99291 CRITICAL CARE FIRST HOUR: CPT | Performed by: INTERNAL MEDICINE

## 2024-03-01 PROCEDURE — 93010 ELECTROCARDIOGRAM REPORT: CPT | Performed by: INTERNAL MEDICINE

## 2024-03-01 PROCEDURE — 85007 BL SMEAR W/DIFF WBC COUNT: CPT | Performed by: STUDENT IN AN ORGANIZED HEALTH CARE EDUCATION/TRAINING PROGRAM

## 2024-03-01 PROCEDURE — 94002 VENT MGMT INPAT INIT DAY: CPT

## 2024-03-01 PROCEDURE — 99233 SBSQ HOSP IP/OBS HIGH 50: CPT | Performed by: INTERNAL MEDICINE

## 2024-03-01 RX ORDER — ALBUMIN, HUMAN INJ 5% 5 %
SOLUTION INTRAVENOUS
Status: COMPLETED
Start: 2024-03-01 | End: 2024-03-01

## 2024-03-01 RX ORDER — LABETALOL HYDROCHLORIDE 5 MG/ML
INJECTION, SOLUTION INTRAVENOUS
Status: COMPLETED
Start: 2024-03-01 | End: 2024-03-01

## 2024-03-01 RX ORDER — ECHINACEA PURPUREA EXTRACT 125 MG
2 TABLET ORAL EVERY 4 HOURS
Status: DISCONTINUED | OUTPATIENT
Start: 2024-03-01 | End: 2024-03-27

## 2024-03-01 RX ORDER — OXYCODONE HCL 5 MG/5 ML
2.5 SOLUTION, ORAL ORAL EVERY 4 HOURS PRN
Status: DISCONTINUED | OUTPATIENT
Start: 2024-03-01 | End: 2024-03-18

## 2024-03-01 RX ORDER — POTASSIUM CHLORIDE 20MEQ/15ML
40 LIQUID (ML) ORAL ONCE
Qty: 30 ML | Refills: 0 | Status: COMPLETED | OUTPATIENT
Start: 2024-03-01 | End: 2024-03-01

## 2024-03-01 RX ORDER — OXYCODONE HCL 5 MG/5 ML
5 SOLUTION, ORAL ORAL EVERY 4 HOURS PRN
Status: DISCONTINUED | OUTPATIENT
Start: 2024-03-01 | End: 2024-03-18

## 2024-03-01 RX ORDER — DEXMEDETOMIDINE HYDROCHLORIDE 4 UG/ML
.1-.7 INJECTION, SOLUTION INTRAVENOUS
Status: DISCONTINUED | OUTPATIENT
Start: 2024-03-01 | End: 2024-03-04

## 2024-03-01 RX ORDER — ACETAMINOPHEN 10 MG/ML
1000 INJECTION, SOLUTION INTRAVENOUS EVERY 6 HOURS PRN
Status: DISCONTINUED | OUTPATIENT
Start: 2024-03-01 | End: 2024-03-03

## 2024-03-01 RX ORDER — ALBUMIN, HUMAN INJ 5% 5 %
12.5 SOLUTION INTRAVENOUS ONCE
Status: COMPLETED | OUTPATIENT
Start: 2024-03-01 | End: 2024-03-01

## 2024-03-01 RX ORDER — FUROSEMIDE 10 MG/ML
40 INJECTION INTRAMUSCULAR; INTRAVENOUS ONCE
Status: COMPLETED | OUTPATIENT
Start: 2024-03-01 | End: 2024-03-01

## 2024-03-01 RX ORDER — ALBUMIN, HUMAN INJ 5% 5 %
12.5 SOLUTION INTRAVENOUS ONCE
Status: DISCONTINUED | OUTPATIENT
Start: 2024-03-01 | End: 2024-03-05

## 2024-03-01 RX ORDER — GABAPENTIN 250 MG/5ML
100 SOLUTION ORAL 3 TIMES DAILY
Status: DISCONTINUED | OUTPATIENT
Start: 2024-03-01 | End: 2024-03-04

## 2024-03-01 RX ORDER — SODIUM CHLORIDE/ALOE VERA
1 GEL (GRAM) NASAL
Status: DISCONTINUED | OUTPATIENT
Start: 2024-03-01 | End: 2024-04-18

## 2024-03-01 RX ORDER — POTASSIUM CHLORIDE 29.8 MG/ML
40 INJECTION INTRAVENOUS ONCE
Status: COMPLETED | OUTPATIENT
Start: 2024-03-01 | End: 2024-03-02

## 2024-03-01 RX ORDER — POTASSIUM CHLORIDE 20MEQ/15ML
40 LIQUID (ML) ORAL ONCE
Qty: 30 ML | Refills: 0 | Status: DISCONTINUED | OUTPATIENT
Start: 2024-03-01 | End: 2024-03-01

## 2024-03-01 RX ORDER — LABETALOL HYDROCHLORIDE 5 MG/ML
10 INJECTION, SOLUTION INTRAVENOUS ONCE
Status: COMPLETED | OUTPATIENT
Start: 2024-03-01 | End: 2024-03-01

## 2024-03-01 RX ORDER — HYDROMORPHONE HCL IN WATER/PF 6 MG/30 ML
0.2 PATIENT CONTROLLED ANALGESIA SYRINGE INTRAVENOUS EVERY 2 HOUR PRN
Status: DISCONTINUED | OUTPATIENT
Start: 2024-03-01 | End: 2024-03-04

## 2024-03-01 RX ADMIN — DEXMEDETOMIDINE HYDROCHLORIDE 0.1 MCG/KG/HR: 4 INJECTION, SOLUTION INTRAVENOUS at 20:56

## 2024-03-01 RX ADMIN — HYDROMORPHONE HYDROCHLORIDE 0.5 MG: 1 INJECTION, SOLUTION INTRAMUSCULAR; INTRAVENOUS; SUBCUTANEOUS at 03:57

## 2024-03-01 RX ADMIN — OXYCODONE HYDROCHLORIDE 2.5 MG: 5 SOLUTION ORAL at 18:24

## 2024-03-01 RX ADMIN — ACETAMINOPHEN 650 MG: 325 TABLET, FILM COATED ORAL at 17:25

## 2024-03-01 RX ADMIN — LAMOTRIGINE 150 MG: 100 TABLET ORAL at 08:05

## 2024-03-01 RX ADMIN — VENLAFAXINE 25 MG: 25 TABLET ORAL at 16:09

## 2024-03-01 RX ADMIN — LAMOTRIGINE 150 MG: 100 TABLET ORAL at 17:25

## 2024-03-01 RX ADMIN — IPRATROPIUM BROMIDE 0.5 MG: 0.5 SOLUTION RESPIRATORY (INHALATION) at 14:33

## 2024-03-01 RX ADMIN — VENLAFAXINE 25 MG: 25 TABLET ORAL at 08:29

## 2024-03-01 RX ADMIN — HYDROMORPHONE HYDROCHLORIDE 0.5 MG/HR: 10 INJECTION, SOLUTION INTRAMUSCULAR; INTRAVENOUS; SUBCUTANEOUS at 02:47

## 2024-03-01 RX ADMIN — ENOXAPARIN SODIUM 40 MG: 40 INJECTION SUBCUTANEOUS at 08:05

## 2024-03-01 RX ADMIN — LABETALOL HYDROCHLORIDE 10 MG: 5 INJECTION, SOLUTION INTRAVENOUS at 09:02

## 2024-03-01 RX ADMIN — LEVALBUTEROL HYDROCHLORIDE 1.25 MG: 1.25 SOLUTION RESPIRATORY (INHALATION) at 20:15

## 2024-03-01 RX ADMIN — GABAPENTIN 100 MG: 250 SOLUTION ORAL at 16:08

## 2024-03-01 RX ADMIN — ALBUMIN (HUMAN) 12.5 G: 12.5 INJECTION, SOLUTION INTRAVENOUS at 06:40

## 2024-03-01 RX ADMIN — ACETAMINOPHEN 650 MG: 325 TABLET, FILM COATED ORAL at 12:01

## 2024-03-01 RX ADMIN — NYSTATIN: 100000 POWDER TOPICAL at 08:35

## 2024-03-01 RX ADMIN — OLANZAPINE 5 MG: 5 TABLET, ORALLY DISINTEGRATING ORAL at 14:07

## 2024-03-01 RX ADMIN — CHLORHEXIDINE GLUCONATE 0.12% ORAL RINSE 15 ML: 1.2 LIQUID ORAL at 20:33

## 2024-03-01 RX ADMIN — FAMOTIDINE 20 MG: 20 TABLET, FILM COATED ORAL at 08:05

## 2024-03-01 RX ADMIN — IPRATROPIUM BROMIDE 0.5 MG: 0.5 SOLUTION RESPIRATORY (INHALATION) at 20:15

## 2024-03-01 RX ADMIN — HYDROMORPHONE HYDROCHLORIDE 0.2 MG: 0.2 INJECTION, SOLUTION INTRAMUSCULAR; INTRAVENOUS; SUBCUTANEOUS at 20:55

## 2024-03-01 RX ADMIN — CHLORHEXIDINE GLUCONATE 0.12% ORAL RINSE 15 ML: 1.2 LIQUID ORAL at 08:06

## 2024-03-01 RX ADMIN — OLANZAPINE 5 MG: 5 TABLET, ORALLY DISINTEGRATING ORAL at 02:28

## 2024-03-01 RX ADMIN — POLYETHYLENE GLYCOL 3350 17 G: 17 POWDER, FOR SOLUTION ORAL at 08:06

## 2024-03-01 RX ADMIN — TOPIRAMATE 100 MG: 100 TABLET, FILM COATED ORAL at 17:25

## 2024-03-01 RX ADMIN — METHYLPREDNISOLONE SODIUM SUCCINATE 125 MG: 125 INJECTION, POWDER, FOR SOLUTION INTRAMUSCULAR; INTRAVENOUS at 20:33

## 2024-03-01 RX ADMIN — ALBUMIN, HUMAN INJ 5% 12.5 G: 5 SOLUTION at 06:40

## 2024-03-01 RX ADMIN — Medication 2 SPRAY: at 18:24

## 2024-03-01 RX ADMIN — VENLAFAXINE 25 MG: 25 TABLET ORAL at 12:01

## 2024-03-01 RX ADMIN — POTASSIUM CHLORIDE 40 MEQ: 1.5 SOLUTION ORAL at 18:24

## 2024-03-01 RX ADMIN — SENNOSIDES 8.6 MG: 8.6 TABLET, FILM COATED ORAL at 17:25

## 2024-03-01 RX ADMIN — IPRATROPIUM BROMIDE 0.5 MG: 0.5 SOLUTION RESPIRATORY (INHALATION) at 07:48

## 2024-03-01 RX ADMIN — OXYCODONE HYDROCHLORIDE 5 MG: 5 SOLUTION ORAL at 03:57

## 2024-03-01 RX ADMIN — LEVALBUTEROL HYDROCHLORIDE 1.25 MG: 1.25 SOLUTION RESPIRATORY (INHALATION) at 14:33

## 2024-03-01 RX ADMIN — FUROSEMIDE 40 MG: 10 INJECTION, SOLUTION INTRAMUSCULAR; INTRAVENOUS at 10:04

## 2024-03-01 RX ADMIN — GABAPENTIN 200 MG: 250 SOLUTION ORAL at 08:05

## 2024-03-01 RX ADMIN — SENNOSIDES 8.6 MG: 8.6 TABLET, FILM COATED ORAL at 08:05

## 2024-03-01 RX ADMIN — TOPIRAMATE 100 MG: 100 TABLET, FILM COATED ORAL at 08:05

## 2024-03-01 RX ADMIN — NYSTATIN: 100000 POWDER TOPICAL at 17:25

## 2024-03-01 RX ADMIN — METHYLPREDNISOLONE SODIUM SUCCINATE 125 MG: 125 INJECTION, POWDER, FOR SOLUTION INTRAMUSCULAR; INTRAVENOUS at 08:06

## 2024-03-01 RX ADMIN — FAMOTIDINE 20 MG: 20 TABLET, FILM COATED ORAL at 17:25

## 2024-03-01 RX ADMIN — DEXMEDETOMIDINE HYDROCHLORIDE 1 MCG/KG/HR: 4 INJECTION, SOLUTION INTRAVENOUS at 05:34

## 2024-03-01 RX ADMIN — LEVALBUTEROL HYDROCHLORIDE 1.25 MG: 1.25 SOLUTION RESPIRATORY (INHALATION) at 07:48

## 2024-03-01 RX ADMIN — Medication 10 MG: at 09:02

## 2024-03-01 RX ADMIN — POTASSIUM CHLORIDE 40 MEQ: 29.8 INJECTION, SOLUTION INTRAVENOUS at 21:06

## 2024-03-01 RX ADMIN — ACETAMINOPHEN 650 MG: 325 TABLET, FILM COATED ORAL at 03:57

## 2024-03-01 NOTE — ASSESSMENT & PLAN NOTE
PSC, including MSW support, will follow closely to continue to provide supportive care as clinical situation evolves.   Encouraged patient/family to utilize letter boards for communication while verbal communication and writing may be taxing.  Decisional apparatus:  Patient is not competent on my exam today.  If competence is lost, patient's substitute decision maker would default to spouse by [place] Act 169.  Advance Directive / Living Will / POLST:  None on file, unable to complete  Will return on Monday 3/4 for continuity and support. Page sooner with questions or concerns.

## 2024-03-01 NOTE — PROGRESS NOTES
Critical Care Attending Note     59 years old with B-Cell lymphoma - completed rituximab in Dec 2023, seizure disorder, anxiety, CKD III, presented 1/7 for encephalopathy. Found to have COVID-19 infection.  Treated Remdesivir/Decadron/actemra, extensive neurologic workup negative except very small SAH (LP, imaging, vEEG).  She has had protracted course with repeated episodes of worsened hypoxic respiratory failure to include multiple bronchoscopies with treatment for possible drug induced or COVID pneumonitis and Stenotrophomonas.  She has previously clinically improved on steroid courses.  Returned ICU 2/1 - pulse dosed steroids 2/5-2/8 with further taper, required intubation 2/13 also with severe epistaxis.  IVIG course completed 2/15. Repeated Remdesivir course 2/20-2/25.  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       24hr events - was weaning well and tolerating PSV yesterday, became agitated with hypoxia and required return to PCV and increased FiO2/PEEP which was weaned through day.  This am, weaning sedation she was appropriate and following commands, placed on SBT with RSBI 90's after 30-45min, extubated to BiPAP support.      VS AF,HR 70-90's, MAPS 60-80's, SpO2 97% 0.4/6P, I/Os +2.2L  Exam  GEN middle aged woman, in bed, now with BiPAP in place, tracks, follows simple commands weak voice  HEENT AT, MMM, no scleral icterus   NECK no accessory muscle use CVC w/o purulence  CV reg, single s1/2, no m/r  Pulm now BiPAP BS, weaning BiPAP support 8/6cmH2O with Vt 550-600cc, no wheeze or rales  ABD +BS soft ND, non rigid, colostomy in place brown stool  EXT 1-2+ diffuse anasarca, warm, brisk cap refill   marie in place, yellow urine    Laboratory and Diagnostics  Results from last 7 days   Lab Units 03/01/24  0538 02/29/24  1541 02/29/24  0552 02/29/24  0429 02/28/24  0638 02/28/24  0430 02/27/24  1312 02/27/24  0946 02/27/24  0437 02/26/24  1446 02/25/24  1978  02/25/24 0419 02/24/24 0413   WBC Thousand/uL 11.69* 11.64*  --  10.82*  --  9.97  --   --  7.67 14.26*  --  8.19 10.30*   HEMOGLOBIN g/dL 7.8* 7.5* 6.3* 6.4* 7.4* 6.9* 7.7*   < > 7.3* 10.0*  --  10.0* 10.0*   I STAT HEMOGLOBIN   --   --   --   --   --   --   --   --   --   --    < >  --   --    HEMATOCRIT % 25.5* 24.2* 21.3* 21.5* 24.1* 23.0* 25.6*  --  24.5* 32.1*  --  31.3* 29.2*   HEMATOCRIT, ISTAT   --   --   --   --   --   --   --   --   --   --    < >  --   --    PLATELETS Thousands/uL 89*  --   --   --   --   --   --   --  104* 139*  --  103* 98*   BANDS PCT % 3  --   --   --   --   --   --   --   --  13*  --  8  --    MONO PCT % 0*  --   --   --   --   --   --   --   --  1*  --  2*  --    EOS PCT % 0  --   --   --   --   --   --   --   --  0  --  3  --     < > = values in this interval not displayed.     Results from last 7 days   Lab Units 03/01/24  0538 02/29/24  1753 02/29/24  0429 02/28/24  1818 02/28/24  0430 02/27/24  1939 02/27/24  0437 02/26/24  1446 02/26/24  0130 02/25/24  2301   SODIUM mmol/L 142 146 151* 150* 150* 152* 153* 154*   < > 155*   POTASSIUM mmol/L 4.0 4.6 3.2* 3.8 3.0* 3.3* 3.5 5.3   < > 3.9   CHLORIDE mmol/L 105 106 109* 109* 111* 113* 115* 114*   < > 108   CO2 mmol/L 33* 33* 34* 32 33* 32 33* 29   < > 39*   ANION GAP mmol/L 4 7 8 9 6 7 5 11   < > 8   BUN mg/dL 34* 33* 34* 33* 31* 31* 32* 33*   < > 32*   CREATININE mg/dL 0.52* 0.58* 0.64 0.72 0.58* 0.61 0.61 0.64   < > 0.62   CALCIUM mg/dL 10.5* 10.6* 10.8* 10.7* 10.5* 10.4* 10.1 9.5   < > 9.9   GLUCOSE RANDOM mg/dL 120 166* 136 153* 147* 149* 136 219*   < > 170*   ALT U/L  --   --   --   --   --   --   --  16  --  17   AST U/L  --   --   --   --   --   --   --  11*  --  10*   ALK PHOS U/L  --   --   --   --   --   --   --  63  --  68   ALBUMIN g/dL  --   --   --   --   --   --   --  2.5*  --  2.4*   TOTAL BILIRUBIN mg/dL  --   --   --   --   --   --   --  0.78  --  0.70    < > = values in this interval not displayed.     Results  from last 7 days   Lab Units 02/29/24  0429 02/27/24  0437 02/25/24  0419 02/24/24  0413   MAGNESIUM mg/dL 2.0 2.0 1.7* 1.6*   PHOSPHORUS mg/dL 1.8* 3.3  --   --                Results from last 7 days   Lab Units 02/26/24  0130 02/25/24  2301 02/24/24  1245   LACTIC ACID mmol/L 2.0 3.1* 1.4     Results from last 7 days   Lab Units 02/29/24  0429 02/28/24  0430 02/27/24  0143 02/26/24  1446   CRP mg/L 63.1* 133.9* 291.3* 338.1*             Results from last 7 days   Lab Units 02/29/24  1157 02/26/24  1446 02/25/24  2301   D-DIMER QUANTITATIVE ug/ml FEU 1.64* 1.97* 1.58*           ABG:   Results from last 7 days   Lab Units 02/29/24  0826   PH ART  7.461*   PCO2 ART mm Hg 44.1*   PO2 ART mm Hg 67.8*   HCO3 ART mmol/L 30.7*   BASE EXC ART mmol/L 6.3   ABG SOURCE  Line, Arterial       -->233-->282-->332  lipase normal     MICRO  Bld CX 2/26 - NGTD  COVID PCR 2/21 - pending  Bronch BAL 2/21 4+ candida, few colonies MDR Pseudomonas  MRSA neg 2/18  BAL COVID (+) 2/16  PCP DFA neg 2/16  Sputum 2/9 - Stenotrophomonas      RADIOGRAPHS - images personally reviewed  CXR 2/28 - T/L good position, R>L alveolar infiltrates, no PTX, no dense consolidation     Upper Ext US 2/26 - neg for DVT, (+) superficial thrombophlebitis b/l     CT angio 2/26 - no PE, persistent bilateral ground glass infiltrates with some mild improvement from priori scans, bibasilar consolidations, no sig effusions, small ascites, no new intra-abdominal pathology     TTE 2/2024 - EF 70%, grade I diastolic dysfunction     Assessment  Acute hypoxic respiratory failure - D#17 intubation - extubated 3/1  Abnormal CT chest - concerning for likely persistent COVID pneumonitis in setting of rituximab dosing vs drug induced pneumonitis, less likely autoimmune pneumonitis given negative serology testing  Stenotrophomonas/Pseudomonas pneumonia - completed prior course, likely airway colonization, now on levofloxacin given clinical decline  Shock - mixed septic,  hypovolemic - resolved  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  Toxic/metabolic encephalopathy with likely delirium component  Thrombocytopenia, anemia   B-Cell lymphoma  Minimal SAH POA  Seizure disorder  Steroid induced hyperglycemia  Elevated TG  Hypernatremia - resolved  Hypokalemia  CKD, resolved ELIESER  Upper ext thrombophlebitis   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     PLAN  NEURO - continue topamax and lamictal, weaned off precedex, can resume if needed, continue olanzapine, sleep wake cycle  CARDIAC -  Goal MAP >65, remains off vasopressors currently  PULM - Continue BiPAP with weaning to HFNC (low FiO2 and higher flow as tolerated), close monitoring for respiratory fatigue, continue solumedrol 125mg q12hrs  GI - Holding TFs today, hopeful to resume tomorrow of respiratory status is stable, will need formal speech evaluation once off BiPAP - continue NGT for now, monitor ostomy output  RENAL - holding free water for now, trend NA, replete electrolytes, cont trial of diuresis now goal negative 500-1000cc as BP will permit  ID - completed second course remdesivir, completed course of levofloxacin, monitor off abx for now, CRP q48hrs  ENDO - continue insulin gtt  HEME - B cell lymphoma, no active treatments, trend Hgb and plts  ICU Care - continue SCDs/LMWH, CHG, HOB >3deg, continue pepcid  I again updated patient's  and daughter extensively at bedside, all questions answered  Given overall clinical improvements, confirmed LEVEL I code status should she demonstrate respiratory fatigue and need reintubation - would consider trach placement should this occur    My personal critical care time excluding procedures, teaching and updates is 58 minutes    Juvencio Kemp,

## 2024-03-01 NOTE — PROGRESS NOTES
[facility]  Progress Note: Critical Care  Name: Carla Hunt 59 y.o. 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  Anemia  Thrombocytopenia, suspected to be due to sepsis or possibly transfusion  B-cell lymphoma with history of treatment with Rituxan  Minimal SAH with no neurosurgical intervention indicated at the time  Seizure disorder s/p left temporal lobe partial resection  Steroid-induced hyperglycemia  Sacral ulcers bilaterally  Hypertriglyceridemia     Neuro:   Sedation: On Precedex 1.2 at this time  -Aim to maintain RASS 0 to -1, currently between RASS 0 to -1  -Continue to aim to wean sedation for possibility of SBT/extubation    Diagnosis: Analgesia  -On oxycodone 5 mg scheduled every 6 hours     Diagnosis: seizure disorder  -S/p partial left temporal lobe resection in 2007  -On Lamictal 150 bid and Topamax 100 bid     Diagnosis: Anxiety  -On venlafaxine 25 mg TID via NGT  -Currently on Zyprexa 5 mg ODT every 12 hours  -Gabapentin 200 mg TID     CV:   Diagnosis: Distributive shock, systolics in the 90s-110s  -Levophed drip off  -On vasopressin 0.04 intermittently, currently off  -Maintain MAPs > 65 mmHg     Pulm:  Diagnosis: Acute hypoxic respiratory failure due to severe covid  and covid induced fibrosis  -Tested COVID positive on 1/7  -Completed severe COVID pathway and 7 days CTX  -Tenuous respiratory status requiring  multiple re-admissions to MICU  -S/p Bronch 2/2 and 02/16  -NG on cultures (initially showed non-group A strep)  -PCP and AFB negative   -Legionella, viral, fungal cultures no growth to date  -Pulse dose steroids with solumedrol 1g daily x3 days (completed 2/7) then transitioned to prednisone 80mg daily on 2/8  -Off veletri  -Completed IVIG for 5 days  -CT scan 2/24: general improvement of areas of consolidation and some areas of groundglass opacification on the lungs, still with significant groundglass opacification especially in the lower lobes. Bilateral consolidations in lower lobes. No evidence of PE.  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) though unclear how this was diagnosed, this also does not enirely fit as she is NOT hypokalemic  -Continue to monitor I/O and UOP     Diagnosis: Hypernatremia, resolved  -Can consider reducing free water flushes to 250 mL every 4 hours     Diagnosis: Acute kidney injury, sCr at 0.99, resolved     F/E/N:   F: Off IVF  E: monitor and replete for goal K>4, P>3, Mg>2  N: On tube feeds with glucerna with free water flushes 350 mL every 2 hours     Heme/Onc:   Diagnosis: Anemia  Likely multifactorial in nature, acute in the setting of recent sepsis/inflammatory state complicated by chronic anemia due to her other history of lymphoma and CKD  -Hgb at 7.8 this morning  -She did receive 1 unit transfusion yesterday     Diagnosis: Thrombocytopenia,  improving  -Will continue to monitor  -Could be in the setting of transfusions or sepsis. It may also be dilutional since she has been on IV fluids for past 24 hours  -Monitor sites for bleeding  -Platelet levels at 89 this morning     Diagnosis: B cell lymphoma  -Follows with heme/onc at [facility]  -On rituxan for 2 year course, started May 2022  -Last dose was 12/20, treatment currently on hold   -Leukopenic on admission but WBC now wnl     DVT ppx with lovenox     Endo:   Diagnosis: steroid hyperglycemia and diabetes mellitus type 2, A1c at 6.6 from 01/2024  -Goal -180  -BG range last 24hrs 120-169  -On insulin drip     ID:   Diagnosis: Severe covid pneumonia and stenotrophomonas pneumonia  -Completed severe COVID pathway with decadron (ended 1/22) and remdesivir (ended 1/20), also completed 7 days CTX on 1/15  -C/f opportunistic infections given immunocompromised status on chemotherapy and recent steroid use  -No consolidations on CXR and procal negative  -S/p bactrim and minocycline x5 days for stenotrophomonas on sputum culture (1/25), then on bactrim for PJP ppx  -Bronc on 2/2 - Cx w/o growth (initially resulted as 1+ alpha hemolytic strep), AFB & PCP negative, f/u fungal, legionella, and viral cultures   -2/10 pt again had escalating O2 requirements on floors necessitating readmission to ICU  -Concern that with recent pulse dose steroids and now worsening respiratory status she could have infection, possibly opportunistic   -UA with moderate leukocytes, nitrite negative, 30-50 WBC, trace protein.  -Repeat sputum culture 2/9 with 4+ stenotrophomonas  -Bronch cultures with candida albicans, Rare gram positive cocci in pairs  -Previously completed 14 day course of antibiotics for stenotrophomonas pneumonia  -Completed 5 days of remdesevir  -Levaquin continued for an additional 7 days since bronchial cultures with pseudomonas MDR susceptible to levaquin. Levaquin discontinued at this point since patient has  likely completed >7 days of therapy for pseudomonas susceptible to antibiotics.     MSK/Skin:   -Wound care team following for bilateral sacral wounds    Disposition: Critical care    ICU Core Measures     Vented Patient  VAP Bundle  VAP bundle ordered     A: Assess, Prevent, and Manage Pain Has pain been assessed? Yes  Need for changes to pain regimen? No   B: Both Spontaneous Awakening Trials (SATs) and Spontaneous Breathing Trials (SBTs) Plan to perform spontaneous awakening trial today? Yes   Plan to perform spontaneous breathing trial today? Yes   Obvious barriers to extubation? No   C: Choice of Sedation RASS Goal: 0 Alert and Calm  Need for changes to sedation or analgesia regimen? Yes   D: Delirium CAM-ICU: Negative   E: Early Mobility  Plan for early mobility? Yes   F: Family Engagement Plan for family engagement today? Yes       Review of Invasive Devices:    [place] Plan: Continue for accurate I/O monitoring for 48 hours  Central access plan: Medications requiring central line  [place] Plan: Keep arterial line for frequent ABGs    Prophylaxis:  VTE VTE covered by:  enoxaparin, Subcutaneous, 40 mg at 02/29/24 0801       Stress Ulcer  covered byfamotidine (PEPCID) tablet 20 mg [[id number]]        Significant 24hr Events     24hr events: Overnight, patient remained on PCMV 15RR, 15/6 with 40%FiO2, and from drips standpoint, was on fentanyl and precedex. This morning, fentanyl drip turned off, precedex set to 1.2. Due to her systolics being around 90s, she was given one time albumin 5%.      Subjective   Patient seen by bedside, will reach out to patients family to discuss medical updates and care plan today.    Review of Systems   Unable to perform ROS: Patient nonverbal        Objective                            Vitals I/O      Most Recent Min/Max in 24hrs   Temp (!) 96.8 °F (36 °C) Temp  Min: 96.8 °F (36 °C)  Max: 98.8 °F (37.1 °C)   Pulse 75 Pulse  Min: 74  Max: 103   Resp 16 Resp  Min: 11  Max: 26   BP  135/58 BP  Min: 135/58  Max: 135/58   O2 Sat 97 % SpO2  Min: 88 %  Max: 99 %   Vent settings: PCMV: RR15, 15/6, 40% FiO2    Drips: Precedex 1.2, insulin drip   Intake/Output Summary (Last 24 hours) at 3/1/2024 0714  Last data filed at 3/1/2024 0539  Gross per 24 hour   Intake 5569.9 ml   Output 3320 ml   Net 2249.9 ml       Diet Enteral/Parenteral; Tube Feeding No Oral Diet; Glucerna 1.2; Continuous; 60; Prosource Protein Liquid - One Packet; 350; Water; Every 4 hours    Invasive Monitoring   Arterial Line  [place] /50  Arterial Line BP  Min: 94/45  Max: 211/89   MAP 69 mmHg  Arterial Line MAP (mmHg)  Min: 58 mmHg  Max: 135 mmHg           Physical Exam   Physical Exam  Vitals reviewed.   Skin:     General: Skin is warm.      Capillary Refill: Capillary refill takes less than 2 seconds.   HENT:      Head: Normocephalic.   Cardiovascular:      Rate and Rhythm: Normal rate and regular rhythm.      Pulses: Normal pulses.   Abdominal: General: Bowel sounds are normal.      Palpations: Abdomen is soft.   Constitutional:       Interventions: She is intubated.      Comments: Patient seen lying in bed, is a CVC, left-sided arterial line, Ortiz.  Also noted to have ETT with NG tube running tube feeds.  Ostomy pouch noted with site CDI.   Pulmonary:      Effort: Pulmonary effort is normal. She is intubated.      Comments: Mechanically ventilated breath sounds  Neurological:      Comments: Patient wakes up to verbal and physical stimuli.  Is able to follow simple commands in all extremities.  She is also able to nod her head.   Genitourinary/Anorectal:  Ortiz present.          Diagnostic Studies      EKG: Reviewed  Imaging: Reviewed I have personally reviewed pertinent reports.       Medications:  Scheduled PRN   acetaminophen, 650 mg, Q6H  Albumin 5%, 12.5 g, Once  chlorhexidine, 15 mL, Q12H SARTHAK  enoxaparin, 40 mg, Q24H SARTHAK  famotidine, 20 mg, BID  gabapentin, 200 mg, TID  ipratropium, 0.5 mg, TID  lamoTRIgine, 150 mg,  BID  levalbuterol, 1.25 mg, TID  methylPREDNISolone sodium succinate, 125 mg, Q12H SARTHAK  nystatin, , BID  OLANZapine, 5 mg, Q12H  oxyCODONE, 5 mg, Q6H  polyethylene glycol, 17 g, Daily  senna, 1 tablet, BID  topiramate, 100 mg, BID  venlafaxine, 25 mg, TID With Meals      HYDROmorphone, 0.5 mg, Q1H PRN  midazolam, 2 mg, Q4H PRN  ondansetron, 4 mg, Q6H PRN       Continuous    dexmedetomidine, 0.1-1.2 mcg/kg/hr, Last Rate: 1.2 mcg/kg/hr (03/01/24 0650)  HYDROmorphone, 0.5 mg/hr, Last Rate: Stopped (03/01/24 0650)  insulin regular (HumuLIN R,NovoLIN R) 1 Units/mL in sodium chloride 0.9 % 100 mL infusion, 0.3-21 Units/hr, Last Rate: 2 Units/hr (03/01/24 0538)         Labs:    CBC    Recent Labs     02/29/24  0429 02/29/24  0552 02/29/24  1541   WBC 10.82*  --  11.64*   HGB 6.4* 6.3* 7.5*   HCT 21.5* 21.3* 24.2*   Platelets at 89 this morning BMP    Recent Labs     02/29/24  1753 03/01/24  0538   SODIUM 146 142   K 4.6 4.0    105   CO2 33* 33*   AGAP 7 4   BUN 33* 34*   CREATININE 0.58* 0.52*   CALCIUM 10.6* 10.5*       Coags    No recent results     Additional Electrolytes  Recent Labs     02/29/24  0429   MG 2.0   PHOS 1.8*          Blood Gas    Recent Labs     02/29/24  0826   PHART 7.461*   NIC3QIA 44.1*   PO2ART 67.8*   [id number] 30.7*   BEART 6.3   SOURCE Line, Arterial     Recent Labs     02/29/24  0826   SOURCE Line, Arterial    LFTs  No recent results    Infectious  No recent results  Glucose  Recent Labs     02/28/24  1818 02/29/24  0429 02/29/24  1753 03/01/24  0538   GLUC 153* 136 166* 120               Miguel Whittaker MD

## 2024-03-01 NOTE — ASSESSMENT & PLAN NOTE
Code Status: full - Level 1  Decisional apparatus:  Patient is not competent on my exam today.  If competence is lost, patient's substitute decision maker would default to spouse, Min, by [place] Act 169.  Advance Directive / Living Will / POLST:  none  Patient is extubated to BiPAP. Goal to continue medical optimization. However, if re-intubated will need to readdress trach.  Otherwise, patient's  has mentioned important goal of patient experiencing daughter's wedding later this year if possible.

## 2024-03-01 NOTE — ASSESSMENT & PLAN NOTE
Patient was extubated to BiPAP today. Doing well during time of encounter.  Patient remains full code. Patient's  understands if she is re-intubated will need to readdress trach.

## 2024-03-01 NOTE — UTILIZATION REVIEW
"Continued Stay Review    Date: 03/01                          Current Patient Class: IP  Current Level of Care: Level 2 stepdown/HOT    HPI:59 y.o. female initially admitted on 01/10     Assessment/Plan: Pt was weaning well and tolerating PSV yesterday, became agitated with hypoxia and required return to PCV and increased FiO2/PEEP which was weaned through day.  This am, weaning sedation she was appropriate and following commands, placed on SBT with RSBI 90's after 30-45min, extubated to BiPAP support.    Found systolics being around 90s, she was given one time albumin 5%.    Plan:Continue BiPAP with weaning to HFNC, close monitoring for respiratory fatigue, continue solumedrol 125mg q12hrs. Hold TFs, can resume tomorrow once resp stable. Cont NGT for now, mon ostomy output. trend NA, replete electrolytes, cont trial of diuresis now goal negative 500-1000cc as BP will permit. Cont Insulin gtt.       Vital Signs: /69   Pulse (!) 133   Temp 97.7 °F (36.5 °C) (Esophageal)   Resp (!) 25   Ht 5' 8\" (1.727 m)   Wt 75.5 kg (166 lb 7.2 oz)   SpO2 93%   BMI 25.31 kg/m²       Pertinent Labs/Diagnostic Results:   EKG result: NSR      Results from last 7 days   Lab Units 03/01/24  0538 02/29/24  1541 02/29/24  0552 02/29/24  0429 02/28/24  0638 02/27/24  0946 02/27/24  0437 02/26/24  1446 02/25/24  2300 02/25/24  0419   WBC Thousand/uL 11.69* 11.64*  --  10.82*  --    < > 7.67 14.26*  --  8.19   HEMOGLOBIN g/dL 7.8* 7.5* 6.3* 6.4* 7.4*   < > 7.3* 10.0*  --  10.0*   I STAT HEMOGLOBIN   --   --   --   --   --   --   --   --    < >  --    HEMATOCRIT % 25.5* 24.2* 21.3* 21.5* 24.1*   < > 24.5* 32.1*  --  31.3*   HEMATOCRIT, ISTAT   --   --   --   --   --   --   --   --    < >  --    PLATELETS Thousands/uL 89*  --   --   --   --   --  104* 139*  --  103*   BANDS PCT % 3  --   --   --   --   --   --  13*  --  8    < > = values in this interval not displayed.         Results from last 7 days   Lab Units 03/01/24  0538 " 02/29/24  1753 02/29/24  0429 02/28/24  1818 02/28/24  0430 02/27/24  1939 02/27/24  0437 02/26/24  0130 02/25/24  2301 02/25/24  2300 02/25/24  0844 02/25/24  0419 02/24/24  0817 02/24/24  0413   SODIUM mmol/L 142 146 151* 150* 150*   < > 153*   < > 155*  --    < > 149*   < > 146   POTASSIUM mmol/L 4.0 4.6 3.2* 3.8 3.0*   < > 3.5   < > 3.9  --    < > 3.3*   < > 3.9   CHLORIDE mmol/L 105 106 109* 109* 111*   < > 115*   < > 108  --    < > 109*   < > 110*   CO2 mmol/L 33* 33* 34* 32 33*   < > 33*   < > 39*  --    < > 34*   < > 30   CO2, I-STAT mmol/L  --   --   --   --   --   --   --   --   --  38*  --   --   --   --    ANION GAP mmol/L 4 7 8 9 6   < > 5   < > 8  --    < > 6   < > 6   BUN mg/dL 34* 33* 34* 33* 31*   < > 32*   < > 32*  --    < > 37*   < > 43*   CREATININE mg/dL 0.52* 0.58* 0.64 0.72 0.58*   < > 0.61   < > 0.62  --    < > 0.59*   < > 0.70   EGFR ml/min/1.73sq m 105 101 98 92 101   < > 100   < > 99  --    < > 101   < > 95   CALCIUM mg/dL 10.5* 10.6* 10.8* 10.7* 10.5*   < > 10.1   < > 9.9  --    < > 10.1   < > 10.5*   CALCIUM, IONIZED mmol/L  --   --   --   --   --   --   --   --  1.31  --   --   --   --   --    CALCIUM, IONIZED, ISTAT mmol/L  --   --   --   --   --   --   --   --   --  1.39*  --   --   --   --    MAGNESIUM mg/dL 2.0  --  2.0  --   --   --  2.0  --   --   --   --  1.7*  --  1.6*   PHOSPHORUS mg/dL  --   --  1.8*  --   --   --  3.3  --   --   --   --   --   --   --     < > = values in this interval not displayed.     Results from last 7 days   Lab Units 02/26/24  1446 02/25/24  2301   AST U/L 11* 10*   ALT U/L 16 17   ALK PHOS U/L 63 68   TOTAL PROTEIN g/dL 4.9* 4.8*   ALBUMIN g/dL 2.5* 2.4*   TOTAL BILIRUBIN mg/dL 0.78 0.70   BILIRUBIN DIRECT mg/dL 0.60*  --      Results from last 7 days   Lab Units 03/01/24  0537 03/01/24  0403 03/01/24  0225 02/29/24  2357 02/29/24  2008 02/29/24  1752 02/29/24  1658 02/29/24  1518 02/29/24  1200 02/29/24  1018 02/29/24  0747 02/29/24  0551   POC  "GLUCOSE mg/dl 126 142* 133 169* 148* 166* 139 131 183* 170* 200* 108     Results from last 7 days   Lab Units 03/01/24  0538 02/29/24  1753 02/29/24  0429 02/28/24  1818 02/28/24  0430 02/27/24  1939 02/27/24  0437 02/26/24  1446 02/26/24  0130 02/25/24  2301 02/25/24  1942 02/25/24  1646   GLUCOSE RANDOM mg/dL 120 166* 136 153* 147* 149* 136 219* 135 170* 128 95     Results from last 7 days   Lab Units 02/24/24  0413   OSMOLALITY, SERUM mmol/*         No results found for: \"BETA-HYDROXYBUTYRATE\"   Results from last 7 days   Lab Units 03/01/24  0720 02/29/24  0826 02/28/24 2158 02/28/24 1818   PH ART  7.467* 7.461* 7.449 7.412   PCO2 ART mm Hg 41.4 44.1* 47.4* 50.0*   PO2 ART mm Hg 77.5 67.8* 81.5 74.8*   HCO3 ART mmol/L 29.3* 30.7* 32.1* 31.1*   BASE EXC ART mmol/L 5.1 6.3 7.3 5.6   O2 CONTENT ART mL/dL 11.1* 7.6* 11.4* 13.7*   O2 HGB, ARTERIAL % 94.0 91.5* 94.1 93.8*   ABG SOURCE   --  Line, Arterial Line, Arterial Line, Arterial     Results from last 7 days   Lab Units 02/26/24 0728   PH NICA  7.288*   PCO2 NICA mm Hg 71.2*   PO2 NICA mm Hg 46.5*   HCO3 NICA mmol/L 33.3*   BASE EXC NICA mmol/L 4.8   O2 CONTENT NICA ml/dL 12.9   O2 HGB, VENOUS % 77.3     Results from last 7 days   Lab Units 02/25/24  2300   I STAT BASE EXC mmol/L 9*   I STAT O2 SAT % 79   ISTAT PH ART  7.331*   I STAT ART PCO2 mm HG 68.7*   I STAT ART PO2 mm HG 48.0*   I STAT ART HCO3 mmol/L 36.3*             Results from last 7 days   Lab Units 02/29/24  1157 02/26/24  1446 02/25/24  2301   D-DIMER QUANTITATIVE ug/ml FEU 1.64* 1.97* 1.58*                 Results from last 7 days   Lab Units 02/26/24  0130 02/25/24  2301 02/24/24  1245   LACTIC ACID mmol/L 2.0 3.1* 1.4                         Results from last 7 days   Lab Units 03/01/24  0555   UNIT PRODUCT CODE  [id number]   UNIT NUMBER  [id number]   UNITABO  A   UNITRH  NEG   CROSSMATCH  Compatible   UNIT DISPENSE STATUS  Presumed Trans   UNIT PRODUCT VOL mL 350         Results from last " 7 days   Lab Units 02/26/24  0130   LIPASE u/L 71     Results from last 7 days   Lab Units 02/29/24  0429 02/28/24  0430 02/27/24  0143 02/26/24  1446   CRP mg/L 63.1* 133.9* 291.3* 338.1*         Results from last 7 days   Lab Units 02/24/24  0413   OSMOLALITY, SERUM mmol/*                                     Results from last 7 days   Lab Units 02/26/24  1251 02/26/24  1144   BLOOD CULTURE  No Growth After 4 Days. No Growth After 4 Days.                   Medications:   Scheduled Medications:  acetaminophen, 650 mg, Oral, Q6H  Albumin 5%, 12.5 g, Intravenous, Once  chlorhexidine, 15 mL, Mouth/Throat, Q12H SARTHAK  enoxaparin, 40 mg, Subcutaneous, Q24H SARTHAK  famotidine, 20 mg, Oral, BID  gabapentin, 100 mg, Oral, TID  ipratropium, 0.5 mg, Nebulization, TID  lamoTRIgine, 150 mg, Oral, BID  levalbuterol, 1.25 mg, Nebulization, TID  methylPREDNISolone sodium succinate, 125 mg, Intravenous, Q12H SARTHAK  nystatin, , Topical, BID  OLANZapine, 5 mg, Oral, Q12H  polyethylene glycol, 17 g, Per NG Tube, Daily  senna, 1 tablet, Per NG Tube, BID  topiramate, 100 mg, Per PEG Tube, BID  venlafaxine, 25 mg, Per NG Tube, TID With Meals  HYDROmorphone (DILAUDID) 50 mg in sodium chloride 0.9% 50mL drip  Rate: 0.5 mL/hr Dose: 0.5 mg/hr  Freq: Continuous Route: IV  Last Dose: Stopped (03/01/24 0650)  Start: 02/21/24 1200 End: 03/01/24 1012     Continuous IV Infusions:  insulin regular (HumuLIN R,NovoLIN R) 1 Units/mL in sodium chloride 0.9 % 100 mL infusion, 0.3-21 Units/hr, Intravenous, Titrated  dexmedeTOMIDine (Precedex) 400 mcg in sodium chloride 0.9% 100 mL  Rate: 1.8-22 mL/hr Dose: 0.1-1.2 mcg/kg/hr  Weight Dosing Info: 73.2 kg  Freq: Titrated Route: IV  Last Dose: Stopped (03/01/24 1000)  Start: 02/16/24 1015 End: 03/01/24 1147       PRN Meds:  HYDROmorphone, 0.2 mg, Intravenous, Q2H PRN  midazolam, 2 mg, Intravenous, Q4H PRN  ondansetron, 4 mg, Intravenous, Q6H PRN  oxyCODONE, 2.5 mg, Oral, Q4H PRN   Or  oxyCODONE, 5 mg, Oral,  Q4H PRN        Discharge Plan: TBD    Network Utilization Review Department  ATTENTION: Please call with any questions or concerns to [phone] and carefully listen to the prompts so that you are directed to the right person. All voicemails are confidential.   For Discharge needs, contact Care Management DC Support Team at [phone] opt. [phone]  Send all requests for admission clinical reviews, approved or denied determinations and any other requests to dedicated fax number below belonging to the campus where the patient is receiving treatment. List of dedicated fax numbers for the Facilities:  FACILITY NAME UR FAX NUMBER   ADMISSION DENIALS (Administrative/Medical Necessity) [phone]   DISCHARGE SUPPORT TEAM (NETWORK) [phone]   PARENT CHILD HEALTH (Maternity/NICU/Pediatrics) [phone]   [facility] [phone]   [facility] [phone]   [facility] [phone]   [facility] [phone]   [facility] [phone]   [facility] [phone]   [facility] [phone]   [facility] [phone]   [facility] [phone]   [facility] [phone]   [facility] [phone]   [facility] [phone]

## 2024-03-01 NOTE — PROGRESS NOTES
[facility]  Progress Note  Name: Carla Hunt I  MRN: [id number]  Unit/Bed#: MICU 10 I Date of Admission: 1/10/2024   Date of Service: 3/1/2024 I Hospital Day: 51    Assessment/Plan   Goals of care, counseling/discussion  Assessment & Plan  Code Status: full - Level 1  Decisional apparatus:  Patient is not competent on my exam today.  If competence is lost, patient's substitute decision maker would default to spouse, Min, by [place] 169.  Advance Directive / Living Will / POLST:  none  Patient is extubated to BiPAP. Goal to continue medical optimization. However, if re-intubated will need to readdress trach.  Otherwise, patient's  has mentioned important goal of patient experiencing daughter's wedding later this year if possible.     Palliative care patient  Assessment & Plan  PSC, including MSW support, will follow closely to continue to provide supportive care as clinical situation evolves.   Encouraged patient/family to utilize letter boards for communication while verbal communication and writing may be taxing.  Decisional apparatus:  Patient is not competent on my exam today.  If competence is lost, patient's substitute decision maker would default to spouse by [place] Act 169.  Advance Directive / Living Will / POLST:  None on file, unable to complete  Will return on Monday 3/4 for continuity and support. Page sooner with questions or concerns.     Teto marginal zone B-cell lymphoma (HCC)  Assessment & Plan  On maintenance therapy    * Acute respiratory failure with hypoxia (HCC)  Assessment & Plan  Patient was extubated to BiPAP today. Doing well during time of encounter.  Patient remains full code. Patient's  understands if she is re-intubated will need to readdress trach.            Interval history:       Patient has been extubated to BiPAP. Alert during time of encounter but is very weak. Attempts to verbalize but is difficult. Encouraged patient  to utilize letter board. Provided supportive presence. Patient was accompanied by her  and brother during time of encounter.    MEDICATIONS / ALLERGIES:     all current active meds have been reviewed    No Known Allergies    OBJECTIVE:    Physical Exam  Physical Exam  HENT:      Head: Atraumatic.   Eyes:      Conjunctiva/sclera: Conjunctivae normal.   Pulmonary:      Comments: On continuous BiPAP  Abdominal:      Tenderness: There is no guarding.      Comments: NGT in place   Musculoskeletal:         General: Swelling present.   Skin:     General: Skin is warm and dry.   Neurological:      Mental Status: She is alert.      Comments: Alert, difficult to verbalize. Followed one step commands   Psychiatric:      Comments: calm         Lab Results:   Results from last 7 days   Lab Units 03/01/24  0538 02/29/24  1541 02/29/24  0552 02/29/24  0429 02/27/24  0946 02/27/24  0437 02/26/24  1446 02/25/24  2300 02/25/24  0419   WBC Thousand/uL 11.69* 11.64*  --  10.82*   < > 7.67 14.26*  --  8.19   HEMOGLOBIN g/dL 7.8* 7.5* 6.3* 6.4*   < > 7.3* 10.0*  --  10.0*   I STAT HEMOGLOBIN   --   --   --   --   --   --   --    < >  --    HEMATOCRIT % 25.5* 24.2* 21.3* 21.5*   < > 24.5* 32.1*  --  31.3*   HEMATOCRIT, ISTAT   --   --   --   --   --   --   --    < >  --    PLATELETS Thousands/uL 89*  --   --   --   --  104* 139*  --  103*   MONO PCT % 0*  --   --   --   --   --  1*  --  2*   EOS PCT % 0  --   --   --   --   --  0  --  3    < > = values in this interval not displayed.     Results from last 7 days   Lab Units 03/01/24  0538 02/29/24  1753 02/29/24  0429 02/27/24  0437 02/26/24  1446 02/26/24  0130 02/25/24  2301 02/25/24  2300   POTASSIUM mmol/L 4.0 4.6 3.2*   < > 5.3   < > 3.9  --    CHLORIDE mmol/L 105 106 109*   < > 114*   < > 108  --    CO2 mmol/L 33* 33* 34*   < > 29   < > 39*  --    CO2, I-STAT mmol/L  --   --   --   --   --   --   --  38*   BUN mg/dL 34* 33* 34*   < > 33*   < > 32*  --    CREATININE mg/dL  0.52* 0.58* 0.64   < > 0.64   < > 0.62  --    CALCIUM mg/dL 10.5* 10.6* 10.8*   < > 9.5   < > 9.9  --    ALK PHOS U/L  --   --   --   --  63  --  68  --    ALT U/L  --   --   --   --  16  --  17  --    AST U/L  --   --   --   --  11*  --  10*  --    GLUCOSE, ISTAT mg/dl  --   --   --   --   --   --   --  176*    < > = values in this interval not displayed.       Imaging Studies: reviewed pertinent studies   EKG, Pathology, and Other Studies: reviewed pertinent studies    Counseling / Coordination of Care    Total floor / unit time spent today 30 minutes. Greater than 50% of total time was spent with the patient and / or family counseling and / or coordination of care. A description of the counseling / coordination of care: time spent assessing patient, communicating with RN, primary team, providing support to patient's  and brother at bedside.

## 2024-03-01 NOTE — PROGRESS NOTES
Progress Note - Infectious Disease   Carla Hunt 59 y.o. female MRN: [id number]  [facility]: [id number]      Impression/Recommendations:  Possible bacterial pneumonia.  Initially, on admission early January, secondary to mostly COVID but there was concern for concurrent bacterial pneumonia on admission due to elevated procalcitonin. Patient completed treatment course for both COVID and bacterial pneumonia.  She was clinically improved with decreasing O2 requirement.  However, patient deteriorated in late January, requiring to be placed back on high flow O2 support.  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  Procalcitonin x 3 were in the normal range.  Patient has no fever or leukocytosis.  She improved with increasing steroid dose.  She completed a 5-day course of Bactrim/minocycline for presumptive stenotrophomonas respiratory infection, with expectorated sputum growing stenotrophomonas.  She subsequently had bronchoscopy, with BAL culture negative for bacterial growth, AFB and PCP but completed the course of Bactrim/minocycline prior to bronchoscopy.  Patient improved and O2 was being weaned again until 2 weeks ago, when she deteriorated again.  She is now back in ICU.  Expectorated sputum once again is growing stenotrophomonas.  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  Not sure that the stenotrophomonas that is growing again and expectorated sputum is pathogenic versus upper airway colonization.  Patient was restarted on high-dose steroid and Bactrim.  Patient's respiratory status remained tenuous throughout, required intubation subsequently.  She had bronchoscopy on 2/16 with moderate secretion.  BAL culture had no growth but COVID PCR was positive.  Bactrim was changed to Levaquin over weekend due to ELIESER.  Remdesivir was restarted for possible COVID relapse.  Due to worsening consolidations on CT,  patient is status post repeat bronchoscopy on 2/21, with some purulent secretion.  BAL culture had light growth of Pseudomonas and stenotrophomonas.  Given that patient's Pseudomonas isolate is susceptible to Levaquin and stenotrophomonas isolate is susceptible to both Bactrim and Levaquin and with patient having been on Levaquin for the last 10 days (and Bactrim for the prior 10 days), both Pseudomonas and stenotrophomonas isolates are most likely airway colonization.  Growth of Candida is most likely airway colonization also.  Repeat blood cultures have no growth.  Patient completed empiric antibiotic course.  Observe off further antibiotic.    Monitor temperature/WBC.  Monitor respiratory status.  Follow-up repeat blood cultures.     Severe COVID, present on admission.  Patient was treated with a 10-day course of remdesivir, dexamethasone and was given 1 dose of Tocilizumab on admission.  She also had a course of antibiotic initially for presumptive bacterial superinfection.  COVID antigen was negative prior to coming off isolation.  Due to positive COVID PCR in BAL, patient completed another 5-day course of remdesivir.  Patient has remained on systemic corticosteroid throughout her hospitalization.  No further antiviral necessary.     Acute hypoxic respiratory failure, likely multifactorial, with both COVID and bacterial ammonia contributing, with patient back on ventilator.  Patient was slowly improving but deteriorated over the last 24 hours.  She is requiring increasing ventilator support and pressor support.  The last 2 times that she deteriorated was when steroid was being weaned.  With her current steroid being weaned over the last week, I suspect her latest deterioration was once again secondary to steroid weaning.  Patient's steroid dose was increased.  Her respiratory status and ventilatory support is improving again, with her weaning again.  In addition, temperature is now down.  Ventilator support and  weaning per critical care medicine service.  Continue and maintain high-dose steroid for a more extended period of time.     CKD.  Creatinine worsened on Bactrim.  Patient is now off Bactrim.  Creatinine has normalized.  Monitor creatinine.     5. 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  Patient is status post exploratory laparotomy with ileocecectomy and end ileostomy creation.  Surgery follow-up.     B-cell lymphoma, on chemotherapy, which is currently postponed.  Patient was leukopenic on admission but resolved.  Monitor WBC/ANC.     Discussed with Dr. Cardenas from critical care medicine service regarding patient's continued improvement with increasing steroid dose and plan to keep her off antibiotic.  He is in agreement and plans to continue ventilator weaning with plan for extubation eventually.     Antibiotics:  Off antibiotic     Subjective:  Patient remains on ventilator in ICU but continues to improve.  She opens eyes and attempts to communicate.  Temperature stays down.  No diarrhea.     Objective:  Vitals:  Temp:  [96.8 °F (36 °C)-98.8 °F (37.1 °C)] 97.7 °F (36.5 °C)  HR:  [] 112  Resp:  [14-27] 27  BP: (120-122)/(70-75) 122/75  SpO2:  [93 %-99 %] 96 %  Temp (24hrs), Av.7 °F (36.5 °C), Min:96.8 °F (36 °C), Max:98.8 °F (37.1 °C)  Current: Temperature: 97.7 °F (36.5 °C)    Physical Exam:     General: Remains on ventilator.  Opens eyes and attempts to communicate.  Comfortable.  Nontoxic.   Neck:  Supple. No mass.  No lymphadenopathy.   Lungs: Expansion symmetric, stable mild diffuse rhonchi, no rales, no wheezing,.   Heart:  Regular rate and rhythm, S1 and S2 normal, no murmur.   Abdomen: Soft, nondistended, non-tender, bowel sounds active all four quadrants, no masses, no organomegaly.   Extremities: Stable leg edema. No erythema/warmth. No ulcer. Nontender to palpation.   Skin:  No rash.   Neuro: Not assessable.     Invasive Devices       Central Venous Catheter Line  Duration              CVC Central Lines 02/20/24 9 days              Arterial Line  Duration             Arterial Line 02/14/24 Radial 16 days              Drain  Duration             Urethral Catheter Latex 16 Fr. 10 days    Ileostomy RUQ 9 days    NG/OG/Enteral Tube Nasogastric 18 Fr Left nare 9 days                    Labs studies:   I have personally reviewed pertinent labs.  Results from last 7 days   Lab Units 03/01/24  0538 02/29/24  1753 02/29/24  0429 02/27/24  0437 02/26/24  1446 02/26/24  0130 02/25/24  2301 02/25/24  2300   POTASSIUM mmol/L 4.0 4.6 3.2*   < > 5.3   < > 3.9  --    CHLORIDE mmol/L 105 106 109*   < > 114*   < > 108  --    CO2 mmol/L 33* 33* 34*   < > 29   < > 39*  --    CO2, I-STAT mmol/L  --   --   --   --   --   --   --  38*   BUN mg/dL 34* 33* 34*   < > 33*   < > 32*  --    CREATININE mg/dL 0.52* 0.58* 0.64   < > 0.64   < > 0.62  --    EGFR ml/min/1.73sq m 105 101 98   < > 98   < > 99  --    GLUCOSE, ISTAT mg/dl  --   --   --   --   --   --   --  176*   CALCIUM mg/dL 10.5* 10.6* 10.8*   < > 9.5   < > 9.9  --    AST U/L  --   --   --   --  11*  --  10*  --    ALT U/L  --   --   --   --  16  --  17  --    ALK PHOS U/L  --   --   --   --  63  --  68  --     < > = values in this interval not displayed.     Results from last 7 days   Lab Units 03/01/24  0538 02/29/24  1541 02/29/24  0552 02/29/24  0429 02/27/24  0946 02/27/24  0437 02/26/24  1446   WBC Thousand/uL 11.69* 11.64*  --  10.82*   < > 7.67 14.26*   HEMOGLOBIN g/dL 7.8* 7.5* 6.3* 6.4*   < > 7.3* 10.0*   PLATELETS Thousands/uL 89*  --   --   --   --  104* 139*    < > = values in this interval not displayed.     Results from last 7 days   Lab Units 02/26/24  1251 02/26/24  1144   BLOOD CULTURE  No Growth at 72 hrs. No Growth at 72 hrs.       Imaging Studies:   I have personally reviewed pertinent imaging study reports and images in PACS.    EKG, Pathology, and Other Studies:   I have personally reviewed pertinent reports.

## 2024-03-01 NOTE — PLAN OF CARE
Problem: Prexisting or High Potential for Compromised Skin Integrity  Goal: Skin integrity is maintained or improved  Description: INTERVENTIONS:  - Identify patients at risk for skin breakdown  - Assess and monitor skin integrity  - Assess and monitor nutrition and hydration status  - Monitor labs   - Assess for incontinence   - Turn and reposition patient  - Assist with mobility/ambulation  - Relieve pressure over bony prominences  - Avoid friction and shearing  - Provide appropriate hygiene as needed including keeping skin clean and dry  - Evaluate need for skin moisturizer/barrier cream  - Collaborate with interdisciplinary team   - Patient/family teaching  - Consider wound care consult   Outcome: Progressing     Problem: Nutrition/Hydration-ADULT  Goal: Nutrient/Hydration intake appropriate for improving, restoring or maintaining nutritional needs  Description: Monitor and assess patient's nutrition/hydration status for malnutrition. Collaborate with interdisciplinary team and initiate plan and interventions as ordered.  Monitor patient's weight and dietary intake as ordered or per policy. Utilize nutrition screening tool and intervene as necessary. Determine patient's food preferences and provide high-protein, high-caloric foods as appropriate.     INTERVENTIONS:  - Monitor oral intake, urinary output, labs, and treatment plans  - Assess nutrition and hydration status and recommend course of action  - Evaluate amount of meals eaten  - Assist patient with eating if necessary   - Allow adequate time for meals  - Recommend/ encourage appropriate diets, oral nutritional supplements, and vitamin/mineral supplements  - Order, calculate, and assess calorie counts as needed  - Recommend, monitor, and adjust tube feedings and TPN/PPN based on assessed needs  - Assess need for intravenous fluids  - Provide specific nutrition/hydration education as appropriate  - Include patient/family/caregiver in decisions related to  nutrition  Outcome: Progressing     Problem: SAFETY,RESTRAINT: NV/NON-SELF DESTRUCTIVE BEHAVIOR  Goal: Remains free of harm/injury (restraint for non violent/non self-detsructive behavior)  Description: INTERVENTIONS:  - Instruct patient/family regarding restraint use   - Assess and monitor physiologic and psychological status   - Provide interventions and comfort measures to meet assessed patient needs   - Identify and implement measures to help patient regain control  - Assess readiness for release of restraint   Outcome: Progressing  Goal: Returns to optimal restraint-free functioning  Description: INTERVENTIONS:  - Assess the patient's behavior and symptoms that indicate continued need for restraint  - Identify and implement measures to help patient regain control  - Assess readiness for release of restraint   Outcome: Progressing     Problem: INFECTION - ADULT  Goal: Absence or prevention of progression during hospitalization  Description: INTERVENTIONS:  - Assess and monitor for signs and symptoms of infection  - Monitor lab/diagnostic results  - Monitor all insertion sites, i.e. indwelling lines, tubes, and drains  - Monitor endotracheal if appropriate and nasal secretions for changes in amount and color  - [place] appropriate cooling/warming therapies per order  - Administer medications as ordered  - Instruct and encourage patient and family to use good hand hygiene technique  - Identify and instruct in appropriate isolation precautions for identified infection/condition  Outcome: Progressing     Problem: SAFETY ADULT  Goal: Patient will remain free of falls  Description: INTERVENTIONS:  - Educate patient/family on patient safety including physical limitations  - Instruct patient to call for assistance with activity   - Consult OT/PT to assist with strengthening/mobility   - Keep Call bell within reach  - Keep bed low and locked with side rails adjusted as appropriate  - Keep care items and personal  belongings within reach  - Initiate and maintain comfort rounds  - Make Fall Risk Sign visible to staff  - Offer Toileting every 2 Hours, in advance of need  - Initiate/Maintain bed alarm  - Obtain necessary fall risk management equipment: non skid footwear  - Apply yellow socks and bracelet for high fall risk patients  - Consider moving patient to room near nurses station  Outcome: Progressing     Problem: RESPIRATORY - ADULT  Goal: Achieves optimal ventilation and oxygenation  Description: INTERVENTIONS:  - Assess for changes in respiratory status  - Assess for changes in mentation and behavior  - Position to facilitate oxygenation and minimize respiratory effort  - Oxygen administered by appropriate delivery if ordered  - Initiate smoking cessation education as indicated  - Encourage broncho-pulmonary hygiene including cough, deep breathe, Incentive Spirometry  - Assess the need for suctioning and aspirate as needed  - Assess and instruct to report SOB or any respiratory difficulty  - Respiratory Therapy support as indicated  Outcome: Progressing     Problem: SKIN/TISSUE INTEGRITY - ADULT  Goal: Skin Integrity remains intact(Skin Breakdown Prevention)  Description: Assess:  -Perform Flavio assessment every shift   -Clean and moisturize skin every day   -Inspect skin when repositioning, toileting, and assisting with ADLS  -Assess under medical devices such as ronny  every hour   -Assess extremities for adequate circulation and sensation     Bed Management:  -Have minimal linens on bed & keep smooth, unwrinkled  -Change linens as needed when moist or perspiring  -Avoid sitting or lying in one position for more than 2 hours while in bed  -Keep HOB at 45 degrees     Toileting:  -Offer bedside commode  -Assess for incontinence every hour  -Use incontinent care products after each incontinent episode such as moisture barrier cream     Activity:  -Mobilize patient 3 times a day  -Encourage activity and walks on  unit  -Encourage or provide ROM exercises   -Turn and reposition patient every 2 Hours  -Use appropriate equipment to lift or move patient in bed  -Instruct/ Assist with weight shifting every hour when out of bed in chair  -Consider limitation of chair time 2 hour intervals    Skin Care:  -Avoid use of baby powder, tape, friction and shearing, hot water or constrictive clothing  -Relieve pressure over bony prominences using allevyn   -Do not massage red bony areas    Next Steps:  -Teach patient strategies to minimize risks such as weight shifting    -Consider consults to  interdisciplinary teams such as PT  Outcome: Progressing  Goal: Incision(s), wounds(s) or drain site(s) healing without S/S of infection  Description: INTERVENTIONS  - Assess and document dressing, incision, wound bed, drain sites and surrounding tissue  - Provide patient and family education  - Perform skin care/dressing changes every 12 hr  Outcome: Progressing  Goal: Pressure injury heals and does not worsen  Description: Interventions:  - Implement low air loss mattress or specialty surface (Criteria met)  - Apply silicone foam dressing  - Instruct/assist with weight shifting every 120 minutes when in chair   - Limit chair time to 2 hour intervals  - Use special pressure reducing interventions such as wedges when in chair   - Apply fecal or urinary incontinence containment device   - Perform passive or active ROM every 4 hr  - Turn and reposition patient & offload bony prominences every 2 hours   - Utilize friction reducing device or surface for transfers      Problem: NEUROSENSORY - ADULT  Goal: Achieves stable or improved neurological status  Description: INTERVENTIONS  - Monitor and report changes in neurological status  - Monitor vital signs such as temperature, blood pressure, glucose, and any other labs ordered   - Initiate measures to prevent increased intracranial pressure  - Monitor for seizure activity and implement precautions if  appropriate      Outcome: Progressing  Goal: Achieves maximal functionality and self care  Description: INTERVENTIONS  - Monitor swallowing and airway patency with patient fatigue and changes in neurological status  - Encourage and assist patient to increase activity and self care.   - Encourage visually impaired, hearing impaired and aphasic patients to use assistive/communication devices  Outcome: Progressing     Problem: CARDIOVASCULAR - ADULT  Goal: Maintains optimal cardiac output and hemodynamic stability  Description: INTERVENTIONS:  - Monitor I/O, vital signs and rhythm  - Monitor for S/S and trends of decreased cardiac output  - Administer and titrate ordered vasoactive medications to optimize hemodynamic stability  - Assess quality of pulses, skin color and temperature  - Assess for signs of decreased coronary artery perfusion  - Instruct patient to report change in severity of symptoms  Outcome: Progressing  Goal: Absence of cardiac dysrhythmias or at baseline rhythm  Description: INTERVENTIONS:  - Continuous cardiac monitoring, vital signs, obtain 12 lead EKG if ordered  - Administer antiarrhythmic and heart rate control medications as ordered  - Monitor electrolytes and administer replacement therapy as ordered  Outcome: Progressing     Problem: GASTROINTESTINAL - ADULT  Goal: Minimal or absence of nausea and/or vomiting  Description: INTERVENTIONS:  - Administer IV fluids if ordered to ensure adequate hydration  - Maintain NPO status until nausea and vomiting are resolved  - Nasogastric tube if ordered  - Administer ordered antiemetic medications as needed  - Provide nonpharmacologic comfort measures as appropriate  - Advance diet as tolerated, if ordered  - Consider nutrition services referral to assist patient with adequate nutrition and appropriate food choices  Outcome: Progressing  Goal: Maintains or returns to baseline bowel function  Description: INTERVENTIONS:  - Assess bowel function  -  Encourage oral fluids to ensure adequate hydration  - Administer IV fluids if ordered to ensure adequate hydration  - Administer ordered medications as needed  - Encourage mobilization and activity  - Consider nutritional services referral to assist patient with adequate nutrition and appropriate food choices  Outcome: Progressing  Goal: Maintains adequate nutritional intake  Description: INTERVENTIONS:  - Monitor percentage of each meal consumed  - Identify factors contributing to decreased intake, treat as appropriate  - Assist with meals as needed  - Monitor I&O, weight, and lab values if indicated  - Obtain nutrition services referral as needed  Outcome: Progressing  Goal: Establish and maintain optimal ostomy function  Description: INTERVENTIONS:  - Assess bowel function  - Encourage oral fluids to ensure adequate hydration  - Administer IV fluids if ordered to ensure adequate hydration   - Administer ordered medications as needed  - Encourage mobilization and activity  - Nutrition services referral to assist patient with appropriate food choices  - Assess stoma site  - Consider wound care consult   Outcome: Progressing  Goal: Oral mucous membranes remain intact  Description: INTERVENTIONS  - Assess oral mucosa and hygiene practices  - Implement preventative oral hygiene regimen  - Implement oral medicated treatments as ordered  - Initiate Nutrition services referral as needed  Outcome: Progressing     Problem: GENITOURINARY - ADULT  Goal: Maintains or returns to baseline urinary function  Description: INTERVENTIONS:  - Assess urinary function  - Encourage oral fluids to ensure adequate hydration if ordered  - Administer IV fluids as ordered to ensure adequate hydration  - Administer ordered medications as needed  - Offer frequent toileting  - Follow urinary retention protocol if ordered  Outcome: Progressing  Goal: Urinary catheter remains patent  Description: INTERVENTIONS:  - Assess patency of urinary  catheter  - If patient has a chronic marie, consider changing catheter if non-functioning  - Follow guidelines for intermittent irrigation of non-functioning urinary catheter  Outcome: Progressing     Problem: METABOLIC, FLUID AND ELECTROLYTES - ADULT  Goal: Electrolytes maintained within normal limits  Description: INTERVENTIONS:  - Monitor labs and assess patient for signs and symptoms of electrolyte imbalances  - Administer electrolyte replacement as ordered  - Monitor response to electrolyte replacements, including repeat lab results as appropriate  - Instruct patient on fluid and nutrition as appropriate  Outcome: Progressing  Goal: Fluid balance maintained  Description: INTERVENTIONS:  - Monitor labs   - Monitor I/O and WT  - Instruct patient on fluid and nutrition as appropriate  - Assess for signs & symptoms of volume excess or deficit  Outcome: Progressing  Goal: Glucose maintained within target range  Description: INTERVENTIONS:  - Monitor Blood Glucose as ordered  - Assess for signs and symptoms of hyperglycemia and hypoglycemia  - Administer ordered medications to maintain glucose within target range  - Assess nutritional intake and initiate nutrition service referral as needed  Outcome: Progressing     Problem: MUSCULOSKELETAL - ADULT  Goal: Maintain or return mobility to safest level of function  Description: INTERVENTIONS:  - Assess patient's ability to carry out ADLs; assess patient's baseline for ADL function and identify physical deficits which impact ability to perform ADLs (bathing, care of mouth/teeth, toileting, grooming, dressing, etc.)  - Assess/evaluate cause of self-care deficits   - Assess range of motion  - Assess patient's mobility  - Assess patient's need for assistive devices and provide as appropriate  - Encourage maximum independence but intervene and supervise when necessary  - Involve family in performance of ADLs  - Assess for home care needs following discharge   - Consider OT  consult to assist with ADL evaluation and planning for discharge  - Provide patient education as appropriate  Outcome: Progressing     Problem: HEMATOLOGIC - ADULT  Goal: Maintains hematologic stability  Description: INTERVENTIONS  - Assess for signs and symptoms of bleeding or hemorrhage  - Monitor labs  - Administer supportive blood products/factors as ordered and appropriate  Outcome: Progressing     Problem: COPING  Goal: Pt/Family able to verbalize concerns and demonstrate effective coping strategies  Description: INTERVENTIONS:  - Assist patient/family to identify coping skills, available support systems and cultural and spiritual values  - Provide emotional support, including active listening and acknowledgement of concerns of patient and caregivers  - Reduce environmental stimuli, as able  - Provide patient education  - Assess for spiritual pain/suffering and initiate spiritual care, including notification of Pastoral Care or nat[place] based community as needed  - Assess effectiveness of coping strategies  Outcome: Progressing  Goal: Will report anxiety at manageable levels  Description: INTERVENTIONS:  - Administer medication as ordered  - Teach and encourage coping skills  - Provide emotional support  - Assess patient/family for anxiety and ability to cope  Outcome: Progressing     Problem: Potential for Falls  Goal: Patient will remain free of falls  Description: INTERVENTIONS:  - Educate patient/family on patient safety including physical limitations  - Instruct patient to call for assistance with activity   - Consult OT/PT to assist with strengthening/mobility   - Keep Call bell within reach  - Keep bed low and locked with side rails adjusted as appropriate  - Keep care items and personal belongings within reach  - Initiate and maintain comfort rounds  - Make Fall Risk Sign visible to staff  - Offer Toileting every 2 Hours, in advance of need  - Initiate/Maintain bed alarm  - Obtain necessary fall risk  management equipment: non skid footwear  - Apply yellow socks and bracelet for high fall risk patients  - Consider moving patient to room near nurses station  Outcome: Progressing

## 2024-03-02 LAB
ALBUMIN SERPL BCP-MCNC: 3.2 G/DL (ref 3.5–5)
ALP SERPL-CCNC: 66 U/L (ref 34–104)
ALT SERPL W P-5'-P-CCNC: 27 U/L (ref 7–52)
ANION GAP SERPL CALCULATED.3IONS-SCNC: 11 MMOL/L
ANION GAP SERPL CALCULATED.3IONS-SCNC: 11 MMOL/L
ANION GAP SERPL CALCULATED.3IONS-SCNC: 9 MMOL/L
ANION GAP SERPL CALCULATED.3IONS-SCNC: 9 MMOL/L
AST SERPL W P-5'-P-CCNC: 20 U/L (ref 13–39)
ATRIAL RATE: 104 BPM
ATRIAL RATE: 138 BPM
BACTERIA BLD CULT: NORMAL
BACTERIA BLD CULT: NORMAL
BASE EXCESS BLDA CALC-SCNC: 4 MMOL/L
BILIRUB SERPL-MCNC: 0.82 MG/DL (ref 0.2–1)
BUN SERPL-MCNC: 24 MG/DL (ref 5–25)
BUN SERPL-MCNC: 28 MG/DL (ref 5–25)
BUN SERPL-MCNC: 28 MG/DL (ref 5–25)
BUN SERPL-MCNC: 30 MG/DL (ref 5–25)
CALCIUM ALBUM COR SERPL-MCNC: 10.7 MG/DL (ref 8.3–10.1)
CALCIUM SERPL-MCNC: 10.1 MG/DL (ref 8.4–10.2)
CALCIUM SERPL-MCNC: 10.1 MG/DL (ref 8.4–10.2)
CALCIUM SERPL-MCNC: 10.9 MG/DL (ref 8.4–10.2)
CALCIUM SERPL-MCNC: 9.5 MG/DL (ref 8.4–10.2)
CHLORIDE SERPL-SCNC: 107 MMOL/L (ref 96–108)
CHLORIDE SERPL-SCNC: 107 MMOL/L (ref 96–108)
CHLORIDE SERPL-SCNC: 108 MMOL/L (ref 96–108)
CHLORIDE SERPL-SCNC: 108 MMOL/L (ref 96–108)
CO2 SERPL-SCNC: 29 MMOL/L (ref 21–32)
CO2 SERPL-SCNC: 29 MMOL/L (ref 21–32)
CO2 SERPL-SCNC: 30 MMOL/L (ref 21–32)
CO2 SERPL-SCNC: 31 MMOL/L (ref 21–32)
CREAT SERPL-MCNC: 0.47 MG/DL (ref 0.6–1.3)
CREAT SERPL-MCNC: 0.49 MG/DL (ref 0.6–1.3)
CREAT SERPL-MCNC: 0.51 MG/DL (ref 0.6–1.3)
CREAT SERPL-MCNC: 0.52 MG/DL (ref 0.6–1.3)
CRP SERPL QL: 46 MG/L
ERYTHROCYTE [DISTWIDTH] IN BLOOD BY AUTOMATED COUNT: 20 % (ref 11.6–15.1)
GFR SERPL CREATININE-BSD FRML MDRD: 105 ML/MIN/1.73SQ M
GFR SERPL CREATININE-BSD FRML MDRD: 106 ML/MIN/1.73SQ M
GFR SERPL CREATININE-BSD FRML MDRD: 107 ML/MIN/1.73SQ M
GFR SERPL CREATININE-BSD FRML MDRD: 109 ML/MIN/1.73SQ M
GLUCOSE SERPL-MCNC: 124 MG/DL (ref 65–140)
GLUCOSE SERPL-MCNC: 133 MG/DL (ref 65–140)
GLUCOSE SERPL-MCNC: 141 MG/DL (ref 65–140)
GLUCOSE SERPL-MCNC: 145 MG/DL (ref 65–140)
GLUCOSE SERPL-MCNC: 147 MG/DL (ref 65–140)
GLUCOSE SERPL-MCNC: 152 MG/DL (ref 65–140)
GLUCOSE SERPL-MCNC: 155 MG/DL (ref 65–140)
GLUCOSE SERPL-MCNC: 158 MG/DL (ref 65–140)
GLUCOSE SERPL-MCNC: 177 MG/DL (ref 65–140)
GLUCOSE SERPL-MCNC: 209 MG/DL (ref 65–140)
GLUCOSE SERPL-MCNC: 210 MG/DL (ref 65–140)
GLUCOSE SERPL-MCNC: 239 MG/DL (ref 65–140)
HCO3 BLDA-SCNC: 27.7 MMOL/L (ref 22–28)
HCT VFR BLD AUTO: 27 % (ref 34.8–46.1)
HGB BLD-MCNC: 8.9 G/DL (ref 11.5–15.4)
IPAP: 8
MAGNESIUM SERPL-MCNC: 1.9 MG/DL (ref 1.9–2.7)
MCH RBC QN AUTO: 30.8 PG (ref 26.8–34.3)
MCHC RBC AUTO-ENTMCNC: 33 G/DL (ref 31.4–37.4)
MCV RBC AUTO: 93 FL (ref 82–98)
NON VENT ROOM AIR: 50 %
NON VENT- BIPAP: ABNORMAL
O2 CT BLDA-SCNC: 12.9 ML/DL (ref 16–23)
OXYHGB MFR BLDA: 94.1 % (ref 94–97)
P AXIS: 62 DEGREES
P AXIS: 69 DEGREES
PCO2 BLDA: 37.9 MM HG (ref 36–44)
PEEP MAX SETTING VENT: 6 CM[H2O]
PH BLDA: 7.48 [PH] (ref 7.35–7.45)
PHOSPHATE SERPL-MCNC: 1.8 MG/DL (ref 2.7–4.5)
PLATELET # BLD AUTO: 115 THOUSANDS/UL (ref 149–390)
PMV BLD AUTO: 12.4 FL (ref 8.9–12.7)
PO2 BLDA: 77.6 MM HG (ref 75–129)
POTASSIUM SERPL-SCNC: 3.1 MMOL/L (ref 3.5–5.3)
POTASSIUM SERPL-SCNC: 3.6 MMOL/L (ref 3.5–5.3)
POTASSIUM SERPL-SCNC: 3.7 MMOL/L (ref 3.5–5.3)
POTASSIUM SERPL-SCNC: 4.1 MMOL/L (ref 3.5–5.3)
PR INTERVAL: 144 MS
PR INTERVAL: 144 MS
PROT SERPL-MCNC: 5.1 G/DL (ref 6.4–8.4)
QRS AXIS: 21 DEGREES
QRS AXIS: 36 DEGREES
QRSD INTERVAL: 66 MS
QRSD INTERVAL: 70 MS
QT INTERVAL: 262 MS
QT INTERVAL: 316 MS
QTC INTERVAL: 396 MS
QTC INTERVAL: 415 MS
RBC # BLD AUTO: 2.89 MILLION/UL (ref 3.81–5.12)
SARS-COV-2 AG UPPER RESP QL IA: ABNORMAL
SARS-COV-2 AG UPPER RESP QL IA: POSITIVE
SODIUM SERPL-SCNC: 147 MMOL/L (ref 135–147)
SODIUM SERPL-SCNC: 148 MMOL/L (ref 135–147)
T WAVE AXIS: 60 DEGREES
T WAVE AXIS: 66 DEGREES
VENTRICULAR RATE: 104 BPM
VENTRICULAR RATE: 138 BPM
WBC # BLD AUTO: 17.93 THOUSAND/UL (ref 4.31–10.16)

## 2024-03-02 PROCEDURE — 80048 BASIC METABOLIC PNL TOTAL CA: CPT

## 2024-03-02 PROCEDURE — 80053 COMPREHEN METABOLIC PANEL: CPT | Performed by: STUDENT IN AN ORGANIZED HEALTH CARE EDUCATION/TRAINING PROGRAM

## 2024-03-02 PROCEDURE — 94760 N-INVAS EAR/PLS OXIMETRY 1: CPT

## 2024-03-02 PROCEDURE — 94664 DEMO&/EVAL PT USE INHALER: CPT

## 2024-03-02 PROCEDURE — 87811 SARS-COV-2 COVID19 W/OPTIC: CPT | Performed by: INTERNAL MEDICINE

## 2024-03-02 PROCEDURE — 82948 REAGENT STRIP/BLOOD GLUCOSE: CPT

## 2024-03-02 PROCEDURE — 99233 SBSQ HOSP IP/OBS HIGH 50: CPT | Performed by: INTERNAL MEDICINE

## 2024-03-02 PROCEDURE — 84100 ASSAY OF PHOSPHORUS: CPT | Performed by: STUDENT IN AN ORGANIZED HEALTH CARE EDUCATION/TRAINING PROGRAM

## 2024-03-02 PROCEDURE — 80175 DRUG SCREEN QUAN LAMOTRIGINE: CPT

## 2024-03-02 PROCEDURE — 93010 ELECTROCARDIOGRAM REPORT: CPT | Performed by: INTERNAL MEDICINE

## 2024-03-02 PROCEDURE — 85027 COMPLETE CBC AUTOMATED: CPT | Performed by: STUDENT IN AN ORGANIZED HEALTH CARE EDUCATION/TRAINING PROGRAM

## 2024-03-02 PROCEDURE — 83735 ASSAY OF MAGNESIUM: CPT | Performed by: STUDENT IN AN ORGANIZED HEALTH CARE EDUCATION/TRAINING PROGRAM

## 2024-03-02 PROCEDURE — 93005 ELECTROCARDIOGRAM TRACING: CPT

## 2024-03-02 PROCEDURE — 94003 VENT MGMT INPAT SUBQ DAY: CPT

## 2024-03-02 PROCEDURE — 99291 CRITICAL CARE FIRST HOUR: CPT | Performed by: INTERNAL MEDICINE

## 2024-03-02 PROCEDURE — 82805 BLOOD GASES W/O2 SATURATION: CPT | Performed by: STUDENT IN AN ORGANIZED HEALTH CARE EDUCATION/TRAINING PROGRAM

## 2024-03-02 PROCEDURE — 86140 C-REACTIVE PROTEIN: CPT

## 2024-03-02 PROCEDURE — 94640 AIRWAY INHALATION TREATMENT: CPT

## 2024-03-02 RX ORDER — INSULIN LISPRO 100 [IU]/ML
1-6 INJECTION, SOLUTION INTRAVENOUS; SUBCUTANEOUS EVERY 6 HOURS SCHEDULED
Status: DISCONTINUED | OUTPATIENT
Start: 2024-03-02 | End: 2024-03-04

## 2024-03-02 RX ORDER — FUROSEMIDE 10 MG/ML
20 SYRINGE (ML) INJECTION CONTINUOUS
Status: DISCONTINUED | OUTPATIENT
Start: 2024-03-02 | End: 2024-03-02

## 2024-03-02 RX ORDER — HYDROMORPHONE HCL IN WATER/PF 6 MG/30 ML
0.2 PATIENT CONTROLLED ANALGESIA SYRINGE INTRAVENOUS ONCE
Status: DISCONTINUED | OUTPATIENT
Start: 2024-03-02 | End: 2024-03-02

## 2024-03-02 RX ORDER — FUROSEMIDE 10 MG/ML
20 INJECTION INTRAMUSCULAR; INTRAVENOUS ONCE
Status: COMPLETED | OUTPATIENT
Start: 2024-03-02 | End: 2024-03-02

## 2024-03-02 RX ORDER — POTASSIUM CHLORIDE 20MEQ/15ML
40 LIQUID (ML) ORAL ONCE
Status: DISCONTINUED | OUTPATIENT
Start: 2024-03-02 | End: 2024-03-02

## 2024-03-02 RX ORDER — LABETALOL HYDROCHLORIDE 5 MG/ML
10 INJECTION, SOLUTION INTRAVENOUS EVERY 4 HOURS PRN
Status: DISCONTINUED | OUTPATIENT
Start: 2024-03-02 | End: 2024-03-03

## 2024-03-02 RX ORDER — MAGNESIUM SULFATE HEPTAHYDRATE 40 MG/ML
2 INJECTION, SOLUTION INTRAVENOUS ONCE
Status: COMPLETED | OUTPATIENT
Start: 2024-03-02 | End: 2024-03-02

## 2024-03-02 RX ADMIN — GABAPENTIN 100 MG: 250 SOLUTION ORAL at 16:10

## 2024-03-02 RX ADMIN — Medication 2 SPRAY: at 18:15

## 2024-03-02 RX ADMIN — REMDESIVIR 200 MG: 100 INJECTION, POWDER, LYOPHILIZED, FOR SOLUTION INTRAVENOUS at 16:57

## 2024-03-02 RX ADMIN — IPRATROPIUM BROMIDE 0.5 MG: 0.5 SOLUTION RESPIRATORY (INHALATION) at 07:56

## 2024-03-02 RX ADMIN — METHYLPREDNISOLONE SODIUM SUCCINATE 125 MG: 125 INJECTION, POWDER, FOR SOLUTION INTRAMUSCULAR; INTRAVENOUS at 08:28

## 2024-03-02 RX ADMIN — DEXMEDETOMIDINE HYDROCHLORIDE 0.5 MCG/KG/HR: 4 INJECTION, SOLUTION INTRAVENOUS at 07:41

## 2024-03-02 RX ADMIN — NIRMATRELVIR AND RITONAVIR 3 TABLET: KIT at 18:13

## 2024-03-02 RX ADMIN — METHYLPREDNISOLONE SODIUM SUCCINATE 125 MG: 125 INJECTION, POWDER, FOR SOLUTION INTRAMUSCULAR; INTRAVENOUS at 20:14

## 2024-03-02 RX ADMIN — POTASSIUM & SODIUM PHOSPHATES POWDER PACK 280-160-250 MG 2 PACKET: 280-160-250 PACK at 08:29

## 2024-03-02 RX ADMIN — IPRATROPIUM BROMIDE 0.5 MG: 0.5 SOLUTION RESPIRATORY (INHALATION) at 14:31

## 2024-03-02 RX ADMIN — INSULIN LISPRO 3 UNITS: 100 INJECTION, SOLUTION INTRAVENOUS; SUBCUTANEOUS at 13:53

## 2024-03-02 RX ADMIN — VENLAFAXINE 25 MG: 25 TABLET ORAL at 08:30

## 2024-03-02 RX ADMIN — OLANZAPINE 5 MG: 5 TABLET, ORALLY DISINTEGRATING ORAL at 12:36

## 2024-03-02 RX ADMIN — GABAPENTIN 100 MG: 250 SOLUTION ORAL at 08:28

## 2024-03-02 RX ADMIN — FAMOTIDINE 20 MG: 20 TABLET, FILM COATED ORAL at 18:13

## 2024-03-02 RX ADMIN — ACETAMINOPHEN 1000 MG: 10 INJECTION INTRAVENOUS at 04:25

## 2024-03-02 RX ADMIN — IPRATROPIUM BROMIDE 0.5 MG: 0.5 SOLUTION RESPIRATORY (INHALATION) at 20:06

## 2024-03-02 RX ADMIN — HYDROMORPHONE HYDROCHLORIDE 0.2 MG: 0.2 INJECTION, SOLUTION INTRAMUSCULAR; INTRAVENOUS; SUBCUTANEOUS at 20:52

## 2024-03-02 RX ADMIN — LEVALBUTEROL HYDROCHLORIDE 1.25 MG: 1.25 SOLUTION RESPIRATORY (INHALATION) at 07:56

## 2024-03-02 RX ADMIN — ENOXAPARIN SODIUM 40 MG: 40 INJECTION SUBCUTANEOUS at 08:28

## 2024-03-02 RX ADMIN — MAGNESIUM SULFATE HEPTAHYDRATE 2 G: 40 INJECTION, SOLUTION INTRAVENOUS at 10:57

## 2024-03-02 RX ADMIN — VENLAFAXINE 25 MG: 25 TABLET ORAL at 16:11

## 2024-03-02 RX ADMIN — TOPIRAMATE 100 MG: 100 TABLET, FILM COATED ORAL at 20:14

## 2024-03-02 RX ADMIN — POLYETHYLENE GLYCOL 3350 17 G: 17 POWDER, FOR SOLUTION ORAL at 08:29

## 2024-03-02 RX ADMIN — LAMOTRIGINE 150 MG: 100 TABLET ORAL at 08:29

## 2024-03-02 RX ADMIN — LEVALBUTEROL HYDROCHLORIDE 1.25 MG: 1.25 SOLUTION RESPIRATORY (INHALATION) at 20:06

## 2024-03-02 RX ADMIN — Medication 2 SPRAY: at 13:58

## 2024-03-02 RX ADMIN — ACETAMINOPHEN 1000 MG: 10 INJECTION INTRAVENOUS at 11:42

## 2024-03-02 RX ADMIN — SENNOSIDES 8.6 MG: 8.6 TABLET, FILM COATED ORAL at 08:29

## 2024-03-02 RX ADMIN — FAMOTIDINE 20 MG: 20 TABLET, FILM COATED ORAL at 08:29

## 2024-03-02 RX ADMIN — CHLORHEXIDINE GLUCONATE 0.12% ORAL RINSE 15 ML: 1.2 LIQUID ORAL at 08:28

## 2024-03-02 RX ADMIN — NYSTATIN: 100000 POWDER TOPICAL at 08:30

## 2024-03-02 RX ADMIN — ACETAMINOPHEN 1000 MG: 10 INJECTION INTRAVENOUS at 18:13

## 2024-03-02 RX ADMIN — TOPIRAMATE 100 MG: 100 TABLET, FILM COATED ORAL at 08:28

## 2024-03-02 RX ADMIN — FUROSEMIDE 20 MG: 10 INJECTION, SOLUTION INTRAMUSCULAR; INTRAVENOUS at 10:58

## 2024-03-02 RX ADMIN — NIRMATRELVIR AND RITONAVIR 3 TABLET: KIT at 13:07

## 2024-03-02 RX ADMIN — GABAPENTIN 100 MG: 250 SOLUTION ORAL at 20:13

## 2024-03-02 RX ADMIN — SENNOSIDES 8.6 MG: 8.6 TABLET, FILM COATED ORAL at 18:13

## 2024-03-02 RX ADMIN — HYDROMORPHONE HYDROCHLORIDE 0.2 MG: 0.2 INJECTION, SOLUTION INTRAMUSCULAR; INTRAVENOUS; SUBCUTANEOUS at 04:25

## 2024-03-02 RX ADMIN — VENLAFAXINE 25 MG: 25 TABLET ORAL at 11:42

## 2024-03-02 RX ADMIN — NYSTATIN: 100000 POWDER TOPICAL at 18:15

## 2024-03-02 RX ADMIN — DEXMEDETOMIDINE HYDROCHLORIDE 0.3 MCG/KG/HR: 4 INJECTION, SOLUTION INTRAVENOUS at 16:59

## 2024-03-02 RX ADMIN — CHLORHEXIDINE GLUCONATE 0.12% ORAL RINSE 15 ML: 1.2 LIQUID ORAL at 20:13

## 2024-03-02 RX ADMIN — LAMOTRIGINE 150 MG: 100 TABLET ORAL at 18:13

## 2024-03-02 RX ADMIN — LEVALBUTEROL HYDROCHLORIDE 1.25 MG: 1.25 SOLUTION RESPIRATORY (INHALATION) at 14:31

## 2024-03-02 RX ADMIN — INSULIN LISPRO 2 UNITS: 100 INJECTION, SOLUTION INTRAVENOUS; SUBCUTANEOUS at 18:30

## 2024-03-02 NOTE — PLAN OF CARE
Problem: Prexisting or High Potential for Compromised Skin Integrity  Goal: Skin integrity is maintained or improved  Description: INTERVENTIONS:  - Identify patients at risk for skin breakdown  - Assess and monitor skin integrity  - Assess and monitor nutrition and hydration status  - Monitor labs   - Assess for incontinence   - Turn and reposition patient  - Assist with mobility/ambulation  - Relieve pressure over bony prominences  - Avoid friction and shearing  - Provide appropriate hygiene as needed including keeping skin clean and dry  - Evaluate need for skin moisturizer/barrier cream  - Collaborate with interdisciplinary team   - Patient/family teaching  - Consider wound care consult   Outcome: Progressing     Problem: Nutrition/Hydration-ADULT  Goal: Nutrient/Hydration intake appropriate for improving, restoring or maintaining nutritional needs  Description: Monitor and assess patient's nutrition/hydration status for malnutrition. Collaborate with interdisciplinary team and initiate plan and interventions as ordered.  Monitor patient's weight and dietary intake as ordered or per policy. Utilize nutrition screening tool and intervene as necessary. Determine patient's food preferences and provide high-protein, high-caloric foods as appropriate.     INTERVENTIONS:  - Monitor oral intake, urinary output, labs, and treatment plans  - Assess nutrition and hydration status and recommend course of action  - Evaluate amount of meals eaten  - Assist patient with eating if necessary   - Allow adequate time for meals  - Recommend/ encourage appropriate diets, oral nutritional supplements, and vitamin/mineral supplements  - Order, calculate, and assess calorie counts as needed  - Recommend, monitor, and adjust tube feedings and TPN/PPN based on assessed needs  - Assess need for intravenous fluids  - Provide specific nutrition/hydration education as appropriate  - Include patient/family/caregiver in decisions related to  nutrition  Outcome: Progressing     Problem: SAFETY,RESTRAINT: NV/NON-SELF DESTRUCTIVE BEHAVIOR  Goal: Remains free of harm/injury (restraint for non violent/non self-detsructive behavior)  Description: INTERVENTIONS:  - Instruct patient/family regarding restraint use   - Assess and monitor physiologic and psychological status   - Provide interventions and comfort measures to meet assessed patient needs   - Identify and implement measures to help patient regain control  - Assess readiness for release of restraint   Outcome: Progressing  Goal: Returns to optimal restraint-free functioning  Description: INTERVENTIONS:  - Assess the patient's behavior and symptoms that indicate continued need for restraint  - Identify and implement measures to help patient regain control  - Assess readiness for release of restraint   Outcome: Progressing     Problem: INFECTION - ADULT  Goal: Absence or prevention of progression during hospitalization  Description: INTERVENTIONS:  - Assess and monitor for signs and symptoms of infection  - Monitor lab/diagnostic results  - Monitor all insertion sites, i.e. indwelling lines, tubes, and drains  - Monitor endotracheal if appropriate and nasal secretions for changes in amount and color  - [place] appropriate cooling/warming therapies per order  - Administer medications as ordered  - Instruct and encourage patient and family to use good hand hygiene technique  - Identify and instruct in appropriate isolation precautions for identified infection/condition  Outcome: Progressing     Problem: SAFETY ADULT  Goal: Patient will remain free of falls  Description: INTERVENTIONS:  - Educate patient/family on patient safety including physical limitations  - Instruct patient to call for assistance with activity   - Consult OT/PT to assist with strengthening/mobility   - Keep Call bell within reach  - Keep bed low and locked with side rails adjusted as appropriate  - Keep care items and personal  belongings within reach  - Initiate and maintain comfort rounds  - Make Fall Risk Sign visible to staff  - Offer Toileting every 2 Hours, in advance of need  - Initiate/Maintain bed alarm  - Obtain necessary fall risk management equipment: non skid footwear  - Apply yellow socks and bracelet for high fall risk patients  - Consider moving patient to room near nurses station  Outcome: Progressing     Problem: RESPIRATORY - ADULT  Goal: Achieves optimal ventilation and oxygenation  Description: INTERVENTIONS:  - Assess for changes in respiratory status  - Assess for changes in mentation and behavior  - Position to facilitate oxygenation and minimize respiratory effort  - Oxygen administered by appropriate delivery if ordered  - Initiate smoking cessation education as indicated  - Encourage broncho-pulmonary hygiene including cough, deep breathe, Incentive Spirometry  - Assess the need for suctioning and aspirate as needed  - Assess and instruct to report SOB or any respiratory difficulty  - Respiratory Therapy support as indicated  Outcome: Progressing     Problem: SKIN/TISSUE INTEGRITY - ADULT  Goal: Skin Integrity remains intact(Skin Breakdown Prevention)  Description: Assess:  -Perform Flavio assessment every shift   -Clean and moisturize skin every day   -Inspect skin when repositioning, toileting, and assisting with ADLS  -Assess under medical devices such as ronny  every hour   -Assess extremities for adequate circulation and sensation     Bed Management:  -Have minimal linens on bed & keep smooth, unwrinkled  -Change linens as needed when moist or perspiring  -Avoid sitting or lying in one position for more than 2 hours while in bed  -Keep HOB at 45 degrees     Toileting:  -Offer bedside commode  -Assess for incontinence every hour  -Use incontinent care products after each incontinent episode such as moisture barrier cream     Activity:  -Mobilize patient 3 times a day  -Encourage activity and walks on  unit  -Encourage or provide ROM exercises   -Turn and reposition patient every 2 Hours  -Use appropriate equipment to lift or move patient in bed  -Instruct/ Assist with weight shifting every hour when out of bed in chair  -Consider limitation of chair time 2 hour intervals    Skin Care:  -Avoid use of baby powder, tape, friction and shearing, hot water or constrictive clothing  -Relieve pressure over bony prominences using allevyn   -Do not massage red bony areas    Next Steps:  -Teach patient strategies to minimize risks such as weight shifting    -Consider consults to  interdisciplinary teams such as PT  Outcome: Progressing  Goal: Incision(s), wounds(s) or drain site(s) healing without S/S of infection  Description: INTERVENTIONS  - Assess and document dressing, incision, wound bed, drain sites and surrounding tissue  - Provide patient and family education  - Perform skin care/dressing changes  Outcome: Progressing  Goal: Pressure injury heals and does not worsen  Description: Interventions:  - Implement low air loss mattress or specialty surface (Criteria met)  - Apply silicone foam dressing  - Instruct/assist with weight shifting every 90 minutes when in chair   - Limit chair time to 2 hour intervals  - Use special pressure reducing interventions  when in chair   - Apply fecal or urinary incontinence containment device   - Perform passive or active ROM   - Turn and reposition patient & offload bony prominences 2 hours   - Utilize friction reducing device or surface for transfers   - Consider consults to  interdisciplinary teams   - Use incontinent care products after each incontinent episode   - Consider nutrition services referral as needed  Outcome: Progressing     Problem: NEUROSENSORY - ADULT  Goal: Achieves stable or improved neurological status  Description: INTERVENTIONS  - Monitor and report changes in neurological status  - Monitor vital signs such as temperature, blood pressure, glucose, and any  other labs ordered   - Initiate measures to prevent increased intracranial pressure  - Monitor for seizure activity and implement precautions if appropriate      Outcome: Progressing  Goal: Achieves maximal functionality and self care  Description: INTERVENTIONS  - Monitor swallowing and airway patency with patient fatigue and changes in neurological status  - Encourage and assist patient to increase activity and self care.   - Encourage visually impaired, hearing impaired and aphasic patients to use assistive/communication devices  Outcome: Progressing     Problem: CARDIOVASCULAR - ADULT  Goal: Maintains optimal cardiac output and hemodynamic stability  Description: INTERVENTIONS:  - Monitor I/O, vital signs and rhythm  - Monitor for S/S and trends of decreased cardiac output  - Administer and titrate ordered vasoactive medications to optimize hemodynamic stability  - Assess quality of pulses, skin color and temperature  - Assess for signs of decreased coronary artery perfusion  - Instruct patient to report change in severity of symptoms  Outcome: Progressing  Goal: Absence of cardiac dysrhythmias or at baseline rhythm  Description: INTERVENTIONS:  - Continuous cardiac monitoring, vital signs, obtain 12 lead EKG if ordered  - Administer antiarrhythmic and heart rate control medications as ordered  - Monitor electrolytes and administer replacement therapy as ordered  Outcome: Progressing     Problem: GASTROINTESTINAL - ADULT  Goal: Minimal or absence of nausea and/or vomiting  Description: INTERVENTIONS:  - Administer IV fluids if ordered to ensure adequate hydration  - Maintain NPO status until nausea and vomiting are resolved  - Nasogastric tube if ordered  - Administer ordered antiemetic medications as needed  - Provide nonpharmacologic comfort measures as appropriate  - Advance diet as tolerated, if ordered  - Consider nutrition services referral to assist patient with adequate nutrition and appropriate food  choices  Outcome: Progressing  Goal: Maintains or returns to baseline bowel function  Description: INTERVENTIONS:  - Assess bowel function  - Encourage oral fluids to ensure adequate hydration  - Administer IV fluids if ordered to ensure adequate hydration  - Administer ordered medications as needed  - Encourage mobilization and activity  - Consider nutritional services referral to assist patient with adequate nutrition and appropriate food choices  Outcome: Progressing  Goal: Maintains adequate nutritional intake  Description: INTERVENTIONS:  - Monitor percentage of each meal consumed  - Identify factors contributing to decreased intake, treat as appropriate  - Assist with meals as needed  - Monitor I&O, weight, and lab values if indicated  - Obtain nutrition services referral as needed  Outcome: Progressing  Goal: Establish and maintain optimal ostomy function  Description: INTERVENTIONS:  - Assess bowel function  - Encourage oral fluids to ensure adequate hydration  - Administer IV fluids if ordered to ensure adequate hydration   - Administer ordered medications as needed  - Encourage mobilization and activity  - Nutrition services referral to assist patient with appropriate food choices  - Assess stoma site  - Consider wound care consult   Outcome: Progressing  Goal: Oral mucous membranes remain intact  Description: INTERVENTIONS  - Assess oral mucosa and hygiene practices  - Implement preventative oral hygiene regimen  - Implement oral medicated treatments as ordered  - Initiate Nutrition services referral as needed  Outcome: Progressing     Problem: GENITOURINARY - ADULT  Goal: Maintains or returns to baseline urinary function  Description: INTERVENTIONS:  - Assess urinary function  - Encourage oral fluids to ensure adequate hydration if ordered  - Administer IV fluids as ordered to ensure adequate hydration  - Administer ordered medications as needed  - Offer frequent toileting  - Follow urinary retention  protocol if ordered  Outcome: Progressing  Goal: Urinary catheter remains patent  Description: INTERVENTIONS:  - Assess patency of urinary catheter  - If patient has a chronic marie, consider changing catheter if non-functioning  - Follow guidelines for intermittent irrigation of non-functioning urinary catheter  Outcome: Progressing     Problem: HEMATOLOGIC - ADULT  Goal: Maintains hematologic stability  Description: INTERVENTIONS  - Assess for signs and symptoms of bleeding or hemorrhage  - Monitor labs  - Administer supportive blood products/factors as ordered and appropriate  Outcome: Progressing     Problem: MUSCULOSKELETAL - ADULT  Goal: Maintain or return mobility to safest level of function  Description: INTERVENTIONS:  - Assess patient's ability to carry out ADLs; assess patient's baseline for ADL function and identify physical deficits which impact ability to perform ADLs (bathing, care of mouth/teeth, toileting, grooming, dressing, etc.)  - Assess/evaluate cause of self-care deficits   - Assess range of motion  - Assess patient's mobility  - Assess patient's need for assistive devices and provide as appropriate  - Encourage maximum independence but intervene and supervise when necessary  - Involve family in performance of ADLs  - Assess for home care needs following discharge   - Consider OT consult to assist with ADL evaluation and planning for discharge  - Provide patient education as appropriate  Outcome: Progressing     Problem: COPING  Goal: Pt/Family able to verbalize concerns and demonstrate effective coping strategies  Description: INTERVENTIONS:  - Assist patient/family to identify coping skills, available support systems and cultural and spiritual values  - Provide emotional support, including active listening and acknowledgement of concerns of patient and caregivers  - Reduce environmental stimuli, as able  - Provide patient education  - Assess for spiritual pain/suffering and initiate  spiritual care, including notification of Pastoral Care or [place] based community as needed  - Assess effectiveness of coping strategies  Outcome: Progressing  Goal: Will report anxiety at manageable levels  Description: INTERVENTIONS:  - Administer medication as ordered  - Teach and encourage coping skills  - Provide emotional support  - Assess patient/family for anxiety and ability to cope  Outcome: Progressing     Problem: BEHAVIOR  Goal: Pt/Family maintain appropriate behavior and adhere to behavioral management agreement, if implemented  Description: INTERVENTIONS:  - Assess the family dynamic   - Encourage verbalization of thoughts and concerns in a socially appropriate manner  - Assess patient/family's coping skills and non-compliant behavior (including use of illegal substances).  - Utilize positive, consistent limit setting strategies supporting safety of patient, staff and others  - Initiate consult with Case Management, Spiritual Care or other ancillary services as appropriate  - If a patient's/visitor's behavior jeopardizes the safety of the patient, staff, or others, refer to organization procedure.   - Notify Security of behavior or suspected illegal substances which indicate the need for search of the patient and/or belongings  - Encourage participation in the decision making process about a behavioral management agreement; implement if patient meets criteria  Outcome: Progressing     Problem: Potential for Falls  Goal: Patient will remain free of falls  Description: INTERVENTIONS:  - Educate patient/family on patient safety including physical limitations  - Instruct patient to call for assistance with activity   - Consult OT/PT to assist with strengthening/mobility   - Keep Call bell within reach  - Keep bed low and locked with side rails adjusted as appropriate  - Keep care items and personal belongings within reach  - Initiate and maintain comfort rounds  - Make Fall Risk Sign visible to staff  -  Offer Toileting every 2 Hours, in advance of need  - Initiate/Maintain bed alarm  - Obtain necessary fall risk management equipment: non skid footwear  - Apply yellow socks and bracelet for high fall risk patients  - Consider moving patient to room near nurses station  Outcome: Progressing

## 2024-03-02 NOTE — RESPIRATORY THERAPY NOTE
Resp care   03/02/24 0801   Respiratory Assessment   Resp Comments pt founf on bipap 14/6, rr12, 50%, peep 6 bs are diminished, udn tx given, will cont to monitor per resp protocol.   Non-Invasive Information   O2 Interface Device Face mask   Non-Invasive Ventilation Mode BiPAP   $ Continous NIV Subsequent   $ Pulse Oximetry Spot Check Charge Completed   Non-Invasive Settings   FiO2 (%) 50   IPAP (cm) 14 cm   EPAP (cm) 6 cm   Rate (Set) 15   Pressure Support (cm H2O) 8   Trigger Sensitivity Flow (lpm) 3   Inspiratory Time (Set) 0.9   Humidification 31   Non-Invasive Readings   Heater Temperature (Obs) 31   Skin Intervention Skin intact   Total Rate 33   Vt (mL) (Mech) 533   MAP (Obs) 8   MV (Mech) 13.2   Peak Pressure (Obs) 21   Spontaneous Vt (mL) 379   Non-Invasive Alarms   Insp Pressure High (cm H20) 45   Insp Pressure Low (cm H20) 5   Low Insp Pressure Time (sec) 5 sec   MV High (L/min) 25   MV Low (L/min) 3   Vt High (mL) 1100   Vt Low (mL) 250   High Resp Rate (BPM) 45 BPM   Low Resp Rate (BPM) 5 BPM   Apnea Interval (sec) 20   Apnea Rate 12   Maintenance   Water bag changed No

## 2024-03-02 NOTE — PROGRESS NOTES
Progress Note - Infectious Disease   Carla Hunt 59 y.o. female MRN: [id number]  [facility] [id number]      Impression/Recommendations:  SIRS  versus sepsis.  Fluctuating fever, WBC.  Fevers may be multifactorial due to COVID (still positive antigen) versus inflammation (seem to correlate to steroid dosing).  Recent bump in WBC may be due to intensification of steroid dose.  Consider also other nosocomial infections.  Just finished antibiotics for pneumonia.  Recent blood cultures negative  Continue to follow closely off antibiotics  Steroids, antivirals as below  If high fevers continue, recheck blood cultures    Recent bacterial pneumonia.  Multiple course of treatment over hospitalization.  Most recent BAL culture with Pseudomonas and stenotrophomonas.  Unclear if pathogens or colonizers.  Status post 10 days levofloxacin.    Observe off further antibiotic.    Monitor temperature/WBC.  Monitor respiratory status.  Follow-up repeat blood cultures.     Severe COVID, present on admission.  Patient was treated with a 10-day course of remdesivir, dexamethasone and was given 1 dose of Tocilizumab on admission.  COVID antigen was negative prior to coming off isolation.  HAd positive COVID culture from BAL 2/19, status post another 5-day course of remdesivir.  Patient has remained on systemic corticosteroid throughout her hospitalization which were recently intensified 2/26 for fevers, now weaning again.  Persistent fevers, hypoxia.  COVID antigen today positive.  Start remdesevir/paxlovid per persistent COVID guidelines  Follow up repeat PCR - if positive would have law run cycle threshold     Acute hypoxic respiratory failure, likely multifactorial, with both COVID and bacterial pneumonia contributing.  Also consider persistent inflammation, fibrosis as seems quite steroid responsive.  Most recently extubated 3/1.  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  Patient is status post exploratory laparotomy with ileocecectomy and end ileostomy creation.  Surgery follow-up.     B-cell lymphoma, on maintenance rituxan, which is currently postponed.       I discussed with Dr. Mar the above plan to restart antivirals.  She agrees with the plan.     Antibiotics:  Off antibiotic    Subjective/24 Hour Events:  Patient seen on AM rounds.  Family at bedside helps provide independent history due to lethargy.  On HFNC 75%.  POC antigen testing positive today.      Objective:  Vitals:  Temp:  [99.3 °F (37.4 °C)-101.8 °F (38.8 °C)] 99.9 °F (37.7 °C)  HR:  [] 105  Resp:  [25-40] 26  BP: (115-164)/(62-93) 133/73  SpO2:  [88 %-98 %] 91 %  Temp (24hrs), Av.3 °F (37.9 °C), Min:99.3 °F (37.4 °C), Max:101.8 °F (38.8 °C)  Current: Temperature: 99.9 °F (37.7 °C)    Physical Exam:   General:  No acute distress  Psychiatric:  Lethargic  Pulmonary:  Normal respiratory excursion without accessory muscle use  Abdomen:  Soft, nontender  Extremities:  No edema  Skin:  No rashes    Lab Results:  I have personally reviewed pertinent labs.  Results from last 7 days   Lab Units 24  0510 24  0423 24  0006 24  0437 24  1446 24  0130 24  2301 24  2300   SODIUM mmol/L 148* 147 147   < > 154*   < > 155*  --    POTASSIUM mmol/L 3.7 3.6 4.1   < > 5.3   < > 3.9  --    CHLORIDE mmol/L 108 108 107   < > 114*   < > 108  --    CO2 mmol/L 29 30 31   < > 29   < > 39*  --    CO2, I-STAT mmol/L  --   --   --   --   --   --   --  38*   BUN mg/dL 28* 28* 30*   < > 33*   < > 32*  --    CREATININE mg/dL 0.51* 0.52* 0.49*   < > 0.64   < > 0.62  --    EGFR ml/min/1.73sq m 106 105 107   < > 98   < > 99  --    GLUCOSE, ISTAT mg/dl  --   --   --   --   --   --   --  176*   CALCIUM mg/dL 10.1 10.1 10.9*   < > 9.5   < > 9.9  --    AST U/L 20  --   --   --  11*  --  10*  --    ALT U/L 27  --   --    --  16  --  17  --    ALK PHOS U/L 66  --   --   --  63  --  68  --     < > = values in this interval not displayed.     Results from last 7 days   Lab Units 03/02/24  0443 03/01/24  0538 02/29/24  1541 02/27/24  0946 02/27/24  0437   WBC Thousand/uL 17.93* 11.69* 11.64*   < > 7.67   HEMOGLOBIN g/dL 8.9* 7.8* 7.5*   < > 7.3*   PLATELETS Thousands/uL 115* 89*  --   --  104*    < > = values in this interval not displayed.     Results from last 7 days   Lab Units 02/26/24  1251 02/26/24  1144   BLOOD CULTURE  No Growth After 4 Days. No Growth After 4 Days.       Imaging Studies:   I have personally reviewed pertinent imaging study reports and images in PACS.  CT chest 2/26 images reviewed personally diffuse GG opacities with more focal consolidations at the bases    EKG, Pathology, and Other Studies:   I have personally reviewed pertinent reports.

## 2024-03-02 NOTE — UTILIZATION REVIEW
Continued Stay Review    Date: 3/2/24                        Current Patient Class:  inpatient  Current Level of Care:  Critical Care     HPI:59 y.o. female initially admitted on 1/10/24    Assessment/Plan:  Extubated on 3/1.   Was tried on mid flow postextubation will put on BiPAP for increased work of breathing.  She was put on Precedex and Dilaudid to better manage possible withdrawals.  Lamictal & topamax for seizure disorder. Anxiety meds, Shock - systolics 's. Levo gtt off, on vasopressin intermittently. Maps >65 mmHg  Acute hypoxic respiratory failure due to severe covid  and covid induced fibrosis  -Tested COVID positive on 1/7  -Completed severe COVID pathway and 7 days CTX  -Tenuous respiratory status requiring multiple re-admissions to MICU  -S/p Bronch 2/2 and 02/16  -NG on cultures (initially showed non-group A strep)  -PCP and AFB negative   -Legionella, viral, fungal cultures no growth to date  -Pulse dose steroids with solumedrol 1g daily x3 days (completed 2/7) then transitioned to prednisone 80mg daily on 2/8  -Off veletri  -Completed IVIG for 5 days  -CT scan 2/24: general improvement of areas of consolidation and some areas of groundglass opacification on the lungs, still with significant groundglass opacification especially in the lower lobes. Bilateral consolidations in lower lobes. No evidence of PE.  -Currently on solumedrol 125 mg every 12 hours  -Planning for CRP and D-dimer every 48 hours  -Extubated to mid flow transition to BiPAP for increased work of breathing.  On insulin gtt  S/p 1 unit of PRBC's  on 3/1            Vital Signs:   Date/Time Temp Pulse Resp BP MAP (mmHg) Arterial Line BP MAP SpO2 FiO2 (%) Calculated FIO2 (%) - Nasal Cannula O2 Flow Rate (L/min) Nasal Cannula O2 Flow Rate (L/min) O2 Device O2 Interface Device Patient Position - Orthostatic VS   03/02/24 0801 -- -- -- -- -- -- -- -- -- -- -- -- -- Face mask --   03/02/24 0800 100.1 °F (37.8 °C) 99 32 Abnormal   127/72 93 144/53 77 mmHg 94 % -- -- -- -- -- -- --   03/02/24 0700 -- 107 Abnormal  26 Abnormal  125/69 91 -- -- 94 % -- -- -- -- -- -- --   03/02/24 0600 101.5 °F (38.6 °C) Abnormal  125 Abnormal  30 Abnormal  133/73 97 137/57 81 mmHg 95 % -- -- -- -- -- -- --   03/02/24 0500 -- 117 Abnormal  30 Abnormal  120/65 85 -- -- 96 % -- -- -- -- -- -- --   03/02/24 0400 101.8 °F (38.8 °C) Abnormal  120 Abnormal  40 Abnormal  140/72 98 149/59 82 mmHg 92 % 50 -- -- -- BiPAP -- Lying   03/02/24 0332 -- -- -- -- -- -- -- 94 % -- -- -- -- -- Face mask --   03/02/24 0300 -- 115 Abnormal  34 Abnormal  127/75 95 147/57 79 mmHg 93 % -- -- -- -- -- -- --   03/02/24 0200 -- 131 Abnormal  28 Abnormal  164/81 110 173/70 99 mmHg 93 % -- -- -- -- -- -- --   03/02/24 0100 -- 111 Abnormal  32 Abnormal  129/75 97 171/63 90 mmHg 96 % -- -- -- -- -- -- --   03/02/24 0000 100.1 °F (37.8 °C) 99 30 Abnormal  118/62 84 137/54 78 mmHg 90 % 50 -- -- -- BiPAP -- Lying   03/01/24 2300 -- 105 30 Abnormal  123/62 87 -- -- 90 % -- -- -- -- -- -- --   03/01/24 2200 -- 134 Abnormal  35 Abnormal  118/65 86 110/49 68 mmHg 94 % -- -- -- -- -- -- --   03/01/24 2100 99.6 °F (37.6 °C) 141 Abnormal  33 Abnormal  160/93 119 -- -- 94 % -- -- -- -- -- -- --   03/01/24 2050 -- -- -- -- -- -- -- 94 % -- -- -- -- -- Face mask --   03/01/24 2027 -- -- -- -- -- -- -- 93 % -- -- -- -- -- Face mask --   03/01/24 2015 -- -- -- -- -- -- -- 88 % Abnormal  -- -- -- -- -- HFNC prongs --   03/01/24 2000 -- 135 Abnormal  36 Abnormal  145/91 110 166/72 99 mmHg 89 % Abnormal  50 -- -- -- High flow nasal cannula -- Lying   03/01/24 1900 -- 124 Abnormal  27 Abnormal  135/82 103 161/66 92 mmHg 96 % -- -- -- -- -- -- --   03/01/24 1800 -- 125 Abnormal  38 Abnormal  138/81 105 169/66 94 mmHg 95 % -- -- -- -- -- -- --   03/01/24 1700 -- 112 Abnormal  32 Abnormal  125/69 92 162/64 91 mmHg 93 % -- -- -- -- -- -- --   03/01/24 1651 -- -- -- -- -- -- -- 93 % 50 220 50 L/min 50 L/min High  flow nasal cannula HFNC prongs --   03/01/24 1600 99.3 °F (37.4 °C) 133 Abnormal  30 Abnormal  127/68 90 152/70 93 mmHg 92 % -- -- -- -- BiPAP -- Lying       Pertinent Labs/Diagnostic Results:       Results from last 7 days   Lab Units 03/02/24  0443 03/01/24  0538 02/29/24  1541 02/29/24  0552 02/29/24  0429 02/27/24  0946 02/27/24  0437 02/26/24  1446 02/25/24 2300 02/25/24  0419   WBC Thousand/uL 17.93* 11.69* 11.64*  --  10.82*   < > 7.67 14.26*  --  8.19   HEMOGLOBIN g/dL 8.9* 7.8* 7.5* 6.3* 6.4*   < > 7.3* 10.0*  --  10.0*   I STAT HEMOGLOBIN   --   --   --   --   --   --   --   --    < >  --    HEMATOCRIT % 27.0* 25.5* 24.2* 21.3* 21.5*   < > 24.5* 32.1*  --  31.3*   HEMATOCRIT, ISTAT   --   --   --   --   --   --   --   --    < >  --    PLATELETS Thousands/uL 115* 89*  --   --   --   --  104* 139*  --  103*   BANDS PCT %  --  3  --   --   --   --   --  13*  --  8    < > = values in this interval not displayed.         Results from last 7 days   Lab Units 03/02/24  0510 03/02/24  0510 03/02/24  0423 03/02/24  0006 03/01/24  1614 03/01/24  0538 02/29/24  1753 02/29/24  0429 02/27/24  1939 02/27/24  0437 02/26/24  0130 02/25/24  2301 02/25/24  2300 02/25/24  0844 02/25/24  0419   SODIUM mmol/L 148*  --  147 147 143 142   < > 151*   < > 153*   < > 155*  --    < > 149*   POTASSIUM mmol/L 3.7  --  3.6 4.1 3.3* 4.0   < > 3.2*   < > 3.5   < > 3.9  --    < > 3.3*   CHLORIDE mmol/L 108  --  108 107 100 105   < > 109*   < > 115*   < > 108  --    < > 109*   CO2 mmol/L 29  --  30 31 35* 33*   < > 34*   < > 33*   < > 39*  --    < > 34*   CO2, I-STAT mmol/L  --   --   --   --   --   --   --   --   --   --   --   --  38*  --   --    ANION GAP mmol/L 11  --  9 9 8 4   < > 8   < > 5   < > 8  --    < > 6   BUN mg/dL 28*  --  28* 30* 35* 34*   < > 34*   < > 32*   < > 32*  --    < > 37*   CREATININE mg/dL 0.51*  --  0.52* 0.49* 0.50* 0.52*   < > 0.64   < > 0.61   < > 0.62  --    < > 0.59*   EGFR ml/min/1.73sq m 106  --  105 107  107 105   < > 98   < > 100   < > 99  --    < > 101   CALCIUM mg/dL 10.1  --  10.1 10.9* 10.8* 10.5*   < > 10.8*   < > 10.1   < > 9.9  --    < > 10.1   CALCIUM, IONIZED mmol/L  --   --   --   --   --   --   --   --   --   --   --  1.31  --   --   --    CALCIUM, IONIZED, ISTAT mmol/L  --   --   --   --   --   --   --   --   --   --   --   --  1.39*  --   --    MAGNESIUM mg/dL  --  1.9  --   --   --  2.0  --  2.0  --  2.0  --   --   --   --  1.7*   PHOSPHORUS mg/dL  --  1.8*  --   --   --   --   --  1.8*  --  3.3  --   --   --   --   --     < > = values in this interval not displayed.     Results from last 7 days   Lab Units 03/02/24  0510 02/26/24  1446 02/25/24  2301   AST U/L 20 11* 10*   ALT U/L 27 16 17   ALK PHOS U/L 66 63 68   TOTAL PROTEIN g/dL 5.1* 4.9* 4.8*   ALBUMIN g/dL 3.2* 2.5* 2.4*   TOTAL BILIRUBIN mg/dL 0.82 0.78 0.70   BILIRUBIN DIRECT mg/dL  --  0.60*  --      Results from last 7 days   Lab Units 03/02/24  0604 03/02/24  0420 03/02/24  0228 03/02/24  0004 03/01/24  2221 03/01/24  2012 03/01/24  1825 03/01/24  1541 03/01/24  1407 03/01/24  1159 03/01/24  1007 03/01/24  0820   POC GLUCOSE mg/dl 124 141* 158* 155* 98 123 102 149* 150* 158* 150* 121     Results from last 7 days   Lab Units 03/02/24  0510 03/02/24  0423 03/02/24  0006 03/01/24  1614 03/01/24  0538 02/29/24  1753 02/29/24  0429 02/28/24  1818 02/28/24  0430 02/27/24  1939 02/27/24  0437 02/26/24  1446   GLUCOSE RANDOM mg/dL 145* 147* 152* 123 120 166* 136 153* 147* 149* 136 219*       Results from last 7 days   Lab Units 03/02/24  0438 03/01/24  0720 02/29/24  0826 02/28/24  2158 02/28/24  1818   PH ART  7.481* 7.467* 7.461* 7.449 7.412   PCO2 ART mm Hg 37.9 41.4 44.1* 47.4* 50.0*   PO2 ART mm Hg 77.6 77.5 67.8* 81.5 74.8*   HCO3 ART mmol/L 27.7 29.3* 30.7* 32.1* 31.1*   BASE EXC ART mmol/L 4.0 5.1 6.3 7.3 5.6   O2 CONTENT ART mL/dL 12.9* 11.1* 7.6* 11.4* 13.7*   O2 HGB, ARTERIAL % 94.1 94.0 91.5* 94.1 93.8*   ABG SOURCE   --   --  Line,  Arterial Line, Arterial Line, Arterial     Results from last 7 days   Lab Units 02/26/24  0728   PH NICA  7.288*   PCO2 NICA mm Hg 71.2*   PO2 NICA mm Hg 46.5*   HCO3 NICA mmol/L 33.3*   BASE EXC NICA mmol/L 4.8   O2 CONTENT NICA ml/dL 12.9   O2 HGB, VENOUS % 77.3     Results from last 7 days   Lab Units 02/25/24  2300   I STAT BASE EXC mmol/L 9*   I STAT O2 SAT % 79   ISTAT PH ART  7.331*   I STAT ART PCO2 mm HG 68.7*   I STAT ART PO2 mm HG 48.0*   I STAT ART HCO3 mmol/L 36.3*             Results from last 7 days   Lab Units 02/29/24  1157 02/26/24  1446 02/25/24  2301   D-DIMER QUANTITATIVE ug/ml FEU 1.64* 1.97* 1.58*       Results from last 7 days   Lab Units 02/26/24  0130 02/25/24  2301 02/24/24  1245   LACTIC ACID mmol/L 2.0 3.1* 1.4           Results from last 7 days   Lab Units 03/01/24  0555   UNIT PRODUCT CODE  [id number]   UNIT NUMBER  [id number]   UNITABO  A   UNITRH  NEG   CROSSMATCH  Compatible   UNIT DISPENSE STATUS  Presumed Trans   UNIT PRODUCT VOL mL 350         Results from last 7 days   Lab Units 02/26/24  0130   LIPASE u/L 71     Results from last 7 days   Lab Units 03/02/24  0006 02/29/24  0429 02/28/24  0430 02/27/24  0143 02/26/24  1446   CRP mg/L 46.0* 63.1* 133.9* 291.3* 338.1*       Results from last 7 days   Lab Units 02/26/24  1251 02/26/24  1144   BLOOD CULTURE  No Growth After 4 Days. No Growth After 4 Days.         Medications:   Scheduled Medications:  Albumin 5%, 12.5 g, Intravenous, Once  chlorhexidine, 15 mL, Mouth/Throat, Q12H SARTHAK  enoxaparin, 40 mg, Subcutaneous, Q24H SARTHAK  famotidine, 20 mg, Oral, BID  gabapentin, 100 mg, Oral, TID  ipratropium, 0.5 mg, Nebulization, TID  lamoTRIgine, 150 mg, Oral, BID  levalbuterol, 1.25 mg, Nebulization, TID  methylPREDNISolone sodium succinate, 125 mg, Intravenous, Q12H SARTHAK  nystatin, , Topical, BID  OLANZapine, 5 mg, Oral, Q12H  polyethylene glycol, 17 g, Per NG Tube, Daily  senna, 1 tablet, Per NG Tube, BID  sodium chloride, 2 spray, Each  Nare, Q4H  topiramate, 100 mg, Per PEG Tube, BID  venlafaxine, 25 mg, Per NG Tube, TID With Meals      Continuous IV Infusions:  dexmedetomidine, 0.1-0.7 mcg/kg/hr, Intravenous, Titrated  furosemide, 20 mg/hr, Intravenous, Continuous  insulin regular (HumuLIN R,NovoLIN R) 1 Units/mL in sodium chloride 0.9 % 100 mL infusion, 0.3-21 Units/hr, Intravenous, Titrated      PRN Meds:  acetaminophen, 1,000 mg, Intravenous, Q6H PRN  HYDROmorphone, 0.2 mg, Intravenous, Q2H PRN  ondansetron, 4 mg, Intravenous, Q6H PRN  oxyCODONE, 2.5 mg, Oral, Q4H PRN   Or  oxyCODONE, 5 mg, Oral, Q4H PRN  sodium chloride, 1 Application, Nasal, Q1H PRN        Discharge Plan: D    Network Utilization Review Department  ATTENTION: Please call with any questions or concerns to [phone] and carefully listen to the prompts so that you are directed to the right person. All voicemails are confidential.   For Discharge needs, contact Care Management DC Support Team at [phone] opt. 2  Send all requests for admission clinical reviews, approved or denied determinations and any other requests to dedicated fax number below belonging to the campus where the patient is receiving treatment. List of dedicated fax numbers for the Facilities:  FACILITY NAME UR FAX NUMBER   ADMISSION DENIALS (Administrative/Medical Necessity) [phone]   DISCHARGE SUPPORT TEAM (NETWORK) [phone]   PARENT CHILD HEALTH (Maternity/NICU/Pediatrics) [phone]   [facility] [phone]   [facility] [phone]   [facility] [phone]   [facility] [phone]   Critical access hospital [phone]   [facility] [phone]   [facility] [phone]   [facility] [phone]   [facility] [phone]   [facility] [phone]   [facility] [phone]   [facility] [phone]

## 2024-03-02 NOTE — PROGRESS NOTES
[facility]  Progress Note: Critical Care  Name: Carla Hunt 59 y.o. female I MRN: [id number]  Unit/Bed#: MICU 10 I Date of Admission: 1/10/2024   Date of Service: 3/2/2024 I Hospital Day: 52    Assessment/Plan   Neuro:   Diagnosis: Analgesia  -As needed pain medications  -Precedex at 0.5 for possible withdrawal    Diagnosis: seizure disorder  -S/p partial left temporal lobe resection in 2007  -On Lamictal 150 bid and Topamax 100 bid     Diagnosis: Anxiety  -On venlafaxine 25 mg TID via NGT  -Currently on Zyprexa 5 mg ODT every 12 hours  -Gabapentin 200 mg TID     CV:   Diagnosis: Distributive shock, systolics in the 90s-110s  -Levophed drip off  -On vasopressin 0.04 intermittently, currently off  -Maintain MAPs > 65 mmHg     Pulm:  Diagnosis: Acute hypoxic respiratory failure due to severe covid  and covid induced fibrosis  -Tested COVID positive on 1/7  -Completed severe COVID pathway and 7 days CTX  -Tenuous respiratory status requiring multiple re-admissions to MICU  -S/p Bronch 2/2 and 02/16  -NG on cultures (initially showed non-group A strep)  -PCP and AFB negative   -Legionella, viral, fungal cultures no growth to date  -Pulse dose steroids with solumedrol 1g daily x3 days (completed 2/7) then transitioned to prednisone 80mg daily on 2/8  -Off veletri  -Completed IVIG for 5 days  -CT scan 2/24: general improvement of areas of consolidation and some areas of groundglass opacification on the lungs, still with significant groundglass opacification especially in the lower lobes. Bilateral consolidations in lower lobes. No evidence of PE.  -Currently on solumedrol 125 mg every 12 hours  -Planning for CRP and D-dimer every 48 hours  -Extubated to mid flow transition to BiPAP for increased work of breathing.     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) though unclear how this was diagnosed, this also does not enirely fit as she is NOT hypokalemic  -Continue to monitor I/O and UOP     Diagnosis: Hypernatremia, resolved  -Can consider reducing free water flushes to 250 mL every 4 hours     Diagnosis: Acute kidney injury, sCr at 0.99, resolved     F/E/N:   F: Off IVF  E: monitor and replete for goal K>4, P>3, Mg>2  N: On tube feeds with glucerna with free water flushes 350 mL every 2 hours     Heme/Onc:   Diagnosis: Anemia  Likely multifactorial in nature, acute in the setting of recent sepsis/inflammatory state complicated by chronic anemia due to her other history of lymphoma and CKD  -Hgb at 7.8 this morning  -She did receive 1 unit transfusion yesterday     Diagnosis: Thrombocytopenia, improving  -Will continue to monitor  -Could be in the setting of transfusions or sepsis. It may also be dilutional since she has been on IV fluids for past 24 hours  -Monitor sites for bleeding  -Platelet levels at 115 this morning     Diagnosis: B cell lymphoma  -Follows with heme/onc at [facility]  -On rituxan for 2 year course, started May 2022  -Last dose was 12/20, treatment currently on hold   -Leukopenic on admission but WBC rising to 17.9     DVT ppx with lovenox     Endo:   Diagnosis: steroid hyperglycemia and diabetes mellitus type 2, A1c at 6.6 from 01/2024  -Goal -180  -BG range last 24hrs 120-169  -On insulin drip     ID:   Diagnosis: Severe covid pneumonia and stenotrophomonas pneumonia  -Completed severe COVID pathway with decadron (ended 1/22) and remdesivir (ended 1/20), also completed 7 days CTX on 1/15  -C/f opportunistic infections given immunocompromised status on chemotherapy and recent steroid use  -No consolidations on CXR and procal negative  -S/p  bactrim and minocycline x5 days for stenotrophomonas on sputum culture (1/25), then on bactrim for PJP ppx  -Bronc on 2/2 - Cx w/o growth (initially resulted as 1+ alpha hemolytic strep), AFB & PCP negative, f/u fungal, legionella, and viral cultures   -2/10 pt again had escalating O2 requirements on floors necessitating readmission to ICU  -Concern that with recent pulse dose steroids and now worsening respiratory status she could have infection, possibly opportunistic   -UA with moderate leukocytes, nitrite negative, 30-50 WBC, trace protein.  -Repeat sputum culture 2/9 with 4+ stenotrophomonas  -Bronch cultures with candida albicans, Rare gram positive cocci in pairs  -Previously completed 14 day course of antibiotics for stenotrophomonas pneumonia  -Completed 5 days of remdesevir  -Levaquin continued for an additional 7 days since bronchial cultures with pseudomonas MDR susceptible to levaquin. Levaquin discontinued at this point since patient has likely completed >7 days of therapy for pseudomonas susceptible to antibiotics.     MSK/Skin:   -Wound care team following for bilateral sacral wounds     Disposition: Critical care    ICU Core Measures     A: Assess, Prevent, and Manage Pain Has pain been assessed? Yes  Need for changes to pain regimen? No   B: Both SAT/SAT  N/A   C: Choice of Sedation RASS Goal: 0 Alert and Calm  Need for changes to sedation or analgesia regimen? No   D: Delirium CAM-ICU: Negative   E: Early Mobility  Plan for early mobility? Yes   F: Family Engagement Plan for family engagement today? Yes       Review of Invasive Devices:    Diana Plan: Continue for accurate I/O monitoring for 48 hours  Central access plan: Patient has multiple central venous catheters.   Amelia Plan: Keep arterial line for hemodynamic monitoring    Prophylaxis:  VTE VTE covered by:  enoxaparin, Subcutaneous, 40 mg at 03/01/24 0805       Stress Ulcer  covered byfamotidine (PEPCID) tablet 20 mg [[id number]]         Significant 24hr Events     24hr events: 59 years old with B-Cell lymphoma - completed rituximab in Dec 2023, seizure disorder, anxiety, CKD III, presented 1/7 for encephalopathy. Found to have COVID-19 infection.  Treated Remdesivir/Decadron/actemra, extensive neurologic workup negative except very small SAH (LP, imaging, vEEG).  She has had protracted course with repeated episodes of worsened hypoxic respiratory failure to include multiple bronchoscopies with treatment for possible COVID pneumonitis and Stenotrophomonas. Returned ICU 2/1 - pulse dosed steroids 2/5-2/8 with further taper, required intubation 2/13 also with severe epistaxis.  IVIG course completed 2/15. Repeated Remdesivir course 2/20-2/25.  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  Extubated 3/1    Was tried on mid flow postextubation will put on BiPAP for increased work of breathing.  She was put on Precedex and Dilaudid to better manage possible withdrawals.     Subjective     Review of Systems   Reason unable to perform ROS: Limited by patient's BiPAP and sedation.   Constitutional:  Negative for chills and fever.   Respiratory:  Positive for shortness of breath.    Gastrointestinal:  Negative for abdominal pain and constipation.        Objective                            Vitals I/O      Most Recent Min/Max in 24hrs   Temp (!) 101.5 °F (38.6 °C) Temp  Min: 97.3 °F (36.3 °C)  Max: 101.8 °F (38.8 °C)   Pulse (!) 107 Pulse  Min: 80  Max: 141   Resp (!) 26 Resp  Min: 17  Max: 40   /69 BP  Min: 115/65  Max: 164/81   O2 Sat 94 % SpO2  Min: 88 %  Max: 98 %      Intake/Output Summary (Last 24 hours) at 3/2/2024 0743  Last data filed at 3/2/2024 0600  Gross per 24 hour   Intake 3253.56 ml   Output 5770 ml   Net -2516.44 ml       Diet Enteral/Parenteral; Tube Feeding No Oral Diet; Glucerna 1.2; Continuous; 20; Prosource Protein Liquid - One Packet; 30; Water; Every 4 hours    Invasive Monitoring    Arterial Line  Amelia /57  Arterial Line BP  Min: 106/50  Max: 220/103   MAP 81 mmHg  Arterial Line MAP (mmHg)  Min: 67 mmHg  Max: 152 mmHg           Physical Exam   Physical Exam  Skin:     General: Skin is warm.   HENT:      Head: Normocephalic and atraumatic.   Cardiovascular:      Rate and Rhythm: Normal rate and regular rhythm.   Musculoskeletal:      Right lower leg: No edema.      Left lower leg: No edema.   Abdominal:      Tenderness: There is no abdominal tenderness. There is no guarding.   Constitutional:       General: She is not in acute distress.     Appearance: She is well-developed and well-nourished. She is not toxic-appearing.      Interventions: She is not sedated and not intubated.  Pulmonary:      Effort: Tachypnea present. She is not intubated.      Breath sounds: No wheezing or rales.   Neurological:      Mental Status: She is somnolent.            Diagnostic Studies      EKG: reviewed  Imaging: bradley MELENDEZ have personally reviewed pertinent reports.       Medications:  Scheduled PRN   Albumin 5%, 12.5 g, Once  chlorhexidine, 15 mL, Q12H SARTHAK  enoxaparin, 40 mg, Q24H SARTHAK  famotidine, 20 mg, BID  gabapentin, 100 mg, TID  ipratropium, 0.5 mg, TID  lamoTRIgine, 150 mg, BID  levalbuterol, 1.25 mg, TID  methylPREDNISolone sodium succinate, 125 mg, Q12H SARTHAK  nystatin, , BID  OLANZapine, 5 mg, Q12H  polyethylene glycol, 17 g, Daily  potassium-sodium phosphates, 2 packet, Once  senna, 1 tablet, BID  sodium chloride, 2 spray, Q4H  topiramate, 100 mg, BID  venlafaxine, 25 mg, TID With Meals      acetaminophen, 1,000 mg, Q6H PRN  HYDROmorphone, 0.2 mg, Q2H PRN  ondansetron, 4 mg, Q6H PRN  oxyCODONE, 2.5 mg, Q4H PRN   Or  oxyCODONE, 5 mg, Q4H PRN  sodium chloride, 1 Application, Q1H PRN       Continuous    dexmedetomidine, 0.1-0.7 mcg/kg/hr, Last Rate: 0.5 mcg/kg/hr (03/02/24 0785)  insulin regular (HumuLIN R,NovoLIN R) 1 Units/mL in sodium chloride 0.9 % 100 mL infusion, 0.3-21 Units/hr, Last Rate:  0.5 Units/hr (03/02/24 0604)         Labs:    CBC    Recent Labs     03/01/24  0538 03/02/24  0443   WBC 11.69* 17.93*   HGB 7.8* 8.9*   HCT 25.5* 27.0*   PLT 89* 115*   BANDSPCT 3  --      BMP    Recent Labs     03/02/24  0423 03/02/24  0510   SODIUM 147 148*   K 3.6 3.7    108   CO2 30 29   AGAP 9 11   BUN 28* 28*   CREATININE 0.52* 0.51*   CALCIUM 10.1 10.1       Coags    No recent results     Additional Electrolytes  Recent Labs     03/01/24  0538 03/02/24  0510   MG 2.0 1.9   PHOS  --  1.8*          Blood Gas    Recent Labs     02/29/24  0826 03/01/24  0720 03/02/24  0438   PHART 7.461*   < > 7.481*   YKW1TRA 44.1*   < > 37.9   PO2ART 67.8*   < > 77.6   ALK5NJC 30.7*   < > 27.7   BEART 6.3   < > 4.0   SOURCE Line, Arterial  --   --     < > = values in this interval not displayed.     Recent Labs     02/29/24  0826   SOURCE Line, Arterial    LFTs  Recent Labs     03/02/24  0510   ALT 27   AST 20   ALKPHOS 66   ALB 3.2*   TBILI 0.82       Infectious  No recent results  Glucose  Recent Labs     03/01/24  1614 03/02/24  0006 03/02/24  0423 03/02/24  0510   GLUC 123 152* 147* 145*               Het HOSSEIN Calle DO

## 2024-03-02 NOTE — PLAN OF CARE
Problem: Prexisting or High Potential for Compromised Skin Integrity  Goal: Skin integrity is maintained or improved  Description: INTERVENTIONS:  - Identify patients at risk for skin breakdown  - Assess and monitor skin integrity  - Assess and monitor nutrition and hydration status  - Monitor labs   - Assess for incontinence   - Turn and reposition patient  - Assist with mobility/ambulation  - Relieve pressure over bony prominences  - Avoid friction and shearing  - Provide appropriate hygiene as needed including keeping skin clean and dry  - Evaluate need for skin moisturizer/barrier cream  - Collaborate with interdisciplinary team   - Patient/family teaching  - Consider wound care consult   Outcome: Progressing     Problem: INFECTION - ADULT  Goal: Absence or prevention of progression during hospitalization  Description: INTERVENTIONS:  - Assess and monitor for signs and symptoms of infection  - Monitor lab/diagnostic results  - Monitor all insertion sites, i.e. indwelling lines, tubes, and drains  - Monitor endotracheal if appropriate and nasal secretions for changes in amount and color  - [place] appropriate cooling/warming therapies per order  - Administer medications as ordered  - Instruct and encourage patient and family to use good hand hygiene technique  - Identify and instruct in appropriate isolation precautions for identified infection/condition  Outcome: Progressing     Problem: SAFETY ADULT  Goal: Patient will remain free of falls  Description: INTERVENTIONS:  - Educate patient/family on patient safety including physical limitations  - Instruct patient to call for assistance with activity   - Consult OT/PT to assist with strengthening/mobility   - Keep Call bell within reach  - Keep bed low and locked with side rails adjusted as appropriate  - Keep care items and personal belongings within reach  - Initiate and maintain comfort rounds  - Make Fall Risk Sign visible to staff  - Offer Toileting every  2 Hours, in advance of need  - Initiate/Maintain bed alarm  - Obtain necessary fall risk management equipment: non skid footwear  - Apply yellow socks and bracelet for high fall risk patients  - Consider moving patient to room near nurses station  Outcome: Progressing     Problem: RESPIRATORY - ADULT  Goal: Achieves optimal ventilation and oxygenation  Description: INTERVENTIONS:  - Assess for changes in respiratory status  - Assess for changes in mentation and behavior  - Position to facilitate oxygenation and minimize respiratory effort  - Oxygen administered by appropriate delivery if ordered  - Initiate smoking cessation education as indicated  - Encourage broncho-pulmonary hygiene including cough, deep breathe, Incentive Spirometry  - Assess the need for suctioning and aspirate as needed  - Assess and instruct to report SOB or any respiratory difficulty  - Respiratory Therapy support as indicated  Outcome: Progressing     Problem: SKIN/TISSUE INTEGRITY - ADULT  Goal: Skin Integrity remains intact(Skin Breakdown Prevention)  Description: Assess:  -Perform Flavio assessment every shift   -Clean and moisturize skin every day   -Inspect skin when repositioning, toileting, and assisting with ADLS  -Assess under medical devices such as ronny  every hour   -Assess extremities for adequate circulation and sensation     Bed Management:  -Have minimal linens on bed & keep smooth, unwrinkled  -Change linens as needed when moist or perspiring  -Avoid sitting or lying in one position for more than 2 hours while in bed  -Keep HOB at 45 degrees     Toileting:  -Offer bedside commode  -Assess for incontinence every hour  -Use incontinent care products after each incontinent episode such as moisture barrier cream     Activity:  -Mobilize patient 3 times a day  -Encourage activity and walks on unit  -Encourage or provide ROM exercises   -Turn and reposition patient every 2 Hours  -Use appropriate equipment to lift or move  patient in bed  -Instruct/ Assist with weight shifting every hour when out of bed in chair  -Consider limitation of chair time 2 hour intervals    Skin Care:  -Avoid use of baby powder, tape, friction and shearing, hot water or constrictive clothing  -Relieve pressure over bony prominences using allevyn   -Do not massage red bony areas    Next Steps:  -Teach patient strategies to minimize risks such as weight shifting    -Consider consults to  interdisciplinary teams such as PT  Outcome: Progressing  Goal: Incision(s), wounds(s) or drain site(s) healing without S/S of infection  Description: INTERVENTIONS  - Assess and document dressing, incision, wound bed, drain sites and surrounding tissue  - Provide patient and family education  - Perform skin care/dressing changes every   Outcome: Progressing  Goal: Pressure injury heals and does not worsen  Description: Interventions:  - Implement low air loss mattress or specialty surface (Criteria met)  - Apply silicone foam dressing  - Instruct/assist with weight shifting every  minutes when in chair   - Limit chair time to  hour intervals  - Use special pressure reducing interventions such as  when in chair   - Apply fecal or urinary incontinence containment device   - Perform passive or active ROM every   - Turn and reposition patient & offload bony prominences every  hours   - Utilize friction reducing device or surface for transfers   - Consider consults to  interdisciplinary teams such as   - Use incontinent care products after each incontinent episode such as  - Consider nutrition services referral as needed  Outcome: Progressing     Problem: Potential for Falls  Goal: Patient will remain free of falls  Description: INTERVENTIONS:  - Educate patient/family on patient safety including physical limitations  - Instruct patient to call for assistance with activity   - Consult OT/PT to assist with strengthening/mobility   - Keep Call bell within reach  - Keep bed low and  locked with side rails adjusted as appropriate  - Keep care items and personal belongings within reach  - Initiate and maintain comfort rounds  - Make Fall Risk Sign visible to staff  - Offer Toileting every 2 Hours, in advance of need  - Initiate/Maintain bed alarm  - Obtain necessary fall risk management equipment: non skid footwear  - Apply yellow socks and bracelet for high fall risk patients  - Consider moving patient to room near nurses station  Outcome: Progressing     Problem: Nutrition/Hydration-ADULT  Goal: Nutrient/Hydration intake appropriate for improving, restoring or maintaining nutritional needs  Description: Monitor and assess patient's nutrition/hydration status for malnutrition. Collaborate with interdisciplinary team and initiate plan and interventions as ordered.  Monitor patient's weight and dietary intake as ordered or per policy. Utilize nutrition screening tool and intervene as necessary. Determine patient's food preferences and provide high-protein, high-caloric foods as appropriate.     INTERVENTIONS:  - Monitor oral intake, urinary output, labs, and treatment plans  - Assess nutrition and hydration status and recommend course of action  - Evaluate amount of meals eaten  - Assist patient with eating if necessary   - Allow adequate time for meals  - Recommend/ encourage appropriate diets, oral nutritional supplements, and vitamin/mineral supplements  - Order, calculate, and assess calorie counts as needed  - Recommend, monitor, and adjust tube feedings and TPN/PPN based on assessed needs  - Assess need for intravenous fluids  - Provide specific nutrition/hydration education as appropriate  - Include patient/family/caregiver in decisions related to nutrition  Outcome: Progressing     Problem: SAFETY,RESTRAINT: NV/NON-SELF DESTRUCTIVE BEHAVIOR  Goal: Remains free of harm/injury (restraint for non violent/non self-detsructive behavior)  Description: INTERVENTIONS:  - Instruct patient/family  regarding restraint use   - Assess and monitor physiologic and psychological status   - Provide interventions and comfort measures to meet assessed patient needs   - Identify and implement measures to help patient regain control  - Assess readiness for release of restraint   Outcome: Progressing  Goal: Returns to optimal restraint-free functioning  Description: INTERVENTIONS:  - Assess the patient's behavior and symptoms that indicate continued need for restraint  - Identify and implement measures to help patient regain control  - Assess readiness for release of restraint   Outcome: Progressing     Problem: COPING  Goal: Pt/Family able to verbalize concerns and demonstrate effective coping strategies  Description: INTERVENTIONS:  - Assist patient/family to identify coping skills, available support systems and cultural and spiritual values  - Provide emotional support, including active listening and acknowledgement of concerns of patient and caregivers  - Reduce environmental stimuli, as able  - Provide patient education  - Assess for spiritual pain/suffering and initiate spiritual care, including notification of Pastoral Care or [place] based community as needed  - Assess effectiveness of coping strategies  Outcome: Progressing  Goal: Will report anxiety at manageable levels  Description: INTERVENTIONS:  - Administer medication as ordered  - Teach and encourage coping skills  - Provide emotional support  - Assess patient/family for anxiety and ability to cope  Outcome: Progressing     Problem: BEHAVIOR  Goal: Pt/Family maintain appropriate behavior and adhere to behavioral management agreement, if implemented  Description: INTERVENTIONS:  - Assess the family dynamic   - Encourage verbalization of thoughts and concerns in a socially appropriate manner  - Assess patient/family's coping skills and non-compliant behavior (including use of illegal substances).  - Utilize positive, consistent limit setting strategies  supporting safety of patient, staff and others  - Initiate consult with Case Management, Spiritual Care or other ancillary services as appropriate  - If a patient's/visitor's behavior jeopardizes the safety of the patient, staff, or others, refer to organization procedure.   - Notify Security of behavior or suspected illegal substances which indicate the need for search of the patient and/or belongings  - Encourage participation in the decision making process about a behavioral management agreement; implement if patient meets criteria  Outcome: Progressing     Problem: NEUROSENSORY - ADULT  Goal: Achieves stable or improved neurological status  Description: INTERVENTIONS  - Monitor and report changes in neurological status  - Monitor vital signs such as temperature, blood pressure, glucose, and any other labs ordered   - Initiate measures to prevent increased intracranial pressure  - Monitor for seizure activity and implement precautions if appropriate      Outcome: Progressing  Goal: Achieves maximal functionality and self care  Description: INTERVENTIONS  - Monitor swallowing and airway patency with patient fatigue and changes in neurological status  - Encourage and assist patient to increase activity and self care.   - Encourage visually impaired, hearing impaired and aphasic patients to use assistive/communication devices  Outcome: Progressing     Problem: CARDIOVASCULAR - ADULT  Goal: Maintains optimal cardiac output and hemodynamic stability  Description: INTERVENTIONS:  - Monitor I/O, vital signs and rhythm  - Monitor for S/S and trends of decreased cardiac output  - Administer and titrate ordered vasoactive medications to optimize hemodynamic stability  - Assess quality of pulses, skin color and temperature  - Assess for signs of decreased coronary artery perfusion  - Instruct patient to report change in severity of symptoms  Outcome: Progressing  Goal: Absence of cardiac dysrhythmias or at baseline  rhythm  Description: INTERVENTIONS:  - Continuous cardiac monitoring, vital signs, obtain 12 lead EKG if ordered  - Administer antiarrhythmic and heart rate control medications as ordered  - Monitor electrolytes and administer replacement therapy as ordered  Outcome: Progressing     Problem: GASTROINTESTINAL - ADULT  Goal: Minimal or absence of nausea and/or vomiting  Description: INTERVENTIONS:  - Administer IV fluids if ordered to ensure adequate hydration  - Maintain NPO status until nausea and vomiting are resolved  - Nasogastric tube if ordered  - Administer ordered antiemetic medications as needed  - Provide nonpharmacologic comfort measures as appropriate  - Advance diet as tolerated, if ordered  - Consider nutrition services referral to assist patient with adequate nutrition and appropriate food choices  Outcome: Progressing  Goal: Maintains or returns to baseline bowel function  Description: INTERVENTIONS:  - Assess bowel function  - Encourage oral fluids to ensure adequate hydration  - Administer IV fluids if ordered to ensure adequate hydration  - Administer ordered medications as needed  - Encourage mobilization and activity  - Consider nutritional services referral to assist patient with adequate nutrition and appropriate food choices  Outcome: Progressing  Goal: Maintains adequate nutritional intake  Description: INTERVENTIONS:  - Monitor percentage of each meal consumed  - Identify factors contributing to decreased intake, treat as appropriate  - Assist with meals as needed  - Monitor I&O, weight, and lab values if indicated  - Obtain nutrition services referral as needed  Outcome: Progressing  Goal: Establish and maintain optimal ostomy function  Description: INTERVENTIONS:  - Assess bowel function  - Encourage oral fluids to ensure adequate hydration  - Administer IV fluids if ordered to ensure adequate hydration   - Administer ordered medications as needed  - Encourage mobilization and activity  -  Nutrition services referral to assist patient with appropriate food choices  - Assess stoma site  - Consider wound care consult   Outcome: Progressing  Goal: Oral mucous membranes remain intact  Description: INTERVENTIONS  - Assess oral mucosa and hygiene practices  - Implement preventative oral hygiene regimen  - Implement oral medicated treatments as ordered  - Initiate Nutrition services referral as needed  Outcome: Progressing     Problem: GENITOURINARY - ADULT  Goal: Maintains or returns to baseline urinary function  Description: INTERVENTIONS:  - Assess urinary function  - Encourage oral fluids to ensure adequate hydration if ordered  - Administer IV fluids as ordered to ensure adequate hydration  - Administer ordered medications as needed  - Offer frequent toileting  - Follow urinary retention protocol if ordered  Outcome: Progressing  Goal: Urinary catheter remains patent  Description: INTERVENTIONS:  - Assess patency of urinary catheter  - If patient has a chronic marie, consider changing catheter if non-functioning  - Follow guidelines for intermittent irrigation of non-functioning urinary catheter  Outcome: Progressing     Problem: METABOLIC, FLUID AND ELECTROLYTES - ADULT  Goal: Electrolytes maintained within normal limits  Description: INTERVENTIONS:  - Monitor labs and assess patient for signs and symptoms of electrolyte imbalances  - Administer electrolyte replacement as ordered  - Monitor response to electrolyte replacements, including repeat lab results as appropriate  - Instruct patient on fluid and nutrition as appropriate  Outcome: Progressing  Goal: Fluid balance maintained  Description: INTERVENTIONS:  - Monitor labs   - Monitor I/O and WT  - Instruct patient on fluid and nutrition as appropriate  - Assess for signs & symptoms of volume excess or deficit  Outcome: Progressing  Goal: Glucose maintained within target range  Description: INTERVENTIONS:  - Monitor Blood Glucose as ordered  - Assess  for signs and symptoms of hyperglycemia and hypoglycemia  - Administer ordered medications to maintain glucose within target range  - Assess nutritional intake and initiate nutrition service referral as needed  Outcome: Progressing     Problem: HEMATOLOGIC - ADULT  Goal: Maintains hematologic stability  Description: INTERVENTIONS  - Assess for signs and symptoms of bleeding or hemorrhage  - Monitor labs  - Administer supportive blood products/factors as ordered and appropriate  Outcome: Progressing     Problem: MUSCULOSKELETAL - ADULT  Goal: Maintain or return mobility to safest level of function  Description: INTERVENTIONS:  - Assess patient's ability to carry out ADLs; assess patient's baseline for ADL function and identify physical deficits which impact ability to perform ADLs (bathing, care of mouth/teeth, toileting, grooming, dressing, etc.)  - Assess/evaluate cause of self-care deficits   - Assess range of motion  - Assess patient's mobility  - Assess patient's need for assistive devices and provide as appropriate  - Encourage maximum independence but intervene and supervise when necessary  - Involve family in performance of ADLs  - Assess for home care needs following discharge   - Consider OT consult to assist with ADL evaluation and planning for discharge  - Provide patient education as appropriate  Outcome: Progressing

## 2024-03-03 VITALS
TEMPERATURE: 97 F | RESPIRATION RATE: 17 BRPM | WEIGHT: 155.87 LBS | BODY MASS INDEX: 23.62 KG/M2 | DIASTOLIC BLOOD PRESSURE: 87 MMHG | OXYGEN SATURATION: 94 % | HEIGHT: 68 IN | SYSTOLIC BLOOD PRESSURE: 166 MMHG | HEART RATE: 110 BPM

## 2024-03-03 LAB
ALBUMIN SERPL BCP-MCNC: 3 G/DL (ref 3.5–5)
ALP SERPL-CCNC: 60 U/L (ref 34–104)
ALT SERPL W P-5'-P-CCNC: 19 U/L (ref 7–52)
ANION GAP SERPL CALCULATED.3IONS-SCNC: 11 MMOL/L
ANION GAP SERPL CALCULATED.3IONS-SCNC: 6 MMOL/L
AST SERPL W P-5'-P-CCNC: 11 U/L (ref 13–39)
ATRIAL RATE: 57 BPM
BILIRUB SERPL-MCNC: 0.81 MG/DL (ref 0.2–1)
BUN SERPL-MCNC: 27 MG/DL (ref 5–25)
BUN SERPL-MCNC: 33 MG/DL (ref 5–25)
CALCIUM ALBUM COR SERPL-MCNC: 10.6 MG/DL (ref 8.3–10.1)
CALCIUM SERPL-MCNC: 9.8 MG/DL (ref 8.4–10.2)
CALCIUM SERPL-MCNC: 9.8 MG/DL (ref 8.4–10.2)
CHLORIDE SERPL-SCNC: 112 MMOL/L (ref 96–108)
CHLORIDE SERPL-SCNC: 114 MMOL/L (ref 96–108)
CO2 SERPL-SCNC: 28 MMOL/L (ref 21–32)
CO2 SERPL-SCNC: 32 MMOL/L (ref 21–32)
CREAT SERPL-MCNC: 0.51 MG/DL (ref 0.6–1.3)
CREAT SERPL-MCNC: 0.53 MG/DL (ref 0.6–1.3)
ERYTHROCYTE [DISTWIDTH] IN BLOOD BY AUTOMATED COUNT: 20.6 % (ref 11.6–15.1)
FERRITIN SERPL-MCNC: 974 NG/ML (ref 11–307)
FLUAV RNA RESP QL NAA+PROBE: NEGATIVE
FLUBV RNA RESP QL NAA+PROBE: NEGATIVE
GFR SERPL CREATININE-BSD FRML MDRD: 104 ML/MIN/1.73SQ M
GFR SERPL CREATININE-BSD FRML MDRD: 106 ML/MIN/1.73SQ M
GLUCOSE SERPL-MCNC: 247 MG/DL (ref 65–140)
GLUCOSE SERPL-MCNC: 255 MG/DL (ref 65–140)
GLUCOSE SERPL-MCNC: 260 MG/DL (ref 65–140)
GLUCOSE SERPL-MCNC: 296 MG/DL (ref 65–140)
GLUCOSE SERPL-MCNC: 298 MG/DL (ref 65–140)
GLUCOSE SERPL-MCNC: 328 MG/DL (ref 65–140)
HCT VFR BLD AUTO: 24.9 % (ref 34.8–46.1)
HGB BLD-MCNC: 7.7 G/DL (ref 11.5–15.4)
IRON SERPL-MCNC: <10 UG/DL (ref 50–212)
LACTATE SERPL-SCNC: 1.3 MMOL/L (ref 0.5–2)
MAGNESIUM SERPL-MCNC: 2.4 MG/DL (ref 1.9–2.7)
MCH RBC QN AUTO: 30.2 PG (ref 26.8–34.3)
MCHC RBC AUTO-ENTMCNC: 30.9 G/DL (ref 31.4–37.4)
MCV RBC AUTO: 98 FL (ref 82–98)
NRBC BLD AUTO-RTO: 0 /100 WBCS
P AXIS: 55 DEGREES
PHOSPHATE SERPL-MCNC: 3.1 MG/DL (ref 2.7–4.5)
PLATELET # BLD AUTO: 144 THOUSANDS/UL (ref 149–390)
PMV BLD AUTO: 12.4 FL (ref 8.9–12.7)
POTASSIUM SERPL-SCNC: 2.9 MMOL/L (ref 3.5–5.3)
POTASSIUM SERPL-SCNC: 3.4 MMOL/L (ref 3.5–5.3)
PR INTERVAL: 138 MS
PROT SERPL-MCNC: 5.3 G/DL (ref 6.4–8.4)
QRS AXIS: 22 DEGREES
QRSD INTERVAL: 70 MS
QT INTERVAL: 384 MS
QTC INTERVAL: 373 MS
RBC # BLD AUTO: 2.55 MILLION/UL (ref 3.81–5.12)
RSV RNA RESP QL NAA+PROBE: NEGATIVE
SARS-COV-2 RNA RESP QL NAA+PROBE: POSITIVE
SARS-COV-2 RNA SPEC QL NAA+PROBE: ABNORMAL
SODIUM SERPL-SCNC: 150 MMOL/L (ref 135–147)
SODIUM SERPL-SCNC: 153 MMOL/L (ref 135–147)
T WAVE AXIS: 55 DEGREES
TIBC SERPL-MCNC: <173 UG/DL (ref 250–450)
UIBC SERPL-MCNC: 163 UG/DL (ref 155–355)
VENTRICULAR RATE: 57 BPM
WBC # BLD AUTO: 14.56 THOUSAND/UL (ref 4.31–10.16)

## 2024-03-03 PROCEDURE — 82728 ASSAY OF FERRITIN: CPT

## 2024-03-03 PROCEDURE — 94760 N-INVAS EAR/PLS OXIMETRY 1: CPT

## 2024-03-03 PROCEDURE — 83735 ASSAY OF MAGNESIUM: CPT

## 2024-03-03 PROCEDURE — 84100 ASSAY OF PHOSPHORUS: CPT

## 2024-03-03 PROCEDURE — 99233 SBSQ HOSP IP/OBS HIGH 50: CPT | Performed by: INTERNAL MEDICINE

## 2024-03-03 PROCEDURE — 83605 ASSAY OF LACTIC ACID: CPT

## 2024-03-03 PROCEDURE — C9113 INJ PANTOPRAZOLE SODIUM, VIA: HCPCS

## 2024-03-03 PROCEDURE — 94640 AIRWAY INHALATION TREATMENT: CPT

## 2024-03-03 PROCEDURE — 0241U HB NFCT DS VIR RESP RNA 4 TRGT: CPT | Performed by: INTERNAL MEDICINE

## 2024-03-03 PROCEDURE — 93010 ELECTROCARDIOGRAM REPORT: CPT | Performed by: INTERNAL MEDICINE

## 2024-03-03 PROCEDURE — 94664 DEMO&/EVAL PT USE INHALER: CPT

## 2024-03-03 PROCEDURE — 94003 VENT MGMT INPAT SUBQ DAY: CPT

## 2024-03-03 PROCEDURE — 82948 REAGENT STRIP/BLOOD GLUCOSE: CPT

## 2024-03-03 PROCEDURE — 99291 CRITICAL CARE FIRST HOUR: CPT | Performed by: INTERNAL MEDICINE

## 2024-03-03 PROCEDURE — 80053 COMPREHEN METABOLIC PANEL: CPT

## 2024-03-03 PROCEDURE — 93005 ELECTROCARDIOGRAM TRACING: CPT

## 2024-03-03 PROCEDURE — 80048 BASIC METABOLIC PNL TOTAL CA: CPT | Performed by: INTERNAL MEDICINE

## 2024-03-03 PROCEDURE — 83540 ASSAY OF IRON: CPT

## 2024-03-03 PROCEDURE — 83550 IRON BINDING TEST: CPT

## 2024-03-03 PROCEDURE — 85027 COMPLETE CBC AUTOMATED: CPT

## 2024-03-03 RX ORDER — PANTOPRAZOLE SODIUM 40 MG/10ML
40 INJECTION, POWDER, LYOPHILIZED, FOR SOLUTION INTRAVENOUS EVERY 12 HOURS SCHEDULED
Status: DISCONTINUED | OUTPATIENT
Start: 2024-03-03 | End: 2024-03-04

## 2024-03-03 RX ORDER — HYDRALAZINE HYDROCHLORIDE 20 MG/ML
10 INJECTION INTRAMUSCULAR; INTRAVENOUS EVERY 6 HOURS PRN
Status: DISCONTINUED | OUTPATIENT
Start: 2024-03-03 | End: 2024-03-12

## 2024-03-03 RX ORDER — FUROSEMIDE 10 MG/ML
20 INJECTION INTRAMUSCULAR; INTRAVENOUS ONCE
Status: COMPLETED | OUTPATIENT
Start: 2024-03-03 | End: 2024-03-03

## 2024-03-03 RX ORDER — INSULIN GLARGINE 100 [IU]/ML
10 INJECTION, SOLUTION SUBCUTANEOUS EVERY MORNING
Status: DISCONTINUED | OUTPATIENT
Start: 2024-03-03 | End: 2024-03-04

## 2024-03-03 RX ORDER — ACETAMINOPHEN 325 MG/1
650 TABLET ORAL EVERY 6 HOURS PRN
Status: DISCONTINUED | OUTPATIENT
Start: 2024-03-03 | End: 2024-03-08

## 2024-03-03 RX ORDER — OLANZAPINE 5 MG/1
5 TABLET, ORALLY DISINTEGRATING ORAL
Status: DISCONTINUED | OUTPATIENT
Start: 2024-03-03 | End: 2024-03-04

## 2024-03-03 RX ORDER — VENLAFAXINE 25 MG/1
25 TABLET ORAL DAILY
Status: DISCONTINUED | OUTPATIENT
Start: 2024-03-04 | End: 2024-03-06

## 2024-03-03 RX ORDER — METHYLPREDNISOLONE SODIUM SUCCINATE 125 MG/2ML
80 INJECTION, POWDER, LYOPHILIZED, FOR SOLUTION INTRAMUSCULAR; INTRAVENOUS EVERY 8 HOURS SCHEDULED
Status: DISCONTINUED | OUTPATIENT
Start: 2024-03-03 | End: 2024-03-03

## 2024-03-03 RX ORDER — METHYLPREDNISOLONE SODIUM SUCCINATE 125 MG/2ML
80 INJECTION, POWDER, LYOPHILIZED, FOR SOLUTION INTRAMUSCULAR; INTRAVENOUS EVERY 8 HOURS SCHEDULED
Status: DISCONTINUED | OUTPATIENT
Start: 2024-03-03 | End: 2024-03-04

## 2024-03-03 RX ORDER — POTASSIUM CHLORIDE 20MEQ/15ML
20 LIQUID (ML) ORAL ONCE
Qty: 15 ML | Refills: 0 | Status: COMPLETED | OUTPATIENT
Start: 2024-03-03 | End: 2024-03-03

## 2024-03-03 RX ORDER — POTASSIUM CHLORIDE 20MEQ/15ML
40 LIQUID (ML) ORAL ONCE
Qty: 30 ML | Refills: 0 | Status: COMPLETED | OUTPATIENT
Start: 2024-03-03 | End: 2024-03-03

## 2024-03-03 RX ORDER — HYDRALAZINE HYDROCHLORIDE 20 MG/ML
5 INJECTION INTRAMUSCULAR; INTRAVENOUS EVERY 6 HOURS PRN
Status: DISCONTINUED | OUTPATIENT
Start: 2024-03-03 | End: 2024-03-03

## 2024-03-03 RX ADMIN — OLANZAPINE 5 MG: 5 TABLET, ORALLY DISINTEGRATING ORAL at 22:36

## 2024-03-03 RX ADMIN — Medication 2 SPRAY: at 02:15

## 2024-03-03 RX ADMIN — GABAPENTIN 100 MG: 250 SOLUTION ORAL at 15:54

## 2024-03-03 RX ADMIN — FUROSEMIDE 20 MG: 10 INJECTION, SOLUTION INTRAMUSCULAR; INTRAVENOUS at 12:15

## 2024-03-03 RX ADMIN — HYDROMORPHONE HYDROCHLORIDE 0.2 MG: 0.2 INJECTION, SOLUTION INTRAMUSCULAR; INTRAVENOUS; SUBCUTANEOUS at 18:14

## 2024-03-03 RX ADMIN — FAMOTIDINE 20 MG: 20 TABLET, FILM COATED ORAL at 08:15

## 2024-03-03 RX ADMIN — POTASSIUM CHLORIDE 40 MEQ: 1.5 SOLUTION ORAL at 08:15

## 2024-03-03 RX ADMIN — LEVALBUTEROL HYDROCHLORIDE 1.25 MG: 1.25 SOLUTION RESPIRATORY (INHALATION) at 13:42

## 2024-03-03 RX ADMIN — NIRMATRELVIR AND RITONAVIR 3 TABLET: KIT at 08:15

## 2024-03-03 RX ADMIN — Medication 2 SPRAY: at 14:05

## 2024-03-03 RX ADMIN — REMDESIVIR 100 MG: 100 INJECTION, POWDER, LYOPHILIZED, FOR SOLUTION INTRAVENOUS at 15:54

## 2024-03-03 RX ADMIN — GABAPENTIN 100 MG: 250 SOLUTION ORAL at 22:35

## 2024-03-03 RX ADMIN — INSULIN LISPRO 4 UNITS: 100 INJECTION, SOLUTION INTRAVENOUS; SUBCUTANEOUS at 12:15

## 2024-03-03 RX ADMIN — Medication 2 SPRAY: at 17:59

## 2024-03-03 RX ADMIN — HYDRALAZINE HYDROCHLORIDE 10 MG: 20 INJECTION, SOLUTION INTRAMUSCULAR; INTRAVENOUS at 22:17

## 2024-03-03 RX ADMIN — NIRMATRELVIR AND RITONAVIR 3 TABLET: KIT at 17:54

## 2024-03-03 RX ADMIN — INSULIN GLARGINE 10 UNITS: 100 INJECTION, SOLUTION SUBCUTANEOUS at 08:14

## 2024-03-03 RX ADMIN — IPRATROPIUM BROMIDE 0.5 MG: 0.5 SOLUTION RESPIRATORY (INHALATION) at 13:42

## 2024-03-03 RX ADMIN — LABETALOL HYDROCHLORIDE 10 MG: 5 INJECTION, SOLUTION INTRAVENOUS at 10:16

## 2024-03-03 RX ADMIN — ACETAMINOPHEN 1000 MG: 10 INJECTION INTRAVENOUS at 04:48

## 2024-03-03 RX ADMIN — LAMOTRIGINE 150 MG: 100 TABLET ORAL at 08:15

## 2024-03-03 RX ADMIN — POLYETHYLENE GLYCOL 3350 17 G: 17 POWDER, FOR SOLUTION ORAL at 08:17

## 2024-03-03 RX ADMIN — METHYLPREDNISOLONE SODIUM SUCCINATE 80 MG: 125 INJECTION, POWDER, FOR SOLUTION INTRAMUSCULAR; INTRAVENOUS at 18:01

## 2024-03-03 RX ADMIN — INSULIN LISPRO 5 UNITS: 100 INJECTION, SOLUTION INTRAVENOUS; SUBCUTANEOUS at 23:33

## 2024-03-03 RX ADMIN — IPRATROPIUM BROMIDE 0.5 MG: 0.5 SOLUTION RESPIRATORY (INHALATION) at 20:05

## 2024-03-03 RX ADMIN — VENLAFAXINE 25 MG: 25 TABLET ORAL at 12:15

## 2024-03-03 RX ADMIN — CHLORHEXIDINE GLUCONATE 0.12% ORAL RINSE 15 ML: 1.2 LIQUID ORAL at 22:35

## 2024-03-03 RX ADMIN — INSULIN LISPRO 3 UNITS: 100 INJECTION, SOLUTION INTRAVENOUS; SUBCUTANEOUS at 00:03

## 2024-03-03 RX ADMIN — LEVALBUTEROL HYDROCHLORIDE 1.25 MG: 1.25 SOLUTION RESPIRATORY (INHALATION) at 07:29

## 2024-03-03 RX ADMIN — HYDROMORPHONE HYDROCHLORIDE 0.2 MG: 0.2 INJECTION, SOLUTION INTRAMUSCULAR; INTRAVENOUS; SUBCUTANEOUS at 23:26

## 2024-03-03 RX ADMIN — IPRATROPIUM BROMIDE 0.5 MG: 0.5 SOLUTION RESPIRATORY (INHALATION) at 07:29

## 2024-03-03 RX ADMIN — PANTOPRAZOLE SODIUM 40 MG: 40 INJECTION, POWDER, FOR SOLUTION INTRAVENOUS at 22:35

## 2024-03-03 RX ADMIN — LABETALOL HYDROCHLORIDE 10 MG: 5 INJECTION, SOLUTION INTRAVENOUS at 15:54

## 2024-03-03 RX ADMIN — Medication 2 SPRAY: at 09:56

## 2024-03-03 RX ADMIN — HYDRALAZINE HYDROCHLORIDE 5 MG: 20 INJECTION, SOLUTION INTRAMUSCULAR; INTRAVENOUS at 17:10

## 2024-03-03 RX ADMIN — LAMOTRIGINE 150 MG: 100 TABLET ORAL at 17:53

## 2024-03-03 RX ADMIN — LABETALOL HYDROCHLORIDE 10 MG: 5 INJECTION, SOLUTION INTRAVENOUS at 20:14

## 2024-03-03 RX ADMIN — LEVALBUTEROL HYDROCHLORIDE 1.25 MG: 1.25 SOLUTION RESPIRATORY (INHALATION) at 20:05

## 2024-03-03 RX ADMIN — INSULIN LISPRO 3 UNITS: 100 INJECTION, SOLUTION INTRAVENOUS; SUBCUTANEOUS at 05:44

## 2024-03-03 RX ADMIN — POTASSIUM CHLORIDE 20 MEQ: 1.5 SOLUTION ORAL at 08:17

## 2024-03-03 RX ADMIN — NYSTATIN: 100000 POWDER TOPICAL at 17:54

## 2024-03-03 RX ADMIN — SENNOSIDES 8.6 MG: 8.6 TABLET, FILM COATED ORAL at 17:53

## 2024-03-03 RX ADMIN — SENNOSIDES 8.6 MG: 8.6 TABLET, FILM COATED ORAL at 08:15

## 2024-03-03 RX ADMIN — CHLORHEXIDINE GLUCONATE 0.12% ORAL RINSE 15 ML: 1.2 LIQUID ORAL at 08:15

## 2024-03-03 RX ADMIN — INSULIN LISPRO 4 UNITS: 100 INJECTION, SOLUTION INTRAVENOUS; SUBCUTANEOUS at 17:53

## 2024-03-03 RX ADMIN — PANTOPRAZOLE SODIUM 40 MG: 40 INJECTION, POWDER, FOR SOLUTION INTRAVENOUS at 12:15

## 2024-03-03 RX ADMIN — VENLAFAXINE 25 MG: 25 TABLET ORAL at 08:17

## 2024-03-03 RX ADMIN — TOPIRAMATE 100 MG: 100 TABLET, FILM COATED ORAL at 17:53

## 2024-03-03 RX ADMIN — Medication 2 SPRAY: at 05:44

## 2024-03-03 RX ADMIN — DEXMEDETOMIDINE HYDROCHLORIDE 0.5 MCG/KG/HR: 4 INJECTION, SOLUTION INTRAVENOUS at 04:37

## 2024-03-03 RX ADMIN — NYSTATIN: 100000 POWDER TOPICAL at 08:16

## 2024-03-03 RX ADMIN — VENLAFAXINE 25 MG: 25 TABLET ORAL at 15:54

## 2024-03-03 RX ADMIN — METHYLPREDNISOLONE SODIUM SUCCINATE 125 MG: 125 INJECTION, POWDER, FOR SOLUTION INTRAMUSCULAR; INTRAVENOUS at 08:16

## 2024-03-03 RX ADMIN — TOPIRAMATE 100 MG: 100 TABLET, FILM COATED ORAL at 08:15

## 2024-03-03 RX ADMIN — Medication 2 SPRAY: at 22:20

## 2024-03-03 RX ADMIN — GABAPENTIN 100 MG: 250 SOLUTION ORAL at 08:14

## 2024-03-03 NOTE — PROGRESS NOTES
[facility] [facility]  Progress Note: Critical Care  Name: Carla Hunt 59 y.o. 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  Anemia  Hypernatremia  B-cell lymphoma with history of treatment with Rituxan  Minimal SAH with no neurosurgical intervention indicated at the time  Seizure disorder s/p left temporal lobe partial resection  Steroid-induced hyperglycemia  Sacral ulcers bilaterally       Neuro:   Sedation: On Precedex 0.5 at this time  -Continue to aim to wean sedation     Diagnosis: Analgesia  -On oxycodone 2.5 mg and 5 mg every 4 hours PRN  -On dilaudid 0.2 mg Q2H PRN     Diagnosis: seizure disorder  -S/p partial left temporal lobe resection in 2007  -On Lamictal 150 bid and Topamax 100 bid     Diagnosis: Anxiety  -On venlafaxine 25 mg TID via NGT  -Currently on Zyprexa 5 mg ODT every 12 hours  -Gabapentin 100 mg TID     CV:   Diagnosis: Distributive shock, resolved  -Off pressors  -Maintain MAPs > 65 mmHg     Pulm:  Diagnosis: Acute hypoxic respiratory failure due to severe covid  and covid induced fibrosis  -Completed severe COVID pathway and 7 days CTX initially. Then completed 5 day course of remdesevir in late February for Covid19 positive PCR from bronchoscopic cultures. Now covid positive with antigen testing  -S/p Bronch 2/2, 02/16, 02/21  -PCP and AFB negative   -Legionella, viral, fungal cultures no growth to date  -Pulse dose steroids with solumedrol 1g daily x3 days (completed 2/7) then transitioned  to prednisone 80mg daily on 2/8. Also completed veletri treatment and 5 day course of IVIG.   -CT scan 2/24: general improvement of areas of consolidation and some areas of groundglass opacification on the lungs, still with significant groundglass opacification especially in the lower lobes. Bilateral consolidations in lower lobes. No evidence of PE.  -Currently on solumedrol 125 mg every 12 hours, can consider taper to 60 mg Q8H   -Planning for CRP and D-dimer every 48 hours  -CRP trend  338.1 (02/26) > 291 (02/27) > 133 (02/28) > 63.1 (02/29) > 46 (03/02)  -Extubated 03/01. Now on bipap. 10/6 70%.     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  -Monitor output of ostomy pouch and Hemoglobin  -Hold tube feeds for now due to concern for bleeding in ostomy pouch.  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) though unclear how this was diagnosed, this also does not enirely fit as she is NOT hypokalemic  -Continue to monitor I/O and UOP     Diagnosis: Hypernatremia  -Can consider increasing free water flushes to 300 mL every 6 hours  -Currently on free water flushes 200 mL every 6 hours     Diagnosis: Acute kidney injury, sCr at 0.99, resolved     F/E/N:   F: Off IVF  E: monitor and replete for goal K>4, P>3, Mg>2  N: On tube feeds with glucerna with free water flushes 200 mL every 6 hours     Heme/Onc:   Diagnosis: Anemia  Likely multifactorial in nature, acute in the setting of recent sepsis/inflammatory state complicated by chronic anemia due to her other history of lymphoma and CKD  -Hgb at 7.8 this morning  -She did receive 1 unit transfusion on 02/29     Diagnosis: Thrombocytopenia, improving  -Will continue to monitor  -Monitor sites for bleeding  -Platelet levels at 144 this  morning     Diagnosis: B cell lymphoma  -Follows with heme/onc at [facility]  -On rituxan for 2 year course, started May 2022  -Last dose was 12/20, treatment currently on hold   -Leukopenic on admission but WBC now wnl     DVT ppx with lovenox     Endo:   Diagnosis: steroid hyperglycemia and diabetes mellitus type 2, A1c at 6.6 from 01/2024  -Goal -180  -BG range last 24hrs   -On  SSI #3  -Added lantus 10 units qAM     ID:   Diagnosis: Severe covid pneumonia and stenotrophomonas pneumonia  -Completed severe COVID pathway with decadron (ended 1/22) and remdesivir (ended 1/20), also completed 7 days CTX on 1/15  -C/f opportunistic infections given immunocompromised status on chemotherapy and recent steroid use  -No consolidations on CXR and procal negative  -S/p bactrim and minocycline x5 days for stenotrophomonas on sputum culture (1/25), then on bactrim for PJP ppx  -Bronc on 2/2 - Cx w/o growth (initially resulted as 1+ alpha hemolytic strep), AFB & PCP negative, f/u fungal, legionella, and viral cultures   -UA with moderate leukocytes, nitrite negative, 30-50 WBC, trace protein.  -Repeat sputum culture 2/9 with 4+ stenotrophomonas  -Bronch cultures with candida albicans, Rare gram positive cocci in pairs  -Previously completed 14 day course of antibiotics for stenotrophomonas pneumonia  -Levaquin continued for an additional 7 days since bronchial cultures with pseudomonas MDR susceptible to levaquin. Levaquin discontinued at this point since patient has likely completed >7 days of therapy for pseudomonas susceptible to antibiotics.  -Currently off of antibiotics, on remdesevir and paxlovid for Antigen positive test for covid     MSK/Skin:   -Wound care team following for bilateral sacral wounds    Disposition: Critical care    ICU Core Measures     A: Assess, Prevent, and Manage Pain Has pain been assessed? Yes  Need for changes to pain regimen? No   B: Both SAT/SAT  N/A   C: Choice of Sedation RASS Goal: 0 Alert  and Calm  Need for changes to sedation or analgesia regimen? Yes   D: Delirium CAM-ICU: Negative   E: Early Mobility  Plan for early mobility? Yes   F: Family Engagement Plan for family engagement today? Yes       Review of Invasive Devices:    Ortiz Plan: Continue for accurate I/O monitoring for 48 hours  Central access plan: Medications requiring central line  Amelia Plan: Keep arterial line for frequent ABGs    Prophylaxis:  VTE VTE covered by:  enoxaparin, Subcutaneous, 40 mg at 03/02/24 0828       Stress Ulcer  covered byfamotidine (PEPCID) tablet 20 mg [[id number]]        Significant 24hr Events     24hr events: On bipap overnight. Antigen test positive yesterday for covid, on rem + paxlovid. Precedex up to 0.7, got 1 prn dilaudid. In the morning, precedex down to 0.5. Noted to have bloody clots in her ostomy pouch this morning.      Subjective   Seen by bedside, will discuss medical updates with patients family today.     Review of Systems   Unable to perform ROS: Other (Patient wearing bipap mask)        Objective                            Vitals I/O      Most Recent Min/Max in 24hrs   Temp 99.7 °F (37.6 °C) Temp  Min: 97.9 °F (36.6 °C)  Max: 101.3 °F (38.5 °C)   Pulse (!) 110 Pulse  Min: 95  Max: 142   Resp (!) 23 Resp  Min: 20  Max: 39   /92 BP  Min: 91/54  Max: 159/98   O2 Sat 99 % SpO2  Min: 89 %  Max: 100 %      Intake/Output Summary (Last 24 hours) at 3/3/2024 0655  Last data filed at 3/3/2024 0600  Gross per 24 hour   Intake 1336.71 ml   Output 3905 ml   Net -2568.29 ml       Diet Enteral/Parenteral; Tube Feeding No Oral Diet; Glucerna 1.2; Continuous; 20; Prosource Protein Liquid - One Packet; 200; Water; Every 6 hours    Invasive Monitoring   Arterial Line  Amelia /73  Arterial Line BP  Min: 106/50  Max: 203/70    mmHg  Arterial Line MAP (mmHg)  Min: 66 mmHg  Max: 109 mmHg           Physical Exam   Physical Exam  Vitals reviewed.   Skin:     General: Skin is warm.      Capillary  Refill: Capillary refill takes less than 2 seconds.   HENT:      Head: Normocephalic.   Cardiovascular:      Rate and Rhythm: Regular rhythm. Tachycardia present.      Pulses: Normal pulses.   Abdominal: General: Bowel sounds are normal.      Palpations: Abdomen is soft.   Constitutional:       Comments: SCD boots noted. Has a left nare NG tube. On bipap. Has a left sided arterial line. Ostomy pouch with dark red clots noted.   Pulmonary:      Effort: Pulmonary effort is normal.      Breath sounds: Normal breath sounds.   Neurological:      Comments: Able to follow simple commands intermittently. Able to move extremities spontaneously.    Genitourinary/Anorectal:  Ortiz present.          Diagnostic Studies      EKG: Reviewed  Imaging: Reviewed I have personally reviewed pertinent reports.       Medications:  Scheduled PRN   Albumin 5%, 12.5 g, Once  chlorhexidine, 15 mL, Q12H SARTHAK  enoxaparin, 40 mg, Q24H SARTHAK  famotidine, 20 mg, BID  gabapentin, 100 mg, TID  insulin glargine, 10 Units, QAM  insulin lispro, 1-6 Units, Q6H SARTHAK  ipratropium, 0.5 mg, TID  lamoTRIgine, 150 mg, BID  levalbuterol, 1.25 mg, TID  methylPREDNISolone sodium succinate, 125 mg, Q12H SARTHAK  nirmatrelvir & ritonavir, 3 tablet, BID  nystatin, , BID  OLANZapine, 5 mg, Q12H  polyethylene glycol, 17 g, Daily  potassium chloride, 40 mEq, Once   Followed by  potassium chloride, 20 mEq, Once  remdesivir, 100 mg, Q24H  senna, 1 tablet, BID  sodium chloride, 2 spray, Q4H  topiramate, 100 mg, BID  venlafaxine, 25 mg, TID With Meals      acetaminophen, 1,000 mg, Q6H PRN  HYDROmorphone, 0.2 mg, Q2H PRN  labetalol, 10 mg, Q4H PRN  ondansetron, 4 mg, Q6H PRN  oxyCODONE, 2.5 mg, Q4H PRN   Or  oxyCODONE, 5 mg, Q4H PRN  sodium chloride, 1 Application, Q1H PRN       Continuous    dexmedetomidine, 0.1-0.7 mcg/kg/hr, Last Rate: 0.5 mcg/kg/hr (03/03/24 9963)         Labs:    CBC    Recent Labs     03/02/24  0443 03/03/24  0448   WBC 17.93* 14.56*   HGB 8.9* 7.7*   HCT  27.0* 24.9*   * 144*     BMP    Recent Labs     03/02/24  1832 03/03/24  0448   SODIUM 147 150*   K 3.1* 3.4*    112*   CO2 29 32   AGAP 11 6   BUN 24 27*   CREATININE 0.47* 0.53*   CALCIUM 9.5 9.8       Coags    No recent results     Additional Electrolytes  Recent Labs     03/02/24  0510 03/03/24  0448   MG 1.9 2.4   PHOS 1.8* 3.1          Blood Gas    Recent Labs     03/02/24  0438   PHART 7.481*   BAE0PCZ 37.9   PO2ART 77.6   NVP6RQT 27.7   BEART 4.0     No recent results LFTs  Recent Labs     03/02/24  0510 03/03/24  0448   ALT 27 19   AST 20 11*   ALKPHOS 66 60   ALB 3.2* 3.0*   TBILI 0.82 0.81       Infectious  No recent results  Glucose  Recent Labs     03/02/24  0423 03/02/24  0510 03/02/24  1832 03/03/24  0448   GLUC 147* 145* 210* 255*               Miguel Whittaker MD

## 2024-03-03 NOTE — PROGRESS NOTES
Progress Note - Infectious Disease   Carla Hunt 59 y.o. female MRN: [id number]  [facility] Encounter: [id number]      Impression/Recommendations:  SIRS  versus sepsis.  Fluctuating fever, WBC.  Fevers may be multifactorial due to COVID (still positive antigen) versus inflammation (seem to correlate to steroid dosing).  Recent bump in WBC may be due to intensification of steroid dose.  Consider also other nosocomial infections.  Just finished antibiotics for pneumonia.  Recent blood cultures 2/26 negative  Continue to follow closely off antibiotics  Steroids, antivirals as below  If high fevers continue, recheck blood cultures     Recent bacterial pneumonia.  Multiple course of treatment over hospitalization.  Most recent BAL culture with Pseudomonas and stenotrophomonas.  Unclear if pathogens or colonizers.  Status post 10 days levofloxacin.    Observe off further antibiotic.    Monitor temperature/WBC.  Monitor respiratory status.     Severe COVID, present on admission.  Patient was treated with a 10-day course of remdesivir, dexamethasone and was given 1 dose of Tocilizumab on admission.  COVID antigen was negative prior to coming off isolation.  HAd positive COVID culture from BAL 2/19, status post another 5-day course of remdesivir.  Patient has remained on high-dose systemic corticosteroid throughout her hospitalization which were recently intensified 2/26 for fevers, now weaning again.  Persistent fevers, hypoxia.  COVID antigen today positive.  Start remdesevir/paxlovid per persistent COVID guidelines  Check repeat PCR - if positive would have lab run cycle threshold     Acute hypoxic respiratory failure, likely multifactorial, with both COVID and bacterial pneumonia contributing.  Also consider persistent inflammation, fibrosis as seems quite steroid responsive.  Most recently extubated 3/1.  On HFNC 75%>50%.  Restart antivirals as above  Steroids per [facility] - do not escalate dose for  now  Supportive care as per the primary service     Recent cecal volvulus, noted on abdomen/pelvis CT .  Patient is status post exploratory laparotomy with ileocecectomy and end ileostomy creation.  Surgery follow-up.     B-cell lymphoma, on maintenance rituxan, which is currently postponed.       I discussed with Dr. Mar the above plan to continue antivirals, do not escalate steroid dose.  She agrees with the plan.     Antibiotics:  Remdesevir #2  Paxlovid #2    Subjective/24 Hour Events:  Patient seen on AM rounds.  Lethargic.  Daughter at bedside helps provide independent history.  Mouth rivas.  On HFNC but down to 50% FiO2.    Objective:  Vitals:  Temp:  [97.9 °F (36.6 °C)-101.3 °F (38.5 °C)] 98.8 °F (37.1 °C)  HR:  [] 73  Resp:  [17-43] 20  BP: ()/(54-98) 163/92  SpO2:  [89 %-100 %] 97 %  Temp (24hrs), Av.5 °F (37.5 °C), Min:97.9 °F (36.6 °C), Max:101.3 °F (38.5 °C)  Current: Temperature: 98.8 °F (37.1 °C)    Physical Exam:   General:  No acute distress  Psychiatric:  Sleeping  Pulmonary:  Normal respiratory excursion without accessory muscle use  Abdomen:  Soft, nontender  Extremities:  No edema  Skin:  No rashes    Lab Results:  I have personally reviewed pertinent labs.  Results from last 7 days   Lab Units 24  0448 24  1832 24  0510 24  0437 24  1446 24  2301 24  2300   SODIUM mmol/L 150* 147 148*   < > 154*   < >  --    POTASSIUM mmol/L 3.4* 3.1* 3.7   < > 5.3   < >  --    CHLORIDE mmol/L 112* 107 108   < > 114*   < >  --    CO2 mmol/L 32 29 29   < > 29   < >  --    CO2, I-STAT mmol/L  --   --   --   --   --   --  38*   BUN mg/dL 27* 24 28*   < > 33*   < >  --    CREATININE mg/dL 0.53* 0.47* 0.51*   < > 0.64   < >  --    EGFR ml/min/1.73sq m 104 109 106   < > 98   < >  --    GLUCOSE, ISTAT mg/dl  --   --   --   --   --   --  176*   CALCIUM mg/dL 9.8 9.5 10.1   < > 9.5   < >  --    AST U/L 11*  --  20  --  11*   < >  --    ALT U/L 19  --  27  --   16   < >  --    ALK PHOS U/L 60  --  66  --  63   < >  --     < > = values in this interval not displayed.     Results from last 7 days   Lab Units 03/03/24  0448 03/02/24  0443 03/01/24  0538   WBC Thousand/uL 14.56* 17.93* 11.69*   HEMOGLOBIN g/dL 7.7* 8.9* 7.8*   PLATELETS Thousands/uL 144* 115* 89*     Results from last 7 days   Lab Units 02/26/24  1251 02/26/24  1144   BLOOD CULTURE  No Growth After 5 Days. No Growth After 5 Days.       Imaging Studies:   I have personally reviewed pertinent imaging study reports and images in PACS.    EKG, Pathology, and Other Studies:   I have personally reviewed pertinent reports.

## 2024-03-03 NOTE — PLAN OF CARE
Problem: Prexisting or High Potential for Compromised Skin Integrity  Goal: Skin integrity is maintained or improved  Description: INTERVENTIONS:  - Identify patients at risk for skin breakdown  - Assess and monitor skin integrity  - Assess and monitor nutrition and hydration status  - Monitor labs   - Assess for incontinence   - Turn and reposition patient  - Assist with mobility/ambulation  - Relieve pressure over bony prominences  - Avoid friction and shearing  - Provide appropriate hygiene as needed including keeping skin clean and dry  - Evaluate need for skin moisturizer/barrier cream  - Collaborate with interdisciplinary team   - Patient/family teaching  - Consider wound care consult   Outcome: Progressing     Problem: Nutrition/Hydration-ADULT  Goal: Nutrient/Hydration intake appropriate for improving, restoring or maintaining nutritional needs  Description: Monitor and assess patient's nutrition/hydration status for malnutrition. Collaborate with interdisciplinary team and initiate plan and interventions as ordered.  Monitor patient's weight and dietary intake as ordered or per policy. Utilize nutrition screening tool and intervene as necessary. Determine patient's food preferences and provide high-protein, high-caloric foods as appropriate.     INTERVENTIONS:  - Monitor oral intake, urinary output, labs, and treatment plans  - Assess nutrition and hydration status and recommend course of action  - Evaluate amount of meals eaten  - Assist patient with eating if necessary   - Allow adequate time for meals  - Recommend/ encourage appropriate diets, oral nutritional supplements, and vitamin/mineral supplements  - Order, calculate, and assess calorie counts as needed  - Recommend, monitor, and adjust tube feedings and TPN/PPN based on assessed needs  - Assess need for intravenous fluids  - Provide specific nutrition/hydration education as appropriate  - Include patient/family/caregiver in decisions related to  nutrition  Outcome: Progressing     Problem: SAFETY,RESTRAINT: NV/NON-SELF DESTRUCTIVE BEHAVIOR  Goal: Remains free of harm/injury (restraint for non violent/non self-detsructive behavior)  Description: INTERVENTIONS:  - Instruct patient/family regarding restraint use   - Assess and monitor physiologic and psychological status   - Provide interventions and comfort measures to meet assessed patient needs   - Identify and implement measures to help patient regain control  - Assess readiness for release of restraint   Outcome: Progressing  Goal: Returns to optimal restraint-free functioning  Description: INTERVENTIONS:  - Assess the patient's behavior and symptoms that indicate continued need for restraint  - Identify and implement measures to help patient regain control  - Assess readiness for release of restraint   Outcome: Progressing     Problem: INFECTION - ADULT  Goal: Absence or prevention of progression during hospitalization  Description: INTERVENTIONS:  - Assess and monitor for signs and symptoms of infection  - Monitor lab/diagnostic results  - Monitor all insertion sites, i.e. indwelling lines, tubes, and drains  - Monitor endotracheal if appropriate and nasal secretions for changes in amount and color  - [place] appropriate cooling/warming therapies per order  - Administer medications as ordered  - Instruct and encourage patient and family to use good hand hygiene technique  - Identify and instruct in appropriate isolation precautions for identified infection/condition  Outcome: Progressing     Problem: RESPIRATORY - ADULT  Goal: Achieves optimal ventilation and oxygenation  Description: INTERVENTIONS:  - Assess for changes in respiratory status  - Assess for changes in mentation and behavior  - Position to facilitate oxygenation and minimize respiratory effort  - Oxygen administered by appropriate delivery if ordered  - Initiate smoking cessation education as indicated  - Encourage broncho-pulmonary  hygiene including cough, deep breathe, Incentive Spirometry  - Assess the need for suctioning and aspirate as needed  - Assess and instruct to report SOB or any respiratory difficulty  - Respiratory Therapy support as indicated  Outcome: Progressing     Problem: SKIN/TISSUE INTEGRITY - ADULT  Goal: Skin Integrity remains intact(Skin Breakdown Prevention)  Description: Assess:  -Perform Flavio assessment every shift   -Clean and moisturize skin every day   -Inspect skin when repositioning, toileting, and assisting with ADLS  -Assess under medical devices such as ronny  every hour   -Assess extremities for adequate circulation and sensation     Bed Management:  -Have minimal linens on bed & keep smooth, unwrinkled  -Change linens as needed when moist or perspiring  -Avoid sitting or lying in one position for more than 2 hours while in bed  -Keep HOB at 45 degrees     Toileting:  -Offer bedside commode  -Assess for incontinence every hour  -Use incontinent care products after each incontinent episode such as moisture barrier cream     Activity:  -Mobilize patient 3 times a day  -Encourage activity and walks on unit  -Encourage or provide ROM exercises   -Turn and reposition patient every 2 Hours  -Use appropriate equipment to lift or move patient in bed  -Instruct/ Assist with weight shifting every hour when out of bed in chair  -Consider limitation of chair time 2 hour intervals    Skin Care:  -Avoid use of baby powder, tape, friction and shearing, hot water or constrictive clothing  -Relieve pressure over bony prominences using allevyn   -Do not massage red bony areas    Next Steps:  -Teach patient strategies to minimize risks such as weight shifting    -Consider consults to  interdisciplinary teams such as PT  Outcome: Progressing  Goal: Incision(s), wounds(s) or drain site(s) healing without S/S of infection  Description: INTERVENTIONS  - Assess and document dressing, incision, wound bed, drain sites and  surrounding tissue  - Provide patient and family education  - Perform skin care/dressing changes   Outcome: Progressing  Goal: Pressure injury heals and does not worsen  Description: Interventions:  - Implement low air loss mattress or specialty surface (Criteria met)  - Apply silicone foam dressing  - Instruct/assist with weight shifting every 120 minutes when in chair   - Limit chair time to 2 hour intervals  - Use special pressure reducing interventions when in chair   - Apply fecal or urinary incontinence containment device   - Perform passive or active ROM   - Turn and reposition patient & offload bony prominences every 2 hours   - Utilize friction reducing device or surface for transfers   - Consider consults to  interdisciplinary teams   - Use incontinent care products after each incontinent episode   - Consider nutrition services referral as needed  Outcome: Progressing     Problem: NEUROSENSORY - ADULT  Goal: Achieves stable or improved neurological status  Description: INTERVENTIONS  - Monitor and report changes in neurological status  - Monitor vital signs such as temperature, blood pressure, glucose, and any other labs ordered   - Initiate measures to prevent increased intracranial pressure  - Monitor for seizure activity and implement precautions if appropriate      Outcome: Progressing  Goal: Achieves maximal functionality and self care  Description: INTERVENTIONS  - Monitor swallowing and airway patency with patient fatigue and changes in neurological status  - Encourage and assist patient to increase activity and self care.   - Encourage visually impaired, hearing impaired and aphasic patients to use assistive/communication devices  Outcome: Progressing     Problem: CARDIOVASCULAR - ADULT  Goal: Maintains optimal cardiac output and hemodynamic stability  Description: INTERVENTIONS:  - Monitor I/O, vital signs and rhythm  - Monitor for S/S and trends of decreased cardiac output  - Administer and  titrate ordered vasoactive medications to optimize hemodynamic stability  - Assess quality of pulses, skin color and temperature  - Assess for signs of decreased coronary artery perfusion  - Instruct patient to report change in severity of symptoms  Outcome: Progressing  Goal: Absence of cardiac dysrhythmias or at baseline rhythm  Description: INTERVENTIONS:  - Continuous cardiac monitoring, vital signs, obtain 12 lead EKG if ordered  - Administer antiarrhythmic and heart rate control medications as ordered  - Monitor electrolytes and administer replacement therapy as ordered  Outcome: Progressing     Problem: GASTROINTESTINAL - ADULT  Goal: Minimal or absence of nausea and/or vomiting  Description: INTERVENTIONS:  - Administer IV fluids if ordered to ensure adequate hydration  - Maintain NPO status until nausea and vomiting are resolved  - Nasogastric tube if ordered  - Administer ordered antiemetic medications as needed  - Provide nonpharmacologic comfort measures as appropriate  - Advance diet as tolerated, if ordered  - Consider nutrition services referral to assist patient with adequate nutrition and appropriate food choices  Outcome: Progressing  Goal: Maintains or returns to baseline bowel function  Description: INTERVENTIONS:  - Assess bowel function  - Encourage oral fluids to ensure adequate hydration  - Administer IV fluids if ordered to ensure adequate hydration  - Administer ordered medications as needed  - Encourage mobilization and activity  - Consider nutritional services referral to assist patient with adequate nutrition and appropriate food choices  Outcome: Progressing  Goal: Maintains adequate nutritional intake  Description: INTERVENTIONS:  - Monitor percentage of each meal consumed  - Identify factors contributing to decreased intake, treat as appropriate  - Assist with meals as needed  - Monitor I&O, weight, and lab values if indicated  - Obtain nutrition services referral as needed  Outcome:  Progressing  Goal: Establish and maintain optimal ostomy function  Description: INTERVENTIONS:  - Assess bowel function  - Encourage oral fluids to ensure adequate hydration  - Administer IV fluids if ordered to ensure adequate hydration   - Administer ordered medications as needed  - Encourage mobilization and activity  - Nutrition services referral to assist patient with appropriate food choices  - Assess stoma site  - Consider wound care consult   Outcome: Progressing  Goal: Oral mucous membranes remain intact  Description: INTERVENTIONS  - Assess oral mucosa and hygiene practices  - Implement preventative oral hygiene regimen  - Implement oral medicated treatments as ordered  - Initiate Nutrition services referral as needed  Outcome: Progressing     Problem: GENITOURINARY - ADULT  Goal: Maintains or returns to baseline urinary function  Description: INTERVENTIONS:  - Assess urinary function  - Encourage oral fluids to ensure adequate hydration if ordered  - Administer IV fluids as ordered to ensure adequate hydration  - Administer ordered medications as needed  - Offer frequent toileting  - Follow urinary retention protocol if ordered  Outcome: Progressing  Goal: Urinary catheter remains patent  Description: INTERVENTIONS:  - Assess patency of urinary catheter  - If patient has a chronic marie, consider changing catheter if non-functioning  - Follow guidelines for intermittent irrigation of non-functioning urinary catheter  Outcome: Progressing     Problem: METABOLIC, FLUID AND ELECTROLYTES - ADULT  Goal: Electrolytes maintained within normal limits  Description: INTERVENTIONS:  - Monitor labs and assess patient for signs and symptoms of electrolyte imbalances  - Administer electrolyte replacement as ordered  - Monitor response to electrolyte replacements, including repeat lab results as appropriate  - Instruct patient on fluid and nutrition as appropriate  Outcome: Progressing  Goal: Fluid balance  maintained  Description: INTERVENTIONS:  - Monitor labs   - Monitor I/O and WT  - Instruct patient on fluid and nutrition as appropriate  - Assess for signs & symptoms of volume excess or deficit  Outcome: Progressing  Goal: Glucose maintained within target range  Description: INTERVENTIONS:  - Monitor Blood Glucose as ordered  - Assess for signs and symptoms of hyperglycemia and hypoglycemia  - Administer ordered medications to maintain glucose within target range  - Assess nutritional intake and initiate nutrition service referral as needed  Outcome: Progressing     Problem: HEMATOLOGIC - ADULT  Goal: Maintains hematologic stability  Description: INTERVENTIONS  - Assess for signs and symptoms of bleeding or hemorrhage  - Monitor labs  - Administer supportive blood products/factors as ordered and appropriate  Outcome: Progressing     Problem: COPING  Goal: Pt/Family able to verbalize concerns and demonstrate effective coping strategies  Description: INTERVENTIONS:  - Assist patient/family to identify coping skills, available support systems and cultural and spiritual values  - Provide emotional support, including active listening and acknowledgement of concerns of patient and caregivers  - Reduce environmental stimuli, as able  - Provide patient education  - Assess for spiritual pain/suffering and initiate spiritual care, including notification of Pastoral Care or [place] based community as needed  - Assess effectiveness of coping strategies  Outcome: Progressing  Goal: Will report anxiety at manageable levels  Description: INTERVENTIONS:  - Administer medication as ordered  - Teach and encourage coping skills  - Provide emotional support  - Assess patient/family for anxiety and ability to cope  Outcome: Progressing     Problem: BEHAVIOR  Goal: Pt/Family maintain appropriate behavior and adhere to behavioral management agreement, if implemented  Description: INTERVENTIONS:  - Assess the family dynamic   - Encourage  verbalization of thoughts and concerns in a socially appropriate manner  - Assess patient/family's coping skills and non-compliant behavior (including use of illegal substances).  - Utilize positive, consistent limit setting strategies supporting safety of patient, staff and others  - Initiate consult with Case Management, Spiritual Care or other ancillary services as appropriate  - If a patient's/visitor's behavior jeopardizes the safety of the patient, staff, or others, refer to organization procedure.   - Notify Security of behavior or suspected illegal substances which indicate the need for search of the patient and/or belongings  - Encourage participation in the decision making process about a behavioral management agreement; implement if patient meets criteria  Outcome: Progressing     Problem: Potential for Falls  Goal: Patient will remain free of falls  Description: INTERVENTIONS:  - Educate patient/family on patient safety including physical limitations  - Instruct patient to call for assistance with activity   - Consult OT/PT to assist with strengthening/mobility   - Keep Call bell within reach  - Keep bed low and locked with side rails adjusted as appropriate  - Keep care items and personal belongings within reach  - Initiate and maintain comfort rounds  - Make Fall Risk Sign visible to staff  - Offer Toileting every 2 Hours, in advance of need  - Initiate/Maintain bed alarm  - Obtain necessary fall risk management equipment: non skid footwear  - Apply yellow socks and bracelet for high fall risk patients  - Consider moving patient to room near nurses station  Outcome: Progressing

## 2024-03-03 NOTE — RESPIRATORY THERAPY NOTE
Resp care   03/03/24 0734   Respiratory Assessment   Assessment Type During-treatment   General Appearance Sleeping   Respiratory Pattern Normal   Chest Assessment Chest expansion symmetrical   Bilateral Breath Sounds Diminished   Cough None   Resp Comments pt found on bipap 16/6/ 70%, rr12, spo2 is 98%, bs are diminished, udn tx given via aerogen, will continue to monitor per resp protocol.   Non-Invasive Information   O2 Interface Device Face mask   Non-Invasive Ventilation Mode BiPAP   $ Continous NIV Subsequent   $ Pulse Oximetry Spot Check Charge Completed   Non-Invasive Settings   FiO2 (%) 70   Temperature (Set) 31   IPAP (cm) 16 cm   EPAP (cm) 6 cm   Rate (Set) 12   Pressure Support (cm H2O) 10   Trigger Sensitivity Flow (lpm) 3   Inspiratory Time (Set) 0.9   Non-Invasive Readings   Heater Temperature (Obs) 31   Skin Intervention Skin intact   Total Rate 24   Vt (mL) (Mech) 646   MAP (Obs) 9.1   MV (Mech) 17   Peak Pressure (Obs) 20   Spontaneous Vt (mL) 664   Leak (lpm) 30   Non-Invasive Alarms   Insp Pressure High (cm H20) 45   Insp Pressure Low (cm H20) 5   MV High (L/min) 25   MV Low (L/min) 2   Vt High (mL) 1100   Vt Low (mL) 250   High Resp Rate (BPM) 45 BPM   Low Resp Rate (BPM) 5 BPM   Apnea Interval (sec) 20   Apnea Rate 12   Apnea Pressure 20   Maintenance   Water bag changed No

## 2024-03-04 LAB
ABO GROUP BLD: NORMAL
ALBUMIN SERPL BCP-MCNC: 3 G/DL (ref 3.5–5)
ALP SERPL-CCNC: 61 U/L (ref 34–104)
ALT SERPL W P-5'-P-CCNC: 17 U/L (ref 7–52)
ANION GAP SERPL CALCULATED.3IONS-SCNC: 8 MMOL/L
ANION GAP SERPL CALCULATED.3IONS-SCNC: 8 MMOL/L
ANISOCYTOSIS BLD QL SMEAR: PRESENT
AST SERPL W P-5'-P-CCNC: 8 U/L (ref 13–39)
BACTERIA UR QL AUTO: ABNORMAL /HPF
BASOPHILS # BLD MANUAL: 0 THOUSAND/UL (ref 0–0.1)
BASOPHILS NFR MAR MANUAL: 0 % (ref 0–1)
BILIRUB SERPL-MCNC: 0.53 MG/DL (ref 0.2–1)
BILIRUB UR QL STRIP: NEGATIVE
BLD GP AB SCN SERPL QL: NEGATIVE
BUDDING YEAST: PRESENT
BUN SERPL-MCNC: 27 MG/DL (ref 5–25)
BUN SERPL-MCNC: 34 MG/DL (ref 5–25)
CALCIUM ALBUM COR SERPL-MCNC: 10.9 MG/DL (ref 8.3–10.1)
CALCIUM SERPL-MCNC: 10.1 MG/DL (ref 8.4–10.2)
CALCIUM SERPL-MCNC: 9.4 MG/DL (ref 8.4–10.2)
CHLORIDE SERPL-SCNC: 119 MMOL/L (ref 96–108)
CHLORIDE SERPL-SCNC: 123 MMOL/L (ref 96–108)
CLARITY UR: CLEAR
CO2 SERPL-SCNC: 28 MMOL/L (ref 21–32)
CO2 SERPL-SCNC: 28 MMOL/L (ref 21–32)
COLOR UR: COLORLESS
CREAT SERPL-MCNC: 0.49 MG/DL (ref 0.6–1.3)
CREAT SERPL-MCNC: 0.54 MG/DL (ref 0.6–1.3)
CRP SERPL QL: 215.2 MG/L
D DIMER PPP FEU-MCNC: 1.47 UG/ML FEU
DACRYOCYTES BLD QL SMEAR: PRESENT
DIFFERENTIAL COMMENT: ABNORMAL
EOSINOPHIL # BLD MANUAL: 0 THOUSAND/UL (ref 0–0.4)
EOSINOPHIL NFR BLD MANUAL: 0 % (ref 0–6)
ERYTHROCYTE [DISTWIDTH] IN BLOOD BY AUTOMATED COUNT: 21.4 % (ref 11.6–15.1)
ERYTHROCYTE [DISTWIDTH] IN BLOOD BY AUTOMATED COUNT: 21.6 % (ref 11.6–15.1)
FUNGUS SPEC CULT: NORMAL
FUNGUS SPEC CULT: NORMAL
GFR SERPL CREATININE-BSD FRML MDRD: 104 ML/MIN/1.73SQ M
GFR SERPL CREATININE-BSD FRML MDRD: 107 ML/MIN/1.73SQ M
GIANT PLATELETS BLD QL SMEAR: PRESENT
GLUCOSE SERPL-MCNC: 106 MG/DL (ref 65–140)
GLUCOSE SERPL-MCNC: 116 MG/DL (ref 65–140)
GLUCOSE SERPL-MCNC: 141 MG/DL (ref 65–140)
GLUCOSE SERPL-MCNC: 169 MG/DL (ref 65–140)
GLUCOSE SERPL-MCNC: 179 MG/DL (ref 65–140)
GLUCOSE SERPL-MCNC: 210 MG/DL (ref 65–140)
GLUCOSE SERPL-MCNC: 226 MG/DL (ref 65–140)
GLUCOSE SERPL-MCNC: 302 MG/DL (ref 65–140)
GLUCOSE SERPL-MCNC: 323 MG/DL (ref 65–140)
GLUCOSE SERPL-MCNC: 347 MG/DL (ref 65–140)
GLUCOSE UR STRIP-MCNC: NEGATIVE MG/DL
HCT VFR BLD AUTO: 24.1 % (ref 34.8–46.1)
HCT VFR BLD AUTO: 25 % (ref 34.8–46.1)
HCT VFR BLD AUTO: 25.2 % (ref 34.8–46.1)
HGB BLD-MCNC: 7.3 G/DL (ref 11.5–15.4)
HGB BLD-MCNC: 7.5 G/DL (ref 11.5–15.4)
HGB BLD-MCNC: 7.6 G/DL (ref 11.5–15.4)
HGB UR QL STRIP.AUTO: NEGATIVE
KETONES UR STRIP-MCNC: NEGATIVE MG/DL
LEUKOCYTE ESTERASE UR QL STRIP: NEGATIVE
LYMPHOCYTES # BLD AUTO: 0 % (ref 14–44)
LYMPHOCYTES # BLD AUTO: 0 THOUSAND/UL (ref 0.6–4.47)
MAGNESIUM SERPL-MCNC: 2.4 MG/DL (ref 1.9–2.7)
MCH RBC QN AUTO: 29.9 PG (ref 26.8–34.3)
MCH RBC QN AUTO: 30.1 PG (ref 26.8–34.3)
MCHC RBC AUTO-ENTMCNC: 30 G/DL (ref 31.4–37.4)
MCHC RBC AUTO-ENTMCNC: 30.2 G/DL (ref 31.4–37.4)
MCV RBC AUTO: 100 FL (ref 82–98)
MCV RBC AUTO: 99 FL (ref 82–98)
MICROCYTES BLD QL AUTO: PRESENT
MONOCYTES # BLD AUTO: 0.17 THOUSAND/UL (ref 0–1.22)
MONOCYTES NFR BLD: 1 % (ref 4–12)
NEUTROPHILS # BLD MANUAL: 16.4 THOUSAND/UL (ref 1.85–7.62)
NEUTS BAND NFR BLD MANUAL: 2 % (ref 0–8)
NEUTS SEG NFR BLD AUTO: 97 % (ref 43–75)
NITRITE UR QL STRIP: NEGATIVE
NON-SQ EPI CELLS URNS QL MICRO: ABNORMAL /HPF
NRBC BLD AUTO-RTO: 1 /100 WBC (ref 0–2)
OVALOCYTES BLD QL SMEAR: PRESENT
PH UR STRIP.AUTO: 8 [PH]
PHOSPHATE SERPL-MCNC: 2.2 MG/DL (ref 2.7–4.5)
PLATELET # BLD AUTO: 251 THOUSANDS/UL (ref 149–390)
PLATELET # BLD AUTO: 266 THOUSANDS/UL (ref 149–390)
PLATELET BLD QL SMEAR: ADEQUATE
PMV BLD AUTO: 12.3 FL (ref 8.9–12.7)
PMV BLD AUTO: 12.5 FL (ref 8.9–12.7)
POIKILOCYTOSIS BLD QL SMEAR: PRESENT
POLYCHROMASIA BLD QL SMEAR: PRESENT
POTASSIUM SERPL-SCNC: 3.2 MMOL/L (ref 3.5–5.3)
POTASSIUM SERPL-SCNC: 3.6 MMOL/L (ref 3.5–5.3)
PROT SERPL-MCNC: 5.4 G/DL (ref 6.4–8.4)
PROT UR STRIP-MCNC: NEGATIVE MG/DL
RBC # BLD AUTO: 2.49 MILLION/UL (ref 3.81–5.12)
RBC # BLD AUTO: 2.54 MILLION/UL (ref 3.81–5.12)
RBC #/AREA URNS AUTO: ABNORMAL /HPF
RBC MORPH BLD: PRESENT
RH BLD: NEGATIVE
SODIUM SERPL-SCNC: 155 MMOL/L (ref 135–147)
SODIUM SERPL-SCNC: 159 MMOL/L (ref 135–147)
SP GR UR STRIP.AUTO: 1.03 (ref 1–1.03)
SPECIMEN EXPIRATION DATE: NORMAL
UROBILINOGEN UR STRIP-ACNC: <2 MG/DL
WBC # BLD AUTO: 16.57 THOUSAND/UL (ref 4.31–10.16)
WBC # BLD AUTO: 17.1 THOUSAND/UL (ref 4.31–10.16)
WBC #/AREA URNS AUTO: ABNORMAL /HPF

## 2024-03-04 PROCEDURE — 81001 URINALYSIS AUTO W/SCOPE: CPT | Performed by: STUDENT IN AN ORGANIZED HEALTH CARE EDUCATION/TRAINING PROGRAM

## 2024-03-04 PROCEDURE — 86900 BLOOD TYPING SEROLOGIC ABO: CPT | Performed by: STUDENT IN AN ORGANIZED HEALTH CARE EDUCATION/TRAINING PROGRAM

## 2024-03-04 PROCEDURE — 94760 N-INVAS EAR/PLS OXIMETRY 1: CPT

## 2024-03-04 PROCEDURE — 86923 COMPATIBILITY TEST ELECTRIC: CPT

## 2024-03-04 PROCEDURE — 85018 HEMOGLOBIN: CPT | Performed by: STUDENT IN AN ORGANIZED HEALTH CARE EDUCATION/TRAINING PROGRAM

## 2024-03-04 PROCEDURE — 85014 HEMATOCRIT: CPT | Performed by: STUDENT IN AN ORGANIZED HEALTH CARE EDUCATION/TRAINING PROGRAM

## 2024-03-04 PROCEDURE — 80053 COMPREHEN METABOLIC PANEL: CPT | Performed by: INTERNAL MEDICINE

## 2024-03-04 PROCEDURE — 85027 COMPLETE CBC AUTOMATED: CPT | Performed by: INTERNAL MEDICINE

## 2024-03-04 PROCEDURE — 94664 DEMO&/EVAL PT USE INHALER: CPT

## 2024-03-04 PROCEDURE — 80048 BASIC METABOLIC PNL TOTAL CA: CPT | Performed by: STUDENT IN AN ORGANIZED HEALTH CARE EDUCATION/TRAINING PROGRAM

## 2024-03-04 PROCEDURE — C9113 INJ PANTOPRAZOLE SODIUM, VIA: HCPCS | Performed by: STUDENT IN AN ORGANIZED HEALTH CARE EDUCATION/TRAINING PROGRAM

## 2024-03-04 PROCEDURE — 86850 RBC ANTIBODY SCREEN: CPT | Performed by: STUDENT IN AN ORGANIZED HEALTH CARE EDUCATION/TRAINING PROGRAM

## 2024-03-04 PROCEDURE — 94640 AIRWAY INHALATION TREATMENT: CPT

## 2024-03-04 PROCEDURE — 99232 SBSQ HOSP IP/OBS MODERATE 35: CPT | Performed by: PHYSICIAN ASSISTANT

## 2024-03-04 PROCEDURE — C9113 INJ PANTOPRAZOLE SODIUM, VIA: HCPCS

## 2024-03-04 PROCEDURE — 99024 POSTOP FOLLOW-UP VISIT: CPT | Performed by: SURGERY

## 2024-03-04 PROCEDURE — 82948 REAGENT STRIP/BLOOD GLUCOSE: CPT

## 2024-03-04 PROCEDURE — 86140 C-REACTIVE PROTEIN: CPT

## 2024-03-04 PROCEDURE — 86901 BLOOD TYPING SEROLOGIC RH(D): CPT | Performed by: STUDENT IN AN ORGANIZED HEALTH CARE EDUCATION/TRAINING PROGRAM

## 2024-03-04 PROCEDURE — 83735 ASSAY OF MAGNESIUM: CPT | Performed by: INTERNAL MEDICINE

## 2024-03-04 PROCEDURE — 84100 ASSAY OF PHOSPHORUS: CPT | Performed by: INTERNAL MEDICINE

## 2024-03-04 PROCEDURE — 99233 SBSQ HOSP IP/OBS HIGH 50: CPT | Performed by: STUDENT IN AN ORGANIZED HEALTH CARE EDUCATION/TRAINING PROGRAM

## 2024-03-04 PROCEDURE — 99291 CRITICAL CARE FIRST HOUR: CPT | Performed by: INTERNAL MEDICINE

## 2024-03-04 PROCEDURE — 85379 FIBRIN DEGRADATION QUANT: CPT

## 2024-03-04 PROCEDURE — 85027 COMPLETE CBC AUTOMATED: CPT | Performed by: STUDENT IN AN ORGANIZED HEALTH CARE EDUCATION/TRAINING PROGRAM

## 2024-03-04 PROCEDURE — 85007 BL SMEAR W/DIFF WBC COUNT: CPT | Performed by: INTERNAL MEDICINE

## 2024-03-04 RX ORDER — PANTOPRAZOLE SODIUM 40 MG/10ML
40 INJECTION, POWDER, LYOPHILIZED, FOR SOLUTION INTRAVENOUS
Status: DISCONTINUED | OUTPATIENT
Start: 2024-03-05 | End: 2024-03-04

## 2024-03-04 RX ORDER — POTASSIUM CHLORIDE 20MEQ/15ML
40 LIQUID (ML) ORAL ONCE
Status: COMPLETED | OUTPATIENT
Start: 2024-03-04 | End: 2024-03-04

## 2024-03-04 RX ORDER — PANTOPRAZOLE SODIUM 40 MG/10ML
40 INJECTION, POWDER, LYOPHILIZED, FOR SOLUTION INTRAVENOUS EVERY 12 HOURS SCHEDULED
Status: DISCONTINUED | OUTPATIENT
Start: 2024-03-04 | End: 2024-03-05

## 2024-03-04 RX ORDER — GABAPENTIN 250 MG/5ML
100 SOLUTION ORAL
Status: DISCONTINUED | OUTPATIENT
Start: 2024-03-04 | End: 2024-03-07

## 2024-03-04 RX ORDER — METHYLPREDNISOLONE SODIUM SUCCINATE 125 MG/2ML
90 INJECTION, POWDER, LYOPHILIZED, FOR SOLUTION INTRAMUSCULAR; INTRAVENOUS EVERY 8 HOURS SCHEDULED
Status: DISCONTINUED | OUTPATIENT
Start: 2024-03-04 | End: 2024-03-05

## 2024-03-04 RX ORDER — VANCOMYCIN HYDROCHLORIDE 1 G/200ML
15 INJECTION, SOLUTION INTRAVENOUS EVERY 12 HOURS
Status: DISCONTINUED | OUTPATIENT
Start: 2024-03-04 | End: 2024-03-04

## 2024-03-04 RX ORDER — POTASSIUM CHLORIDE 29.8 MG/ML
40 INJECTION INTRAVENOUS ONCE
Status: COMPLETED | OUTPATIENT
Start: 2024-03-04 | End: 2024-03-04

## 2024-03-04 RX ORDER — DEXTROSE MONOHYDRATE 50 MG/ML
75 INJECTION, SOLUTION INTRAVENOUS CONTINUOUS
Status: DISPENSED | OUTPATIENT
Start: 2024-03-04 | End: 2024-03-04

## 2024-03-04 RX ORDER — HYDROMORPHONE HCL/PF 1 MG/ML
1 SYRINGE (ML) INJECTION ONCE
Status: COMPLETED | OUTPATIENT
Start: 2024-03-04 | End: 2024-03-04

## 2024-03-04 RX ORDER — OLANZAPINE 10 MG/1
10 TABLET, ORALLY DISINTEGRATING ORAL
Status: DISCONTINUED | OUTPATIENT
Start: 2024-03-04 | End: 2024-03-18

## 2024-03-04 RX ADMIN — SENNOSIDES 8.6 MG: 8.6 TABLET, FILM COATED ORAL at 18:11

## 2024-03-04 RX ADMIN — NIRMATRELVIR AND RITONAVIR 3 TABLET: KIT at 18:15

## 2024-03-04 RX ADMIN — LEVALBUTEROL HYDROCHLORIDE 1.25 MG: 1.25 SOLUTION RESPIRATORY (INHALATION) at 13:22

## 2024-03-04 RX ADMIN — IPRATROPIUM BROMIDE 0.5 MG: 0.5 SOLUTION RESPIRATORY (INHALATION) at 07:59

## 2024-03-04 RX ADMIN — LEVALBUTEROL HYDROCHLORIDE 1.25 MG: 1.25 SOLUTION RESPIRATORY (INHALATION) at 20:16

## 2024-03-04 RX ADMIN — REMDESIVIR 100 MG: 100 INJECTION, POWDER, LYOPHILIZED, FOR SOLUTION INTRAVENOUS at 14:36

## 2024-03-04 RX ADMIN — POTASSIUM CHLORIDE 40 MEQ: 29.8 INJECTION, SOLUTION INTRAVENOUS at 00:53

## 2024-03-04 RX ADMIN — SODIUM CHLORIDE 6 UNITS/HR: 9 INJECTION, SOLUTION INTRAVENOUS at 03:58

## 2024-03-04 RX ADMIN — POLYETHYLENE GLYCOL 3350 17 G: 17 POWDER, FOR SOLUTION ORAL at 08:00

## 2024-03-04 RX ADMIN — SODIUM CHLORIDE 4 UNITS/HR: 9 INJECTION, SOLUTION INTRAVENOUS at 20:17

## 2024-03-04 RX ADMIN — Medication 2 SPRAY: at 01:18

## 2024-03-04 RX ADMIN — Medication 2 SPRAY: at 05:58

## 2024-03-04 RX ADMIN — POTASSIUM CHLORIDE 40 MEQ: 1.5 SOLUTION ORAL at 00:55

## 2024-03-04 RX ADMIN — LAMOTRIGINE 150 MG: 100 TABLET ORAL at 18:11

## 2024-03-04 RX ADMIN — METHYLPREDNISOLONE SODIUM SUCCINATE 80 MG: 125 INJECTION, POWDER, FOR SOLUTION INTRAMUSCULAR; INTRAVENOUS at 05:58

## 2024-03-04 RX ADMIN — POTASSIUM CHLORIDE 40 MEQ: 1.5 SOLUTION ORAL at 08:00

## 2024-03-04 RX ADMIN — HYDROMORPHONE HYDROCHLORIDE 0.5 MG: 1 INJECTION, SOLUTION INTRAMUSCULAR; INTRAVENOUS; SUBCUTANEOUS at 15:48

## 2024-03-04 RX ADMIN — CEFEPIME 2000 MG: 2 INJECTION, POWDER, FOR SOLUTION INTRAVENOUS at 15:48

## 2024-03-04 RX ADMIN — PANTOPRAZOLE SODIUM 40 MG: 40 INJECTION, POWDER, FOR SOLUTION INTRAVENOUS at 08:00

## 2024-03-04 RX ADMIN — IOHEXOL 100 ML: 350 INJECTION, SOLUTION INTRAVENOUS at 12:55

## 2024-03-04 RX ADMIN — Medication 2 SPRAY: at 21:55

## 2024-03-04 RX ADMIN — TOPIRAMATE 100 MG: 100 TABLET, FILM COATED ORAL at 18:11

## 2024-03-04 RX ADMIN — LAMOTRIGINE 150 MG: 100 TABLET ORAL at 10:32

## 2024-03-04 RX ADMIN — ENOXAPARIN SODIUM 40 MG: 40 INJECTION SUBCUTANEOUS at 08:00

## 2024-03-04 RX ADMIN — NYSTATIN: 100000 POWDER TOPICAL at 08:01

## 2024-03-04 RX ADMIN — GABAPENTIN 100 MG: 250 SOLUTION ORAL at 08:00

## 2024-03-04 RX ADMIN — Medication 2 SPRAY: at 14:24

## 2024-03-04 RX ADMIN — NIRMATRELVIR AND RITONAVIR 3 TABLET: KIT at 10:33

## 2024-03-04 RX ADMIN — DEXTROSE 75 ML/HR: 5 SOLUTION INTRAVENOUS at 10:29

## 2024-03-04 RX ADMIN — Medication 2 SPRAY: at 18:12

## 2024-03-04 RX ADMIN — CHLORHEXIDINE GLUCONATE 0.12% ORAL RINSE 15 ML: 1.2 LIQUID ORAL at 08:00

## 2024-03-04 RX ADMIN — NYSTATIN: 100000 POWDER TOPICAL at 18:12

## 2024-03-04 RX ADMIN — LEVALBUTEROL HYDROCHLORIDE 1.25 MG: 1.25 SOLUTION RESPIRATORY (INHALATION) at 07:59

## 2024-03-04 RX ADMIN — VANCOMYCIN HYDROCHLORIDE 1750 MG: 1 INJECTION, POWDER, LYOPHILIZED, FOR SOLUTION INTRAVENOUS at 16:45

## 2024-03-04 RX ADMIN — VENLAFAXINE 25 MG: 25 TABLET ORAL at 10:33

## 2024-03-04 RX ADMIN — Medication 2 SPRAY: at 10:34

## 2024-03-04 RX ADMIN — PANTOPRAZOLE SODIUM 40 MG: 40 INJECTION, POWDER, FOR SOLUTION INTRAVENOUS at 21:55

## 2024-03-04 RX ADMIN — IPRATROPIUM BROMIDE 0.5 MG: 0.5 SOLUTION RESPIRATORY (INHALATION) at 13:22

## 2024-03-04 RX ADMIN — METHYLPREDNISOLONE SODIUM SUCCINATE 90 MG: 125 INJECTION, POWDER, FOR SOLUTION INTRAMUSCULAR; INTRAVENOUS at 14:24

## 2024-03-04 RX ADMIN — SENNOSIDES 8.6 MG: 8.6 TABLET, FILM COATED ORAL at 08:01

## 2024-03-04 RX ADMIN — TOPIRAMATE 100 MG: 100 TABLET, FILM COATED ORAL at 08:00

## 2024-03-04 RX ADMIN — SILVER NITRATE APPLICATORS 2 APPLICATOR: 25; 75 STICK TOPICAL at 17:50

## 2024-03-04 RX ADMIN — METHYLPREDNISOLONE SODIUM SUCCINATE 90 MG: 125 INJECTION, POWDER, FOR SOLUTION INTRAMUSCULAR; INTRAVENOUS at 21:55

## 2024-03-04 RX ADMIN — IPRATROPIUM BROMIDE 0.5 MG: 0.5 SOLUTION RESPIRATORY (INHALATION) at 20:16

## 2024-03-04 RX ADMIN — CHLORHEXIDINE GLUCONATE 0.12% ORAL RINSE 15 ML: 1.2 LIQUID ORAL at 21:55

## 2024-03-04 NOTE — RESPIRATORY THERAPY NOTE
Resp care   03/04/24 0801   Inhalation Therapy Tx   $ Inhalation Therapy Performed Yes   $ Pulse Oximetry Spot Check Charge Completed   SpO2 94 %   Pre-Treatment Pulse 132   Pre-Treatment Respirations 20   Duration 30   Breath Sounds Pre-Treatment Bilateral Clear   Breath Sounds Post-Treatment Bilateral Clear   Post-Treatment Pulse 124   Post-Treatment Respirations 20   Delivery Source Aerogen   Position High Barney's   Treatment Tolerance Tolerated well   Resp Comments pt found on HFNC 50% fio2 50L flow, spo2 is 94%, bs are clear, udn tx given via aerogen, pt is stable, no changes made at this time will cont to monitor per resp protocol.

## 2024-03-04 NOTE — PROGRESS NOTES
Pastoral Care Progress Note    3/4/2024  Patient: Carla Hunt : 1965  Admission Date & Time: 1/10/2024 1941  MRN: [id number] [facility]: [id number]         24 0800   Clinical Encounter Type   Visited With Patient and family together   Faith Encounters   Faith Needs Prayer  (Fr Daugherty made a pastoral visit with pt and spoke with her )

## 2024-03-04 NOTE — PROGRESS NOTES
[facility]  Progress Note  Name: Carla Hunt I  MRN: [id number]  Unit/Bed#: MICU 10 I Date of Admission: 1/10/2024   Date of Service: 3/4/2024 I Hospital Day: 54    Assessment/Plan   Goals of care, counseling/discussion  Assessment & Plan  Code Status: full - Level 1  Decisional apparatus:  Patient is not competent on my exam today.  If competence is lost, patient's substitute decision maker would default to spouse, Min, by [place].  Advance Directive / Living Will / POLST:  none  Patient was extubated to BiPAP on 3/1. Now on HFNC. Goal to continue medical optimization. However, if re-intubated will likely need to readdress trach.  NGT remains in place while patient not appropriate for PO. Hopeful mental status improves allowing speech/swallow eval in the next 24H.   Otherwise, patient's  has mentioned important goal of patient experiencing daughter's wedding later this year if possible.     Palliative care patient  Assessment & Plan  PSC, including MSW support, will follow closely to continue to provide supportive care as clinical situation evolves.   Encouraged patient/family to utilize letter boards for communication while verbal communication and writing may be taxing.  Decisional apparatus:  Patient is not competent on my exam today.  If competence is lost, patient's substitute decision maker would default to spouse by [place] Act 169.  Advance Directive / Living Will / POLST:  None on file, unable to complete  Will return on Monday 3/4 for continuity and support. Page sooner with questions or concerns.     Teto marginal zone B-cell lymphoma (HCC)  Assessment & Plan  Previously on maintenance therapy    * Acute respiratory failure with hypoxia (HCC)  Assessment & Plan  Patient was extubated to BiPAP on 3/1. Now weaned to HFNC.   Very careful steroid management per critical care team.  Patient remains full code. Patient's  understands if she is  re-intubated will need to readdress trach.            Interval history:       Patient was able to wean to HFNC this AM. Waxing and waning mental status. Awake earlier per  but patient drowsy during time of encounter. CT chest abdomen pelvis performed earlier with suspicion of cellulitis surrounding ostomy. ID re-engaged. Surgery also re-engaged given bloody output from ostomy. Carla appears comfortable during time of encounter. , Min, at bedside and is hopeful given oxygen requirement decreasing. Per RN patient was transiently tachycardic and agitated earlier but improved with support from her .    MEDICATIONS / ALLERGIES:     all current active meds have been reviewed    No Known Allergies    OBJECTIVE:    Physical Exam  Physical Exam  Constitutional:       General: She is not in acute distress.  HENT:      Head: Atraumatic.   Abdominal:      Comments: Blood in ostomy bag, slow ooze upon ostomy bag being off.   Skin:     General: Skin is warm and dry.   Neurological:      Comments: Drowsy   Psychiatric:      Comments: calm         Lab Results:   Results from last 7 days   Lab Units 03/04/24  1509 03/04/24  1044 03/04/24  0605 03/03/24  0448 03/02/24  0443 03/01/24  0538   WBC Thousand/uL  --  17.10* 16.57* 14.56*   < > 11.69*   HEMOGLOBIN g/dL 7.3* 7.5* 7.6* 7.7*   < > 7.8*   HEMATOCRIT % 24.1* 25.0* 25.2* 24.9*   < > 25.5*   PLATELETS Thousands/uL  --  266 251 144*   < > 89*   MONO PCT %  --   --  1*  --   --  0*   EOS PCT %  --   --  0  --   --  0    < > = values in this interval not displayed.     Results from last 7 days   Lab Units 03/04/24  0605 03/03/24  2027 03/03/24  0448 03/02/24  1832 03/02/24  0510   POTASSIUM mmol/L 3.6 2.9* 3.4*   < > 3.7   CHLORIDE mmol/L 123* 114* 112*   < > 108   CO2 mmol/L 28 28 32   < > 29   BUN mg/dL 34* 33* 27*   < > 28*   CREATININE mg/dL 0.54* 0.51* 0.53*   < > 0.51*   CALCIUM mg/dL 10.1 9.8 9.8   < > 10.1   ALK PHOS U/L 61  --  60  --  66   ALT U/L  17  --  19  --  27   AST U/L 8*  --  11*  --  20    < > = values in this interval not displayed.       Imaging Studies: reviewed pertinent studies   EKG, Pathology, and Other Studies: reviewed pertinent studies    Counseling / Coordination of Care    Total floor / unit time spent today 25 minutes. Greater than 50% of total time was spent with the patient and / or family counseling and / or coordination of care. A description of the counseling / coordination of care: time spent assessing patient, communicating with RN, primary team, supporting spouse at bedside.

## 2024-03-04 NOTE — PROGRESS NOTES
[facility]  Progress Note: Critical Care  Name: Carla Hunt 59 y.o. 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  -Monitor for withdrawal in context of patients history of seizures     Diagnosis: Analgesia  -On oxycodone 2.5 mg and 5 mg every 4 hours PRN  -On dilaudid 0.2 mg Q2H PRN     Diagnosis: seizure disorder  -S/p partial left temporal lobe resection in 2007  -On Lamictal 150 bid and Topamax 100 bid     Diagnosis: Anxiety  -On venlafaxine 25 mg TID via NGT  -Currently on Zyprexa 5 mg ODT QHS  -Gabapentin 100 mg TID     CV:   Diagnosis: Distributive shock, resolved  -Off pressors  -Maintain MAPs > 65 mmHg     Pulm:  Diagnosis: Acute hypoxic respiratory failure due to severe covid  and covid induced fibrosis  -Completed severe COVID pathway and 7 days CTX initially. Then completed 5 day course of remdesevir in late February for Covid19 positive PCR from bronchoscopic cultures. Now covid positive with antigen testing  -S/p Bronch 2/2, 02/16, 02/21  -PCP and AFB negative   -Legionella, viral, fungal cultures no growth to date  -Pulse dose steroids with solumedrol 1g daily x3 days (completed 2/7) then  transitioned to prednisone 80mg daily on 2/8. Also completed veletri treatment and 5 day course of IVIG.   -CT scan 2/24: general improvement of areas of consolidation and some areas of groundglass opacification on the lungs, still with significant groundglass opacification especially in the lower lobes. Bilateral consolidations in lower lobes. No evidence of PE.  -Currently on solumedrol 125 mg every 12 hours, can consider taper to 60 mg Q8H   -Planning for CRP and D-dimer every 48 hours  -CRP trend  291 (02/27) > 133 (02/28) > 63.1 (02/29) > 46 (03/02) > 215.2 (03/04)  -Extubated 03/01. 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) though unclear how this was diagnosed, this also does not enirely fit as she is NOT hypokalemic  -Continue to monitor I/O and UOP     Diagnosis: Hypernatremia, at 159  -Increased free water flushes with tube feeds to 350 mL every 4 hours  -May have to consider addition of D5W fluid or DDAVP for a few hours    Diagnosis: Acute kidney injury, sCr at 0.99, resolved    F/E/N:   F: Off IVF  E: monitor and replete for goal K>4, P>3, Mg>2  N: On tube feeds with glucerna with free water flushes 350 mL every 4 hours     Heme/Onc:   Diagnosis: Anemia  Likely multifactorial in nature, acute in the setting of recent sepsis/inflammatory state complicated by chronic anemia due to her other history of lymphoma and CKD  -Hgb at 7.6 this morning  -She did receive 1 unit transfusion on 02/29  -Iron  panel with ferritin 974, iron 10, TIBC 173     Diagnosis: Thrombocytopenia, resolved. At 250s today.  -Will continue to monitor  -Monitor sites for bleeding  -Platelet levels at  this morning     Diagnosis: B cell lymphoma  -Follows with heme[facility]onc at [facility]  -On rituxan for 2 year course, started May 2022  -Last dose was 12/20, treatment currently on hold   -Leukopenic on admission but WBC now wnl     DVT ppx with lovenox     Endo:   Diagnosis: steroid hyperglycemia and diabetes mellitus type 2, A1c at 6.6 from 01/2024  -Goal -180  -BG range last 24hrs   -Switched to insulin drip     ID:   Diagnosis: Severe covid pneumonia and stenotrophomonas pneumonia  -Completed severe COVID pathway with decadron (ended 1/22) and remdesivir (ended 1/20), also completed 7 days CTX on 1/15  -C/f opportunistic infections given immunocompromised status on chemotherapy and recent steroid use  -No consolidations on CXR and procal negative  -S/p bactrim and minocycline x5 days for stenotrophomonas on sputum culture (1/25), then on bactrim for PJP ppx  -Bronc on 2/2 - Cx w/o growth (initially resulted as 1+ alpha hemolytic strep), AFB & PCP negative, f/u fungal, legionella, and viral cultures   -UA with moderate leukocytes, nitrite negative, 30-50 WBC, trace protein.  -Repeat sputum culture 2/9 with 4+ stenotrophomonas  -Bronch cultures with candida albicans, Rare gram positive cocci in pairs  -Previously completed 14 day course of antibiotics for stenotrophomonas pneumonia  -Levaquin continued for an additional 7 days since bronchial cultures with pseudomonas MDR susceptible to levaquin. Levaquin discontinued at this point since patient has likely completed >7 days of therapy for pseudomonas susceptible to antibiotics.  -Currently off of antibiotics, on remdesevir and paxlovid for Antigen positive test for covid  -RSV/Flu/Covid panel positive, we are using that to assess the amount of covid viral particle shedding quantitatively as  we continue to treat her with remdesevir and paxlovid     MSK/Skin:   -Wound care team following for bilateral sacral wounds       Disposition: Critical care    ICU Core Measures     A: Assess, Prevent, and Manage Pain Has pain been assessed? Yes  Need for changes to pain regimen? No   B: Both SAT/SAT  N/A   C: Choice of Sedation RASS Goal: N/A patient not on sedation  Need for changes to sedation or analgesia regimen? No   D: Delirium CAM-ICU: Negative   E: Early Mobility  Plan for early mobility? Yes   F: Family Engagement Plan for family engagement today? Yes       Review of Invasive Devices:    Ortiz Plan: Continue for accurate I/O monitoring for 48 hours  Central access plan: Medications requiring central line  Amelia Plan: Keep arterial line for frequent ABGs    Prophylaxis:  VTE VTE covered by:  enoxaparin, Subcutaneous, 40 mg at 03/02/24 0828       Stress Ulcer  covered bypantoprazole (PROTONIX) injection 40 mg [[id number]]        Significant 24hr Events     24hr events: No bipap overnight. Remained on HFNC, Insulin drip started due to sugars>300. K was 2.7, repleted. Drip was started after k repleted.     Subjective   Patient seen by bedside, will reach out to patients family today.    Review of Systems   Unable to perform ROS: Patient nonverbal          Objective                            Vitals I/O      Most Recent Min/Max in 24hrs   Temp 98.4 °F (36.9 °C) Temp  Min: 97 °F (36.1 °C)  Max: 99.4 °F (37.4 °C)   Pulse (!) 109 Pulse  Min: 59  Max: 131   Resp (!) 24 Resp  Min: 15  Max: 43   /73 BP  Min: 131/81  Max: 183/67   O2 Sat 97 % SpO2  Min: 85 %  Max: 100 %   On HFNC 55L, 49% FiO2. Tube feeds running a t20 mL/hour. Insulin drip running.    Intake/Output Summary (Last 24 hours) at 3/4/2024 0715  Last data filed at 3/4/2024 0600  Gross per 24 hour   Intake 1571.79 ml   Output 3245 ml   Net -1673.21 ml       Diet Enteral/Parenteral; Tube Feeding No Oral Diet; Glucerna 1.2; Continuous; 20; Prosource  Protein Liquid - One Packet; 350; Water; Every 4 hours    Invasive Monitoring   Arterial Line  Llano /58  Arterial Line BP  Min: 109/49  Max: 189/70   MAP 87 mmHg  Arterial Line MAP (mmHg)  Min: 67 mmHg  Max: 123 mmHg           Physical Exam   Physical Exam  Vitals reviewed.   Skin:     General: Skin is warm.      Capillary Refill: Capillary refill takes less than 2 seconds.   Cardiovascular:      Rate and Rhythm: Normal rate.      Pulses: Normal pulses.   Abdominal: General: Bowel sounds are normal.      Palpations: Abdomen is soft.   Constitutional:       Appearance: She is ill-appearing.      Interventions: She is sedated.      Comments: Has a left sided arterial line, left CVC, NG tube in left nare, On HFNC. Ortiz in place.    Pulmonary:      Effort: Pulmonary effort is normal.      Breath sounds: Normal breath sounds.   Neurological:      Comments: Able to move her extremities spontaneously. Wakes up to verbal stimuli. Intermittently able to squeeze fingers on command.    Genitourinary/Anorectal:  Ortiz present.          Diagnostic Studies      EKG: Reviewed  Imaging: Reviewed I have personally reviewed pertinent reports.       Medications:  Scheduled PRN   Albumin 5%, 12.5 g, Once  chlorhexidine, 15 mL, Q12H SARTHAK  enoxaparin, 40 mg, Q24H SARTHAK  gabapentin, 100 mg, TID  ipratropium, 0.5 mg, TID  lamoTRIgine, 150 mg, BID  levalbuterol, 1.25 mg, TID  methylPREDNISolone sodium succinate, 80 mg, Q8H SARTHAK  nirmatrelvir & ritonavir, 3 tablet, BID  nystatin, , BID  OLANZapine, 5 mg, HS  pantoprazole, 40 mg, Q12H SARTHAK  polyethylene glycol, 17 g, Daily  potassium chloride, 40 mEq, Once  remdesivir, 100 mg, Q24H  senna, 1 tablet, BID  sodium chloride, 2 spray, Q4H  topiramate, 100 mg, BID  venlafaxine, 25 mg, Daily      acetaminophen, 650 mg, Q6H PRN  hydrALAZINE, 10 mg, Q6H PRN  HYDROmorphone, 0.2 mg, Q2H PRN  ondansetron, 4 mg, Q6H PRN  oxyCODONE, 2.5 mg, Q4H PRN   Or  oxyCODONE, 5 mg, Q4H PRN  sodium chloride, 1  Application, Q1H PRN       Continuous    dexmedetomidine, 0.1-0.7 mcg/kg/hr, Last Rate: Stopped (03/03/24 1555)  insulin regular (HumuLIN R,NovoLIN R) 1 Units/mL in sodium chloride 0.9 % 100 mL infusion, 0.3-21 Units/hr, Last Rate: 3 Units/hr (03/04/24 0609)         Labs:    CBC    Recent Labs     03/03/24  0448 03/04/24  0605   WBC 14.56* 16.57*   HGB 7.7* 7.6*   HCT 24.9* 25.2*   * 251   CRP at 215.2 (previously 46)  D-Dimer at 1.47 (from 1.64) BMP    Recent Labs     03/03/24 2027 03/04/24  0605   SODIUM 153* 159*   K 2.9* 3.6   * 123*   CO2 28 28   AGAP 11 8   BUN 33* 34*   CREATININE 0.51* 0.54*   CALCIUM 9.8 10.1       Coags    No recent results     Additional Electrolytes  Recent Labs     03/03/24  0448 03/04/24  0605   MG 2.4 2.4   PHOS 3.1 2.2*          Blood Gas    No recent results  No recent results LFTs  Recent Labs     03/03/24  0448 03/04/24  0605   ALT 19 17   AST 11* 8*   ALKPHOS 60 61   ALB 3.0* 3.0*   TBILI 0.81 0.53       Infectious  No recent results  Glucose  Recent Labs     03/02/24  1832 03/03/24  0448 03/03/24 2027 03/04/24  0605   GLUC 210* 255* 328* 179*           Imaging: CXR pending    Miguel Whittaker MD

## 2024-03-04 NOTE — PROGRESS NOTES
Vancomycin IV Pharmacy-to-Dose Consultation    Carla Hunt is a 59 y.o. female who is currently receiving Vancomycin IV with management by the Pharmacy Consult service.    Relevant clinical data and objective / subjective history reviewed.      Vancomycin Assessment:  Indication: Pneumonia (goal -600, trough >10)    Status: critically ill  Micro:   - 3/3 COVID: (+)  Procalcitonin: no recent  Renal Function:     Lab Results   Component Value Date    CREATININE 0.54 (L) 03/04/2024     Estimated Creatinine Clearance: 114.6 mL/min (A) (by C-G formula based on SCr of 0.54 mg/dL (L)).  Dialysis: no  Days of Therapy: 1  Current Dose: 1250 mg IV q12h   Goal AUC / Trough: -600, trough >10   Last Level: N/A  Model Fit:  N/A  Assessment: ID consulted. WBC elevated, afebrile.       Vancomycin Plan:  New Dosing: continue current regimen   Predicted Trough / AUC: 10.8 / 464  Next Level: on 3/5 at 0600  Renal Function Monitoring: Daily BMP and UOP      Pharmacy will continue to follow closely for s/sx of nephrotoxicity, infusion reactions and appropriateness of therapy.  BMP and CBC will be ordered per protocol. We will continue to follow the patient’s culture results and clinical progress daily.       Gabe Henriquez, PharmD, BCCCP  Critical Care Clinical Pharmacist  Available via [employer]

## 2024-03-04 NOTE — UTILIZATION REVIEW
Continued Stay Review    Date: 03/03, 03/04                          Current Patient Class: IP  Current Level of Care: Level 2 stepdown/HOT    HPI:59 y.o. female initially admitted on      Assessment/Plan:   03/03/ 2024 Pt placed on BIPAP ovn, weaned to HFNC this am.  Antigen test positive yesterday for covid, on rem + paxlovid. Precedex up to 0.7, got 1 prn dilaudid. In the morning, precedex down to 0.5. Noted to have bloody clots in her ostomy pouch this morning.  Decrease Solu-Medrol to 80 TID starting tomorrow which should be decrease in 10 mg. continue high flow. trend CRP. Change Zyprexa to nightly wean Precedex to off patient's withdrawal symptoms improving. Hold TF d/t concern bleeding in ostomy pouch. Reconsult Surg.  Change to IV Protonix. Lasix 20 mg IV now. Cont Paxlovid and remdesivir. Mon H&H, transfuse prn. Free water replacement when able     03/04/2024 Pt remained on HFNC, cont. Insulin drip started due to sugars>300. K was 2.7, repleted. Drip was started after k repleted. Weaned precedex to off. Mon for w/drawal symptoms. Cont Zyprexa. Monitor ostomy output. Tube feeds restarted. Cont Solu Medrol. Cont Remdesivir, Pacxovid. Cont IV PPI. Na 159 today.   Increased free water flushes with tube feeds to 350 mL every 4 hours. May have to consider addition of D5W fluid or DDAVP for a few hours. Cont to mon Hgb. Switch to Insulin gtt. WBC uptrending, tachycardic up tp 131, started on IV abx.    Vital Signs:   ate/Time Temp Pulse Resp BP MAP (mmHg) Arterial Line BP MAP SpO2 FiO2 (%) Calculated FIO2 (%) - Nasal Cannula O2 Flow Rate (L/min) Nasal Cannula O2 Flow Rate (L/min) O2 Device O2 Interface Device Patient Position - Orthostatic VS   03/04/24 1512 -- 96 24 Abnormal  164/88 116 -- -- 95 % -- -- -- -- -- -- --   03/04/24 1400 -- 117 Abnormal  24 Abnormal  146/77 105 168/68 100 mmHg 90 % -- -- -- -- -- -- --   03/04/24 1326 -- -- -- -- -- -- -- 92 % -- -- -- -- -- VIDHYA calderón --   03/04/24 1209 -- -- --  -- -- -- -- -- 40 240 -- 55 L/min -- -- --   03/04/24 1200 -- 102 18 156/75 105 182/74 112 mmHg 93 % -- -- -- -- -- -- --   03/04/24 1100 99.6 °F (37.6 °C) 116 Abnormal  26 Abnormal  -- -- 174/70 107 mmHg 95 % -- -- -- -- -- -- --   03/04/24 1000 -- 118 Abnormal  31 Abnormal  152/91 115 -- -- 95 % -- -- -- -- -- -- --   03/04/24 0801 -- -- -- -- -- -- -- 94 % -- -- -- -- -- HFNC prongs --   03/04/24 0800 -- 129 Abnormal  28 Abnormal  142/71 100 145/64 93 mmHg 92 % -- -- -- -- -- -- --   03/04/24 0754 99.2 °F (37.3 °C) -- -- -- -- -- -- -- -- -- -- -- -- -- --   03/04/24 0700 -- 109 Abnormal  17 -- -- 160/61 93 mmHg 95 % -- -- -- -- -- -- --   03/04/24 0600 -- 109 Abnormal  24 Abnormal  135/73 99 153/58 87 mmHg 97 % -- -- -- -- -- -- --   03/04/24 0500 -- 107 Abnormal  29 Abnormal  -- -- 109/49 67 mmHg 98 % -- -- -- -- -- -- --   03/04/24 0400 98.4 °F (36.9 °C) 108 Abnormal  24 Abnormal  148/76 106 143/64 90 mmHg 100 % -- -- -- -- -- -- --   03/04/24 0310 -- -- -- -- -- -- -- -- -- -- -- -- -- HFNC prongs --   03/04/24 0300 -- 104 22 -- -- 151/67 96 mmHg 100 % -- -- -- -- -- -- --   03/04/24 0200 98.5 °F (36.9 °C) 91 21 149/74 105 177/74 109 mmHg 99 % -- -- -- -- -- -- --   03/04/24 0100 -- 131 Abnormal  20 -- -- 180/80 116 mmHg 95 % -- -- -- -- -- -- --   03/04/24 0000 -- 82 15 136/87 107 175/73 111 mmHg 98 % -- -- -- -- -- -- --   03/03/24 2300 -- 112 Abnormal  28 Abnormal  -- -- 154/61 91 mmHg 95 % -- -- -- -- -- -- --   03/03/24 2200 -- 100 18 -- -- 184/72 108 mmHg 97 % -- -- -- -- -- -- --   03/03/24 2100 -- 91 28 Abnormal  -- -- 170/65 96 mmHg 97 % -- -- -- -- -- -- --   03/03/24 2007 -- -- -- -- -- -- -- -- -- -- -- -- -- HFNC prongs --   03/03/24 2000 97 °F (36.1 °C) Abnormal  110 Abnormal  17 166/87 120 182/74 110 mmHg 94 % -- -- -- -- -- -- --   03/03/24 1800 -- 82 21 -- -- 184/63 100 mmHg 85 % Abnormal  -- 240 60 L/min 55 L/min High flow nasal cannula -- --   03/03/24 1730 -- 70 21 -- -- 169/56 88 mmHg 89  % Abnormal  -- -- -- -- -- -- --   03/03/24 1710 -- -- -- 183/67 Abnormal  -- -- -- -- -- -- -- -- -- -- --   03/03/24 1700 -- 59 21 -- -- 186/67 104 mmHg 93 % -- 200 50 L/min 45 L/min -- -- --   03/03/24 1630 -- 64 18 -- -- 184/70 110 mmHg 94 % -- -- -- -- -- -- --   03/03/24 1600 99.4 °F (37.4 °C) 78 21 176/76 Abnormal  109 170/65 98 mmHg 90 % -- 220 50 L/min 50 L/min High flow nasal cannula -- --   03/03/24 1553 -- 74 19 -- -- 189/70 107 mmHg 91 % -- -- -- -- -- -- --   03/03/24 1500 -- 70 22 -- -- 183/67 103 mmHg 91 % -- -- -- -- -- -- --   03/03/24 1400 -- 112 Abnormal  32 Abnormal  -- -- 184/84 123 mmHg 92 % -- -- -- -- -- -- --   03/03/24 1344 -- -- -- -- -- -- -- -- 50 -- 50 L/min -- High flow nasal cannula HFNC prongs --   03/03/24 1200 98.8 °F (37.1 °C) 73 20 163/92 119 174/67 106 mmHg 97 % -- -- -- -- -- -- --   03/03/24 1015 -- 128 Abnormal  43 Abnormal  -- -- 189/83 122 mmHg 94 % -- -- -- -- -- -- --   03/03/24 1005 -- 76 21 -- -- 178/70 107 mmHg 93 % -- -- -- -- -- -- --   03/03/24 1000 -- 98 26 Abnormal  -- -- 184/77 115 mmHg 92 % -- -- -- -- High flow nasal cannula -- --   03/03/24 0800 99.4 °F (37.4 °C) 105 23 Abnormal  131/81 101 147/64 89 mmHg 98 % -- -- -- -- BiPAP -- --   03/03/24 0734 -- -- -- -- -- -- -- -- 70 -- -- -- BiPAP Face mask --   03/03/24 0700 -- 115 Abnormal  17 155/98 121 175/78 109 mmHg 99 % -- -- -- -- -- -- --   03/03/24 0600 -- 110 Abnormal  23 Abnormal  139/92 112 167/73 102 mmHg 99 % -- -- -- -- -- -- --   03/03/24 0552 99.7 °F (37.6 °C) 95 26 Abnormal  -- -- 141/61 85 mmHg 98 % -- -- -- -- -- -- --   03/03/24 0500 -- 114 Abnormal  24 Abnormal  150/91 116 154/69 96 mmHg 99 % -- -- -- -- -- -- --   03/03/24 0445 101.3 °F (38.5 °C) Abnormal  113 Abnormal  27 Abnormal  -- -- 142/63 87 mmHg 98 % -- -- -- -- -- -- --   03/03/24 0400 -- 130 Abnormal  20 155/93 119 161/80 107 mmHg 99 % -- -- -- -- -- -- Lying   03/03/24 0300 -- 116 Abnormal  26 Abnormal  147/96 115 -- -- 98 % -- --  -- -- -- Face mask --   03/03/24 0230 -- 125 Abnormal  21 154/98 121 168/78 109 mmHg 97 % -- -- -- -- -- -- --     Pertinent Labs/Diagnostic Results:   03/04  CXR:Line and tube appear satisfactory. Patchy bilateral pulmonary infiltrates which may be pneumonia or pulmonary edema. Possible small left pleural effusion     CT CAP:  Evolving changes in the lungs which represent some combination of evolving pneumonitis secondary to viral COVID infection, improving atelectasis/bacterial infection in the posterior lower lung zones, and developing focal bacterial type pneumonia in the   lateral aspect of the right middle lobe.     Cellulitic edema and subcutaneous gas in the fatty soft tissue surrounding the ostomy site in the right mid abdomen. This probably represents infectious cellulitis and air tracking backward from the ostomy site. No drainable abscess seen.     Small volume of complex ascites again noted in the dependent hemipelvis.    03/03 EKG result:Sinus bradycardia with sinus arrhythmia     Results from last 7 days   Lab Units 03/03/24  1406   SARS-COV-2  Positive*     Results from last 7 days   Lab Units 03/04/24  1509 03/04/24  1044 03/04/24  0605 03/03/24  0448 03/02/24  0443 03/01/24  0538   WBC Thousand/uL  --  17.10* 16.57* 14.56* 17.93* 11.69*   HEMOGLOBIN g/dL 7.3* 7.5* 7.6* 7.7* 8.9* 7.8*   HEMATOCRIT % 24.1* 25.0* 25.2* 24.9* 27.0* 25.5*   PLATELETS Thousands/uL  --  266 251 144* 115* 89*   BANDS PCT %  --   --  2  --   --  3         Results from last 7 days   Lab Units 03/04/24  0605 03/03/24  2027 03/03/24  0448 03/02/24  1832 03/02/24  0510 03/02/24  0510 03/01/24  1614 03/01/24  0538 02/29/24  1753 02/29/24  0429 02/27/24  1939 02/27/24  0437   SODIUM mmol/L 159* 153* 150* 147 148*  --    < > 142   < > 151*   < > 153*   POTASSIUM mmol/L 3.6 2.9* 3.4* 3.1* 3.7  --    < > 4.0   < > 3.2*   < > 3.5   CHLORIDE mmol/L 123* 114* 112* 107 108  --    < > 105   < > 109*   < > 115*   CO2 mmol/L 28 28 32 29 29  " --    < > 33*   < > 34*   < > 33*   ANION GAP mmol/L 8 11 6 11 11  --    < > 4   < > 8   < > 5   BUN mg/dL 34* 33* 27* 24 28*  --    < > 34*   < > 34*   < > 32*   CREATININE mg/dL 0.54* 0.51* 0.53* 0.47* 0.51*  --    < > 0.52*   < > 0.64   < > 0.61   EGFR ml/min/1.73sq m 104 106 104 109 106  --    < > 105   < > 98   < > 100   CALCIUM mg/dL 10.1 9.8 9.8 9.5 10.1  --    < > 10.5*   < > 10.8*   < > 10.1   MAGNESIUM mg/dL 2.4  --  2.4  --   --  1.9  --  2.0  --  2.0  --  2.0   PHOSPHORUS mg/dL 2.2*  --  3.1  --   --  1.8*  --   --   --  1.8*  --  3.3    < > = values in this interval not displayed.     Results from last 7 days   Lab Units 03/04/24  0605 03/03/24  0448 03/02/24  0510   AST U/L 8* 11* 20   ALT U/L 17 19 27   ALK PHOS U/L 61 60 66   TOTAL PROTEIN g/dL 5.4* 5.3* 5.1*   ALBUMIN g/dL 3.0* 3.0* 3.2*   TOTAL BILIRUBIN mg/dL 0.53 0.81 0.82     Results from last 7 days   Lab Units 03/04/24  0949 03/04/24  0608 03/04/24  0347 03/04/24  0101 03/03/24  2332 03/03/24  1752 03/03/24  1145 03/03/24  0542 03/03/24  0002 03/02/24  1829 03/02/24  1352 03/02/24  1010   POC GLUCOSE mg/dl 226* 210* 302* 323* 347* 298* 296* 260* 247* 209* 239* 177*     Results from last 7 days   Lab Units 03/04/24  0605 03/03/24  2027 03/03/24  0448 03/02/24  1832 03/02/24  0510 03/02/24  0423 03/02/24  0006 03/01/24  1614 03/01/24  0538 02/29/24  1753 02/29/24  0429 02/28/24  1818   GLUCOSE RANDOM mg/dL 179* 328* 255* 210* 145* 147* 152* 123 120 166* 136 153*             No results found for: \"BETA-HYDROXYBUTYRATE\"   Results from last 7 days   Lab Units 03/02/24  0438 03/01/24  0720 02/29/24  0826 02/28/24  2158 02/28/24  1818   PH ART  7.481* 7.467* 7.461* 7.449 7.412   PCO2 ART mm Hg 37.9 41.4 44.1* 47.4* 50.0*   PO2 ART mm Hg 77.6 77.5 67.8* 81.5 74.8*   HCO3 ART mmol/L 27.7 29.3* 30.7* 32.1* 31.1*   BASE EXC ART mmol/L 4.0 5.1 6.3 7.3 5.6   O2 CONTENT ART mL/dL 12.9* 11.1* 7.6* 11.4* 13.7*   O2 HGB, ARTERIAL % 94.1 94.0 91.5* 94.1 93.8* "   ABG SOURCE   --   --  Line, Arterial Line, Arterial Line, Arterial                     Results from last 7 days   Lab Units 03/04/24  0605 02/29/24  1157   D-DIMER QUANTITATIVE ug/ml FEU 1.47* 1.64*                 Results from last 7 days   Lab Units 03/03/24  1234   LACTIC ACID mmol/L 1.3                 Results from last 7 days   Lab Units 03/03/24  0448   FERRITIN ng/mL 974*   IRON ug/dL <10*   TIBC ug/dL <173*         Results from last 7 days   Lab Units 03/01/24  0555   UNIT PRODUCT CODE  [id number]   UNIT NUMBER  [id number]   UNITABO  A   UNITRH  NEG   CROSSMATCH  Compatible   UNIT DISPENSE STATUS  Presumed Trans   UNIT PRODUCT VOL mL 350             Results from last 7 days   Lab Units 03/04/24  0605 03/02/24  0006 02/29/24  0429 02/28/24  0430 02/27/24  0143   CRP mg/L 215.2* 46.0* 63.1* 133.9* 291.3*                 Results from last 7 days   Lab Units 03/03/24  1406   INFLUENZA A PCR  Negative   INFLUENZA B PCR  Negative   RSV PCR  Negative           Medications:   Scheduled Medications:  cefepime, 2,000 mg, Intravenous, Q12H  chlorhexidine, 15 mL, Mouth/Throat, Q12H SARTHAK  enoxaparin, 40 mg, Subcutaneous, Q24H SARTHAK  gabapentin, 100 mg, Oral, HS  HYDROmorphone, 1 mg, Intravenous, Once  ipratropium, 0.5 mg, Nebulization, TID  lamoTRIgine, 150 mg, Oral, BID  levalbuterol, 1.25 mg, Nebulization, TID  methylPREDNISolone sodium succinate, 90 mg, Intravenous, Q8H [facility]  nirmatrelvir & ritonavir, 3 tablet, Oral, BID  nystatin, , Topical, BID  pantoprazole, 40 mg, Intravenous, Q12H [facility]  polyethylene glycol, 17 g, Per NG Tube, Daily  remdesivir, 100 mg, Intravenous, Q24H  senna, 1 tablet, Per NG Tube, BID  silver nitrate-potassium nitrate, , ,   sodium chloride, 2 spray, Each Nare, Q4H  topiramate, 100 mg, Per PEG Tube, BID  [START ON 3/5/2024] vancomycin, 1,250 mg, Intravenous, Q12H  vancomycin, 25 mg/kg, Intravenous, Once  venlafaxine, 25 mg, Per NG Tube, Daily      Continuous IV Infusions:  dextrose, 75 mL/hr,  Intravenous, Continuous  insulin regular (HumuLIN R,NovoLIN R) 1 Units/mL in sodium chloride 0.9 % 100 mL infusion, 0.3-21 Units/hr, Intravenous, Titrated  dexmedeTOMIDine (Precedex) 400 mcg in sodium chloride 0.9% 100 mL  Rate: 1.9-13.2 mL/hr Dose: 0.1-0.7 mcg/kg/hr  Weight Dosing Info: 75.5 kg  Freq: Titrated Route: IV  Last Dose: Stopped (03/03/24 1555)  Start: 03/01/24 2045 End: 03/04/24 0952     PRN Meds:  acetaminophen, 650 mg, Oral, Q6H PRN  hydrALAZINE, 10 mg, Intravenous, Q6H PRN  OLANZapine, 10 mg, Oral, HS PRN  ondansetron, 4 mg, Intravenous, Q6H PRN  oxyCODONE, 2.5 mg, Oral, Q4H PRN   Or  oxyCODONE, 5 mg, Oral, Q4H PRN  silver nitrate-potassium nitrate, , ,   sodium chloride, 1 Application, Nasal, Q1H PRN        Discharge Plan: D    Network Utilization Review Department  ATTENTION: Please call with any questions or concerns to [phone] and carefully listen to the prompts so that you are directed to the right person. All voicemails are confidential.   For Discharge needs, contact Care Management DC Support Team at [phone] opt. [phone]  Send all requests for admission clinical reviews, approved or denied determinations and any other requests to dedicated fax number below belonging to the campus where the patient is receiving treatment. List of dedicated fax numbers for the Facilities:  FACILITY NAME UR FAX NUMBER   ADMISSION DENIALS (Administrative/Medical Necessity) [phone]   DISCHARGE SUPPORT TEAM (NETWORK) [phone]   PARENT CHILD HEALTH (Maternity/NICU/Pediatrics) [phone]   [facility] [phone]   [facility] [phone]   [facility] [phone]   General acute hospital [phone]   Pending sale to [facility] [phone]   [facility] [phone]   [facility] [phone]   [facility]  [phone]   [facility] [phone]   [facility] [phone]   [facility] [phone]   [facility] [phone]

## 2024-03-04 NOTE — ASSESSMENT & PLAN NOTE
Patient was extubated to BiPAP on 3/1. Now weaned to HFNC.   Very careful steroid management per critical care team.  Patient remains full code. Patient's  understands if she is re-intubated will need to readdress trach.

## 2024-03-04 NOTE — ASSESSMENT & PLAN NOTE
Code Status: full - Level 1  Decisional apparatus:  Patient is not competent on my exam today.  If competence is lost, patient's substitute decision maker would default to spouse, Min, by [place] Act 169.  Advance Directive / Living Will / POLST:  none  Patient was extubated to BiPAP on 3/1. Now on HFNC. Goal to continue medical optimization. However, if re-intubated will likely need to readdress trach.  NGT remains in place while patient not appropriate for PO. Hopeful mental status improves allowing speech/swallow eval in the next 24H.   Otherwise, patient's  has mentioned important goal of patient experiencing daughter's wedding later this year if possible.

## 2024-03-04 NOTE — PROGRESS NOTES
Progress Note - Infectious Disease   Carla Hunt 59 y.o. female MRN: [id number]  [facility]: [id number]      Impression/Plan:    1. SIRS  versus sepsis.  Fluctuating fever, WBC.  Fevers may be multifactorial due to COVID (still positive antigen and PCR) versus inflammation (seem to correlate to steroid dosing).  Also consider developing bacterial infection.  CRP is elevated today and steroid dosing was increased.  Recent blood cultures 2/26 negative.  CT C/A/P shows cellulitic and subcutaneous gas around the ostomy site and end ileostomy is dusky.  CT chest also shows ongoing changes of pneumonitis with possible developing bacterial pneumonia in the lateral right middle lobe.  -Continue steroids, antivirals for COVID as below  -Start IV vancomycin dosing by pharmacy and cefepime 2 g every 8 hours for possible infection on the ostomy versus developing pneumonia  -Check sputum culture if able  -Monitor abdominal exam  -Surgery is following  -Repeat CBC, CRP tomorrow to monitor treatment response  -Monitor fever curve, hemodynamics     2. Recent bacterial pneumonia.  The patient has completed multiple treatment courses over the hospitalization.  Most recent BAL culture with Pseudomonas and stenotrophomonas.  Unclear if pathogens or colonizers.  Status post 10 days levofloxacin.  CT C/A/P showed evolving changes representing combination of evolving pneumonitis secondary to viral COVID infection, improving bacterial infection in the posterior lower lung zones, developing consolidation in the lateral aspect of the right middle lobe concerning for bacterial infection.  Patient currently on high flow nasal cannula   -Start IV cefepime and vancomycin as above   -If worsening respiratory status, switch IV cefepime to IV meropenem   -Check sputum culture   -Wean O2 as able     3. Severe COVID, present on admission.  Patient was treated with a 10-day course of remdesivir, dexamethasone and was given 1  dose of Tocilizumab on admission.  COVID antigen was negative prior to coming off isolation.  Had positive COVID PCR from BAL 2/16, status post another 5-day course of remdesivir.  Patient has remained on high-dose systemic corticosteroid throughout her hospitalization which were recently intensified 2/26 for fevers.  Patient having persistent fevers, hypoxia.  COVID antigen positive and PCR is also +3/3  -Continue remdesivir/Paxlovid per persistent COVID guidelines, treat x 10-14 days based on clinical and laboratory response  -working with lab to report cycle threshold     4. Acute hypoxic respiratory failure, likely multifactorial, with both COVID and bacterial pneumonia contributing.  Also consider persistent inflammation, fibrosis as seems quite steroid responsive.  Most recently extubated 3/1.  On HFNC  -Antivirals as above  -steroids per critical care  -repeat CRP tomorrow     5. 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  Patient is status post exploratory laparotomy with ileocecectomy and end ileostomy creation 2/20.  CT now concerning for cellulitic changes around the ostomy.  End ileostomy is dusky but viable  -Surgery follow-up  -Antibiotics as above     B-cell lymphoma, on maintenance rituxan, which is currently postponed.  Rituximab is a risk factor for persistent COVID infection.    Above management plan to continue antivirals, start antibiotics discussed with the critical care resident Dr. Whittaker who is in agreement. Discussed with patient's  at bedside. ID will follow.    Antibiotics:  Day 3 Paxlovid/remdesivir    Subjective:  Patient remains on high flow nasal cannula.  Not requiring vasopressor support.  Per the patient's , she is more awake today although mental status is waxing and waning.  The patient is having fever up to 101 although fever curve is improving after reinitiation of COVID treatments.    Objective:  Vitals:  Temp:  [97 °F (36.1 °C)-99.6 °F (37.6 °C)]  99.6 °F (37.6 °C)  HR:  [] 96  Resp:  [15-31] 24  BP: (135-183)/(67-91) 164/88  SpO2:  [85 %-100 %] 95 %  Temp (24hrs), Av.5 °F (36.9 °C), Min:97 °F (36.1 °C), Max:99.6 °F (37.6 °C)  Current: Temperature: 99.6 °F (37.6 °C)    Physical Exam:   General Appearance:  Ill-appearing, lethargic   Throat: Oropharynx moist without lesions.    Lungs:   Rhonchi bilaterally   Heart:  RRR; no murmur, rub or gallop   Abdomen:   Ostomy present.  No erythema surrounding the ostomy but patient has diffuse abdominal tenderness   Extremities: Tenderness of the left knee joint without effusion or erythema present   Skin: No new rashes or lesions.        Labs:   All pertinent labs and imaging studies were personally reviewed  Results from last 7 days   Lab Units 24  1509 24  1044 24  0605 24  0448   WBC Thousand/uL  --  17.10* 16.57* 14.56*   HEMOGLOBIN g/dL 7.3* 7.5* 7.6* 7.7*   PLATELETS Thousands/uL  --  266 251 144*     Results from last 7 days   Lab Units 24  0605 24  2027 24  0448 24  1832 24  0510   SODIUM mmol/L 159* 153* 150*   < > 148*   POTASSIUM mmol/L 3.6 2.9* 3.4*   < > 3.7   CHLORIDE mmol/L 123* 114* 112*   < > 108   CO2 mmol/L 28 28 32   < > 29   BUN mg/dL 34* 33* 27*   < > 28*   CREATININE mg/dL 0.54* 0.51* 0.53*   < > 0.51*   EGFR ml/min/1.73sq m 104 106 104   < > 106   CALCIUM mg/dL 10.1 9.8 9.8   < > 10.1   AST U/L 8*  --  11*  --  20   ALT U/L 17  --  19  --  27   ALK PHOS U/L 61  --  60  --  66    < > = values in this interval not displayed.         Results from last 7 days   Lab Units 24  0605 24  0006 24  0429 24  0430 24  0143   CRP mg/L 215.2* 46.0* 63.1* 133.9* 291.3*     Results from last 7 days   Lab Units 24  0448   FERRITIN ng/mL 974*     Results from last 7 days   Lab Units 24  0605 24  1157   D-DIMER QUANTITATIVE ug/ml FEU 1.47* 1.64*         Imaging:  CT chest, abdomen, pelvis personally  reviewed and shows evolving changes of the lungs representing combination of pneumonitis, developing focal bacterial type pneumonia in the lateral aspect of the right middle lobe, cellulitic edema and subcutaneous gas in the soft tissue surrounding the ostomy site.

## 2024-03-04 NOTE — PROGRESS NOTES
Progress Note - Carla Hunt 59 y.o. female MRN: [id number]    [facility]: [id number]    Wound Check:    Patient seen and examined bedside with nursing, ICU resident, and spouse bedside. Ostomy appliance removed. Ostomy cleansed and examined. Superior mucus fistula is retracted but not below the fascia. End ileostomy is present and tracking inferiorly. Able to digitize both. Superiorly there was an area of oozing that was treated with silver nitrate. The end ileostomy is dusky but viable with productive stool draining.     CT scan from earlier today reviewed. There is no erythema surrounding wounds and midline incision is stapled without concerns for underlying infection. No fluctuance, induration, drainage. Ostomy without surrounding cellulitis on exam. Although end ileostomy does appear dusky, it is viable and productive.     Discussed with ICU, patient, family, and nursing.  Continue to ensure productivity of end ileostomy.  Please alert surgery team for any new developments or concerns including midline wound drainage, surrounding skin changes, or lack of ostomy function. Maintain midline wound staples in interim. Will plan for midline wound staple removal next Monday 3/11 given poor nutritional status and concerns for overall poor wound healing on continued steroids.

## 2024-03-04 NOTE — CONSULTS
Nutrition Follow-Up/Recommendations:    Recalculated estimated nutrition needs now that pt has been extubated.   Recommend goal rate of 65mL/hr continuous for Glucerna 1.2 + one packet Prosource Liquid Protein daily.   This would provide 1932 kcals (2238 total calories including current IV fluids), 109g protein, 1256mL water.     Adjustments in additional free water flushes per critical care.     Do not have 2 criteria for malnutrition classification at this time. Overall, weight is down 15 lbs / 8.8% since admission (x2 months) which is clinically significant for timeframe, however pt has been intermittently receiving diuretics so possibly some influence of fluid loss.

## 2024-03-05 LAB
ANION GAP SERPL CALCULATED.3IONS-SCNC: 11 MMOL/L
ANION GAP SERPL CALCULATED.3IONS-SCNC: 7 MMOL/L
BUN SERPL-MCNC: 26 MG/DL (ref 5–25)
BUN SERPL-MCNC: 28 MG/DL (ref 5–25)
CALCIUM SERPL-MCNC: 9.3 MG/DL (ref 8.4–10.2)
CALCIUM SERPL-MCNC: 9.5 MG/DL (ref 8.4–10.2)
CHLORIDE SERPL-SCNC: 114 MMOL/L (ref 96–108)
CHLORIDE SERPL-SCNC: 121 MMOL/L (ref 96–108)
CO2 SERPL-SCNC: 24 MMOL/L (ref 21–32)
CO2 SERPL-SCNC: 27 MMOL/L (ref 21–32)
CREAT SERPL-MCNC: 0.49 MG/DL (ref 0.6–1.3)
CREAT SERPL-MCNC: 0.63 MG/DL (ref 0.6–1.3)
CRP SERPL QL: 97.3 MG/L
ERYTHROCYTE [DISTWIDTH] IN BLOOD BY AUTOMATED COUNT: 21.3 % (ref 11.6–15.1)
GFR SERPL CREATININE-BSD FRML MDRD: 107 ML/MIN/1.73SQ M
GFR SERPL CREATININE-BSD FRML MDRD: 99 ML/MIN/1.73SQ M
GLUCOSE SERPL-MCNC: 103 MG/DL (ref 65–140)
GLUCOSE SERPL-MCNC: 123 MG/DL (ref 65–140)
GLUCOSE SERPL-MCNC: 126 MG/DL (ref 65–140)
GLUCOSE SERPL-MCNC: 131 MG/DL (ref 65–140)
GLUCOSE SERPL-MCNC: 136 MG/DL (ref 65–140)
GLUCOSE SERPL-MCNC: 139 MG/DL (ref 65–140)
GLUCOSE SERPL-MCNC: 146 MG/DL (ref 65–140)
GLUCOSE SERPL-MCNC: 158 MG/DL (ref 65–140)
GLUCOSE SERPL-MCNC: 166 MG/DL (ref 65–140)
GLUCOSE SERPL-MCNC: 173 MG/DL (ref 65–140)
GLUCOSE SERPL-MCNC: 177 MG/DL (ref 65–140)
GLUCOSE SERPL-MCNC: 187 MG/DL (ref 65–140)
GLUCOSE SERPL-MCNC: 190 MG/DL (ref 65–140)
GLUCOSE SERPL-MCNC: 198 MG/DL (ref 65–140)
GLUCOSE SERPL-MCNC: 198 MG/DL (ref 65–140)
GLUCOSE SERPL-MCNC: 227 MG/DL (ref 65–140)
GLUCOSE SERPL-MCNC: 344 MG/DL (ref 65–140)
GLUCOSE SERPL-MCNC: 89 MG/DL (ref 65–140)
HCT VFR BLD AUTO: 24.1 % (ref 34.8–46.1)
HGB BLD-MCNC: 7.5 G/DL (ref 11.5–15.4)
LAMOTRIGINE SERPL-MCNC: 10.7 UG/ML (ref 2–20)
MAGNESIUM SERPL-MCNC: 2.2 MG/DL (ref 1.9–2.7)
MCH RBC QN AUTO: 30.5 PG (ref 26.8–34.3)
MCHC RBC AUTO-ENTMCNC: 31.1 G/DL (ref 31.4–37.4)
MCV RBC AUTO: 98 FL (ref 82–98)
MYCOBACTERIUM SPEC CULT: NORMAL
PLATELET # BLD AUTO: 323 THOUSANDS/UL (ref 149–390)
PMV BLD AUTO: 12 FL (ref 8.9–12.7)
POTASSIUM SERPL-SCNC: 3 MMOL/L (ref 3.5–5.3)
POTASSIUM SERPL-SCNC: 4 MMOL/L (ref 3.5–5.3)
RBC # BLD AUTO: 2.46 MILLION/UL (ref 3.81–5.12)
RHODAMINE-AURAMINE STN SPEC: NORMAL
SODIUM SERPL-SCNC: 152 MMOL/L (ref 135–147)
SODIUM SERPL-SCNC: 152 MMOL/L (ref 135–147)
VANCOMYCIN SERPL-MCNC: 15.7 UG/ML (ref 10–20)
WBC # BLD AUTO: 14.78 THOUSAND/UL (ref 4.31–10.16)

## 2024-03-05 PROCEDURE — 87081 CULTURE SCREEN ONLY: CPT | Performed by: STUDENT IN AN ORGANIZED HEALTH CARE EDUCATION/TRAINING PROGRAM

## 2024-03-05 PROCEDURE — 94664 DEMO&/EVAL PT USE INHALER: CPT

## 2024-03-05 PROCEDURE — 80048 BASIC METABOLIC PNL TOTAL CA: CPT | Performed by: STUDENT IN AN ORGANIZED HEALTH CARE EDUCATION/TRAINING PROGRAM

## 2024-03-05 PROCEDURE — 83735 ASSAY OF MAGNESIUM: CPT | Performed by: STUDENT IN AN ORGANIZED HEALTH CARE EDUCATION/TRAINING PROGRAM

## 2024-03-05 PROCEDURE — 82948 REAGENT STRIP/BLOOD GLUCOSE: CPT

## 2024-03-05 PROCEDURE — 94002 VENT MGMT INPAT INIT DAY: CPT

## 2024-03-05 PROCEDURE — 99233 SBSQ HOSP IP/OBS HIGH 50: CPT | Performed by: INTERNAL MEDICINE

## 2024-03-05 PROCEDURE — 97168 OT RE-EVAL EST PLAN CARE: CPT

## 2024-03-05 PROCEDURE — 80202 ASSAY OF VANCOMYCIN: CPT | Performed by: INTERNAL MEDICINE

## 2024-03-05 PROCEDURE — 94760 N-INVAS EAR/PLS OXIMETRY 1: CPT

## 2024-03-05 PROCEDURE — 94640 AIRWAY INHALATION TREATMENT: CPT

## 2024-03-05 PROCEDURE — 99233 SBSQ HOSP IP/OBS HIGH 50: CPT | Performed by: STUDENT IN AN ORGANIZED HEALTH CARE EDUCATION/TRAINING PROGRAM

## 2024-03-05 PROCEDURE — 97164 PT RE-EVAL EST PLAN CARE: CPT

## 2024-03-05 PROCEDURE — C9113 INJ PANTOPRAZOLE SODIUM, VIA: HCPCS | Performed by: STUDENT IN AN ORGANIZED HEALTH CARE EDUCATION/TRAINING PROGRAM

## 2024-03-05 PROCEDURE — 85027 COMPLETE CBC AUTOMATED: CPT | Performed by: STUDENT IN AN ORGANIZED HEALTH CARE EDUCATION/TRAINING PROGRAM

## 2024-03-05 PROCEDURE — 86140 C-REACTIVE PROTEIN: CPT | Performed by: STUDENT IN AN ORGANIZED HEALTH CARE EDUCATION/TRAINING PROGRAM

## 2024-03-05 RX ORDER — MIDAZOLAM HYDROCHLORIDE 2 MG/2ML
2 INJECTION, SOLUTION INTRAMUSCULAR; INTRAVENOUS ONCE
Status: COMPLETED | OUTPATIENT
Start: 2024-03-05 | End: 2024-03-05

## 2024-03-05 RX ORDER — HYDROMORPHONE HCL/PF 1 MG/ML
0.5 SYRINGE (ML) INJECTION ONCE
Status: COMPLETED | OUTPATIENT
Start: 2024-03-05 | End: 2024-03-05

## 2024-03-05 RX ORDER — VANCOMYCIN HYDROCHLORIDE 1 G/200ML
1000 INJECTION, SOLUTION INTRAVENOUS EVERY 12 HOURS
Status: DISCONTINUED | OUTPATIENT
Start: 2024-03-05 | End: 2024-03-06

## 2024-03-05 RX ORDER — HYDROMORPHONE HCL/PF 1 MG/ML
1 SYRINGE (ML) INJECTION ONCE
Status: DISCONTINUED | OUTPATIENT
Start: 2024-03-05 | End: 2024-03-05

## 2024-03-05 RX ORDER — METHYLPREDNISOLONE SODIUM SUCCINATE 125 MG/2ML
70 INJECTION, POWDER, LYOPHILIZED, FOR SOLUTION INTRAMUSCULAR; INTRAVENOUS EVERY 8 HOURS SCHEDULED
Status: DISCONTINUED | OUTPATIENT
Start: 2024-03-05 | End: 2024-03-07

## 2024-03-05 RX ORDER — POTASSIUM CHLORIDE 20MEQ/15ML
40 LIQUID (ML) ORAL ONCE
Qty: 30 ML | Refills: 0 | Status: COMPLETED | OUTPATIENT
Start: 2024-03-05 | End: 2024-03-05

## 2024-03-05 RX ORDER — PANTOPRAZOLE SODIUM 40 MG/10ML
40 INJECTION, POWDER, LYOPHILIZED, FOR SOLUTION INTRAVENOUS
Status: DISCONTINUED | OUTPATIENT
Start: 2024-03-06 | End: 2024-03-06

## 2024-03-05 RX ORDER — POTASSIUM CHLORIDE 14.9 MG/ML
20 INJECTION INTRAVENOUS ONCE
Qty: 100 ML | Refills: 0 | Status: COMPLETED | OUTPATIENT
Start: 2024-03-05 | End: 2024-03-05

## 2024-03-05 RX ADMIN — POTASSIUM CHLORIDE 40 MEQ: 1.5 SOLUTION ORAL at 11:08

## 2024-03-05 RX ADMIN — NIRMATRELVIR AND RITONAVIR 3 TABLET: KIT at 11:09

## 2024-03-05 RX ADMIN — CHLORHEXIDINE GLUCONATE 0.12% ORAL RINSE 15 ML: 1.2 LIQUID ORAL at 09:04

## 2024-03-05 RX ADMIN — NYSTATIN: 100000 POWDER TOPICAL at 17:34

## 2024-03-05 RX ADMIN — LAMOTRIGINE 150 MG: 100 TABLET ORAL at 17:34

## 2024-03-05 RX ADMIN — TOPIRAMATE 100 MG: 100 TABLET, FILM COATED ORAL at 11:08

## 2024-03-05 RX ADMIN — NIRMATRELVIR AND RITONAVIR 3 TABLET: KIT at 17:36

## 2024-03-05 RX ADMIN — IPRATROPIUM BROMIDE 0.5 MG: 0.5 SOLUTION RESPIRATORY (INHALATION) at 20:01

## 2024-03-05 RX ADMIN — OXYCODONE HYDROCHLORIDE 5 MG: 5 SOLUTION ORAL at 03:28

## 2024-03-05 RX ADMIN — METHYLPREDNISOLONE SODIUM SUCCINATE 70 MG: 125 INJECTION, POWDER, FOR SOLUTION INTRAMUSCULAR; INTRAVENOUS at 21:37

## 2024-03-05 RX ADMIN — LEVALBUTEROL HYDROCHLORIDE 1.25 MG: 1.25 SOLUTION RESPIRATORY (INHALATION) at 07:50

## 2024-03-05 RX ADMIN — CEFEPIME 2000 MG: 2 INJECTION, POWDER, FOR SOLUTION INTRAVENOUS at 09:40

## 2024-03-05 RX ADMIN — REMDESIVIR 100 MG: 100 INJECTION, POWDER, LYOPHILIZED, FOR SOLUTION INTRAVENOUS at 15:06

## 2024-03-05 RX ADMIN — POTASSIUM CHLORIDE 20 MEQ: 14.9 INJECTION, SOLUTION INTRAVENOUS at 06:49

## 2024-03-05 RX ADMIN — Medication 2 SPRAY: at 09:05

## 2024-03-05 RX ADMIN — VANCOMYCIN HYDROCHLORIDE 1000 MG: 1 INJECTION, SOLUTION INTRAVENOUS at 12:50

## 2024-03-05 RX ADMIN — CEFEPIME 2000 MG: 2 INJECTION, POWDER, FOR SOLUTION INTRAVENOUS at 16:20

## 2024-03-05 RX ADMIN — CEFEPIME 2000 MG: 2 INJECTION, POWDER, FOR SOLUTION INTRAVENOUS at 01:24

## 2024-03-05 RX ADMIN — GABAPENTIN 100 MG: 250 SOLUTION ORAL at 21:37

## 2024-03-05 RX ADMIN — TOPIRAMATE 100 MG: 100 TABLET, FILM COATED ORAL at 17:34

## 2024-03-05 RX ADMIN — PANTOPRAZOLE SODIUM 40 MG: 40 INJECTION, POWDER, FOR SOLUTION INTRAVENOUS at 09:04

## 2024-03-05 RX ADMIN — NYSTATIN: 100000 POWDER TOPICAL at 09:05

## 2024-03-05 RX ADMIN — Medication 2 SPRAY: at 01:24

## 2024-03-05 RX ADMIN — IPRATROPIUM BROMIDE 0.5 MG: 0.5 SOLUTION RESPIRATORY (INHALATION) at 13:01

## 2024-03-05 RX ADMIN — METHYLPREDNISOLONE SODIUM SUCCINATE 70 MG: 125 INJECTION, POWDER, FOR SOLUTION INTRAMUSCULAR; INTRAVENOUS at 13:01

## 2024-03-05 RX ADMIN — LAMOTRIGINE 150 MG: 100 TABLET ORAL at 11:08

## 2024-03-05 RX ADMIN — POTASSIUM CHLORIDE 40 MEQ: 1.5 SOLUTION ORAL at 06:48

## 2024-03-05 RX ADMIN — LEVALBUTEROL HYDROCHLORIDE 1.25 MG: 1.25 SOLUTION RESPIRATORY (INHALATION) at 13:01

## 2024-03-05 RX ADMIN — OXYCODONE HYDROCHLORIDE 5 MG: 5 SOLUTION ORAL at 12:54

## 2024-03-05 RX ADMIN — SENNOSIDES 8.6 MG: 8.6 TABLET, FILM COATED ORAL at 17:34

## 2024-03-05 RX ADMIN — SENNOSIDES 8.6 MG: 8.6 TABLET, FILM COATED ORAL at 11:08

## 2024-03-05 RX ADMIN — ENOXAPARIN SODIUM 40 MG: 40 INJECTION SUBCUTANEOUS at 09:04

## 2024-03-05 RX ADMIN — LEVALBUTEROL HYDROCHLORIDE 1.25 MG: 1.25 SOLUTION RESPIRATORY (INHALATION) at 20:01

## 2024-03-05 RX ADMIN — HYDROMORPHONE HYDROCHLORIDE 0.5 MG: 1 INJECTION, SOLUTION INTRAMUSCULAR; INTRAVENOUS; SUBCUTANEOUS at 08:58

## 2024-03-05 RX ADMIN — VENLAFAXINE 25 MG: 25 TABLET ORAL at 11:10

## 2024-03-05 RX ADMIN — Medication 2 SPRAY: at 05:01

## 2024-03-05 RX ADMIN — POLYETHYLENE GLYCOL 3350 17 G: 17 POWDER, FOR SOLUTION ORAL at 11:08

## 2024-03-05 RX ADMIN — METHYLPREDNISOLONE SODIUM SUCCINATE 90 MG: 125 INJECTION, POWDER, FOR SOLUTION INTRAMUSCULAR; INTRAVENOUS at 05:01

## 2024-03-05 RX ADMIN — MIDAZOLAM 2 MG: 1 INJECTION INTRAMUSCULAR; INTRAVENOUS at 09:43

## 2024-03-05 RX ADMIN — IPRATROPIUM BROMIDE 0.5 MG: 0.5 SOLUTION RESPIRATORY (INHALATION) at 07:50

## 2024-03-05 RX ADMIN — CHLORHEXIDINE GLUCONATE 0.12% ORAL RINSE 15 ML: 1.2 LIQUID ORAL at 21:37

## 2024-03-05 NOTE — SOCIAL WORK
The pt has been placed on Bi-pap.  The micu providers will trial her back to hi-migue in a couple hours.  The pt  was informed she would be placed back on bi-pap if she is not successful on hi-migue.  The pt will remain on bi pap through the night if she is not able to remain on hi-migue.    The pt  remains optimistic and supportive.  He states it is frustrating at times but he has seen her get better.  He states he sees everything that her treatment team is doing and is very appreciative.    The Palliative team will continue to follow.

## 2024-03-05 NOTE — PLAN OF CARE
Problem: PHYSICAL THERAPY ADULT  Goal: Performs mobility at highest level of function for planned discharge setting.  See evaluation for individualized goals.  Description:    Equipment Recommended:  (none)       See flowsheet documentation for full assessment, interventions and recommendations.  Outcome: Not Progressing  Note: Prognosis: Fair  Problem List: Decreased strength, Decreased endurance, Impaired balance, Decreased mobility, Decreased safety awareness, Pain, Decreased coordination, Decreased cognition, Impaired judgement  Assessment: Pt is a 59 y.o. female presenting to [facility] on  1/10/2024 1941 w/ primary complaint of  acute respiratory failure w/ hypoxia and COVID-19. Pt has had a complicated hospital stay and has a tendency to become medically unstable. PT initial evaluation was 2/13/2024.  Pt  has a past medical history of hypercalcemia, Lymphoma (HCC), Parathyroid disease (HCC), Seizures (HCC), and Situational depression. See PT initial eval for home set up and PLOF. Upon evaluation today, pt presents with the following impairments:weakness, impaired balance, decreased endurance, impaired coordination, pain, decreased activity tolerance, decreased functional mobility tolerance, decreased safety awareness, fall risk, decreased cognition, and medically unstable (O2 dropping w/ mobility) . Pt's activity limitations include: bed mobility (unable to perform transfers or ambulation at this time). Pt was dependent x 2 for all bed mobility (LE management and trunk support provided). Pt was able to sit EOB w/ max assist x 1. Pt demonstrated protective extension sitting EOB when her trunk began to lean. Pt was unable to keep her eyes open during evaluation, but opened them incosistently when asked. O2 dropped to 65 sitting EOB w/ HFNC, nurse came and changed to biPAP and pt was placed supine. Pt became medically stable after and was left in bed supine. Pt would benefit from skilled PT 2-3x/week  for 8 weeks to address these impairments and activity limitations. Pt will continue to be seen upon admission. Pt's prognosis is Fair secondary to:  PLOF, PMH, medical instability, lack of ability to initiate activity . The patient's AM-PAC Basic Mobility Inpatient Standardized Score is less than 42.9, suggesting this patient may benefit from discharge to post-acute rehabilitation services. Please also refer to the recommendation of the Physical Therapist for safe discharge planning.  Barriers to Discharge: None     Rehab Resource Intensity Level, PT: I (Maximum Resource Intensity)    See flowsheet documentation for full assessment.

## 2024-03-05 NOTE — PLAN OF CARE
Problem: OCCUPATIONAL THERAPY ADULT  Goal: Performs self-care activities at highest level of function for planned discharge setting.  See evaluation for individualized goals.  Description: Treatment Interventions: ADL retraining, Functional transfer training, UE strengthening/ROM, Endurance training, Patient/family training, Equipment evaluation/education, Compensatory technique education, Continued evaluation, Energy conservation, Activityengagement          See flowsheet documentation for full assessment, interventions and recommendations.   Note: Limitation: Decreased ADL status, Decreased UE ROM, Decreased UE strength, Decreased Safe judgement during ADL, Decreased cognition, Decreased endurance, Decreased self-care trans, Decreased high-level ADLs, Non-func R UE, Non-func L UE  Prognosis: Fair, Poor  Assessment: Pt is a 59 y.o. female who was admitted to [facility] on 1/10/2024 with Acute respiratory failure with hypoxia (HCC) - prolonged and complicated hospitalization requiring intubation - now extubated. Patient  has a past medical history of hypercalcemia, Lymphoma (HCC), Parathyroid disease (HCC), Seizures ([facility]), and Situational depression.  At baseline pt was completing adls and mobility independently- shares homemaking with family. Pt lives with spouse and children in 2 [address] home. Currently pt requires total assist for overall ADLS and max to total assist x 2 for functional mobility/transfers. Pt currently presents with impairments in the following categories -steps to enter environment, limited home support, difficulty performing ADLS, difficulty performing IADLS , decreased initiation and engagement , health management , and environment activity tolerance, endurance, standing balance/tolerance, sitting balance/tolerance, UE strength, UE ROM, FMC, GMC, arousal, memory, insight, safety , judgement , attention , sequencing , task initiation , task termination , and communication. These  impairments, as well as pt's fatigue, SOB, SIEGEL, pain, decreased caregiver support, risk for falls, and home environment  limit pt's ability to safely engage in all baseline areas of occupation, includinggrooming, bathing, dressing, toileting, functional mobility/transfers, community mobility, laundry , driving, house maintenance, medication management, meal prep, cleaning, social participation , and leisure activities  From OT standpoint, recommend Level II resources upon D/C. OT will continue to follow to address the below stated goals.     Rehab Resource Intensity Level, OT: II (Moderate Resource Intensity)

## 2024-03-05 NOTE — QUICK NOTE
3/5/2024 9:47 AM -  Carla Torresisak's chart and case were reviewed by Hannah Ramirez PA-C.  Mode of review included electronic chart check.      Per review, primary continues to manage symptoms. No surgical intervention for oozing ostomy at this time. Continues medical optimization.      Palliative care will continue to visit intermittently for support and on request.      For urgent issues or any questions/concerns, please notify on-call provider via [employer].  You may also call our answering service 24/7 at [phone].    Hannah Ramirez PA-C  Palliative and Supportive Care  Clinic/Answering Service: [phone]  You can find me on [employer]!

## 2024-03-05 NOTE — PROGRESS NOTES
Progress Note - Infectious Disease   Carla Hunt 59 y.o. female MRN: [id number]  [facility]: [id number]      Impression/Plan:    1. SIRS  versus sepsis.  Fluctuating fever, WBC.  Fevers may be multifactorial due to COVID (still positive antigen and PCR) versus inflammation (seem to correlate to steroid dosing).  Also consider developing bacterial infection.  CRP is elevated today and steroid dosing was increased.  Recent blood cultures 2/26 negative.  CT C/A/P shows cellulitic and subcutaneous gas around the ostomy site and end ileostomy is dusky.  CT chest also shows ongoing changes of pneumonitis with possible developing bacterial pneumonia in the lateral right middle lobe. Fever curve now improving, CRP dowtrending today after antibiotics started  -Continue steroids, antivirals for COVID as below  -continue IV vancomycin dosing by pharmacy and cefepime 2 g every 8 hours for possible infection on the ostomy versus developing pneumonia  -Check sputum culture if able  -Monitor abdominal exam  -Surgery is following  -Repeat CBC, CRP tomorrow to monitor treatment response  -Monitor fever curve, hemodynamics     2. Recent bacterial pneumonia.  The patient has completed multiple treatment courses over the hospitalization.  Most recent BAL culture with Pseudomonas and stenotrophomonas.  Unclear if pathogens or colonizers.  Status post 10 days levofloxacin.  CT C/A/P showed evolving changes representing combination of evolving pneumonitis secondary to viral COVID infection, improving bacterial infection in the posterior lower lung zones, developing consolidation in the lateral aspect of the right middle lobe concerning for bacterial infection.  Patient currently on high flow nasal cannula   -continue IV cefepime and vancomycin as above   -If worsening respiratory status, switch IV cefepime to IV meropenem   -Check sputum culture   -Wean O2 as able     3. Severe COVID, present on admission.   Patient was treated with a 10-day course of remdesivir, dexamethasone and was given 1 dose of Tocilizumab on admission.  COVID antigen was negative prior to coming off isolation.  Had positive COVID PCR from BAL 2/16, status post another 5-day course of remdesivir.  Patient has remained on high-dose systemic corticosteroid throughout her hospitalization which were recently intensified 2/26 for fevers.  Patient having persistent fevers, hypoxia.  COVID antigen positive and PCR is also +3/3  -Continue remdesivir/Paxlovid per persistent COVID guidelines, treat x 10-14 days based on clinical and laboratory response  -working with lab to report cycle threshold     4. Acute hypoxic respiratory failure, likely multifactorial, with both COVID and bacterial pneumonia contributing.  Also consider persistent inflammation, fibrosis as seems quite steroid responsive.  Most recently extubated 3/1.  On HFNC  -Antivirals as above  -steroids per critical care  -repeat CRP tomorrow     5. 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  Patient is status post exploratory laparotomy with ileocecectomy and end ileostomy creation 2/20.  CT now concerning for cellulitic changes around the ostomy.  End ileostomy is dusky but viable  -Surgery follow-up  -Antibiotics as above     B-cell lymphoma, on maintenance rituxan, which is currently postponed.  Rituximab is a risk factor for persistent COVID infection.    Above management plan to continue antivirals and antibiotics discussed with Dr Palm who is in agreement. Discussed with patient's  at bedside. ID will follow.    Antibiotics:  Day 4 Paxlovid/remdesivir  Day 2 vancomycin/Cefepime    Subjective:  Patient remains on high flow nasal cannula.  She was more agitated this morning per the  this morning and is now lethargic. No abdominal pain noted. Fevers improving.    Objective:  Vitals:  Temp:  [97.6 °F (36.4 °C)-99.7 °F (37.6 °C)] 99.7 °F (37.6 °C)  HR:  []  130  Resp:  [14-31] 25  BP: (125-177)/(60-95) 131/79  SpO2:  [88 %-99 %] 94 %  Temp (24hrs), Av.7 °F (37.1 °C), Min:97.6 °F (36.4 °C), Max:99.7 °F (37.6 °C)  Current: Temperature: 99.7 °F (37.6 °C)    Physical Exam:   General Appearance:  Ill-appearing, lethargic   Throat: Oropharynx moist without lesions.    Lungs:   Rhonchi bilaterally   Heart:  RRR; no murmur, rub or gallop   Abdomen:   Ostomy present with bloody brown stool.  Diffuse abdominal tenderness   Extremities: Tenderness of the left knee joint without effusion or erythema present   Skin: No new rashes or lesions.        Labs:   All pertinent labs and imaging studies were personally reviewed  Results from last 7 days   Lab Units 24  0458 24  1509 24  1044 24  0605   WBC Thousand/uL 14.78*  --  17.10* 16.57*   HEMOGLOBIN g/dL 7.5* 7.3* 7.5* 7.6*   PLATELETS Thousands/uL 323  --  266 251     Results from last 7 days   Lab Units 24  0458 24  1828 24  0605 24  2027 24  0448 24  1832 24  0510   SODIUM mmol/L 152* 155* 159*   < > 150*   < > 148*   POTASSIUM mmol/L 3.0* 3.2* 3.6   < > 3.4*   < > 3.7   CHLORIDE mmol/L 114* 119* 123*   < > 112*   < > 108   CO2 mmol/L 27 28 28   < > 32   < > 29   BUN mg/dL 26* 27* 34*   < > 27*   < > 28*   CREATININE mg/dL 0.49* 0.49* 0.54*   < > 0.53*   < > 0.51*   EGFR ml/min/1.73sq m 107 107 104   < > 104   < > 106   CALCIUM mg/dL 9.5 9.4 10.1   < > 9.8   < > 10.1   AST U/L  --   --  8*  --  11*  --  20   ALT U/L  --   --  17  --  19  --  27   ALK PHOS U/L  --   --  61  --  60  --  66    < > = values in this interval not displayed.         Results from last 7 days   Lab Units 24  0458 24  0605 24  0006 24  0429 24  0430   CRP mg/L 97.3* 215.2* 46.0* 63.1* 133.9*     Results from last 7 days   Lab Units 24  0448   FERRITIN ng/mL 974*     Results from last 7 days   Lab Units 24  0605 24  1157   D-DIMER  QUANTITATIVE ug/ml FEU 1.47* 1.64*         Imaging:  CT chest, abdomen, pelvis personally reviewed and shows evolving changes of the lungs representing combination of pneumonitis, developing focal bacterial type pneumonia in the lateral aspect of the right middle lobe, cellulitic edema and subcutaneous gas in the soft tissue surrounding the ostomy site.

## 2024-03-05 NOTE — PROGRESS NOTES
Vancomycin IV Pharmacy-to-Dose Consultation    Carla Hunt is a 59 y.o. female who is currently receiving Vancomycin IV with management by the Pharmacy Consult service.    Relevant clinical data and objective / subjective history reviewed.      Vancomycin Assessment:  Indication: Pneumonia (goal -600, trough >10)    Status: critically ill  Micro:   - 3/3 COVID: (+)  Procalcitonin: no recent  Renal Function:     Lab Results   Component Value Date    CREATININE 0.49 (L) 03/05/2024     Estimated Creatinine Clearance: 126.2 mL/min (A) (by C-G formula based on SCr of 0.49 mg/dL (L)).  Dialysis: no  Days of Therapy: 2  Current Dose: 1250 mg IV q12h   Goal AUC / Trough: -600, trough >10   Last Level: 15.7 on 3/5  Model Fit:  intermediate  Assessment: ID consulted. Getting repeat level due to intermediate fit. WBC elevated, afebrile.       Vancomycin Plan:  New Dosing: change to 1000 mg IV q12h   Predicted Trough / AUC: 13.5 / 503  Next Level: on 3/6 at 0600  Renal Function Monitoring: Daily BMP and UOP      Pharmacy will continue to follow closely for s/sx of nephrotoxicity, infusion reactions and appropriateness of therapy.  BMP and CBC will be ordered per protocol. We will continue to follow the patient’s culture results and clinical progress daily.       Gabe Henriquez, PharmD, BCCCP  Critical Care Clinical Pharmacist  Available via [employer]

## 2024-03-05 NOTE — RESPIRATORY THERAPY NOTE
03/05/24 1100   Respiratory Assessment   Resp Comments Pt set up on Bipap for low 02 sats. 80'S.   Non-Invasive Information   O2 Interface Device Face mask   Non-Invasive Ventilation Mode BiPAP   $ Continous NIV Initial   SpO2 92 %   $ Pulse Oximetry Spot Check Charge Completed   Non-Invasive Settings   FiO2 (%) 100   IPAP (cm) 16 cm   EPAP (cm) 8 cm   Rate (Set) 8   Inspiratory Time (Set) 1   Humidification 31   Non-Invasive Readings   Total Rate 34   Vt (mL) (Mech) 605   MV (Mech) 15   Peak Pressure (Obs) 21   Leak (lpm) 12   Non-Invasive Alarms   Insp Pressure High (cm H20) 40   Insp Pressure Low (cm H20) 5   MV High (L/min) 20   MV Low (L/min) 2   Vt High (mL) 1200   Vt Low (mL) 100   High Resp Rate (BPM) 45 BPM   Low Resp Rate (BPM) 8 BPM   Apnea Interval (sec) 20   Apnea Rate 8   Maintenance   Water bag changed Yes

## 2024-03-05 NOTE — UTILIZATION REVIEW
"Continued Stay Review    Date: 03/05                          Current Patient Class: IP  Current Level of Care: Level 2 stepdown/HOT    HPI:59 y.o. female initially admitted on 01/10     Assessment/Plan: Pt's HFNC increased to 60% at 45LPM. She had an episode of tachycardia at 130-140. Got a straight cath with bladder scan at 700 mL but output oif around 950mL. She had 3x straight caths and then ultimately had a marie place. No bleeding at ostomy site noted. Na trending down. WBC down to 14.78. K 3.0 today.    Cont Bipap QHS and prn, solumedrol 70 q8, nebs. TF cont free h20 flushes. Continue gentle diuresis. Stable hemoglobin continue DVT prophylaxis. Continue cefepime Vanco check MRSA level continue Paxlovid and remdesivir. Insulin drip. Cont IVFs. Mon BMP.       Vital Signs: BP 96/55   Pulse (!) 114   Temp 99.4 °F (37.4 °C) (Axillary)   Resp (!) 23   Ht 5' 8\" (1.727 m)   Wt 72.1 kg (158 lb 15.2 oz)   SpO2 98%   BMI 24.17 kg/m²       Pertinent Labs/Diagnostic Results:   03/05   CXR:Persistent extensive bilateral groundglass opacity.     CXR 2:Enteric tube placement as above. Bilateral patchy opacities, right greater than left.     Results from last 7 days   Lab Units 03/03/24  1406   SARS-COV-2  Positive*     Results from last 7 days   Lab Units 03/05/24  0458 03/04/24  1509 03/04/24  1044 03/04/24  0605 03/03/24  0448 03/02/24  0443 03/01/24  0538   WBC Thousand/uL 14.78*  --  17.10* 16.57* 14.56*   < > 11.69*   HEMOGLOBIN g/dL 7.5* 7.3* 7.5* 7.6* 7.7*   < > 7.8*   HEMATOCRIT % 24.1* 24.1* 25.0* 25.2* 24.9*   < > 25.5*   PLATELETS Thousands/uL 323  --  266 251 144*   < > 89*   BANDS PCT %  --   --   --  2  --   --  3    < > = values in this interval not displayed.         Results from last 7 days   Lab Units 03/05/24  0458 03/04/24  1828 03/04/24  0605 03/03/24  2027 03/03/24  0448 03/02/24  0510 03/02/24  0510 03/01/24  1614 03/01/24  0538 02/29/24  1753 02/29/24  0429   SODIUM mmol/L 152* 155* 159* 153* " "150*   < >  --    < > 142   < > 151*   POTASSIUM mmol/L 3.0* 3.2* 3.6 2.9* 3.4*   < >  --    < > 4.0   < > 3.2*   CHLORIDE mmol/L 114* 119* 123* 114* 112*   < >  --    < > 105   < > 109*   CO2 mmol/L 27 28 28 28 32   < >  --    < > 33*   < > 34*   ANION GAP mmol/L 11 8 8 11 6   < >  --    < > 4   < > 8   BUN mg/dL 26* 27* 34* 33* 27*   < >  --    < > 34*   < > 34*   CREATININE mg/dL 0.49* 0.49* 0.54* 0.51* 0.53*   < >  --    < > 0.52*   < > 0.64   EGFR ml/min/1.73sq m 107 107 104 106 104   < >  --    < > 105   < > 98   CALCIUM mg/dL 9.5 9.4 10.1 9.8 9.8   < >  --    < > 10.5*   < > 10.8*   MAGNESIUM mg/dL 2.2  --  2.4  --  2.4  --  1.9  --  2.0  --  2.0   PHOSPHORUS mg/dL  --   --  2.2*  --  3.1  --  1.8*  --   --   --  1.8*    < > = values in this interval not displayed.     Results from last 7 days   Lab Units 03/04/24  0605 03/03/24  0448 03/02/24  0510   AST U/L 8* 11* 20   ALT U/L 17 19 27   ALK PHOS U/L 61 60 66   TOTAL PROTEIN g/dL 5.4* 5.3* 5.1*   ALBUMIN g/dL 3.0* 3.0* 3.2*   TOTAL BILIRUBIN mg/dL 0.53 0.81 0.82     Results from last 7 days   Lab Units 03/05/24  1614 03/05/24  1437 03/05/24  1231 03/05/24  0959 03/05/24  0818 03/05/24  0613 03/05/24  0447 03/05/24  0204 03/04/24  2348 03/04/24  2201 03/04/24 2018 03/04/24  1808   POC GLUCOSE mg/dl 173* 136 123 227* 198* 131 89 126 141* 169* 146* 116     Results from last 7 days   Lab Units 03/05/24  0458 03/04/24  1828 03/04/24  0605 03/03/24  2027 03/03/24  0448 03/02/24  1832 03/02/24  0510 03/02/24  0423 03/02/24  0006 03/01/24  1614 03/01/24  0538 02/29/24  1753   GLUCOSE RANDOM mg/dL 103 106 179* 328* 255* 210* 145* 147* 152* 123 120 166*             No results found for: \"BETA-HYDROXYBUTYRATE\"   Results from last 7 days   Lab Units 03/02/24  0438 03/01/24  0720 02/29/24  0826 02/28/24  2158 02/28/24  1818   PH ART  7.481* 7.467* 7.461* 7.449 7.412   PCO2 ART mm Hg 37.9 41.4 44.1* 47.4* 50.0*   PO2 ART mm Hg 77.6 77.5 67.8* 81.5 74.8*   HCO3 ART " mmol/L 27.7 29.3* 30.7* 32.1* 31.1*   BASE EXC ART mmol/L 4.0 5.1 6.3 7.3 5.6   O2 CONTENT ART mL/dL 12.9* 11.1* 7.6* 11.4* 13.7*   O2 HGB, ARTERIAL % 94.1 94.0 91.5* 94.1 93.8*   ABG SOURCE   --   --  Line, Arterial Line, Arterial Line, Arterial                     Results from last 7 days   Lab Units 03/04/24  0605 02/29/24  1157   D-DIMER QUANTITATIVE ug/ml FEU 1.47* 1.64*                 Results from last 7 days   Lab Units 03/03/24  1234   LACTIC ACID mmol/L 1.3                 Results from last 7 days   Lab Units 03/03/24  0448   FERRITIN ng/mL 974*   IRON ug/dL <10*   TIBC ug/dL <173*         Results from last 7 days   Lab Units 03/04/24  1653 03/01/24  0555   UNIT PRODUCT CODE  [id number]   UNIT NUMBER  [id number]   UNITABO  A A   UNITRH  NEG NEG   CROSSMATCH  Compatible Compatible   UNIT DISPENSE STATUS  Crossmatched Presumed Trans   UNIT PRODUCT VOL mL 350 350             Results from last 7 days   Lab Units 03/05/24  0458 03/04/24  0605 03/02/24  0006 02/29/24  0429 02/28/24  0430   CRP mg/L 97.3* 215.2* 46.0* 63.1* 133.9*             Results from last 7 days   Lab Units 03/04/24  1809   CLARITY UA  Clear   COLOR UA  Colorless   SPEC GRAV UA  1.028   PH UA  8.0   GLUCOSE UA mg/dl Negative   KETONES UA mg/dl Negative   BLOOD UA  Negative   PROTEIN UA mg/dl Negative   NITRITE UA  Negative   BILIRUBIN UA  Negative   UROBILINOGEN UA (BE) mg/dl <2.0   LEUKOCYTES UA  Negative   WBC UA /hpf 4-10*   RBC UA /hpf 1-2   BACTERIA UA /hpf None Seen   EPITHELIAL CELLS WET PREP /hpf None Seen     Results from last 7 days   Lab Units 03/03/24  1406   INFLUENZA A PCR  Negative   INFLUENZA B PCR  Negative   RSV PCR  Negative             Results from last 7 days   Lab Units 03/05/24  0458   VANCOMYCIN RM ug/mL 15.7       Medications:   Scheduled Medications:  cefepime, 2,000 mg, Intravenous, Q8H  chlorhexidine, 15 mL, Mouth/Throat, Q12H SARTHAK  enoxaparin, 40 mg, Subcutaneous, Q24H SARTHAK  gabapentin,  100 mg, Oral, HS  ipratropium, 0.5 mg, Nebulization, TID  lamoTRIgine, 150 mg, Oral, BID  levalbuterol, 1.25 mg, Nebulization, TID  methylPREDNISolone sodium succinate, 70 mg, Intravenous, Q8H SARTHAK  nirmatrelvir & ritonavir, 3 tablet, Oral, BID  nystatin, , Topical, BID  [START ON 3/6/2024] pantoprazole, 40 mg, Intravenous, Q24H SARTHAK  polyethylene glycol, 17 g, Per NG Tube, Daily  remdesivir, 100 mg, Intravenous, Q24H  senna, 1 tablet, Per NG Tube, BID  sodium chloride, 2 spray, Each Nare, Q4H  topiramate, 100 mg, Per PEG Tube, BID  vancomycin, 1,000 mg, Intravenous, Q12H  venlafaxine, 25 mg, Per NG Tube, Daily      Continuous IV Infusions:  insulin regular (HumuLIN R,NovoLIN R) 1 Units/mL in sodium chloride 0.9 % 100 mL infusion, 0.3-21 Units/hr, Intravenous, Titrated      PRN Meds:  acetaminophen, 650 mg, Oral, Q6H PRN  hydrALAZINE, 10 mg, Intravenous, Q6H PRN  OLANZapine, 10 mg, Oral, HS PRN  ondansetron, 4 mg, Intravenous, Q6H PRN  oxyCODONE, 2.5 mg, Oral, Q4H PRN   Or  oxyCODONE, 5 mg, Oral, Q4H PRN  sodium chloride, 1 Application, Nasal, Q1H PRN        Discharge Plan: [facility]    Network Utilization Review Department  ATTENTION: Please call with any questions or concerns to [phone] and carefully listen to the prompts so that you are directed to the right person. All voicemails are confidential.   For Discharge needs, contact Care Management DC Support Team at [phone] opt. 2  Send all requests for admission clinical reviews, approved or denied determinations and any other requests to dedicated fax number below belonging to the campus where the patient is receiving treatment. List of dedicated fax numbers for the Facilities:  FACILITY NAME UR FAX NUMBER   ADMISSION DENIALS (Administrative/Medical Necessity) [phone]   DISCHARGE SUPPORT TEAM (NETWORK) [phone]   PARENT CHILD HEALTH (Maternity/NICU/Pediatrics) [phone]   [facility] [phone]   [facility] [phone]   [facility] [phone]   [facility] [phone]   [facility] [phone]   [facility] [phone]   [facility] [phone]   [facility] [phone]   [facility] [phone]   [facility] [phone]   [facility] [phone]   [facility] [phone]

## 2024-03-05 NOTE — PHYSICAL THERAPY NOTE
PT Re-Evaluation       03/05/24 1925   PT Last Visit   PT Visit Date 03/05/24   Note Type   Note type Re-Evaluation   Education   Education Provided Yes   End of Consult   Patient Position at End of Consult Supine   Pain Assessment   Pain Assessment Tool FLACC   Pain Rating: FLACC (Rest) - Face 1   Pain Rating: FLACC (Rest) - Legs 0   Pain Rating: FLACC (Rest) - Activity 0   Pain Rating: FLACC (Rest) - Cry 0   Pain Rating: FLACC (Rest) - Consolability 1   Score: FLACC (Rest) 2   Pain Rating: FLACC (Activity) - Face 1   Pain Rating: FLACC (Activity) - Legs 0   Pain Rating: FLACC (Activity) - Activity 1   Pain Rating: FLACC (Activity) - Cry 1   Pain Rating: FLACC (Activity) - Consolability 1   Score: FLACC (Activity) 4   Restrictions/Precautions   Weight Bearing Precautions Per Order No   Braces or Orthoses   (none)   Other Precautions Contact/isolation;Airborne/isolation;Cognitive;Bed Alarm;Chair Alarm;Aspiration;Multiple lines;Telemetry;O2;Fall Risk;Pain   Home Living   Additional Comments see inital PT eval   Prior Function   Comments see initial PT eval   General   Family/Caregiver Present Yes  ()   Cognition   Overall Cognitive Status (S)  Impaired   Arousal/Participation Sedated   Attention Difficulty attending to directions   Orientation Level Unable to assess   Memory Decreased recall of recent events;Decreased short term memory;Decreased recall of precautions   Following Commands Follows one step commands inconsistently   Comments required frequent stimulation, opened eyes inconsistently when asked and was unable to keep them open during evaluation   Subjective   Subjective Pt was found supine in bed.  tried to get his wife's attention multiple times   RUE Assessment   RUE Assessment   (see OT eval)   LUE Assessment   LUE Assessment   (see OT eval)   RLE Assessment   RLE Assessment X   Strength RLE   RLE Overall Strength 0/5   LLE Assessment   LLE Assessment X   Strength LLE   LLE Overall  Strength 0/5   Coordination   Movements are Fluid and Coordinated 0   Coordination and Movement Description limited due to fatigue and weakness   Bed Mobility   Rolling R 1  Dependent   Additional items Assist x 1   Supine to Sit 1  Dependent   Additional items Assist x 2;LE management   Sit to Supine 1  Dependent   Additional items Assist x 2;LE management   Additional Comments LE management and trunk support provided in order to perform task. Pt was able to sit EOB w/ max assist x 2, tendency to move arms slightly for protective extension   Transfers   Sit to Stand Unable to assess   Ambulation/Elevation   Gait pattern Not appropriate   Balance   Static Sitting Poor   Dynamic Sitting Poor -   Endurance Deficit   Endurance Deficit Yes   Endurance Deficit Description limited due to fatigue and weakness   Activity Tolerance   Activity Tolerance Patient limited by fatigue;Other (Comment);Treatment limited secondary to medical complications (Comment)  (weakness and O2 dropped sitting EOB (switched from HFNC to BiPAP in order to become stable))   Medical Staff Made Aware OT   Nurse Made Aware yes   Assessment   Prognosis Fair   Problem List Decreased strength;Decreased endurance;Impaired balance;Decreased mobility;Decreased safety awareness;Pain;Decreased coordination;Decreased cognition;Impaired judgement   Assessment Pt is a 59 y.o. female presenting to [facility] on  1/10/2024 1941 w/ primary complaint of  acute respiratory failure w/ hypoxia and COVID-19. Pt has had a complicated hospital stay and has a tendency to become medically unstable. PT initial evaluation was 2/13/2024.  Pt  has a past medical history of hypercalcemia, Lymphoma (HCC), Parathyroid disease (HCC), Seizures (HCC), and Situational depression. See PT initial eval for home set up and PLOF. Upon evaluation today, pt presents with the following impairments:weakness, impaired balance, decreased endurance, impaired coordination, pain,  decreased activity tolerance, decreased functional mobility tolerance, decreased safety awareness, fall risk, decreased cognition, and medically unstable (O2 dropping w/ mobility) . Pt's activity limitations include: bed mobility (unable to perform transfers or ambulation at this time). Pt was dependent x 2 for all bed mobility (LE management and trunk support provided). Pt was able to sit EOB w/ max assist x 1. Pt demonstrated protective extension sitting EOB when her trunk began to lean. Pt was unable to keep her eyes open during evaluation, but opened them incosistently when asked. O2 dropped to 65 sitting EOB w/ HFNC, nurse came and changed to biPAP and pt was placed supine. Pt became medically stable after and was left in bed supine. Pt would benefit from skilled PT 2-3x/week for 8 weeks to address these impairments and activity limitations. Pt will continue to be seen upon admission. Pt's prognosis is Fair secondary to:  PLOF, PMH, medical instability, lack of ability to initiate activity . The patient's AM-PAC Basic Mobility Inpatient Standardized Score is less than 42.9, suggesting this patient may benefit from discharge to post-acute rehabilitation services. Please also refer to the recommendation of the Physical Therapist for safe discharge planning.   Goals   Patient Goals none stated at this time   [facility] Expiration Date 03/19/24   Long Term Goal #1 In 14 days, pt will: 1) Perform bed mobility w/ min assist x 1 to participate in frequent repositioning and improve skin integrity 2) PT to see for transfers as appropriate 3) Improve b/l LE strength by 1/2 grade in order to improve efficiency of tranfers 4) Improve sitting and standing balance by 1 grade to improve safety and independence   Plan   Treatment/Interventions LE strengthening/ROM;Therapeutic exercise;Endurance training;Functional transfer training;ADL retraining;Cognitive reorientation;Equipment eval/education;Bed mobility;Spoke to nursing;OT   PT  Frequency 2-3x/wk   Discharge Recommendation   Rehab Resource Intensity Level, PT I (Maximum Resource Intensity)   AM-PAC Basic Mobility Inpatient   Turning in Flat Bed Without Bedrails 1   Lying on Back to Sitting on Edge of Flat Bed Without Bedrails 1   Moving Bed to Chair 1   Standing Up From Chair Using Arms 1   Walk in Room 1   Climb 3-5 Stairs With Railing 1   Basic Mobility Inpatient Raw Score 6   Turning Head Towards Sound 2   Follow Simple Instructions 2   Low Function Basic Mobility Raw Score  10   Low Function Basic Mobility Standardized Score  14.65   Highest Level Of Mobility   JH-HLM Goal 2: Bed activities/Dependent transfer   JH-HLM Achieved 3: Sit at edge of bed     Karlene Mata, SPT

## 2024-03-05 NOTE — OCCUPATIONAL THERAPY NOTE
Occupational Therapy Evaluation     Patient Name: Carla Hunt  Today's Date: 3/5/2024  Problem List  Principal Problem:    Acute respiratory failure with hypoxia (HCC)  Active Problems:    Anxiety state    COVID    Stage 3b chronic kidney disease (CKD) (HCC)    Teto marginal zone B-cell lymphoma (HCC)    Seizure disorder (HCC)    Hypotension    Palliative care patient    Goals of care, counseling/discussion    Acute kidney injury (HCC)    Cecal volvulus (HCC)    Past Medical History  Past Medical History:   Diagnosis Date    Hx of hypercalcemia     Lymphoma (HCC) 2021    Parathyroid disease (HCC)     Seizures (HCC)     Situational depression     24 yr old son  from drug overdose     Past Surgical History  Past Surgical History:   Procedure Laterality Date    CRANIOTOMY FOR TEMPORAL LOBECTOMY Left     ND LAPS ABD PRTM&OMENTUM DX W/WO SPEC BR/WA SPX N/A 2024    Procedure: LAPAROSCOPY DIAGNOSTIC,EXPLORATORY LAPAROTOMY, RIGHT KARY COLECTOMY,ILEOSTOMY, MUCUS FISTULA;  Surgeon: Lauryn Ullrich, DO;  Location: [facility];  Service: General         24 1454   OT Last Visit   OT Visit Date 24   Note Type   Note type Re-Evaluation   Pain Assessment   Pain Assessment Tool FLACC   Pain Rating: FLACC (Rest) - Face 0   Pain Rating: FLACC (Rest) - Legs 0   Pain Rating: FLACC (Rest) - Activity 0   Pain Rating: FLACC (Rest) - Cry 0   Pain Rating: FLACC (Rest) - Consolability 0   Score: FLACC (Rest) 0   Pain Rating: FLACC (Activity) - Face 1   Pain Rating: FLACC (Activity) - Legs 0   Pain Rating: FLACC (Activity) - Activity 0   Pain Rating: FLACC (Activity) - Cry 1   Pain Rating: FLACC (Activity) - Consolability 1   Score: FLACC (Activity) 3   Restrictions/Precautions   Weight Bearing Precautions Per Order No   Other Precautions Contact/isolation;Airborne/isolation;Cognitive;Chair Alarm;Bed Alarm;Multiple lines;O2;Fall Risk;Pain   Home Living   Type of Home House   Home Layout Two level;1/2 bath  on main level;Bed/bath upstairs;Stairs to enter with rails   Bathroom Shower/Tub Walk-in shower   Bathroom Toilet Standard   Bathroom Equipment Hand-held shower   Prior Function   Level of [place] Independent with ADLs;Independent with functional mobility;Independent with IADLS   Lives With Spouse;Family;Son;Daughter   Receives Help From Family   IADLs Independent with driving;Independent with meal prep;Independent with medication management   Falls in the last 6 months 0   Vocational Volunteer work   Lifestyle   Autonomy I adls and mobility - i iadls - shares homemaking with family   Reciprocal Relationships supportive family   Service to Others volunteers   Intrinsic Gratification mostly sedentary   Subjective   Subjective nonverbal t/o   ADL   Eating Assistance Unable to assess   Eating Deficit NPO   Grooming Assistance 1  Total Assistance   UB Bathing Assistance 1  Total Assistance   LB Bathing Assistance 1  Total Assistance   UB Dressing Assistance 1  Total Assistance   LB Dressing Assistance 1  Total Assistance   Toileting Assistance  1  Total Assistance   Bed Mobility   Rolling R 1  Dependent   Supine to Sit 1  Dependent   Additional items Assist x 2   Sit to Supine 1  Dependent   Additional items Assist x 2   Additional Comments   (required support t/o to maintain upright - tolerated x 2-3 min before desaturating into 60's on HFNC - returned to supine RN, MD and RT present and placed pt back on bipap)   Transfers   Sit to Stand Unable to assess   Balance   Static Sitting Poor   Dynamic Sitting Poor   Static Standing Zero   Activity Tolerance   Activity Tolerance Patient limited by fatigue;Treatment limited secondary to medical complications (Comment)   Medical Staff Made Aware PT present for co-eval 2* medical complexity, comorbidities and limited overall tolerance to activities   RUE Assessment   RUE Assessment X  (minimal active movement distally to command - PROM WFL)   LUE Assessment   LUE  Assessment X  (minimal active movement distally to command - PROM WFL)   Cognition   Overall Cognitive Status Impaired   Arousal/Participation Lethargic;Sedated;Persistent stimuli required;Poorly responsive   Attention Difficulty attending to directions   Orientation Level Oriented to person   Memory Unable to assess   Following Commands Follows one step commands inconsistently   Assessment   Limitation Decreased ADL status;Decreased UE ROM;Decreased UE strength;Decreased Safe judgement during ADL;Decreased cognition;Decreased endurance;Decreased self-care trans;Decreased high-level ADLs;Non-func R UE;Non-func L UE   Prognosis Fair;Poor   Assessment Pt is a 59 y.o. female who was admitted to [facility] on 1/10/2024 with Acute respiratory failure with hypoxia (HCC) - prolonged and complicated hospitalization requiring intubation - now extubated. Patient  has a past medical history of hypercalcemia, Lymphoma (HCC), Parathyroid disease (HCC), Seizures (HCC), and Situational depression.  At baseline pt was completing adls and mobility independently- shares homemaking with family. Pt lives with spouse and children in 2 story home. Currently pt requires total assist for overall ADLS and max to total assist x 2 for functional mobility/transfers. Pt currently presents with impairments in the following categories -steps to enter environment, limited home support, difficulty performing ADLS, difficulty performing IADLS , decreased initiation and engagement , health management , and environment activity tolerance, endurance, standing balance/tolerance, sitting balance/tolerance, UE strength, UE ROM, FMC, GMC, arousal, memory, insight, safety , judgement , attention , sequencing , task initiation , task termination , and communication. These impairments, as well as pt's fatigue, SOB, SIEGEL, pain, decreased caregiver support, risk for falls, and home environment  limit pt's ability to safely engage in all baseline areas  of occupation, includinggrooming, bathing, dressing, toileting, functional mobility/transfers, community mobility, laundry , driving, house maintenance, medication management, meal prep, cleaning, social participation , and leisure activities  From OT standpoint, recommend Level II resources upon D/C. OT will continue to follow to address the below stated goals.   Goals   Patient Goals none stated   LTG Time Frame 10-14   Long Term Goal #1 refer to established goals below   Plan   Treatment Interventions ADL retraining;Functional transfer training;UE strengthening/ROM;Endurance training;Cognitive reorientation;Patient/family training;Equipment evaluation/education;Neuromuscular reeducation;Fine motor coordination activities;Compensatory technique education;Energy conservation;Activityengagement   Goal Expiration Date 03/19/24   OT Treatment Day 7   OT Frequency 2-3x/wk   Discharge Recommendation   Rehab Resource Intensity Level, OT II (Moderate Resource Intensity)   AM-PAC Daily Activity Inpatient   Lower Body Dressing 1   Bathing 1   Toileting 1   Upper Body Dressing 1   Grooming 1   Eating 1   Daily Activity Raw Score 6   Turning Head Towards Sound 2   Follow Simple Instructions 2   Low Function Daily Activity Raw Score 10   Low Function Daily Activity Standardized Score  17.31   AM-PAC Applied Cognition Inpatient   Following a Speech/Presentation 2   Understanding Ordinary Conversation 3   Taking Medications 2   Remembering Where Things Are Placed or Put Away 2   Remembering List of 4-5 Errands 1   Taking Care of Complicated Tasks 1   Applied Cognition Raw Score 11   Applied Cognition Standardized Score 27.03   Barthel Index   Feeding 0   Bathing 0   Grooming Score 0   Dressing Score 0   Bladder Score 0   Bowels Score 0   Toilet Use Score 0   Transfers (Bed/Chair) Score 0   Mobility (Level Surface) Score 0   Stairs Score 0   Barthel Index Score 0   Modified Kendall Scale   Modified Kendall Scale 4   End of Consult    Education Provided Yes   Patient Position at End of Consult Supine;All needs within reach   Nurse Communication Nurse aware of consult       OCCUPATIONAL THERAPY GOALS:    *Increase arousal to 15-20 min/session  *Follow 1 step commands with 75% consistency  *Mod a light grooming after setup with min a/cues to initiate task  *Mod a x 2 bed mobility with min a to maintain sitting on EOB to engage in light self care tasks -OTR to assess OOB transfers as appropriate  *Increase BUE AROM to WFL with at least 3+/5 strength for functional use with adls  *Increase activity tolerance to 25-30min/session with need for 1-2 rest breaks   *Assess DME needs  *Ongoing functional cognitive assessment to assist with safe d/c recommendations        The patient's raw score on the AM-PAC Daily Activity Inpatient Short Form is 6. A raw score of less than 19 suggests the patient may benefit from discharge to post-acute rehabilitation services. Please refer to the recommendation of the Occupational Therapist for safe discharge planning.      Shoshana Garcia

## 2024-03-06 LAB
ANION GAP SERPL CALCULATED.3IONS-SCNC: 10 MMOL/L
ARTERIAL PATENCY WRIST A: YES
BASE EXCESS BLDA CALC-SCNC: 1.3 MMOL/L
BUN SERPL-MCNC: 31 MG/DL (ref 5–25)
CA-I BLD-SCNC: 1.29 MMOL/L (ref 1.12–1.32)
CALCIUM SERPL-MCNC: 9.2 MG/DL (ref 8.4–10.2)
CHLORIDE SERPL-SCNC: 111 MMOL/L (ref 96–108)
CO2 SERPL-SCNC: 26 MMOL/L (ref 21–32)
CREAT SERPL-MCNC: 0.63 MG/DL (ref 0.6–1.3)
CRP SERPL QL: 172.3 MG/L
ERYTHROCYTE [DISTWIDTH] IN BLOOD BY AUTOMATED COUNT: 20.5 % (ref 11.6–15.1)
GFR SERPL CREATININE-BSD FRML MDRD: 99 ML/MIN/1.73SQ M
GLUCOSE SERPL-MCNC: 126 MG/DL (ref 65–140)
GLUCOSE SERPL-MCNC: 143 MG/DL (ref 65–140)
GLUCOSE SERPL-MCNC: 165 MG/DL (ref 65–140)
GLUCOSE SERPL-MCNC: 196 MG/DL (ref 65–140)
GLUCOSE SERPL-MCNC: 197 MG/DL (ref 65–140)
GLUCOSE SERPL-MCNC: 206 MG/DL (ref 65–140)
GLUCOSE SERPL-MCNC: 206 MG/DL (ref 65–140)
GLUCOSE SERPL-MCNC: 223 MG/DL (ref 65–140)
GLUCOSE SERPL-MCNC: 226 MG/DL (ref 65–140)
GLUCOSE SERPL-MCNC: 259 MG/DL (ref 65–140)
GLUCOSE SERPL-MCNC: 281 MG/DL (ref 65–140)
GLUCOSE SERPL-MCNC: 298 MG/DL (ref 65–140)
HCO3 BLDA-SCNC: 24.5 MMOL/L (ref 22–28)
HCT VFR BLD AUTO: 21.5 % (ref 34.8–46.1)
HCT VFR BLD AUTO: 28.3 % (ref 34.8–46.1)
HFNC FLOW LPM: 40
HGB BLD-MCNC: 6.5 G/DL (ref 11.5–15.4)
HGB BLD-MCNC: 8.8 G/DL (ref 11.5–15.4)
MAGNESIUM SERPL-MCNC: 2.1 MG/DL (ref 1.9–2.7)
MCH RBC QN AUTO: 30.7 PG (ref 26.8–34.3)
MCHC RBC AUTO-ENTMCNC: 30.2 G/DL (ref 31.4–37.4)
MCV RBC AUTO: 101 FL (ref 82–98)
MRSA NOSE QL CULT: NORMAL
NON VENT HFNC FIO2: 50
NON VENT TYPE HFNC: ABNORMAL
O2 CT BLDA-SCNC: 11.7 ML/DL (ref 16–23)
OXYHGB MFR BLDA: 86.3 % (ref 94–97)
PCO2 BLDA: 33.4 MM HG (ref 36–44)
PH BLDA: 7.48 [PH] (ref 7.35–7.45)
PLATELET # BLD AUTO: 238 THOUSANDS/UL (ref 149–390)
PMV BLD AUTO: 12.1 FL (ref 8.9–12.7)
PO2 BLDA: 46.4 MM HG (ref 75–129)
POTASSIUM SERPL-SCNC: 3.3 MMOL/L (ref 3.5–5.3)
RBC # BLD AUTO: 2.12 MILLION/UL (ref 3.81–5.12)
SODIUM SERPL-SCNC: 147 MMOL/L (ref 135–147)
SPECIMEN SOURCE: ABNORMAL
VANCOMYCIN SERPL-MCNC: 29.1 UG/ML (ref 10–20)
WBC # BLD AUTO: 7.51 THOUSAND/UL (ref 4.31–10.16)

## 2024-03-06 PROCEDURE — 80202 ASSAY OF VANCOMYCIN: CPT | Performed by: INTERNAL MEDICINE

## 2024-03-06 PROCEDURE — 93005 ELECTROCARDIOGRAM TRACING: CPT

## 2024-03-06 PROCEDURE — 80048 BASIC METABOLIC PNL TOTAL CA: CPT | Performed by: STUDENT IN AN ORGANIZED HEALTH CARE EDUCATION/TRAINING PROGRAM

## 2024-03-06 PROCEDURE — 94640 AIRWAY INHALATION TREATMENT: CPT

## 2024-03-06 PROCEDURE — 82330 ASSAY OF CALCIUM: CPT | Performed by: STUDENT IN AN ORGANIZED HEALTH CARE EDUCATION/TRAINING PROGRAM

## 2024-03-06 PROCEDURE — 85018 HEMOGLOBIN: CPT | Performed by: STUDENT IN AN ORGANIZED HEALTH CARE EDUCATION/TRAINING PROGRAM

## 2024-03-06 PROCEDURE — 82948 REAGENT STRIP/BLOOD GLUCOSE: CPT

## 2024-03-06 PROCEDURE — 83735 ASSAY OF MAGNESIUM: CPT | Performed by: STUDENT IN AN ORGANIZED HEALTH CARE EDUCATION/TRAINING PROGRAM

## 2024-03-06 PROCEDURE — 86140 C-REACTIVE PROTEIN: CPT | Performed by: STUDENT IN AN ORGANIZED HEALTH CARE EDUCATION/TRAINING PROGRAM

## 2024-03-06 PROCEDURE — C9113 INJ PANTOPRAZOLE SODIUM, VIA: HCPCS | Performed by: STUDENT IN AN ORGANIZED HEALTH CARE EDUCATION/TRAINING PROGRAM

## 2024-03-06 PROCEDURE — 82805 BLOOD GASES W/O2 SATURATION: CPT

## 2024-03-06 PROCEDURE — 36600 WITHDRAWAL OF ARTERIAL BLOOD: CPT

## 2024-03-06 PROCEDURE — 99233 SBSQ HOSP IP/OBS HIGH 50: CPT | Performed by: STUDENT IN AN ORGANIZED HEALTH CARE EDUCATION/TRAINING PROGRAM

## 2024-03-06 PROCEDURE — 85014 HEMATOCRIT: CPT | Performed by: STUDENT IN AN ORGANIZED HEALTH CARE EDUCATION/TRAINING PROGRAM

## 2024-03-06 PROCEDURE — 94760 N-INVAS EAR/PLS OXIMETRY 1: CPT

## 2024-03-06 PROCEDURE — 99291 CRITICAL CARE FIRST HOUR: CPT | Performed by: INTERNAL MEDICINE

## 2024-03-06 PROCEDURE — 94664 DEMO&/EVAL PT USE INHALER: CPT

## 2024-03-06 PROCEDURE — 85027 COMPLETE CBC AUTOMATED: CPT | Performed by: STUDENT IN AN ORGANIZED HEALTH CARE EDUCATION/TRAINING PROGRAM

## 2024-03-06 PROCEDURE — P9040 RBC LEUKOREDUCED IRRADIATED: HCPCS

## 2024-03-06 RX ORDER — HYDROMORPHONE HCL IN WATER/PF 6 MG/30 ML
0.2 PATIENT CONTROLLED ANALGESIA SYRINGE INTRAVENOUS ONCE
Status: COMPLETED | OUTPATIENT
Start: 2024-03-06 | End: 2024-03-06

## 2024-03-06 RX ORDER — GABAPENTIN 250 MG/5ML
100 SOLUTION ORAL ONCE
Status: COMPLETED | OUTPATIENT
Start: 2024-03-07 | End: 2024-03-07

## 2024-03-06 RX ORDER — METOCLOPRAMIDE HYDROCHLORIDE 5 MG/ML
5 INJECTION INTRAMUSCULAR; INTRAVENOUS EVERY 6 HOURS PRN
Status: DISCONTINUED | OUTPATIENT
Start: 2024-03-06 | End: 2024-03-07

## 2024-03-06 RX ORDER — FAMOTIDINE 20 MG/1
20 TABLET, FILM COATED ORAL 2 TIMES DAILY
Status: DISCONTINUED | OUTPATIENT
Start: 2024-03-06 | End: 2024-03-14

## 2024-03-06 RX ORDER — METOCLOPRAMIDE HYDROCHLORIDE 5 MG/ML
5 INJECTION INTRAMUSCULAR; INTRAVENOUS EVERY 8 HOURS SCHEDULED
Status: DISCONTINUED | OUTPATIENT
Start: 2024-03-06 | End: 2024-03-06

## 2024-03-06 RX ORDER — POTASSIUM CHLORIDE 29.8 MG/ML
40 INJECTION INTRAVENOUS ONCE
Status: COMPLETED | OUTPATIENT
Start: 2024-03-06 | End: 2024-03-06

## 2024-03-06 RX ORDER — FUROSEMIDE 10 MG/ML
40 INJECTION INTRAMUSCULAR; INTRAVENOUS ONCE
Status: COMPLETED | OUTPATIENT
Start: 2024-03-06 | End: 2024-03-06

## 2024-03-06 RX ORDER — POTASSIUM CHLORIDE 20MEQ/15ML
40 LIQUID (ML) ORAL ONCE
Status: COMPLETED | OUTPATIENT
Start: 2024-03-06 | End: 2024-03-06

## 2024-03-06 RX ADMIN — PANTOPRAZOLE SODIUM 40 MG: 40 INJECTION, POWDER, FOR SOLUTION INTRAVENOUS at 08:42

## 2024-03-06 RX ADMIN — METOCLOPRAMIDE 5 MG: 5 INJECTION, SOLUTION INTRAMUSCULAR; INTRAVENOUS at 13:10

## 2024-03-06 RX ADMIN — IPRATROPIUM BROMIDE 0.5 MG: 0.5 SOLUTION RESPIRATORY (INHALATION) at 14:12

## 2024-03-06 RX ADMIN — FAMOTIDINE 20 MG: 20 TABLET, FILM COATED ORAL at 12:13

## 2024-03-06 RX ADMIN — POTASSIUM CHLORIDE 40 MEQ: 29.8 INJECTION, SOLUTION INTRAVENOUS at 06:32

## 2024-03-06 RX ADMIN — FAMOTIDINE 20 MG: 20 TABLET, FILM COATED ORAL at 17:32

## 2024-03-06 RX ADMIN — LEVALBUTEROL HYDROCHLORIDE 1.25 MG: 1.25 SOLUTION RESPIRATORY (INHALATION) at 07:34

## 2024-03-06 RX ADMIN — CHLORHEXIDINE GLUCONATE 0.12% ORAL RINSE 15 ML: 1.2 LIQUID ORAL at 08:42

## 2024-03-06 RX ADMIN — Medication 2 SPRAY: at 06:31

## 2024-03-06 RX ADMIN — NYSTATIN: 100000 POWDER TOPICAL at 18:01

## 2024-03-06 RX ADMIN — POLYETHYLENE GLYCOL 3350 17 G: 17 POWDER, FOR SOLUTION ORAL at 08:43

## 2024-03-06 RX ADMIN — OXYCODONE HYDROCHLORIDE 5 MG: 5 SOLUTION ORAL at 14:29

## 2024-03-06 RX ADMIN — LEVALBUTEROL HYDROCHLORIDE 1.25 MG: 1.25 SOLUTION RESPIRATORY (INHALATION) at 20:14

## 2024-03-06 RX ADMIN — Medication 2 SPRAY: at 00:03

## 2024-03-06 RX ADMIN — METHYLPREDNISOLONE SODIUM SUCCINATE 70 MG: 125 INJECTION, POWDER, FOR SOLUTION INTRAMUSCULAR; INTRAVENOUS at 13:13

## 2024-03-06 RX ADMIN — SODIUM CHLORIDE 6 UNITS/HR: 9 INJECTION, SOLUTION INTRAVENOUS at 17:58

## 2024-03-06 RX ADMIN — OXYCODONE HYDROCHLORIDE 5 MG: 5 SOLUTION ORAL at 07:37

## 2024-03-06 RX ADMIN — SENNOSIDES 8.6 MG: 8.6 TABLET, FILM COATED ORAL at 08:42

## 2024-03-06 RX ADMIN — NIRMATRELVIR AND RITONAVIR 3 TABLET: KIT at 18:00

## 2024-03-06 RX ADMIN — VANCOMYCIN HYDROCHLORIDE 1500 MG: 1 INJECTION, POWDER, LYOPHILIZED, FOR SOLUTION INTRAVENOUS at 17:46

## 2024-03-06 RX ADMIN — FUROSEMIDE 40 MG: 10 INJECTION, SOLUTION INTRAMUSCULAR; INTRAVENOUS at 10:41

## 2024-03-06 RX ADMIN — Medication 2 SPRAY: at 16:12

## 2024-03-06 RX ADMIN — POTASSIUM CHLORIDE 40 MEQ: 1.5 SOLUTION ORAL at 06:31

## 2024-03-06 RX ADMIN — CHLORHEXIDINE GLUCONATE 0.12% ORAL RINSE 15 ML: 1.2 LIQUID ORAL at 20:42

## 2024-03-06 RX ADMIN — LEVALBUTEROL HYDROCHLORIDE 1.25 MG: 1.25 SOLUTION RESPIRATORY (INHALATION) at 14:12

## 2024-03-06 RX ADMIN — NIRMATRELVIR AND RITONAVIR 3 TABLET: KIT at 08:46

## 2024-03-06 RX ADMIN — HYDROMORPHONE HYDROCHLORIDE 0.2 MG: 0.2 INJECTION, SOLUTION INTRAMUSCULAR; INTRAVENOUS; SUBCUTANEOUS at 10:50

## 2024-03-06 RX ADMIN — METHYLPREDNISOLONE SODIUM SUCCINATE 70 MG: 125 INJECTION, POWDER, FOR SOLUTION INTRAMUSCULAR; INTRAVENOUS at 06:33

## 2024-03-06 RX ADMIN — REMDESIVIR 100 MG: 100 INJECTION, POWDER, LYOPHILIZED, FOR SOLUTION INTRAVENOUS at 15:00

## 2024-03-06 RX ADMIN — TOPIRAMATE 100 MG: 100 TABLET, FILM COATED ORAL at 17:36

## 2024-03-06 RX ADMIN — VENLAFAXINE 25 MG: 25 TABLET ORAL at 09:16

## 2024-03-06 RX ADMIN — CEFEPIME 2000 MG: 2 INJECTION, POWDER, FOR SOLUTION INTRAVENOUS at 01:19

## 2024-03-06 RX ADMIN — SENNOSIDES 8.6 MG: 8.6 TABLET, FILM COATED ORAL at 17:32

## 2024-03-06 RX ADMIN — Medication 2 SPRAY: at 12:07

## 2024-03-06 RX ADMIN — Medication 2 SPRAY: at 18:01

## 2024-03-06 RX ADMIN — METHYLPREDNISOLONE SODIUM SUCCINATE 70 MG: 125 INJECTION, POWDER, FOR SOLUTION INTRAMUSCULAR; INTRAVENOUS at 21:16

## 2024-03-06 RX ADMIN — Medication 2 SPRAY: at 22:25

## 2024-03-06 RX ADMIN — IPRATROPIUM BROMIDE 0.5 MG: 0.5 SOLUTION RESPIRATORY (INHALATION) at 07:35

## 2024-03-06 RX ADMIN — SODIUM CHLORIDE 8 UNITS/HR: 9 INJECTION, SOLUTION INTRAVENOUS at 04:20

## 2024-03-06 RX ADMIN — CEFEPIME 2000 MG: 2 INJECTION, POWDER, FOR SOLUTION INTRAVENOUS at 08:41

## 2024-03-06 RX ADMIN — ENOXAPARIN SODIUM 40 MG: 40 INJECTION SUBCUTANEOUS at 08:42

## 2024-03-06 RX ADMIN — TOPIRAMATE 100 MG: 100 TABLET, FILM COATED ORAL at 09:16

## 2024-03-06 RX ADMIN — NYSTATIN: 100000 POWDER TOPICAL at 09:16

## 2024-03-06 RX ADMIN — IPRATROPIUM BROMIDE 0.5 MG: 0.5 SOLUTION RESPIRATORY (INHALATION) at 20:14

## 2024-03-06 RX ADMIN — VANCOMYCIN HYDROCHLORIDE 1000 MG: 1 INJECTION, SOLUTION INTRAVENOUS at 00:03

## 2024-03-06 RX ADMIN — CEFEPIME 2000 MG: 2 INJECTION, POWDER, FOR SOLUTION INTRAVENOUS at 16:07

## 2024-03-06 RX ADMIN — LAMOTRIGINE 150 MG: 100 TABLET ORAL at 08:41

## 2024-03-06 RX ADMIN — LAMOTRIGINE 150 MG: 100 TABLET ORAL at 17:31

## 2024-03-06 RX ADMIN — SODIUM CHLORIDE 9 UNITS/HR: 9 INJECTION, SOLUTION INTRAVENOUS at 22:05

## 2024-03-06 NOTE — PLAN OF CARE
Problem: Prexisting or High Potential for Compromised Skin Integrity  Goal: Skin integrity is maintained or improved  Description: INTERVENTIONS:  - Identify patients at risk for skin breakdown  - Assess and monitor skin integrity  - Assess and monitor nutrition and hydration status  - Monitor labs   - Assess for incontinence   - Turn and reposition patient  - Assist with mobility/ambulation  - Relieve pressure over bony prominences  - Avoid friction and shearing  - Provide appropriate hygiene as needed including keeping skin clean and dry  - Evaluate need for skin moisturizer/barrier cream  - Collaborate with interdisciplinary team   - Patient/family teaching  - Consider wound care consult   Outcome: Progressing     Problem: Nutrition/Hydration-ADULT  Goal: Nutrient/Hydration intake appropriate for improving, restoring or maintaining nutritional needs  Description: Monitor and assess patient's nutrition/hydration status for malnutrition. Collaborate with interdisciplinary team and initiate plan and interventions as ordered.  Monitor patient's weight and dietary intake as ordered or per policy. Utilize nutrition screening tool and intervene as necessary. Determine patient's food preferences and provide high-protein, high-caloric foods as appropriate.     INTERVENTIONS:  - Monitor oral intake, urinary output, labs, and treatment plans  - Assess nutrition and hydration status and recommend course of action  - Evaluate amount of meals eaten  - Assist patient with eating if necessary   - Allow adequate time for meals  - Recommend/ encourage appropriate diets, oral nutritional supplements, and vitamin/mineral supplements  - Order, calculate, and assess calorie counts as needed  - Recommend, monitor, and adjust tube feedings and TPN/PPN based on assessed needs  - Assess need for intravenous fluids  - Provide specific nutrition/hydration education as appropriate  - Include patient/family/caregiver in decisions related to  nutrition  Outcome: Progressing     Problem: SAFETY,RESTRAINT: NV/NON-SELF DESTRUCTIVE BEHAVIOR  Goal: Remains free of harm/injury (restraint for non violent/non self-detsructive behavior)  Description: INTERVENTIONS:  - Instruct patient/family regarding restraint use   - Assess and monitor physiologic and psychological status   - Provide interventions and comfort measures to meet assessed patient needs   - Identify and implement measures to help patient regain control  - Assess readiness for release of restraint   Outcome: Progressing  Goal: Returns to optimal restraint-free functioning  Description: INTERVENTIONS:  - Assess the patient's behavior and symptoms that indicate continued need for restraint  - Identify and implement measures to help patient regain control  - Assess readiness for release of restraint   Outcome: Progressing     Problem: INFECTION - ADULT  Goal: Absence or prevention of progression during hospitalization  Description: INTERVENTIONS:  - Assess and monitor for signs and symptoms of infection  - Monitor lab/diagnostic results  - Monitor all insertion sites, i.e. indwelling lines, tubes, and drains  - Monitor endotracheal if appropriate and nasal secretions for changes in amount and color  - [place] appropriate cooling/warming therapies per order  - Administer medications as ordered  - Instruct and encourage patient and family to use good hand hygiene technique  - Identify and instruct in appropriate isolation precautions for identified infection/condition  Outcome: Progressing     Problem: SAFETY ADULT  Goal: Patient will remain free of falls  Description: INTERVENTIONS:  - Educate patient/family on patient safety including physical limitations  - Instruct patient to call for assistance with activity   - Consult OT/PT to assist with strengthening/mobility   - Keep Call bell within reach  - Keep bed low and locked with side rails adjusted as appropriate  - Keep care items and personal  belongings within reach  - Initiate and maintain comfort rounds  - Make Fall Risk Sign visible to staff  - Offer Toileting every 2 Hours, in advance of need  - Initiate/Maintain bed alarm  - Obtain necessary fall risk management equipment: non skid footwear  - Apply yellow socks and bracelet for high fall risk patients  - Consider moving patient to room near nurses station  Outcome: Progressing     Problem: RESPIRATORY - ADULT  Goal: Achieves optimal ventilation and oxygenation  Description: INTERVENTIONS:  - Assess for changes in respiratory status  - Assess for changes in mentation and behavior  - Position to facilitate oxygenation and minimize respiratory effort  - Oxygen administered by appropriate delivery if ordered  - Initiate smoking cessation education as indicated  - Encourage broncho-pulmonary hygiene including cough, deep breathe, Incentive Spirometry  - Assess the need for suctioning and aspirate as needed  - Assess and instruct to report SOB or any respiratory difficulty  - Respiratory Therapy support as indicated  Outcome: Progressing     Problem: SKIN/TISSUE INTEGRITY - ADULT  Goal: Skin Integrity remains intact(Skin Breakdown Prevention)  Description: Assess:  -Perform Flavio assessment every shift   -Clean and moisturize skin every day   -Inspect skin when repositioning, toileting, and assisting with ADLS  -Assess under medical devices such as ronny  every hour   -Assess extremities for adequate circulation and sensation     Bed Management:  -Have minimal linens on bed & keep smooth, unwrinkled  -Change linens as needed when moist or perspiring  -Avoid sitting or lying in one position for more than 2 hours while in bed  -Keep HOB at 45 degrees     Toileting:  -Offer bedside commode  -Assess for incontinence every hour  -Use incontinent care products after each incontinent episode such as moisture barrier cream     Activity:  -Mobilize patient 3 times a day  -Encourage activity and walks on  unit  -Encourage or provide ROM exercises   -Turn and reposition patient every 2 Hours  -Use appropriate equipment to lift or move patient in bed  -Instruct/ Assist with weight shifting every hour when out of bed in chair  -Consider limitation of chair time 2 hour intervals    Skin Care:  -Avoid use of baby powder, tape, friction and shearing, hot water or constrictive clothing  -Relieve pressure over bony prominences using allevyn   -Do not massage red bony areas    Next Steps:  -Teach patient strategies to minimize risks such as weight shifting    -Consider consults to  interdisciplinary teams such as PT  Outcome: Progressing  Goal: Incision(s), wounds(s) or drain site(s) healing without S/S of infection  Description: INTERVENTIONS  - Assess and document dressing, incision, wound bed, drain sites and surrounding tissue  - Provide patient and family education  Outcome: Progressing  Goal: Pressure injury heals and does not worsen  Description: Interventions:  - Implement low air loss mattress or specialty surface (Criteria met)  - Apply silicone foam dressing  - Apply fecal or urinary incontinence containment device   - Perform passive or active ROM every 4 hours  - Turn and reposition patient & offload bony prominences every 2 hours   - Utilize friction reducing device or surface for transfers   - Consider nutrition services referral as needed  Outcome: Progressing     Problem: NEUROSENSORY - ADULT  Goal: Achieves stable or improved neurological status  Description: INTERVENTIONS  - Monitor and report changes in neurological status  - Monitor vital signs such as temperature, blood pressure, glucose, and any other labs ordered   - Initiate measures to prevent increased intracranial pressure  - Monitor for seizure activity and implement precautions if appropriate      Outcome: Progressing  Goal: Achieves maximal functionality and self care  Description: INTERVENTIONS  - Monitor swallowing and airway patency with  patient fatigue and changes in neurological status  - Encourage and assist patient to increase activity and self care.   - Encourage visually impaired, hearing impaired and aphasic patients to use assistive/communication devices  Outcome: Progressing     Problem: CARDIOVASCULAR - ADULT  Goal: Maintains optimal cardiac output and hemodynamic stability  Description: INTERVENTIONS:  - Monitor I/O, vital signs and rhythm  - Monitor for S/S and trends of decreased cardiac output  - Administer and titrate ordered vasoactive medications to optimize hemodynamic stability  - Assess quality of pulses, skin color and temperature  - Assess for signs of decreased coronary artery perfusion  - Instruct patient to report change in severity of symptoms  Outcome: Progressing  Goal: Absence of cardiac dysrhythmias or at baseline rhythm  Description: INTERVENTIONS:  - Continuous cardiac monitoring, vital signs, obtain 12 lead EKG if ordered  - Administer antiarrhythmic and heart rate control medications as ordered  - Monitor electrolytes and administer replacement therapy as ordered  Outcome: Progressing     Problem: GASTROINTESTINAL - ADULT  Goal: Minimal or absence of nausea and/or vomiting  Description: INTERVENTIONS:  - Administer IV fluids if ordered to ensure adequate hydration  - Maintain NPO status until nausea and vomiting are resolved  - Nasogastric tube if ordered  - Administer ordered antiemetic medications as needed  - Provide nonpharmacologic comfort measures as appropriate  - Advance diet as tolerated, if ordered  - Consider nutrition services referral to assist patient with adequate nutrition and appropriate food choices  Outcome: Progressing  Goal: Maintains or returns to baseline bowel function  Description: INTERVENTIONS:  - Assess bowel function  - Encourage oral fluids to ensure adequate hydration  - Administer IV fluids if ordered to ensure adequate hydration  - Administer ordered medications as needed  -  Encourage mobilization and activity  - Consider nutritional services referral to assist patient with adequate nutrition and appropriate food choices  Outcome: Progressing  Goal: Maintains adequate nutritional intake  Description: INTERVENTIONS:  - Monitor percentage of each meal consumed  - Identify factors contributing to decreased intake, treat as appropriate  - Assist with meals as needed  - Monitor I&O, weight, and lab values if indicated  - Obtain nutrition services referral as needed  Outcome: Progressing  Goal: Establish and maintain optimal ostomy function  Description: INTERVENTIONS:  - Assess bowel function  - Encourage oral fluids to ensure adequate hydration  - Administer IV fluids if ordered to ensure adequate hydration   - Administer ordered medications as needed  - Encourage mobilization and activity  - Nutrition services referral to assist patient with appropriate food choices  - Assess stoma site  - Consider wound care consult   Outcome: Progressing  Goal: Oral mucous membranes remain intact  Description: INTERVENTIONS  - Assess oral mucosa and hygiene practices  - Implement preventative oral hygiene regimen  - Implement oral medicated treatments as ordered  - Initiate Nutrition services referral as needed  Outcome: Progressing     Problem: METABOLIC, FLUID AND ELECTROLYTES - ADULT  Goal: Electrolytes maintained within normal limits  Description: INTERVENTIONS:  - Monitor labs and assess patient for signs and symptoms of electrolyte imbalances  - Administer electrolyte replacement as ordered  - Monitor response to electrolyte replacements, including repeat lab results as appropriate  - Instruct patient on fluid and nutrition as appropriate  Outcome: Progressing  Goal: Fluid balance maintained  Description: INTERVENTIONS:  - Monitor labs   - Monitor I/O and WT  - Instruct patient on fluid and nutrition as appropriate  - Assess for signs & symptoms of volume excess or deficit  Outcome:  Progressing  Goal: Glucose maintained within target range  Description: INTERVENTIONS:  - Monitor Blood Glucose as ordered  - Assess for signs and symptoms of hyperglycemia and hypoglycemia  - Administer ordered medications to maintain glucose within target range  - Assess nutritional intake and initiate nutrition service referral as needed  Outcome: Progressing     Problem: HEMATOLOGIC - ADULT  Goal: Maintains hematologic stability  Description: INTERVENTIONS  - Assess for signs and symptoms of bleeding or hemorrhage  - Monitor labs  - Administer supportive blood products/factors as ordered and appropriate  Outcome: Progressing     Problem: MUSCULOSKELETAL - ADULT  Goal: Maintain or return mobility to safest level of function  Description: INTERVENTIONS:  - Assess patient's ability to carry out ADLs; assess patient's baseline for ADL function and identify physical deficits which impact ability to perform ADLs (bathing, care of mouth/teeth, toileting, grooming, dressing, etc.)  - Assess/evaluate cause of self-care deficits   - Assess range of motion  - Assess patient's mobility  - Assess patient's need for assistive devices and provide as appropriate  - Encourage maximum independence but intervene and supervise when necessary  - Involve family in performance of ADLs  - Assess for home care needs following discharge   - Consider OT consult to assist with ADL evaluation and planning for discharge  - Provide patient education as appropriate  Outcome: Progressing     Problem: COPING  Goal: Pt/Family able to verbalize concerns and demonstrate effective coping strategies  Description: INTERVENTIONS:  - Assist patient/family to identify coping skills, available support systems and cultural and spiritual values  - Provide emotional support, including active listening and acknowledgement of concerns of patient and caregivers  - Reduce environmental stimuli, as able  - Provide patient education  - Assess for spiritual  pain/suffering and initiate spiritual care, including notification of Pastoral Care or [place] based community as needed  - Assess effectiveness of coping strategies  Outcome: Progressing  Goal: Will report anxiety at manageable levels  Description: INTERVENTIONS:  - Administer medication as ordered  - Teach and encourage coping skills  - Provide emotional support  - Assess patient/family for anxiety and ability to cope  Outcome: Progressing     Problem: BEHAVIOR  Goal: Pt/Family maintain appropriate behavior and adhere to behavioral management agreement, if implemented  Description: INTERVENTIONS:  - Assess the family dynamic   - Encourage verbalization of thoughts and concerns in a socially appropriate manner  - Assess patient/family's coping skills and non-compliant behavior (including use of illegal substances).  - Utilize positive, consistent limit setting strategies supporting safety of patient, staff and others  - Initiate consult with Case Management, Spiritual Care or other ancillary services as appropriate  - If a patient's/visitor's behavior jeopardizes the safety of the patient, staff, or others, refer to organization procedure.   - Notify Security of behavior or suspected illegal substances which indicate the need for search of the patient and/or belongings  - Encourage participation in the decision making process about a behavioral management agreement; implement if patient meets criteria  Outcome: Progressing     Problem: Potential for Falls  Goal: Patient will remain free of falls  Description: INTERVENTIONS:  - Educate patient/family on patient safety including physical limitations  - Instruct patient to call for assistance with activity   - Consult OT/PT to assist with strengthening/mobility   - Keep Call bell within reach  - Keep bed low and locked with side rails adjusted as appropriate  - Keep care items and personal belongings within reach  - Initiate and maintain comfort rounds  - Make Fall Risk  Sign visible to staff  - Offer Toileting every 2 Hours, in advance of need  - Initiate/Maintain bed alarm  - Obtain necessary fall risk management equipment: non skid footwear  - Apply yellow socks and bracelet for high fall risk patients  - Consider moving patient to room near nurses station  Outcome: Progressing

## 2024-03-06 NOTE — PLAN OF CARE
Problem: SAFETY ADULT  Goal: Patient will remain free of falls  Description: INTERVENTIONS:  - Educate patient/family on patient safety including physical limitations  - Instruct patient to call for assistance with activity   - Consult OT/PT to assist with strengthening/mobility   - Keep Call bell within reach  - Keep bed low and locked with side rails adjusted as appropriate  - Keep care items and personal belongings within reach  - Initiate and maintain comfort rounds  - Make Fall Risk Sign visible to staff  - Offer Toileting every 2 Hours, in advance of need  - Initiate/Maintain bed alarm  - Obtain necessary fall risk management equipment: non skid footwear  - Apply yellow socks and bracelet for high fall risk patients  - Consider moving patient to room near nurses station  Outcome: Progressing     Problem: RESPIRATORY - ADULT  Goal: Achieves optimal ventilation and oxygenation  Description: INTERVENTIONS:  - Assess for changes in respiratory status  - Assess for changes in mentation and behavior  - Position to facilitate oxygenation and minimize respiratory effort  - Oxygen administered by appropriate delivery if ordered  - Initiate smoking cessation education as indicated  - Encourage broncho-pulmonary hygiene including cough, deep breathe, Incentive Spirometry  - Assess the need for suctioning and aspirate as needed  - Assess and instruct to report SOB or any respiratory difficulty  - Respiratory Therapy support as indicated  Outcome: Progressing     Problem: SKIN/TISSUE INTEGRITY - ADULT  Goal: Skin Integrity remains intact(Skin Breakdown Prevention)  Description: Assess:  -Perform Flavio assessment every shift   -Clean and moisturize skin every day   -Inspect skin when repositioning, toileting, and assisting with ADLS  -Assess under medical devices such as ronny  every hour   -Assess extremities for adequate circulation and sensation     Bed Management:  -Have minimal linens on bed & keep smooth,  unwrinkled  -Change linens as needed when moist or perspiring  -Avoid sitting or lying in one position for more than 2 hours while in bed  -Keep HOB at 45 degrees     Toileting:  -Offer bedside commode  -Assess for incontinence every hour  -Use incontinent care products after each incontinent episode such as moisture barrier cream     Activity:  -Mobilize patient 3 times a day  -Encourage activity and walks on unit  -Encourage or provide ROM exercises   -Turn and reposition patient every 2 Hours  -Use appropriate equipment to lift or move patient in bed  -Instruct/ Assist with weight shifting every hour when out of bed in chair  -Consider limitation of chair time 2 hour intervals    Skin Care:  -Avoid use of baby powder, tape, friction and shearing, hot water or constrictive clothing  -Relieve pressure over bony prominences using allevyn   -Do not massage red bony areas    Next Steps:  -Teach patient strategies to minimize risks such as weight shifting    -Consider consults to  interdisciplinary teams such as PT  Outcome: Progressing     Problem: Potential for Falls  Goal: Patient will remain free of falls  Description: INTERVENTIONS:  - Educate patient/family on patient safety including physical limitations  - Instruct patient to call for assistance with activity   - Consult OT/PT to assist with strengthening/mobility   - Keep Call bell within reach  - Keep bed low and locked with side rails adjusted as appropriate  - Keep care items and personal belongings within reach  - Initiate and maintain comfort rounds  - Make Fall Risk Sign visible to staff  - Offer Toileting every 2 Hours, in advance of need  - Initiate/Maintain bed alarm  - Obtain necessary fall risk management equipment: non skid footwear  - Apply yellow socks and bracelet for high fall risk patients  - Consider moving patient to room near nurses station  Outcome: Progressing     Problem: Nutrition/Hydration-ADULT  Goal: Nutrient/Hydration intake  appropriate for improving, restoring or maintaining nutritional needs  Description: Monitor and assess patient's nutrition/hydration status for malnutrition. Collaborate with interdisciplinary team and initiate plan and interventions as ordered.  Monitor patient's weight and dietary intake as ordered or per policy. Utilize nutrition screening tool and intervene as necessary. Determine patient's food preferences and provide high-protein, high-caloric foods as appropriate.     INTERVENTIONS:  - Monitor oral intake, urinary output, labs, and treatment plans  - Assess nutrition and hydration status and recommend course of action  - Evaluate amount of meals eaten  - Assist patient with eating if necessary   - Allow adequate time for meals  - Recommend/ encourage appropriate diets, oral nutritional supplements, and vitamin/mineral supplements  - Order, calculate, and assess calorie counts as needed  - Recommend, monitor, and adjust tube feedings and TPN/PPN based on assessed needs  - Assess need for intravenous fluids  - Provide specific nutrition/hydration education as appropriate  - Include patient/family/caregiver in decisions related to nutrition  Outcome: Progressing     Problem: SAFETY,RESTRAINT: NV/NON-SELF DESTRUCTIVE BEHAVIOR  Goal: Remains free of harm/injury (restraint for non violent/non self-detsructive behavior)  Description: INTERVENTIONS:  - Instruct patient/family regarding restraint use   - Assess and monitor physiologic and psychological status   - Provide interventions and comfort measures to meet assessed patient needs   - Identify and implement measures to help patient regain control  - Assess readiness for release of restraint   Outcome: Progressing  Goal: Returns to optimal restraint-free functioning  Description: INTERVENTIONS:  - Assess the patient's behavior and symptoms that indicate continued need for restraint  - Identify and implement measures to help patient regain control  - Assess  readiness for release of restraint   Outcome: Progressing

## 2024-03-06 NOTE — PHYSICAL THERAPY NOTE
Physical Therapy Cancellation Note         03/06/24 1220   PT Last Visit   PT Visit Date 03/06/24   Note Type   Note Type Cancelled Session   Cancel Reasons Medical status     CHART REVIEW COMPLETED. PT NOT APPROPRIATE FOR PARTICIPATION IN PT AT THIS TIME 2* MEDICAL STATUS/ RECEIVING BLOOD. WILL CONTINUE TO FOLLOW ON CASELOAD AND PROGRESS AS MEDICAL STATUS DICTATES.   Moon Ventura, PT

## 2024-03-06 NOTE — UTILIZATION REVIEW
Continued Stay Review    Date: 3/6/2024                          Current Patient Class: inpatient    Current Level of Care: SD2 ICU    HPI:59 y.o. female initially admitted on 1/10/2024     Assessment/Plan:   Off of sedation monitor for withdrawal in setting of HX of SZs. On Lamictal 150mg BID/ Topamax 100 BID. On Venlafaxine 25 mg QD via NGT & Gabapentine QHS last dose today; maintaining BP wow IV Pressor GTT, weaning supplemental need now on 40 L HFNC off BiPAP, cont IV Solumedrol Q 12HR consider taper to 60mg Q*HR, obtain CRP/ D Dimer Q 48HR; trrend CRP. 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 Stoma appears sl retracted; Surgery following; cont TF on PPI QD given significant steroid regimen; Monitor I/O, UO in setting of CKD III. Replete & follow electrolytes. RX for B cell lymphoma OP course for 2 YR on hold; last dose 12/20/2023. Currently WBC normal. Cont IV Insulin GTT w BLD Sugar goal 140-160, BG range last 24hrs 126-259. DXd Severe covid pneumonia and stenotrophomonas pneumonia. Currently off of antibiotics, on remdesevir and paxlovid for Antigen positive test for covid  -RSV/Flu/Covid panel positive, we are using that to assess the amount of covid viral particle shedding quantitatively as we continue to treat her with remdesevir and paxlovid  -On Vancomycin and cefepime to cover treatment for stoma-wall cellulitis and possible pneumonia, day 3 today. May have to consider adding anaerobic coverage  -On remdesecir and paxlovid for 14 day course    Vital Signs:   Date/Time Temp Pulse Resp BP MAP (mmHg) Arterial Line BP MAP SpO2 FiO2 (%) Calculated FIO2 (%) - Nasal Cannula O2 Flow Rate (L/min) Nasal Cannula O2 Flow Rate (L/min) O2 Device O2 Interface Device Patient Position - Orthostatic VS   03/06/24 1030 -- 115 Abnormal  26 Abnormal  162/87 -- -- -- 93 % -- -- -- -- -- -- --   03/06/24 1000 -- 112 Abnormal  24 Abnormal  143/75 103 -- -- 93 % -- -- -- -- -- -- --    03/06/24 0900 -- 110 Abnormal  22 135/73 98 -- -- 97 % 50 -- 40 L/min -- -- -- --   03/06/24 0811 99.6 °F (37.6 °C) 96 25 Abnormal  130/65 -- -- -- 96 % -- -- -- -- -- -- --   03/06/24 0800 99.4 °F (37.4 °C) 95 24 Abnormal  136/67 -- -- -- 94 % -- -- -- -- -- -- --   03/06/24 0751 99.4 °F (37.4 °C) -- -- 142/67 -- -- -- -- -- -- -- -- -- -- --   03/06/24 0735 99.4 °F (37.4 °C) 93 24 Abnormal  142/67 -- -- -- 98 % 60 -- 40 L/min -- High flow nasal cannula HFNC prongs --   03/06/24 0700 -- 133 Abnormal  25 Abnormal  147/85 109 -- -- 99 % -- -- -- -- -- -- --   03/06/24 0600 -- 139 Abnormal  26 Abnormal  143/74 101 -- -- 99 % -- -- -- -- -- -- --   03/06/24 0500 -- 128 Abnormal  23 Abnormal  141/81 106 -- -- 99 % -- -- -- -- -- -- --   03/06/24 0400 99.4 °F (37.4 °C) 120 Abnormal  24 Abnormal  141/79 104 -- -- 99 % -- -- -- -- BiPAP -- Lying   03/06/24 0300 -- 119 Abnormal  19 146/87 112 -- -- 99 % -- -- -- -- -- -- --   03/06/24 0254 -- -- -- -- -- -- -- -- -- -- -- -- -- Face mask --   03/06/24 0200 -- 104 23 Abnormal  130/78 99 -- -- 99 % -- -- -- -- -- -- --   03/06/24 0000 99.5 °F (37.5 °C) 128 Abnormal  26 Abnormal  143/86 109 -- -- 100 % -- -- -- -- BiPAP -- Lying       Pertinent Labs/Diagnostic Results:   Results from last 7 days   Lab Units 03/03/24  1406   SARS-COV-2  Positive*     Results from last 7 days   Lab Units 03/06/24  0441 03/05/24  0458 03/04/24  1509 03/04/24  1044 03/04/24  0605 03/02/24  0443 03/01/24  0538   WBC Thousand/uL 7.51 14.78*  --  17.10* 16.57*   < > 11.69*   HEMOGLOBIN g/dL 6.5* 7.5* 7.3* 7.5* 7.6*   < > 7.8*   HEMATOCRIT % 21.5* 24.1* 24.1* 25.0* 25.2*   < > 25.5*   PLATELETS Thousands/uL 238 323  --  266 251   < > 89*   BANDS PCT %  --   --   --   --  2  --  3    < > = values in this interval not displayed.         Results from last 7 days   Lab Units 03/06/24  0441 03/05/24  1619 03/05/24  0458 03/04/24  1828 03/04/24  0605 03/03/24  2027 03/03/24  0448 03/02/24  0510  "03/02/24  0510 02/29/24  1753 02/29/24  0429   SODIUM mmol/L 147 152* 152* 155* 159*   < > 150*   < >  --    < > 151*   POTASSIUM mmol/L 3.3* 4.0 3.0* 3.2* 3.6   < > 3.4*   < >  --    < > 3.2*   CHLORIDE mmol/L 111* 121* 114* 119* 123*   < > 112*   < >  --    < > 109*   CO2 mmol/L 26 24 27 28 28   < > 32   < >  --    < > 34*   ANION GAP mmol/L 10 7 11 8 8   < > 6   < >  --    < > 8   BUN mg/dL 31* 28* 26* 27* 34*   < > 27*   < >  --    < > 34*   CREATININE mg/dL 0.63 0.63 0.49* 0.49* 0.54*   < > 0.53*   < >  --    < > 0.64   EGFR ml/min/1.73sq m 99 99 107 107 104   < > 104   < >  --    < > 98   CALCIUM mg/dL 9.2 9.3 9.5 9.4 10.1   < > 9.8   < >  --    < > 10.8*   MAGNESIUM mg/dL 2.1  --  2.2  --  2.4  --  2.4  --  1.9   < > 2.0   PHOSPHORUS mg/dL  --   --   --   --  2.2*  --  3.1  --  1.8*  --  1.8*    < > = values in this interval not displayed.     Results from last 7 days   Lab Units 03/04/24  0605 03/03/24  0448 03/02/24  0510   AST U/L 8* 11* 20   ALT U/L 17 19 27   ALK PHOS U/L 61 60 66   TOTAL PROTEIN g/dL 5.4* 5.3* 5.1*   ALBUMIN g/dL 3.0* 3.0* 3.2*   TOTAL BILIRUBIN mg/dL 0.53 0.81 0.82     Results from last 7 days   Lab Units 03/06/24  0624 03/06/24  0411 03/06/24  0203 03/05/24  2353 03/05/24  2202 03/05/24  2055 03/05/24  1757 03/05/24  1614 03/05/24  1437 03/05/24  1231 03/05/24  0959 03/05/24  0818   POC GLUCOSE mg/dl 126 226* 259* 139 166* 158* 190* 173* 136 123 227* 198*     Results from last 7 days   Lab Units 03/06/24  0441 03/05/24  1619 03/05/24  0458 03/04/24  1828 03/04/24  0605 03/03/24  2027 03/03/24  0448 03/02/24  1832 03/02/24  0510 03/02/24  0423 03/02/24  0006 03/01/24  1614   GLUCOSE RANDOM mg/dL 206* 187* 103 106 179* 328* 255* 210* 145* 147* 152* 123             No results found for: \"BETA-HYDROXYBUTYRATE\"   Results from last 7 days   Lab Units 03/02/24  0438 03/01/24  0720 02/29/24  0826 02/28/24  2158 02/28/24  1818   PH ART  7.481* 7.467* 7.461* 7.449 7.412   PCO2 ART mm Hg 37.9 " 41.4 44.1* 47.4* 50.0*   PO2 ART mm Hg 77.6 77.5 67.8* 81.5 74.8*   HCO3 ART mmol/L 27.7 29.3* 30.7* 32.1* 31.1*   BASE EXC ART mmol/L 4.0 5.1 6.3 7.3 5.6   O2 CONTENT ART mL/dL 12.9* 11.1* 7.6* 11.4* 13.7*   O2 HGB, ARTERIAL % 94.1 94.0 91.5* 94.1 93.8*   ABG SOURCE   --   --  Line, Arterial Line, Arterial Line, Arterial                     Results from last 7 days   Lab Units 03/04/24  0605 02/29/24  1157   D-DIMER QUANTITATIVE ug/ml FEU 1.47* 1.64*                 Results from last 7 days   Lab Units 03/03/24  1234   LACTIC ACID mmol/L 1.3                 Results from last 7 days   Lab Units 03/03/24  0448   FERRITIN ng/mL 974*   IRON ug/dL <10*   TIBC ug/dL <173*         Results from last 7 days   Lab Units 03/06/24  0736 03/06/24  0733 03/01/24  0555   UNIT PRODUCT CODE  [id number]  [id number]   UNIT NUMBER  [id number]  [id number]   UNITABO  A A  A A   UNITRH  NEG NEG  NEG NEG   CROSSMATCH  Compatible Compatible  Compatible Compatible   UNIT DISPENSE STATUS  Issued Crossmatched  Crossmatched Presumed Trans   UNIT PRODUCT VOL mL 350 350  350 350             Results from last 7 days   Lab Units 03/06/24  0441 03/05/24  0458 03/04/24  0605 03/02/24  0006 02/29/24  0429   CRP mg/L 172.3* 97.3* 215.2* 46.0* 63.1*             Results from last 7 days   Lab Units 03/04/24  1809   CLARITY UA  Clear   COLOR UA  Colorless   SPEC GRAV UA  1.028   PH UA  8.0   GLUCOSE UA mg/dl Negative   KETONES UA mg/dl Negative   BLOOD UA  Negative   PROTEIN UA mg/dl Negative   NITRITE UA  Negative   BILIRUBIN UA  Negative   UROBILINOGEN UA (BE) mg/dl <2.0   LEUKOCYTES UA  Negative   WBC UA /hpf 4-10*   RBC UA /hpf 1-2   BACTERIA UA /hpf None Seen   EPITHELIAL CELLS WET PREP /hpf None Seen     Results from last 7 days   Lab Units 03/03/24  1406   INFLUENZA A PCR  Negative   INFLUENZA B PCR  Negative   RSV PCR  Negative                                         Results from last  7 days   Lab Units 03/06/24  0441 03/05/24  0458   VANCOMYCIN RM ug/mL 29.1* 15.7       Medications:   Scheduled Medications:  cefepime, 2,000 mg, Intravenous, Q8H  chlorhexidine, 15 mL, Mouth/Throat, Q12H SARTHAK  enoxaparin, 40 mg, Subcutaneous, Q24H SARTHAK  famotidine, 20 mg, Per NG Tube, BID  gabapentin, 100 mg, Oral, HS  HYDROmorphone, 0.2 mg, Intravenous, Once  ipratropium, 0.5 mg, Nebulization, TID  lamoTRIgine, 150 mg, Oral, BID  levalbuterol, 1.25 mg, Nebulization, TID  methylPREDNISolone sodium succinate, 70 mg, Intravenous, Q8H SARTHAK  nirmatrelvir 300 mg & ritonavir 100 mg, 3 tablet, Oral, BID  nystatin, , Topical, BID  polyethylene glycol, 17 g, Per NG Tube, Daily  remdesivir, 100 mg, Intravenous, Q24H  senna, 1 tablet, Per NG Tube, BID  silver nitrate-potassium nitrate, 1 applicator, Topical, Once  silver nitrate-potassium nitrate, 1 applicator, Topical, Once  silver nitrate-potassium nitrate, 1 applicator, Topical, Once  sodium chloride, 2 spray, Each Nare, Q4H  topiramate, 100 mg, Per PEG Tube, BID  vancomycin, 1,500 mg, Intravenous, Q24H  venlafaxine, 25 mg, Per NG Tube, Daily      Continuous IV Infusions:  insulin regular (HumuLIN R,NovoLIN R) 1 Units/mL in sodium chloride 0.9 % 100 mL infusion, 0.3-21 Units/hr, Intravenous, Titrated      PRN Meds:  acetaminophen, 650 mg, Oral, Q6H PRN  hydrALAZINE, 10 mg, Intravenous, Q6H PRN  OLANZapine, 10 mg, Oral, HS PRN  ondansetron, 4 mg, Intravenous, Q6H PRN  oxyCODONE, 2.5 mg, Oral, Q4H PRN   Or  oxyCODONE, 5 mg, Oral, Q4H PRN  sodium chloride, 1 Application, Nasal, Q1H PRN        Discharge Plan: [facility]    Network Utilization Review Department  ATTENTION: Please call with any questions or concerns to [phone] and carefully listen to the prompts so that you are directed to the right person. All voicemails are confidential.   For Discharge needs, contact Care Management DC Support Team at [phone] opt. 2  Send all requests for admission clinical reviews, approved  or denied determinations and any other requests to dedicated fax number below belonging to the campus where the patient is receiving treatment. List of dedicated fax numbers for the Facilities:  FACILITY NAME UR FAX NUMBER   ADMISSION DENIALS (Administrative/Medical Necessity) [phone]   DISCHARGE SUPPORT TEAM (NETWORK) [phone]   PARENT CHILD HEALTH (Maternity/NICU/Pediatrics) [phone]   [facility] [phone]   [facility] [phone]   [facility] [phone]   [facility] [phone]   [facility] [phone]   [facility] [phone]   [facility] [phone]   [facility] [phone]   [facility] [phone]   [facility] [phone]   [facility] [phone]   [facility] [phone]

## 2024-03-06 NOTE — PROGRESS NOTES
Progress Note - Infectious Disease   Carla Hunt 59 y.o. female MRN: [id number]  [id number]      Impression/Plan:    1. SIRS  versus sepsis.  Fluctuating fever, WBC.  Fevers may be multifactorial due to COVID (still positive antigen and PCR) versus inflammation (seem to correlate to steroid dosing).  Also consider developing bacterial infection.  CRP is elevated today and steroid dosing was increased.  Recent blood cultures 2/26 negative.  CT C/A/P shows cellulitic and subcutaneous gas around the ostomy site and end ileostomy is dusky, suspect this is more due to ischemic changes than true infection.  CT chest also shows ongoing changes of pneumonitis with possible developing bacterial pneumonia in the lateral right middle lobe.  Fever curve overall improved, white blood count normalized but the patient remains on high flow nasal cannula and has persistent encephalopathy.  CT head with sinusitis but no other acute changes on imaging.  -Continue steroids, antivirals for COVID as below  -continue IV vancomycin dosing by pharmacy and cefepime 2 g every 8 hours for possible infection on the ostomy versus developing pneumonia  -Monitor abdominal exam  -Surgery is following due to ongoing ischemic changes at the ileostomy site  -Repeat CBC, CRP tomorrow to monitor treatment response  -Monitor fever curve, hemodynamics     2. Recent bacterial pneumonia.  The patient has completed multiple treatment courses over the hospitalization.  Most recent BAL culture with Pseudomonas and stenotrophomonas.  Unclear if pathogens or colonizers.  Status post 10 days levofloxacin.  CT C/A/P showed evolving changes representing combination of evolving pneumonitis secondary to viral COVID infection, improving bacterial infection in the posterior lower lung zones, developing consolidation in the lateral aspect of the right middle lobe concerning for bacterial infection.  Patient currently on high flow  nasal cannula   -continue IV cefepime and vancomycin as above   -If worsening respiratory status, switch IV cefepime to IV meropenem   -Wean O2 as able     3. Severe COVID, present on admission.  Patient was treated with a 10-day course of remdesivir, dexamethasone and was given 1 dose of Tocilizumab on admission.  COVID antigen was negative prior to coming off isolation.  Had positive COVID PCR from BAL 2/16, status post another 5-day course of remdesivir.  Patient has remained on high-dose systemic corticosteroid throughout her hospitalization which were recently intensified 2/26 for fevers.  Patient having persistent fevers, hypoxia.  COVID antigen positive and PCR is also positive 3/3 and Ct 26.8, consistent with high viral replication  -Continue remdesivir/Paxlovid per persistent COVID guidelines, continue for at least 10 days through 3/11/2024.  -Will resend COVID PCR 3/11/2024.  If cycle threshold remains below 30, will extend therapy to complete a total of 14 days     4. Acute hypoxic respiratory failure, likely multifactorial, with both COVID and bacterial pneumonia contributing.  Also consider persistent inflammation, fibrosis as seems quite steroid responsive.  Most recently extubated 3/1.  On HFNC  -Antivirals as above  -steroids per critical care     5. 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  Patient is status post exploratory laparotomy with ileocecectomy and end ileostomy creation 2/20.  CT now concerning for cellulitic changes around the ostomy.  End ileostomy is with ongoing ischemic changes which is likely contributing to the imaging findings and ongoing SIRS response  -Surgery follow-up  -Antibiotics as above     B-cell lymphoma, on maintenance rituxan, which is currently postponed.  Rituximab is a risk factor for persistent COVID infection.    Above management plan to continue antivirals and antibiotics discussed with Dr Cardenas of critical care who is in agreement. Discussed with  patient's  at bedside. ID will follow.    Antibiotics:  Day 5 Paxlovid/remdesivir  Day 3 vancomycin/Cefepime    Subjective:  Patient remains on high flow nasal cannula.  She remains very lethargic but does open her eyes to voice.  Does not appear to be in any pain.  Continues to have bloody output from her ileostomy.    Objective:  Vitals:  Temp:  [99.4 °F (37.4 °C)-99.7 °F (37.6 °C)] 99.6 °F (37.6 °C)  HR:  [] 112  Resp:  [19-33] 26  BP: (119-162)/(65-87) 147/81  SpO2:  [89 %-100 %] 96 %  Temp (24hrs), Av.5 °F (37.5 °C), Min:99.4 °F (37.4 °C), Max:99.7 °F (37.6 °C)  Current: Temperature: 99.6 °F (37.6 °C)    Physical Exam:   General Appearance:  Ill-appearing, lethargic   Throat: Oropharynx moist without lesions.    Lungs:   Rhonchi bilaterally   Heart:  RRR; no murmur, rub or gallop   Abdomen:   Ostomy present with melena      Extremities: Tenderness of the left knee joint without effusion or erythema present   Skin: No new rashes or lesions.        Labs:   All pertinent labs and imaging studies were personally reviewed  Results from last 7 days   Lab Units 24  1108 24  0441 24  0458 24  1509 24  1044   WBC Thousand/uL  --  7.51 14.78*  --  17.10*   HEMOGLOBIN g/dL 8.8* 6.5* 7.5*   < > 7.5*   PLATELETS Thousands/uL  --  238 323  --  266    < > = values in this interval not displayed.     Results from last 7 days   Lab Units 24  0441 24  1619 24  0458 24  1828 24  0605 24  2027 24  0448 24  1832 24  0510   SODIUM mmol/L 147 152* 152*   < > 159*   < > 150*   < > 148*   POTASSIUM mmol/L 3.3* 4.0 3.0*   < > 3.6   < > 3.4*   < > 3.7   CHLORIDE mmol/L 111* 121* 114*   < > 123*   < > 112*   < > 108   CO2 mmol/L 26 24 27   < > 28   < > 32   < > 29   BUN mg/dL 31* 28* 26*   < > 34*   < > 27*   < > 28*   CREATININE mg/dL 0.63 0.63 0.49*   < > 0.54*   < > 0.53*   < > 0.51*   EGFR ml/min/1.73sq m 99 99 107   < > 104   < > 104    < > 106   CALCIUM mg/dL 9.2 9.3 9.5   < > 10.1   < > 9.8   < > 10.1   AST U/L  --   --   --   --  8*  --  11*  --  20   ALT U/L  --   --   --   --  17  --  19  --  27   ALK PHOS U/L  --   --   --   --  61  --  60  --  66    < > = values in this interval not displayed.         Results from last 7 days   Lab Units 03/06/24  0441 03/05/24  0458 03/04/24  0605 03/02/24  0006 02/29/24  0429   CRP mg/L 172.3* 97.3* 215.2* 46.0* 63.1*     Results from last 7 days   Lab Units 03/03/24  0448   FERRITIN ng/mL 974*     Results from last 7 days   Lab Units 03/04/24  0605 02/29/24  1157   D-DIMER QUANTITATIVE ug/ml FEU 1.47* 1.64*         Imaging:  CT chest, abdomen, pelvis personally reviewed and shows evolving changes of the lungs representing combination of pneumonitis, developing focal bacterial type pneumonia in the lateral aspect of the right middle lobe, cellulitic edema and subcutaneous gas in the soft tissue surrounding the ostomy site.

## 2024-03-06 NOTE — PROGRESS NOTES
Vancomycin IV Pharmacy-to-Dose Consultation    Carla Hunt is a 59 y.o. female who is currently receiving Vancomycin IV with management by the Pharmacy Consult service.    Relevant clinical data and objective / subjective history reviewed.      Vancomycin Assessment:  Indication: Pneumonia (goal -600, trough >10)    Status: critically ill  Micro:   - 3/5 MRSA culture: pending  - 3/3 COVID: (+)  Procalcitonin: no recent  Renal Function:     Lab Results   Component Value Date    CREATININE 0.63 03/06/2024     Estimated Creatinine Clearance: 98.2 mL/min (by C-G formula based on SCr of 0.63 mg/dL).  Dialysis: no  Days of Therapy: 3  Current Dose: 1250 mg IV q12h   Goal AUC / Trough: -600, trough >10   Last Level: 29.1 on 3/6  Model Fit:  good  Assessment: ID consulted. WBC elevated, afebrile.       Vancomycin Plan:  New Dosing: change to 1500 mg IV q24h   Predicted Trough / AUC: 11.6 / 530  Next Level: on 3/13 at 0600  Renal Function Monitoring: Daily BMP and UOP      Pharmacy will continue to follow closely for s/sx of nephrotoxicity, infusion reactions and appropriateness of therapy.  BMP and CBC will be ordered per protocol. We will continue to follow the patient’s culture results and clinical progress daily.       Gabe Henriquez, PharmD, BCCCP  Critical Care Clinical Pharmacist  Available via [employer]

## 2024-03-06 NOTE — PROGRESS NOTES
[facility]  Progress Note: Critical Care  Name: Carla Hunt 59 y.o. 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  Anemia requiring transfusion  B-cell lymphoma with history of treatment with Rituxan  Minimal SAH with no neurosurgical intervention indicated at the time  Seizure disorder s/p left temporal lobe partial resection  Steroid-induced hyperglycemia  Sacral ulcers bilaterally    Neuro:   Sedation: Off sedation  -Monitor for withdrawal in context of patients history of seizures     Diagnosis: Analgesia  -On oxycodone 2.5 mg and 5 mg every 4 hours PRN     Diagnosis: seizure disorder  -S/p partial left temporal lobe resection in 2007  -On Lamictal 150 bid and Topamax 100 bid     Diagnosis: Anxiety  -On venlafaxine 25 mg QD via NGT  -Gabapentin 100 mg QHS (last dose today)     CV:   Diagnosis: Distributive shock, resolved  -Off pressors  -Maintain MAPs > 65 mmHg     Pulm:  Diagnosis: Acute hypoxic respiratory failure due to severe covid  and covid induced fibrosis  -Completed severe COVID pathway and 7 days CTX initially. Then completed 5 day course of remdesevir in late February for Covid19 positive PCR from bronchoscopic cultures. Now covid positive with antigen testing  -S/p Bronch 2/2, 02/16, 02/21  -PCP and AFB negative   -Legionella, viral, fungal cultures no growth to date  -Pulse dose steroids with solumedrol 1g daily x3  days (completed 2/7) then transitioned to prednisone 80mg daily on 2/8. Also completed veletri treatment and 5 day course of IVIG.   -CT scan 2/24: general improvement of areas of consolidation and some areas of groundglass opacification on the lungs, still with significant groundglass opacification especially in the lower lobes. Bilateral consolidations in lower lobes. No evidence of PE.  -Currently on solumedrol 125 mg every 12 hours, can consider taper to 60 mg Q8H   -Planning for CRP and D-dimer every 48 hours  -CRP trend  291 (02/27) > 133 (02/28) > 63.1 (02/29) > 46 (03/02) > 215.2 (03/04)  -Extubated 03/01. Was on bipap  -Currently on Bipap, plan to switch to HFNC     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  -Monitor output of ostomy pouch and Hemoglobin  -Stoma appearing slightly retracted, still having some output  -Surgery following, will keep them updated if any further changes  -Monitor ostomy output  -Tube feeds running     -On pantoprazole 40 mg QD for GI prophylaxis and to reduce risk of upper GI bleed in this patient on significant amount of steroid regimen     :   Diagnosis: CKD III  -Baseline Cr appears to be 0.8-1.2  -UA unremarkable   -Upon chart review 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) though unclear how this was diagnosed, this also does not enirely fit as she is NOT hypokalemic  -Continue to monitor I/O and UOP     Diagnosis: Hypernatremia, now resolved, 147     Diagnosis: Acute kidney injury, sCr at 0.99, resolved     F/E/N:   F: Off IVF  E: monitor and replete for goal K>4, P>3, Mg>2  N: On tube feeds with glucerna with free water flushes 350 mL every 4 hours     Heme/Onc:   Diagnosis: Anemia  Likely multifactorial in nature, acute in the setting of recent sepsis/inflammatory state complicated by chronic anemia due to her other history of lymphoma and CKD  -Hgb at 7.5 this morning  -She did receive 1 unit  transfusion on 02/29  -Iron panel with ferritin 974, iron 10, TIBC 173     Diagnosis: Thrombocytopenia, resolved. At 250s today.  -Will continue to monitor  -Monitor sites for bleeding  -Platelet levels at  this morning     Diagnosis: B cell lymphoma  -Follows with heme/onc at [facility]  -On rituxan for 2 year course, started May 2022  -Last dose was 12/20, treatment currently on hold   -Leukopenic on admission but WBC now wnl    DVT ppx with lovenox     Endo:   Diagnosis: steroid hyperglycemia and diabetes mellitus type 2, A1c at 6.6 from 01/2024  -Goal -180  -BG range last 24hrs 126-259  -On insulin drip     ID:   Diagnosis: Severe covid pneumonia and stenotrophomonas pneumonia  -Completed severe COVID pathway with decadron (ended 1/22) and remdesivir (ended 1/20), also completed 7 days CTX on 1/15  -C/f opportunistic infections given immunocompromised status on chemotherapy and recent steroid use  -No consolidations on CXR and procal negative  -S/p bactrim and minocycline x5 days for stenotrophomonas on sputum culture (1/25), then on bactrim for PJP ppx  -Bronc on 2/2 - Cx w/o growth (initially resulted as 1+ alpha hemolytic strep), AFB & PCP negative, f/u fungal, legionella, and viral cultures   -UA with moderate leukocytes, nitrite negative, 30-50 WBC, trace protein.  -Repeat sputum culture 2/9 with 4+ stenotrophomonas  -Bronch cultures with candida albicans, Rare gram positive cocci in pairs  -Previously completed 14 day course of antibiotics for stenotrophomonas pneumonia  -Levaquin continued for an additional 7 days since bronchial cultures with pseudomonas MDR susceptible to levaquin. Levaquin discontinued at this point since patient has likely completed >7 days of therapy for pseudomonas susceptible to antibiotics.  -Currently off of antibiotics, on remdesevir and paxlovid for Antigen positive test for covid  -RSV/Flu/Covid panel positive, we are using that to assess the amount of covid viral particle  shedding quantitatively as we continue to treat her with remdesevir and paxlovid  -On Vancomycin and cefepime to cover treatment for stoma-wall cellulitis and possible pneumonia, day 3 today. May have to consider adding anaerobic coverage  -On remdesecir and paxlovid for 14 day course     MSK/Skin:   -Wound care team following for bilateral sacral wounds    Disposition: Critical care    ICU Core Measures     A: Assess, Prevent, and Manage Pain Has pain been assessed? Yes  Need for changes to pain regimen? No   B: Both SAT/SAT  N/A   C: Choice of Sedation RASS Goal: N/A patient not on sedation  Need for changes to sedation or analgesia regimen? NA   D: Delirium CAM-ICU: Negative   E: Early Mobility  Plan for early mobility? Yes   F: Family Engagement Plan for family engagement today? Yes       Antibiotic Review: Patient on appropriate coverage based on culture data.     Review of Invasive Devices:    Ortiz Plan: Continue for accurate I/O monitoring for 48 hours  Central access plan:  Plan on switching from       Prophylaxis:  VTE VTE covered by:  enoxaparin, Subcutaneous, 40 mg at 03/05/24 0904       Stress Ulcer  covered bypantoprazole (PROTONIX) injection 40 mg [[id number]]        Significant 24hr Events     24hr events: Overnight, Surgery contacted about appearance of/ bleeding from ostomy- nothing to do for now. Hemoglobin this morning 6.5. Given 1 unit PRBC. Reached out to surgery for additional recommendations. Also got 0.5 mg of dilaudid and 2 mg versed.      Subjective   Patient seen by bedside, plan to reach out to patients family to discuss medical updates today.     Review of Systems   Unable to perform ROS: Patient nonverbal        Objective                            Vitals I/O      Most Recent Min/Max in 24hrs   Temp 99.4 °F (37.4 °C) Temp  Min: 99.4 °F (37.4 °C)  Max: 99.7 °F (37.6 °C)   Pulse (!) 120 Pulse  Min: 104  Max: 131   Resp (!) 24 Resp  Min: 16  Max: 31   /79 BP  Min: 96/55  Max: 160/83    O2 Sat 99 % SpO2  Min: 88 %  Max: 100 %   On bipap 16/8 70% FiO2   Intake/Output Summary (Last 24 hours) at 3/6/2024 0609  Last data filed at 3/6/2024 0600  Gross per 24 hour   Intake 4185.46 ml   Output 2835 ml   Net 1350.46 ml       Diet Enteral/Parenteral; Tube Feeding No Oral Diet; Glucerna 1.2; Continuous; 80; Prosource Protein Liquid - One Packet; 350; Water; Every 4 hours    Invasive Monitoring           Physical Exam   Physical Exam  Vitals reviewed.   Skin:     General: Skin is warm.      Capillary Refill: Capillary refill takes less than 2 seconds.   HENT:      Head: Normocephalic.   Cardiovascular:      Rate and Rhythm: Tachycardia present.      Pulses: Normal pulses.   Abdominal:      Palpations: Abdomen is soft.      Comments: Ostomy site with dark red blood mixed with stool   Constitutional:       Appearance: She is ill-appearing.      Comments: On bipap. Has tube feeds running via NG tube. Ortiz in place.    Pulmonary:      Comments: Decreased effort, noted to have respiratory muscle weakness. Breath sounds slightly diminished in lower lung fields  Neurological:      Comments: Able to follow commands, able to move extremities spontaneously            Diagnostic Studies      EKG: Reviewed  Imaging: Reviewed I have personally reviewed pertinent reports.       Medications:  Scheduled PRN   cefepime, 2,000 mg, Q8H  chlorhexidine, 15 mL, Q12H SARTHAK  enoxaparin, 40 mg, Q24H SARTHAK  gabapentin, 100 mg, HS  ipratropium, 0.5 mg, TID  lamoTRIgine, 150 mg, BID  levalbuterol, 1.25 mg, TID  methylPREDNISolone sodium succinate, 70 mg, Q8H SARTHAK  nirmatrelvir & ritonavir, 3 tablet, BID  nystatin, , BID  pantoprazole, 40 mg, Q24H SARTHAK  polyethylene glycol, 17 g, Daily  potassium chloride, 40 mEq, Once  potassium chloride, 40 mEq, Once  remdesivir, 100 mg, Q24H  senna, 1 tablet, BID  sodium chloride, 2 spray, Q4H  topiramate, 100 mg, BID  vancomycin, 1,000 mg, Q12H  venlafaxine, 25 mg, Daily      acetaminophen, 650 mg, Q6H  PRN  hydrALAZINE, 10 mg, Q6H PRN  OLANZapine, 10 mg, HS PRN  ondansetron, 4 mg, Q6H PRN  oxyCODONE, 2.5 mg, Q4H PRN   Or  oxyCODONE, 5 mg, Q4H PRN  sodium chloride, 1 Application, Q1H PRN       Continuous    insulin regular (HumuLIN R,NovoLIN R) 1 Units/mL in sodium chloride 0.9 % 100 mL infusion, 0.3-21 Units/hr, Last Rate: 8 Units/hr (03/06/24 0420)         Labs:    CBC    Recent Labs     03/05/24  0458 03/06/24  0441   WBC 14.78* 7.51   HGB 7.5* 6.5*   HCT 24.1* 21.5*    238     BMP    Recent Labs     03/05/24  1619 03/06/24  0441   SODIUM 152* 147   K 4.0 3.3*   * 111*   CO2 24 26   AGAP 7 10   BUN 28* 31*   CREATININE 0.63 0.63   CALCIUM 9.3 9.2    K repleted. Calcium ordered.    Coags    No recent results     Additional Electrolytes  Recent Labs     03/05/24  0458 03/06/24  0441   MG 2.2 2.1          Blood Gas    No recent results  No recent results LFTs  No recent results    Infectious  No recent results  Glucose  Recent Labs     03/04/24  1828 03/05/24  0458 03/05/24  1619 03/06/24  0441   GLUC 106 103 187* 206*               Miguel Whittaker MD

## 2024-03-06 NOTE — QUICK NOTE
[facility] consult received yesterday, 3/5/24, initially for midline and then changed to PICC. Discussed with ordering provider 3/5, Dr CHRISTEN Whittaker, who indicated that PICC was desired so they could d/c TL IJ CVC and to have CVC in place in case pt required pressors again. At the time, afternoon of 3/5, pt did not have any indication for central access. Offered that midline could be placed and if needs changed could convert to a PICC at a later date. However, pt's status has changed slightly and she is still critically ill. Current IJ CVC is noted to be functioning without complications. Per guidelines, [facility] does not replace a CVC with a CVC unless there is anticipated long term CVC needs (e.g. long term TPN admin) or complications with existing line. Reviewed pt's case with [facility] team Director, Dr Shen, who agrees with original assessment that current IJ CVC is the most appropriate venous access device for this pt at this time. Communicated this with pt's primary RN, YAHIR Oliveira who reports sharing of this information with primary team. [facility] consult will be completed at this time. Please do not hesitate to reach out via TT, or place a new consult, if pt's status or needs change.

## 2024-03-06 NOTE — WOUND OSTOMY CARE
Progress Note - Wound   Carla Hunt 59 y.o. female MRN: [id number]  Unit/Bed#: MICU 10 Encounter: [id number]        Assessment:   Patient seen today for wound care follow up assessment. Patient is . Patient is dependent for all care, on high flow O2 in MICU, on critical care low air loss mattress. Patient is receiving parenteral nutrition. Patient is turned and repositioned with green foam wedges. Family at bedside updated on findings.     HA stage III-  100% pink/yellow tissue partial thickness, no drainage. Wound has improved in size with this week's assessment.      B/L heels intact and blanching, preventative skin care orders placed.      No induration, fluctuance, odor, warmth/temperature differences, redness, or purulence noted to the above noted wounds and skin areas assessed. New dressings applied per orders listed below. Patient tolerated well- no s/s of non-verbal pain or discomfort observed during the encounter. Bedside nurse aware of plan of care. See flow sheets for more detailed assessment findings. Wound care will continue to follow.         Plan:   Cleanse b/l sacro-buttocks with soap and water, pat dry, and apply bordered silicone foam dressing to cover the wounds. Change every other day and as needed for soilage/dislodgement.      Cleanse b/l groin with soap and water, pat dry, and dust the skin lightly with nystatin powder per order. May use alginate rope to skin folds if skin is open or macerated. Change BID and PRN.    Ostomy teaching when more medically stable.    Wound 02/12/24 Pressure Injury Buttocks (Active)   Wound Image   03/06/24 1416   Wound Description Pink;Yellow 03/06/24 1416   Pressure Injury Stage 3 03/06/24 1416   Sofie-wound Assessment Fragile;Pink 03/06/24 1416   Wound Length (cm) 1 cm 03/06/24 1416   Wound Width (cm) 0.4 cm 03/06/24 1416   Wound Depth (cm) 0.1 cm 03/06/24 1416   Wound Surface Area (cm^2) 0.4 cm^2 03/06/24 1416   Wound Volume (cm^3) 0.04 cm^3 03/06/24 1416    Calculated Wound Volume (cm^3) 0.04 cm^3 03/06/24 1416   Change in Wound Size % 73.33 03/06/24 1416   Wound Site Closure WILL 03/06/24 1416   Drainage Amount None 03/06/24 1416   Drainage Description Serous 03/01/24 0405   Non-staged Wound Description Partial thickness 03/06/24 1416   Treatments Cleansed 03/06/24 1416   Dressing Foam, Silicon (eg. Allevyn, etc) 03/06/24 1416   Wound packed? No 03/06/24 1416   Packing- # removed 0 03/06/24 1416   Packing- # inserted 0 03/06/24 1416   Dressing Changed New 03/06/24 1416   Patient Tolerance Tolerated well 03/06/24 1416   Dressing Status Clean;Dry;Intact 03/06/24 1416           Molly Bustos BSN, RN, CWOCN

## 2024-03-07 LAB
ABO GROUP BLD BPU: NORMAL
ANION GAP SERPL CALCULATED.3IONS-SCNC: 11 MMOL/L
ANION GAP SERPL CALCULATED.3IONS-SCNC: 8 MMOL/L
ATRIAL RATE: 98 BPM
BPU ID: NORMAL
BUN SERPL-MCNC: 32 MG/DL (ref 5–25)
BUN SERPL-MCNC: 34 MG/DL (ref 5–25)
CALCIUM SERPL-MCNC: 8 MG/DL (ref 8.4–10.2)
CALCIUM SERPL-MCNC: 8.6 MG/DL (ref 8.4–10.2)
CHLORIDE SERPL-SCNC: 112 MMOL/L (ref 96–108)
CHLORIDE SERPL-SCNC: 115 MMOL/L (ref 96–108)
CO2 SERPL-SCNC: 24 MMOL/L (ref 21–32)
CO2 SERPL-SCNC: 27 MMOL/L (ref 21–32)
CREAT SERPL-MCNC: 0.48 MG/DL (ref 0.6–1.3)
CREAT SERPL-MCNC: 0.55 MG/DL (ref 0.6–1.3)
CROSSMATCH: NORMAL
CRP SERPL QL: 109.6 MG/L
ERYTHROCYTE [DISTWIDTH] IN BLOOD BY AUTOMATED COUNT: 20.1 % (ref 11.6–15.1)
GFR SERPL CREATININE-BSD FRML MDRD: 103 ML/MIN/1.73SQ M
GFR SERPL CREATININE-BSD FRML MDRD: 108 ML/MIN/1.73SQ M
GLUCOSE SERPL-MCNC: 105 MG/DL (ref 65–140)
GLUCOSE SERPL-MCNC: 127 MG/DL (ref 65–140)
GLUCOSE SERPL-MCNC: 131 MG/DL (ref 65–140)
GLUCOSE SERPL-MCNC: 135 MG/DL (ref 65–140)
GLUCOSE SERPL-MCNC: 137 MG/DL (ref 65–140)
GLUCOSE SERPL-MCNC: 141 MG/DL (ref 65–140)
GLUCOSE SERPL-MCNC: 173 MG/DL (ref 65–140)
GLUCOSE SERPL-MCNC: 177 MG/DL (ref 65–140)
GLUCOSE SERPL-MCNC: 229 MG/DL (ref 65–140)
GLUCOSE SERPL-MCNC: 292 MG/DL (ref 65–140)
GLUCOSE SERPL-MCNC: 297 MG/DL (ref 65–140)
GLUCOSE SERPL-MCNC: 329 MG/DL (ref 65–140)
GLUCOSE SERPL-MCNC: 334 MG/DL (ref 65–140)
HCT VFR BLD AUTO: 25 % (ref 34.8–46.1)
HCT VFR BLD AUTO: 26.8 % (ref 34.8–46.1)
HGB BLD-MCNC: 8.1 G/DL (ref 11.5–15.4)
HGB BLD-MCNC: 8.1 G/DL (ref 11.5–15.4)
HGB BLD-MCNC: 8.6 G/DL (ref 11.5–15.4)
MAGNESIUM SERPL-MCNC: 2.2 MG/DL (ref 1.9–2.7)
MCH RBC QN AUTO: 30.1 PG (ref 26.8–34.3)
MCHC RBC AUTO-ENTMCNC: 32.4 G/DL (ref 31.4–37.4)
MCV RBC AUTO: 93 FL (ref 82–98)
P AXIS: 59 DEGREES
PHOSPHATE SERPL-MCNC: 2.5 MG/DL (ref 2.7–4.5)
PLATELET # BLD AUTO: 250 THOUSANDS/UL (ref 149–390)
PMV BLD AUTO: 12.3 FL (ref 8.9–12.7)
POTASSIUM SERPL-SCNC: 2.7 MMOL/L (ref 3.5–5.3)
POTASSIUM SERPL-SCNC: 3.6 MMOL/L (ref 3.5–5.3)
PR INTERVAL: 134 MS
QRS AXIS: 33 DEGREES
QRSD INTERVAL: 74 MS
QT INTERVAL: 400 MS
QTC INTERVAL: 510 MS
RBC # BLD AUTO: 2.69 MILLION/UL (ref 3.81–5.12)
SODIUM SERPL-SCNC: 147 MMOL/L (ref 135–147)
SODIUM SERPL-SCNC: 150 MMOL/L (ref 135–147)
T WAVE AXIS: 48 DEGREES
UNIT DISPENSE STATUS: NORMAL
UNIT PRODUCT CODE: NORMAL
UNIT PRODUCT VOLUME: 350 ML
UNIT RH: NORMAL
VENTRICULAR RATE: 98 BPM
WBC # BLD AUTO: 7.75 THOUSAND/UL (ref 4.31–10.16)

## 2024-03-07 PROCEDURE — 94760 N-INVAS EAR/PLS OXIMETRY 1: CPT

## 2024-03-07 PROCEDURE — 83735 ASSAY OF MAGNESIUM: CPT | Performed by: STUDENT IN AN ORGANIZED HEALTH CARE EDUCATION/TRAINING PROGRAM

## 2024-03-07 PROCEDURE — 80048 BASIC METABOLIC PNL TOTAL CA: CPT | Performed by: STUDENT IN AN ORGANIZED HEALTH CARE EDUCATION/TRAINING PROGRAM

## 2024-03-07 PROCEDURE — 97535 SELF CARE MNGMENT TRAINING: CPT

## 2024-03-07 PROCEDURE — 93005 ELECTROCARDIOGRAM TRACING: CPT

## 2024-03-07 PROCEDURE — 99233 SBSQ HOSP IP/OBS HIGH 50: CPT | Performed by: INTERNAL MEDICINE

## 2024-03-07 PROCEDURE — 94640 AIRWAY INHALATION TREATMENT: CPT

## 2024-03-07 PROCEDURE — 97110 THERAPEUTIC EXERCISES: CPT

## 2024-03-07 PROCEDURE — 82948 REAGENT STRIP/BLOOD GLUCOSE: CPT

## 2024-03-07 PROCEDURE — 97530 THERAPEUTIC ACTIVITIES: CPT

## 2024-03-07 PROCEDURE — 94660 CPAP INITIATION&MGMT: CPT

## 2024-03-07 PROCEDURE — 85027 COMPLETE CBC AUTOMATED: CPT | Performed by: STUDENT IN AN ORGANIZED HEALTH CARE EDUCATION/TRAINING PROGRAM

## 2024-03-07 PROCEDURE — 85018 HEMOGLOBIN: CPT

## 2024-03-07 PROCEDURE — 84100 ASSAY OF PHOSPHORUS: CPT | Performed by: STUDENT IN AN ORGANIZED HEALTH CARE EDUCATION/TRAINING PROGRAM

## 2024-03-07 PROCEDURE — 85018 HEMOGLOBIN: CPT | Performed by: STUDENT IN AN ORGANIZED HEALTH CARE EDUCATION/TRAINING PROGRAM

## 2024-03-07 PROCEDURE — 99233 SBSQ HOSP IP/OBS HIGH 50: CPT | Performed by: STUDENT IN AN ORGANIZED HEALTH CARE EDUCATION/TRAINING PROGRAM

## 2024-03-07 PROCEDURE — 93010 ELECTROCARDIOGRAM REPORT: CPT | Performed by: INTERNAL MEDICINE

## 2024-03-07 PROCEDURE — 80175 DRUG SCREEN QUAN LAMOTRIGINE: CPT | Performed by: STUDENT IN AN ORGANIZED HEALTH CARE EDUCATION/TRAINING PROGRAM

## 2024-03-07 PROCEDURE — 85014 HEMATOCRIT: CPT

## 2024-03-07 PROCEDURE — 86140 C-REACTIVE PROTEIN: CPT | Performed by: STUDENT IN AN ORGANIZED HEALTH CARE EDUCATION/TRAINING PROGRAM

## 2024-03-07 RX ORDER — ONDANSETRON 2 MG/ML
4 INJECTION INTRAMUSCULAR; INTRAVENOUS EVERY 6 HOURS PRN
Status: DISCONTINUED | OUTPATIENT
Start: 2024-03-07 | End: 2024-04-18

## 2024-03-07 RX ORDER — POTASSIUM CHLORIDE 20MEQ/15ML
40 LIQUID (ML) ORAL ONCE
Status: COMPLETED | OUTPATIENT
Start: 2024-03-07 | End: 2024-03-07

## 2024-03-07 RX ORDER — METOCLOPRAMIDE HYDROCHLORIDE 5 MG/ML
5 INJECTION INTRAMUSCULAR; INTRAVENOUS EVERY 6 HOURS PRN
Status: DISCONTINUED | OUTPATIENT
Start: 2024-03-07 | End: 2024-03-08

## 2024-03-07 RX ORDER — METHYLPREDNISOLONE SODIUM SUCCINATE 125 MG/2ML
60 INJECTION, POWDER, LYOPHILIZED, FOR SOLUTION INTRAMUSCULAR; INTRAVENOUS EVERY 8 HOURS SCHEDULED
Status: COMPLETED | OUTPATIENT
Start: 2024-03-07 | End: 2024-03-08

## 2024-03-07 RX ORDER — OXYMETAZOLINE HYDROCHLORIDE 0.05 G/100ML
2 SPRAY NASAL 2 TIMES DAILY
Qty: 1.2 ML | Refills: 0 | Status: DISPENSED | OUTPATIENT
Start: 2024-03-07 | End: 2024-03-10

## 2024-03-07 RX ORDER — ACETAMINOPHEN 10 MG/ML
1000 INJECTION, SOLUTION INTRAVENOUS ONCE
Status: COMPLETED | OUTPATIENT
Start: 2024-03-07 | End: 2024-03-07

## 2024-03-07 RX ORDER — POTASSIUM CHLORIDE 29.8 MG/ML
40 INJECTION INTRAVENOUS ONCE
Status: COMPLETED | OUTPATIENT
Start: 2024-03-07 | End: 2024-03-07

## 2024-03-07 RX ORDER — VANCOMYCIN HYDROCHLORIDE 1 G/200ML
1000 INJECTION, SOLUTION INTRAVENOUS EVERY 12 HOURS
Status: DISCONTINUED | OUTPATIENT
Start: 2024-03-07 | End: 2024-03-08

## 2024-03-07 RX ORDER — HYDROMORPHONE HCL/PF 1 MG/ML
0.5 SYRINGE (ML) INJECTION ONCE
Status: COMPLETED | OUTPATIENT
Start: 2024-03-07 | End: 2024-03-07

## 2024-03-07 RX ADMIN — Medication 2 SPRAY: at 18:38

## 2024-03-07 RX ADMIN — CHLORHEXIDINE GLUCONATE 0.12% ORAL RINSE 15 ML: 1.2 LIQUID ORAL at 20:00

## 2024-03-07 RX ADMIN — TOPIRAMATE 100 MG: 100 TABLET, FILM COATED ORAL at 10:46

## 2024-03-07 RX ADMIN — POTASSIUM CHLORIDE 40 MEQ: 1.5 SOLUTION ORAL at 06:08

## 2024-03-07 RX ADMIN — ALTEPLASE 2 MG: 2.2 INJECTION, POWDER, LYOPHILIZED, FOR SOLUTION INTRAVENOUS at 06:18

## 2024-03-07 RX ADMIN — OXYCODONE HYDROCHLORIDE 5 MG: 5 SOLUTION ORAL at 20:08

## 2024-03-07 RX ADMIN — LAMOTRIGINE 150 MG: 100 TABLET ORAL at 09:17

## 2024-03-07 RX ADMIN — CEFEPIME 2000 MG: 2 INJECTION, POWDER, FOR SOLUTION INTRAVENOUS at 07:55

## 2024-03-07 RX ADMIN — LEVALBUTEROL HYDROCHLORIDE 1.25 MG: 1.25 SOLUTION RESPIRATORY (INHALATION) at 19:38

## 2024-03-07 RX ADMIN — LEVALBUTEROL HYDROCHLORIDE 1.25 MG: 1.25 SOLUTION RESPIRATORY (INHALATION) at 07:51

## 2024-03-07 RX ADMIN — SENNOSIDES 8.6 MG: 8.6 TABLET, FILM COATED ORAL at 09:17

## 2024-03-07 RX ADMIN — CEFEPIME 2000 MG: 2 INJECTION, POWDER, FOR SOLUTION INTRAVENOUS at 16:42

## 2024-03-07 RX ADMIN — NIRMATRELVIR AND RITONAVIR 3 TABLET: KIT at 09:17

## 2024-03-07 RX ADMIN — OXYMETAZOLINE HYDROCHLORIDE 2 SPRAY: 0.05 SPRAY NASAL at 11:50

## 2024-03-07 RX ADMIN — REMDESIVIR 100 MG: 100 INJECTION, POWDER, LYOPHILIZED, FOR SOLUTION INTRAVENOUS at 14:47

## 2024-03-07 RX ADMIN — SENNOSIDES 8.6 MG: 8.6 TABLET, FILM COATED ORAL at 18:38

## 2024-03-07 RX ADMIN — METHYLPREDNISOLONE SODIUM SUCCINATE 60 MG: 125 INJECTION, POWDER, FOR SOLUTION INTRAMUSCULAR; INTRAVENOUS at 14:08

## 2024-03-07 RX ADMIN — NYSTATIN: 100000 POWDER TOPICAL at 09:17

## 2024-03-07 RX ADMIN — NIRMATRELVIR AND RITONAVIR 3 TABLET: KIT at 18:38

## 2024-03-07 RX ADMIN — ACETAMINOPHEN 1000 MG: 10 INJECTION INTRAVENOUS at 11:47

## 2024-03-07 RX ADMIN — FAMOTIDINE 20 MG: 20 TABLET, FILM COATED ORAL at 09:17

## 2024-03-07 RX ADMIN — FAMOTIDINE 20 MG: 20 TABLET, FILM COATED ORAL at 18:38

## 2024-03-07 RX ADMIN — ENOXAPARIN SODIUM 40 MG: 40 INJECTION SUBCUTANEOUS at 09:17

## 2024-03-07 RX ADMIN — Medication 2 SPRAY: at 09:17

## 2024-03-07 RX ADMIN — SILVER NITRATE APPLICATORS 1 APPLICATOR: 25; 75 STICK TOPICAL at 14:07

## 2024-03-07 RX ADMIN — GABAPENTIN 100 MG: 250 SOLUTION ORAL at 00:31

## 2024-03-07 RX ADMIN — METHYLPREDNISOLONE SODIUM SUCCINATE 60 MG: 125 INJECTION, POWDER, FOR SOLUTION INTRAMUSCULAR; INTRAVENOUS at 22:08

## 2024-03-07 RX ADMIN — Medication 2 SPRAY: at 05:51

## 2024-03-07 RX ADMIN — NYSTATIN: 100000 POWDER TOPICAL at 18:38

## 2024-03-07 RX ADMIN — LEVALBUTEROL HYDROCHLORIDE 1.25 MG: 1.25 SOLUTION RESPIRATORY (INHALATION) at 13:33

## 2024-03-07 RX ADMIN — Medication 2 SPRAY: at 14:51

## 2024-03-07 RX ADMIN — IPRATROPIUM BROMIDE 0.5 MG: 0.5 SOLUTION RESPIRATORY (INHALATION) at 13:33

## 2024-03-07 RX ADMIN — IPRATROPIUM BROMIDE 0.5 MG: 0.5 SOLUTION RESPIRATORY (INHALATION) at 07:51

## 2024-03-07 RX ADMIN — CEFEPIME 2000 MG: 2 INJECTION, POWDER, FOR SOLUTION INTRAVENOUS at 00:30

## 2024-03-07 RX ADMIN — POTASSIUM CHLORIDE 40 MEQ: 1.5 SOLUTION ORAL at 05:47

## 2024-03-07 RX ADMIN — HYDROMORPHONE HYDROCHLORIDE 0.5 MG: 1 INJECTION, SOLUTION INTRAMUSCULAR; INTRAVENOUS; SUBCUTANEOUS at 13:28

## 2024-03-07 RX ADMIN — METHYLPREDNISOLONE SODIUM SUCCINATE 70 MG: 125 INJECTION, POWDER, FOR SOLUTION INTRAMUSCULAR; INTRAVENOUS at 05:47

## 2024-03-07 RX ADMIN — POLYETHYLENE GLYCOL 3350 17 G: 17 POWDER, FOR SOLUTION ORAL at 09:17

## 2024-03-07 RX ADMIN — POTASSIUM CHLORIDE 40 MEQ: 29.8 INJECTION, SOLUTION INTRAVENOUS at 05:47

## 2024-03-07 RX ADMIN — SODIUM CHLORIDE 12 UNITS/HR: 9 INJECTION, SOLUTION INTRAVENOUS at 17:10

## 2024-03-07 RX ADMIN — SILVER NITRATE APPLICATORS 1 APPLICATOR: 25; 75 STICK TOPICAL at 14:30

## 2024-03-07 RX ADMIN — LAMOTRIGINE 150 MG: 100 TABLET ORAL at 18:38

## 2024-03-07 RX ADMIN — VANCOMYCIN HYDROCHLORIDE 1000 MG: 1 INJECTION, SOLUTION INTRAVENOUS at 14:51

## 2024-03-07 RX ADMIN — IPRATROPIUM BROMIDE 0.5 MG: 0.5 SOLUTION RESPIRATORY (INHALATION) at 19:38

## 2024-03-07 RX ADMIN — CHLORHEXIDINE GLUCONATE 0.12% ORAL RINSE 15 ML: 1.2 LIQUID ORAL at 09:17

## 2024-03-07 RX ADMIN — TOPIRAMATE 100 MG: 100 TABLET, FILM COATED ORAL at 18:38

## 2024-03-07 NOTE — UTILIZATION REVIEW
"Continued Stay Review    Date: 03/07                          Current Patient Class: IP  Current Level of Care: Level 2 stepdown    HPI:59 y.o. female initially admitted on 01/10     Assessment/Plan: Pt noted to have a  bloody output from ostomy bag. Hemoglobin 8.1, K 2.7. She was on BIPAP ovn, weaned to HFNC to MFNC @ 10L. MNa 150.  Plan: Continue Lamictal and Topamax hold all sedatives. Continue antibiotics for cellulitis. Wean steroids slowly continue Paxlovid and Remdesivir and check cycle time on Monday. Insulin gtt, repeat CXR today PT. Contine Vital and check residuals      Vital Signs: /59   Pulse 103   Temp 97.5 °F (36.4 °C) (Rectal)   Resp 15   Ht 5' 8\" (1.727 m)   Wt 71.4 kg (157 lb 6.5 oz)   SpO2 100%   BMI 23.93 kg/m²       Pertinent Labs/Diagnostic Results:   Results from last 7 days   Lab Units 03/03/24  1406   SARS-COV-2  Positive*     Results from last 7 days   Lab Units 03/07/24  1328 03/07/24  0435 03/06/24  1108 03/06/24  0441 03/05/24  0458 03/04/24  1044 03/04/24  0605 03/02/24  0443 03/01/24  0538   WBC Thousand/uL  --  7.75  --  7.51 14.78*   < > 16.57*   < > 11.69*   HEMOGLOBIN g/dL 8.6* 8.1* 8.8* 6.5* 7.5*   < > 7.6*   < > 7.8*   HEMATOCRIT % 26.8* 25.0* 28.3* 21.5* 24.1*   < > 25.2*   < > 25.5*   PLATELETS Thousands/uL  --  250  --  238 323   < > 251   < > 89*   BANDS PCT %  --   --   --   --   --   --  2  --  3    < > = values in this interval not displayed.         Results from last 7 days   Lab Units 03/07/24  0435 03/06/24  1419 03/06/24  0441 03/05/24  1619 03/05/24  0458 03/04/24  1828 03/04/24  0605 03/03/24 2027 03/03/24  0448 03/02/24  0510 03/02/24  0510   SODIUM mmol/L 150*  --  147 152* 152* 155* 159*   < > 150*   < >  --    POTASSIUM mmol/L 2.7*  --  3.3* 4.0 3.0* 3.2* 3.6   < > 3.4*   < >  --    CHLORIDE mmol/L 112*  --  111* 121* 114* 119* 123*   < > 112*   < >  --    CO2 mmol/L 27  --  26 24 27 28 28   < > 32   < >  --    ANION GAP mmol/L 11  --  10 7 11 8 8   " "< > 6   < >  --    BUN mg/dL 34*  --  31* 28* 26* 27* 34*   < > 27*   < >  --    CREATININE mg/dL 0.48*  --  0.63 0.63 0.49* 0.49* 0.54*   < > 0.53*   < >  --    EGFR ml/min/1.73sq m 108  --  99 99 107 107 104   < > 104   < >  --    CALCIUM mg/dL 8.6  --  9.2 9.3 9.5 9.4 10.1   < > 9.8   < >  --    CALCIUM, IONIZED mmol/L  --  1.29  --   --   --   --   --   --   --   --   --    MAGNESIUM mg/dL 2.2  --  2.1  --  2.2  --  2.4  --  2.4  --  1.9   PHOSPHORUS mg/dL 2.5*  --   --   --   --   --  2.2*  --  3.1  --  1.8*    < > = values in this interval not displayed.     Results from last 7 days   Lab Units 03/04/24  0605 03/03/24  0448 03/02/24  0510   AST U/L 8* 11* 20   ALT U/L 17 19 27   ALK PHOS U/L 61 60 66   TOTAL PROTEIN g/dL 5.4* 5.3* 5.1*   ALBUMIN g/dL 3.0* 3.0* 3.2*   TOTAL BILIRUBIN mg/dL 0.53 0.81 0.82     Results from last 7 days   Lab Units 03/07/24  1456 03/07/24  1102 03/07/24  0741 03/07/24  0622 03/07/24  0402 03/07/24  0206 03/07/24  0027 03/06/24  2207 03/06/24 2029 03/06/24  1757 03/06/24  1605 03/06/24  1407   POC GLUCOSE mg/dl 173* 292* 177* 135 127 137 105 206* 223* 197* 143* 196*     Results from last 7 days   Lab Units 03/07/24  0435 03/06/24  0441 03/05/24  1619 03/05/24  0458 03/04/24  1828 03/04/24  0605 03/03/24 2027 03/03/24 0448 03/02/24  1832 03/02/24  0510 03/02/24  0423 03/02/24  0006   GLUCOSE RANDOM mg/dL 141* 206* 187* 103 106 179* 328* 255* 210* 145* 147* 152*             No results found for: \"BETA-HYDROXYBUTYRATE\"   Results from last 7 days   Lab Units 03/06/24  1227 03/02/24  0438 03/01/24  0720   PH ART  7.483* 7.481* 7.467*   PCO2 ART mm Hg 33.4* 37.9 41.4   PO2 ART mm Hg 46.4* 77.6 77.5   HCO3 ART mmol/L 24.5 27.7 29.3*   BASE EXC ART mmol/L 1.3 4.0 5.1   O2 CONTENT ART mL/dL 11.7* 12.9* 11.1*   O2 HGB, ARTERIAL % 86.3* 94.1 94.0   ABG SOURCE  Radial, Right  --   --    NON VENT TYPE HFNC  HFNC Flow  --   --    HFNC FLOW LPM  40  --   --                      Results from " last 7 days   Lab Units 03/04/24  0605   D-DIMER QUANTITATIVE ug/ml FEU 1.47*                 Results from last 7 days   Lab Units 03/03/24  1234   LACTIC ACID mmol/L 1.3                 Results from last 7 days   Lab Units 03/03/24  0448   FERRITIN ng/mL 974*   IRON ug/dL <10*   TIBC ug/dL <173*         Results from last 7 days   Lab Units 03/07/24  0555 03/06/24  0733 03/01/24  0555   UNIT PRODUCT CODE  [id number]  [id number]   UNIT NUMBER  [id number]  [id number]   UNITABO  A A  A A   UNITRH  NEG NEG  NEG NEG   CROSSMATCH  Compatible Compatible  Compatible Compatible   UNIT DISPENSE STATUS  Presumed Trans Crossmatched  Crossmatched Presumed Trans   UNIT PRODUCT VOL mL 350 350  350 350             Results from last 7 days   Lab Units 03/07/24  0435 03/06/24  0441 03/05/24  0458 03/04/24  0605 03/02/24  0006   CRP mg/L 109.6* 172.3* 97.3* 215.2* 46.0*             Results from last 7 days   Lab Units 03/04/24  1809   CLARITY UA  Clear   COLOR UA  Colorless   SPEC GRAV UA  1.028   PH UA  8.0   GLUCOSE UA mg/dl Negative   KETONES UA mg/dl Negative   BLOOD UA  Negative   PROTEIN UA mg/dl Negative   NITRITE UA  Negative   BILIRUBIN UA  Negative   UROBILINOGEN UA (BE) mg/dl <2.0   LEUKOCYTES UA  Negative   WBC UA /hpf 4-10*   RBC UA /hpf 1-2   BACTERIA UA /hpf None Seen   EPITHELIAL CELLS WET PREP /hpf None Seen     Results from last 7 days   Lab Units 03/03/24  1406   INFLUENZA A PCR  Negative   INFLUENZA B PCR  Negative   RSV PCR  Negative                 Results from last 7 days   Lab Units 03/06/24  0441 03/05/24  0458   VANCOMYCIN RM ug/mL 29.1* 15.7       Medications:   Scheduled Medications:  cefepime, 2,000 mg, Intravenous, Q8H  chlorhexidine, 15 mL, Mouth/Throat, Q12H SARTHAK  enoxaparin, 40 mg, Subcutaneous, Q24H SARTHAK  famotidine, 20 mg, Per NG Tube, BID  ipratropium, 0.5 mg, Nebulization, TID  lamoTRIgine, 150 mg, Oral, BID  levalbuterol, 1.25 mg,  Nebulization, TID  methylPREDNISolone sodium succinate, 60 mg, Intravenous, Q8H SARTHAK  nirmatrelvir & ritonavir, 3 tablet, Oral, BID  nystatin, , Topical, BID  oxymetazoline, 2 spray, Each Nare, BID  polyethylene glycol, 17 g, Per NG Tube, Daily  remdesivir, 100 mg, Intravenous, Q24H  senna, 1 tablet, Per NG Tube, BID  sodium chloride, 2 spray, Each Nare, Q4H  topiramate, 100 mg, Per PEG Tube, BID  vancomycin, 1,000 mg, Intravenous, Q12H      Continuous IV Infusions:  insulin regular (HumuLIN R,NovoLIN R) 1 Units/mL in sodium chloride 0.9 % 100 mL infusion, 0.3-21 Units/hr, Intravenous, Titrated      PRN Meds:  acetaminophen, 650 mg, Oral, Q6H PRN  hydrALAZINE, 10 mg, Intravenous, Q6H PRN  metoclopramide, 5 mg, Intravenous, Q6H PRN  OLANZapine, 10 mg, Oral, HS PRN  ondansetron, 4 mg, Intravenous, Q6H PRN  oxyCODONE, 2.5 mg, Oral, Q4H PRN   Or  oxyCODONE, 5 mg, Oral, Q4H PRN  sodium chloride, 1 Application, Nasal, Q1H PRN        Discharge Plan: D    Network Utilization Review Department  ATTENTION: Please call with any questions or concerns to [phone] and carefully listen to the prompts so that you are directed to the right person. All voicemails are confidential.   For Discharge needs, contact Care Management DC Support Team at [phone] opt. [phone]  Send all requests for admission clinical reviews, approved or denied determinations and any other requests to dedicated fax number below belonging to the campus where the patient is receiving treatment. List of dedicated fax numbers for the Facilities:  FACILITY NAME UR FAX NUMBER   ADMISSION DENIALS (Administrative/Medical Necessity) [phone]   DISCHARGE SUPPORT TEAM (NETWORK) [phone]   PARENT CHILD HEALTH (Maternity/NICU/Pediatrics) [phone]   [facility] [phone]   [facility] [phone]   [facility] [phone]   [facility] [phone]    [facility] [phone]   [facility] [phone]   [facility] [phone]   [facility] [phone]   [facility] [phone]   [facility] [phone]   [facility] [phone]   [facility] [phone]

## 2024-03-07 NOTE — PLAN OF CARE
Problem: OCCUPATIONAL THERAPY ADULT  Goal: Performs self-care activities at highest level of function for planned discharge setting.  See evaluation for individualized goals.  Description: Treatment Interventions: ADL retraining, Functional transfer training, UE strengthening/ROM, Endurance training, Patient/family training, Equipment evaluation/education, Compensatory technique education, Continued evaluation, Energy conservation, Activityengagement          See flowsheet documentation for full assessment, interventions and recommendations.   Outcome: Progressing  Note: Limitation: Decreased ADL status, Decreased UE ROM, Decreased UE strength, Decreased Safe judgement during ADL, Decreased cognition, Decreased endurance, Decreased self-care trans, Decreased high-level ADLs, Non-func R UE, Non-func L UE  Prognosis: Fair, Poor  Assessment: Patient participated in Skilled OT session this date with interventions consisting of ADL re training with the use of correct body mechnaics, Energy Conservation techniques, safety awareness and fall prevention techniques,  therapeutic activities to: increase activity tolerance, increase dynamic sit/ stand balance during functional activity , and increase OOB/ sitting tolerance .Upon arrival patient was found supine in bed. Pt demonstrated the following tasks: MAX A x 2 sup <> sit, pt able to tolerate ~ 10 min EOB w/ O2 stable, does get tachy with increased time. Pt performs grooming with MOD A, DEP for LBD. Patient continues to be functioning below baseline level, occupational performance remains limited secondary to factors listed above and increased risk for falls and injury.   From OT standpoint, recommendation at time of d/c would be STR.  Patient to benefit from continued Occupational Therapy treatment while in the hospital to address deficits as defined above and maximize level of functional independence with ADLs and functional mobility. Pt was left after session with all  current needs met. The patient's raw score on the AM-PAC Daily Activity Inpatient Short Form is 8. A raw score of less than 19 suggests the patient may benefit from discharge to post-acute rehabilitation services. Please refer to the recommendation of the Occupational Therapist for safe discharge planning.     Rehab Resource Intensity Level, OT: II (Moderate Resource Intensity)

## 2024-03-07 NOTE — PLAN OF CARE
Problem: Prexisting or High Potential for Compromised Skin Integrity  Goal: Skin integrity is maintained or improved  Description: INTERVENTIONS:  - Identify patients at risk for skin breakdown  - Assess and monitor skin integrity  - Assess and monitor nutrition and hydration status  - Monitor labs   - Assess for incontinence   - Turn and reposition patient  - Assist with mobility/ambulation  - Relieve pressure over bony prominences  - Avoid friction and shearing  - Provide appropriate hygiene as needed including keeping skin clean and dry  - Evaluate need for skin moisturizer/barrier cream  - Collaborate with interdisciplinary team   - Patient/family teaching  - Consider wound care consult   Outcome: Progressing     Problem: INFECTION - ADULT  Goal: Absence or prevention of progression during hospitalization  Description: INTERVENTIONS:  - Assess and monitor for signs and symptoms of infection  - Monitor lab/diagnostic results  - Monitor all insertion sites, i.e. indwelling lines, tubes, and drains  - Monitor endotracheal if appropriate and nasal secretions for changes in amount and color  - [place] appropriate cooling/warming therapies per order  - Administer medications as ordered  - Instruct and encourage patient and family to use good hand hygiene technique  - Identify and instruct in appropriate isolation precautions for identified infection/condition  Outcome: Progressing     Problem: SAFETY ADULT  Goal: Patient will remain free of falls  Description: INTERVENTIONS:  - Educate patient/family on patient safety including physical limitations  - Instruct patient to call for assistance with activity   - Consult OT/PT to assist with strengthening/mobility   - Keep Call bell within reach  - Keep bed low and locked with side rails adjusted as appropriate  - Keep care items and personal belongings within reach  - Initiate and maintain comfort rounds  - Make Fall Risk Sign visible to staff  - Offer Toileting every  2 Hours, in advance of need  - Initiate/Maintain bed alarm  - Obtain necessary fall risk management equipment: non skid footwear  - Apply yellow socks and bracelet for high fall risk patients  - Consider moving patient to room near nurses station  Outcome: Progressing     Problem: RESPIRATORY - ADULT  Goal: Achieves optimal ventilation and oxygenation  Description: INTERVENTIONS:  - Assess for changes in respiratory status  - Assess for changes in mentation and behavior  - Position to facilitate oxygenation and minimize respiratory effort  - Oxygen administered by appropriate delivery if ordered  - Initiate smoking cessation education as indicated  - Encourage broncho-pulmonary hygiene including cough, deep breathe, Incentive Spirometry  - Assess the need for suctioning and aspirate as needed  - Assess and instruct to report SOB or any respiratory difficulty  - Respiratory Therapy support as indicated  Outcome: Progressing     Problem: SKIN/TISSUE INTEGRITY - ADULT  Goal: Skin Integrity remains intact(Skin Breakdown Prevention)  Description: Assess:  -Perform Flavio assessment every shift   -Clean and moisturize skin every day   -Inspect skin when repositioning, toileting, and assisting with ADLS  -Assess under medical devices such as ronny  every hour   -Assess extremities for adequate circulation and sensation     Bed Management:  -Have minimal linens on bed & keep smooth, unwrinkled  -Change linens as needed when moist or perspiring  -Avoid sitting or lying in one position for more than 2 hours while in bed  -Keep HOB at 45 degrees     Toileting:  -Offer bedside commode  -Assess for incontinence every hour  -Use incontinent care products after each incontinent episode such as moisture barrier cream     Activity:  -Mobilize patient 3 times a day  -Encourage activity and walks on unit  -Encourage or provide ROM exercises   -Turn and reposition patient every 2 Hours  -Use appropriate equipment to lift or move  patient in bed  -Instruct/ Assist with weight shifting every hour when out of bed in chair  -Consider limitation of chair time 2 hour intervals    Skin Care:  -Avoid use of baby powder, tape, friction and shearing, hot water or constrictive clothing  -Relieve pressure over bony prominences using allevyn   -Do not massage red bony areas    Next Steps:  -Teach patient strategies to minimize risks such as weight shifting    -Consider consults to  interdisciplinary teams such as PT  Outcome: Progressing     Problem: Nutrition/Hydration-ADULT  Goal: Nutrient/Hydration intake appropriate for improving, restoring or maintaining nutritional needs  Description: Monitor and assess patient's nutrition/hydration status for malnutrition. Collaborate with interdisciplinary team and initiate plan and interventions as ordered.  Monitor patient's weight and dietary intake as ordered or per policy. Utilize nutrition screening tool and intervene as necessary. Determine patient's food preferences and provide high-protein, high-caloric foods as appropriate.     INTERVENTIONS:  - Monitor oral intake, urinary output, labs, and treatment plans  - Assess nutrition and hydration status and recommend course of action  - Evaluate amount of meals eaten  - Assist patient with eating if necessary   - Allow adequate time for meals  - Recommend/ encourage appropriate diets, oral nutritional supplements, and vitamin/mineral supplements  - Order, calculate, and assess calorie counts as needed  - Recommend, monitor, and adjust tube feedings and TPN/PPN based on assessed needs  - Assess need for intravenous fluids  - Provide specific nutrition/hydration education as appropriate  - Include patient/family/caregiver in decisions related to nutrition  Outcome: Progressing     Problem: SAFETY,RESTRAINT: NV/NON-SELF DESTRUCTIVE BEHAVIOR  Goal: Remains free of harm/injury (restraint for non violent/non self-detsructive behavior)  Description:  INTERVENTIONS:  - Instruct patient/family regarding restraint use   - Assess and monitor physiologic and psychological status   - Provide interventions and comfort measures to meet assessed patient needs   - Identify and implement measures to help patient regain control  - Assess readiness for release of restraint   Outcome: Progressing     Problem: NEUROSENSORY - ADULT  Goal: Achieves stable or improved neurological status  Description: INTERVENTIONS  - Monitor and report changes in neurological status  - Monitor vital signs such as temperature, blood pressure, glucose, and any other labs ordered   - Initiate measures to prevent increased intracranial pressure  - Monitor for seizure activity and implement precautions if appropriate      Outcome: Progressing     Problem: GASTROINTESTINAL - ADULT  Goal: Minimal or absence of nausea and/or vomiting  Description: INTERVENTIONS:  - Administer IV fluids if ordered to ensure adequate hydration  - Maintain NPO status until nausea and vomiting are resolved  - Nasogastric tube if ordered  - Administer ordered antiemetic medications as needed  - Provide nonpharmacologic comfort measures as appropriate  - Advance diet as tolerated, if ordered  - Consider nutrition services referral to assist patient with adequate nutrition and appropriate food choices  Outcome: Progressing

## 2024-03-07 NOTE — PROGRESS NOTES
Progress Note - Infectious Disease   Carla Hunt 59 y.o. female MRN: [id number]  [facility]: [id number]      Impression/Plan:    1. SIRS  versus sepsis.  Fluctuating fever, WBC.  Fevers may be multifactorial due to COVID (still positive antigen and PCR) versus inflammation (seem to correlate to steroid dosing).  Also consider developing bacterial infection.  CRP is elevated today and steroid dosing was increased.  Recent blood cultures 2/26 negative.  CT C/A/P shows cellulitic and subcutaneous gas around the ostomy site and end ileostomy is dusky, suspect this is more due to ischemic changes than true infection.  CT chest also shows ongoing changes of pneumonitis with possible developing bacterial pneumonia in the lateral right middle lobe.  Fever curve overall improved, white blood count normalized but the patient remains on high flow nasal cannula and has persistent encephalopathy.  CT head with sinusitis but no other acute changes on imaging.  -Continue steroids, antivirals for COVID as below  -continue IV vancomycin dosing by pharmacy and cefepime 2 g every 8 hours for possible infection on the ostomy versus developing pneumonia. Complete 7 days of therapy as long as patient clinically improving, through 3/10/24  -Monitor abdominal exam  -Surgery is following due to ongoing ischemic changes at the ileostomy site  -Repeat CBC, CRP tomorrow to monitor treatment response  -Monitor fever curve, hemodynamics     2. Recent bacterial pneumonia.  The patient has completed multiple treatment courses over the hospitalization.  Most recent BAL culture with Pseudomonas and stenotrophomonas.  Unclear if pathogens or colonizers.  Status post 10 days levofloxacin.  CT C/A/P showed evolving changes representing combination of evolving pneumonitis secondary to viral COVID infection, improving bacterial infection in the posterior lower lung zones, developing consolidation in the lateral aspect of the  right middle lobe concerning for bacterial infection.  Patient currently on high flow nasal cannula   -continue IV cefepime and vancomycin as above   -If worsening respiratory status, switch IV cefepime to IV meropenem   -Wean O2 as able     3. Severe COVID, present on admission.  Patient was treated with a 10-day course of remdesivir, dexamethasone and was given 1 dose of Tocilizumab on admission.  COVID antigen was negative prior to coming off isolation.  Had positive COVID PCR from BAL 2/16, status post another 5-day course of remdesivir.  Patient has remained on high-dose systemic corticosteroid throughout her hospitalization which were recently intensified 2/26 for fevers.  Patient having persistent fevers, hypoxia.  COVID antigen positive and PCR is also positive 3/3 and Ct 26.8, consistent with high viral replication  -Continue remdesivir/Paxlovid per persistent COVID guidelines, continue for at least 10 days through 3/11/2024.  -Resend COVID PCR 3/11/2024.  If cycle threshold remains below 30, will extend therapy to complete a total of 14 days     4. Acute hypoxic respiratory failure, likely multifactorial, with both COVID and bacterial pneumonia contributing.  Also consider persistent inflammation, fibrosis as seems quite steroid responsive.  Most recently extubated 3/1.  On HFNC  -Antivirals as above  -steroids per critical care     5. 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  Patient is status post exploratory laparotomy with ileocecectomy and end ileostomy creation 2/20.  CT now concerning for cellulitic changes around the ostomy.  End ileostomy is with ongoing ischemic changes which is likely contributing to the imaging findings and ongoing SIRS response  -Surgery follow-up  -Antibiotics as above     B-cell lymphoma, on maintenance rituxan, which is currently postponed.  Rituximab is a risk factor for persistent COVID infection.    Above management plan to continue antivirals and  antibiotics discussed with the critical care risident who is in agreement. Discussed with patient's  at bedside. ID will follow.    Antibiotics:  Day 6 Paxlovid/remdesivir  Day 4 vancomycin/Cefepime    Subjective:  Patient remains on high flow nasal cannula.  She is more awake today and her  states that she sat at the bedside with PT.  Tmax 100.6.  Leukocytosis has resolved.  The patient was not in any pain on my evaluation.    Objective:  Vitals:  Temp:  [97.9 °F (36.6 °C)-100.6 °F (38.1 °C)] 100.6 °F (38.1 °C)  HR:  [] 112  Resp:  [16-29] 26  BP: (126-176)/(63-97) 143/83  SpO2:  [89 %-100 %] 95 %  Temp (24hrs), Av.5 °F (37.5 °C), Min:97.9 °F (36.6 °C), Max:100.6 °F (38.1 °C)  Current: Temperature: (!) 100.6 °F (38.1 °C)    Physical Exam:   General Appearance:  Ill-appearing, lethargic   Throat: Oropharynx moist without lesions.    Lungs:   Rhonchi bilaterally   Heart:  RRR; no murmur, rub or gallop   Abdomen:   Ostomy present with melena      Extremities: Tenderness of the left knee joint without effusion or erythema present   Skin: No new rashes or lesions.        Labs:   All pertinent labs and imaging studies were personally reviewed  Results from last 7 days   Lab Units 24  0435 24  1108 24  04424  0458   WBC Thousand/uL 7.75  --  7.51 14.78*   HEMOGLOBIN g/dL 8.1* 8.8* 6.5* 7.5*   PLATELETS Thousands/uL 250  --  238 323     Results from last 7 days   Lab Units 24  0435 24  0441 24  1619 24  1828 24  0605 24  2027 24  0448 24  1832 24  0510   SODIUM mmol/L 150* 147 152*   < > 159*   < > 150*   < > 148*   POTASSIUM mmol/L 2.7* 3.3* 4.0   < > 3.6   < > 3.4*   < > 3.7   CHLORIDE mmol/L 112* 111* 121*   < > 123*   < > 112*   < > 108   CO2 mmol/L 27 26 24   < > 28   < > 32   < > 29   BUN mg/dL 34* 31* 28*   < > 34*   < > 27*   < > 28*   CREATININE mg/dL 0.48* 0.63 0.63   < > 0.54*   < > 0.53*   < > 0.51*   EGFR  ml/min/1.73sq m 108 99 99   < > 104   < > 104   < > 106   CALCIUM mg/dL 8.6 9.2 9.3   < > 10.1   < > 9.8   < > 10.1   AST U/L  --   --   --   --  8*  --  11*  --  20   ALT U/L  --   --   --   --  17  --  19  --  27   ALK PHOS U/L  --   --   --   --  61  --  60  --  66    < > = values in this interval not displayed.         Results from last 7 days   Lab Units 03/07/24  0435 03/06/24  0441 03/05/24  0458 03/04/24  0605 03/02/24  0006   CRP mg/L 109.6* 172.3* 97.3* 215.2* 46.0*     Results from last 7 days   Lab Units 03/03/24  0448   FERRITIN ng/mL 974*     Results from last 7 days   Lab Units 03/04/24  0605   D-DIMER QUANTITATIVE ug/ml FEU 1.47*         Imaging:  CT chest, abdomen, pelvis personally reviewed and shows increased groundglass opacities throughout the lungs, edema and subcutaneous gas within the anterior abdominal wall fat surrounding the right lower quadrant ostomy site

## 2024-03-07 NOTE — PLAN OF CARE
Problem: Prexisting or High Potential for Compromised Skin Integrity  Goal: Skin integrity is maintained or improved  Description: INTERVENTIONS:  - Identify patients at risk for skin breakdown  - Assess and monitor skin integrity  - Assess and monitor nutrition and hydration status  - Monitor labs   - Assess for incontinence   - Turn and reposition patient  - Assist with mobility/ambulation  - Relieve pressure over bony prominences  - Avoid friction and shearing  - Provide appropriate hygiene as needed including keeping skin clean and dry  - Evaluate need for skin moisturizer/barrier cream  - Collaborate with interdisciplinary team   - Patient/family teaching  - Consider wound care consult   Outcome: Progressing     Problem: INFECTION - ADULT  Goal: Absence or prevention of progression during hospitalization  Description: INTERVENTIONS:  - Assess and monitor for signs and symptoms of infection  - Monitor lab/diagnostic results  - Monitor all insertion sites, i.e. indwelling lines, tubes, and drains  - Monitor endotracheal if appropriate and nasal secretions for changes in amount and color  - [place] appropriate cooling/warming therapies per order  - Administer medications as ordered  - Instruct and encourage patient and family to use good hand hygiene technique  - Identify and instruct in appropriate isolation precautions for identified infection/condition  Outcome: Progressing     Problem: SAFETY ADULT  Goal: Patient will remain free of falls  Description: INTERVENTIONS:  - Educate patient/family on patient safety including physical limitations  - Instruct patient to call for assistance with activity   - Consult OT/PT to assist with strengthening/mobility   - Keep Call bell within reach  - Keep bed low and locked with side rails adjusted as appropriate  - Keep care items and personal belongings within reach  - Initiate and maintain comfort rounds  - Make Fall Risk Sign visible to staff  - Offer Toileting every  2 Hours, in advance of need  - Initiate/Maintain bed alarm  - Obtain necessary fall risk management equipment: non skid footwear  - Apply yellow socks and bracelet for high fall risk patients  - Consider moving patient to room near nurses station  Outcome: Progressing     Problem: RESPIRATORY - ADULT  Goal: Achieves optimal ventilation and oxygenation  Description: INTERVENTIONS:  - Assess for changes in respiratory status  - Assess for changes in mentation and behavior  - Position to facilitate oxygenation and minimize respiratory effort  - Oxygen administered by appropriate delivery if ordered  - Initiate smoking cessation education as indicated  - Encourage broncho-pulmonary hygiene including cough, deep breathe, Incentive Spirometry  - Assess the need for suctioning and aspirate as needed  - Assess and instruct to report SOB or any respiratory difficulty  - Respiratory Therapy support as indicated  Outcome: Progressing     Problem: SKIN/TISSUE INTEGRITY - ADULT  Goal: Skin Integrity remains intact(Skin Breakdown Prevention)  Description: Assess:  -Perform Flavio assessment every shift   -Clean and moisturize skin every day   -Inspect skin when repositioning, toileting, and assisting with ADLS  -Assess under medical devices such as ronny  every hour   -Assess extremities for adequate circulation and sensation     Bed Management:  -Have minimal linens on bed & keep smooth, unwrinkled  -Change linens as needed when moist or perspiring  -Avoid sitting or lying in one position for more than 2 hours while in bed  -Keep HOB at 45 degrees     Toileting:  -Offer bedside commode  -Assess for incontinence every hour  -Use incontinent care products after each incontinent episode such as moisture barrier cream     Activity:  -Mobilize patient 3 times a day  -Encourage activity and walks on unit  -Encourage or provide ROM exercises   -Turn and reposition patient every 2 Hours  -Use appropriate equipment to lift or move  patient in bed  -Instruct/ Assist with weight shifting every hour when out of bed in chair  -Consider limitation of chair time 2 hour intervals    Skin Care:  -Avoid use of baby powder, tape, friction and shearing, hot water or constrictive clothing  -Relieve pressure over bony prominences using allevyn   -Do not massage red bony areas    Next Steps:  -Teach patient strategies to minimize risks such as weight shifting    -Consider consults to  interdisciplinary teams such as PT  Outcome: Progressing     Problem: Potential for Falls  Goal: Patient will remain free of falls  Description: INTERVENTIONS:  - Educate patient/family on patient safety including physical limitations  - Instruct patient to call for assistance with activity   - Consult OT/PT to assist with strengthening/mobility   - Keep Call bell within reach  - Keep bed low and locked with side rails adjusted as appropriate  - Keep care items and personal belongings within reach  - Initiate and maintain comfort rounds  - Make Fall Risk Sign visible to staff  - Offer Toileting every 2 Hours, in advance of need  - Initiate/Maintain bed alarm  - Obtain necessary fall risk management equipment: non skid footwear  - Apply yellow socks and bracelet for high fall risk patients  - Consider moving patient to room near nurses station  Outcome: Progressing     Problem: Nutrition/Hydration-ADULT  Goal: Nutrient/Hydration intake appropriate for improving, restoring or maintaining nutritional needs  Description: Monitor and assess patient's nutrition/hydration status for malnutrition. Collaborate with interdisciplinary team and initiate plan and interventions as ordered.  Monitor patient's weight and dietary intake as ordered or per policy. Utilize nutrition screening tool and intervene as necessary. Determine patient's food preferences and provide high-protein, high-caloric foods as appropriate.     INTERVENTIONS:  - Monitor oral intake, urinary output, labs, and  treatment plans  - Assess nutrition and hydration status and recommend course of action  - Evaluate amount of meals eaten  - Assist patient with eating if necessary   - Allow adequate time for meals  - Recommend/ encourage appropriate diets, oral nutritional supplements, and vitamin/mineral supplements  - Order, calculate, and assess calorie counts as needed  - Recommend, monitor, and adjust tube feedings and TPN/PPN based on assessed needs  - Assess need for intravenous fluids  - Provide specific nutrition/hydration education as appropriate  - Include patient/family/caregiver in decisions related to nutrition  Outcome: Progressing     Problem: SAFETY,RESTRAINT: NV/NON-SELF DESTRUCTIVE BEHAVIOR  Goal: Remains free of harm/injury (restraint for non violent/non self-detsructive behavior)  Description: INTERVENTIONS:  - Instruct patient/family regarding restraint use   - Assess and monitor physiologic and psychological status   - Provide interventions and comfort measures to meet assessed patient needs   - Identify and implement measures to help patient regain control  - Assess readiness for release of restraint   Outcome: Progressing     Problem: GASTROINTESTINAL - ADULT  Goal: Maintains adequate nutritional intake  Description: INTERVENTIONS:  - Monitor percentage of each meal consumed  - Identify factors contributing to decreased intake, treat as appropriate  - Assist with meals as needed  - Monitor I&O, weight, and lab values if indicated  - Obtain nutrition services referral as needed  Outcome: Progressing     Problem: GASTROINTESTINAL - ADULT  Goal: Establish and maintain optimal ostomy function  Description: INTERVENTIONS:  - Assess bowel function  - Encourage oral fluids to ensure adequate hydration  - Administer IV fluids if ordered to ensure adequate hydration   - Administer ordered medications as needed  - Encourage mobilization and activity  - Nutrition services referral to assist patient with appropriate  food choices  - Assess stoma site  - Consider wound care consult   Outcome: Progressing     Problem: METABOLIC, FLUID AND ELECTROLYTES - ADULT  Goal: Electrolytes maintained within normal limits  Description: INTERVENTIONS:  - Monitor labs and assess patient for signs and symptoms of electrolyte imbalances  - Administer electrolyte replacement as ordered  - Monitor response to electrolyte replacements, including repeat lab results as appropriate  - Instruct patient on fluid and nutrition as appropriate  Outcome: Progressing     Problem: HEMATOLOGIC - ADULT  Goal: Maintains hematologic stability  Description: INTERVENTIONS  - Assess for signs and symptoms of bleeding or hemorrhage  - Monitor labs  - Administer supportive blood products/factors as ordered and appropriate  Outcome: Progressing     Problem: MUSCULOSKELETAL - ADULT  Goal: Maintain or return mobility to safest level of function  Description: INTERVENTIONS:  - Assess patient's ability to carry out ADLs; assess patient's baseline for ADL function and identify physical deficits which impact ability to perform ADLs (bathing, care of mouth/teeth, toileting, grooming, dressing, etc.)  - Assess/evaluate cause of self-care deficits   - Assess range of motion  - Assess patient's mobility  - Assess patient's need for assistive devices and provide as appropriate  - Encourage maximum independence but intervene and supervise when necessary  - Involve family in performance of ADLs  - Assess for home care needs following discharge   - Consider OT consult to assist with ADL evaluation and planning for discharge  - Provide patient education as appropriate  Outcome: Progressing

## 2024-03-07 NOTE — PROGRESS NOTES
[facility]  Progress Note: Critical Care  Name: Carla Hunt 59 y.o. 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  Anemia requiring transfusion  Hypernatremia, hypokalemia  B-cell lymphoma with history of treatment with Rituxan  Minimal SAH with no neurosurgical intervention indicated at the time  Seizure disorder s/p left temporal lobe partial resection  Steroid-induced hyperglycemia  Sacral ulcers bilaterally     Neuro:   Sedation: Off sedation  -Monitor for withdrawal in context of patients history of seizures     Diagnosis: Analgesia  -On oxycodone 2.5 mg and 5 mg every 4 hours PRN     Diagnosis: seizure disorder  -S/p partial left temporal lobe resection in 2007  -On Lamictal 150 bid and Topamax 100 bid  -Last lamotrigine level from 03/02 at 10.7 (therapeutic)     Diagnosis: Anxiety  -Venlafaxine on hold for now due to changes in cognition/mentation currently, can be restarted once patient clinically improved     CV:   Diagnosis: Distributive shock, resolved  -Off pressors  -Maintain MAPs > 65 mmHg     Pulm:  Diagnosis: Acute hypoxic respiratory failure due to severe covid  and covid induced fibrosis  -Completed severe COVID pathway and 7 days CTX initially. Then completed 5 day course of remdesevir in late February for Covid19 positive PCR from bronchoscopic cultures. Now covid positive with antigen testing  -S/p Bronch 2/2,  02/16, 02/21  -PCP and AFB negative   -Legionella, viral, fungal cultures no growth to date  -Pulse dose steroids with solumedrol 1g daily x3 days (completed 2/7) then transitioned to prednisone 80mg daily on 2/8. Also completed veletri treatment and 5 day course of IVIG.   -CT scan 2/24: general improvement of areas of consolidation and some areas of groundglass opacification on the lungs, still with significant groundglass opacification especially in the lower lobes. Bilateral consolidations in lower lobes. No evidence of PE.  -Extubated 03/01. Was on bipap  -Currently on solumedrol  70mg Q8H, wean to 60 mg Q8H today  -Planning for CRP daily  -CRP trend  215.2 (03/04) > 97.3 (03/05) > 172.3 (03/06) > 109.6 (03/07)  -Was on bipap then switched to HFNC 40L 45%FiO2 this morning     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  -Monitor output of ostomy pouch and Hemoglobin  -Stoma appearing slightly retracted, still having some output  -Surgery following, will keep them updated if any further changes  -Monitor ostomy output  -Tube feeds running     -On famotidine 20 mg QD for GI prophylaxis and to reduce risk of upper GI bleed in this patient on significant amount of steroid regimen     :   Diagnosis: CKD III  -Baseline Cr appears to be 0.8-1.2  -UA unremarkable   -Upon chart review 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) though unclear how this was diagnosed, this also does not enirely fit as she is NOT hypokalemic  -Continue to monitor I/O and UOP     Diagnosis: Hypernatremia,  currently at 150  -Continue on free water flushes     Diagnosis: Acute kidney injury, sCr at 0.99, resolved     F/E/N:   F: Off IVF  E: monitor and replete for goal K>4, P>3, Mg>2  N: On tube feeds with vital 1.5 with free water flushes 350 mL every 4 hours     Heme/Onc:   Diagnosis: Anemia  Likely multifactorial in nature, acute in the setting of recent sepsis/inflammatory  state complicated by chronic anemia due to her other history of lymphoma and CKD  -Hgb at 8.1 this morning  -She did receive 1 unit transfusion on 02/29, another unit on 03/06  -Iron panel with ferritin 974, iron 10, TIBC 173     Diagnosis: Thrombocytopenia, resolved.   -Will continue to monitor  -Monitor sites for bleeding  -Platelet levels at  this morning     Diagnosis: B cell lymphoma  -Follows with heme[facility]onc at [facility]  -On rituxan for 2 year course, started May 2022  -Last dose was 12/20, treatment currently on hold   -Leukopenic on admission but WBC now wnl     DVT ppx with lovenox     Endo:   Diagnosis: steroid hyperglycemia and diabetes mellitus type 2, A1c at 6.6 from 01/2024  -Goal -180  -BG range last 24hrs 105-206  -On insulin drip     ID:   Diagnosis: Severe covid pneumonia and stenotrophomonas pneumonia  -Completed severe COVID pathway with decadron (ended 1/22) and remdesivir (ended 1/20), also completed 7 days CTX on 1/15  -C/f opportunistic infections given immunocompromised status on chemotherapy and recent steroid use  -No consolidations on CXR and procal negative  -S/p bactrim and minocycline x5 days for stenotrophomonas on sputum culture (1/25), then on bactrim for PJP ppx  -Bronc on 2/2 - Cx w/o growth (initially resulted as 1+ alpha hemolytic strep), AFB & PCP negative, f/u fungal, legionella, and viral cultures   -UA with moderate leukocytes, nitrite negative, 30-50 WBC, trace protein.  -Repeat sputum culture 2/9 with 4+ stenotrophomonas  -Bronch cultures with candida albicans, Rare gram positive cocci in pairs  -Previously completed 14 day course of antibiotics for stenotrophomonas pneumonia  -Levaquin continued for an additional 7 days since bronchial cultures with pseudomonas MDR susceptible to levaquin. Levaquin discontinued at this point since patient has likely completed >7 days of therapy for pseudomonas susceptible to antibiotics.  -Currently off of antibiotics, on remdesevir and  paxlovid for Antigen positive test for covid  -RSV/Flu/Covid panel positive, we are using that to assess the amount of covid viral particle shedding quantitatively as we continue to treat her with remdesevir and paxlovid  -On Vancomycin and cefepime to cover treatment for stoma-wall cellulitis and possible pneumonia, day 4 today. May have to consider adding anaerobic coverage  -On remdesecir and paxlovid for 14 day course     MSK/Skin:   -Wound care team following for bilateral sacral wounds       Disposition: Critical care    ICU Core Measures     A: Assess, Prevent, and Manage Pain Has pain been assessed? Yes  Need for changes to pain regimen? No   B: Both SAT/SAT  N/A   C: Choice of Sedation RASS Goal: N/A patient not on sedation  Need for changes to sedation or analgesia regimen? NA   D: Delirium CAM-ICU: Negative   E: Early Mobility  Plan for early mobility? Yes   F: Family Engagement Plan for family engagement today? Yes       Antibiotic Review: Patient on appropriate coverage based on culture data.     Review of Invasive Devices:    [place] Plan: Continue for accurate I/O monitoring for 48 hours  Central access plan: Medications requiring central line Hemodynamic monitoring      Prophylaxis:  VTE VTE covered by:  enoxaparin, Subcutaneous, 40 mg at 03/06/24 0842       Stress Ulcer  covered byfamotidine (PEPCID) tablet 20 mg [[id number]]        Significant 24hr Events     24hr events: Noted to have bloody output from ostomy bag. Hemoglobin 8.1, K 2.7. Patient remained on bipap setting 16/8 70% FiO2.      Subjective   Seen by bedside, will reach out to patients family to discuss medical updates today.     Review of Systems   Unable to perform ROS: Patient nonverbal        Objective                            Vitals I/O      Most Recent Min/Max in 24hrs   Temp 100.2 °F (37.9 °C) Temp  Min: 98.3 °F (36.8 °C)  Max: 100.6 °F (38.1 °C)   Pulse (!) 114 Pulse  Min: 87  Max: 133   Resp 21 Resp  Min: 16  Max: 33   /95  BP  Min: 129/73  Max: 176/97   O2 Sat 99 % SpO2  Min: 89 %  Max: 100 %   Bipap 16/8 70% FiO2   -Was on bipap then switched to HFNC 40L 45%FiO2 this morning   Intake/Output Summary (Last 24 hours) at 3/7/2024 0645  Last data filed at 3/7/2024 0600  Gross per 24 hour   Intake 1615.76 ml   Output 4390 ml   Net -2774.24 ml       Diet Enteral/Parenteral; Tube Feeding No Oral Diet; Vital 1.5; Continuous; 70; Prosource Protein Liquid - One Packet; 350; Water; Every 4 hours    Invasive Monitoring           Physical Exam   Physical Exam  Vitals reviewed.   Skin:     General: Skin is cool.      Capillary Refill: Capillary refill takes less than 2 seconds.      Comments: Cool to touch   HENT:      Head: Normocephalic.   Cardiovascular:      Rate and Rhythm: Normal rate and regular rhythm.      Pulses: Normal pulses.   Abdominal: General: Bowel sounds are normal.      Palpations: Abdomen is soft.      Comments: Ostomy site with dark red stool   Constitutional:       Appearance: She is ill-appearing.      Comments: Has a NG tube, Bipap mask in place, on soft mitts for restraints, marie in place. CVC on left side.    Pulmonary:      Effort: Pulmonary effort is normal.      Breath sounds: Normal breath sounds.   Neurological:      Comments: Able to move extremities spontaneously. Intermittently able to follow commands. Does wake up to verbal stimuli.             Diagnostic Studies      EKG: Reviewed  Imaging: Reviewed I have personally reviewed pertinent reports.       Medications:  Scheduled PRN   cefepime, 2,000 mg, Q8H  chlorhexidine, 15 mL, Q12H SARTHAK  enoxaparin, 40 mg, Q24H SARTHAK  famotidine, 20 mg, BID  ipratropium, 0.5 mg, TID  lamoTRIgine, 150 mg, BID  levalbuterol, 1.25 mg, TID  methylPREDNISolone sodium succinate, 70 mg, Q8H SARTHAK  nirmatrelvir & ritonavir, 3 tablet, BID  nystatin, , BID  polyethylene glycol, 17 g, Daily  potassium chloride, 40 mEq, Once  remdesivir, 100 mg, Q24H  senna, 1 tablet, BID  silver  nitrate-potassium nitrate, 1 applicator, Once  silver nitrate-potassium nitrate, 1 applicator, Once  silver nitrate-potassium nitrate, 1 applicator, Once  sodium chloride, 2 spray, Q4H  topiramate, 100 mg, BID  vancomycin, 1,500 mg, Q24H      acetaminophen, 650 mg, Q6H PRN  hydrALAZINE, 10 mg, Q6H PRN  metoclopramide, 5 mg, Q6H PRN  OLANZapine, 10 mg, HS PRN  ondansetron, 4 mg, Q6H PRN  oxyCODONE, 2.5 mg, Q4H PRN   Or  oxyCODONE, 5 mg, Q4H PRN  sodium chloride, 1 Application, Q1H PRN       Continuous    insulin regular (HumuLIN R,NovoLIN R) 1 Units/mL in sodium chloride 0.9 % 100 mL infusion, 0.3-21 Units/hr, Last Rate: 2 Units/hr (03/07/24 0627)         Labs:    CBC    Recent Labs     03/06/24  0441 03/06/24  1108 03/07/24  0435   WBC 7.51  --  7.75   HGB 6.5* 8.8* 8.1*   HCT 21.5* 28.3* 25.0*     --  250    -CRP trend  215.2 (03/04) > 97.3 (03/05) > 172.3 (03/06) > 109.6 (03/07) BMP    Recent Labs     03/06/24  0441 03/07/24  0435   SODIUM 147 150*   K 3.3* 2.7*   * 112*   CO2 26 27   AGAP 10 11   BUN 31* 34*   CREATININE 0.63 0.48*   CALCIUM 9.2 8.6    K repleted (120 given)   Coags    No recent results     Additional Electrolytes  Recent Labs     03/06/24  0441 03/06/24  1419 03/07/24  0435   MG 2.1  --  2.2   CAIONIZED  --  1.29  --           Blood Gas    Recent Labs     03/06/24  1227   PHART 7.483*   [id number] 33.4*   PO2ART 46.4*   [id number] 24.5   BEART 1.3   SOURCE Radial, Right     Recent Labs     03/06/24  1227   SOURCE Radial, Right    LFTs  No recent results    Infectious  No recent results  Glucose  Recent Labs     03/05/24  1619 03/06/24  0441 03/07/24  0435   GLUC 187* 206* 141*               Miguel Whittaker MD

## 2024-03-07 NOTE — PHYSICAL THERAPY NOTE
PHYSICAL THERAPY TREATMENT  NAME:  Carla Hunt  DATE: 24    AGE:   59 y.o.  Mrn:   [id number]  ADMIT DX:  Stroke-like symptoms [R29.90]    Past Medical History:   Diagnosis Date    Hx of hypercalcemia     Lymphoma (HCC) 2021    Parathyroid disease (HCC)     Seizures (HCC)     Situational depression     24 yr old son  from drug overdose     Past Surgical History:   Procedure Laterality Date    CRANIOTOMY FOR TEMPORAL LOBECTOMY Left     [place] LAPS ABD PRTM&OMENTUM DX W/WO SPEC BR/WA SPX N/A 2024    Procedure: LAPAROSCOPY DIAGNOSTIC,EXPLORATORY LAPAROTOMY, RIGHT KARY COLECTOMY,ILEOSTOMY, MUCUS FISTULA;  Surgeon: Lauryn Ullrich, DO;  Location: [facility];  Service: General       Length Of Stay: 57     24 1006   PT Last Visit   PT Visit Date 24   Note Type   Note Type Treatment   Education   Education Provided Yes   End of Consult   Patient Position at End of Consult Other (comment);Bed/Chair alarm activated;All needs within reach  (bed in modified chair position w/ spous present.all vitals stable)   Pain Assessment   Pain Assessment Tool FLACC   Pain Rating: FLACC (Rest) - Face 0   Pain Rating: FLACC (Rest) - Legs 0   Pain Rating: FLACC (Rest) - Activity 0   Pain Rating: FLACC (Rest) - Cry 0   Pain Rating: FLACC (Rest) - Consolability 0   Score: FLACC (Rest) 0   Pain Rating: FLACC (Activity) - Face 1   Pain Rating: FLACC (Activity) - Legs 0   Pain Rating: FLACC (Activity) - Activity 0   Pain Rating: FLACC (Activity) - Cry 1   Pain Rating: FLACC (Activity) - Consolability 0   Score: FLACC (Activity) 2   Restrictions/Precautions   Weight Bearing Precautions Per Order No   Other Precautions Contact/isolation;Airborne/isolation   General   Chart Reviewed Yes   Response to Previous Treatment Patient unable to report, no changes reported from family or staff   Family/Caregiver Present Yes  (spouse present throughout- education  provided)   Cognition   Overall Cognitive Status  Impaired   Arousal/Participation Cooperative   Attention Difficulty attending to directions   Orientation Level Oriented to person   Following Commands Follows one step commands with increased time or repetition   Comments pt more awake and alert- keeps eyes open and is able to follow commands w/ delay- fatigues easily   Subjective   Subjective pt agreeable to therex and attempt sitting EOB- is able to give thumbs up in bed   Bed Mobility   Supine to Sit 2  Maximal assistance   Additional items Assist x 2   Sit to Supine 2  Maximal assistance   Additional items Assist x 2   Additional Comments pt tolerated sitting EOB ~10 mins today w/ maxA for support and to maintain upright posture. Spo2 on HFNC remained > 90% throughout sitting HR spiked from 110to 150's at approx 10 mins and pt was assisted BTB and repositioned. HR stabilized and RN was updated.   Transfers   Sit to Stand Unable to assess  (not appropriate)   Stand to Sit Unable to assess   Ambulation/Elevation   Gait pattern Not appropriate   Balance   Static Sitting Poor   Dynamic Sitting Poor -   Endurance Deficit   Endurance Deficit Yes   Activity Tolerance   Activity Tolerance Patient limited by fatigue;Treatment limited secondary to medical complications (Comment)  (increased HR max to 152bpm)   Medical Staff Made Aware yes _ RN Nickie cleared pt for session and was updated post session.   Nurse Made Aware yes   Exercises   Heelslides Supine;10 reps;AAROM   Hip Abduction Supine;10 reps;AAROM;Right;Left   Hip Adduction Supine;10 reps;AAROM;Right;Left   Knee AROM Long Arc Quad Sitting;5 reps;AAROM;PROM;Right;Left   Heel Cord Stretch Supine;5 reps  (30 sec hold)   Balance training  seated balance EOB support on HFNC   Assessment   Prognosis Fair   Problem List Decreased strength;Decreased range of motion;Decreased endurance;Impaired balance;Decreased mobility;Decreased cognition;Decreased coordination;Impaired judgement;Decreased safety awareness;Decreased skin  integrity;Pain   Assessment pt seen for PT tx session for AAROM/ therex; stretching and seated balance EOB. In comparison to prior session pt was able to tolerate sitting x 10 mins w/ maxA w/ stable Spo2- did fatiuge and HR was elevated to 152 and pt required return to bed. Pt was more awake and alert; able to follow directions w/ delay. pt remains grossly weak and will continue to benefit from skilled PT to address deficits. Pt was placed w/ bed in chair position w/ UE elevated. PT provided education to spouse who was precent for gentle AAROM and to encourage short bouts of AROM as able throughout the day. Spouse/ pt receptive and pleased w/ todays session and progress from prior.   Goals   Patient Goals none stated   LTG Expiration Date 03/19/24   PT Treatment Day 1   Plan   Treatment/Interventions ADL retraining;Functional transfer training;LE strengthening/ROM;Therapeutic exercise;Endurance training;Cognitive reorientation;Patient/family training;Equipment eval/education;Bed mobility;Compensatory technique education;Continued evaluation;Spoke to nursing;Spoke to case management;Spoke to advanced practitioner;OT;Family   Progress Slow progress, decreased activity tolerance   PT Frequency 2-3x/wk   Discharge Recommendation   Rehab Resource Intensity Level, PT I (Maximum Resource Intensity)   Equipment Recommended   (TBD w/ progress)   AM-PAC Basic Mobility Inpatient   Turning in Flat Bed Without Bedrails 2   Lying on Back to Sitting on Edge of Flat Bed Without Bedrails 2   Moving Bed to Chair 1   Standing Up From Chair Using Arms 1   Walk in Room 1   Climb 3-5 Stairs With Railing 1   Basic Mobility Inpatient Raw Score 8   Turning Head Towards Sound 3   Follow Simple Instructions 2   Low Function Basic Mobility Raw Score  13   Low Function Basic Mobility Standardized Score  20.14   Highest Level Of Mobility   JH-HLM Goal 3: Sit at edge of bed   JH-HLM Achieved 3: Sit at edge of bed     The patient's AM-PAC Basic  Mobility Inpatient Short Form Raw Score is 8. A Raw score of less than or equal to 16 suggests the patient may benefit from discharge to post-acute rehabilitation services. Please also refer to the recommendation of the Physical Therapist for safe discharge planning.            Moon Ventura, PT

## 2024-03-07 NOTE — PROGRESS NOTES
Bloody stool noted in ileostomy bag during early morning, general surgery made aware.     1425- Large amount of bloody drainage noted, Charles enamorado notified. Bag emptied, ileostomy cleaned and pressure held on site to control bleeding. General surgery contacted by Dr. Enamorado, evaluated patient and site, found source of small bleed, controlled

## 2024-03-07 NOTE — PLAN OF CARE
Problem: Prexisting or High Potential for Compromised Skin Integrity  Goal: Skin integrity is maintained or improved  Description: INTERVENTIONS:  - Identify patients at risk for skin breakdown  - Assess and monitor skin integrity  - Assess and monitor nutrition and hydration status  - Monitor labs   - Assess for incontinence   - Turn and reposition patient  - Assist with mobility/ambulation  - Relieve pressure over bony prominences  - Avoid friction and shearing  - Provide appropriate hygiene as needed including keeping skin clean and dry  - Evaluate need for skin moisturizer/barrier cream  - Collaborate with interdisciplinary team   - Patient/family teaching  - Consider wound care consult   Outcome: Progressing     Problem: Nutrition/Hydration-ADULT  Goal: Nutrient/Hydration intake appropriate for improving, restoring or maintaining nutritional needs  Description: Monitor and assess patient's nutrition/hydration status for malnutrition. Collaborate with interdisciplinary team and initiate plan and interventions as ordered.  Monitor patient's weight and dietary intake as ordered or per policy. Utilize nutrition screening tool and intervene as necessary. Determine patient's food preferences and provide high-protein, high-caloric foods as appropriate.     INTERVENTIONS:  - Monitor oral intake, urinary output, labs, and treatment plans  - Assess nutrition and hydration status and recommend course of action  - Evaluate amount of meals eaten  - Assist patient with eating if necessary   - Allow adequate time for meals  - Recommend/ encourage appropriate diets, oral nutritional supplements, and vitamin/mineral supplements  - Order, calculate, and assess calorie counts as needed  - Recommend, monitor, and adjust tube feedings and TPN/PPN based on assessed needs  - Assess need for intravenous fluids  - Provide specific nutrition/hydration education as appropriate  - Include patient/family/caregiver in decisions related to  nutrition  Outcome: Progressing     Problem: SAFETY,RESTRAINT: NV/NON-SELF DESTRUCTIVE BEHAVIOR  Goal: Remains free of harm/injury (restraint for non violent/non self-detsructive behavior)  Description: INTERVENTIONS:  - Instruct patient/family regarding restraint use   - Assess and monitor physiologic and psychological status   - Provide interventions and comfort measures to meet assessed patient needs   - Identify and implement measures to help patient regain control  - Assess readiness for release of restraint   Outcome: Progressing  Goal: Returns to optimal restraint-free functioning  Description: INTERVENTIONS:  - Assess the patient's behavior and symptoms that indicate continued need for restraint  - Identify and implement measures to help patient regain control  - Assess readiness for release of restraint   Outcome: Progressing

## 2024-03-07 NOTE — PROGRESS NOTES
Vancomycin IV Pharmacy-to-Dose Consultation    Carla Hunt is a 59 y.o. female who is currently receiving Vancomycin IV with management by the Pharmacy Consult service.    Relevant clinical data and objective / subjective history reviewed.      Vancomycin Assessment:  Indication: Other Ostomy infection  Status: critically ill  Micro:   - 3/5 MRSA culture: negative  - 3/3 COVID: (+)  Procalcitonin: no recent  Renal Function:     Lab Results   Component Value Date    CREATININE 0.48 (L) 03/07/2024     Estimated Creatinine Clearance: 128.9 mL/min (A) (by C-G formula based on SCr of 0.48 mg/dL (L)).  Dialysis: no  Days of Therapy: 4  Current Dose: 1500 mg IV q24h   Goal AUC / Trough: -600, trough >10   Last Level: 29.1 on 3/6  Model Fit:  good  Assessment: ID consulted. WBC improved, febrile.       Vancomycin Plan:  New Dosing: change to 1000 mg IV q12h   Predicted Trough / AUC: 14.4 / 534  Next Level: No additional levels, anticipate discontinuation on 3/10  Renal Function Monitoring: Daily BMP and UOP      Pharmacy will continue to follow closely for s/sx of nephrotoxicity, infusion reactions and appropriateness of therapy.  BMP and CBC will be ordered per protocol. We will continue to follow the patient’s culture results and clinical progress daily.       Gabe Henriquez, PharmD, BCCCP  Critical Care Clinical Pharmacist  Available via [employer]

## 2024-03-07 NOTE — OCCUPATIONAL THERAPY NOTE
Occupational Therapy Progress Note     Patient Name: Carla Hunt  Today's Date: 3/7/2024  Problem List  Principal Problem:    Acute respiratory failure with hypoxia (HCC)  Active Problems:    Anxiety state    COVID    Stage 3b chronic kidney disease (CKD) (HCC)    Teto marginal zone B-cell lymphoma (HCC)    Seizure disorder (HCC)    Hypotension    Palliative care patient    Goals of care, counseling/discussion    Acute kidney injury (HCC)    Cecal volvulus (HCC)          03/07/24 1004   OT Last Visit   OT Visit Date 03/07/24   Note Type   Note Type Treatment   Pain Assessment   Pain Assessment Tool FLACC   Pain Rating: FLACC (Rest) - Face 0   Pain Rating: FLACC (Rest) - Legs 0   Pain Rating: FLACC (Rest) - Activity 0   Pain Rating: FLACC (Rest) - Cry 0   Pain Rating: FLACC (Rest) - Consolability 0   Score: FLACC (Rest) 0   Pain Rating: FLACC (Activity) - Face 1   Pain Rating: FLACC (Activity) - Legs 0   Pain Rating: FLACC (Activity) - Activity 0   Pain Rating: FLACC (Activity) - Cry 1   Pain Rating: FLACC (Activity) - Consolability 0   Score: FLACC (Activity) 2   Restrictions/Precautions   Weight Bearing Precautions Per Order No   Other Precautions Airborne/isolation;Contact/isolation;Cognitive;Multiple lines;Telemetry;O2;Fall Risk;Restraints  (HFNC, B/L mitts, NGT)   Lifestyle   Autonomy I adls and mobility - i iadls - shares homemaking with family   Reciprocal Relationships supportive family   Service to Others volunteers   Intrinsic Gratification will continue to assess   ADL   Grooming Assistance 3  Moderate Assistance   Grooming Deficit Wash/dry face   Grooming Comments initially with hand over hand, then able to maintain position, increased A for thoroughness   LB Dressing Assistance 1  Total Assistance   LB Dressing Deficit Don/doff R sock;Don/doff L sock   Bed Mobility   Supine to Sit 2  Maximal assistance   Additional items Assist x 2;HOB elevated;Bedrails;Increased time required;LE management    Sit to Supine 2  Maximal assistance   Additional items Assist x 2;HOB elevated;Bedrails;Increased time required;LE management   Additional Comments Pt maintain EOB sitting position with MOD to MAX A for ~ 10 minutes. O2 remained sstable > 90% t/o; however, with increased time, pt HR increased + was returned to bed   Transfers   Sit to Stand Unable to assess   Stand to Sit Unable to assess   Cognition   Overall Cognitive Status Impaired   Arousal/Participation Cooperative   Attention Difficulty attending to directions   Orientation Level Oriented to person   Memory Unable to assess   Following Commands Follows one step commands with increased time or repetition   Comments Pt essentially non-verbal during session, able to indicate yes/no at times with head nodding. Increased cueing for tasks but able to participate   Activity Tolerance   Activity Tolerance Patient limited by fatigue   Medical Staff Made Aware PT MARSHA Mustafa clearance for session   Assessment   Assessment Patient participated in Skilled OT session this date with interventions consisting of ADL re training with the use of correct body mechnaics, Energy Conservation techniques, safety awareness and fall prevention techniques,  therapeutic activities to: increase activity tolerance, increase dynamic sit/ stand balance during functional activity , and increase OOB/ sitting tolerance .Upon arrival patient was found supine in bed. Pt demonstrated the following tasks: MAX A x 2 sup <> sit, pt able to tolerate ~ 10 min EOB w/ O2 stable, does get tachy with increased time. Pt performs grooming with MOD A, DEP for LBD. Patient continues to be functioning below baseline level, occupational performance remains limited secondary to factors listed above and increased risk for falls and injury.   From OT standpoint, recommendation at time of d/c would be STR.  Patient to benefit from continued Occupational Therapy treatment while in the hospital to address deficits as  defined above and maximize level of functional independence with ADLs and functional mobility. Pt was left after session with all current needs met. The patient's raw score on the AM-PAC Daily Activity Inpatient Short Form is 8. A raw score of less than 19 suggests the patient may benefit from discharge to post-acute rehabilitation services. Please refer to the recommendation of the Occupational Therapist for safe discharge planning.   Plan   Treatment Interventions ADL retraining;Functional transfer training;Cognitive reorientation;Patient/family training;Compensatory technique education;Continued evaluation;Energy conservation;Activityengagement   Goal Expiration Date 03/19/24   OT Treatment Day 8   OT Frequency 2-3x/wk   Discharge Recommendation   Rehab Resource Intensity Level, OT II (Moderate Resource Intensity)   AM-PAC Daily Activity Inpatient   Lower Body Dressing 1   Bathing 1   Toileting 1   Upper Body Dressing 2   Grooming 2   Eating 1   Daily Activity Raw Score 8   AM-PAC Applied Cognition Inpatient   Following a Speech/Presentation 2   Understanding Ordinary Conversation 3   Taking Medications 2   Remembering Where Things Are Placed or Put Away 1   Remembering List of 4-5 Errands 1   Taking Care of Complicated Tasks 1   Applied Cognition Raw Score 10   Applied Cognition Standardized Score 24.98     Tanika Gutierrez MS, OTR/L

## 2024-03-07 NOTE — QUICK NOTE
General Surgery Quick Note:    Evaluated ostomy yesterday as well as today with team. Discussed with ICU attending/resident/fellow, patient's spouse, and nurses. Ostomy dusky and currently sloughing, however it is productive and thus viable. Tissue is friable as it undergoes expected changes during this time. Patient underwent repeat scans without clinical concerns for underlying infectious process or abscesses involving the ostomy site or midline wound.     Duoderm applied overlying staple line to assist with keeping midline wound clean. Expectant management of ostomy as well as friability and bleeding. Low suspicion for GI bleed as oozing is identifiable from ostomy. Right superior portion of ostomy site with oozing. Can hold pressure as needed. Small area packed with edge of 4x4 guaze. Ostomy appliance replaced. Maintain staples to midline, will assess for removal next week.

## 2024-03-07 NOTE — PROGRESS NOTES
Nutrition Follow-Up/Recommendations:    Noted TF formula has been changed to Vital 1.5, would recommend goal rate of 50mL/hr continuous with this formula, continue one packet prosource liquid protein daily.   This will provide 1860 kcals, 96g protein, 224g CHO, 977mL water not including additional free water flushes.     Adjustments in additional free water flushes per critical care.     Continue to monitor and replete electrolytes prn.

## 2024-03-08 LAB
ABO GROUP BLD BPU: NORMAL
ABO GROUP BLD BPU: NORMAL
ANION GAP SERPL CALCULATED.3IONS-SCNC: 10 MMOL/L
ANION GAP SERPL CALCULATED.3IONS-SCNC: 13 MMOL/L
BPU ID: NORMAL
BPU ID: NORMAL
BUN SERPL-MCNC: 29 MG/DL (ref 5–25)
BUN SERPL-MCNC: 31 MG/DL (ref 5–25)
CALCIUM SERPL-MCNC: 8.3 MG/DL (ref 8.4–10.2)
CALCIUM SERPL-MCNC: 8.3 MG/DL (ref 8.4–10.2)
CHLORIDE SERPL-SCNC: 106 MMOL/L (ref 96–108)
CHLORIDE SERPL-SCNC: 107 MMOL/L (ref 96–108)
CO2 SERPL-SCNC: 24 MMOL/L (ref 21–32)
CO2 SERPL-SCNC: 27 MMOL/L (ref 21–32)
CREAT SERPL-MCNC: 0.48 MG/DL (ref 0.6–1.3)
CREAT SERPL-MCNC: 0.56 MG/DL (ref 0.6–1.3)
CROSSMATCH: NORMAL
CROSSMATCH: NORMAL
CRP SERPL QL: 36.9 MG/L
ERYTHROCYTE [DISTWIDTH] IN BLOOD BY AUTOMATED COUNT: 20 % (ref 11.6–15.1)
GFR SERPL CREATININE-BSD FRML MDRD: 103 ML/MIN/1.73SQ M
GFR SERPL CREATININE-BSD FRML MDRD: 108 ML/MIN/1.73SQ M
GLUCOSE SERPL-MCNC: 118 MG/DL (ref 65–140)
GLUCOSE SERPL-MCNC: 131 MG/DL (ref 65–140)
GLUCOSE SERPL-MCNC: 134 MG/DL (ref 65–140)
GLUCOSE SERPL-MCNC: 141 MG/DL (ref 65–140)
GLUCOSE SERPL-MCNC: 156 MG/DL (ref 65–140)
GLUCOSE SERPL-MCNC: 164 MG/DL (ref 65–140)
GLUCOSE SERPL-MCNC: 172 MG/DL (ref 65–140)
GLUCOSE SERPL-MCNC: 187 MG/DL (ref 65–140)
GLUCOSE SERPL-MCNC: 191 MG/DL (ref 65–140)
GLUCOSE SERPL-MCNC: 201 MG/DL (ref 65–140)
GLUCOSE SERPL-MCNC: 204 MG/DL (ref 65–140)
GLUCOSE SERPL-MCNC: 264 MG/DL (ref 65–140)
GLUCOSE SERPL-MCNC: 80 MG/DL (ref 65–140)
GLUCOSE SERPL-MCNC: 90 MG/DL (ref 65–140)
HCT VFR BLD AUTO: 26.7 % (ref 34.8–46.1)
HGB BLD-MCNC: 8.5 G/DL (ref 11.5–15.4)
MAGNESIUM SERPL-MCNC: 2 MG/DL (ref 1.9–2.7)
MAGNESIUM SERPL-MCNC: 2.1 MG/DL (ref 1.9–2.7)
MCH RBC QN AUTO: 29.8 PG (ref 26.8–34.3)
MCHC RBC AUTO-ENTMCNC: 31.8 G/DL (ref 31.4–37.4)
MCV RBC AUTO: 94 FL (ref 82–98)
PLATELET # BLD AUTO: 434 THOUSANDS/UL (ref 149–390)
PMV BLD AUTO: 11.9 FL (ref 8.9–12.7)
POTASSIUM SERPL-SCNC: 2.8 MMOL/L (ref 3.5–5.3)
POTASSIUM SERPL-SCNC: 3.4 MMOL/L (ref 3.5–5.3)
RBC # BLD AUTO: 2.85 MILLION/UL (ref 3.81–5.12)
SODIUM SERPL-SCNC: 143 MMOL/L (ref 135–147)
SODIUM SERPL-SCNC: 144 MMOL/L (ref 135–147)
UNIT DISPENSE STATUS: NORMAL
UNIT DISPENSE STATUS: NORMAL
UNIT PRODUCT CODE: NORMAL
UNIT PRODUCT CODE: NORMAL
UNIT PRODUCT VOLUME: 350 ML
UNIT PRODUCT VOLUME: 350 ML
UNIT RH: NORMAL
UNIT RH: NORMAL
WBC # BLD AUTO: 18.1 THOUSAND/UL (ref 4.31–10.16)

## 2024-03-08 PROCEDURE — 94760 N-INVAS EAR/PLS OXIMETRY 1: CPT

## 2024-03-08 PROCEDURE — 94640 AIRWAY INHALATION TREATMENT: CPT

## 2024-03-08 PROCEDURE — 80048 BASIC METABOLIC PNL TOTAL CA: CPT | Performed by: STUDENT IN AN ORGANIZED HEALTH CARE EDUCATION/TRAINING PROGRAM

## 2024-03-08 PROCEDURE — 80048 BASIC METABOLIC PNL TOTAL CA: CPT

## 2024-03-08 PROCEDURE — 83735 ASSAY OF MAGNESIUM: CPT | Performed by: STUDENT IN AN ORGANIZED HEALTH CARE EDUCATION/TRAINING PROGRAM

## 2024-03-08 PROCEDURE — 94660 CPAP INITIATION&MGMT: CPT

## 2024-03-08 PROCEDURE — 94664 DEMO&/EVAL PT USE INHALER: CPT

## 2024-03-08 PROCEDURE — 99232 SBSQ HOSP IP/OBS MODERATE 35: CPT | Performed by: PHYSICIAN ASSISTANT

## 2024-03-08 PROCEDURE — 85027 COMPLETE CBC AUTOMATED: CPT | Performed by: STUDENT IN AN ORGANIZED HEALTH CARE EDUCATION/TRAINING PROGRAM

## 2024-03-08 PROCEDURE — 99233 SBSQ HOSP IP/OBS HIGH 50: CPT | Performed by: INTERNAL MEDICINE

## 2024-03-08 PROCEDURE — 86140 C-REACTIVE PROTEIN: CPT | Performed by: STUDENT IN AN ORGANIZED HEALTH CARE EDUCATION/TRAINING PROGRAM

## 2024-03-08 PROCEDURE — 92610 EVALUATE SWALLOWING FUNCTION: CPT

## 2024-03-08 PROCEDURE — 94762 N-INVAS EAR/PLS OXIMTRY CONT: CPT

## 2024-03-08 PROCEDURE — 83735 ASSAY OF MAGNESIUM: CPT

## 2024-03-08 PROCEDURE — 82948 REAGENT STRIP/BLOOD GLUCOSE: CPT

## 2024-03-08 PROCEDURE — 93005 ELECTROCARDIOGRAM TRACING: CPT

## 2024-03-08 RX ORDER — METHYLPREDNISOLONE SODIUM SUCCINATE 125 MG/2ML
50 INJECTION, POWDER, LYOPHILIZED, FOR SOLUTION INTRAMUSCULAR; INTRAVENOUS EVERY 8 HOURS SCHEDULED
Status: DISCONTINUED | OUTPATIENT
Start: 2024-03-09 | End: 2024-03-08 | Stop reason: SDUPTHER

## 2024-03-08 RX ORDER — METHYLPREDNISOLONE SODIUM SUCCINATE 125 MG/2ML
50 INJECTION, POWDER, LYOPHILIZED, FOR SOLUTION INTRAMUSCULAR; INTRAVENOUS EVERY 8 HOURS SCHEDULED
Status: DISCONTINUED | OUTPATIENT
Start: 2024-03-09 | End: 2024-03-09

## 2024-03-08 RX ORDER — FUROSEMIDE 10 MG/ML
40 INJECTION INTRAMUSCULAR; INTRAVENOUS ONCE
Status: COMPLETED | OUTPATIENT
Start: 2024-03-08 | End: 2024-03-08

## 2024-03-08 RX ORDER — POTASSIUM CHLORIDE 29.8 MG/ML
40 INJECTION INTRAVENOUS ONCE
Status: COMPLETED | OUTPATIENT
Start: 2024-03-08 | End: 2024-03-08

## 2024-03-08 RX ORDER — METRONIDAZOLE 500 MG/1
500 TABLET ORAL EVERY 8 HOURS SCHEDULED
Status: DISCONTINUED | OUTPATIENT
Start: 2024-03-08 | End: 2024-03-12

## 2024-03-08 RX ORDER — VANCOMYCIN HYDROCHLORIDE 750 MG/150ML
750 INJECTION, SOLUTION INTRAVENOUS EVERY 12 HOURS
Status: DISCONTINUED | OUTPATIENT
Start: 2024-03-08 | End: 2024-03-08

## 2024-03-08 RX ORDER — ACETAMINOPHEN 325 MG/1
650 TABLET ORAL EVERY 6 HOURS PRN
Status: DISCONTINUED | OUTPATIENT
Start: 2024-03-08 | End: 2024-04-18

## 2024-03-08 RX ORDER — POTASSIUM CHLORIDE 29.8 MG/ML
40 INJECTION INTRAVENOUS ONCE
Qty: 100 ML | Refills: 0 | Status: COMPLETED | OUTPATIENT
Start: 2024-03-08 | End: 2024-03-08

## 2024-03-08 RX ORDER — FUROSEMIDE 10 MG/ML
20 INJECTION INTRAMUSCULAR; INTRAVENOUS
Status: DISCONTINUED | OUTPATIENT
Start: 2024-03-08 | End: 2024-03-09

## 2024-03-08 RX ADMIN — TOPIRAMATE 100 MG: 100 TABLET, FILM COATED ORAL at 18:33

## 2024-03-08 RX ADMIN — IPRATROPIUM BROMIDE 0.5 MG: 0.5 SOLUTION RESPIRATORY (INHALATION) at 07:40

## 2024-03-08 RX ADMIN — NIRMATRELVIR AND RITONAVIR 3 TABLET: KIT at 09:11

## 2024-03-08 RX ADMIN — CEFEPIME 2000 MG: 2 INJECTION, POWDER, FOR SOLUTION INTRAVENOUS at 15:57

## 2024-03-08 RX ADMIN — CEFEPIME 2000 MG: 2 INJECTION, POWDER, FOR SOLUTION INTRAVENOUS at 00:07

## 2024-03-08 RX ADMIN — METHYLPREDNISOLONE SODIUM SUCCINATE 60 MG: 125 INJECTION, POWDER, FOR SOLUTION INTRAMUSCULAR; INTRAVENOUS at 05:39

## 2024-03-08 RX ADMIN — ACETAMINOPHEN 650 MG: 325 TABLET, FILM COATED ORAL at 09:07

## 2024-03-08 RX ADMIN — Medication 2 SPRAY: at 09:11

## 2024-03-08 RX ADMIN — OXYMETAZOLINE HYDROCHLORIDE 2 SPRAY: 0.05 SPRAY NASAL at 20:41

## 2024-03-08 RX ADMIN — CHLORHEXIDINE GLUCONATE 0.12% ORAL RINSE 15 ML: 1.2 LIQUID ORAL at 09:07

## 2024-03-08 RX ADMIN — POTASSIUM CHLORIDE 40 MEQ: 29.8 INJECTION, SOLUTION INTRAVENOUS at 12:15

## 2024-03-08 RX ADMIN — VANCOMYCIN HYDROCHLORIDE 1000 MG: 1 INJECTION, SOLUTION INTRAVENOUS at 02:03

## 2024-03-08 RX ADMIN — CEFEPIME 2000 MG: 2 INJECTION, POWDER, FOR SOLUTION INTRAVENOUS at 09:07

## 2024-03-08 RX ADMIN — SENNOSIDES 8.6 MG: 8.6 TABLET, FILM COATED ORAL at 09:08

## 2024-03-08 RX ADMIN — OXYMETAZOLINE HYDROCHLORIDE 2 SPRAY: 0.05 SPRAY NASAL at 09:11

## 2024-03-08 RX ADMIN — LEVALBUTEROL HYDROCHLORIDE 1.25 MG: 1.25 SOLUTION RESPIRATORY (INHALATION) at 14:55

## 2024-03-08 RX ADMIN — FUROSEMIDE 20 MG: 10 INJECTION, SOLUTION INTRAMUSCULAR; INTRAVENOUS at 10:08

## 2024-03-08 RX ADMIN — LAMOTRIGINE 150 MG: 100 TABLET ORAL at 18:33

## 2024-03-08 RX ADMIN — LEVALBUTEROL HYDROCHLORIDE 1.25 MG: 1.25 SOLUTION RESPIRATORY (INHALATION) at 07:40

## 2024-03-08 RX ADMIN — POTASSIUM CHLORIDE 40 MEQ: 29.8 INJECTION, SOLUTION INTRAVENOUS at 09:07

## 2024-03-08 RX ADMIN — ENOXAPARIN SODIUM 40 MG: 40 INJECTION SUBCUTANEOUS at 09:07

## 2024-03-08 RX ADMIN — METRONIDAZOLE 500 MG: 500 TABLET ORAL at 14:13

## 2024-03-08 RX ADMIN — NIRMATRELVIR AND RITONAVIR 3 TABLET: KIT at 18:34

## 2024-03-08 RX ADMIN — FAMOTIDINE 20 MG: 20 TABLET, FILM COATED ORAL at 18:34

## 2024-03-08 RX ADMIN — Medication 2 SPRAY: at 14:13

## 2024-03-08 RX ADMIN — IPRATROPIUM BROMIDE 0.5 MG: 0.5 SOLUTION RESPIRATORY (INHALATION) at 14:55

## 2024-03-08 RX ADMIN — IPRATROPIUM BROMIDE 0.5 MG: 0.5 SOLUTION RESPIRATORY (INHALATION) at 19:20

## 2024-03-08 RX ADMIN — NYSTATIN: 100000 POWDER TOPICAL at 09:11

## 2024-03-08 RX ADMIN — CHLORHEXIDINE GLUCONATE 0.12% ORAL RINSE 15 ML: 1.2 LIQUID ORAL at 20:38

## 2024-03-08 RX ADMIN — Medication 2 SPRAY: at 18:34

## 2024-03-08 RX ADMIN — FAMOTIDINE 20 MG: 20 TABLET, FILM COATED ORAL at 09:08

## 2024-03-08 RX ADMIN — OXYCODONE HYDROCHLORIDE 5 MG: 5 SOLUTION ORAL at 05:28

## 2024-03-08 RX ADMIN — TOPIRAMATE 100 MG: 100 TABLET, FILM COATED ORAL at 09:11

## 2024-03-08 RX ADMIN — POLYETHYLENE GLYCOL 3350 17 G: 17 POWDER, FOR SOLUTION ORAL at 09:07

## 2024-03-08 RX ADMIN — NYSTATIN: 100000 POWDER TOPICAL at 18:34

## 2024-03-08 RX ADMIN — Medication 2 SPRAY: at 22:46

## 2024-03-08 RX ADMIN — REMDESIVIR 100 MG: 100 INJECTION, POWDER, LYOPHILIZED, FOR SOLUTION INTRAVENOUS at 15:29

## 2024-03-08 RX ADMIN — METHYLPREDNISOLONE SODIUM SUCCINATE 60 MG: 125 INJECTION, POWDER, FOR SOLUTION INTRAMUSCULAR; INTRAVENOUS at 22:46

## 2024-03-08 RX ADMIN — LEVALBUTEROL HYDROCHLORIDE 1.25 MG: 1.25 SOLUTION RESPIRATORY (INHALATION) at 19:20

## 2024-03-08 RX ADMIN — METRONIDAZOLE 500 MG: 500 TABLET ORAL at 22:46

## 2024-03-08 RX ADMIN — SODIUM CHLORIDE 12 UNITS/HR: 9 INJECTION, SOLUTION INTRAVENOUS at 06:43

## 2024-03-08 RX ADMIN — METHYLPREDNISOLONE SODIUM SUCCINATE 60 MG: 125 INJECTION, POWDER, FOR SOLUTION INTRAMUSCULAR; INTRAVENOUS at 14:13

## 2024-03-08 RX ADMIN — FUROSEMIDE 40 MG: 10 INJECTION, SOLUTION INTRAMUSCULAR; INTRAVENOUS at 03:23

## 2024-03-08 RX ADMIN — LAMOTRIGINE 150 MG: 100 TABLET ORAL at 09:07

## 2024-03-08 RX ADMIN — SODIUM CHLORIDE 6 UNITS/HR: 9 INJECTION, SOLUTION INTRAVENOUS at 16:55

## 2024-03-08 NOTE — UTILIZATION REVIEW
Continued Stay Review    Date: 3/8/24                          Current Patient Class: INPT  Current Level of Care: TELE    HPI:59 y.o. female initially admitted on 1/10.     Assessment/Plan: WBC 18.10. K 2.8. CA 8.3. HGB 8.5. CRP 36.9. -146. MFNC @6L.IV Insulin gtt. IV antibiotic. IV steroid.IV laisx. IV remdesivir. IV KCL. TF W/ H2O FLUSHES.    Vital Signs:   Date/Time Temp Pulse Resp BP MAP (mmHg) SpO2 FiO2 (%) Calculated FIO2 (%) - Nasal Cannula O2 Flow Rate (L/min) Nasal Cannula O2 Flow Rate (L/min) O2 Device O2 Interface Device Patient Position - Orthostatic VS   03/08/24 1100 -- 107 Abnormal  26 Abnormal  117/75 90 91 % -- -- -- -- -- -- --   03/08/24 1033 -- -- -- -- -- -- -- -- 6 L/min -- Mid flow nasal cannula -- --   03/08/24 1000 -- 107 Abnormal  17 133/79 101 96 % -- -- 8 L/min -- -- -- --   03/08/24 0900 -- 130 Abnormal  20 120/75 93 91 % -- -- 12 L/min -- -- -- --   03/08/24 0800 100.5 °F (38.1 °C) 109 Abnormal  20 145/74 103 100 % -- -- 15 L/min -- Mid flow nasal cannula -- --   03/08/24 0744 -- 104 16 -- -- 100 % -- -- 12 L/min -- Mid flow nasal cannula -- --   03/08/24 0600 -- 135 Abnormal  25 Abnormal  121/76 92 92 % -- -- 15 L/min -- Mid flow nasal cannula -- --   03/08/24 0500 -- 146 Abnormal  40 Abnormal  131/90 105 99 % -- -- -- -- -- -- --   03/08/24 0400 97.9 °F (36.6 °C) 119 Abnormal  31 Abnormal  147/84 111 99 % -- -- -- -- -- -- --   03/08/24 0300 -- 127 Abnormal  27 Abnormal  165/86 118 100 % -- -- -- -- -- Face mask --   03/08/24 0200 -- 112 Abnormal  25 Abnormal  155/87 115 100 % -- -- -- -- -- -- --   03/08/24 0100 -- 110 Abnormal  42 Abnormal  159/88 117 100 % -- -- -- -- -- -- --   03/08/24 0000 99.8 °F (37.7 °C) 103 18 112/67 82 99 % -- -- -- -- -- -- --               -- --               -- --               -- --               -- --               -- --               -- --               -- --               Face mask --               -- --               -- --               -- --                -- --               -- --   03/07/24 1805 -- -- -- -- -- 92 % -- -- 6 L/min -- Mid flow nasal cannula -- --   03/07/24 1800 -- -- -- -- -- -- -- -- 6 L/min -- Mid flow nasal cannula -- --   03/07/24 1742 -- -- -- -- -- -- -- -- 4 L/min -- Mid flow nasal cannula -- --   03/07/24 1717 -- -- -- -- -- -- -- -- 6 L/min -- Mid flow nasal cannula -- --   03/07/24 1705 -- -- -- -- -- -- -- -- 8 L/min -- Mid flow nasal cannula -- --   03/07/24 1600 -- 103 15 -- -- 100 % -- -- -- -- -- -- --   03/07/24 1541 -- 106 Abnormal  14 103/59 78 100 % -- -- 10 L/min -- Mid flow nasal cannula MFNC prongs --   03/07/24 1500 -- 119 Abnormal  32 Abnormal  122/65 86 93 % -- -- -- -- -- -- --   03/07/24 1400 97.5 °F (36.4 °C) 106 Abnormal  14 136/78 100 92 % -- -- -- -- -- -- --   03/07/24 1333 -- 98 17 129/69 92 96 % 40 -- 40 L/min -- High flow nasal cannula HFNC prongs --   03/07/24 1300 -- 112 Abnormal  23 Abnormal  142/77 102 93 % -- -- -- -- -- -- --   03/07/24 1200 -- 112 Abnormal  22 144/76 104 96 % -- -- -- -- -- -- --   03/07/24 1100 100.6 °F (38.1 °C) Abnormal  112 Abnormal  26 Abnormal  143/83 105 95 % -- -- -- -- -- -- Lying   03/07/24 1000 -- 121 Abnormal  27 Abnormal  132/81 101 92 % -- -- -- -- -- -- --   03/07/24 0900 -- 122 Abnormal  25 Abnormal  126/63 87 91 % -- -- -- -- -- -- --   03/07/24 0801 97.9 °F (36.6 °C) 95 23 Abnormal  143/69 99 89 % Abnormal  45 -- 40 L/min -- High flow nasal cannula -- Lying   03/07/24 0751 -- -- -- -- -- -- 45 -- 40 L/min -- High flow nasal cannula HFNC prongs        Pertinent Labs/Diagnostic Results:   3/8 EKG:  Component  Ref Range & Units 3/8/24 0536         Ventricular Rate           Atrial Rate           DC Interval  ms          QRSD Interval  ms 68         QT Interval  ms 332         QTC Interval  ms 524         P Axis  degrees          QRS Axis  degrees 30         T Wave Axis  degrees 82             Results from last 7 days   Lab Units 03/03/24  6139    SARS-COV-2  Positive*     Results from last 7 days   Lab Units 03/08/24  0541 03/07/24  1829 03/07/24  1328 03/07/24  0435 03/06/24  1108 03/06/24  0441 03/04/24  1044 03/04/24  0605   WBC Thousand/uL 18.10*  --   --  7.75  --  7.51   < > 16.57*   HEMOGLOBIN g/dL 8.5* 8.1* 8.6* 8.1* 8.8* 6.5*   < > 7.6*   HEMATOCRIT % 26.7*  --  26.8* 25.0* 28.3* 21.5*   < > 25.2*   PLATELETS Thousands/uL 434*  --   --  250  --  238   < > 251   BANDS PCT %  --   --   --   --   --   --   --  2    < > = values in this interval not displayed.         Results from last 7 days   Lab Units 03/08/24  0541 03/07/24  1641 03/07/24  0435 03/06/24  1419 03/06/24  0441 03/05/24  1619 03/05/24  0458 03/04/24  1828 03/04/24  0605 03/03/24  2027 03/03/24  0448 03/02/24  0510 03/02/24  0510   SODIUM mmol/L 143 147 150*  --  147 152* 152*   < > 159*   < > 150*   < >  --    POTASSIUM mmol/L 2.8* 3.6 2.7*  --  3.3* 4.0 3.0*   < > 3.6   < > 3.4*   < >  --    CHLORIDE mmol/L 106 115* 112*  --  111* 121* 114*   < > 123*   < > 112*   < >  --    CO2 mmol/L 24 24 27  --  26 24 27   < > 28   < > 32   < >  --    ANION GAP mmol/L 13 8 11  --  10 7 11   < > 8   < > 6   < >  --    BUN mg/dL 31* 32* 34*  --  31* 28* 26*   < > 34*   < > 27*   < >  --    CREATININE mg/dL 0.56* 0.55* 0.48*  --  0.63 0.63 0.49*   < > 0.54*   < > 0.53*   < >  --    EGFR ml/min/1.73sq m 103 103 108  --  99 99 107   < > 104   < > 104   < >  --    CALCIUM mg/dL 8.3* 8.0* 8.6  --  9.2 9.3 9.5   < > 10.1   < > 9.8   < >  --    CALCIUM, IONIZED mmol/L  --   --   --  1.29  --   --   --   --   --   --   --   --   --    MAGNESIUM mg/dL 2.0  --  2.2  --  2.1  --  2.2  --  2.4  --  2.4  --  1.9   PHOSPHORUS mg/dL  --   --  2.5*  --   --   --   --   --  2.2*  --  3.1  --  1.8*    < > = values in this interval not displayed.     Results from last 7 days   Lab Units 03/04/24  0605 03/03/24  0448 03/02/24  0510   AST U/L 8* 11* 20   ALT U/L 17 19 27   ALK PHOS U/L 61 60 66   TOTAL PROTEIN g/dL  5.4* 5.3* 5.1*   ALBUMIN g/dL 3.0* 3.0* 3.2*   TOTAL BILIRUBIN mg/dL 0.53 0.81 0.82     Results from last 7 days   Lab Units 03/08/24  1010 03/08/24  0839 03/08/24  0551 03/08/24  0419 03/08/24  0204 03/08/24  0006 03/07/24  2201 03/07/24  2000 03/07/24  1832 03/07/24  1703 03/07/24  1456 03/07/24  1102   POC GLUCOSE mg/dl 134 131 201* 204* 141* 118 131 229* 297* 334* 173* 292*     Results from last 7 days   Lab Units 03/08/24  0541 03/07/24  1641 03/07/24  0435 03/06/24  0441 03/05/24  1619 03/05/24  0458 03/04/24  1828 03/04/24  0605 03/03/24  2027 03/03/24  0448 03/02/24  1832 03/02/24  0510   GLUCOSE RANDOM mg/dL 191* 329* 141* 206* 187* 103 106 179* 328* 255* 210* 145*       Results from last 7 days   Lab Units 03/06/24  1227 03/02/24  0438   PH ART  7.483* 7.481*   PCO2 ART mm Hg 33.4* 37.9   PO2 ART mm Hg 46.4* 77.6   HCO3 ART mmol/L 24.5 27.7   BASE EXC ART mmol/L 1.3 4.0   O2 CONTENT ART mL/dL 11.7* 12.9*   O2 HGB, ARTERIAL % 86.3* 94.1   ABG SOURCE  Radial, Right  --    NON VENT TYPE HFNC  HFNC Flow  --    HFNC FLOW LPM  40  --        Results from last 7 days   Lab Units 03/04/24  0605   D-DIMER QUANTITATIVE ug/ml FEU 1.47*       Results from last 7 days   Lab Units 03/03/24  1234   LACTIC ACID mmol/L 1.3       Results from last 7 days   Lab Units 03/03/24  0448   FERRITIN ng/mL 974*   IRON ug/dL <10*   TIBC ug/dL <173*         Results from last 7 days   Lab Units 03/08/24  0801 03/07/24  0555   UNIT PRODUCT CODE  [id number]  [id number]   UNIT NUMBER  [id number]  [id number]   UNITABO  A  A A   UNITRH  NEG  NEG NEG   CROSSMATCH  Compatible  Compatible Compatible   UNIT DISPENSE STATUS  Return to Inv  Return to Inv Presumed Trans   UNIT PRODUCT VOL mL 350  350 350             Results from last 7 days   Lab Units 03/08/24  0541 03/07/24  0435 03/06/24  0441 03/05/24  0458 03/04/24  0605   CRP mg/L 36.9* 109.6* 172.3* 97.3* 215.2*             Results from last 7 days   Lab  Units 03/04/24  1809   CLARITY UA  Clear   COLOR UA  Colorless   SPEC GRAV UA  1.028   PH UA  8.0   GLUCOSE UA mg/dl Negative   KETONES UA mg/dl Negative   BLOOD UA  Negative   PROTEIN UA mg/dl Negative   NITRITE UA  Negative   BILIRUBIN UA  Negative   UROBILINOGEN UA (BE) mg/dl <2.0   LEUKOCYTES UA  Negative   WBC UA /hpf 4-10*   RBC UA /hpf 1-2   BACTERIA UA /hpf None Seen   EPITHELIAL CELLS WET PREP /hpf None Seen     Results from last 7 days   Lab Units 03/03/24  1406   INFLUENZA A PCR  Negative   INFLUENZA B PCR  Negative   RSV PCR  Negative       Results from last 7 days   Lab Units 03/06/24  0441 03/05/24  0458   VANCOMYCIN RM ug/mL 29.1* 15.7       Medications:   Scheduled Medications:  cefepime, 2,000 mg, Intravenous, Q8H  chlorhexidine, 15 mL, Mouth/Throat, Q12H SARTHAK  enoxaparin, 40 mg, Subcutaneous, Q24H SARTHAK  famotidine, 20 mg, Per NG Tube, BID  furosemide, 20 mg, Intravenous, BID (diuretic)  ipratropium, 0.5 mg, Nebulization, TID  lamoTRIgine, 150 mg, Oral, BID  levalbuterol, 1.25 mg, Nebulization, TID  [START ON 3/9/2024] methylPREDNISolone sodium succinate, 50 mg, Intravenous, Q8H SARTHAK  methylPREDNISolone sodium succinate, 60 mg, Intravenous, Q8H SARTHAK  nirmatrelvir & ritonavir, 3 tablet, Oral, BID  nystatin, , Topical, BID  oxymetazoline, 2 spray, Each Nare, BID  polyethylene glycol, 17 g, Per NG Tube, Daily  potassium chloride, 40 mEq, Intravenous, Once  potassium chloride, 40 mEq, Intravenous, Once  remdesivir, 100 mg, Intravenous, Q24H  sodium chloride, 2 spray, Each Nare, Q4H  topiramate, 100 mg, Per PEG Tube, BID  vancomycin, 750 mg, Intravenous, Q12H      Continuous IV Infusions:  insulin regular (HumuLIN R,NovoLIN R) 1 Units/mL in sodium chloride 0.9 % 100 mL infusion, 0.3-21 Units/hr, Intravenous, Titrated      PRN Meds:  acetaminophen, 650 mg, Oral, Q6H PRN 3/8 X 1  hydrALAZINE, 10 mg, Intravenous, Q6H PRN  OLANZapine, 10 mg, Oral, HS PRN  ondansetron, 4 mg, Intravenous, Q6H PRN  oxyCODONE, 2.5  mg, Oral, Q4H PRN   Or  oxyCODONE, 5 mg, Oral, Q4H PRN 3/8 X 1  sodium chloride, 1 Application, Nasal, Q1H PRN        Discharge Plan: TBD    Network Utilization Review Department  ATTENTION: Please call with any questions or concerns to [phone] and carefully listen to the prompts so that you are directed to the right person. All voicemails are confidential.   For Discharge needs, contact Care Management DC Support Team at [phone] opt. [phone]  Send all requests for admission clinical reviews, approved or denied determinations and any other requests to dedicated fax number below belonging to the campus where the patient is receiving treatment. List of dedicated fax numbers for the Facilities:  FACILITY NAME UR FAX NUMBER   ADMISSION DENIALS (Administrative/Medical Necessity) [phone]   DISCHARGE SUPPORT TEAM (NETWORK) [phone]   PARENT CHILD HEALTH (Maternity/NICU/Pediatrics) [phone]   [facility] [phone]   [facility] [phone]   [facility] [phone]   [facility] [phone]   [facility] [phone]   [facility] [phone]   [facility] [phone]   [facility] [phone]   [facility] [phone]   [facility] [phone]   [facility] [phone]   [facility] [phone]

## 2024-03-08 NOTE — SPEECH THERAPY NOTE
Speech-Language Pathology Bedside Swallow Evaluation    Patient Name: Carla Hunt    Today's Date: 3/8/2024     Problem List  Principal Problem:    Acute respiratory failure with hypoxia (HCC)  Active Problems:    Anxiety state    COVID    Stage 3b chronic kidney disease (CKD) (HCC)    Teto marginal zone B-cell lymphoma (HCC)    Seizure disorder (HCC)    Hypotension    Palliative care patient    Goals of care, counseling/discussion    Acute kidney injury (HCC)    Cecal volvulus (HCC)    Past Medical History  Past Medical History:   Diagnosis Date    Hx of hypercalcemia     Lymphoma (HCC) 2021    Parathyroid disease (HCC)     Seizures (HCC)     Situational depression     24 yr old son  from drug overdose     Past Surgical History  Past Surgical History:   Procedure Laterality Date    CRANIOTOMY FOR TEMPORAL LOBECTOMY Left     OK LAPS ABD PRTM&OMENTUM DX W/WO SPEC BR/WA SPX N/A 2024    Procedure: LAPAROSCOPY DIAGNOSTIC,EXPLORATORY LAPAROTOMY, RIGHT KARY COLECTOMY,ILEOSTOMY, MUCUS FISTULA;  Surgeon: Lauryn Ullrich, DO;  Location: [facility];  Service: General       Summary  Pt has had a prolonged and complex hospitalization, seen for dysphagia assessment today following extubation from 17 days on vent, in addition to several days of BiPap support. She is aphonic and able to follow simple commands inconsistently. Pt presented with s/s suggestive of severe oropharyngeal dysphagia. Symptoms or concerns included decreased bolus acceptance, decreased bolus propulsion, decreased bolus formation, and suspected decreased control of all consistencies, as well as  pharyngeal swallow delay, decreased hyolaryngeal elevation upon palpation, and effortful swallow. Weak and breathy cough occurred following conservative trials of only ice, thin via tsp, and HTL x2. NGT in place for nutrition. Not yet appropriate for MBS however will f/u daily for reassessment, and MBS when indicated.  reported no  speech/swallowing deficits prior to current hospitalization.     Risk/s for Aspiration: high    Recommended Diet: NPO with tube feeds   Recommended Form of Meds:  non oral    Aspiration precautions and swallowing strategies:  TF precautions, PO trials with SLP only  Other Recommendations: Continue frequent oral care      Current Medical Status per ID progress note 3/8/24  Impression/Recommendations:  1. SIRS  versus sepsis.  Fluctuating fever, WBC.  Fevers may be multifactorial due to COVID (still positive antigen and PCR) versus inflammation (seem to correlate to steroid dosing).  Also consider developing bacterial infection.  Recent blood cultures 2/26 negative.  CT C/A/P shows cellulitic and subcutaneous gas around the ostomy site and end ileostomy is dusky, suspect this is more due to ischemic changes than true infection.  CT chest also shows ongoing changes of pneumonitis with possible developing bacterial pneumonia in the lateral right middle lobe.    -Continue steroids, antivirals for COVID as below  -continue cefepime 2 g every 8 hours for possible infection on the ostomy versus developing pneumonia. Complete 7 days of therapy as long as patient clinically improving, through 3/10/24  -Add Flagyl for anaerobic coverage in setting of ostomy  -Stop vancomycin  -Monitor abdominal exam  -Repeat CBC, CRP tomorrow to monitor treatment response  -Monitor fever curve, hemodynamics  2. Recent bacterial pneumonia.  The patient has completed multiple treatment courses over the hospitalization.  Most recent BAL culture with Pseudomonas and stenotrophomonas.  Unclear if pathogens or colonizers.  Status post 10 days levofloxacin.  CT C/A/P showed evolving changes representing combination of evolving pneumonitis secondary to viral COVID infection, improving bacterial infection in the posterior lower lung zones, developing consolidation in the lateral aspect of the right middle lobe concerning for bacterial infection.   Patient currently on high flow nasal cannula              -continue IV cefepime as above              -Wean O2 as able  3. Severe COVID, present on admission.  Patient was treated with a 10-day course of remdesivir, dexamethasone and was given 1 dose of Tocilizumab on admission.  COVID antigen was negative prior to coming off isolation.  Had positive COVID PCR from BAL 2/16, status post another 5-day course of remdesivir.  Patient has remained on high-dose systemic corticosteroid throughout her hospitalization which were recently intensified 2/26 for fevers.  Patient having persistent fevers, hypoxia.  COVID antigen positive and PCR is also positive 3/3 and Ct 26.8, consistent with high viral replication  -Continue remdesivir/Paxlovid per persistent COVID guidelines, continue for at least 10 days through 3/11/2024.  -Resend COVID PCR 3/11/2024.  If cycle threshold remains below 30, will extend therapy to complete a total of 14 days  -steroid taper per critical care  4. Acute hypoxic respiratory failure, likely multifactorial, with both COVID and bacterial pneumonia contributing.  Also consider persistent inflammation, fibrosis as seems quite steroid responsive.  Most recently extubated 3/1.    -Antivirals as above  -steroid taper per critical care   5. 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  Patient is status post exploratory laparotomy with ileocecectomy and end ileostomy creation 2/20.  CT now concerning for cellulitic changes around the ostomy.  End ileostomy is with ongoing ischemic changes which is likely contributing to the imaging findings and ongoing SIRS response  -Serial exams  -Surgery follow-up  -Antibiotics as above  B-cell lymphoma, on maintenance rituxan, which is currently postponed.  Rituximab is a risk factor for persistent COVID infection.     Pt was intubated 2/13/24-3/1/24, has since been on BiPap    Special Studies:  CXR 3/7/24 IMPRESSION:  Diffuse patchy opacification in the  right lung, either pneumonia or asymmetric pulmonary edema, improved in appearance since 3/5/2024.  Persistent atelectasis and/or consolidation in the base of the left lower lobe.  CT head 3/6/24 IMPRESSION:  No acute intracranial abnormality.  Stable postoperative changes related to left temporal lobectomy.  Mild chronic microangiopathic ischemic changes.  New air-fluid levels within the left maxillary and bilateral sphenoid sinuses suggestive of acute on chronic sinusitis. New bilateral mastoid fluid.       Social/Education/Vocational Hx:  Pt lives with family    Swallow Information   Current Risks for Dysphagia & Aspiration: recent intubation and prolonged intubation  Current Diet: NPO with tube feeds   Baseline Diet: regular diet and thin liquids      Baseline Assessment   Behavior/Cognition: alert  Speech/Language Status: able to follow commands inconsistently  Patient Positioning: upright in chair  Pain Status/Interventions/Response to Interventions: No report of or nonverbal indications of pain.       Swallow Mechanism Exam  Facial: symmetrical  Labial: WFL  Lingual: decreased ROM and decreased coordination  Velum: unable to visualize  Mandible: adequate ROM  Dentition: adequate  Vocal quality:aphonic   Volitional Cough: weak   Respiratory Status: on MFNC 8L      Consistencies Assessed and Performance   Consistencies Administered: ice chips, thin liquids via tsp x1, and honey thick    Oral Stage: moderate-severe, decreased bolus acceptance, decreased bolus formation, delayed oral intiation, and suspected decreased control of all materials     Pharyngeal Stage: severe, pharyngeal swallow delay, decreased hyolaryngeal elevation upon palpation, and effortful swallow. Weak and breathy cough noted after all trials.    Esophageal Concerns: none reported      Summary and Recommendations (see above)    Results Reviewed with: patient, RN, MD, and family     Treatment Recommended: yes     Frequency of treatment:  3-5x/week      Dysphagia LTG  -Patient will demonstrate safe and effective oral intake (without overt s/s significant oral/pharyngeal dysphagia including s/s penetration or aspiration) for the highest appropriate diet level.     Short Term Goals:  -Pt will tolerate the least restrictive diet with the least restrictive liquid consistency without s/s of aspiration x72hrs.    -Patient will tolerate trials of upgraded food and/or liquid texture with no significant s/s of oral or pharyngeal dysphagia including aspiration across 1-3 diagnostic sessions     -Patient will comply with a Video/Modified Barium Swallow study for more complete assessment of swallowing anatomy/physiology/aspiration risk and to assess efficacy of treatment techniques so as to best guide treatment plan

## 2024-03-08 NOTE — RESTORATIVE TECHNICIAN NOTE
Restorative Technician Note      Patient Name: Carla Hutn     Restorative Tech Visit Date: 03/08/24  Note Type: Mobility  Patient Position Upon Consult: Supine  Activity Performed: Transferred  Assistive Device: Other (Comment) (A X 2)  Education Provided: Yes  Patient Position at End of Consult: Bedside chair; All needs within reach; Bed/Chair alarm activated    Marixa العراقي Restorative Tech

## 2024-03-08 NOTE — PLAN OF CARE
Problem: Prexisting or High Potential for Compromised Skin Integrity  Goal: Skin integrity is maintained or improved  Description: INTERVENTIONS:  - Identify patients at risk for skin breakdown  - Assess and monitor skin integrity  - Assess and monitor nutrition and hydration status  - Monitor labs   - Assess for incontinence   - Turn and reposition patient  - Assist with mobility/ambulation  - Relieve pressure over bony prominences  - Avoid friction and shearing  - Provide appropriate hygiene as needed including keeping skin clean and dry  - Evaluate need for skin moisturizer/barrier cream  - Collaborate with interdisciplinary team   - Patient/family teaching  - Consider wound care consult   Outcome: Progressing     Problem: INFECTION - ADULT  Goal: Absence or prevention of progression during hospitalization  Description: INTERVENTIONS:  - Assess and monitor for signs and symptoms of infection  - Monitor lab/diagnostic results  - Monitor all insertion sites, i.e. indwelling lines, tubes, and drains  - Monitor endotracheal if appropriate and nasal secretions for changes in amount and color  - [place] appropriate cooling/warming therapies per order  - Administer medications as ordered  - Instruct and encourage patient and family to use good hand hygiene technique  - Identify and instruct in appropriate isolation precautions for identified infection/condition  Outcome: Progressing     Problem: SAFETY ADULT  Goal: Patient will remain free of falls  Description: INTERVENTIONS:  - Educate patient/family on patient safety including physical limitations  - Instruct patient to call for assistance with activity   - Consult OT/PT to assist with strengthening/mobility   - Keep Call bell within reach  - Keep bed low and locked with side rails adjusted as appropriate  - Keep care items and personal belongings within reach  - Initiate and maintain comfort rounds  - Make Fall Risk Sign visible to staff  - Offer Toileting every  2 Hours, in advance of need  - Initiate/Maintain bed alarm  - Obtain necessary fall risk management equipment: non skid footwear  - Apply yellow socks and bracelet for high fall risk patients  - Consider moving patient to room near nurses station  Outcome: Progressing     Problem: RESPIRATORY - ADULT  Goal: Achieves optimal ventilation and oxygenation  Description: INTERVENTIONS:  - Assess for changes in respiratory status  - Assess for changes in mentation and behavior  - Position to facilitate oxygenation and minimize respiratory effort  - Oxygen administered by appropriate delivery if ordered  - Initiate smoking cessation education as indicated  - Encourage broncho-pulmonary hygiene including cough, deep breathe, Incentive Spirometry  - Assess the need for suctioning and aspirate as needed  - Assess and instruct to report SOB or any respiratory difficulty  - Respiratory Therapy support as indicated  Outcome: Progressing     Problem: Nutrition/Hydration-ADULT  Goal: Nutrient/Hydration intake appropriate for improving, restoring or maintaining nutritional needs  Description: Monitor and assess patient's nutrition/hydration status for malnutrition. Collaborate with interdisciplinary team and initiate plan and interventions as ordered.  Monitor patient's weight and dietary intake as ordered or per policy. Utilize nutrition screening tool and intervene as necessary. Determine patient's food preferences and provide high-protein, high-caloric foods as appropriate.     INTERVENTIONS:  - Monitor oral intake, urinary output, labs, and treatment plans  - Assess nutrition and hydration status and recommend course of action  - Evaluate amount of meals eaten  - Assist patient with eating if necessary   - Allow adequate time for meals  - Recommend/ encourage appropriate diets, oral nutritional supplements, and vitamin/mineral supplements  - Order, calculate, and assess calorie counts as needed  - Recommend, monitor, and adjust  tube feedings and TPN/PPN based on assessed needs  - Assess need for intravenous fluids  - Provide specific nutrition/hydration education as appropriate  - Include patient/family/caregiver in decisions related to nutrition  Outcome: Progressing     Problem: SAFETY,RESTRAINT: NV/NON-SELF DESTRUCTIVE BEHAVIOR  Goal: Remains free of harm/injury (restraint for non violent/non self-detsructive behavior)  Description: INTERVENTIONS:  - Instruct patient/family regarding restraint use   - Assess and monitor physiologic and psychological status   - Provide interventions and comfort measures to meet assessed patient needs   - Identify and implement measures to help patient regain control  - Assess readiness for release of restraint   Outcome: Progressing     Problem: NEUROSENSORY - ADULT  Goal: Achieves stable or improved neurological status  Description: INTERVENTIONS  - Monitor and report changes in neurological status  - Monitor vital signs such as temperature, blood pressure, glucose, and any other labs ordered   - Initiate measures to prevent increased intracranial pressure  - Monitor for seizure activity and implement precautions if appropriate      Outcome: Progressing     Problem: CARDIOVASCULAR - ADULT  Goal: Maintains optimal cardiac output and hemodynamic stability  Description: INTERVENTIONS:  - Monitor I/O, vital signs and rhythm  - Monitor for S/S and trends of decreased cardiac output  - Administer and titrate ordered vasoactive medications to optimize hemodynamic stability  - Assess quality of pulses, skin color and temperature  - Assess for signs of decreased coronary artery perfusion  - Instruct patient to report change in severity of symptoms  Outcome: Progressing     Problem: GASTROINTESTINAL - ADULT  Goal: Minimal or absence of nausea and/or vomiting  Description: INTERVENTIONS:  - Administer IV fluids if ordered to ensure adequate hydration  - Maintain NPO status until nausea and vomiting are resolved  -  Nasogastric tube if ordered  - Administer ordered antiemetic medications as needed  - Provide nonpharmacologic comfort measures as appropriate  - Advance diet as tolerated, if ordered  - Consider nutrition services referral to assist patient with adequate nutrition and appropriate food choices  Outcome: Progressing     Problem: GASTROINTESTINAL - ADULT  Goal: Establish and maintain optimal ostomy function  Description: INTERVENTIONS:  - Assess bowel function  - Encourage oral fluids to ensure adequate hydration  - Administer IV fluids if ordered to ensure adequate hydration   - Administer ordered medications as needed  - Encourage mobilization and activity  - Nutrition services referral to assist patient with appropriate food choices  - Assess stoma site  - Consider wound care consult   Outcome: Progressing     Problem: GASTROINTESTINAL - ADULT  Goal: Maintains or returns to baseline bowel function  Description: INTERVENTIONS:  - Assess bowel function  - Encourage oral fluids to ensure adequate hydration  - Administer IV fluids if ordered to ensure adequate hydration  - Administer ordered medications as needed  - Encourage mobilization and activity  - Consider nutritional services referral to assist patient with adequate nutrition and appropriate food choices  Outcome: Progressing     Problem: GENITOURINARY - ADULT  Goal: Urinary catheter remains patent  Description: INTERVENTIONS:  - Assess patency of urinary catheter  - If patient has a chronic marie, consider changing catheter if non-functioning  - Follow guidelines for intermittent irrigation of non-functioning urinary catheter  Outcome: Progressing     Problem: METABOLIC, FLUID AND ELECTROLYTES - ADULT  Goal: Electrolytes maintained within normal limits  Description: INTERVENTIONS:  - Monitor labs and assess patient for signs and symptoms of electrolyte imbalances  - Administer electrolyte replacement as ordered  - Monitor response to electrolyte replacements,  including repeat lab results as appropriate  - Instruct patient on fluid and nutrition as appropriate  Outcome: Progressing

## 2024-03-08 NOTE — SOCIAL WORK
PCS SW observed the pt awake and sitting up in the hospital chair.  Her  and family were visiting. Nursing was working with the pt and when they exited the room, they confirmed the pt continues to improve medically.

## 2024-03-08 NOTE — PROGRESS NOTES
Progress Note - Infectious Disease   Carla Hunt 59 y.o. female MRN: [id number]  [facility]: [id number]      Impression/Recommendations:  1. SIRS  versus sepsis.  Fluctuating fever, WBC.  Fevers may be multifactorial due to COVID (still positive antigen and PCR) versus inflammation (seem to correlate to steroid dosing).  Also consider developing bacterial infection.  Recent blood cultures 2/26 negative.  CT C/A/P shows cellulitic and subcutaneous gas around the ostomy site and end ileostomy is dusky, suspect this is more due to ischemic changes than true infection.  CT chest also shows ongoing changes of pneumonitis with possible developing bacterial pneumonia in the lateral right middle lobe.    -Continue steroids, antivirals for COVID as below  -continue cefepime 2 g every 8 hours for possible infection on the ostomy versus developing pneumonia. Complete 7 days of therapy as long as patient clinically improving, through 3/10/24  -Add Flagyl for anaerobic coverage in setting of ostomy  -Stop vancomycin  -Monitor abdominal exam  -Repeat CBC, CRP tomorrow to monitor treatment response  -Monitor fever curve, hemodynamics     2. Recent bacterial pneumonia.  The patient has completed multiple treatment courses over the hospitalization.  Most recent BAL culture with Pseudomonas and stenotrophomonas.  Unclear if pathogens or colonizers.  Status post 10 days levofloxacin.  CT C/A/P showed evolving changes representing combination of evolving pneumonitis secondary to viral COVID infection, improving bacterial infection in the posterior lower lung zones, developing consolidation in the lateral aspect of the right middle lobe concerning for bacterial infection.  Patient currently on high flow nasal cannula              -continue IV cefepime as above              -Wean O2 as able     3. Severe COVID, present on admission.  Patient was treated with a 10-day course of remdesivir, dexamethasone and was  given 1 dose of Tocilizumab on admission.  COVID antigen was negative prior to coming off isolation.  Had positive COVID PCR from BAL 2/16, status post another 5-day course of remdesivir.  Patient has remained on high-dose systemic corticosteroid throughout her hospitalization which were recently intensified 2/26 for fevers.  Patient having persistent fevers, hypoxia.  COVID antigen positive and PCR is also positive 3/3 and Ct 26.8, consistent with high viral replication  -Continue remdesivir/Paxlovid per persistent COVID guidelines, continue for at least 10 days through 3/11/2024.  -Resend COVID PCR 3/11/2024.  If cycle threshold remains below 30, will extend therapy to complete a total of 14 days  -steroid taper per critical care     4. Acute hypoxic respiratory failure, likely multifactorial, with both COVID and bacterial pneumonia contributing.  Also consider persistent inflammation, fibrosis as seems quite steroid responsive.  Most recently extubated 3/1.    -Antivirals as above  -steroid taper per critical care     5. 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  Patient is status post exploratory laparotomy with ileocecectomy and end ileostomy creation 2/20.  CT now concerning for cellulitic changes around the ostomy.  End ileostomy is with ongoing ischemic changes which is likely contributing to the imaging findings and ongoing SIRS response  -Serial exams  -Surgery follow-up  -Antibiotics as above     B-cell lymphoma, on maintenance rituxan, which is currently postponed.  Rituximab is a risk factor for persistent COVID infection.     I discussed with the CC fellow the above plan to continue current treatment course and recheck CBC in AM.  He agrees with the plan.  I will reassess the patient on Monday 3/11.  Please call in the interim with new questions.     Antibiotics:  Day 7 Paxlovid/remdesivir  Day 5 vancomycin/Cefepime    Subjective/24 Hour Events:  Patient seen on AM rounds.  Doing better.   WBC worse this AM.  RN reports no new issues.  Ostomy remains dusky but functional.    Objective:  Vitals:  Temp:  [97.5 °F (36.4 °C)-100.5 °F (38.1 °C)] 100.5 °F (38.1 °C)  HR:  [] 107  Resp:  [14-42] 26  BP: (103-173)/(59-96) 117/75  SpO2:  [89 %-100 %] 91 %  Temp (24hrs), Av.2 °F (37.3 °C), Min:97.5 °F (36.4 °C), Max:100.5 °F (38.1 °C)  Current: Temperature: 100.5 °F (38.1 °C)    Physical Exam:   General:  No acute distress  Psychiatric:  Awake and alert  Pulmonary:  Normal respiratory excursion without accessory muscle use  Abdomen:  Soft, nontender, ostomy dusky with red liuid output  Extremities:  No edema  Skin:  No rashes    Lab Results:  I have personally reviewed pertinent labs.  Results from last 7 days   Lab Units 24  0541 24  1641 24  0435 24  1828 24  0605 24  2027 24  0448 24  1832 24  0510   SODIUM mmol/L 143 147 150*   < > 159*   < > 150*   < > 148*   POTASSIUM mmol/L 2.8* 3.6 2.7*   < > 3.6   < > 3.4*   < > 3.7   CHLORIDE mmol/L 106 115* 112*   < > 123*   < > 112*   < > 108   CO2 mmol/L 24 24 27   < > 28   < > 32   < > 29   BUN mg/dL 31* 32* 34*   < > 34*   < > 27*   < > 28*   CREATININE mg/dL 0.56* 0.55* 0.48*   < > 0.54*   < > 0.53*   < > 0.51*   EGFR ml/min/1.73sq m 103 103 108   < > 104   < > 104   < > 106   CALCIUM mg/dL 8.3* 8.0* 8.6   < > 10.1   < > 9.8   < > 10.1   AST U/L  --   --   --   --  8*  --  11*  --  20   ALT U/L  --   --   --   --  17  --  19  --  27   ALK PHOS U/L  --   --   --   --  61  --  60  --  66    < > = values in this interval not displayed.     Results from last 7 days   Lab Units 24  0541 24  1829 24  1328 24  0435 24  1108 24  0441   WBC Thousand/uL 18.10*  --   --  7.75  --  7.51   HEMOGLOBIN g/dL 8.5* 8.1* 8.6* 8.1*   < > 6.5*   PLATELETS Thousands/uL 434*  --   --  250  --  238    < > = values in this interval not displayed.     Results from last 7 days   Lab Units  03/05/24  1113   MRSA CULTURE ONLY  No Methicillin Resistant Staphlyococcus aureus (MRSA) isolated       Imaging Studies:   I have personally reviewed pertinent imaging study reports and images in PACS.  CXR images reviewed personally improved patchy infiltrates    EKG, Pathology, and Other Studies:   I have personally reviewed pertinent reports.

## 2024-03-08 NOTE — PROGRESS NOTES
[facility]  Progress Note: Critical Care  Name: Carla Hunt 59 y.o. 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  Anemia requiring transfusion  Hypernatremia, hypokalemia  B-cell lymphoma with history of treatment with Rituxan  Minimal SAH with no neurosurgical intervention indicated at the time  Seizure disorder s/p left temporal lobe partial resection  Steroid-induced hyperglycemia  Sacral ulcers bilaterally     Neuro:   Sedation: Off sedation  -Monitor for withdrawal in context of patients history of seizures     Diagnosis: Analgesia  -On oxycodone 2.5 mg and 5 mg every 4 hours PRN     Diagnosis: seizure disorder  -S/p partial left temporal lobe resection in 2007  -On Lamictal 150 bid and Topamax 100 bid  -Last lamotrigine level from 03/02 at 10.7 (therapeutic)     Diagnosis: Anxiety  -Venlafaxine on hold for now due to changes in cognition/mentation currently, can be restarted once patient clinically improved     CV:   Diagnosis: Distributive shock, resolved  -Off pressors  -Maintain MAPs > 65 mmHg     Pulm:  Diagnosis: Acute hypoxic respiratory failure due to severe covid  and covid induced fibrosis  -Completed severe COVID pathway and 7 days CTX initially. Then completed 5 day course of remdesevir in late February for Covid19 positive PCR from bronchoscopic cultures. Now covid positive with antigen testing  -S/p Bronch 2/2, 02/16,  02/21  -PCP and AFB negative   -Legionella, viral, fungal cultures no growth to date  -Pulse dose steroids with solumedrol 1g daily x3 days (completed 2/7) then transitioned to prednisone 80mg daily on 2/8. Also completed veletri treatment and 5 day course of IVIG.   -CT scan 2/24: general improvement of areas of consolidation and some areas of groundglass opacification on the lungs, still with significant groundglass opacification especially in the lower lobes. Bilateral consolidations in lower lobes. No evidence of PE.  -Extubated 03/01. Was on bipap  -Currently on solumedrol  60 mg Q8H  -Planning for CRP daily  -CRP trend  215.2 (03/04) > 97.3 (03/05) > 172.3 (03/06) > 109.6 (03/07)     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  -Monitor output of ostomy pouch and Hemoglobin  -Stoma appearing slightly retracted, still having some output  -Surgery following, will keep them updated if any further changes  -Monitor ostomy output  -Tube feeds running     -On famotidine 20 mg QD for GI prophylaxis and to reduce risk of upper GI bleed in this patient on significant amount of steroid regimen     :   Diagnosis: CKD III  -Baseline Cr appears to be 0.8-1.2  -UA unremarkable   -Upon chart review 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) though unclear how this was diagnosed, this also does not enirely fit as she is NOT hypokalemic  -Continue to monitor I/O and UOP     Diagnosis: Hypernatremia,  currently at 147  -Continue on free water flushes     Diagnosis: Acute kidney injury, sCr at 0.55, resolved     F/E/N:   F: Off IVF  E: monitor and replete for goal K>4, P>3, Mg>2  N: On tube feeds with vital 1.5 with free water flushes 350 mL every 4 hours     Heme/Onc:   Diagnosis: Anemia  Likely multifactorial in nature, acute in the setting of recent sepsis/inflammatory state complicated by chronic anemia due to her other history of lymphoma and CKD  -She did  receive 1 unit transfusion on 02/29, another unit on 03/06  -Iron panel with ferritin 974, iron 10, TIBC 173     Diagnosis: Thrombocytopenia, resolved.   -Will continue to monitor  -Monitor sites for bleeding     Diagnosis: B cell lymphoma  -Follows with heme/onc at [facility]  -On rituxan for 2 year course, started May 2022  -Last dose was 12/20, treatment currently on hold   -Leukopenic on admission but WBC now wnl     DVT ppx with lovenox     Endo:   Diagnosis: steroid hyperglycemia and diabetes mellitus type 2, A1c at 6.6 from 01/2024  -Goal -180  -BG range last 24hrs 105-206  -On insulin drip     ID:   Diagnosis: Severe covid pneumonia and stenotrophomonas pneumonia  -Completed severe COVID pathway with decadron (ended 1/22) and remdesivir (ended 1/20), also completed 7 days CTX on 1/15  -C/f opportunistic infections given immunocompromised status on chemotherapy and recent steroid use  -No consolidations on CXR and procal negative  -S/p bactrim and minocycline x5 days for stenotrophomonas on sputum culture (1/25), then on bactrim for PJP ppx  -Bronc on 2/2 - Cx w/o growth (initially resulted as 1+ alpha hemolytic strep), AFB & PCP negative, f/u fungal, legionella, and viral cultures   -UA with moderate leukocytes, nitrite negative, 30-50 WBC, trace protein.  -Repeat sputum culture 2/9 with 4+ stenotrophomonas  -Bronch cultures with candida albicans, Rare gram positive cocci in pairs  -Previously completed 14 day course of antibiotics for stenotrophomonas pneumonia  -Levaquin continued for an additional 7 days since bronchial cultures with pseudomonas MDR susceptible to levaquin. Levaquin discontinued at this point since patient has likely completed >7 days of therapy for pseudomonas susceptible to antibiotics.  -RSV/Flu/Covid panel positive, we are using that to assess the amount of covid viral particle shedding quantitatively as we continue to treat her with remdesevir and paxlovid  -On Vancomycin and  cefepime to cover treatment for stoma-wall cellulitis and possible pneumonia, day 5 today. May have to consider adding anaerobic coverage  -On remdesecir and paxlovid for 14 day course     MSK/Skin:   -Wound care team following for bilateral sacral wounds     Disposition: Critical care    ICU Core Measures     A: Assess, Prevent, and Manage Pain Has pain been assessed? Yes  Need for changes to pain regimen? No   B: Both SAT/SAT  N/A   C: Choice of Sedation RASS Goal: 0 Alert and Calm  Need for changes to sedation or analgesia regimen? No   D: Delirium CAM-ICU: Negative   E: Early Mobility  Plan for early mobility? Yes   F: Family Engagement Plan for family engagement today? Yes       Antibiotic Review: Continue broad spectrum secondary to severity of illness.     Review of Invasive Devices:          Prophylaxis:  VTE VTE covered by:  enoxaparin, Subcutaneous, 40 mg at 03/07/24 0917       Stress Ulcer  covered byfamotidine (PEPCID) tablet 20 mg [[id number]]        Significant 24hr Events     24hr events: no acute events overnight     Subjective     Review of Systems   Unable to perform ROS: Patient nonverbal        Objective                            Vitals I/O      Most Recent Min/Max in 24hrs   Temp 99.8 °F (37.7 °C) Temp  Min: 97.5 °F (36.4 °C)  Max: 100.6 °F (38.1 °C)   Pulse (!) 110 Pulse  Min: 93  Max: 147   Resp (!) 42 Resp  Min: 14  Max: 42   /88 BP  Min: 103/59  Max: 176/97   O2 Sat 100 % SpO2  Min: 89 %  Max: 100 %      Intake/Output Summary (Last 24 hours) at 3/8/2024 0213  Last data filed at 3/8/2024 0101  Gross per 24 hour   Intake 4026.82 ml   Output 2805 ml   Net 1221.82 ml       Diet Enteral/Parenteral; Tube Feeding No Oral Diet; Vital 1.5; Continuous; 50; Prosource Protein Liquid - One Packet; 350; Water; Every 4 hours    Invasive Monitoring           Physical Exam   Physical Exam  Eyes:      Pupils: Pupils are equal, round, and reactive to light.   Skin:     General: Skin is warm and dry.    HENT:      Head: Atraumatic.   Cardiovascular:      Rate and Rhythm: Tachycardia present.   Abdominal: General: An ostomy site is present. There is ostomy site.  Constitutional:       General: She is not in acute distress.     Appearance: She is ill-appearing.   Pulmonary:      Effort: Pulmonary effort is normal. No respiratory distress.   Neurological:      Mental Status: She is somnolent.            Diagnostic Studies      EKG:   Imaging: none new      Medications:  Scheduled PRN   cefepime, 2,000 mg, Q8H  chlorhexidine, 15 mL, Q12H SARTHAK  enoxaparin, 40 mg, Q24H SARTHAK  famotidine, 20 mg, BID  ipratropium, 0.5 mg, TID  lamoTRIgine, 150 mg, BID  levalbuterol, 1.25 mg, TID  methylPREDNISolone sodium succinate, 60 mg, Q8H SARTHAK  nirmatrelvir & ritonavir, 3 tablet, BID  nystatin, , BID  oxymetazoline, 2 spray, BID  polyethylene glycol, 17 g, Daily  remdesivir, 100 mg, Q24H  senna, 1 tablet, BID  sodium chloride, 2 spray, Q4H  topiramate, 100 mg, BID  vancomycin, 1,000 mg, Q12H      acetaminophen, 650 mg, Q6H PRN  hydrALAZINE, 10 mg, Q6H PRN  metoclopramide, 5 mg, Q6H PRN  OLANZapine, 10 mg, HS PRN  ondansetron, 4 mg, Q6H PRN  oxyCODONE, 2.5 mg, Q4H PRN   Or  oxyCODONE, 5 mg, Q4H PRN  sodium chloride, 1 Application, Q1H PRN       Continuous    insulin regular (HumuLIN R,NovoLIN R) 1 Units/mL in sodium chloride 0.9 % 100 mL infusion, 0.3-21 Units/hr, Last Rate: 6 Units/hr (03/08/24 0205)         Labs:    CBC    Recent Labs     03/06/24  0441 03/06/24  1108 03/07/24  0435 03/07/24  1328 03/07/24  1829   WBC 7.51  --  7.75  --   --    HGB 6.5*   < > 8.1* 8.6* 8.1*   HCT 21.5*   < > 25.0* 26.8*  --      --  250  --   --     < > = values in this interval not displayed.     BMP    Recent Labs     03/07/24  0435 03/07/24  1641   SODIUM 150* 147   K 2.7* 3.6   * 115*   CO2 27 24   AGAP 11 8   BUN 34* 32*   CREATININE 0.48* 0.55*   CALCIUM 8.6 8.0*       Coags    No recent results     Additional Electrolytes  Recent  Labs     03/06/24  0441 03/06/24  1419 03/07/24  0435   MG 2.1  --  2.2   PHOS  --   --  2.5*   CAIONIZED  --  1.29  --           Blood Gas    Recent Labs     03/06/24  1227   PHART 7.483*   [id number] 33.4*   PO2ART 46.4*   [id number] 24.5   BEART 1.3   SOURCE Radial, Right     Recent Labs     03/06/24  1227   SOURCE Radial, Right    LFTs  No recent results    Infectious  No recent results  Glucose  Recent Labs     03/06/24  0441 03/07/24  0435 03/07/24  1641   GLUC 206* 141* 329*               Nito Oliveros MD

## 2024-03-08 NOTE — PLAN OF CARE
Problem: Prexisting or High Potential for Compromised Skin Integrity  Goal: Skin integrity is maintained or improved  Description: INTERVENTIONS:  - Identify patients at risk for skin breakdown  - Assess and monitor skin integrity  - Assess and monitor nutrition and hydration status  - Monitor labs   - Assess for incontinence   - Turn and reposition patient  - Assist with mobility/ambulation  - Relieve pressure over bony prominences  - Avoid friction and shearing  - Provide appropriate hygiene as needed including keeping skin clean and dry  - Evaluate need for skin moisturizer/barrier cream  - Collaborate with interdisciplinary team   - Patient/family teaching  - Consider wound care consult   Outcome: Progressing     Problem: INFECTION - ADULT  Goal: Absence or prevention of progression during hospitalization  Description: INTERVENTIONS:  - Assess and monitor for signs and symptoms of infection  - Monitor lab/diagnostic results  - Monitor all insertion sites, i.e. indwelling lines, tubes, and drains  - Monitor endotracheal if appropriate and nasal secretions for changes in amount and color  - [place] appropriate cooling/warming therapies per order  - Administer medications as ordered  - Instruct and encourage patient and family to use good hand hygiene technique  - Identify and instruct in appropriate isolation precautions for identified infection/condition  Outcome: Progressing     Problem: SAFETY ADULT  Goal: Patient will remain free of falls  Description: INTERVENTIONS:  - Educate patient/family on patient safety including physical limitations  - Instruct patient to call for assistance with activity   - Consult OT/PT to assist with strengthening/mobility   - Keep Call bell within reach  - Keep bed low and locked with side rails adjusted as appropriate  - Keep care items and personal belongings within reach  - Initiate and maintain comfort rounds  - Make Fall Risk Sign visible to staff  - Offer Toileting every  2 Hours, in advance of need  - Initiate/Maintain bed alarm  - Obtain necessary fall risk management equipment: non skid footwear  - Apply yellow socks and bracelet for high fall risk patients  - Consider moving patient to room near nurses station  Outcome: Progressing     Problem: RESPIRATORY - ADULT  Goal: Achieves optimal ventilation and oxygenation  Description: INTERVENTIONS:  - Assess for changes in respiratory status  - Assess for changes in mentation and behavior  - Position to facilitate oxygenation and minimize respiratory effort  - Oxygen administered by appropriate delivery if ordered  - Initiate smoking cessation education as indicated  - Encourage broncho-pulmonary hygiene including cough, deep breathe, Incentive Spirometry  - Assess the need for suctioning and aspirate as needed  - Assess and instruct to report SOB or any respiratory difficulty  - Respiratory Therapy support as indicated  Outcome: Progressing     Problem: SKIN/TISSUE INTEGRITY - ADULT  Goal: Skin Integrity remains intact(Skin Breakdown Prevention)  Description: Assess:  -Perform Flavio assessment every shift   -Clean and moisturize skin every day   -Inspect skin when repositioning, toileting, and assisting with ADLS  -Assess under medical devices such as ronny  every hour   -Assess extremities for adequate circulation and sensation     Bed Management:  -Have minimal linens on bed & keep smooth, unwrinkled  -Change linens as needed when moist or perspiring  -Avoid sitting or lying in one position for more than 2 hours while in bed  -Keep HOB at 45 degrees     Toileting:  -Offer bedside commode  -Assess for incontinence every hour  -Use incontinent care products after each incontinent episode such as moisture barrier cream     Activity:  -Mobilize patient 3 times a day  -Encourage activity and walks on unit  -Encourage or provide ROM exercises   -Turn and reposition patient every 2 Hours  -Use appropriate equipment to lift or move  patient in bed  -Instruct/ Assist with weight shifting every hour when out of bed in chair  -Consider limitation of chair time 2 hour intervals    Skin Care:  -Avoid use of baby powder, tape, friction and shearing, hot water or constrictive clothing  -Relieve pressure over bony prominences using allevyn   -Do not massage red bony areas    Next Steps:  -Teach patient strategies to minimize risks such as weight shifting    -Consider consults to  interdisciplinary teams such as PT  Outcome: Progressing     Problem: SKIN/TISSUE INTEGRITY - ADULT  Goal: Incision(s), wounds(s) or drain site(s) healing without S/S of infection  Description: INTERVENTIONS  - Assess and document dressing, incision, wound bed, drain sites and surrounding tissue  - Provide patient and family education  - Perform skin care/dressing changes every bid    Outcome: Progressing     Problem: SKIN/TISSUE INTEGRITY - ADULT  Goal: Pressure injury heals and does not worsen  Description: Interventions:  - Implement low air loss mattress or specialty surface (Criteria met)  - Apply silicone foam dressing  - Instruct/assist with weight shifting every 2 minutes when in chair   - Limit chair time to 4 hour intervals  - Use special pressure reducing interventions such as pilow when in chair   - Apply fecal or urinary incontinence containment device   - Perform passive or active ROM every 4  - Turn and reposition patient & offload bony prominences every 2 hours   - Utilize friction reducing device or surface for transfers     - Consider nutrition services referral as needed  Outcome: Progressing

## 2024-03-08 NOTE — CONSULTS
Vancomycin IV Pharmacy-to-Dose Consultation    Carla Hunt is a 59 y.o. female who was receiving Vancomycin IV with management by the Pharmacy Consult service for treatment of Other Ostomy infection.       The patient’s Vancomycin therapy has been discontinued. Thank you for allowing us to take part in this patient's care. Pharmacy will sign-off now; please call or re-consult if there are any questions.        Gabe Henriquez, PharmD, BCCCP  Critical Care Clinical Pharmacist  Available via [employer]

## 2024-03-09 LAB
ANION GAP SERPL CALCULATED.3IONS-SCNC: 11 MMOL/L
ANION GAP SERPL CALCULATED.3IONS-SCNC: 15 MMOL/L
ANION GAP SERPL CALCULATED.3IONS-SCNC: 9 MMOL/L
ANISOCYTOSIS BLD QL SMEAR: PRESENT
BASOPHILS # BLD MANUAL: 0 THOUSAND/UL (ref 0–0.1)
BASOPHILS NFR MAR MANUAL: 0 % (ref 0–1)
BUN SERPL-MCNC: 29 MG/DL (ref 5–25)
BUN SERPL-MCNC: 33 MG/DL (ref 5–25)
BUN SERPL-MCNC: 34 MG/DL (ref 5–25)
CALCIUM SERPL-MCNC: 8.2 MG/DL (ref 8.4–10.2)
CALCIUM SERPL-MCNC: 8.4 MG/DL (ref 8.4–10.2)
CALCIUM SERPL-MCNC: 8.5 MG/DL (ref 8.4–10.2)
CARDIAC TROPONIN I PNL SERPL HS: 60 NG/L
CHLORIDE SERPL-SCNC: 110 MMOL/L (ref 96–108)
CHLORIDE SERPL-SCNC: 111 MMOL/L (ref 96–108)
CHLORIDE SERPL-SCNC: 113 MMOL/L (ref 96–108)
CO2 SERPL-SCNC: 18 MMOL/L (ref 21–32)
CO2 SERPL-SCNC: 24 MMOL/L (ref 21–32)
CO2 SERPL-SCNC: 24 MMOL/L (ref 21–32)
CREAT SERPL-MCNC: 0.52 MG/DL (ref 0.6–1.3)
CREAT SERPL-MCNC: 0.54 MG/DL (ref 0.6–1.3)
CREAT SERPL-MCNC: 0.57 MG/DL (ref 0.6–1.3)
CRP SERPL QL: 14.8 MG/L
EOSINOPHIL # BLD MANUAL: 0 THOUSAND/UL (ref 0–0.4)
EOSINOPHIL NFR BLD MANUAL: 0 % (ref 0–6)
ERYTHROCYTE [DISTWIDTH] IN BLOOD BY AUTOMATED COUNT: 20.3 % (ref 11.6–15.1)
ERYTHROCYTE [DISTWIDTH] IN BLOOD BY AUTOMATED COUNT: 20.3 % (ref 11.6–15.1)
GFR SERPL CREATININE-BSD FRML MDRD: 102 ML/MIN/1.73SQ M
GFR SERPL CREATININE-BSD FRML MDRD: 104 ML/MIN/1.73SQ M
GFR SERPL CREATININE-BSD FRML MDRD: 105 ML/MIN/1.73SQ M
GIANT PLATELETS BLD QL SMEAR: PRESENT
GLUCOSE SERPL-MCNC: 105 MG/DL (ref 65–140)
GLUCOSE SERPL-MCNC: 123 MG/DL (ref 65–140)
GLUCOSE SERPL-MCNC: 133 MG/DL (ref 65–140)
GLUCOSE SERPL-MCNC: 139 MG/DL (ref 65–140)
GLUCOSE SERPL-MCNC: 143 MG/DL (ref 65–140)
GLUCOSE SERPL-MCNC: 151 MG/DL (ref 65–140)
GLUCOSE SERPL-MCNC: 154 MG/DL (ref 65–140)
GLUCOSE SERPL-MCNC: 158 MG/DL (ref 65–140)
GLUCOSE SERPL-MCNC: 168 MG/DL (ref 65–140)
GLUCOSE SERPL-MCNC: 181 MG/DL (ref 65–140)
GLUCOSE SERPL-MCNC: 184 MG/DL (ref 65–140)
GLUCOSE SERPL-MCNC: 184 MG/DL (ref 65–140)
GLUCOSE SERPL-MCNC: 185 MG/DL (ref 65–140)
GLUCOSE SERPL-MCNC: 187 MG/DL (ref 65–140)
GLUCOSE SERPL-MCNC: 210 MG/DL (ref 65–140)
HCT VFR BLD AUTO: 27.8 % (ref 34.8–46.1)
HCT VFR BLD AUTO: 27.8 % (ref 34.8–46.1)
HGB BLD-MCNC: 8.7 G/DL (ref 11.5–15.4)
HGB BLD-MCNC: 8.7 G/DL (ref 11.5–15.4)
LYMPHOCYTES # BLD AUTO: 0 % (ref 14–44)
LYMPHOCYTES # BLD AUTO: 0.22 THOUSAND/UL (ref 0.6–4.47)
MAGNESIUM SERPL-MCNC: 2.2 MG/DL (ref 1.9–2.7)
MCH RBC QN AUTO: 30 PG (ref 26.8–34.3)
MCH RBC QN AUTO: 30 PG (ref 26.8–34.3)
MCHC RBC AUTO-ENTMCNC: 31.3 G/DL (ref 31.4–37.4)
MCHC RBC AUTO-ENTMCNC: 31.3 G/DL (ref 31.4–37.4)
MCV RBC AUTO: 96 FL (ref 82–98)
MCV RBC AUTO: 96 FL (ref 82–98)
METAMYELOCYTES NFR BLD MANUAL: 1 % (ref 0–1)
MICROCYTES BLD QL AUTO: PRESENT
MONOCYTES # BLD AUTO: 0.22 THOUSAND/UL (ref 0–1.22)
MONOCYTES NFR BLD: 1 % (ref 4–12)
NEUTROPHILS # BLD MANUAL: 21.33 THOUSAND/UL (ref 1.85–7.62)
NEUTS BAND NFR BLD MANUAL: 2 % (ref 0–8)
NEUTS SEG NFR BLD AUTO: 95 % (ref 43–75)
NRBC BLD AUTO-RTO: 5 /100 WBC (ref 0–2)
PLATELET # BLD AUTO: 419 THOUSANDS/UL (ref 149–390)
PLATELET # BLD AUTO: 419 THOUSANDS/UL (ref 149–390)
PLATELET BLD QL SMEAR: ABNORMAL
PMV BLD AUTO: 12.4 FL (ref 8.9–12.7)
PMV BLD AUTO: 12.4 FL (ref 8.9–12.7)
POLYCHROMASIA BLD QL SMEAR: PRESENT
POTASSIUM SERPL-SCNC: 3.4 MMOL/L (ref 3.5–5.3)
POTASSIUM SERPL-SCNC: 3.8 MMOL/L (ref 3.5–5.3)
POTASSIUM SERPL-SCNC: 4.2 MMOL/L (ref 3.5–5.3)
RBC # BLD AUTO: 2.9 MILLION/UL (ref 3.81–5.12)
RBC # BLD AUTO: 2.9 MILLION/UL (ref 3.81–5.12)
RBC MORPH BLD: PRESENT
SODIUM SERPL-SCNC: 144 MMOL/L (ref 135–147)
SODIUM SERPL-SCNC: 145 MMOL/L (ref 135–147)
SODIUM SERPL-SCNC: 146 MMOL/L (ref 135–147)
VARIANT LYMPHS # BLD AUTO: 1 %
WBC # BLD AUTO: 21.99 THOUSAND/UL (ref 4.31–10.16)
WBC # BLD AUTO: 21.99 THOUSAND/UL (ref 4.31–10.16)

## 2024-03-09 PROCEDURE — 84484 ASSAY OF TROPONIN QUANT: CPT | Performed by: STUDENT IN AN ORGANIZED HEALTH CARE EDUCATION/TRAINING PROGRAM

## 2024-03-09 PROCEDURE — 85027 COMPLETE CBC AUTOMATED: CPT | Performed by: INTERNAL MEDICINE

## 2024-03-09 PROCEDURE — 99233 SBSQ HOSP IP/OBS HIGH 50: CPT | Performed by: INTERNAL MEDICINE

## 2024-03-09 PROCEDURE — 94640 AIRWAY INHALATION TREATMENT: CPT

## 2024-03-09 PROCEDURE — 93005 ELECTROCARDIOGRAM TRACING: CPT

## 2024-03-09 PROCEDURE — 80048 BASIC METABOLIC PNL TOTAL CA: CPT | Performed by: STUDENT IN AN ORGANIZED HEALTH CARE EDUCATION/TRAINING PROGRAM

## 2024-03-09 PROCEDURE — 85007 BL SMEAR W/DIFF WBC COUNT: CPT | Performed by: INTERNAL MEDICINE

## 2024-03-09 PROCEDURE — 92526 ORAL FUNCTION THERAPY: CPT

## 2024-03-09 PROCEDURE — 86140 C-REACTIVE PROTEIN: CPT | Performed by: STUDENT IN AN ORGANIZED HEALTH CARE EDUCATION/TRAINING PROGRAM

## 2024-03-09 PROCEDURE — 82948 REAGENT STRIP/BLOOD GLUCOSE: CPT

## 2024-03-09 PROCEDURE — 94664 DEMO&/EVAL PT USE INHALER: CPT

## 2024-03-09 PROCEDURE — 83735 ASSAY OF MAGNESIUM: CPT | Performed by: STUDENT IN AN ORGANIZED HEALTH CARE EDUCATION/TRAINING PROGRAM

## 2024-03-09 PROCEDURE — 94760 N-INVAS EAR/PLS OXIMETRY 1: CPT

## 2024-03-09 RX ORDER — POTASSIUM CHLORIDE 20MEQ/15ML
40 LIQUID (ML) ORAL ONCE
Status: COMPLETED | OUTPATIENT
Start: 2024-03-09 | End: 2024-03-09

## 2024-03-09 RX ORDER — LORAZEPAM 0.5 MG/1
0.5 TABLET ORAL ONCE
Status: COMPLETED | OUTPATIENT
Start: 2024-03-09 | End: 2024-03-09

## 2024-03-09 RX ORDER — FUROSEMIDE 10 MG/ML
40 INJECTION INTRAMUSCULAR; INTRAVENOUS ONCE
Status: COMPLETED | OUTPATIENT
Start: 2024-03-09 | End: 2024-03-09

## 2024-03-09 RX ORDER — METHYLPREDNISOLONE SODIUM SUCCINATE 125 MG/2ML
60 INJECTION, POWDER, LYOPHILIZED, FOR SOLUTION INTRAMUSCULAR; INTRAVENOUS EVERY 8 HOURS SCHEDULED
Status: DISCONTINUED | OUTPATIENT
Start: 2024-03-09 | End: 2024-03-11

## 2024-03-09 RX ORDER — METHYLPREDNISOLONE SODIUM SUCCINATE 40 MG/ML
10 INJECTION, POWDER, LYOPHILIZED, FOR SOLUTION INTRAMUSCULAR; INTRAVENOUS ONCE
Status: COMPLETED | OUTPATIENT
Start: 2024-03-09 | End: 2024-03-09

## 2024-03-09 RX ADMIN — POTASSIUM CHLORIDE 40 MEQ: 1.5 SOLUTION ORAL at 17:42

## 2024-03-09 RX ADMIN — LAMOTRIGINE 150 MG: 100 TABLET ORAL at 08:56

## 2024-03-09 RX ADMIN — OXYCODONE HYDROCHLORIDE 5 MG: 5 SOLUTION ORAL at 08:28

## 2024-03-09 RX ADMIN — FUROSEMIDE 40 MG: 10 INJECTION, SOLUTION INTRAMUSCULAR; INTRAVENOUS at 17:43

## 2024-03-09 RX ADMIN — POLYETHYLENE GLYCOL 3350 17 G: 17 POWDER, FOR SOLUTION ORAL at 08:56

## 2024-03-09 RX ADMIN — Medication 2 SPRAY: at 14:46

## 2024-03-09 RX ADMIN — METHYLPREDNISOLONE SODIUM SUCCINATE 60 MG: 125 INJECTION, POWDER, FOR SOLUTION INTRAMUSCULAR; INTRAVENOUS at 21:48

## 2024-03-09 RX ADMIN — METHYLPREDNISOLONE SODIUM SUCCINATE 50 MG: 125 INJECTION, POWDER, FOR SOLUTION INTRAMUSCULAR; INTRAVENOUS at 05:37

## 2024-03-09 RX ADMIN — TOPIRAMATE 100 MG: 100 TABLET, FILM COATED ORAL at 09:20

## 2024-03-09 RX ADMIN — NYSTATIN: 100000 POWDER TOPICAL at 17:56

## 2024-03-09 RX ADMIN — CHLORHEXIDINE GLUCONATE 0.12% ORAL RINSE 15 ML: 1.2 LIQUID ORAL at 08:50

## 2024-03-09 RX ADMIN — TOPIRAMATE 100 MG: 100 TABLET, FILM COATED ORAL at 18:37

## 2024-03-09 RX ADMIN — CEFEPIME 2000 MG: 2 INJECTION, POWDER, FOR SOLUTION INTRAVENOUS at 00:42

## 2024-03-09 RX ADMIN — FUROSEMIDE 40 MG: 10 INJECTION, SOLUTION INTRAMUSCULAR; INTRAVENOUS at 10:35

## 2024-03-09 RX ADMIN — SODIUM CHLORIDE 4 UNITS/HR: 9 INJECTION, SOLUTION INTRAVENOUS at 17:07

## 2024-03-09 RX ADMIN — OXYMETAZOLINE HYDROCHLORIDE 2 SPRAY: 0.05 SPRAY NASAL at 21:48

## 2024-03-09 RX ADMIN — NIRMATRELVIR AND RITONAVIR 3 TABLET: KIT at 20:04

## 2024-03-09 RX ADMIN — NYSTATIN: 100000 POWDER TOPICAL at 08:58

## 2024-03-09 RX ADMIN — METHYLPREDNISOLONE SODIUM SUCCINATE 60 MG: 125 INJECTION, POWDER, FOR SOLUTION INTRAMUSCULAR; INTRAVENOUS at 13:47

## 2024-03-09 RX ADMIN — Medication 2 SPRAY: at 17:56

## 2024-03-09 RX ADMIN — IPRATROPIUM BROMIDE 0.5 MG: 0.5 SOLUTION RESPIRATORY (INHALATION) at 19:06

## 2024-03-09 RX ADMIN — LORAZEPAM 0.5 MG: 0.5 TABLET ORAL at 10:35

## 2024-03-09 RX ADMIN — IPRATROPIUM BROMIDE 0.5 MG: 0.5 SOLUTION RESPIRATORY (INHALATION) at 07:30

## 2024-03-09 RX ADMIN — METRONIDAZOLE 500 MG: 500 TABLET ORAL at 13:47

## 2024-03-09 RX ADMIN — Medication 2 SPRAY: at 21:48

## 2024-03-09 RX ADMIN — METHYLPREDNISOLONE SODIUM SUCCINATE 10 MG: 40 INJECTION, POWDER, FOR SOLUTION INTRAMUSCULAR; INTRAVENOUS at 10:32

## 2024-03-09 RX ADMIN — POTASSIUM CHLORIDE 40 MEQ: 1.5 SOLUTION ORAL at 08:56

## 2024-03-09 RX ADMIN — ENOXAPARIN SODIUM 40 MG: 40 INJECTION SUBCUTANEOUS at 08:36

## 2024-03-09 RX ADMIN — LEVALBUTEROL HYDROCHLORIDE 1.25 MG: 1.25 SOLUTION RESPIRATORY (INHALATION) at 07:30

## 2024-03-09 RX ADMIN — SODIUM CHLORIDE 9 UNITS/HR: 9 INJECTION, SOLUTION INTRAVENOUS at 02:01

## 2024-03-09 RX ADMIN — LEVALBUTEROL HYDROCHLORIDE 1.25 MG: 1.25 SOLUTION RESPIRATORY (INHALATION) at 13:15

## 2024-03-09 RX ADMIN — POTASSIUM CHLORIDE 40 MEQ: 1.5 SOLUTION ORAL at 18:36

## 2024-03-09 RX ADMIN — CEFEPIME 2000 MG: 2 INJECTION, POWDER, FOR SOLUTION INTRAVENOUS at 08:56

## 2024-03-09 RX ADMIN — FAMOTIDINE 20 MG: 20 TABLET, FILM COATED ORAL at 17:43

## 2024-03-09 RX ADMIN — Medication 2 SPRAY: at 05:37

## 2024-03-09 RX ADMIN — CHLORHEXIDINE GLUCONATE 0.12% ORAL RINSE 15 ML: 1.2 LIQUID ORAL at 21:48

## 2024-03-09 RX ADMIN — IPRATROPIUM BROMIDE 0.5 MG: 0.5 SOLUTION RESPIRATORY (INHALATION) at 13:15

## 2024-03-09 RX ADMIN — OXYMETAZOLINE HYDROCHLORIDE 2 SPRAY: 0.05 SPRAY NASAL at 08:58

## 2024-03-09 RX ADMIN — LEVALBUTEROL HYDROCHLORIDE 1.25 MG: 1.25 SOLUTION RESPIRATORY (INHALATION) at 19:06

## 2024-03-09 RX ADMIN — NIRMATRELVIR AND RITONAVIR 3 TABLET: KIT at 12:43

## 2024-03-09 RX ADMIN — METRONIDAZOLE 500 MG: 500 TABLET ORAL at 21:48

## 2024-03-09 RX ADMIN — CEFEPIME 2000 MG: 2 INJECTION, POWDER, FOR SOLUTION INTRAVENOUS at 16:14

## 2024-03-09 RX ADMIN — FAMOTIDINE 20 MG: 20 TABLET, FILM COATED ORAL at 08:56

## 2024-03-09 RX ADMIN — Medication 2 SPRAY: at 10:33

## 2024-03-09 RX ADMIN — METRONIDAZOLE 500 MG: 500 TABLET ORAL at 05:37

## 2024-03-09 RX ADMIN — REMDESIVIR 100 MG: 100 INJECTION, POWDER, LYOPHILIZED, FOR SOLUTION INTRAVENOUS at 16:00

## 2024-03-09 RX ADMIN — LAMOTRIGINE 150 MG: 100 TABLET ORAL at 18:37

## 2024-03-09 NOTE — PROGRESS NOTES
[facility]  Progress Note  Name: Carla Hunt I  MRN: [id number]  Unit/Bed#: MICU 10 I Date of Admission: 1/10/2024   Date of Service: 3/8/2024 I Hospital Day: 59    Assessment/Plan   Goals of care, counseling/discussion  Assessment & Plan  Code Status: full - Level 1  Patient was extubated to BiPAP on 3/1. Now on HFNC. Goal to continue medical optimization. However, if re-intubated will likely need to readdress trach.  NGT remains in place while patient not appropriate for PO. Hopeful mental status improves allowing speech/swallow eval in the next 24H.   Otherwise, patient's  has mentioned important goal of patient experiencing daughter's wedding later this year if possible.     Palliative care patient  Assessment & Plan  PSC, including MSW support, will follow closely to continue to provide supportive care as clinical situation evolves.   Encouraged patient/family on ongoing participation with PT/OT/speech therapy  Decisional apparatus:  Patient is not competent on my exam today.  If competence is lost, patient's substitute decision maker would default to spouse by [place] 169.  Advance Directive / Living Will / POLST:  None on file, unable to complete  Will return on week of 3/11. Page sooner with questions or concerns.    Teto marginal zone B-cell lymphoma (HCC)  Assessment & Plan  Previously on maintenance therapy    * Acute respiratory failure with hypoxia (HCC)  Assessment & Plan  Patient was extubated after prolonged intubation and weaned down to 6L O2 via NC  Very careful steroid management per critical care team.  Patient remains full code. Patient's  understands if she is re-intubated will need to readdress trach.          Interval history:       No events overnight. Carla continues to have waxing and waning movements of wakefulness. This AM was quite lucid, but becoming more drowsy after a lot of wakefulness and communication with her  and  son at bedside this AM. She is resting comfortably during time of encounter.Son (Clinton) and  (Min) both at bedside, feeling hopeful based on Carla's slow but steady progress.     MEDICATIONS / ALLERGIES:     all current active meds have been reviewed    No Known Allergies    OBJECTIVE:    Physical Exam  Physical Exam  Constitutional:       General: She is not in acute distress.  HENT:      Head: Atraumatic.   Cardiovascular:      Rate and Rhythm: Normal rate.   Pulmonary:      Effort: No respiratory distress.      Comments: 6L O2 via NC  Abdominal:      Tenderness: There is no guarding.      Comments: NGT in place   Musculoskeletal:         General: No swelling.   Skin:     General: Skin is warm.   Neurological:      Comments: Opens eyes briefly during time of encounter without sustained wakefulness. However, was alert and interactive with staff/family earlier today per Spouse.         Lab Results:   Results from last 7 days   Lab Units 03/08/24  0541 03/07/24  1829 03/07/24  1328 03/07/24  0435 03/06/24  1108 03/06/24  0441 03/04/24  1044 03/04/24  0605   WBC Thousand/uL 18.10*  --   --  7.75  --  7.51   < > 16.57*   HEMOGLOBIN g/dL 8.5* 8.1* 8.6* 8.1*   < > 6.5*   < > 7.6*   HEMATOCRIT % 26.7*  --  26.8* 25.0*   < > 21.5*   < > 25.2*   PLATELETS Thousands/uL 434*  --   --  250  --  238   < > 251   MONO PCT %  --   --   --   --   --   --   --  1*   EOS PCT %  --   --   --   --   --   --   --  0    < > = values in this interval not displayed.     Results from last 7 days   Lab Units 03/08/24  1427 03/08/24  0541 03/07/24  1641 03/04/24  1828 03/04/24  0605 03/03/24  2027 03/03/24  0448 03/02/24  1832 03/02/24  0510   POTASSIUM mmol/L 3.4* 2.8* 3.6   < > 3.6   < > 3.4*   < > 3.7   CHLORIDE mmol/L 107 106 115*   < > 123*   < > 112*   < > 108   CO2 mmol/L 27 24 24   < > 28   < > 32   < > 29   BUN mg/dL 29* 31* 32*   < > 34*   < > 27*   < > 28*   CREATININE mg/dL 0.48* 0.56* 0.55*   < > 0.54*   < > 0.53*    < > 0.51*   CALCIUM mg/dL 8.3* 8.3* 8.0*   < > 10.1   < > 9.8   < > 10.1   ALK PHOS U/L  --   --   --   --  61  --  60  --  66   ALT U/L  --   --   --   --  17  --  19  --  27   AST U/L  --   --   --   --  8*  --  11*  --  20    < > = values in this interval not displayed.       Imaging Studies: reviewed pertinent studies   EKG, Pathology, and Other Studies: reviewed pertinent studies    Counseling / Coordination of Care    Total floor / unit time spent today 25 minutes. Greater than 50% of total time was spent with the patient and / or family counseling and / or coordination of care. A description of the counseling / coordination of care: time spent assessing patient, communicating with family at bedside,

## 2024-03-09 NOTE — PROGRESS NOTES
Critical Care Attending Note     59 years old with B-Cell lymphoma - completed rituximab in Dec 2023, seizure disorder, anxiety, CKD III, presented 1/7 for encephalopathy. Found to have COVID-19 infection.  Treated Remdesivir/Decadron/actemra, extensive neurologic workup negative except very small SAH (LP, imaging, vEEG).  She has had protracted course with repeated episodes of worsened hypoxic respiratory failure to include multiple bronchoscopies with treatment for possible drug induced or COVID pneumonitis and Stenotrophomonas.  She has previously clinically improved on steroid courses.  Returned ICU 2/1 - pulse dosed steroids 2/5-2/8 with further taper, required intubation 2/13 also with severe epistaxis.  IVIG course completed 2/15. Repeated Remdesivir course 2/20-2/25.  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  She was extubated 3/1.  She has not cleared COVID and resumed remdesivir and paxlovid.      24hr events - remained on midflow - had increased needs this am, reports she feels anxious, reported good pain control, no sputum production    VS AF, -120's, MAPS 's, SpO2 94% 13L midflow, I/Os +300cc  Exam  GEN middle aged woman, in bed, chronically ill and weak appearing, very weak voice  HEENT MMM, no thrush,no scleral icterus  NECK LIJ TLC in place w/o purulence  CV reg, single s1/2 no m/r  Pulm diminished at bases, mild tachypnea, weak cough, no wheeze or rales  ABD +BS soft ND, colostomy stoma noted - dark but with liquid stool output, no bleeding during my exam, no rebound  EXT warm, brisk cap refill, improving edema, 1+ LE strength, 2+ upper ext strength, improved shoulder shrug   marie in place yellow urine    Laboratory and Diagnostics  Results from last 7 days   Lab Units 03/09/24  0554 03/08/24  0541 03/07/24  1829 03/07/24  1328 03/07/24  0435 03/06/24  1108 03/06/24  0441 03/05/24  0458 03/04/24  1509 03/04/24  1044 03/04/24  0605   WBC  Thousand/uL 21.99* 18.10*  --   --  7.75  --  7.51 14.78*  --  17.10* 16.57*   HEMOGLOBIN g/dL 8.7* 8.5* 8.1* 8.6* 8.1* 8.8* 6.5* 7.5*   < > 7.5* 7.6*   HEMATOCRIT % 27.8* 26.7*  --  26.8* 25.0* 28.3* 21.5* 24.1*   < > 25.0* 25.2*   PLATELETS Thousands/uL 419* 434*  --   --  250  --  238 323  --  266 251   BANDS PCT %  --   --   --   --   --   --   --   --   --   --  2   MONO PCT %  --   --   --   --   --   --   --   --   --   --  1*   EOS PCT %  --   --   --   --   --   --   --   --   --   --  0    < > = values in this interval not displayed.     Results from last 7 days   Lab Units 03/09/24  0554 03/08/24  1427 03/08/24  0541 03/07/24  1641 03/07/24  0435 03/06/24  0441 03/05/24  1619 03/04/24  1828 03/04/24  0605 03/03/24 2027 03/03/24  0448   SODIUM mmol/L 145 144 143 147 150* 147 152*   < > 159*   < > 150*   POTASSIUM mmol/L 3.8 3.4* 2.8* 3.6 2.7* 3.3* 4.0   < > 3.6   < > 3.4*   CHLORIDE mmol/L 110* 107 106 115* 112* 111* 121*   < > 123*   < > 112*   CO2 mmol/L 24 27 24 24 27 26 24   < > 28   < > 32   ANION GAP mmol/L 11 10 13 8 11 10 7   < > 8   < > 6   BUN mg/dL 29* 29* 31* 32* 34* 31* 28*   < > 34*   < > 27*   CREATININE mg/dL 0.52* 0.48* 0.56* 0.55* 0.48* 0.63 0.63   < > 0.54*   < > 0.53*   CALCIUM mg/dL 8.4 8.3* 8.3* 8.0* 8.6 9.2 9.3   < > 10.1   < > 9.8   GLUCOSE RANDOM mg/dL 151* 80 191* 329* 141* 206* 187*   < > 179*   < > 255*   ALT U/L  --   --   --   --   --   --   --   --  17  --  19   AST U/L  --   --   --   --   --   --   --   --  8*  --  11*   ALK PHOS U/L  --   --   --   --   --   --   --   --  61  --  60   ALBUMIN g/dL  --   --   --   --   --   --   --   --  3.0*  --  3.0*   TOTAL BILIRUBIN mg/dL  --   --   --   --   --   --   --   --  0.53  --  0.81    < > = values in this interval not displayed.     Results from last 7 days   Lab Units 03/09/24  0554 03/08/24  1427 03/08/24  0541 03/07/24  0435 03/06/24  0441 03/05/24  0458 03/04/24  0605 03/03/24  0448   MAGNESIUM mg/dL 2.2 2.1 2.0 2.2 2.1  2.2 2.4 2.4   PHOSPHORUS mg/dL  --   --   --  2.5*  --   --  2.2* 3.1               Results from last 7 days   Lab Units 03/03/24  1234   LACTIC ACID mmol/L 1.3     Results from last 7 days   Lab Units 03/09/24  0554 03/08/24  0541 03/07/24  0435 03/06/24  0441 03/05/24  0458 03/04/24  0605 03/03/24  0448   FERRITIN ng/mL  --   --   --   --   --   --  974*   CRP mg/L 14.8* 36.9* 109.6* 172.3* 97.3* 215.2*  --              Results from last 7 days   Lab Units 03/04/24  0605   D-DIMER QUANTITATIVE ug/ml FEU 1.47*           ABG:   Results from last 7 days   Lab Units 03/06/24  1227   PH ART  7.483*   PCO2 ART mm Hg 33.4*   PO2 ART mm Hg 46.4*   HCO3 ART mmol/L 24.5   BASE EXC ART mmol/L 1.3   ABG SOURCE  Radial, Right     MICRO  3/5 MRSA neg  COVID PCR (+) 3/3, FLU/RSV neg  Bld CX 2/26 - NGTD  COVID PCR 2/21 - indeterminant   Bronch BAL 2/21 4+ candida, few colonies MDR Pseudomonas  MRSA neg 2/18  BAL COVID (+) 2/16  PCP DFA neg 2/16  Sputum 2/9 - Stenotrophomonas      RADIOGRAPHS - images personally reviewed  CXR 3/9 - T/L good position, bilateral alveolar infiltrates with some noted improvements on right, no PTX    CXR 3/7 - T/L good position, no PTX, R>L alveolar infiltrates    CT C/A/P 3/6 - increased ground glass infiltrates and scattered consolidations, edema and SQ qamar around RLQ ostomy site    CT head 3/6 - no acute mass/bleed/shift, post operative changes left temporal lobectomy, AFL in sinuses    CXR 2/28 - T/L good position, R>L alveolar infiltrates, no PTX, no dense consolidation     Upper Ext US 2/26 - neg for DVT, (+) superficial thrombophlebitis b/l     CT angio 2/26 - no PE, persistent bilateral ground glass infiltrates with some mild improvement from priori scans, bibasilar consolidations, no sig effusions, small ascites, no new intra-abdominal pathology     TTE 2/2024 - EF 70%, grade I diastolic dysfunction     Assessment  Acute hypoxic respiratory failure - extubated 3/1  Abnormal CT chest -  persistent COVID pneumonitis in setting of rituximab less likely autoimmune pneumonitis given negative serology testing  Stenotrophomonas/Pseudomonas pneumonia   Shock - mixed septic, hypovolemic - resolved  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 - noted stomal retraction/necrosis and cellulitis   Sinusitis   Toxic/metabolic encephalopathy with likely delirium component and anxiety  Thrombocytopenia, anemia   B-Cell lymphoma  Minimal SAH POA  Seizure disorder  Steroid induced hyperglycemia  Elevated TG  Hypernatremia - resolved  Hypokalemia  CKD, resolved ELIESER  Upper ext thrombophlebitis   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     PLAN  NEURO - continue topamax and lamictal, resume effexor, prn oxycodone, prn olanzapine, monitor for w/d symptoms, trial low dose oral ativan  CARDIAC -  Goal MAP >65, remains off vasopressors, daily EKG QTc 449  PULM - continue to wean midflow NC, remains on 60mg solumedrol TID for now - goal to wean steroids, attempt further diuresis goal negative 500cc today  GI - close stoma monitoring - appreciate surgery assistance, tolerating TFs with Vital 1.5  RENAL - attempt further diuresis - goal negative 500-1000cc today, replete electrolytes  ID - plan to complete repeat 10 day paxlovid and remdesivir, ID monitoring cycle times, continue cefepime/vanc/flagyl per ID for stomal cellulitis - trend WBC noted no sig bandemia   ENDO - continue insulin gtt  HEME - B cell lymphoma, no active treatments, trend Hgb and plts  ICU Care - continue SCDs/LMWH, CHG, HOB >3deg, continue pepcid  Needs aggressive PT/OT to improve myopathy  D/W nursing possible PIV access to remove CVC  Consider marie removal  I updated her  at bedside and all questions answered    Juvencio Kemp, DO

## 2024-03-09 NOTE — QUICK NOTE
General Surgery Quick Note:    ICU team requested ostomy evaluation. Patient seen and examined bedside with patient's spouse. Discussed with nursing. Leaking noted to ostomy appliance. Appliance taken down and ostomy examined. No bleeding noted. Light brown semi-formed stool productive from ostomy. New two piece ostomy appliance placed. Continue expectant management. Continue two piece appliance to avoid skin irritation.

## 2024-03-09 NOTE — SPEECH THERAPY NOTE
Speech Language/Pathology    Speech/Language Pathology Progress Note    Patient Name: Carla Hunt  Today's Date: 3/9/2024     Problem List  Principal Problem:    Acute respiratory failure with hypoxia (HCC)  Active Problems:    Anxiety state    COVID    Stage 3b chronic kidney disease (CKD) (HCC)    Teto marginal zone B-cell lymphoma (HCC)    Seizure disorder (HCC)    Hypotension    Palliative care patient    Goals of care, counseling/discussion    Acute kidney injury (HCC)    Cecal volvulus (HCC)       Past Medical History  Past Medical History:   Diagnosis Date    Hx of hypercalcemia     Lymphoma (HCC) 2021    Parathyroid disease (HCC)     Seizures (HCC)     Situational depression     24 yr old son  from drug overdose        Past Surgical History  Past Surgical History:   Procedure Laterality Date    CRANIOTOMY FOR TEMPORAL LOBECTOMY Left     VA LAPS ABD PRTM&OMENTUM DX W/WO SPEC BR/WA SPX N/A 2024    Procedure: LAPAROSCOPY DIAGNOSTIC,EXPLORATORY LAPAROTOMY, RIGHT KARY COLECTOMY,ILEOSTOMY, MUCUS FISTULA;  Surgeon: Lauryn Ullrich, DO;  Location: [facility];  Service: General     Subjective:  Patient is awake and alert. Patient becomes fatigued by the end of the session.     Objective:  Patient is seen for dysphagia therapy. She is observed with aphonia, despite many attempts to achieve voice. She continues to be SOB with shallow breathing during session. Patient is accompanied by family at bedside. She is assessed with honey thick liquids and ice chips. Retrieval is min impaired with reduced lip closure around tsp. Transfer is prolonged. Multiple swallows observed. Patient has weak immediate cough x2 with honey thick liquids. O2 drops from 94 to 90 during PO trials. Patient given small ice chip. Appears to have reduced oral control, with risk of spillage over BOT prior to swallow. Oral suction is used to remove from oral cavity. Cough is observed while patient is manipulating ice chip.      Assessment:  Patient is unable to continuously tolerate PO trials of honey thick liquids.     Plan/Recommendations:  Continue NPO with TF for nutrition and medication. Continue ST to assess tolerance of PO trials. D/w patient, family and RN.

## 2024-03-09 NOTE — PROGRESS NOTES
[facility]  Progress Note: Critical Care  Name: Carla Hunt 59 y.o. female I MRN: [id number]  Unit/Bed#: MICU 10 I Date of Admission: 1/10/2024   Date of Service: 3/9/2024 I Hospital Day: 59    Assessment/Plan     Acute hypoxic respiratory failure, on MFNC  Critical illness polyneuropathy and weakness  Radiographic findings concerning for COVID induced pneumonitis and possible bacterial pneumonia  Cellulitis surrounding ostomy site  Stenotrophomonas pneumonia s/p 14 days of antibiotic therapy  Bronchial secretions with MDR pseudomonas status post antibiotic therapy  Partial cecal volvulus with SBO status post right hemicolectomy and ostomy pouch  Anemia requiring transfusion  B-cell lymphoma with history of treatment with Rituxan  Minimal SAH with no neurosurgical intervention indicated at the time  Seizure disorder s/p left temporal lobe partial resection  Steroid-induced hyperglycemia  Sacral ulcers bilaterally     Neuro:   Sedation: Off sedation  -Monitor for withdrawal in context of patients history of seizures     Diagnosis: Analgesia  -On oxycodone 2.5 mg and 5 mg every 4 hours PRN     Diagnosis: seizure disorder  -S/p partial left temporal lobe resection in 2007  -On Lamictal 150 bid and Topamax 100 bid  -Last lamotrigine level from 03/02 at 10.7 (therapeutic)     Diagnosis: Anxiety  -Venlafaxine on hold for now due to changes in cognition/mentation currently, can be restarted once patient clinically improved     CV:   Diagnosis: Distributive shock, resolved  -Off pressors  -Maintain MAPs > 65 mmHg     Pulm:  Diagnosis: Acute hypoxic respiratory failure due to severe covid  and covid induced fibrosis  -Completed severe COVID pathway and 7 days CTX initially. Then completed 5 day course of remdesevir in late February for Covid19 positive PCR from bronchoscopic cultures. Now covid positive with antigen testing  -S/p Bronch 2/2, 02/16, 02/21  -PCP and AFB negative    -Legionella, viral, fungal cultures no growth to date  -Pulse dose steroids with solumedrol 1g daily x3 days (completed 2/7) then transitioned to prednisone 80mg daily on 2/8. Also completed veletri treatment and 5 day course of IVIG.   -CT scan 2/24: general improvement of areas of consolidation and some areas of groundglass opacification on the lungs, still with significant groundglass opacification especially in the lower lobes. Bilateral consolidations in lower lobes. No evidence of PE.  -Extubated 03/01. Was on bipap, then on HFNC and now MFNC.  -Currently on solumedrol  50 mg Q8H  -Planning for CRP daily  -CRP trend   172.3 (03/06) > 109.6 (03/07) > 36.9 (03/08) > 14.8 (03/09)     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  -Monitor output of ostomy pouch and Hemoglobin  -Stoma appearing slightly retracted, still having some output  -Surgery following, will keep them updated if any further changes  -Monitor ostomy output  -Tube feeds running     -On famotidine 20 mg QD for GI prophylaxis and to reduce risk of upper GI bleed in this patient on significant amount of steroid regimen     :   Diagnosis: CKD III  -Baseline Cr appears to be 0.8-1.2  -UA unremarkable   -Upon chart review 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) though unclear how this was diagnosed, this also does not enirely fit as she is NOT hypokalemic  -Continue to monitor I/O and UOP     Diagnosis: Hypernatremia, resolved, currently at 145  -Continue on free water flushes     Diagnosis: Acute kidney injury, sCr at 0.55, resolved     F/E/N:   F: Off IVF  E: monitor and replete for goal K>4, P>3, Mg>2  N: On tube feeds with vital 1.5 with free water flushes 200 mL every 6 hours     Heme/Onc:   Diagnosis: Anemia  Likely multifactorial in nature, acute in the setting of recent sepsis/inflammatory state complicated by chronic anemia due to her other history of lymphoma and  CKD  -She did receive 1 unit transfusion on 02/29, another unit on 03/06  -Iron panel with ferritin 974, iron 10, TIBC 173     Diagnosis: Thrombocytopenia, resolved.   -Will continue to monitor  -Monitor sites for bleeding     Diagnosis: B cell lymphoma  -Follows with heme/onc at [facility]  -On rituxan for 2 year course, started May 2022  -Last dose was 12/20, treatment currently on hold   -Leukopenic on admission but WBC now wnl     DVT ppx with lovenox     Endo:   Diagnosis: steroid hyperglycemia and diabetes mellitus type 2, A1c at 6.6 from 01/2024  -Goal -180  -BG range last 24hrs 105-184  -On insulin drip     ID:   Diagnosis: Severe covid pneumonia and stenotrophomonas pneumonia  -Completed severe COVID pathway with decadron (ended 1/22) and remdesivir (ended 1/20), also completed 7 days CTX on 1/15  -C/f opportunistic infections given immunocompromised status on chemotherapy and recent steroid use  -No consolidations on CXR and procal negative  -S/p bactrim and minocycline x5 days for stenotrophomonas on sputum culture (1/25), then on bactrim for PJP ppx  -Bronc on 2/2 - Cx w/o growth (initially resulted as 1+ alpha hemolytic strep), AFB & PCP negative, f/u fungal, legionella, and viral cultures   -UA with moderate leukocytes, nitrite negative, 30-50 WBC, trace protein.  -Repeat sputum culture 2/9 with 4+ stenotrophomonas  -Bronch cultures with candida albicans, Rare gram positive cocci in pairs  -Previously completed 14 day course of antibiotics for stenotrophomonas pneumonia  -Levaquin continued for an additional 7 days since bronchial cultures with pseudomonas MDR susceptible to levaquin. Levaquin discontinued at this point since patient has likely completed >7 days of therapy for pseudomonas susceptible to antibiotics.  -RSV/Flu/Covid panel positive, we are using that to assess the amount of covid viral particle shedding quantitatively as we continue to treat her with remdesevir and paxlovid  -On cefepime  to cover treatment for stoma-wall cellulitis and possible pneumonia, day 5 today. On Flagyl for anaerobic coerage 500 mg Q8H started on 03/08. Off vancomycin.   -On remdesecir and paxlovid for 14 day course, plan for Monday morning PCR test as recommended by ID     MSK/Skin:   -Wound care team following for bilateral sacral wounds       Disposition: Critical care    ICU Core Measures     A: Assess, Prevent, and Manage Pain Has pain been assessed? Yes  Need for changes to pain regimen? No   B: Both SAT/SAT  N/A   C: Choice of Sedation RASS Goal: N/A patient not on sedation  Need for changes to sedation or analgesia regimen? NA   D: Delirium CAM-ICU: Negative   E: Early Mobility  Plan for early mobility? Yes   F: Family Engagement Plan for family engagement today? Yes       Antibiotic Review: Patient on appropriate coverage based on culture data.     Review of Invasive Devices:    [place] Plan: Continue for accurate I/O monitoring for 48 hours  Central access plan: Medications requiring central line      Prophylaxis:  VTE VTE covered by:  enoxaparin, Subcutaneous, 40 mg at 03/08/24 0907       Stress Ulcer  covered byfamotidine (PEPCID) tablet 20 mg [[id number]]        Significant 24hr Events     24hr events: Overnight, patient remained on 6-8L midflow with saturations around 91-95%.      Subjective       Review of Systems   Respiratory:  Negative for shortness of breath.    Gastrointestinal:  Negative for abdominal pain, nausea and vomiting.        Objective                            Vitals I/O      Most Recent Min/Max in 24hrs   Temp 98.5 °F (36.9 °C) Temp  Min: 98 °F (36.7 °C)  Max: 100.5 °F (38.1 °C)   Pulse (!) 116 Pulse  Min: 83  Max: 140   Resp (!) 28 Resp  Min: 16  Max: 33   /76 BP  Min: 112/72  Max: 154/76   O2 Sat 91 % SpO2  Min: 86 %  Max: 100 %      Intake/Output Summary (Last 24 hours) at 3/9/2024 0703  Last data filed at 3/9/2024 0600  Gross per 24 hour   Intake 2876.65 ml   Output 2575 ml   Net  301.65 ml       Diet Enteral/Parenteral; Tube Feeding No Oral Diet; Vital 1.5; Continuous; 50; Prosource Protein Liquid - One Packet; 200; Water; Every 6 hours    Invasive Monitoring           Physical Exam   Physical Exam  Vitals reviewed.   Skin:     General: Skin is warm.      Capillary Refill: Capillary refill takes less than 2 seconds.   HENT:      Head: Normocephalic.   Cardiovascular:      Rate and Rhythm: Normal rate and regular rhythm.      Pulses: Normal pulses.   Abdominal: General: Bowel sounds are normal.      Palpations: Abdomen is soft.      Comments: Ostomy pouch noted   Constitutional:       Comments: Has a LIJ CVC, marie noted. NG tube noted. On midflow NC.    Pulmonary:      Effort: Pulmonary effort is normal.      Breath sounds: Normal breath sounds.   Neurological:      Mental Status: She is alert.      Comments: Able to follow simple commands, able to wake up on verbal stimuli.   Genitourinary/Anorectal:  Marie present.          Diagnostic Studies      EKG: Reviewed  Imaging: Reviewed I have personally reviewed pertinent reports.       Medications:  Scheduled PRN   cefepime, 2,000 mg, Q8H  chlorhexidine, 15 mL, Q12H SARTHAK  enoxaparin, 40 mg, Q24H SARTHAK  famotidine, 20 mg, BID  ipratropium, 0.5 mg, TID  lamoTRIgine, 150 mg, BID  levalbuterol, 1.25 mg, TID  methylPREDNISolone sodium succinate, 50 mg, Q8H SARTHAK  metroNIDAZOLE, 500 mg, Q8H SARTHAK  nirmatrelvir & ritonavir, 3 tablet, BID  nystatin, , BID  oxymetazoline, 2 spray, BID  polyethylene glycol, 17 g, Daily  potassium chloride, 40 mEq, Once  remdesivir, 100 mg, Q24H  sodium chloride, 2 spray, Q4H  topiramate, 100 mg, BID      acetaminophen, 650 mg, Q6H PRN  hydrALAZINE, 10 mg, Q6H PRN  OLANZapine, 10 mg, HS PRN  ondansetron, 4 mg, Q6H PRN  oxyCODONE, 2.5 mg, Q4H PRN   Or  oxyCODONE, 5 mg, Q4H PRN  sodium chloride, 1 Application, Q1H PRN       Continuous    insulin regular (HumuLIN R,NovoLIN R) 1 Units/mL in sodium chloride 0.9 % 100 mL infusion,  0.3-21 Units/hr, Last Rate: 6 Units/hr (03/09/24 0556)         Labs:    CBC    Recent Labs     03/07/24  1328 03/07/24  1829 03/08/24  0541   WBC  --   --  18.10*   HGB 8.6* 8.1* 8.5*   HCT 26.8*  --  26.7*   PLT  --   --  434*      CRP trend:  109.6 (03/07) > 36.9 (03/08) > 14.8 (03/09) BMP    Recent Labs     03/08/24  1427 03/09/24  0554   SODIUM 144 145   K 3.4* 3.8    110*   CO2 27 24   AGAP 10 11   BUN 29* 29*   CREATININE 0.48* 0.52*   CALCIUM 8.3* 8.4       Coags    No recent results     Additional Electrolytes  Recent Labs     03/08/24  1427 03/09/24  0554   MG 2.1 2.2          Blood Gas    No recent results  No recent results LFTs  No recent results    Infectious  No recent results  Glucose  Recent Labs     03/07/24  1641 03/08/24  0541 03/08/24  1427 03/09/24  0554   GLUC 329* 191* 80 151*   Ranges: 105-184            Miguel Whittaker MD

## 2024-03-09 NOTE — ASSESSMENT & PLAN NOTE
Patient was extubated after prolonged intubation and weaned down to 6L O2 via NC  Very careful steroid management per critical care team.  Patient remains full code. Patient's  understands if she is re-intubated will need to readdress trach.

## 2024-03-09 NOTE — ASSESSMENT & PLAN NOTE
Code Status: full - Level 1  Patient was extubated to BiPAP on 3/1. Now on HFNC. Goal to continue medical optimization. However, if re-intubated will likely need to readdress trach.  NGT remains in place while patient not appropriate for PO. Hopeful mental status improves allowing speech/swallow eval in the next 24H.   Otherwise, patient's  has mentioned important goal of patient experiencing daughter's wedding later this year if possible.

## 2024-03-09 NOTE — PLAN OF CARE
Problem: Prexisting or High Potential for Compromised Skin Integrity  Goal: Skin integrity is maintained or improved  Description: INTERVENTIONS:  - Identify patients at risk for skin breakdown  - Assess and monitor skin integrity  - Assess and monitor nutrition and hydration status  - Monitor labs   - Assess for incontinence   - Turn and reposition patient  - Assist with mobility/ambulation  - Relieve pressure over bony prominences  - Avoid friction and shearing  - Provide appropriate hygiene as needed including keeping skin clean and dry  - Evaluate need for skin moisturizer/barrier cream  - Collaborate with interdisciplinary team   - Patient/family teaching  - Consider wound care consult   Outcome: Progressing     Problem: SAFETY ADULT  Goal: Patient will remain free of falls  Description: INTERVENTIONS:  - Educate patient/family on patient safety including physical limitations  - Instruct patient to call for assistance with activity   - Consult OT/PT to assist with strengthening/mobility   - Keep Call bell within reach  - Keep bed low and locked with side rails adjusted as appropriate  - Keep care items and personal belongings within reach  - Initiate and maintain comfort rounds  - Make Fall Risk Sign visible to staff  - Offer Toileting every 2 Hours, in advance of need  - Initiate/Maintain bed alarm  - Obtain necessary fall risk management equipment: non skid footwear  - Apply yellow socks and bracelet for high fall risk patients  - Consider moving patient to room near nurses station  Outcome: Progressing     Problem: RESPIRATORY - ADULT  Goal: Achieves optimal ventilation and oxygenation  Description: INTERVENTIONS:  - Assess for changes in respiratory status  - Assess for changes in mentation and behavior  - Position to facilitate oxygenation and minimize respiratory effort  - Oxygen administered by appropriate delivery if ordered  - Initiate smoking cessation education as indicated  - Encourage  broncho-pulmonary hygiene including cough, deep breathe, Incentive Spirometry  - Assess the need for suctioning and aspirate as needed  - Assess and instruct to report SOB or any respiratory difficulty  - Respiratory Therapy support as indicated  Outcome: Progressing     Problem: SKIN/TISSUE INTEGRITY - ADULT  Goal: Skin Integrity remains intact(Skin Breakdown Prevention)  Description: Assess:  -Perform Flavio assessment every shift   -Clean and moisturize skin every day   -Inspect skin when repositioning, toileting, and assisting with ADLS  -Assess under medical devices such as ronny  every hour   -Assess extremities for adequate circulation and sensation     Bed Management:  -Have minimal linens on bed & keep smooth, unwrinkled  -Change linens as needed when moist or perspiring  -Avoid sitting or lying in one position for more than 2 hours while in bed  -Keep HOB at 45 degrees     Toileting:  -Offer bedside commode  -Assess for incontinence every hour  -Use incontinent care products after each incontinent episode such as moisture barrier cream     Activity:  -Mobilize patient 3 times a day  -Encourage activity and walks on unit  -Encourage or provide ROM exercises   -Turn and reposition patient every 2 Hours  -Use appropriate equipment to lift or move patient in bed  -Instruct/ Assist with weight shifting every hour when out of bed in chair  -Consider limitation of chair time 2 hour intervals    Skin Care:  -Avoid use of baby powder, tape, friction and shearing, hot water or constrictive clothing  -Relieve pressure over bony prominences using allevyn   -Do not massage red bony areas    Next Steps:  -Teach patient strategies to minimize risks such as weight shifting    -Consider consults to  interdisciplinary teams such as PT  Outcome: Progressing  Goal: Pressure injury heals and does not worsen  Description: Interventions:  - Implement low air loss mattress or specialty surface (Criteria met)  - Apply silicone  foam dressing  - Instruct/assist with weight shifting every 60 minutes when in chair   - Limit chair time to 5 hour intervals  - Use special pressure reducing interventions   - Apply fecal or urinary incontinence containment device   - Perform passive or active ROM every day  - Turn and reposition patient & offload bony prominences every 2 hours   - Utilize friction reducing device or surface for transfers   - Consider nutrition services referral as needed  Outcome: Progressing     Problem: Potential for Falls  Goal: Patient will remain free of falls  Description: INTERVENTIONS:  - Educate patient/family on patient safety including physical limitations  - Instruct patient to call for assistance with activity   - Consult OT/PT to assist with strengthening/mobility   - Keep Call bell within reach  - Keep bed low and locked with side rails adjusted as appropriate  - Keep care items and personal belongings within reach  - Initiate and maintain comfort rounds  - Make Fall Risk Sign visible to staff  - Offer Toileting every 2 Hours, in advance of need  - Initiate/Maintain bed alarm  - Obtain necessary fall risk management equipment: non skid footwear  - Apply yellow socks and bracelet for high fall risk patients  - Consider moving patient to room near nurses station  Outcome: Progressing     Problem: Nutrition/Hydration-ADULT  Goal: Nutrient/Hydration intake appropriate for improving, restoring or maintaining nutritional needs  Description: Monitor and assess patient's nutrition/hydration status for malnutrition. Collaborate with interdisciplinary team and initiate plan and interventions as ordered.  Monitor patient's weight and dietary intake as ordered or per policy. Utilize nutrition screening tool and intervene as necessary. Determine patient's food preferences and provide high-protein, high-caloric foods as appropriate.     INTERVENTIONS:  - Monitor oral intake, urinary output, labs, and treatment plans  - Assess  nutrition and hydration status and recommend course of action  - Evaluate amount of meals eaten  - Assist patient with eating if necessary   - Allow adequate time for meals  - Recommend/ encourage appropriate diets, oral nutritional supplements, and vitamin/mineral supplements  - Order, calculate, and assess calorie counts as needed  - Recommend, monitor, and adjust tube feedings and TPN/PPN based on assessed needs  - Assess need for intravenous fluids  - Provide specific nutrition/hydration education as appropriate  - Include patient/family/caregiver in decisions related to nutrition  Outcome: Progressing     Problem: SAFETY,RESTRAINT: NV/NON-SELF DESTRUCTIVE BEHAVIOR  Goal: Returns to optimal restraint-free functioning  Description: INTERVENTIONS:  - Assess the patient's behavior and symptoms that indicate continued need for restraint  - Identify and implement measures to help patient regain control  - Assess readiness for release of restraint   Outcome: Progressing     Problem: COPING  Goal: Will report anxiety at manageable levels  Description: INTERVENTIONS:  - Administer medication as ordered  - Teach and encourage coping skills  - Provide emotional support  - Assess patient/family for anxiety and ability to cope  Outcome: Progressing     Problem: CARDIOVASCULAR - ADULT  Goal: Maintains optimal cardiac output and hemodynamic stability  Description: INTERVENTIONS:  - Monitor I/O, vital signs and rhythm  - Monitor for S/S and trends of decreased cardiac output  - Administer and titrate ordered vasoactive medications to optimize hemodynamic stability  - Assess quality of pulses, skin color and temperature  - Assess for signs of decreased coronary artery perfusion  - Instruct patient to report change in severity of symptoms  Outcome: Progressing  Goal: Absence of cardiac dysrhythmias or at baseline rhythm  Description: INTERVENTIONS:  - Continuous cardiac monitoring, vital signs, obtain 12 lead EKG if ordered  -  Administer antiarrhythmic and heart rate control medications as ordered  - Monitor electrolytes and administer replacement therapy as ordered  Outcome: Progressing     Problem: GASTROINTESTINAL - ADULT  Goal: Maintains adequate nutritional intake  Description: INTERVENTIONS:  - Monitor percentage of each meal consumed  - Identify factors contributing to decreased intake, treat as appropriate  - Assist with meals as needed  - Monitor I&O, weight, and lab values if indicated  - Obtain nutrition services referral as needed  Outcome: Progressing  Goal: Establish and maintain optimal ostomy function  Description: INTERVENTIONS:  - Assess bowel function  - Encourage oral fluids to ensure adequate hydration  - Administer IV fluids if ordered to ensure adequate hydration   - Administer ordered medications as needed  - Encourage mobilization and activity  - Nutrition services referral to assist patient with appropriate food choices  - Assess stoma site  - Consider wound care consult   Outcome: Progressing  Goal: Oral mucous membranes remain intact  Description: INTERVENTIONS  - Assess oral mucosa and hygiene practices  - Implement preventative oral hygiene regimen  - Implement oral medicated treatments as ordered  - Initiate Nutrition services referral as needed  Outcome: Progressing     Problem: HEMATOLOGIC - ADULT  Goal: Maintains hematologic stability  Description: INTERVENTIONS  - Assess for signs and symptoms of bleeding or hemorrhage  - Monitor labs  - Administer supportive blood products/factors as ordered and appropriate  Outcome: Progressing     Problem: MUSCULOSKELETAL - ADULT  Goal: Maintain or return mobility to safest level of function  Description: INTERVENTIONS:  - Assess patient's ability to carry out ADLs; assess patient's baseline for ADL function and identify physical deficits which impact ability to perform ADLs (bathing, care of mouth/teeth, toileting, grooming, dressing, etc.)  - Assess/evaluate cause  of self-care deficits   - Assess range of motion  - Assess patient's mobility  - Assess patient's need for assistive devices and provide as appropriate  - Encourage maximum independence but intervene and supervise when necessary  - Involve family in performance of ADLs  - Assess for home care needs following discharge   - Consider OT consult to assist with ADL evaluation and planning for discharge  - Provide patient education as appropriate  Outcome: Progressing

## 2024-03-09 NOTE — ASSESSMENT & PLAN NOTE
PSC, including MSW support, will follow closely to continue to provide supportive care as clinical situation evolves.   Encouraged patient/family on ongoing participation with PT/OT/speech therapy  Decisional apparatus:  Patient is not competent on my exam today.  If competence is lost, patient's substitute decision maker would default to spouse by [place].  Advance Directive / Living Will / POLST:  None on file, unable to complete  Will return on week of 3/11. Page sooner with questions or concerns.

## 2024-03-10 LAB
ALBUMIN SERPL BCP-MCNC: 2.9 G/DL (ref 3.5–5)
ALP SERPL-CCNC: 90 U/L (ref 34–104)
ALT SERPL W P-5'-P-CCNC: 31 U/L (ref 7–52)
ANION GAP SERPL CALCULATED.3IONS-SCNC: 12 MMOL/L
ANION GAP SERPL CALCULATED.3IONS-SCNC: 13 MMOL/L
ARTERIAL PATENCY WRIST A: YES
AST SERPL W P-5'-P-CCNC: 18 U/L (ref 13–39)
ATRIAL RATE: 104 BPM
ATRIAL RATE: 110 BPM
ATRIAL RATE: 116 BPM
ATRIAL RATE: 118 BPM
ATRIAL RATE: 135 BPM
BASE EX.OXY STD BLDV CALC-SCNC: 73.3 % (ref 60–80)
BASE EXCESS BLDA CALC-SCNC: -2 MMOL/L
BASE EXCESS BLDV CALC-SCNC: -1.5 MMOL/L
BILIRUB DIRECT SERPL-MCNC: 0.22 MG/DL (ref 0–0.2)
BILIRUB SERPL-MCNC: 0.59 MG/DL (ref 0.2–1)
BUN SERPL-MCNC: 38 MG/DL (ref 5–25)
BUN SERPL-MCNC: 39 MG/DL (ref 5–25)
CALCIUM SERPL-MCNC: 8 MG/DL (ref 8.4–10.2)
CALCIUM SERPL-MCNC: 8.7 MG/DL (ref 8.4–10.2)
CHLORIDE SERPL-SCNC: 112 MMOL/L (ref 96–108)
CHLORIDE SERPL-SCNC: 114 MMOL/L (ref 96–108)
CO2 SERPL-SCNC: 20 MMOL/L (ref 21–32)
CO2 SERPL-SCNC: 24 MMOL/L (ref 21–32)
CREAT SERPL-MCNC: 0.59 MG/DL (ref 0.6–1.3)
CREAT SERPL-MCNC: 0.64 MG/DL (ref 0.6–1.3)
CRP SERPL QL: 112.1 MG/L
ERYTHROCYTE [DISTWIDTH] IN BLOOD BY AUTOMATED COUNT: 21.1 % (ref 11.6–15.1)
GFR SERPL CREATININE-BSD FRML MDRD: 101 ML/MIN/1.73SQ M
GFR SERPL CREATININE-BSD FRML MDRD: 98 ML/MIN/1.73SQ M
GLUCOSE SERPL-MCNC: 116 MG/DL (ref 65–140)
GLUCOSE SERPL-MCNC: 127 MG/DL (ref 65–140)
GLUCOSE SERPL-MCNC: 136 MG/DL (ref 65–140)
GLUCOSE SERPL-MCNC: 142 MG/DL (ref 65–140)
GLUCOSE SERPL-MCNC: 143 MG/DL (ref 65–140)
GLUCOSE SERPL-MCNC: 149 MG/DL (ref 65–140)
GLUCOSE SERPL-MCNC: 176 MG/DL (ref 65–140)
GLUCOSE SERPL-MCNC: 193 MG/DL (ref 65–140)
GLUCOSE SERPL-MCNC: 204 MG/DL (ref 65–140)
GLUCOSE SERPL-MCNC: 215 MG/DL (ref 65–140)
GLUCOSE SERPL-MCNC: 298 MG/DL (ref 65–140)
GLUCOSE SERPL-MCNC: 86 MG/DL (ref 65–140)
HCO3 BLDA-SCNC: 21.2 MMOL/L (ref 22–28)
HCO3 BLDV-SCNC: 23.3 MMOL/L (ref 24–30)
HCT VFR BLD AUTO: 26.9 % (ref 34.8–46.1)
HGB BLD-MCNC: 8.6 G/DL (ref 11.5–15.4)
LACTATE SERPL-SCNC: 1.4 MMOL/L (ref 0.5–2)
LACTATE SERPL-SCNC: 3.3 MMOL/L (ref 0.5–2)
LACTATE SERPL-SCNC: 3.5 MMOL/L (ref 0.5–2)
MAGNESIUM SERPL-MCNC: 2.2 MG/DL (ref 1.9–2.7)
MCH RBC QN AUTO: 30.7 PG (ref 26.8–34.3)
MCHC RBC AUTO-ENTMCNC: 32 G/DL (ref 31.4–37.4)
MCV RBC AUTO: 96 FL (ref 82–98)
NASAL CANNULA: ABNORMAL
O2 CT BLDA-SCNC: 10.7 ML/DL (ref 16–23)
O2 CT BLDV-SCNC: 9.9 ML/DL
OXYHGB MFR BLDA: 90.9 % (ref 94–97)
P AXIS: 45 DEGREES
P AXIS: 46 DEGREES
P AXIS: 46 DEGREES
P AXIS: 55 DEGREES
PCO2 BLDA: 29.8 MM HG (ref 36–44)
PCO2 BLDV: 39.2 MM HG (ref 42–50)
PH BLDA: 7.47 [PH] (ref 7.35–7.45)
PH BLDV: 7.39 [PH] (ref 7.3–7.4)
PLATELET # BLD AUTO: 400 THOUSANDS/UL (ref 149–390)
PMV BLD AUTO: 12.5 FL (ref 8.9–12.7)
PO2 BLDA: 60.7 MM HG (ref 75–129)
PO2 BLDV: 40.3 MM HG (ref 35–45)
POTASSIUM SERPL-SCNC: 2.8 MMOL/L (ref 3.5–5.3)
POTASSIUM SERPL-SCNC: 4 MMOL/L (ref 3.5–5.3)
PR INTERVAL: 124 MS
PR INTERVAL: 128 MS
PR INTERVAL: 130 MS
PR INTERVAL: 134 MS
PROCALCITONIN SERPL-MCNC: 1.08 NG/ML
PROT SERPL-MCNC: 5.5 G/DL (ref 6.4–8.4)
QRS AXIS: 16 DEGREES
QRS AXIS: 16 DEGREES
QRS AXIS: 22 DEGREES
QRS AXIS: 30 DEGREES
QRS AXIS: 4 DEGREES
QRSD INTERVAL: 62 MS
QRSD INTERVAL: 66 MS
QRSD INTERVAL: 66 MS
QRSD INTERVAL: 68 MS
QRSD INTERVAL: 70 MS
QT INTERVAL: 290 MS
QT INTERVAL: 298 MS
QT INTERVAL: 332 MS
QT INTERVAL: 342 MS
QT INTERVAL: 352 MS
QTC INTERVAL: 414 MS
QTC INTERVAL: 435 MS
QTC INTERVAL: 449 MS
QTC INTERVAL: 493 MS
QTC INTERVAL: 524 MS
RBC # BLD AUTO: 2.8 MILLION/UL (ref 3.81–5.12)
SODIUM SERPL-SCNC: 147 MMOL/L (ref 135–147)
SODIUM SERPL-SCNC: 148 MMOL/L (ref 135–147)
SPECIMEN SOURCE: ABNORMAL
T WAVE AXIS: 37 DEGREES
T WAVE AXIS: 40 DEGREES
T WAVE AXIS: 41 DEGREES
T WAVE AXIS: 46 DEGREES
T WAVE AXIS: 82 DEGREES
VENTRICULAR RATE: 104 BPM
VENTRICULAR RATE: 116 BPM
VENTRICULAR RATE: 118 BPM
VENTRICULAR RATE: 135 BPM
VENTRICULAR RATE: 150 BPM
WBC # BLD AUTO: 13.35 THOUSAND/UL (ref 4.31–10.16)

## 2024-03-10 PROCEDURE — 94760 N-INVAS EAR/PLS OXIMETRY 1: CPT

## 2024-03-10 PROCEDURE — 82805 BLOOD GASES W/O2 SATURATION: CPT | Performed by: STUDENT IN AN ORGANIZED HEALTH CARE EDUCATION/TRAINING PROGRAM

## 2024-03-10 PROCEDURE — 93010 ELECTROCARDIOGRAM REPORT: CPT | Performed by: INTERNAL MEDICINE

## 2024-03-10 PROCEDURE — 83605 ASSAY OF LACTIC ACID: CPT | Performed by: STUDENT IN AN ORGANIZED HEALTH CARE EDUCATION/TRAINING PROGRAM

## 2024-03-10 PROCEDURE — 86140 C-REACTIVE PROTEIN: CPT | Performed by: STUDENT IN AN ORGANIZED HEALTH CARE EDUCATION/TRAINING PROGRAM

## 2024-03-10 PROCEDURE — 94664 DEMO&/EVAL PT USE INHALER: CPT

## 2024-03-10 PROCEDURE — 94640 AIRWAY INHALATION TREATMENT: CPT

## 2024-03-10 PROCEDURE — 93005 ELECTROCARDIOGRAM TRACING: CPT

## 2024-03-10 PROCEDURE — 99233 SBSQ HOSP IP/OBS HIGH 50: CPT | Performed by: INTERNAL MEDICINE

## 2024-03-10 PROCEDURE — 83735 ASSAY OF MAGNESIUM: CPT | Performed by: STUDENT IN AN ORGANIZED HEALTH CARE EDUCATION/TRAINING PROGRAM

## 2024-03-10 PROCEDURE — 85027 COMPLETE CBC AUTOMATED: CPT | Performed by: STUDENT IN AN ORGANIZED HEALTH CARE EDUCATION/TRAINING PROGRAM

## 2024-03-10 PROCEDURE — 80048 BASIC METABOLIC PNL TOTAL CA: CPT | Performed by: STUDENT IN AN ORGANIZED HEALTH CARE EDUCATION/TRAINING PROGRAM

## 2024-03-10 PROCEDURE — 80076 HEPATIC FUNCTION PANEL: CPT | Performed by: STUDENT IN AN ORGANIZED HEALTH CARE EDUCATION/TRAINING PROGRAM

## 2024-03-10 PROCEDURE — 84145 PROCALCITONIN (PCT): CPT | Performed by: STUDENT IN AN ORGANIZED HEALTH CARE EDUCATION/TRAINING PROGRAM

## 2024-03-10 PROCEDURE — 82948 REAGENT STRIP/BLOOD GLUCOSE: CPT

## 2024-03-10 RX ORDER — SODIUM CHLORIDE, SODIUM GLUCONATE, SODIUM ACETATE, POTASSIUM CHLORIDE, MAGNESIUM CHLORIDE, SODIUM PHOSPHATE, DIBASIC, AND POTASSIUM PHOSPHATE .53; .5; .37; .037; .03; .012; .00082 G/100ML; G/100ML; G/100ML; G/100ML; G/100ML; G/100ML; G/100ML
500 INJECTION, SOLUTION INTRAVENOUS ONCE
Status: COMPLETED | OUTPATIENT
Start: 2024-03-10 | End: 2024-03-10

## 2024-03-10 RX ORDER — VENLAFAXINE 25 MG/1
25 TABLET ORAL DAILY
Status: DISCONTINUED | OUTPATIENT
Start: 2024-03-10 | End: 2024-03-16

## 2024-03-10 RX ORDER — POTASSIUM CHLORIDE 20MEQ/15ML
40 LIQUID (ML) ORAL ONCE
Status: COMPLETED | OUTPATIENT
Start: 2024-03-10 | End: 2024-03-10

## 2024-03-10 RX ORDER — FUROSEMIDE 10 MG/ML
40 INJECTION INTRAMUSCULAR; INTRAVENOUS ONCE
Status: COMPLETED | OUTPATIENT
Start: 2024-03-10 | End: 2024-03-10

## 2024-03-10 RX ORDER — VANCOMYCIN HYDROCHLORIDE 750 MG/150ML
750 INJECTION, SOLUTION INTRAVENOUS EVERY 12 HOURS
Status: DISCONTINUED | OUTPATIENT
Start: 2024-03-10 | End: 2024-03-11

## 2024-03-10 RX ORDER — POTASSIUM CHLORIDE 14.9 MG/ML
20 INJECTION INTRAVENOUS
Qty: 200 ML | Refills: 0 | Status: COMPLETED | OUTPATIENT
Start: 2024-03-10 | End: 2024-03-10

## 2024-03-10 RX ORDER — SODIUM CHLORIDE, SODIUM GLUCONATE, SODIUM ACETATE, POTASSIUM CHLORIDE, MAGNESIUM CHLORIDE, SODIUM PHOSPHATE, DIBASIC, AND POTASSIUM PHOSPHATE .53; .5; .37; .037; .03; .012; .00082 G/100ML; G/100ML; G/100ML; G/100ML; G/100ML; G/100ML; G/100ML
1000 INJECTION, SOLUTION INTRAVENOUS ONCE
Status: COMPLETED | OUTPATIENT
Start: 2024-03-10 | End: 2024-03-10

## 2024-03-10 RX ADMIN — SODIUM CHLORIDE 8 UNITS/HR: 9 INJECTION, SOLUTION INTRAVENOUS at 09:49

## 2024-03-10 RX ADMIN — Medication 2 SPRAY: at 22:56

## 2024-03-10 RX ADMIN — VANCOMYCIN HYDROCHLORIDE 750 MG: 750 INJECTION, SOLUTION INTRAVENOUS at 18:25

## 2024-03-10 RX ADMIN — REMDESIVIR 100 MG: 100 INJECTION, POWDER, LYOPHILIZED, FOR SOLUTION INTRAVENOUS at 15:59

## 2024-03-10 RX ADMIN — IPRATROPIUM BROMIDE 0.5 MG: 0.5 SOLUTION RESPIRATORY (INHALATION) at 07:56

## 2024-03-10 RX ADMIN — METRONIDAZOLE 500 MG: 500 TABLET ORAL at 15:09

## 2024-03-10 RX ADMIN — CEFEPIME 2000 MG: 2 INJECTION, POWDER, FOR SOLUTION INTRAVENOUS at 08:23

## 2024-03-10 RX ADMIN — POTASSIUM CHLORIDE 20 MEQ: 14.9 INJECTION, SOLUTION INTRAVENOUS at 19:39

## 2024-03-10 RX ADMIN — Medication 2 SPRAY: at 17:46

## 2024-03-10 RX ADMIN — METRONIDAZOLE 500 MG: 500 TABLET ORAL at 05:42

## 2024-03-10 RX ADMIN — CHLORHEXIDINE GLUCONATE 0.12% ORAL RINSE 15 ML: 1.2 LIQUID ORAL at 09:27

## 2024-03-10 RX ADMIN — FAMOTIDINE 20 MG: 20 TABLET, FILM COATED ORAL at 10:16

## 2024-03-10 RX ADMIN — CEFEPIME 2000 MG: 2 INJECTION, POWDER, FOR SOLUTION INTRAVENOUS at 15:59

## 2024-03-10 RX ADMIN — METHYLPREDNISOLONE SODIUM SUCCINATE 60 MG: 125 INJECTION, POWDER, FOR SOLUTION INTRAMUSCULAR; INTRAVENOUS at 22:21

## 2024-03-10 RX ADMIN — IPRATROPIUM BROMIDE 0.5 MG: 0.5 SOLUTION RESPIRATORY (INHALATION) at 14:04

## 2024-03-10 RX ADMIN — CEFEPIME 2000 MG: 2 INJECTION, POWDER, FOR SOLUTION INTRAVENOUS at 00:28

## 2024-03-10 RX ADMIN — VANCOMYCIN HYDROCHLORIDE 1750 MG: 1 INJECTION, POWDER, LYOPHILIZED, FOR SOLUTION INTRAVENOUS at 07:21

## 2024-03-10 RX ADMIN — POLYETHYLENE GLYCOL 3350 17 G: 17 POWDER, FOR SOLUTION ORAL at 09:27

## 2024-03-10 RX ADMIN — METHYLPREDNISOLONE SODIUM SUCCINATE 60 MG: 125 INJECTION, POWDER, FOR SOLUTION INTRAMUSCULAR; INTRAVENOUS at 05:42

## 2024-03-10 RX ADMIN — NIRMATRELVIR AND RITONAVIR 3 TABLET: KIT at 17:56

## 2024-03-10 RX ADMIN — SODIUM CHLORIDE, SODIUM GLUCONATE, SODIUM ACETATE, POTASSIUM CHLORIDE, MAGNESIUM CHLORIDE, SODIUM PHOSPHATE, DIBASIC, AND POTASSIUM PHOSPHATE 1000 ML: .53; .5; .37; .037; .03; .012; .00082 INJECTION, SOLUTION INTRAVENOUS at 07:21

## 2024-03-10 RX ADMIN — NIRMATRELVIR AND RITONAVIR 3 TABLET: KIT at 10:16

## 2024-03-10 RX ADMIN — METRONIDAZOLE 500 MG: 500 TABLET ORAL at 22:23

## 2024-03-10 RX ADMIN — LAMOTRIGINE 150 MG: 100 TABLET ORAL at 09:27

## 2024-03-10 RX ADMIN — ALTEPLASE 2 MG: 2.2 INJECTION, POWDER, LYOPHILIZED, FOR SOLUTION INTRAVENOUS at 00:21

## 2024-03-10 RX ADMIN — CHLORHEXIDINE GLUCONATE 0.12% ORAL RINSE 15 ML: 1.2 LIQUID ORAL at 21:09

## 2024-03-10 RX ADMIN — METHYLPREDNISOLONE SODIUM SUCCINATE 60 MG: 125 INJECTION, POWDER, FOR SOLUTION INTRAMUSCULAR; INTRAVENOUS at 15:09

## 2024-03-10 RX ADMIN — POTASSIUM CHLORIDE 20 MEQ: 14.9 INJECTION, SOLUTION INTRAVENOUS at 20:38

## 2024-03-10 RX ADMIN — Medication 2 SPRAY: at 15:59

## 2024-03-10 RX ADMIN — TOPIRAMATE 100 MG: 100 TABLET, FILM COATED ORAL at 09:27

## 2024-03-10 RX ADMIN — SODIUM CHLORIDE, SODIUM GLUCONATE, SODIUM ACETATE, POTASSIUM CHLORIDE, MAGNESIUM CHLORIDE, SODIUM PHOSPHATE, DIBASIC, AND POTASSIUM PHOSPHATE 500 ML: .53; .5; .37; .037; .03; .012; .00082 INJECTION, SOLUTION INTRAVENOUS at 11:48

## 2024-03-10 RX ADMIN — FUROSEMIDE 40 MG: 10 INJECTION, SOLUTION INTRAMUSCULAR; INTRAVENOUS at 01:58

## 2024-03-10 RX ADMIN — NYSTATIN: 100000 POWDER TOPICAL at 09:28

## 2024-03-10 RX ADMIN — Medication 2 SPRAY: at 03:00

## 2024-03-10 RX ADMIN — LEVALBUTEROL HYDROCHLORIDE 1.25 MG: 1.25 SOLUTION RESPIRATORY (INHALATION) at 07:56

## 2024-03-10 RX ADMIN — LEVALBUTEROL HYDROCHLORIDE 1.25 MG: 1.25 SOLUTION RESPIRATORY (INHALATION) at 14:04

## 2024-03-10 RX ADMIN — Medication 2 SPRAY: at 09:28

## 2024-03-10 RX ADMIN — IPRATROPIUM BROMIDE 0.5 MG: 0.5 SOLUTION RESPIRATORY (INHALATION) at 19:10

## 2024-03-10 RX ADMIN — ENOXAPARIN SODIUM 40 MG: 40 INJECTION SUBCUTANEOUS at 09:27

## 2024-03-10 RX ADMIN — TOPIRAMATE 100 MG: 100 TABLET, FILM COATED ORAL at 17:56

## 2024-03-10 RX ADMIN — NYSTATIN: 100000 POWDER TOPICAL at 17:46

## 2024-03-10 RX ADMIN — FAMOTIDINE 20 MG: 20 TABLET, FILM COATED ORAL at 17:56

## 2024-03-10 RX ADMIN — Medication 2 SPRAY: at 05:42

## 2024-03-10 RX ADMIN — POTASSIUM CHLORIDE 40 MEQ: 1.5 SOLUTION ORAL at 19:39

## 2024-03-10 RX ADMIN — LEVALBUTEROL HYDROCHLORIDE 1.25 MG: 1.25 SOLUTION RESPIRATORY (INHALATION) at 19:10

## 2024-03-10 RX ADMIN — VENLAFAXINE 25 MG: 25 TABLET ORAL at 08:39

## 2024-03-10 RX ADMIN — LAMOTRIGINE 150 MG: 100 TABLET ORAL at 17:56

## 2024-03-10 NOTE — PROGRESS NOTES
[facility]  Progress Note: Critical Care  Name: Carla Hunt 59 y.o. 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  Anemia requiring transfusion  Hyupernatremia  B-cell lymphoma with history of treatment with Rituxan  Minimal SAH with no neurosurgical intervention indicated at the time  Seizure disorder s/p left temporal lobe partial resection  Anxiety  Steroid-induced hyperglycemia  Sacral ulcers bilaterally     Neuro:   Sedation: Off sedation  -Monitor for withdrawal in context of patients history of seizures     Diagnosis: Analgesia  -On oxycodone 2.5 mg and 5 mg every 4 hours PRN     Diagnosis: seizure disorder  -S/p partial left temporal lobe resection in 2007  -On Lamictal 150 bid and Topamax 100 bid  -Last lamotrigine level from 03/02 at 10.7 (therapeutic)     Diagnosis: Anxiety  -Restarted effexor 25 mg QD     CV:   Diagnosis: Distributive shock, resolved  -Off pressors  -Maintain MAPs > 65 mmHg     Pulm:  Diagnosis: Acute hypoxic respiratory failure due to severe covid  and covid induced fibrosis  -Completed severe COVID pathway and 7 days CTX initially. Then completed 5 day course of remdesevir in late February for Covid19 positive PCR from bronchoscopic cultures. Now covid positive with antigen testing  -S/p Bronch 2/2, 02/16, 02/21  -PCP and AFB negative   -Legionella, viral, fungal cultures no growth to  date  -Pulse dose steroids with solumedrol 1g daily x3 days (completed 2/7) then transitioned to prednisone 80mg daily on 2/8. Also completed veletri treatment and 5 day course of IVIG.   -CT scan 2/24: general improvement of areas of consolidation and some areas of groundglass opacification on the lungs, still with significant groundglass opacification especially in the lower lobes. Bilateral consolidations in lower lobes. No evidence of PE.  -Extubated 03/01. Was on bipap, then on HFNC and now MFNC.  -Currently on solumedrol  60 mg Q8H  -Planning for CRP daily  -CRP trend   109.6 (03/07) > 36.9 (03/08) > 14.8 (03/09) > 112.1 (03/10)     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  -Monitor output of ostomy pouch and Hemoglobin  -Stoma appearing slightly retracted, still having some output  -Surgery following, will keep them updated if any further changes  -Monitor ostomy output  -Tube feeds running     -On famotidine 20 mg QD for GI prophylaxis and to reduce risk of upper GI bleed in this patient on significant amount of steroid regimen     :   Diagnosis: CKD III  -Baseline Cr appears to be 0.8-1.2  -UA unremarkable   -Upon chart review 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) though unclear how this was diagnosed, this also does not enirely fit as she is NOT hypokalemic  -Continue to monitor I/O and UOP     Diagnosis: Hypernatremia, resolved, currently at 145  -Continue on free water flushes     Diagnosis: Acute kidney injury, sCr at 0.55, resolved     F/E/N:   F: Off IVF  E: monitor and replete for goal K>4, P>3, Mg>2  N: On tube feeds with vital 1.5 with free water flushes 200 mL every 6 hours     Heme/Onc:   Diagnosis: Anemia  Likely multifactorial in nature, acute in the setting of recent sepsis/inflammatory state complicated by chronic anemia due to her other history of lymphoma and CKD  -She did receive 1 unit transfusion on 02/29, another  unit on 03/06  -Iron panel with ferritin 974, iron 10, TIBC 173     Diagnosis: Thrombocytopenia, resolved.   -Will continue to monitor  -Monitor sites for bleeding     Diagnosis: B cell lymphoma  -Follows with heme/onc at [facility]  -On rituxan for 2 year course, started May 2022  -Last dose was 12/20, treatment currently on hold   -Leukopenic on admission but WBC now wnl     DVT ppx with lovenox     Endo:   Diagnosis: steroid hyperglycemia and diabetes mellitus type 2, A1c at 6.6 from 01/2024  -Goal -180  -BG range last 24hrs 105-184  -On insulin drip     ID:   Diagnosis: Severe covid pneumonia and stenotrophomonas pneumonia  -Completed severe COVID pathway with decadron (ended 1/22) and remdesivir (ended 1/20), also completed 7 days CTX on 1/15  -C/f opportunistic infections given immunocompromised status on chemotherapy and recent steroid use  -No consolidations on CXR and procal negative  -S/p bactrim and minocycline x5 days for stenotrophomonas on sputum culture (1/25), then on bactrim for PJP ppx  -Bronc on 2/2 - Cx w/o growth (initially resulted as 1+ alpha hemolytic strep), AFB & PCP negative, f/u fungal, legionella, and viral cultures   -UA with moderate leukocytes, nitrite negative, 30-50 WBC, trace protein.  -Repeat sputum culture 2/9 with 4+ stenotrophomonas  -Bronch cultures with candida albicans, Rare gram positive cocci in pairs  -Previously completed 14 day course of antibiotics for stenotrophomonas pneumonia  -Levaquin continued for an additional 7 days since bronchial cultures with pseudomonas MDR susceptible to levaquin. Levaquin discontinued at this point since patient has likely completed >7 days of therapy for pseudomonas susceptible to antibiotics.  -RSV/Flu/Covid panel positive, we are using that to assess the amount of covid viral particle shedding quantitatively as we continue to treat her with remdesevir and paxlovid  -On cefepime to cover treatment for stoma-wall cellulitis and possible  pneumonia, day 5 today. On Flagyl for anaerobic coerage 500 mg Q8H started on 03/08. Off vancomycin.   -On remdesecir and paxlovid for 14 day course, plan for Monday morning PCR test as recommended by ID     MSK/Skin:   -Wound care team following for bilateral sacral wounds       Disposition: Critical care    ICU Core Measures     A: Assess, Prevent, and Manage Pain Has pain been assessed? Yes  Need for changes to pain regimen? No   B: Both SAT/SAT  N/A   C: Choice of Sedation RASS Goal: N/A patient not on sedation  Need for changes to sedation or analgesia regimen? NA   D: Delirium CAM-ICU: Unable to perform secondary to Acute cognitive dysfunction   E: Early Mobility  Plan for early mobility? Yes   F: Family Engagement Plan for family engagement today? Yes       Antibiotic Review: Patient on appropriate coverage based on culture data.     Review of Invasive Devices:    Ortiz Plan: Continue for accurate I/O monitoring for 48 hours  Central access plan: Medications requiring central line      Prophylaxis:  VTE VTE covered by:  enoxaparin, Subcutaneous, 40 mg at 03/09/24 0836       Stress Ulcer  covered byfamotidine (PEPCID) tablet 20 mg [[id number]]        Significant 24hr Events     24hr events: Overnight,  Had worsening tachypnea to 50-60s but saturating around 90%, remained on 13-14L midflow. Potassium at 4.2, I/O about even. She was given an additional 40 IV lasix in evening around 5pm with not significant output. Noted to have bicarbonate drop. Received an additional lasix 40 IV in the night. This morning appearing confused. Labs ordered by overnight team and were currently pending include-  lactic, procal, VBG and CXR       Subjective   Patient seen by bedside, plan to reach out to patients family to discuss medical updates and plan.     Review of Systems   Unable to perform ROS: Patient nonverbal        Objective                            Vitals I/O      Most Recent Min/Max in 24hrs   Temp 98 °F (36.7 °C)  Temp  Min: 97.5 °F (36.4 °C)  Max: 98.1 °F (36.7 °C)   Pulse (!) 109 Pulse  Min: 109  Max: 139   Resp (!) 45 Resp  Min: 31  Max: 51   /65 BP  Min: 102/68  Max: 231/140   O2 Sat 90 % SpO2  Min: 90 %  Max: 97 %   On MFNC 13-14L   Intake/Output Summary (Last 24 hours) at 3/10/2024 0645  Last data filed at 3/10/2024 0101  Gross per 24 hour   Intake 2597.41 ml   Output 2490 ml   Net 107.41 ml       Diet Enteral/Parenteral; Tube Feeding No Oral Diet; Vital 1.5; Continuous; 50; Prosource Protein Liquid - One Packet; 200; Water; Every 6 hours    Invasive Monitoring           Physical Exam   Physical Exam  Vitals reviewed.   Skin:     General: Skin is warm.      Capillary Refill: Capillary refill takes less than 2 seconds.   HENT:      Head: Normocephalic.   Cardiovascular:      Rate and Rhythm: Tachycardia present.      Pulses: Normal pulses.   Abdominal: General: There is distension.     Palpations: Abdomen is soft.      Tenderness: There is abdominal tenderness.      Comments: Ostomy pouch with dark brown stool   Constitutional:       Appearance: She is ill-appearing.      Comments: Has an NG tube, marie, left sided CVC. SCDs noted.    Pulmonary:      Effort: Pulmonary effort is normal.   Neurological:      Comments: Patient nonverbal at this time, unable to follow simple commands this morning. Does withdraw to noxious stimuli.    Genitourinary/Anorectal:  Marie present.          Diagnostic Studies      EKG: Reviewed  Imaging: Reviewed I have personally reviewed pertinent reports.       Medications:  Scheduled PRN   cefepime, 2,000 mg, Q8H  chlorhexidine, 15 mL, Q12H SARTHAK  enoxaparin, 40 mg, Q24H SARTHAK  famotidine, 20 mg, BID  ipratropium, 0.5 mg, TID  lamoTRIgine, 150 mg, BID  levalbuterol, 1.25 mg, TID  methylPREDNISolone sodium succinate, 60 mg, Q8H SARTHAK  metroNIDAZOLE, 500 mg, Q8H SARTHAK  multi-electrolyte, 1,000 mL, Once  nirmatrelvir & ritonavir, 3 tablet, BID  nystatin, , BID  oxymetazoline, 2 spray,  BID  polyethylene glycol, 17 g, Daily  remdesivir, 100 mg, Q24H  sodium chloride, 2 spray, Q4H  topiramate, 100 mg, BID  vancomycin, 15 mg/kg, Once      acetaminophen, 650 mg, Q6H PRN  hydrALAZINE, 10 mg, Q6H PRN  OLANZapine, 10 mg, HS PRN  ondansetron, 4 mg, Q6H PRN  oxyCODONE, 2.5 mg, Q4H PRN   Or  oxyCODONE, 5 mg, Q4H PRN  sodium chloride, 1 Application, Q1H PRN       Continuous    insulin regular (HumuLIN R,NovoLIN R) 1 Units/mL in sodium chloride 0.9 % 100 mL infusion, 0.3-21 Units/hr, Last Rate: 4 Units/hr (03/10/24 0304)         Labs:    CBC    Recent Labs     03/09/24  0554 03/10/24  0513   WBC 21.99*  21.99* 13.35*   HGB 8.7*  8.7* 8.6*   HCT 27.8*  27.8* 26.9*   *  419* 400*   BANDSPCT 2  --      BMP    Recent Labs     03/09/24  2225 03/10/24  0513   SODIUM 146 148*   K 4.2 4.0   * 112*   CO2 18* 24   AGAP 15 12   BUN 34* 38*   CREATININE 0.57* 0.59*   CALCIUM 8.2* 8.7       Coags    No recent results    CRP trend   109.6 (03/07) > 36.9 (03/08) > 14.8 (03/09) > 112.1 (03/10)  Procal: 1.08   Lactic acid: 3.5 Additional Electrolytes  Recent Labs     03/09/24  0554 03/10/24  0513   MG 2.2 2.2          Blood Gas    No recent results  Recent Labs     03/10/24  0534   PHVEN 7.391   [id number] 39.2*   PO2VEN 40.3   [id number] 23.3*   BEVEN -1.5   V7IBBMI 73.3    LFTs  No recent results    Infectious  Recent Labs     03/10/24  0513   PROCALCITONI 1.08*     Glucose  Recent Labs     03/09/24  0554 03/09/24  1358 03/09/24  2225 03/10/24  0513   GLUC 151* 143* 187* 176*               Miguel Whittaker MD

## 2024-03-10 NOTE — SPEECH THERAPY NOTE
Speech Language/Pathology  Arrived for ongoing dx speech tx.  Per nursing pt not appropriate and remains sleepy at this time.  Will return as able for ongoing assessment/trials.

## 2024-03-10 NOTE — PROGRESS NOTES
Critical Care Attending Note     59 years old with B-Cell lymphoma - completed rituximab in Dec 2023, seizure disorder, anxiety, CKD III, presented 1/7 for encephalopathy. Found to have COVID-19 infection.  Treated Remdesivir/Decadron/actemra, extensive neurologic workup negative except very small SAH (LP, imaging, vEEG).  She has had protracted course with repeated episodes of worsened hypoxic respiratory failure to include multiple bronchoscopies with treatment for possible drug induced or COVID pneumonitis and Stenotrophomonas.  She has previously clinically improved on steroid courses.  Returned ICU 2/1 - pulse dosed steroids 2/5-2/8 with further taper, required intubation 2/13 also with severe epistaxis.  IVIG course completed 2/15. Repeated Remdesivir course 2/20-2/25.  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  She was extubated 3/1.  She has not cleared COVID and resumed remdesivir and paxlovid.       24hr events - intermittently agitated yesterday, started low dose ativan with some improvements, repeat head CT obtained secondary to anisocoria which was unchanged,  remained on midflow NC.  Given further lasix 40mg overnight.  Also given 500cc IVF for rising LA, noted rising CRP.  She denied pain or dyspnea this am by nodding head, denied abd pain.      VS AF, -120's, MAPS 80-90's, SpO2 90-96% 13-14L midflow, I/Os -150cc  Exam  GEN middle aged chronically ill appearing woman in bed, awake, follows simple commands intermittently, profoundly weak all extremities   HEENT PERRL, no nystagmus, MMM, no thrush  NECK no accessory muscle use, LIJ TLC w/o purulence  CV reg, mildly tachy, single s1/2, no m/r  Pulm tachypnea, diminished at bases, no wheeze or rales, weak cough, no sputum production  ABD +BS, ostomy in place with more pink stoma, no bleeding, brown stool in place ND, nonrigid, incision w/o purulence drainage   EXT 1+ dependent edema, warm, brisk cap  refill   marie in place yellow urine    Laboratory and Diagnostics  Results from last 7 days   Lab Units 03/10/24  0513 03/09/24  0554 03/08/24  0541 03/07/24  1829 03/07/24  1328 03/07/24  0435 03/06/24  1108 03/06/24  0441 03/05/24  0458 03/04/24  1509 03/04/24  1044 03/04/24  0605   WBC Thousand/uL 13.35* 21.99*  21.99* 18.10*  --   --  7.75  --  7.51 14.78*  --  17.10* 16.57*   HEMOGLOBIN g/dL 8.6* 8.7*  8.7* 8.5* 8.1* 8.6* 8.1* 8.8* 6.5* 7.5*   < > 7.5* 7.6*   HEMATOCRIT % 26.9* 27.8*  27.8* 26.7*  --  26.8* 25.0* 28.3* 21.5* 24.1*   < > 25.0* 25.2*   PLATELETS Thousands/uL 400* 419*  419* 434*  --   --  250  --  238 323  --  266 251   BANDS PCT %  --  2  --   --   --   --   --   --   --   --   --  2   MONO PCT %  --  1*  --   --   --   --   --   --   --   --   --  1*   EOS PCT %  --  0  --   --   --   --   --   --   --   --   --  0    < > = values in this interval not displayed.     Results from last 7 days   Lab Units 03/10/24  0513 03/09/24  2225 03/09/24  1358 03/09/24  0554 03/08/24  1427 03/08/24  0541 03/07/24  1641 03/04/24  1828 03/04/24  0605   SODIUM mmol/L 148* 146 144 145 144 143 147   < > 159*   POTASSIUM mmol/L 4.0 4.2 3.4* 3.8 3.4* 2.8* 3.6   < > 3.6   CHLORIDE mmol/L 112* 113* 111* 110* 107 106 115*   < > 123*   CO2 mmol/L 24 18* 24 24 27 24 24   < > 28   ANION GAP mmol/L 12 15 9 11 10 13 8   < > 8   BUN mg/dL 38* 34* 33* 29* 29* 31* 32*   < > 34*   CREATININE mg/dL 0.59* 0.57* 0.54* 0.52* 0.48* 0.56* 0.55*   < > 0.54*   CALCIUM mg/dL 8.7 8.2* 8.5 8.4 8.3* 8.3* 8.0*   < > 10.1   GLUCOSE RANDOM mg/dL 176* 187* 143* 151* 80 191* 329*   < > 179*   ALT U/L  --   --   --   --   --   --   --   --  17   AST U/L  --   --   --   --   --   --   --   --  8*   ALK PHOS U/L  --   --   --   --   --   --   --   --  61   ALBUMIN g/dL  --   --   --   --   --   --   --   --  3.0*   TOTAL BILIRUBIN mg/dL  --   --   --   --   --   --   --   --  0.53    < > = values in this interval not displayed.     Results  from last 7 days   Lab Units 03/10/24  0513 03/09/24  0554 03/08/24  1427 03/08/24  0541 03/07/24  0435 03/06/24  0441 03/05/24  0458 03/04/24  0605   MAGNESIUM mg/dL 2.2 2.2 2.1 2.0 2.2 2.1 2.2 2.4   PHOSPHORUS mg/dL  --   --   --   --  2.5*  --   --  2.2*               Results from last 7 days   Lab Units 03/10/24  0851 03/10/24  0513 03/03/24  1234   LACTIC ACID mmol/L 3.3* 3.5* 1.3     Results from last 7 days   Lab Units 03/10/24  0513 03/09/24  0554 03/08/24  0541 03/07/24  0435 03/06/24  0441 03/05/24  0458 03/04/24  0605   CRP mg/L 112.1* 14.8* 36.9* 109.6* 172.3* 97.3* 215.2*             Results from last 7 days   Lab Units 03/04/24  0605   D-DIMER QUANTITATIVE ug/ml FEU 1.47*     Results from last 7 days   Lab Units 03/10/24  0513   PROCALCITONIN ng/ml 1.08*       ABG:   Results from last 7 days   Lab Units 03/10/24  0803   PH ART  7.470*   PCO2 ART mm Hg 29.8*   PO2 ART mm Hg 60.7*   HCO3 ART mmol/L 21.2*   BASE EXC ART mmol/L -2.0   ABG SOURCE  Radial, Right       MICRO  3/5 MRSA neg  COVID PCR (+) 3/3, FLU/RSV neg  Bld CX 2/26 - NGTD  COVID PCR 2/21 - indeterminant   Bronch BAL 2/21 4+ candida, few colonies MDR Pseudomonas  MRSA neg 2/18  BAL COVID (+) 2/16  PCP DFA neg 2/16  Sputum 2/9 - Stenotrophomonas      RADIOGRAPHS - images personally reviewed  CXR 3/10 - persistent bilateral alveolar infiltrates, mild increase on right vs technique changes    KUB 3/10 - no free air or AFL    CXR 3/9 - T/L good position, bilateral alveolar infiltrates with some noted improvements on right, no PTX     CXR 3/7 - T/L good position, no PTX, R>L alveolar infiltrates     CT C/A/P 3/6 - increased ground glass infiltrates and scattered consolidations, edema and SQ qamar around RLQ ostomy site     CT head 3/6 - no acute mass/bleed/shift, post operative changes left temporal lobectomy, AFL in sinuses     CXR 2/28 - T/L good position, R>L alveolar infiltrates, no PTX, no dense consolidation     Upper Ext US 2/26 - neg for  DVT, (+) superficial thrombophlebitis b/l     CT angio 2/26 - no PE, persistent bilateral ground glass infiltrates with some mild improvement from priori scans, bibasilar consolidations, no sig effusions, small ascites, no new intra-abdominal pathology     TTE 2/2024 - EF 70%, grade I diastolic dysfunction     Assessment  Acute hypoxic respiratory failure - extubated 3/1  Abnormal CT chest - persistent COVID pneumonitis in setting of rituximab less likely autoimmune pneumonitis given negative serology testing  Stenotrophomonas/Pseudomonas pneumonia   Shock - mixed septic, hypovolemic - resolved  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 - noted stomal retraction/necrosis and cellulitis   Sinusitis   Toxic/metabolic encephalopathy with likely delirium component and anxiety  Thrombocytopenia, anemia   B-Cell lymphoma  Minimal SAH POA  Seizure disorder  Steroid induced hyperglycemia  Elevated TG  Hypernatremia - resolved  Hypokalemia  CKD, resolved ELIESER  Upper ext thrombophlebitis   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  Mildly elevated lactate     PLAN  NEURO - continue topamax and lamictal, resume effexor, prn oxycodone, prn olanzapine, monitor for w/d symptoms, cont low dose oral ativan  CARDIAC -  Goal MAP >65, remains off vasopressors, daily EKG QTc 414, trial 500cc isolyte bolus again, hold diureiss  PULM - continue midflow NC, remains on 60mg solumedrol TID for now - goal to wean steroids, holding further diuresis - monitor WOB, may need further BiPAP   GI - close stoma monitoring - appreciate surgery assistance, holding TFs for now given respiratory status and rising lactate/CRP  RENAL - give further isolyte boluses, increase free water, recheck BMP around 1800  ID - plan to complete repeat 10 day paxlovid and remdesivir, ID monitoring cycle times, continue cefepime/flagyl per ID for stomal cellulitis - add back vanc will d/w ID, repeat CT C/A/P, trend CRP, eval for possible  antiviral induced elevated lactate   ENDO - continue insulin gtt  HEME - B cell lymphoma, no active treatments, trend Hgb and plts  ICU Care - continue SCDs/LMWH, CHG, HOB >30deg, continue pepcid  Needs aggressive PT/OT to improve myopathy  I updated her  and daughter at bedside and all questions answered      Juvencio Kemp, DO

## 2024-03-10 NOTE — PROGRESS NOTES
Carla Hunt is a 59 y.o. female who is currently ordered Vancomycin IV with management by the Pharmacy Consult service.  Relevant clinical data and objective / subjective history reviewed.  Vancomycin Assessment:  Indication and Goal AUC/Trough: Other, Ostomy infection, -600, trough >10  Clinical Status: Critically ill  Micro:     Renal Function:  SCr: 0.59 mg/dL  CrCl: 104 mL/min  Renal replacement: Not on dialysis  Days of Therapy: Vanco total day 4 (patient did not receive any vanco on 3/9).   Current Dose: Reloaded with 1750 mg x 1 dose  Vancomycin Plan:  New Dosing: Following load start 750 mg IV q12h  Estimated AUC: 490 mcg*hr/mL  Estimated Trough: 14.2 mcg/mL  Next Level: 3/11 AM labs   Renal Function Monitoring: Daily BMP and UOP  Pharmacy will continue to follow closely for s/sx of nephrotoxicity, infusion reactions and appropriateness of therapy.  BMP and CBC will be ordered per protocol. We will continue to follow the patient’s culture results and clinical progress daily.    Shahriar Mane, Pharmacist

## 2024-03-11 LAB
ANION GAP SERPL CALCULATED.3IONS-SCNC: 11 MMOL/L
ANION GAP SERPL CALCULATED.3IONS-SCNC: 11 MMOL/L
ATRIAL RATE: 101 BPM
BASE EXCESS BLDA CALC-SCNC: -2.9 MMOL/L
BASE EXCESS BLDA CALC-SCNC: -5.8 MMOL/L
BASE EXCESS BLDA CALC-SCNC: -7.1 MMOL/L
BASE EXCESS BLDA CALC-SCNC: -8.1 MMOL/L
BASE EXCESS BLDA CALC-SCNC: -8.7 MMOL/L
BUN SERPL-MCNC: 37 MG/DL (ref 5–25)
BUN SERPL-MCNC: 39 MG/DL (ref 5–25)
CALCIUM SERPL-MCNC: 8.3 MG/DL (ref 8.4–10.2)
CALCIUM SERPL-MCNC: 8.3 MG/DL (ref 8.4–10.2)
CHLORIDE SERPL-SCNC: 115 MMOL/L (ref 96–108)
CHLORIDE SERPL-SCNC: 116 MMOL/L (ref 96–108)
CO2 SERPL-SCNC: 21 MMOL/L (ref 21–32)
CO2 SERPL-SCNC: 22 MMOL/L (ref 21–32)
CREAT SERPL-MCNC: 0.65 MG/DL (ref 0.6–1.3)
CREAT SERPL-MCNC: 0.69 MG/DL (ref 0.6–1.3)
CRP SERPL QL: 166.1 MG/L
ERYTHROCYTE [DISTWIDTH] IN BLOOD BY AUTOMATED COUNT: 22 % (ref 11.6–15.1)
GFR SERPL CREATININE-BSD FRML MDRD: 96 ML/MIN/1.73SQ M
GFR SERPL CREATININE-BSD FRML MDRD: 98 ML/MIN/1.73SQ M
GLUCOSE SERPL-MCNC: 106 MG/DL (ref 65–140)
GLUCOSE SERPL-MCNC: 109 MG/DL (ref 65–140)
GLUCOSE SERPL-MCNC: 116 MG/DL (ref 65–140)
GLUCOSE SERPL-MCNC: 134 MG/DL (ref 65–140)
GLUCOSE SERPL-MCNC: 136 MG/DL (ref 65–140)
GLUCOSE SERPL-MCNC: 137 MG/DL (ref 65–140)
GLUCOSE SERPL-MCNC: 140 MG/DL (ref 65–140)
GLUCOSE SERPL-MCNC: 168 MG/DL (ref 65–140)
GLUCOSE SERPL-MCNC: 176 MG/DL (ref 65–140)
GLUCOSE SERPL-MCNC: 180 MG/DL (ref 65–140)
GLUCOSE SERPL-MCNC: 234 MG/DL (ref 65–140)
GLUCOSE SERPL-MCNC: 238 MG/DL (ref 65–140)
GLUCOSE SERPL-MCNC: 243 MG/DL (ref 65–140)
GLUCOSE SERPL-MCNC: 260 MG/DL (ref 65–140)
HCO3 BLDA-SCNC: 20 MMOL/L (ref 22–28)
HCO3 BLDA-SCNC: 20.1 MMOL/L (ref 22–28)
HCO3 BLDA-SCNC: 20.7 MMOL/L (ref 22–28)
HCO3 BLDA-SCNC: 20.8 MMOL/L (ref 22–28)
HCO3 BLDA-SCNC: 21.4 MMOL/L (ref 22–28)
HCT VFR BLD AUTO: 23.3 % (ref 34.8–46.1)
HGB BLD-MCNC: 7.2 G/DL (ref 11.5–15.4)
HOROWITZ INDEX BLDA+IHG-RTO: 70 MM[HG]
HOROWITZ INDEX BLDA+IHG-RTO: 80 MM[HG]
HOROWITZ INDEX BLDA+IHG-RTO: 80 MM[HG]
LAMOTRIGINE SERPL-MCNC: 9.5 UG/ML (ref 2–20)
MAGNESIUM SERPL-MCNC: 2.2 MG/DL (ref 1.9–2.7)
MCH RBC QN AUTO: 30.3 PG (ref 26.8–34.3)
MCHC RBC AUTO-ENTMCNC: 30.9 G/DL (ref 31.4–37.4)
MCV RBC AUTO: 98 FL (ref 82–98)
O2 CT BLDA-SCNC: 10.6 ML/DL (ref 16–23)
O2 CT BLDA-SCNC: 10.8 ML/DL (ref 16–23)
O2 CT BLDA-SCNC: 9.6 ML/DL (ref 16–23)
O2 CT BLDA-SCNC: 9.6 ML/DL (ref 16–23)
O2 CT BLDA-SCNC: 9.8 ML/DL (ref 16–23)
OXYHGB MFR BLDA: 91.6 % (ref 94–97)
OXYHGB MFR BLDA: 94.6 % (ref 94–97)
OXYHGB MFR BLDA: 96.4 % (ref 94–97)
OXYHGB MFR BLDA: 97.2 % (ref 94–97)
OXYHGB MFR BLDA: 98 % (ref 94–97)
P AXIS: 54 DEGREES
PCO2 BLDA: 28.2 MM HG (ref 36–44)
PCO2 BLDA: 52.9 MM HG (ref 36–44)
PCO2 BLDA: 55.6 MM HG (ref 36–44)
PCO2 BLDA: 56.6 MM HG (ref 36–44)
PCO2 BLDA: 68.7 MM HG (ref 36–44)
PEEP RESPIRATORY: 8 CM[H2O]
PH BLDA: 7.1 [PH] (ref 7.35–7.45)
PH BLDA: 7.17 [PH] (ref 7.35–7.45)
PH BLDA: 7.19 [PH] (ref 7.35–7.45)
PH BLDA: 7.22 [PH] (ref 7.35–7.45)
PH BLDA: 7.47 [PH] (ref 7.35–7.45)
PLATELET # BLD AUTO: 283 THOUSANDS/UL (ref 149–390)
PMV BLD AUTO: 12.4 FL (ref 8.9–12.7)
PO2 BLDA: 102.5 MM HG (ref 75–129)
PO2 BLDA: 112.5 MM HG (ref 75–129)
PO2 BLDA: 147.8 MM HG (ref 75–129)
PO2 BLDA: 166.2 MM HG (ref 75–129)
PO2 BLDA: 63.3 MM HG (ref 75–129)
POTASSIUM SERPL-SCNC: 4.1 MMOL/L (ref 3.5–5.3)
POTASSIUM SERPL-SCNC: 4.3 MMOL/L (ref 3.5–5.3)
PR INTERVAL: 138 MS
PRESSURE CONTROL: 14
QRS AXIS: 15 DEGREES
QRSD INTERVAL: 68 MS
QT INTERVAL: 300 MS
QTC INTERVAL: 389 MS
RBC # BLD AUTO: 2.38 MILLION/UL (ref 3.81–5.12)
SARS-COV-2 RNA RESP QL NAA+PROBE: POSITIVE
SODIUM SERPL-SCNC: 147 MMOL/L (ref 135–147)
SODIUM SERPL-SCNC: 149 MMOL/L (ref 135–147)
SPECIMEN SOURCE: ABNORMAL
T WAVE AXIS: 83 DEGREES
VANCOMYCIN SERPL-MCNC: 26.7 UG/ML (ref 10–20)
VENT AC: 26
VENT AC: 26
VENT- AC: AC
VENTRICULAR RATE: 101 BPM
VT SETTING VENT: 360 ML
VT SETTING VENT: 360 ML
WBC # BLD AUTO: 12.13 THOUSAND/UL (ref 4.31–10.16)

## 2024-03-11 PROCEDURE — 94002 VENT MGMT INPAT INIT DAY: CPT

## 2024-03-11 PROCEDURE — 85027 COMPLETE CBC AUTOMATED: CPT | Performed by: STUDENT IN AN ORGANIZED HEALTH CARE EDUCATION/TRAINING PROGRAM

## 2024-03-11 PROCEDURE — 88185 FLOWCYTOMETRY/TC ADD-ON: CPT | Performed by: INTERNAL MEDICINE

## 2024-03-11 PROCEDURE — 87102 FUNGUS ISOLATION CULTURE: CPT | Performed by: INTERNAL MEDICINE

## 2024-03-11 PROCEDURE — 4A133J1 MONITORING OF ARTERIAL PULSE, PERIPHERAL, PERCUTANEOUS APPROACH: ICD-10-PCS | Performed by: INTERNAL MEDICINE

## 2024-03-11 PROCEDURE — 87106 FUNGI IDENTIFICATION YEAST: CPT | Performed by: INTERNAL MEDICINE

## 2024-03-11 PROCEDURE — 87070 CULTURE OTHR SPECIMN AEROBIC: CPT | Performed by: INTERNAL MEDICINE

## 2024-03-11 PROCEDURE — 82805 BLOOD GASES W/O2 SATURATION: CPT

## 2024-03-11 PROCEDURE — 0BCB8ZZ EXTIRPATION OF MATTER FROM LEFT LOWER LOBE BRONCHUS, VIA NATURAL OR ARTIFICIAL OPENING ENDOSCOPIC: ICD-10-PCS | Performed by: INTERNAL MEDICINE

## 2024-03-11 PROCEDURE — 4A133B1 MONITORING OF ARTERIAL PRESSURE, PERIPHERAL, PERCUTANEOUS APPROACH: ICD-10-PCS | Performed by: INTERNAL MEDICINE

## 2024-03-11 PROCEDURE — 87181 SC STD AGAR DILUTION PER AGT: CPT | Performed by: INTERNAL MEDICINE

## 2024-03-11 PROCEDURE — 82805 BLOOD GASES W/O2 SATURATION: CPT | Performed by: STUDENT IN AN ORGANIZED HEALTH CARE EDUCATION/TRAINING PROGRAM

## 2024-03-11 PROCEDURE — 0BC68ZZ EXTIRPATION OF MATTER FROM RIGHT LOWER LOBE BRONCHUS, VIA NATURAL OR ARTIFICIAL OPENING ENDOSCOPIC: ICD-10-PCS | Performed by: INTERNAL MEDICINE

## 2024-03-11 PROCEDURE — 80202 ASSAY OF VANCOMYCIN: CPT | Performed by: STUDENT IN AN ORGANIZED HEALTH CARE EDUCATION/TRAINING PROGRAM

## 2024-03-11 PROCEDURE — 87252 VIRUS INOCULATION TISSUE: CPT | Performed by: INTERNAL MEDICINE

## 2024-03-11 PROCEDURE — 82948 REAGENT STRIP/BLOOD GLUCOSE: CPT

## 2024-03-11 PROCEDURE — 99233 SBSQ HOSP IP/OBS HIGH 50: CPT | Performed by: PHYSICIAN ASSISTANT

## 2024-03-11 PROCEDURE — 36600 WITHDRAWAL OF ARTERIAL BLOOD: CPT

## 2024-03-11 PROCEDURE — 80048 BASIC METABOLIC PNL TOTAL CA: CPT

## 2024-03-11 PROCEDURE — 87186 SC STD MICRODIL/AGAR DIL: CPT | Performed by: INTERNAL MEDICINE

## 2024-03-11 PROCEDURE — 31500 INSERT EMERGENCY AIRWAY: CPT | Performed by: INTERNAL MEDICINE

## 2024-03-11 PROCEDURE — 0BH17EZ INSERTION OF ENDOTRACHEAL AIRWAY INTO TRACHEA, VIA NATURAL OR ARTIFICIAL OPENING: ICD-10-PCS | Performed by: INTERNAL MEDICINE

## 2024-03-11 PROCEDURE — 99291 CRITICAL CARE FIRST HOUR: CPT | Performed by: INTERNAL MEDICINE

## 2024-03-11 PROCEDURE — 87206 SMEAR FLUORESCENT/ACID STAI: CPT | Performed by: INTERNAL MEDICINE

## 2024-03-11 PROCEDURE — 93005 ELECTROCARDIOGRAM TRACING: CPT

## 2024-03-11 PROCEDURE — 88184 FLOWCYTOMETRY/ TC 1 MARKER: CPT | Performed by: INTERNAL MEDICINE

## 2024-03-11 PROCEDURE — 87635 SARS-COV-2 COVID-19 AMP PRB: CPT | Performed by: STUDENT IN AN ORGANIZED HEALTH CARE EDUCATION/TRAINING PROGRAM

## 2024-03-11 PROCEDURE — 03HY32Z INSERTION OF MONITORING DEVICE INTO UPPER ARTERY, PERCUTANEOUS APPROACH: ICD-10-PCS | Performed by: INTERNAL MEDICINE

## 2024-03-11 PROCEDURE — 87281 PNEUMOCYSTIS CARINII AG IF: CPT | Performed by: INTERNAL MEDICINE

## 2024-03-11 PROCEDURE — 87077 CULTURE AEROBIC IDENTIFY: CPT | Performed by: INTERNAL MEDICINE

## 2024-03-11 PROCEDURE — 86140 C-REACTIVE PROTEIN: CPT | Performed by: STUDENT IN AN ORGANIZED HEALTH CARE EDUCATION/TRAINING PROGRAM

## 2024-03-11 PROCEDURE — 83735 ASSAY OF MAGNESIUM: CPT | Performed by: STUDENT IN AN ORGANIZED HEALTH CARE EDUCATION/TRAINING PROGRAM

## 2024-03-11 PROCEDURE — 99233 SBSQ HOSP IP/OBS HIGH 50: CPT | Performed by: INTERNAL MEDICINE

## 2024-03-11 PROCEDURE — 87116 MYCOBACTERIA CULTURE: CPT | Performed by: INTERNAL MEDICINE

## 2024-03-11 PROCEDURE — 87635 SARS-COV-2 COVID-19 AMP PRB: CPT | Performed by: INTERNAL MEDICINE

## 2024-03-11 PROCEDURE — 94760 N-INVAS EAR/PLS OXIMETRY 1: CPT

## 2024-03-11 PROCEDURE — 93010 ELECTROCARDIOGRAM REPORT: CPT | Performed by: INTERNAL MEDICINE

## 2024-03-11 PROCEDURE — 94664 DEMO&/EVAL PT USE INHALER: CPT

## 2024-03-11 PROCEDURE — 87015 SPECIMEN INFECT AGNT CONCNTJ: CPT | Performed by: INTERNAL MEDICINE

## 2024-03-11 PROCEDURE — 87205 SMEAR GRAM STAIN: CPT | Performed by: INTERNAL MEDICINE

## 2024-03-11 PROCEDURE — 80048 BASIC METABOLIC PNL TOTAL CA: CPT | Performed by: STUDENT IN AN ORGANIZED HEALTH CARE EDUCATION/TRAINING PROGRAM

## 2024-03-11 PROCEDURE — 0BDB8ZX EXTRACTION OF LEFT LOWER LOBE BRONCHUS, VIA NATURAL OR ARTIFICIAL OPENING ENDOSCOPIC, DIAGNOSTIC: ICD-10-PCS | Performed by: INTERNAL MEDICINE

## 2024-03-11 PROCEDURE — NC001 PR NO CHARGE: Performed by: INTERNAL MEDICINE

## 2024-03-11 PROCEDURE — 94640 AIRWAY INHALATION TREATMENT: CPT

## 2024-03-11 RX ORDER — SODIUM CHLORIDE, SODIUM GLUCONATE, SODIUM ACETATE, POTASSIUM CHLORIDE, MAGNESIUM CHLORIDE, SODIUM PHOSPHATE, DIBASIC, AND POTASSIUM PHOSPHATE .53; .5; .37; .037; .03; .012; .00082 G/100ML; G/100ML; G/100ML; G/100ML; G/100ML; G/100ML; G/100ML
500 INJECTION, SOLUTION INTRAVENOUS ONCE
Status: COMPLETED | OUTPATIENT
Start: 2024-03-11 | End: 2024-03-11

## 2024-03-11 RX ORDER — PROPOFOL 10 MG/ML
5-50 INJECTION, EMULSION INTRAVENOUS
Status: DISCONTINUED | OUTPATIENT
Start: 2024-03-11 | End: 2024-03-16

## 2024-03-11 RX ORDER — DEXMEDETOMIDINE HYDROCHLORIDE 4 UG/ML
.1-.7 INJECTION, SOLUTION INTRAVENOUS
Status: DISCONTINUED | OUTPATIENT
Start: 2024-03-11 | End: 2024-03-11

## 2024-03-11 RX ORDER — FENTANYL CITRATE-0.9 % NACL/PF 10 MCG/ML
50 PLASTIC BAG, INJECTION (ML) INTRAVENOUS CONTINUOUS
Status: DISCONTINUED | OUTPATIENT
Start: 2024-03-11 | End: 2024-03-16

## 2024-03-11 RX ORDER — METHYLPREDNISOLONE SODIUM SUCCINATE 125 MG/2ML
60 INJECTION, POWDER, LYOPHILIZED, FOR SOLUTION INTRAMUSCULAR; INTRAVENOUS EVERY 6 HOURS SCHEDULED
Status: DISCONTINUED | OUTPATIENT
Start: 2024-03-12 | End: 2024-03-11

## 2024-03-11 RX ORDER — EPINEPHRINE 0.1 MG/ML
INJECTION INTRAVENOUS
Status: DISPENSED
Start: 2024-03-11 | End: 2024-03-12

## 2024-03-11 RX ORDER — METHYLPREDNISOLONE SODIUM SUCCINATE 125 MG/2ML
60 INJECTION, POWDER, LYOPHILIZED, FOR SOLUTION INTRAMUSCULAR; INTRAVENOUS EVERY 8 HOURS SCHEDULED
Status: CANCELLED | OUTPATIENT
Start: 2024-03-11 | End: 2024-03-11

## 2024-03-11 RX ORDER — FUROSEMIDE 10 MG/ML
40 INJECTION INTRAMUSCULAR; INTRAVENOUS ONCE
Status: COMPLETED | OUTPATIENT
Start: 2024-03-11 | End: 2024-03-11

## 2024-03-11 RX ORDER — METHYLPREDNISOLONE SODIUM SUCCINATE 125 MG/2ML
60 INJECTION, POWDER, LYOPHILIZED, FOR SOLUTION INTRAMUSCULAR; INTRAVENOUS EVERY 8 HOURS SCHEDULED
Status: DISCONTINUED | OUTPATIENT
Start: 2024-03-11 | End: 2024-03-12

## 2024-03-11 RX ORDER — ALBUMIN, HUMAN INJ 5% 5 %
25 SOLUTION INTRAVENOUS ONCE
Status: COMPLETED | OUTPATIENT
Start: 2024-03-11 | End: 2024-03-11

## 2024-03-11 RX ORDER — METHYLPREDNISOLONE SODIUM SUCCINATE 125 MG/2ML
60 INJECTION, POWDER, LYOPHILIZED, FOR SOLUTION INTRAMUSCULAR; INTRAVENOUS EVERY 6 HOURS SCHEDULED
Status: DISCONTINUED | OUTPATIENT
Start: 2024-03-12 | End: 2024-03-13

## 2024-03-11 RX ORDER — VANCOMYCIN HYDROCHLORIDE 500 MG/100ML
500 INJECTION, SOLUTION INTRAVENOUS EVERY 12 HOURS
Status: DISCONTINUED | OUTPATIENT
Start: 2024-03-11 | End: 2024-03-12

## 2024-03-11 RX ADMIN — METRONIDAZOLE 500 MG: 500 TABLET ORAL at 22:06

## 2024-03-11 RX ADMIN — FAMOTIDINE 20 MG: 20 TABLET, FILM COATED ORAL at 08:12

## 2024-03-11 RX ADMIN — CEFEPIME 2000 MG: 2 INJECTION, POWDER, FOR SOLUTION INTRAVENOUS at 17:16

## 2024-03-11 RX ADMIN — VANCOMYCIN HYDROCHLORIDE 750 MG: 750 INJECTION, SOLUTION INTRAVENOUS at 07:08

## 2024-03-11 RX ADMIN — VASOPRESSIN 0.04 UNITS/MIN: 20 INJECTION INTRAVENOUS at 16:55

## 2024-03-11 RX ADMIN — TOPIRAMATE 100 MG: 100 TABLET, FILM COATED ORAL at 18:23

## 2024-03-11 RX ADMIN — METRONIDAZOLE 500 MG: 500 TABLET ORAL at 13:24

## 2024-03-11 RX ADMIN — METHYLPREDNISOLONE SODIUM SUCCINATE 60 MG: 125 INJECTION, POWDER, FOR SOLUTION INTRAMUSCULAR; INTRAVENOUS at 06:02

## 2024-03-11 RX ADMIN — SODIUM CHLORIDE 250 MG: 0.9 INJECTION, SOLUTION INTRAVENOUS at 11:45

## 2024-03-11 RX ADMIN — Medication 2 SPRAY: at 18:23

## 2024-03-11 RX ADMIN — NIRMATRELVIR AND RITONAVIR 3 TABLET: KIT at 10:13

## 2024-03-11 RX ADMIN — NOREPINEPHRINE BITARTRATE 14 MCG/MIN: 1 SOLUTION INTRAVENOUS at 20:05

## 2024-03-11 RX ADMIN — IPRATROPIUM BROMIDE 0.5 MG: 0.5 SOLUTION RESPIRATORY (INHALATION) at 19:06

## 2024-03-11 RX ADMIN — SODIUM CHLORIDE, SODIUM GLUCONATE, SODIUM ACETATE, POTASSIUM CHLORIDE, MAGNESIUM CHLORIDE, SODIUM PHOSPHATE, DIBASIC, AND POTASSIUM PHOSPHATE 500 ML: .53; .5; .37; .037; .03; .012; .00082 INJECTION, SOLUTION INTRAVENOUS at 17:16

## 2024-03-11 RX ADMIN — LEVALBUTEROL HYDROCHLORIDE 1.25 MG: 1.25 SOLUTION RESPIRATORY (INHALATION) at 19:05

## 2024-03-11 RX ADMIN — ENOXAPARIN SODIUM 40 MG: 40 INJECTION SUBCUTANEOUS at 08:11

## 2024-03-11 RX ADMIN — METHYLPREDNISOLONE SODIUM SUCCINATE 60 MG: 125 INJECTION, POWDER, FOR SOLUTION INTRAMUSCULAR; INTRAVENOUS at 22:06

## 2024-03-11 RX ADMIN — CEFEPIME 2000 MG: 2 INJECTION, POWDER, FOR SOLUTION INTRAVENOUS at 08:38

## 2024-03-11 RX ADMIN — POLYETHYLENE GLYCOL 3350 17 G: 17 POWDER, FOR SOLUTION ORAL at 08:12

## 2024-03-11 RX ADMIN — LEVALBUTEROL HYDROCHLORIDE 1.25 MG: 1.25 SOLUTION RESPIRATORY (INHALATION) at 07:33

## 2024-03-11 RX ADMIN — IPRATROPIUM BROMIDE 0.5 MG: 0.5 SOLUTION RESPIRATORY (INHALATION) at 07:33

## 2024-03-11 RX ADMIN — ALBUMIN (HUMAN) 25 G: 12.5 INJECTION, SOLUTION INTRAVENOUS at 16:40

## 2024-03-11 RX ADMIN — FUROSEMIDE 40 MG: 10 INJECTION, SOLUTION INTRAMUSCULAR; INTRAVENOUS at 11:13

## 2024-03-11 RX ADMIN — CHLORHEXIDINE GLUCONATE 0.12% ORAL RINSE 15 ML: 1.2 LIQUID ORAL at 20:00

## 2024-03-11 RX ADMIN — CEFEPIME 2000 MG: 2 INJECTION, POWDER, FOR SOLUTION INTRAVENOUS at 01:00

## 2024-03-11 RX ADMIN — CEFEPIME 2000 MG: 2 INJECTION, POWDER, FOR SOLUTION INTRAVENOUS at 23:05

## 2024-03-11 RX ADMIN — FAMOTIDINE 20 MG: 20 TABLET, FILM COATED ORAL at 18:22

## 2024-03-11 RX ADMIN — NYSTATIN: 100000 POWDER TOPICAL at 18:24

## 2024-03-11 RX ADMIN — LEVALBUTEROL HYDROCHLORIDE 1.25 MG: 1.25 SOLUTION RESPIRATORY (INHALATION) at 13:58

## 2024-03-11 RX ADMIN — HYDRALAZINE HYDROCHLORIDE 10 MG: 20 INJECTION, SOLUTION INTRAMUSCULAR; INTRAVENOUS at 11:26

## 2024-03-11 RX ADMIN — NOREPINEPHRINE BITARTRATE 5 MCG/MIN: 1 SOLUTION INTRAVENOUS at 12:45

## 2024-03-11 RX ADMIN — REMDESIVIR 100 MG: 100 INJECTION, POWDER, LYOPHILIZED, FOR SOLUTION INTRAVENOUS at 14:35

## 2024-03-11 RX ADMIN — NYSTATIN: 100000 POWDER TOPICAL at 08:23

## 2024-03-11 RX ADMIN — Medication 25 MCG/HR: at 13:04

## 2024-03-11 RX ADMIN — IPRATROPIUM BROMIDE 0.5 MG: 0.5 SOLUTION RESPIRATORY (INHALATION) at 13:58

## 2024-03-11 RX ADMIN — DEXMEDETOMIDINE HYDROCHLORIDE 0.1 MCG/KG/HR: 4 INJECTION, SOLUTION INTRAVENOUS at 11:18

## 2024-03-11 RX ADMIN — Medication 2 SPRAY: at 23:02

## 2024-03-11 RX ADMIN — Medication 2 SPRAY: at 02:11

## 2024-03-11 RX ADMIN — LAMOTRIGINE 150 MG: 100 TABLET ORAL at 18:23

## 2024-03-11 RX ADMIN — OXYCODONE HYDROCHLORIDE 5 MG: 5 SOLUTION ORAL at 08:11

## 2024-03-11 RX ADMIN — PROPOFOL 5 MCG/KG/MIN: 10 INJECTION, EMULSION INTRAVENOUS at 12:45

## 2024-03-11 RX ADMIN — NIRMATRELVIR AND RITONAVIR 3 TABLET: KIT at 18:23

## 2024-03-11 RX ADMIN — VANCOMYCIN HYDROCHLORIDE 500 MG: 500 INJECTION, SOLUTION INTRAVENOUS at 18:20

## 2024-03-11 RX ADMIN — VASOPRESSIN 0.04 UNITS/MIN: 20 INJECTION INTRAVENOUS at 23:14

## 2024-03-11 RX ADMIN — LAMOTRIGINE 150 MG: 100 TABLET ORAL at 08:12

## 2024-03-11 RX ADMIN — SODIUM CHLORIDE 15 UNITS/HR: 9 INJECTION, SOLUTION INTRAVENOUS at 15:08

## 2024-03-11 RX ADMIN — PROPOFOL 35 MCG/KG/MIN: 10 INJECTION, EMULSION INTRAVENOUS at 23:20

## 2024-03-11 RX ADMIN — SODIUM CHLORIDE 4 UNITS/HR: 9 INJECTION, SOLUTION INTRAVENOUS at 00:35

## 2024-03-11 RX ADMIN — PROPOFOL 50 MCG/KG/MIN: 10 INJECTION, EMULSION INTRAVENOUS at 18:14

## 2024-03-11 RX ADMIN — VENLAFAXINE 25 MG: 25 TABLET ORAL at 08:21

## 2024-03-11 RX ADMIN — METRONIDAZOLE 500 MG: 500 TABLET ORAL at 06:01

## 2024-03-11 RX ADMIN — TOPIRAMATE 100 MG: 100 TABLET, FILM COATED ORAL at 08:34

## 2024-03-11 RX ADMIN — CHLORHEXIDINE GLUCONATE 0.12% ORAL RINSE 15 ML: 1.2 LIQUID ORAL at 08:11

## 2024-03-11 NOTE — RESPIRATORY THERAPY NOTE
Resp Therapy   03/11/24 0800   Respiratory Assessment   Resp Comments Pt placed on Bipap for WOB, pt labored and tachypneic. ABG drawn from right radial artery. Pt placed on 16/6 60%.   Non-Invasive Information   O2 Interface Device Face mask   Non-Invasive Ventilation Mode BiPAP   $ Continous NIV Initial   Non-Invasive Settings   FiO2 (%) 60   IPAP (cm) 14 cm   EPAP (cm) 6 cm   Rate (Set) 8   Pressure Support (cm H2O) 8   Non-Invasive Readings   Total Rate 48   MV (Mech) 19.2   Spontaneous Vt (mL) 565

## 2024-03-11 NOTE — PLAN OF CARE
Problem: Prexisting or High Potential for Compromised Skin Integrity  Goal: Skin integrity is maintained or improved  Description: INTERVENTIONS:  - Identify patients at risk for skin breakdown  - Assess and monitor skin integrity  - Assess and monitor nutrition and hydration status  - Monitor labs   - Assess for incontinence   - Turn and reposition patient  - Assist with mobility/ambulation  - Relieve pressure over bony prominences  - Avoid friction and shearing  - Provide appropriate hygiene as needed including keeping skin clean and dry  - Evaluate need for skin moisturizer/barrier cream  - Collaborate with interdisciplinary team   - Patient/family teaching  - Consider wound care consult   Outcome: Progressing     Problem: Nutrition/Hydration-ADULT  Goal: Nutrient/Hydration intake appropriate for improving, restoring or maintaining nutritional needs  Description: Monitor and assess patient's nutrition/hydration status for malnutrition. Collaborate with interdisciplinary team and initiate plan and interventions as ordered.  Monitor patient's weight and dietary intake as ordered or per policy. Utilize nutrition screening tool and intervene as necessary. Determine patient's food preferences and provide high-protein, high-caloric foods as appropriate.     INTERVENTIONS:  - Monitor oral intake, urinary output, labs, and treatment plans  - Assess nutrition and hydration status and recommend course of action  - Evaluate amount of meals eaten  - Assist patient with eating if necessary   - Allow adequate time for meals  - Recommend/ encourage appropriate diets, oral nutritional supplements, and vitamin/mineral supplements  - Order, calculate, and assess calorie counts as needed  - Recommend, monitor, and adjust tube feedings and TPN/PPN based on assessed needs  - Assess need for intravenous fluids  - Provide specific nutrition/hydration education as appropriate  - Include patient/family/caregiver in decisions related to  nutrition  Outcome: Progressing     Problem: SAFETY,RESTRAINT: NV/NON-SELF DESTRUCTIVE BEHAVIOR  Goal: Remains free of harm/injury (restraint for non violent/non self-detsructive behavior)  Description: INTERVENTIONS:  - Instruct patient/family regarding restraint use   - Assess and monitor physiologic and psychological status   - Provide interventions and comfort measures to meet assessed patient needs   - Identify and implement measures to help patient regain control  - Assess readiness for release of restraint   Outcome: Progressing  Goal: Returns to optimal restraint-free functioning  Description: INTERVENTIONS:  - Assess the patient's behavior and symptoms that indicate continued need for restraint  - Identify and implement measures to help patient regain control  - Assess readiness for release of restraint   Outcome: Progressing     Problem: INFECTION - ADULT  Goal: Absence or prevention of progression during hospitalization  Description: INTERVENTIONS:  - Assess and monitor for signs and symptoms of infection  - Monitor lab/diagnostic results  - Monitor all insertion sites, i.e. indwelling lines, tubes, and drains  - Monitor endotracheal if appropriate and nasal secretions for changes in amount and color  - Tyler appropriate cooling/warming therapies per order  - Administer medications as ordered  - Instruct and encourage patient and family to use good hand hygiene technique  - Identify and instruct in appropriate isolation precautions for identified infection/condition  Outcome: Progressing     Problem: SAFETY ADULT  Goal: Patient will remain free of falls  Description: INTERVENTIONS:  - Educate patient/family on patient safety including physical limitations  - Instruct patient to call for assistance with activity   - Consult OT/PT to assist with strengthening/mobility   - Keep Call bell within reach  - Keep bed low and locked with side rails adjusted as appropriate  - Keep care items and personal  belongings within reach  - Initiate and maintain comfort rounds  - Make Fall Risk Sign visible to staff  - Offer Toileting every 2 Hours, in advance of need  - Initiate/Maintain bed alarm  - Obtain necessary fall risk management equipment: non skid footwear  - Apply yellow socks and bracelet for high fall risk patients  - Consider moving patient to room near nurses station  Outcome: Progressing     Problem: RESPIRATORY - ADULT  Goal: Achieves optimal ventilation and oxygenation  Description: INTERVENTIONS:  - Assess for changes in respiratory status  - Assess for changes in mentation and behavior  - Position to facilitate oxygenation and minimize respiratory effort  - Oxygen administered by appropriate delivery if ordered  - Initiate smoking cessation education as indicated  - Encourage broncho-pulmonary hygiene including cough, deep breathe, Incentive Spirometry  - Assess the need for suctioning and aspirate as needed  - Assess and instruct to report SOB or any respiratory difficulty  - Respiratory Therapy support as indicated  Outcome: Not Progressing     Problem: SKIN/TISSUE INTEGRITY - ADULT  Goal: Skin Integrity remains intact(Skin Breakdown Prevention)  Description: Assess:  -Perform Flavio assessment every shift   -Clean and moisturize skin every day   -Inspect skin when repositioning, toileting, and assisting with ADLS  -Assess under medical devices such as ronny  every hour   -Assess extremities for adequate circulation and sensation     Bed Management:  -Have minimal linens on bed & keep smooth, unwrinkled  -Change linens as needed when moist or perspiring  -Avoid sitting or lying in one position for more than 2 hours while in bed  -Keep HOB at 45 degrees     Toileting:  -Offer bedside commode  -Assess for incontinence every hour  -Use incontinent care products after each incontinent episode such as moisture barrier cream     Activity:  -Mobilize patient 3 times a day  -Encourage activity and walks on  unit  -Encourage or provide ROM exercises   -Turn and reposition patient every 2 Hours  -Use appropriate equipment to lift or move patient in bed  -Instruct/ Assist with weight shifting every hour when out of bed in chair  -Consider limitation of chair time 2 hour intervals    Skin Care:  -Avoid use of baby powder, tape, friction and shearing, hot water or constrictive clothing  -Relieve pressure over bony prominences using allevyn   -Do not massage red bony areas    Next Steps:  -Teach patient strategies to minimize risks such as weight shifting    -Consider consults to  interdisciplinary teams such as PT  Outcome: Progressing  Goal: Incision(s), wounds(s) or drain site(s) healing without S/S of infection  Description: INTERVENTIONS  - Assess and document dressing, incision, wound bed, drain sites and surrounding tissue  - Provide patient and family education  Outcome: Progressing  Goal: Pressure injury heals and does not worsen  Description: Interventions:  - Implement low air loss mattress or specialty surface (Criteria met)  - Apply silicone foam dressing  - Apply fecal or urinary incontinence containment device   - Utilize friction reducing device or surface for transfers   - Consider nutrition services referral as needed  Outcome: Progressing     Problem: NEUROSENSORY - ADULT  Goal: Achieves stable or improved neurological status  Description: INTERVENTIONS  - Monitor and report changes in neurological status  - Monitor vital signs such as temperature, blood pressure, glucose, and any other labs ordered   - Initiate measures to prevent increased intracranial pressure  - Monitor for seizure activity and implement precautions if appropriate      Outcome: Progressing  Goal: Achieves maximal functionality and self care  Description: INTERVENTIONS  - Monitor swallowing and airway patency with patient fatigue and changes in neurological status  - Encourage and assist patient to increase activity and self care.   -  Encourage visually impaired, hearing impaired and aphasic patients to use assistive/communication devices  Outcome: Progressing     Problem: CARDIOVASCULAR - ADULT  Goal: Maintains optimal cardiac output and hemodynamic stability  Description: INTERVENTIONS:  - Monitor I/O, vital signs and rhythm  - Monitor for S/S and trends of decreased cardiac output  - Administer and titrate ordered vasoactive medications to optimize hemodynamic stability  - Assess quality of pulses, skin color and temperature  - Assess for signs of decreased coronary artery perfusion  - Instruct patient to report change in severity of symptoms  Outcome: Progressing  Goal: Absence of cardiac dysrhythmias or at baseline rhythm  Description: INTERVENTIONS:  - Continuous cardiac monitoring, vital signs, obtain 12 lead EKG if ordered  - Administer antiarrhythmic and heart rate control medications as ordered  - Monitor electrolytes and administer replacement therapy as ordered  Outcome: Progressing     Problem: GASTROINTESTINAL - ADULT  Goal: Minimal or absence of nausea and/or vomiting  Description: INTERVENTIONS:  - Administer IV fluids if ordered to ensure adequate hydration  - Maintain NPO status until nausea and vomiting are resolved  - Nasogastric tube if ordered  - Administer ordered antiemetic medications as needed  - Provide nonpharmacologic comfort measures as appropriate  - Advance diet as tolerated, if ordered  - Consider nutrition services referral to assist patient with adequate nutrition and appropriate food choices  Outcome: Progressing  Goal: Maintains or returns to baseline bowel function  Description: INTERVENTIONS:  - Assess bowel function  - Encourage oral fluids to ensure adequate hydration  - Administer IV fluids if ordered to ensure adequate hydration  - Administer ordered medications as needed  - Encourage mobilization and activity  - Consider nutritional services referral to assist patient with adequate nutrition and  appropriate food choices  Outcome: Progressing  Goal: Maintains adequate nutritional intake  Description: INTERVENTIONS:  - Monitor percentage of each meal consumed  - Identify factors contributing to decreased intake, treat as appropriate  - Assist with meals as needed  - Monitor I&O, weight, and lab values if indicated  - Obtain nutrition services referral as needed  Outcome: Progressing  Goal: Establish and maintain optimal ostomy function  Description: INTERVENTIONS:  - Assess bowel function  - Encourage oral fluids to ensure adequate hydration  - Administer IV fluids if ordered to ensure adequate hydration   - Administer ordered medications as needed  - Encourage mobilization and activity  - Nutrition services referral to assist patient with appropriate food choices  - Assess stoma site  - Consider wound care consult   Outcome: Progressing  Goal: Oral mucous membranes remain intact  Description: INTERVENTIONS  - Assess oral mucosa and hygiene practices  - Implement preventative oral hygiene regimen  - Implement oral medicated treatments as ordered  - Initiate Nutrition services referral as needed  Outcome: Progressing     Problem: GENITOURINARY - ADULT  Goal: Maintains or returns to baseline urinary function  Description: INTERVENTIONS:  - Assess urinary function  - Encourage oral fluids to ensure adequate hydration if ordered  - Administer IV fluids as ordered to ensure adequate hydration  - Administer ordered medications as needed  - Offer frequent toileting  - Follow urinary retention protocol if ordered  Outcome: Progressing  Goal: Urinary catheter remains patent  Description: INTERVENTIONS:  - Assess patency of urinary catheter  - If patient has a chronic marie, consider changing catheter if non-functioning  - Follow guidelines for intermittent irrigation of non-functioning urinary catheter  Outcome: Progressing     Problem: METABOLIC, FLUID AND ELECTROLYTES - ADULT  Goal: Electrolytes maintained within  normal limits  Description: INTERVENTIONS:  - Monitor labs and assess patient for signs and symptoms of electrolyte imbalances  - Administer electrolyte replacement as ordered  - Monitor response to electrolyte replacements, including repeat lab results as appropriate  - Instruct patient on fluid and nutrition as appropriate  Outcome: Progressing  Goal: Fluid balance maintained  Description: INTERVENTIONS:  - Monitor labs   - Monitor I/O and WT  - Instruct patient on fluid and nutrition as appropriate  - Assess for signs & symptoms of volume excess or deficit  Outcome: Progressing  Goal: Glucose maintained within target range  Description: INTERVENTIONS:  - Monitor Blood Glucose as ordered  - Assess for signs and symptoms of hyperglycemia and hypoglycemia  - Administer ordered medications to maintain glucose within target range  - Assess nutritional intake and initiate nutrition service referral as needed  Outcome: Progressing     Problem: HEMATOLOGIC - ADULT  Goal: Maintains hematologic stability  Description: INTERVENTIONS  - Assess for signs and symptoms of bleeding or hemorrhage  - Monitor labs  - Administer supportive blood products/factors as ordered and appropriate  Outcome: Progressing     Problem: MUSCULOSKELETAL - ADULT  Goal: Maintain or return mobility to safest level of function  Description: INTERVENTIONS:  - Assess patient's ability to carry out ADLs; assess patient's baseline for ADL function and identify physical deficits which impact ability to perform ADLs (bathing, care of mouth/teeth, toileting, grooming, dressing, etc.)  - Assess/evaluate cause of self-care deficits   - Assess range of motion  - Assess patient's mobility  - Assess patient's need for assistive devices and provide as appropriate  - Encourage maximum independence but intervene and supervise when necessary  - Involve family in performance of ADLs  - Assess for home care needs following discharge   - Consider OT consult to assist  with ADL evaluation and planning for discharge  - Provide patient education as appropriate  Outcome: Progressing     Problem: COPING  Goal: Pt/Family able to verbalize concerns and demonstrate effective coping strategies  Description: INTERVENTIONS:  - Assist patient/family to identify coping skills, available support systems and cultural and spiritual values  - Provide emotional support, including active listening and acknowledgement of concerns of patient and caregivers  - Reduce environmental stimuli, as able  - Provide patient education  - Assess for spiritual pain/suffering and initiate spiritual care, including notification of Pastoral Care or nat[place] based community as needed  - Assess effectiveness of coping strategies  Outcome: Progressing  Goal: Will report anxiety at manageable levels  Description: INTERVENTIONS:  - Administer medication as ordered  - Teach and encourage coping skills  - Provide emotional support  - Assess patient/family for anxiety and ability to cope  Outcome: Progressing     Problem: BEHAVIOR  Goal: Pt/Family maintain appropriate behavior and adhere to behavioral management agreement, if implemented  Description: INTERVENTIONS:  - Assess the family dynamic   - Encourage verbalization of thoughts and concerns in a socially appropriate manner  - Assess patient/family's coping skills and non-compliant behavior (including use of illegal substances).  - Utilize positive, consistent limit setting strategies supporting safety of patient, staff and others  - Initiate consult with Case Management, Spiritual Care or other ancillary services as appropriate  - If a patient's/visitor's behavior jeopardizes the safety of the patient, staff, or others, refer to organization procedure.   - Notify Security of behavior or suspected illegal substances which indicate the need for search of the patient and/or belongings  - Encourage participation in the decision making process about a behavioral management  agreement; implement if patient meets criteria  Outcome: Progressing     Problem: Potential for Falls  Goal: Patient will remain free of falls  Description: INTERVENTIONS:  - Educate patient/family on patient safety including physical limitations  - Instruct patient to call for assistance with activity   - Consult OT/PT to assist with strengthening/mobility   - Keep Call bell within reach  - Keep bed low and locked with side rails adjusted as appropriate  - Keep care items and personal belongings within reach  - Initiate and maintain comfort rounds  - Make Fall Risk Sign visible to staff  - Offer Toileting every 2 Hours, in advance of need  - Initiate/Maintain bed alarm  - Obtain necessary fall risk management equipment: non skid footwear  - Apply yellow socks and bracelet for high fall risk patients  - Consider moving patient to room near nurses station  Outcome: Progressing

## 2024-03-11 NOTE — PROCEDURES
Intubation    Date/Time: 1/10/2024 7:41 PM    Performed by: Nigel Escobar DO  Authorized by: Nigel Escobar DO    Patient location:  Bedside  Other Assisting Provider: Yes (comment)    Consent:     Consent obtained:  Verbal and emergent situation ( at bedside)    Consent given by:  Spouse    Risks discussed:  Aspiration, brain injury, laryngeal injury, pneumothorax, dental trauma, death, bleeding and hypoxia    Alternatives discussed:  No treatment  Universal protocol:     Immediately prior to procedure, a time out was called: yes      Patient identity confirmed:  Arm band, hospital-assigned identification number and provided demographic data  Pre-procedure details:     Patient status:  Altered mental status    Mallampati score:  1    Pretreatment medications:  Etomidate    Paralytics:  Succinylcholine  Indications:     Indications for intubation: respiratory distress, respiratory failure, airway protection and hypoxemia    Procedure details:     Preoxygenation:  Bag valve mask    CPR in progress: no      Intubation method:  Oral    Oral intubation technique:  Glidescope    Nasal intubation technique:  Fiber optic    Laryngoscope blade:  Mac 3    Tube size (mm):  7.5    Tube type:  Cuffed    Number of attempts:  1    Ventilation between attempts: no      Cricoid pressure: no      Tube visualized through cords: yes    Placement assessment:     ETT to lip:  24    ETT to teeth:  23    Tube secured with:  ETT escudero    Breath sounds:  Equal    Placement verification: chest rise, condensation, CXR verification, ETCO2 detector and capnography      CXR findings:  ETT in proper place    Ventilator settings:  VC 14/400/8/100%  Post-procedure details:     Patient tolerance of procedure:  Tolerated well, no immediate complications  Comments:      Attending present

## 2024-03-11 NOTE — PROCEDURES
Arterial Line Insertion    Date/Time: 1/10/2024 7:41 PM    Performed by: Nigel Escobar DO  Authorized by: Nigel Escobar DO    Patient location:  Bedside  Other Assisting Provider: No    Consent:     Consent obtained:  Written    Consent given by:  Spouse    Risks discussed:  Bleeding, ischemia, repeat procedure, pain and infection  Universal protocol:     Patient identity confirmed:  Hospital-assigned identification number, arm band and provided demographic data  Indications:     Indications: hemodynamic monitoring, multiple ABGs, continuous blood pressure monitoring and frequent labs / infusion    Pre-procedure details:     Skin preparation:  Chlorhexidine  Anesthesia (see MAR for exact dosages):     Anesthesia method:  None  Procedure details:     Laterality:  Left    Needle gauge:  20 G    Placement technique:  Percutaneous and ultrasound guided    Sterile ultrasound techniques: Sterile gel and sterile probe covers were used      Number of attempts:  1    Successful placement: yes      Transducer: waveform confirmed    Post-procedure details:     Post-procedure:  Sterile dressing applied and sutured    CMS:  Unable to assess    Patient tolerance of procedure:  Tolerated well, no immediate complications

## 2024-03-11 NOTE — PROGRESS NOTES
[facility]  Progress Note: Critical Care  Name: Carla Hunt 59 y.o. female I MRN: [id number]  Unit/Bed#: MICU 10 I Date of Admission: 1/10/2024   Date of Service: 3/11/2024 I Hospital Day: 61    Assessment/Plan     Patient is a 59-year-old female with history of B-cell lymphoma that was maintained on rituximab prior to arrival who initially presented with encephalopathy in setting of COVID-19 infection and has required protracted hospital course complicated by intermittent episodes of acute hypoxic respiratory failure in setting of persistent COVID-19 infection, recurrent bacterial pneumonia requiring antivirals and high-dose corticosteroid therapy.  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  Cellulitis surrounding ostomy site  Lactic acidosis, resolved  Critical illness polyneuropathy and weakness  Anemia requiring transfusion  History of B-cell lymphoma with treatment with Rituxan  Minimal SAH with no neurosurgical intervention indicated at the time  Seizure disorder s/p left temporal lobe partial resection  Anxiety  Critical illness myopathy  Steroid-induced hyperglycemia  Sacral ulcers bilaterally     Neuro:   Sedation: Off sedation  -Monitor for withdrawal in context of patients history of seizures     Diagnosis: Analgesia  -On oxycodone 2.5 mg and 5 mg every 4 hours PRN     Diagnosis: seizure disorder  -S/p partial left temporal lobe resection in 2007  -On Lamictal 150 bid and Topamax 100 bid  -Last  lamotrigine level from 03/02 at 10.7 (therapeutic)     Diagnosis: Anxiety  -Restarted effexor 25 mg QD     CV:   Diagnosis: Distributive shock, resolved  -Off pressors  -Maintain MAPs > 65 mmHg     Pulm:  Diagnosis: Acute hypoxic respiratory failure   Likely multifactorial due to COVID pneumonitis in setting of persistent COVID-19 infection and recurrent bacterial pneumonia  -Completed severe COVID pathway and 7 days CTX initially, followed by 5d course of Remdesevir in late February for Covid19 positive PCR from bronchoscopic cultures.  -S/P bronch 2/2, 02/16, 02/21  -PCP and AFB negative   -Legionella, viral, fungal cultures no growth to date  -CT scan 2/24: general improvement of areas of consolidation and some areas of groundglass opacification on the lungs, still with significant groundglass opacification especially in the lower lobes. Bilateral consolidations in lower lobes. No evidence of PE.  -Extubated 03/01. Was on bipap, then on HFNC and now MFNC.  -Has required high-dose corticosteroids therapy throughout her hospitalization, recently intensified 2/26 due to fevers.Also completed veletri treatment and 5 day course of IVIG completed 2/15  -Repeat COVID ag and PCR both positive on 3/3, cycle threshold of 26.8, suggestive of high viral replication  -Repeat COVID PCR positive 3/11.  -CRP trend 109.6 (03/07) > 36.9 (03/08) > 14.8 (03/09) > 112.1 (03/10) > 166 (3/11)    Plan:  -Currently on solumedrol  60 mg Q8H  -Daily CRP to guide slow steroid wean  -Continue remdesivir/Paxlovid per guidelines, continue till 3/15 for 14d course  -Continue ABX as below  -Will trial BiPAP due to increased WOB  -Repeat CXR  -ABG   -Will consider additional IV lasix      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  -Monitor output of ostomy pouch and Hemoglobin  -Stoma appearing slightly retracted, still having some output  -Surgery following, will keep them updated if any further  changes  -Monitor ostomy output  -Tube feeds running     -On famotidine 20 mg QD for GI prophylaxis and to reduce risk of upper GIB in this patient on significant amount of steroid regimen     :   Diagnosis: CKD III  -Baseline Cr appears to be 0.8-1.2  -UA unremarkable   -Upon chart review 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 Remains net positive 2.6L. No JVD. Will consider additional IV lasix today.     E: monitor and replete for goal K>4, P>3, Mg>2  N: On tube feeds with vital 1.5 50cc/hr with free water flushes 300 mL every 6 hours, on hold due to acute hypoxic respiratory failure     Heme/Onc:   Diagnosis: Anemia  Likely multifactorial in nature, acute in the setting of recent sepsis/inflammatory state complicated by chronic anemia due to her other history of lymphoma and CKD  -She did receive 1 unit transfusion on 02/29, another unit on 03/06  -Iron panel with ferritin 974, iron 10, TIBC 173  -Trend CBC, transfuse to maintain Hgb>7     Diagnosis: Thrombocytopenia, resolved.   -Will continue to monitor  -Monitor sites for bleeding     Diagnosis: B cell lymphoma  -Follows with heme/onc at [facility]  -S/P 6 cycles of chemotherapy   -Maintained on Rituxan for 2 year course PTA, started May 2022  -Last dose was 12/20, treatment currently on hold   -Leukopenic on admission but WBC now wnl     DVT ppx with lovenox     Endo:   Diagnosis: steroid hyperglycemia and diabetes mellitus type 2, A1c at 6.6 from 01/2024  -Goal -180  -BG range last 24hrs 140-200s  -On insulin drip     ID:   Diagnosis: Severe covid pneumonia and recurrent  bacterial pneumonia  -Completed severe COVID pathway with decadron (ended 1/22) and remdesivir (ended 1/20), also completed 7 days CTX on 1/15  --S/p bactrim and minocycline x5 days for stenotrophomonas on sputum culture (1/25), then on bactrim for PJP ppx due to C/f opportunistic infections given immunocompromised status on chemotherapy and recent steroid use  -Bronc on 2/2 - Cx w/o growth (initially resulted as 1+ alpha hemolytic strep), AFB & PCP negative, f/u fungal, legionella, and viral cultures   -Repeat sputum culture 2/9 with 4+ stenotrophomonas  -Previously completed Levaquin 14 day course of antibiotics for stenotrophomonas pneumonia with pseudomonas MDR susceptible to levaquin.  - CT c/a/p 3/10 with persistent bilateral groundglass and nodular consolidation with peripheral opacification at the R>L lung bases. Findings remain concerning for multi lobar infiltrates with possible superimposed COVID-19 opacities   -Will continue cefepime, trend CRP.   -COVID-19 positive, on remdesevir and paxlovid, plan for 14 course through 3/15    Diagnosis stoma-wall cellulitis  Partial cecal volvulus s/p right hemicolectomy with ostomy pouch 2/20  - On cefepime, day 8  - Added Flagyl for anaerobic coerage 500 mg Q8H started on 03/08, day 4.   - Vanomycin added 3/10 due to elevated CRP, lactate; day 2   - Gen dharmesh following, appreciate reccs; no plans for surgical intervention at this time     MSK/Skin:   -Wound care team following for bilateral sacral wounds       Disposition: Critical care    ICU Core Measures     A: Assess, Prevent, and Manage Pain Has pain been assessed? Yes  Need for changes to pain regimen? No   B: Both SAT/SAT  N/A   C: Choice of Sedation RASS Goal: -1 Drowsy or 0 Alert and Calm  Need for changes to sedation or analgesia regimen? No   D: Delirium CAM-ICU: Negative   E: Early Mobility  Plan for early mobility? Yes   F: Family Engagement Plan for family engagement today? Yes       Antibiotic  Review: Patient on appropriate coverage based on culture data.     Review of Invasive Devices:    Ortiz Plan: Continue for accurate I/O monitoring for 48 hours  Central access plan: Patient has multiple central venous catheters.       Prophylaxis:  VTE VTE covered by:  enoxaparin, Subcutaneous, 40 mg at 03/11/24 0811       Stress Ulcer  covered byfamotidine (PEPCID) tablet 20 mg [[id number]]        Significant 24hr Events     Hospital course: Patient is a 59-year-old female with history of B-cell lymphoma status post chemotherapy completed 1/2022, maintained on rituximab since 1/2022, epilepsy, CKD 3, anxiety, who initially presented at [facility] on 1/7 with encephalopathy, found to have COVID-19 infection, treated with remdesivir/Decadron/actemra.  Extensive neuro work-up including LP negative with exception of small subarachnoid hemorrhage not requiring intervention.  Was transferred to [facility]"" on 2/10 for video EEG that was unremarkable.  Since then, patient has had protracted hospital course complicated by repeated episodes of worsening hypoxic respiratory failure.  Status post multiple bronchoscopies 2/1, 2/16, 2/21.  Most recent BAL cultures positive for Pseudomonas, stenotrophomonas PNA, treated with 10-day course of levofloxacin (completed 2/28).  Intubated on 2/13 for acute hypoxic respiratory failure after steroid wean, was reinitiated on remdesivir course 2/20 to 2/25.  Also received treatment with Veletri, IVIG course (completed 2/15).  Course complicated by cecal volvulus status post R hemicolectomy with ostomy pouch creation 2/20.  Extubated 3/1, has had waxing and waning neurological status since and has been intermittently agitated which improves with as needed Ativan.  Also has had severe epistaxis in setting of mid flow/high flow O2 therapy, requiring transfusion for anemia, last transfused 3/6, total 3U PRBCs since admission  More recently, poor wound healing of ostomy noted concerning for  cellulitis at the ostomy site. Also noted to have developing right middle lobe pneumonia on 3/10 imaging, requiring broad-spectrum antibiotics with cefepime, vancomycin and Flagyl.  Has required high-dose corticosteroid therapy for suspected COVID pneumonitis vs drug-induced pneumonitis, steroids were recently intensified on 2/26 for fevers, elevated CRPs.  Repeat COVID PCR positive on 3/3, reinitiated on Paxlovid/remdesivir in addition to high-dose steroids.  Rituximab therapy on hold for B-cell lymphoma in setting of persistent COVID-19 infection.  Has required intermittent IV diuresis due to volume overload.    24hr events: Tfs were held due to respiratory status, rising lactate/CRP. Given further isolyte bolus 500cc. Vano added 3/10 in addition to cefepime and flagyl due to elevated lactate/CRP.  Lactate now cleared. Repeat CT c/a/p 3/10 with persistent ground glass opacities, new developing RML PNA.  Remains on midflow 15L, insulin gtt, IV steroids 60mg q8h. Noted to be hypokalemic with K 2.8, improved to 4.3 after repletion     Subjective     Review of Systems   Unable to perform ROS: Patient nonverbal          Objective                            Vitals I/O      Most Recent Min/Max in 24hrs   Temp 97.5 °F (36.4 °C) Temp  Min: 97.5 °F (36.4 °C)  Max: 98.2 °F (36.8 °C)   Pulse 99 Pulse  Min: 92  Max: 119   Resp (!) 54 Resp  Min: 28  Max: 72   /66 BP  Min: 107/79  Max: 151/81   O2 Sat 97 % SpO2  Min: 90 %  Max: 97 %     On BiPAP 14/6   Intake/Output Summary (Last 24 hours) at 3/11/2024 0835  Last data filed at 3/11/2024 0600  Gross per 24 hour   Intake 4238.23 ml   Output 1875 ml   Net 2363.23 ml       Diet Enteral/Parenteral; Tube Feeding No Oral Diet; Vital 1.5; Continuous; 50; Prosource Protein Liquid - One Packet; 300; Water; Every 6 hours    Invasive Monitoring           Physical Exam   Physical Exam  Vitals reviewed.   Eyes:      Pupils: Pupils are equal, round, and reactive to light.   Skin:      Findings: Lesion (ostomy site) present.   HENT:      Head: Atraumatic.      Nose: Nasogastric tube present. No epistaxis.      Mouth/Throat:      Mouth: Mucous membranes are dry.   Cardiovascular:      Rate and Rhythm: Tachycardia present.      Pulses: Normal pulses.   Musculoskeletal:         General: No swelling.   Abdominal: General: An ostomy site is present. Bowel sounds are normal. There is distension and ostomy site.     Tenderness: There is abdominal tenderness.      Comments: Ostomy with dark brown stool output, no drainage or purulent discharge from ostomy site   Constitutional:       Appearance: She is ill-appearing.   Pulmonary:      Effort: Tachypnea present.   Neurological:      Mental Status: She is calm.      Motor: Motor deficit (profound weakness of all extremities).      Comments: Nonverbal, follows simple commands with head nodding, withdraws to noxious stimuli   Genitourinary/Anorectal:  Ortiz present.          Diagnostic Studies      EKG: Sinus tachycardia  Left ventricular hypertrophy with repolarization abnormality  Abnormal ECG  When compared with ECG of 10-MAR-2024 06:09,  T wave inversion now evident in Inferior leads  Nonspecific T wave abnormality, worse in Anterolateral leads  Imaging:  I have personally reviewed pertinent reports.      CT c/a/p on 3/10:  1. Persistent bilateral groundglass and nodular consolidation with peripheral opacification at the right greater than left lung bases. Findings remain concerning for multi lobar infiltrates with possible superimposed COVID-19 opacities.  2. Status post ileocolectomy with a right lower quadrant ileostomy again exhibiting a patent stoma. Persistent inflammatory change and subcutaneous emphysema after recent intervention. No drainable collection.  3. Bilateral renal calcifications again suggestive of medullary sponge kidney with persistent mild right renal fullness but no evidence of distal obstructive pathology.     CTH 3/9:  -No acute  intracranial abnormality. Stable microangiopathic changes.  -Stable postoperative changes related to left temporal lobectomy.  -Redemonstrated air-fluid levels within bilateral sphenoid sinuses, left greater than right mastoid effusions and left middle ear effusion.      Medications:  Scheduled PRN   cefepime, 2,000 mg, Q8H  chlorhexidine, 15 mL, Q12H SARTHAK  enoxaparin, 40 mg, Q24H SARTHAK  famotidine, 20 mg, BID  ipratropium, 0.5 mg, TID  lamoTRIgine, 150 mg, BID  levalbuterol, 1.25 mg, TID  methylPREDNISolone sodium succinate, 60 mg, Q8H SARTHAK  metroNIDAZOLE, 500 mg, Q8H SARTHAK  nirmatrelvir & ritonavir, 3 tablet, BID  nystatin, , BID  polyethylene glycol, 17 g, Daily  remdesivir, 100 mg, Q24H  sodium chloride, 2 spray, Q4H  topiramate, 100 mg, BID  vancomycin, 500 mg, Q12H  venlafaxine, 25 mg, Daily      acetaminophen, 650 mg, Q6H PRN  hydrALAZINE, 10 mg, Q6H PRN  OLANZapine, 10 mg, HS PRN  ondansetron, 4 mg, Q6H PRN  oxyCODONE, 2.5 mg, Q4H PRN   Or  oxyCODONE, 5 mg, Q4H PRN  sodium chloride, 1 Application, Q1H PRN       Continuous    insulin regular (HumuLIN R,NovoLIN R) 1 Units/mL in sodium chloride 0.9 % 100 mL infusion, 0.3-21 Units/hr, Last Rate: 8 Units/hr (03/11/24 0816)      On TF 50cc/hr, 300cc flushes q6h     Labs:    CBC    Recent Labs     03/10/24  0513 03/11/24  0415   WBC 13.35* 12.13*   HGB 8.6* 7.2*   HCT 26.9* 23.3*   * 283     BMP    Recent Labs     03/11/24  0028 03/11/24  0415   SODIUM 149* 147   K 4.3 4.1   * 115*   CO2 22 21   AGAP 11 11   BUN 37* 39*   CREATININE 0.65 0.69   CALCIUM 8.3* 8.3*       Coags    No recent results       Additional Electrolytes  Recent Labs     03/10/24  0513 03/11/24  0415   MG 2.2 2.2          Blood Gas    Recent Labs     03/11/24  0749   PHART 7.471*   SUF2SVR 28.2*   PO2ART 63.3*   XMU0FTK 20.1*   BEART -2.9   SOURCE Radial, Right     Recent Labs     03/10/24  0534 03/10/24  0803 03/11/24  0749   PHVEN 7.391  --   --    QLL6ITW 39.2*  --   --    PO2VEN 40.3   --   --    JFM0PAL 23.3*  --   --    BEVEN -1.5  --   --    Z8OGBCS 73.3  --   --    SOURCE  --    < > Radial, Right    < > = values in this interval not displayed.    LFTs  Recent Labs     03/10/24  0513   ALT 31   AST 18   ALKPHOS 90   ALB 2.9*   TBILI 0.59         Infectious  Recent Labs     03/10/24  0513   PROCALCITONI 1.08*        CRP trend 109.6 (03/07) > 36.9 (03/08) > 14.8 (03/09) > 112.1 (03/10) > 166 (3/11) Glucose  Recent Labs     03/10/24  0513 03/10/24  1806 03/11/24  0028 03/11/24  0415   GLUC 176* 215* 116 238*               Joe Lazcano DO

## 2024-03-11 NOTE — PROGRESS NOTES
[facility]  Progress Note  Name: Carla Hunt I  MRN: [id number]  Unit/Bed#: MICU 10 I Date of Admission: 1/10/2024   Date of Service: 3/11/2024 I Hospital Day: 61    Assessment/Plan   Cecal volvulus (HCC)  Assessment & Plan  S/p ostomy. Surgery monitoring closely given concern of breakdown, bleeding. Remains productive however.    Goals of care, counseling/discussion  Assessment & Plan  Code Status: full - Level 1  Given increasing O2 requirements and respiratory distress revisited GO with primary team this morning. Spouse, Min, wishes to pursue steroid and lasix bolus and avoid intubation if possible. However, if indicated would opt for re-intubation with knowledge that she is high risk for requiring a trach due to prolonged intubation earlier this hospital stay. He is understandably concerned about risk for poor wound healing given current ostomy status.   Min remains disease directed with only limit of no prolonged nursing home stay. He understands that best case scenario she will need months of recovery.     Palliative care patient  Assessment & Plan  PSC, including MSW support, will follow closely to continue to provide supportive care as clinical situation evolves.   Decisional apparatus:  Patient is not competent on my exam today.  If competence is lost, patient's substitute decision maker would default to spouse by [place] Act 169.  Advance Directive / Living Will / POLST:  None on file, unable to complete    Teto marginal zone B-cell lymphoma (HCC)  Assessment & Plan  Previously on maintenance therapy    Anxiety state  Assessment & Plan  Continue Effexor  Olanzapine 10mg HS PRN  Primary team started on precedex    * Acute respiratory failure with hypoxia (HCC)  Assessment & Plan  Patient was extubated after prolonged intubation and weaned down to 6L O2 via NC end of last week. Now with escalating O2 requirements over weekend. Currently tachypneic on BiPAP  and  discussed likelihood of re-intubation as seen in goals of care A/P  Very careful steroid management per critical care team.  attributes clinical decline to steroid wean and requests bolus steroids.  Patient remains full code. Patient's  understands if she is re-intubated will need to readdress tracheostomy.            Interval history:       Patient had increased O2 requirements over the weekend ultimately resulting in BiPAP. Patient remains tachypneic, hypertensive, tachycardic. Went in room with critical care who provided medical update. Discussed high risk for re-intubation. Patient's  requested re-escalation of steroids but re-intubate if indicated. Critical care ordered bolus of steroids and lasix. Discussed patient is likely to require a trach if she is reintubated considering recent prolonged intubation and tenuous respiratory status. Min expressed understanding but expresses concern for poor wound healing (such as ostomy has prolonged healing with intermittent bleeding etc). Stayed at bedside with spouse to provide support following medical update.    MEDICATIONS / ALLERGIES:     all current active meds have been reviewed    No Known Allergies    OBJECTIVE:    Physical Exam  Physical Exam  HENT:      Head: Normocephalic and atraumatic.   Cardiovascular:      Rate and Rhythm: Tachycardia present.   Pulmonary:      Comments: On BiPAP, tachypneic   Abdominal:      Tenderness: There is no guarding.      Comments: NGT in place   Skin:     General: Skin is dry.   Neurological:      Mental Status: She is alert.      Comments: Intermittent wakefulness but unable to engage in conversation   Psychiatric:      Comments: Intermittently restless         Lab Results:   Results from last 7 days   Lab Units 03/11/24  0415 03/10/24  0513 03/09/24  0554   WBC Thousand/uL 12.13* 13.35* 21.99*  21.99*   HEMOGLOBIN g/dL 7.2* 8.6* 8.7*  8.7*   HEMATOCRIT % 23.3* 26.9* 27.8*  27.8*   PLATELETS  Thousands/uL 283 400* 419*  419*   MONO PCT %  --   --  1*   EOS PCT %  --   --  0     Results from last 7 days   Lab Units 03/11/24  0415 03/11/24  0028 03/10/24  1806 03/10/24  0513   POTASSIUM mmol/L 4.1 4.3 2.8* 4.0   CHLORIDE mmol/L 115* 116* 114* 112*   CO2 mmol/L 21 22 20* 24   BUN mg/dL 39* 37* 39* 38*   CREATININE mg/dL 0.69 0.65 0.64 0.59*   CALCIUM mg/dL 8.3* 8.3* 8.0* 8.7   ALK PHOS U/L  --   --   --  90   ALT U/L  --   --   --  31   AST U/L  --   --   --  18       Imaging Studies: reviewed pertinent studies   EKG, Pathology, and Other Studies: reviewed pertinent studies    Counseling / Coordination of Care    Total floor / unit time spent today 45 minutes. Greater than 50% of total time was spent with the patient and / or family counseling and / or coordination of care. A description of the counseling / coordination of care: time spent assessing patient, communicating with RN, primary team, discussing goals of care, providing support.

## 2024-03-11 NOTE — PROGRESS NOTES
Nutrition Follow-Up/Recommendations:    Reassessed estimated nutrition needs since pt has been reintubated.   Is also now receiving calories from propofol so current total calorie intake exceeds estimated needs.     Recommend decreasing Vital 1.5 to 35mL/hr and increase Prosource Liquid Protein to one packet BID.   This will provide 1380 kcals (1750 total kcals with current propofol), 87g protein, 762mL water including water for Prosource administration.     Adjustments in additional free water flushes per critical care.

## 2024-03-11 NOTE — PROGRESS NOTES
Spiritual Care Progress Note    3/11/2024  Patient: Carla Hunt : 1965  Admission Date & Time: 1/10/2024 1941  MRN: [id number] [facility]: [id number]       met with pt's  Min. Pt has been sick for a while and been in the hospital for two months. Pt was working with staff at this time and pt's  was waiting outside. Min was tearful and in distress, explaining his wife's condition and hospital stay. Expressed that they have two children who support them when they can. Encouraged Min to keep expressing his emotions and educated him on the role of Spiritual Care and how to ask a nurse for a  should anymore needs arise. No further needs were expressed at this time.    Chaplains still remain available.       24 1300   Clinical Encounter Type   Visited With Patient not available;Family;Health care provider                    Chaplaincy Interventions Utilized:   Empowerment: Clarified, confirmed, or reviewed information from treatment team , Normalized experience of patient/family, Provided anticipatory guidance, and Provided chaplaincy education    Exploration: Explored alternatives, Explored relational needs & resources, Facilitated story telling, and Identified, evaluated & reinforced appropriate coping strategies    Collaboration: Advocated for patient/family and Consulted with interdisciplinary team    Relationship Building: Cultivated a relationship of care and support, Listened empathically, Hospitality, and Provided silent and supportive presence    Chaplaincy Outcomes Achieved:  Expressed gratitude, Expressed peace, Identified meaningful connections, Identified priorities, Progressed toward acceptance, Progressed toward balance of responsibility & trust , Progressed toward focus on present, Tearfully processed emotions, and Verbally processed emotions    Spiritual Coping Strategies Utilized:   No spiritual coping

## 2024-03-11 NOTE — ASSESSMENT & PLAN NOTE
Code Status: full - Level 1  Given increasing O2 requirements and respiratory distress revisited GOC with primary team this morning. Spouse, Min, wishes to pursue steroid and lasix bolus and avoid intubation if possible. However, if indicated would opt for re-intubation with knowledge that she is high risk for requiring a trach due to prolonged intubation earlier this hospital stay. He is understandably concerned about risk for poor wound healing given current ostomy status.   Min remains disease directed with only limit of no prolonged nursing home stay. He understands that best case scenario she will need months of recovery.

## 2024-03-11 NOTE — UTILIZATION REVIEW
Continued Stay Review    Date: 3/9-3/11/24                          Current Patient Class: IP  Current Level of Care: Level 2 Stepdown    HPI:59 y.o. female initially admitted on 1/10     Assessment/Plan:     3/9: Pt remains on IV Remdesivir. IV solu medrol 60 mg q 8 hrs. Pt remains on midflow oxygen-had increased needs this am, reports she feels anxious, reported good pain control, no sputum production.    3/10: intermittently agitated yesterday, started low dose ativan with some improvements, repeat head CT obtained secondary to anisocoria which was unchanged, remained on midflow NC. Given further lasix 40mg overnight. Also given 500cc IVF for rising LA, noted rising CRP.  She denied pain or dyspnea this am by nodding head, denied abd pain.     3/11: Repeat Covid PCR positive today. Continue IV Remdesivir and Paxlovid until 3/15 for 14 day course. Will trial Bipap due to increased WOB. Will consider additional IV lasix. Continue IV solu medrol 60 mg q 8 hrs. Tube feeds on hold due to acute resp failure. Continue IV cefepime and vanco, po Flagyl.     Vital Signs:     Date/Time Temp Pulse Resp BP MAP (mmHg) SpO2 Calculated FIO2 (%) - Nasal Cannula O2 Flow Rate (L/min) Nasal Cannula O2 Flow Rate (L/min) O2 Device   03/11/24 0811 97.7 °F (36.5 °C) 98 48 Abnormal  -- -- 98 % -- -- -- --   03/11/24 0800 -- -- -- -- -- -- -- -- -- --   03/11/24 0758 -- -- -- -- -- 97 % -- -- -- --   03/11/24 0700 -- 99 54 Abnormal  142/66 95 95 % -- -- -- --   03/11/24 0600 -- 105 72 Abnormal  137/66 94 93 % -- -- -- --   03/11/24 0500 -- 107 Abnormal  64 Abnormal  132/63 91 93 % -- -- -- --   03/11/24 0400 97.5 °F (36.4 °C) 106 Abnormal  41 Abnormal  129/62 89 96 % -- -- -- Mid flow nasal cannula   03/11/24 0300 -- 100 52 Abnormal  136/63 91 95 % -- -- -- --   03/11/24 0234 -- -- -- -- -- 96 % -- 10 L/min -- Mid flow nasal cannula   03/11/24 0200 -- 95 41 Abnormal  -- -- 93 % -- -- -- --   03/11/24 0100 -- 100 47 Abnormal  132/75 93 95  % -- -- -- --   03/11/24 0000 97.7 °F (36.5 °C) 111 Abnormal  42 Abnormal  134/83 100 93 % 80 -- 15 L/min Mid flow nasal cannula   03/10/24 2300 -- 103 51 Abnormal  107/79 86 92 % -- -- -- --   03/10/24 2200 -- 115 Abnormal  42 Abnormal  114/69 87 96 % -- -- -- --   03/10/24 2100 -- 115 Abnormal  41 Abnormal  127/71 90 93 % -- -- -- --   03/10/24 2000 97.6 °F (36.4 °C) 111 Abnormal  38 Abnormal  117/70 88 91 % -- 9 L/min -- Mid flow nasal cannula   03/10/24 1913 -- -- -- -- -- 97 % -- 9 L/min -- Mid flow nasal cannula   03/10/24 1910 -- -- -- -- -- 95 % -- -- -- --   03/10/24 1900 -- 105 39 Abnormal  136/64 92 94 % -- -- -- --   03/10/24 1800 -- 112 Abnormal  45 Abnormal  131/62 89 93 % -- -- -- --   03/10/24 1700 98.2 °F (36.8 °C) 111 Abnormal  36 Abnormal  140/63 90 90 % -- -- -- --   03/10/24 1600 -- 112 Abnormal  42 Abnormal  -- -- 92 % -- -- -- --   03/10/24 1500 -- 114 Abnormal  38 Abnormal  116/57 80 91 % -- -- -- --   03/10/24 1404 -- 119 Abnormal  42 Abnormal  139/64 92 94 % -- -- -- --   03/10/24 1400 -- 108 Abnormal  34 Abnormal  144/63 90 95 % -- -- -- --   03/10/24 1300 -- 106 Abnormal  35 Abnormal  151/81 102 95 % -- -- -- --   03/10/24 1200 98.1 °F (36.7 °C) 100 28 Abnormal  134/64 90 95 % -- -- -- --   03/10/24 1100 -- 92 33 Abnormal  134/63 90 92 % -- -- -- --   03/10/24 1000 -- 114 Abnormal  40 Abnormal  136/97 112 93 % -- -- -- --   03/10/24 0900 -- 110 Abnormal  44 Abnormal  140/64 92 91 % -- -- -- --   03/10/24 0808 98.8 °F (37.1 °C) 117 Abnormal  53 Abnormal  117/68 86 99 % -- 15 L/min -- Mid flow nasal cannula   03/10/24 0700 -- 129 Abnormal  49 Abnormal  124/74 93 90 % -- -- -- --   03/10/24 0425 -- -- -- -- -- 90 % -- 14 L/min -- Mid flow nasal cannula   03/10/24 0100 -- 109 Abnormal  45 Abnormal  112/65 83 96 % -- -- -- --   03/10/24 0000 98 °F (36.7 °C) 117 Abnormal  51 Abnormal  117/67 87 93 % -- -- -- --   03/09/24 2300 -- 123 Abnormal  45 Abnormal  119/68 89 92 % -- -- -- --   03/09/24  2200 -- 120 Abnormal  48 Abnormal  114/64 84 92 % -- -- -- --   03/09/24 2100 -- 126 Abnormal  45 Abnormal  107/65 80 93 % -- -- -- --   03/09/24 2000 98 °F (36.7 °C) 124 Abnormal  50 Abnormal  108/65 81 92 % -- 13 L/min -- Mid flow nasal cannula   03/09/24 1909 -- -- -- -- -- 91 % -- 13 L/min -- Mid flow nasal cannula   03/09/24 1906 -- -- -- -- -- 91 % -- -- -- --   03/09/24 1900 -- 124 Abnormal  48 Abnormal  103/61 78 90 % -- -- -- --   03/09/24 1800 -- 116 Abnormal  33 Abnormal  122/72 91 92 % -- -- -- --   03/09/24 1700 -- 111 Abnormal  31 Abnormal  134/74 95 92 % -- -- -- --   03/09/24 1600 98.1 °F (36.7 °C) 109 Abnormal  31 Abnormal  111/71 86 95 % -- 13 L/min -- Mid flow nasal cannula   03/09/24 1500 -- 112 Abnormal  33 Abnormal  102/68 81 95 % -- -- -- --   03/09/24 1400 -- 135 Abnormal  33 Abnormal  112/66 80 97 % -- -- -- --   03/09/24 1335 -- -- -- -- -- 97 % -- 13 L/min -- Mid flow nasal cannula   03/09/24 1316 -- -- -- -- -- 94 % -- 10 L/min -- Mid flow nasal cannula   03/09/24 1300 -- 127 Abnormal  33 Abnormal  107/68 82 96 % -- -- -- --   03/09/24 1200 -- 135 Abnormal  39 Abnormal  121/66 89 93 % -- -- -- --   03/09/24 1100 -- 122 Abnormal  32 Abnormal  132/70 95 95 % -- -- -- --   03/09/24 1000 -- 124 Abnormal  37 Abnormal  120/65 84 94 % -- -- -- --   03/09/24 0900 97.5 °F (36.4 °C) 139 Abnormal  44 Abnormal  129/76 96 90 % -- -- -- --   03/09/24 0800 -- 128 Abnormal  39 Abnormal  231/140 Abnormal  171 96 % -- -- -- --   03/09/24 0737 -- -- -- -- -- -- -- 10 L/min -- Mid flow nasal cannula   03/09/24 0700 -- 122 Abnormal  48 Abnormal  159/80 115 90 % -- -- -- --   03/09/24 0600 -- 116 Abnormal  28 Abnormal  154/76 106 91 % -- -- -- --   03/09/24 0500 98.5 °F (36.9 °C) 107 Abnormal  31 Abnormal  124/68 89 92 % -- -- -- --   03/09/24 0400 -- 121 Abnormal  33 Abnormal  125/76 95 93 % -- -- -- --   03/09/24 0300 -- 117 Abnormal  32 Abnormal  137/83 105 91 % -- -- -- --   03/09/24 0200 -- 119 Abnormal   29 Abnormal  133/78 101 92 % -- -- -- --   03/09/24 0151 -- -- -- -- -- 95 % -- 8 L/min -- Mid flow nasal cannula   03/09/24 0100 -- 110 Abnormal  24 Abnormal  137/78 102 94 % -- -- -- --   03/09/24 0000 98.1 °F (36.7 °C) 114 Abnormal  24 Abnormal  125/68 92 95 % -- -- -- --       Pertinent Labs/Diagnostic Results:   Results from last 7 days   Lab Units 03/11/24 0417   SARS-COV-2  Positive*     Results from last 7 days   Lab Units 03/11/24  0415 03/10/24  0513 03/09/24  0554 03/08/24  0541 03/07/24  1829 03/07/24  1328   WBC Thousand/uL 12.13* 13.35* 21.99*  21.99* 18.10*  --   --    HEMOGLOBIN g/dL 7.2* 8.6* 8.7*  8.7* 8.5* 8.1* 8.6*   HEMATOCRIT % 23.3* 26.9* 27.8*  27.8* 26.7*  --  26.8*   PLATELETS Thousands/uL 283 400* 419*  419* 434*  --   --    BANDS PCT %  --   --  2  --   --   --          Results from last 7 days   Lab Units 03/11/24 0415 03/11/24  0028 03/10/24  1806 03/10/24  0513 03/09/24  2225 03/09/24  1358 03/09/24  0554 03/08/24  1427 03/08/24  0541 03/07/24  1641 03/07/24  0435 03/06/24  1419   SODIUM mmol/L 147 149* 147 148* 146   < > 145 144 143   < > 150*  --    POTASSIUM mmol/L 4.1 4.3 2.8* 4.0 4.2   < > 3.8 3.4* 2.8*   < > 2.7*  --    CHLORIDE mmol/L 115* 116* 114* 112* 113*   < > 110* 107 106   < > 112*  --    CO2 mmol/L 21 22 20* 24 18*   < > 24 27 24   < > 27  --    ANION GAP mmol/L 11 11 13 12 15   < > 11 10 13   < > 11  --    BUN mg/dL 39* 37* 39* 38* 34*   < > 29* 29* 31*   < > 34*  --    CREATININE mg/dL 0.69 0.65 0.64 0.59* 0.57*   < > 0.52* 0.48* 0.56*   < > 0.48*  --    EGFR ml/min/1.73sq m 96 98 98 101 102   < > 105 108 103   < > 108  --    CALCIUM mg/dL 8.3* 8.3* 8.0* 8.7 8.2*   < > 8.4 8.3* 8.3*   < > 8.6  --    CALCIUM, IONIZED mmol/L  --   --   --   --   --   --   --   --   --   --   --  1.29   MAGNESIUM mg/dL 2.2  --   --  2.2  --   --  2.2 2.1 2.0  --  2.2  --    PHOSPHORUS mg/dL  --   --   --   --   --   --   --   --   --   --  2.5*  --     < > = values in this interval  not displayed.     Results from last 7 days   Lab Units 03/10/24  0513   AST U/L 18   ALT U/L 31   ALK PHOS U/L 90   TOTAL PROTEIN g/dL 5.5*   ALBUMIN g/dL 2.9*   TOTAL BILIRUBIN mg/dL 0.59   BILIRUBIN DIRECT mg/dL 0.22*     Results from last 7 days   Lab Units 03/11/24  1004 03/11/24  0813 03/11/24  0557 03/11/24  0411 03/11/24  0210 03/11/24  0021 03/10/24  2219 03/10/24  1951 03/10/24  1806 03/10/24  1602 03/10/24  1140 03/10/24  0951   POC GLUCOSE mg/dl 109 140 176* 234* 136 134 142* 193* 204* 298* 86 149*     Results from last 7 days   Lab Units 03/11/24  0415 03/11/24  0028 03/10/24  1806 03/10/24  0513 03/09/24  2225 03/09/24  1358 03/09/24  0554 03/08/24  1427 03/08/24  0541 03/07/24  1641 03/07/24  0435 03/06/24  0441   GLUCOSE RANDOM mg/dL 238* 116 215* 176* 187* 143* 151* 80 191* 329* 141* 206*       Results from last 7 days   Lab Units 03/11/24  0749 03/10/24  0803 03/06/24  1227   PH ART  7.471* 7.470* 7.483*   PCO2 ART mm Hg 28.2* 29.8* 33.4*   PO2 ART mm Hg 63.3* 60.7* 46.4*   HCO3 ART mmol/L 20.1* 21.2* 24.5   BASE EXC ART mmol/L -2.9 -2.0 1.3   O2 CONTENT ART mL/dL 10.6* 10.7* 11.7*   O2 HGB, ARTERIAL % 91.6* 90.9* 86.3*   ABG SOURCE  Radial, Right Radial, Right Radial, Right   NON VENT TYPE HFNC   --   --  HFNC Flow   HFNC FLOW LPM   --   --  40     Results from last 7 days   Lab Units 03/10/24  0534   PH NICA  7.391   PCO2 NICA mm Hg 39.2*   PO2 NICA mm Hg 40.3   HCO3 NICA mmol/L 23.3*   BASE EXC NICA mmol/L -1.5   O2 CONTENT NICA ml/dL 9.9   O2 HGB, VENOUS % 73.3             Results from last 7 days   Lab Units 03/09/24  1358   HS TNI 0HR ng/L 60*                 Results from last 7 days   Lab Units 03/10/24  0513   PROCALCITONIN ng/ml 1.08*     Results from last 7 days   Lab Units 03/10/24  1618 03/10/24  0851 03/10/24  0513   LACTIC ACID mmol/L 1.4 3.3* 3.5*           Results from last 7 days   Lab Units 03/08/24  0801 03/07/24  0555   UNIT PRODUCT CODE  [id number]  [id number]   UNIT NUMBER   [id number]  [id number]   UNITABO  A  A A   UNITRH  NEG  NEG NEG   CROSSMATCH  Compatible  Compatible Compatible   UNIT DISPENSE STATUS  Return to Inv  Return to Inv Presumed Trans   UNIT PRODUCT VOL mL 350  350 350         Results from last 7 days   Lab Units 03/11/24  0415 03/10/24  0513 03/09/24  0554 03/08/24  0541 03/07/24  0435   CRP mg/L 166.1* 112.1* 14.8* 36.9* 109.6*     Results from last 7 days   Lab Units 03/04/24  1809   CLARITY UA  Clear   COLOR UA  Colorless   SPEC GRAV UA  1.028   PH UA  8.0   GLUCOSE UA mg/dl Negative   KETONES UA mg/dl Negative   BLOOD UA  Negative   PROTEIN UA mg/dl Negative   NITRITE UA  Negative   BILIRUBIN UA  Negative   UROBILINOGEN UA (BE) mg/dl <2.0   LEUKOCYTES UA  Negative   WBC UA /hpf 4-10*   RBC UA /hpf 1-2   BACTERIA UA /hpf None Seen   EPITHELIAL CELLS WET PREP /hpf None Seen           Results from last 7 days   Lab Units 03/11/24  0557 03/06/24  0441 03/05/24  0458   VANCOMYCIN RM ug/mL 26.7* 29.1* 15.7       Medications:   Scheduled Medications:  cefepime, 2,000 mg, Intravenous, Q8H  chlorhexidine, 15 mL, Mouth/Throat, Q12H SARTHAK  enoxaparin, 40 mg, Subcutaneous, Q24H SARTHAK  famotidine, 20 mg, Per NG Tube, BID  furosemide, 40 mg, Intravenous, Once  ipratropium, 0.5 mg, Nebulization, TID  lamoTRIgine, 150 mg, Oral, BID  levalbuterol, 1.25 mg, Nebulization, TID  methylPREDNISolone sodium succinate, 250 mg, Intravenous, Once  methylPREDNISolone sodium succinate, 60 mg, Intravenous, Q8H SARTHAK  metroNIDAZOLE, 500 mg, Oral, Q8H SARTHAK  nirmatrelvir & ritonavir, 3 tablet, Oral, BID  nystatin, , Topical, BID  polyethylene glycol, 17 g, Per NG Tube, Daily  remdesivir, 100 mg, Intravenous, Q24H  sodium chloride, 2 spray, Each Nare, Q4H  topiramate, 100 mg, Per PEG Tube, BID  vancomycin, 500 mg, Intravenous, Q12H  venlafaxine, 25 mg, Oral, Daily      Continuous IV Infusions:  dexmedetomidine, 0.1-0.7 mcg/kg/hr, Intravenous, Titrated  insulin regular  (HumuLIN R,NovoLIN R) 1 Units/mL in sodium chloride 0.9 % 100 mL infusion, 0.3-21 Units/hr, Intravenous, Titrated      PRN Meds:  acetaminophen, 650 mg, Oral, Q6H PRN  hydrALAZINE, 10 mg, Intravenous, Q6H PRN  OLANZapine, 10 mg, Oral, HS PRN  ondansetron, 4 mg, Intravenous, Q6H PRN  oxyCODONE, 2.5 mg, Oral, Q4H PRN   Or  oxyCODONE, 5 mg, Oral, Q4H PRN  sodium chloride, 1 Application, Nasal, Q1H PRN        Discharge Plan: TBD    Network Utilization Review Department  ATTENTION: Please call with any questions or concerns to [phone] and carefully listen to the prompts so that you are directed to the right person. All voicemails are confidential.   For Discharge needs, contact Care Management DC Support Team at [phone] opt. [phone]  Send all requests for admission clinical reviews, approved or denied determinations and any other requests to dedicated fax number below belonging to the [facility] where the patient is receiving treatment. List of dedicated fax numbers for the Facilities:  FACILITY NAME UR FAX NUMBER   ADMISSION DENIALS (Administrative/Medical Necessity) [phone]   DISCHARGE SUPPORT TEAM (NETWORK) [phone]   PARENT CHILD HEALTH (Maternity/NICU/Pediatrics) [phone]   [facility] [phone]   [facility] [phone]   [facility] [phone]   [facility] [phone]   [facility] [phone]   [facility] [phone]   [facility] [phone]   [facility] [phone]   [facility] [phone]   [facility] [phone]   General acute hospital [phone]   [facility] [phone]

## 2024-03-11 NOTE — CHAPLAIN
Referred by Resident ,  On-Call visited pt and family- Daughter Cathryn and  Mitchell.    Health care workers were also present.    Established a relationship of care and support, provided silent and supportive presence and bedside prayer.    Family was visibly distressed, quiet.     invited them to contact Spiritual Care any time for additional support.

## 2024-03-11 NOTE — SPEECH THERAPY NOTE
Speech/Language Pathology Progress Note    Patient Name: Carla Hunt  Today's Date: 3/11/2024     Pt required intubation. ST orders completed at this time. Please re consult when able/appropriate.

## 2024-03-11 NOTE — PROGRESS NOTES
Progress Note - Infectious Disease   Carla Hunt 59 y.o. female MRN: [id number]  [id number]      Impression/Recommendations:  1. Acute hypoxic respiratory failure, likely multifactorial, with both COVID and bacterial pneumonia contributing.  Also consider persistent inflammation, fibrosis as seems quite steroid responsive in past.  Most recently extubated 3/1.  Worsening respiratory status over weekend, now reintubated.  Consider progressive bacterial infection given increased secretions, although has been on antibiotics and has no fever, leukocytosis, or focal infiltrates on recent CT.  Consider recrudescence of inflammation in setting of steroid taper as this has been observed previously and CRP now rising.  Still has low-level COVID shedding although suspect this is not primarily driving worsening hypoxia.  -Continue antivirals for now  -Continue current antibiotics  -Trial of increased steroids  -Follow up bronch cultures  -Trend CRP  -If clinically deteriorates with concern for progressive sepsis could change cefepime/flagyl to meropenem 1g IV Q8    2. SIRS versus sepsis.  Fluctuating fever, WBC.  Fevers may be multifactorial due to COVID (still positive antigen and PCR) versus inflammation (seem to correlate to steroid dosing).  Also consider developing bacterial infection.  Recent blood cultures 2/26 negative.  CT C/A/P 3/10 shows stable GG opacities, inflammation around stoma.  -Continue current antibiotics, anitvirals for now  -Follow temperatures closely  -Recheck WBC in AM to monitor infection  -Supportive care as per the primary service     3. Recurrent bacterial pneumonia.  The patient has completed multiple treatment courses over the hospitalization.  Most recent BAL culture with Pseudomonas and stenotrophomonas.  Unclear if pathogens or colonizers.  Status post 10 days levofloxacin.  CT C/A/P showed evolving changes likely all due to sequelae of COVID, infections.   Noted to have worsening hypoxia and secretions but has been on broad-spectrum antibiotics              -continue vancomycin, cefepime, flagyl for now   -follow up repeat BAL but will almost certainly have resistance given extensive antibiotic exposures              -Wean O2 as able     4. Severe COVID, present on admission.  Patient was treated with a 10-day course of remdesivir, dexamethasone and was given 1 dose of Tocilizumab on admission.  COVID antigen was negative prior to coming off isolation.  Had positive COVID PCR from BAL 2/16, status post another 5-day course of remdesivir.  Patient has remained on high-dose systemic corticosteroid throughout her hospitalization which were recently intensified 2/26 for fevers.  Patient having persistent fevers, hypoxia.  COVID antigen positive and PCR is also positive 3/3 and Ct 26.8, consistent with high viral replication.  Repeat PCR positive with Ct now closer to 30.  -Continue remdesivir/Paxlovid for now  -Increase steroids as above     5. 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  Patient is status post exploratory laparotomy with ileocecectomy and end ileostomy creation 2/20.  CT now concerning for cellulitic changes around the ostomy.  End ileostomy is with ongoing ischemic changes which is likely contributing to the imaging findings and ongoing SIRS response  -Serial exams  -Surgery follow-up  -Antibiotics as above     B-cell lymphoma, on maintenance rituxan, which is currently postponed.  Rituximab is a risk factor for persistent COVID infection.     I discussed with Dr. Martins the above plan to trial increased steroid dose prior to any changes in antibiotics.  He agrees with the plan.     Antibiotics:  Day 10 Paxlovid/remdesivir  Day 8 vancomycin/Cefepime  Day 4 Flagyl    Subjective/24 Hour Events:  Patient seen on AM rounds.  Progressive hypoxia over the weekend, just reintubated.  Copious secretions noted upon intubation, bronch was done  revealing extensive mucoid verus purulence secretions, sent for culture.  Got increased dose IV steroids this AM.    Objective:  Vitals:  Temp:  [97.5 °F (36.4 °C)-98.2 °F (36.8 °C)] 97.7 °F (36.5 °C)  HR:  [] 98  Resp:  [34-72] 48  BP: (107-233)/() 233/104  SpO2:  [90 %-98 %] 98 %  Temp (24hrs), Av.7 °F (36.5 °C), Min:97.5 °F (36.4 °C), Max:98.2 °F (36.8 °C)  Current: Temperature: 97.7 °F (36.5 °C)    Physical Exam:   General:  No acute distress  Psychiatric:  Awake and alert  Pulmonary:  On ventilator without accessory muscle use  Abdomen:  Soft, nontender  Extremities:  No edema  Skin:  No rashes    Lab Results:  I have personally reviewed pertinent labs.  Results from last 7 days   Lab Units 245 24  0028 03/10/24  1806 03/10/24  0513   SODIUM mmol/L 147 149* 147 148*   POTASSIUM mmol/L 4.1 4.3 2.8* 4.0   CHLORIDE mmol/L 115* 116* 114* 112*   CO2 mmol/L 21 22 20* 24   BUN mg/dL 39* 37* 39* 38*   CREATININE mg/dL 0.69 0.65 0.64 0.59*   EGFR ml/min/1.73sq m 96 98 98 101   CALCIUM mg/dL 8.3* 8.3* 8.0* 8.7   AST U/L  --   --   --  18   ALT U/L  --   --   --  31   ALK PHOS U/L  --   --   --  90     Results from last 7 days   Lab Units 24  0415 03/10/24  0513 24  0554   WBC Thousand/uL 12.13* 13.35* 21.99*  21.99*   HEMOGLOBIN g/dL 7.2* 8.6* 8.7*  8.7*   PLATELETS Thousands/uL 283 400* 419*  419*     Results from last 7 days   Lab Units 24  1113   MRSA CULTURE ONLY  No Methicillin Resistant Staphlyococcus aureus (MRSA) isolated       Imaging Studies:   I have personally reviewed pertinent imaging study reports and images in PACS.  CT chest images reviewed personally diffuse GG opacities with more focal distal nodular opacities    EKG, Pathology, and Other Studies:   I have personally reviewed pertinent reports.

## 2024-03-11 NOTE — PROGRESS NOTES
Vancomycin IV Pharmacy-to-Dose Consultation    Carla Hunt is a 59 y.o. female who is currently receiving Vancomycin IV with management by the Pharmacy Consult service.    Relevant clinical data and objective / subjective history reviewed.      Vancomycin Assessment:  Indication: Other Ostomy infection  Status: critically ill  Micro:   - 3/11 COVID: (+)  Procalcitonin: 1.08 on 3/10  Renal Function:     Lab Results   Component Value Date    CREATININE 0.69 03/11/2024     Estimated Creatinine Clearance: 89.7 mL/min (by C-G formula based on SCr of 0.69 mg/dL).  Dialysis: no  Days of Therapy: 8 (1 day gap between discontinuation of previous regimen and initiation of current regimen.)  Current Dose: 750 mg IV q12h   Goal AUC / Trough: -600, trough >10   Last Level: 26.7  Model Fit:  intermediate  Assessment: ID following. WBC elevated, afebrile.       Vancomycin Plan:  New Dosing: change to 500 mg IV q12h   Predicted Trough / AUC: 14.6 / 460  Next Level: on 3/12 at 0600  Renal Function Monitoring: Daily BMP and UOP      Pharmacy will continue to follow closely for s/sx of nephrotoxicity, infusion reactions and appropriateness of therapy.  BMP and CBC will be ordered per protocol. We will continue to follow the patient’s culture results and clinical progress daily.       Gabe Henriquez, PharmD, BCCCP  Critical Care Clinical Pharmacist  Available via [employer]

## 2024-03-11 NOTE — CASE MANAGEMENT
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    COVID 2024    Stage 3b chronic kidney disease (CKD) ([facility]) 2024    Abnormal CT of the head 2024    Nephrolithiasis 2021    Other specified anxiety disorders 2019    Reactive depression 2019    Hidradenitis suppurativa of left axilla 2019    Anxiety state 2018    Disorder of parathyroid gland ([facility]) 2018    Eczema 2018    Grand mal status ([facility]) 2018    Hypercalcemia 2018    Hypertensive disorder 2018    Open wound of hand except fingers 2018    Otitis externa 2018    Overweight 2018    Pain in face 2018    Skin sensation disturbance 2018    Subjective visual disturbance 2018    Long term current use of hormonal contraceptive 10/23/2018    Rosacea 2015      LOS (days): 61  Geometric Mean LOS (GMLOS) (days):   Days to GMLOS:     OBJECTIVE:  Risk of Unplanned Readmission Score: 31.12         Current admission status: Inpatient   Preferred Pharmacy:   [id number] [place] - [address]  [facility] [place] [address]  Phone: [phone] Fax: [phone]    [facility] - [place], [place] - [address]  [address] [facility] [address]  Phone: [phone] Fax:  [phone]    Primary Care Provider: Tony Guevara MD    Primary Insurance: MEDICARE  Secondary Insurance: [employer]    DISCHARGE DETAILS:      Additional Comments: Patient continues to be intubated with Palliative Care involvement.  CM to be available as needed for DC needs.

## 2024-03-11 NOTE — PLAN OF CARE
Problem: Prexisting or High Potential for Compromised Skin Integrity  Goal: Skin integrity is maintained or improved  Description: INTERVENTIONS:  - Identify patients at risk for skin breakdown  - Assess and monitor skin integrity  - Assess and monitor nutrition and hydration status  - Monitor labs   - Assess for incontinence   - Turn and reposition patient  - Assist with mobility/ambulation  - Relieve pressure over bony prominences  - Avoid friction and shearing  - Provide appropriate hygiene as needed including keeping skin clean and dry  - Evaluate need for skin moisturizer/barrier cream  - Collaborate with interdisciplinary team   - Patient/family teaching  - Consider wound care consult   Outcome: Progressing     Problem: Nutrition/Hydration-ADULT  Goal: Nutrient/Hydration intake appropriate for improving, restoring or maintaining nutritional needs  Description: Monitor and assess patient's nutrition/hydration status for malnutrition. Collaborate with interdisciplinary team and initiate plan and interventions as ordered.  Monitor patient's weight and dietary intake as ordered or per policy. Utilize nutrition screening tool and intervene as necessary. Determine patient's food preferences and provide high-protein, high-caloric foods as appropriate.     INTERVENTIONS:  - Monitor oral intake, urinary output, labs, and treatment plans  - Assess nutrition and hydration status and recommend course of action  - Evaluate amount of meals eaten  - Assist patient with eating if necessary   - Allow adequate time for meals  - Recommend/ encourage appropriate diets, oral nutritional supplements, and vitamin/mineral supplements  - Order, calculate, and assess calorie counts as needed  - Recommend, monitor, and adjust tube feedings and TPN/PPN based on assessed needs  - Assess need for intravenous fluids  - Provide specific nutrition/hydration education as appropriate  - Include patient/family/caregiver in decisions related to  nutrition  Outcome: Progressing     Problem: SAFETY,RESTRAINT: NV/NON-SELF DESTRUCTIVE BEHAVIOR  Goal: Remains free of harm/injury (restraint for non violent/non self-detsructive behavior)  Description: INTERVENTIONS:  - Instruct patient/family regarding restraint use   - Assess and monitor physiologic and psychological status   - Provide interventions and comfort measures to meet assessed patient needs   - Identify and implement measures to help patient regain control  - Assess readiness for release of restraint   Outcome: Progressing  Goal: Returns to optimal restraint-free functioning  Description: INTERVENTIONS:  - Assess the patient's behavior and symptoms that indicate continued need for restraint  - Identify and implement measures to help patient regain control  - Assess readiness for release of restraint   Outcome: Progressing     Problem: INFECTION - ADULT  Goal: Absence or prevention of progression during hospitalization  Description: INTERVENTIONS:  - Assess and monitor for signs and symptoms of infection  - Monitor lab/diagnostic results  - Monitor all insertion sites, i.e. indwelling lines, tubes, and drains  - Monitor endotracheal if appropriate and nasal secretions for changes in amount and color  - [place] appropriate cooling/warming therapies per order  - Administer medications as ordered  - Instruct and encourage patient and family to use good hand hygiene technique  - Identify and instruct in appropriate isolation precautions for identified infection/condition  Outcome: Progressing     Problem: SAFETY ADULT  Goal: Patient will remain free of falls  Description: INTERVENTIONS:  - Educate patient/family on patient safety including physical limitations  - Instruct patient to call for assistance with activity   - Consult OT/PT to assist with strengthening/mobility   - Keep Call bell within reach  - Keep bed low and locked with side rails adjusted as appropriate  - Keep care items and personal  belongings within reach  - Initiate and maintain comfort rounds  - Make Fall Risk Sign visible to staff  - Offer Toileting every 2 Hours, in advance of need  - Initiate/Maintain bed alarm  - Obtain necessary fall risk management equipment: non skid footwear  - Apply yellow socks and bracelet for high fall risk patients  - Consider moving patient to room near nurses station  Outcome: Progressing     Problem: RESPIRATORY - ADULT  Goal: Achieves optimal ventilation and oxygenation  Description: INTERVENTIONS:  - Assess for changes in respiratory status  - Assess for changes in mentation and behavior  - Position to facilitate oxygenation and minimize respiratory effort  - Oxygen administered by appropriate delivery if ordered  - Initiate smoking cessation education as indicated  - Encourage broncho-pulmonary hygiene including cough, deep breathe, Incentive Spirometry  - Assess the need for suctioning and aspirate as needed  - Assess and instruct to report SOB or any respiratory difficulty  - Respiratory Therapy support as indicated  Outcome: Progressing     Problem: SKIN/TISSUE INTEGRITY - ADULT  Goal: Skin Integrity remains intact(Skin Breakdown Prevention)  Description: Assess:  -Perform Flavio assessment every shift   -Clean and moisturize skin every day   -Inspect skin when repositioning, toileting, and assisting with ADLS  -Assess under medical devices such as ronny  every hour   -Assess extremities for adequate circulation and sensation     Bed Management:  -Have minimal linens on bed & keep smooth, unwrinkled  -Change linens as needed when moist or perspiring  -Avoid sitting or lying in one position for more than 2 hours while in bed  -Keep HOB at 45 degrees     Toileting:  -Offer bedside commode  -Assess for incontinence every hour  -Use incontinent care products after each incontinent episode such as moisture barrier cream     Activity:  -Mobilize patient 3 times a day  -Encourage activity and walks on  unit  -Encourage or provide ROM exercises   -Turn and reposition patient every 2 Hours  -Use appropriate equipment to lift or move patient in bed  -Instruct/ Assist with weight shifting every hour when out of bed in chair  -Consider limitation of chair time 2 hour intervals    Skin Care:  -Avoid use of baby powder, tape, friction and shearing, hot water or constrictive clothing  -Relieve pressure over bony prominences using allevyn   -Do not massage red bony areas    Next Steps:  -Teach patient strategies to minimize risks such as weight shifting    -Consider consults to  interdisciplinary teams such as PT  Outcome: Progressing  Goal: Incision(s), wounds(s) or drain site(s) healing without S/S of infection  Description: INTERVENTIONS  - Assess and document dressing, incision, wound bed, drain sites and surrounding tissue  - Provide patient and family education  - Perform skin care/dressing changes every 12 hr  Outcome: Progressing  Goal: Pressure injury heals and does not worsen  Description: Interventions:  - Implement low air loss mattress or specialty surface (Criteria met)  - Apply silicone foam dressing  - Instruct/assist with weight shifting every 120 minutes when in chair   - Limit chair time to 6 hour intervals  - Use special pressure reducing interventions such as wedges when in chair   - Apply fecal or urinary incontinence containment device   - Perform passive or active ROM every 4 hr  - Turn and reposition patient & offload bony prominences every 2 hours   - Utilize friction reducing device or surface for transfers   - Consider consults to  interdisciplinary teams such as pt/ot  - Use incontinent care products after each incontinent episode such as incontinence cleanser  - Consider nutrition services referral as needed  Outcome: Progressing     Problem: NEUROSENSORY - ADULT  Goal: Achieves stable or improved neurological status  Description: INTERVENTIONS  - Monitor and report changes in neurological  status  - Monitor vital signs such as temperature, blood pressure, glucose, and any other labs ordered   - Initiate measures to prevent increased intracranial pressure  - Monitor for seizure activity and implement precautions if appropriate      Outcome: Progressing  Goal: Achieves maximal functionality and self care  Description: INTERVENTIONS  - Monitor swallowing and airway patency with patient fatigue and changes in neurological status  - Encourage and assist patient to increase activity and self care.   - Encourage visually impaired, hearing impaired and aphasic patients to use assistive/communication devices  Outcome: Progressing     Problem: CARDIOVASCULAR - ADULT  Goal: Maintains optimal cardiac output and hemodynamic stability  Description: INTERVENTIONS:  - Monitor I/O, vital signs and rhythm  - Monitor for S/S and trends of decreased cardiac output  - Administer and titrate ordered vasoactive medications to optimize hemodynamic stability  - Assess quality of pulses, skin color and temperature  - Assess for signs of decreased coronary artery perfusion  - Instruct patient to report change in severity of symptoms  Outcome: Progressing  Goal: Absence of cardiac dysrhythmias or at baseline rhythm  Description: INTERVENTIONS:  - Continuous cardiac monitoring, vital signs, obtain 12 lead EKG if ordered  - Administer antiarrhythmic and heart rate control medications as ordered  - Monitor electrolytes and administer replacement therapy as ordered  Outcome: Progressing     Problem: GASTROINTESTINAL - ADULT  Goal: Minimal or absence of nausea and/or vomiting  Description: INTERVENTIONS:  - Administer IV fluids if ordered to ensure adequate hydration  - Maintain NPO status until nausea and vomiting are resolved  - Nasogastric tube if ordered  - Administer ordered antiemetic medications as needed  - Provide nonpharmacologic comfort measures as appropriate  - Advance diet as tolerated, if ordered  - Consider nutrition  services referral to assist patient with adequate nutrition and appropriate food choices  Outcome: Progressing  Goal: Maintains or returns to baseline bowel function  Description: INTERVENTIONS:  - Assess bowel function  - Encourage oral fluids to ensure adequate hydration  - Administer IV fluids if ordered to ensure adequate hydration  - Administer ordered medications as needed  - Encourage mobilization and activity  - Consider nutritional services referral to assist patient with adequate nutrition and appropriate food choices  Outcome: Progressing  Goal: Maintains adequate nutritional intake  Description: INTERVENTIONS:  - Monitor percentage of each meal consumed  - Identify factors contributing to decreased intake, treat as appropriate  - Assist with meals as needed  - Monitor I&O, weight, and lab values if indicated  - Obtain nutrition services referral as needed  Outcome: Progressing  Goal: Establish and maintain optimal ostomy function  Description: INTERVENTIONS:  - Assess bowel function  - Encourage oral fluids to ensure adequate hydration  - Administer IV fluids if ordered to ensure adequate hydration   - Administer ordered medications as needed  - Encourage mobilization and activity  - Nutrition services referral to assist patient with appropriate food choices  - Assess stoma site  - Consider wound care consult   Outcome: Progressing  Goal: Oral mucous membranes remain intact  Description: INTERVENTIONS  - Assess oral mucosa and hygiene practices  - Implement preventative oral hygiene regimen  - Implement oral medicated treatments as ordered  - Initiate Nutrition services referral as needed  Outcome: Progressing     Problem: GENITOURINARY - ADULT  Goal: Maintains or returns to baseline urinary function  Description: INTERVENTIONS:  - Assess urinary function  - Encourage oral fluids to ensure adequate hydration if ordered  - Administer IV fluids as ordered to ensure adequate hydration  - Administer ordered  medications as needed  - Offer frequent toileting  - Follow urinary retention protocol if ordered  Outcome: Progressing  Goal: Urinary catheter remains patent  Description: INTERVENTIONS:  - Assess patency of urinary catheter  - If patient has a chronic marie, consider changing catheter if non-functioning  - Follow guidelines for intermittent irrigation of non-functioning urinary catheter  Outcome: Progressing     Problem: METABOLIC, FLUID AND ELECTROLYTES - ADULT  Goal: Electrolytes maintained within normal limits  Description: INTERVENTIONS:  - Monitor labs and assess patient for signs and symptoms of electrolyte imbalances  - Administer electrolyte replacement as ordered  - Monitor response to electrolyte replacements, including repeat lab results as appropriate  - Instruct patient on fluid and nutrition as appropriate  Outcome: Progressing  Goal: Fluid balance maintained  Description: INTERVENTIONS:  - Monitor labs   - Monitor I/O and WT  - Instruct patient on fluid and nutrition as appropriate  - Assess for signs & symptoms of volume excess or deficit  Outcome: Progressing  Goal: Glucose maintained within target range  Description: INTERVENTIONS:  - Monitor Blood Glucose as ordered  - Assess for signs and symptoms of hyperglycemia and hypoglycemia  - Administer ordered medications to maintain glucose within target range  - Assess nutritional intake and initiate nutrition service referral as needed  Outcome: Progressing     Problem: HEMATOLOGIC - ADULT  Goal: Maintains hematologic stability  Description: INTERVENTIONS  - Assess for signs and symptoms of bleeding or hemorrhage  - Monitor labs  - Administer supportive blood products/factors as ordered and appropriate  Outcome: Progressing     Problem: MUSCULOSKELETAL - ADULT  Goal: Maintain or return mobility to safest level of function  Description: INTERVENTIONS:  - Assess patient's ability to carry out ADLs; assess patient's baseline for ADL function and  identify physical deficits which impact ability to perform ADLs (bathing, care of mouth/teeth, toileting, grooming, dressing, etc.)  - Assess/evaluate cause of self-care deficits   - Assess range of motion  - Assess patient's mobility  - Assess patient's need for assistive devices and provide as appropriate  - Encourage maximum independence but intervene and supervise when necessary  - Involve family in performance of ADLs  - Assess for home care needs following discharge   - Consider OT consult to assist with ADL evaluation and planning for discharge  - Provide patient education as appropriate  Outcome: Progressing     Problem: COPING  Goal: Pt/Family able to verbalize concerns and demonstrate effective coping strategies  Description: INTERVENTIONS:  - Assist patient/family to identify coping skills, available support systems and cultural and spiritual values  - Provide emotional support, including active listening and acknowledgement of concerns of patient and caregivers  - Reduce environmental stimuli, as able  - Provide patient education  - Assess for spiritual pain/suffering and initiate spiritual care, including notification of Pastoral Care or natalia based community as needed  - Assess effectiveness of coping strategies  Outcome: Progressing  Goal: Will report anxiety at manageable levels  Description: INTERVENTIONS:  - Administer medication as ordered  - Teach and encourage coping skills  - Provide emotional support  - Assess patient/family for anxiety and ability to cope  Outcome: Progressing     Problem: BEHAVIOR  Goal: Pt/Family maintain appropriate behavior and adhere to behavioral management agreement, if implemented  Description: INTERVENTIONS:  - Assess the family dynamic   - Encourage verbalization of thoughts and concerns in a socially appropriate manner  - Assess patient/family's coping skills and non-compliant behavior (including use of illegal substances).  - Utilize positive, consistent limit  setting strategies supporting safety of patient, staff and others  - Initiate consult with Case Management, Spiritual Care or other ancillary services as appropriate  - If a patient's/visitor's behavior jeopardizes the safety of the patient, staff, or others, refer to organization procedure.   - Notify Security of behavior or suspected illegal substances which indicate the need for search of the patient and/or belongings  - Encourage participation in the decision making process about a behavioral management agreement; implement if patient meets criteria  Outcome: Progressing     Problem: Potential for Falls  Goal: Patient will remain free of falls  Description: INTERVENTIONS:  - Educate patient/family on patient safety including physical limitations  - Instruct patient to call for assistance with activity   - Consult OT/PT to assist with strengthening/mobility   - Keep Call bell within reach  - Keep bed low and locked with side rails adjusted as appropriate  - Keep care items and personal belongings within reach  - Initiate and maintain comfort rounds  - Make Fall Risk Sign visible to staff  - Offer Toileting every 2 Hours, in advance of need  - Initiate/Maintain bed alarm  - Obtain necessary fall risk management equipment: non skid footwear  - Apply yellow socks and bracelet for high fall risk patients  - Consider moving patient to room near nurses station  Outcome: Progressing

## 2024-03-11 NOTE — RESPIRATORY THERAPY NOTE
Resp Therapy   03/11/24 1250   Respiratory Assessment   Resp Comments Pt already on bipap, pt intubated and then bronch performed immediately after. Pt tolerated both procedures without issues/ Pt vent settings are currently set at S(cmv) rr 14 vt 400 peep 8 fio2 100%. Will continue to monitor pt per resp protocol.   Vent Information   Vent type Summers G5   Summers Vent Mode (S)CMV   Ventilator Start Yes   $ Pulse Oximetry Spot Check Charge Completed   (S)CMV Settings   Resp Rate (BPM) 14 BPM   VT (mL) 400 mL   FIO2 (%) 100 %   PEEP (cmH2O) 8 cmH2O   Insp Time (%) 1 %   Flow Trigger (LPM) 5   Humidification Heater   Heater Temperature (Set) 95 °F (35 °C)   (S)CMV Actuals   Resp Rate (BPM) 25 BPM   VT (mL) 444   MV 12   MAP (cmH2O) 14 cmH2O   Peak Pressure (cmH2O) 29 cmH2O   I:E Ratio (Obs) !:1.6   Heater Temperature (Obs) 95 °F (35 °C)   (S)CMV Alarms   High Peak Pressure (cmH2O) 40   Low Pressure (cmH2O) 5 cm H2O   High Resp Rate (BPM) 40 BPM   Low Resp Rate (BPM) 8 BPM   High MV (L/min) 20 L/min   Low MV (L/min) 4 L/min   High VT (mL) 1000 mL   Low VT (mL) 250 mL   Apnea Time (s) 20 S   Maintenance   Alarm (pink) cable attached No   Resuscitation bag with peep valve at bedside Yes   Water bag changed Yes   Circuit changed No   Daily Screen   Patient safety screen outcome: Failed   Not Ready for Weaning due to: Underline problem not resolved

## 2024-03-11 NOTE — ASSESSMENT & PLAN NOTE
Patient was extubated after prolonged intubation and weaned down to 6L O2 via NC end of last week. Now with escalating O2 requirements over weekend. Currently tachypneic on BiPAP and  discussed likelihood of re-intubation as seen in goals of care A/P  Very careful steroid management per critical care team.  attributes clinical decline to steroid wean and requests bolus steroids.  Patient remains full code. Patient's  understands if she is re-intubated will need to readdress tracheostomy.

## 2024-03-12 LAB
ABO GROUP BLD: NORMAL
ANION GAP SERPL CALCULATED.3IONS-SCNC: 11 MMOL/L
ANION GAP SERPL CALCULATED.3IONS-SCNC: 7 MMOL/L (ref 4–13)
ANISOCYTOSIS BLD QL SMEAR: PRESENT
ANISOCYTOSIS BLD QL SMEAR: PRESENT
APTT PPP: 33 SECONDS (ref 23–37)
ATRIAL RATE: 110 BPM
BASE EXCESS BLDA CALC-SCNC: -3.9 MMOL/L
BASE EXCESS BLDA CALC-SCNC: -4.2 MMOL/L
BASE EXCESS BLDA CALC-SCNC: -4.6 MMOL/L
BASOPHILS # BLD MANUAL: 0 THOUSAND/UL (ref 0–0.1)
BASOPHILS NFR MAR MANUAL: 0 % (ref 0–1)
BLD GP AB SCN SERPL QL: NEGATIVE
BUN SERPL-MCNC: 41 MG/DL (ref 5–25)
BUN SERPL-MCNC: 44 MG/DL (ref 5–25)
BURR CELLS BLD QL SMEAR: PRESENT
BURR CELLS BLD QL SMEAR: PRESENT
CALCIUM SERPL-MCNC: 7.5 MG/DL (ref 8.4–10.2)
CALCIUM SERPL-MCNC: 7.9 MG/DL (ref 8.4–10.2)
CHLORIDE SERPL-SCNC: 111 MMOL/L (ref 96–108)
CHLORIDE SERPL-SCNC: 113 MMOL/L (ref 96–108)
CO2 SERPL-SCNC: 23 MMOL/L (ref 21–32)
CO2 SERPL-SCNC: 23 MMOL/L (ref 21–32)
CREAT SERPL-MCNC: 0.9 MG/DL (ref 0.6–1.3)
CREAT SERPL-MCNC: 0.96 MG/DL (ref 0.6–1.3)
CRP SERPL QL: 106.3 MG/L
D DIMER PPP FEU-MCNC: 0.54 UG/ML FEU
DACRYOCYTES BLD QL SMEAR: PRESENT
DACRYOCYTES BLD QL SMEAR: PRESENT
EOSINOPHIL # BLD MANUAL: 0 THOUSAND/UL (ref 0–0.4)
EOSINOPHIL NFR BLD MANUAL: 0 % (ref 0–6)
ERYTHROCYTE [DISTWIDTH] IN BLOOD BY AUTOMATED COUNT: 20.9 % (ref 11.6–15.1)
ERYTHROCYTE [DISTWIDTH] IN BLOOD BY AUTOMATED COUNT: 21.8 % (ref 11.6–15.1)
ERYTHROCYTE [DISTWIDTH] IN BLOOD BY AUTOMATED COUNT: 21.9 % (ref 11.6–15.1)
FIBRINOGEN PPP-MCNC: 598 MG/DL (ref 207–520)
GFR SERPL CREATININE-BSD FRML MDRD: 65 ML/MIN/1.73SQ M
GFR SERPL CREATININE-BSD FRML MDRD: 70 ML/MIN/1.73SQ M
GLUCOSE SERPL-MCNC: 106 MG/DL (ref 65–140)
GLUCOSE SERPL-MCNC: 109 MG/DL (ref 65–140)
GLUCOSE SERPL-MCNC: 126 MG/DL (ref 65–140)
GLUCOSE SERPL-MCNC: 128 MG/DL (ref 65–140)
GLUCOSE SERPL-MCNC: 128 MG/DL (ref 65–140)
GLUCOSE SERPL-MCNC: 141 MG/DL (ref 65–140)
GLUCOSE SERPL-MCNC: 143 MG/DL (ref 65–140)
GLUCOSE SERPL-MCNC: 143 MG/DL (ref 65–140)
GLUCOSE SERPL-MCNC: 153 MG/DL (ref 65–140)
GLUCOSE SERPL-MCNC: 156 MG/DL (ref 65–140)
GLUCOSE SERPL-MCNC: 156 MG/DL (ref 65–140)
GLUCOSE SERPL-MCNC: 172 MG/DL (ref 65–140)
GLUCOSE SERPL-MCNC: 185 MG/DL (ref 65–140)
GLUCOSE SERPL-MCNC: 191 MG/DL (ref 65–140)
GLUCOSE SERPL-MCNC: 236 MG/DL (ref 65–140)
HCO3 BLDA-SCNC: 21 MMOL/L (ref 22–28)
HCO3 BLDA-SCNC: 21.3 MMOL/L (ref 22–28)
HCO3 BLDA-SCNC: 23 MMOL/L (ref 22–28)
HCT VFR BLD AUTO: 19 % (ref 34.8–46.1)
HCT VFR BLD AUTO: 19.4 % (ref 34.8–46.1)
HCT VFR BLD AUTO: 23.6 % (ref 34.8–46.1)
HGB BLD-MCNC: 5.9 G/DL (ref 11.5–15.4)
HGB BLD-MCNC: 6 G/DL (ref 11.5–15.4)
HGB BLD-MCNC: 7.7 G/DL (ref 11.5–15.4)
INR PPP: 1.39 (ref 0.84–1.19)
LYMPHOCYTES # BLD AUTO: 0 % (ref 14–44)
LYMPHOCYTES # BLD AUTO: 0 THOUSAND/UL (ref 0.6–4.47)
MAGNESIUM SERPL-MCNC: 2.1 MG/DL (ref 1.9–2.7)
MCH RBC QN AUTO: 30 PG (ref 26.8–34.3)
MCH RBC QN AUTO: 30.6 PG (ref 26.8–34.3)
MCH RBC QN AUTO: 31.6 PG (ref 26.8–34.3)
MCHC RBC AUTO-ENTMCNC: 30.4 G/DL (ref 31.4–37.4)
MCHC RBC AUTO-ENTMCNC: 31.9 G/DL (ref 31.4–37.4)
MCHC RBC AUTO-ENTMCNC: 32.6 G/DL (ref 31.4–37.4)
MCV RBC AUTO: 101 FL (ref 82–98)
MCV RBC AUTO: 92 FL (ref 82–98)
MCV RBC AUTO: 99 FL (ref 82–98)
METAMYELOCYTES NFR BLD MANUAL: 1 % (ref 0–1)
MONOCYTES # BLD AUTO: 0.19 THOUSAND/UL (ref 0–1.22)
MONOCYTES NFR BLD: 2 % (ref 4–12)
MYCOBACTERIUM SPEC CULT: NORMAL
NEUTROPHILS # BLD MANUAL: 9.42 THOUSAND/UL (ref 1.85–7.62)
NEUTS BAND NFR BLD MANUAL: 14 % (ref 0–8)
NEUTS SEG NFR BLD AUTO: 83 % (ref 43–75)
NRBC BLD AUTO-RTO: 11 /100 WBC (ref 0–2)
NRBC BLD AUTO-RTO: 11 /100 WBC (ref 0–2)
O2 CT BLDA-SCNC: 11.7 ML/DL (ref 16–23)
O2 CT BLDA-SCNC: 12 ML/DL (ref 16–23)
O2 CT BLDA-SCNC: 8.6 ML/DL (ref 16–23)
OVALOCYTES BLD QL SMEAR: PRESENT
OVALOCYTES BLD QL SMEAR: PRESENT
OXYHGB MFR BLDA: 97.1 % (ref 94–97)
OXYHGB MFR BLDA: 97.5 % (ref 94–97)
OXYHGB MFR BLDA: 97.5 % (ref 94–97)
P AXIS: 60 DEGREES
PATHOLOGY REVIEW: YES
PATHOLOGY REVIEW: YES
PCO2 BLDA: 40.5 MM HG (ref 36–44)
PCO2 BLDA: 40.7 MM HG (ref 36–44)
PCO2 BLDA: 54 MM HG (ref 36–44)
PH BLDA: 7.25 [PH] (ref 7.35–7.45)
PH BLDA: 7.33 [PH] (ref 7.35–7.45)
PH BLDA: 7.34 [PH] (ref 7.35–7.45)
PLATELET # BLD AUTO: 179 THOUSANDS/UL (ref 149–390)
PLATELET # BLD AUTO: 205 THOUSANDS/UL (ref 149–390)
PLATELET # BLD AUTO: 207 THOUSANDS/UL (ref 149–390)
PLATELET BLD QL SMEAR: ADEQUATE
PLATELET BLD QL SMEAR: ADEQUATE
PMV BLD AUTO: 11.9 FL (ref 8.9–12.7)
PMV BLD AUTO: 11.9 FL (ref 8.9–12.7)
PMV BLD AUTO: 12.1 FL (ref 8.9–12.7)
PO2 BLDA: 115.3 MM HG (ref 75–129)
PO2 BLDA: 132.6 MM HG (ref 75–129)
PO2 BLDA: 142.2 MM HG (ref 75–129)
POIKILOCYTOSIS BLD QL SMEAR: PRESENT
POIKILOCYTOSIS BLD QL SMEAR: PRESENT
POLYCHROMASIA BLD QL SMEAR: PRESENT
POLYCHROMASIA BLD QL SMEAR: PRESENT
POTASSIUM SERPL-SCNC: 3 MMOL/L (ref 3.5–5.3)
POTASSIUM SERPL-SCNC: 5 MMOL/L (ref 3.5–5.3)
PR INTERVAL: 140 MS
PROTHROMBIN TIME: 16.8 SECONDS (ref 11.6–14.5)
QRS AXIS: 36 DEGREES
QRSD INTERVAL: 68 MS
QT INTERVAL: 294 MS
QTC INTERVAL: 397 MS
RBC # BLD AUTO: 1.87 MILLION/UL (ref 3.81–5.12)
RBC # BLD AUTO: 1.93 MILLION/UL (ref 3.81–5.12)
RBC # BLD AUTO: 2.57 MILLION/UL (ref 3.81–5.12)
RETICS # AUTO: ABNORMAL 10*3/UL (ref 14097–95744)
RETICS # CALC: 5.04 % (ref 0.37–1.87)
RH BLD: NEGATIVE
RHODAMINE-AURAMINE STN SPEC: NORMAL
SODIUM SERPL-SCNC: 143 MMOL/L (ref 135–147)
SODIUM SERPL-SCNC: 145 MMOL/L (ref 135–147)
SPECIMEN EXPIRATION DATE: NORMAL
SPECIMEN SOURCE: ABNORMAL
SPECIMEN SOURCE: ABNORMAL
T WAVE AXIS: 54 DEGREES
VANCOMYCIN SERPL-MCNC: 31 UG/ML (ref 10–20)
VENTRICULAR RATE: 110 BPM
WBC # BLD AUTO: 11.46 THOUSAND/UL (ref 4.31–10.16)
WBC # BLD AUTO: 11.99 THOUSAND/UL (ref 4.31–10.16)
WBC # BLD AUTO: 9.71 THOUSAND/UL (ref 4.31–10.16)
WBC NRBC COR # BLD: 9.71 THOUSAND/UL (ref 4.31–10.16)

## 2024-03-12 PROCEDURE — 31502 CHANGE OF WINDPIPE AIRWAY: CPT | Performed by: INTERNAL MEDICINE

## 2024-03-12 PROCEDURE — 86923 COMPATIBILITY TEST ELECTRIC: CPT

## 2024-03-12 PROCEDURE — 85027 COMPLETE CBC AUTOMATED: CPT | Performed by: STUDENT IN AN ORGANIZED HEALTH CARE EDUCATION/TRAINING PROGRAM

## 2024-03-12 PROCEDURE — 94760 N-INVAS EAR/PLS OXIMETRY 1: CPT

## 2024-03-12 PROCEDURE — 94640 AIRWAY INHALATION TREATMENT: CPT

## 2024-03-12 PROCEDURE — 85384 FIBRINOGEN ACTIVITY: CPT | Performed by: STUDENT IN AN ORGANIZED HEALTH CARE EDUCATION/TRAINING PROGRAM

## 2024-03-12 PROCEDURE — 86140 C-REACTIVE PROTEIN: CPT | Performed by: STUDENT IN AN ORGANIZED HEALTH CARE EDUCATION/TRAINING PROGRAM

## 2024-03-12 PROCEDURE — 80048 BASIC METABOLIC PNL TOTAL CA: CPT | Performed by: STUDENT IN AN ORGANIZED HEALTH CARE EDUCATION/TRAINING PROGRAM

## 2024-03-12 PROCEDURE — 82948 REAGENT STRIP/BLOOD GLUCOSE: CPT

## 2024-03-12 PROCEDURE — P9040 RBC LEUKOREDUCED IRRADIATED: HCPCS

## 2024-03-12 PROCEDURE — 86900 BLOOD TYPING SEROLOGIC ABO: CPT | Performed by: STUDENT IN AN ORGANIZED HEALTH CARE EDUCATION/TRAINING PROGRAM

## 2024-03-12 PROCEDURE — 85060 BLOOD SMEAR INTERPRETATION: CPT | Performed by: PATHOLOGY

## 2024-03-12 PROCEDURE — NC001 PR NO CHARGE: Performed by: SURGERY

## 2024-03-12 PROCEDURE — 0B110F4 BYPASS TRACHEA TO CUTANEOUS WITH TRACHEOSTOMY DEVICE, OPEN APPROACH: ICD-10-PCS | Performed by: OBSTETRICS & GYNECOLOGY

## 2024-03-12 PROCEDURE — 83735 ASSAY OF MAGNESIUM: CPT | Performed by: STUDENT IN AN ORGANIZED HEALTH CARE EDUCATION/TRAINING PROGRAM

## 2024-03-12 PROCEDURE — 99233 SBSQ HOSP IP/OBS HIGH 50: CPT | Performed by: STUDENT IN AN ORGANIZED HEALTH CARE EDUCATION/TRAINING PROGRAM

## 2024-03-12 PROCEDURE — 85379 FIBRIN DEGRADATION QUANT: CPT | Performed by: STUDENT IN AN ORGANIZED HEALTH CARE EDUCATION/TRAINING PROGRAM

## 2024-03-12 PROCEDURE — 82805 BLOOD GASES W/O2 SATURATION: CPT | Performed by: STUDENT IN AN ORGANIZED HEALTH CARE EDUCATION/TRAINING PROGRAM

## 2024-03-12 PROCEDURE — 93010 ELECTROCARDIOGRAM REPORT: CPT | Performed by: INTERNAL MEDICINE

## 2024-03-12 PROCEDURE — 99024 POSTOP FOLLOW-UP VISIT: CPT | Performed by: SURGERY

## 2024-03-12 PROCEDURE — 31502 CHANGE OF WINDPIPE AIRWAY: CPT

## 2024-03-12 PROCEDURE — 86850 RBC ANTIBODY SCREEN: CPT | Performed by: STUDENT IN AN ORGANIZED HEALTH CARE EDUCATION/TRAINING PROGRAM

## 2024-03-12 PROCEDURE — 85610 PROTHROMBIN TIME: CPT

## 2024-03-12 PROCEDURE — 80202 ASSAY OF VANCOMYCIN: CPT | Performed by: INTERNAL MEDICINE

## 2024-03-12 PROCEDURE — 94003 VENT MGMT INPAT SUBQ DAY: CPT

## 2024-03-12 PROCEDURE — 86901 BLOOD TYPING SEROLOGIC RH(D): CPT | Performed by: STUDENT IN AN ORGANIZED HEALTH CARE EDUCATION/TRAINING PROGRAM

## 2024-03-12 PROCEDURE — 93005 ELECTROCARDIOGRAM TRACING: CPT

## 2024-03-12 PROCEDURE — 85730 THROMBOPLASTIN TIME PARTIAL: CPT | Performed by: STUDENT IN AN ORGANIZED HEALTH CARE EDUCATION/TRAINING PROGRAM

## 2024-03-12 PROCEDURE — 0BJ08ZZ INSPECTION OF TRACHEOBRONCHIAL TREE, VIA NATURAL OR ARTIFICIAL OPENING ENDOSCOPIC: ICD-10-PCS | Performed by: INTERNAL MEDICINE

## 2024-03-12 PROCEDURE — 80048 BASIC METABOLIC PNL TOTAL CA: CPT

## 2024-03-12 PROCEDURE — 85007 BL SMEAR W/DIFF WBC COUNT: CPT | Performed by: STUDENT IN AN ORGANIZED HEALTH CARE EDUCATION/TRAINING PROGRAM

## 2024-03-12 PROCEDURE — 85045 AUTOMATED RETICULOCYTE COUNT: CPT | Performed by: STUDENT IN AN ORGANIZED HEALTH CARE EDUCATION/TRAINING PROGRAM

## 2024-03-12 RX ORDER — MIDAZOLAM HYDROCHLORIDE 2 MG/2ML
4 INJECTION, SOLUTION INTRAMUSCULAR; INTRAVENOUS ONCE
Status: COMPLETED | OUTPATIENT
Start: 2024-03-12 | End: 2024-03-12

## 2024-03-12 RX ORDER — FENTANYL CITRATE 50 UG/ML
50 INJECTION, SOLUTION INTRAMUSCULAR; INTRAVENOUS
Status: DISCONTINUED | OUTPATIENT
Start: 2024-03-12 | End: 2024-03-14

## 2024-03-12 RX ORDER — POTASSIUM CHLORIDE 14.9 MG/ML
20 INJECTION INTRAVENOUS ONCE
Qty: 100 ML | Refills: 0 | Status: COMPLETED | OUTPATIENT
Start: 2024-03-12 | End: 2024-03-12

## 2024-03-12 RX ORDER — PROPOFOL 10 MG/ML
100 INJECTION, EMULSION INTRAVENOUS ONCE
Status: DISCONTINUED | OUTPATIENT
Start: 2024-03-12 | End: 2024-03-16

## 2024-03-12 RX ORDER — FENTANYL CITRATE 50 UG/ML
200 INJECTION, SOLUTION INTRAMUSCULAR; INTRAVENOUS ONCE
Status: COMPLETED | OUTPATIENT
Start: 2024-03-12 | End: 2024-03-12

## 2024-03-12 RX ORDER — FENTANYL CITRATE/PF 50 MCG/ML
SYRINGE (ML) INJECTION
Status: COMPLETED
Start: 2024-03-12 | End: 2024-03-12

## 2024-03-12 RX ORDER — ENOXAPARIN SODIUM 100 MG/ML
40 INJECTION SUBCUTANEOUS
Status: CANCELLED | OUTPATIENT
Start: 2024-03-13

## 2024-03-12 RX ORDER — ENOXAPARIN SODIUM 100 MG/ML
40 INJECTION SUBCUTANEOUS
Status: DISCONTINUED | OUTPATIENT
Start: 2024-03-12 | End: 2024-03-13

## 2024-03-12 RX ORDER — POTASSIUM CHLORIDE 29.8 MG/ML
40 INJECTION INTRAVENOUS ONCE
Status: COMPLETED | OUTPATIENT
Start: 2024-03-12 | End: 2024-03-12

## 2024-03-12 RX ORDER — POTASSIUM CHLORIDE 29.8 MG/ML
40 INJECTION INTRAVENOUS ONCE
Status: DISCONTINUED | OUTPATIENT
Start: 2024-03-12 | End: 2024-03-12

## 2024-03-12 RX ORDER — VECURONIUM BROMIDE 1 MG/ML
10 INJECTION, POWDER, LYOPHILIZED, FOR SOLUTION INTRAVENOUS ONCE
Status: COMPLETED | OUTPATIENT
Start: 2024-03-12 | End: 2024-03-12

## 2024-03-12 RX ORDER — WATER 10 ML/10ML
INJECTION INTRAMUSCULAR; INTRAVENOUS; SUBCUTANEOUS
Status: COMPLETED
Start: 2024-03-12 | End: 2024-03-12

## 2024-03-12 RX ORDER — VANCOMYCIN HYDROCHLORIDE 500 MG/100ML
500 INJECTION, SOLUTION INTRAVENOUS DAILY PRN
Status: DISCONTINUED | OUTPATIENT
Start: 2024-03-12 | End: 2024-03-13

## 2024-03-12 RX ORDER — MIDAZOLAM HYDROCHLORIDE 2 MG/2ML
INJECTION, SOLUTION INTRAMUSCULAR; INTRAVENOUS
Status: COMPLETED
Start: 2024-03-12 | End: 2024-03-12

## 2024-03-12 RX ORDER — POTASSIUM CHLORIDE 14.9 MG/ML
20 INJECTION INTRAVENOUS ONCE
Status: DISCONTINUED | OUTPATIENT
Start: 2024-03-12 | End: 2024-03-12

## 2024-03-12 RX ORDER — LIDOCAINE HCL/EPINEPHRINE/PF 2%-1:200K
1 VIAL (ML) INJECTION ONCE
Status: COMPLETED | OUTPATIENT
Start: 2024-03-12 | End: 2024-03-12

## 2024-03-12 RX ADMIN — NOREPINEPHRINE BITARTRATE 3 MCG/MIN: 1 SOLUTION INTRAVENOUS at 02:56

## 2024-03-12 RX ADMIN — FENTANYL CITRATE 50 MCG: 50 INJECTION INTRAMUSCULAR; INTRAVENOUS at 22:29

## 2024-03-12 RX ADMIN — POTASSIUM CHLORIDE 20 MEQ: 14.9 INJECTION, SOLUTION INTRAVENOUS at 12:20

## 2024-03-12 RX ADMIN — LEVALBUTEROL HYDROCHLORIDE 1.25 MG: 1.25 SOLUTION RESPIRATORY (INHALATION) at 19:06

## 2024-03-12 RX ADMIN — REMDESIVIR 100 MG: 100 INJECTION, POWDER, LYOPHILIZED, FOR SOLUTION INTRAVENOUS at 15:43

## 2024-03-12 RX ADMIN — Medication 50 MCG/HR: at 05:52

## 2024-03-12 RX ADMIN — MIDAZOLAM HYDROCHLORIDE 4 MG: 2 INJECTION, SOLUTION INTRAMUSCULAR; INTRAVENOUS at 11:35

## 2024-03-12 RX ADMIN — FAMOTIDINE 20 MG: 20 TABLET, FILM COATED ORAL at 17:38

## 2024-03-12 RX ADMIN — LEVALBUTEROL HYDROCHLORIDE 1.25 MG: 1.25 SOLUTION RESPIRATORY (INHALATION) at 07:40

## 2024-03-12 RX ADMIN — CEFEPIME 2000 MG: 2 INJECTION, POWDER, FOR SOLUTION INTRAVENOUS at 08:03

## 2024-03-12 RX ADMIN — METHYLPREDNISOLONE SODIUM SUCCINATE 60 MG: 125 INJECTION, POWDER, FOR SOLUTION INTRAMUSCULAR; INTRAVENOUS at 05:35

## 2024-03-12 RX ADMIN — PROPOFOL 5 MCG/KG/MIN: 10 INJECTION, EMULSION INTRAVENOUS at 18:01

## 2024-03-12 RX ADMIN — LIDOCAINE HYDROCHLORIDE,EPINEPHRINE BITARTRATE 1 ML: 20; .005 INJECTION, SOLUTION EPIDURAL; INFILTRATION; INTRACAUDAL; PERINEURAL at 12:14

## 2024-03-12 RX ADMIN — IPRATROPIUM BROMIDE 0.5 MG: 0.5 SOLUTION RESPIRATORY (INHALATION) at 13:39

## 2024-03-12 RX ADMIN — Medication 2 SPRAY: at 01:59

## 2024-03-12 RX ADMIN — VENLAFAXINE 25 MG: 25 TABLET ORAL at 08:05

## 2024-03-12 RX ADMIN — METHYLPREDNISOLONE SODIUM SUCCINATE 60 MG: 125 INJECTION, POWDER, FOR SOLUTION INTRAMUSCULAR; INTRAVENOUS at 12:19

## 2024-03-12 RX ADMIN — METHYLPREDNISOLONE SODIUM SUCCINATE 60 MG: 125 INJECTION, POWDER, FOR SOLUTION INTRAMUSCULAR; INTRAVENOUS at 23:59

## 2024-03-12 RX ADMIN — MIDAZOLAM 4 MG: 1 INJECTION INTRAMUSCULAR; INTRAVENOUS at 11:35

## 2024-03-12 RX ADMIN — SODIUM CHLORIDE 12 UNITS/HR: 9 INJECTION, SOLUTION INTRAVENOUS at 03:54

## 2024-03-12 RX ADMIN — IPRATROPIUM BROMIDE 0.5 MG: 0.5 SOLUTION RESPIRATORY (INHALATION) at 19:06

## 2024-03-12 RX ADMIN — LAMOTRIGINE 150 MG: 100 TABLET ORAL at 17:38

## 2024-03-12 RX ADMIN — Medication 2 SPRAY: at 17:39

## 2024-03-12 RX ADMIN — POTASSIUM CHLORIDE 40 MEQ: 29.8 INJECTION, SOLUTION INTRAVENOUS at 08:03

## 2024-03-12 RX ADMIN — WATER 10 ML: 1 INJECTION INTRAMUSCULAR; INTRAVENOUS; SUBCUTANEOUS at 11:30

## 2024-03-12 RX ADMIN — METHYLPREDNISOLONE SODIUM SUCCINATE 60 MG: 125 INJECTION, POWDER, FOR SOLUTION INTRAMUSCULAR; INTRAVENOUS at 17:38

## 2024-03-12 RX ADMIN — METRONIDAZOLE 500 MG: 500 TABLET ORAL at 14:03

## 2024-03-12 RX ADMIN — POTASSIUM CHLORIDE 40 MEQ: 29.8 INJECTION, SOLUTION INTRAVENOUS at 06:41

## 2024-03-12 RX ADMIN — VECURONIUM BROMIDE 10 MG: 1 INJECTION, POWDER, LYOPHILIZED, FOR SOLUTION INTRAVENOUS at 11:30

## 2024-03-12 RX ADMIN — NYSTATIN: 100000 POWDER TOPICAL at 17:39

## 2024-03-12 RX ADMIN — CEFEPIME 2000 MG: 2 INJECTION, POWDER, FOR SOLUTION INTRAVENOUS at 15:43

## 2024-03-12 RX ADMIN — Medication 2 SPRAY: at 10:18

## 2024-03-12 RX ADMIN — Medication 2 SPRAY: at 14:03

## 2024-03-12 RX ADMIN — METRONIDAZOLE 500 MG: 500 TABLET ORAL at 05:35

## 2024-03-12 RX ADMIN — POTASSIUM PHOSPHATE, MONOBASIC POTASSIUM PHOSPHATE, DIBASIC 12 MMOL: 224; 236 INJECTION, SOLUTION, CONCENTRATE INTRAVENOUS at 08:04

## 2024-03-12 RX ADMIN — CHLORHEXIDINE GLUCONATE 0.12% ORAL RINSE 15 ML: 1.2 LIQUID ORAL at 21:19

## 2024-03-12 RX ADMIN — NIRMATRELVIR AND RITONAVIR 3 TABLET: KIT at 08:05

## 2024-03-12 RX ADMIN — TOPIRAMATE 100 MG: 100 TABLET, FILM COATED ORAL at 08:04

## 2024-03-12 RX ADMIN — FAMOTIDINE 20 MG: 20 TABLET, FILM COATED ORAL at 08:03

## 2024-03-12 RX ADMIN — LAMOTRIGINE 150 MG: 100 TABLET ORAL at 08:03

## 2024-03-12 RX ADMIN — VANCOMYCIN HYDROCHLORIDE 500 MG: 500 INJECTION, SOLUTION INTRAVENOUS at 06:07

## 2024-03-12 RX ADMIN — ENOXAPARIN SODIUM 40 MG: 40 INJECTION SUBCUTANEOUS at 21:19

## 2024-03-12 RX ADMIN — NIRMATRELVIR AND RITONAVIR 3 TABLET: KIT at 17:39

## 2024-03-12 RX ADMIN — CEFEPIME 2000 MG: 2 INJECTION, POWDER, FOR SOLUTION INTRAVENOUS at 23:58

## 2024-03-12 RX ADMIN — NYSTATIN: 100000 POWDER TOPICAL at 08:06

## 2024-03-12 RX ADMIN — Medication 2 SPRAY: at 05:35

## 2024-03-12 RX ADMIN — TOPIRAMATE 100 MG: 100 TABLET, FILM COATED ORAL at 17:38

## 2024-03-12 RX ADMIN — CHLORHEXIDINE GLUCONATE 0.12% ORAL RINSE 15 ML: 1.2 LIQUID ORAL at 08:03

## 2024-03-12 RX ADMIN — LEVALBUTEROL HYDROCHLORIDE 1.25 MG: 1.25 SOLUTION RESPIRATORY (INHALATION) at 13:39

## 2024-03-12 RX ADMIN — PROPOFOL 30 MCG/KG/MIN: 10 INJECTION, EMULSION INTRAVENOUS at 12:12

## 2024-03-12 RX ADMIN — IPRATROPIUM BROMIDE 0.5 MG: 0.5 SOLUTION RESPIRATORY (INHALATION) at 07:40

## 2024-03-12 RX ADMIN — Medication 2 SPRAY: at 21:19

## 2024-03-12 RX ADMIN — FENTANYL CITRATE 200 MCG: 50 INJECTION INTRAMUSCULAR; INTRAVENOUS at 11:35

## 2024-03-12 NOTE — PHYSICAL THERAPY NOTE
Physical Therapy Cancellation Note    PT orders received chart review completed. Pt is currently intubated/sedated and not appropriate to participate in skilled PT at this time. PT will follow and re-eval as medically appropriate.     03/12/24 1400   Note Type   Note type Cancelled Session   Cancel Reasons Intubated/sedated       Tania Augustin, PT

## 2024-03-12 NOTE — OCCUPATIONAL THERAPY NOTE
Occupational Therapy         Patient Name: Carla Hunt  Today's Date: 3/12/2024         03/12/24 1400   OT Last Visit   OT Visit Date 03/12/24   Note Type   Note Type Treatment   Cancel Reasons Intubated/sedated  (pt placed back on ventilator - will defer)       Shoshana Garcia

## 2024-03-12 NOTE — RESPIRATORY THERAPY NOTE
RT Ventilator Management Note  Carla Hunt 59 y.o. female MRN: [id number]  Unit/Bed#: [facility] 10 Encounter: [id number]      Daily Screen         3/11/2024  1250 3/12/2024  0741          Patient safety screen outcome:: Failed Failed      Not Ready for Weaning due to:: Underline problem not resolved Underline problem not resolved                Physical Exam:   Assessment Type: Assess only      Resp Comments: Decreased pt fio2 to 50%, increased pt pressure control to 16. fellow to order abg after changes. No other vent changes made at this time, will cotntinue to monitor pt per resp protocol.

## 2024-03-12 NOTE — PROGRESS NOTES
Spiritual Care Progress Note    3/12/2024  Patient: Carla Hunt : 1965  Admission Date & Time: 1/10/2024 1941  MRN: [id number] CSN: [id number]      Fr Daugherty met with the pt and pt's family and provided prayers and blessings. No further needs were expressed at this time.    Chaplains still remain available.       24 1300   Clinical Encounter Type   Visited With Patient and family together   Moravian Encounters   Moravian Needs Prayer

## 2024-03-12 NOTE — PROGRESS NOTES
GENERAL SURGERY QUICK NOTE    Performed midline staple removal of the patient's midline incision. Some skin dehiscence appreciated at the inferior aspect of the wound. No obvious pus. A minor amount of bleeding. Continue with wound care per nursing team. See attached image for a visual of the wound:        Contact the RED surgery team with any questions or concerns.    Lc Stewart MD  General Surgery

## 2024-03-12 NOTE — PLAN OF CARE
Problem: Prexisting or High Potential for Compromised Skin Integrity  Goal: Skin integrity is maintained or improved  Description: INTERVENTIONS:  - Identify patients at risk for skin breakdown  - Assess and monitor skin integrity  - Assess and monitor nutrition and hydration status  - Monitor labs   - Assess for incontinence   - Turn and reposition patient  - Assist with mobility/ambulation  - Relieve pressure over bony prominences  - Avoid friction and shearing  - Provide appropriate hygiene as needed including keeping skin clean and dry  - Evaluate need for skin moisturizer/barrier cream  - Collaborate with interdisciplinary team   - Patient/family teaching  - Consider wound care consult   Outcome: Progressing     Problem: Nutrition/Hydration-ADULT  Goal: Nutrient/Hydration intake appropriate for improving, restoring or maintaining nutritional needs  Description: Monitor and assess patient's nutrition/hydration status for malnutrition. Collaborate with interdisciplinary team and initiate plan and interventions as ordered.  Monitor patient's weight and dietary intake as ordered or per policy. Utilize nutrition screening tool and intervene as necessary. Determine patient's food preferences and provide high-protein, high-caloric foods as appropriate.     INTERVENTIONS:  - Monitor oral intake, urinary output, labs, and treatment plans  - Assess nutrition and hydration status and recommend course of action  - Evaluate amount of meals eaten  - Assist patient with eating if necessary   - Allow adequate time for meals  - Recommend/ encourage appropriate diets, oral nutritional supplements, and vitamin/mineral supplements  - Order, calculate, and assess calorie counts as needed  - Recommend, monitor, and adjust tube feedings and TPN/PPN based on assessed needs  - Assess need for intravenous fluids  - Provide specific nutrition/hydration education as appropriate  - Include patient/family/caregiver in decisions related to  nutrition  Outcome: Progressing     Problem: INFECTION - ADULT  Goal: Absence or prevention of progression during hospitalization  Description: INTERVENTIONS:  - Assess and monitor for signs and symptoms of infection  - Monitor lab/diagnostic results  - Monitor all insertion sites, i.e. indwelling lines, tubes, and drains  - Monitor endotracheal if appropriate and nasal secretions for changes in amount and color  - Vida appropriate cooling/warming therapies per order  - Administer medications as ordered  - Instruct and encourage patient and family to use good hand hygiene technique  - Identify and instruct in appropriate isolation precautions for identified infection/condition  Outcome: Progressing     Problem: SAFETY ADULT  Goal: Patient will remain free of falls  Description: INTERVENTIONS:  - Educate patient/family on patient safety including physical limitations  - Instruct patient to call for assistance with activity   - Consult OT/PT to assist with strengthening/mobility   - Keep Call bell within reach  - Keep bed low and locked with side rails adjusted as appropriate  - Keep care items and personal belongings within reach  - Initiate and maintain comfort rounds  - Make Fall Risk Sign visible to staff  - Offer Toileting every 2 Hours, in advance of need  - Initiate/Maintain bed alarm  - Obtain necessary fall risk management equipment: non skid footwear  - Apply yellow socks and bracelet for high fall risk patients  - Consider moving patient to room near nurses station  Outcome: Progressing     Problem: RESPIRATORY - ADULT  Goal: Achieves optimal ventilation and oxygenation  Description: INTERVENTIONS:  - Assess for changes in respiratory status  - Assess for changes in mentation and behavior  - Position to facilitate oxygenation and minimize respiratory effort  - Oxygen administered by appropriate delivery if ordered  - Initiate smoking cessation education as indicated  - Encourage broncho-pulmonary  hygiene including cough, deep breathe, Incentive Spirometry  - Assess the need for suctioning and aspirate as needed  - Assess and instruct to report SOB or any respiratory difficulty  - Respiratory Therapy support as indicated  Outcome: Progressing     Problem: SKIN/TISSUE INTEGRITY - ADULT  Goal: Skin Integrity remains intact(Skin Breakdown Prevention)  Description: Assess:  -Perform Flavio assessment every shift   -Clean and moisturize skin every day   -Inspect skin when repositioning, toileting, and assisting with ADLS  -Assess under medical devices such as ronny  every hour   -Assess extremities for adequate circulation and sensation     Bed Management:  -Have minimal linens on bed & keep smooth, unwrinkled  -Change linens as needed when moist or perspiring  -Avoid sitting or lying in one position for more than 2 hours while in bed  -Keep HOB at 45 degrees     Toileting:  -Offer bedside commode  -Assess for incontinence every hour  -Use incontinent care products after each incontinent episode such as moisture barrier cream     Activity:  -Mobilize patient 3 times a day  -Encourage activity and walks on unit  -Encourage or provide ROM exercises   -Turn and reposition patient every 2 Hours  -Use appropriate equipment to lift or move patient in bed  -Instruct/ Assist with weight shifting every hour when out of bed in chair  -Consider limitation of chair time 2 hour intervals    Skin Care:  -Avoid use of baby powder, tape, friction and shearing, hot water or constrictive clothing  -Relieve pressure over bony prominences using allevyn   -Do not massage red bony areas    Next Steps:  -Teach patient strategies to minimize risks such as weight shifting    -Consider consults to  interdisciplinary teams such as PT  Outcome: Progressing  Goal: Incision(s), wounds(s) or drain site(s) healing without S/S of infection  Description: INTERVENTIONS  - Assess and document dressing, incision, wound bed, drain sites and  surrounding tissue  - Provide patient and family education  - Perform skin care/dressing changes every 12 hr  Outcome: Progressing  Goal: Pressure injury heals and does not worsen  Description: Interventions:  - Implement low air loss mattress or specialty surface (Criteria met)  - Apply silicone foam dressing  - Instruct/assist with weight shifting every 2 hr minutes when in chair   - Limit chair time to 6 hour intervals  - Use special pressure reducing interventions such as wedges when in chair   - Apply fecal or urinary incontinence containment device   - Perform passive or active ROM every 4 hr  - Turn and reposition patient & offload bony prominences every 2 hours   - Utilize friction reducing device or surface for transfers   - Consider consults to  interdisciplinary teams such as pt/ot  - Use incontinent care products after each incontinent episode such as incontinence foam  - Consider nutrition services referral as needed  Outcome: Progressing     Problem: NEUROSENSORY - ADULT  Goal: Achieves stable or improved neurological status  Description: INTERVENTIONS  - Monitor and report changes in neurological status  - Monitor vital signs such as temperature, blood pressure, glucose, and any other labs ordered   - Initiate measures to prevent increased intracranial pressure  - Monitor for seizure activity and implement precautions if appropriate      Outcome: Progressing  Goal: Achieves maximal functionality and self care  Description: INTERVENTIONS  - Monitor swallowing and airway patency with patient fatigue and changes in neurological status  - Encourage and assist patient to increase activity and self care.   - Encourage visually impaired, hearing impaired and aphasic patients to use assistive/communication devices  Outcome: Progressing     Problem: CARDIOVASCULAR - ADULT  Goal: Maintains optimal cardiac output and hemodynamic stability  Description: INTERVENTIONS:  - Monitor I/O, vital signs and rhythm  -  Monitor for S/S and trends of decreased cardiac output  - Administer and titrate ordered vasoactive medications to optimize hemodynamic stability  - Assess quality of pulses, skin color and temperature  - Assess for signs of decreased coronary artery perfusion  - Instruct patient to report change in severity of symptoms  Outcome: Progressing  Goal: Absence of cardiac dysrhythmias or at baseline rhythm  Description: INTERVENTIONS:  - Continuous cardiac monitoring, vital signs, obtain 12 lead EKG if ordered  - Administer antiarrhythmic and heart rate control medications as ordered  - Monitor electrolytes and administer replacement therapy as ordered  Outcome: Progressing     Problem: GASTROINTESTINAL - ADULT  Goal: Minimal or absence of nausea and/or vomiting  Description: INTERVENTIONS:  - Administer IV fluids if ordered to ensure adequate hydration  - Maintain NPO status until nausea and vomiting are resolved  - Nasogastric tube if ordered  - Administer ordered antiemetic medications as needed  - Provide nonpharmacologic comfort measures as appropriate  - Advance diet as tolerated, if ordered  - Consider nutrition services referral to assist patient with adequate nutrition and appropriate food choices  Outcome: Progressing  Goal: Maintains or returns to baseline bowel function  Description: INTERVENTIONS:  - Assess bowel function  - Encourage oral fluids to ensure adequate hydration  - Administer IV fluids if ordered to ensure adequate hydration  - Administer ordered medications as needed  - Encourage mobilization and activity  - Consider nutritional services referral to assist patient with adequate nutrition and appropriate food choices  Outcome: Progressing  Goal: Maintains adequate nutritional intake  Description: INTERVENTIONS:  - Monitor percentage of each meal consumed  - Identify factors contributing to decreased intake, treat as appropriate  - Assist with meals as needed  - Monitor I&O, weight, and lab  values if indicated  - Obtain nutrition services referral as needed  Outcome: Progressing  Goal: Establish and maintain optimal ostomy function  Description: INTERVENTIONS:  - Assess bowel function  - Encourage oral fluids to ensure adequate hydration  - Administer IV fluids if ordered to ensure adequate hydration   - Administer ordered medications as needed  - Encourage mobilization and activity  - Nutrition services referral to assist patient with appropriate food choices  - Assess stoma site  - Consider wound care consult   Outcome: Progressing  Goal: Oral mucous membranes remain intact  Description: INTERVENTIONS  - Assess oral mucosa and hygiene practices  - Implement preventative oral hygiene regimen  - Implement oral medicated treatments as ordered  - Initiate Nutrition services referral as needed  Outcome: Progressing     Problem: GENITOURINARY - ADULT  Goal: Maintains or returns to baseline urinary function  Description: INTERVENTIONS:  - Assess urinary function  - Encourage oral fluids to ensure adequate hydration if ordered  - Administer IV fluids as ordered to ensure adequate hydration  - Administer ordered medications as needed  - Offer frequent toileting  - Follow urinary retention protocol if ordered  Outcome: Progressing  Goal: Urinary catheter remains patent  Description: INTERVENTIONS:  - Assess patency of urinary catheter  - If patient has a chronic marie, consider changing catheter if non-functioning  - Follow guidelines for intermittent irrigation of non-functioning urinary catheter  Outcome: Progressing     Problem: METABOLIC, FLUID AND ELECTROLYTES - ADULT  Goal: Electrolytes maintained within normal limits  Description: INTERVENTIONS:  - Monitor labs and assess patient for signs and symptoms of electrolyte imbalances  - Administer electrolyte replacement as ordered  - Monitor response to electrolyte replacements, including repeat lab results as appropriate  - Instruct patient on fluid and  nutrition as appropriate  Outcome: Progressing  Goal: Fluid balance maintained  Description: INTERVENTIONS:  - Monitor labs   - Monitor I/O and WT  - Instruct patient on fluid and nutrition as appropriate  - Assess for signs & symptoms of volume excess or deficit  Outcome: Progressing  Goal: Glucose maintained within target range  Description: INTERVENTIONS:  - Monitor Blood Glucose as ordered  - Assess for signs and symptoms of hyperglycemia and hypoglycemia  - Administer ordered medications to maintain glucose within target range  - Assess nutritional intake and initiate nutrition service referral as needed  Outcome: Progressing     Problem: MUSCULOSKELETAL - ADULT  Goal: Maintain or return mobility to safest level of function  Description: INTERVENTIONS:  - Assess patient's ability to carry out ADLs; assess patient's baseline for ADL function and identify physical deficits which impact ability to perform ADLs (bathing, care of mouth/teeth, toileting, grooming, dressing, etc.)  - Assess/evaluate cause of self-care deficits   - Assess range of motion  - Assess patient's mobility  - Assess patient's need for assistive devices and provide as appropriate  - Encourage maximum independence but intervene and supervise when necessary  - Involve family in performance of ADLs  - Assess for home care needs following discharge   - Consider OT consult to assist with ADL evaluation and planning for discharge  - Provide patient education as appropriate  Outcome: Progressing     Problem: COPING  Goal: Pt/Family able to verbalize concerns and demonstrate effective coping strategies  Description: INTERVENTIONS:  - Assist patient/family to identify coping skills, available support systems and cultural and spiritual values  - Provide emotional support, including active listening and acknowledgement of concerns of patient and caregivers  - Reduce environmental stimuli, as able  - Provide patient education  - Assess for spiritual  pain/suffering and initiate spiritual care, including notification of Pastoral Care or [place] based community as needed  - Assess effectiveness of coping strategies  Outcome: Progressing  Goal: Will report anxiety at manageable levels  Description: INTERVENTIONS:  - Administer medication as ordered  - Teach and encourage coping skills  - Provide emotional support  - Assess patient/family for anxiety and ability to cope  Outcome: Progressing     Problem: BEHAVIOR  Goal: Pt/Family maintain appropriate behavior and adhere to behavioral management agreement, if implemented  Description: INTERVENTIONS:  - Assess the family dynamic   - Encourage verbalization of thoughts and concerns in a socially appropriate manner  - Assess patient/family's coping skills and non-compliant behavior (including use of illegal substances).  - Utilize positive, consistent limit setting strategies supporting safety of patient, staff and others  - Initiate consult with Case Management, Spiritual Care or other ancillary services as appropriate  - If a patient's/visitor's behavior jeopardizes the safety of the patient, staff, or others, refer to organization procedure.   - Notify Security of behavior or suspected illegal substances which indicate the need for search of the patient and/or belongings  - Encourage participation in the decision making process about a behavioral management agreement; implement if patient meets criteria  Outcome: Progressing     Problem: Potential for Falls  Goal: Patient will remain free of falls  Description: INTERVENTIONS:  - Educate patient/family on patient safety including physical limitations  - Instruct patient to call for assistance with activity   - Consult OT/PT to assist with strengthening/mobility   - Keep Call bell within reach  - Keep bed low and locked with side rails adjusted as appropriate  - Keep care items and personal belongings within reach  - Initiate and maintain comfort rounds  - Make Fall Risk  Sign visible to staff  - Offer Toileting every 2 Hours, in advance of need  - Initiate/Maintain bed alarm  - Obtain necessary fall risk management equipment: non skid footwear  - Apply yellow socks and bracelet for high fall risk patients  - Consider moving patient to room near nurses station  Outcome: Progressing

## 2024-03-12 NOTE — ASSESSMENT & PLAN NOTE
PSC, including MSW support, will follow closely to continue to provide supportive care as clinical situation evolves.   Pastoral care consulted  Decisional apparatus:  Patient is not competent on my exam today.  If competence is lost, patient's substitute decision maker would default to spouse by [place].  Advance Directive / Living Will / POLST:  None on file, unable to complete

## 2024-03-12 NOTE — QUICK NOTE
3/12/2024 5:00 PM -  Carla Hunt's chart and case were reviewed by Hannah Ramirez PA-C.  Mode of review included electronic chart check, and communication with primary.      Per review, patient underwent bedside trach today. No family at bedside during time of visit to MICU. See last note for details.        Palliative care will return on 3/14.          For urgent issues or any questions/concerns, please notify on-call provider via [employer].  You may also call our answering service 24/7 at [phone].    Hannah aRmirez PA-C  Palliative and Supportive Care  Clinic/Answering Service: [phone]  You can find me on [employer]!

## 2024-03-12 NOTE — PROGRESS NOTES
[facility]  Progress Note: Critical Care  Name: Carla Hunt 59 y.o. 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 - noted stomal retraction/necrosis and cellulitis   Toxic/metabolic encephalopathy with likely delirium component and anxiety  Anemia chronic inflammation  B-Cell lymphoma  Minimal SAH POA  Seizure disorder  Steroid induced hyperglycemia  Elevated TG  Hypokalemia  CKD, resolved ELIESER  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 Collapsed IVC noted on bedside US 3/11.  Received IV fluid bolus and albumin with intermittent improvement.   TTE 2/20 with EF 70%, no WMA, no significant valvular dysfunction, G1 DD.  Resumed pressors on 3/11 with vasopressin and Levophed  Currently on Levophed 3mcg.min  Plan:  Continue pressors, wean as able  Maintain MAPs > 65 mmHg     Pulm:  Diagnosis: Acute hypoxic respiratory failure   -In setting of severe COVID, POA, persistently positive  since admission.  -Likely multifactorial due to COVID pneumonitis in setting of persistent COVID-19 infection and possible recurrent bacterial pneumonia  Persistent inflammation and fibrosis also likely given patient has been steroid responsive throughout admission.  Previously completed course of remdesivir/dexamethasone/Actemra.  Resumed Paxlovid and remdesivir after COVID PCR positive on 3/3.  Has also had multiple bronchoscopies throughout admission (2/2, 2/16, 2/21) subsequent Legionella/viral/fungal cultures NGTD, PCP and AFB culture were also negative.  Previously intubated 2/13, extubated 3/1.  Reintubated 3/11 after patient's respiratory status worsened on 3/11 AM despite transitioned to BiPAP and being on high-dose corticosteroid therapy.  Was noted to have increased purulent secretions during re-intubation possibly suggestive of progressive bacterial infection in setting of elevated , however patient has been on broad-spectrum antibiotics, has been afebrile, with improved leukocytosis, and no new focal infiltrates on most recent CT imaging 2/24.  -Repeat COVID PCR 3/11 positive however less likely driving worsening hypoxia given cycle threshold closer to 30, suggestive of lower viral replication/viral shedding.  -CRP trend 109.6 (03/07) > 36.9 (03/08) > 14.8 (03/09) > 112.1 (03/10) > 166 (3/11) > 106 (3/12)  -Repeat CXR 3/12 cardiomegaly, possible right middle lobe infiltrate, increased vascular congestion on the left> right on my read, official read pending.    Plan:  Will trial increased steroids-IV Solu-Medrol 60 mg every 6 hours  Continue Paxlovid/remdesivir through 3/15 to complete 14-day course  Continue cefepime, vancomycin, metronidazole for now  Follow-up BAL cultures and studies including CD4/CD8 count, leukemia/lymphoma panel, pneumocystis smear  Will consider PCP PCR as add-on vs PCP prophylaxis with Bactrim given patient is immunocompromised in setting of B-cell lymphoma and steroid  therapy  Trend CRP daily to guide steroid therapy  ID following, recommend broadening antibiotic coverage from cefepime/Flagyl to meropenem 1 g IV Q8 if patient clinically deteriorates with concerns of progressive sepsis  Await chest x-ray read  Tentative plan for tracheostomy later today pending repeat H&H      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  Was noted to be hypertensive intermittently postintubation on 3/11, bedside ultrasound revealing collapsed IVC.  Given 500 cc Isolyte bolus in addition to albumin with improvement.  E: monitor and replete for goal K>4, P>3, Mg>2; potassium repleted   N: On tube feeds with vital 1.5; currently on hold due to tentative plan for tracheostomy later today  Nutrition following, recommend decreasing vital 1.5 to 35 cc/h and increase Prosource liquid protein to 1 packet twice daily     Heme/Onc:   Diagnosis: Anemia  -Hemoglobin noted to be 5.9 this a.m., repeat 6.0.  Recently has been around 7-8.  Transfused 1 unit irradiated RBCs overnight.   -Has received transfusions intermittently throughout hospitalization; received 1 unit RBC on 3/6; total of 4 units since admission  -Likely multifactorial in nature, acute versus early nearly chronic in the setting of inflammatory state as evidenced by ferritin 974, iron 10, TIBC 173.likely further complicated by chronic anemia due to her other  history of lymphoma and CKD    Plan:  Follow-up repeat H&H at 8 AM today  Trend CBC, transfuse to maintain Hgb>7     Diagnosis: B cell lymphoma  -[facility]  -S/P 6 cycles of chemotherapy   -Maintained on Rituxan for 2 year course PTA, started May 2022  -Last dose was 12/20, treatment currently on hold   -Leukopenic on admission but WBC now limited likely in setting of steroid therapy     Plan:  Holding rituximab in setting of persistent COVID-19 infection     DVT ppx with lovenox; being held in setting of anemia, planned tracheostomy     Endo:   Diagnosis: steroid hyperglycemia and diabetes mellitus type 2, A1c at 6.6 from 01/2024  Goal -180  BG range last 24hrs 140-200s  Plan: On insulin gtt       ID:   Diagnosis: Severe covid pneumonia and recurrent bacterial pneumonia  -Patient has completed multiple treatment courses of remdesivir throughout hospitalization in addition to 7 days of ceftriaxone.   -Most recent BAL cultures in 2/2024 positive for MDR Pseudomonas and stenotrophomonas treated with course of Levaquin  -Completed severe COVID pathway with decadron (ended 1/22) and remdesivir (ended 1/20), also completed 7 days CTX on 1/15  -CT C/A/P 3/10 with persistent groundglass opacities and changes suggestive of sequelae of COVID, prior infections.  Was noted to have worsening hypoxia with purulent secretions requiring reintubation 3/11 however has been afebrile with improved leukocytosis. Repeat BAL cultures pending.  CRP improved to 106 today, compared to 166 on 3/12 patient was given high-dose steroids in addition to scheduled steroids yesterday.  Remains on antivirals and broad-spectrum antibiotics.  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  An ileostomy  with ischemic changes likely contributing to imaging findings and SIRS  - On cefepime, day 9  - Added Flagyl for anaerobic coerage 500 mg Q8H started on 03/08, day 5.   - Vanomycin added back on 3/10, day 8  - Serial examinations  - Gen sugery following, appreciate reccs; no plans for surgical intervention at this time    MSK/Skin:   -Wound care team following for bilateral sacral wounds               Disposition: Critical care    ICU Core Measures     Vented Patient  VAP Bundle  VAP bundle ordered     A: Assess, Prevent, and Manage Pain Has pain been assessed? Yes  Need for changes to pain regimen? No   B: Both Spontaneous Awakening Trials (SATs) and Spontaneous Breathing Trials (SBTs) Plan to perform spontaneous awakening trial today? Yes   Plan to perform spontaneous breathing trial today? Yes   Obvious barriers to extubation? Yes   C: Choice of Sedation RASS Goal: -3 Moderate Sedation, -2 Light Sedation, -1 Drowsy, 0 Alert and Calm, or +1 Restless  Need for changes to sedation or analgesia regimen? No   D: Delirium CAM-ICU: Negative   E: Early Mobility  Plan for early mobility? Yes   F: Family Engagement Plan for family engagement today? Yes       Antibiotic Review: Patient on appropriate coverage based on culture data. , Awaiting culture results. , Continue broad spectrum secondary to severity of illness. , and Infectious disease consulted    Review of Invasive Devices:    Ortiz Plan: Continue for accurate I/O monitoring for 48 hours  Central access plan: Patient has multiple central venous catheters.  Medications requiring central line  Amelia Plan: Keep arterial line for hemodynamic monitoring, frequent ABGs, and frequent labs    Prophylaxis:  VTE Contraindicated secondary to: Anemia, pending tracheostomy    Stress Ulcer  covered byfamotidine (PEPCID) tablet 20 mg [id number]        Significant 24hr Events     24hr events: Noted to be tachypneic with increased WOB despite transitioning to BiPAP 3/11 AM and  receiving IV Solumedrol 250mg in addition to scheduled steroids, requiring re-intubation.  Purulent secretions noted with bronchoscopy prior to intubation.  BAL cultures sent.  Intermittently hypotensive with MAPs of 55-60 around 1600 on 3/11.  Bedside ultrasound revealed collapse IVC.  Was given additional 500 cc bolus of Isolyte in addition to albumin that was given postintubation due to hypotension.  Subsequently started on vasopressin and Levophed to maintain maps over 65.  ABG at 1700 on 3/11 revealed pH 7.10/68.7/22.5/20.8.  Vent settings adjusted overnight with most recent ABG obtained at 4AM today revealing pH 7.2/54/142/23.  Hgb 5.9, repeat 6.  Was transfused 1U irradiated RBC.  DIC labs were ordered, repeat H&H pending at 8 AM.     Subjective     Review of Systems   Unable to perform ROS: Intubated        Objective                            Vitals I/O      Most Recent Min/Max in 24hrs   Temp (!) 97.1 °F (36.2 °C) Temp  Min: 96.7 °F (35.9 °C)  Max: 97.9 °F (36.6 °C)   Pulse 101 Pulse  Min: 94  Max: 135   Resp (!) 30 Resp  Min: 22  Max: 54   /53 BP  Min: 88/54  Max: 233/104   O2 Sat 100 % SpO2  Min: 92 %  Max: 100 %      Intake/Output Summary (Last 24 hours) at 3/12/2024 0629  Last data filed at 3/12/2024 0608  Gross per 24 hour   Intake 4279.46 ml   Output 3765 ml   Net 514.46 ml       Diet NPO    Invasive Monitoring           Physical Exam   Physical Exam  Eyes:      Pupils: Pupils are equal, round, and reactive to light.   Skin:     General: Skin is warm.   HENT:      Mouth/Throat:      Mouth: Mucous membranes are moist.   Neck:      Vascular: Central line present. No JVD.   Cardiovascular:      Rate and Rhythm: Tachycardia present.   Musculoskeletal:         General: No swelling.      Right lower leg: No edema.      Left lower leg: No edema.   Abdominal: General: An ostomy site is present. There is ostomy site.There is no distension.      Palpations: Abdomen is soft.      Tenderness: There is no  abdominal tenderness.   Constitutional:       Appearance: She is ill-appearing.      Interventions: She is sedated and intubated.   Pulmonary:      Effort: Tachypnea present. She is intubated.      Comments: No accessory muscle use  Neurological:      Comments: Intubated and sedated   Genitourinary/Anorectal:  Ortiz present.          Diagnostic Studies      EKG:   3/12:  Sinus tachycardia  Poor R Wave Progression  Nonspecific ST and T wave abnormality  Qtc 397  Imaging:  I have personally reviewed pertinent reports.       Medications:  Scheduled PRN   cefepime, 2,000 mg, Q8H  chlorhexidine, 15 mL, Q12H SARTHAK  famotidine, 20 mg, BID  ipratropium, 0.5 mg, TID  lamoTRIgine, 150 mg, BID  levalbuterol, 1.25 mg, TID  methylPREDNISolone sodium succinate, 60 mg, Q6H SARTHAK  metroNIDAZOLE, 500 mg, Q8H SARTHAK  nirmatrelvir & ritonavir, 3 tablet, BID  nystatin, , BID  polyethylene glycol, 17 g, Daily  potassium chloride, 40 mEq, Once  remdesivir, 100 mg, Q24H  sodium chloride, 2 spray, Q4H  topiramate, 100 mg, BID  vancomycin, 500 mg, Q12H  venlafaxine, 25 mg, Daily      acetaminophen, 650 mg, Q6H PRN  hydrALAZINE, 10 mg, Q6H PRN  OLANZapine, 10 mg, HS PRN  ondansetron, 4 mg, Q6H PRN  oxyCODONE, 2.5 mg, Q4H PRN   Or  oxyCODONE, 5 mg, Q4H PRN  sodium chloride, 1 Application, Q1H PRN       Continuous    fentaNYL, 50 mcg/hr, Last Rate: 50 mcg/hr (03/12/24 0552)  insulin regular (HumuLIN R,NovoLIN R) 1 Units/mL in sodium chloride 0.9 % 100 mL infusion, 0.3-21 Units/hr, Last Rate: 8 Units/hr (03/12/24 0358)  norepinephrine, 1-30 mcg/min, Last Rate: 2 mcg/min (03/12/24 0605)  propofol, 5-50 mcg/kg/min, Last Rate: 10 mcg/kg/min (03/12/24 0202)  vasopressin, 0.04 Units/min, Last Rate: Stopped (03/12/24 0246)         Labs:    CBC    Recent Labs     03/12/24  0401 03/12/24  0446   WBC 11.46* 11.99*   HGB 5.9* 6.0*   HCT 19.4* 19.0*    205     BMP    Recent Labs     03/11/24  0415 03/12/24  0401   SODIUM 147 145   K 4.1 3.0*   *  111*   CO2 21 23   AGAP 11 11   BUN 39* 41*   CREATININE 0.69 0.90   CALCIUM 8.3* 7.9*      Vancomycin trough 31     Coags    Recent Labs     03/12/24  0431 03/12/24  0446   INR 1.39*  --    PTT  --  33       D-dimer .54  Fibrinogen 598     Additional Electrolytes  Recent Labs     03/11/24  0415 03/12/24  0401   MG 2.2 2.1          Blood Gas    Recent Labs     03/11/24  1859 03/11/24  2220 03/12/24  0418   PHART 7.188*   < > 7.248*   LOL1FYN 55.6*   < > 54.0*   PO2ART 112.5   < > 142.2*   LPO2CFA 20.7*   < > 23.0   BEART -7.1   < > -3.9   SOURCE Line, Arterial  --   --     < > = values in this interval not displayed.     Recent Labs     03/11/24  1859   SOURCE Line, Arterial    LFTs  No recent results    Infectious  No recent results     Bronchial gram stain with 3+ polys, 3+ gram variable rods, 1+ budding yeast    AFB/fungal/viral bronchial Cx's pending Glucose  Recent Labs     03/10/24  1806 03/11/24  0028 03/11/24  0415 03/12/24  0401   GLUC 215* 116 238* 128               Joe Lazcano DO

## 2024-03-12 NOTE — PLAN OF CARE
Problem: Prexisting or High Potential for Compromised Skin Integrity  Goal: Skin integrity is maintained or improved  Description: INTERVENTIONS:  - Identify patients at risk for skin breakdown  - Assess and monitor skin integrity  - Assess and monitor nutrition and hydration status  - Monitor labs   - Assess for incontinence   - Turn and reposition patient  - Assist with mobility/ambulation  - Relieve pressure over bony prominences  - Avoid friction and shearing  - Provide appropriate hygiene as needed including keeping skin clean and dry  - Evaluate need for skin moisturizer/barrier cream  - Collaborate with interdisciplinary team   - Patient/family teaching  - Consider wound care consult   Outcome: Progressing     Problem: INFECTION - ADULT  Goal: Absence or prevention of progression during hospitalization  Description: INTERVENTIONS:  - Assess and monitor for signs and symptoms of infection  - Monitor lab/diagnostic results  - Monitor all insertion sites, i.e. indwelling lines, tubes, and drains  - Monitor endotracheal if appropriate and nasal secretions for changes in amount and color  - [place] appropriate cooling/warming therapies per order  - Administer medications as ordered  - Instruct and encourage patient and family to use good hand hygiene technique  - Identify and instruct in appropriate isolation precautions for identified infection/condition  Outcome: Progressing     Problem: SAFETY ADULT  Goal: Patient will remain free of falls  Description: INTERVENTIONS:  - Educate patient/family on patient safety including physical limitations  - Instruct patient to call for assistance with activity   - Consult OT/PT to assist with strengthening/mobility   - Keep Call bell within reach  - Keep bed low and locked with side rails adjusted as appropriate  - Keep care items and personal belongings within reach  - Initiate and maintain comfort rounds  - Make Fall Risk Sign visible to staff  - Offer Toileting every  2 Hours, in advance of need  - Initiate/Maintain bed alarm  - Obtain necessary fall risk management equipment: non skid footwear  - Apply yellow socks and bracelet for high fall risk patients  - Consider moving patient to room near nurses station  Outcome: Progressing     Problem: RESPIRATORY - ADULT  Goal: Achieves optimal ventilation and oxygenation  Description: INTERVENTIONS:  - Assess for changes in respiratory status  - Assess for changes in mentation and behavior  - Position to facilitate oxygenation and minimize respiratory effort  - Oxygen administered by appropriate delivery if ordered  - Initiate smoking cessation education as indicated  - Encourage broncho-pulmonary hygiene including cough, deep breathe, Incentive Spirometry  - Assess the need for suctioning and aspirate as needed  - Assess and instruct to report SOB or any respiratory difficulty  - Respiratory Therapy support as indicated  Outcome: Progressing     Problem: SKIN/TISSUE INTEGRITY - ADULT  Goal: Skin Integrity remains intact(Skin Breakdown Prevention)  Description: Assess:  -Perform Flavio assessment every shift   -Clean and moisturize skin every day   -Inspect skin when repositioning, toileting, and assisting with ADLS  -Assess under medical devices such as ronny  every hour   -Assess extremities for adequate circulation and sensation     Bed Management:  -Have minimal linens on bed & keep smooth, unwrinkled  -Change linens as needed when moist or perspiring  -Avoid sitting or lying in one position for more than 2 hours while in bed  -Keep HOB at 45 degrees     Toileting:  -Offer bedside commode  -Assess for incontinence every hour  -Use incontinent care products after each incontinent episode such as moisture barrier cream     Activity:  -Mobilize patient 3 times a day  -Encourage activity and walks on unit  -Encourage or provide ROM exercises   -Turn and reposition patient every 2 Hours  -Use appropriate equipment to lift or move  patient in bed  -Instruct/ Assist with weight shifting every hour when out of bed in chair  -Consider limitation of chair time 2 hour intervals    Skin Care:  -Avoid use of baby powder, tape, friction and shearing, hot water or constrictive clothing  -Relieve pressure over bony prominences using allevyn   -Do not massage red bony areas    Next Steps:  -Teach patient strategies to minimize risks such as weight shifting    -Consider consults to  interdisciplinary teams such as PT  Outcome: Progressing  Goal: Incision(s), wounds(s) or drain site(s) healing without S/S of infection  Description: INTERVENTIONS  - Assess and document dressing, incision, wound bed, drain sites and surrounding tissue  - Provide patient and family education  - Consider nutrition services referral as needed  Outcome: Progressing     Problem: Potential for Falls  Goal: Patient will remain free of falls  Description: INTERVENTIONS:  - Educate patient/family on patient safety including physical limitations  - Instruct patient to call for assistance with activity   - Consult OT/PT to assist with strengthening/mobility   - Keep Call bell within reach  - Keep bed low and locked with side rails adjusted as appropriate  - Keep care items and personal belongings within reach  - Initiate and maintain comfort rounds  - Make Fall Risk Sign visible to staff  - Offer Toileting every 2 Hours, in advance of need  - Initiate/Maintain bed alarm  - Obtain necessary fall risk management equipment: non skid footwear  - Apply yellow socks and bracelet for high fall risk patients  - Consider moving patient to room near nurses station  Outcome: Progressing     Problem: Nutrition/Hydration-ADULT  Goal: Nutrient/Hydration intake appropriate for improving, restoring or maintaining nutritional needs  Description: Monitor and assess patient's nutrition/hydration status for malnutrition. Collaborate with interdisciplinary team and initiate plan and interventions as  ordered.  Monitor patient's weight and dietary intake as ordered or per policy. Utilize nutrition screening tool and intervene as necessary. Determine patient's food preferences and provide high-protein, high-caloric foods as appropriate.     INTERVENTIONS:  - Monitor oral intake, urinary output, labs, and treatment plans  - Assess nutrition and hydration status and recommend course of action  - Evaluate amount of meals eaten  - Assist patient with eating if necessary   - Allow adequate time for meals  - Recommend/ encourage appropriate diets, oral nutritional supplements, and vitamin/mineral supplements  - Order, calculate, and assess calorie counts as needed  - Recommend, monitor, and adjust tube feedings and TPN/PPN based on assessed needs  - Assess need for intravenous fluids  - Provide specific nutrition/hydration education as appropriate  - Include patient/family/caregiver in decisions related to nutrition  Outcome: Progressing     Problem: SAFETY,RESTRAINT: NV/NON-SELF DESTRUCTIVE BEHAVIOR  Goal: Remains free of harm/injury (restraint for non violent/non self-detsructive behavior)  Description: INTERVENTIONS:  - Instruct patient/family regarding restraint use   - Assess and monitor physiologic and psychological status   - Provide interventions and comfort measures to meet assessed patient needs   - Identify and implement measures to help patient regain control  - Assess readiness for release of restraint   Outcome: Progressing  Goal: Returns to optimal restraint-free functioning  Description: INTERVENTIONS:  - Assess the patient's behavior and symptoms that indicate continued need for restraint  - Identify and implement measures to help patient regain control  - Assess readiness for release of restraint   Outcome: Progressing     Problem: COPING  Goal: Pt/Family able to verbalize concerns and demonstrate effective coping strategies  Description: INTERVENTIONS:  - Assist patient/family to identify coping  skills, available support systems and cultural and spiritual values  - Provide emotional support, including active listening and acknowledgement of concerns of patient and caregivers  - Reduce environmental stimuli, as able  - Provide patient education  - Assess for spiritual pain/suffering and initiate spiritual care, including notification of Pastoral Care or [place] based community as needed  - Assess effectiveness of coping strategies  Outcome: Progressing  Goal: Will report anxiety at manageable levels  Description: INTERVENTIONS:  - Administer medication as ordered  - Teach and encourage coping skills  - Provide emotional support  - Assess patient/family for anxiety and ability to cope  Outcome: Progressing     Problem: BEHAVIOR  Goal: Pt/Family maintain appropriate behavior and adhere to behavioral management agreement, if implemented  Description: INTERVENTIONS:  - Assess the family dynamic   - Encourage verbalization of thoughts and concerns in a socially appropriate manner  - Assess patient/family's coping skills and non-compliant behavior (including use of illegal substances).  - Utilize positive, consistent limit setting strategies supporting safety of patient, staff and others  - Initiate consult with Case Management, Spiritual Care or other ancillary services as appropriate  - If a patient's/visitor's behavior jeopardizes the safety of the patient, staff, or others, refer to organization procedure.   - Notify Security of behavior or suspected illegal substances which indicate the need for search of the patient and/or belongings  - Encourage participation in the decision making process about a behavioral management agreement; implement if patient meets criteria  Outcome: Progressing     Problem: NEUROSENSORY - ADULT  Goal: Achieves stable or improved neurological status  Description: INTERVENTIONS  - Monitor and report changes in neurological status  - Monitor vital signs such as temperature, blood  pressure, glucose, and any other labs ordered   - Initiate measures to prevent increased intracranial pressure  - Monitor for seizure activity and implement precautions if appropriate      Outcome: Progressing  Goal: Achieves maximal functionality and self care  Description: INTERVENTIONS  - Monitor swallowing and airway patency with patient fatigue and changes in neurological status  - Encourage and assist patient to increase activity and self care.   - Encourage visually impaired, hearing impaired and aphasic patients to use assistive/communication devices  Outcome: Progressing     Problem: CARDIOVASCULAR - ADULT  Goal: Maintains optimal cardiac output and hemodynamic stability  Description: INTERVENTIONS:  - Monitor I/O, vital signs and rhythm  - Monitor for S/S and trends of decreased cardiac output  - Administer and titrate ordered vasoactive medications to optimize hemodynamic stability  - Assess quality of pulses, skin color and temperature  - Assess for signs of decreased coronary artery perfusion  - Instruct patient to report change in severity of symptoms  Outcome: Progressing  Goal: Absence of cardiac dysrhythmias or at baseline rhythm  Description: INTERVENTIONS:  - Continuous cardiac monitoring, vital signs, obtain 12 lead EKG if ordered  - Administer antiarrhythmic and heart rate control medications as ordered  - Monitor electrolytes and administer replacement therapy as ordered  Outcome: Progressing     Problem: GASTROINTESTINAL - ADULT  Goal: Minimal or absence of nausea and/or vomiting  Description: INTERVENTIONS:  - Administer IV fluids if ordered to ensure adequate hydration  - Maintain NPO status until nausea and vomiting are resolved  - Nasogastric tube if ordered  - Administer ordered antiemetic medications as needed  - Provide nonpharmacologic comfort measures as appropriate  - Advance diet as tolerated, if ordered  - Consider nutrition services referral to assist patient with adequate  nutrition and appropriate food choices  Outcome: Progressing  Goal: Maintains or returns to baseline bowel function  Description: INTERVENTIONS:  - Assess bowel function  - Encourage oral fluids to ensure adequate hydration  - Administer IV fluids if ordered to ensure adequate hydration  - Administer ordered medications as needed  - Encourage mobilization and activity  - Consider nutritional services referral to assist patient with adequate nutrition and appropriate food choices  Outcome: Progressing  Goal: Maintains adequate nutritional intake  Description: INTERVENTIONS:  - Monitor percentage of each meal consumed  - Identify factors contributing to decreased intake, treat as appropriate  - Assist with meals as needed  - Monitor I&O, weight, and lab values if indicated  - Obtain nutrition services referral as needed  Outcome: Progressing  Goal: Establish and maintain optimal ostomy function  Description: INTERVENTIONS:  - Assess bowel function  - Encourage oral fluids to ensure adequate hydration  - Administer IV fluids if ordered to ensure adequate hydration   - Administer ordered medications as needed  - Encourage mobilization and activity  - Nutrition services referral to assist patient with appropriate food choices  - Assess stoma site  - Consider wound care consult   Outcome: Progressing  Goal: Oral mucous membranes remain intact  Description: INTERVENTIONS  - Assess oral mucosa and hygiene practices  - Implement preventative oral hygiene regimen  - Implement oral medicated treatments as ordered  - Initiate Nutrition services referral as needed  Outcome: Progressing     Problem: GENITOURINARY - ADULT  Goal: Maintains or returns to baseline urinary function  Description: INTERVENTIONS:  - Assess urinary function  - Encourage oral fluids to ensure adequate hydration if ordered  - Administer IV fluids as ordered to ensure adequate hydration  - Administer ordered medications as needed  - Offer frequent  toileting  - Follow urinary retention protocol if ordered  Outcome: Progressing  Goal: Urinary catheter remains patent  Description: INTERVENTIONS:  - Assess patency of urinary catheter  - If patient has a chronic marie, consider changing catheter if non-functioning  - Follow guidelines for intermittent irrigation of non-functioning urinary catheter  Outcome: Progressing     Problem: METABOLIC, FLUID AND ELECTROLYTES - ADULT  Goal: Electrolytes maintained within normal limits  Description: INTERVENTIONS:  - Monitor labs and assess patient for signs and symptoms of electrolyte imbalances  - Administer electrolyte replacement as ordered  - Monitor response to electrolyte replacements, including repeat lab results as appropriate  - Instruct patient on fluid and nutrition as appropriate  Outcome: Progressing  Goal: Fluid balance maintained  Description: INTERVENTIONS:  - Monitor labs   - Monitor I/O and WT  - Instruct patient on fluid and nutrition as appropriate  - Assess for signs & symptoms of volume excess or deficit  Outcome: Progressing  Goal: Glucose maintained within target range  Description: INTERVENTIONS:  - Monitor Blood Glucose as ordered  - Assess for signs and symptoms of hyperglycemia and hypoglycemia  - Administer ordered medications to maintain glucose within target range  - Assess nutritional intake and initiate nutrition service referral as needed  Outcome: Progressing     Problem: HEMATOLOGIC - ADULT  Goal: Maintains hematologic stability  Description: INTERVENTIONS  - Assess for signs and symptoms of bleeding or hemorrhage  - Monitor labs  - Administer supportive blood products/factors as ordered and appropriate  Outcome: Progressing     Problem: MUSCULOSKELETAL - ADULT  Goal: Maintain or return mobility to safest level of function  Description: INTERVENTIONS:  - Assess patient's ability to carry out ADLs; assess patient's baseline for ADL function and identify physical deficits which impact  ability to perform ADLs (bathing, care of mouth/teeth, toileting, grooming, dressing, etc.)  - Assess/evaluate cause of self-care deficits   - Assess range of motion  - Assess patient's mobility  - Assess patient's need for assistive devices and provide as appropriate  - Encourage maximum independence but intervene and supervise when necessary  - Involve family in performance of ADLs  - Assess for home care needs following discharge   - Consider OT consult to assist with ADL evaluation and planning for discharge  - Provide patient education as appropriate  Outcome: Progressing

## 2024-03-12 NOTE — RESPIRATORY THERAPY NOTE
03/12/24 1215   Respiratory Assessment   Resp Comments Bedside Bronchoscopy and Percutaneous Tracheostomy done at bedside by Dr. Escobar, Dr. Castellon. Pt on 100% pre and during procedure. Pt tolerated well.

## 2024-03-12 NOTE — ASSESSMENT & PLAN NOTE
Code Status: full - Level 1  Min remains disease directed with only limit of no prolonged nursing home stay. He understands that best case scenario she will need months of recovery.

## 2024-03-12 NOTE — PROGRESS NOTES
Spiritual Care Progress Note    3/12/2024  Patient: Carla Hunt : 1965  Admission Date & Time: 1/10/2024 1941  MRN: [id number] [facility]: [id number]      Attempted follow-up but patient intubated/sedated and no family present at this time.     Chaplains still remain available.       24 1500   Clinical Encounter Type   Visited With Patient not available

## 2024-03-12 NOTE — PROGRESS NOTES
[facility]  Progress Note  Name: Carla Hunt I  MRN: [id number]  Unit/Bed#: MICU 12 I Date of Admission: 1/10/2024   Date of Service: 3/14/2024 I Hospital Day: 64    Assessment/Plan   Goals of care, counseling/discussion  Assessment & Plan  Code Status: full - Level 1  Min remains disease directed with only limit of no prolonged nursing home stay. He understands that best case scenario she will need months of recovery.     Palliative care patient  Assessment & Plan  PSC, including MSW support, will follow closely to continue to provide supportive care as clinical situation evolves.   Pastoral care consulted  Decisional apparatus:  Patient is not competent on my exam today.  If competence is lost, patient's substitute decision maker would default to spouse by [place].  Advance Directive / Living Will / POLST:  None on file, unable to complete    Teto marginal zone B-cell lymphoma (HCC)  Assessment & Plan  Previously on maintenance Rituxan therapy. Last treatment in December 2023    * Acute respiratory failure with hypoxia (HCC)  Assessment & Plan  Patient was re-intubated 3/11 with subsequent bedside trach on 3/12.   Very careful steroid management per critical care team.        Remainder of care per primary (critical care) team.     Interval history:       Patient unfortunately developed dehiscence of abdominal incision for which VAC was placed by surgery. Also tachypneic overnight requiring adjustment of vent, sedation. CTH completed yesterday due to poor responsiveness when sedation held but no contributory findings. This morning more alert and responsive to spouse and working with PT. During time of encounter spouse had gone home. Called to offer support. LMOM with non-urgent message and callback number.     MEDICATIONS / ALLERGIES:     all current active meds have been reviewed    No Known Allergies    OBJECTIVE:    Physical Exam  Physical  Exam  Constitutional:       General: She is not in acute distress.     Appearance: She is ill-appearing.   HENT:      Head: Atraumatic.   Cardiovascular:      Rate and Rhythm: Normal rate.   Pulmonary:      Comments: Ventilated via trach  Abdominal:      Comments: NGT in place   Skin:     General: Skin is dry.   Neurological:      Comments: Resting during time of encounter. Was interactive with PT earlier.   Psychiatric:      Comments: Calm, no agitation         Lab Results:   Results from last 7 days   Lab Units 03/14/24  0542 03/13/24  0449 03/13/24  0210 03/12/24  0758 03/10/24  0513 03/09/24  0554   WBC Thousand/uL 10.46* 8.87 8.01 9.71   < > 21.99*  21.99*   HEMOGLOBIN g/dL 7.4* 7.4* 7.7* 7.7*   < > 8.7*  8.7*   HEMATOCRIT % 22.5* 22.1* 23.1* 23.6*   < > 27.8*  27.8*   PLATELETS Thousands/uL 191 163 159 179   < > 419*  419*   MONO PCT %  --   --   --  2*  --  1*   EOS PCT %  --   --   --  0  --  0    < > = values in this interval not displayed.     Results from last 7 days   Lab Units 03/14/24  0542 03/13/24  0449 03/12/24  1233 03/10/24  1806 03/10/24  0513   POTASSIUM mmol/L 3.2* 4.0 5.0   < > 4.0   CHLORIDE mmol/L 112* 114* 113*   < > 112*   CO2 mmol/L 19* 19* 23   < > 24   BUN mg/dL 59* 53* 44*   < > 38*   CREATININE mg/dL 1.55* 1.28 0.96   < > 0.59*   CALCIUM mg/dL 7.7* 7.7* 7.5*   < > 8.7   ALK PHOS U/L  --   --   --   --  90   ALT U/L  --   --   --   --  31   AST U/L  --   --   --   --  18    < > = values in this interval not displayed.     Imaging Studies: reviewed pertinent studies   EKG, Pathology, and Other Studies: reviewed pertinent studies    Counseling / Coordination of Care    Total floor / unit time spent today 15 minutes. Greater than 50% of total time was spent with the patient and / or family counseling and / or coordination of care. A description of the counseling / coordination of care: time spent assessing patient, communicating with RN, attempt to call spouse via phone.    This note  was not shared with the patient due to privacy exception: note includes other individuals

## 2024-03-12 NOTE — ASSESSMENT & PLAN NOTE
Patient was re-intubated 3/11 with subsequent bedside trach on 3/12.   Very careful steroid management per critical care team.

## 2024-03-12 NOTE — PROCEDURES
Percutaneous Tracheostomy under Bronchoscopic Guidance    Date/Time: 3/12/2024 11:30 AM    Performed by: Ya Castellon DO  Authorized by: Ya Castellon DO    Patient location:  Bedside  Assisting Provider(s): Yes (comment) (Dr Decker)    Consent:     Consent obtained:  Written    Consent given by:  Spouse    Risks discussed:  Bleeding, infection and poor cosmetic result (Damage to trachea and surrounding tissues, Hypoxia, tracheal stenosis)    Alternatives discussed:  No treatment  Indications:     Indications:  Recurrent respiratory failure  Sedation:     Sedation type: Patient was on propofol and fentanyl infusions at start of procedure - increased propofol to 50 mcg/kg/min and fentanyl to 100mcg/hr. An additional 200mcg fentanyl and 4 mg versed were given to attain adequate sedation. Paralyzed with 10 mg vecuronium.  Pre-procedure details:     Skin preparation:  ChloraPrep    Preparation: Patient was prepped and draped in the usual sterile fashion    Anesthesia (see MAR for exact dosages):     Anesthesia method:  Local infiltration    Local anesthetic:  Lidocaine 1% WITH epi and lidocaine 2% WITH epi (5cc to anterior neck)  Procedure Detail:     Equipment used:  8.0 Blue rhino kit    Procedure note (site, laterality, method, findings):    Bronchoscopy performed by: Dr Nigel Escobar and Dr Cody Martins (see separate note)    Time out was called prior to the procedure    Procedure:  The patient was prepped and draped in sterile fashion after being positioned with hyperextended neck.  Lidocaine was instilled subcutaneously above the 1-3 tracheal rings.  A vertical incision was made from the cricoid membrane 2cm inferiorly.  Blunt dissection was then performed.    The bronchoscope and ETT were then retracted til they were above the second tracheal ring.  A needle was used to lozada the second tracheal space and the catheter was placed over the needle.  The guidewire was advanced via the catheter and visualized in  the trachea.  Punch dilator was passed over the guidewire followed by Blue Rhino serial dilator to the skin incision line.  #8.0 Shiley trach that was loaded onto a 28 Fr dilator and was passed over the guidewire.  Guidewires and dilators were removed.  The bronchoscope was then placed in the the trach tube and confirmed appropriate placement.  The vent was hooked up to the trach tube and good ventilation was observed.    The tube was sutured in place and then secured with a trach tie.      Post-procedure details:     Patient tolerance of procedure:  Tolerated well, no immediate complications  Comments:      I updated the patient's  and son after the procedure

## 2024-03-12 NOTE — CASE MANAGEMENT
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) 2024    Abnormal CT of the head 2024    Nephrolithiasis 2021    Other specified anxiety disorders 2019    Reactive depression 2019    Hidradenitis suppurativa of left axilla 2019    Anxiety state 2018    Disorder of parathyroid gland ([facility]) 2018    Eczema 2018    Grand mal status ([facility]) 2018    Hypercalcemia 2018    Hypertensive disorder 2018    Open wound of hand except fingers 2018    Otitis externa 2018    Overweight 2018    Pain in face 2018    Skin sensation disturbance 2018    Subjective visual disturbance 2018    Long term current use of hormonal contraceptive 10/23/2018    Rosacea 2015      LOS (days): 62  Geometric Mean LOS (GMLOS) (days):   Days to GMLOS:     OBJECTIVE:  Risk of Unplanned Readmission Score: 35.34         Current admission status: Inpatient   Preferred Pharmacy:   [id number] [place] - [address]  [place] [address]  Phone: [phone] Fax: [phone]    [facility] - [place], [place] - [address]  [address] [facility] [address]  Phone: [phone] Fax:  [phone]    Primary Care Provider: Tony Guevara MD    Primary Insurance: MEDICARE  Secondary Insurance: INTER GROUP    DISCHARGE DETAILS:       Additional Comments: It was determined during multidisciplinary rounds a bedside trach was placed on 3/12/2024 and patient continues to use a vent.  Palliative care is still involved for GOC.  CM to be available

## 2024-03-12 NOTE — PROGRESS NOTES
Progress Note - Infectious Disease   Carla Hunt 59 y.o. female MRN: [id number]  [facility]: [id number]      Impression/Plan:    1. Acute hypoxic respiratory failure, likely multifactorial, with both COVID and bacterial pneumonia contributing.  Also consider persistent inflammation, fibrosis as seems quite steroid responsive in past.  Most recently extubated 3/1.  Patient with worsening respiratory status requiring intubation again 3/11. Donsider progressive bacterial infection given increased secretions, although has been on antibiotics and has no fever, leukocytosis, or focal infiltrates on recent CT.  Consider recrudescence of inflammation in setting of steroid taper as this has been observed previously and CRP now rising.  Still has low-level COVID shedding although suspect this is not primarily driving worsening hypoxia.  Status post bronchoscopy today with excessive secretions seen from the lower lobes bilaterally.  -Continue antivirals for now  -Continue IV cefepime and vancomycin, dosing by pharmacy  -Stop IV metronidazole is low concern for anaerobic process  -Trial of increased steroids  -Follow up BAL cultures  -Trend CRP  -If clinically deteriorates with concern for progressive sepsis would change cefepime to meropenem 1g IV Q8     2. SIRS versus sepsis.  Fluctuating fever, WBC.  Fevers may be multifactorial due to COVID (still positive antigen and PCR) versus inflammation (seem to correlate to steroid dosing).  Also consider developing bacterial infection.  Recent blood cultures 2/26 negative.  CT C/A/P 3/10 shows stable groundglass and nodular opacities, inflammation around stoma.  No evidence of infection around the stoma at this time  -Continue current antivirals  -antibiotics as above  -Follow temperatures closely  -Recheck WBC in AM to monitor infection  -Supportive care as per the primary service     3. Recurrent bacterial pneumonia.  The patient has completed multiple  treatment courses over the hospitalization.  Most recent BAL culture with Pseudomonas and stenotrophomonas.  Unclear if pathogens or colonizers.  Status post 10 days levofloxacin.  CT C/A/P showed evolving changes likely all due to sequelae of COVID, infections.  Noted to have worsening hypoxia and purulent secretions on bronchoscopy but has been on broad-spectrum antibiotics              -continue vancomycin, cefepime, for now              -follow up repeat BALculture              -Wean O2 as able     4. Severe COVID, present on admission.  Patient was treated with a 10-day course of remdesivir, dexamethasone and was given 1 dose of Tocilizumab on admission.  COVID antigen was negative prior to coming off isolation.  Had positive COVID PCR from BAL 2/16, status post another 5-day course of remdesivir.  Patient has remained on high-dose systemic corticosteroid throughout her hospitalization which were recently intensified 2/26 for fevers.  Patient having persistent fevers, hypoxia.  COVID antigen positive and PCR is also positive 3/3 and Ct 26.8, consistent with high viral replication.  Repeat PCR positive with Ct now closer to 30.  -Continue remdesivir/Paxlovid to complete a 14-day total course through 3/15/2024  -Increase steroids as above     5. 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  Patient is status post exploratory laparotomy with ileocecectomy and end ileostomy creation 2/20.   End ileostomy is with ongoing ischemic changes which is likely contributing to the imaging findings and ongoing SIRS response.  No evidence of cellulitis at this time  -Serial exams  -Surgery follow-up     B-cell lymphoma, on maintenance rituxan, which is currently postponed.  Rituximab is a risk factor for persistent COVID infection.    Above management plan to stop Flagyl, continue other antibiotics/antivirals discussed with Dr. Lazcano critical care resident. ID will follow.    Antibiotics:  Day 11  Paxlovid/remdesivir  Day 9 vancomycin/Cefepime  Day 5 flagyl     Subjective:  Patient is status post trach before my visit, still sedated. She is afebrile, WBC count normalized.     Objective:  Vitals:  Temp:  [96.7 °F (35.9 °C)-98.9 °F (37.2 °C)] 98.9 °F (37.2 °C)  HR:  [] 101  Resp:  [22-39] 28  BP: ()/(52-77) 117/54  SpO2:  [92 %-100 %] 95 %  Temp (24hrs), Av.6 °F (36.4 °C), Min:96.7 °F (35.9 °C), Max:98.9 °F (37.2 °C)  Current: Temperature: 98.9 °F (37.2 °C)    Physical Exam:   General Appearance:  Ill-appearing, sedated   Neck: Trach in place   Lungs:   Rhonchi bilaterally   Heart:  RRR; no murmur, rub or gallop   Abdomen:   Ostomy present with brown liquid stool. Incision C/D/I   Extremities: No edema   Skin: No new rashes or lesions.        Labs:   All pertinent labs and imaging studies were personally reviewed  Results from last 7 days   Lab Units 24  0758 24  0446 24  0401   WBC Thousand/uL 9.71 11.99* 11.46*   HEMOGLOBIN g/dL 7.7* 6.0* 5.9*   PLATELETS Thousands/uL 179 205 207     Results from last 7 days   Lab Units 24  1233 24  0401 24  0415 03/10/24  1806 03/10/24  0513   SODIUM mmol/L 143 145 147   < > 148*   POTASSIUM mmol/L 5.0 3.0* 4.1   < > 4.0   CHLORIDE mmol/L 113* 111* 115*   < > 112*   CO2 mmol/L 23 23 21   < > 24   BUN mg/dL 44* 41* 39*   < > 38*   CREATININE mg/dL 0.96 0.90 0.69   < > 0.59*   EGFR ml/min/1.73sq m 65 70 96   < > 101   CALCIUM mg/dL 7.5* 7.9* 8.3*   < > 8.7   AST U/L  --   --   --   --  18   ALT U/L  --   --   --   --  31   ALK PHOS U/L  --   --   --   --  90    < > = values in this interval not displayed.     Results from last 7 days   Lab Units 03/10/24  0513   PROCALCITONIN ng/ml 1.08*     Results from last 7 days   Lab Units 24  0401 24  0415 03/10/24  0513 24  0554 24  0541 24  0435 24  0441   CRP mg/L 106.3* 166.1* 112.1* 14.8* 36.9* 109.6* 172.3*           Results from last 7 days    Lab Units 03/12/24  0446   D-DIMER QUANTITATIVE ug/ml FEU 0.54*         Imaging:  CT chest, abdomen, pelvis personally reviewed and shows persistent bilateral groundglass nodular consolidation

## 2024-03-12 NOTE — UTILIZATION REVIEW
"Continued Stay Review    Date: 03/12                          Current Patient Class: IP  Current Level of Care: Level 2 stepdown/HOT    HPI:59 y.o. female initially admitted on 01/10     Assessment/Plan: Pt remains intubated, on AC/VC switched to AC/PC later due to persistent hypercapnia. HD stable, had bronch done with bilateral LL mucoid/purulent secretions. Hb dropped to 5.9 required 1 unit of PRBCS.  Cont MV support, cont sedation. On levo, MAP goal >65. On cefepime and flagyl, if her sepsis becomes overwhelmingly worse consideration to switch cefepime and Flagyl to meropenem, bronchial stain 3+ GNR, plan for perc trach today at bedside. NGT. Restart TF after trach today. Mon relan function, UOP. Mon H^H, transfuse hgb<7. Cont insulin gtt. Cont IV steroids q6h. Cont Remdesivir, paxlovid.    Vital Signs: /54   Pulse 100   Temp 98.9 °F (37.2 °C) (Axillary)   Resp (!) 28   Ht 5' 8\" (1.727 m)   Wt 70.4 kg (155 lb 3.3 oz)   SpO2 93%   BMI 23.60 kg/m²       Pertinent Labs/Diagnostic Results:   Results from last 7 days   Lab Units 03/11/24  0417   SARS-COV-2  Positive*     Results from last 7 days   Lab Units 03/12/24  0758 03/12/24  0446 03/12/24  0401 03/11/24  0415 03/10/24  0513 03/09/24  0554   WBC Thousand/uL 9.71 11.99* 11.46* 12.13* 13.35* 21.99*  21.99*   HEMOGLOBIN g/dL 7.7* 6.0* 5.9* 7.2* 8.6* 8.7*  8.7*   HEMATOCRIT % 23.6* 19.0* 19.4* 23.3* 26.9* 27.8*  27.8*   PLATELETS Thousands/uL 179 205 207 283 400* 419*  419*   BANDS PCT % 14*  --   --   --   --  2     Results from last 7 days   Lab Units 03/12/24  0401   RETIC CT ABS  94,200   RETIC CT PCT % 5.04*     Results from last 7 days   Lab Units 03/12/24  1233 03/12/24  0401 03/11/24  0415 03/11/24  0028 03/10/24  1806 03/10/24  0513 03/09/24  1358 03/09/24  0554 03/08/24  1427 03/07/24  1641 03/07/24  0435 03/06/24  1419   SODIUM mmol/L 143 145 147 149* 147 148*   < > 145 144   < > 150*  --    POTASSIUM mmol/L 5.0 3.0* 4.1 4.3 2.8* 4.0   < " "> 3.8 3.4*   < > 2.7*  --    CHLORIDE mmol/L 113* 111* 115* 116* 114* 112*   < > 110* 107   < > 112*  --    CO2 mmol/L 23 23 21 22 20* 24   < > 24 27   < > 27  --    ANION GAP mmol/L 7 11 11 11 13 12   < > 11 10   < > 11  --    BUN mg/dL 44* 41* 39* 37* 39* 38*   < > 29* 29*   < > 34*  --    CREATININE mg/dL 0.96 0.90 0.69 0.65 0.64 0.59*   < > 0.52* 0.48*   < > 0.48*  --    EGFR ml/min/1.73sq m 65 70 96 98 98 101   < > 105 108   < > 108  --    CALCIUM mg/dL 7.5* 7.9* 8.3* 8.3* 8.0* 8.7   < > 8.4 8.3*   < > 8.6  --    CALCIUM, IONIZED mmol/L  --   --   --   --   --   --   --   --   --   --   --  1.29   MAGNESIUM mg/dL  --  2.1 2.2  --   --  2.2  --  2.2 2.1   < > 2.2  --    PHOSPHORUS mg/dL  --   --   --   --   --   --   --   --   --   --  2.5*  --     < > = values in this interval not displayed.     Results from last 7 days   Lab Units 03/10/24  0513   AST U/L 18   ALT U/L 31   ALK PHOS U/L 90   TOTAL PROTEIN g/dL 5.5*   ALBUMIN g/dL 2.9*   TOTAL BILIRUBIN mg/dL 0.59   BILIRUBIN DIRECT mg/dL 0.22*     Results from last 7 days   Lab Units 03/12/24  1556 03/12/24  1407 03/12/24  1218 03/12/24  1010 03/12/24  0757 03/12/24  0604 03/12/24  0357 03/12/24  0201 03/12/24  0003 03/11/24  2204 03/11/24  1955 03/11/24  1819   POC GLUCOSE mg/dl 143* 109 126 172* 156* 156* 143* 191* 236* 168* 137 106     Results from last 7 days   Lab Units 03/12/24  1233 03/12/24  0401 03/11/24  0415 03/11/24  0028 03/10/24  1806 03/10/24  0513 03/09/24  2225 03/09/24  1358 03/09/24  0554 03/08/24  1427 03/08/24  0541 03/07/24  1641   GLUCOSE RANDOM mg/dL 106 128 238* 116 215* 176* 187* 143* 151* 80 191* 329*             No results found for: \"BETA-HYDROXYBUTYRATE\"   Results from last 7 days   Lab Units 03/12/24  0832 03/12/24  0757 03/12/24  0418 03/11/24  2220 03/11/24  1859 03/10/24  0803 03/06/24  1227   PH ART  7.330* 7.338* 7.248*   < > 7.188*   < > 7.483*   PCO2 ART mm Hg 40.7 40.5 54.0*   < > 55.6*   < > 33.4*   PO2 ART mm Hg 115.3 " 132.6* 142.2*   < > 112.5   < > 46.4*   HCO3 ART mmol/L 21.0* 21.3* 23.0   < > 20.7*   < > 24.5   BASE EXC ART mmol/L -4.6 -4.2 -3.9   < > -7.1   < > 1.3   O2 CONTENT ART mL/dL 12.0* 11.7* 8.6*   < > 9.6*   < > 11.7*   O2 HGB, ARTERIAL % 97.1* 97.5* 97.5*   < > 96.4   < > 86.3*   ABG SOURCE  Line, Arterial Line, Arterial  --   --  Line, Arterial   < > Radial, Right   NON VENT TYPE HFNC   --   --   --   --   --   --  HFNC Flow   HFNC FLOW LPM   --   --   --   --   --   --  40    < > = values in this interval not displayed.     Results from last 7 days   Lab Units 03/10/24  0534   PH NICA  7.391   PCO2 NICA mm Hg 39.2*   PO2 NICA mm Hg 40.3   HCO3 NICA mmol/L 23.3*   BASE EXC NICA mmol/L -1.5   O2 CONTENT NICA ml/dL 9.9   O2 HGB, VENOUS % 73.3             Results from last 7 days   Lab Units 03/09/24  1358   HS TNI 0HR ng/L 60*     Results from last 7 days   Lab Units 03/12/24  0446   D-DIMER QUANTITATIVE ug/ml FEU 0.54*     Results from last 7 days   Lab Units 03/12/24  0446 03/12/24  0431   PROTIME seconds  --  16.8*   INR   --  1.39*   PTT seconds 33  --          Results from last 7 days   Lab Units 03/10/24  0513   PROCALCITONIN ng/ml 1.08*     Results from last 7 days   Lab Units 03/10/24  1618 03/10/24  0851 03/10/24  0513   LACTIC ACID mmol/L 1.4 3.3* 3.5*                         Results from last 7 days   Lab Units 03/12/24  0644 03/12/24  0542 03/08/24  0801 03/07/24  0555   UNIT PRODUCT CODE  [id number]  [id number]   UNIT NUMBER  [id number]  [id number]   UNITABO  O A A  A A   UNITRH  NEG NEG NEG  NEG NEG   CROSSMATCH  Compatible Compatible Compatible  Compatible Compatible   UNIT DISPENSE STATUS  Issued Issued Return to Inv  Return to Inv Presumed Trans   UNIT PRODUCT VOL mL 350 350 350  350 350             Results from last 7 days   Lab Units 03/12/24  0401 03/11/24  0415 03/10/24  0513 03/09/24  0554 03/08/24  0541   CRP mg/L  106.3* 166.1* 112.1* 14.8* 36.9*                                             Results from last 7 days   Lab Units 03/11/24  1306   GRAM STAIN RESULT  3+ Polys*  3+ Gram variable rods*  1+ Budding Yeast with Pseudomycelia*             Results from last 7 days   Lab Units 03/12/24  0441 03/11/24  0557 03/06/24  0441   VANCOMYCIN RM ug/mL 31.0* 26.7* 29.1*       Medications:   Scheduled Medications:  cefepime, 2,000 mg, Intravenous, Q8H  chlorhexidine, 15 mL, Mouth/Throat, Q12H SARTHAK  famotidine, 20 mg, Per NG Tube, BID  ipratropium, 0.5 mg, Nebulization, TID  lamoTRIgine, 150 mg, Oral, BID  levalbuterol, 1.25 mg, Nebulization, TID  methylPREDNISolone sodium succinate, 60 mg, Intravenous, Q6H SARTHAK  nirmatrelvir & ritonavir, 3 tablet, Oral, BID  nystatin, , Topical, BID  polyethylene glycol, 17 g, Per NG Tube, Daily  propofol, 100 mg, Intravenous, Once  remdesivir, 100 mg, Intravenous, Q24H  sodium chloride, 2 spray, Each Nare, Q4H  topiramate, 100 mg, Per PEG Tube, BID  vancomycin (VANCOCIN) IVPB (premix in dextrose) 500 mg 100 mL q12h IV   venlafaxine, 25 mg, Oral, Daily      Continuous IV Infusions:  fentaNYL, 50 mcg/hr, Intravenous, Continuous  insulin regular (HumuLIN R,NovoLIN R) 1 Units/mL in sodium chloride 0.9 % 100 mL infusion, 0.3-21 Units/hr, Intravenous, Titrated  norepinephrine, 1-30 mcg/min, Intravenous, Titrated  propofol, 5-50 mcg/kg/min, Intravenous, Titrated  vasopressin, 0.04 Units/min, Intravenous, Continuous      PRN Meds:  acetaminophen, 650 mg, Oral, Q6H PRN  OLANZapine, 10 mg, Oral, HS PRN  ondansetron, 4 mg, Intravenous, Q6H PRN  oxyCODONE, 2.5 mg, Oral, Q4H PRN   Or  oxyCODONE, 5 mg, Oral, Q4H PRN  sodium chloride, 1 Application, Nasal, Q1H PRN  vancomycin, 500 mg, Intravenous, Daily PRN        Discharge Plan: [facility]    Network Utilization Review Department  ATTENTION: Please call with any questions or concerns to [phone] and carefully listen to the prompts so that you are directed to the  right person. All voicemails are confidential.   For Discharge needs, contact Care Management DC Support Team at [phone] opt. [phone]  Send all requests for admission clinical reviews, approved or denied determinations and any other requests to dedicated fax number below belonging to the campus where the patient is receiving treatment. List of dedicated fax numbers for the Facilities:  FACILITY NAME UR FAX NUMBER   ADMISSION DENIALS (Administrative/Medical Necessity) [phone]   DISCHARGE SUPPORT TEAM (NETWORK) [phone]   PARENT CHILD HEALTH (Maternity/NICU/Pediatrics) [phone]   [facility] [phone]   [facility] [phone]   [facility] [phone]   [facility] [phone]   [facility] [phone]   [facility] [phone]   [facility] [phone]   [facility] [phone]   [facility] [phone]   [facility] [phone]   [facility] [phone]   [facility] [phone]

## 2024-03-12 NOTE — PROGRESS NOTES
Vancomycin IV Pharmacy-to-Dose Consultation    Carla Hunt is a 59 y.o. female who is currently receiving Vancomycin IV with management by the Pharmacy Consult service.    Relevant clinical data and objective / subjective history reviewed.      Vancomycin Assessment:  Indication: Other Ostomy infection  Status: critically ill  Micro:   - 3/11 Bronchial culture: GVR + Pseudomycelia  - 3/11 COVID: (+)  Procalcitonin: 1.08 on 3/10  Renal Function:     Lab Results   Component Value Date    CREATININE 0.96 03/12/2024     Estimated Creatinine Clearance: 64.4 mL/min (by C-G formula based on SCr of 0.96 mg/dL).  Dialysis: no  Days of Therapy: 9 (1 day gap between discontinuation of previous regimen and initiation of current regimen.)  Current Dose: 750 mg IV q12h   Goal AUC / Trough: -600, trough >10   Last Level: 31.0 on 3/12  Model Fit:  intermediate  Assessment: Renal function worsening, vancomycin changed to PRN dosing. ID following.      Vancomycin Plan:  New Dosing: change to 500 mg IV q24h as needed for level less than 15   Predicted Trough / AUC: N/A  Next Level: on 3/13 at 0600  Renal Function Monitoring: Daily BMP and UOP      Pharmacy will continue to follow closely for s/sx of nephrotoxicity, infusion reactions and appropriateness of therapy.  BMP and CBC will be ordered per protocol. We will continue to follow the patient’s culture results and clinical progress daily.       Gabe Henriquez, PharmD, BCCCP  Critical Care Clinical Pharmacist  Available via [employer]

## 2024-03-12 NOTE — SOCIAL WORK
The Palliative SW met with the pt and family.  There has been a decision to trach the pt.  The procedure will be done at bedside. The pt  and son will be in the waiting area.

## 2024-03-13 PROBLEM — G93.40 ENCEPHALOPATHY: Status: ACTIVE | Noted: 2020-04-24

## 2024-03-13 PROBLEM — G93.40 ENCEPHALOPATHY: Status: RESOLVED | Noted: 2020-04-24 | Resolved: 2024-01-19

## 2024-03-13 LAB
ABO GROUP BLD BPU: NORMAL
ABO GROUP BLD BPU: NORMAL
ANION GAP SERPL CALCULATED.3IONS-SCNC: 11 MMOL/L (ref 4–13)
BASE EXCESS BLDA CALC-SCNC: -6.5 MMOL/L
BPU ID: NORMAL
BPU ID: NORMAL
BUN SERPL-MCNC: 53 MG/DL (ref 5–25)
CA-I BLD-SCNC: 1.12 MMOL/L (ref 1.12–1.32)
CALCIUM SERPL-MCNC: 7.7 MG/DL (ref 8.4–10.2)
CHLORIDE SERPL-SCNC: 114 MMOL/L (ref 96–108)
CO2 SERPL-SCNC: 19 MMOL/L (ref 21–32)
CREAT SERPL-MCNC: 1.28 MG/DL (ref 0.6–1.3)
CROSSMATCH: NORMAL
CROSSMATCH: NORMAL
CRP SERPL QL: 73.6 MG/L
ERYTHROCYTE [DISTWIDTH] IN BLOOD BY AUTOMATED COUNT: 21.9 % (ref 11.6–15.1)
ERYTHROCYTE [DISTWIDTH] IN BLOOD BY AUTOMATED COUNT: 21.9 % (ref 11.6–15.1)
GFR SERPL CREATININE-BSD FRML MDRD: 46 ML/MIN/1.73SQ M
GLUCOSE SERPL-MCNC: 111 MG/DL (ref 65–140)
GLUCOSE SERPL-MCNC: 119 MG/DL (ref 65–140)
GLUCOSE SERPL-MCNC: 123 MG/DL (ref 65–140)
GLUCOSE SERPL-MCNC: 130 MG/DL (ref 65–140)
GLUCOSE SERPL-MCNC: 132 MG/DL (ref 65–140)
GLUCOSE SERPL-MCNC: 133 MG/DL (ref 65–140)
GLUCOSE SERPL-MCNC: 133 MG/DL (ref 65–140)
GLUCOSE SERPL-MCNC: 142 MG/DL (ref 65–140)
GLUCOSE SERPL-MCNC: 164 MG/DL (ref 65–140)
GLUCOSE SERPL-MCNC: 174 MG/DL (ref 65–140)
GLUCOSE SERPL-MCNC: 180 MG/DL (ref 65–140)
GLUCOSE SERPL-MCNC: 185 MG/DL (ref 65–140)
HCO3 BLDA-SCNC: 19 MMOL/L (ref 22–28)
HCT VFR BLD AUTO: 22.1 % (ref 34.8–46.1)
HCT VFR BLD AUTO: 23.1 % (ref 34.8–46.1)
HGB BLD-MCNC: 7.4 G/DL (ref 11.5–15.4)
HGB BLD-MCNC: 7.7 G/DL (ref 11.5–15.4)
HOROWITZ INDEX BLDA+IHG-RTO: 40 MM[HG]
MAGNESIUM SERPL-MCNC: 2 MG/DL (ref 1.9–2.7)
MCH RBC QN AUTO: 29.8 PG (ref 26.8–34.3)
MCH RBC QN AUTO: 30.3 PG (ref 26.8–34.3)
MCHC RBC AUTO-ENTMCNC: 33.3 G/DL (ref 31.4–37.4)
MCHC RBC AUTO-ENTMCNC: 33.5 G/DL (ref 31.4–37.4)
MCV RBC AUTO: 89 FL (ref 82–98)
MCV RBC AUTO: 91 FL (ref 82–98)
O2 CT BLDA-SCNC: 10.9 ML/DL (ref 16–23)
OXYHGB MFR BLDA: 94.5 % (ref 94–97)
PCO2 BLDA: 37.8 MM HG (ref 36–44)
PEEP RESPIRATORY: 6 CM[H2O]
PH BLDA: 7.32 [PH] (ref 7.35–7.45)
PHOSPHATE SERPL-MCNC: 3.9 MG/DL (ref 2.7–4.5)
PLATELET # BLD AUTO: 159 THOUSANDS/UL (ref 149–390)
PLATELET # BLD AUTO: 163 THOUSANDS/UL (ref 149–390)
PMV BLD AUTO: 12 FL (ref 8.9–12.7)
PMV BLD AUTO: 12.4 FL (ref 8.9–12.7)
PO2 BLDA: 83.4 MM HG (ref 75–129)
POTASSIUM SERPL-SCNC: 4 MMOL/L (ref 3.5–5.3)
PRESSURE CONTROL: 16
RBC # BLD AUTO: 2.48 MILLION/UL (ref 3.81–5.12)
RBC # BLD AUTO: 2.54 MILLION/UL (ref 3.81–5.12)
SODIUM SERPL-SCNC: 144 MMOL/L (ref 135–147)
SPECIMEN SOURCE: ABNORMAL
TRIGL SERPL-MCNC: 331 MG/DL
UNIT DISPENSE STATUS: NORMAL
UNIT DISPENSE STATUS: NORMAL
UNIT PRODUCT CODE: NORMAL
UNIT PRODUCT CODE: NORMAL
UNIT PRODUCT VOLUME: 350 ML
UNIT PRODUCT VOLUME: 350 ML
UNIT RH: NORMAL
UNIT RH: NORMAL
VANCOMYCIN SERPL-MCNC: 27.9 UG/ML (ref 10–20)
VENT AC: 28
VENT- AC: AC
WBC # BLD AUTO: 8.01 THOUSAND/UL (ref 4.31–10.16)
WBC # BLD AUTO: 8.87 THOUSAND/UL (ref 4.31–10.16)

## 2024-03-13 PROCEDURE — 94760 N-INVAS EAR/PLS OXIMETRY 1: CPT

## 2024-03-13 PROCEDURE — 99233 SBSQ HOSP IP/OBS HIGH 50: CPT | Performed by: STUDENT IN AN ORGANIZED HEALTH CARE EDUCATION/TRAINING PROGRAM

## 2024-03-13 PROCEDURE — 85027 COMPLETE CBC AUTOMATED: CPT | Performed by: STUDENT IN AN ORGANIZED HEALTH CARE EDUCATION/TRAINING PROGRAM

## 2024-03-13 PROCEDURE — 83735 ASSAY OF MAGNESIUM: CPT | Performed by: STUDENT IN AN ORGANIZED HEALTH CARE EDUCATION/TRAINING PROGRAM

## 2024-03-13 PROCEDURE — 99233 SBSQ HOSP IP/OBS HIGH 50: CPT | Performed by: PSYCHIATRY & NEUROLOGY

## 2024-03-13 PROCEDURE — 82948 REAGENT STRIP/BLOOD GLUCOSE: CPT

## 2024-03-13 PROCEDURE — 84478 ASSAY OF TRIGLYCERIDES: CPT

## 2024-03-13 PROCEDURE — 80202 ASSAY OF VANCOMYCIN: CPT | Performed by: INTERNAL MEDICINE

## 2024-03-13 PROCEDURE — 99291 CRITICAL CARE FIRST HOUR: CPT | Performed by: INTERNAL MEDICINE

## 2024-03-13 PROCEDURE — 97605 NEG PRS WND THER DME<=50SQCM: CPT | Performed by: SURGERY

## 2024-03-13 PROCEDURE — 94640 AIRWAY INHALATION TREATMENT: CPT

## 2024-03-13 PROCEDURE — 84100 ASSAY OF PHOSPHORUS: CPT

## 2024-03-13 PROCEDURE — 94664 DEMO&/EVAL PT USE INHALER: CPT

## 2024-03-13 PROCEDURE — 86140 C-REACTIVE PROTEIN: CPT | Performed by: STUDENT IN AN ORGANIZED HEALTH CARE EDUCATION/TRAINING PROGRAM

## 2024-03-13 PROCEDURE — 2W13X6Z COMPRESSION OF ABDOMINAL WALL USING PRESSURE DRESSING: ICD-10-PCS | Performed by: SURGERY

## 2024-03-13 PROCEDURE — 80048 BASIC METABOLIC PNL TOTAL CA: CPT | Performed by: STUDENT IN AN ORGANIZED HEALTH CARE EDUCATION/TRAINING PROGRAM

## 2024-03-13 PROCEDURE — 99024 POSTOP FOLLOW-UP VISIT: CPT | Performed by: SURGERY

## 2024-03-13 PROCEDURE — 82330 ASSAY OF CALCIUM: CPT

## 2024-03-13 RX ORDER — METHYLPREDNISOLONE SODIUM SUCCINATE 125 MG/2ML
50 INJECTION, POWDER, LYOPHILIZED, FOR SOLUTION INTRAMUSCULAR; INTRAVENOUS EVERY 6 HOURS SCHEDULED
Status: DISCONTINUED | OUTPATIENT
Start: 2024-03-14 | End: 2024-03-15

## 2024-03-13 RX ORDER — ENOXAPARIN SODIUM 100 MG/ML
40 INJECTION SUBCUTANEOUS
Status: DISCONTINUED | OUTPATIENT
Start: 2024-03-13 | End: 2024-03-15

## 2024-03-13 RX ORDER — METHYLPREDNISOLONE SODIUM SUCCINATE 125 MG/2ML
60 INJECTION, POWDER, LYOPHILIZED, FOR SOLUTION INTRAMUSCULAR; INTRAVENOUS EVERY 6 HOURS SCHEDULED
Status: DISCONTINUED | OUTPATIENT
Start: 2024-03-13 | End: 2024-03-13

## 2024-03-13 RX ORDER — METHYLPREDNISOLONE SODIUM SUCCINATE 125 MG/2ML
50 INJECTION, POWDER, LYOPHILIZED, FOR SOLUTION INTRAMUSCULAR; INTRAVENOUS EVERY 6 HOURS SCHEDULED
Status: DISCONTINUED | OUTPATIENT
Start: 2024-03-13 | End: 2024-03-13

## 2024-03-13 RX ORDER — METHYLPREDNISOLONE SODIUM SUCCINATE 125 MG/2ML
60 INJECTION, POWDER, LYOPHILIZED, FOR SOLUTION INTRAMUSCULAR; INTRAVENOUS EVERY 6 HOURS SCHEDULED
Status: COMPLETED | OUTPATIENT
Start: 2024-03-13 | End: 2024-03-13

## 2024-03-13 RX ORDER — SULFAMETHOXAZOLE AND TRIMETHOPRIM 800; 160 MG/1; MG/1
2 TABLET ORAL EVERY 12 HOURS SCHEDULED
Status: DISCONTINUED | OUTPATIENT
Start: 2024-03-14 | End: 2024-03-15

## 2024-03-13 RX ORDER — SULFAMETHOXAZOLE AND TRIMETHOPRIM 800; 160 MG/1; MG/1
1 TABLET ORAL EVERY 12 HOURS SCHEDULED
Status: DISCONTINUED | OUTPATIENT
Start: 2024-03-13 | End: 2024-03-13

## 2024-03-13 RX ADMIN — NYSTATIN: 100000 POWDER TOPICAL at 17:48

## 2024-03-13 RX ADMIN — FENTANYL CITRATE 50 MCG: 50 INJECTION INTRAMUSCULAR; INTRAVENOUS at 22:55

## 2024-03-13 RX ADMIN — METHYLPREDNISOLONE SODIUM SUCCINATE 60 MG: 125 INJECTION, POWDER, FOR SOLUTION INTRAMUSCULAR; INTRAVENOUS at 17:41

## 2024-03-13 RX ADMIN — SODIUM CHLORIDE 4 UNITS/HR: 9 INJECTION, SOLUTION INTRAVENOUS at 04:56

## 2024-03-13 RX ADMIN — IPRATROPIUM BROMIDE 0.5 MG: 0.5 SOLUTION RESPIRATORY (INHALATION) at 14:18

## 2024-03-13 RX ADMIN — Medication 2 SPRAY: at 02:30

## 2024-03-13 RX ADMIN — NOREPINEPHRINE BITARTRATE 1 MCG/MIN: 1 SOLUTION INTRAVENOUS at 21:40

## 2024-03-13 RX ADMIN — NOREPINEPHRINE BITARTRATE 1 MCG/MIN: 1 SOLUTION INTRAVENOUS at 04:29

## 2024-03-13 RX ADMIN — METHYLPREDNISOLONE SODIUM SUCCINATE 50 MG: 125 INJECTION, POWDER, FOR SOLUTION INTRAMUSCULAR; INTRAVENOUS at 23:40

## 2024-03-13 RX ADMIN — IPRATROPIUM BROMIDE 0.5 MG: 0.5 SOLUTION RESPIRATORY (INHALATION) at 07:17

## 2024-03-13 RX ADMIN — FENTANYL CITRATE 50 MCG: 50 INJECTION INTRAMUSCULAR; INTRAVENOUS at 12:50

## 2024-03-13 RX ADMIN — LAMOTRIGINE 150 MG: 100 TABLET ORAL at 09:04

## 2024-03-13 RX ADMIN — IPRATROPIUM BROMIDE 0.5 MG: 0.5 SOLUTION RESPIRATORY (INHALATION) at 19:18

## 2024-03-13 RX ADMIN — PROPOFOL 5 MCG/KG/MIN: 10 INJECTION, EMULSION INTRAVENOUS at 12:56

## 2024-03-13 RX ADMIN — TOPIRAMATE 100 MG: 100 TABLET, FILM COATED ORAL at 17:41

## 2024-03-13 RX ADMIN — NIRMATRELVIR AND RITONAVIR 3 TABLET: KIT at 09:59

## 2024-03-13 RX ADMIN — TOPIRAMATE 100 MG: 100 TABLET, FILM COATED ORAL at 09:05

## 2024-03-13 RX ADMIN — SULFAMETHOXAZOLE AND TRIMETHOPRIM 1 TABLET: 800; 160 TABLET ORAL at 16:22

## 2024-03-13 RX ADMIN — SODIUM CHLORIDE 8 UNITS/HR: 9 INJECTION, SOLUTION INTRAVENOUS at 21:12

## 2024-03-13 RX ADMIN — LAMOTRIGINE 150 MG: 100 TABLET ORAL at 17:41

## 2024-03-13 RX ADMIN — OXYCODONE HYDROCHLORIDE 5 MG: 5 SOLUTION ORAL at 09:48

## 2024-03-13 RX ADMIN — LEVALBUTEROL HYDROCHLORIDE 1.25 MG: 1.25 SOLUTION RESPIRATORY (INHALATION) at 19:18

## 2024-03-13 RX ADMIN — LEVALBUTEROL HYDROCHLORIDE 1.25 MG: 1.25 SOLUTION RESPIRATORY (INHALATION) at 14:18

## 2024-03-13 RX ADMIN — REMDESIVIR 100 MG: 100 INJECTION, POWDER, LYOPHILIZED, FOR SOLUTION INTRAVENOUS at 16:16

## 2024-03-13 RX ADMIN — Medication 2 SPRAY: at 13:49

## 2024-03-13 RX ADMIN — Medication 25 MCG/HR: at 04:45

## 2024-03-13 RX ADMIN — ENOXAPARIN SODIUM 40 MG: 40 INJECTION SUBCUTANEOUS at 16:22

## 2024-03-13 RX ADMIN — CEFEPIME 2000 MG: 2 INJECTION, POWDER, FOR SOLUTION INTRAVENOUS at 09:03

## 2024-03-13 RX ADMIN — Medication 2 SPRAY: at 17:48

## 2024-03-13 RX ADMIN — SILVER NITRATE APPLICATORS 1 APPLICATOR: 25; 75 STICK TOPICAL at 03:27

## 2024-03-13 RX ADMIN — NIRMATRELVIR AND RITONAVIR 3 TABLET: KIT at 17:48

## 2024-03-13 RX ADMIN — SILVER NITRATE APPLICATORS 2 APPLICATOR: 25; 75 STICK TOPICAL at 04:29

## 2024-03-13 RX ADMIN — Medication 2 SPRAY: at 09:11

## 2024-03-13 RX ADMIN — METHYLPREDNISOLONE SODIUM SUCCINATE 60 MG: 125 INJECTION, POWDER, FOR SOLUTION INTRAMUSCULAR; INTRAVENOUS at 05:00

## 2024-03-13 RX ADMIN — NYSTATIN 1 APPLICATION: 100000 POWDER TOPICAL at 09:11

## 2024-03-13 RX ADMIN — Medication 2 SPRAY: at 21:13

## 2024-03-13 RX ADMIN — LEVALBUTEROL HYDROCHLORIDE 1.25 MG: 1.25 SOLUTION RESPIRATORY (INHALATION) at 07:17

## 2024-03-13 RX ADMIN — CEFEPIME 2000 MG: 2 INJECTION, POWDER, FOR SOLUTION INTRAVENOUS at 17:11

## 2024-03-13 RX ADMIN — FENTANYL CITRATE 50 MCG: 50 INJECTION INTRAMUSCULAR; INTRAVENOUS at 17:22

## 2024-03-13 RX ADMIN — METHYLPREDNISOLONE SODIUM SUCCINATE 60 MG: 125 INJECTION, POWDER, FOR SOLUTION INTRAMUSCULAR; INTRAVENOUS at 11:47

## 2024-03-13 RX ADMIN — VENLAFAXINE 25 MG: 25 TABLET ORAL at 09:12

## 2024-03-13 RX ADMIN — POLYETHYLENE GLYCOL 3350 17 G: 17 POWDER, FOR SOLUTION ORAL at 09:04

## 2024-03-13 RX ADMIN — FENTANYL CITRATE 50 MCG: 50 INJECTION INTRAMUSCULAR; INTRAVENOUS at 03:24

## 2024-03-13 RX ADMIN — CHLORHEXIDINE GLUCONATE 0.12% ORAL RINSE 15 ML: 1.2 LIQUID ORAL at 21:33

## 2024-03-13 RX ADMIN — Medication 2 SPRAY: at 05:00

## 2024-03-13 RX ADMIN — CHLORHEXIDINE GLUCONATE 0.12% ORAL RINSE 15 ML: 1.2 LIQUID ORAL at 09:04

## 2024-03-13 NOTE — PROGRESS NOTES
Progress Note - Infectious Disease   Carla Hunt 59 y.o. female MRN: [id number]  [facility]: [id number]      Impression/Plan:    1. Acute hypoxic respiratory failure, likely multifactorial, with both COVID and bacterial pneumonia contributing.  Also consider persistent inflammation, fibrosis as seems quite steroid responsive in past.  Most recently extubated 3/1.  Patient with worsening respiratory status requiring intubation again 3/11. Consider progressive bacterial infection given increased secretions, although has been on antibiotics and has no fever, leukocytosis, or focal infiltrates on recent CT.  Consider recrudescence of inflammation in setting of steroid taper as this has been observed previously.  CRP decreased with increase in steroids, no change in antibiotics.  Still has low-level COVID shedding although suspect this is not primarily driving worsening hypoxia.  Status post bronchoscopy and trach 3/12 with excessive secretions seen from the lower lobes bilaterally.  BAL culture from 3/11 shows heavy growth of Stenotrophomonas maltophilia.  While the patient has grown this before and she certainly may be colonized, given worsening CT findings, intubation and prolonged steroids would treat as a true pathogen.  -Continue antivirals for now  -Stop IV cefepime and vancomycin  -Start Bactrim 2 DS tabs twice daily crushed via NG tube  -Steroid dosing per critical care  -No indication to treat Candida in respiratory culture, this is respiratory colonization  -Follow up BAL cultures  -Trend CRP  -If clinically deteriorates with concern for progressive sepsis would add meropenem 1g IV Q8 to the Bactrim     2. SIRS versus sepsis.  Fluctuating fever, WBC.  Fevers may be multifactorial due to COVID (still positive antigen and PCR) versus inflammation (seem to correlate to steroid dosing).  Also consider developing bacterial infection.  Recent blood cultures 2/26 negative.  CT C/A/P 3/10  shows stable groundglass and nodular opacities, inflammation around stoma.  Today there was dehiscence of her abdominal wound following staple removal  -Continue current antivirals  -antibiotics as above  -Follow temperatures closely  -Recheck WBC in AM to monitor infection  -Supportive care as per the primary service     3. Recurrent bacterial pneumonia.  The patient has completed multiple treatment courses over the hospitalization.  Most recent BAL culture with Pseudomonas and stenotrophomonas.  Unclear if pathogens or colonizers.  Status post 10 days levofloxacin.  CT C/A/P showed evolving changes likely all due to sequelae of COVID, infections.  Noted to have worsening hypoxia and purulent secretions on bronchoscopy 3/11.  BAL culture with heavy growth of Stenotrophomonas maltophilia              -Antibiotics as above              -Follow-up BAL culture              -Wean O2 as able     4. Severe COVID, present on admission.  Patient was treated with a 10-day course of remdesivir, dexamethasone and was given 1 dose of Tocilizumab on admission.  COVID antigen was negative prior to coming off isolation.  Had positive COVID PCR from BAL 2/16, status post another 5-day course of remdesivir.  Patient has remained on high-dose systemic corticosteroid throughout her hospitalization which were recently intensified 2/26 for fevers.  Patient having persistent fevers, hypoxia.  COVID antigen positive and PCR is also positive 3/3 and Ct 26.8, consistent with high viral replication.  Repeat PCR positive with Ct now closer to 30.  -Continue remdesivir/Paxlovid to complete a 14-day total course through 3/15/2024  -Steroid dosing per ICU     5. 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  Patient is status post exploratory laparotomy with ileocecectomy and end ileostomy creation 2/20.   End ileostomy is with ongoing ischemic changes which is likely contributing to the imaging findings and ongoing SIRS response.  Now  with wound dehiscence status post staple removal today.  Surgery planning to place wound VAC  -Serial exams  -Surgery follow-up     B-cell lymphoma, on maintenance rituxan, which is currently postponed.  Rituximab is a risk factor for persistent COVID infection.    Above management plan to transition antibiotics to Bactrim with the critical care resident Sindi who is in agreement. Discussed with the  at bedside. ID will follow.    Antibiotics:  Day 12 Paxlovid/remdesivir  Day 10 vancomycin/Cefepime    Subjective:  The patient remains sedated, on the ventilator requiring 40% FiO2.  She is afebrile, no leukocytosis.    Objective:  Vitals:  Temp:  [97.7 °F (36.5 °C)-98.7 °F (37.1 °C)] 98.3 °F (36.8 °C)  HR:  [] 97  Resp:  [24-37] 33  BP: (104-158)/(40-62) 120/49  SpO2:  [90 %-96 %] 96 %  Temp (24hrs), Av.2 °F (36.8 °C), Min:97.7 °F (36.5 °C), Max:98.7 °F (37.1 °C)  Current: Temperature: 98.3 °F (36.8 °C)    Physical Exam:   General Appearance:  Ill-appearing, sedated   Neck: Trach in place   Lungs:   Rhonchi bilaterally   Heart:  RRR; no murmur, rub or gallop   Abdomen:   Midline wound dehiscence with dressing in place, no purulence   Extremities: No edema   Skin: No new rashes or lesions.        Labs:   All pertinent labs and imaging studies were personally reviewed  Results from last 7 days   Lab Units 24  0449 24  0210 24  0758   WBC Thousand/uL 8.87 8.01 9.71   HEMOGLOBIN g/dL 7.4* 7.7* 7.7*   PLATELETS Thousands/uL 163 159 179     Results from last 7 days   Lab Units 24  0449 24  1233 24  0401 03/10/24  1806 03/10/24  0513   SODIUM mmol/L 144 143 145   < > 148*   POTASSIUM mmol/L 4.0 5.0 3.0*   < > 4.0   CHLORIDE mmol/L 114* 113* 111*   < > 112*   CO2 mmol/L 19* 23 23   < > 24   BUN mg/dL 53* 44* 41*   < > 38*   CREATININE mg/dL 1.28 0.96 0.90   < > 0.59*   EGFR ml/min/1.73sq m 46 65 70   < > 101   CALCIUM mg/dL 7.7* 7.5* 7.9*   < > 8.7   AST U/L  --   --   --    --  18   ALT U/L  --   --   --   --  31   ALK PHOS U/L  --   --   --   --  90    < > = values in this interval not displayed.     Results from last 7 days   Lab Units 03/10/24  0513   PROCALCITONIN ng/ml 1.08*     Results from last 7 days   Lab Units 03/13/24  0449 03/12/24  0401 03/11/24  0415 03/10/24  0513 03/09/24  0554 03/08/24  0541 03/07/24  0435   CRP mg/L 73.6* 106.3* 166.1* 112.1* 14.8* 36.9* 109.6*           Results from last 7 days   Lab Units 03/12/24  0446   D-DIMER QUANTITATIVE ug/ml FEU 0.54*         Imaging:  CT chest, abdomen, pelvis personally reviewed and shows persistent bilateral groundglass nodular consolidation

## 2024-03-13 NOTE — UTILIZATION REVIEW
"Continued Stay Review    Date: 03/13                          Current Patient Class: IP  Current Level of Care: Level 2 stepdown/HOT    HPI:59 y.o. female initially admitted on 01/10     Assessment/Plan: Pt s/p tracheostomy yesterday, off propofol, briefly agitated. Had small amt of bleeding on trach site last night, applied silver nitrate to control bleeding. noted to have progression of her midline incision with wound dehiscence. red/brown aspirate noted with fascia intact. Suspect poor wound healing in setting of high-dose steroid therapy.   Wet-to-dry dressings in place.   Trach on MV, on fentanyl gtt. Wean Levo as able. MAP goal >65 CRP trending down, cont to mon. On IV Solu-Medrol 60 mg q6h , plan to slow wean. F/u BAL cxs and studies. Cont IV Cefepime, Flagyl d/c'd due to low concern for anaerobic process. Cont Remdesivir.  Cont Insulin gtt. Plan for wound vac placement today, cont wound care.     Vital Signs: /60   Pulse 97   Temp 98.3 °F (36.8 °C) (Axillary)   Resp (!) 28   Ht 5' 8\" (1.727 m)   Wt 69.1 kg (152 lb 5.4 oz)   SpO2 92%   BMI 23.16 kg/m²       Pertinent Labs/Diagnostic Results:   03/13 CTH:  No acute intracranial pathology. Stable postoperative changes status post left temporal lobectomy. Chronic microangiopathy.  Stable bilateral mastoid effusions, left greater than right and left middle ear effusion.. Stable sinus inflammatory changes.     Results from last 7 days   Lab Units 03/11/24  0417   SARS-COV-2  Positive*     Results from last 7 days   Lab Units 03/13/24  0449 03/13/24  0210 03/12/24  0758 03/12/24  0446 03/12/24  0401 03/10/24  0513 03/09/24  0554   WBC Thousand/uL 8.87 8.01 9.71 11.99* 11.46*   < > 21.99*  21.99*   HEMOGLOBIN g/dL 7.4* 7.7* 7.7* 6.0* 5.9*   < > 8.7*  8.7*   HEMATOCRIT % 22.1* 23.1* 23.6* 19.0* 19.4*   < > 27.8*  27.8*   PLATELETS Thousands/uL 163 159 179 205 207   < > 419*  419*   BANDS PCT %  --   --  14*  --   --   --  2    < > = values in this " interval not displayed.     Results from last 7 days   Lab Units 03/12/24  0401   RETIC CT ABS  94,200   RETIC CT PCT % 5.04*     Results from last 7 days   Lab Units 03/13/24  0449 03/12/24  1233 03/12/24  0401 03/11/24  0415 03/11/24  0028 03/10/24  1806 03/10/24  0513 03/09/24  1358 03/09/24  0554 03/07/24  1641 03/07/24  0435   SODIUM mmol/L 144 143 145 147 149*   < > 148*   < > 145   < > 150*   POTASSIUM mmol/L 4.0 5.0 3.0* 4.1 4.3   < > 4.0   < > 3.8   < > 2.7*   CHLORIDE mmol/L 114* 113* 111* 115* 116*   < > 112*   < > 110*   < > 112*   CO2 mmol/L 19* 23 23 21 22   < > 24   < > 24   < > 27   ANION GAP mmol/L 11 7 11 11 11   < > 12   < > 11   < > 11   BUN mg/dL 53* 44* 41* 39* 37*   < > 38*   < > 29*   < > 34*   CREATININE mg/dL 1.28 0.96 0.90 0.69 0.65   < > 0.59*   < > 0.52*   < > 0.48*   EGFR ml/min/1.73sq m 46 65 70 96 98   < > 101   < > 105   < > 108   CALCIUM mg/dL 7.7* 7.5* 7.9* 8.3* 8.3*   < > 8.7   < > 8.4   < > 8.6   CALCIUM, IONIZED mmol/L 1.12  --   --   --   --   --   --   --   --   --   --    MAGNESIUM mg/dL 2.0  --  2.1 2.2  --   --  2.2  --  2.2   < > 2.2   PHOSPHORUS mg/dL 3.9  --   --   --   --   --   --   --   --   --  2.5*    < > = values in this interval not displayed.     Results from last 7 days   Lab Units 03/10/24  0513   AST U/L 18   ALT U/L 31   ALK PHOS U/L 90   TOTAL PROTEIN g/dL 5.5*   ALBUMIN g/dL 2.9*   TOTAL BILIRUBIN mg/dL 0.59   BILIRUBIN DIRECT mg/dL 0.22*     Results from last 7 days   Lab Units 03/13/24  1348 03/13/24  1145 03/13/24  0954 03/13/24  0746 03/13/24  0609 03/13/24  0428 03/13/24  0210 03/12/24  2355 03/12/24  2159 03/12/24  2015 03/12/24  1806 03/12/24  1556   POC GLUCOSE mg/dl 119 130 111 133 185* 133 180* 185* 128 141* 153* 143*     Results from last 7 days   Lab Units 03/13/24  0449 03/12/24  1233 03/12/24  0401 03/11/24  0415 03/11/24  0028 03/10/24  1806 03/10/24  0513 03/09/24  2225 03/09/24  1358 03/09/24  0554 03/08/24  1427 03/08/24  0541   GLUCOSE  "RANDOM mg/dL 123 106 128 238* 116 215* 176* 187* 143* 151* 80 191*             No results found for: \"BETA-HYDROXYBUTYRATE\"   Results from last 7 days   Lab Units 03/12/24  2356 03/12/24  0832 03/12/24  0757   PH ART  7.319* 7.330* 7.338*   PCO2 ART mm Hg 37.8 40.7 40.5   PO2 ART mm Hg 83.4 115.3 132.6*   HCO3 ART mmol/L 19.0* 21.0* 21.3*   BASE EXC ART mmol/L -6.5 -4.6 -4.2   O2 CONTENT ART mL/dL 10.9* 12.0* 11.7*   O2 HGB, ARTERIAL % 94.5 97.1* 97.5*   ABG SOURCE  Line, Arterial Line, Arterial Line, Arterial     Results from last 7 days   Lab Units 03/10/24  0534   PH NICA  7.391   PCO2 NICA mm Hg 39.2*   PO2 NICA mm Hg 40.3   HCO3 NICA mmol/L 23.3*   BASE EXC NICA mmol/L -1.5   O2 CONTENT NICA ml/dL 9.9   O2 HGB, VENOUS % 73.3             Results from last 7 days   Lab Units 03/09/24  1358   HS TNI 0HR ng/L 60*     Results from last 7 days   Lab Units 03/12/24  0446   D-DIMER QUANTITATIVE ug/ml FEU 0.54*     Results from last 7 days   Lab Units 03/12/24  0446 03/12/24  0431   PROTIME seconds  --  16.8*   INR   --  1.39*   PTT seconds 33  --          Results from last 7 days   Lab Units 03/10/24  0513   PROCALCITONIN ng/ml 1.08*     Results from last 7 days   Lab Units 03/10/24  1618 03/10/24  0851 03/10/24  0513   LACTIC ACID mmol/L 1.4 3.3* 3.5*                         Results from last 7 days   Lab Units 03/13/24  0555 03/08/24  0801 03/07/24  0555   UNIT PRODUCT CODE  [id number]  [id number]  [id number] [id number]   UNIT NUMBER  [id number]  [id number]  [id number]   UNITABO  O  A A  A A   UNITRH  NEG  NEG NEG  NEG NEG   CROSSMATCH  Compatible  Compatible Compatible  Compatible Compatible   UNIT DISPENSE STATUS  Presumed Trans  Presumed Trans Return to Inv  Return to Inv Presumed Trans   UNIT PRODUCT VOL mL 350  350 350  350 350             Results from last 7 days   Lab Units 03/13/24  0449 03/12/24  0401 03/11/24  0415 03/10/24  0513 03/09/24  0554   CRP " mg/L 73.6* 106.3* 166.1* 112.1* 14.8*             Results from last 7 days   Lab Units 03/11/24  1306   GRAM STAIN RESULT  3+ Polys*  3+ Gram variable rods*  1+ Budding Yeast with Pseudomycelia*             Results from last 7 days   Lab Units 03/13/24  0449 03/12/24  0441 03/11/24  0557   VANCOMYCIN RM ug/mL 27.9* 31.0* 26.7*       Medications:   Scheduled Medications:  cefepime, 2,000 mg, Intravenous, Q8H  chlorhexidine, 15 mL, Mouth/Throat, Q12H SARTHAK  enoxaparin, 40 mg, Subcutaneous, Q24H SARTHAK  famotidine, 20 mg, Per NG Tube, BID  ipratropium, 0.5 mg, Nebulization, TID  lamoTRIgine, 150 mg, Oral, BID  levalbuterol, 1.25 mg, Nebulization, TID  methylPREDNISolone sodium succinate, 60 mg, Intravenous, Q6H SARTHAK  nirmatrelvir & ritonavir, 3 tablet, Oral, BID  nystatin, , Topical, BID  polyethylene glycol, 17 g, Per NG Tube, Daily  propofol, 100 mg, Intravenous, Once  remdesivir, 100 mg, Intravenous, Q24H  sodium chloride, 2 spray, Each Nare, Q4H  sulfamethoxazole-trimethoprim, 1 tablet, Per NG Tube, Q12H SARTHAK  topiramate, 100 mg, Per PEG Tube, BID  venlafaxine, 25 mg, Oral, Daily      Continuous IV Infusions:  fentaNYL, 50 mcg/hr, Intravenous, Continuous  insulin regular (HumuLIN R,NovoLIN R) 1 Units/mL in sodium chloride 0.9 % 100 mL infusion, 0.3-21 Units/hr, Intravenous, Titrated  norepinephrine, 1-30 mcg/min, Intravenous, Titrated  propofol, 5-50 mcg/kg/min, Intravenous, Titrated      PRN Meds:  acetaminophen, 650 mg, Oral, Q6H PRN  fentaNYL, 50 mcg, Intravenous, Q1H PRN  OLANZapine, 10 mg, Oral, HS PRN  ondansetron, 4 mg, Intravenous, Q6H PRN  oxyCODONE, 2.5 mg, Oral, Q4H PRN   Or  oxyCODONE, 5 mg, Oral, Q4H PRN  sodium chloride, 1 Application, Nasal, Q1H PRN  vancomycin, 500 mg, Intravenous, Daily PRN        Discharge Plan: TBD    Network Utilization Review Department  ATTENTION: Please call with any questions or concerns to [phone] and carefully listen to the prompts so that you are directed to the right  person. All voicemails are confidential.   For Discharge needs, contact Care Management DC Support Team at [phone] opt. [phone]  Send all requests for admission clinical reviews, approved or denied determinations and any other requests to dedicated fax number below belonging to the campus where the patient is receiving treatment. List of dedicated fax numbers for the Facilities:  FACILITY NAME UR FAX NUMBER   ADMISSION DENIALS (Administrative/Medical Necessity) [phone]   DISCHARGE SUPPORT TEAM (NETWORK) [phone]   PARENT CHILD HEALTH (Maternity/NICU/Pediatrics) [phone]   [facility] [phone]   [facility] [phone]   [facility] [phone]   [facility] [phone]   [facility] [phone]   [facility] [phone]   [facility] [phone]   [facility] [phone]   [facility] [phone]   [facility] [phone]   [facility] [phone]   [facility] [phone]

## 2024-03-13 NOTE — PROGRESS NOTES
"Progress Note -General Surgery  Carla Hunt 59 y.o. female MRN: [id number]  Unit/Bed#: [facility]U 10 Encounter: [id number]    Assessment:  Patient is a 59 y.o. female with COVID/strep pna, B cell lymphoma on rituxumab, CKD; hospitalization c/b cecal volvulus s/p OR for exlap ileocectomy end ileostomy w/mucus fistula 2/20.   - Wound dehiscence s/p staple removal  - Bedside trach placement 3/12    Plan:  Plan for wound vac placement today  Remainder per MICU  Please TigerText on call Red Surgery or Acute Care Surgery Floor Call with any questions     Subjective/Objective     Subjective:   Wound opened further yesterday evening after staple removal yesterday morning. Stable levo requirements overnight.    Pertinent review of systems as above. All other review of systems negative.    Objective:    Blood pressure 117/54, pulse 93, temperature 97.7 °F (36.5 °C), temperature source Axillary, resp. rate (!) 28, height 5' 8\" (1.727 m), weight 70.4 kg (155 lb 3.3 oz), SpO2 96%.,Body mass index is 23.6 kg/m².      Intake/Output Summary (Last 24 hours) at 3/13/2024 0608  Last data filed at 3/13/2024 0600  Gross per 24 hour   Intake 2665.42 ml   Output 2795 ml   Net -129.58 ml       Invasive Devices       Central Venous Catheter Line  Duration             CVC Central Lines 02/20/24 21 days              Peripheral Intravenous Line  Duration             Long-Dwell Peripheral IV (Midline) 03/08/24 Left Cephalic Vein 4 days              Arterial Line  Duration             Arterial Line 01/10/24 62 days              Drain  Duration             Ileostomy RUQ 21 days    NG/OG/Enteral Tube Nasogastric 14 Fr Right nare 7 days    Urethral Catheter Latex 16 Fr. 7 days              Airway  Duration             ETT  Cuffed 7.5 mm 62 days                    Physical Exam:   Gen:  NAD.  HEENT: NCAT. MMM, trach intact  CV: well perfused  Lungs: Normal respiratory effort  Abd: soft, nt/nd dressings cdi, ostomy intact  Skin: warm/ " dry  Extremities: no peripheral edema, no clubbing or cyanosis  Neuro: sedated      I have personally reviewed pertinent films in PACS.

## 2024-03-13 NOTE — PROGRESS NOTES
Vancomycin IV Pharmacy-to-Dose Consultation    Carla Hunt is a 59 y.o. female who is currently receiving Vancomycin IV with management by the Pharmacy Consult service.    Relevant clinical data and objective / subjective history reviewed.      Vancomycin Assessment:  Indication: Other Ostomy infection  Status: critically ill  Micro:   - 3/11 Bronchial culture: Steno. Maltophilia + Candida albicans  - 3/11 COVID: (+)  Procalcitonin: 1.08 on 3/10  Renal Function:     Lab Results   Component Value Date    CREATININE 1.28 03/13/2024     Estimated Creatinine Clearance: 48.3 mL/min (by C-G formula based on SCr of 1.28 mg/dL).  Dialysis: no  Days of Therapy: 10 (1 day gap between discontinuation of previous regimen and initiation of current regimen.)  Current Dose: 500 mg IV q24h as needed for level less than 15   Goal AUC / Trough: -600, trough >10   Last Level: 27.9 on 3/13  Model Fit:  N/A  Assessment: No dose given today due to supratherapeutic level. Renal function worsening, vancomycin changed to PRN dosing. ID following.      Vancomycin Plan:  New Dosing: continue current regimen   Predicted Trough / AUC: N/A  Next Level: on 3/14 at 0600  Renal Function Monitoring: Daily BMP and UOP      Pharmacy will continue to follow closely for s/sx of nephrotoxicity, infusion reactions and appropriateness of therapy.  BMP and CBC will be ordered per protocol. We will continue to follow the patient’s culture results and clinical progress daily.       Gabe Henriquez, PharmD, BCCCP  Critical Care Clinical Pharmacist  Available via Tiger Text

## 2024-03-13 NOTE — PROGRESS NOTES
[facility]  Progress Note: Critical Care  Name: Carla Hunt 59 y.o. 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 - noted stomal retraction/necrosis and cellulitis   Midline incision with poor wound healing  Anemia chronic inflammation  History of B-Cell lymphoma, Rituxan maintenance therapy on hold  Minimal SAH POA, no interventions required  Seizure disorder; on home AEDs  Steroid induced hyperglycemia; on insulin gtt   Elevated TG  CKD, resolved ELIESER  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  Currently on Levophed at 3mcg.min  Plan:  Wean Levophed today as able  Remove central line today  Maintain MAPs > 65 mmHg     Pulm:  Diagnosis: Acute hypoxic respiratory failure   In setting of severe COVID, POA, persistently positive since admission.  Likely multifactorial due to COVID pneumonitis in setting of persistent COVID-19 infection and possible recurrent bacterial  pneumonia.Persistent inflammation and fibrosis also likely given patient has been steroid responsive throughout admission.  Previously completed course of remdesivir/dexamethasone/Actemra.  Resumed Paxlovid and remdesivir after COVID PCR positive on 3/3.  Has also had multiple bronchoscopies throughout admission (2/2, 2/16, 2/21) subsequent Legionella/viral/fungal cultures NGTD, PCP and AFB culture were also negative.    Previously intubated 2/13, extubated 3/1.  Reintubated 3/11 due to increased WOB, increased purulent secretions noted, suggestive of progressive bacterial infection in setting of elevated , however has been on broad-spectrum antibiotics, afebrile, with improved leukocytosis, and no new focal infiltrates on most recent CT imaging 2/24.  Repeat BAL studies obtained 3/11.   S/P tracheostomy 3/12  CRP continues downward trend, CRP 73 today, compared to 106  Repeat CXR 3/12 with grossly unchanged diffuse parenchymal disease   Plan:  IV Solu-Medrol 60 mg q6h. Plan for slow steroid wean in addition to potential steroid staring therapy with ?azathioprine in consultation with pharmacy/heme-onc.   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  Monitor output of ostomy pouch and Hemoglobin  Stoma appearing slightly retracted, dark brown ostomy output  Surgery following, will keep them updated if any further changes  Tube feeds running     On famotidine 20 mg QD for GI prophylaxis and to reduce risk of  upper GIB in this patient on significant amount of steroid regimen     :   Diagnosis: CKD III  Baseline Cr appears to be 0.8-1.2, renal function at baseline  UA unremarkable   Continue to monitor I/O and UOP        F/E/N:   F: none    E: monitor and replete for goal K>4, P>3, Mg>2  N: On tube feeds with vital 1.5; 35 cc/h, Prosource liquid protein to 1 packet twice daily     Heme/Onc:   Diagnosis: Anemia  Hgb recently has been around 7-8.  Transfused 2U on 3/13 due to Hgb <6.  Total of 6U transfused since admission  Likely multifactorial in nature, acute versus early nearly chronic in the setting of inflammatory state as evidenced by ferritin 974, likely further complicated by chronic anemia due to her other history of lymphoma and CKD  Plan:  Trend CBC, transfuse to maintain Hgb>7     Diagnosis: B cell lymphoma  Follows [facility] heme[facility]onc [facility]  S/P 6 cycles of chemotherapy   Maintained on Rituxan for 2 year course PTA, started May 2022  Last dose was 12/20, treatment currently on hold   Leukopenic on admission but WBC now limited likely in setting of steroid therapy     Plan:  Holding rituximab in setting of persistent COVID-19 infection      DVT ppx with lovenox; being held in setting of anemia, planned tracheostomy     Endo:   Diagnosis: steroid hyperglycemia and diabetes mellitus type 2, A1c at 6.6 from 01/2024  Goal -180  BG range last 24hrs 140-200s  Plan:   On insulin gtt        ID:   Diagnosis: Severe covid pneumonia and recurrent bacterial pneumonia  Patient has completed multiple treatment courses of remdesivir throughout hospitalization in addition to 7 days of ceftriaxone.   Most recent BAL cultures in 2/2024 positive for MDR Pseudomonas and stenotrophomonas treated with course of Levaquin  Completed severe COVID pathway with decadron (ended 1/22) and remdesivir (ended 1/20), also completed 7 days CTX on 1/15  CT C/A/P 3/10 with persistent groundglass opacities and changes suggestive of sequelae of  COVID, prior infections.  Was noted to have worsening hypoxia with purulent secretions requiring reintubation 3/11 however has been afebrile with improved leukocytosis. Repeat BAL cultures pending.  CRP with down trending. Remains on antivirals and broad-spectrum antibiotics.  Plan:  Continue cefepime, vancomycin  Vancomycin dosing as per pharmacy, appreciate input  Flagyl d/c'd due to low concern for anaerobic process.  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  An ileostomy with ischemic changes likely contributing to imaging findings and SIRS  Continue cefepime, vancomycin  Serial examinations  Gen sugery following, appreciate reccs; no plans for surgical intervention at this time       MSK/Skin:   Diagnosis: Midline incision wound with poor wound healing  General surgery performed staple removal of the patient's midline incision on 3/12 with some skin signs appreciated at the inferior aspect of the wound without obvious pus drainage. Overnight on 3/13, wound dehiscence progressed requiring general surgery reevaluation.Red/brown aspirate noted, fascia intact. Wet-to-dry dressings in place. General surgery following for next Epson wound care. Suspect poor wound healing in setting of high-dose steroid therapy.  On exam no obvious signs of infection at this time.    Plan:   Plan for wound vac placement today as per gen surgery  Will continue routine monitoring, wound care as per general surgery.    Diagnosis: Critical illness myopathy  Likely exacerbated by high-dose steroid therapy  Plan:  Continue IV Solu-Medrol every 6 hours with plan to taper slowly    Disposition: Critical care    ICU Core Measures     Vented Patient  VAP Bundle  VAP bundle ordered     A: Assess, Prevent, and Manage Pain Has pain been assessed? Yes  Need for changes to  pain regimen? No   B: Both Spontaneous Awakening Trials (SATs) and Spontaneous Breathing Trials (SBTs) Plan to perform spontaneous awakening trial today? Yes   Plan to perform spontaneous breathing trial today? Yes   Obvious barriers to extubation? NA   C: Choice of Sedation RASS Goal: -1 Drowsy or 0 Alert and Calm  Need for changes to sedation or analgesia regimen? No   D: Delirium CAM-ICU: Negative   E: Early Mobility  Plan for early mobility? Yes   F: Family Engagement Plan for family engagement today? Yes       Antibiotic Review: Awaiting culture results.  and Continue broad spectrum secondary to severity of illness.     Review of Invasive Devices:    [place] Plan: Continue for accurate I/O monitoring for 48 hours  Central access plan: Patient has multiple central venous catheters.   [place] Plan: Keep arterial line for hemodynamic monitoring and frequent ABGs    Prophylaxis:  VTE VTE covered by:  enoxaparin, Subcutaneous       Stress Ulcer  covered byfamotidine (PEPCID) tablet 20 mg [[id number]]        Significant 24hr Events     24hr events: Status post tracheostomy on 3/12.  Patient was taken off propofol at around 2100 on 3/12, found to be agitated briefly.  Small amount of bleeding noted at trach site throughout the night.  Hemoglobin 7.7.  Was evaluated by general surgery, silver nitrate applied to control bleeding.  Additionally, patient noted to have progression of her midline incision with wound dehiscence.  General surgery evaluated, red/brown aspirate noted with fascia intact.  Wet-to-dry dressings in place.       Subjective     Review of Systems   Unable to perform ROS: Other   Patient sedated s/p tracheostomy      Objective                            Vitals I/O      Most Recent Min/Max in 24hrs   Temp 97.7 °F (36.5 °C) Temp  Min: 97.7 °F (36.5 °C)  Max: 98.9 °F (37.2 °C)   Pulse 101 Pulse  Min: 89  Max: 118   Resp (!) 30 Resp  Min: 26  Max: 39   /54 No data recorded   O2 Sat 96 % SpO2  Min: 92 %   Max: 100 %        Vent: PC 16/6/28/40%    LDA:  Trach collar  Arterial line  Central line; left IJ, 21 days  Ileostomy, RUQ, 21 days  NGT, 7 days  Ortiz, 7 days   Intake/Output Summary (Last 24 hours) at 3/13/2024 0801  Last data filed at 3/13/2024 0600  Gross per 24 hour   Intake 2173.77 ml   Output 2570 ml   Net -396.23 ml       Diet Enteral/Parenteral; Tube Feeding No Oral Diet; Vital 1.5; Continuous; 35; Prosource Protein Liquid - One Packet; BID; 300; Water; Every 6 hours    Invasive Monitoring   Arterial Line  Amelia /52  Arterial Line BP  Min: 94/50  Max: 174/76   MAP 66 mmHg  Arterial Line MAP (mmHg)  Min: 62 mmHg  Max: 108 mmHg           Physical Exam   Physical Exam  Eyes:      Pupils: Pupils are equal, round, and reactive to light.   Skin:     General: Skin is warm.      Findings: Wound (midline incision wound, no active drainainge, dressing in place) present. No lesion.   HENT:      Nose: Nasogastric tube present. No epistaxis.      Mouth/Throat:      Mouth: Mucous membranes are moist.   Neck:      Vascular: Central line present. No JVD.      Trachea: Tracheostomy present.   Cardiovascular:      Rate and Rhythm: Normal rate and regular rhythm.      Pulses: Normal pulses.      Heart sounds: Normal heart sounds.   Abdominal: General: An ostomy site is present. There is ostomy site.There is no distension.      Palpations: Abdomen is soft.   Constitutional:       Appearance: She is ill-appearing.      Interventions: She is sedated.   Pulmonary:      Effort: Tachypnea present. No accessory muscle usage or accessory muscle usage.      Comments: Trach   Neurological:      Mental Status: She is somnolent.      Comments: Opens eyes to voice but doesn't track, doesn't follow simple commands     Genitourinary/Anorectal:  Ortiz present.          Diagnostic Studies      EKG: Sinus tachycardia  Poor R Wave Progression  Nonspecific ST and T wave abnormality  Abnormal ECG    Imaging:   CXR 3/12 Tubes and lines in  satisfactory position.Grossly unchanged diffuse parenchymal disease  I have personally reviewed pertinent reports.       Medications:  Scheduled PRN   cefepime, 2,000 mg, Q8H  chlorhexidine, 15 mL, Q12H SARTHAK  enoxaparin, 40 mg, Q24H SARTHAK  famotidine, 20 mg, BID  ipratropium, 0.5 mg, TID  lamoTRIgine, 150 mg, BID  levalbuterol, 1.25 mg, TID  methylPREDNISolone sodium succinate, 60 mg, Q6H SARTHAK  nirmatrelvir & ritonavir, 3 tablet, BID  nystatin, , BID  polyethylene glycol, 17 g, Daily  propofol, 100 mg, Once  remdesivir, 100 mg, Q24H  sodium chloride, 2 spray, Q4H  topiramate, 100 mg, BID  venlafaxine, 25 mg, Daily      acetaminophen, 650 mg, Q6H PRN  fentaNYL, 50 mcg, Q1H PRN  OLANZapine, 10 mg, HS PRN  ondansetron, 4 mg, Q6H PRN  oxyCODONE, 2.5 mg, Q4H PRN   Or  oxyCODONE, 5 mg, Q4H PRN  sodium chloride, 1 Application, Q1H PRN  vancomycin, 500 mg, Daily PRN       Continuous    fentaNYL, 25 mcg/hr, Last Rate: 25 mcg/hr (03/13/24 0445)  insulin regular (HumuLIN R,NovoLIN R) 1 Units/mL in sodium chloride 0.9 % 100 mL infusion, 0.3-21 Units/hr, Last Rate: 12 Units/hr (03/13/24 0610)  norepinephrine, 1-30 mcg/min, Last Rate: Stopped (03/13/24 0629)  propofol, 5-50 mcg/kg/min, Last Rate: Stopped (03/12/24 2230)         Labs:    CBC    Recent Labs     03/12/24  0758 03/13/24  0210 03/13/24  0449   WBC 9.71 8.01 8.87   HGB 7.7* 7.7* 7.4*   HCT 23.6* 23.1* 22.1*    159 163   BANDSPCT 14*  --   --      BMP    Recent Labs     03/12/24  1233 03/13/24  0449   SODIUM 143 144   K 5.0 4.0   * 114*   CO2 23 19*   AGAP 7 11   BUN 44* 53*   CREATININE 0.96 1.28   CALCIUM 7.5* 7.7*       Coags    Recent Labs     03/12/24  0431 03/12/24  0446   INR 1.39*  --    PTT  --  33        Additional Electrolytes  Recent Labs     03/12/24  0401 03/13/24  0449   MG 2.1 2.0   PHOS  --  3.9   CAIONIZED  --  1.12          Blood Gas    Recent Labs     03/12/24  2356   PHART 7.319*   RTA9LDM 37.8   PO2ART 83.4   VJE5UDO 19.0*   BEART -6.5    SOURCE Line, Arterial     Recent Labs     24  2356   SOURCE Line, Arterial    LFTs  No recent results   T, stable    CRP 73, down-trending from 106    Vancomycin trough 27.9   Infectious  No recent results   Bronchial gram stain with 3+ polys, 3+ gram variable rods, 1+ budding yeast     AFB/fungal/viral bronchial Cx's pending Glucose  Recent Labs     24  0401 24  1233 24  0449   GLUC 128 106 123               Joe Lazcano, DO

## 2024-03-13 NOTE — RESPIRATORY THERAPY NOTE
RT Ventilator Management Note  Carla Hunt 59 y.o. female MRN: [id number]  Unit/Bed#: [facility] 10 Encounter: [id number]      Daily Screen         3/12/2024  0741 3/13/2024  0708          Patient safety screen outcome:: Failed Failed      Not Ready for Weaning due to:: Underline problem not resolved --                Physical Exam:   Assessment Type: Assess only  O2 Device: (P) vent      Resp Comments: (P) Pt received on PC settings. Tolerating well. No changes at this time. RT will cont to monitor.     03/13/24 0708   Respiratory Assessment   Resp Comments Pt received on PC settings. Tolerating well. No changes at this time. RT will cont to monitor.   O2 Device vent   Vent Information   Vent ID [id number]   Vent type Summers G5   Summers Vent Mode P-CMV/PCV+   $ Pulse Oximetry Spot Check Charge Completed   P-CMV/PCV+ Settings   Resp Rate (BPM) 28 BPM   Pressure Control/Pinsp (cmH20) 16 cmH20   Insp Time (S) 0.7 sec   FiO2 (%) 40 %   PEEP (cmH2O) 6 cmH2O   I:E Ratio 1:2.1   P-ramp (ms) 50 (ms)   Flow Trigger (LPM) 3 LPM   Humidification Heater   P-CMV/PCV+ Actuals   Resp Rate (BPM) 31 BPM   VT (mL) 466 mL   MV 13.4   MAP (cmH2O) 12 cmH2O   Peak Pressure (cmH2O) 31 cmH2O   I:E Ratio (Obs) 1:2.1   Static Compliance (mL/cmH2O) 25 mL/cmH2O   Plateau Pressure (cmH2O) 24 cmH2O   Heater Temperature (Obs) 95 °F (35 °C)   P-CMV/PCV+ Alarms    High Peak Pressure (cmH2O) 45 cmH2O   Low Pressure (cmH2O) 5 cm H2O   High Resp Rate (BPM) 40 BPM   Low Resp Rate (BPM) 8 BPM   High MV (L/min) 20 L/min   Low MV (L/min) 5 L/min   High Spont VTE (mL) 1000 mL   Low Spont VTE (mL) 200 mL   Maintenance   Alarm (pink) cable attached No   Resuscitation bag with peep valve at bedside Yes   Water bag changed No   Circuit changed No   Daily Screen   Patient safety screen outcome: Failed   IHI Ventilator Associated Pneumonia Bundle   Daily Assessment of Readiness to Extubate Yes   Surgical Airway Shiley Cuffed   Placement Date/Time: 03/12/24  1200   Tube Size: 8 mm  Type: Tracheostomy  Brand: Naveen  Style: Cuffed   Status Cuff Inflated;Secured   Surgical Airway Cuff Pressure (color) Green   Equipment at bedside BVM;Wall Suction setup;Additional complete same size trach tube;Additional complete one size smaller trach tube;Obturator;Sterile saline;Additional inner cannula

## 2024-03-13 NOTE — WOUND OSTOMY CARE
Progress Note - Wound   Carla Hunt 59 y.o. female MRN: [id number]  Unit/Bed#: MICU 10 Encounter: [id number]        Assessment:   Patient seen today for wound care follow up assessment. Patient is . Patient is dependent for all care, on high flow O2 in MICU, on critical care low air loss mattress. Patient is receiving parenteral nutrition. Patient is turned and repositioned with green foam wedges. Family at bedside updated on findings.     HA stage III-  100% pink/yellow tissue partial thickness, no drainage. Wound has improved in size with this week's assessment. Continuing with foam dressing.  Ostomy- stool leaking into abdominal incision. Removed dressing and ostomy pouch. Irrigated wound with normal saline before placing in new gauze. Placed one piece flat ostomy appliance, opening off center to accommodate incision, strip paste placed along incision line and then hydrocolloid placed over top. Patient to have wound vac placed today.  Stoma is recessed and covered in slough, patient likely to benefit from strip paste and convex pouch with wound vac.     B/L heels intact and blanching, preventative skin care orders placed.      No induration, fluctuance, odor, warmth/temperature differences, redness, or purulence noted to the above noted wounds and skin areas assessed. New dressings applied per orders listed below. Patient tolerated well- no s/s of non-verbal pain or discomfort observed during the encounter. Bedside nurse aware of plan of care. See flow sheets for more detailed assessment findings. Wound care will continue to follow.         Plan:   Cleanse b/l sacro-buttocks with soap and water, pat dry, and apply bordered silicone foam dressing to cover the wounds. Change every other day and as needed for soilage/dislodgement.      Cleanse b/l groin with soap and water, pat dry, and dust the skin lightly with nystatin powder per order. May use alginate rope to skin folds if skin is open or macerated.  Change BID and PRN.     Ostomy teaching when more medically stable.    Wound 02/12/24 Pressure Injury Buttocks (Active)   Wound Image   03/13/24 1332   Wound Description Pink;Granulation tissue 03/13/24 1332   Pressure Injury Stage 3 03/13/24 1332   Sofie-wound Assessment Fragile 03/13/24 1332   Wound Length (cm) 1 cm 03/13/24 1332   Wound Width (cm) 0.5 cm 03/13/24 1332   Wound Depth (cm) 0.1 cm 03/13/24 1332   Wound Surface Area (cm^2) 0.5 cm^2 03/13/24 1332   Wound Volume (cm^3) 0.05 cm^3 03/13/24 1332   Calculated Wound Volume (cm^3) 0.05 cm^3 03/13/24 1332   Change in Wound Size % 66.67 03/13/24 1332   Wound Site Closure WILL 03/13/24 1332   Drainage Amount Scant 03/13/24 1332   Drainage Description Sanguineous 03/13/24 1332   Non-staged Wound Description Partial thickness 03/13/24 1332   Treatments Cleansed 03/13/24 1332   Dressing Foam, Silicon (eg. Allevyn, etc) 03/13/24 1332   Wound packed? No 03/13/24 1332   Packing- # removed 0 03/13/24 1332   Packing- # inserted 0 03/13/24 1332   Dressing Changed New 03/13/24 1332   Patient Tolerance Tolerated well 03/13/24 1332   Dressing Status Clean;Dry;Intact 03/13/24 1332         Molly Bustos BSN, RN, CWOCN

## 2024-03-13 NOTE — PLAN OF CARE
Problem: Prexisting or High Potential for Compromised Skin Integrity  Goal: Skin integrity is maintained or improved  Description: INTERVENTIONS:  - Identify patients at risk for skin breakdown  - Assess and monitor skin integrity  - Assess and monitor nutrition and hydration status  - Monitor labs   - Assess for incontinence   - Turn and reposition patient  - Assist with mobility/ambulation  - Relieve pressure over bony prominences  - Avoid friction and shearing  - Provide appropriate hygiene as needed including keeping skin clean and dry  - Evaluate need for skin moisturizer/barrier cream  - Collaborate with interdisciplinary team   - Patient/family teaching  - Consider wound care consult   3/12/2024 2148 by Charlotte Morales RN  Outcome: Progressing  3/12/2024 2148 by Charlotte Morales RN  Outcome: Progressing

## 2024-03-13 NOTE — PHYSICAL THERAPY NOTE
Physical Therapy Cancellation Note         03/13/24 0840   PT Last Visit   PT Visit Date 03/13/24   Note Type   Note Type Cancelled Session   Cancel Reasons Intubated/sedated  (pt placed back on vent; s/p trach and PEG- also to go for wound vac placement later today. will defer.)

## 2024-03-13 NOTE — PROGRESS NOTES
"Progress Note - Neurology   Carla Hunt 59 y.o. female MRN: [id number]  Unit/Bed#: MICU 10 Encounter: [id number]      Assessment/Plan     Encephalopathy  Assessment & Plan  59 y.o. 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 Pt found to have COVID infection and ELIESER in ED. CTH with bifrontal hyperdensities concerning for hemorrhage vs calcifications.  Patient has had complicated hospital course including cecal volvulus s/p bowel resection with subsequent wound dehiscence.    -1/8 Neurology consulted with concern for focal seizures in setting of covid infection and worsening renal function.   -1/9 loaded with Vimpat 400 mg IV x 1 and Topiramate increased to 150 mg twice daily.   -1/10: Continued to be encephalopathic with periods of agitation/combativeness. Afebrile since 1/9 afternoon. No overt seizure-like activity noted by family or staff. On high-flow oxygen, remdesivir and steroids for underlying COVID. Renal function improved. MRI brain w/wo without evidence of stroke. Patient transferred to \[facility]\"" neuro ICU for ongoing management/video EEG monitoring  -1/11: Remained encephalopathic with Tmax 99.9/24hrs. Continued on high flow oxygen. Na up 152.   -Video EEG prelim report per epileptologist: \"diffuse slowing, focal slowing over left temporal region. No seizures\"  -LP attempted with low flow CSF, patient very restless. Initially clear fluid then blood tinged. Procedure aborted. Limited CSF results: M/E panel wnl, WBC 1, protein 50, Gram stain with no polys or bacteria  -CSF culture/gram " "stain (-), HSV PCR (-)  -Lamotrigine level elevated at 21.8, dosing adjusted/decreased to 150mg bid (home dose 200mg bid) with level 9.5 as of 3/7/24  -Topiramate level 13.1, continued at 150mg bid    Neurodiagnostics:   - CTA H/N wwo contrast 1/7/24:   \"No acute arterial vascular abnormality in the head or neck. No flow-limiting large vessel arterial vascular stenosis in the head or neck. Tiny punctate foci of relative increased parenchymal density in the frontal lobes bilaterally, unchanged from 2 hours earlier and probably representing low-density parenchymal calcifications rather than parenchymal hemorrhage. Conservative management with an additional follow-up noncontrast head CT is recommended in 24 to 48 hours. Reidentified left temporal lobe resection. Dense basal ganglia and cerebellar calcifications again noted. Groundglass and consolidative airspace opacities most suspicious for extensive pneumonia seen throughout the visualized mid and upper lung zones more dense on the right than on the left.\"  - Routine EEG 1/8/24:   \"This is an abnormal 30 minutes awake, drowsy, and asleep EEG due to disorganized theta-delta activity. Background activities in the delta/theta range are suggestive of a moderate diffuse cerebral dysfunction. The lack of epileptiform discharges on a routine EEG does not preclude the diagnosis of seizures or epilepsy. \"  - MRI brain wo 1/9/24:  \"Tiny focus of abnormal diffusion signal within the right basal ganglia involving the anterior limb of the internal capsule not clearly restricted on the ADC maps but suspicious for small acute lacunar infarct.\"   - MRI brain seizure wo and w contrast 1/10/24:  \"Previously visualized diffusion signal abnormality in the right basal ganglia is no longer identified and may have been artifactual given underlying susceptibility artifact in the bilateral globus pallidus.\"  - Repeat CTH 3/6 and 3/9 with no acute changes.  - vEEG 1/10 - 1/12: \"This concludes " "57 hours of continuous video EEG monitoring.There are no electrographic seizures during this monitored period. The primary finding for this monitoring study is disorganized excessive diffuse theta and delta activities during wakefulness.\"    Relevant home meds:  Lamictal 200mg bid  Topiramate 100mg bid    Hospital AEDs:  -Vimpat 200mg bid started 1/8 (no dose 1/8 pm due to npo); 400mg iv x1 1/9. Decreased to 150mg bid 1/12 secondary to elevated level. Since discontinued.  -Topiramate 100mg BID  -Lamotrigine 150mg BID    Neurology reconsulted on 3/13/2024 as patient reportedly following commands prior to paralyzation/sedation on 3/11.  When given brief sedation holidays the evening of 3/12 and again in the morning of 3/13, patient opening her eyes but not following any commands or moving any extremities.  Patient examined while on sedation but able to rouse slightly to the side of her 's voice.  Patient did not follow any commands.  She is currently on cefepime.  Patient had wound dehiscence of her abdomen with stool leaking into wound.  Has been trached due to tenuous respiratory status.    Recommendations:  - CTH pending. Recommend MRI brain.  - Consider repeat LP.  - Seizure precautions; administer Ativan prn convulsive seizure-like activity >1min    - Maintain telemetry   - Continue to monitor and notify Neurology with any changes.  - Medical management and correction of any metabolic or infectious disturbances per ICU. Defer antibiotics/antivirals to ID          Recommendations for outpatient neurological follow up have yet to be determined.    Subjective:   Per discussion with nursing and with primary team, patient reportedly following simple commands 2 days ago prior to sedation/paralyzation.  She was given brief sedation holiday late last evening and this morning and reportedly became tachypneic.  She would open her eyes but would not follow commands.  She is now back on sedation.  She unfortunately " "has had wound dehiscence of her abdomen and per wound care, there is stool leaking into her abdominal incision.  CT head on 3/9 demonstrated no acute changes.  Patient still on cefepime.  Completed 5-day course of IVIG and currently on methylprednisolone 60 mg IV every 6 hours.  She has had increased O2 requirements as well as vasopressor requirements.  Currently trached.    ROS: unable to query 2/2 trach/sedation.    Vitals: Blood pressure 146/60, pulse 97, temperature 98.3 °F (36.8 °C), temperature source Axillary, resp. rate (!) 28, height 5' 8\" (1.727 m), weight 69.1 kg (152 lb 5.4 oz), SpO2 92%.,Body mass index is 23.16 kg/m².    Physical Exam: Limited 2/2 sedation/trach  General:  Patient is well-developed, well-nourished, and in no acute distress.  HEENT:  Head normocephalic.  Eyes anicteric.    Cardiovascular:  With regular rhythm.  Lungs: Trach in place.  Extremities:  Edema noted x4 extremities.  Skin: No rashes.  Neurologic:  Patient rouses slightly to her 's voice and attempts to open eyes (currently on propofol and fentanyl).  Does not follow any commands.    Cranial Nerves:   II: No blink to threat.  Pupils equal, round, sluggishly reactive to light with normal accomodation.  Cannot visualize optic fundi.  III,IV,VI: Restricted VORs.  V: Corneal reflexes intact bilaterally.  VII: Face is symmetric with no weakness noted.     Coordination: Unable to test.    Motor testing very limited; with some finger flexion to sternal rub.  Otherwise no movement.    No adventitious movements observed.    Toe responses are mute bilaterally.    Lab, Imaging and other studies: CBC:   Results from last 7 days   Lab Units 03/13/24  0449 03/13/24  0210 03/12/24  0758   WBC Thousand/uL 8.87 8.01 9.71   RBC Million/uL 2.48* 2.54* 2.57*   HEMOGLOBIN g/dL 7.4* 7.7* 7.7*   HEMATOCRIT % 22.1* 23.1* 23.6*   MCV fL 89 91 92   PLATELETS Thousands/uL 163 159 179   , BMP/CMP:   Results from last 7 days   Lab Units " 03/13/24  0449 03/12/24  1233 03/12/24  0401 03/10/24  1806 03/10/24  0513   SODIUM mmol/L 144 143 145   < > 148*   POTASSIUM mmol/L 4.0 5.0 3.0*   < > 4.0   CHLORIDE mmol/L 114* 113* 111*   < > 112*   CO2 mmol/L 19* 23 23   < > 24   BUN mg/dL 53* 44* 41*   < > 38*   CREATININE mg/dL 1.28 0.96 0.90   < > 0.59*   CALCIUM mg/dL 7.7* 7.5* 7.9*   < > 8.7   AST U/L  --   --   --   --  18   ALT U/L  --   --   --   --  31   ALK PHOS U/L  --   --   --   --  90   EGFR ml/min/1.73sq m 46 65 70   < > 101    < > = values in this interval not displayed.     VTE Prophylaxis: Enoxaparin (Lovenox)    Counseling / Coordination of Care  I have spent a total time of 45 minutes on 03/13/24 in caring for this patient including Diagnostic results, Impressions, Counseling / Coordination of care, Documenting in the medical record, Reviewing / ordering tests, medicine, procedures  , Obtaining or reviewing history  , and Communicating with other healthcare professionals .

## 2024-03-13 NOTE — OCCUPATIONAL THERAPY NOTE
Occupational Therapy Cancel Note         Patient Name: Carla Hunt  Today's Date: 3/13/2024         03/13/24 0839   OT Last Visit   OT Visit Date 03/13/24   Note Type   Note type Cancelled Session   Cancel Reasons Intubated/sedated     OT orders received. Chart reviewed. Pt with new trach placement 3/12, currently on vent. Plan for VAC placement today per chart. Will hold and see pt as appropriate and able.     Tanika Gutierrez MS, OTR/L

## 2024-03-13 NOTE — PROGRESS NOTES
GENERAL SURGERY QUICK NOTE    Went to place steri strips on minor wound dehiscence. Large wound dehiscence appreciated. Red/brown aspirate appreciated. Fascia intact. Will continue with wet to dry dressings. Will talk to AM team about next steps in wound care. Most likely wound manager type system given how close ostomy is to the midline incision. See the attached image for visual of the wound:        Lc Stewart MD  General Surgery

## 2024-03-14 LAB
ANION GAP SERPL CALCULATED.3IONS-SCNC: 14 MMOL/L (ref 4–13)
BACTERIA BRONCH AEROBE CULT: ABNORMAL
BASE EXCESS BLDA CALC-SCNC: -5.1 MMOL/L
BASE EXCESS BLDA CALC-SCNC: -6.4 MMOL/L
BASE EXCESS BLDA CALC-SCNC: -6.6 MMOL/L
BUN SERPL-MCNC: 59 MG/DL (ref 5–25)
CA-I BLD-SCNC: 1.12 MMOL/L (ref 1.12–1.32)
CALCIUM SERPL-MCNC: 7.7 MG/DL (ref 8.4–10.2)
CHLORIDE SERPL-SCNC: 112 MMOL/L (ref 96–108)
CO2 SERPL-SCNC: 19 MMOL/L (ref 21–32)
CREAT SERPL-MCNC: 1.55 MG/DL (ref 0.6–1.3)
CRP SERPL QL: 32.3 MG/L
ERYTHROCYTE [DISTWIDTH] IN BLOOD BY AUTOMATED COUNT: 20.9 % (ref 11.6–15.1)
GFR SERPL CREATININE-BSD FRML MDRD: 36 ML/MIN/1.73SQ M
GLUCOSE SERPL-MCNC: 102 MG/DL (ref 65–140)
GLUCOSE SERPL-MCNC: 102 MG/DL (ref 65–140)
GLUCOSE SERPL-MCNC: 141 MG/DL (ref 65–140)
GLUCOSE SERPL-MCNC: 141 MG/DL (ref 65–140)
GLUCOSE SERPL-MCNC: 148 MG/DL (ref 65–140)
GLUCOSE SERPL-MCNC: 151 MG/DL (ref 65–140)
GLUCOSE SERPL-MCNC: 152 MG/DL (ref 65–140)
GLUCOSE SERPL-MCNC: 153 MG/DL (ref 65–140)
GLUCOSE SERPL-MCNC: 179 MG/DL (ref 65–140)
GLUCOSE SERPL-MCNC: 181 MG/DL (ref 65–140)
GLUCOSE SERPL-MCNC: 186 MG/DL (ref 65–140)
GLUCOSE SERPL-MCNC: 186 MG/DL (ref 65–140)
GLUCOSE SERPL-MCNC: 202 MG/DL (ref 65–140)
GRAM STN SPEC: ABNORMAL
HCO3 BLDA-SCNC: 18.6 MMOL/L (ref 22–28)
HCO3 BLDA-SCNC: 18.7 MMOL/L (ref 22–28)
HCO3 BLDA-SCNC: 19.6 MMOL/L (ref 22–28)
HCT VFR BLD AUTO: 22.5 % (ref 34.8–46.1)
HGB BLD-MCNC: 7.4 G/DL (ref 11.5–15.4)
HOROWITZ INDEX BLDA+IHG-RTO: 40 MM[HG]
MAGNESIUM SERPL-MCNC: 2 MG/DL (ref 1.9–2.7)
MCH RBC QN AUTO: 30.1 PG (ref 26.8–34.3)
MCHC RBC AUTO-ENTMCNC: 32.9 G/DL (ref 31.4–37.4)
MCV RBC AUTO: 92 FL (ref 82–98)
O2 CT BLDA-SCNC: 10.7 ML/DL (ref 16–23)
O2 CT BLDA-SCNC: 10.9 ML/DL (ref 16–23)
O2 CT BLDA-SCNC: 11.2 ML/DL (ref 16–23)
OXYHGB MFR BLDA: 93.6 % (ref 94–97)
OXYHGB MFR BLDA: 95.6 % (ref 94–97)
OXYHGB MFR BLDA: 96.2 % (ref 94–97)
P JIROVECII AG SPEC QL IF: NORMAL
PCO2 BLDA: 34.3 MM HG (ref 36–44)
PCO2 BLDA: 34.8 MM HG (ref 36–44)
PCO2 BLDA: 36.3 MM HG (ref 36–44)
PEEP RESPIRATORY: 6 CM[H2O]
PH BLDA: 7.33 [PH] (ref 7.35–7.45)
PH BLDA: 7.35 [PH] (ref 7.35–7.45)
PH BLDA: 7.37 [PH] (ref 7.35–7.45)
PHOSPHATE SERPL-MCNC: 4 MG/DL (ref 2.7–4.5)
PHOSPHATE SERPL-MCNC: 4 MG/DL (ref 2.7–4.5)
PLATELET # BLD AUTO: 191 THOUSANDS/UL (ref 149–390)
PMV BLD AUTO: 12.3 FL (ref 8.9–12.7)
PO2 BLDA: 73.4 MM HG (ref 75–129)
PO2 BLDA: 89.9 MM HG (ref 75–129)
PO2 BLDA: 90.7 MM HG (ref 75–129)
POTASSIUM SERPL-SCNC: 3.2 MMOL/L (ref 3.5–5.3)
PRESSURE CONTROL: 16
RBC # BLD AUTO: 2.46 MILLION/UL (ref 3.81–5.12)
SODIUM SERPL-SCNC: 145 MMOL/L (ref 135–147)
SPECIMEN SOURCE: ABNORMAL
VANCOMYCIN SERPL-MCNC: 23.7 UG/ML (ref 10–20)
VENT AC: 28
VENT- AC: AC
WBC # BLD AUTO: 10.46 THOUSAND/UL (ref 4.31–10.16)

## 2024-03-14 PROCEDURE — 97530 THERAPEUTIC ACTIVITIES: CPT

## 2024-03-14 PROCEDURE — 82330 ASSAY OF CALCIUM: CPT

## 2024-03-14 PROCEDURE — 99232 SBSQ HOSP IP/OBS MODERATE 35: CPT | Performed by: PHYSICIAN ASSISTANT

## 2024-03-14 PROCEDURE — 80048 BASIC METABOLIC PNL TOTAL CA: CPT | Performed by: STUDENT IN AN ORGANIZED HEALTH CARE EDUCATION/TRAINING PROGRAM

## 2024-03-14 PROCEDURE — 97110 THERAPEUTIC EXERCISES: CPT

## 2024-03-14 PROCEDURE — 99233 SBSQ HOSP IP/OBS HIGH 50: CPT | Performed by: STUDENT IN AN ORGANIZED HEALTH CARE EDUCATION/TRAINING PROGRAM

## 2024-03-14 PROCEDURE — 85027 COMPLETE CBC AUTOMATED: CPT | Performed by: STUDENT IN AN ORGANIZED HEALTH CARE EDUCATION/TRAINING PROGRAM

## 2024-03-14 PROCEDURE — 93308 TTE F-UP OR LMTD: CPT | Performed by: INTERNAL MEDICINE

## 2024-03-14 PROCEDURE — 86140 C-REACTIVE PROTEIN: CPT | Performed by: STUDENT IN AN ORGANIZED HEALTH CARE EDUCATION/TRAINING PROGRAM

## 2024-03-14 PROCEDURE — 83735 ASSAY OF MAGNESIUM: CPT | Performed by: STUDENT IN AN ORGANIZED HEALTH CARE EDUCATION/TRAINING PROGRAM

## 2024-03-14 PROCEDURE — 82948 REAGENT STRIP/BLOOD GLUCOSE: CPT

## 2024-03-14 PROCEDURE — 94760 N-INVAS EAR/PLS OXIMETRY 1: CPT

## 2024-03-14 PROCEDURE — 94664 DEMO&/EVAL PT USE INHALER: CPT

## 2024-03-14 PROCEDURE — 94640 AIRWAY INHALATION TREATMENT: CPT

## 2024-03-14 PROCEDURE — 94003 VENT MGMT INPAT SUBQ DAY: CPT

## 2024-03-14 PROCEDURE — 80202 ASSAY OF VANCOMYCIN: CPT | Performed by: INTERNAL MEDICINE

## 2024-03-14 PROCEDURE — 82805 BLOOD GASES W/O2 SATURATION: CPT

## 2024-03-14 PROCEDURE — 84100 ASSAY OF PHOSPHORUS: CPT

## 2024-03-14 RX ORDER — MIDAZOLAM HYDROCHLORIDE 2 MG/2ML
2 INJECTION, SOLUTION INTRAMUSCULAR; INTRAVENOUS ONCE
Status: COMPLETED | OUTPATIENT
Start: 2024-03-14 | End: 2024-03-14

## 2024-03-14 RX ORDER — POTASSIUM CHLORIDE 29.8 MG/ML
40 INJECTION INTRAVENOUS ONCE
Status: COMPLETED | OUTPATIENT
Start: 2024-03-14 | End: 2024-03-15

## 2024-03-14 RX ORDER — FENTANYL CITRATE 50 UG/ML
50 INJECTION, SOLUTION INTRAMUSCULAR; INTRAVENOUS
Status: DISCONTINUED | OUTPATIENT
Start: 2024-03-14 | End: 2024-03-26

## 2024-03-14 RX ORDER — HYDROMORPHONE HYDROCHLORIDE 2 MG/ML
2 INJECTION, SOLUTION INTRAMUSCULAR; INTRAVENOUS; SUBCUTANEOUS ONCE
Status: COMPLETED | OUTPATIENT
Start: 2024-03-14 | End: 2024-03-14

## 2024-03-14 RX ORDER — SODIUM CHLORIDE, SODIUM GLUCONATE, SODIUM ACETATE, POTASSIUM CHLORIDE, MAGNESIUM CHLORIDE, SODIUM PHOSPHATE, DIBASIC, AND POTASSIUM PHOSPHATE .53; .5; .37; .037; .03; .012; .00082 G/100ML; G/100ML; G/100ML; G/100ML; G/100ML; G/100ML; G/100ML
500 INJECTION, SOLUTION INTRAVENOUS ONCE
Status: COMPLETED | OUTPATIENT
Start: 2024-03-14 | End: 2024-03-15

## 2024-03-14 RX ORDER — FENTANYL CITRATE 50 UG/ML
100 INJECTION, SOLUTION INTRAMUSCULAR; INTRAVENOUS
Status: DISCONTINUED | OUTPATIENT
Start: 2024-03-14 | End: 2024-03-14

## 2024-03-14 RX ADMIN — FENTANYL CITRATE 50 MCG: 50 INJECTION INTRAMUSCULAR; INTRAVENOUS at 22:40

## 2024-03-14 RX ADMIN — LEVALBUTEROL HYDROCHLORIDE 1.25 MG: 1.25 SOLUTION RESPIRATORY (INHALATION) at 13:46

## 2024-03-14 RX ADMIN — SULFAMETHOXAZOLE AND TRIMETHOPRIM 2 TABLET: 800; 160 TABLET ORAL at 12:34

## 2024-03-14 RX ADMIN — METHYLPREDNISOLONE SODIUM SUCCINATE 50 MG: 125 INJECTION, POWDER, FOR SOLUTION INTRAMUSCULAR; INTRAVENOUS at 05:50

## 2024-03-14 RX ADMIN — METHYLPREDNISOLONE SODIUM SUCCINATE 50 MG: 125 INJECTION, POWDER, FOR SOLUTION INTRAMUSCULAR; INTRAVENOUS at 23:13

## 2024-03-14 RX ADMIN — NYSTATIN: 100000 POWDER TOPICAL at 08:39

## 2024-03-14 RX ADMIN — LAMOTRIGINE 150 MG: 100 TABLET ORAL at 08:38

## 2024-03-14 RX ADMIN — SODIUM CHLORIDE, SODIUM GLUCONATE, SODIUM ACETATE, POTASSIUM CHLORIDE, MAGNESIUM CHLORIDE, SODIUM PHOSPHATE, DIBASIC, AND POTASSIUM PHOSPHATE 500 ML: .53; .5; .37; .037; .03; .012; .00082 INJECTION, SOLUTION INTRAVENOUS at 07:52

## 2024-03-14 RX ADMIN — LEVALBUTEROL HYDROCHLORIDE 1.25 MG: 1.25 SOLUTION RESPIRATORY (INHALATION) at 19:21

## 2024-03-14 RX ADMIN — POTASSIUM CHLORIDE 40 MEQ: 29.8 INJECTION, SOLUTION INTRAVENOUS at 07:53

## 2024-03-14 RX ADMIN — MIDAZOLAM 2 MG: 1 INJECTION INTRAMUSCULAR; INTRAVENOUS at 03:23

## 2024-03-14 RX ADMIN — SODIUM CHLORIDE, SODIUM GLUCONATE, SODIUM ACETATE, POTASSIUM CHLORIDE, MAGNESIUM CHLORIDE, SODIUM PHOSPHATE, DIBASIC, AND POTASSIUM PHOSPHATE 500 ML: .53; .5; .37; .037; .03; .012; .00082 INJECTION, SOLUTION INTRAVENOUS at 11:36

## 2024-03-14 RX ADMIN — ALTEPLASE 2 MG: 2.2 INJECTION, POWDER, LYOPHILIZED, FOR SOLUTION INTRAVENOUS at 23:22

## 2024-03-14 RX ADMIN — POLYETHYLENE GLYCOL 3350 17 G: 17 POWDER, FOR SOLUTION ORAL at 08:38

## 2024-03-14 RX ADMIN — PROPOFOL 50 MCG/KG/MIN: 10 INJECTION, EMULSION INTRAVENOUS at 03:05

## 2024-03-14 RX ADMIN — ENOXAPARIN SODIUM 40 MG: 40 INJECTION SUBCUTANEOUS at 16:02

## 2024-03-14 RX ADMIN — METHYLPREDNISOLONE SODIUM SUCCINATE 50 MG: 125 INJECTION, POWDER, FOR SOLUTION INTRAMUSCULAR; INTRAVENOUS at 18:01

## 2024-03-14 RX ADMIN — TOPIRAMATE 100 MG: 100 TABLET, FILM COATED ORAL at 18:01

## 2024-03-14 RX ADMIN — METHYLPREDNISOLONE SODIUM SUCCINATE 50 MG: 125 INJECTION, POWDER, FOR SOLUTION INTRAMUSCULAR; INTRAVENOUS at 11:28

## 2024-03-14 RX ADMIN — PROPOFOL 40 MCG/KG/MIN: 10 INJECTION, EMULSION INTRAVENOUS at 05:49

## 2024-03-14 RX ADMIN — Medication 50 MCG/HR: at 05:49

## 2024-03-14 RX ADMIN — FENTANYL CITRATE 100 MCG: 50 INJECTION INTRAMUSCULAR; INTRAVENOUS at 06:25

## 2024-03-14 RX ADMIN — REMDESIVIR 100 MG: 100 INJECTION, POWDER, LYOPHILIZED, FOR SOLUTION INTRAVENOUS at 15:23

## 2024-03-14 RX ADMIN — LAMOTRIGINE 150 MG: 100 TABLET ORAL at 18:01

## 2024-03-14 RX ADMIN — TOPIRAMATE 100 MG: 100 TABLET, FILM COATED ORAL at 08:38

## 2024-03-14 RX ADMIN — Medication 2 SPRAY: at 14:31

## 2024-03-14 RX ADMIN — IPRATROPIUM BROMIDE 0.5 MG: 0.5 SOLUTION RESPIRATORY (INHALATION) at 07:23

## 2024-03-14 RX ADMIN — IPRATROPIUM BROMIDE 0.5 MG: 0.5 SOLUTION RESPIRATORY (INHALATION) at 19:21

## 2024-03-14 RX ADMIN — Medication 2 SPRAY: at 22:28

## 2024-03-14 RX ADMIN — SULFAMETHOXAZOLE AND TRIMETHOPRIM 2 TABLET: 800; 160 TABLET ORAL at 22:27

## 2024-03-14 RX ADMIN — LEVALBUTEROL HYDROCHLORIDE 1.25 MG: 1.25 SOLUTION RESPIRATORY (INHALATION) at 07:23

## 2024-03-14 RX ADMIN — NYSTATIN: 100000 POWDER TOPICAL at 18:01

## 2024-03-14 RX ADMIN — NIRMATRELVIR AND RITONAVIR 3 TABLET: KIT at 08:39

## 2024-03-14 RX ADMIN — Medication 2 SPRAY: at 01:51

## 2024-03-14 RX ADMIN — CHLORHEXIDINE GLUCONATE 0.12% ORAL RINSE 15 ML: 1.2 LIQUID ORAL at 08:37

## 2024-03-14 RX ADMIN — VENLAFAXINE 25 MG: 25 TABLET ORAL at 08:41

## 2024-03-14 RX ADMIN — Medication 2 SPRAY: at 05:44

## 2024-03-14 RX ADMIN — Medication 2 SPRAY: at 18:01

## 2024-03-14 RX ADMIN — SODIUM CHLORIDE 9 UNITS/HR: 9 INJECTION, SOLUTION INTRAVENOUS at 16:19

## 2024-03-14 RX ADMIN — NIRMATRELVIR AND RITONAVIR 3 TABLET: KIT at 18:01

## 2024-03-14 RX ADMIN — Medication 2 SPRAY: at 11:37

## 2024-03-14 RX ADMIN — CHLORHEXIDINE GLUCONATE 0.12% ORAL RINSE 15 ML: 1.2 LIQUID ORAL at 22:27

## 2024-03-14 RX ADMIN — IPRATROPIUM BROMIDE 0.5 MG: 0.5 SOLUTION RESPIRATORY (INHALATION) at 13:46

## 2024-03-14 RX ADMIN — HYDROMORPHONE HYDROCHLORIDE 2 MG: 2 INJECTION, SOLUTION INTRAMUSCULAR; INTRAVENOUS; SUBCUTANEOUS at 01:51

## 2024-03-14 NOTE — OCCUPATIONAL THERAPY NOTE
Occupational Therapy Progress Note     Patient Name: Carla Hunt  Today's Date: 3/14/2024  Problem List  Principal Problem:    Acute respiratory failure with hypoxia (HCC)  Active Problems:    Anxiety state    Encephalopathy    COVID    Stage 3b chronic kidney disease (CKD) (HCC)    Teto marginal zone B-cell lymphoma (HCC)    Seizure disorder (HCC)    Hypotension    Palliative care patient    Goals of care, counseling/discussion    Acute kidney injury (HCC)    Cecal volvulus (HCC)          03/14/24 1108   OT Last Visit   OT Visit Date 03/14/24   Note Type   Note Type Treatment   Pain Assessment   Pain Assessment Tool FLACC   Pain Rating: FLACC (Rest) - Face 0   Pain Rating: FLACC (Rest) - Legs 0   Pain Rating: FLACC (Rest) - Activity 0   Pain Rating: FLACC (Rest) - Cry 0   Pain Rating: FLACC (Rest) - Consolability 0   Score: FLACC (Rest) 0   Pain Rating: FLACC (Activity) - Face 0   Pain Rating: FLACC (Activity) - Legs 0   Pain Rating: FLACC (Activity) - Activity 0   Pain Rating: FLACC (Activity) - Cry 0   Pain Rating: FLACC (Activity) - Consolability 0   Score: FLACC (Activity) 0   Restrictions/Precautions   Weight Bearing Precautions Per Order No   Braces or Orthoses   (B/L prevalon boots)   Other Precautions Cognitive;Chair Alarm;Bed Alarm;Contact/isolation;Airborne/isolation;Multiple lines;Telemetry;O2;Fall Risk  (trach to vent, A-line, NGT, VAC)   Lifestyle   Autonomy I adls and mobility - i iadls - shares homemaking with family   Reciprocal Relationships supportive family   Service to Others volunteers   ADL   Grooming Assistance 1  Total Assistance   Grooming Deficit Wash/dry face   Grooming Comments performed using hand over hand to incorporate RUE into task   Bed Mobility   Supine to Sit 1  Dependent   Additional Comments pt able to tolerate moving bed into chair position, done in increments to ensure tolerance at each level. Pt tolerated full chair position for ~ 15 minutes with VSS   Therapeutic  Exercise - ROM   UE-ROM Yes   ROM- Right Upper Extremities   R Shoulder PROM;Flexion;Extension   R Elbow PROM;Elbow flexion;Elbow extension   R Wrist Prolonged stretch;Wrist flexion  (PROM supination/pronation)   R Weight/Reps/Sets x10   RUE ROM Comment provided rolled up washcloths to position hand in proper resting position - requested resting hand splint from resident   ROM - Left Upper Extremities    L Shoulder PROM;Flexion;Extension   L Elbow PROM;Elbow flexion;Elbow extension   L Wrist Prolonged stretch;Wrist flexion   LUE ROM Comment provided rolled up washcloths to position hand in proper resting position - requested resting hand splint from resident   Cognition   Overall Cognitive Status Impaired   Arousal/Participation Lethargic   Attention WILL   Orientation Level Unable to assess   Memory Unable to assess   Following Commands Unable to follow one step commands   Comments Pt lightly sedated but arousable. Able to spontaneously open eyes t/o session, reacts to stimuli and when her name is called. Provided auditory and tactile stimuli t/o   Activity Tolerance   Activity Tolerance Patient limited by fatigue   Medical Staff Made Aware PT Tania, SPT Piper. Cleared for session by RN, Donna   Assessment   Assessment Patient participated in Skilled OT session this date with interventions consisting of therapeutic exercise to: increase functional use of BUEs, increase BUE muscle strength ,  therapeutic activities to: increase activity tolerance, and increase sitting tolerance. Performed B/L UE ROM with G tolerance with bed in chair position. Pt able to tolerate sitting in full chair position for ~ 15 minutes with VSS. Pt opens eyes spontaneously, opens to stimuli, and opens to her name. Provided tactile and auditory input t/o. Performed face washing dependently using RUE and hand over hand to engage RUE muscles and provide tactile input. Pt tolerated all activities. Placed pillows and wedges for proper  positioning and offloading. Also provided rolled up wash clothes to simulate proper resting position - discussed resting hand splints with resident. Patient continues to be functioning below baseline level, occupational performance remains limited secondary to factors listed above and increased risk for falls and injury.   From OT standpoint, recommendation at time of d/c would be STR.  Patient to benefit from continued Occupational Therapy treatment while in the hospital to address deficits as defined above and maximize level of functional independence with ADLs and functional mobility. Pt was left after session with all current needs met. The patient's raw score on the AM-PAC Daily Activity Inpatient Short Form is 6. A raw score of less than 19 suggests the patient may benefit from discharge to post-acute rehabilitation services. Please refer to the recommendation of the Occupational Therapist for safe discharge planning.   Plan   Treatment Interventions ADL retraining;Functional transfer training;UE strengthening/ROM;Endurance training;Cognitive reorientation;Patient/family training;Compensatory technique education;UE splinting;Continued evaluation;Energy conservation;Activityengagement   Goal Expiration Date 03/19/24   OT Treatment Day 9   OT Frequency 2-3x/wk   Discharge Recommendation   Rehab Resource Intensity Level, OT II (Moderate Resource Intensity)   AM-PAC Daily Activity Inpatient   Lower Body Dressing 1   Bathing 1   Toileting 1   Upper Body Dressing 1   Grooming 1   Eating 1   Daily Activity Raw Score 6   Turning Head Towards Sound 1   Follow Simple Instructions 1   Low Function Daily Activity Raw Score 8   Low Function Daily Activity Standardized Score  12.06   AM-PAC Applied Cognition Inpatient   Following a Speech/Presentation 1   Understanding Ordinary Conversation 1   Taking Medications 1   Remembering Where Things Are Placed or Put Away 1   Remembering List of 4-5 Errands 1   Taking Care of  Complicated Tasks 1   Applied Cognition Raw Score 6   Applied Cognition Standardized Score 7.69     Tanika Gutierrez MS, OTR/L

## 2024-03-14 NOTE — PROGRESS NOTES
[facility]  Progress Note: Critical Care  Name: Carla Hunt 59 y.o. 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  Midline incision with wound dehiscence 2/2 poor wound healing s/p wound vac 3/13  ELIESER on CKD3  Anemia chronic inflammation  History of B-Cell lymphoma, Rituxan maintenance therapy on hold  Minimal SAH POA, no interventions required  Seizure disorder; on home AEDs  Steroid induced hyperglycemia; on insulin gtt   Elevated TG  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   vEEG 1/10-12 negative for seizure like activity   Only opens eyes to voice only and not following commands on frequent neuro checks despite prior sedation holiday after extubation 3/12, new from prior to intubation 3/11. PERRL  CTH 3/13 negative for acute intracranial findings.     Plan:  MRI brain w wo contrast to evaluate for encephalitis/inflammatory process   Frequent neurochecks q1h  Avoid PRN fentanyl/sedative meds as able   Continue home AEDs as below  Can consider LP if further clincally indicated pending workup  Video EEG deferred at thist time due to low suspicion for seizures    Diagnosis: Analgesia  PRN Fentanyl 50mcg/hr      Diagnosis: seizure disorder  S/p  partial left temporal lobe resection in 2007  On Lamictal 150 bid and Topamax 100 bid  Last lamotrigine level from 03/02 at 10.7 (therapeutic)     Diagnosis: Anxiety  On Effexor 25 mg QD     CV:   Diagnosis: Shock  Likely mixed distributive and hypovolemic  TTE 2/20 with EF 70%, no WMA, no significant valvular dysfunction, G1 DD.  Currently on Levophed at 1mcg.min  Plan:  Wean Levophed as able  Maintain MAPs > 65 mmHg      Pulm:  Diagnosis: Acute hypoxic respiratory failure   In setting of severe COVID, POA, persistently positive since admission.  Likely multifactorial due to COVID pneumonitis in setting of persistent COVID-19 infection and possible recurrent bacterial pneumonia.Persistent inflammation and fibrosis also likely given patient has been steroid responsive throughout admission.  Previously completed course of remdesivir/dexamethasone/Actemra.  Resumed Paxlovid and remdesivir after COVID PCR positive on 3/3. Has had multiple bronchoscopies throughout admission (2/2, 2/16, 2/21) subsequent Legionella/viral/fungal/AFB culture negative.    Previously intubated 2/13-3/1.  Reintubated 3/11 due to increased WOB in setting of elevated CRP; Mucopurulent secretions noted. Repeat BAL studies obtained 3/11.   S/P tracheostomy 3/12  CRP continues downward trend, most recent CRP 32, compared to 73.6 prior  Repeat CXR 3/12 with grossly unchanged diffuse parenchymal disease   Plan:  Continue Paxlovid/remdesivir through 3/15 to complete 14d course to tx COVID-19 infection  Vanco/Cefepime now stopped, appreciate pharm input for vanco dosing  Continue Bactrim 2 DS tabs bid crushed via NGT to tx Stenotrophomonas   Follow-up BAL Cx's and studies including CD4/CD8 count, leukemia/lymphoma panel,PCP pcr  IV Solu-Medrol 50 mg q6h. Plan for slow steroid wean by 10mg every 3d until patient is down to 10mg q6h and then start decreasing frequency till patient is off  Will consider starting steroid sparing therapy in consultation  with pharmacy if patient able to tolerate steroid wean  Trend CRP daily to guide steroid therapy  ID following, recommend broadening antibiotic coverage from cefepime to meropenem 1 g IV Q8h if patient clinically deteriorates with concerns of progressive sepsis     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  Complicated by poor wound healing of midline incision as evidenced by wound dehiscence s/p wound vac   Stoma with dark brown output, consistent with prior  Plan:  Monitor output of ostomy pouch and Hemoglobin  Surgery following, will keep them updated if any further changes  Tube feeds running     :   Diagnosis: ELIESER on CKD III  BUN/Cr 59/1.55 today (baseline Cr appears to be 0.8-1.2), IVC collapsed on bedside US. Suggestive of prerenal.   UO adequate on Ortiz, draining clear yellow urine  Plan:  IVF 500cc isolyte bolus  Continue to monitor I/O and UOP     F/E/N:   F: 500cc Isolyte today  E: monitor and replete for goal K>4, P>3, Mg>2; Potassium repleted today  N: On tube feeds with vital 1.5; 35 cc/h, Prosource liquid protein to 1 packet BID     Heme/Onc:   Diagnosis: Anemia  Hgb recently has been around 7-8.  Transfused 2U on 3/13 due to Hgb <6.  Total of 6U transfused since admission  Likely multifactorial in nature, acute versus early nearly chronic in the setting of inflammatory state as evidenced by ferritin 974, likely further complicated by chronic anemia due to her other history of lymphoma and CKD  Plan:  Trend CBC, transfuse to maintain Hgb>7     Diagnosis: B cell lymphoma  Follows with heme/onc at [facility]  S/P 6 cycles of chemotherapy   Maintained on Rituxan for 2 year course PTA, started May 2022  Last dose was 12/20, treatment currently on hold   Leukopenic on admission but WBC now limited likely in setting of steroid therapy     Plan:  Holding rituximab in setting of persistent COVID-19 infection      DVT ppx with lovenox     Endo:   Diagnosis: steroid  hyperglycemia and diabetes mellitus type 2, A1c at 6.6 from 01/2024  Goal -180  BG range last 24hrs 140-200s  Plan:   On insulin gtt        ID:   Diagnosis: Recurrent bacterial pneumonia  Patient has completed multiple treatment courses of remdesivir throughout hospitalization in addition to 7 days of ceftriaxone.   BAL cultures in 2/2024 positive for MDR Pseudomonas and stenotrophomonas treated with 10d course of Levaquin  CT C/A/P 3/10 with persistent groundglass opacities and changes suggestive of sequelae of COVID, prior infections.    Repeat BAL cultures from 3/11 showing 4+ growth Stenotrophomonas maltophilia. Could be colonization given prior BAL growth of same, however due to recent intubation with prolonged corticosteroid theraphy, will begin treating as true pathogen. Patient otherwise remains afebrile, no leukocytosis. CRP with down trending.   Plan:  Stop cefepime, vancomycin  Bactrim 2 DS tabs BID crushed via NGT  Follow up BAL cultures and sensitivities        MSK/Skin:   Diagnosis: Midline incision wound with poor wound healing  General surgery performed staple removal of the patient's midline incision on 3/12 with some skin signs appreciated at the inferior aspect of the wound without obvious pus drainage. Overnight 3/13, wound dehiscence progressed requiring general surgery reevaluation. Red/brown aspirate noted, fascia intact. Wet-to-dry dressings in place. General surgery following for next Epson wound care.   Suspect poor wound healing in setting of high-dose steroid therapy.  No  exam no obvious signs of infection at this time.    S/P wound vac placement 3/13 as per gen surgery. No active concerns for cellulitis.   Plan:   Routine monitoring, wound care as per general surgery.     Diagnosis: Critical illness myopathy  Likely exacerbated by high-dose steroid therapy  Plan:  Continue IV Solu-Medrol as above      Disposition: Critical care    ICU Core Measures     Vented Patient  VAP Bundle   "VAP bundle ordered     A: Assess, Prevent, and Manage Pain Has pain been assessed? Yes  Need for changes to pain regimen? No   B: Both Spontaneous Awakening Trials (SATs) and Spontaneous Breathing Trials (SBTs) Plan to perform spontaneous awakening trial today? Yes   Plan to perform spontaneous breathing trial today? Yes   Obvious barriers to extubation? NA   C: Choice of Sedation RASS Goal: -1 Drowsy or 0 Alert and Calm  Need for changes to sedation or analgesia regimen? No   D: Delirium CAM-ICU: Negative   E: Early Mobility  Plan for early mobility? Yes   F: Family Engagement Plan for family engagement today? Yes       Antibiotic Review: Patient on appropriate coverage based on culture data.  and Infectious disease consulted    Review of Invasive Devices:    Ortiz Plan: Continue for accurate I/O monitoring for 48 hours  Central access plan: Patient has multiple central venous catheters.   [place] Plan: Keep arterial line for hemodynamic monitoring and frequent ABGs    Prophylaxis:  VTE VTE covered by:  enoxaparin, Subcutaneous, 40 mg at 03/13/24 1622       Stress Ulcer  covered byfamotidine (PEPCID) tablet 20 mg [[id number]]        Significant 24hr Events     24hr events:    Was trialed off sedation on 3/13 with poor subsequent neuro exams. CTH negative. MRI brain ordered as per neuro. EEG deferred due to low suscpision for seizures. S/P wound vac by gen surg 3/13. Was noted to be tachypneic overnight into 40s. Trialed Propofol 50, Fentanyl 50, Dilaudid, and Versed with no effect. Noted to be tachypneic 40-50s this AM, lungs sound diminished. Tachypnea attributed to vent malfunction as per RT at bedside. Patient was briefly placed on bag mask, tolerated well. Vent was adjusted by RT and was placed back on prior PC settings of 16/6/28/40%, tolerating well without signs of respiratory distress.  Repeat ABG in one hour pending.  Neuro exam improved, now opens eyes to voice, was able to nod \"yes\" to 's command). " "     Subjective   Review of Systems   Unable to perform ROS: Acuity of condition        Objective                            Vitals I/O      Most Recent Min/Max in 24hrs   Temp 98 °F (36.7 °C) Temp  Min: 97.7 °F (36.5 °C)  Max: 98.3 °F (36.8 °C)   Pulse 80 Pulse  Min: 80  Max: 105   Resp (!) 41 Resp  Min: 24  Max: 42   BP (!) 120/49 BP  Min: 104/42  Max: 158/62   O2 Sat 98 % SpO2  Min: 90 %  Max: 98 %      Intake/Output Summary (Last 24 hours) at 3/14/2024 0625  Last data filed at 3/14/2024 0555  Gross per 24 hour   Intake 1948.42 ml   Output 2205 ml   Net -256.58 ml       Diet Enteral/Parenteral; Tube Feeding No Oral Diet; Vital 1.5; Continuous; 35; Prosource Protein Liquid - One Packet; BID; 300; Water; Every 6 hours    Invasive Monitoring   Arterial Line  Amelia /53  Arterial Line BP  Min: 97/50  Max: 143/61   MAP 68 mmHg  Arterial Line MAP (mmHg)  Min: 61 mmHg  Max: 82 mmHg           Physical Exam   Physical Exam  Eyes:      Pupils: Pupils are equal, round, and reactive to light.   Skin:     General: Skin is warm.      Coloration: Skin is not pale.   HENT:      Nose: Nasogastric tube present. No epistaxis.      Mouth/Throat:      Mouth: Mucous membranes are moist.   Neck:      Vascular: Central line present. No JVD.      Trachea: Tracheostomy present.   Cardiovascular:      Rate and Rhythm: Normal rate and regular rhythm.      Heart sounds: No murmur heard.  Musculoskeletal:         General: No swelling.      Right lower leg: No edema.      Left lower leg: No edema.   Abdominal: General: An ostomy site is present. There is ostomy site.There is no distension.      Palpations: Abdomen is soft.   Constitutional:       Appearance: She is ill-appearing.      Interventions: She is sedated.   Pulmonary:      Effort: Tachypnea present.      Breath sounds: No wheezing or rales.   Neurological:      General: No focal deficit present.      Comments: Only opens eyes to voice, doesn't track  Was able to nod \"yes\" to " simple command, not able to wiggle toes or  fingers   Genitourinary/Anorectal:  Ortiz present.          Diagnostic Studies      EKG: Sinus tachycardia  Poor R Wave Progression  Nonspecific ST and T wave abnormality  Abnormal ECG  Confirmed by Danis Clifford ([id number]) on 3/12/2024 6:10:39 AM  Imaging:  CXR 3/12 Patchy pulmonary infiltrates throughout much of the right lung. Left basilar infiltrate. Otherwise, the left lung appears improved in aeration from prior exam. Recommend continued follow-up    I have personally reviewed pertinent reports.       Medications:  Scheduled PRN   chlorhexidine, 15 mL, Q12H SARTHAK  enoxaparin, 40 mg, Q24H SARTHAK  famotidine, 20 mg, BID  ipratropium, 0.5 mg, TID  lamoTRIgine, 150 mg, BID  levalbuterol, 1.25 mg, TID  methylPREDNISolone sodium succinate, 50 mg, Q6H SARTHAK  nirmatrelvir & ritonavir, 3 tablet, BID  nystatin, , BID  polyethylene glycol, 17 g, Daily  propofol, 100 mg, Once  remdesivir, 100 mg, Q24H  sodium chloride, 2 spray, Q4H  sulfamethoxazole-trimethoprim, 2 tablet, Q12H SARTHAK  topiramate, 100 mg, BID  venlafaxine, 25 mg, Daily      acetaminophen, 650 mg, Q6H PRN  fentaNYL, 100 mcg, Q1H PRN  OLANZapine, 10 mg, HS PRN  ondansetron, 4 mg, Q6H PRN  oxyCODONE, 2.5 mg, Q4H PRN   Or  oxyCODONE, 5 mg, Q4H PRN  sodium chloride, 1 Application, Q1H PRN       Continuous    fentaNYL, 50 mcg/hr, Last Rate: 50 mcg/hr (03/14/24 0549)  insulin regular (HumuLIN R,NovoLIN R) 1 Units/mL in sodium chloride 0.9 % 100 mL infusion, 0.3-21 Units/hr, Last Rate: 6 Units/hr (03/14/24 0153)  norepinephrine, 1-30 mcg/min, Last Rate: 3 mcg/min (03/14/24 0552)  propofol, 5-50 mcg/kg/min, Last Rate: 40 mcg/kg/min (03/14/24 0549)         Labs:    CBC    Recent Labs     03/12/24  0758 03/13/24  0210 03/13/24  0449 03/14/24  0542   WBC 9.71   < > 8.87 10.46*   HGB 7.7*   < > 7.4* 7.4*   HCT 23.6*   < > 22.1* 22.5*      < > 163 191   BANDSPCT 14*  --   --   --     < > = values in this interval not displayed.      BMP    Recent Labs     03/12/24  1233 03/13/24  0449   SODIUM 143 144   K 5.0 4.0   * 114*   CO2 23 19*   AGAP 7 11   BUN 44* 53*   CREATININE 0.96 1.28   CALCIUM 7.5* 7.7*       Coags    No recent results     Additional Electrolytes  Recent Labs     03/13/24  0449 03/14/24  0542   MG 2.0  --    PHOS 3.9  --    CAIONIZED 1.12 1.12          Blood Gas    Recent Labs     03/14/24  0104   PHART 7.374   [id number] 34.3*   PO2ART 89.9   CSL5QIR 19.6*   BEART -5.1   SOURCE Line, Arterial     Recent Labs     03/14/24  0104   SOURCE Line, Arterial    LFTs  No recent results    Infectious  No recent results     Bronchial gram stain with 4+ Stenotrophomonas, 1+ budding yeast    AFB/fungal/viral bronchial Cx's pending  PCP smear (BAL) negative Glucose  Recent Labs     03/12/24  1233 03/13/24  0449   GLUC 106 123               Joe Lazcano, DO

## 2024-03-14 NOTE — PLAN OF CARE
Problem: PHYSICAL THERAPY ADULT  Goal: Performs mobility at highest level of function for planned discharge setting.  See evaluation for individualized goals.  Description:    Equipment Recommended:  (none)       See flowsheet documentation for full assessment, interventions and recommendations.  Outcome: Progressing  Note: Prognosis: Fair  Problem List: Decreased strength, Decreased range of motion, Decreased endurance, Impaired balance, Decreased mobility, Decreased cognition, Decreased coordination, Impaired judgement, Decreased safety awareness, Decreased skin integrity, Pain  Assessment: PT session focused on sitting tolerance and LE exercises. Pt did tolerate treatment well today and occasionally opened eyes (inconsistent w/ stimulus). Pt was able to perform a dependent supine to sit (bed moved into full chair position). Vitals remained stable for the 15 minutes of sitting in bed. Pt performed PROM LE exercises at each joint. Pt would continue to benefit from skilled PT. Pt will continue to be seen upon admission. Pt's prognosis is Fair secondary to age, PLOF, PMH, cognitive deficits, and medical complications during current admission. The patient's AM-PAC Basic Mobility Inpatient Standardized Score is less than 42.9, suggesting this patient may benefit from discharge to post-acute rehabilitation services. Please also refer to the recommendation of the Physical Therapist for safe discharge planning.  Barriers to Discharge: None     Rehab Resource Intensity Level, PT: I (Maximum Resource Intensity)    See flowsheet documentation for full assessment.

## 2024-03-14 NOTE — PROCEDURES
General Surgery  Carla Hunt 59 y.o. female MRN: [id number]  Unit/Bed#: [facility] 12 Encounter: [id number]     Wound Check Progress Note     Location of wound:   abdomen  See images below     All dressings removed. Midline incision with skin dehiscence. Fascia probed and intact. No fascial dehiscence present.     Skin dehiscence not unexpected in patient immunosuppressed on steroids and chemotherapy with overall poor nutritional status, critically ill with prolonged hospitalization.    Midline wound measures: 10 cm x 5 cm x 4 cm    The wound is clean. There is no estefany-skin cellulitis or concerns for abscess or infection. Her ostomy is viable and productive. No continued sloughing or bleeding. The ostomy was digitized and able to digitize two separate openings ABOVE the fascia: superior (mucus fistula) and inferior (end ileostomy).    Upon further inspection, small area of fat necrosis generating an opening from midline wound towards subcutaneous plane near ostomy (NOT INTO OSTOMY, area is confined to subcutaneous fat plane). Probed approximately 1cm size defect. In order to not disrupt the subcutaneous fat barrier between the area of ostomy and midline wound, a single small piece of white foam was inserted into the plane and sutured to a single piece of black foam and wound vac applied. The estefany wound skin was protected with tape and new ostomy appliance placed. The wound vac held appropriate suction to 125mmHg.     An additional piece of black foam was utilized as a bridge to seal the vac medially away from the ostomy.    Overall the wound appears clean and free from contamination. In order to further protect the midline wound from contamination, we will closely monitor wound with wound vac changes. Next wound vac change anticipated on Friday 3/15.    Please alert general surgery team for any concerns for feculent wound vac output for evaluation/examination.    All of the above was discussed with General Surgery  team, attending, nursing, and patient's spouse.        Images were taken and inserted into the patient's chart.        Images:            Nancy Pond MD  3/13/2024

## 2024-03-14 NOTE — PLAN OF CARE
Problem: Prexisting or High Potential for Compromised Skin Integrity  Goal: Skin integrity is maintained or improved  Description: INTERVENTIONS:  - Identify patients at risk for skin breakdown  - Assess and monitor skin integrity  - Assess and monitor nutrition and hydration status  - Monitor labs   - Assess for incontinence   - Turn and reposition patient  - Assist with mobility/ambulation  - Relieve pressure over bony prominences  - Avoid friction and shearing  - Provide appropriate hygiene as needed including keeping skin clean and dry  - Evaluate need for skin moisturizer/barrier cream  - Collaborate with interdisciplinary team   - Patient/family teaching  - Consider wound care consult   Outcome: Progressing     Problem: INFECTION - ADULT  Goal: Absence or prevention of progression during hospitalization  Description: INTERVENTIONS:  - Assess and monitor for signs and symptoms of infection  - Monitor lab/diagnostic results  - Monitor all insertion sites, i.e. indwelling lines, tubes, and drains  - Monitor endotracheal if appropriate and nasal secretions for changes in amount and color  - [place] appropriate cooling/warming therapies per order  - Administer medications as ordered  - Instruct and encourage patient and family to use good hand hygiene technique  - Identify and instruct in appropriate isolation precautions for identified infection/condition  Outcome: Progressing     Problem: SAFETY ADULT  Goal: Patient will remain free of falls  Description: INTERVENTIONS:  - Educate patient/family on patient safety including physical limitations  - Instruct patient to call for assistance with activity   - Consult OT/PT to assist with strengthening/mobility   - Keep Call bell within reach  - Keep bed low and locked with side rails adjusted as appropriate  - Keep care items and personal belongings within reach  - Initiate and maintain comfort rounds  - Make Fall Risk Sign visible to staff  - Offer Toileting every  2 Hours, in advance of need  - Initiate/Maintain bed alarm  - Obtain necessary fall risk management equipment: non skid footwear  - Apply yellow socks and bracelet for high fall risk patients  - Consider moving patient to room near nurses station  Outcome: Progressing     Problem: RESPIRATORY - ADULT  Goal: Achieves optimal ventilation and oxygenation  Description: INTERVENTIONS:  - Assess for changes in respiratory status  - Assess for changes in mentation and behavior  - Position to facilitate oxygenation and minimize respiratory effort  - Oxygen administered by appropriate delivery if ordered  - Initiate smoking cessation education as indicated  - Encourage broncho-pulmonary hygiene including cough, deep breathe, Incentive Spirometry  - Assess the need for suctioning and aspirate as needed  - Assess and instruct to report SOB or any respiratory difficulty  - Respiratory Therapy support as indicated  Outcome: Progressing     Problem: SKIN/TISSUE INTEGRITY - ADULT  Goal: Skin Integrity remains intact(Skin Breakdown Prevention)  Description: Assess:  -Perform Flavio assessment every shift   -Clean and moisturize skin every day   -Inspect skin when repositioning, toileting, and assisting with ADLS  -Assess under medical devices such as ronny  every hour   -Assess extremities for adequate circulation and sensation     Bed Management:  -Have minimal linens on bed & keep smooth, unwrinkled  -Change linens as needed when moist or perspiring  -Avoid sitting or lying in one position for more than 2 hours while in bed  -Keep HOB at 45 degrees     Toileting:  -Offer bedside commode  -Assess for incontinence every hour  -Use incontinent care products after each incontinent episode such as moisture barrier cream     Activity:  -Mobilize patient 3 times a day  -Encourage activity and walks on unit  -Encourage or provide ROM exercises   -Turn and reposition patient every 2 Hours  -Use appropriate equipment to lift or move  patient in bed  -Instruct/ Assist with weight shifting every hour when out of bed in chair  -Consider limitation of chair time 2 hour intervals    Skin Care:  -Avoid use of baby powder, tape, friction and shearing, hot water or constrictive clothing  -Relieve pressure over bony prominences using allevyn   -Do not massage red bony areas    Next Steps:  -Teach patient strategies to minimize risks such as weight shifting    -Consider consults to  interdisciplinary teams such as PT  Outcome: Progressing  Goal: Incision(s), wounds(s) or drain site(s) healing without S/S of infection  Description: INTERVENTIONS  - Assess and document dressing, incision, wound bed, drain sites and surrounding tissue  - Provide patient and family education  Outcome: Progressing  Goal: Pressure injury heals and does not worsen  Description: Interventions:  - Implement low air loss mattress or specialty surface (Criteria met)  - Apply silicone foam dressing  - Instruct/assist with weight shifting every 30 minutes when in chair   - Limit chair time to 4 hour intervals  - Use special pressure reducing interventions such as cushion when in chair   - Apply fecal or urinary incontinence containment device   - Perform passive or active ROM every 2hrs  - Turn and reposition patient & offload bony prominences every 2 hours   - Utilize friction reducing device or surface for transfers   - Consider nutrition services referral as needed  Outcome: Progressing     Problem: Potential for Falls  Goal: Patient will remain free of falls  Description: INTERVENTIONS:  - Educate patient/family on patient safety including physical limitations  - Instruct patient to call for assistance with activity   - Consult OT/PT to assist with strengthening/mobility   - Keep Call bell within reach  - Keep bed low and locked with side rails adjusted as appropriate  - Keep care items and personal belongings within reach  - Initiate and maintain comfort rounds  - Make Fall Risk  Sign visible to staff  - Offer Toileting every 2 Hours, in advance of need  - Initiate/Maintain bed alarm  - Obtain necessary fall risk management equipment: non skid footwear  - Apply yellow socks and bracelet for high fall risk patients  - Consider moving patient to room near nurses station  Outcome: Progressing     Problem: Nutrition/Hydration-ADULT  Goal: Nutrient/Hydration intake appropriate for improving, restoring or maintaining nutritional needs  Description: Monitor and assess patient's nutrition/hydration status for malnutrition. Collaborate with interdisciplinary team and initiate plan and interventions as ordered.  Monitor patient's weight and dietary intake as ordered or per policy. Utilize nutrition screening tool and intervene as necessary. Determine patient's food preferences and provide high-protein, high-caloric foods as appropriate.     INTERVENTIONS:  - Monitor oral intake, urinary output, labs, and treatment plans  - Assess nutrition and hydration status and recommend course of action  - Evaluate amount of meals eaten  - Assist patient with eating if necessary   - Allow adequate time for meals  - Recommend/ encourage appropriate diets, oral nutritional supplements, and vitamin/mineral supplements  - Order, calculate, and assess calorie counts as needed  - Recommend, monitor, and adjust tube feedings and TPN/PPN based on assessed needs  - Assess need for intravenous fluids  - Provide specific nutrition/hydration education as appropriate  - Include patient/family/caregiver in decisions related to nutrition  Outcome: Progressing     Problem: SAFETY,RESTRAINT: NV/NON-SELF DESTRUCTIVE BEHAVIOR  Goal: Remains free of harm/injury (restraint for non violent/non self-detsructive behavior)  Description: INTERVENTIONS:  - Instruct patient/family regarding restraint use   - Assess and monitor physiologic and psychological status   - Provide interventions and comfort measures to meet assessed patient needs    - Identify and implement measures to help patient regain control  - Assess readiness for release of restraint   Outcome: Progressing  Goal: Returns to optimal restraint-free functioning  Description: INTERVENTIONS:  - Assess the patient's behavior and symptoms that indicate continued need for restraint  - Identify and implement measures to help patient regain control  - Assess readiness for release of restraint   Outcome: Progressing     Problem: COPING  Goal: Pt/Family able to verbalize concerns and demonstrate effective coping strategies  Description: INTERVENTIONS:  - Assist patient/family to identify coping skills, available support systems and cultural and spiritual values  - Provide emotional support, including active listening and acknowledgement of concerns of patient and caregivers  - Reduce environmental stimuli, as able  - Provide patient education  - Assess for spiritual pain/suffering and initiate spiritual care, including notification of Pastoral Care or [place] based community as needed  - Assess effectiveness of coping strategies  Outcome: Progressing  Goal: Will report anxiety at manageable levels  Description: INTERVENTIONS:  - Administer medication as ordered  - Teach and encourage coping skills  - Provide emotional support  - Assess patient/family for anxiety and ability to cope  Outcome: Progressing     Problem: BEHAVIOR  Goal: Pt/Family maintain appropriate behavior and adhere to behavioral management agreement, if implemented  Description: INTERVENTIONS:  - Assess the family dynamic   - Encourage verbalization of thoughts and concerns in a socially appropriate manner  - Assess patient/family's coping skills and non-compliant behavior (including use of illegal substances).  - Utilize positive, consistent limit setting strategies supporting safety of patient, staff and others  - Initiate consult with Case Management, Spiritual Care or other ancillary services as appropriate  - If a  patient's/visitor's behavior jeopardizes the safety of the patient, staff, or others, refer to organization procedure.   - Notify Security of behavior or suspected illegal substances which indicate the need for search of the patient and/or belongings  - Encourage participation in the decision making process about a behavioral management agreement; implement if patient meets criteria  Outcome: Progressing     Problem: NEUROSENSORY - ADULT  Goal: Achieves stable or improved neurological status  Description: INTERVENTIONS  - Monitor and report changes in neurological status  - Monitor vital signs such as temperature, blood pressure, glucose, and any other labs ordered   - Initiate measures to prevent increased intracranial pressure  - Monitor for seizure activity and implement precautions if appropriate      Outcome: Progressing  Goal: Achieves maximal functionality and self care  Description: INTERVENTIONS  - Monitor swallowing and airway patency with patient fatigue and changes in neurological status  - Encourage and assist patient to increase activity and self care.   - Encourage visually impaired, hearing impaired and aphasic patients to use assistive/communication devices  Outcome: Progressing     Problem: CARDIOVASCULAR - ADULT  Goal: Maintains optimal cardiac output and hemodynamic stability  Description: INTERVENTIONS:  - Monitor I/O, vital signs and rhythm  - Monitor for S/S and trends of decreased cardiac output  - Administer and titrate ordered vasoactive medications to optimize hemodynamic stability  - Assess quality of pulses, skin color and temperature  - Assess for signs of decreased coronary artery perfusion  - Instruct patient to report change in severity of symptoms  Outcome: Progressing  Goal: Absence of cardiac dysrhythmias or at baseline rhythm  Description: INTERVENTIONS:  - Continuous cardiac monitoring, vital signs, obtain 12 lead EKG if ordered  - Administer antiarrhythmic and heart rate  control medications as ordered  - Monitor electrolytes and administer replacement therapy as ordered  Outcome: Progressing     Problem: GASTROINTESTINAL - ADULT  Goal: Minimal or absence of nausea and/or vomiting  Description: INTERVENTIONS:  - Administer IV fluids if ordered to ensure adequate hydration  - Maintain NPO status until nausea and vomiting are resolved  - Nasogastric tube if ordered  - Administer ordered antiemetic medications as needed  - Provide nonpharmacologic comfort measures as appropriate  - Advance diet as tolerated, if ordered  - Consider nutrition services referral to assist patient with adequate nutrition and appropriate food choices  Outcome: Progressing  Goal: Maintains or returns to baseline bowel function  Description: INTERVENTIONS:  - Assess bowel function  - Encourage oral fluids to ensure adequate hydration  - Administer IV fluids if ordered to ensure adequate hydration  - Administer ordered medications as needed  - Encourage mobilization and activity  - Consider nutritional services referral to assist patient with adequate nutrition and appropriate food choices  Outcome: Progressing  Goal: Maintains adequate nutritional intake  Description: INTERVENTIONS:  - Monitor percentage of each meal consumed  - Identify factors contributing to decreased intake, treat as appropriate  - Assist with meals as needed  - Monitor I&O, weight, and lab values if indicated  - Obtain nutrition services referral as needed  Outcome: Progressing  Goal: Establish and maintain optimal ostomy function  Description: INTERVENTIONS:  - Assess bowel function  - Encourage oral fluids to ensure adequate hydration  - Administer IV fluids if ordered to ensure adequate hydration   - Administer ordered medications as needed  - Encourage mobilization and activity  - Nutrition services referral to assist patient with appropriate food choices  - Assess stoma site  - Consider wound care consult   Outcome: Progressing  Goal:  Oral mucous membranes remain intact  Description: INTERVENTIONS  - Assess oral mucosa and hygiene practices  - Implement preventative oral hygiene regimen  - Implement oral medicated treatments as ordered  - Initiate Nutrition services referral as needed  Outcome: Progressing     Problem: GENITOURINARY - ADULT  Goal: Maintains or returns to baseline urinary function  Description: INTERVENTIONS:  - Assess urinary function  - Encourage oral fluids to ensure adequate hydration if ordered  - Administer IV fluids as ordered to ensure adequate hydration  - Administer ordered medications as needed  - Offer frequent toileting  - Follow urinary retention protocol if ordered  Outcome: Progressing  Goal: Urinary catheter remains patent  Description: INTERVENTIONS:  - Assess patency of urinary catheter  - If patient has a chronic marie, consider changing catheter if non-functioning  - Follow guidelines for intermittent irrigation of non-functioning urinary catheter  Outcome: Progressing     Problem: METABOLIC, FLUID AND ELECTROLYTES - ADULT  Goal: Electrolytes maintained within normal limits  Description: INTERVENTIONS:  - Monitor labs and assess patient for signs and symptoms of electrolyte imbalances  - Administer electrolyte replacement as ordered  - Monitor response to electrolyte replacements, including repeat lab results as appropriate  - Instruct patient on fluid and nutrition as appropriate  Outcome: Progressing  Goal: Fluid balance maintained  Description: INTERVENTIONS:  - Monitor labs   - Monitor I/O and WT  - Instruct patient on fluid and nutrition as appropriate  - Assess for signs & symptoms of volume excess or deficit  Outcome: Progressing  Goal: Glucose maintained within target range  Description: INTERVENTIONS:  - Monitor Blood Glucose as ordered  - Assess for signs and symptoms of hyperglycemia and hypoglycemia  - Administer ordered medications to maintain glucose within target range  - Assess nutritional  intake and initiate nutrition service referral as needed  Outcome: Progressing     Problem: HEMATOLOGIC - ADULT  Goal: Maintains hematologic stability  Description: INTERVENTIONS  - Assess for signs and symptoms of bleeding or hemorrhage  - Monitor labs  - Administer supportive blood products/factors as ordered and appropriate  Outcome: Progressing     Problem: MUSCULOSKELETAL - ADULT  Goal: Maintain or return mobility to safest level of function  Description: INTERVENTIONS:  - Assess patient's ability to carry out ADLs; assess patient's baseline for ADL function and identify physical deficits which impact ability to perform ADLs (bathing, care of mouth/teeth, toileting, grooming, dressing, etc.)  - Assess/evaluate cause of self-care deficits   - Assess range of motion  - Assess patient's mobility  - Assess patient's need for assistive devices and provide as appropriate  - Encourage maximum independence but intervene and supervise when necessary  - Involve family in performance of ADLs  - Assess for home care needs following discharge   - Consider OT consult to assist with ADL evaluation and planning for discharge  - Provide patient education as appropriate  Outcome: Progressing

## 2024-03-14 NOTE — PROGRESS NOTES
Progress Note - Infectious Disease   Carla Hunt 59 y.o. female MRN: [id number]  [facility]: [id number]      Impression/Plan:    1. Acute hypoxic respiratory failure, likely multifactorial, with both COVID and bacterial pneumonia contributing.  Also consider persistent inflammation, fibrosis as seems quite steroid responsive in past.  Most recently extubated 3/1.  Patient with worsening respiratory status requiring intubation again 3/11. Consider progressive bacterial infection given increased secretions, although has been on antibiotics and has no fever, leukocytosis, or focal infiltrates on recent CT.  Consider recrudescence of inflammation in setting of steroid taper as this has been observed previously.  CRP decreased with increase in steroids, no change in antibiotics.  Still has low-level COVID shedding although suspect this is not primarily driving worsening hypoxia.  Status post bronchoscopy and trach 3/12 with excessive secretions seen from the lower lobes bilaterally.  BAL culture from 3/11 shows heavy growth of Stenotrophomonas maltophilia.  PJP DFA negative. While the patient has grown this before and she certainly may be colonized, given worsening CT findings, intubation and prolonged steroids would treat as a true pathogen.  Status post 10 days of vancomycin and cefepime.  Started on Bactrim 3/13  -Continue antivirals for now  -continue Bactrim 2 DS tabs twice daily crushed via NG tube  -Steroid dosing per critical care, tolerating weaning with improving CRP  -No indication to treat Candida in respiratory culture, this is respiratory colonization  -Follow up BAL cultures  -Trend CRP  -If clinically deteriorates with concern for progressive sepsis would add meropenem 1g IV Q8 to the Bactrim     2. SIRS versus sepsis.  Fluctuating fever, WBC.  Fevers may be multifactorial due to COVID (still positive antigen and PCR) versus inflammation (seem to correlate to steroid dosing).   Also consider developing bacterial infection.  Recent blood cultures 2/26 negative.  CT C/A/P 3/10 shows stable groundglass and nodular opacities, inflammation around stoma. Now with dehiscence of her abdominal wound following staple removal, status post wound VAC placement 3/13  -Continue current antivirals  -antibiotics as above  -Follow temperatures closely  -Recheck WBC in AM to monitor infection  -Supportive care as per the primary service     3. Recurrent bacterial pneumonia.  The patient has completed multiple treatment courses over the hospitalization.  Most recent BAL culture with Pseudomonas and stenotrophomonas.  Unclear if pathogens or colonizers.  Status post 10 days levofloxacin.  CT C/A/P showed evolving changes likely all due to sequelae of COVID, infections.  Noted to have worsening hypoxia and purulent secretions on bronchoscopy 3/11.  BAL culture with heavy growth of Stenotrophomonas maltophilia              -Antibiotics as above              -Wean O2 as able     4. Severe COVID, present on admission.  Patient was treated with a 10-day course of remdesivir, dexamethasone and was given 1 dose of Tocilizumab on admission.  COVID antigen was negative prior to coming off isolation.  Had positive COVID PCR from BAL 2/16, status post another 5-day course of remdesivir.  Patient has remained on high-dose systemic corticosteroid throughout her hospitalization which were recently intensified 2/26 for fevers.  Patient having persistent fevers, hypoxia.  COVID antigen positive and PCR is also positive 3/3 and Ct 26.8, consistent with high viral replication.  Repeat PCR positive with Ct now closer to 30. CRP decreasing with slow steroid wean  -Continue remdesivir/Paxlovid to complete a 14-day total course through 3/15/2024  -Steroid dosing per ICU     5. 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  Patient is status post exploratory laparotomy with ileocecectomy and end ileostomy creation 2/20.    End ileostomy is with ongoing ischemic changes which is likely contributing to the imaging findings and ongoing SIRS response.  Now with wound dehiscence status post staple removal, status post wound VAC placement 3/13  -Serial exams  -Surgery follow-up   -no current signs of infection, need for antibiotics      B-cell lymphoma, on maintenance rituxan, which is currently postponed.  Rituximab is a risk factor for persistent COVID infection.    Above management plan to continue Bactrim with the critical care resident Dr. Lazcano who is in agreement. Discussed with the  at bedside. ID will follow.    Antibiotics:  Day 12 Paxlovid/remdesivir  Day 2 Bactrim    Subjective:  The patient remains on the ventilator requiring 40% FiO2.  However she was slightly more awake today, she did have some participation with PT and responded to her .  No fevers.  Wound VAC is in place.    Objective:  Vitals:  Temp:  [97.7 °F (36.5 °C)-98.4 °F (36.9 °C)] 98.2 °F (36.8 °C)  HR:  [] 108  Resp:  [12-42] 25  BP: (104-129)/(40-56) 120/49  SpO2:  [93 %-100 %] 98 %  Temp (24hrs), Av °F (36.7 °C), Min:97.7 °F (36.5 °C), Max:98.4 °F (36.9 °C)  Current: Temperature: 98.2 °F (36.8 °C)    Physical Exam:   General Appearance:  Ill-appearing, lethargic   Neck: Trach in place   Lungs:   Rhonchi bilaterally   Heart:  RRR; no murmur, rub or gallop   Abdomen:   Midline wound dehiscence with wound VAC in place, no surrounding erythema   Extremities: No edema   Skin: No new rashes or lesions.        Labs:   All pertinent labs and imaging studies were personally reviewed  Results from last 7 days   Lab Units 24  0542 24  0449 24  0210   WBC Thousand/uL 10.46* 8.87 8.01   HEMOGLOBIN g/dL 7.4* 7.4* 7.7*   PLATELETS Thousands/uL 191 163 159     Results from last 7 days   Lab Units 24  0542 24  0449 24  1233 03/10/24  1806 03/10/24  0513   SODIUM mmol/L 145 144 143   < > 148*   POTASSIUM mmol/L 3.2* 4.0  5.0   < > 4.0   CHLORIDE mmol/L 112* 114* 113*   < > 112*   CO2 mmol/L 19* 19* 23   < > 24   BUN mg/dL 59* 53* 44*   < > 38*   CREATININE mg/dL 1.55* 1.28 0.96   < > 0.59*   EGFR ml/min/1.73sq m 36 46 65   < > 101   CALCIUM mg/dL 7.7* 7.7* 7.5*   < > 8.7   AST U/L  --   --   --   --  18   ALT U/L  --   --   --   --  31   ALK PHOS U/L  --   --   --   --  90    < > = values in this interval not displayed.     Results from last 7 days   Lab Units 03/10/24  0513   PROCALCITONIN ng/ml 1.08*     Results from last 7 days   Lab Units 03/14/24  0542 03/13/24  0449 03/12/24  0401 03/11/24  0415 03/10/24  0513 03/09/24  0554 03/08/24  0541   CRP mg/L 32.3* 73.6* 106.3* 166.1* 112.1* 14.8* 36.9*           Results from last 7 days   Lab Units 03/12/24  0446   D-DIMER QUANTITATIVE ug/ml FEU 0.54*         Imaging:  CT chest, abdomen, pelvis personally reviewed and shows persistent bilateral groundglass nodular consolidation

## 2024-03-14 NOTE — UTILIZATION REVIEW
Continued Stay Review    Date: 3/14                          Current Patient Class: inpt   Current Level of Care: level 2 stepdown     HPI:59 y.o. female initially admitted on 1/10/2024.  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  Levophed gtt .  S/p trach placement 3/12 - remains on MV w/fentanyl and propofol gtt's.    Persistent COVID-19 infection; on antivirals, steroids (insulin gtt)  trying to wean off   Continues to require IV dilaudid,  IV Fentanyl for agitation.   Only opens eyes to voice only and not following commands on frequent neuro checks despite prior sedation holiday after extubation 3/12, new from prior to intubation 3/11. PERRL.     CTH 3/13 negative for acute intracranial findings.  Scheduled for MRI brain today.   Continues to require prn IV dilaudid, versed, IV Kcl repletion        Pertinent Labs/Diagnostic Results:   MRI brain 1/9 notable for small acute lucanar infarct  MRI brain seizure wo and w contrast 1/10- previsously identified lesion concerning for lucnar infarct likely an artifact.   vEEG 1/10-12 negative for seizure like activity   Only opens eyes to voice only and not following commands on frequent neuro checks despite prior sedation holiday after extubation 3/12, new from prior to intubation 3/11. PERRL  CTH 3/13 negative for acute intracranial findings.        Results from last 7 days   Lab Units 03/11/24  0417   SARS-COV-2  Positive*     Results from last 7 days   Lab Units 03/14/24  0542 03/13/24  0449 03/13/24  0210 03/12/24  0758 03/12/24  0446 03/10/24  0513 03/09/24  0554   WBC Thousand/uL 10.46* 8.87 8.01 9.71 11.99*   < > 21.99*  21.99*   HEMOGLOBIN g/dL 7.4* 7.4* 7.7* 7.7* 6.0*   < > 8.7*  8.7*   HEMATOCRIT % 22.5* 22.1* 23.1* 23.6* 19.0*   < > 27.8*  27.8*   PLATELETS Thousands/uL 191 163 159 179 205   < > 419*  419*    BANDS PCT %  --   --   --  14*  --   --  2    < > = values in this interval not displayed.       Results from last 7 days   Lab Units 03/14/24  0542 03/13/24  0449 03/12/24  1233 03/12/24  0401 03/11/24  0415 03/10/24  1806 03/10/24  0513   SODIUM mmol/L 145 144 143 145 147   < > 148*   POTASSIUM mmol/L 3.2* 4.0 5.0 3.0* 4.1   < > 4.0   CHLORIDE mmol/L 112* 114* 113* 111* 115*   < > 112*   CO2 mmol/L 19* 19* 23 23 21   < > 24   ANION GAP mmol/L 14* 11 7 11 11   < > 12   BUN mg/dL 59* 53* 44* 41* 39*   < > 38*   CREATININE mg/dL 1.55* 1.28 0.96 0.90 0.69   < > 0.59*   EGFR ml/min/1.73sq m 36 46 65 70 96   < > 101   CALCIUM mg/dL 7.7* 7.7* 7.5* 7.9* 8.3*   < > 8.7   CALCIUM, IONIZED mmol/L 1.12 1.12  --   --   --   --   --    MAGNESIUM mg/dL 2.0 2.0  --  2.1 2.2  --  2.2   PHOSPHORUS mg/dL 4.0  4.0 3.9  --   --   --   --   --     < > = values in this interval not displayed.       Results from last 7 days   Lab Units 03/14/24  0819 03/14/24  0600 03/14/24  0416 03/14/24  0148 03/13/24  2348 03/13/24  2150 03/13/24  2008 03/13/24  1740 03/13/24  1630 03/13/24  1348 03/13/24  1145 03/13/24  0954   POC GLUCOSE mg/dl 102 141* 152* 179* 102 132 142* 164* 174* 119 130 111     Results from last 7 days   Lab Units 03/14/24  0542 03/13/24  0449 03/12/24  1233 03/12/24  0401 03/11/24  0415 03/11/24  0028 03/10/24  1806 03/10/24  0513 03/09/24  2225 03/09/24  1358 03/09/24  0554 03/08/24  1427   GLUCOSE RANDOM mg/dL 141* 123 106 128 238* 116 215* 176* 187* 143* 151* 80       Results from last 7 days   Lab Units 03/14/24  0104 03/12/24  2356 03/12/24  0832   PH ART  7.374 7.319* 7.330*   PCO2 ART mm Hg 34.3* 37.8 40.7   PO2 ART mm Hg 89.9 83.4 115.3   HCO3 ART mmol/L 19.6* 19.0* 21.0*   BASE EXC ART mmol/L -5.1 -6.5 -4.6   O2 CONTENT ART mL/dL 10.7* 10.9* 12.0*   O2 HGB, ARTERIAL % 96.2 94.5 97.1*   ABG SOURCE  Line, Arterial Line, Arterial Line, Arterial     Results from last 7 days   Lab Units 03/10/24  0534   PH NICA  7.391    PCO2 NICA mm Hg 39.2*   PO2 NICA mm Hg 40.3   HCO3 NICA mmol/L 23.3*   BASE EXC NICA mmol/L -1.5   O2 CONTENT NICA ml/dL 9.9   O2 HGB, VENOUS % 73.3       Results from last 7 days   Lab Units 03/12/24 0446   D-DIMER QUANTITATIVE ug/ml FEU 0.54*     Results from last 7 days   Lab Units 03/12/24 0446 03/12/24  0431   PROTIME seconds  --  16.8*   INR   --  1.39*   PTT seconds 33  --            Results from last 7 days   Lab Units 03/14/24  0542 03/13/24  0449 03/12/24  0401 03/11/24  0415 03/10/24  0513   CRP mg/L 32.3* 73.6* 106.3* 166.1* 112.1*           Results from last 7 days   Lab Units 03/11/24  1306   GRAM STAIN RESULT  3+ Polys*  3+ Gram variable rods*  1+ Budding Yeast with Pseudomycelia*             Results from last 7 days   Lab Units 03/14/24  0542 03/13/24  0449 03/12/24  0441 03/11/24  0557   VANCOMYCIN RM ug/mL 23.7* 27.9* 31.0* 26.7*       Medications:   Scheduled Medications:  chlorhexidine, 15 mL, Mouth/Throat, Q12H SARTHAK  enoxaparin, 40 mg, Subcutaneous, Q24H SARTHAK  ipratropium, 0.5 mg, Nebulization, TID  lamoTRIgine, 150 mg, Oral, BID  levalbuterol, 1.25 mg, Nebulization, TID  methylPREDNISolone sodium succinate, 50 mg, Intravenous, Q6H SARTHAK  multi-electrolyte, 500 mL, Intravenous, Once  nirmatrelvir & ritonavir, 3 tablet, Oral, BID  nystatin, , Topical, BID  polyethylene glycol, 17 g, Per NG Tube, Daily  potassium chloride, 40 mEq, Intravenous, Once  propofol, 100 mg, Intravenous, Once  remdesivir, 100 mg, Intravenous, Q24H  sodium chloride, 2 spray, Each Nare, Q4H  sulfamethoxazole-trimethoprim, 2 tablet, Per NG Tube, Q12H SARTHAK  topiramate, 100 mg, Per PEG Tube, BID  venlafaxine, 25 mg, Oral, Daily      Continuous IV Infusions:  fentaNYL, 50 mcg/hr, Intravenous, Continuous  insulin regular (HumuLIN R,NovoLIN R) 1 Units/mL in sodium chloride 0.9 % 100 mL infusion, 0.3-21 Units/hr, Intravenous, Titrated  norepinephrine, 1-30 mcg/min, Intravenous, Titrated  propofol, 5-50 mcg/kg/min, Intravenous,  Titrated      PRN Meds:  acetaminophen, 650 mg, Oral, Q6H PRN  fentaNYL, 50 mcg, Intravenous, Q1H PRN  OLANZapine, 10 mg, Oral, HS PRN  ondansetron, 4 mg, Intravenous, Q6H PRN  oxyCODONE, 2.5 mg, Oral, Q4H PRN   Or  oxyCODONE, 5 mg, Oral, Q4H PRN  sodium chloride, 1 Application, Nasal, Q1H PRN        Discharge Plan: tbd    Network Utilization Review Department  ATTENTION: Please call with any questions or concerns to [phone] and carefully listen to the prompts so that you are directed to the right person. All voicemails are confidential.   For Discharge needs, contact Care Management DC Support Team at [phone] opt. 2  Send all requests for admission clinical reviews, approved or denied determinations and any other requests to dedicated fax number below belonging to the campus where the patient is receiving treatment. List of dedicated fax numbers for the Facilities:  FACILITY NAME UR FAX NUMBER   ADMISSION DENIALS (Administrative/Medical Necessity) [phone]   DISCHARGE SUPPORT TEAM (NETWORK) [phone]   PARENT CHILD HEALTH (Maternity/NICU/Pediatrics) [phone]   [facility] [phone]   [facility] [phone]   [facility] [phone]   [facility] [phone]   Counts include 234 beds at the [facility] [phone]   [facility] [phone]   [facility] [phone]   [facility] [phone]   [facility] [phone]   [facility] [phone]   [facility] [phone]   [facility] [phone]

## 2024-03-14 NOTE — PROCEDURES
POC Cardiac US    Date/Time: 3/14/2024 12:11 PM    Performed by: Joe Lazcano DO  Authorized by: Nigel Escobar DO    Patient location:  ICU  Procedure details:     Exam Type:  Diagnostic    Indications: hypotension      Assessment / Evaluation for: inferior vena cava for fluid responsiveness      Exam Type: initial exam      Image quality: diagnostic      Image availability:  Not saved  Patient Details:     Cardiac Rhythm:  Regular    Medications: norepinephrine infusion      Mechanical ventilation: Yes    Cardiac findings:     Echo technique: limited 2D    IVC findings:     IVC Size: dilated      IVC Inspiratory Collapse: partial    Interpretation:     Fluid Status:  Hypovolemic     Patient with hypotension, ELIESER of prerenal etiology with IVC collapse on bedside US as above. UO adequate. Will trial 500cc isolyte bolus.         ----------------------------------------------------  Joe Lazcano DO, PGY-2  Internal Medicine Residency   [facility] [place]

## 2024-03-14 NOTE — PLAN OF CARE
Problem: OCCUPATIONAL THERAPY ADULT  Goal: Performs self-care activities at highest level of function for planned discharge setting.  See evaluation for individualized goals.  Description: Treatment Interventions: ADL retraining, Functional transfer training, UE strengthening/ROM, Endurance training, Patient/family training, Equipment evaluation/education, Compensatory technique education, Continued evaluation, Energy conservation, Activityengagement          See flowsheet documentation for full assessment, interventions and recommendations.   Outcome: Progressing  Note: Limitation: Decreased ADL status, Decreased UE ROM, Decreased UE strength, Decreased Safe judgement during ADL, Decreased cognition, Decreased endurance, Decreased self-care trans, Decreased high-level ADLs, Non-func R UE, Non-func L UE  Prognosis: Fair, Poor  Assessment: Patient participated in Skilled OT session this date with interventions consisting of therapeutic exercise to: increase functional use of BUEs, increase BUE muscle strength ,  therapeutic activities to: increase activity tolerance, and increase sitting tolerance. Performed B/L UE ROM with G tolerance with bed in chair position. Pt able to tolerate sitting in full chair position for ~ 15 minutes with VSS. Pt opens eyes spontaneously, opens to stimuli, and opens to her name. Provided tactile and auditory input t/o. Performed face washing dependently using RUE and hand over hand to engage RUE muscles and provide tactile input. Pt tolerated all activities. Placed pillows and wedges for proper positioning and offloading. Also provided rolled up wash clothes to simulate proper resting position - discussed resting hand splints with resident. Patient continues to be functioning below baseline level, occupational performance remains limited secondary to factors listed above and increased risk for falls and injury.   From OT standpoint, recommendation at time of d/c would be STR.  Patient  to benefit from continued Occupational Therapy treatment while in the hospital to address deficits as defined above and maximize level of functional independence with ADLs and functional mobility. Pt was left after session with all current needs met. The patient's raw score on the AM-PAC Daily Activity Inpatient Short Form is 6. A raw score of less than 19 suggests the patient may benefit from discharge to post-acute rehabilitation services. Please refer to the recommendation of the Occupational Therapist for safe discharge planning.     Rehab Resource Intensity Level, OT: II (Moderate Resource Intensity)

## 2024-03-14 NOTE — PHYSICAL THERAPY NOTE
PT Treatment       03/14/24 1109   PT Last Visit   PT Visit Date 03/14/24   Note Type   Note Type Treatment   Pain Assessment   Pain Assessment Tool FLACC   Pain Rating: FLACC (Rest) - Face 0   Pain Rating: FLACC (Rest) - Legs 0   Pain Rating: FLACC (Rest) - Activity 0   Pain Rating: FLACC (Rest) - Cry 0   Pain Rating: FLACC (Rest) - Consolability 0   Score: FLACC (Rest) 0   Pain Rating: FLACC (Activity) - Face 0   Pain Rating: FLACC (Activity) - Legs 0   Pain Rating: FLACC (Activity) - Activity 0   Pain Rating: FLACC (Activity) - Cry 0   Pain Rating: FLACC (Activity) - Consolability 0   Score: FLACC (Activity) 0   Restrictions/Precautions   Weight Bearing Precautions Per Order No   Braces or Orthoses   (none)   Other Precautions Contact/isolation;Airborne/isolation;Cognitive;Chair Alarm;Bed Alarm;Multiple lines;O2;Telemetry;Fall Risk   General   Chart Reviewed Yes   Family/Caregiver Present Yes  (,  reports she likes rock music)   Cognition   Overall Cognitive Status Impaired   Arousal/Participation Sedated   Attention WILL   Orientation Level Unable to assess   Memory Unable to assess   Following Commands Unable to follow one step commands   Comments nonverbal; occasionally opened eyes inconsistently to stimulus   Subjective   Subjective pt opened eyes occasionally   Bed Mobility   Supine to Sit 1  Dependent   Additional items Other  (bed moved into full chair position, vitals remained stable, tolerated for 15 minutes)   Transfers   Sit to Stand Unable to assess  (not appropriate at this time)   Ambulation/Elevation   Gait pattern Not appropriate   Balance   Static Sitting Zero   Endurance Deficit   Endurance Deficit Yes   Endurance Deficit Description limited due to weakness, fatigue, and cognitive deficits   Activity Tolerance   Activity Tolerance Patient limited by fatigue;Other (Comment)  (weakness and cognitive deficits)   Medical Staff Made Aware OT   Nurse Made Aware yes   Exercises   Hip  Flexion Sitting;20 reps;Bilateral;PROM  (1 x 10 hip flexion; 1 x 10 D1)   Knee AROM Long Arc Quad Sitting;10 reps;Bilateral;PROM   Heel Cord Stretch Sitting;20 reps;PROM;Bilateral   Assessment   Prognosis Fair   Problem List Decreased strength;Decreased range of motion;Decreased endurance;Impaired balance;Decreased mobility;Decreased cognition;Decreased coordination;Impaired judgement;Decreased safety awareness;Decreased skin integrity;Pain   Assessment PT session focused on sitting tolerance and LE exercises. Pt did tolerate treatment well today and occasionally opened eyes (inconsistent w/ stimulus). Pt was able to perform a dependent supine to sit (bed moved into full chair position). Vitals remained stable for the 15 minutes of sitting in bed. Pt performed PROM LE exercises at each joint. Pt would continue to benefit from skilled PT. Pt will continue to be seen upon admission. Pt's prognosis is Fair secondary to age, PLOF, PMH, cognitive deficits, and medical complications during current admission. The patient's [facility] Basic Mobility Inpatient Standardized Score is less than 42.9, suggesting this patient may benefit from discharge to post-acute rehabilitation services. Please also refer to the recommendation of the Physical Therapist for safe discharge planning.   Goals   Patient Goals none stated at this time   [facility] Expiration Date 03/19/24   Plan   Treatment/Interventions ADL retraining;Functional transfer training;LE strengthening/ROM;Endurance training;Therapeutic exercise;Cognitive reorientation;Equipment eval/education;Bed mobility;Patient/family training;Gait training;Spoke to nursing;OT   Progress Slow progress, cognitive deficits   PT Frequency 2-3x/wk   Discharge Recommendation   Rehab Resource Intensity Level, PT I (Maximum Resource Intensity)   AM-PAC Basic Mobility Inpatient   Turning in Flat Bed Without Bedrails 1   Lying on Back to Sitting on Edge of Flat Bed Without Bedrails 1   Moving Bed to  Chair 1   Standing Up From Chair Using Arms 1   Walk in Room 1   Climb 3-5 Stairs With Railing 1   Basic Mobility Inpatient Raw Score 6   Turning Head Towards Sound 1   Follow Simple Instructions 1   Low Function Basic Mobility Raw Score  8   Low Function Basic Mobility Standardized Score  10.37   [facility] Highest Level Of Mobility   -[facility] Goal 2: Bed activities/Dependent transfer   -HL Achieved 2: Bed activities/Dependent transfer     Karlene Mata, SPT

## 2024-03-14 NOTE — RESPIRATORY THERAPY NOTE
RT Ventilator Management Note  Carla Hunt 59 y.o. female MRN: [id number]  Unit/Bed#: [facility] 12 Encounter: [id number]      Daily Screen         3/13/2024  1923 3/14/2024  0718          Patient safety screen outcome:: Failed Failed      Not Ready for Weaning due to:: Underline problem not resolved Underline problem not resolved                Physical Exam:   Assessment Type: Assess only  General Appearance: Sleeping  Respiratory Pattern: Assisted  Chest Assessment: Chest expansion symmetrical  Bilateral Breath Sounds: Clear      Resp Comments: (P) pt remains on P-CMV settings at this time. no respiratory distress is noted, spo2 wnl, will continue to monitor per respiratory protocol.

## 2024-03-14 NOTE — PROGRESS NOTES
Nutrition Follow-Up/Recommendations:    Pt no longer receiving calories from propofol.     To meet estimated calorie needs, increased goal TF rate to 45mL/hr, decrease Prosource to 1 packet daily.   This will provide 1680 kcals, 88g protein, 885mL water from TF formula + prosource administration.     Adjustments in additional free water flushes per critical care.

## 2024-03-15 LAB
ANION GAP SERPL CALCULATED.3IONS-SCNC: 11 MMOL/L (ref 4–13)
BUN SERPL-MCNC: 61 MG/DL (ref 5–25)
CA-I BLD-SCNC: 1.14 MMOL/L (ref 1.12–1.32)
CALCIUM SERPL-MCNC: 7.6 MG/DL (ref 8.4–10.2)
CHLORIDE SERPL-SCNC: 109 MMOL/L (ref 96–108)
CO2 SERPL-SCNC: 18 MMOL/L (ref 21–32)
CREAT SERPL-MCNC: 1.59 MG/DL (ref 0.6–1.3)
CRP SERPL QL: 18.6 MG/L
ERYTHROCYTE [DISTWIDTH] IN BLOOD BY AUTOMATED COUNT: 20.4 % (ref 11.6–15.1)
FUNGUS SPEC CULT: ABNORMAL
GFR SERPL CREATININE-BSD FRML MDRD: 35 ML/MIN/1.73SQ M
GLUCOSE SERPL-MCNC: 119 MG/DL (ref 65–140)
GLUCOSE SERPL-MCNC: 128 MG/DL (ref 65–140)
GLUCOSE SERPL-MCNC: 133 MG/DL (ref 65–140)
GLUCOSE SERPL-MCNC: 134 MG/DL (ref 65–140)
GLUCOSE SERPL-MCNC: 141 MG/DL (ref 65–140)
GLUCOSE SERPL-MCNC: 141 MG/DL (ref 65–140)
GLUCOSE SERPL-MCNC: 147 MG/DL (ref 65–140)
GLUCOSE SERPL-MCNC: 151 MG/DL (ref 65–140)
GLUCOSE SERPL-MCNC: 157 MG/DL (ref 65–140)
GLUCOSE SERPL-MCNC: 179 MG/DL (ref 65–140)
GLUCOSE SERPL-MCNC: 187 MG/DL (ref 65–140)
GLUCOSE SERPL-MCNC: 190 MG/DL (ref 65–140)
GLUCOSE SERPL-MCNC: 213 MG/DL (ref 65–140)
GLUCOSE SERPL-MCNC: 219 MG/DL (ref 65–140)
HCT VFR BLD AUTO: 21.2 % (ref 34.8–46.1)
HGB BLD-MCNC: 7.1 G/DL (ref 11.5–15.4)
MAGNESIUM SERPL-MCNC: 1.9 MG/DL (ref 1.9–2.7)
MCH RBC QN AUTO: 30.3 PG (ref 26.8–34.3)
MCHC RBC AUTO-ENTMCNC: 33.5 G/DL (ref 31.4–37.4)
MCV RBC AUTO: 91 FL (ref 82–98)
PHOSPHATE SERPL-MCNC: 3.8 MG/DL (ref 2.7–4.5)
PLATELET # BLD AUTO: 118 THOUSANDS/UL (ref 149–390)
PMV BLD AUTO: 12.3 FL (ref 8.9–12.7)
POTASSIUM SERPL-SCNC: 3.3 MMOL/L (ref 3.5–5.3)
RBC # BLD AUTO: 2.34 MILLION/UL (ref 3.81–5.12)
SCAN RESULT: NORMAL
SODIUM SERPL-SCNC: 138 MMOL/L (ref 135–147)
WBC # BLD AUTO: 7.59 THOUSAND/UL (ref 4.31–10.16)

## 2024-03-15 PROCEDURE — 86140 C-REACTIVE PROTEIN: CPT | Performed by: STUDENT IN AN ORGANIZED HEALTH CARE EDUCATION/TRAINING PROGRAM

## 2024-03-15 PROCEDURE — 80048 BASIC METABOLIC PNL TOTAL CA: CPT | Performed by: STUDENT IN AN ORGANIZED HEALTH CARE EDUCATION/TRAINING PROGRAM

## 2024-03-15 PROCEDURE — 94760 N-INVAS EAR/PLS OXIMETRY 1: CPT

## 2024-03-15 PROCEDURE — 94003 VENT MGMT INPAT SUBQ DAY: CPT

## 2024-03-15 PROCEDURE — 82948 REAGENT STRIP/BLOOD GLUCOSE: CPT

## 2024-03-15 PROCEDURE — 97110 THERAPEUTIC EXERCISES: CPT

## 2024-03-15 PROCEDURE — 99291 CRITICAL CARE FIRST HOUR: CPT | Performed by: INTERNAL MEDICINE

## 2024-03-15 PROCEDURE — 2W03X6Z CHANGE PRESSURE DRESSING ON ABDOMINAL WALL: ICD-10-PCS | Performed by: SURGERY

## 2024-03-15 PROCEDURE — 97112 NEUROMUSCULAR REEDUCATION: CPT

## 2024-03-15 PROCEDURE — 83735 ASSAY OF MAGNESIUM: CPT | Performed by: STUDENT IN AN ORGANIZED HEALTH CARE EDUCATION/TRAINING PROGRAM

## 2024-03-15 PROCEDURE — 94664 DEMO&/EVAL PT USE INHALER: CPT

## 2024-03-15 PROCEDURE — 84100 ASSAY OF PHOSPHORUS: CPT

## 2024-03-15 PROCEDURE — 97605 NEG PRS WND THER DME<=50SQCM: CPT | Performed by: SURGERY

## 2024-03-15 PROCEDURE — 99233 SBSQ HOSP IP/OBS HIGH 50: CPT | Performed by: STUDENT IN AN ORGANIZED HEALTH CARE EDUCATION/TRAINING PROGRAM

## 2024-03-15 PROCEDURE — 97535 SELF CARE MNGMENT TRAINING: CPT

## 2024-03-15 PROCEDURE — 85027 COMPLETE CBC AUTOMATED: CPT | Performed by: STUDENT IN AN ORGANIZED HEALTH CARE EDUCATION/TRAINING PROGRAM

## 2024-03-15 PROCEDURE — 82330 ASSAY OF CALCIUM: CPT

## 2024-03-15 PROCEDURE — 94640 AIRWAY INHALATION TREATMENT: CPT

## 2024-03-15 RX ORDER — SODIUM CHLORIDE, SODIUM GLUCONATE, SODIUM ACETATE, POTASSIUM CHLORIDE, MAGNESIUM CHLORIDE, SODIUM PHOSPHATE, DIBASIC, AND POTASSIUM PHOSPHATE .53; .5; .37; .037; .03; .012; .00082 G/100ML; G/100ML; G/100ML; G/100ML; G/100ML; G/100ML; G/100ML
500 INJECTION, SOLUTION INTRAVENOUS ONCE
Status: COMPLETED | OUTPATIENT
Start: 2024-03-15 | End: 2024-03-15

## 2024-03-15 RX ORDER — METHYLPREDNISOLONE SODIUM SUCCINATE 40 MG/ML
10 INJECTION, POWDER, LYOPHILIZED, FOR SOLUTION INTRAMUSCULAR; INTRAVENOUS DAILY
Status: DISCONTINUED | OUTPATIENT
Start: 2024-04-08 | End: 2024-03-20

## 2024-03-15 RX ORDER — SULFAMETHOXAZOLE AND TRIMETHOPRIM 800; 160 MG/1; MG/1
1 TABLET ORAL EVERY 12 HOURS SCHEDULED
Status: DISCONTINUED | OUTPATIENT
Start: 2024-03-15 | End: 2024-03-15

## 2024-03-15 RX ORDER — HEPARIN SODIUM 5000 [USP'U]/ML
5000 INJECTION, SOLUTION INTRAVENOUS; SUBCUTANEOUS EVERY 8 HOURS SCHEDULED
Status: DISCONTINUED | OUTPATIENT
Start: 2024-03-15 | End: 2024-03-20

## 2024-03-15 RX ORDER — METHYLPREDNISOLONE SODIUM SUCCINATE 40 MG/ML
10 INJECTION, POWDER, LYOPHILIZED, FOR SOLUTION INTRAMUSCULAR; INTRAVENOUS EVERY 8 HOURS SCHEDULED
Status: DISCONTINUED | OUTPATIENT
Start: 2024-03-29 | End: 2024-03-20

## 2024-03-15 RX ORDER — METHYLPREDNISOLONE SODIUM SUCCINATE 40 MG/ML
10 INJECTION, POWDER, LYOPHILIZED, FOR SOLUTION INTRAMUSCULAR; INTRAVENOUS EVERY 12 HOURS SCHEDULED
Status: DISCONTINUED | OUTPATIENT
Start: 2024-04-01 | End: 2024-03-20

## 2024-03-15 RX ORDER — METHYLPREDNISOLONE SODIUM SUCCINATE 40 MG/ML
30 INJECTION, POWDER, LYOPHILIZED, FOR SOLUTION INTRAMUSCULAR; INTRAVENOUS EVERY 6 HOURS SCHEDULED
Status: DISCONTINUED | OUTPATIENT
Start: 2024-03-20 | End: 2024-03-20

## 2024-03-15 RX ORDER — METHYLPREDNISOLONE SODIUM SUCCINATE 40 MG/ML
20 INJECTION, POWDER, LYOPHILIZED, FOR SOLUTION INTRAMUSCULAR; INTRAVENOUS EVERY 6 HOURS SCHEDULED
Status: DISCONTINUED | OUTPATIENT
Start: 2024-03-23 | End: 2024-03-20

## 2024-03-15 RX ORDER — METHYLPREDNISOLONE SODIUM SUCCINATE 40 MG/ML
40 INJECTION, POWDER, LYOPHILIZED, FOR SOLUTION INTRAMUSCULAR; INTRAVENOUS EVERY 6 HOURS SCHEDULED
Status: COMPLETED | OUTPATIENT
Start: 2024-03-17 | End: 2024-03-19

## 2024-03-15 RX ORDER — METHYLPREDNISOLONE SODIUM SUCCINATE 40 MG/ML
10 INJECTION, POWDER, LYOPHILIZED, FOR SOLUTION INTRAMUSCULAR; INTRAVENOUS EVERY 6 HOURS SCHEDULED
Status: DISCONTINUED | OUTPATIENT
Start: 2024-03-26 | End: 2024-03-20

## 2024-03-15 RX ORDER — SULFAMETHOXAZOLE AND TRIMETHOPRIM 800; 160 MG/1; MG/1
2 TABLET ORAL EVERY 12 HOURS SCHEDULED
Status: DISCONTINUED | OUTPATIENT
Start: 2024-03-15 | End: 2024-03-18

## 2024-03-15 RX ORDER — POTASSIUM CHLORIDE 29.8 MG/ML
40 INJECTION INTRAVENOUS ONCE
Status: COMPLETED | OUTPATIENT
Start: 2024-03-15 | End: 2024-03-15

## 2024-03-15 RX ORDER — POTASSIUM CHLORIDE 14.9 MG/ML
20 INJECTION INTRAVENOUS ONCE
Qty: 100 ML | Refills: 0 | Status: COMPLETED | OUTPATIENT
Start: 2024-03-15 | End: 2024-03-15

## 2024-03-15 RX ORDER — METHYLPREDNISOLONE SODIUM SUCCINATE 125 MG/2ML
50 INJECTION, POWDER, LYOPHILIZED, FOR SOLUTION INTRAMUSCULAR; INTRAVENOUS EVERY 6 HOURS SCHEDULED
Status: COMPLETED | OUTPATIENT
Start: 2024-03-15 | End: 2024-03-16

## 2024-03-15 RX ADMIN — NYSTATIN: 100000 POWDER TOPICAL at 08:33

## 2024-03-15 RX ADMIN — Medication 2 SPRAY: at 18:29

## 2024-03-15 RX ADMIN — METHYLPREDNISOLONE SODIUM SUCCINATE 50 MG: 125 INJECTION, POWDER, FOR SOLUTION INTRAMUSCULAR; INTRAVENOUS at 05:26

## 2024-03-15 RX ADMIN — SODIUM CHLORIDE 8 UNITS/HR: 9 INJECTION, SOLUTION INTRAVENOUS at 21:18

## 2024-03-15 RX ADMIN — FENTANYL CITRATE 50 MCG: 50 INJECTION INTRAMUSCULAR; INTRAVENOUS at 15:52

## 2024-03-15 RX ADMIN — LEVALBUTEROL HYDROCHLORIDE 1.25 MG: 1.25 SOLUTION RESPIRATORY (INHALATION) at 12:40

## 2024-03-15 RX ADMIN — LEVALBUTEROL HYDROCHLORIDE 1.25 MG: 1.25 SOLUTION RESPIRATORY (INHALATION) at 07:30

## 2024-03-15 RX ADMIN — LEVALBUTEROL HYDROCHLORIDE 1.25 MG: 1.25 SOLUTION RESPIRATORY (INHALATION) at 19:09

## 2024-03-15 RX ADMIN — IPRATROPIUM BROMIDE 0.5 MG: 0.5 SOLUTION RESPIRATORY (INHALATION) at 12:40

## 2024-03-15 RX ADMIN — POTASSIUM CHLORIDE 20 MEQ: 14.9 INJECTION, SOLUTION INTRAVENOUS at 11:27

## 2024-03-15 RX ADMIN — SULFAMETHOXAZOLE AND TRIMETHOPRIM 2 TABLET: 800; 160 TABLET ORAL at 21:28

## 2024-03-15 RX ADMIN — Medication 2 SPRAY: at 21:28

## 2024-03-15 RX ADMIN — CHLORHEXIDINE GLUCONATE 0.12% ORAL RINSE 15 ML: 1.2 LIQUID ORAL at 08:34

## 2024-03-15 RX ADMIN — METHYLPREDNISOLONE SODIUM SUCCINATE 50 MG: 125 INJECTION, POWDER, FOR SOLUTION INTRAMUSCULAR; INTRAVENOUS at 11:26

## 2024-03-15 RX ADMIN — Medication 2 SPRAY: at 09:13

## 2024-03-15 RX ADMIN — LAMOTRIGINE 150 MG: 100 TABLET ORAL at 08:31

## 2024-03-15 RX ADMIN — HEPARIN SODIUM 5000 UNITS: 5000 INJECTION INTRAVENOUS; SUBCUTANEOUS at 21:28

## 2024-03-15 RX ADMIN — NIRMATRELVIR AND RITONAVIR 3 TABLET: KIT at 18:28

## 2024-03-15 RX ADMIN — CHLORHEXIDINE GLUCONATE 0.12% ORAL RINSE 15 ML: 1.2 LIQUID ORAL at 21:28

## 2024-03-15 RX ADMIN — SODIUM CHLORIDE 14 UNITS/HR: 9 INJECTION, SOLUTION INTRAVENOUS at 05:29

## 2024-03-15 RX ADMIN — TOPIRAMATE 50 MG: 100 TABLET, FILM COATED ORAL at 18:28

## 2024-03-15 RX ADMIN — IPRATROPIUM BROMIDE 0.5 MG: 0.5 SOLUTION RESPIRATORY (INHALATION) at 19:09

## 2024-03-15 RX ADMIN — LAMOTRIGINE 150 MG: 100 TABLET ORAL at 18:28

## 2024-03-15 RX ADMIN — NYSTATIN: 100000 POWDER TOPICAL at 18:29

## 2024-03-15 RX ADMIN — METHYLPREDNISOLONE SODIUM SUCCINATE 50 MG: 125 INJECTION, POWDER, FOR SOLUTION INTRAMUSCULAR; INTRAVENOUS at 18:28

## 2024-03-15 RX ADMIN — IPRATROPIUM BROMIDE 0.5 MG: 0.5 SOLUTION RESPIRATORY (INHALATION) at 07:30

## 2024-03-15 RX ADMIN — OXYCODONE HYDROCHLORIDE 5 MG: 5 SOLUTION ORAL at 08:54

## 2024-03-15 RX ADMIN — SULFAMETHOXAZOLE AND TRIMETHOPRIM 2 TABLET: 800; 160 TABLET ORAL at 08:33

## 2024-03-15 RX ADMIN — OXYCODONE HYDROCHLORIDE 5 MG: 5 SOLUTION ORAL at 15:53

## 2024-03-15 RX ADMIN — HEPARIN SODIUM 5000 UNITS: 5000 INJECTION INTRAVENOUS; SUBCUTANEOUS at 13:29

## 2024-03-15 RX ADMIN — POTASSIUM CHLORIDE 40 MEQ: 29.8 INJECTION, SOLUTION INTRAVENOUS at 08:37

## 2024-03-15 RX ADMIN — Medication 2 SPRAY: at 13:29

## 2024-03-15 RX ADMIN — REMDESIVIR 100 MG: 100 INJECTION, POWDER, LYOPHILIZED, FOR SOLUTION INTRAVENOUS at 14:46

## 2024-03-15 RX ADMIN — POLYETHYLENE GLYCOL 3350 17 G: 17 POWDER, FOR SOLUTION ORAL at 08:32

## 2024-03-15 RX ADMIN — VENLAFAXINE 25 MG: 25 TABLET ORAL at 08:33

## 2024-03-15 RX ADMIN — NIRMATRELVIR AND RITONAVIR 3 TABLET: KIT at 08:32

## 2024-03-15 RX ADMIN — TOPIRAMATE 100 MG: 100 TABLET, FILM COATED ORAL at 08:32

## 2024-03-15 RX ADMIN — SODIUM CHLORIDE, SODIUM GLUCONATE, SODIUM ACETATE, POTASSIUM CHLORIDE, MAGNESIUM CHLORIDE, SODIUM PHOSPHATE, DIBASIC, AND POTASSIUM PHOSPHATE 500 ML: .53; .5; .37; .037; .03; .012; .00082 INJECTION, SOLUTION INTRAVENOUS at 08:49

## 2024-03-15 NOTE — PROGRESS NOTES
[facility]  Progress Note: Critical Care  Name: Carla Hunt 59 y.o. 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  Midline incision with wound dehiscence 2/2 poor wound healing s/p wound vac 3/13  ELIESER on CKD3  Anemia chronic inflammation  History of B-Cell lymphoma, Rituxan maintenance therapy on hold  Minimal SAH POA, no interventions required  Seizure disorder; on home AEDs  Steroid induced hyperglycemia; on insulin gtt   Elevated TG  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   vEEG 1/10-12 negative for seizure like activity   Only opens eyes to voice only and not following commands on frequent neuro checks despite prior sedation holiday after extubation 3/12, new from prior to intubation 3/11. PERRL  CTH 3/13 negative for acute intracranial findings.      Plan:  MRI brain w wo contrast to evaluate for encephalitis/inflammatory process   Frequent neurochecks q1h  Avoid PRN fentanyl/sedative meds as able   Continue home AEDs as below  Can consider LP if further clincally indicated pending workup  Video EEG deferred at thist time due to low suspicion for seizures     Diagnosis: Analgesia  PRN Fentanyl 50mcg/hr      Diagnosis: seizure  disorder  S/p partial left temporal lobe resection in 2007  On Lamictal 150 bid and Topamax 100 bid  Last lamotrigine level from 03/02 at 10.7 (therapeutic)     Diagnosis: Anxiety  On Effexor 25 mg QD     CV:   Diagnosis: Shock  Likely mixed distributive and hypovolemic  TTE 2/20 with EF 70%, no WMA, no significant valvular dysfunction, G1 DD.  Currently on Levophed at 1mcg.min  Plan:  Wean Levophed as able  Maintain MAPs > 65 mmHg      Pulm:  Diagnosis: Acute hypoxic respiratory failure   In setting of severe COVID, POA, persistently positive since admission.  Likely multifactorial due to COVID pneumonitis in setting of persistent COVID-19 infection and possible recurrent bacterial pneumonia.Persistent inflammation and fibrosis also likely given patient has been steroid responsive throughout admission.  Previously completed course of remdesivir/dexamethasone/Actemra.  Resumed Paxlovid and remdesivir after COVID PCR positive on 3/3. Has had multiple bronchoscopies throughout admission (2/2, 2/16, 2/21) subsequent Legionella/viral/fungal/AFB culture negative.    Previously intubated 2/13-3/1.  Reintubated 3/11 due to increased WOB in setting of elevated CRP; Mucopurulent secretions noted. Repeat BAL studies obtained 3/11.   S/P tracheostomy 3/12  CRP continues downward trend, most recent CRP 32, compared to 73.6 prior  Repeat CXR 3/12 with grossly unchanged diffuse parenchymal disease   Plan:  Continue Paxlovid/remdesivir through 3/15 to complete 14d course to tx COVID-19 infection  Vanco/Cefepime now stopped, appreciate pharm input for vanco dosing  Continue Bactrim 2 DS tabs bid crushed via NGT to tx Stenotrophomonas PNA as below  Follow-up BAL Cx's and studies including CD4/CD8 count, leukemia/lymphoma panel,PCP pcr  IV Solu-Medrol 50 mg q6h. Plan for slow steroid wean by 10mg every 3d until patient is down to 10mg q6h and then start decreasing frequency till patient is off  Will consider starting steroid sparing  therapy in consultation with pharmacy if patient able to tolerate steroid wean  Trend CRP daily to guide steroid therapy  ID following, recommend broadening antibiotic coverage from cefepime to meropenem 1 g IV Q8h if patient clinically deteriorates with concerns of progressive sepsis     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  Complicated by poor wound healing of midline incision as evidenced by wound dehiscence s/p wound vac   Stoma with dark brown output, consistent with prior  Plan:  Monitor output of ostomy pouch and Hemoglobin  Surgery following, will keep them updated if any further changes  Tube feeds running     :   Diagnosis: ELIESER on CKD III   Baseline Cr appears to be 0.8-1.2)  BUN/Cr , compared to 59/1.55 in setting of collapsed IVC collapsed on bedside US. Suggestive of prerenal etiology. S/P 1L IVF.  UO adequate on Ortiz, draining clear yellow urine    Plan:  Trend daily BMP  Continue to monitor I/O and UOP     F/E/N:   F: none  E: monitor and replete for goal K>4, P>3, Mg>2; Potassium repleted today  N: On tube feeds with vital 1.5; 35 cc/h, Prosource liquid protein to 1 packet BID     Heme/Onc:   Diagnosis: Anemia  Hgb recently has been around 7-8.  Transfused 2U on 3/13 due to Hgb <6.  Total of 6U transfused since admission  Likely multifactorial in nature, acute versus early nearly chronic in the setting of inflammatory state as evidenced by ferritin 974, likely further complicated by chronic anemia due to her other history of lymphoma and CKD  Plan:  Trend CBC, transfuse to maintain Hgb>7     Diagnosis: B cell lymphoma  Follows [facility] heme[facility]onc [facility]  S/P 6 cycles of chemotherapy   Maintained on Rituxan for 2 year course PTA, started May 2022  Last dose was 12/20, treatment currently on hold   Leukopenic on admission but WBC now limited likely in setting of steroid therapy     Plan:  Holding rituximab in setting of persistent COVID-19 infection       DVT ppx with lovenox     Endo:   Diagnosis: steroid hyperglycemia and diabetes mellitus type 2, A1c at 6.6 from 01/2024  Goal -180  BG range last 24hrs 140-200s  Plan:   On insulin gtt        ID:   Diagnosis: Recurrent bacterial pneumonia  Patient has completed multiple treatment courses of remdesivir throughout hospitalization in addition to 7 days of ceftriaxone.   BAL cultures in 2/2024 positive for MDR Pseudomonas and stenotrophomonas treated with 10d course of Levaquin  CT C/A/P 3/10 with persistent groundglass opacities and changes suggestive of sequelae of COVID, prior infections.  Completed 10d course of cefepime for broad spectrum coveragge (completed (3/13)  Repeat BAL cultures from 3/11 showing 4+ growth Stenotrophomonas maltophilia. Could be colonization given prior BAL growth of same, however due to recent intubation with prolonged corticosteroid theraphy, will begin treating as true pathogen. Patient otherwise remains afebrile, no leukocytosis. CRP with down trending.   Plan:  Bactrim 2 DS tabs BID crushed via NGT, plan to treat for 10d course through 3/23  Follow up BAL cultures and sensitivities        MSK/Skin:   Diagnosis: Midline incision wound with poor wound healing  General surgery performed staple removal of the patient's midline incision on 3/12 with some skin signs appreciated at the inferior aspect of the wound without obvious pus drainage. Overnight 3/13, wound dehiscence progressed requiring general surgery reevaluation. Red/brown aspirate noted, fascia intact. Wet-to-dry dressings in place. General surgery following for next Epson wound care.   Suspect poor wound healing in setting of high-dose steroid therapy.  No  exam no obvious signs of infection at this time.    S/P wound vac placement 3/13 as per gen surgery. No active concerns for cellulitis.   Plan:   Routine monitoring, wound care as per general surgery.     Diagnosis: Critical illness myopathy  Likely exacerbated by  high-dose steroid therapy  Plan:  Continue IV Solu-Medrol as above    Disposition: Critical care    ICU Core Measures     Vented Patient  VAP Bundle  VAP bundle ordered     A: Assess, Prevent, and Manage Pain Has pain been assessed? Yes  Need for changes to pain regimen? No   B: Both Spontaneous Awakening Trials (SATs) and Spontaneous Breathing Trials (SBTs) Plan to perform spontaneous awakening trial today? Yes   Plan to perform spontaneous breathing trial today? N/A   Obvious barriers to extubation? NA   C: Choice of Sedation RASS Goal: -1 Drowsy or 0 Alert and Calm  Need for changes to sedation or analgesia regimen? No   D: Delirium CAM-ICU: Unable to perform secondary to Acute cognitive dysfunction   E: Early Mobility  Plan for early mobility? Yes   F: Family Engagement Plan for family engagement today? Yes       Antibiotic Review: Patient on appropriate coverage based on culture data. , Awaiting culture results. , and Infectious disease consulted    Review of Invasive Devices:    Ortiz Plan: Continue for accurate I/O monitoring for 48 hours  Central access plan: Patient has multiple central venous catheters.   Amelia Plan: Keep arterial line for hemodynamic monitoring and frequent ABGs    Prophylaxis:  VTE VTE covered by:  enoxaparin, Subcutaneous, 40 mg at 03/14/24 1602       Stress Ulcer  not ordered        Significant 24hr Events     24hr events: No acute overnight events. Patient tolerated pressure support ventillation. No significant bloody output reported from ostomy site per RN.      Subjective     Review of Systems   Unable to perform ROS: Acuity of condition        Objective                            Vitals I/O      Most Recent Min/Max in 24hrs   Temp 98.4 °F (36.9 °C) Temp  Min: 98.2 °F (36.8 °C)  Max: 98.4 °F (36.9 °C)   Pulse 98 Pulse  Min: 83  Max: 108   Resp 14 Resp  Min: 12  Max: 40   /52 BP  Min: 117/52  Max: 117/52   O2 Sat 97 % SpO2  Min: 93 %  Max: 100 %      Intake/Output Summary (Last  24 hours) at 3/15/2024 0710  Last data filed at 3/15/2024 0543  Gross per 24 hour   Intake 2140.55 ml   Output 1750 ml   Net 390.55 ml       Diet Enteral/Parenteral; Tube Feeding No Oral Diet; Vital 1.5; Continuous; 45; Prosource Protein Liquid - One Packet; OD; 300; Water; Every 6 hours    Invasive Monitoring           Physical Exam   Physical Exam  Neck:      Vascular: Central line present.   Cardiovascular:      Rate and Rhythm: Normal rate.            Diagnostic Studies      EKG: Poor R Wave Progression  Nonspecific ST and T wave abnormality  Abnormal ECG  Confirmed by Danis Clifford ([id number]) on 3/12/2024 6:10:39 AM    Imaging: CXR 3/14 Worsening consolidation throughout the right lung and left lower lobe consistent with multi lobar pneumonia, possibly aspiration  I have personally reviewed pertinent reports.       Medications:  Scheduled PRN   chlorhexidine, 15 mL, Q12H SARTHAK  enoxaparin, 40 mg, Q24H SARTHAK  ipratropium, 0.5 mg, TID  lamoTRIgine, 150 mg, BID  levalbuterol, 1.25 mg, TID  methylPREDNISolone sodium succinate, 50 mg, Q6H SARTHAK  nirmatrelvir & ritonavir, 3 tablet, BID  nystatin, , BID  polyethylene glycol, 17 g, Daily  propofol, 100 mg, Once  remdesivir, 100 mg, Q24H  sodium chloride, 2 spray, Q4H  sulfamethoxazole-trimethoprim, 2 tablet, Q12H SARTHAK  topiramate, 100 mg, BID  venlafaxine, 25 mg, Daily      acetaminophen, 650 mg, Q6H PRN  fentaNYL, 50 mcg, Q1H PRN  OLANZapine, 10 mg, HS PRN  ondansetron, 4 mg, Q6H PRN  oxyCODONE, 2.5 mg, Q4H PRN   Or  oxyCODONE, 5 mg, Q4H PRN  sodium chloride, 1 Application, Q1H PRN       Continuous    fentaNYL, 50 mcg/hr, Last Rate: Stopped (03/14/24 1147)  insulin regular (HumuLIN R,NovoLIN R) 1 Units/mL in sodium chloride 0.9 % 100 mL infusion, 0.3-21 Units/hr, Last Rate: 8 Units/hr (03/15/24 0604)  norepinephrine, 1-30 mcg/min, Last Rate: Stopped (03/15/24 0525)  propofol, 5-50 mcg/kg/min, Last Rate: Stopped (03/14/24 1592)         Labs:    CBC    Recent Labs      03/14/24  0542 03/15/24  0603   WBC 10.46* 7.59   HGB 7.4* 7.1*   HCT 22.5* 21.2*    118*     CRP: 18 (<32<74<106<166)   BMP    Recent Labs     03/14/24  0542 03/15/24  0603   SODIUM 145 138   K 3.2* 3.3*   * 109*   CO2 19* 18*   AGAP 14* 11   BUN 59* 61*   CREATININE 1.55* 1.59*   CALCIUM 7.7* 7.6*      BS trend: 140-190s     Coags    No recent results     Additional Electrolytes  Recent Labs     03/14/24  0542 03/15/24  0603   MG 2.0 1.9   PHOS 4.0  4.0 3.8   CAIONIZED 1.12  --           Blood Gas    Recent Labs     03/14/24  1840   PHART 7.330*   [id number] 36.3   PO2ART 90.7   [id number] 18.7*   BEART -6.6   SOURCE Line, Arterial     Recent Labs     03/14/24  1840   SOURCE Line, Arterial    LFTs  No recent results        Infectious  Bronchial culture with 4+ Stenotrophomonas, 1+ budding yeast     AFB/fungal/viral bronchial Cx's pending  PCP smear (BAL) negative   Glucose  Recent Labs     03/14/24  0542 03/15/24  0603   GLUC 141* 133               Joe Lazcano DO

## 2024-03-15 NOTE — RESPIRATORY THERAPY NOTE
RT Ventilator Management Note  Carla Hunt 59 y.o. female MRN: [id number]  Unit/Bed#: [facility] 12 Encounter: [id number]      Daily Screen         3/14/2024  0718 3/15/2024  0733          Patient safety screen outcome:: Failed Failed      Not Ready for Weaning due to:: Underline problem not resolved --                Physical Exam:   Assessment Type: Assess only  General Appearance: Sleeping  Respiratory Pattern: Assisted, Spontaneous  Chest Assessment: Chest expansion symmetrical  Bilateral Breath Sounds: Diminished, Clear  O2 Device: vent      Resp Comments: Pt received on PS 12/6. Tolerating well. No changes at this time. RT will cont to monitor.     03/15/24 0733   Respiratory Assessment   Resp Comments Pt received on PS 12/6. Tolerating well. No changes at this time. RT will cont to monitor.   Vent Information   Vent ID [id number]   Vent type Summers G5   Summers Vent Mode SPONT   $ Pulse Oximetry Spot Check Charge Completed   SPONT Settings   FIO2 (%) 40 %   PEEP (cmH2O) 6 cmH2O   Pressure Support (cmH2O) 12 cmH20   Flow Trigger (LPM) 5 LPM   P-ramp (ms) 50 ms   ETS  (%) 25 %   Humidification Heater   Heater Temp 95 °F (35 °C)   SPONT Actuals   Resp Rate (BPM) 18 BPM   VT (mL) 771 mL   MV (Obs) 13.1   MAP (cmH2O) 8.8 cmH2O   Peak Pressure (cmH2O) 20 cmH2O   I:E Ratio (Obs) 1:5   RSBI (f/vt) 22 f/Vt   Heater Temperature (Obs) 95 °F (35 °C)   SPONT Alarms   High Peak Pressure (cmH20) 40 cmH2O   High Resp Rate (BPM) 45 BPM   High MV (L/min) 20 L/min   Low MV (L/min) 5 L/min   High Spont VTE (mL) 1000 mL   Low Spont VTE (mL) 250 mL   Apnea Time (S) 20 S   SPONT Apnea Settings   Resp Rate (BPM) 15 BPM   FIO2 (%) 40 %   %TI (%) 1.3 %   Apnea Time (S) 20 S   Maintenance   Alarm (pink) cable attached No   Resuscitation bag with peep valve at bedside Yes   Water bag changed No   Circuit changed No   Daily Screen   Patient safety screen outcome: Failed   IHI Ventilator Associated Pneumonia Bundle   Daily Assessment of  Readiness to Extubate Yes   Surgical Airway Shiley Cuffed   Placement Date/Time: 03/12/24 1200   Tube Size: 8 mm  Type: Tracheostomy  Brand: Naveen  Style: Cuffed   Status Cuff Inflated;Secured   Surgical Airway Cuff Pressure (color) Green   Equipment at bedside BVM;Wall Suction setup;Additional complete same size trach tube;Additional complete one size smaller trach tube;Obturator;Sterile saline;Additional inner cannula

## 2024-03-15 NOTE — PLAN OF CARE
Problem: Prexisting or High Potential for Compromised Skin Integrity  Goal: Skin integrity is maintained or improved  Description: INTERVENTIONS:  - Identify patients at risk for skin breakdown  - Assess and monitor skin integrity  - Assess and monitor nutrition and hydration status  - Monitor labs   - Assess for incontinence   - Turn and reposition patient  - Assist with mobility/ambulation  - Relieve pressure over bony prominences  - Avoid friction and shearing  - Provide appropriate hygiene as needed including keeping skin clean and dry  - Evaluate need for skin moisturizer/barrier cream  - Collaborate with interdisciplinary team   - Patient/family teaching  - Consider wound care consult   Outcome: Progressing     Problem: Nutrition/Hydration-ADULT  Goal: Nutrient/Hydration intake appropriate for improving, restoring or maintaining nutritional needs  Description: Monitor and assess patient's nutrition/hydration status for malnutrition. Collaborate with interdisciplinary team and initiate plan and interventions as ordered.  Monitor patient's weight and dietary intake as ordered or per policy. Utilize nutrition screening tool and intervene as necessary. Determine patient's food preferences and provide high-protein, high-caloric foods as appropriate.     INTERVENTIONS:  - Monitor oral intake, urinary output, labs, and treatment plans  - Assess nutrition and hydration status and recommend course of action  - Evaluate amount of meals eaten  - Assist patient with eating if necessary   - Allow adequate time for meals  - Recommend/ encourage appropriate diets, oral nutritional supplements, and vitamin/mineral supplements  - Order, calculate, and assess calorie counts as needed  - Recommend, monitor, and adjust tube feedings and TPN/PPN based on assessed needs  - Assess need for intravenous fluids  - Provide specific nutrition/hydration education as appropriate  - Include patient/family/caregiver in decisions related to  nutrition  Outcome: Progressing     Problem: SAFETY,RESTRAINT: NV/NON-SELF DESTRUCTIVE BEHAVIOR  Goal: Remains free of harm/injury (restraint for non violent/non self-detsructive behavior)  Description: INTERVENTIONS:  - Instruct patient/family regarding restraint use   - Assess and monitor physiologic and psychological status   - Provide interventions and comfort measures to meet assessed patient needs   - Identify and implement measures to help patient regain control  - Assess readiness for release of restraint   Outcome: Progressing  Goal: Returns to optimal restraint-free functioning  Description: INTERVENTIONS:  - Assess the patient's behavior and symptoms that indicate continued need for restraint  - Identify and implement measures to help patient regain control  - Assess readiness for release of restraint   Outcome: Progressing     Problem: INFECTION - ADULT  Goal: Absence or prevention of progression during hospitalization  Description: INTERVENTIONS:  - Assess and monitor for signs and symptoms of infection  - Monitor lab/diagnostic results  - Monitor all insertion sites, i.e. indwelling lines, tubes, and drains  - Monitor endotracheal if appropriate and nasal secretions for changes in amount and color  - Stuart appropriate cooling/warming therapies per order  - Administer medications as ordered  - Instruct and encourage patient and family to use good hand hygiene technique  - Identify and instruct in appropriate isolation precautions for identified infection/condition  Outcome: Progressing     Problem: SAFETY ADULT  Goal: Patient will remain free of falls  Description: INTERVENTIONS:  - Educate patient/family on patient safety including physical limitations  - Instruct patient to call for assistance with activity   - Consult OT/PT to assist with strengthening/mobility   - Keep Call bell within reach  - Keep bed low and locked with side rails adjusted as appropriate  - Keep care items and personal  belongings within reach  - Initiate and maintain comfort rounds  - Make Fall Risk Sign visible to staff  - Offer Toileting every 2 Hours, in advance of need  - Initiate/Maintain bed alarm  - Obtain necessary fall risk management equipment: non skid footwear  - Apply yellow socks and bracelet for high fall risk patients  - Consider moving patient to room near nurses station  Outcome: Progressing     Problem: RESPIRATORY - ADULT  Goal: Achieves optimal ventilation and oxygenation  Description: INTERVENTIONS:  - Assess for changes in respiratory status  - Assess for changes in mentation and behavior  - Position to facilitate oxygenation and minimize respiratory effort  - Oxygen administered by appropriate delivery if ordered  - Initiate smoking cessation education as indicated  - Encourage broncho-pulmonary hygiene including cough, deep breathe, Incentive Spirometry  - Assess the need for suctioning and aspirate as needed  - Assess and instruct to report SOB or any respiratory difficulty  - Respiratory Therapy support as indicated  Outcome: Progressing     Problem: SKIN/TISSUE INTEGRITY - ADULT  Goal: Skin Integrity remains intact(Skin Breakdown Prevention)  Description: Assess:  -Perform Flavio assessment every shift   -Clean and moisturize skin every day   -Inspect skin when repositioning, toileting, and assisting with ADLS  -Assess under medical devices such as ronny  every hour   -Assess extremities for adequate circulation and sensation     Bed Management:  -Have minimal linens on bed & keep smooth, unwrinkled  -Change linens as needed when moist or perspiring  -Avoid sitting or lying in one position for more than 2 hours while in bed  -Keep HOB at 45 degrees     Toileting:  -Offer bedside commode  -Assess for incontinence every hour  -Use incontinent care products after each incontinent episode such as moisture barrier cream     Activity:  -Mobilize patient 3 times a day  -Encourage activity and walks on  unit  -Encourage or provide ROM exercises   -Turn and reposition patient every 2 Hours  -Use appropriate equipment to lift or move patient in bed  -Instruct/ Assist with weight shifting every hour when out of bed in chair  -Consider limitation of chair time 2 hour intervals    Skin Care:  -Avoid use of baby powder, tape, friction and shearing, hot water or constrictive clothing  -Relieve pressure over bony prominences using allevyn   -Do not massage red bony areas    Next Steps:  -Teach patient strategies to minimize risks such as weight shifting    -Consider consults to  interdisciplinary teams such as PT  Outcome: Progressing  Goal: Incision(s), wounds(s) or drain site(s) healing without S/S of infection  Description: INTERVENTIONS  - Assess and document dressing, incision, wound bed, drain sites and surrounding tissue  - Provide patient and family education  Outcome: Progressing  Goal: Pressure injury heals and does not worsen  Description: Interventions:  - Implement low air loss mattress or specialty surface (Criteria met)  - Apply silicone foam dressing  - Apply fecal or urinary incontinence containment device   - Utilize friction reducing device or surface for transfers   - Consider nutrition services referral as needed  Outcome: Progressing     Problem: NEUROSENSORY - ADULT  Goal: Achieves stable or improved neurological status  Description: INTERVENTIONS  - Monitor and report changes in neurological status  - Monitor vital signs such as temperature, blood pressure, glucose, and any other labs ordered   - Initiate measures to prevent increased intracranial pressure  - Monitor for seizure activity and implement precautions if appropriate      Outcome: Progressing  Goal: Achieves maximal functionality and self care  Description: INTERVENTIONS  - Monitor swallowing and airway patency with patient fatigue and changes in neurological status  - Encourage and assist patient to increase activity and self care.   -  Encourage visually impaired, hearing impaired and aphasic patients to use assistive/communication devices  Outcome: Progressing     Problem: CARDIOVASCULAR - ADULT  Goal: Maintains optimal cardiac output and hemodynamic stability  Description: INTERVENTIONS:  - Monitor I/O, vital signs and rhythm  - Monitor for S/S and trends of decreased cardiac output  - Administer and titrate ordered vasoactive medications to optimize hemodynamic stability  - Assess quality of pulses, skin color and temperature  - Assess for signs of decreased coronary artery perfusion  - Instruct patient to report change in severity of symptoms  Outcome: Progressing  Goal: Absence of cardiac dysrhythmias or at baseline rhythm  Description: INTERVENTIONS:  - Continuous cardiac monitoring, vital signs, obtain 12 lead EKG if ordered  - Administer antiarrhythmic and heart rate control medications as ordered  - Monitor electrolytes and administer replacement therapy as ordered  Outcome: Progressing     Problem: GASTROINTESTINAL - ADULT  Goal: Minimal or absence of nausea and/or vomiting  Description: INTERVENTIONS:  - Administer IV fluids if ordered to ensure adequate hydration  - Maintain NPO status until nausea and vomiting are resolved  - Nasogastric tube if ordered  - Administer ordered antiemetic medications as needed  - Provide nonpharmacologic comfort measures as appropriate  - Advance diet as tolerated, if ordered  - Consider nutrition services referral to assist patient with adequate nutrition and appropriate food choices  Outcome: Progressing  Goal: Maintains or returns to baseline bowel function  Description: INTERVENTIONS:  - Assess bowel function  - Encourage oral fluids to ensure adequate hydration  - Administer IV fluids if ordered to ensure adequate hydration  - Administer ordered medications as needed  - Encourage mobilization and activity  - Consider nutritional services referral to assist patient with adequate nutrition and  appropriate food choices  Outcome: Progressing  Goal: Maintains adequate nutritional intake  Description: INTERVENTIONS:  - Monitor percentage of each meal consumed  - Identify factors contributing to decreased intake, treat as appropriate  - Assist with meals as needed  - Monitor I&O, weight, and lab values if indicated  - Obtain nutrition services referral as needed  Outcome: Progressing  Goal: Establish and maintain optimal ostomy function  Description: INTERVENTIONS:  - Assess bowel function  - Encourage oral fluids to ensure adequate hydration  - Administer IV fluids if ordered to ensure adequate hydration   - Administer ordered medications as needed  - Encourage mobilization and activity  - Nutrition services referral to assist patient with appropriate food choices  - Assess stoma site  - Consider wound care consult   Outcome: Progressing  Goal: Oral mucous membranes remain intact  Description: INTERVENTIONS  - Assess oral mucosa and hygiene practices  - Implement preventative oral hygiene regimen  - Implement oral medicated treatments as ordered  - Initiate Nutrition services referral as needed  Outcome: Progressing     Problem: GENITOURINARY - ADULT  Goal: Maintains or returns to baseline urinary function  Description: INTERVENTIONS:  - Assess urinary function  - Encourage oral fluids to ensure adequate hydration if ordered  - Administer IV fluids as ordered to ensure adequate hydration  - Administer ordered medications as needed  - Offer frequent toileting  - Follow urinary retention protocol if ordered  Outcome: Progressing  Goal: Urinary catheter remains patent  Description: INTERVENTIONS:  - Assess patency of urinary catheter  - If patient has a chronic marie, consider changing catheter if non-functioning  - Follow guidelines for intermittent irrigation of non-functioning urinary catheter  Outcome: Progressing     Problem: METABOLIC, FLUID AND ELECTROLYTES - ADULT  Goal: Electrolytes maintained within  normal limits  Description: INTERVENTIONS:  - Monitor labs and assess patient for signs and symptoms of electrolyte imbalances  - Administer electrolyte replacement as ordered  - Monitor response to electrolyte replacements, including repeat lab results as appropriate  - Instruct patient on fluid and nutrition as appropriate  Outcome: Progressing  Goal: Fluid balance maintained  Description: INTERVENTIONS:  - Monitor labs   - Monitor I/O and WT  - Instruct patient on fluid and nutrition as appropriate  - Assess for signs & symptoms of volume excess or deficit  Outcome: Progressing  Goal: Glucose maintained within target range  Description: INTERVENTIONS:  - Monitor Blood Glucose as ordered  - Assess for signs and symptoms of hyperglycemia and hypoglycemia  - Administer ordered medications to maintain glucose within target range  - Assess nutritional intake and initiate nutrition service referral as needed  Outcome: Progressing     Problem: HEMATOLOGIC - ADULT  Goal: Maintains hematologic stability  Description: INTERVENTIONS  - Assess for signs and symptoms of bleeding or hemorrhage  - Monitor labs  - Administer supportive blood products/factors as ordered and appropriate  Outcome: Progressing     Problem: MUSCULOSKELETAL - ADULT  Goal: Maintain or return mobility to safest level of function  Description: INTERVENTIONS:  - Assess patient's ability to carry out ADLs; assess patient's baseline for ADL function and identify physical deficits which impact ability to perform ADLs (bathing, care of mouth/teeth, toileting, grooming, dressing, etc.)  - Assess/evaluate cause of self-care deficits   - Assess range of motion  - Assess patient's mobility  - Assess patient's need for assistive devices and provide as appropriate  - Encourage maximum independence but intervene and supervise when necessary  - Involve family in performance of ADLs  - Assess for home care needs following discharge   - Consider OT consult to assist  with ADL evaluation and planning for discharge  - Provide patient education as appropriate  Outcome: Progressing     Problem: COPING  Goal: Pt/Family able to verbalize concerns and demonstrate effective coping strategies  Description: INTERVENTIONS:  - Assist patient/family to identify coping skills, available support systems and cultural and spiritual values  - Provide emotional support, including active listening and acknowledgement of concerns of patient and caregivers  - Reduce environmental stimuli, as able  - Provide patient education  - Assess for spiritual pain/suffering and initiate spiritual care, including notification of Pastoral Care or natalia based community as needed  - Assess effectiveness of coping strategies  Outcome: Progressing  Goal: Will report anxiety at manageable levels  Description: INTERVENTIONS:  - Administer medication as ordered  - Teach and encourage coping skills  - Provide emotional support  - Assess patient/family for anxiety and ability to cope  Outcome: Progressing     Problem: BEHAVIOR  Goal: Pt/Family maintain appropriate behavior and adhere to behavioral management agreement, if implemented  Description: INTERVENTIONS:  - Assess the family dynamic   - Encourage verbalization of thoughts and concerns in a socially appropriate manner  - Assess patient/family's coping skills and non-compliant behavior (including use of illegal substances).  - Utilize positive, consistent limit setting strategies supporting safety of patient, staff and others  - Initiate consult with Case Management, Spiritual Care or other ancillary services as appropriate  - If a patient's/visitor's behavior jeopardizes the safety of the patient, staff, or others, refer to organization procedure.   - Notify Security of behavior or suspected illegal substances which indicate the need for search of the patient and/or belongings  - Encourage participation in the decision making process about a behavioral management  agreement; implement if patient meets criteria  Outcome: Progressing     Problem: Potential for Falls  Goal: Patient will remain free of falls  Description: INTERVENTIONS:  - Educate patient/family on patient safety including physical limitations  - Instruct patient to call for assistance with activity   - Consult OT/PT to assist with strengthening/mobility   - Keep Call bell within reach  - Keep bed low and locked with side rails adjusted as appropriate  - Keep care items and personal belongings within reach  - Initiate and maintain comfort rounds  - Make Fall Risk Sign visible to staff  - Offer Toileting every 2 Hours, in advance of need  - Initiate/Maintain bed alarm  - Obtain necessary fall risk management equipment: non skid footwear  - Apply yellow socks and bracelet for high fall risk patients  - Consider moving patient to room near nurses station  Outcome: Progressing

## 2024-03-15 NOTE — QUICK NOTE
3/15/2024 8:07 AM -  Carla Stockkaterina's chart and case were reviewed by Hannah Ramirez PA-C.  Mode of review included electronic chart check.    No documented events overnight. Left message on machine for , Mitchell, yesterday evening to offer support. Palliative available for support if needed. Otherwise will return on Monday 3/18.       For urgent issues or any questions/concerns, please notify on-call provider via [employer].  You may also call our answering service 24/7 at [phone].    Hannah Ramirez PA-C  Palliative and Supportive Care  Clinic/Answering Service: [phone]  You can find me on [employer]!

## 2024-03-15 NOTE — PROCEDURES
General Surgery  Carla Hunt 59 y.o. female MRN: [id number]  Unit/Bed#: [facility] 12 Encounter: [id number]     Wound Check Progress Note     Location of wound:   abdomen  See images below     All dressings removed. Midline incision with skin dehiscence. Fascia intact. Midline wound measures 10 cm x 4.5 cm x 4 cm    The wound is clean. There is no estefany-skin cellulitis or concerns for abscess or infection. Her ostomy is viable and productive. No continued sloughing.     New wound vac applied. Adaptic placed overlying fascia. Single small piece of white foam placed within area of fat necrosis as barrier and sutured to black foam. Black foam bridge placed for a total of 1 piece of white foam, 1 black sponge inside, 1 black sponge bridge. New ostomy appliance placed.    Patient tolerated procedure well.    Images:      Nancy Pond MD  3/15/2024

## 2024-03-15 NOTE — RESTORATIVE TECHNICIAN NOTE
"       Restorative Technician Note      Patient Name: Carla Hunt     Restorative Tech Visit Date: 03/15/24  Note Type: Bracing, Initial consult  Patient Position Upon Consult: Supine  Brace Applied: Other (Comment) (Resting Hand Splint; B/L, size Medium)  Additional Brace Ordered: No  Patient Position When Brace Applied: Supine  Bracing Recommendations: None  Education Provided: Yes  Patient Position at End of Consult: Supine; All needs within reach; Bed/Chair alarm activated  Nurse Communication: Nurse aware of consult, application of brace    Please contact Mobility Coordinator on Tiger text \"SLB-PT-Restorative Tech\" role in regards to bracing instruction and/or adjustment.    Marixa العراقي, Restorative Tech             "

## 2024-03-15 NOTE — PHYSICAL THERAPY NOTE
PT Treatment       03/15/24 1111   PT Last Visit   PT Visit Date 03/15/24   Note Type   Note Type Treatment   Pain Assessment   Pain Assessment Tool FLACC   Pain Rating: FLACC (Rest) - Face 0   Pain Rating: FLACC (Rest) - Legs 0   Pain Rating: FLACC (Rest) - Activity 0   Pain Rating: FLACC (Rest) - Cry 0   Pain Rating: FLACC (Rest) - Consolability 0   Score: FLACC (Rest) 0   Pain Rating: FLACC (Activity) - Face 1   Pain Rating: FLACC (Activity) - Legs 0   Pain Rating: FLACC (Activity) - Activity 1   Pain Rating: FLACC (Activity) - Cry 0   Pain Rating: FLACC (Activity) - Consolability 0   Score: FLACC (Activity) 2   Restrictions/Precautions   Weight Bearing Precautions Per Order No   Braces or Orthoses Other (Comment)  (b/l resting hand splints)   Other Precautions Contact/isolation;Airborne/isolation;Cognitive;Bed Alarm;Multiple lines;Telemetry;O2;Fall Risk;Pain   General   Chart Reviewed Yes   Family/Caregiver Present Yes  ()   Cognition   Overall Cognitive Status Impaired   Arousal/Participation Sedated;Poorly responsive   Attention Difficulty attending to directions   Orientation Level Oriented to person   Memory Unable to assess   Following Commands Follows one step commands inconsistently   Comments appears more alert than previous session; eyes open more that before; slight nods to questions; moved head independently   Subjective   Subjective pt's eyes open upon enterance   Bed Mobility   Supine to Sit 1  Dependent   Additional items Assist x 2   Sit to Supine 1  Dependent   Additional items Assist x 2   Additional Comments HR increased to 150s sitting EOB, pt returned to supine position and vitals improved within a few minutes and was able to continue treatment w/o any other issues   Transfers   Sit to Stand Unable to assess   Ambulation/Elevation   Gait pattern Not appropriate   Balance   Static Sitting Poor -   Endurance Deficit   Endurance Deficit Yes   Endurance Deficit Description limited due  to fatigue, weakness, and cognitive deficits   Activity Tolerance   Activity Tolerance Patient limited by fatigue;Treatment limited secondary to medical complications (Comment)  (HR increased at EOB)   Medical Staff Made Aware OT   Nurse Made Aware yes   Exercises   Hip Flexion Sitting;20 reps;Bilateral;PROM  (1 x 10 hip flexion; 1 x 10 D1)   Knee AROM Long Arc Quad Sitting;10 reps;Bilateral;PROM   Heel Cord Stretch Sitting;20 reps;PROM;Bilateral   Assessment   Prognosis Fair   Problem List Decreased strength;Decreased range of motion;Decreased endurance;Impaired balance;Decreased mobility;Decreased cognition;Decreased coordination;Impaired judgement;Decreased safety awareness;Decreased skin integrity;Pain   Assessment Pt appears more awake and alert compared to previous session. PT session focused on sitting tolerance and LE exercises. Pt did tolerate treatment well today, but was limited due to vitals instability (HR increased to 150s). Pt was able to perform a supine to sit dependent x 2 and HR increased to 150s. Pt was dependent x 2 to put supine again and vitals improved after a few minutes. Bed was placed into a chair position and pt performed LE exercises. Pt was able to open eyes more frequently this session and shaked head a few times. Pt would continue to benefit from skilled PT. Pt will continue to be seen upon admission. Pt's prognosis is Fair secondary to age, PLOF, medical complications, and PMH. The patient's AM-PAC Basic Mobility Inpatient Standardized Score is less than 42.9, suggesting this patient may benefit from discharge to post-acute rehabilitation services. Please also refer to the recommendation of the Physical Therapist for safe discharge planning.   Goals   Patient Goals none stated at this time   [facility] Expiration Date 03/19/24   Plan   Treatment/Interventions Functional transfer training;LE strengthening/ROM;Therapeutic exercise;Endurance training;Equipment eval/education;Gait training;Bed  mobility;OT;Spoke to nursing   Progress Slow progress, medical status limitations   PT Frequency 2-3x/wk   Discharge Recommendation   Rehab Resource Intensity Level, PT I (Maximum Resource Intensity)   AM-PAC Basic Mobility Inpatient   Turning in Flat Bed Without Bedrails 1   Lying on Back to Sitting on Edge of Flat Bed Without Bedrails 1   Moving Bed to Chair 1   Standing Up From Chair Using Arms 1   Walk in Room 1   Climb 3-5 Stairs With Railing 1   Basic Mobility Inpatient Raw Score 6   Turning Head Towards Sound 1   Follow Simple Instructions 1   Low Function Basic Mobility Raw Score  8   Low Function Basic Mobility Standardized Score  10.37   [facility] Highest Level Of Mobility   -HLM Goal 2: Bed activities/Dependent transfer   -HLM Achieved 3: Sit at edge of bed     Karlene Mata, SPT

## 2024-03-15 NOTE — PLAN OF CARE
Problem: OCCUPATIONAL THERAPY ADULT  Goal: Performs self-care activities at highest level of function for planned discharge setting.  See evaluation for individualized goals.  Description: Treatment Interventions: ADL retraining, Functional transfer training, UE strengthening/ROM, Endurance training, Patient/family training, Equipment evaluation/education, Compensatory technique education, Continued evaluation, Energy conservation, Activityengagement          See flowsheet documentation for full assessment, interventions and recommendations.   3/15/2024 1540 by Ian Epps  Outcome: Progressing  Note: Limitation: Decreased ADL status, Decreased UE ROM, Decreased UE strength, Decreased Safe judgement during ADL, Decreased cognition, Decreased endurance, Decreased self-care trans, Decreased high-level ADLs, Non-func R UE, Non-func L UE  Prognosis: Fair, Poor  Assessment: Pt was seen for a skilled OT treatment session on 3/15/24, focusing on sitting tolerance and therapeutic activities to increase strength/ROM of B/L UEs for engagement in functional seated tasks. Pt was greeted supine in bed. When attempted to sit EOB, pt HR increased to 150s, was returned supine in bed. Once HR returned WNL, pt was placed in chair position in bed. Pt tolerated position well, HR remaining WNL. Pt participated in B/L UE/LE PROM therapeutic exercise to maintain PROM, prevent contractures, and strengthen B/L UEs. Pt was observed slightly moving RUE against gravity in response to discomfort, but when prompted to move B/L UEs, pt was unable. Pt remained in chair position at end of session. OT will continue to monitor/treat.     Rehab Resource Intensity Level, OT: II (Moderate Resource Intensity)

## 2024-03-15 NOTE — PLAN OF CARE
Problem: Prexisting or High Potential for Compromised Skin Integrity  Goal: Skin integrity is maintained or improved  Description: INTERVENTIONS:  - Identify patients at risk for skin breakdown  - Assess and monitor skin integrity  - Assess and monitor nutrition and hydration status  - Monitor labs   - Assess for incontinence   - Turn and reposition patient  - Assist with mobility/ambulation  - Relieve pressure over bony prominences  - Avoid friction and shearing  - Provide appropriate hygiene as needed including keeping skin clean and dry  - Evaluate need for skin moisturizer/barrier cream  - Collaborate with interdisciplinary team   - Patient/family teaching  - Consider wound care consult   Outcome: Progressing     Problem: INFECTION - ADULT  Goal: Absence or prevention of progression during hospitalization  Description: INTERVENTIONS:  - Assess and monitor for signs and symptoms of infection  - Monitor lab/diagnostic results  - Monitor all insertion sites, i.e. indwelling lines, tubes, and drains  - Monitor endotracheal if appropriate and nasal secretions for changes in amount and color  - [place] appropriate cooling/warming therapies per order  - Administer medications as ordered  - Instruct and encourage patient and family to use good hand hygiene technique  - Identify and instruct in appropriate isolation precautions for identified infection/condition  Outcome: Progressing     Problem: RESPIRATORY - ADULT  Goal: Achieves optimal ventilation and oxygenation  Description: INTERVENTIONS:  - Assess for changes in respiratory status  - Assess for changes in mentation and behavior  - Position to facilitate oxygenation and minimize respiratory effort  - Oxygen administered by appropriate delivery if ordered  - Initiate smoking cessation education as indicated  - Encourage broncho-pulmonary hygiene including cough, deep breathe, Incentive Spirometry  - Assess the need for suctioning and aspirate as needed  -  Assess and instruct to report SOB or any respiratory difficulty  - Respiratory Therapy support as indicated  Outcome: Progressing     Problem: NEUROSENSORY - ADULT  Goal: Achieves stable or improved neurological status  Description: INTERVENTIONS  - Monitor and report changes in neurological status  - Monitor vital signs such as temperature, blood pressure, glucose, and any other labs ordered   - Initiate measures to prevent increased intracranial pressure  - Monitor for seizure activity and implement precautions if appropriate      Outcome: Progressing  Goal: Achieves maximal functionality and self care  Description: INTERVENTIONS  - Monitor swallowing and airway patency with patient fatigue and changes in neurological status  - Encourage and assist patient to increase activity and self care.   - Encourage visually impaired, hearing impaired and aphasic patients to use assistive/communication devices  Outcome: Progressing     Problem: GASTROINTESTINAL - ADULT  Goal: Minimal or absence of nausea and/or vomiting  Description: INTERVENTIONS:  - Administer IV fluids if ordered to ensure adequate hydration  - Maintain NPO status until nausea and vomiting are resolved  - Nasogastric tube if ordered  - Administer ordered antiemetic medications as needed  - Provide nonpharmacologic comfort measures as appropriate  - Advance diet as tolerated, if ordered  - Consider nutrition services referral to assist patient with adequate nutrition and appropriate food choices  Outcome: Progressing  Goal: Maintains or returns to baseline bowel function  Description: INTERVENTIONS:  - Assess bowel function  - Encourage oral fluids to ensure adequate hydration  - Administer IV fluids if ordered to ensure adequate hydration  - Administer ordered medications as needed  - Encourage mobilization and activity  - Consider nutritional services referral to assist patient with adequate nutrition and appropriate food choices  Outcome:  Progressing  Goal: Maintains adequate nutritional intake  Description: INTERVENTIONS:  - Monitor percentage of each meal consumed  - Identify factors contributing to decreased intake, treat as appropriate  - Assist with meals as needed  - Monitor I&O, weight, and lab values if indicated  - Obtain nutrition services referral as needed  Outcome: Progressing  Goal: Establish and maintain optimal ostomy function  Description: INTERVENTIONS:  - Assess bowel function  - Encourage oral fluids to ensure adequate hydration  - Administer IV fluids if ordered to ensure adequate hydration   - Administer ordered medications as needed  - Encourage mobilization and activity  - Nutrition services referral to assist patient with appropriate food choices  - Assess stoma site  - Consider wound care consult   Outcome: Progressing  Goal: Oral mucous membranes remain intact  Description: INTERVENTIONS  - Assess oral mucosa and hygiene practices  - Implement preventative oral hygiene regimen  - Implement oral medicated treatments as ordered  - Initiate Nutrition services referral as needed  Outcome: Progressing     Problem: HEMATOLOGIC - ADULT  Goal: Maintains hematologic stability  Description: INTERVENTIONS  - Assess for signs and symptoms of bleeding or hemorrhage  - Monitor labs  - Administer supportive blood products/factors as ordered and appropriate  Outcome: Progressing

## 2024-03-15 NOTE — PLAN OF CARE
Problem: OCCUPATIONAL THERAPY ADULT  Goal: Performs self-care activities at highest level of function for planned discharge setting.  See evaluation for individualized goals.  Description: Treatment Interventions: ADL retraining, Functional transfer training, UE strengthening/ROM, Endurance training, Patient/family training, Equipment evaluation/education, Compensatory technique education, Continued evaluation, Energy conservation, Activityengagement          See flowsheet documentation for full assessment, interventions and recommendations.   Outcome: Progressing  Note: Limitation: Decreased ADL status, Decreased UE ROM, Decreased UE strength, Decreased Safe judgement during ADL, Decreased cognition, Decreased endurance, Decreased self-care trans, Decreased high-level ADLs, Non-func R UE, Non-func L UE  Prognosis: Fair, Poor  Assessment: Pt was seen for a skilled OT treatment session on 3/15/24, focusing on sitting tolerance and therapeutic activities to increase strength/ROM of B/L UEs for engagement in functional seated tasks. Pt was greeted supine in bed. When attempted to sit EOB, pt HR increased to 150s, was returned supine in bed. Once HR returned WNL, pt was placed in chair position in bed. Pt tolerated position well, HR remaining WNL. Pt participated in B/L UE/LE PROM therapeutic exercise to maintain PROM, prevent contractures, and strengthen B/L UEs. Pt was observed slightly moving RUE against gravity in response to discomfort, but when prompted to move B/L UEs, pt was unable. Pt remained in chair position at end of session. OT will continue to monitor/treat.     Rehab Resource Intensity Level, OT: II (Moderate Resource Intensity)

## 2024-03-15 NOTE — RESPIRATORY THERAPY NOTE
RT Ventilator Management Note  Carla Hunt 59 y.o. female MRN: [id number]  Unit/Bed#: [facility] [place] [facility] Encounter: [id number]      Daily Screen         3/13/2024  1923 3/14/2024  0718          Patient safety screen outcome:: Failed Failed      Not Ready for Weaning due to:: Underline problem not resolved Underline problem not resolved                Physical Exam:   Assessment Type: Assess only  General Appearance: Sleeping  Respiratory Pattern: Assisted, Spontaneous  Chest Assessment: Chest expansion symmetrical  Bilateral Breath Sounds: Diminished, Clear  O2 Device: (P) vent      Resp Comments: (P) No changes made to the pts vent overnight. PT has been stable on PS settings. Will cont to monitor pt per protocol

## 2024-03-15 NOTE — PLAN OF CARE
Problem: PHYSICAL THERAPY ADULT  Goal: Performs mobility at highest level of function for planned discharge setting.  See evaluation for individualized goals.  Description:    Equipment Recommended:  (none)       See flowsheet documentation for full assessment, interventions and recommendations.  Outcome: Progressing  Note: Prognosis: Fair  Problem List: Decreased strength, Decreased range of motion, Decreased endurance, Impaired balance, Decreased mobility, Decreased cognition, Decreased coordination, Impaired judgement, Decreased safety awareness, Decreased skin integrity, Pain  Assessment: Pt appears more awake and alert compared to previous session. PT session focused on sitting tolerance and LE exercises. Pt did tolerate treatment well today, but was limited due to vitals instability (HR increased to 150s). Pt was able to perform a supine to sit dependent x 2 and HR increased to 150s. Pt was dependent x 2 to put supine again and vitals improved after a few minutes. Bed was placed into a chair position and pt performed LE exercises. Pt was able to open eyes more frequently this session and shaked head a few times. Pt would continue to benefit from skilled PT. Pt will continue to be seen upon admission. Pt's prognosis is Fair secondary to age, PLOF, medical complications, and PMH. The patient's AM-PAC Basic Mobility Inpatient Standardized Score is less than 42.9, suggesting this patient may benefit from discharge to post-acute rehabilitation services. Please also refer to the recommendation of the Physical Therapist for safe discharge planning.  Barriers to Discharge: None     Rehab Resource Intensity Level, PT: I (Maximum Resource Intensity)    See flowsheet documentation for full assessment.

## 2024-03-15 NOTE — PROGRESS NOTES
Progress Note - Infectious Disease   Carla Hunt 59 y.o. female MRN: [id number]  [facility]: [id number]      Impression/Plan:    1. Acute hypoxic respiratory failure, likely multifactorial, with both COVID and bacterial pneumonia contributing.  Also consider persistent inflammation, fibrosis as seems quite steroid responsive in past.  Most recently extubated 3/1.  Patient with worsening respiratory status requiring intubation again 3/11. Consider progressive bacterial infection given increased secretions, although has been on antibiotics and has no fever, leukocytosis, or focal infiltrates on recent CT.  Consider recrudescence of inflammation in setting of steroid taper as this has been observed previously.  CRP decreased with increase in steroids, no change in antibiotics.  Still has low-level COVID shedding although suspect this is not primarily driving worsening hypoxia.  Status post bronchoscopy and trach 3/12 with excessive secretions seen from the lower lobes bilaterally.  BAL culture from 3/11 shows heavy growth of Stenotrophomonas maltophilia.  PJP DFA negative. While the patient has grown this before and she certainly may be colonized, given worsening CT findings, intubation and prolonged steroids would treat as a true pathogen.  Status post 10 days of vancomycin and cefepime.  Started on Bactrim 3/13. CRP improving, patient is on PS and now waking up/participating with PT  -Continue antivirals through today  -continue Bactrim 2 DS tabs twice daily crushed via NG tube. If creatinine increases to 1.9, will change to 1 DS BID or minocycline  -Steroid dosing per critical care, tolerating weaning with improving CRP  -No indication to treat Candida in respiratory culture, this is respiratory colonization  -Trend CRP  -If clinically deteriorates with concern for progressive sepsis would add meropenem 1g IV Q8 to the Bactrim     2. SIRS versus sepsis.  Fluctuating fever, WBC.  Fevers may  be multifactorial due to COVID (still positive antigen and PCR) versus inflammation (seem to correlate to steroid dosing).  Also consider developing bacterial infection.  Recent blood cultures 2/26 negative.  CT C/A/P 3/10 shows stable groundglass and nodular opacities, inflammation around stoma. Now with dehiscence of her abdominal wound following staple removal, status post wound VAC placement 3/13. Patient afebrile, without leukocytosis  -Continue current antivirals  -antibiotics as above  -Follow temperatures closely  -Recheck WBC in AM to monitor infection  -Supportive care as per the primary service     3. Recurrent bacterial pneumonia.  The patient has completed multiple treatment courses over the hospitalization.  Most recent BAL culture with Pseudomonas and stenotrophomonas.  Unclear if pathogens or colonizers.  Status post 10 days levofloxacin.  CT C/A/P showed evolving changes likely all due to sequelae of COVID, infections.  Noted to have worsening hypoxia and purulent secretions on bronchoscopy 3/11.  BAL culture with heavy growth of Stenotrophomonas maltophilia              -Antibiotics as above              -Wean O2 as able     4. Severe COVID, present on admission.  Patient was treated with a 10-day course of remdesivir, dexamethasone and was given 1 dose of Tocilizumab on admission.  COVID antigen was negative prior to coming off isolation.  Had positive COVID PCR from BAL 2/16, status post another 5-day course of remdesivir.  Patient has remained on high-dose systemic corticosteroid throughout her hospitalization which were recently intensified 2/26 for fevers.  Patient having persistent fevers, hypoxia.  COVID antigen positive and PCR is also positive 3/3 and Ct 26.8, consistent with high viral replication.  Repeat PCR positive with Ct now closer to 30. CRP decreasing with slow steroid wean  -Continue remdesivir/Paxlovid to complete a 14-day total course through 3/15/2024  -Steroid wean per ICU      5. 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  Patient is status post exploratory laparotomy with ileocecectomy and end ileostomy creation .   End ileostomy is with ongoing ischemic changes which is likely contributing to the imaging findings and ongoing SIRS response.  Now with wound dehiscence status post staple removal, status post wound VAC placement 3/13  -Serial exams  -Surgery follow-up   -no current signs of infection, need for antibiotics      B-cell lymphoma, on maintenance rituxan, which is currently postponed.  Rituximab is a risk factor for persistent COVID infection.    7.  ELIESER.  More likely due to hypovolemia since creatinine was increasing prior to starting Bactrim.   -Monitor creatinine closely   -Dose adjust antibiotics as needed    Above management plan to continue Bactrim with the critical care resident who is in agreement. Discussed with the  at bedside. ID will follow.    Antibiotics:  Day 14 Paxlovid/remdesivir  Day 3 Bactrim    Subjective:  The patient remains on the ventilator, tolerating PS wean during day, somewhat following commands. Working with PT. No fever, leukocytosis.    Objective:  Vitals:  Temp:  [98.4 °F (36.9 °C)] 98.4 °F (36.9 °C)  HR:  [] 107  Resp:  [12-40] 31  BP: (117)/(52) 117/52  SpO2:  [92 %-98 %] 94 %  Temp (24hrs), Av.4 °F (36.9 °C), Min:98.4 °F (36.9 °C), Max:98.4 °F (36.9 °C)  Current: Temperature: 98.4 °F (36.9 °C)    Physical Exam:   General Appearance:  Ill-appearing, lethargic but now awakens to voice   Neck: Trach in place   Lungs:   Rhonchi bilaterally   Heart:  RRR; no murmur, rub or gallop   Abdomen:   Midline wound dehiscence with wound VAC in place, no surrounding erythema   Extremities: No edema   Skin: No new rashes or lesions.        Labs:   All pertinent labs and imaging studies were personally reviewed  Results from last 7 days   Lab Units 03/15/24  0603 24  0542 24  0449   WBC Thousand/uL 7.59 10.46*  8.87   HEMOGLOBIN g/dL 7.1* 7.4* 7.4*   PLATELETS Thousands/uL 118* 191 163     Results from last 7 days   Lab Units 03/15/24  0603 03/14/24  0542 03/13/24  0449 03/10/24  1806 03/10/24  0513   SODIUM mmol/L 138 145 144   < > 148*   POTASSIUM mmol/L 3.3* 3.2* 4.0   < > 4.0   CHLORIDE mmol/L 109* 112* 114*   < > 112*   CO2 mmol/L 18* 19* 19*   < > 24   BUN mg/dL 61* 59* 53*   < > 38*   CREATININE mg/dL 1.59* 1.55* 1.28   < > 0.59*   EGFR ml/min/1.73sq m 35 36 46   < > 101   CALCIUM mg/dL 7.6* 7.7* 7.7*   < > 8.7   AST U/L  --   --   --   --  18   ALT U/L  --   --   --   --  31   ALK PHOS U/L  --   --   --   --  90    < > = values in this interval not displayed.     Results from last 7 days   Lab Units 03/10/24  0513   PROCALCITONIN ng/ml 1.08*     Results from last 7 days   Lab Units 03/15/24  0603 03/14/24  0542 03/13/24  0449 03/12/24  0401 03/11/24  0415 03/10/24  0513 03/09/24  0554   CRP mg/L 18.6* 32.3* 73.6* 106.3* 166.1* 112.1* 14.8*           Results from last 7 days   Lab Units 03/12/24  0446   D-DIMER QUANTITATIVE ug/ml FEU 0.54*         Imaging:  CT chest, abdomen, pelvis personally reviewed and shows persistent bilateral groundglass nodular consolidation

## 2024-03-15 NOTE — OCCUPATIONAL THERAPY NOTE
Occupational Therapy Progress Note     Patient Name: Carla Hunt  Today's Date: 3/15/2024  Problem List  Principal Problem:    Acute respiratory failure with hypoxia (HCC)  Active Problems:    Anxiety state    Encephalopathy    COVID    Stage 3b chronic kidney disease (CKD) (HCC)    Teto marginal zone B-cell lymphoma (HCC)    Seizure disorder (HCC)    Hypotension    Palliative care patient    Goals of care, counseling/discussion    Acute kidney injury (HCC)    Cecal volvulus (HCC)          03/15/24 1112   OT Last Visit   OT Visit Date 03/15/24   Pain Assessment   Pain Assessment Tool FLACC   Pain Rating: FLACC (Rest) - Face 0   Pain Rating: FLACC (Rest) - Legs 0   Pain Rating: FLACC (Rest) - Activity 0   Pain Rating: FLACC (Rest) - Cry 0   Pain Rating: FLACC (Rest) - Consolability 0   Score: FLACC (Rest) 0   Pain Rating: FLACC (Activity) - Face 1   Pain Rating: FLACC (Activity) - Legs 0   Pain Rating: FLACC (Activity) - Activity 0   Pain Rating: FLACC (Activity) - Cry 0   Pain Rating: FLACC (Activity) - Consolability 1   Score: FLACC (Activity) 2   Restrictions/Precautions   Weight Bearing Precautions Per Order No   Braces or Orthoses   (B/L resting hand splints)   Other Precautions Cognitive;Multiple lines;Telemetry;Fall Risk  (NG tube, trach)   Lifestyle   Autonomy I adls and mobility - i iadls - shares homemaking with family   Reciprocal Relationships supportive family   Service to Others volunteers   Intrinsic Gratification will continue to assess   Bed Mobility   Supine to Sit 1  Dependent   Cognition   Overall Cognitive Status Impaired   Arousal/Participation Sedated   Attention WILL   Orientation Level Unable to assess   Memory Unable to assess   Following Commands Unable to follow one step commands   Assessment   Assessment Pt was seen for a skilled OT treatment session on 3/15/24, focusing on sitting tolerance and therapeutic activities to increase strength/ROM of B/L UEs for engagement in  functional seated tasks. Pt was greeted supine in bed. When attempted to sit EOB, pt HR increased to 150s, was returned supine in bed. Once HR returned WNL, pt was placed in chair position in bed. Pt tolerated position well, HR remaining WNL. Pt participated in B/L UE/LE PROM therapeutic exercise to maintain PROM, prevent contractures, and strengthen B/L UEs. Pt was observed slightly moving RUE against gravity in response to discomfort, but when prompted to move B/L UEs, pt was unable. Pt remained in chair position at end of session. OT will continue to monitor/treat.   Plan   Treatment Interventions UE strengthening/ROM;Endurance training;Continued evaluation;Activityengagement   Goal Expiration Date 03/19/24   OT Treatment Day 10   OT Frequency 2-3x/wk   Discharge Recommendation   Rehab Resource Intensity Level, OT II (Moderate Resource Intensity)   AM-PAC Daily Activity Inpatient   Lower Body Dressing 1   Bathing 1   Toileting 1   Upper Body Dressing 1   Grooming 1   Eating 1   Daily Activity Raw Score 6   AM-PAC Applied Cognition Inpatient   Following a Speech/Presentation 1   Understanding Ordinary Conversation 1   Taking Medications 1   Remembering Where Things Are Placed or Put Away 1   Remembering List of 4-5 Errands 1   Taking Care of Complicated Tasks 1   Applied Cognition Raw Score 6   Applied Cognition Standardized Score 7.69   End of Consult   Patient Position at End of Consult Supine;All needs within reach  (chair position)   Nurse Communication Nurse aware of consult     Ian Epps

## 2024-03-15 NOTE — UTILIZATION REVIEW
"Continued Stay Review    Date: 03/15                          Current Patient Class: IP  Current Level of Care: Level 2 stepdown/HOT    HPI:59 y.o. female initially admitted on 01/10     Assessment/Plan: Pt's MS improving but still not fully following commands, remains off sedation, off pressors, tolerated PSV all day, ABG improving. Cont on PSV. Cont to mon MAPS goal >65. Cont Bactrim. Taper IV steroids by 10mg every 3d until patient is down to 10mg q6h and then start decreasing frequency till patient is off. Cont TF. Mon wound VAc- Gen surg ff. switch lovenox to heparin d/t multifactorial anemia. Cont Insulin gtt. Wound care.     Vital Signs: /52   Pulse (!) 114   Temp 98.4 °F (36.9 °C) (Oral)   Resp 22   Ht 5' 8\" (1.727 m)   Wt 74.1 kg (163 lb 5.8 oz)   SpO2 92%   BMI 24.84 kg/m²       Pertinent Labs/Diagnostic Results:   Results from last 7 days   Lab Units 03/11/24  0417   SARS-COV-2  Positive*     Results from last 7 days   Lab Units 03/15/24  0603 03/14/24  0542 03/13/24  0449 03/13/24  0210 03/12/24  0758 03/10/24  0513 03/09/24  0554   WBC Thousand/uL 7.59 10.46* 8.87 8.01 9.71   < > 21.99*  21.99*   HEMOGLOBIN g/dL 7.1* 7.4* 7.4* 7.7* 7.7*   < > 8.7*  8.7*   HEMATOCRIT % 21.2* 22.5* 22.1* 23.1* 23.6*   < > 27.8*  27.8*   PLATELETS Thousands/uL 118* 191 163 159 179   < > 419*  419*   BANDS PCT %  --   --   --   --  14*  --  2    < > = values in this interval not displayed.     Results from last 7 days   Lab Units 03/12/24  0401   RETIC CT ABS  94,200   RETIC CT PCT % 5.04*     Results from last 7 days   Lab Units 03/15/24  0603 03/14/24  0542 03/13/24  0449 03/12/24  1233 03/12/24  0401 03/11/24  0415   SODIUM mmol/L 138 145 144 143 145 147   POTASSIUM mmol/L 3.3* 3.2* 4.0 5.0 3.0* 4.1   CHLORIDE mmol/L 109* 112* 114* 113* 111* 115*   CO2 mmol/L 18* 19* 19* 23 23 21   ANION GAP mmol/L 11 14* 11 7 11 11   BUN mg/dL 61* 59* 53* 44* 41* 39*   CREATININE mg/dL 1.59* 1.55* 1.28 0.96 0.90 0.69 " "  EGFR ml/min/1.73sq m 35 36 46 65 70 96   CALCIUM mg/dL 7.6* 7.7* 7.7* 7.5* 7.9* 8.3*   CALCIUM, IONIZED mmol/L  --  1.12 1.12  --   --   --    MAGNESIUM mg/dL 1.9 2.0 2.0  --  2.1 2.2   PHOSPHORUS mg/dL 3.8 4.0  4.0 3.9  --   --   --      Results from last 7 days   Lab Units 03/10/24  0513   AST U/L 18   ALT U/L 31   ALK PHOS U/L 90   TOTAL PROTEIN g/dL 5.5*   ALBUMIN g/dL 2.9*   TOTAL BILIRUBIN mg/dL 0.59   BILIRUBIN DIRECT mg/dL 0.22*     Results from last 7 days   Lab Units 03/15/24  1604 03/15/24  1435 03/15/24  1215 03/15/24  1006 03/15/24  0826 03/15/24  0602 03/15/24  0412 03/15/24  0210 03/15/24  0128 03/15/24  0032 03/14/24  2351 03/14/24  2221   POC GLUCOSE mg/dl 157* 219* 134 128 119 147* 213* 187* 190* 179* 151* 153*     Results from last 7 days   Lab Units 03/15/24  0603 03/14/24  0542 03/13/24  0449 03/12/24  1233 03/12/24  0401 03/11/24  0415 03/11/24  0028 03/10/24  1806 03/10/24  0513 03/09/24  2225 03/09/24  1358 03/09/24  0554   GLUCOSE RANDOM mg/dL 133 141* 123 106 128 238* 116 215* 176* 187* 143* 151*             No results found for: \"BETA-HYDROXYBUTYRATE\"   Results from last 7 days   Lab Units 03/14/24  1840 03/14/24  1124 03/14/24  0104   PH ART  7.330* 7.346* 7.374   PCO2 ART mm Hg 36.3 34.8* 34.3*   PO2 ART mm Hg 90.7 73.4* 89.9   HCO3 ART mmol/L 18.7* 18.6* 19.6*   BASE EXC ART mmol/L -6.6 -6.4 -5.1   O2 CONTENT ART mL/dL 11.2* 10.9* 10.7*   O2 HGB, ARTERIAL % 95.6 93.6* 96.2   ABG SOURCE  Line, Arterial Line, Arterial Line, Arterial     Results from last 7 days   Lab Units 03/10/24  0534   PH NICA  7.391   PCO2 NICA mm Hg 39.2*   PO2 NICA mm Hg 40.3   HCO3 NICA mmol/L 23.3*   BASE EXC NICA mmol/L -1.5   O2 CONTENT NICA ml/dL 9.9   O2 HGB, VENOUS % 73.3             Results from last 7 days   Lab Units 03/09/24  1358   HS TNI 0HR ng/L 60*     Results from last 7 days   Lab Units 03/12/24  0446   D-DIMER QUANTITATIVE ug/ml FEU 0.54*     Results from last 7 days   Lab Units 03/12/24  0446 " 03/12/24  0431   PROTIME seconds  --  16.8*   INR   --  1.39*   PTT seconds 33  --          Results from last 7 days   Lab Units 03/10/24  0513   PROCALCITONIN ng/ml 1.08*     Results from last 7 days   Lab Units 03/10/24  1618 03/10/24  0851 03/10/24  0513   LACTIC ACID mmol/L 1.4 3.3* 3.5*                         Results from last 7 days   Lab Units 03/13/24  0555   UNIT PRODUCT CODE  [id number]  [id number]   UNIT NUMBER  [id number]  [id number]   UNITABO  O  A   UNITRH  NEG  NEG   CROSSMATCH  Compatible  Compatible   UNIT DISPENSE STATUS  Presumed Trans  Presumed Trans   UNIT PRODUCT VOL mL 350  350             Results from last 7 days   Lab Units 03/15/24  0603 03/14/24  0542 03/13/24  0449 03/12/24  0401 03/11/24  0415   CRP mg/L 18.6* 32.3* 73.6* 106.3* 166.1*                                             Results from last 7 days   Lab Units 03/11/24  1306   GRAM STAIN RESULT  3+ Polys*  3+ Gram variable rods*  1+ Budding Yeast with Pseudomycelia*             Results from last 7 days   Lab Units 03/14/24  0542 03/13/24  0449 03/12/24  0441 03/11/24  0557   VANCOMYCIN RM ug/mL 23.7* 27.9* 31.0* 26.7*       Medications:   Scheduled Medications:  chlorhexidine, 15 mL, Mouth/Throat, Q12H SARTHAK  heparin (porcine), 5,000 Units, Subcutaneous, Q8H SARTHAK  ipratropium, 0.5 mg, Nebulization, TID  lamoTRIgine, 150 mg, Oral, BID  levalbuterol, 1.25 mg, Nebulization, TID  methylPREDNISolone sodium succinate, 50 mg, Intravenous, Q6H SARTHAK   Followed by  [START ON 3/17/2024] methylPREDNISolone sodium succinate, 40 mg, Intravenous, Q6H SARTHAK   Followed by  [START ON 3/20/2024] methylPREDNISolone sodium succinate, 30 mg, Intravenous, Q6H SARTHAK   Followed by  [START ON 3/23/2024] methylPREDNISolone sodium succinate, 20 mg, Intravenous, Q6H SARTHAK   Followed by  [START ON 3/26/2024] methylPREDNISolone sodium succinate, 10 mg, Intravenous, Q6H SARTHAK   Followed by  [START ON 3/29/2024] methylPREDNISolone sodium succinate, 10 mg,  Intravenous, Q8H SARTHAK   Followed by  [START ON 4/1/2024] methylPREDNISolone sodium succinate, 10 mg, Intravenous, Q12H SARTHAK   Followed by  [START ON 4/8/2024] methylPREDNISolone sodium succinate, 10 mg, Intravenous, Daily  nirmatrelvir & ritonavir, 3 tablet, Oral, BID  nystatin, , Topical, BID  polyethylene glycol, 17 g, Per NG Tube, Daily  propofol, 100 mg, Intravenous, Once  sodium chloride, 2 spray, Each Nare, Q4H  sulfamethoxazole-trimethoprim, 2 tablet, Per NG Tube, Q12H SARTHAK  topiramate, 50 mg, Per PEG Tube, BID  venlafaxine, 25 mg, Oral, Daily      Continuous IV Infusions:  fentaNYL, 50 mcg/hr, Intravenous, Continuous  insulin regular (HumuLIN R,NovoLIN R) 1 Units/mL in sodium chloride 0.9 % 100 mL infusion, 0.3-21 Units/hr, Intravenous, Titrated  norepinephrine, 1-30 mcg/min, Intravenous, Titrated  propofol, 5-50 mcg/kg/min, Intravenous, Titrated      PRN Meds:  acetaminophen, 650 mg, Oral, Q6H PRN  fentaNYL, 50 mcg, Intravenous, Q1H PRN 03/15 x 1  OLANZapine, 10 mg, Oral, HS PRN  ondansetron, 4 mg, Intravenous, Q6H PRN  oxyCODONE, 2.5 mg, Oral, Q4H PRN   Or  oxyCODONE, 5 mg, Oral, Q4H PRN 03/15 x 2  sodium chloride, 1 Application, Nasal, Q1H PRN        Discharge Plan: D    Network Utilization Review Department  ATTENTION: Please call with any questions or concerns to [phone] and carefully listen to the prompts so that you are directed to the right person. All voicemails are confidential.   For Discharge needs, contact Care Management DC Support Team at [phone] opt. 2  Send all requests for admission clinical reviews, approved or denied determinations and any other requests to dedicated fax number below belonging to the campus where the patient is receiving treatment. List of dedicated fax numbers for the Facilities:  FACILITY NAME UR FAX NUMBER   ADMISSION DENIALS (Administrative/Medical Necessity) [phone]   DISCHARGE SUPPORT TEAM (NETWORK) [phone]   [facility]  (Maternity/NICU/Pediatrics) [phone]   [facility] [phone]   [facility] [phone]   [facility] [phone]   [facility] [phone]   [facility] [phone]   [facility] [phone]   [facility] [phone]   [facility] [phone]   [facility] [phone]   [facility] [phone]   [facility] [phone]   [facility] [phone]

## 2024-03-16 LAB
ANION GAP SERPL CALCULATED.3IONS-SCNC: 12 MMOL/L (ref 4–13)
BUN SERPL-MCNC: 65 MG/DL (ref 5–25)
CALCIUM SERPL-MCNC: 8.2 MG/DL (ref 8.4–10.2)
CHLORIDE SERPL-SCNC: 107 MMOL/L (ref 96–108)
CO2 SERPL-SCNC: 18 MMOL/L (ref 21–32)
CREAT SERPL-MCNC: 1.65 MG/DL (ref 0.6–1.3)
CRP SERPL QL: 13.4 MG/L
ERYTHROCYTE [DISTWIDTH] IN BLOOD BY AUTOMATED COUNT: 20.9 % (ref 11.6–15.1)
GFR SERPL CREATININE-BSD FRML MDRD: 33 ML/MIN/1.73SQ M
GLUCOSE SERPL-MCNC: 106 MG/DL (ref 65–140)
GLUCOSE SERPL-MCNC: 110 MG/DL (ref 65–140)
GLUCOSE SERPL-MCNC: 113 MG/DL (ref 65–140)
GLUCOSE SERPL-MCNC: 139 MG/DL (ref 65–140)
GLUCOSE SERPL-MCNC: 148 MG/DL (ref 65–140)
GLUCOSE SERPL-MCNC: 151 MG/DL (ref 65–140)
GLUCOSE SERPL-MCNC: 165 MG/DL (ref 65–140)
GLUCOSE SERPL-MCNC: 169 MG/DL (ref 65–140)
GLUCOSE SERPL-MCNC: 174 MG/DL (ref 65–140)
GLUCOSE SERPL-MCNC: 181 MG/DL (ref 65–140)
GLUCOSE SERPL-MCNC: 188 MG/DL (ref 65–140)
GLUCOSE SERPL-MCNC: 209 MG/DL (ref 65–140)
GLUCOSE SERPL-MCNC: 263 MG/DL (ref 65–140)
HCT VFR BLD AUTO: 22.2 % (ref 34.8–46.1)
HGB BLD-MCNC: 7.4 G/DL (ref 11.5–15.4)
MAGNESIUM SERPL-MCNC: 2.1 MG/DL (ref 1.9–2.7)
MCH RBC QN AUTO: 30.5 PG (ref 26.8–34.3)
MCHC RBC AUTO-ENTMCNC: 33.3 G/DL (ref 31.4–37.4)
MCV RBC AUTO: 91 FL (ref 82–98)
PHOSPHATE SERPL-MCNC: 3.6 MG/DL (ref 2.7–4.5)
PLATELET # BLD AUTO: 139 THOUSANDS/UL (ref 149–390)
PMV BLD AUTO: 12.8 FL (ref 8.9–12.7)
POTASSIUM SERPL-SCNC: 3.9 MMOL/L (ref 3.5–5.3)
RBC # BLD AUTO: 2.43 MILLION/UL (ref 3.81–5.12)
SODIUM SERPL-SCNC: 137 MMOL/L (ref 135–147)
WBC # BLD AUTO: 9.47 THOUSAND/UL (ref 4.31–10.16)

## 2024-03-16 PROCEDURE — 83735 ASSAY OF MAGNESIUM: CPT | Performed by: STUDENT IN AN ORGANIZED HEALTH CARE EDUCATION/TRAINING PROGRAM

## 2024-03-16 PROCEDURE — 86140 C-REACTIVE PROTEIN: CPT | Performed by: STUDENT IN AN ORGANIZED HEALTH CARE EDUCATION/TRAINING PROGRAM

## 2024-03-16 PROCEDURE — 82948 REAGENT STRIP/BLOOD GLUCOSE: CPT

## 2024-03-16 PROCEDURE — 84100 ASSAY OF PHOSPHORUS: CPT

## 2024-03-16 PROCEDURE — 94760 N-INVAS EAR/PLS OXIMETRY 1: CPT

## 2024-03-16 PROCEDURE — 94003 VENT MGMT INPAT SUBQ DAY: CPT

## 2024-03-16 PROCEDURE — 94664 DEMO&/EVAL PT USE INHALER: CPT

## 2024-03-16 PROCEDURE — 99291 CRITICAL CARE FIRST HOUR: CPT | Performed by: INTERNAL MEDICINE

## 2024-03-16 PROCEDURE — 94640 AIRWAY INHALATION TREATMENT: CPT

## 2024-03-16 PROCEDURE — 80048 BASIC METABOLIC PNL TOTAL CA: CPT | Performed by: STUDENT IN AN ORGANIZED HEALTH CARE EDUCATION/TRAINING PROGRAM

## 2024-03-16 PROCEDURE — 85027 COMPLETE CBC AUTOMATED: CPT | Performed by: STUDENT IN AN ORGANIZED HEALTH CARE EDUCATION/TRAINING PROGRAM

## 2024-03-16 RX ORDER — SODIUM CHLORIDE FOR INHALATION 3 %
4 VIAL, NEBULIZER (ML) INHALATION
Status: DISCONTINUED | OUTPATIENT
Start: 2024-03-16 | End: 2024-04-16

## 2024-03-16 RX ORDER — LORAZEPAM 2 MG/ML
0.2 INJECTION INTRAMUSCULAR EVERY 6 HOURS PRN
Status: DISCONTINUED | OUTPATIENT
Start: 2024-03-16 | End: 2024-03-16

## 2024-03-16 RX ORDER — LORAZEPAM 2 MG/ML
0.5 INJECTION INTRAMUSCULAR EVERY 6 HOURS PRN
Status: DISCONTINUED | OUTPATIENT
Start: 2024-03-16 | End: 2024-03-19

## 2024-03-16 RX ORDER — VENLAFAXINE 37.5 MG/1
37.5 TABLET ORAL DAILY
Status: DISCONTINUED | OUTPATIENT
Start: 2024-03-17 | End: 2024-03-18

## 2024-03-16 RX ORDER — HYDROXYZINE HYDROCHLORIDE 25 MG/1
25 TABLET, FILM COATED ORAL EVERY 6 HOURS PRN
Status: DISCONTINUED | OUTPATIENT
Start: 2024-03-16 | End: 2024-03-16

## 2024-03-16 RX ORDER — VENLAFAXINE 25 MG/1
12.5 TABLET ORAL ONCE
Qty: 1 TABLET | Refills: 0 | Status: COMPLETED | OUTPATIENT
Start: 2024-03-16 | End: 2024-03-16

## 2024-03-16 RX ORDER — SODIUM CHLORIDE FOR INHALATION 3 %
4 VIAL, NEBULIZER (ML) INHALATION EVERY 8 HOURS
Status: DISCONTINUED | OUTPATIENT
Start: 2024-03-16 | End: 2024-03-16

## 2024-03-16 RX ADMIN — LAMOTRIGINE 150 MG: 100 TABLET ORAL at 10:00

## 2024-03-16 RX ADMIN — LEVALBUTEROL HYDROCHLORIDE 1.25 MG: 1.25 SOLUTION RESPIRATORY (INHALATION) at 07:28

## 2024-03-16 RX ADMIN — Medication 2 SPRAY: at 10:28

## 2024-03-16 RX ADMIN — METHYLPREDNISOLONE SODIUM SUCCINATE 50 MG: 125 INJECTION, POWDER, FOR SOLUTION INTRAMUSCULAR; INTRAVENOUS at 05:27

## 2024-03-16 RX ADMIN — TOPIRAMATE 50 MG: 100 TABLET, FILM COATED ORAL at 10:00

## 2024-03-16 RX ADMIN — CHLORHEXIDINE GLUCONATE 0.12% ORAL RINSE 15 ML: 1.2 LIQUID ORAL at 21:26

## 2024-03-16 RX ADMIN — CHLORHEXIDINE GLUCONATE 0.12% ORAL RINSE 15 ML: 1.2 LIQUID ORAL at 10:00

## 2024-03-16 RX ADMIN — LORAZEPAM 0.5 MG: 2 INJECTION INTRAMUSCULAR; INTRAVENOUS at 21:28

## 2024-03-16 RX ADMIN — IPRATROPIUM BROMIDE 0.5 MG: 0.5 SOLUTION RESPIRATORY (INHALATION) at 07:28

## 2024-03-16 RX ADMIN — Medication 2 SPRAY: at 05:27

## 2024-03-16 RX ADMIN — SULFAMETHOXAZOLE AND TRIMETHOPRIM 2 TABLET: 800; 160 TABLET ORAL at 21:32

## 2024-03-16 RX ADMIN — LEVALBUTEROL HYDROCHLORIDE 1.25 MG: 1.25 SOLUTION RESPIRATORY (INHALATION) at 19:34

## 2024-03-16 RX ADMIN — SODIUM CHLORIDE SOLN NEBU 3% 4 ML: 3 NEBU SOLN at 19:34

## 2024-03-16 RX ADMIN — LAMOTRIGINE 150 MG: 100 TABLET ORAL at 18:30

## 2024-03-16 RX ADMIN — POLYETHYLENE GLYCOL 3350 17 G: 17 POWDER, FOR SOLUTION ORAL at 10:00

## 2024-03-16 RX ADMIN — METHYLPREDNISOLONE SODIUM SUCCINATE 50 MG: 125 INJECTION, POWDER, FOR SOLUTION INTRAMUSCULAR; INTRAVENOUS at 00:28

## 2024-03-16 RX ADMIN — NYSTATIN: 100000 POWDER TOPICAL at 18:31

## 2024-03-16 RX ADMIN — FENTANYL CITRATE 50 MCG: 50 INJECTION INTRAMUSCULAR; INTRAVENOUS at 11:08

## 2024-03-16 RX ADMIN — SULFAMETHOXAZOLE AND TRIMETHOPRIM 2 TABLET: 800; 160 TABLET ORAL at 10:00

## 2024-03-16 RX ADMIN — Medication 2 SPRAY: at 18:31

## 2024-03-16 RX ADMIN — Medication 2 SPRAY: at 14:17

## 2024-03-16 RX ADMIN — VENLAFAXINE 12.5 MG: 25 TABLET ORAL at 12:16

## 2024-03-16 RX ADMIN — VENLAFAXINE 25 MG: 25 TABLET ORAL at 10:00

## 2024-03-16 RX ADMIN — Medication 2 SPRAY: at 21:34

## 2024-03-16 RX ADMIN — HEPARIN SODIUM 5000 UNITS: 5000 INJECTION INTRAVENOUS; SUBCUTANEOUS at 21:26

## 2024-03-16 RX ADMIN — METHYLPREDNISOLONE SODIUM SUCCINATE 50 MG: 125 INJECTION, POWDER, FOR SOLUTION INTRAMUSCULAR; INTRAVENOUS at 18:30

## 2024-03-16 RX ADMIN — IPRATROPIUM BROMIDE 0.5 MG: 0.5 SOLUTION RESPIRATORY (INHALATION) at 14:08

## 2024-03-16 RX ADMIN — IPRATROPIUM BROMIDE 0.5 MG: 0.5 SOLUTION RESPIRATORY (INHALATION) at 19:34

## 2024-03-16 RX ADMIN — LEVALBUTEROL HYDROCHLORIDE 1.25 MG: 1.25 SOLUTION RESPIRATORY (INHALATION) at 14:08

## 2024-03-16 RX ADMIN — SODIUM CHLORIDE 12 UNITS/HR: 9 INJECTION, SOLUTION INTRAVENOUS at 14:22

## 2024-03-16 RX ADMIN — TOPIRAMATE 50 MG: 100 TABLET, FILM COATED ORAL at 18:30

## 2024-03-16 RX ADMIN — FENTANYL CITRATE 50 MCG: 50 INJECTION INTRAMUSCULAR; INTRAVENOUS at 05:33

## 2024-03-16 RX ADMIN — HEPARIN SODIUM 5000 UNITS: 5000 INJECTION INTRAVENOUS; SUBCUTANEOUS at 14:17

## 2024-03-16 RX ADMIN — FENTANYL CITRATE 50 MCG: 50 INJECTION INTRAMUSCULAR; INTRAVENOUS at 00:41

## 2024-03-16 RX ADMIN — NYSTATIN: 100000 POWDER TOPICAL at 10:00

## 2024-03-16 RX ADMIN — METHYLPREDNISOLONE SODIUM SUCCINATE 50 MG: 125 INJECTION, POWDER, FOR SOLUTION INTRAMUSCULAR; INTRAVENOUS at 12:16

## 2024-03-16 RX ADMIN — HEPARIN SODIUM 5000 UNITS: 5000 INJECTION INTRAVENOUS; SUBCUTANEOUS at 05:27

## 2024-03-16 NOTE — PLAN OF CARE
Problem: INFECTION - ADULT  Goal: Absence or prevention of progression during hospitalization  Description: INTERVENTIONS:  - Assess and monitor for signs and symptoms of infection  - Monitor lab/diagnostic results  - Monitor all insertion sites, i.e. indwelling lines, tubes, and drains  - Monitor endotracheal if appropriate and nasal secretions for changes in amount and color  - [place] appropriate cooling/warming therapies per order  - Administer medications as ordered  - Instruct and encourage patient and family to use good hand hygiene technique  - Identify and instruct in appropriate isolation precautions for identified infection/condition  Outcome: Progressing     Problem: NEUROSENSORY - ADULT  Goal: Achieves stable or improved neurological status  Description: INTERVENTIONS  - Monitor and report changes in neurological status  - Monitor vital signs such as temperature, blood pressure, glucose, and any other labs ordered   - Initiate measures to prevent increased intracranial pressure  - Monitor for seizure activity and implement precautions if appropriate      Outcome: Progressing  Goal: Achieves maximal functionality and self care  Description: INTERVENTIONS  - Monitor swallowing and airway patency with patient fatigue and changes in neurological status  - Encourage and assist patient to increase activity and self care.   - Encourage visually impaired, hearing impaired and aphasic patients to use assistive/communication devices  Outcome: Progressing     Problem: GASTROINTESTINAL - ADULT  Goal: Minimal or absence of nausea and/or vomiting  Description: INTERVENTIONS:  - Administer IV fluids if ordered to ensure adequate hydration  - Maintain NPO status until nausea and vomiting are resolved  - Nasogastric tube if ordered  - Administer ordered antiemetic medications as needed  - Provide nonpharmacologic comfort measures as appropriate  - Advance diet as tolerated, if ordered  - Consider nutrition services  referral to assist patient with adequate nutrition and appropriate food choices  Outcome: Progressing  Goal: Maintains or returns to baseline bowel function  Description: INTERVENTIONS:  - Assess bowel function  - Encourage oral fluids to ensure adequate hydration  - Administer IV fluids if ordered to ensure adequate hydration  - Administer ordered medications as needed  - Encourage mobilization and activity  - Consider nutritional services referral to assist patient with adequate nutrition and appropriate food choices  Outcome: Progressing  Goal: Maintains adequate nutritional intake  Description: INTERVENTIONS:  - Monitor percentage of each meal consumed  - Identify factors contributing to decreased intake, treat as appropriate  - Assist with meals as needed  - Monitor I&O, weight, and lab values if indicated  - Obtain nutrition services referral as needed  Outcome: Progressing  Goal: Establish and maintain optimal ostomy function  Description: INTERVENTIONS:  - Assess bowel function  - Encourage oral fluids to ensure adequate hydration  - Administer IV fluids if ordered to ensure adequate hydration   - Administer ordered medications as needed  - Encourage mobilization and activity  - Nutrition services referral to assist patient with appropriate food choices  - Assess stoma site  - Consider wound care consult   Outcome: Progressing  Goal: Oral mucous membranes remain intact  Description: INTERVENTIONS  - Assess oral mucosa and hygiene practices  - Implement preventative oral hygiene regimen  - Implement oral medicated treatments as ordered  - Initiate Nutrition services referral as needed  Outcome: Progressing

## 2024-03-16 NOTE — RESPIRATORY THERAPY NOTE
03/16/24 1937   Respiratory Assessment   Assessment Type Assess only   General Appearance Sleeping   Respiratory Pattern Assisted   Chest Assessment Chest expansion symmetrical   Bilateral Breath Sounds Coarse   Cough None   Suction Trach   Resp Comments Rec'd pt. on SPONT, tolerating well. Will continue to monitor.   O2 Device G5   Vent Information   Vent ID [id number]   Vent type Summers G5   Summers Vent Mode SPONT   $ Pulse Oximetry Spot Check Charge Completed   SpO2 93 %   SPONT Settings   FIO2 (%) 40 %   PEEP (cmH2O) 6 cmH2O   Pressure Support (cmH2O) 12 cmH20   Flow Trigger (LPM) 5 LPM   P-ramp (ms) 50 ms   ETS  (%) 25 %   Humidification Heater   Heater Temp 95 °F (35 °C)   SPONT Actuals   Resp Rate (BPM) 18 BPM   VT (mL) 719 mL   MV (Obs) 12.9   MAP (cmH2O) 8.8 cmH2O   Peak Pressure (cmH2O) 23 cmH2O   I:E Ratio (Obs) 1/4.0   RSBI (f/vt) 25 f/Vt   Heater Temperature (Obs) 95 °F (35 °C)   SPONT Alarms   High Peak Pressure (cmH20) 40 cmH2O   High Resp Rate (BPM) 45 BPM   High MV (L/min) 25 L/min   Low MV (L/min) 5 L/min   High Spont VTE (mL) 1000 mL   Low Spont VTE (mL) 250 mL   Apnea Time (S) 20 S   SPONT Apnea Settings   Resp Rate (BPM) 15 BPM   FIO2 (%) 40 %   %TI (%) 1.3 %   Apnea Time (S) 20 S   Maintenance   Alarm (pink) cable attached No   Resuscitation bag with peep valve at bedside Yes   Water bag changed No   Circuit changed No   Daily Screen   Patient safety screen outcome: Passed   IHI Ventilator Associated Pneumonia Bundle   Daily Assessment of Readiness to Extubate Yes   Head of Bed Elevated HOB 30   Surgical Airway Shiley Cuffed   Placement Date/Time: 03/12/24 1200   Tube Size: 8 mm  Type: Tracheostomy  Brand: Naveen  Style: Cuffed   Status Cuff Inflated;Secured   Site Assessment Clean;Dry;No bleeding;No drainage;Sutures present   Ties Assessment Clean;Dry;Intact   Surgical Airway Cuff Pressure (color) Green   Equipment at bedside BVM;Wall Suction setup     RT Ventilator Management Note  Carla  Marilee 59 y.o. female MRN: [id number]  Unit/Bed#: [facility] 12 Encounter: [id number]      Daily Screen         3/16/2024  0725 3/16/2024  1937          Patient safety screen outcome:: Passed Passed (P)                 Physical Exam:   Assessment Type: Assess only  General Appearance: Sleeping  Respiratory Pattern: Assisted  Chest Assessment: Chest expansion symmetrical  Bilateral Breath Sounds: Coarse  Cough: None  Suction: Trach  O2 Device: G5      Resp Comments: Rec'd pt. on SPONT, tolerating well. Will continue to monitor.

## 2024-03-16 NOTE — PLAN OF CARE
Problem: Prexisting or High Potential for Compromised Skin Integrity  Goal: Skin integrity is maintained or improved  Description: INTERVENTIONS:  - Identify patients at risk for skin breakdown  - Assess and monitor skin integrity  - Assess and monitor nutrition and hydration status  - Monitor labs   - Assess for incontinence   - Turn and reposition patient  - Assist with mobility/ambulation  - Relieve pressure over bony prominences  - Avoid friction and shearing  - Provide appropriate hygiene as needed including keeping skin clean and dry  - Evaluate need for skin moisturizer/barrier cream  - Collaborate with interdisciplinary team   - Patient/family teaching  - Consider wound care consult   Outcome: Progressing     Problem: INFECTION - ADULT  Goal: Absence or prevention of progression during hospitalization  Description: INTERVENTIONS:  - Assess and monitor for signs and symptoms of infection  - Monitor lab/diagnostic results  - Monitor all insertion sites, i.e. indwelling lines, tubes, and drains  - Monitor endotracheal if appropriate and nasal secretions for changes in amount and color  - [place] appropriate cooling/warming therapies per order  - Administer medications as ordered  - Instruct and encourage patient and family to use good hand hygiene technique  - Identify and instruct in appropriate isolation precautions for identified infection/condition  Outcome: Progressing     Problem: SAFETY ADULT  Goal: Patient will remain free of falls  Description: INTERVENTIONS:  - Educate patient/family on patient safety including physical limitations  - Instruct patient to call for assistance with activity   - Consult OT/PT to assist with strengthening/mobility   - Keep Call bell within reach  - Keep bed low and locked with side rails adjusted as appropriate  - Keep care items and personal belongings within reach  - Initiate and maintain comfort rounds  - Make Fall Risk Sign visible to staff  - Offer Toileting every  2 Hours, in advance of need  - Initiate/Maintain bed alarm  - Obtain necessary fall risk management equipment: non skid footwear  - Apply yellow socks and bracelet for high fall risk patients  - Consider moving patient to room near nurses station  Outcome: Progressing     Problem: RESPIRATORY - ADULT  Goal: Achieves optimal ventilation and oxygenation  Description: INTERVENTIONS:  - Assess for changes in respiratory status  - Assess for changes in mentation and behavior  - Position to facilitate oxygenation and minimize respiratory effort  - Oxygen administered by appropriate delivery if ordered  - Initiate smoking cessation education as indicated  - Encourage broncho-pulmonary hygiene including cough, deep breathe, Incentive Spirometry  - Assess the need for suctioning and aspirate as needed  - Assess and instruct to report SOB or any respiratory difficulty  - Respiratory Therapy support as indicated  Outcome: Progressing     Problem: SKIN/TISSUE INTEGRITY - ADULT  Goal: Skin Integrity remains intact(Skin Breakdown Prevention)  Description: Assess:  -Perform Flavio assessment every shift   -Clean and moisturize skin every day   -Inspect skin when repositioning, toileting, and assisting with ADLS  -Assess under medical devices such as ronny  every hour   -Assess extremities for adequate circulation and sensation     Bed Management:  -Have minimal linens on bed & keep smooth, unwrinkled  -Change linens as needed when moist or perspiring  -Avoid sitting or lying in one position for more than 2 hours while in bed  -Keep HOB at 45 degrees     Toileting:  -Offer bedside commode  -Assess for incontinence every hour  -Use incontinent care products after each incontinent episode such as moisture barrier cream     Activity:  -Mobilize patient 3 times a day  -Encourage activity and walks on unit  -Encourage or provide ROM exercises   -Turn and reposition patient every 2 Hours  -Use appropriate equipment to lift or move  patient in bed  -Instruct/ Assist with weight shifting every hour when out of bed in chair  -Consider limitation of chair time 2 hour intervals    Skin Care:  -Avoid use of baby powder, tape, friction and shearing, hot water or constrictive clothing  -Relieve pressure over bony prominences using allevyn   -Do not massage red bony areas    Next Steps:  -Teach patient strategies to minimize risks such as weight shifting    -Consider consults to  interdisciplinary teams such as PT  Outcome: Progressing  Goal: Incision(s), wounds(s) or drain site(s) healing without S/S of infection  Description: INTERVENTIONS  - Assess and document dressing, incision, wound bed, drain sites and surrounding tissue  - Provide patient and family education  - Perform skin care/dressing changes every -  Outcome: Progressing  Goal: Pressure injury heals and does not worsen  Description: Interventions:  - Implement low air loss mattress or specialty surface (Criteria met)  - Apply silicone foam dressing  - Instruct/assist with weight shifting every - minutes when in chair   - Limit chair time to - hour intervals  - Use special pressure reducing interventions such as - when in chair   - Apply fecal or urinary incontinence containment device   - Perform passive or active ROM every -  - Turn and reposition patient & offload bony prominences every - hours   - Utilize friction reducing device or surface for transfers   - Consider consults to  interdisciplinary teams such as -  - Use incontinent care products after each incontinent episode such as ---  - Consider nutrition services referral as needed  Outcome: Progressing     Problem: Potential for Falls  Goal: Patient will remain free of falls  Description: INTERVENTIONS:  - Educate patient/family on patient safety including physical limitations  - Instruct patient to call for assistance with activity   - Consult OT/PT to assist with strengthening/mobility   - Keep Call bell within reach  - Keep  bed low and locked with side rails adjusted as appropriate  - Keep care items and personal belongings within reach  - Initiate and maintain comfort rounds  - Make Fall Risk Sign visible to staff  - Offer Toileting every 2 Hours, in advance of need  - Initiate/Maintain bed alarm  - Obtain necessary fall risk management equipment: non skid footwear  - Apply yellow socks and bracelet for high fall risk patients  - Consider moving patient to room near nurses station  Outcome: Progressing     Problem: Nutrition/Hydration-ADULT  Goal: Nutrient/Hydration intake appropriate for improving, restoring or maintaining nutritional needs  Description: Monitor and assess patient's nutrition/hydration status for malnutrition. Collaborate with interdisciplinary team and initiate plan and interventions as ordered.  Monitor patient's weight and dietary intake as ordered or per policy. Utilize nutrition screening tool and intervene as necessary. Determine patient's food preferences and provide high-protein, high-caloric foods as appropriate.     INTERVENTIONS:  - Monitor oral intake, urinary output, labs, and treatment plans  - Assess nutrition and hydration status and recommend course of action  - Evaluate amount of meals eaten  - Assist patient with eating if necessary   - Allow adequate time for meals  - Recommend/ encourage appropriate diets, oral nutritional supplements, and vitamin/mineral supplements  - Order, calculate, and assess calorie counts as needed  - Recommend, monitor, and adjust tube feedings and TPN/PPN based on assessed needs  - Assess need for intravenous fluids  - Provide specific nutrition/hydration education as appropriate  - Include patient/family/caregiver in decisions related to nutrition  Outcome: Progressing     Problem: COPING  Goal: Pt/Family able to verbalize concerns and demonstrate effective coping strategies  Description: INTERVENTIONS:  - Assist patient/family to identify coping skills, available  support systems and cultural and spiritual values  - Provide emotional support, including active listening and acknowledgement of concerns of patient and caregivers  - Reduce environmental stimuli, as able  - Provide patient education  - Assess for spiritual pain/suffering and initiate spiritual care, including notification of Pastoral Care or [place] based community as needed  - Assess effectiveness of coping strategies  Outcome: Progressing  Goal: Will report anxiety at manageable levels  Description: INTERVENTIONS:  - Administer medication as ordered  - Teach and encourage coping skills  - Provide emotional support  - Assess patient/family for anxiety and ability to cope  Outcome: Progressing     Problem: BEHAVIOR  Goal: Pt/Family maintain appropriate behavior and adhere to behavioral management agreement, if implemented  Description: INTERVENTIONS:  - Assess the family dynamic   - Encourage verbalization of thoughts and concerns in a socially appropriate manner  - Assess patient/family's coping skills and non-compliant behavior (including use of illegal substances).  - Utilize positive, consistent limit setting strategies supporting safety of patient, staff and others  - Initiate consult with Case Management, Spiritual Care or other ancillary services as appropriate  - If a patient's/visitor's behavior jeopardizes the safety of the patient, staff, or others, refer to organization procedure.   - Notify Security of behavior or suspected illegal substances which indicate the need for search of the patient and/or belongings  - Encourage participation in the decision making process about a behavioral management agreement; implement if patient meets criteria  Outcome: Progressing     Problem: NEUROSENSORY - ADULT  Goal: Achieves stable or improved neurological status  Description: INTERVENTIONS  - Monitor and report changes in neurological status  - Monitor vital signs such as temperature, blood pressure, glucose, and  any other labs ordered   - Initiate measures to prevent increased intracranial pressure  - Monitor for seizure activity and implement precautions if appropriate      Outcome: Progressing  Goal: Achieves maximal functionality and self care  Description: INTERVENTIONS  - Monitor swallowing and airway patency with patient fatigue and changes in neurological status  - Encourage and assist patient to increase activity and self care.   - Encourage visually impaired, hearing impaired and aphasic patients to use assistive/communication devices  Outcome: Progressing     Problem: CARDIOVASCULAR - ADULT  Goal: Maintains optimal cardiac output and hemodynamic stability  Description: INTERVENTIONS:  - Monitor I/O, vital signs and rhythm  - Monitor for S/S and trends of decreased cardiac output  - Administer and titrate ordered vasoactive medications to optimize hemodynamic stability  - Assess quality of pulses, skin color and temperature  - Assess for signs of decreased coronary artery perfusion  - Instruct patient to report change in severity of symptoms  Outcome: Progressing  Goal: Absence of cardiac dysrhythmias or at baseline rhythm  Description: INTERVENTIONS:  - Continuous cardiac monitoring, vital signs, obtain 12 lead EKG if ordered  - Administer antiarrhythmic and heart rate control medications as ordered  - Monitor electrolytes and administer replacement therapy as ordered  Outcome: Progressing     Problem: GASTROINTESTINAL - ADULT  Goal: Minimal or absence of nausea and/or vomiting  Description: INTERVENTIONS:  - Administer IV fluids if ordered to ensure adequate hydration  - Maintain NPO status until nausea and vomiting are resolved  - Nasogastric tube if ordered  - Administer ordered antiemetic medications as needed  - Provide nonpharmacologic comfort measures as appropriate  - Advance diet as tolerated, if ordered  - Consider nutrition services referral to assist patient with adequate nutrition and appropriate food  choices  Outcome: Progressing  Goal: Maintains or returns to baseline bowel function  Description: INTERVENTIONS:  - Assess bowel function  - Encourage oral fluids to ensure adequate hydration  - Administer IV fluids if ordered to ensure adequate hydration  - Administer ordered medications as needed  - Encourage mobilization and activity  - Consider nutritional services referral to assist patient with adequate nutrition and appropriate food choices  Outcome: Progressing  Goal: Maintains adequate nutritional intake  Description: INTERVENTIONS:  - Monitor percentage of each meal consumed  - Identify factors contributing to decreased intake, treat as appropriate  - Assist with meals as needed  - Monitor I&O, weight, and lab values if indicated  - Obtain nutrition services referral as needed  Outcome: Progressing  Goal: Establish and maintain optimal ostomy function  Description: INTERVENTIONS:  - Assess bowel function  - Encourage oral fluids to ensure adequate hydration  - Administer IV fluids if ordered to ensure adequate hydration   - Administer ordered medications as needed  - Encourage mobilization and activity  - Nutrition services referral to assist patient with appropriate food choices  - Assess stoma site  - Consider wound care consult   Outcome: Progressing  Goal: Oral mucous membranes remain intact  Description: INTERVENTIONS  - Assess oral mucosa and hygiene practices  - Implement preventative oral hygiene regimen  - Implement oral medicated treatments as ordered  - Initiate Nutrition services referral as needed  Outcome: Progressing     Problem: GENITOURINARY - ADULT  Goal: Maintains or returns to baseline urinary function  Description: INTERVENTIONS:  - Assess urinary function  - Encourage oral fluids to ensure adequate hydration if ordered  - Administer IV fluids as ordered to ensure adequate hydration  - Administer ordered medications as needed  - Offer frequent toileting  - Follow urinary retention  protocol if ordered  Outcome: Progressing  Goal: Urinary catheter remains patent  Description: INTERVENTIONS:  - Assess patency of urinary catheter  - If patient has a chronic marie, consider changing catheter if non-functioning  - Follow guidelines for intermittent irrigation of non-functioning urinary catheter  Outcome: Progressing     Problem: METABOLIC, FLUID AND ELECTROLYTES - ADULT  Goal: Electrolytes maintained within normal limits  Description: INTERVENTIONS:  - Monitor labs and assess patient for signs and symptoms of electrolyte imbalances  - Administer electrolyte replacement as ordered  - Monitor response to electrolyte replacements, including repeat lab results as appropriate  - Instruct patient on fluid and nutrition as appropriate  Outcome: Progressing  Goal: Fluid balance maintained  Description: INTERVENTIONS:  - Monitor labs   - Monitor I/O and WT  - Instruct patient on fluid and nutrition as appropriate  - Assess for signs & symptoms of volume excess or deficit  Outcome: Progressing  Goal: Glucose maintained within target range  Description: INTERVENTIONS:  - Monitor Blood Glucose as ordered  - Assess for signs and symptoms of hyperglycemia and hypoglycemia  - Administer ordered medications to maintain glucose within target range  - Assess nutritional intake and initiate nutrition service referral as needed  Outcome: Progressing     Problem: HEMATOLOGIC - ADULT  Goal: Maintains hematologic stability  Description: INTERVENTIONS  - Assess for signs and symptoms of bleeding or hemorrhage  - Monitor labs  - Administer supportive blood products/factors as ordered and appropriate  Outcome: Progressing     Problem: MUSCULOSKELETAL - ADULT  Goal: Maintain or return mobility to safest level of function  Description: INTERVENTIONS:  - Assess patient's ability to carry out ADLs; assess patient's baseline for ADL function and identify physical deficits which impact ability to perform ADLs (bathing, care of  mouth/teeth, toileting, grooming, dressing, etc.)  - Assess/evaluate cause of self-care deficits   - Assess range of motion  - Assess patient's mobility  - Assess patient's need for assistive devices and provide as appropriate  - Encourage maximum independence but intervene and supervise when necessary  - Involve family in performance of ADLs  - Assess for home care needs following discharge   - Consider OT consult to assist with ADL evaluation and planning for discharge  - Provide patient education as appropriate  Outcome: Progressing

## 2024-03-16 NOTE — PROGRESS NOTES
[facility]  Progress Note: Critical Care  Name: Carla Hunt 59 y.o. 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  Midline incision with wound dehiscence 2/2 poor wound healing s/p wound vac 3/13  ELIESER on CKD3  Anemia chronic inflammation  History of B-Cell lymphoma, Rituxan maintenance therapy on hold  Minimal SAH POA, no interventions required  Seizure disorder; on home AEDs  Steroid induced hyperglycemia; on insulin gtt   Elevated TG  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   vEEG 1/10-12 negative for seizure like activity   Only opens eyes to voice only and not following commands on frequent neuro checks despite prior sedation holiday after extubation 3/12, new from prior to intubation 3/11. PERRL  CTH 3/13 negative for acute intracranial findings.      Plan:  MRI brain w wo contrast to evaluate for encephalitis/inflammatory process   Frequent neurochecks q1h  Avoid PRN fentanyl/sedative meds as able   Continue home AEDs as below  Can consider LP if further clincally indicated pending workup  Video EEG deferred at thist time due to low suspicion for seizures     Diagnosis: Analgesia  PRN Fentanyl 50mcg/hr      Diagnosis:  seizure disorder  S/p partial left temporal lobe resection in 2007  On Lamictal 150 bid and Topamax 100 bid  Last lamotrigine level from 03/02 at 10.7 (therapeutic)     Diagnosis: Anxiety  On Effexor 25 mg QD     CV:   Diagnosis: Shock  Likely mixed distributive and hypovolemic  TTE 2/20 with EF 70%, no WMA, no significant valvular dysfunction, G1 DD.  Plan:  Levophed weaned to off  Maintain MAPs > 65 mmHg      Pulm:  Diagnosis: Acute hypoxic respiratory failure   In setting of severe COVID, POA, persistently positive since admission.  Likely multifactorial due to COVID pneumonitis in setting of persistent COVID-19 infection and possible recurrent bacterial pneumonia.Persistent inflammation and fibrosis also likely given patient has been steroid responsive throughout admission.  Previously completed course of remdesivir/dexamethasone/Actemra.  Resumed Paxlovid and remdesivir after COVID PCR positive on 3/3. Has had multiple bronchoscopies throughout admission (2/2, 2/16, 2/21) subsequent Legionella/viral/fungal/AFB culture negative.    Previously intubated 2/13-3/1.  Reintubated 3/11 due to increased WOB in setting of elevated CRP; Mucopurulent secretions noted. Repeat BAL studies obtained 3/11.   S/P tracheostomy 3/12  CRP continues downward trend, most recent CRP 32, compared to 73.6 prior  Repeat CXR 3/12 with grossly unchanged diffuse parenchymal disease   Plan:  Finished Paxlovid/remdesivir through 3/15 to complete 14d   Vanco/Cefepime now stopped, appreciate pharm input for vanco dosing  Continue Bactrim 2 DS tabs bid crushed via NGT to tx Stenotrophomonas PNA as below (3/16 CrCl 38)  Follow-up BAL Cx's and studies including CD4/CD8 count, leukemia/lymphoma panel,PCP pcr  IV Solu-Medrol 50 mg q6h. Plan for slow steroid wean by 10mg every 3d until patient is down to 10mg q6h and then start decreasing frequency till patient is off  Will consider starting steroid sparing therapy in consultation with pharmacy if  patient able to tolerate steroid wean  Trend CRP daily to guide steroid therapy  ID following, recommend broadening antibiotic coverage from cefepime to meropenem 1 g IV Q8h if patient clinically deteriorates with concerns of progressive sepsis     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  Complicated by poor wound healing of midline incision as evidenced by wound dehiscence s/p wound vac   Stoma with dark brown output, consistent with prior  Plan:  Monitor output of ostomy pouch and Hemoglobin  Surgery following, will keep them updated if any further changes  Tube feeds running  Wound vac changed on 3/15     :   Diagnosis: ELIESER on CKD III   Baseline Cr appears to be 0.8-1.2)  Cr 1.55 > 1.65  S/p 1 L IVF  UO adequate on Ortiz, draining clear yellow urine    Plan:  Trend daily BMP  Continue to monitor I/O and UOP     F/E/N:   F: none  E: monitor and replete for goal K>4, P>3, Mg>2; Potassium repleted today  N: On tube feeds with vital 1.5; 45 cc/h, Prosource liquid protein to 1 packet BID     Heme/Onc:   Diagnosis: Anemia  Hgb recently has been around 7-8.  Transfused 2U on 3/13 due to Hgb <6.  Total of 6U transfused since admission  Likely multifactorial in nature, acute versus early nearly chronic in the setting of inflammatory state as evidenced by ferritin 974, likely further complicated by chronic anemia due to her other history of lymphoma and CKD  Plan:  Trend CBC, transfuse to maintain Hgb>7     Diagnosis: B cell lymphoma  [facility]  S/P 6 cycles of chemotherapy   Maintained on Rituxan for 2 year course PTA, started May 2022  Last dose was 12/20, treatment currently on hold   Leukopenic on admission but WBC now limited likely in setting of steroid therapy     Plan:  Holding rituximab in setting of persistent COVID-19 infection      DVT ppx with lovenox     Endo:   Diagnosis: steroid hyperglycemia and diabetes mellitus type 2, A1c at 6.6  from 01/2024  Goal -180  BG range last 24hrs 113-174  Plan:   On insulin gtt        ID:   Diagnosis: Recurrent bacterial pneumonia  Patient has completed multiple treatment courses of remdesivir throughout hospitalization in addition to 7 days of ceftriaxone.   BAL cultures in 2/2024 positive for MDR Pseudomonas and stenotrophomonas treated with 10d course of Levaquin  CT C/A/P 3/10 with persistent groundglass opacities and changes suggestive of sequelae of COVID, prior infections.  Completed 10d course of cefepime for broad spectrum coveragge (completed (3/13)  Repeat BAL cultures from 3/11 showing 4+ growth Stenotrophomonas maltophilia. Could be colonization given prior BAL growth of same, however due to recent intubation with prolonged corticosteroid theraphy, will begin treating as true pathogen. Patient otherwise remains afebrile, no leukocytosis. CRP with down trending.   Plan:  Bactrim 2 DS tabs BID crushed via NGT, plan to treat for 10d course through 3/23  Follow up BAL cultures and sensitivities        MSK/Skin:   Diagnosis: Midline incision wound with poor wound healing  General surgery performed staple removal of the patient's midline incision on 3/12 with some skin signs appreciated at the inferior aspect of the wound without obvious pus drainage. Overnight 3/13, wound dehiscence progressed requiring general surgery reevaluation. Red/brown aspirate noted, fascia intact. Wet-to-dry dressings in place. General surgery following for next Epson wound care.   Suspect poor wound healing in setting of high-dose steroid therapy.  No  exam no obvious signs of infection at this time.    S/P wound vac placement 3/13 as per gen surgery. No active concerns for cellulitis.   Plan:   Routine monitoring, wound care as per general surgery.     Diagnosis: Critical illness myopathy  Likely exacerbated by high-dose steroid therapy  Plan:  Continue IV Solu-Medrol as above    Disposition: Critical care    ICU Core  Measures     Vented Patient  VAP Bundle  VAP bundle ordered     A: Assess, Prevent, and Manage Pain Has pain been assessed? Yes  Need for changes to pain regimen? No   B: Both Spontaneous Awakening Trials (SATs) and Spontaneous Breathing Trials (SBTs) Plan to perform spontaneous awakening trial today? Yes   Plan to perform spontaneous breathing trial today? N/A   Obvious barriers to extubation? NA   C: Choice of Sedation RASS Goal: -1 Drowsy or 0 Alert and Calm  Need for changes to sedation or analgesia regimen? No   D: Delirium CAM-ICU: Unable to perform secondary to Acute cognitive dysfunction   E: Early Mobility  Plan for early mobility? Yes   F: Family Engagement Plan for family engagement today? Yes       Antibiotic Review: Patient on appropriate coverage based on culture data. , Awaiting culture results. , and Infectious disease consulted    Review of Invasive Devices:    Diana Plan: Continue for accurate I/O monitoring for 48 hours  Central access plan: Patient has multiple central venous catheters.   Amelia Plan: Keep arterial line for hemodynamic monitoring and frequent ABGs    Prophylaxis:  VTE VTE covered by:  heparin (porcine), Subcutaneous, 5,000 Units at 03/16/24 0527       Stress Ulcer  not ordered        Significant 24hr Events     24hr events:   Required fentanyl 50 mcg PRN x 3 at 1500, midnight, and 0530 overnight.   Switched from Pressure support to pressure control 16/12/6/40% due to apnea at nighttime.   Gtt: only on insulin gtt   Subjective     Review of Systems   Unable to perform ROS: Acuity of condition        Objective                            Vitals I/O      Most Recent Min/Max in 24hrs   Temp 98.6 °F (37 °C) Temp  Min: 98.3 °F (36.8 °C)  Max: 98.7 °F (37.1 °C)   Pulse 101 Pulse  Min: 101  Max: 114   Resp 12 Resp  Min: 12  Max: 35   /52 No data recorded   O2 Sat 94 % SpO2  Min: 92 %  Max: 98 %      Intake/Output Summary (Last 24 hours) at 3/16/2024 0631  Last data filed at 3/16/2024  0621  Gross per 24 hour   Intake 2798.7 ml   Output 1726 ml   Net 1072.7 ml       Diet Enteral/Parenteral; Tube Feeding No Oral Diet; Vital 1.5; Continuous; 45; Prosource Protein Liquid - One Packet; OD; 300; Water; Every 6 hours    Invasive Monitoring           Physical Exam   Physical Exam  Eyes:      Conjunctiva/sclera: Conjunctivae normal.   HENT:      Head: Normocephalic and atraumatic.      Mouth/Throat:      Mouth: Mucous membranes are dry.   Neck:      Trachea: Tracheostomy present.   Cardiovascular:      Rate and Rhythm: Regular rhythm. Tachycardia present.   Musculoskeletal:      Right lower leg: No edema.      Left lower leg: No edema.   Abdominal:      Tenderness: There is abdominal tenderness.      Comments: Wound vac in place - 100 cc output red/black clots  Ostomy bag with red/black stool output   Constitutional:       Appearance: She is ill-appearing.   Pulmonary:      Breath sounds: Normal breath sounds.   Secretions are normal.Neurological:      Mental Status: She is alert.      Comments: Sticks out tongue to command, but does not move ext to command  Opens eyes   Genitourinary/Anorectal:  Ortiz present.          Diagnostic Studies        Imaging: no new imaging overnight.      Medications:  Scheduled PRN   chlorhexidine, 15 mL, Q12H SARTHAK  heparin (porcine), 5,000 Units, Q8H SARTHAK  ipratropium, 0.5 mg, TID  lamoTRIgine, 150 mg, BID  levalbuterol, 1.25 mg, TID  methylPREDNISolone sodium succinate, 50 mg, Q6H SARTHAK   Followed by  [START ON 3/17/2024] methylPREDNISolone sodium succinate, 40 mg, Q6H SARTHAK   Followed by  [START ON 3/20/2024] methylPREDNISolone sodium succinate, 30 mg, Q6H SARTHAK   Followed by  [START ON 3/23/2024] methylPREDNISolone sodium succinate, 20 mg, Q6H SARTHAK   Followed by  [START ON 3/26/2024] methylPREDNISolone sodium succinate, 10 mg, Q6H SARTHAK   Followed by  [START ON 3/29/2024] methylPREDNISolone sodium succinate, 10 mg, Q8H SARTHAK   Followed by  [START ON 4/1/2024] methylPREDNISolone  sodium succinate, 10 mg, Q12H SARTHAK   Followed by  [START ON 4/8/2024] methylPREDNISolone sodium succinate, 10 mg, Daily  nystatin, , BID  polyethylene glycol, 17 g, Daily  propofol, 100 mg, Once  sodium chloride, 2 spray, Q4H  sulfamethoxazole-trimethoprim, 2 tablet, Q12H SARTHAK  topiramate, 50 mg, BID  venlafaxine, 25 mg, Daily      acetaminophen, 650 mg, Q6H PRN  fentaNYL, 50 mcg, Q1H PRN  OLANZapine, 10 mg, HS PRN  ondansetron, 4 mg, Q6H PRN  oxyCODONE, 2.5 mg, Q4H PRN   Or  oxyCODONE, 5 mg, Q4H PRN  sodium chloride, 1 Application, Q1H PRN       Continuous    insulin regular (HumuLIN R,NovoLIN R) 1 Units/mL in sodium chloride 0.9 % 100 mL infusion, 0.3-21 Units/hr, Last Rate: 4 Units/hr (03/16/24 0615)  norepinephrine, 1-30 mcg/min, Last Rate: Stopped (03/15/24 0525)         Labs:    CBC    Recent Labs     03/15/24  0603 03/16/24  0611   WBC 7.59 9.47   HGB 7.1* 7.4*   HCT 21.2* 22.2*   * 139*     CRP: 18 (<32<74<106<166)   BMP    Recent Labs     03/15/24  0603 03/16/24  0611   SODIUM 138 137   K 3.3* 3.9   * 107   CO2 18* 18*   AGAP 11 12   BUN 61* 65*   CREATININE 1.59* 1.65*   CALCIUM 7.6* 8.2*      BS trend: 140-190s     Coags    No recent results     Additional Electrolytes  Recent Labs     03/15/24  0603 03/15/24  1700   MG 1.9  --    PHOS 3.8  --    CAIONIZED  --  1.14          Blood Gas    Recent Labs     03/14/24  1840   PHART 7.330*   [id number] 36.3   PO2ART 90.7   [id number] 18.7*   BEART -6.6   SOURCE Line, Arterial     Recent Labs     03/14/24  1840   SOURCE Line, Arterial    LFTs  No recent results        Infectious  Bronchial culture with 4+ Stenotrophomonas, 2+ candida albicans, 1+ budding yeast      AFB/fungal/viral bronchial Cx's pending  PCP smear (BAL) negative  Fungal (BAL): 4+ yeast     Glucose  Recent Labs     03/15/24  0603   GLUC 133               Emilie Soyeon Kim, MD

## 2024-03-17 LAB
ANION GAP SERPL CALCULATED.3IONS-SCNC: 13 MMOL/L (ref 4–13)
AORTIC ROOT: 4.1 CM
AORTIC VALVE MEAN VELOCITY: 10.9 M/S
APICAL FOUR CHAMBER EJECTION FRACTION: 63 %
ASCENDING AORTA: 4.1 CM
AV AREA BY CONTINUOUS VTI: 2.1 CM2
AV AREA PEAK VELOCITY: 2.5 CM2
AV LVOT MEAN GRADIENT: 6 MMHG
AV LVOT PEAK GRADIENT: 10 MMHG
AV MEAN GRADIENT: 5 MMHG
AV PEAK GRADIENT: 10 MMHG
AV VALVE AREA: 2.14 CM2
AV VELOCITY RATIO: 0.98
BASE EXCESS BLDA CALC-SCNC: -6.1 MMOL/L
BSA FOR ECHO PROCEDURE: 1.9 M2
BUN SERPL-MCNC: 71 MG/DL (ref 5–25)
CALCIUM SERPL-MCNC: 8.2 MG/DL (ref 8.4–10.2)
CHLORIDE SERPL-SCNC: 105 MMOL/L (ref 96–108)
CHLORIDE UR-SCNC: 35 MMOL/L
CO2 SERPL-SCNC: 19 MMOL/L (ref 21–32)
CREAT SERPL-MCNC: 1.65 MG/DL (ref 0.6–1.3)
CRP SERPL QL: 78.9 MG/L
DOP CALC AO PEAK VEL: 1.6 M/S
DOP CALC AO VTI: 31.99 CM
DOP CALC LVOT AREA: 2.54 CM2
DOP CALC LVOT CARDIAC INDEX: 3.3 L/MIN/M2
DOP CALC LVOT CARDIAC OUTPUT: 6.26 L/MIN
DOP CALC LVOT DIAMETER: 1.8 CM
DOP CALC LVOT PEAK VEL VTI: 26.97 CM
DOP CALC LVOT PEAK VEL: 1.56 M/S
DOP CALC LVOT STROKE INDEX: 34.7 ML/M2
DOP CALC LVOT STROKE VOLUME: 66
ERYTHROCYTE [DISTWIDTH] IN BLOOD BY AUTOMATED COUNT: 21 % (ref 11.6–15.1)
GFR SERPL CREATININE-BSD FRML MDRD: 33 ML/MIN/1.73SQ M
GLOBAL LONGITUIDAL STRAIN: -14 %
GLUCOSE SERPL-MCNC: 125 MG/DL (ref 65–140)
GLUCOSE SERPL-MCNC: 139 MG/DL (ref 65–140)
GLUCOSE SERPL-MCNC: 141 MG/DL (ref 65–140)
GLUCOSE SERPL-MCNC: 143 MG/DL (ref 65–140)
GLUCOSE SERPL-MCNC: 146 MG/DL (ref 65–140)
GLUCOSE SERPL-MCNC: 151 MG/DL (ref 65–140)
GLUCOSE SERPL-MCNC: 152 MG/DL (ref 65–140)
GLUCOSE SERPL-MCNC: 163 MG/DL (ref 65–140)
GLUCOSE SERPL-MCNC: 165 MG/DL (ref 65–140)
GLUCOSE SERPL-MCNC: 169 MG/DL (ref 65–140)
GLUCOSE SERPL-MCNC: 170 MG/DL (ref 65–140)
GLUCOSE SERPL-MCNC: 211 MG/DL (ref 65–140)
HCO3 BLDA-SCNC: 18.4 MMOL/L (ref 22–28)
HCT VFR BLD AUTO: 21.5 % (ref 34.8–46.1)
HGB BLD-MCNC: 6.8 G/DL (ref 11.5–15.4)
HGB BLD-MCNC: 7.5 G/DL (ref 11.5–15.4)
LACTATE SERPL-SCNC: 1.6 MMOL/L (ref 0.5–2)
MAGNESIUM SERPL-MCNC: 2.2 MG/DL (ref 1.9–2.7)
MCH RBC QN AUTO: 30.4 PG (ref 26.8–34.3)
MCHC RBC AUTO-ENTMCNC: 31.6 G/DL (ref 31.4–37.4)
MCV RBC AUTO: 96 FL (ref 82–98)
O2 CT BLDA-SCNC: 10.5 ML/DL (ref 16–23)
OXYHGB MFR BLDA: 90.3 % (ref 94–97)
PCO2 BLDA: 32.1 MM HG (ref 36–44)
PH BLDA: 7.38 [PH] (ref 7.35–7.45)
PHOSPHATE SERPL-MCNC: 3.6 MG/DL (ref 2.7–4.5)
PLATELET # BLD AUTO: 124 THOUSANDS/UL (ref 149–390)
PMV BLD AUTO: 12.8 FL (ref 8.9–12.7)
PO2 BLDA: 64.4 MM HG (ref 75–129)
POTASSIUM SERPL-SCNC: 4.3 MMOL/L (ref 3.5–5.3)
POTASSIUM UR-SCNC: 32.7 MMOL/L
PS CM H2O: 12
PS VENT FIO2: 40
PS VENT PEEP: 6
RBC # BLD AUTO: 2.24 MILLION/UL (ref 3.81–5.12)
RIGHT VENTRICLE ID DIMENSION: 3.1 CM
SINOTUBULAR JUNCTION: 3.2 CM
SL CV LV EF: 70
SODIUM 24H UR-SCNC: 40 MOL/L
SODIUM SERPL-SCNC: 137 MMOL/L (ref 135–147)
SPECIMEN SOURCE: ABNORMAL
STJ: 3.2 CM
VENT - PS: ABNORMAL
WBC # BLD AUTO: 11.78 THOUSAND/UL (ref 4.31–10.16)

## 2024-03-17 PROCEDURE — 0BJ08ZZ INSPECTION OF TRACHEOBRONCHIAL TREE, VIA NATURAL OR ARTIFICIAL OPENING ENDOSCOPIC: ICD-10-PCS | Performed by: INTERNAL MEDICINE

## 2024-03-17 PROCEDURE — 82805 BLOOD GASES W/O2 SATURATION: CPT | Performed by: INTERNAL MEDICINE

## 2024-03-17 PROCEDURE — 31645 BRNCHSC W/THER ASPIR 1ST: CPT | Performed by: INTERNAL MEDICINE

## 2024-03-17 PROCEDURE — 84300 ASSAY OF URINE SODIUM: CPT

## 2024-03-17 PROCEDURE — 85018 HEMOGLOBIN: CPT | Performed by: INTERNAL MEDICINE

## 2024-03-17 PROCEDURE — 82948 REAGENT STRIP/BLOOD GLUCOSE: CPT

## 2024-03-17 PROCEDURE — 94640 AIRWAY INHALATION TREATMENT: CPT

## 2024-03-17 PROCEDURE — 84100 ASSAY OF PHOSPHORUS: CPT

## 2024-03-17 PROCEDURE — 94760 N-INVAS EAR/PLS OXIMETRY 1: CPT

## 2024-03-17 PROCEDURE — 94003 VENT MGMT INPAT SUBQ DAY: CPT

## 2024-03-17 PROCEDURE — 85027 COMPLETE CBC AUTOMATED: CPT | Performed by: STUDENT IN AN ORGANIZED HEALTH CARE EDUCATION/TRAINING PROGRAM

## 2024-03-17 PROCEDURE — 94664 DEMO&/EVAL PT USE INHALER: CPT

## 2024-03-17 PROCEDURE — 83735 ASSAY OF MAGNESIUM: CPT | Performed by: STUDENT IN AN ORGANIZED HEALTH CARE EDUCATION/TRAINING PROGRAM

## 2024-03-17 PROCEDURE — 86140 C-REACTIVE PROTEIN: CPT | Performed by: STUDENT IN AN ORGANIZED HEALTH CARE EDUCATION/TRAINING PROGRAM

## 2024-03-17 PROCEDURE — 83605 ASSAY OF LACTIC ACID: CPT | Performed by: INTERNAL MEDICINE

## 2024-03-17 PROCEDURE — 84133 ASSAY OF URINE POTASSIUM: CPT

## 2024-03-17 PROCEDURE — 80048 BASIC METABOLIC PNL TOTAL CA: CPT | Performed by: STUDENT IN AN ORGANIZED HEALTH CARE EDUCATION/TRAINING PROGRAM

## 2024-03-17 PROCEDURE — 82436 ASSAY OF URINE CHLORIDE: CPT

## 2024-03-17 RX ORDER — PROPOFOL 10 MG/ML
5-50 INJECTION, EMULSION INTRAVENOUS
Status: DISCONTINUED | OUTPATIENT
Start: 2024-03-17 | End: 2024-03-18

## 2024-03-17 RX ORDER — SODIUM CHLORIDE, SODIUM GLUCONATE, SODIUM ACETATE, POTASSIUM CHLORIDE, MAGNESIUM CHLORIDE, SODIUM PHOSPHATE, DIBASIC, AND POTASSIUM PHOSPHATE .53; .5; .37; .037; .03; .012; .00082 G/100ML; G/100ML; G/100ML; G/100ML; G/100ML; G/100ML; G/100ML
500 INJECTION, SOLUTION INTRAVENOUS ONCE
Status: COMPLETED | OUTPATIENT
Start: 2024-03-17 | End: 2024-03-17

## 2024-03-17 RX ORDER — LIDOCAINE HYDROCHLORIDE 10 MG/ML
INJECTION, SOLUTION EPIDURAL; INFILTRATION; INTRACAUDAL; PERINEURAL
Status: COMPLETED
Start: 2024-03-17 | End: 2024-03-17

## 2024-03-17 RX ORDER — FENTANYL CITRATE 50 UG/ML
150 INJECTION, SOLUTION INTRAMUSCULAR; INTRAVENOUS ONCE
Status: COMPLETED | OUTPATIENT
Start: 2024-03-17 | End: 2024-03-17

## 2024-03-17 RX ORDER — METOPROLOL TARTRATE 50 MG/1
50 TABLET, FILM COATED ORAL EVERY 12 HOURS SCHEDULED
Status: DISCONTINUED | OUTPATIENT
Start: 2024-03-17 | End: 2024-03-17

## 2024-03-17 RX ORDER — LIDOCAINE HYDROCHLORIDE 20 MG/ML
10 INJECTION, SOLUTION EPIDURAL; INFILTRATION; INTRACAUDAL; PERINEURAL ONCE
Status: DISCONTINUED | OUTPATIENT
Start: 2024-03-17 | End: 2024-03-20

## 2024-03-17 RX ADMIN — SODIUM CHLORIDE SOLN NEBU 3% 4 ML: 3 NEBU SOLN at 19:50

## 2024-03-17 RX ADMIN — LORAZEPAM 0.5 MG: 2 INJECTION INTRAMUSCULAR; INTRAVENOUS at 20:33

## 2024-03-17 RX ADMIN — LAMOTRIGINE 150 MG: 100 TABLET ORAL at 17:38

## 2024-03-17 RX ADMIN — IPRATROPIUM BROMIDE 0.5 MG: 0.5 SOLUTION RESPIRATORY (INHALATION) at 19:51

## 2024-03-17 RX ADMIN — IPRATROPIUM BROMIDE 0.5 MG: 0.5 SOLUTION RESPIRATORY (INHALATION) at 13:48

## 2024-03-17 RX ADMIN — METOPROLOL TARTRATE 25 MG: 25 TABLET, FILM COATED ORAL at 09:26

## 2024-03-17 RX ADMIN — SODIUM CHLORIDE 3 UNITS/HR: 9 INJECTION, SOLUTION INTRAVENOUS at 06:05

## 2024-03-17 RX ADMIN — LEVALBUTEROL HYDROCHLORIDE 1.25 MG: 1.25 SOLUTION RESPIRATORY (INHALATION) at 13:48

## 2024-03-17 RX ADMIN — Medication 2 SPRAY: at 21:24

## 2024-03-17 RX ADMIN — METOPROLOL TARTRATE 25 MG: 25 TABLET, FILM COATED ORAL at 21:09

## 2024-03-17 RX ADMIN — LIDOCAINE HYDROCHLORIDE 300 MG: 10 INJECTION, SOLUTION EPIDURAL; INFILTRATION; INTRACAUDAL; PERINEURAL at 13:48

## 2024-03-17 RX ADMIN — CHLORHEXIDINE GLUCONATE 0.12% ORAL RINSE 15 ML: 1.2 LIQUID ORAL at 09:26

## 2024-03-17 RX ADMIN — HEPARIN SODIUM 5000 UNITS: 5000 INJECTION INTRAVENOUS; SUBCUTANEOUS at 06:10

## 2024-03-17 RX ADMIN — PROPOFOL 5 MCG/KG/MIN: 10 INJECTION, EMULSION INTRAVENOUS at 12:02

## 2024-03-17 RX ADMIN — HEPARIN SODIUM 5000 UNITS: 5000 INJECTION INTRAVENOUS; SUBCUTANEOUS at 21:25

## 2024-03-17 RX ADMIN — SODIUM CHLORIDE SOLN NEBU 3% 4 ML: 3 NEBU SOLN at 07:09

## 2024-03-17 RX ADMIN — TOPIRAMATE 50 MG: 100 TABLET, FILM COATED ORAL at 17:38

## 2024-03-17 RX ADMIN — LORAZEPAM 0.5 MG: 2 INJECTION INTRAMUSCULAR; INTRAVENOUS at 05:56

## 2024-03-17 RX ADMIN — VENLAFAXINE 37.5 MG: 25 TABLET ORAL at 09:28

## 2024-03-17 RX ADMIN — METHYLPREDNISOLONE SODIUM SUCCINATE 40 MG: 40 INJECTION, POWDER, FOR SOLUTION INTRAMUSCULAR; INTRAVENOUS at 05:57

## 2024-03-17 RX ADMIN — IPRATROPIUM BROMIDE 0.5 MG: 0.5 SOLUTION RESPIRATORY (INHALATION) at 07:09

## 2024-03-17 RX ADMIN — TOPIRAMATE 50 MG: 100 TABLET, FILM COATED ORAL at 09:26

## 2024-03-17 RX ADMIN — Medication 2 SPRAY: at 13:51

## 2024-03-17 RX ADMIN — FENTANYL CITRATE 150 MCG: 50 INJECTION INTRAMUSCULAR; INTRAVENOUS at 13:42

## 2024-03-17 RX ADMIN — SODIUM CHLORIDE, SODIUM GLUCONATE, SODIUM ACETATE, POTASSIUM CHLORIDE, MAGNESIUM CHLORIDE, SODIUM PHOSPHATE, DIBASIC, AND POTASSIUM PHOSPHATE 500 ML: .53; .5; .37; .037; .03; .012; .00082 INJECTION, SOLUTION INTRAVENOUS at 22:51

## 2024-03-17 RX ADMIN — LAMOTRIGINE 150 MG: 100 TABLET ORAL at 09:26

## 2024-03-17 RX ADMIN — CHLORHEXIDINE GLUCONATE 0.12% ORAL RINSE 15 ML: 1.2 LIQUID ORAL at 21:09

## 2024-03-17 RX ADMIN — SULFAMETHOXAZOLE AND TRIMETHOPRIM 2 TABLET: 800; 160 TABLET ORAL at 21:10

## 2024-03-17 RX ADMIN — SODIUM CHLORIDE 6 UNITS/HR: 9 INJECTION, SOLUTION INTRAVENOUS at 21:30

## 2024-03-17 RX ADMIN — SODIUM CHLORIDE SOLN NEBU 3% 4 ML: 3 NEBU SOLN at 13:48

## 2024-03-17 RX ADMIN — METHYLPREDNISOLONE SODIUM SUCCINATE 40 MG: 40 INJECTION, POWDER, FOR SOLUTION INTRAMUSCULAR; INTRAVENOUS at 11:08

## 2024-03-17 RX ADMIN — NYSTATIN: 100000 POWDER TOPICAL at 09:27

## 2024-03-17 RX ADMIN — Medication 2 SPRAY: at 17:39

## 2024-03-17 RX ADMIN — POLYETHYLENE GLYCOL 3350 17 G: 17 POWDER, FOR SOLUTION ORAL at 09:26

## 2024-03-17 RX ADMIN — Medication 2 SPRAY: at 06:10

## 2024-03-17 RX ADMIN — LEVALBUTEROL HYDROCHLORIDE 1.25 MG: 1.25 SOLUTION RESPIRATORY (INHALATION) at 07:09

## 2024-03-17 RX ADMIN — METHYLPREDNISOLONE SODIUM SUCCINATE 40 MG: 40 INJECTION, POWDER, FOR SOLUTION INTRAMUSCULAR; INTRAVENOUS at 17:38

## 2024-03-17 RX ADMIN — SULFAMETHOXAZOLE AND TRIMETHOPRIM 2 TABLET: 800; 160 TABLET ORAL at 09:28

## 2024-03-17 RX ADMIN — METHYLPREDNISOLONE SODIUM SUCCINATE 40 MG: 40 INJECTION, POWDER, FOR SOLUTION INTRAMUSCULAR; INTRAVENOUS at 00:04

## 2024-03-17 RX ADMIN — Medication 2 SPRAY: at 09:27

## 2024-03-17 RX ADMIN — LEVALBUTEROL HYDROCHLORIDE 1.25 MG: 1.25 SOLUTION RESPIRATORY (INHALATION) at 19:51

## 2024-03-17 RX ADMIN — HEPARIN SODIUM 5000 UNITS: 5000 INJECTION INTRAVENOUS; SUBCUTANEOUS at 13:44

## 2024-03-17 RX ADMIN — Medication 2 SPRAY: at 02:23

## 2024-03-17 RX ADMIN — NYSTATIN: 100000 POWDER TOPICAL at 17:39

## 2024-03-17 RX ADMIN — METOPROLOL TARTRATE 25 MG: 25 TABLET, FILM COATED ORAL at 11:14

## 2024-03-17 NOTE — PROGRESS NOTES
[facility]  Progress Note: Critical Care  Name: Carla Hunt 59 y.o. 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  Midline incision with wound dehiscence 2/2 poor wound healing s/p wound vac 3/13  ELIESER on CKD3  Anemia chronic inflammation  History of B-Cell lymphoma, Rituxan maintenance therapy on hold  Minimal SAH POA, no interventions required  Seizure disorder; on home AEDs  Steroid induced hyperglycemia; on insulin gtt   Elevated TG  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   vEEG 1/10-12 negative for seizure like activity   Only opens eyes to voice only and not following commands on frequent neuro checks despite prior sedation holiday after extubation 3/12, new from prior to intubation 3/11. PERRL  CTH 3/13 negative for acute intracranial findings.      Plan:  Frequent neurochecks q1h  Avoid PRN fentanyl/sedative meds as able   Continue home AEDs as below  MRI brain deferred at this time due to improving neuro exam but can be obtained if neuro exam doesn't improve/ eval for encephalitis/inflammatory process   Can consider LP if further clincally indicated pending workup  Video EEG deferred at thist time due to low suspicion  for seizures     Diagnosis: Analgesia  PRN Fentanyl 50mcg/hr      Diagnosis: seizure disorder  S/p partial left temporal lobe resection in 2007  On Lamictal 150 bid and Topamax 100 bid  Last lamotrigine level from 03/02 at 10.7 (therapeutic)     Diagnosis: Anxiety  On Effexor 25 mg QD     CV:   Diagnosis: Shock, resolved  Likely mixed distributive and hypovolemic  TTE 2/20 with EF 70%, no WMA, no significant valvular dysfunction, G1 DD.  Plan:  Levophed weaned to off  Maintain MAPs > 65 mmHg     Diagnosis: sinus tachycardia  HR in 120s-130s  In setting of hx of anxiety on venlafaxine  Per chart review has been persistently tachy outpatient  S/P PRN ativan with mild improvement  Unclear etiology. ?anemia ?dehydration however s/p IVF. ?pain ?underlying stenotrophomonas PNA  Plan for POC US to measure IVC/vol status  Await repeat H&H, transfuse to maintain Hgb>7  Continue home SNRI    Pulm:  Diagnosis: Acute hypoxic respiratory failure   In setting of severe COVID, POA, persistently positive since admission.  Likely multifactorial due to COVID pneumonitis in setting of persistent COVID-19 infection and possible recurrent bacterial pneumonia.Persistent inflammation and fibrosis also likely given patient has been steroid responsive throughout admission.  Previously completed course of remdesivir/dexamethasone/Actemra.  Resumed Paxlovid and remdesivir after COVID PCR positive on 3/3. Has had multiple bronchoscopies throughout admission (2/2, 2/16, 2/21) subsequent Legionella/viral/fungal/AFB culture negative.    Previously intubated 2/13-3/1.  Reintubated 3/11 due to increased WOB in setting of elevated CRP; Mucopurulent secretions noted. Repeat BAL studies obtained 3/11.   S/P tracheostomy 3/12  CRP continues downward trend, most recent CRP 32, compared to 73.6 prior  Repeat CXR 3/12 with grossly unchanged diffuse parenchymal disease   CRP trending back up at 78.9 on 3/17  Plan:  Finished Paxlovid/remdesivir through 3/15  to complete 14d   Vanco/Cefepime now stopped, appreciate pharm input for vanco dosing  Continue Bactrim 2 DS tabs bid crushed via NGT to tx Stenotrophomonas PNA as below; renally dosed  Follow-up BAL Cx's and studies including CD4/CD8 count, leukemia/lymphoma panel,PCP pcr  IV Solu-Medrol 40 mg q6h x3d. Plan for slow steroid wean by 10mg every 3d until patient is down to 10mg q6h and then start decreasing frequency till patient is off.  Will consider starting steroid sparing therapy in consultation with pharmacy if patient able to tolerate steroid wean.  Trend CRP daily to guide steroid therapy  ID following, recommend broadening antibiotic coverage from cefepime to meropenem 1 g IV Q8h if patient clinically deteriorates with concerns of progressive sepsis     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  Complicated by poor wound healing of midline incision as evidenced by wound dehiscence s/p wound vac   Stoma with dark brown output, consistent with prior  Plan:  Monitor output of ostomy pouch and Hemoglobin  Surgery following, will keep them updated if any further changes  Tube feeds running  Wound vac changed on 3/15     :   Diagnosis: ELIESER on CKD III   Baseline Cr appears to be 0.8-1.2)  Cr remains elevated but stable at 1.65 S/P IVF 1L  Elevated Cr 2/2 bactrim given no significant improvement with IVF  UO adequate on Ortiz, draining clear yellow urine    Plan:  Trend daily BMP  Continue to monitor I/O and UOP    Diagnosis: metabolic acidosis  Normal gap  In setting of ELIESER.     Plan:  Obtain ABG, ?urine studies    F/E/N:   F: none  E: monitor and replete for goal K>4, P>3, Mg>2; Potassium repleted today  N: On tube feeds with vital 1.5; 45 cc/h, Prosource liquid protein to 1 packet BID     Heme/Onc:   Diagnosis: Anemia  Hgb recently has been around 7-8.  Transfused 2U on 3/13 due to Hgb <6.  Total of 6U transfused since admission  Likely multifactorial in nature,  acute versus early nearly chronic in the setting of inflammatory state as evidenced by ferritin 974, likely further complicated by chronic anemia due to her other history of lymphoma and CKD  Plan:  Repeat H&H pending  Trend CBC, transfuse to maintain Hgb>7     Diagnosis: B cell lymphoma  Follows with heme/onc at [facility]  S/P 6 cycles of chemotherapy   Maintained on Rituxan for 2 year course PTA, started May 2022  Last dose was 12/20, treatment currently on hold   Leukopenic on admission but WBC now limited likely in setting of steroid therapy     Plan:  Holding rituximab in setting of persistent COVID-19 infection      DVT ppx with lovenox     Endo:   Diagnosis: steroid hyperglycemia and diabetes mellitus type 2, A1c at 6.6 from 01/2024  Goal -180  BG range last 24hrs 113-174  Plan:   On insulin gtt        ID:   Diagnosis: Recurrent bacterial pneumonia  Patient has completed multiple treatment courses of remdesivir throughout hospitalization in addition to 7 days of ceftriaxone.   BAL cultures in 2/2024 positive for MDR Pseudomonas and stenotrophomonas treated with 10d course of Levaquin  CT C/A/P 3/10 with persistent groundglass opacities and changes suggestive of sequelae of COVID, prior infections.  Completed 10d course of cefepime for broad spectrum coveragge (completed (3/13)  Repeat BAL cultures from 3/11 showing 4+ growth Stenotrophomonas maltophilia. Could be colonization given prior BAL growth of same, however due to recent intubation with prolonged corticosteroid theraphy, will begin treating as true pathogen. Patient otherwise remains afebrile, no leukocytosis. CRP with down trending.   Plan:  Bactrim 2 DS tabs BID crushed via NGT, plan to treat for 10d course through 3/23  Follow up BAL cultures and sensitivities        MSK/Skin:   Diagnosis: Midline incision wound with poor wound healing  General surgery performed staple removal of the patient's midline incision on 3/12 with some skin signs  appreciated at the inferior aspect of the wound without obvious pus drainage. Overnight 3/13, wound dehiscence progressed requiring general surgery reevaluation. Red/brown aspirate noted, fascia intact. Wet-to-dry dressings in place. General surgery following for next Epson wound care.   Suspect poor wound healing in setting of high-dose steroid therapy.  No  exam no obvious signs of infection at this time.    S/P wound vac placement 3/13 as per gen surgery. No active concerns for cellulitis.   Plan:   Routine monitoring, wound care as per general surgery.     Diagnosis: Critical illness myopathy  Likely exacerbated by high-dose steroid therapy  Plan:  Continue IV Solu-Medrol as above     Disposition: Critical care    ICU Core Measures     Vented Patient  VAP Bundle  VAP bundle ordered     A: Assess, Prevent, and Manage Pain Has pain been assessed? Yes  Need for changes to pain regimen? No   B: Both Spontaneous Awakening Trials (SATs) and Spontaneous Breathing Trials (SBTs) Plan to perform spontaneous awakening trial today? Yes   Plan to perform spontaneous breathing trial today? N/A   Obvious barriers to extubation? No   C: Choice of Sedation RASS Goal: 0 Alert and Calm  Need for changes to sedation or analgesia regimen? No   D: Delirium CAM-ICU: Positive   E: Early Mobility  Plan for early mobility? Yes   F: Family Engagement Plan for family engagement today? Yes       Antibiotic Review: Patient on appropriate coverage based on culture data.  and Infectious disease consulted    Review of Invasive Devices:    Diana Plan: Continue for accurate I/O monitoring for 48 hours    Amelia Plan: Keep arterial line for hemodynamic monitoring    Prophylaxis:  VTE VTE covered by:  heparin (porcine), Subcutaneous, 5,000 Units at 03/17/24 0610       Stress Ulcer  not ordered        Significant 24hr Events     24hr events: Patient with improving mental status though is still not fully following commands. Remains off sedation,  tolerated PSV during the day and PCV/AC overnight for comfort after brief apneic episode. Switched back to PSV overnight and tolerated well. Working with PT/OT.      Subjective     Review of Systems   Unable to perform ROS: Acuity of condition        Objective                            Vitals I/O      Most Recent Min/Max in 24hrs   Temp 98.1 °F (36.7 °C) (Simultaneous filing. User may be unaware of other data.) Temp  Min: 97.5 °F (36.4 °C)  Max: 98.3 °F (36.8 °C)   Pulse (!) 130 Pulse  Min: 108  Max: 134   Resp (!) 27 Resp  Min: 12  Max: 38   /79 BP  Min: 103/57  Max: 144/75   O2 Sat 92 % SpO2  Min: 92 %  Max: 98 %      Intake/Output Summary (Last 24 hours) at 3/17/2024 0711  Last data filed at 3/17/2024 0600  Gross per 24 hour   Intake 2875.29 ml   Output 1945 ml   Net 930.29 ml       Diet Enteral/Parenteral; Tube Feeding No Oral Diet; Vital 1.5; Continuous; 45; Prosource Protein Liquid - One Packet; OD; 300; Water; Every 6 hours    Invasive Monitoring           Physical Exam   Physical Exam  Eyes:      Pupils: Pupils are equal, round, and reactive to light.   Skin:     General: Skin is warm.   HENT:      Nose: Rhinorrhea present.      Mouth/Throat:      Mouth: Mucous membranes are moist.   Neck:      Trachea: Tracheostomy present.   Cardiovascular:      Rate and Rhythm: Regular rhythm. Tachycardia present.   Musculoskeletal:      Right lower leg: No edema.      Left lower leg: No edema.   Abdominal: General: An ostomy site is present. There is ostomy site.     Palpations: Abdomen is soft.      Tenderness: There is no abdominal tenderness.      Comments: Wound vac in place - 200 cc output red/black clots  Ostomy bag with red/black stool output     Constitutional:       Appearance: She is ill-appearing.   Pulmonary:      Effort: Pulmonary effort is normal. No accessory muscle usage, respiratory distress or accessory muscle usage.      Breath sounds: Normal breath sounds.   Neurological:      Mental Status:  She is disoriented to place.      Comments: Not following simple commands     Genitourinary/Anorectal:  Ortiz present.          Diagnostic Studies      EKG: N/A  Imaging: no new imaging       Medications:  Scheduled PRN   chlorhexidine, 15 mL, Q12H SARTHAK  heparin (porcine), 5,000 Units, Q8H SARTHAK  ipratropium, 0.5 mg, TID  lamoTRIgine, 150 mg, BID  levalbuterol, 1.25 mg, TID  methylPREDNISolone sodium succinate, 40 mg, Q6H SARTHAK   Followed by  [START ON 3/20/2024] methylPREDNISolone sodium succinate, 30 mg, Q6H SARTHAK   Followed by  [START ON 3/23/2024] methylPREDNISolone sodium succinate, 20 mg, Q6H SARTHAK   Followed by  [START ON 3/26/2024] methylPREDNISolone sodium succinate, 10 mg, Q6H SARTHAK   Followed by  [START ON 3/29/2024] methylPREDNISolone sodium succinate, 10 mg, Q8H SARTHAK   Followed by  [START ON 4/1/2024] methylPREDNISolone sodium succinate, 10 mg, Q12H SARTHAK   Followed by  [START ON 4/8/2024] methylPREDNISolone sodium succinate, 10 mg, Daily  metoprolol tartrate, 12.5 mg, Q12H SARTHAK  nystatin, , BID  polyethylene glycol, 17 g, Daily  sodium chloride, 2 spray, Q4H  sodium chloride, 4 mL, TID  sulfamethoxazole-trimethoprim, 2 tablet, Q12H SARTHAK  topiramate, 50 mg, BID  venlafaxine, 37.5 mg, Daily      acetaminophen, 650 mg, Q6H PRN  fentaNYL, 50 mcg, Q1H PRN  LORazepam, 0.5 mg, Q6H PRN  OLANZapine, 10 mg, HS PRN  ondansetron, 4 mg, Q6H PRN  oxyCODONE, 2.5 mg, Q4H PRN   Or  oxyCODONE, 5 mg, Q4H PRN  sodium chloride, 1 Application, Q1H PRN       Continuous    insulin regular (HumuLIN R,NovoLIN R) 1 Units/mL in sodium chloride 0.9 % 100 mL infusion, 0.3-21 Units/hr, Last Rate: 3 Units/hr (03/17/24 0605)  norepinephrine, 1-30 mcg/min, Last Rate: Stopped (03/15/24 0525)         Labs:    CBC    Recent Labs     03/16/24  0611 03/17/24  0451   WBC 9.47 11.78*   HGB 7.4* 6.8*   HCT 22.2* 21.5*   * 124*     BMP    Recent Labs     03/16/24  0611 03/17/24  0451   SODIUM 137 137   K 3.9 4.3    105   CO2 18* 19*   AGAP 12 13    BUN 65* 71*   CREATININE 1.65* 1.65*   CALCIUM 8.2* 8.2*       Coags    No recent results     Additional Electrolytes  Recent Labs     03/15/24  1700 03/16/24  0611 03/17/24  0451   MG  --  2.1 2.2   PHOS  --  3.6 3.6   CAIONIZED 1.14  --   --           Blood Gas    No recent results  No recent results LFTs  No recent results    Infectious  No recent results  Glucose  Recent Labs     03/16/24  0611 03/17/24  0451   GLUC 110 143*               Joe Lazcano DO

## 2024-03-17 NOTE — PLAN OF CARE
Problem: Prexisting or High Potential for Compromised Skin Integrity  Goal: Skin integrity is maintained or improved  Description: INTERVENTIONS:  - Identify patients at risk for skin breakdown  - Assess and monitor skin integrity  - Assess and monitor nutrition and hydration status  - Monitor labs   - Assess for incontinence   - Turn and reposition patient  - Assist with mobility/ambulation  - Relieve pressure over bony prominences  - Avoid friction and shearing  - Provide appropriate hygiene as needed including keeping skin clean and dry  - Evaluate need for skin moisturizer/barrier cream  - Collaborate with interdisciplinary team   - Patient/family teaching  - Consider wound care consult   Outcome: Progressing     Problem: Nutrition/Hydration-ADULT  Goal: Nutrient/Hydration intake appropriate for improving, restoring or maintaining nutritional needs  Description: Monitor and assess patient's nutrition/hydration status for malnutrition. Collaborate with interdisciplinary team and initiate plan and interventions as ordered.  Monitor patient's weight and dietary intake as ordered or per policy. Utilize nutrition screening tool and intervene as necessary. Determine patient's food preferences and provide high-protein, high-caloric foods as appropriate.     INTERVENTIONS:  - Monitor oral intake, urinary output, labs, and treatment plans  - Assess nutrition and hydration status and recommend course of action  - Evaluate amount of meals eaten  - Assist patient with eating if necessary   - Allow adequate time for meals  - Recommend/ encourage appropriate diets, oral nutritional supplements, and vitamin/mineral supplements  - Order, calculate, and assess calorie counts as needed  - Recommend, monitor, and adjust tube feedings and TPN/PPN based on assessed needs  - Assess need for intravenous fluids  - Provide specific nutrition/hydration education as appropriate  - Include patient/family/caregiver in decisions related to  nutrition  Outcome: Progressing     Problem: INFECTION - ADULT  Goal: Absence or prevention of progression during hospitalization  Description: INTERVENTIONS:  - Assess and monitor for signs and symptoms of infection  - Monitor lab/diagnostic results  - Monitor all insertion sites, i.e. indwelling lines, tubes, and drains  - Monitor endotracheal if appropriate and nasal secretions for changes in amount and color  - [place] appropriate cooling/warming therapies per order  - Administer medications as ordered  - Instruct and encourage patient and family to use good hand hygiene technique  - Identify and instruct in appropriate isolation precautions for identified infection/condition  Outcome: Progressing     Problem: SAFETY ADULT  Goal: Patient will remain free of falls  Description: INTERVENTIONS:  - Educate patient/family on patient safety including physical limitations  - Instruct patient to call for assistance with activity   - Consult OT/PT to assist with strengthening/mobility   - Keep Call bell within reach  - Keep bed low and locked with side rails adjusted as appropriate  - Keep care items and personal belongings within reach  - Initiate and maintain comfort rounds  - Make Fall Risk Sign visible to staff  - Offer Toileting every 2 Hours, in advance of need  - Initiate/Maintain bed alarm  - Obtain necessary fall risk management equipment: non skid footwear  - Apply yellow socks and bracelet for high fall risk patients  - Consider moving patient to room near nurses station  Outcome: Progressing     Problem: RESPIRATORY - ADULT  Goal: Achieves optimal ventilation and oxygenation  Description: INTERVENTIONS:  - Assess for changes in respiratory status  - Assess for changes in mentation and behavior  - Position to facilitate oxygenation and minimize respiratory effort  - Oxygen administered by appropriate delivery if ordered  - Initiate smoking cessation education as indicated  - Encourage broncho-pulmonary  hygiene including cough, deep breathe, Incentive Spirometry  - Assess the need for suctioning and aspirate as needed  - Assess and instruct to report SOB or any respiratory difficulty  - Respiratory Therapy support as indicated  Outcome: Progressing     Problem: SKIN/TISSUE INTEGRITY - ADULT  Goal: Skin Integrity remains intact(Skin Breakdown Prevention)  Description: Assess:  -Perform Flavio assessment every shift   -Clean and moisturize skin every day   -Inspect skin when repositioning, toileting, and assisting with ADLS  -Assess under medical devices such as ronny  every hour   -Assess extremities for adequate circulation and sensation     Bed Management:  -Have minimal linens on bed & keep smooth, unwrinkled  -Change linens as needed when moist or perspiring  -Avoid sitting or lying in one position for more than 2 hours while in bed  -Keep HOB at 45 degrees     Toileting:  -Offer bedside commode  -Assess for incontinence every hour  -Use incontinent care products after each incontinent episode such as moisture barrier cream     Activity:  -Mobilize patient 3 times a day  -Encourage activity and walks on unit  -Encourage or provide ROM exercises   -Turn and reposition patient every 2 Hours  -Use appropriate equipment to lift or move patient in bed  -Instruct/ Assist with weight shifting every hour when out of bed in chair  -Consider limitation of chair time 2 hour intervals    Skin Care:  -Avoid use of baby powder, tape, friction and shearing, hot water or constrictive clothing  -Relieve pressure over bony prominences using allevyn   -Do not massage red bony areas    Next Steps:  -Teach patient strategies to minimize risks such as weight shifting    -Consider consults to  interdisciplinary teams such as PT  Outcome: Progressing  Goal: Incision(s), wounds(s) or drain site(s) healing without S/S of infection  Description: INTERVENTIONS  - Assess and document dressing, incision, wound bed, drain sites and  surrounding tissue  - Provide patient and family education  - Perform skin care/dressing changes every 12 hr  Outcome: Progressing  Goal: Pressure injury heals and does not worsen  Description: Interventions:  - Implement low air loss mattress or specialty surface (Criteria met)  - Apply silicone foam dressing  - Instruct/assist with weight shifting every 240 minutes when in chair   - Limit chair time to 4 hour intervals  - Use special pressure reducing interventions such as wedges when in chair   - Apply fecal or urinary incontinence containment device   - Perform passive or active ROM every 4 hr  - Turn and reposition patient & offload bony prominences every 2 hours   - Utilize friction reducing device or surface for transfers   - Consider consults to  interdisciplinary teams such as pt/ot  - Use incontinent care products after each incontinent episode such as incontinence foam  - Consider nutrition services referral as needed  Outcome: Progressing     Problem: NEUROSENSORY - ADULT  Goal: Achieves stable or improved neurological status  Description: INTERVENTIONS  - Monitor and report changes in neurological status  - Monitor vital signs such as temperature, blood pressure, glucose, and any other labs ordered   - Initiate measures to prevent increased intracranial pressure  - Monitor for seizure activity and implement precautions if appropriate      Outcome: Progressing  Goal: Achieves maximal functionality and self care  Description: INTERVENTIONS  - Monitor swallowing and airway patency with patient fatigue and changes in neurological status  - Encourage and assist patient to increase activity and self care.   - Encourage visually impaired, hearing impaired and aphasic patients to use assistive/communication devices  Outcome: Progressing     Problem: CARDIOVASCULAR - ADULT  Goal: Maintains optimal cardiac output and hemodynamic stability  Description: INTERVENTIONS:  - Monitor I/O, vital signs and rhythm  -  Monitor for S/S and trends of decreased cardiac output  - Administer and titrate ordered vasoactive medications to optimize hemodynamic stability  - Assess quality of pulses, skin color and temperature  - Assess for signs of decreased coronary artery perfusion  - Instruct patient to report change in severity of symptoms  Outcome: Progressing  Goal: Absence of cardiac dysrhythmias or at baseline rhythm  Description: INTERVENTIONS:  - Continuous cardiac monitoring, vital signs, obtain 12 lead EKG if ordered  - Administer antiarrhythmic and heart rate control medications as ordered  - Monitor electrolytes and administer replacement therapy as ordered  Outcome: Progressing     Problem: GASTROINTESTINAL - ADULT  Goal: Minimal or absence of nausea and/or vomiting  Description: INTERVENTIONS:  - Administer IV fluids if ordered to ensure adequate hydration  - Maintain NPO status until nausea and vomiting are resolved  - Nasogastric tube if ordered  - Administer ordered antiemetic medications as needed  - Provide nonpharmacologic comfort measures as appropriate  - Advance diet as tolerated, if ordered  - Consider nutrition services referral to assist patient with adequate nutrition and appropriate food choices  Outcome: Progressing     Problem: GASTROINTESTINAL - ADULT  Goal: Minimal or absence of nausea and/or vomiting  Description: INTERVENTIONS:  - Administer IV fluids if ordered to ensure adequate hydration  - Maintain NPO status until nausea and vomiting are resolved  - Nasogastric tube if ordered  - Administer ordered antiemetic medications as needed  - Provide nonpharmacologic comfort measures as appropriate  - Advance diet as tolerated, if ordered  - Consider nutrition services referral to assist patient with adequate nutrition and appropriate food choices  Outcome: Progressing  Goal: Maintains or returns to baseline bowel function  Description: INTERVENTIONS:  - Assess bowel function  - Encourage oral fluids to  ensure adequate hydration  - Administer IV fluids if ordered to ensure adequate hydration  - Administer ordered medications as needed  - Encourage mobilization and activity  - Consider nutritional services referral to assist patient with adequate nutrition and appropriate food choices  Outcome: Progressing  Goal: Maintains adequate nutritional intake  Description: INTERVENTIONS:  - Monitor percentage of each meal consumed  - Identify factors contributing to decreased intake, treat as appropriate  - Assist with meals as needed  - Monitor I&O, weight, and lab values if indicated  - Obtain nutrition services referral as needed  Outcome: Progressing  Goal: Establish and maintain optimal ostomy function  Description: INTERVENTIONS:  - Assess bowel function  - Encourage oral fluids to ensure adequate hydration  - Administer IV fluids if ordered to ensure adequate hydration   - Administer ordered medications as needed  - Encourage mobilization and activity  - Nutrition services referral to assist patient with appropriate food choices  - Assess stoma site  - Consider wound care consult   Outcome: Progressing  Goal: Oral mucous membranes remain intact  Description: INTERVENTIONS  - Assess oral mucosa and hygiene practices  - Implement preventative oral hygiene regimen  - Implement oral medicated treatments as ordered  - Initiate Nutrition services referral as needed  Outcome: Progressing     Problem: GENITOURINARY - ADULT  Goal: Maintains or returns to baseline urinary function  Description: INTERVENTIONS:  - Assess urinary function  - Encourage oral fluids to ensure adequate hydration if ordered  - Administer IV fluids as ordered to ensure adequate hydration  - Administer ordered medications as needed  - Offer frequent toileting  - Follow urinary retention protocol if ordered  Outcome: Progressing  Goal: Urinary catheter remains patent  Description: INTERVENTIONS:  - Assess patency of urinary catheter  - If patient has a  chronic marie, consider changing catheter if non-functioning  - Follow guidelines for intermittent irrigation of non-functioning urinary catheter  Outcome: Progressing     Problem: METABOLIC, FLUID AND ELECTROLYTES - ADULT  Goal: Electrolytes maintained within normal limits  Description: INTERVENTIONS:  - Monitor labs and assess patient for signs and symptoms of electrolyte imbalances  - Administer electrolyte replacement as ordered  - Monitor response to electrolyte replacements, including repeat lab results as appropriate  - Instruct patient on fluid and nutrition as appropriate  Outcome: Progressing  Goal: Fluid balance maintained  Description: INTERVENTIONS:  - Monitor labs   - Monitor I/O and WT  - Instruct patient on fluid and nutrition as appropriate  - Assess for signs & symptoms of volume excess or deficit  Outcome: Progressing  Goal: Glucose maintained within target range  Description: INTERVENTIONS:  - Monitor Blood Glucose as ordered  - Assess for signs and symptoms of hyperglycemia and hypoglycemia  - Administer ordered medications to maintain glucose within target range  - Assess nutritional intake and initiate nutrition service referral as needed  Outcome: Progressing     Problem: HEMATOLOGIC - ADULT  Goal: Maintains hematologic stability  Description: INTERVENTIONS  - Assess for signs and symptoms of bleeding or hemorrhage  - Monitor labs  - Administer supportive blood products/factors as ordered and appropriate  Outcome: Progressing     Problem: MUSCULOSKELETAL - ADULT  Goal: Maintain or return mobility to safest level of function  Description: INTERVENTIONS:  - Assess patient's ability to carry out ADLs; assess patient's baseline for ADL function and identify physical deficits which impact ability to perform ADLs (bathing, care of mouth/teeth, toileting, grooming, dressing, etc.)  - Assess/evaluate cause of self-care deficits   - Assess range of motion  - Assess patient's mobility  - Assess  patient's need for assistive devices and provide as appropriate  - Encourage maximum independence but intervene and supervise when necessary  - Involve family in performance of ADLs  - Assess for home care needs following discharge   - Consider OT consult to assist with ADL evaluation and planning for discharge  - Provide patient education as appropriate  Outcome: Progressing     Problem: COPING  Goal: Pt/Family able to verbalize concerns and demonstrate effective coping strategies  Description: INTERVENTIONS:  - Assist patient/family to identify coping skills, available support systems and cultural and spiritual values  - Provide emotional support, including active listening and acknowledgement of concerns of patient and caregivers  - Reduce environmental stimuli, as able  - Provide patient education  - Assess for spiritual pain/suffering and initiate spiritual care, including notification of Pastoral Care or nat[place] based community as needed  - Assess effectiveness of coping strategies  Outcome: Progressing  Goal: Will report anxiety at manageable levels  Description: INTERVENTIONS:  - Administer medication as ordered  - Teach and encourage coping skills  - Provide emotional support  - Assess patient/family for anxiety and ability to cope  Outcome: Progressing     Problem: BEHAVIOR  Goal: Pt/Family maintain appropriate behavior and adhere to behavioral management agreement, if implemented  Description: INTERVENTIONS:  - Assess the family dynamic   - Encourage verbalization of thoughts and concerns in a socially appropriate manner  - Assess patient/family's coping skills and non-compliant behavior (including use of illegal substances).  - Utilize positive, consistent limit setting strategies supporting safety of patient, staff and others  - Initiate consult with Case Management, Spiritual Care or other ancillary services as appropriate  - If a patient's/visitor's behavior jeopardizes the safety of the patient, staff,  or others, refer to organization procedure.   - Notify Security of behavior or suspected illegal substances which indicate the need for search of the patient and/or belongings  - Encourage participation in the decision making process about a behavioral management agreement; implement if patient meets criteria  Outcome: Progressing     Problem: Potential for Falls  Goal: Patient will remain free of falls  Description: INTERVENTIONS:  - Educate patient/family on patient safety including physical limitations  - Instruct patient to call for assistance with activity   - Consult OT/PT to assist with strengthening/mobility   - Keep Call bell within reach  - Keep bed low and locked with side rails adjusted as appropriate  - Keep care items and personal belongings within reach  - Initiate and maintain comfort rounds  - Make Fall Risk Sign visible to staff  - Offer Toileting every 2 Hours, in advance of need  - Initiate/Maintain bed alarm  - Obtain necessary fall risk management equipment: non skid footwear  - Apply yellow socks and bracelet for high fall risk patients  - Consider moving patient to room near nurses station  Outcome: Progressing

## 2024-03-18 LAB
ABO GROUP BLD: NORMAL
AMMONIA PLAS-SCNC: 51 UMOL/L (ref 18–72)
ANION GAP SERPL CALCULATED.3IONS-SCNC: 10 MMOL/L (ref 4–13)
ANISOCYTOSIS BLD QL SMEAR: PRESENT
BASOPHILS # BLD MANUAL: 0 THOUSAND/UL (ref 0–0.1)
BASOPHILS NFR MAR MANUAL: 0 % (ref 0–1)
BLD GP AB SCN SERPL QL: NEGATIVE
BUN SERPL-MCNC: 81 MG/DL (ref 5–25)
CA-I BLD-SCNC: 1.2 MMOL/L (ref 1.12–1.32)
CALCIUM SERPL-MCNC: 8 MG/DL (ref 8.4–10.2)
CHLORIDE SERPL-SCNC: 107 MMOL/L (ref 96–108)
CO2 SERPL-SCNC: 18 MMOL/L (ref 21–32)
CREAT SERPL-MCNC: 1.89 MG/DL (ref 0.6–1.3)
CRP SERPL QL: 38.2 MG/L
DACRYOCYTES BLD QL SMEAR: PRESENT
EOSINOPHIL # BLD MANUAL: 0 THOUSAND/UL (ref 0–0.4)
EOSINOPHIL NFR BLD MANUAL: 0 % (ref 0–6)
ERYTHROCYTE [DISTWIDTH] IN BLOOD BY AUTOMATED COUNT: 21.5 % (ref 11.6–15.1)
ERYTHROCYTE [DISTWIDTH] IN BLOOD BY AUTOMATED COUNT: 21.5 % (ref 11.6–15.1)
FOLATE SERPL-MCNC: >22.3 NG/ML
FUNGUS SPEC CULT: ABNORMAL
GFR SERPL CREATININE-BSD FRML MDRD: 28 ML/MIN/1.73SQ M
GLUCOSE SERPL-MCNC: 126 MG/DL (ref 65–140)
GLUCOSE SERPL-MCNC: 136 MG/DL (ref 65–140)
GLUCOSE SERPL-MCNC: 138 MG/DL (ref 65–140)
GLUCOSE SERPL-MCNC: 139 MG/DL (ref 65–140)
GLUCOSE SERPL-MCNC: 144 MG/DL (ref 65–140)
GLUCOSE SERPL-MCNC: 158 MG/DL (ref 65–140)
GLUCOSE SERPL-MCNC: 165 MG/DL (ref 65–140)
GLUCOSE SERPL-MCNC: 170 MG/DL (ref 65–140)
GLUCOSE SERPL-MCNC: 177 MG/DL (ref 65–140)
GLUCOSE SERPL-MCNC: 179 MG/DL (ref 65–140)
GLUCOSE SERPL-MCNC: 182 MG/DL (ref 65–140)
GLUCOSE SERPL-MCNC: 189 MG/DL (ref 65–140)
GLUCOSE SERPL-MCNC: 206 MG/DL (ref 65–140)
HCT VFR BLD AUTO: 16.3 % (ref 34.8–46.1)
HCT VFR BLD AUTO: 17.6 % (ref 34.8–46.1)
HCT VFR BLD AUTO: 17.6 % (ref 34.8–46.1)
HCT VFR BLD AUTO: 27 % (ref 34.8–46.1)
HCT VFR BLD AUTO: 29.4 % (ref 34.8–46.1)
HGB BLD-MCNC: 5.3 G/DL (ref 11.5–15.4)
HGB BLD-MCNC: 5.6 G/DL (ref 11.5–15.4)
HGB BLD-MCNC: 5.6 G/DL (ref 11.5–15.4)
HGB BLD-MCNC: 9.3 G/DL (ref 11.5–15.4)
HGB BLD-MCNC: 9.9 G/DL (ref 11.5–15.4)
HGB RETIC QN AUTO: 42.3 PG (ref 30–38.3)
IGA SERPL-MCNC: 42 MG/DL (ref 66–433)
IGG SERPL-MCNC: 180 MG/DL (ref 635–1741)
IGM SERPL-MCNC: <20 MG/DL (ref 45–281)
IMM RETICS NFR: 40.5 % (ref 0–14)
LDH SERPL-CCNC: 416 U/L (ref 140–271)
LYMPHOCYTES # BLD AUTO: 0 % (ref 14–44)
LYMPHOCYTES # BLD AUTO: 0 THOUSAND/UL (ref 0.6–4.47)
MAGNESIUM SERPL-MCNC: 2.3 MG/DL (ref 1.9–2.7)
MCH RBC QN AUTO: 31.1 PG (ref 26.8–34.3)
MCH RBC QN AUTO: 31.1 PG (ref 26.8–34.3)
MCHC RBC AUTO-ENTMCNC: 32.2 G/DL (ref 31.4–37.4)
MCHC RBC AUTO-ENTMCNC: 32.2 G/DL (ref 31.4–37.4)
MCV RBC AUTO: 97 FL (ref 82–98)
MCV RBC AUTO: 97 FL (ref 82–98)
MONOCYTES # BLD AUTO: 0 THOUSAND/UL (ref 0–1.22)
MONOCYTES NFR BLD: 0 % (ref 4–12)
NEUTROPHILS # BLD MANUAL: 8.39 THOUSAND/UL (ref 1.85–7.62)
NEUTS BAND NFR BLD MANUAL: 5 % (ref 0–8)
NEUTS SEG NFR BLD AUTO: 95 % (ref 43–75)
NRBC BLD AUTO-RTO: 4 /100 WBC (ref 0–2)
OVALOCYTES BLD QL SMEAR: PRESENT
PHOSPHATE SERPL-MCNC: 4 MG/DL (ref 2.7–4.5)
PLATELET # BLD AUTO: 89 THOUSANDS/UL (ref 149–390)
PLATELET # BLD AUTO: 89 THOUSANDS/UL (ref 149–390)
PLATELET BLD QL SMEAR: ABNORMAL
PMV BLD AUTO: 12.6 FL (ref 8.9–12.7)
PMV BLD AUTO: 12.6 FL (ref 8.9–12.7)
POIKILOCYTOSIS BLD QL SMEAR: PRESENT
POTASSIUM SERPL-SCNC: 4.1 MMOL/L (ref 3.5–5.3)
RBC # BLD AUTO: 1.8 MILLION/UL (ref 3.81–5.12)
RBC # BLD AUTO: 1.8 MILLION/UL (ref 3.81–5.12)
RBC MORPH BLD: PRESENT
RETICS # AUTO: ABNORMAL 10*3/UL (ref 14097–95744)
RETICS # CALC: 4.9 % (ref 0.37–1.87)
RH BLD: NEGATIVE
SODIUM SERPL-SCNC: 135 MMOL/L (ref 135–147)
SPECIMEN EXPIRATION DATE: NORMAL
WBC # BLD AUTO: 8.39 THOUSAND/UL (ref 4.31–10.16)
WBC # BLD AUTO: 8.39 THOUSAND/UL (ref 4.31–10.16)

## 2024-03-18 PROCEDURE — 93005 ELECTROCARDIOGRAM TRACING: CPT

## 2024-03-18 PROCEDURE — 86900 BLOOD TYPING SEROLOGIC ABO: CPT

## 2024-03-18 PROCEDURE — 80048 BASIC METABOLIC PNL TOTAL CA: CPT | Performed by: STUDENT IN AN ORGANIZED HEALTH CARE EDUCATION/TRAINING PROGRAM

## 2024-03-18 PROCEDURE — 99255 IP/OBS CONSLTJ NEW/EST HI 80: CPT | Performed by: INTERNAL MEDICINE

## 2024-03-18 PROCEDURE — 94003 VENT MGMT INPAT SUBQ DAY: CPT

## 2024-03-18 PROCEDURE — 86923 COMPATIBILITY TEST ELECTRIC: CPT

## 2024-03-18 PROCEDURE — NC001 PR NO CHARGE: Performed by: INTERNAL MEDICINE

## 2024-03-18 PROCEDURE — 85014 HEMATOCRIT: CPT | Performed by: STUDENT IN AN ORGANIZED HEALTH CARE EDUCATION/TRAINING PROGRAM

## 2024-03-18 PROCEDURE — 85014 HEMATOCRIT: CPT

## 2024-03-18 PROCEDURE — 99232 SBSQ HOSP IP/OBS MODERATE 35: CPT | Performed by: PHYSICIAN ASSISTANT

## 2024-03-18 PROCEDURE — 97606 NEG PRS WND THER DME>50 SQCM: CPT | Performed by: PHYSICIAN ASSISTANT

## 2024-03-18 PROCEDURE — 93308 TTE F-UP OR LMTD: CPT | Performed by: INTERNAL MEDICINE

## 2024-03-18 PROCEDURE — 82330 ASSAY OF CALCIUM: CPT

## 2024-03-18 PROCEDURE — 85046 RETICYTE/HGB CONCENTRATE: CPT

## 2024-03-18 PROCEDURE — 2W03X6Z CHANGE PRESSURE DRESSING ON ABDOMINAL WALL: ICD-10-PCS | Performed by: SURGERY

## 2024-03-18 PROCEDURE — 94640 AIRWAY INHALATION TREATMENT: CPT | Performed by: SOCIAL WORKER

## 2024-03-18 PROCEDURE — 83735 ASSAY OF MAGNESIUM: CPT | Performed by: STUDENT IN AN ORGANIZED HEALTH CARE EDUCATION/TRAINING PROGRAM

## 2024-03-18 PROCEDURE — 94664 DEMO&/EVAL PT USE INHALER: CPT | Performed by: SOCIAL WORKER

## 2024-03-18 PROCEDURE — 94760 N-INVAS EAR/PLS OXIMETRY 1: CPT | Performed by: SOCIAL WORKER

## 2024-03-18 PROCEDURE — 82948 REAGENT STRIP/BLOOD GLUCOSE: CPT

## 2024-03-18 PROCEDURE — 85018 HEMOGLOBIN: CPT | Performed by: STUDENT IN AN ORGANIZED HEALTH CARE EDUCATION/TRAINING PROGRAM

## 2024-03-18 PROCEDURE — 83615 LACTATE (LD) (LDH) ENZYME: CPT

## 2024-03-18 PROCEDURE — 94640 AIRWAY INHALATION TREATMENT: CPT

## 2024-03-18 PROCEDURE — 83010 ASSAY OF HAPTOGLOBIN QUANT: CPT

## 2024-03-18 PROCEDURE — 82397 CHEMILUMINESCENT ASSAY: CPT

## 2024-03-18 PROCEDURE — 86901 BLOOD TYPING SEROLOGIC RH(D): CPT

## 2024-03-18 PROCEDURE — 85018 HEMOGLOBIN: CPT

## 2024-03-18 PROCEDURE — 99233 SBSQ HOSP IP/OBS HIGH 50: CPT | Performed by: STUDENT IN AN ORGANIZED HEALTH CARE EDUCATION/TRAINING PROGRAM

## 2024-03-18 PROCEDURE — 94760 N-INVAS EAR/PLS OXIMETRY 1: CPT

## 2024-03-18 PROCEDURE — 86850 RBC ANTIBODY SCREEN: CPT

## 2024-03-18 PROCEDURE — 82784 ASSAY IGA/IGD/IGG/IGM EACH: CPT

## 2024-03-18 PROCEDURE — 86140 C-REACTIVE PROTEIN: CPT | Performed by: STUDENT IN AN ORGANIZED HEALTH CARE EDUCATION/TRAINING PROGRAM

## 2024-03-18 PROCEDURE — 85027 COMPLETE CBC AUTOMATED: CPT | Performed by: STUDENT IN AN ORGANIZED HEALTH CARE EDUCATION/TRAINING PROGRAM

## 2024-03-18 PROCEDURE — P9040 RBC LEUKOREDUCED IRRADIATED: HCPCS

## 2024-03-18 PROCEDURE — 87449 NOS EACH ORGANISM AG IA: CPT

## 2024-03-18 PROCEDURE — 80299 QUANTITATIVE ASSAY DRUG: CPT

## 2024-03-18 PROCEDURE — 85007 BL SMEAR W/DIFF WBC COUNT: CPT

## 2024-03-18 PROCEDURE — 84100 ASSAY OF PHOSPHORUS: CPT

## 2024-03-18 PROCEDURE — 82746 ASSAY OF FOLIC ACID SERUM: CPT

## 2024-03-18 PROCEDURE — 82140 ASSAY OF AMMONIA: CPT

## 2024-03-18 RX ORDER — VENLAFAXINE 25 MG/1
12.5 TABLET ORAL
Status: DISCONTINUED | OUTPATIENT
Start: 2024-03-19 | End: 2024-04-02

## 2024-03-18 RX ORDER — MINOCYCLINE HYDROCHLORIDE 100 MG/1
100 CAPSULE ORAL EVERY 12 HOURS SCHEDULED
Status: DISCONTINUED | OUTPATIENT
Start: 2024-03-18 | End: 2024-03-19

## 2024-03-18 RX ORDER — MODAFINIL 100 MG/1
100 TABLET ORAL DAILY
Status: DISCONTINUED | OUTPATIENT
Start: 2024-03-19 | End: 2024-03-25

## 2024-03-18 RX ORDER — SODIUM CHLORIDE, SODIUM GLUCONATE, SODIUM ACETATE, POTASSIUM CHLORIDE, MAGNESIUM CHLORIDE, SODIUM PHOSPHATE, DIBASIC, AND POTASSIUM PHOSPHATE .53; .5; .37; .037; .03; .012; .00082 G/100ML; G/100ML; G/100ML; G/100ML; G/100ML; G/100ML; G/100ML
500 INJECTION, SOLUTION INTRAVENOUS ONCE
Status: COMPLETED | OUTPATIENT
Start: 2024-03-18 | End: 2024-03-18

## 2024-03-18 RX ADMIN — CHLORHEXIDINE GLUCONATE 0.12% ORAL RINSE 15 ML: 1.2 LIQUID ORAL at 21:57

## 2024-03-18 RX ADMIN — SODIUM CHLORIDE SOLN NEBU 3% 4 ML: 3 NEBU SOLN at 07:50

## 2024-03-18 RX ADMIN — Medication 2 SPRAY: at 21:57

## 2024-03-18 RX ADMIN — CHLORHEXIDINE GLUCONATE 0.12% ORAL RINSE 15 ML: 1.2 LIQUID ORAL at 08:34

## 2024-03-18 RX ADMIN — NYSTATIN: 100000 POWDER TOPICAL at 17:14

## 2024-03-18 RX ADMIN — SULFAMETHOXAZOLE AND TRIMETHOPRIM 2 TABLET: 800; 160 TABLET ORAL at 08:35

## 2024-03-18 RX ADMIN — LAMOTRIGINE 150 MG: 100 TABLET ORAL at 08:34

## 2024-03-18 RX ADMIN — HEPARIN SODIUM 5000 UNITS: 5000 INJECTION INTRAVENOUS; SUBCUTANEOUS at 21:58

## 2024-03-18 RX ADMIN — NYSTATIN 1 APPLICATION: 100000 POWDER TOPICAL at 08:36

## 2024-03-18 RX ADMIN — Medication 2 SPRAY: at 10:00

## 2024-03-18 RX ADMIN — TOPIRAMATE 50 MG: 100 TABLET, FILM COATED ORAL at 17:13

## 2024-03-18 RX ADMIN — LORAZEPAM 0.5 MG: 2 INJECTION INTRAMUSCULAR; INTRAVENOUS at 04:02

## 2024-03-18 RX ADMIN — METHYLPREDNISOLONE SODIUM SUCCINATE 40 MG: 40 INJECTION, POWDER, FOR SOLUTION INTRAMUSCULAR; INTRAVENOUS at 05:29

## 2024-03-18 RX ADMIN — VENLAFAXINE 37.5 MG: 25 TABLET ORAL at 08:35

## 2024-03-18 RX ADMIN — Medication 2 SPRAY: at 13:42

## 2024-03-18 RX ADMIN — LEVALBUTEROL HYDROCHLORIDE 1.25 MG: 1.25 SOLUTION RESPIRATORY (INHALATION) at 19:46

## 2024-03-18 RX ADMIN — IPRATROPIUM BROMIDE 0.5 MG: 0.5 SOLUTION RESPIRATORY (INHALATION) at 19:46

## 2024-03-18 RX ADMIN — SODIUM CHLORIDE 6 UNITS/HR: 9 INJECTION, SOLUTION INTRAVENOUS at 13:28

## 2024-03-18 RX ADMIN — SODIUM CHLORIDE, SODIUM GLUCONATE, SODIUM ACETATE, POTASSIUM CHLORIDE, MAGNESIUM CHLORIDE, SODIUM PHOSPHATE, DIBASIC, AND POTASSIUM PHOSPHATE 500 ML: .53; .5; .37; .037; .03; .012; .00082 INJECTION, SOLUTION INTRAVENOUS at 17:14

## 2024-03-18 RX ADMIN — SODIUM CHLORIDE SOLN NEBU 3% 4 ML: 3 NEBU SOLN at 19:46

## 2024-03-18 RX ADMIN — POLYETHYLENE GLYCOL 3350 17 G: 17 POWDER, FOR SOLUTION ORAL at 08:34

## 2024-03-18 RX ADMIN — Medication 2 SPRAY: at 02:04

## 2024-03-18 RX ADMIN — Medication 2 SPRAY: at 17:14

## 2024-03-18 RX ADMIN — METHYLPREDNISOLONE SODIUM SUCCINATE 40 MG: 40 INJECTION, POWDER, FOR SOLUTION INTRAMUSCULAR; INTRAVENOUS at 12:28

## 2024-03-18 RX ADMIN — SODIUM CHLORIDE SOLN NEBU 3% 4 ML: 3 NEBU SOLN at 13:37

## 2024-03-18 RX ADMIN — IPRATROPIUM BROMIDE 0.5 MG: 0.5 SOLUTION RESPIRATORY (INHALATION) at 13:37

## 2024-03-18 RX ADMIN — HEPARIN SODIUM 5000 UNITS: 5000 INJECTION INTRAVENOUS; SUBCUTANEOUS at 05:29

## 2024-03-18 RX ADMIN — MINOCYCLINE HYDROCHLORIDE 100 MG: 100 CAPSULE ORAL at 23:04

## 2024-03-18 RX ADMIN — LEVALBUTEROL HYDROCHLORIDE 1.25 MG: 1.25 SOLUTION RESPIRATORY (INHALATION) at 13:37

## 2024-03-18 RX ADMIN — METHYLPREDNISOLONE SODIUM SUCCINATE 40 MG: 40 INJECTION, POWDER, FOR SOLUTION INTRAMUSCULAR; INTRAVENOUS at 00:10

## 2024-03-18 RX ADMIN — HEPARIN SODIUM 5000 UNITS: 5000 INJECTION INTRAVENOUS; SUBCUTANEOUS at 13:41

## 2024-03-18 RX ADMIN — LEVALBUTEROL HYDROCHLORIDE 1.25 MG: 1.25 SOLUTION RESPIRATORY (INHALATION) at 07:50

## 2024-03-18 RX ADMIN — METHYLPREDNISOLONE SODIUM SUCCINATE 40 MG: 40 INJECTION, POWDER, FOR SOLUTION INTRAMUSCULAR; INTRAVENOUS at 17:13

## 2024-03-18 RX ADMIN — LORAZEPAM 0.5 MG: 2 INJECTION INTRAMUSCULAR; INTRAVENOUS at 22:32

## 2024-03-18 RX ADMIN — LAMOTRIGINE 150 MG: 100 TABLET ORAL at 17:13

## 2024-03-18 RX ADMIN — TOPIRAMATE 50 MG: 100 TABLET, FILM COATED ORAL at 08:34

## 2024-03-18 RX ADMIN — IPRATROPIUM BROMIDE 0.5 MG: 0.5 SOLUTION RESPIRATORY (INHALATION) at 07:50

## 2024-03-18 RX ADMIN — Medication 2 SPRAY: at 05:30

## 2024-03-18 NOTE — CONSULTS
Consultation - Nephrology   Carla Hunt 59 y.o. female MRN: [id number]  Unit/Bed#: MICU 12 Encounter: [id number]      Assessment/Plan:  Acute kidney injury, etiology multifactorial.  Likely component due to ATN given hemodynamic fluctuations, worsening anemia, episodic ELIESER, etc.  Additional component could be due to Bactrim use with impaired creatinine secretion.  Urine chloride 35, urine sodium 40  Previous urine dipstick negative with 4-10 white cells, 1-2 red cells.  Nongap metabolic acidosis most likely secondary to renal failure and Bactrim use.  Acute on chronic anemia status post PRBC transfusion, serum and urine urine electrophoresis negative for monoclonal gammopathy  Hypoxic respiratory failure currently remains vent dependent, status post tracheostomy etiology multifactorial given COVID-19 pneumonia with superimposed bacterial component  Recent volvulus status post hemicolectomy with cecal ostomy with noted stomal retraction  B-cell lymphoma, previously on rituximab therapy.  Encephalopathy with recent subarachnoid hemorrhage and history of seizure disorder    Plan:  Creatinine has continued to rise again etiology multifactorial given likely component of ELIESER plus Bactrim use (impaired creatinine secretion)  Will continue monitor renal function closely.  Anticipating transition of Bactrim to minocycline.  Discussed with critical care, anticipating transition of Bactrim to minocycline  Proceeding with noncontrast CT.  If CT absolutely needed with IV contrast would recommend IV fluids to be given 4 to 6 hours pre and post CT. (Isolyte at 100 cc/h for 4 to 6 hours pre and post)  Discussed with critical care, agreeable to plan as noted above.    History of Present Illness   Physician Requesting Consult: Moises Bowden MD  Reason for Consult / Principal Problem: Acute kidney injury  HPI: Carla Hunt is a 59 y.o. year old female who presents with weakness.    Patient is a 59-year-old female who  initially presented with generalized weakness.  Clinical course has been significant complicated by respiratory failure requiring tracheostomy and PEG tube placement.  Noted volvulus with hemicolectomy and ostomy placement.    Recently has developed significant anemia requiring PRBC transfusion, persistent encephalopathy and hypoxic respiratory failure with COVID-19 infection and bacterial component.    History obtained from unobtainable from patient due to mental status    Review of Systems    Pertinent findings of a 10 point review of systems noted above otherwise all others negative    Historical Information   Patient Active Problem List   Diagnosis    Rosacea    Anxiety state    Disorder of parathyroid gland (HCC)    Eczema    Grand mal status (HCC)    Hypercalcemia    Hypertensive disorder    Long term current use of hormonal contraceptive    Open wound of hand except fingers    Otitis externa    Overweight    Pain in face    Skin sensation disturbance    Subjective visual disturbance    Hidradenitis suppurativa of left axilla    Other specified anxiety disorders    Reactive depression    Encephalopathy    Nephrolithiasis    COVID    Stage 3b chronic kidney disease (CKD) (HCC)    Abnormal CT of the head    Acute respiratory failure with hypoxia (HCC)    Transaminitis    Teto marginal zone B-cell lymphoma (HCC)    Seizure disorder (HCC)    Hypotension    Palliative care patient    Goals of care, counseling/discussion    Acute kidney injury (HCC)    Cecal volvulus (HCC)     Past Medical History:   Diagnosis Date    Hx of hypercalcemia     Lymphoma (HCC) 2021    Parathyroid disease (HCC)     Seizures (HCC)     Situational depression     24 yr old son  from drug overdose     Past Surgical History:   Procedure Laterality Date    CRANIOTOMY FOR TEMPORAL LOBECTOMY Left     DC LAPS ABD PRTM&OMENTUM DX W/WO SPEC BR/WA SPX N/A 2024    Procedure: LAPAROSCOPY DIAGNOSTIC,EXPLORATORY LAPAROTOMY, RIGHT KARY  COLECTOMY,ILEOSTOMY, MUCUS FISTULA;  Surgeon: Lauryn Ullrich, DO;  Location: [facility];  Service: General     Social History   Social History     Substance and Sexual Activity   Alcohol Use Not Currently     Social History     Substance and Sexual Activity   Drug Use Never     Social History     Tobacco Use   Smoking Status Never   Smokeless Tobacco Never     Family History   Problem Relation Age of Onset    Diabetes Mother     Cancer Father        Meds/Allergies   current meds:   Current Facility-Administered Medications   Medication Dose Route Frequency    acetaminophen (TYLENOL) tablet 650 mg  650 mg Oral Q6H PRN    chlorhexidine (PERIDEX) 0.12 % oral rinse 15 mL  15 mL Mouth/Throat Q12H SARTHAK    fentaNYL injection 50 mcg  50 mcg Intravenous Q1H PRN    heparin (porcine) subcutaneous injection 5,000 Units  5,000 Units Subcutaneous Q8H SARTHAK    insulin regular (HumuLIN R,NovoLIN R) 1 Units/mL in sodium chloride 0.9 % 100 mL infusion  0.3-21 Units/hr Intravenous Titrated    ipratropium (ATROVENT) 0.02 % inhalation solution 0.5 mg  0.5 mg Nebulization TID    lamoTRIgine (LaMICtal) tablet 150 mg  150 mg Oral BID    levalbuterol (XOPENEX) inhalation solution 1.25 mg  1.25 mg Nebulization TID    lidocaine (PF) (XYLOCAINE-MPF) 2 % injection 10 mL  10 mL Inhalation Once    LORazepam (ATIVAN) injection 0.5 mg  0.5 mg Intravenous Q6H PRN    methylPREDNISolone sodium succinate (Solu-MEDROL) injection 40 mg  40 mg Intravenous Q6H SARTHAK    Followed by    [START ON 3/20/2024] methylPREDNISolone sodium succinate (Solu-MEDROL) injection 30 mg  30 mg Intravenous Q6H SARTHAK    Followed by    [START ON 3/23/2024] methylPREDNISolone sodium succinate (Solu-MEDROL) injection 20 mg  20 mg Intravenous Q6H SARTHAK    Followed by    [START ON 3/26/2024] methylPREDNISolone sodium succinate (Solu-MEDROL) injection 10 mg  10 mg Intravenous Q6H SARTHAK    Followed by    [START ON 3/29/2024] methylPREDNISolone sodium succinate (Solu-MEDROL) injection 10 mg  10  "mg Intravenous Q8H SARTHAK    Followed by    [START ON 4/1/2024] methylPREDNISolone sodium succinate (Solu-MEDROL) injection 10 mg  10 mg Intravenous Q12H SARTHAK    Followed by    [START ON 4/8/2024] methylPREDNISolone sodium succinate (Solu-MEDROL) injection 10 mg  10 mg Intravenous Daily    nystatin (MYCOSTATIN) powder   Topical BID    ondansetron (ZOFRAN) injection 4 mg  4 mg Intravenous Q6H PRN    polyethylene glycol (MIRALAX) packet 17 g  17 g Per NG Tube Daily    sodium chloride (AYR SALINE NASAL) nasal gel 1 Application  1 Application Nasal Q1H PRN    sodium chloride (OCEAN) 0.65 % nasal spray 2 spray  2 spray Each Nare Q4H    sodium chloride 3 % inhalation solution 4 mL  4 mL Nebulization TID    sulfamethoxazole-trimethoprim (BACTRIM DS) 800-160 mg per tablet 2 tablet  2 tablet Per NG Tube Q12H SARTHAK    topiramate (TOPAMAX) 6 mg/ml oral suspension 50 mg  50 mg Per PEG Tube BID    [START ON 3/19/2024] venlafaxine (EFFEXOR) tablet 12.5 mg  12.5 mg Oral TID With Meals       No Known Allergies      Objective   /72   Pulse (!) 122   Temp 97.6 °F (36.4 °C)   Resp 20   Ht 5' 8\" (1.727 m)   Wt 74.3 kg (163 lb 12.8 oz)   SpO2 97%   BMI 24.91 kg/m²     Intake/Output Summary (Last 24 hours) at 3/18/2024 1408  Last data filed at 3/18/2024 1230  Gross per 24 hour   Intake 2892.12 ml   Output 1795 ml   Net 1097.12 ml       Current Weight: Weight - Scale: 74.3 kg (163 lb 12.8 oz)    Physical Exam  Constitutional:       Appearance: She is ill-appearing.   Eyes:      General: No scleral icterus.  Cardiovascular:      Rate and Rhythm: Normal rate and regular rhythm.   Pulmonary:      Effort: Pulmonary effort is normal.      Breath sounds: Rhonchi and rales present.   Abdominal:      General: There is no distension.      Palpations: Abdomen is soft.      Tenderness: There is no guarding.   Musculoskeletal:         General: No deformity.      Right lower leg: Edema present.      Left lower leg: Edema present. "   Lymphadenopathy:      Cervical: No cervical adenopathy.   Skin:     General: Skin is warm and dry.      Coloration: Skin is not jaundiced or pale.      Findings: No rash.   Neurological:      Mental Status: She is lethargic.           Lab Results:    Results from last 7 days   Lab Units 03/18/24  0533 03/18/24  0429   WBC Thousand/uL  --  8.39   HEMOGLOBIN g/dL 5.3* 5.6*   HEMATOCRIT % 16.3* 17.6*   PLATELETS Thousands/uL  --  89*     Results from last 7 days   Lab Units 03/18/24  0429   POTASSIUM mmol/L 4.1   CHLORIDE mmol/L 107   CO2 mmol/L 18*   BUN mg/dL 81*   CREATININE mg/dL 1.89*   CALCIUM mg/dL 8.0*

## 2024-03-18 NOTE — PROGRESS NOTES
[facility]  Progress Note: Critical Care  Name: Carla Hunt 59 y.o. 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; complicated by  Wound dehiscence 2/2 poor wound healing s/p wound vac 3/13  Elevated Cr in setting of Bactrim  CKD3  Anemia- multifactorial- chronic inflammation, iatrogenic  History of B-Cell lymphoma, s/p chemotheraphy 2022, Rituxan maintenance therapy on hold  Minimal SAH POA, no interventions required  Seizure disorder; on home AEDs  Steroid induced hyperglycemia;controlled on insulin gtt   Elevated TG  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   vEEG 1/10-12 negative for seizure like activity   Waxing and waning neuro exam, most recently opened eyes to voice after trach 3/13, however now with unappreciable neuro exam on frequent neuro checks despite sedation holiday after. PERRL  CTH 3/13 negative for acute intracranial findings.   Etiology unclear, repeat imaging with stable findings. Has been off sedation. Electrolytes, sodium, BS at goal. May be prolonged 2/2 PNA vs ?steroid induced ?uremic encephalopathy, however  less likely given GFR not <15.       Plan:  Frequent neurochecks q1h  Avoid PRN fentanyl/sedative meds as able   ?MRI brain to evaluate for eval for encephalitis/inflammatory process, however may not    Can consider LP pending workup  Video EEG deferred at thist time due to low suspicion for seizures. Less likely non-convulsive status epilepticus as patient is on home AEDs.     Diagnosis: seizure disorder  S/p partial left temporal lobe resection in 2007  On Lamictal 150 bid and Topamax 50mg bid  Last lamotrigine level from 03/02 at 10.7 (therapeutic)     Diagnosis: Anxiety  On Effexor 37.5 mg QD     CV:   Diagnosis: Shock, likely hypovolemic  MAPs in  high 50s-60s, tachycardia overnight  In setting of blood loss anemia  TTE 3/17 with no major structural dysfunction  Bedside POUS 3/18  revealing IVC <1 cm and fully collapsible with inspiration     Plan:  S/P 500cc bolus  PRBCs transfusion to treat blood loss anemia  D/C metoprolol; sinus tachy likely 2/2 anemia   Maintain MAPs > 65 mmHg        Pulm:  Diagnosis: Acute hypoxic respiratory failure 11  Previously did not tolerate HFNC, non-invasive mechanical vent. Intubated 2/13-3/1.  Reintubated 3/11 due to increased WOB in setting of elevated CRP; S/P tracheostomy 3/12  Persistently COVID+, PCR+ on 3/3, 3/11. S/P multiple courses of antivirals (most recent completed 3/15). Complicated by recurrent bacterial PNA s/p 10d course levaquin 2/25 for MDR PNA; most recent BAL +recurrent stenotrophomonas, on Bactrim.   Etiology Likely multifactorial due to COVID pneumonitis in setting of persistent COVID-19 infection and recurrent PNA. Persistent inflammation and fibrosis also likely given patient has been steroid responsive throughout admission.    CRP continues downward trend, most recent CRP 38, as high as 166 on 3/11  Repeat CXR 3/14 with grossly unchanged diffuse parenchymal disease     Plan:  Finished 14d course of Paxlovid/remdesivir on 3/15  Continue  Bactrim 2 DS tabs bid crushed via NGT to tx Stenotrophomonas PNA as below  Follow-up BAL Cx's and studies including CD4/CD8 count, leukemia/lymphoma panel, PCP pcr  IV Solu-Medrol 40 mg q6h x3d. Plan for slow steroid wean by 10mg every 3d until patient is down to 10mg q6h and then start decreasing frequency till patient is off.  Can consider starting steroid sparing therapy in consultation with pharmacy if patient able to tolerate steroid wean.  Trend CRP daily to guide steroid therapy  ID following, recommend broadening antibiotic coverage from cefepime to meropenem 1 g IV Q8h if patient clinically deteriorates with concerns of progressive sepsis     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  Complicated by poor wound healing of midline incision as evidenced by wound dehiscence s/p wound vac   Stoma with dark brown output, consistent with prior  Plan:  Monitor output of ostomy pouch and Hemoglobin  Surgery following, will keep them updated if any further changes  Tube feeds running  Wound vac changed on 3/15     :   Diagnosis: ELIESER on CKD III   Baseline Cr appears to be 0.8-1.2)  Cr remains elevated but stable at 1.8 S/P IVF 1L  Elevated Cr 2/2 bactrim given no significant improvement with IVF  Also with likely prerenal azotemia likely 2/2 hypovolemia   If creatinine increases to 1.9, will change to 1 DS BID or minocycline  UO adequate on Ortiz, draining clear yellow urine    Plan:  S/P IVF  Repeaet BMP at noon  Trend daily BMP  Continue to monitor I/O and UOP     Diagnosis: metabolic acidosis  Normal gap, Cl wnl  Positive urine gap  No loose stools/diarrhea reported  Likely 2/2 uremic acidosis given elevated BUN. Less likely med-induced/Topamax/RTA2 given positive UG. ?RTA1 in stting of hypokalemia however less likely given normal serum Cl  Plan:  S/P IVF  Trend bmp  Transufse to correct Hgb     F/E/N:   F: none  E: monitor and replete for goal K>4, P>3, Mg>2;  y  N: On tube feeds with vital 1.5; 45 cc/h, Prosource liquid protein to 1 packet BID     Heme/Onc:   Diagnosis: Anemia  Hgb 5.3 this AM on repeat check. Recently has been around 7-8. Total of 6U transfused since admission  BUN elevated but likely prerenal/ 2/2 hypovol. No significant bloody output in ostomy bag.  Likely multifactorial in nature, iatrogenic cause 2/2 multiple daily blood draws, acute versus early nearly chronic in the setting of inflammatory state as evidenced by ferritin 974, likely further complicated by chronic anemia due to her other history of lymphoma and CKD  Plan:  Transfuse 2U PRBCs now  Trend CBC, transfuse to maintain Hgb>7     Diagnosis: B cell lymphoma  Follows with heme/onc at [facility]  S/P 6 cycles of chemotherapy   Maintained on Rituxan for 2 year course PTA, started May 2022  Last dose was 12/20, treatment currently on hold   Leukopenic on admission but WBC now limited likely in setting of steroid therapy     Plan:  Holding rituximab in setting of persistent COVID-19 infection      DVT ppx with lovenox     Endo:   Diagnosis: steroid hyperglycemia and diabetes mellitus type 2, A1c at 6.6 from 01/2024  Goal -180  BG range last 24hrs 113-174  Plan:   On insulin gtt        ID:   Diagnosis: Recurrent bacterial pneumonia  Patient has completed multiple treatment courses of remdesivir throughout hospitalization in addition to 7 days of ceftriaxone.   BAL cultures in 2/2024 positive for MDR Pseudomonas and stenotrophomonas treated with 10d course of Levaquin  CT C/A/P 3/10 with persistent groundglass opacities and changes suggestive of sequelae of COVID, prior infections.  Completed 10d course of cefepime for broad spectrum coveragge (completed (3/13)  Repeat BAL cultures from 3/11 showing 4+ growth Stenotrophomonas maltophilia. Could be colonization given prior BAL growth of same, however due to recent intubation with prolonged corticosteroid theraphy, will begin treating as true  pathogen. Patient otherwise remains afebrile, no leukocytosis. CRP with down trending.   Plan:  Bactrim 2 DS tabs BID crushed via NGT, plan to treat for 10d course through 3/23  If Cr increases to 1.9,  change to 1 DS BID or minocycline  IV steroids as above  No present indications to tx Candida in respiratory culture, likely respiratory colonization        MSK/Skin:   Diagnosis: Midline incision wound with poor wound healing  General surgery performed staple removal of the patient's midline incision on 3/12 with some skin signs appreciated at the inferior aspect of the wound without obvious pus drainage. Overnight 3/13, wound dehiscence progressed requiring general surgery reevaluation. Red/brown aspirate noted, fascia intact. Wet-to-dry dressings in place. General surgery following for next Epson wound care.   Suspect poor wound healing in setting of high-dose steroid therapy.  No  exam no obvious signs of infection at this time.    S/P wound vac placement 3/13 as per gen surgery. No active concerns for cellulitis.   Plan:   Routine monitoring, wound care as per general surgery.     Diagnosis: Critical illness myopathy  Likely exacerbated by high-dose steroid therapy  Plan:  Continue to wean steroids as above  PT/OT following    Disposition: Critical care    ICU Core Measures     Vented Patient  VAP Bundle  VAP bundle ordered     A: Assess, Prevent, and Manage Pain Has pain been assessed? Yes  Need for changes to pain regimen? No   B: Both Spontaneous Awakening Trials (SATs) and Spontaneous Breathing Trials (SBTs) Plan to perform spontaneous awakening trial today? Yes   Plan to perform spontaneous breathing trial today? Yes   Obvious barriers to extubation? No   C: Choice of Sedation RASS Goal: 0 Alert and Calm  Need for changes to sedation or analgesia regimen? No   D: Delirium CAM-ICU: Unable to perform secondary to Acute cognitive dysfunction   E: Early Mobility  Plan for early mobility? Yes   F: Family  Engagement Plan for family engagement today? Yes       Antibiotic Review: Patient on appropriate coverage based on culture data.     Review of Invasive Devices:    Ortiz Plan: Continue for accurate I/O monitoring for 48 hours    Amelia Plan: Keep arterial line for hemodynamic monitoring and frequent ABGs    Prophylaxis:  VTE VTE covered by:  heparin (porcine), Subcutaneous, 5,000 Units at 03/18/24 0529       Stress Ulcer  not ordered        Significant 24hr Events     Hospital course:  58 y/o F w/ PMHx B-cell lymphoma s/p 6 cycles of benadmustine/rituxan completed 1/2022, maintained on rituximab PTA, epilepsy on AEDs, CKD3, anxiety disorder. Admitted for acute hypoxic respiratory failure in setting of suspected COVID pneumonitis. Hospital day 68, ICU day 26. Initially presented at [facility] on 1/7 with encephalopathy, found to have COVID-19 infection, tx'd  w remdesivir/Decadron/actemra. Extensive neuro work-up including LP negative with exception of small SAH not requiring intervention. Was transferred to [facility] 2/10 for vEEG that was unremarkable. Since then, has had protracted hospital course complicated by several episodes of hypoxic respiratory failure requiring HFNC and high-dose corticosteroid therapy in addition to repeated courses of remdesivir/Paxlovid 1/13-1/20, feb, 3/6-3/15 for persistent COVID-19 infection further complicated MDR Pseudomonas and stenotrophomonas PNA treated with 10d course of Levaquin (completed 2/28). Was electively intubated on 2/13 due to persitent HFNC requirments leading to severe epistaxis. After attempted steroid wean, was reinitiated on remdesivir course 2/20 to 2/25 due to elevated CRPs. Also received treatment with Veletri, IVIG course (completed 2/15). Course complicated by cecal volvulus status post R hemicolectomy with ostomy pouch creation 2/20 complicated by poor wound healing 2/2 steroids, stoma wall cellulits s/p tx with broadspectrum ABx (cefepime, vancomycin and  Flagyl) in early march. Extubated 3/1,  Extubated 3/1.  Was overall improving clinically until another attempted steroid wean in early March, steroids intensified again due to elevated CRPs and MF/HFNC requriments. Reintubated 3/11 due to AHRF after failing Bipap. S/P Tracheostomy 3/13. BAL+ recurrent stenotrophomonas, on Bactrim. COVID PCR positive on 3/11, completed 14d Paxlovid/remdesivir course 3/15. CRP overall down-trending. Currently remains on high dose steroids that are being weaned by 10mg q3d. Rituximab therapy on hold for B-cell lymphoma in setting of persistent COVID-19 infection. Has required intermittent IV diuresis due to volume overload. Has required intermittent PRBCs transfusions (6U since admission) to maintain Hgb>7 (baseline 7-8), intermittent IV diuresis due to volume overload and has been intermittently agitated which improves with as needed Ativan.     24hr events:  Remained sinus tachy 130s on 3/17 despite ativan/fent.   H&H stable on 3/17, euvolemic.. Was trailed lopressor 25 with some improvement, repeated echo with EF 70% but vigorous systolic function, no WMA. S/P bronch 3/17 due to increased secretions. Overnight, MAPs in 60s. Received 500cc isolyte bolus. Stoma noted to be recessing into wound vac. Gen Surg made aware, to evaluate her later today. Hgb 5.6 this AM, repeat 5.3. No overt s/s acute GIB. S/P 2U irradiated PRBCs.      Subjective     Review of Systems   Unable to perform ROS: Acuity of condition        Objective                            Vitals I/O      Most Recent Min/Max in 24hrs   Temp 98.9 °F (37.2 °C) Temp  Min: 97.9 °F (36.6 °C)  Max: 99 °F (37.2 °C)   Pulse 94 Pulse  Min: 89  Max: 138   Resp 19 Resp  Min: 12  Max: 37   BP (!) 87/51  See A-line BP BP  Min: 78/39  Max: 154/66  See A-line MAPs   O2 Sat 96 % SpO2  Min: 91 %  Max: 100 %     Vent: PS 12/6/40%    LDA:  NG tube, 12 days   RUQ ileostomy, 26 days  Ortiz, 12 days  A-line  2 peripheral IV's, 2d  Left midline, 9  days  Surgical airway, cuffed, 5 days     Intake/Output Summary (Last 24 hours) at 3/18/2024 0707  Last data filed at 3/18/2024 0600  Gross per 24 hour   Intake 2833.62 ml   Output 1920 ml   Net 913.62 ml       Diet Enteral/Parenteral; Tube Feeding No Oral Diet; Vital 1.5; Continuous; 45; Prosource Protein Liquid - One Packet; OD; 300; Water; Every 6 hours    Invasive Monitoring   Arterial Line  Amelia /50  Arterial Line BP  Min: 103/43  Max: 156/61   MAP 68 mmHg  Arterial Line MAP (mmHg)  Min: 59 mmHg  Max: 93 mmHg           Physical Exam   Physical Exam  Eyes:      Pupils: Pupils are equal, round, and reactive to light.   Skin:     General: Skin is warm.   Neck:      Trachea: Tracheostomy present.   Cardiovascular:      Rate and Rhythm: Normal rate and regular rhythm.      Heart sounds: No murmur heard.  Musculoskeletal:         General: No swelling.      Right lower leg: No edema.      Left lower leg: No edema.   Abdominal: General: An ostomy site is present. There is ostomy site.There is no distension.      Palpations: Abdomen is soft.      Tenderness: There is no abdominal tenderness.      Comments: Midline surgical incision, covered in dressing, no active discharge  Ostomy with red/brown output  Wound vac in place, 150cc output   Constitutional:       Appearance: She is ill-appearing.   Pulmonary:      Effort: No accessory muscle usage, respiratory distress or accessory muscle usage.      Breath sounds: Normal breath sounds.   Neurological:      Comments: Neuro exam unappreciable, not responding to voice, does not follow simple commands    Genitourinary/Anorectal:     Comments: Ortiz draining 100cc clear yellow urine    Ortiz present.          Diagnostic Studies      EKG 3/12  Sinus tachycardia, rate 110  Poor R Wave Progression  Nonspecific ST and T wave abnormality  TTE 3/17-EF 70%, vigorous systolic function, reduced global longitudinal strain at -14%.  Normal WMA.  Mild aortic regurg, trace  mitral/tricuspid/pulmonic regurg.  Normal IVC size.  No pericardial effusion.  Imaging:   CXR 3/14 worsening consolidation throughout R and LLL consistent with multilobar pneumonia, possibly aspiration.  CTH 3/13: No acute intracranial pathology, stable postop changes status post prior L temporal lobectomy, chronic microangiopathy.       Medications:  Scheduled PRN   chlorhexidine, 15 mL, Q12H SARTHAK  heparin (porcine), 5,000 Units, Q8H SARTHAK  ipratropium, 0.5 mg, TID  lamoTRIgine, 150 mg, BID  levalbuterol, 1.25 mg, TID  lidocaine (PF), 10 mL, Once  methylPREDNISolone sodium succinate, 40 mg, Q6H SARTHAK   Followed by  [START ON 3/20/2024] methylPREDNISolone sodium succinate, 30 mg, Q6H SARTHAK   Followed by  [START ON 3/23/2024] methylPREDNISolone sodium succinate, 20 mg, Q6H SARTHAK   Followed by  [START ON 3/26/2024] methylPREDNISolone sodium succinate, 10 mg, Q6H SARTHAK   Followed by  [START ON 3/29/2024] methylPREDNISolone sodium succinate, 10 mg, Q8H SARTHAK   Followed by  [START ON 4/1/2024] methylPREDNISolone sodium succinate, 10 mg, Q12H SARTHAK   Followed by  [START ON 4/8/2024] methylPREDNISolone sodium succinate, 10 mg, Daily  nystatin, , BID  polyethylene glycol, 17 g, Daily  sodium chloride, 2 spray, Q4H  sodium chloride, 4 mL, TID  sulfamethoxazole-trimethoprim, 2 tablet, Q12H SARTHAK  topiramate, 50 mg, BID  venlafaxine, 37.5 mg, Daily      acetaminophen, 650 mg, Q6H PRN  fentaNYL, 50 mcg, Q1H PRN  LORazepam, 0.5 mg, Q6H PRN  OLANZapine, 10 mg, HS PRN  ondansetron, 4 mg, Q6H PRN  oxyCODONE, 2.5 mg, Q4H PRN   Or  oxyCODONE, 5 mg, Q4H PRN  sodium chloride, 1 Application, Q1H PRN       Continuous    insulin regular (HumuLIN R,NovoLIN R) 1 Units/mL in sodium chloride 0.9 % 100 mL infusion, 0.3-21 Units/hr, Last Rate: 3 Units/hr (03/18/24 0608)  norepinephrine, 1-30 mcg/min, Last Rate: Stopped (03/15/24 4663)  propofol, 5-50 mcg/kg/min, Last Rate: Stopped (03/17/24 6184)         Labs:    CBC    Recent Labs     03/17/24  4129  03/17/24  0800 03/18/24 0429 03/18/24  0533   WBC 11.78*  --  8.39  --    HGB 6.8*   < > 5.6* 5.3*   HCT 21.5*  --  17.6* 16.3*   *  --   --   --     < > = values in this interval not displayed.     BMP    Recent Labs     03/17/24  0451 03/18/24 0429   SODIUM 137 135   K 4.3 4.1    107   CO2 19* 18*   AGAP 13 10   BUN 71* 81*   CREATININE 1.65* 1.89*   CALCIUM 8.2* 8.0*      Ur Na: 40  Ur K: 32  Ur Cl: 35       Coags    No recent results     Additional Electrolytes  Recent Labs     03/17/24 0451 03/18/24 0429   MG 2.2 2.3   PHOS 3.6 4.0   CAIONIZED  --  1.20          Blood Gas    Recent Labs     03/17/24  0812   PHART 7.375   [id number] 32.1*   PO2ART 64.4*   [id number] 18.4*   BEART -6.1   SOURCE Line, Arterial     Recent Labs     03/17/24  0812   SOURCE Line, Arterial    LFTs  No recent results     CRP: 38.2 (<78.9<13.4<18.6<<73.6<<106)     Infectious  Micro-    BAL Cx 3/11-  4+ growth of stenotrophomonas maltophilia  2+ growth of Candida albicans  2+ growth of mixed respiratory lauren    Fungal culture obtained from bronchial washing 3/11  4+growth of yeast species    COVID PCR 3/11 positive    MRSA nares 3/5 negative    Blood cultures x 2 2/26 no growth after 5 days   Glucose  Recent Labs     03/17/24 0451 03/18/24 0429   GLUC 143* 139               Joe Lazcano DO

## 2024-03-18 NOTE — RESPIRATORY THERAPY NOTE
RT Ventilator Management Note  Carla Hunt 59 y.o. female MRN: [id number]  Unit/Bed#: [facility] 12 Encounter: [id number]      Daily Screen         3/17/2024  1946 3/18/2024  0750          Patient safety screen outcome:: Passed Passed                Physical Exam:   Assessment Type: Assess only  General Appearance: Sleeping  Respiratory Pattern: Spontaneous, Assisted  Chest Assessment: Chest expansion symmetrical  Bilateral Breath Sounds: Diminished  Cough: None  Suction: Trach  O2 Device: G5      Resp Comments: Pt tolerating psv of 12. VT>600 , psv decreased to 10. BS clear, sx for no secretions. Will cont to monitor pts resp status.

## 2024-03-18 NOTE — PROGRESS NOTES
Progress Note - Infectious Disease   Carla Hunt 59 y.o. female MRN: [id number]  [facility] En[facility]: [id number]      Impression/Plan:    1. Acute hypoxic respiratory failure, likely multifactorial, with both COVID and bacterial pneumonia contributing.  Also consider persistent inflammation, fibrosis as seems quite steroid responsive in past.  Most recently extubated 3/1.  Patient with worsening respiratory status requiring intubation again 3/11. Consider progressive bacterial infection given increased secretions, although has been on antibiotics and has no fever, leukocytosis, or focal infiltrates on recent CT.  Consider recrudescence of inflammation in setting of steroid taper as this has been observed previously.  CRP decreased with increase in steroids, no change in antibiotics.  Still has low-level COVID shedding although suspect this is not primarily driving worsening hypoxia.  Status post bronchoscopy and trach 3/12 with excessive secretions seen from the lower lobes bilaterally.  BAL culture from 3/11 shows heavy growth of Stenotrophomonas maltophilia.  PJP DFA negative. While the patient has grown this before and she certainly may be colonized, given worsening CT findings, intubation and prolonged steroids would treat as a true pathogen.  Status post 10 days of vancomycin and cefepime.  Started on Bactrim 3/13.  Patient completed 14-day course of remdesivir/Paxlovid 3/15.  CRP improving, patient is on PS but continues to have profound weakness and poor mental status.  Status post repeat bronchoscopy yesterday for airway clearance with continued thick secretions.  -Due to worsening renal function, will stop p.o. Bactrim and start p.o. minocycline 100 mg twice daily via NG tube.  Tube feeds should be held for 1 hour prior to and 1 hour after minocycline administration. E test requested by the micro lab  -Steroid dosing per critical care, tolerating weaning with improving CRP  -No indication to  treat Candida in respiratory culture, this is respiratory colonization  -Trend CRP  -If clinically deteriorates with concern for progressive sepsis would add meropenem 1g IV Q8 to the Bactrim     2. SIRS versus sepsis.  Fluctuating fever, WBC.  Fevers may be multifactorial due to COVID (still positive antigen and PCR) versus inflammation (seem to correlate to steroid dosing).  Also consider developing bacterial infection.  Recent blood cultures 2/26 negative.  CT C/A/P 3/10 shows stable groundglass and nodular opacities, inflammation around stoma. Now with dehiscence of her abdominal wound following staple removal, status post wound VAC placement 3/13. Patient afebrile, without leukocytosis  -Follow-up repeat CT imaging  -antibiotics as above  -Follow temperatures closely  -Recheck WBC in AM to monitor infection  -Supportive care as per the primary service     3. Recurrent bacterial pneumonia.  The patient has completed multiple treatment courses over the hospitalization.  Most recent BAL culture with Pseudomonas and stenotrophomonas.  Unclear if pathogens or colonizers.  Status post 10 days levofloxacin.  CT C/A/P showed evolving changes likely all due to sequelae of COVID, infections.  Noted to have worsening hypoxia and purulent secretions on bronchoscopy 3/11.  BAL culture with heavy growth of Stenotrophomonas maltophilia              -Antibiotics as above              -Wean O2 as able     4. Severe COVID, present on admission.  Patient was treated with a 10-day course of remdesivir, dexamethasone and was given 1 dose of Tocilizumab on admission.  COVID antigen was negative prior to coming off isolation.  Had positive COVID PCR from BAL 2/16, status post another 5-day course of remdesivir.  Patient has remained on high-dose systemic corticosteroid throughout her hospitalization which were recently intensified 2/26 for fevers.  Patient having persistent fevers, hypoxia.  COVID antigen positive and PCR is also  positive 3/3 and Ct 26.8, consistent with high viral replication.  Repeat PCR positive with Ct now closer to 30. CRP overall decreasing with slow steroid wean.  Status post 14-day course of remdesivir/Paxlovid 3/15/2024  -Steroid wean per ICU  -No additional antivirals indicated     5. 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  Patient is status post exploratory laparotomy with ileocecectomy and end ileostomy creation 2/20.   End ileostomy is with ongoing ischemic changes which is likely contributing to the imaging findings and ongoing SIRS response.  Now with wound dehiscence status post staple removal, status post wound VAC placement 3/13, wound VAC now removed  -Serial exams  -Surgery follow-up   -no current signs of infection, need for antibiotics      B-cell lymphoma, on maintenance rituxan, which is currently postponed.  Rituximab is a risk factor for persistent COVID infection.    7.  ELIESER.  More likely due to hypovolemia since creatinine was increasing prior to starting Bactrim.   -Monitor creatinine closely   -Dose adjust antibiotics as needed    8.  Anemia, thrombocytopenia, lymphopenia.  Patient has had persistent lymphopenia throughout this admission, likely due to rituximab, viral infection, high-dose steroids.  The patient now has worsening anemia over the last 24 hours and new thrombocytopenia.   -Will discontinue Bactrim in case this is contributing to cytopenias   -Steroid wean per primary   -Transfusion support per primary   -Follow-up repeat CT imaging   -Consider hematology consult   -Check CMV PCR    Above management plan to transition to p.o. minocycline with the critical care resident who is in agreement.  Discussed with the  at bedside. ID will follow.    Antibiotics:  Day 6 Bactrim    Subjective:  The patient has worsening anemia today requiring transfusion support.  She remains on PS on the ventilator.  She opens her eyes to voice but remains profoundly weak. No  fevers.    Objective:  Vitals:  Temp:  [97.6 °F (36.4 °C)-98.9 °F (37.2 °C)] 97.6 °F (36.4 °C)  HR:  [] 133  Resp:  [16-37] 23  BP: ()/(39-83) 122/74  SpO2:  [95 %-100 %] 97 %  Temp (24hrs), Av.1 °F (36.7 °C), Min:97.6 °F (36.4 °C), Max:98.9 °F (37.2 °C)  Current: Temperature: 97.6 °F (36.4 °C)    Physical Exam:   General Appearance:  Ill-appearing, lethargic but now awakens to voice   Neck: Trach in place   Lungs:   Rhonchi bilaterally   Heart:  RRR; no murmur, rub or gallop   Abdomen:   Midline wound dehiscence with wound VAC in place, no surrounding erythema   Extremities: No edema   Skin: No new rashes or lesions.        Labs:   All pertinent labs and imaging studies were personally reviewed  Results from last 7 days   Lab Units 24  0533 24  0800 24  04524  0611   WBC Thousand/uL  --  8.39  8.39  --  11.78* 9.47   HEMOGLOBIN g/dL 5.3* 5.6*  5.6* 7.5* 6.8* 7.4*   PLATELETS Thousands/uL  --  89*  89*  --  124* 139*     Results from last 7 days   Lab Units 24  0611   SODIUM mmol/L 135 137 137   POTASSIUM mmol/L 4.1 4.3 3.9   CHLORIDE mmol/L 107 105 107   CO2 mmol/L 18* 19* 18*   BUN mg/dL 81* 71* 65*   CREATININE mg/dL 1.89* 1.65* 1.65*   EGFR ml/min/1.73sq m 28 33 33   CALCIUM mg/dL 8.0* 8.2* 8.2*           Results from last 7 days   Lab Units 24  04524  0611 03/15/24  0603 24  0542 24  0449 24  0401   CRP mg/L 38.2* 78.9* 13.4* 18.6* 32.3* 73.6* 106.3*           Results from last 7 days   Lab Units 24  0446   D-DIMER QUANTITATIVE ug/ml FEU 0.54*         Imaging:  CT chest, abdomen, pelvis personally reviewed and shows persistent bilateral groundglass nodular consolidation

## 2024-03-18 NOTE — PROCEDURES
Acute Care Surgery  Bedside V.A.C. Procedure Note    A timeout was performed with the patient's nurse, Francisco, prior to beginning the dressing change. The nurse remained present to confirm the correct dressing counts on removal of the VAC dressing and was debriefed at the completion of the dressing change.    Location of wound: Abdomen    Dressings and Foam removed:  1 piece of oil emulsion gauze dressing  2 Black Foam - 1 piece of black foam removed from the wound; second piece of black foam removed was a bridge to the lateral abdominal wall  1 White Foam    Dimensions of wound: 11 cm x 7 cm x 4 cm    Description of wound: On inspection of the wound today, the wound base was overall pink and healthy, but there was stool throughout the wound bed following removal of the VAC dressing.  There was some stool stained fibrinous tissue at the wound base.  At the 9 o'clock position in the midline wound, there was a tunneling area in the subcutaneous tissue to the adjacent end ileostomy and mucous fistula allowing stool to contaminate the midline wound. There was no necrotic or nonviable tissue requiring debridement at this time.  The wound was copiously irrigated with saline revealing an otherwise clean and healthy wound base following irrigation.  The adjacent stomal wound also appeared clean and healthy and there was a blood clot overlying a portion of the stoma with no evidence of active bleeding at this time and this was not obstructing the stoma or stool effluent.  The periwound skin remains clean and intact and unremarkable.    VAC dressing application:  The periwound skin was cleaned and dried.  The subcutaneous tract between the stomas and stoma wound and the midline wound was occluded with stoma paste.  Next, 1 piece of black foam was cut to fit in the midline wound and then additional piece of stoma paste was applied to the lateral wall of the black foam dressing where it would communicate through the subcutaneous  tract to the peristomal wound and this dressing was placed into the midline wound. The dressing were then covered with VAC drape. Additional VAC drape and black foam was used to create a bridge to the patient's left abdominal wall and a base for the track pad. The track pad was then placed over the base of black foam. The VAC was then set to 125 mmHg low Continuous suction.    Upon applying suction to the dressing, there was no leak and good seal indicating adequate isolation of the midline abdominal wound from the stoma was an peristomal wound at this time.    Following completion of the VAC dressing application, the peristomal wound was gently irrigated and patted dry, the peristomal wound skin was prepped with No Sting barrier foam and a new 1 piece ostomy appliance was placed.  The VAC dressing maintained suction with no leak throughout this process again suggesting that the stomas and peristomal wound were isolated from the midline wound.    The patient tolerated the procedure well and there were no complications. The patient did not require any excisional debridement during today's dressing change.    VAC settings:  125 mmHg  Continuous    Additional Notes:  The VAC sticker placed over the dressing per protocol.  The next VAC dressing change will be planned for Wednesday, 3/20/2024.    This dressing change took greater than 40 minutes to complete.    Ajit Gr PA-C  3/18/2024 11:43 AM

## 2024-03-18 NOTE — NUTRITION
Nutrition Follow-Up:       03/18/24 5656   Recommendations/Interventions   Interventions/Recommendations Continue EN as ordered   Recommendations to Provider Current TF regimen remains appropriate to meet estimated nutrition needs at this time.

## 2024-03-18 NOTE — PROGRESS NOTES
[facility]  Progress Note  Name: Carla Hunt I  MRN: [id number]  Unit/Bed#: [facility] 12 I Date of Admission: 1/10/2024   Date of Service: 3/18/2024 I Hospital Day: 68    Assessment/Plan   Teto marginal zone B-cell lymphoma (HCC)  Assessment & Plan  Previously on maintenance Rituxan therapy. Last treatment in December 2023    Encephalopathy  Assessment & Plan  Patient with minimal responsiveness. Prior brain imaging relatively unremarkable. Appears to have prolonged drowsiness after any procedures requiring sedation.  Discussed consideration of provigil 100mg QAM with primary team who agreed.    Anxiety state  Assessment & Plan  Continue Effexor per primary  Ativan 0.5mg Q6H PRN    * Acute respiratory failure with hypoxia (HCC)  Assessment & Plan  Patient was re-intubated 3/11 with subsequent bedside trach on 3/12.   Very careful steroid management and work-up per critical care team.            Interval history:       Patient underwent bronch yesterday due to increased secretions. Slow waking up from sedation post-procedure. Surgery re-engaged due to stool comin gfrom midline incision. Patient receiving 2 units PRBC due to hgb of 5.3. Pending CT scan pending renal function. During time of evaluation moves LUE spontaneously otherwise very drowsy. Spouse, Mitchell, at bedside. Provided supportive listening.    MEDICATIONS / ALLERGIES:     all current active meds have been reviewed    No Known Allergies    OBJECTIVE:    Physical Exam  Physical Exam  Constitutional:       General: She is not in acute distress.     Appearance: She is ill-appearing.   HENT:      Head: Atraumatic.   Cardiovascular:      Rate and Rhythm: Normal rate.   Pulmonary:      Comments: Ventilated via trach  Abdominal:      Comments: Wound vac visualized via media tab   Musculoskeletal:         General: Swelling present.   Skin:     General: Skin is dry.   Neurological:      Comments: Spontaneous movement of  LUE otherwise drowsy   Psychiatric:      Comments: calm         Lab Results:   Results from last 7 days   Lab Units 03/18/24  0533 03/18/24  0429 03/17/24  0800 03/17/24  0451 03/16/24  0611 03/13/24  0210 03/12/24  0758   WBC Thousand/uL  --  8.39  8.39  --  11.78* 9.47   < > 9.71   HEMOGLOBIN g/dL 5.3* 5.6*  5.6* 7.5* 6.8* 7.4*   < > 7.7*   HEMATOCRIT % 16.3* 17.6*  17.6*  --  21.5* 22.2*   < > 23.6*   PLATELETS Thousands/uL  --  89*  89*  --  124* 139*   < > 179   MONO PCT %  --  0*  --   --   --   --  2*   EOS PCT %  --  0  --   --   --   --  0    < > = values in this interval not displayed.     Results from last 7 days   Lab Units 03/18/24 0429 03/17/24 0451 03/16/24  0611   POTASSIUM mmol/L 4.1 4.3 3.9   CHLORIDE mmol/L 107 105 107   CO2 mmol/L 18* 19* 18*   BUN mg/dL 81* 71* 65*   CREATININE mg/dL 1.89* 1.65* 1.65*   CALCIUM mg/dL 8.0* 8.2* 8.2*       Imaging Studies: reviewed pertinent studies   EKG, Pathology, and Other Studies: reviewed pertinent studies    Counseling / Coordination of Care    Total floor / unit time spent today 35 minutes. Greater than 50% of total time was spent with the patient and / or family counseling and / or coordination of care. A description of the counseling / coordination of care: time spent assessing patient, providing support to spouse at bedside, coordinating care with RN and primary team.

## 2024-03-18 NOTE — RESPIRATORY THERAPY NOTE
03/18/24 0305   Respiratory Assessment   Assessment Type Assess only   General Appearance Sleeping   Respiratory Pattern Spontaneous;Assisted   Chest Assessment Chest expansion symmetrical   Bilateral Breath Sounds Diminished   Cough None   Suction Trach   Resp Comments Pt. remains on SPONT settings, no changes at this time.   O2 Device G5   Vent Information   Vent ID [id number]   Vent type Summers G5   Summers Vent Mode SPONT   $ Vent Daily Charge-Subsequent Yes   $ Pulse Oximetry Spot Check Charge Completed   SPONT Settings   FIO2 (%) 40 %   PEEP (cmH2O) 6 cmH2O   Pressure Support (cmH2O) 12 cmH20   Flow Trigger (LPM) 5 LPM   P-ramp (ms) 50 ms   ETS  (%) 20 %   Humidification Heater   Heater Temp 95 °F (35 °C)   SPONT Actuals   Resp Rate (BPM) 25 BPM   VT (mL) 643 mL   MV (Obs) 14.9   MAP (cmH2O) 6.6 cmH2O   Peak Pressure (cmH2O) 23 cmH2O   I:E Ratio (Obs) 1/2.8   RSBI (f/vt) 37 f/Vt   Heater Temperature (Obs) 95 °F (35 °C)   SPONT Alarms   High Peak Pressure (cmH20) 40 cmH2O   High Resp Rate (BPM) 45 BPM   High MV (L/min) 26 L/min   Low MV (L/min) 5 L/min   High Spont VTE (mL) 1000 mL   Low Spont VTE (mL) 250 mL   Apnea Time (S) 20 S   SPONT Apnea Settings   Resp Rate (BPM) 15 BPM  (PC 15)   FIO2 (%) 40 %   %TI (%) 1.3 %   Apnea Time (S) 20 S   Maintenance   Alarm (pink) cable attached No   Resuscitation bag with peep valve at bedside Yes   Water bag changed Yes   Circuit changed No   Surgical Airway Shiley Cuffed   Placement Date/Time: 03/12/24 1200   Tube Size: 8 mm  Type: Tracheostomy  Brand: Naveen  Style: Cuffed   Status Cuff Inflated;Secured   Site Assessment Clean;Dry;No bleeding;No drainage;Sutures present   Ties Assessment Clean;Dry;Intact   Equipment at bedside BVM;Wall Suction setup;Additional complete same size trach tube;Additional complete one size smaller trach tube;Obturator;Sterile saline;Additional inner cannula     RT Ventilator Management Note  Carlamarcelle Stockkaterina 59 y.o. female MRN:  [id number]  Unit/Bed#: [facility] 12 Encounter: [id number]      Daily Screen         3/17/2024  0714 3/17/2024  1946          Patient safety screen outcome:: Passed Passed                Physical Exam:   Assessment Type: (P) Assess only  General Appearance: (P) Sleeping  Respiratory Pattern: (P) Spontaneous, Assisted  Chest Assessment: (P) Chest expansion symmetrical  Bilateral Breath Sounds: (P) Diminished  Cough: (P) None  Suction: (P) Trach  O2 Device: (P) G5      Resp Comments: (P) Pt. remains on SPONT settings, no changes at this time.

## 2024-03-18 NOTE — PLAN OF CARE
Problem: Prexisting or High Potential for Compromised Skin Integrity  Goal: Skin integrity is maintained or improved  Description: INTERVENTIONS:  - Identify patients at risk for skin breakdown  - Assess and monitor skin integrity  - Assess and monitor nutrition and hydration status  - Monitor labs   - Assess for incontinence   - Turn and reposition patient  - Assist with mobility/ambulation  - Relieve pressure over bony prominences  - Avoid friction and shearing  - Provide appropriate hygiene as needed including keeping skin clean and dry  - Evaluate need for skin moisturizer/barrier cream  - Collaborate with interdisciplinary team   - Patient/family teaching  - Consider wound care consult   3/17/2024 2145 by Rafael Franks  Outcome: Progressing  3/17/2024 2145 by Rafael Franks  Outcome: Progressing     Problem: Nutrition/Hydration-ADULT  Goal: Nutrient/Hydration intake appropriate for improving, restoring or maintaining nutritional needs  Description: Monitor and assess patient's nutrition/hydration status for malnutrition. Collaborate with interdisciplinary team and initiate plan and interventions as ordered.  Monitor patient's weight and dietary intake as ordered or per policy. Utilize nutrition screening tool and intervene as necessary. Determine patient's food preferences and provide high-protein, high-caloric foods as appropriate.     INTERVENTIONS:  - Monitor oral intake, urinary output, labs, and treatment plans  - Assess nutrition and hydration status and recommend course of action  - Evaluate amount of meals eaten  - Assist patient with eating if necessary   - Allow adequate time for meals  - Recommend/ encourage appropriate diets, oral nutritional supplements, and vitamin/mineral supplements  - Order, calculate, and assess calorie counts as needed  - Recommend, monitor, and adjust tube feedings and TPN/PPN based on assessed needs  - Assess need for intravenous fluids  - Provide specific  nutrition/hydration education as appropriate  - Include patient/family/caregiver in decisions related to nutrition  3/17/2024 2145 by Rafael Franks  Outcome: Progressing  3/17/2024 2145 by Rafael Franks  Outcome: Progressing     Problem: INFECTION - ADULT  Goal: Absence or prevention of progression during hospitalization  Description: INTERVENTIONS:  - Assess and monitor for signs and symptoms of infection  - Monitor lab/diagnostic results  - Monitor all insertion sites, i.e. indwelling lines, tubes, and drains  - Monitor endotracheal if appropriate and nasal secretions for changes in amount and color  - [place] appropriate cooling/warming therapies per order  - Administer medications as ordered  - Instruct and encourage patient and family to use good hand hygiene technique  - Identify and instruct in appropriate isolation precautions for identified infection/condition  3/17/2024 2145 by Rafael Franks  Outcome: Progressing  3/17/2024 2145 by Rafael Franks  Outcome: Progressing     Problem: SAFETY ADULT  Goal: Patient will remain free of falls  Description: INTERVENTIONS:  - Educate patient/family on patient safety including physical limitations  - Instruct patient to call for assistance with activity   - Consult OT/PT to assist with strengthening/mobility   - Keep Call bell within reach  - Keep bed low and locked with side rails adjusted as appropriate  - Keep care items and personal belongings within reach  - Initiate and maintain comfort rounds  - Make Fall Risk Sign visible to staff  - Offer Toileting every 2 Hours, in advance of need  - Initiate/Maintain bed alarm  - Obtain necessary fall risk management equipment: non skid footwear  - Apply yellow socks and bracelet for high fall risk patients  - Consider moving patient to room near nurses station  3/17/2024 2145 by Rafael Franks  Outcome: Progressing  3/17/2024 2145 by Rafael Franks  Outcome: Progressing     Problem: RESPIRATORY - ADULT  Goal: Achieves optimal  ventilation and oxygenation  Description: INTERVENTIONS:  - Assess for changes in respiratory status  - Assess for changes in mentation and behavior  - Position to facilitate oxygenation and minimize respiratory effort  - Oxygen administered by appropriate delivery if ordered  - Initiate smoking cessation education as indicated  - Encourage broncho-pulmonary hygiene including cough, deep breathe, Incentive Spirometry  - Assess the need for suctioning and aspirate as needed  - Assess and instruct to report SOB or any respiratory difficulty  - Respiratory Therapy support as indicated  3/17/2024 2145 by Rafael Franks  Outcome: Progressing  3/17/2024 2145 by Rafael Franks  Outcome: Progressing     Problem: SKIN/TISSUE INTEGRITY - ADULT  Goal: Skin Integrity remains intact(Skin Breakdown Prevention)  Description: Assess:  -Perform Flavio assessment every shift   -Clean and moisturize skin every day   -Inspect skin when repositioning, toileting, and assisting with ADLS  -Assess under medical devices such as ronny  every hour   -Assess extremities for adequate circulation and sensation     Bed Management:  -Have minimal linens on bed & keep smooth, unwrinkled  -Change linens as needed when moist or perspiring  -Avoid sitting or lying in one position for more than 2 hours while in bed  -Keep HOB at 45 degrees     Toileting:  -Offer bedside commode  -Assess for incontinence every hour  -Use incontinent care products after each incontinent episode such as moisture barrier cream     Activity:  -Mobilize patient 3 times a day  -Encourage activity and walks on unit  -Encourage or provide ROM exercises   -Turn and reposition patient every 2 Hours  -Use appropriate equipment to lift or move patient in bed  -Instruct/ Assist with weight shifting every hour when out of bed in chair  -Consider limitation of chair time 2 hour intervals    Skin Care:  -Avoid use of baby powder, tape, friction and shearing, hot water or constrictive  clothing  -Relieve pressure over bony prominences using allevyn   -Do not massage red bony areas    Next Steps:  -Teach patient strategies to minimize risks such as weight shifting    -Consider consults to  interdisciplinary teams such as PT  3/17/2024 2145 by Rafael Franks  Outcome: Progressing  3/17/2024 2145 by Rafael Franks  Outcome: Progressing  Goal: Incision(s), wounds(s) or drain site(s) healing without S/S of infection  Description: INTERVENTIONS  - Assess and document dressing, incision, wound bed, drain sites and surrounding tissue  - Provide patient and family education  - Perform skin care/dressing changes every 12 hr  3/17/2024 2145 by Rafael Franks  Outcome: Progressing  3/17/2024 2145 by Rafael Franks  Outcome: Progressing  Goal: Pressure injury heals and does not worsen  Description: Interventions:  - Implement low air loss mattress or specialty surface (Criteria met)  - Apply silicone foam dressing  - Instruct/assist with weight shifting every 120 minutes when in chair   - Limit chair time to  hour intervals  - Use special pressure reducing interventions such as wedges when in chair   - Apply fecal or urinary incontinence containment device   - Perform passive or active ROM every 4 hr  - Turn and reposition patient & offload bony prominences every 2 hours   - Utilize friction reducing device or surface for transfers   - Consider consults to  interdisciplinary teams such as pt/ot  - Use incontinent care products after each incontinent episode such as incontinence cleanser  - Consider nutrition services referral as needed  3/17/2024 2145 by Rafael Franks  Outcome: Progressing  3/17/2024 2145 by Rafael Franks  Outcome: Progressing     Problem: NEUROSENSORY - ADULT  Goal: Achieves stable or improved neurological status  Description: INTERVENTIONS  - Monitor and report changes in neurological status  - Monitor vital signs such as temperature, blood pressure, glucose, and any other labs ordered   - Initiate  measures to prevent increased intracranial pressure  - Monitor for seizure activity and implement precautions if appropriate      3/17/2024 2145 by Rafael Franks  Outcome: Progressing  3/17/2024 2145 by Rafael Franks  Outcome: Progressing  Goal: Achieves maximal functionality and self care  Description: INTERVENTIONS  - Monitor swallowing and airway patency with patient fatigue and changes in neurological status  - Encourage and assist patient to increase activity and self care.   - Encourage visually impaired, hearing impaired and aphasic patients to use assistive/communication devices  3/17/2024 2145 by Rafael Franks  Outcome: Progressing  3/17/2024 2145 by Rafael Franks  Outcome: Progressing     Problem: CARDIOVASCULAR - ADULT  Goal: Maintains optimal cardiac output and hemodynamic stability  Description: INTERVENTIONS:  - Monitor I/O, vital signs and rhythm  - Monitor for S/S and trends of decreased cardiac output  - Administer and titrate ordered vasoactive medications to optimize hemodynamic stability  - Assess quality of pulses, skin color and temperature  - Assess for signs of decreased coronary artery perfusion  - Instruct patient to report change in severity of symptoms  3/17/2024 2145 by Rafael Franks  Outcome: Progressing  3/17/2024 2145 by Rafael Franks  Outcome: Progressing  Goal: Absence of cardiac dysrhythmias or at baseline rhythm  Description: INTERVENTIONS:  - Continuous cardiac monitoring, vital signs, obtain 12 lead EKG if ordered  - Administer antiarrhythmic and heart rate control medications as ordered  - Monitor electrolytes and administer replacement therapy as ordered  3/17/2024 2145 by Rafael Franks  Outcome: Progressing  3/17/2024 2145 by Rafael Franks  Outcome: Progressing     Problem: GASTROINTESTINAL - ADULT  Goal: Minimal or absence of nausea and/or vomiting  Description: INTERVENTIONS:  - Administer IV fluids if ordered to ensure adequate hydration  - Maintain NPO status until nausea and  vomiting are resolved  - Nasogastric tube if ordered  - Administer ordered antiemetic medications as needed  - Provide nonpharmacologic comfort measures as appropriate  - Advance diet as tolerated, if ordered  - Consider nutrition services referral to assist patient with adequate nutrition and appropriate food choices  3/17/2024 2145 by Rafael Franks  Outcome: Progressing  3/17/2024 2145 by Rafael Franks  Outcome: Progressing  Goal: Maintains or returns to baseline bowel function  Description: INTERVENTIONS:  - Assess bowel function  - Encourage oral fluids to ensure adequate hydration  - Administer IV fluids if ordered to ensure adequate hydration  - Administer ordered medications as needed  - Encourage mobilization and activity  - Consider nutritional services referral to assist patient with adequate nutrition and appropriate food choices  3/17/2024 2145 by Rafael Franks  Outcome: Progressing  3/17/2024 2145 by Rafael Franks  Outcome: Progressing  Goal: Maintains adequate nutritional intake  Description: INTERVENTIONS:  - Monitor percentage of each meal consumed  - Identify factors contributing to decreased intake, treat as appropriate  - Assist with meals as needed  - Monitor I&O, weight, and lab values if indicated  - Obtain nutrition services referral as needed  3/17/2024 2145 by Rafael Franks  Outcome: Progressing  3/17/2024 2145 by Rafael Franks  Outcome: Progressing  Goal: Establish and maintain optimal ostomy function  Description: INTERVENTIONS:  - Assess bowel function  - Encourage oral fluids to ensure adequate hydration  - Administer IV fluids if ordered to ensure adequate hydration   - Administer ordered medications as needed  - Encourage mobilization and activity  - Nutrition services referral to assist patient with appropriate food choices  - Assess stoma site  - Consider wound care consult   3/17/2024 2145 by Rafael Franks  Outcome: Progressing  3/17/2024 2145 by Rafael Franks  Outcome: Progressing  Goal:  Oral mucous membranes remain intact  Description: INTERVENTIONS  - Assess oral mucosa and hygiene practices  - Implement preventative oral hygiene regimen  - Implement oral medicated treatments as ordered  - Initiate Nutrition services referral as needed  3/17/2024 2145 by Rafael Franks  Outcome: Progressing  3/17/2024 2145 by Rafael Franks  Outcome: Progressing     Problem: GENITOURINARY - ADULT  Goal: Maintains or returns to baseline urinary function  Description: INTERVENTIONS:  - Assess urinary function  - Encourage oral fluids to ensure adequate hydration if ordered  - Administer IV fluids as ordered to ensure adequate hydration  - Administer ordered medications as needed  - Offer frequent toileting  - Follow urinary retention protocol if ordered  3/17/2024 2145 by Rafael Franks  Outcome: Progressing  3/17/2024 2145 by Rafael Franks  Outcome: Progressing  Goal: Urinary catheter remains patent  Description: INTERVENTIONS:  - Assess patency of urinary catheter  - If patient has a chronic marie, consider changing catheter if non-functioning  - Follow guidelines for intermittent irrigation of non-functioning urinary catheter  3/17/2024 2145 by Rafael Franks  Outcome: Progressing  3/17/2024 2145 by Rafael Franks  Outcome: Progressing     Problem: METABOLIC, FLUID AND ELECTROLYTES - ADULT  Goal: Electrolytes maintained within normal limits  Description: INTERVENTIONS:  - Monitor labs and assess patient for signs and symptoms of electrolyte imbalances  - Administer electrolyte replacement as ordered  - Monitor response to electrolyte replacements, including repeat lab results as appropriate  - Instruct patient on fluid and nutrition as appropriate  3/17/2024 2145 by Rafael Franks  Outcome: Progressing  3/17/2024 2145 by Rafael Franks  Outcome: Progressing  Goal: Fluid balance maintained  Description: INTERVENTIONS:  - Monitor labs   - Monitor I/O and WT  - Instruct patient on fluid and nutrition as appropriate  - Assess for  signs & symptoms of volume excess or deficit  3/17/2024 2145 by Rafael Franks  Outcome: Progressing  3/17/2024 2145 by Rafael Franks  Outcome: Progressing  Goal: Glucose maintained within target range  Description: INTERVENTIONS:  - Monitor Blood Glucose as ordered  - Assess for signs and symptoms of hyperglycemia and hypoglycemia  - Administer ordered medications to maintain glucose within target range  - Assess nutritional intake and initiate nutrition service referral as needed  3/17/2024 2145 by Rafael Franks  Outcome: Progressing  3/17/2024 2145 by Rafael Franks  Outcome: Progressing     Problem: HEMATOLOGIC - ADULT  Goal: Maintains hematologic stability  Description: INTERVENTIONS  - Assess for signs and symptoms of bleeding or hemorrhage  - Monitor labs  - Administer supportive blood products/factors as ordered and appropriate  3/17/2024 2145 by Rafael Franks  Outcome: Progressing  3/17/2024 2145 by Rafael Franks  Outcome: Progressing     Problem: MUSCULOSKELETAL - ADULT  Goal: Maintain or return mobility to safest level of function  Description: INTERVENTIONS:  - Assess patient's ability to carry out ADLs; assess patient's baseline for ADL function and identify physical deficits which impact ability to perform ADLs (bathing, care of mouth/teeth, toileting, grooming, dressing, etc.)  - Assess/evaluate cause of self-care deficits   - Assess range of motion  - Assess patient's mobility  - Assess patient's need for assistive devices and provide as appropriate  - Encourage maximum independence but intervene and supervise when necessary  - Involve family in performance of ADLs  - Assess for home care needs following discharge   - Consider OT consult to assist with ADL evaluation and planning for discharge  - Provide patient education as appropriate  3/17/2024 2145 by Rafael Franks  Outcome: Progressing  3/17/2024 2145 by Rafael Franks  Outcome: Progressing     Problem: COPING  Goal: Pt/Family able to verbalize concerns  and demonstrate effective coping strategies  Description: INTERVENTIONS:  - Assist patient/family to identify coping skills, available support systems and cultural and spiritual values  - Provide emotional support, including active listening and acknowledgement of concerns of patient and caregivers  - Reduce environmental stimuli, as able  - Provide patient education  - Assess for spiritual pain/suffering and initiate spiritual care, including notification of Pastoral Care or natalia based community as needed  - Assess effectiveness of coping strategies  3/17/2024 2145 by Rafael Franks  Outcome: Progressing  3/17/2024 2145 by Rafael Franks  Outcome: Progressing  Goal: Will report anxiety at manageable levels  Description: INTERVENTIONS:  - Administer medication as ordered  - Teach and encourage coping skills  - Provide emotional support  - Assess patient/family for anxiety and ability to cope  3/17/2024 2145 by Rafael Franks  Outcome: Progressing  3/17/2024 2145 by Rafael Franks  Outcome: Progressing     Problem: BEHAVIOR  Goal: Pt/Family maintain appropriate behavior and adhere to behavioral management agreement, if implemented  Description: INTERVENTIONS:  - Assess the family dynamic   - Encourage verbalization of thoughts and concerns in a socially appropriate manner  - Assess patient/family's coping skills and non-compliant behavior (including use of illegal substances).  - Utilize positive, consistent limit setting strategies supporting safety of patient, staff and others  - Initiate consult with Case Management, Spiritual Care or other ancillary services as appropriate  - If a patient's/visitor's behavior jeopardizes the safety of the patient, staff, or others, refer to organization procedure.   - Notify Security of behavior or suspected illegal substances which indicate the need for search of the patient and/or belongings  - Encourage participation in the decision making process about a behavioral management  agreement; implement if patient meets criteria  3/17/2024 2145 by Rafael Franks  Outcome: Progressing  3/17/2024 2145 by Rafael Franks  Outcome: Progressing     Problem: Potential for Falls  Goal: Patient will remain free of falls  Description: INTERVENTIONS:  - Educate patient/family on patient safety including physical limitations  - Instruct patient to call for assistance with activity   - Consult OT/PT to assist with strengthening/mobility   - Keep Call bell within reach  - Keep bed low and locked with side rails adjusted as appropriate  - Keep care items and personal belongings within reach  - Initiate and maintain comfort rounds  - Make Fall Risk Sign visible to staff  - Offer Toileting every 2 Hours, in advance of need  - Initiate/Maintain bed alarm  - Obtain necessary fall risk management equipment: non skid footwear  - Apply yellow socks and bracelet for high fall risk patients  - Consider moving patient to room near nurses station  3/17/2024 2145 by Rafael Franks  Outcome: Progressing  3/17/2024 2145 by Rafael Franks  Outcome: Progressing

## 2024-03-18 NOTE — PROCEDURES
POC Cardiac US    Date/Time: 1/10/2024 7:41 PM    Performed by: Nigel Escobar DO  Authorized by: Nigel Escobar DO    Patient location:  ICU  Other Assisting Provider: No    Procedure details:     Exam Type:  Diagnostic    Indications: hypotension and suspected volume depletion      Assessment / Evaluation for: intravascular volume status      Exam Type: follow-up exam      Image quality: limited diagnostic    Patient Details:     Cardiac Rhythm:  Regular    Mechanical ventilation: Yes    Cardiac findings:     Echo technique: limited 2D      Views obtained: parasternal long axis, parasternal short axis, subcostal and apical      Pericardial effusion: absent      Tamponade physiology: absent      Wall motion: hyperdynamic      LV systolic function: normal      RV dilation: none    Pulmonary findings:     Left Lung Findings: left lung sliding      Right lung findings: right lung sliding      B-lines: more than 3    IVC findings:     IVC Size: small      Maximum IVC Diameter (cm):  1    Minimum IVC Diameter (cm):  0    IVC Variability (%):  100  Interpretation:      IVC <1 cm and fully collapsible with inspiration

## 2024-03-18 NOTE — ASSESSMENT & PLAN NOTE
Patient with minimal responsiveness. Prior brain imaging relatively unremarkable. Appears to have prolonged drowsiness after any procedures requiring sedation.  Discussed consideration of provigil 100mg QAM with primary team who agreed.

## 2024-03-18 NOTE — PLAN OF CARE
Problem: Prexisting or High Potential for Compromised Skin Integrity  Goal: Skin integrity is maintained or improved  Description: INTERVENTIONS:  - Identify patients at risk for skin breakdown  - Assess and monitor skin integrity  - Assess and monitor nutrition and hydration status  - Monitor labs   - Assess for incontinence   - Turn and reposition patient  - Assist with mobility/ambulation  - Relieve pressure over bony prominences  - Avoid friction and shearing  - Provide appropriate hygiene as needed including keeping skin clean and dry  - Evaluate need for skin moisturizer/barrier cream  - Collaborate with interdisciplinary team   - Patient/family teaching  - Consider wound care consult   Outcome: Progressing     Problem: Nutrition/Hydration-ADULT  Goal: Nutrient/Hydration intake appropriate for improving, restoring or maintaining nutritional needs  Description: Monitor and assess patient's nutrition/hydration status for malnutrition. Collaborate with interdisciplinary team and initiate plan and interventions as ordered.  Monitor patient's weight and dietary intake as ordered or per policy. Utilize nutrition screening tool and intervene as necessary. Determine patient's food preferences and provide high-protein, high-caloric foods as appropriate.     INTERVENTIONS:  - Monitor oral intake, urinary output, labs, and treatment plans  - Assess nutrition and hydration status and recommend course of action  - Evaluate amount of meals eaten  - Assist patient with eating if necessary   - Allow adequate time for meals  - Recommend/ encourage appropriate diets, oral nutritional supplements, and vitamin/mineral supplements  - Order, calculate, and assess calorie counts as needed  - Recommend, monitor, and adjust tube feedings and TPN/PPN based on assessed needs  - Assess need for intravenous fluids  - Provide specific nutrition/hydration education as appropriate  - Include patient/family/caregiver in decisions related to  nutrition  Outcome: Progressing     Problem: INFECTION - ADULT  Goal: Absence or prevention of progression during hospitalization  Description: INTERVENTIONS:  - Assess and monitor for signs and symptoms of infection  - Monitor lab/diagnostic results  - Monitor all insertion sites, i.e. indwelling lines, tubes, and drains  - Monitor endotracheal if appropriate and nasal secretions for changes in amount and color  - [place] appropriate cooling/warming therapies per order  - Administer medications as ordered  - Instruct and encourage patient and family to use good hand hygiene technique  - Identify and instruct in appropriate isolation precautions for identified infection/condition  Outcome: Progressing     Problem: SAFETY ADULT  Goal: Patient will remain free of falls  Description: INTERVENTIONS:  - Educate patient/family on patient safety including physical limitations  - Instruct patient to call for assistance with activity   - Consult OT/PT to assist with strengthening/mobility   - Keep Call bell within reach  - Keep bed low and locked with side rails adjusted as appropriate  - Keep care items and personal belongings within reach  - Initiate and maintain comfort rounds  - Make Fall Risk Sign visible to staff  - Offer Toileting every 2 Hours, in advance of need  - Initiate/Maintain bed alarm  - Obtain necessary fall risk management equipment: non skid footwear  - Apply yellow socks and bracelet for high fall risk patients  - Consider moving patient to room near nurses station  Outcome: Progressing     Problem: RESPIRATORY - ADULT  Goal: Achieves optimal ventilation and oxygenation  Description: INTERVENTIONS:  - Assess for changes in respiratory status  - Assess for changes in mentation and behavior  - Position to facilitate oxygenation and minimize respiratory effort  - Oxygen administered by appropriate delivery if ordered  - Initiate smoking cessation education as indicated  - Encourage broncho-pulmonary  hygiene including cough, deep breathe, Incentive Spirometry  - Assess the need for suctioning and aspirate as needed  - Assess and instruct to report SOB or any respiratory difficulty  - Respiratory Therapy support as indicated  Outcome: Progressing     Problem: SKIN/TISSUE INTEGRITY - ADULT  Goal: Skin Integrity remains intact(Skin Breakdown Prevention)  Description: Assess:  -Perform Flavio assessment every shift   -Clean and moisturize skin every day   -Inspect skin when repositioning, toileting, and assisting with ADLS  -Assess under medical devices such as ronny  every hour   -Assess extremities for adequate circulation and sensation     Bed Management:  -Have minimal linens on bed & keep smooth, unwrinkled  -Change linens as needed when moist or perspiring  -Avoid sitting or lying in one position for more than 2 hours while in bed  -Keep HOB at 45 degrees     Toileting:  -Offer bedside commode  -Assess for incontinence every hour  -Use incontinent care products after each incontinent episode such as moisture barrier cream     Activity:  -Mobilize patient 3 times a day  -Encourage activity and walks on unit  -Encourage or provide ROM exercises   -Turn and reposition patient every 2 Hours  -Use appropriate equipment to lift or move patient in bed  -Instruct/ Assist with weight shifting every hour when out of bed in chair  -Consider limitation of chair time 2 hour intervals    Skin Care:  -Avoid use of baby powder, tape, friction and shearing, hot water or constrictive clothing  -Relieve pressure over bony prominences using allevyn   -Do not massage red bony areas    Next Steps:  -Teach patient strategies to minimize risks such as weight shifting    -Consider consults to  interdisciplinary teams such as PT  Outcome: Progressing  Goal: Incision(s), wounds(s) or drain site(s) healing without S/S of infection  Description: INTERVENTIONS  - Assess and document dressing, incision, wound bed, drain sites and  surrounding tissue  - Provide patient and family education  Outcome: Progressing  Goal: Pressure injury heals and does not worsen  Description: Interventions:  - Implement low air loss mattress or specialty surface (Criteria met)  - Apply silicone foam dressing  - Apply fecal or urinary incontinence containment device   - Utilize friction reducing device or surface for transfers   - Consider nutrition services referral as needed  Outcome: Progressing     Problem: NEUROSENSORY - ADULT  Goal: Achieves stable or improved neurological status  Description: INTERVENTIONS  - Monitor and report changes in neurological status  - Monitor vital signs such as temperature, blood pressure, glucose, and any other labs ordered   - Initiate measures to prevent increased intracranial pressure  - Monitor for seizure activity and implement precautions if appropriate      Outcome: Not Progressing  Goal: Achieves maximal functionality and self care  Description: INTERVENTIONS  - Monitor swallowing and airway patency with patient fatigue and changes in neurological status  - Encourage and assist patient to increase activity and self care.   - Encourage visually impaired, hearing impaired and aphasic patients to use assistive/communication devices  Outcome: Not Progressing     Problem: CARDIOVASCULAR - ADULT  Goal: Maintains optimal cardiac output and hemodynamic stability  Description: INTERVENTIONS:  - Monitor I/O, vital signs and rhythm  - Monitor for S/S and trends of decreased cardiac output  - Administer and titrate ordered vasoactive medications to optimize hemodynamic stability  - Assess quality of pulses, skin color and temperature  - Assess for signs of decreased coronary artery perfusion  - Instruct patient to report change in severity of symptoms  Outcome: Progressing  Goal: Absence of cardiac dysrhythmias or at baseline rhythm  Description: INTERVENTIONS:  - Continuous cardiac monitoring, vital signs, obtain 12 lead EKG if  ordered  - Administer antiarrhythmic and heart rate control medications as ordered  - Monitor electrolytes and administer replacement therapy as ordered  Outcome: Progressing     Problem: GASTROINTESTINAL - ADULT  Goal: Minimal or absence of nausea and/or vomiting  Description: INTERVENTIONS:  - Administer IV fluids if ordered to ensure adequate hydration  - Maintain NPO status until nausea and vomiting are resolved  - Nasogastric tube if ordered  - Administer ordered antiemetic medications as needed  - Provide nonpharmacologic comfort measures as appropriate  - Advance diet as tolerated, if ordered  - Consider nutrition services referral to assist patient with adequate nutrition and appropriate food choices  Outcome: Progressing  Goal: Maintains or returns to baseline bowel function  Description: INTERVENTIONS:  - Assess bowel function  - Encourage oral fluids to ensure adequate hydration  - Administer IV fluids if ordered to ensure adequate hydration  - Administer ordered medications as needed  - Encourage mobilization and activity  - Consider nutritional services referral to assist patient with adequate nutrition and appropriate food choices  Outcome: Progressing  Goal: Maintains adequate nutritional intake  Description: INTERVENTIONS:  - Monitor percentage of each meal consumed  - Identify factors contributing to decreased intake, treat as appropriate  - Assist with meals as needed  - Monitor I&O, weight, and lab values if indicated  - Obtain nutrition services referral as needed  Outcome: Progressing  Goal: Establish and maintain optimal ostomy function  Description: INTERVENTIONS:  - Assess bowel function  - Encourage oral fluids to ensure adequate hydration  - Administer IV fluids if ordered to ensure adequate hydration   - Administer ordered medications as needed  - Encourage mobilization and activity  - Nutrition services referral to assist patient with appropriate food choices  - Assess stoma site  -  Consider wound care consult   Outcome: Not Progressing  Goal: Oral mucous membranes remain intact  Description: INTERVENTIONS  - Assess oral mucosa and hygiene practices  - Implement preventative oral hygiene regimen  - Implement oral medicated treatments as ordered  - Initiate Nutrition services referral as needed  Outcome: Progressing     Problem: GENITOURINARY - ADULT  Goal: Maintains or returns to baseline urinary function  Description: INTERVENTIONS:  - Assess urinary function  - Encourage oral fluids to ensure adequate hydration if ordered  - Administer IV fluids as ordered to ensure adequate hydration  - Administer ordered medications as needed  - Offer frequent toileting  - Follow urinary retention protocol if ordered  Outcome: Progressing  Goal: Urinary catheter remains patent  Description: INTERVENTIONS:  - Assess patency of urinary catheter  - If patient has a chronic marie, consider changing catheter if non-functioning  - Follow guidelines for intermittent irrigation of non-functioning urinary catheter  Outcome: Progressing     Problem: METABOLIC, FLUID AND ELECTROLYTES - ADULT  Goal: Electrolytes maintained within normal limits  Description: INTERVENTIONS:  - Monitor labs and assess patient for signs and symptoms of electrolyte imbalances  - Administer electrolyte replacement as ordered  - Monitor response to electrolyte replacements, including repeat lab results as appropriate  - Instruct patient on fluid and nutrition as appropriate  Outcome: Progressing  Goal: Fluid balance maintained  Description: INTERVENTIONS:  - Monitor labs   - Monitor I/O and WT  - Instruct patient on fluid and nutrition as appropriate  - Assess for signs & symptoms of volume excess or deficit  Outcome: Progressing  Goal: Glucose maintained within target range  Description: INTERVENTIONS:  - Monitor Blood Glucose as ordered  - Assess for signs and symptoms of hyperglycemia and hypoglycemia  - Administer ordered medications to  maintain glucose within target range  - Assess nutritional intake and initiate nutrition service referral as needed  Outcome: Progressing     Problem: HEMATOLOGIC - ADULT  Goal: Maintains hematologic stability  Description: INTERVENTIONS  - Assess for signs and symptoms of bleeding or hemorrhage  - Monitor labs  - Administer supportive blood products/factors as ordered and appropriate  Outcome: Progressing     Problem: MUSCULOSKELETAL - ADULT  Goal: Maintain or return mobility to safest level of function  Description: INTERVENTIONS:  - Assess patient's ability to carry out ADLs; assess patient's baseline for ADL function and identify physical deficits which impact ability to perform ADLs (bathing, care of mouth/teeth, toileting, grooming, dressing, etc.)  - Assess/evaluate cause of self-care deficits   - Assess range of motion  - Assess patient's mobility  - Assess patient's need for assistive devices and provide as appropriate  - Encourage maximum independence but intervene and supervise when necessary  - Involve family in performance of ADLs  - Assess for home care needs following discharge   - Consider OT consult to assist with ADL evaluation and planning for discharge  - Provide patient education as appropriate  Outcome: Progressing     Problem: COPING  Goal: Pt/Family able to verbalize concerns and demonstrate effective coping strategies  Description: INTERVENTIONS:  - Assist patient/family to identify coping skills, available support systems and cultural and spiritual values  - Provide emotional support, including active listening and acknowledgement of concerns of patient and caregivers  - Reduce environmental stimuli, as able  - Provide patient education  - Assess for spiritual pain/suffering and initiate spiritual care, including notification of Pastoral Care or [place] based community as needed  - Assess effectiveness of coping strategies  Outcome: Progressing  Goal: Will report anxiety at manageable  levels  Description: INTERVENTIONS:  - Administer medication as ordered  - Teach and encourage coping skills  - Provide emotional support  - Assess patient/family for anxiety and ability to cope  Outcome: Progressing     Problem: BEHAVIOR  Goal: Pt/Family maintain appropriate behavior and adhere to behavioral management agreement, if implemented  Description: INTERVENTIONS:  - Assess the family dynamic   - Encourage verbalization of thoughts and concerns in a socially appropriate manner  - Assess patient/family's coping skills and non-compliant behavior (including use of illegal substances).  - Utilize positive, consistent limit setting strategies supporting safety of patient, staff and others  - Initiate consult with Case Management, Spiritual Care or other ancillary services as appropriate  - If a patient's/visitor's behavior jeopardizes the safety of the patient, staff, or others, refer to organization procedure.   - Notify Security of behavior or suspected illegal substances which indicate the need for search of the patient and/or belongings  - Encourage participation in the decision making process about a behavioral management agreement; implement if patient meets criteria  Outcome: Progressing     Problem: Potential for Falls  Goal: Patient will remain free of falls  Description: INTERVENTIONS:  - Educate patient/family on patient safety including physical limitations  - Instruct patient to call for assistance with activity   - Consult OT/PT to assist with strengthening/mobility   - Keep Call bell within reach  - Keep bed low and locked with side rails adjusted as appropriate  - Keep care items and personal belongings within reach  - Initiate and maintain comfort rounds  - Make Fall Risk Sign visible to staff  - Offer Toileting every 2 Hours, in advance of need  - Initiate/Maintain bed alarm  - Obtain necessary fall risk management equipment: non skid footwear  - Apply yellow socks and bracelet for high fall  risk patients  - Consider moving patient to room near nurses station  Outcome: Progressing

## 2024-03-18 NOTE — UTILIZATION REVIEW
Continued Stay Review    Date: 03/16, 03/17, 03/18                          Current Patient Class: IP  Current Level of Care: Level 2 stepdown?HOT    HPI:59 y.o. female initially admitted on 01/10     Assessment/Plan:   03/16 Pts MS improving, not fully ff commands. Required fentanyl 50 mcg PRN x 3 at 1500, midnight, and 0530 overnight. Switched from Pressure support to pressure control 16/12/6/40% due to apnea at nighttime. Cont MAP goal>65. Cont Bactrim. On IV steroids 50 mg Q6hrs will decrease 3/17/2024 to 40 mg QID, remains on on PSV during the day and PC/AC at night. Cont TF. Cont wound VAC/wound care managed by surgery. Mon renal function, mon UOP. Cont Insulin gtt.     03/17 Patient with improving mental status though is still not fully following commands. Remains off sedation, tolerated PSV during the day and PCV/AC overnight for comfort after brief apneic episode. Switched back to PSV overnight and tolerated well. Cr remains elevated but stable at 1.6. CRP trending back up at 78.9.  Tachycardic. Cont to mon MAPS goal>65. Started on Lopressor today. Decreased IV Steroids to 40 mg QID today. Cont Bactrim.   Remains on on PSV during the day we will perform bronchoscopy today for pulmonary toilet and cleaning up of mucous plugging some peripheral mucous impactions. Cont TF,. On Wound Vac, cont wound dressing. Cont Insulin gtt.      03/18 Pt remained sinus tachy 130s on 3/17 despite ativan/fent. Was trailed lopressor 25 with some improvement, repeated echo with EF 70% but vigorous systolic function, no WMA. S/P bronch 3/17 due to increased secretions. Overnight, MAPs in 60s. Received 500cc isolyte bolus. Stoma noted to be recessing into wound vac.- Gen surg to evaluate. Hgb 5.6 this AM, repeat 5.3. No overt s/s acute GIB. S/P 2U irradiated PRBCs.  Keep off sedation. Repeat hemolysis labs, check retic count, check haptoglobin. Repeat folate. Nephro consulted for CT chest, abdomen, pelvis with IV and PO contrast.  Check tracheal aspirate for B-D glucan. Check immunoglobulins. Will check for anti Rituximab antibodies to assess for hypersensitivity pneumonitis. Repeat differential with CBC. Blood flow cytometry. Check ammonia level. Cont nebs. Check am cortisol. Continue tube feeds. Continue lamictal and topomax. Discontinue zyprexa. Continue insulin drip. Continue steroid taper    Nephrology Consult: ELIESER: Creatinine has continued to rise again etiology multifactorial given likely component of ELIESER plus Bactrim use. Cont to mon renal function closely. anticipating transition of Bactrim to minocycline. Proceeding with noncontrast CT. If CT absolutely needed with IV contrast would recommend IV fluids to be given 4 to 6 hours pre and post CT. (Isolyte at 100 cc/h for 4 to 6 hours pre and post)    Vital Signs:                 Date/Time Temp Pulse Resp BP MAP (mmHg) Arterial Line BP MAP SpO2 FiO2 (%) O2 Device Patient Position - Orthostatic VS   03/18/24 1600 98.7 °F (37.1 °C) 130 Abnormal  23 Abnormal  125/77 94 -- -- 97 % -- -- --   03/18/24 1500 -- 133 Abnormal  23 Abnormal  122/74 92 -- -- 97 % -- -- --   03/18/24 1400 -- 124 Abnormal  22 118/70 87 -- -- 97 % -- -- --   03/18/24 1300 -- 122 Abnormal  20 116/72 87 -- -- 97 % -- -- --   03/18/24 1230 97.6 °F (36.4 °C) 120 Abnormal  22 125/72 -- -- -- -- -- -- --   03/18/24 1207 -- -- -- -- -- -- -- 98 % -- -- --   03/18/24 1200 -- 118 Abnormal  21 122/75 93 136/51 76 mmHg 98 % -- -- --   03/18/24 1100 -- 115 Abnormal  22 127/71 94 140/52 78 mmHg 98 % -- -- --   03/18/24 1026 98.2 °F (36.8 °C) 115 Abnormal  21 136/80 -- 146/55 82 mmHg 98 % -- -- --   03/18/24 1013 -- 118 Abnormal  20 -- -- 151/56 84 mmHg 98 % -- -- --   03/18/24 1008 98.2 °F (36.8 °C) 124 Abnormal  21 133/81 -- -- -- -- -- -- --   03/18/24 1000 -- 123 Abnormal  23 Abnormal  133/81 102 151/60 87 mmHg 99 % -- -- --   03/18/24 0956 98.1 °F (36.7 °C) 119 Abnormal  24 Abnormal  -- -- 149/56 84 mmHg 98 % -- -- --    03/18/24 0900 -- 119 Abnormal  37 Abnormal  109/83 92 159/63 95 mmHg 98 % -- -- --   03/18/24 0800 97.9 °F (36.6 °C) -- 18 107/64 79 146/57 80 mmHg 98 % 40 Ventilator --   03/18/24 0757 -- -- -- -- -- -- -- 98 % -- -- --   03/18/24 0746 97.9 °F (36.6 °C) 96 22 89/51 Abnormal  -- -- -- -- -- -- --   03/18/24 0745 97.9 °F (36.6 °C) 95 19 -- -- 120/49 67 mmHg 97 % -- -- --   03/18/24 0700 -- 95 18 95/52 69 123/50 69 mmHg 98 % -- -- --   03/18/24 0600 -- 94 19 87/51 Abnormal  64 Abnormal  124/50 68 mmHg 96 % -- -- --   03/18/24 0500 -- 96 19 87/53 Abnormal  65 113/47 64 mmHg Abnormal  95 % -- -- --   03/18/24 0400 98.9 °F (37.2 °C) 102 21 111/64 82 149/58 82 mmHg 98 % 40 Ventilator Lying   03/18/24 0300 -- 98 17 92/50 65 119/45 65 mmHg 97 % -- -- --   03/18/24 0200 -- 96 16 85/53 Abnormal  64 Abnormal  108/43 61 mmHg Abnormal  97 % -- -- --   03/18/24 0100 -- 94 16 82/52 Abnormal  62 Abnormal  104/43 59 mmHg Abnormal  97 % -- -- --   03/18/24 0000 97.9 °F (36.6 °C) 97 17 95/53 68 121/47 68 mmHg 100 % 40 Ventilator Lying   03/17/24 2300 -- 94 17 82/51 Abnormal  61 Abnormal  105/43 59 mmHg Abnormal  97 % -- -- --   03/17/24 2200 -- 99 18 96/50 70 123/50 70 mmHg 97 % -- -- --   03/17/24 2109 -- 114 Abnormal  -- 154/66 -- -- -- -- -- -- --   03/17/24 2100 -- 106 Abnormal  17 115/66 85 156/61 87 mmHg 97 % -- -- --   03/17/24 2000 98.6 °F (37 °C) 103 17 110/60 78 137/51 74 mmHg 97 % -- Ventilator Lying   03/17/24 1900 -- 107 Abnormal  20 117/68 87 142/55 80 mmHg 96 % -- -- --   03/17/24 1800 -- 114 Abnormal  37 Abnormal  116/58 83 139/59 84 mmHg 99 % -- -- --   03/17/24 1700 -- 96 17 89/53 Abnormal  68 116/45 64 mmHg Abnormal  96 % -- -- --   03/17/24 1646 -- -- -- -- -- -- -- -- 40 Ventilator --   03/17/24 1600 98.3 °F (36.8 °C) 96 17 78/39 Abnormal  54 Abnormal  103/43 60 mmHg Abnormal  96 % -- -- --   03/17/24 1500 -- 97 15 88/52 Abnormal  66 109/45 64 mmHg Abnormal  94 % -- -- --   03/17/24 1415 -- 104 -- 127/73 -- --  -- -- -- -- --   03/17/24 1400 -- 89 12 104/52 74 116/48 68 mmHg 93 % -- -- --   03/17/24 1200 99 °F (37.2 °C) 117 Abnormal  21 127/73 95 144/65 89 mmHg 95 % -- -- --   03/17/24 1125 -- -- -- -- -- -- -- -- 40 Ventilator --   03/17/24 1114 -- 122 Abnormal  -- 134/73 -- -- -- -- -- -- --   03/17/24 1000 -- 138 Abnormal  29 Abnormal  151/70 101 142/68 92 mmHg 91 % -- -- --   03/17/24 0926 -- 133 Abnormal  -- 140/69 -- -- -- -- -- -- --   03/17/24 0800 98.8 °F (37.1 °C) 133 Abnormal  28 Abnormal  143/73 101 147/69 93 mmHg 91 % -- Ventilator --   03/17/24 0714 -- -- -- -- -- -- -- -- 40 Ventilator --   03/17/24 0600 -- 130 Abnormal  27 Abnormal  140/79 101 140/70 91 mmHg 92 % -- -- --   03/17/24 0500 -- 114 Abnormal  31 Abnormal  144/75 102 177/72 98 mmHg 98 % -- -- --   03/17/24 0400 98.1 °F (36.7 °C)  119 Abnormal  18 117/61 83 136/57 76 mmHg 95 % 40 Ventilator --   Temp: Simultaneous filing. User may be unaware of other data. at 03/17/24 0400   03/17/24 0300 -- 110 Abnormal  12 107/59 78 119/51 67 mmHg 96 % -- -- --   03/17/24 0200 -- 114 Abnormal  16 103/57 76 115/44 63 mmHg Abnormal  96 % -- -- --   03/17/24 0100 -- 116 Abnormal  15 104/57 74 111/42 61 mmHg Abnormal  96 % -- -- --   03/17/24 0000 98.3 °F (36.8 °C) 129 Abnormal  20 106/64 77 116/53 73 mmHg 95 % -- -- --   03/16/24 2300 -- 121 Abnormal  17 -- -- 95/40 57 mmHg Abnormal  93 % -- -- --   03/16/24 2200 -- 133 Abnormal  21 -- -- 154/64 89 mmHg 96 % -- -- --   03/16/24 2100 -- 114 Abnormal  21 -- -- 138/48 72 mmHg 92 % -- -- --   03/16/24 2000 97.5 °F (36.4 °C) 108 Abnormal  17 -- -- 125/45 68 mmHg 93 % 40 Ventilator --   03/16/24 1937 -- -- -- -- -- -- -- 93 % -- -- --   03/16/24 1800 -- 111 Abnormal  17 -- -- 114/47 67 mmHg 92 % -- -- --   03/16/24 1600 98.2 °F (36.8 °C) 118 Abnormal  23 Abnormal  -- -- 131/58 79 mmHg 92 % -- -- --   03/16/24 1400 -- 111 Abnormal  17 -- -- 139/55 81 mmHg 95 % -- -- --   03/16/24 1200 98 °F (36.7 °C) 115 Abnormal  15 --  -- 117/50 70 mmHg 94 % -- Ventilator --   03/16/24 1100 -- 134 Abnormal  38 Abnormal  -- -- 170/70 100 mmHg 93 % -- -- --   03/16/24 1000 -- 117 Abnormal  18 -- -- 159/61 89 mmHg 95 % -- -- --   03/16/24 0900 -- 116 Abnormal  17 -- -- 139/56 79 mmHg 94 % -- Ventilator --   03/16/24 0800 -- 115 Abnormal  19 -- -- 139/56 80 mmHg 94 % -- -- --   03/16/24 0700 -- 118 Abnormal  20 -- -- 159/64 92 mmHg 94 % -- -- --   03/16/24 0600 -- 106 Abnormal  20 -- -- 118/54 74 mmHg 92 % -- --        Pertinent Labs/Diagnostic Results:   03/17 ECHO:    Left Ventricle: The left ventricular ejection fraction is 70%. Systolic function is vigorous. Global longitudinal strain is reduced at -14%. Wall motion is normal.    Aortic Valve: There is mild regurgitation.    Aorta: The aortic root is mildly dilated. The ascending aorta is mildly dilated. The aortic root is 4.10 cm. The ascending aorta is 4.1 cm.        Results from last 7 days   Lab Units 03/18/24  1620 03/18/24  0533 03/18/24  0429 03/17/24  0800 03/17/24  0451 03/16/24  0611 03/13/24  0210 03/12/24  0758   WBC Thousand/uL  --   --  8.39  8.39  --  11.78* 9.47   < > 9.71   HEMOGLOBIN g/dL 9.9* 5.3* 5.6*  5.6* 7.5* 6.8* 7.4*   < > 7.7*   HEMATOCRIT % 29.4* 16.3* 17.6*  17.6*  --  21.5* 22.2*   < > 23.6*   PLATELETS Thousands/uL  --   --  89*  89*  --  124* 139*   < > 179   BANDS PCT %  --   --  5  --   --   --   --  14*    < > = values in this interval not displayed.     Results from last 7 days   Lab Units 03/18/24  0429 03/12/24  0401   RETIC CT ABS  87,700 94,200   RETIC CT PCT % 4.90* 5.04*     Results from last 7 days   Lab Units 03/18/24  0429 03/17/24  0451 03/16/24  0611 03/15/24  1700 03/15/24  0603 03/14/24  0542 03/13/24  0449   SODIUM mmol/L 135 137 137  --  138 145 144   POTASSIUM mmol/L 4.1 4.3 3.9  --  3.3* 3.2* 4.0   CHLORIDE mmol/L 107 105 107  --  109* 112* 114*   CO2 mmol/L 18* 19* 18*  --  18* 19* 19*   ANION GAP mmol/L 10 13 12  --  11 14* 11   BUN mg/dL  "81* 71* 65*  --  61* 59* 53*   CREATININE mg/dL 1.89* 1.65* 1.65*  --  1.59* 1.55* 1.28   EGFR ml/min/1.73sq m 28 33 33  --  35 36 46   CALCIUM mg/dL 8.0* 8.2* 8.2*  --  7.6* 7.7* 7.7*   CALCIUM, IONIZED mmol/L 1.20  --   --  1.14  --  1.12 1.12   MAGNESIUM mg/dL 2.3 2.2 2.1  --  1.9 2.0 2.0   PHOSPHORUS mg/dL 4.0 3.6 3.6  --  3.8 4.0  4.0 3.9     Results from last 7 days   Lab Units 03/18/24  1214   AMMONIA umol/L 51     Results from last 7 days   Lab Units 03/18/24  1715 03/18/24  1619 03/18/24  1333 03/18/24  1128 03/18/24  0955 03/18/24  0752 03/18/24  0607 03/18/24  0354 03/18/24  0208 03/18/24  0018 03/17/24  2211 03/17/24  2019   POC GLUCOSE mg/dl 206* 189* 177* 179* 165* 144* 136 158* 182* 170* 170* 151*     Results from last 7 days   Lab Units 03/18/24  0429 03/17/24  0451 03/16/24  0611 03/15/24  0603 03/14/24  0542 03/13/24  0449 03/12/24  1233 03/12/24  0401   GLUCOSE RANDOM mg/dL 139 143* 110 133 141* 123 106 128             No results found for: \"BETA-HYDROXYBUTYRATE\"   Results from last 7 days   Lab Units 03/17/24  0812 03/14/24  1840 03/14/24  1124   PH ART  7.375 7.330* 7.346*   PCO2 ART mm Hg 32.1* 36.3 34.8*   PO2 ART mm Hg 64.4* 90.7 73.4*   HCO3 ART mmol/L 18.4* 18.7* 18.6*   BASE EXC ART mmol/L -6.1 -6.6 -6.4   O2 CONTENT ART mL/dL 10.5* 11.2* 10.9*   O2 HGB, ARTERIAL % 90.3* 95.6 93.6*   ABG SOURCE  Line, Arterial Line, Arterial Line, Arterial                     Results from last 7 days   Lab Units 03/12/24  0446   D-DIMER QUANTITATIVE ug/ml FEU 0.54*     Results from last 7 days   Lab Units 03/12/24  0446 03/12/24  0431   PROTIME seconds  --  16.8*   INR   --  1.39*   PTT seconds 33  --              Results from last 7 days   Lab Units 03/17/24  1604   LACTIC ACID mmol/L 1.6                         Results from last 7 days   Lab Units 03/18/24  1003 03/13/24  0555   UNIT PRODUCT CODE  Z8212G09  [id number]  A1890T12   UNIT NUMBER  [id number]  [id number]  " [id number]   UNITABO  A  A O  A   UNITRH  NEG  NEG NEG  NEG   CROSSMATCH  Compatible  Compatible Compatible  Compatible   UNIT DISPENSE STATUS  Issued  Issued Presumed Trans  Presumed Trans   UNIT PRODUCT VOL mL 350  350 350  350             Results from last 7 days   Lab Units 03/18/24  0429 03/17/24  0451 03/16/24  0611 03/15/24  0603 03/14/24  0542   CRP mg/L 38.2* 78.9* 13.4* 18.6* 32.3*             Results from last 7 days   Lab Units 03/17/24  1753   SODIUM UR  40                 Results from last 7 days   Lab Units 03/14/24  0542 03/13/24  0449 03/12/24  0441   VANCOMYCIN RM ug/mL 23.7* 27.9* 31.0*       Medications:   Scheduled Medications:  chlorhexidine, 15 mL, Mouth/Throat, Q12H SARTHAK  heparin (porcine), 5,000 Units, Subcutaneous, Q8H SARTHAK  ipratropium, 0.5 mg, Nebulization, TID  lamoTRIgine, 150 mg, Oral, BID  levalbuterol, 1.25 mg, Nebulization, TID  lidocaine (PF), 10 mL, Inhalation, Once  methylPREDNISolone sodium succinate, 40 mg, Intravenous, Q6H SARTHAK   Followed by  [START ON 3/20/2024] methylPREDNISolone sodium succinate, 30 mg, Intravenous, Q6H SARTHAK   Followed by  [START ON 3/23/2024] methylPREDNISolone sodium succinate, 20 mg, Intravenous, Q6H SARTHAK   Followed by  [START ON 3/26/2024] methylPREDNISolone sodium succinate, 10 mg, Intravenous, Q6H SARTHAK   Followed by  [START ON 3/29/2024] methylPREDNISolone sodium succinate, 10 mg, Intravenous, Q8H SARTHAK   Followed by  [START ON 4/1/2024] methylPREDNISolone sodium succinate, 10 mg, Intravenous, Q12H SARTHAK   Followed by  [START ON 4/8/2024] methylPREDNISolone sodium succinate, 10 mg, Intravenous, Daily  minocycline, 100 mg, Per NG Tube, Q12H SARTHAK  [START ON 3/19/2024] modafinil, 100 mg, Per NG Tube, Daily  multi-electrolyte, 500 mL, Intravenous, Once  nystatin, , Topical, BID  polyethylene glycol, 17 g, Per NG Tube, Daily  sodium chloride, 2 spray, Each Nare, Q4H  sodium chloride, 4 mL, Nebulization, TID  topiramate, 50 mg, Per PEG Tube, BID  [START  ON 3/19/2024] venlafaxine, 12.5 mg, Oral, TID With Meals      Continuous IV Infusions:  insulin regular (HumuLIN R,NovoLIN R) 1 Units/mL in sodium chloride 0.9 % 100 mL infusion, 0.3-21 Units/hr, Intravenous, Titrated      PRN Meds:  acetaminophen, 650 mg, Oral, Q6H PRN  fentaNYL, 50 mcg, Intravenous, Q1H PRN  LORazepam, 0.5 mg, Intravenous, Q6H PRN  ondansetron, 4 mg, Intravenous, Q6H PRN  sodium chloride, 1 Application, Nasal, Q1H PRN        Discharge Plan: TBD    Network Utilization Review Department  ATTENTION: Please call with any questions or concerns to [phone] and carefully listen to the prompts so that you are directed to the right person. All voicemails are confidential.   For Discharge needs, contact Care Management DC Support Team at [phone] opt. 2  Send all requests for admission clinical reviews, approved or denied determinations and any other requests to dedicated fax number below belonging to the [facility] where the patient is receiving treatment. List of dedicated fax numbers for the Facilities:  FACILITY NAME UR FAX NUMBER   ADMISSION DENIALS (Administrative/Medical Necessity) [phone]   DISCHARGE SUPPORT TEAM (NETWORK) [phone]   PARENT CHILD HEALTH (Maternity/NICU/Pediatrics) [phone]   [facility] [phone]   [facility] [phone]   [facility] [phone]   [facility] [phone]   [facility] [phone]   [facility] [phone]   [facility] [phone]   [facility] [phone]   [facility] [phone]   [facility] [phone]   [facility] [phone]   [facility] [phone]

## 2024-03-18 NOTE — ASSESSMENT & PLAN NOTE
Patient was re-intubated 3/11 with subsequent bedside trach on 3/12.   Very careful steroid management and work-up per critical care team.

## 2024-03-19 LAB
ABO GROUP BLD BPU: NORMAL
ABO GROUP BLD BPU: NORMAL
ALBUMIN SERPL BCP-MCNC: 2.5 G/DL (ref 3.5–5)
ALP SERPL-CCNC: 141 U/L (ref 34–104)
ALT SERPL W P-5'-P-CCNC: 64 U/L (ref 7–52)
ANION GAP SERPL CALCULATED.3IONS-SCNC: 14 MMOL/L (ref 4–13)
ANISOCYTOSIS BLD QL SMEAR: PRESENT
AST SERPL W P-5'-P-CCNC: 29 U/L (ref 13–39)
ATRIAL RATE: 132 BPM
BASE EX.OXY STD BLDV CALC-SCNC: 81.7 % (ref 60–80)
BASE EXCESS BLDV CALC-SCNC: -9.8 MMOL/L
BASOPHILS # BLD MANUAL: 0 THOUSAND/UL (ref 0–0.1)
BASOPHILS NFR MAR MANUAL: 0 % (ref 0–1)
BILIRUB SERPL-MCNC: 0.47 MG/DL (ref 0.2–1)
BPU ID: NORMAL
BPU ID: NORMAL
BUN SERPL-MCNC: 85 MG/DL (ref 5–25)
CALCIUM ALBUM COR SERPL-MCNC: 9.6 MG/DL (ref 8.3–10.1)
CALCIUM SERPL-MCNC: 8.4 MG/DL (ref 8.4–10.2)
CHLORIDE SERPL-SCNC: 107 MMOL/L (ref 96–108)
CK SERPL-CCNC: 47 U/L (ref 26–192)
CO2 SERPL-SCNC: 17 MMOL/L (ref 21–32)
CORTIS AM PEAK SERPL-MCNC: 5.4 UG/DL (ref 6.7–22.6)
CREAT SERPL-MCNC: 1.79 MG/DL (ref 0.6–1.3)
CROSSMATCH: NORMAL
CROSSMATCH: NORMAL
CRP SERPL QL: 41.7 MG/L
EOSINOPHIL # BLD MANUAL: 0 THOUSAND/UL (ref 0–0.4)
EOSINOPHIL NFR BLD MANUAL: 0 % (ref 0–6)
ERYTHROCYTE [DISTWIDTH] IN BLOOD BY AUTOMATED COUNT: 19.4 % (ref 11.6–15.1)
GFR SERPL CREATININE-BSD FRML MDRD: 30 ML/MIN/1.73SQ M
GLUCOSE SERPL-MCNC: 100 MG/DL (ref 65–140)
GLUCOSE SERPL-MCNC: 109 MG/DL (ref 65–140)
GLUCOSE SERPL-MCNC: 128 MG/DL (ref 65–140)
GLUCOSE SERPL-MCNC: 147 MG/DL (ref 65–140)
GLUCOSE SERPL-MCNC: 158 MG/DL (ref 65–140)
GLUCOSE SERPL-MCNC: 176 MG/DL (ref 65–140)
GLUCOSE SERPL-MCNC: 178 MG/DL (ref 65–140)
GLUCOSE SERPL-MCNC: 207 MG/DL (ref 65–140)
GLUCOSE SERPL-MCNC: 215 MG/DL (ref 65–140)
GLUCOSE SERPL-MCNC: 224 MG/DL (ref 65–140)
GLUCOSE SERPL-MCNC: 80 MG/DL (ref 65–140)
GLUCOSE SERPL-MCNC: 92 MG/DL (ref 65–140)
HAPTOGLOB SERPL-MCNC: 279 MG/DL (ref 33–346)
HCO3 BLDV-SCNC: 16.9 MMOL/L (ref 24–30)
HCT VFR BLD AUTO: 28.1 % (ref 34.8–46.1)
HGB BLD-MCNC: 9.5 G/DL (ref 11.5–15.4)
LYMPHOCYTES # BLD AUTO: 0 % (ref 14–44)
LYMPHOCYTES # BLD AUTO: 0 THOUSAND/UL (ref 0.6–4.47)
MAGNESIUM SERPL-MCNC: 2.3 MG/DL (ref 1.9–2.7)
MCH RBC QN AUTO: 30.4 PG (ref 26.8–34.3)
MCHC RBC AUTO-ENTMCNC: 33.8 G/DL (ref 31.4–37.4)
MCV RBC AUTO: 90 FL (ref 82–98)
METAMYELOCYTES NFR BLD MANUAL: 1 % (ref 0–1)
MONOCYTES # BLD AUTO: 0.1 THOUSAND/UL (ref 0–1.22)
MONOCYTES NFR BLD: 1 % (ref 4–12)
MYCOBACTERIUM SPEC CULT: NORMAL
NEUTROPHILS # BLD MANUAL: 9.72 THOUSAND/UL (ref 1.85–7.62)
NEUTS BAND NFR BLD MANUAL: 7 % (ref 0–8)
NEUTS SEG NFR BLD AUTO: 91 % (ref 43–75)
NRBC BLD AUTO-RTO: 5 /100 WBC (ref 0–2)
NRBC BLD AUTO-RTO: 5 /100 WBCS
O2 CT BLDV-SCNC: 11.5 ML/DL
P AXIS: 67 DEGREES
PCO2 BLDV: 40.2 MM HG (ref 42–50)
PH BLDV: 7.24 [PH] (ref 7.3–7.4)
PHOSPHATE SERPL-MCNC: 4 MG/DL (ref 2.7–4.5)
PLATELET # BLD AUTO: 86 THOUSANDS/UL (ref 149–390)
PLATELET BLD QL SMEAR: ABNORMAL
PMV BLD AUTO: 13.4 FL (ref 8.9–12.7)
PO2 BLDV: 51 MM HG (ref 35–45)
POTASSIUM SERPL-SCNC: 4.7 MMOL/L (ref 3.5–5.3)
PR INTERVAL: 140 MS
PROT SERPL-MCNC: 4.6 G/DL (ref 6.4–8.4)
PS VENT FIO2: 40
PS VENT PEEP: 6
QRS AXIS: 28 DEGREES
QRSD INTERVAL: 66 MS
QT INTERVAL: 280 MS
QTC INTERVAL: 414 MS
RBC # BLD AUTO: 3.12 MILLION/UL (ref 3.81–5.12)
RBC MORPH BLD: PRESENT
RHODAMINE-AURAMINE STN SPEC: NORMAL
SODIUM SERPL-SCNC: 138 MMOL/L (ref 135–147)
T WAVE AXIS: 69 DEGREES
UNIT DISPENSE STATUS: NORMAL
UNIT DISPENSE STATUS: NORMAL
UNIT PRODUCT CODE: NORMAL
UNIT PRODUCT CODE: NORMAL
UNIT PRODUCT VOLUME: 350 ML
UNIT PRODUCT VOLUME: 350 ML
UNIT RH: NORMAL
UNIT RH: NORMAL
VENT - PS: ABNORMAL
VENT- AC: AC
VENTRICULAR RATE: 132 BPM
WBC # BLD AUTO: 9.92 THOUSAND/UL (ref 4.31–10.16)

## 2024-03-19 PROCEDURE — 97110 THERAPEUTIC EXERCISES: CPT

## 2024-03-19 PROCEDURE — 82533 TOTAL CORTISOL: CPT

## 2024-03-19 PROCEDURE — 80053 COMPREHEN METABOLIC PANEL: CPT

## 2024-03-19 PROCEDURE — 99232 SBSQ HOSP IP/OBS MODERATE 35: CPT | Performed by: INTERNAL MEDICINE

## 2024-03-19 PROCEDURE — 82550 ASSAY OF CK (CPK): CPT

## 2024-03-19 PROCEDURE — 82805 BLOOD GASES W/O2 SATURATION: CPT

## 2024-03-19 PROCEDURE — 88184 FLOWCYTOMETRY/ TC 1 MARKER: CPT

## 2024-03-19 PROCEDURE — 85007 BL SMEAR W/DIFF WBC COUNT: CPT

## 2024-03-19 PROCEDURE — 86140 C-REACTIVE PROTEIN: CPT | Performed by: STUDENT IN AN ORGANIZED HEALTH CARE EDUCATION/TRAINING PROGRAM

## 2024-03-19 PROCEDURE — 99232 SBSQ HOSP IP/OBS MODERATE 35: CPT | Performed by: PHYSICIAN ASSISTANT

## 2024-03-19 PROCEDURE — 93010 ELECTROCARDIOGRAM REPORT: CPT | Performed by: INTERNAL MEDICINE

## 2024-03-19 PROCEDURE — 83735 ASSAY OF MAGNESIUM: CPT | Performed by: STUDENT IN AN ORGANIZED HEALTH CARE EDUCATION/TRAINING PROGRAM

## 2024-03-19 PROCEDURE — 94003 VENT MGMT INPAT SUBQ DAY: CPT

## 2024-03-19 PROCEDURE — 94664 DEMO&/EVAL PT USE INHALER: CPT

## 2024-03-19 PROCEDURE — 99255 IP/OBS CONSLTJ NEW/EST HI 80: CPT | Performed by: INTERNAL MEDICINE

## 2024-03-19 PROCEDURE — 76604 US EXAM CHEST: CPT | Performed by: INTERNAL MEDICINE

## 2024-03-19 PROCEDURE — C9113 INJ PANTOPRAZOLE SODIUM, VIA: HCPCS

## 2024-03-19 PROCEDURE — 94760 N-INVAS EAR/PLS OXIMETRY 1: CPT

## 2024-03-19 PROCEDURE — 82948 REAGENT STRIP/BLOOD GLUCOSE: CPT

## 2024-03-19 PROCEDURE — 86361 T CELL ABSOLUTE COUNT: CPT

## 2024-03-19 PROCEDURE — 99233 SBSQ HOSP IP/OBS HIGH 50: CPT | Performed by: STUDENT IN AN ORGANIZED HEALTH CARE EDUCATION/TRAINING PROGRAM

## 2024-03-19 PROCEDURE — 97535 SELF CARE MNGMENT TRAINING: CPT

## 2024-03-19 PROCEDURE — 85027 COMPLETE CBC AUTOMATED: CPT

## 2024-03-19 PROCEDURE — 84100 ASSAY OF PHOSPHORUS: CPT

## 2024-03-19 PROCEDURE — 88185 FLOWCYTOMETRY/TC ADD-ON: CPT

## 2024-03-19 PROCEDURE — 97112 NEUROMUSCULAR REEDUCATION: CPT

## 2024-03-19 PROCEDURE — 94640 AIRWAY INHALATION TREATMENT: CPT

## 2024-03-19 RX ORDER — PANTOPRAZOLE SODIUM 40 MG/10ML
40 INJECTION, POWDER, LYOPHILIZED, FOR SOLUTION INTRAVENOUS EVERY 12 HOURS SCHEDULED
Status: DISCONTINUED | OUTPATIENT
Start: 2024-03-19 | End: 2024-04-09

## 2024-03-19 RX ORDER — MINOCYCLINE HYDROCHLORIDE 50 MG/1
200 TABLET ORAL 2 TIMES DAILY
Status: DISCONTINUED | OUTPATIENT
Start: 2024-03-19 | End: 2024-03-19

## 2024-03-19 RX ORDER — MINOCYCLINE HYDROCHLORIDE 100 MG/1
200 CAPSULE ORAL 2 TIMES DAILY
Status: DISCONTINUED | OUTPATIENT
Start: 2024-03-19 | End: 2024-03-20

## 2024-03-19 RX ORDER — SODIUM CHLORIDE, SODIUM GLUCONATE, SODIUM ACETATE, POTASSIUM CHLORIDE, MAGNESIUM CHLORIDE, SODIUM PHOSPHATE, DIBASIC, AND POTASSIUM PHOSPHATE .53; .5; .37; .037; .03; .012; .00082 G/100ML; G/100ML; G/100ML; G/100ML; G/100ML; G/100ML; G/100ML
1000 INJECTION, SOLUTION INTRAVENOUS ONCE
Status: COMPLETED | OUTPATIENT
Start: 2024-03-19 | End: 2024-03-19

## 2024-03-19 RX ORDER — MINOCYCLINE HYDROCHLORIDE 50 MG/1
100 TABLET ORAL 2 TIMES DAILY
Status: DISCONTINUED | OUTPATIENT
Start: 2024-03-19 | End: 2024-03-19

## 2024-03-19 RX ADMIN — LEVALBUTEROL HYDROCHLORIDE 1.25 MG: 1.25 SOLUTION RESPIRATORY (INHALATION) at 07:41

## 2024-03-19 RX ADMIN — TOPIRAMATE 50 MG: 100 TABLET, FILM COATED ORAL at 09:35

## 2024-03-19 RX ADMIN — LEVALBUTEROL HYDROCHLORIDE 1.25 MG: 1.25 SOLUTION RESPIRATORY (INHALATION) at 19:17

## 2024-03-19 RX ADMIN — NYSTATIN: 100000 POWDER TOPICAL at 09:00

## 2024-03-19 RX ADMIN — MINOCYCLINE HYDROCHLORIDE 200 MG: 50 TABLET, FILM COATED ORAL at 10:53

## 2024-03-19 RX ADMIN — NYSTATIN: 100000 POWDER TOPICAL at 18:00

## 2024-03-19 RX ADMIN — LAMOTRIGINE 150 MG: 100 TABLET ORAL at 17:56

## 2024-03-19 RX ADMIN — LEVALBUTEROL HYDROCHLORIDE 1.25 MG: 1.25 SOLUTION RESPIRATORY (INHALATION) at 13:53

## 2024-03-19 RX ADMIN — LAMOTRIGINE 150 MG: 100 TABLET ORAL at 08:56

## 2024-03-19 RX ADMIN — HEPARIN SODIUM 5000 UNITS: 5000 INJECTION INTRAVENOUS; SUBCUTANEOUS at 14:59

## 2024-03-19 RX ADMIN — Medication 2 SPRAY: at 21:48

## 2024-03-19 RX ADMIN — SODIUM CHLORIDE 8 UNITS/HR: 9 INJECTION, SOLUTION INTRAVENOUS at 12:10

## 2024-03-19 RX ADMIN — MINOCYCLINE HYDROCHLORIDE 200 MG: 100 CAPSULE ORAL at 21:40

## 2024-03-19 RX ADMIN — IPRATROPIUM BROMIDE 0.5 MG: 0.5 SOLUTION RESPIRATORY (INHALATION) at 07:41

## 2024-03-19 RX ADMIN — Medication 2 SPRAY: at 05:00

## 2024-03-19 RX ADMIN — SODIUM CHLORIDE, SODIUM GLUCONATE, SODIUM ACETATE, POTASSIUM CHLORIDE, MAGNESIUM CHLORIDE, SODIUM PHOSPHATE, DIBASIC, AND POTASSIUM PHOSPHATE 1000 ML: .53; .5; .37; .037; .03; .012; .00082 INJECTION, SOLUTION INTRAVENOUS at 09:31

## 2024-03-19 RX ADMIN — HEPARIN SODIUM 5000 UNITS: 5000 INJECTION INTRAVENOUS; SUBCUTANEOUS at 05:02

## 2024-03-19 RX ADMIN — VENLAFAXINE 12.5 MG: 25 TABLET ORAL at 17:09

## 2024-03-19 RX ADMIN — Medication 2 SPRAY: at 02:23

## 2024-03-19 RX ADMIN — SODIUM CHLORIDE SOLN NEBU 3% 4 ML: 3 NEBU SOLN at 19:17

## 2024-03-19 RX ADMIN — Medication 2 SPRAY: at 15:14

## 2024-03-19 RX ADMIN — POLYETHYLENE GLYCOL 3350 17 G: 17 POWDER, FOR SOLUTION ORAL at 09:09

## 2024-03-19 RX ADMIN — METHYLPREDNISOLONE SODIUM SUCCINATE 40 MG: 40 INJECTION, POWDER, FOR SOLUTION INTRAMUSCULAR; INTRAVENOUS at 00:16

## 2024-03-19 RX ADMIN — MODAFINIL 100 MG: 100 TABLET ORAL at 08:56

## 2024-03-19 RX ADMIN — Medication 2 SPRAY: at 17:59

## 2024-03-19 RX ADMIN — METHYLPREDNISOLONE SODIUM SUCCINATE 40 MG: 40 INJECTION, POWDER, FOR SOLUTION INTRAMUSCULAR; INTRAVENOUS at 05:03

## 2024-03-19 RX ADMIN — TOPIRAMATE 50 MG: 100 TABLET, FILM COATED ORAL at 18:25

## 2024-03-19 RX ADMIN — PANTOPRAZOLE SODIUM 40 MG: 40 INJECTION, POWDER, FOR SOLUTION INTRAVENOUS at 21:45

## 2024-03-19 RX ADMIN — IPRATROPIUM BROMIDE 0.5 MG: 0.5 SOLUTION RESPIRATORY (INHALATION) at 19:17

## 2024-03-19 RX ADMIN — HEPARIN SODIUM 5000 UNITS: 5000 INJECTION INTRAVENOUS; SUBCUTANEOUS at 21:42

## 2024-03-19 RX ADMIN — VENLAFAXINE 12.5 MG: 25 TABLET ORAL at 08:15

## 2024-03-19 RX ADMIN — SODIUM CHLORIDE SOLN NEBU 3% 4 ML: 3 NEBU SOLN at 13:53

## 2024-03-19 RX ADMIN — CHLORHEXIDINE GLUCONATE 0.12% ORAL RINSE 15 ML: 1.2 LIQUID ORAL at 21:48

## 2024-03-19 RX ADMIN — SODIUM CHLORIDE SOLN NEBU 3% 4 ML: 3 NEBU SOLN at 07:41

## 2024-03-19 RX ADMIN — METHYLPREDNISOLONE SODIUM SUCCINATE 40 MG: 40 INJECTION, POWDER, FOR SOLUTION INTRAMUSCULAR; INTRAVENOUS at 13:08

## 2024-03-19 RX ADMIN — METHYLPREDNISOLONE SODIUM SUCCINATE 40 MG: 40 INJECTION, POWDER, FOR SOLUTION INTRAMUSCULAR; INTRAVENOUS at 17:56

## 2024-03-19 RX ADMIN — PANTOPRAZOLE SODIUM 40 MG: 40 INJECTION, POWDER, FOR SOLUTION INTRAVENOUS at 08:56

## 2024-03-19 RX ADMIN — IPRATROPIUM BROMIDE 0.5 MG: 0.5 SOLUTION RESPIRATORY (INHALATION) at 13:53

## 2024-03-19 RX ADMIN — Medication 2 SPRAY: at 09:35

## 2024-03-19 RX ADMIN — VENLAFAXINE 12.5 MG: 25 TABLET ORAL at 13:09

## 2024-03-19 RX ADMIN — CHLORHEXIDINE GLUCONATE 0.12% ORAL RINSE 15 ML: 1.2 LIQUID ORAL at 08:56

## 2024-03-19 NOTE — ASSESSMENT & PLAN NOTE
Code Status: full - Level 1  Min remains disease directed with only limit of no prolonged nursing home stay. He understands that best case scenario she will need months of recovery. Prognosis guarded at this time.

## 2024-03-19 NOTE — PLAN OF CARE
Problem: OCCUPATIONAL THERAPY ADULT  Goal: Performs self-care activities at highest level of function for planned discharge setting.  See evaluation for individualized goals.  Description: Treatment Interventions: ADL retraining, Functional transfer training, UE strengthening/ROM, Endurance training, Patient/family training, Equipment evaluation/education, Compensatory technique education, Continued evaluation, Energy conservation, Activityengagement          See flowsheet documentation for full assessment, interventions and recommendations.   Outcome: Progressing  Note: Limitation: Decreased ADL status, Decreased UE ROM, Decreased UE strength, Decreased Safe judgement during ADL, Decreased cognition, Decreased endurance, Decreased self-care trans, Decreased high-level ADLs, Non-func R UE, Non-func L UE  Prognosis: Fair, Poor  Assessment: Pt was seen for a skilled OT treatment session on 3/19/24 focusing on PROM to maintain/improve ROM/strength and sitting tolerance to assist with safe engagement in seated ADLs. Pt was agreeable to session, nodded head when asked if she wanted to sit upright. Pt was dependent during bed mobility, demonstrated poor+ sitting balance while seated EOB. HR monitored, remained WNL throughout session. While seated, pt participated in B/L UE PROM to maintain/improve ROM. Pt demonstrated slight catch and release spasticity during RUE elbow extension. While seated, pt was seen moving fingers and forearms in an attempt to stabilize trunk while sitting. Pt was also able to follow directives and slightly squeeze fingers using L hand. Pt then expressed desire to return to supine by nodding head when asked, was returned supine in bed. OT will continue to monitor/treat, extend goals +14 days.     Rehab Resource Intensity Level, OT: II (Moderate Resource Intensity)

## 2024-03-19 NOTE — OCCUPATIONAL THERAPY NOTE
Occupational Therapy Progress Note     Patient Name: Carla Hunt  Today's Date: 3/19/2024  Problem List  Principal Problem:    Acute respiratory failure with hypoxia (HCC)  Active Problems:    Anxiety state    Encephalopathy    COVID    Stage 3b chronic kidney disease (CKD) (HCC)    Teto marginal zone B-cell lymphoma (HCC)    Seizure disorder (HCC)    Hypotension    Palliative care patient    Goals of care, counseling/discussion    Acute kidney injury (HCC)    Cecal volvulus (HCC)          03/19/24 1202   OT Last Visit   OT Visit Date 03/19/24   Note Type   Note Type Treatment   Pain Assessment   Pain Assessment Tool FLACC   Pain Rating: FLACC (Rest) - Face 0   Pain Rating: FLACC (Rest) - Legs 0   Pain Rating: FLACC (Rest) - Activity 0   Pain Rating: FLACC (Rest) - Cry 0   Pain Rating: FLACC (Rest) - Consolability 0   Score: FLACC (Rest) 0   Pain Rating: FLACC (Activity) - Face 0   Pain Rating: FLACC (Activity) - Legs 1   Pain Rating: FLACC (Activity) - Activity 0   Pain Rating: FLACC (Activity) - Cry 0   Pain Rating: FLACC (Activity) - Consolability 0   Score: FLACC (Activity) 1   Restrictions/Precautions   Weight Bearing Precautions Per Order No   Braces or Orthoses Other (Comment)  (B/L resting hand splints)   Other Precautions Contact/isolation;Airborne/isolation;Cognitive;Bed Alarm;Multiple lines;Telemetry;Fall Risk;O2;Pain   Lifestyle   Autonomy I adls and mobility - i iadls - shares homemaking with family   Reciprocal Relationships supportive family   Service to Others volunteers   Intrinsic Gratification will continue to assess   Bed Mobility   Supine to Sit 1  Dependent   Additional items Assist x 2;HOB elevated   Sit to Supine 1  Dependent   Additional items Assist x 2   Additional Comments Pt HR remained WNL while sitting EOB, tolerated ~20 mins sitting before wanting to return to supine by nodding head   ROM- Right Upper Extremities   R Shoulder PROM;Flexion;Extension   R Elbow PROM;Elbow  flexion;Elbow extension  (Slight catch+release spasticity during elbow extension)   R Wrist Prolonged stretch;Wrist flexion;Wrist extension;Radial deviation;Ulnar deviation   R Hand PROM;Prolonged stretch;Thumb;Index finger;Long finger;Ring finger;Little finger   R Position Seated  (EOB)   ROM - Left Upper Extremities    L Shoulder PROM;Flexion;Extension   L Elbow PROM;Elbow flexion;Elbow extension   L Wrist Prolonged stretch;Wrist flexion;Wrist extension;Radial deviation;Ulnar deviation   L Hand PROM;Prolonged stretch;Thumb;Index finger;Long finger;Ring finger;Little finger   L Position Seated  (EOB)   Subjective   Subjective Nodded head when asked if she wanted to sit EOB   Cognition   Overall Cognitive Status Impaired   Arousal/Participation Sedated;Persistent stimuli required   Attention WILL   Orientation Level Unable to assess   Memory Unable to assess   Following Commands Unable to follow one step commands   Activity Tolerance   Activity Tolerance Patient limited by fatigue   Medical Staff Made Aware PT present   Assessment   Assessment Pt was seen for a skilled OT treatment session on 3/19/24 focusing on PROM to maintain/improve ROM/strength and sitting tolerance to assist with safe engagement in seated ADLs. Pt was agreeable to session, nodded head when asked if she wanted to sit upright. Pt was dependent during bed mobility, demonstrated poor+ sitting balance while seated EOB. HR monitored, remained WNL throughout session. While seated, pt participated in B/L UE PROM to maintain/improve ROM. Pt demonstrated slight catch and release spasticity during RUE elbow extension. While seated, pt was seen moving fingers and forearms in an attempt to stabilize trunk while sitting. Pt was also able to follow directives and slightly squeeze fingers using L hand. Pt then expressed desire to return to supine by nodding head when asked, was returned supine in bed. OT will continue to monitor/treat, extend goals +14 days.    Plan   Treatment Interventions Functional transfer training;Endurance training;UE strengthening/ROM;Continued evaluation;Activityengagement   Goal Expiration Date 04/02/24   OT Treatment Day 11   OT Frequency 3-5x/wk   Discharge Recommendation   Rehab Resource Intensity Level, OT II (Moderate Resource Intensity)   AM-PAC Daily Activity Inpatient   Lower Body Dressing 1   Bathing 1   Toileting 1   Upper Body Dressing 1   Grooming 1   Eating 1   Daily Activity Raw Score 6   AM-PAC Applied Cognition Inpatient   Following a Speech/Presentation 1   Understanding Ordinary Conversation 2   Taking Medications 1   Remembering Where Things Are Placed or Put Away 1   Remembering List of 4-5 Errands 1   Taking Care of Complicated Tasks 1   Applied Cognition Raw Score 7   Applied Cognition Standardized Score 15.17   End of Consult   Patient Position at End of Consult Supine;All needs within reach   Nurse Communication Nurse aware of consult   Ian Epps

## 2024-03-19 NOTE — PROGRESS NOTES
[facility]  Progress Note: Critical Care  Name: Carla Hunt 59 y.o. female I MRN: [id number]  Unit/Bed#: MICU 12 I Date of Admission: 1/10/2024   Date of Service: 3/19/2024 I Hospital Day: 69    Assessment/Plan   Acute hypoxic respiratory failure; vent dependent; s/p tracheostomy 3/12  Persistent COVID-19 infection; s/p 14d course of antivirals completed 3/15  Metabolic encephalopathy  Bilateral ground glass opacities  Stenotrophomonas pneumonia; previously on Bactrim, switched to minocycline 2/2 #8  MDR Pseudomonas PNA s/p tx with levaquin   ELIESER, pre-renal, on CKD3  Acute on chronic anemia- multifactorial-?blood loss, chronic inflammation, iatrogenic  Shock - likely mixed septic, hypovolemic; resolved  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; complicated by #11  Wound dehiscence 2/2 poor wound healing s/p wound vac 3/13  History of B-Cell lymphoma, s/p chemotheraphy 2022, Rituxan maintenance therapy on hold  Minimal SAH POA, no interventions required, resolved on repeat CTHS  Seizure disorder; on home AEDs  Steroid induced hyperglycemia; controlled on insulin gtt  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  -Most recent repeat CTH 3/13 negative for acute intracranial findings.  -Etiology unclear. Has been off sedation. Electrolytes, sodium, BS at goal. Ammonia normal. May be prolonged 2/2 PNA, likely worsened by  uremic encephalopathy, however less likely given GFR not <15. Ft; candida growth seen on BAL Cx from 3/11, may be respiraotry colonization, however fungal infection not ruled in setting of severe lymphopenia, depressed immunoglobulins.     Plan:  -Avoid PRN fentanyl/sedative meds as able.  -Started modafinil t 100mg qAM to stimulate arousal per palliative     -D/c prn zyprexa.  -Frequent neurochecks   -?Repeat MRI brain to evaluate for eval for encephalitis/inflammatory process, however may not   -Video EEG deferred at thist time due to low suspicion for seizures. Less likely non-convulsive status epilepticus as patient is on home AEDs.  -Continue steroid taper as below  -ABX tx with PNA as below  -?Consideration for emperic antifungal theraphy    Diagnosis: seizure disorder, known history  -S/p partial left temporal lobe resection in 2007  -On Lamictal 150 bid and Topamax 50mg bid  -Last lamotrigine level from 03/02 at 10.7 (therapeutic)    Diagnosis: Anxiety, known history  -On Effexor 12.5 mg TID    CV:  -Diagnosis: Sinus tachycarida  -In setting of anemia/hypovolemia bedside US 3/18 revealing IVC <1 cm,fully collapsible with inspiration; Hgb improved s/p 2U PRBCs on 3/17, recievd 500cc bolus IVF x2.  -TTE 3/17 with no major structural dysfunction; Ecg confirms sinus rhythm   -May be 2/2 pain, dehydration/hypvol, acute on chronic anemia, underlying infectious process, drug/Modafinal induced however only received one dose thus far  Plan:  - See plans under Pulm, Heme/Onc, ID      Pulm:  Diagnosis: Acute hypoxic respiratory failure  Diagnosis: Bilateral ground glass opacities, persistent  -Vent dependent; s/p trach 3/12 after was reintubated 3/11 due to increased WOB,elevated CRP  -Persistently COVID+ s/p multiple courses of antivirals (most recent completed 3/15). Complicated by recurrent bacterial PNA treated w 10d levaquin (completed 2/25) for MDR PNA; most recent BAL +recurrent stenotrophomona  treated with Bactrim, swithced to minocyline today 2/2 ELIESER  -Etiology Likely multifactorial including possible COVID pneumonitis vs recurrent PNA vs hypersensitivity pneumonitis 2/2 ?rituxumab. Persistent inflammation likely given patient has been steroid responsive throughout admission. CRP continues downward trend. Lack of clinical improvement despite steroids, ABx, antivirals. Fungal source not ruled out in setting of severe lymphopheia, low immunoglobulins.  Plan:  -Finished 14d course of Paxlovid/remdesivir on 3/15  -Start minocycline 100mg bid via NGT to tx Stenotrophomonas PNA as below  -Steroid wean- IV Solu-Medrol 40 mg q6h x3d. Plan for slow steroid wean by 10mg every 3d until patient is down to 10mg q6h and then start decreasing frequency till patient is off.  -Can consider starting steroid sparing therapy in consultation with pharmacy if patient able to tolerate steroid wean.  -Trend CRP daily to guide steroid therapy  -Follow up BAL viral culture pending  -Follow up Fungitell  -Follow up anti-Rituximab ab  -Trial SBT once tachypnea, tachycardia improves    GI:  Diagnosis: Partial cecal volvulus s/p right hemicolectomy with ostomy pouch in place  -Complicated by poor wound healing of midline incision as evidenced by wound dehiscence s/p wound vac that was removed 3/17 by gen surg.  -Stoma with dark brown output, consistent with prior  Plan:  -Monitor off wound vac  -Monitor ostomy pouch output  -Surgery following, will keep them updated if any further changes  -Tube feeds running    :  Diagnosis: ELIESER on CKD III  -Baseline Cr  0.8-1.2  -Cr remains elevated but improving today, at 1.79. compared to 1.89, BUN remains elevated in 80s  -UO adequate on Ortiz, draining clear yellow urine  -Likely prerenal azotemia 2/2 hypovolemia, anemia-?blood loss, likely exacerbated by bactrim use (increased Cr secretion)  Plan:  -S/P IVF, 2U prb  -Switch bactrim to minocycline  -Trend daily BMP  -Continue to monitor I/O  and UOP  -Avoid nephrotoxins as able  -Ensure adequate pre/post IVF prior to potential contrast exposure if CT w contrast planned to evaluate for ?blood loss anemia as below.    Diagnosis: NAGMA  -Cl normal, positive urine gap. No loose stools/diarrhea reported  -Likely 2/2 uremic acidosis and ELIESER. Less likely med-induced/Topamax/RTA2 given positive UG. ?RTA1 in stting of hypokalemia however less likely given normal serum Cl  Plan:  -S/P IVF  -Swich bactrim to minocyclin  -VBG today  -Trend bmp daily    F/E/N:  F: none  E: monitor and replete for goal K>4, P>3, Mg>2  N: On tube feeds with vital 1.5; 45 cc/h, Prosource liquid protein to 1 packet BID    Heme/Onc:  Diagnosis: Acute on chronic anemia  -Baseline Hgb ~7-8. S/P 2U PRBCs 3/17 after repeat Hgb 5.3, total of 6U transfused since admission.  -Scant bleeding noted from from midline incision. No significant from ostomy bag.\  -Elevated retic count, tachycarida, interittent MAPS<65, uremia suggestive of blood loss anemia. Slow UGIB not ruled out as patient remains sinus tachy, BUN remains elevated despite blood transfusion/fluid resuscitation.  -?Worsened by impaired marrow response liekly 2/2 persistent inflammation due to low retic index ~1 prior to most recent transfusions; normocytic with normal B12/folate levels; MCV<100. SPEP/UPEP negative. Hemolysis labs pending. Iatrogenic cause likely contributing 2/2 frequent blood draws; chronic anemia likely persistent inflammatory state/CKD/lymphoma in setting of elevated ferritin of 974  Plan:  -Obtain CT c/a/p w IV/PO contrast if ELIESER improves; will consider CT w po contrast only if no improvement in ELIESER  -Follow up hemolysis labs  -Monitor ostomy output  -Continue tube feeds/nutritional support  ·Trend CBC, transfuse to maintain Hgb>7    Diagnosis Lymphopenia  -In setting of high dose steroids  -Severely depressed immunoglobulins inclding IgG, IgA, Igm  -At risk for further infections  -Contact and airborne  precautions  -Follow up BAL studies, CMV pcr    Diagnosis: B cell lymphoma  -Follow[facility] with heme[facility]onc at [facility]  -S/P 6 cycles of chemotherapy in 20222  -Maintained on Rituxan for 2 year course PTA, started May 2022, last dose was 12/2024, treatment currently on hold in setting of persistent COVID-19 infection  Plan:  -Holding rituximab in setting of persistent COVID-19 infection  -Follow up anti-Rituximab ab to assess for drug induced hypersensitivity syndrome    DVT ppx with lovenox    Endo:  Diagnosis: steroid hyperglycemia and diabetes mellitus type 2, A1c at 6.6 from 01/2024  -Goal -180  -BG range last 24hrs 113-174  Plan:  -On insulin gtt    ID:  Diagnosis: Recurrent bacterial pneumonia  - Patient has completed multiple treatment courses of remdesivir throughout hospitalization in addition to 7 days of ceftriaxone.  - BAL cultures in 2/2024 positive for MDR Pseudomonas and stenotrophomonas treated with 10d course of Levaquin  - CT C/A/P 3/10 with persistent groundglass opacities and changes suggestive of sequelae of COVID, prior infections.  Completed 10d course of cefepime for broad spectrum coveragge (completed (3/13)  -Repeat BAL cultures from 3/11 showing 4+ growth Stenotrophomonas maltophilia. Could be colonization given prior BAL growth of same, however due to recent intubation with prolonged corticosteroid theraphy, will begin treating as true pathogen. Patient otherwise remains afebrile, no leukocytosis. CRP with down trending.  Plan:  -Bactrim discontinued 2/2 ELIESER  -Start minocycline 100mg bid, via NGT; hold TF 1hr pre and post minocycline adminstration.  -Plan to treat for 10d course through 3/23 w/ abx  -IV steroids as above    MSK/Skin:  Diagnosis: Midline incision wound with poor wound healing-  -General surgery performed staple removal of the patient's midline incision on 3/12 with some skin signs appreciated at the inferior aspect of the wound without obvious pus drainage. Overnight 3/13, wound  dehiscence progressed requiring general surgery reevaluation. Red/brown aspirate noted, fascia intact. Wet-to-dry dressings in place. General surgery following for next Epson wound care.  -Poor wound healing in setting of high-dose steroid therapy.  No  exam no obvious signs of infection at this time.   -S/P wound vac placement 3/13 as per gen surgery. No active concerns for cellulitis.  Plan:  -Being monitored off wound vac as of 3/17 as per gen surg  -Routine monitoring, wound care as per general surgery.    Diagnosis: Critical illness myopathy  -Likely exacerbated by high-dose steroid therapy  Plan:  -Continue to wean steroids as above  -PT/OT following        Disposition: Critical care    ICU Core Measures     Vented Patient  VAP Bundle  VAP bundle ordered     A: Assess, Prevent, and Manage Pain Has pain been assessed? Yes  Need for changes to pain regimen? No   B: Both Spontaneous Awakening Trials (SATs) and Spontaneous Breathing Trials (SBTs) Plan to perform spontaneous awakening trial today? Yes   Plan to perform spontaneous breathing trial today? Yes   Obvious barriers to extubation? No   C: Choice of Sedation RASS Goal: -1 Drowsy or 0 Alert and Calm  Need for changes to sedation or analgesia regimen? NA   D: Delirium CAM-ICU: Positive   E: Early Mobility  Plan for early mobility? Yes   F: Family Engagement Plan for family engagement today? Yes       Antibiotic Review: Patient on appropriate coverage based on culture data.  and Infectious disease consulted    Review of Invasive Devices:    Diana Plan: Continue for accurate I/O monitoring for 48 hours        Prophylaxis:  VTE VTE covered by:  heparin (porcine), Subcutaneous, 5,000 Units at 03/19/24 0502       Stress Ulcer  covered bypantoprazole (PROTONIX) injection 40 mg [[id number]]        Significant 24hr Events     24hr events: 3/18- Wound vac now off for the time being per gen surg.  S/p 2U rbc's 3/18, repeat Hgb 9.9. Stable Hgb overnight. Sinus tachy but  improved to 110s.-120s. Scant bleeding noted in ostomy bag, gen surg made aware, no interventions recommended.    Subjective     Review of Systems   Unable to perform ROS: Acuity of condition        Objective                            Vitals I/O      Most Recent Min/Max in 24hrs   Temp 97.6 °F (36.4 °C) Temp  Min: 97.5 °F (36.4 °C)  Max: 98.7 °F (37.1 °C)   Pulse (!) 132 Pulse  Min: 95  Max: 134   Resp (!) 25 Resp  Min: 18  Max: 37   /73 BP  Min: 89/51  Max: 136/80   O2 Sat 98 % SpO2  Min: 97 %  Max: 99 %     PS 10/6/40% FiO2   Intake/Output Summary (Last 24 hours) at 3/19/2024 0642  Last data filed at 3/19/2024 0548  Gross per 24 hour   Intake 4465.32 ml   Output 1930 ml   Net 2535.32 ml       Diet Enteral/Parenteral; Tube Feeding No Oral Diet; Vital 1.5; Continuous; 45; Prosource Protein Liquid - One Packet; OD; 300; Water; Every 6 hours    Invasive Monitoring           Physical Exam   Physical Exam  Eyes:      Pupils: Pupils are equal, round, and reactive to light.   Skin:     General: Skin is warm.      Coloration: Skin is not jaundiced.   HENT:      Mouth/Throat:      Mouth: Mucous membranes are moist.   Neck:      Trachea: Tracheostomy present.   Cardiovascular:      Rate and Rhythm: Tachycardia present.      Heart sounds: No murmur heard.  Musculoskeletal:         General: No swelling.      Right lower leg: No edema.      Left lower leg: No edema.   Abdominal: General: An ostomy site is present. There is ostomy site.There is no distension.      Palpations: Abdomen is soft.      Tenderness: There is no abdominal tenderness.      Comments: Midline incision intact with scant blood, no active drainage.    Constitutional:       Appearance: She is ill-appearing.   Pulmonary:      Effort: Pulmonary effort is normal.      Breath sounds: Normal breath sounds.   Neurological:      Comments: Opens eyes to voice, nods yes to simple commands but otherwise neuro exam unappreciable.    Genitourinary/Anorectal:  Ortiz  present.          Diagnostic Studies      EKG: 3/18   Sinus tachycardia, P 132  Nonspecific ST abnormality  Abnormal ECG  When compared with ECG of 12-MAR-2024 05:36,  Nonspecific T wave abnormality no longer evident in Inferior leads  T wave inversion no longer evident in Anterior leads  Imaging:   CXR 3/14 Worsening consolidation throughout the right lung and left lower lobe consistent with multi lobar pneumonia, possibly aspiration.    CTH 3/14 No acute intracranial pathology. Stable postoperative changes status post left temporal lobectomy. Chronic microangiopathy.  Stable bilateral mastoid effusions, left greater than right and left middle ear effusion.. Stable sinus inflammatory changes.        Medications:  Scheduled PRN   chlorhexidine, 15 mL, Q12H SARTHAK  heparin (porcine), 5,000 Units, Q8H SARTHAK  ipratropium, 0.5 mg, TID  lamoTRIgine, 150 mg, BID  levalbuterol, 1.25 mg, TID  lidocaine (PF), 10 mL, Once  methylPREDNISolone sodium succinate, 40 mg, Q6H SARTHAK   Followed by  [START ON 3/20/2024] methylPREDNISolone sodium succinate, 30 mg, Q6H SARTHAK   Followed by  [START ON 3/23/2024] methylPREDNISolone sodium succinate, 20 mg, Q6H SARTHAK   Followed by  [START ON 3/26/2024] methylPREDNISolone sodium succinate, 10 mg, Q6H SARTHAK   Followed by  [START ON 3/29/2024] methylPREDNISolone sodium succinate, 10 mg, Q8H SARTHAK   Followed by  [START ON 4/1/2024] methylPREDNISolone sodium succinate, 10 mg, Q12H SARTHAK   Followed by  [START ON 4/8/2024] methylPREDNISolone sodium succinate, 10 mg, Daily  minocycline, 100 mg, Q12H SARTHAK  modafinil, 100 mg, Daily  nystatin, , BID  pantoprazole, 40 mg, Q12H SARTHAK  polyethylene glycol, 17 g, Daily  sodium chloride, 2 spray, Q4H  sodium chloride, 4 mL, TID  topiramate, 50 mg, BID  venlafaxine, 12.5 mg, TID With Meals      acetaminophen, 650 mg, Q6H PRN  fentaNYL, 50 mcg, Q1H PRN  LORazepam, 0.5 mg, Q6H PRN  ondansetron, 4 mg, Q6H PRN  sodium chloride, 1 Application, Q1H PRN       Continuous    insulin  regular (HumuLIN R,NovoLIN R) 1 Units/mL in sodium chloride 0.9 % 100 mL infusion, 0.3-21 Units/hr, Last Rate: 4 Units/hr (03/19/24 0435)         Labs:    CBC    Recent Labs     03/18/24 0429 03/18/24  0533 03/18/24  2252 03/19/24  0543   WBC 8.39  8.39  --   --  9.92   HGB 5.6*  5.6*   < > 9.3* 9.5*   HCT 17.6*  17.6*   < > 27.0* 28.1*   PLT 89*  89*  --   --  86*   BANDSPCT 5  --   --   --     < > = values in this interval not displayed.     Manual diff pending  Folate wnl   BMP    Recent Labs     03/18/24 0429 03/19/24  0543   SODIUM 135 138   K 4.1 4.7    107   CO2 18* 17*   AGAP 10 14*   BUN 81* 85*   CREATININE 1.89* 1.79*   CALCIUM 8.0* 8.4       Coags    No recent results     Additional Electrolytes  Recent Labs     03/18/24 0429 03/19/24  0543   MG 2.3 2.3   PHOS 4.0 4.0   CAIONIZED 1.20  --           Blood Gas    Recent Labs     03/17/24  0812   PHART 7.375   DOX6EIO 32.1*   PO2ART 64.4*   HOI0YCR 18.4*   BEART -6.1   SOURCE Line, Arterial     Recent Labs     03/17/24  0812   SOURCE Line, Arterial    LFTs  Recent Labs     03/19/24  0543   ALT 64*   AST 29   ALKPHOS 141*   ALB 2.5*   TBILI 0.47      Haptoglobin pending  IgG 180  IgA 42  IgM <20  CRP 41 <38       Infectious  No recent results     CMV PCR pending  Fungitell pending  Anti-rituximab pending Glucose  Recent Labs     03/18/24 0429 03/19/24  0543   GLUC 139 207*               Joe Lazcano DO

## 2024-03-19 NOTE — PROGRESS NOTES
[facility]  Progress Note  Name: Carla Hunt I  MRN: [id number]  Unit/Bed#: MICU 12 I Date of Admission: 1/10/2024   Date of Service: 3/19/2024 I Hospital Day: 69    Assessment/Plan   Goals of care, counseling/discussion  Assessment & Plan  Code Status: full - Level 1  Min remains disease directed with only limit of no prolonged nursing home stay. He understands that best case scenario she will need months of recovery. Prognosis guarded at this time.    Palliative care patient  Assessment & Plan  PSC, including MSW support, will follow closely to continue to provide supportive care as clinical situation evolves.   Pastoral care consulted  Decisional apparatus:  Patient is not competent on my exam today.  If competence is lost, patient's substitute decision maker would default to spouse Min by [place] Act 169.  Advance Directive / Living Will / POLST:  None on file, unable to complete    Teto marginal zone B-cell lymphoma (HCC)  Assessment & Plan  Previously on maintenance Rituxan therapy. Last treatment in December 2023    Encephalopathy  Assessment & Plan  Patient with minimal responsiveness. Prior brain imaging relatively unremarkable. Appears to have prolonged drowsiness after any procedures requiring sedation.  provigil 100mg QAM started on 3/19    Anxiety state  Assessment & Plan  Continue Effexor per primary  Ativan 0.5mg Q6H PRN    * Acute respiratory failure with hypoxia (HCC)  Assessment & Plan  Patient was re-intubated 3/11 with subsequent bedside trach on 3/12.   Very careful steroid management and work-up per critical care team.          Interval history:       Hgb stable this AM after being transfused yesterday. Slightly more alert today, opening eyes, tracking, occasionally following commands. GI consulted for concern of GI bleed and planning EGD later this week. Time spent providing support to spouse at bedside.    MEDICATIONS / ALLERGIES:     all  current active meds have been reviewed    No Known Allergies    OBJECTIVE:    Physical Exam  Physical Exam  Constitutional:       General: She is not in acute distress.     Appearance: She is ill-appearing.   HENT:      Head: Atraumatic.   Eyes:      Conjunctiva/sclera: Conjunctivae normal.   Neck:      Comments: Trach CDI  Cardiovascular:      Rate and Rhythm: Tachycardia present.   Pulmonary:      Comments: Ventilated via trach  Abdominal:      Comments: Ostomy in place, visualized via media tab   Musculoskeletal:         General: Swelling present.   Skin:     General: Skin is warm and dry.   Neurological:      Comments: Opens eyes to voice, tracks. Inconsistently follows commands.   Psychiatric:      Comments: Calm, no agitation         Lab Results:   Results from last 7 days   Lab Units 03/19/24  0543 03/18/24  2252 03/18/24  1620 03/18/24  0533 03/18/24  0429 03/17/24  0800 03/17/24  0451   WBC Thousand/uL 9.92  --   --   --  8.39  8.39  --  11.78*   HEMOGLOBIN g/dL 9.5* 9.3* 9.9*   < > 5.6*  5.6*   < > 6.8*   HEMATOCRIT % 28.1* 27.0* 29.4*   < > 17.6*  17.6*  --  21.5*   PLATELETS Thousands/uL 86*  --   --   --  89*  89*  --  124*   MONO PCT % 1*  --   --   --  0*  --   --    EOS PCT % 0  --   --   --  0  --   --     < > = values in this interval not displayed.     Results from last 7 days   Lab Units 03/19/24  0543 03/18/24  0429 03/17/24  0451   POTASSIUM mmol/L 4.7 4.1 4.3   CHLORIDE mmol/L 107 107 105   CO2 mmol/L 17* 18* 19*   BUN mg/dL 85* 81* 71*   CREATININE mg/dL 1.79* 1.89* 1.65*   CALCIUM mg/dL 8.4 8.0* 8.2*   ALK PHOS U/L 141*  --   --    ALT U/L 64*  --   --    AST U/L 29  --   --        Imaging Studies: reviewed pertinent studies   EKG, Pathology, and Other Studies: reviewed pertinent studies    Counseling / Coordination of Care    Total floor / unit time spent today 25 minutes. Greater than 50% of total time was spent with the patient and / or family counseling and / or coordination of care.  A description of the counseling / coordination of care: time spent assessing patient, communicating with RN, spouse at bedside.

## 2024-03-19 NOTE — ASSESSMENT & PLAN NOTE
Patient with minimal responsiveness. Prior brain imaging relatively unremarkable. Appears to have prolonged drowsiness after any procedures requiring sedation.  provigil 100mg QAM started on 3/19

## 2024-03-19 NOTE — RESPIRATORY THERAPY NOTE
03/19/24 0312   Respiratory Assessment   Assessment Type Assess only   General Appearance Sleeping   Respiratory Pattern Assisted;Spontaneous   Chest Assessment Chest expansion symmetrical   Bilateral Breath Sounds Coarse   Cough None   Suction Trach   Resp Comments Pt. remains on PSV, tolerating well.   O2 Device G5   Vent Information   Vent ID [id number]   Vent type Summers G5   Summers Vent Mode SPONT   $ Vent Daily Charge-Subsequent Yes   $ Pulse Oximetry Spot Check Charge Completed   SPONT Settings   FIO2 (%) 40 %   PEEP (cmH2O) 6 cmH2O   Pressure Support (cmH2O) 10 cmH20   Flow Trigger (LPM) 4 LPM   Trigger Sensitivity Pressure (cm H2O)  2 cm H2O   P-ramp (ms) 50 ms   ETS  (%) 25 %   Humidification Heater   Heater Temp 95 °F (35 °C)   SPONT Actuals   Resp Rate (BPM) 19 BPM   VT (mL) 691 mL   MV (Obs) 12.7   MAP (cmH2O) 8.8 cmH2O   Peak Pressure (cmH2O) 22 cmH2O   I:E Ratio (Obs) 1/3.2   RSBI (f/vt) 32 f/Vt   Heater Temperature (Obs) 95 °F (35 °C)   SPONT Alarms   High Peak Pressure (cmH20) 40 cmH2O   High Resp Rate (BPM) 40 BPM   High MV (L/min) 26 L/min   Low MV (L/min) 5 L/min   High Spont VTE (mL) 1000 mL   Low Spont VTE (mL) 250 mL   Apnea Time (S) 20 S   SPONT Apnea Settings   Resp Rate (BPM) 8 BPM   FIO2 (%) 40 %   %TI (%) 1 %   Apnea Time (S) 20 S   Maintenance   Alarm (pink) cable attached No   Resuscitation bag with peep valve at bedside Yes   Water bag changed Yes   Circuit changed No   Surgical Airway Shiley Cuffed   Placement Date/Time: 03/12/24 1200   Tube Size: 8 mm  Type: Tracheostomy  Brand: Naveen  Style: Cuffed   Status Cuff Inflated;Secured   Ties Assessment Clean;Dry;Intact   Surgical Airway Cuff Pressure (color) Green   Equipment at bedside BVM;Wall Suction setup;Additional complete same size trach tube;Additional complete one size smaller trach tube;Obturator;Sterile saline;Additional inner cannula     RT Ventilator Management Note  Carla Marilee 59 y.o. female MRN:  [id number]  Unit/Bed#: [facility] 12 Encounter: [id number]      Daily Screen         3/18/2024  0757 3/18/2024  1900          Patient safety screen outcome:: Passed Passed                Physical Exam:   Assessment Type: (P) Assess only  General Appearance: (P) Sleeping  Respiratory Pattern: (P) Assisted, Spontaneous  Chest Assessment: (P) Chest expansion symmetrical  Bilateral Breath Sounds: (P) Coarse  Cough: (P) None  Suction: (P) Trach  O2 Device: (P) G5      Resp Comments: (P) Pt. remains on PSV, tolerating well.

## 2024-03-19 NOTE — PROGRESS NOTES
NEPHROLOGY PROGRESS NOTE   Carla Hunt 59 y.o. female MRN: [id number]  Unit/Bed#: MICU 12 Encounter: [id number]  Reason for Consult: ELIESER    Assessment/Plan:  Acute kidney injury, etiology multifactorial.  Likely component due to ATN given hemodynamic fluctuations, worsening anemia, episodic ELIESER, etc.  Additional component could be due to Bactrim use with impaired creatinine secretion.  Creatinine slightly improved at 1.79,  remains nonoliguric.  Urine chloride 35, urine sodium 40  Previous urine dipstick negative with 4-10 white cells, 1-2 red cells.  Suspicion for possible metabolic acidosis recommend checking VBG.  Acute on chronic anemia status post PRBC transfusion, serum and urine urine electrophoresis negative for monoclonal gammopathy  Hypoxic respiratory failure currently remains vent dependent, status post tracheostomy etiology multifactorial given COVID-19 pneumonia with superimposed bacterial component  Recent volvulus status post hemicolectomy with cecal ostomy with noted stomal retraction  B-cell lymphoma, previously on rituximab therapy.  Encephalopathy with recent subarachnoid hemorrhage and history of seizure disorder    Renal function overall appears fairly stable  Remains nonoliguric  Continue monitor hemoglobin closely  Discussed with primary service.    SUBJECTIVE:  Seen and examined.  Family at bedside.  Remains somnolent.  Vent dependent with tracheostomy.    Review of Systems    OBJECTIVE:  Current Weight: Weight - Scale: 72.7 kg (160 lb 4.4 oz)  Vitals:    03/19/24 0700 03/19/24 0800 03/19/24 0855 03/19/24 0900   BP: 119/65 139/75  126/74   BP Location:       Pulse: (!) 116 (!) 131 (!) 126 (!) 130   Resp: 19 21 22 (!) 23   Temp:   99.1 °F (37.3 °C)    TempSrc:   Oral    SpO2: 98% 99% 97% 98%   Weight:       Height:           Intake/Output Summary (Last 24 hours) at 3/19/2024 0929  Last data filed at 3/19/2024 0844  Gross per 24 hour   Intake 4465.32 ml   Output 2030 ml   Net 2435.32 ml        Physical Exam  Constitutional:       Appearance: She is ill-appearing.   Eyes:      General: No scleral icterus.  Cardiovascular:      Rate and Rhythm: Regular rhythm. Tachycardia present.   Pulmonary:      Effort: Pulmonary effort is normal.      Comments: Coarse breath sounds bilaterally  Abdominal:      Palpations: Abdomen is soft.      Tenderness: There is no guarding.   Musculoskeletal:      Right lower leg: Edema present.      Left lower leg: Edema present.   Skin:     General: Skin is warm and dry.      Findings: No rash.         Medications:    Current Facility-Administered Medications:     acetaminophen (TYLENOL) tablet 650 mg, 650 mg, Oral, Q6H PRN, Ryanne Lerma MD    chlorhexidine (PERIDEX) 0.12 % oral rinse 15 mL, 15 mL, Mouth/Throat, Q12H SARTHAK, Miguel Whitatker MD, 15 mL at 03/19/24 0856    fentaNYL injection 50 mcg, 50 mcg, Intravenous, Q1H PRN, Joe Lazcano DO, 50 mcg at 03/16/24 1108    heparin (porcine) subcutaneous injection 5,000 Units, 5,000 Units, Subcutaneous, Q8H SARTHAK, Emilie Soyeon Kim, MD, 5,000 Units at 03/19/24 0502    insulin regular (HumuLIN R,NovoLIN R) 1 Units/mL in sodium chloride 0.9 % 100 mL infusion, 0.3-21 Units/hr, Intravenous, Titrated, Shahriar Helm DO, Last Rate: 4 mL/hr at 03/19/24 0835, 4 Units/hr at 03/19/24 0835    ipratropium (ATROVENT) 0.02 % inhalation solution 0.5 mg, 0.5 mg, Nebulization, TID, Pearl Eubanks MD, 0.5 mg at 03/19/24 0741    lamoTRIgine (LaMICtal) tablet 150 mg, 150 mg, Oral, BID, Jad Mena MD, 150 mg at 03/19/24 0856    levalbuterol (XOPENEX) inhalation solution 1.25 mg, 1.25 mg, Nebulization, TID, Pearl Eubanks MD, 1.25 mg at 03/19/24 0741    lidocaine (PF) (XYLOCAINE-MPF) 2 % injection 10 mL, 10 mL, Inhalation, Once, Miguel Interiano MD    LORazepam (ATIVAN) injection 0.5 mg, 0.5 mg, Intravenous, Q6H PRN, Emilie Soyeon Kim, MD, 0.5 mg at 03/18/24 2232    [COMPLETED] methylPREDNISolone sodium succinate (Solu-MEDROL)  injection 50 mg, 50 mg, Intravenous, Q6H SARTHAK, 50 mg at 03/16/24 1830 **FOLLOWED BY** methylPREDNISolone sodium succinate (Solu-MEDROL) injection 40 mg, 40 mg, Intravenous, Q6H SARTHAK, 40 mg at 03/19/24 0503 **FOLLOWED BY** [START ON 3/20/2024] methylPREDNISolone sodium succinate (Solu-MEDROL) injection 30 mg, 30 mg, Intravenous, Q6H SARTHAK **FOLLOWED BY** [START ON 3/23/2024] methylPREDNISolone sodium succinate (Solu-MEDROL) injection 20 mg, 20 mg, Intravenous, Q6H SARTHAK **FOLLOWED BY** [START ON 3/26/2024] methylPREDNISolone sodium succinate (Solu-MEDROL) injection 10 mg, 10 mg, Intravenous, Q6H SARTHAK **FOLLOWED BY** [START ON 3/29/2024] methylPREDNISolone sodium succinate (Solu-MEDROL) injection 10 mg, 10 mg, Intravenous, Q8H SARTHAK **FOLLOWED BY** [START ON 4/1/2024] methylPREDNISolone sodium succinate (Solu-MEDROL) injection 10 mg, 10 mg, Intravenous, Q12H SARTHAK **FOLLOWED BY** [START ON 4/8/2024] methylPREDNISolone sodium succinate (Solu-MEDROL) injection 10 mg, 10 mg, Intravenous, Daily, Joe Lazcano DO    minocycline (DYNACIN) tablet 200 mg, 200 mg, Per NG Tube, BID, Betzaida Sr MD    modafinil (PROVIGIL) tablet 100 mg, 100 mg, Per NG Tube, Daily, Hannah Ramirez PA-C, 100 mg at 03/19/24 0856    multi-electrolyte (ISOLYTE-S PH 7.4) bolus 1,000 mL, 1,000 mL, Intravenous, Once, Joe Lazcano DO    nystatin (MYCOSTATIN) powder, , Topical, BID, Miguel Whittaker MD, Given at 03/19/24 0900    ondansetron (ZOFRAN) injection 4 mg, 4 mg, Intravenous, Q6H PRN, Nereyda Mar DO    pantoprazole (PROTONIX) injection 40 mg, 40 mg, Intravenous, Q12H SARTHAK, Joe Lazcano DO, 40 mg at 03/19/24 0856    polyethylene glycol (MIRALAX) packet 17 g, 17 g, Per NG Tube, Daily, Jad Mena MD, 17 g at 03/19/24 0909    sodium chloride (AYR SALINE NASAL) nasal gel 1 Application, 1 Application, Nasal, Q1H PRN, Miguel Whittaker MD    sodium chloride (OCEAN) 0.65 % nasal spray 2 spray, 2 spray, Each Nare, Q4H, Miguel Whittaker MD, 2 spray at 03/19/24 0500     sodium chloride 3 % inhalation solution 4 mL, 4 mL, Nebulization, TID, Miguel Interiano MD, 4 mL at 03/19/24 0741    topiramate (TOPAMAX) 6 mg/ml oral suspension 50 mg, 50 mg, Per PEG Tube, BID, Emilie Soyeon Kim, MD, 50 mg at 03/18/24 1713    venlafaxine (EFFEXOR) tablet 12.5 mg, 12.5 mg, Oral, TID With Meals, Gomezdandre LandaciaraDO, 12.5 mg at 03/19/24 0815    Laboratory Results:  Results from last 7 days   Lab Units 03/19/24  0543 03/18/24  2252 03/18/24  1620 03/18/24  0533 03/18/24  0429 03/17/24  0800 03/17/24  0451 03/16/24  0611 03/15/24  0603 03/14/24  0542 03/13/24  0449   WBC Thousand/uL 9.92  --   --   --  8.39  8.39  --  11.78* 9.47 7.59 10.46* 8.87   HEMOGLOBIN g/dL 9.5* 9.3* 9.9* 5.3* 5.6*  5.6* 7.5* 6.8* 7.4* 7.1* 7.4* 7.4*   HEMATOCRIT % 28.1* 27.0* 29.4* 16.3* 17.6*  17.6*  --  21.5* 22.2* 21.2* 22.5* 22.1*   PLATELETS Thousands/uL 86*  --   --   --  89*  89*  --  124* 139* 118* 191 163   POTASSIUM mmol/L 4.7  --   --   --  4.1  --  4.3 3.9 3.3* 3.2* 4.0   CHLORIDE mmol/L 107  --   --   --  107  --  105 107 109* 112* 114*   CO2 mmol/L 17*  --   --   --  18*  --  19* 18* 18* 19* 19*   BUN mg/dL 85*  --   --   --  81*  --  71* 65* 61* 59* 53*   CREATININE mg/dL 1.79*  --   --   --  1.89*  --  1.65* 1.65* 1.59* 1.55* 1.28   CALCIUM mg/dL 8.4  --   --   --  8.0*  --  8.2* 8.2* 7.6* 7.7* 7.7*   MAGNESIUM mg/dL 2.3  --   --   --  2.3  --  2.2 2.1 1.9 2.0 2.0   PHOSPHORUS mg/dL 4.0  --   --   --  4.0  --  3.6 3.6 3.8 4.0  4.0 3.9

## 2024-03-19 NOTE — UTILIZATION REVIEW
"Continued Stay Review    Date: 03/19                          Current Patient Class: IP  Current Level of Care: Level 2 stepdown/HOT    HPI:59 y.o. female initially admitted on 01/10     Assessment/Plan: Pt was sinus tachy ovn, improved to 110s-120s. Scant bleeding noted in ostomy bag, gen surg made aware, no interventions recommended. Creatinine improving, BUN remains elevated. Consult GI. Continue modafinil. Continue PS but decrease to 8 and keep PEEP. Continue tube feeds and free water flushes. Cont IV steroids wean. Switched from bactrim to minocycline per ID. Cont nebs. Cont insulin gtt. Cont to mon renal function. Check CPK, CD4. F/u CMV PCR, fungitell, rituximab anti IgG. Start PT/OT.     GI Consult: Acute on Chronic Anemia:   Plan: Will plan for to evaluate with EGD later this week; timing TBD. Continue on Protonix 40 mg IV BID. Hold AP/AC; avoid use of all NSAIDs. Trend H&H; transfuse for goal of >7.0. Monitor hemodynamics; avoid episodes of hypotension. Monitor ostomy output; alert GI team of signs of overt GIB.    Vital Signs: /74   Pulse (!) 122   Temp 99.1 °F (37.3 °C) (Oral)   Resp 21   Ht 5' 8\" (1.727 m)   Wt 72.7 kg (160 lb 4.4 oz)   SpO2 98%   BMI 24.37 kg/m²       Pertinent Labs/Diagnostic Results:       Results from last 7 days   Lab Units 03/19/24  0543 03/18/24  2252 03/18/24  1620 03/18/24  0533 03/18/24  0429 03/17/24  0800 03/17/24  0451   WBC Thousand/uL 9.92  --   --   --  8.39  8.39  --  11.78*   HEMOGLOBIN g/dL 9.5* 9.3* 9.9* 5.3* 5.6*  5.6*   < > 6.8*   HEMATOCRIT % 28.1* 27.0* 29.4* 16.3* 17.6*  17.6*  --  21.5*   PLATELETS Thousands/uL 86*  --   --   --  89*  89*  --  124*   BANDS PCT % 7  --   --   --  5  --   --     < > = values in this interval not displayed.     Results from last 7 days   Lab Units 03/18/24  0429   RETIC CT ABS  87,700   RETIC CT PCT % 4.90*     Results from last 7 days   Lab Units 03/19/24  0543 03/18/24  0429 03/17/24  0451 03/16/24  0611 " "03/15/24  1700 03/15/24  0603 03/14/24  0542 03/13/24  0449   SODIUM mmol/L 138 135 137 137  --  138 145 144   POTASSIUM mmol/L 4.7 4.1 4.3 3.9  --  3.3* 3.2* 4.0   CHLORIDE mmol/L 107 107 105 107  --  109* 112* 114*   CO2 mmol/L 17* 18* 19* 18*  --  18* 19* 19*   ANION GAP mmol/L 14* 10 13 12  --  11 14* 11   BUN mg/dL 85* 81* 71* 65*  --  61* 59* 53*   CREATININE mg/dL 1.79* 1.89* 1.65* 1.65*  --  1.59* 1.55* 1.28   EGFR ml/min/1.73sq m 30 28 33 33  --  35 36 46   CALCIUM mg/dL 8.4 8.0* 8.2* 8.2*  --  7.6* 7.7* 7.7*   CALCIUM, IONIZED mmol/L  --  1.20  --   --  1.14  --  1.12 1.12   MAGNESIUM mg/dL 2.3 2.3 2.2 2.1  --  1.9 2.0 2.0   PHOSPHORUS mg/dL 4.0 4.0 3.6 3.6  --  3.8 4.0  4.0 3.9     Results from last 7 days   Lab Units 03/19/24  0543 03/18/24  1214   AST U/L 29  --    ALT U/L 64*  --    ALK PHOS U/L 141*  --    TOTAL PROTEIN g/dL 4.6*  --    ALBUMIN g/dL 2.5*  --    TOTAL BILIRUBIN mg/dL 0.47  --    AMMONIA umol/L  --  51     Results from last 7 days   Lab Units 03/19/24  1223 03/19/24  1056 03/19/24  0834 03/19/24  0548 03/19/24  0435 03/19/24  0218 03/19/24  0010 03/18/24  2203 03/18/24  2009 03/18/24  1715 03/18/24  1619 03/18/24  1333   POC GLUCOSE mg/dl 224* 215* 176* 178* 158* 109 80 126 138 206* 189* 177*     Results from last 7 days   Lab Units 03/19/24  0543 03/18/24  0429 03/17/24  0451 03/16/24  0611 03/15/24  0603 03/14/24  0542 03/13/24  0449   GLUCOSE RANDOM mg/dL 207* 139 143* 110 133 141* 123             No results found for: \"BETA-HYDROXYBUTYRATE\"   Results from last 7 days   Lab Units 03/17/24  0812 03/14/24  1840 03/14/24  1124   PH ART  7.375 7.330* 7.346*   PCO2 ART mm Hg 32.1* 36.3 34.8*   PO2 ART mm Hg 64.4* 90.7 73.4*   HCO3 ART mmol/L 18.4* 18.7* 18.6*   BASE EXC ART mmol/L -6.1 -6.6 -6.4   O2 CONTENT ART mL/dL 10.5* 11.2* 10.9*   O2 HGB, ARTERIAL % 90.3* 95.6 93.6*   ABG SOURCE  Line, Arterial Line, Arterial Line, Arterial     Results from last 7 days   Lab Units 03/19/24  1237 "   PH NICA  7.242*   PCO2 NICA mm Hg 40.2*   PO2 NICA mm Hg 51.0*   HCO3 NICA mmol/L 16.9*   BASE EXC NICA mmol/L -9.8   O2 CONTENT NICA ml/dL 11.5   O2 HGB, VENOUS % 81.7*                                 Results from last 7 days   Lab Units 03/17/24  1604   LACTIC ACID mmol/L 1.6                         Results from last 7 days   Lab Units 03/19/24  0555 03/13/24  0555   UNIT PRODUCT CODE  [id number]  [id number]  [id number]   UNIT NUMBER  [id number]  [id number]  [id number]   UNITABO  A  A O  A   UNITRH  NEG  NEG NEG  NEG   CROSSMATCH  Compatible  Compatible Compatible  Compatible   UNIT DISPENSE STATUS  Presumed Trans  Presumed Trans Presumed Trans  Presumed Trans   UNIT PRODUCT VOL mL 350  350 350  350             Results from last 7 days   Lab Units 03/19/24  0543 03/18/24  0429 03/17/24  0451 03/16/24  0611 03/15/24  0603   CRP mg/L 41.7* 38.2* 78.9* 13.4* 18.6*             Results from last 7 days   Lab Units 03/17/24  1753   SODIUM UR  40             Results from last 7 days   Lab Units 03/14/24  0542 03/13/24  0449   VANCOMYCIN RM ug/mL 23.7* 27.9*       Medications:   Scheduled Medications:  chlorhexidine, 15 mL, Mouth/Throat, Q12H SARTHAK  heparin (porcine), 5,000 Units, Subcutaneous, Q8H SARTHAK  ipratropium, 0.5 mg, Nebulization, TID  lamoTRIgine, 150 mg, Oral, BID  levalbuterol, 1.25 mg, Nebulization, TID  lidocaine (PF), 10 mL, Inhalation, Once  methylPREDNISolone sodium succinate, 40 mg, Intravenous, Q6H SARTHAK   Followed by  [START ON 3/20/2024] methylPREDNISolone sodium succinate, 30 mg, Intravenous, Q6H SARTHAK   Followed by  [START ON 3/23/2024] methylPREDNISolone sodium succinate, 20 mg, Intravenous, Q6H SARTHAK   Followed by  [START ON 3/26/2024] methylPREDNISolone sodium succinate, 10 mg, Intravenous, Q6H SARTHAK   Followed by  [START ON 3/29/2024] methylPREDNISolone sodium succinate, 10 mg, Intravenous, Q8H SARTHAK   Followed by  [START ON 4/1/2024] methylPREDNISolone sodium  succinate, 10 mg, Intravenous, Q12H SARTHAK   Followed by  [START ON 4/8/2024] methylPREDNISolone sodium succinate, 10 mg, Intravenous, Daily  minocycline, 200 mg, Per NG Tube, BID  modafinil, 100 mg, Per NG Tube, Daily  nystatin, , Topical, BID  pantoprazole, 40 mg, Intravenous, Q12H SARTHAK  polyethylene glycol, 17 g, Per NG Tube, Daily  sodium chloride, 2 spray, Each Nare, Q4H  sodium chloride, 4 mL, Nebulization, TID  topiramate, 50 mg, Per PEG Tube, BID  venlafaxine, 12.5 mg, Oral, TID With Meals      Continuous IV Infusions:  insulin regular (HumuLIN R,NovoLIN R) 1 Units/mL in sodium chloride 0.9 % 100 mL infusion, 0.3-21 Units/hr, Intravenous, Titrated      PRN Meds:  acetaminophen, 650 mg, Oral, Q6H PRN  fentaNYL, 50 mcg, Intravenous, Q1H PRN  ondansetron, 4 mg, Intravenous, Q6H PRN  sodium chloride, 1 Application, Nasal, Q1H PRN        Discharge Plan: D    Network Utilization Review Department  ATTENTION: Please call with any questions or concerns to [phone] and carefully listen to the prompts so that you are directed to the right person. All voicemails are confidential.   For Discharge needs, contact Care Management DC Support Team at [phone] opt. 2  Send all requests for admission clinical reviews, approved or denied determinations and any other requests to dedicated fax number below belonging to the campus where the patient is receiving treatment. List of dedicated fax numbers for the Facilities:  FACILITY NAME UR FAX NUMBER   ADMISSION DENIALS (Administrative/Medical Necessity) [phone]   DISCHARGE SUPPORT TEAM (NETWORK) [phone]   PARENT CHILD HEALTH (Maternity/NICU/Pediatrics) [phone]   General acute hospital [phone]   [facility] [phone]   [facility] [phone]   [facility] [phone]   [facility] [phone]   [facility]  [phone]   [facility] [phone]   [facility] [phone]   [facility] [phone]   [facility] [phone]   [facility] [phone]   [facility] [phone]

## 2024-03-19 NOTE — CONSULTS
Consultation -  [facility]  Carla Hunt 59 y.o. female MRN: [id number]  Unit/Bed#: [facility] 12 Encounter: [id number]            Inpatient consult to gastroenterology     Date/Time  3/19/2024 9:58 AM     Performed by  Esthela Wolf DO   Authorized by  Kassy Mahajan DO             Reason for Consult / Principal Problem: Anemia      ASSESSMENT AND PLAN:      Ms. Carla Hunt is a 59-year-old female with a past medical history of B-cell lymphoma, seizure disorder status post partial left temporal lobe resection, CKD, and diabetes mellitus type 2 who was initially admitted on 1/10/2024 with acute encephalopathy and hypoxic respiratory failure in the setting of COVID-19.      Acute on Chronic Anemia  Thrombocytopenia  Prior Cecal Volvulus  Acute Hypoxic Respiratory Failure S/P Tracheostomy  B Cell Lymphoma on Rituximab    # Acute on Chronic Anemia: Patient with prolonged hospitalization after presenting with acute hypoxic respiratory failure in setting of COVID-19.  Hospital course has been complicated by acute on chronic anemia.  Patient noted to have intermittent drops in hemoglobin down to less than 6 requiring PRBC transfusion.  Status post 6 units PRBC total this admission.  No evidence of significant overt GI bleeding and patient otherwise hemodynamically stable and not requiring any vasopressor support.  Yesterday, patient did have a drop in hemoglobin down to 5.3 however this overcorrected to 9.9 following 2 unit PRBC. Patient also with intermittent bloody ostomy output but no evidence at time of consultation.  Given chronic anemia with intermittent bleeding, patient would benefit from upper endoscopic evaluation.  However given patient's clinical stability and hemoglobin improvement, no emergent need at this time.  Differential does include gastritis, peptic ulcer disease, AVM, or possible dieulafoy lesion.  Will plan for endoscopic evaluation later this week but in the interim on  conservative management and close hemodynamic monitoring.    Plan:  Will plan for to evaluate with EGD; place NPO at midnight  Continue on Protonix 40 mg IV BID  Hold AP/AC; avoid use of all NSAIDs  Trend H&H; transfuse for goal of >7.0   Monitor hemodynamics; avoid episodes of hypotension   Maintain adequate IV access with 2 large bore Ivs  Monitor ostomy output; alert GI team of signs of overt GIB  Additional pain and symptom management per primary team     # Cecal Volvulus: Patient's hospital course complicated by cecal volvulus.  Patient now status post OR with ex lap ileocecectomy and end ileostomy on 2/20.  Complicated by wound dehiscence status post wound VAC placement.  Current patient currently with pink stoma and dark green output from ostomy bag.  However, intermittent blood noted in ostomy by nursing.  ______________________________________________________________________    HPI:  Ms. Carla Hunt is a 59-year-old female with a past medical history of B-cell lymphoma, seizure disorder status post partial left temporal lobe resection, CKD, and diabetes mellitus type 2 who was initially admitted on 1/10/2024 with acute encephalopathy and hypoxic respiratory failure in the setting of COVID-19.  She has had a protracted and complicated hospital course.  Throughout hospitalization, she has required multiple intubations and is now status post tracheostomy along with MDRO and stenotrophomonas bacterial pneumonia.  Hospital course also complicated by cecal volvulus status post hemicolectomy with ostomy.  GI now consulted for acute anemia without evidence of overt bleeding.    Throughout admission, patient with a baseline hemoglobin of 7-8 and has had occasional drops in hemoglobin down to less than 6 throughout hospitalization.  Yesterday on 3/18, patient had a drop in hemoglobin down to 5.6.  Repeat hemoglobin found to be 5.3.  Status post 2 units with overcorrection in hemoglobin to 9.9.  Since  admission, patient has received 6 units pRBC total.  Per nursing, overnight, patient did have evidence of blood in ostomy bag full however output dark green at time of the evaluation.    REVIEW OF SYSTEMS:    CONSTITUTIONAL: Denies any fever, chills, rigors, and weight loss.  HEENT: No earache or tinnitus. Denies hearing loss or visual disturbances.  CARDIOVASCULAR: No chest pain or palpitations.   RESPIRATORY: Denies any cough, hemoptysis, shortness of breath or dyspnea on exertion.  GASTROINTESTINAL: As noted in the History of Present Illness.   GENITOURINARY: No problems with urination. Denies any hematuria or dysuria.  NEUROLOGIC: No dizziness or vertigo, denies headaches.   MUSCULOSKELETAL: Denies any muscle or joint pain.   SKIN: Denies skin rashes or itching.   ENDOCRINE: Denies excessive thirst. Denies intolerance to heat or cold.  PSYCHOSOCIAL: Denies depression or anxiety. Denies any recent memory loss.       Historical Information   Past Medical History:   Diagnosis Date    Hx of hypercalcemia     Lymphoma (HCC) 2021    Parathyroid disease (HCC)     Seizures (HCC)     Situational depression     24 yr old son  from drug overdose     Past Surgical History:   Procedure Laterality Date    CRANIOTOMY FOR TEMPORAL LOBECTOMY Left     UT LAPS ABD PRTM&OMENTUM DX W/WO SPEC BR/WA SPX N/A 2024    Procedure: LAPAROSCOPY DIAGNOSTIC,EXPLORATORY LAPAROTOMY, RIGHT KARY COLECTOMY,ILEOSTOMY, MUCUS FISTULA;  Surgeon: Lauryn Ullrich, DO;  Location: [facility];  Service: General     Social History   Social History     Substance and Sexual Activity   Alcohol Use Not Currently     Social History     Substance and Sexual Activity   Drug Use Never     Social History     Tobacco Use   Smoking Status Never   Smokeless Tobacco Never     Family History   Problem Relation Age of Onset    Diabetes Mother     Cancer Father        Meds/Allergies     Medications Prior to Admission   Medication    busPIRone (BUSPAR) 5 mg  tablet    escitalopram (LEXAPRO) 10 mg tablet    ibuprofen (MOTRIN) 600 mg tablet    lamoTRIgine (LaMICtal) 200 MG tablet    lamoTRIgine (LaMICtal) 200 MG tablet    medroxyPROGESTERone acetate (DEPO-PROVERA SYRINGE) 150 mg/mL injection    ondansetron (ZOFRAN-ODT) 4 mg disintegrating tablet    topiramate (TOPAMAX) 100 mg tablet    topiramate (TOPAMAX) 100 mg tablet    venlafaxine (EFFEXOR-XR) 75 mg 24 hr capsule     Current Facility-Administered Medications   Medication Dose Route Frequency    acetaminophen (TYLENOL) tablet 650 mg  650 mg Oral Q6H PRN    chlorhexidine (PERIDEX) 0.12 % oral rinse 15 mL  15 mL Mouth/Throat Q12H SARTHAK    fentaNYL injection 50 mcg  50 mcg Intravenous Q1H PRN    heparin (porcine) subcutaneous injection 5,000 Units  5,000 Units Subcutaneous Q8H SARTHAK    insulin regular (HumuLIN R,NovoLIN R) 1 Units/mL in sodium chloride 0.9 % 100 mL infusion  0.3-21 Units/hr Intravenous Titrated    ipratropium (ATROVENT) 0.02 % inhalation solution 0.5 mg  0.5 mg Nebulization TID    lamoTRIgine (LaMICtal) tablet 150 mg  150 mg Oral BID    levalbuterol (XOPENEX) inhalation solution 1.25 mg  1.25 mg Nebulization TID    lidocaine (PF) (XYLOCAINE-MPF) 2 % injection 10 mL  10 mL Inhalation Once    methylPREDNISolone sodium succinate (Solu-MEDROL) injection 40 mg  40 mg Intravenous Q6H SARTHAK    Followed by    [START ON 3/20/2024] methylPREDNISolone sodium succinate (Solu-MEDROL) injection 30 mg  30 mg Intravenous Q6H SARTHAK    Followed by    [START ON 3/23/2024] methylPREDNISolone sodium succinate (Solu-MEDROL) injection 20 mg  20 mg Intravenous Q6H SARTHAK    Followed by    [START ON 3/26/2024] methylPREDNISolone sodium succinate (Solu-MEDROL) injection 10 mg  10 mg Intravenous Q6H SARTHAK    Followed by    [START ON 3/29/2024] methylPREDNISolone sodium succinate (Solu-MEDROL) injection 10 mg  10 mg Intravenous Q8H SARTHAK    Followed by    [START ON 4/1/2024] methylPREDNISolone sodium succinate (Solu-MEDROL) injection 10 mg  10 mg  "Intravenous Q12H SARTHAK    Followed by    [START ON 4/8/2024] methylPREDNISolone sodium succinate (Solu-MEDROL) injection 10 mg  10 mg Intravenous Daily    minocycline (DYNACIN) tablet 200 mg  200 mg Per NG Tube BID    modafinil (PROVIGIL) tablet 100 mg  100 mg Per NG Tube Daily    multi-electrolyte (ISOLYTE-S PH 7.4) bolus 1,000 mL  1,000 mL Intravenous Once    nystatin (MYCOSTATIN) powder   Topical BID    ondansetron (ZOFRAN) injection 4 mg  4 mg Intravenous Q6H PRN    pantoprazole (PROTONIX) injection 40 mg  40 mg Intravenous Q12H SARTHAK    polyethylene glycol (MIRALAX) packet 17 g  17 g Per NG Tube Daily    sodium chloride (AYR SALINE NASAL) nasal gel 1 Application  1 Application Nasal Q1H PRN    sodium chloride (OCEAN) 0.65 % nasal spray 2 spray  2 spray Each Nare Q4H    sodium chloride 3 % inhalation solution 4 mL  4 mL Nebulization TID    topiramate (TOPAMAX) 6 mg/ml oral suspension 50 mg  50 mg Per PEG Tube BID    venlafaxine (EFFEXOR) tablet 12.5 mg  12.5 mg Oral TID With Meals       No Known Allergies        Objective     Blood pressure 126/74, pulse (!) 130, temperature 99.1 °F (37.3 °C), temperature source Oral, resp. rate (!) 23, height 5' 8\" (1.727 m), weight 72.7 kg (160 lb 4.4 oz), SpO2 98%. Body mass index is 24.37 kg/m².      Intake/Output Summary (Last 24 hours) at 3/19/2024 0952  Last data filed at 3/19/2024 0935  Gross per 24 hour   Intake 4850.32 ml   Output 2130 ml   Net 2720.32 ml         PHYSICAL EXAM:      General Appearance:   Alert, cooperative, no distress   HEENT:   Normocephalic, atraumatic, anicteric.     Neck:  Supple, symmetrical, trachea midline   Lungs:   Clear to auscultation bilaterally; no rales, rhonchi or wheezing; respirations unlabored    Heart::   Regular rate and rhythm; no murmur, rub, or gallop.   Abdomen:   Soft, non-tender, non-distended; normal bowel sounds; no masses, no organomegaly    Genitalia:   Deferred    Rectal:   Deferred    Extremities:  No cyanosis, clubbing or " edema    Pulses:  2+ and symmetric all extremities    Skin:  No jaundice, rashes, or lesions    Lymph nodes:  No palpable cervical lymphadenopathy        Lab Results:   No results displayed because visit has over 200 results.          Imaging Studies: I have personally reviewed pertinent imaging studies.

## 2024-03-19 NOTE — ASSESSMENT & PLAN NOTE
PSC, including MSW support, will follow closely to continue to provide supportive care as clinical situation evolves.   Pastoral care consulted  Decisional apparatus:  Patient is not competent on my exam today.  If competence is lost, patient's substitute decision maker would default to spouse Min by [place] Act 169.  Advance Directive / Living Will / POLST:  None on file, unable to complete

## 2024-03-19 NOTE — PHYSICAL THERAPY NOTE
PT Treatment       03/19/24 1201   PT Last Visit   PT Visit Date 03/19/24   Note Type   Note Type Treatment   Pain Assessment   Pain Assessment Tool FLACC   Pain Rating: FLACC (Rest) - Face 0   Pain Rating: FLACC (Rest) - Legs 0   Pain Rating: FLACC (Rest) - Activity 0   Pain Rating: FLACC (Rest) - Cry 0   Pain Rating: FLACC (Rest) - Consolability 0   Score: FLACC (Rest) 0   Pain Rating: FLACC (Activity) - Face 0   Pain Rating: FLACC (Activity) - Legs 1   Pain Rating: FLACC (Activity) - Activity 0   Pain Rating: FLACC (Activity) - Cry 0   Pain Rating: FLACC (Activity) - Consolability 0   Score: FLACC (Activity) 1   Restrictions/Precautions   Weight Bearing Precautions Per Order No   Braces or Orthoses Other (Comment)  (b/l resting hand splints)   Other Precautions Contact/isolation;Airborne/isolation;Cognitive;Bed Alarm;Multiple lines;Telemetry;O2;Fall Risk;Pain   General   Chart Reviewed Yes   Family/Caregiver Present No   Cognition   Overall Cognitive Status Impaired   Arousal/Participation Sedated;Persistent stimuli required   Attention WILL   Orientation Level Unable to assess   Memory Unable to assess   Following Commands Unable to follow one step commands   Comments a few nods yes or no given during treatment today   Subjective   Subjective Pt's eyes are closed upon arrival   Bed Mobility   Supine to Sit 1  Dependent   Additional items Assist x 2;Increased time required;Verbal cues;LE management  (trunk support)   Sit to Supine 1  Dependent   Additional items Assist x 2;Increased time required;Impulsive;Verbal cues;LE management  (trunk support)   Additional Comments HR remained stable while sitting EOB for 20 minutes, pt used head nods to communicate when she was ready to lay down   Transfers   Sit to Stand Unable to assess   Ambulation/Elevation   Gait pattern Not appropriate   Balance   Static Sitting Poor -   Dynamic Sitting Poor -   Endurance Deficit   Endurance Deficit Yes   Endurance Deficit  Description limited due to fatigue and generalized weakness   Activity Tolerance   Activity Tolerance Patient limited by fatigue;Other (Comment)  (weakness)   Exercises   Knee AROM Long Arc Quad Sitting;10 reps;Bilateral;PROM   Balance training  sitting EOB w/ max assist for 20 mins   Assessment   Prognosis Fair   Problem List Decreased strength;Decreased range of motion;Decreased endurance;Impaired balance;Decreased mobility;Decreased cognition;Decreased coordination;Impaired judgement;Decreased safety awareness;Decreased skin integrity;Pain   Assessment Pt attended to staff upon arrival. PT session focused on bed mobility, sitting balance/tolerance. Pt did tolerate treatment well today and was able to communicate when she was fatigued. Pt was able to perform a supine to sit dependent x 2 (LE management and trunk support). Sat EOB w/ max assist x 1 while performing UE exercises. Pt was able to tolerate sitting EOB for 20 minutes, vitals remained stable. Pt used head nods to communicate and eyes remained open more than compared to last treatment. Pt would continue to benefit from skilled PT. Pt will continue to be seen upon admission. Pt's prognosis is Fair secondary to age, medical complications, PLOF, and PMH. The patient's AM-PAC Basic Mobility Inpatient Standardized Score is less than 42.9, suggesting this patient may benefit from discharge to post-acute rehabilitation services. Please also refer to the recommendation of the Physical Therapist for safe discharge planning.   Goals   Patient Goals to lay back down   LTG Expiration Date 04/02/24   Long Term Goal #1 Goals still appropriate due to medical complications, will be extended 2 more weeks. In 14 days, pt will: 1) Perform bed mobility w/ min assist x 1 to participate in frequent repositioning and improve skin integrity 2) PT to see for transfers as appropriate 3) Improve b/l LE strength by 1/2 grade in order to improve efficiency of tranfers 4) Improve  sitting and standing balance by 1 grade to improve safety and independence   Plan   Treatment/Interventions Functional transfer training;LE strengthening/ROM;Therapeutic exercise;Endurance training;Cognitive reorientation;Equipment eval/education;Bed mobility;Gait training;Spoke to nursing;OT;Family   Progress Slow progress, decreased activity tolerance   PT Frequency 2-3x/wk   Discharge Recommendation   Rehab Resource Intensity Level, PT I (Maximum Resource Intensity)   AM-PAC Basic Mobility Inpatient   Turning in Flat Bed Without Bedrails 1   Lying on Back to Sitting on Edge of Flat Bed Without Bedrails 1   Moving Bed to Chair 1   Standing Up From Chair Using Arms 1   Walk in Room 1   Climb 3-5 Stairs With Railing 1   Basic Mobility Inpatient Raw Score 6   Turning Head Towards Sound 1   Follow Simple Instructions 1   Low Function Basic Mobility Raw Score  8   Low Function Basic Mobility Standardized Score  10.37   [facility] Highest Level Of Mobility   -HLM Goal 2: Bed activities/Dependent transfer   JH-HLM Achieved 3: Sit at edge of bed     Karlene Mata, SPT

## 2024-03-19 NOTE — PROGRESS NOTES
Progress Note - Infectious Disease   Carla Hunt 59 y.o. female MRN: [id number]  [facility] [id number]      Impression/Plan:    1. Acute hypoxic respiratory failure, likely multifactorial, with both COVID and bacterial pneumonia contributing.  Also consider persistent inflammation, fibrosis as seems quite steroid responsive in past.  Most recently extubated 3/1.  Patient with worsening respiratory status requiring intubation again 3/11.  Suspect ongoing hypoxia related to persistent COVID-pneumonia, superimposed bacterial infection, inflammatory component/lung fibrosis related to COVID, now with profound deconditioning and muscle weakness.  Status post bronchoscopy and trach 3/12 with excessive secretions seen from the lower lobes bilaterally.  BAL culture from 3/11 shows heavy growth of Stenotrophomonas maltophilia.  PJP DFA negative. While the patient has grown this before and she certainly may be colonized, given worsening CT findings, intubation and prolonged steroids would treat as a true pathogen.  Status post 10 days of vancomycin and cefepime.  Started on Bactrim 3/13, switched to minocycline 3/18.  Patient completed 14-day course of remdesivir/Paxlovid 3/15.  CRP improving, patient is on PS but continues to have profound weakness and poor mental status.  Status post repeat bronchoscopy 3/17 for airway clearance with continued thick secretions.  Low concern for uncontrolled infection contributing to clinical picture.  -Continue p.o. minocycline 200 mg twice daily via NG tube.  Tube feeds should be held for 1 hour prior to and 1 hour after minocycline administration. E test requested by the micro lab  -Steroid dosing per critical care, tolerating weaning with improving CRP  -No indication to treat Candida in respiratory culture, this is respiratory colonization  -Trend CRP  -If clinically deteriorates with concern for progressive sepsis would add meropenem 1g IV Q8 to the  Bactrim  -follow up CMV PCR     2. SIRS versus sepsis.  Fluctuating fever, WBC.  Fevers may be multifactorial due to COVID (still positive antigen and PCR) versus inflammation (seem to correlate to steroid dosing).  Also consider developing bacterial infection.  Recent blood cultures 2/26 negative.  CT C/A/P 3/10 shows stable groundglass and nodular opacities, inflammation around stoma. Now with dehiscence of her abdominal wound following staple removal, status post wound VAC placement 3/13. Patient afebrile, without leukocytosis  -antibiotics as above  -Follow temperatures closely  -Recheck WBC in AM to monitor infection  -Supportive care as per the primary service     3. Recurrent bacterial pneumonia.  The patient has completed multiple treatment courses over the hospitalization.  Most recent BAL culture with Pseudomonas and stenotrophomonas.  Unclear if pathogens or colonizers.  Status post 10 days levofloxacin.  CT C/A/P showed evolving changes likely all due to sequelae of COVID, infections.  Noted to have worsening hypoxia and purulent secretions on bronchoscopy 3/11.  BAL culture with heavy growth of Stenotrophomonas maltophilia, Candida              -Antibiotics as above              -Wean O2 as able     4. Severe COVID, present on admission.  Patient was treated with a 10-day course of remdesivir, dexamethasone and was given 1 dose of Tocilizumab on admission.  COVID antigen was negative prior to coming off isolation.  Had positive COVID PCR from BAL 2/16, status post another 5-day course of remdesivir.  Patient has remained on high-dose systemic corticosteroid throughout her hospitalization which were recently intensified 2/26 for fevers.  Patient having persistent fevers, hypoxia.  COVID antigen positive and PCR is also positive 3/3 and Ct 26.8, consistent with high viral replication.  Repeat PCR positive with Ct now closer to 30. CRP overall decreasing with slow steroid wean.  Status post 14-day course  of remdesivir/Paxlovid 3/15/2024  -Steroid wean per ICU  -No additional antivirals indicated  -Will repeat rapid COVID antigen 3/22 to see if we can remove isolation     5. 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  Patient is status post exploratory laparotomy with ileocecectomy and end ileostomy creation 2/20.   End ileostomy is with ongoing ischemic changes which is likely contributing to the imaging findings and ongoing SIRS response.  Now with wound dehiscence status post staple removal, status post wound VAC placement 3/13, wound VAC now removed  -Serial exams  -Surgery follow-up   -no current signs of infection, need for antibiotics      B-cell lymphoma, on maintenance rituxan, which is currently postponed.  Rituximab is a risk factor for persistent COVID infection.    7.  ELIESER.  More likely due to hypovolemia since creatinine was increasing prior to starting Bactrim.   -Monitor creatinine closely   -Dose adjust antibiotics as needed    8.  Anemia, thrombocytopenia, lymphopenia.  Patient has had persistent lymphopenia throughout this admission, likely due to rituximab, viral infection, high-dose steroids.  Now with worsening anemia and thrombocytopenia.  Bactrim discontinued 3/18   -Will discontinue Bactrim in case this is contributing to cytopenias   -Steroid wean per primary   -Transfusion support per primary   -Follow-up repeat CT imaging   -Consider hematology consult   -Follow-up CMV PCR    Above management plan to continue p.o. minocycline discussed with the critical care attending Dr. Vazquez who is in agreement.  Discussed with the  at bedside. ID will follow.    Antibiotics:  Day 2 minocycline (day 7 total)    Subjective:  The patient opens her eyes to voice, per the  she seemed more awake this morning.  She remains on PS, which is being weaned.  Patient not requiring pressors.  Hemoglobin is stable today.  No fevers.    Objective:  Vitals:  Temp:  [97.5 °F (36.4 °C)-99.1 °F  (37.3 °C)] 99.1 °F (37.3 °C)  HR:  [109-138] 122  Resp:  [18-28] 21  BP: (105-139)/(55-77) 128/74  SpO2:  [97 %-99 %] 98 %  Temp (24hrs), Av.2 °F (36.8 °C), Min:97.5 °F (36.4 °C), Max:99.1 °F (37.3 °C)  Current: Temperature: 99.1 °F (37.3 °C)    Physical Exam:   General Appearance:  Ill-appearing, lethargic but now awakens to voice   Neck: Trach in place   Lungs:   Rhonchi bilaterally   Heart:  RRR; no murmur, rub or gallop   Abdomen:   Midline wound dehiscence with wound VAC in place, no surrounding erythema   Extremities: No edema   Skin: No new rashes or lesions.        Labs:   All pertinent labs and imaging studies were personally reviewed  Results from last 7 days   Lab Units 24  0543 24  2252 24  1620 24  0533 24  0429 24  0800 24  0451   WBC Thousand/uL 9.92  --   --   --  8.39  8.39  --  11.78*   HEMOGLOBIN g/dL 9.5* 9.3* 9.9*   < > 5.6*  5.6*   < > 6.8*   PLATELETS Thousands/uL 86*  --   --   --  89*  89*  --  124*    < > = values in this interval not displayed.     Results from last 7 days   Lab Units 24  0543 24  0429 24  0451   SODIUM mmol/L 138 135 137   POTASSIUM mmol/L 4.7 4.1 4.3   CHLORIDE mmol/L 107 107 105   CO2 mmol/L 17* 18* 19*   BUN mg/dL 85* 81* 71*   CREATININE mg/dL 1.79* 1.89* 1.65*   EGFR ml/min/1.73sq m 30 28 33   CALCIUM mg/dL 8.4 8.0* 8.2*   AST U/L 29  --   --    ALT U/L 64*  --   --    ALK PHOS U/L 141*  --   --            Results from last 7 days   Lab Units 24  0543 24  0429 24  0451 24  0611 03/15/24  0603 24  0542 24  0449   CRP mg/L 41.7* 38.2* 78.9* 13.4* 18.6* 32.3* 73.6*                     Imaging:  CT chest, abdomen, pelvis personally reviewed and shows persistent bilateral groundglass nodular consolidation

## 2024-03-19 NOTE — RESPIRATORY THERAPY NOTE
RT Ventilator Management Note  Carla Hunt 59 y.o. female MRN: [id number]  Unit/Bed#: [facility] 12 Encounter: [id number]      Daily Screen         3/18/2024  0757 3/18/2024  1900          Patient safety screen outcome:: Passed Passed                Physical Exam:   Assessment Type: Assess only  General Appearance: Sleeping  Respiratory Pattern: Assisted, Spontaneous  Chest Assessment: Chest expansion symmetrical  Bilateral Breath Sounds: Coarse  Cough: None  Suction: Trach  O2 Device: G5      Resp Comments: (P) pt remains on spont vent mode at this time. no changes amde will continue to monitor per protocol.

## 2024-03-19 NOTE — PLAN OF CARE
Problem: PHYSICAL THERAPY ADULT  Goal: Performs mobility at highest level of function for planned discharge setting.  See evaluation for individualized goals.  Description:    Equipment Recommended:  (none)       See flowsheet documentation for full assessment, interventions and recommendations.  3/19/2024 1559 by Karlene Mata  Outcome: Progressing  Note: Prognosis: Fair  Problem List: Decreased strength, Decreased range of motion, Decreased endurance, Impaired balance, Decreased mobility, Decreased cognition, Decreased coordination, Impaired judgement, Decreased safety awareness, Decreased skin integrity, Pain  Assessment: Pt attended to staff upon arrival. PT session focused on bed mobility, sitting balance/tolerance. Pt did tolerate treatment well today and was able to communicate when she was fatigued. Pt was able to perform a supine to sit dependent x 2 (LE management and trunk support). Sat EOB w/ max assist x 1 while performing UE exercises. Pt was able to tolerate sitting EOB for 20 minutes, vitals remained stable. Pt used head nods to communicate and eyes remained open more than compared to last treatment. Pt would continue to benefit from skilled PT. Pt will continue to be seen upon admission. Pt's prognosis is Fair secondary to age, medical complications, PLOF, and PMH. The patient's AM-PAC Basic Mobility Inpatient Standardized Score is less than 42.9, suggesting this patient may benefit from discharge to post-acute rehabilitation services. Please also refer to the recommendation of the Physical Therapist for safe discharge planning.  Barriers to Discharge: None     Rehab Resource Intensity Level, PT: I (Maximum Resource Intensity)    See flowsheet documentation for full assessment.

## 2024-03-19 NOTE — PROCEDURES
POC Cardiac US    Date/Time: 1/10/2024 7:41 PM    Performed by: Nigel Escobar DO  Authorized by: Nigel Escobar DO    Patient location:  ICU  Other Assisting Provider: No    Procedure details:     Exam Type:  Diagnostic    Indications: suspected volume depletion      Assessment / Evaluation for: cardiac function, pericardial effusion, inferior vena cava for fluid responsiveness, intravascular volume status and right heart strain (suspected pulmonary embolism)      Exam Type: initial exam      Image quality: diagnostic      Image availability:  Images available in PACS  Patient Details:     Cardiac Rhythm:  Regular    Mechanical ventilation: Yes    Cardiac findings:     Echo technique: M-mode      Views obtained: parasternal long axis      Pericardial effusion: absent      Wall motion: hyperdynamic      LV systolic function: hyperdynamic      RV dilation: none    Pulmonary findings:     Left Lung Findings: left lung sliding      Right lung findings: right lung sliding      B-lines: no B-lines present    IVC findings:     IVC Size: small      IVC Inspiratory Collapse: partial      Maximum IVC Diameter (cm):  0.7    Minimum IVC Diameter (cm):  0    IVC Variability (%):  100  Interpretation:     Fluid Status:  Hypovolemic

## 2024-03-20 LAB
ABO GROUP BLD BPU: NORMAL
ANION GAP SERPL CALCULATED.3IONS-SCNC: 10 MMOL/L (ref 4–13)
ANION GAP SERPL CALCULATED.3IONS-SCNC: 11 MMOL/L (ref 4–13)
APTT PPP: 34 SECONDS (ref 23–37)
ARTERIAL PATENCY WRIST A: YES
BACTERIA BRONCH AEROBE CULT: ABNORMAL
BASE EXCESS BLDA CALC-SCNC: -10.3 MMOL/L
BASE EXCESS BLDA CALC-SCNC: -11 MMOL/L (ref -2–3)
BASE EXCESS BLDA CALC-SCNC: -9.2 MMOL/L
BLD SMEAR INTERP: NORMAL
BPU ID: NORMAL
BUN SERPL-MCNC: 87 MG/DL (ref 5–25)
BUN SERPL-MCNC: 88 MG/DL (ref 5–25)
CA-I BLD-SCNC: 1.4 MMOL/L (ref 1.12–1.32)
CALCIUM SERPL-MCNC: 8.7 MG/DL (ref 8.4–10.2)
CALCIUM SERPL-MCNC: 8.7 MG/DL (ref 8.4–10.2)
CFFFLEV: 704.4 MG/DL (ref 278–581)
CFFMA (FUNCTIONAL FIBRINOGEN MAX AMPLITUDE): 38.6 MM (ref 15–32)
CHLORIDE SERPL-SCNC: 108 MMOL/L (ref 96–108)
CHLORIDE SERPL-SCNC: 112 MMOL/L (ref 96–108)
CKA(ANGLE): 73.4 DEG (ref 63–78)
CKHR(HEPARINASE REACTION TIME): 5.5 MIN (ref 4.3–8.3)
CKK(CLOT KINETICS): 1.2 MIN (ref 0.8–2.1)
CKMA(MAX AMPLITUDE): 67.6 MM (ref 52–69)
CKR(REACTION TIME): 7.4 MIN (ref 4.6–9.1)
CMV DNA SERPL NAA+PROBE-ACNC: NOT DETECTED [IU]/ML
CO2 SERPL-SCNC: 15 MMOL/L (ref 21–32)
CO2 SERPL-SCNC: 18 MMOL/L (ref 21–32)
CREAT SERPL-MCNC: 1.54 MG/DL (ref 0.6–1.3)
CREAT SERPL-MCNC: 1.63 MG/DL (ref 0.6–1.3)
CRTMA(RAPIDTEG MAX AMPLITUDE): 67.9 MM (ref 52–70)
D DIMER PPP FEU-MCNC: 1.92 UG/ML FEU
DEPRECATED AT III PPP: >140 % OF NORMAL (ref 92–136)
ERYTHROCYTE [DISTWIDTH] IN BLOOD BY AUTOMATED COUNT: 17.9 % (ref 11.6–15.1)
ERYTHROCYTE [DISTWIDTH] IN BLOOD BY AUTOMATED COUNT: 20.5 % (ref 11.6–15.1)
FDP BLD QL AGGL: <10
FIBRINOGEN PPP-MCNC: 689 MG/DL (ref 207–520)
GFR SERPL CREATININE-BSD FRML MDRD: 34 ML/MIN/1.73SQ M
GFR SERPL CREATININE-BSD FRML MDRD: 36 ML/MIN/1.73SQ M
GLUCOSE SERPL-MCNC: 109 MG/DL (ref 65–140)
GLUCOSE SERPL-MCNC: 117 MG/DL (ref 65–140)
GLUCOSE SERPL-MCNC: 118 MG/DL (ref 65–140)
GLUCOSE SERPL-MCNC: 121 MG/DL (ref 65–140)
GLUCOSE SERPL-MCNC: 122 MG/DL (ref 65–140)
GLUCOSE SERPL-MCNC: 136 MG/DL (ref 65–140)
GLUCOSE SERPL-MCNC: 137 MG/DL (ref 65–140)
GLUCOSE SERPL-MCNC: 139 MG/DL (ref 65–140)
GLUCOSE SERPL-MCNC: 150 MG/DL (ref 65–140)
GLUCOSE SERPL-MCNC: 166 MG/DL (ref 65–140)
GLUCOSE SERPL-MCNC: 170 MG/DL (ref 65–140)
GLUCOSE SERPL-MCNC: 176 MG/DL (ref 65–140)
GLUCOSE SERPL-MCNC: 186 MG/DL (ref 65–140)
GLUCOSE SERPL-MCNC: 99 MG/DL (ref 65–140)
GRAM STN SPEC: ABNORMAL
HCO3 BLDA-SCNC: 14.4 MMOL/L (ref 24–30)
HCO3 BLDA-SCNC: 15.5 MMOL/L (ref 22–28)
HCO3 BLDA-SCNC: 16.5 MMOL/L (ref 22–28)
HCT VFR BLD AUTO: 26.7 % (ref 34.8–46.1)
HCT VFR BLD AUTO: 27.6 % (ref 34.8–46.1)
HCT VFR BLD AUTO: 30.8 % (ref 34.8–46.1)
HCT VFR BLD AUTO: 34.5 % (ref 34.8–46.1)
HCT VFR BLD CALC: 20 % (ref 34.8–46.1)
HGB BLD-MCNC: 10.4 G/DL (ref 11.5–15.4)
HGB BLD-MCNC: 11.6 G/DL (ref 11.5–15.4)
HGB BLD-MCNC: 8.5 G/DL (ref 11.5–15.4)
HGB BLD-MCNC: 9 G/DL (ref 11.5–15.4)
HGB BLDA-MCNC: 6.8 G/DL (ref 11.5–15.4)
INR PPP: 1.32 (ref 0.84–1.19)
LACTATE SERPL-SCNC: 1.2 MMOL/L (ref 0.5–2)
MAGNESIUM SERPL-MCNC: 2.4 MG/DL (ref 1.9–2.7)
MCH RBC QN AUTO: 30.7 PG (ref 26.8–34.3)
MCH RBC QN AUTO: 30.8 PG (ref 26.8–34.3)
MCHC RBC AUTO-ENTMCNC: 32.6 G/DL (ref 31.4–37.4)
MCHC RBC AUTO-ENTMCNC: 33.6 G/DL (ref 31.4–37.4)
MCV RBC AUTO: 92 FL (ref 82–98)
MCV RBC AUTO: 94 FL (ref 82–98)
NRBC BLD AUTO-RTO: 2 /100 WBCS
NRBC BLD AUTO-RTO: 3 /100 WBCS
O2 CT BLDA-SCNC: 13.2 ML/DL (ref 16–23)
O2 CT BLDA-SCNC: 15.4 ML/DL (ref 16–23)
OXYHGB MFR BLDA: 96.2 % (ref 94–97)
OXYHGB MFR BLDA: 97.1 % (ref 94–97)
PCO2 BLD: 15 MMOL/L (ref 21–32)
PCO2 BLD: 27.7 MM HG (ref 42–50)
PCO2 BLDA: 33.8 MM HG (ref 36–44)
PCO2 BLDA: 34.9 MM HG (ref 36–44)
PH BLD: 7.32 [PH] (ref 7.3–7.4)
PH BLDA: 7.28 [PH] (ref 7.35–7.45)
PH BLDA: 7.29 [PH] (ref 7.35–7.45)
PHOSPHATE SERPL-MCNC: 5.2 MG/DL (ref 2.7–4.5)
PLATELET # BLD AUTO: 72 THOUSANDS/UL (ref 149–390)
PLATELET # BLD AUTO: 85 THOUSANDS/UL (ref 149–390)
PMV BLD AUTO: 12.5 FL (ref 8.9–12.7)
PMV BLD AUTO: 12.8 FL (ref 8.9–12.7)
PO2 BLD: 57 MM HG (ref 35–45)
PO2 BLDA: 105.3 MM HG (ref 75–129)
PO2 BLDA: 99.4 MM HG (ref 75–129)
POTASSIUM BLD-SCNC: 5.3 MMOL/L (ref 3.5–5.3)
POTASSIUM SERPL-SCNC: 4.8 MMOL/L (ref 3.5–5.3)
POTASSIUM SERPL-SCNC: 4.9 MMOL/L (ref 3.5–5.3)
PROTHROMBIN TIME: 16.2 SECONDS (ref 11.6–14.5)
PS CM H2O: 5
PS CM H2O: 8
PS VENT FIO2: 40
PS VENT FIO2: 40
PS VENT PEEP: 6
PS VENT PEEP: 6
RBC # BLD AUTO: 2.93 MILLION/UL (ref 3.81–5.12)
RBC # BLD AUTO: 3.77 MILLION/UL (ref 3.81–5.12)
SAO2 % BLD FROM PO2: 88 % (ref 60–85)
SODIUM BLD-SCNC: 137 MMOL/L (ref 136–145)
SODIUM SERPL-SCNC: 137 MMOL/L (ref 135–147)
SODIUM SERPL-SCNC: 137 MMOL/L (ref 135–147)
SPECIMEN SOURCE: ABNORMAL
TPA PPP QL CHRO: 153 % OF NORMAL (ref 77–138)
UNIT DISPENSE STATUS: NORMAL
UNIT PRODUCT CODE: NORMAL
UNIT PRODUCT VOLUME: 191 ML
UNIT PRODUCT VOLUME: 203 ML
UNIT PRODUCT VOLUME: 246 ML
UNIT PRODUCT VOLUME: 250 ML
UNIT PRODUCT VOLUME: 280 ML
UNIT PRODUCT VOLUME: 300 ML
UNIT PRODUCT VOLUME: 350 ML
UNIT PRODUCT VOLUME: 350 ML
UNIT RH: NORMAL
VENT - PS: ABNORMAL
VENT - PS: ABNORMAL
WBC # BLD AUTO: 7.09 THOUSAND/UL (ref 4.31–10.16)
WBC # BLD AUTO: 7.88 THOUSAND/UL (ref 4.31–10.16)

## 2024-03-20 PROCEDURE — 82947 ASSAY GLUCOSE BLOOD QUANT: CPT

## 2024-03-20 PROCEDURE — 99233 SBSQ HOSP IP/OBS HIGH 50: CPT | Performed by: STUDENT IN AN ORGANIZED HEALTH CARE EDUCATION/TRAINING PROGRAM

## 2024-03-20 PROCEDURE — 84132 ASSAY OF SERUM POTASSIUM: CPT

## 2024-03-20 PROCEDURE — 85027 COMPLETE CBC AUTOMATED: CPT | Performed by: INTERNAL MEDICINE

## 2024-03-20 PROCEDURE — 85014 HEMATOCRIT: CPT | Performed by: STUDENT IN AN ORGANIZED HEALTH CARE EDUCATION/TRAINING PROGRAM

## 2024-03-20 PROCEDURE — 80048 BASIC METABOLIC PNL TOTAL CA: CPT

## 2024-03-20 PROCEDURE — 82948 REAGENT STRIP/BLOOD GLUCOSE: CPT

## 2024-03-20 PROCEDURE — 94664 DEMO&/EVAL PT USE INHALER: CPT

## 2024-03-20 PROCEDURE — 0JQ80ZZ REPAIR ABDOMEN SUBCUTANEOUS TISSUE AND FASCIA, OPEN APPROACH: ICD-10-PCS | Performed by: SURGERY

## 2024-03-20 PROCEDURE — 82805 BLOOD GASES W/O2 SATURATION: CPT | Performed by: STUDENT IN AN ORGANIZED HEALTH CARE EDUCATION/TRAINING PROGRAM

## 2024-03-20 PROCEDURE — 94640 AIRWAY INHALATION TREATMENT: CPT

## 2024-03-20 PROCEDURE — 94003 VENT MGMT INPAT SUBQ DAY: CPT

## 2024-03-20 PROCEDURE — 85300 ANTITHROMBIN III ACTIVITY: CPT

## 2024-03-20 PROCEDURE — 82805 BLOOD GASES W/O2 SATURATION: CPT

## 2024-03-20 PROCEDURE — 85397 CLOTTING FUNCT ACTIVITY: CPT | Performed by: INTERNAL MEDICINE

## 2024-03-20 PROCEDURE — 85576 BLOOD PLATELET AGGREGATION: CPT | Performed by: INTERNAL MEDICINE

## 2024-03-20 PROCEDURE — 85014 HEMATOCRIT: CPT

## 2024-03-20 PROCEDURE — 97605 NEG PRS WND THER DME<=50SQCM: CPT | Performed by: SURGERY

## 2024-03-20 PROCEDURE — 99233 SBSQ HOSP IP/OBS HIGH 50: CPT | Performed by: INTERNAL MEDICINE

## 2024-03-20 PROCEDURE — 85379 FIBRIN DEGRADATION QUANT: CPT

## 2024-03-20 PROCEDURE — 85384 FIBRINOGEN ACTIVITY: CPT | Performed by: INTERNAL MEDICINE

## 2024-03-20 PROCEDURE — 85018 HEMOGLOBIN: CPT

## 2024-03-20 PROCEDURE — 85018 HEMOGLOBIN: CPT | Performed by: STUDENT IN AN ORGANIZED HEALTH CARE EDUCATION/TRAINING PROGRAM

## 2024-03-20 PROCEDURE — 83605 ASSAY OF LACTIC ACID: CPT | Performed by: INTERNAL MEDICINE

## 2024-03-20 PROCEDURE — C9113 INJ PANTOPRAZOLE SODIUM, VIA: HCPCS | Performed by: INTERNAL MEDICINE

## 2024-03-20 PROCEDURE — 85347 COAGULATION TIME ACTIVATED: CPT | Performed by: INTERNAL MEDICINE

## 2024-03-20 PROCEDURE — 83735 ASSAY OF MAGNESIUM: CPT | Performed by: STUDENT IN AN ORGANIZED HEALTH CARE EDUCATION/TRAINING PROGRAM

## 2024-03-20 PROCEDURE — 82803 BLOOD GASES ANY COMBINATION: CPT

## 2024-03-20 PROCEDURE — 84295 ASSAY OF SERUM SODIUM: CPT

## 2024-03-20 PROCEDURE — 85420 FIBRINOLYTIC PLASMINOGEN: CPT

## 2024-03-20 PROCEDURE — 85384 FIBRINOGEN ACTIVITY: CPT

## 2024-03-20 PROCEDURE — 85362 FIBRIN DEGRADATION PRODUCTS: CPT

## 2024-03-20 PROCEDURE — 85610 PROTHROMBIN TIME: CPT

## 2024-03-20 PROCEDURE — 36600 WITHDRAWAL OF ARTERIAL BLOOD: CPT

## 2024-03-20 PROCEDURE — 03HY32Z INSERTION OF MONITORING DEVICE INTO UPPER ARTERY, PERCUTANEOUS APPROACH: ICD-10-PCS | Performed by: INTERNAL MEDICINE

## 2024-03-20 PROCEDURE — 99232 SBSQ HOSP IP/OBS MODERATE 35: CPT | Performed by: INTERNAL MEDICINE

## 2024-03-20 PROCEDURE — 84100 ASSAY OF PHOSPHORUS: CPT

## 2024-03-20 PROCEDURE — 85027 COMPLETE CBC AUTOMATED: CPT

## 2024-03-20 PROCEDURE — 85730 THROMBOPLASTIN TIME PARTIAL: CPT

## 2024-03-20 PROCEDURE — 4A133J1 MONITORING OF ARTERIAL PULSE, PERIPHERAL, PERCUTANEOUS APPROACH: ICD-10-PCS | Performed by: INTERNAL MEDICINE

## 2024-03-20 PROCEDURE — 4A133B1 MONITORING OF ARTERIAL PRESSURE, PERIPHERAL, PERCUTANEOUS APPROACH: ICD-10-PCS | Performed by: INTERNAL MEDICINE

## 2024-03-20 PROCEDURE — 93308 TTE F-UP OR LMTD: CPT | Performed by: INTERNAL MEDICINE

## 2024-03-20 PROCEDURE — C9113 INJ PANTOPRAZOLE SODIUM, VIA: HCPCS

## 2024-03-20 PROCEDURE — 82330 ASSAY OF CALCIUM: CPT

## 2024-03-20 PROCEDURE — 94760 N-INVAS EAR/PLS OXIMETRY 1: CPT

## 2024-03-20 RX ORDER — MINOCYCLINE HYDROCHLORIDE 50 MG/1
200 TABLET ORAL 2 TIMES DAILY
Status: DISCONTINUED | OUTPATIENT
Start: 2024-03-20 | End: 2024-03-20

## 2024-03-20 RX ORDER — SODIUM CHLORIDE, SODIUM GLUCONATE, SODIUM ACETATE, POTASSIUM CHLORIDE, MAGNESIUM CHLORIDE, SODIUM PHOSPHATE, DIBASIC, AND POTASSIUM PHOSPHATE .53; .5; .37; .037; .03; .012; .00082 G/100ML; G/100ML; G/100ML; G/100ML; G/100ML; G/100ML; G/100ML
1000 INJECTION, SOLUTION INTRAVENOUS ONCE
Status: COMPLETED | OUTPATIENT
Start: 2024-03-20 | End: 2024-03-20

## 2024-03-20 RX ORDER — METHYLPREDNISOLONE SODIUM SUCCINATE 40 MG/ML
10 INJECTION, POWDER, LYOPHILIZED, FOR SOLUTION INTRAMUSCULAR; INTRAVENOUS EVERY 12 HOURS SCHEDULED
Status: DISCONTINUED | OUTPATIENT
Start: 2024-03-26 | End: 2024-03-23

## 2024-03-20 RX ORDER — METHYLPREDNISOLONE SODIUM SUCCINATE 40 MG/ML
20 INJECTION, POWDER, LYOPHILIZED, FOR SOLUTION INTRAMUSCULAR; INTRAVENOUS EVERY 12 HOURS SCHEDULED
Status: DISCONTINUED | OUTPATIENT
Start: 2024-03-24 | End: 2024-03-23

## 2024-03-20 RX ORDER — SODIUM CHLORIDE, SODIUM GLUCONATE, SODIUM ACETATE, POTASSIUM CHLORIDE, MAGNESIUM CHLORIDE, SODIUM PHOSPHATE, DIBASIC, AND POTASSIUM PHOSPHATE .53; .5; .37; .037; .03; .012; .00082 G/100ML; G/100ML; G/100ML; G/100ML; G/100ML; G/100ML; G/100ML
1000 INJECTION, SOLUTION INTRAVENOUS ONCE
Status: DISCONTINUED | OUTPATIENT
Start: 2024-03-20 | End: 2024-03-20

## 2024-03-20 RX ORDER — METHYLPREDNISOLONE SODIUM SUCCINATE 40 MG/ML
10 INJECTION, POWDER, LYOPHILIZED, FOR SOLUTION INTRAMUSCULAR; INTRAVENOUS DAILY
Status: DISCONTINUED | OUTPATIENT
Start: 2024-03-28 | End: 2024-03-23

## 2024-03-20 RX ORDER — MINOCYCLINE HYDROCHLORIDE 50 MG/1
200 TABLET ORAL 2 TIMES DAILY
Status: COMPLETED | OUTPATIENT
Start: 2024-03-20 | End: 2024-03-27

## 2024-03-20 RX ORDER — METHYLPREDNISOLONE SODIUM SUCCINATE 40 MG/ML
30 INJECTION, POWDER, LYOPHILIZED, FOR SOLUTION INTRAMUSCULAR; INTRAVENOUS EVERY 8 HOURS SCHEDULED
Status: COMPLETED | OUTPATIENT
Start: 2024-03-20 | End: 2024-03-21

## 2024-03-20 RX ORDER — METHYLPREDNISOLONE SODIUM SUCCINATE 40 MG/ML
30 INJECTION, POWDER, LYOPHILIZED, FOR SOLUTION INTRAMUSCULAR; INTRAVENOUS EVERY 12 HOURS SCHEDULED
Status: DISCONTINUED | OUTPATIENT
Start: 2024-03-22 | End: 2024-03-23

## 2024-03-20 RX ORDER — SODIUM CHLORIDE, SODIUM GLUCONATE, SODIUM ACETATE, POTASSIUM CHLORIDE, MAGNESIUM CHLORIDE, SODIUM PHOSPHATE, DIBASIC, AND POTASSIUM PHOSPHATE .53; .5; .37; .037; .03; .012; .00082 G/100ML; G/100ML; G/100ML; G/100ML; G/100ML; G/100ML; G/100ML
75 INJECTION, SOLUTION INTRAVENOUS CONTINUOUS
Status: DISCONTINUED | OUTPATIENT
Start: 2024-03-20 | End: 2024-03-20

## 2024-03-20 RX ADMIN — CHLORHEXIDINE GLUCONATE 0.12% ORAL RINSE 15 ML: 1.2 LIQUID ORAL at 09:38

## 2024-03-20 RX ADMIN — LAMOTRIGINE 150 MG: 100 TABLET ORAL at 09:38

## 2024-03-20 RX ADMIN — LAMOTRIGINE 150 MG: 100 TABLET ORAL at 17:54

## 2024-03-20 RX ADMIN — SODIUM CHLORIDE 1.5 UNITS/HR: 9 INJECTION, SOLUTION INTRAVENOUS at 17:50

## 2024-03-20 RX ADMIN — Medication 2 SPRAY: at 23:00

## 2024-03-20 RX ADMIN — VENLAFAXINE 12.5 MG: 25 TABLET ORAL at 10:36

## 2024-03-20 RX ADMIN — METHYLPREDNISOLONE SODIUM SUCCINATE 30 MG: 40 INJECTION, POWDER, FOR SOLUTION INTRAMUSCULAR; INTRAVENOUS at 00:28

## 2024-03-20 RX ADMIN — NYSTATIN: 100000 POWDER TOPICAL at 09:40

## 2024-03-20 RX ADMIN — METHYLPREDNISOLONE SODIUM SUCCINATE 30 MG: 40 INJECTION, POWDER, FOR SOLUTION INTRAMUSCULAR; INTRAVENOUS at 13:20

## 2024-03-20 RX ADMIN — SODIUM CHLORIDE, SODIUM GLUCONATE, SODIUM ACETATE, POTASSIUM CHLORIDE, MAGNESIUM CHLORIDE, SODIUM PHOSPHATE, DIBASIC, AND POTASSIUM PHOSPHATE 75 ML/HR: .53; .5; .37; .037; .03; .012; .00082 INJECTION, SOLUTION INTRAVENOUS at 14:14

## 2024-03-20 RX ADMIN — NYSTATIN: 100000 POWDER TOPICAL at 17:53

## 2024-03-20 RX ADMIN — PANTOPRAZOLE SODIUM 40 MG: 40 INJECTION, POWDER, FOR SOLUTION INTRAVENOUS at 09:38

## 2024-03-20 RX ADMIN — SODIUM CHLORIDE SOLN NEBU 3% 4 ML: 3 NEBU SOLN at 20:44

## 2024-03-20 RX ADMIN — MINOCYCLINE HYDROCHLORIDE 200 MG: 50 TABLET, FILM COATED ORAL at 22:59

## 2024-03-20 RX ADMIN — Medication 2 SPRAY: at 17:54

## 2024-03-20 RX ADMIN — IPRATROPIUM BROMIDE 0.5 MG: 0.5 SOLUTION RESPIRATORY (INHALATION) at 12:56

## 2024-03-20 RX ADMIN — LEVALBUTEROL HYDROCHLORIDE 1.25 MG: 1.25 SOLUTION RESPIRATORY (INHALATION) at 12:57

## 2024-03-20 RX ADMIN — FENTANYL CITRATE 50 MCG: 50 INJECTION INTRAMUSCULAR; INTRAVENOUS at 13:31

## 2024-03-20 RX ADMIN — PANTOPRAZOLE SODIUM 40 MG: 40 INJECTION, POWDER, FOR SOLUTION INTRAVENOUS at 22:00

## 2024-03-20 RX ADMIN — IPRATROPIUM BROMIDE 0.5 MG: 0.5 SOLUTION RESPIRATORY (INHALATION) at 20:43

## 2024-03-20 RX ADMIN — Medication 2 SPRAY: at 02:12

## 2024-03-20 RX ADMIN — SODIUM CHLORIDE SOLN NEBU 3% 4 ML: 3 NEBU SOLN at 07:25

## 2024-03-20 RX ADMIN — Medication 2 SPRAY: at 05:11

## 2024-03-20 RX ADMIN — Medication 2 SPRAY: at 09:41

## 2024-03-20 RX ADMIN — LEVALBUTEROL HYDROCHLORIDE 1.25 MG: 1.25 SOLUTION RESPIRATORY (INHALATION) at 07:25

## 2024-03-20 RX ADMIN — TOPIRAMATE 50 MG: 100 TABLET, FILM COATED ORAL at 17:54

## 2024-03-20 RX ADMIN — IPRATROPIUM BROMIDE 0.5 MG: 0.5 SOLUTION RESPIRATORY (INHALATION) at 07:25

## 2024-03-20 RX ADMIN — SODIUM CHLORIDE, SODIUM GLUCONATE, SODIUM ACETATE, POTASSIUM CHLORIDE, MAGNESIUM CHLORIDE, SODIUM PHOSPHATE, DIBASIC, AND POTASSIUM PHOSPHATE 1000 ML: .53; .5; .37; .037; .03; .012; .00082 INJECTION, SOLUTION INTRAVENOUS at 14:14

## 2024-03-20 RX ADMIN — SODIUM BICARBONATE 75 ML/HR: 84 INJECTION, SOLUTION INTRAVENOUS at 17:51

## 2024-03-20 RX ADMIN — METHYLPREDNISOLONE SODIUM SUCCINATE 30 MG: 40 INJECTION, POWDER, FOR SOLUTION INTRAMUSCULAR; INTRAVENOUS at 22:00

## 2024-03-20 RX ADMIN — TOPIRAMATE 50 MG: 100 TABLET, FILM COATED ORAL at 10:36

## 2024-03-20 RX ADMIN — VENLAFAXINE 12.5 MG: 25 TABLET ORAL at 13:00

## 2024-03-20 RX ADMIN — SODIUM CHLORIDE SOLN NEBU 3% 4 ML: 3 NEBU SOLN at 12:54

## 2024-03-20 RX ADMIN — MINOCYCLINE HYDROCHLORIDE 200 MG: 100 CAPSULE ORAL at 09:41

## 2024-03-20 RX ADMIN — VENLAFAXINE 12.5 MG: 25 TABLET ORAL at 17:55

## 2024-03-20 RX ADMIN — LEVALBUTEROL HYDROCHLORIDE 1.25 MG: 1.25 SOLUTION RESPIRATORY (INHALATION) at 20:44

## 2024-03-20 RX ADMIN — Medication 2 SPRAY: at 13:21

## 2024-03-20 RX ADMIN — METHYLPREDNISOLONE SODIUM SUCCINATE 30 MG: 40 INJECTION, POWDER, FOR SOLUTION INTRAMUSCULAR; INTRAVENOUS at 05:11

## 2024-03-20 RX ADMIN — CHLORHEXIDINE GLUCONATE 0.12% ORAL RINSE 15 ML: 1.2 LIQUID ORAL at 22:00

## 2024-03-20 NOTE — PROGRESS NOTES
Spiritual Care Progress Note    3/20/2024  Patient: Carla Hunt : 1965  Admission Date & Time: 1/10/2024 1941  MRN: [id number] CSN: [id number]         responded to Code Crimson via TT. Upon arrival staff was working with pt and  was waiting outside. Provided supportive presence and cultivated relationship of care. Pt has two children; a 15 year old son and a 26 year old daughter. Stayed with pt's  as he spoke with medical team and made calls to other family members. No further needs were expressed at this time;  was educated on how to alert an RN for Spiritual Care should anything come up in the mean time.    Chaplains still remain available.       24 1000   Clinical Encounter Type   Visited With Family;Health care provider   Crisis Visit Critical Care;Code  (Code Crimson)                  Chaplaincy Interventions Utilized:   Empowerment: Clarified, confirmed, or reviewed information from treatment team , Encouraged focus on present, Encouraged self-care, Provided anxiety containment, and Provided chaplaincy education    Exploration: Explored emotional needs & resources and Explored relational needs & resources    Collaboration: Consulted with interdisciplinary team    Relationship Building: Cultivated a relationship of care and support, Listened empathically, Hospitality, and Provided silent and supportive presence    Chaplaincy Outcomes Achieved:  Expressed gratitude, Identified meaningful connections, Identified priorities, Progressed toward balance of responsibility & trust , Progressed toward focus on present, and Verbally processed emotions    Spiritual Coping Strategies Utilized:   No spiritual coping

## 2024-03-20 NOTE — PROGRESS NOTES
NEPHROLOGY PROGRESS NOTE   Carla Hunt 59 y.o. female MRN: [id number]  Unit/Bed#: [facility] 12 Encounter: [id number]  Reason for Consult: Acute kidney injury    Assessment/Plan:  Acute kidney injury, etiology multifactorial.  Likely component due to ATN given hemodynamic fluctuations, worsening anemia, episodic ELIESER, etc.  Additional component could be due to Bactrim use with impaired creatinine secretion.  Creatinine slightly improved at 1.63,  remains nonoliguric.  Urine chloride 35, urine sodium 40  Previous urine dipstick negative with 4-10 white cells, 1-2 red cells.  Metabolic acidosis with recent VBG showing a pH of 7.32, pCO2 27, HCO3 of 14.  Consider bicarbonate infusion if no improvement  Acute on chronic anemia status post PRBC transfusion, serum and urine urine electrophoresis negative for monoclonal gammopathy  Stomal site bleeding status post bedside procedure with control of bleeding  Hypoxic respiratory failure currently remains vent dependent, status post tracheostomy etiology multifactorial given COVID-19 pneumonia with superimposed bacterial component  Recent volvulus status post hemicolectomy with cecal ostomy with noted stomal retraction  B-cell lymphoma, previously on rituximab therapy.  Encephalopathy with recent subarachnoid hemorrhage and history of seizure disorder    Overall renal function appears to be slowly improving  Patient remains nonoliguric  Monitor renal function as well as urine output closely and lieu of recent significant stomal bleeding.  Discussed with critical care, no changes from nephrology standpoint    SUBJECTIVE:  Seen and examined.  Events noted.  Noted significant stomal bleeding controlled with suture placement.  Awakens estimated.   at bedside.    Review of Systems    OBJECTIVE:  Current Weight: Weight - Scale: 75.8 kg (167 lb 1.7 oz)  Vitals:    03/20/24 0900 03/20/24 1000 03/20/24 1028 03/20/24 1100   BP:   104/52    BP Location:       Pulse: (!) 120 (!)  124 (!) 133 (!) 138   Resp: 16 18 (!) 24 (!) 23   Temp:   (!) 96.6 °F (35.9 °C)    TempSrc:       SpO2: 98% 97%  94%   Weight:       Height:           Intake/Output Summary (Last 24 hours) at 3/20/2024 1149  Last data filed at 3/20/2024 1001  Gross per 24 hour   Intake 1851.59 ml   Output 3355 ml   Net -1503.41 ml       Physical Exam  Constitutional:       Appearance: She is ill-appearing.   Cardiovascular:      Rate and Rhythm: Normal rate and regular rhythm.   Pulmonary:      Comments: Coarse breath sounds bilaterally  Abdominal:      General: There is no distension.      Palpations: Abdomen is soft.      Tenderness: There is no abdominal tenderness.   Musculoskeletal:      Right lower leg: Edema present.      Left lower leg: Edema present.   Skin:     General: Skin is warm and dry.      Findings: No rash.   Neurological:      Mental Status: She is alert. Mental status is at baseline.         Medications:    Current Facility-Administered Medications:     acetaminophen (TYLENOL) tablet 650 mg, 650 mg, Oral, Q6H PRN, Ryanne Lerma MD    chlorhexidine (PERIDEX) 0.12 % oral rinse 15 mL, 15 mL, Mouth/Throat, Q12H SARTHAK, Miguel Whittaker MD, 15 mL at 03/20/24 0938    fentaNYL injection 50 mcg, 50 mcg, Intravenous, Q1H PRN, Joe Lazcano DO, 50 mcg at 03/16/24 1108    insulin regular (HumuLIN R,NovoLIN R) 1 Units/mL in sodium chloride 0.9 % 100 mL infusion, 0.3-21 Units/hr, Intravenous, Titrated, Shahriar Helm DO, Last Rate: 3 mL/hr at 03/20/24 1023, 3 Units/hr at 03/20/24 1023    ipratropium (ATROVENT) 0.02 % inhalation solution 0.5 mg, 0.5 mg, Nebulization, TID, Pearl Eubanks MD, 0.5 mg at 03/20/24 0725    lamoTRIgine (LaMICtal) tablet 150 mg, 150 mg, Oral, BID, Jad Mena MD, 150 mg at 03/20/24 0938    levalbuterol (XOPENEX) inhalation solution 1.25 mg, 1.25 mg, Nebulization, TID, Pearl Eubanks MD, 1.25 mg at 03/20/24 2230    lidocaine (PF) (XYLOCAINE-MPF) 2 % injection 10 mL, 10 mL,  Inhalation, Once, Miguel Interiano MD    [COMPLETED] methylPREDNISolone sodium succinate (Solu-MEDROL) injection 50 mg, 50 mg, Intravenous, Q6H SARTHAK, 50 mg at 03/16/24 1830 **FOLLOWED BY** [COMPLETED] methylPREDNISolone sodium succinate (Solu-MEDROL) injection 40 mg, 40 mg, Intravenous, Q6H SARTHAK, 40 mg at 03/19/24 1756 **FOLLOWED BY** methylPREDNISolone sodium succinate (Solu-MEDROL) injection 30 mg, 30 mg, Intravenous, Q8H SARTHAK **FOLLOWED BY** [DISCONTINUED] methylPREDNISolone sodium succinate (Solu-MEDROL) injection 20 mg, 20 mg, Intravenous, Q6H SARTHAK **FOLLOWED BY** [DISCONTINUED] methylPREDNISolone sodium succinate (Solu-MEDROL) injection 10 mg, 10 mg, Intravenous, Q6H SARTHAK **FOLLOWED BY** [DISCONTINUED] methylPREDNISolone sodium succinate (Solu-MEDROL) injection 10 mg, 10 mg, Intravenous, Q8H SARTHAK **FOLLOWED BY** [DISCONTINUED] methylPREDNISolone sodium succinate (Solu-MEDROL) injection 10 mg, 10 mg, Intravenous, Q12H SARTHAK **FOLLOWED BY** [DISCONTINUED] methylPREDNISolone sodium succinate (Solu-MEDROL) injection 10 mg, 10 mg, Intravenous, Daily **FOLLOWED BY** [START ON 3/22/2024] methylPREDNISolone sodium succinate (Solu-MEDROL) injection 30 mg, 30 mg, Intravenous, Q12H SARTHAK **FOLLOWED BY** [START ON 3/24/2024] methylPREDNISolone sodium succinate (Solu-MEDROL) injection 20 mg, 20 mg, Intravenous, Q12H SARTHAK **FOLLOWED BY** [START ON 3/26/2024] methylPREDNISolone sodium succinate (Solu-MEDROL) injection 10 mg, 10 mg, Intravenous, Q12H SARTHAK **FOLLOWED BY** [START ON 3/28/2024] methylPREDNISolone sodium succinate (Solu-MEDROL) injection 10 mg, 10 mg, Intravenous, Daily, Joe Lazcano DO    minocycline (MINOCIN) capsule 200 mg, 200 mg, Oral, BID, Betzaida Sr MD, 200 mg at 03/20/24 0941    modafinil (PROVIGIL) tablet 100 mg, 100 mg, Per NG Tube, Daily, Hannah Ramirez PA-C, 100 mg at 03/19/24 0856    multi-electrolyte (ISOLYTE-S PH 7.4) bolus 1,000 mL, 1,000 mL, Intravenous, Once, Joe Lazcano DO    nystatin (MYCOSTATIN)  powder, , Topical, BID, Miguel Whittaker MD, Given at 03/20/24 0940    ondansetron (ZOFRAN) injection 4 mg, 4 mg, Intravenous, Q6H PRN, Nereyda Mar DO    pantoprazole (PROTONIX) injection 40 mg, 40 mg, Intravenous, Q12H SARTHAK, Joe Lazcano DO, 40 mg at 03/20/24 0938    polyethylene glycol (MIRALAX) packet 17 g, 17 g, Per NG Tube, Daily, Jad Mena MD, 17 g at 03/19/24 0909    sodium chloride (AYR SALINE NASAL) nasal gel 1 Application, 1 Application, Nasal, Q1H PRN, Miguel Whittaker MD    sodium chloride (OCEAN) 0.65 % nasal spray 2 spray, 2 spray, Each Nare, Q4H, Miguel Whittaker MD, 2 spray at 03/20/24 0941    sodium chloride 3 % inhalation solution 4 mL, 4 mL, Nebulization, TID, Miguel Interiano MD, 4 mL at 03/20/24 0725    topiramate (TOPAMAX) 6 mg/ml oral suspension 50 mg, 50 mg, Per PEG Tube, BID, Sindie Soyeon Kim, MD, 50 mg at 03/20/24 1036    venlafaxine (EFFEXOR) tablet 12.5 mg, 12.5 mg, Oral, TID With Meals, Amdandre Lazcano DO, 12.5 mg at 03/20/24 1036    Laboratory Results:  Results from last 7 days   Lab Units 03/20/24  1048 03/20/24  0831 03/20/24  0827 03/20/24  0502 03/20/24  0457 03/19/24  0543 03/18/24  2252 03/18/24  1620 03/18/24  0533 03/18/24  0429 03/17/24  0800 03/17/24  0451 03/16/24  0611 03/15/24  0603 03/14/24  0542   WBC Thousand/uL 7.09  --   --   --  7.88 9.92  --   --   --  8.39  8.39  --  11.78* 9.47 7.59 10.46*   HEMOGLOBIN g/dL 11.6  --  8.5*  --  9.0* 9.5* 9.3* 9.9*   < > 5.6*  5.6*   < > 6.8* 7.4* 7.1* 7.4*   I STAT HEMOGLOBIN g/dl  --  6.8*  --   --   --   --   --   --   --   --   --   --   --   --   --    HEMATOCRIT % 34.5*  --  26.7*  --  27.6* 28.1* 27.0* 29.4*   < > 17.6*  17.6*  --  21.5* 22.2* 21.2* 22.5*   HEMATOCRIT, ISTAT %  --  20*  --   --   --   --   --   --   --   --   --   --   --   --   --    PLATELETS Thousands/uL 72*  --   --   --  85* 86*  --   --   --  89*  89*  --  124* 139* 118* 191   POTASSIUM mmol/L  --   --   --  4.9  --  4.7  --   --   --  4.1  --  4.3 3.9  3.3* 3.2*   CHLORIDE mmol/L  --   --   --  108  --  107  --   --   --  107  --  105 107 109* 112*   CO2 mmol/L  --   --   --  18*  --  17*  --   --   --  18*  --  19* 18* 18* 19*   CO2, I-STAT mmol/L  --  15*  --   --   --   --   --   --   --   --   --   --   --   --   --    BUN mg/dL  --   --   --  88*  --  85*  --   --   --  81*  --  71* 65* 61* 59*   CREATININE mg/dL  --   --   --  1.63*  --  1.79*  --   --   --  1.89*  --  1.65* 1.65* 1.59* 1.55*   CALCIUM mg/dL  --   --   --  8.7  --  8.4  --   --   --  8.0*  --  8.2* 8.2* 7.6* 7.7*   MAGNESIUM mg/dL  --   --   --   --  2.4 2.3  --   --   --  2.3  --  2.2 2.1 1.9 2.0   PHOSPHORUS mg/dL  --   --   --   --  5.2* 4.0  --   --   --  4.0  --  3.6 3.6 3.8 4.0  4.0   GLUCOSE, ISTAT mg/dl  --  118  --   --   --   --   --   --   --   --   --   --   --   --   --     < > = values in this interval not displayed.

## 2024-03-20 NOTE — PROGRESS NOTES
Progress Note - Infectious Disease   Carla Hunt 59 y.o. female MRN: [id number]  [facility]: [id number]      Impression/Plan:    1. Acute hypoxic respiratory failure, likely multifactorial, with both COVID and bacterial pneumonia contributing.  Also consider persistent inflammation, fibrosis as seems quite steroid responsive in past.  Most recently extubated 3/1.  Patient with worsening respiratory status requiring intubation again 3/11.  Suspect ongoing hypoxia related to persistent COVID-pneumonia, superimposed bacterial infection, inflammatory component/lung fibrosis related to COVID, now with profound deconditioning and muscle weakness.  Status post bronchoscopy and trach 3/12 with excessive secretions seen from the lower lobes bilaterally.  BAL culture from 3/11 shows heavy growth of Stenotrophomonas maltophilia.  PJP DFA negative. While the patient has grown this before and she certainly may be colonized, given worsening CT findings, intubation and prolonged steroids would treat as a true pathogen.  Status post 10 days of vancomycin and cefepime.  Started on Bactrim 3/13, switched to minocycline 3/18.  Patient completed 14-day course of remdesivir/Paxlovid 3/15.  CRP improving, patient is on PS but continues to have profound weakness and poor mental status.  Status post repeat bronchoscopy 3/17 for airway clearance with continued thick secretions.  Low concern for uncontrolled infection contributing to clinical picture.  -Continue p.o. minocycline 200 mg twice daily via NG tube.  Tube feeds should be held for 1 hour prior to and 1 hour after minocycline administration. Recommend a 14 day course  -Steroid dosing per critical care, tolerating weaning with improving CRP  -No indication to treat Candida in respiratory culture, this is respiratory colonization  -Trend CRP  -If clinically deteriorates with concern for progressive sepsis would add meropenem 1g IV Q8 to the Bactrim  -follow up  CMV PCR     2. SIRS versus sepsis.  Fluctuating fever, WBC.  Fevers may be multifactorial due to COVID (still positive antigen and PCR) versus inflammation (seem to correlate to steroid dosing).  Also consider developing bacterial infection.  Recent blood cultures 2/26 negative.  CT C/A/P 3/10 shows stable groundglass and nodular opacities, inflammation around stoma. Now with dehiscence of her abdominal wound following staple removal, status post wound VAC placement 3/13. Patient afebrile, without leukocytosis  -antibiotics as above  -Follow temperatures closely  -Recheck WBC in AM to monitor infection  -Supportive care as per the primary service     3. Recurrent bacterial pneumonia.  The patient has completed multiple treatment courses over the hospitalization.  Most recent BAL culture with Pseudomonas and stenotrophomonas.  Unclear if pathogens or colonizers.  Status post 10 days levofloxacin.  CT C/A/P showed evolving changes likely all due to sequelae of COVID, infections.  Noted to have worsening hypoxia and purulent secretions on bronchoscopy 3/11.  BAL culture with heavy growth of Stenotrophomonas maltophilia, Candida              -Antibiotics as above              -Wean O2 as able     4. Severe COVID, present on admission.  Patient was treated with a 10-day course of remdesivir, dexamethasone and was given 1 dose of Tocilizumab on admission.  COVID antigen was negative prior to coming off isolation.  Had positive COVID PCR from BAL 2/16, status post another 5-day course of remdesivir.  Patient has remained on high-dose systemic corticosteroid throughout her hospitalization which were recently intensified 2/26 for fevers.  Patient having persistent fevers, hypoxia.  COVID antigen positive and PCR is also positive 3/3 and Ct 26.8, consistent with high viral replication.  Repeat PCR positive with Ct now closer to 30. CRP overall decreasing with slow steroid wean.  Status post 14-day course of  remdesivir/Paxlovid 3/15/2024  -Steroid wean per ICU  -No additional antivirals indicated  -Will repeat rapid COVID antigen 3/22 to see if we can remove isolation     5. 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  Patient is status post exploratory laparotomy with ileocecectomy and end ileostomy creation 2/20.   End ileostomy is with ongoing ischemic changes which is likely contributing to the imaging findings and ongoing SIRS response.  Now with wound dehiscence status post staple removal, status post wound VAC placement 3/13, removed but replaced today due to bleeding  -Serial exams  -Surgery follow-up   -no current signs of infection, need for antibiotics      B-cell lymphoma, on maintenance rituxan, which is currently postponed.  Rituximab is a risk factor for persistent COVID infection.    7.  ELIESER.  More likely due to hypovolemia since creatinine was increasing prior to starting Bactrim.   -Monitor creatinine closely   -Dose adjust antibiotics as needed    8.  Anemia, thrombocytopenia, lymphopenia.  Patient has had persistent lymphopenia throughout this admission, likely due to rituximab, viral infection, high-dose steroids.  Now with worsening anemia and thrombocytopenia.  Bactrim discontinued 3/18   -Steroid wean per primary   -Transfusion support per primary   -Consider hematology consult   -Follow-up CMV PCR    Above management plan to continue p.o. minocycline discussed with the critical care attending Dr. Vazquez who is in agreement.  Discussed with the  at bedside. ID will follow.    Antibiotics:  Day 3 minocycline (day 8 total)    Subjective:  The patient remains on PS on the ventilator. This morning, code crimson was called due to stomal site bleeding. Bleeding controlled with stitches. Now she is hemodynamically stable, off pressors. She awakens and nods.    Objective:  Vitals:  Temp:  [96.1 °F (35.6 °C)-98.5 °F (36.9 °C)] 96.6 °F (35.9 °C)  HR:  [105-138] 138  Resp:  [15-33] 27  BP:  ()/(50-73) 104/52  SpO2:  [91 %-100 %] 91 %  Temp (24hrs), Av °F (36.1 °C), Min:96.1 °F (35.6 °C), Max:98.5 °F (36.9 °C)  Current: Temperature: (!) 96.6 °F (35.9 °C)    Physical Exam:   General Appearance:  Ill-appearing, lethargic but now awakens to voice   Neck: Trach in place. Bloody secretions around the trach   Lungs:   Rhonchi bilaterally   Heart:  RRR; no murmur, rub or gallop   Abdomen:   Midline wound dehiscence, guaze in place without active bleeding   Extremities: No edema   Skin: No new rashes or lesions.        Labs:   All pertinent labs and imaging studies were personally reviewed  Results from last 7 days   Lab Units 24  1048 24  0831 24  0827 24  0457 24  0543   WBC Thousand/uL 7.09  --   --  7.88 9.92   HEMOGLOBIN g/dL 11.6  --  8.5* 9.0* 9.5*   I STAT HEMOGLOBIN g/dl  --  6.8*  --   --   --    PLATELETS Thousands/uL 72*  --   --  85* 86*     Results from last 7 days   Lab Units 24  0831 24  0502 24  0543 24  0429   SODIUM mmol/L  --  137 138 135   POTASSIUM mmol/L  --  4.9 4.7 4.1   CHLORIDE mmol/L  --  108 107 107   CO2 mmol/L  --  18* 17* 18*   CO2, I-STAT mmol/L 15*  --   --   --    BUN mg/dL  --  88* 85* 81*   CREATININE mg/dL  --  1.63* 1.79* 1.89*   EGFR ml/min/1.73sq m  --  34 30 28   GLUCOSE, ISTAT mg/dl 118  --   --   --    CALCIUM mg/dL  --  8.7 8.4 8.0*   AST U/L  --   --  29  --    ALT U/L  --   --  64*  --    ALK PHOS U/L  --   --  141*  --            Results from last 7 days   Lab Units 24  0543 24  0429 24  0451 24  0611 03/15/24  0603 24  0542   CRP mg/L 41.7* 38.2* 78.9* 13.4* 18.6* 32.3*           Results from last 7 days   Lab Units 24  0457   D-DIMER QUANTITATIVE ug/ml FEU 1.92*           Imaging:  CT chest, abdomen, pelvis personally reviewed and shows persistent bilateral groundglass nodular consolidation

## 2024-03-20 NOTE — PROCEDURES
Arterial Line Insertion    Date/Time: 1/10/2024 7:41 PM    Performed by: Nigel Escobar DO  Authorized by: Nigel Escobar DO    Patient location:  ICU  Other Assisting Provider: No    Consent:     Consent obtained:  Emergent situation  Indications:     Indications: hemodynamic monitoring, multiple ABGs, continuous blood pressure monitoring and frequent labs / infusion    Pre-procedure details:     Skin preparation:  Chlorhexidine  Sedation:     Sedation type:  Anxiolysis  Procedure details:     Location / Tip of Catheter:  Radial    Laterality:  Left    Placement technique:  Ultrasound guided    Ultrasound image availability:  Images available in PACS    Number of attempts:  2    Successful placement: yes      Transducer: waveform confirmed    Post-procedure details:     Post-procedure:  Sutured and sterile dressing applied    Patient tolerance of procedure:  Tolerated well, no immediate complications

## 2024-03-20 NOTE — PROCEDURES
Surgery  Carla Hunt 59 y.o. female MRN: [id number]  Unit/Bed#: [facility] 12 Encounter: [id number]     Wound Check Progress Note     Location of wound:   abdomen  See images below     Prior dressing was removed.  Pictures taken and placed into patient's chart, as below.    Midline wound cleaned and flushed with saline. Skin cleansed. Fascia intact. Able to intubate ileostomy and mucus fistula, both above the fascia. No signs of bleeding or infection. Area of subcutaneous breakdown between ostomy and midline wound. Single piece of white foam placed into the area as a barrier. Single piece of black foam placed into midline and additional piece of black foam offset as a bridge. Vac set to 100mmHg suction. New ostomy appliance placed.    Total sponges: 1 white, 2 black     Patient tolerated procedure well.    Images:            Nancy Pond MD  3/20/2024

## 2024-03-20 NOTE — RESPIRATORY THERAPY NOTE
RT Ventilator Management Note  Carla Hunt 59 y.o. female MRN: [id number]  Unit/Bed#: [facility] 12 Encounter: [id number]      Daily Screen         3/19/2024  0735 3/20/2024  0731          Patient safety screen outcome:: Passed Passed      Not Ready for Weaning due to:: -- --                Physical Exam:   Assessment Type: Assess only  General Appearance: Sleeping, Sedated  Respiratory Pattern: Spontaneous, Assisted  Chest Assessment: Chest expansion symmetrical  Bilateral Breath Sounds: Diminished, Coarse  Cough: None  Suction: Trach  O2 Device: Ventilator      Resp Comments: (P) pt PS weaned by md.  pt tolerating settings at this time. vent disinfected and pt suctioned at this time.

## 2024-03-20 NOTE — ASSESSMENT & PLAN NOTE
Code Status: full - Level 1  Ongoing medical optimization without limits at this time. Hopeful for slow recovery. Min does express understanding of periodic setbacks but is hoping Carla can build some momentum to become medically stable for d/c to rehab.

## 2024-03-20 NOTE — RESPIRATORY THERAPY NOTE
RT Ventilator Management Note  Carla Hunt 59 y.o. female MRN: [id number]  Unit/Bed#: [facility] 12 Encounter: [id number]      Daily Screen         3/18/2024  1900 3/19/2024  0735          Patient safety screen outcome:: Passed Passed                Physical Exam:   Assessment Type: Assess only  General Appearance: Sleeping, Sedated  Respiratory Pattern: Spontaneous, Assisted  Chest Assessment: Chest expansion symmetrical  Bilateral Breath Sounds: Diminished, Coarse  Cough: None  Suction: Trach  O2 Device: Ventilator      Resp Comments: Pt. remains on CPAP/PS mode.  No changes throughout the night.  Will continue to monitor pt per protcol.

## 2024-03-20 NOTE — PROCEDURES
POC Cardiac US    Date/Time: 3/20/2024 6:46 AM    Performed by: Joe Lazcano DO  Authorized by: Joe Lazcano DO    Patient location:  ICU  Other Assisting Provider: Yes (comment) (Nigel Escobar)    Procedure details:     Exam Type:  Diagnostic    Indications: suspected volume depletion      Assessment / Evaluation for: inferior vena cava for fluid responsiveness and intravascular volume status      Exam Type: follow-up exam      Image quality: diagnostic      Image availability:  Not saved  Patient Details:     Cardiac Rhythm:  Regular    Mechanical ventilation: Yes    Cardiac findings:     Echo technique: limited 2D      Views obtained: parasternal long axis and parasternal short axis      Pericardial effusion: trace      Tamponade physiology: absent      Wall motion: normal      LV systolic function: hyperdynamic      RV dilation: none    Pulmonary findings:     B-lines: 1 to 3    IVC findings:     IVC Size: small      IVC Inspiratory Collapse: partial      Maximum IVC Diameter (cm):  7    Minimum IVC Diameter (cm):  8    IVC Variability (%):  -14  Interpretation:     Fluid Status:  Hypovolemic        ----------------------------------------------------  Joe Lazcano DO, PGY-2  Internal Medicine Residency   [facility] [place], [place]

## 2024-03-20 NOTE — PROGRESS NOTES
Spiritual Care Progress Note    3/20/2024  Patient: Carla Hunt : 1965  Admission Date & Time: 1/10/2024 1941  MRN: [id number] CSN: [id number]       attempted to follow-up with pt's  after code this AM. Pt working with staff and no family present at this time.    Chaplains still remain available.       24 1400   Clinical Encounter Type   Visited With Patient not available

## 2024-03-20 NOTE — ASSESSMENT & PLAN NOTE
Patient was re-intubated 3/11 with subsequent bedside trach on 3/12.   Very careful steroid wean  per critical care team.   On trach collar during time of encounter.

## 2024-03-20 NOTE — WOUND OSTOMY CARE
Progress Note - Wound   Carla Hunt 59 y.o. female MRN: [id number]  [facility] Encounter: [id number]        Assessment:   Patient seen today for wound care follow up assessment. Patient is . Patient is dependent for all care, on vent and trach in MICU, on critical care low air loss mattress. Patient is receiving parenteral nutrition. Patient is turned and repositioned with green foam wedges. Patient was code crimson previously today.      HA stage III-  100% pink tissue partial thickness, no drainage.  Continuing with foam dressing.    B/L heels intact and blanching, preventative skin care orders placed.      No induration, fluctuance, odor, warmth/temperature differences, redness, or purulence noted to the above noted wounds and skin areas assessed. New dressings applied per orders listed below. Patient tolerated well- no s/s of non-verbal pain or discomfort observed during the encounter. Bedside nurse aware of plan of care. See flow sheets for more detailed assessment findings. Wound care will continue to follow.         Plan:   Cleanse b/l sacro-buttocks with soap and water, pat dry, and apply bordered silicone foam dressing to cover the wounds. Change every other day and as needed for soilage/dislodgement.      Cleanse b/l groin with soap and water, pat dry, and dust the skin lightly with nystatin powder per order. May use alginate rope to skin folds if skin is open or macerated. Change BID and PRN.     Ostomy teaching when more medically stable.    Wound 02/12/24 Pressure Injury Buttocks (Active)   Wound Image   03/20/24 1311   Wound Description Fragile;Pink 03/20/24 1311   Pressure Injury Stage 3 03/20/24 1311   Sofie-wound Assessment Fragile 03/20/24 1311   Wound Length (cm) 3 cm 03/20/24 1311   Wound Width (cm) 1 cm 03/20/24 1311   Wound Depth (cm) 0.1 cm 03/20/24 1311   Wound Surface Area (cm^2) 3 cm^2 03/20/24 1311   Wound Volume (cm^3) 0.3 cm^3 03/20/24 1311   Calculated Wound Volume (cm^3)  0.3 cm^3 03/20/24 1311   Change in Wound Size % -100 03/20/24 1311   Wound Site Closure WILL 03/20/24 1311   Drainage Amount Scant 03/20/24 1311   Drainage Description Sanguineous 03/20/24 1311   Non-staged Wound Description Partial thickness 03/20/24 1311   Treatments Cleansed 03/20/24 1311   Dressing Foam, Silicon (eg. Allevyn, etc) 03/20/24 1311   Wound packed? No 03/20/24 1311   Packing- # removed 0 03/20/24 1311   Packing- # inserted 0 03/20/24 1311   Dressing Changed New 03/20/24 1311   Patient Tolerance Tolerated well 03/20/24 1311   Dressing Status Clean;Dry;Intact 03/20/24 1311   Wound 03/17/24 Pressure Injury Thigh Posterior;Right;Medial (Active)   Wound Image   03/20/24 1313   Wound Description Pink;Dry; intact 03/19/24 1504   Pressure Injury Stage  03/18/24 0400   Sofie-wound Assessment Clean;Dry;Intact 03/19/24 1504   Drainage Amount None 03/20/24 1105   Treatments Cleansed;Site care 03/19/24 1504   Dressing Foam, Silicon (eg. Allevyn, etc) 03/19/24 1504   Dressing Changed New 03/19/24 1504   Patient Tolerance Tolerated well 03/19/24 1504   Dressing Status Clean;Dry;Intact 03/19/24 1504           Molly Bustos BSN, RN, CWOCN

## 2024-03-20 NOTE — SOCIAL WORK
The Palliative SW met with the pt  at bedside to provide emotional support.  He spoke about the loss of their son 5 years ago and not wanting to lose his wife as well.  He spoke about their grandson and reaching out to the school.  He reports the principal and guidance are supporting his grandson in the school setting.      Overall he views his wife as a very strong women who has experienced a lot.  He really wants to make the best decisions regarding her care without causing her any suffering.   His daughter will be visiting later today.    He was very sad and concerned about this change in her health.  The pt gets well and then she has a setback.  He remains hopeful and was informed he has supports in the hospital.  He states  services continue to be involved.    He is awaiting the surgeons to discuss next steps. There will also be a discussion regarding the pt titration off the steroids.    While visiting the pt  had her respond to his questions by shaking her head.  She was able to open her eyes as well.      I have spent 30 minutes with Patient and family today in which greater than 50% of this time was spent in counseling/coordination of care     Palliative  will follow-up as requested by patient, family, and primary team.  Please contact with any specific requests

## 2024-03-20 NOTE — QUICK NOTE
Surgical team responded to Code Crimson called o/a stomal site bleeding.  Stoma appliance removed and a small pulsatile bleeding was noted from the 3 o'clock aspect of the skin/subcut edge.  Hemostasis was achieved with two figure-of-8 stitches using # 2-0 silk sutures.  Patient's hemodynamic status markedly improved and stabilized after hemostasis was achieved.      PLAN:  - continue serial monitoring of stoma site.  - rest of care per MICU team.    Kaiser Vincent MD  PGY-5, Surgery.

## 2024-03-20 NOTE — PROGRESS NOTES
[facility]  Progress Note: Critical Care  Name: Carla Hunt 59 y.o. female I MRN: [id number]  Unit/Bed#: MICU 12 I Date of Admission: 1/10/2024   Date of Service: 3/20/2024 I Hospital Day: 70    Assessment/Plan     Acute hypoxic respiratory failure; vent dependent; s/p tracheostomy 3/12  Persistent COVID-19 infection; s/p 14d course of antivirals completed 3/15  Metabolic encephalopathy, improving  Bilateral ground glass opacities, persistent  Stenotrophomonas pneumonia; previously on Bactrim, switched to minocycline 2/2 #7  MDR Pseudomonas PNA s/p tx with levaquin   ELIESER, pre-renal, on CKD3, improving  Uremia  Thrombocytopenia  Acute on chronic anemia- multifactorial-?blood loss, chronic inflammation, iatrogenic  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; complicated by #12  Wound dehiscence 2/2 poor wound healing s/p wound vac 3/13  History of B-Cell lymphoma, s/p chemotheraphy 2022, Rituxan maintenance therapy on hold  Minimal SAH POA, no interventions required, resolved on repeat CTHS  Seizure disorder; on home AEDs  Steroid induced hyperglycemia; controlled on insulin gtt  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  -Most recent repeat CTH 3/13 negative for acute intracranial findings.  Has had extensive neurological workup including MRI/CT neuroimaging, LP with unremarkable findings  -Etiology unclear. Has been off sedation. Electrolytes, sodium, BS at goal. Ammonia normal. May be prolonged 2/2 PNA, possibly worsened by uremic encephalopathy worsened by ELIESER, ?UGIB, however GFR  not <15; candida growth seen on BAL Cx from 3/11, may be respiraotry colonization, however fungal infection not ruled in setting of severe lymphopenia, depressed immunoglobulins.      Plan:  -Avoid PRN fentanyl/sedative meds as able.  -Continue modafinil t 100mg qAM to stimulate arousal per palliative     -Avoid prn zyprexa, discontinued  -Continue to wean steroids as planned  -ABX tx with PNA as below  -Will hold off on starting empiric antifungal given improvement in neurological exam however may be consideration if clinical status does not improve.  -Frequent neurochecks   -?Repeat MRI brain to evaluate for eval for encephalitis/inflammatory process, however may not   -Video EEG deferred at thist time due to low suspicion for seizures. Less likely non-convulsive status epilepticus as patient is on home AEDs.     Diagnosis: seizure disorder, known history  -S/p partial left temporal lobe resection in 2007  -On Lamictal 150 bid and Topamax 50mg bid  -Last lamotrigine level from 03/02 at 10.7 (therapeutic)     Diagnosis: Anxiety, known history  -On Effexor 12.5 mg TID     CV:  -Diagnosis: Sinus tachycarida  -In setting of anemia/hypovolemia, bedside US revealing IVC diameter of 6 to 8 cm, collapsible with inspiration; Hgb improved s/p 2U PRBCs on 3/17.  Has received additional fluid boluses with improvement in sinus tachycardia.  -TTE 3/17 with no major structural dysfunction  -May be 2/2 dehydration/hypvol, acute on chronic anemia, underlying infectious process, pain, less likely Modafinal-induced patient received 2 dose thus far.  Plan:  - See plans under Pulm, Heme/Onc, ID        Pulm:  Diagnosis: Acute hypoxic respiratory failure  Diagnosis: Bilateral ground glass opacities, persistent  -Vent dependent; s/p trach 3/12 after was reintubated 3/11 due to increased WOB,elevated CRP  -Persistently COVID+ s/p multiple courses of antivirals (most recent completed 3/15). Complicated by recurrent  bacterial PNA treated w 10d levaquin (completed 2/25) for MDR PNA; most recent BAL +recurrent stenotrophomona treated with Bactrim, swithced to minocyline today 2/2 ELIESER  -Etiology Likely multifactorial including possible COVID pneumonitis vs recurrent PNA vs hypersensitivity pneumonitis 2/2 ?rituxumab. Persistent inflammation likely given patient has been steroid responsive throughout admission. CRP continues downward trend. Lack of clinical improvement despite steroids, ABx, antivirals. Fungal source not ruled out in setting of severe lymphopheia, low immunoglobulins.  Plan:  -Finished 14d course of Paxlovid/remdesivir on 3/15  -Continue minocycline 200mg bid via NGT to tx Stenotrophomonas PNA as below  -Steroid wean- IV Solu-Medrol 30 mg q6h x3d. Plan for slow steroid wean by 10mg every 3d until patient is down to 10mg q6h and then start decreasing frequency till patient is off.  -Follow up BAL viral culture pending  -Follow up Fungitell  -Follow up anti-Rituximab ab  -Wean PS ventilation, currently on 5/6/40%  -Trial SBT once  tachycardia improves     GI:  Diagnosis: Partial cecal volvulus s/p right hemicolectomy with ostomy pouch in place  -Complicated by poor wound healing of midline incision as evidenced by wound dehiscence s/p wound vac that was removed 3/17 by gen surg.  -Stoma with dark brown output, consistent with prior  Plan:  -Wound vac as per general surgery  -Monitor ostomy pouch output  -Surgery following, will keep them updated if any further changes  -Tube feeds running     :  Diagnosis: ELIESER on CKD III  -Baseline Cr  0.8-1.2  -Cr remains elevated but improving today, at 1.63. compared to 1.89, BUN remains elevated in 80s  -UO adequate, on Ortiz, draining clear yellow urine  -Prerenal azotemia 2/2 hypovolemia, anemia-?blood loss, likely exacerbated by bactrim use (increased Cr secretion), however remains off Bactrim x2d with improvement in serum creatinine  Plan:  -S/P IVF, s/p 2U prbc's on 3/19,  Hgb stable  -Bactrim switched to minocycline, will continue  -Trend daily BMP  -Continue to monitor I/O and UOP  -Avoid nephrotoxins as able  -Ensure adequate pre/post IVF prior to potential contrast exposure if CT w contrast planned to evaluate for ?blood loss anemia as below.     Diagnosis: NAGMA  -Cl normal, positive urine gap. No loose stools/diarrhea reported  -Ddx include 2/2 uremic acidosis and ELIESER vs RTA2 from Topamax given positive UG. Unlikely RTA1 given absence of hypokalemia and normal serum Cl  Plan:  -S/P IVF bolus for hydration setting of ELIESER with prerenal azotemia in setting of hypovolemia as evidenced by collapse IVC on bedside US.  -Bactrim switched to minocycline, day 2, with improvement in serum Cr  -Treat underlying uremia, planned EGD next week as per GI to rule out UGIB  -Optimize electrolytes including potassium/monitor hypokalemia  -?  Consider discontinuation of topiramate  -Trend bmp daily     F/E/N:  F: Isolyte bolus 1 L  E: monitor and replete for goal K>4, P>3, Mg>2  N: On tube feeds with vital 1.5; 45 cc/h, Prosource liquid protein to 1 packet BID     Heme/Onc:  Diagnosis: Acute on chronic anemia  -Baseline Hgb ~7-8. S/P 2U PRBCs 3/17 after repeat Hgb 5.3, total of 6U transfused since admission.  -Scant bleeding noted from from midline incision. No significant from ostomy bag.\  -Elevated retic count, tachycarida, interittent MAPS<65, uremia suggestive of blood loss anemia. Slow UGIB not ruled out as patient remains sinus tachy, BUN remains elevated despite blood transfusion/fluid resuscitation.  -?Worsened by impaired marrow response liekly 2/2 persistent inflammation due to low retic index ~1 prior to most recent transfusions; normocytic with normal B12/folate levels; MCV<100. SPEP/UPEP negative. Hemolysis labs pending. Iatrogenic cause likely contributing 2/2 frequent blood draws; chronic anemia likely persistent inflammatory state/CKD/lymphoma in setting of elevated ferritin of  974  Plan:  -Obtain CT c/a/p w IV/PO contrast if ELIESER improves; will consider CT w po contrast only if no improvement in ELIESER  -Follow up hemolysis labs  -Monitor ostomy output  -Continue tube feeds/nutritional support  ·Trend CBC, transfuse to maintain Hgb>7    Diagnosis: Thrombocytopenia  -PLTS stable around 80k  -Noted to have vaginal bleeding, blood oozing from ostomy site and wound vac epistaxis  -DIC panel not suggestive of DIC given elevated fibrinogen, stable hemodynamics and hemoglobin; D-dimer elevated 1.92 however nonspecific.   -Persistent thrombocytopenia likely in setting of infectious state, known history of B-cell lymphoma.  May also have component of marrow dysfunction in setting of persistent inflammatory state.  -Overnight bleeding episode may be secondary to platelet dysfunction in setting of uremia, renal dysfunction, vitamin/nutritional deficiency.  Also with elevated Antithrombin activity.  No objective evidence of liver dysfunction. No known history of von Willebrand's disease.  -Is on heparin product however 4T score suggestive of intermiediate probability of HIT at 14%  Plan:  -Treat underlying anemia as per plan noted above  -?DDAVP  -Continue Lovenox for DVT ppx, less likely HIT    Diagnosis Lymphopenia  -In setting of high dose steroids  -Severely depressed immunoglobulins inclding IgG, IgA, Igm  -At risk for further infections  -Contact and airborne precautions  -Follow up BAL studies, CMV pcr     Diagnosis: B cell lymphoma  -Follows with heme/onc at [facility]  -S/P 6 cycles of chemotherapy in 20222  -Maintained on Rituxan for 2 year course PTA, started May 2022, last dose was 12/2024, treatment currently on hold in setting of persistent COVID-19 infection  Plan:  -Holding rituximab in setting of persistent COVID-19 infection  -Follow-up CD4 count and percentage  -Follow up anti-Rituximab ab to assess for drug induced hypersensitivity syndrome  -Lymphoma/leukemia flow cytometry      DVT ppx  with lovenox     Endo:  Diagnosis: steroid hyperglycemia and diabetes mellitus type 2, A1c at 6.6 from 01/2024  -Goal -180  -BG range last 24hrs 113-174  Plan:  -On insulin gtt     ID:  Diagnosis: Recurrent bacterial pneumonia  - Patient has completed multiple treatment courses of remdesivir throughout hospitalization in addition to 7 days of ceftriaxone.  - BAL cultures in 2/2024 positive for MDR Pseudomonas and stenotrophomonas treated with 10d course of Levaquin  - CT C/A/P 3/10 with persistent groundglass opacities and changes suggestive of sequelae of COVID, prior infections.  Completed 10d course of cefepime for broad spectrum coveragge (completed (3/13)  -Repeat BAL cultures from 3/11 showing 4+ growth Stenotrophomonas maltophilia. Could be colonization given prior BAL growth of same, however due to recent intubation with prolonged corticosteroid theraphy, will begin treating as true pathogen. Patient otherwise remains afebrile, no leukocytosis. CRP with down trending.  Previously on Bactrim from 3/13 to 3/18, discontinued due to worsening ELIESER  Plan:  -Continue minocycline 200mg bid, via NGT; hold TF 1hr pre and post minocycline adminstration.  -Plan to treat for 10d course through 3/23 w/ abx  -IV steroids as above     MSK/Skin:  Diagnosis: Midline incision wound with poor wound healing-  -General surgery performed staple removal of the patient's midline incision on 3/12 with some skin signs appreciated at the inferior aspect of the wound without obvious pus drainage. Overnight 3/13, wound dehiscence progressed requiring general surgery reevaluation. Red/brown aspirate noted, fascia intact. Wet-to-dry dressings in place. General surgery following for next Epson wound care.  -Poor wound healing in setting of high-dose steroid therapy.  No  exam no obvious signs of infection at this time.   -S/P wound vac replacement 3/13 as per gen surgery. No active concerns for cellulitis.  Plan:  -Wound VAC  management as per general surgery  -Routine monitoring, wound care as per general surgery.     Diagnosis: Critical illness myopathy  -Likely exacerbated by high-dose steroid therapy  Plan:  -Continue to wean steroids as above  -PT/OT following       Disposition: Critical care    ICU Core Measures     Vented Patient  VAP Bundle  VAP bundle ordered     A: Assess, Prevent, and Manage Pain Has pain been assessed? Yes  Need for changes to pain regimen? No   B: Both Spontaneous Awakening Trials (SATs) and Spontaneous Breathing Trials (SBTs) Plan to perform spontaneous awakening trial today? Yes   Plan to perform spontaneous breathing trial today? Yes   Obvious barriers to extubation? NA   C: Choice of Sedation RASS Goal: 0 Alert and Calm  Need for changes to sedation or analgesia regimen? No   D: Delirium CAM-ICU: Negative   E: Early Mobility  Plan for early mobility? Yes   F: Family Engagement Plan for family engagement today? Yes       Antibiotic Review: Patient on appropriate coverage based on culture data.  and Infectious disease consulted    Review of Invasive Devices:    Ortiz Plan: Continue for accurate I/O monitoring for 48 hours        Prophylaxis:  VTE VTE covered by:  heparin (porcine), Subcutaneous, 5,000 Units at 03/19/24 2142       Stress Ulcer  covered bypantoprazole (PROTONIX) injection 40 mg [[id number]]        Significant 24hr Events     24hr events: Overnight patient reported to have vaginal bleeding, epistaxis, and wound vac.  GEN surge consulted, packed wound vac site with saline gauze.  Patient with improved sinus tachycardia from before but was otherwise hemodynamically stable.  BC with persistent acute on chronic anemia, chronic thrombocytopenia.  IC panel ordered.     Subjective     Review of Systems   Unable to perform ROS: Acuity of condition        Objective                            Vitals I/O      Most Recent Min/Max in 24hrs   Temp (!) 96.6 °F (35.9 °C) Temp  Min: 96.6 °F (35.9 °C)  Max:  99.1 °F (37.3 °C)   Pulse 105 Pulse  Min: 105  Max: 138   Resp 18 Resp  Min: 15  Max: 28   /70 BP  Min: 99/58  Max: 140/73   O2 Sat 97 % SpO2  Min: 97 %  Max: 100 %      Intake/Output Summary (Last 24 hours) at 3/20/2024 0652  Last data filed at 3/20/2024 0604  Gross per 24 hour   Intake 2520.69 ml   Output 2540 ml   Net -19.31 ml       Diet NPO    Invasive Monitoring           Physical Exam   Physical Exam  Exam conducted with a chaperone present.   Eyes:      Pupils: Pupils are equal, round, and reactive to light.   Skin:     General: Skin is warm.      Coloration: Skin is not jaundiced.   HENT:      Mouth/Throat:      Mouth: Mucous membranes are moist.   Neck:      Trachea: Tracheostomy present.   Cardiovascular:      Rate and Rhythm: Regular rhythm. Tachycardia present.      Heart sounds: Normal heart sounds.   Musculoskeletal:         General: No swelling.      Right lower leg: No edema.      Left lower leg: No edema.   Abdominal: General: An ostomy site is present. There is ostomy site.     Palpations: Abdomen is soft.      Tenderness: There is no abdominal tenderness.   Constitutional:       Appearance: She is ill-appearing.   Pulmonary:      Effort: Pulmonary effort is normal.      Breath sounds: Normal breath sounds.   Neurological:      Comments: Opens eyes to voice, does not respond to pain, attempts to nods to simple commands, pain reflex intact BUE   Genitourinary/Anorectal:     Comments: No vaginal bleeding noted  Ortiz present.          Diagnostic Studies      EKG: No new ECG  Imaging: No new imaging      Medications:  Scheduled PRN   chlorhexidine, 15 mL, Q12H SARTHAK  heparin (porcine), 5,000 Units, Q8H SARTHAK  ipratropium, 0.5 mg, TID  lamoTRIgine, 150 mg, BID  levalbuterol, 1.25 mg, TID  lidocaine (PF), 10 mL, Once  methylPREDNISolone sodium succinate, 30 mg, Q6H SARTHAK   Followed by  [START ON 3/23/2024] methylPREDNISolone sodium succinate, 20 mg, Q6H SARTHAK   Followed by  [START ON 3/26/2024]  methylPREDNISolone sodium succinate, 10 mg, Q6H SARTHAK   Followed by  [START ON 3/29/2024] methylPREDNISolone sodium succinate, 10 mg, Q8H SARTHAK   Followed by  [START ON 4/1/2024] methylPREDNISolone sodium succinate, 10 mg, Q12H SARTHAK   Followed by  [START ON 4/8/2024] methylPREDNISolone sodium succinate, 10 mg, Daily  minocycline, 200 mg, BID  modafinil, 100 mg, Daily  multi-electrolyte, 1,000 mL, Once  nystatin, , BID  pantoprazole, 40 mg, Q12H SARTHAK  polyethylene glycol, 17 g, Daily  sodium chloride, 2 spray, Q4H  sodium chloride, 4 mL, TID  topiramate, 50 mg, BID  venlafaxine, 12.5 mg, TID With Meals      acetaminophen, 650 mg, Q6H PRN  fentaNYL, 50 mcg, Q1H PRN  ondansetron, 4 mg, Q6H PRN  sodium chloride, 1 Application, Q1H PRN       Continuous    insulin regular (HumuLIN R,NovoLIN R) 1 Units/mL in sodium chloride 0.9 % 100 mL infusion, 0.3-21 Units/hr, Last Rate: 1.5 Units/hr (03/20/24 0027)         Labs:    CBC    Recent Labs     03/19/24  0543 03/20/24  0457   WBC 9.92 7.88   HGB 9.5* 9.0*   HCT 28.1* 27.6*   PLT 86* 85*   BANDSPCT 7  --      DIC fibrin panel  Plasminogen 153  Fibrinogen 689  D-dimer 1.92  Antithrombin activity >140   BMP    Recent Labs     03/19/24  0543 03/20/24  0502   SODIUM 138 137   K 4.7 4.9    108   CO2 17* 18*   AGAP 14* 11   BUN 85* 88*   CREATININE 1.79* 1.63*   CALCIUM 8.4 8.7       Coags    Recent Labs     03/20/24  0457   INR 1.32*   PTT 34        Additional Electrolytes  Recent Labs     03/19/24  0543 03/20/24  0457   MG 2.3 2.4   PHOS 4.0 5.2*          Blood Gas    Recent Labs     03/20/24  0453   PHART 7.293*   [id number] 34.9*   PO2ART 105.3   MPZ0UTG 16.5*   BEART -9.2   SOURCE Radial, Right     Recent Labs     03/19/24  1237 03/20/24  0453   PHVEN 7.242*  --    [id number] 40.2*  --    PO2VEN 51.0*  --    [id number] 16.9*  --    BEVEN -9.8  --    [id number] 81.7*  --    SOURCE  --  Radial, Right    LFTs  Recent Labs     03/19/24  0543   ALT 64*   AST 29   ALKPHOS 141*   ALB 2.5*   TBILI  0.47      CK normal     Infectious  No recent results  Glucose  Recent Labs     03/19/24  0543 03/20/24  0502   GLUC 207* 117               Joe Lazcano DO

## 2024-03-20 NOTE — OCCUPATIONAL THERAPY NOTE
Occupational Therapy Cancel Note        Patient Name: Carla Hunt  Today's Date: 3/20/2024       03/20/24 0955   OT Last Visit   OT Visit Date 03/20/24   Note Type   Note Type Treatment   Cancel Reasons Medical status       Pt is not appropriate to participate in skilled OT services at this time as Code Crimson called. Will continue to follow on caseload and see when appropriate.    Kaila Michel MS, OTR/L

## 2024-03-20 NOTE — PHYSICAL THERAPY NOTE
Physical Therapy Cancellation Note    Patient's Name: Carla Hunt       03/20/24 0956   PT Last Visit   PT Visit Date 03/20/24   Note Type   Note Type Cancelled Session   Cancel Reasons Medical status  (Code Crimson called this morning - will defer PT today and follow as clinical course allows.)       Laura Kern, PT, DPT

## 2024-03-20 NOTE — PROGRESS NOTES
[facility]  Progress Note  Name: Carla Hunt I  MRN: [id number]  Unit/Bed#: MICU 12 I Date of Admission: 1/10/2024   Date of Service: 3/20/2024 I Hospital Day: 70    Assessment/Plan   Goals of care, counseling/discussion  Assessment & Plan  Code Status: full - Level 1      Palliative care patient  Assessment & Plan  Time spent providing supportive listening.   Patient to remain full code.   Decisional apparatus:  Patient is not competent on my exam today.  If competence is lost, patient's substitute decision maker would default to spouse Min by [place] Act 169.  Advance Directive / Living Will / POLST:  None on file, unable to complete    Anxiety state  Assessment & Plan  Continue Effexor per primary  Ativan 0.5mg Q6H PRN    * Acute respiratory failure with hypoxia (HCC)  Assessment & Plan  Patient was re-intubated 3/11 with subsequent bedside trach on 3/12.   Very careful steroid management and work-up per critical care team.          Interval history:       Responded to code crimson this AM. Patient developed massive bleeding from stoma sight. Surgery at bedside and able to repair acute bleed.  outside of room. Further surgical intervention TBD at this time.     MEDICATIONS / ALLERGIES:     all current active meds have been reviewed    No Known Allergies    OBJECTIVE:    Physical Exam  Physical Exam  Vitals and nursing note reviewed.   Constitutional:       General: She is in acute distress.      Appearance: She is ill-appearing and toxic-appearing.   HENT:      Head: Normocephalic and atraumatic.      Right Ear: External ear normal.      Left Ear: External ear normal.   Neck:      Comments: Trach in place  Cardiovascular:      Rate and Rhythm: Tachycardia present.   Pulmonary:      Comments: Mechanically ventilated  Skin:     Coloration: Skin is pale.     Exam limited, done from doorway due to acuity of situation.     Lab Results:   Results from last 7 days    Lab Units 03/20/24  0831 03/20/24  0827 03/20/24  0457 03/19/24  0543 03/18/24  0533 03/18/24  0429   WBC Thousand/uL  --   --  7.88 9.92  --  8.39  8.39   HEMOGLOBIN g/dL  --  8.5* 9.0* 9.5*   < > 5.6*  5.6*   I STAT HEMOGLOBIN g/dl 6.8*  --   --   --   --   --    HEMATOCRIT %  --  26.7* 27.6* 28.1*   < > 17.6*  17.6*   HEMATOCRIT, ISTAT % 20*  --   --   --   --   --    PLATELETS Thousands/uL  --   --  85* 86*  --  89*  89*   MONO PCT %  --   --   --  1*  --  0*   EOS PCT %  --   --   --  0  --  0    < > = values in this interval not displayed.     Results from last 7 days   Lab Units 03/20/24  0831 03/20/24  0502 03/19/24  0543 03/18/24  0429   POTASSIUM mmol/L  --  4.9 4.7 4.1   CHLORIDE mmol/L  --  108 107 107   CO2 mmol/L  --  18* 17* 18*   CO2, I-STAT mmol/L 15*  --   --   --    BUN mg/dL  --  88* 85* 81*   CREATININE mg/dL  --  1.63* 1.79* 1.89*   CALCIUM mg/dL  --  8.7 8.4 8.0*   ALK PHOS U/L  --   --  141*  --    ALT U/L  --   --  64*  --    AST U/L  --   --  29  --    GLUCOSE, ISTAT mg/dl 118  --   --   --        Imaging Studies: reviewed pertinent studies   EKG, Pathology, and Other Studies: reviewed pertinent studies    Counseling / Coordination of Care    Total floor / unit time spent today 25 minutes. Greater than 50% of total time was spent with the patient and / or family counseling and / or coordination of care. A description of the counseling / coordination of care: time spent assessing patient, communicating with RN, spouse at bedside, discussed with Dr. De Leon.

## 2024-03-20 NOTE — ASSESSMENT & PLAN NOTE
Time spent providing supportive listening. Reiterated role of palliative medicine to family friend (Farhan) who has been a support to José for quite some time.   Patient to remain full code, Min reiterated this today.  Decisional apparatus:  Patient is not competent on my exam today.  If competence is lost, patient's substitute decision maker would default to spouse Min by [place] Act 169.  Advance Directive / Living Will / POLST:  None on file, unable to complete

## 2024-03-20 NOTE — PLAN OF CARE
Problem: Prexisting or High Potential for Compromised Skin Integrity  Goal: Skin integrity is maintained or improved  Description: INTERVENTIONS:  - Identify patients at risk for skin breakdown  - Assess and monitor skin integrity  - Assess and monitor nutrition and hydration status  - Monitor labs   - Assess for incontinence   - Turn and reposition patient  - Assist with mobility/ambulation  - Relieve pressure over bony prominences  - Avoid friction and shearing  - Provide appropriate hygiene as needed including keeping skin clean and dry  - Evaluate need for skin moisturizer/barrier cream  - Collaborate with interdisciplinary team   - Patient/family teaching  - Consider wound care consult   Outcome: Progressing     Problem: Nutrition/Hydration-ADULT  Goal: Nutrient/Hydration intake appropriate for improving, restoring or maintaining nutritional needs  Description: Monitor and assess patient's nutrition/hydration status for malnutrition. Collaborate with interdisciplinary team and initiate plan and interventions as ordered.  Monitor patient's weight and dietary intake as ordered or per policy. Utilize nutrition screening tool and intervene as necessary. Determine patient's food preferences and provide high-protein, high-caloric foods as appropriate.     INTERVENTIONS:  - Monitor oral intake, urinary output, labs, and treatment plans  - Assess nutrition and hydration status and recommend course of action  - Evaluate amount of meals eaten  - Assist patient with eating if necessary   - Allow adequate time for meals  - Recommend/ encourage appropriate diets, oral nutritional supplements, and vitamin/mineral supplements  - Order, calculate, and assess calorie counts as needed  - Recommend, monitor, and adjust tube feedings and TPN/PPN based on assessed needs  - Assess need for intravenous fluids  - Provide specific nutrition/hydration education as appropriate  - Include patient/family/caregiver in decisions related to  nutrition  Outcome: Progressing     Problem: SAFETY,RESTRAINT: NV/NON-SELF DESTRUCTIVE BEHAVIOR  Goal: Remains free of harm/injury (restraint for non violent/non self-detsructive behavior)  Description: INTERVENTIONS:  - Instruct patient/family regarding restraint use   - Assess and monitor physiologic and psychological status   - Provide interventions and comfort measures to meet assessed patient needs   - Identify and implement measures to help patient regain control  - Assess readiness for release of restraint   Outcome: Progressing  Goal: Returns to optimal restraint-free functioning  Description: INTERVENTIONS:  - Assess the patient's behavior and symptoms that indicate continued need for restraint  - Identify and implement measures to help patient regain control  - Assess readiness for release of restraint   Outcome: Progressing     Problem: INFECTION - ADULT  Goal: Absence or prevention of progression during hospitalization  Description: INTERVENTIONS:  - Assess and monitor for signs and symptoms of infection  - Monitor lab/diagnostic results  - Monitor all insertion sites, i.e. indwelling lines, tubes, and drains  - Monitor endotracheal if appropriate and nasal secretions for changes in amount and color  - [place] appropriate cooling/warming therapies per order  - Administer medications as ordered  - Instruct and encourage patient and family to use good hand hygiene technique  - Identify and instruct in appropriate isolation precautions for identified infection/condition  Outcome: Progressing     Problem: SAFETY ADULT  Goal: Patient will remain free of falls  Description: INTERVENTIONS:  - Educate patient/family on patient safety including physical limitations  - Instruct patient to call for assistance with activity   - Consult OT/PT to assist with strengthening/mobility   - Keep Call bell within reach  - Keep bed low and locked with side rails adjusted as appropriate  - Keep care items and personal  belongings within reach  - Initiate and maintain comfort rounds  - Make Fall Risk Sign visible to staff  - Offer Toileting every 2 Hours, in advance of need  - Initiate/Maintain bed alarm  - Obtain necessary fall risk management equipment: non skid footwear  - Apply yellow socks and bracelet for high fall risk patients  - Consider moving patient to room near nurses station  Outcome: Progressing     Problem: SKIN/TISSUE INTEGRITY - ADULT  Goal: Skin Integrity remains intact(Skin Breakdown Prevention)  Description: Assess:  -Perform Flavio assessment every shift   -Clean and moisturize skin every day   -Inspect skin when repositioning, toileting, and assisting with ADLS  -Assess under medical devices such as ronny  every hour   -Assess extremities for adequate circulation and sensation     Bed Management:  -Have minimal linens on bed & keep smooth, unwrinkled  -Change linens as needed when moist or perspiring  -Avoid sitting or lying in one position for more than 2 hours while in bed  -Keep HOB at 45 degrees     Toileting:  -Offer bedside commode  -Assess for incontinence every hour  -Use incontinent care products after each incontinent episode such as moisture barrier cream     Activity:  -Mobilize patient 3 times a day  -Encourage activity and walks on unit  -Encourage or provide ROM exercises   -Turn and reposition patient every 2 Hours  -Use appropriate equipment to lift or move patient in bed  -Instruct/ Assist with weight shifting every hour when out of bed in chair  -Consider limitation of chair time 2 hour intervals    Skin Care:  -Avoid use of baby powder, tape, friction and shearing, hot water or constrictive clothing  -Relieve pressure over bony prominences using allevyn   -Do not massage red bony areas    Next Steps:  -Teach patient strategies to minimize risks such as weight shifting    -Consider consults to  interdisciplinary teams such as PT  Outcome: Progressing  Goal: Incision(s), wounds(s) or drain  site(s) healing without S/S of infection  Description: INTERVENTIONS  - Assess and document dressing, incision, wound bed, drain sites and surrounding tissue  - Provide patient and family education  Outcome: Progressing  Goal: Pressure injury heals and does not worsen  Description: Interventions:  - Implement low air loss mattress or specialty surface (Criteria met)  - Apply silicone foam dressing  - Apply fecal or urinary incontinence containment device   - Utilize friction reducing device or surface for transfers   - Consider nutrition services referral as needed  Outcome: Progressing     Problem: NEUROSENSORY - ADULT  Goal: Achieves stable or improved neurological status  Description: INTERVENTIONS  - Monitor and report changes in neurological status  - Monitor vital signs such as temperature, blood pressure, glucose, and any other labs ordered   - Initiate measures to prevent increased intracranial pressure  - Monitor for seizure activity and implement precautions if appropriate      Outcome: Progressing  Goal: Achieves maximal functionality and self care  Description: INTERVENTIONS  - Monitor swallowing and airway patency with patient fatigue and changes in neurological status  - Encourage and assist patient to increase activity and self care.   - Encourage visually impaired, hearing impaired and aphasic patients to use assistive/communication devices  Outcome: Progressing     Problem: CARDIOVASCULAR - ADULT  Goal: Maintains optimal cardiac output and hemodynamic stability  Description: INTERVENTIONS:  - Monitor I/O, vital signs and rhythm  - Monitor for S/S and trends of decreased cardiac output  - Administer and titrate ordered vasoactive medications to optimize hemodynamic stability  - Assess quality of pulses, skin color and temperature  - Assess for signs of decreased coronary artery perfusion  - Instruct patient to report change in severity of symptoms  Outcome: Progressing  Goal: Absence of cardiac  dysrhythmias or at baseline rhythm  Description: INTERVENTIONS:  - Continuous cardiac monitoring, vital signs, obtain 12 lead EKG if ordered  - Administer antiarrhythmic and heart rate control medications as ordered  - Monitor electrolytes and administer replacement therapy as ordered  Outcome: Progressing     Problem: GASTROINTESTINAL - ADULT  Goal: Minimal or absence of nausea and/or vomiting  Description: INTERVENTIONS:  - Administer IV fluids if ordered to ensure adequate hydration  - Maintain NPO status until nausea and vomiting are resolved  - Nasogastric tube if ordered  - Administer ordered antiemetic medications as needed  - Provide nonpharmacologic comfort measures as appropriate  - Advance diet as tolerated, if ordered  - Consider nutrition services referral to assist patient with adequate nutrition and appropriate food choices  Outcome: Progressing  Goal: Maintains or returns to baseline bowel function  Description: INTERVENTIONS:  - Assess bowel function  - Encourage oral fluids to ensure adequate hydration  - Administer IV fluids if ordered to ensure adequate hydration  - Administer ordered medications as needed  - Encourage mobilization and activity  - Consider nutritional services referral to assist patient with adequate nutrition and appropriate food choices  Outcome: Progressing  Goal: Maintains adequate nutritional intake  Description: INTERVENTIONS:  - Monitor percentage of each meal consumed  - Identify factors contributing to decreased intake, treat as appropriate  - Assist with meals as needed  - Monitor I&O, weight, and lab values if indicated  - Obtain nutrition services referral as needed  Outcome: Not Progressing  Goal: Establish and maintain optimal ostomy function  Description: INTERVENTIONS:  - Assess bowel function  - Encourage oral fluids to ensure adequate hydration  - Administer IV fluids if ordered to ensure adequate hydration   - Administer ordered medications as needed  -  Encourage mobilization and activity  - Nutrition services referral to assist patient with appropriate food choices  - Assess stoma site  - Consider wound care consult   Outcome: Not Progressing  Goal: Oral mucous membranes remain intact  Description: INTERVENTIONS  - Assess oral mucosa and hygiene practices  - Implement preventative oral hygiene regimen  - Implement oral medicated treatments as ordered  - Initiate Nutrition services referral as needed  Outcome: Progressing     Problem: GENITOURINARY - ADULT  Goal: Maintains or returns to baseline urinary function  Description: INTERVENTIONS:  - Assess urinary function  - Encourage oral fluids to ensure adequate hydration if ordered  - Administer IV fluids as ordered to ensure adequate hydration  - Administer ordered medications as needed  - Offer frequent toileting  - Follow urinary retention protocol if ordered  Outcome: Progressing  Goal: Urinary catheter remains patent  Description: INTERVENTIONS:  - Assess patency of urinary catheter  - If patient has a chronic marie, consider changing catheter if non-functioning  - Follow guidelines for intermittent irrigation of non-functioning urinary catheter  Outcome: Progressing     Problem: METABOLIC, FLUID AND ELECTROLYTES - ADULT  Goal: Electrolytes maintained within normal limits  Description: INTERVENTIONS:  - Monitor labs and assess patient for signs and symptoms of electrolyte imbalances  - Administer electrolyte replacement as ordered  - Monitor response to electrolyte replacements, including repeat lab results as appropriate  - Instruct patient on fluid and nutrition as appropriate  Outcome: Progressing  Goal: Fluid balance maintained  Description: INTERVENTIONS:  - Monitor labs   - Monitor I/O and WT  - Instruct patient on fluid and nutrition as appropriate  - Assess for signs & symptoms of volume excess or deficit  Outcome: Progressing  Goal: Glucose maintained within target range  Description: INTERVENTIONS:  -  Monitor Blood Glucose as ordered  - Assess for signs and symptoms of hyperglycemia and hypoglycemia  - Administer ordered medications to maintain glucose within target range  - Assess nutritional intake and initiate nutrition service referral as needed  Outcome: Progressing     Problem: HEMATOLOGIC - ADULT  Goal: Maintains hematologic stability  Description: INTERVENTIONS  - Assess for signs and symptoms of bleeding or hemorrhage  - Monitor labs  - Administer supportive blood products/factors as ordered and appropriate  Outcome: Not Progressing     Problem: MUSCULOSKELETAL - ADULT  Goal: Maintain or return mobility to safest level of function  Description: INTERVENTIONS:  - Assess patient's ability to carry out ADLs; assess patient's baseline for ADL function and identify physical deficits which impact ability to perform ADLs (bathing, care of mouth/teeth, toileting, grooming, dressing, etc.)  - Assess/evaluate cause of self-care deficits   - Assess range of motion  - Assess patient's mobility  - Assess patient's need for assistive devices and provide as appropriate  - Encourage maximum independence but intervene and supervise when necessary  - Involve family in performance of ADLs  - Assess for home care needs following discharge   - Consider OT consult to assist with ADL evaluation and planning for discharge  - Provide patient education as appropriate  Outcome: Progressing     Problem: COPING  Goal: Pt/Family able to verbalize concerns and demonstrate effective coping strategies  Description: INTERVENTIONS:  - Assist patient/family to identify coping skills, available support systems and cultural and spiritual values  - Provide emotional support, including active listening and acknowledgement of concerns of patient and caregivers  - Reduce environmental stimuli, as able  - Provide patient education  - Assess for spiritual pain/suffering and initiate spiritual care, including notification of Pastoral Care or [place]  based community as needed  - Assess effectiveness of coping strategies  Outcome: Progressing  Goal: Will report anxiety at manageable levels  Description: INTERVENTIONS:  - Administer medication as ordered  - Teach and encourage coping skills  - Provide emotional support  - Assess patient/family for anxiety and ability to cope  Outcome: Progressing     Problem: BEHAVIOR  Goal: Pt/Family maintain appropriate behavior and adhere to behavioral management agreement, if implemented  Description: INTERVENTIONS:  - Assess the family dynamic   - Encourage verbalization of thoughts and concerns in a socially appropriate manner  - Assess patient/family's coping skills and non-compliant behavior (including use of illegal substances).  - Utilize positive, consistent limit setting strategies supporting safety of patient, staff and others  - Initiate consult with Case Management, Spiritual Care or other ancillary services as appropriate  - If a patient's/visitor's behavior jeopardizes the safety of the patient, staff, or others, refer to organization procedure.   - Notify Security of behavior or suspected illegal substances which indicate the need for search of the patient and/or belongings  - Encourage participation in the decision making process about a behavioral management agreement; implement if patient meets criteria  Outcome: Progressing     Problem: Potential for Falls  Goal: Patient will remain free of falls  Description: INTERVENTIONS:  - Educate patient/family on patient safety including physical limitations  - Instruct patient to call for assistance with activity   - Consult OT/PT to assist with strengthening/mobility   - Keep Call bell within reach  - Keep bed low and locked with side rails adjusted as appropriate  - Keep care items and personal belongings within reach  - Initiate and maintain comfort rounds  - Make Fall Risk Sign visible to staff  - Offer Toileting every 2 Hours, in advance of need  -  Initiate/Maintain bed alarm  - Obtain necessary fall risk management equipment: non skid footwear  - Apply yellow socks and bracelet for high fall risk patients  - Consider moving patient to room near nurses station  Outcome: Progressing

## 2024-03-20 NOTE — PROGRESS NOTES
Progress Note- Carla Hunt 59 y.o. female MRN: [id number]    Unit/Bed#: [facility] 12 Encounter: [id number]      Assessment and Plan:    Ms. Carla Hunt is a 59-year-old female with a past medical history of B-cell lymphoma, seizure disorder status post partial left temporal lobe resection, CKD, and diabetes mellitus type 2 who was initially admitted on 1/10/2024 with acute encephalopathy and hypoxic respiratory failure in the setting of COVID-19.       Acute on Chronic Anemia  Stoma Site Bleeding  Thrombocytopenia  Prior Cecal Volvulus  Acute Hypoxic Respiratory Failure S/P Tracheostomy  B Cell Lymphoma on Rituximab     # Acute on Chronic Anemia: Patient with prolonged hospitalization after presenting with acute hypoxic respiratory failure in setting of COVID-19.  Hospital course has been complicated by acute on chronic anemia.  Patient noted to have intermittent drops in hemoglobin down to less than 6 requiring PRBC transfusion.  Status post 8 units PRBC total this admission and patient with intermittent bloody ostomy output. This morning, Code Crimson was called due to active stomal site bleeding with hemodynamic instability. S/P placement of sutures x2 with resulting hemostasis and hemodynamic improvement. Patient S/P 2U pRBC today and most recent hemoglobin 10.4. Overall, do think anemia is can largely be contributed to stoma site bleeding. However additional differentials can co-exist and include gastritis, peptic ulcer disease, AVM, or possible dieulafoy lesion.  Will plan for endoscopic evaluation later this week but in the interim on conservative management and close hemodynamic monitoring.     Plan:  Will plan for to evaluate with EGD later this week; place NPO at midnight  Continue on Protonix 40 mg IV BID  Hold AP/AC; avoid use of all NSAIDs  Trend H&H; transfuse for goal of >7.0   Monitor hemodynamics; avoid episodes of hypotension   Maintain adequate IV access with 2 large bore Ivs  Monitor  ostomy output; alert GI team of signs of overt GIB  General surgery consulted, appreciate input  Additional pain and symptom management per primary team      # Cecal Volvulus: Patient's hospital course complicated by cecal volvulus.  Patient now status post OR with ex lap ileocecectomy and end ileostomy on 2/20.  Complicated by wound dehiscence status post wound VAC placement.  Current patient currently with pink stoma and dark green output from ostomy bag.  However, intermittent blood noted in ostomy by nursing.  ______________________________________________________________________    Subjective:     Patient seen and examined at bedside. Sitting in bed comfortably in no acute distress or discomfort. Does complaint of fatigue. Offers no additional complaints.    Medication Administration - last 24 hours from 03/19/2024 1917 to 03/20/2024 1917         Date/Time Order Dose Route Action Action by     03/20/2024 1753 EDT nystatin (MYCOSTATIN) powder -- Topical Given Juvencio Oliveira RN     03/20/2024 1346 EDT nystatin (MYCOSTATIN) powder -- Topical MAR Rosangela Bowden MD     03/20/2024 1331 EDT nystatin (MYCOSTATIN) powder -- Topical MAR Hold Automatic Transfer Provider     03/20/2024 0940 EDT nystatin (MYCOSTATIN) powder -- Topical Given Harish Garner RN     03/20/2024 1346 EDT chlorhexidine (PERIDEX) 0.12 % oral rinse 15 mL -- Mouth/Throat MAR Rosangela Bowden MD     03/20/2024 1331 EDT chlorhexidine (PERIDEX) 0.12 % oral rinse 15 mL -- Mouth/Throat MAR Hold Automatic Transfer Provider     03/20/2024 0938 EDT chlorhexidine (PERIDEX) 0.12 % oral rinse 15 mL 15 mL Mouth/Throat Given Harish Garner RN     03/19/2024 2148 EDT chlorhexidine (PERIDEX) 0.12 % oral rinse 15 mL 15 mL Mouth/Throat Given Charlotte Morales RN     03/20/2024 1400 EDT levalbuterol (XOPENEX) inhalation solution 1.25 mg -- Nebulization Canceled Entry Hallie Mane, RT     03/20/2024 2446 EDT levalbuterol (XOPENEX) inhalation solution 1.25 mg --  Nebulization MAR Unhold Moises Bowden MD     03/20/2024 1331 EDT levalbuterol (XOPENEX) inhalation solution 1.25 mg -- Nebulization MAR Hold Automatic Transfer Provider     03/20/2024 1257 EDT levalbuterol (XOPENEX) inhalation solution 1.25 mg 1.25 mg Nebulization Given Hallie Mane, RT     03/20/2024 0725 EDT levalbuterol (XOPENEX) inhalation solution 1.25 mg 1.25 mg Nebulization Given Hallie Mane, RT     03/20/2024 1400 EDT ipratropium (ATROVENT) 0.02 % inhalation solution 0.5 mg -- Nebulization Canceled Entry Hallie Mane, RT     03/20/2024 1346 EDT ipratropium (ATROVENT) 0.02 % inhalation solution 0.5 mg -- Nebulization MAR Unhold Moises Bowden MD     03/20/2024 1331 EDT ipratropium (ATROVENT) 0.02 % inhalation solution 0.5 mg -- Nebulization MAR Hold Automatic Transfer Provider     03/20/2024 1256 EDT ipratropium (ATROVENT) 0.02 % inhalation solution 0.5 mg 0.5 mg Nebulization Given Hallie Mane, RT     03/20/2024 0725 EDT ipratropium (ATROVENT) 0.02 % inhalation solution 0.5 mg 0.5 mg Nebulization Given Hallie Mane, RT     03/20/2024 1754 EDT lamoTRIgine (LaMICtal) tablet 150 mg 150 mg Oral Given Juvencio Oliveira RN     03/20/2024 1346 EDT lamoTRIgine (LaMICtal) tablet 150 mg -- Oral MAR Unhold Moises Bowden MD     03/20/2024 1331 EDT lamoTRIgine (LaMICtal) tablet 150 mg -- Oral MAR Hold Automatic Transfer Provider     03/20/2024 0938 EDT lamoTRIgine (LaMICtal) tablet 150 mg 150 mg Oral Given Harish Garner RN     03/20/2024 1346 EDT polyethylene glycol (MIRALAX) packet 17 g -- Per NG Tube MAR Unhold Moises Bowden MD     03/20/2024 1331 EDT polyethylene glycol (MIRALAX) packet 17 g -- Per NG Tube MAR Hold Automatic Transfer Provider     03/20/2024 0940 EDT polyethylene glycol (MIRALAX) packet 17 g 17 g Per NG Tube Not Given Harish Garner RN     03/20/2024 1754 EDT sodium chloride (OCEAN) 0.65 % nasal spray 2 spray 2 spray Each Nare Given Juvencio Oliveira RN     03/20/2024 1346 EDT sodium chloride (OCEAN) 0.65  % nasal spray 2 spray -- Each Nare MAR Unhold Moises Bowden MD     03/20/2024 1331 EDT sodium chloride (OCEAN) 0.65 % nasal spray 2 spray -- Each Nare MAR Hold Automatic Transfer Provider     03/20/2024 1321 EDT sodium chloride (OCEAN) 0.65 % nasal spray 2 spray 2 spray Each Nare Given Juvencio Oliveira RN     03/20/2024 0941 EDT sodium chloride (OCEAN) 0.65 % nasal spray 2 spray 2 spray Each Nare Given Harish Garner RN     03/20/2024 0511 EDT sodium chloride (OCEAN) 0.65 % nasal spray 2 spray 2 spray Each Nare Given Charlotte Carmen, MARSHA     03/20/2024 0212 EDT sodium chloride (OCEAN) 0.65 % nasal spray 2 spray 2 spray Each Nare Given Charlotte Carmen, MARSHA     03/19/2024 2148 EDT sodium chloride (OCEAN) 0.65 % nasal spray 2 spray 2 spray Each Nare Given Charlotte Morales RN     03/20/2024 1346 EDT sodium chloride (AYR SALINE NASAL) nasal gel 1 Application -- Nasal MAR Unhold Moises Bowden MD     03/20/2024 1331 EDT sodium chloride (AYR SALINE NASAL) nasal gel 1 Application -- Nasal MAR Hold Automatic Transfer Provider     03/20/2024 1801 EDT insulin regular (HumuLIN R,NovoLIN R) 1 Units/mL in sodium chloride 0.9 % 100 mL infusion 0 Units/hr Intravenous Hold Juvencio Oliveira RN     03/20/2024 1750 EDT insulin regular (HumuLIN R,NovoLIN R) 1 Units/mL in sodium chloride 0.9 % 100 mL infusion 1.5 Units/hr Intravenous New Bag Juvencio Oliveira RN     03/20/2024 1612 EDT insulin regular (HumuLIN R,NovoLIN R) 1 Units/mL in sodium chloride 0.9 % 100 mL infusion 1.5 Units/hr Intravenous Rate/Dose Change Juvencio Oliveira RN     03/20/2024 1206 EDT insulin regular (HumuLIN R,NovoLIN R) 1 Units/mL in sodium chloride 0.9 % 100 mL infusion 3 Units/hr Intravenous Rate/Dose Change Juvencio Oliveira RN     03/20/2024 1023 EDT insulin regular (HumuLIN R,NovoLIN R) 1 Units/mL in sodium chloride 0.9 % 100 mL infusion 3 Units/hr Intravenous Rate/Dose Change Harish Garner RN     03/20/2024 0027 EDT insulin regular (HumuLIN R,NovoLIN R) 1 Units/mL in sodium chloride 0.9  % 100 mL infusion 1.5 Units/hr Intravenous Rate/Dose Change Charlotte Morales RN     03/19/2024 2220 EDT insulin regular (HumuLIN R,NovoLIN R) 1 Units/mL in sodium chloride 0.9 % 100 mL infusion 3 Units/hr Intravenous Rate/Dose Change Charlotte Morales RN     03/19/2024 2013 EDT insulin regular (HumuLIN R,NovoLIN R) 1 Units/mL in sodium chloride 0.9 % 100 mL infusion 0.5 Units/hr Intravenous Rate/Dose Change Charlotte Morales RN     03/20/2024 1346 EDT ondansetron (ZOFRAN) injection 4 mg -- Intravenous MAR Unhold Moises Bowden MD     03/20/2024 1331 EDT ondansetron (ZOFRAN) injection 4 mg -- Intravenous MAR Hold Automatic Transfer Provider     03/20/2024 1346 EDT acetaminophen (TYLENOL) tablet 650 mg -- Oral MAR Unhold Moises Bowden MD     03/20/2024 1331 EDT acetaminophen (TYLENOL) tablet 650 mg -- Oral MAR Hold Automatic Transfer Provider     03/20/2024 1346 EDT fentaNYL injection 50 mcg -- Intravenous MAR Unhold Moises Bowden MD     03/20/2024 1331 EDT fentaNYL injection 50 mcg -- Intravenous MAR Hold Automatic Transfer Provider     03/20/2024 1331 EDT fentaNYL injection 50 mcg 50 mcg Intravenous Given Juvencio Oliveira RN     03/20/2024 0514 EDT heparin (porcine) subcutaneous injection 5,000 Units 5,000 Units Subcutaneous Not Given Charlotte Morales RN     03/19/2024 2142 EDT heparin (porcine) subcutaneous injection 5,000 Units 5,000 Units Subcutaneous Given Charlotte Morales RN     03/20/2024 1754 EDT topiramate (TOPAMAX) 6 mg/ml oral suspension 50 mg 50 mg Per PEG Tube Given Juvencio Oliveira RN     03/20/2024 1346 EDT topiramate (TOPAMAX) 6 mg/ml oral suspension 50 mg -- Per PEG Tube MAR Unhold Moises Bowden MD     03/20/2024 1331 EDT topiramate (TOPAMAX) 6 mg/ml oral suspension 50 mg -- Per PEG Tube MAR Hold Automatic Transfer Provider     03/20/2024 1036 EDT topiramate (TOPAMAX) 6 mg/ml oral suspension 50 mg 50 mg Per PEG Tube Given Juvencio Oliveira RN     03/20/2024 0574 EDT methylPREDNISolone sodium succinate (Solu-MEDROL) injection 30 mg 30 mg  Intravenous Given Charlotte Morales RN     03/20/2024 0028 EDT methylPREDNISolone sodium succinate (Solu-MEDROL) injection 30 mg 30 mg Intravenous Given Charlotte Morales RN     03/20/2024 1400 EDT sodium chloride 3 % inhalation solution 4 mL -- Nebulization Canceled Entry Hallie Mane, RT     03/20/2024 1346 EDT sodium chloride 3 % inhalation solution 4 mL -- Nebulization MAR Unhold Moises Bowden MD     03/20/2024 1331 EDT sodium chloride 3 % inhalation solution 4 mL -- Nebulization MAR Hold Automatic Transfer Provider     03/20/2024 1254 EDT sodium chloride 3 % inhalation solution 4 mL 4 mL Nebulization Given Hallie Mane, RT     03/20/2024 0725 EDT sodium chloride 3 % inhalation solution 4 mL 4 mL Nebulization Given Hallie Mane, RT     03/20/2024 1357 EDT lidocaine (PF) (XYLOCAINE-MPF) 2 % injection 10 mL -- Inhalation MAR Unhold Joe Lazcano DO     03/20/2024 1331 EDT lidocaine (PF) (XYLOCAINE-MPF) 2 % injection 10 mL -- Inhalation MAR Hold Automatic Transfer Provider     03/20/2024 1755 EDT venlafaxine (EFFEXOR) tablet 12.5 mg 12.5 mg Oral Given Juvencio Oliveira RN     03/20/2024 1346 EDT venlafaxine (EFFEXOR) tablet 12.5 mg -- Oral MAR Unhold Moises Bowden MD     03/20/2024 1331 EDT venlafaxine (EFFEXOR) tablet 12.5 mg -- Oral MAR Hold Automatic Transfer Provider     03/20/2024 1300 EDT venlafaxine (EFFEXOR) tablet 12.5 mg 12.5 mg Oral Given Juvencio Oliveira RN     03/20/2024 1036 EDT venlafaxine (EFFEXOR) tablet 12.5 mg 12.5 mg Oral Given Juvencio Oliveira RN     03/20/2024 1346 EDT modafinil (PROVIGIL) tablet 100 mg -- Per NG Tube MAR Unhold Moises Bowden MD     03/20/2024 1331 EDT modafinil (PROVIGIL) tablet 100 mg -- Per NG Tube MAR Hold Automatic Transfer Provider     03/20/2024 0939 EDT modafinil (PROVIGIL) tablet 100 mg 100 mg Per NG Tube Not Given Harish Garner RN     03/20/2024 1346 EDT pantoprazole (PROTONIX) injection 40 mg -- Intravenous EVER Bowden MD     03/20/2024 1331 EDT pantoprazole (PROTONIX)  injection 40 mg -- Intravenous MAR Hold Automatic Transfer Provider     03/20/2024 0938 EDT pantoprazole (PROTONIX) injection 40 mg 40 mg Intravenous Given Harish Garner RN     03/19/2024 2145 EDT pantoprazole (PROTONIX) injection 40 mg 40 mg Intravenous Given Charlotte Morales RN     03/19/2024 1925 EDT minocycline (DYNACIN) tablet 200 mg 200 mg Per NG Tube Not Given Charlotte Morales RN     03/20/2024 1346 EDT minocycline (MINOCIN) capsule 200 mg -- Oral MAR Unhold Moises Bowden MD     03/20/2024 1331 EDT minocycline (MINOCIN) capsule 200 mg -- Oral MAR Hold Automatic Transfer Provider     03/20/2024 0941 EDT minocycline (MINOCIN) capsule 200 mg 200 mg Oral Given Harish Garner RN     03/19/2024 2140 EDT minocycline (MINOCIN) capsule 200 mg 200 mg Oral Given Charlotte Morales RN     03/20/2024 1357 EDT multi-electrolyte (ISOLYTE-S PH 7.4) bolus 1,000 mL -- Intravenous MAR Unhold Joe LazcanoDO     03/20/2024 1331 EDT multi-electrolyte (ISOLYTE-S PH 7.4) bolus 1,000 mL -- Intravenous MAR Hold Automatic Transfer Provider     03/20/2024 0800 EDT multi-electrolyte (ISOLYTE-S PH 7.4) bolus 1,000 mL 1,000 mL Intravenous Not Given Juvencio Oliveira RN     03/20/2024 1346 EDT methylPREDNISolone sodium succinate (Solu-MEDROL) injection 30 mg -- Intravenous MAR Unhold Moises Bowden MD     03/20/2024 1331 EDT methylPREDNISolone sodium succinate (Solu-MEDROL) injection 30 mg -- Intravenous MAR Hold Automatic Transfer Provider     03/20/2024 1320 EDT methylPREDNISolone sodium succinate (Solu-MEDROL) injection 30 mg 30 mg Intravenous Given Juvencio Oliveira RN     03/20/2024 1346 EDT methylPREDNISolone sodium succinate (Solu-MEDROL) injection 30 mg -- Intravenous MAR Unhold Moises Bowden MD     03/20/2024 1331 EDT methylPREDNISolone sodium succinate (Solu-MEDROL) injection 30 mg -- Intravenous MAR Hold Automatic Transfer Provider     03/20/2024 1346 EDT methylPREDNISolone sodium succinate (Solu-MEDROL) injection 20 mg -- Intravenous MAR Unhold Firas Kal,  "MD     03/20/2024 1331 EDT methylPREDNISolone sodium succinate (Solu-MEDROL) injection 20 mg -- Intravenous MAR Hold Automatic Transfer Provider     03/20/2024 1346 EDT methylPREDNISolone sodium succinate (Solu-MEDROL) injection 10 mg -- Intravenous MAR Unhold Moises Bowden MD     03/20/2024 1331 EDT methylPREDNISolone sodium succinate (Solu-MEDROL) injection 10 mg -- Intravenous MAR Hold Automatic Transfer Provider     03/20/2024 1346 EDT methylPREDNISolone sodium succinate (Solu-MEDROL) injection 10 mg -- Intravenous MAR Unhold Moises Bowden MD     03/20/2024 1331 EDT methylPREDNISolone sodium succinate (Solu-MEDROL) injection 10 mg -- Intravenous MAR Hold Automatic Transfer Provider     03/20/2024 1414 EDT multi-electrolyte (ISOLYTE-S PH 7.4) bolus 1,000 mL 1,000 mL Intravenous New Bag Juvencio Oliveira RN     03/20/2024 1443 EDT multi-electrolyte (PLASMALYTE-A/ISOLYTE-S PH 7.4) IV solution 0 mL/hr Intravenous Stopped Juvencio Oliveira RN     03/20/2024 1414 EDT multi-electrolyte (PLASMALYTE-A/ISOLYTE-S PH 7.4) IV solution 75 mL/hr Intravenous New Bag Juvencio Oliveira RN     03/20/2024 1630 EDT multi-electrolyte (PLASMALYTE-A/ISOLYTE-S PH 7.4) IV solution 0 mL/hr Intravenous Stopped Juvencio Oliveira RN     03/20/2024 1443 EDT multi-electrolyte (PLASMALYTE-A/ISOLYTE-S PH 7.4) IV solution 75 mL/hr Intravenous Restarted Juvencio Oliveira RN     03/20/2024 1601 EDT sodium bicarbonate 150 mEq in sterile water 1,000 mL infusion 75 mL/hr Intravenous Not Given Juvencio Oliveira RN     03/20/2024 1751 EDT sodium bicarbonate 150 mEq in dextrose 5 % 1,000 mL infusion 75 mL/hr Intravenous New Bag Juvencio Oliveira RN            Objective:     Vitals: Blood pressure 104/52, pulse (!) 125, temperature 99.6 °F (37.6 °C), temperature source Rectal, resp. rate 21, height 5' 8\" (1.727 m), weight 75.8 kg (167 lb 1.7 oz), SpO2 97%.,Body mass index is 25.41 kg/m².      Intake/Output Summary (Last 24 hours) at 3/20/2024 1917  Last data filed at 3/20/2024 " 1801  Gross per 24 hour   Intake 2060.94 ml   Output 2890 ml   Net -829.06 ml       Physical Exam  Constitutional:       General: She is not in acute distress.     Appearance: She is normal weight. She is ill-appearing. She is not toxic-appearing.   HENT:      Head: Normocephalic and atraumatic.      Mouth/Throat:      Mouth: Mucous membranes are moist.      Pharynx: Oropharynx is clear.   Eyes:      General: No scleral icterus.  Cardiovascular:      Rate and Rhythm: Normal rate.      Pulses: Normal pulses.   Pulmonary:      Comments: S/P tracheostomy  Abdominal:      General: Abdomen is flat. Bowel sounds are normal. There is no distension.      Palpations: Abdomen is soft. There is no mass.      Tenderness: There is no abdominal tenderness.   Musculoskeletal:         General: Normal range of motion.      Cervical back: Normal range of motion.      Right lower leg: No edema.      Left lower leg: No edema.   Skin:     General: Skin is warm and dry.   Psychiatric:         Mood and Affect: Mood normal.         Lab Results:  No results displayed because visit has over 200 results.      Orders Only on 03/20/2024   Component Date Value    Unit Product Code 03/20/2024 W1170P97     Unit Number 03/20/2024 [id number]     Unit ABO 03/20/2024 O     Unit RH 03/20/2024 POS     Unit Dispense Status 03/20/2024 Return to Inv     Unit Product Volume 03/20/2024 350     Unit Product Code 03/20/2024 [id number]     Unit Number 03/20/2024 [id number]     Unit ABO 03/20/2024 O     Unit RH 03/20/2024 POS     Unit Dispense Status 03/20/2024 Post Issue XM     Unit Product Volume 03/20/2024 350     Unit Product Code 03/20/2024 [id number]     Unit Number 03/20/2024 [id number]     Unit ABO 03/20/2024 O     Unit RH 03/20/2024 POS     Unit Dispense Status 03/20/2024 Return to Inv     Unit Product Volume 03/20/2024 300     Unit Product Code 03/20/2024 [id number]     Unit Number 03/20/2024 [id number]     Unit ABO 03/20/2024 O     Unit  RH 03/20/2024 POS     Unit Dispense Status 03/20/2024 Return to Inv     Unit Product Volume 03/20/2024 350     Unit Product Code 03/20/2024 E2800S21     Unit Number 03/20/2024 [id number]     Unit ABO 03/20/2024 O     Unit RH 03/20/2024 POS     Unit Dispense Status 03/20/2024 Post Issue XM     Unit Product Volume 03/20/2024 350     Unit Product Code 03/20/2024 P8755H02     Unit Number 03/20/2024 [id number]     Unit ABO 03/20/2024 O     Unit RH 03/20/2024 POS     Unit Dispense Status 03/20/2024 Return to Inv     Unit Product Volume 03/20/2024 350     Crossmatch 03/20/2024 Compatible     Crossmatch 03/20/2024 Compatible     Unit Product Code 03/20/2024 [id number]     Unit Number 03/20/2024 [id number]     Unit ABO 03/20/2024 AB     Unit RH 03/20/2024 POS     Unit Dispense Status 03/20/2024 Return to Inv     Unit Product Volume 03/20/2024 191     Unit Product Code 03/20/2024 V4406T16     Unit Number 03/20/2024 [id number]     Unit ABO 03/20/2024 AB     Unit RH 03/20/2024 POS     Unit Dispense Status 03/20/2024 Return to Inv     Unit Product Volume 03/20/2024 203     Unit Product Code 03/20/2024 R3853Z52     Unit Number 03/20/2024 [id number]     Unit ABO 03/20/2024 A     Unit RH 03/20/2024 POS     Unit Dispense Status 03/20/2024 Return to Inv     Unit Product Volume 03/20/2024 280     Unit Product Code 03/20/2024 O5124I63     Unit Number 03/20/2024 [id number]     Unit ABO 03/20/2024 A     Unit RH 03/20/2024 POS     Unit Dispense Status 03/20/2024 Return to Inv     Unit Product Volume 03/20/2024 250     Unit Product Code 03/20/2024 O6469N37     Unit Number 03/20/2024 [id number]     Unit ABO 03/20/2024 A     Unit RH 03/20/2024 POS     Unit Dispense Status 03/20/2024 Return to Inv     Unit Product Volume 03/20/2024 300     Unit Product Code 03/20/2024 [id number]     Unit Number 03/20/2024 [id number]     Unit ABO 03/20/2024 O     Unit RH 03/20/2024 POS     Unit Dispense Status 03/20/2024  Return to Inv     Unit Product Volume 03/20/2024 300     Unit Product Code 03/20/2024 G6230Y41     Unit Number 03/20/2024 [id number]     Unit ABO 03/20/2024 O     Unit RH 03/20/2024 POS     Unit Dispense Status 03/20/2024 Return to Inv     Unit Product Volume 03/20/2024 300     Unit Product Code 03/20/2024 [id number]     Unit Number 03/20/2024 [id number]     Unit ABO 03/20/2024 O     Unit RH 03/20/2024 POS     Unit Dispense Status 03/20/2024 Return to Inv     Unit Product Volume 03/20/2024 350     Unit Product Code 03/20/2024 [id number]     Unit Number 03/20/2024 [id number]     Unit ABO 03/20/2024 O     Unit RH 03/20/2024 POS     Unit Dispense Status 03/20/2024 Return to Inv     Unit Product Volume 03/20/2024 350     Unit Product Code 03/20/2024 W5051O68     Unit Number 03/20/2024 [id number]     Unit ABO 03/20/2024 AB     Unit RH 03/20/2024 POS     Unit Dispense Status 03/20/2024 Return to Inv     Unit Product Volume 03/20/2024 246        Imaging Studies: I have personally reviewed pertinent imaging studies.

## 2024-03-20 NOTE — UTILIZATION REVIEW
"Continued Stay Review    Date: 03/20                          Current Patient Class: IP  Current Level of Care: Level 2 stepdown/HOT    HPI:59 y.o. female initially admitted on 01/10     Assessment/Plan: Patient had active bleeding from the stoma site today with about 1L of BRBP stoma. Hb dropped from 9 to 6.8, code crimson activated, given 2 u prbc, 1L IVF bolus. [place] placed, vent setting adjusted d/t distress. Found active an active bleed from a small vessel was noted. Hemostasis was achieved with 2 sutures. She is having poor wound healing and easy bleeding. Suspected to also be due to prolonged high dose steroids. Chesk H&H. F/u CBC. Check CMP. Keep NPO. Check CXR. plan for more rapid steroid  taper. If she flares again, will trial steroid sparing agent. Decrease steroids to 30mg TID instead of QID. continue minocycline. Continue insulin drip. Wound Vac dressing changed by gen surg today. ID, nephrology, Gen surg ff.         Vital Signs: /52   Pulse (!) 136   Temp (!) 96.6 °F (35.9 °C)   Resp 22   Ht 5' 8\" (1.727 m)   Wt 75.8 kg (167 lb 1.7 oz)   SpO2 97%   BMI 25.41 kg/m²       Pertinent Labs/Diagnostic Results:       Results from last 7 days   Lab Units 03/20/24  1606 03/20/24  1048 03/20/24  0831 03/20/24  0827 03/20/24  0457 03/19/24  0543 03/18/24  0533 03/18/24  0429   WBC Thousand/uL  --  7.09  --   --  7.88 9.92  --  8.39  8.39   HEMOGLOBIN g/dL 10.4* 11.6  --  8.5* 9.0* 9.5*   < > 5.6*  5.6*   I STAT HEMOGLOBIN g/dl  --   --  6.8*  --   --   --   --   --    HEMATOCRIT % 30.8* 34.5*  --  26.7* 27.6* 28.1*   < > 17.6*  17.6*   HEMATOCRIT, ISTAT %  --   --  20*  --   --   --   --   --    PLATELETS Thousands/uL  --  72*  --   --  85* 86*  --  89*  89*   BANDS PCT %  --   --   --   --   --  7  --  5    < > = values in this interval not displayed.     Results from last 7 days   Lab Units 03/18/24  0429   RETIC CT ABS  87,700   RETIC CT PCT % 4.90*     Results from last 7 days   Lab Units " 03/20/24  1333 03/20/24  0831 03/20/24  0502 03/20/24  0457 03/19/24  0543 03/18/24  0429 03/17/24  0451 03/16/24  0611 03/15/24  1700 03/15/24  0603 03/14/24  0542   SODIUM mmol/L 137  --  137  --  138 135 137 137  --    < > 145   POTASSIUM mmol/L 4.8  --  4.9  --  4.7 4.1 4.3 3.9  --    < > 3.2*   CHLORIDE mmol/L 112*  --  108  --  107 107 105 107  --    < > 112*   CO2 mmol/L 15*  --  18*  --  17* 18* 19* 18*  --    < > 19*   CO2, I-STAT mmol/L  --  15*  --   --   --   --   --   --   --   --   --    ANION GAP mmol/L 10  --  11  --  14* 10 13 12  --    < > 14*   BUN mg/dL 87*  --  88*  --  85* 81* 71* 65*  --    < > 59*   CREATININE mg/dL 1.54*  --  1.63*  --  1.79* 1.89* 1.65* 1.65*  --    < > 1.55*   EGFR ml/min/1.73sq m 36  --  34  --  30 28 33 33  --    < > 36   CALCIUM mg/dL 8.7  --  8.7  --  8.4 8.0* 8.2* 8.2*  --    < > 7.7*   CALCIUM, IONIZED mmol/L  --   --   --   --   --  1.20  --   --  1.14  --  1.12   CALCIUM, IONIZED, ISTAT mmol/L  --  1.40*  --   --   --   --   --   --   --   --   --    MAGNESIUM mg/dL  --   --   --  2.4 2.3 2.3 2.2 2.1  --    < > 2.0   PHOSPHORUS mg/dL  --   --   --  5.2* 4.0 4.0 3.6 3.6  --    < > 4.0  4.0    < > = values in this interval not displayed.     Results from last 7 days   Lab Units 03/19/24  0543 03/18/24  1214   AST U/L 29  --    ALT U/L 64*  --    ALK PHOS U/L 141*  --    TOTAL PROTEIN g/dL 4.6*  --    ALBUMIN g/dL 2.5*  --    TOTAL BILIRUBIN mg/dL 0.47  --    AMMONIA umol/L  --  51     Results from last 7 days   Lab Units 03/20/24  1612 03/20/24  1413 03/20/24  1200 03/20/24  1023 03/20/24  0607 03/20/24  0423 03/20/24  0212 03/20/24  0009 03/19/24  2218 03/19/24  2010 03/19/24  1815 03/19/24  1641   POC GLUCOSE mg/dl 137 176* 170* 166* 122 121 139 136 147* 100 92 128     Results from last 7 days   Lab Units 03/20/24  1333 03/20/24  0502 03/19/24  0543 03/18/24  0429 03/17/24  0451 03/16/24  0611 03/15/24  0603 03/14/24  0542   GLUCOSE RANDOM mg/dL 186* 117 207* 139  "143* 110 133 141*             No results found for: \"BETA-HYDROXYBUTYRATE\"   Results from last 7 days   Lab Units 03/20/24  1606 03/20/24  0453 03/17/24  0812   PH ART  7.278* 7.293* 7.375   PCO2 ART mm Hg 33.8* 34.9* 32.1*   PO2 ART mm Hg 99.4 105.3 64.4*   HCO3 ART mmol/L 15.5* 16.5* 18.4*   BASE EXC ART mmol/L -10.3 -9.2 -6.1   O2 CONTENT ART mL/dL 15.4* 13.2* 10.5*   O2 HGB, ARTERIAL % 96.2 97.1* 90.3*   ABG SOURCE  Line, Arterial Radial, Right Line, Arterial     Results from last 7 days   Lab Units 03/19/24  1237   PH NICA  7.242*   PCO2 NICA mm Hg 40.2*   PO2 NICA mm Hg 51.0*   HCO3 NICA mmol/L 16.9*   BASE EXC NICA mmol/L -9.8   O2 CONTENT NICA ml/dL 11.5   O2 HGB, VENOUS % 81.7*     Results from last 7 days   Lab Units 03/20/24  0831   PH, NICA I-STAT  7.323   PCO2, NICA ISTAT mm HG 27.7*   PO2, NICA ISTAT mm HG 57.0*   HCO3, NICA ISTAT mmol/L 14.4*   I STAT BASE EXC mmol/L -11*   I STAT O2 SAT % 88*     Results from last 7 days   Lab Units 03/19/24  1721   CK TOTAL U/L 47         Results from last 7 days   Lab Units 03/20/24  0457   D-DIMER QUANTITATIVE ug/ml FEU 1.92*     Results from last 7 days   Lab Units 03/20/24  0457   PROTIME seconds 16.2*   INR  1.32*   PTT seconds 34             Results from last 7 days   Lab Units 03/20/24  0840 03/17/24  1604   LACTIC ACID mmol/L 1.2 1.6                         Results from last 7 days   Lab Units 03/20/24  0851 03/20/24  0840 03/20/24  0837 03/20/24  0834 03/20/24  0834 03/20/24  0830 03/19/24  0555   UNIT PRODUCT CODE  D2832V16  J8853R22 V9467L48 W2297T64  S1521L43  D5325E53  E4628W02 S7287T34 G0745L87  B4515H26  G0813G17  T7912H81 C4969E30  K7537G25  Q8419G50  H6591V34  L9647T95  U6978G69 C2640L16  E3539R31   UNIT NUMBER  [id number]  [id number]  [id number]  [id number]  [id number]  [id number]  [id number]  [id number]  " [id number]  [id number]  [id number]  [id number]  [id number]  [id number]   UNITABO  A  A AB O  O  O  O A AB  AB  A  A O  O  O  O  O  O A  A   UNITRH  NEG  NEG POS POS  POS  POS  POS POS POS  POS  POS  POS POS  POS  POS  POS  POS  POS NEG  NEG   CROSSMATCH  Compatible  Compatible  --   --   --   --  Compatible  Compatible Compatible  Compatible   UNIT DISPENSE STATUS  Crossmatched  Crossmatched Return to Inv Return to Inv  Return to Inv  Return to Inv  Return to Inv Return to Inv Return to Inv  Return to Inv  Return to Inv  Return to Inv Return to Inv  Post Issue XM  Return to Inv  Return to Inv  Post Issue XM  Return to Inv Presumed Trans  Presumed Trans   UNIT PRODUCT VOL mL 350  350 246 300  300  350  350 300 191  203  280  250 350  350  300  350  350  350 350  350             Results from last 7 days   Lab Units 03/19/24  0543 03/18/24  0429 03/17/24  0451 03/16/24  0611 03/15/24  0603   CRP mg/L 41.7* 38.2* 78.9* 13.4* 18.6*             Results from last 7 days   Lab Units 03/17/24  1753   SODIUM UR  40           Results from last 7 days   Lab Units 03/14/24  0542   VANCOMYCIN RM ug/mL 23.7*       Medications:   Scheduled Medications:  chlorhexidine, 15 mL, Mouth/Throat, Q12H SARTHAK  ipratropium, 0.5 mg, Nebulization, TID  lamoTRIgine, 150 mg, Oral, BID  levalbuterol, 1.25 mg, Nebulization, TID  methylPREDNISolone sodium succinate, 30 mg, Intravenous, Q8H SARTHAK   Followed by  [START ON 3/22/2024] methylPREDNISolone sodium succinate, 30 mg, Intravenous, Q12H SARTHAK   Followed by  [START ON 3/24/2024] methylPREDNISolone sodium succinate, 20 mg, Intravenous, Q12H SARTHAK   Followed by  [START ON 3/26/2024] methylPREDNISolone sodium succinate, 10 mg, Intravenous, Q12H SARTHAK   Followed by  [START ON 3/28/2024] methylPREDNISolone sodium succinate, 10 mg, Intravenous, Daily  minocycline, 200 mg, Per NG Tube, BID  modafinil, 100  mg, Per NG Tube, Daily  nystatin, , Topical, BID  pantoprazole, 40 mg, Intravenous, Q12H SARTHAK  polyethylene glycol, 17 g, Per NG Tube, Daily  sodium chloride, 2 spray, Each Nare, Q4H  sodium chloride, 4 mL, Nebulization, TID  topiramate, 50 mg, Per PEG Tube, BID  venlafaxine, 12.5 mg, Oral, TID With Meals      Continuous IV Infusions:  insulin regular (HumuLIN R,NovoLIN R) 1 Units/mL in sodium chloride 0.9 % 100 mL infusion, 0.3-21 Units/hr, Intravenous, Titrated      PRN Meds:  acetaminophen, 650 mg, Oral, Q6H PRN  fentaNYL, 50 mcg, Intravenous, Q1H PRN  ondansetron, 4 mg, Intravenous, Q6H PRN  sodium chloride, 1 Application, Nasal, Q1H PRN        Discharge Plan: D    Network Utilization Review Department  ATTENTION: Please call with any questions or concerns to [phone] and carefully listen to the prompts so that you are directed to the right person. All voicemails are confidential.   For Discharge needs, contact Care Management DC Support Team at [phone] opt. 2  Send all requests for admission clinical reviews, approved or denied determinations and any other requests to dedicated fax number below belonging to the campus where the patient is receiving treatment. List of dedicated fax numbers for the Facilities:  FACILITY NAME UR FAX NUMBER   ADMISSION DENIALS (Administrative/Medical Necessity) [phone]   DISCHARGE SUPPORT TEAM (NETWORK) [phone]   PARENT CHILD HEALTH (Maternity/NICU/Pediatrics) [phone]   [facility] [phone]   [facility] [phone]   [facility] [phone]   [facility] [phone]   [facility] [phone]   [facility] [phone]   [facility] [phone]   [facility] [phone]   [facility] [phone]   [facility]  [facility] [phone]   [facility] [phone]   [facility] [phone]

## 2024-03-21 LAB
ABO GROUP BLD BPU: NORMAL
ABO GROUP BLD: NORMAL
ANION GAP SERPL CALCULATED.3IONS-SCNC: 11 MMOL/L (ref 4–13)
ANION GAP SERPL CALCULATED.3IONS-SCNC: 11 MMOL/L (ref 4–13)
APTT PPP: 40 SECONDS (ref 23–37)
BASE EXCESS BLDA CALC-SCNC: -5.8 MMOL/L
BASE EXCESS BLDA CALC-SCNC: -7.2 MMOL/L
BLD GP AB SCN SERPL QL: NEGATIVE
BPU ID: NORMAL
BUN SERPL-MCNC: 77 MG/DL (ref 5–25)
BUN SERPL-MCNC: 79 MG/DL (ref 5–25)
CALCIUM SERPL-MCNC: 8.3 MG/DL (ref 8.4–10.2)
CALCIUM SERPL-MCNC: 8.3 MG/DL (ref 8.4–10.2)
CHLORIDE SERPL-SCNC: 110 MMOL/L (ref 96–108)
CHLORIDE SERPL-SCNC: 111 MMOL/L (ref 96–108)
CO2 SERPL-SCNC: 19 MMOL/L (ref 21–32)
CO2 SERPL-SCNC: 21 MMOL/L (ref 21–32)
CREAT SERPL-MCNC: 1.38 MG/DL (ref 0.6–1.3)
CREAT SERPL-MCNC: 1.41 MG/DL (ref 0.6–1.3)
CROSSMATCH: NORMAL
CROSSMATCH: NORMAL
CRP SERPL QL: 226.8 MG/L
ERYTHROCYTE [DISTWIDTH] IN BLOOD BY AUTOMATED COUNT: 18.2 % (ref 11.6–15.1)
ERYTHROCYTE [DISTWIDTH] IN BLOOD BY AUTOMATED COUNT: 18.5 % (ref 11.6–15.1)
FUNGITELL VALUE: >500 PG/ML
GFR SERPL CREATININE-BSD FRML MDRD: 41 ML/MIN/1.73SQ M
GFR SERPL CREATININE-BSD FRML MDRD: 42 ML/MIN/1.73SQ M
GLUCOSE SERPL-MCNC: 101 MG/DL (ref 65–140)
GLUCOSE SERPL-MCNC: 107 MG/DL (ref 65–140)
GLUCOSE SERPL-MCNC: 132 MG/DL (ref 65–140)
GLUCOSE SERPL-MCNC: 139 MG/DL (ref 65–140)
GLUCOSE SERPL-MCNC: 149 MG/DL (ref 65–140)
GLUCOSE SERPL-MCNC: 150 MG/DL (ref 65–140)
GLUCOSE SERPL-MCNC: 163 MG/DL (ref 65–140)
GLUCOSE SERPL-MCNC: 173 MG/DL (ref 65–140)
GLUCOSE SERPL-MCNC: 178 MG/DL (ref 65–140)
GLUCOSE SERPL-MCNC: 178 MG/DL (ref 65–140)
GLUCOSE SERPL-MCNC: 225 MG/DL (ref 65–140)
GLUCOSE SERPL-MCNC: 241 MG/DL (ref 65–140)
GLUCOSE SERPL-MCNC: 247 MG/DL (ref 65–140)
GLUCOSE SERPL-MCNC: 77 MG/DL (ref 65–140)
HCO3 BLDA-SCNC: 17.7 MMOL/L (ref 22–28)
HCO3 BLDA-SCNC: 18.6 MMOL/L (ref 22–28)
HCT VFR BLD AUTO: 27.9 % (ref 34.8–46.1)
HCT VFR BLD AUTO: 28.5 % (ref 34.8–46.1)
HCT VFR BLD AUTO: 29.9 % (ref 34.8–46.1)
HCT VFR BLD AUTO: 30 % (ref 34.8–46.1)
HGB BLD-MCNC: 10 G/DL (ref 11.5–15.4)
HGB BLD-MCNC: 10 G/DL (ref 11.5–15.4)
HGB BLD-MCNC: 9.4 G/DL (ref 11.5–15.4)
HGB BLD-MCNC: 9.6 G/DL (ref 11.5–15.4)
INR PPP: 1.49 (ref 0.84–1.19)
INTERPRETATION: ABNORMAL
JCPYV AB SERPL QL IA: POSITIVE
LACTATE SERPL-SCNC: 0.8 MMOL/L (ref 0.5–2)
Lab: ABNORMAL
MAGNESIUM SERPL-MCNC: 2.2 MG/DL (ref 1.9–2.7)
MCH RBC QN AUTO: 30.7 PG (ref 26.8–34.3)
MCH RBC QN AUTO: 31.1 PG (ref 26.8–34.3)
MCHC RBC AUTO-ENTMCNC: 33.7 G/DL (ref 31.4–37.4)
MCHC RBC AUTO-ENTMCNC: 33.7 G/DL (ref 31.4–37.4)
MCV RBC AUTO: 91 FL (ref 82–98)
MCV RBC AUTO: 92 FL (ref 82–98)
NRBC BLD AUTO-RTO: 2 /100 WBCS
O2 CT BLDA-SCNC: 14.4 ML/DL (ref 16–23)
O2 CT BLDA-SCNC: 14.5 ML/DL (ref 16–23)
OXYHGB MFR BLDA: 95.1 % (ref 94–97)
OXYHGB MFR BLDA: 95.5 % (ref 94–97)
PCO2 BLDA: 32.6 MM HG (ref 36–44)
PCO2 BLDA: 33.6 MM HG (ref 36–44)
PH BLDA: 7.34 [PH] (ref 7.35–7.45)
PH BLDA: 7.37 [PH] (ref 7.35–7.45)
PHOSPHATE SERPL-MCNC: 5.4 MG/DL (ref 2.7–4.5)
PLATELET # BLD AUTO: 53 THOUSANDS/UL (ref 149–390)
PLATELET # BLD AUTO: 82 THOUSANDS/UL (ref 149–390)
PMV BLD AUTO: 11.6 FL (ref 8.9–12.7)
PMV BLD AUTO: 12.9 FL (ref 8.9–12.7)
PO2 BLDA: 81.7 MM HG (ref 75–129)
PO2 BLDA: 91.4 MM HG (ref 75–129)
POTASSIUM SERPL-SCNC: 4.3 MMOL/L (ref 3.5–5.3)
POTASSIUM SERPL-SCNC: 4.4 MMOL/L (ref 3.5–5.3)
PROTHROMBIN TIME: 17.8 SECONDS (ref 11.6–14.5)
PS CM H2O: 12
PS VENT FIO2: 40
PS VENT PEEP: 6
RBC # BLD AUTO: 3.06 MILLION/UL (ref 3.81–5.12)
RBC # BLD AUTO: 3.09 MILLION/UL (ref 3.81–5.12)
REFERENCE VALUE: ABNORMAL
RH BLD: NEGATIVE
SCAN RESULT: NORMAL
SODIUM SERPL-SCNC: 141 MMOL/L (ref 135–147)
SODIUM SERPL-SCNC: 142 MMOL/L (ref 135–147)
SPECIMEN EXPIRATION DATE: NORMAL
SPECIMEN SOURCE: ABNORMAL
SPECIMEN SOURCE: ABNORMAL
UNIT DISPENSE STATUS: NORMAL
UNIT PRODUCT CODE: NORMAL
UNIT PRODUCT VOLUME: 300 ML
UNIT PRODUCT VOLUME: 350 ML
UNIT RH: NORMAL
VENT - PS: ABNORMAL
WBC # BLD AUTO: 4.87 THOUSAND/UL (ref 4.31–10.16)
WBC # BLD AUTO: 5.3 THOUSAND/UL (ref 4.31–10.16)

## 2024-03-21 PROCEDURE — 99233 SBSQ HOSP IP/OBS HIGH 50: CPT | Performed by: INTERNAL MEDICINE

## 2024-03-21 PROCEDURE — 85018 HEMOGLOBIN: CPT

## 2024-03-21 PROCEDURE — 85014 HEMATOCRIT: CPT

## 2024-03-21 PROCEDURE — 85027 COMPLETE CBC AUTOMATED: CPT

## 2024-03-21 PROCEDURE — 82948 REAGENT STRIP/BLOOD GLUCOSE: CPT

## 2024-03-21 PROCEDURE — 97530 THERAPEUTIC ACTIVITIES: CPT

## 2024-03-21 PROCEDURE — 94640 AIRWAY INHALATION TREATMENT: CPT

## 2024-03-21 PROCEDURE — 86901 BLOOD TYPING SEROLOGIC RH(D): CPT

## 2024-03-21 PROCEDURE — 83605 ASSAY OF LACTIC ACID: CPT

## 2024-03-21 PROCEDURE — C9113 INJ PANTOPRAZOLE SODIUM, VIA: HCPCS | Performed by: INTERNAL MEDICINE

## 2024-03-21 PROCEDURE — 86900 BLOOD TYPING SEROLOGIC ABO: CPT

## 2024-03-21 PROCEDURE — 30233R1 TRANSFUSION OF NONAUTOLOGOUS PLATELETS INTO PERIPHERAL VEIN, PERCUTANEOUS APPROACH: ICD-10-PCS | Performed by: INTERNAL MEDICINE

## 2024-03-21 PROCEDURE — 94664 DEMO&/EVAL PT USE INHALER: CPT

## 2024-03-21 PROCEDURE — 99233 SBSQ HOSP IP/OBS HIGH 50: CPT | Performed by: STUDENT IN AN ORGANIZED HEALTH CARE EDUCATION/TRAINING PROGRAM

## 2024-03-21 PROCEDURE — 97535 SELF CARE MNGMENT TRAINING: CPT

## 2024-03-21 PROCEDURE — 82805 BLOOD GASES W/O2 SATURATION: CPT

## 2024-03-21 PROCEDURE — 86850 RBC ANTIBODY SCREEN: CPT

## 2024-03-21 PROCEDURE — 97112 NEUROMUSCULAR REEDUCATION: CPT

## 2024-03-21 PROCEDURE — 99291 CRITICAL CARE FIRST HOUR: CPT | Performed by: INTERNAL MEDICINE

## 2024-03-21 PROCEDURE — 85730 THROMBOPLASTIN TIME PARTIAL: CPT

## 2024-03-21 PROCEDURE — 85610 PROTHROMBIN TIME: CPT

## 2024-03-21 PROCEDURE — 86140 C-REACTIVE PROTEIN: CPT

## 2024-03-21 PROCEDURE — 80048 BASIC METABOLIC PNL TOTAL CA: CPT

## 2024-03-21 PROCEDURE — 83735 ASSAY OF MAGNESIUM: CPT | Performed by: STUDENT IN AN ORGANIZED HEALTH CARE EDUCATION/TRAINING PROGRAM

## 2024-03-21 PROCEDURE — 94760 N-INVAS EAR/PLS OXIMETRY 1: CPT

## 2024-03-21 PROCEDURE — 84100 ASSAY OF PHOSPHORUS: CPT

## 2024-03-21 PROCEDURE — P9037 PLATE PHERES LEUKOREDU IRRAD: HCPCS

## 2024-03-21 PROCEDURE — 94003 VENT MGMT INPAT SUBQ DAY: CPT

## 2024-03-21 PROCEDURE — 99233 SBSQ HOSP IP/OBS HIGH 50: CPT | Performed by: PHYSICIAN ASSISTANT

## 2024-03-21 RX ORDER — SODIUM CHLORIDE, SODIUM GLUCONATE, SODIUM ACETATE, POTASSIUM CHLORIDE, MAGNESIUM CHLORIDE, SODIUM PHOSPHATE, DIBASIC, AND POTASSIUM PHOSPHATE .53; .5; .37; .037; .03; .012; .00082 G/100ML; G/100ML; G/100ML; G/100ML; G/100ML; G/100ML; G/100ML
500 INJECTION, SOLUTION INTRAVENOUS ONCE
Status: COMPLETED | OUTPATIENT
Start: 2024-03-21 | End: 2024-03-21

## 2024-03-21 RX ORDER — ENOXAPARIN SODIUM 100 MG/ML
40 INJECTION SUBCUTANEOUS
Status: DISCONTINUED | OUTPATIENT
Start: 2024-03-21 | End: 2024-03-25

## 2024-03-21 RX ADMIN — TOPIRAMATE 50 MG: 100 TABLET, FILM COATED ORAL at 18:23

## 2024-03-21 RX ADMIN — VENLAFAXINE 12.5 MG: 25 TABLET ORAL at 12:50

## 2024-03-21 RX ADMIN — LAMOTRIGINE 150 MG: 100 TABLET ORAL at 09:06

## 2024-03-21 RX ADMIN — Medication 2 SPRAY: at 02:13

## 2024-03-21 RX ADMIN — PANTOPRAZOLE SODIUM 40 MG: 40 INJECTION, POWDER, FOR SOLUTION INTRAVENOUS at 09:06

## 2024-03-21 RX ADMIN — NYSTATIN: 100000 POWDER TOPICAL at 18:23

## 2024-03-21 RX ADMIN — Medication 2 SPRAY: at 09:07

## 2024-03-21 RX ADMIN — MINOCYCLINE HYDROCHLORIDE 200 MG: 50 TABLET, FILM COATED ORAL at 09:02

## 2024-03-21 RX ADMIN — Medication 2 SPRAY: at 18:23

## 2024-03-21 RX ADMIN — ENOXAPARIN SODIUM 40 MG: 40 INJECTION SUBCUTANEOUS at 09:06

## 2024-03-21 RX ADMIN — TOPIRAMATE 50 MG: 100 TABLET, FILM COATED ORAL at 09:13

## 2024-03-21 RX ADMIN — VENLAFAXINE 12.5 MG: 25 TABLET ORAL at 18:25

## 2024-03-21 RX ADMIN — IPRATROPIUM BROMIDE 0.5 MG: 0.5 SOLUTION RESPIRATORY (INHALATION) at 20:28

## 2024-03-21 RX ADMIN — IPRATROPIUM BROMIDE 0.5 MG: 0.5 SOLUTION RESPIRATORY (INHALATION) at 07:13

## 2024-03-21 RX ADMIN — METHYLPREDNISOLONE SODIUM SUCCINATE 30 MG: 40 INJECTION, POWDER, FOR SOLUTION INTRAMUSCULAR; INTRAVENOUS at 22:46

## 2024-03-21 RX ADMIN — Medication 2 SPRAY: at 14:12

## 2024-03-21 RX ADMIN — LEVALBUTEROL HYDROCHLORIDE 1.25 MG: 1.25 SOLUTION RESPIRATORY (INHALATION) at 07:13

## 2024-03-21 RX ADMIN — NYSTATIN: 100000 POWDER TOPICAL at 09:07

## 2024-03-21 RX ADMIN — MODAFINIL 100 MG: 100 TABLET ORAL at 09:06

## 2024-03-21 RX ADMIN — CHLORHEXIDINE GLUCONATE 0.12% ORAL RINSE 15 ML: 1.2 LIQUID ORAL at 20:28

## 2024-03-21 RX ADMIN — SODIUM CHLORIDE SOLN NEBU 3% 4 ML: 3 NEBU SOLN at 07:13

## 2024-03-21 RX ADMIN — LEVALBUTEROL HYDROCHLORIDE 1.25 MG: 1.25 SOLUTION RESPIRATORY (INHALATION) at 13:39

## 2024-03-21 RX ADMIN — VENLAFAXINE 12.5 MG: 25 TABLET ORAL at 09:03

## 2024-03-21 RX ADMIN — SODIUM CHLORIDE 1 UNITS/HR: 9 INJECTION, SOLUTION INTRAVENOUS at 00:38

## 2024-03-21 RX ADMIN — CHLORHEXIDINE GLUCONATE 0.12% ORAL RINSE 15 ML: 1.2 LIQUID ORAL at 09:06

## 2024-03-21 RX ADMIN — IPRATROPIUM BROMIDE 0.5 MG: 0.5 SOLUTION RESPIRATORY (INHALATION) at 13:39

## 2024-03-21 RX ADMIN — SODIUM CHLORIDE, SODIUM GLUCONATE, SODIUM ACETATE, POTASSIUM CHLORIDE, MAGNESIUM CHLORIDE, SODIUM PHOSPHATE, DIBASIC, AND POTASSIUM PHOSPHATE 500 ML: .53; .5; .37; .037; .03; .012; .00082 INJECTION, SOLUTION INTRAVENOUS at 13:45

## 2024-03-21 RX ADMIN — FENTANYL CITRATE 50 MCG: 50 INJECTION INTRAMUSCULAR; INTRAVENOUS at 13:44

## 2024-03-21 RX ADMIN — METHYLPREDNISOLONE SODIUM SUCCINATE 30 MG: 40 INJECTION, POWDER, FOR SOLUTION INTRAMUSCULAR; INTRAVENOUS at 05:47

## 2024-03-21 RX ADMIN — PANTOPRAZOLE SODIUM 40 MG: 40 INJECTION, POWDER, FOR SOLUTION INTRAVENOUS at 20:28

## 2024-03-21 RX ADMIN — MINOCYCLINE HYDROCHLORIDE 200 MG: 50 TABLET, FILM COATED ORAL at 18:23

## 2024-03-21 RX ADMIN — Medication 2 SPRAY: at 22:19

## 2024-03-21 RX ADMIN — SODIUM CHLORIDE SOLN NEBU 3% 4 ML: 3 NEBU SOLN at 13:40

## 2024-03-21 RX ADMIN — POLYETHYLENE GLYCOL 3350 17 G: 17 POWDER, FOR SOLUTION ORAL at 09:06

## 2024-03-21 RX ADMIN — Medication 2 SPRAY: at 05:47

## 2024-03-21 RX ADMIN — LEVALBUTEROL HYDROCHLORIDE 1.25 MG: 1.25 SOLUTION RESPIRATORY (INHALATION) at 20:28

## 2024-03-21 RX ADMIN — SODIUM CHLORIDE SOLN NEBU 3% 4 ML: 3 NEBU SOLN at 20:28

## 2024-03-21 RX ADMIN — LAMOTRIGINE 150 MG: 100 TABLET ORAL at 18:23

## 2024-03-21 RX ADMIN — METHYLPREDNISOLONE SODIUM SUCCINATE 30 MG: 40 INJECTION, POWDER, FOR SOLUTION INTRAMUSCULAR; INTRAVENOUS at 14:07

## 2024-03-21 NOTE — PLAN OF CARE
Problem: OCCUPATIONAL THERAPY ADULT  Goal: Performs self-care activities at highest level of function for planned discharge setting.  See evaluation for individualized goals.  Description: Treatment Interventions: ADL retraining, Functional transfer training, UE strengthening/ROM, Endurance training, Patient/family training, Equipment evaluation/education, Compensatory technique education, Continued evaluation, Energy conservation, Activityengagement          See flowsheet documentation for full assessment, interventions and recommendations.   Outcome: Progressing  Note: Limitation: Decreased ADL status, Decreased UE ROM, Decreased UE strength, Decreased Safe judgement during ADL, Decreased cognition, Decreased endurance, Decreased self-care trans, Decreased high-level ADLs, Non-func R UE, Non-func L UE  Prognosis: Fair, Poor  Assessment: Pt is a 58 yo female who participated in skilled OT session on 3/21/2024. Pt seen with PT to increase safety, decrease fall risk, and maximize functional/occupational performance 2* medical complexity which is a regression from pt's functional baseline. Treatment focused to improve functional transfers with fall prevention strategies, static/dynamic balance, postural/trunk control, proper body mechanics, functional use of b/l UE's, higher level cognitive functions, safety awareness, and overall increased activity tolerance in ADL/IADL/leisure tasks. Upon arrival, pt found lying supine in bed and was agreeable to OT session. Pt performing supine <> sit with Max A x2 and sat EOB for approx 20 minutes with varying Mod/Max A sitting balance. Pt engaging in various grooming ADLs with varying Max/total assistance. Pt performed x2 STSE w/ Max A x2-3 w/ b/l HHA and stood for approx 5-7 seconds. At the end of the session, pt was left lying supine in bed in chair position with all functional needs in reach. Pt demonstrates gradual functional improvements towards OT goals however continues  to be functioning below occupational baseline and is still limited by the following limitations/impairments which were addressed through skilled instruction: cognition, functional ROM, fine motor skills, generalized weakness, balance, endurance/activity tolerance, postural/trunk control, strength, pain, and safety awareness. At this time, recommend discharge to post-acute rehab when medically stable. The patient's raw score on the -PAC Daily Activity Inpatient Short Form is 11. A raw score of less than 19 suggests the patient may benefit from discharge to post-acute rehabilitation services. Please refer to the recommendation of the Occupational Therapist for safe discharge planning.  Established OT goals will be continued 2-3x/wk to address acute care needs and underlying performance skills to maximize occupational performance and safety to return to PLOF.     Rehab Resource Intensity Level, OT: II (Moderate Resource Intensity)

## 2024-03-21 NOTE — CASE MANAGEMENT
Case Management Progress Note    Patient name Carla Hunt  Location MICU 12/MICU 12 MRN [id number]  : 1965 Date 3/21/2024       LOS (days): 71  Geometric Mean LOS (GMLOS) (days):   Days to GMLOS:        OBJECTIVE:        Current admission status: Inpatient  Preferred Pharmacy:   [id number] [place] - [address] [place] [address]  Phone: [phone] Fax: [phone]    [facility] - [address] [address]  Phone: [phone] Fax: [phone]    Primary Care Provider: Tony Guevara MD    Primary Insurance: MEDICARE  Secondary Insurance: [employer]    PROGRESS NOTE: CM team following as needed for post acute planning, patient pending medical stability.

## 2024-03-21 NOTE — RESPIRATORY THERAPY NOTE
RT Ventilator Management Note  Carla Hunt 59 y.o. female MRN: [id number]  Unit/Bed#: [facility] 12 Encounter: [id number]      Daily Screen         3/20/2024  0731 3/21/2024  0713          Patient safety screen outcome:: Passed Passed      Not Ready for Weaning due to:: -- --                Physical Exam:   Assessment Type: Assess only  General Appearance: Sleeping  Respiratory Pattern: Spontaneous, Assisted  Chest Assessment: Chest expansion symmetrical  Bilateral Breath Sounds: Coarse  Suction: (P) Trach  O2 Device: (P) TC      Resp Comments: (P) Pt taken off vent, cuff deflated, placed on 40% TC. Pt tolerating well.

## 2024-03-21 NOTE — SPEECH THERAPY NOTE
Speech Language/Pathology  Order received, chart reviewed. Arrived to assess pt with PMV however she remains on the vent with providers bedside.  Will follow as able as well as  when pt is successfully on trach collar trials.

## 2024-03-21 NOTE — PROGRESS NOTES
[facility]  Progress Note: Critical Care  Name: Carla Hunt 59 y.o. female I MRN: [id number]  Unit/Bed#: MICU 12 I Date of Admission: 1/10/2024   Date of Service: 3/21/2024 I Hospital Day: 71    Assessment/Plan   Acute hypoxic respiratory failure; vent dependent; s/p tracheostomy 3/12  Bilateral ground glass opacities, persistent  Persistent COVID-19 infection; s/p 14d course of antivirals completed 3/15, superimpoved by #4  Stenotrophomonas pneumonia; recurrent, previously on Bactrim, switched to minocycline 2/2 #7  Acute on chronic anemia- multifactorial-blood loss from ostomy site now resolved s/p suturing of bleeding vessel, chronic inflammation, iatrogenic also contributing  Severe Metabolic encephalopathy, in setting of #9, improving  ELIESER, pre-renal, on CKD3, improving  Uremia  Thrombocytopenia  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; complicated by #12  Wound dehiscence 2/2 poor wound healing s/p wound vac 3/13   B-Cell lymphoma, s/p chemotheraphy 2022, Rituxan maintenance therapy on hold  Minimal SAH POA, no interventions required, resolved on repeat CTHS  Seizure disorder; on home AEDs  Steroid induced hyperglycemia; controlled on insulin gtt  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  -Most recent repeat CTH 3/13 negative for acute intracranial findings.  Has had extensive neurological workup including MRI/CT neuroimaging, LP with unremarkable findings  -Etiology unclear. Has been off sedation. Electrolytes, sodium, BS at  goal. Ammonia normal. May be prolonged 2/2 PNA, possibly worsened by uremic encephalopathy worsened by ELIESER, ?UGIB, however GFR not <15; candida growth seen on BAL Cx from 3/11, may be respiraotry colonization, however fungal infection not ruled in setting of severe lymphopenia, depressed immunoglobulins.      Plan:  -Avoid PRN fentanyl/sedative meds as able.  -Continue modafinil  100mg qAM to stimulate arousal per palliative    -Continue to wean steroids more aggressively as planned  -ABX tx with PNA as below  -Will hold off on starting empiric antifungal given improvement in neurological exam however may be consideration if clinical status does not improve.  -Frequent neurochecks   -Can consider MRI brain to evaluate for eval for encephalitis/inflammatory process, however may not   -Video EEG deferred at thist time due to low suspicion for seizures. Less likely non-convulsive status epilepticus as patient is on home AEDs.     Diagnosis: seizure disorder, known history  -S/p partial left temporal lobe resection in 2007 2/2 ?complex migraines  -On Lamictal 150 bid and Topamax 50mg bid  -Last lamotrigine level from 03/02 at 10.7 (therapeutic)     Diagnosis: Anxiety, known history  -On Effexor 12.5 mg TID     CV:  -Diagnosis: Sinus tachycarida  -In setting of blood loss anemia/hypovolemia, improving with PRBC transfusions and IVF  -TTE 3/17 with no major structural dysfunction  -May be 2/2 dehydration/hypvol, acute on chronic anemia, underlying infectious process, pain, less likely Modafinal-induced as patient was persistently tachy prior   Plan:  - See plans under Pulm, Heme/Onc, ID        Pulm:  Diagnosis: Acute hypoxic respiratory failure  Diagnosis: Bilateral ground glass opacities, persistent  -Vent dependent; s/p trach 3/12 after was reintubated 3/11 due to increased WOB,elevated CRP  -Persistently COVID+ s/p multiple courses of antivirals (most recent completed 3/15). Complicated by recurrent  bacterial PNA treated w 10d levaquin (completed 2/25) for MDR PNA; most recent BAL +recurrent stenotrophomona treated with Bactrim, swithced to minocyline today 2/2 ELIESER  -Etiology Likely multifactorial including possible COVID pneumonitis vs recurrent PNA vs hypersensitivity pneumonitis 2/2 ?rituxumab. Persistent inflammation likely given patient has been steroid responsive throughout admission. CRP continues downward trend. Lack of clinical improvement despite steroids, ABx, antivirals. Fungal source not ruled out in setting of severe lymphopheia, low immunoglobulins.  -Repeat CXR imaging 3/20 with mild improvement in multifocal bronchopneumonia  Plan:  -Finished 14d course of Paxlovid/remdesivir on 3/15  -Continue minocycline 200mg bid via NGT to tx Stenotrophomonas PNA as below  -Steroid wean- IV Solu-Medrol 30 mg q6h x3d. Plan for slow steroid wean by 10mg every 3d until patient is down to 10mg q6h and then start decreasing frequency till patient is off.  -Follow up BAL viral negative  -Follow up Fungitell  -Follow up anti-Rituximab ab  -Wean PS ventilation, currently on 5/6/40%- attempt SBT today       GI:  Diagnosis: Partial cecal volvulus s/p right hemicolectomy with ostomy pouch in place  -Complicated by poor wound healing of midline incision as evidenced by wound dehiscence s/p wound vac that was removed 3/17 by gen surg.  -Stoma with dark brown output, consistent with prior  Plan:  -Wound vac as per general surgery  -Monitor ostomy pouch output  -Surgery following, will keep them updated if any further changes     :  Diagnosis: ELIESER on CKD III  -Baseline Cr  0.8-1.2  -Cr remains elevated but improving today, Cr 1.43, BUN remains elevated but improving  -UO adequate, on Ortiz, draining clear yellow urine  -Prerenal azotemia 2/2 hypovolemia, anemia-?blood loss, likely exacerbated by bactrim use (increased Cr secretion), however remains off Bactrim x2d with improvement in serum creatinine  Plan:  -S/P IVF,  s/p 2U prbc's on 3/19, Hgb stable  -Bactrim switched to minocycline, will continue  -Trend daily BMP  -Continue to monitor I/O and UOP  -Avoid nephrotoxins, contrast dye as able     Diagnosis: NAGMA, resolved  -Cl normal, positive urine gap. No loose stools/diarrhea reported  -Ddx include 2/2 uremic acidosis and ELIESER vs RTA2 from Topamax given positive UG. Unlikely RTA1 given absence of hypokalemia and normal serum Cl  Plan:  -s/p sodom bicarb drip overnight, NAGMA currently resolved  -Bactrim switched to minocycline, day 2, with improvement in serum Cr  -Treat underlying uremia, planned EGD next week as per GI to rule out UGIB  -Optimize electrolytes including potassium/monitor hypokalemia  -?  Consider discontinuation of topiramate  -Trend bmp daily     F/E/N:  F: none  E: monitor and replete for goal K>4, P>3, Mg>2  N: Resume tube feeds ;with vital 1.5; 45 cc/h, Prosource liquid protein to 1 packet BID     Heme/Onc:  Diagnosis: Acute on chronic anemia  -Baseline Hgb ~7-8. S/P 2U PRBCs 3/17 after repeat Hgb 5.3, total of 6U transfused since admission.  -Patient had significant blood loss from ostomy site 3/20, resulting in hemorraghic shock, improved with blood transfusion; hemostasis achieved after bleeding source identified and sutured. Repeat H&H stable.   -?Worsened by impaired marrow response liekly 2/2 persistent inflammation due to low retic index ~1 prior to most recent transfusions; normocytic with normal B12/folate levels; MCV<100. Iatrogenic cause likely contributing 2/2 frequent blood draws; chronic anemia likely persistent inflammatory state/CKD/lymphoma in setting of elevated ferritin of 974. SPEP/UPEP negative. Hemolysis labs negative.   Plan:  -Monitor ostomy output  -Continue tube feeds/nutritional support  ·Trend CBC, transfuse to maintain Hgb>7     Diagnosis: Thrombocytopenia  -Persistent thrombocytopenia likely in setting of infectious state, known history of B-cell lymphoma.  May also have  component of marrow dysfunction in setting of persistent inflammatory state. No objective evidence of liver dysfunction. No known history of von Willebrand's disease.  -Is on heparin product however 4T score suggestive of intermiediate probability of HIT at 14%  Plan:  -Treat underlying anemia as per plan noted above  -Lovenox for DVT ppx     Diagnosis Lymphopenia  -In setting of high dose steroids  -Severely depressed immunoglobulins inclding IgG, IgA, Igm  -At risk for further infections  -Contact and airborne precautions  -Follow up CD4 count     Diagnosis: B cell lymphoma  -Follows with heme[facility]onc at [facility]  -S/P 6 cycles of chemotherapy in 20222  -Maintained on Rituxan for 2 year course PTA, started May 2022, last dose was 12/2024, treatment currently on hold in setting of persistent COVID-19 infection  Plan:  -Holding rituximab in setting of persistent COVID-19 infection  -Follow-up CD4 count and percentage  -Follow up anti-Rituximab ab to assess for drug induced hypersensitivity syndrome  -Lymphoma/leukemia flow cytometry      DVT ppx: with lovenox     Endo:  Diagnosis: steroid hyperglycemia and diabetes mellitus type 2, A1c at 6.6 from 01/2024  -Goal -180  -BG range last 24hrs 113-174  Plan:  -On insulin gtt     ID:  Diagnosis: Recurrent bacterial pneumonia  - Patient has completed multiple treatment courses of remdesivir throughout hospitalization in addition to 7 days of ceftriaxone.  - BAL cultures in 2/2024 positive for MDR Pseudomonas and stenotrophomonas treated with 10d course of Levaquin  - CT C/A/P 3/10 with persistent groundglass opacities and changes suggestive of sequelae of COVID, prior infections.  Completed 10d course of cefepime for broad spectrum coveragge (completed (3/13)  -Repeat BAL cultures from 3/11 showing 4+ growth Stenotrophomonas maltophilia. Could be colonization given prior BAL growth of same, however due to recent intubation with prolonged corticosteroid theraphy, will begin  treating as true pathogen. Patient otherwise remains afebrile, no leukocytosis. CRP with down trending.  Previously on Bactrim from 3/13 to 3/18, discontinued due to worsening ELIESER  Plan:  -Continue minocycline 200mg bid, via NGT; hold TF 1hr pre and post minocycline adminstration.  -Plan to treat for 14d course through 3/26 w/ abx  -IV steroids as above     MSK/Skin:  Diagnosis: Midline incision wound with poor wound healing-  -General surgery performed staple removal of the patient's midline incision on 3/12 with some skin signs appreciated at the inferior aspect of the wound without obvious pus drainage. Overnight 3/13, wound dehiscence progressed requiring general surgery reevaluation. Red/brown aspirate noted, fascia intact. Wet-to-dry dressings in place. General surgery following for next [facility]pson wound care.  -Poor wound healing in setting of high-dose steroid therapy.  No  exam no obvious signs of infection at this time.   -S/P wound vac replacement 3/13 as per gen surgery. No active concerns for cellulitis.  Plan:  -Wound VAC management as per general surgery  -Routine monitoring, wound care as per general surgery.     Diagnosis: Critical illness myopathy  -Likely exacerbated by high-dose steroid therapy  Plan:  -Continue to wean steroids as above  -PT/OT following        Disposition: Critical care    ICU Core Measures     Vented Patient  VAP Bundle  VAP bundle ordered     A: Assess, Prevent, and Manage Pain Has pain been assessed? Yes  Need for changes to pain regimen? NA   B: Both Spontaneous Awakening Trials (SATs) and Spontaneous Breathing Trials (SBTs) Plan to perform spontaneous awakening trial today? Yes   Plan to perform spontaneous breathing trial today? Yes   Obvious barriers to extubation? No   C: Choice of Sedation RASS Goal: 0 Alert and Calm  Need for changes to sedation or analgesia regimen? NA   D: Delirium CAM-ICU: Positive   E: Early Mobility  Plan for early mobility? Yes   F: Family Engagement  Plan for family engagement today? Yes       Antibiotic Review: Patient on appropriate coverage based on culture data.     Review of Invasive Devices:    Ortiz Plan: Continue for accurate I/O monitoring for 48 hours    [place] Plan: Keep arterial line for hemodynamic monitoring and frequent ABGs    Prophylaxis:  VTE Lovenox   Stress Ulcer  covered bypantoprazole (PROTONIX) injection 40 mg [[id number]]        Significant 24hr Events     24hr events: NAOE     Subjective     Review of Systems   Unable to perform ROS: Acuity of condition        Objective                            Vitals I/O      Most Recent Min/Max in 24hrs   Temp 98 °F (36.7 °C) Temp  Min: 96.1 °F (35.6 °C)  Max: 100 °F (37.8 °C)   Pulse (!) 116 Pulse  Min: 116  Max: 138   Resp 15 Resp  Min: 11  Max: 33   /52 BP  Min: 104/52  Max: 126/50   O2 Sat 98 % SpO2  Min: 91 %  Max: 99 %      Intake/Output Summary (Last 24 hours) at 3/21/2024 0659  Last data filed at 3/21/2024 0600  Gross per 24 hour   Intake 2949.46 ml   Output 2890 ml   Net 59.46 ml       Diet NPO    Invasive Monitoring   Arterial Line  [place] /57  Arterial Line BP  Min: 107/56  Max: 152/61   MAP 81 mmHg  Arterial Line MAP (mmHg)  Min: 70 mmHg  Max: 91 mmHg           Physical Exam   Physical Exam  Eyes:      Pupils: Pupils are equal, round, and reactive to light.   Skin:     General: Skin is warm.      Coloration: Skin is not pale.   HENT:      Nose: Nasogastric tube present. No epistaxis.      Mouth/Throat:      Mouth: Mucous membranes are moist.   Neck:      Vascular: No JVD.      Trachea: Tracheostomy present.   Cardiovascular:      Rate and Rhythm: Tachycardia present.      Heart sounds: Normal heart sounds.   Musculoskeletal:         General: No swelling.      Right lower leg: No edema.      Left lower leg: No edema.   Abdominal: General: There is no distension.      Palpations: Abdomen is soft.      Tenderness: There is no abdominal tenderness.   Constitutional:       Appearance:  She is ill-appearing.   Pulmonary:      Effort: No accessory muscle usage, respiratory distress or accessory muscle usage.      Breath sounds: Normal breath sounds. No rales.   Neurological:      Comments: Opens eyes to voice, tracks, PERRL   Genitourinary/Anorectal:  Ortiz present.          Diagnostic Studies      EKG: no new ECG  Imaging:CXR 3/20 Overall mild improvement in multifocal bronchopneumonia, likely aspiration related. I have personally reviewed pertinent reports.       Medications:  Scheduled PRN   chlorhexidine, 15 mL, Q12H SARTHAK  ipratropium, 0.5 mg, TID  lamoTRIgine, 150 mg, BID  levalbuterol, 1.25 mg, TID  methylPREDNISolone sodium succinate, 30 mg, Q8H SARTHAK   Followed by  [START ON 3/22/2024] methylPREDNISolone sodium succinate, 30 mg, Q12H SARTHAK   Followed by  [START ON 3/24/2024] methylPREDNISolone sodium succinate, 20 mg, Q12H SARTHAK   Followed by  [START ON 3/26/2024] methylPREDNISolone sodium succinate, 10 mg, Q12H SARTHAK   Followed by  [START ON 3/28/2024] methylPREDNISolone sodium succinate, 10 mg, Daily  minocycline, 200 mg, BID  modafinil, 100 mg, Daily  nystatin, , BID  pantoprazole, 40 mg, Q12H SARTHAK  polyethylene glycol, 17 g, Daily  sodium chloride, 2 spray, Q4H  sodium chloride, 4 mL, TID  topiramate, 50 mg, BID  venlafaxine, 12.5 mg, TID With Meals      acetaminophen, 650 mg, Q6H PRN  fentaNYL, 50 mcg, Q1H PRN  ondansetron, 4 mg, Q6H PRN  sodium chloride, 1 Application, Q1H PRN       Continuous    insulin regular (HumuLIN R,NovoLIN R) 1 Units/mL in sodium chloride 0.9 % 100 mL infusion, 0.3-21 Units/hr, Last Rate: 0.5 Units/hr (03/21/24 0553)         Labs:    CBC    Recent Labs     03/20/24  1048 03/20/24  1606 03/21/24  0034 03/21/24  0552   WBC 7.09  --   --  4.87   HGB 11.6   < > 10.0* 9.6*   HCT 34.5*   < > 30.0* 28.5*   PLT 72*  --   --  53*    < > = values in this interval not displayed.     BMP    Recent Labs     03/20/24  1333 03/21/24  0552   SODIUM 137 142   K 4.8 4.3   * 110*    CO2 15* 21   AGAP 10 11   BUN 87* 79*   CREATININE 1.54* 1.41*   CALCIUM 8.7 8.3*       Coags    Recent Labs     03/20/24  0457   INR 1.32*   PTT 34        Additional Electrolytes  Recent Labs     03/20/24  0457 03/20/24  0831 03/21/24  0557   MG 2.4  --  2.2   PHOS 5.2*  --  5.4*   CAIONIZED  --  1.40*  --           Blood Gas    Recent Labs     03/21/24  0018   PHART 7.339*   BAO6XUD 33.6*   PO2ART 91.4   ZMK7QXC 17.7*   BEART -7.2   SOURCE Line, Arterial     Recent Labs     03/19/24  1237 03/20/24  0453 03/21/24  0018   PHVEN 7.242*  --   --    UUY7DQU 40.2*  --   --    PO2VEN 51.0*  --   --    NGO8TCA 16.9*  --   --    BEVEN -9.8  --   --    D7TYCOY 81.7*  --   --    SOURCE  --    < > Line, Arterial    < > = values in this interval not displayed.    LFTs  No recent results     CD4-pending    Anti ritux IgG-pending  Fungitell- pending  Steroid wean   Infectious  No recent results     CMV DNA pcr negative  BAL viral Cx negative  BAL fungal Cx 4+ growth candida albicans  BAL cax 4+ stenotrophomonas  Glucose  Recent Labs     03/20/24  0502 03/20/24  1333 03/21/24  0552   GLUC 117 186* 132               Joe Lazcano DO

## 2024-03-21 NOTE — RESPIRATORY THERAPY NOTE
RT Ventilator Management Note  Carla Hunt 59 y.o. female MRN: [id number]  Unit/Bed#: [facility] 12 Encounter: [id number]      Daily Screen         3/20/2024  0731 3/21/2024  0713          Patient safety screen outcome:: Passed Passed      Not Ready for Weaning due to:: -- --                Physical Exam:   Assessment Type: Assess only  Respiratory Pattern: Spontaneous, Assisted  Chest Assessment: Chest expansion symmetrical  Bilateral Breath Sounds: Coarse  Suction: Trach  O2 Device: TC      Resp Comments: (P) pt found on current settings. tolerating well.

## 2024-03-21 NOTE — RESPIRATORY THERAPY NOTE
RT Ventilator Management Note  Carla Hunt 59 y.o. female MRN: [id number]  Unit/Bed#: [facility] 12 Encounter: [id number]      Daily Screen         3/19/2024  0735 3/20/2024  0731          Patient safety screen outcome:: Passed Passed      Not Ready for Weaning due to:: -- --                Physical Exam:   Assessment Type: Assess only  General Appearance: Sleeping  Respiratory Pattern: Spontaneous, Assisted  Chest Assessment: Chest expansion symmetrical  Bilateral Breath Sounds: Coarse  Suction: Trach      Resp Comments: pt remained on spont settings through the night with no chagnes, tolerated well through the night       03/21/24 0430   Respiratory Assessment   Assessment Type Assess only   Respiratory Pattern Spontaneous;Assisted   Chest Assessment Chest expansion symmetrical   Bilateral Breath Sounds Coarse   Suction Trach   Resp Comments pt remained on spont settings through the night with no chagnes, tolerated well through the night   Vent Information   Vent ID [id number]   Vent type Summers G5   Summers Vent Mode SPONT   $ Pulse Oximetry Spot Check Charge Completed   SPONT Settings   FIO2 (%) 40 %   PEEP (cmH2O) 6 cmH2O   Pressure Support (cmH2O) 5 cmH20   Flow Trigger (LPM) 4 LPM   P-ramp (ms) 50 ms   ETS  (%) 25 %   Humidification Heater   Heater Temp 87.8 °F (31 °C)   SPONT Actuals   Resp Rate (BPM) 16 BPM   VT (mL) 396 mL   MV (Obs) 9.3   MAP (cmH2O) 7.7 cmH2O   Peak Pressure (cmH2O) 16 cmH2O   I:E Ratio (Obs) 1/4.3   RSBI (f/vt) 43 f/Vt   Heater Temperature (Obs) 87.8 °F (31 °C)   SPONT Alarms   High Peak Pressure (cmH20) 40 cmH2O   High Resp Rate (BPM) 40 BPM   High MV (L/min) 20 L/min   Low MV (L/min) 5 L/min   High Spont VTE (mL) 1000 mL   Low Spont VTE (mL) 250 mL   Apnea Time (S) 20 S   SPONT Apnea Settings   Resp Rate (BPM) 8 BPM   FIO2 (%) 40 %   %TI (%) 1 %   Apnea Time (S) 20 S   Maintenance   Alarm (pink) cable attached No   Resuscitation bag with peep valve at bedside Yes   Water bag changed  No   Circuit changed No   IHI Ventilator Associated Pneumonia Bundle   Head of Bed Elevated HOB 30   Surgical Airway Shiley Cuffed   Placement Date/Time: 03/12/24 1200   Tube Size: 8 mm  Type: Tracheostomy  Brand: Naveen  Style: Cuffed   Status Cuff Inflated;Secured   Equipment at bedside BVM;Wall Suction setup;Additional complete same size trach tube;Additional complete one size smaller trach tube;Obturator;Sterile saline;Additional inner cannula

## 2024-03-21 NOTE — PHYSICAL THERAPY NOTE
Physical Therapy Treatment Note    Patient's Name: Carla Hunt  : 24 1155   PT Last Visit   PT Visit Date 24   Note Type   Note Type Treatment   Pain Assessment   Pain Assessment Tool 0-10   Pain Score No Pain   Restrictions/Precautions   Weight Bearing Precautions Per Order No   Braces or Orthoses   (B resting hand splints, B Prevalon boots)   Other Precautions Airborne/isolation;Cognitive;Chair Alarm;Bed Alarm;Aspiration;Multiple lines;Telemetry;Fall Risk  (NPO, trach, wound vac, marie)   General   Response to Previous Treatment Patient unable to report, no changes reported from family or staff   Family/Caregiver Present Yes   Subjective   Subjective Agreeable to mobilize. Pt nonverbal but able to nod head yes / shake head no appropriately.   Bed Mobility   Supine to Sit 2  Maximal assistance   Additional items Assist x 2;Increased time required;Verbal cues;LE management;HOB elevated   Sit to Supine 2  Maximal assistance   Additional items Assist x 2;HOB elevated;Leg ;Verbal cues;LE management   Additional Comments Pt greeted in supine.   Transfers   Sit to Stand 2  Maximal assistance   Additional items Assist x 3;Increased time required;Verbal cues   Stand to Sit 2  Maximal assistance   Additional items Assist x 3;Increased time required;Verbal cues   Additional Comments B HHA + B knee block; 2x sit<>stand for approximately 5-7 sec; able to achieve 60% full stance   Balance   Static Sitting Poor   Dynamic Sitting Poor -   Static Standing Zero   Endurance Deficit   Endurance Deficit Yes   Endurance Deficit Description weakness, fatigue, worsening sitting balance w/ increased fatigue   Activity Tolerance   Activity Tolerance Patient limited by fatigue   Medical Staff Made Aware suzette Calixto   Nurse Made Aware yes - cleared + updated (requested adaptive call bell as pt unable to push nurse button on traditional call bell due to weakness)   Exercises   Knee  AROM Long Arc Quad Sitting;10 reps;AROM;Bilateral   UE Exercise 10 reps;AROM;Bilateral  (hand squeezes)   Assessment   Prognosis Fair   Problem List Decreased strength;Decreased range of motion;Decreased endurance;Impaired balance;Decreased mobility;Decreased cognition;Decreased coordination;Impaired judgement;Decreased safety awareness;Decreased skin integrity;Pain   Assessment Pt seen for PT treatment session w/ interventions consisting of bed mobility training, sitting balance instruction, + t/f training. Pt w/ global weakness w/ significant effort to participate in LAQ + hand squeezes. Pt able to participate in t/f training this session- MaxAx3 to achieve 60% stance (transfer goal added). Pt w/ worsening posterior lean in sitting w/ fatigue. Assisted back to bed and left in chair position. From a PT standpoint continue to recommend rehab upon d/c.   Goals   Patient Goals lay back down   Long Term Goal #2 Pt will perform transfers w/ ModAx2 to decrease caregiver burden by 4/2/24.   PT Treatment Day 2   Plan   Treatment/Interventions Functional transfer training;LE strengthening/ROM;Therapeutic exercise;Endurance training;Patient/family training;Equipment eval/education;Bed mobility;Spoke to nursing;OT   Progress Slow progress, decreased activity tolerance   PT Frequency 2-3x/wk   Discharge Recommendation   Rehab Resource Intensity Level, PT I (Maximum Resource Intensity)   AM-PAC Basic Mobility Inpatient   Turning in Flat Bed Without Bedrails 1   Lying on Back to Sitting on Edge of Flat Bed Without Bedrails 1   Moving Bed to Chair 1   Standing Up From Chair Using Arms 1   Walk in Room 1   Climb 3-5 Stairs With Railing 1   Basic Mobility Inpatient Raw Score 6   [facility] Highest Level Of Mobility   -HLM Goal 2: Bed activities/Dependent transfer   -HLM Achieved 3: Sit at edge of bed   Education   Education Provided Mobility training   Patient Reinforcement needed   End of Consult   Patient Position at End of  Consult All needs within reach  (pt in Kreg bed in chair position, bed inflated, all lines in tact, Prevalon boots donned, SCDs activated)       Laura Kern, PT, DPT

## 2024-03-21 NOTE — PLAN OF CARE
Problem: Prexisting or High Potential for Compromised Skin Integrity  Goal: Skin integrity is maintained or improved  Description: INTERVENTIONS:  - Identify patients at risk for skin breakdown  - Assess and monitor skin integrity  - Assess and monitor nutrition and hydration status  - Monitor labs   - Assess for incontinence   - Turn and reposition patient  - Assist with mobility/ambulation  - Relieve pressure over bony prominences  - Avoid friction and shearing  - Provide appropriate hygiene as needed including keeping skin clean and dry  - Evaluate need for skin moisturizer/barrier cream  - Collaborate with interdisciplinary team   - Patient/family teaching  - Consider wound care consult   Outcome: Progressing     Problem: INFECTION - ADULT  Goal: Absence or prevention of progression during hospitalization  Description: INTERVENTIONS:  - Assess and monitor for signs and symptoms of infection  - Monitor lab/diagnostic results  - Monitor all insertion sites, i.e. indwelling lines, tubes, and drains  - Monitor endotracheal if appropriate and nasal secretions for changes in amount and color  - [place] appropriate cooling/warming therapies per order  - Administer medications as ordered  - Instruct and encourage patient and family to use good hand hygiene technique  - Identify and instruct in appropriate isolation precautions for identified infection/condition  Outcome: Progressing     Problem: SAFETY ADULT  Goal: Patient will remain free of falls  Description: INTERVENTIONS:  - Educate patient/family on patient safety including physical limitations  - Instruct patient to call for assistance with activity   - Consult OT/PT to assist with strengthening/mobility   - Keep Call bell within reach  - Keep bed low and locked with side rails adjusted as appropriate  - Keep care items and personal belongings within reach  - Initiate and maintain comfort rounds  - Make Fall Risk Sign visible to staff  - Offer Toileting every  2 Hours, in advance of need  - Initiate/Maintain bed alarm  - Obtain necessary fall risk management equipment: non skid footwear  - Apply yellow socks and bracelet for high fall risk patients  - Consider moving patient to room near nurses station  Outcome: Progressing     Problem: RESPIRATORY - ADULT  Goal: Achieves optimal ventilation and oxygenation  Description: INTERVENTIONS:  - Assess for changes in respiratory status  - Assess for changes in mentation and behavior  - Position to facilitate oxygenation and minimize respiratory effort  - Oxygen administered by appropriate delivery if ordered  - Initiate smoking cessation education as indicated  - Encourage broncho-pulmonary hygiene including cough, deep breathe, Incentive Spirometry  - Assess the need for suctioning and aspirate as needed  - Assess and instruct to report SOB or any respiratory difficulty  - Respiratory Therapy support as indicated  Outcome: Progressing     Problem: SKIN/TISSUE INTEGRITY - ADULT  Goal: Skin Integrity remains intact(Skin Breakdown Prevention)  Description: Assess:  -Perform Flavio assessment every shift   -Clean and moisturize skin every day   -Inspect skin when repositioning, toileting, and assisting with ADLS  -Assess under medical devices such as ronny  every hour   -Assess extremities for adequate circulation and sensation     Bed Management:  -Have minimal linens on bed & keep smooth, unwrinkled  -Change linens as needed when moist or perspiring  -Avoid sitting or lying in one position for more than 2 hours while in bed  -Keep HOB at 45 degrees     Toileting:  -Offer bedside commode  -Assess for incontinence every hour  -Use incontinent care products after each incontinent episode such as moisture barrier cream     Activity:  -Mobilize patient 3 times a day  -Encourage activity and walks on unit  -Encourage or provide ROM exercises   -Turn and reposition patient every 2 Hours  -Use appropriate equipment to lift or move  patient in bed  -Instruct/ Assist with weight shifting every hour when out of bed in chair  -Consider limitation of chair time 2 hour intervals    Skin Care:  -Avoid use of baby powder, tape, friction and shearing, hot water or constrictive clothing  -Relieve pressure over bony prominences using allevyn   -Do not massage red bony areas    Next Steps:  -Teach patient strategies to minimize risks such as weight shifting    -Consider consults to  interdisciplinary teams such as PT  Outcome: Progressing  Goal: Incision(s), wounds(s) or drain site(s) healing without S/S of infection  Description: INTERVENTIONS  - Assess and document dressing, incision, wound bed, drain sites and surrounding tissue  - Provide patient and family education  - Consider nutrition services referral as needed  Outcome: Progressing     Problem: Potential for Falls  Goal: Patient will remain free of falls  Description: INTERVENTIONS:  - Educate patient/family on patient safety including physical limitations  - Instruct patient to call for assistance with activity   - Consult OT/PT to assist with strengthening/mobility   - Keep Call bell within reach  - Keep bed low and locked with side rails adjusted as appropriate  - Keep care items and personal belongings within reach  - Initiate and maintain comfort rounds  - Make Fall Risk Sign visible to staff  - Offer Toileting every 2 Hours, in advance of need  - Initiate/Maintain bed alarm  - Obtain necessary fall risk management equipment: non skid footwear  - Apply yellow socks and bracelet for high fall risk patients  - Consider moving patient to room near nurses station  Outcome: Progressing     Problem: Nutrition/Hydration-ADULT  Goal: Nutrient/Hydration intake appropriate for improving, restoring or maintaining nutritional needs  Description: Monitor and assess patient's nutrition/hydration status for malnutrition. Collaborate with interdisciplinary team and initiate plan and interventions as  ordered.  Monitor patient's weight and dietary intake as ordered or per policy. Utilize nutrition screening tool and intervene as necessary. Determine patient's food preferences and provide high-protein, high-caloric foods as appropriate.     INTERVENTIONS:  - Monitor oral intake, urinary output, labs, and treatment plans  - Assess nutrition and hydration status and recommend course of action  - Evaluate amount of meals eaten  - Assist patient with eating if necessary   - Allow adequate time for meals  - Recommend/ encourage appropriate diets, oral nutritional supplements, and vitamin/mineral supplements  - Order, calculate, and assess calorie counts as needed  - Recommend, monitor, and adjust tube feedings and TPN/PPN based on assessed needs  - Assess need for intravenous fluids  - Provide specific nutrition/hydration education as appropriate  - Include patient/family/caregiver in decisions related to nutrition  Outcome: Progressing     Problem: COPING  Goal: Pt/Family able to verbalize concerns and demonstrate effective coping strategies  Description: INTERVENTIONS:  - Assist patient/family to identify coping skills, available support systems and cultural and spiritual values  - Provide emotional support, including active listening and acknowledgement of concerns of patient and caregivers  - Reduce environmental stimuli, as able  - Provide patient education  - Assess for spiritual pain/suffering and initiate spiritual care, including notification of Pastoral Care or [place] based community as needed  - Assess effectiveness of coping strategies  Outcome: Progressing  Goal: Will report anxiety at manageable levels  Description: INTERVENTIONS:  - Administer medication as ordered  - Teach and encourage coping skills  - Provide emotional support  - Assess patient/family for anxiety and ability to cope  Outcome: Progressing     Problem: BEHAVIOR  Goal: Pt/Family maintain appropriate behavior and adhere to behavioral  management agreement, if implemented  Description: INTERVENTIONS:  - Assess the family dynamic   - Encourage verbalization of thoughts and concerns in a socially appropriate manner  - Assess patient/family's coping skills and non-compliant behavior (including use of illegal substances).  - Utilize positive, consistent limit setting strategies supporting safety of patient, staff and others  - Initiate consult with Case Management, Spiritual Care or other ancillary services as appropriate  - If a patient's/visitor's behavior jeopardizes the safety of the patient, staff, or others, refer to organization procedure.   - Notify Security of behavior or suspected illegal substances which indicate the need for search of the patient and/or belongings  - Encourage participation in the decision making process about a behavioral management agreement; implement if patient meets criteria  Outcome: Progressing     Problem: NEUROSENSORY - ADULT  Goal: Achieves stable or improved neurological status  Description: INTERVENTIONS  - Monitor and report changes in neurological status  - Monitor vital signs such as temperature, blood pressure, glucose, and any other labs ordered   - Initiate measures to prevent increased intracranial pressure  - Monitor for seizure activity and implement precautions if appropriate      Outcome: Progressing  Goal: Achieves maximal functionality and self care  Description: INTERVENTIONS  - Monitor swallowing and airway patency with patient fatigue and changes in neurological status  - Encourage and assist patient to increase activity and self care.   - Encourage visually impaired, hearing impaired and aphasic patients to use assistive/communication devices  Outcome: Progressing     Problem: CARDIOVASCULAR - ADULT  Goal: Maintains optimal cardiac output and hemodynamic stability  Description: INTERVENTIONS:  - Monitor I/O, vital signs and rhythm  - Monitor for S/S and trends of decreased cardiac output  -  Administer and titrate ordered vasoactive medications to optimize hemodynamic stability  - Assess quality of pulses, skin color and temperature  - Assess for signs of decreased coronary artery perfusion  - Instruct patient to report change in severity of symptoms  Outcome: Progressing  Goal: Absence of cardiac dysrhythmias or at baseline rhythm  Description: INTERVENTIONS:  - Continuous cardiac monitoring, vital signs, obtain 12 lead EKG if ordered  - Administer antiarrhythmic and heart rate control medications as ordered  - Monitor electrolytes and administer replacement therapy as ordered  Outcome: Progressing     Problem: GASTROINTESTINAL - ADULT  Goal: Minimal or absence of nausea and/or vomiting  Description: INTERVENTIONS:  - Administer IV fluids if ordered to ensure adequate hydration  - Maintain NPO status until nausea and vomiting are resolved  - Nasogastric tube if ordered  - Administer ordered antiemetic medications as needed  - Provide nonpharmacologic comfort measures as appropriate  - Advance diet as tolerated, if ordered  - Consider nutrition services referral to assist patient with adequate nutrition and appropriate food choices  Outcome: Progressing  Goal: Maintains or returns to baseline bowel function  Description: INTERVENTIONS:  - Assess bowel function  - Encourage oral fluids to ensure adequate hydration  - Administer IV fluids if ordered to ensure adequate hydration  - Administer ordered medications as needed  - Encourage mobilization and activity  - Consider nutritional services referral to assist patient with adequate nutrition and appropriate food choices  Outcome: Progressing  Goal: Maintains adequate nutritional intake  Description: INTERVENTIONS:  - Monitor percentage of each meal consumed  - Identify factors contributing to decreased intake, treat as appropriate  - Assist with meals as needed  - Monitor I&O, weight, and lab values if indicated  - Obtain nutrition services referral as  needed  Outcome: Progressing  Goal: Establish and maintain optimal ostomy function  Description: INTERVENTIONS:  - Assess bowel function  - Encourage oral fluids to ensure adequate hydration  - Administer IV fluids if ordered to ensure adequate hydration   - Administer ordered medications as needed  - Encourage mobilization and activity  - Nutrition services referral to assist patient with appropriate food choices  - Assess stoma site  - Consider wound care consult   Outcome: Progressing  Goal: Oral mucous membranes remain intact  Description: INTERVENTIONS  - Assess oral mucosa and hygiene practices  - Implement preventative oral hygiene regimen  - Implement oral medicated treatments as ordered  - Initiate Nutrition services referral as needed  Outcome: Progressing     Problem: GENITOURINARY - ADULT  Goal: Maintains or returns to baseline urinary function  Description: INTERVENTIONS:  - Assess urinary function  - Encourage oral fluids to ensure adequate hydration if ordered  - Administer IV fluids as ordered to ensure adequate hydration  - Administer ordered medications as needed  - Offer frequent toileting  - Follow urinary retention protocol if ordered  Outcome: Progressing  Goal: Urinary catheter remains patent  Description: INTERVENTIONS:  - Assess patency of urinary catheter  - If patient has a chronic marie, consider changing catheter if non-functioning  - Follow guidelines for intermittent irrigation of non-functioning urinary catheter  Outcome: Progressing     Problem: METABOLIC, FLUID AND ELECTROLYTES - ADULT  Goal: Electrolytes maintained within normal limits  Description: INTERVENTIONS:  - Monitor labs and assess patient for signs and symptoms of electrolyte imbalances  - Administer electrolyte replacement as ordered  - Monitor response to electrolyte replacements, including repeat lab results as appropriate  - Instruct patient on fluid and nutrition as appropriate  Outcome: Progressing  Goal: Fluid  balance maintained  Description: INTERVENTIONS:  - Monitor labs   - Monitor I/O and WT  - Instruct patient on fluid and nutrition as appropriate  - Assess for signs & symptoms of volume excess or deficit  Outcome: Progressing  Goal: Glucose maintained within target range  Description: INTERVENTIONS:  - Monitor Blood Glucose as ordered  - Assess for signs and symptoms of hyperglycemia and hypoglycemia  - Administer ordered medications to maintain glucose within target range  - Assess nutritional intake and initiate nutrition service referral as needed  Outcome: Progressing     Problem: HEMATOLOGIC - ADULT  Goal: Maintains hematologic stability  Description: INTERVENTIONS  - Assess for signs and symptoms of bleeding or hemorrhage  - Monitor labs  - Administer supportive blood products/factors as ordered and appropriate  Outcome: Progressing     Problem: MUSCULOSKELETAL - ADULT  Goal: Maintain or return mobility to safest level of function  Description: INTERVENTIONS:  - Assess patient's ability to carry out ADLs; assess patient's baseline for ADL function and identify physical deficits which impact ability to perform ADLs (bathing, care of mouth/teeth, toileting, grooming, dressing, etc.)  - Assess/evaluate cause of self-care deficits   - Assess range of motion  - Assess patient's mobility  - Assess patient's need for assistive devices and provide as appropriate  - Encourage maximum independence but intervene and supervise when necessary  - Involve family in performance of ADLs  - Assess for home care needs following discharge   - Consider OT consult to assist with ADL evaluation and planning for discharge  - Provide patient education as appropriate  Outcome: Progressing     Problem: SAFETY,RESTRAINT: NV/NON-SELF DESTRUCTIVE BEHAVIOR  Goal: Remains free of harm/injury (restraint for non violent/non self-detsructive behavior)  Description: INTERVENTIONS:  - Instruct patient/family regarding restraint use   - Assess and  monitor physiologic and psychological status   - Provide interventions and comfort measures to meet assessed patient needs   - Identify and implement measures to help patient regain control  - Assess readiness for release of restraint   Outcome: Completed  Goal: Returns to optimal restraint-free functioning  Description: INTERVENTIONS:  - Assess the patient's behavior and symptoms that indicate continued need for restraint  - Identify and implement measures to help patient regain control  - Assess readiness for release of restraint   Outcome: Completed

## 2024-03-21 NOTE — PROGRESS NOTES
[facility]  Progress Note  Name: Carla Hunt I  MRN: [id number]  Unit/Bed#: MICU 12 I Date of Admission: 1/10/2024   Date of Service: 3/21/2024 I Hospital Day: 71    Assessment/Plan   Goals of care, counseling/discussion  Assessment & Plan  Code Status: full - Level 1  Ongoing medical optimization without limits at this time. Hopeful for slow recovery. Min does express understanding of periodic setbacks but is hoping Carla can build some momentum to become medically stable for d/c to rehab.     Palliative care patient  Assessment & Plan  Time spent providing supportive listening. Reiterated role of palliative medicine to family friend (Farhan) who has been a support to Carla and Min for quite some time.   Patient to remain full code, Min reiterated this today.  Decisional apparatus:  Patient is not competent on my exam today.  If competence is lost, patient's substitute decision maker would default to spouse Min [place].  Advance Directive / Living Will / POLST:  None on file, unable to complete    Teto marginal zone B-cell lymphoma (HCC)  Assessment & Plan  Previously on maintenance Rituxan therapy. Last treatment in December 2023    Anxiety state  Assessment & Plan  Continue Effexor per primary  Patient denies anxiety during time of encounter.    * Acute respiratory failure with hypoxia (HCC)  Assessment & Plan  Patient was re-intubated 3/11 with subsequent bedside trach on 3/12.   Very careful steroid wean  per critical care team.   On trach collar during time of encounter.         Interval history:       Patient received several units of blood products yesterday during code crimson called for bleeding ostomy. Hemostasis achieved at bedside with stitches placed by general surgery. Hgb stable today. Patient is alert, following commands, and on trach collar during time of encounter.    Sat down in conference room with spouse, Min, and a close  "family friend, Farhan. Took this opportunity to discuss \"big picture\" expectations such as improvement today and still a guarded prognosis with months of rehab/recovery best case scenario. Min shares simultaneous worry and hopefulness. He feels that the current care plan is in her best interest. He finds comfort and support in Farhan's presence and prayer community.    MEDICATIONS / ALLERGIES:     all current active meds have been reviewed    No Known Allergies    OBJECTIVE:    Physical Exam  Physical Exam    Lab Results:   Results from last 7 days   Lab Units 03/21/24  1332 03/21/24  0850 03/21/24  0552 03/20/24  1606 03/20/24  1048 03/20/24  0457 03/19/24  0543 03/18/24  0533 03/18/24  0429   WBC Thousand/uL 5.30  --  4.87  --  7.09   < > 9.92  --  8.39  8.39   HEMOGLOBIN g/dL 9.4* 10.0* 9.6*   < > 11.6   < > 9.5*   < > 5.6*  5.6*   I STAT HEMOGLOBIN   --   --   --   --   --    < >  --   --   --    HEMATOCRIT % 27.9* 29.9* 28.5*   < > 34.5*   < > 28.1*   < > 17.6*  17.6*   HEMATOCRIT, ISTAT   --   --   --   --   --    < >  --   --   --    PLATELETS Thousands/uL 82*  --  53*  --  72*   < > 86*  --  89*  89*   MONO PCT %  --   --   --   --   --   --  1*  --  0*   EOS PCT %  --   --   --   --   --   --  0  --  0    < > = values in this interval not displayed.     Results from last 7 days   Lab Units 03/21/24  1332 03/21/24  0552 03/20/24  1333 03/20/24  0831 03/20/24  0502 03/19/24  0543   POTASSIUM mmol/L 4.4 4.3 4.8  --    < > 4.7   CHLORIDE mmol/L 111* 110* 112*  --    < > 107   CO2 mmol/L 19* 21 15*  --    < > 17*   CO2, I-STAT mmol/L  --   --   --  15*  --   --    BUN mg/dL 77* 79* 87*  --    < > 85*   CREATININE mg/dL 1.38* 1.41* 1.54*  --    < > 1.79*   CALCIUM mg/dL 8.3* 8.3* 8.7  --    < > 8.4   ALK PHOS U/L  --   --   --   --   --  141*   ALT U/L  --   --   --   --   --  64*   AST U/L  --   --   --   --   --  29   GLUCOSE, ISTAT mg/dl  --   --   --  118  --   --     < > = values in this interval not " displayed.       Imaging Studies: reviewed pertinent studies   EKG, Pathology, and Other Studies: reviewed pertinent studies    Counseling / Coordination of Care    Total floor / unit time spent today 45 minutes. Greater than 50% of total time was spent with the patient and / or family counseling and / or coordination of care. A description of the counseling / coordination of care: time spent assessing patient, providing support to spouse and family friend, coordinating  care with RN.    This note was not shared with the patient due to privacy exception: note includes other individuals

## 2024-03-21 NOTE — PLAN OF CARE
Problem: PHYSICAL THERAPY ADULT  Goal: Performs mobility at highest level of function for planned discharge setting.  See evaluation for individualized goals.  Description:    Equipment Recommended:  (none)       See flowsheet documentation for full assessment, interventions and recommendations.  Outcome: Progressing  Note: Prognosis: Fair  Problem List: Decreased strength, Decreased range of motion, Decreased endurance, Impaired balance, Decreased mobility, Decreased cognition, Decreased coordination, Impaired judgement, Decreased safety awareness, Decreased skin integrity, Pain  Assessment: Pt seen for PT treatment session w/ interventions consisting of bed mobility training, sitting balance instruction, + t/f training. Pt w/ global weakness w/ significant effort to participate in LAQ + hand squeezes. Pt able to participate in t/f training this session- MaxAx3 to achieve 60% stance (transfer goal added). Pt w/ worsening posterior lean in sitting w/ fatigue. Assisted back to bed and left in chair position. From a PT standpoint continue to recommend rehab upon d/c.  Barriers to Discharge: None     Rehab Resource Intensity Level, PT: I (Maximum Resource Intensity)    See flowsheet documentation for full assessment.

## 2024-03-21 NOTE — PROGRESS NOTES
Progress Note - Infectious Disease   Carla Hunt 59 y.o. female MRN: [id number]  [facility]: [id number]      Impression/Plan:    1. Acute hypoxic respiratory failure, likely multifactorial, with both COVID and bacterial pneumonia contributing.  Also consider persistent inflammation, fibrosis as seems quite steroid responsive in past.  Most recently extubated 3/1.  Patient with worsening respiratory status requiring intubation again 3/11.  Suspect ongoing hypoxia related to persistent COVID-pneumonia, superimposed bacterial infection, inflammatory component/lung fibrosis related to COVID, now with profound deconditioning and muscle weakness.  Status post bronchoscopy and trach 3/12 with excessive secretions seen from the lower lobes bilaterally.  BAL culture from 3/11 shows heavy growth of Stenotrophomonas maltophilia.  PJP DFA negative. While the patient has grown this before and she certainly may be colonized, given worsening CT findings, intubation and prolonged steroids would treat as a true pathogen.  Status post 10 days of vancomycin and cefepime.  Started on Bactrim 3/13, switched to minocycline 3/18.  Patient completed 14-day course of remdesivir/Paxlovid 3/15.  CRP improving. Status post repeat bronchoscopy 3/17 for airway clearance with continued thick secretions.  Low concern for uncontrolled infection contributing to clinical picture. Respiratory status improving, patient now on trach collar. No fevers.   -Continue p.o. minocycline 200 mg twice daily via NG tube.  Tube feeds should be held for 1 hour prior to and 1 hour after minocycline administration. Recommend a 14 day course through 3/27/24  -Steroid dosing per critical care, tolerating weaning   -No indication to treat Candida in respiratory culture, this is respiratory colonization  -Trend CRP  -If clinically deteriorates with concern for progressive sepsis would add meropenem 1g IV Q8 to the Bactrim     2. SIRS versus  sepsis.  Fluctuating fever, WBC.  Fevers may be multifactorial due to COVID (still positive antigen and PCR) versus inflammation (seem to correlate to steroid dosing).  Also consider developing bacterial infection.  Recent blood cultures 2/26 negative.  CT C/A/P 3/10 shows stable groundglass and nodular opacities, inflammation around stoma. Now with dehiscence of her abdominal wound following staple removal, status post wound VAC placement 3/13. Patient afebrile, without leukocytosis, weaning on trach collar  -antibiotics as above  -Follow temperatures closely  -Recheck WBC in AM to monitor infection  -Supportive care as per the primary service     3. Recurrent bacterial pneumonia.  The patient has completed multiple treatment courses over the hospitalization.  Most recent BAL culture with Pseudomonas and stenotrophomonas.  Unclear if pathogens or colonizers.  Status post 10 days levofloxacin.  CT C/A/P showed evolving changes likely all due to sequelae of COVID, infections.  Noted to have worsening hypoxia and purulent secretions on bronchoscopy 3/11.  BAL culture with heavy growth of Stenotrophomonas maltophilia, Candida              -Antibiotics as above              -Wean O2 as able     4. Severe COVID, present on admission.  Patient was treated with a 10-day course of remdesivir, dexamethasone and was given 1 dose of Tocilizumab on admission.  COVID antigen was negative prior to coming off isolation.  Had positive COVID PCR from BAL 2/16, status post another 5-day course of remdesivir.  Patient has remained on high-dose systemic corticosteroid throughout her hospitalization which were recently intensified 2/26 for fevers.  Patient having persistent fevers, hypoxia.  COVID antigen positive and PCR is also positive 3/3 and Ct 26.8, consistent with high viral replication.  Repeat PCR positive with Ct now closer to 30. CRP overall decreasing with slow steroid wean.  Status post 14-day course of remdesivir/Paxlovid  3/15/2024  -Steroid wean per ICU  -No additional antivirals indicated  -Will repeat rapid COVID antigen 3/22 to see if we can remove isolation     5. 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  Patient is status post exploratory laparotomy with ileocecectomy and end ileostomy creation .   End ileostomy is with ongoing ischemic changes which is likely contributing to the imaging findings and ongoing SIRS response.  Now with wound dehiscence status post staple removal, status post wound VAC placement 3/13, removed but replaced today due to bleeding  -Serial exams  -Surgery follow-up   -no current signs of infection, need for antibiotics      B-cell lymphoma, on maintenance rituxan, which is currently postponed.  Rituximab is a risk factor for persistent COVID infection.    7.  ELIESER.  More likely due to hypovolemia since creatinine was increasing prior to starting Bactrim.   -Monitor creatinine closely   -Dose adjust antibiotics as needed    8.  Anemia, thrombocytopenia, lymphopenia.  Patient has had persistent lymphopenia throughout this admission, likely due to rituximab, viral infection, high-dose steroids.  Now with worsening anemia and thrombocytopenia.  Bactrim discontinued 3/18. CMV PCR negative. Hgb now stable after transfusion   -Steroid wean per primary   -Transfusion support per primary   -Consider hematology consult    Above management plan to continue p.o. minocycline discussed with the critical care team who is in agreement. ID will follow.    Antibiotics:  Day 4 minocycline (day 9 total)    Subjective:  The patient was transitioned to trach collar today. She continues to have thick secretions. She is more awake, opens her eyes and did follow commands. No fever or chills.    Objective:  Vitals:  Temp:  [97.9 °F (36.6 °C)-100 °F (37.8 °C)] 98.1 °F (36.7 °C)  HR:  [116-138] 126  Resp:  [11-33] 21  BP: (128)/(68) 128/68  SpO2:  [89 %-99 %] 89 %  Temp (24hrs), Av.6 °F (37 °C), Min:97.9 °F  (36.6 °C), Max:100 °F (37.8 °C)  Current: Temperature: 98.1 °F (36.7 °C)    Physical Exam:   General Appearance:  Ill-appearing, lethargic but now awakens to voice   Neck: Trach in place. Bloody secretions around the trach   Lungs:   Rhonchi bilaterally   Heart:  RRR; no murmur, rub or gallop   Abdomen:   Midline wound dehiscence, guaze in place without active bleeding   Extremities: No edema   Skin: No new rashes or lesions.        Labs:   All pertinent labs and imaging studies were personally reviewed  Results from last 7 days   Lab Units 03/21/24  0850 03/21/24  0552 03/21/24  0034 03/20/24  1606 03/20/24  1048 03/20/24  0827 03/20/24  0457   WBC Thousand/uL  --  4.87  --   --  7.09  --  7.88   HEMOGLOBIN g/dL 10.0* 9.6* 10.0*   < > 11.6   < > 9.0*   I STAT HEMOGLOBIN   --   --   --   --   --    < >  --    PLATELETS Thousands/uL  --  53*  --   --  72*  --  85*    < > = values in this interval not displayed.     Results from last 7 days   Lab Units 03/21/24  0552 03/20/24  1333 03/20/24  0831 03/20/24  0502 03/19/24  0543   SODIUM mmol/L 142 137  --  137 138   POTASSIUM mmol/L 4.3 4.8  --  4.9 4.7   CHLORIDE mmol/L 110* 112*  --  108 107   CO2 mmol/L 21 15*  --  18* 17*   CO2, I-STAT mmol/L  --   --  15*  --   --    BUN mg/dL 79* 87*  --  88* 85*   CREATININE mg/dL 1.41* 1.54*  --  1.63* 1.79*   EGFR ml/min/1.73sq m 41 36  --  34 30   GLUCOSE, ISTAT mg/dl  --   --  118  --   --    CALCIUM mg/dL 8.3* 8.7  --  8.7 8.4   AST U/L  --   --   --   --  29   ALT U/L  --   --   --   --  64*   ALK PHOS U/L  --   --   --   --  141*           Results from last 7 days   Lab Units 03/19/24  0543 03/18/24  0429 03/17/24  0451 03/16/24  0611 03/15/24  0603   CRP mg/L 41.7* 38.2* 78.9* 13.4* 18.6*           Results from last 7 days   Lab Units 03/20/24  0457   D-DIMER QUANTITATIVE ug/ml FEU 1.92*           Imaging:  CXR personally reviewed and shows mild improvement in multifocal bronchopneumonia

## 2024-03-21 NOTE — SOCIAL WORK
"The Provider, NEGRITA Ramirez and the Palliative SW met with the pt  and his neighbor, he describes as \"like a father\" to both he and the pt.      The pt was able to follow commands from the provider and was doing better today than yesterday.    There was a discussion with the pt  and family friend/neighbor.  They inquired about the pt overall prognosis.  They were informed we would need to take everything day by day.  They are aware the pt remains in the ICU and she requires critical care.  They acknowledge the pt will have a new baseline and is not likely to return to her normal lifestyle prior to this admission.  They were told she will need several months of rehab to fully recover.      The pt family and friend are aware there is a possibility the pt may not survive her hospitalization.  It was discussed this would be the worse case scenario.  There was mention the pt  has been in the hospital at the pt bedside and was not able to work.  The SW will follow up with the pt  regarding any information he may need to provide to his employer during the next encounter as there was a lengthy discussion regarding the pt care and treatment.    Both were thankful for the discussion.  The palliative informed him we would continue to follow and offer support.    I have spent 60 minutes with Family today in which greater than 50% of this time was spent in counseling/coordination of care     Palliative  will follow-up as requested by patient, family, and primary team.  Please contact with any specific requests    "

## 2024-03-21 NOTE — UTILIZATION REVIEW
"Continued Stay Review    Date: 03/21                          Current Patient Class: IP  Current Level of Care: Level 2 stepdown/HOT    HPI:59 y.o. female initially admitted on 01/10     Assessment/Plan: No acute ovn events. CXR with improvement, continue with weaning steroids, will be at 30mg BID today. On trach collar today and tolerating well on 12L NC, wean TC as able. Check CRP. Bleeding at ostomy site resolved, rpt H&H stable. Given 1 u plts. 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 continue minocycline until 3/26. Aggressive PT/OT today. Consult speech to assess swallow function and valve. Restart tube feeds. Remove arterial line. F/up fungitell, CMV DNA PCR. F/up CD4 and blood flow cytometry. F/up anti-rituximab IgG.Cont IV PPI. Cont to mon H&H. Monitor stool output and NG tube output for developing signs of bleeding. Cont Insulin gtt.   ID, GI, gen surg and nephrology ff.       Vital Signs: /68 (BP Location: Right arm)   Pulse (!) 118   Temp 98.4 °F (36.9 °C) (Axillary)   Resp 14   Ht 5' 8\" (1.727 m)   Wt 76.6 kg (168 lb 14 oz)   SpO2 97%   BMI 25.68 kg/m²       Pertinent Labs/Diagnostic Results:       Results from last 7 days   Lab Units 03/21/24  1332 03/21/24  0850 03/21/24  0552 03/21/24  0034 03/20/24  1606 03/20/24  1048 03/20/24  0457 03/19/24  0543 03/18/24  0533 03/18/24  0429   WBC Thousand/uL 5.30  --  4.87  --   --  7.09   < > 9.92  --  8.39  8.39   HEMOGLOBIN g/dL 9.4* 10.0* 9.6* 10.0* 10.4* 11.6   < > 9.5*   < > 5.6*  5.6*   I STAT HEMOGLOBIN   --   --   --   --   --   --    < >  --   --   --    HEMATOCRIT % 27.9* 29.9* 28.5* 30.0* 30.8* 34.5*   < > 28.1*   < > 17.6*  17.6*   HEMATOCRIT, ISTAT   --   --   --   --   --   --    < >  --   --   --    PLATELETS Thousands/uL 82*  --  53*  --   --  72*   < > 86*  --  89*  89*   BANDS PCT %  --   --   --   --   --   --   --  7  --  5    < > = values in this interval not displayed.     Results from last 7 days   Lab Units " 03/18/24  0429   RETIC CT ABS  87,700   RETIC CT PCT % 4.90*     Results from last 7 days   Lab Units 03/21/24  1332 03/21/24  0557 03/21/24  0552 03/20/24  1333 03/20/24  0831 03/20/24  0502 03/20/24  0457 03/19/24  0543 03/18/24  0429 03/17/24  0451 03/16/24  0611 03/15/24  1700   SODIUM mmol/L 141  --  142 137  --  137  --  138 135 137   < >  --    POTASSIUM mmol/L 4.4  --  4.3 4.8  --  4.9  --  4.7 4.1 4.3   < >  --    CHLORIDE mmol/L 111*  --  110* 112*  --  108  --  107 107 105   < >  --    CO2 mmol/L 19*  --  21 15*  --  18*  --  17* 18* 19*   < >  --    CO2, I-STAT mmol/L  --   --   --   --  15*  --   --   --   --   --   --   --    ANION GAP mmol/L 11  --  11 10  --  11  --  14* 10 13   < >  --    BUN mg/dL 77*  --  79* 87*  --  88*  --  85* 81* 71*   < >  --    CREATININE mg/dL 1.38*  --  1.41* 1.54*  --  1.63*  --  1.79* 1.89* 1.65*   < >  --    EGFR ml/min/1.73sq m 42  --  41 36  --  34  --  30 28 33   < >  --    CALCIUM mg/dL 8.3*  --  8.3* 8.7  --  8.7  --  8.4 8.0* 8.2*   < >  --    CALCIUM, IONIZED mmol/L  --   --   --   --   --   --   --   --  1.20  --   --  1.14   CALCIUM, IONIZED, ISTAT mmol/L  --   --   --   --  1.40*  --   --   --   --   --   --   --    MAGNESIUM mg/dL  --  2.2  --   --   --   --  2.4 2.3 2.3 2.2   < >  --    PHOSPHORUS mg/dL  --  5.4*  --   --   --   --  5.2* 4.0 4.0 3.6   < >  --     < > = values in this interval not displayed.     Results from last 7 days   Lab Units 03/19/24  0543 03/18/24  1214   AST U/L 29  --    ALT U/L 64*  --    ALK PHOS U/L 141*  --    TOTAL PROTEIN g/dL 4.6*  --    ALBUMIN g/dL 2.5*  --    TOTAL BILIRUBIN mg/dL 0.47  --    AMMONIA umol/L  --  51     Results from last 7 days   Lab Units 03/21/24  1411 03/21/24  1229 03/21/24  0958 03/21/24  0849 03/21/24  0551 03/21/24  0400 03/21/24  0209 03/21/24  0017 03/20/24  2208 03/20/24  2017 03/20/24  1801 03/20/24  1612   POC GLUCOSE mg/dl 225* 241* 178* 163* 139 150* 173* 178* 150* 109 99 137     Results from  "last 7 days   Lab Units 03/21/24  1332 03/21/24  0552 03/20/24  1333 03/20/24  0502 03/19/24  0543 03/18/24  0429 03/17/24  0451 03/16/24  0611 03/15/24  0603   GLUCOSE RANDOM mg/dL 247* 132 186* 117 207* 139 143* 110 133             No results found for: \"BETA-HYDROXYBUTYRATE\"   Results from last 7 days   Lab Units 03/21/24  1512 03/21/24  0018 03/20/24  1606   PH ART  7.374 7.339* 7.278*   PCO2 ART mm Hg 32.6* 33.6* 33.8*   PO2 ART mm Hg 81.7 91.4 99.4   HCO3 ART mmol/L 18.6* 17.7* 15.5*   BASE EXC ART mmol/L -5.8 -7.2 -10.3   O2 CONTENT ART mL/dL 14.4* 14.5* 15.4*   O2 HGB, ARTERIAL % 95.1 95.5 96.2   ABG SOURCE  Line, Arterial Line, Arterial Line, Arterial     Results from last 7 days   Lab Units 03/19/24  1237   PH NICA  7.242*   PCO2 NICA mm Hg 40.2*   PO2 NICA mm Hg 51.0*   HCO3 NICA mmol/L 16.9*   BASE EXC NICA mmol/L -9.8   O2 CONTENT NICA ml/dL 11.5   O2 HGB, VENOUS % 81.7*     Results from last 7 days   Lab Units 03/20/24  0831   PH, NICA I-STAT  7.323   PCO2, NICA ISTAT mm HG 27.7*   PO2, NICA ISTAT mm HG 57.0*   HCO3, NICA ISTAT mmol/L 14.4*   I STAT BASE EXC mmol/L -11*   I STAT O2 SAT % 88*     Results from last 7 days   Lab Units 03/19/24  1721   CK TOTAL U/L 47         Results from last 7 days   Lab Units 03/20/24  0457   D-DIMER QUANTITATIVE ug/ml FEU 1.92*     Results from last 7 days   Lab Units 03/21/24  1332 03/20/24  0457   PROTIME seconds 17.8* 16.2*   INR  1.49* 1.32*   PTT seconds 40* 34             Results from last 7 days   Lab Units 03/21/24  1332 03/20/24  0840 03/17/24  1604   LACTIC ACID mmol/L 0.8 1.2 1.6                         Results from last 7 days   Lab Units 03/21/24  1332 03/21/24  1328 03/21/24  1239 03/20/24  0840 03/20/24  0837 03/20/24  0834 03/20/24  0834 03/20/24  0830 03/19/24  0555   UNIT PRODUCT CODE  [id number]  A3990Z94 K7398X70  K0999H02 R5301A21 Q8454G03 L4115V31  K9458Y89  Z1771E28  D3182U68 M1641G62 D4727M96  D5139X08  B0576Y97  Q1784E67 X5064M85  K9110M92  " U9809B83  X8594T50  P4305R43  H5444M16 Y1897K02  P9512W46   UNIT NUMBER  [id number]  [id number]  [id number]  [id number]  [id number]  [id number]  [id number]  [id number]  [id number]  [id number]  [id number]  [id number]  [id number]  [id number]  [id number]   UNITABO  A  A A  A A AB O  O  O  O A AB  AB  A  A O  O  O  O  O  O A  A   UNITRH  NEG  NEG NEG  NEG POS POS POS  POS  POS  POS POS POS  POS  POS  POS POS  POS  POS  POS  POS  POS NEG  NEG   CROSSMATCH  Compatible  Compatible Compatible  Compatible  --   --   --   --   --  Compatible  Compatible Compatible  Compatible   UNIT DISPENSE STATUS  Crossmatched  Issued Crossmatched  Crossmatched Issued Return to Inv Return to Inv  Return to Inv  Return to Inv  Return to Inv Return to Inv Return to Inv  Return to Inv  Return to Inv  Return to Inv Return to Inv  Presumed Trans  Return to Inv  Return to Inv  Presumed Trans  Return to Inv Presumed Trans  Presumed Trans   UNIT PRODUCT VOL mL 350  350 350  350 248 246 300  300  350  350 300 191  203  280  250 350  350  300  350  350  350 350  350             Results from last 7 days   Lab Units 03/21/24  1232 03/19/24  0543 03/18/24  0429 03/17/24  0451 03/16/24  0611   CRP mg/L 226.8* 41.7* 38.2* 78.9* 13.4*             Results from last 7 days   Lab Units 03/17/24  1753   SODIUM UR  40               Medications:   Scheduled Medications:  chlorhexidine, 15 mL, Mouth/Throat, Q12H SARTHAK  enoxaparin, 40 mg, Subcutaneous, Q24H SARTHAK  ipratropium, 0.5 mg, Nebulization, TID  lamoTRIgine, 150 mg, Oral, BID  levalbuterol, 1.25 mg, Nebulization, TID  methylPREDNISolone sodium succinate, 30 mg, Intravenous, Q8H SARTHAK   Followed by  [START ON 3/22/2024]  methylPREDNISolone sodium succinate, 30 mg, Intravenous, Q12H SARTHAK   Followed by  [START ON 3/24/2024] methylPREDNISolone sodium succinate, 20 mg, Intravenous, Q12H SARTHAK   Followed by  [START ON 3/26/2024] methylPREDNISolone sodium succinate, 10 mg, Intravenous, Q12H SARTHAK   Followed by  [START ON 3/28/2024] methylPREDNISolone sodium succinate, 10 mg, Intravenous, Daily  minocycline, 200 mg, Per NG Tube, BID  modafinil, 100 mg, Per NG Tube, Daily  nystatin, , Topical, BID  pantoprazole, 40 mg, Intravenous, Q12H SARTHAK  polyethylene glycol, 17 g, Per NG Tube, Daily  sodium chloride, 2 spray, Each Nare, Q4H  sodium chloride, 4 mL, Nebulization, TID  topiramate, 50 mg, Per PEG Tube, BID  venlafaxine, 12.5 mg, Oral, TID With Meals      Continuous IV Infusions:  insulin regular (HumuLIN R,NovoLIN R) 1 Units/mL in sodium chloride 0.9 % 100 mL infusion, 0.3-21 Units/hr, Intravenous, Titrated      PRN Meds:  acetaminophen, 650 mg, Oral, Q6H PRN  fentaNYL, 50 mcg, Intravenous, Q1H PRN  ondansetron, 4 mg, Intravenous, Q6H PRN  sodium chloride, 1 Application, Nasal, Q1H PRN        Discharge Plan: D    Network Utilization Review Department  ATTENTION: Please call with any questions or concerns to [phone] and carefully listen to the prompts so that you are directed to the right person. All voicemails are confidential.   For Discharge needs, contact Care Management DC Support Team at [phone] opt. [phone]  Send all requests for admission clinical reviews, approved or denied determinations and any other requests to dedicated fax number below belonging to the campus where the patient is receiving treatment. List of dedicated fax numbers for the Facilities:  FACILITY NAME UR FAX NUMBER   ADMISSION DENIALS (Administrative/Medical Necessity) [phone]   DISCHARGE SUPPORT TEAM (NETWORK) [phone]   PARENT CHILD HEALTH (Maternity/NICU/Pediatrics) [phone]   [facility] [phone]   [facility]  [facility] [phone]   [facility] [phone]   [facility] [phone]   [facility] [phone]   [facility] [phone]   [facility] [phone]   [facility] [phone]   [facility] [phone]   [facility] [phone]   [facility] [phone]   [facility] [phone]

## 2024-03-21 NOTE — PROGRESS NOTES
Progress Note- Carla Hunt 59 y.o. female MRN: [id number]    Unit/Bed#: [facility] 12 Encounter: [id number]      Assessment and Plan:    59-year-old female with history including but not limited to B-cell lymphoma, seizure disorder s/p partial left upper lobe resection, CKD, DM with current prolonged and complicated hospital course after being admitted for encephalopathy, hypoxic respiratory failure in setting of COVID infection.  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  Has also had intermittent bleeding from the stoma site warranting code [employer] team activation on 3/20/2024, hemostasis achieved by surgical team by addressing small pulsatile bleeding at the 3 o clock aspect of the skin edge.    Since then she has had some intermittent pink/red discharge in the wound VAC but no overt signs of bleeding.  On physical exam she appears comfortable, does provide gestures response to my questions.  On physical exam she is chronically sick appearing female with tracheostomy with ongoing secretions, in situ NG tube with bilious output.  Stoma site under wound VAC.  Has NICOLASA drain with bilious output.  Tube feeds have been held so far.    On review of blood work hemoglobin 10 from 11.6 yesterday.  She received multiple blood products during code [employer]son team activation yesterday.  Platelets 53, BUN 79, creatinine 1.41.    1.  Acute on chronic anemia  2.  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  Stoma site bleeding    Successful hemostasis achieved by surgical team by treating bleeding pulsatile vessel at the stoma site.  Since that hemoglobin has remained relatively stable, has not required any additional blood transfusions.      GI team has been following for concern of upper GI bleeding however at this time suspicion for active upper GI bleeding is extremely  low given nonbloody NG tube output, relatively stable hemoglobin, downtrending BUN/creatinine ratio.  Output through the stoma site appears dark bilious as well.    Given prolonged ICU course and critical illness there is always a concern for critical illness gastropathy for which we will recommend to continue IV twice daily.  Avoid NSAIDs. Optimize coagulopathy.     1. Continue IV PPI BID  2. No objections to re-initiating tube feeds from GI side  3. Continue to monitor H & H  4. Transfusion as needed  5. Monitor stool output and NG tube output for developing signs of bleeding    GI will sign off. D/w primary ICU team.  _______________________________________________    Subjective:     Patient seen and examined at bedside.  Patient remains with tracheostomy, in situ NG tube.  Overnight no further episodes of bleeding noted.  NG tube output has remained bilious.    Medication Administration - last 24 hours from 03/20/2024 1057 to 03/21/2024 1057         Date/Time Order Dose Route Action Action by     03/21/2024 0907 EDT nystatin (MYCOSTATIN) powder -- Topical Given Hallie Bailey RN     03/20/2024 1753 EDT nystatin (MYCOSTATIN) powder -- Topical Given Juvencio Oliveira RN     03/20/2024 1346 EDT nystatin (MYCOSTATIN) powder -- Topical MAR Unhold Moises Bowden MD     03/20/2024 1331 EDT nystatin (MYCOSTATIN) powder -- Topical MAR Hold Automatic Transfer Provider     03/21/2024 0906 EDT chlorhexidine (PERIDEX) 0.12 % oral rinse 15 mL 15 mL Mouth/Throat Given Hallie Bailey RN     03/20/2024 2200 EDT chlorhexidine (PERIDEX) 0.12 % oral rinse 15 mL 15 mL Mouth/Throat Given Rafael Franks     03/20/2024 1346 EDT chlorhexidine (PERIDEX) 0.12 % oral rinse 15 mL -- Mouth/Throat MAR Unhold Moises Bowden MD     03/20/2024 1331 EDT chlorhexidine (PERIDEX) 0.12 % oral rinse 15 mL -- Mouth/Throat MAR Hold Automatic Transfer Provider     03/21/2024 0713 EDT levalbuterol (XOPENEX) inhalation solution 1.25 mg 1.25 mg Nebulization Given  Hallietimothy Pickarddenis, RT     03/20/2024 2044 EDT levalbuterol (XOPENEX) inhalation solution 1.25 mg 1.25 mg Nebulization Given Scot Red, RT     03/20/2024 1400 EDT levalbuterol (XOPENEX) inhalation solution 1.25 mg -- Nebulization Canceled Entry Hallie Mane, RT     03/20/2024 1346 EDT levalbuterol (XOPENEX) inhalation solution 1.25 mg -- Nebulization MAR Unhold Moises Bowden MD     03/20/2024 1331 EDT levalbuterol (XOPENEX) inhalation solution 1.25 mg -- Nebulization MAR Hold Automatic Transfer Provider     03/20/2024 1257 EDT levalbuterol (XOPENEX) inhalation solution 1.25 mg 1.25 mg Nebulization Given Hallie Mane, RT     03/21/2024 0713 EDT ipratropium (ATROVENT) 0.02 % inhalation solution 0.5 mg 0.5 mg Nebulization Given Hallie Mane, RT     03/20/2024 2043 EDT ipratropium (ATROVENT) 0.02 % inhalation solution 0.5 mg 0.5 mg Nebulization Given Stefanloreto Red, RT     03/20/2024 1400 EDT ipratropium (ATROVENT) 0.02 % inhalation solution 0.5 mg -- Nebulization Canceled Entry Hallietimothy Mane, RT     03/20/2024 1346 EDT ipratropium (ATROVENT) 0.02 % inhalation solution 0.5 mg -- Nebulization MAR Unhold Moises Bowden MD     03/20/2024 1331 EDT ipratropium (ATROVENT) 0.02 % inhalation solution 0.5 mg -- Nebulization MAR Hold Automatic Transfer Provider     03/20/2024 1256 EDT ipratropium (ATROVENT) 0.02 % inhalation solution 0.5 mg 0.5 mg Nebulization Given Hallie Mane, RT     03/21/2024 0906 EDT lamoTRIgine (LaMICtal) tablet 150 mg 150 mg Oral Given Hallie Bailey RN     03/20/2024 1754 EDT lamoTRIgine (LaMICtal) tablet 150 mg 150 mg Oral Given Juvencio Oliveira RN     03/20/2024 1346 EDT lamoTRIgine (LaMICtal) tablet 150 mg -- Oral MAR Unhold Moises Bowden MD     03/20/2024 1331 EDT lamoTRIgine (LaMICtal) tablet 150 mg -- Oral MAR Hold Automatic Transfer Provider     03/21/2024 0906 EDT polyethylene glycol (MIRALAX) packet 17 g 17 g Per NG Tube Given Hallie Bailey RN     03/20/2024 1346 EDT  polyethylene glycol (MIRALAX) packet 17 g -- Per NG Tube MAR Unhold Moises Bowden MD     03/20/2024 1331 EDT polyethylene glycol (MIRALAX) packet 17 g -- Per NG Tube MAR Hold Automatic Transfer Provider     03/21/2024 0907 EDT sodium chloride (OCEAN) 0.65 % nasal spray 2 spray 2 spray Each Nare Given Hallie Bailey RN     03/21/2024 0547 EDT sodium chloride (OCEAN) 0.65 % nasal spray 2 spray 2 spray Each Nare Given Rafael Franks     03/21/2024 0213 EDT sodium chloride (OCEAN) 0.65 % nasal spray 2 spray 2 spray Each Nare Given Rafael Franks     03/20/2024 2300 EDT sodium chloride (OCEAN) 0.65 % nasal spray 2 spray 2 spray Each Nare Given Rafael Franks     03/20/2024 1754 EDT sodium chloride (OCEAN) 0.65 % nasal spray 2 spray 2 spray Each Nare Given Juvenico Oliveira RN     03/20/2024 1346 EDT sodium chloride (OCEAN) 0.65 % nasal spray 2 spray -- Each Nare MAR Unhold Moises Bowden MD     03/20/2024 1331 EDT sodium chloride (OCEAN) 0.65 % nasal spray 2 spray -- Each Nare MAR Hold Automatic Transfer Provider     03/20/2024 1321 EDT sodium chloride (OCEAN) 0.65 % nasal spray 2 spray 2 spray Each Nare Given Juvencio Oliveira RN     03/20/2024 1346 EDT sodium chloride (AYR SALINE NASAL) nasal gel 1 Application -- Nasal MAR Unhold Moises Bowden MD     03/20/2024 1331 EDT sodium chloride (AYR SALINE NASAL) nasal gel 1 Application -- Nasal MAR Hold Automatic Transfer Provider     03/21/2024 0850 EDT insulin regular (HumuLIN R,NovoLIN R) 1 Units/mL in sodium chloride 0.9 % 100 mL infusion 1 Units/hr Intravenous Rate/Dose Change Hallie Bailey RN     03/21/2024 0553 EDT insulin regular (HumuLIN R,NovoLIN R) 1 Units/mL in sodium chloride 0.9 % 100 mL infusion 0.5 Units/hr Intravenous Rate/Dose Change Rafael Franks     03/21/2024 0212 EDT insulin regular (HumuLIN R,NovoLIN R) 1 Units/mL in sodium chloride 0.9 % 100 mL infusion 1 Units/hr Intravenous Rate/Dose Change Rafael Golden     03/21/2024 0038 EDT insulin regular (HumuLIN R,NovoLIN R) 1  Units/mL in sodium chloride 0.9 % 100 mL infusion 1 Units/hr Intravenous New Bag Rafael Franks     03/20/2024 2208 EDT insulin regular (HumuLIN R,NovoLIN R) 1 Units/mL in sodium chloride 0.9 % 100 mL infusion 1 Units/hr Intravenous Rate/Dose Change Rafael Golden     03/20/2024 1801 EDT insulin regular (HumuLIN R,NovoLIN R) 1 Units/mL in sodium chloride 0.9 % 100 mL infusion 0 Units/hr Intravenous Hold Juvencio Oliveira RN     03/20/2024 1750 EDT insulin regular (HumuLIN R,NovoLIN R) 1 Units/mL in sodium chloride 0.9 % 100 mL infusion 1.5 Units/hr Intravenous New Bag Juvencio Oliveira RN     03/20/2024 1612 EDT insulin regular (HumuLIN R,NovoLIN R) 1 Units/mL in sodium chloride 0.9 % 100 mL infusion 1.5 Units/hr Intravenous Rate/Dose Change Juvencio Oliveira RN     03/20/2024 1206 EDT insulin regular (HumuLIN R,NovoLIN R) 1 Units/mL in sodium chloride 0.9 % 100 mL infusion 3 Units/hr Intravenous Rate/Dose Change Juvencio Oliveira RN     03/20/2024 1346 EDT ondansetron (ZOFRAN) injection 4 mg -- Intravenous MAR Unhold Moises Bowden MD     03/20/2024 1331 EDT ondansetron (ZOFRAN) injection 4 mg -- Intravenous MAR Hold Automatic Transfer Provider     03/20/2024 1346 EDT acetaminophen (TYLENOL) tablet 650 mg -- Oral MAR Unhold Moises Bowden MD     03/20/2024 1331 EDT acetaminophen (TYLENOL) tablet 650 mg -- Oral MAR Hold Automatic Transfer Provider     03/20/2024 1346 EDT fentaNYL injection 50 mcg -- Intravenous MAR Unhold Moises Bowden MD     03/20/2024 1331 EDT fentaNYL injection 50 mcg -- Intravenous MAR Hold Automatic Transfer Provider     03/20/2024 1331 EDT fentaNYL injection 50 mcg 50 mcg Intravenous Given Juvencio Oliveira RN     03/21/2024 0913 EDT topiramate (TOPAMAX) 6 mg/ml oral suspension 50 mg 50 mg Per PEG Tube Given Hallie Bailey RN     03/20/2024 1754 EDT topiramate (TOPAMAX) 6 mg/ml oral suspension 50 mg 50 mg Per PEG Tube Given Juvencio Oliveira RN     03/20/2024 0026 EDT topiramate (TOPAMAX) 6 mg/ml oral suspension 50 mg -- Per  PEG Tube MAR Unhold Moises Bowden MD     03/20/2024 1331 EDT topiramate (TOPAMAX) 6 mg/ml oral suspension 50 mg -- Per PEG Tube MAR Hold Automatic Transfer Provider     03/21/2024 0713 EDT sodium chloride 3 % inhalation solution 4 mL 4 mL Nebulization Given Hallie Mane, RT     03/20/2024 2044 EDT sodium chloride 3 % inhalation solution 4 mL 4 mL Nebulization Given Scot Moon, RT     03/20/2024 1400 EDT sodium chloride 3 % inhalation solution 4 mL -- Nebulization Canceled Entry Hallie Mane, RT     03/20/2024 1346 EDT sodium chloride 3 % inhalation solution 4 mL -- Nebulization MAR Unhold Moises Bowden MD     03/20/2024 1331 EDT sodium chloride 3 % inhalation solution 4 mL -- Nebulization MAR Hold Automatic Transfer Provider     03/20/2024 1254 EDT sodium chloride 3 % inhalation solution 4 mL 4 mL Nebulization Given Hallie Mane, RT     03/20/2024 1357 EDT lidocaine (PF) (XYLOCAINE-MPF) 2 % injection 10 mL -- Inhalation MAR Unhold Joe Lazcano, DO     03/20/2024 1331 EDT lidocaine (PF) (XYLOCAINE-MPF) 2 % injection 10 mL -- Inhalation MAR Hold Automatic Transfer Provider     03/21/2024 0903 EDT venlafaxine (EFFEXOR) tablet 12.5 mg 12.5 mg Oral Given Hallie Bailey RN     03/20/2024 1755 EDT venlafaxine (EFFEXOR) tablet 12.5 mg 12.5 mg Oral Given Juvencio Oliveira RN     03/20/2024 1346 EDT venlafaxine (EFFEXOR) tablet 12.5 mg -- Oral MAR Unhold Moises Bowden MD     03/20/2024 1331 EDT venlafaxine (EFFEXOR) tablet 12.5 mg -- Oral MAR Hold Automatic Transfer Provider     03/20/2024 1300 EDT venlafaxine (EFFEXOR) tablet 12.5 mg 12.5 mg Oral Given Juvencio Oliveira RN     03/21/2024 0906 EDT modafinil (PROVIGIL) tablet 100 mg 100 mg Per NG Tube Given Hallie Bailey RN     03/20/2024 1346 EDT modafinil (PROVIGIL) tablet 100 mg -- Per NG Tube MAR Unhold Moises Bowden MD     03/20/2024 1331 EDT modafinil (PROVIGIL) tablet 100 mg -- Per NG Tube MAR [facility] Automatic Transfer Provider     03/21/2024 0906 EDT pantoprazole  (PROTONIX) injection 40 mg 40 mg Intravenous Given Hallie Bailey RN     03/20/2024 2200 EDT pantoprazole (PROTONIX) injection 40 mg 40 mg Intravenous Given Rafael Franks     03/20/2024 1346 EDT pantoprazole (PROTONIX) injection 40 mg -- Intravenous MAR Unhold Moises Bowden MD     03/20/2024 1331 EDT pantoprazole (PROTONIX) injection 40 mg -- Intravenous MAR Hold Automatic Transfer Provider     03/20/2024 1346 EDT minocycline (MINOCIN) capsule 200 mg -- Oral MAR Unhold Moises Bowden MD     03/20/2024 1331 EDT minocycline (MINOCIN) capsule 200 mg -- Oral MAR Hold Automatic Transfer Provider     03/20/2024 1357 EDT multi-electrolyte (ISOLYTE-S PH 7.4) bolus 1,000 mL -- Intravenous MAR Unhold Joe LzacanoDO     03/20/2024 1331 EDT multi-electrolyte (ISOLYTE-S PH 7.4) bolus 1,000 mL -- Intravenous MAR Hold Automatic Transfer Provider     03/21/2024 0547 EDT methylPREDNISolone sodium succinate (Solu-MEDROL) injection 30 mg 30 mg Intravenous Given Rafael Franks     03/20/2024 2200 EDT methylPREDNISolone sodium succinate (Solu-MEDROL) injection 30 mg 30 mg Intravenous Given Rafael Franks     03/20/2024 1346 EDT methylPREDNISolone sodium succinate (Solu-MEDROL) injection 30 mg -- Intravenous MAR Unhold Moises Bowden MD     03/20/2024 1331 EDT methylPREDNISolone sodium succinate (Solu-MEDROL) injection 30 mg -- Intravenous MAR Hold Automatic Transfer Provider     03/20/2024 1320 EDT methylPREDNISolone sodium succinate (Solu-MEDROL) injection 30 mg 30 mg Intravenous Given Juvencio Oliveira RN     03/20/2024 1346 EDT methylPREDNISolone sodium succinate (Solu-MEDROL) injection 30 mg -- Intravenous MAR Unhold Moises Bowden MD     03/20/2024 1331 EDT methylPREDNISolone sodium succinate (Solu-MEDROL) injection 30 mg -- Intravenous MAR Hold Automatic Transfer Provider     03/20/2024 1346 EDT methylPREDNISolone sodium succinate (Solu-MEDROL) injection 20 mg -- Intravenous MAR Unhold Moises Bowden MD     03/20/2024 1331 EDT methylPREDNISolone sodium  succinate (Solu-MEDROL) injection 20 mg -- Intravenous MAR Hold Automatic Transfer Provider     03/20/2024 1346 EDT methylPREDNISolone sodium succinate (Solu-MEDROL) injection 10 mg -- Intravenous MAR Unhold Moises Bowden MD     03/20/2024 1331 EDT methylPREDNISolone sodium succinate (Solu-MEDROL) injection 10 mg -- Intravenous MAR Hold Automatic Transfer Provider     03/20/2024 1346 EDT methylPREDNISolone sodium succinate (Solu-MEDROL) injection 10 mg -- Intravenous MAR Unhold Moises Bowden MD     03/20/2024 1331 EDT methylPREDNISolone sodium succinate (Solu-MEDROL) injection 10 mg -- Intravenous MAR Hold Automatic Transfer Provider     03/20/2024 1530 EDT multi-electrolyte (ISOLYTE-S PH 7.4) bolus 1,000 mL 0 mL Intravenous Stopped Rafael Franks     03/20/2024 1414 EDT multi-electrolyte (ISOLYTE-S PH 7.4) bolus 1,000 mL 1,000 mL Intravenous New Bag Juvencio Oliveira RN     03/20/2024 1443 EDT multi-electrolyte (PLASMALYTE-A/ISOLYTE-S PH 7.4) IV solution 0 mL/hr Intravenous Stopped Juvencio Oliveira RN     03/20/2024 1414 EDT multi-electrolyte (PLASMALYTE-A/ISOLYTE-S PH 7.4) IV solution 75 mL/hr Intravenous New Bag Juvencio Oliveira RN     03/20/2024 1630 EDT multi-electrolyte (PLASMALYTE-A/ISOLYTE-S PH 7.4) IV solution 0 mL/hr Intravenous Stopped Juvencio Oliveira RN     03/20/2024 1443 EDT multi-electrolyte (PLASMALYTE-A/ISOLYTE-S PH 7.4) IV solution 75 mL/hr Intravenous Restarted Juvencio Oliveira RN     03/20/2024 1601 EDT sodium bicarbonate 150 mEq in sterile water 1,000 mL infusion 75 mL/hr Intravenous Not Given Juvencio Oliveira RN     03/21/2024 0902 EDT minocycline (DYNACIN) tablet 200 mg 200 mg Per NG Tube Given Hallie Bailey RN     03/20/2024 2259 EDT minocycline (DYNACIN) tablet 200 mg 200 mg Per NG Tube Given Rafael Franks     03/21/2024 0638 EDT sodium bicarbonate 150 mEq in dextrose 5 % 1,000 mL infusion 0 mL/hr Intravenous Stopped Rafael Franks     03/20/2024 1758 EDT sodium bicarbonate 150 mEq in dextrose 5 % 1,000 mL  "infusion 75 mL/hr Intravenous New Bag Juvencio Oliveira RN     03/21/2024 0906 EDT enoxaparin (LOVENOX) subcutaneous injection 40 mg 40 mg Subcutaneous Given Hallie Bailey RN            Objective:     Vitals: Blood pressure 128/68, pulse (!) 126, temperature 98.1 °F (36.7 °C), temperature source Oral, resp. rate 21, height 5' 8\" (1.727 m), weight 76.6 kg (168 lb 14 oz), SpO2 (!) 89%.,Body mass index is 25.68 kg/m².      Intake/Output Summary (Last 24 hours) at 3/21/2024 1057  Last data filed at 3/21/2024 0958  Gross per 24 hour   Intake 2383.61 ml   Output 2215 ml   Net 168.61 ml       Physical Exam:   General Appearance: Awake and alert, in no acute distress  Abdomen: Soft, non-tender, non-distended; bowel sounds normal; no masses or no organomegaly    Invasive Devices       Peripheral Intravenous Line  Duration             Long-Dwell Peripheral IV (Midline) 03/08/24 Left Cephalic Vein 12 days    Peripheral IV 03/20/24 Right Antecubital 1 day              Arterial Line  Duration             Arterial Line 01/10/24 Radial 70 days              Drain  Duration             Ileostomy RUQ 29 days    NG/OG/Enteral Tube Nasogastric 14 Fr Right nare 15 days    Urethral Catheter Latex 16 Fr. 15 days              Airway  Duration             Surgical Airway Shiley Cuffed 8 days                    Lab Results:  No results displayed because visit has over 200 results.          Imaging Studies: I have personally reviewed pertinent imaging studies.      "

## 2024-03-21 NOTE — OCCUPATIONAL THERAPY NOTE
Occupational Therapy Progress Note     Patient Name: Carla Hunt  Today's Date: 3/21/2024  Problem List  Principal Problem:    Acute respiratory failure with hypoxia (HCC)  Active Problems:    Anxiety state    Encephalopathy    COVID    Stage 3b chronic kidney disease (CKD) (HCC)    Teto marginal zone B-cell lymphoma (HCC)    Seizure disorder (HCC)    Hypotension    Palliative care patient    Goals of care, counseling/discussion    Acute kidney injury (HCC)    Cecal volvulus (HCC)          03/21/24 1107   OT Last Visit   OT Visit Date 03/21/24   Note Type   Note Type Treatment   Pain Assessment   Pain Assessment Tool 0-10   Pain Score No Pain   Hospital Pain Intervention(s) Repositioned;Ambulation/increased activity;Emotional support   Restrictions/Precautions   Weight Bearing Precautions Per Order No   Other Precautions Contact/isolation;Airborne/isolation;Cognitive;Multiple lines;Telemetry;O2;Fall Risk;Pain  (trach collar, marie)   Lifestyle   Autonomy I adls and mobility - i iadls - shares homemaking with family   Reciprocal Relationships supportive family   Service to Others volunteers   Intrinsic Gratification Enjoys spending time with her family   ADL   Where Assessed Edge of bed   Grooming Assistance 1  Total Assistance   Grooming Deficit Setup;Steadying;Verbal cueing;Supervision/safety;Increased time to complete;Brushing hair   Grooming Comments Sat EOB for approx 20 minutes with varying Mod/Max A sitting balance   LB Dressing Assistance 2  Maximal Assistance   LB Dressing Deficit Don/doff R sock;Don/doff L sock   Toileting Assistance  1  Total Assistance   Toileting Deficit Perineal hygiene   Bed Mobility   Supine to Sit 2  Maximal assistance   Additional items Assist x 2;HOB elevated;Bedrails;Increased time required;Verbal cues;LE management   Sit to Supine 2  Maximal assistance   Additional items Assist x 2;HOB elevated;Bedrails;Increased time required;Verbal cues;LE management   Additional  Comments Sat EOB for approx 20 minutes to completed seated ADLs with Mod/Max sitting balance; @ end of session, pt left in chair position in bed with all functional needs in reach with family present in room   Transfers   Sit to Stand 2  Maximal assistance   Additional items Assist x 2;Assist x 3;Increased time required;Verbal cues   Stand to Sit 2  Maximal assistance   Additional items Assist x 2;Assist x 3;Increased time required;Verbal cues   Additional Comments x2 STS w/ Max A x2-3 w/ b/l HHA; stood for approx 5-7 seconds   Subjective   Subjective Responded with simple yes/no questions with occasional head nods   Cognition   Overall Cognitive Status Impaired   Arousal/Participation Responsive;Arousable;Cooperative   Following Commands Follows one step commands with increased time or repetition   Comments Pt presenting with flat affect; nods yes/no to simple questions; requires hand over hand assistance for functional activity; occasional smile/attempting to wave when sitting EOB, looking at family members; when sitting upright in chair position, pt presenting with increased alertness   Activity Tolerance   Activity Tolerance Patient limited by fatigue;Treatment limited secondary to medical complications (Comment)   Medical Staff Made Aware KRYSTIN Sanchez; Restorative Tech; RN cleared and present @ times of session   Assessment   Assessment Pt is a 58 yo female who participated in skilled OT session on 3/21/2024. Pt seen with PT to increase safety, decrease fall risk, and maximize functional/occupational performance 2* medical complexity which is a regression from pt's functional baseline. Treatment focused to improve functional transfers with fall prevention strategies, static/dynamic balance, postural/trunk control, proper body mechanics, functional use of b/l UE's, higher level cognitive functions, safety awareness, and overall increased activity tolerance in ADL/IADL/leisure tasks. Upon arrival, pt found lying  supine in bed and was agreeable to OT session. Pt performing supine <> sit with Max A x2 and sat EOB for approx 20 minutes with varying Mod/Max A sitting balance. Pt engaging in various grooming ADLs with varying Max/total assistance. Pt performed x2 STSE w/ Max A x2-3 w/ b/l HHA and stood for approx 5-7 seconds. At the end of the session, pt was left lying supine in bed in chair position with all functional needs in reach. Pt demonstrates gradual functional improvements towards OT goals however continues to be functioning below occupational baseline and is still limited by the following limitations/impairments which were addressed through skilled instruction: cognition, functional ROM, fine motor skills, generalized weakness, balance, endurance/activity tolerance, postural/trunk control, strength, pain, and safety awareness. At this time, recommend discharge to post-acute rehab when medically stable. The patient's raw score on the AM-PAC Daily Activity Inpatient Short Form is 11. A raw score of less than 19 suggests the patient may benefit from discharge to post-acute rehabilitation services. Please refer to the recommendation of the Occupational Therapist for safe discharge planning.  Established OT goals will be continued 2-3x/wk to address acute care needs and underlying performance skills to maximize occupational performance and safety to return to [facility].   Plan   Treatment Interventions ADL retraining;Functional transfer training;UE strengthening/ROM;Endurance training;Cognitive reorientation;Patient/family training;Equipment evaluation/education;Neuromuscular reeducation;Fine motor coordination activities;Compensatory technique education;Continued evaluation;Energy conservation;Activityengagement   Goal Expiration Date 04/02/24   OT Treatment Day 12   OT Frequency 2-3x/wk   Discharge Recommendation   Rehab Resource Intensity Level, OT II (Moderate Resource Intensity)   AM-PAC Daily Activity Inpatient   Lower  Body Dressing 2   Bathing 2   Toileting 1   Upper Body Dressing 2   Grooming 2   Eating 2   Daily Activity Raw Score 11   Daily Activity Standardized Score (Calc for Raw Score >=11) 29.04   AM-PAC Applied Cognition Inpatient   Following a Speech/Presentation 1   Understanding Ordinary Conversation 2   Taking Medications 1   Remembering Where Things Are Placed or Put Away 1   Remembering List of 4-5 Errands 1   Taking Care of Complicated Tasks 1   Applied Cognition Raw Score 7   Applied Cognition Standardized Score 15.17   End of Consult   Education Provided Yes;Family or social support of family present for education by provider   Patient Position at End of Consult Bedside chair;Bed/Chair alarm activated;All needs within reach  (In chair position in bed)   Nurse Communication Nurse aware of consult     Kaila Michel MS, OTR/L

## 2024-03-21 NOTE — PLAN OF CARE
Problem: Prexisting or High Potential for Compromised Skin Integrity  Goal: Skin integrity is maintained or improved  Description: INTERVENTIONS:  - Identify patients at risk for skin breakdown  - Assess and monitor skin integrity  - Assess and monitor nutrition and hydration status  - Monitor labs   - Assess for incontinence   - Turn and reposition patient  - Assist with mobility/ambulation  - Relieve pressure over bony prominences  - Avoid friction and shearing  - Provide appropriate hygiene as needed including keeping skin clean and dry  - Evaluate need for skin moisturizer/barrier cream  - Collaborate with interdisciplinary team   - Patient/family teaching  - Consider wound care consult   Outcome: Progressing     Problem: Nutrition/Hydration-ADULT  Goal: Nutrient/Hydration intake appropriate for improving, restoring or maintaining nutritional needs  Description: Monitor and assess patient's nutrition/hydration status for malnutrition. Collaborate with interdisciplinary team and initiate plan and interventions as ordered.  Monitor patient's weight and dietary intake as ordered or per policy. Utilize nutrition screening tool and intervene as necessary. Determine patient's food preferences and provide high-protein, high-caloric foods as appropriate.     INTERVENTIONS:  - Monitor oral intake, urinary output, labs, and treatment plans  - Assess nutrition and hydration status and recommend course of action  - Evaluate amount of meals eaten  - Assist patient with eating if necessary   - Allow adequate time for meals  - Recommend/ encourage appropriate diets, oral nutritional supplements, and vitamin/mineral supplements  - Order, calculate, and assess calorie counts as needed  - Recommend, monitor, and adjust tube feedings and TPN/PPN based on assessed needs  - Assess need for intravenous fluids  - Provide specific nutrition/hydration education as appropriate  - Include patient/family/caregiver in decisions related to  nutrition  Outcome: Progressing     Problem: SAFETY,RESTRAINT: NV/NON-SELF DESTRUCTIVE BEHAVIOR  Goal: Remains free of harm/injury (restraint for non violent/non self-detsructive behavior)  Description: INTERVENTIONS:  - Instruct patient/family regarding restraint use   - Assess and monitor physiologic and psychological status   - Provide interventions and comfort measures to meet assessed patient needs   - Identify and implement measures to help patient regain control  - Assess readiness for release of restraint   Outcome: Progressing  Goal: Returns to optimal restraint-free functioning  Description: INTERVENTIONS:  - Assess the patient's behavior and symptoms that indicate continued need for restraint  - Identify and implement measures to help patient regain control  - Assess readiness for release of restraint   Outcome: Progressing     Problem: INFECTION - ADULT  Goal: Absence or prevention of progression during hospitalization  Description: INTERVENTIONS:  - Assess and monitor for signs and symptoms of infection  - Monitor lab/diagnostic results  - Monitor all insertion sites, i.e. indwelling lines, tubes, and drains  - Monitor endotracheal if appropriate and nasal secretions for changes in amount and color  - [place] appropriate cooling/warming therapies per order  - Administer medications as ordered  - Instruct and encourage patient and family to use good hand hygiene technique  - Identify and instruct in appropriate isolation precautions for identified infection/condition  Outcome: Progressing     Problem: SAFETY ADULT  Goal: Patient will remain free of falls  Description: INTERVENTIONS:  - Educate patient/family on patient safety including physical limitations  - Instruct patient to call for assistance with activity   - Consult OT/PT to assist with strengthening/mobility   - Keep Call bell within reach  - Keep bed low and locked with side rails adjusted as appropriate  - Keep care items and personal  belongings within reach  - Initiate and maintain comfort rounds  - Make Fall Risk Sign visible to staff  - Offer Toileting every 2 Hours, in advance of need  - Initiate/Maintain bed alarm  - Obtain necessary fall risk management equipment: non skid footwear  - Apply yellow socks and bracelet for high fall risk patients  - Consider moving patient to room near nurses station  Outcome: Progressing     Problem: RESPIRATORY - ADULT  Goal: Achieves optimal ventilation and oxygenation  Description: INTERVENTIONS:  - Assess for changes in respiratory status  - Assess for changes in mentation and behavior  - Position to facilitate oxygenation and minimize respiratory effort  - Oxygen administered by appropriate delivery if ordered  - Initiate smoking cessation education as indicated  - Encourage broncho-pulmonary hygiene including cough, deep breathe, Incentive Spirometry  - Assess the need for suctioning and aspirate as needed  - Assess and instruct to report SOB or any respiratory difficulty  - Respiratory Therapy support as indicated  Outcome: Progressing     Problem: SKIN/TISSUE INTEGRITY - ADULT  Goal: Skin Integrity remains intact(Skin Breakdown Prevention)  Description: Assess:  -Perform Flavio assessment every shift   -Clean and moisturize skin every day   -Inspect skin when repositioning, toileting, and assisting with ADLS  -Assess under medical devices such as ronny  every hour   -Assess extremities for adequate circulation and sensation     Bed Management:  -Have minimal linens on bed & keep smooth, unwrinkled  -Change linens as needed when moist or perspiring  -Avoid sitting or lying in one position for more than 2 hours while in bed  -Keep HOB at 45 degrees     Toileting:  -Offer bedside commode  -Assess for incontinence every hour  -Use incontinent care products after each incontinent episode such as moisture barrier cream     Activity:  -Mobilize patient 3 times a day  -Encourage activity and walks on  unit  -Encourage or provide ROM exercises   -Turn and reposition patient every 2 Hours  -Use appropriate equipment to lift or move patient in bed  -Instruct/ Assist with weight shifting every hour when out of bed in chair  -Consider limitation of chair time 2 hour intervals    Skin Care:  -Avoid use of baby powder, tape, friction and shearing, hot water or constrictive clothing  -Relieve pressure over bony prominences using allevyn   -Do not massage red bony areas    Next Steps:  -Teach patient strategies to minimize risks such as weight shifting    -Consider consults to  interdisciplinary teams such as PT  Outcome: Progressing  Goal: Incision(s), wounds(s) or drain site(s) healing without S/S of infection  Description: INTERVENTIONS  - Assess and document dressing, incision, wound bed, drain sites and surrounding tissue  - Provide patient and family education  - Perform skin care/dressing changes every 12 hr  Outcome: Progressing  Goal: Pressure injury heals and does not worsen  Description: Interventions:  - Implement low air loss mattress or specialty surface (Criteria met)  - Apply silicone foam dressing  - Instruct/assist with weight shifting every 4 hr minutes when in chair   - Limit chair time to 12 hr hour intervals  - Use special pressure reducing interventions such as wedges when in chair   - Apply fecal or urinary incontinence containment device   - Perform passive or active ROM every 4 hr  - Turn and reposition patient & offload bony prominences every 2 hours   - Utilize friction reducing device or surface for transfers   - Consider consults to  interdisciplinary teams such as pt/ot  - Use incontinent care products after each incontinent episode such as incontinence foam  - Consider nutrition services referral as needed  Outcome: Progressing     Problem: NEUROSENSORY - ADULT  Goal: Achieves stable or improved neurological status  Description: INTERVENTIONS  - Monitor and report changes in neurological  status  - Monitor vital signs such as temperature, blood pressure, glucose, and any other labs ordered   - Initiate measures to prevent increased intracranial pressure  - Monitor for seizure activity and implement precautions if appropriate      Outcome: Progressing  Goal: Achieves maximal functionality and self care  Description: INTERVENTIONS  - Monitor swallowing and airway patency with patient fatigue and changes in neurological status  - Encourage and assist patient to increase activity and self care.   - Encourage visually impaired, hearing impaired and aphasic patients to use assistive/communication devices  Outcome: Progressing     Problem: CARDIOVASCULAR - ADULT  Goal: Maintains optimal cardiac output and hemodynamic stability  Description: INTERVENTIONS:  - Monitor I/O, vital signs and rhythm  - Monitor for S/S and trends of decreased cardiac output  - Administer and titrate ordered vasoactive medications to optimize hemodynamic stability  - Assess quality of pulses, skin color and temperature  - Assess for signs of decreased coronary artery perfusion  - Instruct patient to report change in severity of symptoms  Outcome: Progressing  Goal: Absence of cardiac dysrhythmias or at baseline rhythm  Description: INTERVENTIONS:  - Continuous cardiac monitoring, vital signs, obtain 12 lead EKG if ordered  - Administer antiarrhythmic and heart rate control medications as ordered  - Monitor electrolytes and administer replacement therapy as ordered  Outcome: Progressing     Problem: GENITOURINARY - ADULT  Goal: Maintains or returns to baseline urinary function  Description: INTERVENTIONS:  - Assess urinary function  - Encourage oral fluids to ensure adequate hydration if ordered  - Administer IV fluids as ordered to ensure adequate hydration  - Administer ordered medications as needed  - Offer frequent toileting  - Follow urinary retention protocol if ordered  Outcome: Progressing  Goal: Urinary catheter remains  patent  Description: INTERVENTIONS:  - Assess patency of urinary catheter  - If patient has a chronic marie, consider changing catheter if non-functioning  - Follow guidelines for intermittent irrigation of non-functioning urinary catheter  Outcome: Progressing     Problem: METABOLIC, FLUID AND ELECTROLYTES - ADULT  Goal: Electrolytes maintained within normal limits  Description: INTERVENTIONS:  - Monitor labs and assess patient for signs and symptoms of electrolyte imbalances  - Administer electrolyte replacement as ordered  - Monitor response to electrolyte replacements, including repeat lab results as appropriate  - Instruct patient on fluid and nutrition as appropriate  Outcome: Progressing  Goal: Fluid balance maintained  Description: INTERVENTIONS:  - Monitor labs   - Monitor I/O and WT  - Instruct patient on fluid and nutrition as appropriate  - Assess for signs & symptoms of volume excess or deficit  Outcome: Progressing  Goal: Glucose maintained within target range  Description: INTERVENTIONS:  - Monitor Blood Glucose as ordered  - Assess for signs and symptoms of hyperglycemia and hypoglycemia  - Administer ordered medications to maintain glucose within target range  - Assess nutritional intake and initiate nutrition service referral as needed  Outcome: Progressing     Problem: MUSCULOSKELETAL - ADULT  Goal: Maintain or return mobility to safest level of function  Description: INTERVENTIONS:  - Assess patient's ability to carry out ADLs; assess patient's baseline for ADL function and identify physical deficits which impact ability to perform ADLs (bathing, care of mouth/teeth, toileting, grooming, dressing, etc.)  - Assess/evaluate cause of self-care deficits   - Assess range of motion  - Assess patient's mobility  - Assess patient's need for assistive devices and provide as appropriate  - Encourage maximum independence but intervene and supervise when necessary  - Involve family in performance of ADLs  -  Assess for home care needs following discharge   - Consider OT consult to assist with ADL evaluation and planning for discharge  - Provide patient education as appropriate  Outcome: Progressing     Problem: HEMATOLOGIC - ADULT  Goal: Maintains hematologic stability  Description: INTERVENTIONS  - Assess for signs and symptoms of bleeding or hemorrhage  - Monitor labs  - Administer supportive blood products/factors as ordered and appropriate  Outcome: Progressing     Problem: COPING  Goal: Pt/Family able to verbalize concerns and demonstrate effective coping strategies  Description: INTERVENTIONS:  - Assist patient/family to identify coping skills, available support systems and cultural and spiritual values  - Provide emotional support, including active listening and acknowledgement of concerns of patient and caregivers  - Reduce environmental stimuli, as able  - Provide patient education  - Assess for spiritual pain/suffering and initiate spiritual care, including notification of Pastoral Care or nat[place] based community as needed  - Assess effectiveness of coping strategies  Outcome: Progressing  Goal: Will report anxiety at manageable levels  Description: INTERVENTIONS:  - Administer medication as ordered  - Teach and encourage coping skills  - Provide emotional support  - Assess patient/family for anxiety and ability to cope  Outcome: Progressing     Problem: BEHAVIOR  Goal: Pt/Family maintain appropriate behavior and adhere to behavioral management agreement, if implemented  Description: INTERVENTIONS:  - Assess the family dynamic   - Encourage verbalization of thoughts and concerns in a socially appropriate manner  - Assess patient/family's coping skills and non-compliant behavior (including use of illegal substances).  - Utilize positive, consistent limit setting strategies supporting safety of patient, staff and others  - Initiate consult with Case Management, Spiritual Care or other ancillary services as  appropriate  - If a patient's/visitor's behavior jeopardizes the safety of the patient, staff, or others, refer to organization procedure.   - Notify Security of behavior or suspected illegal substances which indicate the need for search of the patient and/or belongings  - Encourage participation in the decision making process about a behavioral management agreement; implement if patient meets criteria  Outcome: Progressing     Problem: Potential for Falls  Goal: Patient will remain free of falls  Description: INTERVENTIONS:  - Educate patient/family on patient safety including physical limitations  - Instruct patient to call for assistance with activity   - Consult OT/PT to assist with strengthening/mobility   - Keep Call bell within reach  - Keep bed low and locked with side rails adjusted as appropriate  - Keep care items and personal belongings within reach  - Initiate and maintain comfort rounds  - Make Fall Risk Sign visible to staff  - Offer Toileting every 2 Hours, in advance of need  - Initiate/Maintain bed alarm  - Obtain necessary fall risk management equipment: non skid footwear  - Apply yellow socks and bracelet for high fall risk patients  - Consider moving patient to room near nurses station  Outcome: Progressing

## 2024-03-22 LAB
ABO GROUP BLD BPU: NORMAL
ANION GAP SERPL CALCULATED.3IONS-SCNC: 12 MMOL/L (ref 4–13)
BPU ID: NORMAL
BUN SERPL-MCNC: 77 MG/DL (ref 5–25)
CALCIUM SERPL-MCNC: 8.8 MG/DL (ref 8.4–10.2)
CHLORIDE SERPL-SCNC: 113 MMOL/L (ref 96–108)
CO2 SERPL-SCNC: 20 MMOL/L (ref 21–32)
CREAT SERPL-MCNC: 1.33 MG/DL (ref 0.6–1.3)
CROSSMATCH: NORMAL
ERYTHROCYTE [DISTWIDTH] IN BLOOD BY AUTOMATED COUNT: 18.2 % (ref 11.6–15.1)
GFR SERPL CREATININE-BSD FRML MDRD: 44 ML/MIN/1.73SQ M
GLUCOSE SERPL-MCNC: 103 MG/DL (ref 65–140)
GLUCOSE SERPL-MCNC: 150 MG/DL (ref 65–140)
GLUCOSE SERPL-MCNC: 152 MG/DL (ref 65–140)
GLUCOSE SERPL-MCNC: 162 MG/DL (ref 65–140)
GLUCOSE SERPL-MCNC: 170 MG/DL (ref 65–140)
GLUCOSE SERPL-MCNC: 172 MG/DL (ref 65–140)
GLUCOSE SERPL-MCNC: 176 MG/DL (ref 65–140)
GLUCOSE SERPL-MCNC: 177 MG/DL (ref 65–140)
GLUCOSE SERPL-MCNC: 199 MG/DL (ref 65–140)
GLUCOSE SERPL-MCNC: 207 MG/DL (ref 65–140)
GLUCOSE SERPL-MCNC: 218 MG/DL (ref 65–140)
GLUCOSE SERPL-MCNC: 228 MG/DL (ref 65–140)
GLUCOSE SERPL-MCNC: 84 MG/DL (ref 65–140)
HCT VFR BLD AUTO: 28.6 % (ref 34.8–46.1)
HGB BLD-MCNC: 9.8 G/DL (ref 11.5–15.4)
MAGNESIUM SERPL-MCNC: 2.3 MG/DL (ref 1.9–2.7)
MCH RBC QN AUTO: 30.8 PG (ref 26.8–34.3)
MCHC RBC AUTO-ENTMCNC: 34.3 G/DL (ref 31.4–37.4)
MCV RBC AUTO: 90 FL (ref 82–98)
NRBC BLD AUTO-RTO: 1 /100 WBCS
PHOSPHATE SERPL-MCNC: 5.9 MG/DL (ref 2.7–4.5)
PLATELET # BLD AUTO: 77 THOUSANDS/UL (ref 149–390)
PMV BLD AUTO: 11.2 FL (ref 8.9–12.7)
POTASSIUM SERPL-SCNC: 4 MMOL/L (ref 3.5–5.3)
RBC # BLD AUTO: 3.18 MILLION/UL (ref 3.81–5.12)
SODIUM SERPL-SCNC: 145 MMOL/L (ref 135–147)
UNIT DISPENSE STATUS: NORMAL
UNIT PRODUCT CODE: NORMAL
UNIT PRODUCT VOLUME: 248 ML
UNIT PRODUCT VOLUME: 350 ML
UNIT RH: NORMAL
WBC # BLD AUTO: 3.89 THOUSAND/UL (ref 4.31–10.16)

## 2024-03-22 PROCEDURE — 97535 SELF CARE MNGMENT TRAINING: CPT

## 2024-03-22 PROCEDURE — 97110 THERAPEUTIC EXERCISES: CPT

## 2024-03-22 PROCEDURE — 97530 THERAPEUTIC ACTIVITIES: CPT

## 2024-03-22 PROCEDURE — 85027 COMPLETE CBC AUTOMATED: CPT

## 2024-03-22 PROCEDURE — 94760 N-INVAS EAR/PLS OXIMETRY 1: CPT | Performed by: SOCIAL WORKER

## 2024-03-22 PROCEDURE — 83735 ASSAY OF MAGNESIUM: CPT | Performed by: STUDENT IN AN ORGANIZED HEALTH CARE EDUCATION/TRAINING PROGRAM

## 2024-03-22 PROCEDURE — 94760 N-INVAS EAR/PLS OXIMETRY 1: CPT

## 2024-03-22 PROCEDURE — 84100 ASSAY OF PHOSPHORUS: CPT

## 2024-03-22 PROCEDURE — 99233 SBSQ HOSP IP/OBS HIGH 50: CPT | Performed by: STUDENT IN AN ORGANIZED HEALTH CARE EDUCATION/TRAINING PROGRAM

## 2024-03-22 PROCEDURE — 99232 SBSQ HOSP IP/OBS MODERATE 35: CPT | Performed by: PHYSICIAN ASSISTANT

## 2024-03-22 PROCEDURE — 80048 BASIC METABOLIC PNL TOTAL CA: CPT

## 2024-03-22 PROCEDURE — 94762 N-INVAS EAR/PLS OXIMTRY CONT: CPT

## 2024-03-22 PROCEDURE — 99291 CRITICAL CARE FIRST HOUR: CPT | Performed by: INTERNAL MEDICINE

## 2024-03-22 PROCEDURE — 97112 NEUROMUSCULAR REEDUCATION: CPT

## 2024-03-22 PROCEDURE — C9113 INJ PANTOPRAZOLE SODIUM, VIA: HCPCS | Performed by: INTERNAL MEDICINE

## 2024-03-22 PROCEDURE — 82948 REAGENT STRIP/BLOOD GLUCOSE: CPT

## 2024-03-22 PROCEDURE — 94003 VENT MGMT INPAT SUBQ DAY: CPT

## 2024-03-22 PROCEDURE — 94640 AIRWAY INHALATION TREATMENT: CPT | Performed by: SOCIAL WORKER

## 2024-03-22 PROCEDURE — 99232 SBSQ HOSP IP/OBS MODERATE 35: CPT | Performed by: INTERNAL MEDICINE

## 2024-03-22 PROCEDURE — 94664 DEMO&/EVAL PT USE INHALER: CPT | Performed by: SOCIAL WORKER

## 2024-03-22 PROCEDURE — 92597 ORAL SPEECH DEVICE EVAL: CPT

## 2024-03-22 RX ORDER — SODIUM CHLORIDE, SODIUM GLUCONATE, SODIUM ACETATE, POTASSIUM CHLORIDE, MAGNESIUM CHLORIDE, SODIUM PHOSPHATE, DIBASIC, AND POTASSIUM PHOSPHATE .53; .5; .37; .037; .03; .012; .00082 G/100ML; G/100ML; G/100ML; G/100ML; G/100ML; G/100ML; G/100ML
1000 INJECTION, SOLUTION INTRAVENOUS ONCE
Status: COMPLETED | OUTPATIENT
Start: 2024-03-22 | End: 2024-03-22

## 2024-03-22 RX ADMIN — SODIUM CHLORIDE 5 UNITS/HR: 9 INJECTION, SOLUTION INTRAVENOUS at 12:30

## 2024-03-22 RX ADMIN — MINOCYCLINE HYDROCHLORIDE 200 MG: 50 TABLET, FILM COATED ORAL at 17:48

## 2024-03-22 RX ADMIN — METHYLPREDNISOLONE SODIUM SUCCINATE 30 MG: 40 INJECTION, POWDER, FOR SOLUTION INTRAMUSCULAR; INTRAVENOUS at 20:41

## 2024-03-22 RX ADMIN — SODIUM CHLORIDE SOLN NEBU 3% 4 ML: 3 NEBU SOLN at 13:20

## 2024-03-22 RX ADMIN — NYSTATIN: 100000 POWDER TOPICAL at 08:31

## 2024-03-22 RX ADMIN — TOPIRAMATE 50 MG: 100 TABLET, FILM COATED ORAL at 17:48

## 2024-03-22 RX ADMIN — TOPIRAMATE 50 MG: 100 TABLET, FILM COATED ORAL at 08:24

## 2024-03-22 RX ADMIN — LAMOTRIGINE 150 MG: 100 TABLET ORAL at 08:26

## 2024-03-22 RX ADMIN — PANTOPRAZOLE SODIUM 40 MG: 40 INJECTION, POWDER, FOR SOLUTION INTRAVENOUS at 08:24

## 2024-03-22 RX ADMIN — LAMOTRIGINE 150 MG: 100 TABLET ORAL at 17:51

## 2024-03-22 RX ADMIN — CHLORHEXIDINE GLUCONATE 0.12% ORAL RINSE 15 ML: 1.2 LIQUID ORAL at 20:41

## 2024-03-22 RX ADMIN — Medication 2 SPRAY: at 10:42

## 2024-03-22 RX ADMIN — VENLAFAXINE 12.5 MG: 25 TABLET ORAL at 13:00

## 2024-03-22 RX ADMIN — CHLORHEXIDINE GLUCONATE 0.12% ORAL RINSE 15 ML: 1.2 LIQUID ORAL at 08:23

## 2024-03-22 RX ADMIN — IPRATROPIUM BROMIDE 0.5 MG: 0.5 SOLUTION RESPIRATORY (INHALATION) at 13:20

## 2024-03-22 RX ADMIN — SODIUM CHLORIDE SOLN NEBU 3% 4 ML: 3 NEBU SOLN at 21:20

## 2024-03-22 RX ADMIN — LEVALBUTEROL HYDROCHLORIDE 1.25 MG: 1.25 SOLUTION RESPIRATORY (INHALATION) at 07:17

## 2024-03-22 RX ADMIN — ENOXAPARIN SODIUM 40 MG: 40 INJECTION SUBCUTANEOUS at 10:00

## 2024-03-22 RX ADMIN — Medication 2 SPRAY: at 06:06

## 2024-03-22 RX ADMIN — Medication 2 SPRAY: at 14:14

## 2024-03-22 RX ADMIN — MODAFINIL 100 MG: 100 TABLET ORAL at 08:26

## 2024-03-22 RX ADMIN — LEVALBUTEROL HYDROCHLORIDE 1.25 MG: 1.25 SOLUTION RESPIRATORY (INHALATION) at 21:20

## 2024-03-22 RX ADMIN — VENLAFAXINE 12.5 MG: 25 TABLET ORAL at 08:31

## 2024-03-22 RX ADMIN — Medication 2 SPRAY: at 17:49

## 2024-03-22 RX ADMIN — LEVALBUTEROL HYDROCHLORIDE 1.25 MG: 1.25 SOLUTION RESPIRATORY (INHALATION) at 13:20

## 2024-03-22 RX ADMIN — PANTOPRAZOLE SODIUM 40 MG: 40 INJECTION, POWDER, FOR SOLUTION INTRAVENOUS at 20:42

## 2024-03-22 RX ADMIN — NYSTATIN: 100000 POWDER TOPICAL at 17:49

## 2024-03-22 RX ADMIN — SODIUM CHLORIDE, SODIUM GLUCONATE, SODIUM ACETATE, POTASSIUM CHLORIDE, MAGNESIUM CHLORIDE, SODIUM PHOSPHATE, DIBASIC, AND POTASSIUM PHOSPHATE 1000 ML: .53; .5; .37; .037; .03; .012; .00082 INJECTION, SOLUTION INTRAVENOUS at 14:14

## 2024-03-22 RX ADMIN — Medication 2 SPRAY: at 22:31

## 2024-03-22 RX ADMIN — Medication 2 SPRAY: at 02:07

## 2024-03-22 RX ADMIN — POLYETHYLENE GLYCOL 3350 17 G: 17 POWDER, FOR SOLUTION ORAL at 08:24

## 2024-03-22 RX ADMIN — MINOCYCLINE HYDROCHLORIDE 200 MG: 50 TABLET, FILM COATED ORAL at 10:00

## 2024-03-22 RX ADMIN — METHYLPREDNISOLONE SODIUM SUCCINATE 30 MG: 40 INJECTION, POWDER, FOR SOLUTION INTRAMUSCULAR; INTRAVENOUS at 08:27

## 2024-03-22 RX ADMIN — SODIUM CHLORIDE SOLN NEBU 3% 4 ML: 3 NEBU SOLN at 07:17

## 2024-03-22 RX ADMIN — IPRATROPIUM BROMIDE 0.5 MG: 0.5 SOLUTION RESPIRATORY (INHALATION) at 07:17

## 2024-03-22 RX ADMIN — VENLAFAXINE 12.5 MG: 25 TABLET ORAL at 17:50

## 2024-03-22 RX ADMIN — IPRATROPIUM BROMIDE 0.5 MG: 0.5 SOLUTION RESPIRATORY (INHALATION) at 21:20

## 2024-03-22 NOTE — PHYSICAL THERAPY NOTE
PT Treatment       03/22/24 1440   PT Last Visit   PT Visit Date 03/22/24   Note Type   Note Type Treatment   Pain Assessment   Pain Assessment Tool 0-10   Pain Score No Pain   Restrictions/Precautions   Weight Bearing Precautions Per Order No   Braces or Orthoses Other (Comment)  (b/l resting hand splints)   Other Precautions Airborne/isolation;Cognitive;Chair Alarm;Bed Alarm;Aspiration;Multiple lines;Telemetry;Fall Risk  (NPO, trach, wound vac, marie)   General   Chart Reviewed Yes   Response to Previous Treatment Patient with no complaints from previous session.   Family/Caregiver Present Yes   Cognition   Overall Cognitive Status Impaired   Arousal/Participation Responsive;Arousable;Cooperative   Attention Difficulty attending to directions   Orientation Level Oriented to person   Memory Unable to assess   Following Commands Follows one step commands inconsistently   Comments pt reponds w/ yes or no nods; can also give a thumbs up or shake hand when asked; more aware compared to previous session   Subjective   Subjective Agreeable to PT   Bed Mobility   Supine to Sit 2  Maximal assistance   Additional items Assist x 2;LE management;Other  (trunk support)   Sit to Supine 2  Maximal assistance   Additional items Assist x 2;LE management;Other  (trunk management)   Additional Comments pt received in bed and left in bed at end of treatment   Transfers   Sit to Stand 2  Maximal assistance   Additional items Assist x 3;Verbal cues   Stand to Sit 2  Maximal assistance   Additional items Assist x 3;Verbal cues   Additional Comments w/ b/l HHA and b/l knee blocking and +1 behind pt supporting at ischial tuberosities, pt able to achieve 80% ROM, pt tolerated 30 degrees tilt in KREG bed for 10 minutes   Ambulation/Elevation   Gait pattern Not appropriate   Balance   Static Sitting Poor   Dynamic Sitting Poor -   Static Standing Poor -   Endurance Deficit   Endurance Deficit Yes   Endurance Deficit Description weakness  and fatigue   Activity Tolerance   Activity Tolerance Patient limited by fatigue   Medical Staff Made Aware OT   Nurse Made Aware yes   Exercises   Hip Flexion Sitting;Bilateral;AAROM;10 reps  (D1 flexion pattern)   Knee AROM Long Arc Quad Bilateral;AAROM;10 reps;Sitting   Ankle Pumps Sitting;Bilateral;AAROM;10 reps   Balance Training Sitting  (10 minutes sitting EOB static balance w/ mod assist x 1)   Assessment   Prognosis Fair   Problem List Decreased strength;Decreased range of motion;Decreased endurance;Impaired balance;Decreased mobility;Decreased cognition;Decreased coordination;Impaired judgement;Decreased safety awareness;Decreased skin integrity;Pain   Assessment Pt agreeable to PT session. PT session focused on bed mobility, transfers, and sitting/standing tolerance. Pt did tolerate treatment well today, but was limited due to fatigue and weakness. Pt was able to perform bed mobility w/ max assist x 2. Pt was able to perform a sit to stand w/ max assist x 3 (b/l HHA, b/l knee blocking, +1 support behind at ischial tuberosities). Pt received 80% ROM. Pt performed LE exercises at EOB. Pt was able to tolerate standing in the [facility] bed at 30 degrees for 10 minutes. Pt was able to communicate using head nods and hand gestures. Pt would continue to benefit from skilled PT. Pt will continue to be seen upon admission. Pt's prognosis is fair secondary to age, PLOF, and medical complications. The patient's AM-[facility] Basic Mobility Inpatient Standardized Score is less than 42.9, suggesting this patient may benefit from discharge to post-acute rehabilitation services. Please also refer to the recommendation of the Physical Therapist for safe discharge planning.   Goals   Patient Goals to rest   LT Expiration Date 04/02/24   Plan   Treatment/Interventions Functional transfer training;LE strengthening/ROM;Therapeutic exercise;Endurance training;Cognitive reorientation;Patient/family training;Equipment eval/education;Bed  mobility;Gait training;Spoke to nursing;OT;Family   Progress Slow progress, decreased activity tolerance   PT Frequency 2-3x/wk   Discharge Recommendation   Rehab Resource Intensity Level, PT I (Maximum Resource Intensity)   AM-PAC Basic Mobility Inpatient   Turning in Flat Bed Without Bedrails 1   Lying on Back to Sitting on Edge of Flat Bed Without Bedrails 1   Moving Bed to Chair 1   Standing Up From Chair Using Arms 1   Walk in Room 1   Climb 3-5 Stairs With Railing 1   Basic Mobility Inpatient Raw Score 6   Turning Head Towards Sound 2   Follow Simple Instructions 2   Low Function Basic Mobility Raw Score  10   Low Function Basic Mobility Standardized Score  14.65   [facility] Highest Level Of Mobility   -HLM Goal 2: Bed activities/Dependent transfer   -HLM Achieved 3: Sit at edge of bed     Karlene Mata, SPT

## 2024-03-22 NOTE — PROGRESS NOTES
NEPHROLOGY PROGRESS NOTE   Carla Hunt 59 y.o. female MRN: [id number]  Unit/Bed#: MICU 12 Encounter: [id number]  Reason for Consult: Acute kidney injury    Assessment/Plan:  Acute kidney injury, etiology multifactorial.  Likely component due to ATN given hemodynamic fluctuations, worsening anemia, episodic ELIESER, etc.  Additional component could be due to Bactrim use with impaired creatinine secretion.  Creatinine slightly improved at 1.33,  remains nonoliguric.  Urine chloride 35, urine sodium 40  Previous urine dipstick negative with 4-10 white cells, 1-2 red cells.  Metabolic acidosis with recent VBG showing a pH of 7.37, pCO2 32, HCO3 of 18.  Improved after sodium bicarb infusion.    Acute on chronic anemia status post PRBC transfusion, serum and urine urine electrophoresis negative for monoclonal gammopathy  Stomal site bleeding status post bedside procedure with control of bleeding  Hypoxic respiratory failure currently remains vent dependent, status post tracheostomy etiology multifactorial given COVID-19 pneumonia with superimposed bacterial component  Recent volvulus status post hemicolectomy with cecal ostomy with noted stomal retraction  B-cell lymphoma, previously on rituximab therapy.  Encephalopathy with recent subarachnoid hemorrhage and history of seizure disorder      Renal function overall has continued to improve with a creatinine of 1.33 remains nonoliguric with a urine output of 1.6 L  Acidosis remains stable with a recent bicarbonate level of 20 May be component due to Topamax.    No changes from nephrology standpoint    SUBJECTIVE:  Seen and examined.  Patient comfortable appearing remains on trach collar.    Review of Systems    OBJECTIVE:  Current Weight: Weight - Scale: 78.1 kg (172 lb 2.9 oz)  Vitals:    03/22/24 0300 03/22/24 0323 03/22/24 0400 03/22/24 0600   BP:       BP Location:       Pulse: (!) 110  (!) 120    Resp: 15  17    Temp:   98.5 °F (36.9 °C)    TempSrc:   Oral    SpO2:  98% 99% 98%    Weight:    78.1 kg (172 lb 2.9 oz)   Height:           Intake/Output Summary (Last 24 hours) at 3/22/2024 1231  Last data filed at 3/22/2024 0400  Gross per 24 hour   Intake 2161.32 ml   Output 1860 ml   Net 301.32 ml       Physical Exam  Cardiovascular:      Rate and Rhythm: Regular rhythm. Tachycardia present.   Pulmonary:      Effort: Pulmonary effort is normal.      Breath sounds: Rhonchi present.   Abdominal:      General: There is no distension.      Palpations: Abdomen is soft.   Musculoskeletal:      Right lower leg: No edema.   Skin:     General: Skin is warm and dry.      Findings: No rash.   Neurological:      Mental Status: She is alert. Mental status is at baseline.         Medications:    Current Facility-Administered Medications:     acetaminophen (TYLENOL) tablet 650 mg, 650 mg, Oral, Q6H PRN, Moises Bowden MD    chlorhexidine (PERIDEX) 0.12 % oral rinse 15 mL, 15 mL, Mouth/Throat, Q12H SARTHAK, Moises Bowden MD, 15 mL at 03/22/24 0823    enoxaparin (LOVENOX) subcutaneous injection 40 mg, 40 mg, Subcutaneous, Q24H SARTHAKJoe DO, 40 mg at 03/22/24 1000    fentaNYL injection 50 mcg, 50 mcg, Intravenous, Q1H PRN, Moises Bowden MD, 50 mcg at 03/21/24 1344    insulin regular (HumuLIN R,NovoLIN R) 1 Units/mL in sodium chloride 0.9 % 100 mL infusion, 0.3-21 Units/hr, Intravenous, Titrated, Joe Lazcano DO, Last Rate: 3 mL/hr at 03/22/24 1055, 3 Units/hr at 03/22/24 1055    ipratropium (ATROVENT) 0.02 % inhalation solution 0.5 mg, 0.5 mg, Nebulization, TID, oMises Bowden MD, 0.5 mg at 03/22/24 0717    lamoTRIgine (LaMICtal) tablet 150 mg, 150 mg, Oral, BID, Moises Bowden MD, 150 mg at 03/22/24 0826    levalbuterol (XOPENEX) inhalation solution 1.25 mg, 1.25 mg, Nebulization, TID, Moises Bowden MD, 1.25 mg at 03/22/24 0717    [COMPLETED] methylPREDNISolone sodium succinate (Solu-MEDROL) injection 50 mg, 50 mg, Intravenous, Q6H SARTHAK, 50 mg at 03/16/24 1830 **FOLLOWED BY** [COMPLETED] methylPREDNISolone sodium  succinate (Solu-MEDROL) injection 40 mg, 40 mg, Intravenous, Q6H SARTHAK, 40 mg at 03/19/24 1756 **FOLLOWED BY** [COMPLETED] methylPREDNISolone sodium succinate (Solu-MEDROL) injection 30 mg, 30 mg, Intravenous, Q8H SARTHAK, 30 mg at 03/21/24 2246 **FOLLOWED BY** [DISCONTINUED] methylPREDNISolone sodium succinate (Solu-MEDROL) injection 20 mg, 20 mg, Intravenous, Q6H SARTHAK **FOLLOWED BY** [DISCONTINUED] methylPREDNISolone sodium succinate (Solu-MEDROL) injection 10 mg, 10 mg, Intravenous, Q6H SARTHAK **FOLLOWED BY** [DISCONTINUED] methylPREDNISolone sodium succinate (Solu-MEDROL) injection 10 mg, 10 mg, Intravenous, Q8H SARTHAK **FOLLOWED BY** [DISCONTINUED] methylPREDNISolone sodium succinate (Solu-MEDROL) injection 10 mg, 10 mg, Intravenous, Q12H SARTHAK **FOLLOWED BY** [DISCONTINUED] methylPREDNISolone sodium succinate (Solu-MEDROL) injection 10 mg, 10 mg, Intravenous, Daily **FOLLOWED BY** methylPREDNISolone sodium succinate (Solu-MEDROL) injection 30 mg, 30 mg, Intravenous, Q12H SARTHAK, 30 mg at 03/22/24 0827 **FOLLOWED BY** [START ON 3/24/2024] methylPREDNISolone sodium succinate (Solu-MEDROL) injection 20 mg, 20 mg, Intravenous, Q12H SARTHAK **FOLLOWED BY** [START ON 3/26/2024] methylPREDNISolone sodium succinate (Solu-MEDROL) injection 10 mg, 10 mg, Intravenous, Q12H SARTHAK **FOLLOWED BY** [START ON 3/28/2024] methylPREDNISolone sodium succinate (Solu-MEDROL) injection 10 mg, 10 mg, Intravenous, Daily, Moises Bowden MD    minocycline (DYNACIN) tablet 200 mg, 200 mg, Per NG Tube, BID, Betzaida Sr MD, 200 mg at 03/22/24 1000    modafinil (PROVIGIL) tablet 100 mg, 100 mg, Per NG Tube, Daily, Moises Bowden MD, 100 mg at 03/22/24 0826    nystatin (MYCOSTATIN) powder, , Topical, BID, Moises Bowden MD, Given at 03/22/24 0831    ondansetron (ZOFRAN) injection 4 mg, 4 mg, Intravenous, Q6H PRN, Moises Bowden MD    pantoprazole (PROTONIX) injection 40 mg, 40 mg, Intravenous, Q12H SARTHAK, Moises Bowden MD, 40 mg at 03/22/24 0824    polyethylene glycol (MIRALAX)  packet 17 g, 17 g, Per NG Tube, Daily, Moises Bowden MD, 17 g at 03/22/24 0824    sodium chloride (AYR SALINE NASAL) nasal gel 1 Application, 1 Application, Nasal, Q1H PRN, Moises Bowden MD    sodium chloride (OCEAN) 0.65 % nasal spray 2 spray, 2 spray, Each Nare, Q4H, Moises Bowden MD, 2 spray at 03/22/24 1042    sodium chloride 3 % inhalation solution 4 mL, 4 mL, Nebulization, TID, Moises Bowden MD, 4 mL at 03/22/24 0717    topiramate (TOPAMAX) 6 mg/ml oral suspension 50 mg, 50 mg, Per PEG Tube, BID, Moises Bowden MD, 50 mg at 03/22/24 0824    venlafaxine (EFFEXOR) tablet 12.5 mg, 12.5 mg, Oral, TID With Meals, Moises Bowden MD, 12.5 mg at 03/22/24 0831    Laboratory Results:  Results from last 7 days   Lab Units 03/22/24  0524 03/21/24  1332 03/21/24  0850 03/21/24  0557 03/21/24  0552 03/21/24  0034 03/20/24  1606 03/20/24  1333 03/20/24  1048 03/20/24  0831 03/20/24  0827 03/20/24  0502 03/20/24  0457 03/19/24  0543 03/18/24  0533 03/18/24  0429 03/17/24  0800 03/17/24  0451 03/16/24  0611   WBC Thousand/uL 3.89* 5.30  --   --  4.87  --   --   --  7.09  --   --   --  7.88 9.92  --  8.39  8.39  --  11.78* 9.47   HEMOGLOBIN g/dL 9.8* 9.4* 10.0*  --  9.6* 10.0* 10.4*  --  11.6  --    < >  --  9.0* 9.5*   < > 5.6*  5.6*   < > 6.8* 7.4*   I STAT HEMOGLOBIN g/dl  --   --   --   --   --   --   --   --   --  6.8*  --   --   --   --   --   --   --   --   --    HEMATOCRIT % 28.6* 27.9* 29.9*  --  28.5* 30.0* 30.8*  --  34.5*  --    < >  --  27.6* 28.1*   < > 17.6*  17.6*  --  21.5* 22.2*   HEMATOCRIT, ISTAT %  --   --   --   --   --   --   --   --   --  20*  --   --   --   --   --   --   --   --   --    PLATELETS Thousands/uL 77* 82*  --   --  53*  --   --   --  72*  --   --   --  85* 86*  --  89*  89*  --  124* 139*   POTASSIUM mmol/L 4.0 4.4  --   --  4.3  --   --  4.8  --   --   --  4.9  --  4.7  --  4.1  --  4.3 3.9   CHLORIDE mmol/L 113* 111*  --   --  110*  --   --  112*  --   --   --  108  --  107  --  107  --  105 107   CO2  mmol/L 20* 19*  --   --  21  --   --  15*  --   --   --  18*  --  17*  --  18*  --  19* 18*   CO2, I-STAT mmol/L  --   --   --   --   --   --   --   --   --  15*  --   --   --   --   --   --   --   --   --    BUN mg/dL 77* 77*  --   --  79*  --   --  87*  --   --   --  88*  --  85*  --  81*  --  71* 65*   CREATININE mg/dL 1.33* 1.38*  --   --  1.41*  --   --  1.54*  --   --   --  1.63*  --  1.79*  --  1.89*  --  1.65* 1.65*   CALCIUM mg/dL 8.8 8.3*  --   --  8.3*  --   --  8.7  --   --   --  8.7  --  8.4  --  8.0*  --  8.2* 8.2*   MAGNESIUM mg/dL 2.3  --   --  2.2  --   --   --   --   --   --   --   --  2.4 2.3  --  2.3  --  2.2 2.1   PHOSPHORUS mg/dL 5.9*  --   --  5.4*  --   --   --   --   --   --   --   --  5.2* 4.0  --  4.0  --  3.6 3.6   GLUCOSE, ISTAT mg/dl  --   --   --   --   --   --   --   --   --  118  --   --   --   --   --   --   --   --   --     < > = values in this interval not displayed.

## 2024-03-22 NOTE — RESPIRATORY THERAPY NOTE
Resp care   03/22/24 0809   Respiratory Assessment   Resp Comments Pt placed on 40% TC. sx for small amt old blood. Cuff deflated. Sat 95%. vest ordered by resp yesterday but never started. spoke with MD. Do not do vest due to abdominal issues and bleeding. Will try finger occlusion to see if pt qualifies for PMV. Will try flutter per MD. Assume pt may be too weak for flutter.

## 2024-03-22 NOTE — RESPIRATORY THERAPY NOTE
RT Ventilator Management Note  Carla Hunt 59 y.o. female MRN: [id number]  Unit/Bed#: [facility] 12 Encounter: [id number]      Daily Screen         3/20/2024  0731 3/21/2024  0713          Patient safety screen outcome:: Passed Passed      Not Ready for Weaning due to:: -- --                Physical Exam:          Resp Comments: (P) pt suctioned orally with nt catheter for copious amounts of red thick secretions.  rn aware. no changes made to vent at this time.  will continue to monitor per protocol.

## 2024-03-22 NOTE — OCCUPATIONAL THERAPY NOTE
Occupational Therapy Progress Note     Patient Name: Carla Hunt  Today's Date: 3/22/2024  Problem List  Principal Problem:    Acute respiratory failure with hypoxia (HCC)  Active Problems:    Anxiety state    Encephalopathy    COVID    Stage 3b chronic kidney disease (CKD) (HCC)    Teto marginal zone B-cell lymphoma (HCC)    Seizure disorder (HCC)    Hypotension    Palliative care patient    Goals of care, counseling/discussion    Acute kidney injury (HCC)    Cecal volvulus (HCC)            03/22/24 1443   OT Last Visit   OT Visit Date 03/22/24   Note Type   Note Type Treatment   Pain Assessment   Pain Assessment Tool FLACC   Pain Rating: FLACC (Rest) - Face 0   Pain Rating: FLACC (Rest) - Legs 0   Pain Rating: FLACC (Rest) - Activity 0   Pain Rating: FLACC (Rest) - Cry 0   Pain Rating: FLACC (Rest) - Consolability 0   Score: FLACC (Rest) 0   Pain Rating: FLACC (Activity) - Face 0   Pain Rating: FLACC (Activity) - Legs 0   Pain Rating: FLACC (Activity) - Activity 0   Pain Rating: FLACC (Activity) - Cry 0   Pain Rating: FLACC (Activity) - Consolability 0   Score: FLACC (Activity) 0   Restrictions/Precautions   Weight Bearing Precautions Per Order No   Braces or Orthoses Other (Comment)  (B/L resting hand splints)   Other Precautions Cognitive;Multiple lines;Telemetry;Fall Risk;Contact/isolation;Airborne/isolation  (trach collar)   Lifestyle   Autonomy I adls and mobility - i iadls - shares homemaking with family   Reciprocal Relationships supportive family   Service to Others volunteers   Intrinsic Gratification Enjoys spending time with her family   Bed Mobility   Supine to Sit 2  Maximal assistance   Additional items Assist x 2;HOB elevated   Sit to Supine 2  Maximal assistance   Additional items Assist x 2   Transfers   Sit to Stand 2  Maximal assistance   Additional items Assist x 3   Stand to Sit 2  Maximal assistance   Additional items Assist x 3   ROM- Right Upper Extremities   R Shoulder  "PROM;Flexion;Extension   R Elbow PROM;Elbow flexion;Elbow extension   R Wrist Prolonged stretch;Wrist flexion;Wrist extension;Radial deviation;Ulnar deviation   R Hand PROM;Prolonged stretch;Thumb;Index finger;Long finger;Ring finger;Little finger   R Position Seated  (EOB)   ROM - Left Upper Extremities    L Shoulder PROM;Flexion;Extension   L Elbow PROM;Elbow flexion;Elbow extension   L Wrist Prolonged stretch;Wrist flexion;Wrist extension;Radial deviation;Ulnar deviation   L Hand PROM;Prolonged stretch;Thumb;Index finger;Long finger;Ring finger;Little finger   L Position Seated  (EOB)   Cognition   Overall Cognitive Status Impaired   Arousal/Participation Responsive;Arousable;Cooperative   Attention WILL   Orientation Level Unable to assess   Memory Unable to assess   Following Commands Follows one step commands with increased time or repetition   Activity Tolerance   Activity Tolerance Patient limited by fatigue   Medical Staff Made Aware PT, restorative, nsg present   Assessment   Assessment Pt was seen for skilled OT treatment session on 3/22/24, focusing on sitting/standing tolerance and balance to promote safe engagement in functional tasks, endurance/activity tolerance, and PROM to promote functional use of B/L UEs. Pt found supine in bed, agreeable to session. Pt required max A x2 to sit EOB, requiring mod Ax1 to maintain upright posture while sitting. Pt performed 1 sts with max A x3. Pt then engaged in PROM stretching of B/L UEs while EOB, being able to make a \"thumbs up\" with R+L hands. Pt was returned supine in bed, and was angled upwards using Kreg bed. Pt was angled 30 degrees upright, remained upright for 5 minutes and returned supine in bed, HOB elevated. BP and HR monitored throughout session, remained WNL.   Plan   Treatment Interventions Functional transfer training;Endurance training;Continued evaluation;Energy conservation;Activityengagement;UE strengthening/ROM   Goal Expiration Date 04/02/24 "   OT Treatment Day 13   OT Frequency 3-5x/wk   Discharge Recommendation   Rehab Resource Intensity Level, OT II (Moderate Resource Intensity)   AM-PAC Daily Activity Inpatient   Lower Body Dressing 1   Bathing 1   Toileting 1   Upper Body Dressing 1   Grooming 2   Eating 2   Daily Activity Raw Score 8   AM-PAC Applied Cognition Inpatient   Following a Speech/Presentation 1   Understanding Ordinary Conversation 2   Taking Medications 1   Remembering Where Things Are Placed or Put Away 1   Remembering List of 4-5 Errands 1   Taking Care of Complicated Tasks 1   Applied Cognition Raw Score 7   Applied Cognition Standardized Score 15.17   End of Consult   Patient Position at End of Consult Supine;All needs within reach   Nurse Communication Nurse aware of consult   Ian Epps

## 2024-03-22 NOTE — CASE MANAGEMENT
Case Management Discharge Planning Note    Patient name Carla Hunt  Location MICU 12/MICU 12 MRN [id number]  : 1965 Date 3/22/2024       Current Admission Date: 1/10/2024  Current Admission Diagnosis:Acute respiratory failure with hypoxia (HCC)   Patient Active Problem List    Diagnosis Date Noted    Acute kidney injury (HCC) 2024    Cecal volvulus (HCC) 2024    Palliative care patient 2024    Goals of care, counseling/discussion 2024    Hypotension 2024    Seizure disorder (HCC) 2024    Acute respiratory failure with hypoxia (HCC) 2024    Transaminitis 2024    Teto marginal zone B-cell lymphoma ([facility]) 2024    COVID 2024    Stage 3b chronic kidney disease (CKD) ([facility]) 2024    Abnormal CT of the head 2024    Nephrolithiasis 2021    Encephalopathy 2020    Other specified anxiety disorders 2019    Reactive depression 2019    Hidradenitis suppurativa of left axilla 2019    Anxiety state 2018    Disorder of parathyroid gland ([facility]) 2018    Eczema 2018    Grand mal status ([facility]) 2018    Hypercalcemia 2018    Hypertensive disorder 2018    Open wound of hand except fingers 2018    Otitis externa 2018    Overweight 2018    Pain in face 2018    Skin sensation disturbance 2018    Subjective visual disturbance 2018    Long term current use of hormonal contraceptive 10/23/2018    Rosacea 2015      LOS (days): 72  Geometric Mean LOS (GMLOS) (days):   Days to GMLOS:     OBJECTIVE:  Risk of Unplanned Readmission Score: 30.86         Current admission status: Inpatient   Preferred Pharmacy:   [id number] - [address] [place] [address]  [place] [address]  Phone: [phone] Fax: [phone]    [facility] - [address]  [address]  Phone: [phone] Fax: [phone]    Primary Care Provider: Tony Guevara MD    Primary Insurance: [employer]  Secondary Insurance: MEDICARE    DISCHARGE DETAILS:         Treatment Team Recommendation: Acute Rehab, Short Term Rehab     Additional Comments:  unable to ask patient if she would like short term rehabilitation. CM sent referrals for STR and acute rehab via aidin. Patient not going to be medically stable for another couple of weeks according to provider.

## 2024-03-22 NOTE — PROGRESS NOTES
[facility]  Progress Note  Name: Carla Hunt I  MRN: [id number]  Unit/Bed#: MICU 12 I Date of Admission: 1/10/2024   Date of Service: 3/22/2024 I Hospital Day: 72    Assessment/Plan   Teto marginal zone B-cell lymphoma (HCC)  Assessment & Plan  Previously on maintenance Rituxan therapy. Last treatment in December 2023    Encephalopathy  Assessment & Plan  Improved wakefulness  provigil 100mg QAM started on 3/19    Anxiety state  Assessment & Plan  Continue Effexor per primary  Patient does not appear anxious during time of encounter    * Acute respiratory failure with hypoxia (HCC)  Assessment & Plan  Patient was re-intubated 3/11 with subsequent bedside trach on 3/12.   Very careful steroid wean  per critical care team.   On trach collar during time of encounter.    Palliative care will return on Monday 3/25 page sooner with questions or concerns.       Interval history:       Patient had another episode of bleeding from ostomy yesterday afternoon. Quickly controlled. 2 units of PRBC ordered. During time of encounter patient is on trach collar, alert, participating with PT/OT and tolerating it seemingly well.    MEDICATIONS / ALLERGIES:     all current active meds have been reviewed    No Known Allergies    OBJECTIVE:    Physical Exam  Physical Exam  HENT:      Head: Atraumatic.   Eyes:      Conjunctiva/sclera: Conjunctivae normal.   Cardiovascular:      Rate and Rhythm: Normal rate.   Pulmonary:      Comments: Trach collar O2  Abdominal:      Tenderness: There is no guarding.      Comments: NGT in place   Musculoskeletal:         General: No swelling.   Skin:     General: Skin is warm and dry.   Neurological:      Mental Status: She is alert.      Comments: Alert, working with PT.OT sitting on EOB   Psychiatric:      Comments: calm         Lab Results:   Results from last 7 days   Lab Units 03/22/24  0524 03/21/24  1332 03/21/24  0850 03/21/24  0552 03/20/24  0457  03/19/24  0543 03/18/24  0533 03/18/24  0429   WBC Thousand/uL 3.89* 5.30  --  4.87   < > 9.92  --  8.39  8.39   HEMOGLOBIN g/dL 9.8* 9.4* 10.0* 9.6*   < > 9.5*   < > 5.6*  5.6*   I STAT HEMOGLOBIN   --   --   --   --    < >  --   --   --    HEMATOCRIT % 28.6* 27.9* 29.9* 28.5*   < > 28.1*   < > 17.6*  17.6*   HEMATOCRIT, ISTAT   --   --   --   --    < >  --   --   --    PLATELETS Thousands/uL 77* 82*  --  53*   < > 86*  --  89*  89*   MONO PCT %  --   --   --   --   --  1*  --  0*   EOS PCT %  --   --   --   --   --  0  --  0    < > = values in this interval not displayed.     Results from last 7 days   Lab Units 03/22/24  0524 03/21/24  1332 03/21/24  0552 03/20/24  1333 03/20/24  0831 03/20/24  0502 03/19/24  0543   POTASSIUM mmol/L 4.0 4.4 4.3   < >  --    < > 4.7   CHLORIDE mmol/L 113* 111* 110*   < >  --    < > 107   CO2 mmol/L 20* 19* 21   < >  --    < > 17*   CO2, I-STAT mmol/L  --   --   --   --  15*  --   --    BUN mg/dL 77* 77* 79*   < >  --    < > 85*   CREATININE mg/dL 1.33* 1.38* 1.41*   < >  --    < > 1.79*   CALCIUM mg/dL 8.8 8.3* 8.3*   < >  --    < > 8.4   ALK PHOS U/L  --   --   --   --   --   --  141*   ALT U/L  --   --   --   --   --   --  64*   AST U/L  --   --   --   --   --   --  29   GLUCOSE, ISTAT mg/dl  --   --   --   --  118  --   --     < > = values in this interval not displayed.       Imaging Studies: reviewed pertinent studies   EKG, Pathology, and Other Studies: reviewed pertinent studies    Counseling / Coordination of Care    Total floor / unit time spent today 25 minutes. Greater than 50% of total time was spent with the patient and / or family counseling and / or coordination of care. A description of the counseling / coordination of care: time spent at bedside providing support to spouseMin.

## 2024-03-22 NOTE — RESPIRATORY THERAPY NOTE
RT Ventilator Management Note  Carla Hunt 59 y.o. female MRN: [id number]  Unit/Bed#: [facility] 12 Encounter: [id number]      Daily Screen         3/20/2024  0731 3/21/2024  0713          Patient safety screen outcome:: Passed Passed      Not Ready for Weaning due to:: -- --                Physical Exam:   Assessment Type: (P) Assess only  General Appearance: (P) Sleeping  Respiratory Pattern: (P) Assisted, Spontaneous  Chest Assessment: (P) Chest expansion symmetrical  Bilateral Breath Sounds: (P) Diminished, Coarse  Cough: (P) Productive  Suction: (P) Trach  O2 Device: (P) Ventilator      Resp Comments: (P) Pt. remains on CPAP/PS mode.  No changes throughout the night.  Will comntinue to monitor pt per protocol.

## 2024-03-22 NOTE — PROGRESS NOTES
Progress Note - Infectious Disease   Carla Hunt 59 y.o. female MRN: [id number]  [facility]: [id number]      Impression/Plan:    1. Acute hypoxic respiratory failure, likely multifactorial, with both COVID and bacterial pneumonia contributing.  Also consider persistent inflammation, fibrosis as seems quite steroid responsive in past.  Most recently extubated 3/1.  Patient with worsening respiratory status requiring intubation again 3/11.  Suspect ongoing hypoxia related to persistent COVID-pneumonia, superimposed bacterial infection, inflammatory component/lung fibrosis related to COVID, now with profound deconditioning and muscle weakness.  Status post bronchoscopy and trach 3/12 with excessive secretions seen from the lower lobes bilaterally.  BAL culture from 3/11 shows heavy growth of Stenotrophomonas maltophilia.  PJP DFA negative. While the patient has grown this before and she certainly may be colonized, given worsening CT findings, intubation and prolonged steroids would treat as a true pathogen.  Status post 10 days of vancomycin and cefepime.  Started on Bactrim 3/13, switched to minocycline 3/18.  Patient completed 14-day course of remdesivir/Paxlovid 3/15.  CRP improving. Status post repeat bronchoscopy 3/17 for airway clearance with continued thick secretions.  Low concern for uncontrolled infection contributing to clinical picture. Respiratory status improving, patient now on trach collar. No fevers.   -Continue p.o. minocycline 200 mg twice daily via NG tube.  Tube feeds should be held for 1 hour prior to and 1 hour after minocycline administration. Recommend a 14 day course through 3/27/24  -Steroid dosing per critical care, tolerating weaning   -No indication to treat Candida in respiratory culture, this is respiratory colonization  -If clinically deteriorates with concern for progressive sepsis would add meropenem 1g IV Q8 to the Bactrim  -Fungitell is positive but low  clinical concern for invasive fungal infection at this time; recent antibiotics, blood transfusion, candida in sputum may be causing false positive result.  The patient has been improving without any antifungal therapy and empiric therapy raises risk of further affecting normal lauren.  Will continue to monitor.  -For completeness, check histoplasma urine antigen     2. SIRS versus sepsis.  Fluctuating fever, WBC.  Fevers may be multifactorial due to COVID (still positive antigen and PCR) versus inflammation (seem to correlate to steroid dosing).  Also consider developing bacterial infection.  Recent blood cultures 2/26 negative.  CT C/A/P 3/10 shows stable groundglass and nodular opacities, inflammation around stoma. Now with dehiscence of her abdominal wound following staple removal, status post wound VAC placement 3/13. Patient afebrile, without leukocytosis, weaning on trach collar  -antibiotics as above  -Follow temperatures closely  -Recheck WBC in AM to monitor infection  -Supportive care as per the primary service     3. Recurrent bacterial pneumonia.  The patient has completed multiple treatment courses over the hospitalization.  Most recent BAL culture with Pseudomonas and stenotrophomonas.  Unclear if pathogens or colonizers.  Status post 10 days levofloxacin.  CT C/A/P showed evolving changes likely all due to sequelae of COVID, infections.  Noted to have worsening hypoxia and purulent secretions on bronchoscopy 3/11.  BAL culture with heavy growth of Stenotrophomonas maltophilia, Candida              -Antibiotics as above              -Wean O2 as able     4. Severe COVID, present on admission.  Patient was treated with a 10-day course of remdesivir, dexamethasone and was given 1 dose of Tocilizumab on admission.  COVID antigen was negative prior to coming off isolation.  Had positive COVID PCR from BAL 2/16, status post another 5-day course of remdesivir.  Patient has remained on high-dose systemic  corticosteroid throughout her hospitalization which were recently intensified 2/26 for fevers.  Patient having persistent fevers, hypoxia.  COVID antigen positive and PCR is also positive 3/3 and Ct 26.8, consistent with high viral replication.  Repeat PCR positive with Ct now closer to 30. CRP overall decreasing with slow steroid wean.  Status post 14-day course of remdesivir/Paxlovid 3/15/2024  -Steroid wean per ICU  -No additional antivirals indicated  -Recommend repeat COVID PCR 3/25     5. 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  Patient is status post exploratory laparotomy with ileocecectomy and end ileostomy creation 2/20.   End ileostomy is with ongoing ischemic changes which is likely contributing to the imaging findings and ongoing SIRS response.  Now with wound dehiscence status post staple removal, status post wound VAC placement 3/13, removed but replaced today due to bleeding  -Serial exams  -Surgery follow-up   -no current signs of infection, need for antibiotics      B-cell lymphoma, on maintenance rituxan, which is currently postponed.  Rituximab is a risk factor for persistent COVID infection.    7.  ELIESER.  More likely due to hypovolemia since creatinine was increasing prior to starting Bactrim.   -Monitor creatinine closely   -Dose adjust antibiotics as needed    8.  Anemia, thrombocytopenia, lymphopenia.  Patient has had persistent lymphopenia throughout this admission, likely due to rituximab, viral infection, high-dose steroids.  Now with worsening anemia and thrombocytopenia.  Bactrim discontinued 3/18. CMV PCR negative. Hgb now stable after transfusion   -Steroid wean per primary   -Transfusion support per primary   -Consider hematology consult    Above management plan to continue p.o. minocycline discussed with the critical care team who is in agreement. ID will see again 3/25/2024, please call with questions.    Antibiotics:  Day 5 minocycline (day 10  total)    Subjective:  The patient was transitioned to trach collar yesterday.  She is having some thick secretions but has a strong cough.  She wakens to voice, is able to nod and follow commands.  No fevers.    Objective:  Vitals:  Temp:  [97.5 °F (36.4 °C)-98.6 °F (37 °C)] 98.5 °F (36.9 °C)  HR:  [107-135] 120  Resp:  [12-23] 17  SpO2:  [93 %-99 %] 98 %  Temp (24hrs), Av.1 °F (36.7 °C), Min:97.5 °F (36.4 °C), Max:98.6 °F (37 °C)  Current: Temperature: 98.5 °F (36.9 °C)    Physical Exam:   General Appearance:  Ill-appearing, lethargic but now awakens to voice   Neck: Trach in place.   Lungs:   Rhonchi bilaterally   Heart:  RRR; no murmur, rub or gallop   Abdomen:   Midline wound dehiscence, guaze in place without active bleeding   Extremities: No edema   Skin: No new rashes or lesions.        Labs:   All pertinent labs and imaging studies were personally reviewed  Results from last 7 days   Lab Units 24  0524 24  1332 24  0850 24  0552   WBC Thousand/uL 3.89* 5.30  --  4.87   HEMOGLOBIN g/dL 9.8* 9.4* 10.0* 9.6*   PLATELETS Thousands/uL 77* 82*  --  53*     Results from last 7 days   Lab Units 24  0524 24  1332 24  0552 24  1333 24  0831 24  0502 24  0543   SODIUM mmol/L 145 141 142   < >  --    < > 138   POTASSIUM mmol/L 4.0 4.4 4.3   < >  --    < > 4.7   CHLORIDE mmol/L 113* 111* 110*   < >  --    < > 107   CO2 mmol/L 20* 19* 21   < >  --    < > 17*   CO2, I-STAT mmol/L  --   --   --   --  15*  --   --    BUN mg/dL 77* 77* 79*   < >  --    < > 85*   CREATININE mg/dL 1.33* 1.38* 1.41*   < >  --    < > 1.79*   EGFR ml/min/1.73sq m 44 42 41   < >  --    < > 30   GLUCOSE, ISTAT mg/dl  --   --   --   --  118  --   --    CALCIUM mg/dL 8.8 8.3* 8.3*   < >  --    < > 8.4   AST U/L  --   --   --   --   --   --  29   ALT U/L  --   --   --   --   --   --  64*   ALK PHOS U/L  --   --   --   --   --   --  141*    < > = values in this interval not  displayed.           Results from last 7 days   Lab Units 03/21/24  1232 03/19/24  0543 03/18/24  0429 03/17/24  0451 03/16/24  0611   CRP mg/L 226.8* 41.7* 38.2* 78.9* 13.4*           Results from last 7 days   Lab Units 03/20/24  0457   D-DIMER QUANTITATIVE ug/ml FEU 1.92*           Imaging:  CXR personally reviewed and shows mild improvement in multifocal bronchopneumonia

## 2024-03-22 NOTE — PROGRESS NOTES
[facility]  Progress Note: Critical Care  Name: Carla Hunt 59 y.o. female I MRN: [id number]  Unit/Bed#: MICU 12 I Date of Admission: 1/10/2024   Date of Service: 3/22/2024 I Hospital Day: 72    Assessment/Plan   Acute hypoxic respiratory failure;; s/p tracheostomy 3/12; intermittently requiring vent  Bilateral ground glass opacities, persistent, improving  Persistent COVID-19 infection; s/p 14d course of antivirals completed 3/15, superimpoved by #4  Stenotrophomonas pneumonia; recurrent, previously on Bactrim, switched to minocycline 2/2 #7 through 3/26  Acute on chronic anemia- multifactorial-blood loss from ostomy site now resolved s/p suturing of bleeding vessel, chronic inflammation, iatrogenic, marrow dysfunction in setting of persistent inflammation likely also contributing  Severe Metabolic encephalopathy, multifactorial including #8, improving  ELIESER, pre-renal, on CKD3, improving  Uremia  Thrombocytopenia; s/p 1U plt, improving  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; complicated by #12  Wound dehiscence 2/2 poor wound healing s/p wound vac 3/13   B-Cell lymphoma, s/p chemotheraphy 2022, Rituxan maintenance therapy on hold  Minimal SAH POA, no interventions required, resolved on repeat CTHS  Seizure disorder; on home AEDs  Steroid induced hyperglycemia; controlled on insulin gtt  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  -Most recent repeat CTH 3/13 negative for acute intracranial findings.  Has had extensive neurological workup  including MRI/CT neuroimaging, LP with unremarkable findings  -Etiology unclear. Has been off sedation. Electrolytes, sodium, BS at goal. Ammonia normal. May be prolonged 2/2 PNA, possibly worsened by uremic encephalopathy worsened by ELIESER, ?UGIB, however GFR not <15; candida growth seen on BAL Cx from 3/11, may be respiraotry colonization, however fungal infection not ruled in setting of severe lymphopenia, depressed immunoglobulins.      Plan:  -Avoid PRN fentanyl/sedative meds as able.  -Continue modafinil  100mg qAM to stimulate arousal per palliative    -Continue to wean steroids more aggressively as planned, on 30mg BID today, 20mg BID tomorrow  -ABX tx with PNA as below  -Frequent neurochecks   -Can consider MRI brain to evaluate for eval for encephalitis/inflammatory process, however may not   -Video EEG deferred at thist time due to low suspicion for seizures. Less likely non-convulsive status epilepticus as patient is on home AEDs.     Diagnosis: seizure disorder, known history  -S/p partial left temporal lobe resection in 2007 2/2 ?complex migraines  -On Lamictal 150 bid and Topamax 50mg bid  -Last lamotrigine level from 03/02 at 10.7 (therapeutic)     Diagnosis: Anxiety, known history  -On Effexor 12.5 mg TID     CV:  -Diagnosis: Sinus tachycarida  -In setting of blood loss anemia/hypovolemia, improving with PRBC transfusions and IVF  -TTE 3/17 with no major structural dysfunction  -May be 2/2 dehydration/hypvol, acute on chronic anemia, underlying infectious process, pain, less likely Modafinal-induced as patient was persistently tachy prior   Plan:  - See plans under Pulm, Heme/Onc, ID        Pulm:  Diagnosis: Acute hypoxic respiratory failure  Diagnosis: Bilateral ground glass opacities, persistent  -Vent dependent; s/p trach 3/12 after was reintubated 3/11 due to increased WOB,elevated CRP  -Persistently COVID+ s/p multiple courses of antivirals (most recent completed 3/15).  Complicated by recurrent bacterial PNA treated w 10d levaquin (completed 2/25) for MDR PNA; most recent BAL +recurrent stenotrophomona treated with Bactrim, swithced to minocyline today 2/2 ELIESER  BAL also with 4+ candida albican; serum fungitell elevated  -Etiology Likely multifactorial including possible COVID pneumonitis vs recurrent PNA vs hypersensitivity pneumonitis 2/2 ?rituxumab. Persistent inflammation likely given patient has been steroid responsive throughout admission. CRP continues downward trend. Lack of clinical improvement despite steroids, ABx, antivirals. Fungal source not ruled out in setting of severe lymphopenia, low immunoglobulins.  -Repeat CXR imaging 3/20 with improvement in multifocal bronchopneumonia    Plan:  -Finished 14d course of Paxlovid/remdesivir on 3/15  -Continue minocycline 200mg bid via NGT to tx Stenotrophomonas PNA as below  -Steroid wean- IV Solu-Medrol 30 mg bid today, 20mg  bid tomorrow  -Will hold off on starting empiric antifungal given improvement in neurological exam however may be consideration if clinical status does not improve.  -Follow up BAL viral negative  -Follow up anti-Rituximab ab  -Wean PS ventilation, currently on 12/6/40%- attempt SBT/trach collar today        GI:  Diagnosis: Partial cecal volvulus s/p right hemicolectomy with ostomy pouch in place  -Complicated by poor wound healing of midline incision as evidenced by wound dehiscence s/p wound vac that was removed 3/17 by gen surg.  -Stoma with dark brown output, consistent with prior  Plan:  -Wound vac as per general surgery  -Monitor ostomy pouch output  -Surgery following, will keep them updated if any further changes     :  Diagnosis: ELIESER on CKD III  -Baseline Cr  0.8-1.2  -Cr remains elevated but improving today, Cr 1.43, BUN remains elevated but improving  -UO adequate, on Ortiz, draining clear yellow urine  -Prerenal azotemia 2/2 hypovolemia, anemia-?blood loss, likely exacerbated by bactrim use  (increased Cr secretion), however remains off Bactrim x2d with improvement in serum creatinine  Plan:  -Improving, s/p IVF, s/p prbc transfusions for blood loss anemia on 3/19, 3/21; Hgb stable  -Bactrim switched to minocycline, will continue  -Trend daily BMP  -Continue to monitor I/O and UOP  -Avoid nephrotoxins, contrast dye as able     Diagnosis: NAGMA,   -Cl normal, positive urine gap. No loose stools/diarrhea reported  -Ddx include 2/2 uremic acidosis and ELIESER vs RTA2 from Topamax given positive UG. Unlikely RTA1 given absence of hypokalemia and normal serum Cl  Plan:  -s/p sodom bicarb drip overnight, NAGMA currently resolved  -Bactrim switched to minocycline, day 2, with improvement in serum Cr  -Treat underlying uremia, planned EGD next week as per GI to rule out UGIB  -Optimize electrolytes including potassium/monitor hypokalemia  -?  Consider discontinuation of topiramate  -Trend bmp daily     F/E/N:  F: none  E: monitor and replete for goal K>4, P>3, Mg>2  N: tube feeds with vital 1.5; 45 cc/h, Prosource liquid protein to 1 packet BID     Heme/Onc:  Diagnosis: Acute on chronic anemia  -Baseline Hgb ~7-8. S/P 2U PRBCs 3/17 after repeat Hgb 5.3, total of 6U transfused since admission.  -Patient had significant blood loss from ostomy site 3/20, resulting in hemorraghic shock, improved with blood transfusion; hemostasis achieved after bleeding source identified and sutured. Another large vol danie bloody output from osotomy on 3/21, bleeding source within ostomy site, sutured. S/P 1U prbc.  Repeat H&H stable.   -?Worsened by impaired marrow response liekly 2/2 persistent inflammation due to low retic index ~1 prior to most recent transfusions; normocytic with normal B12/folate levels; MCV<100. Iatrogenic cause likely contributing 2/2 frequent blood draws; chronic anemia likely persistent inflammatory state/CKD/lymphoma in setting of elevated ferritin of 974. SPEP/UPEP negative. Hemolysis labs negative.    Plan:  -Routine monitoring ostomy output, if bleeding, apply direct pressure and contact gen surg stat.  -Continue tube feeds/nutritional support  ·Trend CBC, transfuse to maintain Hgb>7     Diagnosis: Thrombocytopenia  -Persistent thrombocytopenia likely in setting of infectious state, known history of B-cell lymphoma.  May also have component of marrow dysfunction in setting of persistent inflammatory state. No objective evidence of liver dysfunction. No known history of von Willebrand's disease.  -Is on heparin product however 4T score suggestive of intermiediate probability of HIT at 14%  Plan:  -Treat underlying anemia as per plan noted above  -Lovenox for DVT ppx     Diagnosis Lymphopenia  -In setting of high dose steroids  -Severely depressed immunoglobulins inclding IgG, IgA, Igm  -At risk for further infections  -Contact and airborne precautions  -Follow up CD4 count     Diagnosis: B cell lymphoma  -Follows with heme[facility]c at [facility]  -S/P 6 cycles of chemotherapy in 20222  -Maintained on Rituxan for 2 year course PTA, started May 2022, last dose was 12/2024, treatment currently on hold in setting of persistent COVID-19 infection  Plan:  -Holding rituximab in setting of persistent COVID-19 infection  -Follow-up CD4 count and percentage  -Follow up anti-Rituximab ab to assess for drug induced hypersensitivity syndrome  -Lymphoma/leukemia flow cytometry      DVT ppx: with lovenox     Endo:  Diagnosis: steroid hyperglycemia and diabetes mellitus type 2, A1c at 6.6 from 01/2024  -Goal -180  -BG range last 24hrs 113-174  Plan:  -On insulin gtt     ID:  Diagnosis: Recurrent bacterial pneumonia  - Patient has completed multiple treatment courses of remdesivir throughout hospitalization in addition to 7 days of ceftriaxone.  - BAL cultures in 2/2024 positive for MDR Pseudomonas and stenotrophomonas treated with 10d course of Levaquin  - CT C/A/P 3/10 with persistent groundglass opacities and changes suggestive  of sequelae of COVID, prior infections.  Completed 10d course of cefepime for broad spectrum coveragge (completed (3/13)  -Repeat BAL cultures from 3/11 showing 4+ growth Stenotrophomonas maltophilia. Could be colonization given prior BAL growth of same, however due to recent intubation with prolonged corticosteroid theraphy, will begin treating as true pathogen. Patient otherwise remains afebrile, no leukocytosis. CRP with down trending.  Previously on Bactrim from 3/13 to 3/18, discontinued due to worsening ELIESER  Plan:  -Continue minocycline 200mg bid, via NGT; hold TF 1hr pre and post minocycline adminstration.  -Plan to treat for 14d course through 3/26 w/ abx  -IV steroids as above     MSK/Skin:  Diagnosis: Midline incision wound with poor wound healing-  -General surgery performed staple removal of the patient's midline incision on 3/12 with some skin signs appreciated at the inferior aspect of the wound without obvious pus drainage. Overnight 3/13, wound dehiscence progressed requiring general surgery reevaluation. Red/brown aspirate noted, fascia intact. Wet-to-dry dressings in place. General surgery following for next Epson wound care.  -Poor wound healing in setting of high-dose steroid therapy.  No  exam no obvious signs of infection at this time.   -S/P wound vac replacement 3/13 as per gen surgery. No active concerns for cellulitis.  Plan:  -Wound VAC management as per general surgery  -Routine monitoring, wound care as per general surgery.     Diagnosis: Critical illness myopathy  -Likely exacerbated by high-dose steroid therapy  Plan:  -Continue to wean steroids as above  -PT/OT following        Disposition: Critical care       ICU Core Measures     Vented Patient  VAP Bundle  VAP bundle ordered     A: Assess, Prevent, and Manage Pain Has pain been assessed? Yes  Need for changes to pain regimen? Yes   B: Both Spontaneous Awakening Trials (SATs) and Spontaneous Breathing Trials (SBTs) Plan to perform  spontaneous awakening trial today? Yes   Plan to perform spontaneous breathing trial today? Yes   Obvious barriers to extubation? No   C: Choice of Sedation RASS Goal: 0 Alert and Calm  Need for changes to sedation or analgesia regimen? No   D: Delirium CAM-ICU: Positive   E: Early Mobility  Plan for early mobility? Yes   F: Family Engagement Plan for family engagement today? Yes       Antibiotic Review: Patient on appropriate coverage based on culture data.  and Infectious disease consulted    Review of Invasive Devices:    Ortiz Plan: Continue for accurate I/O monitoring for 48 hours    Amelia Plan: Discontinue arterial line    Prophylaxis:  VTE VTE covered by:  enoxaparin, Subcutaneous, 40 mg at 03/21/24 0906       Stress Ulcer  covered bypantoprazole (PROTONIX) injection 40 mg [[id number]]        Significant 24hr Events     24hr events: No acute overnight events     Subjective     Review of Systems   Unable to perform ROS: Acuity of condition        Objective                            Vitals I/O      Most Recent Min/Max in 24hrs   Temp 98.5 °F (36.9 °C) Temp  Min: 97.5 °F (36.4 °C)  Max: 98.6 °F (37 °C)   Pulse (!) 120 Pulse  Min: 107  Max: 135   Resp 17 Resp  Min: 12  Max: 27   /68 BP  Min: 128/68  Max: 128/68   O2 Sat 98 % SpO2  Min: 90 %  Max: 99 %     Vent: 12/6/40%  Trach  Ortiz  Ostomy (RUQ)  Wound vac   Intake/Output Summary (Last 24 hours) at 3/22/2024 0621  Last data filed at 3/22/2024 0400  Gross per 24 hour   Intake 2311.37 ml   Output 2325 ml   Net -13.63 ml       Diet Enteral/Parenteral; Tube Feeding No Oral Diet; Vital 1.5; Continuous; 45; Prosource Protein Liquid - One Packet; OD; 300; Water; Every 6 hours    Invasive Monitoring           Physical Exam   Physical Exam  Eyes:      Pupils: Pupils are equal, round, and reactive to light.   Skin:     General: Skin is warm.   HENT:      Nose: Nasogastric tube present. No epistaxis.      Mouth/Throat:      Mouth: Mucous membranes are moist.   Neck:       Vascular: No JVD.      Trachea: Tracheostomy present.   Cardiovascular:      Rate and Rhythm: Regular rhythm. Tachycardia present.      Heart sounds: No murmur heard.  Musculoskeletal:      Right lower leg: No edema.      Left lower leg: No edema.   Abdominal: General: An ostomy site is present. There is ostomy site.There is no distension.      Palpations: Abdomen is soft.      Tenderness: There is no abdominal tenderness.   Constitutional:       Appearance: She is ill-appearing.   Pulmonary:      Effort: Pulmonary effort is normal. No accessory muscle usage, respiratory distress or accessory muscle usage.      Breath sounds: Normal breath sounds.   Neurological:      Motor: Motor deficit (diffuse weakness).      Comments: Alert, responds to simple commands by nodding, tracks intermittently, withdraws to pain BUE   Genitourinary/Anorectal:  Ortiz present.          Diagnostic Studies      EKG: no new  Imaging: repeat CXR 3/21 with improving findings of multifocal pna I have personally reviewed pertinent reports.       Medications:  Scheduled PRN   chlorhexidine, 15 mL, Q12H SARTHAK  enoxaparin, 40 mg, Q24H SARTHAK  ipratropium, 0.5 mg, TID  lamoTRIgine, 150 mg, BID  levalbuterol, 1.25 mg, TID  methylPREDNISolone sodium succinate, 30 mg, Q12H SARTHAK   Followed by  [START ON 3/24/2024] methylPREDNISolone sodium succinate, 20 mg, Q12H SARTHAK   Followed by  [START ON 3/26/2024] methylPREDNISolone sodium succinate, 10 mg, Q12H SARTHAK   Followed by  [START ON 3/28/2024] methylPREDNISolone sodium succinate, 10 mg, Daily  minocycline, 200 mg, BID  modafinil, 100 mg, Daily  nystatin, , BID  pantoprazole, 40 mg, Q12H SARTHAK  polyethylene glycol, 17 g, Daily  sodium chloride, 2 spray, Q4H  sodium chloride, 4 mL, TID  topiramate, 50 mg, BID  venlafaxine, 12.5 mg, TID With Meals      acetaminophen, 650 mg, Q6H PRN  fentaNYL, 50 mcg, Q1H PRN  ondansetron, 4 mg, Q6H PRN  sodium chloride, 1 Application, Q1H PRN       Continuous    insulin regular  (HumuLIN R,NovoLIN R) 1 Units/mL in sodium chloride 0.9 % 100 mL infusion, 0.3-21 Units/hr, Last Rate: 3 Units/hr (03/22/24 0605)         Labs:    CBC    Recent Labs     03/21/24  1332 03/22/24  0524   WBC 5.30 3.89*   HGB 9.4* 9.8*   HCT 27.9* 28.6*   PLT 82* 77*     BMP    Recent Labs     03/21/24  1332 03/22/24  0524   SODIUM 141 145   K 4.4 4.0   * 113*   CO2 19* 20*   AGAP 11 12   BUN 77* 77*   CREATININE 1.38* 1.33*   CALCIUM 8.3* 8.8       Coags    Recent Labs     03/21/24  1332   INR 1.49*   PTT 40*        Additional Electrolytes  Recent Labs     03/20/24  0831 03/21/24  0557 03/22/24  0524   MG  --  2.2 2.3   PHOS  --  5.4* 5.9*   CAIONIZED 1.40*  --   --           Blood Gas    Recent Labs     03/21/24  1512   PHART 7.374   IPT6MFX 32.6*   PO2ART 81.7   IZE2YYQ 18.6*   BEART -5.8   SOURCE Line, Arterial     Recent Labs     03/21/24  1512   SOURCE Line, Arterial    LFTs  No recent results    Infectious  No recent results  Glucose  Recent Labs     03/20/24  1333 03/21/24  0552 03/21/24  1332 03/22/24  0524   GLUC 186* 132 247* 176*               Joe Lazcano,

## 2024-03-22 NOTE — PROCEDURES
POC Cardiac US    Date/Time: 1/10/2024 7:41 PM    Performed by: Nigel Escobar DO  Authorized by: Nigel Escobar DO    Patient location:  ICU  Other Assisting Provider: No    Procedure details:     Exam Type:  Diagnostic    Indications: suspected volume depletion      Assessment / Evaluation for: intravascular volume status      Exam Type: follow-up exam      Image quality: limited diagnostic    Patient Details:     Cardiac Rhythm:  Regular    Mechanical ventilation: No    IVC findings:     IVC Size: small      IVC Inspiratory Collapse: partial      Maximum IVC Diameter (cm):  1.2    Minimum IVC Diameter (cm):  0.4    IVC Variability (%):  67  Interpretation:     Fluid Status:  Hypovolemic

## 2024-03-22 NOTE — PLAN OF CARE
Problem: PHYSICAL THERAPY ADULT  Goal: Performs mobility at highest level of function for planned discharge setting.  See evaluation for individualized goals.  Description:    Equipment Recommended:  (none)       See flowsheet documentation for full assessment, interventions and recommendations.  Outcome: Progressing  Note: Prognosis: Fair  Problem List: Decreased strength, Decreased range of motion, Decreased endurance, Impaired balance, Decreased mobility, Decreased cognition, Decreased coordination, Impaired judgement, Decreased safety awareness, Decreased skin integrity, Pain  Assessment: Pt agreeable to PT session. PT session focused on bed mobility, transfers, and sitting/standing tolerance. Pt did tolerate treatment well today, but was limited due to fatigue and weakness. Pt was able to perform bed mobility w/ max assist x 2. Pt was able to perform a sit to stand w/ max assist x 3 (b/l HHA, b/l knee blocking, +1 support behind at ischial tuberosities). Pt received 80% ROM. Pt performed LE exercises at EOB. Pt was able to tolerate standing in the KREG bed at 30 degrees for 10 minutes. Pt was able to communicate using head nods and hand gestures. Pt would continue to benefit from skilled PT. Pt will continue to be seen upon admission. Pt's prognosis is fair secondary to age, PLOF, and medical complications. The patient's AM-PAC Basic Mobility Inpatient Standardized Score is less than 42.9, suggesting this patient may benefit from discharge to post-acute rehabilitation services. Please also refer to the recommendation of the Physical Therapist for safe discharge planning.  Barriers to Discharge: None     Rehab Resource Intensity Level, PT: I (Maximum Resource Intensity)    See flowsheet documentation for full assessment.

## 2024-03-22 NOTE — SPEECH THERAPY NOTE
Speech-Language Pathology   Speaking Valve Evaluation    Patient Name: Carla Hunt 59 y.o.  Today's Date: 3/22/2024      Problem List  Principal Problem:    Acute respiratory failure with hypoxia (HCC)  Active Problems:    Anxiety state    Encephalopathy    COVID    Stage 3b chronic kidney disease (CKD) (HCC)    Teto marginal zone B-cell lymphoma (HCC)    Seizure disorder (HCC)    Hypotension    Palliative care patient    Goals of care, counseling/discussion    Acute kidney injury (HCC)    Cecal volvulus (HCC)    Past Medical History  Past Medical History:   Diagnosis Date    Hx of hypercalcemia     Lymphoma (HCC) 2021    Parathyroid disease (HCC)     Seizures (HCC)     Situational depression     24 yr old son  from drug overdose     Past Surgical History  Past Surgical History:   Procedure Laterality Date    CRANIOTOMY FOR TEMPORAL LOBECTOMY Left     OK LAPS ABD PRTM&OMENTUM DX W/WO SPEC BR/WA SPX N/A 2024    Procedure: LAPAROSCOPY DIAGNOSTIC,EXPLORATORY LAPAROTOMY, RIGHT KARY COLECTOMY,ILEOSTOMY, MUCUS FISTULA;  Surgeon: Lauryn Ullrich, DO;  Location: [facility];  Service: General       Summary   Pt is known to ST department from prior evaluations. She was initially admitted 1/10/24, has since had multiple intubations/prolonged intubation and now presents with #8.0 cuffed Shiley trach. Cuff was deflated for evaluation, and pt was seen for potential to use PMV. Finger occlusion trials yielded limited airflow around trach/through the glottis, aphonia, and coughing with occlusion. Cough was not productive. Discussed with physicians, pt may benefit from trach downsize before she is able to tolerate PMV, however it is likely too soon for trach change. Additionally, pt will need MBS when appropriate to determine potential to begin PO (better to complete MBS after PMV is established if possible). She is currently on covid precautions. ST will f/u closely however speech and swallow progress may be  limited until current barriers resolve. May need ENT consult d/t prolonged intubation and lack of voicing.       Current Medical Status per infectious disease 3/22/24  The patient was transitioned to trach collar yesterday. She is having some thick secretions but has a strong cough. She wakens to voice, is able to nod and follow commands. No fevers.   Prolonged intubation/ multiple intubations: 1/10-3/12  Shiley cuffed 8mm placed 3/12  Consult for speaking valve evaluation received and completed bedside.     Initial H&P 1/10/24   Carla Hunt is a 58 y.o. who presents on 1/8 with a complaint of abdominal pain. She has a past medical history of chronic kidney disease stage 3 with baseline creatinine 1-1.3, epilepsy on Topamax and Lamictal, hyperlipidemia, left anterior temproal lobe resection in 2007, a history of B-cell lymphoma on chemotherapy, and recent treatment outpatient for a urinary tract infection. In the emergency room there she was found to be COVID positive with symptoms concerning for stroke. Her initial CT showed a possible subarachnoid however given the size felt okay to be managed at the [facility]. Her neurologic work-up was notable for a possible lacunar infarct and had a negative routine EEG. Despite treatment for both COVID and possible seizures, the patient did not havei mprovement in mental status and worsening respiratory status. She was started on COVID protocol treatments, receiving Decadron/full-strength Lovenox/Actemra on 1/9. She was additionally started on remdesivir. After discussion with neurology, there was high clinical suspicion for intracranial infection  and a lumbar puncture was recommended. She was also started on antibiotics for possible meningitis. Burton mental status continued to worsen and she was referred to the [facility] for video EEG.     Imaging Studies:  XR chest portable ICU 3/20/24  IMPRESSION:  Overall mild improvement in multifocal bronchopneumonia,  likely aspiration related.  XR chest portable ICU 3/14/24  IMPRESSION:  Worsening consolidation throughout the right lung and left lower lobe consistent with multi lobar pneumonia, possibly aspiration.  CT head wo contrast 3/13/24  IMPRESSION:  No acute intracranial pathology. Stable postoperative changes status post left temporal lobectomy. Chronic microangiopathy.  Stable bilateral mastoid effusions, left greater than right and left middle ear effusion.. Stable sinus inflammatory changes.    Baseline Status: :   Behavior/Cognition: decreased attention and low alertness level  Speech/Language Status: able to follow commands inconsistently and no verbal output noted  Patient Positioning: upright in bed  Pain: Appears comfortable on trach collar and no report or indication of pain.     Baseline Oral-Mechanism and Motor Evaluation     Oral Mechanism Exam   Facial: symmetrical  Labial: WFL  Lingual: WFL  Velum: symmetrical  Mandible: adequate ROM  Dentition: adequate  Tracheostomy: Shiley #8 cuffed  Vital Signs at Baseline:RR16,     Assessment with brief digital occlusion:  voicing not achieved      SLP Goals:  1.  Pt will demonstrate improvement in valve tolerance (to tolerance across 8 hr shifts) as evidenced by no increase in work of breathing, RR, sob, or decline in SPO2.  2.  Pt will evidence vocal volume that is within functional limits for needs in ADLs.

## 2024-03-22 NOTE — UTILIZATION REVIEW
"Continued Stay Review    Date: 03/22                          Current Patient Class: IP  Current Level of Care: Level 2 stepdown/HOT    HPI:59 y.o. female initially admitted on 01/10     Assessment/Plan: No acute ovn events. Pt was having some thick secretions but has a strong cough, wakens to voice, able to nod and follow commands. No fevers.  Transitioned to trach collar yesterday, cont to wean as able. Cont Minocycline until 03/26. Cont to mon CBC. Cont steroid wean- IV Solu-Medrol 20mg  bid tomorrow. F/up CD4 count and anti-ritux antibody. Repeat CRP tomorrow. Continue PT/OT. Speech and swallow eval today. Continue tube feeds. Cont wound care. Remove ruddy. Nephrology, ID ff.       Vital Signs: /72   Pulse (!) 127   Temp 99.6 °F (37.6 °C) (Axillary)   Resp 17   Ht 5' 8\" (1.727 m)   Wt 78.1 kg (172 lb 2.9 oz)   SpO2 97%   BMI 26.18 kg/m²       Pertinent Labs/Diagnostic Results:   03/22 CXR: Mild congestive changes.            Results from last 7 days   Lab Units 03/22/24  0524 03/21/24  1332 03/21/24  0850 03/21/24  0552 03/21/24  0034 03/20/24  0457 03/19/24  0543 03/18/24  0533 03/18/24  0429   WBC Thousand/uL 3.89* 5.30  --  4.87  --    < > 9.92  --  8.39  8.39   HEMOGLOBIN g/dL 9.8* 9.4* 10.0* 9.6* 10.0*   < > 9.5*   < > 5.6*  5.6*   I STAT HEMOGLOBIN   --   --   --   --   --    < >  --   --   --    HEMATOCRIT % 28.6* 27.9* 29.9* 28.5* 30.0*   < > 28.1*   < > 17.6*  17.6*   HEMATOCRIT, ISTAT   --   --   --   --   --    < >  --   --   --    PLATELETS Thousands/uL 77* 82*  --  53*  --    < > 86*  --  89*  89*   BANDS PCT %  --   --   --   --   --   --  7  --  5    < > = values in this interval not displayed.     Results from last 7 days   Lab Units 03/18/24  0429   RETIC CT ABS  87,700   RETIC CT PCT % 4.90*     Results from last 7 days   Lab Units 03/22/24  0524 03/21/24  1332 03/21/24  0557 03/21/24  0552 03/20/24  1333 03/20/24  0831 03/20/24  0502 03/20/24  0457 03/19/24  0543 " 03/18/24  0429 03/16/24  0611 03/15/24  1700   SODIUM mmol/L 145 141  --  142 137  --  137  --  138 135   < >  --    POTASSIUM mmol/L 4.0 4.4  --  4.3 4.8  --  4.9  --  4.7 4.1   < >  --    CHLORIDE mmol/L 113* 111*  --  110* 112*  --  108  --  107 107   < >  --    CO2 mmol/L 20* 19*  --  21 15*  --  18*  --  17* 18*   < >  --    CO2, I-STAT mmol/L  --   --   --   --   --  15*  --   --   --   --   --   --    ANION GAP mmol/L 12 11  --  11 10  --  11  --  14* 10   < >  --    BUN mg/dL 77* 77*  --  79* 87*  --  88*  --  85* 81*   < >  --    CREATININE mg/dL 1.33* 1.38*  --  1.41* 1.54*  --  1.63*  --  1.79* 1.89*   < >  --    EGFR ml/min/1.73sq m 44 42  --  41 36  --  34  --  30 28   < >  --    CALCIUM mg/dL 8.8 8.3*  --  8.3* 8.7  --  8.7  --  8.4 8.0*   < >  --    CALCIUM, IONIZED mmol/L  --   --   --   --   --   --   --   --   --  1.20  --  1.14   CALCIUM, IONIZED, ISTAT mmol/L  --   --   --   --   --  1.40*  --   --   --   --   --   --    MAGNESIUM mg/dL 2.3  --  2.2  --   --   --   --  2.4 2.3 2.3   < >  --    PHOSPHORUS mg/dL 5.9*  --  5.4*  --   --   --   --  5.2* 4.0 4.0   < >  --     < > = values in this interval not displayed.     Results from last 7 days   Lab Units 03/19/24  0543 03/18/24  1214   AST U/L 29  --    ALT U/L 64*  --    ALK PHOS U/L 141*  --    TOTAL PROTEIN g/dL 4.6*  --    ALBUMIN g/dL 2.5*  --    TOTAL BILIRUBIN mg/dL 0.47  --    AMMONIA umol/L  --  51     Results from last 7 days   Lab Units 03/22/24  1401 03/22/24  1141 03/22/24  1022 03/22/24  0803 03/22/24  0604 03/22/24  0409 03/22/24  0211 03/22/24  0003 03/21/24  2215 03/21/24 2019 03/21/24 2016 03/21/24  1745   POC GLUCOSE mg/dl 199* 228* 170* 150* 162* 207* 218* 172* 107 101 77 149*     Results from last 7 days   Lab Units 03/22/24  0524 03/21/24  1332 03/21/24  0552 03/20/24  1333 03/20/24  0502 03/19/24  0543 03/18/24  0429 03/17/24  0451 03/16/24  0611   GLUCOSE RANDOM mg/dL 176* 247* 132 186* 117 207* 139 143* 110            "  No results found for: \"BETA-HYDROXYBUTYRATE\"   Results from last 7 days   Lab Units 03/21/24  1512 03/21/24  0018 03/20/24  1606   PH ART  7.374 7.339* 7.278*   PCO2 ART mm Hg 32.6* 33.6* 33.8*   PO2 ART mm Hg 81.7 91.4 99.4   HCO3 ART mmol/L 18.6* 17.7* 15.5*   BASE EXC ART mmol/L -5.8 -7.2 -10.3   O2 CONTENT ART mL/dL 14.4* 14.5* 15.4*   O2 HGB, ARTERIAL % 95.1 95.5 96.2   ABG SOURCE  Line, Arterial Line, Arterial Line, Arterial     Results from last 7 days   Lab Units 03/19/24  1237   PH NICA  7.242*   PCO2 NICA mm Hg 40.2*   PO2 NICA mm Hg 51.0*   HCO3 NICA mmol/L 16.9*   BASE EXC NICA mmol/L -9.8   O2 CONTENT NICA ml/dL 11.5   O2 HGB, VENOUS % 81.7*     Results from last 7 days   Lab Units 03/20/24  0831   PH, NICA I-STAT  7.323   PCO2, NICA ISTAT mm HG 27.7*   PO2, NICA ISTAT mm HG 57.0*   HCO3, NICA ISTAT mmol/L 14.4*   I STAT BASE EXC mmol/L -11*   I STAT O2 SAT % 88*     Results from last 7 days   Lab Units 03/19/24  1721   CK TOTAL U/L 47         Results from last 7 days   Lab Units 03/20/24  0457   D-DIMER QUANTITATIVE ug/ml FEU 1.92*     Results from last 7 days   Lab Units 03/21/24  1332 03/20/24  0457   PROTIME seconds 17.8* 16.2*   INR  1.49* 1.32*   PTT seconds 40* 34             Results from last 7 days   Lab Units 03/21/24  1332 03/20/24  0840 03/17/24  1604   LACTIC ACID mmol/L 0.8 1.2 1.6                         Results from last 7 days   Lab Units 03/22/24  0844 03/22/24  0555 03/20/24  0840 03/20/24  0837 03/20/24  0834 03/20/24  0834 03/20/24  0830 03/19/24  0555   UNIT PRODUCT CODE  D8152Y85  G1244O40  H9448W20  I6005N96 Z1663A71 I9305R53 A8117X82  G9106C01  P6954L96  K0841R70 X7892D70 L7818L38  V2670R02  P5645E57  U2819Z09 R5307Q45  O0943L91  Z9014J56  T2402G32  M6838T89  A9015G31 M5598C31  H6364V82   UNIT NUMBER  [id number]  [id number]  [id number]  [id number]  [id number]  [id number]  [id number] " [id number]  [id number]  [id number]  [id number]  [id number]  [id number]  [id number]  [id number]  [id number]  [id number]   UNITABO  A  A  A  A A AB O  O  O  O A AB  AB  A  A O  O  O  O  O  O A  A   UNITRH  NEG  NEG  NEG  NEG POS POS POS  POS  POS  POS POS POS  POS  POS  POS POS  POS  POS  POS  POS  POS NEG  NEG   CROSSMATCH  Compatible  Compatible  Compatible  Compatible  --   --   --   --   --  Compatible  Compatible Compatible  Compatible   UNIT DISPENSE STATUS  Return to Inv  Return to Inv  Return to Inv  Presumed Trans Presumed Trans Return to Inv Return to Inv  Return to Inv  Return to Inv  Return to Inv Return to Inv Return to Inv  Return to Inv  Return to Inv  Return to Inv Return to Inv  Presumed Trans  Return to Inv  Return to Inv  Presumed Trans  Return to Inv Presumed Trans  Presumed Trans   UNIT PRODUCT VOL mL 350  350  350  350 248 246 300  300  350  350 300 191  203  280  250 350  350  300  350  350  350 350  350             Results from last 7 days   Lab Units 03/21/24  1232 03/19/24  0543 03/18/24  0429 03/17/24  0451 03/16/24  0611   CRP mg/L 226.8* 41.7* 38.2* 78.9* 13.4*             Results from last 7 days   Lab Units 03/17/24  1753   SODIUM UR  40           Medications:   Scheduled Medications:  chlorhexidine, 15 mL, Mouth/Throat, Q12H SARTHAK  enoxaparin, 40 mg, Subcutaneous, Q24H SARTHAK  ipratropium, 0.5 mg, Nebulization, TID  lamoTRIgine, 150 mg, Oral, BID  levalbuterol, 1.25 mg, Nebulization, TID  methylPREDNISolone sodium succinate, 30 mg, Intravenous, Q12H SARTHAK   Followed by  [START ON 3/24/2024] methylPREDNISolone sodium succinate, 20 mg, Intravenous, Q12H SARTHAK   Followed by  [START ON 3/26/2024] methylPREDNISolone sodium succinate, 10 mg, Intravenous, Q12H SARTHAK   Followed by  [START ON 3/28/2024] methylPREDNISolone sodium  succinate, 10 mg, Intravenous, Daily  minocycline, 200 mg, Per NG Tube, BID  modafinil, 100 mg, Per NG Tube, Daily  nystatin, , Topical, BID  pantoprazole, 40 mg, Intravenous, Q12H SARTHAK  polyethylene glycol, 17 g, Per NG Tube, Daily  sodium chloride, 2 spray, Each Nare, Q4H  sodium chloride, 4 mL, Nebulization, TID  topiramate, 50 mg, Per PEG Tube, BID  venlafaxine, 12.5 mg, Oral, TID With Meals      Continuous IV Infusions:  insulin regular (HumuLIN R,NovoLIN R) 1 Units/mL in sodium chloride 0.9 % 100 mL infusion, 0.3-21 Units/hr, Intravenous, Titrated      PRN Meds:  acetaminophen, 650 mg, Oral, Q6H PRN  fentaNYL, 50 mcg, Intravenous, Q1H PRN  ondansetron, 4 mg, Intravenous, Q6H PRN  sodium chloride, 1 Application, Nasal, Q1H PRN        Discharge Plan: D    Network Utilization Review Department  ATTENTION: Please call with any questions or concerns to [phone] and carefully listen to the prompts so that you are directed to the right person. All voicemails are confidential.   For Discharge needs, contact Care Management DC Support Team at [phone] opt. 2  Send all requests for admission clinical reviews, approved or denied determinations and any other requests to dedicated fax number below belonging to the campus where the patient is receiving treatment. List of dedicated fax numbers for the Facilities:  FACILITY NAME UR FAX NUMBER   ADMISSION DENIALS (Administrative/Medical Necessity) [phone]   DISCHARGE SUPPORT TEAM (NETWORK) [phone]   PARENT CHILD HEALTH (Maternity/NICU/Pediatrics) [phone]   [facility] [phone]   [facility] [phone]   [facility] [phone]   [facility] [phone]   [facility] [phone]   [facility] [phone]   [facility] [phone]   [facility] [phone]   [facility] [phone]   [facility] [phone]   [facility] [phone]   [facility] [phone]

## 2024-03-22 NOTE — ASSESSMENT & PLAN NOTE
Code Status: full - Level 1  Ongoing medical optimization without limits at this time. Hopeful for slow recovery. Min does express understanding of periodic setbacks but is hoping Carla can build some momentum to become medically stable for d/c to rehab. He is definitely more hopeful of this today given slow improvements this week.

## 2024-03-22 NOTE — WOUND OSTOMY CARE
Consult Note - Wound   Carla Hunt 59 y.o. female MRN: [id number]  Unit/Bed#: [facility] 12 Encounter: [id number]        History and Present Illness:  Wound care re-consulted for patient for trach wound. Patient seen lying on critical care low air loss mattress. Wound care following patient for sacral wound - refer to noted from 3/20 for assessment of area. Mid abdominal wound managed via wound vac- surgery team managing wound vac.     Assessment Findings:   HA Unstageable Trach Site: irregular in shape area of full thickness skin loss located underneath trach plate. Wound bed is moist brown/yellow slough tissue. Sofie-wound is fragile and pink in color. Small serosanguineous drainage noted. Recommend silver alginate and mepilex up. Critical care resident made aware of wound.     No induration, fluctuance, odor, warmth/temperature differences, redness, or purulence noted to the above noted wounds and skin areas assessed. New dressings applied per orders listed below. Patient tolerated well- no s/s of non-verbal pain or discomfort observed during the encounter. Bedside nurse aware of plan of care. See flow sheets for more detailed assessment findings.      Orders listed below and wound care will continue to follow, call or tiger text with questions.     Skin Care Plan:  1-Cleanse b/l sacro-buttocks with soap and water, pat dry, and apply bordered silicone foam dressing to cover the wounds. Change every other day and as needed for soilage/dislodgement.   2-Turn/reposition q2h or when medically stable for pressure re-distribution on skin .  3-Elevate heels to offload pressure. Apply Hydragaurd to B/L heels BID and PRN  4-Moisturize skin daily with skin nourishing cream  5-Cleanse b/l groin with soap and water, pat dry, and dust the skin lightly with nystatin powder per order. May use alginate rope to skin folds if skin is open or macerated. Change BID and PRN.   6-Preventative Hydraguard to bilateral heels BID and PRN.    7-Trach Wound: cleanse with NSS and pat dry. Apply Melgisorb Ag around trach site and cover with Mepilex Up Dressing (cut a T into dressings to form into spilt gauze shape). Change daily or Prn soilage/displacement.       Wounds:  Wound 03/22/24 Pressure Injury Neck Anterior (Active)   Wound Image   03/22/24 1251   Wound Description Brown;Slough;Yellow;Drainage 03/22/24 1251   Pressure Injury Stage U 03/22/24 1251   Sofie-wound Assessment Fragile 03/22/24 1251   Wound Length (cm) 1.5 cm 03/22/24 1251   Wound Width (cm) 1 cm 03/22/24 1251   Wound Depth (cm) 0.2 cm 03/22/24 1251   Wound Surface Area (cm^2) 1.5 cm^2 03/22/24 1251   Wound Volume (cm^3) 0.3 cm^3 03/22/24 1251   Calculated Wound Volume (cm^3) 0.3 cm^3 03/22/24 1251   Drainage Amount Small 03/22/24 1251   Drainage Description Serosanguineous 03/22/24 1251   Non-staged Wound Description Full thickness 03/22/24 1251   Treatments Cleansed;Site care;Irrigation with NSS 03/22/24 1251   Dressing Calcium Alginate with Silver;Foam 03/22/24 1251   Dressing Changed New 03/22/24 1251   Patient Tolerance Tolerated well 03/22/24 1251   Dressing Status Clean;Dry;Intact 03/22/24 1251               Ya Flannery RN, BSN, CWOCN

## 2024-03-22 NOTE — ASSESSMENT & PLAN NOTE
Time spent providing supportive listening to spouse Min at bedside  Patient to remain full code,  Decisional apparatus:  Patient is not competent on my exam today.  If competence is lost, patient's substitute decision maker would default to spouse Min by [place] Act 169.  Advance Directive / Living Will / POLST:  None on file, unable to complete

## 2024-03-22 NOTE — PLAN OF CARE
Problem: Prexisting or High Potential for Compromised Skin Integrity  Goal: Skin integrity is maintained or improved  Description: INTERVENTIONS:  - Identify patients at risk for skin breakdown  - Assess and monitor skin integrity  - Assess and monitor nutrition and hydration status  - Monitor labs   - Assess for incontinence   - Turn and reposition patient  - Assist with mobility/ambulation  - Relieve pressure over bony prominences  - Avoid friction and shearing  - Provide appropriate hygiene as needed including keeping skin clean and dry  - Evaluate need for skin moisturizer/barrier cream  - Collaborate with interdisciplinary team   - Patient/family teaching  - Consider wound care consult   Outcome: Not Progressing     Problem: INFECTION - ADULT  Goal: Absence or prevention of progression during hospitalization  Description: INTERVENTIONS:  - Assess and monitor for signs and symptoms of infection  - Monitor lab/diagnostic results  - Monitor all insertion sites, i.e. indwelling lines, tubes, and drains  - Monitor endotracheal if appropriate and nasal secretions for changes in amount and color  - [place] appropriate cooling/warming therapies per order  - Administer medications as ordered  - Instruct and encourage patient and family to use good hand hygiene technique  - Identify and instruct in appropriate isolation precautions for identified infection/condition  Outcome: Not Progressing     Problem: SAFETY ADULT  Goal: Patient will remain free of falls  Description: INTERVENTIONS:  - Educate patient/family on patient safety including physical limitations  - Instruct patient to call for assistance with activity   - Consult OT/PT to assist with strengthening/mobility   - Keep Call bell within reach  - Keep bed low and locked with side rails adjusted as appropriate  - Keep care items and personal belongings within reach  - Initiate and maintain comfort rounds  - Make Fall Risk Sign visible to staff  - Offer  Toileting every 2 Hours, in advance of need  - Initiate/Maintain bed alarm  - Obtain necessary fall risk management equipment: non skid footwear  - Apply yellow socks and bracelet for high fall risk patients  - Consider moving patient to room near nurses station  Outcome: Not Progressing     Problem: RESPIRATORY - ADULT  Goal: Achieves optimal ventilation and oxygenation  Description: INTERVENTIONS:  - Assess for changes in respiratory status  - Assess for changes in mentation and behavior  - Position to facilitate oxygenation and minimize respiratory effort  - Oxygen administered by appropriate delivery if ordered  - Initiate smoking cessation education as indicated  - Encourage broncho-pulmonary hygiene including cough, deep breathe, Incentive Spirometry  - Assess the need for suctioning and aspirate as needed  - Assess and instruct to report SOB or any respiratory difficulty  - Respiratory Therapy support as indicated  Outcome: Not Progressing     Problem: SKIN/TISSUE INTEGRITY - ADULT  Goal: Skin Integrity remains intact(Skin Breakdown Prevention)  Description: Assess:  -Perform Flavio assessment every shift   -Clean and moisturize skin every day   -Inspect skin when repositioning, toileting, and assisting with ADLS  -Assess under medical devices such as ronny  every hour   -Assess extremities for adequate circulation and sensation     Bed Management:  -Have minimal linens on bed & keep smooth, unwrinkled  -Change linens as needed when moist or perspiring  -Avoid sitting or lying in one position for more than 2 hours while in bed  -Keep HOB at 45 degrees     Toileting:  -Offer bedside commode  -Assess for incontinence every hour  -Use incontinent care products after each incontinent episode such as moisture barrier cream     Activity:  -Mobilize patient 3 times a day  -Encourage activity and walks on unit  -Encourage or provide ROM exercises   -Turn and reposition patient every 2 Hours  -Use appropriate  equipment to lift or move patient in bed  -Instruct/ Assist with weight shifting every hour when out of bed in chair  -Consider limitation of chair time 2 hour intervals    Skin Care:  -Avoid use of baby powder, tape, friction and shearing, hot water or constrictive clothing  -Relieve pressure over bony prominences using allevyn   -Do not massage red bony areas    Next Steps:  -Teach patient strategies to minimize risks such as weight shifting    -Consider consults to  interdisciplinary teams such as PT  Outcome: Not Progressing  Goal: Incision(s), wounds(s) or drain site(s) healing without S/S of infection  Description: INTERVENTIONS  - Assess and document dressing, incision, wound bed, drain sites and surrounding tissue  - Provide patient and family education  Outcome: Not Progressing  Goal: Pressure injury heals and does not worsen  Description: Interventions:  - Implement low air loss mattress or specialty surface (Criteria met)  - Apply silicone foam dressing  - Consider nutrition services referral as needed  Outcome: Not Progressing     Problem: Potential for Falls  Goal: Patient will remain free of falls  Description: INTERVENTIONS:  - Educate patient/family on patient safety including physical limitations  - Instruct patient to call for assistance with activity   - Consult OT/PT to assist with strengthening/mobility   - Keep Call bell within reach  - Keep bed low and locked with side rails adjusted as appropriate  - Keep care items and personal belongings within reach  - Initiate and maintain comfort rounds  - Make Fall Risk Sign visible to staff  - Offer Toileting every 2 Hours, in advance of need  - Initiate/Maintain bed alarm  - Obtain necessary fall risk management equipment: non skid footwear  - Apply yellow socks and bracelet for high fall risk patients  - Consider moving patient to room near nurses station  Outcome: Not Progressing     Problem: Nutrition/Hydration-ADULT  Goal: Nutrient/Hydration  intake appropriate for improving, restoring or maintaining nutritional needs  Description: Monitor and assess patient's nutrition/hydration status for malnutrition. Collaborate with interdisciplinary team and initiate plan and interventions as ordered.  Monitor patient's weight and dietary intake as ordered or per policy. Utilize nutrition screening tool and intervene as necessary. Determine patient's food preferences and provide high-protein, high-caloric foods as appropriate.     INTERVENTIONS:  - Monitor oral intake, urinary output, labs, and treatment plans  - Assess nutrition and hydration status and recommend course of action  - Evaluate amount of meals eaten  - Assist patient with eating if necessary   - Allow adequate time for meals  - Recommend/ encourage appropriate diets, oral nutritional supplements, and vitamin/mineral supplements  - Order, calculate, and assess calorie counts as needed  - Recommend, monitor, and adjust tube feedings and TPN/PPN based on assessed needs  - Assess need for intravenous fluids  - Provide specific nutrition/hydration education as appropriate  - Include patient/family/caregiver in decisions related to nutrition  Outcome: Not Progressing     Problem: COPING  Goal: Pt/Family able to verbalize concerns and demonstrate effective coping strategies  Description: INTERVENTIONS:  - Assist patient/family to identify coping skills, available support systems and cultural and spiritual values  - Provide emotional support, including active listening and acknowledgement of concerns of patient and caregivers  - Reduce environmental stimuli, as able  - Provide patient education  - Assess for spiritual pain/suffering and initiate spiritual care, including notification of Pastoral Care or [place] based community as needed  - Assess effectiveness of coping strategies  Outcome: Not Progressing  Goal: Will report anxiety at manageable levels  Description: INTERVENTIONS:  - Administer medication as  ordered  - Teach and encourage coping skills  - Provide emotional support  - Assess patient/family for anxiety and ability to cope  Outcome: Not Progressing     Problem: BEHAVIOR  Goal: Pt/Family maintain appropriate behavior and adhere to behavioral management agreement, if implemented  Description: INTERVENTIONS:  - Assess the family dynamic   - Encourage verbalization of thoughts and concerns in a socially appropriate manner  - Assess patient/family's coping skills and non-compliant behavior (including use of illegal substances).  - Utilize positive, consistent limit setting strategies supporting safety of patient, staff and others  - Initiate consult with Case Management, Spiritual Care or other ancillary services as appropriate  - If a patient's/visitor's behavior jeopardizes the safety of the patient, staff, or others, refer to organization procedure.   - Notify Security of behavior or suspected illegal substances which indicate the need for search of the patient and/or belongings  - Encourage participation in the decision making process about a behavioral management agreement; implement if patient meets criteria  Outcome: Not Progressing     Problem: NEUROSENSORY - ADULT  Goal: Achieves stable or improved neurological status  Description: INTERVENTIONS  - Monitor and report changes in neurological status  - Monitor vital signs such as temperature, blood pressure, glucose, and any other labs ordered   - Initiate measures to prevent increased intracranial pressure  - Monitor for seizure activity and implement precautions if appropriate      Outcome: Not Progressing  Goal: Achieves maximal functionality and self care  Description: INTERVENTIONS  - Monitor swallowing and airway patency with patient fatigue and changes in neurological status  - Encourage and assist patient to increase activity and self care.   - Encourage visually impaired, hearing impaired and aphasic patients to use assistive/communication  devices  Outcome: Not Progressing     Problem: CARDIOVASCULAR - ADULT  Goal: Maintains optimal cardiac output and hemodynamic stability  Description: INTERVENTIONS:  - Monitor I/O, vital signs and rhythm  - Monitor for S/S and trends of decreased cardiac output  - Administer and titrate ordered vasoactive medications to optimize hemodynamic stability  - Assess quality of pulses, skin color and temperature  - Assess for signs of decreased coronary artery perfusion  - Instruct patient to report change in severity of symptoms  Outcome: Not Progressing  Goal: Absence of cardiac dysrhythmias or at baseline rhythm  Description: INTERVENTIONS:  - Continuous cardiac monitoring, vital signs, obtain 12 lead EKG if ordered  - Administer antiarrhythmic and heart rate control medications as ordered  - Monitor electrolytes and administer replacement therapy as ordered  Outcome: Not Progressing     Problem: GASTROINTESTINAL - ADULT  Goal: Minimal or absence of nausea and/or vomiting  Description: INTERVENTIONS:  - Administer IV fluids if ordered to ensure adequate hydration  - Maintain NPO status until nausea and vomiting are resolved  - Nasogastric tube if ordered  - Administer ordered antiemetic medications as needed  - Provide nonpharmacologic comfort measures as appropriate  - Advance diet as tolerated, if ordered  - Consider nutrition services referral to assist patient with adequate nutrition and appropriate food choices  Outcome: Not Progressing  Goal: Maintains or returns to baseline bowel function  Description: INTERVENTIONS:  - Assess bowel function  - Encourage oral fluids to ensure adequate hydration  - Administer IV fluids if ordered to ensure adequate hydration  - Administer ordered medications as needed  - Encourage mobilization and activity  - Consider nutritional services referral to assist patient with adequate nutrition and appropriate food choices  Outcome: Not Progressing  Goal: Maintains adequate nutritional  intake  Description: INTERVENTIONS:  - Monitor percentage of each meal consumed  - Identify factors contributing to decreased intake, treat as appropriate  - Assist with meals as needed  - Monitor I&O, weight, and lab values if indicated  - Obtain nutrition services referral as needed  Outcome: Not Progressing  Goal: Establish and maintain optimal ostomy function  Description: INTERVENTIONS:  - Assess bowel function  - Encourage oral fluids to ensure adequate hydration  - Administer IV fluids if ordered to ensure adequate hydration   - Administer ordered medications as needed  - Encourage mobilization and activity  - Nutrition services referral to assist patient with appropriate food choices  - Assess stoma site  - Consider wound care consult   Outcome: Not Progressing  Goal: Oral mucous membranes remain intact  Description: INTERVENTIONS  - Assess oral mucosa and hygiene practices  - Implement preventative oral hygiene regimen  - Implement oral medicated treatments as ordered  - Initiate Nutrition services referral as needed  Outcome: Not Progressing     Problem: GENITOURINARY - ADULT  Goal: Maintains or returns to baseline urinary function  Description: INTERVENTIONS:  - Assess urinary function  - Encourage oral fluids to ensure adequate hydration if ordered  - Administer IV fluids as ordered to ensure adequate hydration  - Administer ordered medications as needed  - Offer frequent toileting  - Follow urinary retention protocol if ordered  Outcome: Not Progressing  Goal: Urinary catheter remains patent  Description: INTERVENTIONS:  - Assess patency of urinary catheter  - If patient has a chronic marie, consider changing catheter if non-functioning  - Follow guidelines for intermittent irrigation of non-functioning urinary catheter  Outcome: Not Progressing     Problem: METABOLIC, FLUID AND ELECTROLYTES - ADULT  Goal: Electrolytes maintained within normal limits  Description: INTERVENTIONS:  - Monitor labs and  assess patient for signs and symptoms of electrolyte imbalances  - Administer electrolyte replacement as ordered  - Monitor response to electrolyte replacements, including repeat lab results as appropriate  - Instruct patient on fluid and nutrition as appropriate  Outcome: Not Progressing  Goal: Fluid balance maintained  Description: INTERVENTIONS:  - Monitor labs   - Monitor I/O and WT  - Instruct patient on fluid and nutrition as appropriate  - Assess for signs & symptoms of volume excess or deficit  Outcome: Not Progressing  Goal: Glucose maintained within target range  Description: INTERVENTIONS:  - Monitor Blood Glucose as ordered  - Assess for signs and symptoms of hyperglycemia and hypoglycemia  - Administer ordered medications to maintain glucose within target range  - Assess nutritional intake and initiate nutrition service referral as needed  Outcome: Not Progressing     Problem: HEMATOLOGIC - ADULT  Goal: Maintains hematologic stability  Description: INTERVENTIONS  - Assess for signs and symptoms of bleeding or hemorrhage  - Monitor labs  - Administer supportive blood products/factors as ordered and appropriate  Outcome: Not Progressing     Problem: MUSCULOSKELETAL - ADULT  Goal: Maintain or return mobility to safest level of function  Description: INTERVENTIONS:  - Assess patient's ability to carry out ADLs; assess patient's baseline for ADL function and identify physical deficits which impact ability to perform ADLs (bathing, care of mouth/teeth, toileting, grooming, dressing, etc.)  - Assess/evaluate cause of self-care deficits   - Assess range of motion  - Assess patient's mobility  - Assess patient's need for assistive devices and provide as appropriate  - Encourage maximum independence but intervene and supervise when necessary  - Involve family in performance of ADLs  - Assess for home care needs following discharge   - Consider OT consult to assist with ADL evaluation and planning for discharge  -  Provide patient education as appropriate  Outcome: Not Progressing

## 2024-03-22 NOTE — PROGRESS NOTES
Nutrition Follow-Up/Recommendations:    Per chart review pt has been able to come off the vent and tolerate trach collar for periods of time the last couple days, updated estimated nutrition needs accordingly.     If planning to continue with Vital 1.5 formula, recommend removing Prosource packet, continue with 45mL/hr.     Noted that phosphorus is trending up.   Consider transitioning to renal formulation, would suggest Nepro with goal rate of 40mL/hr continuous, which would provide 1728 kcals, 78g protein, 698mL water in TF formula.

## 2024-03-22 NOTE — PLAN OF CARE
"  Problem: OCCUPATIONAL THERAPY ADULT  Goal: Performs self-care activities at highest level of function for planned discharge setting.  See evaluation for individualized goals.  Description: Treatment Interventions: ADL retraining, Functional transfer training, UE strengthening/ROM, Endurance training, Patient/family training, Equipment evaluation/education, Compensatory technique education, Continued evaluation, Energy conservation, Activityengagement          See flowsheet documentation for full assessment, interventions and recommendations.   Outcome: Progressing  Note: Limitation: Decreased ADL status, Decreased UE ROM, Decreased UE strength, Decreased Safe judgement during ADL, Decreased cognition, Decreased endurance, Decreased self-care trans, Decreased high-level ADLs, Non-func R UE, Non-func L UE  Prognosis: Fair, Poor  Assessment: Pt was seen for skilled OT treatment session on 3/22/24, focusing on sitting/standing tolerance and balance to promote safe engagement in functional tasks, endurance/activity tolerance, and PROM to promote functional use of B/L UEs. Pt found supine in bed, agreeable to session. Pt required max A x2 to sit EOB, requiring mod Ax1 to maintain upright posture while sitting. Pt performed 1 sts with max A x3. Pt then engaged in PROM stretching of B/L UEs while EOB, being able to make a \"thumbs up\" with R+L hands. Pt was returned supine in bed, and was angled upwards using Kreg bed. Pt was angled 30 degrees upright, remained upright for 5 minutes and returned supine in bed, HOB elevated. BP and HR monitored throughout session, remained WNL.     Rehab Resource Intensity Level, OT: II (Moderate Resource Intensity)          "

## 2024-03-23 LAB
ABO GROUP BLD: NORMAL
ALBUMIN SERPL BCP-MCNC: 2.3 G/DL (ref 3.5–5)
ALP SERPL-CCNC: 98 U/L (ref 34–104)
ALT SERPL W P-5'-P-CCNC: 40 U/L (ref 7–52)
ANION GAP SERPL CALCULATED.3IONS-SCNC: 13 MMOL/L (ref 4–13)
ANISOCYTOSIS BLD QL SMEAR: PRESENT
AST SERPL W P-5'-P-CCNC: 21 U/L (ref 13–39)
BASOPHILS # BLD MANUAL: 0 THOUSAND/UL (ref 0–0.1)
BASOPHILS NFR MAR MANUAL: 0 % (ref 0–1)
BILIRUB SERPL-MCNC: 0.45 MG/DL (ref 0.2–1)
BLD GP AB SCN SERPL QL: NEGATIVE
BUN SERPL-MCNC: 75 MG/DL (ref 5–25)
CALCIUM ALBUM COR SERPL-MCNC: 10.2 MG/DL (ref 8.3–10.1)
CALCIUM SERPL-MCNC: 8.8 MG/DL (ref 8.4–10.2)
CHLORIDE SERPL-SCNC: 116 MMOL/L (ref 96–108)
CO2 SERPL-SCNC: 21 MMOL/L (ref 21–32)
CREAT SERPL-MCNC: 1.12 MG/DL (ref 0.6–1.3)
CRP SERPL QL: 249.3 MG/L
EOSINOPHIL # BLD MANUAL: 0 THOUSAND/UL (ref 0–0.4)
EOSINOPHIL NFR BLD MANUAL: 0 % (ref 0–6)
ERYTHROCYTE [DISTWIDTH] IN BLOOD BY AUTOMATED COUNT: 18.6 % (ref 11.6–15.1)
GFR SERPL CREATININE-BSD FRML MDRD: 54 ML/MIN/1.73SQ M
GLUCOSE SERPL-MCNC: 108 MG/DL (ref 65–140)
GLUCOSE SERPL-MCNC: 116 MG/DL (ref 65–140)
GLUCOSE SERPL-MCNC: 142 MG/DL (ref 65–140)
GLUCOSE SERPL-MCNC: 146 MG/DL (ref 65–140)
GLUCOSE SERPL-MCNC: 146 MG/DL (ref 65–140)
GLUCOSE SERPL-MCNC: 148 MG/DL (ref 65–140)
GLUCOSE SERPL-MCNC: 159 MG/DL (ref 65–140)
GLUCOSE SERPL-MCNC: 161 MG/DL (ref 65–140)
GLUCOSE SERPL-MCNC: 182 MG/DL (ref 65–140)
GLUCOSE SERPL-MCNC: 191 MG/DL (ref 65–140)
GLUCOSE SERPL-MCNC: 194 MG/DL (ref 65–140)
GLUCOSE SERPL-MCNC: 194 MG/DL (ref 65–140)
GLUCOSE SERPL-MCNC: 197 MG/DL (ref 65–140)
HCT VFR BLD AUTO: 29.9 % (ref 34.8–46.1)
HGB BLD-MCNC: 9.7 G/DL (ref 11.5–15.4)
LYMPHOCYTES # BLD AUTO: 0 % (ref 14–44)
LYMPHOCYTES # BLD AUTO: 0 THOUSAND/UL (ref 0.6–4.47)
MAGNESIUM SERPL-MCNC: 2.5 MG/DL (ref 1.9–2.7)
MCH RBC QN AUTO: 30.6 PG (ref 26.8–34.3)
MCHC RBC AUTO-ENTMCNC: 32.4 G/DL (ref 31.4–37.4)
MCV RBC AUTO: 94 FL (ref 82–98)
METAMYELOCYTES NFR BLD MANUAL: 1 % (ref 0–1)
MONOCYTES # BLD AUTO: 0.05 THOUSAND/UL (ref 0–1.22)
MONOCYTES NFR BLD: 2 % (ref 4–12)
MYELOCYTES NFR BLD MANUAL: 2 % (ref 0–1)
NEUTROPHILS # BLD MANUAL: 2.31 THOUSAND/UL (ref 1.85–7.62)
NEUTS BAND NFR BLD MANUAL: 3 % (ref 0–8)
NEUTS SEG NFR BLD AUTO: 92 % (ref 43–75)
NRBC BLD AUTO-RTO: 3 /100 WBC (ref 0–2)
PHOSPHATE SERPL-MCNC: 4.8 MG/DL (ref 2.7–4.5)
PLATELET # BLD AUTO: 68 THOUSANDS/UL (ref 149–390)
PLATELET BLD QL SMEAR: ABNORMAL
PMV BLD AUTO: 12.2 FL (ref 8.9–12.7)
POTASSIUM SERPL-SCNC: 4.8 MMOL/L (ref 3.5–5.3)
PROT SERPL-MCNC: 4.6 G/DL (ref 6.4–8.4)
RBC # BLD AUTO: 3.17 MILLION/UL (ref 3.81–5.12)
RBC MORPH BLD: PRESENT
RH BLD: NEGATIVE
SODIUM SERPL-SCNC: 150 MMOL/L (ref 135–147)
SPECIMEN EXPIRATION DATE: NORMAL
TOXIC GRANULES BLD QL SMEAR: PRESENT
WBC # BLD AUTO: 2.43 THOUSAND/UL (ref 4.31–10.16)

## 2024-03-23 PROCEDURE — 99233 SBSQ HOSP IP/OBS HIGH 50: CPT | Performed by: INTERNAL MEDICINE

## 2024-03-23 PROCEDURE — 86850 RBC ANTIBODY SCREEN: CPT

## 2024-03-23 PROCEDURE — 94640 AIRWAY INHALATION TREATMENT: CPT

## 2024-03-23 PROCEDURE — 94760 N-INVAS EAR/PLS OXIMETRY 1: CPT

## 2024-03-23 PROCEDURE — 82948 REAGENT STRIP/BLOOD GLUCOSE: CPT

## 2024-03-23 PROCEDURE — 80053 COMPREHEN METABOLIC PANEL: CPT

## 2024-03-23 PROCEDURE — 92610 EVALUATE SWALLOWING FUNCTION: CPT

## 2024-03-23 PROCEDURE — 84100 ASSAY OF PHOSPHORUS: CPT

## 2024-03-23 PROCEDURE — 85027 COMPLETE CBC AUTOMATED: CPT

## 2024-03-23 PROCEDURE — 85007 BL SMEAR W/DIFF WBC COUNT: CPT

## 2024-03-23 PROCEDURE — 86900 BLOOD TYPING SEROLOGIC ABO: CPT

## 2024-03-23 PROCEDURE — 86901 BLOOD TYPING SEROLOGIC RH(D): CPT

## 2024-03-23 PROCEDURE — 83735 ASSAY OF MAGNESIUM: CPT | Performed by: STUDENT IN AN ORGANIZED HEALTH CARE EDUCATION/TRAINING PROGRAM

## 2024-03-23 PROCEDURE — 86140 C-REACTIVE PROTEIN: CPT

## 2024-03-23 PROCEDURE — 99232 SBSQ HOSP IP/OBS MODERATE 35: CPT | Performed by: INTERNAL MEDICINE

## 2024-03-23 PROCEDURE — C9113 INJ PANTOPRAZOLE SODIUM, VIA: HCPCS | Performed by: INTERNAL MEDICINE

## 2024-03-23 PROCEDURE — 92507 TX SP LANG VOICE COMM INDIV: CPT

## 2024-03-23 PROCEDURE — 94762 N-INVAS EAR/PLS OXIMTRY CONT: CPT

## 2024-03-23 RX ORDER — METHYLPREDNISOLONE SODIUM SUCCINATE 40 MG/ML
30 INJECTION, POWDER, LYOPHILIZED, FOR SOLUTION INTRAMUSCULAR; INTRAVENOUS EVERY 12 HOURS SCHEDULED
Status: COMPLETED | OUTPATIENT
Start: 2024-03-23 | End: 2024-03-24

## 2024-03-23 RX ORDER — SODIUM CHLORIDE, SODIUM GLUCONATE, SODIUM ACETATE, POTASSIUM CHLORIDE, MAGNESIUM CHLORIDE, SODIUM PHOSPHATE, DIBASIC, AND POTASSIUM PHOSPHATE .53; .5; .37; .037; .03; .012; .00082 G/100ML; G/100ML; G/100ML; G/100ML; G/100ML; G/100ML; G/100ML
1000 INJECTION, SOLUTION INTRAVENOUS ONCE
Status: COMPLETED | OUTPATIENT
Start: 2024-03-23 | End: 2024-03-23

## 2024-03-23 RX ORDER — METHYLPREDNISOLONE SODIUM SUCCINATE 40 MG/ML
10 INJECTION, POWDER, LYOPHILIZED, FOR SOLUTION INTRAMUSCULAR; INTRAVENOUS EVERY 12 HOURS SCHEDULED
Status: DISCONTINUED | OUTPATIENT
Start: 2024-03-28 | End: 2024-03-25

## 2024-03-23 RX ORDER — METHYLPREDNISOLONE SODIUM SUCCINATE 40 MG/ML
10 INJECTION, POWDER, LYOPHILIZED, FOR SOLUTION INTRAMUSCULAR; INTRAVENOUS DAILY
Status: DISCONTINUED | OUTPATIENT
Start: 2024-03-31 | End: 2024-03-25

## 2024-03-23 RX ORDER — METHYLPREDNISOLONE SODIUM SUCCINATE 40 MG/ML
20 INJECTION, POWDER, LYOPHILIZED, FOR SOLUTION INTRAMUSCULAR; INTRAVENOUS EVERY 12 HOURS SCHEDULED
Status: DISCONTINUED | OUTPATIENT
Start: 2024-03-25 | End: 2024-03-25

## 2024-03-23 RX ADMIN — IPRATROPIUM BROMIDE 0.5 MG: 0.5 SOLUTION RESPIRATORY (INHALATION) at 08:19

## 2024-03-23 RX ADMIN — MINOCYCLINE HYDROCHLORIDE 200 MG: 50 TABLET, FILM COATED ORAL at 08:37

## 2024-03-23 RX ADMIN — METHYLPREDNISOLONE SODIUM SUCCINATE 30 MG: 40 INJECTION, POWDER, FOR SOLUTION INTRAMUSCULAR; INTRAVENOUS at 22:00

## 2024-03-23 RX ADMIN — Medication 2 SPRAY: at 17:46

## 2024-03-23 RX ADMIN — SODIUM CHLORIDE 4 UNITS/HR: 9 INJECTION, SOLUTION INTRAVENOUS at 17:54

## 2024-03-23 RX ADMIN — VENLAFAXINE 12.5 MG: 25 TABLET ORAL at 13:00

## 2024-03-23 RX ADMIN — PANTOPRAZOLE SODIUM 40 MG: 40 INJECTION, POWDER, FOR SOLUTION INTRAVENOUS at 08:35

## 2024-03-23 RX ADMIN — SODIUM CHLORIDE SOLN NEBU 3% 4 ML: 3 NEBU SOLN at 08:19

## 2024-03-23 RX ADMIN — MODAFINIL 100 MG: 100 TABLET ORAL at 08:35

## 2024-03-23 RX ADMIN — CHLORHEXIDINE GLUCONATE 0.12% ORAL RINSE 15 ML: 1.2 LIQUID ORAL at 08:35

## 2024-03-23 RX ADMIN — METHYLPREDNISOLONE SODIUM SUCCINATE 30 MG: 40 INJECTION, POWDER, FOR SOLUTION INTRAMUSCULAR; INTRAVENOUS at 08:34

## 2024-03-23 RX ADMIN — LAMOTRIGINE 150 MG: 100 TABLET ORAL at 08:35

## 2024-03-23 RX ADMIN — SODIUM CHLORIDE SOLN NEBU 3% 4 ML: 3 NEBU SOLN at 19:45

## 2024-03-23 RX ADMIN — LEVALBUTEROL HYDROCHLORIDE 1.25 MG: 1.25 SOLUTION RESPIRATORY (INHALATION) at 08:19

## 2024-03-23 RX ADMIN — Medication 2 SPRAY: at 02:43

## 2024-03-23 RX ADMIN — ENOXAPARIN SODIUM 40 MG: 40 INJECTION SUBCUTANEOUS at 08:35

## 2024-03-23 RX ADMIN — TOPIRAMATE 50 MG: 100 TABLET, FILM COATED ORAL at 09:06

## 2024-03-23 RX ADMIN — VENLAFAXINE 12.5 MG: 25 TABLET ORAL at 17:44

## 2024-03-23 RX ADMIN — TOPIRAMATE 50 MG: 100 TABLET, FILM COATED ORAL at 18:27

## 2024-03-23 RX ADMIN — Medication 2 SPRAY: at 22:01

## 2024-03-23 RX ADMIN — Medication 2 SPRAY: at 05:19

## 2024-03-23 RX ADMIN — NYSTATIN: 100000 POWDER TOPICAL at 08:38

## 2024-03-23 RX ADMIN — LAMOTRIGINE 150 MG: 100 TABLET ORAL at 17:50

## 2024-03-23 RX ADMIN — LEVALBUTEROL HYDROCHLORIDE 1.25 MG: 1.25 SOLUTION RESPIRATORY (INHALATION) at 19:45

## 2024-03-23 RX ADMIN — Medication 2 SPRAY: at 14:18

## 2024-03-23 RX ADMIN — IPRATROPIUM BROMIDE 0.5 MG: 0.5 SOLUTION RESPIRATORY (INHALATION) at 15:47

## 2024-03-23 RX ADMIN — SODIUM CHLORIDE, SODIUM GLUCONATE, SODIUM ACETATE, POTASSIUM CHLORIDE, MAGNESIUM CHLORIDE, SODIUM PHOSPHATE, DIBASIC, AND POTASSIUM PHOSPHATE 1000 ML: .53; .5; .37; .037; .03; .012; .00082 INJECTION, SOLUTION INTRAVENOUS at 08:00

## 2024-03-23 RX ADMIN — PANTOPRAZOLE SODIUM 40 MG: 40 INJECTION, POWDER, FOR SOLUTION INTRAVENOUS at 22:00

## 2024-03-23 RX ADMIN — VENLAFAXINE 12.5 MG: 25 TABLET ORAL at 08:36

## 2024-03-23 RX ADMIN — LEVALBUTEROL HYDROCHLORIDE 1.25 MG: 1.25 SOLUTION RESPIRATORY (INHALATION) at 15:47

## 2024-03-23 RX ADMIN — CHLORHEXIDINE GLUCONATE 0.12% ORAL RINSE 15 ML: 1.2 LIQUID ORAL at 22:01

## 2024-03-23 RX ADMIN — SODIUM CHLORIDE SOLN NEBU 3% 4 ML: 3 NEBU SOLN at 15:47

## 2024-03-23 RX ADMIN — IPRATROPIUM BROMIDE 0.5 MG: 0.5 SOLUTION RESPIRATORY (INHALATION) at 19:45

## 2024-03-23 RX ADMIN — POLYETHYLENE GLYCOL 3350 17 G: 17 POWDER, FOR SOLUTION ORAL at 08:35

## 2024-03-23 RX ADMIN — MINOCYCLINE HYDROCHLORIDE 200 MG: 50 TABLET, FILM COATED ORAL at 17:47

## 2024-03-23 RX ADMIN — NYSTATIN: 100000 POWDER TOPICAL at 17:46

## 2024-03-23 RX ADMIN — Medication 2 SPRAY: at 09:08

## 2024-03-23 NOTE — PLAN OF CARE
Problem: Prexisting or High Potential for Compromised Skin Integrity  Goal: Skin integrity is maintained or improved  Description: INTERVENTIONS:  - Identify patients at risk for skin breakdown  - Assess and monitor skin integrity  - Assess and monitor nutrition and hydration status  - Monitor labs   - Assess for incontinence   - Turn and reposition patient  - Assist with mobility/ambulation  - Relieve pressure over bony prominences  - Avoid friction and shearing  - Provide appropriate hygiene as needed including keeping skin clean and dry  - Evaluate need for skin moisturizer/barrier cream  - Collaborate with interdisciplinary team   - Patient/family teaching  - Consider wound care consult   Outcome: Not Progressing     Problem: INFECTION - ADULT  Goal: Absence or prevention of progression during hospitalization  Description: INTERVENTIONS:  - Assess and monitor for signs and symptoms of infection  - Monitor lab/diagnostic results  - Monitor all insertion sites, i.e. indwelling lines, tubes, and drains  - Monitor endotracheal if appropriate and nasal secretions for changes in amount and color  - [place] appropriate cooling/warming therapies per order  - Administer medications as ordered  - Instruct and encourage patient and family to use good hand hygiene technique  - Identify and instruct in appropriate isolation precautions for identified infection/condition  Outcome: Not Progressing     Problem: SAFETY ADULT  Goal: Patient will remain free of falls  Description: INTERVENTIONS:  - Educate patient/family on patient safety including physical limitations  - Instruct patient to call for assistance with activity   - Consult OT/PT to assist with strengthening/mobility   - Keep Call bell within reach  - Keep bed low and locked with side rails adjusted as appropriate  - Keep care items and personal belongings within reach  - Initiate and maintain comfort rounds  - Make Fall Risk Sign visible to staff  - Offer  Toileting every 2 Hours, in advance of need  - Initiate/Maintain bed alarm  - Obtain necessary fall risk management equipment: non skid footwear  - Apply yellow socks and bracelet for high fall risk patients  - Consider moving patient to room near nurses station  Outcome: Not Progressing     Problem: RESPIRATORY - ADULT  Goal: Achieves optimal ventilation and oxygenation  Description: INTERVENTIONS:  - Assess for changes in respiratory status  - Assess for changes in mentation and behavior  - Position to facilitate oxygenation and minimize respiratory effort  - Oxygen administered by appropriate delivery if ordered  - Initiate smoking cessation education as indicated  - Encourage broncho-pulmonary hygiene including cough, deep breathe, Incentive Spirometry  - Assess the need for suctioning and aspirate as needed  - Assess and instruct to report SOB or any respiratory difficulty  - Respiratory Therapy support as indicated  Outcome: Not Progressing     Problem: SKIN/TISSUE INTEGRITY - ADULT  Goal: Skin Integrity remains intact(Skin Breakdown Prevention)  Description: Assess:  -Perform Flavio assessment every shift   -Clean and moisturize skin every day   -Inspect skin when repositioning, toileting, and assisting with ADLS  -Assess under medical devices such as ronny  every hour   -Assess extremities for adequate circulation and sensation     Bed Management:  -Have minimal linens on bed & keep smooth, unwrinkled  -Change linens as needed when moist or perspiring  -Avoid sitting or lying in one position for more than 2 hours while in bed  -Keep HOB at 45 degrees     Toileting:  -Offer bedside commode  -Assess for incontinence every hour  -Use incontinent care products after each incontinent episode such as moisture barrier cream     Activity:  -Mobilize patient 3 times a day  -Encourage activity and walks on unit  -Encourage or provide ROM exercises   -Turn and reposition patient every 2 Hours  -Use appropriate  equipment to lift or move patient in bed  -Instruct/ Assist with weight shifting every hour when out of bed in chair  -Consider limitation of chair time 2 hour intervals    Skin Care:  -Avoid use of baby powder, tape, friction and shearing, hot water or constrictive clothing  -Relieve pressure over bony prominences using allevyn   -Do not massage red bony areas    Next Steps:  -Teach patient strategies to minimize risks such as weight shifting    -Consider consults to  interdisciplinary teams such as PT  Outcome: Not Progressing  Goal: Incision(s), wounds(s) or drain site(s) healing without S/S of infection  Description: INTERVENTIONS  - Assess and document dressing, incision, wound bed, drain sites and surrounding tissue  - Provide patient and family education  Outcome: Not Progressing  Goal: Pressure injury heals and does not worsen  Description: Interventions:  - Implement low air loss mattress or specialty surface (Criteria met)  - Apply silicone foam dressing  - Apply fecal or urinary incontinence containment device - Utilize friction reducing device or surface for transfers   - Con  - Consider nutrition services referral as needed  Outcome: Not Progressing     Problem: Potential for Falls  Goal: Patient will remain free of falls  Description: INTERVENTIONS:  - Educate patient/family on patient safety including physical limitations  - Instruct patient to call for assistance with activity   - Consult OT/PT to assist with strengthening/mobility   - Keep Call bell within reach  - Keep bed low and locked with side rails adjusted as appropriate  - Keep care items and personal belongings within reach  - Initiate and maintain comfort rounds  - Make Fall Risk Sign visible to staff  - Offer Toileting every 2 Hours, in advance of need  - Initiate/Maintain bed alarm  - Obtain necessary fall risk management equipment: non skid footwear  - Apply yellow socks and bracelet for high fall risk patients  - Consider moving  patient to room near nurses station  Outcome: Not Progressing     Problem: Nutrition/Hydration-ADULT  Goal: Nutrient/Hydration intake appropriate for improving, restoring or maintaining nutritional needs  Description: Monitor and assess patient's nutrition/hydration status for malnutrition. Collaborate with interdisciplinary team and initiate plan and interventions as ordered.  Monitor patient's weight and dietary intake as ordered or per policy. Utilize nutrition screening tool and intervene as necessary. Determine patient's food preferences and provide high-protein, high-caloric foods as appropriate.     INTERVENTIONS:  - Monitor oral intake, urinary output, labs, and treatment plans  - Assess nutrition and hydration status and recommend course of action  - Evaluate amount of meals eaten  - Assist patient with eating if necessary   - Allow adequate time for meals  - Recommend/ encourage appropriate diets, oral nutritional supplements, and vitamin/mineral supplements  - Order, calculate, and assess calorie counts as needed  - Recommend, monitor, and adjust tube feedings and TPN/PPN based on assessed needs  - Assess need for intravenous fluids  - Provide specific nutrition/hydration education as appropriate  - Include patient/family/caregiver in decisions related to nutrition  Outcome: Not Progressing     Problem: COPING  Goal: Pt/Family able to verbalize concerns and demonstrate effective coping strategies  Description: INTERVENTIONS:  - Assist patient/family to identify coping skills, available support systems and cultural and spiritual values  - Provide emotional support, including active listening and acknowledgement of concerns of patient and caregivers  - Reduce environmental stimuli, as able  - Provide patient education  - Assess for spiritual pain/suffering and initiate spiritual care, including notification of Pastoral Care or nat[place] based community as needed  - Assess effectiveness of coping  strategies  Outcome: Not Progressing  Goal: Will report anxiety at manageable levels  Description: INTERVENTIONS:  - Administer medication as ordered  - Teach and encourage coping skills  - Provide emotional support  - Assess patient/family for anxiety and ability to cope  Outcome: Not Progressing     Problem: BEHAVIOR  Goal: Pt/Family maintain appropriate behavior and adhere to behavioral management agreement, if implemented  Description: INTERVENTIONS:  - Assess the family dynamic   - Encourage verbalization of thoughts and concerns in a socially appropriate manner  - Assess patient/family's coping skills and non-compliant behavior (including use of illegal substances).  - Utilize positive, consistent limit setting strategies supporting safety of patient, staff and others  - Initiate consult with Case Management, Spiritual Care or other ancillary services as appropriate  - If a patient's/visitor's behavior jeopardizes the safety of the patient, staff, or others, refer to organization procedure.   - Notify Security of behavior or suspected illegal substances which indicate the need for search of the patient and/or belongings  - Encourage participation in the decision making process about a behavioral management agreement; implement if patient meets criteria  Outcome: Not Progressing     Problem: NEUROSENSORY - ADULT  Goal: Achieves stable or improved neurological status  Description: INTERVENTIONS  - Monitor and report changes in neurological status  - Monitor vital signs such as temperature, blood pressure, glucose, and any other labs ordered   - Initiate measures to prevent increased intracranial pressure  - Monitor for seizure activity and implement precautions if appropriate      Outcome: Not Progressing  Goal: Achieves maximal functionality and self care  Description: INTERVENTIONS  - Monitor swallowing and airway patency with patient fatigue and changes in neurological status  - Encourage and assist patient  to increase activity and self care.   - Encourage visually impaired, hearing impaired and aphasic patients to use assistive/communication devices  Outcome: Not Progressing     Problem: CARDIOVASCULAR - ADULT  Goal: Maintains optimal cardiac output and hemodynamic stability  Description: INTERVENTIONS:  - Monitor I/O, vital signs and rhythm  - Monitor for S/S and trends of decreased cardiac output  - Administer and titrate ordered vasoactive medications to optimize hemodynamic stability  - Assess quality of pulses, skin color and temperature  - Assess for signs of decreased coronary artery perfusion  - Instruct patient to report change in severity of symptoms  Outcome: Not Progressing  Goal: Absence of cardiac dysrhythmias or at baseline rhythm  Description: INTERVENTIONS:  - Continuous cardiac monitoring, vital signs, obtain 12 lead EKG if ordered  - Administer antiarrhythmic and heart rate control medications as ordered  - Monitor electrolytes and administer replacement therapy as ordered  Outcome: Not Progressing     Problem: GASTROINTESTINAL - ADULT  Goal: Minimal or absence of nausea and/or vomiting  Description: INTERVENTIONS:  - Administer IV fluids if ordered to ensure adequate hydration  - Maintain NPO status until nausea and vomiting are resolved  - Nasogastric tube if ordered  - Administer ordered antiemetic medications as needed  - Provide nonpharmacologic comfort measures as appropriate  - Advance diet as tolerated, if ordered  - Consider nutrition services referral to assist patient with adequate nutrition and appropriate food choices  Outcome: Not Progressing  Goal: Maintains or returns to baseline bowel function  Description: INTERVENTIONS:  - Assess bowel function  - Encourage oral fluids to ensure adequate hydration  - Administer IV fluids if ordered to ensure adequate hydration  - Administer ordered medications as needed  - Encourage mobilization and activity  - Consider nutritional services  referral to assist patient with adequate nutrition and appropriate food choices  Outcome: Not Progressing  Goal: Maintains adequate nutritional intake  Description: INTERVENTIONS:  - Monitor percentage of each meal consumed  - Identify factors contributing to decreased intake, treat as appropriate  - Assist with meals as needed  - Monitor I&O, weight, and lab values if indicated  - Obtain nutrition services referral as needed  Outcome: Not Progressing  Goal: Establish and maintain optimal ostomy function  Description: INTERVENTIONS:  - Assess bowel function  - Encourage oral fluids to ensure adequate hydration  - Administer IV fluids if ordered to ensure adequate hydration   - Administer ordered medications as needed  - Encourage mobilization and activity  - Nutrition services referral to assist patient with appropriate food choices  - Assess stoma site  - Consider wound care consult   Outcome: Not Progressing  Goal: Oral mucous membranes remain intact  Description: INTERVENTIONS  - Assess oral mucosa and hygiene practices  - Implement preventative oral hygiene regimen  - Implement oral medicated treatments as ordered  - Initiate Nutrition services referral as needed  Outcome: Not Progressing     Problem: GENITOURINARY - ADULT  Goal: Maintains or returns to baseline urinary function  Description: INTERVENTIONS:  - Assess urinary function  - Encourage oral fluids to ensure adequate hydration if ordered  - Administer IV fluids as ordered to ensure adequate hydration  - Administer ordered medications as needed  - Offer frequent toileting  - Follow urinary retention protocol if ordered  Outcome: Not Progressing  Goal: Urinary catheter remains patent  Description: INTERVENTIONS:  - Assess patency of urinary catheter  - If patient has a chronic marie, consider changing catheter if non-functioning  - Follow guidelines for intermittent irrigation of non-functioning urinary catheter  Outcome: Not Progressing     Problem:  METABOLIC, FLUID AND ELECTROLYTES - ADULT  Goal: Electrolytes maintained within normal limits  Description: INTERVENTIONS:  - Monitor labs and assess patient for signs and symptoms of electrolyte imbalances  - Administer electrolyte replacement as ordered  - Monitor response to electrolyte replacements, including repeat lab results as appropriate  - Instruct patient on fluid and nutrition as appropriate  Outcome: Not Progressing  Goal: Fluid balance maintained  Description: INTERVENTIONS:  - Monitor labs   - Monitor I/O and WT  - Instruct patient on fluid and nutrition as appropriate  - Assess for signs & symptoms of volume excess or deficit  Outcome: Not Progressing  Goal: Glucose maintained within target range  Description: INTERVENTIONS:  - Monitor Blood Glucose as ordered  - Assess for signs and symptoms of hyperglycemia and hypoglycemia  - Administer ordered medications to maintain glucose within target range  - Assess nutritional intake and initiate nutrition service referral as needed  Outcome: Not Progressing     Problem: HEMATOLOGIC - ADULT  Goal: Maintains hematologic stability  Description: INTERVENTIONS  - Assess for signs and symptoms of bleeding or hemorrhage  - Monitor labs  - Administer supportive blood products/factors as ordered and appropriate  Outcome: Not Progressing     Problem: MUSCULOSKELETAL - ADULT  Goal: Maintain or return mobility to safest level of function  Description: INTERVENTIONS:  - Assess patient's ability to carry out ADLs; assess patient's baseline for ADL function and identify physical deficits which impact ability to perform ADLs (bathing, care of mouth/teeth, toileting, grooming, dressing, etc.)  - Assess/evaluate cause of self-care deficits   - Assess range of motion  - Assess patient's mobility  - Assess patient's need for assistive devices and provide as appropriate  - Encourage maximum independence but intervene and supervise when necessary  - Involve family in  performance of ADLs  - Assess for home care needs following discharge   - Consider OT consult to assist with ADL evaluation and planning for discharge  - Provide patient education as appropriate  Outcome: Not Progressing

## 2024-03-23 NOTE — SPEECH THERAPY NOTE
Speech/Language Pathology Progress Note    Patient Name: Carla Hunt  Today's Date: 3/23/2024    Subjective:  Pt able to wake to verbal cueing.  and son present in room, as well as RN.    Objective:  Pt seen for speech therapy to re assess potential for PMV use. Inner cannula was removed and cuff was already deflated on her #8.0 cuffed Shiley. Wet breath sounds noted with initial finger occlusion. Pt was cued to cough, and RN provided trach suctioning. Pt appeared to have slightly improved airflow around trach today, however increased coughing was again seen and no voicing was achieved. Respiratory rate began to increase with finger occlusion and trials were discontinued. New inner cannula placed and pt remained on trach collar O2.    Assessment:  Slight appearing improvement in airflow around trach however still no voicing achieved. Not yet appropriate for placement of PMV.     Plan/Recommendations:  No PMV yet. Finger occlusion trials with SLP, RN, or RT. F/u with BSE per physician request.

## 2024-03-23 NOTE — SPEECH THERAPY NOTE
Speech-Language Pathology Bedside Swallow Evaluation    Patient Name: Carla Hunt    Today's Date: 3/23/2024     Problem List  Principal Problem:    Acute respiratory failure with hypoxia (HCC)  Active Problems:    Anxiety state    Encephalopathy    COVID    Stage 3b chronic kidney disease (CKD) (HCC)    Teto marginal zone B-cell lymphoma (HCC)    Seizure disorder (HCC)    Hypotension    Palliative care patient    Goals of care, counseling/discussion    Acute kidney injury (HCC)    Cecal volvulus (HCC)    Past Medical History  Past Medical History:   Diagnosis Date    Hx of hypercalcemia     Lymphoma (HCC) 2021    Parathyroid disease (HCC)     Seizures (HCC)     Situational depression     24 yr old son  from drug overdose     Past Surgical History  Past Surgical History:   Procedure Laterality Date    CRANIOTOMY FOR TEMPORAL LOBECTOMY Left     VA LAPS ABD PRTM&OMENTUM DX W/WO SPEC BR/WA SPX N/A 2024    Procedure: LAPAROSCOPY DIAGNOSTIC,EXPLORATORY LAPAROTOMY, RIGHT KARY COLECTOMY,ILEOSTOMY, MUCUS FISTULA;  Surgeon: Lauryn Ullrich, DO;  Location: [facility];  Service: General       Summary  Pt was initially admitted 1/10/24 and has since had multiple intubations/prolonged intubation and now presents with #8.0 cuffed Shiley trach. She is unable to tolerate PMV at this time. Nutritional needs are being met via NGT. She has known dysphagia prior to trach, and plan will be for MBS when cleared from covid precautions. Pt was seen at bedside to determine current potential for participation in MBS.   She presented with s/s suggestive of moderate oral and suspected moderate-severe pharyngeal dysphagia.  Symptoms or concerns included decreased bolus acceptance and manipulation, suspected pharyngeal swallow delay, suspected decreased hyolaryngeal elevation upon palpation, and multiple swallows. Multiple, rapid, ? incomplete swallows noted per bolus, followed by delayed non productive coughing with the  limited trials provided today (ice chip x2, 1/2 tsp HTL). Continue NPO now. Discussed with pt's spouse, RN, and physician possible need for PEG tube to maximize rehab potential.     Risk/s for Aspiration: high    Recommended Diet: NPO with tube feeds   Recommended Form of Meds: non-oral if possible   Aspiration precautions and swallowing strategies: upright posture  Other Recommendations: Continue frequent oral care, MBS when appropriate, ENT for assessment of VF closure/airway protection.       Current Medical Status per critical care progress note 3/23/24   Assessment:  Acute hypoxic respiratory failure ventilator dependent- s/p tracheostomy  Bilateral ground glass opacities- likely persistent PNA and superimposed bacterial infection  Persistent COVID Pna with superimposed bacterial pna- w/ stenotrophomonas  Acute blood loss anemia- from stoma site  Hemorrhagic shock  Severe encephalopathy- possibly due to uremia  Cutaneous ulcer- below the trach site, likely due to pressure.   Stenotrophomonas PNA  Sepsis- 2/2 persistent pneumonia  Volvulus- post hemicolectomy s/p cecal ostomy. Noted stomal retraction  Shock- unclear etiology  Thrombocytopenia  Bcell lymphoma- on rituximab  Steroid induced hyperglycemia  Minimal SAH POA  Seizure disorder  Upper extremity thrombophlebitis  Severe lymphopenia- zero lymphs on diff  Hx of migraines- s/p left temporal lobectomy  Hypogammaglobulinemia  Critical illness myopathy  Plan:  Repeat chest xray much improved  Continue to wean trach collar currently on 8L/35%  ID following,  minocycline until 03/26  PT/OT again, patient improving  Speech/swallow eval. Assess for any advancement  Continue tube feeds and free water flushes  Hold off on any further boluses  F/up CD4 and anti-ritux antibody  Wound care for stoma and peritracheal skin necrosis/ulceration  Nephrology and general surgery on board  Place cynthia, monroe multiple straight caths  Continue to wean steroids by 10mg q3  days  Will need modified barium swallow  Although CRP has increased, this is likely due to intra-abdominal inflammation especially since the CXR continues to show improvement which coincides with her clinical improvement    Initially admitted 1/10/24 H&P:  Carla Hutn is a 58 y.o. who presents on 1/8 with a complaint of abdominal pain. She has a past medical history of chronic kidney disease stage 3 with baseline creatinine 1-1.3, epilepsy on Topamax and Lamictal, hyperlipidemia, left anterior temproal lobe resection in 2007, a history of B-cell lymphoma on chemotherapy, and recent treatment outpatient for a urinary tract infection. In the emergency room there she was found to be COVID positive with symptoms concerning for stroke. Her initial CT showed a possible subarachnoid however given the size felt okay to be managed at the [facility]. Her neurologic work-up was notable for a possible lacunar infarct and had a negative routine EEG. Despite treatment for both COVID and possible seizures, the patient did not havei mprovement in mental status and worsening respiratory status. She was started on COVID protocol treatments, receiving Decadron/full-strength Lovenox/Actemra on 1/9. She was additionally started on remdesivir. After discussion with neurology, there was high clinical suspicion for intracranial infection  and a lumbar puncture was recommended. She was also started on antibiotics for possible meningitis. Burton mental status continued to worsen and she was referred to the [facility] for video EEG.        Special Studies:  CXR 3/22/24 IMPRESSION:  Mild congestive changes.      Social/Education/Vocational Hx:  Pt lives with family    Swallow Information   Current Risks for Dysphagia & Aspiration: recent intubation, prolonged intubation, and new trach  Current Diet: NPO with tube feeds   Baseline Diet: regular diet and thin liquids prior to hospitalization      Baseline Assessment    Behavior/Cognition: waxing and waning arousal level  Speech/Language Status: able to follow commands inconsistently and limited verbal output  Patient Positioning: upright in bed  Pain Status/Interventions/Response to Interventions: No report of or nonverbal indications of pain.       Swallow Mechanism Exam  Facial: symmetrical  Labial: WFL  Lingual: decreased coordination  Velum: symmetrical  Mandible: adequate ROM  Dentition: adequate  Vocal quality:aphonic   Volitional Cough: weak   Respiratory Status: on trach collar  Tracheostomy: Shiley #8 cuffed      Consistencies Assessed and Performance   Consistencies Administered: ice chips and honey thick    Oral Stage: moderate, decreased bolus propulsion, and decreased bolus formation    Pharyngeal Stage: moderate-severe, suspected pharyngeal swallow delay, suspected decreased hyolaryngeal elevation upon palpation, multiple swallows, and effortful swallow. Delayed coughing noted after trials.    Esophageal Concerns: none reported      Summary and Recommendations (see above)    Results Reviewed with: patient, RN, MD, and family     Treatment Recommended: yes     Frequency of treatment: 3-5x/week      Dysphagia LTG  -Patient will demonstrate safe and effective oral intake (without overt s/s significant oral/pharyngeal dysphagia including s/s penetration or aspiration) for the highest appropriate diet level.     Short Term Goals:  -Pt will tolerate the least restrictive diet with the least restrictive liquid consistency without s/s of aspiration x72hrs.    -Patient will tolerate trials of upgraded food and/or liquid texture with no significant s/s of oral or pharyngeal dysphagia including aspiration across 1-3 diagnostic sessions.    -Patient will comply with a Video/Modified Barium Swallow study for more complete assessment of swallowing anatomy/physiology/aspiration risk and to assess efficacy of treatment techniques so as to best guide treatment plan.

## 2024-03-23 NOTE — PROGRESS NOTES
NEPHROLOGY PROGRESS NOTE   Carla Hunt 59 y.o. female MRN: [id number]  Unit/Bed#: MICU 12 Encounter: [id number]  Reason for Consult: Acute kidney injury    ASSESSMENT/PLAN:  Acute kidney injury, etiology multifactorial, likely component due to ATN given hemodynamic fluctuations, worsening anemia and previous episodic ELIESER.  Additional component was likely secondary to Bactrim use with impaired creatinine secretion.  Renal function has continued to improve, remains nonoliguric  Hypernatremia, continue with free water may require additional D5W.  Metabolic acidosis, stable no changes in her current regimen  Acute on chronic anemia status post PRBC transfusion previous serum and urine electrophoresis negative for monoclonal gammopathy  The stomal site bleeding with bedside procedure with control of bleeding/and adequate hemostasis  Hypoxic respiratory failure, remains on trach collar  Recent volvulus status post hemicolectomy with cecal ostomy  B-cell lymphoma, previously on rituximab therapy  Encephalopathy with recent subarachnoid hemorrhage and history of seizure disorder.      Renal function overall appears stable to creatinine 1.12  Remains nonoliguric  Continue with free water flushes may need to consider D5W if sodium level continues to worsen  Acidosis stable with a bicarbonate of 21  No changes from nephrology standpoint    SUBJECTIVE:  Seen and examined.  Patient awakens to stimuli.  No acute distress noted.  Appears to be on trach collar.    Review of Systems    OBJECTIVE:  Current Weight: Weight - Scale: 78.1 kg (172 lb 2.9 oz)  Vitals:    03/23/24 0300 03/23/24 0400 03/23/24 0500 03/23/24 0600   BP: 129/74 120/63 142/76 145/76   BP Location:       Pulse: (!) 129 (!) 121 (!) 124 (!) 126   Resp: 20 16 17 16   Temp:  98.4 °F (36.9 °C)     TempSrc:  Oral     SpO2: 99% 99% 99% 99%   Weight:       Height:           Intake/Output Summary (Last 24 hours) at 3/23/2024 0908  Last data filed at 3/23/2024  0831  Gross per 24 hour   Intake 2885.53 ml   Output 2254 ml   Net 631.53 ml       Physical Exam  Constitutional:       Appearance: She is not ill-appearing.   Cardiovascular:      Rate and Rhythm: Normal rate and regular rhythm.   Pulmonary:      Effort: Pulmonary effort is normal.      Breath sounds: Rhonchi present.   Abdominal:      General: There is no distension.      Palpations: Abdomen is soft.      Tenderness: There is no abdominal tenderness.   Musculoskeletal:      Right lower leg: Edema present.      Left lower leg: Edema present.   Skin:     General: Skin is warm and dry.      Findings: No rash.   Neurological:      Mental Status: She is alert. Mental status is at baseline.         Medications:    Current Facility-Administered Medications:     acetaminophen (TYLENOL) tablet 650 mg, 650 mg, Oral, Q6H PRN, Moises Bowden MD    chlorhexidine (PERIDEX) 0.12 % oral rinse 15 mL, 15 mL, Mouth/Throat, Q12H SARTHAK, Moises Bowden MD, 15 mL at 03/23/24 0835    enoxaparin (LOVENOX) subcutaneous injection 40 mg, 40 mg, Subcutaneous, Q24H SARTHAK, Joe Lazcano DO, 40 mg at 03/23/24 0835    fentaNYL injection 50 mcg, 50 mcg, Intravenous, Q1H PRN, Moises Bowden MD, 50 mcg at 03/21/24 1344    insulin regular (HumuLIN R,NovoLIN R) 1 Units/mL in sodium chloride 0.9 % 100 mL infusion, 0.3-21 Units/hr, Intravenous, Titrated, Joe Lazcano DO, Last Rate: 6 mL/hr at 03/23/24 0831, 6 Units/hr at 03/23/24 0831    ipratropium (ATROVENT) 0.02 % inhalation solution 0.5 mg, 0.5 mg, Nebulization, TID, Moises Bowden MD, 0.5 mg at 03/23/24 0819    lamoTRIgine (LaMICtal) tablet 150 mg, 150 mg, Oral, BID, Moises oBwden MD, 150 mg at 03/23/24 0835    levalbuterol (XOPENEX) inhalation solution 1.25 mg, 1.25 mg, Nebulization, TID, Moises Bowden MD, 1.25 mg at 03/23/24 0819    [COMPLETED] methylPREDNISolone sodium succinate (Solu-MEDROL) injection 50 mg, 50 mg, Intravenous, Q6H SARTHAK, 50 mg at 03/16/24 4470 **FOLLOWED BY** [COMPLETED] methylPREDNISolone sodium succinate  (Solu-MEDROL) injection 40 mg, 40 mg, Intravenous, Q6H SARTHAK, 40 mg at 03/19/24 1756 **FOLLOWED BY** [COMPLETED] methylPREDNISolone sodium succinate (Solu-MEDROL) injection 30 mg, 30 mg, Intravenous, Q8H SARTHAK, 30 mg at 03/21/24 2246 **FOLLOWED BY** [DISCONTINUED] methylPREDNISolone sodium succinate (Solu-MEDROL) injection 20 mg, 20 mg, Intravenous, Q6H SARTHAK **FOLLOWED BY** [DISCONTINUED] methylPREDNISolone sodium succinate (Solu-MEDROL) injection 10 mg, 10 mg, Intravenous, Q6H SARTHAK **FOLLOWED BY** [DISCONTINUED] methylPREDNISolone sodium succinate (Solu-MEDROL) injection 10 mg, 10 mg, Intravenous, Q8H SARTHAK **FOLLOWED BY** [DISCONTINUED] methylPREDNISolone sodium succinate (Solu-MEDROL) injection 10 mg, 10 mg, Intravenous, Q12H SARTHAK **FOLLOWED BY** [DISCONTINUED] methylPREDNISolone sodium succinate (Solu-MEDROL) injection 10 mg, 10 mg, Intravenous, Daily **FOLLOWED BY** methylPREDNISolone sodium succinate (Solu-MEDROL) injection 30 mg, 30 mg, Intravenous, Q12H SARTHAK, 30 mg at 03/23/24 0834 **FOLLOWED BY** [START ON 3/25/2024] methylPREDNISolone sodium succinate (Solu-MEDROL) injection 20 mg, 20 mg, Intravenous, Q12H SARTHAK **FOLLOWED BY** [START ON 3/28/2024] methylPREDNISolone sodium succinate (Solu-MEDROL) injection 10 mg, 10 mg, Intravenous, Q12H SARTHAK **FOLLOWED BY** [START ON 3/31/2024] methylPREDNISolone sodium succinate (Solu-MEDROL) injection 10 mg, 10 mg, Intravenous, Daily, Emilie Soyeon Kim, MD    minocycline (DYNACIN) tablet 200 mg, 200 mg, Per NG Tube, BID, Betzaida Sr MD, 200 mg at 03/23/24 0837    modafinil (PROVIGIL) tablet 100 mg, 100 mg, Per NG Tube, Daily, Moises Bowden MD, 100 mg at 03/23/24 0835    nystatin (MYCOSTATIN) powder, , Topical, BID, Moises Bowden MD, Given at 03/23/24 0838    ondansetron (ZOFRAN) injection 4 mg, 4 mg, Intravenous, Q6H PRN, Moises Bowden MD    pantoprazole (PROTONIX) injection 40 mg, 40 mg, Intravenous, Q12H SARTHAK, Moises Bowden MD, 40 mg at 03/23/24 0881    polyethylene glycol (MIRALAX)  packet 17 g, 17 g, Per NG Tube, Daily, Moises Bowden MD, 17 g at 03/23/24 0835    sodium chloride (AYR SALINE NASAL) nasal gel 1 Application, 1 Application, Nasal, Q1H PRN, Moises Bowden MD    sodium chloride (OCEAN) 0.65 % nasal spray 2 spray, 2 spray, Each Nare, Q4H, Moises Bowden MD, 2 spray at 03/23/24 0908    sodium chloride 3 % inhalation solution 4 mL, 4 mL, Nebulization, TID, Moises Bowden MD, 4 mL at 03/23/24 0819    topiramate (TOPAMAX) 6 mg/ml oral suspension 50 mg, 50 mg, Per PEG Tube, BID, Moises Bowden MD, 50 mg at 03/23/24 0906    venlafaxine (EFFEXOR) tablet 12.5 mg, 12.5 mg, Oral, TID With Meals, Moises Bowden MD, 12.5 mg at 03/23/24 0836    Laboratory Results:  Results from last 7 days   Lab Units 03/23/24  0519 03/22/24  0524 03/21/24  1332 03/21/24  0850 03/21/24  0557 03/21/24  0552 03/21/24  0034 03/20/24  1606 03/20/24  1333 03/20/24  1048 03/20/24  0831 03/20/24  0827 03/20/24  0502 03/20/24  0457 03/19/24  0543 03/18/24  0533 03/18/24  0429 03/17/24  0800 03/17/24  0451   WBC Thousand/uL 2.43* 3.89* 5.30  --   --  4.87  --   --   --  7.09  --   --   --  7.88 9.92  --  8.39  8.39  --  11.78*   HEMOGLOBIN g/dL 9.7* 9.8* 9.4* 10.0*  --  9.6* 10.0* 10.4*  --  11.6  --    < >  --  9.0* 9.5*   < > 5.6*  5.6*   < > 6.8*   I STAT HEMOGLOBIN g/dl  --   --   --   --   --   --   --   --   --   --  6.8*  --   --   --   --   --   --   --   --    HEMATOCRIT % 29.9* 28.6* 27.9* 29.9*  --  28.5* 30.0* 30.8*  --  34.5*  --    < >  --  27.6* 28.1*   < > 17.6*  17.6*  --  21.5*   HEMATOCRIT, ISTAT %  --   --   --   --   --   --   --   --   --   --  20*  --   --   --   --   --   --   --   --    PLATELETS Thousands/uL 68* 77* 82*  --   --  53*  --   --   --  72*  --   --   --  85* 86*  --  89*  89*  --  124*   POTASSIUM mmol/L 4.8 4.0 4.4  --   --  4.3  --   --  4.8  --   --   --  4.9  --  4.7  --  4.1  --  4.3   CHLORIDE mmol/L 116* 113* 111*  --   --  110*  --   --  112*  --   --   --  108  --  107  --  107  --  105   CO2  mmol/L 21 20* 19*  --   --  21  --   --  15*  --   --   --  18*  --  17*  --  18*  --  19*   CO2, I-STAT mmol/L  --   --   --   --   --   --   --   --   --   --  15*  --   --   --   --   --   --   --   --    BUN mg/dL 75* 77* 77*  --   --  79*  --   --  87*  --   --   --  88*  --  85*  --  81*  --  71*   CREATININE mg/dL 1.12 1.33* 1.38*  --   --  1.41*  --   --  1.54*  --   --   --  1.63*  --  1.79*  --  1.89*  --  1.65*   CALCIUM mg/dL 8.8 8.8 8.3*  --   --  8.3*  --   --  8.7  --   --   --  8.7  --  8.4  --  8.0*  --  8.2*   MAGNESIUM mg/dL 2.5 2.3  --   --  2.2  --   --   --   --   --   --   --   --  2.4 2.3  --  2.3  --  2.2   PHOSPHORUS mg/dL 4.8* 5.9*  --   --  5.4*  --   --   --   --   --   --   --   --  5.2* 4.0  --  4.0  --  3.6   GLUCOSE, ISTAT mg/dl  --   --   --   --   --   --   --   --   --   --  118  --   --   --   --   --   --   --   --     < > = values in this interval not displayed.

## 2024-03-23 NOTE — PROGRESS NOTES
[facility]  Progress Note: Critical Care  Name: Carla Hunt 59 y.o. female I MRN: [id number]  Unit/Bed#: MICU 12 I Date of Admission: 1/10/2024   Date of Service: 3/23/2024 I Hospital Day: 73    Assessment/Plan   Acute hypoxic respiratory failure;; s/p tracheostomy 3/12; intermittently requiring vent  Bilateral ground glass opacities, persistent, improving  Persistent COVID-19 infection; s/p 14d course of antivirals completed 3/15, superimpoved by #4  Stenotrophomonas pneumonia; recurrent, previously on Bactrim, switched to minocycline 2/2 #7 through 3/26  Acute on chronic anemia- multifactorial-blood loss from ostomy site now resolved s/p suturing of bleeding vessel, chronic inflammation, iatrogenic, marrow dysfunction in setting of persistent inflammation likely also contributing  Severe Metabolic encephalopathy, multifactorial including #8, improving  ELIESER, pre-renal, on CKD3, improving  Uremia  Thrombocytopenia; s/p 1U plt, improving  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; complicated by #12  Wound dehiscence 2/2 poor wound healing s/p wound vac 3/13   B-Cell lymphoma, s/p chemotheraphy 2022, Rituxan maintenance therapy on hold  Minimal SAH POA, no interventions required, resolved on repeat CTHS  Seizure disorder; on home AEDs  Steroid induced hyperglycemia; controlled on insulin gtt  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  -Most recent repeat CTH 3/13 negative for acute intracranial findings.  Has had extensive neurological workup  including MRI/CT neuroimaging, LP with unremarkable findings  -Etiology unclear. Has been off sedation. Electrolytes, sodium, BS at goal. Ammonia normal. May be prolonged 2/2 PNA, possibly worsened by uremic encephalopathy worsened by ELIESER, ?UGIB, however GFR not <15; candida growth seen on BAL Cx from 3/11, may be respiraotry colonization, however fungal infection not ruled in setting of severe lymphopenia, depressed immunoglobulins.      Plan:  -Avoid PRN fentanyl/sedative meds as able.  -Continue modafinil  100mg qAM to stimulate arousal per palliative    -Continue to wean steroids: currently 30 BID day 2/3.   - Thereafter: Wean to 20 mg BID x 3 days, 10 mg BID x 3 days, 10 mg daily x 3 days.  -ABX tx with PNA as below  -Frequent neurochecks   -Can consider MRI brain to evaluate for eval for encephalitis/inflammatory process, however may not   -Video EEG deferred at thist time due to low suspicion for seizures. Less likely non-convulsive status epilepticus as patient is on home AEDs.     Diagnosis: seizure disorder, known history  -S/p partial left temporal lobe resection in 2007 2/2 ?complex migraines  -On Lamictal 150 bid and Topamax 50mg bid  -Last lamotrigine level from 03/02 at 10.7 (therapeutic)     Diagnosis: Anxiety, known history  -On Effexor 12.5 mg TID     CV:  -Diagnosis: Sinus tachycardia  -In setting of blood loss anemia/hypovolemia, improving with PRBC transfusions and IVF  -TTE 3/17 with no major structural dysfunction  -May be 2/2 dehydration/hypvol, acute on chronic anemia, underlying infectious process, pain, less likely Modafinal-induced as patient was persistently tachy prior   Plan:  - See plans under Pulm, Heme/Onc, ID  - 1L isolyte bolus 3/23        Pulm:  Diagnosis: Acute hypoxic respiratory failure  Diagnosis: Bilateral ground glass opacities, persistent  -Vent dependent; s/p trach 3/12 after was reintubated 3/11 due to increased WOB,elevated CRP  -Persistently COVID+ s/p  multiple courses of antivirals (most recent completed 3/15). Complicated by recurrent bacterial PNA treated w 10d levaquin (completed 2/25) for MDR PNA; most recent BAL +recurrent stenotrophomona treated with Bactrim, swithced to minocyline today 2/2 ELIESER  BAL also with 4+ candida albican; serum fungitell elevated  -Etiology Likely multifactorial including possible COVID pneumonitis vs recurrent PNA vs hypersensitivity pneumonitis 2/2 ?rituxumab. Persistent inflammation likely given patient has been steroid responsive throughout admission. CRP continues downward trend. Lack of clinical improvement despite steroids, ABx, antivirals. Fungal source not ruled out in setting of severe lymphopenia, low immunoglobulins.  -Repeat CXR imaging 3/20 with improvement in multifocal bronchopneumonia    Plan:  -Finished 14d course of Paxlovid/remdesivir on 3/15  -Continue minocycline 200mg bid via NGT to tx Stenotrophomonas PNA as below  -Steroid wean- IV Solu-Medrol 30 mg bid today, 20mg  bid tomorrow  -Will hold off on starting empiric antifungal given improvement in neurological exam however may be consideration if clinical status does not improve.  -Follow up BAL viral negative  -Follow up anti-Rituximab ab  - Continue trach collar 16 L midflow        GI:  Diagnosis: Partial cecal volvulus s/p right hemicolectomy with ostomy pouch in place  -Complicated by poor wound healing of midline incision as evidenced by wound dehiscence s/p wound vac that was removed 3/17 by gen surg.  -Stoma with dark brown output, consistent with prior  Plan:  -Wound vac as per general surgery  -Monitor ostomy pouch output  -Surgery following, will keep them updated if any further changes     :  Diagnosis: ELIESER on CKD III  -Baseline Cr  0.8-1.2  -Cr remains elevated but improving today, Cr 1.43, BUN remains elevated but improving  -UO adequate, on Ortiz, draining clear yellow urine  -Prerenal azotemia 2/2 hypovolemia, anemia-?blood loss, likely  exacerbated by bactrim use (increased Cr secretion), however remains off Bactrim x2d with improvement in serum creatinine  Plan:  -Improving, s/p IVF, s/p prbc transfusions for blood loss anemia on 3/19, 3/21; Hgb stable  -Bactrim switched to minocycline, will continue  -Trend daily BMP  -Continue to monitor I/O and UOP  -Avoid nephrotoxins, contrast dye as able     Diagnosis: NAGMA,   -Cl normal, positive urine gap. No loose stools/diarrhea reported  -Ddx include 2/2 uremic acidosis and ELIESER vs RTA2 from Topamax given positive UG. Unlikely RTA1 given absence of hypokalemia and normal serum Cl  Plan:  -s/p sodom bicarb drip overnight, NAGMA currently resolved  -Bactrim switched to minocycline, day 2, with improvement in serum Cr  -Treat underlying uremia, planned EGD next week as per GI to rule out UGIB  -Optimize electrolytes including potassium/monitor hypokalemia  -?  Consider discontinuation of topiramate  -Trend bmp daily     F/E/N:  F: none  E: monitor and replete for goal K>4, P>3, Mg>2  N: tube feeds with vital 1.5; 45 cc/h, Prosource liquid protein to 1 packet BID;  q6h (total 1.2L; FWD 2.8L) increase to q4h     Heme/Onc:  Diagnosis: Acute on chronic anemia  -Baseline Hgb ~7-8. S/P 2U PRBCs 3/17 after repeat Hgb 5.3, total of 6U transfused since admission.  -Patient had significant blood loss from ostomy site 3/20, resulting in hemorraghic shock, improved with blood transfusion; hemostasis achieved after bleeding source identified and sutured. Another large vol danie bloody output from osotomy on 3/21, bleeding source within ostomy site, sutured. S/P 1U prbc.  Repeat H&H stable.   -?Worsened by impaired marrow response liekly 2/2 persistent inflammation due to low retic index ~1 prior to most recent transfusions; normocytic with normal B12/folate levels; MCV<100. Iatrogenic cause likely contributing 2/2 frequent blood draws; chronic anemia likely persistent inflammatory state/CKD/lymphoma in setting  of elevated ferritin of 974. SPEP/UPEP negative. Hemolysis labs negative.   Plan:  -Routine monitoring ostomy output, if bleeding, apply direct pressure and contact gen surg stat.  -Continue tube feeds/nutritional support  ·Trend CBC, transfuse to maintain Hgb>7     Diagnosis: Thrombocytopenia  -Persistent thrombocytopenia likely in setting of infectious state, known history of B-cell lymphoma.  May also have component of marrow dysfunction in setting of persistent inflammatory state. No objective evidence of liver dysfunction. No known history of von Willebrand's disease.  -Is on heparin product however 4T score suggestive of intermiediate probability of HIT at 14%  Plan:  -Treat underlying anemia as per plan noted above  -Lovenox for DVT ppx     Diagnosis Lymphopenia  -In setting of high dose steroids  -Severely depressed immunoglobulins inclding IgG, IgA, Igm  -At risk for further infections  -Contact and airborne precautions  -Follow up CD4 count     Diagnosis: B cell lymphoma  -Follows with heme[facility]onc at [facility]  -S/P 6 cycles of chemotherapy in 20222  -Maintained on Rituxan for 2 year course PTA, started May 2022, last dose was 12/2024, treatment currently on hold in setting of persistent COVID-19 infection  Plan:  -Holding rituximab in setting of persistent COVID-19 infection  -Follow-up CD4 count and percentage  -Follow up anti-Rituximab ab to assess for drug induced hypersensitivity syndrome  -Lymphoma/leukemia flow cytometry      DVT ppx: with lovenox     Endo:  Diagnosis: steroid hyperglycemia and diabetes mellitus type 2, A1c at 6.6 from 01/2024  -Goal -180  -BG range last 24hrs 113-174  Plan:  -On insulin gtt     ID:  Diagnosis: Recurrent bacterial pneumonia  - Patient has completed multiple treatment courses of remdesivir throughout hospitalization in addition to 7 days of ceftriaxone.  - BAL cultures in 2/2024 positive for MDR Pseudomonas and stenotrophomonas treated with 10d course of Levaquin  - CT  C/A/P 3/10 with persistent groundglass opacities and changes suggestive of sequelae of COVID, prior infections.  Completed 10d course of cefepime for broad spectrum coveragge (completed (3/13)  -Repeat BAL cultures from 3/11 showing 4+ growth Stenotrophomonas maltophilia. Could be colonization given prior BAL growth of same, however due to recent intubation with prolonged corticosteroid theraphy, will begin treating as true pathogen. Patient otherwise remains afebrile, no leukocytosis. CRP with down trending.  Previously on Bactrim from 3/13 to 3/18, discontinued due to worsening ELIESER  Plan:  -Continue minocycline 200mg bid, via NGT; hold TF 1hr pre and post minocycline adminstration.  -Plan to treat for 14d course through 3/26 w/ abx  -IV steroids as above     MSK/Skin:  Diagnosis: Midline incision wound with poor wound healing-  -General surgery performed staple removal of the patient's midline incision on 3/12 with some skin signs appreciated at the inferior aspect of the wound without obvious pus drainage. Overnight 3/13, wound dehiscence progressed requiring general surgery reevaluation. Red/brown aspirate noted, fascia intact. Wet-to-dry dressings in place. General surgery following for next Epson wound care.  -Poor wound healing in setting of high-dose steroid therapy.  No  exam no obvious signs of infection at this time.   -S/P wound vac replacement 3/13 as per gen surgery. No active concerns for cellulitis.  Plan:  -Wound VAC management as per general surgery  -Routine monitoring, wound care as per general surgery.     Diagnosis: Critical illness myopathy  -Likely exacerbated by high-dose steroid therapy  Plan:  -Continue to wean steroids as above  -PT/OT following        Disposition: Critical care       ICU Core Measures     Vented Patient  VAP Bundle  VAP bundle ordered     A: Assess, Prevent, and Manage Pain Has pain been assessed? Yes  Need for changes to pain regimen? Yes   B: Both Spontaneous Awakening  Trials (SATs) and Spontaneous Breathing Trials (SBTs) Plan to perform spontaneous awakening trial today? Yes   Plan to perform spontaneous breathing trial today? Yes   Obvious barriers to extubation? No   C: Choice of Sedation RASS Goal: 0 Alert and Calm  Need for changes to sedation or analgesia regimen? No   D: Delirium CAM-ICU: Positive   E: Early Mobility  Plan for early mobility? Yes   F: Family Engagement Plan for family engagement today? Yes       Antibiotic Review: Patient on appropriate coverage based on culture data.  and Infectious disease consulted    Review of Invasive Devices:    Ortiz Plan: Continue for accurate I/O monitoring for 48 hours    Roebling Plan: Discontinue arterial line    Prophylaxis:  VTE VTE covered by:  enoxaparin, Subcutaneous, 40 mg at 03/22/24 1000       Stress Ulcer  covered bypantoprazole (PROTONIX) injection 40 mg [[id number]]        Significant 24hr Events     24hr events: No acute overnight events     Subjective     Review of Systems   Unable to perform ROS: Acuity of condition        Objective                            Vitals I/O      Most Recent Min/Max in 24hrs   Temp 98.4 °F (36.9 °C) Temp  Min: 98.4 °F (36.9 °C)  Max: 99.6 °F (37.6 °C)   Pulse (!) 126 Pulse  Min: 114  Max: 134   Resp 16 Resp  Min: 11  Max: 21   /76 BP  Min: 115/63  Max: 145/76   O2 Sat 99 % SpO2  Min: 95 %  Max: 99 %     Vent: 12/6/40%  Trach  Ortiz  Ostomy (RUQ)  Wound vac   Intake/Output Summary (Last 24 hours) at 3/23/2024 0810  Last data filed at 3/23/2024 0601  Gross per 24 hour   Intake 2592.53 ml   Output 2254 ml   Net 338.53 ml       Diet Enteral/Parenteral; Tube Feeding No Oral Diet; Vital 1.5; Continuous; 45; Prosource Protein Liquid - One Packet; OD; 300; Water; Every 6 hours    Invasive Monitoring           Physical Exam   Physical Exam  Eyes:      Extraocular Movements: Extraocular movements intact.      Pupils: Pupils are equal, round, and reactive to light.   HENT:      Mouth/Throat:       Pharynx: No oropharyngeal exudate.   Neck:      Trachea: Tracheostomy present.      Comments: On 16L  Cardiovascular:      Rate and Rhythm: Regular rhythm. Tachycardia present.   Musculoskeletal:      Right lower le+ Edema present.      Left lower le+ Edema present.   Abdominal: General: There is no distension.      Palpations: Abdomen is soft.      Comments: Black serous liquid in colostomy bag and in wound vac   Constitutional:       Appearance: She is ill-appearing.   Pulmonary:      Effort: Pulmonary effort is normal. No accessory muscle usage or accessory muscle usage.      Breath sounds: Rales present. No wheezing.   Neurological:      Comments: Able to communicate by shaking head yes/no  Awake, alert  Follows commands - hand  b/l   Genitourinary/Anorectal:     Comments: purewick           Diagnostic Studies      EKG: no new  Imaging: repeat CXR 3/22 with improving findings of multifocal pna I have personally reviewed pertinent reports.       Medications:  Scheduled PRN   chlorhexidine, 15 mL, Q12H SARTHAK  enoxaparin, 40 mg, Q24H SARTHAK  ipratropium, 0.5 mg, TID  lamoTRIgine, 150 mg, BID  levalbuterol, 1.25 mg, TID  methylPREDNISolone sodium succinate, 30 mg, Q12H SARTHAK   Followed by  [START ON 3/24/2024] methylPREDNISolone sodium succinate, 20 mg, Q12H SARTHAK   Followed by  [START ON 3/26/2024] methylPREDNISolone sodium succinate, 10 mg, Q12H SARTHAK   Followed by  [START ON 3/28/2024] methylPREDNISolone sodium succinate, 10 mg, Daily  minocycline, 200 mg, BID  modafinil, 100 mg, Daily  multi-electrolyte, 1,000 mL, Once  nystatin, , BID  pantoprazole, 40 mg, Q12H SARTHAK  polyethylene glycol, 17 g, Daily  sodium chloride, 2 spray, Q4H  sodium chloride, 4 mL, TID  topiramate, 50 mg, BID  venlafaxine, 12.5 mg, TID With Meals      acetaminophen, 650 mg, Q6H PRN  fentaNYL, 50 mcg, Q1H PRN  ondansetron, 4 mg, Q6H PRN  sodium chloride, 1 Application, Q1H PRN       Continuous    insulin regular (HumuLIN R,NovoLIN R) 1  Units/mL in sodium chloride 0.9 % 100 mL infusion, 0.3-21 Units/hr, Last Rate: 6 Units/hr (03/23/24 0557)         Labs:    CBC    Recent Labs     03/22/24  0524 03/23/24  0519   WBC 3.89* 2.43*   HGB 9.8* 9.7*   HCT 28.6* 29.9*   PLT 77* 68*   BANDSPCT  --  3      Lymph 0%, seg neut 92%  Bands 7 (3/19) -> 3% (3/23)  ANC 2.31 BMP    Recent Labs     03/22/24  0524 03/23/24  0519   SODIUM 145 150*   K 4.0 4.8   * 116*   CO2 20* 21   AGAP 12 13   BUN 77* 75*   CREATININE 1.33* 1.12   CALCIUM 8.8 8.8    FWD 2.8L   Coags    Recent Labs     03/21/24  1332   INR 1.49*   PTT 40*        Additional Electrolytes  Recent Labs     03/22/24  0524 03/23/24  0519   MG 2.3 2.5   PHOS 5.9* 4.8*          Blood Gas    Recent Labs     03/21/24  1512   PHART 7.374   IGG6MNR 32.6*   PO2ART 81.7   FOX0VVG 18.6*   BEART -5.8   SOURCE Line, Arterial     Recent Labs     03/21/24  1512   SOURCE Line, Arterial    LFTs  Recent Labs     03/23/24  0519   ALT 40   AST 21   ALKPHOS 98   ALB 2.3*   TBILI 0.45       Infectious  No recent results  Glucose  Recent Labs     03/21/24  1332 03/22/24  0524 03/23/24  0519   GLUC 247* 176* 194*               Emilie Soyeon Kim, MD

## 2024-03-24 LAB
ALBUMIN SERPL BCP-MCNC: 2.5 G/DL (ref 3.5–5)
ALP SERPL-CCNC: 115 U/L (ref 34–104)
ALT SERPL W P-5'-P-CCNC: 41 U/L (ref 7–52)
ANION GAP SERPL CALCULATED.3IONS-SCNC: 8 MMOL/L (ref 4–13)
AST SERPL W P-5'-P-CCNC: 13 U/L (ref 13–39)
BILIRUB SERPL-MCNC: 0.43 MG/DL (ref 0.2–1)
BUN SERPL-MCNC: 68 MG/DL (ref 5–25)
CALCIUM ALBUM COR SERPL-MCNC: 10.4 MG/DL (ref 8.3–10.1)
CALCIUM SERPL-MCNC: 9.2 MG/DL (ref 8.4–10.2)
CHLORIDE SERPL-SCNC: 119 MMOL/L (ref 96–108)
CO2 SERPL-SCNC: 23 MMOL/L (ref 21–32)
CREAT SERPL-MCNC: 0.99 MG/DL (ref 0.6–1.3)
ERYTHROCYTE [DISTWIDTH] IN BLOOD BY AUTOMATED COUNT: 18.9 % (ref 11.6–15.1)
GFR SERPL CREATININE-BSD FRML MDRD: 63 ML/MIN/1.73SQ M
GLUCOSE SERPL-MCNC: 106 MG/DL (ref 65–140)
GLUCOSE SERPL-MCNC: 121 MG/DL (ref 65–140)
GLUCOSE SERPL-MCNC: 132 MG/DL (ref 65–140)
GLUCOSE SERPL-MCNC: 134 MG/DL (ref 65–140)
GLUCOSE SERPL-MCNC: 139 MG/DL (ref 65–140)
GLUCOSE SERPL-MCNC: 151 MG/DL (ref 65–140)
GLUCOSE SERPL-MCNC: 154 MG/DL (ref 65–140)
GLUCOSE SERPL-MCNC: 162 MG/DL (ref 65–140)
GLUCOSE SERPL-MCNC: 164 MG/DL (ref 65–140)
GLUCOSE SERPL-MCNC: 173 MG/DL (ref 65–140)
GLUCOSE SERPL-MCNC: 194 MG/DL (ref 65–140)
GLUCOSE SERPL-MCNC: 221 MG/DL (ref 65–140)
GLUCOSE SERPL-MCNC: 273 MG/DL (ref 65–140)
GLUCOSE SERPL-MCNC: 63 MG/DL (ref 65–140)
HCT VFR BLD AUTO: 33 % (ref 34.8–46.1)
HGB BLD-MCNC: 10.4 G/DL (ref 11.5–15.4)
MAGNESIUM SERPL-MCNC: 2.3 MG/DL (ref 1.9–2.7)
MCH RBC QN AUTO: 30.1 PG (ref 26.8–34.3)
MCHC RBC AUTO-ENTMCNC: 31.5 G/DL (ref 31.4–37.4)
MCV RBC AUTO: 96 FL (ref 82–98)
NRBC BLD AUTO-RTO: 0 /100 WBCS
PHOSPHATE SERPL-MCNC: 3.8 MG/DL (ref 2.7–4.5)
PLATELET # BLD AUTO: 54 THOUSANDS/UL (ref 149–390)
PMV BLD AUTO: 12 FL (ref 8.9–12.7)
POTASSIUM SERPL-SCNC: 4.4 MMOL/L (ref 3.5–5.3)
PROT SERPL-MCNC: 5 G/DL (ref 6.4–8.4)
RBC # BLD AUTO: 3.45 MILLION/UL (ref 3.81–5.12)
SODIUM SERPL-SCNC: 150 MMOL/L (ref 135–147)
WBC # BLD AUTO: 1.78 THOUSAND/UL (ref 4.31–10.16)

## 2024-03-24 PROCEDURE — 83735 ASSAY OF MAGNESIUM: CPT | Performed by: STUDENT IN AN ORGANIZED HEALTH CARE EDUCATION/TRAINING PROGRAM

## 2024-03-24 PROCEDURE — L8501 TRACHEOSTOMY SPEAKING VALVE: HCPCS

## 2024-03-24 PROCEDURE — 99232 SBSQ HOSP IP/OBS MODERATE 35: CPT | Performed by: INTERNAL MEDICINE

## 2024-03-24 PROCEDURE — 85027 COMPLETE CBC AUTOMATED: CPT

## 2024-03-24 PROCEDURE — 82948 REAGENT STRIP/BLOOD GLUCOSE: CPT

## 2024-03-24 PROCEDURE — 92526 ORAL FUNCTION THERAPY: CPT

## 2024-03-24 PROCEDURE — 80053 COMPREHEN METABOLIC PANEL: CPT

## 2024-03-24 PROCEDURE — 94760 N-INVAS EAR/PLS OXIMETRY 1: CPT

## 2024-03-24 PROCEDURE — 94664 DEMO&/EVAL PT USE INHALER: CPT

## 2024-03-24 PROCEDURE — 87385 HISTOPLASMA CAPSUL AG IA: CPT | Performed by: STUDENT IN AN ORGANIZED HEALTH CARE EDUCATION/TRAINING PROGRAM

## 2024-03-24 PROCEDURE — C9113 INJ PANTOPRAZOLE SODIUM, VIA: HCPCS | Performed by: INTERNAL MEDICINE

## 2024-03-24 PROCEDURE — 94762 N-INVAS EAR/PLS OXIMTRY CONT: CPT

## 2024-03-24 PROCEDURE — 99233 SBSQ HOSP IP/OBS HIGH 50: CPT | Performed by: INTERNAL MEDICINE

## 2024-03-24 PROCEDURE — 92507 TX SP LANG VOICE COMM INDIV: CPT

## 2024-03-24 PROCEDURE — 93971 EXTREMITY STUDY: CPT | Performed by: SURGERY

## 2024-03-24 PROCEDURE — 84100 ASSAY OF PHOSPHORUS: CPT

## 2024-03-24 PROCEDURE — 94640 AIRWAY INHALATION TREATMENT: CPT

## 2024-03-24 RX ORDER — DEXTROSE MONOHYDRATE 25 G/50ML
INJECTION, SOLUTION INTRAVENOUS
Status: COMPLETED
Start: 2024-03-24 | End: 2024-03-24

## 2024-03-24 RX ORDER — DEXTROSE MONOHYDRATE 25 G/50ML
25 INJECTION, SOLUTION INTRAVENOUS ONCE
Status: COMPLETED | OUTPATIENT
Start: 2024-03-24 | End: 2024-03-24

## 2024-03-24 RX ADMIN — ENOXAPARIN SODIUM 40 MG: 40 INJECTION SUBCUTANEOUS at 08:46

## 2024-03-24 RX ADMIN — MINOCYCLINE HYDROCHLORIDE 200 MG: 50 TABLET, FILM COATED ORAL at 18:32

## 2024-03-24 RX ADMIN — LAMOTRIGINE 150 MG: 100 TABLET ORAL at 17:47

## 2024-03-24 RX ADMIN — NYSTATIN: 100000 POWDER TOPICAL at 08:47

## 2024-03-24 RX ADMIN — PANTOPRAZOLE SODIUM 40 MG: 40 INJECTION, POWDER, FOR SOLUTION INTRAVENOUS at 08:45

## 2024-03-24 RX ADMIN — IPRATROPIUM BROMIDE 0.5 MG: 0.5 SOLUTION RESPIRATORY (INHALATION) at 19:59

## 2024-03-24 RX ADMIN — VENLAFAXINE 12.5 MG: 25 TABLET ORAL at 07:22

## 2024-03-24 RX ADMIN — LEVALBUTEROL HYDROCHLORIDE 1.25 MG: 1.25 SOLUTION RESPIRATORY (INHALATION) at 14:32

## 2024-03-24 RX ADMIN — LAMOTRIGINE 150 MG: 100 TABLET ORAL at 08:46

## 2024-03-24 RX ADMIN — TOPIRAMATE 50 MG: 100 TABLET, FILM COATED ORAL at 08:50

## 2024-03-24 RX ADMIN — VENLAFAXINE 12.5 MG: 25 TABLET ORAL at 12:16

## 2024-03-24 RX ADMIN — IPRATROPIUM BROMIDE 0.5 MG: 0.5 SOLUTION RESPIRATORY (INHALATION) at 14:32

## 2024-03-24 RX ADMIN — IPRATROPIUM BROMIDE 0.5 MG: 0.5 SOLUTION RESPIRATORY (INHALATION) at 08:07

## 2024-03-24 RX ADMIN — DEXTROSE MONOHYDRATE 25 ML: 25 INJECTION, SOLUTION INTRAVENOUS at 22:27

## 2024-03-24 RX ADMIN — SODIUM CHLORIDE SOLN NEBU 3% 4 ML: 3 NEBU SOLN at 08:07

## 2024-03-24 RX ADMIN — Medication 2 SPRAY: at 14:20

## 2024-03-24 RX ADMIN — MINOCYCLINE HYDROCHLORIDE 200 MG: 50 TABLET, FILM COATED ORAL at 08:46

## 2024-03-24 RX ADMIN — SODIUM CHLORIDE SOLN NEBU 3% 4 ML: 3 NEBU SOLN at 14:32

## 2024-03-24 RX ADMIN — POLYETHYLENE GLYCOL 3350 17 G: 17 POWDER, FOR SOLUTION ORAL at 08:46

## 2024-03-24 RX ADMIN — Medication 2 SPRAY: at 22:04

## 2024-03-24 RX ADMIN — TOPIRAMATE 50 MG: 100 TABLET, FILM COATED ORAL at 17:48

## 2024-03-24 RX ADMIN — SODIUM CHLORIDE SOLN NEBU 3% 4 ML: 3 NEBU SOLN at 19:59

## 2024-03-24 RX ADMIN — LEVALBUTEROL HYDROCHLORIDE 1.25 MG: 1.25 SOLUTION RESPIRATORY (INHALATION) at 08:09

## 2024-03-24 RX ADMIN — Medication 2 SPRAY: at 07:20

## 2024-03-24 RX ADMIN — CHLORHEXIDINE GLUCONATE 0.12% ORAL RINSE 15 ML: 1.2 LIQUID ORAL at 22:03

## 2024-03-24 RX ADMIN — METHYLPREDNISOLONE SODIUM SUCCINATE 30 MG: 40 INJECTION, POWDER, FOR SOLUTION INTRAMUSCULAR; INTRAVENOUS at 22:03

## 2024-03-24 RX ADMIN — CHLORHEXIDINE GLUCONATE 0.12% ORAL RINSE 15 ML: 1.2 LIQUID ORAL at 08:46

## 2024-03-24 RX ADMIN — SODIUM CHLORIDE 11 UNITS/HR: 9 INJECTION, SOLUTION INTRAVENOUS at 17:37

## 2024-03-24 RX ADMIN — MODAFINIL 100 MG: 100 TABLET ORAL at 08:46

## 2024-03-24 RX ADMIN — Medication 2 SPRAY: at 10:21

## 2024-03-24 RX ADMIN — LEVALBUTEROL HYDROCHLORIDE 1.25 MG: 1.25 SOLUTION RESPIRATORY (INHALATION) at 19:59

## 2024-03-24 RX ADMIN — NYSTATIN: 100000 POWDER TOPICAL at 17:48

## 2024-03-24 RX ADMIN — VENLAFAXINE 12.5 MG: 25 TABLET ORAL at 17:48

## 2024-03-24 RX ADMIN — METHYLPREDNISOLONE SODIUM SUCCINATE 30 MG: 40 INJECTION, POWDER, FOR SOLUTION INTRAMUSCULAR; INTRAVENOUS at 08:45

## 2024-03-24 RX ADMIN — PANTOPRAZOLE SODIUM 40 MG: 40 INJECTION, POWDER, FOR SOLUTION INTRAVENOUS at 22:04

## 2024-03-24 RX ADMIN — Medication 2 SPRAY: at 17:49

## 2024-03-24 RX ADMIN — Medication 2 SPRAY: at 02:59

## 2024-03-24 NOTE — SPEECH THERAPY NOTE
Speech-Language Pathology Progress Note    Patient Name: Carla Hunt    Today's Date: 3/24/2024    Subjective:  Pt was awake. She was sitting upright in bed on trach collar. Family present.    Objective:  Pt seen for PMV trial as she had improved tolerance of finger occlusion trials this morning. PMV was provided, family educated on function of speaking valve. Initial application yielded no coughing, comfortable appearing breathing, and VSS. No back pressure with PMV removal. Valve was replaced and kept on for 10 min initially, then left on after session was complete with SLP and RN monitoring intermittently. Slight increase in resp rate when pt attempted to speak, otherwise VSS and no apparent discomfort.     Assessment:  Voice remains aphonic/strained however discussed with family PMV is also useful in helping to restore a more normal breathing pattern. Okay to leave on with close RN supervision, and family was also shown how to remove PMV if pt has any signs of discomfort.     Plan:  PMV with supervision. ST f/u for continued use and education with valve, as well as f/u swallow therapy.

## 2024-03-24 NOTE — SPEECH THERAPY NOTE
"Speech/Language Pathology Progress Note  Speech [id number]  Swallowing 1122-0900    Patient Name: Carla Hunt  Today's Date: 3/24/2024     Subjective:  Pt more alert today and responds to verbal cueing. She was sitting upright in bed on trach collar.     Objective:  Speech: Inner cannula removed, replaced with new IC at end of session. Cuff remains deflated. Finger occlusion trials completed several seconds initially, progressing in duration. Pt was cued to cough, and oral suctioning over the base of the tongue was partially successful in clearing upper airway breath sounds. Pt was able to respond to some simple questions with single words, voice was aphonic and significantly strained/harsh. Perseverations noted in responses (able to state she was in the hospital, when asked 's name she still responded \"hospital\"). Airflow does appear to be improved despite size 8.0 trach. With repeated attempts at cueing for speech and coughing she did desat to 89%, trach collar was replaced and SpO2 returned to 95%.    Swallowing:  Oral care was completed with oral care kit/suction toothbrush. Pt was offered HTL cranberry juice via 1/2 tsp x2. Bolus retrieval was adequate despite weak labial seal. AP transfer appeared functional. Suspect delayed swallow initiation and reduced hyolaryngeal rise. Based on aphonic vocal quality suspect incomplete airway closure. Multiple swallows noted with each trial. Once swallow attempts were complete pt had immediate wet coughing. Trach suction was completed. Coughing resolved and trials were discontinued.    Assessment:  Improved alertness and responsiveness today. Appearing improved airflow around trach/through glottis. Pt remains high risk for aspiration. Discussed with  at bedside, there is concern for PEG placement d/t abdominal wounds/poor wound healing, ostomy s/p hemicolectomy.     Plan/Recommendations:  Will continue to work with pt at bedside until covid precautions " are lifted, then plan for MBS. NGT for nutrition now. With improved tolerance of finger occlusion trials may be able to attempt PMV later today.

## 2024-03-24 NOTE — PROGRESS NOTES
NEPHROLOGY PROGRESS NOTE   Carla Hunt 59 y.o. female MRN: [id number]  [facility]: [id number]  Reason for Consult: Acute kidney injury    ASSESSMENT/PLAN:  Acute kidney injury, etiology multifactorial, likely component due to ATN given hemodynamic fluctuations, worsening anemia and previous episodic ELIESER.  Additional component was likely secondary to Bactrim use with impaired creatinine secretion.  Current creatinine 0.99.  Remains nonoliguric.  Hypernatremia, adjust free water flushes to every 4 hours.  Metabolic acidosis, stable, continue to monitor  Acute on chronic anemia status post PRBC transfusion previous serum and urine electrophoresis negative for monoclonal gammopathy  Stomal site bleeding with bedside procedure with control of bleeding/and adequate hemostasis  Recent volvulus status post hemicolectomy with cecal ostomy  B-cell lymphoma, previously on rituximab therapy  Encephalopathy with recent subarachnoid hemorrhage and history of seizure disorder.  Hypoxic respiratory failure, remains on trach collar.    Renal function overall appears quite stable with creatinine 0.99  Hyponatremia stable at 150.  Recommend increasing free water flushes to every 4 hours.  Discussed with critical care.    SUBJECTIVE:  Seen and examined.  Patient awakens to stimuli.  Answers simple questions.  Does not appear in any acute distress.    Review of Systems    OBJECTIVE:  Current Weight: Weight - Scale: 78.1 kg (172 lb 2.9 oz)  Vitals:    03/24/24 0300 03/24/24 0400 03/24/24 0500 03/24/24 0600   BP: 130/67 137/67     Pulse: (!) 135 (!) 133 (!) 128 (!) 130   Resp: 18 17 16 17   Temp:  97.6 °F (36.4 °C)     TempSrc:  Oral     SpO2: 97% 97% 98% 98%   Weight:       Height:           Intake/Output Summary (Last 24 hours) at 3/24/2024 0842  Last data filed at 3/24/2024 0601  Gross per 24 hour   Intake 3271.16 ml   Output 3375 ml   Net -103.84 ml       Physical Exam  Eyes:      General: No scleral  icterus.  Cardiovascular:      Rate and Rhythm: Regular rhythm. Tachycardia present.   Pulmonary:      Effort: Pulmonary effort is normal.      Breath sounds: Rhonchi present.   Abdominal:      General: There is no distension.      Palpations: Abdomen is soft.   Musculoskeletal:      Right lower leg: No edema.      Left lower leg: No edema.   Skin:     General: Skin is warm and dry.      Findings: No rash.   Neurological:      Mental Status: She is alert. Mental status is at baseline.         Medications:    Current Facility-Administered Medications:     acetaminophen (TYLENOL) tablet 650 mg, 650 mg, Oral, Q6H PRN, Moises Bowden MD    chlorhexidine (PERIDEX) 0.12 % oral rinse 15 mL, 15 mL, Mouth/Throat, Q12H SARTHAK, Moises Bowedn MD, 15 mL at 03/23/24 2201    enoxaparin (LOVENOX) subcutaneous injection 40 mg, 40 mg, Subcutaneous, Q24H SARTHAKJoe DO, 40 mg at 03/23/24 0835    fentaNYL injection 50 mcg, 50 mcg, Intravenous, Q1H PRN, Moises Bowden MD, 50 mcg at 03/21/24 1344    insulin regular (HumuLIN R,NovoLIN R) 1 Units/mL in sodium chloride 0.9 % 100 mL infusion, 0.3-21 Units/hr, Intravenous, Titrated, Joe Lazcano DO, Last Rate: 2 mL/hr at 03/24/24 0800, 2 Units/hr at 03/24/24 0800    ipratropium (ATROVENT) 0.02 % inhalation solution 0.5 mg, 0.5 mg, Nebulization, TID, Moises Bowden MD, 0.5 mg at 03/24/24 0807    lamoTRIgine (LaMICtal) tablet 150 mg, 150 mg, Oral, BID, Moises Bowden MD, 150 mg at 03/23/24 1750    levalbuterol (XOPENEX) inhalation solution 1.25 mg, 1.25 mg, Nebulization, TID, Moises Bowden MD, 1.25 mg at 03/24/24 0809    [COMPLETED] methylPREDNISolone sodium succinate (Solu-MEDROL) injection 50 mg, 50 mg, Intravenous, Q6H SARTHAK, 50 mg at 03/16/24 1830 **FOLLOWED BY** [COMPLETED] methylPREDNISolone sodium succinate (Solu-MEDROL) injection 40 mg, 40 mg, Intravenous, Q6H SARTHAK, 40 mg at 03/19/24 1756 **FOLLOWED BY** [COMPLETED] methylPREDNISolone sodium succinate (Solu-MEDROL) injection 30 mg, 30 mg, Intravenous, Q8H SARTHAK,  30 mg at 03/21/24 2246 **FOLLOWED BY** [DISCONTINUED] methylPREDNISolone sodium succinate (Solu-MEDROL) injection 20 mg, 20 mg, Intravenous, Q6H SARTHAK **FOLLOWED BY** [DISCONTINUED] methylPREDNISolone sodium succinate (Solu-MEDROL) injection 10 mg, 10 mg, Intravenous, Q6H SARTHAK **FOLLOWED BY** [DISCONTINUED] methylPREDNISolone sodium succinate (Solu-MEDROL) injection 10 mg, 10 mg, Intravenous, Q8H SARTHAK **FOLLOWED BY** [DISCONTINUED] methylPREDNISolone sodium succinate (Solu-MEDROL) injection 10 mg, 10 mg, Intravenous, Q12H SARTHAK **FOLLOWED BY** [DISCONTINUED] methylPREDNISolone sodium succinate (Solu-MEDROL) injection 10 mg, 10 mg, Intravenous, Daily **FOLLOWED BY** methylPREDNISolone sodium succinate (Solu-MEDROL) injection 30 mg, 30 mg, Intravenous, Q12H SARTHAK, 30 mg at 03/23/24 2200 **FOLLOWED BY** [START ON 3/25/2024] methylPREDNISolone sodium succinate (Solu-MEDROL) injection 20 mg, 20 mg, Intravenous, Q12H SARTHAK **FOLLOWED BY** [START ON 3/28/2024] methylPREDNISolone sodium succinate (Solu-MEDROL) injection 10 mg, 10 mg, Intravenous, Q12H SARTHAK **FOLLOWED BY** [START ON 3/31/2024] methylPREDNISolone sodium succinate (Solu-MEDROL) injection 10 mg, 10 mg, Intravenous, Daily, Emilie Soyeon Kim, MD    minocycline (DYNACIN) tablet 200 mg, 200 mg, Per NG Tube, BID, Betzaida Sr MD, 200 mg at 03/23/24 1747    modafinil (PROVIGIL) tablet 100 mg, 100 mg, Per NG Tube, Daily, Moises Bowden MD, 100 mg at 03/23/24 0835    nystatin (MYCOSTATIN) powder, , Topical, BID, Moises Bowden MD, Given at 03/23/24 1746    ondansetron (ZOFRAN) injection 4 mg, 4 mg, Intravenous, Q6H PRN, Moises Bowden MD    pantoprazole (PROTONIX) injection 40 mg, 40 mg, Intravenous, Q12H SARTHAK, Moises Bowden MD, 40 mg at 03/23/24 2200    polyethylene glycol (MIRALAX) packet 17 g, 17 g, Per NG Tube, Daily, Moises Bowden MD, 17 g at 03/23/24 0835    sodium chloride (AYR SALINE NASAL) nasal gel 1 Application, 1 Application, Nasal, Q1H PRN, Moises Bowden MD    sodium chloride (OCEAN)  0.65 % nasal spray 2 spray, 2 spray, Each Nare, Q4H, Moises Bowden MD, 2 spray at 03/24/24 0720    sodium chloride 3 % inhalation solution 4 mL, 4 mL, Nebulization, TID, Moises Bowden MD, 4 mL at 03/24/24 0807    topiramate (TOPAMAX) 6 mg/ml oral suspension 50 mg, 50 mg, Per PEG Tube, BID, Moises Bowden MD, 50 mg at 03/23/24 1827    venlafaxine (EFFEXOR) tablet 12.5 mg, 12.5 mg, Oral, TID With Meals, Moises Bowden MD, 12.5 mg at 03/24/24 0722    Laboratory Results:  Results from last 7 days   Lab Units 03/24/24  0437 03/23/24  0519 03/22/24  0524 03/21/24  1332 03/21/24  0850 03/21/24  0557 03/21/24  0552 03/21/24  0034 03/20/24  1606 03/20/24  1333 03/20/24  1048 03/20/24  0831 03/20/24  0827 03/20/24  0502 03/20/24  0457 03/19/24  0543 03/18/24  0533 03/18/24  0429   WBC Thousand/uL 1.78* 2.43* 3.89* 5.30  --   --  4.87  --   --   --  7.09  --   --   --  7.88 9.92  --  8.39  8.39   HEMOGLOBIN g/dL 10.4* 9.7* 9.8* 9.4* 10.0*  --  9.6* 10.0*   < >  --  11.6  --    < >  --  9.0* 9.5*   < > 5.6*  5.6*   I STAT HEMOGLOBIN g/dl  --   --   --   --   --   --   --   --   --   --   --  6.8*  --   --   --   --   --   --    HEMATOCRIT % 33.0* 29.9* 28.6* 27.9* 29.9*  --  28.5* 30.0*   < >  --  34.5*  --    < >  --  27.6* 28.1*   < > 17.6*  17.6*   HEMATOCRIT, ISTAT %  --   --   --   --   --   --   --   --   --   --   --  20*  --   --   --   --   --   --    PLATELETS Thousands/uL 54* 68* 77* 82*  --   --  53*  --   --   --  72*  --   --   --  85* 86*  --  89*  89*   POTASSIUM mmol/L 4.4 4.8 4.0 4.4  --   --  4.3  --   --  4.8  --   --   --  4.9  --  4.7  --  4.1   CHLORIDE mmol/L 119* 116* 113* 111*  --   --  110*  --   --  112*  --   --   --  108  --  107  --  107   CO2 mmol/L 23 21 20* 19*  --   --  21  --   --  15*  --   --   --  18*  --  17*  --  18*   CO2, I-STAT mmol/L  --   --   --   --   --   --   --   --   --   --   --  15*  --   --   --   --   --   --    BUN mg/dL 68* 75* 77* 77*  --   --  79*  --   --  87*  --   --   --   88*  --  85*  --  81*   CREATININE mg/dL 0.99 1.12 1.33* 1.38*  --   --  1.41*  --   --  1.54*  --   --   --  1.63*  --  1.79*  --  1.89*   CALCIUM mg/dL 9.2 8.8 8.8 8.3*  --   --  8.3*  --   --  8.7  --   --   --  8.7  --  8.4  --  8.0*   MAGNESIUM mg/dL 2.3 2.5 2.3  --   --  2.2  --   --   --   --   --   --   --   --  2.4 2.3  --  2.3   PHOSPHORUS mg/dL 3.8 4.8* 5.9*  --   --  5.4*  --   --   --   --   --   --   --   --  5.2* 4.0  --  4.0   GLUCOSE, ISTAT mg/dl  --   --   --   --   --   --   --   --   --   --   --  118  --   --   --   --   --   --     < > = values in this interval not displayed.

## 2024-03-24 NOTE — PROGRESS NOTES
[facility]  Progress Note: Critical Care  Name: Carla Hunt 59 y.o. female I MRN: [id number]  Unit/Bed#: MICU 12 I Date of Admission: 1/10/2024   Date of Service: 3/24/2024 I Hospital Day: 74    Assessment/Plan   Acute hypoxic respiratory failure;; s/p tracheostomy 3/12; intermittently requiring vent  Bilateral ground glass opacities, persistent, improving  Persistent COVID-19 infection; s/p 14d course of antivirals completed 3/15, superimpoved by #4  Stenotrophomonas pneumonia; recurrent, previously on Bactrim, switched to minocycline 2/2 ELIESER, no resolved, will continue ABX through 3/26  Acute on chronic anemia- multifactorial-blood loss from ostomy site now resolved s/p suturing of bleeding vessel, chronic inflammation, iatrogenic, marrow dysfunction in setting of persistent inflammation likely also contributing  Severe Metabolic encephalopathy, multifactorial including #8, improving  Uremia  Hypernatremia  Thrombocytopenia; s/p 1U plt, improving  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; complicated by #11  Wound dehiscence 2/2 poor wound healing s/p wound vac 3/13  B-Cell lymphoma, s/p chemotheraphy 2022, Rituxan maintenance therapy on hold  Minimal SAH POA, no interventions required, resolved on repeat CTHS  Seizure disorder; on home AEDs  Steroid induced hyperglycemia; controlled on insulin gtt  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  -Most recent repeat CTH 3/13 negative for acute intracranial findings.  Has had  extensive neurological workup including MRI/CT neuroimaging, LP with unremarkable findings  -Etiology unclear. Has been off sedation. Electrolytes, sodium, BS at goal. Ammonia normal. May be prolonged 2/2 PNA, possibly worsened by uremic encephalopathy worsened by ELIESER, ?UGIB, however GFR not <15; candida growth seen on BAL Cx from 3/11, may be respiraotry colonization, however fungal infection not ruled in setting of severe lymphopenia, depressed immunoglobulins.      Plan:  -Avoid PRN fentanyl/sedative meds as able.  -Continue modafinil  100mg qAM to stimulate arousal per palliative    -Continue to wean steroids: currently 30 BID day 3/3.   -Thereafter: Wean to 20 mg BID x 3 days, 10 mg BID x 3 days, 10 mg daily x 3 days.  -ABX tx with PNA as below  -Frequent neurochecks   -Can consider MRI brain to evaluate for eval for encephalitis/inflammatory process, however may not   -Video EEG deferred at thist time due to low suspicion for seizures. Less likely non-convulsive status epilepticus as patient is on home AEDs.     Diagnosis: seizure disorder, known history  -S/p partial left temporal lobe resection in 2007 2/2 ?complex migraines  -On Lamictal 150 bid and Topamax 50mg bid  -Last lamotrigine level from 03/02 at 10.7 (therapeutic)     Diagnosis: Anxiety, known history  -On Effexor 12.5 mg TID     CV:  -Diagnosis: Sinus tachycardia  -In setting of blood loss anemia/hypovolemia, improving with PRBC transfusions and IVF  -TTE 3/17 with no major structural dysfunction  -May be 2/2 dehydration/hypvol, acute on chronic anemia, underlying infectious process, pain, less likely Modafinal-induced as patient was persistently tachy prior   Plan:  - See plans under Pulm, Heme/Onc, ID  -Hold further boluses  -Free water flushes        Pulm:  Diagnosis: Acute hypoxic respiratory failure  Diagnosis: Bilateral ground glass opacities, persistent  -Vent dependent; s/p trach 3/12 after was reintubated 3/11 due to  increased WOB,elevated CRP  -Persistently COVID+ s/p multiple courses of antivirals (most recent completed 3/15). Complicated by recurrent bacterial PNA treated w 10d levaquin (completed 2/25) for MDR PNA; most recent BAL +recurrent stenotrophomona treated with Bactrim, swithced to minocyline today 2/2 ELIESER  BAL also with 4+ candida albican; serum fungitell elevated  -Etiology Likely multifactorial including possible COVID pneumonitis vs recurrent PNA vs hypersensitivity pneumonitis 2/2 ?rituxumab. Persistent inflammation likely given patient has been steroid responsive throughout admission.   -CRP increasing but likely from intraabdominal inflammation as patient is noted to have clinical improvement with weaning of steroids, ABx. Fungal source not ruled out in setting of severe lymphopenia, low immunoglobulins.  -Repeat CXR 2/23 with much improvement of multifocal bronchopneumonia     Plan:  -Finished 14d course of Paxlovid/remdesivir on 3/15  -Continue minocycline 200mg bid via NGT to tx Stenotrophomonas PNA as below  -Steroid wean- IV Solu-Medrol 30 mg bid today, 20mg  bid tomorrow  -Will hold off on starting empiric antifungal given improvement in neurological exam however may be consideration if clinical status does not improve.  -Follow up BAL viral negative  -Follow up anti-Rituximab ab  -Continue trach collar, wean O2        GI:  Diagnosis: Partial cecal volvulus s/p right hemicolectomy with ostomy pouch in place  -Complicated by poor wound healing of midline incision as evidenced by wound dehiscence s/p wound vac that was removed 3/17 by gen surg.  -Stoma with dark brown output, consistent with prior  Plan:  -Wound vac as per general surgery  -Monitor ostomy pouch output  -Surgery following, will keep them updated if any further changes     :  Diagnosis: ELIESER on CKD III, resolved  -Baseline Cr  0.8-1.2  -Cr remains elevated but improving today, Cr 1.43, BUN remains elevated but improving  -UO adequate, on  Ortiz, draining clear yellow urine  -Prerenal azotemia 2/2 hypovolemia, anemia-?blood loss, likely exacerbated by bactrim use (increased Cr secretion), however remains off Bactrim x2d with improvement in serum creatinine  Plan:  -Improving, s/p IVF, s/p prbc transfusions for blood loss anemia on 3/19, 3/21; Hgb stable  -Bactrim switched to minocycline, will continue  -Trend daily BMP  -Continue to monitor I/O and UOP  -Avoid nephrotoxins, contrast dye as able     Diagnosis: NAGMA,   -Cl normal, positive urine gap. No loose stools/diarrhea reported  -Ddx include 2/2 uremic acidosis and ELIESER vs RTA2 from Topamax given positive UG. Unlikely RTA1 given absence of hypokalemia and normal serum Cl  Plan:  -s/p sodom bicarb drip overnight, NAGMA currently resolved  -Bactrim switched to minocycline, day 2, with improvement in serum Cr  -Treat underlying uremia, planned EGD next week as per GI to rule out UGIB  -Optimize electrolytes including potassium/monitor hypokalemia  -?  Consider discontinuation of topiramate  -Trend bmp daily     F/E/N:  F: none  E: monitor and replete for goal K>4, P>3, Mg>2; hypernatremic; see below  N: tube feeds with vital 1.5; 45 cc/h, Prosource liquid protein to 1 packet BID  Adjust free water flushes to 350cc q4h; FWD 1.8L  Aspiration precautions, Continue frequent oral care, MBS when appropriate per speech  ENT for assessment of VF closure/airway protection in the future as indicated     Heme/Onc:  Diagnosis: Acute on chronic anemia  -Baseline Hgb ~7-8. S/P 2U PRBCs 3/17 after repeat Hgb 5.3, total of 6U transfused since admission.  -Patient had significant blood loss from ostomy site 3/20, resulting in hemorraghic shock, improved with blood transfusion; hemostasis achieved after bleeding source identified and sutured. Another large vol danie bloody output from osotomy on 3/21, bleeding source within ostomy site, sutured. S/P 1U prbc.  Repeat H&H stable.   -?Worsened by impaired marrow response  rociokly 2/2 persistent inflammation due to low retic index ~1 prior to most recent transfusions; normocytic with normal B12/folate levels; MCV<100. Iatrogenic cause likely contributing 2/2 frequent blood draws; chronic anemia likely persistent inflammatory state/CKD/lymphoma in setting of elevated ferritin of 974. SPEP/UPEP negative. Hemolysis labs negative.   Plan:  -Routine monitoring ostomy output, if bleeding, apply direct pressure and contact gen surg stat.  -Continue tube feeds/nutritional support  ·Trend CBC, transfuse to maintain Hgb>7     Diagnosis: Thrombocytopenia  -Persistent thrombocytopenia likely in setting of infectious state, known history of B-cell lymphoma.  May also have component of marrow dysfunction in setting of persistent inflammatory state. No objective evidence of liver dysfunction. No known history of von Willebrand's disease.  -Is on heparin product however 4T score suggestive of intermiediate probability of HIT at 14%  Plan:  -Treat underlying anemia as per plan noted above  -Lovenox for DVT ppx     Diagnosis Lymphopenia  -In setting of high dose steroids  -Severely depressed immunoglobulins inclding IgG, IgA, Igm  -At risk for further infections  -Contact and airborne precautions  -Follow up CD4 count     Diagnosis: B cell lymphoma  -[facility]  -S/P 6 cycles of chemotherapy in 20222  -Maintained on Rituxan for 2 year course PTA, started May 2022, last dose was 12/2024, treatment currently on hold in setting of persistent COVID-19 infection  Plan:  -Holding rituximab in setting of persistent COVID-19 infection  -Follow-up CD4 count and percentage  -Follow up anti-Rituximab ab to assess for drug induced hypersensitivity syndrome  -Lymphoma/leukemia flow cytometry      DVT ppx: with lovenox     Endo:  Diagnosis: steroid hyperglycemia and diabetes mellitus type 2, A1c at 6.6 from 01/2024  -Goal -180  -BG range last 24hrs 113-174  Plan:  -On insulin gtt      ID:  Diagnosis: Recurrent bacterial pneumonia  - Patient has completed multiple treatment courses of remdesivir throughout hospitalization in addition to 7 days of ceftriaxone.  - BAL cultures in 2/2024 positive for MDR Pseudomonas and stenotrophomonas treated with 10d course of Levaquin  - CT C/A/P 3/10 with persistent groundglass opacities and changes suggestive of sequelae of COVID, prior infections.  Completed 10d course of cefepime for broad spectrum coveragge (completed (3/13)  -Repeat BAL cultures from 3/11 showing 4+ growth Stenotrophomonas maltophilia. Could be colonization given prior BAL growth of same, however due to recent intubation with prolonged corticosteroid theraphy, will begin treating as true pathogen. Patient otherwise remains afebrile, no leukocytosis. CRP with down trending.  Previously on Bactrim from 3/13 to 3/18, discontinued due to worsening ELIESER  Plan:  -Continue minocycline 200mg bid, via NGT; hold TF 1hr pre and post minocycline adminstration.  -Plan to treat for 14d course through 3/26 w/ abx  -IV steroids as above     MSK/Skin:  Diagnosis: Midline incision wound with poor wound healing-  -General surgery performed staple removal of the patient's midline incision on 3/12 with some skin signs appreciated at the inferior aspect of the wound without obvious pus drainage. Overnight 3/13, wound dehiscence progressed requiring general surgery reevaluation. Red/brown aspirate noted, fascia intact. Wet-to-dry dressings in place. General surgery following for next Epson wound care.  -Poor wound healing in setting of high-dose steroid therapy.  No  exam no obvious signs of infection at this time.   -S/P wound vac replacement 3/13 as per gen surgery. No active concerns for cellulitis.  Plan:  -Wound VAC management as per general surgery  -Wound care for peritracheostomy wound  -Abdominal wound care as per general surgery  -Routine monitoring     Diagnosis: Critical illness myopathy  -Likely  exacerbated by high-dose steroid therapy  Plan:  -Continue to wean steroids as above  -Aggressive PT/OT        Disposition: Critical care    ICU Core Measures     Vented Patient  VAP Bundle  VAP bundle ordered     A: Assess, Prevent, and Manage Pain Has pain been assessed? Yes  Need for changes to pain regimen? No   B: Both Spontaneous Awakening Trials (SATs) and Spontaneous Breathing Trials (SBTs) Plan to perform spontaneous awakening trial today? N/A   Plan to perform spontaneous breathing trial today? N/A   Obvious barriers to extubation? NA   C: Choice of Sedation RASS Goal: N/A patient not on sedation  Need for changes to sedation or analgesia regimen? No   D: Delirium CAM-ICU: Negative   E: Early Mobility  Plan for early mobility? Yes   F: Family Engagement Plan for family engagement today? Yes       Antibiotic Review: Patient on appropriate coverage based on culture data.  and Infectious disease consulted    Review of Invasive Devices:    Ortiz Plan: Continue for accurate I/O monitoring for 48 hours        Prophylaxis:  VTE VTE covered by:  enoxaparin, Subcutaneous, 40 mg at 03/23/24 0835       Stress Ulcer  covered bypantoprazole (PROTONIX) injection 40 mg [[id number]]        Significant 24hr Events     24hr events: no acute overnight events reported.     Subjective     Review of Systems   Unable to perform ROS: Acuity of condition   Psychiatric/Behavioral:  Negative for decreased concentration.         Objective                            Vitals I/O      Most Recent Min/Max in 24hrs   Temp 97.6 °F (36.4 °C) Temp  Min: 97.5 °F (36.4 °C)  Max: 98 °F (36.7 °C)   Pulse (!) 130 Pulse  Min: 114  Max: 135   Resp 17 Resp  Min: 13  Max: 20   /67 BP  Min: 109/56  Max: 147/70   O2 Sat 98 % SpO2  Min: 96 %  Max: 99 %     LDA:  Midline (L)  Peripherphal (R)  Ileostomy (RUQ)  NGT   Surgical airway, cuffed   Intake/Output Summary (Last 24 hours) at 3/24/2024 0736  Last data filed at 3/24/2024 0601  Gross per 24 hour    Intake 3576.06 ml   Output 3375 ml   Net 201.06 ml       Diet Enteral/Parenteral; Tube Feeding No Oral Diet; Vital 1.5; Continuous; 45; Prosource Protein Liquid - One Packet; OD; 300; Water; Every 4 hours    Invasive Monitoring           Physical Exam   Physical Exam  Eyes:      Pupils: Pupils are equal, round, and reactive to light.   Skin:     General: Skin is warm.   HENT:      Nose: No epistaxis.      Mouth/Throat:      Mouth: Mucous membranes are moist.   Neck:      Vascular: No JVD.      Trachea: Tracheostomy present.   Cardiovascular:      Rate and Rhythm: Tachycardia present.      Heart sounds: Normal heart sounds.   Musculoskeletal:      Right lower leg: No edema.      Left lower leg: No edema.   Abdominal: General: An ostomy site is present. There is ostomy site.There is no distension.      Palpations: Abdomen is soft.      Tenderness: There is no abdominal tenderness.      Comments: Wound vac in place   Constitutional:       Appearance: She is ill-appearing.   Pulmonary:      Effort: No accessory muscle usage, respiratory distress or accessory muscle usage.      Breath sounds: Normal breath sounds.   Neurological:      General: No focal deficit present.      Comments: Able to communicate by shaking head yes/no  Awake, alert  Follows commands - hand  b/l      Genitourinary/Anorectal:  Ortiz present.          Diagnostic Studies      EKG: no new  Imaging:   CXR 3/24  Mild congestive changes.  I have personally reviewed pertinent reports.       Medications:  Scheduled PRN   chlorhexidine, 15 mL, Q12H SARTHAK  enoxaparin, 40 mg, Q24H SARTHAK  ipratropium, 0.5 mg, TID  lamoTRIgine, 150 mg, BID  levalbuterol, 1.25 mg, TID  methylPREDNISolone sodium succinate, 30 mg, Q12H SARTHAK   Followed by  [START ON 3/25/2024] methylPREDNISolone sodium succinate, 20 mg, Q12H SARTHAK   Followed by  [START ON 3/28/2024] methylPREDNISolone sodium succinate, 10 mg, Q12H SARTHAK   Followed by  [START ON 3/31/2024] methylPREDNISolone sodium  succinate, 10 mg, Daily  minocycline, 200 mg, BID  modafinil, 100 mg, Daily  nystatin, , BID  pantoprazole, 40 mg, Q12H SARTHAK  polyethylene glycol, 17 g, Daily  sodium chloride, 2 spray, Q4H  sodium chloride, 4 mL, TID  topiramate, 50 mg, BID  venlafaxine, 12.5 mg, TID With Meals      acetaminophen, 650 mg, Q6H PRN  fentaNYL, 50 mcg, Q1H PRN  ondansetron, 4 mg, Q6H PRN  sodium chloride, 1 Application, Q1H PRN       Continuous    insulin regular (HumuLIN R,NovoLIN R) 1 Units/mL in sodium chloride 0.9 % 100 mL infusion, 0.3-21 Units/hr, Last Rate: 4 Units/hr (03/24/24 0411)         Labs:    CBC    Recent Labs     03/23/24 0519 03/24/24 0437   WBC 2.43* 1.78*   HGB 9.7* 10.4*   HCT 29.9* 33.0*   PLT 68* 54*   BANDSPCT 3  --      BMP    Recent Labs     03/23/24 0519 03/24/24  0437   SODIUM 150* 150*   K 4.8 4.4   * 119*   CO2 21 23   AGAP 13 8   BUN 75* 68*   CREATININE 1.12 0.99   CALCIUM 8.8 9.2       Coags    No recent results     Additional Electrolytes  Recent Labs     03/23/24 0519 03/24/24  0437   MG 2.5 2.3   PHOS 4.8* 3.8          Blood Gas    No recent results  No recent results LFTs  Recent Labs     03/23/24 0519 03/24/24  0437   ALT 40 41   AST 21 13   ALKPHOS 98 115*   ALB 2.3* 2.5*   TBILI 0.45 0.43       Infectious  No recent results  Glucose  Recent Labs     03/23/24 0519 03/24/24  0437   GLUC 194* 154*               Joe Lazcano DO

## 2024-03-25 LAB
ANION GAP SERPL CALCULATED.3IONS-SCNC: 8 MMOL/L (ref 4–13)
ANION GAP SERPL CALCULATED.3IONS-SCNC: 9 MMOL/L (ref 4–13)
ANISOCYTOSIS BLD QL SMEAR: PRESENT
BASOPHILS # BLD AUTO: 0 X10E3/UL (ref 0–0.2)
BASOPHILS # BLD AUTO: 0.02 THOUSANDS/ÂΜL (ref 0–0.1)
BASOPHILS NFR BLD AUTO: 0 %
BASOPHILS NFR BLD AUTO: 2 % (ref 0–1)
BILIRUB SERPL-MCNC: 0.53 MG/DL (ref 0.2–1)
BUN SERPL-MCNC: 70 MG/DL (ref 5–25)
BUN SERPL-MCNC: 72 MG/DL (ref 5–25)
CALCIUM SERPL-MCNC: 9.2 MG/DL (ref 8.4–10.2)
CALCIUM SERPL-MCNC: 9.4 MG/DL (ref 8.4–10.2)
CD3+CD4+ CELLS # BLD: ABNORMAL /UL
CD3+CD4+ CELLS NFR BLD: ABNORMAL %
CHLORIDE SERPL-SCNC: 123 MMOL/L (ref 96–108)
CHLORIDE SERPL-SCNC: 124 MMOL/L (ref 96–108)
CO2 SERPL-SCNC: 22 MMOL/L (ref 21–32)
CO2 SERPL-SCNC: 23 MMOL/L (ref 21–32)
CREAT SERPL-MCNC: 0.89 MG/DL (ref 0.6–1.3)
CREAT SERPL-MCNC: 0.89 MG/DL (ref 0.6–1.3)
CRP SERPL QL: 256.9 MG/L
EOSINOPHIL # BLD AUTO: 0 THOUSAND/ÂΜL (ref 0–0.61)
EOSINOPHIL # BLD AUTO: 0 X10E3/UL (ref 0–0.4)
EOSINOPHIL NFR BLD AUTO: 0 %
EOSINOPHIL NFR BLD AUTO: 0 % (ref 0–6)
ERYTHROCYTE [DISTWIDTH] IN BLOOD BY AUTOMATED COUNT: 17.8 % (ref 11.7–15.4)
ERYTHROCYTE [DISTWIDTH] IN BLOOD BY AUTOMATED COUNT: 18.2 % (ref 11.6–15.1)
ERYTHROCYTE [DISTWIDTH] IN BLOOD BY AUTOMATED COUNT: 18.3 % (ref 11.6–15.1)
GFR SERPL CREATININE-BSD FRML MDRD: 71 ML/MIN/1.73SQ M
GFR SERPL CREATININE-BSD FRML MDRD: 71 ML/MIN/1.73SQ M
GLUCOSE SERPL-MCNC: 128 MG/DL (ref 65–140)
GLUCOSE SERPL-MCNC: 136 MG/DL (ref 65–140)
GLUCOSE SERPL-MCNC: 142 MG/DL (ref 65–140)
GLUCOSE SERPL-MCNC: 150 MG/DL (ref 65–140)
GLUCOSE SERPL-MCNC: 152 MG/DL (ref 65–140)
GLUCOSE SERPL-MCNC: 153 MG/DL (ref 65–140)
GLUCOSE SERPL-MCNC: 155 MG/DL (ref 65–140)
GLUCOSE SERPL-MCNC: 157 MG/DL (ref 65–140)
GLUCOSE SERPL-MCNC: 167 MG/DL (ref 65–140)
GLUCOSE SERPL-MCNC: 174 MG/DL (ref 65–140)
GLUCOSE SERPL-MCNC: 177 MG/DL (ref 65–140)
GLUCOSE SERPL-MCNC: 187 MG/DL (ref 65–140)
HCT VFR BLD AUTO: 27 % (ref 34–46.6)
HCT VFR BLD AUTO: 27.6 % (ref 34.8–46.1)
HCT VFR BLD AUTO: 27.8 % (ref 34.8–46.1)
HGB BLD-MCNC: 8.7 G/DL (ref 11.5–15.4)
HGB BLD-MCNC: 8.8 G/DL (ref 11.5–15.4)
HGB BLD-MCNC: 8.9 G/DL (ref 11.1–15.9)
IMM GRANULOCYTES # BLD AUTO: 0.16 THOUSAND/UL (ref 0–0.2)
IMM GRANULOCYTES NFR BLD AUTO: 13 % (ref 0–2)
LYMPHOCYTES # BLD AUTO: 0.06 THOUSANDS/ÂΜL (ref 0.6–4.47)
LYMPHOCYTES # BLD AUTO: 0.1 X10E3/UL (ref 0.7–3.1)
LYMPHOCYTES # BLD AUTO: 0.12 THOUSAND/UL (ref 0.6–4.47)
LYMPHOCYTES # BLD AUTO: 9 %
LYMPHOCYTES NFR BLD AUTO: 1 %
LYMPHOCYTES NFR BLD AUTO: 5 % (ref 14–44)
MAGNESIUM SERPL-MCNC: 2.4 MG/DL (ref 1.9–2.7)
MCH RBC QN AUTO: 30.4 PG (ref 26.6–33)
MCH RBC QN AUTO: 30.4 PG (ref 26.8–34.3)
MCH RBC QN AUTO: 30.9 PG (ref 26.8–34.3)
MCHC RBC AUTO-ENTMCNC: 31.5 G/DL (ref 31.4–37.4)
MCHC RBC AUTO-ENTMCNC: 31.7 G/DL (ref 31.4–37.4)
MCHC RBC AUTO-ENTMCNC: 33 G/DL (ref 31.5–35.7)
MCV RBC AUTO: 92 FL (ref 79–97)
MCV RBC AUTO: 97 FL (ref 82–98)
MCV RBC AUTO: 98 FL (ref 82–98)
METAMYELOCYTES # BLD MANUAL: 0.07 THOUSAND/UL (ref 0–0.1)
METAMYELOCYTES NFR BLD MANUAL: 5 % (ref 0–1)
MONOCYTES # BLD AUTO: 0.02 THOUSAND/ÂΜL (ref 0.17–1.22)
MONOCYTES # BLD AUTO: 0.05 THOUSAND/UL (ref 0–1.22)
MONOCYTES # BLD AUTO: 0.2 X10E3/UL (ref 0.1–0.9)
MONOCYTES NFR BLD AUTO: 2 %
MONOCYTES NFR BLD AUTO: 2 % (ref 4–12)
MONOCYTES NFR BLD AUTO: 4 % (ref 4–12)
MORPHOLOGY BLD-IMP: ABNORMAL
NEUTROPHILS # BLD AUTO: 0.94 THOUSANDS/ÂΜL (ref 1.85–7.62)
NEUTROPHILS # BLD AUTO: 9.7 X10E3/UL (ref 1.4–7)
NEUTROPHILS NFR BLD AUTO: 97 %
NEUTS BAND NFR BLD MANUAL: 30 % (ref 0–8)
NEUTS SEG # BLD: 1.07 THOUSAND/UL (ref 1.81–6.82)
NEUTS SEG NFR BLD AUTO: 52 %
NEUTS SEG NFR BLD AUTO: 78 % (ref 43–75)
NRBC BLD AUTO-RTO: 0 /100 WBCS
NRBC BLD AUTO-RTO: 0 /100 WBCS
NRBC BLD AUTO-RTO: 3 % (ref 0–0)
PHOSPHATE SERPL-MCNC: 3.7 MG/DL (ref 2.7–4.5)
PLATELET # BLD AUTO: 38 THOUSANDS/UL (ref 149–390)
PLATELET # BLD AUTO: 40 THOUSANDS/UL (ref 149–390)
PLATELET # BLD AUTO: 89 X10E3/UL (ref 150–450)
PLATELET BLD QL SMEAR: ABNORMAL
PMV BLD AUTO: 12.6 FL (ref 8.9–12.7)
PMV BLD AUTO: 12.9 FL (ref 8.9–12.7)
POIKILOCYTOSIS BLD QL SMEAR: PRESENT
POTASSIUM SERPL-SCNC: 4.1 MMOL/L (ref 3.5–5.3)
POTASSIUM SERPL-SCNC: 4.3 MMOL/L (ref 3.5–5.3)
RBC # BLD AUTO: 2.85 MILLION/UL (ref 3.81–5.12)
RBC # BLD AUTO: 2.86 MILLION/UL (ref 3.81–5.12)
RBC # BLD AUTO: 2.93 X10E6/UL (ref 3.77–5.28)
RETICS # AUTO: NORMAL 10*3/UL (ref 14097–95744)
RETICS # CALC: 1.01 % (ref 0.37–1.87)
SODIUM SERPL-SCNC: 153 MMOL/L (ref 135–147)
SODIUM SERPL-SCNC: 156 MMOL/L (ref 135–147)
TOTAL CELLS COUNTED SPEC: 100
TOXIC GRANULES BLD QL SMEAR: PRESENT
WBC # BLD AUTO: 1.2 THOUSAND/UL (ref 4.31–10.16)
WBC # BLD AUTO: 1.31 THOUSAND/UL (ref 4.31–10.16)
WBC # BLD AUTO: 10 X10E3/UL (ref 3.4–10.8)

## 2024-03-25 PROCEDURE — 99255 IP/OBS CONSLTJ NEW/EST HI 80: CPT | Performed by: INTERNAL MEDICINE

## 2024-03-25 PROCEDURE — 83010 ASSAY OF HAPTOGLOBIN QUANT: CPT

## 2024-03-25 PROCEDURE — 99233 SBSQ HOSP IP/OBS HIGH 50: CPT | Performed by: STUDENT IN AN ORGANIZED HEALTH CARE EDUCATION/TRAINING PROGRAM

## 2024-03-25 PROCEDURE — 84100 ASSAY OF PHOSPHORUS: CPT

## 2024-03-25 PROCEDURE — C9113 INJ PANTOPRAZOLE SODIUM, VIA: HCPCS | Performed by: INTERNAL MEDICINE

## 2024-03-25 PROCEDURE — 82948 REAGENT STRIP/BLOOD GLUCOSE: CPT

## 2024-03-25 PROCEDURE — 94640 AIRWAY INHALATION TREATMENT: CPT

## 2024-03-25 PROCEDURE — NC001 PR NO CHARGE: Performed by: STUDENT IN AN ORGANIZED HEALTH CARE EDUCATION/TRAINING PROGRAM

## 2024-03-25 PROCEDURE — 87811 SARS-COV-2 COVID19 W/OPTIC: CPT | Performed by: STUDENT IN AN ORGANIZED HEALTH CARE EDUCATION/TRAINING PROGRAM

## 2024-03-25 PROCEDURE — 97112 NEUROMUSCULAR REEDUCATION: CPT

## 2024-03-25 PROCEDURE — 83735 ASSAY OF MAGNESIUM: CPT | Performed by: STUDENT IN AN ORGANIZED HEALTH CARE EDUCATION/TRAINING PROGRAM

## 2024-03-25 PROCEDURE — 97605 NEG PRS WND THER DME<=50SQCM: CPT | Performed by: SURGERY

## 2024-03-25 PROCEDURE — 97535 SELF CARE MNGMENT TRAINING: CPT

## 2024-03-25 PROCEDURE — 94760 N-INVAS EAR/PLS OXIMETRY 1: CPT

## 2024-03-25 PROCEDURE — 82247 BILIRUBIN TOTAL: CPT

## 2024-03-25 PROCEDURE — 85045 AUTOMATED RETICULOCYTE COUNT: CPT

## 2024-03-25 PROCEDURE — 99232 SBSQ HOSP IP/OBS MODERATE 35: CPT | Performed by: INTERNAL MEDICINE

## 2024-03-25 PROCEDURE — 86140 C-REACTIVE PROTEIN: CPT

## 2024-03-25 PROCEDURE — 99232 SBSQ HOSP IP/OBS MODERATE 35: CPT | Performed by: PHYSICIAN ASSISTANT

## 2024-03-25 PROCEDURE — 85027 COMPLETE CBC AUTOMATED: CPT

## 2024-03-25 PROCEDURE — 80048 BASIC METABOLIC PNL TOTAL CA: CPT

## 2024-03-25 PROCEDURE — 94664 DEMO&/EVAL PT USE INHALER: CPT

## 2024-03-25 PROCEDURE — 86022 PLATELET ANTIBODIES: CPT

## 2024-03-25 PROCEDURE — 85007 BL SMEAR W/DIFF WBC COUNT: CPT

## 2024-03-25 RX ORDER — DEXTROSE MONOHYDRATE 50 MG/ML
100 INJECTION, SOLUTION INTRAVENOUS CONTINUOUS
Status: DISPENSED | OUTPATIENT
Start: 2024-03-25 | End: 2024-03-25

## 2024-03-25 RX ORDER — METHYLPREDNISOLONE SODIUM SUCCINATE 40 MG/ML
20 INJECTION, POWDER, LYOPHILIZED, FOR SOLUTION INTRAMUSCULAR; INTRAVENOUS 2 TIMES DAILY
Status: DISCONTINUED | OUTPATIENT
Start: 2024-03-26 | End: 2024-03-26

## 2024-03-25 RX ORDER — DEXTROSE MONOHYDRATE 50 MG/ML
100 INJECTION, SOLUTION INTRAVENOUS CONTINUOUS
Status: DISPENSED | OUTPATIENT
Start: 2024-03-25 | End: 2024-03-26

## 2024-03-25 RX ADMIN — LEVALBUTEROL HYDROCHLORIDE 1.25 MG: 1.25 SOLUTION RESPIRATORY (INHALATION) at 14:31

## 2024-03-25 RX ADMIN — MODAFINIL 100 MG: 100 TABLET ORAL at 08:43

## 2024-03-25 RX ADMIN — Medication 2 SPRAY: at 18:21

## 2024-03-25 RX ADMIN — VENLAFAXINE 12.5 MG: 25 TABLET ORAL at 12:17

## 2024-03-25 RX ADMIN — LAMOTRIGINE 150 MG: 100 TABLET ORAL at 17:05

## 2024-03-25 RX ADMIN — CHLORHEXIDINE GLUCONATE 0.12% ORAL RINSE 15 ML: 1.2 LIQUID ORAL at 08:43

## 2024-03-25 RX ADMIN — SODIUM CHLORIDE SOLN NEBU 3% 4 ML: 3 NEBU SOLN at 14:31

## 2024-03-25 RX ADMIN — SODIUM CHLORIDE SOLN NEBU 3% 4 ML: 3 NEBU SOLN at 07:54

## 2024-03-25 RX ADMIN — MINOCYCLINE HYDROCHLORIDE 200 MG: 50 TABLET, FILM COATED ORAL at 17:06

## 2024-03-25 RX ADMIN — Medication 2 SPRAY: at 22:37

## 2024-03-25 RX ADMIN — LEVALBUTEROL HYDROCHLORIDE 1.25 MG: 1.25 SOLUTION RESPIRATORY (INHALATION) at 07:54

## 2024-03-25 RX ADMIN — IPRATROPIUM BROMIDE 0.5 MG: 0.5 SOLUTION RESPIRATORY (INHALATION) at 14:31

## 2024-03-25 RX ADMIN — VENLAFAXINE 12.5 MG: 25 TABLET ORAL at 17:11

## 2024-03-25 RX ADMIN — MINOCYCLINE HYDROCHLORIDE 200 MG: 50 TABLET, FILM COATED ORAL at 08:43

## 2024-03-25 RX ADMIN — TOPIRAMATE 50 MG: 100 TABLET, FILM COATED ORAL at 08:42

## 2024-03-25 RX ADMIN — POLYETHYLENE GLYCOL 3350 17 G: 17 POWDER, FOR SOLUTION ORAL at 08:42

## 2024-03-25 RX ADMIN — Medication 2 SPRAY: at 02:12

## 2024-03-25 RX ADMIN — Medication 2 SPRAY: at 17:08

## 2024-03-25 RX ADMIN — LAMOTRIGINE 150 MG: 100 TABLET ORAL at 08:43

## 2024-03-25 RX ADMIN — NYSTATIN: 100000 POWDER TOPICAL at 08:44

## 2024-03-25 RX ADMIN — VENLAFAXINE 12.5 MG: 25 TABLET ORAL at 08:43

## 2024-03-25 RX ADMIN — METHYLPREDNISOLONE SODIUM SUCCINATE 20 MG: 40 INJECTION, POWDER, FOR SOLUTION INTRAMUSCULAR; INTRAVENOUS at 08:44

## 2024-03-25 RX ADMIN — DEXTROSE 100 ML/HR: 5 SOLUTION INTRAVENOUS at 08:00

## 2024-03-25 RX ADMIN — PANTOPRAZOLE SODIUM 40 MG: 40 INJECTION, POWDER, FOR SOLUTION INTRAVENOUS at 08:43

## 2024-03-25 RX ADMIN — IPRATROPIUM BROMIDE 0.5 MG: 0.5 SOLUTION RESPIRATORY (INHALATION) at 07:54

## 2024-03-25 RX ADMIN — CHLORHEXIDINE GLUCONATE 0.12% ORAL RINSE 15 ML: 1.2 LIQUID ORAL at 22:37

## 2024-03-25 RX ADMIN — Medication 2 SPRAY: at 09:20

## 2024-03-25 RX ADMIN — LEVALBUTEROL HYDROCHLORIDE 1.25 MG: 1.25 SOLUTION RESPIRATORY (INHALATION) at 21:24

## 2024-03-25 RX ADMIN — TOPIRAMATE 50 MG: 100 TABLET, FILM COATED ORAL at 17:05

## 2024-03-25 RX ADMIN — DEXTROSE 100 ML/HR: 5 SOLUTION INTRAVENOUS at 18:22

## 2024-03-25 RX ADMIN — PANTOPRAZOLE SODIUM 40 MG: 40 INJECTION, POWDER, FOR SOLUTION INTRAVENOUS at 22:37

## 2024-03-25 RX ADMIN — IPRATROPIUM BROMIDE 0.5 MG: 0.5 SOLUTION RESPIRATORY (INHALATION) at 21:24

## 2024-03-25 RX ADMIN — Medication 2 SPRAY: at 05:23

## 2024-03-25 RX ADMIN — NYSTATIN: 100000 POWDER TOPICAL at 17:08

## 2024-03-25 RX ADMIN — SODIUM CHLORIDE SOLN NEBU 3% 4 ML: 3 NEBU SOLN at 21:24

## 2024-03-25 NOTE — PROGRESS NOTES
[facility]  Progress Note: Critical Care  Name: Carla Hunt 59 y.o. female I MRN: [id number]  Unit/Bed#: MICU 12 I Date of Admission: 1/10/2024   Date of Service: 3/25/2024 I Hospital Day: 75    Assessment/Plan   Acute hypoxic respiratory failure;; s/p tracheostomy 3/12; intermittently requiring vent  Bilateral ground glass opacities, persistent, improving  Persistent COVID-19 infection; s/p 14d course of antivirals completed 3/15, superimpoved by #4  Stenotrophomonas pneumonia; recurrent, previously on Bactrim, switched to minocycline 2/2 ELIESER, no resolved, will continue ABX through 3/26  Acute on chronic anemia- multifactorial-blood loss from ostomy site now resolved s/p suturing of bleeding vessel, chronic inflammation, iatrogenic, marrow dysfunction in setting of persistent inflammation likely also contributing  Severe Metabolic encephalopathy, multifactorial including ?#7, improving  Uremia  Hypernatremia  Thrombocytopenia; s/p 1U plt, improving  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; complicated by #11  Wound dehiscence 2/2 poor wound healing s/p wound vac 3/13  B-Cell lymphoma, s/p chemotheraphy 2022, Rituxan maintenance therapy on hold  Minimal SAH POA, no interventions required, resolved on repeat CTHS  Seizure disorder; on home AEDs  Steroid induced hyperglycemia; controlled on insulin gtt  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  -Most recent repeat CTH 3/13 negative for acute intracranial findings.  Has had  extensive neurological workup including MRI/CT neuroimaging, LP with unremarkable findings  -Etiology unclear. Has been off sedation. Electrolytes, sodium, BS at goal. Ammonia normal. May be prolonged 2/2 PNA, possibly worsened by uremic encephalopathy worsened by ELIESER, ?UGIB, however GFR not <15; candida growth seen on BAL Cx from 3/11, may be respiraotry colonization, however fungal infection not ruled in setting of severe lymphopenia, depressed immunoglobulins.      Plan:  -Avoid PRN fentanyl/sedative meds as able.  -Continue modafinil  100mg qAM to stimulate arousal per palliative    -Continue to wean steroids: currently 30 BID day 3/3.   -Thereafter: Wean to 20 mg BID x 3 days, 10 mg BID x 3 days, 10 mg daily x 3 days.  -ABX tx with PNA as below  -Frequent neurochecks   -Can consider MRI brain to evaluate for eval for encephalitis/inflammatory process, however may not   -Video EEG deferred at thist time due to low suspicion for seizures. Less likely non-convulsive status epilepticus as patient is on home AEDs.     Diagnosis: seizure disorder, known history  -S/p partial left temporal lobe resection in 2007 2/2 ?complex migraines  -On Lamictal 150 bid and Topamax 50mg bid  -Last lamotrigine level from 03/02 at 10.7 (therapeutic)     Diagnosis: Anxiety, known history  -On Effexor 12.5 mg TID     CV:  -Diagnosis: Sinus tachycardia  -In setting of blood loss anemia/hypovolemia, improving with PRBC transfusions and IVF  -TTE 3/17 with no major structural dysfunction  -May be 2/2 dehydration/hypvol, acute on chronic anemia, underlying infectious process, pain, less likely Modafinal-induced as patient was persistently tachy prior   Plan:  - See plans under Pulm, Heme/Onc, ID  -Hold further boluses  -Free water flushes        Pulm:  Diagnosis: Acute hypoxic respiratory failure  Diagnosis: Bilateral ground glass opacities, persistent  -Vent dependent; s/p trach 3/12 after was reintubated 3/11 due to  increased WOB,elevated CRP  -Persistently COVID+ s/p multiple courses of antivirals (most recent completed 3/15). Complicated by recurrent bacterial PNA treated w 10d levaquin (completed 2/25) for MDR PNA; most recent BAL +recurrent stenotrophomona treated with Bactrim, swithced to minocyline today 2/2 ELIESER  BAL also with 4+ candida albican; serum fungitell elevated  -Etiology Likely multifactorial including possible COVID pneumonitis vs recurrent PNA vs hypersensitivity pneumonitis 2/2 ?rituxumab. Persistent inflammation likely given patient has been steroid responsive throughout admission.   -CRP increasing but likely from intraabdominal inflammation as patient is noted to have clinical improvement with weaning of steroids, ABx. Fungal source not ruled out in setting of severe lymphopenia, low immunoglobulins.  -Repeat CXR 2/23 with much improvement of multifocal bronchopneumonia     Plan:  -Finished 14d course of Paxlovid/remdesivir on 3/15  -Repeat COVID PCR in consultation with ID today  -Continue minocycline 200mg bid via NGT to tx Stenotrophomonas PNA as below  -Steroid wean- IV Solu-Medrol 30 mg bid today, 20mg  bid tomorrow  -Will hold off on starting empiric antifungal given improvement in neurological exam however may be consideration if clinical status does not improve.  -Follow up BAL viral negative  -Follow up anti-Rituximab ab  -Continue trach collar, wean O2        GI:  Diagnosis: Partial cecal volvulus s/p right hemicolectomy with ostomy pouch in place  -Complicated by poor wound healing of midline incision as evidenced by wound dehiscence s/p wound vac that was removed 3/17 by gen surg.  -Stoma with dark brown output, consistent with prior  Plan:  -Wound vac as per general surgery  -Monitor ostomy pouch output  -Surgery following, will keep them updated if any further changes     :    Diagnosis: Hypernatremia  Worsening to 156 from 150 despite adjusting free water flushes to 350cc q4h from  300cc.  FWD 1.6L, Goal Na 150  Start D5W 100cc x6h    Diagnosis: uremia  ?catabolic from steroids, may also have ?slow UGIB in setting of prolonged steroids though no overt s/s of GIB and H&H stable since 3/23 and patient is on ppi  Slightly trending down  GI consultation for potential EGD inpatient now that patient is HDS.     Diagnosis: ELIESER on CKD III, resolved  -Baseline Cr  0.8-1.2  -Cr remains elevated but improving today, Cr 1.43, BUN remains elevated but improving  -UO adequate, on Ortiz, draining clear yellow urine  -Prerenal azotemia 2/2 hypovolemia, anemia-?blood loss, likely exacerbated by bactrim use (increased Cr secretion), however remains off Bactrim x2d with improvement in serum creatinine  Plan:  -Improving, s/p IVF, s/p prbc transfusions for blood loss anemia on 3/19, 3/21; Hgb stable  -Bactrim switched to minocycline, will continue  -Trend daily BMP  -Continue to monitor I/O and UOP  -Avoid nephrotoxins, contrast dye as able     F/E/N:  F: none  E: monitor and replete for goal K>4, P>3, Mg>2; hypernatremic; see below  N: tube feeds with vital 1.5; 45 cc/h, Prosource liquid protein to 1 packet BID  -Failed bedside speech, Consider barium swallow pending COVID negative status per speech    Heme/Onc:  Diagnosis: Acute on chronic anemia  -Baseline Hgb ~7-8. S/P 2U PRBCs 3/17 after repeat Hgb 5.3, total of 6U transfused since admission.  -Patient had significant blood loss from ostomy site 3/20, resulting in hemorraghic shock, improved with blood transfusion; hemostasis achieved after bleeding source identified and sutured. Another large vol danie bloody output from osotomy on 3/21, bleeding source within ostomy site, sutured.    -Hgb dropping today 8.7, despite hypernatremia/being dry  -May still have slow GIB due to persistent uremia, GI previously consulted for EGD but was deferred due to hemodynamic instability at the time  -?Worsened by impaired marrow response liekly 2/2 persistent inflammation  due to low retic index ~1 prior to most recent transfusions; normocytic with normal B12/folate levels; MCV<100. Iatrogenic cause likely contributing 2/2 frequent blood draws; chronic anemia likely persistent inflammatory state/CKD/lymphoma in setting of elevated ferritin of 974. SPEP/UPEP negative. Hemolysis workup during negative.   Plan:  -Will repeat, H&H, hemolysis workup including LD, haptoglobin, smear, bili  -Routine monitoring ostomy output, if bleeding, apply direct pressure and contact gen surg stat.  -Continue tube feeds/nutritional support  ·Trend CBC, transfuse to maintain Hgb>7     Diagnosis: Thrombocytopenia  -PLT dropping <50 today  -Persistent thrombocytopenia likely in setting of infectious state, known history of B-cell lymphoma.  May also have component of marrow dysfunction in setting of persistent inflammatory state. No objective evidence of liver dysfunction. No known history of von Willebrand's disease.  -Is on heparin product however 4T score suggestive of intermiediate probability of HIT at 14%  Plan:  -Heparin-induced antibody  -Treat underlying anemia as per plan noted above  -Hold Lovenox for DVT ppx     Diagnosis Lymphopenia  -In setting of high dose steroids  -Severely depressed immunoglobulins inclding IgG, IgA, Igm  -At risk for further infections  -Contact and airborne precautions  -Follow up CD4 count      Diagnosis: B cell lymphoma  -[facility]  -S/P 6 cycles of chemotherapy in 20222  -Maintained on Rituxan for 2 year course PTA, started May 2022, last dose was 12/2024, treatment currently on hold in setting of persistent COVID-19 infection  Plan:  -Holding rituximab in setting of persistent COVID-19 infection  -Follow-up CD4 count and percentage  -Follow up anti-Rituximab ab to assess for drug induced hypersensitivity syndrome  -Lymphoma/leukemia flow cytometry      DVT ppx: with lovenox     Endo:  Diagnosis: steroid hyperglycemia and diabetes mellitus type 2,  A1c at 6.6 from 01/2024  -Goal -180  -BG range last 24hrs 113-174  Plan:  -On insulin gtt     ID:  Diagnosis: Recurrent bacterial pneumonia  - Patient has completed multiple treatment courses of remdesivir throughout hospitalization in addition to 7 days of ceftriaxone.  - BAL cultures in 2/2024 positive for MDR Pseudomonas and stenotrophomonas treated with 10d course of Levaquin  - CT C/A/P 3/10 with persistent groundglass opacities and changes suggestive of sequelae of COVID, prior infections.  Completed 10d course of cefepime for broad spectrum coveragge (completed (3/13)  -Repeat BAL cultures from 3/11 showing 4+ growth Stenotrophomonas maltophilia. Could be colonization given prior BAL growth of same, however due to recent intubation with prolonged corticosteroid theraphy, will begin treating as true pathogen. Patient otherwise remains afebrile, no leukocytosis. CRP with down trending.  Previously on Bactrim from 3/13 to 3/18, discontinued due to worsening ELIESER  Plan:  -Continue minocycline 200mg bid, via NGT; hold TF 1hr pre and post minocycline adminstration.  -Plan to treat for 14d course through 3/26 w/ abx  -IV steroids as above     MSK/Skin:  Diagnosis: Midline incision wound with poor wound healing-  -General surgery performed staple removal of the patient's midline incision on 3/12 with some skin signs appreciated at the inferior aspect of the wound without obvious pus drainage. Overnight 3/13, wound dehiscence progressed requiring general surgery reevaluation. Red/brown aspirate noted, fascia intact. Wet-to-dry dressings in place. [facility]  -Poor wound healing in setting of high-dose steroid therapy.  No  exam no obvious signs of infection at this time.   -S/P wound vac replacement 3/13 as per gen surgery. No active concerns for cellulitis.  Plan:  -Wound VAC management as per general surgery  -Wound care for peritracheostomy wound  -Abdominal wound care  as per general surgery  -Routine monitoring     Diagnosis: Critical illness myopathy  -Likely exacerbated by high-dose steroid therapy  Plan:  -Continue to wean steroids as above  -Aggressive PT/OT          Disposition: Critical care    FWD 1.9 (goal sodium 150)  Worsened despite adjusting adjusting free water flushes to 350cc q4h.    Speech barium swallow    ICU Core Measures     Vented Patient  VAP Bundle  VAP bundle ordered     A: Assess, Prevent, and Manage Pain Has pain been assessed? Yes  Need for changes to pain regimen? No   B: Both Spontaneous Awakening Trials (SATs) and Spontaneous Breathing Trials (SBTs) Plan to perform spontaneous awakening trial today? N/A   Plan to perform spontaneous breathing trial today? N/A   Obvious barriers to extubation? NA   C: Choice of Sedation RASS Goal: 0 Alert and Calm or N/A patient not on sedation  Need for changes to sedation or analgesia regimen? No   D: Delirium CAM-ICU: Negative   E: Early Mobility  Plan for early mobility? Yes   F: Family Engagement Plan for family engagement today? Yes       Antibiotic Review: Patient on appropriate coverage based on culture data.     Review of Invasive Devices:    Ortiz Plan: Continue for accurate I/O monitoring for 48 hours        Prophylaxis:  VTE VTE covered by:  enoxaparin, Subcutaneous, 40 mg at 03/24/24 0846       Stress Ulcer  covered bypantoprazole (PROTONIX) injection 40 mg [[id number]]        Significant 24hr Events     Hospital Course: 60 y/o F w/ PMHx B-cell lymphoma s/p chemo 1/2022 on rituximab maintenance, epilepsy on AEDs, CKD3, anxiety. Admitted for acute hypoxic respiratory failure s/p trach 3/12.  Has had a long protracted hospital course since admission to [facility] on 1/7, transferred 1/10 to [facility] for vEEG. Since then with several episodes of respiratory failure requiring high-dose corticosteroid therapy in addition to repeated courses of remdesivir/Paxlovid, broad-spectrum antibiotics.  Also received Actemra, IVIG in  2/2023. Extensive neuro w/u on admission including including LP negative with exception of small SAH, no intervention required. vEEG on admission negative. S/P multiple bronchs, BAL + MDR Pseudomonas/steno PNA, treated with 10d levofloxacin (2/28).  Several days of HFNC after attempted steroid weans. Developed severe epistaxis,  intubated 2/13.  Then developed cecal volvulus s/p R hemicolectomy with ileostomy/ostomy 2/20, poor wound healing due to steroids. Extubated 3/1.  Was doing well until another attempted steroid wean. Reintubated 3/11. Trach 3/12.  CRP down-trending. Course c/b hemorrhagic shock secondary to large volume bleed from ostomy site, hemostasis achieved after suturing the bleeding vessels. Total of 10U transfused for acute blood loss anemia since admission.  H&H stable after most recent transfusions 3/20-21.  Has required intermittent boluses of IV fluids due to collapsed IVC.  Has been persistently tachycardic in the 110s to 120s despite IV fluids, blood transfusions, pain meds, intermittent beta-blocker.   Echo without actual dysfunction.  Overall clinically improving with significant improvement in neurological status.  Participating in aggressive PT OT, improving.  Recent chest x-ray imaging with improved multifocal pneumonia.   Remains COVID-positive, completed antivirals, remains on Bactrim. Rituximab for B-cell lymphoma held 2/2 COVID-19 infection.  Antirituximab antibodies obtain pending.  Severely lymphopenic.  Serum Fungitell positive in setting of most recent BAL culture growing 4+ Candida growth, ID deferring antifungal.  CD4 pending.      24hr events: NAOE reported. Patient denies any pain.      Subjective     Review of Systems   Unable to perform ROS: Acuity of condition        Objective                            Vitals I/O      Most Recent Min/Max in 24hrs   Temp 98.8 °F (37.1 °C) Temp  Min: 98.8 °F (37.1 °C)  Max: 99.8 °F (37.7 °C)   Pulse (!) 122 Pulse  Min: 119  Max: 146   Resp  18 Resp  Min: 15  Max: 29   /72 BP  Min: 101/60  Max: 148/84   O2 Sat 95 % SpO2  Min: 94 %  Max: 99 %   8L 35%     LDA:  Peripheral (R) 5d  Ileostomy RUQ 33d, draining bilious o/p  Wound vac 200cc sanguinous fluid, stable  NGT 19d  Ortiz, <1d, retention protocol   Surgical airway , cuffed 12d   Intake/Output Summary (Last 24 hours) at 3/25/2024 0735  Last data filed at 3/25/2024 0400  Gross per 24 hour   Intake 1587.1 ml   Output 3185 ml   Net -1597.9 ml       Diet Enteral/Parenteral; Tube Feeding No Oral Diet; Vital 1.5; Continuous; 45; Prosource Protein Liquid - One Packet; OD; 350; Water; Every 4 hours    Invasive Monitoring           Physical Exam   Physical Exam  Eyes:      Pupils: Pupils are equal, round, and reactive to light.   Skin:     Findings: Wound present.   HENT:      Nose: Nasogastric tube present. No rhinorrhea.      Mouth/Throat:      Mouth: Mucous membranes are moist.   Neck:      Vascular: No JVD.      Trachea: Tracheostomy present.   Cardiovascular:      Rate and Rhythm: Regular rhythm. Tachycardia present.      Heart sounds: No murmur heard.  Musculoskeletal:      Right lower leg: No edema.      Left lower leg: No edema.   Abdominal: General: An ostomy site is present. There is ostomy site.     Palpations: Abdomen is soft.      Tenderness: There is no abdominal tenderness.      Comments: Wound vac in place   Constitutional:       Appearance: She is ill-appearing.   Pulmonary:      Effort: Pulmonary effort is normal. No accessory muscle usage, respiratory distress or accessory muscle usage.      Breath sounds: Normal breath sounds.   Neurological:      Mental Status: She is alert.      Comments: Follows commands with head nods only   Genitourinary/Anorectal:  Ortiz present.          Diagnostic Studies      EKG: no new  Imaging: no new       Medications:  Scheduled PRN   chlorhexidine, 15 mL, Q12H SARTHAK  enoxaparin, 40 mg, Q24H SARTHAK  ipratropium, 0.5 mg, TID  lamoTRIgine, 150 mg,  BID  levalbuterol, 1.25 mg, TID  methylPREDNISolone sodium succinate, 20 mg, Q12H SARTHAK   Followed by  [START ON 3/28/2024] methylPREDNISolone sodium succinate, 10 mg, Q12H SARTHAK   Followed by  [START ON 3/31/2024] methylPREDNISolone sodium succinate, 10 mg, Daily  minocycline, 200 mg, BID  modafinil, 100 mg, Daily  nystatin, , BID  pantoprazole, 40 mg, Q12H SARTHAK  polyethylene glycol, 17 g, Daily  sodium chloride, 2 spray, Q4H  sodium chloride, 4 mL, TID  topiramate, 50 mg, BID  venlafaxine, 12.5 mg, TID With Meals      acetaminophen, 650 mg, Q6H PRN  fentaNYL, 50 mcg, Q1H PRN  ondansetron, 4 mg, Q6H PRN  sodium chloride, 1 Application, Q1H PRN       Continuous    dextrose, 100 mL/hr  insulin regular (HumuLIN R,NovoLIN R) 1 Units/mL in sodium chloride 0.9 % 100 mL infusion, 0.3-21 Units/hr, Last Rate: 4 Units/hr (03/25/24 0615)         Labs:    CBC    Recent Labs     03/24/24 0437 03/25/24  0523   WBC 1.78* 1.31*   HGB 10.4* 8.7*   HCT 33.0* 27.6*   PLT 54* 40*     BMP    Recent Labs     03/24/24 0437 03/25/24  0523   SODIUM 150* 156*   K 4.4 4.3   * 124*   CO2 23 23   AGAP 8 9   BUN 68* 72*   CREATININE 0.99 0.89   CALCIUM 9.2 9.4       Coags    No recent results     Additional Electrolytes  Recent Labs     03/24/24 0437 03/25/24  0523   MG 2.3 2.4   PHOS 3.8 3.7          Blood Gas    No recent results  No recent results LFTs  Recent Labs     03/24/24 0437   ALT 41   AST 13   ALKPHOS 115*   ALB 2.5*   TBILI 0.43       Infectious  No recent results     BAL 3/11   --4+ steno malto., 2+ candida  --Viral Cx neg  --Fungal 4+ candida  CMV neg  PCP smear neg  COVID pcr + on 3/11 Glucose  Recent Labs     03/24/24 0437 03/25/24  0523   GLUC 154* 152*               Joe Lazcano DO

## 2024-03-25 NOTE — PROGRESS NOTES
[facility]  Progress Note  Name: Carla Hunt I  MRN: [id number]  Unit/Bed#: MICU 12 I Date of Admission: 1/10/2024   Date of Service: 3/25/2024 I Hospital Day: 75    Assessment/Plan   Goals of care, counseling/discussion  Assessment & Plan  Code Status: full - Level 1  Ongoing medical optimization without limits at this time. Hopeful for slow recovery. Min does express understanding of periodic setbacks but is hoping Carla can build some momentum to become medically stable for d/c to rehab.     Palliative care patient  Assessment & Plan  Time spent providing supportive listening to spouse Min at bedside  Also supported by her daughter, son, and robust  group of friends, brother, and [place] community  Decisional apparatus:  Patient is not competent on my exam today.  If competence is lost, patient's substitute decision maker would default to spouse Min by [place] Act 169.  Advance Directive / Living Will / POLST:  None on file, unable to complete    Anxiety state  Assessment & Plan  Continue Effexor per primary  Patient does not appear anxious during time of encounter    * Acute respiratory failure with hypoxia (HCC)  Assessment & Plan  Patient was re-intubated 3/11 with subsequent bedside trach on 3/12.   Very careful steroid wean  per critical care team.   On trach collar during time of encounter.       Interval history:       No events over the weekend. SLP has been working with patient. Using PMV periodically. Worked with PT/OT this AM and was able to stand at bedside. Spouse and daughter both at bedside and very pleased with her progress. No questions or concerns. Patient herself only briefly opens eyes before returning to sleep. Family admits she's quite tired after a rigorous therapy session. Carla appears comfortable.     MEDICATIONS / ALLERGIES:     all current active meds have been reviewed    No Known Allergies    OBJECTIVE:    Physical Exam  Physical  Exam  Constitutional:       General: She is not in acute distress.  HENT:      Head: Atraumatic.   Eyes:      Conjunctiva/sclera: Conjunctivae normal.   Neck:      Comments: Trach in place, CDI  Cardiovascular:      Rate and Rhythm: Normal rate.   Pulmonary:      Comments: On trach collar  Abdominal:      Tenderness: There is no guarding.      Comments: NGT in place   Musculoskeletal:         General: Swelling present.   Skin:     General: Skin is warm and dry.   Neurological:      Comments: Opens eyes briefly before returning to rest   Psychiatric:      Comments: calm         Lab Results:   Results from last 7 days   Lab Units 03/25/24  0835 03/25/24  0523 03/24/24  0437 03/23/24  0519 03/20/24  0457 03/19/24  0543   WBC Thousand/uL 1.20* 1.31* 1.78* 2.43*   < > 9.92   HEMOGLOBIN g/dL 8.8* 8.7* 10.4* 9.7*   < > 9.5*   I STAT HEMOGLOBIN   --   --   --   --    < >  --    HEMATOCRIT % 27.8* 27.6* 33.0* 29.9*   < > 28.1*   HEMATOCRIT, ISTAT   --   --   --   --    < >  --    PLATELETS Thousands/uL 38* 40* 54* 68*   < > 86*   NEUTROS PCT % 78*  --   --   --   --   --    MONOS PCT % 2*  --   --   --   --   --    MONO PCT %  --   --   --  2*  --  1*   EOS PCT % 0  --   --  0  --  0    < > = values in this interval not displayed.     Results from last 7 days   Lab Units 03/25/24  0523 03/24/24  0437 03/23/24  0519 03/20/24  1333 03/20/24  0831 03/20/24  0502 03/19/24  0543   POTASSIUM mmol/L 4.3 4.4 4.8   < >  --    < > 4.7   CHLORIDE mmol/L 124* 119* 116*   < >  --    < > 107   CO2 mmol/L 23 23 21   < >  --    < > 17*   CO2, I-STAT mmol/L  --   --   --   --  15*  --   --    BUN mg/dL 72* 68* 75*   < >  --    < > 85*   CREATININE mg/dL 0.89 0.99 1.12   < >  --    < > 1.79*   CALCIUM mg/dL 9.4 9.2 8.8   < >  --    < > 8.4   ALK PHOS U/L  --  115* 98  --   --   --  141*   ALT U/L  --  41 40  --   --   --  64*   AST U/L  --  13 21  --   --   --  29   GLUCOSE, ISTAT mg/dl  --   --   --   --  118  --   --     < > = values in  this interval not displayed.       Imaging Studies: reviewed pertinent studies   EKG, Pathology, and Other Studies: reviewed pertinent studies    Counseling / Coordination of Care    Total floor / unit time spent today 25 minutes. Greater than 50% of total time was spent with the patient and / or family counseling and / or coordination of care. A description of the counseling / coordination of care: time spent assessing patient, providing support, communication with family at bedside and RN.

## 2024-03-25 NOTE — CONSULTS
Medical Oncology/Hematology Consult Note  Carla Hunt, female, 59 y.o., 1965,  MICU 12/MICU 12, [id number]           Reason for consultation: Pancytopenia     History of present illness:  Carla Hunt is a 59 y.o. 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 Also, had wound dehiscence, acute respiratory failure due to persistent COVID infection (completed Paxlovid and Remdesivir 3/15, on Solumedrol, which was decreased 3/25 to 20 mg BID) and superimposed Stenotrophomonas pneumonia (s/p different courses of ABX, lately on Minocycline), s/p tracheostomy (3/12/24), Acute Kidney Injury on CKD III , and hypernatremia Na > 150.       Assessment and Plan  1. Teto marginal zone lymphoma, stage IV, with 14q+ and 18q+   2. Acute Pancytopenia    Established with Dr. Marv Quigley , [facility].   Was diagnosed in 6/2021, when found to have multiple bilateral cervical LN & hypermetabolic osseous foci & increased activity in the spleen on PET scan. NMZL confirmed by bone marrow biopsy 7/15/21.   s/p Bendamustine-Rituximab x 6 cycles [8/17/21 - 1/3/22] , achieved CR, followed by maintenance Rituximab [5/2022- last was given on 12/2024].     WBC dropped 1/7 - 1/12/24, and again trending down since 2/23. Noted that the WBC drops improve with the high dose steroids and then becomes leukopenic on tapering off/lower steroid.   S/p Dexamethasone 1/09 - 01/10 & 1/22 - 1/28   Solumedrol started (2/7), then up to 250 mg BID, --- taper ,on 20 mg BID (3/25)      B12 & folate were WNL; iron panel  with mixed picture suggestive of chronic/inflammatory disease as well as iron deficiency component (iron <10, ferritin 974 & TIBC <173); no schistocytes, haptoglobin 279, Cr 0.89. SPEP/UPEP both with no monoclonal immunoglobulins. FLC K/L ratio of 1.20.    Patient's Pancytopenia is most likely multifactorial, including patient's history of marginal zone lymphoma with bone marrow involvement, history of Bendamustine (alkylating agent) more than the Rituximab, also patient with recent complex multiple inciting events and illness as listed above including the surgery, hemorrhagic shock and multiple lines of antibiotics including cephalosporins. Patient was also noted on Lamotrigine for seizure disorder, which unfortunately reported to possible cause severe cases of neutropenia/agranulocytosis.     Plan & recommendation as was discussed and reviewed with my attending, Dr. Coronado:     We discussed with primary team , Dr. Lazcano, that if appropriate & possible per neurology team we recommend considering alternative than lamotrigine.     Given patient's hx and above, Need to rule out bone marrow disorder; explained to  at bedside, they agree and IR consultation for bone marrow biopsy in process (GEN Path form is completed in patient's folder).     Agree with holding off further Rituximab.     It is a controversial/debatable topic if IV iron use in critically ill patient is associated with negative outcome or not, different research findings; if patient's infection status is deemed under control as per ID and CC teams, patient may benefit form Iron supplementation; safer alternative if IV not possible, would be PO supplementation 325 mg daily.      Transfuse Leukoreduced irradiated RBC for Hgb < 7.0 & platelets for < 30 K (or less than 50-k with bleeding)       _____________________________    Outpatient follow up plan: patient to follow up after discharge with her primary hematology/oncology team   Communication with  patient/family:  I did update the patient, as well as her     Communication with team:  Did communicate with Dr. Lazcano, primary team.   I did review this patient with Dr. Coronado and he is in agreement with this plan.        Subjective/ROS   Very limited due to patient's weakness and illness status, deemed to ill and stressful to talk in complete sentences. We discussed with patient's  at bedside. Reported ROS and hx as above obtained per chart review,  and discussing with primary team. Patient's  understand A&P as was explained to him and agree to the bone marrow biopsy plan.     Oncology History:   Cancer Staging   No matching staging information was found for the patient.    Oncology History    No history exists.       Past Medical History:   Past Medical History:   Diagnosis Date    Hx of hypercalcemia     Lymphoma (HCC) 2021    Parathyroid disease (HCC)     Seizures (HCC)     Situational depression     24 yr old son  from drug overdose       Past Surgical History:   Procedure Laterality Date    CRANIOTOMY FOR TEMPORAL LOBECTOMY Left     TN LAPS ABD PRTM&OMENTUM DX W/WO SPEC BR/WA SPX N/A 2024    Procedure: LAPAROSCOPY DIAGNOSTIC,EXPLORATORY LAPAROTOMY, RIGHT KARY COLECTOMY,ILEOSTOMY, MUCUS FISTULA;  Surgeon: Lauryn Ullrich, DO;  Location: [facility];  Service: General       Family History   Problem Relation Age of Onset    Diabetes Mother     Cancer Father        Social History     Socioeconomic History    Marital status: /Civil Union     Spouse name: None    Number of children: None    Years of education: None    Highest education level: None   Occupational History    None   Tobacco Use    Smoking status: Never    Smokeless tobacco: Never   Vaping Use    Vaping status: Never Used   Substance and Sexual Activity    Alcohol use: Not Currently    Drug use: Never    Sexual activity: None   Other Topics Concern    None   Social History Narrative    None     Social  Determinants of Health     Financial Resource Strain: Not on file   Food Insecurity: No Food Insecurity (1/11/2024)    Hunger Vital Sign     Worried About Running Out of Food in the Last Year: Never true     Ran Out of Food in the Last Year: Never true   Transportation Needs: No Transportation Needs (1/11/2024)    PRAPARE - Transportation     Lack of Transportation (Medical): No     Lack of Transportation (Non-Medical): No   Physical Activity: Not on file   Stress: Not on file   Social Connections: Not on file   Intimate Partner Violence: Not on file   Housing Stability: Low Risk  (1/11/2024)    Housing Stability Vital Sign     Unable to Pay for Housing in the Last Year: No     Number of Places Lived in the Last Year: 1     Unstable Housing in the Last Year: No         Current Facility-Administered Medications:     acetaminophen (TYLENOL) tablet 650 mg, 650 mg, Oral, Q6H PRN, Moises Bowden MD    chlorhexidine (PERIDEX) 0.12 % oral rinse 15 mL, 15 mL, Mouth/Throat, Q12H SARTHAK, Moises Bowden MD, 15 mL at 03/25/24 0843    dextrose 5 % infusion, 100 mL/hr, Intravenous, Continuous, Joe Lazcano, DO, Last Rate: 100 mL/hr at 03/25/24 0800, 100 mL/hr at 03/25/24 0800    fentaNYL injection 50 mcg, 50 mcg, Intravenous, Q1H PRN, Moises Bowden MD, 50 mcg at 03/21/24 1344    insulin regular (HumuLIN R,NovoLIN R) 1 Units/mL in sodium chloride 0.9 % 100 mL infusion, 0.3-21 Units/hr, Intravenous, Titrated, Amdandre Lazcano DO, Last Rate: 4 mL/hr at 03/25/24 1200, 4 Units/hr at 03/25/24 1200    ipratropium (ATROVENT) 0.02 % inhalation solution 0.5 mg, 0.5 mg, Nebulization, TID, Moises Bowden MD, 0.5 mg at 03/25/24 0754    lamoTRIgine (LaMICtal) tablet 150 mg, 150 mg, Oral, BID, Moises Bowden MD, 150 mg at 03/25/24 0843    levalbuterol (XOPENEX) inhalation solution 1.25 mg, 1.25 mg, Nebulization, TID, Moises Bowden MD, 1.25 mg at 03/25/24 0754    [COMPLETED] methylPREDNISolone sodium succinate (Solu-MEDROL) injection 50 mg, 50 mg, Intravenous, Q6H SARTHAK, 50 mg  at 03/16/24 1830 **FOLLOWED BY** [COMPLETED] methylPREDNISolone sodium succinate (Solu-MEDROL) injection 40 mg, 40 mg, Intravenous, Q6H SARTHAK, 40 mg at 03/19/24 1756 **FOLLOWED BY** [COMPLETED] methylPREDNISolone sodium succinate (Solu-MEDROL) injection 30 mg, 30 mg, Intravenous, Q8H SARTHAK, 30 mg at 03/21/24 2246 **FOLLOWED BY** [DISCONTINUED] methylPREDNISolone sodium succinate (Solu-MEDROL) injection 20 mg, 20 mg, Intravenous, Q6H SARTHAK **FOLLOWED BY** [DISCONTINUED] methylPREDNISolone sodium succinate (Solu-MEDROL) injection 10 mg, 10 mg, Intravenous, Q6H SARTHAK **FOLLOWED BY** [DISCONTINUED] methylPREDNISolone sodium succinate (Solu-MEDROL) injection 10 mg, 10 mg, Intravenous, Q8H SARTHAK **FOLLOWED BY** [DISCONTINUED] methylPREDNISolone sodium succinate (Solu-MEDROL) injection 10 mg, 10 mg, Intravenous, Q12H SARTHAK **FOLLOWED BY** [DISCONTINUED] methylPREDNISolone sodium succinate (Solu-MEDROL) injection 10 mg, 10 mg, Intravenous, Daily **FOLLOWED BY** [COMPLETED] methylPREDNISolone sodium succinate (Solu-MEDROL) injection 30 mg, 30 mg, Intravenous, Q12H SARTHAK, 30 mg at 03/24/24 2203 **FOLLOWED BY** methylPREDNISolone sodium succinate (Solu-MEDROL) injection 20 mg, 20 mg, Intravenous, Q12H SARTHAK, 20 mg at 03/25/24 0844 **FOLLOWED BY** [START ON 3/28/2024] methylPREDNISolone sodium succinate (Solu-MEDROL) injection 10 mg, 10 mg, Intravenous, Q12H SARTHAK **FOLLOWED BY** [START ON 3/31/2024] methylPREDNISolone sodium succinate (Solu-MEDROL) injection 10 mg, 10 mg, Intravenous, Daily, Emilie Soyeon Kim, MD    minocycline (DYNACIN) tablet 200 mg, 200 mg, Per NG Tube, BID, Betzaida Sr MD, 200 mg at 03/25/24 0843    nystatin (MYCOSTATIN) powder, , Topical, BID, Moises Bowden MD, Given at 03/25/24 0844    ondansetron (ZOFRAN) injection 4 mg, 4 mg, Intravenous, Q6H PRN, Moises Bowden MD    pantoprazole (PROTONIX) injection 40 mg, 40 mg, Intravenous, Q12H SARTHAKMoises MD, 40 mg at 03/25/24 0843    polyethylene glycol (MIRALAX) packet 17 g,  "17 g, Per NG Tube, Daily, Moises Bowden MD, 17 g at 03/25/24 0842    sodium chloride (AYR SALINE NASAL) nasal gel 1 Application, 1 Application, Nasal, Q1H PRN, Moises Bowden MD    sodium chloride (OCEAN) 0.65 % nasal spray 2 spray, 2 spray, Each Nare, Q4H, Moises Bowden MD, 2 spray at 03/25/24 0920    sodium chloride 3 % inhalation solution 4 mL, 4 mL, Nebulization, TID, Moises Bowden MD, 4 mL at 03/25/24 0754    topiramate (TOPAMAX) 6 mg/ml oral suspension 50 mg, 50 mg, Per PEG Tube, BID, Moises Bowden MD, 50 mg at 03/25/24 0842    venlafaxine (EFFEXOR) tablet 12.5 mg, 12.5 mg, Oral, TID With Meals, Moises Bowden MD, 12.5 mg at 03/25/24 1217    Medications Prior to Admission   Medication    busPIRone (BUSPAR) 5 mg tablet    escitalopram (LEXAPRO) 10 mg tablet    ibuprofen (MOTRIN) 600 mg tablet    lamoTRIgine (LaMICtal) 200 MG tablet    lamoTRIgine (LaMICtal) 200 MG tablet    medroxyPROGESTERone acetate (DEPO-PROVERA SYRINGE) 150 mg/mL injection    ondansetron (ZOFRAN-ODT) 4 mg disintegrating tablet    topiramate (TOPAMAX) 100 mg tablet    topiramate (TOPAMAX) 100 mg tablet    venlafaxine (EFFEXOR-XR) 75 mg 24 hr capsule       No Known Allergies      Physical Exam:     /75   Pulse (!) 121   Temp 98.4 °F (36.9 °C) (Oral)   Resp 20   Ht 5' 8\" (1.727 m)   Wt 79 kg (174 lb 2.6 oz)   SpO2 96%   BMI 26.48 kg/m²     Physical Exam  - GEN: Appears very ill, with generalized shakiness while being helped to sit up at side of bed with the PT team , alert and oriented x ? Deemed too tired to engage in sentences conversation.   - HEENT: Anicteric, mucous membranes dry, PERRL and EOMI   - NECK: No lymphadenopathy, JVD or carotid bruits   - HEART: regular rhythm , tachycardia ~ 110-120, normal S1 and S2, no murmurs, clicks, gallops or rubs   - LUNGS: Clear to auscultation bilaterally; no wheezes, rales, or rhonchi  - ABDOMEN: mildly distended; no organomegaly or masses felt on exam.   - EXTREMITIES: Peripheral pulses normal; no " clubbing, cyanosis; 1+ bilateral LE edema   - NEURO: No focal findings, CN II-XII are grossly intact.   - Musculoskeletal: generalized weakness noted , normal ROM, no swollen or erythematous joints.   - SKIN: pale , but otherwise Normal without suspicious lesions on exposed skin      Recent Results (from the past 48 hour(s))   Fingerstick Glucose (POCT)    Collection Time: 03/23/24 12:58 PM   Result Value Ref Range    POC Glucose 116 65 - 140 mg/dl   Fingerstick Glucose (POCT)    Collection Time: 03/23/24  2:11 PM   Result Value Ref Range    POC Glucose 108 65 - 140 mg/dl   Fingerstick Glucose (POCT)    Collection Time: 03/23/24  4:14 PM   Result Value Ref Range    POC Glucose 146 (H) 65 - 140 mg/dl   Fingerstick Glucose (POCT)    Collection Time: 03/23/24  5:56 PM   Result Value Ref Range    POC Glucose 191 (H) 65 - 140 mg/dl   Fingerstick Glucose (POCT)    Collection Time: 03/23/24  8:30 PM   Result Value Ref Range    POC Glucose 148 (H) 65 - 140 mg/dl   Fingerstick Glucose (POCT)    Collection Time: 03/23/24 10:06 PM   Result Value Ref Range    POC Glucose 142 (H) 65 - 140 mg/dl   Fingerstick Glucose (POCT)    Collection Time: 03/24/24 12:05 AM   Result Value Ref Range    POC Glucose 132 65 - 140 mg/dl   Fingerstick Glucose (POCT)    Collection Time: 03/24/24  1:58 AM   Result Value Ref Range    POC Glucose 151 (H) 65 - 140 mg/dl   Fingerstick Glucose (POCT)    Collection Time: 03/24/24  4:11 AM   Result Value Ref Range    POC Glucose 162 (H) 65 - 140 mg/dl   Magnesium    Collection Time: 03/24/24  4:37 AM   Result Value Ref Range    Magnesium 2.3 1.9 - 2.7 mg/dL   CBC and differential    Collection Time: 03/24/24  4:37 AM   Result Value Ref Range    WBC 1.78 (L) 4.31 - 10.16 Thousand/uL    RBC 3.45 (L) 3.81 - 5.12 Million/uL    Hemoglobin 10.4 (L) 11.5 - 15.4 g/dL    Hematocrit 33.0 (L) 34.8 - 46.1 %    MCV 96 82 - 98 fL    MCH 30.1 26.8 - 34.3 pg    MCHC 31.5 31.4 - 37.4 g/dL    RDW 18.9 (H) 11.6 - 15.1 %     MPV 12.0 8.9 - 12.7 fL    Platelets 54 (L) 149 - 390 Thousands/uL    nRBC 0 /100 WBCs   Comprehensive metabolic panel    Collection Time: 03/24/24  4:37 AM   Result Value Ref Range    Sodium 150 (H) 135 - 147 mmol/L    Potassium 4.4 3.5 - 5.3 mmol/L    Chloride 119 (H) 96 - 108 mmol/L    CO2 23 21 - 32 mmol/L    ANION GAP 8 4 - 13 mmol/L    BUN 68 (H) 5 - 25 mg/dL    Creatinine 0.99 0.60 - 1.30 mg/dL    Glucose 154 (H) 65 - 140 mg/dL    Calcium 9.2 8.4 - 10.2 mg/dL    Corrected Calcium 10.4 (H) 8.3 - 10.1 mg/dL    AST 13 13 - 39 U/L    ALT 41 7 - 52 U/L    Alkaline Phosphatase 115 (H) 34 - 104 U/L    Total Protein 5.0 (L) 6.4 - 8.4 g/dL    Albumin 2.5 (L) 3.5 - 5.0 g/dL    Total Bilirubin 0.43 0.20 - 1.00 mg/dL    eGFR 63 ml/min/1.73sq m   Phosphorus    Collection Time: 03/24/24  4:37 AM   Result Value Ref Range    Phosphorus 3.8 2.7 - 4.5 mg/dL   Fingerstick Glucose (POCT)    Collection Time: 03/24/24  6:20 AM   Result Value Ref Range    POC Glucose 164 (H) 65 - 140 mg/dl   Fingerstick Glucose (POCT)    Collection Time: 03/24/24  8:14 AM   Result Value Ref Range    POC Glucose 134 65 - 140 mg/dl   Fingerstick Glucose (POCT)    Collection Time: 03/24/24 10:10 AM   Result Value Ref Range    POC Glucose 139 65 - 140 mg/dl   Fingerstick Glucose (POCT)    Collection Time: 03/24/24 12:19 PM   Result Value Ref Range    POC Glucose 194 (H) 65 - 140 mg/dl   Fingerstick Glucose (POCT)    Collection Time: 03/24/24  2:01 PM   Result Value Ref Range    POC Glucose 273 (H) 65 - 140 mg/dl   Fingerstick Glucose (POCT)    Collection Time: 03/24/24  3:49 PM   Result Value Ref Range    POC Glucose 221 (H) 65 - 140 mg/dl   Fingerstick Glucose (POCT)    Collection Time: 03/24/24  6:03 PM   Result Value Ref Range    POC Glucose 173 (H) 65 - 140 mg/dl   Fingerstick Glucose (POCT)    Collection Time: 03/24/24  8:09 PM   Result Value Ref Range    POC Glucose 121 65 - 140 mg/dl   Fingerstick Glucose (POCT)    Collection Time: 03/24/24 10:11  PM   Result Value Ref Range    POC Glucose 63 (L) 65 - 140 mg/dl   Fingerstick Glucose (POCT)    Collection Time: 03/24/24 11:50 PM   Result Value Ref Range    POC Glucose 106 65 - 140 mg/dl   Fingerstick Glucose (POCT)    Collection Time: 03/25/24  2:13 AM   Result Value Ref Range    POC Glucose 128 65 - 140 mg/dl   Fingerstick Glucose (POCT)    Collection Time: 03/25/24  4:42 AM   Result Value Ref Range    POC Glucose 136 65 - 140 mg/dl   C-reactive protein    Collection Time: 03/25/24  5:23 AM   Result Value Ref Range    .9 (H) <3.0 mg/L   Magnesium    Collection Time: 03/25/24  5:23 AM   Result Value Ref Range    Magnesium 2.4 1.9 - 2.7 mg/dL   CBC and differential    Collection Time: 03/25/24  5:23 AM   Result Value Ref Range    WBC 1.31 (L) 4.31 - 10.16 Thousand/uL    RBC 2.86 (L) 3.81 - 5.12 Million/uL    Hemoglobin 8.7 (L) 11.5 - 15.4 g/dL    Hematocrit 27.6 (L) 34.8 - 46.1 %    MCV 97 82 - 98 fL    MCH 30.4 26.8 - 34.3 pg    MCHC 31.5 31.4 - 37.4 g/dL    RDW 18.3 (H) 11.6 - 15.1 %    MPV 12.6 8.9 - 12.7 fL    Platelets 40 (L) 149 - 390 Thousands/uL    nRBC 0 /100 WBCs   Basic metabolic panel    Collection Time: 03/25/24  5:23 AM   Result Value Ref Range    Sodium 156 (H) 135 - 147 mmol/L    Potassium 4.3 3.5 - 5.3 mmol/L    Chloride 124 (H) 96 - 108 mmol/L    CO2 23 21 - 32 mmol/L    ANION GAP 9 4 - 13 mmol/L    BUN 72 (H) 5 - 25 mg/dL    Creatinine 0.89 0.60 - 1.30 mg/dL    Glucose 152 (H) 65 - 140 mg/dL    Calcium 9.4 8.4 - 10.2 mg/dL    eGFR 71 ml/min/1.73sq m   Phosphorus    Collection Time: 03/25/24  5:23 AM   Result Value Ref Range    Phosphorus 3.7 2.7 - 4.5 mg/dL   Retic Count    Collection Time: 03/25/24  5:23 AM   Result Value Ref Range    Retic Ct Abs 28,400 14,097 - 95,744    Retic Ct Pct 1.01 0.37 - 1.87 %   Peripheral Smear    Collection Time: 03/25/24  5:23 AM   Result Value Ref Range    Total Counted 100     Segmented % 52 %    Bands % 30 (H) 0 - 8 %    Lymphocytes % 9 %    Monocytes  % 4 4 - 12 %    Metamyelocytes % 5 (H) 0 - 1 %    Absolute Neutrophils 1.07 (L) 1.81 - 6.82 Thousand/uL    Absolute Lymphocytes 0.12 (L) 0.60 - 4.47 Thousand/uL    Absolute Monocytes 0.05 0.00 - 1.22 Thousand/uL    Absolute Metamyelocytes 0.07 0.00 - 0.10 Thousand/uL    Toxic Granulation Present     Platelet Estimate Decreased (A) Adequate    Anisocytosis Present     Poikilocytes Present    Bilirubin, total    Collection Time: 03/25/24  5:23 AM   Result Value Ref Range    Total Bilirubin 0.53 0.20 - 1.00 mg/dL   Fingerstick Glucose (POCT)    Collection Time: 03/25/24  6:15 AM   Result Value Ref Range    POC Glucose 153 (H) 65 - 140 mg/dl   Fingerstick Glucose (POCT)    Collection Time: 03/25/24  7:53 AM   Result Value Ref Range    POC Glucose 157 (H) 65 - 140 mg/dl   CBC and differential    Collection Time: 03/25/24  8:35 AM   Result Value Ref Range    WBC 1.20 (L) 4.31 - 10.16 Thousand/uL    RBC 2.85 (L) 3.81 - 5.12 Million/uL    Hemoglobin 8.8 (L) 11.5 - 15.4 g/dL    Hematocrit 27.8 (L) 34.8 - 46.1 %    MCV 98 82 - 98 fL    MCH 30.9 26.8 - 34.3 pg    MCHC 31.7 31.4 - 37.4 g/dL    RDW 18.2 (H) 11.6 - 15.1 %    MPV 12.9 (H) 8.9 - 12.7 fL    Platelets 38 (L) 149 - 390 Thousands/uL    nRBC 0 /100 WBCs    Neutrophils Relative 78 (H) 43 - 75 %    Immature Grans % 13 (H) 0 - 2 %    Lymphocytes Relative 5 (L) 14 - 44 %    Monocytes Relative 2 (L) 4 - 12 %    Eosinophils Relative 0 0 - 6 %    Basophils Relative 2 (H) 0 - 1 %    Neutrophils Absolute 0.94 (L) 1.85 - 7.62 Thousands/µL    Absolute Immature Grans 0.16 0.00 - 0.20 Thousand/uL    Absolute Lymphocytes 0.06 (L) 0.60 - 4.47 Thousands/µL    Absolute Monocytes 0.02 (L) 0.17 - 1.22 Thousand/µL    Eosinophils Absolute 0.00 0.00 - 0.61 Thousand/µL    Basophils Absolute 0.02 0.00 - 0.10 Thousands/µL   Fingerstick Glucose (POCT)    Collection Time: 03/25/24 10:03 AM   Result Value Ref Range    POC Glucose 167 (H) 65 - 140 mg/dl       VAS upper limb venous duplex scan,  unilateral/limited    Result Date: 3/24/2024  Narrative:  [facility] REPORT CLINICAL: Indications: Patient presents with left upper extremity edema x few days. Risk Factors The patient has history of CKD.  FINDINGS:  Left                       Thrombus           Cephalic Upper Arm Distal  Acute - Occlusive  Ceph AC                    Acute - Occlusive     CONCLUSION:  Impression RIGHT UPPER LIMB LIMITED: Unable to evaluate the neck veins due to medical devises.  LEFT UPPER LIMB: No evidence of acute or chronic deep vein thrombosis. Unable to evaluate IJV and innominate vein due medical devises. There is evidence of acute occlusive superficial thrombophlebitis noted in the cephalic vein at the elbow and distal upper arm. Doppler evaluation shows a normal response to augmentation maneuvers.  Technical findings were given to Dr. Sindi Recinos.  SIGNATURE: Electronically Signed by: ABIMAEL GONZALES DO on 2024-03-24 09:56:59 PM    XR chest portable    Result Date: 3/22/2024  Narrative: XR CHEST PORTABLE INDICATION: hypoxia. COMPARISON: Compared with 3/20/2024 FINDINGS: Tracheostomy tube. Feeding tube in the stomach. Mild prominent interstitial markings. No pneumothorax or pleural effusion. Normal cardiomediastinal silhouette. Bones are unremarkable for age. Normal upper abdomen.     Impression: Mild congestive changes. Workstation performed: [id number]     XR chest portable ICU    Result Date: 3/20/2024  Narrative: XR CHEST PORTABLE ICU INDICATION: f/u pneumonia. COMPARISON: Radiograph March 14, 2024 FINDINGS: Tracheostomy tube in place. Enteric tube coursing into the stomach. Multifocal patchy airspace opacities in a bronchiolar distribution, particularly in the lower lobes, overall slightly improved. No pneumothorax or pleural effusion. Normal cardiomediastinal silhouette. Bones are unremarkable for age. Normal upper abdomen.     Impression: Overall mild improvement in multifocal bronchopneumonia, likely aspiration  related. Workstation performed: [id number]     Bronchoscopy    Result Date: 3/19/2024  Narrative: Table formatting from the original result was not included. [facility] [address]  [place] [address] [phone] DATE OF SERVICE: 3/11/24 PHYSICIAN(S): Attending: No Staff Documented Fellow: No Staff Documented INDICATION: Acute respiratory failure with hypoxia (HCC) POST-OP DIAGNOSIS: See the impression below. PREPROCEDURE: Standard airway preparation completed per respiratory therapy protocol.  Informed consent was obtained. Images reviewed prior to the procedure.  A Time Out was performed. No suspicion or identified risk for TB or other airborne infectious disease; bronchoscopy procedure being performed for diagnostic purposes. PROCEDURE: Bronchoscopy DETAILS OF PROCEDURE: Patient was taken to the procedure room where a time out was performed to confirm correct patient and correct procedure. The patient's blood pressure, heart rate, level of consciousness and oxygen were monitored throughout the procedure. The procedure was not difficult. The patient tolerated the procedure well. There were no apparent adverse events. ANESTHESIA INFORMATION: ASA: ASA status not filed in the log. Anesthesia Type: Anesthesia type not filed in the log. FINDINGS: Normal SPECIMENS: ID Type Source Tests Collected by Time Destination A :  Bronchial Lung, Left Lower Lobe Bronchial Washing FUNGAL CULTURE, VIRUS CULTURE, BRONCHIAL CULTURE AND GRAM STAIN, LEGIONELLA CULTURE, PNEUMOCYSTIS SMEAR BY DFA ([facility]), AFB CULTURE WITH STAIN, LEUKEMIA/LYMPHOMA FLOW CYTOMETRY, CD4/CD8 BRONCHIAL ONLY, NOVEL CORONAVIRUS (COVID-19), PCR [facility] Jarett Martins MD 3/11/2024  1:06 PM       Impression: Pneumonia RECOMMENDATION:  Follow up bronchoscopy     Bronchoscopy    Result Date: 3/17/2024  Narrative: Table formatting from the original result was not included. [facility] [address]   [place] [address] [phone] DATE OF SERVICE: 3/17/24 PHYSICIAN(S): Attending: Jarett Martins MD Fellow: Miguel Interiano MD INDICATION: Pneumonia of both lungs due to infectious organism, unspecified part of lung POST-OP DIAGNOSIS: See the impression below. PREPROCEDURE: Standard airway preparation completed per respiratory therapy protocol.  Informed consent was obtained. Images reviewed prior to the procedure.  A Time Out was performed. No suspicion or identified risk for TB or other airborne infectious disease; bronchoscopy procedure being performed for diagnostic purposes. PROCEDURE: Bronchoscopy DETAILS OF PROCEDURE: Bronchoscope was brought to patient's bedside where a time out was performed to confirm correct patient and correct procedure. The patient was already under sedation prior to the procedure. The patient's blood pressure, heart rate, oxygen and respirations were monitored throughout the procedure. The patient experienced no blood loss. The scope was introduced through the tracheostomy. The procedure was not difficult. The patient tolerated the procedure well. There were no apparent adverse events. ANESTHESIA INFORMATION: ASA: ASA status cannot be found on the log. Anesthesia Type: Anesthesia type cannot be found on the log. FINDINGS: The upper trachea, middle trachea, lower trachea and main tracee appeared normal. Moderate, generalized erythematous and friable mucosa in the left main stem, MANDY, LLL, right main stem, RUL, bronchus intermedius, RML and RLL Moderate, thick and purulent secretions present in the left main stem, MANDY, lingula, LLL, right main stem, RUL, bronchus intermedius, RML and RLL; secretions were easily removed by therapeutic aspiration and the airway was cleared SPECIMENS: No sample collected      Impression: Erythematous and friable airways Moderate amount of purulent secretions throughout RECOMMENDATION:  Continue with hypertonic saline nebs and as needed bronchoscopy for airway  clearance  I supervised and was present for the entire procedure Jarett Martins MD [facility]     Echo follow up/limited w/ contrast if indicated    Result Date: 3/17/2024  Narrative:   Left Ventricle: The left ventricular ejection fraction is 70%. Systolic function is vigorous. Global longitudinal strain is reduced at -14%. Wall motion is normal.   Aortic Valve: There is mild regurgitation.   Aorta: The aortic root is mildly dilated. The ascending aorta is mildly dilated. The aortic root is 4.10 cm. The ascending aorta is 4.1 cm.     XR chest portable ICU    Result Date: 3/14/2024  Narrative: XR CHEST PORTABLE ICU INDICATION: resp status change. COMPARISON: 3/12/2024 FINDINGS: Unchanged lines and tubes. Worsening consolidation throughout the right lung and left lower lobe consistent with multi lobar pneumonia, possibly aspiration. No pneumothorax or pleural effusion. Normal cardiomediastinal silhouette. Bones are unremarkable for age. Normal upper abdomen.     Impression: Worsening consolidation throughout the right lung and left lower lobe consistent with multi lobar pneumonia, possibly aspiration. The study was marked in EPIC for immediate notification. Workstation performed: [id number]     CT head wo contrast    Result Date: 3/13/2024  Narrative: CT BRAIN - WITHOUT CONTRAST INDICATION:   declining neuro exam. COMPARISON: CT dated 3/9/2024. TECHNIQUE:  CT examination of the brain was performed.  Multiplanar 2D reformatted images were created from the source data. Radiation dose length product (DLP) for this visit:  840.6 mGy-cm .  This examination, like all CT scans performed in the [facility], was performed utilizing techniques to minimize radiation dose exposure, including the use of iterative reconstruction and automated exposure control. IMAGE QUALITY:  Diagnostic. FINDINGS: PARENCHYMA: Stable postop changes status post left temporal lobectomy. No acute  infarct, hemorrhage, mass or mass effect. Stable mild nonspecific white matter changes likely due to chronic microangiopathy. Stable dense calcification in the bilateral basal ganglia and dentate nuclei. VENTRICLES AND EXTRA-AXIAL SPACES:  Normal for the patient's age. VISUALIZED ORBITS: Normal visualized orbits. PARANASAL SINUSES: Left maxillary sinus mucosal thickening. Stable small fluid levels in the sphenoid sinuses. Stable bilateral mastoid effusions, left greater than right. Stable left middle ear effusion.. CALVARIUM AND EXTRACRANIAL SOFT TISSUES: Left temporal craniotomy. NG tube is noted.     Impression: No acute intracranial pathology. Stable postoperative changes status post left temporal lobectomy. Chronic microangiopathy. Stable bilateral mastoid effusions, left greater than right and left middle ear effusion.. Stable sinus inflammatory changes. Workstation performed: [id number]     XR chest portable    Result Date: 3/13/2024  Narrative: XR CHEST PORTABLE INDICATION: post tracheostomy tube placement. COMPARISON: 3/12/2024 FINDINGS: Tracheostomy is in the typical location and seems satisfactory. Endogastric tube extends out of the field-of-view to the stomach. Left IJ central catheter tip projects at the caval atrial junction. Artifacts projecting over the chest including breathing apparatus and cardiac monitor leads. Patchy infiltrates throughout much of the right lung and infiltrate at the left base. No right-sided pneumothorax appreciated. Very difficult to assess the left apical region due to multiple artifacts. No large volume pneumothorax appreciated. No pleural  fluid seen. Unremarkable cardiomediastinal silhouette.  Atherosclerotic vascular calcifications noted. Bones are unremarkable for age. Normal upper abdomen.     Impression: Limited study. Tracheostomy appears satisfactory in position. Patchy pulmonary infiltrates throughout much of the right lung. Left basilar infiltrate. Otherwise, the  left lung appears improved in aeration from prior exam. Recommend continued follow-up Workstation performed: [id number]     XR chest portable ICU    Result Date: 3/12/2024  Narrative: XR CHEST PORTABLE ICU INDICATION: post intubation. COMPARISON: 3/11/2024 at 0 823 FINDINGS: The endotracheal tube terminates 3 cm above the tracee. The left IJ approach catheter terminates in the cavoatrial junction. The endogastric tube terminates below the diaphragm beyond the imaged limits. Redemonstrated are diffuse patchy airspace opacities. No pneumothorax or pleural effusion. Normal cardiomediastinal silhouette. Bones are unremarkable for age. Normal upper abdomen.     Impression: Tubes and lines in satisfactory position. Grossly unchanged diffuse parenchymal disease. Workstation performed: [id number]     XR chest portable ICU    Result Date: 3/12/2024  Narrative: XR CHEST PORTABLE ICU INDICATION: post intubation. COMPARISON: 3/11/2024 at 0 823 FINDINGS: The endotracheal tube terminates 3 cm above the tracee. The left IJ approach catheter terminates in the cavoatrial junction. The endogastric tube terminates below the diaphragm beyond the imaged limits. Redemonstrated are diffuse patchy airspace opacities. No pneumothorax or pleural effusion. Normal cardiomediastinal silhouette. Bones are unremarkable for age. Normal upper abdomen.     Impression: Tubes and lines in satisfactory position. Grossly unchanged diffuse parenchymal disease. Workstation performed: [id number]     Bronchoscopy    Result Date: 3/12/2024  Narrative: [facility] [address] [facility] [address] [phone] DATE OF SERVICE: 3/12/24 PHYSICIAN(S): Attending: Dr. Jarett Martins Fellow: Dr. Nigel Escobar, PGY IV INDICATION: Tracheostomy present (HCC), Pre- and Post- Tracheostomy placement POST-OP DIAGNOSIS: See the impression below. PREPROCEDURE: Standard airway preparation completed per respiratory therapy protocol.  Informed consent  was obtained. Images reviewed prior to the procedure.  A Time Out was performed. No suspicion or identified risk for TB or other airborne infectious disease; bronchoscopy procedure being performed for diagnostic purposes. PROCEDURE: Bronchoscopy DETAILS OF PROCEDURE: Patient was taken to the procedure room where a time out was performed to confirm correct patient and correct procedure. The patient was already under sedation prior to the procedure. The patient's blood pressure, ECG, heart rate, level of consciousness, oxygen and respirations were monitored throughout the procedure. The patient experienced no blood loss. The scope was introduced through the endotracheal tube. The procedure was not difficult. The patient tolerated the procedure well. There were no apparent adverse events. ANESTHESIA INFORMATION: ASA: ASA status cannot be found on the log. Anesthesia Type: Anesthesia type cannot be found on the log. FINDINGS: Moderate, thick, purulent and white secretions present in the left lower lobar bronchus, left superior segment (LB6), left anterior basal segment (LB7+8), left lateral basal segment (LB9), left posterior basal segment (LB10), bronchus intermedius, right lower lobar bronchus, right superior segment (RB6), right medial basal segment (RB7), right anterior basal segment (RB8), right lateral basal segment (RB9) and right posterior basal segment (RB10); secretions were easily removed and the airway was cleared The lower trachea, main tracee, left main stem, left upper lobar bronchus, left apical-posterior segment (LB1+2), left upper anterior segment (LB3), lingular lobar bronchus, superior lingular segment (LB4), inferior lingular segment (LB5), right main stem, right upper lobar bronchus, right apical segment (RB1), right posterior segment (RB2), right anterior segment (RB3), right middle lobar bronchus, right lateral segment (RB4) and right medial segment (RB5) appeared normal. SPECIMENS: No specimens  collected for this procedure      Impression: Excessive secretions again seen from lower lobes bilaterally, pre-tracheostomy. Post-Tracheostomy bronchoscopy showing minimal procedural bleeding, and without significant secretions. I was present and supervised the entire procedure Jarett Martins MD [facility]      bedside procedure    Result Date: 3/12/2024  Narrative: [id number]    US bedside procedure    Result Date: 3/12/2024  Narrative: [id number]    Bronchoscopy    Result Date: 3/11/2024  Narrative: Table formatting from the original result was not included. [facility] [address] [facility] [id number] [phone] DATE OF SERVICE: 3/11/24 PHYSICIAN(S): Attending: Dr. Jarett Martins Fellow: Dr. Nigel Escobar, PGY IV INDICATION: Acute respiratory failure with hypoxia (HCC) POST-OP DIAGNOSIS: See the impression below. PREPROCEDURE: Standard airway preparation completed per respiratory therapy protocol.  Informed consent was obtained. Images reviewed prior to the procedure.  A Time Out was performed. No suspicion or identified risk for TB or other airborne infectious disease; bronchoscopy procedure being performed for diagnostic purposes. PROCEDURE: Bronchoscopy DETAILS OF PROCEDURE: Patient was taken to the procedure room where a time out was performed to confirm correct patient and correct procedure. The patient was already under sedation prior to the procedure. The patient's blood pressure, ECG, heart rate, oxygen, level of consciousness and respirations were monitored throughout the procedure. The patient experienced no blood loss. The scope was introduced through the endotracheal tube. The procedure was not difficult. The patient tolerated the procedure well. There were no apparent adverse events. ANESTHESIA INFORMATION: ASA: ASA status cannot be found on the log. Anesthesia Type:  Anesthesia type cannot be found on the log. FINDINGS: Excessive, thick, purulent and white secretions present in the left lower lobar bronchus, left superior segment (LB6), left anterior basal segment (LB7+8), left lateral basal segment (LB9), left posterior basal segment (LB10), bronchus intermedius, right lower lobar bronchus, right superior segment (RB6), right medial basal segment (RB7), right anterior basal segment (RB8), right lateral basal segment (RB9) and right posterior basal segment (RB10); secretions were easily removed and the airway was cleared; performed washing, sent sample for microbiology analysis The left main stem, left upper lobar bronchus, left apical-posterior segment (LB1+2), left upper anterior segment (LB3), lingular lobar bronchus, superior lingular segment (LB4), inferior lingular segment (LB5), right main stem, right upper lobar bronchus, right apical segment (RB1), right posterior segment (RB2) and right anterior segment (RB3) appeared normal. SPECIMENS: Collected for cultures, cell count, and cytology      Impression: Thick, purulent secretions mainly in the b/l lower lobe segments. RUL, RML, and MANDY were normal. RECOMMENDATION:  F/u gram stain and culture, viral culture, bacterial culture, PCP, fungal culture, COVID  I supervised and was present for the entire procedure Jarett Martins MD [facility]     XR chest portable    Result Date: 3/11/2024  Narrative: XR CHEST PORTABLE INDICATION: tachypnea. COMPARISON: CXR and chest CT 3/10/2024. FINDINGS: NG tube in stomach. Left ocular catheter at cavoatrial junction. Persistent extensive bilateral groundglass opacity. No pneumothorax or pleural effusion. Normal cardiomediastinal silhouette. Bones are unremarkable for age. Normal upper abdomen.     Impression: Persistent extensive bilateral groundglass opacity. Workstation performed: [id number]     XR abdomen 1 view kub    Result Date:  3/11/2024  Narrative: XR ABDOMEN 1 VIEW KUB INDICATION: abdominal distension. COMPARISON: None FINDINGS: Enteric catheter courses into the distal stomach. Colostomy in the right lower quadrant Nonobstructive bowel gas pattern. No evidence of pneumoperitoneum on this supine study. Upright or left lateral decubitus imaging is more sensitive to detect subtle free air in the appropriate setting. Bilateral renal calcifications better assessed on CT. Pulmonary opacities better assessed on dedicated chest imaging. Bones are unremarkable for the patient's age.     Impression: Nonobstructive bowel gas pattern. Workstation performed: [id number]     CT chest abdomen pelvis wo contrast    Result Date: 3/10/2024  Narrative: CT CHEST, ABDOMEN AND PELVIS WITHOUT IV CONTRAST INDICATION: concern for infection. COMPARISON: 3/6/2024. TECHNIQUE: CT examination of the chest, abdomen and pelvis was performed without intravenous contrast. Multiplanar 2D reformatted images were created from the source data. This examination, like all CT scans performed [facility] the [facility], was performed utilizing techniques to minimize radiation dose exposure, including the use of iterative reconstruction and automated exposure control. Radiation dose length product (DLP) for this visit: 549.64 mGy-cm Enteric Contrast: Not administered. FINDINGS: CHEST LUNGS: Extensive groundglass and nodular consolidation throughout both lung fields with more focal peripheral opacification particularly at the lung bases. Extensive regions of bronchiectasis most notable at the lung bases. PLEURA: Unremarkable. HEART/GREAT VESSELS: Heart is unremarkable for patient's age. No thoracic aortic aneurysm with top normal size of the ascending thoracic aorta measuring 39 mm at the level of the left main pulmonary artery. MEDIASTINUM AND KIRBY: Unremarkable. CHEST WALL AND LOWER NECK: Left central line terminates at the caval atrial junction. ABDOMEN LIVER/BILIARY  TREE: Unremarkable. GALLBLADDER: Vicarious excretion. SPLEEN: Unremarkable. PANCREAS: Unremarkable. ADRENAL GLANDS: Unremarkable. 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 Mild right renal fullness as before without distal obstructive pathology. STOMACH AND BOWEL: Nasogastric tube terminates at the gastroduodenal junction. Right lower quadrant ileostomy with a patent stoma after ileocolectomy. Diverticular disease also noted without diverticulitis. Edema and small foci of subcutaneous emphysema after recent intervention. No drainable collection. APPENDIX: No findings to suggest appendicitis. ABDOMINOPELVIC CAVITY: No ascites. No pneumoperitoneum. No lymphadenopathy. VESSELS: Unremarkable for patient's age. PELVIS REPRODUCTIVE ORGANS: Unremarkable for patient's age. URINARY BLADDER: Decompressed with a Ortiz catheter in place. ABDOMINAL WALL/INGUINAL REGIONS: Unremarkable. BONES: No acute fracture or suspicious osseous lesion.     Impression: 1. Persistent bilateral groundglass and nodular consolidation with peripheral opacification at the right greater than left lung bases. Findings remain concerning for multi lobar infiltrates with possible superimposed COVID-19 opacities. 2. Status post ileocolectomy with a right lower quadrant ileostomy again exhibiting a patent stoma. Persistent inflammatory change and subcutaneous emphysema after recent intervention. No drainable collection. 3. Bilateral renal calcifications again suggestive of medullary sponge kidney with persistent mild right renal fullness but no evidence of distal obstructive pathology. Workstation performed: [id number]     XR chest portable    Result Date: 3/10/2024  Narrative: XR CHEST PORTABLE INDICATION: sob. COMPARISON: CXR 3/9/2024 and 3/7/2024, chest CT 3/6/2024. FINDINGS: NG tube in stomach. Left upper catheter at cavoatrial junction. Persistent extensive bilateral  groundglass opacity. No pneumothorax or pleural effusion. Normal cardiomediastinal silhouette. Bones are unremarkable for age. Normal upper abdomen.     Impression: Persistent extensive bilateral groundglass opacity. Workstation performed: [id number]     CT head wo contrast    Result Date: 3/9/2024  Narrative: CT BRAIN - WITHOUT CONTRAST INDICATION:   Stroke Alert. COMPARISON: CT head from 3/6/2024 and priors, MRI of the brain from 1/10/2024 TECHNIQUE:  CT examination of the brain was performed.  Multiplanar 2D reformatted images were created from the source data. Radiation dose length product (DLP) for this visit:  859.76 mGy-cm .  This examination, like all CT scans performed in the [facility], was performed utilizing techniques to minimize radiation dose exposure, including the use of iterative  reconstruction and automated exposure control. IMAGE QUALITY:  Diagnostic. FINDINGS: PARENCHYMA: Stable postoperative changes from left temporal lobectomy. Decreased attenuation is noted in periventricular and subcortical white matter demonstrating an appearance that is statistically most likely to represent mild microangiopathic change. Prominent calcifications within bilateral basal ganglia and dentate nuclei. No CT signs of acute vascular territory infarction.  No intracranial mass, mass effect or midline shift.  No acute parenchymal hemorrhage. VENTRICLES AND EXTRA-AXIAL SPACES:  Normal for the patient's age. VISUALIZED ORBITS: Normal visualized orbits. PARANASAL SINUSES: Mild polypoid mucosal thickening of the left maxillary sinus. Layering fluid within bilateral sphenoid sinuses CALVARIUM AND EXTRACRANIAL SOFT TISSUES: No acute osseous abnormalities. Sequela of left temporal craniotomy. Large left and moderate right mastoid effusions, unchanged. Left middle ear effusion.     Impression: -No acute intracranial abnormality. Stable microangiopathic changes. -Stable postoperative changes related to left  temporal lobectomy. -Redemonstrated air-fluid levels within bilateral sphenoid sinuses, left greater than right mastoid effusions and left middle ear effusion. Clinical correlation advised. I personally communicated the findings via telephone with Ravi Christiansonjackie at 3:06 p.m. on 3/9/2024. Workstation performed: [id number]     XR chest portable    Result Date: 3/9/2024  Narrative: XR CHEST PORTABLE INDICATION: tachypnea, hypoxia. COMPARISON: Chest radiograph 3/7/2024; CT chest abdomen pelvis 3/6/2024 FINDINGS: Left-sided central line with tip at the cavoatrial junction. Enteric tube tip below the level of the diaphragm. No significant interval change in multifocal patchy groundglass opacities, left side greater than right. No pneumothorax or pleural effusion. Normal cardiomediastinal silhouette. Bones are unremarkable for age. Normal upper abdomen.     Impression: No significant interval change in multifocal patchy groundglass opacities, left side greater than right. Findings are likely related to patient's COVID-19 pneumonia. Resident: SANDIP Nieto I, the attending radiologist, have reviewed the images and agree with the final report above. Workstation performed: [id number]     XR chest portable ICU    Result Date: 3/7/2024  Narrative: XR CHEST PORTABLE ICU INDICATION: sob. Patient has confirmed COVID-19. COMPARISON: Portable chest from March 5, 2024. CT of the chest, abdomen and pelvis from March 6, 2024. FINDINGS: NG tube extends into the stomach. Tip of left-sided IJ catheter in superior vena cava. Patchy opacification throughout the right lung, improved since 3/5/2024. Persistent opacification in portions of the left lower lobe. No pneumothorax or pleural effusion. Normal cardiomediastinal silhouette. Bones are unremarkable for age. Normal upper abdomen.     Impression: Diffuse patchy opacification in the right lung, either pneumonia or asymmetric pulmonary edema, improved in appearance since 3/5/2024. Persistent  atelectasis and/or consolidation in the base of the left lower lobe. Workstation performed: [id number]     CT chest abdomen pelvis wo contrast    Result Date: 3/6/2024  Narrative: CT CHEST, ABDOMEN AND PELVIS WITHOUT IV CONTRAST INDICATION: worsening SOB, abd pain. COVID-positive. COMPARISON: CT chest abdomen pelvis 3/4/2024. CT abdomen pelvis 10/21/2020. CT chest abdomen pelvis 2/20/2024. TECHNIQUE: CT examination of the chest, abdomen and pelvis was performed without intravenous contrast. Multiplanar 2D reformatted images were created from the source data. This examination, like all CT scans performed in the [facility], was performed utilizing techniques to minimize radiation dose exposure, including the use of iterative reconstruction and automated exposure control. Radiation dose length product (DLP) for this visit: 1086.03 mGy-cm Enteric Contrast: Not administered. FINDINGS: CHEST LUNGS: Evaluation partially limited by motion artifact. Increased extent of groundglass opacities and patchy consolidations throughout the lungs, particularly increased in the upper lobes compared to prior CT 3/4/2024. Lobular consolidation in the posterior lung bases is not significantly changed since most recent exam and is suspicious for atelectasis and bacterial pneumonia. Right middle lobe patchy opacity appears unchanged from most recent exam. PLEURA: Unremarkable. HEART/GREAT VESSELS: Mild atherosclerotic coronary artery calcifications. Unchanged fusiform ectasia of the ascending thoracic aorta measuring up to 40 mm (series 303 image 59). Recommendation is for follow-up low radiation dose chest CT in one year. Left IJ CVC terminating in the distal SVC. MEDIASTINUM AND KIRBY: Unremarkable. CHEST WALL AND LOWER NECK: Prior left hemithyroidectomy. ABDOMEN LIVER/BILIARY TREE: Unremarkable. GALLBLADDER: Redemonstrated layering high density in the dependent lumen of the gallbladder. Likely vicarious excretion of  injected IV contrast versus layering sludge. No gallbladder wall thickening. No pericholecystic fluid. SPLEEN: Subcentimeter splenic hypodensity (303 image 121) seen dating back to December 2023, too small to characterize but statistically likely benign. PANCREAS: Unremarkable. ADRENAL GLANDS: Unremarkable. KIDNEYS/URETERS: Bilateral renal cysts. Multiple bilateral renal calculi/calcifications again noted, again possibly due to medullary sponge kidney. Mild right pelvocaliectasis and ureterectasis. There is also fullness of the left renal pelvis. No obstructing calculi. STOMACH AND BOWEL: Enteric tube with tip terminating at the distal stomach. Postsurgical changes of ileocolectomy with end ileostomy. No bowel obstruction. Colonic diverticulosis without findings of acute diverticulitis. APPENDIX: Surgically absent. ABDOMINOPELVIC CAVITY: No ascites. No pneumoperitoneum. No lymphadenopathy. VESSELS: Atherosclerosis without abdominal aortic aneurysm. PELVIS REPRODUCTIVE ORGANS: Unremarkable for patient's age. URINARY BLADDER: Ortiz catheter within the bladder. Moderately increasing gas in the urinary bladder, likely secondary to Ortiz placement. ABDOMINAL WALL/INGUINAL REGIONS: Postsurgical changes of the anterior abdominal wall. Redemonstrated edema and subcutaneous gas within the anterior abdominal wall fat surrounding the right lower quadrant ostomy site, amount of gas slightly decreased from  prior. No discrete fluid collection. Soft tissue densities in the anterior abdominal wall may be related to subcutaneous injections. BONES: No acute fracture or suspicious osseous lesion.     Impression: 1. Increased extent of groundglass opacities and patchy consolidations throughout the lungs. Focal consolidations in the posterior lung bases and right middle lobe are not significantly changed compared to March 4, 2024. Again, findings are likely due to  a combination of COVID-19 and bacterial pneumonia. 2. Redemonstrated  edema and subcutaneous gas within the anterior abdominal wall fat surrounding the right lower quadrant ostomy site, amount of gas slightly decreased from prior. 4. Mild right pelvocaliectasis and ureterectasis. No obstructing calculi. 5. Fusiform ectasia of the ascending thoracic aorta measuring 40 mm. Recommend follow-up low radiation dose chest CT in 1 year. The study was marked in EPIC for immediate notification. Resident: LOUISE LEUNG I, the attending radiologist, have reviewed the images and agree with the final report above. Workstation performed: [id number]     CT head wo contrast    Result Date: 3/6/2024  Narrative: CT BRAIN - WITHOUT CONTRAST INDICATION:   ams. COMPARISON: MRI brain 1/10/2024 TECHNIQUE:  CT examination of the brain was performed.  Multiplanar 2D reformatted images were created from the source data. Radiation dose length product (DLP) for this visit:  791.12 mGy-cm .  This examination, like all CT scans performed in the  Network, was performed utilizing techniques to minimize radiation dose exposure, including the use of iterative  reconstruction and automated exposure control. IMAGE QUALITY:  Diagnostic. FINDINGS: PARENCHYMA: Stable postoperative changes related to left temporal lobectomy. Decreased attenuation is noted in periventricular and subcortical white matter demonstrating an appearance that is statistically most likely to represent mild microangiopathic change. Prominent bilateral basal ganglia and dentate nuclei calcifications. No CT signs of acute infarction.  No intracranial mass, mass effect or midline shift.  No acute parenchymal hemorrhage. VENTRICLES AND EXTRA-AXIAL SPACES:  Ventricles and extra-axial CSF spaces are prominent commensurate with the degree of volume loss.  No hydrocephalus.  No acute extra-axial hemorrhage. VISUALIZED ORBITS: Normal visualized orbits. PARANASAL SINUSES: Mild right maxillary sinus mucosal thickening with superimposed  retention cysts. New air-fluid levels within the left maxillary and bilateral sphenoid sinuses. CALVARIUM AND EXTRACRANIAL SOFT TISSUES: Left temporal craniotomy. Opacification of the bilateral mastoid air cells, new since 1/8/2024     Impression: No acute intracranial abnormality. Stable postoperative changes related to left temporal lobectomy. Mild chronic microangiopathic ischemic changes. New air-fluid levels within the left maxillary and bilateral sphenoid sinuses suggestive of acute on chronic sinusitis. New bilateral mastoid fluid. Workstation performed: [id number]     XR chest portable ICU    Result Date: 3/5/2024  Narrative: XR CHEST PORTABLE ICU INDICATION: confirm for ng tube. COMPARISON: Chest radiograph performed earlier the same day. FINDINGS: Interval placement of enteric tube which courses below the level of the diaphragm and tip is beneath the field-of-view. Sidehole projects over the stomach. Left central catheter in the lower SVC as before. Patchy opacities are noted in both lungs, right greater than left. No pneumothorax or pleural effusion. Normal cardiomediastinal silhouette. Bones are unremarkable for age. Normal upper abdomen.     Impression: Enteric tube placement as above. Bilateral patchy opacities, right greater than left. Workstation performed: [id number]     XR chest portable ICU    Result Date: 3/5/2024  Narrative: XR CHEST PORTABLE ICU INDICATION: Hypoxia. COVID-positive 2/21/2024. COMPARISON: CXR and chest CT 3/4/2024. FINDINGS: Left central catheter in lower SVC. Persistent extensive bilateral groundglass opacity. No pneumothorax or pleural effusion. Normal cardiomediastinal silhouette. Bones are unremarkable for age. Normal upper abdomen.     Impression: Persistent extensive bilateral groundglass opacity. Workstation performed: [id number]     CT chest abdomen pelvis w contrast    Result Date: 3/4/2024  Narrative: CT CHEST, ABDOMEN AND PELVIS WITH IV CONTRAST INDICATION: Abdominal  pain. Severe COVID at time of admission. Recent bacterial pneumonia. COMPARISON: February 26, 2024 TECHNIQUE: CT examination of the chest, abdomen and pelvis was performed. Multiplanar 2D reformatted images were created from the source data. This examination, like all CT scans performed in the [facility], was performed utilizing techniques to minimize radiation dose exposure, including the use of iterative reconstruction and automated exposure control. Radiation dose length product (DLP) for this visit: 603.66 mGy-cm IV Contrast: 100 mL of iohexol (OMNIPAQUE) Enteric Contrast: Not administered. FINDINGS: CHEST LUNGS: Combination of groundglass and consolidative airspace opacities are seen throughout the lungs with relative sparing of the anterior pulmonary parenchyma. Groundglass density, predominant in the central mid and upper lung zones, is now developing what appears to be more fibrotic appearance including some traction bronchiolectasis. This finding appears to represent evolving sequela of pneumonitis related to  viral pneumonia (COVID). Alveolar consolidation in the posterior lung bases featuring associated volume loss and enhancement is improved as compared with previous examination suspicious for a combination of bacterial pneumonia and atelectasis. New dense patchy opacity in the lateral aspect of the right middle lobe with both groundglass and alveolar consolidative properties on image 58 of series 301 suspicious for worsening bacterial pneumonia in that location. PLEURA: Unremarkable. HEART/GREAT VESSELS: Left internal jugular central venous catheter, tip at cavoatrial junction. Heart is unremarkable for patient's age. Borderline fusiform ectasia of ascending thoracic aorta up to 39 mm. MEDIASTINUM AND KIRBY: No mediastinal or hilar lymphadenopathy. Enteric tube in the esophagus extending into the stomach. CHEST WALL AND LOWER NECK: Unremarkable. ABDOMEN LIVER/BILIARY TREE: Unremarkable.  GALLBLADDER: Layering high density in the dependent lumen of the gallbladder fundus, probably vicarious excretion of injected intravenous contrast, though alternatively this could represent layering sludge. No gallbladder wall thickening or pericholecystic inflammatory edema. SPLEEN: Unremarkable. PANCREAS: Unremarkable. ADRENAL GLANDS: Unremarkable. KIDNEYS/URETERS: Lobulated renal contours related to scarring. Bilateral renal cysts. No solid renal mass, urinary tract calculus, or hydronephrosis. STOMACH AND BOWEL: Enteric tube tip within the stomach. No bowel thickening or bowel obstruction. Surgical changes of segmental bowel resection with right mid abdominal ostomy. Subcutaneous gas and cellulitic changes noted surrounding the ostomy site suggesting infection. No drainable abscess collection. No bowel obstruction. APPENDIX: Surgically removed. ABDOMINOPELVIC CAVITY: Small volume of free pelvic ascites with some layering high density in its dependent portion. No abscess. No pneumoperitoneum. No lymphadenopathy. VESSELS: Unremarkable for patient's age. PELVIS REPRODUCTIVE ORGANS: Unremarkable for patient's age. URINARY BLADDER: Bubble of gas within the urinary bladder presumably related to recent instrumentation. No bladder wall mass. ABDOMINAL WALL/INGUINAL REGIONS: As mentioned above, cellulitic edema and subcutaneous gas within the body wall fat surrounding the ostomy site. BONES: No acute fracture or suspicious osseous lesion.     Impression: Evolving changes in the lungs which represent some combination of evolving pneumonitis secondary to viral COVID infection, improving atelectasis/bacterial infection in the posterior lower lung zones, and developing focal bacterial type pneumonia in the lateral aspect of the right middle lobe. Cellulitic edema and subcutaneous gas in the fatty soft tissue surrounding the ostomy site in the right mid abdomen. This probably represents infectious cellulitis and air tracking  "backward from the ostomy site. No drainable abscess seen. Small volume of complex ascites again noted in the dependent hemipelvis. This examination was marked \"immediate notification\" in Epic in order to begin the standard process by which the radiology reading room liaison alerts the referring practitioner. Workstation performed: [id number]     XR chest portable ICU    Result Date: 3/4/2024  Narrative: XR CHEST PORTABLE ICU INDICATION: sob. COMPARISON: 2/28/2024 FINDINGS: Endogastric tube extends to the left upper quadrant of the abdomen. Artifacts project over the chest and upper abdomen. A left IJ central venous catheter has the tip projecting in the right atrial region. Patchy bilateral pulmonary infiltrates are present mostly in the mid to lower lung fields. This may represent bilateral pneumonia or perhaps patchy pulmonary edema. Correlate with clinical scenario. There may be minimal left pleural effusion. No right-sided effusion. No visible pneumothorax. Unremarkable cardiomediastinal silhouette.  Atherosclerotic vascular calcifications noted. Bones are unremarkable for age. Normal upper abdomen.     Impression: Line and tube appear satisfactory. Patchy bilateral pulmonary infiltrates which may be pneumonia or pulmonary edema. Possible small left pleural effusion Workstation performed: [id number]      bedside procedure    Result Date: 2/29/2024  Narrative: [id number]    XR shoulder 2+ vw left    Result Date: 2/29/2024  Narrative: XR SHOULDER 2+ VW LEFT INDICATION: shoulder placement. COMPARISON: None FINDINGS: No acute fracture or dislocation. No significant degenerative changes. No lytic or blastic osseous lesion. Unremarkable soft tissues.     Impression: No acute osseous abnormality. Workstation performed: [id number]     XR chest portable ICU    Result Date: 2/28/2024  Narrative: XR CHEST PORTABLE ICU INDICATION: pneumonitis. COMPARISON: 2/25/2024 FINDINGS: The tip of the " endotracheal tube is 4.7 cm above the tracee. An endogastric tube extends below the diaphragm with the sideport probably in the gastric cardia region. Esophageal temperature probe tip terminates at about the mid esophageal level.  Left-sided IJ central venous catheter tip projects near the caval atrial junction. Relatively diffuse mixed interstitial and alveolar opacity in the left lung and more of a reticulonodular infiltrate involving predominantly the mid to lower right lung. Asymmetric edema or pneumonia to be considered. No gross evidence of significant pleural fluid. No pneumothorax seen. No mediastinal shift. Normal cardiomediastinal silhouette. Bones are unremarkable for age. Normal upper abdomen.     Impression: Lines and tubes appear satisfactory. Bilateral left greater than right infiltrates which could be asymmetric pulmonary edema or pneumonia. Correlate with clinical scenario Workstation performed: [id number]     VAS upper limb venous duplex scan, complete, bilateral    Result Date: 2/26/2024  Narrative:  [facility] REPORT [facility]INICAL: Indications: Patient presents to evaluate for DVT. Risk Factors The patient has history of CKD.  FINDINGS:  Segment                           Right           Left                                                                Impression      Impression      Int. Jugular                                      Not Visualized  Innominate Vein                                   Not Visualized  Subclavian                                        Not Visualized  Cephalic Upper Arm Distal                         Acute           Cephalic Vein - Forearm Proximal  Not visualized                  Cephalic Vein - Forearm Distal    Acute                              CONCLUSION:  Impression  RIGHT UPPER LIMB: No gross evidence of acute or chronic deep vein thrombosis. Evidence of superficial thrombophlebitis was noted in the cephalic vein at the distal forearm. Doppler evaluation  shows a normal response to augmentation maneuvers.  LEFT UPPER LIMB: No gross evidence of acute or chronic deep vein thrombosis. Evidence of superficial thrombophlebitis was noted in the cephalic vein at the distal upperarm. Doppler evaluation shows a normal response to augmentation maneuvers.  Technical findings were given to Dl Pillai at 6:56 PM. Technically limited study.  SIGNATURE: Electronically Signed by: JANA YEH MD on 2024-02-26 11:01:34 PM    XR abdomen 1 view kub    Result Date: 2/26/2024  Narrative: XR ABDOMEN 1 VIEW KUB INDICATION: distnsion. COMPARISON: None FINDINGS: Distal aspect of NG tube is coursing below the left hemidiaphragm. Nonobstructive bowel gas pattern. No evidence of pneumoperitoneum on this supine study. Upright or left lateral decubitus imaging is more sensitive to detect subtle free air in the appropriate setting. No pathologic calcification or soft tissue mass.     Impression: No acute findings. Workstation performed: [id number]     XR chest portable ICU    Result Date: 2/26/2024  Narrative: XR CHEST PORTABLE ICU INDICATION: low o2. COMPARISON: 2/25/2024 FINDINGS: ET tube tip is 5.5 cm above the tracee. Distal aspect of NG tube is coursing below the left hemidiaphragm. Left sided central venous catheter tip terminates near cavoatrial junction. Esophageal temperature probe tip terminates over the middle third of the esophagus, consider advancement to the distal third of the esophagus. Similar appearance of diffuse interstitial and hazy/groundglass opacity. No pneumothorax or pleural effusion. Unchanged cardiomediastinal silhouette. No acute osseous abnormality within the limitations of portable radiography. Normal upper abdomen.     Impression: Similar appearance of diffuse interstitial and hazy/groundglass opacity. Esophageal temperature probe tip terminates over the middle third of the esophagus, consider advancement to the distal third of the esophagus. Workstation  performed: [id number]     CT pe study w abdomen pelvis w contrast    Result Date: 2/26/2024  Narrative: CT PULMONARY ANGIOGRAM OF THE CHEST AND CT ABDOMEN AND PELVIS WITH INTRAVENOUS CONTRAST INDICATION: acute decompensation, concern for PE vs abd pathology. COMPARISON: CT of the chest abdomen pelvis on February 24, 2024. TECHNIQUE: CT examination of the chest, abdomen and pelvis was performed. Thin section CT angiographic technique was used in the chest in order to evaluate for pulmonary embolus and coronal 3D MIP postprocessing was performed on the acquisition scanner. Multiplanar 2D reformatted images were created from the source data. This examination, like all CT scans performed in the [facility], was performed utilizing techniques to minimize radiation dose exposure, including the use of iterative reconstruction and automated exposure control. Radiation dose length product (DLP) for this visit: 1224.96 mGy-cm IV Contrast: 85 mL of iohexol (OMNIPAQUE) Enteric Contrast: Not administered. FINDINGS: CHEST PULMONARY ARTERIAL TREE: No pulmonary embolus is seen. LUNGS: Dense patchy opacities in the bilateral lower lobes and groundglass opacities within the upper lung fields similar to prior examination. Endotracheal tube in place 2.6 cm above the tracee PLEURA: No pneumothorax or pleural effusion. HEART/AORTA: Left-sided PICC with its tip in the right atrium. Heart is unremarkable for patient's age. Fusiform ectasia of the ascending thoracic aorta measuring up to 40 mm. Recommendation is for follow-up low radiation dose chest CT in one year. MEDIASTINUM AND KIRBY: Unremarkable. CHEST WALL AND LOWER NECK: Hypoplastic or absent left thyroid gland. ABDOMEN LIVER/BILIARY TREE: Unremarkable. GALLBLADDER: High density material within the gallbladder likely due to vicarious excretion of contrast. No pericholecystic inflammatory change. SPLEEN: Unremarkable. PANCREAS: Unremarkable. ADRENAL GLANDS:  Unremarkable. KIDNEYS/URETERS: Bilateral renal cysts and hypodensities too small to characterize. Bilateral renal calcifications/calculi are again seen. No hydronephrosis. STOMACH AND BOWEL: Enteric tube with its tip in the stomach. No bowel obstruction. Status post partial right colon resection. Right upper quadrant colostomy. There is mild diffuse thickening of the colon which may be due to under distention versus colitis. APPENDIX: Absent. ABDOMINOPELVIC CAVITY: Small amount of ascites. No pneumoperitoneum. No lymphadenopathy. VESSELS: Mild atherosclerotic calcifications. Flattening of the IVC similar to prior exam. PELVIS REPRODUCTIVE ORGANS: Unremarkable for patient's age. URINARY BLADDER: Ortiz catheter within the bladder. ABDOMINAL WALL/INGUINAL REGIONS: Postsurgical changes of the ventral abdominal wall. Midline skin staples are seen. BONES: No acute fracture or suspicious osseous lesion.     Impression: 1.  No evidence of pulmonary embolism. 2.  Dense patchy opacities in the bilateral lower lobes and groundglass opacities within the upper lung fields similar to prior examination likely due to pneumonia. 3.  Mild thickening of the remaining colon which may be due to under distention versus nonspecific colitis. 4.  Small amount of ascites similar to prior examination. Workstation performed: [id number]     XR chest portable ICU    Result Date: 2/25/2024  Narrative: XR CHEST PORTABLE ICU INDICATION: Acute hypoxic respiratory failure. COMPARISON: Chest CT 2/24/2024, CXR 2/23/2024. FINDINGS: ET tube 6 cm above the tracee. NG tube below the diaphragm. Temperature probe in the midesophagus. Left jugular catheter at cavoatrial junction. Persistent extensive bilateral consolidation and groundglass opacity. No pneumothorax or pleural effusion. Normal cardiomediastinal silhouette. Bones are unremarkable for age. Normal upper abdomen.     Impression: Persistent extensive bilateral consolidation and groundglass opacity.  Workstation performed: [id number]       Labs and pertinent reports reviewed.      This note has been generated by voice recognition software system.  Therefore, there may be spelling, grammar, and or syntax errors. Please contact if questions arise.

## 2024-03-25 NOTE — PROGRESS NOTES
Critical Care Attending Note; Bharat Márquez   Note Date: 24  Note Time: 11:10 AM    Patient: Carla Hunt  Age, : 59 y.o., 1965 MRN: [id number] Code Status: Level 1 - Full Code Patient Location: MICU 12/MICU 12   Hospital LOS:75 days  ]   Patient seen and examined, medical record reviewed, discussed with house staff and nursing staff.     HPI   CC: Respiratory Failure   59F with a PMH of B - Cell Lymphoma(Bendamustine-Rituximab x 6 cycles [21 - 1/3/22], maintenance Rituximab), Epilepsy(Temporal Lobe resection- Complex Migraines) originally admitted for AMS found to have COVID, she has had a very complicated course requiring multiple intubations, volvulus(Ex Lap - ileostomy - wound dehiscence), hemorrhagic shock,  PNAs.    Key Recent Events   : [facility] - AMS, COVID - Steroid protocol  1/10: Transfer [facility] Video EEG  : LP   1/15: LP  : Bronch  : Pulse Dose steroids - 3 days   Intubated - Epistaxis  2/15: IVIG Completed  : Bronch - COVID + Antigen/PCR  : Volvulus - ex lap, ileocecectomy, end ileostomy and mucus fistula creation   :  Bronch   : Bronch  3/1: Extubated  3/11: Re-intubated, Bronch - Stenotrophomonas/Pseudo  3/12: Bronch  3/12: Trach  3/13: BAL - Stenotrophomonas   3/15: 14d course Paxlovid/remdesivir  3/17: Bronch  3/20: Hemorrhagic shock - Ostomy - 2PRBCs - OR -   3/21: Hemorrhage Ostomy/WV- 1 PRBCs - OR -  3/25: No acute events overnight      Main ICU Plans:       #Neuro  Anxiety/Alertness  - Modafinil -worsening anxiety, trial off  - Venlanfaxine    Seizure disorder  - Lamictal, Topimax    #Pulm  Hypoxic Respiratory Failure - COVID, Pneumonitis, Rituximab - Improved but failed weaning steroids. Extended steroid course and has been on steroids for > 1 month. Plan for slower wean from steroids to monitor for improvement and    - TCT daily  - Wean  Solumedro 30 q12hr - hold for 1 week   - Completed 14 day Paxlovid/remdesivir  3/15      #Renal  Hypernatremia   - Increase FWF    #GI  Volvulus -   - General surgery following  - Ostomy/Wound Care    Severe Protein Calorie malnutrition - Patient unlikely to maintain enough oral intake to keep up with calorie demand  - Recommend PEG once stable  - Swallow eval   - Aggressive PT/OT      #ID   Stenotrophomonas PNA  - ID Consulted - Minocycline    COVID PNA - Extended steroid/antiviral course requiring pulse dose steroids with improvement in respiratory function. Treatment difficult due to underlying immunosuppression  - Steroids as above  - Repeat covid test      #MSK  Steroid/Critical Care Neuropathy  - PT/OT       #Heme  Anemia - concerns for PUD, maybe due to BM suppression  - GI Consulted - holding off on EGD     Thrombocytopenia - Likely consumptive/BM suppression - possibly due to anti-epileptics   - Follow MAGDIEL workup   - Heme consulted    #Endo  - Insulin gtt    #DVT/GI ppx  Hold SQH     #Lines/Tubes/Drains:   Invasive Devices       Peripheral Intravenous Line  Duration             Long-Dwell Peripheral IV (Midline) 03/08/24 Left Cephalic Vein 16 days    Peripheral IV 03/20/24 Right Antecubital 5 days              Drain  Duration             Ileostomy RUQ 33 days    NG/OG/Enteral Tube Nasogastric 14 Fr Right nare 20 days    Urethral Catheter 1 day              Airway  Duration             Surgical Airway Shiley Cuffed 12 days                    #Nutrition:   Diet Enteral/Parenteral; Tube Feeding No Oral Diet; Vital 1.5; Continuous; 45; Prosource Protein Liquid - One Packet; OD; 350; Water; Every 4 hours        #Code Status:   Level 1 - Full Code    #Dispo:   ICU        Bharat Márquez MD  Pulmonary, Critical Care    Critical care time, excluding procedures, teaching, family meetings, and excludes any prior time recorded by the AP/resident, 35 minutes. Upon my evaluation, this patient has a high probability of imminent or life-threatening deterioration due to above problems which  required my direct attention, intervention, and personal management.   Impression/Active Problems:    Acute hypoxic respiratory failure ventilator dependent- s/p tracheostomy  Bilateral ground glass opacities- likely persistent PNA and superimposed bacterial infection  Persistent COVID Pna with superimposed bacterial pna- w/ stenotrophomonas  Acute blood loss anemia- from stoma site  Hemorrhagic shock  Severe encephalopathy- possibly due to uremia  Cutaneous ulcer- below the trach site, likely due to pressure.   Stenotrophomonas PNA  Sepsis- 2/2 persistent pneumonia  Volvulus- post hemicolectomy s/p cecal ostomy. Noted stomal retraction  Shock- unclear etiology  Thrombocytopenia  Bcell lymphoma- on rituximab  Steroid induced hyperglycemia  Minimal SAH POA  Seizure disorder  Upper extremity thrombophlebitis  Severe lymphopenia- zero lymphs on diff  Hx of migraines- s/p left temporal lobectomy  Hypogammaglobulinemia  Critical illness myopathy      Physical Exam:     Vital Signs:   Weight: 79 kg (174 lb 2.6 oz)  IBW: Ideal body weight: 63.9 kg (140 lb 14 oz)  Adjusted ideal body weight: 69.9 kg (154 lb 3 oz)  Temp:  [98.4 °F (36.9 °C)-99.8 °F (37.7 °C)] 98.4 °F (36.9 °C)  HR:  [119-146] 121  Resp:  [15-29] 20  BP: (101-157)/(60-78) 157/75  FiO2 (%):  [35] 35  General: NAD  Neuro: Alert, following commands  Heart: RRR  Lungs: Basilar crackles  Abdomen: Midline - Wound vac - CDI, Ostomy, NT  Extremities: Edema                Ventilator Settings:    FiO2 (%):  [35] 35    Results from last 7 days   Lab Units 03/19/24  1237   PO2 NICA mm Hg 51.0*     Radiologic Images Reviewed:    CXR  Mild prominent interstitial markings. No pneumothorax or pleural effusion.     Normal cardiomediastinal silhouette.     Bones are unremarkable for age.     Normal upper abdomen.     IMPRESSION:     Mild congestive changes.    CT C/A/P  IMPRESSION:  1. Persistent bilateral groundglass and nodular consolidation with peripheral opacification at  the right greater than left lung bases. Findings remain concerning for multi lobar infiltrates with possible superimposed COVID-19 opacities.  2. Status post ileocolectomy with a right lower quadrant ileostomy again exhibiting a patent stoma. Persistent inflammatory change and subcutaneous emphysema after recent intervention. No drainable collection.  3. Bilateral renal calcifications again suggestive of medullary sponge kidney with persistent mild right renal fullness but no evidence of distal obstructive pathology.        Input / Output:     Intake/Output Summary (Last 24 hours) at 3/25/2024 1110  Last data filed at 3/25/2024 1000  Gross per 24 hour   Intake 2098.67 ml   Output 2925 ml   Net -826.33 ml            Infusions:  dextrose, 100 mL/hr, Last Rate: 100 mL/hr (03/25/24 0800)  insulin regular (HumuLIN R,NovoLIN R) 1 Units/mL in sodium chloride 0.9 % 100 mL infusion, 0.3-21 Units/hr, Last Rate: 4 Units/hr (03/25/24 1006)      Scheduled Medications:  Current Facility-Administered Medications   Medication Dose Route Frequency Provider Last Rate    acetaminophen  650 mg Oral Q6H PRN Moises Bowden MD      chlorhexidine  15 mL Mouth/Throat Q12H SARTHAK Moises Bowden MD      dextrose  100 mL/hr Intravenous Continuous Ammar Alam,  mL/hr (03/25/24 0800)    fentaNYL  50 mcg Intravenous Q1H PRN Moises Bowden MD      insulin regular (HumuLIN R,NovoLIN R) 1 Units/mL in sodium chloride 0.9 % 100 mL infusion  0.3-21 Units/hr Intravenous Titrated Ammar Alam, DO 4 Units/hr (03/25/24 1006)    ipratropium  0.5 mg Nebulization TID Moises Bowden MD      lamoTRIgine  150 mg Oral BID Moises Bowden MD      levalbuterol  1.25 mg Nebulization TID Moises Bowden MD      methylPREDNISolone sodium succinate  20 mg Intravenous Q12H SCH Emilie Soyeon Kim, MD      Followed by    [START ON 3/28/2024] methylPREDNISolone sodium succinate  10 mg Intravenous Q12H SCH Emilie Soyeon Kim, MD      Followed by    [START ON 3/31/2024] methylPREDNISolone sodium  "succinate  10 mg Intravenous Daily Emilie Soyeon Kim, MD      minocycline  200 mg Per NG Tube BID Betzaida Sr MD      modafinil  100 mg Per NG Tube Daily Moises Bowden MD      nystatin   Topical BID Moises Bowden MD      ondansetron  4 mg Intravenous Q6H PRN Moises Bowden MD      pantoprazole  40 mg Intravenous Q12H SARTHAK Moises Bowden MD      polyethylene glycol  17 g Per NG Tube Daily Moises Bowden MD      sodium chloride  1 Application Nasal Q1H PRN Moises Bowden MD      sodium chloride  2 spray Each Nare Q4H Moises Bowden MD      sodium chloride  4 mL Nebulization TID Moises Bowden MD      topiramate  50 mg Per PEG Tube BID Moises Bowden MD      venlafaxine  12.5 mg Oral TID With Meals Moises Bowden MD         PRN Medications:    acetaminophen    fentaNYL    ondansetron    sodium chloride    Labs Reviewed:  Results from last 7 days   Lab Units 03/25/24  0835 03/25/24  0523 03/24/24  0437   WBC Thousand/uL 1.20* 1.31* 1.78*   HEMOGLOBIN g/dL 8.8* 8.7* 10.4*   HEMATOCRIT % 27.8* 27.6* 33.0*   PLATELETS Thousands/uL 38* 40* 54*      Results from last 7 days   Lab Units 03/25/24  0523 03/24/24  0437 03/23/24  0519 03/20/24  1333 03/20/24  0831   SODIUM mmol/L 156* 150* 150*   < >  --    CO2 mmol/L 23 23 21   < >  --    CO2, I-STAT mmol/L  --   --   --   --  15*   BUN mg/dL 72* 68* 75*   < >  --    GLUCOSE, ISTAT mg/dl  --   --   --   --  118   CALCIUM mg/dL 9.4 9.2 8.8   < >  --    MAGNESIUM mg/dL 2.4 2.3 2.5   < >  --    PHOSPHORUS mg/dL 3.7 3.8 4.8*   < >  --     < > = values in this interval not displayed.         Invalid input(s): \"ASTSGOT\", \"ALTSGPT\"LABRCNTIP@ ,alkphos:3,tbilirubin:3,dbilirubin:3)@  Results from last 7 days   Lab Units 03/21/24  1332 03/20/24  0457   INR  1.49* 1.32*           Invalid input(s): \"TROPT\", \"PBNP\"             I have personally seen and examined the patient on (03/25/24 between [id number]). I discussed the patient with the AP/resident including, but not limited to, verifying findings; reviewing labs and " x-rays; discussing with consultants; developing the plan of care with the bedside nurse; and discussing treatment plan with patient or surrogate.  I have reviewed the note and assessment performed by the AP/resident and agree with the AP/resident’s documented findings and plan of care with the above additions/exceptions. Please see my comments for details and adjustments.

## 2024-03-25 NOTE — PLAN OF CARE
Problem: PHYSICAL THERAPY ADULT  Goal: Performs mobility at highest level of function for planned discharge setting.  See evaluation for individualized goals.  Description:    Equipment Recommended:  (none)       See flowsheet documentation for full assessment, interventions and recommendations.  Outcome: Progressing  Note: Prognosis: Fair  Problem List: Decreased strength, Decreased range of motion, Decreased endurance, Impaired balance, Decreased mobility, Decreased cognition, Decreased coordination, Impaired judgement, Decreased safety awareness, Decreased skin integrity, Pain  Assessment: Pt agreeable to PT session. PT session focused on bed mobility, transfers, standing tolerance,  and sitting balance. Pt did tolerate treatment well today. Pt was able to perform bed mobility w/ max assist x 2 (trunk support and LE management provided). Pt was able to perform 2 sit to stands w/ max assist x 3 (HHA x 2 and b/l knee blocking, +1 from behind to support ischial tuberosities). Pt was able to get through full ROM and tolerate standing for 20 seconds. Pt tolerated 5 mins of static sitting balance at EOB w/ max assist x 1. Vitals remained stable throughout treatment and no issues reports by the patient. Pt would continue to benefit from skilled PT. Pt will continue to be seen upon admission. Pt's prognosis is Fair secondary to age, PLOF, and PMH. The patient's AM-PAC Basic Mobility Inpatient Standardized Score is less than 42.9, suggesting this patient may benefit from discharge to post-acute rehabilitation services. Please also refer to the recommendation of the Physical Therapist for safe discharge planning.  Barriers to Discharge: None     Rehab Resource Intensity Level, PT: I (Maximum Resource Intensity)    See flowsheet documentation for full assessment.

## 2024-03-25 NOTE — PROGRESS NOTES
Progress Note - Infectious Disease   Carla Hunt 59 y.o. female MRN: [id number]  Unit/Bed#: MICU 12 Encounter: [id number]      Impression/Plan:    1. Acute hypoxic respiratory failure, likely multifactorial, with both COVID and bacterial pneumonia contributing.  Also consider persistent inflammation, fibrosis as seems quite steroid responsive in past.  Most recently extubated 3/1.  Patient with worsening respiratory status requiring intubation again 3/11.  Suspect ongoing hypoxia related to persistent COVID-pneumonia, superimposed bacterial infection, inflammatory component/lung fibrosis related to COVID, now with profound deconditioning and muscle weakness.  Status post bronchoscopy and trach 3/12 with excessive secretions seen from the lower lobes bilaterally.  BAL culture from 3/11 shows heavy growth of Stenotrophomonas maltophilia.  PJP DFA negative. While the patient has grown this before and she certainly may be colonized, given worsening CT findings, intubation and prolonged steroids would treat as a true pathogen.  Status post 10 days of vancomycin and cefepime.  Started on Bactrim 3/13, switched to minocycline 3/18.  Patient completed 14-day course of remdesivir/Paxlovid 3/15.  CRP improving. Status post repeat bronchoscopy 3/17 for airway clearance with continued thick secretions.  Low concern for uncontrolled infection contributing to clinical picture. Respiratory status improving, patient now on trach collar 6L O2. No fevers.   -Continue p.o. minocycline 200 mg twice daily via NG tube.  Tube feeds should be held for 1 hour prior to and 1 hour after minocycline administration. Recommend a 14 day course through 3/27/24  -Steroid dosing per critical care, tolerating weaning   -No indication to treat Candida in respiratory culture, this is respiratory colonization  -If clinically deteriorates with concern for progressive sepsis would add meropenem 1g IV Q8 to the Bactrim  -Fungitell is positive but  low clinical concern for invasive fungal infection at this time; recent antibiotics, blood transfusion, candida in sputum may be causing false positive result.  The patient has been improving without any antifungal therapy and empiric therapy raises risk of further affecting normal lauren.  Will continue to monitor.  -For completeness, follow up  histoplasma urine antigen     2. SIRS versus sepsis.  Fluctuating fever, WBC.  Likely due to persistent COVID, bacterial pneumonia. CT C/A/P 3/10 shows stable groundglass and nodular opacities, inflammation around stoma. Now with dehiscence of her abdominal wound following staple removal, status post wound VAC placement 3/13. Patient afebrile, without leukocytosis, weaning on trach collar. No new clinical signs of infection  -antibiotics as above  -Follow temperatures closely  -Recheck WBC in AM to monitor infection  -Supportive care as per the primary service     3. Recurrent bacterial pneumonia.  The patient has completed multiple treatment courses over the hospitalization. Prior BAL cultures with Pseudomonas and stenotrophomonas.  Unclear if pathogens or colonizers.  Status post 10 days levofloxacin.  CT C/A/P showed evolving changes likely all due to sequelae of COVID, infections.  Noted to have worsening hypoxia and purulent secretions on bronchoscopy 3/11.  BAL culture with heavy growth of Stenotrophomonas maltophilia, Candida              -Antibiotics as above              -Wean O2 as able     4. Severe COVID, present on admission.  Patient was treated with a 10-day course of remdesivir, dexamethasone and was given 1 dose of Tocilizumab on admission.  COVID antigen was negative prior to coming off isolation.  Had positive COVID PCR from BAL 2/16, status post another 5-day course of remdesivir.  Patient has remained on high-dose systemic corticosteroid throughout her hospitalization which were recently intensified 2/26 for fevers.  Patient having persistent fevers,  hypoxia.  COVID antigen positive and PCR is also positive 3/3 and Ct 26.8, consistent with high viral replication.  Repeat PCR positive with Ct now closer to 30. CRP overall decreasing with slow steroid wean.  Status post 14-day course of remdesivir/Paxlovid 3/15/2024  -Steroid wean per ICU  -No additional antivirals indicated  -check COVID rapid antigen today and at 48 hours. Today's antigen is negative. If repeat rapid antigen Wednesday is negative, patient can be removed from isolation     5. 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  Patient is status post exploratory laparotomy with ileocecectomy and end ileostomy creation 2/20.   End ileostomy is with ongoing ischemic changes which is likely contributing to the imaging findings and ongoing SIRS response.  Now with wound dehiscence status post staple removal, status post wound VAC placement 3/13, removed but replaced due to bleeding  -Serial exams  -Surgery follow-up   -no current signs of infection, need for antibiotics      B-cell lymphoma, on maintenance rituxan, which is currently postponed.  Rituximab is a risk factor for persistent COVID infection.    7.  ELIESER.  More likely due to hypovolemia since creatinine was increasing prior to starting Bactrim.   -Monitor creatinine closely   -Dose adjust antibiotics as needed    8.  Anemia, thrombocytopenia, lymphopenia.  Patient has had persistent lymphopenia throughout this admission, likely due to rituximab, viral infection, high-dose steroids.  Now with worsening anemia, neutropenia, and thrombocytopenia.  Bactrim discontinued 3/18. CMV PCR negative. Hgb now stable after transfusion but white blood count platelets continue to worsen.  Not a likely side effect of minocycline.  Immunoglobulins are low   -Steroid wean per primary   -Transfusion support per primary   -Recommend hematology consult    Above management plan to continue p.o. minocycline, repeat COVID rapid antigen x 2 discussed with  critical care team who is in agreement.  Discussed with the patient's  at bedside.  ID will continue to follow.    Antibiotics:  Day 8 minocycline (day 12 total)    Subjective:  The patient continues to been on trach collar currently on 6 L O2.  No further episodes of bleeding.  She is afebrile, off of vasopressor support.  Patient is more awake and participating with therapy.    Objective:  Vitals:  Temp:  [98.4 °F (36.9 °C)-99.8 °F (37.7 °C)] 98.4 °F (36.9 °C)  HR:  [119-146] 121  Resp:  [16-29] 20  BP: (101-157)/(60-75) 157/75  SpO2:  [95 %-99 %] 96 %  Temp (24hrs), Av.1 °F (37.3 °C), Min:98.4 °F (36.9 °C), Max:99.8 °F (37.7 °C)  Current: Temperature: 98.4 °F (36.9 °C)    Physical Exam:   General Appearance:  Ill-appearing, arousable   Neck: Trach in place.   Lungs:   Rhonchi bilaterally   Heart:  RRR; no murmur, rub or gallop   Abdomen:   Midline ostomy with brown stool, wound VAC in place   Extremities: No edema   Skin: No new rashes or lesions.        Labs:   All pertinent labs and imaging studies were personally reviewed  Results from last 7 days   Lab Units 24  0835 24  0523 24  0437   WBC Thousand/uL 1.20* 1.31* 1.78*   HEMOGLOBIN g/dL 8.8* 8.7* 10.4*   PLATELETS Thousands/uL 38* 40* 54*     Results from last 7 days   Lab Units 24  0523 24  0437 24  0519 24  1333 24  0831 24  0502 24  0543   SODIUM mmol/L 156* 150* 150*   < >  --    < > 138   POTASSIUM mmol/L 4.3 4.4 4.8   < >  --    < > 4.7   CHLORIDE mmol/L 124* 119* 116*   < >  --    < > 107   CO2 mmol/L 23 23 21   < >  --    < > 17*   CO2, I-STAT mmol/L  --   --   --   --  15*  --   --    BUN mg/dL 72* 68* 75*   < >  --    < > 85*   CREATININE mg/dL 0.89 0.99 1.12   < >  --    < > 1.79*   EGFR ml/min/1.73sq m 71 63 54   < >  --    < > 30   GLUCOSE, ISTAT mg/dl  --   --   --   --  118  --   --    CALCIUM mg/dL 9.4 9.2 8.8   < >  --    < > 8.4   AST U/L  --  13   --   --   --  29    ALT U/L  --  41 40  --   --   --  64*   ALK PHOS U/L  --  115* 98  --   --   --  141*    < > = values in this interval not displayed.           Results from last 7 days   Lab Units 03/25/24  0523 03/23/24  0519 03/21/24  1232 03/19/24  0543   CRP mg/L 256.9* 249.3* 226.8* 41.7*           Results from last 7 days   Lab Units 03/20/24  0457   D-DIMER QUANTITATIVE ug/ml FEU 1.92*           Imaging:  CXR personally reviewed and shows mild improvement in multifocal bronchopneumonia

## 2024-03-25 NOTE — OCCUPATIONAL THERAPY NOTE
Occupational Therapy Progress Note     Patient Name: Carla Hunt  Today's Date: 3/25/2024  Problem List  Principal Problem:    Acute respiratory failure with hypoxia (HCC)  Active Problems:    Anxiety state    Encephalopathy    COVID    Stage 3b chronic kidney disease (CKD) (HCC)    Teto marginal zone B-cell lymphoma (HCC)    Seizure disorder (HCC)    Hypotension    Palliative care patient    Goals of care, counseling/discussion    Acute kidney injury (HCC)    Cecal volvulus (HCC)            03/25/24 1513   OT Last Visit   OT Visit Date 03/25/24   Note Type   Note Type Treatment   Pain Assessment   Pain Assessment Tool FLACC   Pain Rating: FLACC (Rest) - Face 0   Pain Rating: FLACC (Rest) - Legs 0   Pain Rating: FLACC (Rest) - Activity 0   Pain Rating: FLACC (Rest) - Cry 0   Pain Rating: FLACC (Rest) - Consolability 0   Score: FLACC (Rest) 0   Pain Rating: FLACC (Activity) - Face 0   Pain Rating: FLACC (Activity) - Legs 0   Pain Rating: FLACC (Activity) - Activity 0   Pain Rating: FLACC (Activity) - Cry 0   Pain Rating: FLACC (Activity) - Consolability 0   Score: FLACC (Activity) 0   Restrictions/Precautions   Weight Bearing Precautions Per Order No   Braces or Orthoses Other (Comment)  (B/L resting hand splints)   Other Precautions Cognitive;Limb alert;Multiple lines;Telemetry;O2;Fall Risk;Airborne/isolation;Contact/isolation   Lifestyle   Autonomy I adls and mobility - i iadls - shares homemaking with family   Reciprocal Relationships supportive family   Service to Others volunteers   Intrinsic Gratification Enjoys spending time with her family   ADL   Grooming Assistance 2  Maximal Assistance   Grooming Deficit   (Brush hair with RUE, assistance to maintain , raise arm to bring brush to head)   Functional Standing Tolerance   Time 2x20 seconds EOB   Bed Mobility   Supine to Sit 2  Maximal assistance   Additional items Assist x 2;HOB elevated   Sit to Supine 2  Maximal assistance   Additional items  Assist x 2   Transfers   Sit to Stand 2  Maximal assistance   Additional items Assist x 3  (B/L knee block)   Stand to Sit 2  Maximal assistance   Additional items Assist x 3   Cognition   Overall Cognitive Status Impaired   Arousal/Participation Responsive;Arousable;Cooperative   Attention WILL   Orientation Level Unable to assess   Memory Unable to assess   Following Commands Follows one step commands with increased time or repetition   Activity Tolerance   Activity Tolerance Patient limited by fatigue   Medical Staff Made Aware PT, restorative present   Assessment   Assessment Pt was seen for skilled OT treatment session on 3/35/24, fousuing on sitting/standing tolerance to assist with safe transfers, UB grooming while seated EOB to assist with engagement in functional tasks/ADLs. Pt was supine in bed, agreeable to session. Pt required max A x2 bed mobility to sit EOB. Pt demonstrated poor+ sitting balance, with mod A x1 for support. Pt required max A x3 for sts transfer, tolerating 2 sts trials, standing for 20 seconds. While seated EOB, pt particiapted in UB grooming using brush. Pt required max A to brush hair, requiring asssitance to maintain , raise arm to reach head/cross midline to reach L side. Pt was returned supine in bed in chair position. Pt demonstrated improvement in sitting/standing tolerance/balance, however continues to be functioning below baseline. OT will continue to monitor/treat.   Plan   Treatment Interventions ADL retraining;Endurance training;Functional transfer training;UE strengthening/ROM;Continued evaluation;Activityengagement;Neuromuscular reeducation   Goal Expiration Date 04/02/24   OT Treatment Day 14   OT Frequency 3-5x/wk   Discharge Recommendation   Rehab Resource Intensity Level, OT II (Moderate Resource Intensity)   AM-PAC Daily Activity Inpatient   Lower Body Dressing 1   Bathing 1   Toileting 1   Upper Body Dressing 1   Grooming 1   Eating 1   Daily Activity Raw Score 6    AM-PAC Applied Cognition Inpatient   Following a Speech/Presentation 1   Understanding Ordinary Conversation 2   Taking Medications 1   Remembering Where Things Are Placed or Put Away 1   Remembering List of 4-5 Errands 1   Taking Care of Complicated Tasks 1   Applied Cognition Raw Score 7   Applied Cognition Standardized Score 15.17   End of Consult   Patient Position at End of Consult Supine;All needs within reach   Nurse Communication Nurse aware of consult   Ian Epps

## 2024-03-25 NOTE — UTILIZATION REVIEW
Continued Stay Review    Date: 03/23, 03/24, 03/25                          Current Patient Class: IP  Current Level of Care: Level 2 stepdown/HOT    HPI:59 y.o. female initially admitted on 01/10     Assessment/Plan:   03/23 No acute ovn events. Rpt CXR showed improvement. Continue to wean trach collar currently on 8L/35%. Cont  minocycline until 03/26. Speech/swallow eval. Cont TF w/ free water flushes. F/up CD4 and anti-ritux antibody. 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 Place marie, required multiple straight caths. Continue to wean steroids by 10mg q3 days.    03/24 cont to wean trach collar, down to 8L. Cont  minocycline until 03/26. PT/OT and speech therapy. Continue tube feeds, increase free water flushes. Follow  up CD4 and anti rituximab IgG. Wound care for stoma and tracheal skin ulceration. Marie cath since failed voiding trial. Last day of 30 BID steroids, plan for 20mg BID tomorrow. Keep NG tube for now. Will need eventual downsizing of her trach. Nephrology and Gen surg ff.     03/25 Hgb dropping to 8.7 today despite hypernatremia/being dry. Plt dropping <50 today. Rpt H&H, hemolysis workup including LD, haptoglobin, smear, bili. Hpld Lovenox. Failed bedside speech, Consider barium swallow pending COVID negative status per speech. Na worsened to 156 despite adjusting free water flushes to 350cc q4h from 300cc. FWD 1.6L, Goal Na 150. Start D5W 100cc x6h. GI consultation for potential EGD inpatient now that patient is HDS.   Cont to wean trach collar- on 8L trach mask. Cont  minocycline until 03/26. Con TF w/ free water flushes. Steroids at 20 mg BID. Cont wound and stoma care.    Vital Signs:   Date/Time Temp Pulse Resp BP MAP (mmHg) SpO2 FiO2 (%) O2 Flow Rate (L/min) O2 Device Patient Position - Orthostatic VS   03/25/24 1400 -- 118 Abnormal  17 108/67 83 98 % -- -- -- --   03/25/24 1300 -- 116 Abnormal  18 125/75 95 97 % -- -- -- --   03/25/24 1200 -- 120 Abnormal  17  129/72 94 97 % -- -- -- --   03/25/24 0802 -- 121 Abnormal  20 157/75 102 96 % 35 8 L/min Trach mask --   03/25/24 0800 98.4 °F (36.9 °C) 122 Abnormal  19 157/75 102 96 % -- -- -- --   03/25/24 0700 -- 122 Abnormal  20 128/74 93 96 % -- -- -- --   03/25/24 0500 -- 122 Abnormal  18 -- -- 95 % -- -- -- --   03/25/24 0400 98.8 °F (37.1 °C) 119 Abnormal  20 122/72 90 98 % 35 -- Trach mask --   03/25/24 0300 -- 123 Abnormal  16 124/73 91 99 % -- -- -- --   03/25/24 0200 -- 122 Abnormal  24 Abnormal  113/68 85 98 % -- -- -- --   03/25/24 0100 -- 124 Abnormal  24 Abnormal  106/65 80 97 % -- -- -- --   03/25/24 0000 99.8 °F (37.7 °C) 124 Abnormal  22 116/67 85 96 % -- -- -- --   03/24/24 2300 -- 130 Abnormal  21 108/61 79 96 % -- -- -- --   03/24/24 2200 -- 130 Abnormal  29 Abnormal  101/60 75 97 % -- -- -- --   03/24/24 2100 -- 130 Abnormal  20 107/61 79 97 % -- -- -- --   03/24/24 2004 -- 137 Abnormal  24 Abnormal  110/68 83 95 % 35 8 L/min Trach mask --   03/24/24 2000 99 °F (37.2 °C) 135 Abnormal  23 Abnormal  110/68 83 97 % -- -- -- --   03/24/24 1900 -- 133 Abnormal  18 108/64 80 98 % -- -- -- --   03/24/24 1800 -- 139 Abnormal  21 112/70 87 97 % -- -- -- --   03/24/24 1700 -- 146 Abnormal  29 Abnormal  114/72 88 99 % -- -- -- --   03/24/24 1600 99.5 °F (37.5 °C) 144 Abnormal  22 118/72 90 98 % -- -- -- --   03/24/24 1500 -- 144 Abnormal  24 Abnormal  128/74 95 98 % -- -- -- --   03/24/24 1441 -- -- -- -- -- 97 % 35 10 L/min Trach mask --   03/24/24 1400 -- 135 Abnormal  20 119/75 93 95 % -- -- -- --   03/24/24 1300 -- 129 Abnormal  17 118/73 90 94 % -- -- -- --   03/24/24 1200 -- 130 Abnormal  15 117/78 94 94 % -- -- -- --   03/24/24 1100 -- 128 Abnormal  18 128/73 96 97 % -- -- -- --   03/24/24 1000 -- 126 Abnormal  17 136/78 102 98 % -- -- -- --   03/24/24 0900 -- 130 Abnormal  16 148/84 108 99 % -- -- -- --   03/24/24 0810 -- -- -- -- -- -- 35 10 L/min Trach mask --   03/24/24 0800 99.6 °F (37.6 °C) 131 Abnormal   20 143/80 100 98 % -- -- -- --   03/24/24 0600 -- 130 Abnormal  17 -- -- 98 % -- -- -- --   03/24/24 0500 -- 128 Abnormal  16 -- -- 98 % -- -- -- --   03/24/24 0400 97.6 °F (36.4 °C) 133 Abnormal  17 137/67 96 97 % -- -- -- --   03/24/24 0300 -- 135 Abnormal  18 130/67 92 97 % -- -- -- --   03/24/24 0200 -- 135 Abnormal  18 133/74 96 96 % -- -- -- --   03/24/24 0100 -- 130 Abnormal  17 119/61 83 98 % -- -- -- --   03/24/24 0000 97.8 °F (36.6 °C) 133 Abnormal  18 121/67 88 98 % -- -- -- --   03/23/24 2300 -- 130 Abnormal  17 109/56 78 99 % -- -- -- --   03/23/24 2200 -- 130 Abnormal  20 120/65 88 98 % -- -- -- --   03/23/24 2100 -- 128 Abnormal  20 127/66 91 97 % -- -- -- --   03/23/24 2000 98 °F (36.7 °C) 130 Abnormal  18 123/60 85 97 % -- -- Trach mask Lying   03/23/24 1950 -- -- -- -- -- -- 40 10 L/min Trach mask --   03/23/24 1800 -- 126 Abnormal  17 134/61 88 99 % -- -- -- --   03/23/24 1700 -- 123 Abnormal  17 130/66 88 98 % -- -- -- --   03/23/24 1600 97.8 °F (36.6 °C) 117 Abnormal  14 126/65 88 98 % -- -- -- --   03/23/24 1535 -- -- -- -- -- -- 40 10 L/min Trach mask --   03/23/24 1500 -- 117 Abnormal  16 125/69 93 98 % -- -- -- --   03/23/24 1400 -- 114 Abnormal  13 130/76 99 99 % -- -- -- --   03/23/24 1300 -- 116 Abnormal  16 140/77 104 98 % -- -- -- --   03/23/24 1200 97.5 °F (36.4 °C) 116 Abnormal  14 133/69 95 99 % -- -- -- --   03/23/24 1100 -- 122 Abnormal  14 132/71 97 99 % -- -- -- --   03/23/24 1000 -- 124 Abnormal  17 147/70 101 99 % -- -- -- --   03/23/24 0900 -- 128 Abnormal  17 134/71 97 99 % -- -- -- --   03/23/24 0800 97.6 °F (36.4 °C) 132 Abnormal  20 134/74 99 98 % -- -- -- --   03/23/24 0600 -- 126 Abnormal  16 145/76 105 99 % -- -- -- --   03/23/24 0500 -- 124 Abnormal  17 142/76 102 99 % -- -- -- --   03/23/24 0400 98.4 °F (36.9 °C) 121 Abnormal  16 120/63 86 99 % -- -- -- --   03/23/24 0327 -- -- -- -- -- -- 40 10 L/min Trach mask --   03/23/24 0300 -- 129 Abnormal  20 129/74 96 99 % --  -- -- --   03/23/24 0200 -- 123 Abnormal  18 139/86 109 96 % -- -- -- --   03/23/24 0100 -- 118 Abnormal  16 129/72 94 96 % -- -- -- --   03/23/24 0000 98.8 °F (37.1 °C) 122 Abnormal  18 142/86 109 97 % -- -- Trach mask Lying       Pertinent Labs/Diagnostic Results:   03/23 VAS upper limb venous duplex scan:  RIGHT UPPER LIMB LIMITED:  Unable to evaluate the neck veins due to medical devises.     LEFT UPPER LIMB:  No evidence of acute or chronic deep vein thrombosis. Unable to evaluate IJV  and innominate vein due medical devises.  There is evidence of acute occlusive superficial thrombophlebitis noted in the  cephalic vein at the elbow and distal upper arm.  Doppler evaluation shows a normal response to augmentation maneuvers.              Results from last 7 days   Lab Units 03/25/24  0835 03/25/24  0523 03/24/24  0437 03/23/24  0519 03/22/24  0524 03/20/24  0457 03/19/24  0543   WBC Thousand/uL 1.20* 1.31* 1.78* 2.43* 3.89*   < > 9.92   HEMOGLOBIN g/dL 8.8* 8.7* 10.4* 9.7* 9.8*   < > 9.5*   I STAT HEMOGLOBIN   --   --   --   --   --    < >  --    HEMATOCRIT % 27.8* 27.6* 33.0* 29.9* 28.6*   < > 28.1*   HEMATOCRIT, ISTAT   --   --   --   --   --    < >  --    PLATELETS Thousands/uL 38* 40* 54* 68* 77*   < > 86*   NEUTROS ABS Thousands/µL 0.94*  --   --   --   --   --   --    TOTAL NEUT ABS Thousand/uL  --  1.07*  --   --   --   --   --    BANDS PCT %  --  30*  --  3  --   --  7    < > = values in this interval not displayed.     Results from last 7 days   Lab Units 03/25/24  0523   RETIC CT ABS  28,400   RETIC CT PCT % 1.01     Results from last 7 days   Lab Units 03/25/24  1234 03/25/24  0523 03/24/24  0437 03/23/24  0519 03/22/24  0524 03/21/24  1332 03/21/24  0557 03/20/24  1333 03/20/24  0831   SODIUM mmol/L 153* 156* 150* 150* 145   < >  --    < >  --    POTASSIUM mmol/L 4.1 4.3 4.4 4.8 4.0   < >  --    < >  --    CHLORIDE mmol/L 123* 124* 119* 116* 113*   < >  --    < >  --    CO2 mmol/L 22 23 23 21 20*   <  ">  --    < >  --    CO2, I-STAT mmol/L  --   --   --   --   --   --   --   --  15*   ANION GAP mmol/L 8 9 8 13 12   < >  --    < >  --    BUN mg/dL 70* 72* 68* 75* 77*   < >  --    < >  --    CREATININE mg/dL 0.89 0.89 0.99 1.12 1.33*   < >  --    < >  --    EGFR ml/min/1.73sq m 71 71 63 54 44   < >  --    < >  --    CALCIUM mg/dL 9.2 9.4 9.2 8.8 8.8   < >  --    < >  --    CALCIUM, IONIZED, ISTAT mmol/L  --   --   --   --   --   --   --   --  1.40*   MAGNESIUM mg/dL  --  2.4 2.3 2.5 2.3  --  2.2  --   --    PHOSPHORUS mg/dL  --  3.7 3.8 4.8* 5.9*  --  5.4*  --   --     < > = values in this interval not displayed.     Results from last 7 days   Lab Units 03/25/24  0523 03/24/24  0437 03/23/24  0519 03/19/24  0543   AST U/L  --  13 21 29   ALT U/L  --  41 40 64*   ALK PHOS U/L  --  115* 98 141*   TOTAL PROTEIN g/dL  --  5.0* 4.6* 4.6*   ALBUMIN g/dL  --  2.5* 2.3* 2.5*   TOTAL BILIRUBIN mg/dL 0.53 0.43 0.45 0.47     Results from last 7 days   Lab Units 03/25/24  1354 03/25/24  1003 03/25/24  0753 03/25/24  0615 03/25/24  0442 03/25/24  0213 03/24/24  2350 03/24/24  2211 03/24/24 2009 03/24/24  1803 03/24/24  1549 03/24/24  1401   POC GLUCOSE mg/dl 187* 167* 157* 153* 136 128 106 63* 121 173* 221* 273*     Results from last 7 days   Lab Units 03/25/24  1234 03/25/24  0523 03/24/24  0437 03/23/24  0519 03/22/24  0524 03/21/24  1332 03/21/24  0552 03/20/24  1333 03/20/24  0502 03/19/24  0543   GLUCOSE RANDOM mg/dL 177* 152* 154* 194* 176* 247* 132 186* 117 207*             No results found for: \"BETA-HYDROXYBUTYRATE\"   Results from last 7 days   Lab Units 03/21/24  1512 03/21/24  0018 03/20/24  1606   PH ART  7.374 7.339* 7.278*   PCO2 ART mm Hg 32.6* 33.6* 33.8*   PO2 ART mm Hg 81.7 91.4 99.4   HCO3 ART mmol/L 18.6* 17.7* 15.5*   BASE EXC ART mmol/L -5.8 -7.2 -10.3   O2 CONTENT ART mL/dL 14.4* 14.5* 15.4*   O2 HGB, ARTERIAL % 95.1 95.5 96.2   ABG SOURCE  Line, Arterial Line, Arterial Line, Arterial     Results from " last 7 days   Lab Units 03/19/24  1237   PH NICA  7.242*   PCO2 NICA mm Hg 40.2*   PO2 NICA mm Hg 51.0*   HCO3 NICA mmol/L 16.9*   BASE EXC NICA mmol/L -9.8   O2 CONTENT NICA ml/dL 11.5   O2 HGB, VENOUS % 81.7*     Results from last 7 days   Lab Units 03/20/24  0831   PH, NICA I-STAT  7.323   PCO2, NICA ISTAT mm HG 27.7*   PO2, NICA ISTAT mm HG 57.0*   HCO3, NICA ISTAT mmol/L 14.4*   I STAT BASE EXC mmol/L -11*   I STAT O2 SAT % 88*     Results from last 7 days   Lab Units 03/19/24  1721   CK TOTAL U/L 47         Results from last 7 days   Lab Units 03/20/24  0457   D-DIMER QUANTITATIVE ug/ml FEU 1.92*     Results from last 7 days   Lab Units 03/21/24  1332 03/20/24  0457   PROTIME seconds 17.8* 16.2*   INR  1.49* 1.32*   PTT seconds 40* 34             Results from last 7 days   Lab Units 03/21/24  1332 03/20/24  0840   LACTIC ACID mmol/L 0.8 1.2                         Results from last 7 days   Lab Units 03/22/24  0844 03/22/24  0555 03/20/24  0840 03/20/24  0837 03/20/24  0834 03/20/24  0834 03/20/24  0830 03/19/24  0555   UNIT PRODUCT CODE  S1215J51  T3731I33  I0800S20  T2830N76 G0366Q62 W2557G90 Q2628J95  F2136V81  L8337T49  B6791U08 I4572A85 B6653F17  E7502X35  L4311V00  N6398C86 F2732J84  J7512I77  N0462U10  N2915J51  E6727D33  Q2158O45 P2196B40  A2056R22   UNIT NUMBER  [id number]  [id number]  [id number]  [id number]  [id number]  [id number]  [id number]  [id number]  [id number]  [id number]  [id number]  [id number]  [id number]  [id number]  [id number]  [id number]   UNITABO  A  A  A  A A AB O  O  O  O A AB  AB  A  A O  O  O  O  O  O A  A   UNITRH  NEG  NEG  NEG  NEG POS POS POS  POS  POS  POS POS POS  POS  POS  POS POS  POS  POS  POS  POS  POS NEG  NEG   CROSSMATCH  Compatible   Compatible  Compatible  Compatible  --   --   --   --   --  Compatible  Compatible Compatible  Compatible   UNIT DISPENSE STATUS  Return to Inv  Return to Inv  Return to Inv  Presumed Trans Presumed Trans Return to Inv Return to Inv  Return to Inv  Return to Inv  Return to Inv Return to Inv Return to Inv  Return to Inv  Return to Inv  Return to Inv Return to Inv  Presumed Trans  Return to Inv  Return to Inv  Presumed Trans  Return to Inv Presumed Trans  Presumed Trans   UNIT PRODUCT VOL mL 350  350  350  350 248 246 300  300  350  350 300 191  203  280  250 350  350  300  350  350  350 350  350             Results from last 7 days   Lab Units 03/25/24  0523 03/23/24  0519 03/21/24  1232 03/19/24  0543   CRP mg/L 256.9* 249.3* 226.8* 41.7*                     Results from last 7 days   Lab Units 03/25/24  0523   TOTAL COUNTED  100               Medications:   Scheduled Medications:  chlorhexidine, 15 mL, Mouth/Throat, Q12H SARTHAK  ipratropium, 0.5 mg, Nebulization, TID  lamoTRIgine, 150 mg, Oral, BID  levalbuterol, 1.25 mg, Nebulization, TID  methylPREDNISolone sodium succinate, 20 mg, Intravenous, Q12H SARTHAK   Followed by  [START ON 3/28/2024] methylPREDNISolone sodium succinate, 10 mg, Intravenous, Q12H SARTHAK   Followed by  [START ON 3/31/2024] methylPREDNISolone sodium succinate, 10 mg, Intravenous, Daily  minocycline, 200 mg, Per NG Tube, BID  nystatin, , Topical, BID  pantoprazole, 40 mg, Intravenous, Q12H SARTHAK  polyethylene glycol, 17 g, Per NG Tube, Daily  sodium chloride, 2 spray, Each Nare, Q4H  sodium chloride, 4 mL, Nebulization, TID  topiramate, 50 mg, Per PEG Tube, BID  venlafaxine, 12.5 mg, Oral, TID With Meals      Continuous IV Infusions:  insulin regular (HumuLIN R,NovoLIN R) 1 Units/mL in sodium chloride 0.9 % 100 mL infusion, 0.3-21 Units/hr, Intravenous, Titrated      PRN Meds:  acetaminophen, 650 mg, Oral, Q6H PRN  fentaNYL, 50 mcg, Intravenous, Q1H PRN  ondansetron, 4  mg, Intravenous, Q6H PRN  sodium chloride, 1 Application, Nasal, Q1H PRN        Discharge Plan: TBD    Network Utilization Review Department  ATTENTION: Please call with any questions or concerns to [phone] and carefully listen to the prompts so that you are directed to the right person. All voicemails are confidential.   For Discharge needs, contact Care Management DC Support Team at [phone] opt. 2  Send all requests for admission clinical reviews, approved or denied determinations and any other requests to dedicated fax number below belonging to the [facility] where the patient is receiving treatment. List of dedicated fax numbers for the Facilities:  FACILITY NAME UR FAX NUMBER   ADMISSION DENIALS (Administrative/Medical Necessity) [phone]   DISCHARGE SUPPORT TEAM (NETWORK) [phone]   PARENT CHILD HEALTH (Maternity/NICU/Pediatrics) [phone]   [facility] [phone]   [facility] [phone]   [facility] [phone]   [facility] [phone]   [facility] [phone]   [facility] [phone]   [facility] [phone]   [facility] [phone]   [facility] [phone]   Pending sale to [facility] [phone]   [facility] [phone]   [facility] [phone]

## 2024-03-25 NOTE — PROCEDURES
Acute Care Surgery  Bedside V.A.C. Procedure Note    The nurse was debriefed at the completion of the dressing change.    Location of wound: Midline abdomen    Dressings and Foam removed:  2 Black Foam - 1 from wound; 1 outside of wound as bridge  1 White Foam    Dimensions of wound: 10 cm x 4 cm x 4 cm    Description of wound: On inspection of the wound today, there was some stool contamination of the midline wound via the subcutaneous communication with the peristomal wound.  Some venous oozing was addressed with direct pressure with hemostasis achieved.  There was no necrotic or nonviable tissue requiring debridement.  The skin around the midline wound and peristomal wound was a little bit irritated from the adhesive.    VAC dressing application:  The periwound skin was cleaned and dried; the skin was then prepped with No Sting barrier film. Dr. Workman was present for the dressing change and assisted with the procedure.  He placed 2 figure-of-eight sutures in the subcutaneous tissue and attempt to close the subcutaneous communication between the midline wound and the peristomal wound using 2-0 Vicryl suture material.  Next, 1 piece of oil emulsion gauze dressing was placed at the wound base followed by 1 piece of along the wound bordering the peristomal wound and subsequently 1 piece of black foam was placed into the wound. The dressings were then covered with VAC drape. Additional VAC drape and black foam was used to create a bridge to the patient's left lateral abdominal wall and a base for the track pad. The track pad was then placed over the base of black foam. The VAC was then set to 100 mmHg low Continuous suction. The patient tolerated the procedure well and there were no complications. The patient did not require any excisional debridement during today's dressing change.    VAC settings:  100 mmHg  Continuous    Additional Notes:  The VAC sticker placed over the dressing per protocol.  The next VAC  dressing change will be planned for Wednesday, 3/27/2024.  Dr. Workman, Tish Liao PA-C and Justine Zamarripa, MS-3 were present for the dressing change.    This dressing change took greater than 30 minutes to complete.    Ajit Gr PA-C  3/25/2024 1:38 PM

## 2024-03-25 NOTE — PLAN OF CARE
Problem: OCCUPATIONAL THERAPY ADULT  Goal: Performs self-care activities at highest level of function for planned discharge setting.  See evaluation for individualized goals.  Description: Treatment Interventions: ADL retraining, Functional transfer training, UE strengthening/ROM, Endurance training, Patient/family training, Equipment evaluation/education, Compensatory technique education, Continued evaluation, Energy conservation, Activityengagement          See flowsheet documentation for full assessment, interventions and recommendations.   Outcome: Progressing  Note: Limitation: Decreased ADL status, Decreased UE ROM, Decreased UE strength, Decreased Safe judgement during ADL, Decreased cognition, Decreased endurance, Decreased self-care trans, Decreased high-level ADLs, Non-func R UE, Non-func L UE  Prognosis: Fair, Poor  Assessment: Pt was seen for skilled OT treatment session on 3/35/24, fousuing on sitting/standing tolerance to assist with safe transfers, UB grooming while seated EOB to assist with engagement in functional tasks/ADLs. Pt was supine in bed, agreeable to session. Pt required max A x2 bed mobility to sit EOB. Pt demonstrated poor+ sitting balance, with mod A x1 for support. Pt required max A x3 for sts transfer, tolerating 2 sts trials, standing for 20 seconds. While seated EOB, pt particiapted in UB grooming using brush. Pt required max A to brush hair, requiring asssitance to maintain , raise arm to reach head/cross midline to reach L side. Pt was returned supine in bed in chair position. Pt demonstrated improvement in sitting/standing tolerance/balance, however continues to be functioning below baseline. OT will continue to monitor/treat.     Rehab Resource Intensity Level, OT: II (Moderate Resource Intensity)

## 2024-03-25 NOTE — ASSESSMENT & PLAN NOTE
Time spent providing supportive listening to spouse Min and daughter Cathryn at bedside  Also supported by her daughter, son, and robust  group of friends, brother, and natalia community  Decisional apparatus:  Patient is not competent on my exam today.  If competence is lost, patient's substitute decision maker would default to spouse Min by [place] Act 169.  Advance Directive / Living Will / POLST:  None on file, unable to complete  Continue provigil 100mg QD with improvement of energy and participation with PT/OT

## 2024-03-25 NOTE — PLAN OF CARE
Problem: Prexisting or High Potential for Compromised Skin Integrity  Goal: Skin integrity is maintained or improved  Description: INTERVENTIONS:  - Identify patients at risk for skin breakdown  - Assess and monitor skin integrity  - Assess and monitor nutrition and hydration status  - Monitor labs   - Assess for incontinence   - Turn and reposition patient  - Assist with mobility/ambulation  - Relieve pressure over bony prominences  - Avoid friction and shearing  - Provide appropriate hygiene as needed including keeping skin clean and dry  - Evaluate need for skin moisturizer/barrier cream  - Collaborate with interdisciplinary team   - Patient/family teaching  - Consider wound care consult   Outcome: Progressing     Problem: Nutrition/Hydration-ADULT  Goal: Nutrient/Hydration intake appropriate for improving, restoring or maintaining nutritional needs  Description: Monitor and assess patient's nutrition/hydration status for malnutrition. Collaborate with interdisciplinary team and initiate plan and interventions as ordered.  Monitor patient's weight and dietary intake as ordered or per policy. Utilize nutrition screening tool and intervene as necessary. Determine patient's food preferences and provide high-protein, high-caloric foods as appropriate.     INTERVENTIONS:  - Monitor oral intake, urinary output, labs, and treatment plans  - Assess nutrition and hydration status and recommend course of action  - Evaluate amount of meals eaten  - Assist patient with eating if necessary   - Allow adequate time for meals  - Recommend/ encourage appropriate diets, oral nutritional supplements, and vitamin/mineral supplements  - Order, calculate, and assess calorie counts as needed  - Recommend, monitor, and adjust tube feedings and TPN/PPN based on assessed needs  - Assess need for intravenous fluids  - Provide specific nutrition/hydration education as appropriate  - Include patient/family/caregiver in decisions related to  nutrition  Outcome: Progressing     Problem: INFECTION - ADULT  Goal: Absence or prevention of progression during hospitalization  Description: INTERVENTIONS:  - Assess and monitor for signs and symptoms of infection  - Monitor lab/diagnostic results  - Monitor all insertion sites, i.e. indwelling lines, tubes, and drains  - Monitor endotracheal if appropriate and nasal secretions for changes in amount and color  - [place] appropriate cooling/warming therapies per order  - Administer medications as ordered  - Instruct and encourage patient and family to use good hand hygiene technique  - Identify and instruct in appropriate isolation precautions for identified infection/condition  Outcome: Progressing     Problem: SAFETY ADULT  Goal: Patient will remain free of falls  Description: INTERVENTIONS:  - Educate patient/family on patient safety including physical limitations  - Instruct patient to call for assistance with activity   - Consult OT/PT to assist with strengthening/mobility   - Keep Call bell within reach  - Keep bed low and locked with side rails adjusted as appropriate  - Keep care items and personal belongings within reach  - Initiate and maintain comfort rounds  - Make Fall Risk Sign visible to staff  - Offer Toileting every 2 Hours, in advance of need  - Initiate/Maintain bed alarm  - Obtain necessary fall risk management equipment: non skid footwear  - Apply yellow socks and bracelet for high fall risk patients  - Consider moving patient to room near nurses station  Outcome: Progressing     Problem: RESPIRATORY - ADULT  Goal: Achieves optimal ventilation and oxygenation  Description: INTERVENTIONS:  - Assess for changes in respiratory status  - Assess for changes in mentation and behavior  - Position to facilitate oxygenation and minimize respiratory effort  - Oxygen administered by appropriate delivery if ordered  - Initiate smoking cessation education as indicated  - Encourage broncho-pulmonary  hygiene including cough, deep breathe, Incentive Spirometry  - Assess the need for suctioning and aspirate as needed  - Assess and instruct to report SOB or any respiratory difficulty  - Respiratory Therapy support as indicated  Outcome: Progressing     Problem: SKIN/TISSUE INTEGRITY - ADULT  Goal: Skin Integrity remains intact(Skin Breakdown Prevention)  Description: Assess:  -Perform Flavio assessment every shift   -Clean and moisturize skin every day   -Inspect skin when repositioning, toileting, and assisting with ADLS  -Assess under medical devices such as ronny  every hour   -Assess extremities for adequate circulation and sensation     Bed Management:  -Have minimal linens on bed & keep smooth, unwrinkled  -Change linens as needed when moist or perspiring  -Avoid sitting or lying in one position for more than 2 hours while in bed  -Keep HOB at 45 degrees     Toileting:  -Offer bedside commode  -Assess for incontinence every hour  -Use incontinent care products after each incontinent episode such as moisture barrier cream     Activity:  -Mobilize patient 3 times a day  -Encourage activity and walks on unit  -Encourage or provide ROM exercises   -Turn and reposition patient every 2 Hours  -Use appropriate equipment to lift or move patient in bed  -Instruct/ Assist with weight shifting every hour when out of bed in chair  -Consider limitation of chair time 2 hour intervals    Skin Care:  -Avoid use of baby powder, tape, friction and shearing, hot water or constrictive clothing  -Relieve pressure over bony prominences using allevyn   -Do not massage red bony areas    Next Steps:  -Teach patient strategies to minimize risks such as weight shifting    -Consider consults to  interdisciplinary teams such as PT  Outcome: Progressing  Goal: Incision(s), wounds(s) or drain site(s) healing without S/S of infection  Description: INTERVENTIONS  - Assess and document dressing, incision, wound bed, drain sites and  surrounding tissue  - Provide patient and family education  Outcome: Progressing  Goal: Pressure injury heals and does not worsen  Description: Interventions:  - Implement low air loss mattress or specialty surface (Criteria met)  - Apply silicone foam dressing  - Apply fecal or urinary incontinence containment device   - Utilize friction reducing device or surface for transfers   - Consider nutrition services referral as needed  Outcome: Progressing     Problem: NEUROSENSORY - ADULT  Goal: Achieves stable or improved neurological status  Description: INTERVENTIONS  - Monitor and report changes in neurological status  - Monitor vital signs such as temperature, blood pressure, glucose, and any other labs ordered   - Initiate measures to prevent increased intracranial pressure  - Monitor for seizure activity and implement precautions if appropriate      Outcome: Progressing  Goal: Achieves maximal functionality and self care  Description: INTERVENTIONS  - Monitor swallowing and airway patency with patient fatigue and changes in neurological status  - Encourage and assist patient to increase activity and self care.   - Encourage visually impaired, hearing impaired and aphasic patients to use assistive/communication devices  Outcome: Progressing     Problem: CARDIOVASCULAR - ADULT  Goal: Maintains optimal cardiac output and hemodynamic stability  Description: INTERVENTIONS:  - Monitor I/O, vital signs and rhythm  - Monitor for S/S and trends of decreased cardiac output  - Administer and titrate ordered vasoactive medications to optimize hemodynamic stability  - Assess quality of pulses, skin color and temperature  - Assess for signs of decreased coronary artery perfusion  - Instruct patient to report change in severity of symptoms  Outcome: Progressing  Goal: Absence of cardiac dysrhythmias or at baseline rhythm  Description: INTERVENTIONS:  - Continuous cardiac monitoring, vital signs, obtain 12 lead EKG if ordered  -  Administer antiarrhythmic and heart rate control medications as ordered  - Monitor electrolytes and administer replacement therapy as ordered  Outcome: Progressing     Problem: GASTROINTESTINAL - ADULT  Goal: Minimal or absence of nausea and/or vomiting  Description: INTERVENTIONS:  - Administer IV fluids if ordered to ensure adequate hydration  - Maintain NPO status until nausea and vomiting are resolved  - Nasogastric tube if ordered  - Administer ordered antiemetic medications as needed  - Provide nonpharmacologic comfort measures as appropriate  - Advance diet as tolerated, if ordered  - Consider nutrition services referral to assist patient with adequate nutrition and appropriate food choices  Outcome: Progressing  Goal: Maintains or returns to baseline bowel function  Description: INTERVENTIONS:  - Assess bowel function  - Encourage oral fluids to ensure adequate hydration  - Administer IV fluids if ordered to ensure adequate hydration  - Administer ordered medications as needed  - Encourage mobilization and activity  - Consider nutritional services referral to assist patient with adequate nutrition and appropriate food choices  Outcome: Progressing  Goal: Maintains adequate nutritional intake  Description: INTERVENTIONS:  - Monitor percentage of each meal consumed  - Identify factors contributing to decreased intake, treat as appropriate  - Assist with meals as needed  - Monitor I&O, weight, and lab values if indicated  - Obtain nutrition services referral as needed  Outcome: Progressing  Goal: Establish and maintain optimal ostomy function  Description: INTERVENTIONS:  - Assess bowel function  - Encourage oral fluids to ensure adequate hydration  - Administer IV fluids if ordered to ensure adequate hydration   - Administer ordered medications as needed  - Encourage mobilization and activity  - Nutrition services referral to assist patient with appropriate food choices  - Assess stoma site  - Consider wound  care consult   Outcome: Progressing  Goal: Oral mucous membranes remain intact  Description: INTERVENTIONS  - Assess oral mucosa and hygiene practices  - Implement preventative oral hygiene regimen  - Implement oral medicated treatments as ordered  - Initiate Nutrition services referral as needed  Outcome: Progressing     Problem: GENITOURINARY - ADULT  Goal: Maintains or returns to baseline urinary function  Description: INTERVENTIONS:  - Assess urinary function  - Encourage oral fluids to ensure adequate hydration if ordered  - Administer IV fluids as ordered to ensure adequate hydration  - Administer ordered medications as needed  - Offer frequent toileting  - Follow urinary retention protocol if ordered  Outcome: Progressing  Goal: Urinary catheter remains patent  Description: INTERVENTIONS:  - Assess patency of urinary catheter  - If patient has a chronic marie, consider changing catheter if non-functioning  - Follow guidelines for intermittent irrigation of non-functioning urinary catheter  Outcome: Progressing     Problem: METABOLIC, FLUID AND ELECTROLYTES - ADULT  Goal: Electrolytes maintained within normal limits  Description: INTERVENTIONS:  - Monitor labs and assess patient for signs and symptoms of electrolyte imbalances  - Administer electrolyte replacement as ordered  - Monitor response to electrolyte replacements, including repeat lab results as appropriate  - Instruct patient on fluid and nutrition as appropriate  Outcome: Progressing  Goal: Fluid balance maintained  Description: INTERVENTIONS:  - Monitor labs   - Monitor I/O and WT  - Instruct patient on fluid and nutrition as appropriate  - Assess for signs & symptoms of volume excess or deficit  Outcome: Progressing  Goal: Glucose maintained within target range  Description: INTERVENTIONS:  - Monitor Blood Glucose as ordered  - Assess for signs and symptoms of hyperglycemia and hypoglycemia  - Administer ordered medications to maintain glucose  within target range  - Assess nutritional intake and initiate nutrition service referral as needed  Outcome: Progressing     Problem: HEMATOLOGIC - ADULT  Goal: Maintains hematologic stability  Description: INTERVENTIONS  - Assess for signs and symptoms of bleeding or hemorrhage  - Monitor labs  - Administer supportive blood products/factors as ordered and appropriate  Outcome: Progressing     Problem: MUSCULOSKELETAL - ADULT  Goal: Maintain or return mobility to safest level of function  Description: INTERVENTIONS:  - Assess patient's ability to carry out ADLs; assess patient's baseline for ADL function and identify physical deficits which impact ability to perform ADLs (bathing, care of mouth/teeth, toileting, grooming, dressing, etc.)  - Assess/evaluate cause of self-care deficits   - Assess range of motion  - Assess patient's mobility  - Assess patient's need for assistive devices and provide as appropriate  - Encourage maximum independence but intervene and supervise when necessary  - Involve family in performance of ADLs  - Assess for home care needs following discharge   - Consider OT consult to assist with ADL evaluation and planning for discharge  - Provide patient education as appropriate  Outcome: Progressing     Problem: COPING  Goal: Pt/Family able to verbalize concerns and demonstrate effective coping strategies  Description: INTERVENTIONS:  - Assist patient/family to identify coping skills, available support systems and cultural and spiritual values  - Provide emotional support, including active listening and acknowledgement of concerns of patient and caregivers  - Reduce environmental stimuli, as able  - Provide patient education  - Assess for spiritual pain/suffering and initiate spiritual care, including notification of Pastoral Care or natalia based community as needed  - Assess effectiveness of coping strategies  Outcome: Progressing  Goal: Will report anxiety at manageable levels  Description:  INTERVENTIONS:  - Administer medication as ordered  - Teach and encourage coping skills  - Provide emotional support  - Assess patient/family for anxiety and ability to cope  Outcome: Progressing     Problem: BEHAVIOR  Goal: Pt/Family maintain appropriate behavior and adhere to behavioral management agreement, if implemented  Description: INTERVENTIONS:  - Assess the family dynamic   - Encourage verbalization of thoughts and concerns in a socially appropriate manner  - Assess patient/family's coping skills and non-compliant behavior (including use of illegal substances).  - Utilize positive, consistent limit setting strategies supporting safety of patient, staff and others  - Initiate consult with Case Management, Spiritual Care or other ancillary services as appropriate  - If a patient's/visitor's behavior jeopardizes the safety of the patient, staff, or others, refer to organization procedure.   - Notify Security of behavior or suspected illegal substances which indicate the need for search of the patient and/or belongings  - Encourage participation in the decision making process about a behavioral management agreement; implement if patient meets criteria  Outcome: Progressing     Problem: Potential for Falls  Goal: Patient will remain free of falls  Description: INTERVENTIONS:  - Educate patient/family on patient safety including physical limitations  - Instruct patient to call for assistance with activity   - Consult OT/PT to assist with strengthening/mobility   - Keep Call bell within reach  - Keep bed low and locked with side rails adjusted as appropriate  - Keep care items and personal belongings within reach  - Initiate and maintain comfort rounds  - Make Fall Risk Sign visible to staff  - Offer Toileting every 2 Hours, in advance of need  - Initiate/Maintain bed alarm  - Obtain necessary fall risk management equipment: non skid footwear  - Apply yellow socks and bracelet for high fall risk patients  -  Consider moving patient to room near nurses station  Outcome: Progressing

## 2024-03-25 NOTE — PROGRESS NOTES
Spiritual Care Progress Note    3/25/2024  Patient: Carla Hunt : 1965  Admission Date & Time: 1/10/2024 1941  MRN: [id number] [facility]: [id number]         attempted to follow-up with pt's family from last week; no family visibly present at this time.    Chaplains still remain available.       24 1500   Clinical Encounter Type   Visited With Patient not available

## 2024-03-25 NOTE — PHYSICAL THERAPY NOTE
PT Treatment       03/25/24 1512   PT Last Visit   PT Visit Date 03/25/24   Note Type   Note Type Treatment   Pain Assessment   Pain Assessment Tool 0-10   Pain Score No Pain   Restrictions/Precautions   Weight Bearing Precautions Per Order No   Braces or Orthoses Other (Comment)  (b/l resting hand splints)   Other Precautions Cognitive;Limb alert;Multiple lines;Telemetry;O2;Fall Risk;Airborne/isolation;Contact/isolation   General   Chart Reviewed Yes   Family/Caregiver Present Yes   Cognition   Overall Cognitive Status Impaired   Arousal/Participation Cooperative;Responsive   Attention WILL   Orientation Level Unable to assess   Memory Unable to assess   Following Commands Follows one step commands inconsistently   Comments provide head nods when asked a question; occasionally smiled   Subjective   Subjective Pt agreeable to PT   Bed Mobility   Supine to Sit 2  Maximal assistance   Additional items Assist x 2;HOB elevated;LE management   Sit to Supine 2  Maximal assistance   Additional items Assist x 2;LE management   Transfers   Sit to Stand 2  Maximal assistance   Additional items Assist x 3;Verbal cues   Stand to Sit 2  Maximal assistance   Additional items Assist x 3;Verbal cues   Additional Comments w/ HHA x 2 and b/l knee blocking, +1 to help at ischial tuberosity in order to stand up through full ROM; pt tolerated 20 seconds x 2 on standing w/ max assist x 3   Ambulation/Elevation   Gait pattern Not appropriate   Balance   Static Sitting Poor   Dynamic Sitting Poor -   Static Standing Poor -   Endurance Deficit   Endurance Deficit Yes   Endurance Deficit Description weakness and fatigue   Activity Tolerance   Activity Tolerance Patient limited by fatigue   Medical Staff Made Aware OT   Nurse Made Aware yes   Exercises   Hip Flexion Sitting;5 reps;AROM;Bilateral   Balance training  5 mins of static sitting w/ max assist x 1   Assessment   Prognosis Fair   Problem List Decreased strength;Decreased range of  motion;Decreased endurance;Impaired balance;Decreased mobility;Decreased cognition;Decreased coordination;Impaired judgement;Decreased safety awareness;Decreased skin integrity;Pain   Assessment Pt agreeable to PT session. PT session focused on bed mobility, transfers, standing tolerance,  and sitting balance. Pt did tolerate treatment well today. Pt was able to perform bed mobility w/ max assist x 2 (trunk support and LE management provided). Pt was able to perform 2 sit to stands w/ max assist x 3 (HHA x 2 and b/l knee blocking, +1 from behind to support ischial tuberosities). Pt was able to get through full ROM and tolerate standing for 20 seconds. Pt tolerated 5 mins of static sitting balance at EOB w/ max assist x 1. Vitals remained stable throughout treatment and no issues reports by the patient. Pt would continue to benefit from skilled PT. Pt will continue to be seen upon admission. Pt's prognosis is Fair secondary to age, PLOF, and PMH. The patient's [facility] Basic Mobility Inpatient Standardized Score is less than 42.9, suggesting this patient may benefit from discharge to post-acute rehabilitation services. Please also refer to the recommendation of the Physical Therapist for safe discharge planning.   Goals   Patient Goals to stand up   LTG Expiration Date 04/02/24   Plan   Treatment/Interventions Functional transfer training;LE strengthening/ROM;Therapeutic exercise;Endurance training;Cognitive reorientation;Patient/family training;Equipment eval/education;Bed mobility;Gait training;Spoke to nursing;OT   Progress Progressing toward goals   PT Frequency 2-3x/wk   Discharge Recommendation   Rehab Resource Intensity Level, PT I (Maximum Resource Intensity)   AM-PAC Basic Mobility Inpatient   Turning in Flat Bed Without Bedrails 1   Lying on Back to Sitting on Edge of Flat Bed Without Bedrails 1   Moving Bed to Chair 1   Standing Up From Chair Using Arms 1   Walk in Room 1   Climb 3-5 Stairs With Railing 1    Basic Mobility Inpatient Raw Score 6   Turning Head Towards Sound 2   Follow Simple Instructions 2   Low Function Basic Mobility Raw Score  10   Low Function Basic Mobility Standardized Score  14.65   [facility] Highest Level Of Mobility   -HLM Goal 2: Bed activities/Dependent transfer   -HLM Achieved 3: Sit at edge of bed  (stand 2x (under 30 seconds each))     Karlene Mata, SPT

## 2024-03-25 NOTE — PROGRESS NOTES
NEPHROLOGY PROGRESS NOTE   Carla Hunt 59 y.o. female MRN: [id number]  Unit/Bed#: MICU 12 Encounter: [id number]      ASSESSMENT & PLAN    Acute kidney injury likely component of ATN and Bactrim use with impaired creatinine secretion  Creatinine now 0.99 and stable  Analysis from March 4 shows 4-10 WBCs  Hypernatremia  Unfortunately sodium now rising up to 156  Can uptitrate D5W, discussed with ICU team and we were in agreement, remains on tube feeds  Metabolic acidosis  Acid-base status improved no anion gap  Stomal site bleeding with bedside procedure  Chronic anemia status post PRBC transfusion  B-cell lymphoma previously on rituximab  Encephalopathy with recent subarachnoid hemorrhage and history of a seizure disorder  Hypoxic respiratory failure currently on a trach collar    SUBJECTIVE:    No acute distress, no acute events overnight    Medications:    Current Facility-Administered Medications:     acetaminophen (TYLENOL) tablet 650 mg, 650 mg, Oral, Q6H PRN, Moises Bowden MD    chlorhexidine (PERIDEX) 0.12 % oral rinse 15 mL, 15 mL, Mouth/Throat, Q12H SARTHAK, Moises Bowden MD, 15 mL at 03/25/24 0843    dextrose 5 % infusion, 100 mL/hr, Intravenous, Continuous, Ammar Alam, DO, Last Rate: 100 mL/hr at 03/25/24 0800, 100 mL/hr at 03/25/24 0800    fentaNYL injection 50 mcg, 50 mcg, Intravenous, Q1H PRN, Moises Bowden MD, 50 mcg at 03/21/24 1344    insulin regular (HumuLIN R,NovoLIN R) 1 Units/mL in sodium chloride 0.9 % 100 mL infusion, 0.3-21 Units/hr, Intravenous, Titrated, Ammar Alam, DO, Last Rate: 4 mL/hr at 03/25/24 1006, 4 Units/hr at 03/25/24 1006    ipratropium (ATROVENT) 0.02 % inhalation solution 0.5 mg, 0.5 mg, Nebulization, TID, Moises Bowden MD, 0.5 mg at 03/25/24 0754    lamoTRIgine (LaMICtal) tablet 150 mg, 150 mg, Oral, BID, Moises Bowden MD, 150 mg at 03/25/24 0843    levalbuterol (XOPENEX) inhalation solution 1.25 mg, 1.25 mg, Nebulization, TID, Moises Bowden MD, 1.25 mg at 03/25/24 0754    [COMPLETED]  methylPREDNISolone sodium succinate (Solu-MEDROL) injection 50 mg, 50 mg, Intravenous, Q6H SARTHAK, 50 mg at 03/16/24 1830 **FOLLOWED BY** [COMPLETED] methylPREDNISolone sodium succinate (Solu-MEDROL) injection 40 mg, 40 mg, Intravenous, Q6H SARTHAK, 40 mg at 03/19/24 1756 **FOLLOWED BY** [COMPLETED] methylPREDNISolone sodium succinate (Solu-MEDROL) injection 30 mg, 30 mg, Intravenous, Q8H SARTHAK, 30 mg at 03/21/24 2246 **FOLLOWED BY** [DISCONTINUED] methylPREDNISolone sodium succinate (Solu-MEDROL) injection 20 mg, 20 mg, Intravenous, Q6H SARTHAK **FOLLOWED BY** [DISCONTINUED] methylPREDNISolone sodium succinate (Solu-MEDROL) injection 10 mg, 10 mg, Intravenous, Q6H SARTHAK **FOLLOWED BY** [DISCONTINUED] methylPREDNISolone sodium succinate (Solu-MEDROL) injection 10 mg, 10 mg, Intravenous, Q8H SARTHAK **FOLLOWED BY** [DISCONTINUED] methylPREDNISolone sodium succinate (Solu-MEDROL) injection 10 mg, 10 mg, Intravenous, Q12H SARTHAK **FOLLOWED BY** [DISCONTINUED] methylPREDNISolone sodium succinate (Solu-MEDROL) injection 10 mg, 10 mg, Intravenous, Daily **FOLLOWED BY** [COMPLETED] methylPREDNISolone sodium succinate (Solu-MEDROL) injection 30 mg, 30 mg, Intravenous, Q12H SARTHAK, 30 mg at 03/24/24 2203 **FOLLOWED BY** methylPREDNISolone sodium succinate (Solu-MEDROL) injection 20 mg, 20 mg, Intravenous, Q12H SARTHAK, 20 mg at 03/25/24 0844 **FOLLOWED BY** [START ON 3/28/2024] methylPREDNISolone sodium succinate (Solu-MEDROL) injection 10 mg, 10 mg, Intravenous, Q12H SARTHAK **FOLLOWED BY** [START ON 3/31/2024] methylPREDNISolone sodium succinate (Solu-MEDROL) injection 10 mg, 10 mg, Intravenous, Daily, Emilie Soyeon Kim, MD    minocycline (DYNACIN) tablet 200 mg, 200 mg, Per NG Tube, BID, Betzaida Sr MD, 200 mg at 03/25/24 0843    nystatin (MYCOSTATIN) powder, , Topical, BID, Moises Bowden MD, Given at 03/25/24 0844    ondansetron (ZOFRAN) injection 4 mg, 4 mg, Intravenous, Q6H PRN, Moises Bowden MD    pantoprazole (PROTONIX) injection 40 mg, 40 mg,  Intravenous, Q12H SARTHAK, Moises Bowden MD, 40 mg at 03/25/24 0843    polyethylene glycol (MIRALAX) packet 17 g, 17 g, Per NG Tube, Daily, Moises Bowden MD, 17 g at 03/25/24 0842    sodium chloride (AYR SALINE NASAL) nasal gel 1 Application, 1 Application, Nasal, Q1H PRN, Moises Bowden MD    sodium chloride (OCEAN) 0.65 % nasal spray 2 spray, 2 spray, Each Nare, Q4H, Moises Bowden MD, 2 spray at 03/25/24 0920    sodium chloride 3 % inhalation solution 4 mL, 4 mL, Nebulization, TID, Moises Bowden MD, 4 mL at 03/25/24 0754    topiramate (TOPAMAX) 6 mg/ml oral suspension 50 mg, 50 mg, Per PEG Tube, BID, Moises Bowden MD, 50 mg at 03/25/24 0842    venlafaxine (EFFEXOR) tablet 12.5 mg, 12.5 mg, Oral, TID With Meals, Moises Bowden MD, 12.5 mg at 03/25/24 0843    OBJECTIVE:    Vitals:    03/25/24 0537 03/25/24 0700 03/25/24 0800 03/25/24 0802   BP:  128/74 157/75 157/75   BP Location:  Other (Comment)     Pulse:  (!) 122 (!) 122 (!) 121   Resp:  20 19 20   Temp:   98.4 °F (36.9 °C)    TempSrc:   Oral    SpO2:  96% 96% 96%   Weight: 79 kg (174 lb 2.6 oz)      Height:            Temp:  [98.4 °F (36.9 °C)-99.8 °F (37.7 °C)] 98.4 °F (36.9 °C)  HR:  [119-146] 121  Resp:  [15-29] 20  BP: (101-157)/(60-78) 157/75  SpO2:  [94 %-99 %] 96 %     Body mass index is 26.48 kg/m².    Weight (last 2 days)       Date/Time Weight    03/25/24 0537 79 (174.16)            I/O last 3 completed shifts:  In: 2846.4 [I.V.:160.4; NG/GT:1300; Feedings:1386]  Out: 4710 [Urine:3235; Stool:1475]    I/O this shift:  In: 523.5 [I.V.:223.5; NG/GT:300]  Out: 450 [Urine:450]      Physical exam:    General: no acute distress  Eyes: conjunctivae pink, anicteric sclerae  ENT: lips and mucous membranes moist, no exudates, normal external ears  Neck: Trach collar clean dry and intact  Chest: No respiratory distress, no accessory muscle use  CVS: Normal rate  Abdomen: soft, non-tender, non-distended, normoactive bowel sounds  Extremities: no edema of both legs  Skin: no rash  Neuro: She  is alert, family at bedside    Invasive Devices:   Urethral Catheter (Active)   Reasons to continue Urinary Catheter  Accurate I&O assessment in critically ill patients (48 hr. max) 03/25/24 0001   Goal for Removal Voiding trial when ambulation improves 03/25/24 0001   Site Assessment Clean;Skin intact 03/25/24 0001   Ortiz Care Done 03/25/24 0900   Collection Container Standard drainage bag 03/25/24 0001   Securement Method Securing device (Describe) 03/25/24 0001   Output (mL) 300 mL 03/25/24 1000   CAUTI Time-out performed before Urine Culture Yes 03/24/24 1800     Lab Results:   Results from last 7 days   Lab Units 03/25/24  0835 03/25/24  0523 03/24/24  0437 03/23/24  0519 03/22/24  0524 03/21/24  1332 03/21/24  0850 03/21/24  0557 03/20/24  1048 03/20/24  0831 03/20/24  0457 03/19/24  0543   WBC Thousand/uL 1.20* 1.31* 1.78* 2.43* 3.89* 5.30  --   --    < >  --    < > 9.92   HEMOGLOBIN g/dL 8.8* 8.7* 10.4* 9.7* 9.8* 9.4*   < >  --    < >  --    < > 9.5*   I STAT HEMOGLOBIN g/dl  --   --   --   --   --   --   --   --   --  6.8*  --   --    HEMATOCRIT % 27.8* 27.6* 33.0* 29.9* 28.6* 27.9*   < >  --    < >  --    < > 28.1*   HEMATOCRIT, ISTAT %  --   --   --   --   --   --   --   --   --  20*  --   --    PLATELETS Thousands/uL 38* 40* 54* 68* 77* 82*  --   --    < >  --    < > 86*   POTASSIUM mmol/L  --  4.3 4.4 4.8 4.0 4.4  --   --    < >  --    < > 4.7   CHLORIDE mmol/L  --  124* 119* 116* 113* 111*  --   --    < >  --    < > 107   CO2 mmol/L  --  23 23 21 20* 19*  --   --    < >  --    < > 17*   CO2, I-STAT mmol/L  --   --   --   --   --   --   --   --   --  15*  --   --    BUN mg/dL  --  72* 68* 75* 77* 77*  --   --    < >  --    < > 85*   CREATININE mg/dL  --  0.89 0.99 1.12 1.33* 1.38*  --   --    < >  --    < > 1.79*   CALCIUM mg/dL  --  9.4 9.2 8.8 8.8 8.3*  --   --    < >  --    < > 8.4   MAGNESIUM mg/dL  --  2.4 2.3 2.5 2.3  --   --  2.2  --   --    < > 2.3   PHOSPHORUS mg/dL  --  3.7 3.8 4.8* 5.9*  --   " --  5.4*  --   --    < > 4.0   ALK PHOS U/L  --   --  115* 98  --   --   --   --   --   --   --  141*   ALT U/L  --   --  41 40  --   --   --   --   --   --   --  64*   AST U/L  --   --  13 21  --   --   --   --   --   --   --  29   GLUCOSE, ISTAT mg/dl  --   --   --   --   --   --   --   --   --  118  --   --     < > = values in this interval not displayed.         Portions of the record may have been created with voice recognition software. Occasional wrong word or \"sound a like\" substitutions may have occurred due to the inherent limitations of voice recognition software. Read the chart carefully and recognize, using context, where substitutions have occurred.If you have any questions, please contact the dictating provider.    "

## 2024-03-25 NOTE — RESTORATIVE TECHNICIAN NOTE
Restorative Technician Note      Patient Name: Carla Hunt     Restorative Tech Visit Date: 03/25/24  Note Type: Mobility  Patient Position Upon Consult: Supine  Activity Performed: Other (Comment) (Tilted incrementally up to 50 Degrees)  Assistive Device: Other (Comment) (None)  Education Provided: Yes; Family or social support of family present for education by provider  Patient Position at End of Consult: Supine; All needs within reach; Bed/Chair alarm activated    Marixa العراقي Restorative Tech

## 2024-03-26 LAB
ANION GAP SERPL CALCULATED.3IONS-SCNC: 11 MMOL/L (ref 4–13)
ANION GAP SERPL CALCULATED.3IONS-SCNC: 12 MMOL/L (ref 4–13)
ANION GAP SERPL CALCULATED.3IONS-SCNC: 7 MMOL/L (ref 4–13)
ANION GAP SERPL CALCULATED.3IONS-SCNC: 9 MMOL/L (ref 4–13)
ANISOCYTOSIS BLD QL SMEAR: PRESENT
BASOPHILS # BLD MANUAL: 0 THOUSAND/UL (ref 0–0.1)
BASOPHILS NFR MAR MANUAL: 0 % (ref 0–1)
BUN SERPL-MCNC: 54 MG/DL (ref 5–25)
BUN SERPL-MCNC: 57 MG/DL (ref 5–25)
BUN SERPL-MCNC: 63 MG/DL (ref 5–25)
BUN SERPL-MCNC: 70 MG/DL (ref 5–25)
CALCIUM SERPL-MCNC: 8.5 MG/DL (ref 8.4–10.2)
CALCIUM SERPL-MCNC: 8.9 MG/DL (ref 8.4–10.2)
CALCIUM SERPL-MCNC: 9.1 MG/DL (ref 8.4–10.2)
CALCIUM SERPL-MCNC: 9.1 MG/DL (ref 8.4–10.2)
CHLORIDE SERPL-SCNC: 115 MMOL/L (ref 96–108)
CHLORIDE SERPL-SCNC: 115 MMOL/L (ref 96–108)
CHLORIDE SERPL-SCNC: 120 MMOL/L (ref 96–108)
CHLORIDE SERPL-SCNC: 121 MMOL/L (ref 96–108)
CO2 SERPL-SCNC: 19 MMOL/L (ref 21–32)
CO2 SERPL-SCNC: 20 MMOL/L (ref 21–32)
CREAT SERPL-MCNC: 0.79 MG/DL (ref 0.6–1.3)
CREAT SERPL-MCNC: 0.8 MG/DL (ref 0.6–1.3)
CREAT SERPL-MCNC: 0.81 MG/DL (ref 0.6–1.3)
CREAT SERPL-MCNC: 0.81 MG/DL (ref 0.6–1.3)
DACRYOCYTES BLD QL SMEAR: PRESENT
DOHLE BOD BLD QL SMEAR: PRESENT
EOSINOPHIL # BLD MANUAL: 0 THOUSAND/UL (ref 0–0.4)
EOSINOPHIL NFR BLD MANUAL: 0 % (ref 0–6)
ERYTHROCYTE [DISTWIDTH] IN BLOOD BY AUTOMATED COUNT: 17.9 % (ref 11.6–15.1)
GFR SERPL CREATININE-BSD FRML MDRD: 80 ML/MIN/1.73SQ M
GFR SERPL CREATININE-BSD FRML MDRD: 80 ML/MIN/1.73SQ M
GFR SERPL CREATININE-BSD FRML MDRD: 81 ML/MIN/1.73SQ M
GFR SERPL CREATININE-BSD FRML MDRD: 82 ML/MIN/1.73SQ M
GIANT PLATELETS BLD QL SMEAR: PRESENT
GLUCOSE SERPL-MCNC: 116 MG/DL (ref 65–140)
GLUCOSE SERPL-MCNC: 132 MG/DL (ref 65–140)
GLUCOSE SERPL-MCNC: 140 MG/DL (ref 65–140)
GLUCOSE SERPL-MCNC: 142 MG/DL (ref 65–140)
GLUCOSE SERPL-MCNC: 158 MG/DL (ref 65–140)
GLUCOSE SERPL-MCNC: 163 MG/DL (ref 65–140)
GLUCOSE SERPL-MCNC: 179 MG/DL (ref 65–140)
GLUCOSE SERPL-MCNC: 179 MG/DL (ref 65–140)
GLUCOSE SERPL-MCNC: 186 MG/DL (ref 65–140)
GLUCOSE SERPL-MCNC: 202 MG/DL (ref 65–140)
GLUCOSE SERPL-MCNC: 216 MG/DL (ref 65–140)
GLUCOSE SERPL-MCNC: 223 MG/DL (ref 65–140)
HAPTOGLOB SERPL-MCNC: 414 MG/DL (ref 33–346)
HCT VFR BLD AUTO: 26.8 % (ref 34.8–46.1)
HGB BLD-MCNC: 8.3 G/DL (ref 11.5–15.4)
LDH SERPL-CCNC: 235 U/L (ref 140–271)
LYMPHOCYTES # BLD AUTO: 0.02 THOUSAND/UL (ref 0.6–4.47)
LYMPHOCYTES # BLD AUTO: 2 % (ref 14–44)
MAGNESIUM SERPL-MCNC: 2.1 MG/DL (ref 1.9–2.7)
MCH RBC QN AUTO: 30.5 PG (ref 26.8–34.3)
MCHC RBC AUTO-ENTMCNC: 31 G/DL (ref 31.4–37.4)
MCV RBC AUTO: 99 FL (ref 82–98)
METAMYELOCYTES NFR BLD MANUAL: 6 % (ref 0–1)
MONOCYTES # BLD AUTO: 0.01 THOUSAND/UL (ref 0–1.22)
MONOCYTES NFR BLD: 1 % (ref 4–12)
MYCOBACTERIUM SPEC CULT: NORMAL
MYCOBACTERIUM SPEC CULT: NORMAL
NEUTROPHILS # BLD MANUAL: 0.82 THOUSAND/UL (ref 1.85–7.62)
NEUTS BAND NFR BLD MANUAL: 32 % (ref 0–8)
NEUTS SEG NFR BLD AUTO: 59 % (ref 43–75)
NRBC BLD AUTO-RTO: 1 /100 WBC (ref 0–2)
PHOSPHATE SERPL-MCNC: 3.9 MG/DL (ref 2.7–4.5)
PLATELET # BLD AUTO: 27 THOUSANDS/UL (ref 149–390)
PLATELET BLD QL SMEAR: ABNORMAL
PMV BLD AUTO: 12.7 FL (ref 8.9–12.7)
POIKILOCYTOSIS BLD QL SMEAR: PRESENT
POTASSIUM SERPL-SCNC: 3.8 MMOL/L (ref 3.5–5.3)
POTASSIUM SERPL-SCNC: 3.8 MMOL/L (ref 3.5–5.3)
POTASSIUM SERPL-SCNC: 4.2 MMOL/L (ref 3.5–5.3)
POTASSIUM SERPL-SCNC: 4.7 MMOL/L (ref 3.5–5.3)
RBC # BLD AUTO: 2.72 MILLION/UL (ref 3.81–5.12)
RBC MORPH BLD: PRESENT
RHODAMINE-AURAMINE STN SPEC: NORMAL
SARS-COV-2 RNA SPEC QL NAA+PROBE: NORMAL
SODIUM SERPL-SCNC: 142 MMOL/L (ref 135–147)
SODIUM SERPL-SCNC: 144 MMOL/L (ref 135–147)
SODIUM SERPL-SCNC: 151 MMOL/L (ref 135–147)
SODIUM SERPL-SCNC: 152 MMOL/L (ref 135–147)
TOXIC GRANULES BLD QL SMEAR: PRESENT
WBC # BLD AUTO: 0.9 THOUSAND/UL (ref 4.31–10.16)

## 2024-03-26 PROCEDURE — 94760 N-INVAS EAR/PLS OXIMETRY 1: CPT | Performed by: SOCIAL WORKER

## 2024-03-26 PROCEDURE — 94760 N-INVAS EAR/PLS OXIMETRY 1: CPT

## 2024-03-26 PROCEDURE — 30233R1 TRANSFUSION OF NONAUTOLOGOUS PLATELETS INTO PERIPHERAL VEIN, PERCUTANEOUS APPROACH: ICD-10-PCS | Performed by: STUDENT IN AN ORGANIZED HEALTH CARE EDUCATION/TRAINING PROGRAM

## 2024-03-26 PROCEDURE — C9113 INJ PANTOPRAZOLE SODIUM, VIA: HCPCS | Performed by: INTERNAL MEDICINE

## 2024-03-26 PROCEDURE — NC001 PR NO CHARGE: Performed by: INTERNAL MEDICINE

## 2024-03-26 PROCEDURE — 94640 AIRWAY INHALATION TREATMENT: CPT | Performed by: SOCIAL WORKER

## 2024-03-26 PROCEDURE — P9037 PLATE PHERES LEUKOREDU IRRAD: HCPCS

## 2024-03-26 PROCEDURE — 83735 ASSAY OF MAGNESIUM: CPT | Performed by: STUDENT IN AN ORGANIZED HEALTH CARE EDUCATION/TRAINING PROGRAM

## 2024-03-26 PROCEDURE — 92526 ORAL FUNCTION THERAPY: CPT

## 2024-03-26 PROCEDURE — 82948 REAGENT STRIP/BLOOD GLUCOSE: CPT

## 2024-03-26 PROCEDURE — NC001 PR NO CHARGE: Performed by: STUDENT IN AN ORGANIZED HEALTH CARE EDUCATION/TRAINING PROGRAM

## 2024-03-26 PROCEDURE — 94640 AIRWAY INHALATION TREATMENT: CPT

## 2024-03-26 PROCEDURE — 84100 ASSAY OF PHOSPHORUS: CPT

## 2024-03-26 PROCEDURE — 80048 BASIC METABOLIC PNL TOTAL CA: CPT

## 2024-03-26 PROCEDURE — 94664 DEMO&/EVAL PT USE INHALER: CPT | Performed by: SOCIAL WORKER

## 2024-03-26 PROCEDURE — 83615 LACTATE (LD) (LDH) ENZYME: CPT

## 2024-03-26 PROCEDURE — 85027 COMPLETE CBC AUTOMATED: CPT

## 2024-03-26 PROCEDURE — 99232 SBSQ HOSP IP/OBS MODERATE 35: CPT | Performed by: PHYSICIAN ASSISTANT

## 2024-03-26 PROCEDURE — 85007 BL SMEAR W/DIFF WBC COUNT: CPT

## 2024-03-26 PROCEDURE — 99233 SBSQ HOSP IP/OBS HIGH 50: CPT | Performed by: STUDENT IN AN ORGANIZED HEALTH CARE EDUCATION/TRAINING PROGRAM

## 2024-03-26 RX ORDER — DEXTROSE MONOHYDRATE 50 MG/ML
100 INJECTION, SOLUTION INTRAVENOUS CONTINUOUS
Status: DISPENSED | OUTPATIENT
Start: 2024-03-26 | End: 2024-03-26

## 2024-03-26 RX ORDER — INSULIN GLARGINE 100 [IU]/ML
10 INJECTION, SOLUTION SUBCUTANEOUS ONCE
Status: COMPLETED | OUTPATIENT
Start: 2024-03-26 | End: 2024-03-26

## 2024-03-26 RX ORDER — INSULIN LISPRO 100 [IU]/ML
1-5 INJECTION, SOLUTION INTRAVENOUS; SUBCUTANEOUS 4 TIMES DAILY
Status: DISCONTINUED | OUTPATIENT
Start: 2024-03-26 | End: 2024-03-28

## 2024-03-26 RX ORDER — INSULIN LISPRO 100 [IU]/ML
1-5 INJECTION, SOLUTION INTRAVENOUS; SUBCUTANEOUS
Status: DISCONTINUED | OUTPATIENT
Start: 2024-03-26 | End: 2024-03-26

## 2024-03-26 RX ORDER — INSULIN GLARGINE 100 [IU]/ML
10 INJECTION, SOLUTION SUBCUTANEOUS
Status: DISCONTINUED | OUTPATIENT
Start: 2024-03-26 | End: 2024-03-26

## 2024-03-26 RX ORDER — METHYLPREDNISOLONE SODIUM SUCCINATE 40 MG/ML
30 INJECTION, POWDER, LYOPHILIZED, FOR SOLUTION INTRAMUSCULAR; INTRAVENOUS 2 TIMES DAILY
Status: DISCONTINUED | OUTPATIENT
Start: 2024-03-26 | End: 2024-03-29

## 2024-03-26 RX ORDER — DEXTROSE MONOHYDRATE 50 MG/ML
125 INJECTION, SOLUTION INTRAVENOUS CONTINUOUS
Status: DISCONTINUED | OUTPATIENT
Start: 2024-03-26 | End: 2024-03-26

## 2024-03-26 RX ORDER — INSULIN GLARGINE 100 [IU]/ML
10 INJECTION, SOLUTION SUBCUTANEOUS
Status: DISCONTINUED | OUTPATIENT
Start: 2024-03-27 | End: 2024-03-26

## 2024-03-26 RX ORDER — INSULIN GLARGINE 100 [IU]/ML
10 INJECTION, SOLUTION SUBCUTANEOUS EVERY MORNING
Status: DISCONTINUED | OUTPATIENT
Start: 2024-03-26 | End: 2024-03-27

## 2024-03-26 RX ORDER — FENTANYL CITRATE 50 UG/ML
25 INJECTION, SOLUTION INTRAMUSCULAR; INTRAVENOUS
Status: DISCONTINUED | OUTPATIENT
Start: 2024-03-26 | End: 2024-04-08

## 2024-03-26 RX ADMIN — VENLAFAXINE 12.5 MG: 25 TABLET ORAL at 17:38

## 2024-03-26 RX ADMIN — LEVALBUTEROL HYDROCHLORIDE 1.25 MG: 1.25 SOLUTION RESPIRATORY (INHALATION) at 21:18

## 2024-03-26 RX ADMIN — CHLORHEXIDINE GLUCONATE 0.12% ORAL RINSE 15 ML: 1.2 LIQUID ORAL at 21:06

## 2024-03-26 RX ADMIN — LAMOTRIGINE 150 MG: 100 TABLET ORAL at 08:58

## 2024-03-26 RX ADMIN — DEXTROSE 100 ML/HR: 5 SOLUTION INTRAVENOUS at 00:25

## 2024-03-26 RX ADMIN — Medication 2 SPRAY: at 21:17

## 2024-03-26 RX ADMIN — DEXTROSE 125 ML/HR: 5 SOLUTION INTRAVENOUS at 07:37

## 2024-03-26 RX ADMIN — LEVALBUTEROL HYDROCHLORIDE 1.25 MG: 1.25 SOLUTION RESPIRATORY (INHALATION) at 13:12

## 2024-03-26 RX ADMIN — METHYLPREDNISOLONE SODIUM SUCCINATE 30 MG: 40 INJECTION, POWDER, FOR SOLUTION INTRAMUSCULAR; INTRAVENOUS at 21:03

## 2024-03-26 RX ADMIN — LAMOTRIGINE 150 MG: 100 TABLET ORAL at 17:41

## 2024-03-26 RX ADMIN — ACETAMINOPHEN 650 MG: 325 TABLET, FILM COATED ORAL at 08:58

## 2024-03-26 RX ADMIN — Medication 2 SPRAY: at 14:44

## 2024-03-26 RX ADMIN — NYSTATIN: 100000 POWDER TOPICAL at 17:40

## 2024-03-26 RX ADMIN — Medication 2 SPRAY: at 02:18

## 2024-03-26 RX ADMIN — INSULIN LISPRO 1 UNITS: 100 INJECTION, SOLUTION INTRAVENOUS; SUBCUTANEOUS at 21:14

## 2024-03-26 RX ADMIN — SODIUM CHLORIDE SOLN NEBU 3% 4 ML: 3 NEBU SOLN at 07:17

## 2024-03-26 RX ADMIN — TOPIRAMATE 50 MG: 100 TABLET, FILM COATED ORAL at 17:38

## 2024-03-26 RX ADMIN — Medication 2 SPRAY: at 05:40

## 2024-03-26 RX ADMIN — INSULIN LISPRO 2 UNITS: 100 INJECTION, SOLUTION INTRAVENOUS; SUBCUTANEOUS at 13:17

## 2024-03-26 RX ADMIN — SODIUM CHLORIDE 4 UNITS/HR: 9 INJECTION, SOLUTION INTRAVENOUS at 05:54

## 2024-03-26 RX ADMIN — SODIUM CHLORIDE SOLN NEBU 3% 4 ML: 3 NEBU SOLN at 13:13

## 2024-03-26 RX ADMIN — SODIUM CHLORIDE SOLN NEBU 3% 4 ML: 3 NEBU SOLN at 21:18

## 2024-03-26 RX ADMIN — INSULIN LISPRO 1 UNITS: 100 INJECTION, SOLUTION INTRAVENOUS; SUBCUTANEOUS at 17:42

## 2024-03-26 RX ADMIN — CHLORHEXIDINE GLUCONATE 0.12% ORAL RINSE 15 ML: 1.2 LIQUID ORAL at 08:57

## 2024-03-26 RX ADMIN — VENLAFAXINE 12.5 MG: 25 TABLET ORAL at 08:34

## 2024-03-26 RX ADMIN — POLYETHYLENE GLYCOL 3350 17 G: 17 POWDER, FOR SOLUTION ORAL at 08:59

## 2024-03-26 RX ADMIN — METHYLPREDNISOLONE SODIUM SUCCINATE 20 MG: 40 INJECTION, POWDER, FOR SOLUTION INTRAMUSCULAR; INTRAVENOUS at 08:57

## 2024-03-26 RX ADMIN — IPRATROPIUM BROMIDE 0.5 MG: 0.5 SOLUTION RESPIRATORY (INHALATION) at 13:13

## 2024-03-26 RX ADMIN — TOPIRAMATE 50 MG: 100 TABLET, FILM COATED ORAL at 08:59

## 2024-03-26 RX ADMIN — Medication 2 SPRAY: at 17:38

## 2024-03-26 RX ADMIN — VENLAFAXINE 12.5 MG: 25 TABLET ORAL at 13:23

## 2024-03-26 RX ADMIN — LEVALBUTEROL HYDROCHLORIDE 1.25 MG: 1.25 SOLUTION RESPIRATORY (INHALATION) at 07:17

## 2024-03-26 RX ADMIN — PANTOPRAZOLE SODIUM 40 MG: 40 INJECTION, POWDER, FOR SOLUTION INTRAVENOUS at 21:03

## 2024-03-26 RX ADMIN — IPRATROPIUM BROMIDE 0.5 MG: 0.5 SOLUTION RESPIRATORY (INHALATION) at 07:17

## 2024-03-26 RX ADMIN — IPRATROPIUM BROMIDE 0.5 MG: 0.5 SOLUTION RESPIRATORY (INHALATION) at 21:18

## 2024-03-26 RX ADMIN — PANTOPRAZOLE SODIUM 40 MG: 40 INJECTION, POWDER, FOR SOLUTION INTRAVENOUS at 08:59

## 2024-03-26 RX ADMIN — MINOCYCLINE HYDROCHLORIDE 200 MG: 50 TABLET, FILM COATED ORAL at 08:59

## 2024-03-26 RX ADMIN — INSULIN GLARGINE 10 UNITS: 100 INJECTION, SOLUTION SUBCUTANEOUS at 13:16

## 2024-03-26 RX ADMIN — NYSTATIN: 100000 POWDER TOPICAL at 09:00

## 2024-03-26 RX ADMIN — MINOCYCLINE HYDROCHLORIDE 200 MG: 50 TABLET, FILM COATED ORAL at 18:25

## 2024-03-26 RX ADMIN — Medication 2 SPRAY: at 13:18

## 2024-03-26 NOTE — SPEECH THERAPY NOTE
Speech-Language Pathology Progress Note    Patient Name: Carla Hunt    Today's Date: 3/26/2024    Subjective:  Pt was lethargic and responsive to verbal cueing . She was sitting upright in bed.  present at bedside.    Objective:  Pt was seen today for dysphagia therapy. Pr is currently NPO with NGT feeds. PMV in place (#8.0 cuffed trach, cuff deflated), with stable vitals and easy breathing. Pt was on trach collar. Oral care was completed with use of oral care kits. Focus of today's session was  to provide PO trials for activation/engagement of the swallowing muscles while minimizing aspiration . Textures offered today included ice chips via tsp x2 and honey thick liquid via tsp x2.  Swallow function:   Bolus retrieval and manipulation were reduced today ? D/t lethargy.  AP transfer was slow and delayed. Pharyngeal swallow initiation was delayed, with repeated swallow attempts following first swallow response. Hyolaryngeal excursion was reduced and ? incomplete. Cough (immediate and/or delayed) occurred with all trials.    Assessment:  Swallow function remains a concern. Able to tolerate PMV for improved breathing pattern however she remains aphonic with nearly unintelligible whisper voicing. NGT remains for nutrition/meds. Negative covid test yesterday, if negative tomorrow will schdule MBS.    Plan:  Continue NPO now with frequent oral care, NGT for nutrition. Continue ST follow up. Subsequent sessions to focus on maximizing PO intake safety and completion of MBS possibly tomorrow. .

## 2024-03-26 NOTE — QUICK NOTE
Patient's labs are reviewed specifically for patient's hyponatremia    Hypernatremia  Sodium level is now 142 corrected for glucose closer to 144  Can hold further D5 and maintain on free water flushes

## 2024-03-26 NOTE — PROGRESS NOTES
[facility]  Progress Note: Critical Care  Name: Carla Hunt 59 y.o. female I MRN: [id number]  Unit/Bed#: MICU 12 I Date of Admission: 1/10/2024   Date of Service: 3/26/2024 I Hospital Day: 76    Assessment/Plan   Acute hypoxic respiratory failure;; s/p tracheostomy 3/12  Bilateral ground glass opacities, persistent, improving  Persistent COVID-19 infection; s/p 14d course of antivirals completed 3/15, superimposed by #4  Stenotrophomonas pneumonia; recurrent, previously on Bactrim, switched to minocycline 2/2 ELIESER, no resolved, will continue ABX through 3/27  Pancytopenia- multifactorial including hx b-cell lymphoma, persistent inflammation  Acute on chronic anemia- multifactorial-intermittent blood loss from ostomy site, chronic inflammation, iatrogenic, marrow dysfunction in setting of persistent inflammation   Lymphopenia with bandemia, on high dose steroids  Severe Metabolic encephalopathy, multifactorial including ?uremia, improving  Uremia, improving  Hypernatremia  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; complicated by poor wound healing  Wound dehiscence 2/2 poor wound healing s/p wound vac 3/13  B-Cell lymphoma, s/p chemotheraphy 2022, Rituxan maintenance therapy on hold  Minimal SAH POA, no interventions required, resolved on repeat CTHS  Seizure disorder; on home AEDs  Steroid induced hyperglycemia; controlled on insulin gtt  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  -Most recent repeat CTH 3/13 negative for acute intracranial findings.  Has had extensive neurological workup including MRI/CT neuroimaging, LP with unremarkable findings  -Etiology  unclear. Has been off sedation. Electrolytes, sodium, BS at goal. Ammonia normal. May be prolonged 2/2 PNA, possibly worsened by uremic encephalopathy worsened by ELIESER, ?UGIB, however GFR not <15; candida growth seen on BAL Cx from 3/11, may be respiraotry colonization, however fungal infection not ruled in setting of severe lymphopenia, depressed immunoglobulins.      Plan:  -Avoid PRN fentanyl/sedative meds as able.  -D/C modafinil due to clinical improvement in overall alertness, persistent tachycardia, anxiety  -Slow steroid wean: currently 20 BID day 2/7.   -ABX tx with PNA as below  -Frequent neurochecks   -Defer MRI brain due to clinical improvement, not likely to      Diagnosis: seizure disorder, known history  -S/p partial left temporal lobe resection in 2007 2/2 ?complex migraines  -On Lamictal 150 bid and Topamax 50mg bid,   -Last lamotrigine level from 03/02 at 10.7 (therapeutic)  -Will consider alternative to Lamictal as it may be contributing to marrow suppression      Diagnosis: Anxiety, known history  -On Effexor 12.5 mg TID     CV:  -Diagnosis: Sinus tachycardia  -In setting of blood loss anemia/hypovolemia, improving with PRBC transfusions and IVF  -TTE 3/17 with no major structural dysfunction  -May be 2/2 dehydration/hypvol, acute on chronic anemia, underlying infectious process, pain, less likely Modafinal-induced as patient was persistently tachy prior   Plan:  - See plans under Pulm, Heme/Onc, ID    Pulm:  Diagnosis: Acute hypoxic respiratory failure  Diagnosis: Bilateral ground glass opacities, persistent  -Vent dependent; s/p trach 3/12 after was reintubated 3/11 due to increased WOB,elevated CRP  -Persistently COVID+ s/p multiple courses of antivirals (most recent completed 3/15). Complicated by recurrent bacterial PNA treated w 10d levaquin (completed 2/25) for MDR PNA; most recent BAL +recurrent stenotrophomona treated with Bactrim, swithced to minocyline today 2/2  ELIESER  BAL also with 4+ candida albican; serum fungitell elevated but no s/s systemic fungal infection, may be false elevation from abx  -Etiology Likely multifactorial including possible COVID pneumonitis vs recurrent PNA vs hypersensitivity pneumonitis 2/2 ?rituxumab. Persistent inflammation likely given patient has been steroid responsive throughout admission.   -CRP increasing but likely from intraabdominal inflammation as patient is noted to have clinical improvement with weaning of steroids, ABx. Fungal source not ruled out in setting of severe lymphopenia, low immunoglobulins.  -Repeat CXR 3/22 with significant improvement of multifocal bronchopneumonia  -Repeat COVID ag test 3/25 negative     Plan:  -Finished 14d course of Paxlovid/remdesivir on 3/15  -Repeat COVID ag 48h from prior, if continues to be negative, patient can be removed from isolation  -Follow up anti-Rituximab ab  -Continue minocycline 200mg bid via NGT to tx Stenotrophomonas PNA as below  -Steroid wean- IV Solu-Medrol 20 mg bid   -Hold off on starting empiric antifungal given clinical improvement  -Continue trach collar, wean O2 as able     GI:  Diagnosis: Partial cecal volvulus s/p right hemicolectomy with ostomy pouch in place  -Complicated by poor wound healing of midline incision as evidenced by wound dehiscence s/p wound vac that was removed 3/17 by gen surg.  -Stoma with dark brown output, consistent with prior  Plan:  -Wound vac as per general surgery  -Monitor ostomy pouch output  -Surgery following, will keep them updated if any further changes     :     Diagnosis: Hypernatremia  -Improving with D5W  -FWD 1.2L  =Uptitrate D5W, repeat BMP 1400  =Free water flushes to 350cc q4h      Diagnosis: uremia  -?catabolic from steroids, may also have ?slow UGIB in setting of prolonged steroids though no overt s/s of GIB and H&H stable since 3/23 and patient is on ppi  -Trending down  -Will consider GI consultation for potential EGD inpatient  if clinical s/s UGIB     Diagnosis: CKD III,   -Baseline Cr  0.8-1.2  -Cr now stable and at baseline.   -UO adequate, on Ortiz, draining clear yellow urine  -Prerenal azotemia 2/2 uremia,   Plan:  -Trend daily BMP  -Continue to monitor I/O and UOP  -Avoid nephrotoxins, contrast dye as able  -See plan under uremia     F/E/N:  F: D5W 125cc/hr  E: monitor and replete for goal K>4, P>3, Mg>2; hypernatremic; see above  N: tube feeds with vital 1.5; 45 cc/h, Prosource liquid protein to 1 packet BID  -Failed bedside speech,  video barium swallow planned pending repeat COVID ag remains negative on 3/27     Heme/Onc:  Diagnosis: Pancytopenia  -In setting of hx of B cell lymphoma, prior chemo, Rituxan therapy, present mes including lamictal, mincocylcine  -Hemo onc consulted, plan for IR bone marrow bx to rule out primary marrow disorder  -Plan for G-CSF as per heme-onc  -Will consider alternatives to Lamictal for hx of seizures in consultation with neuro; tetracyclin may also be contributing, pt scheduled to finish minocycline course 3/27  -Trend CBC and diff daily, neutropenic precautions for ANC <500    Diagnosis: Acute on chronic anemia  -Baseline Hgb ~7-8. S/P 2U PRBCs 3/17 after repeat Hgb 5.3, total of 6U transfuse  d since admission.  -Patient had significant blood loss from ostomy site 3/20, resulting in hemorraghic shock, improved with blood transfusion; hemostasis achieved after bleeding source identified and sutured. Another large vol danie bloody output from osotomy on 3/21, bleeding source within ostomy site, sutured.    -Hgb dropping today 8.7, despite hypernatremia/being dry  -May still have slow GIB due to persistent uremia, GI previously consulted for EGD but was deferred due to hemodynamic instability at the time  -?Worsened by impaired marrow response liekly 2/2 persistent inflammation due to low retic index ~1 prior to most recent transfusions; normocytic with normal B12/folate levels; MCV<100. Iatrogenic  cause likely contributing 2/2 frequent blood draws; chronic anemia likely persistent inflammatory state/CKD/lymphoma in setting of elevated ferritin of 974. SPEP/UPEP negative. Hemolysis workup negative.   Plan:  -Routine monitoring ostomy output, if bleeding, apply direct pressure and contact gen surg STAT for hemostasis .  -Continue tube feeds/nutritional support  -Trend CBC, transfuse to maintain Hgb>7  -See plan under pancytopenia     Diagnosis: Thrombocytopenia  -PLT dropping further to 27K today, transfuse 1U today  -Persistent thrombocytopenia likely in setting of infectious state, known history of B-cell lymphoma.  May also have component of marrow dysfunction in setting of persistent inflammatory state. No objective evidence of liver dysfunction. No known history of von Willebrand's disease.  -Has been on heparin product inpatient, 4T score suggestive of intermiediate probability of HIT at 14%  Plan:  -Heparin-induced antibody pending  -Hold Lovenox for DVT ppx  -See plan under pancytopenia      Diagnosis Lymphopenia with bandemia  -In setting of high dose steroids  -Severely depressed immunoglobulins inclding IgG, IgA, IgM  -At risk for further infections  -Contact and airborne precautions    Diagnosis: B cell lymphoma  -Follows with heme[facility]onc at [facility]  -S/P 6 cycles of chemotherapy in 20222  -Maintained on Rituxan for 2 year course PTA, started May 2022, last dose was 12/2024, treatment currently on hold in setting of persistent COVID-19 infection  Plan:  -Holding rituximab in setting of persistent COVID-19 infection, resume if repeat COVID negative in consultation with heme-onc  -Follow up anti-Rituximab ab to assess for drug induced hypersensitivity syndrome     DVT ppx: hold lovenox 2/2 thrombocytopenia     Endo:  Diagnosis: steroid hyperglycemia and diabetes mellitus type 2, A1c at 6.6 from 01/2024  -Goal -180  -BG range last 24hrs 113-174  Plan:  -On insulin gtt     ID:  Diagnosis: Recurrent  bacterial pneumonia  - Patient has completed multiple treatment courses of remdesivir throughout hospitalization in addition to 7 days of ceftriaxone.  - BAL cultures in 2/2024 positive for MDR Pseudomonas and stenotrophomonas treated with 10d course of Levaquin  - CT C/A/P 3/10 with persistent groundglass opacities and changes suggestive of sequelae of COVID, prior infections.  Completed 10d course of cefepime for broad spectrum coveragge (completed (3/13)  -Repeat BAL cultures from 3/11 showing 4+ growth Stenotrophomonas maltophilia. Could be colonization given prior BAL growth of same, however due to recent intubation with prolonged corticosteroid theraphy, will begin treating as true pathogen. Patient otherwise remains afebrile, no leukocytosis. CRP with down trending.  Previously on Bactrim from 3/13 to 3/18, discontinued due to worsening ELIESER  Plan:  -Continue minocycline 200mg bid, via NGT; hold TF 1hr pre and post minocycline adminstration.  -Plan to treat for 14d course through 3/26 w/ abx  -IV steroids as above     MSK/Skin:  Diagnosis: Midline incision wound with poor wound healing-  -General surgery performed staple removal of the patient's midline incision on 3/12 with some skin signs appreciated at the inferior aspect of the wound without obvious pus drainage. Overnight 3/13, wound dehiscence progressed requiring general surgery reevaluation. Red/brown aspirate noted, fascia intact. Wet-to-dry dressings in place. General surgery following for next Epson wound care.  -Poor wound healing in setting of high-dose steroid therapy.  No  exam no obvious signs of infection at this time.   -S/P wound vac replacement 3/13 as per gen surgery. No active concerns for cellulitis.  Plan:  -Wound VAC management as per general surgery  -Wound care for peritracheostomy wound  -Abdominal wound care as per general surgery  -Routine monitoring     Diagnosis: Critical illness myopathy  -Likely exacerbated by high-dose  steroid therapy  Plan:  -Continue to wean steroids as above  -Aggressive PT/OT         PICC team consulted for poor access.     Disposition: Critical care    ICU Core Measures     Vented Patient  VAP Bundle  VAP bundle ordered     A: Assess, Prevent, and Manage Pain Has pain been assessed? Yes  Need for changes to pain regimen? NA   B: Both Spontaneous Awakening Trials (SATs) and Spontaneous Breathing Trials (SBTs) Plan to perform spontaneous awakening trial today? N/A   Plan to perform spontaneous breathing trial today? N/A   Obvious barriers to extubation? NA   C: Choice of Sedation RASS Goal: N/A patient not on sedation  Need for changes to sedation or analgesia regimen? NA   D: Delirium CAM-ICU: Negative   E: Early Mobility  Plan for early mobility? Yes   F: Family Engagement Plan for family engagement today? Yes       Antibiotic Review: Patient on appropriate coverage based on culture data.  and Infectious disease consulted    Review of Invasive Devices:    Ortiz Plan: voiding trial today        Prophylaxis:  VTE Contraindicated secondary to: Plt <50   Stress Ulcer  covered bypantoprazole (PROTONIX) injection 40 mg [[id number]]        Significant 24hr Events     24hr events:  NAOE reported. Patient denies any pain or discomfort.      Subjective     Review of Systems   Unable to perform ROS: Acuity of condition        Objective                            Vitals I/O      Most Recent Min/Max in 24hrs   Temp 98 °F (36.7 °C) Temp  Min: 98 °F (36.7 °C)  Max: 98.9 °F (37.2 °C)   Pulse (!) 124 Pulse  Min: 116  Max: 139   Resp 20 Resp  Min: 14  Max: 24   /70 BP  Min: 105/74  Max: 157/75   O2 Sat 95 % SpO2  Min: 95 %  Max: 99 %     LDA  Peripheral (R)   Ileostomy RUQ 34d, draining bilious o/p  Wound vac 200cc sanguinous fluid, stable  NGT 20d  Ortiz, <1d, retention protocol   Surgical airway , cuffed 13d   Intake/Output Summary (Last 24 hours) at 3/26/2024 0650  Last data filed at 3/26/2024 0600  Gross per 24 hour    Intake 3311.83 ml   Output 3050 ml   Net 261.83 ml       Diet Enteral/Parenteral; Tube Feeding No Oral Diet; Vital 1.5; Continuous; 45; Prosource Protein Liquid - One Packet; OD; 350; Water; Every 4 hours    Invasive Monitoring           Physical Exam   Physical Exam  Eyes:      Pupils: Pupils are equal, round, and reactive to light.   Skin:     General: Skin is warm and dry.   HENT:      Nose: No epistaxis.      Mouth/Throat:      Mouth: Mucous membranes are moist.   Neck:      Trachea: Tracheostomy present.   Cardiovascular:      Rate and Rhythm: Regular rhythm. Tachycardia present.      Heart sounds: No murmur heard.  Musculoskeletal:         General: No swelling.      Right lower leg: No edema.      Left lower leg: No edema.   Abdominal: General: An ostomy site is present. There is ostomy site.     Palpations: Abdomen is soft.      Tenderness: There is no abdominal tenderness.      Comments: Wound vac   Constitutional:       Appearance: She is ill-appearing.      Interventions: She is not sedated and not intubated.  Pulmonary:      Effort: No accessory muscle usage, respiratory distress or accessory muscle usage. She is not intubated.      Breath sounds: Normal breath sounds. No wheezing.   Neurological:      General: No focal deficit present.      Mental Status: She is alert.      Comments: Able to follow commands with head nodding,   Weak  strength BUE and Unable to assess strength due to profound weakness in all extremities    Genitourinary/Anorectal:     Comments: Ortiz draining clear yellow urine  Ortiz present.          Diagnostic Studies      EKG: no new  Imaging: no new I have personally reviewed pertinent reports.       Medications:  Scheduled PRN   chlorhexidine, 15 mL, Q12H SARTHAK  ipratropium, 0.5 mg, TID  lamoTRIgine, 150 mg, BID  levalbuterol, 1.25 mg, TID  methylPREDNISolone sodium succinate, 20 mg, BID  minocycline, 200 mg, BID  nystatin, , BID  pantoprazole, 40 mg, Q12H SARTHAK  polyethylene  glycol, 17 g, Daily  sodium chloride, 2 spray, Q4H  sodium chloride, 4 mL, TID  topiramate, 50 mg, BID  venlafaxine, 12.5 mg, TID With Meals      acetaminophen, 650 mg, Q6H PRN  fentaNYL, 50 mcg, Q1H PRN  ondansetron, 4 mg, Q6H PRN  sodium chloride, 1 Application, Q1H PRN       Continuous    insulin regular (HumuLIN R,NovoLIN R) 1 Units/mL in sodium chloride 0.9 % 100 mL infusion, 0.3-21 Units/hr, Last Rate: 4 Units/hr (03/26/24 0554)         Labs:    CBC    Recent Labs     03/25/24  0523 03/25/24  0835 03/26/24  0548   WBC 1.31* 1.20* 0.90*   HGB 8.7* 8.8* 8.3*   HCT 27.6* 27.8* 26.8*   PLT 40* 38* 27*   BANDSPCT 30*  --   --      BMP    Recent Labs     03/26/24  0026 03/26/24  0548   SODIUM 151* 152*   K 4.7 3.8   * 121*   CO2 19* 20*   AGAP 12 11   BUN 70* 63*   CREATININE 0.81 0.79   CALCIUM 9.1 9.1       Coags    No recent results     Additional Electrolytes  Recent Labs     03/25/24  0523 03/26/24  0548   MG 2.4 2.1   PHOS 3.7 3.9          Blood Gas    No recent results  No recent results LFTs  Recent Labs     03/25/24  0523   TBILI 0.53       Infectious  No recent results     COVID ag 3/25 negative    BAL 3/11   --4+ steno malto., 2+ candida  --Viral Cx neg  --Fungal 4+ candida  CMV neg  PCP smear neg  COVID pcr + on 3/11 Glucose  Recent Labs     03/25/24  0523 03/25/24  1234 03/26/24  0026 03/26/24  0548   GLUC 152* 177* 116 179*               Joe Lazcano DO

## 2024-03-26 NOTE — QUICK NOTE
IR consult for bone marrow biopsy completed today.     Patient would need to be prone for procedure to be completed.  Given recent abdominal surgery with wound dehiscence and hemorrhagic shock, patient unable to lay prone at this time.  IR bone biopsy order canceled.    Awaiting Surgery recommendations for timing.     Please reconsult IR when patient medically optimized for procedure.

## 2024-03-26 NOTE — PROGRESS NOTES
Progress Note - Infectious Disease   Carla Hunt 59 y.o. female MRN: [id number]  Unit/Bed#: MICU 12 Encounter: [id number]      Impression/Plan:    1. Acute hypoxic respiratory failure, likely multifactorial, with both COVID and bacterial pneumonia contributing.  Also consider persistent inflammation, fibrosis as seems quite steroid responsive in past.  Most recently extubated 3/1.  Patient with worsening respiratory status requiring intubation again 3/11.  Suspect ongoing hypoxia related to persistent COVID-pneumonia, superimposed bacterial infection, inflammatory component/lung fibrosis related to COVID, now with profound deconditioning and muscle weakness.  Status post bronchoscopy and trach 3/12 with excessive secretions seen from the lower lobes bilaterally.  BAL culture from 3/11 shows heavy growth of Stenotrophomonas maltophilia.  PJP DFA negative. While the patient has grown this before and she certainly may be colonized, given worsening CT findings, intubation and prolonged steroids would treat as a true pathogen.  Status post 10 days of vancomycin and cefepime.  Started on Bactrim 3/13, switched to minocycline 3/18.  Patient completed 14-day course of remdesivir/Paxlovid 3/15.  CRP improving. Status post repeat bronchoscopy 3/17 for airway clearance with continued thick secretions.  Low concern for uncontrolled infection contributing to clinical picture. Respiratory status improving, patient now on trach collar 6L O2. No fevers.   -Continue p.o. minocycline 200 mg twice daily via NG tube.  Tube feeds should be held for 1 hour prior to and 1 hour after minocycline administration.  Complete a 14-day total course, last dose tomorrow a.m.  -Steroid dosing per critical care, tolerating weaning   -No indication to treat Candida in respiratory culture, this is respiratory colonization  -If clinically deteriorates with concern for progressive sepsis would add meropenem 1g IV Q8 to the Bactrim  -Fungitell is  positive but low clinical concern for invasive fungal infection at this time; recent antibiotics, blood transfusion, candida in sputum may be causing false positive result.  The patient has been improving without any antifungal therapy and empiric therapy raises risk of further affecting normal lauren.  Will continue to monitor.  -For completeness, follow up histoplasma urine antigen     2. SIRS versus sepsis.  Fluctuating fever, WBC.  Likely due to persistent COVID, bacterial pneumonia. CT C/A/P 3/10 shows stable groundglass and nodular opacities, inflammation around stoma. Now with dehiscence of her abdominal wound following staple removal, status post wound VAC placement 3/13. Patient afebrile, without leukocytosis, weaning on trach collar. No new clinical signs of infection  -antibiotics as above  -Follow temperatures closely  -Recheck WBC in AM to monitor infection  -Supportive care as per the primary service     3. Recurrent bacterial pneumonia.  The patient has completed multiple treatment courses over the hospitalization. Prior BAL cultures with Pseudomonas and stenotrophomonas.  Unclear if pathogens or colonizers.  Status post 10 days levofloxacin.  CT C/A/P showed evolving changes likely all due to sequelae of COVID, infections.  Noted to have worsening hypoxia and purulent secretions on bronchoscopy 3/11.  BAL culture with heavy growth of Stenotrophomonas maltophilia, Candida              -Antibiotics as above              -Wean O2 as able     4. Severe COVID, present on admission.  Patient was treated with a 10-day course of remdesivir, dexamethasone and was given 1 dose of Tocilizumab on admission.  COVID antigen was negative prior to coming off isolation.  Had positive COVID PCR from BAL 2/16, status post another 5-day course of remdesivir.  Patient has remained on high-dose systemic corticosteroid throughout her hospitalization which were recently intensified 2/26 for fevers.  Patient having persistent  fevers, hypoxia.  COVID antigen positive and PCR is also positive 3/3 and Ct 26.8, consistent with high viral replication.  Repeat PCR positive with Ct now closer to 30. CRP overall decreasing with slow steroid wean.  Status post 14-day course of remdesivir/Paxlovid 3/15/2024  -Steroid wean per ICU  -No additional antivirals indicated  -check COVID rapid antigen today and at 48 hours. Today's antigen is negative. If repeat rapid antigen Wednesday is negative, patient can be removed from isolation     5. 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  Patient is status post exploratory laparotomy with ileocecectomy and end ileostomy creation 2/20.   End ileostomy is with ongoing ischemic changes which is likely contributing to the imaging findings and ongoing SIRS response.  Now with wound dehiscence status post staple removal, status post wound VAC placement 3/13, removed but replaced due to bleeding  -Serial exams  -Surgery follow-up   -no current signs of infection, need for antibiotics      B-cell lymphoma, on maintenance rituxan, which is currently postponed.  Rituximab is a risk factor for persistent COVID infection.    7.  ELIESER.  More likely due to hypovolemia since creatinine was increasing prior to starting Bactrim.   -Monitor creatinine closely   -Dose adjust antibiotics as needed    8.  Anemia, thrombocytopenia, lymphopenia.  Patient has had persistent lymphopenia throughout this admission, likely due to rituximab, viral infection, high-dose steroids.  Now with worsening anemia, neutropenia, and thrombocytopenia.  Bactrim discontinued 3/18. CMV PCR negative. Hgb now stable after transfusion but white blood count platelets continue to worsen.  Not a likely side effect of minocycline.  Immunoglobulins are low   -Steroid wean per primary   -Transfusion support per primary   -Hematology consulted, recommending bone marrow biopsy    Above management plan to continue p.o. minocycline, repeat COVID rapid  antigen tomorrow discussed with critical care team who is in agreement.  Discussed with the patient's  at bedside.  ID will continue to follow.    Antibiotics:  Day 9 minocycline (day 13 total)    Subjective:  The patient is slightly more alert today, she moves her head and mouths words.  She is afebrile.  Remains on 6 to 8 L O2 via trach collar.  White blood cell count continues to decrease, hematology is recommending bone marrow biopsy.    Objective:  Vitals:  Temp:  [98 °F (36.7 °C)-98.9 °F (37.2 °C)] 98.7 °F (37.1 °C)  HR:  [116-139] 119  Resp:  [14-23] 18  BP: (105-153)/(57-87) 114/63  SpO2:  [95 %-99 %] 98 %  Temp (24hrs), Av.5 °F (36.9 °C), Min:98 °F (36.7 °C), Max:98.9 °F (37.2 °C)  Current: Temperature: 98.7 °F (37.1 °C)    Physical Exam:   General Appearance:  Ill-appearing, arousable   Neck: Trach in place.   Lungs:   Rhonchi bilaterally   Heart:  RRR; no murmur, rub or gallop   Abdomen:   Midline ostomy with brown stool, wound VAC in place   Extremities: No edema   Skin: No new rashes or lesions.        Labs:   All pertinent labs and imaging studies were personally reviewed  Results from last 7 days   Lab Units 24  0548 24  0835 24  0523   WBC Thousand/uL 0.90* 1.20* 1.31*   HEMOGLOBIN g/dL 8.3* 8.8* 8.7*   PLATELETS Thousands/uL 27* 38* 40*     Results from last 7 days   Lab Units 24  0548 24  0026 24  1234 24  0523 24  0437 24  0519 24  1333 24  0831   SODIUM mmol/L 152* 151* 153*   < > 150* 150*   < >  --    POTASSIUM mmol/L 3.8 4.7 4.1   < > 4.4 4.8   < >  --    CHLORIDE mmol/L 121* 120* 123*   < > 119* 116*   < >  --    CO2 mmol/L 20* 19* 22   < > 23 21   < >  --    CO2, I-STAT mmol/L  --   --   --   --   --   --   --  15*   BUN mg/dL 63* 70* 70*   < > 68* 75*   < >  --    CREATININE mg/dL 0.79 0.81 0.89   < > 0.99 1.12   < >  --    EGFR ml/min/1.73sq m 82 80 71   < > 63 54   < >  --    GLUCOSE, ISTAT mg/dl  --   --   --    --   --   --   --  118   CALCIUM mg/dL 9.1 9.1 9.2   < > 9.2 8.8   < >  --    AST U/L  --   --   --   --  13 21  --   --    ALT U/L  --   --   --   --  41 40   < >  --    ALK PHOS U/L  --   --   --   --  115* 98   < >  --     < > = values in this interval not displayed.           Results from last 7 days   Lab Units 03/25/24  0523 03/23/24  0519 03/21/24  1232   CRP mg/L 256.9* 249.3* 226.8*           Results from last 7 days   Lab Units 03/20/24  0457   D-DIMER QUANTITATIVE ug/ml FEU 1.92*           Imaging:  CXR personally reviewed and shows mild improvement in multifocal bronchopneumonia

## 2024-03-26 NOTE — UTILIZATION REVIEW
"Continued Stay Review    Date: 03/26                          Current Patient Class: IP  Current Level of Care: Level 2 stepdown/HOT    HPI:59 y.o. female initially admitted on  01/10    Assessment/Plan: NAOE reported. Patient denies any pain or discomfort. Following commands, Weak  strength BUE, tachycardic. Repeat COVID ag test 3/25 negative  DC modafinil. Slow steroid wean: currently 20 BID day. Continue trach collar, wean O2 as able. Plt dropped to 26.8, transfuse 1 u today. Na improved w/ D5W, uptitrate rpt BMP this PM. Plan for VBS. .    Hemo onc consulted, plan for IR bone marrow bx to rule out primary marrow disorder. Plan for G-CSF as per heme-onc    Trend CBC and diff daily, neutropenic precautions for ANC <500 .       Vital Signs: /63   Pulse (!) 128   Temp 98.2 °F (36.8 °C) (Oral)   Resp 21   Ht 5' 8\" (1.727 m)   Wt 72 kg (158 lb 11.7 oz)   SpO2 96%   BMI 24.14 kg/m²       Pertinent Labs/Diagnostic Results:       Results from last 7 days   Lab Units 03/26/24  0548 03/25/24  0835 03/25/24  0523 03/24/24  0437 03/23/24  0519   WBC Thousand/uL 0.90* 1.20* 1.31* 1.78* 2.43*   HEMOGLOBIN g/dL 8.3* 8.8* 8.7* 10.4* 9.7*   HEMATOCRIT % 26.8* 27.8* 27.6* 33.0* 29.9*   PLATELETS Thousands/uL 27* 38* 40* 54* 68*   NEUTROS ABS Thousands/µL  --  0.94*  --   --   --    TOTAL NEUT ABS Thousand/uL  --   --  1.07*  --   --    BANDS PCT % 32*  --  30*  --  3     Results from last 7 days   Lab Units 03/25/24  0523   RETIC CT ABS  28,400   RETIC CT PCT % 1.01     Results from last 7 days   Lab Units 03/26/24  1438 03/26/24  0548 03/26/24  0026 03/25/24  1234 03/25/24  0523 03/24/24  0437 03/23/24  0519 03/22/24  0524 03/20/24  1333 03/20/24  0831   SODIUM mmol/L 142 152* 151* 153* 156* 150* 150* 145   < >  --    POTASSIUM mmol/L 4.2 3.8 4.7 4.1 4.3 4.4 4.8 4.0   < >  --    CHLORIDE mmol/L 115* 121* 120* 123* 124* 119* 116* 113*   < >  --    CO2 mmol/L 20* 20* 19* 22 23 23 21 20*   < >  --    CO2, I-STAT " "mmol/L  --   --   --   --   --   --   --   --   --  15*   ANION GAP mmol/L 7 11 12 8 9 8 13 12   < >  --    BUN mg/dL 57* 63* 70* 70* 72* 68* 75* 77*   < >  --    CREATININE mg/dL 0.80 0.79 0.81 0.89 0.89 0.99 1.12 1.33*   < >  --    EGFR ml/min/1.73sq m 81 82 80 71 71 63 54 44   < >  --    CALCIUM mg/dL 8.5 9.1 9.1 9.2 9.4 9.2 8.8 8.8   < >  --    CALCIUM, IONIZED, ISTAT mmol/L  --   --   --   --   --   --   --   --   --  1.40*   MAGNESIUM mg/dL  --  2.1  --   --  2.4 2.3 2.5 2.3   < >  --    PHOSPHORUS mg/dL  --  3.9  --   --  3.7 3.8 4.8* 5.9*   < >  --     < > = values in this interval not displayed.     Results from last 7 days   Lab Units 03/25/24  0523 03/24/24  0437 03/23/24  0519   AST U/L  --  13 21   ALT U/L  --  41 40   ALK PHOS U/L  --  115* 98   TOTAL PROTEIN g/dL  --  5.0* 4.6*   ALBUMIN g/dL  --  2.5* 2.3*   TOTAL BILIRUBIN mg/dL 0.53 0.43 0.45     Results from last 7 days   Lab Units 03/26/24  1150 03/26/24  0953 03/26/24  0801 03/26/24  0559 03/26/24  0413 03/26/24  0214 03/26/24  0013 03/25/24  2151 03/25/24  1959 03/25/24  1814 03/25/24  1354 03/25/24  1226   POC GLUCOSE mg/dl 223* 216* 163* 158* 142* 140 132 142* 155* 174* 187* 150*     Results from last 7 days   Lab Units 03/26/24  1438 03/26/24  0548 03/26/24  0026 03/25/24  1234 03/25/24  0523 03/24/24  0437 03/23/24  0519 03/22/24  0524 03/21/24  1332 03/21/24  0552 03/20/24  1333 03/20/24  0502   GLUCOSE RANDOM mg/dL 202* 179* 116 177* 152* 154* 194* 176* 247* 132 186* 117             No results found for: \"BETA-HYDROXYBUTYRATE\"   Results from last 7 days   Lab Units 03/21/24  1512 03/21/24  0018 03/20/24  1606   PH ART  7.374 7.339* 7.278*   PCO2 ART mm Hg 32.6* 33.6* 33.8*   PO2 ART mm Hg 81.7 91.4 99.4   HCO3 ART mmol/L 18.6* 17.7* 15.5*   BASE EXC ART mmol/L -5.8 -7.2 -10.3   O2 CONTENT ART mL/dL 14.4* 14.5* 15.4*   O2 HGB, ARTERIAL % 95.1 95.5 96.2   ABG SOURCE  Line, Arterial Line, Arterial Line, Arterial         Results from last 7 " days   Lab Units 03/20/24  0831   PH, NICA I-STAT  7.323   PCO2, NICA ISTAT mm HG 27.7*   PO2, NICA ISTAT mm HG 57.0*   HCO3, NICA ISTAT mmol/L 14.4*   I STAT BASE EXC mmol/L -11*   I STAT O2 SAT % 88*     Results from last 7 days   Lab Units 03/19/24  1721   CK TOTAL U/L 47         Results from last 7 days   Lab Units 03/20/24  0457   D-DIMER QUANTITATIVE ug/ml FEU 1.92*     Results from last 7 days   Lab Units 03/21/24  1332 03/20/24  0457   PROTIME seconds 17.8* 16.2*   INR  1.49* 1.32*   PTT seconds 40* 34             Results from last 7 days   Lab Units 03/21/24  1332 03/20/24  0840   LACTIC ACID mmol/L 0.8 1.2                         Results from last 7 days   Lab Units 03/26/24  0819 03/22/24  0844 03/22/24  0555 03/20/24  0840 03/20/24  0837 03/20/24  0834 03/20/24  0834 03/20/24  0830   UNIT PRODUCT CODE  C1110X70 O0626B68  O7009H02  R7729M48  O6109M83 E3612C76 S3396S81 M4003A87  A3230Y38  I5327W41  I4798B73 S6570H97 V4314C36  P2177C17  Z3697B14  D7200G33 D9203Y89  K6722C77  R0149T21  M0681Z81  A7165I95  X7540E00   UNIT NUMBER  [id number]  [id number]  [id number]  [id number]  [id number]  [id number]  [id number]  [id number]  [id number]  [id number]  [id number]  [id number]  [id number]  [id number]  [id number]   UNITABO  A A  A  A  A A AB O  O  O  O A AB  AB  A  A O  O  O  O  O  O   UNITRH  POS NEG  NEG  NEG  NEG POS POS POS  POS  POS  POS POS POS  POS  POS  POS POS  POS  POS  POS  POS  POS   CROSSMATCH   --  Compatible  Compatible  Compatible  Compatible  --   --   --   --   --  Compatible  Compatible   UNIT DISPENSE STATUS  Issued Return to Inv  Return to Inv  Return to Inv  Presumed Trans Presumed Trans Return to Inv Return to Inv  Return to Inv  Return to Inv   Return to Inv Return to Inv Return to Inv  Return to Inv  Return to Inv  Return to Inv Return to Inv  Presumed Trans  Return to Inv  Return to Inv  Presumed Trans  Return to Inv   UNIT PRODUCT VOL mL 300 350  350  350  350 248 246 300  300  350  350 300 191  203  280  250 350  350  300  350  350  350             Results from last 7 days   Lab Units 03/25/24  0523 03/23/24  0519 03/21/24  1232   CRP mg/L 256.9* 249.3* 226.8*             Results from last 7 days   Lab Units 03/25/24  0523   TOTAL COUNTED  100               Medications:   Scheduled Medications:  chlorhexidine, 15 mL, Mouth/Throat, Q12H SARTHAK  insulin glargine, 10 Units, Subcutaneous, QAM  insulin lispro, 1-5 Units, Subcutaneous, 4x Daily  ipratropium, 0.5 mg, Nebulization, TID  lamoTRIgine, 150 mg, Oral, BID  levalbuterol, 1.25 mg, Nebulization, TID  methylPREDNISolone sodium succinate, 30 mg, Intravenous, BID  minocycline, 200 mg, Per NG Tube, BID  nystatin, , Topical, BID  pantoprazole, 40 mg, Intravenous, Q12H SARTHAK  polyethylene glycol, 17 g, Per NG Tube, Daily  sodium chloride, 2 spray, Each Nare, Q4H  sodium chloride, 4 mL, Nebulization, TID  topiramate, 50 mg, Per PEG Tube, BID  venlafaxine, 12.5 mg, Oral, TID With Meals      Continuous IV Infusions:  insulin regular (HumuLIN R,NovoLIN R) 1 Units/mL in sodium chloride 0.9 % 100 mL infusion  Rate: 0.3-21 mL/hr Dose: 0.3-21 Units/hr  Freq: Titrated Route: IV  Last Dose: Stopped (03/26/24 1315)  Start: 03/22/24 0845 End: 03/26/24 1158   dextrose 5 % infusion  Rate: 125 mL/hr Dose: 125 mL/hr  Freq: Continuous Route: IV  Indications of Use: IV Hydration  Last Dose: Stopped (03/26/24 1551)  Start: 03/26/24 0730 End: 03/26/24 1336   dextrose 5 % infusion  Rate: 100 mL/hr Dose: 100 mL/hr  Freq: Continuous Route: IV  Indications of Use: IV Hydration  Last Dose: Stopped (03/26/24 0024)  Start: 03/25/24 1830 End: 03/26/24 002      PRN Meds:  acetaminophen, 650 mg, Oral, Q6H PRN  fentaNYL,  25 mcg, Intravenous, Q1H PRN  ondansetron, 4 mg, Intravenous, Q6H PRN  sodium chloride, 1 Application, Nasal, Q1H PRN        Discharge Plan: TBD    Network Utilization Review Department  ATTENTION: Please call with any questions or concerns to [phone] and carefully listen to the prompts so that you are directed to the right person. All voicemails are confidential.   For Discharge needs, contact Care Management DC Support Team at [phone] opt. [phone]  Send all requests for admission clinical reviews, approved or denied determinations and any other requests to dedicated fax number below belonging to the campus where the patient is receiving treatment. List of dedicated fax numbers for the Facilities:  FACILITY NAME UR FAX NUMBER   ADMISSION DENIALS (Administrative/Medical Necessity) [phone]   DISCHARGE SUPPORT TEAM (NETWORK) [phone]   PARENT CHILD HEALTH (Maternity/NICU/Pediatrics) [phone]   [facility] [phone]   [facility] [phone]   [facility] [phone]   [facility] [phone]   [facility], [facility] [phone]   [facility] [phone]   [facility] [phone]   [facility] [phone]   [facility] [phone]   [facility] [phone]   [facility] [phone]   [facility] [phone]

## 2024-03-26 NOTE — RESPIRATORY THERAPY NOTE
Resp care   03/26/24 0717   Inhalation Therapy Tx   $ Inhalation Therapy Performed Yes   Pre-Treatment Pulse 128   Breath Sounds Pre-Treatment Bilateral Diminished;Clear   Breath Sounds Post-Treatment Bilateral Diminished;Clear   Post-Treatment Pulse 133   Resp Comments pt sx for small amt thick white. PT has irritation on neck but no wound around trach site.   Airway Suctioning/Secretions   Suction Type Tracheal   Suction Device Inline   Secretion Amount Small   Secretion Color White   Secretion Consistency Thick

## 2024-03-26 NOTE — PROGRESS NOTES
[facility]  Progress Note  Name: Carla Hunt I  MRN: [id number]  Unit/Bed#: MICU 12 I Date of Admission: 1/10/2024   Date of Service: 3/26/2024 I Hospital Day: 76    Assessment/Plan   Goals of care, counseling/discussion  Assessment & Plan  Code Status: full - Level 1  Ongoing medical optimization without limits at this time. Hopeful for slow recovery. Min does express understanding of periodic setbacks but is hoping Carla can build some momentum to become medically stable for d/c to rehab.   Min feels hopeful given patient's slow/steady improvement over the past several days.    Palliative care patient  Assessment & Plan  Time spent providing supportive listening to spouse Min at bedside along with SORAYA Bauer  Also supported by her daughter, son, and robust  group of friends, brother, and [place] community  Decisional apparatus:  Patient is not competent on my exam today.  If competence is lost, patient's substitute decision maker would default to spouse Min by [place].  Advance Directive / Living Will / POLST:  None on file, unable to complete  Continue provigil 100mg QD with improvement of energy and participation with PT/OT. Could consider increasing dose to 200mg    Teto marginal zone B-cell lymphoma (HCC)  Assessment & Plan  Previously on maintenance Rituxan therapy. Last treatment in December 2023  Primary considering bone marrow bx today    * Acute respiratory failure with hypoxia (HCC)  Assessment & Plan  Patient was re-intubated 3/11 with subsequent bedside trach on 3/12.   Very careful steroid wean  per critical care team.   On trach collar and PMV during time of encounter.  Spouse, Min, presented letter from hospital regarding hospital acquired infection. Answered questions to his satisfaction         Interval history:       No events overnight. Patient was more alert this morning. Opens eyes briefly during time of  encounter but returns to sleep. PMV in place. Spouse states that she was able to phonate a few words earlier. Min asked for palliative visit today, Specifically wanted help interpreting letter he received about hospital acquired infection disclosure. Answered questions to his satisfaction. He remains hopeful.    MEDICATIONS / ALLERGIES:     all current active meds have been reviewed    No Known Allergies    OBJECTIVE:    Physical Exam  Physical Exam  HENT:      Head: Normocephalic and atraumatic.   Eyes:      Conjunctiva/sclera: Conjunctivae normal.   Cardiovascular:      Rate and Rhythm: Normal rate.   Pulmonary:      Effort: No respiratory distress.      Comments: Trach collar O2 PMV in place  Abdominal:      General: There is no distension.      Tenderness: There is no guarding.      Comments: NGT in place   Musculoskeletal:         General: Swelling present.   Skin:     General: Skin is warm and dry.   Neurological:      Mental Status: She is alert.      Comments: Opens eyes briefly, makes eye contract, returns to sleep   Psychiatric:      Comments: calm         Lab Results:   Results from last 7 days   Lab Units 03/26/24  0548 03/25/24  0835 03/25/24  0523 03/24/24  0437 03/23/24  0519   WBC Thousand/uL 0.90* 1.20* 1.31*   < > 2.43*   HEMOGLOBIN g/dL 8.3* 8.8* 8.7*   < > 9.7*   HEMATOCRIT % 26.8* 27.8* 27.6*   < > 29.9*   PLATELETS Thousands/uL 27* 38* 40*   < > 68*   NEUTROS PCT %  --  78*  --   --   --    MONOS PCT %  --  2*  --   --   --    MONO PCT % 1*  --  4  --  2*   EOS PCT % 0 0  --   --  0    < > = values in this interval not displayed.     Results from last 7 days   Lab Units 03/26/24  1438 03/26/24  0548 03/26/24  0026 03/25/24  0523 03/24/24  0437 03/23/24  0519 03/20/24  1333 03/20/24  0831   POTASSIUM mmol/L 4.2 3.8 4.7   < > 4.4 4.8   < >  --    CHLORIDE mmol/L 115* 121* 120*   < > 119* 116*   < >  --    CO2 mmol/L 20* 20* 19*   < > 23 21   < >  --    CO2, I-STAT mmol/L  --   --   --   --    --   --   --  15*   BUN mg/dL 57* 63* 70*   < > 68* 75*   < >  --    CREATININE mg/dL 0.80 0.79 0.81   < > 0.99 1.12   < >  --    CALCIUM mg/dL 8.5 9.1 9.1   < > 9.2 8.8   < >  --    ALK PHOS U/L  --   --   --   --  115* 98  --   --    ALT U/L  --   --   --   --  41 40  --   --    AST U/L  --   --   --   --  13 21  --   --    GLUCOSE, ISTAT mg/dl  --   --   --   --   --   --   --  118    < > = values in this interval not displayed.       Imaging Studies: reviewed pertinent studies   EKG, Pathology, and Other Studies: reviewed pertinent studies    Counseling / Coordination of Care    Total floor / unit time spent today 25 minutes. Greater than 50% of total time was spent with the patient and / or family counseling and / or coordination of care. A description of the counseling / coordination of care: time spent assessing patient, communicating with RN, providing support.

## 2024-03-26 NOTE — CONSULTS
e-Consult (IPC)  - Interventional Radiology  Carla Hunt 59 y.o. female MRN: [id number]  Unit/Bed#: MICU 12 Encounter: [id number]          Interventional Radiology has been consulted to evaluate Carla Hunt    We were consulted by Hematology-Oncology concerning this patient with history ofmarginal zone lymphoma now with pancytopenia.    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     She has had a prolonged hospital course complicated by MDRO bacterial PNA with acute respiratory failure requiring multiple intubations now s/p Tracheostomy, cecal volvulus s/p right hemicolectomy with ileostomy with recent wound dehiscence and hemorrhagic shock requiring blood transfusions and persistent pancytopenia.      Hematology-Oncology following for pancytopenia. With concern pancytopenia may be related to primary marrow disorder, IR consulted for possible bone marrow biopsy.    Of note, patient with tracheostomy and recent abdominal surgery with wound dehiscence. Given patient needs to be prone for procedure, awaiting MICU and Surgery recommendations for procedure planning.     -Case discussed with Dr Shen and Dr De La Garza  -Plan for bone marrow biopsy, pending IR scheduling  -molecular testing and flow cytometry requested  -Appreciate anesthesia support.  -patient healthcare representative (spouse) will need to provide consent  -recommend hold enteral nutrition for 6 hours prior to procedure  -Appreciate Hematology-oncology recommendations  -remainder of care per Critical Care team       11-20  minutes, >50% of the total time devoted to medical consultative verbal/EMR discussion between providers. Written report will be generated in the EMR.     Thank you for allowing Interventional Radiology to participate in the care of Carla Hunt. Please don't hesitate to call or TigerText us with any questions.     DANYELLE Marquez

## 2024-03-26 NOTE — ASSESSMENT & PLAN NOTE
Previously on maintenance Rituxan therapy. Last treatment in December 2023  Primary considering bone marrow bx today

## 2024-03-26 NOTE — ASSESSMENT & PLAN NOTE
Time spent providing supportive listening to spouse Min at bedside along with Tania Velez MSW  Also supported by her daughter, son, and robust  group of friends, brother, and [place] community  Decisional apparatus:  Patient is not competent on my exam today.  If competence is lost, patient's substitute decision maker would default to spouse Min by [place].  Advance Directive / Living Will / POLST:  None on file, unable to complete  Continue provigil 100mg QD with improvement of energy and participation with PT/OT. Could consider increasing dose to 200mg

## 2024-03-26 NOTE — PROGRESS NOTES
Critical Care Attending Note; Bharat Márquez   Note Date: 24  Note Time: 11:44 AM    Patient: Carla Hunt  Age, : 59 y.o., 1965 MRN: [id number] Code Status: Level 1 - Full Code Patient Location: MICU 12/MICU 12   Hospital LOS:76 days  ]   Patient seen and examined, medical record reviewed, discussed with house staff and nursing staff.     HPI   CC: Respiratory Failure   59F with a PMH of B - Cell Lymphoma(Bendamustine-Rituximab x 6 cycles [21 - 1/3/22], maintenance Rituximab), Epilepsy(Temporal Lobe resection- Complex Migraines) originally admitted for AMS found to have COVID, she has had a very complicated course requiring multiple intubations, volvulus(Ex Lap - ileostomy - wound dehiscence), hemorrhagic shock,  PNAs.    Key Recent Events   : [facility] - AMS, COVID - Steroid protocol  1/10: Transfer [facility] Video EEG  : LP   1/15: LP  : Bronch  : Pulse Dose steroids - 3 days   Intubated - Epistaxis  2/15: IVIG Completed  : Bronch - COVID + Antigen/PCR  : Volvulus - ex lap, ileocecectomy, end ileostomy and mucus fistula creation   :  Bronch   : Bronch  3/1: Extubated  3/11: Re-intubated, Bronch - Stenotrophomonas/Pseudo  3/12: Bronch  3/12: Trach  3/13: BAL - Stenotrophomonas   3/15: 14d course Paxlovid/remdesivir  3/17: Bronch  3/20: Hemorrhagic shock - Ostomy - 2PRBCs - OR -   3/21: Hemorrhage Ostomy/WV- 1 PRBCs - OR -  3/25: No acute events overnight      Main ICU Plans:       #Neuro  Anxiety/Alertness  - Venlanfaxine    Seizure disorder  - Lamictal, Topimax    #Pulm  Hypoxic Respiratory Failure - COVID, Pneumonitis, Rituximab - Improved but failed weaning steroids. Extended steroid course and has been on steroids for > 1 month. Plan for slower wean from steroids to monitor for improvement   - TCT daily  - Wean  Solumedro 30 q12hr - hold for 1 week     #Renal  Hypernatremia   - Increase FWF  - D5W    #GI  Volvulus -   - General surgery following  -  Ostomy/Wound Care    Severe Protein Calorie malnutrition - Patient unlikely to maintain enough oral intake to keep up with calorie demand  - Recommend PEG once stable  - Swallow eval   - Aggressive PT/OT      #ID   Stenotrophomonas PNA  - ID Consulted - Minocycline    COVID PNA - Extended steroid/antiviral course requiring pulse dose steroids with improvement in respiratory function. Treatment difficult due to underlying immunosuppression  - Steroids as above  - Repeat covid test      #MSK  Steroid/Critical Care Neuropathy  - PT/OT       #Heme  Anemia - concerns for PUD, maybe due to BM suppression  - GI Consulted - holding off on EGD     Thrombocytopenia - Likely consumptive/BM suppression - possibly due to anti-epileptics, doxycycline  - Follow MAGDIEL workup   - Heme consulted - Plan for BMB  - PLT > 50     #Endo  DM  - Lantus 10 units sq  - ISS    #DVT/GI ppx  Hold SQH     #Lines/Tubes/Drains:   Invasive Devices       Peripheral Intravenous Line  Duration             Long-Dwell Peripheral IV (Midline) 03/08/24 Left Cephalic Vein 17 days    Peripheral IV 03/26/24 Right;Ventral (anterior) Forearm <1 day              Drain  Duration             Ileostomy RUQ 34 days    NG/OG/Enteral Tube Nasogastric 14 Fr Right nare 21 days    Urethral Catheter 2 days              Airway  Duration             Surgical Airway Shiley Cuffed 13 days                    #Nutrition:   Diet Enteral/Parenteral; Tube Feeding No Oral Diet; Vital 1.5; Continuous; 45; Prosource Protein Liquid - One Packet; OD; 350; Water; Every 4 hours        #Code Status:   Level 1 - Full Code    #Dispo:   ICU        Bharat Márquez MD  Pulmonary, Critical Care    Critical care time, excluding procedures, teaching, family meetings, and excludes any prior time recorded by the AP/resident, 35 minutes. Upon my evaluation, this patient has a high probability of imminent or life-threatening deterioration due to above problems which required my direct attention,  intervention, and personal management.   Impression/Active Problems:    Acute hypoxic respiratory failure ventilator dependent- s/p tracheostomy  Bilateral ground glass opacities- likely persistent PNA and superimposed bacterial infection  Persistent COVID Pna with superimposed bacterial pna- w/ stenotrophomonas  Acute blood loss anemia- from stoma site  Hemorrhagic shock  Severe encephalopathy- possibly due to uremia  Cutaneous ulcer- below the trach site, likely due to pressure.   Stenotrophomonas PNA  Sepsis- 2/2 persistent pneumonia  Volvulus- post hemicolectomy s/p cecal ostomy. Noted stomal retraction  Shock- unclear etiology  Thrombocytopenia  Bcell lymphoma- on rituximab  Steroid induced hyperglycemia  Minimal SAH POA  Seizure disorder  Upper extremity thrombophlebitis  Severe lymphopenia  Hx of migraines- s/p left temporal lobectomy  Hypogammaglobulinemia  Critical illness myopathy      Physical Exam:     Vital Signs:   Weight: 72 kg (158 lb 11.7 oz)  IBW: Ideal body weight: 63.9 kg (140 lb 14 oz)  Adjusted ideal body weight: 67.1 kg (148 lb 0.3 oz)  Temp:  [98 °F (36.7 °C)-98.9 °F (37.2 °C)] 98.7 °F (37.1 °C)  HR:  [116-139] 119  Resp:  [14-23] 18  BP: (105-153)/(57-87) 114/63  FiO2 (%):  [35] 35  Physical Exam: Unchanged  General: NAD  Neuro: Alert, following commands  Heart: RRR  Lungs: Basilar crackles  Abdomen: Midline - Wound vac - CDI, Ostomy, NT  Extremities: Edema                Ventilator Settings:    FiO2 (%):  [35] 35    Results from last 7 days   Lab Units 03/19/24  1237   PO2 NICA mm Hg 51.0*     Radiologic Images Reviewed:    CXR  Mild prominent interstitial markings. No pneumothorax or pleural effusion.     Normal cardiomediastinal silhouette.     Bones are unremarkable for age.     Normal upper abdomen.     IMPRESSION:     Mild congestive changes.    CT C/A/P  IMPRESSION:  1. Persistent bilateral groundglass and nodular consolidation with peripheral opacification at the right greater than  left lung bases. Findings remain concerning for multi lobar infiltrates with possible superimposed COVID-19 opacities.  2. Status post ileocolectomy with a right lower quadrant ileostomy again exhibiting a patent stoma. Persistent inflammatory change and subcutaneous emphysema after recent intervention. No drainable collection.  3. Bilateral renal calcifications again suggestive of medullary sponge kidney with persistent mild right renal fullness but no evidence of distal obstructive pathology.        Input / Output:     Intake/Output Summary (Last 24 hours) at 3/26/2024 1144  Last data filed at 3/26/2024 1033  Gross per 24 hour   Intake 3786.25 ml   Output 3200 ml   Net 586.25 ml            Infusions:  dextrose, 125 mL/hr, Last Rate: 125 mL/hr (03/26/24 0737)  insulin regular (HumuLIN R,NovoLIN R) 1 Units/mL in sodium chloride 0.9 % 100 mL infusion, 0.3-21 Units/hr, Last Rate: 8 Units/hr (03/26/24 1033)      Scheduled Medications:  Current Facility-Administered Medications   Medication Dose Route Frequency Provider Last Rate    acetaminophen  650 mg Oral Q6H PRN Moises Bowden MD      chlorhexidine  15 mL Mouth/Throat Q12H [facility] Moises Bowden MD      dextrose  125 mL/hr Intravenous Continuous Ammar Alam,  mL/hr (03/26/24 0737)    fentaNYL  25 mcg Intravenous Q1H PRN Ammar Alam, DO      insulin regular (HumuLIN R,NovoLIN R) 1 Units/mL in sodium chloride 0.9 % 100 mL infusion  0.3-21 Units/hr Intravenous Titrated Ammar Alam, DO 8 Units/hr (03/26/24 1033)    ipratropium  0.5 mg Nebulization TID Moises Bowden MD      lamoTRIgine  150 mg Oral BID Moises Bowden MD      levalbuterol  1.25 mg Nebulization TID Moises Bowden MD      methylPREDNISolone sodium succinate  20 mg Intravenous BID Ammar Alam, DO      minocycline  200 mg Per NG Tube BID Betzaida Sr MD      nystatin   Topical BID Moises Bowden MD      ondansetron  4 mg Intravenous Q6H PRN Moises Bowden MD      pantoprazole  40 mg Intravenous Q12H [facility] Moises Bowden MD       "polyethylene glycol  17 g Per NG Tube Daily Moises Bowden MD      sodium chloride  1 Application Nasal Q1H PRN Moises Bowden MD      sodium chloride  2 spray Each Nare Q4H Moises Bowden MD      sodium chloride  4 mL Nebulization TID Moises Bowden MD      topiramate  50 mg Per PEG Tube BID Moises Bowden MD      venlafaxine  12.5 mg Oral TID With Meals Moises Bowden MD         PRN Medications:    acetaminophen    fentaNYL    ondansetron    sodium chloride    Labs Reviewed:  Results from last 7 days   Lab Units 03/26/24  0548 03/25/24  0835 03/25/24  0523   WBC Thousand/uL 0.90* 1.20* 1.31*   HEMOGLOBIN g/dL 8.3* 8.8* 8.7*   HEMATOCRIT % 26.8* 27.8* 27.6*   PLATELETS Thousands/uL 27* 38* 40*      Results from last 7 days   Lab Units 03/26/24  0548 03/26/24  0026 03/25/24  1234 03/25/24  0523 03/24/24  0437 03/20/24  1333 03/20/24  0831   SODIUM mmol/L 152* 151* 153* 156* 150*   < >  --    CO2 mmol/L 20* 19* 22 23 23   < >  --    CO2, I-STAT mmol/L  --   --   --   --   --   --  15*   BUN mg/dL 63* 70* 70* 72* 68*   < >  --    GLUCOSE, ISTAT mg/dl  --   --   --   --   --   --  118   CALCIUM mg/dL 9.1 9.1 9.2 9.4 9.2   < >  --    MAGNESIUM mg/dL 2.1  --   --  2.4 2.3   < >  --    PHOSPHORUS mg/dL 3.9  --   --  3.7 3.8   < >  --     < > = values in this interval not displayed.         Invalid input(s): \"ASTSGOT\", \"ALTSGPT\"LABRCNTIP@ ,alkphos:3,tbilirubin:3,dbilirubin:3)@  Results from last 7 days   Lab Units 03/21/24  1332 03/20/24  0457   INR  1.49* 1.32*           Invalid input(s): \"TROPT\", \"PBNP\"             I have personally seen and examined the patient on (03/26/24 between 5205-8250). I discussed the patient with the AP/resident including, but not limited to, verifying findings; reviewing labs and x-rays; discussing with consultants; developing the plan of care with the bedside nurse; and discussing treatment plan with patient or surrogate.  I have reviewed the note and assessment performed by the AP/resident and agree with the " AP/resident’s documented findings and plan of care with the above additions/exceptions. Please see my comments for details and adjustments.

## 2024-03-26 NOTE — RESTORATIVE TECHNICIAN NOTE
Restorative Technician Note      Patient Name: Carla Hunt     Restorative Tech Visit Date: 03/26/24  Note Type: Mobility  Patient Position Upon Consult: Supine  Activity Performed: Dangled; Range of motion  Assistive Device: Other (Comment) (Ax2)  Range of Motion: Right leg; Left leg  Education Provided: Yes; Family or social support of family present for education by provider  Patient Position at End of Consult: Supine; All needs within reach; Bed/Chair alarm activated      Marixa العراقي Restorative Tech

## 2024-03-26 NOTE — ASSESSMENT & PLAN NOTE
Code Status: full - Level 1  Ongoing medical optimization without limits at this time. Hopeful for slow recovery. Min does express understanding of periodic setbacks but is hoping Carla can build some momentum to become medically stable for d/c to rehab.   Min feels hopeful given patient's slow/steady improvement over the past several days.

## 2024-03-26 NOTE — ASSESSMENT & PLAN NOTE
Patient was re-intubated 3/11 with subsequent bedside trach on 3/12.   Very careful steroid wean  per critical care team.   On trach collar and PMV during time of encounter.  Spouse, Min, presented letter from hospital regarding hospital acquired infection. Answered questions to his satisfaction

## 2024-03-26 NOTE — PROGRESS NOTES
Nutrition Follow-Up/Recommendations:    Continue Vital 1.5 @ 45mL/hr + one packet Prosource Liquid Protein daily.     Consider adding Refugio BID to support wound healing, this will provide an additional 180 kcals and 5g protein (with TF would provide total of 1860 kcals and 93g protein (1.5 g/kg IBW)).     Adjustments in additional free water flushes per Nephrology and/or critical care.

## 2024-03-27 LAB
ABO GROUP BLD BPU: NORMAL
ANION GAP SERPL CALCULATED.3IONS-SCNC: 12 MMOL/L (ref 4–13)
ANISOCYTOSIS BLD QL SMEAR: PRESENT
BASOPHILS # BLD MANUAL: 0 THOUSAND/UL (ref 0–0.1)
BASOPHILS NFR MAR MANUAL: 0 % (ref 0–1)
BPU ID: NORMAL
BUN SERPL-MCNC: 56 MG/DL (ref 5–25)
CALCIUM SERPL-MCNC: 9.3 MG/DL (ref 8.4–10.2)
CHLORIDE SERPL-SCNC: 114 MMOL/L (ref 96–108)
CO2 SERPL-SCNC: 20 MMOL/L (ref 21–32)
CREAT SERPL-MCNC: 0.83 MG/DL (ref 0.6–1.3)
EOSINOPHIL # BLD MANUAL: 0 THOUSAND/UL (ref 0–0.4)
EOSINOPHIL NFR BLD MANUAL: 0 % (ref 0–6)
ERYTHROCYTE [DISTWIDTH] IN BLOOD BY AUTOMATED COUNT: 17.1 % (ref 11.6–15.1)
GFR SERPL CREATININE-BSD FRML MDRD: 77 ML/MIN/1.73SQ M
GLUCOSE SERPL-MCNC: 244 MG/DL (ref 65–140)
GLUCOSE SERPL-MCNC: 263 MG/DL (ref 65–140)
GLUCOSE SERPL-MCNC: 273 MG/DL (ref 65–140)
GLUCOSE SERPL-MCNC: 321 MG/DL (ref 65–140)
HCT VFR BLD AUTO: 24.8 % (ref 34.8–46.1)
HGB BLD-MCNC: 7.7 G/DL (ref 11.5–15.4)
LYMPHOCYTES # BLD AUTO: 0.02 THOUSAND/UL (ref 0.6–4.47)
LYMPHOCYTES # BLD AUTO: 2 % (ref 14–44)
MACROCYTES BLD QL AUTO: PRESENT
MAGNESIUM SERPL-MCNC: 2 MG/DL (ref 1.9–2.7)
MCH RBC QN AUTO: 30.7 PG (ref 26.8–34.3)
MCHC RBC AUTO-ENTMCNC: 31 G/DL (ref 31.4–37.4)
MCV RBC AUTO: 99 FL (ref 82–98)
METAMYELOCYTES NFR BLD MANUAL: 3 % (ref 0–1)
MONOCYTES # BLD AUTO: 0 THOUSAND/UL (ref 0–1.22)
MONOCYTES NFR BLD: 0 % (ref 4–12)
NEUTROPHILS # BLD MANUAL: 0.86 THOUSAND/UL (ref 1.85–7.62)
NEUTS BAND NFR BLD MANUAL: 46 % (ref 0–8)
NEUTS SEG NFR BLD AUTO: 49 % (ref 43–75)
NRBC BLD AUTO-RTO: 0 /100 WBCS
NRBC BLD AUTO-RTO: 5 /100 WBC (ref 0–2)
OVALOCYTES BLD QL SMEAR: PRESENT
PF4 HEPARIN CMPLX AB SER-ACNC: 0.05 OD (ref 0–0.4)
PHOSPHATE SERPL-MCNC: 4.6 MG/DL (ref 2.7–4.5)
PLATELET # BLD AUTO: 43 THOUSANDS/UL (ref 149–390)
PLATELET BLD QL SMEAR: ABNORMAL
PMV BLD AUTO: 12.4 FL (ref 8.9–12.7)
POTASSIUM SERPL-SCNC: 4 MMOL/L (ref 3.5–5.3)
RBC # BLD AUTO: 2.51 MILLION/UL (ref 3.81–5.12)
RBC MORPH BLD: PRESENT
SARS-COV-2 AG UPPER RESP QL IA: NEGATIVE
SARS-COV-2 AG UPPER RESP QL IA: NEGATIVE
SARS-COV-2 AG UPPER RESP QL IA: NORMAL
SARS-COV-2 AG UPPER RESP QL IA: NORMAL
SODIUM SERPL-SCNC: 146 MMOL/L (ref 135–147)
UNIT DISPENSE STATUS: NORMAL
UNIT PRODUCT CODE: NORMAL
UNIT PRODUCT VOLUME: 300 ML
UNIT RH: NORMAL
WBC # BLD AUTO: 0.91 THOUSAND/UL (ref 4.31–10.16)

## 2024-03-27 PROCEDURE — NC001 PR NO CHARGE: Performed by: PSYCHIATRY & NEUROLOGY

## 2024-03-27 PROCEDURE — 0T9B70Z DRAINAGE OF BLADDER WITH DRAINAGE DEVICE, VIA NATURAL OR ARTIFICIAL OPENING: ICD-10-PCS | Performed by: UROLOGY

## 2024-03-27 PROCEDURE — 99232 SBSQ HOSP IP/OBS MODERATE 35: CPT | Performed by: INTERNAL MEDICINE

## 2024-03-27 PROCEDURE — 84100 ASSAY OF PHOSPHORUS: CPT

## 2024-03-27 PROCEDURE — C9254 INJECTION, LACOSAMIDE: HCPCS | Performed by: PHYSICIAN ASSISTANT

## 2024-03-27 PROCEDURE — 92611 MOTION FLUOROSCOPY/SWALLOW: CPT

## 2024-03-27 PROCEDURE — 85007 BL SMEAR W/DIFF WBC COUNT: CPT

## 2024-03-27 PROCEDURE — 94760 N-INVAS EAR/PLS OXIMETRY 1: CPT

## 2024-03-27 PROCEDURE — 99233 SBSQ HOSP IP/OBS HIGH 50: CPT | Performed by: STUDENT IN AN ORGANIZED HEALTH CARE EDUCATION/TRAINING PROGRAM

## 2024-03-27 PROCEDURE — 83735 ASSAY OF MAGNESIUM: CPT | Performed by: STUDENT IN AN ORGANIZED HEALTH CARE EDUCATION/TRAINING PROGRAM

## 2024-03-27 PROCEDURE — 87811 SARS-COV-2 COVID19 W/OPTIC: CPT | Performed by: STUDENT IN AN ORGANIZED HEALTH CARE EDUCATION/TRAINING PROGRAM

## 2024-03-27 PROCEDURE — NC001 PR NO CHARGE: Performed by: STUDENT IN AN ORGANIZED HEALTH CARE EDUCATION/TRAINING PROGRAM

## 2024-03-27 PROCEDURE — 99233 SBSQ HOSP IP/OBS HIGH 50: CPT | Performed by: PSYCHIATRY & NEUROLOGY

## 2024-03-27 PROCEDURE — 97605 NEG PRS WND THER DME<=50SQCM: CPT | Performed by: PHYSICIAN ASSISTANT

## 2024-03-27 PROCEDURE — 82948 REAGENT STRIP/BLOOD GLUCOSE: CPT

## 2024-03-27 PROCEDURE — 94664 DEMO&/EVAL PT USE INHALER: CPT

## 2024-03-27 PROCEDURE — 85027 COMPLETE CBC AUTOMATED: CPT

## 2024-03-27 PROCEDURE — 80048 BASIC METABOLIC PNL TOTAL CA: CPT

## 2024-03-27 PROCEDURE — 97530 THERAPEUTIC ACTIVITIES: CPT

## 2024-03-27 PROCEDURE — 94640 AIRWAY INHALATION TREATMENT: CPT

## 2024-03-27 PROCEDURE — C9113 INJ PANTOPRAZOLE SODIUM, VIA: HCPCS | Performed by: INTERNAL MEDICINE

## 2024-03-27 PROCEDURE — 97110 THERAPEUTIC EXERCISES: CPT

## 2024-03-27 PROCEDURE — 97112 NEUROMUSCULAR REEDUCATION: CPT

## 2024-03-27 RX ORDER — LACOSAMIDE 10 MG/ML
300 INJECTION, SOLUTION INTRAVENOUS ONCE
Status: COMPLETED | OUTPATIENT
Start: 2024-03-27 | End: 2024-03-27

## 2024-03-27 RX ORDER — MODAFINIL 100 MG/1
100 TABLET ORAL DAILY
Status: DISCONTINUED | OUTPATIENT
Start: 2024-03-27 | End: 2024-03-29

## 2024-03-27 RX ORDER — DEXTROSE MONOHYDRATE 50 MG/ML
75 INJECTION, SOLUTION INTRAVENOUS CONTINUOUS
Status: DISCONTINUED | OUTPATIENT
Start: 2024-03-27 | End: 2024-03-29

## 2024-03-27 RX ORDER — INSULIN GLARGINE 100 [IU]/ML
10 INJECTION, SOLUTION SUBCUTANEOUS
Status: DISCONTINUED | OUTPATIENT
Start: 2024-03-27 | End: 2024-03-27

## 2024-03-27 RX ORDER — ECHINACEA PURPUREA EXTRACT 125 MG
2 TABLET ORAL 2 TIMES DAILY
Status: DISCONTINUED | OUTPATIENT
Start: 2024-03-27 | End: 2024-04-18

## 2024-03-27 RX ORDER — LACOSAMIDE 10 MG/ML
100 SOLUTION ORAL EVERY 12 HOURS SCHEDULED
Status: DISCONTINUED | OUTPATIENT
Start: 2024-03-27 | End: 2024-04-02

## 2024-03-27 RX ORDER — INSULIN LISPRO 100 [IU]/ML
1-6 INJECTION, SOLUTION INTRAVENOUS; SUBCUTANEOUS
Status: CANCELLED | OUTPATIENT
Start: 2024-03-27

## 2024-03-27 RX ORDER — INSULIN GLARGINE 100 [IU]/ML
10 INJECTION, SOLUTION SUBCUTANEOUS EVERY MORNING
Status: DISCONTINUED | OUTPATIENT
Start: 2024-03-27 | End: 2024-03-28

## 2024-03-27 RX ADMIN — LEVALBUTEROL HYDROCHLORIDE 1.25 MG: 1.25 SOLUTION RESPIRATORY (INHALATION) at 07:37

## 2024-03-27 RX ADMIN — TOPIRAMATE 50 MG: 100 TABLET, FILM COATED ORAL at 08:06

## 2024-03-27 RX ADMIN — CHLORHEXIDINE GLUCONATE 0.12% ORAL RINSE 15 ML: 1.2 LIQUID ORAL at 08:00

## 2024-03-27 RX ADMIN — IPRATROPIUM BROMIDE 0.5 MG: 0.5 SOLUTION RESPIRATORY (INHALATION) at 07:37

## 2024-03-27 RX ADMIN — MINOCYCLINE HYDROCHLORIDE 200 MG: 50 TABLET, FILM COATED ORAL at 08:01

## 2024-03-27 RX ADMIN — VENLAFAXINE 12.5 MG: 25 TABLET ORAL at 07:58

## 2024-03-27 RX ADMIN — LEVALBUTEROL HYDROCHLORIDE 1.25 MG: 1.25 SOLUTION RESPIRATORY (INHALATION) at 13:42

## 2024-03-27 RX ADMIN — Medication 2 SPRAY: at 03:25

## 2024-03-27 RX ADMIN — PANTOPRAZOLE SODIUM 40 MG: 40 INJECTION, POWDER, FOR SOLUTION INTRAVENOUS at 08:00

## 2024-03-27 RX ADMIN — NYSTATIN: 100000 POWDER TOPICAL at 18:57

## 2024-03-27 RX ADMIN — Medication 2 SPRAY: at 08:01

## 2024-03-27 RX ADMIN — PANTOPRAZOLE SODIUM 40 MG: 40 INJECTION, POWDER, FOR SOLUTION INTRAVENOUS at 21:04

## 2024-03-27 RX ADMIN — MODAFINIL 100 MG: 100 TABLET ORAL at 11:09

## 2024-03-27 RX ADMIN — SODIUM CHLORIDE SOLN NEBU 3% 4 ML: 3 NEBU SOLN at 07:37

## 2024-03-27 RX ADMIN — NYSTATIN: 100000 POWDER TOPICAL at 12:40

## 2024-03-27 RX ADMIN — METHYLPREDNISOLONE SODIUM SUCCINATE 30 MG: 40 INJECTION, POWDER, FOR SOLUTION INTRAMUSCULAR; INTRAVENOUS at 08:00

## 2024-03-27 RX ADMIN — FILGRASTIM-AAFI 300 MCG: 300 INJECTION, SOLUTION SUBCUTANEOUS at 19:03

## 2024-03-27 RX ADMIN — IPRATROPIUM BROMIDE 0.5 MG: 0.5 SOLUTION RESPIRATORY (INHALATION) at 13:42

## 2024-03-27 RX ADMIN — SODIUM CHLORIDE SOLN NEBU 3% 4 ML: 3 NEBU SOLN at 13:42

## 2024-03-27 RX ADMIN — SODIUM CHLORIDE SOLN NEBU 3% 4 ML: 3 NEBU SOLN at 20:26

## 2024-03-27 RX ADMIN — VENLAFAXINE 12.5 MG: 25 TABLET ORAL at 12:40

## 2024-03-27 RX ADMIN — LAMOTRIGINE 150 MG: 100 TABLET ORAL at 08:11

## 2024-03-27 RX ADMIN — Medication 2 SPRAY: at 21:14

## 2024-03-27 RX ADMIN — INSULIN LISPRO 3 UNITS: 100 INJECTION, SOLUTION INTRAVENOUS; SUBCUTANEOUS at 18:49

## 2024-03-27 RX ADMIN — LACOSAMIDE ORAL SOLUTION 100 MG: 10 SOLUTION ORAL at 21:04

## 2024-03-27 RX ADMIN — VENLAFAXINE 12.5 MG: 25 TABLET ORAL at 18:49

## 2024-03-27 RX ADMIN — METHYLPREDNISOLONE SODIUM SUCCINATE 30 MG: 40 INJECTION, POWDER, FOR SOLUTION INTRAMUSCULAR; INTRAVENOUS at 21:04

## 2024-03-27 RX ADMIN — LEVALBUTEROL HYDROCHLORIDE 1.25 MG: 1.25 SOLUTION RESPIRATORY (INHALATION) at 20:26

## 2024-03-27 RX ADMIN — INSULIN GLARGINE 10 UNITS: 100 INJECTION, SOLUTION SUBCUTANEOUS at 08:01

## 2024-03-27 RX ADMIN — TOPIRAMATE 50 MG: 100 TABLET, FILM COATED ORAL at 19:03

## 2024-03-27 RX ADMIN — CHLORHEXIDINE GLUCONATE 0.12% ORAL RINSE 15 ML: 1.2 LIQUID ORAL at 21:04

## 2024-03-27 RX ADMIN — IPRATROPIUM BROMIDE 0.5 MG: 0.5 SOLUTION RESPIRATORY (INHALATION) at 20:26

## 2024-03-27 RX ADMIN — INSULIN LISPRO 2 UNITS: 100 INJECTION, SOLUTION INTRAVENOUS; SUBCUTANEOUS at 12:40

## 2024-03-27 RX ADMIN — DEXTROSE 50 ML/HR: 5 SOLUTION INTRAVENOUS at 11:09

## 2024-03-27 RX ADMIN — LACOSAMIDE 300 MG: 10 INJECTION, SOLUTION INTRAVENOUS at 18:49

## 2024-03-27 NOTE — PROGRESS NOTES
NEPHROLOGY PROGRESS NOTE   Carla Hunt 59 y.o. female MRN: [id number]  Unit/Bed#: MICU 12 Encounter: [id number]      ASSESSMENT & PLAN     Acute kidney injury likely component of ATN and Bactrim use with impaired creatinine secretion  Creatinine 0.83  Analysis from March 4 shows 4-10 WBCs  Stable  Hypernatremia  Will likely require intermittent D5W administration, currently back up to 148  Is getting 300 cc of free water every 4 hours  Continue to uptitrate per ICU team and monitoring blood work  Metabolic acidosis  Acid-base status improved no anion gap  Stomal site bleeding with bedside procedure  Chronic anemia status post PRBC transfusion  B-cell lymphoma previously on rituximab  Encephalopathy with recent subarachnoid hemorrhage and history of a seizure disorder  Hypoxic respiratory failure currently on a trach collar    The patient's renal function has remained stable, acid-base status stable, hypernatremia is being managed by the ICU team.  Given all the above we will see the patient as needed please call with any questions or concerns    SUBJECTIVE:    No acute events overnight    Medications:    Current Facility-Administered Medications:     acetaminophen (TYLENOL) tablet 650 mg, 650 mg, Oral, Q6H PRN, Moises Bowden MD, 650 mg at 03/26/24 0858    chlorhexidine (PERIDEX) 0.12 % oral rinse 15 mL, 15 mL, Mouth/Throat, Q12H SARTHAK, Moises Bowden MD, 15 mL at 03/27/24 0800    dextrose 5 % infusion, 50 mL/hr, Intravenous, Continuous, Ammar Alam, DO    fentaNYL injection 25 mcg, 25 mcg, Intravenous, Q1H PRN, Ammar Alam, DO    insulin glargine (LANTUS) subcutaneous injection 10 Units 0.1 mL, 10 Units, Subcutaneous, QAM, Ammar Alam, DO    insulin lispro (HumALOG/ADMELOG) 100 units/mL subcutaneous injection 1-5 Units, 1-5 Units, Subcutaneous, 4x Daily, 1 Units at 03/26/24 2114 **AND** Fingerstick Glucose (POCT), , , 4x Daily, Elena Ragab, DO    ipratropium (ATROVENT) 0.02 % inhalation solution 0.5 mg, 0.5 mg,  Nebulization, TID, Moises Bowden MD, 0.5 mg at 03/27/24 0737    lamoTRIgine (LaMICtal) tablet 150 mg, 150 mg, Oral, BID, Moises Bowden MD, 150 mg at 03/27/24 0811    levalbuterol (XOPENEX) inhalation solution 1.25 mg, 1.25 mg, Nebulization, TID, Moises Bowden MD, 1.25 mg at 03/27/24 0737    methylPREDNISolone sodium succinate (Solu-MEDROL) injection 30 mg, 30 mg, Intravenous, BID, Joe Lazcano DO, 30 mg at 03/27/24 0800    modafinil (PROVIGIL) tablet 100 mg, 100 mg, Per NG Tube, Daily, Joe Lazcano DO    nystatin (MYCOSTATIN) powder, , Topical, BID, Moises Bowden MD, Given at 03/26/24 1740    ondansetron (ZOFRAN) injection 4 mg, 4 mg, Intravenous, Q6H PRN, Moises Bowden MD    pantoprazole (PROTONIX) injection 40 mg, 40 mg, Intravenous, Q12H SARTHAK, Moises Bowden MD, 40 mg at 03/27/24 0800    polyethylene glycol (MIRALAX) packet 17 g, 17 g, Per NG Tube, Daily, Moises Bowden MD, 17 g at 03/26/24 0859    sodium chloride (AYR SALINE NASAL) nasal gel 1 Application, 1 Application, Nasal, Q1H PRN, Moises Bowden MD    sodium chloride (OCEAN) 0.65 % nasal spray 2 spray, 2 spray, Each Nare, BID, Emilie Soyeon Kim, MD, 2 spray at 03/27/24 0801    sodium chloride 3 % inhalation solution 4 mL, 4 mL, Nebulization, TID, Moises Bowden MD, 4 mL at 03/27/24 0737    topiramate (TOPAMAX) 6 mg/ml oral suspension 50 mg, 50 mg, Per PEG Tube, BID, Moises Bowden MD, 50 mg at 03/27/24 0806    venlafaxine (EFFEXOR) tablet 12.5 mg, 12.5 mg, Oral, TID With Meals, Moises Bowden MD, 12.5 mg at 03/27/24 0758    OBJECTIVE:    Vitals:    03/27/24 0500 03/27/24 0552 03/27/24 0600 03/27/24 0800   BP: 119/64  106/60 114/69   BP Location:       Pulse: (!) 115  (!) 123 (!) 124   Resp: 18  21 21   Temp:    97.8 °F (36.6 °C)   TempSrc:    Oral   SpO2: 98%  94% 100%   Weight:  72.1 kg (158 lb 15.2 oz)     Height:            Temp:  [97.7 °F (36.5 °C)-98.6 °F (37 °C)] 97.8 °F (36.6 °C)  HR:  [115-128] 124  Resp:  [14-22] 21  BP: (106-122)/(58-74) 114/69  SpO2:  [94 %-100 %] 100 %     Body mass  index is 24.17 kg/m².    Weight (last 2 days)       Date/Time Weight    03/27/24 0552 72.1 (158.95)    03/26/24 0541 72 (158.73)    03/25/24 0537 79 (174.16)            I/O last 3 completed shifts:  In: 4483.1 [I.V.:2360.1; Blood:300; NG/GT:1200; Feedings:623]  Out: 3795 [Urine:2820; Stool:975]    I/O this shift:  In: 110 [I.V.:10; NG/GT:100]  Out: 375 [Urine:275; Stool:100]      Physical exam:    General: no acute distress resting comfortably  Eyes: conjunctivae pink, anicteric sclerae  ENT: lips and mucous membranes moist, no exudates, normal external ears  Neck: ROM intact, no JVD  Chest: No respiratory distress  CVS: Tachycardia  Abdomen: soft, non-tender, non-distended, normoactive bowel sounds  Extremities: no edema of both legs  Skin: no rash    Invasive Devices:   Urethral Catheter Double-lumen (Active)   Amt returned on insertion(mL) 500 mL 03/27/24 0555   Reasons to continue Urinary Catheter  Acute urinary retention/obstruction failing urinary retention protocol 03/27/24 0555   Goal for Removal Will consult urology 03/27/24 0555   Site Assessment Clean;Skin intact 03/27/24 0555   Ortiz Care Done 03/27/24 0900   Collection Container Standard drainage bag 03/27/24 0555   Securement Method Securing device (Describe) 03/27/24 0555   Output (mL) 275 mL 03/27/24 0901     Lab Results:   Results from last 7 days   Lab Units 03/27/24  0545 03/26/24  2046 03/26/24  1438 03/26/24  0548 03/26/24  0026 03/25/24  1234 03/25/24  0835 03/25/24  0523 03/24/24  0437 03/23/24  0519   WBC Thousand/uL 0.91*  --   --  0.90*  --   --  1.20* 1.31* 1.78* 2.43*   HEMOGLOBIN g/dL 7.7*  --   --  8.3*  --   --  8.8* 8.7* 10.4* 9.7*   HEMATOCRIT % 24.8*  --   --  26.8*  --   --  27.8* 27.6* 33.0* 29.9*   PLATELETS Thousands/uL 43*  --   --  27*  --   --  38* 40* 54* 68*   POTASSIUM mmol/L 4.0 3.8 4.2 3.8 4.7   < >  --  4.3 4.4 4.8   CHLORIDE mmol/L 114* 115* 115* 121* 120*   < >  --  124* 119* 116*   CO2 mmol/L 20* 20* 20* 20* 19*   <  ">  --  23 23 21   BUN mg/dL 56* 54* 57* 63* 70*   < >  --  72* 68* 75*   CREATININE mg/dL 0.83 0.81 0.80 0.79 0.81   < >  --  0.89 0.99 1.12   CALCIUM mg/dL 9.3 8.9 8.5 9.1 9.1   < >  --  9.4 9.2 8.8   MAGNESIUM mg/dL 2.0  --   --  2.1  --   --   --  2.4 2.3 2.5   PHOSPHORUS mg/dL 4.6*  --   --  3.9  --   --   --  3.7 3.8 4.8*   ALK PHOS U/L  --   --   --   --   --   --   --   --  115* 98   ALT U/L  --   --   --   --   --   --   --   --  41 40   AST U/L  --   --   --   --   --   --   --   --  13 21    < > = values in this interval not displayed.         Portions of the record may have been created with voice recognition software. Occasional wrong word or \"sound a like\" substitutions may have occurred due to the inherent limitations of voice recognition software. Read the chart carefully and recognize, using context, where substitutions have occurred.If you have any questions, please contact the dictating provider.    "

## 2024-03-27 NOTE — CASE MANAGEMENT
Case Management Discharge Planning Note    Patient name Carla Hunt  Location MICU 12/MICU 12 MRN [id number]  : 1965 Date 3/27/2024       Current Admission Date: 1/10/2024  Current Admission Diagnosis:Acute respiratory failure with hypoxia (HCC)   Patient Active Problem List    Diagnosis Date Noted    Acute kidney injury (HCC) 2024    Cecal volvulus (HCC) 2024    Palliative care patient 2024    Goals of care, counseling/discussion 2024    Hypotension 2024    Seizure disorder (HCC) 2024    Acute respiratory failure with hypoxia ([facility]) 2024    Transaminitis 2024    Teto marginal zone B-cell lymphoma ([facility]) 2024    COVID 2024    Stage 3b chronic kidney disease (CKD) ([facility]) 2024    Abnormal CT of the head 2024    Nephrolithiasis 2021    Encephalopathy 2020    Other specified anxiety disorders 2019    Reactive depression 2019    Hidradenitis suppurativa of left axilla 2019    Anxiety state 2018    Disorder of parathyroid gland ([facility]) 2018    Eczema 2018    Grand mal status ([facility]) 2018    Hypercalcemia 2018    Hypertensive disorder 2018    Open wound of hand except fingers 2018    Otitis externa 2018    Overweight 2018    Pain in face 2018    Skin sensation disturbance 2018    Subjective visual disturbance 2018    Long term current use of hormonal contraceptive 10/23/2018    Rosacea 2015      LOS (days): 77  Geometric Mean LOS (GMLOS) (days):   Days to GMLOS:     OBJECTIVE:  Risk of Unplanned Readmission Score: 23.9         Current admission status: Inpatient   Preferred Pharmacy:   [id number] - [address] [place] [address]  [place] [address]  Phone: [phone] Fax: [phone]    [facility] - [address]  [address]  Phone: [phone] Fax: [phone]    Primary Care Provider: Tony Guevara MD    Primary Insurance: [employer]  Secondary Insurance: MEDICARE    DISCHARGE DETAILS:                         Additional Comments: Patient is not medically cleared. CM team to continue following patient until discharge. CM extended referrals for inpatient rehab facilities.

## 2024-03-27 NOTE — PROGRESS NOTES
Progress Note - Infectious Disease   Carla Hunt 59 y.o. female MRN: [id number]  Unit/Bed#: MICU 12 Encounter: [id number]      Impression/Plan:    1. Acute hypoxic respiratory failure, likely multifactorial, with both COVID and bacterial pneumonia contributing.  Also consider persistent inflammation, fibrosis as seems quite steroid responsive in past.  Most recently extubated 3/1.  Patient with worsening respiratory status requiring intubation again 3/11.  Suspect ongoing hypoxia related to persistent COVID-pneumonia, superimposed bacterial infection, inflammatory component/lung fibrosis related to COVID, now with profound deconditioning and muscle weakness.  Status post bronchoscopy and trach 3/12 with excessive secretions seen from the lower lobes bilaterally.  BAL culture from 3/11 shows heavy growth of Stenotrophomonas maltophilia.  PJP DFA negative. While the patient has grown this before and she certainly may be colonized, given worsening CT findings, intubation and prolonged steroids would treat as a true pathogen.  Status post 10 days of vancomycin and cefepime.  Started on Bactrim 3/13, switched to minocycline 3/18.  Patient completed 14-day course of remdesivir/Paxlovid 3/15.  CRP improving. Status post repeat bronchoscopy 3/17 for airway clearance with continued thick secretions.  Low concern for uncontrolled infection contributing to clinical picture. Respiratory status improving, patient now on trach collar 8L O2. No fevers.   -Completed 14-day course of minocycline this morning, monitor off further antibiotics  -Steroid dosing per critical care, tolerating weaning   -No indication to treat Candida in respiratory culture, this is respiratory colonization  -If clinically deteriorates with concern for progressive sepsis would start meropenem 1g IV Q8   -Fungitell is positive but low clinical concern for invasive fungal infection at this time; recent antibiotics, blood transfusion, candida in sputum  may be causing false positive result.  The patient has been improving without any antifungal therapy and empiric therapy raises risk of further affecting normal lauren.  Will continue to monitor.  -For completeness, follow up histoplasma urine antigen     2. SIRS versus sepsis.  Fluctuating fever, WBC.  Likely due to persistent COVID, bacterial pneumonia. CT C/A/P 3/10 shows stable groundglass and nodular opacities, inflammation around stoma. Now with dehiscence of her abdominal wound following staple removal, status post wound VAC placement 3/13. Patient afebrile, without leukocytosis, weaning on trach collar. No new clinical signs of infection  -Follow temperatures closely  -Recheck WBC in AM to monitor infection  -Supportive care as per the primary service     3. Recurrent bacterial pneumonia.  The patient has completed multiple treatment courses over the hospitalization. Prior BAL cultures with Pseudomonas and stenotrophomonas.  Unclear if pathogens or colonizers.  Status post 10 days levofloxacin.  CT C/A/P showed evolving changes likely all due to sequelae of COVID, infections.  Noted to have worsening hypoxia and purulent secretions on bronchoscopy 3/11.  BAL culture with heavy growth of Stenotrophomonas maltophilia, Candida.  Completed 14-day course of minocycline 3/27              -Monitor off further antibiotics              -Wean O2 as able     4. Severe COVID, present on admission.  Patient was treated with a 10-day course of remdesivir, dexamethasone and was given 1 dose of Tocilizumab on admission.  COVID antigen was negative prior to coming off isolation.  Had positive COVID PCR from BAL 2/16, status post another 5-day course of remdesivir.  Patient has remained on high-dose systemic corticosteroid throughout her hospitalization which were recently intensified 2/26 for fevers.  Patient having persistent fevers, hypoxia.  COVID antigen positive and PCR is also positive 3/3 and Ct 26.8, consistent with  high viral replication.  Repeat PCR positive with Ct now closer to 30. CRP overall decreasing with slow steroid wean.  Status post 14-day course of remdesivir/Paxlovid 3/15/2024.  Rapid COVID antigen negative 3/25 and 3/27  -Steroid wean per ICU  -No additional antivirals indicated  -If remains on high-dose steroids patient will eventually require PJP prophylaxis.  Due to pancytopenia would use atovaquone     5. 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  Patient is status post exploratory laparotomy with ileocecectomy and end ileostomy creation 2/20.   End ileostomy is with ongoing ischemic changes which is likely contributing to the imaging findings and ongoing SIRS response.  Now with wound dehiscence status post staple removal, status post wound VAC placement 3/13, removed but replaced due to bleeding  -Serial exams  -Surgery follow-up   -no current signs of infection, need for antibiotics      B-cell lymphoma, on maintenance rituxan, which is currently postponed.  Rituximab is a risk factor for persistent COVID infection.    7.  ELIESER.  More likely due to hypovolemia since creatinine was increasing prior to starting Bactrim.   -Monitor creatinine closely   -Dose adjust antibiotics as needed    8.  Anemia, thrombocytopenia, lymphopenia/neutropenia.  Patient has had persistent lymphopenia throughout this admission, likely due to rituximab, viral infection, high-dose steroids.  Now with worsening anemia, neutropenia, and thrombocytopenia.  Bactrim discontinued 3/18. CMV PCR negative. Hgb now stable after transfusion but white blood count and platelets continue to worsen.  Not a likely side effect of minocycline.  Immunoglobulins are low   -Steroid wean per primary   -Transfusion support per primary   -Hematology consulted, recommending Granix as unable to perform BM Bx due to positioning, other co morbidities     Above management plan to monitor off further antibiotics discussed with the critical care  team who is in agreement.  ID will continue to follow.    Antibiotics:  Day 10 minocycline (day 14 total)    Subjective:  The patient remains on 8 L trach collar.  Significant aspiration seen on video swallow study today.  She is completing minocycline.  Remains afebrile but with mild neutropenia.    Objective:  Vitals:  Temp:  [97.7 °F (36.5 °C)-98.1 °F (36.7 °C)] 97.8 °F (36.6 °C)  HR:  [114-125] 122  Resp:  [14-22] 20  BP: ()/(56-79) 127/79  SpO2:  [94 %-100 %] 98 %  Temp (24hrs), Av.9 °F (36.6 °C), Min:97.7 °F (36.5 °C), Max:98.1 °F (36.7 °C)  Current: Temperature: 97.8 °F (36.6 °C)    Physical Exam:   General Appearance:  Ill-appearing, arousable   Neck: Trach in place.   Lungs:   Rhonchi bilaterally   Heart:  RRR; no murmur, rub or gallop   Abdomen:   Midline ostomy with brown stool, wound VAC in place   Extremities: No edema   Skin: No new rashes or lesions.        Labs:   All pertinent labs and imaging studies were personally reviewed  Results from last 7 days   Lab Units 24  0545 24  0548 24  0835   WBC Thousand/uL 0.91* 0.90* 1.20*   HEMOGLOBIN g/dL 7.7* 8.3* 8.8*   PLATELETS Thousands/uL 43* 27* 38*     Results from last 7 days   Lab Units 24  0545 24  2046 24  1438 24  0523 24  0437 24  0519   SODIUM mmol/L 146 144 142   < > 150* 150*   POTASSIUM mmol/L 4.0 3.8 4.2   < > 4.4 4.8   CHLORIDE mmol/L 114* 115* 115*   < > 119* 116*   CO2 mmol/L 20* 20* 20*   < > 23 21   BUN mg/dL 56* 54* 57*   < > 68* 75*   CREATININE mg/dL 0.83 0.81 0.80   < > 0.99 1.12   EGFR ml/min/1.73sq m 77 80 81   < > 63 54   CALCIUM mg/dL 9.3 8.9 8.5   < > 9.2 8.8   AST U/L  --   --   --   --  13 21   ALT U/L  --   --   --   --  41 40   ALK PHOS U/L  --   --   --   --  115* 98    < > = values in this interval not displayed.           Results from last 7 days   Lab Units 24  0523 24  0519 24  1232   CRP mg/L 256.9* 249.3* 226.8*                        Imaging:  CXR personally reviewed and shows mild improvement in multifocal bronchopneumonia

## 2024-03-27 NOTE — PROGRESS NOTES
Critical Care Attending Note; Bharat Márquez   Note Date: 24  Note Time: 10:00 AM    Patient: Carla Hunt  Age, : 59 y.o., 1965 MRN: [id number] Code Status: Level 1 - Full Code Patient Location: MICU 12/MICU 12   Hospital LOS:77 days  ]   Patient seen and examined, medical record reviewed, discussed with house staff and nursing staff.     HPI   CC: Respiratory Failure   59F with a PMH of B - Cell Lymphoma(Bendamustine-Rituximab x 6 cycles [21 - 1/3/22], maintenance Rituximab), Epilepsy(Temporal Lobe resection- Complex Migraines) originally admitted for AMS found to have COVID, she has had a very complicated course requiring multiple intubations, volvulus(Ex Lap - ileostomy - wound dehiscence), hemorrhagic shock,  PNAs.    Key Recent Events   : [facility] - AMS, COVID - Steroid protocol  1/10: Transfer [facility] Video EEG  : LP   1/15: LP  : Bronch  : Pulse Dose steroids - 3 days   Intubated - Epistaxis  2/15: IVIG Completed  : Bronch - COVID + Antigen/PCR  : Volvulus - ex lap, ileocecectomy, end ileostomy and mucus fistula creation   :  Bronch   : Bronch  3/1: Extubated  3/11: Re-intubated, Bronch - Stenotrophomonas/Pseudo  3/12: Bronch  3/12: Trach  3/13: BAL - Stenotrophomonas   3/15: 14d course Paxlovid/remdesivir  3/17: Bronch  3/20: Hemorrhagic shock - Ostomy - 2PRBCs - OR -   3/21: Hemorrhage Ostomy/WV- 1 PRBCs - OR -  3/25: No acute events overnight      Main ICU Plans:       #Neuro  Anxiety/Alertness  - Venlanfaxine  - Restart Modafinil    Seizure disorder  - Lamictal, Topimax  - Neurology Consult - discuss removal of AED due to pancytopenia    #Pulm  Hypoxic Respiratory Failure - COVID, Pneumonitis, Rituximab - Improved but failed weaning steroids. Extended steroid course and has been on steroids for > 1 month. Plan for slower wean from steroids to monitor for improvement   - TCT daily  - Wean  Solumedro 30 q12hr - hold for 1 week      #Renal  Hypernatremia   - Increase FWF  - D5W restart    Ortiz - Failed voiding trial    #GI  Volvulus -   - General surgery following  - Ostomy/Wound Care    Severe Protein Calorie malnutrition - Patient unlikely to maintain enough oral intake to keep up with calorie demand  - Recommend PEG once stable  - Swallow eval   - Aggressive PT/OT      #ID   Stenotrophomonas PNA  - ID Consulted - Minocycline - Completed 14 day course  - Restart PCP PPX     COVID PNA - Extended steroid/antiviral course requiring pulse dose steroids with improvement in respiratory function. Treatment difficult due to underlying immunosuppression  - Steroids as above  - Repeat covid test      #MSK  Steroid/Critical Care Neuropathy  - PT/OT     #Heme  Anemia - concerns for PUD(no melena, BRPR, hematemesis), maybe due to BM suppression  - GI Consulted - holding off on EGD     Thrombocytopenia - Likely consumptive/BM suppression - possibly due to anti-epileptics, doxycycline  - Follow MAGDIEL workup   - Heme consulted - Plan for BMB  - PLT ~ 50     #Endo  DM  - Lantus 10 units sq  - ISS    #DVT/GI ppx  Hold SQH     #Lines/Tubes/Drains:   Invasive Devices       Peripheral Intravenous Line  Duration             Long-Dwell Peripheral IV (Midline) 03/08/24 Left Cephalic Vein 18 days    Peripheral IV 03/26/24 Right;Ventral (anterior) Forearm 1 day              Drain  Duration             Ileostomy RUQ 35 days    NG/OG/Enteral Tube Nasogastric 14 Fr Right nare 21 days    Urethral Catheter Double-lumen <1 day              Airway  Duration             Surgical Airway Shiley Cuffed 14 days                    #Nutrition:   Diet Enteral/Parenteral; Tube Feeding No Oral Diet; Vital 1.5; Continuous; 45; Prosource Protein Liquid - One Packet; OD; Refugio - One Packet; BID; 300; Water; Every 4 hours        #Code Status:   Level 1 - Full Code    #Dispo:   ICU        Bharat Márquez MD  Pulmonary, Critical Care    Critical care time, excluding procedures,  "teaching, family meetings, and excludes any prior time recorded by the AP/resident, 35 minutes. Upon my evaluation, this patient has a high probability of imminent or life-threatening deterioration due to above problems which required my direct attention, intervention, and personal management.   Impression/Active Problems:    Acute hypoxic respiratory failure ventilator dependent- s/p tracheostomy  Bilateral ground glass opacities- likely persistent PNA and superimposed bacterial infection  Persistent COVID Pna with superimposed bacterial pna- w/ stenotrophomonas  Acute blood loss anemia- from stoma site  Hemorrhagic shock  Severe encephalopathy- possibly due to uremia  Cutaneous ulcer- below the trach site, likely due to pressure.   Stenotrophomonas PNA  Sepsis- 2/2 persistent pneumonia  Volvulus- post hemicolectomy s/p cecal ostomy. Noted stomal retraction  Shock- unclear etiology  Thrombocytopenia  Bcell lymphoma- on rituximab  Steroid induced hyperglycemia  Minimal SAH POA  Seizure disorder  Upper extremity thrombophlebitis  Severe lymphopenia  Hx of migraines- s/p left temporal lobectomy  Hypogammaglobulinemia  Critical illness myopathy      Physical Exam:     Vital Signs:   Weight: 72.1 kg (158 lb 15.2 oz)  IBW: Ideal body weight: 63.9 kg (140 lb 14 oz)  Adjusted ideal body weight: 67.2 kg (148 lb 1.7 oz)  Temp:  [97.7 °F (36.5 °C)-98.6 °F (37 °C)] 98.1 °F (36.7 °C)  HR:  [115-128] 124  Resp:  [14-22] 21  BP: (106-122)/(58-74) 114/69  FiO2 (%):  [35] 35  Physical Exam: unchanged  General: NAD  Neuro: Alert, following commands  Heart: RRR  Lungs: Basilar crackles  Abdomen: Midline - Wound vac - CDI, Ostomy, NT  Extremities: Edema                Ventilator Settings:    FiO2 (%):  [35] 35          Invalid input(s): \"PCO2\", \"O2\"    Radiologic Images Reviewed:    CXR  Mild prominent interstitial markings. No pneumothorax or pleural effusion.     Normal cardiomediastinal silhouette.     Bones are unremarkable for " age.     Normal upper abdomen.     IMPRESSION:     Mild congestive changes.    CT C/A/P  IMPRESSION:  1. Persistent bilateral groundglass and nodular consolidation with peripheral opacification at the right greater than left lung bases. Findings remain concerning for multi lobar infiltrates with possible superimposed COVID-19 opacities.  2. Status post ileocolectomy with a right lower quadrant ileostomy again exhibiting a patent stoma. Persistent inflammatory change and subcutaneous emphysema after recent intervention. No drainable collection.  3. Bilateral renal calcifications again suggestive of medullary sponge kidney with persistent mild right renal fullness but no evidence of distal obstructive pathology.        Input / Output:     Intake/Output Summary (Last 24 hours) at 3/27/2024 1000  Last data filed at 3/27/2024 0901  Gross per 24 hour   Intake 997.05 ml   Output 2320 ml   Net -1322.95 ml            Infusions:       Scheduled Medications:  Current Facility-Administered Medications   Medication Dose Route Frequency Provider Last Rate    acetaminophen  650 mg Oral Q6H PRN Moises Bowden MD      chlorhexidine  15 mL Mouth/Throat Q12H [facility] Moises Bowden MD      fentaNYL  25 mcg Intravenous Q1H PRN Joe Lazcano DO      insulin glargine  10 Units Subcutaneous HS Joe Lazcano DO      insulin lispro  1-5 Units Subcutaneous 4x Daily Elena Ragab, DO      ipratropium  0.5 mg Nebulization TID Moises Bowden MD      lamoTRIgine  150 mg Oral BID Moises Bowden MD      levalbuterol  1.25 mg Nebulization TID Moises Bowden MD      methylPREDNISolone sodium succinate  30 mg Intravenous BID Joe Lazcano DO      nystatin   Topical BID Moises Bowden MD      ondansetron  4 mg Intravenous Q6H PRN Moises Bowden MD      pantoprazole  40 mg Intravenous Q12H [facility] Moises Bowden MD      polyethylene glycol  17 g Per NG Tube Daily Moises Bowden MD      sodium chloride  1 Application Nasal Q1H PRN Moises Bowden MD      sodium chloride  2 spray Each Nare BID Emilie Soyeon  "MD Grisel      sodium chloride  4 mL Nebulization TID Moises Bowden MD      topiramate  50 mg Per PEG Tube BID Moises Bowden MD      venlafaxine  12.5 mg Oral TID With Meals Moises Bowden MD         PRN Medications:    acetaminophen    fentaNYL    ondansetron    sodium chloride    Labs Reviewed:  Results from last 7 days   Lab Units 03/27/24  0545 03/26/24  0548 03/25/24  0835   WBC Thousand/uL 0.91* 0.90* 1.20*   HEMOGLOBIN g/dL 7.7* 8.3* 8.8*   HEMATOCRIT % 24.8* 26.8* 27.8*   PLATELETS Thousands/uL 43* 27* 38*      Results from last 7 days   Lab Units 03/27/24  0545 03/26/24  2046 03/26/24  1438 03/26/24  0548 03/25/24  1234 03/25/24  0523   SODIUM mmol/L 146 144 142 152*   < > 156*   CO2 mmol/L 20* 20* 20* 20*   < > 23   BUN mg/dL 56* 54* 57* 63*   < > 72*   CALCIUM mg/dL 9.3 8.9 8.5 9.1   < > 9.4   MAGNESIUM mg/dL 2.0  --   --  2.1  --  2.4   PHOSPHORUS mg/dL 4.6*  --   --  3.9  --  3.7    < > = values in this interval not displayed.         Invalid input(s): \"ASTSGOT\", \"ALTSGPT\"LABRCNTIP@ ,alkphos:3,tbilirubin:3,dbilirubin:3)@  Results from last 7 days   Lab Units 03/21/24  1332   INR  1.49*           Invalid input(s): \"TROPT\", \"PBNP\"             I have personally seen and examined the patient on (03/27/24 between 1879-2439). I discussed the patient with the AP/resident including, but not limited to, verifying findings; reviewing labs and x-rays; discussing with consultants; developing the plan of care with the bedside nurse; and discussing treatment plan with patient or surrogate.  I have reviewed the note and assessment performed by the AP/resident and agree with the AP/resident’s documented findings and plan of care with the above additions/exceptions. Please see my comments for details and adjustments.                 "

## 2024-03-27 NOTE — PLAN OF CARE
Problem: MOBILITY - ADULT  Goal: Maintain or return to baseline ADL function  Description: INTERVENTIONS:  -  Assess patient's ability to carry out ADLs; assess patient's baseline for ADL function and identify physical deficits which impact ability to perform ADLs (bathing, care of mouth/teeth, toileting, grooming, dressing, etc.)  - Assess/evaluate cause of self-care deficits   - Assess range of motion  - Assess patient's mobility; develop plan if impaired  - Assess patient's need for assistive devices and provide as appropriate  - Encourage maximum independence but intervene and supervise when necessary  - Involve family in performance of ADLs  - Assess for home care needs following discharge   - Consider OT consult to assist with ADL evaluation and planning for discharge  - Provide patient education as appropriate  Outcome: Progressing     Problem: MOBILITY - ADULT  Goal: Maintains/Returns to pre admission functional level  Description: INTERVENTIONS:  - Perform AM-PAC 6 Click Basic Mobility/ Daily Activity assessment daily.  - Set and communicate daily mobility goal to care team and patient/family/caregiver.   - Collaborate with rehabilitation services on mobility goals if consulted  - Perform Range of Motion 4 times a day.  - Reposition patient every 2 hours.  - Dangle patient 1 times a day  - Record patient progress and toleration of activity level   Outcome: Progressing

## 2024-03-27 NOTE — UTILIZATION REVIEW
"Continued Stay Review    Date: 03/27                          Current Patient Class: IP  Current Level of Care: Level 2 Stepdown/HOT    HPI:59 y.o. female initially admitted on 01/10     Assessment/Plan: Patient noted to have leak from ostomy bag, now sealed. Ortiz placed back after patient was retaining. On trach collar @ 8L.  plan for VBS today. Cont Slow steroid wean: currently 20 BID day. Awaiting neuro recs, consider alternative for Lamictal as it may ne contributing to marrow suppression. Free water flushes to 300cc q4h, Tf held for planned IR bx today. Plt improving s/p 1 u plt, not at goal >50K.  continue Lantus 10U qHS. Advance SSI to algo3.    Onc Med Notes: Patient was going to have bone marrow test for pancytopenia but that could not be done for technical reasons because patient could not lie prone. Plan was to give Granix for leukopenia with neutropenia after the bone marrow test but now she will have it without the bone marrow test. Cont to mon plts, H&H, transfuse as needed.      Vital Signs: /79   Pulse (!) 118   Temp 97.8 °F (36.6 °C) (Oral)   Resp 18   Ht 5' 8\" (1.727 m)   Wt 72.1 kg (158 lb 15.2 oz)   SpO2 99%   BMI 24.17 kg/m²       Pertinent Labs/Diagnostic Results:       Results from last 7 days   Lab Units 03/27/24  0545 03/26/24  0548 03/25/24  0835 03/25/24  0523 03/24/24  0437   WBC Thousand/uL 0.91* 0.90* 1.20* 1.31* 1.78*   HEMOGLOBIN g/dL 7.7* 8.3* 8.8* 8.7* 10.4*   HEMATOCRIT % 24.8* 26.8* 27.8* 27.6* 33.0*   PLATELETS Thousands/uL 43* 27* 38* 40* 54*   NEUTROS ABS Thousands/µL  --   --  0.94*  --   --    TOTAL NEUT ABS Thousand/uL  --   --   --  1.07*  --    BANDS PCT % 46* 32*  --  30*  --      Results from last 7 days   Lab Units 03/25/24  0523   RETIC CT ABS  28,400   RETIC CT PCT % 1.01     Results from last 7 days   Lab Units 03/27/24  0545 03/26/24  2046 03/26/24  1438 03/26/24  0548 03/26/24  0026 03/25/24  1234 03/25/24  0523 03/24/24  0437 03/23/24  0519   SODIUM " "mmol/L 146 144 142 152* 151*   < > 156* 150* 150*   POTASSIUM mmol/L 4.0 3.8 4.2 3.8 4.7   < > 4.3 4.4 4.8   CHLORIDE mmol/L 114* 115* 115* 121* 120*   < > 124* 119* 116*   CO2 mmol/L 20* 20* 20* 20* 19*   < > 23 23 21   ANION GAP mmol/L 12 9 7 11 12   < > 9 8 13   BUN mg/dL 56* 54* 57* 63* 70*   < > 72* 68* 75*   CREATININE mg/dL 0.83 0.81 0.80 0.79 0.81   < > 0.89 0.99 1.12   EGFR ml/min/1.73sq m 77 80 81 82 80   < > 71 63 54   CALCIUM mg/dL 9.3 8.9 8.5 9.1 9.1   < > 9.4 9.2 8.8   MAGNESIUM mg/dL 2.0  --   --  2.1  --   --  2.4 2.3 2.5   PHOSPHORUS mg/dL 4.6*  --   --  3.9  --   --  3.7 3.8 4.8*    < > = values in this interval not displayed.     Results from last 7 days   Lab Units 03/25/24  0523 03/24/24  0437 03/23/24  0519   AST U/L  --  13 21   ALT U/L  --  41 40   ALK PHOS U/L  --  115* 98   TOTAL PROTEIN g/dL  --  5.0* 4.6*   ALBUMIN g/dL  --  2.5* 2.3*   TOTAL BILIRUBIN mg/dL 0.53 0.43 0.45     Results from last 7 days   Lab Units 03/27/24  1145 03/26/24  2100 03/26/24  1150 03/26/24  0953 03/26/24  0801 03/26/24  0559 03/26/24  0413 03/26/24  0214 03/26/24  0013 03/25/24  2151 03/25/24  1959 03/25/24  1814   POC GLUCOSE mg/dl 244* 186* 223* 216* 163* 158* 142* 140 132 142* 155* 174*     Results from last 7 days   Lab Units 03/27/24  0545 03/26/24  2046 03/26/24  1438 03/26/24  0548 03/26/24  0026 03/25/24  1234 03/25/24  0523 03/24/24  0437 03/23/24  0519 03/22/24  0524 03/21/24  1332 03/21/24  0552   GLUCOSE RANDOM mg/dL 263* 179* 202* 179* 116 177* 152* 154* 194* 176* 247* 132             No results found for: \"BETA-HYDROXYBUTYRATE\"   Results from last 7 days   Lab Units 03/21/24  1512 03/21/24  0018   PH ART  7.374 7.339*   PCO2 ART mm Hg 32.6* 33.6*   PO2 ART mm Hg 81.7 91.4   HCO3 ART mmol/L 18.6* 17.7*   BASE EXC ART mmol/L -5.8 -7.2   O2 CONTENT ART mL/dL 14.4* 14.5*   O2 HGB, ARTERIAL % 95.1 95.5   ABG SOURCE  Line, Arterial Line, Arterial                         Results from last 7 days   Lab " Units 03/21/24  1332   PROTIME seconds 17.8*   INR  1.49*   PTT seconds 40*             Results from last 7 days   Lab Units 03/21/24  1332   LACTIC ACID mmol/L 0.8                         Results from last 7 days   Lab Units 03/27/24  0555 03/22/24  0844 03/22/24  0555   UNIT PRODUCT CODE  M0516Q22 [id number]  [id number]  M7108L19  D9018N94 M1285X33   UNIT NUMBER  [id number]  [id number]  [id number]  [id number]   UNITABO  A A  A  A  A A   UNITRH  POS NEG  NEG  NEG  NEG POS   CROSSMATCH   --  Compatible  Compatible  Compatible  Compatible  --    UNIT DISPENSE STATUS  Presumed Trans Return to Inv  Return to Inv  Return to Inv  Presumed Trans Presumed Trans   UNIT PRODUCT VOL mL 300 350  350  350  350 248             Results from last 7 days   Lab Units 03/25/24  0523 03/23/24  0519 03/21/24  1232   CRP mg/L 256.9* 249.3* 226.8*             Results from last 7 days   Lab Units 03/25/24  0523   TOTAL COUNTED  100               Medications:   Scheduled Medications:  chlorhexidine, 15 mL, Mouth/Throat, Q12H SARTHAK  Filgrastim-aafi, 300 mcg, Subcutaneous, Daily  insulin glargine, 10 Units, Subcutaneous, QAM  insulin lispro, 1-5 Units, Subcutaneous, 4x Daily  ipratropium, 0.5 mg, Nebulization, TID  lacosamide, 300 mg, Intravenous, Once  lacosamide, 100 mg, Oral, Q12H SARTHAK  levalbuterol, 1.25 mg, Nebulization, TID  methylPREDNISolone sodium succinate, 30 mg, Intravenous, BID  modafinil, 100 mg, Per NG Tube, Daily  nystatin, , Topical, BID  pantoprazole, 40 mg, Intravenous, Q12H SARTHAK  polyethylene glycol, 17 g, Per NG Tube, Daily  sodium chloride, 2 spray, Each Nare, BID  sodium chloride, 4 mL, Nebulization, TID  topiramate, 50 mg, Per PEG Tube, BID  venlafaxine, 12.5 mg, Oral, TID With Meals      Continuous IV Infusions:  dextrose, 50 mL/hr, Intravenous, Continuous      PRN Meds:  acetaminophen, 650 mg, Oral, Q6H PRN  fentaNYL, 25 mcg, Intravenous, Q1H  PRN  ondansetron, 4 mg, Intravenous, Q6H PRN  sodium chloride, 1 Application, Nasal, Q1H PRN        Discharge Plan: TBD    Network Utilization Review Department  ATTENTION: Please call with any questions or concerns to [phone] and carefully listen to the prompts so that you are directed to the right person. All voicemails are confidential.   For Discharge needs, contact Care Management DC Support Team at [phone] opt. 2  Send all requests for admission clinical reviews, approved or denied determinations and any other requests to dedicated fax number below belonging to the campus where the patient is receiving treatment. List of dedicated fax numbers for the Facilities:  FACILITY NAME UR FAX NUMBER   ADMISSION DENIALS (Administrative/Medical Necessity) [phone]   DISCHARGE SUPPORT TEAM (NETWORK) [phone]   PARENT CHILD HEALTH (Maternity/NICU/Pediatrics) [phone]   [facility] [phone]   General acute hospital [phone]   [facility] [phone]   [facility] [phone]   [facility] [phone]   [facility] [phone]   General acute hospital [phone]   [facility] [phone]   [facility] [phone]   [facility] [phone]   [facility] [phone]   [facility] [phone]

## 2024-03-27 NOTE — PROCEDURES
Acute Care Surgery  Bedside V.A.C. Procedure Note    A timeout was performed with the patient's nurse, Donna, prior to beginning the dressing change. The nurse remained present to confirm the correct dressing counts on removal of the VAC dressing and was debriefed at the completion of the dressing change.    Location of wound: Midline abdomen    Dressings and Foam removed:  1 oil emulsion gauze dressing  2 Black Foam - 1 was removed from the wound and the second was outside the wound used as a bridge to the lateral abdominal wall.    Dimensions of wound: 10 cm x 4 cm x 4 cm    Description of wound: On inspection of the wound today, some of the sidewalls of the wound base have begun to granulate.  There was still stool contamination of the midline wound, but the sutures placed to help close down the communication between the midline wound and the peristomal wound remain in place and the communication has remained much smaller.  After copious irrigation, the the midline wound base did appear otherwise clean, pink and healthy with intact fascia. There was no necrotic or nonviable tissue requiring debridement.  The periwound skin remains clean, mildly excoriated, but otherwise intact.    VAC dressing application:  The periwound skin was cleaned and dried.  One piece of oil emulsion gauze dressing was placed at the wound base followed by 1 piece of white foam bordering the side of the wound that communicates with the peristomal wound; finally 1 piece of black foam cut to size and placed into the wound. The dressings were then covered with VAC drape. Additional VAC drape and black foam was used to create a bridge to the patient's left lateral abdominal wall and a base for the track pad. The track pad was then placed over the base of black foam. The VAC was then set to 125 mmHg low Continuous suction. The patient tolerated the procedure well and there were no complications. The patient did not require any excisional  debridement during today's dressing change.    Once the VAC dressing was in place and no leak was observed, a new 1 piece ostomy appliance was applied.    VAC settings:  125 mmHg  Continuous    Wound Images:        Additional Notes:  The VAC sticker placed over the dressing per protocol.  The next VAC dressing change will be planned for Friday, 3/29/2024.    This dressing change took greater than 20 minutes to complete.    Ajit Gr PA-C  3/27/2024 2:42 PM

## 2024-03-27 NOTE — PLAN OF CARE
Problem: PHYSICAL THERAPY ADULT  Goal: Performs mobility at highest level of function for planned discharge setting.  See evaluation for individualized goals.  Description:    Equipment Recommended:  (none)       See flowsheet documentation for full assessment, interventions and recommendations.  Outcome: Not Progressing  Note: Prognosis: Guarded  Problem List: Decreased strength, Decreased range of motion, Decreased endurance, Impaired balance, Decreased mobility, Decreased cognition, Decreased coordination, Impaired judgement, Decreased safety awareness, Decreased skin integrity, Pain  Assessment: Pt seen for PT treatment session w/ interventions consisting of bed mobility training, sitting balance instruction, LE ther ex instruction, + t/f training. Pt lethargic initially, more alert throughout session w/ mobility. Pt required encouragement to participate in ther ex - limited to 5 LAQ ea LE throughout 1/2 available ROM. Pt dependent x3 w/ t/f this session. Deferred further t/f attempts at pt tachy to 130's-140's. From a PT standpoint continue to recommend rehab upon d/c.  Barriers to Discharge: None     Rehab Resource Intensity Level, PT: I (Maximum Resource Intensity)    See flowsheet documentation for full assessment.

## 2024-03-27 NOTE — PROCEDURES
Speech Pathology - Modified Barium Swallow Study    Patient Name: Carla Hunt    Today's Date: 3/27/2024     Problem List  Principal Problem:    Acute respiratory failure with hypoxia (HCC)  Active Problems:    Anxiety state    Encephalopathy    COVID    Stage 3b chronic kidney disease (CKD) (HCC)    Teto marginal zone B-cell lymphoma (HCC)    Seizure disorder (HCC)    Hypotension    Palliative care patient    Goals of care, counseling/discussion    Acute kidney injury (HCC)    Cecal volvulus (HCC)    Past Medical History  Past Medical History:   Diagnosis Date    Hx of hypercalcemia     Lymphoma (HCC) 2021    Parathyroid disease (HCC)     Seizures (HCC)     Situational depression     24 yr old son  from drug overdose     Past Surgical History  Past Surgical History:   Procedure Laterality Date    CRANIOTOMY FOR TEMPORAL LOBECTOMY Left     HI LAPS ABD PRTM&OMENTUM DX W/WO SPEC BR/WA SPX N/A 2024    Procedure: LAPAROSCOPY DIAGNOSTIC,EXPLORATORY LAPAROTOMY, RIGHT KARY COLECTOMY,ILEOSTOMY, MUCUS FISTULA;  Surgeon: Lauryn Ullrich, DO;  Location: [facility];  Service: General       Assessment Summary:    Pt presents with severe oropharyngeal dysphagia. Oral dysphagia is characterized by reduced bolus control with anterior and posterior spillage, limited AP transfer, and incomplete oral clearance. Pharyngeal dysphagia is characterized by delayed swallow evaluation, reduced/incomplete hyolaryngeal rise, reduced base of tongue retraction, absent pharyngeal stripping wave, and incomplete airway protection/closure. Pt does have a positive cough response to aspiration, however cough is not productive in clearing aspirated material from the airway. Trials offered om MBS were limited today d/t significant aspiration. ST will f/u for treatment and rehabilitation, however pt will need alternative nutrition in the meantime. Note: Images are available for review in PACS as desired.    Recommendations:    Recommended Diet: NPO with tube feeds   Recommended Form of Medications:  non oral    Aspiration precautions and compensatory swallowing strategies:  general TF precautions  SLP Dysphagia therapy recommended: Yes. Will speak to physicians for potential to provide neuromuscular electrical stimulation of the swallow (NMES/VitalStim).    Results Reviewed with: patient, RN, and family       General Information;  Pt is a 59 y.o. female with a PMH remarkable for multiple and prolonged intubations now s/p trach (#8.0 cuffed shiley, cuff deflated). She is able to wear PMV and breathe comfortably however she remains aphonic. PO trials have been given sparingly at bedside, with considerable s/s aspiration. MBS was recommended to assess oropharyngeal stage swallowing skills at this time.       Pt was viewed sitting upright in the lateral and AP positions. Due to concerns for patient safety trials provided deviated from the MBSImP Validated Protocol. Pt was given 5-mL thin liquid x2, 5-mL nectar thick, and 5-mL honey thick. AP images not obtained.    Initial view observations/comments: NGT in place, trach visible, clear view of the upper airway      8-Point Penetration-Aspiration Scale   Thin liquid 7 - Material enters the airway, passes below the vocal folds, and is not ejected from the trachea despite effort     Nectar thick liquid 7 - Material enters the airway, passes below the vocal folds, and is not ejected from the trachea despite effort     Honey thick liquid 7 - Material enters the airway, passes below the vocal folds, and is not ejected from the trachea despite effort     Puree (pudding) N/a   Solid N/a     Aspiration Response and Efficacy: Positive cough response, incomplete airway clearance    [facility] IMP Rating    ORAL Impairment  Compinent 1--Lip Closure  Judged at any point during the swallow.  4 - Escape beyond mid-chin    Component 2--Tongue Control During Bolus Hold  Judged on held liquid boluses only and prior  to productive tongue movement.   3 - Posterior escape of greater than half of bolus    Component 3--Bolus Preparation/Mastication  Judged only during presentation of 1/2 shortbread cookie coated in pudding.   N/a    Component 4--Bolus Transport/Lingual Motion  Judged after first productive tongue movement for oral bolus transport.  3 - Repetitive/disorganized tongue motion    Component 5--Oral Residue  Judged after first swallow or after the last swallow of the sequential swallow task.  3 - Majority of bolus remaining   Location   B - Palate, C - Tongue, and D - Lateral Sulci    Component 6--Initiation of Pharyngeal Swallow  Judged at first movement of the brisk superior-anterior hyoid trajectory.  3 - Bolus head in pyriforms      PHARYNGEAL Impairment  Component 7--Soft Palate Elevation  Judged during maximum displacement of soft palate.  1 - Trace column of contrast or air between the SP and PW    Component 8--Laryngeal Elevation  Judged when epiglottis is in its most horizontal position.  1 - Partial superior movement of thyroid cartilage/partial approximation of arytenoids to epiglottic petiole    Component 9--Anterior Hyoid Excursion  Judged at height of swallow/maximal anterior hyoid displacement.  1 - Partial anterior movement    Component 10--Epiglottic Movement  Judged at height of swallow/maximal anterior hyoid displacement.  2 - No inversion    Component 11--Laryngeal Vestibular Closure  Judged at height of swallow/maximal anterior hyoid displacement.  2 - None; wide column air/contrast in laryngeal vestibule    Component 12--Pharyngeal Stripping Wave  Judged during the full duration of the pharyngeal swallow.  2 - Absent    Component 13--Pharyngeal Contraction  Judged in AP view at rest and throughout maximum movement of structures.  N/a    Component 14--Pharyngoesophageal Segment Opening  Judged during maximum distension of PES and throughout opening and closure.  2 - Minimal distension/minimal  duration; marked obstruction of flow    Component 15--Tongue Base (TB) Retraction  Judged during maximum retraction of the tongue base.  3 - Wide column of contrast or air between TB and PW    Component 16--Pharyngeal Residue  Judged after first swallow or after the last swallow of the sequential swallow task.  4 - Minimal to no pharyngeal clearance   Location   A - Tongue Base and F - Diffuse (>3 areas)      ESOPHAGEAL Impairment  Component 17--Esophageal Clearance Upright Position  Judged in AP view during bolus transit through the oral cavity to the LES  N/a

## 2024-03-27 NOTE — CONSULTS
Duplicate Neurology consult request.  Please refer to initial consult note on 1/8/2024 by Norma Alfaro PA-C and Dr. Lionel Jacobs.  Progress note to follow.

## 2024-03-27 NOTE — PROGRESS NOTES
Patient's  present in the room.  Patient was going to have bone marrow test for pancytopenia but that could not be done for technical reasons because patient could not lie prone.  Has that she has tracheostomy and cervical collar oxygen and nasogastric feeding.  She is on the vent.  Plan was to give Granix for leukopenia with neutropenia after the bone marrow test but now she will have it without the bone marrow test.  I discussed this with MICU team and patient's  and they were okay with that.  Leukopenia/neutropenia could be secondary to marrow infiltration or suppression from medication or patient's underlying present medical problems.  Actually leukopenia could have been more if she was not on steroids.  Steroid and stress of present medical condition and reaction to hemorrhagic shock could also explain  bandemia.  Patient will be tried on Granix.   Patient has multifactorial anemia and that could again be secondary to marrow infiltration, renal failure and acute blood loss with surgery.  and patient's present condition.  Patient has history of june marginal zone lymphoma, diagnosed in 2021 and patient had 6 cycles of Bendamustine plus Rituxan followed by maintenance Rituxan once every 2 months.  Thrombocytopenia also secondary to above reasons.  Patient's counts are being monitored and she is being supported with blood and platelet transfusions as needed clinically.  MICU team was going to check with neurologist if Lamictal could be changed to another seizure medication.  Discussed with patient's  and Dr. Lazcano, medical resident, part of MICU team.

## 2024-03-27 NOTE — PROGRESS NOTES
[facility]  Progress Note: Critical Care  Name: Carla Hunt 59 y.o. female I MRN: [id number]  Unit/Bed#: MICU 12 I Date of Admission: 1/10/2024   Date of Service: 3/27/2024 I Hospital Day: 77    Assessment/Plan   Acute hypoxic respiratory failure; s/p tracheostomy 3/12  Bilateral ground glass opacities, persistent, improving  Persistent COVID-19 infection; s/p 14d course of antivirals completed 3/15, superimposed by #4;  Stenotrophomonas PNA; recurrent, previously on Bactrim, switched to minocycline 2/2 ELIESER now resolved, will continue ABX through 3/27  Pancytopenia- multifactorial including hx b-cell lymphoma, persistent inflammation  Acute on chronic anemia- multifactorial-intermittent blood loss from ostomy site, chronic inflammation, iatrogenic, marrow dysfunction in setting of persistent inflammation   Lymphopenia with bandemia, in setting of high dose corticosteroids  Severe Metabolic encephalopathy, multifactorial including ?uremia, improving  Uremia, ?catabolic from steroids, improving  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; complicated by poor wound healing  Wound dehiscence 2/2 poor wound healing s/p wound vac 3/13  B-Cell lymphoma, s/p chemotheraphy 2022, Rituxan maintenance therapy on hold 2/2 persistent covid-19  Minimal SAH POA, no interventions required, resolved on repeat CTH  Seizure disorder; on home AEDs  Steroid induced hyperglycemia;on insulin  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  -Most recent repeat CTH 3/13 negative for acute intracranial findings.  Has had extensive neurological  workup including MRI/CT neuroimaging, LP with unremarkable findings  -Etiology unclear. Has been off sedation. Electrolytes, sodium, BS at goal. Ammonia normal. May be prolonged 2/2 PNA, possibly worsened by uremic encephalopathy worsened by ELIESER, ?UGIB, however GFR not <15; candida growth seen on BAL Cx from 3/11, may be respiraotry colonization, however fungal infection not ruled in setting of severe lymphopenia, depressed immunoglobulins.   Plan:  -Avoid PRN fentanyl/sedative meds as able.  -D/C modafinil due to clinical improvement in overall alertness, persistent tachycardia, anxiety  -Slow steroid wean: currently 20 BID day 2/7.  -ABX tx with PNA as below  -Frequent neurochecks   -Defer MRI brain due to clinical improvement, not likely to      Diagnosis: seizure disorder, known history  -S/p partial left temporal lobe resection in 2007 2/2 ?complex migraines  -On Lamictal 150 bid and Topamax 50mg bid,   -Last lamotrigine level from 03/02 at 10.7 (therapeutic)  -Will consider alternative to Lamictal as it may be contributing to marrow suppression, awaiting neuro recc due to hx of complex seizures previously refractory to AEDs requiring temporal lobe lobectomy 2007     Diagnosis: Anxiety, known history  -On Effexor 12.5 mg TID     CV:  -Diagnosis: Sinus tachycardia  -In setting of blood loss anemia/hypovolemia, improving with PRBC transfusions and IVF  -TTE 3/17 with no major structural dysfunction  -Multifactorial 2/2 deconditioning, dehydration/hypvol, acute on chronic anemia, underlying infectious/inflammatory process, pain, Modafinal-induced(currently held)  Plan:  - See plans under Pulm, Heme/Onc, ID    Pulm:  Diagnosis: Acute hypoxic respiratory failure  Diagnosis: Bilateral ground glass opacities, persistent  -Vent dependent; s/p trach 3/12 after was reintubated 3/11 due to increased WOB,elevated CRP  -Persistently COVID+ s/p multiple courses of antivirals (most recent completed 3/15).  Complicated by recurrent bacterial PNA treated w 10d levaquin (completed 2/25) for MDR PNA; most recent BAL +recurrent stenotrophomona treated with Bactrim, swithced to minocyline today 2/2 ELIESER  BAL also with 4+ candida albican; serum fungitell elevated but no s/s systemic fungal infection, may be false elevation from abx  -Etiology Likely multifactorial including possible COVID pneumonitis vs recurrent PNA vs hypersensitivity pneumonitis 2/2 ?rituxumab. Persistent inflammation likely given patient has been steroid responsive throughout admission.   -CRP increasing but likely from intraabdominal inflammation as patient is noted to have clinical improvement with weaning of steroids, ABx.  -Repeat CXR 3/22 with significant improvement of multifocal bronchopneumonia  -Repeat COVID ag test 3/25 negative   Plan:  -Finished 14d course of Paxlovid/remdesivir on 3/15  -Repeat COVID ag 48h from prior, if continues to be negative, d/c isolation  -Follow up anti-Rituximab ab  -Continue minocycline 200mg bid via NGT to tx Stenotrophomonas PNA as below  -Steroid wean- IV Solu-Medrol 30 mg bid x7d course, followed by 20mg BID x7d.  -Hold off on starting empiric antifungal given clinical improvement  -Continue trach collar, wean O2 as able     GI:  Diagnosis: Partial cecal volvulus s/p right hemicolectomy with ostomy pouch in place  -Complicated by poor wound healing of midline incision as evidenced by wound dehiscence s/p wound vac that was removed 3/17 by gen surg.  -Stoma with dark brown output, consistent with prior  Plan:  -Wound vac as per general surgery  -Monitor ostomy pouch output  -Surgery following, will keep them updated if any further changes     :  Diagnosis: Hypernatremia, improving with D5  Sodium 149 today corrected for BS  -Free water flushes to 300cc q4h, Tf held for planned IR bx today     Diagnosis: uremia, improving  Trending down  -?catabolic from steroids, may also have ?slow UGIB in setting of prolonged  steroids though no overt s/s of GIB and H&H stable since 3/23 and patient is on ppi  -Trending down  -Will consider GI consultation for potential EGD inpatient if clinical s/s UGIB     Diagnosis: CKD III,   -Baseline Cr  0.8-1.2  -Cr now stable and at baseline.   -UO adequate, on Ortiz, draining clear yellow urine  -Prerenal azotemia 2/2 uremia,   Plan:  -Trend daily BMP  -Continue to monitor I/O and UOP  -Avoid nephrotoxins, contrast dye as able  -See plan under uremia     F/E/N:  F: none  E: monitor and replete for goal K>4, P>3, Mg>2  N: tube feeds with vital 1.5; 45 cc/h, Prosource liquid protein to 1 packet BID, Refugio BID to support wound healing   -Failed bedside speech,  VBS today pending repeat COVID ag remains negative on 3/27     Heme/Onc:  Diagnosis: Pancytopenia  -In setting of hx of B cell lymphoma, prior chemo, Rituxan therapy, prior abx and present meds including lamictal  -Hemo onc consulted, plan for IR bone marrow bx to rule out primary marrow disorder  -Plan for G-CSF as per heme-onc post IR bx  -Will consider alternatives to Lamictal for hx of seizures in consultation with neuro as above   -Trend CBC and diff daily, neutropenic precautions for ANC <500    Diagnosis: Acute on chronic anemia  -Baseline Hgb ~7-8. S/P 2U PRBCs 3/17 after repeat Hgb 5.3, total of 6U transfuse  d since admission.  -Patient had significant blood loss from ostomy site 3/20, resulting in hemorraghic shock, improved with blood transfusion; hemostasis achieved after bleeding source identified and sutured. Another large vol danie bloody output from osotomy on 3/21, bleeding source within ostomy site, sutured.    -Hgb dropping today 8.7, despite hypernatremia/being dry  -May still have slow GIB due to persistent uremia, GI previously consulted for EGD but was deferred due to hemodynamic instability at the time  -?Worsened by impaired marrow response liekly 2/2 persistent inflammation due to low retic index ~1 prior to most  recent transfusions; normocytic with normal B12/folate levels; MCV<100. Iatrogenic cause likely contributing 2/2 frequent blood draws; chronic anemia likely persistent inflammatory state/CKD/lymphoma in setting of elevated ferritin of 974. SPEP/UPEP negative. Hemolysis workup negative.   Plan:  -Routine monitoring ostomy output, if bleeding, apply direct pressure and contact gen surg STAT for hemostasis .  -Continue tube feeds/nutritional support  -Trend CBC, transfuse to maintain Hgb>7  -See plan under pancytopenia     Diagnosis: Thrombocytopenia  -Improving s/p 1U plt, not at goal >50k  -In setting of infectious state, known history of B-cell lymphoma.  May also have component of marrow dysfunction 2/2 persistent inflammatory state. No liver dysfunction. No known history of vWD. .  -Has been on heparin product inpatient, 4T score suggestive of intermiediate probability of HIT at 14%  Plan:  -Heparin-induced antibody pending  -Hold Lovenox for DVT ppx, resume if Plt>50k  -See plan under pancytopenia      Diagnosis Lymphopenia with bandemia  -In setting of high dose steroids  -Severely depressed immunoglobulins inclding IgG, IgA, IgM  -At risk for further infections  -Contact and airborne precautions    Diagnosis: B cell lymphoma  -Follows with heme[facility]onc at [facility]  -S/P 6 cycles of chemotherapy in 20222  -Maintained on Rituxan for 2 year course PTA, started May 2022, last dose was 12/2024, treatment currently on hold in setting of persistent COVID-19 infection  Plan:  -Holding rituximab in setting of persistent COVID-19 infection, ?resume if repeat COVID ag & anti-ritux ab negative in consultation with heme-onc  -Follow up anti-Rituximab ab to assess for drug induced hypersensitivity syndrome     DVT ppx: hold lovenox 2/2 thrombocytopenia     Endo:  Diagnosis: steroid hyperglycemia and diabetes mellitus type 2, A1c at 6.6 from 01/2024  -Goal -180  -BG range last 24hrs 170s-263  Plan:  -Continue Lantus 10U  qHS  -Advance SSI to algo3     ID:  Diagnosis: Recurrent bacterial pneumonia  - Patient has completed multiple treatment courses of remdesivir throughout hospitalization in addition to 7 days of ceftriaxone.  - BAL cultures in 2/2024 positive for MDR Pseudomonas and stenotrophomonas treated with 10d course of Levaquin  - CT C/A/P 3/10 with persistent groundglass opacities and changes suggestive of sequelae of COVID, prior infections.  Completed 10d course of cefepime for broad spectrum coveragge (completed (3/13)  -Repeat BAL cultures from 3/11 showing 4+ growth Stenotrophomonas maltophilia. Could be colonization given prior BAL growth of same, however due to recent intubation with prolonged corticosteroid theraphy, will begin treating as true pathogen. Patient otherwise remains afebrile, no leukocytosis. CRP with down trending.  Previously on Bactrim from 3/13 to 3/18, discontinued due to worsening ELIESER  Plan:  -Continue minocycline 200mg bid, via NGT; hold TF 1hr pre and post minocycline adminstration.  -Plan to treat for 14d course through 3/26 w/ abx  -IV steroids as above     MSK/Skin:  Diagnosis: Midline incision wound with poor wound healing-  -General surgery performed staple removal of the patient's midline incision on 3/12 with some skin signs appreciated at the inferior aspect of the wound without obvious pus drainage. Overnight 3/13, wound dehiscence progressed requiring general surgery reevaluation. Red/brown aspirate noted, fascia intact. Wet-to-dry dressings in place. General surgery following for next Epson wound care.  -Poor wound healing in setting of high-dose steroid therapy.  No  exam no obvious signs of infection at this time.   -S/P wound vac replacement 3/13 as per gen surgery. No active concerns for cellulitis.  Plan:  -Wound VAC management as per general surgery  -Wound care for peritracheostomy wound  -Abdominal wound care as per general surgery  -Routine monitoring     Diagnosis: Critical  illness myopathy  -Likely exacerbated by high-dose steroid therapy  Plan:  -Continue to wean steroids as above  -Aggressive PT/OT         Disposition: Critical care    ICU Core Measures     Vented Patient  VAP Bundle  VAP bundle ordered     A: Assess, Prevent, and Manage Pain Has pain been assessed? Yes  Need for changes to pain regimen? NA   B: Both Spontaneous Awakening Trials (SATs) and Spontaneous Breathing Trials (SBTs) Plan to perform spontaneous awakening trial today? N/A   Plan to perform spontaneous breathing trial today? N/A   Obvious barriers to extubation? NA   C: Choice of Sedation RASS Goal: N/A patient not on sedation  Need for changes to sedation or analgesia regimen? NA   D: Delirium CAM-ICU: Negative   E: Early Mobility  Plan for early mobility? Yes   F: Family Engagement Plan for family engagement today? Yes       Antibiotic Review: Patient on appropriate coverage based on culture data.  and Infectious disease consulted    Review of Invasive Devices:    Ortiz Plan: voiding trial today        Prophylaxis:  VTE Contraindicated secondary to: Plt <50   Stress Ulcer  covered bypantoprazole (PROTONIX) injection 40 mg [[id number]]        Significant 24hr Events     24hr events:  Patient noted to have leak from ostomy bag, now sealed. Ortiz placed back after patient was retaining.      Subjective   Patient denies any pain or discomfort via head nodding.     Review of Systems   Unable to perform ROS: Acuity of condition        Objective                            Vitals I/O      Most Recent Min/Max in 24hrs   Temp 98.1 °F (36.7 °C) Temp  Min: 97.7 °F (36.5 °C)  Max: 98.7 °F (37.1 °C)   Pulse (!) 123 Pulse  Min: 115  Max: 136   Resp 21 Resp  Min: 14  Max: 23   /60 BP  Min: 106/60  Max: 141/74   O2 Sat 94 % SpO2  Min: 94 %  Max: 99 %     LDA  Peripherals (R, L)    Ileostomy RUQ 35d, draining bilious o/p  Wound vac  NGT 21d  Ortiz  Surgical airway , cuffed 13d   Intake/Output Summary (Last 24 hours) at  3/27/2024 0705  Last data filed at 3/27/2024 0555  Gross per 24 hour   Intake 2134.97 ml   Output 2345 ml   Net -210.03 ml       Diet Enteral/Parenteral; Tube Feeding No Oral Diet; Vital 1.5; Continuous; 45; Prosource Protein Liquid - One Packet; OD; Refugio - One Packet; BID; 300; Water; Every 4 hours    Invasive Monitoring           Physical Exam   Physical Exam  Eyes:      Pupils: Pupils are equal, round, and reactive to light.   Skin:     General: Skin is warm and dry.   HENT:      Nose: No epistaxis.      Mouth/Throat:      Mouth: Mucous membranes are moist.   Neck:      Trachea: Tracheostomy present.   Cardiovascular:      Rate and Rhythm: Regular rhythm. Tachycardia present.      Heart sounds: No murmur heard.  Musculoskeletal:         General: No swelling.      Right lower leg: No edema.      Left lower leg: No edema.   Abdominal: General: An ostomy site is present. There is ostomy site.     Palpations: Abdomen is soft.      Tenderness: There is no abdominal tenderness.      Comments: Wound vac   Constitutional:       Appearance: She is ill-appearing.      Interventions: She is not sedated and not intubated.  Pulmonary:      Effort: No accessory muscle usage, respiratory distress or accessory muscle usage. She is not intubated.      Breath sounds: Normal breath sounds. No wheezing.   Neurological:      General: No focal deficit present.      Mental Status: She is alert.      Comments: Able to follow commands with head nodding,   Weak  strength BUE and Unable to assess strength due to profound weakness in all extremities    Genitourinary/Anorectal:     Comments: Ortiz draining clear yellow urine  Ortiz present.          Diagnostic Studies      EKG: no new  Imaging: no new I have personally reviewed pertinent reports.       Medications:  Scheduled PRN   chlorhexidine, 15 mL, Q12H SARTHAK  insulin glargine, 10 Units, QAM  insulin lispro, 1-5 Units, 4x Daily  ipratropium, 0.5 mg, TID  lamoTRIgine, 150 mg,  BID  levalbuterol, 1.25 mg, TID  methylPREDNISolone sodium succinate, 30 mg, BID  minocycline, 200 mg, BID  nystatin, , BID  pantoprazole, 40 mg, Q12H SARTHAK  polyethylene glycol, 17 g, Daily  sodium chloride, 2 spray, BID  sodium chloride, 4 mL, TID  topiramate, 50 mg, BID  venlafaxine, 12.5 mg, TID With Meals      acetaminophen, 650 mg, Q6H PRN  fentaNYL, 25 mcg, Q1H PRN  ondansetron, 4 mg, Q6H PRN  sodium chloride, 1 Application, Q1H PRN       Continuous            Labs:    CBC    Recent Labs     03/26/24  0548 03/27/24  0545   WBC 0.90* 0.91*   HGB 8.3* 7.7*   HCT 26.8* 24.8*   PLT 27* 43*   BANDSPCT 32*  --      BMP    Recent Labs     03/26/24 2046 03/27/24  0545   SODIUM 144 146   K 3.8 4.0   * 114*   CO2 20* 20*   AGAP 9 12   BUN 54* 56*   CREATININE 0.81 0.83   CALCIUM 8.9 9.3       Coags    No recent results     Additional Electrolytes  Recent Labs     03/26/24  0548 03/27/24  0545   MG 2.1 2.0   PHOS 3.9 4.6*          Blood Gas    No recent results  No recent results LFTs  No recent results      Infectious  No recent results     COVID ag 3/25 negative  COVID ag 3/27 pending    BAL 3/11   --4+ steno malto., 2+ candida  --Viral Cx neg  --Fungal 4+ candida  CMV neg  PCP smear neg  COVID pcr + on 3/11 Glucose  Recent Labs     03/26/24  0548 03/26/24  1438 03/26/24 2046 03/27/24  0545   GLUC 179* 202* 179* 263*               Joe Lazcano DO

## 2024-03-27 NOTE — PROGRESS NOTES
Progress Note - Neurology   Carla Hunt 59 y.o. female [id number]  Unit/Bed#: MICU 12/MICU 12    Assessment/Plan:    Encephalopathy  Assessment & Plan  59 y.o. 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 Patient found to have COVID infection and ELIESER in ED. CTH with bifrontal hyperdensities concerning for hemorrhage vs calcifications.  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    1/8: Neurology consulted with concern for focal seizures in setting of Covid infection and worsening renal function.   1/9: loaded with Vimpat 400 mg IV x 1 and Topiramate increased to 150 mg twice daily.   1/10: Continued to be encephalopathic with periods of agitation/combativeness. Afebrile since 1/9 afternoon. No overt seizure-like activity noted by family or staff. On high-flow oxygen, remdesivir and steroids for underlying COVID. Renal function improved. MRI brain w/wo negative for stroke. Patient transferred to [facility] neuro ICU for ongoing management/video EEG monitoring (see below).    3/13: Neurology was reconsulted due to patient not following commands or moving any extremity.  Repeat CT head unremarkable.  High suspicion for TME given prolonged hospitalization, infection, and cefepime use.  3/27: Neurology was reconsulted due to worsening pancytopenia and concern that this was related to  "Lamictal.  Will discontinue Lamictal and start Vimpat. (Previously on Vimpat at the beginning of her hospitalization due to being NPO and not being able to take her oral home AEDs.  No reported side effects).    Neurodiagnostics:   CTA H/N wwo contrast 1/7/24:   \"No acute arterial vascular abnormality in the head or neck. No flow-limiting large vessel arterial vascular stenosis in the head or neck. Tiny punctate foci of relative increased parenchymal density in the frontal lobes bilaterally, unchanged from 2 hours earlier and probably representing low-density parenchymal calcifications rather than parenchymal hemorrhage. Conservative management with an additional follow-up noncontrast head CT is recommended in 24 to 48 hours. Reidentified left temporal lobe resection. Dense basal ganglia and cerebellar calcifications again noted. Groundglass and consolidative airspace opacities most suspicious for extensive pneumonia seen throughout the visualized mid and upper lung zones more dense on the right than on the left.\"  MRI brain wo 1/9/24:  Tiny focus of abnormal diffusion signal within the right basal ganglia involving the anterior limb of the internal capsule not clearly restricted on the ADC maps but suspicious for small acute lacunar infarct.\"   MRI brain seizure wo and w contrast 1/10/24:  \"Previously visualized diffusion signal abnormality in the right basal ganglia is no longer identified and may have been artifactual given underlying susceptibility artifact in the bilateral globus pallidus.\"  Repeat CTH 3/6 and 3/9 with no acute changes.  VEEG (1/11 - 1/13; 57 hours):   Disorganized theta delta activity suggestive of moderate diffuse cerebral dysfunction.  The episode of groaning and nonspecific movements of extremities is nonepileptic in etiology.  No electrographic seizures.  LP 1/12: attempted with low flow CSF, patient very restless. Initially clear fluid then blood tinged. Procedure aborted. Limited CSF " results: M/E panel wnl, WBC 1, protein 50, Gram stain with no polys or bacteria  LP 1/15: WBC 1, protein 50, RBC 25, glucose 113, ME negative, HSV PCR negative, Gram stain/culture negative    Relevant home meds:  Lamictal 200 mg BID  Topiramate 100 mg BID    Hospital AEDs:  Vimpat 200 mg BID started  due to being NPO and unable to take home AEDs; 400mg IV x1 . Decreased to 150 mg BID  secondary to elevated level. Since discontinued.  Topiramate 100 mg BID (decreased on 3/15 to 50 mg BID)  Lamotrigine 150 mg BID  Lamotrigine level elevated at 21.8, dosing adjusted/decreased to 150mg bid (home dose 200mg bid) with level 9.5 as of 3/7/24    Plan:  - No further neuroimaging needed at this time  - Given pancytopenia, stop Lamictal  - Load with Vimpat 300 mg once  - Start maintenance Vimpat 100 mg Q12 hours  - Continue Topiramate 50 mg BID  - Continue provigil 100 mg daily  - Delirium precautions   - Continue to monitor and notify neurology with any changes.   - Medical management and supportive care per primary team. Correction of any metabolic or infectious disturbances        Carla Hunt will need follow up in 6-8 weeks with epilepsy attending .  She will not require outpatient neurological testing.         Subjective:   Patient seen and examined with attending neurologist.  Neurology was reconsulted due to worsening pancytopenia/leukopenia and concern that this is related to her Lamictal.  Patient remains nonverbal throughout the exam.  She is awake and intermittently follows some simple commands.  Diffuse weakness.        Past Medical History:   Diagnosis Date    Hx of hypercalcemia     Lymphoma (HCC) 2021    Parathyroid disease (HCC)     Seizures (HCC)     Situational depression     24 yr old son  from drug overdose     Past Surgical History:   Procedure Laterality Date    CRANIOTOMY FOR TEMPORAL LOBECTOMY Left     FL LAPS ABD PRTM&OMENTUM DX W/WO SPEC BR/WA SPX N/A 2024     Procedure: LAPAROSCOPY DIAGNOSTIC,EXPLORATORY LAPAROTOMY, RIGHT KARY COLECTOMY,ILEOSTOMY, MUCUS FISTULA;  Surgeon: Lauryn Ullrich, DO;  Location: [facility];  Service: General     Family History   Problem Relation Age of Onset    Diabetes Mother     Cancer Father      Social History     Socioeconomic History    Marital status: /Civil Union     Spouse name: None    Number of children: None    Years of education: None    Highest education level: None   Occupational History    None   Tobacco Use    Smoking status: Never    Smokeless tobacco: Never   Vaping Use    Vaping status: Never Used   Substance and Sexual Activity    Alcohol use: Not Currently    Drug use: Never    Sexual activity: None   Other Topics Concern    None   Social History Narrative    None     Social Determinants of Health     Financial Resource Strain: Not on file   Food Insecurity: No Food Insecurity (1/11/2024)    Hunger Vital Sign     Worried About Running Out of Food in the Last Year: Never true     Ran Out of Food in the Last Year: Never true   Transportation Needs: No Transportation Needs (1/11/2024)    PRAPARE - Transportation     Lack of Transportation (Medical): No     Lack of Transportation (Non-Medical): No   Physical Activity: Not on file   Stress: Not on file   Social Connections: Not on file   Intimate Partner Violence: Not on file   Housing Stability: Low Risk  (1/11/2024)    Housing Stability Vital Sign     Unable to Pay for Housing in the Last Year: No     Number of Places Lived in the Last Year: 1     Unstable Housing in the Last Year: No     E-Cigarette/Vaping    E-Cigarette Use Never User      E-Cigarette/Vaping Substances    Nicotine No     THC No     CBD No          Medications:  All current active meds have been reviewed and current meds:  Scheduled Meds:  Current Facility-Administered Medications   Medication Dose Route Frequency Provider Last Rate    acetaminophen  650 mg Oral Q6H PRN Moises Bowden MD      chlorhexidine   "15 mL Mouth/Throat Q12H [facility] Moises Bowden MD      dextrose  50 mL/hr Intravenous Continuous Amdandre Lazcano, DO 50 mL/hr (03/27/24 1109)    fentaNYL  25 mcg Intravenous Q1H PRN Amdandre Landaciara, DO      Filgrastim-aafi  300 mcg Subcutaneous Daily Elena Ragab, DO      insulin glargine  10 Units Subcutaneous QAM Ammar Cordellciara, DO      insulin lispro  1-5 Units Subcutaneous 4x Daily Elena Ragab, DO      ipratropium  0.5 mg Nebulization TID Moises Bowden MD      lamoTRIgine  150 mg Oral BID Moises Bowden MD      levalbuterol  1.25 mg Nebulization TID Moises Bowden MD      methylPREDNISolone sodium succinate  30 mg Intravenous BID Amdandre Lazcano, DO      modafinil  100 mg Per NG Tube Daily Joe Lazcano, DO      nystatin   Topical BID Moises Bowden MD      ondansetron  4 mg Intravenous Q6H PRN Moises Bowden MD      pantoprazole  40 mg Intravenous Q12H [facility] Moises Bowden MD      polyethylene glycol  17 g Per NG Tube Daily Moises Bowden MD      sodium chloride  1 Application Nasal Q1H PRN Moises Bowden MD      sodium chloride  2 spray Each Nare BID Emilie Soyeon Kim, MD      sodium chloride  4 mL Nebulization TID Moises Bowden MD      topiramate  50 mg Per PEG Tube BID Moises Bowden MD      venlafaxine  12.5 mg Oral TID With Meals Moises Bowden MD       Continuous Infusions:dextrose, 50 mL/hr, Last Rate: 50 mL/hr (03/27/24 1109)      PRN Meds:.  acetaminophen    fentaNYL    ondansetron    sodium chloride       ROS:   Review of Systems   Unable to perform ROS: Patient nonverbal             Vitals:   /79   Pulse (!) 118   Temp 97.8 °F (36.6 °C) (Oral)   Resp 18   Ht 5' 8\" (1.727 m)   Wt 72.1 kg (158 lb 15.2 oz)   SpO2 99%   BMI 24.17 kg/m²     Physical Exam:   Physical Exam  Vitals and nursing note reviewed.   Constitutional:       Comments: Chronically ill-appearing female lying in bed awake and alert.   HENT:      Head: Normocephalic and atraumatic.      Mouth/Throat:      Comments: Trach in place  Eyes:      Extraocular Movements: Extraocular movements intact. " "     Pupils: Pupils are equal, round, and reactive to light.   Pulmonary:      Effort: Pulmonary effort is normal.   Musculoskeletal:      Comments: Diffuse weakness in all extremities   Skin:     General: Skin is warm and dry.   Neurological:      Deep Tendon Reflexes:      Reflex Scores:       Brachioradialis reflexes are 0 on the right side and 0 on the left side.       Patellar reflexes are 1+ on the right side.       Achilles reflexes are 0 on the right side and 0 on the left side.     Comments: See full neuro exam below       Neurologic Exam     Mental Status   Patient awake and alert.  When asked the month, she moves her mouth, but no audible verbal output  Intermittently follows some simple midline and appendicular commands     Cranial Nerves     CN III, IV, VI   Pupils are equal, round, and reactive to light.  Pupils equal, round, reactive to light bilaterally.  EOMs intact.  Inconsistent blink to threat bilaterally.  No obvious facial asymmetry.  Patient nods head \"yes\" when asked if facial sensation is intact bilaterally     Motor Exam   2/5 strength in BUEs  Trace R hip adduction and L ankle movement (1-2/5 strength in distal legs)     Sensory Exam   Nods head \"yes\" when asked if she can feel light touch in all extremities     Gait, Coordination, and Reflexes     Gait  Gait: (unable to assess)    Reflexes   Right brachioradialis: 0  Left brachioradialis: 0  Right biceps reflex grade: trace.  Left biceps reflex grade: trace.  Right patellar: 1+  Left patellar reflex grade: trace.  Right achilles: 0  Left achilles: 0  No resting tremor  No ankle clonus bilaterally  Downgoing toes bilaterally           Labs: I have personally reviewed pertinent reports.   Recent Results (from the past 24 hour(s))   Basic metabolic panel    Collection Time: 03/26/24  8:46 PM   Result Value Ref Range    Sodium 144 135 - 147 mmol/L    Potassium 3.8 3.5 - 5.3 mmol/L    Chloride 115 (H) 96 - 108 mmol/L    CO2 20 (L) 21 - 32 " mmol/L    ANION GAP 9 4 - 13 mmol/L    BUN 54 (H) 5 - 25 mg/dL    Creatinine 0.81 0.60 - 1.30 mg/dL    Glucose 179 (H) 65 - 140 mg/dL    Calcium 8.9 8.4 - 10.2 mg/dL    eGFR 80 ml/min/1.73sq m   Fingerstick Glucose (POCT)    Collection Time: 03/26/24  9:00 PM   Result Value Ref Range    POC Glucose 186 (H) 65 - 140 mg/dl   Magnesium    Collection Time: 03/27/24  5:45 AM   Result Value Ref Range    Magnesium 2.0 1.9 - 2.7 mg/dL   Phosphorus    Collection Time: 03/27/24  5:45 AM   Result Value Ref Range    Phosphorus 4.6 (H) 2.7 - 4.5 mg/dL   CBC and differential    Collection Time: 03/27/24  5:45 AM   Result Value Ref Range    WBC 0.91 (L) 4.31 - 10.16 Thousand/uL    RBC 2.51 (L) 3.81 - 5.12 Million/uL    Hemoglobin 7.7 (L) 11.5 - 15.4 g/dL    Hematocrit 24.8 (L) 34.8 - 46.1 %    MCV 99 (H) 82 - 98 fL    MCH 30.7 26.8 - 34.3 pg    MCHC 31.0 (L) 31.4 - 37.4 g/dL    RDW 17.1 (H) 11.6 - 15.1 %    MPV 12.4 8.9 - 12.7 fL    Platelets 43 (L) 149 - 390 Thousands/uL    nRBC 0 /100 WBCs   Basic metabolic panel    Collection Time: 03/27/24  5:45 AM   Result Value Ref Range    Sodium 146 135 - 147 mmol/L    Potassium 4.0 3.5 - 5.3 mmol/L    Chloride 114 (H) 96 - 108 mmol/L    CO2 20 (L) 21 - 32 mmol/L    ANION GAP 12 4 - 13 mmol/L    BUN 56 (H) 5 - 25 mg/dL    Creatinine 0.83 0.60 - 1.30 mg/dL    Glucose 263 (H) 65 - 140 mg/dL    Calcium 9.3 8.4 - 10.2 mg/dL    eGFR 77 ml/min/1.73sq m   Manual Differential(PHLEBS Do Not Order)    Collection Time: 03/27/24  5:45 AM   Result Value Ref Range    Segmented % 49 43 - 75 %    Bands % 46 (H) 0 - 8 %    Lymphocytes % 2 (L) 14 - 44 %    Monocytes % 0 (L) 4 - 12 %    Eosinophils % 0 0 - 6 %    Basophils % 0 0 - 1 %    Metamyelocytes % 3 (H) 0 - 1 %    Absolute Neutrophils 0.86 (L) 1.85 - 7.62 Thousand/uL    Absolute Lymphocytes 0.02 (L) 0.60 - 4.47 Thousand/uL    Absolute Monocytes 0.00 0.00 - 1.22 Thousand/uL    Absolute Eosinophils 0.00 0.00 - 0.40 Thousand/uL    Absolute Basophils 0.00  0.00 - 0.10 Thousand/uL    Total Counted      nRBC 5 (H) 0 - 2 /100 WBC    RBC Morphology Present     Platelet Estimate Decreased (A) Adequate    Anisocytosis Present     Macrocytes Present     Ovalocytes Present    Prepare Leukoreduced Platelet Pheresis (1 pheresis product = 6-8 pooled units): 1 Product, Irradiated    Collection Time: 03/27/24  5:55 AM   Result Value Ref Range    Unit Product Code [id number]     Unit Number [id number]     Unit ABO A     Unit RH POS     Unit Dispense Status Presumed Trans     Unit Product Volume 300 mL   POCT COVID 19 Rapid Antigen    Collection Time: 03/27/24 11:29 AM   Result Value Ref Range    POCT COVID19 Antigen Negative Negative    Control Valid    POCT COVID 19 Rapid Antigen in 48 hours    Collection Time: 03/27/24 11:29 AM   Result Value Ref Range    POCT COVID19 Antigen Negative Negative    Control Valid    Fingerstick Glucose (POCT)    Collection Time: 03/27/24 11:45 AM   Result Value Ref Range    POC Glucose 244 (H) 65 - 140 mg/dl       Imaging: I have personally reviewed pertinent imaging in PACS, including CT head, MRI brain, repeat CT head, vEEG reports,  and I have personally reviewed PACS reports.     EKG, Pathology, and Other Studies: I have personally reviewed pertinent reports.       VTE Prophylaxis: Sequential compression device (Venodyne)

## 2024-03-27 NOTE — OCCUPATIONAL THERAPY NOTE
Occupational Therapy Treatment Note      Carla Davidsonnidia    3/27/2024    Principal Problem:    Acute respiratory failure with hypoxia (HCC)  Active Problems:    Anxiety state    Encephalopathy    COVID    Stage 3b chronic kidney disease (CKD) (HCC)    Teto marginal zone B-cell lymphoma (HCC)    Seizure disorder (HCC)    Hypotension    Palliative care patient    Goals of care, counseling/discussion    Acute kidney injury (HCC)    Cecal volvulus (HCC)      Past Medical History:   Diagnosis Date    Hx of hypercalcemia     Lymphoma (HCC) 2021    Parathyroid disease (HCC)     Seizures (HCC)     Situational depression     24 yr old son  from drug overdose       Past Surgical History:   Procedure Laterality Date    CRANIOTOMY FOR TEMPORAL LOBECTOMY Left     CO LAPS ABD PRTM&OMENTUM DX W/WO SPEC BR/WA SPX N/A 2024    Procedure: LAPAROSCOPY DIAGNOSTIC,EXPLORATORY LAPAROTOMY, RIGHT KARY COLECTOMY,ILEOSTOMY, MUCUS FISTULA;  Surgeon: Lauryn Ullrich, DO;  Location: [facility];  Service: General        24 1102   OT Last Visit   OT Visit Date 24   Note Type   Note Type Treatment   Pain Assessment   Pain Assessment Tool 0-10   Pain Score No Pain   Restrictions/Precautions   Weight Bearing Precautions Per Order No   Braces or Orthoses Other (Comment)  (B/L resting hand splints & prevalon boots)   Other Precautions Airborne/isolation;Contact/isolation;Cognitive;Bed Alarm;Aspiration;Multiple lines;Telemetry;O2;Fall Risk;Pain  (trach, trach collar, marie, VAC, NGT; COVID (+))   Lifestyle   Autonomy I adls and mobility - i iadls - shares homemaking with family   Reciprocal Relationships supportive family   Service to Others volunteers   Intrinsic Gratification Enjoys spending time with her family   ADL   Eating Assistance Unable to assess   Grooming Assistance 1  Total Assistance   UB Bathing Assistance 1  Total Assistance   LB Bathing Assistance 1  Total Assistance   UB Dressing Assistance 1  Total  Assistance   LB Dressing Assistance 1  Total Assistance   Toileting Assistance  1  Total Assistance   Bed Mobility   Supine to Sit 2  Maximal assistance   Additional items Assist x 2;Assist x 3;HOB elevated;Increased time required;Verbal cues;LE management   Sit to Supine 1  Dependent   Additional items Assist x 2;Assist x 3;Increased time required;Verbal cues;LE management   Additional Comments pt sat EOB w/ Max A for sitting balance/trunk control; pt needed repetitive VC for head/neck extension and manual support to initiate extension position  . Sat mainly w/ head/neck flexed. Eyes intermittently open.   Transfers   Sit to Stand 1  Dependent   Additional items Assist x 3;Increased time required;Verbal cues   Stand to Sit 1  Dependent   Additional items Assist x 3;Increased time required;Verbal cues   Additional Comments B/L HHA and knee block; 3rd person for safety. Noted to be in intermittent Vtach/tachycardic in 130s-140s. Deferred further standing trials and returned to supine in bed. Placed in chair position at end of session w/ all needs in reach.   Therapeutic Exercise - ROM   UE-ROM Yes   ROM- Right Upper Extremities   R Shoulder PROM;Flexion;Extension   R Elbow PROM;Elbow flexion;Elbow extension   R Wrist PROM   R Hand PROM   R Position Seated   R Weight/Reps/Sets x10   RUE ROM Comment Pt given Max VC to initiate muscle contraction/engagement in exercises. 1/5 strength noted intermittently w/ shoulder shrugs.   ROM - Left Upper Extremities    L Shoulder PROM;Flexion;Extension   L Elbow PROM;Elbow flexion;Elbow extension   L Wrist PROM   L Hand PROM   L Position Seated   L Weight/Reps/Sets x10   LUE ROM Comment Pt given Max VC to initiate muscle contraction/engagement in exercises. 1/5 strength noted intermittently w/ shoulder shrugs.   Neuromuscular Education   Head/Neck Control Max VC/TC for head/neck extension   Trunk Control Max A for EOB sitting balance/midline positioning.   Subjective   Subjective  nonverbal   Cognition   Overall Cognitive Status Impaired   Arousal/Participation Arousable;Lethargic;Poorly responsive   Attention Difficulty attending to directions   Orientation Level Unable to assess   Memory Unable to assess   Following Commands Follows one step commands inconsistently   Comments pt is lethargic; intermittently opens eyes; nonverbal throughout. Occasionally, slightly nods head yes/no but not consistently or appropriately to questions. Per pt's spouse, she is more withdrawn today as compared to previous days. Pt demonstrating poor motivation for participation in therapy at this time.   Activity Tolerance   Activity Tolerance Patient limited by fatigue   Medical Staff Made Aware PT, RN   Assessment   Assessment Patient participated in Skilled OT session 3/27/2024 with interventions consisting of ADL re training with the use of correct body mechnaics, Energy Conservation techniques, deep breathing technique, safety awareness and fall prevention techniques, therapeutic exercise to: increase functional use of BUEs, increase BUE muscle strength ,  therapeutic activities to: increase activity tolerance, neuromuscular re education to: facilitate volitional use of limbs, facilitate proximal cocontraction of limbs and core, normalize muscle tone, increase postural control, and increase trunk control . Patient agreeable to OT treatment session, upon arrival patient was found supine in bed.  In comparison to previous session, patient with improvements in EOB sitting tolerance. Patient requiring frequent re direction, verbal cues for safety, verbal cues for correct technique, verbal cues for pacing thru activity steps, cognitive assistance to anticipate next step, and frequent rest periods. Patient continues to be functioning below baseline level, occupational performance remains limited secondary to factors listed above and increased risk for falls and injury.   From OT standpoint, recommendation at time  of d/c would be inpt rehab, when medically stable.  Patient to benefit from continued Occupational Therapy treatment while in the hospital to address deficits as defined above and maximize level of functional independence with ADLs and functional mobility.   Plan   Treatment Interventions ADL retraining;Visual perceptual retraining;Functional transfer training;UE strengthening/ROM;Endurance training;Cognitive reorientation;Patient/family training;Equipment evaluation/education;Neuromuscular reeducation;Fine motor coordination activities;Compensatory technique education;Continued evaluation;Energy conservation;Activityengagement   Goal Expiration Date 04/02/24   OT Treatment Day 15   OT Frequency 3-5x/wk   Discharge Recommendation   Rehab Resource Intensity Level, OT II (Moderate Resource Intensity)   Additional Comments  The patient's raw score on the AM-PAC Daily Activity Inpatient Short Form is 6. A raw score of less than 19 suggests the patient may benefit from discharge to post-acute rehabilitation services. Please refer to the recommendation of the Occupational Therapist for safe discharge planning.   Additional Comments 2 Pt seen as a co-session due to the patient's co-morbidities, clinically unstable presentation, and present impairments which are a regression from the patient's baseline.   AM-PAC Daily Activity Inpatient   Lower Body Dressing 1   Bathing 1   Toileting 1   Upper Body Dressing 1   Grooming 1   Eating 1   Daily Activity Raw Score 6   Turning Head Towards Sound 2   Follow Simple Instructions 2   Low Function Daily Activity Raw Score 10   Low Function Daily Activity Standardized Score  17.31   AM-PAC Applied Cognition Inpatient   Following a Speech/Presentation 1   Understanding Ordinary Conversation 2   Taking Medications 1   Remembering Where Things Are Placed or Put Away 1   Remembering List of 4-5 Errands 1   Taking Care of Complicated Tasks 1   Applied Cognition Raw Score 7   Applied  Cognition Standardized Score 15.17   End of Consult   Education Provided Yes;Family or social support of family present for education by provider   Patient Position at End of Consult Supine;All needs within reach   Nurse Communication Nurse aware of consult     Felisha Wilkerson MS, OTR/L

## 2024-03-27 NOTE — PHYSICAL THERAPY NOTE
Physical Therapy Treatment Note    Patient's Name: Carla Hunt  : 24 1104   PT Last Visit   PT Visit Date 24   Note Type   Note Type Treatment   Pain Assessment   Pain Assessment Tool 0-10   Pain Score No Pain   Restrictions/Precautions   Weight Bearing Precautions Per Order No   Braces or Orthoses   (B resting hand splints and Prevalon boots)   Other Precautions Airborne/isolation;Contact/isolation;Cognitive;Chair Alarm;Bed Alarm;Aspiration;Multiple lines;Telemetry;O2;Fall Risk  (trach / trach collar, wound vac, NGT, COVID-19)   General   Chart Reviewed Yes   Response to Previous Treatment Patient with no complaints from previous session.   Family/Caregiver Present Yes  (spouse)   Cognition   Comments lethargic, inconsistently can shake head no / nod yes   Bed Mobility   Supine to Sit 2  Maximal assistance   Additional items Assist x 2;Assist x 3;Increased time required;Verbal cues;LE management   Sit to Supine 2  Maximal assistance   Additional items Assist x 2;Assist x 3;Increased time required;Verbal cues;LE management   Additional Comments Pt greeted in supine.   Transfers   Sit to Stand 1  Dependent   Additional items Assist x 3;Increased time required;Verbal cues   Stand to Sit 1  Dependent   Additional items Assist x 3;Increased time required;Verbal cues   Additional Comments B HHA and knee block; use of bedsheet as sling to assist; assist of 3rd for trunk extension   Ambulation/Elevation   Gait pattern Not appropriate;Not tested   Balance   Static Sitting Poor   Dynamic Sitting Poor -   Static Standing Zero  (B HHA / B knee block - total assist)   Endurance Deficit   Endurance Deficit Yes   Endurance Deficit Description intermittent Vtach/tachycardic 130s-140s w/ mobility, lethargy, global weakness   Activity Tolerance   Activity Tolerance Patient limited by fatigue;Treatment limited secondary to medical complications (Comment)   Medical Staff Made Aware CHARITY Theodore  restorative [place]   Nurse Made Aware yes - cleared + updated   Exercises   Knee AROM Long Arc Quad Sitting;5 reps;AROM;Bilateral   Heel Cord Stretch Sitting;PROM;Bilateral  (gastroc stretch)   Neuro re-ed sitting EOB x12 min ModA   Assessment   Prognosis Guarded   Problem List Decreased strength;Decreased range of motion;Decreased endurance;Impaired balance;Decreased mobility;Decreased cognition;Decreased coordination;Impaired judgement;Decreased safety awareness;Decreased skin integrity;Pain   Assessment Pt seen for PT treatment session w/ interventions consisting of bed mobility training, sitting balance instruction, LE ther ex instruction, + t/f training. Pt lethargic initially, more alert throughout session w/ mobility. Pt required encouragement to participate in ther ex - limited to 5 LAQ ea LE throughout 1/2 available ROM. Pt dependent x3 w/ t/f this session. Deferred further t/f attempts at pt tachy to 130's-140's. From a PT standpoint continue to recommend rehab upon d/c.   Goals   Patient Goals none expressed   Plan   Treatment/Interventions Functional transfer training;LE strengthening/ROM;Therapeutic exercise;Endurance training;Patient/family training;Equipment eval/education;Bed mobility;Compensatory technique education;Spoke to nursing;OT;Family   Progress Slow progress, decreased activity tolerance   PT Frequency 2-3x/wk   Discharge Recommendation   Rehab Resource Intensity Level, PT I (Maximum Resource Intensity)   AM-PAC Basic Mobility Inpatient   Turning in Flat Bed Without Bedrails 1   Lying on Back to Sitting on Edge of Flat Bed Without Bedrails 1   Moving Bed to Chair 1   Standing Up From Chair Using Arms 1   Walk in Room 1   Climb 3-5 Stairs With Railing 1   Basic Mobility Inpatient Raw Score 6   [facility] Highest Level Of Mobility   JH-HLM Goal 2: Bed activities/Dependent transfer   JH-HLM Achieved 3: Sit at edge of bed   Education   Education Provided Mobility training;Home exercise program    Patient Reinforcement needed   End of Consult   Patient Position at End of Consult All needs within reach  (Kreg bed in chair position, all lines in tact, BUE elevated on pillows, B Prevalon boots donned, spouse at bedside)       Laura Kern, PT, DPT

## 2024-03-27 NOTE — PLAN OF CARE
Problem: OCCUPATIONAL THERAPY ADULT  Goal: Performs self-care activities at highest level of function for planned discharge setting.  See evaluation for individualized goals.  Description: Treatment Interventions: ADL retraining, Functional transfer training, UE strengthening/ROM, Endurance training, Patient/family training, Equipment evaluation/education, Compensatory technique education, Continued evaluation, Energy conservation, Activityengagement          See flowsheet documentation for full assessment, interventions and recommendations.   Outcome: Progressing  Note: Limitation: Decreased ADL status, Decreased UE ROM, Decreased UE strength, Decreased Safe judgement during ADL, Decreased cognition, Decreased endurance, Decreased self-care trans, Decreased high-level ADLs, Non-func R UE, Non-func L UE  Prognosis: Fair, Poor  Assessment: Patient participated in Skilled OT session 3/27/2024 with interventions consisting of ADL re training with the use of correct body mechnaics, Energy Conservation techniques, deep breathing technique, safety awareness and fall prevention techniques, therapeutic exercise to: increase functional use of BUEs, increase BUE muscle strength ,  therapeutic activities to: increase activity tolerance, neuromuscular re education to: facilitate volitional use of limbs, facilitate proximal cocontraction of limbs and core, normalize muscle tone, increase postural control, and increase trunk control . Patient agreeable to OT treatment session, upon arrival patient was found supine in bed.  In comparison to previous session, patient with improvements in EOB sitting tolerance. Patient requiring frequent re direction, verbal cues for safety, verbal cues for correct technique, verbal cues for pacing thru activity steps, cognitive assistance to anticipate next step, and frequent rest periods. Patient continues to be functioning below baseline level, occupational performance remains limited secondary  to factors listed above and increased risk for falls and injury.   From OT standpoint, recommendation at time of d/c would be inpt rehab, when medically stable.  Patient to benefit from continued Occupational Therapy treatment while in the hospital to address deficits as defined above and maximize level of functional independence with ADLs and functional mobility.     Rehab Resource Intensity Level, OT: II (Moderate Resource Intensity)   Felisha Wilkerson MS, OTR/L

## 2024-03-28 LAB
ANION GAP SERPL CALCULATED.3IONS-SCNC: 12 MMOL/L (ref 4–13)
ANISOCYTOSIS BLD QL SMEAR: PRESENT
BUN SERPL-MCNC: 62 MG/DL (ref 5–25)
CALCIUM SERPL-MCNC: 9.7 MG/DL (ref 8.4–10.2)
CHLORIDE SERPL-SCNC: 114 MMOL/L (ref 96–108)
CO2 SERPL-SCNC: 19 MMOL/L (ref 21–32)
CREAT SERPL-MCNC: 0.83 MG/DL (ref 0.6–1.3)
ERYTHROCYTE [DISTWIDTH] IN BLOOD BY AUTOMATED COUNT: 17.1 % (ref 11.6–15.1)
GFR SERPL CREATININE-BSD FRML MDRD: 77 ML/MIN/1.73SQ M
GLUCOSE SERPL-MCNC: 184 MG/DL (ref 65–140)
GLUCOSE SERPL-MCNC: 195 MG/DL (ref 65–140)
GLUCOSE SERPL-MCNC: 199 MG/DL (ref 65–140)
GLUCOSE SERPL-MCNC: 201 MG/DL (ref 65–140)
GLUCOSE SERPL-MCNC: 216 MG/DL (ref 65–140)
GLUCOSE SERPL-MCNC: 293 MG/DL (ref 65–140)
GLUCOSE SERPL-MCNC: 293 MG/DL (ref 65–140)
GLUCOSE SERPL-MCNC: 296 MG/DL (ref 65–140)
GLUCOSE SERPL-MCNC: 311 MG/DL (ref 65–140)
GLUCOSE SERPL-MCNC: 338 MG/DL (ref 65–140)
HCT VFR BLD AUTO: 24.6 % (ref 34.8–46.1)
HGB BLD-MCNC: 7.8 G/DL (ref 11.5–15.4)
LYMPHOCYTES # BLD AUTO: 0.05 THOUSAND/UL (ref 0.6–4.47)
LYMPHOCYTES # BLD AUTO: 5 %
MACROCYTES BLD QL AUTO: PRESENT
MAGNESIUM SERPL-MCNC: 2 MG/DL (ref 1.9–2.7)
MCH RBC QN AUTO: 31.2 PG (ref 26.8–34.3)
MCHC RBC AUTO-ENTMCNC: 31.7 G/DL (ref 31.4–37.4)
MCV RBC AUTO: 98 FL (ref 82–98)
METAMYELOCYTES # BLD MANUAL: 0.02 THOUSAND/UL (ref 0–0.1)
METAMYELOCYTES NFR BLD MANUAL: 2 % (ref 0–1)
MONOCYTES # BLD AUTO: 0.11 THOUSAND/UL (ref 0–1.22)
MONOCYTES NFR BLD AUTO: 10 % (ref 4–12)
MYELOCYTES # BLD MANUAL: 0.01 THOUSAND/UL (ref 0–0.1)
MYELOCYTES NFR BLD MANUAL: 1 % (ref 0–1)
NEUTS BAND NFR BLD MANUAL: 21 % (ref 0–8)
NEUTS SEG # BLD: 0.88 THOUSAND/UL (ref 1.81–6.82)
NEUTS SEG NFR BLD AUTO: 61 %
NRBC BLD AUTO-RTO: 2 /100 WBCS
PHOSPHATE SERPL-MCNC: 3.9 MG/DL (ref 2.7–4.5)
PLATELET # BLD AUTO: 34 THOUSANDS/UL (ref 149–390)
PLATELET BLD QL SMEAR: ABNORMAL
PMV BLD AUTO: 12.4 FL (ref 8.9–12.7)
POIKILOCYTOSIS BLD QL SMEAR: PRESENT
POTASSIUM SERPL-SCNC: 3.1 MMOL/L (ref 3.5–5.3)
RBC # BLD AUTO: 2.5 MILLION/UL (ref 3.81–5.12)
RBC MORPH BLD: PRESENT
SODIUM SERPL-SCNC: 145 MMOL/L (ref 135–147)
TOTAL CELLS COUNTED SPEC: 100
WBC # BLD AUTO: 1.07 THOUSAND/UL (ref 4.31–10.16)

## 2024-03-28 PROCEDURE — 83735 ASSAY OF MAGNESIUM: CPT | Performed by: STUDENT IN AN ORGANIZED HEALTH CARE EDUCATION/TRAINING PROGRAM

## 2024-03-28 PROCEDURE — 94640 AIRWAY INHALATION TREATMENT: CPT

## 2024-03-28 PROCEDURE — C9113 INJ PANTOPRAZOLE SODIUM, VIA: HCPCS | Performed by: INTERNAL MEDICINE

## 2024-03-28 PROCEDURE — 94664 DEMO&/EVAL PT USE INHALER: CPT

## 2024-03-28 PROCEDURE — NC001 PR NO CHARGE: Performed by: STUDENT IN AN ORGANIZED HEALTH CARE EDUCATION/TRAINING PROGRAM

## 2024-03-28 PROCEDURE — 80048 BASIC METABOLIC PNL TOTAL CA: CPT

## 2024-03-28 PROCEDURE — 84100 ASSAY OF PHOSPHORUS: CPT

## 2024-03-28 PROCEDURE — 99233 SBSQ HOSP IP/OBS HIGH 50: CPT | Performed by: STUDENT IN AN ORGANIZED HEALTH CARE EDUCATION/TRAINING PROGRAM

## 2024-03-28 PROCEDURE — 85027 COMPLETE CBC AUTOMATED: CPT

## 2024-03-28 PROCEDURE — 85007 BL SMEAR W/DIFF WBC COUNT: CPT

## 2024-03-28 PROCEDURE — 99232 SBSQ HOSP IP/OBS MODERATE 35: CPT | Performed by: INTERNAL MEDICINE

## 2024-03-28 PROCEDURE — 94760 N-INVAS EAR/PLS OXIMETRY 1: CPT

## 2024-03-28 PROCEDURE — 82948 REAGENT STRIP/BLOOD GLUCOSE: CPT

## 2024-03-28 RX ORDER — POTASSIUM CHLORIDE 14.9 MG/ML
20 INJECTION INTRAVENOUS
Status: COMPLETED | OUTPATIENT
Start: 2024-03-28 | End: 2024-03-28

## 2024-03-28 RX ORDER — INSULIN LISPRO 100 [IU]/ML
1-6 INJECTION, SOLUTION INTRAVENOUS; SUBCUTANEOUS
Status: DISCONTINUED | OUTPATIENT
Start: 2024-03-28 | End: 2024-03-31

## 2024-03-28 RX ORDER — INSULIN LISPRO 100 [IU]/ML
1-6 INJECTION, SOLUTION INTRAVENOUS; SUBCUTANEOUS EVERY 6 HOURS
Status: DISCONTINUED | OUTPATIENT
Start: 2024-03-28 | End: 2024-03-28

## 2024-03-28 RX ORDER — POTASSIUM CHLORIDE 14.9 MG/ML
20 INJECTION INTRAVENOUS
Qty: 200 ML | Refills: 0 | Status: DISCONTINUED | OUTPATIENT
Start: 2024-03-28 | End: 2024-03-28

## 2024-03-28 RX ORDER — INSULIN GLARGINE 100 [IU]/ML
10 INJECTION, SOLUTION SUBCUTANEOUS EVERY MORNING
Status: DISCONTINUED | OUTPATIENT
Start: 2024-03-28 | End: 2024-03-31

## 2024-03-28 RX ADMIN — CHLORHEXIDINE GLUCONATE 0.12% ORAL RINSE 15 ML: 1.2 LIQUID ORAL at 08:18

## 2024-03-28 RX ADMIN — INSULIN LISPRO 2 UNITS: 100 INJECTION, SOLUTION INTRAVENOUS; SUBCUTANEOUS at 12:27

## 2024-03-28 RX ADMIN — FILGRASTIM-AAFI 300 MCG: 300 INJECTION, SOLUTION SUBCUTANEOUS at 08:19

## 2024-03-28 RX ADMIN — DEXTROSE 75 ML/HR: 5 SOLUTION INTRAVENOUS at 22:12

## 2024-03-28 RX ADMIN — SODIUM CHLORIDE SOLN NEBU 3% 4 ML: 3 NEBU SOLN at 12:51

## 2024-03-28 RX ADMIN — VENLAFAXINE 12.5 MG: 25 TABLET ORAL at 15:02

## 2024-03-28 RX ADMIN — VENLAFAXINE 12.5 MG: 25 TABLET ORAL at 17:51

## 2024-03-28 RX ADMIN — NYSTATIN: 100000 POWDER TOPICAL at 08:53

## 2024-03-28 RX ADMIN — POTASSIUM CHLORIDE 20 MEQ: 14.9 INJECTION, SOLUTION INTRAVENOUS at 08:27

## 2024-03-28 RX ADMIN — TOPIRAMATE 50 MG: 100 TABLET, FILM COATED ORAL at 08:19

## 2024-03-28 RX ADMIN — POLYETHYLENE GLYCOL 3350 17 G: 17 POWDER, FOR SOLUTION ORAL at 08:19

## 2024-03-28 RX ADMIN — Medication 2 SPRAY: at 20:32

## 2024-03-28 RX ADMIN — SODIUM CHLORIDE SOLN NEBU 3% 4 ML: 3 NEBU SOLN at 07:22

## 2024-03-28 RX ADMIN — LEVALBUTEROL HYDROCHLORIDE 1.25 MG: 1.25 SOLUTION RESPIRATORY (INHALATION) at 19:12

## 2024-03-28 RX ADMIN — METHYLPREDNISOLONE SODIUM SUCCINATE 30 MG: 40 INJECTION, POWDER, FOR SOLUTION INTRAMUSCULAR; INTRAVENOUS at 08:18

## 2024-03-28 RX ADMIN — METHYLPREDNISOLONE SODIUM SUCCINATE 30 MG: 40 INJECTION, POWDER, FOR SOLUTION INTRAMUSCULAR; INTRAVENOUS at 20:27

## 2024-03-28 RX ADMIN — TOPIRAMATE 50 MG: 100 TABLET, FILM COATED ORAL at 17:51

## 2024-03-28 RX ADMIN — IPRATROPIUM BROMIDE 0.5 MG: 0.5 SOLUTION RESPIRATORY (INHALATION) at 07:22

## 2024-03-28 RX ADMIN — SODIUM CHLORIDE SOLN NEBU 3% 4 ML: 3 NEBU SOLN at 19:12

## 2024-03-28 RX ADMIN — LEVALBUTEROL HYDROCHLORIDE 1.25 MG: 1.25 SOLUTION RESPIRATORY (INHALATION) at 07:22

## 2024-03-28 RX ADMIN — POTASSIUM CHLORIDE 20 MEQ: 14.9 INJECTION, SOLUTION INTRAVENOUS at 06:39

## 2024-03-28 RX ADMIN — PANTOPRAZOLE SODIUM 40 MG: 40 INJECTION, POWDER, FOR SOLUTION INTRAVENOUS at 08:19

## 2024-03-28 RX ADMIN — MODAFINIL 100 MG: 100 TABLET ORAL at 08:19

## 2024-03-28 RX ADMIN — Medication 2 SPRAY: at 08:19

## 2024-03-28 RX ADMIN — NYSTATIN: 100000 POWDER TOPICAL at 17:51

## 2024-03-28 RX ADMIN — PANTOPRAZOLE SODIUM 40 MG: 40 INJECTION, POWDER, FOR SOLUTION INTRAVENOUS at 20:27

## 2024-03-28 RX ADMIN — INSULIN LISPRO 5 UNITS: 100 INJECTION, SOLUTION INTRAVENOUS; SUBCUTANEOUS at 17:48

## 2024-03-28 RX ADMIN — LEVALBUTEROL HYDROCHLORIDE 1.25 MG: 1.25 SOLUTION RESPIRATORY (INHALATION) at 12:51

## 2024-03-28 RX ADMIN — VENLAFAXINE 12.5 MG: 25 TABLET ORAL at 08:19

## 2024-03-28 RX ADMIN — CHLORHEXIDINE GLUCONATE 0.12% ORAL RINSE 15 ML: 1.2 LIQUID ORAL at 20:32

## 2024-03-28 RX ADMIN — SODIUM CHLORIDE 7 UNITS/HR: 9 INJECTION, SOLUTION INTRAVENOUS at 20:20

## 2024-03-28 RX ADMIN — LACOSAMIDE ORAL SOLUTION 100 MG: 10 SOLUTION ORAL at 08:20

## 2024-03-28 RX ADMIN — SODIUM CHLORIDE 6 UNITS/HR: 9 INJECTION, SOLUTION INTRAVENOUS at 00:25

## 2024-03-28 RX ADMIN — LACOSAMIDE ORAL SOLUTION 100 MG: 10 SOLUTION ORAL at 20:27

## 2024-03-28 RX ADMIN — INSULIN GLARGINE 10 UNITS: 100 INJECTION, SOLUTION SUBCUTANEOUS at 12:27

## 2024-03-28 NOTE — PROGRESS NOTES
Medical Oncology/Hematology Progress Note  Carla Hunt, female, 59 y.o., 1965,  MICU 12/MICU 12, [id number]     HPI    59 y.o. 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      Reference 03/25/24 03/26/24 03/27/24 03/28/24    WBC 4.31 - 10.16 k/uL 1.20 (L) 0.90 (L) 0.91 (L) 1.07 (L)   Hemoglobin 11.5 - 15.4 g/dL 8.8 (L) 8.3 (L) 7.7 (L) 7.8 (L)   Platelet Count 149 - 390 k/uL 38 (L) 27 (L) 43 (L) 34 (L)   Absolute Neutrophils 1.81 - 6.82 k/uL  0.82 (L) 0.86 (L) 0.88 (L)   Absolute Lymphocytes 0.60 - 4.47 k/uL 0.06 (L)   0.05 (L)       Assessment and Plan  1. Teto marginal zone lymphoma, stage IV, with 14q+ and 18q+   2. Acute Pancytopenia    Patient's Pancytopenia is most likely multifactorial, including patient's history of marginal zone lymphoma with bone marrow involvement, history of Bendamustine (alkylating agent) more than the Rituximab, also patient with recent complex multiple inciting events and illness as listed above including the surgery, hemorrhagic shock and multiple lines of antibiotics including cephalosporins. Patient was also on  Lamotrigine for seizure disorder, which was reported to possible cause severe cases of neutropenia/ agranulocytosis.     Biopsy was recommended and IR consultation was placed however could not be performed because patient could not lay in prone position/intubated.     Plan above, Dr. Coronado started Granix 3/27, daily with daily CBC differential to help with the neutropenia.     As was discussed with critical care team and neurology, Lamictal was switched to Vimpat, in case if it was contributing/causing the neutropenia.  Also noted that the patient has completed antibiotics and last dose of minocycline was given 3/27.     Transfuse leukoreduced irradiated RBCs if hemoglobin is less than 7.   Transfuse leukoreduced irradiated platelets if less than 20 K or at higher threshold less than 50 if evidence of bleeding.  Neutropenic precautions , monitor vitals , Daily CBC with differential and reevaluate.         Outpatient follow up plan: Has follow-up appointment with [facility] hematology oncology 4/20/2024   Communication with patient/family:  I did update the patient, as well as her  and daughter at bedside  Communication with team:  Did communicate with Dr. Lazcano, primary team.       I did review this patient with Dr. Coronado and they are in agreement with this plan.          Subjective:    Review of Systems  Unable to perform as patient was intubated and was sleeping at the time of exam but according to her daughter at bedside patient was awake earlier and she was communicating with her appropriately and was alert and oriented as per her daughter.  No reported bleeding issue.      Objective:     Medication Administration - last 24 hours from 03/27/2024 1637 to 03/28/2024 1637         Date/Time Order Dose Route Action Action by     03/28/2024 0853 EDT nystatin (MYCOSTATIN) powder -- Topical Given Kaya Last RN     03/27/2024 1857 EDT nystatin (MYCOSTATIN) powder -- Topical Given Kaya Last RN      03/28/2024 0818 EDT chlorhexidine (PERIDEX) 0.12 % oral rinse 15 mL 15 mL Mouth/Throat Given Kaya Last RN     03/27/2024 2104 EDT chlorhexidine (PERIDEX) 0.12 % oral rinse 15 mL 15 mL Mouth/Throat Given Elana Tapia, MARSHA     03/28/2024 1251 EDT levalbuterol (XOPENEX) inhalation solution 1.25 mg 1.25 mg Nebulization Given Mariesa Current, RT     03/28/2024 0722 EDT levalbuterol (XOPENEX) inhalation solution 1.25 mg 1.25 mg Nebulization Given Mariesa Current, RT     03/27/2024 2026 EDT levalbuterol (XOPENEX) inhalation solution 1.25 mg 1.25 mg Nebulization Given Debo Haji, RT     03/28/2024 0722 EDT ipratropium (ATROVENT) 0.02 % inhalation solution 0.5 mg 0.5 mg Nebulization Given Mariesa Current, RT     03/27/2024 2026 EDT ipratropium (ATROVENT) 0.02 % inhalation solution 0.5 mg 0.5 mg Nebulization Given Debo Haji, RT     03/28/2024 0819 EDT polyethylene glycol (MIRALAX) packet 17 g 17 g Per NG Tube Given Kaya Last RN     03/28/2024 0819 EDT topiramate (TOPAMAX) 6 mg/ml oral suspension 50 mg 50 mg Per PEG Tube Given Kaya Last RN     03/27/2024 1903 EDT topiramate (TOPAMAX) 6 mg/ml oral suspension 50 mg 50 mg Per PEG Tube Given Kaya Last RN     03/28/2024 1251 EDT sodium chloride 3 % inhalation solution 4 mL 4 mL Nebulization Given Mariesa Current, RT     03/28/2024 0722 EDT sodium chloride 3 % inhalation solution 4 mL 4 mL Nebulization Given Mariesa Current, RT     03/27/2024 2026 EDT sodium chloride 3 % inhalation solution 4 mL 4 mL Nebulization Given Debo Haji, RT     03/28/2024 1502 EDT venlafaxine (EFFEXOR) tablet 12.5 mg 12.5 mg Oral Given Kaya Last RN     03/28/2024 0819 EDT venlafaxine (EFFEXOR) tablet 12.5 mg 12.5 mg Oral Given Kaya Last RN     03/27/2024 1849 EDT venlafaxine (EFFEXOR) tablet 12.5 mg 12.5 mg Oral Given Kaya Last RN     03/28/2024 0819 EDT pantoprazole (PROTONIX) injection 40 mg 40 mg Intravenous Given Kaya Last, RN      03/27/2024 2104 EDT pantoprazole (PROTONIX) injection 40 mg 40 mg Intravenous Given Elana Tapia RN     03/28/2024 0818 EDT methylPREDNISolone sodium succinate (Solu-MEDROL) injection 30 mg 30 mg Intravenous Given Kaya Last RN     03/27/2024 2104 EDT methylPREDNISolone sodium succinate (Solu-MEDROL) injection 30 mg 30 mg Intravenous Given Elana Tapia RN     03/28/2024 0044 EDT insulin lispro (HumALOG/ADMELOG) 100 units/mL subcutaneous injection 1-5 Units -- Subcutaneous Not Given Elana Tapia RN     03/27/2024 1849 EDT insulin lispro (HumALOG/ADMELOG) 100 units/mL subcutaneous injection 1-5 Units 3 Units Subcutaneous Given Kaya Last RN     03/28/2024 0819 EDT sodium chloride (OCEAN) 0.65 % nasal spray 2 spray 2 spray Each Nare Given Kaya Last RN     03/27/2024 2114 EDT sodium chloride (OCEAN) 0.65 % nasal spray 2 spray 2 spray Each Nare Given Elana Tapia RN     03/28/2024 0819 EDT modafinil (PROVIGIL) tablet 100 mg 100 mg Per NG Tube Given Kaya Last RN     03/28/2024 0817 EDT dextrose 5 % infusion 75 mL/hr Intravenous Rate/Dose Change Kaya Last RN     03/28/2024 0819 EDT Filgrastim-aafi (NIVESTYM) subcutaneous injection 300 mcg 300 mcg Subcutaneous Given Kaya Last RN     03/27/2024 1903 EDT Filgrastim-aafi (NIVESTYM) subcutaneous injection 300 mcg 300 mcg Subcutaneous Given Kaya Last RN     03/27/2024 1849 EDT lacosamide (VIMPAT) injection 300 mg 300 mg Intravenous Given Kaya Last RN     03/28/2024 0820 EDT lacosamide (VIMPAT) oral solution 100 mg 100 mg Oral Given Kaya Last RN     03/27/2024 2104 EDT lacosamide (VIMPAT) oral solution 100 mg 100 mg Oral Given Elana Tapia RN     03/28/2024 1200 EDT insulin regular (HumuLIN R,NovoLIN R) 1 Units/mL in sodium chloride 0.9 % 100 mL infusion 0 Units/hr Intravenous Stopped Kaya Last RN     03/28/2024 1040 EDT insulin regular (HumuLIN R,NovoLIN R) 1 Units/mL in sodium  "chloride 0.9 % 100 mL infusion 6 Units/hr Intravenous Rate/Dose Change Kaya Last RN     03/28/2024 0628 EDT insulin regular (HumuLIN R,NovoLIN R) 1 Units/mL in sodium chloride 0.9 % 100 mL infusion 4 Units/hr Intravenous Rate/Dose Change Elana Tapia RN     03/28/2024 0405 EDT insulin regular (HumuLIN R,NovoLIN R) 1 Units/mL in sodium chloride 0.9 % 100 mL infusion 7 Units/hr Intravenous Rate/Dose Change Elana Tapia RN     03/28/2024 0204 EDT insulin regular (HumuLIN R,NovoLIN R) 1 Units/mL in sodium chloride 0.9 % 100 mL infusion 5 Units/hr Intravenous Rate/Dose Change Elana Tapia RN     03/28/2024 0025 EDT insulin regular (HumuLIN R,NovoLIN R) 1 Units/mL in sodium chloride 0.9 % 100 mL infusion 6 Units/hr Intravenous New Bag Elana Tapia RN     03/28/2024 1030 EDT potassium chloride 20 mEq IVPB (premix) 0 mEq Intravenous Stopped Kaya Last RN     03/28/2024 0827 EDT potassium chloride 20 mEq IVPB (premix) 20 mEq Intravenous New Bag Kaya Last RN     03/28/2024 0639 EDT potassium chloride 20 mEq IVPB (premix) 20 mEq Intravenous New Bag Elana Tapia RN     03/28/2024 1227 EDT insulin lispro (HumALOG/ADMELOG) 100 units/mL subcutaneous injection 1-6 Units 2 Units Subcutaneous Given Kaya Last RN     03/28/2024 1227 EDT insulin glargine (LANTUS) subcutaneous injection 10 Units 0.1 mL 10 Units Subcutaneous Given Kaya Last RN            /78 (BP Location: Right arm)   Pulse (!) 126   Temp 98.8 °F (37.1 °C) (Axillary)   Resp (!) 24   Ht 5' 8\" (1.727 m)   Wt 70.2 kg (154 lb 12.2 oz)   SpO2 97%   BMI 23.53 kg/m²       Physical Exam  - GEN: Appears ill, pale, intubated, she was sleeping at time of the exam, see subjective above  - HEENT: Anicteric, mucous membranes dry, PERRL and EOMI   - NECK: No lymphadenopathy, JVD or carotid bruits   - HEART: Regular rhythm, mild tachycardia,, normal S1 and S2, no murmurs, clicks, gallops or rubs   - LUNGS: " Clear to auscultation bilaterally; no wheezes, rales, or rhonchi  - ABDOMEN: no organomegaly, no distention or masses felt on exam.   - EXTREMITIES: Peripheral pulses normal; no clubbing, cyanosis; cold extremities, no edema  - NEURO: No focal findings, CN II-XII are grossly intact.   - Musculoskeletal:  normal ROM, no swollen or erythematous joints.   - SKIN: pale, but otherwise Normal without suspicious lesions on exposed skin    Recent Results (from the past 48 hour(s))   Basic metabolic panel    Collection Time: 03/26/24  8:46 PM   Result Value Ref Range    Sodium 144 135 - 147 mmol/L    Potassium 3.8 3.5 - 5.3 mmol/L    Chloride 115 (H) 96 - 108 mmol/L    CO2 20 (L) 21 - 32 mmol/L    ANION GAP 9 4 - 13 mmol/L    BUN 54 (H) 5 - 25 mg/dL    Creatinine 0.81 0.60 - 1.30 mg/dL    Glucose 179 (H) 65 - 140 mg/dL    Calcium 8.9 8.4 - 10.2 mg/dL    eGFR 80 ml/min/1.73sq m   Fingerstick Glucose (POCT)    Collection Time: 03/26/24  9:00 PM   Result Value Ref Range    POC Glucose 186 (H) 65 - 140 mg/dl   Magnesium    Collection Time: 03/27/24  5:45 AM   Result Value Ref Range    Magnesium 2.0 1.9 - 2.7 mg/dL   Phosphorus    Collection Time: 03/27/24  5:45 AM   Result Value Ref Range    Phosphorus 4.6 (H) 2.7 - 4.5 mg/dL   CBC and differential    Collection Time: 03/27/24  5:45 AM   Result Value Ref Range    WBC 0.91 (L) 4.31 - 10.16 Thousand/uL    RBC 2.51 (L) 3.81 - 5.12 Million/uL    Hemoglobin 7.7 (L) 11.5 - 15.4 g/dL    Hematocrit 24.8 (L) 34.8 - 46.1 %    MCV 99 (H) 82 - 98 fL    MCH 30.7 26.8 - 34.3 pg    MCHC 31.0 (L) 31.4 - 37.4 g/dL    RDW 17.1 (H) 11.6 - 15.1 %    MPV 12.4 8.9 - 12.7 fL    Platelets 43 (L) 149 - 390 Thousands/uL    nRBC 0 /100 WBCs   Basic metabolic panel    Collection Time: 03/27/24  5:45 AM   Result Value Ref Range    Sodium 146 135 - 147 mmol/L    Potassium 4.0 3.5 - 5.3 mmol/L    Chloride 114 (H) 96 - 108 mmol/L    CO2 20 (L) 21 - 32 mmol/L    ANION GAP 12 4 - 13 mmol/L    BUN 56 (H) 5 - 25  mg/dL    Creatinine 0.83 0.60 - 1.30 mg/dL    Glucose 263 (H) 65 - 140 mg/dL    Calcium 9.3 8.4 - 10.2 mg/dL    eGFR 77 ml/min/1.73sq m   Manual Differential(PHLEBS Do Not Order)    Collection Time: 03/27/24  5:45 AM   Result Value Ref Range    Segmented % 49 43 - 75 %    Bands % 46 (H) 0 - 8 %    Lymphocytes % 2 (L) 14 - 44 %    Monocytes % 0 (L) 4 - 12 %    Eosinophils % 0 0 - 6 %    Basophils % 0 0 - 1 %    Metamyelocytes % 3 (H) 0 - 1 %    Absolute Neutrophils 0.86 (L) 1.85 - 7.62 Thousand/uL    Absolute Lymphocytes 0.02 (L) 0.60 - 4.47 Thousand/uL    Absolute Monocytes 0.00 0.00 - 1.22 Thousand/uL    Absolute Eosinophils 0.00 0.00 - 0.40 Thousand/uL    Absolute Basophils 0.00 0.00 - 0.10 Thousand/uL    Total Counted      nRBC 5 (H) 0 - 2 /100 WBC    RBC Morphology Present     Platelet Estimate Decreased (A) Adequate    Anisocytosis Present     Macrocytes Present     Ovalocytes Present    Prepare Leukoreduced Platelet Pheresis (1 pheresis product = 6-8 pooled units): 1 Product, Irradiated    Collection Time: 03/27/24  5:55 AM   Result Value Ref Range    Unit Product Code T1886T24     Unit Number [id number]     Unit ABO A     Unit RH POS     Unit Dispense Status Presumed Trans     Unit Product Volume 300 mL   POCT COVID 19 Rapid Antigen    Collection Time: 03/27/24 11:29 AM   Result Value Ref Range    POCT COVID19 Antigen Negative Negative    Control Valid    POCT COVID 19 Rapid Antigen in 48 hours    Collection Time: 03/27/24 11:29 AM   Result Value Ref Range    POCT COVID19 Antigen Negative Negative    Control Valid    Fingerstick Glucose (POCT)    Collection Time: 03/27/24 11:45 AM   Result Value Ref Range    POC Glucose 244 (H) 65 - 140 mg/dl   Fingerstick Glucose (POCT)    Collection Time: 03/27/24  6:15 PM   Result Value Ref Range    POC Glucose 273 (H) 65 - 140 mg/dl   Fingerstick Glucose (POCT)    Collection Time: 03/27/24 11:23 PM   Result Value Ref Range    POC Glucose 321 (H) 65 - 140 mg/dl    Fingerstick Glucose (POCT)    Collection Time: 03/28/24  2:03 AM   Result Value Ref Range    POC Glucose 293 (H) 65 - 140 mg/dl   Fingerstick Glucose (POCT)    Collection Time: 03/28/24  4:04 AM   Result Value Ref Range    POC Glucose 293 (H) 65 - 140 mg/dl   Magnesium    Collection Time: 03/28/24  5:32 AM   Result Value Ref Range    Magnesium 2.0 1.9 - 2.7 mg/dL   Phosphorus    Collection Time: 03/28/24  5:32 AM   Result Value Ref Range    Phosphorus 3.9 2.7 - 4.5 mg/dL   CBC and differential    Collection Time: 03/28/24  5:32 AM   Result Value Ref Range    WBC 1.07 (L) 4.31 - 10.16 Thousand/uL    RBC 2.50 (L) 3.81 - 5.12 Million/uL    Hemoglobin 7.8 (L) 11.5 - 15.4 g/dL    Hematocrit 24.6 (L) 34.8 - 46.1 %    MCV 98 82 - 98 fL    MCH 31.2 26.8 - 34.3 pg    MCHC 31.7 31.4 - 37.4 g/dL    RDW 17.1 (H) 11.6 - 15.1 %    MPV 12.4 8.9 - 12.7 fL    Platelets 34 (L) 149 - 390 Thousands/uL    nRBC 2 /100 WBCs   Basic metabolic panel    Collection Time: 03/28/24  5:32 AM   Result Value Ref Range    Sodium 145 135 - 147 mmol/L    Potassium 3.1 (L) 3.5 - 5.3 mmol/L    Chloride 114 (H) 96 - 108 mmol/L    CO2 19 (L) 21 - 32 mmol/L    ANION GAP 12 4 - 13 mmol/L    BUN 62 (H) 5 - 25 mg/dL    Creatinine 0.83 0.60 - 1.30 mg/dL    Glucose 201 (H) 65 - 140 mg/dL    Calcium 9.7 8.4 - 10.2 mg/dL    eGFR 77 ml/min/1.73sq m   Peripheral Smear    Collection Time: 03/28/24  5:32 AM   Result Value Ref Range    Total Counted 100     Segmented % 61 %    Bands % 21 (H) 0 - 8 %    Lymphocytes % 5 %    Monocytes % 10 4 - 12 %    Metamyelocytes % 2 (H) 0 - 1 %    Myelocytes % 1 0 - 1 %    Absolute Neutrophils 0.88 (L) 1.81 - 6.82 Thousand/uL    Absolute Lymphocytes 0.05 (L) 0.60 - 4.47 Thousand/uL    Absolute Monocytes 0.11 0.00 - 1.22 Thousand/uL    Absolute Metamyelocytes 0.02 0.00 - 0.10 Thousand/uL    Absolute Myelocytes 0.01 0.00 - 0.10 Thousand/uL    RBC Morphology Present     Platelet Estimate Decreased (A) Adequate    Anisocytosis  Present     Macrocytes Present     Poikilocytes Present    Fingerstick Glucose (POCT)    Collection Time: 03/28/24  6:26 AM   Result Value Ref Range    POC Glucose 199 (H) 65 - 140 mg/dl   Fingerstick Glucose (POCT)    Collection Time: 03/28/24  8:07 AM   Result Value Ref Range    POC Glucose 184 (H) 65 - 140 mg/dl   Fingerstick Glucose (POCT)    Collection Time: 03/28/24 10:01 AM   Result Value Ref Range    POC Glucose 195 (H) 65 - 140 mg/dl   Fingerstick Glucose (POCT)    Collection Time: 03/28/24 12:03 PM   Result Value Ref Range    POC Glucose 216 (H) 65 - 140 mg/dl       FL barium swallow video w speech    Result Date: 3/27/2024  Narrative: A video barium swallow study was performed by the Department of Speech Pathology. Please refer to the report for the official interpretation. The images are stored for archival purposes only. Study images were not formally reviewed by the Radiology Department.    VAS upper limb venous duplex scan, unilateral/limited    Result Date: 3/24/2024  Narrative:  [facility] REPORT CLINICAL: Indications: Patient presents with left upper extremity edema x few days. Risk Factors The patient has history of CKD.  FINDINGS:  Left                       Thrombus           Cephalic Upper Arm Distal  Acute - Occlusive  Ceph AC                    Acute - Occlusive     CONCLUSION:  Impression RIGHT UPPER LIMB LIMITED: Unable to evaluate the neck veins due to medical devises.  LEFT UPPER LIMB: No evidence of acute or chronic deep vein thrombosis. Unable to evaluate IJV and innominate vein due medical devises. There is evidence of acute occlusive superficial thrombophlebitis noted in the cephalic vein at the elbow and distal upper arm. Doppler evaluation shows a normal response to augmentation maneuvers.  Technical findings were given to Dr. Sindi Recinos.  SIGNATURE: Electronically Signed by: ABIMAEL GONZALES DO on 2024-03-24 09:56:59 PM    XR chest portable    Result Date:  3/22/2024  Narrative: XR CHEST PORTABLE INDICATION: hypoxia. COMPARISON: Compared with 3/20/2024 FINDINGS: Tracheostomy tube. Feeding tube in the stomach. Mild prominent interstitial markings. No pneumothorax or pleural effusion. Normal cardiomediastinal silhouette. Bones are unremarkable for age. Normal upper abdomen.     Impression: Mild congestive changes. Workstation performed: [id number]     XR chest portable ICU    Result Date: 3/20/2024  Narrative: XR CHEST PORTABLE ICU INDICATION: f/u pneumonia. COMPARISON: Radiograph March 14, 2024 FINDINGS: Tracheostomy tube in place. Enteric tube coursing into the stomach. Multifocal patchy airspace opacities in a bronchiolar distribution, particularly in the lower lobes, overall slightly improved. No pneumothorax or pleural effusion. Normal cardiomediastinal silhouette. Bones are unremarkable for age. Normal upper abdomen.     Impression: Overall mild improvement in multifocal bronchopneumonia, likely aspiration related. Workstation performed: [id number]     Bronchoscopy    Result Date: 3/19/2024  Narrative: Table formatting from the original result was not included. [facility] [address] [facility] [id number] [phone] DATE OF SERVICE: 3/11/24 PHYSICIAN(S): Attending: No Staff Documented Fellow: No Staff Documented INDICATION: Acute respiratory failure with hypoxia (HCC) POST-OP DIAGNOSIS: See the impression below. PREPROCEDURE: Standard airway preparation completed per respiratory therapy protocol.  Informed consent was obtained. Images reviewed prior to the procedure.  A Time Out was performed. No suspicion or identified risk for TB or other airborne infectious disease; bronchoscopy procedure being performed for diagnostic purposes. PROCEDURE: Bronchoscopy DETAILS OF PROCEDURE: Patient was taken to the procedure room where a time out was performed to confirm correct patient and correct procedure. The patient's blood pressure, heart rate,  level of consciousness and oxygen were monitored throughout the procedure. The procedure was not difficult. The patient tolerated the procedure well. There were no apparent adverse events. ANESTHESIA INFORMATION: ASA: ASA status not filed in the log. Anesthesia Type: Anesthesia type not filed in the log. FINDINGS: Normal SPECIMENS: ID Type Source Tests Collected by Time Destination A :  Bronchial Lung, Left Lower Lobe Bronchial Washing FUNGAL CULTURE, VIRUS CULTURE, BRONCHIAL CULTURE AND GRAM STAIN, LEGIONELLA CULTURE, PNEUMOCYSTIS SMEAR BY DFA ([facility]), AFB CULTURE WITH STAIN, LEUKEMIA/LYMPHOMA FLOW CYTOMETRY, CD4/CD8 BRONCHIAL ONLY, NOVEL CORONAVIRUS (COVID-19), PCR [facility] Jarett Martins MD 3/11/2024  1:06 PM       Impression: Pneumonia RECOMMENDATION:  Follow up bronchoscopy     Bronchoscopy    Result Date: 3/17/2024  Narrative: Table formatting from the original result was not included. [facility] [address] [facility] [id number] [phone] DATE OF SERVICE: 3/17/24 PHYSICIAN(S): Attending: Jarett Martins MD Fellow: Miguel Interiano MD INDICATION: Pneumonia of both lungs due to infectious organism, unspecified part of lung POST-OP DIAGNOSIS: See the impression below. PREPROCEDURE: Standard airway preparation completed per respiratory therapy protocol.  Informed consent was obtained. Images reviewed prior to the procedure.  A Time Out was performed. No suspicion or identified risk for TB or other airborne infectious disease; bronchoscopy procedure being performed for diagnostic purposes. PROCEDURE: Bronchoscopy DETAILS OF PROCEDURE: Bronchoscope was brought to patient's bedside where a time out was performed to confirm correct patient and correct procedure. The patient was already under sedation prior to the procedure. The patient's blood pressure, heart rate, oxygen and respirations were monitored throughout the procedure. The patient experienced no blood loss. The scope was  introduced through the tracheostomy. The procedure was not difficult. The patient tolerated the procedure well. There were no apparent adverse events. ANESTHESIA INFORMATION: ASA: ASA status cannot be found on the log. Anesthesia Type: Anesthesia type cannot be found on the log. FINDINGS: The upper trachea, middle trachea, lower trachea and main tracee appeared normal. Moderate, generalized erythematous and friable mucosa in the left main stem, MANDY, LLL, right main stem, RUL, bronchus intermedius, RML and RLL Moderate, thick and purulent secretions present in the left main stem, MANDY, lingula, LLL, right main stem, RUL, bronchus intermedius, RML and RLL; secretions were easily removed by therapeutic aspiration and the airway was cleared SPECIMENS: No sample collected      Impression: Erythematous and friable airways Moderate amount of purulent secretions throughout RECOMMENDATION:  Continue with hypertonic saline nebs and as needed bronchoscopy for airway clearance  I supervised and was present for the entire procedure Jarett Martins MD [facility]     Echo follow up/limited w/ contrast if indicated    Result Date: 3/17/2024  Narrative:   Left Ventricle: The left ventricular ejection fraction is 70%. Systolic function is vigorous. Global longitudinal strain is reduced at -14%. Wall motion is normal.   Aortic Valve: There is mild regurgitation.   Aorta: The aortic root is mildly dilated. The ascending aorta is mildly dilated. The aortic root is 4.10 cm. The ascending aorta is 4.1 cm.     XR chest portable ICU    Result Date: 3/14/2024  Narrative: XR CHEST PORTABLE ICU INDICATION: resp status change. COMPARISON: 3/12/2024 FINDINGS: Unchanged lines and tubes. Worsening consolidation throughout the right lung and left lower lobe consistent with multi lobar pneumonia, possibly aspiration. No pneumothorax or pleural effusion. Normal cardiomediastinal silhouette. Bones are unremarkable  for age. Normal upper abdomen.     Impression: Worsening consolidation throughout the right lung and left lower lobe consistent with multi lobar pneumonia, possibly aspiration. The study was marked in EPIC for immediate notification. Workstation performed: [id number]     CT head wo contrast    Result Date: 3/13/2024  Narrative: CT BRAIN - WITHOUT CONTRAST INDICATION:   declining neuro exam. COMPARISON: CT dated 3/9/2024. TECHNIQUE:  CT examination of the brain was performed.  Multiplanar 2D reformatted images were created from the source data. Radiation dose length product (DLP) for this visit:  840.6 mGy-cm .  This examination, like all CT scans performed in the [facility], was performed utilizing techniques to minimize radiation dose exposure, including the use of iterative reconstruction and automated exposure control. IMAGE QUALITY:  Diagnostic. FINDINGS: PARENCHYMA: Stable postop changes status post left temporal lobectomy. No acute infarct, hemorrhage, mass or mass effect. Stable mild nonspecific white matter changes likely due to chronic microangiopathy. Stable dense calcification in the bilateral basal ganglia and dentate nuclei. VENTRICLES AND EXTRA-AXIAL SPACES:  Normal for the patient's age. VISUALIZED ORBITS: Normal visualized orbits. PARANASAL SINUSES: Left maxillary sinus mucosal thickening. Stable small fluid levels in the sphenoid sinuses. Stable bilateral mastoid effusions, left greater than right. Stable left middle ear effusion.. CALVARIUM AND EXTRACRANIAL SOFT TISSUES: Left temporal craniotomy. NG tube is noted.     Impression: No acute intracranial pathology. Stable postoperative changes status post left temporal lobectomy. Chronic microangiopathy. Stable bilateral mastoid effusions, left greater than right and left middle ear effusion.. Stable sinus inflammatory changes. Workstation performed: [id number]     XR chest portable    Result Date: 3/13/2024  Narrative: XR CHEST  PORTABLE INDICATION: post tracheostomy tube placement. COMPARISON: 3/12/2024 FINDINGS: Tracheostomy is in the typical location and seems satisfactory. Endogastric tube extends out of the field-of-view to the stomach. Left IJ central catheter tip projects at the caval atrial junction. Artifacts projecting over the chest including breathing apparatus and cardiac monitor leads. Patchy infiltrates throughout much of the right lung and infiltrate at the left base. No right-sided pneumothorax appreciated. Very difficult to assess the left apical region due to multiple artifacts. No large volume pneumothorax appreciated. No pleural  fluid seen. Unremarkable cardiomediastinal silhouette.  Atherosclerotic vascular calcifications noted. Bones are unremarkable for age. Normal upper abdomen.     Impression: Limited study. Tracheostomy appears satisfactory in position. Patchy pulmonary infiltrates throughout much of the right lung. Left basilar infiltrate. Otherwise, the left lung appears improved in aeration from prior exam. Recommend continued follow-up Workstation performed: [id number]     XR chest portable ICU    Result Date: 3/12/2024  Narrative: XR CHEST PORTABLE ICU INDICATION: post intubation. COMPARISON: 3/11/2024 at 0 823 FINDINGS: The endotracheal tube terminates 3 cm above the tracee. The left IJ approach catheter terminates in the cavoatrial junction. The endogastric tube terminates below the diaphragm beyond the imaged limits. Redemonstrated are diffuse patchy airspace opacities. No pneumothorax or pleural effusion. Normal cardiomediastinal silhouette. Bones are unremarkable for age. Normal upper abdomen.     Impression: Tubes and lines in satisfactory position. Grossly unchanged diffuse parenchymal disease. Workstation performed: [id number]     XR chest portable ICU    Result Date: 3/12/2024  Narrative: XR CHEST PORTABLE ICU INDICATION: post intubation. COMPARISON: 3/11/2024 at 0 823 FINDINGS: The endotracheal tube  terminates 3 cm above the tracee. The left IJ approach catheter terminates in the cavoatrial junction. The endogastric tube terminates below the diaphragm beyond the imaged limits. Redemonstrated are diffuse patchy airspace opacities. No pneumothorax or pleural effusion. Normal cardiomediastinal silhouette. Bones are unremarkable for age. Normal upper abdomen.     Impression: Tubes and lines in satisfactory position. Grossly unchanged diffuse parenchymal disease. Workstation performed: [id number]     Bronchoscopy    Result Date: 3/12/2024  Narrative: [facility] Endoscopy [address] [facility] [id number] [phone] DATE OF SERVICE: 3/12/24 PHYSICIAN(S): Attending: Dr. Jarett Martins Fellow: Dr. Nigel Escobar, PGY IV INDICATION: Tracheostomy present (HCC), Pre- and Post- Tracheostomy placement POST-OP DIAGNOSIS: See the impression below. PREPROCEDURE: Standard airway preparation completed per respiratory therapy protocol.  Informed consent was obtained. Images reviewed prior to the procedure.  A Time Out was performed. No suspicion or identified risk for TB or other airborne infectious disease; bronchoscopy procedure being performed for diagnostic purposes. PROCEDURE: Bronchoscopy DETAILS OF PROCEDURE: Patient was taken to the procedure room where a time out was performed to confirm correct patient and correct procedure. The patient was already under sedation prior to the procedure. The patient's blood pressure, ECG, heart rate, level of consciousness, oxygen and respirations were monitored throughout the procedure. The patient experienced no blood loss. The scope was introduced through the endotracheal tube. The procedure was not difficult. The patient tolerated the procedure well. There were no apparent adverse events. ANESTHESIA INFORMATION: ASA: ASA status cannot be found on the log. Anesthesia Type: Anesthesia type cannot be found on the log. FINDINGS: Moderate, thick, purulent and white  secretions present in the left lower lobar bronchus, left superior segment (LB6), left anterior basal segment (LB7+8), left lateral basal segment (LB9), left posterior basal segment (LB10), bronchus intermedius, right lower lobar bronchus, right superior segment (RB6), right medial basal segment (RB7), right anterior basal segment (RB8), right lateral basal segment (RB9) and right posterior basal segment (RB10); secretions were easily removed and the airway was cleared The lower trachea, main tracee, left main stem, left upper lobar bronchus, left apical-posterior segment (LB1+2), left upper anterior segment (LB3), lingular lobar bronchus, superior lingular segment (LB4), inferior lingular segment (LB5), right main stem, right upper lobar bronchus, right apical segment (RB1), right posterior segment (RB2), right anterior segment (RB3), right middle lobar bronchus, right lateral segment (RB4) and right medial segment (RB5) appeared normal. SPECIMENS: No specimens collected for this procedure      Impression: Excessive secretions again seen from lower lobes bilaterally, pre-tracheostomy. Post-Tracheostomy bronchoscopy showing minimal procedural bleeding, and without significant secretions. I was present and supervised the entire procedure Jarett Martins MD [facility]      bedside procedure    Result Date: 3/12/2024  Narrative: [id number]     bedside procedure    Result Date: 3/12/2024  Narrative: [id number]    Bronchoscopy    Result Date: 3/11/2024  Narrative: Table formatting from the original result was not included. [facility] Endoscopy [address] [facility] [id number] [phone] DATE OF SERVICE: 3/11/24 PHYSICIAN(S): Attending: Dr. Jarett Martins Fellow: Dr. Nigel Escobar, PGY IV INDICATION: Acute respiratory failure with hypoxia (HCC) POST-OP DIAGNOSIS: See the impression below. PREPROCEDURE:  Standard airway preparation completed per respiratory therapy protocol.  Informed consent was obtained. Images reviewed prior to the procedure.  A Time Out was performed. No suspicion or identified risk for TB or other airborne infectious disease; bronchoscopy procedure being performed for diagnostic purposes. PROCEDURE: Bronchoscopy DETAILS OF PROCEDURE: Patient was taken to the procedure room where a time out was performed to confirm correct patient and correct procedure. The patient was already under sedation prior to the procedure. The patient's blood pressure, ECG, heart rate, oxygen, level of consciousness and respirations were monitored throughout the procedure. The patient experienced no blood loss. The scope was introduced through the endotracheal tube. The procedure was not difficult. The patient tolerated the procedure well. There were no apparent adverse events. ANESTHESIA INFORMATION: ASA: ASA status cannot be found on the log. Anesthesia Type: Anesthesia type cannot be found on the log. FINDINGS: Excessive, thick, purulent and white secretions present in the left lower lobar bronchus, left superior segment (LB6), left anterior basal segment (LB7+8), left lateral basal segment (LB9), left posterior basal segment (LB10), bronchus intermedius, right lower lobar bronchus, right superior segment (RB6), right medial basal segment (RB7), right anterior basal segment (RB8), right lateral basal segment (RB9) and right posterior basal segment (RB10); secretions were easily removed and the airway was cleared; performed washing, sent sample for microbiology analysis The left main stem, left upper lobar bronchus, left apical-posterior segment (LB1+2), left upper anterior segment (LB3), lingular lobar bronchus, superior lingular segment (LB4), inferior lingular segment (LB5), right main stem, right upper lobar bronchus, right apical segment (RB1), right posterior segment (RB2) and right anterior segment (RB3)  appeared normal. SPECIMENS: Collected for cultures, cell count, and cytology      Impression: Thick, purulent secretions mainly in the b/l lower lobe segments. RUL, RML, and MANDY were normal. RECOMMENDATION:  F/u gram stain and culture, viral culture, bacterial culture, PCP, fungal culture, COVID  I supervised and was present for the entire procedure Jarett Martins MD [facility]     XR chest portable    Result Date: 3/11/2024  Narrative: XR CHEST PORTABLE INDICATION: tachypnea. COMPARISON: CXR and chest CT 3/10/2024. FINDINGS: NG tube in stomach. Left ocular catheter at cavoatrial junction. Persistent extensive bilateral groundglass opacity. No pneumothorax or pleural effusion. Normal cardiomediastinal silhouette. Bones are unremarkable for age. Normal upper abdomen.     Impression: Persistent extensive bilateral groundglass opacity. Workstation performed: [id number]     XR abdomen 1 view kub    Result Date: 3/11/2024  Narrative: XR ABDOMEN 1 VIEW KUB INDICATION: abdominal distension. COMPARISON: None FINDINGS: Enteric catheter courses into the distal stomach. Colostomy in the right lower quadrant Nonobstructive bowel gas pattern. No evidence of pneumoperitoneum on this supine study. Upright or left lateral decubitus imaging is more sensitive to detect subtle free air in the appropriate setting. Bilateral renal calcifications better assessed on CT. Pulmonary opacities better assessed on dedicated chest imaging. Bones are unremarkable for the patient's age.     Impression: Nonobstructive bowel gas pattern. Workstation performed: [id number]     CT chest abdomen pelvis wo contrast    Result Date: 3/10/2024  Narrative: CT CHEST, ABDOMEN AND PELVIS WITHOUT IV CONTRAST INDICATION: concern for infection. COMPARISON: 3/6/2024. TECHNIQUE: CT examination of the chest, abdomen and pelvis was performed without intravenous contrast. Multiplanar 2D reformatted images were created from the  source data. This examination, like all CT scans performed in the [facility], was performed utilizing techniques to minimize radiation dose exposure, including the use of iterative reconstruction and automated exposure control. Radiation dose length product (DLP) for this visit: 549.64 mGy-cm Enteric Contrast: Not administered. FINDINGS: CHEST LUNGS: Extensive groundglass and nodular consolidation throughout both lung fields with more focal peripheral opacification particularly at the lung bases. Extensive regions of bronchiectasis most notable at the lung bases. PLEURA: Unremarkable. HEART/GREAT VESSELS: Heart is unremarkable for patient's age. No thoracic aortic aneurysm with top normal size of the ascending thoracic aorta measuring 39 mm at the level of the left main pulmonary artery. MEDIASTINUM AND KIRBY: Unremarkable. CHEST WALL AND LOWER NECK: Left central line terminates at the caval atrial junction. ABDOMEN LIVER/BILIARY TREE: Unremarkable. GALLBLADDER: Vicarious excretion. SPLEEN: Unremarkable. PANCREAS: Unremarkable. ADRENAL GLANDS: Unremarkable. 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 Mild right renal fullness as before without distal obstructive pathology. STOMACH AND BOWEL: Nasogastric tube terminates at the gastroduodenal junction. Right lower quadrant ileostomy with a patent stoma after ileocolectomy. Diverticular disease also noted without diverticulitis. Edema and small foci of subcutaneous emphysema after recent intervention. No drainable collection. APPENDIX: No findings to suggest appendicitis. ABDOMINOPELVIC CAVITY: No ascites. No pneumoperitoneum. No lymphadenopathy. VESSELS: Unremarkable for patient's age. PELVIS REPRODUCTIVE ORGANS: Unremarkable for patient's age. URINARY BLADDER: Decompressed with a Ortiz catheter in place. ABDOMINAL WALL/INGUINAL REGIONS: Unremarkable. BONES: No acute  fracture or suspicious osseous lesion.     Impression: 1. Persistent bilateral groundglass and nodular consolidation with peripheral opacification at the right greater than left lung bases. Findings remain concerning for multi lobar infiltrates with possible superimposed COVID-19 opacities. 2. Status post ileocolectomy with a right lower quadrant ileostomy again exhibiting a patent stoma. Persistent inflammatory change and subcutaneous emphysema after recent intervention. No drainable collection. 3. Bilateral renal calcifications again suggestive of medullary sponge kidney with persistent mild right renal fullness but no evidence of distal obstructive pathology. Workstation performed: [id number]     XR chest portable    Result Date: 3/10/2024  Narrative: XR CHEST PORTABLE INDICATION: sob. COMPARISON: CXR 3/9/2024 and 3/7/2024, chest CT 3/6/2024. FINDINGS: NG tube in stomach. Left upper catheter at cavoatrial junction. Persistent extensive bilateral groundglass opacity. No pneumothorax or pleural effusion. Normal cardiomediastinal silhouette. Bones are unremarkable for age. Normal upper abdomen.     Impression: Persistent extensive bilateral groundglass opacity. Workstation performed: [id number]     CT head wo contrast    Result Date: 3/9/2024  Narrative: CT BRAIN - WITHOUT CONTRAST INDICATION:   Stroke Alert. COMPARISON: CT head from 3/6/2024 and priors, MRI of the brain from 1/10/2024 TECHNIQUE:  CT examination of the brain was performed.  Multiplanar 2D reformatted images were created from the source data. Radiation dose length product (DLP) for this visit:  859.76 mGy-cm .  This examination, like all CT scans performed in the [facility], was performed utilizing techniques to minimize radiation dose exposure, including the use of iterative  reconstruction and automated exposure control. IMAGE QUALITY:  Diagnostic. FINDINGS: PARENCHYMA: Stable postoperative changes from left temporal lobectomy.  Decreased attenuation is noted in periventricular and subcortical white matter demonstrating an appearance that is statistically most likely to represent mild microangiopathic change. Prominent calcifications within bilateral basal ganglia and dentate nuclei. No CT signs of acute vascular territory infarction.  No intracranial mass, mass effect or midline shift.  No acute parenchymal hemorrhage. VENTRICLES AND EXTRA-AXIAL SPACES:  Normal for the patient's age. VISUALIZED ORBITS: Normal visualized orbits. PARANASAL SINUSES: Mild polypoid mucosal thickening of the left maxillary sinus. Layering fluid within bilateral sphenoid sinuses CALVARIUM AND EXTRACRANIAL SOFT TISSUES: No acute osseous abnormalities. Sequela of left temporal craniotomy. Large left and moderate right mastoid effusions, unchanged. Left middle ear effusion.     Impression: -No acute intracranial abnormality. Stable microangiopathic changes. -Stable postoperative changes related to left temporal lobectomy. -Redemonstrated air-fluid levels within bilateral sphenoid sinuses, left greater than right mastoid effusions and left middle ear effusion. Clinical correlation advised. I personally communicated the findings via telephone with Ravi Garcia at 3:06 p.m. on 3/9/2024. Workstation performed: [id number]     XR chest portable    Result Date: 3/9/2024  Narrative: XR CHEST PORTABLE INDICATION: tachypnea, hypoxia. COMPARISON: Chest radiograph 3/7/2024; CT chest abdomen pelvis 3/6/2024 FINDINGS: Left-sided central line with tip at the cavoatrial junction. Enteric tube tip below the level of the diaphragm. No significant interval change in multifocal patchy groundglass opacities, left side greater than right. No pneumothorax or pleural effusion. Normal cardiomediastinal silhouette. Bones are unremarkable for age. Normal upper abdomen.     Impression: No significant interval change in multifocal patchy groundglass opacities, left side greater than right.  Findings are likely related to patient's COVID-19 pneumonia. Resident: SANDIP Nieto I, the attending radiologist, have reviewed the images and agree with the final report above. Workstation performed: [id number]     XR chest portable ICU    Result Date: 3/7/2024  Narrative: XR CHEST PORTABLE ICU INDICATION: sob. Patient has confirmed COVID-19. COMPARISON: Portable chest from March 5, 2024. CT of the chest, abdomen and pelvis from March 6, 2024. FINDINGS: NG tube extends into the stomach. Tip of left-sided IJ catheter in superior vena cava. Patchy opacification throughout the right lung, improved since 3/5/2024. Persistent opacification in portions of the left lower lobe. No pneumothorax or pleural effusion. Normal cardiomediastinal silhouette. Bones are unremarkable for age. Normal upper abdomen.     Impression: Diffuse patchy opacification in the right lung, either pneumonia or asymmetric pulmonary edema, improved in appearance since 3/5/2024. Persistent atelectasis and/or consolidation in the base of the left lower lobe. Workstation performed: [id number]     CT chest abdomen pelvis wo contrast    Result Date: 3/6/2024  Narrative: CT CHEST, ABDOMEN AND PELVIS WITHOUT IV CONTRAST INDICATION: worsening SOB, abd pain. COVID-positive. COMPARISON: CT chest abdomen pelvis 3/4/2024. CT abdomen pelvis 10/21/2020. CT chest abdomen pelvis 2/20/2024. TECHNIQUE: CT examination of the chest, abdomen and pelvis was performed without intravenous contrast. Multiplanar 2D reformatted images were created from the source data. This examination, like all CT scans performed in the [facility], was performed utilizing techniques to minimize radiation dose exposure, including the use of iterative reconstruction and automated exposure control. Radiation dose length product (DLP) for this visit: 1086.03 mGy-cm Enteric Contrast: Not administered. FINDINGS: CHEST LUNGS: Evaluation partially limited by motion artifact.  Increased extent of groundglass opacities and patchy consolidations throughout the lungs, particularly increased in the upper lobes compared to prior CT 3/4/2024. Lobular consolidation in the posterior lung bases is not significantly changed since most recent exam and is suspicious for atelectasis and bacterial pneumonia. Right middle lobe patchy opacity appears unchanged from most recent exam. PLEURA: Unremarkable. HEART/GREAT VESSELS: Mild atherosclerotic coronary artery calcifications. Unchanged fusiform ectasia of the ascending thoracic aorta measuring up to 40 mm (series 303 image 59). Recommendation is for follow-up low radiation dose chest CT in one year. Left IJ CVC terminating in the distal SVC. MEDIASTINUM AND KIRBY: Unremarkable. CHEST WALL AND LOWER NECK: Prior left hemithyroidectomy. ABDOMEN LIVER/BILIARY TREE: Unremarkable. GALLBLADDER: Redemonstrated layering high density in the dependent lumen of the gallbladder. Likely vicarious excretion of injected IV contrast versus layering sludge. No gallbladder wall thickening. No pericholecystic fluid. SPLEEN: Subcentimeter splenic hypodensity (303 image 121) seen dating back to December 2023, too small to characterize but statistically likely benign. PANCREAS: Unremarkable. ADRENAL GLANDS: Unremarkable. KIDNEYS/URETERS: Bilateral renal cysts. Multiple bilateral renal calculi/calcifications again noted, again possibly due to medullary sponge kidney. Mild right pelvocaliectasis and ureterectasis. There is also fullness of the left renal pelvis. No obstructing calculi. STOMACH AND BOWEL: Enteric tube with tip terminating at the distal stomach. Postsurgical changes of ileocolectomy with end ileostomy. No bowel obstruction. Colonic diverticulosis without findings of acute diverticulitis. APPENDIX: Surgically absent. ABDOMINOPELVIC CAVITY: No ascites. No pneumoperitoneum. No lymphadenopathy. VESSELS: Atherosclerosis without abdominal aortic aneurysm. PELVIS  REPRODUCTIVE ORGANS: Unremarkable for patient's age. URINARY BLADDER: Ortiz catheter within the bladder. Moderately increasing gas in the urinary bladder, likely secondary to Ortiz placement. ABDOMINAL WALL/INGUINAL REGIONS: Postsurgical changes of the anterior abdominal wall. Redemonstrated edema and subcutaneous gas within the anterior abdominal wall fat surrounding the right lower quadrant ostomy site, amount of gas slightly decreased from  prior. No discrete fluid collection. Soft tissue densities in the anterior abdominal wall may be related to subcutaneous injections. BONES: No acute fracture or suspicious osseous lesion.     Impression: 1. Increased extent of groundglass opacities and patchy consolidations throughout the lungs. Focal consolidations in the posterior lung bases and right middle lobe are not significantly changed compared to March 4, 2024. Again, findings are likely due to  a combination of COVID-19 and bacterial pneumonia. 2. Redemonstrated edema and subcutaneous gas within the anterior abdominal wall fat surrounding the right lower quadrant ostomy site, amount of gas slightly decreased from prior. 4. Mild right pelvocaliectasis and ureterectasis. No obstructing calculi. 5. Fusiform ectasia of the ascending thoracic aorta measuring 40 mm. Recommend follow-up low radiation dose chest CT in 1 year. The study was marked in EPIC for immediate notification. Resident: LOUISE LEUNG I, the attending radiologist, have reviewed the images and agree with the final report above. Workstation performed: [id number]     CT head wo contrast    Result Date: 3/6/2024  Narrative: CT BRAIN - WITHOUT CONTRAST INDICATION:   ams. COMPARISON: MRI brain 1/10/2024 TECHNIQUE:  CT examination of the brain was performed.  Multiplanar 2D reformatted images were created from the source data. Radiation dose length product (DLP) for this visit:  791.12 mGy-cm .  This examination, like all CT scans performed in the [facility], was performed utilizing techniques to minimize radiation dose exposure, including the use of iterative  reconstruction and automated exposure control. IMAGE QUALITY:  Diagnostic. FINDINGS: PARENCHYMA: Stable postoperative changes related to left temporal lobectomy. Decreased attenuation is noted in periventricular and subcortical white matter demonstrating an appearance that is statistically most likely to represent mild microangiopathic change. Prominent bilateral basal ganglia and dentate nuclei calcifications. No CT signs of acute infarction.  No intracranial mass, mass effect or midline shift.  No acute parenchymal hemorrhage. VENTRICLES AND EXTRA-AXIAL SPACES:  Ventricles and extra-axial CSF spaces are prominent commensurate with the degree of volume loss.  No hydrocephalus.  No acute extra-axial hemorrhage. VISUALIZED ORBITS: Normal visualized orbits. PARANASAL SINUSES: Mild right maxillary sinus mucosal thickening with superimposed retention cysts. New air-fluid levels within the left maxillary and bilateral sphenoid sinuses. CALVARIUM AND EXTRACRANIAL SOFT TISSUES: Left temporal craniotomy. Opacification of the bilateral mastoid air cells, new since 1/8/2024     Impression: No acute intracranial abnormality. Stable postoperative changes related to left temporal lobectomy. Mild chronic microangiopathic ischemic changes. New air-fluid levels within the left maxillary and bilateral sphenoid sinuses suggestive of acute on chronic sinusitis. New bilateral mastoid fluid. Workstation performed: [id number]     XR chest portable ICU    Result Date: 3/5/2024  Narrative: XR CHEST PORTABLE ICU INDICATION: confirm for ng tube. COMPARISON: Chest radiograph performed earlier the same day. FINDINGS: Interval placement of enteric tube which courses below the level of the diaphragm and tip is beneath the field-of-view. Sidehole projects over the stomach. Left central catheter in the lower SVC as  before. Patchy opacities are noted in both lungs, right greater than left. No pneumothorax or pleural effusion. Normal cardiomediastinal silhouette. Bones are unremarkable for age. Normal upper abdomen.     Impression: Enteric tube placement as above. Bilateral patchy opacities, right greater than left. Workstation performed: [id number]     XR chest portable ICU    Result Date: 3/5/2024  Narrative: XR CHEST PORTABLE ICU INDICATION: Hypoxia. COVID-positive 2/21/2024. COMPARISON: CXR and chest CT 3/4/2024. FINDINGS: Left central catheter in lower SVC. Persistent extensive bilateral groundglass opacity. No pneumothorax or pleural effusion. Normal cardiomediastinal silhouette. Bones are unremarkable for age. Normal upper abdomen.     Impression: Persistent extensive bilateral groundglass opacity. Workstation performed: [id number]     CT chest abdomen pelvis w contrast    Result Date: 3/4/2024  Narrative: CT CHEST, ABDOMEN AND PELVIS WITH IV CONTRAST INDICATION: Abdominal pain. Severe COVID at time of admission. Recent bacterial pneumonia. COMPARISON: February 26, 2024 TECHNIQUE: CT examination of the chest, abdomen and pelvis was performed. Multiplanar 2D reformatted images were created from the source data. This examination, like all CT scans performed in the [facility], was performed utilizing techniques to minimize radiation dose exposure, including the use of iterative reconstruction and automated exposure control. Radiation dose length product (DLP) for this visit: 603.66 mGy-cm IV Contrast: 100 mL of iohexol (OMNIPAQUE) Enteric Contrast: Not administered. FINDINGS: CHEST LUNGS: Combination of groundglass and consolidative airspace opacities are seen throughout the lungs with relative sparing of the anterior pulmonary parenchyma. Groundglass density, predominant in the central mid and upper lung zones, is now developing what appears to be more fibrotic appearance including some traction  bronchiolectasis. This finding appears to represent evolving sequela of pneumonitis related to  viral pneumonia (COVID). Alveolar consolidation in the posterior lung bases featuring associated volume loss and enhancement is improved as compared with previous examination suspicious for a combination of bacterial pneumonia and atelectasis. New dense patchy opacity in the lateral aspect of the right middle lobe with both groundglass and alveolar consolidative properties on image 58 of series 301 suspicious for worsening bacterial pneumonia in that location. PLEURA: Unremarkable. HEART/GREAT VESSELS: Left internal jugular central venous catheter, tip at cavoatrial junction. Heart is unremarkable for patient's age. Borderline fusiform ectasia of ascending thoracic aorta up to 39 mm. MEDIASTINUM AND KIRBY: No mediastinal or hilar lymphadenopathy. Enteric tube in the esophagus extending into the stomach. CHEST WALL AND LOWER NECK: Unremarkable. ABDOMEN LIVER/BILIARY TREE: Unremarkable. GALLBLADDER: Layering high density in the dependent lumen of the gallbladder fundus, probably vicarious excretion of injected intravenous contrast, though alternatively this could represent layering sludge. No gallbladder wall thickening or pericholecystic inflammatory edema. SPLEEN: Unremarkable. PANCREAS: Unremarkable. ADRENAL GLANDS: Unremarkable. KIDNEYS/URETERS: Lobulated renal contours related to scarring. Bilateral renal cysts. No solid renal mass, urinary tract calculus, or hydronephrosis. STOMACH AND BOWEL: Enteric tube tip within the stomach. No bowel thickening or bowel obstruction. Surgical changes of segmental bowel resection with right mid abdominal ostomy. Subcutaneous gas and cellulitic changes noted surrounding the ostomy site suggesting infection. No drainable abscess collection. No bowel obstruction. APPENDIX: Surgically removed. ABDOMINOPELVIC CAVITY: Small volume of free pelvic ascites with some layering high density in  "its dependent portion. No abscess. No pneumoperitoneum. No lymphadenopathy. VESSELS: Unremarkable for patient's age. PELVIS REPRODUCTIVE ORGANS: Unremarkable for patient's age. URINARY BLADDER: Bubble of gas within the urinary bladder presumably related to recent instrumentation. No bladder wall mass. ABDOMINAL WALL/INGUINAL REGIONS: As mentioned above, cellulitic edema and subcutaneous gas within the body wall fat surrounding the ostomy site. BONES: No acute fracture or suspicious osseous lesion.     Impression: Evolving changes in the lungs which represent some combination of evolving pneumonitis secondary to viral COVID infection, improving atelectasis/bacterial infection in the posterior lower lung zones, and developing focal bacterial type pneumonia in the lateral aspect of the right middle lobe. Cellulitic edema and subcutaneous gas in the fatty soft tissue surrounding the ostomy site in the right mid abdomen. This probably represents infectious cellulitis and air tracking backward from the ostomy site. No drainable abscess seen. Small volume of complex ascites again noted in the dependent hemipelvis. This examination was marked \"immediate notification\" in Epic in order to begin the standard process by which the radiology reading room liaison alerts the referring practitioner. Workstation performed: [id number]     XR chest portable ICU    Result Date: 3/4/2024  Narrative: XR CHEST PORTABLE ICU INDICATION: sob. COMPARISON: 2/28/2024 FINDINGS: Endogastric tube extends to the left upper quadrant of the abdomen. Artifacts project over the chest and upper abdomen. A left IJ central venous catheter has the tip projecting in the right atrial region. Patchy bilateral pulmonary infiltrates are present mostly in the mid to lower lung fields. This may represent bilateral pneumonia or perhaps patchy pulmonary edema. Correlate with clinical scenario. There may be minimal left pleural effusion. No right-sided effusion. No " visible pneumothorax. Unremarkable cardiomediastinal silhouette.  Atherosclerotic vascular calcifications noted. Bones are unremarkable for age. Normal upper abdomen.     Impression: Line and tube appear satisfactory. Patchy bilateral pulmonary infiltrates which may be pneumonia or pulmonary edema. Possible small left pleural effusion Workstation performed: [id number]     US bedside procedure    Result Date: 2/29/2024  Narrative: [id number]    XR shoulder 2+ vw left    Result Date: 2/29/2024  Narrative: XR SHOULDER 2+ VW LEFT INDICATION: shoulder placement. COMPARISON: None FINDINGS: No acute fracture or dislocation. No significant degenerative changes. No lytic or blastic osseous lesion. Unremarkable soft tissues.     Impression: No acute osseous abnormality. Workstation performed: [id number]     XR chest portable ICU    Result Date: 2/28/2024  Narrative: XR CHEST PORTABLE ICU INDICATION: pneumonitis. COMPARISON: 2/25/2024 FINDINGS: The tip of the endotracheal tube is 4.7 cm above the tracee. An endogastric tube extends below the diaphragm with the sideport probably in the gastric cardia region. Esophageal temperature probe tip terminates at about the mid esophageal level.  Left-sided IJ central venous catheter tip projects near the caval atrial junction. Relatively diffuse mixed interstitial and alveolar opacity in the left lung and more of a reticulonodular infiltrate involving predominantly the mid to lower right lung. Asymmetric edema or pneumonia to be considered. No gross evidence of significant pleural fluid. No pneumothorax seen. No mediastinal shift. Normal cardiomediastinal silhouette. Bones are unremarkable for age. Normal upper abdomen.     Impression: Lines and tubes appear satisfactory. Bilateral left greater than right infiltrates which could be asymmetric pulmonary edema or pneumonia. Correlate with clinical scenario Workstation performed: [id number]       I have  personally reviewed labs, imaging studies, and pertinent reports.      This note has been generated by voice recognition software system.  Therefore, there may be spelling, grammar, and or syntax errors. Please contact if questions arise.     Cosmo Sin DO   Hematology and Medical Oncology - PGY IV  [facility]

## 2024-03-28 NOTE — PROGRESS NOTES
Progress Note - Infectious Disease   Carla Hunt 59 y.o. female MRN: [id number]  [facility]: [id number]      Impression/Plan:    1. Acute hypoxic respiratory failure, likely multifactorial, with both COVID and bacterial pneumonia contributing.  Also consider persistent inflammation, fibrosis as seems quite steroid responsive in past.  Most recently extubated 3/1.  Patient with worsening respiratory status requiring intubation again 3/11.  Suspect ongoing hypoxia related to persistent COVID-pneumonia, superimposed bacterial infection, inflammatory component/lung fibrosis related to COVID, now with profound deconditioning and muscle weakness.  Status post bronchoscopy and trach 3/12 with excessive secretions seen from the lower lobes bilaterally.  BAL culture from 3/11 shows heavy growth of Stenotrophomonas maltophilia.  PJP DFA negative. While the patient has grown this before and she certainly may be colonized, given worsening CT findings, intubation and prolonged steroids would treat as a true pathogen.  Status post 10 days of vancomycin and cefepime, 14-day course of remdesivir/Paxlovid 3/15, 14-day course of antibiotics with minocycline for stenotrophomonas pneumonia. Status post repeat bronchoscopy 3/17 for airway clearance with continued thick secretions. Low concern for uncontrolled infection contributing to clinical picture. Respiratory status improving, patient now on trach collar 8L O2. No fevers.   -Monitor off additional antibiotics  -Steroid dosing per critical care, tolerating weaning   -No indication to treat Candida in respiratory culture, this is respiratory colonization  -If clinically deteriorates with concern for progressive sepsis would start meropenem 1g IV Q8   -Fungitell is positive but low clinical concern for invasive fungal infection at this time; recent antibiotics, blood transfusion, candida in sputum may be causing false positive result.  The patient has been  improving without any antifungal therapy and empiric therapy raises risk of further affecting normal lauren.  Will continue to monitor.  -For completeness, follow up histoplasma urine antigen     2. SIRS versus sepsis.  Fluctuating fever, WBC.  Likely due to persistent COVID, bacterial pneumonia. CT C/A/P 3/10 shows stable groundglass and nodular opacities, inflammation around stoma. Now with dehiscence of her abdominal wound following staple removal, status post wound VAC placement 3/13. Patient afebrile, without leukocytosis, weaning on trach collar. No new clinical signs of infection  -Follow temperatures closely  -Recheck WBC in AM to monitor infection  -Supportive care as per the primary service     3. Recurrent bacterial pneumonia.  The patient has completed multiple treatment courses over the hospitalization. Prior BAL cultures with Pseudomonas and stenotrophomonas.  Unclear if pathogens or colonizers.  Status post 10 days levofloxacin.  CT C/A/P showed evolving changes likely all due to sequelae of COVID, infections.  Noted to have worsening hypoxia and purulent secretions on bronchoscopy 3/11.  BAL culture with heavy growth of Stenotrophomonas maltophilia, Candida.  Completed 14-day course of minocycline 3/27              -Monitor off further antibiotics              -Wean O2 as able     4. Severe COVID, present on admission.  Patient was treated with a 10-day course of remdesivir, dexamethasone and was given 1 dose of Tocilizumab on admission.  COVID antigen was negative prior to coming off isolation.  Had positive COVID PCR from BAL 2/16, status post another 5-day course of remdesivir.  Patient has remained on high-dose systemic corticosteroid throughout her hospitalization.  COVID antigen positive and PCR is also positive 3/3 and Ct 26.8, consistent with high viral replication.  Repeat PCR positive with Ct closer to 30. Status post 14-day course of remdesivir/Paxlovid 3/15/2024.  Rapid COVID antigen  negative 3/25 and 3/27  -Steroid wean per ICU  -No additional antivirals indicated  -If remains on high-dose steroids patient will eventually require PJP prophylaxis.  Due to pancytopenia would use atovaquone     5. 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  Patient is status post exploratory laparotomy with ileocecectomy and end ileostomy creation 2/20.   End ileostomy is with ongoing ischemic changes which is likely contributing to the imaging findings and ongoing SIRS response.  Now with wound dehiscence status post staple removal, status post wound VAC placement 3/13, removed but replaced due to bleeding  -Serial exams  -Surgery follow-up   -no current signs of infection, need for antibiotics      B-cell lymphoma, on maintenance rituxan, which is currently postponed.  Rituximab is a risk factor for persistent COVID infection.    7.  ELIESER.  More likely due to hypovolemia since creatinine was increasing prior to starting Bactrim.  Creatinine now improving   -Monitor creatinine closely   -Dose adjust antibiotics as needed    8.  Anemia, thrombocytopenia, lymphopenia/neutropenia.  Patient has had persistent lymphopenia throughout this admission, likely due to rituximab, viral infection, high-dose steroids.  Now with worsening anemia, neutropenia, and thrombocytopenia.  Bactrim discontinued 3/18. CMV PCR negative. Hgb now stable after transfusion but white blood count and platelets continue to worsen.  Not a likely side effect of minocycline.  Immunoglobulins are low   -Steroid wean per primary   -Transfusion support per primary   -Hematology consulted, recommending Granix as unable to perform BM Bx due to positioning, other co morbidities     Above management plan to monitor off further antibiotics discussed with the critical care team who is in agreement.  ID will continue to follow.    Antibiotics:  Off antibiotics     Subjective:  The patient was started on Granix yesterday by hematology.  Overall  she is clinically stable today, no changes.  Remains lethargic but follows commands.  On 8 L O2 via trach collar.  She has no fevers.    Objective:  Vitals:  Temp:  [97.7 °F (36.5 °C)-98.7 °F (37.1 °C)] 98.7 °F (37.1 °C)  HR:  [118-133] 130  Resp:  [17-23] 23  BP: (101-150)/(56-83) 146/78  SpO2:  [94 %-100 %] 98 %  Temp (24hrs), Av.2 °F (36.8 °C), Min:97.7 °F (36.5 °C), Max:98.7 °F (37.1 °C)  Current: Temperature: 98.7 °F (37.1 °C)    Physical Exam:   General Appearance:  Ill-appearing, arousable   Neck: Trach in place.   Lungs:   Rhonchi bilaterally   Heart:  RRR; no murmur, rub or gallop   Abdomen:   Midline ostomy with brown stool, wound VAC in place   Extremities: No edema   Skin: No new rashes or lesions.        Labs:   All pertinent labs and imaging studies were personally reviewed  Results from last 7 days   Lab Units 24  0532 24  0545 24  0548   WBC Thousand/uL 1.07* 0.91* 0.90*   HEMOGLOBIN g/dL 7.8* 7.7* 8.3*   PLATELETS Thousands/uL 34* 43* 27*     Results from last 7 days   Lab Units 24  0532 24  0545 24  2046 24  0523 24  0437 24  0519   SODIUM mmol/L 145 146 144   < > 150* 150*   POTASSIUM mmol/L 3.1* 4.0 3.8   < > 4.4 4.8   CHLORIDE mmol/L 114* 114* 115*   < > 119* 116*   CO2 mmol/L 19* 20* 20*   < > 23 21   BUN mg/dL 62* 56* 54*   < > 68* 75*   CREATININE mg/dL 0.83 0.83 0.81   < > 0.99 1.12   EGFR ml/min/1.73sq m 77 77 80   < > 63 54   CALCIUM mg/dL 9.7 9.3 8.9   < > 9.2 8.8   AST U/L  --   --   --   --  13 21   ALT U/L  --   --   --   --  41 40   ALK PHOS U/L  --   --   --   --  115* 98    < > = values in this interval not displayed.           Results from last 7 days   Lab Units 24  0523 24  0519   CRP mg/L 256.9* 249.3*                       Imaging:  CXR personally reviewed and shows mild improvement in multifocal bronchopneumonia

## 2024-03-28 NOTE — PLAN OF CARE
Problem: Prexisting or High Potential for Compromised Skin Integrity  Goal: Skin integrity is maintained or improved  Description: INTERVENTIONS:  - Identify patients at risk for skin breakdown  - Assess and monitor skin integrity  - Assess and monitor nutrition and hydration status  - Monitor labs   - Assess for incontinence   - Turn and reposition patient  - Assist with mobility/ambulation  - Relieve pressure over bony prominences  - Avoid friction and shearing  - Provide appropriate hygiene as needed including keeping skin clean and dry  - Evaluate need for skin moisturizer/barrier cream  - Collaborate with interdisciplinary team   - Patient/family teaching  - Consider wound care consult   Outcome: Progressing     Problem: Nutrition/Hydration-ADULT  Goal: Nutrient/Hydration intake appropriate for improving, restoring or maintaining nutritional needs  Description: Monitor and assess patient's nutrition/hydration status for malnutrition. Collaborate with interdisciplinary team and initiate plan and interventions as ordered.  Monitor patient's weight and dietary intake as ordered or per policy. Utilize nutrition screening tool and intervene as necessary. Determine patient's food preferences and provide high-protein, high-caloric foods as appropriate.     INTERVENTIONS:  - Monitor oral intake, urinary output, labs, and treatment plans  - Assess nutrition and hydration status and recommend course of action  - Evaluate amount of meals eaten  - Assist patient with eating if necessary   - Allow adequate time for meals  - Recommend/ encourage appropriate diets, oral nutritional supplements, and vitamin/mineral supplements  - Order, calculate, and assess calorie counts as needed  - Recommend, monitor, and adjust tube feedings and TPN/PPN based on assessed needs  - Assess need for intravenous fluids  - Provide specific nutrition/hydration education as appropriate  - Include patient/family/caregiver in decisions related to  nutrition  Outcome: Progressing     Problem: INFECTION - ADULT  Goal: Absence or prevention of progression during hospitalization  Description: INTERVENTIONS:  - Assess and monitor for signs and symptoms of infection  - Monitor lab/diagnostic results  - Monitor all insertion sites, i.e. indwelling lines, tubes, and drains  - Monitor endotracheal if appropriate and nasal secretions for changes in amount and color  - [place] appropriate cooling/warming therapies per order  - Administer medications as ordered  - Instruct and encourage patient and family to use good hand hygiene technique  - Identify and instruct in appropriate isolation precautions for identified infection/condition  Outcome: Progressing     Problem: SAFETY ADULT  Goal: Patient will remain free of falls  Description: INTERVENTIONS:  - Educate patient/family on patient safety including physical limitations  - Instruct patient to call for assistance with activity   - Consult OT/PT to assist with strengthening/mobility   - Keep Call bell within reach  - Keep bed low and locked with side rails adjusted as appropriate  - Keep care items and personal belongings within reach  - Initiate and maintain comfort rounds  - Make Fall Risk Sign visible to staff  - Offer Toileting every 2 Hours, in advance of need  - Initiate/Maintain bed alarm  - Obtain necessary fall risk management equipment: non skid footwear  - Apply yellow socks and bracelet for high fall risk patients  - Consider moving patient to room near nurses station  Outcome: Progressing     Problem: RESPIRATORY - ADULT  Goal: Achieves optimal ventilation and oxygenation  Description: INTERVENTIONS:  - Assess for changes in respiratory status  - Assess for changes in mentation and behavior  - Position to facilitate oxygenation and minimize respiratory effort  - Oxygen administered by appropriate delivery if ordered  - Initiate smoking cessation education as indicated  - Encourage broncho-pulmonary  hygiene including cough, deep breathe, Incentive Spirometry  - Assess the need for suctioning and aspirate as needed  - Assess and instruct to report SOB or any respiratory difficulty  - Respiratory Therapy support as indicated  Outcome: Progressing     Problem: SKIN/TISSUE INTEGRITY - ADULT  Goal: Skin Integrity remains intact(Skin Breakdown Prevention)  Description: Assess:  -Perform Flavio assessment every shift   -Clean and moisturize skin every day   -Inspect skin when repositioning, toileting, and assisting with ADLS  -Assess under medical devices such as ronny  every hour   -Assess extremities for adequate circulation and sensation     Bed Management:  -Have minimal linens on bed & keep smooth, unwrinkled  -Change linens as needed when moist or perspiring  -Avoid sitting or lying in one position for more than 2 hours while in bed  -Keep HOB at 45 degrees     Toileting:  -Offer bedside commode  -Assess for incontinence every hour  -Use incontinent care products after each incontinent episode such as moisture barrier cream     Activity:  -Mobilize patient 3 times a day  -Encourage activity and walks on unit  -Encourage or provide ROM exercises   -Turn and reposition patient every 2 Hours  -Use appropriate equipment to lift or move patient in bed  -Instruct/ Assist with weight shifting every hour when out of bed in chair  -Consider limitation of chair time 2 hour intervals    Skin Care:  -Avoid use of baby powder, tape, friction and shearing, hot water or constrictive clothing  -Relieve pressure over bony prominences using allevyn   -Do not massage red bony areas    Next Steps:  -Teach patient strategies to minimize risks such as weight shifting    -Consider consults to  interdisciplinary teams such as PT  Outcome: Progressing  Goal: Incision(s), wounds(s) or drain site(s) healing without S/S of infection  Description: INTERVENTIONS  - Assess and document dressing, incision, wound bed, drain sites and  surrounding tissue  - Provide patient and family education  - Perform skin care/dressing changes  Outcome: Progressing  Goal: Pressure injury heals and does not worsen  Description: Interventions:  - Implement low air loss mattress or specialty surface (Criteria met)  - Apply silicone foam dressing  - Instruct/assist with weight shifting every 60 minutes when in chair   - Limit chair time to 2 hour intervals  - Use special pressure reducing interventions  when in chair   - Apply fecal or urinary incontinence containment device   - Perform passive or active ROM  - Turn and reposition patient & offload bony prominences every 2 hours   - Utilize friction reducing device or surface for transfers   - Consider consults to  interdisciplinary teams  - Use incontinent care products after each incontinent episode   - Consider nutrition services referral as needed  Outcome: Progressing     Problem: NEUROSENSORY - ADULT  Goal: Achieves stable or improved neurological status  Description: INTERVENTIONS  - Monitor and report changes in neurological status  - Monitor vital signs such as temperature, blood pressure, glucose, and any other labs ordered   - Initiate measures to prevent increased intracranial pressure  - Monitor for seizure activity and implement precautions if appropriate      Outcome: Progressing  Goal: Achieves maximal functionality and self care  Description: INTERVENTIONS  - Monitor swallowing and airway patency with patient fatigue and changes in neurological status  - Encourage and assist patient to increase activity and self care.   - Encourage visually impaired, hearing impaired and aphasic patients to use assistive/communication devices  Outcome: Progressing     Problem: CARDIOVASCULAR - ADULT  Goal: Maintains optimal cardiac output and hemodynamic stability  Description: INTERVENTIONS:  - Monitor I/O, vital signs and rhythm  - Monitor for S/S and trends of decreased cardiac output  - Administer and titrate  ordered vasoactive medications to optimize hemodynamic stability  - Assess quality of pulses, skin color and temperature  - Assess for signs of decreased coronary artery perfusion  - Instruct patient to report change in severity of symptoms  Outcome: Progressing  Goal: Absence of cardiac dysrhythmias or at baseline rhythm  Description: INTERVENTIONS:  - Continuous cardiac monitoring, vital signs, obtain 12 lead EKG if ordered  - Administer antiarrhythmic and heart rate control medications as ordered  - Monitor electrolytes and administer replacement therapy as ordered  Outcome: Progressing     Problem: GASTROINTESTINAL - ADULT  Goal: Minimal or absence of nausea and/or vomiting  Description: INTERVENTIONS:  - Administer IV fluids if ordered to ensure adequate hydration  - Maintain NPO status until nausea and vomiting are resolved  - Nasogastric tube if ordered  - Administer ordered antiemetic medications as needed  - Provide nonpharmacologic comfort measures as appropriate  - Advance diet as tolerated, if ordered  - Consider nutrition services referral to assist patient with adequate nutrition and appropriate food choices  Outcome: Progressing  Goal: Maintains or returns to baseline bowel function  Description: INTERVENTIONS:  - Assess bowel function  - Encourage oral fluids to ensure adequate hydration  - Administer IV fluids if ordered to ensure adequate hydration  - Administer ordered medications as needed  - Encourage mobilization and activity  - Consider nutritional services referral to assist patient with adequate nutrition and appropriate food choices  Outcome: Progressing  Goal: Maintains adequate nutritional intake  Description: INTERVENTIONS:  - Monitor percentage of each meal consumed  - Identify factors contributing to decreased intake, treat as appropriate  - Assist with meals as needed  - Monitor I&O, weight, and lab values if indicated  - Obtain nutrition services referral as needed  Outcome:  Progressing  Goal: Establish and maintain optimal ostomy function  Description: INTERVENTIONS:  - Assess bowel function  - Encourage oral fluids to ensure adequate hydration  - Administer IV fluids if ordered to ensure adequate hydration   - Administer ordered medications as needed  - Encourage mobilization and activity  - Nutrition services referral to assist patient with appropriate food choices  - Assess stoma site  - Consider wound care consult   Outcome: Progressing  Goal: Oral mucous membranes remain intact  Description: INTERVENTIONS  - Assess oral mucosa and hygiene practices  - Implement preventative oral hygiene regimen  - Implement oral medicated treatments as ordered  - Initiate Nutrition services referral as needed  Outcome: Progressing     Problem: GENITOURINARY - ADULT  Goal: Maintains or returns to baseline urinary function  Description: INTERVENTIONS:  - Assess urinary function  - Encourage oral fluids to ensure adequate hydration if ordered  - Administer IV fluids as ordered to ensure adequate hydration  - Administer ordered medications as needed  - Offer frequent toileting  - Follow urinary retention protocol if ordered  Outcome: Progressing  Goal: Urinary catheter remains patent  Description: INTERVENTIONS:  - Assess patency of urinary catheter  - If patient has a chronic marie, consider changing catheter if non-functioning  - Follow guidelines for intermittent irrigation of non-functioning urinary catheter  Outcome: Progressing     Problem: METABOLIC, FLUID AND ELECTROLYTES - ADULT  Goal: Electrolytes maintained within normal limits  Description: INTERVENTIONS:  - Monitor labs and assess patient for signs and symptoms of electrolyte imbalances  - Administer electrolyte replacement as ordered  - Monitor response to electrolyte replacements, including repeat lab results as appropriate  - Instruct patient on fluid and nutrition as appropriate  Outcome: Progressing  Goal: Fluid balance  maintained  Description: INTERVENTIONS:  - Monitor labs   - Monitor I/O and WT  - Instruct patient on fluid and nutrition as appropriate  - Assess for signs & symptoms of volume excess or deficit  Outcome: Progressing  Goal: Glucose maintained within target range  Description: INTERVENTIONS:  - Monitor Blood Glucose as ordered  - Assess for signs and symptoms of hyperglycemia and hypoglycemia  - Administer ordered medications to maintain glucose within target range  - Assess nutritional intake and initiate nutrition service referral as needed  Outcome: Progressing     Problem: HEMATOLOGIC - ADULT  Goal: Maintains hematologic stability  Description: INTERVENTIONS  - Assess for signs and symptoms of bleeding or hemorrhage  - Monitor labs  - Administer supportive blood products/factors as ordered and appropriate  Outcome: Progressing     Problem: MUSCULOSKELETAL - ADULT  Goal: Maintain or return mobility to safest level of function  Description: INTERVENTIONS:  - Assess patient's ability to carry out ADLs; assess patient's baseline for ADL function and identify physical deficits which impact ability to perform ADLs (bathing, care of mouth/teeth, toileting, grooming, dressing, etc.)  - Assess/evaluate cause of self-care deficits   - Assess range of motion  - Assess patient's mobility  - Assess patient's need for assistive devices and provide as appropriate  - Encourage maximum independence but intervene and supervise when necessary  - Involve family in performance of ADLs  - Assess for home care needs following discharge   - Consider OT consult to assist with ADL evaluation and planning for discharge  - Provide patient education as appropriate  Outcome: Progressing     Problem: COPING  Goal: Pt/Family able to verbalize concerns and demonstrate effective coping strategies  Description: INTERVENTIONS:  - Assist patient/family to identify coping skills, available support systems and cultural and spiritual values  - Provide  emotional support, including active listening and acknowledgement of concerns of patient and caregivers  - Reduce environmental stimuli, as able  - Provide patient education  - Assess for spiritual pain/suffering and initiate spiritual care, including notification of Pastoral Care or nat[place] based community as needed  - Assess effectiveness of coping strategies  Outcome: Progressing  Goal: Will report anxiety at manageable levels  Description: INTERVENTIONS:  - Administer medication as ordered  - Teach and encourage coping skills  - Provide emotional support  - Assess patient/family for anxiety and ability to cope  Outcome: Progressing     Problem: BEHAVIOR  Goal: Pt/Family maintain appropriate behavior and adhere to behavioral management agreement, if implemented  Description: INTERVENTIONS:  - Assess the family dynamic   - Encourage verbalization of thoughts and concerns in a socially appropriate manner  - Assess patient/family's coping skills and non-compliant behavior (including use of illegal substances).  - Utilize positive, consistent limit setting strategies supporting safety of patient, staff and others  - Initiate consult with Case Management, Spiritual Care or other ancillary services as appropriate  - If a patient's/visitor's behavior jeopardizes the safety of the patient, staff, or others, refer to organization procedure.   - Notify Security of behavior or suspected illegal substances which indicate the need for search of the patient and/or belongings  - Encourage participation in the decision making process about a behavioral management agreement; implement if patient meets criteria  Outcome: Progressing     Problem: Potential for Falls  Goal: Patient will remain free of falls  Description: INTERVENTIONS:  - Educate patient/family on patient safety including physical limitations  - Instruct patient to call for assistance with activity   - Consult OT/PT to assist with strengthening/mobility   - Keep Call  bell within reach  - Keep bed low and locked with side rails adjusted as appropriate  - Keep care items and personal belongings within reach  - Initiate and maintain comfort rounds  - Make Fall Risk Sign visible to staff  - Offer Toileting every 2 Hours, in advance of need  - Initiate/Maintain bed alarm  - Obtain necessary fall risk management equipment: non skid footwear  - Apply yellow socks and bracelet for high fall risk patients  - Consider moving patient to room near nurses station  Outcome: Progressing     Problem: MOBILITY - ADULT  Goal: Maintain or return to baseline ADL function  Description: INTERVENTIONS:  -  Assess patient's ability to carry out ADLs; assess patient's baseline for ADL function and identify physical deficits which impact ability to perform ADLs (bathing, care of mouth/teeth, toileting, grooming, dressing, etc.)  - Assess/evaluate cause of self-care deficits   - Assess range of motion  - Assess patient's mobility; develop plan if impaired  - Assess patient's need for assistive devices and provide as appropriate  - Encourage maximum independence but intervene and supervise when necessary  - Involve family in performance of ADLs  - Assess for home care needs following discharge   - Consider OT consult to assist with ADL evaluation and planning for discharge  - Provide patient education as appropriate  Outcome: Progressing  Goal: Maintains/Returns to pre admission functional level  Description: INTERVENTIONS:  - Perform AM-PAC 6 Click Basic Mobility/ Daily Activity assessment daily.  - Set and communicate daily mobility goal to care team and patient/family/caregiver.   - Collaborate with rehabilitation services on mobility goals if consulted  - Perform Range of Motion 3 times a day.  - Reposition patient every 2 hours.  - Dangle patient 3 times a day  - Stand patient 3 times a day  - Ambulate patient 3 times a day  - Out of bed to chair 3 times a day   - Out of bed for meals 3 times a day  -  Out of bed for toileting  - Record patient progress and toleration of activity level   Outcome: Progressing

## 2024-03-28 NOTE — PLAN OF CARE
Problem: Prexisting or High Potential for Compromised Skin Integrity  Goal: Skin integrity is maintained or improved  Description: INTERVENTIONS:  - Identify patients at risk for skin breakdown  - Assess and monitor skin integrity  - Assess and monitor nutrition and hydration status  - Monitor labs   - Assess for incontinence   - Turn and reposition patient  - Assist with mobility/ambulation  - Relieve pressure over bony prominences  - Avoid friction and shearing  - Provide appropriate hygiene as needed including keeping skin clean and dry  - Evaluate need for skin moisturizer/barrier cream  - Collaborate with interdisciplinary team   - Patient/family teaching  - Consider wound care consult   Outcome: Progressing     Problem: Nutrition/Hydration-ADULT  Goal: Nutrient/Hydration intake appropriate for improving, restoring or maintaining nutritional needs  Description: Monitor and assess patient's nutrition/hydration status for malnutrition. Collaborate with interdisciplinary team and initiate plan and interventions as ordered.  Monitor patient's weight and dietary intake as ordered or per policy. Utilize nutrition screening tool and intervene as necessary. Determine patient's food preferences and provide high-protein, high-caloric foods as appropriate.     INTERVENTIONS:  - Monitor oral intake, urinary output, labs, and treatment plans  - Assess nutrition and hydration status and recommend course of action  - Evaluate amount of meals eaten  - Assist patient with eating if necessary   - Allow adequate time for meals  - Recommend/ encourage appropriate diets, oral nutritional supplements, and vitamin/mineral supplements  - Order, calculate, and assess calorie counts as needed  - Recommend, monitor, and adjust tube feedings and TPN/PPN based on assessed needs  - Assess need for intravenous fluids  - Provide specific nutrition/hydration education as appropriate  - Include patient/family/caregiver in decisions related to  nutrition  Outcome: Progressing     Problem: INFECTION - ADULT  Goal: Absence or prevention of progression during hospitalization  Description: INTERVENTIONS:  - Assess and monitor for signs and symptoms of infection  - Monitor lab/diagnostic results  - Monitor all insertion sites, i.e. indwelling lines, tubes, and drains  - Monitor endotracheal if appropriate and nasal secretions for changes in amount and color  - [place] appropriate cooling/warming therapies per order  - Administer medications as ordered  - Instruct and encourage patient and family to use good hand hygiene technique  - Identify and instruct in appropriate isolation precautions for identified infection/condition  Outcome: Progressing     Problem: SAFETY ADULT  Goal: Patient will remain free of falls  Description: INTERVENTIONS:  - Educate patient/family on patient safety including physical limitations  - Instruct patient to call for assistance with activity   - Consult OT/PT to assist with strengthening/mobility   - Keep Call bell within reach  - Keep bed low and locked with side rails adjusted as appropriate  - Keep care items and personal belongings within reach  - Initiate and maintain comfort rounds  - Make Fall Risk Sign visible to staff  - Offer Toileting every 2 Hours, in advance of need  - Initiate/Maintain bed alarm  - Obtain necessary fall risk management equipment: non skid footwear  - Apply yellow socks and bracelet for high fall risk patients  - Consider moving patient to room near nurses station  Outcome: Progressing     Problem: RESPIRATORY - ADULT  Goal: Achieves optimal ventilation and oxygenation  Description: INTERVENTIONS:  - Assess for changes in respiratory status  - Assess for changes in mentation and behavior  - Position to facilitate oxygenation and minimize respiratory effort  - Oxygen administered by appropriate delivery if ordered  - Initiate smoking cessation education as indicated  - Encourage broncho-pulmonary  hygiene including cough, deep breathe, Incentive Spirometry  - Assess the need for suctioning and aspirate as needed  - Assess and instruct to report SOB or any respiratory difficulty  - Respiratory Therapy support as indicated  Outcome: Progressing     Problem: SKIN/TISSUE INTEGRITY - ADULT  Goal: Skin Integrity remains intact(Skin Breakdown Prevention)  Description: Assess:  -Perform Flavio assessment every shift   -Clean and moisturize skin every day   -Inspect skin when repositioning, toileting, and assisting with ADLS  -Assess under medical devices such as ronny  every hour   -Assess extremities for adequate circulation and sensation     Bed Management:  -Have minimal linens on bed & keep smooth, unwrinkled  -Change linens as needed when moist or perspiring  -Avoid sitting or lying in one position for more than 2 hours while in bed  -Keep HOB at 45 degrees     Toileting:  -Offer bedside commode  -Assess for incontinence every hour  -Use incontinent care products after each incontinent episode such as moisture barrier cream     Activity:  -Mobilize patient 3 times a day  -Encourage activity and walks on unit  -Encourage or provide ROM exercises   -Turn and reposition patient every 2 Hours  -Use appropriate equipment to lift or move patient in bed  -Instruct/ Assist with weight shifting every hour when out of bed in chair  -Consider limitation of chair time 2 hour intervals    Skin Care:  -Avoid use of baby powder, tape, friction and shearing, hot water or constrictive clothing  -Relieve pressure over bony prominences using allevyn   -Do not massage red bony areas    Next Steps:  -Teach patient strategies to minimize risks such as weight shifting    -Consider consults to  interdisciplinary teams such as PT  Outcome: Progressing  Goal: Incision(s), wounds(s) or drain site(s) healing without S/S of infection  Description: INTERVENTIONS  - Assess and document dressing, incision, wound bed, drain sites and  surrounding tissue  - Provide patient and family education  - Perform skin care/dressing changes   Outcome: Progressing  Goal: Pressure injury heals and does not worsen  Description: Interventions:  - Implement low air loss mattress or specialty surface (Criteria met)  - Apply silicone foam dressing  - Instruct/assist with weight shifting every 90 minutes when in chair   - Limit chair time to 2 hour intervals  - Use special pressure reducing interventions  when in chair   - Apply fecal or urinary incontinence containment device   - Perform passive or active ROM   - Turn and reposition patient & offload bony prominences every 2 hours   - Utilize friction reducing device or surface for transfers   - Consider consults to  interdisciplinary teams   - Use incontinent care products after each incontinent episode  - Consider nutrition services referral as needed  Outcome: Progressing     Problem: NEUROSENSORY - ADULT  Goal: Achieves stable or improved neurological status  Description: INTERVENTIONS  - Monitor and report changes in neurological status  - Monitor vital signs such as temperature, blood pressure, glucose, and any other labs ordered   - Initiate measures to prevent increased intracranial pressure  - Monitor for seizure activity and implement precautions if appropriate      Outcome: Progressing  Goal: Achieves maximal functionality and self care  Description: INTERVENTIONS  - Monitor swallowing and airway patency with patient fatigue and changes in neurological status  - Encourage and assist patient to increase activity and self care.   - Encourage visually impaired, hearing impaired and aphasic patients to use assistive/communication devices  Outcome: Progressing     Problem: CARDIOVASCULAR - ADULT  Goal: Maintains optimal cardiac output and hemodynamic stability  Description: INTERVENTIONS:  - Monitor I/O, vital signs and rhythm  - Monitor for S/S and trends of decreased cardiac output  - Administer and  titrate ordered vasoactive medications to optimize hemodynamic stability  - Assess quality of pulses, skin color and temperature  - Assess for signs of decreased coronary artery perfusion  - Instruct patient to report change in severity of symptoms  Outcome: Progressing  Goal: Absence of cardiac dysrhythmias or at baseline rhythm  Description: INTERVENTIONS:  - Continuous cardiac monitoring, vital signs, obtain 12 lead EKG if ordered  - Administer antiarrhythmic and heart rate control medications as ordered  - Monitor electrolytes and administer replacement therapy as ordered  Outcome: Progressing     Problem: GASTROINTESTINAL - ADULT  Goal: Minimal or absence of nausea and/or vomiting  Description: INTERVENTIONS:  - Administer IV fluids if ordered to ensure adequate hydration  - Maintain NPO status until nausea and vomiting are resolved  - Nasogastric tube if ordered  - Administer ordered antiemetic medications as needed  - Provide nonpharmacologic comfort measures as appropriate  - Advance diet as tolerated, if ordered  - Consider nutrition services referral to assist patient with adequate nutrition and appropriate food choices  Outcome: Progressing  Goal: Maintains or returns to baseline bowel function  Description: INTERVENTIONS:  - Assess bowel function  - Encourage oral fluids to ensure adequate hydration  - Administer IV fluids if ordered to ensure adequate hydration  - Administer ordered medications as needed  - Encourage mobilization and activity  - Consider nutritional services referral to assist patient with adequate nutrition and appropriate food choices  Outcome: Progressing  Goal: Maintains adequate nutritional intake  Description: INTERVENTIONS:  - Monitor percentage of each meal consumed  - Identify factors contributing to decreased intake, treat as appropriate  - Assist with meals as needed  - Monitor I&O, weight, and lab values if indicated  - Obtain nutrition services referral as needed  Outcome:  Progressing  Goal: Establish and maintain optimal ostomy function  Description: INTERVENTIONS:  - Assess bowel function  - Encourage oral fluids to ensure adequate hydration  - Administer IV fluids if ordered to ensure adequate hydration   - Administer ordered medications as needed  - Encourage mobilization and activity  - Nutrition services referral to assist patient with appropriate food choices  - Assess stoma site  - Consider wound care consult   Outcome: Progressing  Goal: Oral mucous membranes remain intact  Description: INTERVENTIONS  - Assess oral mucosa and hygiene practices  - Implement preventative oral hygiene regimen  - Implement oral medicated treatments as ordered  - Initiate Nutrition services referral as needed  Outcome: Progressing     Problem: GENITOURINARY - ADULT  Goal: Maintains or returns to baseline urinary function  Description: INTERVENTIONS:  - Assess urinary function  - Encourage oral fluids to ensure adequate hydration if ordered  - Administer IV fluids as ordered to ensure adequate hydration  - Administer ordered medications as needed  - Offer frequent toileting  - Follow urinary retention protocol if ordered  Outcome: Progressing  Goal: Urinary catheter remains patent  Description: INTERVENTIONS:  - Assess patency of urinary catheter  - If patient has a chronic marie, consider changing catheter if non-functioning  - Follow guidelines for intermittent irrigation of non-functioning urinary catheter  Outcome: Progressing     Problem: METABOLIC, FLUID AND ELECTROLYTES - ADULT  Goal: Electrolytes maintained within normal limits  Description: INTERVENTIONS:  - Monitor labs and assess patient for signs and symptoms of electrolyte imbalances  - Administer electrolyte replacement as ordered  - Monitor response to electrolyte replacements, including repeat lab results as appropriate  - Instruct patient on fluid and nutrition as appropriate  Outcome: Progressing  Goal: Fluid balance  maintained  Description: INTERVENTIONS:  - Monitor labs   - Monitor I/O and WT  - Instruct patient on fluid and nutrition as appropriate  - Assess for signs & symptoms of volume excess or deficit  Outcome: Progressing  Goal: Glucose maintained within target range  Description: INTERVENTIONS:  - Monitor Blood Glucose as ordered  - Assess for signs and symptoms of hyperglycemia and hypoglycemia  - Administer ordered medications to maintain glucose within target range  - Assess nutritional intake and initiate nutrition service referral as needed  Outcome: Progressing     Problem: HEMATOLOGIC - ADULT  Goal: Maintains hematologic stability  Description: INTERVENTIONS  - Assess for signs and symptoms of bleeding or hemorrhage  - Monitor labs  - Administer supportive blood products/factors as ordered and appropriate  Outcome: Progressing     Problem: MUSCULOSKELETAL - ADULT  Goal: Maintain or return mobility to safest level of function  Description: INTERVENTIONS:  - Assess patient's ability to carry out ADLs; assess patient's baseline for ADL function and identify physical deficits which impact ability to perform ADLs (bathing, care of mouth/teeth, toileting, grooming, dressing, etc.)  - Assess/evaluate cause of self-care deficits   - Assess range of motion  - Assess patient's mobility  - Assess patient's need for assistive devices and provide as appropriate  - Encourage maximum independence but intervene and supervise when necessary  - Involve family in performance of ADLs  - Assess for home care needs following discharge   - Consider OT consult to assist with ADL evaluation and planning for discharge  - Provide patient education as appropriate  Outcome: Progressing     Problem: COPING  Goal: Pt/Family able to verbalize concerns and demonstrate effective coping strategies  Description: INTERVENTIONS:  - Assist patient/family to identify coping skills, available support systems and cultural and spiritual values  - Provide  emotional support, including active listening and acknowledgement of concerns of patient and caregivers  - Reduce environmental stimuli, as able  - Provide patient education  - Assess for spiritual pain/suffering and initiate spiritual care, including notification of Pastoral Care or nat[place] based community as needed  - Assess effectiveness of coping strategies  Outcome: Progressing  Goal: Will report anxiety at manageable levels  Description: INTERVENTIONS:  - Administer medication as ordered  - Teach and encourage coping skills  - Provide emotional support  - Assess patient/family for anxiety and ability to cope  Outcome: Progressing     Problem: BEHAVIOR  Goal: Pt/Family maintain appropriate behavior and adhere to behavioral management agreement, if implemented  Description: INTERVENTIONS:  - Assess the family dynamic   - Encourage verbalization of thoughts and concerns in a socially appropriate manner  - Assess patient/family's coping skills and non-compliant behavior (including use of illegal substances).  - Utilize positive, consistent limit setting strategies supporting safety of patient, staff and others  - Initiate consult with Case Management, Spiritual Care or other ancillary services as appropriate  - If a patient's/visitor's behavior jeopardizes the safety of the patient, staff, or others, refer to organization procedure.   - Notify Security of behavior or suspected illegal substances which indicate the need for search of the patient and/or belongings  - Encourage participation in the decision making process about a behavioral management agreement; implement if patient meets criteria  Outcome: Progressing     Problem: Potential for Falls  Goal: Patient will remain free of falls  Description: INTERVENTIONS:  - Educate patient/family on patient safety including physical limitations  - Instruct patient to call for assistance with activity   - Consult OT/PT to assist with strengthening/mobility   - Keep Call  bell within reach  - Keep bed low and locked with side rails adjusted as appropriate  - Keep care items and personal belongings within reach  - Initiate and maintain comfort rounds  - Make Fall Risk Sign visible to staff  - Offer Toileting every 2 Hours, in advance of need  - Initiate/Maintain bed alarm  - Obtain necessary fall risk management equipment: non skid footwear  - Apply yellow socks and bracelet for high fall risk patients  - Consider moving patient to room near nurses station  Outcome: Progressing     Problem: MOBILITY - ADULT  Goal: Maintain or return to baseline ADL function  Description: INTERVENTIONS:  -  Assess patient's ability to carry out ADLs; assess patient's baseline for ADL function and identify physical deficits which impact ability to perform ADLs (bathing, care of mouth/teeth, toileting, grooming, dressing, etc.)  - Assess/evaluate cause of self-care deficits   - Assess range of motion  - Assess patient's mobility; develop plan if impaired  - Assess patient's need for assistive devices and provide as appropriate  - Encourage maximum independence but intervene and supervise when necessary  - Involve family in performance of ADLs  - Assess for home care needs following discharge   - Consider OT consult to assist with ADL evaluation and planning for discharge  - Provide patient education as appropriate  Outcome: Progressing  Goal: Maintains/Returns to pre admission functional level  Description: INTERVENTIONS:  - Perform AM-PAC 6 Click Basic Mobility/ Daily Activity assessment daily.  - Set and communicate daily mobility goal to care team and patient/family/caregiver.   - Collaborate with rehabilitation services on mobility goals if consulted  - Perform Range of Motion 3 times a day.  - Reposition patient every 2 hours.  - Dangle patient 3 times a day  - Stand patient 3 times a day  - Ambulate patient 3 times a day  - Out of bed to chair 3 times a day   - Out of bed for meals 3 times a day  -  Out of bed for toileting  - Record patient progress and toleration of activity level   Outcome: Progressing

## 2024-03-28 NOTE — UTILIZATION REVIEW
Continued Stay Review    Date: 3/28/24                          Current Patient Class: inpatient  Current Level of Care: ICU  HPI:59 y.o. female initially admitted on 1/10/24     Assessment/Plan:   Acute hypoxic respiratory failure; s/p tracheostomy 3/12. S/P 14d Remdesivir course to tx COVID-19, completed 3/15. S/P 14d Minocycline course to tx stenotrophomonas PNA, completed 3/27. O2 via trach mask @ 8ltr. Continue IV Steroids. Hyperglycemia 2/2 high dose steroids requiring insulin gtt. Home Lamictal switched to Vimpat 3/27 due to worsening pancytopenia, neuro following, continue seizure precautions. Speech therapy following for tx and rehab including potential neuromuscular electrical stim/NMES/VitalStim, however will need TF in the interim and likely PEG pending abdominal wound healing. Hypernatremia, Na @ 146 yesterday, IVF D5 increased to 75cc/hr.  Urology consulted for retention.    Vital Signs:   Date/Time Temp Pulse Resp BP MAP (mmHg) SpO2 FiO2 (%) Calculated FIO2 (%) - Nasal Cannula O2 Flow Rate (L/min) Nasal Cannula O2 Flow Rate (L/min) O2 Device Patient Position - Orthostatic VS   03/28/24 1300 -- 126 Abnormal  20 141/71 97 99 % -- -- -- -- -- --   03/28/24 1200 -- 131 Abnormal  28 Abnormal  143/76 102 95 % -- -- -- -- -- --   03/28/24 1100 98.7 °F (37.1 °C) 130 Abnormal  23 Abnormal  146/78 104 98 % -- -- -- -- -- Lying   03/28/24 1000 -- 130 Abnormal  23 Abnormal  149/79 108 98 % -- -- -- -- -- --   03/28/24 0900 -- 133 Abnormal  22 141/72 101 97 % -- -- -- -- -- --   03/28/24 0800 -- 130 Abnormal  22 150/82 109 100 % -- -- -- -- Trach mask --   03/28/24 0722 -- -- -- -- -- -- 35 -- 8 L/min -- Trach mask --   03/28/24 0700 -- 129 Abnormal  21 146/83 105 99 % -- -- -- -- -- --   03/28/24 0600 -- 124 Abnormal  18 145/79 107 99 % -- -- -- -- -- --   03/28/24 0500 -- 123 Abnormal  22 138/76 96 95 % -- -- -- -- -- --   03/28/24 0400 98 °F (36.7 °C) 120 Abnormal  20 128/73 94 94 % -- -- -- -- Trach mask --    03/28/24 0300 -- 121 Abnormal  19 128/76 96 94 % -- -- -- -- -- --   03/28/24 0200 -- 121 Abnormal  20 104/59 77 95 % -- -- -- -- -- --   03/28/24 0100 -- 122 Abnormal  20 112/65 83 94 % -- -- -- -- -- --   03/28/24 0000 97.7 °F (36.5 °C) 128 Abnormal  20 106/69 83 96 % 35 -- -- -- Trach mask Lying     Pertinent Labs/Diagnostic Results:     Results from last 7 days   Lab Units 03/28/24  0532 03/27/24  0545 03/26/24  0548 03/25/24  0835 03/25/24  0523   WBC Thousand/uL 1.07* 0.91* 0.90* 1.20* 1.31*   HEMOGLOBIN g/dL 7.8* 7.7* 8.3* 8.8* 8.7*   HEMATOCRIT % 24.6* 24.8* 26.8* 27.8* 27.6*   PLATELETS Thousands/uL 34* 43* 27* 38* 40*   NEUTROS ABS Thousands/µL  --   --   --  0.94*  --    TOTAL NEUT ABS Thousand/uL 0.88*  --   --   --  1.07*   BANDS PCT % 21* 46* 32*  --  30*     Results from last 7 days   Lab Units 03/25/24  0523   RETIC CT ABS  28,400   RETIC CT PCT % 1.01     Results from last 7 days   Lab Units 03/28/24  0532 03/27/24  0545 03/26/24  2046 03/26/24  1438 03/26/24  0548 03/25/24  1234 03/25/24  0523 03/24/24  0437   SODIUM mmol/L 145 146 144 142 152*   < > 156* 150*   POTASSIUM mmol/L 3.1* 4.0 3.8 4.2 3.8   < > 4.3 4.4   CHLORIDE mmol/L 114* 114* 115* 115* 121*   < > 124* 119*   CO2 mmol/L 19* 20* 20* 20* 20*   < > 23 23   ANION GAP mmol/L 12 12 9 7 11   < > 9 8   BUN mg/dL 62* 56* 54* 57* 63*   < > 72* 68*   CREATININE mg/dL 0.83 0.83 0.81 0.80 0.79   < > 0.89 0.99   EGFR ml/min/1.73sq m 77 77 80 81 82   < > 71 63   CALCIUM mg/dL 9.7 9.3 8.9 8.5 9.1   < > 9.4 9.2   MAGNESIUM mg/dL 2.0 2.0  --   --  2.1  --  2.4 2.3   PHOSPHORUS mg/dL 3.9 4.6*  --   --  3.9  --  3.7 3.8    < > = values in this interval not displayed.     Results from last 7 days   Lab Units 03/25/24  0523 03/24/24  0437 03/23/24  0519   AST U/L  --  13 21   ALT U/L  --  41 40   ALK PHOS U/L  --  115* 98   TOTAL PROTEIN g/dL  --  5.0* 4.6*   ALBUMIN g/dL  --  2.5* 2.3*   TOTAL BILIRUBIN mg/dL 0.53 0.43 0.45     Results from last 7 days    Lab Units 03/28/24  1203 03/28/24  1001 03/28/24  0807 03/28/24  0626 03/28/24  0404 03/28/24  0203 03/27/24  2323 03/27/24  1815 03/27/24  1145 03/26/24  2100 03/26/24  1150 03/26/24  0953   POC GLUCOSE mg/dl 216* 195* 184* 199* 293* 293* 321* 273* 244* 186* 223* 216*     Results from last 7 days   Lab Units 03/28/24  0532 03/27/24  0545 03/26/24  2046 03/26/24  1438 03/26/24  0548 03/26/24  0026 03/25/24  1234 03/25/24  0523 03/24/24  0437 03/23/24  0519 03/22/24  0524   GLUCOSE RANDOM mg/dL 201* 263* 179* 202* 179* 116 177* 152* 154* 194* 176*     Results from last 7 days   Lab Units 03/21/24  1512   PH ART  7.374   PCO2 ART mm Hg 32.6*   PO2 ART mm Hg 81.7   HCO3 ART mmol/L 18.6*   BASE EXC ART mmol/L -5.8   O2 CONTENT ART mL/dL 14.4*   O2 HGB, ARTERIAL % 95.1   ABG SOURCE  Line, Arterial     Results from last 7 days   Lab Units 03/27/24  0555 03/22/24  0844 03/22/24  0555   UNIT PRODUCT CODE  C3473P80 [id number]  [id number]  H5914I70  Y0276H44 [id number]   UNIT NUMBER  [id number]  [id number]  [id number]  [id number]   UNITABO  A A  A  A  A A   UNITRH  POS NEG  NEG  NEG  NEG POS   CROSSMATCH   --  Compatible  Compatible  Compatible  Compatible  --    UNIT DISPENSE STATUS  Presumed Trans Return to Inv  Return to Inv  Return to Inv  Presumed Trans Presumed Trans   UNIT PRODUCT VOL mL 300 350  350  350  350 248     Results from last 7 days   Lab Units 03/25/24  0523 03/23/24  0519   CRP mg/L 256.9* 249.3*     Results from last 7 days   Lab Units 03/28/24  0532 03/25/24  0523   TOTAL COUNTED  100 100       Medications:   Scheduled Medications:  chlorhexidine, 15 mL, Mouth/Throat, Q12H SARTHAK  Filgrastim-aafi, 300 mcg, Subcutaneous, Daily  insulin glargine, 10 Units, Subcutaneous, QAM  insulin lispro, 1-6 Units, Subcutaneous, Q6H  insulin lispro, 1-6 Units, Subcutaneous, HS  lacosamide, 100 mg, Oral, Q12H SARTHAK  levalbuterol, 1.25 mg, Nebulization,  TID  methylPREDNISolone sodium succinate, 30 mg, Intravenous, BID  modafinil, 100 mg, Per NG Tube, Daily  nystatin, , Topical, BID  pantoprazole, 40 mg, Intravenous, Q12H SARTHAK  polyethylene glycol, 17 g, Per NG Tube, Daily  sodium chloride, 2 spray, Each Nare, BID  sodium chloride, 4 mL, Nebulization, TID  topiramate, 50 mg, Per PEG Tube, BID  venlafaxine, 12.5 mg, Oral, TID With Meals    Continuous IV Infusions:  dextrose, 75 mL/hr, Intravenous, Continuous    PRN Meds:  acetaminophen, 650 mg, Oral, Q6H PRN  fentaNYL, 25 mcg, Intravenous, Q1H PRN  ondansetron, 4 mg, Intravenous, Q6H PRN  sodium chloride, 1 Application, Nasal, Q1H PRN    Discharge Plan: d    Network Utilization Review Department  ATTENTION: Please call with any questions or concerns to [phone] and carefully listen to the prompts so that you are directed to the right person. All voicemails are confidential.   For Discharge needs, contact Care Management DC Support Team at [phone] opt. [phone]  Send all requests for admission clinical reviews, approved or denied determinations and any other requests to dedicated fax number below belonging to the campus where the patient is receiving treatment. List of dedicated fax numbers for the Facilities:  FACILITY NAME UR FAX NUMBER   ADMISSION DENIALS (Administrative/Medical Necessity) [phone]   DISCHARGE SUPPORT TEAM (NETWORK) [phone]   PARENT CHILD HEALTH (Maternity/NICU/Pediatrics) [phone]   [facility] [phone]   General acute hospital [phone]   [facility] [phone]   [facility] [phone]   [facility] [phone]   [facility] [phone]   [facility] [phone]   [facility] [phone]   [facility] [phone]   [facility]  [facility] [phone]   [facility] [phone]   [facility] [phone]

## 2024-03-28 NOTE — PLAN OF CARE
Problem: Prexisting or High Potential for Compromised Skin Integrity  Goal: Skin integrity is maintained or improved  Description: INTERVENTIONS:  - Identify patients at risk for skin breakdown  - Assess and monitor skin integrity  - Assess and monitor nutrition and hydration status  - Monitor labs   - Assess for incontinence   - Turn and reposition patient  - Assist with mobility/ambulation  - Relieve pressure over bony prominences  - Avoid friction and shearing  - Provide appropriate hygiene as needed including keeping skin clean and dry  - Evaluate need for skin moisturizer/barrier cream  - Collaborate with interdisciplinary team   - Patient/family teaching  - Consider wound care consult   Outcome: Progressing     Problem: INFECTION - ADULT  Goal: Absence or prevention of progression during hospitalization  Description: INTERVENTIONS:  - Assess and monitor for signs and symptoms of infection  - Monitor lab/diagnostic results  - Monitor all insertion sites, i.e. indwelling lines, tubes, and drains  - Monitor endotracheal if appropriate and nasal secretions for changes in amount and color  - [place] appropriate cooling/warming therapies per order  - Administer medications as ordered  - Instruct and encourage patient and family to use good hand hygiene technique  - Identify and instruct in appropriate isolation precautions for identified infection/condition  Outcome: Progressing     Problem: SAFETY ADULT  Goal: Patient will remain free of falls  Description: INTERVENTIONS:  - Educate patient/family on patient safety including physical limitations  - Instruct patient to call for assistance with activity   - Consult OT/PT to assist with strengthening/mobility   - Keep Call bell within reach  - Keep bed low and locked with side rails adjusted as appropriate  - Keep care items and personal belongings within reach  - Initiate and maintain comfort rounds  - Make Fall Risk Sign visible to staff  - Offer Toileting every  2 Hours, in advance of need  - Initiate/Maintain bed alarm  - Obtain necessary fall risk management equipment: non skid footwear  - Apply yellow socks and bracelet for high fall risk patients  - Consider moving patient to room near nurses station  Outcome: Progressing     Problem: RESPIRATORY - ADULT  Goal: Achieves optimal ventilation and oxygenation  Description: INTERVENTIONS:  - Assess for changes in respiratory status  - Assess for changes in mentation and behavior  - Position to facilitate oxygenation and minimize respiratory effort  - Oxygen administered by appropriate delivery if ordered  - Initiate smoking cessation education as indicated  - Encourage broncho-pulmonary hygiene including cough, deep breathe, Incentive Spirometry  - Assess the need for suctioning and aspirate as needed  - Assess and instruct to report SOB or any respiratory difficulty  - Respiratory Therapy support as indicated  Outcome: Progressing     Problem: Nutrition/Hydration-ADULT  Goal: Nutrient/Hydration intake appropriate for improving, restoring or maintaining nutritional needs  Description: Monitor and assess patient's nutrition/hydration status for malnutrition. Collaborate with interdisciplinary team and initiate plan and interventions as ordered.  Monitor patient's weight and dietary intake as ordered or per policy. Utilize nutrition screening tool and intervene as necessary. Determine patient's food preferences and provide high-protein, high-caloric foods as appropriate.     INTERVENTIONS:  - Monitor oral intake, urinary output, labs, and treatment plans  - Assess nutrition and hydration status and recommend course of action  - Evaluate amount of meals eaten  - Assist patient with eating if necessary   - Allow adequate time for meals  - Recommend/ encourage appropriate diets, oral nutritional supplements, and vitamin/mineral supplements  - Order, calculate, and assess calorie counts as needed  - Recommend, monitor, and adjust  tube feedings and TPN/PPN based on assessed needs  - Assess need for intravenous fluids  - Provide specific nutrition/hydration education as appropriate  - Include patient/family/caregiver in decisions related to nutrition  Outcome: Progressing

## 2024-03-28 NOTE — PROGRESS NOTES
Critical Care Attending Note; Bharat Márquez   Note Date: 24  Note Time: 10:49 AM    Patient: Carla Hunt  Age, : 59 y.o., 1965 MRN: [id number] Code Status: Level 1 - Full Code Patient Location: MICU 12/MICU 12   Hospital LOS:78 days  ]   Patient seen and examined, medical record reviewed, discussed with house staff and nursing staff.     HPI   CC: Respiratory Failure   59F with a PMH of B - Cell Lymphoma(Bendamustine-Rituximab x 6 cycles [21 - 1/3/22], maintenance Rituximab), Epilepsy(Temporal Lobe resection- Complex Migraines) originally admitted for AMS found to have COVID, she has had a very complicated course requiring multiple intubations, volvulus(Ex Lap - ileostomy - wound dehiscence), hemorrhagic shock,  PNAs.    Key Recent Events   : [place] - AMS, COVID - Steroid protocol  1/10: Transfer [facility] Video EEG  : LP   1/15: LP  : Bronch  : Pulse Dose steroids - 3 days   Intubated - Epistaxis  2/15: IVIG Completed  : Bronch - COVID + Antigen/PCR  : Volvulus - ex lap, ileocecectomy, end ileostomy and mucus fistula creation   :  Bronch   : Bronch  3/1: Extubated  3/11: Re-intubated, Bronch - Stenotrophomonas/Pseudo  3/12: Bronch  3/12: Trach  3/13: BAL - Stenotrophomonas   3/15: 14d course Paxlovid/remdesivir  3/17: Bronch  3/20: Hemorrhagic shock - Ostomy - 2PRBCs - OR -   3/21: Hemorrhage Ostomy/WV- 1 PRBCs - OR -  3/25: No acute events overnight      Main ICU Plans:       #Neuro  Anxiety/Alertness  - Venlanfaxine  - Restart Modafinil    Seizure disorder  - Vimpat, Topimax  - Neurology Consult     #Pulm  Hypoxic Respiratory Failure - COVID, Pneumonitis, Rituximab - Improved but failed weaning steroids. Extended steroid course and has been on steroids for > 1 month. Plan for slower wean from steroids to monitor for improvement   - TCT daily  - Wean  Solumedro 30 q12hr - 3/30 wean to 40mg qday     #Renal  Hypernatremia   - Increase FWF  - D5W  restart    Urinary Retention -   - Rotiz - Failed voiding trial  - Discontinue Ipratropium     #GI  Volvulus -   - General surgery following  - Ostomy/Wound Care    Severe Protein Calorie malnutrition - Patient unlikely to maintain enough oral intake to keep up with calorie demand  - Recommend PEG once stable - will discuss with surgery, once neutrophil count improves likely can move forward  - Swallow eval   - Aggressive PT/OT      #ID   Stenotrophomonas PNA  - ID Consulted - Minocycline - Completed 14 day course  - Restart PCP PPX     COVID PNA - Extended steroid/antiviral course requiring pulse dose steroids with improvement in respiratory function. Treatment difficult due to underlying immunosuppression  - Steroids as above  - Repeat covid test      #MSK  Steroid/Critical Care Neuropathy  - PT/OT     #Heme  Anemia - concerns for PUD(no melena, BRPR, hematemesis), maybe due to BM suppression  - GI Consulted - holding off on EGD     Thrombocytopenia - Likely consumptive/BM suppression - possibly due to anti-epileptics, doxycycline  - Heme consulted - Filgastrum  - PLT >30    #Endo  DM  - Lantus 10 units sq - restart  - ISS    #DVT/GI ppx  Hold SQH     #Lines/Tubes/Drains:   Invasive Devices       Peripheral Intravenous Line  Duration             Long-Dwell Peripheral IV (Midline) 03/08/24 Left Cephalic Vein 19 days    Peripheral IV 03/26/24 Right;Ventral (anterior) Forearm 2 days              Drain  Duration             Ileostomy RUQ 36 days    NG/OG/Enteral Tube Nasogastric 14 Fr Right nare 22 days    Urethral Catheter Double-lumen 1 day              Airway  Duration             Surgical Airway Shiley Cuffed 15 days                    #Nutrition:   Diet Enteral/Parenteral; Tube Feeding No Oral Diet; Vital 1.5; Continuous; 45; Prosource Protein Liquid - One Packet; OD; Refugio - One Packet; BID; 300; Water; Every 4 hours        #Code Status:   Level 1 - Full Code    #Dispo:   ICU        Bharat Márquez  "MD  Pulmonary, Critical Care    Critical care time, excluding procedures, teaching, family meetings, and excludes any prior time recorded by the AP/resident, 35 minutes. Upon my evaluation, this patient has a high probability of imminent or life-threatening deterioration due to above problems which required my direct attention, intervention, and personal management.   Impression/Active Problems:    Acute hypoxic respiratory failure ventilator dependent- s/p tracheostomy  Bilateral ground glass opacities- likely persistent PNA and superimposed bacterial infection  Persistent COVID Pna with superimposed bacterial pna- w/ stenotrophomonas  Acute blood loss anemia- from stoma site  Hemorrhagic shock  Severe encephalopathy- possibly due to uremia  Cutaneous ulcer- below the trach site, likely due to pressure.   Stenotrophomonas PNA  Sepsis- 2/2 persistent pneumonia  Volvulus- post hemicolectomy s/p cecal ostomy. Noted stomal retraction  Shock- unclear etiology  Thrombocytopenia  Bcell lymphoma- on rituximab  Steroid induced hyperglycemia  Minimal SAH POA  Seizure disorder  Upper extremity thrombophlebitis  Severe lymphopenia  Hx of migraines- s/p left temporal lobectomy  Hypogammaglobulinemia  Critical illness myopathy      Physical Exam:     Vital Signs:   Weight: 70.2 kg (154 lb 12.2 oz)  IBW: Ideal body weight: 63.9 kg (140 lb 14 oz)  Adjusted ideal body weight: 66.4 kg (146 lb 6.9 oz)  Temp:  [97.7 °F (36.5 °C)-98.4 °F (36.9 °C)] 98 °F (36.7 °C)  HR:  [114-133] 130  Resp:  [17-23] 23  BP: ()/(56-83) 149/79  FiO2 (%):  [35] 35  Physical Exam: unchanged  General: NAD  Neuro: Alert, following commands  Heart: RRR  Lungs: Basilar crackles  Abdomen: Midline - Wound vac - CDI, Ostomy, NT  Extremities: Edema                Ventilator Settings:    FiO2 (%):  [35] 35          Invalid input(s): \"PCO2\", \"O2\"    Radiologic Images Reviewed:    CXR  Mild prominent interstitial markings. No pneumothorax or pleural effusion.   "   Normal cardiomediastinal silhouette.     Bones are unremarkable for age.     Normal upper abdomen.     IMPRESSION:     Mild congestive changes.    CT C/A/P  IMPRESSION:  1. Persistent bilateral groundglass and nodular consolidation with peripheral opacification at the right greater than left lung bases. Findings remain concerning for multi lobar infiltrates with possible superimposed COVID-19 opacities.  2. Status post ileocolectomy with a right lower quadrant ileostomy again exhibiting a patent stoma. Persistent inflammatory change and subcutaneous emphysema after recent intervention. No drainable collection.  3. Bilateral renal calcifications again suggestive of medullary sponge kidney with persistent mild right renal fullness but no evidence of distal obstructive pathology.        Input / Output:     Intake/Output Summary (Last 24 hours) at 3/28/2024 1049  Last data filed at 3/28/2024 0853  Gross per 24 hour   Intake 3539.9 ml   Output 2285 ml   Net 1254.9 ml            Infusions:  dextrose, 75 mL/hr, Last Rate: 75 mL/hr (03/28/24 0817)  insulin regular (HumuLIN R,NovoLIN R) 1 Units/mL in sodium chloride 0.9 % 100 mL infusion, 0.3-21 Units/hr, Last Rate: 6 Units/hr (03/28/24 1040)        Scheduled Medications:  Current Facility-Administered Medications   Medication Dose Route Frequency Provider Last Rate    acetaminophen  650 mg Oral Q6H PRN Moises Bowden MD      chlorhexidine  15 mL Mouth/Throat Q12H SARTHAK Bowden MD      dextrose  75 mL/hr Intravenous Continuous Ammar Alam, DO 75 mL/hr (03/28/24 0817)    fentaNYL  25 mcg Intravenous Q1H PRN Ammar Alam, DO      Filgrastim-aafi  300 mcg Subcutaneous Daily Elena Ragab, DO      insulin regular (HumuLIN R,NovoLIN R) 1 Units/mL in sodium chloride 0.9 % 100 mL infusion  0.3-21 Units/hr Intravenous Titrated Mahamed ROVERTO Cardenas, DO 6 Units/hr (03/28/24 1040)    ipratropium  0.5 mg Nebulization TID Moises Bowden MD      lacosamide  100 mg Oral Q12H SARTHAK Preciado PA-C   "    levalbuterol  1.25 mg Nebulization TID Moises Bowden MD      methylPREDNISolone sodium succinate  30 mg Intravenous BID Joe Lazcano DO      modafinil  100 mg Per NG Tube Daily Joe Lazcano DO      nystatin   Topical BID Moises Bowden MD      ondansetron  4 mg Intravenous Q6H PRN Moises Bowden MD      pantoprazole  40 mg Intravenous Q12H SARTHAK Moises Bowden MD      polyethylene glycol  17 g Per NG Tube Daily Moises Bowden MD      sodium chloride  1 Application Nasal Q1H PRN Moises Bowden MD      sodium chloride  2 spray Each Nare BID Emilie Soyeon Kim, MD      sodium chloride  4 mL Nebulization TID Moises Bowden MD      topiramate  50 mg Per PEG Tube BID Moises Bowden MD      venlafaxine  12.5 mg Oral TID With Meals Moises Bowden MD         PRN Medications:    acetaminophen    fentaNYL    ondansetron    sodium chloride    Labs Reviewed:  Results from last 7 days   Lab Units 03/28/24  0532 03/27/24  0545 03/26/24  0548   WBC Thousand/uL 1.07* 0.91* 0.90*   HEMOGLOBIN g/dL 7.8* 7.7* 8.3*   HEMATOCRIT % 24.6* 24.8* 26.8*   PLATELETS Thousands/uL 34* 43* 27*      Results from last 7 days   Lab Units 03/28/24  0532 03/27/24  0545 03/26/24  2046 03/26/24  1438 03/26/24  0548   SODIUM mmol/L 145 146 144   < > 152*   CO2 mmol/L 19* 20* 20*   < > 20*   BUN mg/dL 62* 56* 54*   < > 63*   CALCIUM mg/dL 9.7 9.3 8.9   < > 9.1   MAGNESIUM mg/dL 2.0 2.0  --   --  2.1   PHOSPHORUS mg/dL 3.9 4.6*  --   --  3.9    < > = values in this interval not displayed.         Invalid input(s): \"ASTSGOT\", \"ALTSGPT\"LABRCNTIP@ ,alkphos:3,tbilirubin:3,dbilirubin:3)@  Results from last 7 days   Lab Units 03/21/24  1332   INR  1.49*           Invalid input(s): \"TROPT\", \"PBNP\"             I have personally seen and examined the patient on (03/28/24 between [id number]). I discussed the patient with the AP/resident including, but not limited to, verifying findings; reviewing labs and x-rays; discussing with consultants; developing the plan of care with the bedside nurse; and " discussing treatment plan with patient or surrogate.  I have reviewed the note and assessment performed by the AP/resident and agree with the AP/resident’s documented findings and plan of care with the above additions/exceptions. Please see my comments for details and adjustments.

## 2024-03-28 NOTE — PROGRESS NOTES
[facility]  Progress Note: Critical Care  Name: Carla Hunt 59 y.o. female I MRN: [id number]  Unit/Bed#: MICU 12 I Date of Admission: 1/10/2024   Date of Service: 3/28/2024 I Hospital Day: 78    Assessment/Plan   Acute hypoxic respiratory failure; s/p tracheostomy 3/12  Bilateral ground glass opacities, persistent, improving  Pancytopenia- multifactorial including hx b-cell lymphoma, persistent inflammation  Acute on chronic anemia- multifactorial-intermittent blood loss from ostomy site, chronic inflammation, iatrogenic, marrow dysfunction in setting of persistent inflammation   Lymphopenia with bandemia, in setting of high dose corticosteroids  Severe Metabolic encephalopathy, multifactorial including ?uremia, improving  Uremia, ?catabolic from steroids, improving  Oropharyngeal dysphagia, NPO on TF, in setting of multiple prolonged intubations  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; complicated by poor wound healing  Wound dehiscence 2/2 poor wound healing s/p wound vac 3/13  B-Cell lymphoma, s/p chemotheraphy 2022, Rituxan maintenance therapy on hold 2/2 persistent covid-19  Seizure disorder; on vimpat  Steroid induced hyperglycemia;on insulin gtt  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  Hx of complex migraines s/p L temporal lobectomy 2007 complicated by #18  Hx of complex seizures in setting of #17, on AEDs  COVID-19 infection POA; resolved; s/p multiple courses of antivirals 2/2 persistent infection,  most recent Remdesivir course completed 3/15, superimposed by recurrent PNA s/p multiple courses Abx  Stenotrophomonas PNA; recurrent, previously on Bactrim, switched to minocycline 2/2 ELIESER now resolved, completed 14d course 3/27  Minimal SAH POA, no interventions required, resolved on repeat CTH     Neuro:    Diagnosis: Alertness  -Continue modafinil, delerium precautions  Diagnosis: Analgesia  - PRN Fentanyl 25mcg/hr; on  holiday for frequent neurochecks     Diagnosis: Metabolic encephalopathy; POA, improving  -Waxing and waning neuro exam since admission  -Most recent repeat CTH 3/13 negative for acute intracranial findings.  Has had extensive neurological workup including MRI/CT neuroimaging, LP with unremarkable findings  -Now remains off sedation. Electrolytes, sodium, Hyperglycemia 2/2 high dose steroids requiring insulin gtt. Ammonia normal. May be prolonged 2/2 PNA, possibly worsened by uremic encephalopathy worsened by ELIESER now resolved, uremia improving; candida growth seen on BAL Cx from 3/11, may be respiraotry colonization, however currently without signs of systemic fungal infection but is at high risk 2/2 lymphopenia, depressed immunoglobulins in setting of B cell lymphoma and high dose corticosteroids.   Plan:  -Slow steroid wean: currently IV Solumedrol 30mg BID day 6  -S/P 14d Remdesivir course to tx COVID-19, completed 3/15  -S/P 14d Minocycline course to tx stenotrophomonas PNA, completed 3/27   -Continue modafinil as above  -Avoid PRN fentanyl/sedative meds as able.  -Delirium precautions  -Frequent neurochecks  -Defer MRI brain due to clinical improvement, not likely to      Diagnosis: seizure disorder, known history  -S/p partial left temporal lobe resection in 2007 2/2 complex migraines  -On Lamictal 150 bid and Topamax 50mg bid at home  -Last lamotrigine level from 03/02 at 10.7 (therapeutic)  -Home Lamictal switched to Vimpat 3/27 due to worsening pancytopenia, neuro following  -Continue Vimpat 100mg bid maintenance dose  -Seizure precautions      Diagnosis: Anxiety, known history  -On Effexor 12.5 mg TID     CV:  -Diagnosis: Sinus tachycardia  -TTE 3/17 with no major structural dysfunction  -Multifactorial 2/2 deconditioning, dehydration/hypvol, acute on chronic anemia, underlying infectious/inflammatory process, pain, Modafinal-induced  Plan:  - See plans under Pulm, Heme/Onc,  ID    Pulm:  Diagnosis: Acute hypoxic respiratory failure;  Diagnosis: Bilateral ground glass opacities, improving  -Vent dependent; s/p trach 3/12 after was reintubated 3/11 due to increased WOB  -Persistently COVID+ since admission s/p multiple courses of antivirals (most recent completed 3/15), complicated by recurrent bacterial PNA treated w 10d levaquin (completed 2/25) for MDR PNA; most recent BAL +recurrent stenotrophomona treated with Bactrim, swithced to minocyline 14d course completed 3/28.  -Etiology Likely multifactorial including possible COVID pneumonitis vs recurrent PNA vs hypersensitivity pneumonitis 2/2 ?rituxumab. Persistent inflammation likely given patient has been steroid responsive throughout admission.   -CRP increasing but likely from intraabdominal inflammation as patient is noted to have clinical improvement with weaning of steroids, ABx. Repeat CXR 3/22 with significant improvement of multifocal PNA. Repeat COVID ag negative x2 3/27  Plan:  -S/P 14d Remdesivir course to tx COVID-19, completed 3/15  -S/P 14d Minocycline course to tx stenotrophomonas PNA, completed 3/27   -Follow up anti-Rituximab ab  -Steroid wean IV Solu-Medrol 30 mg bid x7d course, followed by 20mg BID x7d.  -Hold off on starting empiric antifungal given clinical improvement  -Continue trach collar, wean O2 as able     GI:  Diagnosis: Partial cecal volvulus s/p right hemicolectomy with ostomy pouch in place  -Complicated by poor wound healing of midline incision as evidenced by wound dehiscence s/p wound vac that was removed 3/17 by gen surg.  -Stoma with dark brown output, consistent with prior  Plan:  -Wound vac as per general surgery  -Monitor ostomy pouch output  -Surgery following, will keep them updated if any further changes     Diagnosis: oropharyngeal dysphagia   -Has had multiple and prolonged intubations now s/p trach   -VBS 3/27 with oropharyngeal dysphagia  -Speech therapy following for tx and rehab  including potential neuromuscular electrical stim/NMES/VitalStim, however will need TF in the interim and likely PEG pending abdominal wound healing      :  Diagnosis: Hypernatremia, improving with D5  Sodium 147 today (corrected for BS), compared to 146  -Increase D5W to 75cc  -Free water flushes to 300cc q4h     Diagnosis: uremia, improving  Trending down  -?catabolic from steroids, may also have ?slow UGIB in setting of prolonged steroids though no overt s/s of GIB and H&H stable since 3/23 and patient is on ppi  -Trending down  -Will consider GI consultation for potential EGD inpatient if clinical s/s UGIB     Diagnosis: CKD III,   -Baseline Cr  0.8-1.2  -Cr now stable and at baseline.   -UO adequate, on Ortiz, draining clear yellow urine  -Prerenal azotemia 2/2 uremia,   Plan:  -Trend daily BMP  -Continue to monitor I/O and UOP  -Avoid nephrotoxins, contrast dye as able  -See plan under uremia     F/E/N:  F: D5W 75cc/hr  E: monitor and replete for goal K>4, P>3, Mg>2  N: tube feeds with vital 1.5; 45 cc/h, Prosource liquid protein to 1 packet BID, Refugio BID to support wound healing       Heme/Onc:  Diagnosis: Pancytopenia  -In setting of hx of B cell lymphoma, prior chemo, Rituxan therapy, prior abx and present meds including lamictal  -Hemo onc consulted, given Filgastrim 300mg x3, day 2/3; IR bone marrow bx deferred by heme-onc   -Lamictal switched to Vimpat in consultation with neuro as above   -Trend CBC and diff daily, neutropenic precautions for ANC <500    Diagnosis: Acute on chronic anemia  -Baseline Hgb ~7-8. S/P 2U PRBCs 3/17 after repeat Hgb 5.3, total of 6U transfuse  d since admission.  -Patient had significant blood loss from ostomy site 3/20, resulting in hemorraghic shock, improved with blood transfusion; hemostasis achieved after bleeding source identified and sutured. Another large vol danie bloody output from osotomy on 3/21, bleeding source within ostomy site, sutured.    -Hgb dropping  today 8.7, despite hypernatremia/being dry  -May still have slow GIB due to persistent uremia, GI previously consulted for EGD but was deferred due to hemodynamic instability at the time  -?Worsened by impaired marrow response liekly 2/2 persistent inflammation due to low retic index ~1 prior to most recent transfusions; normocytic with normal B12/folate levels; MCV<100. Iatrogenic cause likely contributing 2/2 frequent blood draws; chronic anemia likely persistent inflammatory state/CKD/lymphoma in setting of elevated ferritin of 974. SPEP/UPEP negative. Hemolysis workup negative.   Plan:  -Routine monitoring ostomy output, if bleeding, apply direct pressure and contact gen surg STAT for hemostasis .  -Continue tube feeds/nutritional support  -Trend CBC, transfuse to maintain Hgb>7  -See plan under pancytopenia     Diagnosis: Thrombocytopenia  -In setting of infectious state, known history of B-cell lymphoma.  May also have component of marrow dysfunction 2/2 persistent inflammatory state. No liver dysfunction. No known history of vWD. .  -4T score suggestive of intermiediate probability of HIT at 14% --> HIT workup negative  Plan:  -Filgrastim 300mg x3 to aid in plt recovery  -Transfuse if PLT<30k due to increased risk of bleeding   -Goal >50k  -Hold Lovenox for DVT ppx, resume once Plt>50k  -See plan under pancytopenia      Diagnosis Lymphopenia with bandemia  -In setting of high dose steroids  -Severely depressed immunoglobulins inclding IgG, IgA, IgM  -At risk for further infections  -Filgrastim 300mg x3 to aid in plt recovery  -Contact and airborne precautions    Diagnosis: B cell lymphoma  -Follows with heme[facility]onc at [facility]  -S/P 6 cycles of chemotherapy in 20222  -Maintained on Rituxan for 2 year course PTA, started May 2022, last dose was 12/2024, treatment currently on hold in setting of persistent COVID-19 infection  Plan:  -Holding rituximab in setting of persistent COVID-19 infection, now resolved.  -Await  anti-Rituximab ab to assess for drug induced hypersensitivity syndrome  ?resume rituxubmab pending clinical stability & anti-ritux ab status in consultation with heme-onc     DVT ppx: hold lovenox 2/2 thrombocytopenia     Endo:  Diagnosis: steroid hyperglycemia and diabetes mellitus type 2, A1c at 6.6 from 01/2024  -Goal -180  -Insulin gtt     ID:  Diagnosis: Recurrent bacterial pneumonia  - Patient has completed multiple treatment courses of remdesivir throughout hospitalization in addition to 7 days of ceftriaxone.  - BAL cultures in 2/2024 positive for MDR Pseudomonas and stenotrophomonas treated with 10d course of Levaquin  - CT C/A/P 3/10 with persistent groundglass opacities and changes suggestive of sequelae of COVID, prior infections.  Completed 10d course of cefepime for broad spectrum coveragge (completed (3/13)  -Repeat BAL cultures from 3/11 showing 4+ growth Stenotrophomonas maltophilia. Could be colonization given prior BAL growth of same, however due to recent intubation with prolonged corticosteroid theraphy, will begin treating as true pathogen. Patient otherwise remains afebrile, no leukocytosis. CRP with down trending.  Previously on Bactrim from 3/13 to 3/18, discontinued due to worsening ELIESER  Plan:  -S/P 14d Minocycline course to tx stenotrophomonas PNA, completed 3/27   -IV steroids as above     MSK/Skin:  Diagnosis: Midline incision wound with poor wound healing-  -General surgery performed staple removal of the patient's midline incision on 3/12 with some skin signs appreciated at the inferior aspect of the wound without obvious pus drainage. Overnight 3/13, wound dehiscence progressed requiring general surgery reevaluation. Red/brown aspirate noted, fascia intact. Wet-to-dry dressings in place. General surgery following for next Epson wound care.  -Poor wound healing in setting of high-dose steroid therapy.  No  exam no obvious signs of infection at this time.   -S/P wound vac  replacement 3/13 as per gen surgery. No active concerns for cellulitis.  Plan:  -Wound VAC management as per general surgery  -Wound care for peritracheostomy wound  -Abdominal wound care as per general surgery  -Routine monitoring     Diagnosis: Critical illness myopathy  -Likely exacerbated by high-dose steroid therapy  Plan:  -Continue to wean steroids as above  -Aggressive PT/OT         Disposition: Critical care    ICU Core Measures     Vented Patient  VAP Bundle  VAP bundle ordered     A: Assess, Prevent, and Manage Pain Has pain been assessed? Yes  Need for changes to pain regimen? NA   B: Both Spontaneous Awakening Trials (SATs) and Spontaneous Breathing Trials (SBTs) Plan to perform spontaneous awakening trial today? N/A   Plan to perform spontaneous breathing trial today? N/A   Obvious barriers to extubation? NA   C: Choice of Sedation RASS Goal: N/A patient not on sedation  Need for changes to sedation or analgesia regimen? NA   D: Delirium CAM-ICU: Negative   E: Early Mobility  Plan for early mobility? Yes   F: Family Engagement Plan for family engagement today? Yes       Antibiotic Review: Patient on appropriate coverage based on culture data.  and Infectious disease consulted    Review of Invasive Devices:    Ortiz Plan: voiding trial today        Prophylaxis:  VTE Contraindicated secondary to: Plt <50   Stress Ulcer  covered bypantoprazole (PROTONIX) injection 40 mg [[id number]]        Significant 24hr Events     24hr events:   On [id number] bid 7d course. Last minocycline dose 3/27, completed 14d course. AntiRitux ab pending. Pancytopenia trending down; . Given Filgastrim (BM bx deferred) by heme-onc after reviewing prior records. TF's resumed. Lamictal d/c'd, vimpat loaded, on maintenance vimpat now (due to pancyto) as per neuro. PLTs improved 40 s/p 1U. HIT snyder negative. Covid neg, off precautions. VBS failed; hypernatremia improving, started on d5w at 50cc/hr infusion. Modafinil resumed for  alertness. Gen surg changed wound vac.     Overnight: started on insulin gtt due to uncontrolled BS on lantus and SSI algo#3.      Subjective   Patient denies any pain or discomfort via head nodding.     Review of Systems   Unable to perform ROS: Acuity of condition        Objective                            Vitals I/O      Most Recent Min/Max in 24hrs   Temp 98 °F (36.7 °C) Temp  Min: 97.7 °F (36.5 °C)  Max: 98.4 °F (36.9 °C)   Pulse (!) 129 Pulse  Min: 114  Max: 130   Resp 21 Resp  Min: 17  Max: 22   /83 BP  Min: 94/56  Max: 146/83   O2 Sat 99 % SpO2  Min: 94 %  Max: 100 %     LDA  Peripherals (R, L)    Ileostomy RUQ 36d, draining bilious o/p  Wound vac  NGT 22d  Ortiz, day 2  Surgical airway , cuffed 14d   Intake/Output Summary (Last 24 hours) at 3/28/2024 0715  Last data filed at 3/28/2024 0600  Gross per 24 hour   Intake 3649.9 ml   Output 2335 ml   Net 1314.9 ml       Diet Enteral/Parenteral; Tube Feeding No Oral Diet; Vital 1.5; Continuous; 45; Prosource Protein Liquid - One Packet; OD; Refugio - One Packet; BID; 300; Water; Every 4 hours    Invasive Monitoring           Physical Exam   Physical Exam  Eyes:      Pupils: Pupils are equal, round, and reactive to light.   Skin:     General: Skin is warm and dry.   HENT:      Nose: No epistaxis.      Mouth/Throat:      Mouth: Mucous membranes are moist.   Neck:      Trachea: Tracheostomy present.   Cardiovascular:      Rate and Rhythm: Regular rhythm. Tachycardia present.      Heart sounds: No murmur heard.  Musculoskeletal:         General: No swelling.      Right lower leg: No edema.      Left lower leg: No edema.   Abdominal: General: An ostomy site is present. There is ostomy site.     Palpations: Abdomen is soft.      Tenderness: There is no abdominal tenderness.      Comments: Wound vac   Constitutional:       Appearance: She is ill-appearing.      Interventions: She is not sedated and not intubated.  Pulmonary:      Effort: No accessory muscle usage,  respiratory distress or accessory muscle usage. She is not intubated.      Breath sounds: Normal breath sounds. No wheezing.   Neurological:      General: No focal deficit present.      Mental Status: She is alert.      Comments: Able to follow commands with head nodding,   Weak  strength BUE and Unable to assess strength due to profound weakness in all extremities    Genitourinary/Anorectal:     Comments: Ortiz draining clear yellow urine  Ortiz present.          Diagnostic Studies      EKG: no new  Imaging: no new I have personally reviewed pertinent reports.       Medications:  Scheduled PRN   chlorhexidine, 15 mL, Q12H SARTHAK  Filgrastim-aafi, 300 mcg, Daily  ipratropium, 0.5 mg, TID  lacosamide, 100 mg, Q12H SARTHAK  levalbuterol, 1.25 mg, TID  methylPREDNISolone sodium succinate, 30 mg, BID  modafinil, 100 mg, Daily  nystatin, , BID  pantoprazole, 40 mg, Q12H SARTHAK  polyethylene glycol, 17 g, Daily  potassium chloride, 20 mEq, Q2H  sodium chloride, 2 spray, BID  sodium chloride, 4 mL, TID  topiramate, 50 mg, BID  venlafaxine, 12.5 mg, TID With Meals      acetaminophen, 650 mg, Q6H PRN  fentaNYL, 25 mcg, Q1H PRN  ondansetron, 4 mg, Q6H PRN  sodium chloride, 1 Application, Q1H PRN       Continuous    dextrose, 75 mL/hr, Last Rate: 50 mL/hr (03/27/24 1109)  insulin regular (HumuLIN R,NovoLIN R) 1 Units/mL in sodium chloride 0.9 % 100 mL infusion, 0.3-21 Units/hr, Last Rate: 4 Units/hr (03/28/24 0628)           Labs:    CBC    Recent Labs     03/27/24  0545 03/28/24  0532   WBC 0.91* 1.07*   HGB 7.7* 7.8*   HCT 24.8* 24.6*   PLT 43* 34*   BANDSPCT 46*  --      BMP    Recent Labs     03/27/24  0545 03/28/24  0532   SODIUM 146 145   K 4.0 3.1*   * 114*   CO2 20* 19*   AGAP 12 12   BUN 56* 62*   CREATININE 0.83 0.83   CALCIUM 9.3 9.7       Coags    No recent results     Additional Electrolytes  Recent Labs     03/27/24  0545 03/28/24  0532   MG 2.0 2.0   PHOS 4.6* 3.9          Blood Gas    No recent results  No  recent results LFTs  No recent results      Infectious  No recent results     COVID ag 3/25 negative  COVID ag 3/27 negative    BAL 3/11   --4+ steno malto., 2+ candida  --Viral Cx neg  --Fungal 4+ candida  CMV neg  PCP smear neg  COVID pcr + on 3/11 Glucose  Recent Labs     03/26/24  1438 03/26/24  2046 03/27/24  0545 03/28/24  0532   GLUC 202* 179* 263* 201*               Joe Lazcano DO

## 2024-03-29 ENCOUNTER — TELEPHONE (OUTPATIENT)
Dept: OTHER | Facility: HOSPITAL | Age: 59
End: 2024-03-29

## 2024-03-29 PROBLEM — R40.0 DROWSINESS: Status: ACTIVE | Noted: 2024-03-29

## 2024-03-29 PROBLEM — R33.9 URINARY RETENTION: Status: ACTIVE | Noted: 2024-03-29

## 2024-03-29 LAB
AMORPH URATE CRY URNS QL MICRO: ABNORMAL
ANION GAP SERPL CALCULATED.3IONS-SCNC: 11 MMOL/L (ref 4–13)
ANION GAP SERPL CALCULATED.3IONS-SCNC: 9 MMOL/L (ref 4–13)
BACTERIA UR QL AUTO: ABNORMAL /HPF
BASOPHILS # BLD MANUAL: 0 THOUSAND/UL (ref 0–0.1)
BASOPHILS NFR MAR MANUAL: 0 % (ref 0–1)
BILIRUB UR QL STRIP: NEGATIVE
BUDDING YEAST: PRESENT
BUN SERPL-MCNC: 52 MG/DL (ref 5–25)
BUN SERPL-MCNC: 56 MG/DL (ref 5–25)
CALCIUM SERPL-MCNC: 9.5 MG/DL (ref 8.4–10.2)
CALCIUM SERPL-MCNC: 9.8 MG/DL (ref 8.4–10.2)
CHLORIDE SERPL-SCNC: 109 MMOL/L (ref 96–108)
CHLORIDE SERPL-SCNC: 110 MMOL/L (ref 96–108)
CK SERPL-CCNC: 15 U/L (ref 26–192)
CLARITY UR: ABNORMAL
CO2 SERPL-SCNC: 18 MMOL/L (ref 21–32)
CO2 SERPL-SCNC: 19 MMOL/L (ref 21–32)
COLOR UR: ABNORMAL
CREAT SERPL-MCNC: 0.65 MG/DL (ref 0.6–1.3)
CREAT SERPL-MCNC: 0.69 MG/DL (ref 0.6–1.3)
EOSINOPHIL # BLD MANUAL: 0 THOUSAND/UL (ref 0–0.4)
EOSINOPHIL NFR BLD MANUAL: 0 % (ref 0–6)
ERYTHROCYTE [DISTWIDTH] IN BLOOD BY AUTOMATED COUNT: 16.8 % (ref 11.6–15.1)
GFR SERPL CREATININE-BSD FRML MDRD: 96 ML/MIN/1.73SQ M
GFR SERPL CREATININE-BSD FRML MDRD: 98 ML/MIN/1.73SQ M
GLUCOSE SERPL-MCNC: 126 MG/DL (ref 65–140)
GLUCOSE SERPL-MCNC: 134 MG/DL (ref 65–140)
GLUCOSE SERPL-MCNC: 139 MG/DL (ref 65–140)
GLUCOSE SERPL-MCNC: 152 MG/DL (ref 65–140)
GLUCOSE SERPL-MCNC: 158 MG/DL (ref 65–140)
GLUCOSE SERPL-MCNC: 163 MG/DL (ref 65–140)
GLUCOSE SERPL-MCNC: 169 MG/DL (ref 65–140)
GLUCOSE SERPL-MCNC: 181 MG/DL (ref 65–140)
GLUCOSE SERPL-MCNC: 187 MG/DL (ref 65–140)
GLUCOSE SERPL-MCNC: 190 MG/DL (ref 65–140)
GLUCOSE SERPL-MCNC: 235 MG/DL (ref 65–140)
GLUCOSE SERPL-MCNC: 244 MG/DL (ref 65–140)
GLUCOSE SERPL-MCNC: 245 MG/DL (ref 65–140)
GLUCOSE SERPL-MCNC: 245 MG/DL (ref 65–140)
GLUCOSE SERPL-MCNC: 257 MG/DL (ref 65–140)
GLUCOSE UR STRIP-MCNC: NEGATIVE MG/DL
H CAPSUL AG UR IA-ACNC: NEGATIVE
HCT VFR BLD AUTO: 23.9 % (ref 34.8–46.1)
HGB BLD-MCNC: 7.6 G/DL (ref 11.5–15.4)
HGB UR QL STRIP.AUTO: ABNORMAL
HYPHAE YEAST: PRESENT
KETONES UR STRIP-MCNC: NEGATIVE MG/DL
LEUKOCYTE ESTERASE UR QL STRIP: ABNORMAL
LYMPHOCYTES # BLD AUTO: 0.01 THOUSAND/UL (ref 0.6–4.47)
LYMPHOCYTES # BLD AUTO: 1 % (ref 14–44)
MAGNESIUM SERPL-MCNC: 1.7 MG/DL (ref 1.9–2.7)
MCH RBC QN AUTO: 30.9 PG (ref 26.8–34.3)
MCHC RBC AUTO-ENTMCNC: 31.8 G/DL (ref 31.4–37.4)
MCV RBC AUTO: 97 FL (ref 82–98)
METAMYELOCYTES NFR BLD MANUAL: 1 % (ref 0–1)
MISCELLANEOUS LAB TEST RESULT: NORMAL
MONOCYTES # BLD AUTO: 0.07 THOUSAND/UL (ref 0–1.22)
MONOCYTES NFR BLD: 6 % (ref 4–12)
MUCOUS THREADS UR QL AUTO: ABNORMAL
MYELOCYTES NFR BLD MANUAL: 1 % (ref 0–1)
NEUTROPHILS # BLD MANUAL: 1.05 THOUSAND/UL (ref 1.85–7.62)
NEUTS BAND NFR BLD MANUAL: 39 % (ref 0–8)
NEUTS SEG NFR BLD AUTO: 52 % (ref 43–75)
NITRITE UR QL STRIP: NEGATIVE
NON-SQ EPI CELLS URNS QL MICRO: ABNORMAL /HPF
NRBC BLD AUTO-RTO: 9 /100 WBC (ref 0–2)
PH UR STRIP.AUTO: 6 [PH]
PHOSPHATE SERPL-MCNC: 3.2 MG/DL (ref 2.7–4.5)
PLATELET # BLD AUTO: 22 THOUSANDS/UL (ref 149–390)
PLATELET BLD QL SMEAR: ABNORMAL
POIKILOCYTOSIS BLD QL SMEAR: PRESENT
POTASSIUM SERPL-SCNC: 3.5 MMOL/L (ref 3.5–5.3)
POTASSIUM SERPL-SCNC: 3.6 MMOL/L (ref 3.5–5.3)
PROT UR STRIP-MCNC: ABNORMAL MG/DL
RBC # BLD AUTO: 2.46 MILLION/UL (ref 3.81–5.12)
RBC #/AREA URNS AUTO: ABNORMAL /HPF
RBC MORPH BLD: PRESENT
SODIUM SERPL-SCNC: 137 MMOL/L (ref 135–147)
SODIUM SERPL-SCNC: 139 MMOL/L (ref 135–147)
SP GR UR STRIP.AUTO: 1.01 (ref 1–1.03)
UROBILINOGEN UR STRIP-ACNC: <2 MG/DL
WBC # BLD AUTO: 1.15 THOUSAND/UL (ref 4.31–10.16)
WBC #/AREA URNS AUTO: ABNORMAL /HPF

## 2024-03-29 PROCEDURE — 82550 ASSAY OF CK (CPK): CPT

## 2024-03-29 PROCEDURE — 99233 SBSQ HOSP IP/OBS HIGH 50: CPT | Performed by: STUDENT IN AN ORGANIZED HEALTH CARE EDUCATION/TRAINING PROGRAM

## 2024-03-29 PROCEDURE — 85027 COMPLETE CBC AUTOMATED: CPT

## 2024-03-29 PROCEDURE — 82948 REAGENT STRIP/BLOOD GLUCOSE: CPT

## 2024-03-29 PROCEDURE — 94669 MECHANICAL CHEST WALL OSCILL: CPT

## 2024-03-29 PROCEDURE — 84100 ASSAY OF PHOSPHORUS: CPT

## 2024-03-29 PROCEDURE — C9113 INJ PANTOPRAZOLE SODIUM, VIA: HCPCS | Performed by: INTERNAL MEDICINE

## 2024-03-29 PROCEDURE — 94664 DEMO&/EVAL PT USE INHALER: CPT

## 2024-03-29 PROCEDURE — NC001 PR NO CHARGE: Performed by: STUDENT IN AN ORGANIZED HEALTH CARE EDUCATION/TRAINING PROGRAM

## 2024-03-29 PROCEDURE — 85007 BL SMEAR W/DIFF WBC COUNT: CPT

## 2024-03-29 PROCEDURE — 80048 BASIC METABOLIC PNL TOTAL CA: CPT

## 2024-03-29 PROCEDURE — 94760 N-INVAS EAR/PLS OXIMETRY 1: CPT

## 2024-03-29 PROCEDURE — 83735 ASSAY OF MAGNESIUM: CPT | Performed by: STUDENT IN AN ORGANIZED HEALTH CARE EDUCATION/TRAINING PROGRAM

## 2024-03-29 PROCEDURE — 97530 THERAPEUTIC ACTIVITIES: CPT

## 2024-03-29 PROCEDURE — 94640 AIRWAY INHALATION TREATMENT: CPT

## 2024-03-29 PROCEDURE — 92526 ORAL FUNCTION THERAPY: CPT

## 2024-03-29 PROCEDURE — 81001 URINALYSIS AUTO W/SCOPE: CPT

## 2024-03-29 PROCEDURE — 99223 1ST HOSP IP/OBS HIGH 75: CPT | Performed by: UROLOGY

## 2024-03-29 PROCEDURE — 97110 THERAPEUTIC EXERCISES: CPT

## 2024-03-29 PROCEDURE — 97112 NEUROMUSCULAR REEDUCATION: CPT

## 2024-03-29 PROCEDURE — 99233 SBSQ HOSP IP/OBS HIGH 50: CPT | Performed by: PHYSICIAN ASSISTANT

## 2024-03-29 PROCEDURE — NC001 PR NO CHARGE: Performed by: SURGERY

## 2024-03-29 RX ORDER — SULFAMETHOXAZOLE AND TRIMETHOPRIM 200; 40 MG/5ML; MG/5ML
160 SUSPENSION ORAL EVERY 12 HOURS SCHEDULED
Status: CANCELLED | OUTPATIENT
Start: 2024-03-29

## 2024-03-29 RX ORDER — METHYLPREDNISOLONE SODIUM SUCCINATE 40 MG/ML
40 INJECTION, POWDER, LYOPHILIZED, FOR SOLUTION INTRAMUSCULAR; INTRAVENOUS DAILY
Status: DISCONTINUED | OUTPATIENT
Start: 2024-03-30 | End: 2024-04-02

## 2024-03-29 RX ORDER — SODIUM CHLORIDE, SODIUM GLUCONATE, SODIUM ACETATE, POTASSIUM CHLORIDE, MAGNESIUM CHLORIDE, SODIUM PHOSPHATE, DIBASIC, AND POTASSIUM PHOSPHATE .53; .5; .37; .037; .03; .012; .00082 G/100ML; G/100ML; G/100ML; G/100ML; G/100ML; G/100ML; G/100ML
1000 INJECTION, SOLUTION INTRAVENOUS ONCE
Status: COMPLETED | OUTPATIENT
Start: 2024-03-29 | End: 2024-03-29

## 2024-03-29 RX ORDER — SULFAMETHOXAZOLE AND TRIMETHOPRIM 800; 160 MG/1; MG/1
1 TABLET ORAL 3 TIMES WEEKLY
Status: DISCONTINUED | OUTPATIENT
Start: 2024-03-29 | End: 2024-04-02

## 2024-03-29 RX ORDER — SODIUM CHLORIDE, SODIUM GLUCONATE, SODIUM ACETATE, POTASSIUM CHLORIDE, MAGNESIUM CHLORIDE, SODIUM PHOSPHATE, DIBASIC, AND POTASSIUM PHOSPHATE .53; .5; .37; .037; .03; .012; .00082 G/100ML; G/100ML; G/100ML; G/100ML; G/100ML; G/100ML; G/100ML
500 INJECTION, SOLUTION INTRAVENOUS ONCE
Status: DISCONTINUED | OUTPATIENT
Start: 2024-03-29 | End: 2024-03-29

## 2024-03-29 RX ORDER — METHYLPREDNISOLONE SODIUM SUCCINATE 40 MG/ML
30 INJECTION, POWDER, LYOPHILIZED, FOR SOLUTION INTRAMUSCULAR; INTRAVENOUS 2 TIMES DAILY
Status: COMPLETED | OUTPATIENT
Start: 2024-03-29 | End: 2024-03-29

## 2024-03-29 RX ORDER — MAGNESIUM SULFATE HEPTAHYDRATE 40 MG/ML
4 INJECTION, SOLUTION INTRAVENOUS ONCE
Status: COMPLETED | OUTPATIENT
Start: 2024-03-29 | End: 2024-03-29

## 2024-03-29 RX ORDER — MODAFINIL 100 MG/1
200 TABLET ORAL DAILY
Status: DISCONTINUED | OUTPATIENT
Start: 2024-03-30 | End: 2024-04-01

## 2024-03-29 RX ADMIN — SODIUM CHLORIDE SOLN NEBU 3% 4 ML: 3 NEBU SOLN at 19:45

## 2024-03-29 RX ADMIN — SODIUM CHLORIDE, SODIUM GLUCONATE, SODIUM ACETATE, POTASSIUM CHLORIDE, MAGNESIUM CHLORIDE, SODIUM PHOSPHATE, DIBASIC, AND POTASSIUM PHOSPHATE 1000 ML: .53; .5; .37; .037; .03; .012; .00082 INJECTION, SOLUTION INTRAVENOUS at 12:36

## 2024-03-29 RX ADMIN — METHYLPREDNISOLONE SODIUM SUCCINATE 30 MG: 40 INJECTION, POWDER, FOR SOLUTION INTRAMUSCULAR; INTRAVENOUS at 22:09

## 2024-03-29 RX ADMIN — SODIUM CHLORIDE 9 UNITS/HR: 9 INJECTION, SOLUTION INTRAVENOUS at 05:47

## 2024-03-29 RX ADMIN — NYSTATIN: 100000 POWDER TOPICAL at 09:09

## 2024-03-29 RX ADMIN — PANTOPRAZOLE SODIUM 40 MG: 40 INJECTION, POWDER, FOR SOLUTION INTRAVENOUS at 08:26

## 2024-03-29 RX ADMIN — MAGNESIUM SULFATE HEPTAHYDRATE 4 G: 40 INJECTION, SOLUTION INTRAVENOUS at 08:23

## 2024-03-29 RX ADMIN — MODAFINIL 100 MG: 100 TABLET ORAL at 09:09

## 2024-03-29 RX ADMIN — FILGRASTIM-AAFI 300 MCG: 300 INJECTION, SOLUTION SUBCUTANEOUS at 09:08

## 2024-03-29 RX ADMIN — LACOSAMIDE ORAL SOLUTION 100 MG: 10 SOLUTION ORAL at 09:22

## 2024-03-29 RX ADMIN — TOPIRAMATE 50 MG: 100 TABLET, FILM COATED ORAL at 09:10

## 2024-03-29 RX ADMIN — NYSTATIN: 100000 POWDER TOPICAL at 18:02

## 2024-03-29 RX ADMIN — LEVALBUTEROL HYDROCHLORIDE 1.25 MG: 1.25 SOLUTION RESPIRATORY (INHALATION) at 14:28

## 2024-03-29 RX ADMIN — Medication 2 SPRAY: at 09:10

## 2024-03-29 RX ADMIN — SULFAMETHOXAZOLE AND TRIMETHOPRIM 1 TABLET: 800; 160 TABLET ORAL at 13:03

## 2024-03-29 RX ADMIN — TOPIRAMATE 50 MG: 100 TABLET, FILM COATED ORAL at 18:04

## 2024-03-29 RX ADMIN — LACOSAMIDE ORAL SOLUTION 100 MG: 10 SOLUTION ORAL at 22:16

## 2024-03-29 RX ADMIN — PANTOPRAZOLE SODIUM 40 MG: 40 INJECTION, POWDER, FOR SOLUTION INTRAVENOUS at 22:10

## 2024-03-29 RX ADMIN — CHLORHEXIDINE GLUCONATE 0.12% ORAL RINSE 15 ML: 1.2 LIQUID ORAL at 22:10

## 2024-03-29 RX ADMIN — VENLAFAXINE 12.5 MG: 25 TABLET ORAL at 18:01

## 2024-03-29 RX ADMIN — VENLAFAXINE 12.5 MG: 25 TABLET ORAL at 13:03

## 2024-03-29 RX ADMIN — METHYLPREDNISOLONE SODIUM SUCCINATE 30 MG: 40 INJECTION, POWDER, FOR SOLUTION INTRAMUSCULAR; INTRAVENOUS at 08:26

## 2024-03-29 RX ADMIN — POLYETHYLENE GLYCOL 3350 17 G: 17 POWDER, FOR SOLUTION ORAL at 09:09

## 2024-03-29 RX ADMIN — SODIUM CHLORIDE SOLN NEBU 3% 4 ML: 3 NEBU SOLN at 08:31

## 2024-03-29 RX ADMIN — LEVALBUTEROL HYDROCHLORIDE 1.25 MG: 1.25 SOLUTION RESPIRATORY (INHALATION) at 08:31

## 2024-03-29 RX ADMIN — SODIUM CHLORIDE SOLN NEBU 3% 4 ML: 3 NEBU SOLN at 14:28

## 2024-03-29 RX ADMIN — VENLAFAXINE 12.5 MG: 25 TABLET ORAL at 09:10

## 2024-03-29 RX ADMIN — Medication 1 APPLICATION: at 22:12

## 2024-03-29 RX ADMIN — INSULIN GLARGINE 10 UNITS: 100 INJECTION, SOLUTION SUBCUTANEOUS at 08:33

## 2024-03-29 RX ADMIN — Medication 2 SPRAY: at 22:12

## 2024-03-29 RX ADMIN — CHLORHEXIDINE GLUCONATE 0.12% ORAL RINSE 15 ML: 1.2 LIQUID ORAL at 09:08

## 2024-03-29 RX ADMIN — LEVALBUTEROL HYDROCHLORIDE 1.25 MG: 1.25 SOLUTION RESPIRATORY (INHALATION) at 19:45

## 2024-03-29 NOTE — ASSESSMENT & PLAN NOTE
Previously on maintenance Rituxan therapy. Last treatment in December 2023  Bone marrow bx deferred at this time per onc

## 2024-03-29 NOTE — OCCUPATIONAL THERAPY NOTE
Occupational Therapy Treatment Note      Carla Hunt    3/29/2024    Principal Problem:    Acute respiratory failure with hypoxia (HCC)  Active Problems:    Anxiety state    Encephalopathy    COVID    Stage 3b chronic kidney disease (CKD) (HCC)    Teto marginal zone B-cell lymphoma (HCC)    Seizure disorder (HCC)    Hypotension    Palliative care patient    Goals of care, counseling/discussion    Acute kidney injury (HCC)    Cecal volvulus (HCC)    Drowsiness    Urinary retention      Past Medical History:   Diagnosis Date    Hx of hypercalcemia     Lymphoma (HCC) 2021    Parathyroid disease (HCC)     Seizures (HCC)     Situational depression     24 yr old son  from drug overdose       Past Surgical History:   Procedure Laterality Date    CRANIOTOMY FOR TEMPORAL LOBECTOMY Left     LA LAPS ABD PRTM&OMENTUM DX W/WO SPEC BR/WA SPX N/A 2024    Procedure: LAPAROSCOPY DIAGNOSTIC,EXPLORATORY LAPAROTOMY, RIGHT KARY COLECTOMY,ILEOSTOMY, MUCUS FISTULA;  Surgeon: Lauryn Ullrich, DO;  Location: [facility];  Service: General        24 1444   OT Last Visit   OT Visit Date 24   Note Type   Note Type Treatment   Pain Assessment   Pain Assessment Tool FLACC   Pain Rating: FLACC (Rest) - Face 0   Pain Rating: FLACC (Rest) - Legs 0   Pain Rating: FLACC (Rest) - Activity 0   Pain Rating: FLACC (Rest) - Cry 0   Pain Rating: FLACC (Rest) - Consolability 0   Score: FLACC (Rest) 0   Pain Rating: FLACC (Activity) - Face 0   Pain Rating: FLACC (Activity) - Legs 0   Pain Rating: FLACC (Activity) - Activity 0   Pain Rating: FLACC (Activity) - Cry 0   Pain Rating: FLACC (Activity) - Consolability 0   Score: FLACC (Activity) 0   Restrictions/Precautions   Weight Bearing Precautions Per Order No   Braces or Orthoses Other (Comment)  (B/L resting hand splints and prevalon boots)   Other Precautions Cognitive;Aspiration;Multiple lines;Telemetry;Fall Risk;O2;Pain;Visual impairment  (trach, trach collar 10L,  jacky, VAC, marie, neutropenic precautions)   Lifestyle   Autonomy I adls and mobility - i iadls - shares homemaking with family   Reciprocal Relationships supportive family   Service to Others volunteers   Intrinsic Gratification Enjoys spending time with her family   ADL   Eating Assistance 1  Total Assistance   Eating Deficit Other (Comment)  (jacky)   LB Dressing Assistance 1  Total Assistance   LB Dressing Deficit Don/doff R sock;Don/doff L sock   Bed Mobility   Supine to Sit 2  Maximal assistance   Additional items Assist x 2;Assist x 3;Increased time required;Verbal cues;LE management   Sit to Supine 2  Maximal assistance   Additional items Assist x 2;Increased time required;Verbal cues;LE management;Assist x 3   Additional Comments pt sat EOB for 20 mins w/ Max A for sitting balance/trunk control; presents w/ posterior lean; VC/TC for upright/midline posture and head/neck extension. Improved consistently w/ initiating head/neck extension while seated EOB. Flexes w/ fatigue   Transfers   Sit to Stand 2  Maximal assistance   Additional items Assist x 3;Assist x 2;Increased time required;Verbal cues   Stand to Sit 2  Maximal assistance   Additional items Assist x 2;Assist x 3;Increased time required;Verbal cues   Additional Comments x2 attempts; B/L HHA and knee block; 3rd present from behind to support hips/buttocks.   Therapeutic Exercise - ROM   UE-ROM Yes   ROM- Right Upper Extremities   R Shoulder PROM;Flexion   R Elbow PROM;Elbow flexion;Elbow extension   R Wrist PROM   R Hand AAROM  (increased AROM of digits)   R Position Seated   ROM - Left Upper Extremities    L Shoulder PROM;Flexion   L Elbow PROM;Elbow flexion;Elbow extension   L Wrist PROM   L Hand AAROM  (increased AROM of digits)   L Position Seated   Subjective   Subjective nonverbal   Cognition   Overall Cognitive Status Impaired   Arousal/Participation Arousable;Lethargic;Poorly responsive   Attention Difficulty attending to directions    Orientation Level Unable to assess   Memory Unable to assess   Following Commands Follows one step commands inconsistently   Comments pt is cooperative/pleasant; nonverbal 2/2 trach (passymiur valve not on); slightly more alert/attentive as compared to previous session. Family in room, spouse and kids to provide motivation to pt.   Vision   Vision Comments Increased tracking noted to L/R side w/ Max VC to initiate.   Activity Tolerance   Activity Tolerance Patient limited by fatigue;Patient limited by pain   Medical Staff Made Aware PT, SPT, RN, SLP   Assessment   Assessment Patient participated in Skilled OT session 3/29/2024 with interventions consisting of Energy Conservation techniques, deep breathing technique, safety awareness and fall prevention techniques,  therapeutic activities to: increase activity tolerance, increase standing tolerance time with unilateral UE support to complete sink level ADLs, increase postural control, and increase trunk control . Patient agreeable to OT treatment session, upon arrival patient was found supine in bed.  In comparison to previous session, patient with improvements in EOB sitting tolerance, head/neck control, visual tracking/alertness, and transfers. Patient requiring frequent re direction, verbal cues for safety, verbal cues for correct technique, verbal cues for pacing thru activity steps, and frequent rest periods. Patient continues to be functioning below baseline level, occupational performance remains limited secondary to factors listed above and increased risk for falls and injury.   From OT standpoint, recommendation at time of d/c would be post acute rehab. Patient to benefit from continued Occupational Therapy treatment while in the hospital to address deficits as defined above and maximize level of functional independence with ADLs and functional mobility.   Plan   Treatment Interventions ADL retraining;Functional transfer training;Visual perceptual  retraining;UE strengthening/ROM;Endurance training;Cognitive reorientation;Patient/family training;Equipment evaluation/education;Neuromuscular reeducation;Fine motor coordination activities;Compensatory technique education;Continued evaluation;Energy conservation;Activityengagement   Goal Expiration Date 04/02/24   OT Treatment Day 16   OT Frequency 3-5x/wk   Discharge Recommendation   Rehab Resource Intensity Level, OT II (Moderate Resource Intensity)   Additional Comments  The patient's raw score on the AM-[facility] Daily Activity Inpatient Short Form is 6. A raw score of less than 19 suggests the patient may benefit from discharge to post-acute rehabilitation services. Please refer to the recommendation of the Occupational Therapist for safe discharge planning.   Additional Comments 2 Pt seen as a co-session due to the patient's co-morbidities, clinically unstable presentation, and present impairments which are a regression from the patient's baseline   AM-[facility] Daily Activity Inpatient   Lower Body Dressing 1   Bathing 1   Toileting 1   Upper Body Dressing 1   Grooming 1   Eating 1   Daily Activity Raw Score 6   Turning Head Towards Sound 2   Follow Simple Instructions 2   Low Function Daily Activity Raw Score 10   Low Function Daily Activity Standardized Score  17.31   AM-[facility] Applied Cognition Inpatient   Following a Speech/Presentation 1   Understanding Ordinary Conversation 2   Taking Medications 1   Remembering Where Things Are Placed or Put Away 1   Remembering List of 4-5 Errands 1   Taking Care of Complicated Tasks 1   Applied Cognition Raw Score 7   Applied Cognition Standardized Score 15.17   End of Consult   Education Provided Yes;Family or social support of family present for education by provider   Patient Position at End of Consult Supine;Bed/Chair alarm activated;All needs within reach   Nurse Communication Nurse aware of consult     Felisha Wilkerson MS, OTR/L

## 2024-03-29 NOTE — ASSESSMENT & PLAN NOTE
Continue Effexor per primary  Patient does not appear anxious during time of encounter if anything has been more sedate (despite no sedating medications ordered or given)

## 2024-03-29 NOTE — ASSESSMENT & PLAN NOTE
Brain imaging has been negative  No sedating medications on board  Responded well to provigil 100mg Qam but now drowsy again, no obvious etiology or source. Discussed with primary regarding consideration to increase to 200mg Qam

## 2024-03-29 NOTE — PLAN OF CARE
Problem: PHYSICAL THERAPY ADULT  Goal: Performs mobility at highest level of function for planned discharge setting.  See evaluation for individualized goals.  Description:    Equipment Recommended:  (none)       See flowsheet documentation for full assessment, interventions and recommendations.  Outcome: Progressing  Note: Prognosis: Fair  Problem List: Decreased strength, Decreased range of motion, Decreased endurance, Impaired balance, Decreased mobility, Decreased cognition, Decreased coordination, Impaired judgement, Decreased safety awareness, Decreased skin integrity, Pain  Assessment: Pt agreeable to PT session. PT session focused on bed mobility, transfers, and neuro-reeducation. Pt did tolerate treatment well today. Pt was able to perform bed mobility / max assist x 2 (trunk support and LE management provided). Pt was able to sit EOB w/ max assist x 1 and performed UE and LE exercises. Pt was able to perform 2 sit to stands w/ max assist x 3 (HHA x 2, b/l knee blocking and support at ischial tuberosities, +1 behind for full trunk extension). Able to tolerate standing longer at today's session, 30 seconds. Pt would continue to benefit from skilled PT. Pt will continue to be seen upon admission. Pt's prognosis is Fair secondary to age, PLOF, medical complications, and progress so far. The patient's AM-[facility] Basic Mobility Inpatient Standardized Score is less than 42.9, suggesting this patient may benefit from discharge to post-acute rehabilitation services. Please also refer to the recommendation of the Physical Therapist for safe discharge planning.  Barriers to Discharge: None     Rehab Resource Intensity Level, PT: I (Maximum Resource Intensity)    See flowsheet documentation for full assessment.

## 2024-03-29 NOTE — PROCEDURES
Acute Care Surgery  Bedside V.A.C. Procedure Note    A timeout was performed with the patient's nurse, Donna, prior to beginning the dressing change. The nurse remained present to confirm the correct dressing counts on removal of the VAC dressing and was debriefed at the completion of the dressing change.    Location of wound: Midline abdomen    Dressings and Foam removed:  1 oil emulsion gauze dressing  2 Black Foam - 1 was removed from the wound and the second was outside the wound used as a bridge to the lateral abdominal wall.    Dimensions of wound: 10 cm x 5 cm x 4 cm    Description of wound: Small amounts of stool at wound base from defect to ostomy . Sutures intact.     VAC dressing application:  The periwound skin was cleaned and dried.   The defect under the skin bridge to the ostomy was inspected\. There was some leakage of stool. An additional 2-0 vicryl stitch was applied in a figure of 8 to help close this defect. One figure of 8 was placed for bleeding using this vicryl stitch on the right side of the wound wall midline. One piece of oil emulsion gauze dressing was placed at the wound base followed by 1 piece of white foam to the side of the wound that communicates with the peristomal wound; 1 piece of black foam cut to size and placed into the wound. Fressings were then covered with VAC drape. Additional VAC drape and black foam was used to create a bridge to the patient's left lateral abdominal wall and a base for the track pad. The track pad was then placed over the base of black foam. The VAC was then set to 125 mmHg low Continuous suction. The patient tolerated the procedure well and there were no complications. NHo excisional debridement during today's dressing change.    Once the VAC dressing was in place and no leak was observed, a new 1 piece ostomy appliance was applied.    VAC settings:  125 mmHg  Continuous    Wound Images:        Additional Notes:  The VAC sticker placed over the dressing  per protocol.  The next VAC dressing change will be planned for Monday 4/1    This dressing change took 20 minutes to complete.    Jerardo Workman MD  3/29/2024 2:42 PM

## 2024-03-29 NOTE — PROGRESS NOTES
Progress Note - Infectious Disease   Carla Hunt 59 y.o. female MRN: [id number]  [facility]: [id number]      Impression/Plan:    1. Acute hypoxic respiratory failure, likely multifactorial, with both COVID and bacterial pneumonia contributing.  Also consider persistent inflammation, fibrosis as seems quite steroid responsive in past.  Most recently extubated 3/1.  Patient with worsening respiratory status requiring intubation again 3/11.  Suspect ongoing hypoxia related to persistent COVID-pneumonia, superimposed bacterial infection, inflammatory component/lung fibrosis related to COVID, now with profound deconditioning and muscle weakness.  Status post bronchoscopy and trach 3/12 with excessive secretions seen from the lower lobes bilaterally.  BAL culture from 3/11 shows heavy growth of Stenotrophomonas maltophilia.  PJP DFA negative. While the patient has grown this before and she certainly may be colonized, given worsening CT findings, intubation and prolonged steroids would treat as a true pathogen.  Status post 10 days of vancomycin and cefepime, 14-day course of remdesivir/Paxlovid 3/15, 14-day course of antibiotics with minocycline for stenotrophomonas pneumonia. Status post repeat bronchoscopy 3/17 for airway clearance with continued thick secretions. Low concern for uncontrolled infection contributing to clinical picture. Respiratory status improving, patient now on trach collar 8L O2. No fevers.   -Monitor off additional antibiotics  -Steroid dosing per critical care, tolerating weaning   -No indication to treat Candida in respiratory culture, this is respiratory colonization  -If clinically deteriorates with concern for progressive sepsis would start meropenem 1g IV Q8   -Fungitell is positive but low clinical concern for invasive fungal infection at this time; recent antibiotics, blood transfusion, candida in sputum may be causing false positive result.  The patient has been  improving without any antifungal therapy and empiric therapy raises risk of further affecting normal lauren.  Will continue to monitor. Histoplasma urine Ag negative     2. SIRS versus sepsis.  Fluctuating fever, WBC.  Likely due to persistent COVID, bacterial pneumonia. CT C/A/P 3/10 shows stable groundglass and nodular opacities, inflammation around stoma. Now with dehiscence of her abdominal wound following staple removal, status post wound VAC placement 3/13. Patient afebrile, without leukocytosis, weaning on trach collar. No new clinical signs of infection  -Follow temperatures closely  -Recheck WBC in AM to monitor infection  -Supportive care as per the primary service     3. Recurrent bacterial pneumonia.  The patient has completed multiple treatment courses over the hospitalization. Prior BAL cultures with Pseudomonas and stenotrophomonas.  Unclear if pathogens or colonizers.  Status post 10 days levofloxacin.  CT C/A/P showed evolving changes likely all due to sequelae of COVID, infections.  Noted to have worsening hypoxia and purulent secretions on bronchoscopy 3/11.  BAL culture with heavy growth of Stenotrophomonas maltophilia, Candida.  Completed 14-day course of minocycline 3/27              -Monitor off further antibiotics              -Wean O2 as able     4. Severe COVID, present on admission.  Patient was treated with a 10-day course of remdesivir, dexamethasone and was given 1 dose of Tocilizumab on admission.  COVID antigen was negative prior to coming off isolation.  Had positive COVID PCR from BAL 2/16, status post another 5-day course of remdesivir.  Patient has remained on high-dose systemic corticosteroid throughout her hospitalization.  COVID antigen positive and PCR is also positive 3/3 and Ct 26.8, consistent with high viral replication.  Repeat PCR positive with Ct closer to 30. Status post 14-day course of remdesivir/Paxlovid 3/15/2024.  Rapid COVID antigen negative 3/25 and  3/27  -Steroid wean per ICU  -No additional antivirals indicated  -Patient started on Bactrim for PJP prophylaxis.  If continued pancytopenia would recommend switch to atovaquone     5. 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  Patient is status post exploratory laparotomy with ileocecectomy and end ileostomy creation 2/20.   End ileostomy is with ongoing ischemic changes which is likely contributing to the imaging findings and ongoing SIRS response.  Now with wound dehiscence status post staple removal, status post wound VAC placement 3/13, removed but replaced due to bleeding  -Serial exams  -Surgery follow-up   -no current signs of infection, need for antibiotics      B-cell lymphoma, on maintenance rituxan, which is currently postponed.  Rituximab is a risk factor for persistent COVID infection.    7.  ELIESER.  More likely due to hypovolemia since creatinine was increasing prior to starting Bactrim.  Creatinine now improving   -Monitor creatinine closely   -Dose adjust antibiotics as needed    8.  Anemia, thrombocytopenia, lymphopenia/neutropenia.  Patient has had persistent lymphopenia throughout this admission, likely due to rituximab, viral infection, high-dose steroids.  Now with worsening anemia, neutropenia, and thrombocytopenia.  Bactrim discontinued 3/18. CMV PCR negative.  Not a likely side effect of minocycline.  Immunoglobulins are low.  Started on Granix 3/27   -Steroid wean per primary   -Transfusion support per primary   -Hematology consulted, recommending Granix as unable to perform BM Bx due to positioning, other co morbidities    -Monitor CBC    Above management plan to monitor off further antibiotics discussed with the critical care team who is in agreement.  Discussed with the patient's  at bedside.  ID will see again 4/1/24, please call with questions.    Antibiotics:  Off antibiotics     Subjective:  The patient remains clinically stable.  She was seen undergoing  percussion vest therapy.  She was more awake than yesterday although her  reports she was more lethargic earlier.  No fever or chills.    Objective:  Vitals:  Temp:  [97.6 °F (36.4 °C)-99.1 °F (37.3 °C)] 98.6 °F (37 °C)  HR:  [118-137] 124  Resp:  [21-36] 30  BP: (119-156)/(61-81) 132/76  SpO2:  [89 %-99 %] 89 %  Temp (24hrs), Av.6 °F (37 °C), Min:97.6 °F (36.4 °C), Max:99.1 °F (37.3 °C)  Current: Temperature: 98.6 °F (37 °C)    Physical Exam:   General Appearance:  Ill-appearing, lethargic   Neck: Trach in place.   Lungs:   Rhonchi bilaterally   Heart:  RRR; no murmur, rub or gallop   Abdomen:   Midline ostomy with brown stool, wound VAC in place   Extremities: No edema   Skin: No new rashes or lesions.        Labs:   All pertinent labs and imaging studies were personally reviewed  Results from last 7 days   Lab Units 24  0556 24  0532 24  0545   WBC Thousand/uL 1.15* 1.07* 0.91*   HEMOGLOBIN g/dL 7.6* 7.8* 7.7*   PLATELETS Thousands/uL 22* 34* 43*     Results from last 7 days   Lab Units 24  0556 24  0532 24  0545 24  0523 24  0437 24  0519   SODIUM mmol/L 137 145 146   < > 150* 150*   POTASSIUM mmol/L 3.5 3.1* 4.0   < > 4.4 4.8   CHLORIDE mmol/L 110* 114* 114*   < > 119* 116*   CO2 mmol/L 18* 19* 20*   < > 23 21   BUN mg/dL 56* 62* 56*   < > 68* 75*   CREATININE mg/dL 0.69 0.83 0.83   < > 0.99 1.12   EGFR ml/min/1.73sq m 96 77 77   < > 63 54   CALCIUM mg/dL 9.5 9.7 9.3   < > 9.2 8.8   AST U/L  --   --   --   --  13 21   ALT U/L  --   --   --   --  41 40   ALK PHOS U/L  --   --   --   --  115* 98    < > = values in this interval not displayed.           Results from last 7 days   Lab Units 24  0523 24  0519   CRP mg/L 256.9* 249.3*                       Imaging:  CXR personally reviewed and shows mild improvement in multifocal bronchopneumonia

## 2024-03-29 NOTE — PROGRESS NOTES
[facility]  Progress Note: Critical Care  Name: Carla Hunt 59 y.o. female I MRN: [id number]  Unit/Bed#: MICU 12 I Date of Admission: 1/10/2024   Date of Service: 3/29/2024 I Hospital Day: 79    Assessment/Plan   Acute hypoxic respiratory failure; s/p tracheostomy 3/12  Bilateral ground glass opacities, persistent, improving  Pancytopenia- multifactorial including hx b-cell lymphoma, persistent inflammation  Acute on chronic anemia- multifactorial-intermittent blood loss from ostomy site, chronic inflammation, iatrogenic, marrow dysfunction in setting of persistent inflammation   Lymphopenia with bandemia, in setting of high dose corticosteroids  Severe Metabolic encephalopathy, multifactorial including ?uremia, improving  Uremia, ?catabolic from steroids, improving  Oropharyngeal dysphagia, NPO on TF, in setting of multiple prolonged intubations  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; complicated by poor wound healing  Wound dehiscence 2/2 poor wound healing s/p wound vac 3/13  B-Cell lymphoma, s/p chemotheraphy 2022, Rituxan maintenance therapy on hold 2/2 persistent covid-19  Seizure disorder; on vimpat  Steroid induced hyperglycemia;on insulin gtt  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  Hx of complex migraines s/p L temporal lobectomy 2007 complicated by #18  Hx of complex seizures in setting of #17, on AEDs  COVID-19 infection POA; resolved; s/p multiple courses of antivirals 2/2 persistent infection,  most recent Remdesivir course completed 3/15, superimposed by recurrent PNA s/p multiple courses Abx  Stenotrophomonas PNA; recurrent, previously on Bactrim, switched to minocycline 2/2 ELIESER now resolved, completed 14d course 3/27  Minimal SAH POA, no interventions required, resolved on repeat CTH     Neuro:    Diagnosis: Alertness  -Continue modafinil, delerium precautions  Diagnosis: Analgesia  - PRN Fentanyl 25mcg/hr; on  holiday for frequent neurochecks     Diagnosis: Metabolic encephalopathy; POA, improving  -Waxing and waning neuro exam since admission  -Most recent repeat CTH 3/13 negative for acute intracranial findings.  Has had extensive neurological workup including MRI/CT neuroimaging, LP with unremarkable findings  -Now remains off sedation. Electrolytes, sodium, Hyperglycemia 2/2 high dose steroids requiring insulin gtt. Ammonia normal. May be prolonged 2/2 PNA, possibly worsened by uremic encephalopathy worsened by ELIESER now resolved, uremia improving; candida growth seen on BAL Cx from 3/11, may be respiraotry colonization, however currently without signs of systemic fungal infection but is at high risk 2/2 lymphopenia, depressed immunoglobulins in setting of B cell lymphoma and high dose corticosteroids.   Plan:  -Slow steroid wean: currently IV Solumedrol 30mg BID day 7, followed by 40mg qd   -S/P 14d Remdesivir course to tx COVID-19, completed 3/15  -S/P 14d Minocycline course to tx stenotrophomonas PNA, completed 3/27   -Continue modafinil as above  -Avoid PRN fentanyl/sedative meds as able.  -Delirium precautions  -Frequent neurochecks  -Defer MRI brain due to clinical improvement, not likely to      Diagnosis: seizure disorder, known history  -S/p partial left temporal lobe resection in 2007 2/2 complex migraines  -On Lamictal 150 bid and Topamax 50mg bid at home  -Last lamotrigine level from 03/02 at 10.7 (therapeutic)  -Home Lamictal switched to Vimpat 3/27 due to worsening pancytopenia, neuro following  -Continue Vimpat 100mg bid maintenance dose  -Continue home topiramate 50mg bid  -Seizure precautions      Diagnosis: Anxiety, known history  -On Effexor 12.5 mg TID     CV:  -Diagnosis: Sinus tachycardia  -TTE 3/17 with no major structural dysfunction  -Multifactorial 2/2 deconditioning, dehydration/hypvol, acute on chronic anemia, underlying infectious/inflammatory process, pain,  Modafinal-induced  Plan:  - See plans under Pulm, Heme/Onc, ID, MSK    Pulm:  Diagnosis: Acute hypoxic respiratory failure;  Diagnosis: Bilateral ground glass opacities, improving  -Vent dependent; s/p trach 3/12 after was reintubated 3/11 due to increased WOB  -Persistently COVID+ since admission s/p multiple courses of antivirals (most recent completed 3/15), complicated by recurrent bacterial PNA treated w 10d levaquin (completed 2/25) for MDR PNA; most recent BAL +recurrent stenotrophomona treated with Bactrim, swithced to minocyline 14d course completed 3/28.  -Etiology Likely multifactorial including possible COVID pneumonitis vs recurrent PNA vs hypersensitivity pneumonitis 2/2 ?rituxumab. Persistent inflammation likely given patient has been steroid responsive throughout admission.   -CRP increasing but likely from intraabdominal inflammation as patient is noted to have clinical improvement with weaning of steroids, ABx. Repeat CXR 3/22 with significant improvement of multifocal PNA. Repeat COVID ag negative x2 3/27  Plan:  -S/P 14d Remdesivir course to tx COVID-19, completed 3/15  -S/P 14d Minocycline course to tx stenotrophomonas PNA, completed 3/27   -Follow up anti-Rituximab ab  -Steroid wean IV Solu-Medrol 30 mg bid x7d course, followed by 40mg qd x7d, decrease by 10mg qd dosing x7d  -Hold off on starting empiric antifungal given clinical improvement  -Continue trach collar, wean O2 as able     GI:  Diagnosis: Partial cecal volvulus s/p right hemicolectomy with ostomy pouch in place  -Complicated by poor wound healing of midline incision as evidenced by wound dehiscence s/p wound vac that was removed 3/17 by gen surg.  -Stoma with dark brown output, consistent with prior  Plan:  -Wound vac as per general surgery  -Monitor ostomy pouch output  -Surgery following, will keep them updated if any further changes     Diagnosis: oropharyngeal dysphagia   -Has had multiple and prolonged intubations now s/p  trach   -VBS 3/27 with oropharyngeal dysphagia  -Speech therapy to begin neuromuscular electrical stim/NMES/VitalStim,   -Continue tube feeds for now until PEG placement pending abdominal wound healing as per Gen Surg    :  Diagnosis: Hypernatremia, resolved with D5  Sodium 140 today (corrected for )  -Hold D5W   -Free water flushes to 300cc q4h     Diagnosis: uremia, improving  Trending down  -?catabolic from steroids, may also have ?slow UGIB in setting of prolonged steroids though no overt s/s of GIB and H&H stable since 3/23 and patient is on ppi  -Trending down  -Will consider GI consultation for potential EGD inpatient if clinical s/s UGIB     Diagnosis: CKD III,   -Baseline Cr  0.8-1.2  -Cr now stable and at baseline.   -UO adequate, on Ortiz, draining clear yellow urine  -Prerenal azotemia 2/2 uremia,   Plan:  -Trend daily BMP  -Continue to monitor I/O and UOP  -Avoid nephrotoxins, contrast dye as able  -See plan under uremia     F/E/N:  F: none  E: monitor and replete for goal K>4, P>3, Mg>2  N: tube feeds with vital 1.5; 45 cc/h, Prosource liquid protein to 1 packet BID, Refugio BID to support wound healing       Heme/Onc:  Diagnosis: Pancytopenia  -In setting of hx of B cell lymphoma, prior chemo, Rituxan therapy, prior abx and present meds including lamictal  -Hemo onc consulted, empirically given Filgastrim 300mg qd, day 3/3; IR bone marrow bx deferred by heme-onc   -Lamictal switched to Vimpat in consultation with neuro as above due to potential contribution to pancytopenia  -Trend CBC and diff daily, neutropenic precautions for ANC <500    Diagnosis: Acute on chronic anemia  -Baseline Hgb ~7-8. S/P 2U PRBCs 3/17 after repeat Hgb 5.3, total of 6U transfuse  d since admission.  -Patient had significant blood loss from ostomy site 3/20, resulting in hemorraghic shock, improved with blood transfusion; hemostasis achieved after bleeding source identified and sutured. Another large vol danie bloody  output from osotomy on 3/21, bleeding source within ostomy site, sutured.    -Hgb dropping today 8.7, despite hypernatremia/being dry  -May still have slow GIB due to persistent uremia, GI previously consulted for EGD but was deferred due to hemodynamic instability at the time  -?Worsened by impaired marrow response liekly 2/2 persistent inflammation due to low retic index ~1 prior to most recent transfusions; normocytic with normal B12/folate levels; MCV<100. Iatrogenic cause likely contributing 2/2 frequent blood draws; chronic anemia likely persistent inflammatory state/CKD/lymphoma in setting of elevated ferritin of 974. SPEP/UPEP negative. Hemolysis workup negative.   Plan:  -Routine monitoring ostomy output, if bleeding, apply direct pressure and contact gen surg STAT for hemostasis .  -Continue tube feeds/nutritional support  -Trend CBC, transfuse Transfuse with leukoreduced and irradiated RBCs to maintain Hgb>7  -See plan under pancytopenia     Diagnosis: Thrombocytopenia  -Likely multifactorial including imparied production from marrow dysfunction in setting of persistent inflammation; RI low suggestive of hyperproliferation, hx of B-cell lymphoma, recent infections including persistent COVID, PNA treated, CMV negative. No known history of vWD/congenital disorders. No liver dysfunction; recent hepatic profile unremarkable. HIT workup negative, Hemolytic workup negative. No s/s DIC. No mechanical valves. ?Primary ITP.  Plan:  -Filgrastim 300mg x3 to aid in plt recovery  -Transfuse with leukoreduced and irradiated PLTs if count <20k due to increased risk of bleeding   -Goal >50k  -Hold Lovenox for DVT ppx, resume once Plt>50k  -See plan under pancytopenia      Diagnosis Lymphopenia with bandemia  -In setting of high dose steroids  -Severely depressed immunoglobulins inclding IgG, IgA, IgM  -At risk for further infections  -Filgrastim 300mg x3 to aid in plt recovery  -Contact and airborne  precautions    Diagnosis: B cell lymphoma  -Follows with heme/onc at [facility]  -S/P 6 cycles of chemotherapy in 20222  -Maintained on Rituxan for 2 year course PTA, started May 2022, last dose was 12/2024, treatment currently on hold in setting of persistent COVID-19 infection  Plan:  -Holding rituximab in setting of persistent COVID-19 infection, now resolved.  -Await anti-Rituximab ab to assess for drug induced hypersensitivity syndrome  ?resume rituxubmab pending clinical stability & anti-ritux ab status in consultation with heme-onc     DVT ppx: hold lovenox 2/2 thrombocytopenia     Endo:  Diagnosis: steroid hyperglycemia and diabetes mellitus type 2, A1c at 6.6 from 01/2024  -Goal -180  -Insulin gtt     ID:  Diagnosis: Recurrent bacterial pneumonia  - Patient has completed multiple treatment courses of remdesivir throughout hospitalization in addition to 7 days of ceftriaxone.  - BAL cultures in 2/2024 positive for MDR Pseudomonas and stenotrophomonas treated with 10d course of Levaquin  - CT C/A/P 3/10 with persistent groundglass opacities and changes suggestive of sequelae of COVID, prior infections.  Completed 10d course of cefepime for broad spectrum coveragge (completed (3/13)  -Repeat BAL cultures from 3/11 showing 4+ growth Stenotrophomonas maltophilia. Could be colonization given prior BAL growth of same, however due to recent intubation with prolonged corticosteroid theraphy, will begin treating as true pathogen. Patient otherwise remains afebrile, no leukocytosis. CRP with down trending.  Previously on Bactrim from 3/13 to 3/18, discontinued due to worsening ELIESER  Plan:  -S/P 14d Minocycline course to tx stenotrophomonas PNA, completed 3/27   -IV steroids as above     MSK/Skin:  Diagnosis: Midline incision wound with poor wound healing-  -General surgery performed staple removal of the patient's midline incision on 3/12 with some skin signs appreciated at the inferior aspect of the wound without  obvious pus drainage. Overnight 3/13, wound dehiscence progressed requiring general surgery reevaluation. Red/brown aspirate noted, fascia intact. Wet-to-dry dressings in place. General surgery following for next Epson wound care.  -Poor wound healing in setting of high-dose steroid therapy.  No  exam no obvious signs of infection at this time.   -S/P wound vac replacement 3/13 as per gen surgery. No active concerns for cellulitis.  Plan:  -Wound VAC management as per general surgery  -Wound care for peritracheostomy wound  -Abdominal wound care as per general surgery  -Routine monitoring     Diagnosis: Critical illness myopathy  -Likely exacerbated by high-dose steroid therapy  Plan:  -Continue to wean steroids as above  -Aggressive PT/OT         Disposition: Critical care    ICU Core Measures     Vented Patient  VAP Bundle  VAP bundle ordered     A: Assess, Prevent, and Manage Pain Has pain been assessed? Yes  Need for changes to pain regimen? NA   B: Both Spontaneous Awakening Trials (SATs) and Spontaneous Breathing Trials (SBTs) Plan to perform spontaneous awakening trial today? N/A   Plan to perform spontaneous breathing trial today? N/A   Obvious barriers to extubation? NA   C: Choice of Sedation RASS Goal: N/A patient not on sedation  Need for changes to sedation or analgesia regimen? NA   D: Delirium CAM-ICU: Negative   E: Early Mobility  Plan for early mobility? Yes   F: Family Engagement Plan for family engagement today? Yes       Antibiotic Review: Patient on appropriate coverage based on culture data.  and Infectious disease consulted    Review of Invasive Devices:    Ortiz Plan: voiding trial today        Prophylaxis:  VTE Contraindicated secondary to: Plt <50   Stress Ulcer  covered bypantoprazole (PROTONIX) injection 40 mg [[id number]]        Significant 24hr Events     24hr events:  Gen surg consulted for peg discussions. No PEG for now until abd wounds heal. Urology consulted for retention.      Overnight: started on insulin gtt due to uncontrolled BS despite lantus and SSI algo#3.      Subjective   Patient denies any pain or discomfort via head nodding.    Review of Systems   Unable to perform ROS: Acuity of condition        Objective                            Vitals I/O      Most Recent Min/Max in 24hrs   Temp 97.6 °F (36.4 °C) Temp  Min: 97.6 °F (36.4 °C)  Max: 99.1 °F (37.3 °C)   Pulse (!) 137 Pulse  Min: 124  Max: 137   Resp (!) 28 Resp  Min: 20  Max: 36   /80 BP  Min: 121/79  Max: 156/80   O2 Sat 97 % SpO2  Min: 95 %  Max: 100 %     LDA  Peripherals (R, L)    Ileostomy RUQ 36d, draining bilious o/p  Wound vac  NGT   Diana, day 3  Surgical airway , cuffed   Intake/Output Summary (Last 24 hours) at 3/29/2024 0755  Last data filed at 3/29/2024 0600  Gross per 24 hour   Intake 5380.41 ml   Output 3100 ml   Net 2280.41 ml       Diet Enteral/Parenteral; Tube Feeding No Oral Diet; Vital 1.5; Continuous; 45; Prosource Protein Liquid - One Packet; OD; Refugio - One Packet; BID; 300; Water; Every 4 hours    Invasive Monitoring           Physical Exam   Physical Exam  Eyes:      Pupils: Pupils are equal, round, and reactive to light.   Skin:     General: Skin is warm and dry.   HENT:      Nose: No epistaxis.      Mouth/Throat:      Mouth: Mucous membranes are moist.   Neck:      Trachea: Tracheostomy present.   Cardiovascular:      Rate and Rhythm: Regular rhythm. Tachycardia present.      Heart sounds: No murmur heard.  Musculoskeletal:         General: No swelling.      Right lower leg: No edema.      Left lower leg: No edema.   Abdominal: General: An ostomy site is present. There is ostomy site.     Palpations: Abdomen is soft.      Tenderness: There is no abdominal tenderness.      Comments: Wound vac   Constitutional:       Appearance: She is ill-appearing.      Interventions: She is not sedated and not intubated.  Pulmonary:      Effort: No accessory muscle usage, respiratory distress or accessory  muscle usage. She is not intubated.      Breath sounds: Normal breath sounds. No wheezing.   Neurological:      General: No focal deficit present.      Mental Status: She is alert. She is calm.      Comments: Able to follow commands with head nodding,   Weak  strength BUE and Unable to assess strength due to profound weakness in all extremities    Genitourinary/Anorectal:     Comments: Ortiz draining clear yellow urine  Ortiz present.          Diagnostic Studies      EKG: no new  Imaging: no new I have personally reviewed pertinent reports.       Medications:  Scheduled PRN   chlorhexidine, 15 mL, Q12H SARTHAK  Filgrastim-aafi, 300 mcg, Daily  insulin glargine, 10 Units, QAM  insulin lispro, 1-6 Units, HS  lacosamide, 100 mg, Q12H SARTHAK  levalbuterol, 1.25 mg, TID  magnesium sulfate, 4 g, Once  methylPREDNISolone sodium succinate, 30 mg, BID  [START ON 3/30/2024] methylPREDNISolone sodium succinate, 40 mg, Daily  modafinil, 100 mg, Daily  nystatin, , BID  pantoprazole, 40 mg, Q12H SARTHAK  polyethylene glycol, 17 g, Daily  sodium chloride, 2 spray, BID  sodium chloride, 4 mL, TID  topiramate, 50 mg, BID  venlafaxine, 12.5 mg, TID With Meals      acetaminophen, 650 mg, Q6H PRN  fentaNYL, 25 mcg, Q1H PRN  ondansetron, 4 mg, Q6H PRN  sodium chloride, 1 Application, Q1H PRN       Continuous    insulin regular (HumuLIN R,NovoLIN R) 1 Units/mL in sodium chloride 0.9 % 100 mL infusion, 0.3-21 Units/hr, Last Rate: 9 Units/hr (03/29/24 0602)           Labs:    CBC    Recent Labs     03/28/24  0532 03/29/24  0556   WBC 1.07* 1.15*   HGB 7.8* 7.6*   HCT 24.6* 23.9*   PLT 34* 22*   BANDSPCT 21*  --      BMP    Recent Labs     03/28/24  0532 03/29/24  0556   SODIUM 145 137   K 3.1* 3.5   * 110*   CO2 19* 18*   AGAP 12 9   BUN 62* 56*   CREATININE 0.83 0.69   CALCIUM 9.7 9.5       Coags    No recent results     Additional Electrolytes  Recent Labs     03/28/24  0532 03/29/24  0556   MG 2.0 1.7*   PHOS 3.9 3.2          Blood  Gas    No recent results  No recent results LFTs  No recent results      Infectious  No recent results     COVID ag 3/25 negative  COVID ag 3/27 negative    BAL 3/11   --4+ steno malto., 2+ candida  --Viral Cx neg  --Fungal 4+ candida  CMV neg  PCP smear neg  COVID pcr + on 3/11 Glucose  Recent Labs     03/28/24  0532 03/29/24  0556   GLUC 201* 257*               Joe Lazcano DO

## 2024-03-29 NOTE — CONSULTS
Consult - Urology   Carla Hunt 1965, 59 y.o. female MRN: [id number]    Unit/Bed#: MICU 12 Encounter: [id number]      Urinary retention  Assessment & Plan  Urology consulted for urinary retention   Patient failed urinary retention protocol   PVR showed 580 cc on 3/26  Marie Catheter inserted on 3/27/24 at 9:00 am  Maintain marie catheter  Continue to monitor patient's I/O  Patient will be scheduled for voiding trial in outpatient/rehab setting after discharge  No further inpatient urology services needed at this time.     I/O last 3 completed shifts:  In: 8232.8 [I.V.:2850; NG/GT:3425; IV Piggyback:200.8; Feedings:1757]  Out: 4635 [Urine:3935; Stool:700]  I/O this shift:  In: 2164 [I.V.:1185; NG/GT:740]  Out: 1385 [Urine:1185; Stool:200]    * Acute respiratory failure with hypoxia (HCC)  Assessment & Plan  Patient was re-intubated 3/11 with subsequent bedside trach on 3/12.   On trach collar during time of encounter.  Continue plan per primary care team.            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 Further, the patient is s/p tracheostomy (3/12/24). Today, the patient is being seen by urology for urinary retention and a failed voiding trial. A 2-way marie catheter was placed on 3/27/2024. Patient acknowledge me with a head nod but was unresponsive during encounter. The patient was drowsy in bed on O2 via trach collar. Patient did not appear to be in any acute distress at time of encounter. Review of systems and physical  "exam were limited due to patient's somnolence.     Review of Systems  Limited ROS due to patient being somnolent during encounter.     Objective:  Vitals: Blood pressure 119/61, pulse (!) 126, temperature 98.6 °F (37 °C), temperature source Axillary, resp. rate (!) 30, height 5' 8\" (1.727 m), weight 74 kg (163 lb 2.3 oz), SpO2 (!) 89%.,Body mass index is 24.81 kg/m².    Physical Exam  Constitutional:       General: She is not in acute distress.     Appearance: She is ill-appearing.   Neck:      Comments: Tracheostomy site appears CDI  Cardiovascular:      Rate and Rhythm: Tachycardia present.   Pulmonary:      Effort: No respiratory distress.      Comments: Trach collar O2  Abdominal:      General: Abdomen is flat.      Palpations: Abdomen is soft.      Tenderness: There is no guarding.      Comments: NGT in place receiving tube feeds    Genitourinary:     Comments: 2-way Ortiz catheter present with yellow urine draining to bag.  Neurological:      Comments: Patient responded to initial verbal stimuli and proceeded to shut her eyes. She did not respond to tactile stimuli.          Imaging:  CT chest abdomen pelvis wo contrast  Status: Final result     PACS Images     Show images for CT chest abdomen pelvis wo contrast  Study Result    Narrative & Impression   CT CHEST, ABDOMEN AND PELVIS WITHOUT IV CONTRAST     INDICATION: concern for infection.     COMPARISON: 3/6/2024.     TECHNIQUE: CT examination of the chest, abdomen and pelvis was performed without intravenous contrast. Multiplanar 2D reformatted images were created from the source data.     This examination, like all CT scans performed in the [facility], was performed utilizing techniques to minimize radiation dose exposure, including the use of iterative reconstruction and automated exposure control. Radiation dose length   product (DLP) for this visit: 549.64 mGy-cm     Enteric Contrast: Not administered.     FINDINGS:     CHEST     LUNGS: " Extensive groundglass and nodular consolidation throughout both lung fields with more focal peripheral opacification particularly at the lung bases. Extensive regions of bronchiectasis most notable at the lung bases.     PLEURA: Unremarkable.     HEART/GREAT VESSELS: Heart is unremarkable for patient's age. No thoracic aortic aneurysm with top normal size of the ascending thoracic aorta measuring 39 mm at the level of the left main pulmonary artery.     MEDIASTINUM AND KIRBY: Unremarkable.     CHEST WALL AND LOWER NECK: Left central line terminates at the caval atrial junction.     ABDOMEN     LIVER/BILIARY TREE: Unremarkable.     GALLBLADDER: Vicarious excretion.     SPLEEN: Unremarkable.     PANCREAS: Unremarkable.     ADRENAL GLANDS: Unremarkable.     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 Mild right renal fullness as before without distal obstructive pathology.     STOMACH AND BOWEL: Nasogastric tube terminates at the gastroduodenal junction.     Right lower quadrant ileostomy with a patent stoma after ileocolectomy. Diverticular disease also noted without diverticulitis. Edema and small foci of subcutaneous emphysema after recent intervention. No drainable collection.     APPENDIX: No findings to suggest appendicitis.     ABDOMINOPELVIC CAVITY: No ascites. No pneumoperitoneum. No lymphadenopathy.     VESSELS: Unremarkable for patient's age.     PELVIS     REPRODUCTIVE ORGANS: Unremarkable for patient's age.     URINARY BLADDER: Decompressed with a Ortiz catheter in place.     ABDOMINAL WALL/INGUINAL REGIONS: Unremarkable.     BONES: No acute fracture or suspicious osseous lesion.     IMPRESSION:  1. Persistent bilateral groundglass and nodular consolidation with peripheral opacification at the right greater than left lung bases. Findings remain concerning for multi lobar infiltrates with possible superimposed COVID-19  opacities.  2. Status post ileocolectomy with a right lower quadrant ileostomy again exhibiting a patent stoma. Persistent inflammatory change and subcutaneous emphysema after recent intervention. No drainable collection.  3. Bilateral renal calcifications again suggestive of medullary sponge kidney with persistent mild right renal fullness but no evidence of distal obstructive pathology.     Imaging reviewed - both report and images personally reviewed.     Labs:  Recent Labs     03/27/24  0545 03/28/24  0532 03/29/24  0556   WBC 0.91* 1.07* 1.15*     Recent Labs     03/27/24  0545 03/28/24  0532 03/29/24  0556   HGB 7.7* 7.8* 7.6*       Recent Labs     03/26/24  2046 03/27/24  0545 03/28/24  0532 03/29/24  0556   CREATININE 0.81 0.83 0.83 0.69       Microbiology:  03/11/2024 1306 03/18/2024 1452 Fungal culture [[id number]]    (Abnormal)   Lung, Left Lower Lobe Bronchial Washing    Final result [facility]  Component Value   Fungus (Mycology) Culture 4+ Growth of Candida albicans Abnormal     This organism has been edited. The previous result was Yeast species on 3/18/2024 at 1310 EDT.        03/11/2024 1306 03/20/2024 1133 Bronchial culture and Gram stain [[id number]]     (Abnormal)   Lung, Left Lower Lobe Bronchial Washing    Edited Result - FINAL [facility] MINOCYCLINE ETEST [1.5] = Susceptible Component Value   Bronchial Culture 4+ Growth of Stenotrophomonas maltophilia Abnormal     2+ Growth of Candida albicans Abnormal     2+ Growth of    Mixed Respiratory lauren   Gram Stain Result 3+ Polys Abnormal     3+ Gram variable rods Abnormal     1+ Budding Yeast with Pseudomycelia Abnormal        Susceptibility    Stenotrophomonas maltophilia (1)    Antibiotic Interpretation Microscan  Method Status    ZID Performed  Yes  HARRY Final    Ceftazidime ($$) Resistant >16 ug/ml HARRY Final    Trimethoprim + Sulfamethoxazole ($$$) Susceptible 1/19 ug/ml  HARRY Final              History:  Social History     Socioeconomic History    Marital status: /Civil Union     Spouse name: None    Number of children: None    Years of education: None    Highest education level: None   Occupational History    None   Tobacco Use    Smoking status: Never    Smokeless tobacco: Never   Vaping Use    Vaping status: Never Used   Substance and Sexual Activity    Alcohol use: Not Currently    Drug use: Never    Sexual activity: None   Other Topics Concern    None   Social History Narrative    None     Social Determinants of Health     Financial Resource Strain: Not on file   Food Insecurity: No Food Insecurity (2024)    Hunger Vital Sign     Worried About Running Out of Food in the Last Year: Never true     Ran Out of Food in the Last Year: Never true   Transportation Needs: No Transportation Needs (2024)    PRAPARE - Transportation     Lack of Transportation (Medical): No     Lack of Transportation (Non-Medical): No   Physical Activity: Not on file   Stress: Not on file   Social Connections: Not on file   Intimate Partner Violence: Not on file   Housing Stability: Low Risk  (2024)    Housing Stability Vital Sign     Unable to Pay for Housing in the Last Year: No     Number of Places Lived in the Last Year: 1     Unstable Housing in the Last Year: No       Past Medical History:   Diagnosis Date    Hx of hypercalcemia     Lymphoma (HCC) 2021    Parathyroid disease (HCC)     Seizures (HCC)     Situational depression     24 yr old son  from drug overdose     Past Surgical History:   Procedure Laterality Date    CRANIOTOMY FOR TEMPORAL LOBECTOMY Left     SC LAPS ABD PRTM&OMENTUM DX W/WO SPEC BR/WA SPX N/A 2024    Procedure: LAPAROSCOPY DIAGNOSTIC,EXPLORATORY LAPAROTOMY, RIGHT KARY COLECTOMY,ILEOSTOMY, MUCUS FISTULA;  Surgeon: Lauryn Ullrich, DO;  Location: [facility];  Service: General     Family History   Problem Relation Age of Onset    Diabetes Mother      Cancer Father        Yadiel Ordonez PA-C  Date: 3/29/2024 Time: 3:12 PM

## 2024-03-29 NOTE — PLAN OF CARE
Problem: OCCUPATIONAL THERAPY ADULT  Goal: Performs self-care activities at highest level of function for planned discharge setting.  See evaluation for individualized goals.  Description: Treatment Interventions: ADL retraining, Functional transfer training, UE strengthening/ROM, Endurance training, Patient/family training, Equipment evaluation/education, Compensatory technique education, Continued evaluation, Energy conservation, Activityengagement          See flowsheet documentation for full assessment, interventions and recommendations.   Outcome: Progressing  Note: Limitation: Decreased ADL status, Decreased UE ROM, Decreased UE strength, Decreased Safe judgement during ADL, Decreased cognition, Decreased endurance, Decreased self-care trans, Decreased high-level ADLs, Non-func R UE, Non-func L UE  Prognosis: Fair, Poor  Assessment: Patient participated in Skilled OT session 3/29/2024 with interventions consisting of Energy Conservation techniques, deep breathing technique, safety awareness and fall prevention techniques,  therapeutic activities to: increase activity tolerance, increase standing tolerance time with unilateral UE support to complete sink level ADLs, increase postural control, and increase trunk control . Patient agreeable to OT treatment session, upon arrival patient was found supine in bed.  In comparison to previous session, patient with improvements in EOB sitting tolerance, head/neck control, visual tracking/alertness, and transfers. Patient requiring frequent re direction, verbal cues for safety, verbal cues for correct technique, verbal cues for pacing thru activity steps, and frequent rest periods. Patient continues to be functioning below baseline level, occupational performance remains limited secondary to factors listed above and increased risk for falls and injury.   From OT standpoint, recommendation at time of d/c would be post acute rehab. Patient to benefit from continued  Occupational Therapy treatment while in the hospital to address deficits as defined above and maximize level of functional independence with ADLs and functional mobility.     Rehab Resource Intensity Level, OT: II (Moderate Resource Intensity)        Felisha Wilkerson MS, OTR/L

## 2024-03-29 NOTE — UTILIZATION REVIEW
Continued Stay Review    Date: 3/29/24                          Current Patient Class: inpatient  Current Level of Care:   HPI:59 y.o. female initially admitted on 1/10/24     Assessment/Plan:   Acute respiratory failure with hypoxia. Tachycardic & tachypneic. Trach in place, O2 @ 8ltr via trach mask. Continue slow IV steroid wean. Remains on tube feedings. Surgery consulted for PEG placement. Plan no PEG until abd wounds healed, wound vac in place. Ileostomy RUQ 36d, draining bilious o/p. Started on insulin gtt overnight due to uncontrolled BS despite lantus and SSI algo#3.       Diet Enteral/Parenteral; Tube Feeding No Oral Diet; Vital 1.5; Continuous; 45; Prosource Protein Liquid - One Packet; OD; Refugio - One Packet; BID; 300; Water; Every 4 hours     Vital Signs:   Date/Time Temp Pulse Resp BP MAP (mmHg) SpO2 FiO2 (%) Calculated FIO2 (%) - Nasal Cannula O2 Flow Rate (L/min) Nasal Cannula O2 Flow Rate (L/min) O2 Device Patient Position - Orthostatic VS   03/29/24 1200 98.6 °F (37 °C) 118 Abnormal  25 Abnormal  119/61 82 91 % -- -- -- -- -- --   03/29/24 1000 -- 125 Abnormal  26 Abnormal  133/62 89 94 % -- -- -- -- -- --   03/29/24 0831 -- -- -- -- -- 96 % -- 52 8 L/min 8 L/min Trach mask --   03/29/24 0800 98.6 °F (37 °C) 133 Abnormal  28 Abnormal  142/74 98 96 % -- -- -- -- Trach mask --   03/29/24 0600 -- 137 Abnormal  28 Abnormal  156/80 112 97 % -- -- -- -- -- --   03/29/24 0500 -- 133 Abnormal  23 Abnormal  151/69 99 98 % -- -- -- -- -- --   03/29/24 0400 97.6 °F (36.4 °C) 133 Abnormal  26 Abnormal  152/81 110 98 % -- -- -- -- Venturi mask Lying   03/29/24 0300 -- 130 Abnormal  21 151/77 107 98 % -- -- -- -- -- --   03/29/24 0200 -- 126 Abnormal  23 Abnormal  146/80 106 99 % -- -- -- -- -- --   03/29/24 0100 -- 126 Abnormal  22 151/75 106 98 % -- -- -- -- -- --   03/29/24 0000 99.1 °F (37.3 °C) 124 Abnormal  21 144/79 105 98 % -- -- -- -- -- --     Pertinent Labs/Diagnostic Results:       Results from last 7  days   Lab Units 03/29/24  0556 03/28/24  0532 03/27/24  0545 03/26/24  0548 03/25/24  0835 03/25/24  0523   WBC Thousand/uL 1.15* 1.07* 0.91* 0.90* 1.20* 1.31*   HEMOGLOBIN g/dL 7.6* 7.8* 7.7* 8.3* 8.8* 8.7*   HEMATOCRIT % 23.9* 24.6* 24.8* 26.8* 27.8* 27.6*   PLATELETS Thousands/uL 22* 34* 43* 27* 38* 40*   NEUTROS ABS Thousands/µL  --   --   --   --  0.94*  --    TOTAL NEUT ABS Thousand/uL  --  0.88*  --   --   --  1.07*   BANDS PCT % 39* 21* 46* 32*  --  30*     Results from last 7 days   Lab Units 03/25/24  0523   RETIC CT ABS  28,400   RETIC CT PCT % 1.01     Results from last 7 days   Lab Units 03/29/24  0556 03/28/24  0532 03/27/24  0545 03/26/24  2046 03/26/24  1438 03/26/24  0548 03/25/24  1234 03/25/24  0523   SODIUM mmol/L 137 145 146 144 142 152*   < > 156*   POTASSIUM mmol/L 3.5 3.1* 4.0 3.8 4.2 3.8   < > 4.3   CHLORIDE mmol/L 110* 114* 114* 115* 115* 121*   < > 124*   CO2 mmol/L 18* 19* 20* 20* 20* 20*   < > 23   ANION GAP mmol/L 9 12 12 9 7 11   < > 9   BUN mg/dL 56* 62* 56* 54* 57* 63*   < > 72*   CREATININE mg/dL 0.69 0.83 0.83 0.81 0.80 0.79   < > 0.89   EGFR ml/min/1.73sq m 96 77 77 80 81 82   < > 71   CALCIUM mg/dL 9.5 9.7 9.3 8.9 8.5 9.1   < > 9.4   MAGNESIUM mg/dL 1.7* 2.0 2.0  --   --  2.1  --  2.4   PHOSPHORUS mg/dL 3.2 3.9 4.6*  --   --  3.9  --  3.7    < > = values in this interval not displayed.     Results from last 7 days   Lab Units 03/25/24  0523 03/24/24  0437 03/23/24  0519   AST U/L  --  13 21   ALT U/L  --  41 40   ALK PHOS U/L  --  115* 98   TOTAL PROTEIN g/dL  --  5.0* 4.6*   ALBUMIN g/dL  --  2.5* 2.3*   TOTAL BILIRUBIN mg/dL 0.53 0.43 0.45     Results from last 7 days   Lab Units 03/29/24  0813 03/29/24  0602 03/29/24  0404 03/29/24  0203 03/29/24  0020 03/28/24  2208 03/28/24  2003 03/28/24  1732 03/28/24  1203 03/28/24  1001 03/28/24  0807 03/28/24  0626   POC GLUCOSE mg/dl 152* 244* 245* 235* 245* 296* 338* 311* 216* 195* 184* 199*     Results from last 7 days   Lab Units  03/29/24  0556 03/28/24  0532 03/27/24  0545 03/26/24  2046 03/26/24  1438 03/26/24  0548 03/26/24  0026 03/25/24  1234 03/25/24  0523 03/24/24  0437 03/23/24  0519   GLUCOSE RANDOM mg/dL 257* 201* 263* 179* 202* 179* 116 177* 152* 154* 194*       Results from last 7 days   Lab Units 03/27/24  0555   UNIT PRODUCT CODE  [id number]   UNIT NUMBER  [id number]   UNITABO  A   UNITRH  POS   UNIT DISPENSE STATUS  Presumed Trans   UNIT PRODUCT VOL mL 300     Results from last 7 days   Lab Units 03/25/24  0523 03/23/24  0519   CRP mg/L 256.9* 249.3*       Results from last 7 days   Lab Units 03/28/24  0532 03/25/24  0523   TOTAL COUNTED  100 100       Medications:   Scheduled Medications:  chlorhexidine, 15 mL, Mouth/Throat, Q12H SARTHAK  Filgrastim-aafi, 300 mcg, Subcutaneous, Daily  insulin glargine, 10 Units, Subcutaneous, QAM  insulin lispro, 1-6 Units, Subcutaneous, HS  lacosamide, 100 mg, Oral, Q12H SARTHAK  levalbuterol, 1.25 mg, Nebulization, TID  magnesium sulfate, 4 g, Intravenous, Once  methylPREDNISolone sodium succinate, 30 mg, Intravenous, BID  [START ON 3/30/2024] methylPREDNISolone sodium succinate, 40 mg, Intravenous, Daily  modafinil, 100 mg, Per NG Tube, Daily  nystatin, , Topical, BID  pantoprazole, 40 mg, Intravenous, Q12H SARTHAK  polyethylene glycol, 17 g, Per NG Tube, Daily  sodium chloride, 2 spray, Each Nare, BID  sodium chloride, 4 mL, Nebulization, TID  topiramate, 50 mg, Per PEG Tube, BID  venlafaxine, 12.5 mg, Oral, TID With Meals    Continuous IV Infusions:  insulin regular (HumuLIN R,NovoLIN R) 1 Units/mL in sodium chloride 0.9 % 100 mL infusion, 0.3-21 Units/hr, Intravenous, Titrated    PRN Meds:  acetaminophen, 650 mg, Oral, Q6H PRN  fentaNYL, 25 mcg, Intravenous, Q1H PRN  ondansetron, 4 mg, Intravenous, Q6H PRN  sodium chloride, 1 Application, Nasal, Q1H PRN    Discharge Plan: tbd    Network Utilization Review Department  ATTENTION: Please call with any questions or concerns to [phone] and  carefully listen to the prompts so that you are directed to the right person. All voicemails are confidential.   For Discharge needs, contact Care Management DC Support Team at [phone] opt. 2  Send all requests for admission clinical reviews, approved or denied determinations and any other requests to dedicated fax number below belonging to the campus where the patient is receiving treatment. List of dedicated fax numbers for the Facilities:  FACILITY NAME UR FAX NUMBER   ADMISSION DENIALS (Administrative/Medical Necessity) [phone]   DISCHARGE SUPPORT TEAM (NETWORK) [phone]   PARENT CHILD HEALTH (Maternity/NICU/Pediatrics) [phone]   [facility] [phone]   [facility] [phone]   [facility] [phone]   [facility] [phone]   [facility] [phone]   [facility] [phone]   [facility] [phone]   [facility] [phone]   [facility] [phone]   [facility] [phone]   [facility] [phone]   [facility] [place] [phone]

## 2024-03-29 NOTE — DISCHARGE INSTR - AVS FIRST PAGE
Marie Cath Care instructions---Maintain marie catheter to straight drainage. May use leg bag and shower. May flush TID prn using Munir syringe and 120 ml NSS. May use more saline ad zenon to prevent/treat cath obstruction. Remove catheter on 8th day rehab by 0600 hrs. Bladder scan if no void or less than 200ml in 6hrs. Straight cath for bladder scan >350ml and repeat process. If patient requires straight cath x3 , re-insert marie and call for Urologist follow-up. Information above. Marie can remain in place for up to 4 weeks at a time. Marie placed at [facility] on 3/29

## 2024-03-29 NOTE — TELEPHONE ENCOUNTER
Patient was seen by urology in-patient setting for acute urinary retention. Patient failed urinary retention protocol and required marie catheterization. Follow-up after discharge with urology in out patient setting for trial of void. If at rehab please provide trial of void instructions for rehab team.

## 2024-03-29 NOTE — PHYSICAL THERAPY NOTE
PT Treatment       03/29/24 1445   PT Last Visit   PT Visit Date 03/29/24   Note Type   Note Type Treatment   Pain Assessment   Pain Rating: FLACC (Rest) - Face 0   Pain Rating: FLACC (Rest) - Legs 0   Pain Rating: FLACC (Rest) - Activity 0   Pain Rating: FLACC (Rest) - Cry 0   Pain Rating: FLACC (Rest) - Consolability 0   Score: FLACC (Rest) 0   Pain Rating: FLACC (Activity) - Face 1   Pain Rating: FLACC (Activity) - Legs 0   Pain Rating: FLACC (Activity) - Activity 0   Pain Rating: FLACC (Activity) - Cry 0   Pain Rating: FLACC (Activity) - Consolability 0   Score: FLACC (Activity) 1   Restrictions/Precautions   Weight Bearing Precautions Per Order No   Braces or Orthoses Other (Comment)  (B resting hand splints and Prevalon boots)   Other Precautions Airborne/isolation;Contact/isolation;Cognitive;Chair Alarm;Bed Alarm;Aspiration;Multiple lines;Telemetry;O2;Fall Risk   General   Chart Reviewed Yes   Response to Previous Treatment Patient with no complaints from previous session.   Family/Caregiver Present Yes   Cognition   Overall Cognitive Status Impaired   Arousal/Participation Arousable;Persistent stimuli required   Attention Difficulty attending to directions   Orientation Level Unable to assess   Memory Unable to assess   Following Commands Follows one step commands inconsistently   Subjective   Subjective Agreeable to PT   Bed Mobility   Supine to Sit 2  Maximal assistance   Additional items Assist x 2;LE management;Other  (trunk support)   Sit to Supine 2  Maximal assistance   Additional items Assist x 2;LE management;Other  (trunk support)   Additional Comments pt received supine in bed and left in bed at end of eval   Transfers   Sit to Stand 2  Maximal assistance   Additional items Assist x 3;Verbal cues   Stand to Sit 2  Maximal assistance   Additional items Assist x 3;Verbal cues   Additional Comments w/ b/l HHA, knee blocking, and support at ischial tuberosities; +1 behind to support full trunk  extension   Ambulation/Elevation   Gait pattern Not appropriate   Balance   Static Sitting Poor   Dynamic Sitting Poor -   Static Standing Poor -   Endurance Deficit   Endurance Deficit Yes   Endurance Deficit Description limited due to fatigue   Activity Tolerance   Activity Tolerance Patient limited by fatigue;Treatment limited secondary to medical complications (Comment)   Medical Staff Made Aware OT   Nurse Made Aware yes   Exercises   Hip Flexion Sitting;10 reps;AAROM;Bilateral   Heel Cord Stretch Sitting;PROM;10 reps;Bilateral   Neuro re-ed sitting EOB 10 mins w/ max assist x 1 and UE support; tolerated 30 secs of standing w/ max assist x 3   Assessment   Prognosis Fair   Problem List Decreased strength;Decreased range of motion;Decreased endurance;Impaired balance;Decreased mobility;Decreased cognition;Decreased coordination;Impaired judgement;Decreased safety awareness;Decreased skin integrity;Pain   Assessment Pt agreeable to PT session. PT session focused on bed mobility, transfers, and neuro-reeducation. Pt did tolerate treatment well today. Pt was able to perform bed mobility / max assist x 2 (trunk support and LE management provided). Pt was able to sit EOB w/ max assist x 1 and performed UE and LE exercises. Pt was able to perform 2 sit to stands w/ max assist x 3 (HHA x 2, b/l knee blocking and support at ischial tuberosities, +1 behind for full trunk extension). Able to tolerate standing longer at today's session, 30 seconds. Pt would continue to benefit from skilled PT. Pt will continue to be seen upon admission. Pt's prognosis is Fair secondary to age, PLOF, medical complications, and progress so far. The patient's AM-PAC Basic Mobility Inpatient Standardized Score is less than 42.9, suggesting this patient may benefit from discharge to post-acute rehabilitation services. Please also refer to the recommendation of the Physical Therapist for safe discharge planning.   Goals   Patient Goals none  stated today   LTG Expiration Date 04/02/24   Plan   Treatment/Interventions Functional transfer training;LE strengthening/ROM;Therapeutic exercise;Endurance training;Equipment eval/education;Bed mobility;Spoke to nursing;OT;Family   Progress Slow progress, decreased activity tolerance   PT Frequency 2-3x/wk   Discharge Recommendation   Rehab Resource Intensity Level, PT I (Maximum Resource Intensity)   AM-PAC Basic Mobility Inpatient   Turning in Flat Bed Without Bedrails 1   Lying on Back to Sitting on Edge of Flat Bed Without Bedrails 1   Moving Bed to Chair 1   Standing Up From Chair Using Arms 1   Walk in Room 1   Climb 3-5 Stairs With Railing 1   Basic Mobility Inpatient Raw Score 6   Turning Head Towards Sound 2   Follow Simple Instructions 2   Low Function Basic Mobility Raw Score  10   Low Function Basic Mobility Standardized Score  14.65   [facility] Highest Level Of Mobility   -HLM Goal 2: Bed activities/Dependent transfer   -HLM Achieved 3: Sit at edge of bed     Karlene Mata, SPT

## 2024-03-29 NOTE — ASSESSMENT & PLAN NOTE
S/p PE Ortiz catheter insertion 3/27/2024 urinary retention  Voiding trial and outpatient/rehab setting when medically stabilized  Monitor I&O  Creat 1.63

## 2024-03-29 NOTE — SPEECH THERAPY NOTE
Speech-Language Pathology Progress Note      Patient Name: Carla Hunt    Today's Date: 3/29/2024      Subjective:  Pt was lethargic with waxing/waning mentation during session. She was sitting upright in bed.     Objective:  Pt was seen for session #1 of VitalStim treatment. Anterior neck was cleansed and dry prior to placement of electrodes. Electrode placement 3b was used with goal of targeting pharyngeal constrictor muscles. PMV worn for session. While oral care was completed, VitalStim unit was powered on and gradually advanced to 8.0mA for about 10 min. Pt showed no signs of distress and stim was increased to 13.0mA. PO trials were minimized d/t high risk for aspiration. Intermittently over the course of 50 min pt was given approx 6-8 tsp sips of NTL water and encouraged to swallow hard. Delayed cough noted with all trials. Oral suction over the base of the tongue was productive of some thick mucous. Towards the end of the session SpO2 dropped to the low 80s and trach suction was provided. PMv kept off at that time and PO trials discontinued.     Assessment:  Pt tolerated VitalStim well and appeared in no discomfort. Slight redness after removal of electrodes and skin was again wiped clean.     Plan:  Continue ST f/u. Subsequent sessions to focus on continuation of NMES.

## 2024-03-29 NOTE — PROGRESS NOTES
Critical Care Attending Note; Bharat Márquez   Note Date: 24  Note Time: 8:29 AM    Patient: Carla Hunt  Age, : 59 y.o., 1965 MRN: [id number] Code Status: Level 1 - Full Code Patient Location: MICU 12/MICU 12   Hospital LOS:79 days  ]   Patient seen and examined, medical record reviewed, discussed with house staff and nursing staff.     HPI   CC: Respiratory Failure   59F with a PMH of B - Cell Lymphoma(Bendamustine-Rituximab x 6 cycles [21 - 1/3/22], maintenance Rituximab), Epilepsy(Temporal Lobe resection- Complex Migraines) originally admitted for AMS found to have COVID, she has had a very complicated course requiring multiple intubations, volvulus(Ex Lap - ileostomy - wound dehiscence), hemorrhagic shock,  PNAs.    Key Recent Events   : [facility] - AMS, COVID - Steroid protocol  1/10: Transfer [facility] Video EEG  : LP   1/15: LP  : Bronch  : Pulse Dose steroids - 3 days   Intubated - Epistaxis  2/15: IVIG Completed  : Bronch - COVID + Antigen/PCR  : Volvulus - ex lap, ileocecectomy, end ileostomy and mucus fistula creation   :  Bronch   : Bronch  3/1: Extubated  3/11: Re-intubated, Bronch - Stenotrophomonas/Pseudo  3/12: Bronch  3/12: Trach  3/13: BAL - Stenotrophomonas   3/15: 14d course Paxlovid/remdesivir  3/17: Bronch  3/20: Hemorrhagic shock - Ostomy - 2PRBCs - OR -   3/21: Hemorrhage Ostomy/WV- 1 PRBCs - OR -  3/25: No acute events overnight      Main ICU Plans:       #Neuro  Anxiety/Alertness  - Venlanfaxine  - Modafinil    Seizure disorder  - Vimpat, Topimax  - Neurology Consult     #Pulm  Hypoxic Respiratory Failure - COVID, Pneumonitis, Rituximab - Improved but failed weaning steroids. Extended steroid course and has been on steroids for > 1 month. Plan for slower wean from steroids to monitor for improvement   - TCT daily  - Wean  Solumedrol 30 q12hr - 3/30 wean to 40mg qday     Pulmonary Hygiene   - Start Chest PT, Hypertonic  Saline    #Renal  Hypernatremia - Resolved    Urinary Retention -   - Ortiz - Failed voiding trial  - Discontinued Ipratropium     #GI  Volvulus -   - General surgery following  - Ostomy/Wound Care    Severe Protein Calorie malnutrition - Patient unlikely to maintain enough oral intake to keep up with calorie demand  - Recommend PEG once stable - will discuss with surgery, once neutrophil count improves likely can move forward  - Swallow eval - NMS   - Aggressive PT/OT      #ID   Stenotrophomonas PNA  - ID Consulted - Minocycline - Completed 14 day course  - Bactrim PPX     COVID PNA - Extended steroid/antiviral course requiring pulse dose steroids with improvement in respiratory function. Treatment difficult due to underlying immunosuppression  - Steroids as above      #MSK  Steroid/Critical Care Neuropathy  - PT/OT     #Heme  Anemia - concerns for PUD(no melena, BRPR, hematemesis), maybe due to BM suppression  - GI Consulted - holding off on EGD     Thrombocytopenia/Neutropenia - Likely consumptive/BM suppression - possibly due to anti-epileptics(transitioned off Lamictal), doxycycline  - Heme consulted - Filgastrum  - PLT >20    #Endo  DM  - insulin gtt    #DVT/GI ppx  Hold SQH     #Lines/Tubes/Drains:   Invasive Devices       Peripheral Intravenous Line  Duration             Long-Dwell Peripheral IV (Midline) 03/08/24 Left Cephalic Vein 20 days    Peripheral IV 03/26/24 Right;Ventral (anterior) Forearm 3 days              Drain  Duration             Ileostomy RUQ 37 days    NG/OG/Enteral Tube Nasogastric 14 Fr Right nare 23 days    Urethral Catheter Double-lumen 2 days              Airway  Duration             Surgical Airway Shiley Cuffed 16 days                    #Nutrition:   Diet Enteral/Parenteral; Tube Feeding No Oral Diet; Vital 1.5; Continuous; 45; Prosource Protein Liquid - One Packet; OD; Refugio - One Packet; BID; 300; Water; Every 4 hours        #Code Status:   Level 1 - Full Code    #Dispo:  "  ICU        Bharat Márquez MD  Pulmonary, Critical Care    Critical care time, excluding procedures, teaching, family meetings, and excludes any prior time recorded by the AP/resident, 35 minutes. Upon my evaluation, this patient has a high probability of imminent or life-threatening deterioration due to above problems which required my direct attention, intervention, and personal management.   Impression/Active Problems:    Acute hypoxic respiratory failure ventilator dependent- s/p tracheostomy  Bilateral ground glass opacities- likely persistent PNA and superimposed bacterial infection  Persistent COVID Pna with superimposed bacterial pna- w/ stenotrophomonas  Acute blood loss anemia- from stoma site  Hemorrhagic shock  Severe encephalopathy- possibly due to uremia  Cutaneous ulcer- below the trach site, likely due to pressure.   Stenotrophomonas PNA  Sepsis- 2/2 persistent pneumonia  Volvulus- post hemicolectomy s/p cecal ostomy. Noted stomal retraction  Shock- unclear etiology  Thrombocytopenia  Bcell lymphoma- on rituximab  Steroid induced hyperglycemia  Minimal SAH POA  Seizure disorder  Upper extremity thrombophlebitis  Severe lymphopenia  Hx of migraines- s/p left temporal lobectomy  Hypogammaglobulinemia  Critical illness myopathy      Physical Exam:     Vital Signs:   Weight: 74 kg (163 lb 2.3 oz)  IBW: Ideal body weight: 63.9 kg (140 lb 14 oz)  Adjusted ideal body weight: 67.9 kg (149 lb 12.5 oz)  Temp:  [97.6 °F (36.4 °C)-99.1 °F (37.3 °C)] 98.6 °F (37 °C)  HR:  [124-137] 133  Resp:  [20-36] 28  BP: (121-156)/(67-81) 142/74  FiO2 (%):  [35-95] 95  Physical Exam: Unchanged  General: NAD  Neuro: Alert, following commands  Heart: RRR  Lungs: Basilar crackles  Abdomen: Midline - Wound vac - CDI, Ostomy, NT  Extremities: Edema                Ventilator Settings:    FiO2 (%):  [35-95] 95          Invalid input(s): \"PCO2\", \"O2\"    Radiologic Images Reviewed:    CXR  Mild prominent interstitial markings. No " pneumothorax or pleural effusion.     Normal cardiomediastinal silhouette.     Bones are unremarkable for age.     Normal upper abdomen.     IMPRESSION:     Mild congestive changes.    CT C/A/P  IMPRESSION:  1. Persistent bilateral groundglass and nodular consolidation with peripheral opacification at the right greater than left lung bases. Findings remain concerning for multi lobar infiltrates with possible superimposed COVID-19 opacities.  2. Status post ileocolectomy with a right lower quadrant ileostomy again exhibiting a patent stoma. Persistent inflammatory change and subcutaneous emphysema after recent intervention. No drainable collection.  3. Bilateral renal calcifications again suggestive of medullary sponge kidney with persistent mild right renal fullness but no evidence of distal obstructive pathology.        Input / Output:     Intake/Output Summary (Last 24 hours) at 3/29/2024 0829  Last data filed at 3/29/2024 0814  Gross per 24 hour   Intake 5329.69 ml   Output 3550 ml   Net 1779.69 ml            Infusions:  insulin regular (HumuLIN R,NovoLIN R) 1 Units/mL in sodium chloride 0.9 % 100 mL infusion, 0.3-21 Units/hr, Last Rate: 4 Units/hr (03/29/24 0814)        Scheduled Medications:  Current Facility-Administered Medications   Medication Dose Route Frequency Provider Last Rate    acetaminophen  650 mg Oral Q6H PRN Moises Bowden MD      chlorhexidine  15 mL Mouth/Throat Q12H [facility] Moises Bowden MD      fentaNYL  25 mcg Intravenous Q1H PRN Amdandre Lazcano, DO      Filgrastim-aafi  300 mcg Subcutaneous Daily Elena Ragab, DO      insulin glargine  10 Units Subcutaneous QAM Elena Ragab, DO      insulin lispro  1-6 Units Subcutaneous HS Joe Lazcano, DO      insulin regular (HumuLIN R,NovoLIN R) 1 Units/mL in sodium chloride 0.9 % 100 mL infusion  0.3-21 Units/hr Intravenous Titrated Ajit Oquendo MD 4 Units/hr (03/29/24 0814)    lacosamide  100 mg Oral Q12H SARTHAK Preciado PA-C      levalbuterol  1.25 mg  "Nebulization TID Moises Bowden MD      magnesium sulfate  4 g Intravenous Once Joe Lazcano DO 4 g (03/29/24 0823)    methylPREDNISolone sodium succinate  30 mg Intravenous BID Joe Lazcano DO      [START ON 3/30/2024] methylPREDNISolone sodium succinate  40 mg Intravenous Daily Joe Lazcano DO      modafinil  100 mg Per NG Tube Daily Joe Lazcano DO      nystatin   Topical BID Moises Bowden MD      ondansetron  4 mg Intravenous Q6H PRN Moises Bowden MD      pantoprazole  40 mg Intravenous Q12H SARTHAK Moises Bowden MD      polyethylene glycol  17 g Per NG Tube Daily Moises Bowden MD      sodium chloride  1 Application Nasal Q1H PRN Moiess Bowden MD      sodium chloride  2 spray Each Nare BID Emilie Soyeon Kim, MD      sodium chloride  4 mL Nebulization TID Moises Bowden MD      topiramate  50 mg Per PEG Tube BID Moises Bowden MD      venlafaxine  12.5 mg Oral TID With Meals Moises Bowden MD         PRN Medications:    acetaminophen    fentaNYL    ondansetron    sodium chloride    Labs Reviewed:  Results from last 7 days   Lab Units 03/29/24  0556 03/28/24  0532 03/27/24  0545   WBC Thousand/uL 1.15* 1.07* 0.91*   HEMOGLOBIN g/dL 7.6* 7.8* 7.7*   HEMATOCRIT % 23.9* 24.6* 24.8*   PLATELETS Thousands/uL 22* 34* 43*      Results from last 7 days   Lab Units 03/29/24  0556 03/28/24  0532 03/27/24  0545   SODIUM mmol/L 137 145 146   CO2 mmol/L 18* 19* 20*   BUN mg/dL 56* 62* 56*   CALCIUM mg/dL 9.5 9.7 9.3   MAGNESIUM mg/dL 1.7* 2.0 2.0   PHOSPHORUS mg/dL 3.2 3.9 4.6*         Invalid input(s): \"ASTSGOT\", \"ALTSGPT\"LABRCNTIP@ ,alkphos:3,tbilirubin:3,dbilirubin:3)@              Invalid input(s): \"TROPT\", \"PBNP\"             I have personally seen and examined the patient on (03/29/24 between [id number]). I discussed the patient with the AP/resident including, but not limited to, verifying findings; reviewing labs and x-rays; discussing with consultants; developing the plan of care with the bedside nurse; and discussing treatment plan with patient or surrogate. "  I have reviewed the note and assessment performed by the AP/resident and agree with the AP/resident’s documented findings and plan of care with the above additions/exceptions. Please see my comments for details and adjustments.

## 2024-03-29 NOTE — PROGRESS NOTES
[facility]  Progress Note  Name: Carla Hunt I  MRN: [id number]  Unit/Bed#: MICU 12 I Date of Admission: 1/10/2024   Date of Service: 3/29/2024 I Hospital Day: 79    Assessment/Plan   Drowsiness  Assessment & Plan  Brain imaging has been negative  No sedating medications on board  Responded well to provigil 100mg Qam but now drowsy again, no obvious etiology or source. Discussed with primary regarding consideration to increase to 200mg Qam    Teto marginal zone B-cell lymphoma (HCC)  Assessment & Plan  Previously on maintenance Rituxan therapy. Last treatment in December 2023  Bone marrow bx deferred at this time per onc    Anxiety state  Assessment & Plan  Continue Effexor per primary  Patient does not appear anxious during time of encounter if anything has been more sedate (despite no sedating medications ordered or given)    * Acute respiratory failure with hypoxia (HCC)  Assessment & Plan  Patient was re-intubated 3/11 with subsequent bedside trach on 3/12.   Very careful steroid wean  per critical care team.   On trach collar and PMV during time of encounter.  Spouse, Min, presented letter from hospital regarding hospital acquired infection. Answered questions to his satisfaction         Interval history:       No events overnight. Patient drowsy during time of encounter but appears calm and in no distress. On O2 via trach collar. Spouse, son, and daughter all at bedside. Discussed that family has had preliminary conversations about discharge planning to . Provided anticipatory guidance.     MEDICATIONS / ALLERGIES:     all current active meds have been reviewed    No Known Allergies    OBJECTIVE:    Physical Exam  Physical Exam  Constitutional:       General: She is not in acute distress.     Appearance: She is ill-appearing.   HENT:      Head: Atraumatic.   Neck:      Comments: Tracheostomy site appears CDI  Cardiovascular:      Rate and Rhythm: Tachycardia  present.   Pulmonary:      Effort: No respiratory distress.      Comments: Trach collar O2  Abdominal:      Tenderness: There is no guarding.      Comments: NGT in place receiving tube feeds   Skin:     General: Skin is warm and dry.   Neurological:      Comments: Does not awake to light tactile or verbal stimuli   Psychiatric:      Comments: calm         Lab Results:   Results from last 7 days   Lab Units 03/29/24  0556 03/28/24  0532 03/27/24  0545 03/26/24  0548 03/25/24  0835   WBC Thousand/uL 1.15* 1.07* 0.91* 0.90* 1.20*   HEMOGLOBIN g/dL 7.6* 7.8* 7.7* 8.3* 8.8*   HEMATOCRIT % 23.9* 24.6* 24.8* 26.8* 27.8*   PLATELETS Thousands/uL 22* 34* 43* 27* 38*   NEUTROS PCT %  --   --   --   --  78*   MONOS PCT %  --   --   --   --  2*   MONO PCT % 6 10 0* 1*  --    EOS PCT % 0  --  0 0 0     Results from last 7 days   Lab Units 03/29/24  0556 03/28/24  0532 03/27/24  0545 03/25/24  0523 03/24/24  0437 03/23/24  0519   POTASSIUM mmol/L 3.5 3.1* 4.0   < > 4.4 4.8   CHLORIDE mmol/L 110* 114* 114*   < > 119* 116*   CO2 mmol/L 18* 19* 20*   < > 23 21   BUN mg/dL 56* 62* 56*   < > 68* 75*   CREATININE mg/dL 0.69 0.83 0.83   < > 0.99 1.12   CALCIUM mg/dL 9.5 9.7 9.3   < > 9.2 8.8   ALK PHOS U/L  --   --   --   --  115* 98   ALT U/L  --   --   --   --  41 40   AST U/L  --   --   --   --  13 21    < > = values in this interval not displayed.       Imaging Studies: reviewed pertinent studies   EKG, Pathology, and Other Studies: reviewed pertinent studies    Counseling / Coordination of Care    Total floor / unit time spent today 35 minutes. Greater than 50% of total time was spent with the patient and / or family counseling and / or coordination of care. A description of the counseling / coordination of care: time spent assessing patient, providing support, communication with RN, and primary team.

## 2024-03-30 LAB
ABO GROUP BLD: NORMAL
ALBUMIN SERPL BCP-MCNC: 2.1 G/DL (ref 3.5–5)
ALP SERPL-CCNC: 196 U/L (ref 34–104)
ALT SERPL W P-5'-P-CCNC: 45 U/L (ref 7–52)
ANION GAP SERPL CALCULATED.3IONS-SCNC: 11 MMOL/L (ref 4–13)
ANISOCYTOSIS BLD QL SMEAR: PRESENT
AST SERPL W P-5'-P-CCNC: 12 U/L (ref 13–39)
BASE EX.OXY STD BLDV CALC-SCNC: 79.3 % (ref 60–80)
BASE EXCESS BLDV CALC-SCNC: -6.6 MMOL/L
BASOPHILS # BLD MANUAL: 0 THOUSAND/UL (ref 0–0.1)
BASOPHILS NFR MAR MANUAL: 0 % (ref 0–1)
BILIRUB SERPL-MCNC: 0.68 MG/DL (ref 0.2–1)
BLD GP AB SCN SERPL QL: NEGATIVE
BUN SERPL-MCNC: 61 MG/DL (ref 5–25)
BURR CELLS BLD QL SMEAR: PRESENT
CALCIUM ALBUM COR SERPL-MCNC: 11.5 MG/DL (ref 8.3–10.1)
CALCIUM SERPL-MCNC: 10 MG/DL (ref 8.4–10.2)
CHLORIDE SERPL-SCNC: 112 MMOL/L (ref 96–108)
CO2 SERPL-SCNC: 18 MMOL/L (ref 21–32)
CREAT SERPL-MCNC: 0.7 MG/DL (ref 0.6–1.3)
DACRYOCYTES BLD QL SMEAR: PRESENT
DOHLE BOD BLD QL SMEAR: PRESENT
EOSINOPHIL # BLD MANUAL: 0 THOUSAND/UL (ref 0–0.4)
EOSINOPHIL NFR BLD MANUAL: 0 % (ref 0–6)
ERYTHROCYTE [DISTWIDTH] IN BLOOD BY AUTOMATED COUNT: 17 % (ref 11.6–15.1)
GFR SERPL CREATININE-BSD FRML MDRD: 95 ML/MIN/1.73SQ M
GIANT PLATELETS BLD QL SMEAR: PRESENT
GLUCOSE SERPL-MCNC: 113 MG/DL (ref 65–140)
GLUCOSE SERPL-MCNC: 116 MG/DL (ref 65–140)
GLUCOSE SERPL-MCNC: 127 MG/DL (ref 65–140)
GLUCOSE SERPL-MCNC: 131 MG/DL (ref 65–140)
GLUCOSE SERPL-MCNC: 137 MG/DL (ref 65–140)
GLUCOSE SERPL-MCNC: 138 MG/DL (ref 65–140)
GLUCOSE SERPL-MCNC: 145 MG/DL (ref 65–140)
GLUCOSE SERPL-MCNC: 147 MG/DL (ref 65–140)
GLUCOSE SERPL-MCNC: 148 MG/DL (ref 65–140)
GLUCOSE SERPL-MCNC: 152 MG/DL (ref 65–140)
GLUCOSE SERPL-MCNC: 154 MG/DL (ref 65–140)
GLUCOSE SERPL-MCNC: 158 MG/DL (ref 65–140)
GLUCOSE SERPL-MCNC: 164 MG/DL (ref 65–140)
HCO3 BLDV-SCNC: 19.3 MMOL/L (ref 24–30)
HCT VFR BLD AUTO: 23.6 % (ref 34.8–46.1)
HGB BLD-MCNC: 7.6 G/DL (ref 11.5–15.4)
LYMPHOCYTES # BLD AUTO: 0.08 THOUSAND/UL (ref 0.6–4.47)
LYMPHOCYTES # BLD AUTO: 2 % (ref 14–44)
MAGNESIUM SERPL-MCNC: 2.8 MG/DL (ref 1.9–2.7)
MCH RBC QN AUTO: 30.9 PG (ref 26.8–34.3)
MCHC RBC AUTO-ENTMCNC: 32.2 G/DL (ref 31.4–37.4)
MCV RBC AUTO: 96 FL (ref 82–98)
METAMYELOCYTES NFR BLD MANUAL: 4 % (ref 0–1)
MONOCYTES # BLD AUTO: 0.22 THOUSAND/UL (ref 0–1.22)
MONOCYTES NFR BLD: 8 % (ref 4–12)
MYELOCYTES NFR BLD MANUAL: 3 % (ref 0–1)
NASAL CANNULA: 10
NEUTROPHILS # BLD MANUAL: 2.12 THOUSAND/UL (ref 1.85–7.62)
NEUTS BAND NFR BLD MANUAL: 29 % (ref 0–8)
NEUTS SEG NFR BLD AUTO: 49 % (ref 43–75)
NRBC BLD AUTO-RTO: 28 /100 WBC (ref 0–2)
O2 CT BLDV-SCNC: 9.2 ML/DL
OTHER FIO2: 80 %
PCO2 BLDV: 40 MM HG (ref 42–50)
PH BLDV: 7.3 [PH] (ref 7.3–7.4)
PHOSPHATE SERPL-MCNC: 4.2 MG/DL (ref 2.7–4.5)
PLATELET # BLD AUTO: 21 THOUSANDS/UL (ref 149–390)
PLATELET BLD QL SMEAR: ABNORMAL
PO2 BLDV: 46.8 MM HG (ref 35–45)
POIKILOCYTOSIS BLD QL SMEAR: PRESENT
POTASSIUM SERPL-SCNC: 3.5 MMOL/L (ref 3.5–5.3)
PROMYELOCYTES NFR BLD MANUAL: 4 % (ref 0–0)
PROT SERPL-MCNC: 4.9 G/DL (ref 6.4–8.4)
RBC # BLD AUTO: 2.46 MILLION/UL (ref 3.81–5.12)
RBC MORPH BLD: PRESENT
RH BLD: NEGATIVE
SODIUM SERPL-SCNC: 141 MMOL/L (ref 135–147)
SPECIMEN EXPIRATION DATE: NORMAL
TOXIC GRANULES BLD QL SMEAR: PRESENT
VARIANT LYMPHS # BLD AUTO: 1 %
WBC # BLD AUTO: 2.72 THOUSAND/UL (ref 4.31–10.16)
WBC TOXIC VACUOLES BLD QL SMEAR: PRESENT

## 2024-03-30 PROCEDURE — 86920 COMPATIBILITY TEST SPIN: CPT

## 2024-03-30 PROCEDURE — C9113 INJ PANTOPRAZOLE SODIUM, VIA: HCPCS | Performed by: INTERNAL MEDICINE

## 2024-03-30 PROCEDURE — 85007 BL SMEAR W/DIFF WBC COUNT: CPT

## 2024-03-30 PROCEDURE — 86901 BLOOD TYPING SEROLOGIC RH(D): CPT | Performed by: INTERNAL MEDICINE

## 2024-03-30 PROCEDURE — 83735 ASSAY OF MAGNESIUM: CPT | Performed by: STUDENT IN AN ORGANIZED HEALTH CARE EDUCATION/TRAINING PROGRAM

## 2024-03-30 PROCEDURE — 82948 REAGENT STRIP/BLOOD GLUCOSE: CPT

## 2024-03-30 PROCEDURE — 82805 BLOOD GASES W/O2 SATURATION: CPT | Performed by: INTERNAL MEDICINE

## 2024-03-30 PROCEDURE — P9037 PLATE PHERES LEUKOREDU IRRAD: HCPCS

## 2024-03-30 PROCEDURE — 94760 N-INVAS EAR/PLS OXIMETRY 1: CPT

## 2024-03-30 PROCEDURE — 86850 RBC ANTIBODY SCREEN: CPT | Performed by: INTERNAL MEDICINE

## 2024-03-30 PROCEDURE — 84100 ASSAY OF PHOSPHORUS: CPT

## 2024-03-30 PROCEDURE — 85027 COMPLETE CBC AUTOMATED: CPT

## 2024-03-30 PROCEDURE — 94669 MECHANICAL CHEST WALL OSCILL: CPT

## 2024-03-30 PROCEDURE — NC001 PR NO CHARGE: Performed by: STUDENT IN AN ORGANIZED HEALTH CARE EDUCATION/TRAINING PROGRAM

## 2024-03-30 PROCEDURE — 94664 DEMO&/EVAL PT USE INHALER: CPT

## 2024-03-30 PROCEDURE — 80053 COMPREHEN METABOLIC PANEL: CPT

## 2024-03-30 PROCEDURE — 86900 BLOOD TYPING SEROLOGIC ABO: CPT | Performed by: INTERNAL MEDICINE

## 2024-03-30 PROCEDURE — 94640 AIRWAY INHALATION TREATMENT: CPT

## 2024-03-30 RX ORDER — FUROSEMIDE 10 MG/ML
40 INJECTION INTRAMUSCULAR; INTRAVENOUS ONCE
Status: COMPLETED | OUTPATIENT
Start: 2024-03-30 | End: 2024-03-30

## 2024-03-30 RX ADMIN — VENLAFAXINE 12.5 MG: 25 TABLET ORAL at 17:33

## 2024-03-30 RX ADMIN — TOPIRAMATE 50 MG: 100 TABLET, FILM COATED ORAL at 08:55

## 2024-03-30 RX ADMIN — VENLAFAXINE 12.5 MG: 25 TABLET ORAL at 12:44

## 2024-03-30 RX ADMIN — FILGRASTIM-AAFI 300 MCG: 300 INJECTION, SOLUTION SUBCUTANEOUS at 08:54

## 2024-03-30 RX ADMIN — TOPIRAMATE 50 MG: 100 TABLET, FILM COATED ORAL at 17:33

## 2024-03-30 RX ADMIN — NYSTATIN: 100000 POWDER TOPICAL at 08:55

## 2024-03-30 RX ADMIN — LACOSAMIDE ORAL SOLUTION 100 MG: 10 SOLUTION ORAL at 09:08

## 2024-03-30 RX ADMIN — Medication 2 SPRAY: at 08:55

## 2024-03-30 RX ADMIN — POLYETHYLENE GLYCOL 3350 17 G: 17 POWDER, FOR SOLUTION ORAL at 08:55

## 2024-03-30 RX ADMIN — MODAFINIL 200 MG: 100 TABLET ORAL at 08:58

## 2024-03-30 RX ADMIN — VENLAFAXINE 12.5 MG: 25 TABLET ORAL at 09:08

## 2024-03-30 RX ADMIN — PANTOPRAZOLE SODIUM 40 MG: 40 INJECTION, POWDER, FOR SOLUTION INTRAVENOUS at 20:18

## 2024-03-30 RX ADMIN — CHLORHEXIDINE GLUCONATE 0.12% ORAL RINSE 15 ML: 1.2 LIQUID ORAL at 08:55

## 2024-03-30 RX ADMIN — PANTOPRAZOLE SODIUM 40 MG: 40 INJECTION, POWDER, FOR SOLUTION INTRAVENOUS at 08:44

## 2024-03-30 RX ADMIN — CHLORHEXIDINE GLUCONATE 0.12% ORAL RINSE 15 ML: 1.2 LIQUID ORAL at 20:18

## 2024-03-30 RX ADMIN — SODIUM CHLORIDE SOLN NEBU 3% 4 ML: 3 NEBU SOLN at 20:14

## 2024-03-30 RX ADMIN — LACOSAMIDE ORAL SOLUTION 100 MG: 10 SOLUTION ORAL at 20:18

## 2024-03-30 RX ADMIN — FUROSEMIDE 40 MG: 10 INJECTION, SOLUTION INTRAMUSCULAR; INTRAVENOUS at 18:22

## 2024-03-30 RX ADMIN — NYSTATIN: 100000 POWDER TOPICAL at 18:17

## 2024-03-30 RX ADMIN — LEVALBUTEROL HYDROCHLORIDE 1.25 MG: 1.25 SOLUTION RESPIRATORY (INHALATION) at 08:17

## 2024-03-30 RX ADMIN — INSULIN GLARGINE 10 UNITS: 100 INJECTION, SOLUTION SUBCUTANEOUS at 08:55

## 2024-03-30 RX ADMIN — METHYLPREDNISOLONE SODIUM SUCCINATE 40 MG: 40 INJECTION, POWDER, FOR SOLUTION INTRAMUSCULAR; INTRAVENOUS at 08:44

## 2024-03-30 RX ADMIN — Medication 2 SPRAY: at 20:18

## 2024-03-30 RX ADMIN — LEVALBUTEROL HYDROCHLORIDE 1.25 MG: 1.25 SOLUTION RESPIRATORY (INHALATION) at 20:14

## 2024-03-30 RX ADMIN — LEVALBUTEROL HYDROCHLORIDE 1.25 MG: 1.25 SOLUTION RESPIRATORY (INHALATION) at 14:00

## 2024-03-30 RX ADMIN — FUROSEMIDE 40 MG: 10 INJECTION, SOLUTION INTRAMUSCULAR; INTRAVENOUS at 08:44

## 2024-03-30 RX ADMIN — SODIUM CHLORIDE SOLN NEBU 3% 4 ML: 3 NEBU SOLN at 14:00

## 2024-03-30 RX ADMIN — SODIUM CHLORIDE 6 UNITS/HR: 9 INJECTION, SOLUTION INTRAVENOUS at 05:51

## 2024-03-30 RX ADMIN — SODIUM CHLORIDE SOLN NEBU 3% 4 ML: 3 NEBU SOLN at 08:17

## 2024-03-30 NOTE — NUTRITION
Nutrition Follow-Up:       03/29/24 4306   Recommendations/Interventions   Interventions/Recommendations Continue EN as ordered   Recommendations to Provider Current EN regimen (Vital 1.5 @ 45mL/hr + one packet prosource liquid protein daily + Refugio one packet BID) remains appropriate to meet estimated nutrition needs at this time. Will continue to follow.

## 2024-03-30 NOTE — PLAN OF CARE
Problem: Prexisting or High Potential for Compromised Skin Integrity  Goal: Skin integrity is maintained or improved  Description: INTERVENTIONS:  - Identify patients at risk for skin breakdown  - Assess and monitor skin integrity  - Assess and monitor nutrition and hydration status  - Monitor labs   - Assess for incontinence   - Turn and reposition patient  - Assist with mobility/ambulation  - Relieve pressure over bony prominences  - Avoid friction and shearing  - Provide appropriate hygiene as needed including keeping skin clean and dry  - Evaluate need for skin moisturizer/barrier cream  - Collaborate with interdisciplinary team   - Patient/family teaching  - Consider wound care consult   Outcome: Progressing     Problem: SAFETY ADULT  Goal: Patient will remain free of falls  Description: INTERVENTIONS:  - Educate patient/family on patient safety including physical limitations  - Instruct patient to call for assistance with activity   - Consult OT/PT to assist with strengthening/mobility   - Keep Call bell within reach  - Keep bed low and locked with side rails adjusted as appropriate  - Keep care items and personal belongings within reach  - Initiate and maintain comfort rounds  - Make Fall Risk Sign visible to staff  - Offer Toileting every 2 Hours, in advance of need  - Initiate/Maintain bed alarm  - Obtain necessary fall risk management equipment: non skid footwear  - Apply yellow socks and bracelet for high fall risk patients  - Consider moving patient to room near nurses station  Outcome: Progressing     Problem: RESPIRATORY - ADULT  Goal: Achieves optimal ventilation and oxygenation  Description: INTERVENTIONS:  - Assess for changes in respiratory status  - Assess for changes in mentation and behavior  - Position to facilitate oxygenation and minimize respiratory effort  - Oxygen administered by appropriate delivery if ordered  - Initiate smoking cessation education as indicated  - Encourage  broncho-pulmonary hygiene including cough, deep breathe, Incentive Spirometry  - Assess the need for suctioning and aspirate as needed  - Assess and instruct to report SOB or any respiratory difficulty  - Respiratory Therapy support as indicated  Outcome: Progressing     Problem: SKIN/TISSUE INTEGRITY - ADULT  Goal: Skin Integrity remains intact(Skin Breakdown Prevention)  Description: Assess:  -Perform Flavio assessment every shift   -Clean and moisturize skin every day   -Inspect skin when repositioning, toileting, and assisting with ADLS  -Assess under medical devices such as ronny  every hour   -Assess extremities for adequate circulation and sensation     Bed Management:  -Have minimal linens on bed & keep smooth, unwrinkled  -Change linens as needed when moist or perspiring  -Avoid sitting or lying in one position for more than 2 hours while in bed  -Keep HOB at 45 degrees     Toileting:  -Offer bedside commode  -Assess for incontinence every hour  -Use incontinent care products after each incontinent episode such as moisture barrier cream     Activity:  -Mobilize patient 3 times a day  -Encourage activity and walks on unit  -Encourage or provide ROM exercises   -Turn and reposition patient every 2 Hours  -Use appropriate equipment to lift or move patient in bed  -Instruct/ Assist with weight shifting every hour when out of bed in chair  -Consider limitation of chair time 2 hour intervals    Skin Care:  -Avoid use of baby powder, tape, friction and shearing, hot water or constrictive clothing  -Relieve pressure over bony prominences using allevyn   -Do not massage red bony areas    Next Steps:  -Teach patient strategies to minimize risks such as weight shifting    -Consider consults to  interdisciplinary teams such as PT  Outcome: Progressing  Goal: Pressure injury heals and does not worsen  Description: Interventions:  - Implement low air loss mattress or specialty surface (Criteria met)  - Apply silicone  foam dressing  - Instruct/assist with weight shifting every 60 minutes when in chair   - Limit chair time to 2 hour intervals  - Consider nutrition services referral as needed  Outcome: Progressing     Problem: Potential for Falls  Goal: Patient will remain free of falls  Description: INTERVENTIONS:  - Educate patient/family on patient safety including physical limitations  - Instruct patient to call for assistance with activity   - Consult OT/PT to assist with strengthening/mobility   - Keep Call bell within reach  - Keep bed low and locked with side rails adjusted as appropriate  - Keep care items and personal belongings within reach  - Initiate and maintain comfort rounds  - Make Fall Risk Sign visible to staff  - Offer Toileting every 2 Hours, in advance of need  - Initiate/Maintain bed alarm  - Obtain necessary fall risk management equipment: non skid footwear  - Apply yellow socks and bracelet for high fall risk patients  - Consider moving patient to room near nurses station  Outcome: Progressing     Problem: Nutrition/Hydration-ADULT  Goal: Nutrient/Hydration intake appropriate for improving, restoring or maintaining nutritional needs  Description: Monitor and assess patient's nutrition/hydration status for malnutrition. Collaborate with interdisciplinary team and initiate plan and interventions as ordered.  Monitor patient's weight and dietary intake as ordered or per policy. Utilize nutrition screening tool and intervene as necessary. Determine patient's food preferences and provide high-protein, high-caloric foods as appropriate.     INTERVENTIONS:  - Monitor oral intake, urinary output, labs, and treatment plans  - Assess nutrition and hydration status and recommend course of action  - Evaluate amount of meals eaten  - Assist patient with eating if necessary   - Allow adequate time for meals  - Recommend/ encourage appropriate diets, oral nutritional supplements, and vitamin/mineral supplements  - Order,  calculate, and assess calorie counts as needed  - Recommend, monitor, and adjust tube feedings and TPN/PPN based on assessed needs  - Assess need for intravenous fluids  - Provide specific nutrition/hydration education as appropriate  - Include patient/family/caregiver in decisions related to nutrition  Outcome: Progressing     Problem: COPING  Goal: Pt/Family able to verbalize concerns and demonstrate effective coping strategies  Description: INTERVENTIONS:  - Assist patient/family to identify coping skills, available support systems and cultural and spiritual values  - Provide emotional support, including active listening and acknowledgement of concerns of patient and caregivers  - Reduce environmental stimuli, as able  - Provide patient education  - Assess for spiritual pain/suffering and initiate spiritual care, including notification of Pastoral Care or natalia based community as needed  - Assess effectiveness of coping strategies  Outcome: Progressing     Problem: BEHAVIOR  Goal: Pt/Family maintain appropriate behavior and adhere to behavioral management agreement, if implemented  Description: INTERVENTIONS:  - Assess the family dynamic   - Encourage verbalization of thoughts and concerns in a socially appropriate manner  - Assess patient/family's coping skills and non-compliant behavior (including use of illegal substances).  - Utilize positive, consistent limit setting strategies supporting safety of patient, staff and others  - Initiate consult with Case Management, Spiritual Care or other ancillary services as appropriate  - If a patient's/visitor's behavior jeopardizes the safety of the patient, staff, or others, refer to organization procedure.   - Notify Security of behavior or suspected illegal substances which indicate the need for search of the patient and/or belongings  - Encourage participation in the decision making process about a behavioral management agreement; implement if patient meets  criteria  Outcome: Progressing     Problem: NEUROSENSORY - ADULT  Goal: Achieves maximal functionality and self care  Description: INTERVENTIONS  - Monitor swallowing and airway patency with patient fatigue and changes in neurological status  - Encourage and assist patient to increase activity and self care.   - Encourage visually impaired, hearing impaired and aphasic patients to use assistive/communication devices  Outcome: Progressing     Problem: GASTROINTESTINAL - ADULT  Goal: Minimal or absence of nausea and/or vomiting  Description: INTERVENTIONS:  - Administer IV fluids if ordered to ensure adequate hydration  - Maintain NPO status until nausea and vomiting are resolved  - Nasogastric tube if ordered  - Administer ordered antiemetic medications as needed  - Provide nonpharmacologic comfort measures as appropriate  - Advance diet as tolerated, if ordered  - Consider nutrition services referral to assist patient with adequate nutrition and appropriate food choices  Outcome: Progressing  Goal: Maintains or returns to baseline bowel function  Description: INTERVENTIONS:  - Assess bowel function  - Encourage oral fluids to ensure adequate hydration  - Administer IV fluids if ordered to ensure adequate hydration  - Administer ordered medications as needed  - Encourage mobilization and activity  - Consider nutritional services referral to assist patient with adequate nutrition and appropriate food choices  Outcome: Progressing  Goal: Establish and maintain optimal ostomy function  Description: INTERVENTIONS:  - Assess bowel function  - Encourage oral fluids to ensure adequate hydration  - Administer IV fluids if ordered to ensure adequate hydration   - Administer ordered medications as needed  - Encourage mobilization and activity  - Nutrition services referral to assist patient with appropriate food choices  - Assess stoma site  - Consider wound care consult   Outcome: Progressing  Goal: Oral mucous membranes  remain intact  Description: INTERVENTIONS  - Assess oral mucosa and hygiene practices  - Implement preventative oral hygiene regimen  - Implement oral medicated treatments as ordered  - Initiate Nutrition services referral as needed  Outcome: Progressing     Problem: GENITOURINARY - ADULT  Goal: Urinary catheter remains patent  Description: INTERVENTIONS:  - Assess patency of urinary catheter  - If patient has a chronic marie, consider changing catheter if non-functioning  - Follow guidelines for intermittent irrigation of non-functioning urinary catheter  Outcome: Progressing     Problem: METABOLIC, FLUID AND ELECTROLYTES - ADULT  Goal: Electrolytes maintained within normal limits  Description: INTERVENTIONS:  - Monitor labs and assess patient for signs and symptoms of electrolyte imbalances  - Administer electrolyte replacement as ordered  - Monitor response to electrolyte replacements, including repeat lab results as appropriate  - Instruct patient on fluid and nutrition as appropriate  Outcome: Progressing  Goal: Fluid balance maintained  Description: INTERVENTIONS:  - Monitor labs   - Monitor I/O and WT  - Instruct patient on fluid and nutrition as appropriate  - Assess for signs & symptoms of volume excess or deficit  Outcome: Progressing     Problem: HEMATOLOGIC - ADULT  Goal: Maintains hematologic stability  Description: INTERVENTIONS  - Assess for signs and symptoms of bleeding or hemorrhage  - Monitor labs  - Administer supportive blood products/factors as ordered and appropriate  Outcome: Progressing

## 2024-03-30 NOTE — PROGRESS NOTES
Critical Care Attending Note; Bharat Márquez   Note Date: 24  Note Time: 10:32 AM    Patient: Carla Hunt  Age, : 59 y.o., 1965 MRN: [id number] Code Status: Level 1 - Full Code Patient Location: MICU 12/MICU 12   Hospital LOS:80 days  ]   Patient seen and examined, medical record reviewed, discussed with house staff and nursing staff.     HPI   CC: Respiratory Failure   59F with a PMH of B - Cell Lymphoma(Bendamustine-Rituximab x 6 cycles [21 - 1/3/22], maintenance Rituximab), Epilepsy(Temporal Lobe resection- Complex Migraines) originally admitted for AMS found to have COVID, she has had a very complicated course requiring multiple intubations, volvulus(Ex Lap - ileostomy - wound dehiscence), hemorrhagic shock,  PNAs.    Key Recent Events   : [facility] - AMS, COVID - Steroid protocol  1/10: Transfer [facility] Video EEG  : LP   1/15: LP  : Bronch  : Pulse Dose steroids - 3 days   Intubated - Epistaxis  2/15: IVIG Completed  : Bronch - COVID + Antigen/PCR  : Volvulus - ex lap, ileocecectomy, end ileostomy and mucus fistula creation   :  Bronch   : Bronch  3/1: Extubated  3/11: Re-intubated, Bronch - Stenotrophomonas/Pseudo  3/12: Bronch  3/12: Trach  3/13: BAL - Stenotrophomonas   3/15: 14d course Paxlovid/remdesivir  3/17: Bronch  3/20: Hemorrhagic shock - Ostomy - 2PRBCs - OR -   3/21: Hemorrhage Ostomy/WV- 1 PRBCs - OR -  3/30: No acute events overnight      Main ICU Plans:       #Neuro  Anxiety/Alertness  - Venlanfaxine  - Increase Modafinil    Seizure disorder  - Vimpat, Topimax  - Neurology Consult     #Pulm  Hypoxic Respiratory Failure - COVID, Pneumonitis, Rituximab - Improved but failed weaning steroids. Extended steroid course and has been on steroids for > 1 month. Plan for slower wean from steroids to monitor for improvement. Respiratory status worsening, concerns for pulmonary edema and decreased compliance. If patient does not improve with diuresis  she may need positive pressure support.    - TCT daily  - Diuresis - Goal -2L  - Wean  Solumedrol  40mg qday - recommend slow wean    Pulmonary Hygiene   - Start Chest PT, Hypertonic Saline    #Renal  Hypernatremia - Resolved    Urinary Retention/Hematuria - Likely traumatic marie in the setting of thrombocytopenia  - Marie - Failed voiding trial    #GI  Volvulus -   - General surgery following  - Ostomy/Wound Care    Severe Protein Calorie malnutrition - Patient unlikely to maintain enough oral intake to keep up with calorie demand  - Recommend PEG once stable - will discuss with surgery who wants to hold off on PEG due to wound healing   - Swallow eval - NMS   - Aggressive PT/OT      #ID   Stenotrophomonas PNA  - ID Consulted - Minocycline - Completed 14 day course  - Bactrim PPX     COVID PNA - Extended steroid/antiviral course requiring pulse dose steroids with improvement in respiratory function. Treatment difficult due to underlying immunosuppression  - Steroids as above      #MSK  Steroid/Critical Care Neuropathy  - PT/OT     #Heme  Anemia - concerns for PUD(no melena, BRPR, hematemesis), maybe due to BM suppression  - GI Consulted - holding off on EGD     Thrombocytopenia/Neutropenia - Likely consumptive/BM suppression - possibly due to anti-epileptics(transitioned off Lamictal), doxycycline. Neutrophil count improving with GCSF but now with hematuria. Plan to increase platelet goal while bleeding  - Heme consulted - Filgastrum  - PLT >50     #Endo  DM  - insulin gtt    #DVT/GI ppx  Hold SQH     #Lines/Tubes/Drains:   Invasive Devices       Peripheral Intravenous Line  Duration             Long-Dwell Peripheral IV (Midline) 03/08/24 Left Cephalic Vein 21 days    Peripheral IV 03/30/24 Right Antecubital <1 day              Drain  Duration             Ileostomy RUQ 38 days    NG/OG/Enteral Tube Nasogastric 14 Fr Right nare 24 days    Urethral Catheter Double-lumen 3 days              Airway  Duration              Surgical Airway Shiley Cuffed 17 days                    #Nutrition:   Diet Enteral/Parenteral; Tube Feeding No Oral Diet; Vital 1.5; Continuous; 45; Prosource Protein Liquid - One Packet; OD; Refugio - One Packet; BID; 300; Water; Every 4 hours        #Code Status:   Level 1 - Full Code    #Dispo:   ICU        Bharat Márquez MD  Pulmonary, Critical Care    Critical care time, excluding procedures, teaching, family meetings, and excludes any prior time recorded by the AP/resident, 35 minutes. Upon my evaluation, this patient has a high probability of imminent or life-threatening deterioration due to above problems which required my direct attention, intervention, and personal management.   Impression/Active Problems:    Acute hypoxic respiratory failure ventilator dependent- s/p tracheostomy  Bilateral ground glass opacities- likely persistent PNA and superimposed bacterial infection  Persistent COVID Pna with superimposed bacterial pna- w/ stenotrophomonas  Acute blood loss anemia- from stoma site  Hemorrhagic shock  Severe encephalopathy- possibly due to uremia  Cutaneous ulcer- below the trach site, likely due to pressure.   Stenotrophomonas PNA  Sepsis- 2/2 persistent pneumonia  Volvulus- post hemicolectomy s/p cecal ostomy. Noted stomal retraction  Shock- unclear etiology  Thrombocytopenia  Bcell lymphoma- on rituximab  Steroid induced hyperglycemia  Minimal SAH POA  Seizure disorder  Upper extremity thrombophlebitis  Severe lymphopenia  Hx of migraines- s/p left temporal lobectomy  Hypogammaglobulinemia  Critical illness myopathy  Hematuria  Neutropenia    Physical Exam:     Vital Signs:   Weight: 76.4 kg (168 lb 6.9 oz)  IBW: Ideal body weight: 63.9 kg (140 lb 14 oz)  Adjusted ideal body weight: 68.9 kg (151 lb 14.4 oz)  Temp:  [96.9 °F (36.1 °C)-98.9 °F (37.2 °C)] 98.9 °F (37.2 °C)  HR:  [118-140] 133  Resp:  [25-39] 37  BP: (119-154)/(61-82) 144/77  FiO2 (%):  [40-60] 60  Physical Exam:  Unchanged  General: NAD  Neuro: More fatigued  Heart: RRR  Lungs: Basilar crackles  Abdomen: Midline - Wound vac - CDI, Ostomy, NT  Extremities: Edema worsening                Ventilator Settings:    FiO2 (%):  [40-60] 60    Results from last 7 days   Lab Units 03/30/24  0847   PO2 NICA mm Hg 46.8*       Radiologic Images Reviewed:    CXR  Mild prominent interstitial markings. No pneumothorax or pleural effusion.     Normal cardiomediastinal silhouette.     Bones are unremarkable for age.     Normal upper abdomen.     IMPRESSION:     Mild congestive changes.    CT C/A/P  IMPRESSION:  1. Persistent bilateral groundglass and nodular consolidation with peripheral opacification at the right greater than left lung bases. Findings remain concerning for multi lobar infiltrates with possible superimposed COVID-19 opacities.  2. Status post ileocolectomy with a right lower quadrant ileostomy again exhibiting a patent stoma. Persistent inflammatory change and subcutaneous emphysema after recent intervention. No drainable collection.  3. Bilateral renal calcifications again suggestive of medullary sponge kidney with persistent mild right renal fullness but no evidence of distal obstructive pathology.        Input / Output:     Intake/Output Summary (Last 24 hours) at 3/30/2024 1032  Last data filed at 3/30/2024 0958  Gross per 24 hour   Intake 3882.29 ml   Output 2785 ml   Net 1097.29 ml            Infusions:  insulin regular (HumuLIN R,NovoLIN R) 1 Units/mL in sodium chloride 0.9 % 100 mL infusion, 0.3-21 Units/hr, Last Rate: 6 Units/hr (03/30/24 0958)        Scheduled Medications:  Current Facility-Administered Medications   Medication Dose Route Frequency Provider Last Rate    acetaminophen  650 mg Oral Q6H PRN Moises Bowden MD      chlorhexidine  15 mL Mouth/Throat Q12H [facility] Moises Bowden MD      fentaNYL  25 mcg Intravenous Q1H PRN Joe Lazcano DO      Filgrastim-aafi  300 mcg Subcutaneous Daily Cosmo Sin DO      insulin glargine  " 10 Units Subcutaneous QAM Elenasandi Mahajan DO      insulin lispro  1-6 Units Subcutaneous HS Gomezmar DO Jaleesa      insulin regular (HumuLIN R,NovoLIN R) 1 Units/mL in sodium chloride 0.9 % 100 mL infusion  0.3-21 Units/hr Intravenous Titrated Ajit Oquendo MD 6 Units/hr (03/30/24 0958)    lacosamide  100 mg Oral Q12H [facility] Sharifa Preciado PA-C      levalbuterol  1.25 mg Nebulization TID Moises Bowden MD      methylPREDNISolone sodium succinate  40 mg Intravenous Daily Joe Lazcano DO      modafinil  200 mg Per NG Tube Daily Joe Lazcano DO      nystatin   Topical BID Moises Bowden MD      ondansetron  4 mg Intravenous Q6H PRN Moises Bowden MD      pantoprazole  40 mg Intravenous Q12H [facility] Moises Bowden MD      polyethylene glycol  17 g Per NG Tube Daily Moises Bowden MD      sodium chloride  1 Application Nasal Q1H PRN Moises Bowden MD      sodium chloride  2 spray Each Nare BID Emilie Soyeon Kim, MD      sodium chloride  4 mL Nebulization TID Moises Bowden MD      sulfamethoxazole-trimethoprim  1 tablet Oral Once per day on Monday Wednesday Friday Joe Lazcano DO      topiramate  50 mg Per PEG Tube BID Moises Bowden MD      venlafaxine  12.5 mg Oral TID With Meals Moises Bowden MD         PRN Medications:    acetaminophen    fentaNYL    ondansetron    sodium chloride    Labs Reviewed:  Results from last 7 days   Lab Units 03/30/24  0454 03/29/24  0556 03/28/24  0532   WBC Thousand/uL 2.72* 1.15* 1.07*   HEMOGLOBIN g/dL 7.6* 7.6* 7.8*   HEMATOCRIT % 23.6* 23.9* 24.6*   PLATELETS Thousands/uL 21* 22* 34*      Results from last 7 days   Lab Units 03/30/24  0454 03/29/24  1752 03/29/24  0556 03/28/24  0532   SODIUM mmol/L 141 139 137 145   CO2 mmol/L 18* 19* 18* 19*   BUN mg/dL 61* 52* 56* 62*   CALCIUM mg/dL 10.0 9.8 9.5 9.7   MAGNESIUM mg/dL 2.8*  --  1.7* 2.0   PHOSPHORUS mg/dL 4.2  --  3.2 3.9         Invalid input(s): \"ASTSGOT\", \"ALTSGPT\"LABRCNTIP@ ,alkphos:3,tbilirubin:3,dbilirubin:3)@              Invalid input(s): \"TROPT\", \"PBNP\"         "     I have personally seen and examined the patient on (03/30/24 between [id number]). I discussed the patient with the AP/resident including, but not limited to, verifying findings; reviewing labs and x-rays; discussing with consultants; developing the plan of care with the bedside nurse; and discussing treatment plan with patient or surrogate.  I have reviewed the note and assessment performed by the AP/resident and agree with the AP/resident’s documented findings and plan of care with the above additions/exceptions. Please see my comments for details and adjustments.

## 2024-03-30 NOTE — PROGRESS NOTES
[facility]  Progress Note: Critical Care  Name: Carla Hunt 59 y.o. female I MRN: [id number]  Unit/Bed#: MICU 12 I Date of Admission: 1/10/2024   Date of Service: 3/30/2024 I Hospital Day: 80    Assessment/Plan   Acute hypoxic respiratory failure; s/p tracheostomy 3/12  Bilateral ground glass opacities, persistent, improving  Pancytopenia- multifactorial including hx b-cell lymphoma, persistent inflammation  Acute on chronic anemia- multifactorial-intermittent blood loss from ostomy site, chronic inflammation, iatrogenic, marrow dysfunction in setting of persistent inflammation   Lymphopenia with bandemia, in setting of high dose corticosteroids  Severe Metabolic encephalopathy, multifactorial including ?uremia, improving  Uremia, ?catabolic from steroids, improving  Oropharyngeal dysphagia, NPO on TF, in setting of multiple prolonged intubations  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; complicated by poor wound healing  Wound dehiscence 2/2 poor wound healing s/p wound vac 3/13  B-Cell lymphoma, s/p chemotheraphy 2022, Rituxan maintenance therapy on hold 2/2 persistent covid-19  Seizure disorder; on vimpat  Steroid induced hyperglycemia;on insulin gtt  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  Hx of complex migraines s/p L temporal lobectomy 2007 complicated by #18  Hx of complex seizures in setting of #17, on AEDs  COVID-19 infection POA; resolved; s/p multiple courses of antivirals 2/2 persistent infection,  most recent Remdesivir course completed 3/15, superimposed by recurrent PNA s/p multiple courses Abx  Stenotrophomonas PNA; recurrent, previously on Bactrim, switched to minocycline 2/2 ELIESER now resolved, completed 14d course 3/27  Minimal SAH POA, no interventions required, resolved on repeat CTH     Neuro:    Diagnosis: Alertness  -Continue modafinil increased, delerium precautions  Diagnosis: Analgesia  - PRN Fentanyl  25mcg/hr; on holiday for frequent neurochecks     Diagnosis: Metabolic encephalopathy; POA, improving  -Waxing and waning neuro exam since admission  -Most recent repeat CTH 3/13 negative for acute intracranial findings.  Has had extensive neurological workup including MRI/CT neuroimaging, LP with unremarkable findings  -Now remains off sedation. Electrolytes, sodium, Hyperglycemia 2/2 high dose steroids requiring insulin gtt. Ammonia normal. May be prolonged 2/2 PNA, possibly worsened by uremic encephalopathy worsened by ELIESER now resolved, uremia improving; candida growth seen on BAL Cx from 3/11, may be respiraotry colonization, however currently without signs of systemic fungal infection but is at high risk 2/2 lymphopenia, depressed immunoglobulins in setting of B cell lymphoma and high dose corticosteroids.   Plan:  -Slow steroid wean: currently IV Solumedrol 30mg BID day 7, followed by 40mg qd   -S/P 14d Remdesivir course to tx COVID-19, completed 3/15  -S/P 14d Minocycline course to tx stenotrophomonas PNA, completed 3/27   -Continue modafinil as above  -Avoid PRN fentanyl/sedative meds as able.  -Delirium precautions  -Frequent neurochecks  -Defer MRI brain due to clinical improvement, not likely to      Diagnosis: seizure disorder, known history  -S/p partial left temporal lobe resection in 2007 2/2 complex migraines  -On Lamictal 150 bid and Topamax 50mg bid at home  -Last lamotrigine level from 03/02 at 10.7 (therapeutic)  -Home Lamictal switched to Vimpat 3/27 due to worsening pancytopenia, neuro following  -Continue Vimpat 100mg bid maintenance dose  -Continue home topiramate 50mg bid  -Seizure precautions      Diagnosis: Anxiety, known history  -On Effexor 12.5 mg TID     CV:  -Diagnosis: Sinus tachycardia  -TTE 3/17 with no major structural dysfunction  -Multifactorial 2/2 deconditioning, dehydration/hypvol, acute on chronic anemia, underlying infectious/inflammatory process, pain,  Modafinal-induced  Plan:  - See plans under Pulm, Heme/Onc, ID, MSK    Pulm:  Diagnosis: Acute hypoxic respiratory failure;  Diagnosis: Bilateral ground glass opacities, improving  -Vent dependent; s/p trach 3/12 after was reintubated 3/11 due to increased WOB  -Persistently COVID+ since admission s/p multiple courses of antivirals (most recent completed 3/15), complicated by recurrent bacterial PNA treated w 10d levaquin (completed 2/25) for MDR PNA; most recent BAL +recurrent stenotrophomona treated with Bactrim, swithced to minocyline 14d course completed 3/28.  -Etiology Likely multifactorial including possible COVID pneumonitis vs recurrent PNA vs hypersensitivity pneumonitis 2/2 ?rituxumab. Persistent inflammation likely given patient has been steroid responsive throughout admission.   -CRP increasing but likely from intraabdominal inflammation as patient is noted to have clinical improvement with weaning of steroids, ABx. Repeat CXR 3/22 with significant improvement of multifocal PNA. Repeat COVID ag negative x2 3/27  Plan:  -S/P 14d Remdesivir course to tx COVID-19, completed 3/15  -S/P 14d Minocycline course to tx stenotrophomonas PNA, completed 3/27   - anti-Rituximab ab negative  -Steroid wean IV Solu-Medrol 30 mg bid x7d course, followed by 40mg qd x7d, decrease by 10mg qd dosing x7d  -Hold off on starting empiric antifungal given clinical improvement  -Continue trach collar, wean O2 as able     GI:  Diagnosis: Partial cecal volvulus s/p right hemicolectomy with ostomy pouch in place  -Complicated by poor wound healing of midline incision as evidenced by wound dehiscence s/p wound vac that was removed 3/17 by gen surg.  -Stoma with dark brown output, consistent with prior  Plan:  -Wound vac as per general surgery  -Monitor ostomy pouch output  -Surgery following, will keep them updated if any further changes     Diagnosis: oropharyngeal dysphagia   -Has had multiple and prolonged intubations now s/p  trach   -VBS 3/27 with oropharyngeal dysphagia  -Speech therapy to begin neuromuscular electrical stim/NMES/VitalStim,   -Continue tube feeds for now until PEG placement pending abdominal wound healing as per Gen Surg    :  Diagnosis: Hypernatremia, resolved with D5  Sodium 140 today (corrected for )  -Hold D5W   -Free water flushes to 300cc q4h     Diagnosis: uremia, improving  Trending down  -?catabolic from steroids, may also have ?slow UGIB in setting of prolonged steroids though no overt s/s of GIB and H&H stable since 3/23 and patient is on ppi  -Trending down  -Will consider GI consultation for potential EGD inpatient if clinical s/s UGIB     Diagnosis: CKD III,   -Baseline Cr  0.8-1.2  -Cr now stable and at baseline.   -UO adequate, on Ortiz, draining clear yellow urine  -Prerenal azotemia 2/2 uremia,   Plan:  -Trend daily BMP  -Continue to monitor I/O and UOP  -Avoid nephrotoxins, contrast dye as able  -See plan under uremia     F/E/N:  F: none  E: monitor and replete for goal K>4, P>3, Mg>2  N: tube feeds with vital 1.5; 45 cc/h, Prosource liquid protein to 1 packet BID, Refugio BID to support wound healing       Heme/Onc:  Diagnosis: Pancytopenia  -In setting of hx of B cell lymphoma, prior chemo, Rituxan therapy, prior abx and present meds including lamictal  -Hemo onc consulted, empirically given Filgastrim 300mg qd, day 3/3; IR bone marrow bx deferred by heme-onc   -Lamictal switched to Vimpat in consultation with neuro as above due to potential contribution to pancytopenia  -Trend CBC and diff daily, neutropenic precautions for ANC <500    Diagnosis: Acute on chronic anemia  -Baseline Hgb ~7-8. S/P 2U PRBCs 3/17 after repeat Hgb 5.3, total of 6U transfuse  d since admission.  -Patient had significant blood loss from ostomy site 3/20, resulting in hemorraghic shock, improved with blood transfusion; hemostasis achieved after bleeding source identified and sutured. Another large vol danie bloody  output from osotomy on 3/21, bleeding source within ostomy site, sutured.    -Hgb dropping today 8.7, despite hypernatremia/being dry  -May still have slow GIB due to persistent uremia, GI previously consulted for EGD but was deferred due to hemodynamic instability at the time  -?Worsened by impaired marrow response liekly 2/2 persistent inflammation due to low retic index ~1 prior to most recent transfusions; normocytic with normal B12/folate levels; MCV<100. Iatrogenic cause likely contributing 2/2 frequent blood draws; chronic anemia likely persistent inflammatory state/CKD/lymphoma in setting of elevated ferritin of 974. SPEP/UPEP negative. Hemolysis workup negative.   Plan:  -Routine monitoring ostomy output, if bleeding, apply direct pressure and contact gen surg STAT for hemostasis .  -Continue tube feeds/nutritional support  -Trend CBC, transfuse Transfuse with leukoreduced and irradiated RBCs to maintain Hgb>7  -See plan under pancytopenia     Diagnosis: Thrombocytopenia  -Likely multifactorial including imparied production from marrow dysfunction in setting of persistent inflammation; RI low suggestive of hyperproliferation, hx of B-cell lymphoma, recent infections including persistent COVID, PNA treated, CMV negative. No known history of vWD/congenital disorders. No liver dysfunction; recent hepatic profile unremarkable. HIT workup negative, Hemolytic workup negative. No s/s DIC. No mechanical valves. ?Primary ITP.  Plan:  -Filgrastim 300mg x3 to aid in plt recovery  -Transfuse with leukoreduced and irradiated PLTs if count <20k due to increased risk of bleeding   -Goal >50k  -Hold Lovenox for DVT ppx, resume once Plt>50k  -See plan under pancytopenia      Diagnosis Lymphopenia with bandemia  -In setting of high dose steroids  -Severely depressed immunoglobulins inclding IgG, IgA, IgM  -At risk for further infections  -Filgrastim 300mg x3 to aid in plt recovery  -Contact and airborne  precautions    Diagnosis: B cell lymphoma  -[facility]  -S/P 6 cycles of chemotherapy in 20222  -Maintained on Rituxan for 2 year course PTA, started May 2022, last dose was 12/2024, treatment currently on hold in setting of persistent COVID-19 infection  Plan:  -Holding rituximab in setting of persistent COVID-19 infection, now resolved.  -Await anti-Rituximab ab to assess for drug induced hypersensitivity syndrome  ?resume rituxubmab pending clinical stability & anti-ritux ab status in consultation with heme-onc     DVT ppx: hold lovenox 2/2 thrombocytopenia     Endo:  Diagnosis: steroid hyperglycemia and diabetes mellitus type 2, A1c at 6.6 from 01/2024  -Goal -180  -Insulin gtt     ID:  Diagnosis: Recurrent bacterial pneumonia  - Patient has completed multiple treatment courses of remdesivir throughout hospitalization in addition to 7 days of ceftriaxone.  - BAL cultures in 2/2024 positive for MDR Pseudomonas and stenotrophomonas treated with 10d course of Levaquin  - CT C/A/P 3/10 with persistent groundglass opacities and changes suggestive of sequelae of COVID, prior infections.  Completed 10d course of cefepime for broad spectrum coveragge (completed (3/13)  -Repeat BAL cultures from 3/11 showing 4+ growth Stenotrophomonas maltophilia. Could be colonization given prior BAL growth of same, however due to recent intubation with prolonged corticosteroid theraphy, will begin treating as true pathogen. Patient otherwise remains afebrile, no leukocytosis. CRP with down trending.  Previously on Bactrim from 3/13 to 3/18, discontinued due to worsening ELIESER  Plan:  -S/P 14d Minocycline course to tx stenotrophomonas PNA, completed 3/27   -IV steroids as above     MSK/Skin:  Diagnosis: Midline incision wound with poor wound healing-  -General surgery performed staple removal of the patient's midline incision on 3/12 with some skin signs appreciated at the inferior aspect of the wound without  obvious pus drainage. Overnight 3/13, wound dehiscence progressed requiring general surgery reevaluation. Red/brown aspirate noted, fascia intact. Wet-to-dry dressings in place. General surgery following for next Epson wound care.  -Poor wound healing in setting of high-dose steroid therapy.  No  exam no obvious signs of infection at this time.   -S/P wound vac replacement 3/13 as per gen surgery. No active concerns for cellulitis.  Plan:  -Wound VAC management as per general surgery  -Wound care for peritracheostomy wound  -Abdominal wound care as per general surgery  -Routine monitoring     Diagnosis: Critical illness myopathy  -Likely exacerbated by high-dose steroid therapy  Plan:  -Continue to wean steroids as above  -Aggressive PT/OT         Disposition: Critical care    ICU Core Measures     Vented Patient  VAP Bundle  VAP bundle ordered     A: Assess, Prevent, and Manage Pain Has pain been assessed? Yes  Need for changes to pain regimen? NA   B: Both Spontaneous Awakening Trials (SATs) and Spontaneous Breathing Trials (SBTs) Plan to perform spontaneous awakening trial today? N/A   Plan to perform spontaneous breathing trial today? N/A   Obvious barriers to extubation? NA   C: Choice of Sedation RASS Goal: N/A patient not on sedation  Need for changes to sedation or analgesia regimen? NA   D: Delirium CAM-ICU: Negative   E: Early Mobility  Plan for early mobility? Yes   F: Family Engagement Plan for family engagement today? Yes       Antibiotic Review: Patient on appropriate coverage based on culture data.  and Infectious disease consulted    Review of Invasive Devices:    Ortiz Plan: voiding trial today        Prophylaxis:  VTE Contraindicated secondary to: Plt <50   Stress Ulcer  covered bypantoprazole (PROTONIX) injection 40 mg [[id number]]        Significant 24hr Events     24hr events:  Gen surg consulted for peg discussions. No PEG for now until abd wounds heal. Urology consulted for retention.      Overnight: started on insulin gtt due to uncontrolled BS despite lantus and SSI algo#3.      Subjective   Patient denies any pain or discomfort via head nodding.    Review of Systems   Unable to perform ROS: Acuity of condition        Objective                            Vitals I/O      Most Recent Min/Max in 24hrs   Temp (!) 97.2 °F (36.2 °C) Temp  Min: 96.9 °F (36.1 °C)  Max: 98.6 °F (37 °C)   Pulse (!) 131 Pulse  Min: 118  Max: 140   Resp (!) 35 Resp  Min: 25  Max: 39   /72 BP  Min: 119/61  Max: 154/82   O2 Sat 91 % SpO2  Min: 89 %  Max: 96 %     LDA  Peripherals (R, L)    Ileostomy RUQ 36d, draining bilious o/p  Wound vac  NGT   Diana, day 3  Surgical airway , cuffed   Intake/Output Summary (Last 24 hours) at 3/30/2024 0715  Last data filed at 3/30/2024 0600  Gross per 24 hour   Intake 4381.64 ml   Output 2850 ml   Net 1531.64 ml       Diet Enteral/Parenteral; Tube Feeding No Oral Diet; Vital 1.5; Continuous; 45; Prosource Protein Liquid - One Packet; OD; Refugio - One Packet; BID; 300; Water; Every 4 hours    Invasive Monitoring           Physical Exam   Physical Exam  Eyes:      Pupils: Pupils are equal, round, and reactive to light.   Skin:     General: Skin is warm and dry.   HENT:      Nose: No epistaxis.      Mouth/Throat:      Mouth: Mucous membranes are moist.   Neck:      Trachea: Tracheostomy present.   Cardiovascular:      Rate and Rhythm: Regular rhythm. Tachycardia present.      Heart sounds: No murmur heard.  Musculoskeletal:         General: No swelling.      Right lower leg: No edema.      Left lower leg: No edema.   Abdominal: General: An ostomy site is present. There is ostomy site.     Palpations: Abdomen is soft.      Tenderness: There is no abdominal tenderness.      Comments: Wound vac   Constitutional:       Appearance: She is ill-appearing.      Interventions: She is not sedated and not intubated.  Pulmonary:      Effort: No accessory muscle usage, respiratory distress or  accessory muscle usage. She is not intubated.      Breath sounds: Normal breath sounds. No wheezing.   Neurological:      General: No focal deficit present.      Mental Status: She is alert. She is calm.      Comments: Able to follow commands with head nodding,   Weak  strength BUE and Unable to assess strength due to profound weakness in all extremities    Genitourinary/Anorectal:     Comments: Ortiz draining clear yellow urine  Ortiz present.          Diagnostic Studies      EKG: no new  Imaging: no new I have personally reviewed pertinent reports.       Medications:  Scheduled PRN   chlorhexidine, 15 mL, Q12H SARTHAK  Filgrastim-aafi, 300 mcg, Daily  insulin glargine, 10 Units, QAM  insulin lispro, 1-6 Units, HS  lacosamide, 100 mg, Q12H SARTHAK  levalbuterol, 1.25 mg, TID  methylPREDNISolone sodium succinate, 40 mg, Daily  modafinil, 200 mg, Daily  nystatin, , BID  pantoprazole, 40 mg, Q12H SARTHAK  polyethylene glycol, 17 g, Daily  sodium chloride, 2 spray, BID  sodium chloride, 4 mL, TID  sulfamethoxazole-trimethoprim, 1 tablet, Once per day on Monday Wednesday Friday  topiramate, 50 mg, BID  venlafaxine, 12.5 mg, TID With Meals      acetaminophen, 650 mg, Q6H PRN  fentaNYL, 25 mcg, Q1H PRN  ondansetron, 4 mg, Q6H PRN  sodium chloride, 1 Application, Q1H PRN       Continuous    insulin regular (HumuLIN R,NovoLIN R) 1 Units/mL in sodium chloride 0.9 % 100 mL infusion, 0.3-21 Units/hr, Last Rate: 6 Units/hr (03/30/24 0551)           Labs:    CBC    Recent Labs     03/29/24  0556 03/30/24  0454   WBC 1.15* 2.72*   HGB 7.6* 7.6*   HCT 23.9* 23.6*   PLT 22* 21*   BANDSPCT 39*  --      BMP    Recent Labs     03/29/24  1752 03/30/24  0454   SODIUM 139 141   K 3.6 3.5   * 112*   CO2 19* 18*   AGAP 11 11   BUN 52* 61*   CREATININE 0.65 0.70   CALCIUM 9.8 10.0       Coags    No recent results     Additional Electrolytes  Recent Labs     03/29/24  0556 03/30/24  0454   MG 1.7* 2.8*   PHOS 3.2 4.2          Blood Gas    No  recent results  No recent results LFTs  Recent Labs     03/30/24  0454   ALT 45   AST 12*   ALKPHOS 196*   ALB 2.1*   TBILI 0.68         Infectious  No recent results     COVID ag 3/25 negative  COVID ag 3/27 negative    BAL 3/11   --4+ steno malto., 2+ candida  --Viral Cx neg  --Fungal 4+ candida  CMV neg  PCP smear neg  COVID pcr + on 3/11 Glucose  Recent Labs     03/29/24  0556 03/29/24  1752 03/30/24  0454   GLUC 257* 134 147*               Naima Bhatia MD

## 2024-03-31 LAB
ABO GROUP BLD BPU: NORMAL
ANION GAP SERPL CALCULATED.3IONS-SCNC: 12 MMOL/L (ref 4–13)
ANISOCYTOSIS BLD QL SMEAR: PRESENT
BASE EX.OXY STD BLDV CALC-SCNC: 89.9 % (ref 60–80)
BASE EX.OXY STD BLDV CALC-SCNC: 93.8 % (ref 60–80)
BASE EXCESS BLDV CALC-SCNC: -5.2 MMOL/L
BASE EXCESS BLDV CALC-SCNC: -6.4 MMOL/L
BASOPHILS # BLD MANUAL: 0 THOUSAND/UL (ref 0–0.1)
BASOPHILS NFR MAR MANUAL: 0 % (ref 0–1)
BPU ID: NORMAL
BUN SERPL-MCNC: 78 MG/DL (ref 5–25)
CALCIUM SERPL-MCNC: 10.2 MG/DL (ref 8.4–10.2)
CFFMA (FUNCTIONAL FIBRINOGEN MAX AMPLITUDE): >52 MM (ref 15–32)
CHLORIDE SERPL-SCNC: 108 MMOL/L (ref 96–108)
CKLY30: 0 % (ref 0–2.6)
CKR(REACTION TIME): 6.7 MIN (ref 4.6–9.1)
CO2 SERPL-SCNC: 21 MMOL/L (ref 21–32)
CREAT SERPL-MCNC: 0.96 MG/DL (ref 0.6–1.3)
CRP SERPL QL: 331.2 MG/L
CRTMA(RAPIDTEG MAX AMPLITUDE): 71.1 MM (ref 52–70)
DACRYOCYTES BLD QL SMEAR: PRESENT
DOHLE BOD BLD QL SMEAR: PRESENT
EOSINOPHIL # BLD MANUAL: 0 THOUSAND/UL (ref 0–0.4)
EOSINOPHIL NFR BLD MANUAL: 0 % (ref 0–6)
ERYTHROCYTE [DISTWIDTH] IN BLOOD BY AUTOMATED COUNT: 17.1 % (ref 11.6–15.1)
ERYTHROCYTE [DISTWIDTH] IN BLOOD BY AUTOMATED COUNT: 17.2 % (ref 11.6–15.1)
GFR SERPL CREATININE-BSD FRML MDRD: 65 ML/MIN/1.73SQ M
GIANT PLATELETS BLD QL SMEAR: PRESENT
GLUCOSE SERPL-MCNC: 105 MG/DL (ref 65–140)
GLUCOSE SERPL-MCNC: 110 MG/DL (ref 65–140)
GLUCOSE SERPL-MCNC: 134 MG/DL (ref 65–140)
GLUCOSE SERPL-MCNC: 135 MG/DL (ref 65–140)
GLUCOSE SERPL-MCNC: 142 MG/DL (ref 65–140)
GLUCOSE SERPL-MCNC: 145 MG/DL (ref 65–140)
GLUCOSE SERPL-MCNC: 146 MG/DL (ref 65–140)
GLUCOSE SERPL-MCNC: 149 MG/DL (ref 65–140)
GLUCOSE SERPL-MCNC: 157 MG/DL (ref 65–140)
GLUCOSE SERPL-MCNC: 160 MG/DL (ref 65–140)
GLUCOSE SERPL-MCNC: 195 MG/DL (ref 65–140)
GLUCOSE SERPL-MCNC: 203 MG/DL (ref 65–140)
GLUCOSE SERPL-MCNC: 253 MG/DL (ref 65–140)
HCO3 BLDV-SCNC: 19.5 MMOL/L (ref 24–30)
HCO3 BLDV-SCNC: 21.9 MMOL/L (ref 24–30)
HCT VFR BLD AUTO: 19.5 % (ref 34.8–46.1)
HCT VFR BLD AUTO: 20.6 % (ref 34.8–46.1)
HCT VFR BLD AUTO: 24.1 % (ref 34.8–46.1)
HGB BLD-MCNC: 6.1 G/DL (ref 11.5–15.4)
HGB BLD-MCNC: 6.5 G/DL (ref 11.5–15.4)
HGB BLD-MCNC: 7.7 G/DL (ref 11.5–15.4)
HOROWITZ INDEX BLDA+IHG-RTO: 40 MM[HG]
LYMPHOCYTES # BLD AUTO: 0.15 THOUSAND/UL (ref 0.6–4.47)
LYMPHOCYTES # BLD AUTO: 2 % (ref 14–44)
MAGNESIUM SERPL-MCNC: 2.6 MG/DL (ref 1.9–2.7)
MCH RBC QN AUTO: 30.3 PG (ref 26.8–34.3)
MCH RBC QN AUTO: 30.5 PG (ref 26.8–34.3)
MCHC RBC AUTO-ENTMCNC: 31.3 G/DL (ref 31.4–37.4)
MCHC RBC AUTO-ENTMCNC: 31.6 G/DL (ref 31.4–37.4)
MCV RBC AUTO: 97 FL (ref 82–98)
MCV RBC AUTO: 97 FL (ref 82–98)
METAMYELOCYTES NFR BLD MANUAL: 2 % (ref 0–1)
MONOCYTES # BLD AUTO: 0.11 THOUSAND/UL (ref 0–1.22)
MONOCYTES NFR BLD: 5 % (ref 4–12)
MYELOCYTES NFR BLD MANUAL: 5 % (ref 0–1)
NASAL CANNULA: 10
NEUTROPHILS # BLD MANUAL: 1.77 THOUSAND/UL (ref 1.85–7.62)
NEUTS BAND NFR BLD MANUAL: 28 % (ref 0–8)
NEUTS SEG NFR BLD AUTO: 53 % (ref 43–75)
NRBC BLD AUTO-RTO: 20 /100 WBC (ref 0–2)
O2 CT BLDV-SCNC: 11.1 ML/DL
O2 CT BLDV-SCNC: 8.6 ML/DL
OTHER FIO2: 60 %
OVALOCYTES BLD QL SMEAR: PRESENT
PCO2 BLDV: 40 MM HG (ref 42–50)
PCO2 BLDV: 52.5 MM HG (ref 42–50)
PEEP RESPIRATORY: 6 CM[H2O]
PH BLDV: 7.24 [PH] (ref 7.3–7.4)
PH BLDV: 7.3 [PH] (ref 7.3–7.4)
PHOSPHATE SERPL-MCNC: 5.5 MG/DL (ref 2.7–4.5)
PLATELET # BLD AUTO: 50 THOUSANDS/UL (ref 149–390)
PLATELET # BLD AUTO: 51 THOUSANDS/UL (ref 149–390)
PLATELET BLD QL SMEAR: ABNORMAL
PMV BLD AUTO: 11.5 FL (ref 8.9–12.7)
PMV BLD AUTO: 12.7 FL (ref 8.9–12.7)
PO2 BLDV: 67.7 MM HG (ref 35–45)
PO2 BLDV: 75.4 MM HG (ref 35–45)
POIKILOCYTOSIS BLD QL SMEAR: PRESENT
POTASSIUM SERPL-SCNC: 3.7 MMOL/L (ref 3.5–5.3)
PROCALCITONIN SERPL-MCNC: 6.28 NG/ML
RBC # BLD AUTO: 2.01 MILLION/UL (ref 3.81–5.12)
RBC # BLD AUTO: 2.13 MILLION/UL (ref 3.81–5.12)
RBC MORPH BLD: PRESENT
SODIUM SERPL-SCNC: 141 MMOL/L (ref 135–147)
UNIT DISPENSE STATUS: NORMAL
UNIT PRODUCT CODE: NORMAL
UNIT PRODUCT VOLUME: 300 ML
UNIT RH: NORMAL
VARIANT LYMPHS # BLD AUTO: 5 %
VENT AC: 15
VENT- AC: AC
VT SETTING VENT: 450 ML
WBC # BLD AUTO: 2.18 THOUSAND/UL (ref 4.31–10.16)
WBC # BLD AUTO: 2.47 THOUSAND/UL (ref 4.31–10.16)
WBC TOXIC VACUOLES BLD QL SMEAR: PRESENT

## 2024-03-31 PROCEDURE — 83735 ASSAY OF MAGNESIUM: CPT | Performed by: STUDENT IN AN ORGANIZED HEALTH CARE EDUCATION/TRAINING PROGRAM

## 2024-03-31 PROCEDURE — 84100 ASSAY OF PHOSPHORUS: CPT

## 2024-03-31 PROCEDURE — 87040 BLOOD CULTURE FOR BACTERIA: CPT

## 2024-03-31 PROCEDURE — 87077 CULTURE AEROBIC IDENTIFY: CPT

## 2024-03-31 PROCEDURE — P9040 RBC LEUKOREDUCED IRRADIATED: HCPCS

## 2024-03-31 PROCEDURE — C9113 INJ PANTOPRAZOLE SODIUM, VIA: HCPCS | Performed by: INTERNAL MEDICINE

## 2024-03-31 PROCEDURE — 87106 FUNGI IDENTIFICATION YEAST: CPT

## 2024-03-31 PROCEDURE — 85027 COMPLETE CBC AUTOMATED: CPT

## 2024-03-31 PROCEDURE — 85007 BL SMEAR W/DIFF WBC COUNT: CPT

## 2024-03-31 PROCEDURE — 87070 CULTURE OTHR SPECIMN AEROBIC: CPT

## 2024-03-31 PROCEDURE — 94640 AIRWAY INHALATION TREATMENT: CPT

## 2024-03-31 PROCEDURE — 80048 BASIC METABOLIC PNL TOTAL CA: CPT

## 2024-03-31 PROCEDURE — 82948 REAGENT STRIP/BLOOD GLUCOSE: CPT

## 2024-03-31 PROCEDURE — 87186 SC STD MICRODIL/AGAR DIL: CPT

## 2024-03-31 PROCEDURE — 87154 CUL TYP ID BLD PTHGN 6+ TRGT: CPT

## 2024-03-31 PROCEDURE — NC001 PR NO CHARGE: Performed by: STUDENT IN AN ORGANIZED HEALTH CARE EDUCATION/TRAINING PROGRAM

## 2024-03-31 PROCEDURE — 87205 SMEAR GRAM STAIN: CPT

## 2024-03-31 PROCEDURE — 84145 PROCALCITONIN (PCT): CPT

## 2024-03-31 PROCEDURE — 82805 BLOOD GASES W/O2 SATURATION: CPT | Performed by: INTERNAL MEDICINE

## 2024-03-31 PROCEDURE — 85384 FIBRINOGEN ACTIVITY: CPT

## 2024-03-31 PROCEDURE — 85014 HEMATOCRIT: CPT

## 2024-03-31 PROCEDURE — 94664 DEMO&/EVAL PT USE INHALER: CPT

## 2024-03-31 PROCEDURE — 86140 C-REACTIVE PROTEIN: CPT

## 2024-03-31 PROCEDURE — 85018 HEMOGLOBIN: CPT

## 2024-03-31 PROCEDURE — 82805 BLOOD GASES W/O2 SATURATION: CPT

## 2024-03-31 PROCEDURE — 85576 BLOOD PLATELET AGGREGATION: CPT

## 2024-03-31 PROCEDURE — 94760 N-INVAS EAR/PLS OXIMETRY 1: CPT

## 2024-03-31 PROCEDURE — 85347 COAGULATION TIME ACTIVATED: CPT

## 2024-03-31 PROCEDURE — 94669 MECHANICAL CHEST WALL OSCILL: CPT

## 2024-03-31 PROCEDURE — 94002 VENT MGMT INPAT INIT DAY: CPT | Performed by: SOCIAL WORKER

## 2024-03-31 PROCEDURE — 85397 CLOTTING FUNCT ACTIVITY: CPT

## 2024-03-31 RX ORDER — ENOXAPARIN SODIUM 100 MG/ML
40 INJECTION SUBCUTANEOUS
Status: DISCONTINUED | OUTPATIENT
Start: 2024-03-31 | End: 2024-03-31

## 2024-03-31 RX ORDER — FUROSEMIDE 10 MG/ML
80 INJECTION INTRAMUSCULAR; INTRAVENOUS
Status: DISCONTINUED | OUTPATIENT
Start: 2024-04-01 | End: 2024-04-01

## 2024-03-31 RX ORDER — FUROSEMIDE 10 MG/ML
80 INJECTION INTRAMUSCULAR; INTRAVENOUS
Status: DISCONTINUED | OUTPATIENT
Start: 2024-03-31 | End: 2024-03-31

## 2024-03-31 RX ADMIN — CEFEPIME 2000 MG: 2 INJECTION, POWDER, FOR SOLUTION INTRAVENOUS at 15:06

## 2024-03-31 RX ADMIN — Medication 2 SPRAY: at 09:02

## 2024-03-31 RX ADMIN — METHYLPREDNISOLONE SODIUM SUCCINATE 40 MG: 40 INJECTION, POWDER, FOR SOLUTION INTRAMUSCULAR; INTRAVENOUS at 09:02

## 2024-03-31 RX ADMIN — TOPIRAMATE 50 MG: 100 TABLET, FILM COATED ORAL at 18:29

## 2024-03-31 RX ADMIN — NYSTATIN: 100000 POWDER TOPICAL at 09:02

## 2024-03-31 RX ADMIN — VENLAFAXINE 12.5 MG: 25 TABLET ORAL at 09:02

## 2024-03-31 RX ADMIN — MODAFINIL 200 MG: 100 TABLET ORAL at 09:02

## 2024-03-31 RX ADMIN — VENLAFAXINE 12.5 MG: 25 TABLET ORAL at 12:02

## 2024-03-31 RX ADMIN — SODIUM CHLORIDE SOLN NEBU 3% 4 ML: 3 NEBU SOLN at 14:45

## 2024-03-31 RX ADMIN — VENLAFAXINE 12.5 MG: 25 TABLET ORAL at 18:29

## 2024-03-31 RX ADMIN — SODIUM CHLORIDE 6 UNITS/HR: 9 INJECTION, SOLUTION INTRAVENOUS at 05:38

## 2024-03-31 RX ADMIN — PANTOPRAZOLE SODIUM 40 MG: 40 INJECTION, POWDER, FOR SOLUTION INTRAVENOUS at 09:02

## 2024-03-31 RX ADMIN — TOPIRAMATE 50 MG: 100 TABLET, FILM COATED ORAL at 09:02

## 2024-03-31 RX ADMIN — LEVALBUTEROL HYDROCHLORIDE 1.25 MG: 1.25 SOLUTION RESPIRATORY (INHALATION) at 07:45

## 2024-03-31 RX ADMIN — FILGRASTIM-AAFI 300 MCG: 300 INJECTION, SOLUTION SUBCUTANEOUS at 12:01

## 2024-03-31 RX ADMIN — PANTOPRAZOLE SODIUM 40 MG: 40 INJECTION, POWDER, FOR SOLUTION INTRAVENOUS at 20:22

## 2024-03-31 RX ADMIN — LACOSAMIDE ORAL SOLUTION 100 MG: 10 SOLUTION ORAL at 20:22

## 2024-03-31 RX ADMIN — CHLORHEXIDINE GLUCONATE 0.12% ORAL RINSE 15 ML: 1.2 LIQUID ORAL at 09:02

## 2024-03-31 RX ADMIN — SODIUM CHLORIDE SOLN NEBU 3% 4 ML: 3 NEBU SOLN at 07:45

## 2024-03-31 RX ADMIN — SODIUM CHLORIDE SOLN NEBU 3% 4 ML: 3 NEBU SOLN at 19:20

## 2024-03-31 RX ADMIN — IOHEXOL 85 ML: 350 INJECTION, SOLUTION INTRAVENOUS at 09:48

## 2024-03-31 RX ADMIN — CHLORHEXIDINE GLUCONATE 0.12% ORAL RINSE 15 ML: 1.2 LIQUID ORAL at 20:17

## 2024-03-31 RX ADMIN — FUROSEMIDE 80 MG: 10 INJECTION, SOLUTION INTRAMUSCULAR; INTRAVENOUS at 16:32

## 2024-03-31 RX ADMIN — LACOSAMIDE ORAL SOLUTION 100 MG: 10 SOLUTION ORAL at 09:04

## 2024-03-31 RX ADMIN — Medication 2 SPRAY: at 20:17

## 2024-03-31 RX ADMIN — LEVALBUTEROL HYDROCHLORIDE 1.25 MG: 1.25 SOLUTION RESPIRATORY (INHALATION) at 19:20

## 2024-03-31 RX ADMIN — LEVALBUTEROL HYDROCHLORIDE 1.25 MG: 1.25 SOLUTION RESPIRATORY (INHALATION) at 14:45

## 2024-03-31 RX ADMIN — NYSTATIN: 100000 POWDER TOPICAL at 18:29

## 2024-03-31 RX ADMIN — CEFEPIME 2000 MG: 2 INJECTION, POWDER, FOR SOLUTION INTRAVENOUS at 20:52

## 2024-03-31 RX ADMIN — POLYETHYLENE GLYCOL 3350 17 G: 17 POWDER, FOR SOLUTION ORAL at 09:02

## 2024-03-31 NOTE — PLAN OF CARE
Problem: Prexisting or High Potential for Compromised Skin Integrity  Goal: Skin integrity is maintained or improved  Description: INTERVENTIONS:  - Identify patients at risk for skin breakdown  - Assess and monitor skin integrity  - Assess and monitor nutrition and hydration status  - Monitor labs   - Assess for incontinence   - Turn and reposition patient  - Assist with mobility/ambulation  - Relieve pressure over bony prominences  - Avoid friction and shearing  - Provide appropriate hygiene as needed including keeping skin clean and dry  - Evaluate need for skin moisturizer/barrier cream  - Collaborate with interdisciplinary team   - Patient/family teaching  - Consider wound care consult   Outcome: Progressing     Problem: Nutrition/Hydration-ADULT  Goal: Nutrient/Hydration intake appropriate for improving, restoring or maintaining nutritional needs  Description: Monitor and assess patient's nutrition/hydration status for malnutrition. Collaborate with interdisciplinary team and initiate plan and interventions as ordered.  Monitor patient's weight and dietary intake as ordered or per policy. Utilize nutrition screening tool and intervene as necessary. Determine patient's food preferences and provide high-protein, high-caloric foods as appropriate.     INTERVENTIONS:  - Monitor oral intake, urinary output, labs, and treatment plans  - Assess nutrition and hydration status and recommend course of action  - Evaluate amount of meals eaten  - Assist patient with eating if necessary   - Allow adequate time for meals  - Recommend/ encourage appropriate diets, oral nutritional supplements, and vitamin/mineral supplements  - Order, calculate, and assess calorie counts as needed  - Recommend, monitor, and adjust tube feedings and TPN/PPN based on assessed needs  - Assess need for intravenous fluids  - Provide specific nutrition/hydration education as appropriate  - Include patient/family/caregiver in decisions related to  nutrition  Outcome: Progressing     Problem: INFECTION - ADULT  Goal: Absence or prevention of progression during hospitalization  Description: INTERVENTIONS:  - Assess and monitor for signs and symptoms of infection  - Monitor lab/diagnostic results  - Monitor all insertion sites, i.e. indwelling lines, tubes, and drains  - Monitor endotracheal if appropriate and nasal secretions for changes in amount and color  - [place] appropriate cooling/warming therapies per order  - Administer medications as ordered  - Instruct and encourage patient and family to use good hand hygiene technique  - Identify and instruct in appropriate isolation precautions for identified infection/condition  Outcome: Progressing     Problem: SAFETY ADULT  Goal: Patient will remain free of falls  Description: INTERVENTIONS:  - Educate patient/family on patient safety including physical limitations  - Instruct patient to call for assistance with activity   - Consult OT/PT to assist with strengthening/mobility   - Keep Call bell within reach  - Keep bed low and locked with side rails adjusted as appropriate  - Keep care items and personal belongings within reach  - Initiate and maintain comfort rounds  - Make Fall Risk Sign visible to staff  - Offer Toileting every 2 Hours, in advance of need  - Initiate/Maintain bed alarm  - Obtain necessary fall risk management equipment: non skid footwear  - Apply yellow socks and bracelet for high fall risk patients  - Consider moving patient to room near nurses station  Outcome: Progressing     Problem: RESPIRATORY - ADULT  Goal: Achieves optimal ventilation and oxygenation  Description: INTERVENTIONS:  - Assess for changes in respiratory status  - Assess for changes in mentation and behavior  - Position to facilitate oxygenation and minimize respiratory effort  - Oxygen administered by appropriate delivery if ordered  - Initiate smoking cessation education as indicated  - Encourage broncho-pulmonary  hygiene including cough, deep breathe, Incentive Spirometry  - Assess the need for suctioning and aspirate as needed  - Assess and instruct to report SOB or any respiratory difficulty  - Respiratory Therapy support as indicated  Outcome: Progressing     Problem: SKIN/TISSUE INTEGRITY - ADULT  Goal: Skin Integrity remains intact(Skin Breakdown Prevention)  Description: Assess:  -Perform Flavio assessment every shift   -Clean and moisturize skin every day   -Inspect skin when repositioning, toileting, and assisting with ADLS  -Assess under medical devices such as ronny  every hour   -Assess extremities for adequate circulation and sensation     Bed Management:  -Have minimal linens on bed & keep smooth, unwrinkled  -Change linens as needed when moist or perspiring  -Avoid sitting or lying in one position for more than 2 hours while in bed  -Keep HOB at 45 degrees     Toileting:  -Offer bedside commode  -Assess for incontinence every hour  -Use incontinent care products after each incontinent episode such as moisture barrier cream     Activity:  -Mobilize patient 3 times a day  -Encourage activity and walks on unit  -Encourage or provide ROM exercises   -Turn and reposition patient every 2 Hours  -Use appropriate equipment to lift or move patient in bed  -Instruct/ Assist with weight shifting every hour when out of bed in chair  -Consider limitation of chair time 2 hour intervals    Skin Care:  -Avoid use of baby powder, tape, friction and shearing, hot water or constrictive clothing  -Relieve pressure over bony prominences using allevyn   -Do not massage red bony areas    Next Steps:  -Teach patient strategies to minimize risks such as weight shifting    -Consider consults to  interdisciplinary teams such as PT  Outcome: Progressing  Goal: Incision(s), wounds(s) or drain site(s) healing without S/S of infection  Description: INTERVENTIONS  - Assess and document dressing, incision, wound bed, drain sites and  surrounding tissue  - Provide patient and family education  - Perform skin care/dressing changes  Outcome: Progressing  Goal: Pressure injury heals and does not worsen  Description: Interventions:  - Implement low air loss mattress or specialty surface (Criteria met)  - Apply silicone foam dressing  - Instruct/assist with weight shifting every 120 minutes when in chair   - Limit chair time to 2 hour intervals  - Use special pressure reducing interventions when in chair   - Apply fecal or urinary incontinence containment device   - Perform passive or active ROM   - Turn and reposition patient & offload bony prominences every 2 hours   - Utilize friction reducing device or surface for transfers   - Consider consults to  interdisciplinary teams  - Use incontinent care products after each incontinent episode  - Consider nutrition services referral as needed  Outcome: Progressing     Problem: NEUROSENSORY - ADULT  Goal: Achieves stable or improved neurological status  Description: INTERVENTIONS  - Monitor and report changes in neurological status  - Monitor vital signs such as temperature, blood pressure, glucose, and any other labs ordered   - Initiate measures to prevent increased intracranial pressure  - Monitor for seizure activity and implement precautions if appropriate      Outcome: Progressing  Goal: Achieves maximal functionality and self care  Description: INTERVENTIONS  - Monitor swallowing and airway patency with patient fatigue and changes in neurological status  - Encourage and assist patient to increase activity and self care.   - Encourage visually impaired, hearing impaired and aphasic patients to use assistive/communication devices  Outcome: Progressing     Problem: CARDIOVASCULAR - ADULT  Goal: Maintains optimal cardiac output and hemodynamic stability  Description: INTERVENTIONS:  - Monitor I/O, vital signs and rhythm  - Monitor for S/S and trends of decreased cardiac output  - Administer and titrate  ordered vasoactive medications to optimize hemodynamic stability  - Assess quality of pulses, skin color and temperature  - Assess for signs of decreased coronary artery perfusion  - Instruct patient to report change in severity of symptoms  Outcome: Progressing  Goal: Absence of cardiac dysrhythmias or at baseline rhythm  Description: INTERVENTIONS:  - Continuous cardiac monitoring, vital signs, obtain 12 lead EKG if ordered  - Administer antiarrhythmic and heart rate control medications as ordered  - Monitor electrolytes and administer replacement therapy as ordered  Outcome: Progressing     Problem: GASTROINTESTINAL - ADULT  Goal: Minimal or absence of nausea and/or vomiting  Description: INTERVENTIONS:  - Administer IV fluids if ordered to ensure adequate hydration  - Maintain NPO status until nausea and vomiting are resolved  - Nasogastric tube if ordered  - Administer ordered antiemetic medications as needed  - Provide nonpharmacologic comfort measures as appropriate  - Advance diet as tolerated, if ordered  - Consider nutrition services referral to assist patient with adequate nutrition and appropriate food choices  Outcome: Progressing  Goal: Maintains or returns to baseline bowel function  Description: INTERVENTIONS:  - Assess bowel function  - Encourage oral fluids to ensure adequate hydration  - Administer IV fluids if ordered to ensure adequate hydration  - Administer ordered medications as needed  - Encourage mobilization and activity  - Consider nutritional services referral to assist patient with adequate nutrition and appropriate food choices  Outcome: Progressing  Goal: Maintains adequate nutritional intake  Description: INTERVENTIONS:  - Monitor percentage of each meal consumed  - Identify factors contributing to decreased intake, treat as appropriate  - Assist with meals as needed  - Monitor I&O, weight, and lab values if indicated  - Obtain nutrition services referral as needed  Outcome:  Progressing  Goal: Establish and maintain optimal ostomy function  Description: INTERVENTIONS:  - Assess bowel function  - Encourage oral fluids to ensure adequate hydration  - Administer IV fluids if ordered to ensure adequate hydration   - Administer ordered medications as needed  - Encourage mobilization and activity  - Nutrition services referral to assist patient with appropriate food choices  - Assess stoma site  - Consider wound care consult   Outcome: Progressing  Goal: Oral mucous membranes remain intact  Description: INTERVENTIONS  - Assess oral mucosa and hygiene practices  - Implement preventative oral hygiene regimen  - Implement oral medicated treatments as ordered  - Initiate Nutrition services referral as needed  Outcome: Progressing     Problem: METABOLIC, FLUID AND ELECTROLYTES - ADULT  Goal: Electrolytes maintained within normal limits  Description: INTERVENTIONS:  - Monitor labs and assess patient for signs and symptoms of electrolyte imbalances  - Administer electrolyte replacement as ordered  - Monitor response to electrolyte replacements, including repeat lab results as appropriate  - Instruct patient on fluid and nutrition as appropriate  Outcome: Progressing  Goal: Fluid balance maintained  Description: INTERVENTIONS:  - Monitor labs   - Monitor I/O and WT  - Instruct patient on fluid and nutrition as appropriate  - Assess for signs & symptoms of volume excess or deficit  Outcome: Progressing  Goal: Glucose maintained within target range  Description: INTERVENTIONS:  - Monitor Blood Glucose as ordered  - Assess for signs and symptoms of hyperglycemia and hypoglycemia  - Administer ordered medications to maintain glucose within target range  - Assess nutritional intake and initiate nutrition service referral as needed  Outcome: Progressing     Problem: HEMATOLOGIC - ADULT  Goal: Maintains hematologic stability  Description: INTERVENTIONS  - Assess for signs and symptoms of bleeding or  hemorrhage  - Monitor labs  - Administer supportive blood products/factors as ordered and appropriate  Outcome: Progressing     Problem: MUSCULOSKELETAL - ADULT  Goal: Maintain or return mobility to safest level of function  Description: INTERVENTIONS:  - Assess patient's ability to carry out ADLs; assess patient's baseline for ADL function and identify physical deficits which impact ability to perform ADLs (bathing, care of mouth/teeth, toileting, grooming, dressing, etc.)  - Assess/evaluate cause of self-care deficits   - Assess range of motion  - Assess patient's mobility  - Assess patient's need for assistive devices and provide as appropriate  - Encourage maximum independence but intervene and supervise when necessary  - Involve family in performance of ADLs  - Assess for home care needs following discharge   - Consider OT consult to assist with ADL evaluation and planning for discharge  - Provide patient education as appropriate  Outcome: Progressing     Problem: COPING  Goal: Pt/Family able to verbalize concerns and demonstrate effective coping strategies  Description: INTERVENTIONS:  - Assist patient/family to identify coping skills, available support systems and cultural and spiritual values  - Provide emotional support, including active listening and acknowledgement of concerns of patient and caregivers  - Reduce environmental stimuli, as able  - Provide patient education  - Assess for spiritual pain/suffering and initiate spiritual care, including notification of Pastoral Care or nat[place] based community as needed  - Assess effectiveness of coping strategies  Outcome: Progressing  Goal: Will report anxiety at manageable levels  Description: INTERVENTIONS:  - Administer medication as ordered  - Teach and encourage coping skills  - Provide emotional support  - Assess patient/family for anxiety and ability to cope  Outcome: Progressing     Problem: BEHAVIOR  Goal: Pt/Family maintain appropriate behavior and  adhere to behavioral management agreement, if implemented  Description: INTERVENTIONS:  - Assess the family dynamic   - Encourage verbalization of thoughts and concerns in a socially appropriate manner  - Assess patient/family's coping skills and non-compliant behavior (including use of illegal substances).  - Utilize positive, consistent limit setting strategies supporting safety of patient, staff and others  - Initiate consult with Case Management, Spiritual Care or other ancillary services as appropriate  - If a patient's/visitor's behavior jeopardizes the safety of the patient, staff, or others, refer to organization procedure.   - Notify Security of behavior or suspected illegal substances which indicate the need for search of the patient and/or belongings  - Encourage participation in the decision making process about a behavioral management agreement; implement if patient meets criteria  Outcome: Progressing     Problem: Potential for Falls  Goal: Patient will remain free of falls  Description: INTERVENTIONS:  - Educate patient/family on patient safety including physical limitations  - Instruct patient to call for assistance with activity   - Consult OT/PT to assist with strengthening/mobility   - Keep Call bell within reach  - Keep bed low and locked with side rails adjusted as appropriate  - Keep care items and personal belongings within reach  - Initiate and maintain comfort rounds  - Make Fall Risk Sign visible to staff  - Offer Toileting every 2 Hours, in advance of need  - Initiate/Maintain bed alarm  - Obtain necessary fall risk management equipment: non skid footwear  - Apply yellow socks and bracelet for high fall risk patients  - Consider moving patient to room near nurses station  Outcome: Progressing     Problem: MOBILITY - ADULT  Goal: Maintain or return to baseline ADL function  Description: INTERVENTIONS:  -  Assess patient's ability to carry out ADLs; assess patient's baseline for ADL function  and identify physical deficits which impact ability to perform ADLs (bathing, care of mouth/teeth, toileting, grooming, dressing, etc.)  - Assess/evaluate cause of self-care deficits   - Assess range of motion  - Assess patient's mobility; develop plan if impaired  - Assess patient's need for assistive devices and provide as appropriate  - Encourage maximum independence but intervene and supervise when necessary  - Involve family in performance of ADLs  - Assess for home care needs following discharge   - Consider OT consult to assist with ADL evaluation and planning for discharge  - Provide patient education as appropriate  Outcome: Progressing  Goal: Maintains/Returns to pre admission functional level  Description: INTERVENTIONS:  - Perform AM-PAC 6 Click Basic Mobility/ Daily Activity assessment daily.  - Set and communicate daily mobility goal to care team and patient/family/caregiver.   - Collaborate with rehabilitation services on mobility goals if consulted  - Perform Range of Motion 3 times a day.  - Reposition patient every 2 hours.  - Dangle patient 3 times a day  - Stand patient 3 times a day  - Ambulate patient 3 times a day  - Out of bed to chair 3 times a day   - Out of bed for meals 3 times a day  - Out of bed for toileting  - Record patient progress and toleration of activity level   Outcome: Progressing

## 2024-03-31 NOTE — PROGRESS NOTES
[facility]  Progress Note: Critical Care  Name: Carla Hunt 59 y.o. female I MRN: [id number]  Unit/Bed#: MICU 12 I Date of Admission: 1/10/2024   Date of Service: 3/31/2024 I Hospital Day: 81    Assessment/Plan   Acute hypoxic respiratory failure; s/p tracheostomy 3/12  Bilateral ground glass opacities, persistent, improving  Pancytopenia- multifactorial including hx b-cell lymphoma, persistent inflammation  Acute on chronic anemia- multifactorial-intermittent blood loss from ostomy site, chronic inflammation, iatrogenic, marrow dysfunction in setting of persistent inflammation   Lymphopenia with bandemia, in setting of high dose corticosteroids  Severe Metabolic encephalopathy, multifactorial including ?uremia, improving  Uremia, ?catabolic from steroids, improving  Oropharyngeal dysphagia, NPO on TF, in setting of multiple prolonged intubations  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; complicated by poor wound healing  Wound dehiscence 2/2 poor wound healing s/p wound vac 3/13  B-Cell lymphoma, s/p chemotheraphy 2022, Rituxan maintenance therapy on hold 2/2 persistent covid-19  Seizure disorder; on vimpat  Steroid induced hyperglycemia;on insulin gtt  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  Hx of complex migraines s/p L temporal lobectomy 2007 complicated by #18  Hx of complex seizures in setting of #17, on AEDs  COVID-19 infection POA; resolved; s/p multiple courses of antivirals 2/2 persistent infection,  most recent Remdesivir course completed 3/15, superimposed by recurrent PNA s/p multiple courses Abx  Stenotrophomonas PNA; recurrent, previously on Bactrim, switched to minocycline 2/2 ELIESER now resolved, completed 14d course 3/27  Minimal SAH POA, no interventions required, resolved on repeat CTH     Neuro:    Diagnosis: Alertness  -Continue modafinil 200mg qd delerium precautions  Diagnosis: Analgesia  - PRN Fentanyl  25mcg/hr; on holiday for frequent neurochecks     Diagnosis: Metabolic encephalopathy; POA, improving  -Waxing and waning neuro exam since admission  -Most recent repeat CTH 3/13 negative for acute intracranial findings.  Has had extensive neurological workup including MRI/CT neuroimaging, LP with unremarkable findings  -Now remains off sedation. Electrolytes, sodium, Hyperglycemia 2/2 high dose steroids requiring insulin gtt. Ammonia normal. May be prolonged 2/2 PNA, possibly worsened by uremic encephalopathy worsened by ELIESER now resolved, uremia improving; candida growth seen on BAL Cx from 3/11, may be respiraotry colonization, however currently without signs of systemic fungal infection but is at high risk 2/2 lymphopenia, depressed immunoglobulins in setting of B cell lymphoma and high dose corticosteroids.   Plan:  -Fu stat CTH  -Fu VBG, CXR, CRP due to change in mental status  -Slow steroid wean: currently IV Solumedrol 40mg BID 7d course  -S/P 14d Remdesivir course to tx COVID-19, completed 3/15  -S/P 14d Minocycline course to tx stenotrophomonas PNA, completed 3/27   -Continue modafinil as above  -Avoid PRN fentanyl/sedative meds as able.  -Delirium precautions  -Frequent neurochecks  -Defer MRI brain due to clinical improvement, not likely to      Diagnosis: seizure disorder, known history  -S/p partial left temporal lobe resection in 2007 2/2 complex migraines  -On Lamictal 150 bid and Topamax 50mg bid at home  -Last lamotrigine level from 03/02 at 10.7 (therapeutic)  -Home Lamictal switched to Vimpat 3/27 due to worsening pancytopenia, neuro following  -Continue Vimpat 100mg bid maintenance dose  -Continue home topiramate 50mg bid  -Seizure precautions      Diagnosis: Anxiety, known history  -On Effexor 12.5 mg TID     CV:  -Diagnosis: Sinus tachycardia  -TTE 3/17 with no major structural dysfunction  -Multifactorial 2/2 deconditioning, dehydration/hypvol, acute on chronic anemia,  underlying infectious/inflammatory process, pain, Modafinal-induced  Plan:  - See plans under Pulm, Heme/Onc, ID, MSK    Pulm:  Diagnosis: Acute hypoxic respiratory failure;  Diagnosis: Bilateral ground glass opacities, improving  -Vent dependent; s/p trach 3/12 after was reintubated 3/11 due to increased WOB  -Persistently COVID+ since admission s/p multiple courses of antivirals (most recent completed 3/15), complicated by recurrent bacterial PNA treated w 10d levaquin (completed 2/25) for MDR PNA; most recent BAL +recurrent stenotrophomona treated with Bactrim, swithced to minocyline 14d course completed 3/28.  -Etiology Likely multifactorial including possible COVID pneumonitis vs recurrent PNA vs hypersensitivity pneumonitis 2/2 ?rituxumab. Persistent inflammation likely given patient has been steroid responsive throughout admission.   -CRP increasing but likely from intraabdominal inflammation as patient is noted to have clinical improvement with weaning of steroids, ABx.   -Repeat CXR 3/22 with significant improvement of multifocal PNA. Repeat COVID ag negative x2 3/27  -Follow up anti-Rituximab ab  Plan:  -S/P 14d Remdesivir course to tx COVID-19, completed 3/15  -S/P 14d Minocycline course to tx stenotrophomonas PNA, completed 3/27   -IV diuresis on 3/30, UO 2.2L; f/u CXR today  -Steroid wean 40mg qd x7d, decrease by 10mg qd dosing x7d  -Hold off on starting empiric antifungal given clinical improvement  -Continue trach collar, wean O2 as able     GI:  Diagnosis: Partial cecal volvulus s/p right hemicolectomy with ostomy pouch in place  -Complicated by poor wound healing of midline incision as evidenced by wound dehiscence s/p wound vac that was removed 3/17 by gen surg.  -Stoma with dark brown output, consistent with prior  Plan:  -Wound vac as per general surgery  -Monitor ostomy pouch output  -Surgery following, will keep them updated if any further changes     Diagnosis: oropharyngeal dysphagia   -Has  had multiple and prolonged intubations now s/p trach   -VBS 3/27 with oropharyngeal dysphagia  -Speech therapy to begin neuromuscular electrical stim/NMES/VitalStim,   -Continue tube feeds for now until PEG placement pending abdominal wound healing as per Gen Surg    :  Diagnosis: Hypernatremia, resolved with D5  Sodium 140 today (corrected for )  -Hold D5W   -Free water flushes to 300cc q4h     Diagnosis: uremia, improving  -?catabolic from steroids, may also have ?slow UGIB in setting of prolonged steroids though no overt s/s of GIB and H&H stable since 3/23 and patient is on ppi  -Trending down  -Will consider GI consultation for potential EGD inpatient if clinical s/s UGIB     Diagnosis: CKD III,   -Baseline Cr  0.8-1.2  -Cr now stable and at baseline.   -UO adequate, on Ortiz, draining clear yellow urine  -Prerenal azotemia 2/2 uremia,   Plan:  -Trend daily BMP  -Continue to monitor I/O and UOP  -Avoid nephrotoxins, contrast dye as able  -See plan under uremia     F/E/N:  F: none  E: monitor and replete for goal K>4, P>3, Mg>2  N: tube feeds with vital 1.5; 45 cc/h, Prosource liquid protein to 1 packet BID, Refugio BID to support wound healing       Heme/Onc:  Diagnosis: Pancytopenia  -In setting of hx of B cell lymphoma, prior chemo, Rituxan therapy, prior abx and present meds including lamictal  -Hemo onc consulted, empirically given Filgastrim 300mg qd x3d (complated 3/30); IR bone marrow bx deferred by heme-onc   -Lamictal switched to Vimpat in consultation with neuro as above due to potential contribution to pancytopenia  -Trend CBC and diff daily, neutropenic precautions for ANC <500    Diagnosis: Acute on chronic anemia  -Baseline Hgb ~7-8. S/P 2U PRBCs 3/17 after repeat Hgb 5.3, total of 6U transfuse  d since admission.  -Patient had significant blood loss from ostomy site 3/20, resulting in hemorraghic shock, improved with blood transfusion; hemostasis achieved after bleeding source identified  and sutured. Another large vol danie bloody output from osotomy on 3/21, bleeding source within ostomy site, sutured.    -Also having intermittent vaginal bleeding  -May still have slow GIB due to persistent uremia, GI previously consulted for EGD but was deferred due to hemodynamic instability at the time  -?Worsened by impaired marrow response liekly 2/2 persistent inflammation due to low retic index ~1 prior to most recent transfusions; normocytic with normal B12/folate levels; MCV<100. Iatrogenic cause likely contributing 2/2 frequent blood draws; chronic anemia likely persistent inflammatory state/CKD/lymphoma in setting of elevated ferritin of 974. SPEP/UPEP negative. Hemolysis workup negative.   Plan:  -Transfuse 1U prbc's now, follow up H&H thereafter  -Routine monitoring ostomy output, if bleeding, apply direct pressure and contact gen surg STAT for hemostasis .  -Continue tube feeds/nutritional support  -Trend CBC, transfuse Transfuse with leukoreduced and irradiated RBCs to maintain Hgb>7  -See plan under pancytopenia     Diagnosis: Thrombocytopenia  -Likely multifactorial including imparied production from marrow dysfunction in setting of persistent inflammation; RI low suggestive of hyperproliferation, hx of B-cell lymphoma, recent infections including persistent COVID, PNA treated, CMV negative. No known history of vWD/congenital disorders. No liver dysfunction; recent hepatic profile unremarkable. HIT workup negative, Hemolytic workup negative. No s/s DIC. No mechanical valves. ?Primary ITP.  Plan:  -Filgrastim 300mg x3 to aid in plt recovery  -Transfuse with leukoreduced and irradiated PLTs if count <20k due to increased risk of bleeding   -Goal >50k  -Hold Lovenox for DVT ppx, resume once repeat H&H>7  -Resume DVT PPx if Plt>50k  -See plan under pancytopenia      Diagnosis Lymphopenia with bandemia  -In setting of high dose steroids  -Severely depressed immunoglobulins inclding IgG, IgA, IgM  -At  risk for further infections  -Filgrastim 300mg x3 to aid in plt recovery  -Contact and airborne precautions    Diagnosis: B cell lymphoma  -[facility]  -S/P 6 cycles of chemotherapy in 20222  -Maintained on Rituxan for 2 year course PTA, started May 2022, last dose was 12/2024, treatment currently on hold in setting of persistent COVID-19 infection  Anti-Ritux Ab negative    Plan:  -Holding rituximab in setting of persistent COVID-19 infection, now resolved.  ?resume rituxubmab pending clinical stability & anti-ritux ab status in consultation with heme-onc     DVT ppx: hold lovenox 2/2 thrombocytopenia     Endo:  Diagnosis: steroid hyperglycemia and diabetes mellitus type 2, A1c at 6.6 from 01/2024  -Goal -180  -Insulin gtt     ID:  Diagnosis: Recurrent bacterial pneumonia  - Patient has completed multiple treatment courses of remdesivir throughout hospitalization in addition to 7 days of ceftriaxone.  - BAL cultures in 2/2024 positive for MDR Pseudomonas and stenotrophomonas treated with 10d course of Levaquin  - CT C/A/P 3/10 with persistent groundglass opacities and changes suggestive of sequelae of COVID, prior infections.  Completed 10d course of cefepime for broad spectrum coveragge (completed (3/13)  -Repeat BAL cultures from 3/11 showing 4+ growth Stenotrophomonas maltophilia. Could be colonization given prior BAL growth of same, however due to recent intubation with prolonged corticosteroid theraphy, will begin treating as true pathogen. Patient otherwise remains afebrile, no leukocytosis. CRP with down trending.  Previously on Bactrim from 3/13 to 3/18, discontinued due to worsening ELIESER  Plan:  -S/P 14d Minocycline course to tx stenotrophomonas PNA, completed 3/27   -IV steroids as above     MSK/Skin:  Diagnosis: Midline incision wound with poor wound healing-  -General surgery performed staple removal of the patient's midline incision on 3/12 with some skin signs appreciated at  the inferior aspect of the wound without obvious pus drainage. Overnight 3/13, wound dehiscence progressed requiring general surgery reevaluation. Red/brown aspirate noted, fascia intact. Wet-to-dry dressings in place. General surgery following for next Epson wound care.  -Poor wound healing in setting of high-dose steroid therapy.  No  exam no obvious signs of infection at this time.   -S/P wound vac replacement 3/13 as per gen surgery. No active concerns for cellulitis.  Plan:  -Wound VAC management as per general surgery  -Wound care for peritracheostomy wound  -Abdominal wound care as per general surgery  -Routine monitoring     Diagnosis: Critical illness myopathy  -Likely exacerbated by high-dose steroid therapy  Plan:  -Continue to wean steroids as above  -Aggressive PT/OT         Disposition: Critical care    ICU Core Measures     Vented Patient  VAP Bundle  VAP bundle ordered     A: Assess, Prevent, and Manage Pain Has pain been assessed? Yes  Need for changes to pain regimen? NA   B: Both Spontaneous Awakening Trials (SATs) and Spontaneous Breathing Trials (SBTs) Plan to perform spontaneous awakening trial today? N/A   Plan to perform spontaneous breathing trial today? N/A   Obvious barriers to extubation? NA   C: Choice of Sedation RASS Goal: N/A patient not on sedation  Need for changes to sedation or analgesia regimen? NA   D: Delirium CAM-ICU: Negative   E: Early Mobility  Plan for early mobility? Yes   F: Family Engagement Plan for family engagement today? Yes       Antibiotic Review: Patient on appropriate coverage based on culture data.  and Infectious disease consulted    Review of Invasive Devices:    Ortiz Plan: voiding trial today        Prophylaxis:  VTE Contraindicated secondary to: Plt <50   Stress Ulcer  covered bypantoprazole (PROTONIX) injection 40 mg [[id number]]        Significant 24hr Events       Overnight: NAOE. Intermittent vaginal bleeding noted.      Subjective   Patient denies any  pain or discomfort via head nodding.    Review of Systems   Unable to perform ROS: Acuity of condition        Objective                            Vitals I/O      Most Recent Min/Max in 24hrs   Temp 98.2 °F (36.8 °C) Temp  Min: 97.6 °F (36.4 °C)  Max: 99.2 °F (37.3 °C)   Pulse (!) 135 Pulse  Min: 121  Max: 136   Resp (!) 29 Resp  Min: 29  Max: 41   /64 BP  Min: 114/59  Max: 146/65   O2 Sat 100 % SpO2  Min: 91 %  Max: 100 %     LDA  Peripherals (R, L)    Ileostomy RUQ 36d, draining bilious o/p  Wound vac  NGT   Ortiz  Surgical airway , cuffed   Intake/Output Summary (Last 24 hours) at 3/31/2024 0854  Last data filed at 3/31/2024 0554  Gross per 24 hour   Intake 2843.23 ml   Output 2325 ml   Net 518.23 ml       Diet Enteral/Parenteral; Tube Feeding No Oral Diet; Vital 1.5; Continuous; 45; Prosource Protein Liquid - One Packet; OD; Refugio - One Packet; BID; 300; Water; Every 4 hours    Invasive Monitoring           Physical Exam   Physical Exam  Eyes:      Pupils: Pupils are equal, round, and reactive to light.   Skin:     General: Skin is warm and dry.   HENT:      Nose: No epistaxis.      Mouth/Throat:      Mouth: Mucous membranes are moist.   Neck:      Trachea: Tracheostomy present.   Cardiovascular:      Rate and Rhythm: Regular rhythm. Tachycardia present.      Heart sounds: No murmur heard.  Musculoskeletal:         General: No swelling.      Right lower leg: No edema.      Left lower leg: No edema.   Abdominal: General: An ostomy site is present. There is ostomy site.     Palpations: Abdomen is soft.      Tenderness: There is no abdominal tenderness.      Comments: Wound vac   Constitutional:       Appearance: She is ill-appearing.      Interventions: She is not sedated and not intubated.  Pulmonary:      Effort: Tachypnea present. No accessory muscle usage, respiratory distress or accessory muscle usage. She is not intubated.      Breath sounds: Normal breath sounds.   Neurological:      General: No focal  deficit present.      Comments: Lethargic appearing  and Unable to assess strength due to profound weakness in all extremities    Genitourinary/Anorectal:     Comments: Ortiz draining clear yellow urine  Ortiz present.          Diagnostic Studies      EKG: no new  Imaging: no new I have personally reviewed pertinent reports.       Medications:  Scheduled PRN   chlorhexidine, 15 mL, Q12H SARTHAK  Filgrastim-aafi, 300 mcg, Daily  lacosamide, 100 mg, Q12H SARTHAK  levalbuterol, 1.25 mg, TID  methylPREDNISolone sodium succinate, 40 mg, Daily  modafinil, 200 mg, Daily  nystatin, , BID  pantoprazole, 40 mg, Q12H SARTHAK  polyethylene glycol, 17 g, Daily  sodium chloride, 2 spray, BID  sodium chloride, 4 mL, TID  sulfamethoxazole-trimethoprim, 1 tablet, Once per day on Monday Wednesday Friday  topiramate, 50 mg, BID  venlafaxine, 12.5 mg, TID With Meals      acetaminophen, 650 mg, Q6H PRN  fentaNYL, 25 mcg, Q1H PRN  ondansetron, 4 mg, Q6H PRN  sodium chloride, 1 Application, Q1H PRN       Continuous    insulin regular (HumuLIN R,NovoLIN R) 1 Units/mL in sodium chloride 0.9 % 100 mL infusion, 0.3-21 Units/hr, Last Rate: 3 Units/hr (03/31/24 0613)           Labs:    CBC    Recent Labs     03/30/24  0454 03/31/24  0044 03/31/24  0545   WBC 2.72* 2.18* 2.47*   HGB 7.6* 6.5* 6.1*   HCT 23.6* 20.6* 19.5*   PLT 21* 50* 51*   BANDSPCT 29* 28*  --      BMP    Recent Labs     03/30/24  0454 03/31/24  0545   SODIUM 141 141   K 3.5 3.7   * 108   CO2 18* 21   AGAP 11 12   BUN 61* 78*   CREATININE 0.70 0.96   CALCIUM 10.0 10.2       Coags    No recent results     Additional Electrolytes  Recent Labs     03/30/24  0454 03/31/24  0545   MG 2.8* 2.6   PHOS 4.2 5.5*          Blood Gas    No recent results  Recent Labs     03/30/24  0847   PHVEN 7.301   SKO0ESG 40.0*   PO2VEN 46.8*   YIX5MLN 19.3*   BEVEN -6.6   [id number] 79.3    LFTs  Recent Labs     03/30/24  0454   ALT 45   AST 12*   ALKPHOS 196*   ALB 2.1*   TBILI 0.68         Infectious  No  recent results     COVID ag 3/25 negative  COVID ag 3/27 negative    BAL 3/11   --4+ steno malto., 2+ candida  --Viral Cx neg  --Fungal 4+ candida  CMV neg  PCP smear neg  COVID pcr + on 3/11 Glucose  Recent Labs     03/29/24  1752 03/30/24  0454 03/31/24  0545   GLUC 134 147* 105               Joe Lazcano DO

## 2024-03-31 NOTE — PROGRESS NOTES
Critical Care Attending Note; Bharat Márquez   Note Date: 24  Note Time: 11:54 AM    Patient: Carla Hunt  Age, : 59 y.o., 1965 MRN: [id number] Code Status: Level 1 - Full Code Patient Location: MICU 12/MICU 12   Hospital LOS:81 days  ]   Patient seen and examined, medical record reviewed, discussed with house staff and nursing staff.     HPI   CC: Respiratory Failure   59F with a PMH of B - Cell Lymphoma(Bendamustine-Rituximab x 6 cycles [21 - 1/3/22], maintenance Rituximab), Epilepsy(Temporal Lobe resection- Complex Migraines) originally admitted for AMS found to have COVID, she has had a very complicated course requiring multiple intubations, volvulus(Ex Lap - ileostomy - wound dehiscence), hemorrhagic shock,  PNAs.    Key Recent Events   : [facility] - AMS, COVID - Steroid protocol  1/10: Transfer [facility] Video EEG  : LP   1/15: LP  : Bronch  : Pulse Dose steroids - 3 days   Intubated - Epistaxis  2/15: IVIG Completed  : Bronch - COVID + Antigen/PCR  : Volvulus - ex lap, ileocecectomy, end ileostomy and mucus fistula creation   :  Bronch   : Bronch  3/1: Extubated  3/11: Re-intubated, Bronch - Stenotrophomonas/Pseudo  3/12: Bronch  3/12: Trach  3/13: BAL - Stenotrophomonas   3/15: 14d course Paxlovid/remdesivir  3/17: Bronch  3/20: Hemorrhagic shock - Ostomy - 2PRBCs - OR -   3/21: Hemorrhage Ostomy/WV- 1 PRBCs - OR -  3/30: No acute events overnight  3/31: More lethargic       Main ICU Plans:       #Neuro  Anxiety/Alertness  - Venlanfaxine  - Modafinil    Seizure disorder - Adjusted AED therapy due to pancytopenia  - Vimpat, Topimax  - Neurology Consult     #Pulm  Hypoxic Respiratory Failure - COVID, Pneumonitis, Rituximab - Improved but failed weaning steroids. Extended steroid course and has been on steroids for > 1 month. Plan for slower wean from steroids.  CT chest shows new consolidative infiltrate concerning for infection with improving previous  pneumonitis. Due to fatigue will rest on ventilator.  - TCT daily - rest on PPV   - Diuresis - Goal -2L  - Wean Solumedrol  40mg qday - recommend slow wean q7days by 25%  - Bactrim PPX  - Send tracheal asparate - low threshold to initiate antibiotics     Pulmonary Hygiene   - Continue Chest PT, Hypertonic Saline    #Renal  Hypernatremia - Resolved    Urinary Retention/Hematuria - Likely traumatic marie in the setting of thrombocytopenia  - Marie - Failed voiding trial  - Urology eval     #GI  Volvulus -   - General surgery following  - Ostomy/Wound Care    Severe Protein Calorie malnutrition - Patient unlikely to maintain enough oral intake to keep up with calorie demand  - Recommend PEG once stable - will discuss with surgery who wants to hold off on PEG due to wound healing   - Swallow eval - NMS   - Aggressive PT/OT      #ID   Stenotrophomonas PNA  - ID Consulted - Minocycline - Completed 14 day course    COVID PNA - Extended steroid/antiviral course requiring pulse dose steroids with improvement in respiratory function. Treatment difficult due to underlying immunosuppression  - Steroids as above      #MSK  Steroid/Critical Care Neuropathy  - PT/OT     #Heme  Anemia - concerns for PUD(no melena, BRPR, hematemesis), maybe due to BM suppression but with new vaginal bleeding/hematuria  - Monitor   - GI Consulted - holding off on EGD     Thrombocytopenia/Neutropenia - Likely consumptive/BM suppression - possibly due to anti-epileptics(transitioned off Lamictal), doxycycline. Neutrophil count improving with GCSF  - Heme consulted - Filgastrum  - PLT >50 due to hematuria/vaginal bleeding     #Endo  DM  - insulin gtt    #DVT/GI ppx  Hold SQH     #Lines/Tubes/Drains:   Invasive Devices       Peripheral Intravenous Line  Duration             Long-Dwell Peripheral IV (Midline) 03/08/24 Left Cephalic Vein 22 days    Peripheral IV 03/30/24 Right Antecubital 1 day              Drain  Duration             Ileostomy RUQ 39 days     NG/OG/Enteral Tube Nasogastric 14 Fr Right nare 26 days    Urethral Catheter Double-lumen 4 days              Airway  Duration             Surgical Airway Shiley Cuffed 18 days                    #Nutrition:   Diet Enteral/Parenteral; Tube Feeding No Oral Diet; Vital 1.5; Continuous; 45; Prosource Protein Liquid - One Packet; OD; Refugio - One Packet; BID; 300; Water; Every 4 hours        #Code Status:   Level 1 - Full Code    #Dispo:   ICU        Bharat Márquez MD  Pulmonary, Critical Care    Critical care time, excluding procedures, teaching, family meetings, and excludes any prior time recorded by the AP/resident, 35 minutes. Upon my evaluation, this patient has a high probability of imminent or life-threatening deterioration due to above problems which required my direct attention, intervention, and personal management.   Impression/Active Problems:    Acute hypoxic respiratory failure ventilator dependent- s/p tracheostomy  Bilateral ground glass opacities- likely persistent PNA and superimposed bacterial infection  Persistent COVID Pna with superimposed bacterial pna- w/ stenotrophomonas  Acute blood loss anemia- from stoma site  Hemorrhagic shock  Severe encephalopathy- possibly due to uremia  Cutaneous ulcer- below the trach site, likely due to pressure.   Stenotrophomonas PNA  Sepsis- 2/2 persistent pneumonia  Volvulus- post hemicolectomy s/p cecal ostomy. Noted stomal retraction  Shock- unclear etiology  Thrombocytopenia  Bcell lymphoma- on rituximab  Steroid induced hyperglycemia  Minimal SAH POA  Seizure disorder  Upper extremity thrombophlebitis  Severe lymphopenia  Hx of migraines- s/p left temporal lobectomy  Hypogammaglobulinemia  Critical illness myopathy  Hematuria  Neutropenia    Physical Exam:     Vital Signs:   Weight: 71.7 kg (158 lb 1.1 oz)  IBW: Ideal body weight: 63.9 kg (140 lb 14 oz)  Adjusted ideal body weight: 67 kg (147 lb 12 oz)  Temp:  [97.6 °F (36.4 °C)-99.2 °F (37.3 °C)] 98.7 °F  (37.1 °C)  HR:  [121-138] 135  Resp:  [29-41] 38  BP: (114-146)/(57-79) 140/79  FiO2 (%):  [50-60] 50  Physical Exam:  Unchanged  General: NAD  Neuro: More fatigued  Heart: RRR  Lungs: Basilar crackles  Abdomen: Midline - Wound vac - CDI, Ostomy, NT  Extremities: Edema worsening                Ventilator Settings:    FiO2 (%):  [50-60] 50    Results from last 7 days   Lab Units 03/31/24  0854 03/30/24  0847   PO2 NICA mm Hg 67.7* 46.8*       Radiologic Images Reviewed:    CXR  Mild prominent interstitial markings. No pneumothorax or pleural effusion.     Normal cardiomediastinal silhouette.     Bones are unremarkable for age.     Normal upper abdomen.     IMPRESSION:     Mild congestive changes.    CT C/A/P  IMPRESSION:  1. Persistent bilateral groundglass and nodular consolidation with peripheral opacification at the right greater than left lung bases. Findings remain concerning for multi lobar infiltrates with possible superimposed COVID-19 opacities.  2. Status post ileocolectomy with a right lower quadrant ileostomy again exhibiting a patent stoma. Persistent inflammatory change and subcutaneous emphysema after recent intervention. No drainable collection.  3. Bilateral renal calcifications again suggestive of medullary sponge kidney with persistent mild right renal fullness but no evidence of distal obstructive pathology.        Input / Output:     Intake/Output Summary (Last 24 hours) at 3/31/2024 1154  Last data filed at 3/31/2024 1008  Gross per 24 hour   Intake 2645.31 ml   Output 1515 ml   Net 1130.31 ml            Infusions:  insulin regular (HumuLIN R,NovoLIN R) 1 Units/mL in sodium chloride 0.9 % 100 mL infusion, 0.3-21 Units/hr, Last Rate: 6 Units/hr (03/31/24 1008)        Scheduled Medications:  Current Facility-Administered Medications   Medication Dose Route Frequency Provider Last Rate    acetaminophen  650 mg Oral Q6H PRN Moises Bowden MD      chlorhexidine  15 mL Mouth/Throat Q12H [facility] Moises Bowden MD    "   fentaNYL  25 mcg Intravenous Q1H PRN Joe Lazcano DO      Filgrastim-aafi  300 mcg Subcutaneous Daily Cosmo Sin DO      insulin regular (HumuLIN R,NovoLIN R) 1 Units/mL in sodium chloride 0.9 % 100 mL infusion  0.3-21 Units/hr Intravenous Titrated Ajit Oquendo MD 6 Units/hr (03/31/24 1008)    lacosamide  100 mg Oral Q12H [facility] Sharifa Preciado PA-C      levalbuterol  1.25 mg Nebulization TID Moises Bowden MD      methylPREDNISolone sodium succinate  40 mg Intravenous Daily Joe Lazcano DO      modafinil  200 mg Per NG Tube Daily Joe Lazcano DO      nystatin   Topical BID Moises Bowden MD      ondansetron  4 mg Intravenous Q6H PRN Moises Bowden MD      pantoprazole  40 mg Intravenous Q12H [facility] Moises Bowden MD      polyethylene glycol  17 g Per NG Tube Daily Moises Bowden MD      sodium chloride  1 Application Nasal Q1H PRN Moises Bowden MD      sodium chloride  2 spray Each Nare BID Emilie Soyeon Kim, MD      sodium chloride  4 mL Nebulization TID Moises Bowden MD      sulfamethoxazole-trimethoprim  1 tablet Oral Once per day on Monday Wednesday Friday Joe Lazcano DO      topiramate  50 mg Per PEG Tube BID Moises Bowden MD      venlafaxine  12.5 mg Oral TID With Meals Moises Bowden MD         PRN Medications:    acetaminophen    fentaNYL    ondansetron    sodium chloride    Labs Reviewed:  Results from last 7 days   Lab Units 03/31/24  0545 03/31/24  0044 03/30/24  0454   WBC Thousand/uL 2.47* 2.18* 2.72*   HEMOGLOBIN g/dL 6.1* 6.5* 7.6*   HEMATOCRIT % 19.5* 20.6* 23.6*   PLATELETS Thousands/uL 51* 50* 21*      Results from last 7 days   Lab Units 03/31/24  0545 03/30/24  0454 03/29/24  1752 03/29/24  0556   SODIUM mmol/L 141 141 139 137   CO2 mmol/L 21 18* 19* 18*   BUN mg/dL 78* 61* 52* 56*   CALCIUM mg/dL 10.2 10.0 9.8 9.5   MAGNESIUM mg/dL 2.6 2.8*  --  1.7*   PHOSPHORUS mg/dL 5.5* 4.2  --  3.2         Invalid input(s): \"ASTSGOT\", \"ALTSGPT\"LABRCNTIP@ ,alkphos:3,tbilirubin:3,dbilirubin:3)@              Invalid input(s): \"TROPT\", " "\"PBNP\"             I have personally seen and examined the patient on (03/31/24 between [id number]). I discussed the patient with the AP/resident including, but not limited to, verifying findings; reviewing labs and x-rays; discussing with consultants; developing the plan of care with the bedside nurse; and discussing treatment plan with patient or surrogate.  I have reviewed the note and assessment performed by the AP/resident and agree with the AP/resident’s documented findings and plan of care with the above additions/exceptions. Please see my comments for details and adjustments.                 "

## 2024-04-01 LAB
25(OH)D3 SERPL-MCNC: 25.5 NG/ML (ref 30–100)
ABO GROUP BLD BPU: NORMAL
ANION GAP SERPL CALCULATED.3IONS-SCNC: 13 MMOL/L (ref 4–13)
ANISOCYTOSIS BLD QL SMEAR: PRESENT
ANISOCYTOSIS BLD QL SMEAR: PRESENT
APICAL FOUR CHAMBER EJECTION FRACTION: 61 %
APTT PPP: 35 SECONDS (ref 23–37)
BASE EX.OXY STD BLDV CALC-SCNC: 78.6 % (ref 60–80)
BASE EXCESS BLDV CALC-SCNC: -6.9 MMOL/L
BASOPHILS # BLD MANUAL: 0 THOUSAND/UL (ref 0–0.1)
BASOPHILS # BLD MANUAL: 0 THOUSAND/UL (ref 0–0.1)
BASOPHILS NFR MAR MANUAL: 0 % (ref 0–1)
BASOPHILS NFR MAR MANUAL: 0 % (ref 0–1)
BPU ID: NORMAL
BSA FOR ECHO PROCEDURE: 1.83 M2
BUN SERPL-MCNC: 104 MG/DL (ref 5–25)
BURR CELLS BLD QL SMEAR: PRESENT
C ALBICANS DNA BLD POS QL NAA+NON-PROBE: DETECTED
CALCIUM SERPL-MCNC: 10.6 MG/DL (ref 8.4–10.2)
CHLORIDE SERPL-SCNC: 109 MMOL/L (ref 96–108)
CO2 SERPL-SCNC: 21 MMOL/L (ref 21–32)
CREAT SERPL-MCNC: 1.24 MG/DL (ref 0.6–1.3)
CROSSMATCH: NORMAL
DACRYOCYTES BLD QL SMEAR: PRESENT
DACRYOCYTES BLD QL SMEAR: PRESENT
DIFFERENTIAL COMMENT: ABNORMAL
DOHLE BOD BLD QL SMEAR: PRESENT
DOHLE BOD BLD QL SMEAR: PRESENT
EOSINOPHIL # BLD MANUAL: 0 THOUSAND/UL (ref 0–0.4)
EOSINOPHIL # BLD MANUAL: 0.02 THOUSAND/UL (ref 0–0.4)
EOSINOPHIL NFR BLD MANUAL: 0 % (ref 0–6)
EOSINOPHIL NFR BLD MANUAL: 1 % (ref 0–6)
ERYTHROCYTE [DISTWIDTH] IN BLOOD BY AUTOMATED COUNT: 17.8 % (ref 11.6–15.1)
ERYTHROCYTE [DISTWIDTH] IN BLOOD BY AUTOMATED COUNT: 19.1 % (ref 11.6–15.1)
FIBRINOGEN PPP-MCNC: 1061 MG/DL (ref 207–520)
FRACTIONAL SHORTENING: 35 (ref 28–44)
GFR SERPL CREATININE-BSD FRML MDRD: 47 ML/MIN/1.73SQ M
GIANT PLATELETS BLD QL SMEAR: PRESENT
GLUCOSE SERPL-MCNC: 108 MG/DL (ref 65–140)
GLUCOSE SERPL-MCNC: 109 MG/DL (ref 65–140)
GLUCOSE SERPL-MCNC: 117 MG/DL (ref 65–140)
GLUCOSE SERPL-MCNC: 117 MG/DL (ref 65–140)
GLUCOSE SERPL-MCNC: 121 MG/DL (ref 65–140)
GLUCOSE SERPL-MCNC: 127 MG/DL (ref 65–140)
GLUCOSE SERPL-MCNC: 131 MG/DL (ref 65–140)
GLUCOSE SERPL-MCNC: 133 MG/DL (ref 65–140)
GLUCOSE SERPL-MCNC: 140 MG/DL (ref 65–140)
GLUCOSE SERPL-MCNC: 145 MG/DL (ref 65–140)
GLUCOSE SERPL-MCNC: 147 MG/DL (ref 65–140)
GLUCOSE SERPL-MCNC: 153 MG/DL (ref 65–140)
GLUCOSE SERPL-MCNC: 84 MG/DL (ref 65–140)
GLUCOSE SERPL-MCNC: 93 MG/DL (ref 65–140)
GLUCOSE SERPL-MCNC: 96 MG/DL (ref 65–140)
HCO3 BLDV-SCNC: 19.9 MMOL/L (ref 24–30)
HCT VFR BLD AUTO: 20.7 % (ref 34.8–46.1)
HCT VFR BLD AUTO: 22.6 % (ref 34.8–46.1)
HGB BLD-MCNC: 6.7 G/DL (ref 11.5–15.4)
HGB BLD-MCNC: 7.3 G/DL (ref 11.5–15.4)
HOROWITZ INDEX BLDA+IHG-RTO: 40 MM[HG]
IGA SERPL-MCNC: 57 MG/DL (ref 66–433)
IGG SERPL-MCNC: 212 MG/DL (ref 635–1741)
IGM SERPL-MCNC: <20 MG/DL (ref 45–281)
INR PPP: 1.75 (ref 0.84–1.19)
INTERVENTRICULAR SEPTUM IN DIASTOLE (PARASTERNAL SHORT AXIS VIEW): 1.1 CM
INTERVENTRICULAR SEPTUM: 1.1 CM (ref 0.6–1.1)
LEFT ATRIUM SIZE: 1.3 CM
LEFT INTERNAL DIMENSION IN SYSTOLE: 2.6 CM (ref 2.1–4)
LEFT VENTRICULAR INTERNAL DIMENSION IN DIASTOLE: 4 CM (ref 3.5–6)
LEFT VENTRICULAR POSTERIOR WALL IN END DIASTOLE: 1.1 CM
LEFT VENTRICULAR STROKE VOLUME: 45 ML
LG PLATELETS BLD QL SMEAR: PRESENT
LVSV (TEICH): 45 ML
LYMPHOCYTES # BLD AUTO: 0.02 THOUSAND/UL (ref 0.6–4.47)
LYMPHOCYTES # BLD AUTO: 0.21 THOUSAND/UL (ref 0.6–4.47)
LYMPHOCYTES # BLD AUTO: 1 % (ref 14–44)
LYMPHOCYTES # BLD AUTO: 2 % (ref 14–44)
MAGNESIUM SERPL-MCNC: 2.5 MG/DL (ref 1.9–2.7)
MCH RBC QN AUTO: 30.5 PG (ref 26.8–34.3)
MCH RBC QN AUTO: 31.2 PG (ref 26.8–34.3)
MCHC RBC AUTO-ENTMCNC: 32.3 G/DL (ref 31.4–37.4)
MCHC RBC AUTO-ENTMCNC: 32.4 G/DL (ref 31.4–37.4)
MCV RBC AUTO: 95 FL (ref 82–98)
MCV RBC AUTO: 96 FL (ref 82–98)
METAMYELOCYTES NFR BLD MANUAL: 26 % (ref 0–1)
METAMYELOCYTES NFR BLD MANUAL: 8 % (ref 0–1)
MONOCYTES # BLD AUTO: 0.22 THOUSAND/UL (ref 0–1.22)
MONOCYTES # BLD AUTO: 0.32 THOUSAND/UL (ref 0–1.22)
MONOCYTES NFR BLD: 6 % (ref 4–12)
MONOCYTES NFR BLD: 9 % (ref 4–12)
MV E'TISSUE VEL-LAT: 11 CM/S
MV E'TISSUE VEL-SEP: 14 CM/S
MYELOCYTES NFR BLD MANUAL: 6 % (ref 0–1)
MYELOCYTES NFR BLD MANUAL: 8 % (ref 0–1)
NEUTROPHILS # BLD MANUAL: 1.8 THOUSAND/UL (ref 1.85–7.62)
NEUTROPHILS # BLD MANUAL: 2.59 THOUSAND/UL (ref 1.85–7.62)
NEUTS BAND NFR BLD MANUAL: 10 % (ref 0–8)
NEUTS BAND NFR BLD MANUAL: 37 % (ref 0–8)
NEUTS SEG NFR BLD AUTO: 36 % (ref 43–75)
NEUTS SEG NFR BLD AUTO: 39 % (ref 43–75)
NRBC BLD AUTO-RTO: 2 /100 WBCS
NRBC BLD AUTO-RTO: 20 /100 WBC (ref 0–2)
NRBC BLD AUTO-RTO: 24 /100 WBC (ref 0–2)
O2 CT BLDV-SCNC: 9.7 ML/DL
OVALOCYTES BLD QL SMEAR: PRESENT
OVALOCYTES BLD QL SMEAR: PRESENT
PATHOLOGY REVIEW: YES
PCO2 BLDV: 46.6 MM HG (ref 42–50)
PEEP RESPIRATORY: 6 CM[H2O]
PH BLDV: 7.25 [PH] (ref 7.3–7.4)
PHOSPHATE SERPL-MCNC: 5.5 MG/DL (ref 2.7–4.5)
PLATELET # BLD AUTO: 36 THOUSANDS/UL (ref 149–390)
PLATELET # BLD AUTO: 45 THOUSANDS/UL (ref 149–390)
PLATELET BLD QL SMEAR: ABNORMAL
PLATELET BLD QL SMEAR: ABNORMAL
PO2 BLDV: 47.6 MM HG (ref 35–45)
POIKILOCYTOSIS BLD QL SMEAR: PRESENT
POIKILOCYTOSIS BLD QL SMEAR: PRESENT
POLYCHROMASIA BLD QL SMEAR: PRESENT
POLYCHROMASIA BLD QL SMEAR: PRESENT
POTASSIUM SERPL-SCNC: 3.6 MMOL/L (ref 3.5–5.3)
PROCALCITONIN SERPL-MCNC: 7.19 NG/ML
PROMYELOCYTES NFR BLD MANUAL: 2 % (ref 0–0)
PROMYELOCYTES NFR BLD MANUAL: 7 % (ref 0–0)
PROTHROMBIN TIME: 20.1 SECONDS (ref 11.6–14.5)
PTH-INTACT SERPL-MCNC: 71.4 PG/ML (ref 12–88)
RA PRESSURE ESTIMATED: 3 MMHG
RBC # BLD AUTO: 2.15 MILLION/UL (ref 3.81–5.12)
RBC # BLD AUTO: 2.39 MILLION/UL (ref 3.81–5.12)
RBC MORPH BLD: PRESENT
RBC MORPH BLD: PRESENT
RIGHT VENTRICLE ID DIMENSION: 3.6 CM
SCHISTOCYTES BLD QL SMEAR: PRESENT
SL CV LV EF: 70
SL CV PED ECHO LEFT VENTRICLE DIASTOLIC VOLUME (MOD BIPLANE) 2D: 70 ML
SL CV PED ECHO LEFT VENTRICLE SYSTOLIC VOLUME (MOD BIPLANE) 2D: 25 ML
SODIUM SERPL-SCNC: 143 MMOL/L (ref 135–147)
T4 FREE SERPL-MCNC: 3.35 NG/DL (ref 0.61–1.12)
TRICUSPID ANNULAR PLANE SYSTOLIC EXCURSION: 1.6 CM
TSH SERPL DL<=0.05 MIU/L-ACNC: 0.04 UIU/ML (ref 0.45–4.5)
UNIT DISPENSE STATUS: NORMAL
UNIT PRODUCT CODE: NORMAL
UNIT PRODUCT VOLUME: 350 ML
UNIT RH: NORMAL
VARIANT LYMPHS # BLD AUTO: 2 %
VENT AC: 16
VENT- AC: AC
VT SETTING VENT: 450 ML
WBC # BLD AUTO: 5.29 THOUSAND/UL (ref 4.31–10.16)
WBC # BLD AUTO: 5.73 THOUSAND/UL (ref 4.31–10.16)
WBC TOXIC VACUOLES BLD QL SMEAR: PRESENT

## 2024-04-01 PROCEDURE — 94760 N-INVAS EAR/PLS OXIMETRY 1: CPT

## 2024-04-01 PROCEDURE — 82948 REAGENT STRIP/BLOOD GLUCOSE: CPT

## 2024-04-01 PROCEDURE — 84443 ASSAY THYROID STIM HORMONE: CPT

## 2024-04-01 PROCEDURE — NC001 PR NO CHARGE: Performed by: SURGERY

## 2024-04-01 PROCEDURE — 82784 ASSAY IGA/IGD/IGG/IGM EACH: CPT

## 2024-04-01 PROCEDURE — 85027 COMPLETE CBC AUTOMATED: CPT

## 2024-04-01 PROCEDURE — 99233 SBSQ HOSP IP/OBS HIGH 50: CPT | Performed by: STUDENT IN AN ORGANIZED HEALTH CARE EDUCATION/TRAINING PROGRAM

## 2024-04-01 PROCEDURE — 99233 SBSQ HOSP IP/OBS HIGH 50: CPT | Performed by: PHYSICIAN ASSISTANT

## 2024-04-01 PROCEDURE — 80048 BASIC METABOLIC PNL TOTAL CA: CPT

## 2024-04-01 PROCEDURE — P9037 PLATE PHERES LEUKOREDU IRRAD: HCPCS

## 2024-04-01 PROCEDURE — 94664 DEMO&/EVAL PT USE INHALER: CPT

## 2024-04-01 PROCEDURE — 94669 MECHANICAL CHEST WALL OSCILL: CPT

## 2024-04-01 PROCEDURE — 83970 ASSAY OF PARATHORMONE: CPT

## 2024-04-01 PROCEDURE — 85007 BL SMEAR W/DIFF WBC COUNT: CPT

## 2024-04-01 PROCEDURE — 99291 CRITICAL CARE FIRST HOUR: CPT | Performed by: INTERNAL MEDICINE

## 2024-04-01 PROCEDURE — 94640 AIRWAY INHALATION TREATMENT: CPT

## 2024-04-01 PROCEDURE — 84100 ASSAY OF PHOSPHORUS: CPT

## 2024-04-01 PROCEDURE — 82306 VITAMIN D 25 HYDROXY: CPT

## 2024-04-01 PROCEDURE — 86480 TB TEST CELL IMMUN MEASURE: CPT | Performed by: STUDENT IN AN ORGANIZED HEALTH CARE EDUCATION/TRAINING PROGRAM

## 2024-04-01 PROCEDURE — 85384 FIBRINOGEN ACTIVITY: CPT

## 2024-04-01 PROCEDURE — 94003 VENT MGMT INPAT SUBQ DAY: CPT

## 2024-04-01 PROCEDURE — C9113 INJ PANTOPRAZOLE SODIUM, VIA: HCPCS | Performed by: INTERNAL MEDICINE

## 2024-04-01 PROCEDURE — 84439 ASSAY OF FREE THYROXINE: CPT

## 2024-04-01 PROCEDURE — NC001 PR NO CHARGE: Performed by: UROLOGY

## 2024-04-01 PROCEDURE — 85610 PROTHROMBIN TIME: CPT

## 2024-04-01 PROCEDURE — 82805 BLOOD GASES W/O2 SATURATION: CPT

## 2024-04-01 PROCEDURE — 99232 SBSQ HOSP IP/OBS MODERATE 35: CPT | Performed by: INTERNAL MEDICINE

## 2024-04-01 PROCEDURE — 85027 COMPLETE CBC AUTOMATED: CPT | Performed by: STUDENT IN AN ORGANIZED HEALTH CARE EDUCATION/TRAINING PROGRAM

## 2024-04-01 PROCEDURE — 85730 THROMBOPLASTIN TIME PARTIAL: CPT

## 2024-04-01 PROCEDURE — 83735 ASSAY OF MAGNESIUM: CPT | Performed by: STUDENT IN AN ORGANIZED HEALTH CARE EDUCATION/TRAINING PROGRAM

## 2024-04-01 PROCEDURE — 85060 BLOOD SMEAR INTERPRETATION: CPT | Performed by: STUDENT IN AN ORGANIZED HEALTH CARE EDUCATION/TRAINING PROGRAM

## 2024-04-01 PROCEDURE — 99232 SBSQ HOSP IP/OBS MODERATE 35: CPT | Performed by: PHYSICIAN ASSISTANT

## 2024-04-01 PROCEDURE — 84145 PROCALCITONIN (PCT): CPT | Performed by: STUDENT IN AN ORGANIZED HEALTH CARE EDUCATION/TRAINING PROGRAM

## 2024-04-01 RX ORDER — VANCOMYCIN HYDROCHLORIDE 1 G/200ML
1000 INJECTION, SOLUTION INTRAVENOUS EVERY 24 HOURS
Status: DISCONTINUED | OUTPATIENT
Start: 2024-04-01 | End: 2024-04-02

## 2024-04-01 RX ORDER — SODIUM CHLORIDE, SODIUM GLUCONATE, SODIUM ACETATE, POTASSIUM CHLORIDE, MAGNESIUM CHLORIDE, SODIUM PHOSPHATE, DIBASIC, AND POTASSIUM PHOSPHATE .53; .5; .37; .037; .03; .012; .00082 G/100ML; G/100ML; G/100ML; G/100ML; G/100ML; G/100ML; G/100ML
500 INJECTION, SOLUTION INTRAVENOUS ONCE
Status: COMPLETED | OUTPATIENT
Start: 2024-04-01 | End: 2024-04-01

## 2024-04-01 RX ADMIN — METHYLPREDNISOLONE SODIUM SUCCINATE 40 MG: 40 INJECTION, POWDER, FOR SOLUTION INTRAMUSCULAR; INTRAVENOUS at 08:29

## 2024-04-01 RX ADMIN — LEVALBUTEROL HYDROCHLORIDE 1.25 MG: 1.25 SOLUTION RESPIRATORY (INHALATION) at 13:36

## 2024-04-01 RX ADMIN — LACOSAMIDE ORAL SOLUTION 100 MG: 10 SOLUTION ORAL at 20:20

## 2024-04-01 RX ADMIN — LACOSAMIDE ORAL SOLUTION 100 MG: 10 SOLUTION ORAL at 08:47

## 2024-04-01 RX ADMIN — IOHEXOL 100 ML: 350 INJECTION, SOLUTION INTRAVENOUS at 13:08

## 2024-04-01 RX ADMIN — CEFEPIME 2000 MG: 2 INJECTION, POWDER, FOR SOLUTION INTRAVENOUS at 18:17

## 2024-04-01 RX ADMIN — Medication 2 SPRAY: at 08:35

## 2024-04-01 RX ADMIN — SODIUM CHLORIDE 3 UNITS/HR: 9 INJECTION, SOLUTION INTRAVENOUS at 04:20

## 2024-04-01 RX ADMIN — POLYETHYLENE GLYCOL 3350 17 G: 17 POWDER, FOR SOLUTION ORAL at 08:34

## 2024-04-01 RX ADMIN — VANCOMYCIN HYDROCHLORIDE 1750 MG: 1 INJECTION, POWDER, LYOPHILIZED, FOR SOLUTION INTRAVENOUS at 10:29

## 2024-04-01 RX ADMIN — SODIUM CHLORIDE SOLN NEBU 3% 4 ML: 3 NEBU SOLN at 13:36

## 2024-04-01 RX ADMIN — MODAFINIL 200 MG: 100 TABLET ORAL at 08:31

## 2024-04-01 RX ADMIN — TOPIRAMATE 50 MG: 100 TABLET, FILM COATED ORAL at 18:10

## 2024-04-01 RX ADMIN — NYSTATIN: 100000 POWDER TOPICAL at 08:34

## 2024-04-01 RX ADMIN — SODIUM CHLORIDE SOLN NEBU 3% 4 ML: 3 NEBU SOLN at 07:19

## 2024-04-01 RX ADMIN — SODIUM CHLORIDE SOLN NEBU 3% 4 ML: 3 NEBU SOLN at 19:13

## 2024-04-01 RX ADMIN — NYSTATIN: 100000 POWDER TOPICAL at 18:12

## 2024-04-01 RX ADMIN — SODIUM CHLORIDE, SODIUM GLUCONATE, SODIUM ACETATE, POTASSIUM CHLORIDE, MAGNESIUM CHLORIDE, SODIUM PHOSPHATE, DIBASIC, AND POTASSIUM PHOSPHATE 500 ML: .53; .5; .37; .037; .03; .012; .00082 INJECTION, SOLUTION INTRAVENOUS at 13:22

## 2024-04-01 RX ADMIN — VANCOMYCIN HYDROCHLORIDE 1000 MG: 1 INJECTION, SOLUTION INTRAVENOUS at 22:17

## 2024-04-01 RX ADMIN — VENLAFAXINE 12.5 MG: 25 TABLET ORAL at 12:03

## 2024-04-01 RX ADMIN — VENLAFAXINE 12.5 MG: 25 TABLET ORAL at 08:32

## 2024-04-01 RX ADMIN — VENLAFAXINE 12.5 MG: 25 TABLET ORAL at 18:11

## 2024-04-01 RX ADMIN — LEVALBUTEROL HYDROCHLORIDE 1.25 MG: 1.25 SOLUTION RESPIRATORY (INHALATION) at 07:19

## 2024-04-01 RX ADMIN — TOPIRAMATE 50 MG: 100 TABLET, FILM COATED ORAL at 08:41

## 2024-04-01 RX ADMIN — FENTANYL CITRATE 25 MCG: 50 INJECTION INTRAMUSCULAR; INTRAVENOUS at 23:03

## 2024-04-01 RX ADMIN — PANTOPRAZOLE SODIUM 40 MG: 40 INJECTION, POWDER, FOR SOLUTION INTRAVENOUS at 20:14

## 2024-04-01 RX ADMIN — PANTOPRAZOLE SODIUM 40 MG: 40 INJECTION, POWDER, FOR SOLUTION INTRAVENOUS at 08:25

## 2024-04-01 RX ADMIN — FENTANYL CITRATE 25 MCG: 50 INJECTION INTRAMUSCULAR; INTRAVENOUS at 09:06

## 2024-04-01 RX ADMIN — LEVALBUTEROL HYDROCHLORIDE 1.25 MG: 1.25 SOLUTION RESPIRATORY (INHALATION) at 19:13

## 2024-04-01 RX ADMIN — CEFEPIME 2000 MG: 2 INJECTION, POWDER, FOR SOLUTION INTRAVENOUS at 04:48

## 2024-04-01 RX ADMIN — CHLORHEXIDINE GLUCONATE 0.12% ORAL RINSE 15 ML: 1.2 LIQUID ORAL at 08:25

## 2024-04-01 RX ADMIN — MICAFUNGIN SODIUM 100 MG: 50 INJECTION, POWDER, LYOPHILIZED, FOR SOLUTION INTRAVENOUS at 22:03

## 2024-04-01 RX ADMIN — SULFAMETHOXAZOLE AND TRIMETHOPRIM 1 TABLET: 800; 160 TABLET ORAL at 10:26

## 2024-04-01 RX ADMIN — CHLORHEXIDINE GLUCONATE 0.12% ORAL RINSE 15 ML: 1.2 LIQUID ORAL at 20:10

## 2024-04-01 RX ADMIN — Medication 2 SPRAY: at 20:10

## 2024-04-01 NOTE — WOUND OSTOMY CARE
Consult Note - Wound   Carla Hunt 59 y.o. female MRN: [id number]  Unit/Bed#: MICU 10 Encounter: [id number]        Assessment:   Patient is seen for wound care follow-up. Patient seen lying on critical care low air loss mattress. Dependent for all care needs. Mod/max assist for turning and repositioning. Bowel managed with ostomy. Urine managed via internal urinary catheter. On vent with trach in place. On tube feeding through peg tube. Nutrition following patient.  and daughter present at bedside for assessment. Patient remains inappropriate for ostomy teaching at this time.    Wound care re-consulted for fistula/wound vac. Mid abdominal wound being managed via surgery. Recommend offsetting ostomy pouch away from midline wound vac. Also recommend use of fecal management pouch instead of an ostomy pouch: recommend to apply 3m to sofie-stomal skin, apply 4 inch darius ring around ostomy, apply external fecal management system around ostomy (offsetting pouch to sit away from midline abdomen wound). Primary RN made aware of application/ reasoning.     Findings:  B/L heels are dry intact and sakshi with no skin loss or wounds present. Recommend continuing with preventative silicone bordered foam dressings and proper offloading/ repositioning.  B/L offloading boots in place.     HA B/L Sacro-Buttocks Evolving DTPI: sacral and left buttock wounds are converged and are measured as one wound. Irregular in shape area of full thickness skin loss. Wound bed is mix of dark purple non-blanchable tissue and beefy red bleeding tissue. Sofie-wound is fragile and pink in color. Scant sanguinous drainage noted. Recommend switching to xeroform and foam dressing to area.   Erosion to Trach Site: irregular in shape area of full thickness skin loss. Wound bed is mix of yellow slough and pink tissue with drainage present. Sofie-wound is fragile and pink in color. Small serosanguineous drainage noted. Recommend melgisorb ag and  mepilex up dressing for area.   HA Right Proximal Thigh/Ischium Evolving DTPI: irregular in shape area of partial thickness skin loss. Wound bed is mix of 20% purple non-blanchable tissue, 50% yellow tissue and 30% beefy red bleeding tissue. Sofie-wound is fragile. Scant sanguinous drainage noted. Recommend xeroform and silicone bordered foam dressing to area.       No induration, fluctuance, odor, warmth/temperature differences, redness, or purulence noted to the above noted wounds and skin areas assessed. New dressings applied per orders listed below. Patient tolerated well- no s/s of non-verbal pain or discomfort observed during the encounter. Bedside nurse aware of plan of care. See flow sheets for more detailed assessment findings.      Orders listed below and wound care will continue to follow, call or tiger text with questions.       Skin Care Plan:  1-B/L Sacro-Buttocks & Right Proximal Thigh/Ischium Wounds: Cleanse with soap and water and pat dry. Apply xeroform to wound beds and cover with silicone bordered foam dressings. Darren with T for Treatment and change every other day or PRN soilage/displacement.  2-Turn/reposition q2h or when medically stable for pressure re-distribution on skin .  3-Elevate heels to offload pressure. Apply Offloading boots to b/l feet.   4-Moisturize skin daily with skin nourishing cream  5-Cleanse b/l groin with soap and water, pat dry, and dust the skin lightly with nystatin powder per order. May use alginate rope to skin folds if skin is open or macerated. Change BID and PRN.   6-Cleanse B/L Heels with soap and water. Pat dry. Apply Silicone Border Foam (Mepilex) to areas. Darren with P for Prevention and change every 3 days or PRN soilage/displacement. Peel back and inspect Q-shift.  7-Trach Wound: cleanse with NSS and pat dry. Apply Melgisorb Ag around trach site and cover with Mepilex Up Dressing (cut a T into dressings to form into spilt gauze shape). Change daily or Prn  soilage/displacement.       WOUNDS:  Wound 02/12/24 Pressure Injury Sacrum (Active)   Wound Image   04/01/24 1354   Wound Description Beefy red;Light purple;Non-blanchable erythema 04/01/24 1354   Pressure Injury Stage DTPI 04/01/24 1354   Sofie-wound Assessment Pink;Fragile 04/01/24 1354   Wound Length (cm) 4.5 cm 04/01/24 1354   Wound Width (cm) 6 cm 04/01/24 1354   Wound Depth (cm) 0.2 cm 04/01/24 1354   Wound Surface Area (cm^2) 27 cm^2 04/01/24 1354   Wound Volume (cm^3) 5.4 cm^3 04/01/24 1354   Calculated Wound Volume (cm^3) 5.4 cm^3 04/01/24 1354   Change in Wound Size % -3500 04/01/24 1354   Wound Site Closure WILL 04/01/24 1354   Drainage Amount Scant 04/01/24 1354   Drainage Description Sanguineous 04/01/24 1354   Non-staged Wound Description Full thickness 04/01/24 1354   Treatments Cleansed;Irrigation with NSS;Site care 04/01/24 1354   Dressing Foam, Silicon (eg. Allevyn, etc);Xeroform 04/01/24 1354   Wound packed? No 04/01/24 1354   Packing- # removed 0 04/01/24 1354   Packing- # inserted 0 04/01/24 1354   Dressing Changed New 04/01/24 1354   Patient Tolerance Tolerated well 04/01/24 1354   Dressing Status Clean;Dry;Intact 04/01/24 1354       Wound 03/22/24 Other (comment) Neck Anterior (Active)   Wound Image   04/01/24 1347   Wound Description Pink;Drainage;Yellow 04/01/24 1400   Pressure Injury Stage O 04/01/24 1400   Sofie-wound Assessment Fragile 04/01/24 1400   Wound Length (cm) 1.5 cm 04/01/24 1400   Wound Width (cm) 1.7 cm 04/01/24 1400   Wound Depth (cm) 0.2 cm 04/01/24 1400   Wound Surface Area (cm^2) 2.55 cm^2 04/01/24 1400   Wound Volume (cm^3) 0.51 cm^3 04/01/24 1400   Calculated Wound Volume (cm^3) 0.51 cm^3 04/01/24 1400   Change in Wound Size % -70 04/01/24 1400   Wound Site Closure WILL 04/01/24 1400   Drainage Amount Small 04/01/24 1400   Drainage Description Serosanguineous 04/01/24 1400   Non-staged Wound Description Full thickness 04/01/24 1400   Treatments Cleansed;Irrigation with  NSS;Site care 04/01/24 1400   Dressing Calcium Alginate;Other (Comment) 04/01/24 1400   Wound packed? No 04/01/24 1400   Packing- # removed 0 04/01/24 1400   Packing- # inserted 0 04/01/24 1400   Dressing Changed Changed 04/01/24 1400   Patient Tolerance Tolerated well 04/01/24 1400   Dressing Status Clean;Dry;Intact 04/01/24 1400       Wound 04/01/24 Pressure Injury Thigh Posterior;Proximal;Right (Active)   Wound Image   04/01/24 1352   Wound Description Beefy red;Bleeding;Yellow;Light purple;Non-blanchable erythema 04/01/24 1352   Pressure Injury Stage U 04/01/24 1352   Sofie-wound Assessment Fragile 04/01/24 1352   Wound Length (cm) 1.5 cm 04/01/24 1352   Wound Width (cm) 1 cm 04/01/24 1352   Wound Depth (cm) 0.1 cm 04/01/24 1352   Wound Surface Area (cm^2) 1.5 cm^2 04/01/24 1352   Wound Volume (cm^3) 0.15 cm^3 04/01/24 1352   Calculated Wound Volume (cm^3) 0.15 cm^3 04/01/24 1352   Drainage Amount Scant 04/01/24 1352   Drainage Description Sanguineous 04/01/24 1352   Non-staged Wound Description Partial thickness 04/01/24 1352   Treatments Cleansed;Irrigation with NSS;Site care 04/01/24 1352   Dressing Xeroform;Foam, Silicon (eg. Allevyn, etc) 04/01/24 1352   Dressing Changed New 04/01/24 1352   Patient Tolerance Tolerated well 04/01/24 1352   Dressing Status Clean;Dry;Intact 04/01/24 1352                Ya Flannery RN, BSN, CWOCN

## 2024-04-01 NOTE — PROGRESS NOTES
Medical Oncology/Hematology Progress Note  Carla Hunt, female, 59 y.o., 1965,  MICU 10/MICU 10, [id number]     Assessment and Plan  1. Teto marginal zone lymphoma, stage IV, with 14q+ and 18q+   2. Acute Pancytopenia    Patient's Pancytopenia is most likely multifactorial, including patient's history of marginal zone lymphoma with bone marrow involvement, history of Bendamustine (alkylating agent) more than the Rituximab, also patient with recent complex multiple inciting events and illness as listed above including the surgery, hemorrhagic shock and multiple lines of antibiotics including cephalosporins. Patient was also on Lamotrigine for seizure disorder, which was reported to possible cause severe cases of neutropenia/ agranulocytosis.     Biopsy was initially recommended but unable to obtain given patient intubated and there was technical difficulty to have it done and to have the patient in prone position.  Deferred.     S/p filgrastim X5 doses 3/27 - 3/31/24 with good response, WBC today 5.29, was as low as 0.90 on 3/26     Hemoglobin was 6.1 on 3/31 s/p 1 unit RBCs then 7.7 and today 7.3 ; folic acid and B12 WNL, there is iron deficiency component but deferring IV iron now given critically ill and on antibiotic.  Workup negative for hemolysis.    Platelets was 21 on 3/30 s/p 1 unit then up to 50, 51 and today platelets down to 36.  Suspected multifactorial as above, no evidence of hemolysis, less likely ITP, and regardless patient is already on high-dose steroid, lately on Solu-Medrol 40 mg IV daily.     will hold off further G-CSF at that moment; continue to monitor daily CBC with differential and transfuse if hemoglobin less than 7 or platelets less than 20 with a higher threshold platelet less than 50 if evidence of bleeding.     Critical care team following and managing.     HPI  59 y.o. 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      ___________________________    Outpatient follow up plan: To be determined pending clinical course  Communication with team:  Did communicate with critical care team.  I did review this patient with Dr. Agustin and they are in agreement with this plan.          Subjective:  Sister on the phonePatient was seen and examined today with my attending Dr. Agustin, sedated and intubated in the ICU unit, could not obtain review of system/not communicating.     Objective:     Medication Administration - last 24 hours from 03/31/2024 1349 to 04/01/2024 1349         Date/Time Order Dose Route Action Action by     04/01/2024 0834 EDT nystatin (MYCOSTATIN) powder -- Topical Given Veronica Case RN     03/31/2024 1829 EDT nystatin (MYCOSTATIN) powder -- Topical Given Kaylah Hernandez RN     04/01/2024 0825 EDT chlorhexidine (PERIDEX) 0.12 % oral rinse 15 mL 15 mL Mouth/Throat Given Veronica Case RN     03/31/2024 2017 EDT chlorhexidine (PERIDEX) 0.12 % oral rinse 15 mL 15 mL Mouth/Throat Given Elana Tapia RN     04/01/2024 1336 EDT levalbuterol (XOPENEX) inhalation solution 1.25 mg 1.25 mg Nebulization Given Tania Esqueda,  RT     04/01/2024 0719 EDT levalbuterol (XOPENEX) inhalation solution 1.25 mg 1.25 mg Nebulization Given Chris Agustin, RT     03/31/2024 1920 EDT levalbuterol (XOPENEX) inhalation solution 1.25 mg 1.25 mg Nebulization Given Marcelina Garcia, RT     03/31/2024 1445 EDT levalbuterol (XOPENEX) inhalation solution 1.25 mg 1.25 mg Nebulization Given Chris Agustin, RT     04/01/2024 0834 EDT polyethylene glycol (MIRALAX) packet 17 g 17 g Per NG Tube Given Veronica Case, RN     04/01/2024 0841 EDT topiramate (TOPAMAX) 6 mg/ml oral suspension 50 mg 50 mg Per PEG Tube Given Veronica Case, MARSHA     03/31/2024 1829 EDT topiramate (TOPAMAX) 6 mg/ml oral suspension 50 mg 50 mg Per PEG Tube Given Kaylah Hernandez, MARSHA     04/01/2024 1336 EDT sodium chloride 3 % inhalation solution 4 mL 4 mL Nebulization Given Tania Esqueda, RT     04/01/2024 0719 EDT sodium chloride 3 % inhalation solution 4 mL 4 mL Nebulization Given Chris Agustin, RT     03/31/2024 1920 EDT sodium chloride 3 % inhalation solution 4 mL 4 mL Nebulization Given Marcelina Garcia, RT     03/31/2024 1445 EDT sodium chloride 3 % inhalation solution 4 mL 4 mL Nebulization Given Chris Agustin, RT     04/01/2024 1203 EDT venlafaxine (EFFEXOR) tablet 12.5 mg 12.5 mg Oral Given Hallie Bailey, RN     04/01/2024 0832 EDT venlafaxine (EFFEXOR) tablet 12.5 mg 12.5 mg Oral Given Veronica Case RN     03/31/2024 1829 EDT venlafaxine (EFFEXOR) tablet 12.5 mg 12.5 mg Oral Given Kaylah Hernandez, RN     04/01/2024 0825 EDT pantoprazole (PROTONIX) injection 40 mg 40 mg Intravenous Given Veronica Case RN     03/31/2024 2022 EDT pantoprazole (PROTONIX) injection 40 mg 40 mg Intravenous Given Elana Tapia RN     04/01/2024 0906 EDT fentaNYL injection 25 mcg 25 mcg Intravenous Given Veronica Case RN     04/01/2024 0835 EDT sodium chloride (OCEAN) 0.65 % nasal spray 2 spray 2 spray Each Nare Given Veronica Case RN     03/31/2024 2017 EDT sodium chloride (OCEAN) 0.65 % nasal spray 2 spray 2  spray Each Nare Given Elana Tapia RN     04/01/2024 0847 EDT lacosamide (VIMPAT) oral solution 100 mg 100 mg Oral Given Veronica Case RN     03/31/2024 2022 EDT lacosamide (VIMPAT) oral solution 100 mg 100 mg Oral Given Elana Tapia RN     04/01/2024 1213 EDT insulin regular (HumuLIN R,NovoLIN R) 1 Units/mL in sodium chloride 0.9 % 100 mL infusion 1 Units/hr Intravenous Rate/Dose Change Hallie Bailey RN     04/01/2024 0615 EDT insulin regular (HumuLIN R,NovoLIN R) 1 Units/mL in sodium chloride 0.9 % 100 mL infusion 6 Units/hr Intravenous Rate/Dose Change Elana Tapia RN     04/01/2024 0420 EDT insulin regular (HumuLIN R,NovoLIN R) 1 Units/mL in sodium chloride 0.9 % 100 mL infusion 3 Units/hr Intravenous New Bag Elana Tapia RN     04/01/2024 0416 EDT insulin regular (HumuLIN R,NovoLIN R) 1 Units/mL in sodium chloride 0.9 % 100 mL infusion 3 Units/hr Intravenous Rate/Dose Change Elana Tapia RN     04/01/2024 0208 EDT insulin regular (HumuLIN R,NovoLIN R) 1 Units/mL in sodium chloride 0.9 % 100 mL infusion 3 Units/hr Intravenous Rate/Dose Change Elana Tapia RN     04/01/2024 0027 EDT insulin regular (HumuLIN R,NovoLIN R) 1 Units/mL in sodium chloride 0.9 % 100 mL infusion 3 Units/hr Intravenous Rate/Dose Change Elana Tapia RN     03/31/2024 2214 EDT insulin regular (HumuLIN R,NovoLIN R) 1 Units/mL in sodium chloride 0.9 % 100 mL infusion 9 Units/hr Intravenous Rate/Dose Change Elana Tapia RN     03/31/2024 2016 EDT insulin regular (HumuLIN R,NovoLIN R) 1 Units/mL in sodium chloride 0.9 % 100 mL infusion 9 Units/hr Intravenous Rate/Dose Change Elana Tapia RN     03/31/2024 1824 EDT insulin regular (HumuLIN R,NovoLIN R) 1 Units/mL in sodium chloride 0.9 % 100 mL infusion 6 Units/hr Intravenous Rate/Dose Change Kaylah Hernandez RN     03/31/2024 1614 EDT insulin regular (HumuLIN R,NovoLIN R) 1 Units/mL in sodium chloride 0.9 % 100 mL  infusion 4 Units/hr Intravenous Rate/Dose Change Kaylah Hernandez RN     03/31/2024 1359 EDT insulin regular (HumuLIN R,NovoLIN R) 1 Units/mL in sodium chloride 0.9 % 100 mL infusion 2 Units/hr Intravenous Rate/Dose Change Kaylah Hernandez RN     04/01/2024 0829 EDT methylPREDNISolone sodium succinate (Solu-MEDROL) injection 40 mg 40 mg Intravenous Given Veronica Case RN     04/01/2024 1026 EDT sulfamethoxazole-trimethoprim (BACTRIM DS) 800-160 mg per tablet 1 tablet 1 tablet Oral Given Harish Garner RN     04/01/2024 0831 EDT modafinil (PROVIGIL) tablet 200 mg 200 mg Per NG Tube Given Veronica Case RN     03/31/2024 1632 EDT furosemide (LASIX) injection 80 mg 80 mg Intravenous Given Kaylah Hernandez RN     04/01/2024 0615 EDT ceFEPime (MAXIPIME) 2,000 mg in dextrose 5 % 50 mL IVPB 0 mg Intravenous Stopped Elana Tapia RN     04/01/2024 0448 EDT ceFEPime (MAXIPIME) 2,000 mg in dextrose 5 % 50 mL IVPB 2,000 mg Intravenous New Bag Elana Tapia RN     03/31/2024 2200 EDT ceFEPime (MAXIPIME) 2,000 mg in dextrose 5 % 50 mL IVPB 0 mg Intravenous Stopped Elana Tapia RN     03/31/2024 2052 EDT ceFEPime (MAXIPIME) 2,000 mg in dextrose 5 % 50 mL IVPB 2,000 mg Intravenous New Bag Elana Tapia RN     03/31/2024 1823 EDT ceFEPime (MAXIPIME) 2,000 mg in dextrose 5 % 50 mL IVPB 0 mg Intravenous Stopped Kaylah Hernandez RN     03/31/2024 1506 EDT ceFEPime (MAXIPIME) 2,000 mg in dextrose 5 % 50 mL IVPB 2,000 mg Intravenous New Bag Kaylah Hernandez RN     04/01/2024 0838 EDT furosemide (LASIX) injection 80 mg 80 mg Intravenous Not Given Veronica Case RN     04/01/2024 1029 EDT vancomycin (VANCOCIN) 1,750 mg in sodium chloride 0.9 % 500 mL IVPB 1,750 mg Intravenous Kevan Garner RN     04/01/2024 1322 EDT multi-electrolyte (ISOLYTE-S PH 7.4) bolus 500 mL 500 mL Intravenous Kevan Case RN     04/01/2024 1308 EDT iohexol (OMNIPAQUE) 350 MG/ML injection (MULTI-DOSE) 100 mL 100 mL  "Intravenous Given Rubia Adams            /54 (BP Location: Right arm)   Pulse (!) 135   Temp 99.5 °F (37.5 °C) (Oral)   Resp (!) 33   Ht 5' 8\" (1.727 m)   Wt 70.4 kg (155 lb 3.3 oz)   SpO2 95%   BMI 23.60 kg/m²       Physical Exam  Patient is sedated, trach to vent, not communicating  Ill-appearing, pale  Regular rhythm but tachycardic ~ 120's   No petechial rash   Ostomy in place ; abdomen soft   Unable to assess strength and neuro exam limited due to sedated       Recent Results (from the past 48 hour(s))   Fingerstick Glucose (POCT)    Collection Time: 03/30/24  1:55 PM   Result Value Ref Range    POC Glucose 127 65 - 140 mg/dl   Fingerstick Glucose (POCT)    Collection Time: 03/30/24  4:15 PM   Result Value Ref Range    POC Glucose 148 (H) 65 - 140 mg/dl   Fingerstick Glucose (POCT)    Collection Time: 03/30/24  5:58 PM   Result Value Ref Range    POC Glucose 113 65 - 140 mg/dl   Fingerstick Glucose (POCT)    Collection Time: 03/30/24  8:05 PM   Result Value Ref Range    POC Glucose 116 65 - 140 mg/dl   Fingerstick Glucose (POCT)    Collection Time: 03/30/24 10:12 PM   Result Value Ref Range    POC Glucose 138 65 - 140 mg/dl   Fingerstick Glucose (POCT)    Collection Time: 03/31/24 12:04 AM   Result Value Ref Range    POC Glucose 149 (H) 65 - 140 mg/dl   TEG 6 Global Hemostasis with Lysis    Collection Time: 03/31/24 12:44 AM   Result Value Ref Range    CKR(REACTION TIME) 6.7 4.6 - 9.1 Min    CKLY30 0.0 0.0 - 2.6 %    CRTMA(RAPIDTEG MAX AMPLITUDE) 71.1 (H) 52 - 70 mm    CFFMA (FUNCTIONAL FIBRINOGEN MAX AMPLITUDE) >52 (H) 15 - 32 mm   CBC and differential    Collection Time: 03/31/24 12:44 AM   Result Value Ref Range    WBC 2.18 (L) 4.31 - 10.16 Thousand/uL    RBC 2.13 (L) 3.81 - 5.12 Million/uL    Hemoglobin 6.5 (L) 11.5 - 15.4 g/dL    Hematocrit 20.6 (L) 34.8 - 46.1 %    MCV 97 82 - 98 fL    MCH 30.5 26.8 - 34.3 pg    MCHC 31.6 31.4 - 37.4 g/dL    RDW 17.1 (H) 11.6 - 15.1 %    MPV 11.5 8.9 - " 12.7 fL    Platelets 50 (L) 149 - 390 Thousands/uL   Manual Differential(PHLEBS Do Not Order)    Collection Time: 03/31/24 12:44 AM   Result Value Ref Range    Segmented % 53 43 - 75 %    Bands % 28 (H) 0 - 8 %    Lymphocytes % 2 (L) 14 - 44 %    Monocytes % 5 4 - 12 %    Eosinophils % 0 0 - 6 %    Basophils % 0 0 - 1 %    Metamyelocytes % 2 (H) 0 - 1 %    Myelocytes % 5 (H) 0 - 1 %    Atypical Lymphocytes % 5 (H) <=0 %    Absolute Neutrophils 1.77 (L) 1.85 - 7.62 Thousand/uL    Absolute Lymphocytes 0.15 (L) 0.60 - 4.47 Thousand/uL    Absolute Monocytes 0.11 0.00 - 1.22 Thousand/uL    Absolute Eosinophils 0.00 0.00 - 0.40 Thousand/uL    Absolute Basophils 0.00 0.00 - 0.10 Thousand/uL    Total Counted      nRBC 20 (H) 0 - 2 /100 WBC    Dohle Bodies Present     Toxic Vacuolated Neutrophils Present     RBC Morphology Present     Platelet Estimate Decreased (A) Adequate    Giant PLTs Present     Anisocytosis Present     Ovalocytes Present     Poikilocytes Present     Tear Drop Cells Present    Fingerstick Glucose (POCT)    Collection Time: 03/31/24  2:09 AM   Result Value Ref Range    POC Glucose 146 (H) 65 - 140 mg/dl   Fingerstick Glucose (POCT)    Collection Time: 03/31/24  4:10 AM   Result Value Ref Range    POC Glucose 142 (H) 65 - 140 mg/dl   Magnesium    Collection Time: 03/31/24  5:45 AM   Result Value Ref Range    Magnesium 2.6 1.9 - 2.7 mg/dL   Phosphorus    Collection Time: 03/31/24  5:45 AM   Result Value Ref Range    Phosphorus 5.5 (H) 2.7 - 4.5 mg/dL   CBC and differential    Collection Time: 03/31/24  5:45 AM   Result Value Ref Range    WBC 2.47 (L) 4.31 - 10.16 Thousand/uL    RBC 2.01 (L) 3.81 - 5.12 Million/uL    Hemoglobin 6.1 (L) 11.5 - 15.4 g/dL    Hematocrit 19.5 (L) 34.8 - 46.1 %    MCV 97 82 - 98 fL    MCH 30.3 26.8 - 34.3 pg    MCHC 31.3 (L) 31.4 - 37.4 g/dL    RDW 17.2 (H) 11.6 - 15.1 %    MPV 12.7 8.9 - 12.7 fL    Platelets 51 (L) 149 - 390 Thousands/uL   Basic metabolic panel    Collection  Time: 03/31/24  5:45 AM   Result Value Ref Range    Sodium 141 135 - 147 mmol/L    Potassium 3.7 3.5 - 5.3 mmol/L    Chloride 108 96 - 108 mmol/L    CO2 21 21 - 32 mmol/L    ANION GAP 12 4 - 13 mmol/L    BUN 78 (H) 5 - 25 mg/dL    Creatinine 0.96 0.60 - 1.30 mg/dL    Glucose 105 65 - 140 mg/dL    Calcium 10.2 8.4 - 10.2 mg/dL    eGFR 65 ml/min/1.73sq m   C-reactive protein    Collection Time: 03/31/24  5:45 AM   Result Value Ref Range    .2 (H) <3.0 mg/L   Path Slide Review    Collection Time: 03/31/24  5:45 AM   Result Value Ref Range    Case Report       Clinical Pathology Report                         Case: [id number]                                  Authorizing Provider:  Joe Lazcano DO             Collected:           03/31/2024 0545              Ordering Location:      [facility]     Received:            03/31/2024 [address] [place] [address]                                                                Pathologist:           Evan Escobar MD                                                                           Specimen:    Arm, Right                                                                                 Path Slide       PERIPHERAL BLOOD SMEAR: LEFT-SHIFTED PANCYTOPENIA AND CIRCULATING NUCLEATED RED BLOOD CELLS; SEE IMPRESSION    IMPRESSION:  The peripheral blood smear shows left-shifted pancytopenia with reactive changes. There is no circulating blast population, significant dysplasia, rouleaux formation, infectious organisms, evidence of hemolysis or platelet clumping or satellitism. Recent flow cytometry on peripheral blood (03/19/2024) showed granulocytosis with decreased CD10 expression and lymphocytopenia.    The findings are favored to be reactive in the setting of sepsis, lymphoma, and other complex underlying medical conditions. If cytopenias persist  following resolution of symptoms, additional workup would be warranted at that time, as clinically indicated. If there is concern for myelophthisic anemia secondary to bone marrow infiltration by lymphoma or other processes, a bone marrow biopsy could be considered. Bone marrow biopsies are not advised in active sepsis, however, as left-shift and overlapping morphologic abnormalities occur secondary to sepsis, as well as in myeloid neoplasms, and can be difficult to differentiate. If there is concern for hemophagocytic lymphohistiocytosis (HLH), bone marrow biopsy should be performed during acute symptoms. Correlation with imaging studies as well as with clinical and other laboratory findings is recommended.     RADHA Escobar MD  Interpretation performed at [facility], [address] [place] [address].      Procalcitonin    Collection Time: 03/31/24  5:45 AM   Result Value Ref Range    Procalcitonin 6.28 (H) <=0.25 ng/ml   Prepare Leukoreduced Platelet Pheresis (1 pheresis product = 6-8 pooled units): 1 Product, Irradiated, Leukoreduced    Collection Time: 03/31/24  5:55 AM   Result Value Ref Range    Unit Product Code T6582C49     Unit Number [id number]     Unit ABO A     Unit RH POS     Unit Dispense Status Presumed Trans     Unit Product Volume 300 mL   Fingerstick Glucose (POCT)    Collection Time: 03/31/24  6:13 AM   Result Value Ref Range    POC Glucose 134 65 - 140 mg/dl   Fingerstick Glucose (POCT)    Collection Time: 03/31/24  8:24 AM   Result Value Ref Range    POC Glucose 135 65 - 140 mg/dl   Blood gas, venous    Collection Time: 03/31/24  8:54 AM   Result Value Ref Range    pH, Norris 7.238 (L) 7.300 - 7.400    pCO2, Norris 52.5 (H) 42.0 - 50.0 mm Hg    pO2, Norris 67.7 (H) 35.0 - 45.0 mm Hg    HCO3, Norris 21.9 (L) 24 - 30 mmol/L    Base Excess, Norris -5.2 mmol/L    O2 Content, Norris 8.6 ml/dL    O2 HGB, VENOUS 89.9 (H) 60.0 - 80.0 %    Nasal Cannula 10     Other FIO2 60 %   Fingerstick Glucose (POCT)     Collection Time: 03/31/24 10:02 AM   Result Value Ref Range    POC Glucose 160 (H) 65 - 140 mg/dl   Sputum culture and Gram stain    Collection Time: 03/31/24 10:46 AM    Specimen: Tracheal Aspirate; Sputum   Result Value Ref Range    Sputum Culture Culture too young- will reincubate     Gram Stain Result 4+ Gram negative rods (A)     Gram Stain Result No polys seen (A)    Fingerstick Glucose (POCT)    Collection Time: 03/31/24 12:11 PM   Result Value Ref Range    POC Glucose 145 (H) 65 - 140 mg/dl   Blood gas, venous    Collection Time: 03/31/24 12:35 PM   Result Value Ref Range    pH, Norris 7.305 7.300 - 7.400    pCO2, Norris 40.0 (L) 42.0 - 50.0 mm Hg    pO2, Norris 75.4 (H) 35.0 - 45.0 mm Hg    HCO3, Norris 19.5 (L) 24 - 30 mmol/L    Base Excess, Norris -6.4 mmol/L    O2 Content, Norris 11.1 ml/dL    O2 HGB, VENOUS 93.8 (H) 60.0 - 80.0 %    Vent Type- AC AC     AC Rate 15     Tidal Volume 450 ml    Inspired Air (FIO2) 40     PEEP 6    Hemoglobin and hematocrit, blood    Collection Time: 03/31/24 12:35 PM   Result Value Ref Range    Hemoglobin 7.7 (L) 11.5 - 15.4 g/dL    Hematocrit 24.1 (L) 34.8 - 46.1 %   Fingerstick Glucose (POCT)    Collection Time: 03/31/24  1:52 PM   Result Value Ref Range    POC Glucose 110 65 - 140 mg/dl   Blood culture    Collection Time: 03/31/24  2:37 PM    Specimen: Arm, Left; Blood   Result Value Ref Range    Blood Culture Received in Microbiology Lab. Culture in Progress.    Blood culture    Collection Time: 03/31/24  2:50 PM    Specimen: Arm, Left; Blood   Result Value Ref Range    Blood Culture Received in Microbiology Lab. Culture in Progress.    Fingerstick Glucose (POCT)    Collection Time: 03/31/24  4:11 PM   Result Value Ref Range    POC Glucose 157 (H) 65 - 140 mg/dl   Fingerstick Glucose (POCT)    Collection Time: 03/31/24  6:09 PM   Result Value Ref Range    POC Glucose 195 (H) 65 - 140 mg/dl   Fingerstick Glucose (POCT)    Collection Time: 03/31/24  7:53 PM   Result Value Ref Range     POC Glucose 253 (H) 65 - 140 mg/dl   Fingerstick Glucose (POCT)    Collection Time: 03/31/24 10:13 PM   Result Value Ref Range    POC Glucose 203 (H) 65 - 140 mg/dl   Fingerstick Glucose (POCT)    Collection Time: 04/01/24 12:27 AM   Result Value Ref Range    POC Glucose 117 65 - 140 mg/dl   Fingerstick Glucose (POCT)    Collection Time: 04/01/24  2:08 AM   Result Value Ref Range    POC Glucose 121 65 - 140 mg/dl   Fingerstick Glucose (POCT)    Collection Time: 04/01/24  4:16 AM   Result Value Ref Range    POC Glucose 133 65 - 140 mg/dl   Basic metabolic panel    Collection Time: 04/01/24  4:30 AM   Result Value Ref Range    Sodium 143 135 - 147 mmol/L    Potassium 3.6 3.5 - 5.3 mmol/L    Chloride 109 (H) 96 - 108 mmol/L    CO2 21 21 - 32 mmol/L    ANION GAP 13 4 - 13 mmol/L     (H) 5 - 25 mg/dL    Creatinine 1.24 0.60 - 1.30 mg/dL    Glucose 131 65 - 140 mg/dL    Calcium 10.6 (H) 8.4 - 10.2 mg/dL    eGFR 47 ml/min/1.73sq m   Magnesium    Collection Time: 04/01/24  4:30 AM   Result Value Ref Range    Magnesium 2.5 1.9 - 2.7 mg/dL   Phosphorus    Collection Time: 04/01/24  4:30 AM   Result Value Ref Range    Phosphorus 5.5 (H) 2.7 - 4.5 mg/dL   CBC and differential    Collection Time: 04/01/24  4:30 AM   Result Value Ref Range    WBC 5.29 4.31 - 10.16 Thousand/uL    RBC 2.39 (L) 3.81 - 5.12 Million/uL    Hemoglobin 7.3 (L) 11.5 - 15.4 g/dL    Hematocrit 22.6 (L) 34.8 - 46.1 %    MCV 95 82 - 98 fL    MCH 30.5 26.8 - 34.3 pg    MCHC 32.3 31.4 - 37.4 g/dL    RDW 17.8 (H) 11.6 - 15.1 %    Platelets 36 (L) 149 - 390 Thousands/uL   Blood gas, venous    Collection Time: 04/01/24  4:30 AM   Result Value Ref Range    pH, Norris 7.249 (L) 7.300 - 7.400    pCO2, Norris 46.6 42.0 - 50.0 mm Hg    pO2, Norris 47.6 (H) 35.0 - 45.0 mm Hg    HCO3, Norris 19.9 (L) 24 - 30 mmol/L    Base Excess, Norris -6.9 mmol/L    O2 Content, Norris 9.7 ml/dL    O2 HGB, VENOUS 78.6 60.0 - 80.0 %    Vent Type- AC AC     AC Rate 16     Tidal Volume 450 ml     Inspired Air (FIO2) 40     PEEP 6    Fingerstick Glucose (POCT)    Collection Time: 04/01/24  5:52 AM   Result Value Ref Range    POC Glucose 153 (H) 65 - 140 mg/dl   Prepare Leukoreduced RBC: 1 Units, Irradiated, Leukoreduced    Collection Time: 04/01/24  5:55 AM   Result Value Ref Range    Unit Product Code G3486D17     Unit Number [id number]     Unit ABO A     Unit RH NEG     Crossmatch Compatible     Unit Dispense Status Presumed Trans     Unit Product Volume 350 mL   Fingerstick Glucose (POCT)    Collection Time: 04/01/24  8:16 AM   Result Value Ref Range    POC Glucose 145 (H) 65 - 140 mg/dl   Prepare Leukoreduced Platelet Pheresis (1 pheresis product = 6-8 pooled units): 1 Product, Leukoreduced, Irradiated    Collection Time: 04/01/24  9:09 AM   Result Value Ref Range    Unit Product Code L9378X40     Unit Number [id number]     Unit ABO O     Unit RH POS     Unit Dispense Status Issued     Unit Product Volume 300 mL   Fingerstick Glucose (POCT)    Collection Time: 04/01/24 10:11 AM   Result Value Ref Range    POC Glucose 147 (H) 65 - 140 mg/dl   Fingerstick Glucose (POCT)    Collection Time: 04/01/24 12:12 PM   Result Value Ref Range    POC Glucose 109 65 - 140 mg/dl       XR chest portable    Result Date: 4/1/2024  Narrative: XR CHEST PORTABLE INDICATION: AMS, possible aspiration. COMPARISON: Chest radiograph and CT of the chest 3/31/2024. FINDINGS: Tracheostomy in unchanged position. NG tube tip below the level of the left hemidiaphragm, extending inferior outside of the field-of-view. When compared to chest radiograph 3/31/2024, there is improvement in the previously described groundglass opacities in the left lung. The previously mentioned focal consolidation in the right midlung zone has not significantly changed. No pneumothorax or  pleural effusion. Normal cardiomediastinal silhouette. Bones are unremarkable for age. Normal upper abdomen.     Impression: Focal consolidation in the right  middle lung zone is approximately the same when compared to 3/31/2024, concerning for pneumonia. Improvement in background groundglass consolidations in the left lung when compared to 3/31/2024. Resident: LOUISE LEUNG I, the attending radiologist, have reviewed the images and agree with the final report above. Workstation performed: [id number]     XR chest portable ICU    Result Date: 3/31/2024  Narrative: XR CHEST PORTABLE ICU INDICATION: tachypnea. COMPARISON: CXR 3/29/2024 and 3/22/2024, chest CT 3/31/2024. FINDINGS: Tracheostomy projecting over the trachea. NG tube below the diaphragm. Persistent groundglass opacity with new consolidation in the right midlung. No pneumothorax or pleural effusion. Normal cardiomediastinal silhouette. Bones are unremarkable for age. Normal upper abdomen.     Impression: Persistent groundglass opacity with new consolidation in the right midlung. See chest CT for further evaluation. Workstation performed: [id number]     CT chest w contrast    Result Date: 3/31/2024  Narrative: CT CHEST WITH IV CONTRAST INDICATION: bilateral opacities, tachypnea. COMPARISON: Compared with 3/10/2024 TECHNIQUE: CT examination of the chest was performed. Multiplanar 2D reformatted images were created from the source data. This examination, like all CT scans performed in the [facility], was performed utilizing techniques to minimize radiation dose exposure, including the use of iterative reconstruction and automated exposure control. Radiation dose length product (DLP) for this visit: 436.63 mGy-cm IV Contrast: 85 mL of iohexol (OMNIPAQUE) FINDINGS: LUNGS: Bilateral parenchymal groundglass haziness with focal areas of consolidation like change new in the right upper lobe peripherally and in the lower lobes bilaterally posteriorly. Breathing motion artifact limits evaluation of bronchiectatic changes  in the lower lobes bilaterally.. There is no tracheal or endobronchial lesion.  PLEURA: Unremarkable. HEART/GREAT VESSELS: Heart is unremarkable for patient's age. No thoracic aortic aneurysm. MEDIASTINUM AND KIRBY: Unremarkable. CHEST WALL AND LOWER NECK: Enlarged heterogeneous right thyroid lobe. Left lobe not seen. Tracheostomy tube in place. VISUALIZED STRUCTURES IN THE UPPER ABDOMEN: Nasogastric tube in the stomach with fluid. OSSEOUS STRUCTURES: No acute fracture or destructive osseous lesion.     Impression: Bilateral parenchymal infiltrative changes with consolidation like focus in the right upper lobe is new. Relative worsening. Workstation performed: [id number]     CT head wo contrast    Result Date: 3/31/2024  Narrative: CT BRAIN - WITHOUT CONTRAST INDICATION:   AMS. COMPARISON: 3/13/2024 TECHNIQUE:  CT examination of the brain was performed.  Multiplanar 2D reformatted images were created from the source data. Radiation dose length product (DLP) for this visit:  856.42 mGy-cm .  This examination, like all CT scans performed in the [facility], was performed utilizing techniques to minimize radiation dose exposure, including the use of iterative  reconstruction and automated exposure control. IMAGE QUALITY:  Diagnostic. FINDINGS: PARENCHYMA: Cystic encephalomalacia and gliosis in the left middle cranial fossa in the left temporal pole subjacent to a pterional craniotomy redemonstrated indicative of left temporal lobectomy. Stable basal ganglia calcifications are bilaterally symmetric. Mild periventricular hypodensities noted. There are also bilateral cerebellar calcifications in the region of the dentate nuclei. VENTRICLES AND EXTRA-AXIAL SPACES: Stable. No hydrocephalus. VISUALIZED ORBITS: Conjugate gaze to the right. PARANASAL SINUSES: Mucosal thickening CALVARIUM AND EXTRACRANIAL SOFT TISSUES: Left pterional craniotomy again noted     Impression: 1. No acute intracranial hemorrhage, mass effect or edema. 2. Stable posttreatment related changes status post left  temporal lobectomy. 3. 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 Differential diagnosis could include treatment related changes versus other etiologies.. Workstation performed: [id number]     XR chest portable ICU    Result Date: 3/30/2024  Narrative: XR CHEST PORTABLE ICU INDICATION: tachypnea. COMPARISON: CXR 3/22/2024, chest CT 3/10/2024. FINDINGS: Tracheostomy. NG tube below the diaphragm. Worsening bilateral groundglass opacity. No pneumothorax or pleural effusion. Normal cardiomediastinal silhouette. Bones are unremarkable for age. Persistent mild elevation of the right diaphragm.     Impression: Worsening bilateral groundglass opacity. Workstation performed: [id number]     FL barium swallow video w speech    Result Date: 3/27/2024  Narrative: A video barium swallow study was performed by the Department of Speech Pathology. Please refer to the report for the official interpretation. The images are stored for archival purposes only. Study images were not formally reviewed by the Radiology Department.    VAS upper limb venous duplex scan, unilateral/limited    Result Date: 3/24/2024  Narrative:  [facility] REPORT CLINICAL: Indications: Patient presents with left upper extremity edema x few days. Risk Factors The patient has history of CKD.  FINDINGS:  Left                       Thrombus           Cephalic Upper Arm Distal  Acute - Occlusive  Ceph AC                    Acute - Occlusive     CONCLUSION:  Impression RIGHT UPPER LIMB LIMITED: Unable to evaluate the neck veins due to medical devises.  LEFT UPPER LIMB: No evidence of acute or chronic deep vein thrombosis. Unable to evaluate IJV and innominate vein due medical devises. There is evidence of acute occlusive superficial thrombophlebitis noted in the cephalic vein at the elbow and distal upper arm. Doppler evaluation shows a normal response to augmentation maneuvers.  Technical  findings were given to Dr. Sindi Recinos.  SIGNATURE: Electronically Signed by: ABIMAEL GONZALES DO on 2024-03-24 09:56:59 PM    XR chest portable    Result Date: 3/22/2024  Narrative: XR CHEST PORTABLE INDICATION: hypoxia. COMPARISON: Compared with 3/20/2024 FINDINGS: Tracheostomy tube. Feeding tube in the stomach. Mild prominent interstitial markings. No pneumothorax or pleural effusion. Normal cardiomediastinal silhouette. Bones are unremarkable for age. Normal upper abdomen.     Impression: Mild congestive changes. Workstation performed: [id number]     XR chest portable ICU    Result Date: 3/20/2024  Narrative: XR CHEST PORTABLE ICU INDICATION: f/u pneumonia. COMPARISON: Radiograph March 14, 2024 FINDINGS: Tracheostomy tube in place. Enteric tube coursing into the stomach. Multifocal patchy airspace opacities in a bronchiolar distribution, particularly in the lower lobes, overall slightly improved. No pneumothorax or pleural effusion. Normal cardiomediastinal silhouette. Bones are unremarkable for age. Normal upper abdomen.     Impression: Overall mild improvement in multifocal bronchopneumonia, likely aspiration related. Workstation performed: [id number]     Bronchoscopy    Result Date: 3/19/2024  Narrative: Table formatting from the original result was not included. [facility] [address] [facility] [address] [phone] DATE OF SERVICE: 3/11/24 PHYSICIAN(S): Attending: No Staff Documented Fellow: No Staff Documented INDICATION: Acute respiratory failure with hypoxia (HCC) POST-OP DIAGNOSIS: See the impression below. PREPROCEDURE: Standard airway preparation completed per respiratory therapy protocol.  Informed consent was obtained. Images reviewed prior to the procedure.  A Time Out was performed. No suspicion or identified risk for TB or other airborne infectious disease; bronchoscopy procedure being performed for diagnostic purposes. PROCEDURE: Bronchoscopy DETAILS OF PROCEDURE:  Patient was taken to the procedure room where a time out was performed to confirm correct patient and correct procedure. The patient's blood pressure, heart rate, level of consciousness and oxygen were monitored throughout the procedure. The procedure was not difficult. The patient tolerated the procedure well. There were no apparent adverse events. ANESTHESIA INFORMATION: ASA: ASA status not filed in the log. Anesthesia Type: Anesthesia type not filed in the log. FINDINGS: Normal SPECIMENS: ID Type Source Tests Collected by Time Destination A :  Bronchial Lung, Left Lower Lobe Bronchial Washing FUNGAL CULTURE, VIRUS CULTURE, BRONCHIAL CULTURE AND GRAM STAIN, LEGIONELLA CULTURE, PNEUMOCYSTIS SMEAR BY DFA ([facility]), AFB CULTURE WITH STAIN, LEUKEMIA/LYMPHOMA FLOW CYTOMETRY, CD4/CD8 BRONCHIAL ONLY, NOVEL CORONAVIRUS (COVID-19), PCR [facility] Jarett Martins MD 3/11/2024  1:06 PM       Impression: Pneumonia RECOMMENDATION:  Follow up bronchoscopy     Bronchoscopy    Result Date: 3/17/2024  Narrative: Table formatting from the original result was not included. [facility] End[facility] [address] [facility] [address] [phone] DATE OF SERVICE: 3/17/24 PHYSICIAN(S): Attending: Jarett Martins MD Fellow: Miguel Interiano MD INDICATION: Pneumonia of both lungs due to infectious organism, unspecified part of lung POST-OP DIAGNOSIS: See the impression below. PREPROCEDURE: Standard airway preparation completed per respiratory therapy protocol.  Informed consent was obtained. Images reviewed prior to the procedure.  A Time Out was performed. No suspicion or identified risk for TB or other airborne infectious disease; bronchoscopy procedure being performed for diagnostic purposes. PROCEDURE: Bronchoscopy DETAILS OF PROCEDURE: Bronchoscope was brought to patient's bedside where a time out was performed to confirm correct patient and correct procedure. The patient was already under sedation prior to the procedure. The  patient's blood pressure, heart rate, oxygen and respirations were monitored throughout the procedure. The patient experienced no blood loss. The scope was introduced through the tracheostomy. The procedure was not difficult. The patient tolerated the procedure well. There were no apparent adverse events. ANESTHESIA INFORMATION: ASA: ASA status cannot be found on the log. Anesthesia Type: Anesthesia type cannot be found on the log. FINDINGS: The upper trachea, middle trachea, lower trachea and main tracee appeared normal. Moderate, generalized erythematous and friable mucosa in the left main stem, MANDY, LLL, right main stem, RUL, bronchus intermedius, RML and RLL Moderate, thick and purulent secretions present in the left main stem, MANDY, lingula, LLL, right main stem, RUL, bronchus intermedius, RML and RLL; secretions were easily removed by therapeutic aspiration and the airway was cleared SPECIMENS: No sample collected      Impression: Erythematous and friable airways Moderate amount of purulent secretions throughout RECOMMENDATION:  Continue with hypertonic saline nebs and as needed bronchoscopy for airway clearance  I supervised and was present for the entire procedure Jarett Martins MD [facility]     Echo follow up/limited w/ contrast if indicated    Result Date: 3/17/2024  Narrative:   Left Ventricle: The left ventricular ejection fraction is 70%. Systolic function is vigorous. Global longitudinal strain is reduced at -14%. Wall motion is normal.   Aortic Valve: There is mild regurgitation.   Aorta: The aortic root is mildly dilated. The ascending aorta is mildly dilated. The aortic root is 4.10 cm. The ascending aorta is 4.1 cm.     XR chest portable ICU    Result Date: 3/14/2024  Narrative: XR CHEST PORTABLE ICU INDICATION: resp status change. COMPARISON: 3/12/2024 FINDINGS: Unchanged lines and tubes. Worsening consolidation throughout the right lung and left lower lobe  consistent with multi lobar pneumonia, possibly aspiration. No pneumothorax or pleural effusion. Normal cardiomediastinal silhouette. Bones are unremarkable for age. Normal upper abdomen.     Impression: Worsening consolidation throughout the right lung and left lower lobe consistent with multi lobar pneumonia, possibly aspiration. The study was marked in EPIC for immediate notification. Workstation performed: [id number]     CT head wo contrast    Result Date: 3/13/2024  Narrative: CT BRAIN - WITHOUT CONTRAST INDICATION:   declining neuro exam. COMPARISON: CT dated 3/9/2024. TECHNIQUE:  CT examination of the brain was performed.  Multiplanar 2D reformatted images were created from the source data. Radiation dose length product (DLP) for this visit:  840.6 mGy-cm .  This examination, like all CT scans performed in the [facility], was performed utilizing techniques to minimize radiation dose exposure, including the use of iterative reconstruction and automated exposure control. IMAGE QUALITY:  Diagnostic. FINDINGS: PARENCHYMA: Stable postop changes status post left temporal lobectomy. No acute infarct, hemorrhage, mass or mass effect. Stable mild nonspecific white matter changes likely due to chronic microangiopathy. Stable dense calcification in the bilateral basal ganglia and dentate nuclei. VENTRICLES AND EXTRA-AXIAL SPACES:  Normal for the patient's age. VISUALIZED ORBITS: Normal visualized orbits. PARANASAL SINUSES: Left maxillary sinus mucosal thickening. Stable small fluid levels in the sphenoid sinuses. Stable bilateral mastoid effusions, left greater than right. Stable left middle ear effusion.. CALVARIUM AND EXTRACRANIAL SOFT TISSUES: Left temporal craniotomy. NG tube is noted.     Impression: No acute intracranial pathology. Stable postoperative changes status post left temporal lobectomy. Chronic microangiopathy. Stable bilateral mastoid effusions, left greater than right and left middle  ear effusion.. Stable sinus inflammatory changes. Workstation performed: [id number]     XR chest portable    Result Date: 3/13/2024  Narrative: XR CHEST PORTABLE INDICATION: post tracheostomy tube placement. COMPARISON: 3/12/2024 FINDINGS: Tracheostomy is in the typical location and seems satisfactory. Endogastric tube extends out of the field-of-view to the stomach. Left IJ central catheter tip projects at the caval atrial junction. Artifacts projecting over the chest including breathing apparatus and cardiac monitor leads. Patchy infiltrates throughout much of the right lung and infiltrate at the left base. No right-sided pneumothorax appreciated. Very difficult to assess the left apical region due to multiple artifacts. No large volume pneumothorax appreciated. No pleural  fluid seen. Unremarkable cardiomediastinal silhouette.  Atherosclerotic vascular calcifications noted. Bones are unremarkable for age. Normal upper abdomen.     Impression: Limited study. Tracheostomy appears satisfactory in position. Patchy pulmonary infiltrates throughout much of the right lung. Left basilar infiltrate. Otherwise, the left lung appears improved in aeration from prior exam. Recommend continued follow-up Workstation performed: [id number]     XR chest portable ICU    Result Date: 3/12/2024  Narrative: XR CHEST PORTABLE ICU INDICATION: post intubation. COMPARISON: 3/11/2024 at 0 823 FINDINGS: The endotracheal tube terminates 3 cm above the tracee. The left IJ approach catheter terminates in the cavoatrial junction. The endogastric tube terminates below the diaphragm beyond the imaged limits. Redemonstrated are diffuse patchy airspace opacities. No pneumothorax or pleural effusion. Normal cardiomediastinal silhouette. Bones are unremarkable for age. Normal upper abdomen.     Impression: Tubes and lines in satisfactory position. Grossly unchanged diffuse parenchymal disease. Workstation performed: [id number]     XR chest portable  ICU    Result Date: 3/12/2024  Narrative: XR CHEST PORTABLE ICU INDICATION: post intubation. COMPARISON: 3/11/2024 at 0 823 FINDINGS: The endotracheal tube terminates 3 cm above the tracee. The left IJ approach catheter terminates in the cavoatrial junction. The endogastric tube terminates below the diaphragm beyond the imaged limits. Redemonstrated are diffuse patchy airspace opacities. No pneumothorax or pleural effusion. Normal cardiomediastinal silhouette. Bones are unremarkable for age. Normal upper abdomen.     Impression: Tubes and lines in satisfactory position. Grossly unchanged diffuse parenchymal disease. Workstation performed: [id number]     Bronchoscopy    Result Date: 3/12/2024  Narrative: [facility] [address] [facility] [address] [phone] DATE OF SERVICE: 3/12/24 PHYSICIAN(S): Attending: Dr. Jarett Martins Fellow: Dr. Nigel Escobar, PGY IV INDICATION: Tracheostomy present (HCC), Pre- and Post- Tracheostomy placement POST-OP DIAGNOSIS: See the impression below. PREPROCEDURE: Standard airway preparation completed per respiratory therapy protocol.  Informed consent was obtained. Images reviewed prior to the procedure.  A Time Out was performed. No suspicion or identified risk for TB or other airborne infectious disease; bronchoscopy procedure being performed for diagnostic purposes. PROCEDURE: Bronchoscopy DETAILS OF PROCEDURE: Patient was taken to the procedure room where a time out was performed to confirm correct patient and correct procedure. The patient was already under sedation prior to the procedure. The patient's blood pressure, ECG, heart rate, level of consciousness, oxygen and respirations were monitored throughout the procedure. The patient experienced no blood loss. The scope was introduced through the endotracheal tube. The procedure was not difficult. The patient tolerated the procedure well. There were no apparent adverse events. ANESTHESIA INFORMATION:  ASA: ASA status cannot be found on the log. Anesthesia Type: Anesthesia type cannot be found on the log. FINDINGS: Moderate, thick, purulent and white secretions present in the left lower lobar bronchus, left superior segment (LB6), left anterior basal segment (LB7+8), left lateral basal segment (LB9), left posterior basal segment (LB10), bronchus intermedius, right lower lobar bronchus, right superior segment (RB6), right medial basal segment (RB7), right anterior basal segment (RB8), right lateral basal segment (RB9) and right posterior basal segment (RB10); secretions were easily removed and the airway was cleared The lower trachea, main trcaee, left main stem, left upper lobar bronchus, left apical-posterior segment (LB1+2), left upper anterior segment (LB3), lingular lobar bronchus, superior lingular segment (LB4), inferior lingular segment (LB5), right main stem, right upper lobar bronchus, right apical segment (RB1), right posterior segment (RB2), right anterior segment (RB3), right middle lobar bronchus, right lateral segment (RB4) and right medial segment (RB5) appeared normal. SPECIMENS: No specimens collected for this procedure      Impression: Excessive secretions again seen from lower lobes bilaterally, pre-tracheostomy. Post-Tracheostomy bronchoscopy showing minimal procedural bleeding, and without significant secretions. I was present and supervised the entire procedure Jarett Martins MD [facility]     US bedside procedure    Result Date: 3/12/2024  Narrative: [id number]    US bedside procedure    Result Date: 3/12/2024  Narrative: [id number]    Bronchoscopy    Result Date: 3/11/2024  Narrative: Table formatting from the original result was not included. [facility] End[facility] [address] [facility] [address] [phone] DATE OF SERVICE: 3/11/24 PHYSICIAN(S): Attending: Dr. Denson  Eliezer Fellow: Dr. Nigel Escobar, PGY IV INDICATION: Acute respiratory failure with hypoxia (HCC) POST-OP DIAGNOSIS: See the impression below. PREPROCEDURE: Standard airway preparation completed per respiratory therapy protocol.  Informed consent was obtained. Images reviewed prior to the procedure.  A Time Out was performed. No suspicion or identified risk for TB or other airborne infectious disease; bronchoscopy procedure being performed for diagnostic purposes. PROCEDURE: Bronchoscopy DETAILS OF PROCEDURE: Patient was taken to the procedure room where a time out was performed to confirm correct patient and correct procedure. The patient was already under sedation prior to the procedure. The patient's blood pressure, ECG, heart rate, oxygen, level of consciousness and respirations were monitored throughout the procedure. The patient experienced no blood loss. The scope was introduced through the endotracheal tube. The procedure was not difficult. The patient tolerated the procedure well. There were no apparent adverse events. ANESTHESIA INFORMATION: ASA: ASA status cannot be found on the log. Anesthesia Type: Anesthesia type cannot be found on the log. FINDINGS: Excessive, thick, purulent and white secretions present in the left lower lobar bronchus, left superior segment (LB6), left anterior basal segment (LB7+8), left lateral basal segment (LB9), left posterior basal segment (LB10), bronchus intermedius, right lower lobar bronchus, right superior segment (RB6), right medial basal segment (RB7), right anterior basal segment (RB8), right lateral basal segment (RB9) and right posterior basal segment (RB10); secretions were easily removed and the airway was cleared; performed washing, sent sample for microbiology analysis The left main stem, left upper lobar bronchus, left apical-posterior segment (LB1+2), left upper anterior segment (LB3), lingular lobar bronchus, superior lingular segment (LB4), inferior lingular  segment (LB5), right main stem, right upper lobar bronchus, right apical segment (RB1), right posterior segment (RB2) and right anterior segment (RB3) appeared normal. SPECIMENS: Collected for cultures, cell count, and cytology      Impression: Thick, purulent secretions mainly in the b/l lower lobe segments. RUL, RML, and MANDY were normal. RECOMMENDATION:  F/u gram stain and culture, viral culture, bacterial culture, PCP, fungal culture, COVID  I supervised and was present for the entire procedure Jarett Martins MD [facility]     XR chest portable    Result Date: 3/11/2024  Narrative: XR CHEST PORTABLE INDICATION: tachypnea. COMPARISON: CXR and chest CT 3/10/2024. FINDINGS: NG tube in stomach. Left ocular catheter at cavoatrial junction. Persistent extensive bilateral groundglass opacity. No pneumothorax or pleural effusion. Normal cardiomediastinal silhouette. Bones are unremarkable for age. Normal upper abdomen.     Impression: Persistent extensive bilateral groundglass opacity. Workstation performed: [id number]     XR abdomen 1 view kub    Result Date: 3/11/2024  Narrative: XR ABDOMEN 1 VIEW KUB INDICATION: abdominal distension. COMPARISON: None FINDINGS: Enteric catheter courses into the distal stomach. Colostomy in the right lower quadrant Nonobstructive bowel gas pattern. No evidence of pneumoperitoneum on this supine study. Upright or left lateral decubitus imaging is more sensitive to detect subtle free air in the appropriate setting. Bilateral renal calcifications better assessed on CT. Pulmonary opacities better assessed on dedicated chest imaging. Bones are unremarkable for the patient's age.     Impression: Nonobstructive bowel gas pattern. Workstation performed: [id number]     CT chest abdomen pelvis wo contrast    Result Date: 3/10/2024  Narrative: CT CHEST, ABDOMEN AND PELVIS WITHOUT IV CONTRAST INDICATION: concern for infection. COMPARISON: 3/6/2024.  TECHNIQUE: CT examination of the chest, abdomen and pelvis was performed without intravenous contrast. Multiplanar 2D reformatted images were created from the source data. This examination, like all CT scans performed in the [facility], was performed utilizing techniques to minimize radiation dose exposure, including the use of iterative reconstruction and automated exposure control. Radiation dose length product (DLP) for this visit: 549.64 mGy-cm Enteric Contrast: Not administered. FINDINGS: CHEST LUNGS: Extensive groundglass and nodular consolidation throughout both lung fields with more focal peripheral opacification particularly at the lung bases. Extensive regions of bronchiectasis most notable at the lung bases. PLEURA: Unremarkable. HEART/GREAT VESSELS: Heart is unremarkable for patient's age. No thoracic aortic aneurysm with top normal size of the ascending thoracic aorta measuring 39 mm at the level of the left main pulmonary artery. MEDIASTINUM AND KIRBY: Unremarkable. CHEST WALL AND LOWER NECK: Left central line terminates at the caval atrial junction. ABDOMEN LIVER/BILIARY TREE: Unremarkable. GALLBLADDER: Vicarious excretion. SPLEEN: Unremarkable. PANCREAS: Unremarkable. ADRENAL GLANDS: Unremarkable. 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 Mild right renal fullness as before without distal obstructive pathology. STOMACH AND BOWEL: Nasogastric tube terminates at the gastroduodenal junction. Right lower quadrant ileostomy with a patent stoma after ileocolectomy. Diverticular disease also noted without diverticulitis. Edema and small foci of subcutaneous emphysema after recent intervention. No drainable collection. APPENDIX: No findings to suggest appendicitis. ABDOMINOPELVIC CAVITY: No ascites. No pneumoperitoneum. No lymphadenopathy. VESSELS: Unremarkable for patient's age. PELVIS REPRODUCTIVE ORGANS:  Unremarkable for patient's age. URINARY BLADDER: Decompressed with a Ortiz catheter in place. ABDOMINAL WALL/INGUINAL REGIONS: Unremarkable. BONES: No acute fracture or suspicious osseous lesion.     Impression: 1. Persistent bilateral groundglass and nodular consolidation with peripheral opacification at the right greater than left lung bases. Findings remain concerning for multi lobar infiltrates with possible superimposed COVID-19 opacities. 2. Status post ileocolectomy with a right lower quadrant ileostomy again exhibiting a patent stoma. Persistent inflammatory change and subcutaneous emphysema after recent intervention. No drainable collection. 3. Bilateral renal calcifications again suggestive of medullary sponge kidney with persistent mild right renal fullness but no evidence of distal obstructive pathology. Workstation performed: [id number]     XR chest portable    Result Date: 3/10/2024  Narrative: XR CHEST PORTABLE INDICATION: sob. COMPARISON: CXR 3/9/2024 and 3/7/2024, chest CT 3/6/2024. FINDINGS: NG tube in stomach. Left upper catheter at cavoatrial junction. Persistent extensive bilateral groundglass opacity. No pneumothorax or pleural effusion. Normal cardiomediastinal silhouette. Bones are unremarkable for age. Normal upper abdomen.     Impression: Persistent extensive bilateral groundglass opacity. Workstation performed: [id number]     CT head wo contrast    Result Date: 3/9/2024  Narrative: CT BRAIN - WITHOUT CONTRAST INDICATION:   Stroke Alert. COMPARISON: CT head from 3/6/2024 and priors, MRI of the brain from 1/10/2024 TECHNIQUE:  CT examination of the brain was performed.  Multiplanar 2D reformatted images were created from the source data. Radiation dose length product (DLP) for this visit:  859.76 mGy-cm .  This examination, like all CT scans performed in the [facility], was performed utilizing techniques to minimize radiation dose exposure, including the use of iterative   reconstruction and automated exposure control. IMAGE QUALITY:  Diagnostic. FINDINGS: PARENCHYMA: Stable postoperative changes from left temporal lobectomy. Decreased attenuation is noted in periventricular and subcortical white matter demonstrating an appearance that is statistically most likely to represent mild microangiopathic change. Prominent calcifications within bilateral basal ganglia and dentate nuclei. No CT signs of acute vascular territory infarction.  No intracranial mass, mass effect or midline shift.  No acute parenchymal hemorrhage. VENTRICLES AND EXTRA-AXIAL SPACES:  Normal for the patient's age. VISUALIZED ORBITS: Normal visualized orbits. PARANASAL SINUSES: Mild polypoid mucosal thickening of the left maxillary sinus. Layering fluid within bilateral sphenoid sinuses CALVARIUM AND EXTRACRANIAL SOFT TISSUES: No acute osseous abnormalities. Sequela of left temporal craniotomy. Large left and moderate right mastoid effusions, unchanged. Left middle ear effusion.     Impression: -No acute intracranial abnormality. Stable microangiopathic changes. -Stable postoperative changes related to left temporal lobectomy. -Redemonstrated air-fluid levels within bilateral sphenoid sinuses, left greater than right mastoid effusions and left middle ear effusion. Clinical correlation advised. I personally communicated the findings via telephone with Ravi Garcia at 3:06 p.m. on 3/9/2024. Workstation performed: [id number]     XR chest portable    Result Date: 3/9/2024  Narrative: XR CHEST PORTABLE INDICATION: tachypnea, hypoxia. COMPARISON: Chest radiograph 3/7/2024; CT chest abdomen pelvis 3/6/2024 FINDINGS: Left-sided central line with tip at the cavoatrial junction. Enteric tube tip below the level of the diaphragm. No significant interval change in multifocal patchy groundglass opacities, left side greater than right. No pneumothorax or pleural effusion. Normal cardiomediastinal silhouette. Bones are  unremarkable for age. Normal upper abdomen.     Impression: No significant interval change in multifocal patchy groundglass opacities, left side greater than right. Findings are likely related to patient's COVID-19 pneumonia. Resident: SANDIP Nieto I, the attending radiologist, have reviewed the images and agree with the final report above. Workstation performed: [id number]     XR chest portable ICU    Result Date: 3/7/2024  Narrative: XR CHEST PORTABLE ICU INDICATION: sob. Patient has confirmed COVID-19. COMPARISON: Portable chest from March 5, 2024. CT of the chest, abdomen and pelvis from March 6, 2024. FINDINGS: NG tube extends into the stomach. Tip of left-sided IJ catheter in superior vena cava. Patchy opacification throughout the right lung, improved since 3/5/2024. Persistent opacification in portions of the left lower lobe. No pneumothorax or pleural effusion. Normal cardiomediastinal silhouette. Bones are unremarkable for age. Normal upper abdomen.     Impression: Diffuse patchy opacification in the right lung, either pneumonia or asymmetric pulmonary edema, improved in appearance since 3/5/2024. Persistent atelectasis and/or consolidation in the base of the left lower lobe. Workstation performed: [id number]     CT chest abdomen pelvis wo contrast    Result Date: 3/6/2024  Narrative: CT CHEST, ABDOMEN AND PELVIS WITHOUT IV CONTRAST INDICATION: worsening SOB, abd pain. COVID-positive. COMPARISON: CT chest abdomen pelvis 3/4/2024. CT abdomen pelvis 10/21/2020. CT chest abdomen pelvis 2/20/2024. TECHNIQUE: CT examination of the chest, abdomen and pelvis was performed without intravenous contrast. Multiplanar 2D reformatted images were created from the source data. This examination, like all CT scans performed in the [facility], was performed utilizing techniques to minimize radiation dose exposure, including the use of iterative reconstruction and automated exposure control. Radiation dose  length product (DLP) for this visit: 1086.03 mGy-cm Enteric Contrast: Not administered. FINDINGS: CHEST LUNGS: Evaluation partially limited by motion artifact. Increased extent of groundglass opacities and patchy consolidations throughout the lungs, particularly increased in the upper lobes compared to prior CT 3/4/2024. Lobular consolidation in the posterior lung bases is not significantly changed since most recent exam and is suspicious for atelectasis and bacterial pneumonia. Right middle lobe patchy opacity appears unchanged from most recent exam. PLEURA: Unremarkable. HEART/GREAT VESSELS: Mild atherosclerotic coronary artery calcifications. Unchanged fusiform ectasia of the ascending thoracic aorta measuring up to 40 mm (series 303 image 59). Recommendation is for follow-up low radiation dose chest CT in one year. Left IJ CVC terminating in the distal SVC. MEDIASTINUM AND KIRBY: Unremarkable. CHEST WALL AND LOWER NECK: Prior left hemithyroidectomy. ABDOMEN LIVER/BILIARY TREE: Unremarkable. GALLBLADDER: Redemonstrated layering high density in the dependent lumen of the gallbladder. Likely vicarious excretion of injected IV contrast versus layering sludge. No gallbladder wall thickening. No pericholecystic fluid. SPLEEN: Subcentimeter splenic hypodensity (303 image 121) seen dating back to December 2023, too small to characterize but statistically likely benign. PANCREAS: Unremarkable. ADRENAL GLANDS: Unremarkable. KIDNEYS/URETERS: Bilateral renal cysts. Multiple bilateral renal calculi/calcifications again noted, again possibly due to medullary sponge kidney. Mild right pelvocaliectasis and ureterectasis. There is also fullness of the left renal pelvis. No obstructing calculi. STOMACH AND BOWEL: Enteric tube with tip terminating at the distal stomach. Postsurgical changes of ileocolectomy with end ileostomy. No bowel obstruction. Colonic diverticulosis without findings of acute diverticulitis. APPENDIX:  Surgically absent. ABDOMINOPELVIC CAVITY: No ascites. No pneumoperitoneum. No lymphadenopathy. VESSELS: Atherosclerosis without abdominal aortic aneurysm. PELVIS REPRODUCTIVE ORGANS: Unremarkable for patient's age. URINARY BLADDER: Ortiz catheter within the bladder. Moderately increasing gas in the urinary bladder, likely secondary to Ortiz placement. ABDOMINAL WALL/INGUINAL REGIONS: Postsurgical changes of the anterior abdominal wall. Redemonstrated edema and subcutaneous gas within the anterior abdominal wall fat surrounding the right lower quadrant ostomy site, amount of gas slightly decreased from  prior. No discrete fluid collection. Soft tissue densities in the anterior abdominal wall may be related to subcutaneous injections. BONES: No acute fracture or suspicious osseous lesion.     Impression: 1. Increased extent of groundglass opacities and patchy consolidations throughout the lungs. Focal consolidations in the posterior lung bases and right middle lobe are not significantly changed compared to March 4, 2024. Again, findings are likely due to  a combination of COVID-19 and bacterial pneumonia. 2. Redemonstrated edema and subcutaneous gas within the anterior abdominal wall fat surrounding the right lower quadrant ostomy site, amount of gas slightly decreased from prior. 4. Mild right pelvocaliectasis and ureterectasis. No obstructing calculi. 5. Fusiform ectasia of the ascending thoracic aorta measuring 40 mm. Recommend follow-up low radiation dose chest CT in 1 year. The study was marked in EPIC for immediate notification. Resident: LOUISE LEUNG I, the attending radiologist, have reviewed the images and agree with the final report above. Workstation performed: [id number]     CT head wo contrast    Result Date: 3/6/2024  Narrative: CT BRAIN - WITHOUT CONTRAST INDICATION:   ams. COMPARISON: MRI brain 1/10/2024 TECHNIQUE:  CT examination of the brain was performed.  Multiplanar 2D reformatted images  were created from the source data. Radiation dose length product (DLP) for this visit:  791.12 mGy-cm .  This examination, like all CT scans performed in the [facility], was performed utilizing techniques to minimize radiation dose exposure, including the use of iterative  reconstruction and automated exposure control. IMAGE QUALITY:  Diagnostic. FINDINGS: PARENCHYMA: Stable postoperative changes related to left temporal lobectomy. Decreased attenuation is noted in periventricular and subcortical white matter demonstrating an appearance that is statistically most likely to represent mild microangiopathic change. Prominent bilateral basal ganglia and dentate nuclei calcifications. No CT signs of acute infarction.  No intracranial mass, mass effect or midline shift.  No acute parenchymal hemorrhage. VENTRICLES AND EXTRA-AXIAL SPACES:  Ventricles and extra-axial CSF spaces are prominent commensurate with the degree of volume loss.  No hydrocephalus.  No acute extra-axial hemorrhage. VISUALIZED ORBITS: Normal visualized orbits. PARANASAL SINUSES: Mild right maxillary sinus mucosal thickening with superimposed retention cysts. New air-fluid levels within the left maxillary and bilateral sphenoid sinuses. CALVARIUM AND EXTRACRANIAL SOFT TISSUES: Left temporal craniotomy. Opacification of the bilateral mastoid air cells, new since 1/8/2024     Impression: No acute intracranial abnormality. Stable postoperative changes related to left temporal lobectomy. Mild chronic microangiopathic ischemic changes. New air-fluid levels within the left maxillary and bilateral sphenoid sinuses suggestive of acute on chronic sinusitis. New bilateral mastoid fluid. Workstation performed: [id number]     XR chest portable ICU    Result Date: 3/5/2024  Narrative: XR CHEST PORTABLE ICU INDICATION: confirm for ng tube. COMPARISON: Chest radiograph performed earlier the same day. FINDINGS: Interval placement of enteric tube which  courses below the level of the diaphragm and tip is beneath the field-of-view. Sidehole projects over the stomach. Left central catheter in the lower SVC as before. Patchy opacities are noted in both lungs, right greater than left. No pneumothorax or pleural effusion. Normal cardiomediastinal silhouette. Bones are unremarkable for age. Normal upper abdomen.     Impression: Enteric tube placement as above. Bilateral patchy opacities, right greater than left. Workstation performed: [id number]     XR chest portable ICU    Result Date: 3/5/2024  Narrative: XR CHEST PORTABLE ICU INDICATION: Hypoxia. COVID-positive 2/21/2024. COMPARISON: CXR and chest CT 3/4/2024. FINDINGS: Left central catheter in lower SVC. Persistent extensive bilateral groundglass opacity. No pneumothorax or pleural effusion. Normal cardiomediastinal silhouette. Bones are unremarkable for age. Normal upper abdomen.     Impression: Persistent extensive bilateral groundglass opacity. Workstation performed: [id number]     CT chest abdomen pelvis w contrast    Result Date: 3/4/2024  Narrative: CT CHEST, ABDOMEN AND PELVIS WITH IV CONTRAST INDICATION: Abdominal pain. Severe COVID at time of admission. Recent bacterial pneumonia. COMPARISON: February 26, 2024 TECHNIQUE: CT examination of the chest, abdomen and pelvis was performed. Multiplanar 2D reformatted images were created from the source data. This examination, like all CT scans performed in the [facility], was performed utilizing techniques to minimize radiation dose exposure, including the use of iterative reconstruction and automated exposure control. Radiation dose length product (DLP) for this visit: 603.66 mGy-cm IV Contrast: 100 mL of iohexol (OMNIPAQUE) Enteric Contrast: Not administered. FINDINGS: CHEST LUNGS: Combination of groundglass and consolidative airspace opacities are seen throughout the lungs with relative sparing of the anterior pulmonary parenchyma. Groundglass  density, predominant in the central mid and upper lung zones, is now developing what appears to be more fibrotic appearance including some traction bronchiolectasis. This finding appears to represent evolving sequela of pneumonitis related to  viral pneumonia (COVID). Alveolar consolidation in the posterior lung bases featuring associated volume loss and enhancement is improved as compared with previous examination suspicious for a combination of bacterial pneumonia and atelectasis. New dense patchy opacity in the lateral aspect of the right middle lobe with both groundglass and alveolar consolidative properties on image 58 of series 301 suspicious for worsening bacterial pneumonia in that location. PLEURA: Unremarkable. HEART/GREAT VESSELS: Left internal jugular central venous catheter, tip at cavoatrial junction. Heart is unremarkable for patient's age. Borderline fusiform ectasia of ascending thoracic aorta up to 39 mm. MEDIASTINUM AND KIRBY: No mediastinal or hilar lymphadenopathy. Enteric tube in the esophagus extending into the stomach. CHEST WALL AND LOWER NECK: Unremarkable. ABDOMEN LIVER/BILIARY TREE: Unremarkable. GALLBLADDER: Layering high density in the dependent lumen of the gallbladder fundus, probably vicarious excretion of injected intravenous contrast, though alternatively this could represent layering sludge. No gallbladder wall thickening or pericholecystic inflammatory edema. SPLEEN: Unremarkable. PANCREAS: Unremarkable. ADRENAL GLANDS: Unremarkable. KIDNEYS/URETERS: Lobulated renal contours related to scarring. Bilateral renal cysts. No solid renal mass, urinary tract calculus, or hydronephrosis. STOMACH AND BOWEL: Enteric tube tip within the stomach. No bowel thickening or bowel obstruction. Surgical changes of segmental bowel resection with right mid abdominal ostomy. Subcutaneous gas and cellulitic changes noted surrounding the ostomy site suggesting infection. No drainable abscess collection.  "No bowel obstruction. APPENDIX: Surgically removed. ABDOMINOPELVIC CAVITY: Small volume of free pelvic ascites with some layering high density in its dependent portion. No abscess. No pneumoperitoneum. No lymphadenopathy. VESSELS: Unremarkable for patient's age. PELVIS REPRODUCTIVE ORGANS: Unremarkable for patient's age. URINARY BLADDER: Bubble of gas within the urinary bladder presumably related to recent instrumentation. No bladder wall mass. ABDOMINAL WALL/INGUINAL REGIONS: As mentioned above, cellulitic edema and subcutaneous gas within the body wall fat surrounding the ostomy site. BONES: No acute fracture or suspicious osseous lesion.     Impression: Evolving changes in the lungs which represent some combination of evolving pneumonitis secondary to viral COVID infection, improving atelectasis/bacterial infection in the posterior lower lung zones, and developing focal bacterial type pneumonia in the lateral aspect of the right middle lobe. Cellulitic edema and subcutaneous gas in the fatty soft tissue surrounding the ostomy site in the right mid abdomen. This probably represents infectious cellulitis and air tracking backward from the ostomy site. No drainable abscess seen. Small volume of complex ascites again noted in the dependent hemipelvis. This examination was marked \"immediate notification\" in Epic in order to begin the standard process by which the radiology reading room liaison alerts the referring practitioner. Workstation performed: [id number]     XR chest portable ICU    Result Date: 3/4/2024  Narrative: XR CHEST PORTABLE ICU INDICATION: sob. COMPARISON: 2/28/2024 FINDINGS: Endogastric tube extends to the left upper quadrant of the abdomen. Artifacts project over the chest and upper abdomen. A left IJ central venous catheter has the tip projecting in the right atrial region. Patchy bilateral pulmonary infiltrates are present mostly in the mid to lower lung fields. This may represent bilateral " pneumonia or perhaps patchy pulmonary edema. Correlate with clinical scenario. There may be minimal left pleural effusion. No right-sided effusion. No visible pneumothorax. Unremarkable cardiomediastinal silhouette.  Atherosclerotic vascular calcifications noted. Bones are unremarkable for age. Normal upper abdomen.     Impression: Line and tube appear satisfactory. Patchy bilateral pulmonary infiltrates which may be pneumonia or pulmonary edema. Possible small left pleural effusion Workstation performed: [id number]       I have personally reviewed labs, imaging studies, and pertinent reports.      This note has been generated by voice recognition software system.  Therefore, there may be spelling, grammar, and or syntax errors. Please contact if questions arise.

## 2024-04-01 NOTE — ASSESSMENT & PLAN NOTE
Time spent providing supportive listening to spouse Min at bedside. Extended offer of support to patient's children.  Min continues to be consistently present and invested in patient's care.   Also supported by her daughter, son, and robust  group of friends, brother, and [place] community  Decisional apparatus:  Patient is not competent on my exam today.  If competence is lost, patient's substitute decision maker would default to spouse Min by [place] Act 169.  Advance Directive / Living Will / POLST:  None on file, unable to complete

## 2024-04-01 NOTE — PLAN OF CARE
Problem: Prexisting or High Potential for Compromised Skin Integrity  Goal: Skin integrity is maintained or improved  Description: INTERVENTIONS:  - Identify patients at risk for skin breakdown  - Assess and monitor skin integrity  - Assess and monitor nutrition and hydration status  - Monitor labs   - Assess for incontinence   - Turn and reposition patient  - Assist with mobility/ambulation  - Relieve pressure over bony prominences  - Avoid friction and shearing  - Provide appropriate hygiene as needed including keeping skin clean and dry  - Evaluate need for skin moisturizer/barrier cream  - Collaborate with interdisciplinary team   - Patient/family teaching  - Consider wound care consult   Outcome: Progressing     Problem: INFECTION - ADULT  Goal: Absence or prevention of progression during hospitalization  Description: INTERVENTIONS:  - Assess and monitor for signs and symptoms of infection  - Monitor lab/diagnostic results  - Monitor all insertion sites, i.e. indwelling lines, tubes, and drains  - Monitor endotracheal if appropriate and nasal secretions for changes in amount and color  - Mobile appropriate cooling/warming therapies per order  - Administer medications as ordered  - Instruct and encourage patient and family to use good hand hygiene technique  - Identify and instruct in appropriate isolation precautions for identified infection/condition  Outcome: Progressing     Problem: SAFETY ADULT  Goal: Patient will remain free of falls  Description: INTERVENTIONS:  - Educate patient/family on patient safety including physical limitations  - Instruct patient to call for assistance with activity   - Consult OT/PT to assist with strengthening/mobility   - Keep Call bell within reach  - Keep bed low and locked with side rails adjusted as appropriate  - Keep care items and personal belongings within reach  - Initiate and maintain comfort rounds  - Make Fall Risk Sign visible to staff  - Offer Toileting every  2 Hours, in advance of need  - Initiate/Maintain bed alarm  - Obtain necessary fall risk management equipment: non skid footwear  - Apply yellow socks and bracelet for high fall risk patients  - Consider moving patient to room near nurses station  Outcome: Progressing     Problem: RESPIRATORY - ADULT  Goal: Achieves optimal ventilation and oxygenation  Description: INTERVENTIONS:  - Assess for changes in respiratory status  - Assess for changes in mentation and behavior  - Position to facilitate oxygenation and minimize respiratory effort  - Oxygen administered by appropriate delivery if ordered  - Initiate smoking cessation education as indicated  - Encourage broncho-pulmonary hygiene including cough, deep breathe, Incentive Spirometry  - Assess the need for suctioning and aspirate as needed  - Assess and instruct to report SOB or any respiratory difficulty  - Respiratory Therapy support as indicated  Outcome: Progressing     Problem: SKIN/TISSUE INTEGRITY - ADULT  Goal: Skin Integrity remains intact(Skin Breakdown Prevention)  Description: Assess:  -Perform Flavio assessment every shift   -Clean and moisturize skin every day   -Inspect skin when repositioning, toileting, and assisting with ADLS  -Assess under medical devices such as ronny  every hour   -Assess extremities for adequate circulation and sensation     Bed Management:  -Have minimal linens on bed & keep smooth, unwrinkled  -Change linens as needed when moist or perspiring  -Avoid sitting or lying in one position for more than 2 hours while in bed  -Keep HOB at 45 degrees     Toileting:  -Offer bedside commode  -Assess for incontinence every hour  -Use incontinent care products after each incontinent episode such as moisture barrier cream     Activity:  -Mobilize patient 3 times a day  -Encourage activity and walks on unit  -Encourage or provide ROM exercises   -Turn and reposition patient every 2 Hours  -Use appropriate equipment to lift or move  patient in bed  -Instruct/ Assist with weight shifting every hour when out of bed in chair  -Consider limitation of chair time 2 hour intervals    Skin Care:  -Avoid use of baby powder, tape, friction and shearing, hot water or constrictive clothing  -Relieve pressure over bony prominences using allevyn   -Do not massage red bony areas    Next Steps:  -Teach patient strategies to minimize risks such as weight shifting    -Consider consults to  interdisciplinary teams such as PT  Outcome: Progressing  Goal: Incision(s), wounds(s) or drain site(s) healing without S/S of infection  Description: INTERVENTIONS  - Assess and document dressing, incision, wound bed, drain sites and surrounding tissue  - Provide patient and family education  - Consider nutrition services referral as needed  Outcome: Progressing     Problem: Potential for Falls  Goal: Patient will remain free of falls  Description: INTERVENTIONS:  - Educate patient/family on patient safety including physical limitations  - Instruct patient to call for assistance with activity   - Consult OT/PT to assist with strengthening/mobility   - Keep Call bell within reach  - Keep bed low and locked with side rails adjusted as appropriate  - Keep care items and personal belongings within reach  - Initiate and maintain comfort rounds  - Make Fall Risk Sign visible to staff  - Offer Toileting every 2 Hours, in advance of need  - Initiate/Maintain bed alarm  - Obtain necessary fall risk management equipment: non skid footwear  - Apply yellow socks and bracelet for high fall risk patients  - Consider moving patient to room near nurses station  Outcome: Progressing     Problem: Nutrition/Hydration-ADULT  Goal: Nutrient/Hydration intake appropriate for improving, restoring or maintaining nutritional needs  Description: Monitor and assess patient's nutrition/hydration status for malnutrition. Collaborate with interdisciplinary team and initiate plan and interventions as  ordered.  Monitor patient's weight and dietary intake as ordered or per policy. Utilize nutrition screening tool and intervene as necessary. Determine patient's food preferences and provide high-protein, high-caloric foods as appropriate.     INTERVENTIONS:  - Monitor oral intake, urinary output, labs, and treatment plans  - Assess nutrition and hydration status and recommend course of action  - Evaluate amount of meals eaten  - Assist patient with eating if necessary   - Allow adequate time for meals  - Recommend/ encourage appropriate diets, oral nutritional supplements, and vitamin/mineral supplements  - Order, calculate, and assess calorie counts as needed  - Recommend, monitor, and adjust tube feedings and TPN/PPN based on assessed needs  - Assess need for intravenous fluids  - Provide specific nutrition/hydration education as appropriate  - Include patient/family/caregiver in decisions related to nutrition  Outcome: Progressing     Problem: COPING  Goal: Pt/Family able to verbalize concerns and demonstrate effective coping strategies  Description: INTERVENTIONS:  - Assist patient/family to identify coping skills, available support systems and cultural and spiritual values  - Provide emotional support, including active listening and acknowledgement of concerns of patient and caregivers  - Reduce environmental stimuli, as able  - Provide patient education  - Assess for spiritual pain/suffering and initiate spiritual care, including notification of Pastoral Care or [place] based community as needed  - Assess effectiveness of coping strategies  Outcome: Progressing  Goal: Will report anxiety at manageable levels  Description: INTERVENTIONS:  - Administer medication as ordered  - Teach and encourage coping skills  - Provide emotional support  - Assess patient/family for anxiety and ability to cope  Outcome: Progressing     Problem: BEHAVIOR  Goal: Pt/Family maintain appropriate behavior and adhere to behavioral  management agreement, if implemented  Description: INTERVENTIONS:  - Assess the family dynamic   - Encourage verbalization of thoughts and concerns in a socially appropriate manner  - Assess patient/family's coping skills and non-compliant behavior (including use of illegal substances).  - Utilize positive, consistent limit setting strategies supporting safety of patient, staff and others  - Initiate consult with Case Management, Spiritual Care or other ancillary services as appropriate  - If a patient's/visitor's behavior jeopardizes the safety of the patient, staff, or others, refer to organization procedure.   - Notify Security of behavior or suspected illegal substances which indicate the need for search of the patient and/or belongings  - Encourage participation in the decision making process about a behavioral management agreement; implement if patient meets criteria  Outcome: Progressing     Problem: NEUROSENSORY - ADULT  Goal: Achieves stable or improved neurological status  Description: INTERVENTIONS  - Monitor and report changes in neurological status  - Monitor vital signs such as temperature, blood pressure, glucose, and any other labs ordered   - Initiate measures to prevent increased intracranial pressure  - Monitor for seizure activity and implement precautions if appropriate      Outcome: Progressing  Goal: Achieves maximal functionality and self care  Description: INTERVENTIONS  - Monitor swallowing and airway patency with patient fatigue and changes in neurological status  - Encourage and assist patient to increase activity and self care.   - Encourage visually impaired, hearing impaired and aphasic patients to use assistive/communication devices  Outcome: Progressing     Problem: CARDIOVASCULAR - ADULT  Goal: Maintains optimal cardiac output and hemodynamic stability  Description: INTERVENTIONS:  - Monitor I/O, vital signs and rhythm  - Monitor for S/S and trends of decreased cardiac output  -  Administer and titrate ordered vasoactive medications to optimize hemodynamic stability  - Assess quality of pulses, skin color and temperature  - Assess for signs of decreased coronary artery perfusion  - Instruct patient to report change in severity of symptoms  Outcome: Progressing  Goal: Absence of cardiac dysrhythmias or at baseline rhythm  Description: INTERVENTIONS:  - Continuous cardiac monitoring, vital signs, obtain 12 lead EKG if ordered  - Administer antiarrhythmic and heart rate control medications as ordered  - Monitor electrolytes and administer replacement therapy as ordered  Outcome: Progressing     Problem: GASTROINTESTINAL - ADULT  Goal: Minimal or absence of nausea and/or vomiting  Description: INTERVENTIONS:  - Administer IV fluids if ordered to ensure adequate hydration  - Maintain NPO status until nausea and vomiting are resolved  - Nasogastric tube if ordered  - Administer ordered antiemetic medications as needed  - Provide nonpharmacologic comfort measures as appropriate  - Advance diet as tolerated, if ordered  - Consider nutrition services referral to assist patient with adequate nutrition and appropriate food choices  Outcome: Progressing  Goal: Maintains or returns to baseline bowel function  Description: INTERVENTIONS:  - Assess bowel function  - Encourage oral fluids to ensure adequate hydration  - Administer IV fluids if ordered to ensure adequate hydration  - Administer ordered medications as needed  - Encourage mobilization and activity  - Consider nutritional services referral to assist patient with adequate nutrition and appropriate food choices  Outcome: Progressing  Goal: Maintains adequate nutritional intake  Description: INTERVENTIONS:  - Monitor percentage of each meal consumed  - Identify factors contributing to decreased intake, treat as appropriate  - Assist with meals as needed  - Monitor I&O, weight, and lab values if indicated  - Obtain nutrition services referral as  needed  Outcome: Progressing  Goal: Establish and maintain optimal ostomy function  Description: INTERVENTIONS:  - Assess bowel function  - Encourage oral fluids to ensure adequate hydration  - Administer IV fluids if ordered to ensure adequate hydration   - Administer ordered medications as needed  - Encourage mobilization and activity  - Nutrition services referral to assist patient with appropriate food choices  - Assess stoma site  - Consider wound care consult   Outcome: Progressing  Goal: Oral mucous membranes remain intact  Description: INTERVENTIONS  - Assess oral mucosa and hygiene practices  - Implement preventative oral hygiene regimen  - Implement oral medicated treatments as ordered  - Initiate Nutrition services referral as needed  Outcome: Progressing     Problem: GENITOURINARY - ADULT  Goal: Maintains or returns to baseline urinary function  Description: INTERVENTIONS:  - Assess urinary function  - Encourage oral fluids to ensure adequate hydration if ordered  - Administer IV fluids as ordered to ensure adequate hydration  - Administer ordered medications as needed  - Offer frequent toileting  - Follow urinary retention protocol if ordered  Outcome: Progressing  Goal: Urinary catheter remains patent  Description: INTERVENTIONS:  - Assess patency of urinary catheter  - If patient has a chronic marie, consider changing catheter if non-functioning  - Follow guidelines for intermittent irrigation of non-functioning urinary catheter  Outcome: Progressing     Problem: METABOLIC, FLUID AND ELECTROLYTES - ADULT  Goal: Electrolytes maintained within normal limits  Description: INTERVENTIONS:  - Monitor labs and assess patient for signs and symptoms of electrolyte imbalances  - Administer electrolyte replacement as ordered  - Monitor response to electrolyte replacements, including repeat lab results as appropriate  - Instruct patient on fluid and nutrition as appropriate  Outcome: Progressing  Goal: Fluid  balance maintained  Description: INTERVENTIONS:  - Monitor labs   - Monitor I/O and WT  - Instruct patient on fluid and nutrition as appropriate  - Assess for signs & symptoms of volume excess or deficit  Outcome: Progressing  Goal: Glucose maintained within target range  Description: INTERVENTIONS:  - Monitor Blood Glucose as ordered  - Assess for signs and symptoms of hyperglycemia and hypoglycemia  - Administer ordered medications to maintain glucose within target range  - Assess nutritional intake and initiate nutrition service referral as needed  Outcome: Progressing     Problem: HEMATOLOGIC - ADULT  Goal: Maintains hematologic stability  Description: INTERVENTIONS  - Assess for signs and symptoms of bleeding or hemorrhage  - Monitor labs  - Administer supportive blood products/factors as ordered and appropriate  Outcome: Progressing     Problem: MUSCULOSKELETAL - ADULT  Goal: Maintain or return mobility to safest level of function  Description: INTERVENTIONS:  - Assess patient's ability to carry out ADLs; assess patient's baseline for ADL function and identify physical deficits which impact ability to perform ADLs (bathing, care of mouth/teeth, toileting, grooming, dressing, etc.)  - Assess/evaluate cause of self-care deficits   - Assess range of motion  - Assess patient's mobility  - Assess patient's need for assistive devices and provide as appropriate  - Encourage maximum independence but intervene and supervise when necessary  - Involve family in performance of ADLs  - Assess for home care needs following discharge   - Consider OT consult to assist with ADL evaluation and planning for discharge  - Provide patient education as appropriate  Outcome: Progressing     Problem: MOBILITY - ADULT  Goal: Maintain or return to baseline ADL function  Description: INTERVENTIONS:  -  Assess patient's ability to carry out ADLs; assess patient's baseline for ADL function and identify physical deficits which impact  ability to perform ADLs (bathing, care of mouth/teeth, toileting, grooming, dressing, etc.)  - Assess/evaluate cause of self-care deficits   - Assess range of motion  - Assess patient's mobility; develop plan if impaired  - Assess patient's need for assistive devices and provide as appropriate  - Encourage maximum independence but intervene and supervise when necessary  - Involve family in performance of ADLs  - Assess for home care needs following discharge   - Consider OT consult to assist with ADL evaluation and planning for discharge  - Provide patient education as appropriate  Outcome: Progressing  Goal: Maintains/Returns to pre admission functional level  Description: INTERVENTIONS:  - Perform AM-PAC 6 Click Basic Mobility/ Daily Activity assessment daily.  - Set and communicate daily mobility goal to care team and patient/family/caregiver.   - Collaborate with rehabilitation services on mobility goals if consulted  - Out of bed for toileting  - Record patient progress and toleration of activity level   Outcome: Progressing

## 2024-04-01 NOTE — PROGRESS NOTES
Progress Note - Infectious Disease   Carla Hunt 59 y.o. female MRN: [id number]  Unit/Bed#: MICU 10 Encounter: [id number]      Impression/Plan:    1. Acute hypoxic respiratory failure, likely multifactorial, with both COVID and bacterial pneumonia contributing.  Also consider persistent inflammation, fibrosis as seems quite steroid responsive in past.  Most recently extubated 3/1.  Patient with worsening respiratory status requiring intubation again 3/11.  Suspect ongoing hypoxia related to persistent COVID-pneumonia, superimposed bacterial infection, inflammatory component/lung fibrosis related to COVID, now with profound deconditioning and muscle weakness.  Status post bronchoscopy and trach 3/12 with excessive secretions seen from the lower lobes bilaterally.  BAL culture from 3/11 shows heavy growth of Stenotrophomonas maltophilia.  PJP DFA negative. While the patient has grown this before and she certainly may be colonized, given worsening CT findings, intubation and prolonged steroids would treat as a true pathogen.  Status post 10 days of vancomycin and cefepime, 14-day course of remdesivir/Paxlovid 3/15, 14-day course of antibiotics with minocycline for stenotrophomonas pneumonia. Status post repeat bronchoscopy 3/17 for airway clearance with continued thick secretions.  Previous Fungitell positive but low clinical concern for invasive fungal infection status post blood transfusion, Candida in sputum, multiple recent antibiotics which can affect functional.  Histoplasma urine antigen negative.  Patient was clinically improving and weaned to trach collar but 3/30 had worsening lethargy, again placed on the ventilator 3/31.  Repeat CT chest showed new consolidation in the right upper lobe, CT head unchanged.  Antibiotics restarted.   -For now, continue IV cefepime and vancomycin  -Steroid dosing per critical care  -No indication to treat Candida in respiratory culture, this is respiratory  colonization  -If clinically deteriorates with concern for progressive sepsis would start meropenem 1g IV Q8 and PO minocycline  -Will check cryptococcal serum antigen, aspergillus antigen  -If no improvement in mental status, recommend MRI brain     2. SIRS versus sepsis.  Fluctuating fever, WBC.  Likely due to persistent COVID, bacterial pneumonia. CT C/A/P 3/10 shows stable groundglass and nodular opacities, inflammation around stoma. Now with dehiscence of her abdominal wound following staple removal, status post wound VAC placement 3/13. Patient afebrile, was clinically improving but again worsened 3/30 with lethargy, increased hypoxia.  Repeat CT with new consolidation in the right upper lobe.   -Antibiotics as above  -Follow temperatures closely  -Recheck WBC in AM to monitor infection  -Supportive care as per the primary service     3. Recurrent bacterial pneumonia.  The patient has completed multiple treatment courses over the hospitalization. Prior BAL cultures with Pseudomonas and stenotrophomonas.  Unclear if pathogens or colonizers.  Status post 10 days levofloxacin.  CT C/A/P showed evolving changes likely all due to sequelae of COVID, infections.  Noted to have worsening hypoxia and purulent secretions on bronchoscopy 3/11.  BAL culture with heavy growth of Stenotrophomonas maltophilia, Candida.  Completed 14-day course of minocycline 3/27              -Monitor off further antibiotics              -Wean O2 as able     4. Severe COVID, present on admission.  Patient was treated with a 10-day course of remdesivir, dexamethasone and was given 1 dose of Tocilizumab on admission.  COVID antigen was negative prior to coming off isolation.  Had positive COVID PCR from BAL 2/16, status post another 5-day course of remdesivir.  Patient has remained on high-dose systemic corticosteroid throughout her hospitalization.  COVID antigen positive and PCR is also positive 3/3 and Ct 26.8, consistent with high viral  replication.  Repeat PCR positive with Ct closer to 30. Status post 14-day course of remdesivir/Paxlovid 3/15/2024.  Rapid COVID antigen negative 3/25 and 3/27  -Steroid wean per ICU  -No additional antivirals indicated  -Patient started on Bactrim for PJP prophylaxis     5. 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  Patient is status post exploratory laparotomy with ileocecectomy and end ileostomy creation 2/20.   End ileostomy is with ongoing ischemic changes which is likely contributing to the imaging findings and ongoing SIRS response.  Now with wound dehiscence status post staple removal, status post wound VAC placement 3/13, removed but replaced due to bleeding  -Serial exams  -Surgery follow-up   -no current signs of infection, need for antibiotics for this indication     B-cell lymphoma, on maintenance rituxan, which is currently postponed.  Rituximab is a risk factor for persistent COVID infection.    7.  ELIESER.  Likely multifactorial due to prerenal status, medications.   -Monitor creatinine closely   -Dose adjust antibiotics as needed    8.  Anemia, thrombocytopenia, lymphopenia/neutropenia.  Patient has had persistent lymphopenia throughout this admission, likely due to rituximab, viral infection, high-dose steroids.  Now with worsening anemia, neutropenia, and thrombocytopenia.  Bactrim discontinued 3/18. CMV PCR negative.  Not a likely side effect of minocycline.  Immunoglobulins are low.  Started on Granix 3/27   -Steroid wean per primary   -Transfusion support per primary   -Hematology consulted, recommending Granix as unable to perform BM Bx due to positioning, other co morbidities    -Monitor CBC    Above management plan to continue antibiotics, consider MRI brain with Dr. Bowden of critical care. Discussed with the patient's  and daughter at bedside.  ID will follow.    Antibiotics:  Off antibiotics     Subjective:  The patient has had worsening lethargy over the past 2 days and is  now unresponsive.  She is afebrile and white blood cell count now normalized.  Blood pressure is stable.  Repeat CT chest showed right upper lobe consolidation so she has been started on antibiotics    Objective:  Vitals:  Temp:  [97.9 °F (36.6 °C)-99.5 °F (37.5 °C)] 98.1 °F (36.7 °C)  HR:  [129-142] 133  Resp:  [26-37] 31  BP: ()/(43-60) 92/53  SpO2:  [93 %-98 %] 96 %  Temp (24hrs), Av.6 °F (37 °C), Min:97.9 °F (36.6 °C), Max:99.5 °F (37.5 °C)  Current: Temperature: 98.1 °F (36.7 °C)    Physical Exam:   General Appearance:  Ill-appearing, lethargic   Neck: Trach in place.   Lungs:   Minimal rhonchi bilaterally   Heart:  RRR; no murmur, rub or gallop   Abdomen:   Midline ostomy with brown stool, wound VAC in place   Extremities: No edema   Skin: No new rashes or lesions.        Labs:   All pertinent labs and imaging studies were personally reviewed  Results from last 7 days   Lab Units 24  0430 24  1235 24  0545 24  0044   WBC Thousand/uL 5.29  --  2.47* 2.18*   HEMOGLOBIN g/dL 7.3* 7.7* 6.1* 6.5*   PLATELETS Thousands/uL 36*  --  51* 50*     Results from last 7 days   Lab Units 24  0430 24  0545 24  0454   SODIUM mmol/L 143 141 141   POTASSIUM mmol/L 3.6 3.7 3.5   CHLORIDE mmol/L 109* 108 112*   CO2 mmol/L 21 21 18*   BUN mg/dL 104* 78* 61*   CREATININE mg/dL 1.24 0.96 0.70   EGFR ml/min/1.73sq m 47 65 95   CALCIUM mg/dL 10.6* 10.2 10.0   AST U/L  --   --  12*   ALT U/L  --   --  45   ALK PHOS U/L  --   --  196*     Results from last 7 days   Lab Units 24  1431 24  0545   PROCALCITONIN ng/ml 7.19* 6.28*       Results from last 7 days   Lab Units 24  0545   CRP mg/L 331.2*                       Imaging:  CT chest with new right upper lobe consolidation

## 2024-04-01 NOTE — PROGRESS NOTES
Vancomycin IV Pharmacy-to-Dose Consultation    Carla Hunt is a 59 y.o. female who is currently receiving Vancomycin IV with management by the Pharmacy Consult service.    Relevant clinical data and objective / subjective history reviewed.    Vancomycin Assessment:  Indication: Other, Pneumonia (goal -600, trough >10) Ostomy infection  Status: critically ill, afebrile  Micro:   3/31 Sputum culture: 4+ GNR  3/31 Blood cultures x 2: in process  Procalcitonin: 6.28 (3/31)  Renal Function: sCr 1.24 (from 0.96); UOP 1 mL/kg/hr  Days of Therapy: 1  Current Dose: new start (last dose: 1750 mg given 4/1 at 1029)  Goal AUC(24h)/Trough: 400-600, trough >10    Vancomycin Plan:  New Dosing: change to 1750 mg IV x 1, followed by 1000 mg IV q24h (next dose: 4/1 at 2200)  Estimated AUC: 429 mcg*hr/mL  Estimated Trough: 11.2 mcg/mL  Next Level: Random 4/2 at 0600  Renal Function Monitoring: Daily BMP and UOP      Pharmacy will continue to follow closely for s/sx of nephrotoxicity, infusion reactions and appropriateness of therapy.  BMP and CBC will be ordered per protocol. We will continue to follow the patient’s culture results and clinical progress daily.     Marixa Pham, PharmD, [facility]  Critical Care Clinical Pharmacist  [phone]

## 2024-04-01 NOTE — CASE MANAGEMENT
Case Management Progress Note    Patient name Carla Hunt  Location MICU 11/MICU 11 MRN [id number]  : 1965 Date 2024       LOS (days): 82  Geometric Mean LOS (GMLOS) (days):   Days to GMLOS:        OBJECTIVE:        Current admission status: Inpatient  Preferred Pharmacy:   [id number] [place] - [address] [place] [id number]  Phone: [phone] Fax: [phone]    [facility] - [address] [address]  Phone: [phone] Fax: [phone]    Primary Care Provider: Tony Guevara MD    Primary Insurance: [employer]  Secondary Insurance: MEDICARE    PROGRESS NOTE: CM completed chart review, pt not medically cleared for discharge at this time.  CM will continue to follow for discharge planning needs.

## 2024-04-01 NOTE — PROGRESS NOTES
"Progress Note -urology  Carla Hunt 59 y.o. female MRN: [id number]  Unit/Bed#: MICU 11 Encounter: [id number]    Assessment & Plan:    Urinary retention:  -Likely in the setting of critically ill condition.  -Status post urethral Ortiz catheter insertion on 3/27 volumes of 500.  -Maintain urethral Ortiz catheter at this time.  -Void trial at rehab  -Encourage ambulation  -Provide a good bowel regimen    Gross hematuria:  -Appears to have started overnight.  -Patient with three-way 18 [place] Ortiz catheter in place.  Urine upon evaluation dark punch colored.  Manual irrigation with 300 cc revealed immediate clearing.  Do not think CBI is required at this time may continue manual irrigation every 4 hours with 240 cc of normal saline/sterile water.  -Urology will reevaluate tomorrow.  If worsens can consider CBI.  Please reach out to urology  -Patient with thrombocytopenia, platelet count 36,  -hemoglobin 7.3  -Last CT scan on 3/10/20/2024 without contrast of  tract revealed no bilateral renal cysts extensive renal calcifications again noted suggestive of medullary sponge kidney mild right fullness as before without distal obstructive pathology.  Urinary bladder decompressed with Ortiz catheter in place.  -CT scan of abdomen and pelvis with contrast ordered.  Will help with further evaluate  tract.  Urology will follow imaging.  Will help to evaluate for clot burden and if urethral Ortiz catheter needs to be upsized or any additional surgical intervention from urology standpoint.      Subjective/Objective   Chief Complaint:   Subjective: Intubated unable to provide subjective information    Objective:     Blood pressure 110/60, pulse (!) 129, temperature 98.3 °F (36.8 °C), temperature source Axillary, resp. rate (!) 33, height 5' 8\" (1.727 m), weight 70.4 kg (155 lb 3.3 oz), SpO2 93%.,Body mass index is 23.6 kg/m².      Intake/Output Summary (Last 24 hours) at 4/1/2024 0655  Last data filed at 4/1/2024 " 0615  Gross per 24 hour   Intake 2528.9 ml   Output 2105 ml   Net 423.9 ml       Invasive Devices       Peripheral Intravenous Line  Duration             Long-Dwell Peripheral IV (Midline) 03/08/24 Left Cephalic Vein 23 days    Peripheral IV 03/30/24 Right Antecubital 2 days              Drain  Duration             Ileostomy RUQ 40 days    NG/OG/Enteral Tube Nasogastric 14 Fr Right nare 26 days    Urethral Catheter Three way 18 Fr. <1 day              Airway  Duration             Surgical Airway Shiley Cuffed 19 days                  Physical Exam  Constitutional:       General: She is not in acute distress.     Appearance: She is normal weight. She is ill-appearing and toxic-appearing.   HENT:      Head: Normocephalic and atraumatic.      Right Ear: External ear normal.      Left Ear: External ear normal.      Mouth/Throat:      Pharynx: Oropharynx is clear.   Eyes:      General: No scleral icterus.     Conjunctiva/sclera: Conjunctivae normal.   Cardiovascular:      Rate and Rhythm: Regular rhythm. Tachycardia present.      Pulses: Normal pulses.   Pulmonary:      Comments: Intubated  Abdominal:      General: There is no distension.      Tenderness: There is no abdominal tenderness.   Musculoskeletal:      Cervical back: Normal range of motion.   Skin:     General: Skin is warm and dry.   Neurological:      Comments: Intubated            Lab, Imaging and other studies:CBC:   Lab Results   Component Value Date    WBC 5.29 04/01/2024    HGB 7.3 (L) 04/01/2024    HCT 22.6 (L) 04/01/2024    MCV 95 04/01/2024    PLT 36 (L) 04/01/2024    RBC 2.39 (L) 04/01/2024    MCH 30.5 04/01/2024    MCHC 32.3 04/01/2024    RDW 17.8 (H) 04/01/2024   , CMP:   Lab Results   Component Value Date    SODIUM 143 04/01/2024    K 3.6 04/01/2024     (H) 04/01/2024    CO2 21 04/01/2024     (H) 04/01/2024    CREATININE 1.24 04/01/2024    CALCIUM 10.6 (H) 04/01/2024    EGFR 47 04/01/2024     VTE Pharmacologic Prophylaxis: Reason for  no pharmacologic prophylaxis defer to primary team, hematuria  VTE Mechanical Prophylaxis: sequential compression device    Namrata Hauser PA-C

## 2024-04-01 NOTE — PROGRESS NOTES
Medical Oncology/Hematology Progress Note  Carla Hunt, female, 59 y.o., 1965,  MICU 10/MICU 10, [id number]     Assessment and Plan  1. Teto marginal zone lymphoma, stage IV, with 14q+ and 18q+   2. Acute Pancytopenia    Patient's Pancytopenia is most likely multifactorial, including patient's history of marginal zone lymphoma with bone marrow involvement, history of Bendamustine (alkylating agent) more than the Rituximab, also patient with recent complex multiple inciting events and illness as listed above including the surgery, hemorrhagic shock and multiple lines of antibiotics including cephalosporins. Patient was also on Lamotrigine for seizure disorder, which was reported to possible cause severe cases of neutropenia/ agranulocytosis.     Biopsy was initially recommended but unable to obtain given patient intubated and there was technical difficulty to have it done and to have the patient in prone position.  Deferred.     S/p filgrastim X5 doses 3/27 - 3/31/24 with good response, WBC today 5.29, was as low as 0.90 on 3/26     Hemoglobin was 6.1 on 3/31 s/p 1 unit RBCs then 7.7 and today 7.3 ; folic acid and B12 WNL, there is iron deficiency component but deferring IV iron now given critically ill and on antibiotic.  Workup negative for hemolysis.    Platelets was 21 on 3/30 s/p 1 unit then up to 50, 51 and today platelets down to 36.  Suspected multifactorial as above, no evidence of hemolysis, less likely ITP, and regardless patient is already on high-dose steroid, lately on Solu-Medrol 40 mg IV daily.     will hold off further G-CSF at that moment; continue to monitor daily CBC with differential and transfuse if hemoglobin less than 7 or platelets less than 20 with a higher threshold platelet less than 50 if evidence of bleeding.     Critical care team following and managing.     HPI  59 y.o. female with hx remarkable of Teto Marginal Zone Lymphoma ; she was established with 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      ___________________________    Outpatient follow up plan: To be determined pending clinical course  Communication with team:  Did communicate with critical care team.  I did review this patient with Dr. Agustin and they are in agreement with this plan.          Subjective:  Sister on the phonePatient was seen and examined today with my attending Dr. Agustin, sedated and intubated in the ICU unit, could not obtain review of system/not communicating.     Objective:     Medication Administration - last 24 hours from 03/31/2024 1910 to 04/01/2024 1910         Date/Time Order Dose Route Action Action by     04/01/2024 1812 EDT nystatin (MYCOSTATIN) powder -- Topical Given Veronica Case RN     04/01/2024 0834 EDT nystatin (MYCOSTATIN) powder -- Topical Given Veronica Case RN     04/01/2024 0825 EDT chlorhexidine (PERIDEX) 0.12 % oral rinse 15 mL 15 mL Mouth/Throat Given Veronica Case RN     03/31/2024 2017 EDT chlorhexidine (PERIDEX) 0.12 % oral rinse 15 mL 15 mL Mouth/Throat Given Elana Tapia RN     04/01/2024 1336 EDT levalbuterol (XOPENEX) inhalation solution 1.25 mg 1.25 mg Nebulization Given Tania Esqueda, RT      04/01/2024 0719 EDT levalbuterol (XOPENEX) inhalation solution 1.25 mg 1.25 mg Nebulization Given Chris Agustin, RT     03/31/2024 1920 EDT levalbuterol (XOPENEX) inhalation solution 1.25 mg 1.25 mg Nebulization Given Marcelina Garcia, RT     04/01/2024 0834 EDT polyethylene glycol (MIRALAX) packet 17 g 17 g Per NG Tube Given Veronica Case, RN     04/01/2024 1810 EDT topiramate (TOPAMAX) 6 mg/ml oral suspension 50 mg 50 mg Per PEG Tube Given Veronica Case RN     04/01/2024 0841 EDT topiramate (TOPAMAX) 6 mg/ml oral suspension 50 mg 50 mg Per PEG Tube Given Veronica Case RN     04/01/2024 1336 EDT sodium chloride 3 % inhalation solution 4 mL 4 mL Nebulization Given Tania Esqueda, RT     04/01/2024 0719 EDT sodium chloride 3 % inhalation solution 4 mL 4 mL Nebulization Given Chris Agustin, RT     03/31/2024 1920 EDT sodium chloride 3 % inhalation solution 4 mL 4 mL Nebulization Given Marcelina Garcia, RT     04/01/2024 1811 EDT venlafaxine (EFFEXOR) tablet 12.5 mg 12.5 mg Oral Given Veronica Case RN     04/01/2024 1203 EDT venlafaxine (EFFEXOR) tablet 12.5 mg 12.5 mg Oral Given Hallie Bailey, RN     04/01/2024 0832 EDT venlafaxine (EFFEXOR) tablet 12.5 mg 12.5 mg Oral Given Veronica Case RN     04/01/2024 0825 EDT pantoprazole (PROTONIX) injection 40 mg 40 mg Intravenous Given Veronica Case RN     03/31/2024 2022 EDT pantoprazole (PROTONIX) injection 40 mg 40 mg Intravenous Given Elana Tapia, RN     04/01/2024 0906 EDT fentaNYL injection 25 mcg 25 mcg Intravenous Given Veronica Case RN     04/01/2024 0835 EDT sodium chloride (OCEAN) 0.65 % nasal spray 2 spray 2 spray Each Nare Given Veronica Case RN     03/31/2024 2017 EDT sodium chloride (OCEAN) 0.65 % nasal spray 2 spray 2 spray Each Nare Given Elana Tapia RN     04/01/2024 0847 EDT lacosamide (VIMPAT) oral solution 100 mg 100 mg Oral Given Veronica Case RN     03/31/2024 2022 EDT lacosamide (VIMPAT) oral solution 100 mg 100 mg Oral Given Elana Tapia RN      04/01/2024 1609 EDT insulin regular (HumuLIN R,NovoLIN R) 1 Units/mL in sodium chloride 0.9 % 100 mL infusion 4 Units/hr Intravenous Rate/Dose Change Hallie Bailey RN     04/01/2024 1410 EDT insulin regular (HumuLIN R,NovoLIN R) 1 Units/mL in sodium chloride 0.9 % 100 mL infusion 2 Units/hr Intravenous Rate/Dose Change Veronica Case RN     04/01/2024 1213 EDT insulin regular (HumuLIN R,NovoLIN R) 1 Units/mL in sodium chloride 0.9 % 100 mL infusion 1 Units/hr Intravenous Rate/Dose Change Hallie Bailey RN     04/01/2024 0615 EDT insulin regular (HumuLIN R,NovoLIN R) 1 Units/mL in sodium chloride 0.9 % 100 mL infusion 6 Units/hr Intravenous Rate/Dose Change Elana Tapia RN     04/01/2024 0420 EDT insulin regular (HumuLIN R,NovoLIN R) 1 Units/mL in sodium chloride 0.9 % 100 mL infusion 3 Units/hr Intravenous New Bag Elana Tapia RN     04/01/2024 0416 EDT insulin regular (HumuLIN R,NovoLIN R) 1 Units/mL in sodium chloride 0.9 % 100 mL infusion 3 Units/hr Intravenous Rate/Dose Change Elana Tapia RN     04/01/2024 0208 EDT insulin regular (HumuLIN R,NovoLIN R) 1 Units/mL in sodium chloride 0.9 % 100 mL infusion 3 Units/hr Intravenous Rate/Dose Change Elana Tapia RN     04/01/2024 0027 EDT insulin regular (HumuLIN R,NovoLIN R) 1 Units/mL in sodium chloride 0.9 % 100 mL infusion 3 Units/hr Intravenous Rate/Dose Change Elana Tapia RN     03/31/2024 2214 EDT insulin regular (HumuLIN R,NovoLIN R) 1 Units/mL in sodium chloride 0.9 % 100 mL infusion 9 Units/hr Intravenous Rate/Dose Change Elana Tapia RN     03/31/2024 2016 EDT insulin regular (HumuLIN R,NovoLIN R) 1 Units/mL in sodium chloride 0.9 % 100 mL infusion 9 Units/hr Intravenous Rate/Dose Change Elana Tapia RN     04/01/2024 0835 EDT methylPREDNISolone sodium succinate (Solu-MEDROL) injection 40 mg 40 mg Intravenous Given Veronica Case RN     04/01/2024 1026 EDT sulfamethoxazole-trimethoprim  "(BACTRIM DS) 800-160 mg per tablet 1 tablet 1 tablet Oral Given Harish Garner RN     04/01/2024 0831 EDT modafinil (PROVIGIL) tablet 200 mg 200 mg Per NG Tube Given Veronica Case RN     04/01/2024 1828 EDT ceFEPime (MAXIPIME) 2,000 mg in dextrose 5 % 50 mL IVPB 0 mg Intravenous Stopped Veronica Case RN     04/01/2024 0615 EDT ceFEPime (MAXIPIME) 2,000 mg in dextrose 5 % 50 mL IVPB 0 mg Intravenous Stopped Elana Tapia RN     04/01/2024 0448 EDT ceFEPime (MAXIPIME) 2,000 mg in dextrose 5 % 50 mL IVPB 2,000 mg Intravenous New Bag Elana Tapia RN     03/31/2024 2200 EDT ceFEPime (MAXIPIME) 2,000 mg in dextrose 5 % 50 mL IVPB 0 mg Intravenous Stopped Elana Tapia RN     03/31/2024 2052 EDT ceFEPime (MAXIPIME) 2,000 mg in dextrose 5 % 50 mL IVPB 2,000 mg Intravenous New Bag Elana Tapia RN     04/01/2024 0838 EDT furosemide (LASIX) injection 80 mg 80 mg Intravenous Not Given Veronica Case RN     04/01/2024 1213 EDT vancomycin (VANCOCIN) 1,750 mg in sodium chloride 0.9 % 500 mL IVPB 0 mg Intravenous Stopped Hallie Bailey RN     04/01/2024 1029 EDT vancomycin (VANCOCIN) 1,750 mg in sodium chloride 0.9 % 500 mL IVPB 1,750 mg Intravenous New Bag Harish Garner RN     04/01/2024 1735 EDT multi-electrolyte (ISOLYTE-S PH 7.4) bolus 500 mL 0 mL Intravenous Stopped Hallie Bailey RN     04/01/2024 1322 EDT multi-electrolyte (ISOLYTE-S PH 7.4) bolus 500 mL 500 mL Intravenous New Bag Veronica Case RN     04/01/2024 1817 EDT ceFEPime (MAXIPIME) 2,000 mg in dextrose 5 % 50 mL IVPB 2,000 mg Intravenous New Bag Veronica Case, RN     04/01/2024 1308 EDT iohexol (OMNIPAQUE) 350 MG/ML injection (MULTI-DOSE) 100 mL 100 mL Intravenous Given Rubia Adams            /57   Pulse (!) 127   Temp 98.1 °F (36.7 °C) (Oral)   Resp (!) 28   Ht 5' 8\" (1.727 m)   Wt 70.3 kg (155 lb)   SpO2 96%   BMI 23.57 kg/m²       Physical Exam  Patient is sedated, trach to vent, not communicating  Ill-appearing, " pale  Regular rhythm but tachycardic ~ 120's   No petechial rash   Ostomy in place ; abdomen soft   Unable to assess strength and neuro exam limited due to sedated       Recent Results (from the past 48 hour(s))   Fingerstick Glucose (POCT)    Collection Time: 03/30/24  8:05 PM   Result Value Ref Range    POC Glucose 116 65 - 140 mg/dl   Fingerstick Glucose (POCT)    Collection Time: 03/30/24 10:12 PM   Result Value Ref Range    POC Glucose 138 65 - 140 mg/dl   Fingerstick Glucose (POCT)    Collection Time: 03/31/24 12:04 AM   Result Value Ref Range    POC Glucose 149 (H) 65 - 140 mg/dl   TEG 6 Global Hemostasis with Lysis    Collection Time: 03/31/24 12:44 AM   Result Value Ref Range    CKR(REACTION TIME) 6.7 4.6 - 9.1 Min    CKLY30 0.0 0.0 - 2.6 %    CRTMA(RAPIDTEG MAX AMPLITUDE) 71.1 (H) 52 - 70 mm    CFFMA (FUNCTIONAL FIBRINOGEN MAX AMPLITUDE) >52 (H) 15 - 32 mm   CBC and differential    Collection Time: 03/31/24 12:44 AM   Result Value Ref Range    WBC 2.18 (L) 4.31 - 10.16 Thousand/uL    RBC 2.13 (L) 3.81 - 5.12 Million/uL    Hemoglobin 6.5 (L) 11.5 - 15.4 g/dL    Hematocrit 20.6 (L) 34.8 - 46.1 %    MCV 97 82 - 98 fL    MCH 30.5 26.8 - 34.3 pg    MCHC 31.6 31.4 - 37.4 g/dL    RDW 17.1 (H) 11.6 - 15.1 %    MPV 11.5 8.9 - 12.7 fL    Platelets 50 (L) 149 - 390 Thousands/uL   Manual Differential(PHLEBS Do Not Order)    Collection Time: 03/31/24 12:44 AM   Result Value Ref Range    Segmented % 53 43 - 75 %    Bands % 28 (H) 0 - 8 %    Lymphocytes % 2 (L) 14 - 44 %    Monocytes % 5 4 - 12 %    Eosinophils % 0 0 - 6 %    Basophils % 0 0 - 1 %    Metamyelocytes % 2 (H) 0 - 1 %    Myelocytes % 5 (H) 0 - 1 %    Atypical Lymphocytes % 5 (H) <=0 %    Absolute Neutrophils 1.77 (L) 1.85 - 7.62 Thousand/uL    Absolute Lymphocytes 0.15 (L) 0.60 - 4.47 Thousand/uL    Absolute Monocytes 0.11 0.00 - 1.22 Thousand/uL    Absolute Eosinophils 0.00 0.00 - 0.40 Thousand/uL    Absolute Basophils 0.00 0.00 - 0.10 Thousand/uL    Total  Counted      nRBC 20 (H) 0 - 2 /100 WBC    Dohle Bodies Present     Toxic Vacuolated Neutrophils Present     RBC Morphology Present     Platelet Estimate Decreased (A) Adequate    Giant PLTs Present     Anisocytosis Present     Ovalocytes Present     Poikilocytes Present     Tear Drop Cells Present    Fingerstick Glucose (POCT)    Collection Time: 03/31/24  2:09 AM   Result Value Ref Range    POC Glucose 146 (H) 65 - 140 mg/dl   Fingerstick Glucose (POCT)    Collection Time: 03/31/24  4:10 AM   Result Value Ref Range    POC Glucose 142 (H) 65 - 140 mg/dl   Magnesium    Collection Time: 03/31/24  5:45 AM   Result Value Ref Range    Magnesium 2.6 1.9 - 2.7 mg/dL   Phosphorus    Collection Time: 03/31/24  5:45 AM   Result Value Ref Range    Phosphorus 5.5 (H) 2.7 - 4.5 mg/dL   CBC and differential    Collection Time: 03/31/24  5:45 AM   Result Value Ref Range    WBC 2.47 (L) 4.31 - 10.16 Thousand/uL    RBC 2.01 (L) 3.81 - 5.12 Million/uL    Hemoglobin 6.1 (L) 11.5 - 15.4 g/dL    Hematocrit 19.5 (L) 34.8 - 46.1 %    MCV 97 82 - 98 fL    MCH 30.3 26.8 - 34.3 pg    MCHC 31.3 (L) 31.4 - 37.4 g/dL    RDW 17.2 (H) 11.6 - 15.1 %    MPV 12.7 8.9 - 12.7 fL    Platelets 51 (L) 149 - 390 Thousands/uL   Basic metabolic panel    Collection Time: 03/31/24  5:45 AM   Result Value Ref Range    Sodium 141 135 - 147 mmol/L    Potassium 3.7 3.5 - 5.3 mmol/L    Chloride 108 96 - 108 mmol/L    CO2 21 21 - 32 mmol/L    ANION GAP 12 4 - 13 mmol/L    BUN 78 (H) 5 - 25 mg/dL    Creatinine 0.96 0.60 - 1.30 mg/dL    Glucose 105 65 - 140 mg/dL    Calcium 10.2 8.4 - 10.2 mg/dL    eGFR 65 ml/min/1.73sq m   C-reactive protein    Collection Time: 03/31/24  5:45 AM   Result Value Ref Range    .2 (H) <3.0 mg/L   Manual Differential(PHLEBS Do Not Order)    Collection Time: 03/31/24  5:45 AM   Result Value Ref Range    Segmented % 36 (L) 43 - 75 %    Bands % 37 (H) 0 - 8 %    Lymphocytes % 1 (L) 14 - 44 %    Monocytes % 9 4 - 12 %    Eosinophils %  1 0 - 6 %    Basophils % 0 0 - 1 %    Metamyelocytes % 8 (H) 0 - 1 %    Myelocytes % 6 (H) 0 - 1 %    Promyelocytes % 2 (H) 0 - 0 %    Absolute Neutrophils 1.80 (L) 1.85 - 7.62 Thousand/uL    Absolute Lymphocytes 0.02 (L) 0.60 - 4.47 Thousand/uL    Absolute Monocytes 0.22 0.00 - 1.22 Thousand/uL    Absolute Eosinophils 0.02 0.00 - 0.40 Thousand/uL    Absolute Basophils 0.00 0.00 - 0.10 Thousand/uL    Total Counted      nRBC 24 (H) 0 - 2 /100 WBC    Dohle Bodies Present     Toxic Vacuolated Neutrophils Present     RBC Morphology Present     Platelet Estimate Decreased (A) Adequate    Giant PLTs Present     Pathology Review Yes (A) No    Differential Comment see note     Anisocytosis Present     Ovalocytes Present     Poikilocytes Present     Polychromasia Present     Tear Drop Cells Present    Path Slide Review    Collection Time: 03/31/24  5:45 AM   Result Value Ref Range    Case Report       Clinical Pathology Report                         Case: [id number]                                  Authorizing Provider:  Joe Lazcano DO             Collected:           03/31/2024 0545              Ordering Location:      [facility]     Received:            03/31/2024 [address] [place] [address]                                                                Pathologist:           Evan Escobar MD                                                                           Specimen:    Arm, Right                                                                                 Path Slide       PERIPHERAL BLOOD SMEAR: LEFT-SHIFTED PANCYTOPENIA AND CIRCULATING NUCLEATED RED BLOOD CELLS; SEE IMPRESSION    IMPRESSION:  The peripheral blood smear shows left-shifted pancytopenia with reactive changes. There is no circulating blast population, significant dysplasia, rouleaux formation, infectious organisms, evidence  of hemolysis or platelet clumping or satellitism. Recent flow cytometry on peripheral blood (03/19/2024) showed granulocytosis with decreased CD10 expression and lymphocytopenia.    The findings are favored to be reactive in the setting of sepsis, lymphoma, and other complex underlying medical conditions. If cytopenias persist following resolution of symptoms, additional workup would be warranted at that time, as clinically indicated. If there is concern for myelophthisic anemia secondary to bone marrow infiltration by lymphoma or other processes, a bone marrow biopsy could be considered. Bone marrow biopsies are not advised in active sepsis, however, as left-shift and overlapping morphologic abnormalities occur secondary to sepsis, as well as in myeloid neoplasms, and can be difficult to differentiate. If there is concern for hemophagocytic lymphohistiocytosis (HLH), bone marrow biopsy should be performed during acute symptoms. Correlation with imaging studies as well as with clinical and other laboratory findings is recommended.     RADHA Escobar MD  [place] [address].      Procalcitonin    Collection Time: 03/31/24  5:45 AM   Result Value Ref Range    Procalcitonin 6.28 (H) <=0.25 ng/ml   Prepare Leukoreduced Platelet Pheresis (1 pheresis product = 6-8 pooled units): 1 Product, Irradiated, Leukoreduced    Collection Time: 03/31/24  5:55 AM   Result Value Ref Range    Unit Product Code Z9491X26     Unit Number [id number]     Unit ABO A     Unit RH POS     Unit Dispense Status Presumed Trans     Unit Product Volume 300 mL   Fingerstick Glucose (POCT)    Collection Time: 03/31/24  6:13 AM   Result Value Ref Range    POC Glucose 134 65 - 140 mg/dl   Fingerstick Glucose (POCT)    Collection Time: 03/31/24  8:24 AM   Result Value Ref Range    POC Glucose 135 65 - 140 mg/dl   Blood gas, venous    Collection Time: 03/31/24  8:54 AM   Result Value  Ref Range    pH, Norris 7.238 (L) 7.300 - 7.400    pCO2, Norris 52.5 (H) 42.0 - 50.0 mm Hg    pO2, Norris 67.7 (H) 35.0 - 45.0 mm Hg    HCO3, Norris 21.9 (L) 24 - 30 mmol/L    Base Excess, Norris -5.2 mmol/L    O2 Content, Norris 8.6 ml/dL    O2 HGB, VENOUS 89.9 (H) 60.0 - 80.0 %    Nasal Cannula 10     Other FIO2 60 %   Fingerstick Glucose (POCT)    Collection Time: 03/31/24 10:02 AM   Result Value Ref Range    POC Glucose 160 (H) 65 - 140 mg/dl   Sputum culture and Gram stain    Collection Time: 03/31/24 10:46 AM    Specimen: Tracheal Aspirate; Sputum   Result Value Ref Range    Sputum Culture 3+ Growth of Stenotrophomonas maltophilia (A)     Gram Stain Result 4+ Gram negative rods (A)     Gram Stain Result No polys seen (A)    Fingerstick Glucose (POCT)    Collection Time: 03/31/24 12:11 PM   Result Value Ref Range    POC Glucose 145 (H) 65 - 140 mg/dl   Blood gas, venous    Collection Time: 03/31/24 12:35 PM   Result Value Ref Range    pH, Norris 7.305 7.300 - 7.400    pCO2, Norris 40.0 (L) 42.0 - 50.0 mm Hg    pO2, Norris 75.4 (H) 35.0 - 45.0 mm Hg    HCO3, Norris 19.5 (L) 24 - 30 mmol/L    Base Excess, Norris -6.4 mmol/L    O2 Content, Norris 11.1 ml/dL    O2 HGB, VENOUS 93.8 (H) 60.0 - 80.0 %    Vent Type- AC AC     AC Rate 15     Tidal Volume 450 ml    Inspired Air (FIO2) 40     PEEP 6    Hemoglobin and hematocrit, blood    Collection Time: 03/31/24 12:35 PM   Result Value Ref Range    Hemoglobin 7.7 (L) 11.5 - 15.4 g/dL    Hematocrit 24.1 (L) 34.8 - 46.1 %   Fingerstick Glucose (POCT)    Collection Time: 03/31/24  1:52 PM   Result Value Ref Range    POC Glucose 110 65 - 140 mg/dl   Blood culture    Collection Time: 03/31/24  2:37 PM    Specimen: Arm, Left; Blood   Result Value Ref Range    Blood Culture Received in Microbiology Lab. Culture in Progress.    Blood culture    Collection Time: 03/31/24  2:50 PM    Specimen: Arm, Left; Blood   Result Value Ref Range    Blood Culture No Growth at 24 hrs.    Fingerstick Glucose (POCT)    Collection  Time: 03/31/24  4:11 PM   Result Value Ref Range    POC Glucose 157 (H) 65 - 140 mg/dl   Fingerstick Glucose (POCT)    Collection Time: 03/31/24  6:09 PM   Result Value Ref Range    POC Glucose 195 (H) 65 - 140 mg/dl   Fingerstick Glucose (POCT)    Collection Time: 03/31/24  7:53 PM   Result Value Ref Range    POC Glucose 253 (H) 65 - 140 mg/dl   Fingerstick Glucose (POCT)    Collection Time: 03/31/24 10:13 PM   Result Value Ref Range    POC Glucose 203 (H) 65 - 140 mg/dl   Fingerstick Glucose (POCT)    Collection Time: 04/01/24 12:27 AM   Result Value Ref Range    POC Glucose 117 65 - 140 mg/dl   Fingerstick Glucose (POCT)    Collection Time: 04/01/24  2:08 AM   Result Value Ref Range    POC Glucose 121 65 - 140 mg/dl   Fingerstick Glucose (POCT)    Collection Time: 04/01/24  4:16 AM   Result Value Ref Range    POC Glucose 133 65 - 140 mg/dl   Basic metabolic panel    Collection Time: 04/01/24  4:30 AM   Result Value Ref Range    Sodium 143 135 - 147 mmol/L    Potassium 3.6 3.5 - 5.3 mmol/L    Chloride 109 (H) 96 - 108 mmol/L    CO2 21 21 - 32 mmol/L    ANION GAP 13 4 - 13 mmol/L     (H) 5 - 25 mg/dL    Creatinine 1.24 0.60 - 1.30 mg/dL    Glucose 131 65 - 140 mg/dL    Calcium 10.6 (H) 8.4 - 10.2 mg/dL    eGFR 47 ml/min/1.73sq m   Magnesium    Collection Time: 04/01/24  4:30 AM   Result Value Ref Range    Magnesium 2.5 1.9 - 2.7 mg/dL   Phosphorus    Collection Time: 04/01/24  4:30 AM   Result Value Ref Range    Phosphorus 5.5 (H) 2.7 - 4.5 mg/dL   CBC and differential    Collection Time: 04/01/24  4:30 AM   Result Value Ref Range    WBC 5.29 4.31 - 10.16 Thousand/uL    RBC 2.39 (L) 3.81 - 5.12 Million/uL    Hemoglobin 7.3 (L) 11.5 - 15.4 g/dL    Hematocrit 22.6 (L) 34.8 - 46.1 %    MCV 95 82 - 98 fL    MCH 30.5 26.8 - 34.3 pg    MCHC 32.3 31.4 - 37.4 g/dL    RDW 17.8 (H) 11.6 - 15.1 %    Platelets 36 (L) 149 - 390 Thousands/uL   Blood gas, venous    Collection Time: 04/01/24  4:30 AM   Result Value Ref  Range    pH, Norris 7.249 (L) 7.300 - 7.400    pCO2, Norris 46.6 42.0 - 50.0 mm Hg    pO2, Norris 47.6 (H) 35.0 - 45.0 mm Hg    HCO3, Norris 19.9 (L) 24 - 30 mmol/L    Base Excess, Norris -6.9 mmol/L    O2 Content, Norris 9.7 ml/dL    O2 HGB, VENOUS 78.6 60.0 - 80.0 %    Vent Type- AC AC     AC Rate 16     Tidal Volume 450 ml    Inspired Air (FIO2) 40     PEEP 6    IgG, IgA, IgM    Collection Time: 04/01/24  4:30 AM   Result Value Ref Range    IGA 57 (L) 66 - 433 mg/dL     (L) 635 - 1,741 mg/dL    IGM <20 (L) 45 - 281 mg/dL   Manual Differential(PHLEBS Do Not Order)    Collection Time: 04/01/24  4:30 AM   Result Value Ref Range    Segmented % 39 (L) 43 - 75 %    Bands % 10 (H) 0 - 8 %    Lymphocytes % 2 (L) 14 - 44 %    Monocytes % 6 4 - 12 %    Eosinophils % 0 0 - 6 %    Basophils % 0 0 - 1 %    Metamyelocytes % 26 (H) 0 - 1 %    Myelocytes % 8 (H) 0 - 1 %    Promyelocytes % 7 (H) 0 - 0 %    Atypical Lymphocytes % 2 (H) <=0 %    Absolute Neutrophils 2.59 1.85 - 7.62 Thousand/uL    Absolute Lymphocytes 0.21 (L) 0.60 - 4.47 Thousand/uL    Absolute Monocytes 0.32 0.00 - 1.22 Thousand/uL    Absolute Eosinophils 0.00 0.00 - 0.40 Thousand/uL    Absolute Basophils 0.00 0.00 - 0.10 Thousand/uL    Total Counted      nRBC 20 (H) 0 - 2 /100 WBC    Dohle Bodies Present     RBC Morphology Present     Platelet Estimate Decreased (A) Adequate    Large Platelet Present     Anisocytosis Present     Andrea Cells Present     Ovalocytes Present     Poikilocytes Present     Polychromasia Present     Schistocytes Present     Tear Drop Cells Present    Fingerstick Glucose (POCT)    Collection Time: 04/01/24  5:52 AM   Result Value Ref Range    POC Glucose 153 (H) 65 - 140 mg/dl   Prepare Leukoreduced RBC: 1 Units, Irradiated, Leukoreduced    Collection Time: 04/01/24  5:55 AM   Result Value Ref Range    Unit Product Code N8201D67     Unit Number [id number]     Unit ABO A     Unit RH NEG     Crossmatch Compatible     Unit Dispense Status Presumed  Trans     Unit Product Volume 350 mL   Fingerstick Glucose (POCT)    Collection Time: 04/01/24  8:16 AM   Result Value Ref Range    POC Glucose 145 (H) 65 - 140 mg/dl   Prepare Leukoreduced Platelet Pheresis (1 pheresis product = 6-8 pooled units): 1 Product, Leukoreduced, Irradiated    Collection Time: 04/01/24  9:09 AM   Result Value Ref Range    Unit Product Code H7449Q97     Unit Number [id number]     Unit ABO O     Unit RH POS     Unit Dispense Status Issued     Unit Product Volume 300 mL   Fingerstick Glucose (POCT)    Collection Time: 04/01/24 10:11 AM   Result Value Ref Range    POC Glucose 147 (H) 65 - 140 mg/dl   Fingerstick Glucose (POCT)    Collection Time: 04/01/24 12:12 PM   Result Value Ref Range    POC Glucose 109 65 - 140 mg/dl   Fingerstick Glucose (POCT)    Collection Time: 04/01/24  2:08 PM   Result Value Ref Range    POC Glucose 127 65 - 140 mg/dl   Procalcitonin    Collection Time: 04/01/24  2:31 PM   Result Value Ref Range    Procalcitonin 7.19 (H) <=0.25 ng/ml   Echo follow up/limited w/ contrast if indicated    Collection Time: 04/01/24  3:30 PM   Result Value Ref Range    RVID d 3.6 cm    A4C EF 61 %    Left ventricular stroke volume (2D) 45.00 mL    IVSd 1.10 cm    Tricuspid annular plane systolic excursion 1.60 cm    LVPWd 1.10 cm    LA size 1.3 cm    FS 35 28 - 44    LVIDS 2.60 cm    IVS 1.1 cm    LVIDd 4.00 cm    LEFT VENTRICLE SYSTOLIC VOLUME (MOD BIPLANE) 2D 25 mL    LV DIASTOLIC VOLUME (MOD BIPLANE) 2D 70 mL    MV E' Tissue Velocity Lateral 11 cm/s    MV E' Tissue Velocity Septal 14 cm/s    LVSV, 2D 45 mL    BSA 1.83 m2    LV EF 70     Est. RA pres 3.0 mmHg   Fingerstick Glucose (POCT)    Collection Time: 04/01/24  3:52 PM   Result Value Ref Range    POC Glucose 140 65 - 140 mg/dl   Protime-INR    Collection Time: 04/01/24  3:53 PM   Result Value Ref Range    Protime 20.1 (H) 11.6 - 14.5 seconds    INR 1.75 (H) 0.84 - 1.19   APTT    Collection Time: 04/01/24  3:53 PM   Result  Value Ref Range    PTT 35 23 - 37 seconds   Fibrinogen    Collection Time: 04/01/24  3:53 PM   Result Value Ref Range    Fibrinogen 1,061 (H) 207 - 520 mg/dL   Fingerstick Glucose (POCT)    Collection Time: 04/01/24  6:09 PM   Result Value Ref Range    POC Glucose 117 65 - 140 mg/dl       XR chest portable    Result Date: 4/1/2024  Narrative: XR CHEST PORTABLE INDICATION: AMS, possible aspiration. COMPARISON: Chest radiograph and CT of the chest 3/31/2024. FINDINGS: Tracheostomy in unchanged position. NG tube tip below the level of the left hemidiaphragm, extending inferior outside of the field-of-view. When compared to chest radiograph 3/31/2024, there is improvement in the previously described groundglass opacities in the left lung. The previously mentioned focal consolidation in the right midlung zone has not significantly changed. No pneumothorax or  pleural effusion. Normal cardiomediastinal silhouette. Bones are unremarkable for age. Normal upper abdomen.     Impression: Focal consolidation in the right middle lung zone is approximately the same when compared to 3/31/2024, concerning for pneumonia. Improvement in background groundglass consolidations in the left lung when compared to 3/31/2024. Resident: LOUISE LEUNG I, the attending radiologist, have reviewed the images and agree with the final report above. Workstation performed: [id number]     XR chest portable ICU    Result Date: 3/31/2024  Narrative: XR CHEST PORTABLE ICU INDICATION: tachypnea. COMPARISON: CXR 3/29/2024 and 3/22/2024, chest CT 3/31/2024. FINDINGS: Tracheostomy projecting over the trachea. NG tube below the diaphragm. Persistent groundglass opacity with new consolidation in the right midlung. No pneumothorax or pleural effusion. Normal cardiomediastinal silhouette. Bones are unremarkable for age. Normal upper abdomen.     Impression: Persistent groundglass opacity with new consolidation in the right midlung. See chest CT for further  evaluation. Workstation performed: [id number]     CT chest w contrast    Result Date: 3/31/2024  Narrative: CT CHEST WITH IV CONTRAST INDICATION: bilateral opacities, tachypnea. COMPARISON: Compared with 3/10/2024 TECHNIQUE: CT examination of the chest was performed. Multiplanar 2D reformatted images were created from the source data. This examination, like all CT scans performed in the [facility], was performed utilizing techniques to minimize radiation dose exposure, including the use of iterative reconstruction and automated exposure control. Radiation dose length product (DLP) for this visit: 436.63 mGy-cm IV Contrast: 85 mL of iohexol (OMNIPAQUE) FINDINGS: LUNGS: Bilateral parenchymal groundglass haziness with focal areas of consolidation like change new in the right upper lobe peripherally and in the lower lobes bilaterally posteriorly. Breathing motion artifact limits evaluation of bronchiectatic changes  in the lower lobes bilaterally.. There is no tracheal or endobronchial lesion. PLEURA: Unremarkable. HEART/GREAT VESSELS: Heart is unremarkable for patient's age. No thoracic aortic aneurysm. MEDIASTINUM AND KIRBY: Unremarkable. CHEST WALL AND LOWER NECK: Enlarged heterogeneous right thyroid lobe. Left lobe not seen. Tracheostomy tube in place. VISUALIZED STRUCTURES IN THE UPPER ABDOMEN: Nasogastric tube in the stomach with fluid. OSSEOUS STRUCTURES: No acute fracture or destructive osseous lesion.     Impression: Bilateral parenchymal infiltrative changes with consolidation like focus in the right upper lobe is new. Relative worsening. Workstation performed: [id number]     CT head wo contrast    Result Date: 3/31/2024  Narrative: CT BRAIN - WITHOUT CONTRAST INDICATION:   AMS. COMPARISON: 3/13/2024 TECHNIQUE:  CT examination of the brain was performed.  Multiplanar 2D reformatted images were created from the source data. Radiation dose length product (DLP) for this visit:  856.42 mGy-cm .   This examination, like all CT scans performed in the [facility], was performed utilizing techniques to minimize radiation dose exposure, including the use of iterative  reconstruction and automated exposure control. IMAGE QUALITY:  Diagnostic. FINDINGS: PARENCHYMA: Cystic encephalomalacia and gliosis in the left middle cranial fossa in the left temporal pole subjacent to a pterional craniotomy redemonstrated indicative of left temporal lobectomy. Stable basal ganglia calcifications are bilaterally symmetric. Mild periventricular hypodensities noted. There are also bilateral cerebellar calcifications in the region of the dentate nuclei. VENTRICLES AND EXTRA-AXIAL SPACES: Stable. No hydrocephalus. VISUALIZED ORBITS: Conjugate gaze to the right. PARANASAL SINUSES: Mucosal thickening CALVARIUM AND EXTRACRANIAL SOFT TISSUES: Left pterional craniotomy again noted     Impression: 1. No acute intracranial hemorrhage, mass effect or edema. 2. 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 3. 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 Differential diagnosis could include treatment related changes versus other etiologies.. Workstation performed: [id number]     XR chest portable ICU    Result Date: 3/30/2024  Narrative: XR CHEST PORTABLE ICU INDICATION: tachypnea. COMPARISON: CXR 3/22/2024, chest CT 3/10/2024. FINDINGS: Tracheostomy. NG tube below the diaphragm. Worsening bilateral groundglass opacity. No pneumothorax or pleural effusion. Normal cardiomediastinal silhouette. Bones are unremarkable for age. Persistent mild elevation of the right diaphragm.     Impression: Worsening bilateral groundglass opacity. Workstation performed: [id number]     FL barium swallow video w speech    Result Date: 3/27/2024  Narrative: A video barium swallow study was performed by the Department of Speech Pathology. Please refer to the  report for the official interpretation. The images are stored for archival purposes only. Study images were not formally reviewed by the Radiology Department.    VAS upper limb venous duplex scan, unilateral/limited    Result Date: 3/24/2024  Narrative:  [facility] REPORT CLINICAL: Indications: Patient presents with left upper extremity edema x few days. Risk Factors The patient has history of CKD.  FINDINGS:  Left                       Thrombus           Cephalic Upper Arm Distal  Acute - Occlusive  Ceph AC                    Acute - Occlusive     CONCLUSION:  Impression RIGHT UPPER LIMB LIMITED: Unable to evaluate the neck veins due to medical devises.  LEFT UPPER LIMB: No evidence of acute or chronic deep vein thrombosis. Unable to evaluate IJV and innominate vein due medical devises. There is evidence of acute occlusive superficial thrombophlebitis noted in the cephalic vein at the elbow and distal upper arm. Doppler evaluation shows a normal response to augmentation maneuvers.  Technical findings were given to Dr. Sindi Recinos.  SIGNATURE: Electronically Signed by: ABIMAEL GONZALES DO on 2024-03-24 09:56:59 PM    XR chest portable    Result Date: 3/22/2024  Narrative: XR CHEST PORTABLE INDICATION: hypoxia. COMPARISON: Compared with 3/20/2024 FINDINGS: Tracheostomy tube. Feeding tube in the stomach. Mild prominent interstitial markings. No pneumothorax or pleural effusion. Normal cardiomediastinal silhouette. Bones are unremarkable for age. Normal upper abdomen.     Impression: Mild congestive changes. Workstation performed: [id number]     XR chest portable ICU    Result Date: 3/20/2024  Narrative: XR CHEST PORTABLE ICU INDICATION: f/u pneumonia. COMPARISON: Radiograph March 14, 2024 FINDINGS: Tracheostomy tube in place. Enteric tube coursing into the stomach. Multifocal patchy airspace opacities in a bronchiolar distribution, particularly in the lower lobes, overall slightly improved. No pneumothorax or  pleural effusion. Normal cardiomediastinal silhouette. Bones are unremarkable for age. Normal upper abdomen.     Impression: Overall mild improvement in multifocal bronchopneumonia, likely aspiration related. Workstation performed: [id number]     Bronchoscopy    Result Date: 3/19/2024  Narrative: Table formatting from the original result was not included. [facility] Endoscopy [address] [facility] [address] [phone] DATE OF SERVICE: 3/11/24 PHYSICIAN(S): Attending: No Staff Documented Fellow: No Staff Documented INDICATION: Acute respiratory failure with hypoxia (HCC) POST-OP DIAGNOSIS: See the impression below. PREPROCEDURE: Standard airway preparation completed per respiratory therapy protocol.  Informed consent was obtained. Images reviewed prior to the procedure.  A Time Out was performed. No suspicion or identified risk for TB or other airborne infectious disease; bronchoscopy procedure being performed for diagnostic purposes. PROCEDURE: Bronchoscopy DETAILS OF PROCEDURE: Patient was taken to the procedure room where a time out was performed to confirm correct patient and correct procedure. The patient's blood pressure, heart rate, level of consciousness and oxygen were monitored throughout the procedure. The procedure was not difficult. The patient tolerated the procedure well. There were no apparent adverse events. ANESTHESIA INFORMATION: ASA: ASA status not filed in the log. Anesthesia Type: Anesthesia type not filed in the log. FINDINGS: Normal SPECIMENS: ID Type Source Tests Collected by Time Destination A :  Bronchial Lung, Left Lower Lobe Bronchial Washing FUNGAL CULTURE, VIRUS CULTURE, BRONCHIAL CULTURE AND GRAM STAIN, LEGIONELLA CULTURE, PNEUMOCYSTIS SMEAR BY DFA ([facility]), AFB CULTURE WITH STAIN, LEUKEMIA/LYMPHOMA FLOW CYTOMETRY, CD4/CD8 BRONCHIAL ONLY, NOVEL CORONAVIRUS (COVID-19), PCR [facility] Jarett Martins MD 3/11/2024  1:06 PM       Impression: Pneumonia RECOMMENDATION:   Follow up bronchoscopy     Bronchoscopy    Result Date: 3/17/2024  Narrative: Table formatting from the original result was not included. [facility] Endoscopy [address]  [place] [address] [phone] DATE OF SERVICE: 3/17/24 PHYSICIAN(S): Attending: Jarett Martins MD Fellow: Miguel Interiano MD INDICATION: Pneumonia of both lungs due to infectious organism, unspecified part of lung POST-OP DIAGNOSIS: See the impression below. PREPROCEDURE: Standard airway preparation completed per respiratory therapy protocol.  Informed consent was obtained. Images reviewed prior to the procedure.  A Time Out was performed. No suspicion or identified risk for TB or other airborne infectious disease; bronchoscopy procedure being performed for diagnostic purposes. PROCEDURE: Bronchoscopy DETAILS OF PROCEDURE: Bronchoscope was brought to patient's bedside where a time out was performed to confirm correct patient and correct procedure. The patient was already under sedation prior to the procedure. The patient's blood pressure, heart rate, oxygen and respirations were monitored throughout the procedure. The patient experienced no blood loss. The scope was introduced through the tracheostomy. The procedure was not difficult. The patient tolerated the procedure well. There were no apparent adverse events. ANESTHESIA INFORMATION: ASA: ASA status cannot be found on the log. Anesthesia Type: Anesthesia type cannot be found on the log. FINDINGS: The upper trachea, middle trachea, lower trachea and main tracee appeared normal. Moderate, generalized erythematous and friable mucosa in the left main stem, MANDY, LLL, right main stem, RUL, bronchus intermedius, RML and RLL Moderate, thick and purulent secretions present in the left main stem, MANDY, lingula, LLL, right main stem, RUL, bronchus intermedius, RML and RLL; secretions were easily removed by therapeutic aspiration and the airway was cleared SPECIMENS: No sample  collected      Impression: Erythematous and friable airways Moderate amount of purulent secretions throughout RECOMMENDATION:  Continue with hypertonic saline nebs and as needed bronchoscopy for airway clearance  I supervised and was present for the entire procedure Jarett Martins MD [facility]     Echo follow up/limited w/ contrast if indicated    Result Date: 3/17/2024  Narrative:   Left Ventricle: The left ventricular ejection fraction is 70%. Systolic function is vigorous. Global longitudinal strain is reduced at -14%. Wall motion is normal.   Aortic Valve: There is mild regurgitation.   Aorta: The aortic root is mildly dilated. The ascending aorta is mildly dilated. The aortic root is 4.10 cm. The ascending aorta is 4.1 cm.     XR chest portable ICU    Result Date: 3/14/2024  Narrative: XR CHEST PORTABLE ICU INDICATION: resp status change. COMPARISON: 3/12/2024 FINDINGS: Unchanged lines and tubes. Worsening consolidation throughout the right lung and left lower lobe consistent with multi lobar pneumonia, possibly aspiration. No pneumothorax or pleural effusion. Normal cardiomediastinal silhouette. Bones are unremarkable for age. Normal upper abdomen.     Impression: Worsening consolidation throughout the right lung and left lower lobe consistent with multi lobar pneumonia, possibly aspiration. The study was marked in EPIC for immediate notification. Workstation performed: [id number]     CT head wo contrast    Result Date: 3/13/2024  Narrative: CT BRAIN - WITHOUT CONTRAST INDICATION:   declining neuro exam. COMPARISON: CT dated 3/9/2024. TECHNIQUE:  CT examination of the brain was performed.  Multiplanar 2D reformatted images were created from the source data. Radiation dose length product (DLP) for this visit:  840.6 mGy-cm .  This examination, like all CT scans performed in the [facility], was performed utilizing techniques to minimize radiation dose  exposure, including the use of iterative reconstruction and automated exposure control. IMAGE QUALITY:  Diagnostic. FINDINGS: PARENCHYMA: Stable postop changes status post left temporal lobectomy. No acute infarct, hemorrhage, mass or mass effect. Stable mild nonspecific white matter changes likely due to chronic microangiopathy. Stable dense calcification in the bilateral basal ganglia and dentate nuclei. VENTRICLES AND EXTRA-AXIAL SPACES:  Normal for the patient's age. VISUALIZED ORBITS: Normal visualized orbits. PARANASAL SINUSES: Left maxillary sinus mucosal thickening. Stable small fluid levels in the sphenoid sinuses. Stable bilateral mastoid effusions, left greater than right. Stable left middle ear effusion.. CALVARIUM AND EXTRACRANIAL SOFT TISSUES: Left temporal craniotomy. NG tube is noted.     Impression: No acute intracranial pathology. Stable postoperative changes status post left temporal lobectomy. Chronic microangiopathy. Stable bilateral mastoid effusions, left greater than right and left middle ear effusion.. Stable sinus inflammatory changes. Workstation performed: [id number]     XR chest portable    Result Date: 3/13/2024  Narrative: XR CHEST PORTABLE INDICATION: post tracheostomy tube placement. COMPARISON: 3/12/2024 FINDINGS: Tracheostomy is in the typical location and seems satisfactory. Endogastric tube extends out of the field-of-view to the stomach. Left IJ central catheter tip projects at the caval atrial junction. Artifacts projecting over the chest including breathing apparatus and cardiac monitor leads. Patchy infiltrates throughout much of the right lung and infiltrate at the left base. No right-sided pneumothorax appreciated. Very difficult to assess the left apical region due to multiple artifacts. No large volume pneumothorax appreciated. No pleural  fluid seen. Unremarkable cardiomediastinal silhouette.  Atherosclerotic vascular calcifications noted. Bones are unremarkable for age.  Normal upper abdomen.     Impression: Limited study. Tracheostomy appears satisfactory in position. Patchy pulmonary infiltrates throughout much of the right lung. Left basilar infiltrate. Otherwise, the left lung appears improved in aeration from prior exam. Recommend continued follow-up Workstation performed: [id number]     XR chest portable ICU    Result Date: 3/12/2024  Narrative: XR CHEST PORTABLE ICU INDICATION: post intubation. COMPARISON: 3/11/2024 at 0 823 FINDINGS: The endotracheal tube terminates 3 cm above the tracee. The left IJ approach catheter terminates in the cavoatrial junction. The endogastric tube terminates below the diaphragm beyond the imaged limits. Redemonstrated are diffuse patchy airspace opacities. No pneumothorax or pleural effusion. Normal cardiomediastinal silhouette. Bones are unremarkable for age. Normal upper abdomen.     Impression: Tubes and lines in satisfactory position. Grossly unchanged diffuse parenchymal disease. Workstation performed: [id number]     XR chest portable ICU    Result Date: 3/12/2024  Narrative: XR CHEST PORTABLE ICU INDICATION: post intubation. COMPARISON: 3/11/2024 at 0 823 FINDINGS: The endotracheal tube terminates 3 cm above the tracee. The left IJ approach catheter terminates in the cavoatrial junction. The endogastric tube terminates below the diaphragm beyond the imaged limits. Redemonstrated are diffuse patchy airspace opacities. No pneumothorax or pleural effusion. Normal cardiomediastinal silhouette. Bones are unremarkable for age. Normal upper abdomen.     Impression: Tubes and lines in satisfactory position. Grossly unchanged diffuse parenchymal disease. Workstation performed: [id number]     Bronchoscopy    Result Date: 3/12/2024  Narrative: [facility] Endoscopy [address] [facility] [address] [phone] DATE OF SERVICE: 3/12/24 PHYSICIAN(S): Attending: Dr. Jarett Martins Fellow: Dr. Nigel Escobar, PGY IV INDICATION: Tracheostomy  present (HCC), Pre- and Post- Tracheostomy placement POST-OP DIAGNOSIS: See the impression below. PREPROCEDURE: Standard airway preparation completed per respiratory therapy protocol.  Informed consent was obtained. Images reviewed prior to the procedure.  A Time Out was performed. No suspicion or identified risk for TB or other airborne infectious disease; bronchoscopy procedure being performed for diagnostic purposes. PROCEDURE: Bronchoscopy DETAILS OF PROCEDURE: Patient was taken to the procedure room where a time out was performed to confirm correct patient and correct procedure. The patient was already under sedation prior to the procedure. The patient's blood pressure, ECG, heart rate, level of consciousness, oxygen and respirations were monitored throughout the procedure. The patient experienced no blood loss. The scope was introduced through the endotracheal tube. The procedure was not difficult. The patient tolerated the procedure well. There were no apparent adverse events. ANESTHESIA INFORMATION: ASA: ASA status cannot be found on the log. Anesthesia Type: Anesthesia type cannot be found on the log. FINDINGS: Moderate, thick, purulent and white secretions present in the left lower lobar bronchus, left superior segment (LB6), left anterior basal segment (LB7+8), left lateral basal segment (LB9), left posterior basal segment (LB10), bronchus intermedius, right lower lobar bronchus, right superior segment (RB6), right medial basal segment (RB7), right anterior basal segment (RB8), right lateral basal segment (RB9) and right posterior basal segment (RB10); secretions were easily removed and the airway was cleared The lower trachea, main tracee, left main stem, left upper lobar bronchus, left apical-posterior segment (LB1+2), left upper anterior segment (LB3), lingular lobar bronchus, superior lingular segment (LB4), inferior lingular segment (LB5), right main stem, right upper lobar bronchus, right apical  segment (RB1), right posterior segment (RB2), right anterior segment (RB3), right middle lobar bronchus, right lateral segment (RB4) and right medial segment (RB5) appeared normal. SPECIMENS: No specimens collected for this procedure      Impression: Excessive secretions again seen from lower lobes bilaterally, pre-tracheostomy. Post-Tracheostomy bronchoscopy showing minimal procedural bleeding, and without significant secretions. I was present and supervised the entire procedure Jarett Martins MD [facility]     US bedside procedure    Result Date: 3/12/2024  Narrative: [id number]    US bedside procedure    Result Date: 3/12/2024  Narrative: [id number]    Bronchoscopy    Result Date: 3/11/2024  Narrative: Table formatting from the original result was not included. [facility] Endoscopy [address] [facility] [address] [phone] DATE OF SERVICE: 3/11/24 PHYSICIAN(S): Attending: Dr. Jarett Martins Fellow: Dr. Nigel Escobar, PGY IV INDICATION: Acute respiratory failure with hypoxia (HCC) POST-OP DIAGNOSIS: See the impression below. PREPROCEDURE: Standard airway preparation completed per respiratory therapy protocol.  Informed consent was obtained. Images reviewed prior to the procedure.  A Time Out was performed. No suspicion or identified risk for TB or other airborne infectious disease; bronchoscopy procedure being performed for diagnostic purposes. PROCEDURE: Bronchoscopy DETAILS OF PROCEDURE: Patient was taken to the procedure room where a time out was performed to confirm correct patient and correct procedure. The patient was already under sedation prior to the procedure. The patient's blood pressure, ECG, heart rate, oxygen, level of consciousness and respirations were monitored throughout the procedure. The patient experienced no blood loss. The scope was introduced through the endotracheal tube.  The procedure was not difficult. The patient tolerated the procedure well. There were no apparent adverse events. ANESTHESIA INFORMATION: ASA: ASA status cannot be found on the log. Anesthesia Type: Anesthesia type cannot be found on the log. FINDINGS: Excessive, thick, purulent and white secretions present in the left lower lobar bronchus, left superior segment (LB6), left anterior basal segment (LB7+8), left lateral basal segment (LB9), left posterior basal segment (LB10), bronchus intermedius, right lower lobar bronchus, right superior segment (RB6), right medial basal segment (RB7), right anterior basal segment (RB8), right lateral basal segment (RB9) and right posterior basal segment (RB10); secretions were easily removed and the airway was cleared; performed washing, sent sample for microbiology analysis The left main stem, left upper lobar bronchus, left apical-posterior segment (LB1+2), left upper anterior segment (LB3), lingular lobar bronchus, superior lingular segment (LB4), inferior lingular segment (LB5), right main stem, right upper lobar bronchus, right apical segment (RB1), right posterior segment (RB2) and right anterior segment (RB3) appeared normal. SPECIMENS: Collected for cultures, cell count, and cytology      Impression: Thick, purulent secretions mainly in the b/l lower lobe segments. RUL, RML, and MANDY were normal. RECOMMENDATION:  F/u gram stain and culture, viral culture, bacterial culture, PCP, fungal culture, COVID  I supervised and was present for the entire procedure Jarett Martins MD [facility]     XR chest portable    Result Date: 3/11/2024  Narrative: XR CHEST PORTABLE INDICATION: tachypnea. COMPARISON: CXR and chest CT 3/10/2024. FINDINGS: NG tube in stomach. Left ocular catheter at cavoatrial junction. Persistent extensive bilateral groundglass opacity. No pneumothorax or pleural effusion. Normal cardiomediastinal silhouette. Bones are  unremarkable for age. Normal upper abdomen.     Impression: Persistent extensive bilateral groundglass opacity. Workstation performed: [id number]     XR abdomen 1 view kub    Result Date: 3/11/2024  Narrative: XR ABDOMEN 1 VIEW KUB INDICATION: abdominal distension. COMPARISON: None FINDINGS: Enteric catheter courses into the distal stomach. Colostomy in the right lower quadrant Nonobstructive bowel gas pattern. No evidence of pneumoperitoneum on this supine study. Upright or left lateral decubitus imaging is more sensitive to detect subtle free air in the appropriate setting. Bilateral renal calcifications better assessed on CT. Pulmonary opacities better assessed on dedicated chest imaging. Bones are unremarkable for the patient's age.     Impression: Nonobstructive bowel gas pattern. Workstation performed: [id number]     CT chest abdomen pelvis wo contrast    Result Date: 3/10/2024  Narrative: CT CHEST, ABDOMEN AND PELVIS WITHOUT IV CONTRAST INDICATION: concern for infection. COMPARISON: 3/6/2024. TECHNIQUE: CT examination of the chest, abdomen and pelvis was performed without intravenous contrast. Multiplanar 2D reformatted images were created from the source data. This examination, like all CT scans performed in the [facility], was performed utilizing techniques to minimize radiation dose exposure, including the use of iterative reconstruction and automated exposure control. Radiation dose length product (DLP) for this visit: 549.64 mGy-cm Enteric Contrast: Not administered. FINDINGS: CHEST LUNGS: Extensive groundglass and nodular consolidation throughout both lung fields with more focal peripheral opacification particularly at the lung bases. Extensive regions of bronchiectasis most notable at the lung bases. PLEURA: Unremarkable. HEART/GREAT VESSELS: Heart is unremarkable for patient's age. No thoracic aortic aneurysm with top normal size of the ascending thoracic aorta measuring 39 mm at  the level of the left main pulmonary artery. MEDIASTINUM AND KIRBY: Unremarkable. CHEST WALL AND LOWER NECK: Left central line terminates at the caval atrial junction. ABDOMEN LIVER/BILIARY TREE: Unremarkable. GALLBLADDER: Vicarious excretion. SPLEEN: Unremarkable. PANCREAS: Unremarkable. ADRENAL GLANDS: Unremarkable. 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 Mild right renal fullness as before without distal obstructive pathology. STOMACH AND BOWEL: Nasogastric tube terminates at the gastroduodenal junction. Right lower quadrant ileostomy with a patent stoma after ileocolectomy. Diverticular disease also noted without diverticulitis. Edema and small foci of subcutaneous emphysema after recent intervention. No drainable collection. APPENDIX: No findings to suggest appendicitis. ABDOMINOPELVIC CAVITY: No ascites. No pneumoperitoneum. No lymphadenopathy. VESSELS: Unremarkable for patient's age. PELVIS REPRODUCTIVE ORGANS: Unremarkable for patient's age. URINARY BLADDER: Decompressed with a Ortiz catheter in place. ABDOMINAL WALL/INGUINAL REGIONS: Unremarkable. BONES: No acute fracture or suspicious osseous lesion.     Impression: 1. Persistent bilateral groundglass and nodular consolidation with peripheral opacification at the right greater than left lung bases. Findings remain concerning for multi lobar infiltrates with possible superimposed COVID-19 opacities. 2. Status post ileocolectomy with a right lower quadrant ileostomy again exhibiting a patent stoma. Persistent inflammatory change and subcutaneous emphysema after recent intervention. No drainable collection. 3. Bilateral renal calcifications again suggestive of medullary sponge kidney with persistent mild right renal fullness but no evidence of distal obstructive pathology. Workstation performed: [id number]     XR chest portable    Result Date: 3/10/2024  Narrative: XR CHEST  PORTABLE INDICATION: sob. COMPARISON: CXR 3/9/2024 and 3/7/2024, chest CT 3/6/2024. FINDINGS: NG tube in stomach. Left upper catheter at cavoatrial junction. Persistent extensive bilateral groundglass opacity. No pneumothorax or pleural effusion. Normal cardiomediastinal silhouette. Bones are unremarkable for age. Normal upper abdomen.     Impression: Persistent extensive bilateral groundglass opacity. Workstation performed: [id number]     CT head wo contrast    Result Date: 3/9/2024  Narrative: CT BRAIN - WITHOUT CONTRAST INDICATION:   Stroke Alert. COMPARISON: CT head from 3/6/2024 and priors, MRI of the brain from 1/10/2024 TECHNIQUE:  CT examination of the brain was performed.  Multiplanar 2D reformatted images were created from the source data. Radiation dose length product (DLP) for this visit:  859.76 mGy-cm .  This examination, like all CT scans performed in the [facility], was performed utilizing techniques to minimize radiation dose exposure, including the use of iterative  reconstruction and automated exposure control. IMAGE QUALITY:  Diagnostic. FINDINGS: PARENCHYMA: Stable postoperative changes from left temporal lobectomy. Decreased attenuation is noted in periventricular and subcortical white matter demonstrating an appearance that is statistically most likely to represent mild microangiopathic change. Prominent calcifications within bilateral basal ganglia and dentate nuclei. No CT signs of acute vascular territory infarction.  No intracranial mass, mass effect or midline shift.  No acute parenchymal hemorrhage. VENTRICLES AND EXTRA-AXIAL SPACES:  Normal for the patient's age. VISUALIZED ORBITS: Normal visualized orbits. PARANASAL SINUSES: Mild polypoid mucosal thickening of the left maxillary sinus. Layering fluid within bilateral sphenoid sinuses CALVARIUM AND EXTRACRANIAL SOFT TISSUES: No acute osseous abnormalities. Sequela of left temporal craniotomy. Large left and moderate  right mastoid effusions, unchanged. Left middle ear effusion.     Impression: -No acute intracranial abnormality. Stable microangiopathic changes. -Stable postoperative changes related to left temporal lobectomy. -Redemonstrated air-fluid levels within bilateral sphenoid sinuses, left greater than right mastoid effusions and left middle ear effusion. Clinical correlation advised. I personally communicated the findings via telephone with Ravi Jose at 3:06 p.m. on 3/9/2024. Workstation performed: [id number]     XR chest portable    Result Date: 3/9/2024  Narrative: XR CHEST PORTABLE INDICATION: tachypnea, hypoxia. COMPARISON: Chest radiograph 3/7/2024; CT chest abdomen pelvis 3/6/2024 FINDINGS: Left-sided central line with tip at the cavoatrial junction. Enteric tube tip below the level of the diaphragm. No significant interval change in multifocal patchy groundglass opacities, left side greater than right. No pneumothorax or pleural effusion. Normal cardiomediastinal silhouette. Bones are unremarkable for age. Normal upper abdomen.     Impression: No significant interval change in multifocal patchy groundglass opacities, left side greater than right. Findings are likely related to patient's COVID-19 pneumonia. Resident: SANDIP Nieto I, the attending radiologist, have reviewed the images and agree with the final report above. Workstation performed: [id number]     XR chest portable ICU    Result Date: 3/7/2024  Narrative: XR CHEST PORTABLE ICU INDICATION: sob. Patient has confirmed COVID-19. COMPARISON: Portable chest from March 5, 2024. CT of the chest, abdomen and pelvis from March 6, 2024. FINDINGS: NG tube extends into the stomach. Tip of left-sided IJ catheter in superior vena cava. Patchy opacification throughout the right lung, improved since 3/5/2024. Persistent opacification in portions of the left lower lobe. No pneumothorax or pleural effusion. Normal cardiomediastinal silhouette. Bones are unremarkable  for age. Normal upper abdomen.     Impression: Diffuse patchy opacification in the right lung, either pneumonia or asymmetric pulmonary edema, improved in appearance since 3/5/2024. Persistent atelectasis and/or consolidation in the base of the left lower lobe. Workstation performed: [id number]     CT chest abdomen pelvis wo contrast    Result Date: 3/6/2024  Narrative: CT CHEST, ABDOMEN AND PELVIS WITHOUT IV CONTRAST INDICATION: worsening SOB, abd pain. COVID-positive. COMPARISON: CT chest abdomen pelvis 3/4/2024. CT abdomen pelvis 10/21/2020. CT chest abdomen pelvis 2/20/2024. TECHNIQUE: CT examination of the chest, abdomen and pelvis was performed without intravenous contrast. Multiplanar 2D reformatted images were created from the source data. This examination, like all CT scans performed in the [facility], was performed utilizing techniques to minimize radiation dose exposure, including the use of iterative reconstruction and automated exposure control. Radiation dose length product (DLP) for this visit: 1086.03 mGy-cm Enteric Contrast: Not administered. FINDINGS: CHEST LUNGS: Evaluation partially limited by motion artifact. Increased extent of groundglass opacities and patchy consolidations throughout the lungs, particularly increased in the upper lobes compared to prior CT 3/4/2024. Lobular consolidation in the posterior lung bases is not significantly changed since most recent exam and is suspicious for atelectasis and bacterial pneumonia. Right middle lobe patchy opacity appears unchanged from most recent exam. PLEURA: Unremarkable. HEART/GREAT VESSELS: Mild atherosclerotic coronary artery calcifications. Unchanged fusiform ectasia of the ascending thoracic aorta measuring up to 40 mm (series 303 image 59). Recommendation is for follow-up low radiation dose chest CT in one year. Left IJ CVC terminating in the distal SVC. MEDIASTINUM AND KIRBY: Unremarkable. CHEST WALL AND LOWER NECK:  Prior left hemithyroidectomy. ABDOMEN LIVER/BILIARY TREE: Unremarkable. GALLBLADDER: Redemonstrated layering high density in the dependent lumen of the gallbladder. Likely vicarious excretion of injected IV contrast versus layering sludge. No gallbladder wall thickening. No pericholecystic fluid. SPLEEN: Subcentimeter splenic hypodensity (303 image 121) seen dating back to December 2023, too small to characterize but statistically likely benign. PANCREAS: Unremarkable. ADRENAL GLANDS: Unremarkable. KIDNEYS/URETERS: Bilateral renal cysts. Multiple bilateral renal calculi/calcifications again noted, again possibly due to medullary sponge kidney. Mild right pelvocaliectasis and ureterectasis. There is also fullness of the left renal pelvis. No obstructing calculi. STOMACH AND BOWEL: Enteric tube with tip terminating at the distal stomach. Postsurgical changes of ileocolectomy with end ileostomy. No bowel obstruction. Colonic diverticulosis without findings of acute diverticulitis. APPENDIX: Surgically absent. ABDOMINOPELVIC CAVITY: No ascites. No pneumoperitoneum. No lymphadenopathy. VESSELS: Atherosclerosis without abdominal aortic aneurysm. PELVIS REPRODUCTIVE ORGANS: Unremarkable for patient's age. URINARY BLADDER: Ortiz catheter within the bladder. Moderately increasing gas in the urinary bladder, likely secondary to Ortiz placement. ABDOMINAL WALL/INGUINAL REGIONS: Postsurgical changes of the anterior abdominal wall. Redemonstrated edema and subcutaneous gas within the anterior abdominal wall fat surrounding the right lower quadrant ostomy site, amount of gas slightly decreased from  prior. No discrete fluid collection. Soft tissue densities in the anterior abdominal wall may be related to subcutaneous injections. BONES: No acute fracture or suspicious osseous lesion.     Impression: 1. Increased extent of groundglass opacities and patchy consolidations throughout the lungs. Focal consolidations in the posterior  lung bases and right middle lobe are not significantly changed compared to March 4, 2024. Again, findings are likely due to  a combination of COVID-19 and bacterial pneumonia. 2. Redemonstrated edema and subcutaneous gas within the anterior abdominal wall fat surrounding the right lower quadrant ostomy site, amount of gas slightly decreased from prior. 4. Mild right pelvocaliectasis and ureterectasis. No obstructing calculi. 5. Fusiform ectasia of the ascending thoracic aorta measuring 40 mm. Recommend follow-up low radiation dose chest CT in 1 year. The study was marked in EPIC for immediate notification. Resident: LOUISE LEUNG I, the attending radiologist, have reviewed the images and agree with the final report above. Workstation performed: [id number]     CT head wo contrast    Result Date: 3/6/2024  Narrative: CT BRAIN - WITHOUT CONTRAST INDICATION:   ams. COMPARISON: MRI brain 1/10/2024 TECHNIQUE:  CT examination of the brain was performed.  Multiplanar 2D reformatted images were created from the source data. Radiation dose length product (DLP) for this visit:  791.12 mGy-cm .  This examination, like all CT scans performed in the [facility], was performed utilizing techniques to minimize radiation dose exposure, including the use of iterative  reconstruction and automated exposure control. IMAGE QUALITY:  Diagnostic. FINDINGS: PARENCHYMA: Stable postoperative changes related to left temporal lobectomy. Decreased attenuation is noted in periventricular and subcortical white matter demonstrating an appearance that is statistically most likely to represent mild microangiopathic change. Prominent bilateral basal ganglia and dentate nuclei calcifications. No CT signs of acute infarction.  No intracranial mass, mass effect or midline shift.  No acute parenchymal hemorrhage. VENTRICLES AND EXTRA-AXIAL SPACES:  Ventricles and extra-axial CSF spaces are prominent commensurate with the degree of  volume loss.  No hydrocephalus.  No acute extra-axial hemorrhage. VISUALIZED ORBITS: Normal visualized orbits. PARANASAL SINUSES: Mild right maxillary sinus mucosal thickening with superimposed retention cysts. New air-fluid levels within the left maxillary and bilateral sphenoid sinuses. CALVARIUM AND EXTRACRANIAL SOFT TISSUES: Left temporal craniotomy. Opacification of the bilateral mastoid air cells, new since 1/8/2024     Impression: No acute intracranial abnormality. Stable postoperative changes related to left temporal lobectomy. Mild chronic microangiopathic ischemic changes. New air-fluid levels within the left maxillary and bilateral sphenoid sinuses suggestive of acute on chronic sinusitis. New bilateral mastoid fluid. Workstation performed: [id number]     XR chest portable ICU    Result Date: 3/5/2024  Narrative: XR CHEST PORTABLE ICU INDICATION: confirm for ng tube. COMPARISON: Chest radiograph performed earlier the same day. FINDINGS: Interval placement of enteric tube which courses below the level of the diaphragm and tip is beneath the field-of-view. Sidehole projects over the stomach. Left central catheter in the lower SVC as before. Patchy opacities are noted in both lungs, right greater than left. No pneumothorax or pleural effusion. Normal cardiomediastinal silhouette. Bones are unremarkable for age. Normal upper abdomen.     Impression: Enteric tube placement as above. Bilateral patchy opacities, right greater than left. Workstation performed: [id number]     XR chest portable ICU    Result Date: 3/5/2024  Narrative: XR CHEST PORTABLE ICU INDICATION: Hypoxia. COVID-positive 2/21/2024. COMPARISON: CXR and chest CT 3/4/2024. FINDINGS: Left central catheter in lower SVC. Persistent extensive bilateral groundglass opacity. No pneumothorax or pleural effusion. Normal cardiomediastinal silhouette. Bones are unremarkable for age. Normal upper abdomen.     Impression: Persistent extensive bilateral  groundglass opacity. Workstation performed: [id number]     CT chest abdomen pelvis w contrast    Result Date: 3/4/2024  Narrative: CT CHEST, ABDOMEN AND PELVIS WITH IV CONTRAST INDICATION: Abdominal pain. Severe COVID at time of admission. Recent bacterial pneumonia. COMPARISON: February 26, 2024 TECHNIQUE: CT examination of the chest, abdomen and pelvis was performed. Multiplanar 2D reformatted images were created from the source data. This examination, like all CT scans performed in the [facility], was performed utilizing techniques to minimize radiation dose exposure, including the use of iterative reconstruction and automated exposure control. Radiation dose length product (DLP) for this visit: 603.66 mGy-cm IV Contrast: 100 mL of iohexol (OMNIPAQUE) Enteric Contrast: Not administered. FINDINGS: CHEST LUNGS: Combination of groundglass and consolidative airspace opacities are seen throughout the lungs with relative sparing of the anterior pulmonary parenchyma. Groundglass density, predominant in the central mid and upper lung zones, is now developing what appears to be more fibrotic appearance including some traction bronchiolectasis. This finding appears to represent evolving sequela of pneumonitis related to  viral pneumonia (COVID). Alveolar consolidation in the posterior lung bases featuring associated volume loss and enhancement is improved as compared with previous examination suspicious for a combination of bacterial pneumonia and atelectasis. New dense patchy opacity in the lateral aspect of the right middle lobe with both groundglass and alveolar consolidative properties on image 58 of series 301 suspicious for worsening bacterial pneumonia in that location. PLEURA: Unremarkable. HEART/GREAT VESSELS: Left internal jugular central venous catheter, tip at cavoatrial junction. Heart is unremarkable for patient's age. Borderline fusiform ectasia of ascending thoracic aorta up to 39 mm.  MEDIASTINUM AND KIRBY: No mediastinal or hilar lymphadenopathy. Enteric tube in the esophagus extending into the stomach. CHEST WALL AND LOWER NECK: Unremarkable. ABDOMEN LIVER/BILIARY TREE: Unremarkable. GALLBLADDER: Layering high density in the dependent lumen of the gallbladder fundus, probably vicarious excretion of injected intravenous contrast, though alternatively this could represent layering sludge. No gallbladder wall thickening or pericholecystic inflammatory edema. SPLEEN: Unremarkable. PANCREAS: Unremarkable. ADRENAL GLANDS: Unremarkable. KIDNEYS/URETERS: Lobulated renal contours related to scarring. Bilateral renal cysts. No solid renal mass, urinary tract calculus, or hydronephrosis. STOMACH AND BOWEL: Enteric tube tip within the stomach. No bowel thickening or bowel obstruction. Surgical changes of segmental bowel resection with right mid abdominal ostomy. Subcutaneous gas and cellulitic changes noted surrounding the ostomy site suggesting infection. No drainable abscess collection. No bowel obstruction. APPENDIX: Surgically removed. ABDOMINOPELVIC CAVITY: Small volume of free pelvic ascites with some layering high density in its dependent portion. No abscess. No pneumoperitoneum. No lymphadenopathy. VESSELS: Unremarkable for patient's age. PELVIS REPRODUCTIVE ORGANS: Unremarkable for patient's age. URINARY BLADDER: Bubble of gas within the urinary bladder presumably related to recent instrumentation. No bladder wall mass. ABDOMINAL WALL/INGUINAL REGIONS: As mentioned above, cellulitic edema and subcutaneous gas within the body wall fat surrounding the ostomy site. BONES: No acute fracture or suspicious osseous lesion.     Impression: Evolving changes in the lungs which represent some combination of evolving pneumonitis secondary to viral COVID infection, improving atelectasis/bacterial infection in the posterior lower lung zones, and developing focal bacterial type pneumonia in the lateral aspect of  "the right middle lobe. Cellulitic edema and subcutaneous gas in the fatty soft tissue surrounding the ostomy site in the right mid abdomen. This probably represents infectious cellulitis and air tracking backward from the ostomy site. No drainable abscess seen. Small volume of complex ascites again noted in the dependent hemipelvis. This examination was marked \"immediate notification\" in Epic in order to begin the standard process by which the radiology reading room liaison alerts the referring practitioner. Workstation performed: [id number]     XR chest portable ICU    Result Date: 3/4/2024  Narrative: XR CHEST PORTABLE ICU INDICATION: sob. COMPARISON: 2/28/2024 FINDINGS: Endogastric tube extends to the left upper quadrant of the abdomen. Artifacts project over the chest and upper abdomen. A left IJ central venous catheter has the tip projecting in the right atrial region. Patchy bilateral pulmonary infiltrates are present mostly in the mid to lower lung fields. This may represent bilateral pneumonia or perhaps patchy pulmonary edema. Correlate with clinical scenario. There may be minimal left pleural effusion. No right-sided effusion. No visible pneumothorax. Unremarkable cardiomediastinal silhouette.  Atherosclerotic vascular calcifications noted. Bones are unremarkable for age. Normal upper abdomen.     Impression: Line and tube appear satisfactory. Patchy bilateral pulmonary infiltrates which may be pneumonia or pulmonary edema. Possible small left pleural effusion Workstation performed: [id number]       I have personally reviewed labs, imaging studies, and pertinent reports.      This note has been generated by voice recognition software system.  Therefore, there may be spelling, grammar, and or syntax errors. Please contact if questions arise.      "

## 2024-04-01 NOTE — ASSESSMENT & PLAN NOTE
Primary team working up possible infectious source contributing to decreased wakefulness despite increase in provigil to 200mg QD

## 2024-04-01 NOTE — SPEECH THERAPY NOTE
Speech-Language Pathology Progress Note    Patient Name: Carla Hunt    Today's Date: 4/1/2024    Pt is back on the ventilator. Not currently appropriate for swallow therapy. Orders completed, please re consult when able/appropriate.

## 2024-04-01 NOTE — UTILIZATION REVIEW
Continued Stay Review    Date: 3/30, 3/31, 04/01/24                          Current Patient Class: IP  Current Level of Care: Critical Care    HPI:59 y.o. female initially admitted on 1/10/24.     Assessment/Plan:   3/30: Respiratory status worsening, concerns for pulmonary edema and decreased compliance. If patient does not improve with diuresis she may need positive pressure support.  Exam: fatigued, increased edema in extremities. Slow wean from solu-medrol, continue modafinil, neuro checks, vimpat, continue current meds, trach collar weaned as able, tube feeds w/ free water flushes, I&O, Trend labs, replete electrolytes as needed. Marie, continue filgastrim. Hold Lovenox until Plt > 50K. Transition to insulin gtt for greater BG control, q2h blood glucose checks. Wound VAC.     3/31: Some intermittent vaginal bleeding noted. Started on vent support this morning. On exam, trach, tachycardic, ostomy, tachypnea, lethargic, marie, basilar crackles, wound VAC. Plan: insulin gtt, q2h blood glucose checks. IV lasix. Check CXR, CT H, continue current meds, IV solu-medrol, Trend labs, replete electrolytes as needed. Tube feeds. Transfuse 1 unit pRBCs for Hgb 6.1.     4/1: Hematuria w/ clots in marie, requiring CBI initiation. Plt under 50K. Remains on vent support. On exam, tachycardic, ostomy, wound VAC, coarse breath sounds, opens eyes to voice and sternal rub, does not follow commands, clot in marie. Plan: transfuse 1 unit platelets, insulin gtt w/ q2h blood glucose checks, continue current meds, Trend labs, replete electrolytes as needed. Urology consulted.     Urology: Hematuria in marie. Urine dark punch colored. Manual irrigation w/ 300 mL revealed immediate clearing. Discontinue CBI, manual irrigations q4h w/ 240 mL NSS/sterile water. Maintain marie. Check CT AP.    Vital Signs:   Date/Time Temp Pulse Resp BP MAP (mmHg) SpO2 FiO2 (%) O2 Flow Rate (L/min) O2 Device   04/01/24 1200 99.5 °F (37.5 °C) 135 Abnormal   33 Abnormal  100/54 72 95 % -- -- Ventilator   04/01/24 1100 -- 137 Abnormal  33 Abnormal  94/55 71 96 % -- -- --   04/01/24 1020 98.4 °F (36.9 °C) 135 Abnormal  31 Abnormal  82/43 Abnormal  55 Abnormal  96 % -- -- --   04/01/24 1000 -- 136 Abnormal  30 Abnormal  90/53 69 98 % -- -- --   04/01/24 0925 98.2 °F (36.8 °C) -- -- 90/55 -- -- -- -- --   04/01/24 0914 97.9 °F (36.6 °C) 130 Abnormal  26 Abnormal  91/53 -- 93 % -- -- --   04/01/24 0907 -- 142 Abnormal  34 Abnormal  93/54 69 96 % -- -- --   04/01/24 0800 98.8 °F (37.1 °C) -- -- -- -- -- -- -- Ventilator   04/01/24 0600 -- 129 Abnormal  33 Abnormal  110/60 79 93 % -- -- --   04/01/24 0500 -- 129 Abnormal  32 Abnormal  109/57 77 94 % -- -- --   04/01/24 0403 -- -- -- -- -- -- 40 -- Ventilator   04/01/24 0400 98.3 °F (36.8 °C) 129 Abnormal  32 Abnormal  105/58 76 95 % 40 -- Ventilator   04/01/24 0200 -- 135 Abnormal  31 Abnormal  94/53 68 96 % -- -- --   04/01/24 0100 -- 133 Abnormal  31 Abnormal  103/59 73 95 % -- -- --   04/01/24 0000 99.4 °F (37.4 °C) 137 Abnormal  31 Abnormal  94/54 71 96 % -- -- --   03/31/24 2330 -- 134 Abnormal  31 Abnormal  106/53 75 95 % -- -- --   03/31/24 2300 -- 135 Abnormal  32 Abnormal  88/51 Abnormal  63 Abnormal  95 % -- -- --   03/31/24 2200 -- 132 Abnormal  33 Abnormal  95/55 71 95 % -- -- --   03/31/24 2000 98.9 °F (37.2 °C) 136 Abnormal  34 Abnormal  105/58 79 95 % -- -- --   03/31/24 1920 -- -- -- -- -- -- 40 -- Ventilator   03/31/24 1800 -- 134 Abnormal  37 Abnormal  111/60 77 94 % -- -- --   03/31/24 1600 99 °F (37.2 °C) 130 Abnormal  34 Abnormal  127/68 92 94 % -- -- Ventilator   03/31/24 1400 -- 131 Abnormal  36 Abnormal  126/66 89 95 % -- -- --   03/31/24 1200 98.3 °F (36.8 °C) 132 Abnormal  37 Abnormal  138/73 97 93 % -- -- Ventilator   03/31/24 1000 -- 135 Abnormal  38 Abnormal  140/79 104 97 % 50 -- Ventilator   03/31/24 0945 -- 133 Abnormal  38 Abnormal  129/77 -- 99 % -- -- --   03/31/24 0935 98.7 °F (37.1 °C) 125  Abnormal  36 Abnormal  141/75 -- 91 % -- -- --   03/31/24 0917 99 °F (37.2 °C) 138 Abnormal  29 Abnormal  125/68 -- -- -- -- --   03/31/24 0800 99.2 °F (37.3 °C) 135 Abnormal  29 Abnormal  124/64 85 100 % -- -- Trach mask   03/31/24 0746 -- -- -- -- -- -- 60 10 L/min Trach mask   03/31/24 0700 -- 133 Abnormal  31 Abnormal  126/66 90 100 % -- -- --   03/31/24 0600 -- 132 Abnormal  36 Abnormal  130/65 93 100 % -- -- --   03/31/24 0500 -- 130 Abnormal  33 Abnormal  131/65 91 100 % -- -- --   03/31/24 0400 98.2 °F (36.8 °C) 129 Abnormal  31 Abnormal  121/68 87 99 % -- -- Trach mask   03/31/24 0300 -- 128 Abnormal  32 Abnormal  140/69 96 100 % -- -- --   03/31/24 0200 -- 129 Abnormal  34 Abnormal  146/65 93 99 % -- -- --   03/31/24 0100 -- 125 Abnormal  32 Abnormal  134/68 95 100 % -- -- --   03/31/24 0000 99.2 °F (37.3 °C) 121 Abnormal  34 Abnormal  134/65 93 96 % -- -- --   03/30/24 2300 -- 122 Abnormal  34 Abnormal  128/67 93 97 % -- -- --   03/30/24 2200 -- 121 Abnormal  32 Abnormal  122/58 83 98 % -- -- --   03/30/24 2100 -- 126 Abnormal  39 Abnormal  121/57 82 92 % -- -- --   03/30/24 2014 -- -- -- -- -- -- 60 10 L/min Trach mask   03/30/24 2000 98 °F (36.7 °C) 124 Abnormal  35 Abnormal  134/73 98 94 % -- -- --   03/30/24 1900 -- 126 Abnormal  35 Abnormal  132/72 96 95 % -- -- --   03/30/24 1800 -- 124 Abnormal  36 Abnormal  138/63 90 94 % -- -- --   03/30/24 1600 98.4 °F (36.9 °C) 128 Abnormal  36 Abnormal  128/64 90 94 % -- -- Trach mask   03/30/24 1436 97.6 °F (36.4 °C) 135 Abnormal  39 Abnormal  114/59 -- -- -- -- --   03/30/24 1430 98.3 °F (36.8 °C) 133 Abnormal  41 Abnormal  117/60 83 96 % -- -- --   03/30/24 1408 98.6 °F (37 °C) 136 Abnormal  33 Abnormal  125/72 -- -- -- -- --   03/30/24 1200 -- 134 Abnormal  39 Abnormal  122/71 91 91 % -- -- --   03/30/24 1000 -- 132 Abnormal  35 Abnormal  128/72 94 95 % -- -- --   03/30/24 0817 -- -- -- -- -- 95 % 60 10 L/min Trach mask   03/30/24 0800 98.9 °F (37.2 °C)  133 Abnormal  37 Abnormal  144/77 101 90 % -- -- Trach mask   03/30/24 0600 -- 131 Abnormal  35 Abnormal  145/72 98 91 % -- -- --   03/30/24 0500 -- 134 Abnormal  39 Abnormal  132/77 96 89 % Abnormal  -- -- --   03/30/24 0400 97.2 °F (36.2 °C) Abnormal  137 Abnormal  36 Abnormal  154/82 108 93 % -- -- --   03/30/24 0300 -- 129 Abnormal  32 Abnormal  129/72 94 94 % -- -- --   03/30/24 0200 -- 133 Abnormal  36 Abnormal  131/72 95 95 % -- -- --   03/30/24 0100 -- 136 Abnormal  38 Abnormal  135/71 95 91 % -- -- --   03/30/24 0000 97.1 °F (36.2 °C) Abnormal  138 Abnormal  38 Abnormal  136/69 94 92 % -- -- --       Pertinent Labs/Diagnostic Results:   3/31- CXR  Persistent groundglass opacity with new consolidation in the right midlung.     3/31- CTH  1. No acute intracranial hemorrhage, mass effect or edema.  2. 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  3. 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 Differential diagnosis could include treatment related changes versus other etiologies..    3/31 - CT chest  Bilateral parenchymal infiltrative changes with consolidation like focus in the right upper lobe is new. Relative worsening.     4/1 - CXR  Focal consolidation in the right middle lung zone is approximately the same when compared to 3/31/2024, concerning for pneumonia.       Results from last 7 days   Lab Units 04/01/24  0430 03/31/24  1235 03/31/24  0545 03/31/24  0044 03/30/24  0454 03/29/24  0556 03/28/24  0532   WBC Thousand/uL 5.29  --  2.47* 2.18* 2.72* 1.15* 1.07*   HEMOGLOBIN g/dL 7.3* 7.7* 6.1* 6.5* 7.6* 7.6* 7.8*   HEMATOCRIT % 22.6* 24.1* 19.5* 20.6* 23.6* 23.9* 24.6*   PLATELETS Thousands/uL 36*  --  51* 50* 21* 22* 34*   TOTAL NEUT ABS Thousand/uL  --   --   --   --   --   --  0.88*   BANDS PCT %  --   --   --  28* 29* 39* 21*         Results from last 7 days   Lab Units 04/01/24  0430 03/31/24  0545  03/30/24  0454 03/29/24  1752 03/29/24  0556 03/28/24  0532   SODIUM mmol/L 143 141 141 139 137 145   POTASSIUM mmol/L 3.6 3.7 3.5 3.6 3.5 3.1*   CHLORIDE mmol/L 109* 108 112* 109* 110* 114*   CO2 mmol/L 21 21 18* 19* 18* 19*   ANION GAP mmol/L 13 12 11 11 9 12   BUN mg/dL 104* 78* 61* 52* 56* 62*   CREATININE mg/dL 1.24 0.96 0.70 0.65 0.69 0.83   EGFR ml/min/1.73sq m 47 65 95 98 96 77   CALCIUM mg/dL 10.6* 10.2 10.0 9.8 9.5 9.7   MAGNESIUM mg/dL 2.5 2.6 2.8*  --  1.7* 2.0   PHOSPHORUS mg/dL 5.5* 5.5* 4.2  --  3.2 3.9     Results from last 7 days   Lab Units 03/30/24  0454   AST U/L 12*   ALT U/L 45   ALK PHOS U/L 196*   TOTAL PROTEIN g/dL 4.9*   ALBUMIN g/dL 2.1*   TOTAL BILIRUBIN mg/dL 0.68     Results from last 7 days   Lab Units 04/01/24  1212 04/01/24  1011 04/01/24  0816 04/01/24  0552 04/01/24  0416 04/01/24  0208 04/01/24  0027 03/31/24  2213 03/31/24  1953 03/31/24  1809 03/31/24  1611 03/31/24  1352   POC GLUCOSE mg/dl 109 147* 145* 153* 133 121 117 203* 253* 195* 157* 110     Results from last 7 days   Lab Units 04/01/24  0430 03/31/24  0545 03/30/24  0454 03/29/24  1752 03/29/24  0556 03/28/24  0532 03/27/24  0545 03/26/24  2046 03/26/24  1438 03/26/24  0548 03/26/24  0026   GLUCOSE RANDOM mg/dL 131 105 147* 134 257* 201* 263* 179* 202* 179* 116       Results from last 7 days   Lab Units 04/01/24  0430 03/31/24  1235 03/31/24  0854   PH NICA  7.249* 7.305 7.238*   PCO2 NICA mm Hg 46.6 40.0* 52.5*   PO2 NICA mm Hg 47.6* 75.4* 67.7*   HCO3 NICA mmol/L 19.9* 19.5* 21.9*   BASE EXC NICA mmol/L -6.9 -6.4 -5.2   O2 CONTENT NICA ml/dL 9.7 11.1 8.6   O2 HGB, VENOUS % 78.6 93.8* 89.9*         Results from last 7 days   Lab Units 03/29/24  1752   CK TOTAL U/L 15*     Results from last 7 days   Lab Units 03/31/24  0545   PROCALCITONIN ng/ml 6.28*         Results from last 7 days   Lab Units 03/31/24  0545   CRP mg/L 331.2*       Results from last 7 days   Lab Units 03/29/24  1755   CLARITY UA  Turbid   COLOR UA  Light  Orange   SPEC GRAV UA  1.009   PH UA  6.0   GLUCOSE UA mg/dl Negative   KETONES UA mg/dl Negative   BLOOD UA  Large*   PROTEIN UA mg/dl 50 (1+)*   NITRITE UA  Negative   BILIRUBIN UA  Negative   UROBILINOGEN UA (BE) mg/dl <2.0   LEUKOCYTES UA  Small*   WBC UA /hpf Innumerable*   RBC UA /hpf Innumerable*   BACTERIA UA /hpf None Seen   EPITHELIAL CELLS WET PREP /hpf Occasional   MUCUS THREADS  Occasional*     Results from last 7 days   Lab Units 03/31/24  1450 03/31/24  1437 03/31/24  1046   BLOOD CULTURE  Received in Microbiology Lab. Culture in Progress. Received in Microbiology Lab. Culture in Progress.  --    SPUTUM CULTURE   --   --  Culture too young- will reincubate   GRAM STAIN RESULT   --   --  4+ Gram negative rods*  No polys seen*       Medications:   Scheduled Medications:  cefepime, 2,000 mg, Intravenous, Q12H  chlorhexidine, 15 mL, Mouth/Throat, Q12H SARTHAK  lacosamide, 100 mg, Oral, Q12H SARTHAK  levalbuterol, 1.25 mg, Nebulization, TID  methylPREDNISolone sodium succinate, 40 mg, Intravenous, Daily  modafinil, 200 mg, Per NG Tube, Daily  nystatin, , Topical, BID  pantoprazole, 40 mg, Intravenous, Q12H SARTHAK  polyethylene glycol, 17 g, Per NG Tube, Daily  sodium chloride, 2 spray, Each Nare, BID  sodium chloride, 4 mL, Nebulization, TID  sulfamethoxazole-trimethoprim, 1 tablet, Oral, Once per day on Monday Wednesday Friday  topiramate, 50 mg, Per PEG Tube, BID  vancomycin, 1,000 mg, Intravenous, Q24H  venlafaxine, 12.5 mg, Oral, TID With Meals    Continuous IV Infusions:  insulin regular (HumuLIN R,NovoLIN R) 1 Units/mL in sodium chloride 0.9 % 100 mL infusion, 0.3-21 Units/hr, Intravenous, Titrated    PRN Meds:  acetaminophen, 650 mg, Oral, Q6H PRN  fentaNYL, 25 mcg, Intravenous, Q1H PRN; 4/1 x1  furosemide, 40 mg, Intravenous; 3/30 x2  furosemide, 80 mg, Intravenous; 3/31 x1  multi-electrolyte, 500 mL, Intravenous, 4/1 x1  ondansetron, 4 mg, Intravenous, Q6H PRN  sodium chloride, 1 Application, Nasal, Q1H  PRN    Discontinued Meds:  ceFEPime (MAXIPIME) 2,000 mg in dextrose 5 % 50 mL IVPB  Dose: 2,000 mg  Freq: Every 8 hours Route: IV  Last Dose: Stopped (04/01/24 0615)  Start: 03/31/24 1315 End: 04/01/24 1123   Filgrastim-aafi (NIVESTYM) subcutaneous injection 300 mcg  Dose: 300 mcg  Freq: Daily Route: SC  Start: 03/30/24 0900 End: 03/31/24 1201   insulin glargine (LANTUS) subcutaneous injection 10 Units 0.1 mL  Dose: 10 Units  Freq: Every morning Route: SC  Start: 03/28/24 1145 End: 03/31/24 0831  vancomycin (VANCOCIN) 1,750 mg in sodium chloride 0.9 % 500 mL IVPB  Dose: 25 mg/kg  Weight Dosing Info: 70.4 kg  Freq: Once Route: IV  Last Dose: 1,750 mg (04/01/24 1029)  Start: 04/01/24 1030 End: 04/01/24 1229     Discharge Plan: TBD    Network Utilization Review Department  ATTENTION: Please call with any questions or concerns to [phone] and carefully listen to the prompts so that you are directed to the right person. All voicemails are confidential.   For Discharge needs, contact Care Management DC Support Team at [phone] opt. [phone]  Send all requests for admission clinical reviews, approved or denied determinations and any other requests to dedicated fax number below belonging to the campus where the patient is receiving treatment. List of dedicated fax numbers for the Facilities:  FACILITY NAME UR FAX NUMBER   ADMISSION DENIALS (Administrative/Medical Necessity) [phone]   DISCHARGE SUPPORT TEAM (NETWORK) [phone]   PARENT CHILD HEALTH (Maternity/NICU/Pediatrics) [phone]   [facility] [phone]   [facility] [phone]   [facility] [phone]   [facility] [phone]   [facility] [phone]   [facility] [phone]   [facility] [phone]   [facility] [phone]   [facility]  [facility] [phone]   [facility] [phone]   [facility] [phone]   [facility] [phone]

## 2024-04-01 NOTE — ASSESSMENT & PLAN NOTE
Code Status: full - Level 1  Ongoing medical optimization without limits at this time. Hopeful for slow recovery. Min does express understanding of periodic setbacks but is hoping Carla can build some momentum to become medically stable for d/c to rehab.   Min feels discouraged by setbacks over the last several days.

## 2024-04-01 NOTE — RESPIRATORY THERAPY NOTE
RT Ventilator Management Note  Carla Hunt 59 y.o. female MRN: [id number]  Unit/Bed#: [facility] 11 Encounter: [id number]      Daily Screen         3/31/2024  1025 4/1/2024  0723          Patient safety screen outcome:: Passed Failed (P)                 Physical Exam:   Assessment Type: Assess only  General Appearance: Sedated  Respiratory Pattern: Assisted, Tachypneic (Mild tachypnea)  Chest Assessment: Chest expansion symmetrical  Bilateral Breath Sounds: Rales, Coarse  Cough: None  Suction: Trach  O2 Device: G5      Resp Comments: (P) Pt received on APVcmv. Remains tachypneic on current settings. No changes at this time. RT will cont to monitor.     04/01/24 0723   Respiratory Assessment   Resp Comments Pt received on APVcmv. Remains tachypneic on current settings. No changes at this time. RT will cont to monitor.   Vent Information   Vent ID [id number]   Vent type Summers G5   Summers Vent Mode APVcmv   $ Pulse Oximetry Spot Check Charge Completed   APVcmv Settings   Resp Rate (BPM) 16 BPM   VT (mL) 450 mL   Insp Time (sec) 0.8 sec   FIO2 (%) 40 %   PEEP (cmH2O) 6 cmH2O   I:E Ratio 1/3.7   P-ramp (ms) 100 (ms)   Flow Trigger (LPM) 3 LPM   Humidification Heater   Heater Temp 95 °F (35 °C)   APVcmv Actuals   Resp Rate (BPM) 36 BPM   VT (mL) 464 mL   MV (Obs) 16.7   MAP (cmH2O) 14 cmH2O   Peak Pressure (cmH2O) 26 cmH2O   I:E Ratio (Obs) 1/1.8   Static Compliance (mL/cmH20) 20 mL/cmH2O   Plateau Pressure (cm H2O) 22 cm H2O   Heater Temperature (Obs) 95 °F (35 °C)   APVcmv ALARMS   High Peak Pressure (cmH2O) 50 cmH2O   Low Peak Pressure (cmH2O) 5 cm H2O   High Resp Rate (BPM) 45 BPM   High MV (L/min) 30 L/min   Low MV (L/min) 3 L/min   High VT (mL) 1000 mL   Low VT (mL) 250 mL   Apnea Time (s) 20 S   Maintenance   Alarm (pink) cable attached No   Resuscitation bag with peep valve at bedside Yes   Water bag changed No   Circuit changed No   Daily Screen   Patient safety screen outcome: Failed   IHI Ventilator  Associated Pneumonia Bundle   Daily Assessment of Readiness to Extubate Yes   Surgical Airway Shiley Cuffed   Placement Date/Time: 03/12/24 1200   Tube Size: 8 mm  Type: Tracheostomy  Brand: Naveen  Style: Cuffed   Status Cuff Inflated;Secured   Surgical Airway Cuff Pressure (color) Green   Equipment at bedside BVM;Wall Suction setup;Additional complete same size trach tube;Additional complete one size smaller trach tube;Obturator;Sterile saline;Additional inner cannula

## 2024-04-01 NOTE — PROGRESS NOTES
[facility]  Progress Note: Critical Care  Name: Carla Hunt 59 y.o. female I MRN: [id number]  Unit/Bed#: MICU 11 I Date of Admission: 1/10/2024   Date of Service: 4/1/2024 I Hospital Day: 82    Assessment/Plan   Acute hypoxic respiratory failure; s/p tracheostomy 3/12  Bilateral ground glass opacities, persistent, improving  Pancytopenia- multifactorial including hx b-cell lymphoma, persistent inflammation  Acute on chronic anemia- multifactorial-intermittent blood loss from ostomy site, chronic inflammation, iatrogenic, marrow dysfunction in setting of persistent inflammation   Lymphopenia with bandemia, in setting of high dose corticosteroids  Severe Metabolic encephalopathy, multifactorial including ?uremia, improving  Uremia, ?catabolic from steroids, improving  Oropharyngeal dysphagia, NPO on TF, in setting of multiple prolonged intubations  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; complicated by poor wound healing  Wound dehiscence 2/2 poor wound healing s/p wound vac 3/13  B-Cell lymphoma, s/p chemotheraphy 2022, Rituxan maintenance therapy on hold 2/2 persistent covid-19  Seizure disorder; on vimpat  Steroid induced hyperglycemia;on insulin gtt  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  Hx of complex migraines s/p L temporal lobectomy 2007 complicated by #18  Hx of complex seizures in setting of #17, on AEDs  COVID-19 infection POA; resolved; s/p multiple courses of antivirals 2/2 persistent infection,  most recent Remdesivir course completed 3/15, superimposed by recurrent PNA s/p multiple courses Abx  Stenotrophomonas PNA; recurrent, previously on Bactrim, switched to minocycline 2/2 ELIESER now resolved, completed 14d course 3/27  Minimal SAH POA, no interventions required, resolved on repeat CTH     Neuro:    Diagnosis: Alertness  -Continue modafinil 200mg qd delerium precautions  Diagnosis: Analgesia  - PRN Fentanyl 25mcg/hr;  on holiday for frequent neurochecks     Diagnosis: Metabolic encephalopathy; POA, improving  -Waxing and waning neuro exam since admission  -Most recent repeat CTH 3/13 negative for acute intracranial findings.  Has had extensive neurological workup including MRI/CT neuroimaging, LP with unremarkable findings  -Now remains off sedation. Electrolytes, sodium, Hyperglycemia 2/2 high dose steroids requiring insulin gtt. Ammonia normal. May be prolonged 2/2 PNA, possibly worsened by uremic encephalopathy worsened by ELIESER now resolved, uremia improving; candida growth seen on BAL Cx from 3/11, may be respiraotry colonization, however currently without signs of systemic fungal infection but is at high risk 2/2 lymphopenia, depressed immunoglobulins in setting of B cell lymphoma and high dose corticosteroids.   Plan:  -Fu stat CTH  -Fu VBG, CXR, CRP due to change in mental status  -Slow steroid wean: currently IV Solumedrol 40mg BID 7d course  -S/P 14d Remdesivir course to tx COVID-19, completed 3/15  -S/P 14d Minocycline course to tx stenotrophomonas PNA, completed 3/27   -Continue modafinil as above  -Avoid PRN fentanyl/sedative meds as able.  -Delirium precautions  -Frequent neurochecks  -Defer MRI brain due to clinical improvement, not likely to      Diagnosis: seizure disorder, known history  -S/p partial left temporal lobe resection in 2007 2/2 complex migraines  -On Lamictal 150 bid and Topamax 50mg bid at home  -Last lamotrigine level from 03/02 at 10.7 (therapeutic)  -Home Lamictal switched to Vimpat 3/27 due to worsening pancytopenia, neuro following  -Continue Vimpat 100mg bid maintenance dose  -Continue home topiramate 50mg bid  -Seizure precautions      Diagnosis: Anxiety, known history  -On Effexor 12.5 mg TID     CV:  -Diagnosis: Sinus tachycardia  -TTE 3/17 with no major structural dysfunction  -Multifactorial 2/2 deconditioning, dehydration/hypvol, acute on chronic anemia, underlying  infectious/inflammatory process, pain, Modafinal-induced  Plan:  - See plans under Pulm, Heme/Onc, ID, MSK    Pulm:  Diagnosis: Acute hypoxic respiratory failure;  Diagnosis: Bilateral ground glass opacities, improving  -Vent dependent; s/p trach 3/12 after was reintubated 3/11 due to increased WOB  -Persistently COVID+ since admission s/p multiple courses of antivirals (most recent completed 3/15), complicated by recurrent bacterial PNA treated w 10d levaquin (completed 2/25) for MDR PNA; most recent BAL +recurrent stenotrophomona treated with Bactrim, swithced to minocyline 14d course completed 3/28.  -Etiology Likely multifactorial including possible COVID pneumonitis vs recurrent PNA vs hypersensitivity pneumonitis 2/2 ?rituxumab. Persistent inflammation likely given patient has been steroid responsive throughout admission.   -CRP increasing but likely from intraabdominal inflammation as patient is noted to have clinical improvement with weaning of steroids, ABx.   -Repeat CXR 3/22 with significant improvement of multifocal PNA. Repeat COVID ag negative x2 3/27  -Follow up anti-Rituximab ab  Plan:  -S/P 14d Remdesivir course to tx COVID-19, completed 3/15  -S/P 14d Minocycline course to tx stenotrophomonas PNA, completed 3/27   -IV diuresis on 3/30, UO 2.2L; f/u CXR today  -Steroid wean 40mg qd x7d, decrease by 10mg qd dosing x7d  -Hold off on starting empiric antifungal given clinical improvement  -Continue trach collar, wean O2 as able     GI:  Diagnosis: Partial cecal volvulus s/p right hemicolectomy with ostomy pouch in place  -Complicated by poor wound healing of midline incision as evidenced by wound dehiscence s/p wound vac that was removed 3/17 by gen surg.  -Stoma with dark brown output, consistent with prior  Plan:  -Wound vac as per general surgery  -Monitor ostomy pouch output  -Surgery following, will keep them updated if any further changes     Diagnosis: oropharyngeal dysphagia   -Has had multiple  and prolonged intubations now s/p trach   -VBS 3/27 with oropharyngeal dysphagia  -Speech therapy to begin neuromuscular electrical stim/NMES/VitalStim,   -Continue tube feeds for now until PEG placement pending abdominal wound healing as per Gen Surg    :  Diagnosis: Hematuria and vaginal bleeding  - clots per 3 way marie  - Urology consulted : recommend manual irrigation q4h 240 cc NS/water instead of CBI  - Gyn consulted 3/31 - pending     Diagnosis: Hypernatremia, resolved with D5  Sodium 140 today (corrected for )  -Hold D5W   -Free water flushes to 300cc q4h     Diagnosis: uremia, improving  -?catabolic from steroids, may also have ?slow UGIB in setting of prolonged steroids though no overt s/s of GIB and H&H stable since 3/23 and patient is on ppi  -Trending down  -Will consider GI consultation for potential EGD inpatient if clinical s/s UGIB     Diagnosis: CKD III,   -Baseline Cr  0.8-1.2  -Cr now stable and at baseline.   -UO adequate, on Marie, draining clear yellow urine  -Prerenal azotemia 2/2 uremia,   Plan:  -Trend daily BMP  -Continue to monitor I/O and UOP  -Avoid nephrotoxins, contrast dye as able  -See plan under uremia     F/E/N:  F: none  E: monitor and replete for goal K>4, P>3, Mg>2  N: tube feeds with vital 1.5; 45 cc/h, Prosource liquid protein to 1 packet BID, Refugio BID to support wound healing       Heme/Onc:  Diagnosis: Pancytopenia  -In setting of hx of B cell lymphoma, prior chemo, Rituxan therapy, prior abx and present meds including lamictal  -Hemo onc consulted, empirically given Filgastrim 300mg qd x3d (completed 3/30); IR bone marrow bx deferred by heme-onc   -Lamictal switched to Vimpat in consultation with neuro as above due to potential contribution to pancytopenia  -Trend CBC and diff daily, neutropenic precautions for ANC <500    Diagnosis: Acute on chronic anemia  -Baseline Hgb ~7-8. S/P 2U PRBCs 3/17 after repeat Hgb 5.3, total of 6U transfuse  d since  admission.  -Patient had significant blood loss from ostomy site 3/20, resulting in hemorraghic shock, improved with blood transfusion; hemostasis achieved after bleeding source identified and sutured. Another large vol danie bloody output from osotomy on 3/21, bleeding source within ostomy site, sutured.    -Also having intermittent vaginal bleeding  -May still have slow GIB due to persistent uremia, GI previously consulted for EGD but was deferred due to hemodynamic instability at the time  -?Worsened by impaired marrow response liekly 2/2 persistent inflammation due to low retic index ~1 prior to most recent transfusions; normocytic with normal B12/folate levels; MCV<100. Iatrogenic cause likely contributing 2/2 frequent blood draws; chronic anemia likely persistent inflammatory state/CKD/lymphoma in setting of elevated ferritin of 974. SPEP/UPEP negative. Hemolysis workup negative.   Plan:  -s/p 1 U pRBC on 3/31  -Routine monitoring ostomy output, if bleeding, apply direct pressure and contact gen surg STAT for hemostasis .  -Continue tube feeds/nutritional support  -Trend CBC, transfuse Transfuse with leukoreduced and irradiated RBCs to maintain Hgb>7  -See plan under pancytopenia     Diagnosis: Thrombocytopenia  -Likely multifactorial including imparied production from marrow dysfunction in setting of persistent inflammation; RI low suggestive of hyperproliferation, hx of B-cell lymphoma, recent infections including persistent COVID, PNA treated, CMV negative. No known history of vWD/congenital disorders. No liver dysfunction; recent hepatic profile unremarkable. HIT workup negative, Hemolytic workup negative. No s/s DIC. No mechanical valves. ?Primary ITP.  Plan:  -Filgrastim 300mg x3 to aid in plt recovery  -Transfuse with leukoreduced and irradiated PLTs if count <20k due to increased risk of bleeding   -Goal >50k  -Hold Lovenox for DVT ppx, resume once repeat H&H>7  - Transfuse at higher threshold less  than 50 if evidence of bleeding.    - transfuse 1 U plt 4/1  -See plan under pancytopenia      Diagnosis Lymphopenia with bandemia  -In setting of high dose steroids  -Severely depressed immunoglobulins inclding IgG, IgA, IgM  -At risk for further infections  -Filgrastim 300mg x3 to aid in plt recovery  -Contact and airborne precautions    Diagnosis: B cell lymphoma  -[facility]  -S/P 6 cycles of chemotherapy in 20222  -Maintained on Rituxan for 2 year course PTA, started May 2022, last dose was 12/2024, treatment currently on hold in setting of persistent COVID-19 infection  Anti-Ritux Ab negative    Plan:  -Holding rituximab in setting of persistent COVID-19 infection, now resolved.  ?resume rituxubmab pending clinical stability & anti-ritux ab status in consultation with heme-onc     DVT ppx: hold lovenox 2/2 thrombocytopenia     Endo:  Diagnosis: steroid hyperglycemia and diabetes mellitus type 2, A1c at 6.6 from 01/2024  -Goal -180  -Insulin gtt     ID:  Diagnosis: Recurrent bacterial pneumonia  - Patient has completed multiple treatment courses of remdesivir throughout hospitalization in addition to 7 days of ceftriaxone.  - BAL cultures in 2/2024 positive for MDR Pseudomonas and stenotrophomonas treated with 10d course of Levaquin  - CT C/A/P 3/10 with persistent groundglass opacities and changes suggestive of sequelae of COVID, prior infections.  Completed 10d course of cefepime for broad spectrum coveragge (completed (3/13)  -Repeat BAL cultures from 3/11 showing 4+ growth Stenotrophomonas maltophilia. Could be colonization given prior BAL growth of same, however due to recent intubation with prolonged corticosteroid theraphy, will begin treating as true pathogen. Patient otherwise remains afebrile, no leukocytosis. CRP with down trending.  Previously on Bactrim from 3/13 to 3/18, discontinued due to worsening ELIESER  Plan:  -S/P 14d Minocycline course to tx stenotrophomonas PNA,  completed 3/27   -IV steroids as above     MSK/Skin:  Diagnosis: Midline incision wound with poor wound healing-  -General surgery performed staple removal of the patient's midline incision on 3/12 with some skin signs appreciated at the inferior aspect of the wound without obvious pus drainage. Overnight 3/13, wound dehiscence progressed requiring general surgery reevaluation. Red/brown aspirate noted, fascia intact. Wet-to-dry dressings in place. General surgery following for next Epson wound care.  -Poor wound healing in setting of high-dose steroid therapy.  No  exam no obvious signs of infection at this time.   -S/P wound vac replacement 3/13 as per gen surgery. No active concerns for cellulitis.  Plan:  -Wound VAC management as per general surgery  -Wound care for peritracheostomy wound  -Abdominal wound care as per general surgery  -Routine monitoring     Diagnosis: Critical illness myopathy  -Likely exacerbated by high-dose steroid therapy  Plan:  -Continue to wean steroids as above  -Aggressive PT/OT         Disposition: Critical care    ICU Core Measures     Vented Patient  VAP Bundle  VAP bundle ordered     A: Assess, Prevent, and Manage Pain Has pain been assessed? Yes  Need for changes to pain regimen? NA   B: Both Spontaneous Awakening Trials (SATs) and Spontaneous Breathing Trials (SBTs) Plan to perform spontaneous awakening trial today? N/A   Plan to perform spontaneous breathing trial today? N/A   Obvious barriers to extubation? NA   C: Choice of Sedation RASS Goal: N/A patient not on sedation  Need for changes to sedation or analgesia regimen? NA   D: Delirium CAM-ICU: Negative   E: Early Mobility  Plan for early mobility? Yes   F: Family Engagement Plan for family engagement today? Yes       Antibiotic Review: Patient on appropriate coverage based on culture data.  and Infectious disease consulted    Review of Invasive Devices:    Ortiz Plan: voiding trial today        Prophylaxis:  VTE  Contraindicated secondary to: Plt <50   Stress Ulcer  covered bypantoprazole (PROTONIX) injection 40 mg [[id number]]        Significant 24hr Events     Recent updates: COVID-neg 3/27, completed antivirals. Started Bactrim for pcp ppx 3/29 due to prolonged steroids. Steroid wean further slowed to 40mg daily x7d followed by 25% decrease next week. On insulin gtt. Completed minocycline course for stenotrop PNA 3/27. AntiRitux negative. Pancytopenia and ANC were worsening, hem-onc was consulted -> Given Filgastrim 300mg x3d (BM bx deferred by [facility]). Lamictal was d/c'd last week by neuro (due to pancyto) --> on vimpat instead as per neuro. PLTs trending down despite PLT transfusions. HIT snyder negative. VBS failed, to start neuromusc elect stim therapy per speech. Modafinil was increased to 200mg by palliative for alertness. Gen surg changing wound vac. Was overall doing well with improved CXR and taking part with PT/OT until  3/29-3/30-  became more tachypnea, tired, less alert. Intermittent vaginal bleeding. CK neg.    3/30 overnight: S/P 1U prbc 3/31 for Hgb 6's-->7.7. Having intermittent vaginal bleeding still. Ogyn consulted. No melenic stools.   3/31- Clinically worsening (less alert). +Secretions. Back on vent. CTH negative. CXR worsening. CT chest with new consolidation RUL. Blood Cx, sputum Cx taken. Procal pending . CRP 300s. Emierically started cefepime 2g q8h for HAP. Getting diuresed.    Overnight: Vaginal bleeding and blood clots from marie requiring CBI. CT AP ordered for clot burden assessment and urology consulted. S/p 1 U pRBC yesterday.     Transfuse 1 U plt for plt<50K + hematuria.     On Vent APVcmv 16/450/6/40% since yesterday morning.      Subjective     Review of Systems   Unable to perform ROS: Acuity of condition        Objective                            Vitals I/O      Most Recent Min/Max in 24hrs   Temp 98.3 °F (36.8 °C) Temp  Min: 98.3 °F (36.8 °C)  Max: 99.4 °F (37.4 °C)   Pulse (!) 129  Pulse  Min: 125  Max: 138   Resp (!) 33 Resp  Min: 29  Max: 38   /60 BP  Min: 88/51  Max: 141/75   O2 Sat 93 % SpO2  Min: 91 %  Max: 100 %     LDA  Peripherals (R, L)    Ileostomy RUQ 36d, draining bilious o/p  Wound vac  NGT   Marie  Surgical airway , cuffed   Intake/Output Summary (Last 24 hours) at 4/1/2024 0658  Last data filed at 4/1/2024 0615  Gross per 24 hour   Intake 2528.9 ml   Output 2105 ml   Net 423.9 ml       Diet Enteral/Parenteral; Tube Feeding No Oral Diet; Vital 1.5; Continuous; 45; Prosource Protein Liquid - One Packet; OD; Refugio - One Packet; BID; 50; Water; Every 4 hours    Invasive Monitoring           Physical Exam   Physical Exam  Eyes:      Extraocular Movements: Extraocular movements intact.      Conjunctiva/sclera: Conjunctivae normal.   HENT:      Mouth/Throat:      Mouth: Mucous membranes are moist.   Neck:      Trachea: Tracheostomy present.   Cardiovascular:      Rate and Rhythm: Regular rhythm. Tachycardia present.   Musculoskeletal:      Right lower leg: No edema.      Left lower leg: No edema.   Abdominal:      Palpations: Abdomen is soft.      Tenderness: There is no abdominal tenderness.      Comments: Ostomy with brown stool, wound vac with maroon output     Constitutional:       Appearance: She is ill-appearing.      Interventions: She is not intubated.  Pulmonary:      Effort: She is not intubated.      Comments: Coarse breath sounds b/l  Neurological:      Comments: Opens eyes to vocal/sternal rub stimuli, but does not follow commands this morning.    Genitourinary/Anorectal:     Comments: Clot from marie  No visible vaginal bleeding at this time  Marie present.          Diagnostic Studies      EKG: no new  Imaging: No new; CT chest 3/31 Bilateral parenchymal infiltrative changes lower lobes bilaterally posteriorly with consolidation like focus in the right upper lobe is new. Relative worsening.    I have personally reviewed pertinent reports.        Medications:  Scheduled PRN   cefepime, 2,000 mg, Q8H  chlorhexidine, 15 mL, Q12H SARTHAK  furosemide, 80 mg, BID (diuretic)  lacosamide, 100 mg, Q12H SARTHAK  levalbuterol, 1.25 mg, TID  methylPREDNISolone sodium succinate, 40 mg, Daily  modafinil, 200 mg, Daily  nystatin, , BID  pantoprazole, 40 mg, Q12H SARTHAK  polyethylene glycol, 17 g, Daily  sodium chloride, 2 spray, BID  sodium chloride, 4 mL, TID  sulfamethoxazole-trimethoprim, 1 tablet, Once per day on Monday Wednesday Friday  topiramate, 50 mg, BID  venlafaxine, 12.5 mg, TID With Meals      acetaminophen, 650 mg, Q6H PRN  fentaNYL, 25 mcg, Q1H PRN  ondansetron, 4 mg, Q6H PRN  sodium chloride, 1 Application, Q1H PRN       Continuous    insulin regular (HumuLIN R,NovoLIN R) 1 Units/mL in sodium chloride 0.9 % 100 mL infusion, 0.3-21 Units/hr, Last Rate: 6 Units/hr (04/01/24 0615)           Labs:    CBC    Recent Labs     03/31/24  0044 03/31/24  0545 03/31/24  1235 04/01/24  0430   WBC 2.18* 2.47*  --  5.29   HGB 6.5* 6.1* 7.7* 7.3*   HCT 20.6* 19.5* 24.1* 22.6*   PLT 50* 51*  --  36*   BANDSPCT 28*  --   --   --      BMP    Recent Labs     03/31/24  0545 04/01/24  0430   SODIUM 141 143   K 3.7 3.6    109*   CO2 21 21   AGAP 12 13   BUN 78* 104*   CREATININE 0.96 1.24   CALCIUM 10.2 10.6*       Coags    No recent results     Additional Electrolytes  Recent Labs     03/31/24  0545 04/01/24  0430   MG 2.6 2.5   PHOS 5.5* 5.5*          Blood Gas    No recent results  Recent Labs     04/01/24  0430   PHVEN 7.249*   [id number] 46.6   PO2VEN 47.6*   [id number] 19.9*   BEVEN -6.9   [id number] 78.6    LFTs  No recent results        Infectious  Recent Labs     03/31/24  0545   PROCALCITONI 6.28*        COVID ag 3/25 negative  COVID ag 3/27 negative    BAL 3/11   --4+ steno malto., 2+ candida  --Viral Cx neg  --Fungal 4+ candida  CMV neg  PCP smear neg  COVID pcr + on 3/11    3/31 blood culture pending   3/31 trach aspirate 4+ GNR  Glucose  Recent Labs     03/31/24  0545  04/01/24  0430   GLUC 105 131               Emilie Soyeon Kim, MD

## 2024-04-01 NOTE — PROGRESS NOTES
[facility]  Progress Note  Name: Carla Hunt I  MRN: [id number]  Unit/Bed#: MICU 10 I Date of Admission: 1/10/2024   Date of Service: 4/1/2024 I Hospital Day: 82    Assessment/Plan   Goals of care, counseling/discussion  Assessment & Plan  Code Status: full - Level 1  Ongoing medical optimization without limits at this time. Hopeful for slow recovery. Min does express understanding of periodic setbacks but is hoping Carla can build some momentum to become medically stable for d/c to rehab.   Min feels discouraged by setbacks over the last several days.     Palliative care patient  Assessment & Plan  Time spent providing supportive listening to spouse Min at bedside. Extended offer of support to patient's children.  Min continues to be consistently present and invested in patient's care.   Also supported by her daughter, son, and robust  group of friends, brother, and [place] community  Decisional apparatus:  Patient is not competent on my exam today.  If competence is lost, patient's substitute decision maker would default to spouse Min by [place] Act 169.  Advance Directive / Living Will / POLST:  None on file, unable to complete    Teto marginal zone B-cell lymphoma (HCC)  Assessment & Plan  Previously on maintenance Rituxan therapy. Last treatment in December 2023  Bone marrow bx deferred at this time per onc    Encephalopathy  Assessment & Plan  Primary team working up possible infectious source contributing to decreased wakefulness despite increase in provigil to 200mg QD    * Acute respiratory failure with hypoxia (HCC)  Assessment & Plan  Patient was re-intubated 3/11 with subsequent bedside trach on 3/12.   Very careful steroid wean  per critical care team.   Patient back on ventilator support today, previously on trach collar and using PMV         Interval history:       Patient with decreased wakefulness and new onset hematuria. Patient undergoing  infectious work-up. She is also back on ventilator and started on IV cefepime and vancomycin for new consolidation in right upper lobe. CT head without new findings. ID following and heme onc re-engaged for acute pancytopenia.    MEDICATIONS / ALLERGIES:     all current active meds have been reviewed    No Known Allergies    OBJECTIVE:    Physical Exam  Physical Exam  Constitutional:       Appearance: She is ill-appearing.   HENT:      Head: Atraumatic.   Eyes:      Conjunctiva/sclera: Conjunctivae normal.   Cardiovascular:      Rate and Rhythm: Tachycardia present.   Pulmonary:      Comments: Ventilated via trach  Abdominal:      Comments: Wound visualized via media tab/surgery note   Skin:     General: Skin is warm and dry.   Neurological:      Comments: Less responsive, not following commands or opening eyes to voice or touch   Psychiatric:      Comments: calm         Lab Results:   Results from last 7 days   Lab Units 04/01/24  0430 03/31/24  1235 03/31/24  0545 03/31/24  0044   WBC Thousand/uL 5.29  --  2.47* 2.18*   HEMOGLOBIN g/dL 7.3* 7.7* 6.1* 6.5*   HEMATOCRIT % 22.6* 24.1* 19.5* 20.6*   PLATELETS Thousands/uL 36*  --  51* 50*   MONO PCT % 6  --  9 5   EOS PCT % 0  --  1 0     Results from last 7 days   Lab Units 04/01/24  0430 03/31/24  0545 03/30/24  0454   POTASSIUM mmol/L 3.6 3.7 3.5   CHLORIDE mmol/L 109* 108 112*   CO2 mmol/L 21 21 18*   BUN mg/dL 104* 78* 61*   CREATININE mg/dL 1.24 0.96 0.70   CALCIUM mg/dL 10.6* 10.2 10.0   ALK PHOS U/L  --   --  196*   ALT U/L  --   --  45   AST U/L  --   --  12*       Imaging Studies: reviewed pertinent studies   EKG, Pathology, and Other Studies: reviewed pertinent studies    Counseling / Coordination of Care    Total floor / unit time spent today 25 minutes. Greater than 50% of total time was spent with the patient and / or family counseling and / or coordination of care. A description of the counseling / coordination of care: time spent assessing patient,  communicating with RN, primary team, providing support.

## 2024-04-01 NOTE — PLAN OF CARE
Problem: Prexisting or High Potential for Compromised Skin Integrity  Goal: Skin integrity is maintained or improved  Description: INTERVENTIONS:  - Identify patients at risk for skin breakdown  - Assess and monitor skin integrity  - Assess and monitor nutrition and hydration status  - Monitor labs   - Assess for incontinence   - Turn and reposition patient  - Assist with mobility/ambulation  - Relieve pressure over bony prominences  - Avoid friction and shearing  - Provide appropriate hygiene as needed including keeping skin clean and dry  - Evaluate need for skin moisturizer/barrier cream  - Collaborate with interdisciplinary team   - Patient/family teaching  - Consider wound care consult   Outcome: Progressing     Problem: Nutrition/Hydration-ADULT  Goal: Nutrient/Hydration intake appropriate for improving, restoring or maintaining nutritional needs  Description: Monitor and assess patient's nutrition/hydration status for malnutrition. Collaborate with interdisciplinary team and initiate plan and interventions as ordered.  Monitor patient's weight and dietary intake as ordered or per policy. Utilize nutrition screening tool and intervene as necessary. Determine patient's food preferences and provide high-protein, high-caloric foods as appropriate.     INTERVENTIONS:  - Monitor oral intake, urinary output, labs, and treatment plans  - Assess nutrition and hydration status and recommend course of action  - Evaluate amount of meals eaten  - Assist patient with eating if necessary   - Allow adequate time for meals  - Recommend/ encourage appropriate diets, oral nutritional supplements, and vitamin/mineral supplements  - Order, calculate, and assess calorie counts as needed  - Recommend, monitor, and adjust tube feedings and TPN/PPN based on assessed needs  - Assess need for intravenous fluids  - Provide specific nutrition/hydration education as appropriate  - Include patient/family/caregiver in decisions related to  nutrition  Outcome: Progressing     Problem: INFECTION - ADULT  Goal: Absence or prevention of progression during hospitalization  Description: INTERVENTIONS:  - Assess and monitor for signs and symptoms of infection  - Monitor lab/diagnostic results  - Monitor all insertion sites, i.e. indwelling lines, tubes, and drains  - Monitor endotracheal if appropriate and nasal secretions for changes in amount and color  - [place] appropriate cooling/warming therapies per order  - Administer medications as ordered  - Instruct and encourage patient and family to use good hand hygiene technique  - Identify and instruct in appropriate isolation precautions for identified infection/condition  Outcome: Progressing     Problem: SAFETY ADULT  Goal: Patient will remain free of falls  Description: INTERVENTIONS:  - Educate patient/family on patient safety including physical limitations  - Instruct patient to call for assistance with activity   - Consult OT/PT to assist with strengthening/mobility   - Keep Call bell within reach  - Keep bed low and locked with side rails adjusted as appropriate  - Keep care items and personal belongings within reach  - Initiate and maintain comfort rounds  - Make Fall Risk Sign visible to staff  - Offer Toileting every 2 Hours, in advance of need  - Initiate/Maintain bed alarm  - Obtain necessary fall risk management equipment: non skid footwear  - Apply yellow socks and bracelet for high fall risk patients  - Consider moving patient to room near nurses station  Outcome: Progressing     Problem: RESPIRATORY - ADULT  Goal: Achieves optimal ventilation and oxygenation  Description: INTERVENTIONS:  - Assess for changes in respiratory status  - Assess for changes in mentation and behavior  - Position to facilitate oxygenation and minimize respiratory effort  - Oxygen administered by appropriate delivery if ordered  - Initiate smoking cessation education as indicated  - Encourage broncho-pulmonary  hygiene including cough, deep breathe, Incentive Spirometry  - Assess the need for suctioning and aspirate as needed  - Assess and instruct to report SOB or any respiratory difficulty  - Respiratory Therapy support as indicated  Outcome: Progressing     Problem: SKIN/TISSUE INTEGRITY - ADULT  Goal: Skin Integrity remains intact(Skin Breakdown Prevention)  Description: Assess:  -Perform Flavio assessment every shift   -Clean and moisturize skin every day   -Inspect skin when repositioning, toileting, and assisting with ADLS  -Assess under medical devices such as ronny  every hour   -Assess extremities for adequate circulation and sensation     Bed Management:  -Have minimal linens on bed & keep smooth, unwrinkled  -Change linens as needed when moist or perspiring  -Avoid sitting or lying in one position for more than 2 hours while in bed  -Keep HOB at 45 degrees     Toileting:  -Offer bedside commode  -Assess for incontinence every hour  -Use incontinent care products after each incontinent episode such as moisture barrier cream     Activity:  -Mobilize patient 3 times a day  -Encourage activity and walks on unit  -Encourage or provide ROM exercises   -Turn and reposition patient every 2 Hours  -Use appropriate equipment to lift or move patient in bed  -Instruct/ Assist with weight shifting every hour when out of bed in chair  -Consider limitation of chair time 2 hour intervals    Skin Care:  -Avoid use of baby powder, tape, friction and shearing, hot water or constrictive clothing  -Relieve pressure over bony prominences using allevyn   -Do not massage red bony areas    Next Steps:  -Teach patient strategies to minimize risks such as weight shifting    -Consider consults to  interdisciplinary teams such as PT  Outcome: Progressing  Goal: Incision(s), wounds(s) or drain site(s) healing without S/S of infection  Description: INTERVENTIONS  - Assess and document dressing, incision, wound bed, drain sites and  surrounding tissue  - Provide patient and family education  - Perform skin care/dressing changes  Outcome: Progressing  Goal: Pressure injury heals and does not worsen  Description: Interventions:  - Implement low air loss mattress or specialty surface (Criteria met)  - Apply silicone foam dressing  - Instruct/assist with weight shifting every 120 minutes when in chair   - Limit chair time to 2 hour intervals  - Use special pressure reducing interventions  when in chair   - Apply fecal or urinary incontinence containment device   - Perform passive or active ROM   - Turn and reposition patient & offload bony prominences every 2 hours   - Utilize friction reducing device or surface for transfers   - Consider consults to  interdisciplinary teams  - Use incontinent care products after each incontinent episode  - Consider nutrition services referral as needed  Outcome: Progressing     Problem: NEUROSENSORY - ADULT  Goal: Achieves stable or improved neurological status  Description: INTERVENTIONS  - Monitor and report changes in neurological status  - Monitor vital signs such as temperature, blood pressure, glucose, and any other labs ordered   - Initiate measures to prevent increased intracranial pressure  - Monitor for seizure activity and implement precautions if appropriate      Outcome: Progressing  Goal: Achieves maximal functionality and self care  Description: INTERVENTIONS  - Monitor swallowing and airway patency with patient fatigue and changes in neurological status  - Encourage and assist patient to increase activity and self care.   - Encourage visually impaired, hearing impaired and aphasic patients to use assistive/communication devices  Outcome: Progressing     Problem: CARDIOVASCULAR - ADULT  Goal: Maintains optimal cardiac output and hemodynamic stability  Description: INTERVENTIONS:  - Monitor I/O, vital signs and rhythm  - Monitor for S/S and trends of decreased cardiac output  - Administer and titrate  ordered vasoactive medications to optimize hemodynamic stability  - Assess quality of pulses, skin color and temperature  - Assess for signs of decreased coronary artery perfusion  - Instruct patient to report change in severity of symptoms  Outcome: Progressing  Goal: Absence of cardiac dysrhythmias or at baseline rhythm  Description: INTERVENTIONS:  - Continuous cardiac monitoring, vital signs, obtain 12 lead EKG if ordered  - Administer antiarrhythmic and heart rate control medications as ordered  - Monitor electrolytes and administer replacement therapy as ordered  Outcome: Progressing     Problem: GASTROINTESTINAL - ADULT  Goal: Minimal or absence of nausea and/or vomiting  Description: INTERVENTIONS:  - Administer IV fluids if ordered to ensure adequate hydration  - Maintain NPO status until nausea and vomiting are resolved  - Nasogastric tube if ordered  - Administer ordered antiemetic medications as needed  - Provide nonpharmacologic comfort measures as appropriate  - Advance diet as tolerated, if ordered  - Consider nutrition services referral to assist patient with adequate nutrition and appropriate food choices  Outcome: Progressing  Goal: Maintains or returns to baseline bowel function  Description: INTERVENTIONS:  - Assess bowel function  - Encourage oral fluids to ensure adequate hydration  - Administer IV fluids if ordered to ensure adequate hydration  - Administer ordered medications as needed  - Encourage mobilization and activity  - Consider nutritional services referral to assist patient with adequate nutrition and appropriate food choices  Outcome: Progressing  Goal: Maintains adequate nutritional intake  Description: INTERVENTIONS:  - Monitor percentage of each meal consumed  - Identify factors contributing to decreased intake, treat as appropriate  - Assist with meals as needed  - Monitor I&O, weight, and lab values if indicated  - Obtain nutrition services referral as needed  Outcome:  Progressing  Goal: Establish and maintain optimal ostomy function  Description: INTERVENTIONS:  - Assess bowel function  - Encourage oral fluids to ensure adequate hydration  - Administer IV fluids if ordered to ensure adequate hydration   - Administer ordered medications as needed  - Encourage mobilization and activity  - Nutrition services referral to assist patient with appropriate food choices  - Assess stoma site  - Consider wound care consult   Outcome: Progressing  Goal: Oral mucous membranes remain intact  Description: INTERVENTIONS  - Assess oral mucosa and hygiene practices  - Implement preventative oral hygiene regimen  - Implement oral medicated treatments as ordered  - Initiate Nutrition services referral as needed  Outcome: Progressing     Problem: GASTROINTESTINAL - ADULT  Goal: Maintains adequate nutritional intake  Description: INTERVENTIONS:  - Monitor percentage of each meal consumed  - Identify factors contributing to decreased intake, treat as appropriate  - Assist with meals as needed  - Monitor I&O, weight, and lab values if indicated  - Obtain nutrition services referral as needed  Outcome: Progressing     Problem: GENITOURINARY - ADULT  Goal: Maintains or returns to baseline urinary function  Description: INTERVENTIONS:  - Assess urinary function  - Encourage oral fluids to ensure adequate hydration if ordered  - Administer IV fluids as ordered to ensure adequate hydration  - Administer ordered medications as needed  - Offer frequent toileting  - Follow urinary retention protocol if ordered  Outcome: Progressing  Goal: Urinary catheter remains patent  Description: INTERVENTIONS:  - Assess patency of urinary catheter  - If patient has a chronic marie, consider changing catheter if non-functioning  - Follow guidelines for intermittent irrigation of non-functioning urinary catheter  Outcome: Progressing     Problem: METABOLIC, FLUID AND ELECTROLYTES - ADULT  Goal: Electrolytes maintained within  normal limits  Description: INTERVENTIONS:  - Monitor labs and assess patient for signs and symptoms of electrolyte imbalances  - Administer electrolyte replacement as ordered  - Monitor response to electrolyte replacements, including repeat lab results as appropriate  - Instruct patient on fluid and nutrition as appropriate  Outcome: Progressing  Goal: Fluid balance maintained  Description: INTERVENTIONS:  - Monitor labs   - Monitor I/O and WT  - Instruct patient on fluid and nutrition as appropriate  - Assess for signs & symptoms of volume excess or deficit  Outcome: Progressing  Goal: Glucose maintained within target range  Description: INTERVENTIONS:  - Monitor Blood Glucose as ordered  - Assess for signs and symptoms of hyperglycemia and hypoglycemia  - Administer ordered medications to maintain glucose within target range  - Assess nutritional intake and initiate nutrition service referral as needed  Outcome: Progressing     Problem: HEMATOLOGIC - ADULT  Goal: Maintains hematologic stability  Description: INTERVENTIONS  - Assess for signs and symptoms of bleeding or hemorrhage  - Monitor labs  - Administer supportive blood products/factors as ordered and appropriate  Outcome: Progressing     Problem: COPING  Goal: Pt/Family able to verbalize concerns and demonstrate effective coping strategies  Description: INTERVENTIONS:  - Assist patient/family to identify coping skills, available support systems and cultural and spiritual values  - Provide emotional support, including active listening and acknowledgement of concerns of patient and caregivers  - Reduce environmental stimuli, as able  - Provide patient education  - Assess for spiritual pain/suffering and initiate spiritual care, including notification of Pastoral Care or natalia based community as needed  - Assess effectiveness of coping strategies  Outcome: Progressing  Goal: Will report anxiety at manageable levels  Description: INTERVENTIONS:  - Administer  medication as ordered  - Teach and encourage coping skills  - Provide emotional support  - Assess patient/family for anxiety and ability to cope  Outcome: Progressing     Problem: Potential for Falls  Goal: Patient will remain free of falls  Description: INTERVENTIONS:  - Educate patient/family on patient safety including physical limitations  - Instruct patient to call for assistance with activity   - Consult OT/PT to assist with strengthening/mobility   - Keep Call bell within reach  - Keep bed low and locked with side rails adjusted as appropriate  - Keep care items and personal belongings within reach  - Initiate and maintain comfort rounds  - Make Fall Risk Sign visible to staff  - Offer Toileting every 2 Hours, in advance of need  - Initiate/Maintain bed alarm  - Obtain necessary fall risk management equipment: non skid footwear  - Apply yellow socks and bracelet for high fall risk patients  - Consider moving patient to room near nurses station  Outcome: Progressing     Problem: BEHAVIOR  Goal: Pt/Family maintain appropriate behavior and adhere to behavioral management agreement, if implemented  Description: INTERVENTIONS:  - Assess the family dynamic   - Encourage verbalization of thoughts and concerns in a socially appropriate manner  - Assess patient/family's coping skills and non-compliant behavior (including use of illegal substances).  - Utilize positive, consistent limit setting strategies supporting safety of patient, staff and others  - Initiate consult with Case Management, Spiritual Care or other ancillary services as appropriate  - If a patient's/visitor's behavior jeopardizes the safety of the patient, staff, or others, refer to organization procedure.   - Notify Security of behavior or suspected illegal substances which indicate the need for search of the patient and/or belongings  - Encourage participation in the decision making process about a behavioral management agreement; implement if  patient meets criteria  Outcome: Progressing     Problem: MOBILITY - ADULT  Goal: Maintain or return to baseline ADL function  Description: INTERVENTIONS:  -  Assess patient's ability to carry out ADLs; assess patient's baseline for ADL function and identify physical deficits which impact ability to perform ADLs (bathing, care of mouth/teeth, toileting, grooming, dressing, etc.)  - Assess/evaluate cause of self-care deficits   - Assess range of motion  - Assess patient's mobility; develop plan if impaired  - Assess patient's need for assistive devices and provide as appropriate  - Encourage maximum independence but intervene and supervise when necessary  - Involve family in performance of ADLs  - Assess for home care needs following discharge   - Consider OT consult to assist with ADL evaluation and planning for discharge  - Provide patient education as appropriate  Outcome: Progressing  Goal: Maintains/Returns to pre admission functional level  Description: INTERVENTIONS:  - Perform AM-PAC 6 Click Basic Mobility/ Daily Activity assessment daily.  - Set and communicate daily mobility goal to care team and patient/family/caregiver.   - Collaborate with rehabilitation services on mobility goals if consulted  - Perform Range of Motion 3 times a day.  - Reposition patient every 2 hours.  - Dangle patient 3 times a day  - Out of bed for toileting  - Record patient progress and toleration of activity level   Outcome: Progressing

## 2024-04-01 NOTE — PROCEDURES
Acute Care Surgery  Bedside V.A.C. Procedure Note    A timeout was performed with the patient's nurse, Donna, prior to beginning the dressing change. The nurse remained present to confirm the correct dressing counts on removal of the VAC dressing and was debriefed at the completion of the dressing change.    Location of wound: Midline abdomen.    Dressings and Foam removed:  1 adaptic  2 Black foam - 1 from wound bed. 1 bridge     Dimensions of wound: 10 cm x 4 cm x 4 cm    Description of wound: small amount of stool from prior wound (ostomy bag was replaced over weekend).     VAC dressing application:  The periwound skin was cleaned and dried.  One piece of oil emulsion gauze dressing was placed at the wound base followed by 1 piece of white foam to the side of the wound that communicates with the peristomal wound; 1 piece of black foam cut to size and placed into the wound. Fressings were then covered with VAC drape. Additional VAC drape and black foam was used to create a bridge to the patient's left lateral abdominal wall and a base for the track pad. The track pad was then placed over the base of black foam. The VAC was then set to 125 mmHg low Continuous suction. The patient tolerated the procedure well and there were no complications. No excisional debridement during today's dressing change.    Once the VAC dressing was in place and no leak was observed, a wound manager appliance was applied.    VAC settings:  125 mmHg  Continuous    Wound Images:    Additional Notes:  The VAC sticker placed over the dressing per protocol.  The next VAC dressing change will be planned for Wednesday 4/3.    This dressing change took 20 minutes to complete.    Jerardo Workman MD  4/1/2024 2:42 PM

## 2024-04-01 NOTE — ASSESSMENT & PLAN NOTE
Patient was re-intubated 3/11 with subsequent bedside trach on 3/12.   Very careful steroid wean  per critical care team.   Patient back on ventilator support today, previously on trach collar and using PMV

## 2024-04-02 PROBLEM — D61.818 PANCYTOPENIA (HCC): Status: ACTIVE | Noted: 2024-01-01

## 2024-04-02 PROBLEM — B49 FUNGEMIA: Status: ACTIVE | Noted: 2024-04-02

## 2024-04-02 PROBLEM — R31.0 GROSS HEMATURIA: Status: ACTIVE | Noted: 2024-04-02

## 2024-04-02 LAB
ABO GROUP BLD BPU: NORMAL
ANION GAP SERPL CALCULATED.3IONS-SCNC: 11 MMOL/L (ref 4–13)
ANISOCYTOSIS BLD QL SMEAR: PRESENT
ANISOCYTOSIS BLD QL SMEAR: PRESENT
ATRIAL RATE: 99 BPM
BASOPHILS # BLD MANUAL: 0 THOUSAND/UL (ref 0–0.1)
BASOPHILS # BLD MANUAL: 0 THOUSAND/UL (ref 0–0.1)
BASOPHILS NFR MAR MANUAL: 0 % (ref 0–1)
BASOPHILS NFR MAR MANUAL: 0 % (ref 0–1)
BPU ID: NORMAL
BUN SERPL-MCNC: 105 MG/DL (ref 5–25)
BURR CELLS BLD QL SMEAR: PRESENT
BURR CELLS BLD QL SMEAR: PRESENT
CALCIUM SERPL-MCNC: 10.3 MG/DL (ref 8.4–10.2)
CHLORIDE SERPL-SCNC: 110 MMOL/L (ref 96–108)
CO2 SERPL-SCNC: 20 MMOL/L (ref 21–32)
CREAT SERPL-MCNC: 1.44 MG/DL (ref 0.6–1.3)
CROSSMATCH: NORMAL
CRYPTOC AG SER QL: NEGATIVE
DACRYOCYTES BLD QL SMEAR: PRESENT
DACRYOCYTES BLD QL SMEAR: PRESENT
EOSINOPHIL # BLD MANUAL: 0 THOUSAND/UL (ref 0–0.4)
EOSINOPHIL # BLD MANUAL: 0 THOUSAND/UL (ref 0–0.4)
EOSINOPHIL NFR BLD MANUAL: 0 % (ref 0–6)
EOSINOPHIL NFR BLD MANUAL: 0 % (ref 0–6)
ERYTHROCYTE [DISTWIDTH] IN BLOOD BY AUTOMATED COUNT: 17.5 % (ref 11.6–15.1)
ERYTHROCYTE [DISTWIDTH] IN BLOOD BY AUTOMATED COUNT: 18.1 % (ref 11.6–15.1)
ERYTHROCYTE [DISTWIDTH] IN BLOOD BY AUTOMATED COUNT: 18.3 % (ref 11.6–15.1)
ERYTHROCYTE [DISTWIDTH] IN BLOOD BY AUTOMATED COUNT: 18.4 % (ref 11.6–15.1)
GFR SERPL CREATININE-BSD FRML MDRD: 40 ML/MIN/1.73SQ M
GLUCOSE SERPL-MCNC: 115 MG/DL (ref 65–140)
GLUCOSE SERPL-MCNC: 118 MG/DL (ref 65–140)
GLUCOSE SERPL-MCNC: 119 MG/DL (ref 65–140)
GLUCOSE SERPL-MCNC: 119 MG/DL (ref 65–140)
GLUCOSE SERPL-MCNC: 125 MG/DL (ref 65–140)
GLUCOSE SERPL-MCNC: 128 MG/DL (ref 65–140)
GLUCOSE SERPL-MCNC: 133 MG/DL (ref 65–140)
GLUCOSE SERPL-MCNC: 135 MG/DL (ref 65–140)
GLUCOSE SERPL-MCNC: 136 MG/DL (ref 65–140)
GLUCOSE SERPL-MCNC: 140 MG/DL (ref 65–140)
GLUCOSE SERPL-MCNC: 143 MG/DL (ref 65–140)
GLUCOSE SERPL-MCNC: 145 MG/DL (ref 65–140)
HCT VFR BLD AUTO: 20.4 % (ref 34.8–46.1)
HCT VFR BLD AUTO: 20.9 % (ref 34.8–46.1)
HCT VFR BLD AUTO: 23.1 % (ref 34.8–46.1)
HCT VFR BLD AUTO: 24.3 % (ref 34.8–46.1)
HGB BLD-MCNC: 6.7 G/DL (ref 11.5–15.4)
HGB BLD-MCNC: 6.8 G/DL (ref 11.5–15.4)
HGB BLD-MCNC: 7.6 G/DL (ref 11.5–15.4)
HGB BLD-MCNC: 7.9 G/DL (ref 11.5–15.4)
LYMPHOCYTES # BLD AUTO: 0.07 THOUSAND/UL (ref 0.6–4.47)
LYMPHOCYTES # BLD AUTO: 0.16 THOUSAND/UL (ref 0.6–4.47)
LYMPHOCYTES # BLD AUTO: 1 % (ref 14–44)
LYMPHOCYTES # BLD AUTO: 1 % (ref 14–44)
MACROCYTES BLD QL AUTO: PRESENT
MAGNESIUM SERPL-MCNC: 2.4 MG/DL (ref 1.9–2.7)
MCH RBC QN AUTO: 29.9 PG (ref 26.8–34.3)
MCH RBC QN AUTO: 30.4 PG (ref 26.8–34.3)
MCH RBC QN AUTO: 30.9 PG (ref 26.8–34.3)
MCH RBC QN AUTO: 31.1 PG (ref 26.8–34.3)
MCHC RBC AUTO-ENTMCNC: 32.1 G/DL (ref 31.4–37.4)
MCHC RBC AUTO-ENTMCNC: 32.5 G/DL (ref 31.4–37.4)
MCHC RBC AUTO-ENTMCNC: 32.9 G/DL (ref 31.4–37.4)
MCHC RBC AUTO-ENTMCNC: 33.3 G/DL (ref 31.4–37.4)
MCV RBC AUTO: 92 FL (ref 82–98)
MCV RBC AUTO: 93 FL (ref 82–98)
MCV RBC AUTO: 93 FL (ref 82–98)
MCV RBC AUTO: 96 FL (ref 82–98)
METAMYELOCYTES NFR BLD MANUAL: 7 % (ref 0–1)
METAMYELOCYTES NFR BLD MANUAL: 7 % (ref 0–1)
MONOCYTES # BLD AUTO: 0 THOUSAND/UL (ref 0–1.22)
MONOCYTES # BLD AUTO: 0.16 THOUSAND/UL (ref 0–1.22)
MONOCYTES NFR BLD: 0 % (ref 4–12)
MONOCYTES NFR BLD: 2 % (ref 4–12)
MYCOBACTERIUM SPEC CULT: NORMAL
MYELOCYTES NFR BLD MANUAL: 2 % (ref 0–1)
MYELOCYTES NFR BLD MANUAL: 4 % (ref 0–1)
NEUTROPHILS # BLD MANUAL: 6.12 THOUSAND/UL (ref 1.85–7.62)
NEUTROPHILS # BLD MANUAL: 6.85 THOUSAND/UL (ref 1.85–7.62)
NEUTS BAND NFR BLD MANUAL: 41 % (ref 0–8)
NEUTS BAND NFR BLD MANUAL: 44 % (ref 0–8)
NEUTS SEG NFR BLD AUTO: 42 % (ref 43–75)
NEUTS SEG NFR BLD AUTO: 46 % (ref 43–75)
NRBC BLD AUTO-RTO: 14 /100 WBC (ref 0–2)
NRBC BLD AUTO-RTO: 4 /100 WBC (ref 0–2)
OVALOCYTES BLD QL SMEAR: PRESENT
OVALOCYTES BLD QL SMEAR: PRESENT
P AXIS: 63 DEGREES
PHOSPHATE SERPL-MCNC: 6 MG/DL (ref 2.7–4.5)
PLATELET # BLD AUTO: 45 THOUSANDS/UL (ref 149–390)
PLATELET # BLD AUTO: 46 THOUSANDS/UL (ref 149–390)
PLATELET # BLD AUTO: 64 THOUSANDS/UL (ref 149–390)
PLATELET # BLD AUTO: 66 THOUSANDS/UL (ref 149–390)
PLATELET BLD QL SMEAR: ABNORMAL
PLATELET BLD QL SMEAR: ABNORMAL
PMV BLD AUTO: 12.1 FL (ref 8.9–12.7)
PMV BLD AUTO: 12.9 FL (ref 8.9–12.7)
PMV BLD AUTO: 13 FL (ref 8.9–12.7)
PMV BLD AUTO: 13 FL (ref 8.9–12.7)
POIKILOCYTOSIS BLD QL SMEAR: PRESENT
POIKILOCYTOSIS BLD QL SMEAR: PRESENT
POLYCHROMASIA BLD QL SMEAR: PRESENT
POTASSIUM SERPL-SCNC: 4.4 MMOL/L (ref 3.5–5.3)
PR INTERVAL: 152 MS
PROMYELOCYTES NFR BLD MANUAL: 1 % (ref 0–0)
PROMYELOCYTES NFR BLD MANUAL: 1 % (ref 0–0)
QRS AXIS: 54 DEGREES
QRSD INTERVAL: 86 MS
QT INTERVAL: 308 MS
QTC INTERVAL: 395 MS
RBC # BLD AUTO: 2.2 MILLION/UL (ref 3.81–5.12)
RBC # BLD AUTO: 2.24 MILLION/UL (ref 3.81–5.12)
RBC # BLD AUTO: 2.5 MILLION/UL (ref 3.81–5.12)
RBC # BLD AUTO: 2.54 MILLION/UL (ref 3.81–5.12)
RBC MORPH BLD: PRESENT
RBC MORPH BLD: PRESENT
RHODAMINE-AURAMINE STN SPEC: NORMAL
SCAN RESULT: NORMAL
SODIUM SERPL-SCNC: 141 MMOL/L (ref 135–147)
T WAVE AXIS: 93 DEGREES
UNIT DISPENSE STATUS: NORMAL
UNIT PRODUCT CODE: NORMAL
UNIT PRODUCT VOLUME: 253 ML
UNIT PRODUCT VOLUME: 300 ML
UNIT PRODUCT VOLUME: 350 ML
UNIT RH: NORMAL
VANCOMYCIN SERPL-MCNC: 38.3 UG/ML (ref 10–20)
VARIANT LYMPHS # BLD AUTO: 1 %
VENTRICULAR RATE: 99 BPM
WBC # BLD AUTO: 7.03 THOUSAND/UL (ref 4.31–10.16)
WBC # BLD AUTO: 7.68 THOUSAND/UL (ref 4.31–10.16)
WBC # BLD AUTO: 7.75 THOUSAND/UL (ref 4.31–10.16)
WBC # BLD AUTO: 7.96 THOUSAND/UL (ref 4.31–10.16)

## 2024-04-02 PROCEDURE — 99233 SBSQ HOSP IP/OBS HIGH 50: CPT | Performed by: STUDENT IN AN ORGANIZED HEALTH CARE EDUCATION/TRAINING PROGRAM

## 2024-04-02 PROCEDURE — 94760 N-INVAS EAR/PLS OXIMETRY 1: CPT

## 2024-04-02 PROCEDURE — C9113 INJ PANTOPRAZOLE SODIUM, VIA: HCPCS | Performed by: INTERNAL MEDICINE

## 2024-04-02 PROCEDURE — NC001 PR NO CHARGE: Performed by: INTERNAL MEDICINE

## 2024-04-02 PROCEDURE — 87254 VIRUS INOCULATION SHELL VIA: CPT | Performed by: INTERNAL MEDICINE

## 2024-04-02 PROCEDURE — 94003 VENT MGMT INPAT SUBQ DAY: CPT

## 2024-04-02 PROCEDURE — 94664 DEMO&/EVAL PT USE INHALER: CPT | Performed by: SOCIAL WORKER

## 2024-04-02 PROCEDURE — 43239 EGD BIOPSY SINGLE/MULTIPLE: CPT | Performed by: INTERNAL MEDICINE

## 2024-04-02 PROCEDURE — 94640 AIRWAY INHALATION TREATMENT: CPT

## 2024-04-02 PROCEDURE — 88305 TISSUE EXAM BY PATHOLOGIST: CPT | Performed by: PATHOLOGY

## 2024-04-02 PROCEDURE — P9037 PLATE PHERES LEUKOREDU IRRAD: HCPCS

## 2024-04-02 PROCEDURE — P9040 RBC LEUKOREDUCED IRRADIATED: HCPCS

## 2024-04-02 PROCEDURE — 85027 COMPLETE CBC AUTOMATED: CPT | Performed by: STUDENT IN AN ORGANIZED HEALTH CARE EDUCATION/TRAINING PROGRAM

## 2024-04-02 PROCEDURE — 83735 ASSAY OF MAGNESIUM: CPT | Performed by: STUDENT IN AN ORGANIZED HEALTH CARE EDUCATION/TRAINING PROGRAM

## 2024-04-02 PROCEDURE — 99232 SBSQ HOSP IP/OBS MODERATE 35: CPT | Performed by: NURSE PRACTITIONER

## 2024-04-02 PROCEDURE — P9017 PLASMA 1 DONOR FRZ W/IN 8 HR: HCPCS

## 2024-04-02 PROCEDURE — 99291 CRITICAL CARE FIRST HOUR: CPT | Performed by: INTERNAL MEDICINE

## 2024-04-02 PROCEDURE — 94669 MECHANICAL CHEST WALL OSCILL: CPT

## 2024-04-02 PROCEDURE — 93010 ELECTROCARDIOGRAM REPORT: CPT | Performed by: INTERNAL MEDICINE

## 2024-04-02 PROCEDURE — 88313 SPECIAL STAINS GROUP 2: CPT | Performed by: PATHOLOGY

## 2024-04-02 PROCEDURE — 84100 ASSAY OF PHOSPHORUS: CPT

## 2024-04-02 PROCEDURE — 87899 AGENT NOS ASSAY W/OPTIC: CPT | Performed by: STUDENT IN AN ORGANIZED HEALTH CARE EDUCATION/TRAINING PROGRAM

## 2024-04-02 PROCEDURE — 99233 SBSQ HOSP IP/OBS HIGH 50: CPT | Performed by: PHYSICIAN ASSISTANT

## 2024-04-02 PROCEDURE — 99292 CRITICAL CARE ADDL 30 MIN: CPT | Performed by: INTERNAL MEDICINE

## 2024-04-02 PROCEDURE — 30233L1 TRANSFUSION OF NONAUTOLOGOUS FRESH PLASMA INTO PERIPHERAL VEIN, PERCUTANEOUS APPROACH: ICD-10-PCS | Performed by: INTERNAL MEDICINE

## 2024-04-02 PROCEDURE — 0DC28ZZ EXTIRPATION OF MATTER FROM MIDDLE ESOPHAGUS, VIA NATURAL OR ARTIFICIAL OPENING ENDOSCOPIC: ICD-10-PCS | Performed by: INTERNAL MEDICINE

## 2024-04-02 PROCEDURE — 87305 ASPERGILLUS AG IA: CPT | Performed by: STUDENT IN AN ORGANIZED HEALTH CARE EDUCATION/TRAINING PROGRAM

## 2024-04-02 PROCEDURE — 88312 SPECIAL STAINS GROUP 1: CPT | Performed by: PATHOLOGY

## 2024-04-02 PROCEDURE — C9254 INJECTION, LACOSAMIDE: HCPCS | Performed by: STUDENT IN AN ORGANIZED HEALTH CARE EDUCATION/TRAINING PROGRAM

## 2024-04-02 PROCEDURE — 85007 BL SMEAR W/DIFF WBC COUNT: CPT | Performed by: STUDENT IN AN ORGANIZED HEALTH CARE EDUCATION/TRAINING PROGRAM

## 2024-04-02 PROCEDURE — 80202 ASSAY OF VANCOMYCIN: CPT | Performed by: INTERNAL MEDICINE

## 2024-04-02 PROCEDURE — 99222 1ST HOSP IP/OBS MODERATE 55: CPT | Performed by: INTERNAL MEDICINE

## 2024-04-02 PROCEDURE — 99153 MOD SED SAME PHYS/QHP EA: CPT | Performed by: INTERNAL MEDICINE

## 2024-04-02 PROCEDURE — 88360 TUMOR IMMUNOHISTOCHEM/MANUAL: CPT | Performed by: PATHOLOGY

## 2024-04-02 PROCEDURE — 82948 REAGENT STRIP/BLOOD GLUCOSE: CPT

## 2024-04-02 PROCEDURE — 88342 IMHCHEM/IMCYTCHM 1ST ANTB: CPT | Performed by: PATHOLOGY

## 2024-04-02 PROCEDURE — 87252 VIRUS INOCULATION TISSUE: CPT | Performed by: INTERNAL MEDICINE

## 2024-04-02 PROCEDURE — 0DB68ZX EXCISION OF STOMACH, VIA NATURAL OR ARTIFICIAL OPENING ENDOSCOPIC, DIAGNOSTIC: ICD-10-PCS | Performed by: INTERNAL MEDICINE

## 2024-04-02 PROCEDURE — 80048 BASIC METABOLIC PNL TOTAL CA: CPT

## 2024-04-02 PROCEDURE — 88341 IMHCHEM/IMCYTCHM EA ADD ANTB: CPT | Performed by: PATHOLOGY

## 2024-04-02 PROCEDURE — 99152 MOD SED SAME PHYS/QHP 5/>YRS: CPT | Performed by: INTERNAL MEDICINE

## 2024-04-02 PROCEDURE — 93005 ELECTROCARDIOGRAM TRACING: CPT

## 2024-04-02 RX ORDER — SODIUM CHLORIDE, SODIUM GLUCONATE, SODIUM ACETATE, POTASSIUM CHLORIDE, MAGNESIUM CHLORIDE, SODIUM PHOSPHATE, DIBASIC, AND POTASSIUM PHOSPHATE .53; .5; .37; .037; .03; .012; .00082 G/100ML; G/100ML; G/100ML; G/100ML; G/100ML; G/100ML; G/100ML
1000 INJECTION, SOLUTION INTRAVENOUS ONCE
Status: COMPLETED | OUTPATIENT
Start: 2024-04-02 | End: 2024-04-02

## 2024-04-02 RX ORDER — LACOSAMIDE 10 MG/ML
100 INJECTION, SOLUTION INTRAVENOUS EVERY 12 HOURS
Status: DISCONTINUED | OUTPATIENT
Start: 2024-04-02 | End: 2024-04-09

## 2024-04-02 RX ORDER — FLUCONAZOLE 2 MG/ML
400 INJECTION, SOLUTION INTRAVENOUS EVERY 24 HOURS
Status: DISCONTINUED | OUTPATIENT
Start: 2024-04-03 | End: 2024-04-03

## 2024-04-02 RX ORDER — MINOCYCLINE HYDROCHLORIDE 50 MG/1
200 TABLET ORAL EVERY 12 HOURS SCHEDULED
Status: DISCONTINUED | OUTPATIENT
Start: 2024-04-02 | End: 2024-04-02

## 2024-04-02 RX ORDER — FENTANYL CITRATE 50 UG/ML
100 INJECTION, SOLUTION INTRAMUSCULAR; INTRAVENOUS ONCE
Status: COMPLETED | OUTPATIENT
Start: 2024-04-02 | End: 2024-04-02

## 2024-04-02 RX ORDER — PROPOFOL 10 MG/ML
INJECTION, EMULSION INTRAVENOUS
Status: DISPENSED
Start: 2024-04-02 | End: 2024-04-03

## 2024-04-02 RX ORDER — MIDAZOLAM HYDROCHLORIDE 2 MG/2ML
INJECTION, SOLUTION INTRAMUSCULAR; INTRAVENOUS
Status: COMPLETED
Start: 2024-04-02 | End: 2024-04-02

## 2024-04-02 RX ORDER — SULFAMETHOXAZOLE AND TRIMETHOPRIM 800; 160 MG/1; MG/1
1 TABLET ORAL 3 TIMES WEEKLY
Status: DISCONTINUED | OUTPATIENT
Start: 2024-04-03 | End: 2024-04-02

## 2024-04-02 RX ORDER — METHYLPREDNISOLONE SODIUM SUCCINATE 40 MG/ML
30 INJECTION, POWDER, LYOPHILIZED, FOR SOLUTION INTRAMUSCULAR; INTRAVENOUS DAILY
Status: DISCONTINUED | OUTPATIENT
Start: 2024-04-03 | End: 2024-04-05

## 2024-04-02 RX ORDER — FLUCONAZOLE 200 MG/100ML
800 INJECTION, SOLUTION INTRAVENOUS EVERY 24 HOURS
Status: COMPLETED | OUTPATIENT
Start: 2024-04-02 | End: 2024-04-02

## 2024-04-02 RX ORDER — PROPOFOL 10 MG/ML
5-50 INJECTION, EMULSION INTRAVENOUS
Status: DISCONTINUED | OUTPATIENT
Start: 2024-04-02 | End: 2024-04-02

## 2024-04-02 RX ORDER — MIDAZOLAM HYDROCHLORIDE 2 MG/2ML
4 INJECTION, SOLUTION INTRAMUSCULAR; INTRAVENOUS ONCE
Status: COMPLETED | OUTPATIENT
Start: 2024-04-02 | End: 2024-04-02

## 2024-04-02 RX ORDER — FLUCONAZOLE 200 MG/100ML
800 INJECTION, SOLUTION INTRAVENOUS EVERY 24 HOURS
Status: DISCONTINUED | OUTPATIENT
Start: 2024-04-02 | End: 2024-04-02

## 2024-04-02 RX ORDER — MINOCYCLINE HYDROCHLORIDE 50 MG/1
100 TABLET ORAL EVERY 12 HOURS SCHEDULED
Status: DISCONTINUED | OUTPATIENT
Start: 2024-04-02 | End: 2024-04-02

## 2024-04-02 RX ORDER — METOCLOPRAMIDE HYDROCHLORIDE 5 MG/ML
10 INJECTION INTRAMUSCULAR; INTRAVENOUS EVERY 8 HOURS
Status: DISCONTINUED | OUTPATIENT
Start: 2024-04-02 | End: 2024-04-02

## 2024-04-02 RX ADMIN — MIDAZOLAM 2 MG: 1 INJECTION INTRAMUSCULAR; INTRAVENOUS at 14:12

## 2024-04-02 RX ADMIN — MIDAZOLAM HYDROCHLORIDE 2 MG: 2 INJECTION, SOLUTION INTRAMUSCULAR; INTRAVENOUS at 14:12

## 2024-04-02 RX ADMIN — SODIUM CHLORIDE SOLN NEBU 3% 4 ML: 3 NEBU SOLN at 13:19

## 2024-04-02 RX ADMIN — PANTOPRAZOLE SODIUM 40 MG: 40 INJECTION, POWDER, FOR SOLUTION INTRAVENOUS at 21:52

## 2024-04-02 RX ADMIN — LACOSAMIDE 100 MG: 10 INJECTION, SOLUTION INTRAVENOUS at 21:52

## 2024-04-02 RX ADMIN — SODIUM CHLORIDE, SODIUM GLUCONATE, SODIUM ACETATE, POTASSIUM CHLORIDE, MAGNESIUM CHLORIDE, SODIUM PHOSPHATE, DIBASIC, AND POTASSIUM PHOSPHATE 1000 ML: .53; .5; .37; .037; .03; .012; .00082 INJECTION, SOLUTION INTRAVENOUS at 12:24

## 2024-04-02 RX ADMIN — Medication 800 MG: at 10:25

## 2024-04-02 RX ADMIN — CEFEPIME 2000 MG: 2 INJECTION, POWDER, FOR SOLUTION INTRAVENOUS at 04:43

## 2024-04-02 RX ADMIN — TOPIRAMATE 50 MG: 100 TABLET, FILM COATED ORAL at 08:41

## 2024-04-02 RX ADMIN — PANTOPRAZOLE SODIUM 40 MG: 40 INJECTION, POWDER, FOR SOLUTION INTRAVENOUS at 08:41

## 2024-04-02 RX ADMIN — LEVALBUTEROL HYDROCHLORIDE 1.25 MG: 1.25 SOLUTION RESPIRATORY (INHALATION) at 13:19

## 2024-04-02 RX ADMIN — LACOSAMIDE ORAL SOLUTION 100 MG: 10 SOLUTION ORAL at 08:41

## 2024-04-02 RX ADMIN — DOXYCYCLINE 100 MG: 100 INJECTION, POWDER, LYOPHILIZED, FOR SOLUTION INTRAVENOUS at 23:10

## 2024-04-02 RX ADMIN — NYSTATIN: 100000 POWDER TOPICAL at 08:41

## 2024-04-02 RX ADMIN — MINOCYCLINE HYDROCHLORIDE 100 MG: 50 TABLET, FILM COATED ORAL at 08:44

## 2024-04-02 RX ADMIN — FENTANYL CITRATE 25 MCG: 50 INJECTION INTRAMUSCULAR; INTRAVENOUS at 06:22

## 2024-04-02 RX ADMIN — CHLORHEXIDINE GLUCONATE 0.12% ORAL RINSE 15 ML: 1.2 LIQUID ORAL at 21:52

## 2024-04-02 RX ADMIN — METHYLPREDNISOLONE SODIUM SUCCINATE 40 MG: 40 INJECTION, POWDER, FOR SOLUTION INTRAMUSCULAR; INTRAVENOUS at 08:41

## 2024-04-02 RX ADMIN — Medication 2 SPRAY: at 21:53

## 2024-04-02 RX ADMIN — MICAFUNGIN SODIUM 100 MG: 50 INJECTION, POWDER, LYOPHILIZED, FOR SOLUTION INTRAVENOUS at 20:08

## 2024-04-02 RX ADMIN — FENTANYL CITRATE 100 MCG: 50 INJECTION INTRAMUSCULAR; INTRAVENOUS at 13:45

## 2024-04-02 RX ADMIN — VENLAFAXINE 12.5 MG: 25 TABLET ORAL at 08:42

## 2024-04-02 RX ADMIN — CHLORHEXIDINE GLUCONATE 0.12% ORAL RINSE 15 ML: 1.2 LIQUID ORAL at 08:41

## 2024-04-02 RX ADMIN — LEVALBUTEROL HYDROCHLORIDE 1.25 MG: 1.25 SOLUTION RESPIRATORY (INHALATION) at 19:20

## 2024-04-02 RX ADMIN — Medication 2 SPRAY: at 08:25

## 2024-04-02 RX ADMIN — POLYETHYLENE GLYCOL 3350 17 G: 17 POWDER, FOR SOLUTION ORAL at 08:41

## 2024-04-02 RX ADMIN — SODIUM CHLORIDE SOLN NEBU 3% 4 ML: 3 NEBU SOLN at 08:02

## 2024-04-02 RX ADMIN — NYSTATIN: 100000 POWDER TOPICAL at 17:59

## 2024-04-02 RX ADMIN — LEVALBUTEROL HYDROCHLORIDE 1.25 MG: 1.25 SOLUTION RESPIRATORY (INHALATION) at 08:02

## 2024-04-02 RX ADMIN — SODIUM CHLORIDE SOLN NEBU 3% 4 ML: 3 NEBU SOLN at 19:20

## 2024-04-02 NOTE — PROGRESS NOTES
[facility]  Progress Note  Name: Carla Hunt I  MRN: [id number]  Unit/Bed#: MICU 10 I Date of Admission: 1/10/2024   Date of Service: 4/2/2024 I Hospital Day: 83    Assessment/Plan   Goals of care, counseling/discussion  Assessment & Plan  Code Status: full - Level 1  Ongoing medical optimization without limits at this time. Hopeful for slow recovery. Min does express understanding of periodic setbacks but is hoping Carla can build some momentum to become medically stable for d/c to rehab.   Min feels discouraged by setbacks over the last several days.    Palliative care patient  Assessment & Plan  Time spent providing supportive listening to spouse Min at bedside. Extended offer of support to patient's children.  Min continues to be consistently present and invested in patient's care.   Also supported by her daughter, son, and robust  group of friends, brother, and natalia community  Decisional apparatus:  Patient is not competent on my exam today.  If competence is lost, patient's substitute decision maker would default to spouse Min by [place] Act 169.  Advance Directive / Living Will / POLST:  None on file, unable to complete    Pancytopenia (HCC)  Assessment & Plan  CBI to be initiated for hematuria  GI consulted for concern of upper GIB  Appreciate heme onc input    Fungemia  Assessment & Plan  Candida detected in 2/2 blood cultures. Started on micafungin and fluconazole.     Cecal volvulus (HCC)  Assessment & Plan  S/p ostomy. Surgery monitoring closely, patient had VAC change yesterday for midline wound dehiscence.     Encephalopathy  Assessment & Plan  Primary team working up possible infectious source contributing to decreased wakefulness. Provigil held.  Identified to have fungemia, treatment started.  Unfortunately, Carla remains encephalopathic, not interactive during time of encounter.    * Acute respiratory failure with hypoxia  ([facility])  Assessment & Plan  Admitted, severe COVID course complicated by incidences of pneumonia  Patient was re-intubated 3/11 with subsequent bedside trach on 3/12.   Very careful steroid wean  per critical care team.   Patient back on ventilator support, previously on trach collar and using PMV         Interval history:       2/2 blood cultures grew out candida. Started on micafungin and fluconazole. Patient remains very drowsy, does not interact. Remains on vent. Pending GI evaluation for concern of GIB and CBI initiated for hematuria. Spouse, Min, at bedside. Provided supportive listening.    MEDICATIONS / ALLERGIES:     all current active meds have been reviewed    No Known Allergies    OBJECTIVE:    Physical Exam  Physical Exam  Constitutional:       Appearance: She is ill-appearing.   HENT:      Head: Atraumatic.   Cardiovascular:      Rate and Rhythm: Tachycardia present.   Pulmonary:      Comments: Ventilated via trach  Abdominal:      Comments: Wound visualized via media tab/surgery documentation   Genitourinary:     Comments: Hematuria in marie bag  Musculoskeletal:         General: Swelling present.   Skin:     General: Skin is warm and dry.   Neurological:      Comments: Does not open eyes or follow simple commands   Psychiatric:      Comments: calm         Lab Results:   Results from last 7 days   Lab Units 04/02/24  0837 04/02/24  0450 04/01/24 2013 04/01/24  0430 03/31/24  1235 03/31/24  0545   WBC Thousand/uL 7.75 7.03 5.73 5.29  --  2.47*   HEMOGLOBIN g/dL 7.6* 7.9* 6.7* 7.3*   < > 6.1*   HEMATOCRIT % 23.1* 24.3* 20.7* 22.6*   < > 19.5*   PLATELETS Thousands/uL 64* 66* 45* 36*  --  51*   MONO PCT %  --  0*  --  6  --  9   EOS PCT %  --  0  --  0  --  1    < > = values in this interval not displayed.     Results from last 7 days   Lab Units 04/02/24  0802 04/01/24  0430 03/31/24  0545 03/30/24  0454   POTASSIUM mmol/L 4.4 3.6 3.7 3.5   CHLORIDE mmol/L 110* 109* 108 112*   CO2 mmol/L 20* 21 21  18*   BUN mg/dL 105* 104* 78* 61*   CREATININE mg/dL 1.44* 1.24 0.96 0.70   CALCIUM mg/dL 10.3* 10.6* 10.2 10.0   ALK PHOS U/L  --   --   --  196*   ALT U/L  --   --   --  45   AST U/L  --   --   --  12*       Imaging Studies: reviewed pertinent studies   EKG, Pathology, and Other Studies: reviewed pertinent studies    Counseling / Coordination of Care    Total floor / unit time spent today 35 minutes. Greater than 50% of total time was spent with the patient and / or family counseling and / or coordination of care. A description of the counseling / coordination of care: time spent assessing patient, providing support to spouse, communicating with RN, primary team.

## 2024-04-02 NOTE — ASSESSMENT & PLAN NOTE
CBI to be initiated for hematuria  GI consulted for concern of upper GIB  Appreciate heme onc input

## 2024-04-02 NOTE — QUICK NOTE
Patient previously followed by nephrology during this admission. Patient has had prolonged hospitalization for 82 days.   Patient's sCr is rising steadily but suspect this is in part due to impaired Cr secretion from bactrim use +/- dehydration in the setting of hypercalcemia.   Patient is now NPO with concern for possible GIB in light of disproportionately elevated BUN > 100. Has required blood transfusion in the past in setting of history of B cell lymphoma previously on rituxan.  BUN has risen since 3/30/24 which correlates with methylprednisolone begun 3/29/24. Concern for catabolic state. I also note high albumin and high serum Cr suggestive of component of dehydration.   Recommend IVF. Suspect hypercalcemia possibly due to immobility.  Suspect ELIESER from dehydration. Provide IVF. Repeat am BMP.    Nephrology to see formally tomorrow once GI consult completed as concern for possible GIB.

## 2024-04-02 NOTE — PROGRESS NOTES
Progress Note - Infectious Disease   Carla Hunt 59 y.o. female MRN: [id number]  [facility] En[facility]: [id number]      Impression/Plan:    1. Acute hypoxic respiratory failure, likely multifactorial, with both COVID and bacterial pneumonia contributing.  Also consider persistent inflammation, fibrosis as seems quite steroid responsive in past.  Most recently extubated 3/1.  Patient with worsening respiratory status requiring intubation again 3/11.  Suspect ongoing hypoxia related to persistent COVID-pneumonia, superimposed bacterial infection, inflammatory component/lung fibrosis related to COVID, now with profound deconditioning and muscle weakness.  Status post bronchoscopy and trach 3/12 with excessive secretions seen from the lower lobes bilaterally.  BAL culture from 3/11 shows heavy growth of Stenotrophomonas maltophilia, Candida albicans.  PJP DFA negative.  Fungitell was positive but patient was on beta-lactam antibiotics and had received multiple blood transfusions.  She was clinically improving and monitor off antifungals. Status post 10 days of vancomycin and cefepime, 14-day course of remdesivir/Paxlovid 3/15, 14-day course of antibiotics with minocycline for stenotrophomonas pneumonia. Status post repeat bronchoscopy 3/17 for airway clearance with continued thick secretions.   Histoplasma urine antigen negative.  Patient was clinically improving and weaned to trach collar but 3/30 had worsening lethargy, again placed on the ventilator 3/31.  Repeat CT chest showed new consolidation in the right upper lobe, CT head unchanged.  Antibiotics restarted.  Sputum culture is now growing stenotrophomonas maltophilia and mixed respiratory lauren.   -Stop vancomycin and meropenem   -Continue minocycline but increase dose to 200 mg twice daily.  Tube feeds should be held for 1 hour prior to and 1 hour after minocycline administration. E test requested by micro lab  -Steroid dosing per critical care,  agree with more aggressive wean  -Antifungals as below  -Follow-up cryptococcal serum antigen, aspergillus antigen  -If no improvement in mental status, recommend MRI brain     2. Candidemia.  2 sets of blood cultures collected 3/31 are growing Candida albicans.  The patient has numerous risk factors for candidemia including presence of midline, abdominal surgery, prolonged hospitalization in the ICU, multiple courses of broad-spectrum antibiotics, leukopenia/recent neutropenia.  TTE no vegetations   -For now, continue IV micafungin 100 mg every 24 hours   -Add IV fluconazole 800 mg x 1 dose followed by 400 mg daily (improved CNS penetration)   -Repeat blood cultures (routine) 4/4   -Recommend ophthalmology evaluation since she is unresponsive, has prolonged immunosuppression and neutropenia   -Consideration for SATURNINO depending on clinical course and follow-up cultures    3.  Sepsis, recurrent since admission.  Due to COVID-19 infection, recurrent pneumonias, cecal volvulus status post surgery and wound dehiscence, now with candidemia.   -Antibiotics and antifungals as above  -Follow temperatures closely  -Recheck WBC in AM to monitor infection  -Supportive care as per the primary service     3. Recurrent bacterial pneumonia.  The patient has completed multiple treatment courses over the hospitalization. Prior BAL cultures with Pseudomonas and stenotrophomonas.  Unclear if pathogens or colonizers.  Status post 10 days levofloxacin.  CT C/A/P showed evolving changes likely all due to sequelae of COVID, infections.  Noted to have worsening hypoxia and purulent secretions on bronchoscopy 3/11.  BAL culture with heavy growth of Stenotrophomonas maltophilia, Candida.  Completed 14-day course of minocycline 3/27.  CT chest now shows new right upper lobe infiltrate and sputum culture is again growing Stenotrophomonas maltophilia.  Given worsening clinical decline and new infiltrate we will continue stenotrophomonas treatment  for now              -Antibiotics as above              -Wean O2 as able     4. Severe COVID, present on admission.  Patient was treated with a 10-day course of remdesivir, dexamethasone and was given 1 dose of Tocilizumab on admission.  COVID antigen was negative prior to coming off isolation.  Had positive COVID PCR from BAL 2/16, status post another 5-day course of remdesivir.  Patient has remained on high-dose systemic corticosteroid throughout her hospitalization.  COVID antigen positive and PCR is also positive 3/3 and Ct 26.8, consistent with high viral replication.  Repeat PCR positive with Ct closer to 30. Status post 14-day course of remdesivir/Paxlovid 3/15/2024.  Rapid COVID antigen negative 3/25 and 3/27  -Steroid wean per ICU  -No additional antivirals indicated  - Bactrim for PJP prophylaxis     5. 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  Patient is status post exploratory laparotomy with ileocecectomy and end ileostomy creation 2/20.   End ileostomy is with ongoing ischemic changes which is likely contributing to the imaging findings and ongoing SIRS response.  Now with wound dehiscence status post staple removal, status post wound VAC placement 3/13, removed but replaced due to bleeding  -Serial exams  -Surgery follow-up   -no current signs of infection, need for antibiotics for this indication     B-cell lymphoma, on maintenance rituxan, which is currently postponed.  Rituximab is a risk factor for persistent COVID infection.    7.  ELIESER.  Likely multifactorial due to prerenal status, medications.  With worsening BUN and creatinine.  Concern GI bleed make a contributing to high BUN.  Would also consider if this is attributing to worsening mental status.   -Monitor creatinine closely   -Dose adjust antibiotics as needed   -Nephrology has been consulted    8.  Anemia, thrombocytopenia, lymphopenia/neutropenia.  Patient has had persistent lymphopenia throughout this admission, likely  due to rituximab, viral infection, high-dose steroids.  Now with worsening anemia, neutropenia, and thrombocytopenia.  Bactrim discontinued 3/18. CMV PCR negative.  Not a likely side effect of minocycline.  Immunoglobulins are low.  Started on Granix 3/27, white blood cell count now improved.  May be seen in IRIS type response with multiple underlying infections   -Steroid wean per primary   -Transfusion support per primary   -Hematology follow-up   -Monitor CBC    Above management plan to continue antibiotics/antifungals with Dr. Bowden of critical care. Discussed with the patient's  at bedside.  ID will follow.    Antibiotics:  Fluconazole  Micafungin  Meropenem/vancomycin    Subjective:  Mental status remains poor.  The patient remains afebrile.  She is now growing Candida albicans and was started on micafungin overnight.    Objective:  Vitals:  Temp:  [98 °F (36.7 °C)-99.5 °F (37.5 °C)] 98.5 °F (36.9 °C)  HR:  [] 128  Resp:  [16-39] 29  BP: ()/(43-73) 89/54  SpO2:  [90 %-99 %] 93 %  Temp (24hrs), Av.6 °F (37 °C), Min:98 °F (36.7 °C), Max:99.5 °F (37.5 °C)  Current: Temperature: 98.5 °F (36.9 °C)    Physical Exam:   General Appearance:  Ill-appearing, lethargic   Neck: Trach in place.   Lungs:   Minimal rhonchi bilaterally   Heart:  RRR; no murmur, rub or gallop   Abdomen:   Midline ostomy with brown stool, wound VAC in place   Extremities: No edema   Skin: No new rashes or lesions.        Labs:   All pertinent labs and imaging studies were personally reviewed  Results from last 7 days   Lab Units 24  0837 24  0450 24   WBC Thousand/uL 7.75 7.03 5.73   HEMOGLOBIN g/dL 7.6* 7.9* 6.7*   PLATELETS Thousands/uL 64* 66* 45*     Results from last 7 days   Lab Units 24  0430 24  0545 24  0454   SODIUM mmol/L 143 141 141   POTASSIUM mmol/L 3.6 3.7 3.5   CHLORIDE mmol/L 109* 108 112*   CO2 mmol/L 21 21 18*   BUN mg/dL 104* 78* 61*   CREATININE mg/dL 1.24 0.96  0.70   EGFR ml/min/1.73sq m 47 65 95   CALCIUM mg/dL 10.6* 10.2 10.0   AST U/L  --   --  12*   ALT U/L  --   --  45   ALK PHOS U/L  --   --  196*     Results from last 7 days   Lab Units 04/01/24  1431 03/31/24  0545   PROCALCITONIN ng/ml 7.19* 6.28*       Results from last 7 days   Lab Units 03/31/24  0545   CRP mg/L 331.2*                       Imaging:  CT chest with new right upper lobe consolidation

## 2024-04-02 NOTE — CONSULTS
This 59-year-old woman intubated and sedated in the medical ICU has Candida septicemia.  I was consulted to ascertain the presence or absence of fungal endophthalmitis.    Both eyes were dilated with Mydriacyl and Bobby-Synephrine at 6:15 PM.    The media is clear in both eyes.  The fundus examination is unremarkable.    Impression: Candida sepsis, no evidence of fungal endophthalmitis.    Plan: Observation.  Please reconsult as needed.

## 2024-04-02 NOTE — PROGRESS NOTES
[facility]  Progress Note: Critical Care  Name: Carla Hunt 59 y.o. female I MRN: [id number]  Unit/Bed#: MICU 10 I Date of Admission: 1/10/2024   Date of Service: 4/2/2024 I Hospital Day: 83    Assessment/Plan   Acute hypoxic respiratory failure; s/p tracheostomy 3/12  Candidiasis; Bcx x2 (3/31) with fungemia, identified as candida albicans   Recurrent Stenotrophomonas PNA, previously on Bactrim, switched to minocycline 2/2 ELIESER now resolved, completed 14d course 3/27. On minocycline currently day 1  Bilateral ground glass opacities, persistent, improving  Pancytopenia- multifactorial including hx b-cell lymphoma, persistent inflammation, s/p granix  Acute on chronic anemia- multifactorial-intermittent blood loss from ostomy site, chronic inflammation, iatrogenic, marrow dysfunction in setting of persistent inflammation   Lymphopenia with bandemia, in setting of high dose corticosteroids, improving s/p granix  Severe metabolic encephalopathy, multifactorial including ?uremia, improving  Uremia, ?catabolic from steroids, improving  Oropharyngeal dysphagia, likely 2/2 multiple prolonged intubations  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; complicated by poor wound healing  Wound dehiscence 2/2 poor wound healing s/p wound vac 3/13  Cutaneous paratracheal ulceration  B-Cell lymphoma, s/p chemotheraphy 2022, Rituxan maintenance therapy on hold  Seizure disorder; on vimpat  Steroid induced hyperglycemia;on insulin gtt  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  Hx of complex migraines s/p L temporal lobectomy 2007 complicated by #19  Hx of complex seizures in setting of #17, on AEDs  COVID-19 infection POA; resolved; s/p multiple courses of antivirals 2/2 persistent infection,  most recent Remdesivir course completed 3/15, superimposed by recurrent PNA s/p multiple courses Abx  Minimal SAH POA, no interventions required, resolved on repeat  CTH     Neuro:  Diagnosis: Alertness  -Hold modafinil 200mg qd delerium precautions  Diagnosis: Analgesia  - PRN Fentanyl 25mcg/hr; on holiday for frequent neurochecks     Diagnosis: Metabolic encephalopathy; POA, improving  -Waxing and waning neuro exam since admission  -Most recent repeat CTH 3/13 negative for acute intracranial findings.  Has had extensive neurological workup including MRI/CT neuroimaging, LP with unremarkable findings  -Now remains off sedation. Electrolytes, sodium, Hyperglycemia 2/2 high dose steroids requiring insulin gtt. Ammonia normal. May be prolonged 2/2 PNA, possibly worsened by uremic encephalopathy worsened by ELIESER now resolved, uremia improving; candida growth seen on BAL Cx from 3/11, may be respiraotry colonization, however currently without signs of systemic fungal infection but is at high risk 2/2 lymphopenia, depressed immunoglobulins in setting of B cell lymphoma and high dose corticosteroids.   3/31- More obtunded, mentation worsening. STAT CTH w no new changes.   Bcx 2/2 (3/31) growing fungemia, identified as candida albicans    Plan:  -cryptococcal ag pending  -Aspergillus galactomannan ag pending  -Tb quantiferon gold pending  -Slow steroid wean: currently IV Solumedrol 40mg BID 7d course  -S/P 14d Remdesivir course to tx COVID-19, completed 3/15  -S/P 14d Minocycline course to tx stenotrophomonas PNA, completed 3/27   -Continue modafinil as above  -Avoid PRN fentanyl/sedative meds as able.  -Delirium precautions  -Frequent neurochecks  -Defer MRI brain due to clinical improvement, not likely to      Diagnosis: seizure disorder, known history  -S/p partial left temporal lobe resection in 2007 2/2 complex migraines  -On Lamictal 150 bid and Topamax 50mg bid at home  -Last lamotrigine level from 03/02 at 10.7 (therapeutic)  -Home Lamictal switched to Vimpat 3/27 due to worsening pancytopenia, neuro following  -Continue Vimpat 100mg bid maintenance  dose  -Continue home topiramate 50mg bid  -Seizure precautions      Diagnosis: Anxiety, known history  -On Effexor 12.5 mg TID     CV:  -Diagnosis: Sinus tachycardia  -TTE 3/17 with no major structural dysfunction  -Multifactorial 2/2 deconditioning, dehydration/hypvol, acute on chronic anemia, underlying infectious/inflammatory process, pain, Modafinal-induced  TTE 4/1 with EF 70%, no WMA, no changes from prior  Plan:  - See plans under Pulm, Heme/Onc, ID, MSK      Pulm:  Diagnosis: Acute hypoxic respiratory failure;  Diagnosis: Bilateral ground glass opacities, improving  -Vent dependent; s/p trach 3/12 after was reintubated 3/11 due to increased WOB  -Persistently COVID+ since admission s/p multiple courses of antivirals (most recent completed 3/15), complicated by recurrent bacterial PNA treated w 10d levaquin (completed 2/25) for MDR PNA; most recent BAL +recurrent stenotrophomona treated with Bactrim, swithced to minocyline 14d course completed 3/28.  -Etiology Likely multifactorial including possible COVID pneumonitis vs recurrent PNA vs hypersensitivity pneumonitis 2/2 ?rituxumab. Persistent inflammation likely given patient has been steroid responsive throughout admission.   -CRP increasing but likely from intraabdominal inflammation as patient is noted to have clinical improvement with weaning of steroids, ABx.   -Repeat CXR 3/22 with significant improvement of multifocal PNA. Repeat COVID ag negative x2 3/27  -Follow up anti-Rituximab ab  Plan:  -Aspergillus galactomannan ag pending  -Tb quantiferon gold pending  -S/P 14d Remdesivir course to tx COVID-19, completed 3/15  -S/P 14d Minocycline course to tx stenotrophomonas PNA, completed 3/27   -Steroid wean 40mg qd x7d, decrease by 10mg qd dosing x7d  -IV diuresis if vol overloaded  -Hold off on starting empiric antifungal given clinical improvement  -Continue trach collar, wean O2 as able     GI:  Diagnosis: Partial cecal volvulus s/p right hemicolectomy  with ostomy pouch in place  -Complicated by poor wound healing of midline incision as evidenced by wound dehiscence s/p wound vac that was removed 3/17 by gen surg.  -Stoma with dark brown output, consistent with prior  Plan:  -Wound vac as per general surgery  -Monitor ostomy pouch output  -Surgery following, will keep them updated if any further changes     Diagnosis: oropharyngeal dysphagia   -Has had multiple and prolonged intubations now s/p trach   -VBS 3/27 oropharyngeal dysphagia  -Speech therapy to begin neuromuscular electrical stim/NMES/VitalStim pending off ventilation   -Continue tube feeds for now until PEG placement pending abdominal wound healing as per Gen Surg    :  Diagnosis: uremia, improving  -?catabolic from steroids, may also have ?slow UGIB in setting of prolonged steroids though no overt s/s of GIB and H&H stable since 3/23 and patient is on ppi  -Trending down  -Will consider GI consultation for potential EGD inpatient if clinical s/s UGIB     Diagnosis: CKD III,   -Baseline Cr  0.8-1.2  -Cr now stable and at baseline.   -UO adequate, on Ortiz, draining clear yellow urine  -Prerenal azotemia 2/2 uremia,   Plan:  -Trend daily BMP  -Continue to monitor I/O and UOP  -Avoid nephrotoxins, contrast dye as able  -See plan under uremia     F/E/N:  F: none  E: monitor and replete for goal K>4, P>3, Mg>2  N: tube feeds with vital 1.5; 45 cc/h, Prosource liquid protein to 1 packet BID, Refugio BID to support wound healing       Heme/Onc:  Diagnosis: Pancytopenia  -In setting of hx of B cell lymphoma, prior chemo, Rituxan therapy, prior abx and present meds including lamictal  -Hemo onc consulted, empirically given Filgastrim 300mg qd x3d (complated 3/30); IR bone marrow bx deferred by heme-onc   -Lamictal switched to Vimpat in consultation with neuro as above due to potential contribution to pancytopenia  -Trend CBC and diff daily, neutropenic precautions for ANC <500    Diagnosis: Acute on chronic  anemia  -Baseline Hgb ~7-8. S/P 2U PRBCs 3/17 after repeat Hgb 5.3, total of 6U transfuse  d since admission.  -Patient had significant blood loss from ostomy site 3/20, resulting in hemorraghic shock, improved with blood transfusion; hemostasis achieved after bleeding source identified and sutured. Another large vol danie bloody output from osotomy on 3/21, bleeding source within ostomy site, sutured.    -Also having intermittent vaginal bleeding  -May still have slow GIB due to persistent uremia, GI previously consulted for EGD but was deferred due to hemodynamic instability at the time  -?Worsened by impaired marrow response liekly 2/2 persistent inflammation due to low retic index ~1 prior to most recent transfusions; normocytic with normal B12/folate levels; MCV<100. Iatrogenic cause likely contributing 2/2 frequent blood draws; chronic anemia likely persistent inflammatory state/CKD/lymphoma in setting of elevated ferritin of 974. SPEP/UPEP negative. Hemolysis workup negative.   Plan:  -Routine monitoring ostomy output, if bleeding, apply direct pressure and contact gen surg STAT for hemostasis .  -Continue tube feeds/nutritional support  -Trend CBC, transfuse Transfuse with leukoreduced and irradiated RBCs to maintain Hgb>7  -See plan under pancytopenia     Diagnosis: Thrombocytopenia  -Likely multifactorial including imparied production from marrow dysfunction in setting of persistent inflammation; RI low suggestive of hyperproliferation, hx of B-cell lymphoma, recent infections including persistent COVID, PNA treated, CMV negative. No known history of vWD/congenital disorders. No liver dysfunction; recent hepatic profile unremarkable. HIT workup negative, Hemolytic workup negative. No s/s DIC. No mechanical valves. ?Primary ITP.  Plan:  -Filgrastim 300mg x3 to aid in plt recovery  -Transfuse with leukoreduced and irradiated PLTs if count <20k due to increased risk of bleeding   -Goal >50k  -Hold Lovenox for  DVT ppx, resume once repeat H&H>7  -Resume DVT PPx if Plt>50k  -See plan under pancytopenia      Diagnosis Lymphopenia with bandemia  -In setting of high dose steroids  -Severely depressed immunoglobulins inclding IgG, IgA, IgM  -At risk for further infections  -Filgrastim 300mg x3 to aid in plt recovery  -Contact and airborne precautions    Diagnosis: B cell lymphoma  -Follows with heme/onc at [facility]  -S/P 6 cycles of chemotherapy in 20222  -Maintained on Rituxan for 2 year course PTA, started May 2022, last dose was 12/2024, treatment currently on hold in setting of persistent COVID-19 infection  Anti-Ritux Ab negative    Plan:  -Holding rituximab in setting of persistent COVID-19 infection, now resolved.  ?resume rituxubmab pending clinical stability & anti-ritux ab status in consultation with heme-onc     DVT ppx: hold lovenox 2/2 thrombocytopenia     Endo:  Diagnosis: steroid hyperglycemia and diabetes mellitus type 2, A1c at 6.6 from 01/2024  -Goal -180  -Insulin gtt     ID:  Diagnosis: fungemia   Bcx x2 (3/31) growing yeast, ID panel- candida albicans  In setting of severe lymphopenia and severely depressed immunoglobulins   Plan:   Continue micofungin  Add flucanzole  Stop all abx as it can contribute to fungal growth   Change central/midlines  Consult ophtho  TTE already done 4/1, no new changes  ID consulted about switching to amphotericin b  ID following    Diagnosis: Recurrent bacterial pneumonia  - Patient has completed multiple treatment courses of remdesivir throughout hospitalization in addition to 7 days of ceftriaxone.  - BAL cultures in 2/2024 positive for MDR Pseudomonas and stenotrophomonas treated with 10d course of Levaquin  - CT C/A/P 3/10 with persistent groundglass opacities and changes suggestive of sequelae of COVID, prior infections.  Completed 10d course of cefepime for broad spectrum coveragge (completed (3/13)  -Repeat BAL cultures from 3/11 showing 4+ growth Stenotrophomonas  maltophilia. Could be colonization given prior BAL growth of same, however due to recent intubation with prolonged corticosteroid theraphy, will begin treating as true pathogen. Patient otherwise remains afebrile, no leukocytosis. CRP with down trending.  Previously on Bactrim from 3/13 to 3/18, discontinued due to worsening ELIESER  Plan:  -S/P 14d Minocycline course to tx stenotrophomonas PNA, completed 3/27   -IV steroids as above  -Was started on broad spectrum cefepime 3/31 and vanco 4/1  -Stop  minocycline sputum Cx growing steno (3/31) likely represents colonization   -Stop all abx as abx can propogate fungal growth; Bcx x2 (3/31) fungemia    MSK/Skin:  Diagnosis: Midline incision wound with poor wound healing-  -General surgery performed staple removal of the patient's midline incision on 3/12 with some skin signs appreciated at the inferior aspect of the wound without obvious pus drainage. Overnight 3/13, wound dehiscence progressed requiring general surgery reevaluation. Red/brown aspirate noted, fascia intact. Wet-to-dry dressings in place. General surgery following for next Epson wound care.  -Poor wound healing in setting of high-dose steroid therapy.  No  exam no obvious signs of infection at this time.   -S/P wound vac replacement 3/13 as per gen surgery. No active concerns for cellulitis.  Plan:  -Wound VAC management as per general surgery  -Wound care for peritracheostomy wound  -Abdominal wound care as per general surgery  -Routine monitoring     Diagnosis: Critical illness myopathy  -Likely exacerbated by high-dose steroid therapy  Plan:  -Continue to wean steroids as above  -Aggressive PT/OT         Disposition: Critical care    ICU Core Measures     Vented Patient  VAP Bundle  VAP bundle ordered     A: Assess, Prevent, and Manage Pain Has pain been assessed? Yes  Need for changes to pain regimen? NA   B: Both Spontaneous Awakening Trials (SATs) and Spontaneous Breathing Trials (SBTs) Plan to  perform spontaneous awakening trial today? N/A   Plan to perform spontaneous breathing trial today? N/A   Obvious barriers to extubation? NA   C: Choice of Sedation RASS Goal: N/A patient not on sedation  Need for changes to sedation or analgesia regimen? NA   D: Delirium CAM-ICU: Negative   E: Early Mobility  Plan for early mobility? Yes   F: Family Engagement Plan for family engagement today? Yes       Antibiotic Review: Patient on appropriate coverage based on culture data.  and Infectious disease consulted    Review of Invasive Devices:    Ortiz Plan: voiding trial today        Prophylaxis:  VTE Contraindicated secondary to: Plt <50   Stress Ulcer  covered bypantoprazole (PROTONIX) injection 40 mg [[id number]]        Significant 24hr Events       Overnight: Intermittent bleeding noted from urethral cath, given 1U PRBC and 1U PLT.  2/2 Bcx growing fungemia (candida albicans). Started on micofungin     Subjective   Patient denies any pain or discomfort via head nodding.    Review of Systems   Unable to perform ROS: Acuity of condition        Objective                            Vitals I/O      Most Recent Min/Max in 24hrs   Temp 99.5 °F (37.5 °C) Temp  Min: 97.9 °F (36.6 °C)  Max: 99.5 °F (37.5 °C)   Pulse (!) 132 Pulse  Min: 120  Max: 142   Resp (!) 36 Resp  Min: 16  Max: 36   /59 BP  Min: 82/43  Max: 130/73   O2 Sat 92 % SpO2  Min: 92 %  Max: 99 %     Vent: APVcmv  16/450/peep6/40%    LDA  Peripherals (R, L)    Ileostomy RUQ, draining bilious o/p  Wound vac  NGT   Ortiz  Surgical airway , cuffed       Intake/Output Summary (Last 24 hours) at 4/2/2024 0730  Last data filed at 4/2/2024 0618  Gross per 24 hour   Intake 2704.18 ml   Output 1785 ml   Net 919.18 ml       Diet Enteral/Parenteral; Tube Feeding No Oral Diet; Vital 1.5; Continuous; 45; Prosource Protein Liquid - One Packet; OD; Refugio - One Packet; BID; 50; Water; Every 4 hours    Invasive Monitoring             Physical Exam   Physical Exam  Eyes:       Pupils: Pupils are equal, round, and reactive to light.   Skin:     General: Skin is warm and dry.   HENT:      Nose: No epistaxis.      Mouth/Throat:      Mouth: Mucous membranes are moist.   Neck:      Trachea: Tracheostomy present.   Cardiovascular:      Rate and Rhythm: Regular rhythm. Tachycardia present.      Heart sounds: No murmur heard.  Musculoskeletal:         General: No swelling.      Right lower leg: No edema.      Left lower leg: No edema.   Abdominal: General: An ostomy site is present. There is ostomy site.     Palpations: Abdomen is soft.      Tenderness: There is no abdominal tenderness.      Comments: Wound vac   Constitutional:       Appearance: She is ill-appearing.      Interventions: She is intubated. She is not sedated.  Pulmonary:      Effort: Tachypnea present. No accessory muscle usage, respiratory distress or accessory muscle usage. She is intubated.      Breath sounds: Normal breath sounds.   Neurological:      Comments: Intubated   , Lethargic appearing  and Unable to assess strength due to profound weakness in all extremities    Genitourinary/Anorectal:     Comments: Ortiz draining clear yellow urine  Ortiz present.          Diagnostic Studies      EKG: no new  Imaging: no new I have personally reviewed pertinent reports.       Medications:  Scheduled PRN   chlorhexidine, 15 mL, Q12H SARTHAK  lacosamide, 100 mg, Q12H SARTHAK  levalbuterol, 1.25 mg, TID  meropenem, 1,000 mg, Q8H  methylPREDNISolone sodium succinate, 40 mg, Daily  micafungin, 100 mg, Q24H  minocycline, 100 mg, Q12H SARTHAK  nystatin, , BID  pantoprazole, 40 mg, Q12H ASRTHAK  polyethylene glycol, 17 g, Daily  sodium chloride, 2 spray, BID  sodium chloride, 4 mL, TID  topiramate, 50 mg, BID  vancomycin, 1,000 mg, Q24H  venlafaxine, 12.5 mg, TID With Meals      acetaminophen, 650 mg, Q6H PRN  fentaNYL, 25 mcg, Q1H PRN  ondansetron, 4 mg, Q6H PRN  sodium chloride, 1 Application, Q1H PRN       Continuous    insulin regular (HumuLIN  R,NovoLIN R) 1 Units/mL in sodium chloride 0.9 % 100 mL infusion, 0.3-21 Units/hr, Last Rate: 1 Units/hr (04/02/24 0557)           Labs:    CBC    Recent Labs     04/01/24 0430 04/01/24 2013 04/02/24  0450   WBC 5.29 5.73 7.03   HGB 7.3* 6.7* 7.9*   HCT 22.6* 20.7* 24.3*   PLT 36* 45* 66*   BANDSPCT 10*  --   --      BMP    Recent Labs     04/01/24  0430   SODIUM 143   K 3.6   *   CO2 21   AGAP 13   *   CREATININE 1.24   CALCIUM 10.6*             Coags    Recent Labs     04/01/24  1553   INR 1.75*   PTT 35        Additional Electrolytes  Recent Labs     04/01/24  0430   MG 2.5   PHOS 5.5*          Blood Gas    No recent results  Recent Labs     04/01/24  0430   PHVEN 7.249*   BTG1JAD 46.6   PO2VEN 47.6*   QBT6PPL 19.9*   BEVEN -6.9   A0OZXYP 78.6    LFTs  No recent results        Infectious  Recent Labs     04/01/24  1431   PROCALCITONI 7.19*      Bcx x2 (3/31) Yeast, ID panel- candida albicans  Sputum Cx (3/31)- Stenotrophomonas maltophilia    quantiferon gold pending  Cryptococcal pending  Aspergillus pending    COVID ag 3/25 negative  COVID ag 3/27 negative    BAL 3/11   --4+ steno malto., 2+ candida  --Viral Cx neg  --Fungal 4+ candida  CMV neg  PCP smear neg  COVID pcr + on 3/11 Glucose  Recent Labs     04/01/24  0430   GLUC 131               Amdandre Lazcano, DO

## 2024-04-02 NOTE — PHYSICAL THERAPY NOTE
Physical Therapy Cancellation Note    Patient's Name: Carla Hunt       04/02/24 1015   PT Last Visit   PT Visit Date 04/02/24   Note Type   Note Type Cancelled Session   Cancel Reasons Medical status     Spoke w/ RN. Pt lethargic and minimally cooperative. Not appropriate to participate in skilled PT at this time. Will continue to follow per PT POC clinical course allows.    Laura Kern, PT, DPT

## 2024-04-02 NOTE — ASSESSMENT & PLAN NOTE
Resolved   Hgb 7.9  Platelets 68, improving  S/p placement of 18 French three-way Ortiz catheter  Continue manual irrigation as needed  CBI clamped  CT did not demonstrate clot burden within the bladder  Continue to trend labs  No need for urological intervention at this time  Recommend outpatient cystoscopy for gross hematuria once she recovers from this acute episode

## 2024-04-02 NOTE — PLAN OF CARE
Problem: Prexisting or High Potential for Compromised Skin Integrity  Goal: Skin integrity is maintained or improved  Description: INTERVENTIONS:  - Identify patients at risk for skin breakdown  - Assess and monitor skin integrity  - Assess and monitor nutrition and hydration status  - Monitor labs   - Assess for incontinence   - Turn and reposition patient  - Assist with mobility/ambulation  - Relieve pressure over bony prominences  - Avoid friction and shearing  - Provide appropriate hygiene as needed including keeping skin clean and dry  - Evaluate need for skin moisturizer/barrier cream  - Collaborate with interdisciplinary team   - Patient/family teaching  - Consider wound care consult   Outcome: Progressing     Problem: INFECTION - ADULT  Goal: Absence or prevention of progression during hospitalization  Description: INTERVENTIONS:  - Assess and monitor for signs and symptoms of infection  - Monitor lab/diagnostic results  - Monitor all insertion sites, i.e. indwelling lines, tubes, and drains  - Monitor endotracheal if appropriate and nasal secretions for changes in amount and color  - [place] appropriate cooling/warming therapies per order  - Administer medications as ordered  - Instruct and encourage patient and family to use good hand hygiene technique  - Identify and instruct in appropriate isolation precautions for identified infection/condition  Outcome: Progressing     Problem: SAFETY ADULT  Goal: Patient will remain free of falls  Description: INTERVENTIONS:  - Educate patient/family on patient safety including physical limitations  - Instruct patient to call for assistance with activity   - Consult OT/PT to assist with strengthening/mobility   - Keep Call bell within reach  - Keep bed low and locked with side rails adjusted as appropriate  - Keep care items and personal belongings within reach  - Initiate and maintain comfort rounds  - Make Fall Risk Sign visible to staff  - Offer Toileting every  2 Hours, in advance of need  - Initiate/Maintain bed alarm  - Obtain necessary fall risk management equipment: non skid footwear  - Apply yellow socks and bracelet for high fall risk patients  - Consider moving patient to room near nurses station  Outcome: Progressing     Problem: RESPIRATORY - ADULT  Goal: Achieves optimal ventilation and oxygenation  Description: INTERVENTIONS:  - Assess for changes in respiratory status  - Assess for changes in mentation and behavior  - Position to facilitate oxygenation and minimize respiratory effort  - Oxygen administered by appropriate delivery if ordered  - Initiate smoking cessation education as indicated  - Encourage broncho-pulmonary hygiene including cough, deep breathe, Incentive Spirometry  - Assess the need for suctioning and aspirate as needed  - Assess and instruct to report SOB or any respiratory difficulty  - Respiratory Therapy support as indicated  Outcome: Progressing     Problem: SKIN/TISSUE INTEGRITY - ADULT  Goal: Skin Integrity remains intact(Skin Breakdown Prevention)  Description: Assess:  -Perform Flavio assessment every shift   -Clean and moisturize skin every day   -Inspect skin when repositioning, toileting, and assisting with ADLS  -Assess under medical devices such as ronny  every hour   -Assess extremities for adequate circulation and sensation     Bed Management:  -Have minimal linens on bed & keep smooth, unwrinkled  -Change linens as needed when moist or perspiring  -Avoid sitting or lying in one position for more than 2 hours while in bed  -Keep HOB at 45 degrees     Toileting:  -Offer bedside commode  -Assess for incontinence every hour  -Use incontinent care products after each incontinent episode such as moisture barrier cream     Activity:  -Mobilize patient 3 times a day  -Encourage activity and walks on unit  -Encourage or provide ROM exercises   -Turn and reposition patient every 2 Hours  -Use appropriate equipment to lift or move  patient in bed  -Instruct/ Assist with weight shifting every hour when out of bed in chair  -Consider limitation of chair time 2 hour intervals    Skin Care:  -Avoid use of baby powder, tape, friction and shearing, hot water or constrictive clothing  -Relieve pressure over bony prominences using allevyn   -Do not massage red bony areas    Next Steps:  -Teach patient strategies to minimize risks such as weight shifting    -Consider consults to  interdisciplinary teams such as PT  Outcome: Progressing  Goal: Incision(s), wounds(s) or drain site(s) healing without S/S of infection  Description: INTERVENTIONS  - Assess and document dressing, incision, wound bed, drain sites and surrounding tissue  - Provide patient and family education  Outcome: Progressing  Goal: Pressure injury heals and does not worsen  Description: Interventions:  - Implement low air loss mattress or specialty surface (Criteria met)  - Apply silicone foam dressing  - Consider nutrition services referral as needed  Outcome: Progressing     Problem: Potential for Falls  Goal: Patient will remain free of falls  Description: INTERVENTIONS:  - Educate patient/family on patient safety including physical limitations  - Instruct patient to call for assistance with activity   - Consult OT/PT to assist with strengthening/mobility   - Keep Call bell within reach  - Keep bed low and locked with side rails adjusted as appropriate  - Keep care items and personal belongings within reach  - Initiate and maintain comfort rounds  - Make Fall Risk Sign visible to staff  - Offer Toileting every 2 Hours, in advance of need  - Initiate/Maintain bed alarm  - Obtain necessary fall risk management equipment: non skid footwear  - Apply yellow socks and bracelet for high fall risk patients  - Consider moving patient to room near nurses station  Outcome: Progressing     Problem: Nutrition/Hydration-ADULT  Goal: Nutrient/Hydration intake appropriate for improving, restoring or  maintaining nutritional needs  Description: Monitor and assess patient's nutrition/hydration status for malnutrition. Collaborate with interdisciplinary team and initiate plan and interventions as ordered.  Monitor patient's weight and dietary intake as ordered or per policy. Utilize nutrition screening tool and intervene as necessary. Determine patient's food preferences and provide high-protein, high-caloric foods as appropriate.     INTERVENTIONS:  - Monitor oral intake, urinary output, labs, and treatment plans  - Assess nutrition and hydration status and recommend course of action  - Evaluate amount of meals eaten  - Assist patient with eating if necessary   - Allow adequate time for meals  - Recommend/ encourage appropriate diets, oral nutritional supplements, and vitamin/mineral supplements  - Order, calculate, and assess calorie counts as needed  - Recommend, monitor, and adjust tube feedings and TPN/PPN based on assessed needs  - Assess need for intravenous fluids  - Provide specific nutrition/hydration education as appropriate  - Include patient/family/caregiver in decisions related to nutrition  Outcome: Progressing     Problem: COPING  Goal: Pt/Family able to verbalize concerns and demonstrate effective coping strategies  Description: INTERVENTIONS:  - Assist patient/family to identify coping skills, available support systems and cultural and spiritual values  - Provide emotional support, including active listening and acknowledgement of concerns of patient and caregivers  - Reduce environmental stimuli, as able  - Provide patient education  - Assess for spiritual pain/suffering and initiate spiritual care, including notification of Pastoral Care or nat[place] based community as needed  - Assess effectiveness of coping strategies  Outcome: Progressing  Goal: Will report anxiety at manageable levels  Description: INTERVENTIONS:  - Administer medication as ordered  - Teach and encourage coping skills  - Provide  emotional support  - Assess patient/family for anxiety and ability to cope  Outcome: Progressing     Problem: BEHAVIOR  Goal: Pt/Family maintain appropriate behavior and adhere to behavioral management agreement, if implemented  Description: INTERVENTIONS:  - Assess the family dynamic   - Encourage verbalization of thoughts and concerns in a socially appropriate manner  - Assess patient/family's coping skills and non-compliant behavior (including use of illegal substances).  - Utilize positive, consistent limit setting strategies supporting safety of patient, staff and others  - Initiate consult with Case Management, Spiritual Care or other ancillary services as appropriate  - If a patient's/visitor's behavior jeopardizes the safety of the patient, staff, or others, refer to organization procedure.   - Notify Security of behavior or suspected illegal substances which indicate the need for search of the patient and/or belongings  - Encourage participation in the decision making process about a behavioral management agreement; implement if patient meets criteria  Outcome: Progressing     Problem: NEUROSENSORY - ADULT  Goal: Achieves stable or improved neurological status  Description: INTERVENTIONS  - Monitor and report changes in neurological status  - Monitor vital signs such as temperature, blood pressure, glucose, and any other labs ordered   - Initiate measures to prevent increased intracranial pressure  - Monitor for seizure activity and implement precautions if appropriate      Outcome: Progressing  Goal: Achieves maximal functionality and self care  Description: INTERVENTIONS  - Monitor swallowing and airway patency with patient fatigue and changes in neurological status  - Encourage and assist patient to increase activity and self care.   - Encourage visually impaired, hearing impaired and aphasic patients to use assistive/communication devices  Outcome: Progressing     Problem: CARDIOVASCULAR - ADULT  Goal:  Maintains optimal cardiac output and hemodynamic stability  Description: INTERVENTIONS:  - Monitor I/O, vital signs and rhythm  - Monitor for S/S and trends of decreased cardiac output  - Administer and titrate ordered vasoactive medications to optimize hemodynamic stability  - Assess quality of pulses, skin color and temperature  - Assess for signs of decreased coronary artery perfusion  - Instruct patient to report change in severity of symptoms  Outcome: Progressing  Goal: Absence of cardiac dysrhythmias or at baseline rhythm  Description: INTERVENTIONS:  - Continuous cardiac monitoring, vital signs, obtain 12 lead EKG if ordered  - Administer antiarrhythmic and heart rate control medications as ordered  - Monitor electrolytes and administer replacement therapy as ordered  Outcome: Progressing     Problem: GASTROINTESTINAL - ADULT  Goal: Minimal or absence of nausea and/or vomiting  Description: INTERVENTIONS:  - Administer IV fluids if ordered to ensure adequate hydration  - Maintain NPO status until nausea and vomiting are resolved  - Nasogastric tube if ordered  - Administer ordered antiemetic medications as needed  - Provide nonpharmacologic comfort measures as appropriate  - Advance diet as tolerated, if ordered  - Consider nutrition services referral to assist patient with adequate nutrition and appropriate food choices  Outcome: Progressing  Goal: Maintains or returns to baseline bowel function  Description: INTERVENTIONS:  - Assess bowel function  - Encourage oral fluids to ensure adequate hydration  - Administer IV fluids if ordered to ensure adequate hydration  - Administer ordered medications as needed  - Encourage mobilization and activity  - Consider nutritional services referral to assist patient with adequate nutrition and appropriate food choices  Outcome: Progressing  Goal: Maintains adequate nutritional intake  Description: INTERVENTIONS:  - Monitor percentage of each meal consumed  - Identify  factors contributing to decreased intake, treat as appropriate  - Assist with meals as needed  - Monitor I&O, weight, and lab values if indicated  - Obtain nutrition services referral as needed  Outcome: Progressing  Goal: Establish and maintain optimal ostomy function  Description: INTERVENTIONS:  - Assess bowel function  - Encourage oral fluids to ensure adequate hydration  - Administer IV fluids if ordered to ensure adequate hydration   - Administer ordered medications as needed  - Encourage mobilization and activity  - Nutrition services referral to assist patient with appropriate food choices  - Assess stoma site  - Consider wound care consult   Outcome: Progressing  Goal: Oral mucous membranes remain intact  Description: INTERVENTIONS  - Assess oral mucosa and hygiene practices  - Implement preventative oral hygiene regimen  - Implement oral medicated treatments as ordered  - Initiate Nutrition services referral as needed  Outcome: Progressing     Problem: GENITOURINARY - ADULT  Goal: Maintains or returns to baseline urinary function  Description: INTERVENTIONS:  - Assess urinary function  - Encourage oral fluids to ensure adequate hydration if ordered  - Administer IV fluids as ordered to ensure adequate hydration  - Administer ordered medications as needed  - Offer frequent toileting  - Follow urinary retention protocol if ordered  Outcome: Progressing  Goal: Urinary catheter remains patent  Description: INTERVENTIONS:  - Assess patency of urinary catheter  - If patient has a chronic marie, consider changing catheter if non-functioning  - Follow guidelines for intermittent irrigation of non-functioning urinary catheter  Outcome: Progressing     Problem: METABOLIC, FLUID AND ELECTROLYTES - ADULT  Goal: Electrolytes maintained within normal limits  Description: INTERVENTIONS:  - Monitor labs and assess patient for signs and symptoms of electrolyte imbalances  - Administer electrolyte replacement as ordered  -  Monitor response to electrolyte replacements, including repeat lab results as appropriate  - Instruct patient on fluid and nutrition as appropriate  Outcome: Progressing  Goal: Fluid balance maintained  Description: INTERVENTIONS:  - Monitor labs   - Monitor I/O and WT  - Instruct patient on fluid and nutrition as appropriate  - Assess for signs & symptoms of volume excess or deficit  Outcome: Progressing  Goal: Glucose maintained within target range  Description: INTERVENTIONS:  - Monitor Blood Glucose as ordered  - Assess for signs and symptoms of hyperglycemia and hypoglycemia  - Administer ordered medications to maintain glucose within target range  - Assess nutritional intake and initiate nutrition service referral as needed  Outcome: Progressing     Problem: HEMATOLOGIC - ADULT  Goal: Maintains hematologic stability  Description: INTERVENTIONS  - Assess for signs and symptoms of bleeding or hemorrhage  - Monitor labs  - Administer supportive blood products/factors as ordered and appropriate  Outcome: Progressing     Problem: MUSCULOSKELETAL - ADULT  Goal: Maintain or return mobility to safest level of function  Description: INTERVENTIONS:  - Assess patient's ability to carry out ADLs; assess patient's baseline for ADL function and identify physical deficits which impact ability to perform ADLs (bathing, care of mouth/teeth, toileting, grooming, dressing, etc.)  - Assess/evaluate cause of self-care deficits   - Assess range of motion  - Assess patient's mobility  - Assess patient's need for assistive devices and provide as appropriate  - Encourage maximum independence but intervene and supervise when necessary  - Involve family in performance of ADLs  - Assess for home care needs following discharge   - Consider OT consult to assist with ADL evaluation and planning for discharge  - Provide patient education as appropriate  Outcome: Progressing     Problem: MOBILITY - ADULT  Goal: Maintain or return to baseline  ADL function  Description: INTERVENTIONS:  -  Assess patient's ability to carry out ADLs; assess patient's baseline for ADL function and identify physical deficits which impact ability to perform ADLs (bathing, care of mouth/teeth, toileting, grooming, dressing, etc.)  - Assess/evaluate cause of self-care deficits   - Assess range of motion  - Assess patient's mobility; develop plan if impaired  - Assess patient's need for assistive devices and provide as appropriate  - Encourage maximum independence but intervene and supervise when necessary  - Involve family in performance of ADLs  - Assess for home care needs following discharge   - Consider OT consult to assist with ADL evaluation and planning for discharge  - Provide patient education as appropriate  Outcome: Progressing  Goal: Maintains/Returns to pre admission functional level  Description: INTERVENTIONS:  - Perform AM-PAC 6 Click Basic Mobility/ Daily Activity assessment daily.  - Set and communicate daily mobility goal to care team and patient/family/caregiver.   - Collaborate with rehabilitation services on mobility goals if consulted  - Out of bed for toileting  - Record patient progress and toleration of activity level   Outcome: Progressing

## 2024-04-02 NOTE — ASSESSMENT & PLAN NOTE
S/p ostomy. Surgery monitoring closely, patient had VAC change yesterday for midline wound dehiscence.

## 2024-04-02 NOTE — OCCUPATIONAL THERAPY NOTE
OT CANCEL NOTE    Pt chart reviewed. Attempted to see pt this AM, per RN, pt not medically appropriate for skilled OT services at this time. Will continue to follow pt on caseload and see pt when medically stable and as clinically appropriate.       04/02/24 1237   OT Last Visit   OT Visit Date 04/02/24   Note Type   Note Type Cancelled Session   Cancel Reasons Medical status         Felisha Wilkerson MS, OTR/L

## 2024-04-02 NOTE — CONSULTS
Vancomycin IV Pharmacy-to-Dose Consultation    Carla Hunt is a 59 y.o. female who was receiving Vancomycin IV with management by the Pharmacy Consult service for treatment of Other, Pneumonia (goal -600, trough >10) Ostomy infection.     The patient’s Vancomycin therapy has been discontinued. Thank you for allowing us to take part in this patient's care. Pharmacy will sign-off now; please call or re-consult if there are any questions.      Marixa Pham, PharmD, [facility]  Critical Care Clinical Pharmacist  [phone]

## 2024-04-02 NOTE — PROGRESS NOTES
Progress Note- Carla Hunt 59 y.o. female MRN: [id number]    Unit/Bed#: [facility] 10 Encounter: [id number]      Assessment and Plan:  59-year-old female with history including but not limited to B-cell lymphoma involving bone marrow s/p chemo previously on Rituxan, seizure disorder s/p partial left upper lobe resection, CKD, DM with current prolonged and complicated hospital course after being admitted for encephalopathy, hypoxic respiratory failure in setting of COVID infection.  During the hospital course she had cecal volvulus for which she underwent emergent ex lap with ileocecectomy, end ileostomy on 2/20/2024 complicated with wound dehiscence s/p wound VAC placement.  Has also had intermittent bleeding from the stoma site warranting code [employer] team activation on 3/20/2024, hemostasis achieved by surgical team by addressing small pulsatile bleeding at the 3 o clock aspect of the skin edge.    Since then she has had some intermittent pink/red discharge in the wound VAC but no overt signs of bleeding. On physical exam she is chronically sick appearing female with tracheostomy with ongoing secretions, in situ NG tube with bilious output.  Stoma site under wound VAC.     1.  Acute on chronic anemia  2.  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  Bloody NG tube output    GI team previously following for concern of upper GI bleeding however, scope not performed due to bilious NG tube output.  This morning patient had bloody NG tube output leading to reconsult.  Patient was made NPO.  Hemoglobin has been chronically low requiring 2 units packed red blood cells and 3 units platelets and 1 of FFP over the past week with hemoglobin today of 7.6 from 7.9.    - Continue IV PPI BID  - Keep patient n.p.o.  - Continue to trend hemoglobin  - Maintain 2 large-bore IVs, active type and screen  - Transfuse for hemoglobin less than 7  - Monitor stool output  - Hold tube feeds  - Plan for EGD at bedside later  today  _______________________________________________    Subjective:  Patient seen and examined at bedside.  Patient remains with tracheostomy, in situ NG tube.  NG tube output has remained bilious nonbloody.  Also has hematuria.  Output loose and greenish brown.    Medication Administration - last 24 hours from 04/01/2024 1229 to 04/02/2024 1229         Date/Time Order Dose Route Action Action by     04/02/2024 0841 EDT nystatin (MYCOSTATIN) powder -- Topical Given Hallie Bailey, MARSHA     04/01/2024 1812 EDT nystatin (MYCOSTATIN) powder -- Topical Given Veronica Case, RN     04/02/2024 0841 EDT chlorhexidine (PERIDEX) 0.12 % oral rinse 15 mL 15 mL Mouth/Throat Given Hallie Bailey, RN     04/01/2024 2010 EDT chlorhexidine (PERIDEX) 0.12 % oral rinse 15 mL 15 mL Mouth/Throat Given Elana Tapia, RN     04/02/2024 0802 EDT levalbuterol (XOPENEX) inhalation solution 1.25 mg 1.25 mg Nebulization Given Silvia Mata, RT     04/01/2024 1913 EDT levalbuterol (XOPENEX) inhalation solution 1.25 mg 1.25 mg Nebulization Given Francisco Ramos, RT     04/01/2024 1336 EDT levalbuterol (XOPENEX) inhalation solution 1.25 mg 1.25 mg Nebulization Given Tania Esqueda, RT     04/02/2024 0841 EDT polyethylene glycol (MIRALAX) packet 17 g 17 g Per NG Tube Given Hallie Bailey RN     04/02/2024 0841 EDT topiramate (TOPAMAX) 6 mg/ml oral suspension 50 mg 50 mg Per PEG Tube Given Hallie Bailey, MARSHA     04/01/2024 1810 EDT topiramate (TOPAMAX) 6 mg/ml oral suspension 50 mg 50 mg Per PEG Tube Given Veronica Case RN     04/02/2024 0802 EDT sodium chloride 3 % inhalation solution 4 mL 4 mL Nebulization Given Silvia Adolfo, RT     04/01/2024 1913 EDT sodium chloride 3 % inhalation solution 4 mL 4 mL Nebulization Given Francisco Ramos, RT     04/01/2024 1336 EDT sodium chloride 3 % inhalation solution 4 mL 4 mL Nebulization Given Tania Esqueda, RT     04/02/2024 0842 EDT venlafaxine (EFFEXOR) tablet 12.5 mg 12.5 mg Oral Given Hallie  MARSHA Bailey     04/01/2024 1811 EDT venlafaxine (EFFEXOR) tablet 12.5 mg 12.5 mg Oral Given Veronica Case RN     04/02/2024 0841 EDT pantoprazole (PROTONIX) injection 40 mg 40 mg Intravenous Given Hallie Bailey RN     04/01/2024 2014 EDT pantoprazole (PROTONIX) injection 40 mg 40 mg Intravenous Given Elana Tapia RN     04/02/2024 0622 EDT fentaNYL injection 25 mcg 25 mcg Intravenous Given Elana Tapia RN     04/01/2024 2303 EDT fentaNYL injection 25 mcg 25 mcg Intravenous Given Elana Tapia RN     04/02/2024 0825 EDT sodium chloride (OCEAN) 0.65 % nasal spray 2 spray 2 spray Each Nare Given Hallie Bailey RN     04/01/2024 2010 EDT sodium chloride (OCEAN) 0.65 % nasal spray 2 spray 2 spray Each Nare Given Elana Tapia RN     04/02/2024 0841 EDT lacosamide (VIMPAT) oral solution 100 mg 100 mg Oral Given Hallie Bailey RN     04/01/2024 2020 EDT lacosamide (VIMPAT) oral solution 100 mg 100 mg Oral Given Elana Tapia RN     04/02/2024 1207 EDT insulin regular (HumuLIN R,NovoLIN R) 1 Units/mL in sodium chloride 0.9 % 100 mL infusion 1 Units/hr Intravenous Rate/Dose Change Hallie Bailey RN     04/02/2024 0815 EDT insulin regular (HumuLIN R,NovoLIN R) 1 Units/mL in sodium chloride 0.9 % 100 mL infusion 0.5 Units/hr Intravenous Rate/Dose Change Hallie Bailey RN     04/02/2024 0557 EDT insulin regular (HumuLIN R,NovoLIN R) 1 Units/mL in sodium chloride 0.9 % 100 mL infusion 1 Units/hr Intravenous Restarted Elana Tapia RN     04/02/2024 0215 EDT insulin regular (HumuLIN R,NovoLIN R) 1 Units/mL in sodium chloride 0.9 % 100 mL infusion 0 Units/hr Intravenous Stopped Elana Tapia RN     04/02/2024 0202 EDT insulin regular (HumuLIN R,NovoLIN R) 1 Units/mL in sodium chloride 0.9 % 100 mL infusion 0.5 Units/hr Intravenous Restarted Elana Tapia RN     04/02/2024 0002 EDT insulin regular (HumuLIN R,NovoLIN R) 1 Units/mL in sodium chloride 0.9 % 100 mL  infusion 0 Units/hr Intravenous Hold Elana Tapia RN     04/01/2024 2202 EDT insulin regular (HumuLIN R,NovoLIN R) 1 Units/mL in sodium chloride 0.9 % 100 mL infusion 0 Units/hr Intravenous Hold Elana Tapia RN     04/01/2024 2010 EDT insulin regular (HumuLIN R,NovoLIN R) 1 Units/mL in sodium chloride 0.9 % 100 mL infusion 0.5 Units/hr Intravenous Rate/Dose Change Elana Tapia RN     04/01/2024 1609 EDT insulin regular (HumuLIN R,NovoLIN R) 1 Units/mL in sodium chloride 0.9 % 100 mL infusion 4 Units/hr Intravenous Rate/Dose Change Hallie Bailey RN     04/01/2024 1410 EDT insulin regular (HumuLIN R,NovoLIN R) 1 Units/mL in sodium chloride 0.9 % 100 mL infusion 2 Units/hr Intravenous Rate/Dose Change Veronica Case RN     04/02/2024 0841 EDT methylPREDNISolone sodium succinate (Solu-MEDROL) injection 40 mg 40 mg Intravenous Given Hallie Bailey RN     04/01/2024 1828 EDT ceFEPime (MAXIPIME) 2,000 mg in dextrose 5 % 50 mL IVPB 0 mg Intravenous Stopped Veronica Case RN     04/01/2024 1735 EDT multi-electrolyte (ISOLYTE-S PH 7.4) bolus 500 mL 0 mL Intravenous Stopped Hallie Bailey RN     04/01/2024 1322 EDT multi-electrolyte (ISOLYTE-S PH 7.4) bolus 500 mL 500 mL Intravenous New Bag Veronica Case RN     04/02/2024 0002 EDT vancomycin (VANCOCIN) IVPB (premix in dextrose) 1,000 mg 200 mL 0 mg Intravenous Stopped Elana Tapia RN     04/01/2024 2217 EDT vancomycin (VANCOCIN) IVPB (premix in dextrose) 1,000 mg 200 mL 1,000 mg Intravenous New Bag Elana Tapia RN     04/02/2024 0618 EDT ceFEPime (MAXIPIME) 2,000 mg in dextrose 5 % 50 mL IVPB 0 mg Intravenous Stopped Elana Tapia RN     04/02/2024 0443 EDT ceFEPime (MAXIPIME) 2,000 mg in dextrose 5 % 50 mL IVPB 2,000 mg Intravenous New Bag Elana Tapia RN     04/01/2024 1817 EDT ceFEPime (MAXIPIME) 2,000 mg in dextrose 5 % 50 mL IVPB 2,000 mg Intravenous New Bag Veronica Case RN     04/01/2024 1308 EDT iohexol  "(OMNIPAQUE) 350 MG/ML injection (MULTI-DOSE) 100 mL 100 mL Intravenous Given Rubia Lucioniyah     04/01/2024 2330 EDT micafungin (MYCAMINE) 100 mg in sodium chloride 0.9 % 100 mL IVPB 0 mg Intravenous Stopped Elana Tapia RN     04/01/2024 2203 EDT micafungin (MYCAMINE) 100 mg in sodium chloride 0.9 % 100 mL IVPB 100 mg Intravenous New Bag Elana Tapia RN     04/02/2024 0844 EDT minocycline (DYNACIN) tablet 100 mg 100 mg Oral Given Hallie Bailey RN     04/02/2024 1025 EDT fluconazole (DIFLUCAN) (HIGH DOSE) IVPB 800 mg 800 mg Intravenous New Bag Hallie Bailey RN     04/02/2024 1224 EDT multi-electrolyte (ISOLYTE-S PH 7.4) bolus 1,000 mL 1,000 mL Intravenous New Bag Hallie Bailey RN            Objective:     Vitals: Blood pressure 118/76, pulse (!) 128, temperature 99.1 °F (37.3 °C), resp. rate (!) 30, height 5' 8\" (1.727 m), weight 73 kg (160 lb 15 oz), SpO2 99%.,Body mass index is 24.47 kg/m².      Intake/Output Summary (Last 24 hours) at 4/2/2024 1229  Last data filed at 4/2/2024 1211  Gross per 24 hour   Intake 1872.26 ml   Output 1575 ml   Net 297.26 ml       Physical Exam:   General Appearance: Lethargic, not responding to commands, in no acute distress  Abdomen: Soft, non-tender, non-distended; bowel sounds normal; no masses or no organomegaly, wound VAC in place, ostomy in place    Invasive Devices       Peripheral Intravenous Line  Duration             Long-Dwell Peripheral IV (Midline) 03/08/24 Left Cephalic Vein 24 days    Peripheral IV 03/30/24 Right Antecubital 3 days    Peripheral IV 04/01/24 Right;Ventral (anterior) Forearm <1 day              Drain  Duration             Ileostomy RUQ 41 days    NG/OG/Enteral Tube Nasogastric 14 Fr Right nare 28 days    Urethral Catheter Three way 18 Fr. 1 day              Airway  Duration             Surgical Airway Shiley Cuffed 21 days                    Lab Results:  No results displayed because visit has over 200 results.          Imaging " Studies: I have personally reviewed pertinent imaging studies.

## 2024-04-02 NOTE — PROCEDURES
Procedural Sedation    Date/Time: 4/2/2024 2:28 PM    Performed by: Nigel Escobar DO  Authorized by: Nigel Escobar DO    Immediate pre-procedure details:     Reviewed: vital signs and NPO status      Verified: bag valve mask available, emergency equipment available, intubation equipment available, IV patency confirmed, oxygen available and suction available    Procedure details (see MAR for exact dosages):     Sedation start time:  4/2/2024 1:24 PM    Preoxygenation: Trache to vent.    Sedation:  Propofol (& versed)    Analgesia:  Fentanyl    Intra-procedure monitoring:  Blood pressure monitoring, frequent LOC assessments, frequent vital sign checks, continuous pulse oximetry, cardiac monitor and continuous capnometry    Intra-procedure events: none      Sedation end time:  4/2/2024 2:30 PM    Total sedation time (minutes):  66  Post-procedure details:     Attendance: Constant attendance by certified staff until patient recovered      Recovery: Patient returned to pre-procedure baseline      Patient stable for discharge: will remain in MICU.      Patient tolerance:  Tolerated well, no immediate complications  Comments:      Received fentanyl 100mg, versed 2 mg, propofol 120mg. Consent for anesthesia in chart.

## 2024-04-02 NOTE — PROGRESS NOTES
Progress Note - Urology  Carla Hunt 1965, 59 y.o. female MRN: [id number]    Unit/Bed#: [facility] 10 Encounter: [id number]    Gross hematuria  Assessment & Plan  Hgb 7.6  Platelets 64, improving  S/p placement of 18 [place] three-way Ortiz catheter  Continue manual irrigation  Begin CBI and titrate to keep urine light pink color  CT did not demonstrate clot burden within the bladder  Continue to monitor urine output and color  Continue to trend labs  No need for urological intervention at this time  Recommend outpatient cystoscopy for gross hematuria once she recovers from this acute episode    Urinary retention  Assessment & Plan  S/p PE Ortiz catheter insertion 3/27/2024 urinary retention  Voiding trial and outpatient/rehab setting when medically stabilized  Monitor I&O  Creat 1.44           * Acute respiratory failure with hypoxia (HCC)  Assessment & Plan  Patient has tracheostomy on ventilator  Management per critical care/pulmonary team            Subjective: Critically ill patient currently intubated with history of acute hypoxic respiratory failure now status post tracheostomy on ventilator, stenotrophomonas Monia, Candida pneumonia, candidemia, persistent COVID PNA, uremia, upper GI bleed, acute blood loss anemia, hemorrhagic shock, pancytopenia, severe encephalopathy, volvulus post hemicolectomy status post cecal ostomy with stomal retraction, B-cell lymphoma  Rituximab, steroid-induced hyperglycemia, minimal SAH POA, seizure disorder, thrombophlebitis upper extremity, migraine status post temporal lobectomy, hypogammaglobulinemia, critical illness myopathy, hypercalcemia, vaginal bleeding, hyperthyroidism with multinodular goiter, hematuria status post Ortiz catheter placement.     at bedside.  Reports patient had smoked very briefly in her 20s.  No personal or family history of kidney cancer or bladder cancer.      Review of Systems   Unable to perform ROS: Intubated       Objective:  Nursing  "Alfred: Hallie  Vitals: Blood pressure 118/76, pulse (!) 128, temperature 99.1 °F (37.3 °C), resp. rate (!) 30, height 5' 8\" (1.727 m), weight 73 kg (160 lb 15 oz), SpO2 99%.,Body mass index is 24.47 kg/m².  INS & OUTS:  I/O last 24 hours:  In: 3113.1 [I.V.:623.1; Blood:885; NG/GT:605; IV Piggyback:1000]  Out: 2015 [Urine:1550; Emesis/NG output:120; Stool:345]    Physical Exam  Constitutional:       Interventions: She is sedated and intubated.   Cardiovascular:      Rate and Rhythm: Tachycardia present.   Pulmonary:      Effort: She is intubated.   Abdominal:      Palpations: Abdomen is soft.   Genitourinary:     Comments: Ortiz catheter draining punch to roni-colored urine.  Manually irrigated for small amount of clots        Imaging:  CT ABDOMEN AND PELVIS WITH IV CONTRAST     INDICATION: blood clots in urine, currently doing cbi.     COMPARISON: CT chest/abdomen/pelvis dated 3/10/2024.     TECHNIQUE: CT examination of the abdomen and pelvis was performed. Multiplanar 2D reformatted images were created from the source data.     This examination, like all CT scans performed in the [facility], was performed utilizing techniques to minimize radiation dose exposure, including the use of iterative reconstruction and automated exposure control. Radiation dose length   product (DLP) for this visit: 705.77 mGy-cm     IV Contrast: 100 mL of iohexol (OMNIPAQUE)  Enteric Contrast: Not administered.     FINDINGS:     ABDOMEN     LOWER CHEST: Persistent bilateral lower lobe airspace consolidation.     LIVER/BILIARY TREE: Unremarkable.     GALLBLADDER: No calcified gallstones. No pericholecystic inflammatory change.     SPLEEN: Unremarkable.     PANCREAS: Unremarkable.     ADRENAL GLANDS: Unremarkable.     KIDNEYS/URETERS: Numerous bilateral renal cysts measuring up to 2.3 cm on the left and other subcentimeter hypodensities which are too small to characterize. Numerous bilateral renal calculi/calcifications " redemonstrated probably due to medullary sponge   kidney. No hydronephrosis.     STOMACH AND BOWEL: No evidence for bowel obstruction. The patient is again noted to be status post ileocolectomy with right lower quadrant ileostomy. Associated edema and small foci of subcutaneous emphysema consistent with postsurgical change are not   significantly changed. No focal drainable fluid collection is identified.     APPENDIX: No findings to suggest appendicitis.     ABDOMINOPELVIC CAVITY: Trace ascites. No pneumoperitoneum. No lymphadenopathy.     VESSELS: Unremarkable for patient's age.     PELVIS     REPRODUCTIVE ORGANS: Unremarkable for patient's age.     URINARY BLADDER: Ortiz catheter within the urinary bladder.     ABDOMINAL WALL/INGUINAL REGIONS: Unremarkable.     BONES: No acute fracture or suspicious osseous lesion.     IMPRESSION:     Stable postsurgical changes status post ileocolectomy with right lower quadrant ileostomy with persistent associated inflammatory changes and subcutaneous emphysema. No focal drainable fluid collection identified. Trace ascites.     Persistent bilateral lower lobe atelectasis.        Workstation performed: [id number]  Imaging reviewed - both report and images personally reviewed.     Labs:  Recent Labs     03/31/24 0044 03/31/24  0545 04/01/24  0430 04/01/24 2013 04/02/24  0450 04/02/24  0837   WBC 2.18* 2.47* 5.29 5.73 7.03 7.75       Recent Labs     03/31/24  0044 03/31/24  0545 03/31/24  1235 04/01/24  0430 04/01/24 2013 04/02/24  0450 04/02/24  0837   HGB 6.5* 6.1* 7.7* 7.3* 6.7* 7.9* 7.6*     Recent Labs     03/31/24  0044 03/31/24  0545 03/31/24  1235 04/01/24  0430 04/01/24 2013 04/02/24  0450 04/02/24  0837   HCT 20.6* 19.5* 24.1* 22.6* 20.7* 24.3* 23.1*     Recent Labs     03/31/24  0545 04/01/24  0430 04/02/24  0802   CREATININE 0.96 1.24 1.44*     Lab Results   Component Value Date    HGB 7.6 (L) 04/02/2024    HCT 23.1 (L) 04/02/2024    WBC 7.75 04/02/2024    PLT 64  (L) 2024     Lab Results   Component Value Date     2017    K 4.4 2024     (H) 2024    CO2 20 (L) 2024     (H) 2024    CREATININE 1.44 (H) 2024    CALCIUM 10.3 (H) 2024    GLUCOSE 118 2024     Urinalysis: Innumerable WBC's per HPF, innumerable RBC's per HPF, and no + bacteria  Urine Culture: Not completed    History:    Past Medical History:   Diagnosis Date    Hx of hypercalcemia     Lymphoma (HCC) 2021    Parathyroid disease (HCC)     Seizures (HCC)     Situational depression     24 yr old son  from drug overdose     Past Surgical History:   Procedure Laterality Date    CRANIOTOMY FOR TEMPORAL LOBECTOMY Left     HI LAPS ABD PRTM&OMENTUM DX W/WO SPEC BR/WA SPX N/A 2024    Procedure: LAPAROSCOPY DIAGNOSTIC,EXPLORATORY LAPAROTOMY, RIGHT KARY COLECTOMY,ILEOSTOMY, MUCUS FISTULA;  Surgeon: Lauryn Ullrich, DO;  Location:  MAIN OR;  Service: General     Family History   Problem Relation Age of Onset    Diabetes Mother     Cancer Father      Social History     Socioeconomic History    Marital status: /Civil Union     Spouse name: None    Number of children: None    Years of education: None    Highest education level: None   Occupational History    None   Tobacco Use    Smoking status: Never    Smokeless tobacco: Never   Vaping Use    Vaping status: Never Used   Substance and Sexual Activity    Alcohol use: Not Currently    Drug use: Never    Sexual activity: None   Other Topics Concern    None   Social History Narrative    None     Social Determinants of Health     Financial Resource Strain: Not on file   Food Insecurity: No Food Insecurity (2024)    Hunger Vital Sign     Worried About Running Out of Food in the Last Year: Never true     Ran Out of Food in the Last Year: Never true   Transportation Needs: No Transportation Needs (2024)    PRAPARE - Transportation     Lack of Transportation (Medical): No     Lack of  Transportation (Non-Medical): No   Physical Activity: Not on file   Stress: Not on file   Social Connections: Not on file   Intimate Partner Violence: Not on file   Housing Stability: Low Risk  (1/11/2024)    Housing Stability Vital Sign     Unable to Pay for Housing in the Last Year: No     Number of Places Lived in the Last Year: 1     Unstable Housing in the Last Year: No       The following portions of the patient's history were reviewed and updated as appropriate: allergies, current medications, past family history, past medical history, past social history, past surgical history and problem list    DANYELLE Hansen  Date: 4/2/2024 Time: 1:18 PM

## 2024-04-02 NOTE — RESPIRATORY THERAPY NOTE
RT Ventilator Management Note  Calra Hunt 59 y.o. female MRN: [id number]  Unit/Bed#: [facility] 10 Encounter: [id number]      Daily Screen         3/31/2024  1025 4/1/2024  0723          Patient safety screen outcome:: Passed Failed                Physical Exam:   Assessment Type: Assess only  Bilateral Breath Sounds: Rhonchi  Suction: Trach      Resp Comments: (P) Pt. doing well on APVCMV. No changes throughout the nigh.

## 2024-04-02 NOTE — ASSESSMENT & PLAN NOTE
Primary team working up possible infectious source contributing to decreased wakefulness. Provigil held.  Identified to have fungemia, treatment started.  Unfortunately, Carla remains encephalopathic, not interactive during time of encounter.

## 2024-04-02 NOTE — UTILIZATION REVIEW
Continued Stay Review    Date:   4/2/24                          Current Patient Class:  Inpatient  Current Level of Care:  Critical Care    HPI:59 y.o. female initially admitted on  1/10/24     S/P  trach  3/12    Assessment/Plan:   4/2   Remains on  vent  with trach collar.  Remains on  IV  s/medrol, slow wean.  Continue frequent neurochecks.   Continue delirium /seizure precautions.  Continue  SATINDER   and insulin drip.   Remains on tube feeds.  Peg t ube placement pending  abdominal wound healing.  Monitor ostomy site.   Need hemoglobin > 7.  Needs  aggressive  PT/OT.  Intermittent bleeding noted from  urethral cath.  S/P  1 U PRBC  and 1 U paltelets  D/T  BC  growing fungemia.   Nods head.     Vital Signs:   .5 °F (36.9 °C) 128 Abnormal  29 Abnormal  89/54 Abnormal  68 93 % -- -- -- --    04/02/24 0804 -- -- -- -- -- 93 % -- -- -- --   04/02/24 0800 -- 130 Abnormal  26 Abnormal  -- -- 93 % -- -- -- --   04/02/24 0755 -- 75 39 Abnormal  -- -- 90 % -- -- -- --   04/02/24 0700 -- 133 Abnormal  39 Abnormal  106/59 77 93 % -- -- -- --   04/02/24 0600 -- 132 Abnormal  36 Abnormal  112/59 79 92 % -- -- -- --   04/02/24 0500 -- 129 Abnormal  35 Abnormal  111/61 80 95 % -- -- -- --   04/02/24 0421 -- -- -- -- -- 97 % -- -- -- --   04/02/24 0400 99.5 °F (37.5 °C) 125 Abnormal  29 Abnormal  111/58 78 95 % -- -- Ventilator --   04/02/24 0300 -- 127 Abnormal  29 Abnormal  106/56 76 98 % -- -- -- --   04/02/24 0219 98.8 °F (37.1 °C) 126 Abnormal  26 Abnormal  119/65 87 96 % -- -- -- --   04/02/24 0200 99.4 °F (37.4 °C) 125 Abnormal  26 Abnormal  130/73 95 99 % -- -- -- --   04/02/24 0123 98.1 °F (36.7 °C) 124 Abnormal  26 Abnormal  100/57 -- 97 % -- -- Ventilator --   04/02/24 0112 98.9 °F (37.2 °C) 124 Abnormal  16 100/57 75 98 % -- -- -- --   04/02/24 0100 98.2 °F (36.8 °C) 125 Abnormal  27 Abnormal  109/57 77 98 % -- -- Ventilator --   04/02/24 0052 98 °F (36.7 °C) 124 Abnormal  25 Abnormal  -- 74 98 % -- -- -- --   04/02/24  0049 -- -- -- -- -- 97 % -- -- -- --   04/02/24 0041 98.8 °F (37.1 °C) 123 Abnormal  26 Abnormal  105/56 -- 97 % -- -- -- --       Pertinent Labs/Diagnostic Results:       Results from last 7 days   Lab Units 04/02/24  0837 04/02/24  0450 04/01/24 2013 04/01/24  0430 03/31/24  1235 03/31/24  0545 03/29/24  0556 03/28/24  0532   WBC Thousand/uL 7.75 7.03 5.73 5.29  --  2.47*   < > 1.07*   HEMOGLOBIN g/dL 7.6* 7.9* 6.7* 7.3* 7.7* 6.1*   < > 7.8*   HEMATOCRIT % 23.1* 24.3* 20.7* 22.6* 24.1* 19.5*   < > 24.6*   PLATELETS Thousands/uL 64* 66* 45* 36*  --  51*   < > 34*   TOTAL NEUT ABS Thousand/uL  --   --   --   --   --   --   --  0.88*   BANDS PCT %  --  41*  --  10*  --  37*   < > 21*    < > = values in this interval not displayed.         Results from last 7 days   Lab Units 04/01/24 0430 03/31/24  0545 03/30/24  0454 03/29/24  1752 03/29/24  0556 03/28/24  0532   SODIUM mmol/L 143 141 141 139 137 145   POTASSIUM mmol/L 3.6 3.7 3.5 3.6 3.5 3.1*   CHLORIDE mmol/L 109* 108 112* 109* 110* 114*   CO2 mmol/L 21 21 18* 19* 18* 19*   ANION GAP mmol/L 13 12 11 11 9 12   BUN mg/dL 104* 78* 61* 52* 56* 62*   CREATININE mg/dL 1.24 0.96 0.70 0.65 0.69 0.83   EGFR ml/min/1.73sq m 47 65 95 98 96 77   CALCIUM mg/dL 10.6* 10.2 10.0 9.8 9.5 9.7   MAGNESIUM mg/dL 2.5 2.6 2.8*  --  1.7* 2.0   PHOSPHORUS mg/dL 5.5* 5.5* 4.2  --  3.2 3.9       Results from last 7 days   Lab Units 04/02/24  0810 04/02/24  0556 04/02/24  0355 04/02/24  0202 04/01/24  2349 04/01/24  2202 04/01/24  2135 04/01/24  2009 04/01/24  1809 04/01/24  1552 04/01/24  1408 04/01/24  1212   POC GLUCOSE mg/dl 128 143* 115 119 108 84 93 96 117 140 127 109     Results from last 7 days   Lab Units 04/01/24  0430 03/31/24  0545 03/30/24  0454 03/29/24  1752 03/29/24  0556 03/28/24  0532 03/27/24  0545 03/26/24  2046 03/26/24  1438   GLUCOSE RANDOM mg/dL 131 105 147* 134 257* 201* 263* 179* 202*              Results from last 7 days   Lab Units 04/01/24  0430 03/31/24  1235  03/31/24  0854   PH NICA  7.249* 7.305 7.238*   PCO2 NICA mm Hg 46.6 40.0* 52.5*   PO2 NICA mm Hg 47.6* 75.4* 67.7*   HCO3 NICA mmol/L 19.9* 19.5* 21.9*   BASE EXC NICA mmol/L -6.9 -6.4 -5.2   O2 CONTENT NICA ml/dL 9.7 11.1 8.6   O2 HGB, VENOUS % 78.6 93.8* 89.9*                   Results from last 7 days   Lab Units 04/01/24  1553   PROTIME seconds 20.1*   INR  1.75*   PTT seconds 35     Results from last 7 days   Lab Units 04/01/24  1431   TSH 3RD GENERATON uIU/mL 0.039*     Results from last 7 days   Lab Units 04/01/24  1431 03/31/24  0545   PROCALCITONIN ng/ml 7.19* 6.28*                             Results from last 7 days   Lab Units 04/02/24  0555 04/01/24  0555 03/31/24  0555 03/27/24  0555   UNIT PRODUCT CODE  [id number]  [id number]  [id number]   UNIT NUMBER  [id number]  [id number]  [id number]   UNITABO  O  A  O A A A   UNITRH  POS  NEG  POS NEG POS POS   CROSSMATCH  Compatible Compatible  --   --    UNIT DISPENSE STATUS  Presumed Trans  Presumed Trans  Presumed Trans Presumed Trans Presumed Trans Presumed Trans   UNIT PRODUCT VOL mL 253  350  300 350 300 300                             Results from last 7 days   Lab Units 04/02/24  0441   VANCOMYCIN RM ug/mL 38.3*       Medications:   Scheduled Medications:  chlorhexidine, 15 mL, Mouth/Throat, Q12H SARTHAK  fluconazole, 800 mg, Intravenous, Q24H  [START ON 4/3/2024] fluconazole, 400 mg, Intravenous, Q24H  lacosamide, 100 mg, Oral, Q12H SARTHAK  levalbuterol, 1.25 mg, Nebulization, TID  methylPREDNISolone sodium succinate, 40 mg, Intravenous, Daily  micafungin, 100 mg, Intravenous, Q24H  minocycline, 200 mg, Oral, Q12H SARTHAK  nystatin, , Topical, BID  pantoprazole, 40 mg, Intravenous, Q12H SARTHAK  polyethylene glycol, 17 g, Per NG Tube, Daily  sodium chloride, 2 spray, Each Nare, BID  sodium chloride, 4 mL, Nebulization, TID  topiramate, 50 mg, Per PEG Tube, BID  venlafaxine, 12.5  mg, Oral, TID With Meals      Continuous IV Infusions:  insulin regular (HumuLIN R,NovoLIN R) 1 Units/mL in sodium chloride 0.9 % 100 mL infusion, 0.3-21 Units/hr, Intravenous, Titrated      PRN Meds:  acetaminophen, 650 mg, Oral, Q6H PRN  fentaNYL, 25 mcg, Intravenous, Q1H PRN  ondansetron, 4 mg, Intravenous, Q6H PRN  sodium chloride, 1 Application, Nasal, Q1H PRN        Discharge Plan:   TBD    Network Utilization Review Department  ATTENTION: Please call with any questions or concerns to [phone] and carefully listen to the prompts so that you are directed to the right person. All voicemails are confidential.   For Discharge needs, contact Care Management DC Support Team at [phone] opt. [phone]  Send all requests for admission clinical reviews, approved or denied determinations and any other requests to dedicated fax number below belonging to the [place] where the patient is receiving treatment. List of dedicated fax numbers for the Facilities:  FACILITY NAME UR FAX NUMBER   ADMISSION DENIALS (Administrative/Medical Necessity) [phone]   DISCHARGE SUPPORT TEAM (NETWORK) [phone]   PARENT CHILD HEALTH (Maternity/NICU/Pediatrics) [phone]   [facility] [phone]   [facility] [phone]   [facility] [phone]   [facility] [phone]   [facility] [phone]   [facility] [phone]   [facility] [phone]   [facility] [phone]   [facility] [phone]   [facility] [phone]   [facility] [phone]   [facility] [phone]

## 2024-04-02 NOTE — ASSESSMENT & PLAN NOTE
Admitted, severe COVID course complicated by incidences of pneumonia  Patient was re-intubated 3/11 with subsequent bedside trach on 3/12.   Very careful steroid wean  per critical care team.   Patient back on ventilator support, previously on trach collar and using PMV

## 2024-04-02 NOTE — PLAN OF CARE
Problem: Prexisting or High Potential for Compromised Skin Integrity  Goal: Skin integrity is maintained or improved  Description: INTERVENTIONS:  - Identify patients at risk for skin breakdown  - Assess and monitor skin integrity  - Assess and monitor nutrition and hydration status  - Monitor labs   - Assess for incontinence   - Turn and reposition patient  - Assist with mobility/ambulation  - Relieve pressure over bony prominences  - Avoid friction and shearing  - Provide appropriate hygiene as needed including keeping skin clean and dry  - Evaluate need for skin moisturizer/barrier cream  - Collaborate with interdisciplinary team   - Patient/family teaching  - Consider wound care consult   Outcome: Progressing     Problem: Nutrition/Hydration-ADULT  Goal: Nutrient/Hydration intake appropriate for improving, restoring or maintaining nutritional needs  Description: Monitor and assess patient's nutrition/hydration status for malnutrition. Collaborate with interdisciplinary team and initiate plan and interventions as ordered.  Monitor patient's weight and dietary intake as ordered or per policy. Utilize nutrition screening tool and intervene as necessary. Determine patient's food preferences and provide high-protein, high-caloric foods as appropriate.     INTERVENTIONS:  - Monitor oral intake, urinary output, labs, and treatment plans  - Assess nutrition and hydration status and recommend course of action  - Evaluate amount of meals eaten  - Assist patient with eating if necessary   - Allow adequate time for meals  - Recommend/ encourage appropriate diets, oral nutritional supplements, and vitamin/mineral supplements  - Order, calculate, and assess calorie counts as needed  - Recommend, monitor, and adjust tube feedings and TPN/PPN based on assessed needs  - Assess need for intravenous fluids  - Provide specific nutrition/hydration education as appropriate  - Include patient/family/caregiver in decisions related to  nutrition  Outcome: Progressing     Problem: INFECTION - ADULT  Goal: Absence or prevention of progression during hospitalization  Description: INTERVENTIONS:  - Assess and monitor for signs and symptoms of infection  - Monitor lab/diagnostic results  - Monitor all insertion sites, i.e. indwelling lines, tubes, and drains  - Monitor endotracheal if appropriate and nasal secretions for changes in amount and color  - [place] appropriate cooling/warming therapies per order  - Administer medications as ordered  - Instruct and encourage patient and family to use good hand hygiene technique  - Identify and instruct in appropriate isolation precautions for identified infection/condition  Outcome: Progressing     Problem: SAFETY ADULT  Goal: Patient will remain free of falls  Description: INTERVENTIONS:  - Educate patient/family on patient safety including physical limitations  - Instruct patient to call for assistance with activity   - Consult OT/PT to assist with strengthening/mobility   - Keep Call bell within reach  - Keep bed low and locked with side rails adjusted as appropriate  - Keep care items and personal belongings within reach  - Initiate and maintain comfort rounds  - Make Fall Risk Sign visible to staff  - Offer Toileting every 2 Hours, in advance of need  - Initiate/Maintain bed alarm  - Obtain necessary fall risk management equipment: non skid footwear  - Apply yellow socks and bracelet for high fall risk patients  - Consider moving patient to room near nurses station  Outcome: Progressing     Problem: RESPIRATORY - ADULT  Goal: Achieves optimal ventilation and oxygenation  Description: INTERVENTIONS:  - Assess for changes in respiratory status  - Assess for changes in mentation and behavior  - Position to facilitate oxygenation and minimize respiratory effort  - Oxygen administered by appropriate delivery if ordered  - Initiate smoking cessation education as indicated  - Encourage broncho-pulmonary  hygiene including cough, deep breathe, Incentive Spirometry  - Assess the need for suctioning and aspirate as needed  - Assess and instruct to report SOB or any respiratory difficulty  - Respiratory Therapy support as indicated  Outcome: Progressing     Problem: SKIN/TISSUE INTEGRITY - ADULT  Goal: Skin Integrity remains intact(Skin Breakdown Prevention)  Description: Assess:  -Perform Flavio assessment every shift   -Clean and moisturize skin every day   -Inspect skin when repositioning, toileting, and assisting with ADLS  -Assess under medical devices such as ronny  every hour   -Assess extremities for adequate circulation and sensation     Bed Management:  -Have minimal linens on bed & keep smooth, unwrinkled  -Change linens as needed when moist or perspiring  -Avoid sitting or lying in one position for more than 2 hours while in bed  -Keep HOB at 45 degrees     Toileting:  -Offer bedside commode  -Assess for incontinence every hour  -Use incontinent care products after each incontinent episode such as moisture barrier cream     Activity:  -Mobilize patient 3 times a day  -Encourage activity and walks on unit  -Encourage or provide ROM exercises   -Turn and reposition patient every 2 Hours  -Use appropriate equipment to lift or move patient in bed  -Instruct/ Assist with weight shifting every hour when out of bed in chair  -Consider limitation of chair time 2 hour intervals    Skin Care:  -Avoid use of baby powder, tape, friction and shearing, hot water or constrictive clothing  -Relieve pressure over bony prominences using allevyn   -Do not massage red bony areas    Next Steps:  -Teach patient strategies to minimize risks such as weight shifting    -Consider consults to  interdisciplinary teams such as PT  Outcome: Progressing  Goal: Incision(s), wounds(s) or drain site(s) healing without S/S of infection  Description: INTERVENTIONS  - Assess and document dressing, incision, wound bed, drain sites and  surrounding tissue  - Provide patient and family education  - Perform skin care/dressing changes   Outcome: Progressing  Goal: Pressure injury heals and does not worsen  Description: Interventions:  - Implement low air loss mattress or specialty surface (Criteria met)  - Apply silicone foam dressing  - Instruct/assist with weight shifting every 120 minutes when in chair   - Limit chair time to 2 hour intervals  - Use special pressure reducing interventions when in chair   - Apply fecal or urinary incontinence containment device   - Perform passive or active ROM   - Turn and reposition patient & offload bony prominences every 2 hours   - Utilize friction reducing device or surface for transfers   - Consider consults to  interdisciplinary teams  - Use incontinent care products after each incontinent episode  - Consider nutrition services referral as needed  Outcome: Progressing     Problem: NEUROSENSORY - ADULT  Goal: Achieves stable or improved neurological status  Description: INTERVENTIONS  - Monitor and report changes in neurological status  - Monitor vital signs such as temperature, blood pressure, glucose, and any other labs ordered   - Initiate measures to prevent increased intracranial pressure  - Monitor for seizure activity and implement precautions if appropriate      Outcome: Progressing  Goal: Achieves maximal functionality and self care  Description: INTERVENTIONS  - Monitor swallowing and airway patency with patient fatigue and changes in neurological status  - Encourage and assist patient to increase activity and self care.   - Encourage visually impaired, hearing impaired and aphasic patients to use assistive/communication devices  Outcome: Progressing     Problem: CARDIOVASCULAR - ADULT  Goal: Maintains optimal cardiac output and hemodynamic stability  Description: INTERVENTIONS:  - Monitor I/O, vital signs and rhythm  - Monitor for S/S and trends of decreased cardiac output  - Administer and titrate  ordered vasoactive medications to optimize hemodynamic stability  - Assess quality of pulses, skin color and temperature  - Assess for signs of decreased coronary artery perfusion  - Instruct patient to report change in severity of symptoms  Outcome: Progressing  Goal: Absence of cardiac dysrhythmias or at baseline rhythm  Description: INTERVENTIONS:  - Continuous cardiac monitoring, vital signs, obtain 12 lead EKG if ordered  - Administer antiarrhythmic and heart rate control medications as ordered  - Monitor electrolytes and administer replacement therapy as ordered  Outcome: Progressing     Problem: GASTROINTESTINAL - ADULT  Goal: Minimal or absence of nausea and/or vomiting  Description: INTERVENTIONS:  - Administer IV fluids if ordered to ensure adequate hydration  - Maintain NPO status until nausea and vomiting are resolved  - Nasogastric tube if ordered  - Administer ordered antiemetic medications as needed  - Provide nonpharmacologic comfort measures as appropriate  - Advance diet as tolerated, if ordered  - Consider nutrition services referral to assist patient with adequate nutrition and appropriate food choices  Outcome: Progressing  Goal: Maintains or returns to baseline bowel function  Description: INTERVENTIONS:  - Assess bowel function  - Encourage oral fluids to ensure adequate hydration  - Administer IV fluids if ordered to ensure adequate hydration  - Administer ordered medications as needed  - Encourage mobilization and activity  - Consider nutritional services referral to assist patient with adequate nutrition and appropriate food choices  Outcome: Progressing  Goal: Maintains adequate nutritional intake  Description: INTERVENTIONS:  - Monitor percentage of each meal consumed  - Identify factors contributing to decreased intake, treat as appropriate  - Assist with meals as needed  - Monitor I&O, weight, and lab values if indicated  - Obtain nutrition services referral as needed  Outcome:  Progressing  Goal: Establish and maintain optimal ostomy function  Description: INTERVENTIONS:  - Assess bowel function  - Encourage oral fluids to ensure adequate hydration  - Administer IV fluids if ordered to ensure adequate hydration   - Administer ordered medications as needed  - Encourage mobilization and activity  - Nutrition services referral to assist patient with appropriate food choices  - Assess stoma site  - Consider wound care consult   Outcome: Progressing  Goal: Oral mucous membranes remain intact  Description: INTERVENTIONS  - Assess oral mucosa and hygiene practices  - Implement preventative oral hygiene regimen  - Implement oral medicated treatments as ordered  - Initiate Nutrition services referral as needed  Outcome: Progressing     Problem: GENITOURINARY - ADULT  Goal: Maintains or returns to baseline urinary function  Description: INTERVENTIONS:  - Assess urinary function  - Encourage oral fluids to ensure adequate hydration if ordered  - Administer IV fluids as ordered to ensure adequate hydration  - Administer ordered medications as needed  - Offer frequent toileting  - Follow urinary retention protocol if ordered  Outcome: Progressing  Goal: Urinary catheter remains patent  Description: INTERVENTIONS:  - Assess patency of urinary catheter  - If patient has a chronic marie, consider changing catheter if non-functioning  - Follow guidelines for intermittent irrigation of non-functioning urinary catheter  Outcome: Progressing     Problem: METABOLIC, FLUID AND ELECTROLYTES - ADULT  Goal: Electrolytes maintained within normal limits  Description: INTERVENTIONS:  - Monitor labs and assess patient for signs and symptoms of electrolyte imbalances  - Administer electrolyte replacement as ordered  - Monitor response to electrolyte replacements, including repeat lab results as appropriate  - Instruct patient on fluid and nutrition as appropriate  Outcome: Progressing  Goal: Fluid balance  maintained  Description: INTERVENTIONS:  - Monitor labs   - Monitor I/O and WT  - Instruct patient on fluid and nutrition as appropriate  - Assess for signs & symptoms of volume excess or deficit  Outcome: Progressing  Goal: Glucose maintained within target range  Description: INTERVENTIONS:  - Monitor Blood Glucose as ordered  - Assess for signs and symptoms of hyperglycemia and hypoglycemia  - Administer ordered medications to maintain glucose within target range  - Assess nutritional intake and initiate nutrition service referral as needed  Outcome: Progressing     Problem: HEMATOLOGIC - ADULT  Goal: Maintains hematologic stability  Description: INTERVENTIONS  - Assess for signs and symptoms of bleeding or hemorrhage  - Monitor labs  - Administer supportive blood products/factors as ordered and appropriate  Outcome: Progressing     Problem: MUSCULOSKELETAL - ADULT  Goal: Maintain or return mobility to safest level of function  Description: INTERVENTIONS:  - Assess patient's ability to carry out ADLs; assess patient's baseline for ADL function and identify physical deficits which impact ability to perform ADLs (bathing, care of mouth/teeth, toileting, grooming, dressing, etc.)  - Assess/evaluate cause of self-care deficits   - Assess range of motion  - Assess patient's mobility  - Assess patient's need for assistive devices and provide as appropriate  - Encourage maximum independence but intervene and supervise when necessary  - Involve family in performance of ADLs  - Assess for home care needs following discharge   - Consider OT consult to assist with ADL evaluation and planning for discharge  - Provide patient education as appropriate  Outcome: Progressing     Problem: COPING  Goal: Pt/Family able to verbalize concerns and demonstrate effective coping strategies  Description: INTERVENTIONS:  - Assist patient/family to identify coping skills, available support systems and cultural and spiritual values  - Provide  emotional support, including active listening and acknowledgement of concerns of patient and caregivers  - Reduce environmental stimuli, as able  - Provide patient education  - Assess for spiritual pain/suffering and initiate spiritual care, including notification of Pastoral Care or [place] based community as needed  - Assess effectiveness of coping strategies  Outcome: Progressing  Goal: Will report anxiety at manageable levels  Description: INTERVENTIONS:  - Administer medication as ordered  - Teach and encourage coping skills  - Provide emotional support  - Assess patient/family for anxiety and ability to cope  Outcome: Progressing     Problem: BEHAVIOR  Goal: Pt/Family maintain appropriate behavior and adhere to behavioral management agreement, if implemented  Description: INTERVENTIONS:  - Assess the family dynamic   - Encourage verbalization of thoughts and concerns in a socially appropriate manner  - Assess patient/family's coping skills and non-compliant behavior (including use of illegal substances).  - Utilize positive, consistent limit setting strategies supporting safety of patient, staff and others  - Initiate consult with Case Management, Spiritual Care or other ancillary services as appropriate  - If a patient's/visitor's behavior jeopardizes the safety of the patient, staff, or others, refer to organization procedure.   - Notify Security of behavior or suspected illegal substances which indicate the need for search of the patient and/or belongings  - Encourage participation in the decision making process about a behavioral management agreement; implement if patient meets criteria  Outcome: Progressing     Problem: Potential for Falls  Goal: Patient will remain free of falls  Description: INTERVENTIONS:  - Educate patient/family on patient safety including physical limitations  - Instruct patient to call for assistance with activity   - Consult OT/PT to assist with strengthening/mobility   - Keep Call  bell within reach  - Keep bed low and locked with side rails adjusted as appropriate  - Keep care items and personal belongings within reach  - Initiate and maintain comfort rounds  - Make Fall Risk Sign visible to staff  - Offer Toileting every 2 Hours, in advance of need  - Initiate/Maintain bed alarm  - Obtain necessary fall risk management equipment: non skid footwear  - Apply yellow socks and bracelet for high fall risk patients  - Consider moving patient to room near nurses station  Outcome: Progressing     Problem: MOBILITY - ADULT  Goal: Maintain or return to baseline ADL function  Description: INTERVENTIONS:  -  Assess patient's ability to carry out ADLs; assess patient's baseline for ADL function and identify physical deficits which impact ability to perform ADLs (bathing, care of mouth/teeth, toileting, grooming, dressing, etc.)  - Assess/evaluate cause of self-care deficits   - Assess range of motion  - Assess patient's mobility; develop plan if impaired  - Assess patient's need for assistive devices and provide as appropriate  - Encourage maximum independence but intervene and supervise when necessary  - Involve family in performance of ADLs  - Assess for home care needs following discharge   - Consider OT consult to assist with ADL evaluation and planning for discharge  - Provide patient education as appropriate  Outcome: Progressing  Goal: Maintains/Returns to pre admission functional level  Description: INTERVENTIONS:  - Perform AM-PAC 6 Click Basic Mobility/ Daily Activity assessment daily.  - Set and communicate daily mobility goal to care team and patient/family/caregiver.   - Collaborate with rehabilitation services on mobility goals if consulted  - Perform Range of Motion 3 times a day.  - Reposition patient every 3 hours.  - Dangle patient 2 times a day  - Out of bed for toileting  - Record patient progress and toleration of activity level   Outcome: Progressing

## 2024-04-02 NOTE — RESPIRATORY THERAPY NOTE
RT Ventilator Management Note  Carla Hunt 59 y.o. female MRN: [id number]  [facility] [id number]      Daily Screen         4/1/2024  0723 4/2/2024  0750          Patient safety screen outcome:: Failed Failed      Not Ready for Weaning due to:: -- Underline problem not resolved                Physical Exam:   Assessment Type: Assess only  General Appearance: Sleeping  Respiratory Pattern: Tachypneic, Assisted  Chest Assessment: Chest expansion symmetrical  Bilateral Breath Sounds: Rhonchi  Suction: Trach  O2 Device: G5 vent      Resp Comments: Recieved pt on APVcmv vent settings, no vent changes made at this time. Will cont to eh pt per resp prot.

## 2024-04-03 LAB
ABO GROUP BLD BPU: NORMAL
ABO GROUP BLD BPU: NORMAL
ABO GROUP BLD: NORMAL
ALBUMIN SERPL BCP-MCNC: 2.4 G/DL (ref 3.5–5)
ALP SERPL-CCNC: 210 U/L (ref 34–104)
ALT SERPL W P-5'-P-CCNC: 29 U/L (ref 7–52)
AMMONIA PLAS-SCNC: 28 UMOL/L (ref 18–72)
ANION GAP SERPL CALCULATED.3IONS-SCNC: 17 MMOL/L (ref 4–13)
ANION GAP SERPL CALCULATED.3IONS-SCNC: 17 MMOL/L (ref 4–13)
ANISOCYTOSIS BLD QL SMEAR: PRESENT
AST SERPL W P-5'-P-CCNC: 16 U/L (ref 13–39)
ATRIAL RATE: 149 BPM
B-OH-BUTYR SERPL-MCNC: 1.1 MMOL/L (ref 0.02–0.27)
BACTERIA BLD CULT: ABNORMAL
BACTERIA BLD CULT: ABNORMAL
BACTERIA SPT RESP CULT: ABNORMAL
BACTERIA SPT RESP CULT: ABNORMAL
BACTERIA UR QL AUTO: ABNORMAL /HPF
BASOPHILS # BLD MANUAL: 0 THOUSAND/UL (ref 0–0.1)
BASOPHILS NFR MAR MANUAL: 0 % (ref 0–1)
BILIRUB SERPL-MCNC: 1.22 MG/DL (ref 0.2–1)
BILIRUB UR QL STRIP: NEGATIVE
BLD GP AB SCN SERPL QL: NEGATIVE
BPU ID: NORMAL
BPU ID: NORMAL
BUDDING YEAST: PRESENT
BUN SERPL-MCNC: 103 MG/DL (ref 5–25)
BUN SERPL-MCNC: 103 MG/DL (ref 5–25)
CALCIUM ALBUM COR SERPL-MCNC: 11.3 MG/DL (ref 8.3–10.1)
CALCIUM SERPL-MCNC: 10 MG/DL (ref 8.4–10.2)
CALCIUM SERPL-MCNC: 10 MG/DL (ref 8.4–10.2)
CHLORIDE SERPL-SCNC: 109 MMOL/L (ref 96–108)
CHLORIDE SERPL-SCNC: 109 MMOL/L (ref 96–108)
CHLORIDE UR-SCNC: 31 MMOL/L
CK SERPL-CCNC: 24 U/L (ref 26–192)
CLARITY UR: ABNORMAL
CO2 SERPL-SCNC: 19 MMOL/L (ref 21–32)
CO2 SERPL-SCNC: 19 MMOL/L (ref 21–32)
COLOR UR: YELLOW
CREAT SERPL-MCNC: 1.48 MG/DL (ref 0.6–1.3)
CREAT SERPL-MCNC: 1.48 MG/DL (ref 0.6–1.3)
CREAT UR-MCNC: 15.8 MG/DL
EOSINOPHIL # BLD MANUAL: 0 THOUSAND/UL (ref 0–0.4)
EOSINOPHIL NFR BLD MANUAL: 0 % (ref 0–6)
ERYTHROCYTE [DISTWIDTH] IN BLOOD BY AUTOMATED COUNT: 17.8 % (ref 11.6–15.1)
ERYTHROCYTE [DISTWIDTH] IN BLOOD BY AUTOMATED COUNT: 18.7 % (ref 11.6–15.1)
GAMMA INTERFERON BACKGROUND BLD IA-ACNC: 0.12 IU/ML
GFR SERPL CREATININE-BSD FRML MDRD: 38 ML/MIN/1.73SQ M
GFR SERPL CREATININE-BSD FRML MDRD: 38 ML/MIN/1.73SQ M
GIANT PLATELETS BLD QL SMEAR: PRESENT
GLUCOSE SERPL-MCNC: 126 MG/DL (ref 65–140)
GLUCOSE SERPL-MCNC: 131 MG/DL (ref 65–140)
GLUCOSE SERPL-MCNC: 133 MG/DL (ref 65–140)
GLUCOSE SERPL-MCNC: 135 MG/DL (ref 65–140)
GLUCOSE SERPL-MCNC: 138 MG/DL (ref 65–140)
GLUCOSE SERPL-MCNC: 141 MG/DL (ref 65–140)
GLUCOSE SERPL-MCNC: 142 MG/DL (ref 65–140)
GLUCOSE SERPL-MCNC: 142 MG/DL (ref 65–140)
GLUCOSE SERPL-MCNC: 154 MG/DL (ref 65–140)
GLUCOSE SERPL-MCNC: 157 MG/DL (ref 65–140)
GLUCOSE SERPL-MCNC: 158 MG/DL (ref 65–140)
GLUCOSE SERPL-MCNC: 166 MG/DL (ref 65–140)
GLUCOSE SERPL-MCNC: 193 MG/DL (ref 65–140)
GLUCOSE UR STRIP-MCNC: NEGATIVE MG/DL
GRAM STN SPEC: ABNORMAL
HCT VFR BLD AUTO: 23.5 % (ref 34.8–46.1)
HCT VFR BLD AUTO: 24.2 % (ref 34.8–46.1)
HGB BLD-MCNC: 7.8 G/DL (ref 11.5–15.4)
HGB BLD-MCNC: 8.1 G/DL (ref 11.5–15.4)
HGB UR QL STRIP.AUTO: ABNORMAL
INR PPP: 1.96 (ref 0.84–1.19)
KETONES UR STRIP-MCNC: NEGATIVE MG/DL
LACTATE SERPL-SCNC: 1 MMOL/L (ref 0.5–2)
LEUKOCYTE ESTERASE UR QL STRIP: ABNORMAL
LYMPHOCYTES # BLD AUTO: 0 % (ref 14–44)
LYMPHOCYTES # BLD AUTO: 0 THOUSAND/UL (ref 0.6–4.47)
M TB IFN-G BLD-IMP: ABNORMAL
M TB IFN-G CD4+ BCKGRND COR BLD-ACNC: 0.01 IU/ML
M TB IFN-G CD4+ BCKGRND COR BLD-ACNC: 0.03 IU/ML
MAGNESIUM SERPL-MCNC: 2.5 MG/DL (ref 1.9–2.7)
MCH RBC QN AUTO: 30.3 PG (ref 26.8–34.3)
MCH RBC QN AUTO: 30.4 PG (ref 26.8–34.3)
MCHC RBC AUTO-ENTMCNC: 33.2 G/DL (ref 31.4–37.4)
MCHC RBC AUTO-ENTMCNC: 33.5 G/DL (ref 31.4–37.4)
MCV RBC AUTO: 91 FL (ref 82–98)
MCV RBC AUTO: 91 FL (ref 82–98)
METAMYELOCYTES NFR BLD MANUAL: 5 % (ref 0–1)
MITOGEN IGNF BCKGRD COR BLD-ACNC: <0.1 IU/ML
MONOCYTES # BLD AUTO: 0.36 THOUSAND/UL (ref 0–1.22)
MONOCYTES NFR BLD: 4 % (ref 4–12)
MYELOCYTES NFR BLD MANUAL: 7 % (ref 0–1)
NEUTROPHILS # BLD MANUAL: 7.53 THOUSAND/UL (ref 1.85–7.62)
NEUTS BAND NFR BLD MANUAL: 2 % (ref 0–8)
NEUTS SEG NFR BLD AUTO: 82 % (ref 43–75)
NITRITE UR QL STRIP: NEGATIVE
NON-SQ EPI CELLS URNS QL MICRO: ABNORMAL /HPF
NRBC BLD AUTO-RTO: 12 /100 WBC (ref 0–2)
NRBC BLD AUTO-RTO: 3 /100 WBCS
P AXIS: 77 DEGREES
PH UR STRIP.AUTO: 5.5 [PH]
PHOSPHATE SERPL-MCNC: 6.9 MG/DL (ref 2.7–4.5)
PLATELET # BLD AUTO: 65 THOUSANDS/UL (ref 149–390)
PLATELET # BLD AUTO: 65 THOUSANDS/UL (ref 149–390)
PLATELET BLD QL SMEAR: ABNORMAL
PMV BLD AUTO: 11 FL (ref 8.9–12.7)
PMV BLD AUTO: 12.6 FL (ref 8.9–12.7)
POTASSIUM SERPL-SCNC: 3.8 MMOL/L (ref 3.5–5.3)
POTASSIUM SERPL-SCNC: 3.8 MMOL/L (ref 3.5–5.3)
PR INTERVAL: 152 MS
PROT SERPL-MCNC: 5.1 G/DL (ref 6.4–8.4)
PROT UR STRIP-MCNC: ABNORMAL MG/DL
PROTHROMBIN TIME: 22 SECONDS (ref 11.6–14.5)
QRS AXIS: 53 DEGREES
QRSD INTERVAL: 92 MS
QT INTERVAL: 336 MS
QTC INTERVAL: 529 MS
RBC # BLD AUTO: 2.57 MILLION/UL (ref 3.81–5.12)
RBC # BLD AUTO: 2.67 MILLION/UL (ref 3.81–5.12)
RBC #/AREA URNS AUTO: ABNORMAL /HPF
RBC MORPH BLD: PRESENT
RENAL EPI CELLS #/AREA URNS HPF: PRESENT /[HPF]
RH BLD: NEGATIVE
SODIUM 24H UR-SCNC: 36 MOL/L
SODIUM SERPL-SCNC: 145 MMOL/L (ref 135–147)
SODIUM SERPL-SCNC: 145 MMOL/L (ref 135–147)
SP GR UR STRIP.AUTO: 1.02 (ref 1–1.03)
SPECIMEN EXPIRATION DATE: NORMAL
T WAVE AXIS: 80 DEGREES
TOXIC GRANULES BLD QL SMEAR: PRESENT
UNIT DISPENSE STATUS: NORMAL
UNIT DISPENSE STATUS: NORMAL
UNIT PRODUCT CODE: NORMAL
UNIT PRODUCT CODE: NORMAL
UNIT PRODUCT VOLUME: 268 ML
UNIT PRODUCT VOLUME: 280 ML
UNIT RH: NORMAL
UNIT RH: NORMAL
UROBILINOGEN UR STRIP-ACNC: <2 MG/DL
UUN 24H UR-MCNC: 480 MG/DL
VENTRICULAR RATE: 149 BPM
WBC # BLD AUTO: 10.44 THOUSAND/UL (ref 4.31–10.16)
WBC # BLD AUTO: 8.97 THOUSAND/UL (ref 4.31–10.16)
WBC #/AREA URNS AUTO: ABNORMAL /HPF
WBC CLUMPS # UR AUTO: PRESENT /UL
WBC TOXIC VACUOLES BLD QL SMEAR: PRESENT

## 2024-04-03 PROCEDURE — 85027 COMPLETE CBC AUTOMATED: CPT | Performed by: STUDENT IN AN ORGANIZED HEALTH CARE EDUCATION/TRAINING PROGRAM

## 2024-04-03 PROCEDURE — 85025 COMPLETE CBC W/AUTO DIFF WBC: CPT | Performed by: STUDENT IN AN ORGANIZED HEALTH CARE EDUCATION/TRAINING PROGRAM

## 2024-04-03 PROCEDURE — 82550 ASSAY OF CK (CPK): CPT

## 2024-04-03 PROCEDURE — 94640 AIRWAY INHALATION TREATMENT: CPT

## 2024-04-03 PROCEDURE — 93005 ELECTROCARDIOGRAM TRACING: CPT

## 2024-04-03 PROCEDURE — 43235 EGD DIAGNOSTIC BRUSH WASH: CPT | Performed by: INTERNAL MEDICINE

## 2024-04-03 PROCEDURE — 94669 MECHANICAL CHEST WALL OSCILL: CPT

## 2024-04-03 PROCEDURE — 94760 N-INVAS EAR/PLS OXIMETRY 1: CPT

## 2024-04-03 PROCEDURE — 83735 ASSAY OF MAGNESIUM: CPT | Performed by: STUDENT IN AN ORGANIZED HEALTH CARE EDUCATION/TRAINING PROGRAM

## 2024-04-03 PROCEDURE — 99152 MOD SED SAME PHYS/QHP 5/>YRS: CPT | Performed by: INTERNAL MEDICINE

## 2024-04-03 PROCEDURE — 83605 ASSAY OF LACTIC ACID: CPT

## 2024-04-03 PROCEDURE — 99232 SBSQ HOSP IP/OBS MODERATE 35: CPT | Performed by: NURSE PRACTITIONER

## 2024-04-03 PROCEDURE — 82436 ASSAY OF URINE CHLORIDE: CPT

## 2024-04-03 PROCEDURE — 30233L1 TRANSFUSION OF NONAUTOLOGOUS FRESH PLASMA INTO PERIPHERAL VEIN, PERCUTANEOUS APPROACH: ICD-10-PCS | Performed by: INTERNAL MEDICINE

## 2024-04-03 PROCEDURE — 86923 COMPATIBILITY TEST ELECTRIC: CPT

## 2024-04-03 PROCEDURE — 84100 ASSAY OF PHOSPHORUS: CPT

## 2024-04-03 PROCEDURE — 94664 DEMO&/EVAL PT USE INHALER: CPT

## 2024-04-03 PROCEDURE — 84540 ASSAY OF URINE/UREA-N: CPT | Performed by: INTERNAL MEDICINE

## 2024-04-03 PROCEDURE — 86850 RBC ANTIBODY SCREEN: CPT

## 2024-04-03 PROCEDURE — 81001 URINALYSIS AUTO W/SCOPE: CPT | Performed by: INTERNAL MEDICINE

## 2024-04-03 PROCEDURE — 94003 VENT MGMT INPAT SUBQ DAY: CPT

## 2024-04-03 PROCEDURE — C9254 INJECTION, LACOSAMIDE: HCPCS | Performed by: STUDENT IN AN ORGANIZED HEALTH CARE EDUCATION/TRAINING PROGRAM

## 2024-04-03 PROCEDURE — P9037 PLATE PHERES LEUKOREDU IRRAD: HCPCS

## 2024-04-03 PROCEDURE — 93010 ELECTROCARDIOGRAM REPORT: CPT | Performed by: INTERNAL MEDICINE

## 2024-04-03 PROCEDURE — C9113 INJ PANTOPRAZOLE SODIUM, VIA: HCPCS | Performed by: INTERNAL MEDICINE

## 2024-04-03 PROCEDURE — 80053 COMPREHEN METABOLIC PANEL: CPT

## 2024-04-03 PROCEDURE — 82570 ASSAY OF URINE CREATININE: CPT

## 2024-04-03 PROCEDURE — 99233 SBSQ HOSP IP/OBS HIGH 50: CPT | Performed by: INTERNAL MEDICINE

## 2024-04-03 PROCEDURE — 86901 BLOOD TYPING SEROLOGIC RH(D): CPT

## 2024-04-03 PROCEDURE — 0DJ08ZZ INSPECTION OF UPPER INTESTINAL TRACT, VIA NATURAL OR ARTIFICIAL OPENING ENDOSCOPIC: ICD-10-PCS | Performed by: INTERNAL MEDICINE

## 2024-04-03 PROCEDURE — 84300 ASSAY OF URINE SODIUM: CPT | Performed by: INTERNAL MEDICINE

## 2024-04-03 PROCEDURE — 85007 BL SMEAR W/DIFF WBC COUNT: CPT | Performed by: STUDENT IN AN ORGANIZED HEALTH CARE EDUCATION/TRAINING PROGRAM

## 2024-04-03 PROCEDURE — 85610 PROTHROMBIN TIME: CPT

## 2024-04-03 PROCEDURE — 82010 KETONE BODYS QUAN: CPT

## 2024-04-03 PROCEDURE — P9017 PLASMA 1 DONOR FRZ W/IN 8 HR: HCPCS

## 2024-04-03 PROCEDURE — 99291 CRITICAL CARE FIRST HOUR: CPT | Performed by: INTERNAL MEDICINE

## 2024-04-03 PROCEDURE — 80048 BASIC METABOLIC PNL TOTAL CA: CPT

## 2024-04-03 PROCEDURE — 99292 CRITICAL CARE ADDL 30 MIN: CPT | Performed by: INTERNAL MEDICINE

## 2024-04-03 PROCEDURE — 99233 SBSQ HOSP IP/OBS HIGH 50: CPT | Performed by: STUDENT IN AN ORGANIZED HEALTH CARE EDUCATION/TRAINING PROGRAM

## 2024-04-03 PROCEDURE — 86900 BLOOD TYPING SEROLOGIC ABO: CPT

## 2024-04-03 PROCEDURE — P9040 RBC LEUKOREDUCED IRRADIATED: HCPCS

## 2024-04-03 PROCEDURE — 82140 ASSAY OF AMMONIA: CPT

## 2024-04-03 PROCEDURE — 82948 REAGENT STRIP/BLOOD GLUCOSE: CPT

## 2024-04-03 RX ORDER — FLUCONAZOLE 2 MG/ML
200 INJECTION, SOLUTION INTRAVENOUS EVERY 24 HOURS
Status: DISCONTINUED | OUTPATIENT
Start: 2024-04-04 | End: 2024-04-03

## 2024-04-03 RX ORDER — SODIUM CHLORIDE, SODIUM GLUCONATE, SODIUM ACETATE, POTASSIUM CHLORIDE, MAGNESIUM CHLORIDE, SODIUM PHOSPHATE, DIBASIC, AND POTASSIUM PHOSPHATE .53; .5; .37; .037; .03; .012; .00082 G/100ML; G/100ML; G/100ML; G/100ML; G/100ML; G/100ML; G/100ML
500 INJECTION, SOLUTION INTRAVENOUS ONCE
Status: COMPLETED | OUTPATIENT
Start: 2024-04-03 | End: 2024-04-03

## 2024-04-03 RX ORDER — MINOCYCLINE HYDROCHLORIDE 100 MG/1
200 CAPSULE ORAL EVERY 12 HOURS SCHEDULED
Status: DISCONTINUED | OUTPATIENT
Start: 2024-04-03 | End: 2024-04-04

## 2024-04-03 RX ORDER — PROPOFOL 10 MG/ML
150 INJECTION, EMULSION INTRAVENOUS ONCE
Status: COMPLETED | OUTPATIENT
Start: 2024-04-03 | End: 2024-04-03

## 2024-04-03 RX ORDER — FENTANYL CITRATE 50 UG/ML
100 INJECTION, SOLUTION INTRAMUSCULAR; INTRAVENOUS ONCE
Status: COMPLETED | OUTPATIENT
Start: 2024-04-03 | End: 2024-04-03

## 2024-04-03 RX ORDER — PROPOFOL 10 MG/ML
INJECTION, EMULSION INTRAVENOUS
Status: COMPLETED
Start: 2024-04-03 | End: 2024-04-03

## 2024-04-03 RX ORDER — FLUCONAZOLE 2 MG/ML
400 INJECTION, SOLUTION INTRAVENOUS EVERY 24 HOURS
Status: DISCONTINUED | OUTPATIENT
Start: 2024-04-04 | End: 2024-04-04

## 2024-04-03 RX ADMIN — PANTOPRAZOLE SODIUM 40 MG: 40 INJECTION, POWDER, FOR SOLUTION INTRAVENOUS at 20:28

## 2024-04-03 RX ADMIN — PROPOFOL 150 MG: 10 INJECTION, EMULSION INTRAVENOUS at 15:52

## 2024-04-03 RX ADMIN — SODIUM CHLORIDE SOLN NEBU 3% 4 ML: 3 NEBU SOLN at 19:33

## 2024-04-03 RX ADMIN — LACOSAMIDE 100 MG: 10 INJECTION, SOLUTION INTRAVENOUS at 20:47

## 2024-04-03 RX ADMIN — CHLORHEXIDINE GLUCONATE 0.12% ORAL RINSE 15 ML: 1.2 LIQUID ORAL at 20:28

## 2024-04-03 RX ADMIN — NYSTATIN: 100000 POWDER TOPICAL at 17:51

## 2024-04-03 RX ADMIN — MICAFUNGIN SODIUM 100 MG: 50 INJECTION, POWDER, LYOPHILIZED, FOR SOLUTION INTRAVENOUS at 20:29

## 2024-04-03 RX ADMIN — PIPERACILLIN SODIUM AND TAZOBACTAM SODIUM 4.5 G: 36; 4.5 INJECTION, POWDER, LYOPHILIZED, FOR SOLUTION INTRAVENOUS at 13:50

## 2024-04-03 RX ADMIN — LACOSAMIDE 100 MG: 10 INJECTION, SOLUTION INTRAVENOUS at 08:33

## 2024-04-03 RX ADMIN — MINOCYCLINE HYDROCHLORIDE 200 MG: 100 CAPSULE ORAL at 17:51

## 2024-04-03 RX ADMIN — FLUCONAZOLE IN SODIUM CHLORIDE 400 MG: 2 INJECTION, SOLUTION INTRAVENOUS at 08:20

## 2024-04-03 RX ADMIN — SODIUM CHLORIDE SOLN NEBU 3% 4 ML: 3 NEBU SOLN at 07:45

## 2024-04-03 RX ADMIN — FENTANYL CITRATE 100 MCG: 50 INJECTION INTRAMUSCULAR; INTRAVENOUS at 15:53

## 2024-04-03 RX ADMIN — SODIUM CHLORIDE SOLN NEBU 3% 4 ML: 3 NEBU SOLN at 13:20

## 2024-04-03 RX ADMIN — SULFAMETHOXAZOLE AND TRIMETHOPRIM 160 MG OF TRIMETHOPRIM: 80; 16 INJECTION, SOLUTION, CONCENTRATE INTRAVENOUS at 10:55

## 2024-04-03 RX ADMIN — LEVALBUTEROL HYDROCHLORIDE 1.25 MG: 1.25 SOLUTION RESPIRATORY (INHALATION) at 19:33

## 2024-04-03 RX ADMIN — CHLORHEXIDINE GLUCONATE 0.12% ORAL RINSE 15 ML: 1.2 LIQUID ORAL at 08:20

## 2024-04-03 RX ADMIN — LEVALBUTEROL HYDROCHLORIDE 1.25 MG: 1.25 SOLUTION RESPIRATORY (INHALATION) at 07:45

## 2024-04-03 RX ADMIN — SODIUM CHLORIDE, SODIUM GLUCONATE, SODIUM ACETATE, POTASSIUM CHLORIDE, MAGNESIUM CHLORIDE, SODIUM PHOSPHATE, DIBASIC, AND POTASSIUM PHOSPHATE 500 ML: .53; .5; .37; .037; .03; .012; .00082 INJECTION, SOLUTION INTRAVENOUS at 19:08

## 2024-04-03 RX ADMIN — SODIUM CHLORIDE, SODIUM GLUCONATE, SODIUM ACETATE, POTASSIUM CHLORIDE, MAGNESIUM CHLORIDE, SODIUM PHOSPHATE, DIBASIC, AND POTASSIUM PHOSPHATE 500 ML: .53; .5; .37; .037; .03; .012; .00082 INJECTION, SOLUTION INTRAVENOUS at 20:47

## 2024-04-03 RX ADMIN — Medication 2 SPRAY: at 20:28

## 2024-04-03 RX ADMIN — PIPERACILLIN SODIUM AND TAZOBACTAM SODIUM 4.5 G: 36; 4.5 INJECTION, POWDER, LYOPHILIZED, FOR SOLUTION INTRAVENOUS at 18:00

## 2024-04-03 RX ADMIN — METHYLPREDNISOLONE SODIUM SUCCINATE 30 MG: 40 INJECTION, POWDER, FOR SOLUTION INTRAMUSCULAR; INTRAVENOUS at 08:20

## 2024-04-03 RX ADMIN — PANTOPRAZOLE SODIUM 40 MG: 40 INJECTION, POWDER, FOR SOLUTION INTRAVENOUS at 08:20

## 2024-04-03 RX ADMIN — ACETAMINOPHEN 650 MG: 325 TABLET, FILM COATED ORAL at 20:51

## 2024-04-03 RX ADMIN — NYSTATIN: 100000 POWDER TOPICAL at 08:21

## 2024-04-03 RX ADMIN — Medication 2 SPRAY: at 08:21

## 2024-04-03 RX ADMIN — LEVALBUTEROL HYDROCHLORIDE 1.25 MG: 1.25 SOLUTION RESPIRATORY (INHALATION) at 13:20

## 2024-04-03 NOTE — PROGRESS NOTES
Pastoral Care Progress Note    4/3/2024  Patient: Carla Hunt : 1965  Admission Date & Time: 1/10/2024 1941  MRN: [id number] CSN: [id number]      Father Willow had pastoral visit with  of pt and chaplains remain available.                  24 1200   Clinical Encounter Type   Visited With Patient and family together  (Father Willow)

## 2024-04-03 NOTE — PROGRESS NOTES
[facility]  Progress Note: Critical Care  Name: Carla Hunt 59 y.o. female I MRN: [id number]  Unit/Bed#: MICU 10 I Date of Admission: 1/10/2024   Date of Service: 4/3/2024 I Hospital Day: 84    Assessment/Plan   Acute hypoxic respiratory failure; s/p tracheostomy 3/12  Chaz fungemia; Bcx x2 (3/31) growing candida albicans   Recurrent Stenotrophomonas PNA, previously on Bactrim, switched to minocycline 2/2 ELIESER now resolved, completed 14d course 3/27. On minocycline currently day 2  AGMA; in setting of uremia  Hyperphosphatemia   Bilateral ground glass opacities, persistent, improving  Pancytopenia- multifactorial including hx b-cell lymphoma, persistent inflammation, s/p granix  Acute on chronic anemia- multifactorial-intermittent blood loss from ostomy site, chronic inflammation, iatrogenic, marrow dysfunction in setting of persistent inflammation   Lymphopenia with bandemia, in setting of high dose corticosteroids, improving s/p granix  Severe metabolic encephalopathy, multifactorial including ?uremia, improving  Uremia, ?catabolic from steroids, improving  Oropharyngeal dysphagia, likely 2/2 multiple prolonged intubations  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; complicated by poor wound healing  Wound dehiscence 2/2 poor wound healing s/p wound vac 3/13  Cutaneous paratracheal ulceration  B-Cell lymphoma, s/p chemotheraphy 2022, Rituxan maintenance therapy on hold  Seizure disorder; on vimpat  Steroid induced hyperglycemia;on insulin gtt  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  Hx of complex migraines s/p L temporal lobectomy 2007 complicated by below  Hx of complex seizures in setting of #17, on AEDs  COVID-19 infection POA; resolved; s/p multiple courses of antivirals 2/2 persistent infection,  most recent Remdesivir course completed 3/15, superimposed by recurrent PNA s/p multiple courses Abx  Minimal SAH POA, no  interventions required, resolved on repeat CTH     Neuro:  Diagnosis: Alertness  -Hold modafinil 200mg qd delerium precautions  Diagnosis: Analgesia  - PRN Fentanyl 25mcg/hr; on holiday for frequent neurochecks     Diagnosis: Metabolic encephalopathy; POA, improving  -Waxing and waning neuro exam since admission  -Most recent repeat CTH 3/13 negative for acute intracranial findings.  Has had extensive neurological workup including MRI/CT neuroimaging, LP with unremarkable findings  -Now remains off sedation. Electrolytes, sodium, Hyperglycemia 2/2 high dose steroids requiring insulin gtt. Ammonia normal. May be prolonged 2/2 PNA, possibly worsened by uremic encephalopathy worsened by ELIESER now resolved, uremia improving; candida growth seen on BAL Cx from 3/11, may be respiraotry colonization, however currently without signs of systemic fungal infection but is at high risk 2/2 lymphopenia, depressed immunoglobulins in setting of B cell lymphoma and high dose corticosteroids.   3/31- More obtunded, mentation worsening. STAT CTH w no new changes.   Bcx 2/2 (3/31) growing fungemia, identified as candida albicans    Plan:  -Await repeat EGD today  -cryptococcal ag pending  -Aspergillus galactomannan ag pending  -Tb quantiferon gold pending  -Slow steroid wean: currently IV Solumedrol 40mg BID 7d course  -S/P 14d Remdesivir course to tx COVID-19, completed 3/15  -S/P 14d Minocycline course to tx stenotrophomonas PNA, completed 3/27   -Continue modafinil as above  -Avoid PRN fentanyl/sedative meds as able.  -Delirium precautions  -Frequent neurochecks  -Defer MRI brain due to clinical improvement, not likely to      Diagnosis: seizure disorder, known history  -S/p partial left temporal lobe resection in 2007 2/2 complex migraines  -On Lamictal 150 bid and Topamax 50mg bid at home  -Last lamotrigine level from 03/02 at 10.7 (therapeutic)  -Home Lamictal switched to Vimpat 3/27 due to worsening pancytopenia,  neuro following  -Continue Vimpat 100mg bid maintenance dose  -Continue home topiramate 50mg bid  -Seizure precautions      Diagnosis: Anxiety, known history  -On Effexor 12.5 mg TID     CV:  -Diagnosis: Sinus tachycardia  -TTE 3/17 with no major structural dysfunction  -Multifactorial 2/2 deconditioning, dehydration/hypvol, acute on chronic anemia, underlying infectious/inflammatory process, pain, Modafinal-induced  TTE 4/1 with EF 70%, no WMA, no changes from prior  Plan:  - See plans under Pulm, Heme/Onc, ID, MSK      Pulm:  Diagnosis: Acute hypoxic respiratory failure;  Diagnosis: Bilateral ground glass opacities, improving  -Vent dependent; s/p trach 3/12 after was reintubated 3/11 due to increased WOB  -Persistently COVID+ since admission s/p multiple courses of antivirals (most recent completed 3/15), complicated by recurrent bacterial PNA treated w 10d levaquin (completed 2/25) for MDR PNA; most recent BAL +recurrent stenotrophomona treated with Bactrim, swithced to minocyline 14d course completed 3/28.  -Etiology Likely multifactorial including possible COVID pneumonitis vs recurrent PNA vs hypersensitivity pneumonitis 2/2 ?rituxumab. Persistent inflammation likely given patient has been steroid responsive throughout admission.   -CRP increasing but likely from intraabdominal inflammation as patient is noted to have clinical improvement with weaning of steroids, ABx.   -Repeat CXR 3/22 with significant improvement of multifocal PNA. Repeat COVID ag negative x2 3/27  -Follow up anti-Rituximab ab  Plan:  -Aspergillus galactomannan ag pending  -Tb quantiferon gold pending  -S/P 14d Remdesivir course to tx COVID-19, completed 3/15  -S/P 14d Minocycline course to tx stenotrophomonas PNA, completed 3/27   -Steroid wean 40mg qd x7d, decrease by 10mg qd dosing x7d  -IV diuresis if vol overloaded  -Hold off on starting empiric antifungal given clinical improvement  -Continue trach collar, wean O2 as able      GI:  Diagnosis: Partial cecal volvulus s/p right hemicolectomy with ostomy pouch in place  -Complicated by poor wound healing of midline incision as evidenced by wound dehiscence s/p wound vac that was removed 3/17 by gen surg.  -Stoma with dark brown output, consistent with prior  Plan:  -Wound vac as per general surgery  -Monitor ostomy pouch output  -Surgery following, will keep them updated if any further changes     Diagnosis: oropharyngeal dysphagia   -Has had multiple and prolonged intubations now s/p trach   -VBS 3/27 oropharyngeal dysphagia  -Speech therapy to begin neuromuscular electrical stim/NMES/VitalStim pending off ventilation   -Continue tube feeds for now until PEG placement pending abdominal wound healing as per Gen Surg    Diagnosis: UGIB  Acute on chronic anemia associated bloody NG output.  Uremic, worsening suggestive of UGIB given relatively stable Cr  Bedside EGD 4/2 byJOSY showing gastric ulcerations    Plan:  Repeat EGD planned 4/3  Follow up HSV/CMV/candida from GI sample  Maintain NPO    :  Diagnosis: uremia  -?catabolic from steroids, may also have ?slow UGIB in setting of prolonged steroids though no overt s/s of GIB and H&H stable since 3/23 and patient is on ppi  -Trending down  -EGD as above    Diagnosis: AGMA  AG 17  Likely from uremia. Unlikely septic/lactatic given HDS.   Plan: follow up lactate, ck    Diagnosis: CKD III,   -Baseline Cr  0.8-1.2  -Cr now stable and at baseline.   -UO adequate, on Ortiz, draining clear yellow urine  -Prerenal azotemia 2/2 uremia,   Plan:  -Trend daily BMP  -Continue to monitor I/O and UOP  -Avoid nephrotoxins, contrast dye as able  -See plan under uremia     F/E/N:  F: none  E: monitor and replete for goal K>4, P>3, Mg>2   Diagnosisi: Hyperphosphatemia: phos elevated 6.9. In setting of AGMA. Vitamin D/PTH wnl. Consider Sevelemir in consultation with nephro when diet resumed and adjust tube feeds. Can get urine studies to assess for intrarenal  pathology however is on CBI  N: tube feeds with vital 1.5; 45 cc/h, Prosource liquid protein to 1 packet BID, Refugio BID to support wound healing       Heme/Onc:  Diagnosis: Pancytopenia  -In setting of hx of B cell lymphoma, prior chemo, Rituxan therapy, prior abx and present meds including lamictal  -Hemo onc consulted, empirically given Filgastrim 300mg qd x3d (complated 3/30); IR bone marrow bx deferred by heme-onc   -Lamictal switched to Vimpat in consultation with neuro as above due to potential contribution to pancytopenia  -Trend CBC and diff daily, neutropenic precautions for ANC <500    Diagnosis: Acute on chronic anemia  -Baseline Hgb ~7-8. S/P 2U PRBCs 3/17 after repeat Hgb 5.3, total of 6U transfuse  d since admission.  -Patient had significant blood loss from ostomy site 3/20, resulting in hemorraghic shock, improved with blood transfusion; hemostasis achieved after bleeding source identified and sutured. Another large vol danie bloody output from osotomy on 3/21, bleeding source within ostomy site, sutured.    -Also having intermittent vaginal bleeding  -May still have slow GIB due to persistent uremia, GI previously consulted for EGD but was deferred due to hemodynamic instability at the time  -?Worsened by impaired marrow response liekly 2/2 persistent inflammation due to low retic index ~1 prior to most recent transfusions; normocytic with normal B12/folate levels; MCV<100. Iatrogenic cause likely contributing 2/2 frequent blood draws; chronic anemia likely persistent inflammatory state/CKD/lymphoma in setting of elevated ferritin of 974. SPEP/UPEP negative. Hemolysis workup negative.   Plan:  -Routine monitoring ostomy output, if bleeding, apply direct pressure and contact gen surg STAT for hemostasis .  -Continue tube feeds/nutritional support  -Trend CBC, transfuse Transfuse with leukoreduced and irradiated RBCs to maintain Hgb>7  -See plan under pancytopenia     Diagnosis:  Thrombocytopenia  -Likely multifactorial including imparied production from marrow dysfunction in setting of persistent inflammation; RI low suggestive of hyperproliferation, hx of B-cell lymphoma, recent infections including persistent COVID, PNA treated, CMV negative. No known history of vWD/congenital disorders. No liver dysfunction; recent hepatic profile unremarkable. HIT workup negative, Hemolytic workup negative. No s/s DIC. No mechanical valves. ?Primary ITP.  Plan:  -Filgrastim 300mg x3 to aid in plt recovery  -Transfuse with leukoreduced and irradiated PLTs if count <20k due to increased risk of bleeding   -Goal >50k  -Hold Lovenox for DVT ppx, resume once repeat H&H>7  -Resume DVT PPx if Plt>50k  -See plan under pancytopenia      Diagnosis Lymphopenia with bandemia  -In setting of high dose steroids  -Severely depressed immunoglobulins inclding IgG, IgA, IgM  -At risk for further infections  -Filgrastim 300mg x3 to aid in plt recovery  -Contact and airborne precautions    Diagnosis: B cell lymphoma  -Follows with heme[facility]onc [facility]  -S/P 6 cycles of chemotherapy in 20222  -Maintained on Rituxan for 2 year course PTA, started May 2022, last dose was 12/2024, treatment currently on hold in setting of persistent COVID-19 infection  Anti-Ritux Ab negative    Plan:  -Holding rituximab in setting of persistent COVID-19 infection, now resolved.  ?resume rituxubmab pending clinical stability & anti-ritux ab status in consultation with heme-onc     DVT ppx: hold lovenox 2/2 thrombocytopenia     Endo:  Diagnosis: steroid hyperglycemia and diabetes mellitus type 2, A1c at 6.6 from 01/2024  -Goal -180  -Insulin gtt     ID:  Diagnosis: fungemia   Bcx x2 (3/31) growing yeast, ID panel- candida albicans  In setting of severe lymphopenia and severely depressed immunoglobulins   Plan:   Continue micofungin, flucanzole  Stop all broad spectrum abx as it can contribute to fungal growth   Central/midlines  changed  Consult ophth  TTE already done 4/1, no new changes  ID following    Diagnosis: Recurrent bacterial pneumonia  - Patient has completed multiple treatment courses of remdesivir throughout hospitalization in addition to 7 days of ceftriaxone.  - BAL cultures in 2/2024 positive for MDR Pseudomonas and stenotrophomonas treated with 10d course of Levaquin  - CT C/A/P 3/10 with persistent groundglass opacities and changes suggestive of sequelae of COVID, prior infections.  Completed 10d course of cefepime for broad spectrum coveragge (completed (3/13)  -Repeat BAL cultures from 3/11 showing 4+ growth Stenotrophomonas maltophilia. Could be colonization given prior BAL growth of same, however due to recent intubation with prolonged corticosteroid theraphy, will begin treating as true pathogen. Patient otherwise remains afebrile, no leukocytosis. CRP with down trending.  Previously on Bactrim from 3/13 to 3/18, discontinued due to worsening ELIESER  Plan:  -S/P 14d Minocycline course to tx stenotrophomonas PNA, completed 3/27   -IV steroids as above  -Was started on broad spectrum cefepime 3/31 and vanco 4/1  -Continue  minocycline sputum Cx growing steno (3/31) day 2  -Stop all abx as abx can propogate fungal growth; Bcx x2 (3/31) fungemia    MSK/Skin:  Diagnosis: Midline incision wound with poor wound healing-  -General surgery performed staple removal of the patient's midline incision on 3/12 with some skin signs appreciated at the inferior aspect of the wound without obvious pus drainage. Overnight 3/13, wound dehiscence progressed requiring general surgery reevaluation. Red/brown aspirate noted, fascia intact. Wet-to-dry dressings in place. General surgery following for next Epson wound care.  -Poor wound healing in setting of high-dose steroid therapy.  No  exam no obvious signs of infection at this time.   -S/P wound vac replacement 3/13 as per gen surgery. No active concerns for cellulitis.  Plan:  -Wound VAC  management as per general surgery  -Wound care for peritracheostomy wound  -Abdominal wound care as per general surgery  -Routine monitoring     Diagnosis: Critical illness myopathy  -Likely exacerbated by high-dose steroid therapy  Plan:  -Continue to wean steroids as above  -Aggressive PT/OT         Disposition: Critical care    ICU Core Measures     Vented Patient  VAP Bundle  VAP bundle ordered     A: Assess, Prevent, and Manage Pain Has pain been assessed? Yes  Need for changes to pain regimen? NA   B: Both Spontaneous Awakening Trials (SATs) and Spontaneous Breathing Trials (SBTs) Plan to perform spontaneous awakening trial today? N/A   Plan to perform spontaneous breathing trial today? N/A   Obvious barriers to extubation? NA   C: Choice of Sedation RASS Goal: N/A patient not on sedation  Need for changes to sedation or analgesia regimen? NA   D: Delirium CAM-ICU: Negative   E: Early Mobility  Plan for early mobility? Yes   F: Family Engagement Plan for family engagement today? Yes       Antibiotic Review: Patient on appropriate coverage based on culture data.  and Infectious disease consulted    Review of Invasive Devices:    Ortiz Plan: voiding trial today        Prophylaxis:  VTE Contraindicated secondary to: Plt <50   Stress Ulcer  covered bypantoprazole (PROTONIX) injection 40 mg [[id number]]        Significant 24hr Events       Overnight: Given 1U prbc, 1U platelets      Subjective   Patient denies any pain or discomfort via head nodding.    Review of Systems   Unable to perform ROS: Acuity of condition        Objective                            Vitals I/O      Most Recent Min/Max in 24hrs   Temp 97.8 °F (36.6 °C) Temp  Min: 97.8 °F (36.6 °C)  Max: 99.1 °F (37.3 °C)   Pulse (!) 119 Pulse  Min: 75  Max: 133   Resp (!) 31 Resp  Min: 16  Max: 39   /71 BP  Min: 88/51  Max: 129/68   O2 Sat 100 % SpO2  Min: 90 %  Max: 100 %     Vent: APVcmv  16/450/peep6/40%    LDA  Peripherals (R, L)    Ileostomy RUQ,  draining bilious o/p  Wound vac  NGT   Ortiz  Surgical airway , cuffed       Intake/Output Summary (Last 24 hours) at 4/3/2024 0741  Last data filed at 4/3/2024 0601  Gross per 24 hour   Intake 2184.86 ml   Output 1175 ml   Net 1009.86 ml       Diet NPO    Invasive Monitoring             Physical Exam   Physical Exam  Eyes:      Pupils: Pupils are equal, round, and reactive to light.   Skin:     General: Skin is warm and dry.   HENT:      Nose: No epistaxis.      Mouth/Throat:      Mouth: Mucous membranes are moist.   Neck:      Trachea: Tracheostomy present.   Cardiovascular:      Rate and Rhythm: Regular rhythm. Tachycardia present.      Heart sounds: No murmur heard.  Musculoskeletal:         General: No swelling.      Right lower leg: No edema.      Left lower leg: No edema.   Abdominal: General: An ostomy site is present. There is ostomy site.     Palpations: Abdomen is soft.      Tenderness: There is no abdominal tenderness.      Comments: Wound vac   Constitutional:       Appearance: She is ill-appearing.      Interventions: She is intubated. She is not sedated.  Pulmonary:      Effort: Tachypnea present. No accessory muscle usage, respiratory distress or accessory muscle usage. She is intubated.      Breath sounds: Normal breath sounds.   Neurological:      Comments: Intubated   , Lethargic appearing  and Unable to assess strength due to profound weakness in all extremities    Genitourinary/Anorectal:     Comments: Ortiz draining clear yellow urine  Ortiz present.          Diagnostic Studies      EKG: no new  Imaging: no new I have personally reviewed pertinent reports.       Medications:  Scheduled PRN   chlorhexidine, 15 mL, Q12H SARTHAK  fluconazole, 400 mg, Q24H  lacosamide, 100 mg, Q12H  levalbuterol, 1.25 mg, TID  methylPREDNISolone sodium succinate, 30 mg, Daily  micafungin, 100 mg, Q24H  nystatin, , BID  pantoprazole, 40 mg, Q12H SARTHAK  polyethylene glycol, 17 g, Daily  sodium chloride, 2 spray,  BID  sodium chloride, 4 mL, TID  sulfamethoxazole-trimethoprim, 160 mg of trimethoprim, Once per day on Monday Wednesday Saturday      acetaminophen, 650 mg, Q6H PRN  fentaNYL, 25 mcg, Q1H PRN  ondansetron, 4 mg, Q6H PRN  sodium chloride, 1 Application, Q1H PRN       Continuous    insulin regular (HumuLIN R,NovoLIN R) 1 Units/mL in sodium chloride 0.9 % 100 mL infusion, 0.3-21 Units/hr, Last Rate: 0.5 Units/hr (04/03/24 0157)           Labs:    CBC    Recent Labs     04/02/24  0450 04/02/24  0837 04/02/24 2007 04/02/24  2153   WBC 7.03   < > 7.96 7.68   HGB 7.9*   < > 6.8* 6.7*   HCT 24.3*   < > 20.4* 20.9*   PLT 66*   < > 45* 46*   BANDSPCT 41*  --  44*  --     < > = values in this interval not displayed.     BMP    Recent Labs     04/02/24  0802 04/03/24  0430   SODIUM 141 145   K 4.4 3.8   * 109*   CO2 20* 19*   AGAP 11 17*   * 103*   CREATININE 1.44* 1.48*   CALCIUM 10.3* 10.0             Coags    Recent Labs     04/01/24  1553 04/03/24  0430   INR 1.75* 1.96*   PTT 35  --         Additional Electrolytes  Recent Labs     04/02/24  0802 04/03/24  0430   MG 2.4 2.5   PHOS 6.0* 6.9*          Blood Gas    No recent results  No recent results   LFTs  No recent results        Infectious  Recent Labs     04/01/24  1431   PROCALCITONI 7.19*      Bcx x2 (3/31) Yeast, ID panel- candida albicans  Sputum Cx (3/31)- Stenotrophomonas maltophilia    quantiferon gold pending  Cryptococcal pending  Aspergillus pending    COVID ag 3/25 negative  COVID ag 3/27 negative    BAL 3/11   --4+ steno malto., 2+ candida  --Viral Cx neg  --Fungal 4+ candida  CMV neg  PCP smear neg  COVID pcr + on 3/11 Glucose  Recent Labs     04/02/24  0802 04/03/24  0430   GLUC 125 142*               Joe Lazcano DO

## 2024-04-03 NOTE — CASE MANAGEMENT
Case Management Discharge Planning Note    Patient name Carla Hunt  Location MICU 10/MICU 10 MRN [id number]  : 1965 Date 4/3/2024       Current Admission Date: 1/10/2024  Current Admission Diagnosis:Acute respiratory failure with hypoxia (HCC)   Patient Active Problem List    Diagnosis Date Noted    Fungemia 2024    Pancytopenia ([facility]) 2024    Gross hematuria 2024    Drowsiness 2024    Urinary retention 2024    Acute kidney injury (HCC) 2024    Cecal volvulus ([facility]) 2024    Palliative care patient 2024    Goals of care, counseling/discussion 2024    Hypotension 2024    Seizure disorder ([facility]) 2024    Acute respiratory failure with hypoxia ([facility]) 2024    Transaminitis 2024    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    COVID 2024    Stage 3b chronic kidney disease (CKD) ([facility]) 2024    Abnormal CT of the head 2024    Nephrolithiasis 2021    Encephalopathy 2020    Other specified anxiety disorders 2019    Reactive depression 2019    Hidradenitis suppurativa of left axilla 2019    Anxiety state 2018    Disorder of parathyroid gland ([facility]) 2018    Eczema 2018    Grand mal status ([facility]) 2018    Hypercalcemia 2018    Hypertensive disorder 2018    Open wound of hand except fingers 2018    Otitis externa 2018    Overweight 2018    Pain in face 2018    Skin sensation disturbance 2018    Subjective visual disturbance 2018    Long term current use of hormonal contraceptive 10/23/2018    Rosacea 2015      LOS (days): 84  Geometric Mean LOS (GMLOS) (days):   Days to GMLOS:     OBJECTIVE:  Risk of Unplanned Readmission Score: 28.17         Current admission status: Inpatient   Preferred Pharmacy:   [id number] [place] - [address]  [place] [address]  Phone:  [phone] Fax: [phone]    [facility] - [place], [place] - [address]  [address] [facility] [address]  Phone: [phone] Fax: [phone]    Primary Care Provider: Tony Guevara MD    Primary Insurance: [employer]  Secondary Insurance: MEDICARE    DISCHARGE DETAILS:    Discharge planning discussed with:: spouse, Min   Additional Comments: CM met with patient's spouse to discuss LOC assement consent paperwork be signed in order for KATTY to come and evaluate his wife in the event a nursing home choice becomes an option.  He declined signing paperwork, because he does not want her going to a SNF whether it be for a short or long term stay.  Min requested that a casemanager come see him tomorrow after 3:00pm to see when his daughter arrives.  I instructed him to tell the nurse on duty to TT the CM on duty when she arrives so they can meet with them.

## 2024-04-03 NOTE — QUICK NOTE
Wound Care  Attempted Vac change at bedside today. Upon removal of vac with 1 white, 1 black, 1 bridge sponge, and 1 adaptic the midline wound was found to have significant fecal staining. Upon further investigation the skin bridge between the ileostomy and the midline wound was found to be free floating and the ileal mucosa could be visualized through the midline wound. (See photo) Patient continues with melena into the midline wound and the ostomy site.     Plan for OR debridement of wounds tomorrow with likely removal of skin bridge.     Wound packed with 2 saline soaked gauze sponges and covered with vac dressing. Ostomy site covered with vac dressing and appliance.

## 2024-04-03 NOTE — PROGRESS NOTES
"Progress Note - Urology  Carla Hunt 1965, 59 y.o. female MRN: [id number]    Unit/Bed#: MICU 10 Encounter: [id number]    Gross hematuria  Assessment & Plan  Hgb 8.97  Platelets 65, improving  S/p placement of 18 Kazakh three-way Marie catheter  Continue manual irrigation  Clamp cbi and monitor urine color  CT did not demonstrate clot burden within the bladder  Continue to trend labs  No need for urological intervention at this time  Recommend outpatient cystoscopy for gross hematuria once she recovers from this acute episode    Urinary retention  Assessment & Plan  S/p PE Marie catheter insertion 3/27/2024 urinary retention  Voiding trial and outpatient/rehab setting when medically stabilized  Monitor I&O  Creat 1.48           * Acute respiratory failure with hypoxia (HCC)  Assessment & Plan  Patient has tracheostomy on ventilator  Management per critical care/pulmonary team            Subjective: marie catheter draining lt roni urine. No clots noted.         Review of Systems   Unable to perform ROS: Intubated       Objective:    Vitals: Blood pressure 132/61, pulse (!) 138, temperature 98.1 °F (36.7 °C), resp. rate (!) 25, height 5' 8\" (1.727 m), weight 77.3 kg (170 lb 6.7 oz), SpO2 97%.,Body mass index is 25.91 kg/m².  INS & OUTS:  I/O last 24 hours:  In: 2727.5 [I.V.:1050.9; Blood:615; NG/GT:125; IV Piggyback:936.7]  Out: 1175 [Urine:760; Emesis/NG output:220; Stool:195]    Physical Exam  Vitals reviewed.   Constitutional:       Interventions: She is sedated and intubated.   Pulmonary:      Effort: She is intubated.   Genitourinary:     Comments: Marie catheter draining lt roni urine. Manually irrigated. No clots noted        Imaging:  CT ABDOMEN AND PELVIS WITH IV CONTRAST     INDICATION: blood clots in urine, currently doing cbi.     COMPARISON: CT chest/abdomen/pelvis dated 3/10/2024.     TECHNIQUE: CT examination of the abdomen and pelvis was performed. Multiplanar 2D reformatted images were " created from the source data.     This examination, like all CT scans performed in the [facility], was performed utilizing techniques to minimize radiation dose exposure, including the use of iterative reconstruction and automated exposure control. Radiation dose length   product (DLP) for this visit: 705.77 mGy-cm     IV Contrast: 100 mL of iohexol (OMNIPAQUE)  Enteric Contrast: Not administered.     FINDINGS:     ABDOMEN     LOWER CHEST: Persistent bilateral lower lobe airspace consolidation.     LIVER/BILIARY TREE: Unremarkable.     GALLBLADDER: No calcified gallstones. No pericholecystic inflammatory change.     SPLEEN: Unremarkable.     PANCREAS: Unremarkable.     ADRENAL GLANDS: Unremarkable.     KIDNEYS/URETERS: Numerous bilateral renal cysts measuring up to 2.3 cm on the left and other subcentimeter hypodensities which are too small to characterize. Numerous bilateral renal calculi/calcifications redemonstrated probably due to medullary sponge   kidney. No hydronephrosis.     STOMACH AND BOWEL: No evidence for bowel obstruction. The patient is again noted to be status post ileocolectomy with right lower quadrant ileostomy. Associated edema and small foci of subcutaneous emphysema consistent with postsurgical change are not   significantly changed. No focal drainable fluid collection is identified.     APPENDIX: No findings to suggest appendicitis.     ABDOMINOPELVIC CAVITY: Trace ascites. No pneumoperitoneum. No lymphadenopathy.     VESSELS: Unremarkable for patient's age.     PELVIS     REPRODUCTIVE ORGANS: Unremarkable for patient's age.     URINARY BLADDER: Ortiz catheter within the urinary bladder.     ABDOMINAL WALL/INGUINAL REGIONS: Unremarkable.     BONES: No acute fracture or suspicious osseous lesion.     IMPRESSION:     Stable postsurgical changes status post ileocolectomy with right lower quadrant ileostomy with persistent associated inflammatory changes and subcutaneous  emphysema. No focal drainable fluid collection identified. Trace ascites.     Persistent bilateral lower lobe atelectasis.        Workstation performed: [id number]     Imaging reviewed - both report and images personally reviewed.     Labs:  Recent Labs     24  0430 24  0450 24  0837 24  0809   WBC 5.29 5.73 7.03 7.75 7.96 7.68 8.97       Recent Labs     24  0430 24  0450 24  0837 24  0809   HGB 7.3* 6.7* 7.9* 7.6* 6.8* 6.7* 8.1*     Recent Labs     24  0430 24  0450 24  0837 24  0809   HCT 22.6* 20.7* 24.3* 23.1* 20.4* 20.9* 24.2*     Recent Labs     24  0430 24  0802 24  0430   CREATININE 1.24 1.44* 1.48*  1.48*     Lab Results   Component Value Date    HGB 8.1 (L) 2024    HCT 24.2 (L) 2024    WBC 8.97 2024    PLT 65 (L) 2024     Lab Results   Component Value Date     2017    K 3.8 2024    K 3.8 2024     (H) 2024     (H) 2024    CO2 19 (L) 2024    CO2 19 (L) 2024     (H) 2024     (H) 2024    CREATININE 1.48 (H) 2024    CREATININE 1.48 (H) 2024    CALCIUM 10.0 2024    CALCIUM 10.0 2024    GLUCOSE 118 2024       History:    Past Medical History:   Diagnosis Date    Hx of hypercalcemia     Lymphoma (HCC) 2021    Parathyroid disease (HCC)     Seizures (HCC)     Situational depression     24 yr old son  from drug overdose     Past Surgical History:   Procedure Laterality Date    CRANIOTOMY FOR TEMPORAL LOBECTOMY Left     NV LAPS ABD PRTM&OMENTUM DX W/WO SPEC BR/WA SPX N/A 2024    Procedure: LAPAROSCOPY DIAGNOSTIC,EXPLORATORY LAPAROTOMY, RIGHT KARY COLECTOMY,ILEOSTOMY, MUCUS FISTULA;  Surgeon: Lauryn Ullrich, DO;  Location: [facility];   Service: General     Family History   Problem Relation Age of Onset    Diabetes Mother     Cancer Father      Social History     Socioeconomic History    Marital status: /Civil Union     Spouse name: None    Number of children: None    Years of education: None    Highest education level: None   Occupational History    None   Tobacco Use    Smoking status: Never    Smokeless tobacco: Never   Vaping Use    Vaping status: Never Used   Substance and Sexual Activity    Alcohol use: Not Currently    Drug use: Never    Sexual activity: None   Other Topics Concern    None   Social History Narrative    None     Social Determinants of Health     Financial Resource Strain: Not on file   Food Insecurity: No Food Insecurity (1/11/2024)    Hunger Vital Sign     Worried About Running Out of Food in the Last Year: Never true     Ran Out of Food in the Last Year: Never true   Transportation Needs: No Transportation Needs (1/11/2024)    PRAPARE - Transportation     Lack of Transportation (Medical): No     Lack of Transportation (Non-Medical): No   Physical Activity: Not on file   Stress: Not on file   Social Connections: Not on file   Intimate Partner Violence: Not on file   Housing Stability: Low Risk  (1/11/2024)    Housing Stability Vital Sign     Unable to Pay for Housing in the Last Year: No     Number of Places Lived in the Last Year: 1     Unstable Housing in the Last Year: No       The following portions of the patient's history were reviewed and updated as appropriate: allergies, current medications, past family history, past medical history, past social history, past surgical history and problem list    DANYELLE Hansen  Date: 4/3/2024 Time: 5:37 PM

## 2024-04-03 NOTE — PROCEDURES
Procedural Sedation    Date/Time: 4/3/2024 4:03 PM    Performed by: Nigel Escobar DO  Authorized by: Nigel Escobar DO    Immediate pre-procedure details:     Reviewed: vital signs and NPO status      Verified: emergency equipment available, IV patency confirmed and suction available    Procedure details (see MAR for exact dosages):     Sedation start time:  4/3/2024 3:45 PM    Preoxygenation: trache to vent.    Sedation:  Propofol    Analgesia:  Fentanyl    Intra-procedure monitoring:  Blood pressure monitoring, frequent LOC assessments, cardiac monitor and frequent vital sign checks    Intra-procedure events: none      Intra-procedure management: NG tube placement.    Sedation end time:  4/3/2024 4:00 PM    Total sedation time (minutes):  15  Post-procedure details:     Attendance: Constant attendance by certified staff until patient recovered      Recovery: Patient returned to pre-procedure baseline      Patient stable for discharge: Will remain in MICU.      Patient tolerance:  Tolerated well, no immediate complications

## 2024-04-03 NOTE — PROGRESS NOTES
Progress Note - Infectious Disease   Carla Hunt 59 y.o. female MRN: [id number]  [facility] En[facility]: [id number]      Impression/Plan:    1. Acute hypoxic respiratory failure, likely multifactorial, with both COVID and bacterial pneumonia contributing.  Also consider persistent inflammation, fibrosis as seems quite steroid responsive in past.  Most recently extubated 3/1.  Patient with worsening respiratory status requiring intubation again 3/11.  Suspect ongoing hypoxia related to persistent COVID-pneumonia, superimposed bacterial infection, inflammatory component/lung fibrosis related to COVID, now with profound deconditioning and muscle weakness.  Status post bronchoscopy and trach 3/12 with excessive secretions seen from the lower lobes bilaterally.  BAL culture from 3/11 shows heavy growth of Stenotrophomonas maltophilia, Candida albicans.  PJP DFA negative.  Fungitell was positive but patient was on beta-lactam antibiotics and had received multiple blood transfusions.  She was clinically improving and monitor off antifungals. Status post 10 days of vancomycin and cefepime, 14-day course of remdesivir/Paxlovid 3/15, 14-day course of antibiotics with minocycline for stenotrophomonas pneumonia. Status post repeat bronchoscopy 3/17 for airway clearance with continued thick secretions.   Histoplasma urine antigen negative.  Patient was clinically improving and weaned to trach collar but 3/30 had worsening lethargy, again placed on the ventilator 3/31.  Repeat CT chest showed new consolidation in the right upper lobe, CT head unchanged.  Antibiotics restarted.  Sputum culture is now growing stenotrophomonas maltophilia and mixed respiratory lauren.  Course also complicated by candidemia, now fecal contamination from the ileostomy to the midline wound   -Start Zosyn in setting of fecal contamination of wound, possible peritoneal contamination   -Continue minocycline 200 mg twice daily.  Tube feeds  should be held for 1 hour prior to and 1 hour after minocycline administration. E test requested, minocycline is sensitive  -Steroid dosing per critical care, agree with more aggressive wean  -Antifungals as below  -Follow-up cryptococcal serum antigen, aspergillus antigen  -If no improvement in mental status, recommend MRI brain  -If patient again loses enteric access we will switch to IV Eravacycline and Flagyl    2. Candidemia.  2 sets of blood cultures collected 3/31 are growing Candida albicans.  The patient has numerous risk factors for candidemia including presence of midline, abdominal surgery, prolonged hospitalization in the ICU, multiple courses of broad-spectrum antibiotics, leukopenia/recent neutropenia.  TTE no vegetations   -For now, continue IV micafungin 100 mg every 24 hours   -Continue fluconazole 400 mg daily (improved CNS penetration)   -Repeat blood cultures (routine) 4/4   -Recommend ophthalmology evaluation since she is unresponsive, has prolonged immunosuppression and neutropenia   -Consideration for SATURNINO depending on clinical course and follow-up cultures    3.  Sepsis, recurrent since admission.  Due to COVID-19 infection, recurrent pneumonias, cecal volvulus status post surgery and wound dehiscence, now with candidemia, fecal contamination of midline wound   -Antibiotics and antifungals as above  -Follow temperatures closely  -Recheck WBC in AM to monitor infection  -Supportive care as per the primary service     3. Recurrent bacterial pneumonia.  The patient has completed multiple treatment courses over the hospitalization. Prior BAL cultures with Pseudomonas and stenotrophomonas.  Unclear if pathogens or colonizers.  Status post 10 days levofloxacin.  CT C/A/P showed evolving changes likely all due to sequelae of COVID, infections.  Noted to have worsening hypoxia and purulent secretions on bronchoscopy 3/11.  BAL culture with heavy growth of Stenotrophomonas maltophilia, Candida.   Completed 14-day course of minocycline 3/27.  CT chest now shows new right upper lobe infiltrate and sputum culture is again growing Stenotrophomonas maltophilia.  Given worsening clinical decline and new infiltrate we will continue stenotrophomonas treatment for now              -Antibiotics as above              -Wean O2 as able     4. Severe COVID, present on admission.  Patient was treated with a 10-day course of remdesivir, dexamethasone and was given 1 dose of Tocilizumab on admission.  COVID antigen was negative prior to coming off isolation.  Had positive COVID PCR from BAL 2/16, status post another 5-day course of remdesivir.  Patient has remained on high-dose systemic corticosteroid throughout her hospitalization.  COVID antigen positive and PCR is also positive 3/3 and Ct 26.8, consistent with high viral replication.  Repeat PCR positive with Ct closer to 30. Status post 14-day course of remdesivir/Paxlovid 3/15/2024.  Rapid COVID antigen negative 3/25 and 3/27  -Steroid wean per ICU  -No additional antivirals indicated  - Bactrim for PJP prophylaxis     5. 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  Patient is status post exploratory laparotomy with ileocecectomy and end ileostomy creation 2/20.   End ileostomy is with ongoing ischemic changes which is likely contributing to the imaging findings and ongoing SIRS response.  Now with wound dehiscence status post staple removal, status post wound VAC placement 3/13, removed but replaced due to bleeding.  Now with breakdown of ileostomy and fecal contamination through midline wound  -Serial exams  -Surgery follow-up   -Started on Zosyn due to possible peritoneal contamination and severe immunosuppression   -Surgery planning for or debridement of wounds tomorrow     B-cell lymphoma, on maintenance rituxan, which is currently postponed.  Rituximab is a risk factor for persistent COVID infection.    7.  ELIESER.  Likely multifactorial due to prerenal  status, medications.  With worsening BUN and creatinine.  Concern GI bleed make a contributing to high BUN.  Would also consider if this is attributing to worsening mental status.   -Monitor creatinine closely   -Dose adjust antibiotics as needed   -Nephrology has been consulted    8.  Anemia, thrombocytopenia, lymphopenia/neutropenia.  Patient has had persistent lymphopenia throughout this admission, likely due to rituximab, viral infection, high-dose steroids.  Now with worsening anemia, neutropenia, and thrombocytopenia.  Bactrim discontinued 3/18. CMV PCR negative.  Not a likely side effect of minocycline.  Immunoglobulins are low.  Started on Granix 3/27, white blood cell count now improved.  May be seen in IRIS type response with multiple underlying infections   -Steroid wean per primary   -Transfusion support per primary   -Hematology follow-up   -Monitor CBC    9.  GI bleed, ulcerations.  Status post EGD yesterday which showed erosive gastritis, esophageal tear with oozing, blood and extensive clot in the esophagus and stomach, small ulcers and trauma throughout the stomach.  Status post repeat EGD today   -Follow-up pathology, viral studies   -Hold on initiation of empiric antivirals    Above management plan to continue antibiotics/antifungals with Dr. Bowden of critical care. Discussed with the patient's  at bedside.  ID will follow.    Antibiotics:  Fluconazole  Micafungin  Minocycline    Subjective:  Per report the patient was more responsive this morning but by the time I saw her this afternoon she was again lethargic.  On wound VAC change by surgery today, there was noted to be breakdown of the ileostomy through the midline wound and fecal contamination of the wound.  She is afebrile.  Status post EGD yesterday which showed ulcerations in the stomach and esophagus, active bleeding.  Objective:  Vitals:  Temp:  [97.7 °F (36.5 °C)-98.5 °F (36.9 °C)] 98.1 °F (36.7 °C)  HR:  [117-138] 138  Resp:   [22-35] 25  BP: (112-152)/(58-79) 132/61  SpO2:  [95 %-100 %] 97 %  Temp (24hrs), Av °F (36.7 °C), Min:97.7 °F (36.5 °C), Max:98.5 °F (36.9 °C)  Current: Temperature: 98.1 °F (36.7 °C)    Physical Exam:   General Appearance:  Ill-appearing, lethargic   Neck: Trach in place.   Lungs:   Minimal rhonchi bilaterally   Heart:  RRR; no murmur, rub or gallop   Abdomen:   Midline ileostomy, wound contaminated by stool   Extremities: No edema   Skin: No new rashes or lesions.        Labs:   All pertinent labs and imaging studies were personally reviewed  Results from last 7 days   Lab Units 24  0809 243 24   WBC Thousand/uL 8.97 7.68 7.96   HEMOGLOBIN g/dL 8.1* 6.7* 6.8*   PLATELETS Thousands/uL 65* 46* 45*     Results from last 7 days   Lab Units 24  0430 24  0802 24  0430 24  0545 24  0454   SODIUM mmol/L 145  145 141 143   < > 141   POTASSIUM mmol/L 3.8  3.8 4.4 3.6   < > 3.5   CHLORIDE mmol/L 109*  109* 110* 109*   < > 112*   CO2 mmol/L 19*  19* 20* 21   < > 18*   BUN mg/dL 103*  103* 105* 104*   < > 61*   CREATININE mg/dL 1.48*  1.48* 1.44* 1.24   < > 0.70   EGFR ml/min/1.73sq m 38  38 40 47   < > 95   CALCIUM mg/dL 10.0  10.0 10.3* 10.6*   < > 10.0   AST U/L 16  --   --   --  12*   ALT U/L 29  --   --   --  45   ALK PHOS U/L 210*  --   --   --  196*    < > = values in this interval not displayed.     Results from last 7 days   Lab Units 24  1431 24  0545   PROCALCITONIN ng/ml 7.19* 6.28*       Results from last 7 days   Lab Units 24  0545   CRP mg/L 331.2*       Imaging:  CT chest with new right upper lobe consolidation

## 2024-04-03 NOTE — PROGRESS NOTES
Progress Note - Nephrology   Carla Hunt 59 y.o. female MRN: [id number]  Unit/Bed#: MICU 10 Encounter: [id number]      Assessment / Plan:  ELIESER, recurrent, in the setting of concern for upper GI bleed.  Patient had prior ELIESER with component of ATN and Bactrim use  -Serum creatinine had improved down to 0.83, has steadily risen since March 29 up to 1.48 with rising BUN above 100.   -I suspect ATN/hypotension/GI bleed all contributing to recurrent ELIESER  -did receive 1L IVF bolus without improvement  -for repeat EGD Today. Bleeding ulcers noted  -repeat UA and urine lytes  -monitor UOP closely  -remains on bactrim which can cause decreased sCr excretion/higher sCr levels. Had been switched to minocycline but back on bactrim  -b/l sCr 0.8-1.2  -did receive IV dye 4/1/24 which could contribute to ELIESER as well  High normal sNa with history of hypernatremia - is now NPO, previously received D5W. May need this restarted if sNa continues to rise, monitor BMP  Elevated anion gap acidosis likely d/t ELIESER - AG 17, bicarb 19, consider bicarbonate infusion if needed for volume resuscitation, monitor BMP  Azotemia with possible uremia - patient lethargic today. Hopeful once GI bleed addressed, and steroids tapered, BUN improves along with mental status. Monitor this. No urgent RRT needs  Hypercalcemia - corrected calcium 11.3, last PTH 71.4, UPEP negative in the past, ? D/t immobility. Unimproved s/p IVF. Hypercalcemia can contribute to ELIESER from vasoconstrictive effect  Hyperphosphatemia in setting of ELIESER/decreased clearance - expect improvement in phos once ELIESER resolves. Avoid binders for now  Anemia in setting of GIB and B cell lymphoma - Hgb improved to 8.1 s/p blood transfusion, GI consulted, for repeat EGD today  Acute hypoxic respiratory failure status post tracheostomy March 12, 2024  Candida fungemia-on Diflucan and micafungin IV per primary team  Encephalopathy with SAH and history of seizure disorder - SAH  "resolved, per primary team        Subjective:   -Suspect this patient is on trach collar 40% FiO2.  She is encephalopathic.   is updated at bedside.      Objective:     Vitals: Blood pressure 134/68, pulse (!) 133, temperature 97.7 °F (36.5 °C), temperature source Axillary, resp. rate (!) 27, height 5' 8\" (1.727 m), weight 77.3 kg (170 lb 6.7 oz), SpO2 97%.,Body mass index is 25.91 kg/m².Temp (24hrs), Av.2 °F (36.8 °C), Min:97.7 °F (36.5 °C), Max:99.1 °F (37.3 °C)      Weight (last 2 days)       Date/Time Weight    24 0600 77.3 (170.42)    24 0558 73 (160.94)    24 1529 70.3 (155)    24 0556 70.4 (155.2)              Intake/Output Summary (Last 24 hours) at 4/3/2024 1158  Last data filed at 4/3/2024 1000  Gross per 24 hour   Intake 2226.59 ml   Output 995 ml   Net 1231.59 ml     I/O last 24 hours:  In: 2354.5 [I.V.:1047.9; Blood:615; NG/GT:125; IV Piggyback:566.7]  Out: 1175 [Urine:760; Emesis/NG output:220; Stool:195]        Physical Exam:   Physical Exam  Vitals and nursing note reviewed.   Constitutional:       General: She is not in acute distress.     Appearance: Normal appearance. She is well-developed. She is not diaphoretic.   HENT:      Head: Normocephalic and atraumatic.      Mouth/Throat:      Pharynx: No oropharyngeal exudate.   Eyes:      General: No scleral icterus.        Right eye: No discharge.         Left eye: No discharge.   Neck:      Thyroid: No thyromegaly.      Comments: trach  Cardiovascular:      Rate and Rhythm: Normal rate and regular rhythm.      Heart sounds: No murmur heard.  Pulmonary:      Effort: Pulmonary effort is normal. No respiratory distress.      Breath sounds: Normal breath sounds. No wheezing.   Abdominal:      General: Bowel sounds are normal. There is no distension.      Palpations: Abdomen is soft.      Comments: RUQ ileostomy   Genitourinary:     Comments: +Ortiz, CBI  Musculoskeletal:         General: No swelling.   Skin:     General: " Skin is warm and dry.      Findings: No rash.   Neurological:      Motor: No abnormal muscle tone.      Comments: Intubated/sedated   Psychiatric:      Comments: Unable to assess as patient intubated/sedated         Invasive Devices       Peripheral Intravenous Line  Duration             Peripheral IV 04/01/24 Right;Ventral (anterior) Forearm 1 day    Peripheral IV 04/02/24 Right;Upper;Ventral (anterior);Medial Arm <1 day              Drain  Duration             Ileostomy RUQ 42 days    Continuous Bladder Irrigation Three-way 3 days    Urethral Catheter Three way 18 Fr. 2 days              Airway  Duration             Surgical Airway Shiley Cuffed 21 days                    Medications:    Scheduled Meds:  Current Facility-Administered Medications   Medication Dose Route Frequency Provider Last Rate    acetaminophen  650 mg Oral Q6H PRN Moises Bowden MD      chlorhexidine  15 mL Mouth/Throat Q12H [facility] Moises Bowden MD      fentaNYL  25 mcg Intravenous Q1H PRN Joe Lazcano DO      fluconazole  400 mg Intravenous Q24H Betzaida Sr  mg (04/03/24 0820)    insulin regular (HumuLIN R,NovoLIN R) 1 Units/mL in sodium chloride 0.9 % 100 mL infusion  0.3-21 Units/hr Intravenous Titrated Ajit Oquendo MD 0.5 Units/hr (04/03/24 0157)    lacosamide  100 mg Intravenous Q12H Mahamed Cardenas DO      levalbuterol  1.25 mg Nebulization TID Moises Bowden MD      methylPREDNISolone sodium succinate  30 mg Intravenous Daily Emilie Soyeon Kim, MD      micafungin  100 mg Intravenous Q24H Naima Bhatia MD Stopped (04/02/24 2100)    nystatin   Topical BID Moises Bowden MD      ondansetron  4 mg Intravenous Q6H PRN Moises Bowden MD      pantoprazole  40 mg Intravenous Q12H [facility] Moises Bowden MD      polyethylene glycol  17 g Per NG Tube Daily Moises Bowden MD      sodium chloride  1 Application Nasal Q1H PRN Moises Bowden MD      sodium chloride  2 spray Each Nare BID Emilie Soyeon Kim, MD      sodium chloride  4 mL Nebulization TID Moises Bowden MD       sulfamethoxazole-trimethoprim  160 mg of trimethoprim Intravenous Once per day on Monday Wednesday Saturday Meghan Guzman  mg of trimethoprim (04/03/24 1055)       PRN Meds:.  acetaminophen    fentaNYL    ondansetron    sodium chloride    Continuous Infusions:insulin regular (HumuLIN R,NovoLIN R) 1 Units/mL in sodium chloride 0.9 % 100 mL infusion, 0.3-21 Units/hr, Last Rate: 0.5 Units/hr (04/03/24 0157)            LAB RESULTS:      Results from last 7 days   Lab Units 04/03/24  0809 04/03/24  0430 04/02/24  2153 04/02/24 2007 04/02/24  0837 04/02/24  0802 04/02/24  0450 04/01/24 2013 04/01/24  0430 03/31/24  1235 03/31/24  0545 03/31/24  0044 03/30/24  0454 03/29/24  1752 03/29/24  0556 03/28/24  0532 03/28/24  0532   WBC Thousand/uL 8.97  --  7.68 7.96 7.75  --  7.03 5.73 5.29  --  2.47* 2.18* 2.72*  --  1.15*  --  1.07*   HEMOGLOBIN g/dL 8.1*  --  6.7* 6.8* 7.6*  --  7.9* 6.7* 7.3*   < > 6.1* 6.5* 7.6*  --  7.6*  --  7.8*   HEMATOCRIT % 24.2*  --  20.9* 20.4* 23.1*  --  24.3* 20.7* 22.6*   < > 19.5* 20.6* 23.6*  --  23.9*  --  24.6*   PLATELETS Thousands/uL 65*  --  46* 45* 64*  --  66* 45* 36*  --  51* 50* 21*  --  22*  --  34*   LYMPHO PCT % 0*  --   --  1*  --   --  1*  --  2*  --  1* 2* 2*  --  1*   < > 5   MONO PCT % 4  --   --  2*  --   --  0*  --  6  --  9 5 8  --  6   < > 10   EOS PCT % 0  --   --  0  --   --  0  --  0  --  1 0 0  --  0   < >  --    POTASSIUM mmol/L  --  3.8  3.8  --   --   --  4.4  --   --  3.6  --  3.7  --  3.5 3.6 3.5  --  3.1*   CHLORIDE mmol/L  --  109*  109*  --   --   --  110*  --   --  109*  --  108  --  112* 109* 110*  --  114*   CO2 mmol/L  --  19*  19*  --   --   --  20*  --   --  21  --  21  --  18* 19* 18*  --  19*   BUN mg/dL  --  103*  103*  --   --   --  105*  --   --  104*  --  78*  --  61* 52* 56*  --  62*   CREATININE mg/dL  --  1.48*  1.48*  --   --   --  1.44*  --   --  1.24  --  0.96  --  0.70 0.65 0.69  --  0.83   CALCIUM mg/dL  --  10.0  10.0  --   --    "--  10.3*  --   --  10.6*  --  10.2  --  10.0 9.8 9.5  --  9.7   ALK PHOS U/L  --  210*  --   --   --   --   --   --   --   --   --   --  196*  --   --   --   --    ALT U/L  --  29  --   --   --   --   --   --   --   --   --   --  45  --   --   --   --    AST U/L  --  16  --   --   --   --   --   --   --   --   --   --  12*  --   --   --   --    MAGNESIUM mg/dL  --  2.5  --   --   --  2.4  --   --  2.5  --  2.6  --  2.8*  --  1.7*  --  2.0   PHOSPHORUS mg/dL  --  6.9*  --   --   --  6.0*  --   --  5.5*  --  5.5*  --  4.2  --  3.2  --  3.9    < > = values in this interval not displayed.       CUTURES:  Lab Results   Component Value Date    BLOODCX Candida albicans (A) 03/31/2024    BLOODCX Josephine albicans (A) 03/31/2024    BLOODCX No Growth After 5 Days. 02/26/2024    BLOODCX No Growth After 5 Days. 02/26/2024    BLOODCX No Growth After 5 Days. 02/17/2024    BLOODCX No Growth After 5 Days. 02/17/2024    BLOODCX No Growth After 5 Days. 01/25/2024    BLOODCX No Growth After 5 Days. 01/25/2024    URINECX 60,000-69,000 cfu/ml Lactobacillus species (A) 01/07/2024    URINECX <10,000 cfu/ml 12/26/2023    URINECX 20,000-29,000 cfu/ml Escherichia coli (A) 10/30/2020                 Portions of the record may have been created with voice recognition software. Occasional wrong word or \"sound a like\" substitutions may have occurred due to the inherent limitations of voice recognition software. Read the chart carefully and recognize, using context, where substitutions have occurred.If you have any questions, please contact the dictating provider.    "

## 2024-04-03 NOTE — UTILIZATION REVIEW
"Continued Stay Review    Date: 04/03                          Current Patient Class: IP  Current Level of Care: Level 2 stepdown/HOT    HPI:59 y.o. female initially admitted on 01/10     Assessment/Plan: Pt's hgb 6.8->6.7, plts 45-46 yesterday. Received 1 u prbc an d1 u pts. Hgb 8.1, plts 65 this am. Plan for bedside EGD today. Plan for NGT placement. INR continuing to rise, will give 1 unit of FFP. NPO for now, not a candidate fo TPN d/t fungeminia. Check LFTs, ketones. Continue micafungin and fluconazole.  Taper steroids to 20mg daily tomorrow and 10mg on Friday. Cont Insulin gtt. Remains on MV w/ TC. Stoma site is now draining stool into mid abdominal wound- surg to eval. Started Zosyn. Pending gastric biopsy for CMV, will request HSV samples today. Pending quantiferon gold and repeat cultures tomorrow.    Vital Signs: /70   Pulse (!) 144   Temp 98.6 °F (37 °C) (Axillary)   Resp (!) 30   Ht 5' 8\" (1.727 m)   Wt 77.3 kg (170 lb 6.7 oz)   SpO2 95%   BMI 25.91 kg/m²       Pertinent Labs/Diagnostic Results:       Results from last 7 days   Lab Units 04/03/24  0809 04/02/24  2153 04/02/24 2007 04/02/24  0837 04/02/24  0450 03/29/24  0556 03/28/24  0532   WBC Thousand/uL 8.97 7.68 7.96 7.75 7.03   < > 1.07*   HEMOGLOBIN g/dL 8.1* 6.7* 6.8* 7.6* 7.9*   < > 7.8*   HEMATOCRIT % 24.2* 20.9* 20.4* 23.1* 24.3*   < > 24.6*   PLATELETS Thousands/uL 65* 46* 45* 64* 66*   < > 34*   TOTAL NEUT ABS Thousand/uL  --   --   --   --   --   --  0.88*   BANDS PCT % 2  --  44*  --  41*   < > 21*    < > = values in this interval not displayed.         Results from last 7 days   Lab Units 04/03/24  0430 04/02/24  0802 04/01/24  0430 03/31/24  0545 03/30/24  0454   SODIUM mmol/L 145  145 141 143 141 141   POTASSIUM mmol/L 3.8  3.8 4.4 3.6 3.7 3.5   CHLORIDE mmol/L 109*  109* 110* 109* 108 112*   CO2 mmol/L 19*  19* 20* 21 21 18*   ANION GAP mmol/L 17*  17* 11 13 12 11   BUN mg/dL 103*  103* 105* 104* 78* 61*   CREATININE " mg/dL 1.48*  1.48* 1.44* 1.24 0.96 0.70   EGFR ml/min/1.73sq m 38  38 40 47 65 95   CALCIUM mg/dL 10.0  10.0 10.3* 10.6* 10.2 10.0   MAGNESIUM mg/dL 2.5 2.4 2.5 2.6 2.8*   PHOSPHORUS mg/dL 6.9* 6.0* 5.5* 5.5* 4.2     Results from last 7 days   Lab Units 04/03/24  1330 04/03/24  0430 03/30/24  0454   AST U/L  --  16 12*   ALT U/L  --  29 45   ALK PHOS U/L  --  210* 196*   TOTAL PROTEIN g/dL  --  5.1* 4.9*   ALBUMIN g/dL  --  2.4* 2.1*   TOTAL BILIRUBIN mg/dL  --  1.22* 0.68   AMMONIA umol/L 28  --   --      Results from last 7 days   Lab Units 04/03/24  1736 04/03/24  1404 04/03/24  1151 04/03/24  1010 04/03/24  0807 04/03/24  0624 04/03/24  0354 04/03/24  0157 04/03/24  0002 04/02/24  2228 04/02/24  1959 04/02/24  1808   POC GLUCOSE mg/dl 154* 166* 157* 138 131 135 126 133 141* 133 135 140     Results from last 7 days   Lab Units 04/03/24  0430 04/02/24  0802 04/01/24  0430 03/31/24  0545 03/30/24  0454 03/29/24  1752 03/29/24  0556 03/28/24  0532   GLUCOSE RANDOM mg/dL 142*  142* 125 131 105 147* 134 257* 201*             Beta- Hydroxybutyrate   Date Value Ref Range Status   04/03/2024 1.10 (H) 0.02 - 0.27 mmol/L Final          Results from last 7 days   Lab Units 04/01/24  0430 03/31/24  1235 03/31/24  0854   PH NICA  7.249* 7.305 7.238*   PCO2 NICA mm Hg 46.6 40.0* 52.5*   PO2 NICA mm Hg 47.6* 75.4* 67.7*   HCO3 NICA mmol/L 19.9* 19.5* 21.9*   BASE EXC NICA mmol/L -6.9 -6.4 -5.2   O2 CONTENT NICA ml/dL 9.7 11.1 8.6   O2 HGB, VENOUS % 78.6 93.8* 89.9*         Results from last 7 days   Lab Units 04/03/24  0430 03/29/24  1752   CK TOTAL U/L 24* 15*             Results from last 7 days   Lab Units 04/03/24  0430 04/01/24  1553   PROTIME seconds 22.0* 20.1*   INR  1.96* 1.75*   PTT seconds  --  35     Results from last 7 days   Lab Units 04/01/24  1431   TSH 3RD GENERATON uIU/mL 0.039*     Results from last 7 days   Lab Units 04/01/24  1431 03/31/24  0545   PROCALCITONIN ng/ml 7.19* 6.28*     Results from last 7 days    Lab Units 04/03/24  0805   LACTIC ACID mmol/L 1.0                         Results from last 7 days   Lab Units 04/03/24  1307 04/03/24  0555 04/02/24  2346 04/02/24  0555 04/01/24  0555 03/31/24  0555   UNIT PRODUCT CODE  C5106Q06 [id number]  W5279R08 B5887O40 D5100O33  I6376S99  W1030R11 O0921B14 [id number]   UNIT NUMBER  [id number]  [id number]  [id number]  [id number]   UNITABO  A O  A A O  A  O A A   UNITRH  NEG POS  POS NEG POS  NEG  POS NEG POS   CROSSMATCH   --   --  Compatible Compatible Compatible  --    UNIT DISPENSE STATUS  Issued Presumed Trans  Presumed Trans Crossmatched Presumed Trans  Presumed Trans  Presumed Trans Presumed Trans Presumed Trans   UNIT PRODUCT VOL ml 250 268  280 350 253  350  300 350 300             Results from last 7 days   Lab Units 03/31/24  0545   CRP mg/L 331.2*             Results from last 7 days   Lab Units 04/03/24  1416 03/29/24  1755   CLARITY UA  Turbid Turbid   COLOR UA  Yellow Light Orange   SPEC GRAV UA  1.017 1.009   PH UA  5.5 6.0   GLUCOSE UA mg/dl Negative Negative   KETONES UA mg/dl Negative Negative   BLOOD UA  Large* Large*   PROTEIN UA mg/dl 100 (2+)* 50 (1+)*   NITRITE UA  Negative Negative   BILIRUBIN UA  Negative Negative   UROBILINOGEN UA (BE) mg/dl <2.0 <2.0   LEUKOCYTES UA  Large* Small*   WBC UA /hpf Innumerable* Innumerable*   RBC UA /hpf Innumerable* Innumerable*   BACTERIA UA /hpf Occasional None Seen   EPITHELIAL CELLS WET PREP /hpf None Seen Occasional   MUCUS THREADS   --  Occasional*   SODIUM UR  36  --    CREATININE UR mg/dL 15.8  --                                  Results from last 7 days   Lab Units 03/31/24  1450 03/31/24  1437 03/31/24  1046   BLOOD CULTURE  Candida albicans* Candida albicans*  --    SPUTUM CULTURE   --   --  3+ Growth of Stenotrophomonas maltophilia*  2+ Growth of   GRAM STAIN RESULT  Budding yeast* Yeast* 4+  Gram negative rods*  No polys seen*     Results from last 7 days   Lab Units 03/28/24  0532   TOTAL COUNTED  100         Results from last 7 days   Lab Units 04/02/24  0441   VANCOMYCIN RM ug/mL 38.3*       Medications:   Scheduled Medications:  chlorhexidine, 15 mL, Mouth/Throat, Q12H SARTHAK  [START ON 4/4/2024] fluconazole, 400 mg, Intravenous, Q24H  lacosamide, 100 mg, Intravenous, Q12H  levalbuterol, 1.25 mg, Nebulization, TID  methylPREDNISolone sodium succinate, 30 mg, Intravenous, Daily  micafungin, 100 mg, Intravenous, Q24H  minocycline, 200 mg, Oral, Q12H SARTHAK  nystatin, , Topical, BID  pantoprazole, 40 mg, Intravenous, Q12H SARTHAK  piperacillin-tazobactam, 4.5 g, Intravenous, Q8H  polyethylene glycol, 17 g, Per NG Tube, Daily  sodium chloride, 2 spray, Each Nare, BID  sodium chloride, 4 mL, Nebulization, TID  sulfamethoxazole-trimethoprim, 160 mg of trimethoprim, Intravenous, Once per day on Monday Wednesday Saturday      Continuous IV Infusions:  insulin regular (HumuLIN R,NovoLIN R) 1 Units/mL in sodium chloride 0.9 % 100 mL infusion, 0.3-21 Units/hr, Intravenous, Titrated      PRN Meds:  acetaminophen, 650 mg, Oral, Q6H PRN  fentaNYL, 25 mcg, Intravenous, Q1H PRN  ondansetron, 4 mg, Intravenous, Q6H PRN  sodium chloride, 1 Application, Nasal, Q1H PRN        Discharge Plan: [facility]    Network Utilization Review Department  ATTENTION: Please call with any questions or concerns to [phone] and carefully listen to the prompts so that you are directed to the right person. All voicemails are confidential.   For Discharge needs, contact Care Management DC Support Team at [phone] opt. 2  Send all requests for admission clinical reviews, approved or denied determinations and any other requests to dedicated fax number below belonging to the campus where the patient is receiving treatment. List of dedicated fax numbers for the Facilities:  FACILITY NAME UR FAX NUMBER   ADMISSION DENIALS (Administrative/Medical  Necessity) [phone]   DISCHARGE SUPPORT TEAM (NETWORK) [phone]   PARENT CHILD HEALTH (Maternity/NICU/Pediatrics) [phone]   [facility] [phone]   General acute hospital [phone]   [facility] [phone]   [facility] [phone]   [facility] [phone]   General acute hospital [phone]   [facility] [phone]   [facility] [phone]   [facility] [phone]   [facility] [phone]   [facility] [phone]   [facility] [phone]   ,

## 2024-04-03 NOTE — RESPIRATORY THERAPY NOTE
04/03/24 0732   Respiratory Assessment   Assessment Type Assess only   General Appearance Sedated   Respiratory Pattern Tachypneic;Assisted   Chest Assessment Chest expansion symmetrical   Bilateral Breath Sounds Coarse   Suction Trach   Resp Comments Pt continues on documented vent settings. All necessary [id number] at bedside. Treatments given as sceduled. No changes at thius time   Vent Information   Vent type Summers G5   Summers Vent Mode APVcmv   SpO2 99 %   APVcmv Settings   Resp Rate (BPM) 16 BPM   VT (mL) 450 mL   %TI (%) 0.9 %   Insp Time (sec) 0.8 sec   FIO2 (%) 40 %   PEEP (cmH2O) 6 cmH2O   I:E Ratio 1/3.7   Insp Resistance 2   P-ramp (ms) 100 (ms)   Flow Trigger (LPM) 3 LPM   Humidification Heater   Heater Temp 95 °F (35 °C)   APVcmv Actuals   Resp Rate (BPM) 32 BPM   VT (mL) 475 mL   MV (Obs) 14.7   MAP (cmH2O) 13 cmH2O   Peak Pressure (cmH2O) 26 cmH2O   I:E Ratio (Obs) 1/1.5   Static Compliance (mL/cmH20) 15 mL/cmH2O   Plateau Pressure (cm H2O) 21.2 cm H2O   Heater Temperature (Obs) 95 °F (35 °C)   APVcmv ALARMS   High Peak Pressure (cmH2O) 45 cmH2O   Low Peak Pressure (cmH2O) 5 cm H2O   High Resp Rate (BPM) 40 BPM   High MV (L/min) 8 L/min   Low MV (L/min) 5 L/min   High VT (mL) 25 mL   Low VT (mL) 200 mL   Apnea Time (s) 20 S   Maintenance   Alarm (pink) cable attached No   Resuscitation bag with peep valve at bedside Yes   Water bag changed No   Circuit changed No   Daily Screen   Patient safety screen outcome: Failed   Not Ready for Weaning due to: Underline problem not resolved   IHI Ventilator Associated Pneumonia Bundle   Head of Bed Elevated HOB 30   Surgical Airway Shiley Cuffed   Placement Date/Time: 03/12/24 1200   Tube Size: 8 mm  Type: Tracheostomy  Brand: Naveen  Style: Cuffed   Status Secured;Cuff Inflated   Site Assessment Clean;Dry   Ties Assessment Clean;Dry;Intact;Secure   Surgical Airway Cuff Pressure (color) Green   Equipment at bedside BVM;Wall Suction setup;Additional  complete same size trach tube;Additional complete one size smaller trach tube;Obturator;Additional inner cannula;Sterile saline

## 2024-04-04 ENCOUNTER — ANESTHESIA EVENT (INPATIENT)
Dept: PERIOP | Facility: HOSPITAL | Age: 59
DRG: 870 | End: 2024-04-04
Payer: COMMERCIAL

## 2024-04-04 ENCOUNTER — ANESTHESIA (INPATIENT)
Dept: PERIOP | Facility: HOSPITAL | Age: 59
DRG: 870 | End: 2024-04-04
Payer: COMMERCIAL

## 2024-04-04 LAB
ABO GROUP BLD BPU: NORMAL
ABO GROUP BLD: NORMAL
ALBUMIN SERPL BCP-MCNC: 2.2 G/DL (ref 3.5–5)
ALP SERPL-CCNC: 381 U/L (ref 34–104)
ALT SERPL W P-5'-P-CCNC: 34 U/L (ref 7–52)
ANION GAP SERPL CALCULATED.3IONS-SCNC: 11 MMOL/L (ref 4–13)
ANION GAP SERPL CALCULATED.3IONS-SCNC: 13 MMOL/L (ref 4–13)
ANION GAP SERPL CALCULATED.3IONS-SCNC: 16 MMOL/L (ref 4–13)
ANION GAP SERPL CALCULATED.3IONS-SCNC: 16 MMOL/L (ref 4–13)
ANISOCYTOSIS BLD QL SMEAR: PRESENT
APTT PPP: 40 SECONDS (ref 23–37)
AST SERPL W P-5'-P-CCNC: 21 U/L (ref 13–39)
BACTERIA UR QL AUTO: ABNORMAL /HPF
BASE EX.OXY STD BLDV CALC-SCNC: 90.3 % (ref 60–80)
BASE EX.OXY STD BLDV CALC-SCNC: 94.7 % (ref 60–80)
BASE EXCESS BLDV CALC-SCNC: -8.1 MMOL/L
BASE EXCESS BLDV CALC-SCNC: -8.7 MMOL/L
BASOPHILS # BLD MANUAL: 0 THOUSAND/UL (ref 0–0.1)
BASOPHILS # BLD MANUAL: 0 THOUSAND/UL (ref 0–0.1)
BASOPHILS # BLD MANUAL: 0.12 THOUSAND/UL (ref 0–0.1)
BASOPHILS NFR MAR MANUAL: 0 % (ref 0–1)
BASOPHILS NFR MAR MANUAL: 0 % (ref 0–1)
BASOPHILS NFR MAR MANUAL: 1 % (ref 0–1)
BILIRUB DIRECT SERPL-MCNC: 0.96 MG/DL (ref 0–0.2)
BILIRUB SERPL-MCNC: 1.46 MG/DL (ref 0.2–1)
BILIRUB UR QL STRIP: NEGATIVE
BLD GP AB SCN SERPL QL: NEGATIVE
BPU ID: NORMAL
BUDDING YEAST: PRESENT
BUN SERPL-MCNC: 102 MG/DL (ref 5–25)
BUN SERPL-MCNC: 109 MG/DL (ref 5–25)
BUN SERPL-MCNC: 116 MG/DL (ref 5–25)
BUN SERPL-MCNC: 120 MG/DL (ref 5–25)
CA-I BLD-SCNC: 1.34 MMOL/L (ref 1.12–1.32)
CA-I BLD-SCNC: 1.38 MMOL/L (ref 1.12–1.32)
CALCIUM SERPL-MCNC: 10.1 MG/DL (ref 8.4–10.2)
CALCIUM SERPL-MCNC: 10.3 MG/DL (ref 8.4–10.2)
CALCIUM SERPL-MCNC: 9.4 MG/DL (ref 8.4–10.2)
CALCIUM SERPL-MCNC: 9.7 MG/DL (ref 8.4–10.2)
CFFMA (FUNCTIONAL FIBRINOGEN MAX AMPLITUDE): >52 MM (ref 15–32)
CHLORIDE SERPL-SCNC: 112 MMOL/L (ref 96–108)
CHLORIDE SERPL-SCNC: 112 MMOL/L (ref 96–108)
CHLORIDE SERPL-SCNC: 113 MMOL/L (ref 96–108)
CHLORIDE SERPL-SCNC: 115 MMOL/L (ref 96–108)
CKLY30: 0 % (ref 0–2.6)
CKR(REACTION TIME): 10.8 MIN (ref 4.6–9.1)
CLARITY UR: ABNORMAL
CO2 SERPL-SCNC: 18 MMOL/L (ref 21–32)
CO2 SERPL-SCNC: 19 MMOL/L (ref 21–32)
CO2 SERPL-SCNC: 20 MMOL/L (ref 21–32)
CO2 SERPL-SCNC: 21 MMOL/L (ref 21–32)
COLOR UR: YELLOW
CREAT SERPL-MCNC: 1.45 MG/DL (ref 0.6–1.3)
CREAT SERPL-MCNC: 1.56 MG/DL (ref 0.6–1.3)
CREAT SERPL-MCNC: 1.63 MG/DL (ref 0.6–1.3)
CREAT SERPL-MCNC: 1.65 MG/DL (ref 0.6–1.3)
CRTMA(RAPIDTEG MAX AMPLITUDE): 72.9 MM (ref 52–70)
DOHLE BOD BLD QL SMEAR: PRESENT
DOHLE BOD BLD QL SMEAR: PRESENT
EOSINOPHIL # BLD MANUAL: 0 THOUSAND/UL (ref 0–0.4)
EOSINOPHIL NFR BLD MANUAL: 0 % (ref 0–6)
ERYTHROCYTE [DISTWIDTH] IN BLOOD BY AUTOMATED COUNT: 18.6 % (ref 11.6–15.1)
ERYTHROCYTE [DISTWIDTH] IN BLOOD BY AUTOMATED COUNT: 18.8 % (ref 11.6–15.1)
ERYTHROCYTE [DISTWIDTH] IN BLOOD BY AUTOMATED COUNT: 19.1 % (ref 11.6–15.1)
ERYTHROCYTE [DISTWIDTH] IN BLOOD BY AUTOMATED COUNT: 19.1 % (ref 11.6–15.1)
GFR SERPL CREATININE-BSD FRML MDRD: 33 ML/MIN/1.73SQ M
GFR SERPL CREATININE-BSD FRML MDRD: 34 ML/MIN/1.73SQ M
GFR SERPL CREATININE-BSD FRML MDRD: 36 ML/MIN/1.73SQ M
GFR SERPL CREATININE-BSD FRML MDRD: 39 ML/MIN/1.73SQ M
GIANT PLATELETS BLD QL SMEAR: PRESENT
GLUCOSE SERPL-MCNC: 107 MG/DL (ref 65–140)
GLUCOSE SERPL-MCNC: 114 MG/DL (ref 65–140)
GLUCOSE SERPL-MCNC: 117 MG/DL (ref 65–140)
GLUCOSE SERPL-MCNC: 129 MG/DL (ref 65–140)
GLUCOSE SERPL-MCNC: 132 MG/DL (ref 65–140)
GLUCOSE SERPL-MCNC: 161 MG/DL (ref 65–140)
GLUCOSE SERPL-MCNC: 165 MG/DL (ref 65–140)
GLUCOSE SERPL-MCNC: 171 MG/DL (ref 65–140)
GLUCOSE SERPL-MCNC: 174 MG/DL (ref 65–140)
GLUCOSE SERPL-MCNC: 179 MG/DL (ref 65–140)
GLUCOSE SERPL-MCNC: 180 MG/DL (ref 65–140)
GLUCOSE SERPL-MCNC: 185 MG/DL (ref 65–140)
GLUCOSE SERPL-MCNC: 73 MG/DL (ref 65–140)
GLUCOSE SERPL-MCNC: 94 MG/DL (ref 65–140)
GLUCOSE UR STRIP-MCNC: NEGATIVE MG/DL
HCO3 BLDV-SCNC: 17.2 MMOL/L (ref 24–30)
HCO3 BLDV-SCNC: 17.8 MMOL/L (ref 24–30)
HCT VFR BLD AUTO: 23 % (ref 34.8–46.1)
HCT VFR BLD AUTO: 23.4 % (ref 34.8–46.1)
HCT VFR BLD AUTO: 23.5 % (ref 34.8–46.1)
HCT VFR BLD AUTO: 24.7 % (ref 34.8–46.1)
HGB BLD-MCNC: 7.4 G/DL (ref 11.5–15.4)
HGB BLD-MCNC: 7.8 G/DL (ref 11.5–15.4)
HGB BLD-MCNC: 7.9 G/DL (ref 11.5–15.4)
HGB BLD-MCNC: 8 G/DL (ref 11.5–15.4)
HGB UR QL STRIP.AUTO: ABNORMAL
INR PPP: 1.92 (ref 0.84–1.19)
INR PPP: 2 (ref 0.84–1.19)
KETONES UR STRIP-MCNC: NEGATIVE MG/DL
LACTATE SERPL-SCNC: 0.7 MMOL/L (ref 0.5–2)
LACTATE SERPL-SCNC: 1.1 MMOL/L (ref 0.5–2)
LACTATE SERPL-SCNC: 1.7 MMOL/L (ref 0.5–2)
LEUKOCYTE ESTERASE UR QL STRIP: ABNORMAL
LYMPHOCYTES # BLD AUTO: 0 % (ref 14–44)
LYMPHOCYTES # BLD AUTO: 0 % (ref 14–44)
LYMPHOCYTES # BLD AUTO: 0 THOUSAND/UL (ref 0.6–4.47)
LYMPHOCYTES # BLD AUTO: 0 THOUSAND/UL (ref 0.6–4.47)
LYMPHOCYTES # BLD AUTO: 0.11 THOUSAND/UL (ref 0.6–4.47)
LYMPHOCYTES # BLD AUTO: 1 % (ref 14–44)
MAGNESIUM SERPL-MCNC: 2.2 MG/DL (ref 1.9–2.7)
MAGNESIUM SERPL-MCNC: 2.4 MG/DL (ref 1.9–2.7)
MAGNESIUM SERPL-MCNC: 2.5 MG/DL (ref 1.9–2.7)
MCH RBC QN AUTO: 29.6 PG (ref 26.8–34.3)
MCH RBC QN AUTO: 29.9 PG (ref 26.8–34.3)
MCH RBC QN AUTO: 30.1 PG (ref 26.8–34.3)
MCH RBC QN AUTO: 30.6 PG (ref 26.8–34.3)
MCHC RBC AUTO-ENTMCNC: 32.2 G/DL (ref 31.4–37.4)
MCHC RBC AUTO-ENTMCNC: 32.4 G/DL (ref 31.4–37.4)
MCHC RBC AUTO-ENTMCNC: 33.2 G/DL (ref 31.4–37.4)
MCHC RBC AUTO-ENTMCNC: 33.8 G/DL (ref 31.4–37.4)
MCV RBC AUTO: 91 FL (ref 82–98)
MCV RBC AUTO: 91 FL (ref 82–98)
MCV RBC AUTO: 92 FL (ref 82–98)
MCV RBC AUTO: 92 FL (ref 82–98)
METAMYELOCYTES NFR BLD MANUAL: 1 % (ref 0–1)
METAMYELOCYTES NFR BLD MANUAL: 5 % (ref 0–1)
METAMYELOCYTES NFR BLD MANUAL: 6 % (ref 0–1)
MONOCYTES # BLD AUTO: 0.12 THOUSAND/UL (ref 0–1.22)
MONOCYTES # BLD AUTO: 0.23 THOUSAND/UL (ref 0–1.22)
MONOCYTES # BLD AUTO: 1.65 THOUSAND/UL (ref 0–1.22)
MONOCYTES NFR BLD: 1 % (ref 4–12)
MONOCYTES NFR BLD: 13 % (ref 4–12)
MONOCYTES NFR BLD: 2 % (ref 4–12)
MYELOCYTES NFR BLD MANUAL: 2 % (ref 0–1)
MYELOCYTES NFR BLD MANUAL: 4 % (ref 0–1)
MYELOCYTES NFR BLD MANUAL: 6 % (ref 0–1)
NEUTROPHILS # BLD MANUAL: 10.17 THOUSAND/UL (ref 1.85–7.62)
NEUTROPHILS # BLD MANUAL: 10.44 THOUSAND/UL (ref 1.85–7.62)
NEUTROPHILS # BLD MANUAL: 9.94 THOUSAND/UL (ref 1.85–7.62)
NEUTS BAND NFR BLD MANUAL: 10 % (ref 0–8)
NEUTS BAND NFR BLD MANUAL: 13 % (ref 0–8)
NEUTS BAND NFR BLD MANUAL: 14 % (ref 0–8)
NEUTS SEG NFR BLD AUTO: 69 % (ref 43–75)
NEUTS SEG NFR BLD AUTO: 72 % (ref 43–75)
NEUTS SEG NFR BLD AUTO: 79 % (ref 43–75)
NITRITE UR QL STRIP: NEGATIVE
NON-SQ EPI CELLS URNS QL MICRO: ABNORMAL /HPF
NRBC BLD AUTO-RTO: 3 /100 WBC (ref 0–2)
NRBC BLD AUTO-RTO: 6 /100 WBC (ref 0–2)
NRBC BLD AUTO-RTO: 7 /100 WBC (ref 0–2)
O2 CT BLDV-SCNC: 10.2 ML/DL
O2 CT BLDV-SCNC: 10.8 ML/DL
OVALOCYTES BLD QL SMEAR: PRESENT
OVALOCYTES BLD QL SMEAR: PRESENT
PCO2 BLDV: 37.1 MM HG (ref 42–50)
PCO2 BLDV: 38.5 MM HG (ref 42–50)
PH BLDV: 7.28 [PH] (ref 7.3–7.4)
PH BLDV: 7.29 [PH] (ref 7.3–7.4)
PH UR STRIP.AUTO: 6 [PH]
PHOSPHATE SERPL-MCNC: 5.7 MG/DL (ref 2.7–4.5)
PHOSPHATE SERPL-MCNC: 5.8 MG/DL (ref 2.7–4.5)
PHOSPHATE SERPL-MCNC: 6.2 MG/DL (ref 2.7–4.5)
PLATELET # BLD AUTO: 57 THOUSANDS/UL (ref 149–390)
PLATELET # BLD AUTO: 58 THOUSANDS/UL (ref 149–390)
PLATELET # BLD AUTO: 68 THOUSANDS/UL (ref 149–390)
PLATELET # BLD AUTO: 70 THOUSANDS/UL (ref 149–390)
PLATELET BLD QL SMEAR: ABNORMAL
PMV BLD AUTO: 12.5 FL (ref 8.9–12.7)
PMV BLD AUTO: 12.6 FL (ref 8.9–12.7)
PMV BLD AUTO: 13 FL (ref 8.9–12.7)
PMV BLD AUTO: 13.8 FL (ref 8.9–12.7)
PO2 BLDV: 64.3 MM HG (ref 35–45)
PO2 BLDV: 87.6 MM HG (ref 35–45)
POIKILOCYTOSIS BLD QL SMEAR: PRESENT
POTASSIUM SERPL-SCNC: 3.4 MMOL/L (ref 3.5–5.3)
POTASSIUM SERPL-SCNC: 3.8 MMOL/L (ref 3.5–5.3)
POTASSIUM SERPL-SCNC: 3.9 MMOL/L (ref 3.5–5.3)
POTASSIUM SERPL-SCNC: 4.4 MMOL/L (ref 3.5–5.3)
PROMYELOCYTES NFR BLD MANUAL: 1 % (ref 0–0)
PROT SERPL-MCNC: 4.7 G/DL (ref 6.4–8.4)
PROT UR STRIP-MCNC: ABNORMAL MG/DL
PROTHROMBIN TIME: 21.6 SECONDS (ref 11.6–14.5)
PROTHROMBIN TIME: 22.3 SECONDS (ref 11.6–14.5)
RBC # BLD AUTO: 2.5 MILLION/UL (ref 3.81–5.12)
RBC # BLD AUTO: 2.58 MILLION/UL (ref 3.81–5.12)
RBC # BLD AUTO: 2.59 MILLION/UL (ref 3.81–5.12)
RBC # BLD AUTO: 2.68 MILLION/UL (ref 3.81–5.12)
RBC #/AREA URNS AUTO: ABNORMAL /HPF
RBC MORPH BLD: PRESENT
RH BLD: NEGATIVE
SIMV VENT INSPIRED AIR FIO2: 40
SIMV VENT INSPIRED AIR FIO2: 40
SIMV VENT PEEP: 6
SIMV VENT PEEP: 6
SIMV VENT TIDAL VOLUME: 450
SIMV VENT TIDAL VOLUME: 450
SIMV VENT: ABNORMAL
SIMV VENT: ABNORMAL
SODIUM SERPL-SCNC: 146 MMOL/L (ref 135–147)
SODIUM SERPL-SCNC: 148 MMOL/L (ref 135–147)
SP GR UR STRIP.AUTO: 1.02 (ref 1–1.03)
SPECIMEN EXPIRATION DATE: NORMAL
THROMBIN TIME MIXING INCUBATED: 18 SECONDS (ref 14.7–18.4)
THROMBIN TIME MIXING ROOM TEMP: 17.8 SECONDS (ref 14.7–18.4)
THROMBIN TIME: 18.8 SECONDS (ref 14.7–18.4)
TOXIC GRANULES BLD QL SMEAR: PRESENT
TOXIC GRANULES BLD QL SMEAR: PRESENT
UNIT DISPENSE STATUS: NORMAL
UNIT PRODUCT CODE: NORMAL
UNIT PRODUCT VOLUME: 250 ML
UNIT RH: NORMAL
UROBILINOGEN UR STRIP-ACNC: <2 MG/DL
VENT SIMV: 16
VENT SIMV: 16
WBC # BLD AUTO: 10.66 THOUSAND/UL (ref 4.31–10.16)
WBC # BLD AUTO: 11.43 THOUSAND/UL (ref 4.31–10.16)
WBC # BLD AUTO: 11.56 THOUSAND/UL (ref 4.31–10.16)
WBC # BLD AUTO: 12.73 THOUSAND/UL (ref 4.31–10.16)
WBC #/AREA URNS AUTO: ABNORMAL /HPF
WBC TOXIC VACUOLES BLD QL SMEAR: PRESENT
WBC TOXIC VACUOLES BLD QL SMEAR: PRESENT

## 2024-04-04 PROCEDURE — 36556 INSERT NON-TUNNEL CV CATH: CPT | Performed by: INTERNAL MEDICINE

## 2024-04-04 PROCEDURE — 85027 COMPLETE CBC AUTOMATED: CPT

## 2024-04-04 PROCEDURE — 82948 REAGENT STRIP/BLOOD GLUCOSE: CPT

## 2024-04-04 PROCEDURE — 84100 ASSAY OF PHOSPHORUS: CPT | Performed by: STUDENT IN AN ORGANIZED HEALTH CARE EDUCATION/TRAINING PROGRAM

## 2024-04-04 PROCEDURE — 02HV33Z INSERTION OF INFUSION DEVICE INTO SUPERIOR VENA CAVA, PERCUTANEOUS APPROACH: ICD-10-PCS | Performed by: INTERNAL MEDICINE

## 2024-04-04 PROCEDURE — 85007 BL SMEAR W/DIFF WBC COUNT: CPT

## 2024-04-04 PROCEDURE — C9254 INJECTION, LACOSAMIDE: HCPCS | Performed by: STUDENT IN AN ORGANIZED HEALTH CARE EDUCATION/TRAINING PROGRAM

## 2024-04-04 PROCEDURE — 90945 DIALYSIS ONE EVALUATION: CPT

## 2024-04-04 PROCEDURE — 85670 THROMBIN TIME PLASMA: CPT | Performed by: STUDENT IN AN ORGANIZED HEALTH CARE EDUCATION/TRAINING PROGRAM

## 2024-04-04 PROCEDURE — 86901 BLOOD TYPING SEROLOGIC RH(D): CPT

## 2024-04-04 PROCEDURE — 85027 COMPLETE CBC AUTOMATED: CPT | Performed by: STUDENT IN AN ORGANIZED HEALTH CARE EDUCATION/TRAINING PROGRAM

## 2024-04-04 PROCEDURE — 84100 ASSAY OF PHOSPHORUS: CPT

## 2024-04-04 PROCEDURE — 85384 FIBRINOGEN ACTIVITY: CPT | Performed by: STUDENT IN AN ORGANIZED HEALTH CARE EDUCATION/TRAINING PROGRAM

## 2024-04-04 PROCEDURE — 80048 BASIC METABOLIC PNL TOTAL CA: CPT

## 2024-04-04 PROCEDURE — 85610 PROTHROMBIN TIME: CPT

## 2024-04-04 PROCEDURE — 99292 CRITICAL CARE ADDL 30 MIN: CPT | Performed by: INTERNAL MEDICINE

## 2024-04-04 PROCEDURE — 94664 DEMO&/EVAL PT USE INHALER: CPT

## 2024-04-04 PROCEDURE — 94669 MECHANICAL CHEST WALL OSCILL: CPT

## 2024-04-04 PROCEDURE — 83735 ASSAY OF MAGNESIUM: CPT | Performed by: STUDENT IN AN ORGANIZED HEALTH CARE EDUCATION/TRAINING PROGRAM

## 2024-04-04 PROCEDURE — 99233 SBSQ HOSP IP/OBS HIGH 50: CPT | Performed by: INTERNAL MEDICINE

## 2024-04-04 PROCEDURE — P9017 PLASMA 1 DONOR FRZ W/IN 8 HR: HCPCS

## 2024-04-04 PROCEDURE — 87040 BLOOD CULTURE FOR BACTERIA: CPT | Performed by: STUDENT IN AN ORGANIZED HEALTH CARE EDUCATION/TRAINING PROGRAM

## 2024-04-04 PROCEDURE — 11046 DBRDMT MUSC&/FSCA EA ADDL: CPT | Performed by: SURGERY

## 2024-04-04 PROCEDURE — 83605 ASSAY OF LACTIC ACID: CPT

## 2024-04-04 PROCEDURE — C9113 INJ PANTOPRAZOLE SODIUM, VIA: HCPCS | Performed by: INTERNAL MEDICINE

## 2024-04-04 PROCEDURE — 86900 BLOOD TYPING SEROLOGIC ABO: CPT

## 2024-04-04 PROCEDURE — 99233 SBSQ HOSP IP/OBS HIGH 50: CPT | Performed by: PHYSICIAN ASSISTANT

## 2024-04-04 PROCEDURE — 82330 ASSAY OF CALCIUM: CPT | Performed by: STUDENT IN AN ORGANIZED HEALTH CARE EDUCATION/TRAINING PROGRAM

## 2024-04-04 PROCEDURE — 99024 POSTOP FOLLOW-UP VISIT: CPT | Performed by: SURGERY

## 2024-04-04 PROCEDURE — 80048 BASIC METABOLIC PNL TOTAL CA: CPT | Performed by: STUDENT IN AN ORGANIZED HEALTH CARE EDUCATION/TRAINING PROGRAM

## 2024-04-04 PROCEDURE — 93971 EXTREMITY STUDY: CPT | Performed by: SURGERY

## 2024-04-04 PROCEDURE — 80076 HEPATIC FUNCTION PANEL: CPT

## 2024-04-04 PROCEDURE — NC001 PR NO CHARGE: Performed by: INTERNAL MEDICINE

## 2024-04-04 PROCEDURE — 86850 RBC ANTIBODY SCREEN: CPT

## 2024-04-04 PROCEDURE — 99232 SBSQ HOSP IP/OBS MODERATE 35: CPT | Performed by: NURSE PRACTITIONER

## 2024-04-04 PROCEDURE — 85397 CLOTTING FUNCT ACTIVITY: CPT | Performed by: STUDENT IN AN ORGANIZED HEALTH CARE EDUCATION/TRAINING PROGRAM

## 2024-04-04 PROCEDURE — 87106 FUNGI IDENTIFICATION YEAST: CPT | Performed by: STUDENT IN AN ORGANIZED HEALTH CARE EDUCATION/TRAINING PROGRAM

## 2024-04-04 PROCEDURE — 99233 SBSQ HOSP IP/OBS HIGH 50: CPT | Performed by: STUDENT IN AN ORGANIZED HEALTH CARE EDUCATION/TRAINING PROGRAM

## 2024-04-04 PROCEDURE — 11043 DBRDMT MUSC&/FSCA 1ST 20/<: CPT | Performed by: SURGERY

## 2024-04-04 PROCEDURE — 85576 BLOOD PLATELET AGGREGATION: CPT | Performed by: STUDENT IN AN ORGANIZED HEALTH CARE EDUCATION/TRAINING PROGRAM

## 2024-04-04 PROCEDURE — 82805 BLOOD GASES W/O2 SATURATION: CPT

## 2024-04-04 PROCEDURE — 0JB80ZZ EXCISION OF ABDOMEN SUBCUTANEOUS TISSUE AND FASCIA, OPEN APPROACH: ICD-10-PCS | Performed by: SURGERY

## 2024-04-04 PROCEDURE — 85730 THROMBOPLASTIN TIME PARTIAL: CPT

## 2024-04-04 PROCEDURE — 99291 CRITICAL CARE FIRST HOUR: CPT | Performed by: INTERNAL MEDICINE

## 2024-04-04 PROCEDURE — 94760 N-INVAS EAR/PLS OXIMETRY 1: CPT

## 2024-04-04 PROCEDURE — 82805 BLOOD GASES W/O2 SATURATION: CPT | Performed by: STUDENT IN AN ORGANIZED HEALTH CARE EDUCATION/TRAINING PROGRAM

## 2024-04-04 PROCEDURE — 81001 URINALYSIS AUTO W/SCOPE: CPT | Performed by: STUDENT IN AN ORGANIZED HEALTH CARE EDUCATION/TRAINING PROGRAM

## 2024-04-04 PROCEDURE — 85007 BL SMEAR W/DIFF WBC COUNT: CPT | Performed by: STUDENT IN AN ORGANIZED HEALTH CARE EDUCATION/TRAINING PROGRAM

## 2024-04-04 PROCEDURE — 94640 AIRWAY INHALATION TREATMENT: CPT

## 2024-04-04 PROCEDURE — 94003 VENT MGMT INPAT SUBQ DAY: CPT

## 2024-04-04 PROCEDURE — 85347 COAGULATION TIME ACTIVATED: CPT | Performed by: STUDENT IN AN ORGANIZED HEALTH CARE EDUCATION/TRAINING PROGRAM

## 2024-04-04 RX ORDER — SODIUM CHLORIDE, SODIUM GLUCONATE, SODIUM ACETATE, POTASSIUM CHLORIDE, MAGNESIUM CHLORIDE, SODIUM PHOSPHATE, DIBASIC, AND POTASSIUM PHOSPHATE .53; .5; .37; .037; .03; .012; .00082 G/100ML; G/100ML; G/100ML; G/100ML; G/100ML; G/100ML; G/100ML
100 INJECTION, SOLUTION INTRAVENOUS CONTINUOUS
Status: DISCONTINUED | OUTPATIENT
Start: 2024-04-04 | End: 2024-04-04

## 2024-04-04 RX ORDER — SULFAMETHOXAZOLE AND TRIMETHOPRIM 800; 160 MG/1; MG/1
1 TABLET ORAL 3 TIMES WEEKLY
Status: DISCONTINUED | OUTPATIENT
Start: 2024-04-05 | End: 2024-04-12

## 2024-04-04 RX ORDER — SULFAMETHOXAZOLE AND TRIMETHOPRIM 800; 160 MG/1; MG/1
1 TABLET ORAL 3 TIMES WEEKLY
Status: DISCONTINUED | OUTPATIENT
Start: 2024-04-05 | End: 2024-04-04

## 2024-04-04 RX ORDER — ONDANSETRON 2 MG/ML
INJECTION INTRAMUSCULAR; INTRAVENOUS AS NEEDED
Status: DISCONTINUED | OUTPATIENT
Start: 2024-04-04 | End: 2024-04-04

## 2024-04-04 RX ORDER — ROCURONIUM BROMIDE 10 MG/ML
INJECTION, SOLUTION INTRAVENOUS AS NEEDED
Status: DISCONTINUED | OUTPATIENT
Start: 2024-04-04 | End: 2024-04-04

## 2024-04-04 RX ORDER — FENTANYL CITRATE 50 UG/ML
INJECTION, SOLUTION INTRAMUSCULAR; INTRAVENOUS AS NEEDED
Status: DISCONTINUED | OUTPATIENT
Start: 2024-04-04 | End: 2024-04-04

## 2024-04-04 RX ORDER — CALCIUM GLUCONATE 20 MG/ML
INJECTION, SOLUTION INTRAVENOUS
Status: DISPENSED
Start: 2024-04-04 | End: 2024-04-04

## 2024-04-04 RX ORDER — POTASSIUM CHLORIDE 29.8 MG/ML
40 INJECTION INTRAVENOUS ONCE
Status: COMPLETED | OUTPATIENT
Start: 2024-04-04 | End: 2024-04-04

## 2024-04-04 RX ORDER — POTASSIUM CHLORIDE 14.9 MG/ML
20 INJECTION INTRAVENOUS ONCE
Status: COMPLETED | OUTPATIENT
Start: 2024-04-04 | End: 2024-04-04

## 2024-04-04 RX ORDER — DEXTROSE MONOHYDRATE 50 MG/ML
75 INJECTION, SOLUTION INTRAVENOUS CONTINUOUS
Status: DISPENSED | OUTPATIENT
Start: 2024-04-04 | End: 2024-04-04

## 2024-04-04 RX ORDER — SODIUM CHLORIDE, SODIUM LACTATE, POTASSIUM CHLORIDE, CALCIUM CHLORIDE 600; 310; 30; 20 MG/100ML; MG/100ML; MG/100ML; MG/100ML
INJECTION, SOLUTION INTRAVENOUS CONTINUOUS PRN
Status: DISCONTINUED | OUTPATIENT
Start: 2024-04-04 | End: 2024-04-04

## 2024-04-04 RX ORDER — DEXTROSE MONOHYDRATE 50 MG/ML
75 INJECTION, SOLUTION INTRAVENOUS CONTINUOUS
Status: DISCONTINUED | OUTPATIENT
Start: 2024-04-04 | End: 2024-04-04

## 2024-04-04 RX ORDER — MINOCYCLINE HYDROCHLORIDE 50 MG/1
200 TABLET ORAL EVERY 12 HOURS
Status: DISPENSED | OUTPATIENT
Start: 2024-04-04 | End: 2024-04-11

## 2024-04-04 RX ORDER — DEXTROSE MONOHYDRATE 50 MG/ML
50 INJECTION, SOLUTION INTRAVENOUS CONTINUOUS
Status: DISCONTINUED | OUTPATIENT
Start: 2024-04-04 | End: 2024-04-04

## 2024-04-04 RX ORDER — FLUCONAZOLE 200 MG/100ML
800 INJECTION, SOLUTION INTRAVENOUS EVERY 24 HOURS
Status: DISCONTINUED | OUTPATIENT
Start: 2024-04-05 | End: 2024-04-18

## 2024-04-04 RX ORDER — POTASSIUM CHLORIDE 29.8 MG/ML
40 INJECTION INTRAVENOUS
Qty: 200 ML | Refills: 0 | Status: DISPENSED | OUTPATIENT
Start: 2024-04-04 | End: 2024-04-04

## 2024-04-04 RX ADMIN — Medication 20000 ML: at 16:50

## 2024-04-04 RX ADMIN — ROCURONIUM BROMIDE 30 MG: 10 INJECTION, SOLUTION INTRAVENOUS at 20:29

## 2024-04-04 RX ADMIN — PIPERACILLIN SODIUM AND TAZOBACTAM SODIUM 4.5 G: 36; 4.5 INJECTION, POWDER, LYOPHILIZED, FOR SOLUTION INTRAVENOUS at 08:10

## 2024-04-04 RX ADMIN — Medication 2 SPRAY: at 08:11

## 2024-04-04 RX ADMIN — POTASSIUM CHLORIDE 40 MEQ: 29.8 INJECTION, SOLUTION INTRAVENOUS at 20:02

## 2024-04-04 RX ADMIN — SUGAMMADEX 200 MG: 100 INJECTION, SOLUTION INTRAVENOUS at 21:17

## 2024-04-04 RX ADMIN — ONDANSETRON 4 MG: 2 INJECTION INTRAMUSCULAR; INTRAVENOUS at 20:48

## 2024-04-04 RX ADMIN — PHYTONADIONE 10 MG: 10 INJECTION, EMULSION INTRAMUSCULAR; INTRAVENOUS; SUBCUTANEOUS at 10:50

## 2024-04-04 RX ADMIN — FLUCONAZOLE IN SODIUM CHLORIDE 400 MG: 2 INJECTION, SOLUTION INTRAVENOUS at 09:57

## 2024-04-04 RX ADMIN — SODIUM CHLORIDE SOLN NEBU 3% 4 ML: 3 NEBU SOLN at 07:39

## 2024-04-04 RX ADMIN — FENTANYL CITRATE 25 MCG: 50 INJECTION INTRAMUSCULAR; INTRAVENOUS at 20:44

## 2024-04-04 RX ADMIN — MINOCYCLINE HYDROCHLORIDE 200 MG: 50 TABLET, FILM COATED ORAL at 08:11

## 2024-04-04 RX ADMIN — SODIUM CHLORIDE, SODIUM GLUCONATE, SODIUM ACETATE, POTASSIUM CHLORIDE, MAGNESIUM CHLORIDE, SODIUM PHOSPHATE, DIBASIC, AND POTASSIUM PHOSPHATE 100 ML/HR: .53; .5; .37; .037; .03; .012; .00082 INJECTION, SOLUTION INTRAVENOUS at 01:30

## 2024-04-04 RX ADMIN — LACOSAMIDE 100 MG: 10 INJECTION, SOLUTION INTRAVENOUS at 08:30

## 2024-04-04 RX ADMIN — POTASSIUM CHLORIDE 20 MEQ: 14.9 INJECTION, SOLUTION INTRAVENOUS at 22:36

## 2024-04-04 RX ADMIN — SODIUM CHLORIDE SOLN NEBU 3% 4 ML: 3 NEBU SOLN at 14:09

## 2024-04-04 RX ADMIN — PIPERACILLIN SODIUM AND TAZOBACTAM SODIUM 4.5 G: 36; 4.5 INJECTION, POWDER, LYOPHILIZED, FOR SOLUTION INTRAVENOUS at 17:51

## 2024-04-04 RX ADMIN — CHLORHEXIDINE GLUCONATE 0.12% ORAL RINSE 15 ML: 1.2 LIQUID ORAL at 08:10

## 2024-04-04 RX ADMIN — NYSTATIN: 100000 POWDER TOPICAL at 17:51

## 2024-04-04 RX ADMIN — PANTOPRAZOLE SODIUM 40 MG: 40 INJECTION, POWDER, FOR SOLUTION INTRAVENOUS at 08:09

## 2024-04-04 RX ADMIN — LEVALBUTEROL HYDROCHLORIDE 1.25 MG: 1.25 SOLUTION RESPIRATORY (INHALATION) at 14:09

## 2024-04-04 RX ADMIN — METHYLPREDNISOLONE SODIUM SUCCINATE 30 MG: 40 INJECTION, POWDER, FOR SOLUTION INTRAMUSCULAR; INTRAVENOUS at 08:10

## 2024-04-04 RX ADMIN — SODIUM CHLORIDE, SODIUM LACTATE, POTASSIUM CHLORIDE, AND CALCIUM CHLORIDE: .6; .31; .03; .02 INJECTION, SOLUTION INTRAVENOUS at 20:24

## 2024-04-04 RX ADMIN — FENTANYL CITRATE 25 MCG: 50 INJECTION INTRAMUSCULAR; INTRAVENOUS at 09:57

## 2024-04-04 RX ADMIN — PIPERACILLIN SODIUM AND TAZOBACTAM SODIUM 4.5 G: 36; 4.5 INJECTION, POWDER, LYOPHILIZED, FOR SOLUTION INTRAVENOUS at 00:14

## 2024-04-04 RX ADMIN — DEXTROSE 75 ML/HR: 5 SOLUTION INTRAVENOUS at 08:00

## 2024-04-04 RX ADMIN — LEVALBUTEROL HYDROCHLORIDE 1.25 MG: 1.25 SOLUTION RESPIRATORY (INHALATION) at 07:39

## 2024-04-04 RX ADMIN — NYSTATIN: 100000 POWDER TOPICAL at 08:12

## 2024-04-04 RX ADMIN — FENTANYL CITRATE 25 MCG: 50 INJECTION INTRAMUSCULAR; INTRAVENOUS at 21:15

## 2024-04-04 NOTE — ASSESSMENT & PLAN NOTE
Code Status: full - Level 1  Ongoing medical optimization without limits at this time. Hopeful for slow recovery. Min does express understanding of periodic setbacks but is hoping Carla can build some momentum to become medically stable for d/c to rehab. He does not wish for her to be in a long term care facility and is open to revisiting goals of care if she is not making improvements toward discharge.

## 2024-04-04 NOTE — ASSESSMENT & PLAN NOTE
Time spent providing supportive listening to spouse Min at bedside. Extended offer of support to patient's children.  Min continues to be consistently present and invested in patient's care.   Also supported by her daughter, son, and robust  group of friends, brother, and natalia community  Decisional apparatus:  Patient is not competent on my exam today.  If competence is lost, patient's substitute decision maker would default to spouse Min by [place] Act 169.  Advance Directive / Living Will / POLST:  None on file, unable to complete  Palliative care MSW continues to follow

## 2024-04-04 NOTE — PROGRESS NOTES
Spiritual Care Progress Note    2024  Patient: Carla Hunt : 1965  Admission Date & Time: 1/10/2024 1941  MRN: [id number] [facility]: [id number]        Fr Daugherty followed-up with the pt's  and conducted a pastoral visit. No further needs were expressed at this time.    Chaplains still remain available.       24 0900   Clinical Encounter Type   Visited With Family   Routine Visit Follow-up

## 2024-04-04 NOTE — PROGRESS NOTES
Progress Note - Infectious Disease   Carla Hunt 59 y.o. female MRN: [id number]  Unit/Bed#: MICU 10 Encounter: [id number]      Impression/Plan:    1. Acute hypoxic respiratory failure, likely multifactorial, with both COVID and bacterial pneumonia contributing.  Also consider persistent inflammation, fibrosis as seems quite steroid responsive in past.  Most recently extubated 3/1.  Patient with worsening respiratory status requiring intubation again 3/11.  Suspect ongoing hypoxia related to persistent COVID-pneumonia, superimposed bacterial infection, inflammatory component/lung fibrosis related to COVID, now with profound deconditioning and muscle weakness.  Status post bronchoscopy and trach 3/12 with excessive secretions seen from the lower lobes bilaterally.  BAL culture from 3/11 shows heavy growth of Stenotrophomonas maltophilia, Candida albicans.  PJP DFA negative.  Fungitell was positive but patient was on beta-lactam antibiotics and had received multiple blood transfusions.  She was clinically improving and monitor off antifungals. Serum cryptococcal Ag negative. Status post 10 days of vancomycin and cefepime, 14-day course of remdesivir/Paxlovid 3/15, 14-day course of antibiotics with minocycline for stenotrophomonas pneumonia. Status post repeat bronchoscopy 3/17 for airway clearance with continued thick secretions.   Histoplasma urine antigen negative.  Patient was clinically improving and weaned to trach collar but 3/30 had worsening lethargy, again placed on the ventilator 3/31.  Repeat CT chest showed new consolidation in the right upper lobe, CT head unchanged.  Antibiotics restarted.  Sputum culture is now growing stenotrophomonas maltophilia and mixed respiratory lauren.  Course also complicated by candidemia, now ileostomy retraction and fecal contamination of the midline wound   -continue Zosyn extended infusion dosing in setting of fecal contamination of wound, possible peritoneal  contamination   -Continue minocycline 200 mg twice daily.  Tube feeds should be held for 1 hour prior to and 1 hour after minocycline administration. E test requested, minocycline is sensitive  -Steroid wean per critical care, last dose tomorrow  -Antifungals as below  -follow up aspergillus antigen  -If no improvement in mental status with antimicrobials, surgery, CRRT, recommend MRI brain  -If patient again loses enteric access we will switch to IV Eravacycline and Flagyl    2. Candidemia.  2 sets of blood cultures collected 3/31 are growing Candida albicans.  The patient has numerous risk factors for candidemia including presence of midline, abdominal surgery, prolonged hospitalization in the ICU, multiple courses of broad-spectrum antibiotics, leukopenia/recent neutropenia.  Suspect ileostomy retraction with wound contamination is the source.  TTE no vegetations.  Status post ophthalmology evaluation, no evidence of endophthalmitis   -Hold further doses of IV micafungin after today   -Continue fluconazole, will increase dose to 800 mg daily due to CRRT   -Follow-up repeat blood cultures   -Consideration for SATURNINO next week pending clinical course    3.  Sepsis, recurrent since admission.  Due to COVID-19 infection, recurrent pneumonias, cecal volvulus status post surgery and wound dehiscence, now with candidemia, fecal contamination of midline wound   -Antibiotics and antifungals as above  -Follow temperatures closely  -Recheck WBC in AM to monitor infection  -Supportive care as per the primary service  -Abdominal washout with surgery today     3. Recurrent bacterial pneumonia.  The patient has completed multiple treatment courses over the hospitalization. Prior BAL cultures with Pseudomonas and stenotrophomonas.  Unclear if pathogens or colonizers.  Status post 10 days levofloxacin.  CT C/A/P showed evolving changes likely all due to sequelae of COVID, infections.  Noted to have worsening hypoxia and purulent  secretions on bronchoscopy 3/11.  BAL culture with heavy growth of Stenotrophomonas maltophilia, Candida.  Completed 14-day course of minocycline 3/27.  CT chest now shows new right upper lobe infiltrate and sputum culture is again growing Stenotrophomonas maltophilia.  Given worsening clinical decline and new infiltrate we will continue stenotrophomonas treatment for now              -Antibiotics as above              -Wean O2 as able     4. Severe COVID, present on admission.  Patient was treated with a 10-day course of remdesivir, dexamethasone and was given 1 dose of Tocilizumab on admission.  COVID antigen was negative prior to coming off isolation.  Had positive COVID PCR from BAL 2/16, status post another 5-day course of remdesivir.  Patient has remained on high-dose systemic corticosteroid throughout her hospitalization.  COVID antigen positive and PCR is also positive 3/3 and Ct 26.8, consistent with high viral replication.  Repeat PCR positive with Ct closer to 30. Status post 14-day course of remdesivir/Paxlovid 3/15/2024.  Rapid COVID antigen negative 3/25 and 3/27  -Steroid wean per ICU  -No additional antivirals indicated  - Bactrim for PJP prophylaxis     5. 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  Patient is status post exploratory laparotomy with ileocecectomy and end ileostomy creation 2/20.   End ileostomy is with ongoing ischemic changes which is likely contributing to the imaging findings and ongoing SIRS response.  Now with wound dehiscence status post staple removal, status post wound VAC placement 3/13, removed but replaced due to bleeding.  Now with retraction of ileostomy and fecal contamination through midline wound  -Serial exams  -Surgery follow-up   -Started on Zosyn due to possible peritoneal contamination and severe immunosuppression   -Surgery planning for washout today     B-cell lymphoma, on maintenance rituxan, which is currently postponed.  Rituximab is a risk  factor for persistent COVID infection.    7.  ELIESER.  Likely multifactorial due to prerenal status, medications.  With worsening BUN and creatinine.  Concern GI bleed make a contributing to high BUN.  Would also consider if this is attributing to worsening mental status. Patient being started on CRRT   -Monitor creatinine closely   -Dose adjust antibiotics for CRRT   -Nephrology following    8.  Anemia, thrombocytopenia, lymphopenia/neutropenia.  Patient has had persistent lymphopenia throughout this admission, likely due to rituximab, viral infection, high-dose steroids.  Now with worsening anemia, neutropenia, and thrombocytopenia.  Bactrim discontinued 3/18. CMV PCR negative.  Not a likely side effect of minocycline.  Immunoglobulins are low.  Started on Granix 3/27, white blood cell count now improved.  May be seen in IRIS type response with multiple underlying infections   -Steroid wean per primary   -Transfusion support per primary   -Hematology follow-up   -Monitor CBC    9.  GI bleed, ulcerations.  Status post EGD yesterday which showed erosive gastritis, esophageal tear with oozing, blood and extensive clot in the esophagus and stomach, small ulcers and trauma throughout the stomach.  Status post repeat EGD today   -Follow-up pathology, viral studies   -Hold on initiation of empiric antivirals    Above management plan to continue antibiotics/antifungals with the critical care team. Discussed with the patient's  at bedside.  ID will follow.    Antibiotics:  Fluconazole  Micafungin  Minocycline  Zosyn    Subjective:  The patient remains lethargic, unresponsive.  Tmax 100.9.  Plan for abdominal wound debridement in the OR today with surgery.  HD catheter placed and plan for initiation of CRRT today due to concern for uremia.  Remains on the ventilator, requiring 40% FiO2.      Objective:  Vitals:  Temp:  [98.2 °F (36.8 °C)-100.9 °F (38.3 °C)] 99.9 °F (37.7 °C)  HR:  [126-150] 126  Resp:  [20-34] 24  BP:  (108-150)/(55-71) 134/71  SpO2:  [95 %-97 %] 96 %  Temp (24hrs), Av °F (37.8 °C), Min:98.2 °F (36.8 °C), Max:100.9 °F (38.3 °C)  Current: Temperature: 99.9 °F (37.7 °C)    Physical Exam:   General Appearance:  Ill-appearing, lethargic   Neck: Trach in place.   Lungs:   Minimal rhonchi bilaterally   Heart:  RRR; no murmur, rub or gallop   Abdomen:   Midline ileostomy, wound contaminated by stool   Extremities: No edema   Skin: No new rashes or lesions.        Labs:   All pertinent labs and imaging studies were personally reviewed  Results from last 7 days   Lab Units 24  0752 24  0118 24  0015   WBC Thousand/uL 12.73* 11.43* 10.66*   HEMOGLOBIN g/dL 7.9* 8.0* 7.8*   PLATELETS Thousands/uL 68* 70* 58*     Results from last 7 days   Lab Units 24  0752 24  0425 24  0118 24  0430 24  0545 24  0454   SODIUM mmol/L  --  148* 146 145  145   < > 141   POTASSIUM mmol/L  --  3.8 3.9 3.8  3.8   < > 3.5   CHLORIDE mmol/L  --  113* 112* 109*  109*   < > 112*   CO2 mmol/L  --  19* 18* 19*  19*   < > 18*   BUN mg/dL  --  120* 116* 103*  103*   < > 61*   CREATININE mg/dL  --  1.63* 1.65* 1.48*  1.48*   < > 0.70   EGFR ml/min/1.73sq m  --  34 33 38  38   < > 95   CALCIUM mg/dL  --  10.1 10.3* 10.0  10.0   < > 10.0   AST U/L 21  --   --  16  --  12*   ALT U/L 34  --   --  29  --  45   ALK PHOS U/L 381*  --   --  210*  --  196*    < > = values in this interval not displayed.     Results from last 7 days   Lab Units 24  1431 24  0545   PROCALCITONIN ng/ml 7.19* 6.28*       Results from last 7 days   Lab Units 24  0545   CRP mg/L 331.2*       Imaging:  CT chest with new right upper lobe consolidation

## 2024-04-04 NOTE — OCCUPATIONAL THERAPY NOTE
Occupational Therapy Cancel Note        Patient Name: Carla Hunt  Today's Date: 4/4/2024 04/04/24 0819   OT Last Visit   OT Visit Date 04/04/24   Note Type   Note Type Cancelled Session   Cancel Reasons Patient to operating room       Per chart review, pt pending OR today for abdominal wall debridement. Will continue to follow on caseload and see when able.    Kaila Michel MS, OTR/L

## 2024-04-04 NOTE — PROGRESS NOTES
[facility]  Progress Note: Critical Care  Name: Carla Hunt 59 y.o. female I MRN: [id number]  Unit/Bed#: MICU 10 I Date of Admission: 1/10/2024   Date of Service: 4/4/2024 I Hospital Day: 85    Assessment/Plan   Acute hypoxic respiratory failure; s/p tracheostomy 3/12  Candidemia; Bcx x2 (3/31) growing candida albicans   Recurrent Stenotrophomonas PNA, previously on Bactrim, switched to minocycline 2/2 ELIESER now resolved, completed 14d course 3/27. 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; complicated by poor wound healing  Wound dehiscence 2/2 poor wound healing s/p wound vac 3/13; worsening with mucus fistula   Hyperphosphatemia in setting of #9  ELIESER  Esophagitis   Bilateral ground glass opacities, persistent, improving; persistent COIVD infection now resolved   Pancytopenia- multifactorial including hx b-cell lymphoma, persistent inflammation, s/p granix  Acute on chronic anemia- multifactorial-intermittent blood loss from ostomy site, chronic inflammation, iatrogenic, marrow dysfunction in setting of persistent inflammation   Lymphopenia with bandemia, in setting of high dose corticosteroids, improving s/p granix  Oropharyngeal dysphagia, likely 2/2 multiple prolonged intubations  Cutaneous paratracheal ulceration  B-Cell lymphoma, s/p chemotheraphy 2022, Rituxan maintenance therapy on hold  Seizure disorder; on vimpat  Steroid induced hyperglycemia;on insulin gtt  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  Hx of complex migraines s/p L temporal lobectomy 2007 complicated by below  Hx of complex seizures in setting of #17, on AEDs  COVID-19 infection POA; resolved; s/p multiple courses of antivirals 2/2 persistent infection,  most recent Remdesivir course completed  3/15, superimposed by recurrent PNA s/p multiple courses Abx  Minimal SAH POA, no interventions required, resolved on repeat CTH     Neuro:  Diagnosis: Alertness  -Hold modafinil 200mg qd delerium precautions  Diagnosis: Analgesia  - PRN Fentanyl 25mcg/hr; on holiday for frequent neurochecks     Diagnosis: Metabolic encephalopathy; POA, improving  -Waxing and waning neuro exam since admission  -Most recent repeat CTH 3/13 negative for acute intracranial findings.  Has had extensive neurological workup including MRI/CT neuroimaging, LP with unremarkable findings  -Now remains off sedation. Electrolytes, sodium, Hyperglycemia 2/2 high dose steroids requiring insulin gtt. Ammonia normal. May be prolonged 2/2 PNA, possibly worsened by uremic encephalopathy worsened by ELIESER now resolved, uremia improving; candida growth seen on BAL Cx from 3/11, may be respiraotry colonization, however currently without signs of systemic fungal infection but is at high risk 2/2 lymphopenia, depressed immunoglobulins in setting of B cell lymphoma and high dose corticosteroids.   3/31- More obtunded, mentation worsening. STAT CTH w no new changes.   Bcx 2/2 (3/31) growing fungemia, identified as candida albicans  Bedside EGD x2 (4/2,4/3) Ulcerated mucosa in the upper third of the esophagus without evidence of hemorrhage   Cryptococcal ag negative    Plan:  -Aspergillus galactomannan ag pending  -Tb quantiferon gold pending  -Slow steroid wean: currently IV Solumedrol 30mg qd day 2/3, followed by 20mg x3d followed by 10mg x3d then stop  -Nephro consulted for CRRT in light of rising BUN without active source of bleeding, intrinsic pathology?  -S/P 14d Remdesivir course to tx COVID-19, completed 3/15  -S/P 14d Minocycline course to tx stenotrophomonas PNA, completed 3/27   -Continue modafinil as above  -Avoid PRN fentanyl/sedative meds as able.  -Delirium precautions  -Frequent neurochecks  -Defer MRI brain due to clinical improvement, not  likely to      Diagnosis: seizure disorder, known history  -S/p partial left temporal lobe resection in 2007 2/2 complex migraines  -On Lamictal 150 bid and Topamax 50mg bid at home  -Last lamotrigine level from 03/02 at 10.7 (therapeutic)  -Home Lamictal switched to Vimpat 3/27 due to worsening pancytopenia, neuro following  -Continue Vimpat 100mg bid maintenance dose  -Continue home topiramate 50mg bid  -Seizure precautions      Diagnosis: Anxiety, known history  -On Effexor 12.5 mg TID     CV:  -Diagnosis: Sinus tachycardia  -TTE 3/17 with no major structural dysfunction  -Multifactorial 2/2 deconditioning, dehydration/hypvol, acute on chronic anemia, underlying infectious/inflammatory process, pain, Modafinal-induced  TTE 4/1 with EF 70%, no WMA, no changes from prior  Plan:  - See plans under Pulm, Heme/Onc, ID, MSK      Pulm:  Diagnosis: Acute hypoxic respiratory failure;  Diagnosis: Bilateral ground glass opacities, improving  -Vent dependent; s/p trach 3/12 after was reintubated 3/11 due to increased WOB  -Persistently COVID+ since admission s/p multiple courses of antivirals (most recent completed 3/15), complicated by recurrent bacterial PNA treated w 10d levaquin (completed 2/25) for MDR PNA; most recent BAL +recurrent stenotrophomona treated with Bactrim, swithced to minocyline 14d course completed 3/28.  -Etiology Likely multifactorial including possible COVID pneumonitis vs recurrent PNA vs hypersensitivity pneumonitis 2/2 ?rituxumab. Persistent inflammation likely given patient has been steroid responsive throughout admission.   -CRP increasing but likely from intraabdominal inflammation as patient is noted to have clinical improvement with weaning of steroids, ABx.   -Repeat CXR 3/22 with significant improvement of multifocal PNA. 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  Plan:  -Wound vac as per general surgery  -Monitor ostomy pouch output  -Zosyn started 4/3 in setting of fecal contamination of wound, possible peritoneal contamination  -Surgery following, planned washout 4/4    Diagnosis: oropharyngeal dysphagia   -Has had multiple and prolonged intubations now s/p trach   -VBS 3/27 oropharyngeal dysphagia  -Speech therapy to begin neuromuscular electrical stim/NMES/VitalStim pending off ventilation   -Continue tube feeds for now until PEG placement pending abdominal wound healing as per Gen Surg    Diagnosis: Esophagitis   Acute on chronic anemia associated bloody NG output on 4/3  Uremic, worsening suggestive of UGIB given relatively stable Cr  Bedside EGD x2 (4/2,4/3) Ulcerated mucosa in the upper third of the esophagus without evidence of hemorrhage   Plan:  Follow up HSV/CMV/candida from GI sample  Continue PPI IV bid  Monitor NGT output, stool output    :  Diagnosis: uremia  -?catabolic from steroids, may also have ?slow UGIB in setting of prolonged steroids though no overt s/s of GIB and H&H stable since 3/23 and patient is on ppi  -Trending up   -EGD without active source of bleedig   -Feurea suggestive of intrinsic pathology   -Nephro consulted for possible CRRT  -BMP q12h      Diagnosis: CKD III,   -Baseline Cr  0.8-1.2  -Cr now stable and at baseline.   -UO adequate, on Ortiz, draining clear yellow urine  -Prerenal azotemia 2/2 uremia,    Plan:  -Trend daily BMP  -Continue to monitor I/O and UOP  -Avoid nephrotoxins, contrast dye as able  -See plan under uremia     F/E/N:  F: D5W 75cc/hr x4h for hypernatremia; FWD .4L  E: monitor and replete for goal K>4, P>3, Mg>2   Diagnosisi: Hyperphosphatemia: phos elevated 6.2. Likely 2/2 ELIESER/reduced excretion. Vitamin D/PTH wnl. Consider Sevelemir in consultation with nephro when diet resumed and adjust tube feeds  N: tube feeds with vital 1.5; 45 cc/h, Prosource liquid protein to 1 packet BID, Refugio BID to support wound healing       Heme/Onc:  Diagnosis: Pancytopenia, improving with intermittent plt/prbc's transfusion, s/p Filgastrim x3  -In setting of hx of B cell lymphoma, prior chemo, Rituxan therapy, prior abx and present meds including lamictal  -Hemo onc consulted, empirically given Filgastrim 300mg qd x3d (complated 3/30); IR bone marrow bx deferred by heme-onc   -Lamictal switched to Vimpat in consultation with neuro as above due to potential contribution to pancytopenia  -Trend CBC and diff daily, neutropenic precautions for ANC <500    Diagnosis: Acute on chronic anemia  -Baseline Hgb ~7-8. S/P 2U PRBCs 3/17 after repeat Hgb 5.3, total of 6U transfuse  d since admission.  -Patient had significant blood loss from ostomy site 3/20, resulting in hemorraghic shock, improved with blood transfusion; hemostasis achieved after bleeding source identified and sutured. Another large vol danie bloody output from osotomy on 3/21, bleeding source within ostomy site, sutured.    -Also having intermittent vaginal bleeding  -EGD 4/3,4/4 without active bleeding  -?Worsened by impaired marrow response liekly 2/2 persistent inflammation due to low retic index ~1 prior to most recent transfusions; normocytic with normal B12/folate levels; MCV<100. Iatrogenic cause likely contributing 2/2 frequent blood draws; chronic anemia likely persistent inflammatory state/CKD/lymphoma in setting of elevated ferritin of 974.  SPEP/UPEP negative. Hemolysis workup negative.   Plan:  -Routine monitoring ostomy output, if bleeding, apply direct pressure and contact gen surg STAT for hemostasis .  -Continue tube feeds/nutritional support  -Trend CBC, transfuse Transfuse with leukoreduced and irradiated RBCs to maintain Hgb>7  -See plan under pancytopenia     Diagnosis: Thrombocytopenia  -Likely multifactorial including imparied production from marrow dysfunction in setting of persistent inflammation; RI low suggestive of hyperproliferation, hx of B-cell lymphoma, recent infections including persistent COVID, PNA treated, CMV negative. No known history of vWD/congenital disorders. No liver dysfunction; recent hepatic profile unremarkable. HIT workup negative, Hemolytic workup negative. No s/s DIC. No mechanical valves. ?Primary ITP.  Plan:  -Filgrastim 300mg x3 to aid in plt recovery  -Transfuse with leukoreduced and irradiated PLTs if count <20k due to increased risk of bleeding   -Goal >50k  -Hold Lovenox for DVT ppx, resume once repeat H&H>7, Plt>50k.  -See plan under pancytopenia      Diagnosis Lymphopenia with bandemia  -In setting of high dose steroids  -Severely depressed immunoglobulins inclding IgG, IgA, IgM  -At risk for further infections  -Filgrastim 300mg x3 to aid in plt recovery  -Contact and airborne precautions    Diagnosis: B cell lymphoma  -[facility]  -S/P 6 cycles of chemotherapy in 20222  -Maintained on Rituxan for 2 year course PTA, started May 2022, last dose was 12/2024, treatment currently on hold in setting of persistent COVID-19 infection  Anti-Ritux Ab negative    Plan:  -Holding rituximab in setting of persistent COVID-19 infection, now resolved.  ?resume rituxubmab pending clinical stability & anti-ritux ab status in consultation with heme-onc     DVT ppx: hold lovenox 2/2 thrombocytopenia, anemia, pending TEG     Endo:  Diagnosis: steroid hyperglycemia and diabetes mellitus type 2, A1c at 6.6  from 01/2024  -Goal -180  -Insulin gtt     ID:  Diagnosis: fungemia   Bcx x2 (3/31) growing yeast, ID panel- candida albicans  In setting of severe lymphopenia and severely depressed immunoglobulins   TTE already done 4/1, no new changes  Ophtho consulted, no evidence of ophthalmic endophthalmitis   Plan:   Continue micofungin, flucanzole  Avoid broad spectrum abx as it can contribute to fungal growth, however requiring Zosyn in setting of fecal contamination of wound, possible peritoneal contamination  Central/midlines changed  ID following    Diagnosis: PCP ppx  On IV bactrim DS 2/2 prolonged high dose steroid therapy     Diagnosis: Recurrent bacterial pneumonia  - Patient has completed multiple treatment courses of remdesivir throughout hospitalization in addition to 7 days of ceftriaxone.  - BAL cultures in 2/2024 positive for MDR Pseudomonas and stenotrophomonas treated with 10d course of Levaquin  - CT C/A/P 3/10 with persistent groundglass opacities and changes suggestive of sequelae of COVID, prior infections.  Completed 10d course of cefepime for broad spectrum coveragge (completed (3/13)  -Repeat BAL cultures from 3/11 showing 4+ growth Stenotrophomonas maltophilia. Could be colonization given prior BAL growth of same, however due to recent intubation with prolonged corticosteroid theraphy, will begin treating as true pathogen. Patient otherwise remains afebrile, no leukocytosis. CRP with down trending.  Previously on Bactrim from 3/13 to 3/18, discontinued due to worsening ELIESER  Plan:  -S/P 14d Minocycline course to tx stenotrophomonas PNA, completed 3/27   -IV steroids as above  -Continue  minocycline sputum Cx growing steno (3/31)       MSK/Skin:  Diagnosis: Midline incision wound with poor wound healing-  -General surgery performed staple removal of the patient's midline incision on 3/12 with some skin signs appreciated at the inferior aspect of the wound without obvious pus drainage. Overnight  3/13, wound dehiscence progressed requiring general surgery reevaluation. Red/brown aspirate noted, fascia intact. Wet-to-dry dressings in place. General surgery following for next Epson wound care.  -Poor wound healing in setting of high-dose steroid therapy.  No  exam no obvious signs of infection at this time.   -S/P wound vac replacement 3/13 as per gen surgery. No active concerns for cellulitis.  Plan:  -Wound VAC management as per general surgery  -Wound care for peritracheostomy wound  -Abdominal wound care as per general surgery  -Routine monitoring     Diagnosis: Critical illness myopathy  -Likely exacerbated by high-dose steroid therapy  Plan:  -Continue to wean steroids as above  -Aggressive PT/OT once clinically stable        Disposition: Critical care    ICU Core Measures     Vented Patient  VAP Bundle  VAP bundle ordered     A: Assess, Prevent, and Manage Pain Has pain been assessed? Yes  Need for changes to pain regimen? NA   B: Both Spontaneous Awakening Trials (SATs) and Spontaneous Breathing Trials (SBTs) Plan to perform spontaneous awakening trial today? N/A   Plan to perform spontaneous breathing trial today? N/A   Obvious barriers to extubation? NA   C: Choice of Sedation RASS Goal: N/A patient not on sedation  Need for changes to sedation or analgesia regimen? NA   D: Delirium CAM-ICU: Negative   E: Early Mobility  Plan for early mobility? Yes   F: Family Engagement Plan for family engagement today? Yes       Antibiotic Review: Patient on appropriate coverage based on culture data.  and Infectious disease consulted    Review of Invasive Devices:    Ortiz Plan: voiding trial today        Prophylaxis:  VTE Contraindicated secondary to: Plt <50   Stress Ulcer  covered bypantoprazole (PROTONIX) injection 40 mg [[id number]]        Significant 24hr Events       Overnight: More stool output noted within abd wound, gas, distended abd. BCX x2 obtained, lactate obtained.      Subjective   Unable to obtain  ROS as below    Review of Systems   Unable to perform ROS: Acuity of condition        Objective                            Vitals I/O      Most Recent Min/Max in 24hrs   Temp 98.2 °F (36.8 °C) Temp  Min: 97.7 °F (36.5 °C)  Max: 100.6 °F (38.1 °C)   Pulse (!) 141 Pulse  Min: 120  Max: 150   Resp (!) 28 Resp  Min: 20  Max: 34   /65 BP  Min: 108/55  Max: 152/79   O2 Sat 95 % SpO2  Min: 95 %  Max: 100 %     Vent: APVcmv  16/450/peep6/40%    LDA  Peripherals x3  Ileostomy RUQ, draining bilious o/p  Wound vac  NGT   Ortiz  Surgical airway , cuffed       Intake/Output Summary (Last 24 hours) at 4/4/2024 0726  Last data filed at 4/4/2024 0606  Gross per 24 hour   Intake 1713.22 ml   Output 1350 ml   Net 363.22 ml       Diet NPO    Invasive Monitoring             Physical Exam   Physical Exam  Eyes:      Pupils: Pupils are equal, round, and reactive to light.   Skin:     General: Skin is warm and dry.      Findings: Wound present.      Comments: Abd wound with visible stoma from ostomy site, fecal contamination   HENT:      Nose: No epistaxis.      Mouth/Throat:      Mouth: Mucous membranes are moist.   Neck:      Trachea: Tracheostomy present.   Cardiovascular:      Rate and Rhythm: Regular rhythm. Tachycardia present.      Heart sounds: No murmur heard.  Musculoskeletal:         General: No swelling.      Right lower leg: No edema.      Left lower leg: No edema.   Abdominal: General: An ostomy site is present. There is distension and ostomy site.     Palpations: Abdomen is soft.      Tenderness: There is no abdominal tenderness.      Comments: Wound vac   Constitutional:       Appearance: She is ill-appearing.      Interventions: She is intubated. She is not sedated.  Pulmonary:      Effort: Tachypnea present. No accessory muscle usage, respiratory distress or accessory muscle usage. She is intubated.      Breath sounds: Normal breath sounds.   Neurological:      Comments: Intubated   , Lethargic appearing  and  Unable to assess strength due to profound weakness in all extremities    Genitourinary/Anorectal:     Comments: Ortiz draining clear yellow urine  Ortiz present.          Diagnostic Studies      EKG: no new  Imaging: no new I have personally reviewed pertinent reports.       Medications:  Scheduled PRN   calcium gluconate, ,   chlorhexidine, 15 mL, Q12H SARTHAK  fluconazole, 400 mg, Q24H  lacosamide, 100 mg, Q12H  levalbuterol, 1.25 mg, TID  methylPREDNISolone sodium succinate, 30 mg, Daily  micafungin, 100 mg, Q24H  minocycline, 200 mg, Q12H  nystatin, , BID  pantoprazole, 40 mg, Q12H SARTHAK  piperacillin-tazobactam, 4.5 g, Q8H  polyethylene glycol, 17 g, Daily  sodium chloride, 2 spray, BID  sodium chloride, 4 mL, TID  [START ON 4/5/2024] sulfamethoxazole-trimethoprim, 160 mg of trimethoprim, Once per day on Monday Wednesday Friday      acetaminophen, 650 mg, Q6H PRN  calcium gluconate, ,   fentaNYL, 25 mcg, Q1H PRN  ondansetron, 4 mg, Q6H PRN  sodium chloride, 1 Application, Q1H PRN       Continuous    dextrose, 75 mL/hr  insulin regular (HumuLIN R,NovoLIN R) 1 Units/mL in sodium chloride 0.9 % 100 mL infusion, 0.3-21 Units/hr, Last Rate: 0.5 Units/hr (04/04/24 0606)           Labs:    CBC    Recent Labs     04/03/24  0809 04/03/24 2029 04/04/24  0015 04/04/24 0118   WBC 8.97   < > 10.66* 11.43*   HGB 8.1*   < > 7.8* 8.0*   HCT 24.2*   < > 23.5* 24.7*   PLT 65*   < > 58* 70*   BANDSPCT 2  --   --  10*    < > = values in this interval not displayed.     BMP    Recent Labs     04/04/24 0118 04/04/24  0425   SODIUM 146 148*   K 3.9 3.8   * 113*   CO2 18* 19*   AGAP 16* 16*   * 120*   CREATININE 1.65* 1.63*   CALCIUM 10.3* 10.1             Coags    Recent Labs     04/04/24 0118 04/04/24  0425   INR 1.92* 2.00*   PTT 40*  --         Additional Electrolytes  Recent Labs     04/03/24  0430 04/04/24  0425   MG 2.5 2.5   PHOS 6.9* 6.2*          Blood Gas    No recent results  Recent Labs     04/04/24  0647    PHVEN 7.284*   [id number] 38.5*   PO2VEN 64.3*   [id number] 17.8*   BEVEN -8.1   [id number] 90.3*      LFTs  Recent Labs     04/03/24  0430   ALT 29   AST 16   ALKPHOS 210*   ALB 2.4*   TBILI 1.22*           Infectious  No recent results     Bcx x2 (3/31) Yeast, ID panel- candida albicans  Sputum Cx (3/31)- Stenotrophomonas maltophilia    quantiferon gold pending  Cryptococcal pending  Aspergillus pending    COVID ag 3/25 negative  COVID ag 3/27 negative    BAL 3/11   --4+ steno malto., 2+ candida  --Viral Cx neg  --Fungal 4+ candida  CMV neg  PCP smear neg  COVID pcr + on 3/11 Glucose  Recent Labs     04/02/24  0802 04/03/24  0430 04/04/24  0118 04/04/24  0425   GLUC 125 142*  142* 185* 107               Joe Lazcano DO

## 2024-04-04 NOTE — PROGRESS NOTES
"Progress Note  Carla Hunt 59 y.o. female MRN: [id number]  [id number]    Assessment  59F with cecal volvulus, lacunar infarct, SAH s/p 2/20 dx lap, ex lap, ileocectomy, mucus fistula, end ileostomy. 3/13 perc trach.    Plan  - NPO for OR today for abdominal wall debridement  - f/u LFTs, TEG  - continue abx  - DVT ppx    Subjective/Objective   Unable to assess 2/2 mental status.    Tachy to 141, tachypnic, other VSS on APV 16/450/6/40%.  /65   Pulse (!) 141   Temp 98.2 °F (36.8 °C) (Oral)   Resp (!) 28   Ht 5' 8\" (1.727 m)   Wt 78.1 kg (172 lb 2.9 oz)   SpO2 95%   BMI 26.18 kg/m²     Physical Exam:  General: NAD  CV: nl rate  Lungs: APV 16/450/6/40%.  ABD: Soft, distended. Ileostomy in place. Drainage into wound. NGT in place.  Extrem: No CCE    Ileostomy: 200cc liquid stool  UOP: 1150cc    Recent Labs     04/01/24  1553 04/01/24  2013 04/03/24  0430 04/03/24  0809 04/04/24  0015 04/04/24  0118 04/04/24  0425   WBC  --    < >  --    < > 10.66* 11.43*  --    HGB  --    < >  --    < > 7.8* 8.0*  --    PLT  --    < >  --    < > 58* 70*  --    SODIUM  --    < > 145  145  --   --  146 148*   K  --    < > 3.8  3.8  --   --  3.9 3.8   CL  --    < > 109*  109*  --   --  112* 113*   CO2  --    < > 19*  19*  --   --  18* 19*   BUN  --    < > 103*  103*  --   --  116* 120*   CREATININE  --    < > 1.48*  1.48*  --   --  1.65* 1.63*   GLUC  --    < > 142*  142*  --   --  185* 107   CALCIUM  --    < > 10.0  10.0  --   --  10.3* 10.1   MG  --    < > 2.5  --   --   --  2.5   PHOS  --    < > 6.9*  --   --   --  6.2*   AST  --   --  16  --   --   --   --    ALT  --   --  29  --   --   --   --    ALKPHOS  --   --  210*  --   --   --   --    TBILI  --   --  1.22*  --   --   --   --    EGFR  --    < > 38  38  --   --  33 34   PTT 35  --   --   --   --  40*  --    INR 1.75*  --  1.96*  --   --  1.92* 2.00*    < > = values in this interval not displayed.       "

## 2024-04-04 NOTE — PROGRESS NOTES
NEPHROLOGY PROGRESS NOTE   Carla Hunt 59 y.o. female MRN: [id number]  [facility] Encounter: [id number]      ASSESSMENT/PLAN:  ELIESER: initial ELIESER due to ATN + bactrim. Creatinine peaked at 1.9 but then resolved back to baseline. Bactrim was stopped temporarily but now restarted. Secondary insult with creatinine worsening on 4/1likely ATN due to hypotension+ anemia + infection + contrast CT 4/1   Baseline creatinine 0.8-1.2   Creatinine stable at 1.65 today   Continue supportive care   Ortiz in place since 3/27 for urinary retention   Azotemia: BUN up to 120    likely related to steroids + possible GI bleed  EGD yesterday: ulcerated mucosa upper 3rd esophagus, moderate esophagitis, blood clotting in body of stomach and antrum   Anascarca   +7.1L since admission   Hypernatremia: Sodium 148   Continue D5W at 75cc/h   Follow up BMP in 12 hours   Anion gap metabolic acidosis    Hypercalcemia: corrected calcium 11.7 possibly due to immobility   Last PTH 71.4   Hyperphosphatemia   Anemia: hgb stable at 7.9  In the setting of lymphoma, gross hematuria, prior GI bleed   Acute hypoxic respiratory failure: s/p trach 3/12/24   Candida fungemia   On diflucan and micafungin per primary team   Gross hematuria: improving   S/p CBI   Urology recommended outpatient cystoscopy   Cecal volvulus s/p dx lap, ex lap, ileocectomy and end ileostomy 2/20  To OR today for abdominal wall debridement     Plan Summary:   Primary team requesting possible CRRT for elevated BUN -- will d/w attending   Continue D5W at 75cc/h   Continue q12h BMP     SUBJECTIVE:  Gross hematuria improving . No improvement in mental status.     OBJECTIVE:  Current Weight: Weight - Scale: 78.1 kg (172 lb 2.9 oz)  Vitals:    04/04/24 0900   BP: 109/57   Pulse: (!) 135   Resp: (!) 30   Temp:    SpO2: 95%       Intake/Output Summary (Last 24 hours) at 4/4/2024 0932  Last data filed at 4/4/2024 0606  Gross per 24 hour   Intake 1713.22 ml   Output 1350 ml   Net  363.22 ml       General:  ill appearing, intubated, sedated   Skin:  No rash  Eyes:  Sclerae anicteric, no periorbital edema   ENT:  ET tube in place   Neck:  Trachea midline, symmetric   Chest:  Clear to auscultation bilaterally with no wheezes, rales or rhonchi  CVS:  tachycardic   Abdomen:  Soft, nontender, nondistended  Neuro:  sedated   Extremities: anasarca   : marie in place with clear urine output       Medications:  Scheduled Meds:  Current Facility-Administered Medications   Medication Dose Route Frequency Provider Last Rate    acetaminophen  650 mg Oral Q6H PRN Moises Bowden MD      calcium gluconate           chlorhexidine  15 mL Mouth/Throat Q12H [facility] Moises Bowden MD      dextrose  75 mL/hr Intravenous Continuous Ammar Alam, DO 75 mL/hr (04/04/24 0800)    fentaNYL  25 mcg Intravenous Q1H PRN Amdandre Landam, DO      fluconazole  400 mg Intravenous Q24H Amdandre Lazcano DO      insulin regular (HumuLIN R,NovoLIN R) 1 Units/mL in sodium chloride 0.9 % 100 mL infusion  0.3-21 Units/hr Intravenous Titrated Ajit Oquendo MD 0.5 Units/hr (04/04/24 0606)    lacosamide  100 mg Intravenous Q12H Mahamed Cardenas DO      levalbuterol  1.25 mg Nebulization TID Moises Bowden MD      methylPREDNISolone sodium succinate  30 mg Intravenous Daily Emilie Soyeon Kim, MD      micafungin  100 mg Intravenous Q24H Naima Bhatia MD Stopped (04/03/24 2206)    minocycline  200 mg Per NG Tube Q12H Betzaida Sr MD      nystatin   Topical BID Moises Bowden MD      ondansetron  4 mg Intravenous Q6H PRN Moises Bowden MD      pantoprazole  40 mg Intravenous Q12H SARTHAK Bowden MD      piperacillin-tazobactam  4.5 g Intravenous Q8H Nigel Escobar DO 4.5 g (04/04/24 0810)    polyethylene glycol  17 g Per NG Tube Daily Moises Bowden MD      sodium chloride  1 Application Nasal Q1H PRN Moises Bowden MD      sodium chloride  2 spray Each Nare BID Emilie Soyeon Kim, MD      sodium chloride  4 mL Nebulization TID Moises Bowden MD      [START ON 4/5/2024]  sulfamethoxazole-trimethoprim  1 tablet Per NG Tube Once per day on Monday Wednesday Friday Betzaida Sr MD         PRN Meds:.  acetaminophen    calcium gluconate    fentaNYL    ondansetron    sodium chloride    Continuous Infusions:dextrose, 75 mL/hr, Last Rate: 75 mL/hr (04/04/24 0800)  insulin regular (HumuLIN R,NovoLIN R) 1 Units/mL in sodium chloride 0.9 % 100 mL infusion, 0.3-21 Units/hr, Last Rate: 0.5 Units/hr (04/04/24 0606)        Laboratory Results:  Results from last 7 days   Lab Units 04/04/24  0425 04/04/24  0118 04/04/24  0015 04/03/24  2029 04/03/24  0809 04/03/24  0430 04/02/24  2153 04/02/24 2007 04/02/24  0837 04/02/24  0802 04/01/24 2013 04/01/24  0430 03/31/24  1235 03/31/24  0545 03/31/24  0044 03/30/24  0454 03/29/24  1752 03/29/24  0556   WBC Thousand/uL  --  11.43* 10.66* 10.44* 8.97  --  7.68 7.96 7.75  --    < > 5.29  --  2.47*   < > 2.72*  --  1.15*   HEMOGLOBIN g/dL  --  8.0* 7.8* 7.8* 8.1*  --  6.7* 6.8* 7.6*  --    < > 7.3*   < > 6.1*   < > 7.6*  --  7.6*   HEMATOCRIT %  --  24.7* 23.5* 23.5* 24.2*  --  20.9* 20.4* 23.1*  --    < > 22.6*   < > 19.5*   < > 23.6*  --  23.9*   PLATELETS Thousands/uL  --  70* 58* 65* 65*  --  46* 45* 64*  --    < > 36*  --  51*   < > 21*  --  22*   SODIUM mmol/L 148* 146  --   --   --  145  145  --   --   --  141  --  143  --  141  --  141   < > 137   POTASSIUM mmol/L 3.8 3.9  --   --   --  3.8  3.8  --   --   --  4.4  --  3.6  --  3.7  --  3.5   < > 3.5   CHLORIDE mmol/L 113* 112*  --   --   --  109*  109*  --   --   --  110*  --  109*  --  108  --  112*   < > 110*   CO2 mmol/L 19* 18*  --   --   --  19*  19*  --   --   --  20*  --  21  --  21  --  18*   < > 18*   BUN mg/dL 120* 116*  --   --   --  103*  103*  --   --   --  105*  --  104*  --  78*  --  61*   < > 56*   CREATININE mg/dL 1.63* 1.65*  --   --   --  1.48*  1.48*  --   --   --  1.44*  --  1.24  --  0.96  --  0.70   < > 0.69   CALCIUM mg/dL 10.1 10.3*  --   --   --  10.0  10.0  --    --   --  10.3*  --  10.6*  --  10.2  --  10.0   < > 9.5   MAGNESIUM mg/dL 2.5  --   --   --   --  2.5  --   --   --  2.4  --  2.5  --  2.6  --  2.8*  --  1.7*   PHOSPHORUS mg/dL 6.2*  --   --   --   --  6.9*  --   --   --  6.0*  --  5.5*  --  5.5*  --  4.2  --  3.2    < > = values in this interval not displayed.

## 2024-04-04 NOTE — PHYSICAL THERAPY NOTE
Physical Therapy Cancellation Note    Patient's Name: Carla Hunt       04/04/24 0910   PT Last Visit   PT Visit Date 04/04/24   Note Type   Note Type Cancelled Session   Cancel Reasons Patient to operating room     Pt pending OR today for abdominal wall debridement. Will defer PT today and follow as clinical course allows.    Laura Kern, PT, DPT

## 2024-04-04 NOTE — ASSESSMENT & PLAN NOTE
S/p ostomy. Surgery monitoring closely, patient undergoing abdominal wall debridement later today.

## 2024-04-04 NOTE — PROGRESS NOTES
"Progress Note - Urology  Carla Hunt 1965, 59 y.o. female MRN: [id number]    Unit/Bed#: [facility] 10 Encounter: [id number]    Gross hematuria  Assessment & Plan  Resolved   Hgb 7.9  Platelets 68, improving  S/p placement of 18 French three-way Ortiz catheter  Continue manual irrigation as needed  CBI clamped  CT did not demonstrate clot burden within the bladder  Continue to trend labs  No need for urological intervention at this time  Recommend outpatient cystoscopy for gross hematuria once she recovers from this acute episode    Urinary retention  Assessment & Plan  S/p PE Ortiz catheter insertion 3/27/2024 urinary retention  Voiding trial and outpatient/rehab setting when medically stabilized  Monitor I&O  Creat 1.63           * Acute respiratory failure with hypoxia (HCC)  Assessment & Plan  Patient has tracheostomy on ventilator  Management per critical care/pulmonary team        Urology will sign off but remain available for any further inpatient needs. Please feel free to contact the provider currently covering the Urology [facility] role for this campus with questions or concerns.      Subjective: Hematuria currently resolved with CBI clamped.  Patient is going to the OR later today for abdominal washout.      Review of Systems   Unable to perform ROS: Intubated       Objective:    Vitals: Blood pressure 134/71, pulse (!) 126, temperature 99.9 °F (37.7 °C), resp. rate (!) 24, height 5' 8\" (1.727 m), weight 78.1 kg (172 lb 2.9 oz), SpO2 96%.,Body mass index is 26.18 kg/m².  INS & OUTS:  I/O last 24 hours:  In: 3173.1 [I.V.:1090.1; Blood:290; NG/GT:250; IV Piggyback:1275; Feedings:268]  Out: 1600 [Urine:1400; Stool:200]    Physical Exam  Constitutional:       Interventions: She is sedated and intubated.   Pulmonary:      Effort: She is intubated.   Genitourinary:     Comments: Ortiz catheter draining clear yellow urine with CBI clamped.        Imaging:  CT ABDOMEN AND PELVIS WITH IV CONTRAST   "   INDICATION: blood clots in urine, currently doing cbi.     COMPARISON: CT chest/abdomen/pelvis dated 3/10/2024.     TECHNIQUE: CT examination of the abdomen and pelvis was performed. Multiplanar 2D reformatted images were created from the source data.     This examination, like all CT scans performed in the [facility], was performed utilizing techniques to minimize radiation dose exposure, including the use of iterative reconstruction and automated exposure control. Radiation dose length   product (DLP) for this visit: 705.77 mGy-cm     IV Contrast: 100 mL of iohexol (OMNIPAQUE)  Enteric Contrast: Not administered.     FINDINGS:     ABDOMEN     LOWER CHEST: Persistent bilateral lower lobe airspace consolidation.     LIVER/BILIARY TREE: Unremarkable.     GALLBLADDER: No calcified gallstones. No pericholecystic inflammatory change.     SPLEEN: Unremarkable.     PANCREAS: Unremarkable.     ADRENAL GLANDS: Unremarkable.     KIDNEYS/URETERS: Numerous bilateral renal cysts measuring up to 2.3 cm on the left and other subcentimeter hypodensities which are too small to characterize. Numerous bilateral renal calculi/calcifications redemonstrated probably due to medullary sponge   kidney. No hydronephrosis.     STOMACH AND BOWEL: No evidence for bowel obstruction. The patient is again noted to be status post ileocolectomy with right lower quadrant ileostomy. Associated edema and small foci of subcutaneous emphysema consistent with postsurgical change are not   significantly changed. No focal drainable fluid collection is identified.     APPENDIX: No findings to suggest appendicitis.     ABDOMINOPELVIC CAVITY: Trace ascites. No pneumoperitoneum. No lymphadenopathy.     VESSELS: Unremarkable for patient's age.     PELVIS     REPRODUCTIVE ORGANS: Unremarkable for patient's age.     URINARY BLADDER: Ortiz catheter within the urinary bladder.     ABDOMINAL WALL/INGUINAL REGIONS: Unremarkable.     BONES: No acute  fracture or suspicious osseous lesion.     IMPRESSION:     Stable postsurgical changes status post ileocolectomy with right lower quadrant ileostomy with persistent associated inflammatory changes and subcutaneous emphysema. No focal drainable fluid collection identified. Trace ascites.     Persistent bilateral lower lobe atelectasis.        Workstation performed: [id number]  Imaging reviewed - both report and images personally reviewed.     Labs:  Recent Labs     24  0837 24  0824  0015 24  0118 24  0752   WBC 5.73 7.03 7.75 7.96 7.68 8.97 10.44* 10.66* 11.43* 12.73*       Recent Labs     24  0837 24  0809 24  0015 248 24  0752   HGB 6.7* 7.9* 7.6* 6.8* 6.7* 8.1* 7.8* 7.8* 8.0* 7.9*     Recent Labs     24  0809 24  0015 24  0118 24  0752   HCT 20.7* 24.3* 23.1* 20.4* 20.9* 24.2* 23.5* 23.5* 24.7* 23.4*     Recent Labs     24  0802 24  0430 24  0118 24  0425   CREATININE 1.44* 1.48*  1.48* 1.65* 1.63*     Lab Results   Component Value Date    HGB 7.9 (L) 2024    HCT 23.4 (L) 2024    WBC 12.73 (H) 2024    PLT 68 (L) 2024     Lab Results   Component Value Date     2017    K 3.8 2024     (H) 2024    CO2 19 (L) 2024     (H) 2024    CREATININE 1.63 (H) 2024    CALCIUM 10.1 2024    GLUCOSE 118 2024       History:    Past Medical History:   Diagnosis Date    Hx of hypercalcemia     Lymphoma (HCC) 2021    Parathyroid disease (HCC)     Seizures (HCC)     Situational depression     24 yr old son  from drug overdose     Past Surgical History:   Procedure Laterality Date     CRANIOTOMY FOR TEMPORAL LOBECTOMY Left 2007    NE LAPS ABD PRTM&OMENTUM DX W/WO SPEC BR/WA SPX N/A 2/20/2024    Procedure: LAPAROSCOPY DIAGNOSTIC,EXPLORATORY LAPAROTOMY, RIGHT KARY COLECTOMY,ILEOSTOMY, MUCUS FISTULA;  Surgeon: Lauryn Ullrich, DO;  Location: [facility];  Service: General     Family History   Problem Relation Age of Onset    Diabetes Mother     Cancer Father      Social History     Socioeconomic History    Marital status: /Civil Union     Spouse name: None    Number of children: None    Years of education: None    Highest education level: None   Occupational History    None   Tobacco Use    Smoking status: Never    Smokeless tobacco: Never   Vaping Use    Vaping status: Never Used   Substance and Sexual Activity    Alcohol use: Not Currently    Drug use: Never    Sexual activity: None   Other Topics Concern    None   Social History Narrative    None     Social Determinants of Health     Financial Resource Strain: Not on file   Food Insecurity: No Food Insecurity (1/11/2024)    Hunger Vital Sign     Worried About Running Out of Food in the Last Year: Never true     Ran Out of Food in the Last Year: Never true   Transportation Needs: No Transportation Needs (1/11/2024)    PRAPARE - Transportation     Lack of Transportation (Medical): No     Lack of Transportation (Non-Medical): No   Physical Activity: Not on file   Stress: Not on file   Social Connections: Not on file   Intimate Partner Violence: Not on file   Housing Stability: Low Risk  (1/11/2024)    Housing Stability Vital Sign     Unable to Pay for Housing in the Last Year: No     Number of Places Lived in the Last Year: 1     Unstable Housing in the Last Year: No       The following portions of the patient's history were reviewed and updated as appropriate: allergies, current medications, past family history, past medical history, past social history, past surgical history and problem list    DANYELLE Hansen  Date: 4/4/2024 Time: 3:59  PM

## 2024-04-04 NOTE — RESPIRATORY THERAPY NOTE
RT Ventilator Management Note  Carla Hunt 59 y.o. female MRN: [id number]  Unit/Bed#: [facility] 10 Encounter: [id number]      Daily Screen         4/3/2024  0732 4/4/2024  0800          Patient safety screen outcome:: Failed Failed      Not Ready for Weaning due to:: Underline problem not resolved Underline problem not resolved                Physical Exam:   Assessment Type: During-treatment  General Appearance: Sleeping  Respiratory Pattern: Tachypneic  Chest Assessment: Chest expansion symmetrical  Bilateral Breath Sounds: Scattered  Cough: Productive  Suction: Trach  O2 Device: ventilator      Resp Comments: Pt is stable and tolerating vent settings. Chest PT and neb treatment given.

## 2024-04-04 NOTE — ASSESSMENT & PLAN NOTE
Multi-specialty team treating individual factors that may contribute towards AMS. Provigil held at this time.  Identified to have fungemia, treatment started. Nephrology to initiate CRRT today.  Unfortunately, Carla remains encephalopathic, not interactive during time of encounter.

## 2024-04-04 NOTE — PROCEDURES
Temporary HD Catheter    Date/Time: 4/4/2024 5:31 PM    Performed by: Nigel Escobar DO  Authorized by: Nigel Escobar DO    Patient location:  Bedside  Other Assisting Provider: No    Consent:     Consent obtained:  Written ()    Consent given by:  Spouse    Risks discussed:  Arterial puncture, infection, pneumothorax, bleeding and incorrect placement    Alternatives discussed:  Alternative treatment, no treatment and observation  Universal protocol:     Patient identity confirmed:  Arm band, provided demographic data and anonymous protocol, patient vented/unresponsive  Pre-procedure details:     Hand hygiene: Hand hygiene performed prior to insertion      Sterile barrier technique: All elements of maximal sterile technique followed      Skin preparation:  2% chlorhexidine    Skin preparation agent: Skin preparation agent completely dried prior to procedure    Indications:     Central line indications: dialysis      Site selection rationale:  CRRT  Anesthesia (see MAR for exact dosages):     Anesthesia method:  Local infiltration    Local anesthetic:  Lidocaine 1% w/o epi  Procedure details:     Location:  Right internal jugular    Vessel type: vein      Laterality:  Right    Approach: percutaneous technique used      Patient position:  Flat    Catheter type:  Triple lumen    Catheter length:  16 cm    Landmarks identified: yes      Ultrasound guidance: yes      Number of attempts:  1    Successful placement: yes      Vessel of catheter tip end:  SVC  Post-procedure details:     Post-procedure:  Line sutured    Assessment:  Blood return through all ports, no pneumothorax on x-ray, placement verified by x-ray and free fluid flow    Patient tolerance of procedure:  Tolerated well, no immediate complications    Observer name:  Attending at bedside

## 2024-04-04 NOTE — PROGRESS NOTES
Nutrition Follow-Up/Recommendations:    Pt with increased protein needs with starting CRRT, is also receiving minocycline q12 hrs which requires holding of TF per administration instructions.     When able to restart TF, recommend changing to Nepro, start at 25mL/hr x 20hrs/day (hold TF for one hour before and after each minocycline administration).   Advance by 10mL/hr q4 hrs as tolerated to goal of 45mL/hr x20 hrs/day + add 2 packets Prosource Liquid Protein BID (4 packets total daily).   At goal, this will provide 1860 kcals, 133g protein, 894mL water including water for prosource administration.     Additional free water flushes per Nephrology and/or critical care; suggest at least 30mL q4 hrs to help maintain tube patency.

## 2024-04-04 NOTE — CASE MANAGEMENT
Case Management Discharge Planning Note    Patient name Carla Hunt  Location [facility] 10/[facility] 10 MRN [id number]  : 1965 Date 2024       Current Admission Date: 1/10/2024  Current Admission Diagnosis:Acute respiratory failure with hypoxia (HCC)   Patient Active Problem List    Diagnosis Date Noted    Fungemia 2024    Pancytopenia ([facility]) 2024    Gross hematuria 2024    Drowsiness 2024    Urinary retention 2024    Acute kidney injury (HCC) 2024    Cecal volvulus ([facility]) 2024    Palliative care patient 2024    Goals of care, counseling/discussion 2024    Hypotension 2024    Seizure disorder ([facility]) 2024    Acute respiratory failure with hypoxia (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) 2024    Abnormal CT of the head 2024    Nephrolithiasis 2021    Encephalopathy 2020    Other specified anxiety disorders 2019    Reactive depression 2019    Hidradenitis suppurativa of left axilla 2019    Anxiety state 2018    Disorder of parathyroid gland ([facility]) 2018    Eczema 2018    Grand mal status ([facility]) 2018    Hypercalcemia 2018    Hypertensive disorder 2018    Open wound of hand except fingers 2018    Otitis externa 2018    Overweight 2018    Pain in face 2018    Skin sensation disturbance 2018    Subjective visual disturbance 2018    Long term current use of hormonal contraceptive 10/23/2018    Rosacea 2015      LOS (days): 85  Geometric Mean LOS (GMLOS) (days):   Days to GMLOS:     OBJECTIVE:  Risk of Unplanned Readmission Score: 29.98         Current admission status: Inpatient   Preferred Pharmacy:   [id number] [place] - [address]  [place] [address]  Phone:  [phone] Fax: [phone]    [facility] - [place], [place] - [address]  [address] [facility] [address]  Phone: [phone] Fax: [phone]    Primary Care Provider: Tony Guevara MD    Primary Insurance: [employer]  Secondary Insurance: MEDICARE    DISCHARGE DETAILS:    Other Referral/Resources/Interventions Provided:  Referral Comments: TT earlier today from palliative team informing CM they met with pt's  & he does not want any snf or optioning tbd. Cm also met with  on unit to inqurie if he had any further questions. He declined & states his daughter is unable to come into hospital today. He is aware cm available.

## 2024-04-04 NOTE — SOCIAL WORK
The Palliative SW and provider NEGRITA Ramirez met with the pt  at bedside.  He discussed his concerns about his wife.  He also shared some of his frustrations.  The team listened and supported the pt .  During the discussion he stated the pt would never want long term care.  He discussed his past experiences with loved ones placed in LTC.  He would prefer the pt DC to [facility].  It was explained that the pt would be required to participate in 3 hours daily physical therapy.    The SW attempted to explain there is paperwork to be completed for LTC and the CM was possibly being pro active.    The pt  states he will not be signing any paperwork and the pt will not be going into LTC.    The team acknowledged his feelings and will respect the pt wishes.

## 2024-04-04 NOTE — ASSESSMENT & PLAN NOTE
Admitted, severe COVID course complicated by incidences of pneumonia  Patient was re-intubated 3/11 with subsequent bedside trach on 3/12.   Very careful steroid wean  per critical care team.   Patient back on ventilator support, previously on trach collar and using PMV at her best

## 2024-04-04 NOTE — PROGRESS NOTES
[facility]  Progress Note  Name: Carla Hunt I  MRN: [id number]  Unit/Bed#: MICU 10 I Date of Admission: 1/10/2024   Date of Service: 4/4/2024 I Hospital Day: 85    Assessment/Plan   Goals of care, counseling/discussion  Assessment & Plan  Code Status: full - Level 1  Ongoing medical optimization without limits at this time. Hopeful for slow recovery. Min does express understanding of periodic setbacks but is hoping Carla can build some momentum to become medically stable for d/c to rehab. He does not wish for her to be in a long term care facility and is open to revisiting goals of care if she is not making improvements toward discharge.     Palliative care patient  Assessment & Plan  Time spent providing supportive listening to spouse Min at bedside. Extended offer of support to patient's children.  Min continues to be consistently present and invested in patient's care.   Also supported by her daughter, son, and robust  group of friends, brother, and natalia community  Decisional apparatus:  Patient is not competent on my exam today.  If competence is lost, patient's substitute decision maker would default to spouse Min [place] 169.  Advance Directive / Living Will / POLST:  None on file, unable to complete  Palliative care MSW continues to follow    Cecal volvulus (HCC)  Assessment & Plan  S/p ostomy. Surgery monitoring closely, patient undergoing abdominal wall debridement later today.    Encephalopathy  Assessment & Plan  Multi-specialty team treating individual factors that may contribute towards AMS. Provigil held at this time.  Identified to have fungemia, treatment started. Nephrology to initiate CRRT today.  Unfortunately, Carla remains encephalopathic, not interactive during time of encounter.    * Acute respiratory failure with hypoxia (HCC)  Assessment & Plan  Admitted, severe COVID course complicated by incidences of pneumonia  Patient was  re-intubated 3/11 with subsequent bedside trach on 3/12.   Very careful steroid wean  per critical care team.   Patient back on ventilator support, previously on trach collar and using PMV at her best           Interval history:       Patient to be started on CRRT today given ELIESER potentially secondary to ATN, hypotension, GI bleed. She is also planned for abdominal wall debridement for surgery this afternoon. Patient's spouse at bedside expresses worry about her condition. He remains hopeful for slow improvements given her previous recovery of weaning off the vent and participating with PT. However, also remains realistic  that several physicians have told him that Carla may not survive this hospital stay.     MEDICATIONS / ALLERGIES:     all current active meds have been reviewed    No Known Allergies    OBJECTIVE:    Physical Exam  Physical Exam  Constitutional:       General: She is not in acute distress.     Appearance: She is ill-appearing.   Cardiovascular:      Rate and Rhythm: Tachycardia present.   Pulmonary:      Comments: Ventilated via trach  Abdominal:      Comments: Visualized midline wound and ostomy via surgery documentation and media tab   Genitourinary:     Comments: Urinary catheter in place  Musculoskeletal:         General: Swelling present.   Skin:     General: Skin is warm and dry.   Neurological:      Comments: Not responding to tactile or verbal stimuli   Psychiatric:      Comments: calm         Lab Results:   Results from last 7 days   Lab Units 04/04/24  0752 04/04/24  0118 04/04/24  0015 04/03/24 2029 04/03/24  0809   WBC Thousand/uL 12.73* 11.43* 10.66*   < > 8.97   HEMOGLOBIN g/dL 7.9* 8.0* 7.8*   < > 8.1*   HEMATOCRIT % 23.4* 24.7* 23.5*   < > 24.2*   PLATELETS Thousands/uL 68* 70* 58*   < > 65*   MONO PCT % 13* 2*  --   --  4   EOS PCT % 0 0  --   --  0    < > = values in this interval not displayed.     Results from last 7 days   Lab Units 04/04/24  0752 04/04/24  0425  04/04/24  0118 04/03/24  0430 03/31/24  0545 03/30/24  0454   POTASSIUM mmol/L  --  3.8 3.9 3.8  3.8   < > 3.5   CHLORIDE mmol/L  --  113* 112* 109*  109*   < > 112*   CO2 mmol/L  --  19* 18* 19*  19*   < > 18*   BUN mg/dL  --  120* 116* 103*  103*   < > 61*   CREATININE mg/dL  --  1.63* 1.65* 1.48*  1.48*   < > 0.70   CALCIUM mg/dL  --  10.1 10.3* 10.0  10.0   < > 10.0   ALK PHOS U/L 381*  --   --  210*  --  196*   ALT U/L 34  --   --  29  --  45   AST U/L 21  --   --  16  --  12*    < > = values in this interval not displayed.     Imaging Studies: reviewed pertinent studies   EKG, Pathology, and Other Studies: reviewed pertinent studies    Counseling / Coordination of Care    Total floor / unit time spent today 55+ minutes. Greater than 50% of total time was spent with the patient and / or family counseling and / or coordination of care. A description of the counseling / coordination of care: time spent providing support to patient's spouse at bedside, discussing goals of care, coordinating care with RN, primary team, nephrology, and case management.

## 2024-04-04 NOTE — RESPIRATORY THERAPY NOTE
RT Ventilator Management Note  Carla Hunt 59 y.o. female MRN: [id number]  Unit/Bed#: [facility] 10 Encounter: [id number]      Daily Screen         4/2/2024  0750 4/3/2024  0732          Patient safety screen outcome:: Failed Failed      Not Ready for Weaning due to:: Underline problem not resolved Underline problem not resolved                Physical Exam:   Assessment Type: Assess only  General Appearance: Sleeping, Sedated  Respiratory Pattern: (P) Assisted, Tachypneic  Chest Assessment: (P) Chest expansion symmetrical  Bilateral Breath Sounds: (P) Coarse, Rhonchi  Cough: (P) Productive  Suction: Trach  O2 Device: Ventilator      Resp Comments: (P) Pt has been stable on apv/cmv settings. No changes made. No distress noted

## 2024-04-04 NOTE — PROGRESS NOTES
Pastoral Care Progress Note    4/3/2024  Patient: Carla Hunt : 1965  Admission Date & Time: 1/10/2024 1941  MRN: [id number] CSN: [id number]       provided silent pastoral presence at pt's bedside, lifting prayer for patient and staff. No family present.   remains available.       24 2300   Clinical Encounter Type   Visited With Patient   Routine Visit Follow-up   Referral From Nurse   Patient Spiritual Encounters   Spiritual Encounter Notes  provided silent pastoral presence at pt's bedside at suggestion of nurse. Held pt and staff in silent prayer.  remains available.

## 2024-04-05 LAB
ABO GROUP BLD BPU: NORMAL
ACANTHOCYTES BLD QL SMEAR: PRESENT
ALBUMIN SERPL BCP-MCNC: 2.4 G/DL (ref 3.5–5)
ALP SERPL-CCNC: 346 U/L (ref 34–104)
ALT SERPL W P-5'-P-CCNC: 32 U/L (ref 7–52)
ANION GAP SERPL CALCULATED.3IONS-SCNC: 11 MMOL/L (ref 4–13)
ANION GAP SERPL CALCULATED.3IONS-SCNC: 12 MMOL/L (ref 4–13)
ANION GAP SERPL CALCULATED.3IONS-SCNC: 13 MMOL/L (ref 4–13)
ANISOCYTOSIS BLD QL SMEAR: PRESENT
AST SERPL W P-5'-P-CCNC: 20 U/L (ref 13–39)
BASO STIPL BLD QL SMEAR: PRESENT
BASOPHILS # BLD MANUAL: 0 THOUSAND/UL (ref 0–0.1)
BASOPHILS NFR MAR MANUAL: 0 % (ref 0–1)
BILIRUB DIRECT SERPL-MCNC: 0.88 MG/DL (ref 0–0.2)
BILIRUB SERPL-MCNC: 1.42 MG/DL (ref 0.2–1)
BPU ID: NORMAL
BUN SERPL-MCNC: 51 MG/DL (ref 5–25)
BUN SERPL-MCNC: 65 MG/DL (ref 5–25)
BUN SERPL-MCNC: 76 MG/DL (ref 5–25)
BURR CELLS BLD QL SMEAR: PRESENT
BURR CELLS BLD QL SMEAR: PRESENT
CA-I BLD-SCNC: 1.38 MMOL/L (ref 1.12–1.32)
CA-I BLD-SCNC: 1.41 MMOL/L (ref 1.12–1.32)
CA-I BLD-SCNC: 1.41 MMOL/L (ref 1.12–1.32)
CALCIUM SERPL-MCNC: 10 MG/DL (ref 8.4–10.2)
CALCIUM SERPL-MCNC: 10 MG/DL (ref 8.4–10.2)
CALCIUM SERPL-MCNC: 9.8 MG/DL (ref 8.4–10.2)
CHLORIDE SERPL-SCNC: 108 MMOL/L (ref 96–108)
CHLORIDE SERPL-SCNC: 108 MMOL/L (ref 96–108)
CHLORIDE SERPL-SCNC: 109 MMOL/L (ref 96–108)
CK SERPL-CCNC: 25 U/L (ref 26–192)
CMV SPEC QL CULT: NORMAL
CO2 SERPL-SCNC: 20 MMOL/L (ref 21–32)
CO2 SERPL-SCNC: 20 MMOL/L (ref 21–32)
CO2 SERPL-SCNC: 21 MMOL/L (ref 21–32)
CREAT SERPL-MCNC: 0.76 MG/DL (ref 0.6–1.3)
CREAT SERPL-MCNC: 0.88 MG/DL (ref 0.6–1.3)
CREAT SERPL-MCNC: 1.07 MG/DL (ref 0.6–1.3)
CROSSMATCH: NORMAL
CROSSMATCH: NORMAL
DOHLE BOD BLD QL SMEAR: PRESENT
EOSINOPHIL # BLD MANUAL: 0 THOUSAND/UL (ref 0–0.4)
EOSINOPHIL NFR BLD MANUAL: 0 % (ref 0–6)
ERYTHROCYTE [DISTWIDTH] IN BLOOD BY AUTOMATED COUNT: 18.7 % (ref 11.6–15.1)
ERYTHROCYTE [DISTWIDTH] IN BLOOD BY AUTOMATED COUNT: 19.3 % (ref 11.6–15.1)
ERYTHROCYTE [DISTWIDTH] IN BLOOD BY AUTOMATED COUNT: 19.7 % (ref 11.6–15.1)
GALACTOMANNAN AG SPEC IA-ACNC: 0.04 INDEX (ref 0–0.49)
GFR SERPL CREATININE-BSD FRML MDRD: 57 ML/MIN/1.73SQ M
GFR SERPL CREATININE-BSD FRML MDRD: 72 ML/MIN/1.73SQ M
GFR SERPL CREATININE-BSD FRML MDRD: 86 ML/MIN/1.73SQ M
GLUCOSE SERPL-MCNC: 102 MG/DL (ref 65–140)
GLUCOSE SERPL-MCNC: 127 MG/DL (ref 65–140)
GLUCOSE SERPL-MCNC: 135 MG/DL (ref 65–140)
GLUCOSE SERPL-MCNC: 78 MG/DL (ref 65–140)
GLUCOSE SERPL-MCNC: 81 MG/DL (ref 65–140)
GLUCOSE SERPL-MCNC: 83 MG/DL (ref 65–140)
GLUCOSE SERPL-MCNC: 85 MG/DL (ref 65–140)
GLUCOSE SERPL-MCNC: 92 MG/DL (ref 65–140)
GLUCOSE SERPL-MCNC: 98 MG/DL (ref 65–140)
HCT VFR BLD AUTO: 19.7 % (ref 34.8–46.1)
HCT VFR BLD AUTO: 28.5 % (ref 34.8–46.1)
HCT VFR BLD AUTO: 29 % (ref 34.8–46.1)
HCT VFR BLD AUTO: 29.1 % (ref 34.8–46.1)
HGB BLD-MCNC: 6.5 G/DL (ref 11.5–15.4)
HGB BLD-MCNC: 9.3 G/DL (ref 11.5–15.4)
HGB BLD-MCNC: 9.4 G/DL (ref 11.5–15.4)
HGB BLD-MCNC: 9.4 G/DL (ref 11.5–15.4)
INR PPP: 1.63 (ref 0.84–1.19)
LG PLATELETS BLD QL SMEAR: PRESENT
LG PLATELETS BLD QL SMEAR: PRESENT
LYMPHOCYTES # BLD AUTO: 0 % (ref 14–44)
LYMPHOCYTES # BLD AUTO: 0 THOUSAND/UL (ref 0.6–4.47)
LYMPHOCYTES # BLD AUTO: 0.13 THOUSAND/UL (ref 0.6–4.47)
LYMPHOCYTES # BLD AUTO: 0.4 THOUSAND/UL (ref 0.6–4.47)
LYMPHOCYTES # BLD AUTO: 1 % (ref 14–44)
LYMPHOCYTES # BLD AUTO: 1 % (ref 14–44)
MAGNESIUM SERPL-MCNC: 1.9 MG/DL (ref 1.9–2.7)
MAGNESIUM SERPL-MCNC: 2 MG/DL (ref 1.9–2.7)
MAGNESIUM SERPL-MCNC: 2.1 MG/DL (ref 1.9–2.7)
MCH RBC QN AUTO: 29.4 PG (ref 26.8–34.3)
MCH RBC QN AUTO: 29.5 PG (ref 26.8–34.3)
MCH RBC QN AUTO: 30.1 PG (ref 26.8–34.3)
MCHC RBC AUTO-ENTMCNC: 32.3 G/DL (ref 31.4–37.4)
MCHC RBC AUTO-ENTMCNC: 32.6 G/DL (ref 31.4–37.4)
MCHC RBC AUTO-ENTMCNC: 33 G/DL (ref 31.4–37.4)
MCV RBC AUTO: 91 FL (ref 82–98)
METAMYELOCYTES NFR BLD MANUAL: 2 % (ref 0–1)
METAMYELOCYTES NFR BLD MANUAL: 2 % (ref 0–1)
METAMYELOCYTES NFR BLD MANUAL: 3 % (ref 0–1)
MICROCYTES BLD QL AUTO: PRESENT
MICROCYTES BLD QL AUTO: PRESENT
MONOCYTES # BLD AUTO: 0.13 THOUSAND/UL (ref 0–1.22)
MONOCYTES # BLD AUTO: 0.26 THOUSAND/UL (ref 0–1.22)
MONOCYTES # BLD AUTO: 0.55 THOUSAND/UL (ref 0–1.22)
MONOCYTES NFR BLD: 1 % (ref 4–12)
MONOCYTES NFR BLD: 2 % (ref 4–12)
MONOCYTES NFR BLD: 5 % (ref 4–12)
MYELOCYTES NFR BLD MANUAL: 2 % (ref 0–1)
MYELOCYTES NFR BLD MANUAL: 3 % (ref 0–1)
MYELOCYTES NFR BLD MANUAL: 5 % (ref 0–1)
NEUTROPHILS # BLD MANUAL: 11.13 THOUSAND/UL (ref 1.85–7.62)
NEUTROPHILS # BLD MANUAL: 11.82 THOUSAND/UL (ref 1.85–7.62)
NEUTROPHILS # BLD MANUAL: 9.71 THOUSAND/UL (ref 1.85–7.62)
NEUTS BAND NFR BLD MANUAL: 15 % (ref 0–8)
NEUTS BAND NFR BLD MANUAL: 3 % (ref 0–8)
NEUTS BAND NFR BLD MANUAL: 9 % (ref 0–8)
NEUTS SEG NFR BLD AUTO: 69 % (ref 43–75)
NEUTS SEG NFR BLD AUTO: 83 % (ref 43–75)
NEUTS SEG NFR BLD AUTO: 85 % (ref 43–75)
NRBC BLD AUTO-RTO: 1 /100 WBC (ref 0–2)
NRBC BLD AUTO-RTO: 2 /100 WBC (ref 0–2)
NRBC BLD AUTO-RTO: 3 /100 WBC (ref 0–2)
PHOSPHATE SERPL-MCNC: 4.5 MG/DL (ref 2.7–4.5)
PHOSPHATE SERPL-MCNC: 4.6 MG/DL (ref 2.7–4.5)
PHOSPHATE SERPL-MCNC: 5.1 MG/DL (ref 2.7–4.5)
PLATELET # BLD AUTO: 52 THOUSANDS/UL (ref 149–390)
PLATELET # BLD AUTO: 57 THOUSANDS/UL (ref 149–390)
PLATELET # BLD AUTO: 57 THOUSANDS/UL (ref 149–390)
PLATELET BLD QL SMEAR: ABNORMAL
PMV BLD AUTO: 13.6 FL (ref 8.9–12.7)
PMV BLD AUTO: 14.3 FL (ref 8.9–12.7)
POIKILOCYTOSIS BLD QL SMEAR: PRESENT
POIKILOCYTOSIS BLD QL SMEAR: PRESENT
POLYCHROMASIA BLD QL SMEAR: PRESENT
POTASSIUM SERPL-SCNC: 4.4 MMOL/L (ref 3.5–5.3)
POTASSIUM SERPL-SCNC: 4.6 MMOL/L (ref 3.5–5.3)
POTASSIUM SERPL-SCNC: 4.7 MMOL/L (ref 3.5–5.3)
PROMYELOCYTES NFR BLD MANUAL: 1 % (ref 0–0)
PROMYELOCYTES NFR BLD MANUAL: 2 % (ref 0–0)
PROMYELOCYTES NFR BLD MANUAL: 4 % (ref 0–0)
PROT SERPL-MCNC: 5.2 G/DL (ref 6.4–8.4)
PROTHROMBIN TIME: 19.1 SECONDS (ref 11.6–14.5)
RBC # BLD AUTO: 2.16 MILLION/UL (ref 3.81–5.12)
RBC # BLD AUTO: 3.15 MILLION/UL (ref 3.81–5.12)
RBC # BLD AUTO: 3.2 MILLION/UL (ref 3.81–5.12)
RBC MORPH BLD: PRESENT
SODIUM SERPL-SCNC: 140 MMOL/L (ref 135–147)
SODIUM SERPL-SCNC: 141 MMOL/L (ref 135–147)
SODIUM SERPL-SCNC: 141 MMOL/L (ref 135–147)
TOXIC GRANULES BLD QL SMEAR: PRESENT
UNIT DISPENSE STATUS: NORMAL
UNIT PRODUCT CODE: NORMAL
UNIT PRODUCT VOLUME: 250 ML
UNIT PRODUCT VOLUME: 350 ML
UNIT PRODUCT VOLUME: 350 ML
UNIT RH: NORMAL
VARIANT LYMPHS # BLD AUTO: 2 %
WBC # BLD AUTO: 11.03 THOUSAND/UL (ref 4.31–10.16)
WBC # BLD AUTO: 12.85 THOUSAND/UL (ref 4.31–10.16)
WBC # BLD AUTO: 13.25 THOUSAND/UL (ref 4.31–10.16)
WBC TOXIC VACUOLES BLD QL SMEAR: PRESENT
WBC TOXIC VACUOLES BLD QL SMEAR: PRESENT

## 2024-04-05 PROCEDURE — 85007 BL SMEAR W/DIFF WBC COUNT: CPT | Performed by: STUDENT IN AN ORGANIZED HEALTH CARE EDUCATION/TRAINING PROGRAM

## 2024-04-05 PROCEDURE — 80048 BASIC METABOLIC PNL TOTAL CA: CPT | Performed by: STUDENT IN AN ORGANIZED HEALTH CARE EDUCATION/TRAINING PROGRAM

## 2024-04-05 PROCEDURE — 94003 VENT MGMT INPAT SUBQ DAY: CPT

## 2024-04-05 PROCEDURE — C9254 INJECTION, LACOSAMIDE: HCPCS | Performed by: STUDENT IN AN ORGANIZED HEALTH CARE EDUCATION/TRAINING PROGRAM

## 2024-04-05 PROCEDURE — 85014 HEMATOCRIT: CPT

## 2024-04-05 PROCEDURE — 99024 POSTOP FOLLOW-UP VISIT: CPT | Performed by: SURGERY

## 2024-04-05 PROCEDURE — 82550 ASSAY OF CK (CPK): CPT

## 2024-04-05 PROCEDURE — 82948 REAGENT STRIP/BLOOD GLUCOSE: CPT

## 2024-04-05 PROCEDURE — 99292 CRITICAL CARE ADDL 30 MIN: CPT | Performed by: INTERNAL MEDICINE

## 2024-04-05 PROCEDURE — 99233 SBSQ HOSP IP/OBS HIGH 50: CPT | Performed by: INTERNAL MEDICINE

## 2024-04-05 PROCEDURE — C9113 INJ PANTOPRAZOLE SODIUM, VIA: HCPCS | Performed by: STUDENT IN AN ORGANIZED HEALTH CARE EDUCATION/TRAINING PROGRAM

## 2024-04-05 PROCEDURE — 85610 PROTHROMBIN TIME: CPT

## 2024-04-05 PROCEDURE — 82330 ASSAY OF CALCIUM: CPT | Performed by: STUDENT IN AN ORGANIZED HEALTH CARE EDUCATION/TRAINING PROGRAM

## 2024-04-05 PROCEDURE — 90945 DIALYSIS ONE EVALUATION: CPT

## 2024-04-05 PROCEDURE — 83735 ASSAY OF MAGNESIUM: CPT | Performed by: STUDENT IN AN ORGANIZED HEALTH CARE EDUCATION/TRAINING PROGRAM

## 2024-04-05 PROCEDURE — 85027 COMPLETE CBC AUTOMATED: CPT | Performed by: STUDENT IN AN ORGANIZED HEALTH CARE EDUCATION/TRAINING PROGRAM

## 2024-04-05 PROCEDURE — 94640 AIRWAY INHALATION TREATMENT: CPT

## 2024-04-05 PROCEDURE — 94760 N-INVAS EAR/PLS OXIMETRY 1: CPT

## 2024-04-05 PROCEDURE — 80076 HEPATIC FUNCTION PANEL: CPT

## 2024-04-05 PROCEDURE — 84100 ASSAY OF PHOSPHORUS: CPT | Performed by: STUDENT IN AN ORGANIZED HEALTH CARE EDUCATION/TRAINING PROGRAM

## 2024-04-05 PROCEDURE — 99232 SBSQ HOSP IP/OBS MODERATE 35: CPT | Performed by: NURSE PRACTITIONER

## 2024-04-05 PROCEDURE — 85018 HEMOGLOBIN: CPT

## 2024-04-05 PROCEDURE — P9040 RBC LEUKOREDUCED IRRADIATED: HCPCS

## 2024-04-05 PROCEDURE — 94664 DEMO&/EVAL PT USE INHALER: CPT

## 2024-04-05 PROCEDURE — 99233 SBSQ HOSP IP/OBS HIGH 50: CPT | Performed by: PHYSICIAN ASSISTANT

## 2024-04-05 PROCEDURE — 84145 PROCALCITONIN (PCT): CPT

## 2024-04-05 PROCEDURE — 90945 DIALYSIS ONE EVALUATION: CPT | Performed by: INTERNAL MEDICINE

## 2024-04-05 PROCEDURE — 99291 CRITICAL CARE FIRST HOUR: CPT | Performed by: INTERNAL MEDICINE

## 2024-04-05 RX ORDER — METHYLPREDNISOLONE SODIUM SUCCINATE 40 MG/ML
10 INJECTION, POWDER, LYOPHILIZED, FOR SOLUTION INTRAMUSCULAR; INTRAVENOUS DAILY
Status: DISCONTINUED | OUTPATIENT
Start: 2024-04-09 | End: 2024-04-05

## 2024-04-05 RX ORDER — METHYLPREDNISOLONE SODIUM SUCCINATE 40 MG/ML
30 INJECTION, POWDER, LYOPHILIZED, FOR SOLUTION INTRAMUSCULAR; INTRAVENOUS DAILY
Status: COMPLETED | OUTPATIENT
Start: 2024-04-05 | End: 2024-04-05

## 2024-04-05 RX ORDER — CALCIUM GLUCONATE 20 MG/ML
1 INJECTION, SOLUTION INTRAVENOUS ONCE
Qty: 50 ML | Refills: 0 | Status: COMPLETED | OUTPATIENT
Start: 2024-04-05 | End: 2024-04-05

## 2024-04-05 RX ORDER — METHYLPREDNISOLONE SODIUM SUCCINATE 40 MG/ML
10 INJECTION, POWDER, LYOPHILIZED, FOR SOLUTION INTRAMUSCULAR; INTRAVENOUS DAILY
Status: COMPLETED | OUTPATIENT
Start: 2024-04-07 | End: 2024-04-07

## 2024-04-05 RX ORDER — METHYLPREDNISOLONE SODIUM SUCCINATE 40 MG/ML
20 INJECTION, POWDER, LYOPHILIZED, FOR SOLUTION INTRAMUSCULAR; INTRAVENOUS DAILY
Status: DISCONTINUED | OUTPATIENT
Start: 2024-04-06 | End: 2024-04-05

## 2024-04-05 RX ORDER — METHYLPREDNISOLONE SODIUM SUCCINATE 40 MG/ML
20 INJECTION, POWDER, LYOPHILIZED, FOR SOLUTION INTRAMUSCULAR; INTRAVENOUS DAILY
Status: COMPLETED | OUTPATIENT
Start: 2024-04-06 | End: 2024-04-06

## 2024-04-05 RX ORDER — ENOXAPARIN SODIUM 100 MG/ML
40 INJECTION SUBCUTANEOUS
Status: DISCONTINUED | OUTPATIENT
Start: 2024-04-05 | End: 2024-04-06

## 2024-04-05 RX ORDER — MAGNESIUM SULFATE 1 G/100ML
1 INJECTION INTRAVENOUS ONCE
Status: COMPLETED | OUTPATIENT
Start: 2024-04-05 | End: 2024-04-06

## 2024-04-05 RX ADMIN — MINOCYCLINE HYDROCHLORIDE 200 MG: 50 TABLET, FILM COATED ORAL at 20:32

## 2024-04-05 RX ADMIN — ENOXAPARIN SODIUM 40 MG: 40 INJECTION SUBCUTANEOUS at 08:36

## 2024-04-05 RX ADMIN — PIPERACILLIN SODIUM AND TAZOBACTAM SODIUM 4.5 G: 36; 4.5 INJECTION, POWDER, LYOPHILIZED, FOR SOLUTION INTRAVENOUS at 20:23

## 2024-04-05 RX ADMIN — PIPERACILLIN SODIUM AND TAZOBACTAM SODIUM 4.5 G: 36; 4.5 INJECTION, POWDER, LYOPHILIZED, FOR SOLUTION INTRAVENOUS at 10:43

## 2024-04-05 RX ADMIN — POLYETHYLENE GLYCOL 3350 17 G: 17 POWDER, FOR SOLUTION ORAL at 08:36

## 2024-04-05 RX ADMIN — NYSTATIN: 100000 POWDER TOPICAL at 20:32

## 2024-04-05 RX ADMIN — LACOSAMIDE 100 MG: 10 INJECTION, SOLUTION INTRAVENOUS at 08:36

## 2024-04-05 RX ADMIN — METHYLPREDNISOLONE SODIUM SUCCINATE 30 MG: 40 INJECTION, POWDER, FOR SOLUTION INTRAMUSCULAR; INTRAVENOUS at 08:37

## 2024-04-05 RX ADMIN — IOHEXOL 100 ML: 350 INJECTION, SOLUTION INTRAVENOUS at 10:22

## 2024-04-05 RX ADMIN — LEVALBUTEROL HYDROCHLORIDE 1.25 MG: 1.25 SOLUTION RESPIRATORY (INHALATION) at 07:34

## 2024-04-05 RX ADMIN — Medication 20000 ML: at 05:15

## 2024-04-05 RX ADMIN — PANTOPRAZOLE SODIUM 40 MG: 40 INJECTION, POWDER, FOR SOLUTION INTRAVENOUS at 20:41

## 2024-04-05 RX ADMIN — CALCIUM GLUCONATE 1 G: 20 INJECTION, SOLUTION INTRAVENOUS at 07:10

## 2024-04-05 RX ADMIN — MAGNESIUM SULFATE HEPTAHYDRATE 1 G: 1 INJECTION, SOLUTION INTRAVENOUS at 22:28

## 2024-04-05 RX ADMIN — SODIUM CHLORIDE SOLN NEBU 3% 4 ML: 3 NEBU SOLN at 12:53

## 2024-04-05 RX ADMIN — CHLORHEXIDINE GLUCONATE 0.12% ORAL RINSE 15 ML: 1.2 LIQUID ORAL at 08:36

## 2024-04-05 RX ADMIN — Medication 800 MG: at 10:30

## 2024-04-05 RX ADMIN — CHLORHEXIDINE GLUCONATE 0.12% ORAL RINSE 15 ML: 1.2 LIQUID ORAL at 20:41

## 2024-04-05 RX ADMIN — SODIUM CHLORIDE SOLN NEBU 3% 4 ML: 3 NEBU SOLN at 20:05

## 2024-04-05 RX ADMIN — SODIUM CHLORIDE SOLN NEBU 3% 4 ML: 3 NEBU SOLN at 07:34

## 2024-04-05 RX ADMIN — Medication 2 SPRAY: at 20:55

## 2024-04-05 RX ADMIN — LEVALBUTEROL HYDROCHLORIDE 1.25 MG: 1.25 SOLUTION RESPIRATORY (INHALATION) at 20:05

## 2024-04-05 RX ADMIN — NYSTATIN: 100000 POWDER TOPICAL at 08:35

## 2024-04-05 RX ADMIN — LEVALBUTEROL HYDROCHLORIDE 1.25 MG: 1.25 SOLUTION RESPIRATORY (INHALATION) at 12:53

## 2024-04-05 RX ADMIN — Medication 20000 ML: at 14:51

## 2024-04-05 RX ADMIN — LACOSAMIDE 100 MG: 10 INJECTION, SOLUTION INTRAVENOUS at 20:40

## 2024-04-05 RX ADMIN — PANTOPRAZOLE SODIUM 40 MG: 40 INJECTION, POWDER, FOR SOLUTION INTRAVENOUS at 08:36

## 2024-04-05 RX ADMIN — MINOCYCLINE HYDROCHLORIDE 200 MG: 50 TABLET, FILM COATED ORAL at 08:34

## 2024-04-05 RX ADMIN — Medication 2 SPRAY: at 08:35

## 2024-04-05 RX ADMIN — SULFAMETHOXAZOLE AND TRIMETHOPRIM 1 TABLET: 800; 160 TABLET ORAL at 08:35

## 2024-04-05 NOTE — ASSESSMENT & PLAN NOTE
"Code Status: full - Level 1  Ongoing medical optimization without limits at this time. Min has begun to question \"When enough is enough\". He is agreeable to current plan of 4-5 more days of medical management, awaiting any improvement in mental status with initiation of CRRT, investigating enlarging thyroid. However, he is open to revisit goals of care if no improvement by mid-week or if more complicating events arise sooner.   While Min remains hopeful for gradual improvement and stability to be d/c to rehab, he is also open to goals and appreciates honest information from treatment teams.  "

## 2024-04-05 NOTE — PROGRESS NOTES
Progress Note - Carla Hunt 59 y.o. female MRN: [id number]    Unit/Bed#: MICU 10 Encounter: [id number]      Assessment:  Carla Hunt is a 59-year-old medically and surgically complex female with B-cell lymphoma on right toxin, COVID/strep pneumonia, protracted hospital stay secondary to hypoxic respiratory failure, status post tracheostomy placement, cecal volvulus status post ileocectomy, mucous fistula and end ileostomy with poor wound healing due to steroids and neutropenia as well as upper GI bleed status post OR takeback 4/4 for midline wound exploration.  Patient was seen in urologic consultation for urinary retention status post Ortiz catheter insertion.  Our service was reconsulted to evaluate for gross hematuria against background of thrombocytopenia.  Hematuria was mild and intermittent.    Patient remains in the medical intensive care unit afebrile, normotensive, on mechanical vent via trach.  Hemoglobin 9.4 last 2 draws.  INR 1.6.  Patient on CRRT.  Low urine output.  Recent hepatic function shows elevated bilirubin.  Positive thrombocytopenia with platelet count between 52--57 K.  Ortiz in situ patent for dark concentrated urine, slight heme without clots--lysed blood.    Plan:  Continue medical stabilization.  Appreciate interdisciplinary management.  Maintain Ortiz catheter to straight drainage and continue manual irrigation aggressively around-the-clock.  Will reevaluate in the a.m. and determine need for CBI given patient has pre-existing three-way Ortiz.  Recent CT negative for organized clot or active bleeding.  Will attempt conservative management given multiple medical concerns and degree of bleeding is mild despite urine color.  At this interval, risk is greater than benefit of urgent cystoscopic examination.  Will follow along.    Subjective:   No distress.  Unable to maintain meaningful conversation.    Objective:     Vitals: Blood pressure 109/56, pulse (!) 118, temperature (!)  "97.3 °F (36.3 °C), resp. rate 16, height 5' 8\" (1.727 m), weight 78.1 kg (172 lb 2.9 oz), SpO2 100%.,Body mass index is 26.18 kg/m².      Intake/Output Summary (Last 24 hours) at 4/5/2024 1343  Last data filed at 4/5/2024 0700  Gross per 24 hour   Intake 773.58 ml   Output 1384 ml   Net -610.42 ml       Physical Exam: General appearance: syndromic appearance - critically ill  Head: Normocephalic, without obvious abnormality, atraumatic  Neck: no JVD and supple, symmetrical, trachea midline  Lungs: diminished breath sounds and tracheostomy--MV  Heart: regular rate and rhythm, S1, S2 normal, no murmur, click, rub or gallop  Abdomen: Ostomy  Extremities: extremities normal, warm and well-perfused; no cyanosis, clubbing, or edema  Pulses: 2+ and symmetric  Neurologic: Mental status: alertness: obtunded  Ortiz--see below            Invasive Devices       Peripheral Intravenous Line  Duration             Peripheral IV 04/01/24 Right;Ventral (anterior) Forearm 3 days    Peripheral IV 04/02/24 Right;Upper;Ventral (anterior);Medial Arm 2 days    Peripheral IV 04/03/24 Left;Ventral (anterior) Forearm 2 days              Hemodialysis Catheter  Duration             HD Temporary Double Catheter <1 day              Drain  Duration             Ileostomy RUQ 44 days    Urethral Catheter Three way 18 Fr. 4 days    NG/OG/Enteral Tube Nasogastric 18 Fr Right nare 1 day              Airway  Duration             Surgical Airway Shiley Cuffed 24 days                  Lab Results   Component Value Date    WBC 13.25 (H) 04/05/2024    HGB 9.4 (L) 04/05/2024    HGB 9.4 (L) 04/05/2024    HCT 29.1 (L) 04/05/2024    HCT 29.0 (L) 04/05/2024    MCV 91 04/05/2024    PLT 57 (L) 04/05/2024     Lab Results   Component Value Date    SODIUM 140 04/05/2024    K 4.4 04/05/2024     (H) 04/05/2024    CO2 20 (L) 04/05/2024    BUN 65 (H) 04/05/2024    CREATININE 0.88 04/05/2024    GLUC 98 04/05/2024    CALCIUM 10.0 04/05/2024         Lab, Imaging and " other studies: I have personally reviewed pertinent reports.

## 2024-04-05 NOTE — OP NOTE
OPERATIVE REPORT  PATIENT NAME: Carla Hunt    :  1965  MRN: [id number]  Pt Location: BE OR ROOM 04    SURGERY DATE: 2024    Surgeons and Role:     * Andrzej Crowley DO - Primary     * Lawson Valladares MD - Assisting    Preop Diagnosis:  Abdominal wall cellulitis [L03.311]    Post-Op Diagnosis Codes:     * Abdominal wall cellulitis [L03.311]    Procedure(s):  DEBRIDEMENT WOUND (WASH OUT). abdominal wound    Specimen(s):  * No specimens in log *    Estimated Blood Loss:   Minimal    Drains:  NG/OG/Enteral Tube Nasogastric 18 Fr Right nare (Active)   Placement Reverification Other (Comment) 24 1600   Site Assessment Clean;Dry;Intact 24 0800   Marking at Nare (cm) - For ongoing assessment of tube placement 55 cm 24 0800   Enteral feeding tube interventions Flushed 24 0800   Status Irrigated 24 0800   Number of days: 1       Ileostomy RUQ (Active)   Stomal Appliance 1 piece 24 0800   Stoma Assessment WILL 24 0800   Stoma Shape Recessed 24 08   Peristomal Assessment WILL 24 0800   Treatment Bag change 24 0800   Output (mL) 200 mL 24 0401   Number of days: 44       Urethral Catheter Three way 18 Fr. (Active)   Reasons to continue Urinary Catheter  Acute urinary retention/obstruction failing urinary retention protocol 24 0800   Goal for Removal Remove after 48 hrs of I/O monitoring 24 0800   Site Assessment Clean 24 0800   Ortiz Care Done 24 0900   Collection Container Standard drainage bag 24 0800   Securement Method Securing device (Describe) 24 0800   Irrigant Normal saline 24 1207   Urethral Irrigation Intake (mL) 60 mL 24 0606   Output (mL) 250 mL 24 1200   Number of days: 4       Continuous Bladder Irrigation Three-way (Active)   Site Assessment Clean;Skin intact 24 0800   Securement Method Securing device (Describe) 24 0800   Rate Slow 24 0400    Irrigant Normal saline 04/03/24 0400   CBI Irrigation Intake (mL) 800 mL 04/03/24 1800   CBI Ortiz Output (mL) 1400 mL 04/03/24 1800   CBI Net Output (mL) 600 mL 04/03/24 1800   Ortiz Output Appearance Blood clots;Sediment;Red flecks;Clear 04/03/24 0800   Number of days: 4       Anesthesia Type:   Choice    Operative Indications:  Abdominal wall cellulitis [L03.311]    Operative Findings:  Significant fecal contamination of midline wound  Small area of exposed bowel in the upper left area of the midline wound -- bowel was socked in to surrounding fascia with no obvious tunneling into peritoneal cavity, however  Skin/subcutaneous bridge between retracted ostomy/mucus fistula site and midline wound was resected, as was umbilical stalk due to tunneling with resultant fecal contamination of surrounding tissue  Final wound measurements: 12.5 cm x 12 cm x 3 cm  Wound care:  Exposed bowel was covered with adaptic x1  Stoma and mucus fistula were isolated using coloplast and plastic nipple  Saline-moistened kerlix gauze was packed over remainder of wound base, then covered with ostobarrier  Wound manager was cut to size of plastic nipple and applied atop entire wound over ostobarrier      Complications:   None    Procedure and Technique:  After informed consent was obtained explaining the risks/benefits/alternatives of the procedure, the patient was taken to the OR. Sequential compression devices were placed. General anesthesia was induced and the patient was prepped and draped in the usual sterile fashion. A time out was performed to verify correct site and procedure; all present were in agreement.    The wound was cleaned of remaining fecal contamination and thoroughly irrigated. The skin/subcutaneous bridge between the ostomy site and midline wound was excised due to tunneling underneath the subcutaneous tissue. There was tunneling also noted around the umbilical stalk, which was therefore amputated and a small amount  of subcutaneous tissue around the umbilicus was resected so as not to cover the stoma. The tissue itself was healthy and bleeding. There was no necrotic tissue noted. There was a small area of exposed bowel in the upper left portion of the midline wound; however, this was fairly socked in and no obvious connection with the peritoneal cavity was noted, although we did not vigorously probe the area due to risk of inadvertently creating a connection with the abdominal cavity.    Hemostasis was achieved with Bovie electrocautery. The wound was again copiously irrigated and suctioned dry. The stoma and mucus fistula (which essentially are now enteroatmospheric fistulae) were isolated using the plastic portion of a bottle nipple coated with coloplast. Adaptic x1 was placed atop the area of exposed bowel. Saline-moistened kerlix gauze was packed over the wound base and area the plastic nipple. Ostobarrier was applied atop the kerlix to provide a foundation upon which a wound manager could be placed. The wound manager was cut to the appropriate size and applied atop the ostobarrier over the entire wound and surrounding skin.    Counts were correct x2 at the conclusion of the procedure. RF wanding was negative for any retained sponges.     The patient was awakened and taken directly to the intensive care unit without any immediate post op complications.     Dr. Crowley was present for the entire procedure.       Patient Disposition:  Critical Care Unit and hemodynamically stable        SIGNATURE: Lawson Valladares MD  DATE: April 4, 2024  TIME: 9:39 PM      Method:  _X__ Excisional  ___ Non-excisional debridement  Instrument:  ___  Blade  _X_ Electrocautery  ___ Scissors  ___Lavage  ___Other, please specify:  Depth:  ___ Subcutaneous  _X_ Fascia  ___ Muscle  ___ Bone    Size of the wound: 12.5 cm x 12 cm x 3 cm  Wound description: healthy tissue with fecal contamination    Indicate if the procedure extended to the wound  margins? _X_ Yes  ___ No  Indicate if the procedure extended to bleeding tissue? _X_ Yes  ___ No

## 2024-04-05 NOTE — PROGRESS NOTES
"[facility]  Progress Note  Name: Carla Hunt I  MRN: [id number]  Unit/Bed#: MICU 10 I Date of Admission: 1/10/2024   Date of Service: 4/5/2024 I Hospital Day: 86    Assessment/Plan   Goals of care, counseling/discussion  Assessment & Plan  Code Status: full - Level 1  Ongoing medical optimization without limits at this time. Min has begun to question \"When enough is enough\". He is agreeable to current plan of 4-5 more days of medical management, awaiting any improvement in mental status with initiation of CRRT, investigating enlarging thyroid. However, he is open to revisit goals of care if no improvement by mid-week or if more complicating events arise sooner.   While Min remains hopeful for gradual improvement and stability to be d/c to rehab, he is also open to goals and appreciates honest information from treatment teams.    Palliative care patient  Assessment & Plan  Time spent providing supportive listening to spouse Min at bedside. Extended offer of support to patient's children.  Min continues to be consistently present and invested in patient's care.   Also supported by her daughter, son, and robust  group of friends, brother, and natalia community  Decisional apparatus:  Patient is not competent on my exam today.  If competence is lost, patient's substitute decision maker would default to spouse Min by [place] 169.  Advance Directive / Living Will / POLST:  None on file, unable to complete  Palliative care MSW continues to follow    Teto marginal zone B-cell lymphoma (HCC)  Assessment & Plan  Previously on maintenance Rituxan therapy. Last treatment in December 2023    Encephalopathy  Assessment & Plan  Multi-specialty team treating individual factors that may contribute towards AMS. Provigil held at this time.  Identified to have fungemia, treatment started. CRRT initiated on 4/4, awaiting any improvement in mental status. If not, primary " considering MRI  Unfortunately, Carla remains encephalopathic, not interactive during time of encounter.    * Acute respiratory failure with hypoxia (HCC)  Assessment & Plan  Admitted, severe COVID course complicated by incidences of pneumonia  Patient was re-intubated 3/11 with subsequent bedside trach on 3/12.   Very careful steroid wean  per critical care team.   Patient back on ventilator support, previously on trach collar and using PMV at her best           Interval history:       CRRT was initiated yesterday afternoon followed by OR for washout. Underwent debridement of nonviable subcutaneous tissue. Remains drowsy, unresponsive. Primary team exploring enlarged thyroid with possible plan for biopsy. Await any improvement in mental status with initiation of CRRT. Hopeful for slow stepwise improvement, but if not Min is agreeable to revisit GOC next week.    MEDICATIONS / ALLERGIES:     all current active meds have been reviewed    No Known Allergies    OBJECTIVE:    Physical Exam  Physical Exam  Constitutional:       General: She is not in acute distress.     Appearance: She is ill-appearing.   Cardiovascular:      Rate and Rhythm: Normal rate.   Pulmonary:      Comments: Ventilated via tracheostomy   Abdominal:      Comments: Visualized via surgery documentation. Midline abdominal wound s/p debridement and ostomy. Wound manager in place   Genitourinary:     Comments: Ortiz in place, hematuria  Musculoskeletal:         General: Swelling present.   Skin:     General: Skin is warm and dry.   Neurological:      Comments: Unresponsive during time of encounter   Psychiatric:      Comments: calm         Lab Results:   Results from last 7 days   Lab Units 04/05/24  0522 04/04/24  2312 04/04/24  1802   WBC Thousand/uL 13.25* 11.03* 11.56*   HEMOGLOBIN g/dL 9.4*  9.4* 6.5* 7.4*   HEMATOCRIT % 29.1*  29.0* 19.7* 23.0*   PLATELETS Thousands/uL 57* 52* 57*   MONO PCT % 1* 5 1*   EOS PCT % 0 0 0     Results from last  7 days   Lab Units 04/05/24  1158 04/05/24  0522 04/04/24  2312 04/04/24  1802 04/04/24  0752 04/04/24  0118 04/03/24  0430   POTASSIUM mmol/L 4.4 4.6 4.4   < >  --    < > 3.8  3.8   CHLORIDE mmol/L 109* 108 115*   < >  --    < > 109*  109*   CO2 mmol/L 20* 21 20*   < >  --    < > 19*  19*   BUN mg/dL 65* 76* 102*   < >  --    < > 103*  103*   CREATININE mg/dL 0.88 1.07 1.45*   < >  --    < > 1.48*  1.48*   CALCIUM mg/dL 10.0 10.0 9.4   < >  --    < > 10.0  10.0   ALK PHOS U/L  --  346*  --   --  381*  --  210*   ALT U/L  --  32  --   --  34  --  29   AST U/L  --  20  --   --  21  --  16    < > = values in this interval not displayed.       Imaging Studies: reviewed pertinent studies   EKG, Pathology, and Other Studies: reviewed pertinent studies    Counseling / Coordination of Care    Total floor / unit time spent today 35 minutes. Greater than 50% of total time was spent with the patient and / or family counseling and / or coordination of care. A description of the counseling / coordination of care: time spent assessing patient, providing support to spouse, discussing goals of care, communication with primary team and RN.

## 2024-04-05 NOTE — UTILIZATION REVIEW
"Continued Stay Review    Date: 04/04                          Current Patient Class: IP  Current Level of Care: Level 2 stepdown/HOT    HPI:59 y.o. female initially admitted on 01/10     Assessment/Plan: Overnight, more stool output noted within abd wound, gas, distended abd. BCX x2 obtained, lactate obtained. INR 1.92 today. Remains on MV w/ trach. Cont current vent setting. Give FFP today. Check mixing study. Given iv vitamin K. Plan for OR today for abdominal washout. D5W for 4 hours for hypernatremia. Fentanyl PRN. Discussed with nephro given persistent uremia, will start CRRT. RT IJ temp dialysis cath placed. Continue zosyn, minocycline, PCP prophylaxis with bactrim. Continue micafungin and fluconazole. Keep platelets >50K. F/up repeat blood cultures. Continue steroid wean, last day tomorrow (20mg today, 10mg tomorrow). Ont Insulin gtt.       Vital Signs: /65   Pulse (!) 110   Temp 97.7 °F (36.5 °C)   Resp 17   Ht 5' 8\" (1.727 m)   Wt 78.1 kg (172 lb 2.9 oz)   SpO2 98%   BMI 26.18 kg/m²       Pertinent Labs/Diagnostic Results:   04/04  CXR:  1. Stable appearance of right lung infiltrate, now with developing hazy airspace opacities in the left lung  2. Nasogastric tube tip in the gastric fundus and sideport at the esophagogastric junction. May consider advancement to assure sideport is below the EG junction.    VAS upper limb venous duplex scan:  RIGHT UPPER LIMB LIMITED:  Unable to evaluate right neck vessels due to positioning and medical straps.     LEFT UPPER LIMB:  No evidence of acute or chronic deep vein thrombosis. Innominate vein not  visualized due to straps.  There is evidence of acute occlusive superficial thrombophlebitis in the  cephalic vein from the elbow to proximal upper arm.  Doppler evaluation shows a normal response to augmentation maneuvers.     CXR 2:  1. Interval placement of right IJ catheter tip overlying the lower SVC. Other tubes/lines stable.     2. Similar appearance of " moderate diffuse bilateral infectious/inflammatory airspace opacities.        Results from last 7 days   Lab Units 04/04/24  1802 04/04/24  0752 04/04/24  0118 04/04/24  0015 04/03/24 2029   WBC Thousand/uL 11.56* 12.73* 11.43* 10.66* 10.44*   HEMOGLOBIN g/dL 7.4* 7.9* 8.0* 7.8* 7.8*   HEMATOCRIT % 23.0* 23.4* 24.7* 23.5* 23.5*   PLATELETS Thousands/uL 57* 68* 70* 58* 65*   BANDS PCT % 14* 13* 10*  --   --          Results from last 7 days   Lab Units 04/04/24  1802 04/04/24  0425 04/04/24  0118 04/03/24  0430 04/02/24  0802 04/01/24  0430   SODIUM mmol/L 146 148* 146 145  145 141 143   POTASSIUM mmol/L 3.4* 3.8 3.9 3.8  3.8 4.4 3.6   CHLORIDE mmol/L 112* 113* 112* 109*  109* 110* 109*   CO2 mmol/L 21 19* 18* 19*  19* 20* 21   ANION GAP mmol/L 13 16* 16* 17*  17* 11 13   BUN mg/dL 109* 120* 116* 103*  103* 105* 104*   CREATININE mg/dL 1.56* 1.63* 1.65* 1.48*  1.48* 1.44* 1.24   EGFR ml/min/1.73sq m 36 34 33 38  38 40 47   CALCIUM mg/dL 9.7 10.1 10.3* 10.0  10.0 10.3* 10.6*   CALCIUM, IONIZED mmol/L 1.38*  --   --   --   --   --    MAGNESIUM mg/dL 2.4 2.5  --  2.5 2.4 2.5   PHOSPHORUS mg/dL 5.7* 6.2*  --  6.9* 6.0* 5.5*     Results from last 7 days   Lab Units 04/04/24  0752 04/03/24  1330 04/03/24  0430 03/30/24  0454   AST U/L 21  --  16 12*   ALT U/L 34  --  29 45   ALK PHOS U/L 381*  --  210* 196*   TOTAL PROTEIN g/dL 4.7*  --  5.1* 4.9*   ALBUMIN g/dL 2.2*  --  2.4* 2.1*   TOTAL BILIRUBIN mg/dL 1.46*  --  1.22* 0.68   BILIRUBIN DIRECT mg/dL 0.96*  --   --   --    AMMONIA umol/L  --  28  --   --      Results from last 7 days   Lab Units 04/04/24  1801 04/04/24  1604 04/04/24  1426 04/04/24  1209 04/04/24  1008 04/04/24  0826 04/04/24  0605 04/04/24  0428 04/04/24  0150 04/04/24  0017 04/03/24  2211 04/03/24  2027   POC GLUCOSE mg/dl 114 174* 180* 161* 165* 132 129 94 171* 179* 193* 158*     Results from last 7 days   Lab Units 04/04/24  1802 04/04/24  0425 04/04/24  0118 04/03/24  0430 04/02/24  0802  04/01/24  0430 03/31/24  0545 03/30/24  0454 03/29/24  1752 03/29/24  0556   GLUCOSE RANDOM mg/dL 117 107 185* 142*  142* 125 131 105 147* 134 257*             Beta- Hydroxybutyrate   Date Value Ref Range Status   04/03/2024 1.10 (H) 0.02 - 0.27 mmol/L Final          Results from last 7 days   Lab Units 04/04/24  0647 04/04/24  0129 04/01/24  0430   PH NICA  7.284* 7.285* 7.249*   PCO2 NICA mm Hg 38.5* 37.1* 46.6   PO2 NICA mm Hg 64.3* 87.6* 47.6*   HCO3 NICA mmol/L 17.8* 17.2* 19.9*   BASE EXC NICA mmol/L -8.1 -8.7 -6.9   O2 CONTENT NICA ml/dL 10.2 10.8 9.7   O2 HGB, VENOUS % 90.3* 94.7* 78.6         Results from last 7 days   Lab Units 04/03/24  0430 03/29/24  1752   CK TOTAL U/L 24* 15*             Results from last 7 days   Lab Units 04/04/24  0425 04/04/24  0118 04/03/24  0430 04/01/24  1553   PROTIME seconds 22.3* 21.6* 22.0* 20.1*   INR  2.00* 1.92* 1.96* 1.75*   PTT seconds  --  40*  --  35     Results from last 7 days   Lab Units 04/01/24  1431   TSH 3RD GENERATON uIU/mL 0.039*     Results from last 7 days   Lab Units 04/01/24  1431 03/31/24  0545   PROCALCITONIN ng/ml 7.19* 6.28*     Results from last 7 days   Lab Units 04/04/24  1001 04/04/24  0118 04/03/24  0805   LACTIC ACID mmol/L 1.1 1.7 1.0                         Results from last 7 days   Lab Units 04/04/24  1103 04/04/24  0931 04/04/24  0555 04/03/24  0555 04/02/24  0555 04/01/24  0555 03/31/24  0555   UNIT PRODUCT CODE  [id number] [id number]  Z1282V47 B0258V86  B5793Y16  D4221G04 [id number] [id number]   UNIT NUMBER  [id number]  [id number]  [id number]  [id number]   UNITABO  A A A O  A O  A  O A A   UNITRH  NEG POS NEG POS  POS POS  NEG  POS NEG POS   CROSSMATCH  Compatible  --   --   --  Compatible Compatible  --    UNIT DISPENSE STATUS  Issued Issued Presumed Trans Presumed Trans  Presumed Trans Presumed Trans   Presumed Trans  Presumed Trans Presumed Trans Presumed Trans   UNIT PRODUCT VOL mL 350 250 250 268  280 253  350  300 350 300             Results from last 7 days   Lab Units 03/31/24  0545   CRP mg/L 331.2*             Results from last 7 days   Lab Units 04/04/24  0129 04/03/24  1416 03/29/24  1755   CLARITY UA  Turbid Turbid Turbid   COLOR UA  Yellow Yellow Light Orange   SPEC GRAV UA  1.017 1.017 1.009   PH UA  6.0 5.5 6.0   GLUCOSE UA mg/dl Negative Negative Negative   KETONES UA mg/dl Negative Negative Negative   BLOOD UA  Large* Large* Large*   PROTEIN UA mg/dl 70 (1+)* 100 (2+)* 50 (1+)*   NITRITE UA  Negative Negative Negative   BILIRUBIN UA  Negative Negative Negative   UROBILINOGEN UA (BE) mg/dl <2.0 <2.0 <2.0   LEUKOCYTES UA  Moderate* Large* Small*   WBC UA /hpf Innumerable* Innumerable* Innumerable*   RBC UA /hpf Innumerable* Innumerable* Innumerable*   BACTERIA UA /hpf None Seen Occasional None Seen   EPITHELIAL CELLS WET PREP /hpf None Seen None Seen Occasional   MUCUS THREADS   --   --  Occasional*   SODIUM UR   --  36  --    CREATININE UR mg/dL  --  15.8  --                                  Results from last 7 days   Lab Units 04/04/24  0120 03/31/24  1450 03/31/24  1437 03/31/24  1046   BLOOD CULTURE  Received in Microbiology Lab. Culture in Progress.  Received in Microbiology Lab. Culture in Progress. Candida albicans* Candida albicans*  --    SPUTUM CULTURE   --   --   --  3+ Growth of Stenotrophomonas maltophilia*  2+ Growth of   GRAM STAIN RESULT   --  Budding yeast* Yeast* 4+ Gram negative rods*  No polys seen*             Results from last 7 days   Lab Units 04/02/24  0441   VANCOMYCIN RM ug/mL 38.3*       Medications:   Scheduled Medications:  chlorhexidine, 15 mL, Mouth/Throat, Q12H [facility]  [START ON 4/5/2024] fluconazole, 800 mg, Intravenous, Q24H  lacosamide, 100 mg, Intravenous, Q12H  levalbuterol, 1.25 mg, Nebulization, TID  methylPREDNISolone sodium succinate, 30 mg, Intravenous,  Daily  micafungin, 100 mg, Intravenous, Q24H  minocycline, 200 mg, Per NG Tube, Q12H  nystatin, , Topical, BID  pantoprazole, 40 mg, Intravenous, Q12H SARTHAK  piperacillin-tazobactam, 4.5 g, Intravenous, Q8H  polyethylene glycol, 17 g, Per NG Tube, Daily  potassium chloride, 40 mEq, Intravenous, Once   And  potassium chloride, 20 mEq, Intravenous, Once  potassium chloride, 40 mEq, Intravenous, Q2H  sodium chloride, 2 spray, Each Nare, BID  sodium chloride, 4 mL, Nebulization, TID  [START ON 4/5/2024] sulfamethoxazole-trimethoprim, 1 tablet, Per NG Tube, Once per day on Monday Wednesday Friday      Continuous IV Infusions:  insulin regular (HumuLIN R,NovoLIN R) 1 Units/mL in sodium chloride 0.9 % 100 mL infusion, 0.3-21 Units/hr, Intravenous, Titrated  NxStage K 4/Ca 3, 20,000 mL, Dialysis, Continuous      PRN Meds:  acetaminophen, 650 mg, Oral, Q6H PRN  fentaNYL, 25 mcg, Intravenous, Q1H PRN  ondansetron, 4 mg, Intravenous, Q6H PRN  sodium chloride, 1 Application, Nasal, Q1H PRN        Discharge Plan: D    Network Utilization Review Department  ATTENTION: Please call with any questions or concerns to [phone] and carefully listen to the prompts so that you are directed to the right person. All voicemails are confidential.   For Discharge needs, contact Care Management DC Support Team at [phone] opt. 2  Send all requests for admission clinical reviews, approved or denied determinations and any other requests to dedicated fax number below belonging to the campus where the patient is receiving treatment. List of dedicated fax numbers for the Facilities:  FACILITY NAME UR FAX NUMBER   ADMISSION DENIALS (Administrative/Medical Necessity) [phone]   DISCHARGE SUPPORT TEAM (NETWORK) [phone]   PARENT CHILD HEALTH (Maternity/NICU/Pediatrics) [phone]   [facility] [phone]   [facility] [phone]   [facility]  [phone]   [facility] [phone]   [facility] [phone]   [facility] [phone]   [facility] [phone]   [facility] [phone]   [facility] [phone]   [facility] [phone]   [facility] [phone]   [facility] [phone]

## 2024-04-05 NOTE — UTILIZATION REVIEW
"Continued Stay Review    Date: 04/05                          Current Patient Class: IP  Current Level of Care: Level 2 stepdown/HOT    HPI:59 y.o. female initially admitted on 01/10     Assessment/Plan: Pt s/p OR wound debridement and washout yesterday. Intra-op findings notable for significant fecal contamination of midline wound. CT head/chest/abdomen/pelvis today showed improvement in PNA.  No intra-abdominal abscess. Start tube feeds. Continue CRRT for uremia, labs q6hrs. Monitor H/H, received another unit of PRBC for hgb 6.5 yesterday. F/up INR and give FFP if INR not improving. Micafungin discontinued, dose of fluconazole increased. Continue zosyn, minocycline and bactrim for PCP ppx. F/up gastric biopsy and HSV/candida. Decrease steroids to 20mg tomorrow and 10mg on Sunday. Off insulin drip now, monitor BG q6hrs. Consult urology for hematuria. Trend WBC and procal. F/up new blood cultures. Nephrology and ID following.      Vital Signs: /60   Pulse (!) 117   Temp (!) 96.3 °F (35.7 °C)   Resp 19   Ht 5' 8\" (1.727 m)   Wt 78.1 kg (172 lb 2.9 oz)   SpO2 99%   BMI 26.18 kg/m²       Pertinent Labs/Diagnostic Results:   04/05  CT CAP: Improved but persistent groundglass and consolidative bilateral pulmonary opacities.     No new intra-abdominal or intrapelvic findings.      CTH:  No acute intracranial abnormality status post left partial temporal lobectomy given limitations of beam-hardening streak artifact in posterior fossa.     Similar mild chronic microangiopathy.     Large bilateral mastoid and bilateral middle ear effusions, worsened on right side.     US Thyroid:  1. Limited exam due to overlying tracheostomy tube and central line catheter. Heterogeneous lobular appearance to the remaining thyroid gland, nonspecific, question sequela of thyroiditis. No discrete nodule identified.         Results from last 7 days   Lab Units 04/05/24  0522 04/04/24  2312 04/04/24  1802 04/04/24  0752 " 04/04/24  0118   WBC Thousand/uL 13.25* 11.03* 11.56* 12.73* 11.43*   HEMOGLOBIN g/dL 9.4*  9.4* 6.5* 7.4* 7.9* 8.0*   HEMATOCRIT % 29.1*  29.0* 19.7* 23.0* 23.4* 24.7*   PLATELETS Thousands/uL 57* 52* 57* 68* 70*   BANDS PCT % 15* 3 14* 13* 10*         Results from last 7 days   Lab Units 04/05/24  1158 04/05/24  0522 04/04/24  2312 04/04/24  1802 04/04/24  0425   SODIUM mmol/L 140 141 146 146 148*   POTASSIUM mmol/L 4.4 4.6 4.4 3.4* 3.8   CHLORIDE mmol/L 109* 108 115* 112* 113*   CO2 mmol/L 20* 21 20* 21 19*   ANION GAP mmol/L 11 12 11 13 16*   BUN mg/dL 65* 76* 102* 109* 120*   CREATININE mg/dL 0.88 1.07 1.45* 1.56* 1.63*   EGFR ml/min/1.73sq m 72 57 39 36 34   CALCIUM mg/dL 10.0 10.0 9.4 9.7 10.1   CALCIUM, IONIZED mmol/L 1.38* 1.41* 1.34* 1.38*  --    MAGNESIUM mg/dL 2.0 2.1 2.2 2.4 2.5   PHOSPHORUS mg/dL 4.6* 5.1* 5.8* 5.7* 6.2*     Results from last 7 days   Lab Units 04/05/24  0522 04/04/24  0752 04/03/24  1330 04/03/24  0430 03/30/24  0454   AST U/L 20 21  --  16 12*   ALT U/L 32 34  --  29 45   ALK PHOS U/L 346* 381*  --  210* 196*   TOTAL PROTEIN g/dL 5.2* 4.7*  --  5.1* 4.9*   ALBUMIN g/dL 2.4* 2.2*  --  2.4* 2.1*   TOTAL BILIRUBIN mg/dL 1.42* 1.46*  --  1.22* 0.68   BILIRUBIN DIRECT mg/dL 0.88* 0.96*  --   --   --    AMMONIA umol/L  --   --  28  --   --      Results from last 7 days   Lab Units 04/05/24  1402 04/05/24  0948 04/05/24  0808 04/05/24  0220 04/04/24  1801 04/04/24  1604 04/04/24  1426 04/04/24  1209 04/04/24  1008 04/04/24  0826 04/04/24  0605 04/04/24  0428   POC GLUCOSE mg/dl 81 85 83 78 114 174* 180* 161* 165* 132 129 94     Results from last 7 days   Lab Units 04/05/24  1158 04/05/24  0522 04/04/24  2312 04/04/24  1802 04/04/24  0425 04/04/24  0118 04/03/24  0430 04/02/24  0802 04/01/24  0430 03/31/24  0545 03/30/24  0454 03/29/24  1752   GLUCOSE RANDOM mg/dL 98 92 73 117 107 185* 142*  142* 125 131 105 147* 134             Beta- Hydroxybutyrate   Date Value Ref Range Status    04/03/2024 1.10 (H) 0.02 - 0.27 mmol/L Final          Results from last 7 days   Lab Units 04/04/24  0647 04/04/24  0129 04/01/24  0430   PH NICA  7.284* 7.285* 7.249*   PCO2 NICA mm Hg 38.5* 37.1* 46.6   PO2 NICA mm Hg 64.3* 87.6* 47.6*   HCO3 NICA mmol/L 17.8* 17.2* 19.9*   BASE EXC NICA mmol/L -8.1 -8.7 -6.9   O2 CONTENT NICA ml/dL 10.2 10.8 9.7   O2 HGB, VENOUS % 90.3* 94.7* 78.6         Results from last 7 days   Lab Units 04/05/24  1158 04/03/24  0430 03/29/24  1752   CK TOTAL U/L 25* 24* 15*             Results from last 7 days   Lab Units 04/05/24  1158 04/04/24  0425 04/04/24  0118 04/03/24  0430 04/01/24  1553   PROTIME seconds 19.1* 22.3* 21.6*   < > 20.1*   INR  1.63* 2.00* 1.92*   < > 1.75*   PTT seconds  --   --  40*  --  35    < > = values in this interval not displayed.     Results from last 7 days   Lab Units 04/01/24  1431   TSH 3RD GENERATON uIU/mL 0.039*     Results from last 7 days   Lab Units 04/01/24  1431 03/31/24  0545   PROCALCITONIN ng/ml 7.19* 6.28*     Results from last 7 days   Lab Units 04/04/24  2312 04/04/24  1001 04/04/24  0118 04/03/24  0805   LACTIC ACID mmol/L 0.7 1.1 1.7 1.0                         Results from last 7 days   Lab Units 04/05/24  0555 04/04/24  0555 04/03/24  0555 04/02/24  0555 04/01/24  0555 03/31/24  0555   UNIT PRODUCT CODE  [id number]  [id number]  [id number] Z5112X67 [id number]  D0498T38 E3039S12  T7974C12  I8019T50 Z6394T59 W1045S80   UNIT NUMBER  [id number]  [id number]  [id number]  [id number]  [id number]  [id number]   UNITABO  A  A  A A O  A O  A  O A A   UNITRH  NEG  POS  NEG NEG POS  POS POS  NEG  POS NEG POS   CROSSMATCH  Compatible  Compatible  --   --  Compatible Compatible  --    UNIT DISPENSE STATUS  Presumed Trans  Presumed Trans  Presumed Trans Presumed Trans Presumed Trans  Presumed Trans Presumed Trans  Presumed Trans   Presumed Trans Presumed Trans Presumed Trans   UNIT PRODUCT VOL mL 350  250  350 250 268  280 253  350  300 350 300             Results from last 7 days   Lab Units 03/31/24  0545   CRP mg/L 331.2*             Results from last 7 days   Lab Units 04/04/24  0129 04/03/24  1416 03/29/24  1755   CLARITY UA  Turbid Turbid Turbid   COLOR UA  Yellow Yellow Light Orange   SPEC GRAV UA  1.017 1.017 1.009   PH UA  6.0 5.5 6.0   GLUCOSE UA mg/dl Negative Negative Negative   KETONES UA mg/dl Negative Negative Negative   BLOOD UA  Large* Large* Large*   PROTEIN UA mg/dl 70 (1+)* 100 (2+)* 50 (1+)*   NITRITE UA  Negative Negative Negative   BILIRUBIN UA  Negative Negative Negative   UROBILINOGEN UA (BE) mg/dl <2.0 <2.0 <2.0   LEUKOCYTES UA  Moderate* Large* Small*   WBC UA /hpf Innumerable* Innumerable* Innumerable*   RBC UA /hpf Innumerable* Innumerable* Innumerable*   BACTERIA UA /hpf None Seen Occasional None Seen   EPITHELIAL CELLS WET PREP /hpf None Seen None Seen Occasional   MUCUS THREADS   --   --  Occasional*   SODIUM UR   --  36  --    CREATININE UR mg/dL  --  15.8  --              Results from last 7 days   Lab Units 04/04/24  0120 03/31/24  1450 03/31/24  1437 03/31/24  1046   BLOOD CULTURE  No Growth at 24 hrs.  Received in Microbiology Lab. Culture in Progress. Candida albicans* Candida albicans*  --    SPUTUM CULTURE   --   --   --  3+ Growth of Stenotrophomonas maltophilia*  2+ Growth of   GRAM STAIN RESULT   --  Budding yeast* Yeast* 4+ Gram negative rods*  No polys seen*             Results from last 7 days   Lab Units 04/02/24  0441   VANCOMYCIN RM ug/mL 38.3*       Medications:   Scheduled Medications:  chlorhexidine, 15 mL, Mouth/Throat, Q12H SARTHAK  enoxaparin, 40 mg, Subcutaneous, Q24H SARTHAK  fluconazole, 800 mg, Intravenous, Q24H  lacosamide, 100 mg, Intravenous, Q12H  levalbuterol, 1.25 mg, Nebulization, TID  [START ON 4/6/2024] methylPREDNISolone sodium succinate, 20 mg, Intravenous, Daily   Followed  by  [START ON 4/7/2024] methylPREDNISolone sodium succinate, 10 mg, Intravenous, Daily  minocycline, 200 mg, Per NG Tube, Q12H  nystatin, , Topical, BID  pantoprazole, 40 mg, Intravenous, Q12H SARTHAK  piperacillin-tazobactam, 4.5 g, Intravenous, Q8H  polyethylene glycol, 17 g, Per NG Tube, Daily  sodium chloride, 2 spray, Each Nare, BID  sodium chloride, 4 mL, Nebulization, TID  sulfamethoxazole-trimethoprim, 1 tablet, Per NG Tube, Once per day on Monday Wednesday Friday      Continuous IV Infusions:  insulin regular (HumuLIN R,NovoLIN R) 1 Units/mL in sodium chloride 0.9 % 100 mL infusion, 0.3-21 Units/hr, Intravenous, Titrated  NxStage K 4/Ca 3, 20,000 mL, Dialysis, Continuous      PRN Meds:  acetaminophen, 650 mg, Oral, Q6H PRN  fentaNYL, 25 mcg, Intravenous, Q1H PRN  ondansetron, 4 mg, Intravenous, Q6H PRN  sodium chloride, 1 Application, Nasal, Q1H PRN        Discharge Plan: D    Network Utilization Review Department  ATTENTION: Please call with any questions or concerns to [phone] and carefully listen to the prompts so that you are directed to the right person. All voicemails are confidential.   For Discharge needs, contact Care Management DC Support Team at [phone] opt. [phone]  Send all requests for admission clinical reviews, approved or denied determinations and any other requests to dedicated fax number below belonging to the campus where the patient is receiving treatment. List of dedicated fax numbers for the Facilities:  FACILITY NAME UR FAX NUMBER   ADMISSION DENIALS (Administrative/Medical Necessity) [phone]   DISCHARGE SUPPORT TEAM (NETWORK) [phone]   PARENT CHILD HEALTH (Maternity/NICU/Pediatrics) [phone]   [facility] [phone]   [facility] [phone]   [facility] [phone]   [facility] [phone]   Critical access hospital [phone]   [facility]  [facility] [phone]   [facility] [phone]   [facility] [phone]   [facility] [phone]   [facility] [phone]   [facility] [phone]   [facility] [place] [phone]

## 2024-04-05 NOTE — PROGRESS NOTES
Procedure Note - Nephrology   Carla Hunt 59 y.o. female MRN: [id number]  Unit/Bed#: MICU 10 Encounter: [id number]      Assessment / Plan:  ELIESER, recurrent, in the setting of concern for upper GI bleed.  Patient had prior ELIESER with component of ATN and Bactrim use  -Serum creatinine had improved down to 0.83, has steadily risen since March 29 up to 1.63 with rising BUN at 120.   -I suspect ATN/hypotension  -CRRT initiated 4/4/24 in the evening with UF as tolerated for volume management pre and post op. CRRT also begun for Elevated BUN with concerns for uremia in the setting of encephalopathy. As BUN can drop rapidly on HD, and as patient may not tolerate rapid fluid shifts, CRRT preferred over IHD despite lack of pressor needs. Rapid drop in BUN can lead to DDS.  -Patient seen and examined by me on CRRT today.  If patient's urine output improves, will discontinue CRRT as not on pressors.  -CT imaging shows persistent groundglass and consolidative bilateral pulmonary opacities  -dialysis consent on file  -monitor UOP closely  -remains on bactrim which can cause decreased sCr excretion/higher sCr levels. Had been switched to minocycline but back on bactrim  -b/l sCr 0.8-1.2  -did receive IV dye 4/1/24 which could contribute to ELIESER as well  -if CRRT filter goes down again, may turn off and consider transition to IHD tomorrow  hypernatremia - resolved on CRRT. Monitor BMP  Elevated anion gap acidosis likely d/t ELIESER - improved on CRRT  Azotemia with possible uremia - imoproving on CRRT as above.   Hypercalcemia - corrected calcium 11.2, last PTH 71.4, UPEP negative in the past, ? D/t immobility. Avoid IV calcium. Unimproved s/p IVF. Hypercalcemia can contribute to ELIESER from vasoconstrictive effect, correct on CRRT  Hyperphosphatemia in setting of ELIESER/decreased clearance -improving on CRRT  Anemia in setting of GIB and B cell lymphoma - Hgb improved to 9.4 s/p blood transfusion, GI follows, ulcers noted on EGD-no  "active bleeding  Gross hematuria - s/p CBI, urology recommends outpatient cystoscopy, urology reconsulted  Acute hypoxic respiratory failure status post tracheostomy 2024  Candida fungemia-on Diflucan and micafungin IV per primary team  Encephalopathy with SAH and history of seizure disorder - SAH resolved, per primary team  Cecal volvulus status post ex lap, ileocecectomy and end ileostomy in -s/p OR  for abdominal wall debridement        Subjective:   Patient seen and examined by me on CRRT at approximate 11:35 AM.  Blood pressure 119/57, UF goal -50 mL/h.  Patient is off pressors.  Patient remains on vent via trach at 40% FiO2.  updated at bedside.      Objective:     Vitals: Blood pressure 109/56, pulse (!) 118, temperature (!) 97.3 °F (36.3 °C), resp. rate 16, height 5' 8\" (1.727 m), weight 78.1 kg (172 lb 2.9 oz), SpO2 100%.,Body mass index is 26.18 kg/m².Temp (24hrs), Av.8 °F (36.6 °C), Min:96.3 °F (35.7 °C), Max:100 °F (37.8 °C)      Weight (last 2 days)       Date/Time Weight    24 0600 78.1 (172.18)    24 0600 77.3 (170.42)              Intake/Output Summary (Last 24 hours) at 2024 1250  Last data filed at 2024 0700  Gross per 24 hour   Intake 773.58 ml   Output 1384 ml   Net -610.42 ml     I/O last 24 hours:  In: 2131.5 [I.V.:1034.2; Blood:440; IV Piggyback:657.3]  Out: 1634 [Urine:750; Other:884]        Physical Exam:   Physical Exam  Vitals and nursing note reviewed.   Constitutional:       General: She is not in acute distress.     Appearance: She is well-developed. She is ill-appearing. She is not diaphoretic.   HENT:      Head: Normocephalic and atraumatic.      Mouth/Throat:      Pharynx: No oropharyngeal exudate.      Comments: ETT  Eyes:      General:         Right eye: No discharge.         Left eye: No discharge.   Neck:      Thyroid: No thyromegaly.      Comments: trach  Cardiovascular:      Rate and Rhythm: Regular rhythm. Tachycardia " present.      Heart sounds: No murmur heard.  Pulmonary:      Effort: Pulmonary effort is normal. No respiratory distress.      Breath sounds: Normal breath sounds. No wheezing.   Abdominal:      General: Bowel sounds are normal. There is no distension.      Palpations: Abdomen is soft.      Comments: RUQ ileostomy   Genitourinary:     Comments: +Ortiz  Musculoskeletal:         General: Swelling (anasarca) present.   Skin:     General: Skin is warm and dry.      Coloration: Skin is pale.      Findings: No rash.   Neurological:      Motor: No abnormal muscle tone.      Comments: On vent via Trach/sedated   Psychiatric:      Comments: Unable to assess as patient intubated/sedated         Invasive Devices       Peripheral Intravenous Line  Duration             Peripheral IV 04/01/24 Right;Ventral (anterior) Forearm 3 days    Peripheral IV 04/02/24 Right;Upper;Ventral (anterior);Medial Arm 2 days    Peripheral IV 04/03/24 Left;Ventral (anterior) Forearm 1 day              Hemodialysis Catheter  Duration             HD Temporary Double Catheter <1 day              Drain  Duration             Ileostomy RUQ 44 days    Urethral Catheter Three way 18 Fr. 4 days    NG/OG/Enteral Tube Nasogastric 18 Fr Right nare 1 day              Airway  Duration             Surgical Airway Shiley Cuffed 24 days                    Medications:    Scheduled Meds:  Current Facility-Administered Medications   Medication Dose Route Frequency Provider Last Rate    acetaminophen  650 mg Oral Q6H PRN Mahamed P Cardenas, DO      chlorhexidine  15 mL Mouth/Throat Q12H [facility] Mahamed P Cardenas, DO      enoxaparin  40 mg Subcutaneous Q24H [facility] Amdandre Lazcano, DO      fentaNYL  25 mcg Intravenous Q1H PRN Mahamed P Cardenas, DO      fluconazole  800 mg Intravenous Q24H Mahamed P Cardenas, DO      insulin regular (HumuLIN R,NovoLIN R) 1 Units/mL in sodium chloride 0.9 % 100 mL infusion  0.3-21 Units/hr Intravenous Titrated Ajit Oquendo MD Stopped (04/05/24 0000)    lacosamide   100 mg Intravenous Q12H Mahamed P Cardenas, DO      levalbuterol  1.25 mg Nebulization TID Mahamed P Cardenas, DO      [START ON 4/6/2024] methylPREDNISolone sodium succinate  20 mg Intravenous Daily Emilie Soyeon Kim, MD      Followed by    [START ON 4/7/2024] methylPREDNISolone sodium succinate  10 mg Intravenous Daily Emilie Soyeon Kim, MD      minocycline  200 mg Per NG Tube Q12H Mahamed P Cardenas, DO      NxStage K 4/Ca 3  20,000 mL Dialysis Continuous Kena K Horacio, DO      nystatin   Topical BID Mahamed P Cardenas, DO      ondansetron  4 mg Intravenous Q6H PRN Mahamed P Cardenas, DO      pantoprazole  40 mg Intravenous Q12H SARTHAK Mahamed P Cardenas, DO      piperacillin-tazobactam  4.5 g Intravenous Q8H Mahamed P Cardenas, DO 4.5 g (04/05/24 1043)    polyethylene glycol  17 g Per NG Tube Daily Mahamed P Cardenas, DO      sodium chloride  1 Application Nasal Q1H PRN Mahamed P Cardenas, DO      sodium chloride  2 spray Each Nare BID Mahamed P Cardenas, DO      sodium chloride  4 mL Nebulization TID Mahamed P Cardenas, DO      sulfamethoxazole-trimethoprim  1 tablet Per NG Tube Once per day on Monday Wednesday Friday Mahamed P Cardenas, DO         PRN Meds:.  acetaminophen    fentaNYL    ondansetron    sodium chloride    Continuous Infusions:insulin regular (HumuLIN R,NovoLIN R) 1 Units/mL in sodium chloride 0.9 % 100 mL infusion, 0.3-21 Units/hr, Last Rate: Stopped (04/05/24 0000)  NxStage K 4/Ca 3, 20,000 mL            LAB RESULTS:      Results from last 7 days   Lab Units 04/05/24  1158 04/05/24  0522 04/04/24  2312 04/04/24  1802 04/04/24  0752 04/04/24  0425 04/04/24  0118 04/04/24  0015 04/03/24  2029 04/03/24  0809 04/03/24  0430 04/02/24  2153 04/02/24  2007 04/02/24  0837 04/02/24  0802 03/31/24  0044 03/30/24  0454   WBC Thousand/uL  --  13.25* 11.03* 11.56* 12.73*  --  11.43* 10.66* 10.44* 8.97  --    < > 7.96   < >  --    < > 2.72*   HEMOGLOBIN g/dL  --  9.4*  9.4* 6.5* 7.4* 7.9*  --  8.0* 7.8* 7.8* 8.1*  --    < > 6.8*   < >  --    < > 7.6*    HEMATOCRIT %  --  29.1*  29.0* 19.7* 23.0* 23.4*  --  24.7* 23.5* 23.5* 24.2*  --    < > 20.4*   < >  --    < > 23.6*   PLATELETS Thousands/uL  --  57* 52* 57* 68*  --  70* 58* 65* 65*  --    < > 45*   < >  --    < > 21*   LYMPHO PCT %  --  1* 0* 0* 0*  --  1*  --   --  0*  --   --  1*  --   --    < > 2*   MONO PCT %  --  1* 5 1* 13*  --  2*  --   --  4  --   --  2*  --   --    < > 8   EOS PCT %  --  0 0 0 0  --  0  --   --  0  --   --  0  --   --    < > 0   POTASSIUM mmol/L 4.4 4.6 4.4 3.4*  --  3.8 3.9  --   --   --  3.8  3.8  --   --   --  4.4   < > 3.5   CHLORIDE mmol/L 109* 108 115* 112*  --  113* 112*  --   --   --  109*  109*  --   --   --  110*   < > 112*   CO2 mmol/L 20* 21 20* 21  --  19* 18*  --   --   --  19*  19*  --   --   --  20*   < > 18*   BUN mg/dL 65* 76* 102* 109*  --  120* 116*  --   --   --  103*  103*  --   --   --  105*   < > 61*   CREATININE mg/dL 0.88 1.07 1.45* 1.56*  --  1.63* 1.65*  --   --   --  1.48*  1.48*  --   --   --  1.44*   < > 0.70   CALCIUM mg/dL 10.0 10.0 9.4 9.7  --  10.1 10.3*  --   --   --  10.0  10.0  --   --   --  10.3*   < > 10.0   ALK PHOS U/L  --  346*  --   --  381*  --   --   --   --   --  210*  --   --   --   --   --  196*   ALT U/L  --  32  --   --  34  --   --   --   --   --  29  --   --   --   --   --  45   AST U/L  --  20  --   --  21  --   --   --   --   --  16  --   --   --   --   --  12*   MAGNESIUM mg/dL 2.0 2.1 2.2 2.4  --  2.5  --   --   --   --  2.5  --   --   --  2.4   < > 2.8*   PHOSPHORUS mg/dL 4.6* 5.1* 5.8* 5.7*  --  6.2*  --   --   --   --  6.9*  --   --   --  6.0*   < > 4.2    < > = values in this interval not displayed.       CUTURES:  Lab Results   Component Value Date    BLOODCX No Growth at 24 hrs. 04/04/2024    BLOODCX Received in Microbiology Lab. Culture in Progress. 04/04/2024    BLOODCX Josephine albicans (A) 03/31/2024    BLOODCX Josephine albicans (A) 03/31/2024    BLOODCX No Growth After 5 Days. 02/26/2024    BLOODCX No Growth  "After 5 Days. 02/26/2024    BLOODCX No Growth After 5 Days. 02/17/2024    BLOODCX No Growth After 5 Days. 02/17/2024    URINECX 60,000-69,000 cfu/ml Lactobacillus species (A) 01/07/2024    URINECX <10,000 cfu/ml 12/26/2023    URINECX 20,000-29,000 cfu/ml Escherichia coli (A) 10/30/2020                 Portions of the record may have been created with voice recognition software. Occasional wrong word or \"sound a like\" substitutions may have occurred due to the inherent limitations of voice recognition software. Read the chart carefully and recognize, using context, where substitutions have occurred.If you have any questions, please contact the dictating provider.    "

## 2024-04-05 NOTE — ANESTHESIA PREPROCEDURE EVALUATION
Procedure:  DEBRIDEMENT WOUND (WASH OUT), abdominal wound (Abdomen)    Relevant Problems   CARDIO   (+) Hypertensive disorder      ENDO   (+) Disorder of parathyroid gland (HCC)      GI/HEPATIC   (+) Cecal volvulus (HCC)      /RENAL   (+) Acute kidney injury (HCC)   (+) Nephrolithiasis   (+) Stage 3b chronic kidney disease (CKD) (HCC)      HEMATOLOGY   (+) Teto marginal zone B-cell lymphoma (HCC)   (+) Pancytopenia (HCC)      NEURO/PSYCH   (+) Anxiety state   (+) Grand mal status (HCC)   (+) Other specified anxiety disorders   (+) Reactive depression   (+) Seizure disorder (HCC)   (+) Subjective visual disturbance      PULMONARY   (+) Acute respiratory failure with hypoxia (HCC)        Physical Exam    Airway  Comment: Trach           Dental       Cardiovascular  Cardiovascular exam normal    Pulmonary  Pulmonary exam normal     Other Findings  post-pubertal.      Anesthesia Plan  ASA Score- 4     Anesthesia Type- general with ASA Monitors.         Additional Monitors:     Airway Plan:            Plan Factors-    Chart reviewed. EKG reviewed.  Existing labs reviewed. Patient summary reviewed.                  Induction- inhalational.    Postoperative Plan- Plan for postoperative opioid use.     Informed Consent- Anesthetic plan and risks discussed with spouse.  I personally reviewed this patient with the CRNA. Discussed and agreed on the Anesthesia Plan with the CRNA..

## 2024-04-05 NOTE — OCCUPATIONAL THERAPY NOTE
Occupational Therapy Cancel Note        Patient Name: Carla Hunt  Today's Date: 4/5/2024 04/05/24 1312   OT Last Visit   OT Visit Date 04/05/24   Note Type   Note Type Cancelled Session   Cancel Reasons Medical status       Pt is currently on CRRT and is not appropriate to participate in skilled OT services at this time. Will continue to follow on caseload and see when appropriate.    Kaila Michel MS, OTR/L

## 2024-04-05 NOTE — CASE MANAGEMENT
Case Management Assessment & Discharge Planning Note    Patient name Carla Hunt  Location MICU 10/MICU 10 MRN [id number]  : 1965 Date 2024       Current Admission Date: 1/10/2024  Current Admission Diagnosis:Acute respiratory failure with hypoxia ([facility])   Patient Active Problem List    Diagnosis Date Noted    Fungemia 2024    Pancytopenia ([facility]) 2024    Gross hematuria 2024    Drowsiness 2024    Urinary retention 2024    Acute kidney injury (HCC) 2024    Cecal volvulus ([facility]) 2024    Palliative care patient 2024    Goals of care, counseling/discussion 2024    Hypotension 2024    Seizure disorder ([facility]) 2024    Acute respiratory failure with hypoxia ([facility]) 2024    Transaminitis 2024    Teto marginal zone B-cell lymphoma ([facility]) 2024    COVID 2024    Stage 3b chronic kidney disease (CKD) ([facility]) 2024    Abnormal CT of the head 2024    Nephrolithiasis 2021    Encephalopathy 2020    Other specified anxiety disorders 2019    Reactive depression 2019    Hidradenitis suppurativa of left axilla 2019    Anxiety state 2018    Disorder of parathyroid gland ([facility]) 2018    Eczema 2018    Grand mal status ([facility]) 2018    Hypercalcemia 2018    Hypertensive disorder 2018    Open wound of hand except fingers 2018    Otitis externa 2018    Overweight 2018    Pain in face 2018    Skin sensation disturbance 2018    Subjective visual disturbance 2018    Long term current use of hormonal contraceptive 10/23/2018    Rosacea 2015      LOS (days): 86  Geometric Mean LOS (GMLOS) (days):   Days to GMLOS:     OBJECTIVE:    Risk of Unplanned Readmission Score: 31.76         Current admission status: Inpatient       Preferred Pharmacy:   [id number] [place] - [address]  [place] [address]  Phone: [phone] Fax: [phone]    [facility] - [place], [place] - [address]  [address] [facility] [address]  Phone: [phone] Fax: [phone]    Primary Care Provider: Tony Guevara MD    Primary Insurance: [employer]  Secondary Insurance: MEDICARE    ASSESSMENT:  Active Health Care Proxies    There are no active Health Care Proxies on file.                                                      Social Determinants of Health (SDOH)      Flowsheet Row Most Recent Value   Housing Stability    In the last 12 months, was there a time when you were not able to pay the mortgage or rent on time? N   In the last 12 months, how many places have you lived? 1   In the last 12 months, was there a time when you did not have a steady place to sleep or slept in a shelter (including now)? N   Transportation Needs    In the past 12 months, has lack of transportation kept you from medical appointments or from getting medications? no   In the past 12 months, has lack of transportation kept you from meetings, work, or from getting things needed for daily living? No   Food Insecurity    Within the past 12 months, you worried that your food would run out before you got the money to buy more. Never true   Within the past 12 months, the food you bought just didn't last and you didn't have money to get more. Never true   Utilities    In the past 12 months has the electric, gas, oil, or water company threatened to shut off services in your home? No            DISCHARGE DETAILS:    Discharge planning discussed with:: Chart reviewed. Pt continue to require ICU level of care. Pt is not medically stable at this time. NO WKND discharged anticipated. Pt chronic on trach. No discharged need identified at this time. [facility] following pt for ongoing GOC discussed. Cm aware family meeting scheduled for next week. Cm will continue to follow.

## 2024-04-05 NOTE — ANESTHESIA POSTPROCEDURE EVALUATION
Post-Op Assessment Note    CV Status:  Stable  Pain scale: WILL.    Pain management: adequate       Mental Status:  Unresponsive (obtunded)   Hydration Status:  Euvolemic   PONV Controlled:  Controlled   Airway Patency:  Patent  Airway: intubated (trach)     Post Op Vitals Reviewed: Yes    No anethesia notable event occurred.    Staff: CRNA   Comments: report given to ICU team              BP   104/69   Temp   98.1   Pulse  102   Resp   22   SpO2   97% vent

## 2024-04-05 NOTE — PROGRESS NOTES
Pastoral Care Progress Note    2024  Patient: Carla Hunt : 1965  Admission Date & Time: 1/10/2024 1941  MRN: [id number] [facility]: [id number]        Father Willow met with pt for a follow-up pastoral visit. Chaplains remain available.                24 0900   Clinical Encounter Type   Visited With Patient  (Father Willow)   Routine Visit Follow-up

## 2024-04-05 NOTE — PROGRESS NOTES
"Progress Note  Carla Hunt 59 y.o. female MRN: [id number]  Unit/Bed#: MICU 10 Encounter: [id number]    Assessment  59F with COVID/strep pna, B cell lymphoma on rituxumab, CKD; prolonged hospitalization 2/2 acute hypoxic respiratory failure s/p MICU bedside trach, hospitalization complicated by cecal volvulus s/p ileocectomy, mucus fistula, end ileostomy on 3/13, anticipated poor wound healing on prolonged high dose steroids and neutropenia; candidemia with noted UGI bleed s/p EGD with esophageal ulcerations per GI  S/p Or 4/4 for midline wound exploration with mucus fistula and end ileostomy viable appearing and above the fascia, noted nonviable subq tissue debrided and skin bridge between ostomy and midline incision removed, small area of exposed bowel protected and stoma isolated       Plan  Surgery team to continue wound checks  Continue PPI and appreciate GI recs given black stools and candidemia  Rest of care per ICU      Subjective/Objective   Patient seen and examined bedside.  On CRRT net -25      /56   Pulse (!) 118   Temp (!) 97.3 °F (36.3 °C)   Resp 16   Ht 5' 8\" (1.727 m)   Wt 78.1 kg (172 lb 2.9 oz)   SpO2 99%   BMI 26.18 kg/m²     Physical Exam:  General - frail, intubated / trach  CV - warm, regular rate  Pulm - no respiratory distress on vent  Abd - soft, nondistended, wound manager overlying ostomy with dark black stool      Recent Labs     04/03/24  0430 04/03/24  0809 04/04/24  0118 04/04/24  0425 04/04/24  0752 04/04/24  1802 04/04/24  2312 04/05/24  0522   WBC  --    < > 11.43*  --  12.73*   < > 11.03* 13.25*   HGB  --    < > 8.0*  --  7.9*   < > 6.5* 9.4*  9.4*   PLT  --    < > 70*  --  68*   < > 52* 57*   SODIUM 145  145  --  146 148*  --    < > 146 141   K 3.8  3.8  --  3.9 3.8  --    < > 4.4 4.6   *  109*  --  112* 113*  --    < > 115* 108   CO2 19*  19*  --  18* 19*  --    < > 20* 21   *  103*  --  116* 120*  --    < > 102* 76*   CREATININE 1.48*  " 1.48*  --  1.65* 1.63*  --    < > 1.45* 1.07   GLUC 142*  142*  --  185* 107  --    < > 73 92   CALCIUM 10.0  10.0  --  10.3* 10.1  --    < > 9.4 10.0   MG 2.5  --   --  2.5  --    < > 2.2 2.1   PHOS 6.9*  --   --  6.2*  --    < > 5.8* 5.1*   AST 16  --   --   --  21  --   --   --    ALT 29  --   --   --  34  --   --   --    ALKPHOS 210*  --   --   --  381*  --   --   --    TBILI 1.22*  --   --   --  1.46*  --   --   --    EGFR 38  38  --  33 34  --    < > 39 57   PTT  --   --  40*  --   --   --   --   --    INR 1.96*  --  1.92* 2.00*  --   --   --   --     < > = values in this interval not displayed.

## 2024-04-05 NOTE — PROGRESS NOTES
Progress Note - Infectious Disease   Carla Hunt 59 y.o. female MRN: [id number]  Unit/Bed#: MICU 10 Encounter: [id number]      Impression/Plan:  1. Acute hypoxic respiratory failure, likely multifactorial, with both COVID and bacterial pneumonia contributing.  Also consider persistent inflammation, fibrosis as seems quite steroid responsive in past.  Most recently extubated 3/1.  Patient with worsening respiratory status requiring intubation again 3/11.  Suspect ongoing hypoxia related to persistent COVID-pneumonia, superimposed bacterial infection, inflammatory component/lung fibrosis related to COVID, now with profound deconditioning and muscle weakness.  Status post bronchoscopy and trach 3/12 with excessive secretions seen from the lower lobes bilaterally.  BAL culture from 3/11 shows heavy growth of Stenotrophomonas maltophilia, Candida albicans.  PJP DFA negative.  Fungitell was positive but patient was on beta-lactam antibiotics and had received multiple blood transfusions.  She was clinically improving and monitor off antifungals. Serum cryptococcal Ag negative. Status post 10 days of vancomycin and cefepime, 14-day course of remdesivir/Paxlovid 3/15, 14-day course of antibiotics with minocycline for stenotrophomonas pneumonia. Status post repeat bronchoscopy 3/17 for airway clearance with continued thick secretions.   Histoplasma urine antigen negative.  Patient was clinically improving and weaned to trach collar but 3/30 had worsening lethargy, again placed on the ventilator 3/31.  Repeat CT chest showed new consolidation in the right upper lobe, CT head unchanged.  Antibiotics restarted.  Sputum culture is now growing stenotrophomonas maltophilia and mixed respiratory lauren.  Course also complicated by candidemia, now ileostomy retraction and fecal contamination of the midline wound.  Repeat galactomannan negative on 4/2.  Bandemia noted on labs which is likely due to steroid taper.               -continue Zosyn extended infusion dosing in setting of fecal contamination of wound              -Continue minocycline 200 mg twice daily.    -Tube feeds should be held for 1 hour prior to and 1 hour after minocycline administration  -Steroid wean per critical care  -Antifungals as below  -If no improvement in mental status with antimicrobials, surgery, CRRT, recommend MRI  -If patient again loses enteric access we will switch to IV Eravacycline and Flagyl  -Duration of therapy pending clinical course  -Additional surveillance blood cultures ordered for tomorrow     2. Candidemia.  2 sets of blood cultures collected 3/31 are growing Candida albicans.  The patient has numerous risk factors for candidemia including presence of midline, abdominal surgery, prolonged hospitalization in the ICU, multiple courses of broad-spectrum antibiotics, leukopenia/recent neutropenia.  Suspect ileostomy retraction with wound contamination is the source.  TTE no vegetations.  Status post ophthalmology evaluation, no evidence of endophthalmitis.  Bandemia noted on labs likely from steroid taper.              -Continue fluconazole, 800 mg daily due to CRRT              -Follow-up repeat blood cultures   -Additional surveillance cultures ordered for tomorrow              -Consideration for SATURNINO next week pending clinical course     3.  Sepsis, recurrent since admission.  Due to COVID-19 infection, recurrent pneumonias, cecal volvulus status post surgery and wound dehiscence, now with candidemia, fecal contamination of midline wound.  Current white count likely reactive to steroid taper plus recent procedure.  Repeat CT chest/abdomen/pelvis without new pathology.              -Antibiotics and antifungals as above  -Follow temperatures closely  -Recheck WBC in AM to monitor infection  -Supportive care as per the primary service  -Ongoing follow-up by surgery     3. Recurrent bacterial pneumonia.  The patient has completed multiple treatment  courses over the hospitalization. Prior BAL cultures with Pseudomonas and stenotrophomonas.  Unclear if pathogens or colonizers.  Status post 10 days levofloxacin.  CT C/A/P showed evolving changes likely all due to sequelae of COVID, infections.  Noted to have worsening hypoxia and purulent secretions on bronchoscopy 3/11.  BAL culture with heavy growth of Stenotrophomonas maltophilia, Candida.  Completed 14-day course of minocycline 3/27.  CT chest now shows new right upper lobe infiltrate and sputum culture is again growing Stenotrophomonas maltophilia.  Given recent worsening clinical decline and new infiltrate we will continue stenotrophomonas treatment for now              -Antibiotics as above              -Wean O2 as able     4. Severe COVID, present on admission.  Patient was treated with a 10-day course of remdesivir, dexamethasone and was given 1 dose of Tocilizumab on admission.  COVID antigen was negative prior to coming off isolation.  Had positive COVID PCR from BAL 2/16, status post another 5-day course of remdesivir.  Patient has remained on high-dose systemic corticosteroid throughout her hospitalization.  COVID antigen positive and PCR is also positive 3/3 and Ct 26.8, consistent with high viral replication.  Repeat PCR positive with Ct closer to 30. Status post 14-day course of remdesivir/Paxlovid 3/15/2024.  Rapid COVID antigen negative 3/25 and 3/27  -Steroid wean per ICU  -No additional antivirals indicated  - Bactrim for PJP prophylaxis     5. 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  Patient is status post exploratory laparotomy with ileocecectomy and end ileostomy creation 2/20.   End ileostomy is with ongoing ischemic changes which is likely contributing to the imaging findings and ongoing SIRS response.  Now with wound dehiscence status post staple removal, status post wound VAC placement 3/13, removed but replaced due to bleeding.  Now with retraction of ileostomy and  fecal contamination through midline wound.  Return to the OR on 4/4 with wound debridement without extensive peritoneal contamination.  CT imaging follow-up without significant abdominal pathology.  -Serial exams  -Surgery follow-up              -Continue on Zosyn for now              -Anticipate 5 to 7 days of treatment post OR intervention     6. B-cell lymphoma, on maintenance rituxan, which is currently postponed.  Rituximab is a risk factor for persistent COVID infection.     7.  ELIESER.  Likely multifactorial due to prerenal status, medications.  With worsening BUN and creatinine.  Concern GI bleed make a contributing to high BUN.  Would also consider if this is attributing to worsening mental status. Patient being started on CRRT              -Monitor creatinine closely              -Dose adjust antibiotics for CRRT              -Nephrology following     8.  Anemia, thrombocytopenia, lymphopenia/neutropenia.  Patient has had persistent lymphopenia throughout this admission, likely due to rituximab, viral infection, high-dose steroids.  Now with worsening anemia, neutropenia, and thrombocytopenia.  Bactrim discontinued 3/18. CMV PCR negative.  Not a likely side effect of minocycline.  Immunoglobulins are low.  Started on Granix 3/27, white blood cell count now improved.  May be seen in IRIS type response with multiple underlying infections.  Current bandemia likely from recent G-CSF and weaning steroids.              -Steroid wean per primary              -Transfusion support per primary              -Hematology follow-up              -Monitor CBC     9.  GI bleed, ulcerations.  Status post EGD which showed erosive gastritis, esophageal tear with oozing, blood and extensive clot in the esophagus and stomach, small ulcers and trauma throughout the stomach.  Status post repeat EGD with ulcerated mucosa noted.              -Follow-up pathology, viral studies              -Hold on initiation of empiric antivirals      Above plan discussed in detail with primary service resident who is aware of plans for ongoing antibiotic and antifungal and plan for repeat cultures.    ID consult service will reevaluate again over the weekend as needed.  Please call if questions in the interim.     Antibiotics:  Fluconazole 4  Minocycline 3  Zosyn 3  Bactrim prophylaxis ongoing    24 Hour events:  Yesterday and overnight notes reviewed and patient was taken to the OR with surgery.  She was found to have fecal contamination of her midline wound, area of exposed bowel that have retracted back into the fascia without any tunneling into the cavity.  The area was resected and essentially the 2 wounds combined into 1 and area was packed and covered with barrier and wound manager.    Subjective:  Intubated via trach and does not interact on exam.  Currently on CRRT via temporary line.   present at bedside.    Objective:  Vitals:  Temp:  [96.3 °F (35.7 °C)-100 °F (37.8 °C)] 97.3 °F (36.3 °C)  HR:  [102-128] 118  Resp:  [16-27] 16  BP: ()/(55-75) 109/56  SpO2:  [97 %-100 %] 100 %  Temp (24hrs), Av.3 °F (36.3 °C), Min:96.3 °F (35.7 °C), Max:100 °F (37.8 °C)  Current: Temperature: (!) 97.3 °F (36.3 °C)    Physical Exam:   General Appearance:  Intubated via trach and does not interact on exam   Throat: Oropharynx moist without lesions.    Lungs:   Vented breath sounds throughout, no wheezes or rales   Heart:  RRR; no murmur, rub or gallop noted   Abdomen:   Soft, midline wound manager and stoma site noted.   Extremities: No clubbing, cyanosis; edema in all of her extremities   Skin: No new rashes or lesions. No new draining wounds noted.       Labs, Imaging, & Other studies:   All pertinent labs and imaging studies in PACS were personally reviewed as below.  Results from last 7 days   Lab Units 24  0522 24  2312 24  1802   WBC Thousand/uL 13.25* 11.03* 11.56*   HEMOGLOBIN g/dL 9.4*  9.4* 6.5* 7.4*   PLATELETS  Thousands/uL 57* 52* 57*     Results from last 7 days   Lab Units 04/05/24  1158 04/05/24  0522 04/04/24  1802 04/04/24  0752 04/04/24  0118 04/03/24  0430   POTASSIUM mmol/L 4.4 4.6   < >  --    < > 3.8  3.8   CHLORIDE mmol/L 109* 108   < >  --    < > 109*  109*   CO2 mmol/L 20* 21   < >  --    < > 19*  19*   BUN mg/dL 65* 76*   < >  --    < > 103*  103*   CREATININE mg/dL 0.88 1.07   < >  --    < > 1.48*  1.48*   EGFR ml/min/1.73sq m 72 57   < >  --    < > 38  38   CALCIUM mg/dL 10.0 10.0   < >  --    < > 10.0  10.0   AST U/L  --  20  --  21  --  16   ALT U/L  --  32  --  34  --  29   ALK PHOS U/L  --  346*  --  381*  --  210*    < > = values in this interval not displayed.     Results from last 7 days   Lab Units 04/04/24  0120 03/31/24  1450 03/31/24  1437 03/31/24  1046   BLOOD CULTURE  No Growth at 24 hrs.  Received in Microbiology Lab. Culture in Progress. Candida albicans* Candida albicans*  --    SPUTUM CULTURE   --   --   --  3+ Growth of Stenotrophomonas maltophilia*  2+ Growth of   GRAM STAIN RESULT   --  Budding yeast* Yeast* 4+ Gram negative rods*  No polys seen*       Lab interpretation/comments: Leukocytosis noted today    Imaging interpretation/comments: Repeat CT of the abdomen and pelvis without any acute/new intra-abdominal pathology.  Improved groundglass changes in the lungs  CT of the head without any intracranial abnormality and patient has mastoid and middle ear effusions.    Culture data: Repeat blood cultures from 4/4 negative at 24 hours.

## 2024-04-05 NOTE — PHYSICAL THERAPY NOTE
Physical Therapy Cancellation Note    PT orders received chart review completed. Pt is currently in CRRT and not appropriate to participate in skilled PT at this time. PT will follow and treat as medically appropriate.     04/05/24 1000   Note Type   Note type Cancelled Session   Cancel Reasons Other       Tania Augustin, PT

## 2024-04-05 NOTE — PROGRESS NOTES
[facility]  Progress Note: Critical Care  Name: Carla Hunt 59 y.o. 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; complicated by poor wound healing s/p OR with surgically created mucus fistula and end ileostomy  Hyperphosphatemia in setting of #9; improving on CRRT  ELIESER on CKD3; improving on CRRT  Esophagitis; on PPI  Bilateral ground glass opacities, persistent, improving; persistent COIVD infection now resolved   Pancytopenia- multifactorial including hx b-cell lymphoma, persistent inflammation, s/p granix x3  Acute on chronic anemia- multifactorial-intermittent blood loss from ostomy site, chronic inflammation, iatrogenic, marrow dysfunction in setting of persistent inflammation; requiring intermittent prbc's  Lymphopenia with bandemia, in setting of high dose corticosteroids, improving s/p granix x3  Oropharyngeal dysphagia, likely 2/2 multiple prolonged intubations; awaiting NMES  Cutaneous paratracheal ulceration  Gross hematuria, intermittent  B-Cell lymphoma, s/p chemotheraphy 2022, Rituxan maintenance therapy on hold  Seizure disorder; on vimpat  Steroid induced hyperglycemia;on insulin gtt  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  Hx of complex migraines s/p L temporal lobectomy 2007 complicated by below  Hx of complex seizures in setting of #17, on AEDs  COVID-19 infection POA; resolved; s/p multiple courses of antivirals 2/2 persistent infection,  most recent  Remdesivir course completed 3/15, superimposed by recurrent PNA s/p multiple courses Abx  Minimal SAH POA, no interventions required, resolved on repeat CTH     Neuro:  Diagnosis: Alertness  -Hold modafinil 200mg qd   -Delirium precautions  Diagnosis: Analgesia  -PRN Fentanyl 25mcg/hr; on holiday for frequent neurochecks     Diagnosis: Metabolic encephalopathy; POA, improving  -Waxing and waning neuro exam since admission  -Most recent repeat CTH 3/13 negative for acute intracranial findings.  Has had extensive neurological workup including MRI/CT neuroimaging, LP with unremarkable findings  -Now remains off sedation. Electrolytes, sodium, Hyperglycemia 2/2 high dose steroids requiring insulin gtt. Ammonia normal. May be prolonged 2/2 PNA, possibly worsened by uremic encephalopathy worsened by ELIESER now resolved, uremia improving; candida growth seen on BAL Cx from 3/11, may be respiraotry colonization, however currently without signs of systemic fungal infection but is at high risk 2/2 lymphopenia, depressed immunoglobulins in setting of B cell lymphoma and high dose corticosteroids.   3/31- More obtunded, mentation worsening. STAT CTH w no new changes.   -Bcx 2/2 (3/31) growing fungemia, identified as candida albicans  -Bedside EGD x2 (4/2,4/3) Ulcerated mucosa in the upper third of the esophagus without evidence of hemorrhage   -Cryptococcal ag negative  -Aspergillus galactomannan ag negative  -Tb quantiferon gold indeterminate 2/2 lymphopenia     Plan:  -Slow steroid wean: currently IV Solumedrol 30mg qd day 3/3, followed by 20mg x3d followed by 10mg x3d then stop  -Continue CRRT to correct uremia, electrolyte derangements   -S/P 14d Remdesivir course to tx COVID-19, completed 3/15  -S/P 14d Minocycline course to tx stenotrophomonas PNA, completed 3/27   -Continue modafinil as above  -Avoid PRN fentanyl/sedative meds as able.  -Delirium precautions  -Frequent neurochecks  -Defer MRI brain due to clinical  improvement, not likely to      Diagnosis: seizure disorder, known history  -S/p partial left temporal lobe resection in 2007 2/2 complex migraines  -On Lamictal 150 bid and Topamax 50mg bid at home  -Last lamotrigine level from 03/02 at 10.7 (therapeutic)  -Home Lamictal switched to Vimpat 3/27 due to worsening pancytopenia, neuro following  -Continue Vimpat 100mg bid maintenance dose  -Continue home topiramate 50mg bid  -Seizure precautions      Diagnosis: Anxiety, known history  -On Effexor 12.5 mg TID     CV:  -Diagnosis: Sinus tachycardia  -TTE 3/17 with no major structural dysfunction  -Multifactorial 2/2 deconditioning, dehydration/hypvol, acute on chronic anemia, underlying infectious/inflammatory process, pain, Modafinal-induced  TTE 4/1 with EF 70%, no WMA, no changes from prior  Plan:  - See plans under Pulm, Heme/Onc, ID, MSK    Pulm:  Diagnosis: Acute hypoxic respiratory failure;  Diagnosis: Bilateral ground glass opacities, improving  -Vent dependent; s/p trach 3/12 after was reintubated 3/11 due to increased WOB  -Persistently COVID+ since admission s/p multiple courses of antivirals (most recent completed 3/15), complicated by recurrent bacterial PNA treated w 10d levaquin (completed 2/25) for MDR PNA; most recent BAL +recurrent stenotrophomona treated with Bactrim, swithced to minocyline 14d course completed 3/28.  -Etiology Likely multifactorial including possible COVID pneumonitis vs recurrent PNA vs hypersensitivity pneumonitis 2/2 ?rituxumab. Persistent inflammation likely given patient has been steroid responsive throughout admission.   -CRP increasing but likely from intraabdominal inflammation as patient is noted to have clinical improvement with weaning of steroids, ABx.   -Repeat CXR 3/22 with significant improvement of multifocal PNA. 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 TT speech therapist when indicated.    -Continue tube feeds for now until potential PEG placement pending abdominal wound healing as per Gen Surg    Diagnosis: Esophagitis   Acute on chronic anemia associated bloody NG output on 4/3  Uremic, worsening suggestive of UGIB given relatively stable Cr  Bedside EGD x2 (4/2,4/3) Ulcerated mucosa in the upper third of the esophagus without evidence of hemorrhage   Plan:  Follow up HSV/CMV/candida from GI sample  Continue PPI IV bid  Monitor NGT output, stool output    :  Diagnosis: Uremia  -?catabolic from steroids, may also have ?slow UGIB in setting of prolonged steroids though no overt s/s of GIB and H&H stable since 3/23 and patient is on ppi  -Trending up   -EGD without active source of bleedig   -FEurea suggestive of intrinsic pathology   -Improving with CRRT  -BMP q12h      Diagnosis: ELIESER on CKD III,   -Baseline Cr  0.8-1.2  -Cr now stable and at  baseline.   -UO adequate, on Ortiz, draining clear yellow urine  -Prerenal azotemia 2/2 uremia,   Plan:  -Trend daily BMP  -Continue to monitor I/O and UOP  -Avoid nephrotoxins, contrast dye as able  -Improving with CRRT     Diagnosis: Hyperphosphatemia:  -Phos elevated 6s likely 2/2 ELIESER/reduced excretion.   -Vitamin D/PTH wnl.   -Improving with CRRT    F/E/N:  F: on CRRT  E: monitor and replete for goal K>4, P>3, Mg>2  N: tube feeds with vital 1.5; 45 cc/h, Prosource liquid protein to 1 packet BID, Refugio BID to support wound healing       Heme/Onc:  Diagnosis: Pancytopenia, improving with intermittent plt/prbc's transfusion, s/p Filgastrim x3  -In setting of hx of B cell lymphoma, prior chemo, Rituxan therapy, prior abx and present meds including lamictal  -Hemo onc consulted, empirically given Filgastrim 300mg qd x3d (complated 3/30); IR bone marrow bx deferred by heme-onc   -Lamictal switched to Vimpat in consultation with neuro as above due to potential contribution to pancytopenia  -Trend CBC and diff daily, neutropenic precautions for ANC <500    Diagnosis: Acute on chronic anemia  -Baseline Hgb ~7-8. S/P 2U PRBCs 3/17 after repeat Hgb 5.3, total of 6U transfuse  d since admission.  -Patient had significant blood loss from ostomy site 3/20, resulting in hemorraghic shock, improved with blood transfusion; hemostasis achieved after bleeding source identified and sutured. Another large vol danie bloody output from osotomy on 3/21, bleeding source within ostomy site, sutured.    -Also having intermittent vaginal bleeding  -EGD 4/3,4/4 without active bleeding  -?Worsened by impaired marrow response liekly 2/2 persistent inflammation due to low retic index ~1 prior to most recent transfusions; normocytic with normal B12/folate levels; MCV<100. Iatrogenic cause likely contributing 2/2 frequent blood draws; chronic anemia likely persistent inflammatory state/CKD/lymphoma in setting of elevated ferritin of 974.  SPEP/UPEP negative. Hemolysis workup negative.   Plan:  -Routine monitoring ostomy output, if bleeding, apply direct pressure and contact gen surg STAT for hemostasis .  -Continue tube feeds/nutritional support  -Trend CBC, transfuse Transfuse with leukoreduced and irradiated RBCs to maintain Hgb>7  -See plan under pancytopenia     Diagnosis: Thrombocytopenia  -Likely multifactorial including imparied production from marrow dysfunction in setting of persistent inflammation; RI low suggestive of hyperproliferation, hx of B-cell lymphoma, recent infections including persistent COVID, PNA treated, CMV negative. No known history of vWD/congenital disorders. No liver dysfunction; recent hepatic profile unremarkable. HIT workup negative, Hemolytic workup negative. No s/s DIC. No mechanical valves. ?Primary ITP.  Plan:  -Filgrastim 300mg x3 to aid in plt recovery  -Transfuse with leukoreduced and irradiated PLTs if count <20k due to increased risk of bleeding   -Goal >50k if bleeding; goal >20K if no bleeding  -Hold Lovenox for DVT ppx if Plt<50k.  -See plan under pancytopenia      Diagnosis Lymphopenia with bandemia  -In setting of high dose steroids  -Severely depressed immunoglobulins inclding IgG, IgA, IgM  -At risk for further infections  -Filgrastim 300mg x3 to aid in plt recovery  -Contact and airborne precautions    Diagnosis: B cell lymphoma  -Follows with heme[facility]onc at [facility]  -S/P 6 cycles of chemotherapy in 20222  -Maintained on Rituxan for 2 year course PTA, started May 2022, last dose was 12/2024, treatment currently on hold in setting of persistent COVID-19 infection  Anti-Ritux Ab negative  Plan:  -Holding rituximab in setting of persistent COVID-19 infection, now resolved.  ?resume rituxubmab pending clinical stability & anti-ritux ab status in consultation with heme-onc     DVT ppx: Lovenox     Endo:  Diagnosis: steroid hyperglycemia and diabetes mellitus type 2, A1c at 6.6 from 01/2024  -Goal BG  140-180  -Insulin gtt     ID:  Diagnosis: fungemia   Bcx x2 (3/31) growing yeast, ID panel- candida albicans  In setting of severe lymphopenia and severely depressed immunoglobulins   TTE already done 4/1, no new changes  Ophtho consulted, no evidence of ophthalmic endophthalmitis   Plan:   Hold further micofungin doses   Continue fluconazole, dose increased to 800 mg daily due to CRRT   Avoid broad spectrum abx as it can contribute to fungal growth, however requiring Zosyn in setting of fecal contamination of wound, possible peritoneal contamination  Central/midlines changed  F/U Repeat Bcx to ensure clearance    ID following    Diagnosis: PCP ppx  On IV bactrim DS 2/2 prolonged high dose steroid therapy     Diagnosis: Recurrent bacterial pneumonia  - Patient has completed multiple treatment courses of remdesivir throughout hospitalization in addition to 7 days of ceftriaxone.  - BAL cultures in 2/2024 positive for MDR Pseudomonas and stenotrophomonas treated with 10d course of Levaquin  - CT C/A/P 3/10 with persistent groundglass opacities and changes suggestive of sequelae of COVID, prior infections.  Completed 10d course of cefepime for broad spectrum coveragge (completed (3/13)  -Repeat BAL cultures from 3/11 showing 4+ growth Stenotrophomonas maltophilia. Could be colonization given prior BAL growth of same, however due to recent intubation with prolonged corticosteroid theraphy, will begin treating as true pathogen. Patient otherwise remains afebrile, no leukocytosis. CRP with down trending.  Previously on Bactrim from 3/13 to 3/18, discontinued due to worsening ELIESER  Plan:  -S/P 14d Minocycline course to tx stenotrophomonas PNA, completed 3/27   -IV steroids as above  -Continue minocycline sputum Cx growing steno (3/31)       MSK/Skin:  Diagnosis: Midline incision wound with poor wound healing-  -General surgery performed staple removal of the patient's midline incision on 3/12 with some skin signs  appreciated at the inferior aspect of the wound without obvious pus drainage. Overnight 3/13, wound dehiscence progressed requiring general surgery reevaluation. Red/brown aspirate noted, fascia intact. Wet-to-dry dressings in place. General surgery following for next Epson wound care.  -Poor wound healing in setting of high-dose steroid therapy.  No  exam no obvious signs of infection at this time.   -S/P wound vac replacement 3/13 as per gen surgery. No active concerns for cellulitis.  Plan:  -Wound VAC management as per general surgery  -Wound care for peritracheostomy wound  -Abdominal wound care as per general surgery  -Routine monitoring     Diagnosis: Critical illness myopathy  -Likely exacerbated by high-dose steroid therapy  Plan:  -Continue to wean steroids as above  -Aggressive PT/OT once clinically stable        Disposition: Critical care    ICU Core Measures     Vented Patient  VAP Bundle  VAP bundle ordered     A: Assess, Prevent, and Manage Pain Has pain been assessed? Yes  Need for changes to pain regimen? NA   B: Both Spontaneous Awakening Trials (SATs) and Spontaneous Breathing Trials (SBTs) Plan to perform spontaneous awakening trial today? N/A   Plan to perform spontaneous breathing trial today? N/A   Obvious barriers to extubation? NA   C: Choice of Sedation RASS Goal: N/A patient not on sedation  Need for changes to sedation or analgesia regimen? NA   D: Delirium CAM-ICU: Negative   E: Early Mobility  Plan for early mobility? Yes   F: Family Engagement Plan for family engagement today? Yes       Antibiotic Review: Patient on appropriate coverage based on culture data.  and Infectious disease consulted    Review of Invasive Devices:    Ortiz Plan: voiding trial today        Prophylaxis:  VTE Contraindicated secondary to: Plt <50   Stress Ulcer  covered bypantoprazole (PROTONIX) injection 40 mg [[id number]]        Significant 24hr Events       Overnight: ·   S/P OR wound debridement and washout.  Intra-op findings notable for significant fecal contamination of midline wound. Per OP note, small area of exposed bowel in the upper left area of the midline wound -- bowel was socked in to surrounding fascia with no obvious tunneling into peritoneal cavity, skin/subcutaneous bridge between retracted ostomy/mucus fistula site and midline wound was resected, as was umbilical stalk due to tunneling with resultant fecal contamination of surrounding tissue. Stoma and mucus fistula were isolated using coloplast and plastic nipple. Gauze was packed over remainder of wound base, then covered with ostobarrier. No complications.  Resumed CRRT post OR.       Subjective   Unable to obtain ROS as below    Review of Systems   Unable to perform ROS: Acuity of condition        Objective                            Vitals I/O      Most Recent Min/Max in 24hrs   Temp (!) 97.3 °F (36.3 °C) Temp  Min: 96.3 °F (35.7 °C)  Max: 100.9 °F (38.3 °C)   Pulse (!) 118 Pulse  Min: 102  Max: 141   Resp 16 Resp  Min: 16  Max: 30   /56 BP  Min: 98/55  Max: 142/72   O2 Sat 99 % SpO2  Min: 95 %  Max: 100 %     Vent: APVcmv  16/450/peep6/40%    LDA  Peripherals x3, HD cath (R)   Ileostomy RUQ, draining bilious o/p  Wound vac  NGT   Ortiz  Surgical airway , cuffed       Intake/Output Summary (Last 24 hours) at 4/5/2024 0653  Last data filed at 4/5/2024 0601  Gross per 24 hour   Intake 2131.47 ml   Output 1532 ml   Net 599.47 ml       Diet NPO    Invasive Monitoring             Physical Exam   Physical Exam  Eyes:      Pupils: Pupils are equal, round, and reactive to light.   Skin:     General: Skin is warm and dry.      Findings: Wound present.      Comments: Abd wound covered in packed dressing   HENT:      Nose: No epistaxis.      Mouth/Throat:      Mouth: Mucous membranes are moist.   Neck:      Trachea: Tracheostomy present.   Cardiovascular:      Rate and Rhythm: Regular rhythm. Tachycardia present.      Heart sounds: No murmur  heard.  Musculoskeletal:         General: No swelling.      Right lower leg: No edema.      Left lower leg: No edema.   Abdominal: General: An ostomy site is present. There is distension and ostomy site.     Palpations: Abdomen is soft.      Tenderness: There is no abdominal tenderness.      Comments: Wound vac   Constitutional:       Appearance: She is ill-appearing.      Interventions: She is intubated. She is not sedated.  Pulmonary:      Effort: Tachypnea present. No accessory muscle usage, respiratory distress or accessory muscle usage. She is intubated.      Breath sounds: Normal breath sounds.   Neurological:      Comments: Intubated   , Lethargic appearing  and Unable to assess strength due to profound weakness in all extremities    Genitourinary/Anorectal:     Comments: Ortiz draining clear yellow urine  Ortiz present.          Diagnostic Studies      EKG: no new  Imaging: no new I have personally reviewed pertinent reports.       Medications:  Scheduled PRN   calcium gluconate, 1 g, Once  chlorhexidine, 15 mL, Q12H SARTHAK  fluconazole, 800 mg, Q24H  lacosamide, 100 mg, Q12H  levalbuterol, 1.25 mg, TID  methylPREDNISolone sodium succinate, 30 mg, Daily   Followed by  [START ON 4/6/2024] methylPREDNISolone sodium succinate, 20 mg, Daily   Followed by  [START ON 4/9/2024] methylPREDNISolone sodium succinate, 10 mg, Daily  minocycline, 200 mg, Q12H  nystatin, , BID  pantoprazole, 40 mg, Q12H SARTHAK  piperacillin-tazobactam, 4.5 g, Q8H  polyethylene glycol, 17 g, Daily  sodium chloride, 2 spray, BID  sodium chloride, 4 mL, TID  sulfamethoxazole-trimethoprim, 1 tablet, Once per day on Monday Wednesday Friday      acetaminophen, 650 mg, Q6H PRN  fentaNYL, 25 mcg, Q1H PRN  ondansetron, 4 mg, Q6H PRN  sodium chloride, 1 Application, Q1H PRN       Continuous    insulin regular (HumuLIN R,NovoLIN R) 1 Units/mL in sodium chloride 0.9 % 100 mL infusion, 0.3-21 Units/hr, Last Rate: Stopped (04/05/24 0000)  NxStage K 4/Ca 3,  20,000 mL           Labs:    CBC    Recent Labs     04/04/24  1802 04/04/24 2312 04/05/24  0522   WBC 11.56* 11.03* 13.25*   HGB 7.4* 6.5* 9.4*  9.4*   HCT 23.0* 19.7* 29.1*  29.0*   PLT 57* 52* 57*   BANDSPCT 14* 3  --      BMP    Recent Labs     04/04/24 2312 04/05/24  0522   SODIUM 146 141   K 4.4 4.6   * 108   CO2 20* 21   AGAP 11 12   * 76*   CREATININE 1.45* 1.07   CALCIUM 9.4 10.0             Coags    Recent Labs     04/04/24  0118 04/04/24  0425   INR 1.92* 2.00*   PTT 40*  --         Additional Electrolytes  Recent Labs     04/04/24 2312 04/05/24  0522   MG 2.2 2.1   PHOS 5.8* 5.1*   CAIONIZED 1.34* 1.41*          Blood Gas    No recent results  Recent Labs     04/04/24  0647   PHVEN 7.284*   KAP4CYH 38.5*   PO2VEN 64.3*   QYB5ZCI 17.8*   BEVEN -8.1   [id number] 90.3*      LFTs  Recent Labs     04/04/24  0752   ALT 34   AST 21   ALKPHOS 381*   ALB 2.2*   TBILI 1.46*           Infectious  No recent results     Bcx x2 4/4 NG24    Bcx x2 (3/31) Yeast, ID panel- candida albicans  Sputum Cx (3/31)- Stenotrophomonas maltophilia    quantiferon gold inderterminent    Cryptococcal negative  Aspergillus negative    COVID ag 3/25 negative  COVID ag 3/27 negative    BAL 3/11   --4+ steno malto., 2+ candida  --Viral Cx neg  --Fungal 4+ candida  CMV neg  PCP smear neg  COVID pcr + on 3/11 Glucose  Recent Labs     04/04/24  0425 04/04/24 1802 04/04/24 2312 04/05/24  0522   GLUC 107 117 73 92               Amdandre Lazcano, DO

## 2024-04-06 PROBLEM — I63.9 CEREBROVASCULAR ACCIDENT (CVA) DUE TO EMBOLISM (HCC): Status: ACTIVE | Noted: 2024-04-06

## 2024-04-06 LAB
ANION GAP SERPL CALCULATED.3IONS-SCNC: 13 MMOL/L (ref 4–13)
ANION GAP SERPL CALCULATED.3IONS-SCNC: 13 MMOL/L (ref 4–13)
ANION GAP SERPL CALCULATED.3IONS-SCNC: 14 MMOL/L (ref 4–13)
ANISOCYTOSIS BLD QL SMEAR: PRESENT
ANISOCYTOSIS BLD QL SMEAR: PRESENT
BACTERIA UR QL AUTO: ABNORMAL /HPF
BASOPHILS # BLD MANUAL: 0 THOUSAND/UL (ref 0–0.1)
BASOPHILS # BLD MANUAL: 0 THOUSAND/UL (ref 0–0.1)
BASOPHILS NFR MAR MANUAL: 0 % (ref 0–1)
BASOPHILS NFR MAR MANUAL: 0 % (ref 0–1)
BILIRUB UR QL STRIP: NEGATIVE
BUN SERPL-MCNC: 38 MG/DL (ref 5–25)
BUN SERPL-MCNC: 40 MG/DL (ref 5–25)
BUN SERPL-MCNC: 43 MG/DL (ref 5–25)
CA-I BLD-SCNC: 1.41 MMOL/L (ref 1.12–1.32)
CA-I BLD-SCNC: 1.43 MMOL/L (ref 1.12–1.32)
CALCIUM SERPL-MCNC: 10 MG/DL (ref 8.4–10.2)
CALCIUM SERPL-MCNC: 10.1 MG/DL (ref 8.4–10.2)
CALCIUM SERPL-MCNC: 10.2 MG/DL (ref 8.4–10.2)
CHLORIDE SERPL-SCNC: 104 MMOL/L (ref 96–108)
CHLORIDE SERPL-SCNC: 104 MMOL/L (ref 96–108)
CHLORIDE SERPL-SCNC: 105 MMOL/L (ref 96–108)
CLARITY UR: ABNORMAL
CO2 SERPL-SCNC: 19 MMOL/L (ref 21–32)
CO2 SERPL-SCNC: 19 MMOL/L (ref 21–32)
CO2 SERPL-SCNC: 20 MMOL/L (ref 21–32)
COLOR UR: YELLOW
CREAT SERPL-MCNC: 0.62 MG/DL (ref 0.6–1.3)
CREAT SERPL-MCNC: 0.62 MG/DL (ref 0.6–1.3)
CREAT SERPL-MCNC: 0.66 MG/DL (ref 0.6–1.3)
DACRYOCYTES BLD QL SMEAR: PRESENT
DOHLE BOD BLD QL SMEAR: PRESENT
EOSINOPHIL # BLD MANUAL: 0 THOUSAND/UL (ref 0–0.4)
EOSINOPHIL # BLD MANUAL: 0 THOUSAND/UL (ref 0–0.4)
EOSINOPHIL NFR BLD MANUAL: 0 % (ref 0–6)
EOSINOPHIL NFR BLD MANUAL: 0 % (ref 0–6)
ERYTHROCYTE [DISTWIDTH] IN BLOOD BY AUTOMATED COUNT: 19.6 % (ref 11.6–15.1)
ERYTHROCYTE [DISTWIDTH] IN BLOOD BY AUTOMATED COUNT: 19.7 % (ref 11.6–15.1)
GFR SERPL CREATININE-BSD FRML MDRD: 97 ML/MIN/1.73SQ M
GFR SERPL CREATININE-BSD FRML MDRD: 99 ML/MIN/1.73SQ M
GFR SERPL CREATININE-BSD FRML MDRD: 99 ML/MIN/1.73SQ M
GLUCOSE SERPL-MCNC: 139 MG/DL (ref 65–140)
GLUCOSE SERPL-MCNC: 140 MG/DL (ref 65–140)
GLUCOSE SERPL-MCNC: 144 MG/DL (ref 65–140)
GLUCOSE SERPL-MCNC: 144 MG/DL (ref 65–140)
GLUCOSE SERPL-MCNC: 148 MG/DL (ref 65–140)
GLUCOSE SERPL-MCNC: 151 MG/DL (ref 65–140)
GLUCOSE SERPL-MCNC: 163 MG/DL (ref 65–140)
GLUCOSE SERPL-MCNC: 186 MG/DL (ref 65–140)
GLUCOSE SERPL-MCNC: 203 MG/DL (ref 65–140)
GLUCOSE UR STRIP-MCNC: NEGATIVE MG/DL
HCT VFR BLD AUTO: 25.2 % (ref 34.8–46.1)
HCT VFR BLD AUTO: 25.6 % (ref 34.8–46.1)
HELMET CELLS BLD QL SMEAR: PRESENT
HGB BLD-MCNC: 8 G/DL (ref 11.5–15.4)
HGB BLD-MCNC: 8.2 G/DL (ref 11.5–15.4)
HGB UR QL STRIP.AUTO: ABNORMAL
INR PPP: 1.45 (ref 0.84–1.19)
KETONES UR STRIP-MCNC: ABNORMAL MG/DL
LEUKOCYTE ESTERASE UR QL STRIP: ABNORMAL
LG PLATELETS BLD QL SMEAR: PRESENT
LG PLATELETS BLD QL SMEAR: PRESENT
LYMPHOCYTES # BLD AUTO: 0 % (ref 14–44)
LYMPHOCYTES # BLD AUTO: 0 THOUSAND/UL (ref 0.6–4.47)
LYMPHOCYTES # BLD AUTO: 0.43 THOUSAND/UL (ref 0.6–4.47)
LYMPHOCYTES # BLD AUTO: 3 % (ref 14–44)
MAGNESIUM SERPL-MCNC: 2 MG/DL (ref 1.9–2.7)
MAGNESIUM SERPL-MCNC: 2.2 MG/DL (ref 1.9–2.7)
MCH RBC QN AUTO: 29.6 PG (ref 26.8–34.3)
MCH RBC QN AUTO: 29.8 PG (ref 26.8–34.3)
MCHC RBC AUTO-ENTMCNC: 31.7 G/DL (ref 31.4–37.4)
MCHC RBC AUTO-ENTMCNC: 32 G/DL (ref 31.4–37.4)
MCV RBC AUTO: 93 FL (ref 82–98)
MCV RBC AUTO: 93 FL (ref 82–98)
METAMYELOCYTES NFR BLD MANUAL: 5 % (ref 0–1)
METAMYELOCYTES NFR BLD MANUAL: 7 % (ref 0–1)
MONOCYTES # BLD AUTO: 0.14 THOUSAND/UL (ref 0–1.22)
MONOCYTES # BLD AUTO: 0.87 THOUSAND/UL (ref 0–1.22)
MONOCYTES NFR BLD: 1 % (ref 4–12)
MONOCYTES NFR BLD: 6 % (ref 4–12)
MYELOCYTES NFR BLD MANUAL: 2 % (ref 0–1)
MYELOCYTES NFR BLD MANUAL: 5 % (ref 0–1)
NEUTROPHILS # BLD MANUAL: 12.13 THOUSAND/UL (ref 1.85–7.62)
NEUTROPHILS # BLD MANUAL: 12.53 THOUSAND/UL (ref 1.85–7.62)
NEUTS BAND NFR BLD MANUAL: 4 % (ref 0–8)
NEUTS BAND NFR BLD MANUAL: 42 % (ref 0–8)
NEUTS SEG NFR BLD AUTO: 44 % (ref 43–75)
NEUTS SEG NFR BLD AUTO: 80 % (ref 43–75)
NITRITE UR QL STRIP: NEGATIVE
NON-SQ EPI CELLS URNS QL MICRO: ABNORMAL /HPF
NRBC BLD AUTO-RTO: 4 /100 WBC (ref 0–2)
NRBC BLD AUTO-RTO: 8 /100 WBC (ref 0–2)
PH UR STRIP.AUTO: 6 [PH]
PHOSPHATE SERPL-MCNC: 3.9 MG/DL (ref 2.7–4.5)
PHOSPHATE SERPL-MCNC: 4.1 MG/DL (ref 2.7–4.5)
PLATELET # BLD AUTO: 58 THOUSANDS/UL (ref 149–390)
PLATELET # BLD AUTO: 58 THOUSANDS/UL (ref 149–390)
PLATELET BLD QL SMEAR: ABNORMAL
PLATELET BLD QL SMEAR: ABNORMAL
PLATELET CLUMP BLD QL SMEAR: PRESENT
POIKILOCYTOSIS BLD QL SMEAR: PRESENT
POLYCHROMASIA BLD QL SMEAR: PRESENT
POTASSIUM SERPL-SCNC: 4.4 MMOL/L (ref 3.5–5.3)
POTASSIUM SERPL-SCNC: 4.8 MMOL/L (ref 3.5–5.3)
POTASSIUM SERPL-SCNC: 4.8 MMOL/L (ref 3.5–5.3)
PROCALCITONIN SERPL-MCNC: 5.61 NG/ML
PROMYELOCYTES NFR BLD MANUAL: 1 % (ref 0–0)
PROT UR STRIP-MCNC: ABNORMAL MG/DL
PROTHROMBIN TIME: 17.4 SECONDS (ref 11.6–14.5)
RBC # BLD AUTO: 2.7 MILLION/UL (ref 3.81–5.12)
RBC # BLD AUTO: 2.75 MILLION/UL (ref 3.81–5.12)
RBC #/AREA URNS AUTO: ABNORMAL /HPF
RBC MORPH BLD: PRESENT
RENAL EPI CELLS #/AREA URNS HPF: PRESENT /[HPF]
SODIUM SERPL-SCNC: 136 MMOL/L (ref 135–147)
SODIUM SERPL-SCNC: 137 MMOL/L (ref 135–147)
SODIUM SERPL-SCNC: 138 MMOL/L (ref 135–147)
SP GR UR STRIP.AUTO: 1.02 (ref 1–1.03)
T3FREE SERPL-MCNC: 3.36 PG/ML (ref 2.5–3.9)
T4 FREE SERPL-MCNC: 2.06 NG/DL (ref 0.61–1.12)
TSH SERPL DL<=0.05 MIU/L-ACNC: 0.02 UIU/ML (ref 0.45–4.5)
UROBILINOGEN UR STRIP-ACNC: <2 MG/DL
WBC # BLD AUTO: 14.44 THOUSAND/UL (ref 4.31–10.16)
WBC # BLD AUTO: 14.57 THOUSAND/UL (ref 4.31–10.16)
WBC #/AREA URNS AUTO: ABNORMAL /HPF

## 2024-04-06 PROCEDURE — 87106 FUNGI IDENTIFICATION YEAST: CPT

## 2024-04-06 PROCEDURE — 94760 N-INVAS EAR/PLS OXIMETRY 1: CPT

## 2024-04-06 PROCEDURE — 83735 ASSAY OF MAGNESIUM: CPT | Performed by: STUDENT IN AN ORGANIZED HEALTH CARE EDUCATION/TRAINING PROGRAM

## 2024-04-06 PROCEDURE — 84100 ASSAY OF PHOSPHORUS: CPT | Performed by: STUDENT IN AN ORGANIZED HEALTH CARE EDUCATION/TRAINING PROGRAM

## 2024-04-06 PROCEDURE — 94003 VENT MGMT INPAT SUBQ DAY: CPT

## 2024-04-06 PROCEDURE — 87040 BLOOD CULTURE FOR BACTERIA: CPT | Performed by: INTERNAL MEDICINE

## 2024-04-06 PROCEDURE — 84481 FREE ASSAY (FT-3): CPT | Performed by: STUDENT IN AN ORGANIZED HEALTH CARE EDUCATION/TRAINING PROGRAM

## 2024-04-06 PROCEDURE — C9113 INJ PANTOPRAZOLE SODIUM, VIA: HCPCS | Performed by: STUDENT IN AN ORGANIZED HEALTH CARE EDUCATION/TRAINING PROGRAM

## 2024-04-06 PROCEDURE — 99233 SBSQ HOSP IP/OBS HIGH 50: CPT | Performed by: INTERNAL MEDICINE

## 2024-04-06 PROCEDURE — 94640 AIRWAY INHALATION TREATMENT: CPT

## 2024-04-06 PROCEDURE — 87086 URINE CULTURE/COLONY COUNT: CPT

## 2024-04-06 PROCEDURE — NC001 PR NO CHARGE: Performed by: PSYCHIATRY & NEUROLOGY

## 2024-04-06 PROCEDURE — NC001 PR NO CHARGE: Performed by: RADIOLOGY

## 2024-04-06 PROCEDURE — 84443 ASSAY THYROID STIM HORMONE: CPT | Performed by: STUDENT IN AN ORGANIZED HEALTH CARE EDUCATION/TRAINING PROGRAM

## 2024-04-06 PROCEDURE — 99233 SBSQ HOSP IP/OBS HIGH 50: CPT | Performed by: PSYCHIATRY & NEUROLOGY

## 2024-04-06 PROCEDURE — 82948 REAGENT STRIP/BLOOD GLUCOSE: CPT

## 2024-04-06 PROCEDURE — 80048 BASIC METABOLIC PNL TOTAL CA: CPT | Performed by: STUDENT IN AN ORGANIZED HEALTH CARE EDUCATION/TRAINING PROGRAM

## 2024-04-06 PROCEDURE — 80048 BASIC METABOLIC PNL TOTAL CA: CPT

## 2024-04-06 PROCEDURE — 85610 PROTHROMBIN TIME: CPT

## 2024-04-06 PROCEDURE — 97605 NEG PRS WND THER DME<=50SQCM: CPT | Performed by: SURGERY

## 2024-04-06 PROCEDURE — 99232 SBSQ HOSP IP/OBS MODERATE 35: CPT | Performed by: PHYSICIAN ASSISTANT

## 2024-04-06 PROCEDURE — 81001 URINALYSIS AUTO W/SCOPE: CPT

## 2024-04-06 PROCEDURE — 82330 ASSAY OF CALCIUM: CPT | Performed by: STUDENT IN AN ORGANIZED HEALTH CARE EDUCATION/TRAINING PROGRAM

## 2024-04-06 PROCEDURE — 85007 BL SMEAR W/DIFF WBC COUNT: CPT

## 2024-04-06 PROCEDURE — 85027 COMPLETE CBC AUTOMATED: CPT

## 2024-04-06 PROCEDURE — 94664 DEMO&/EVAL PT USE INHALER: CPT

## 2024-04-06 PROCEDURE — 99024 POSTOP FOLLOW-UP VISIT: CPT | Performed by: SURGERY

## 2024-04-06 PROCEDURE — 90945 DIALYSIS ONE EVALUATION: CPT

## 2024-04-06 PROCEDURE — 84439 ASSAY OF FREE THYROXINE: CPT | Performed by: STUDENT IN AN ORGANIZED HEALTH CARE EDUCATION/TRAINING PROGRAM

## 2024-04-06 PROCEDURE — 85007 BL SMEAR W/DIFF WBC COUNT: CPT | Performed by: STUDENT IN AN ORGANIZED HEALTH CARE EDUCATION/TRAINING PROGRAM

## 2024-04-06 PROCEDURE — C9254 INJECTION, LACOSAMIDE: HCPCS | Performed by: STUDENT IN AN ORGANIZED HEALTH CARE EDUCATION/TRAINING PROGRAM

## 2024-04-06 PROCEDURE — 99291 CRITICAL CARE FIRST HOUR: CPT | Performed by: INTERNAL MEDICINE

## 2024-04-06 PROCEDURE — 85027 COMPLETE CBC AUTOMATED: CPT | Performed by: STUDENT IN AN ORGANIZED HEALTH CARE EDUCATION/TRAINING PROGRAM

## 2024-04-06 PROCEDURE — 87106 FUNGI IDENTIFICATION YEAST: CPT | Performed by: INTERNAL MEDICINE

## 2024-04-06 RX ORDER — ALBUMIN, HUMAN INJ 5% 5 %
12.5 SOLUTION INTRAVENOUS ONCE
Status: COMPLETED | OUTPATIENT
Start: 2024-04-06 | End: 2024-04-06

## 2024-04-06 RX ORDER — MODAFINIL 100 MG/1
100 TABLET ORAL DAILY
Status: DISCONTINUED | OUTPATIENT
Start: 2024-04-06 | End: 2024-04-08

## 2024-04-06 RX ORDER — ENOXAPARIN SODIUM 100 MG/ML
40 INJECTION SUBCUTANEOUS
Status: DISCONTINUED | OUTPATIENT
Start: 2024-04-07 | End: 2024-04-08

## 2024-04-06 RX ORDER — INSULIN LISPRO 100 [IU]/ML
1-5 INJECTION, SOLUTION INTRAVENOUS; SUBCUTANEOUS EVERY 6 HOURS SCHEDULED
Status: DISCONTINUED | OUTPATIENT
Start: 2024-04-06 | End: 2024-04-07

## 2024-04-06 RX ADMIN — FENTANYL CITRATE 25 MCG: 50 INJECTION INTRAMUSCULAR; INTRAVENOUS at 11:00

## 2024-04-06 RX ADMIN — PIPERACILLIN SODIUM AND TAZOBACTAM SODIUM 4.5 G: 36; 4.5 INJECTION, POWDER, LYOPHILIZED, FOR SOLUTION INTRAVENOUS at 12:00

## 2024-04-06 RX ADMIN — LACOSAMIDE 100 MG: 10 INJECTION, SOLUTION INTRAVENOUS at 21:56

## 2024-04-06 RX ADMIN — FENTANYL CITRATE 25 MCG: 50 INJECTION INTRAMUSCULAR; INTRAVENOUS at 23:35

## 2024-04-06 RX ADMIN — SODIUM CHLORIDE SOLN NEBU 3% 4 ML: 3 NEBU SOLN at 07:59

## 2024-04-06 RX ADMIN — SODIUM CHLORIDE SOLN NEBU 3% 4 ML: 3 NEBU SOLN at 13:40

## 2024-04-06 RX ADMIN — Medication 2 SPRAY: at 21:56

## 2024-04-06 RX ADMIN — CHLORHEXIDINE GLUCONATE 0.12% ORAL RINSE 15 ML: 1.2 LIQUID ORAL at 08:50

## 2024-04-06 RX ADMIN — PANTOPRAZOLE SODIUM 40 MG: 40 INJECTION, POWDER, FOR SOLUTION INTRAVENOUS at 21:56

## 2024-04-06 RX ADMIN — LACOSAMIDE 100 MG: 10 INJECTION, SOLUTION INTRAVENOUS at 08:50

## 2024-04-06 RX ADMIN — MINOCYCLINE HYDROCHLORIDE 200 MG: 50 TABLET, FILM COATED ORAL at 09:07

## 2024-04-06 RX ADMIN — LEVALBUTEROL HYDROCHLORIDE 1.25 MG: 1.25 SOLUTION RESPIRATORY (INHALATION) at 13:40

## 2024-04-06 RX ADMIN — INSULIN LISPRO 1 UNITS: 100 INJECTION, SOLUTION INTRAVENOUS; SUBCUTANEOUS at 18:15

## 2024-04-06 RX ADMIN — PANTOPRAZOLE SODIUM 40 MG: 40 INJECTION, POWDER, FOR SOLUTION INTRAVENOUS at 08:50

## 2024-04-06 RX ADMIN — ENOXAPARIN SODIUM 40 MG: 40 INJECTION SUBCUTANEOUS at 08:50

## 2024-04-06 RX ADMIN — SODIUM CHLORIDE SOLN NEBU 3% 4 ML: 3 NEBU SOLN at 20:14

## 2024-04-06 RX ADMIN — LEVALBUTEROL HYDROCHLORIDE 1.25 MG: 1.25 SOLUTION RESPIRATORY (INHALATION) at 20:15

## 2024-04-06 RX ADMIN — MODAFINIL 100 MG: 100 TABLET ORAL at 11:38

## 2024-04-06 RX ADMIN — PIPERACILLIN SODIUM AND TAZOBACTAM SODIUM 4.5 G: 36; 4.5 INJECTION, POWDER, LYOPHILIZED, FOR SOLUTION INTRAVENOUS at 04:07

## 2024-04-06 RX ADMIN — CHLORHEXIDINE GLUCONATE 0.12% ORAL RINSE 15 ML: 1.2 LIQUID ORAL at 21:56

## 2024-04-06 RX ADMIN — Medication 800 MG: at 08:51

## 2024-04-06 RX ADMIN — ALBUMIN (HUMAN) 12.5 G: 12.5 INJECTION, SOLUTION INTRAVENOUS at 06:39

## 2024-04-06 RX ADMIN — PIPERACILLIN SODIUM AND TAZOBACTAM SODIUM 4.5 G: 36; 4.5 INJECTION, POWDER, LYOPHILIZED, FOR SOLUTION INTRAVENOUS at 18:22

## 2024-04-06 RX ADMIN — Medication 2 SPRAY: at 09:33

## 2024-04-06 RX ADMIN — NYSTATIN: 100000 POWDER TOPICAL at 18:15

## 2024-04-06 RX ADMIN — NYSTATIN: 100000 POWDER TOPICAL at 08:56

## 2024-04-06 RX ADMIN — LEVALBUTEROL HYDROCHLORIDE 1.25 MG: 1.25 SOLUTION RESPIRATORY (INHALATION) at 07:59

## 2024-04-06 RX ADMIN — INSULIN LISPRO 1 UNITS: 100 INJECTION, SOLUTION INTRAVENOUS; SUBCUTANEOUS at 11:50

## 2024-04-06 RX ADMIN — MINOCYCLINE HYDROCHLORIDE 200 MG: 50 TABLET, FILM COATED ORAL at 21:56

## 2024-04-06 RX ADMIN — METHYLPREDNISOLONE SODIUM SUCCINATE 20 MG: 40 INJECTION, POWDER, FOR SOLUTION INTRAMUSCULAR; INTRAVENOUS at 08:50

## 2024-04-06 NOTE — PROGRESS NOTES
NEUROLOGY RESIDENCY PROGRESS NOTE     Name: Carla Hunt   Age & Sex: 59 y.o. female   MRN: [id number]  [facility]   Encounter: [id number]    ASSESSMENT & PLAN     Cerebrovascular accident (CVA) due to embolism (HCC)  Assessment & Plan  Assessment:  MRI 4/6/2024 noted multiple small bilateral foci of acute infarcts. No significant edema, mass effect, or hemorrhagic transformation.  Neurology was consulted due to this finding.    Lab Results   Component Value Date    HGBA1C 6.6 (H) 01/09/2024     Lab Results   Component Value Date    CHOLESTEROL 118 01/09/2024    TRIG 331 (H) 03/13/2024    HDL 40 (L) 01/09/2024    LDLCALC 59 01/09/2024     Impression: Strokes are embolic in nature.  Atrial fibrillation as a cause can be ruled out as the patient has been on telemetry monitoring for practically her whole hospitalization.  The patient's cause of emboli are currently unknown, given the infectious processes the patient currently has septic emboli are high on the differential however would not rule out these being caused by a PFO and a VTE event.  The patient's altered mental status is not due to her strokes as the stroke burden is very minimal.    Plan:  - Recommend ideally a SATURNINO to evaluate for a cause of septic emboli such as vegetation and also a PFO, if a SATURNINO is unable to be completed due to the patient's clinical status then in order to evaluate for a PFO would recommend obtaining a bubble study with TCD if able and then if this is unable to be completed would then recommend a TTE with bubble study.  - At this time would not recommend initiation of aspirin given the strong suspicion for septic emboli and the risks that these would cause bleeding.  - Continue Neuro checks  - Maintain glucose <180, SSI for coverage if indicated  - Repeat CTH if GCS drops 2pts in 1hr  - Atorvastatin 40 mg q.d.  - Continue monitoring on telemetry  - Rest of care per primary team    Encephalopathy  Assessment & Plan  59  y.o. 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 Patient found to have COVID infection and ELIESER in ED. CTH with bifrontal hyperdensities concerning for hemorrhage vs calcifications.  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    1/8: Neurology consulted with concern for focal seizures in setting of Covid infection and worsening renal function.   1/9: loaded with Vimpat 400 mg IV x 1 and Topiramate increased to 150 mg twice daily.   1/10: Continued to be encephalopathic with periods of agitation/combativeness. Afebrile since 1/9 afternoon. No overt seizure-like activity noted by family or staff. On high-flow oxygen, remdesivir and steroids for underlying COVID. Renal function improved. MRI brain w/wo negative for stroke. Patient transferred to [facility] neuro ICU for ongoing management/video EEG monitoring (see below).    3/13: Neurology was reconsulted due to patient not following commands or moving any extremity.  Repeat CT head unremarkable.  High suspicion for TME given prolonged hospitalization, infection, and cefepime use.  3/27: Neurology was reconsulted due to worsening pancytopenia and concern that this was related to Lamictal.  Will discontinue Lamictal and start Vimpat. (Previously on Vimpat at the beginning of her hospitalization due to being NPO and not being able to take her  "oral home AEDs.  No reported side effects).    Neurodiagnostics:   CTA H/N wwo contrast 1/7/24:   \"No acute arterial vascular abnormality in the head or neck. No flow-limiting large vessel arterial vascular stenosis in the head or neck. Tiny punctate foci of relative increased parenchymal density in the frontal lobes bilaterally, unchanged from 2 hours earlier and probably representing low-density parenchymal calcifications rather than parenchymal hemorrhage. Conservative management with an additional follow-up noncontrast head CT is recommended in 24 to 48 hours. Reidentified left temporal lobe resection. Dense basal ganglia and cerebellar calcifications again noted. Groundglass and consolidative airspace opacities most suspicious for extensive pneumonia seen throughout the visualized mid and upper lung zones more dense on the right than on the left.\"  MRI brain wo 1/9/24:  Tiny focus of abnormal diffusion signal within the right basal ganglia involving the anterior limb of the internal capsule not clearly restricted on the ADC maps but suspicious for small acute lacunar infarct.\"   MRI brain seizure wo and w contrast 1/10/24:  \"Previously visualized diffusion signal abnormality in the right basal ganglia is no longer identified and may have been artifactual given underlying susceptibility artifact in the bilateral globus pallidus.\"  Repeat CTH 3/6 and 3/9 with no acute changes.  VEEG (1/11 - 1/13; 57 hours):   Disorganized theta delta activity suggestive of moderate diffuse cerebral dysfunction.  The episode of groaning and nonspecific movements of extremities is nonepileptic in etiology.  No electrographic seizures.  LP 1/12: attempted with low flow CSF, patient very restless. Initially clear fluid then blood tinged. Procedure aborted. Limited CSF results: M/E panel wnl, WBC 1, protein 50, Gram stain with no polys or bacteria  LP 1/15: WBC 1, protein 50, RBC 25, glucose 113, ME negative, HSV PCR negative, Gram " stain/culture negative    Relevant home meds:  Lamictal 200 mg BID  Topiramate 100 mg BID    Hospital AEDs:  Vimpat 200 mg BID started  due to being NPO and unable to take home AEDs; 400mg IV x1 . Decreased to 150 mg BID  secondary to elevated level.  Now on 100 mg daily  Topiramate and Lamictal previously    Plan:  - Continue maintenance Vimpat 100 mg Q12 hours  - Continue provigil 100 mg daily  - Delirium precautions   - Continue to monitor and notify neurology with any changes.   - Medical management and supportive care per primary team. Correction of any metabolic or infectious disturbances        Recommendations for outpatient neurological follow up have yet to be determined.      SUBJECTIVE     Patient was seen and examined.  Neurology has been asked to reevaluate the patient as a MRI of her brain was obtained recently and showed new embolic appearing infarcts.    Review of Systems   Unable to perform ROS: Intubated     OBJECTIVE     Patient ID: Carla Hunt is a 59 y.o. female.    Vitals:    24 1300 24 1400 24 1500 24 1600   BP: 139/82 143/82 137/72 133/74   BP Location:    Right arm   Pulse: (!) 119 (!) 122 (!) 122 (!) 122   Resp: 17 20 20 20   Temp: 97.5 °F (36.4 °C)   98.1 °F (36.7 °C)   TempSrc:    Oral   SpO2: 100% 100% 100% 100%   Weight:       Height:          Temperature:   Temp (24hrs), Av.7 °F (36.5 °C), Min:95.4 °F (35.2 °C), Max:99.5 °F (37.5 °C)    Temperature: 98.1 °F (36.7 °C)    Physical Exam:  Vitals and nursing note reviewed.   Constitutional: Patient is not alert. Ill-appearing, toxic-appearing    HENT: Normocephalic and atraumatic. Nose and Ears normal.    Eyes: No scleral icterus. No discharge.    Neck: Neck Supple. ROM normal.   Cardiovascular: Distal extremities warm without palpable edema or tenderness, no observed significant swelling.    Pulmonary:  Pulmonary effort is normal. Not in respiratory distress.  Patient is intubated and  mechanically ventilated.   Abdominal: Abdomen is flat and not distended   Musculoskeletal: No swelling or deformity.   Skin: Warm and dry   Psychiatric:  Unable to be completed     Neurological Exam  Mental Status  Responsive to painful stimuli.  Patient grimaces to painful stimulation only.  A full mental status examination is unable to be completed due to the patient's current medical status.    Cranial Nerves  CN III, IV, VI: Extraocular movements intact bilaterally. Normal lids and orbits bilaterally. Pupils equal round and reactive to light bilaterally.  CN VII: No apparent facial droop..  A full cranial nerve examination is unable to be completed due to the patient's current medical status.    Motor  Normal muscle bulk throughout.  Patient does not withdraw or have any movement of extremities to painful stimulation.  A full motor examination is unable to be completed due to the patient's current medical status.    Sensory  Patient does not withdraw or have any movement of extremities to painful stimulation.  A full sensory examination is unable to be completed due to the patient's current medical status.    Reflexes  Deep tendon reflexes are 2+ and symmetric except as noted.    Coordination    Coordination is unable to be assessed due to the patient's current medical status.    Gait    Gait examination is unable to be assessed due to the patient's current medical status.    LABORATORY DATA     Labs: Additional Pertinent Lab Tests Reviewed: All Labs Within Last 24 Hours Reviewed  Results from last 7 days   Lab Units 04/06/24  0813 04/05/24  2050 04/05/24  0522   WBC Thousand/uL 14.57* 12.85* 13.25*   HEMOGLOBIN g/dL 8.2* 9.3* 9.4*  9.4*   HEMATOCRIT % 25.6* 28.5* 29.1*  29.0*   PLATELETS Thousands/uL 58* 57* 57*   MONO PCT % 6 2* 1*   EOS PCT % 0 0 0      Results from last 7 days   Lab Units 04/06/24  0503 04/06/24  0100 04/05/24  1841 04/05/24  1158 04/05/24  0522 04/04/24  1802 04/04/24  0752 04/04/24  0118  04/03/24  0430   POTASSIUM mmol/L 4.8 4.4 4.7   < > 4.6   < >  --    < > 3.8  3.8   CHLORIDE mmol/L 104 105 108   < > 108   < >  --    < > 109*  109*   CO2 mmol/L 20* 19* 20*   < > 21   < >  --    < > 19*  19*   BUN mg/dL 40* 43* 51*   < > 76*   < >  --    < > 103*  103*   CREATININE mg/dL 0.62 0.66 0.76   < > 1.07   < >  --    < > 1.48*  1.48*   CALCIUM mg/dL 10.0 10.1 9.8   < > 10.0   < >  --    < > 10.0  10.0   ALK PHOS U/L  --   --   --   --  346*  --  381*  --  210*   ALT U/L  --   --   --   --  32  --  34  --  29   AST U/L  --   --   --   --  20  --  21  --  16    < > = values in this interval not displayed.     Results from last 7 days   Lab Units 04/06/24  0503 04/06/24  0100 04/05/24  1841   MAGNESIUM mg/dL 2.0 2.2 1.9     Results from last 7 days   Lab Units 04/06/24  0503 04/06/24  0100 04/05/24  1841   PHOSPHORUS mg/dL 3.9 4.1 4.5      Results from last 7 days   Lab Units 04/06/24  0813 04/05/24  1158 04/04/24  0425 04/04/24  0118 04/03/24  0430 04/01/24  1553   INR  1.45* 1.63* 2.00* 1.92*   < > 1.75*   PTT seconds  --   --   --  40*  --  35    < > = values in this interval not displayed.     Results from last 7 days   Lab Units 04/04/24  2312   LACTIC ACID mmol/L 0.7           IMAGING & DIAGNOSTIC TESTING     Radiology Results: I have personally reviewed pertinent films in PACS    MRI brain wo contrast   Final Result by Jason Cabrera MD (04/06 1605)      1.  Multiple small bilateral foci of acute infarcts as described suggesting embolic etiology. No significant edema, mass effect, or hemorrhagic transformation.   2.  Partial lack of sulcal CSF suppression in the FLAIR sequence noted in the supratentorial and infratentorial brain. This could be artifactual/technique related; however, inflammatory or infectious process is not excluded. Correlate with clinical    assessment.   3.  Stable postoperative appearance from prior partial left temporal lobe resection.   4.  Complete opacification of bilateral  mastoids air cells and middle ears.         The study was marked in EPIC for immediate notification.      Workstation performed: [id number]         US thyroid   Final Result by Dwight Austin MD (04/05 1231)      1. Limited exam due to overlying tracheostomy tube and central line catheter. Heterogeneous lobular appearance to the remaining thyroid gland, nonspecific, question sequela of thyroiditis. No discrete nodule identified.            Reference: ACR Thyroid Imaging, Reporting and Data System (TI-RADS): White Paper of the ACR TI-RADS Committee. J AM Maria G Radiol 2017;14:587-595. Additional recommendations based on American Thyroid Association 2015 guidelines.         Workstation performed: [id number]         CT chest abdomen pelvis w contrast   Final Result by Galdino Blankenship MD (04/05 1105)      Improved but persistent groundglass and consolidative bilateral pulmonary opacities.      No new intra-abdominal or intrapelvic findings.         Workstation performed: [id number]         CT head wo contrast   Final Result by Jona Morton MD (04/05 1053)      No acute intracranial abnormality status post left partial temporal lobectomy given limitations of beam-hardening streak artifact in posterior fossa.      Similar mild chronic microangiopathy.      Large bilateral mastoid and bilateral middle ear effusions, worsened on right side.                  Workstation performed: [id number]         XR chest portable ICU   Final Result by Jalen James MD (04/04 1615)         1. Interval placement of right IJ catheter tip overlying the lower SVC. Other tubes/lines stable.      2. Similar appearance of moderate diffuse bilateral infectious/inflammatory airspace opacities.            Workstation performed: [id number]         VAS upper limb venous duplex scan, unilateral/limited   Final Result by Cooper Nielsen MD (04/04 1126)      XR chest portable ICU   Final Result by Cooper Cai MD (04/04 0848)       1. Stable appearance of right lung infiltrate, now with developing hazy airspace opacities in the left lung   2. Nasogastric tube tip in the gastric fundus and sideport at the esophagogastric junction. May consider advancement to assure sideport is below the EG junction.      The examination demonstrates a significant  finding and was documented as such in [facility] for liaison and referring practitioner notification.                  Workstation performed: [id number]         CT abdomen pelvis w contrast   Final Result by Anna Maldonado MD (04/01 1428)      Stable postsurgical changes status post ileocolectomy with right lower quadrant ileostomy with persistent associated inflammatory changes and subcutaneous emphysema. No focal drainable fluid collection identified. Trace ascites.      Persistent bilateral lower lobe atelectasis.         Workstation performed: [id number]         XR chest portable   Final Result by Cooper Cai MD (04/01 1328)      Focal consolidation in the right middle lung zone is approximately the same when compared to 3/31/2024, concerning for pneumonia.      Improvement in background groundglass consolidations in the left lung when compared to 3/31/2024.                     Resident: LOUISE LEUNG I, the attending radiologist, have reviewed the images and agree with the final report above.      Workstation performed: [id number]         CT head wo contrast   Final Result by Nawaf Mcneal MD (03/31 1011)         1. No acute intracranial hemorrhage, mass effect or edema.   2. 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   3. 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 Differential diagnosis could include treatment related changes versus other etiologies..                  Workstation performed: [id number]         CT chest w contrast   Final Result by Ravi Rivera MD  (04/01 1754)   Addendum (preliminary) 1 of 1 by Ravi Rivera MD (04/01 1754)   ADDENDUM:      Please note the findings described in the body of an enlarged    heterogeneous right thyroid lobe is new from recent CT chest performed 3    weeks ago.   Given the new tracheostomy this most likely is procedure related possibly    hemorrhage.   Ultrasound characterization may be obtained in a few weeks.      I personally discussed this study with Moises Bowden on 4/1/2024 5:54 PM.      .      Final      Bilateral parenchymal infiltrative changes with consolidation like focus in the right upper lobe is new. Relative worsening.               Workstation performed: [id number]         XR chest portable ICU   Final Result by Kathi Monique MD (03/31 1421)      Persistent groundglass opacity with new consolidation in the right midlung. See chest CT for further evaluation.            Workstation performed: [id number]         XR chest portable ICU   Final Result by Kathi Monique MD (03/30 0752)      Worsening bilateral groundglass opacity.            Workstation performed: [id number]         FL barium swallow video w speech   Final Result by VIRIDIANA VU (03/27 1426)      VAS upper limb venous duplex scan, unilateral/limited   Final Result by Alexandro Ortega DO (03/24 2156)      XR chest portable   Final Result by Ravi Rivera MD (03/22 1524)      Mild congestive changes.            Workstation performed: [id number]         XR chest portable ICU   Final Result by Jalen James MD (03/20 1647)      Overall mild improvement in multifocal bronchopneumonia, likely aspiration related.            Workstation performed: [id number]         XR chest portable ICU   Final Result by Nawaf Montoya MD (03/14 1335)      Worsening consolidation throughout the right lung and left lower lobe consistent with multi lobar pneumonia, possibly aspiration.      The study was marked in EPIC for immediate  notification.      Workstation performed: [id number]         CT head wo contrast   Final Result by E. Alec Schoenberger, MD (03/13 9335)      No acute intracranial pathology. Stable postoperative changes status post left temporal lobectomy. Chronic microangiopathy.   Stable bilateral mastoid effusions, left greater than right and left middle ear effusion.. Stable sinus inflammatory changes.               Workstation performed: [id number]         XR chest portable   Final Result by Scot Edwards MD (03/13 1003)      Limited study. Tracheostomy appears satisfactory in position. Patchy pulmonary infiltrates throughout much of the right lung. Left basilar infiltrate. Otherwise, the left lung appears improved in aeration from prior exam. Recommend continued follow-up            Workstation performed: [id number]         XR chest portable ICU   Final Result by Lisa Carlson MD (03/12 1605)   Tubes and lines in satisfactory position.   Grossly unchanged diffuse parenchymal disease.            Workstation performed: [id number]         XR chest portable ICU   Final Result by Lisa Carlson MD (03/12 1605)   Tubes and lines in satisfactory position.   Grossly unchanged diffuse parenchymal disease.            Workstation performed: [id number]         XR chest portable   Final Result by Kathi Monique MD (03/11 1136)      Persistent extensive bilateral groundglass opacity.            Workstation performed: [id number]         CT chest abdomen pelvis wo contrast   Final Result by Julio C Alegre MD (03/10 1427)   1. Persistent bilateral groundglass and nodular consolidation with peripheral opacification at the right greater than left lung bases. Findings remain concerning for multi lobar infiltrates with possible superimposed COVID-19 opacities.   2. Status post ileocolectomy with a right lower quadrant ileostomy again exhibiting a patent stoma. Persistent inflammatory change and subcutaneous emphysema after recent  intervention. No drainable collection.   3. Bilateral renal calcifications again suggestive of medullary sponge kidney with persistent mild right renal fullness but no evidence of distal obstructive pathology.                     Workstation performed: [id number]         XR abdomen 1 view kub   Final Result by Eduardo Monk MD (03/11 0845)      Nonobstructive bowel gas pattern.               Workstation performed: [id number]         XR chest portable   Final Result by Kathi Monique MD (03/10 0954)      Persistent extensive bilateral groundglass opacity.            Workstation performed: [id number]         CT head wo contrast   Final Result by Lavon Calle MD (03/09 1509)      -No acute intracranial abnormality. Stable microangiopathic changes.   -Stable postoperative changes related to left temporal lobectomy.   -Redemonstrated air-fluid levels within bilateral sphenoid sinuses, left greater than right mastoid effusions and left middle ear effusion. Clinical correlation advised.         I personally communicated the findings via telephone with Ravi Garcia at 3:06 p.m. on 3/9/2024.                  Workstation performed: [id number]         XR chest portable   Final Result by Harry Peraza MD (03/09 1219)      No significant interval change in multifocal patchy groundglass opacities, left side greater than right. Findings are likely related to patient's COVID-19 pneumonia.            Resident: SANDIP Nieto I, the attending radiologist, have reviewed the images and agree with the final report above.      Workstation performed: [id number]         XR chest portable ICU   Final Result by Clinton Epperson MD (03/07 1242)      Diffuse patchy opacification in the right lung, either pneumonia or asymmetric pulmonary edema, improved in appearance since 3/5/2024.      Persistent atelectasis and/or consolidation in the base of the left lower lobe.            Workstation performed: [id number]          CT head wo contrast   Final Result by Jaleel Persaud MD (03/06 1338)      No acute intracranial abnormality.      Stable postoperative changes related to left temporal lobectomy.      Mild chronic microangiopathic ischemic changes.      New air-fluid levels within the left maxillary and bilateral sphenoid sinuses suggestive of acute on chronic sinusitis. New bilateral mastoid fluid.                        Workstation performed: [id number]         CT chest abdomen pelvis wo contrast   Final Result by Dannie Alves MD (03/06 1415)      1. Increased extent of groundglass opacities and patchy consolidations throughout the lungs. Focal consolidations in the posterior lung bases and right middle lobe are not significantly changed compared to March 4, 2024. Again, findings are likely due to    a combination of COVID-19 and bacterial pneumonia.      2. Redemonstrated edema and subcutaneous gas within the anterior abdominal wall fat surrounding the right lower quadrant ostomy site, amount of gas slightly decreased from prior.      4. Mild right pelvocaliectasis and ureterectasis. No obstructing calculi.      5. Fusiform ectasia of the ascending thoracic aorta measuring 40 mm. Recommend follow-up low radiation dose chest CT in 1 year.         The study was marked in EPIC for immediate notification.         Resident: LOUISE LEUNG I, the attending radiologist, have reviewed the images and agree with the final report above.      Workstation performed: [id number]         XR chest portable ICU   Final Result by Dipika Boston MD (03/05 1312)      Enteric tube placement as above. Bilateral patchy opacities, right greater than left.            Workstation performed: [id number]         XR chest portable ICU   Final Result by Kathi Monique MD (03/05 0901)      Persistent extensive bilateral groundglass opacity.            Workstation performed: [id number]         CT chest abdomen pelvis w contrast   Final  "Result by Edward Goodman MD (03/04 1343)      Evolving changes in the lungs which represent some combination of evolving pneumonitis secondary to viral COVID infection, improving atelectasis/bacterial infection in the posterior lower lung zones, and developing focal bacterial type pneumonia in the    lateral aspect of the right middle lobe.      Cellulitic edema and subcutaneous gas in the fatty soft tissue surrounding the ostomy site in the right mid abdomen. This probably represents infectious cellulitis and air tracking backward from the ostomy site. No drainable abscess seen.      Small volume of complex ascites again noted in the dependent hemipelvis.      This examination was marked \"immediate notification\" in Epic in order to begin the standard process by which the radiology reading room liaison alerts the referring practitioner.                  Workstation performed: [id number]         XR chest portable ICU   Final Result by Scot Edwards MD (03/04 1155)      Line and tube appear satisfactory. Patchy bilateral pulmonary infiltrates which may be pneumonia or pulmonary edema. Possible small left pleural effusion            Workstation performed: [id number]         XR shoulder 2+ vw left   Final Result by Eduardo Monk MD (02/29 1017)      No acute osseous abnormality.         Workstation performed: [id number]         XR chest portable ICU   Final Result by Scot Edwards MD (02/28 1059)      Lines and tubes appear satisfactory.      Bilateral left greater than right infiltrates which could be asymmetric pulmonary edema or pneumonia. Correlate with clinical scenario            Workstation performed: [id number]         VAS upper limb venous duplex scan, complete, bilateral   Final Result by Shahriar Sparks MD (02/26 9468)      CT pe study w abdomen pelvis w contrast   Final Result by Miquel Raygoza MD (02/26 0247)      1.  No evidence of pulmonary embolism.   2.  Dense patchy " opacities in the bilateral lower lobes and groundglass opacities within the upper lung fields similar to prior examination likely due to pneumonia.   3.  Mild thickening of the remaining colon which may be due to under distention versus nonspecific colitis.   4.  Small amount of ascites similar to prior examination.         Workstation performed: [id number]         XR chest portable ICU   Final Result by Rissa Tariq MD (02/26 1138)      Similar appearance of diffuse interstitial and hazy/groundglass opacity.      Esophageal temperature probe tip terminates over the middle third of the esophagus, consider advancement to the distal third of the esophagus.         Workstation performed: [id number]         XR abdomen 1 view kub   Final Result by Rissa Tariq MD (02/26 1140)      No acute findings.               Workstation performed: [id number]         XR chest portable ICU   Final Result by Kathi Monique MD (02/25 1523)      Persistent extensive bilateral consolidation and groundglass opacity.            Workstation performed: [id number]         CTA chest (pe study) abdomen pelvis contrast   Final Result by Camila Patel MD (02/24 1317)      No pulmonary embolism      Mild improvement in the previously noted areas of diffuse lung opacification with the clearing in the upper lobe with residual areas of groundglass density and consolidations      Postoperative changes from the resection of the cecum/within the loculated fluid collection in the right paracolic gutter and pelvic cul-de-sac without any abscess with enhancing margins or air-fluid level         Moderate to marked distention of the stomach is again noted though decreased from the previous study of February 20, 2024. The feeding tube remains in place         Incidentally noted is aneurysmal dilatation of the ascending artery measures about 4.1 cm, surveillance suggested      The study was marked in EPIC for significant notification.                Workstation performed: [id number]         RADIOLOGY RESULTS   Final Result by  (02/24 0948)      XR chest portable ICU   Final Result by Nawaf Mcneal MD (02/23 1634)      Worsening pulmonary opacification possibly worsening multifocal pneumonia/pneumonitis versus worsening pulmonary edema.            Workstation performed: [id number]         XR chest portable ICU   Final Result by Ajit Rome DO (02/22 1526)      Similar to slight improved aeration of the lungs bilaterally with persistent bilateral multifocal opacities            Workstation performed: [id number]         XR chest portable   Final Result by Willard Roper MD (02/21 1223)   Persistent diffuse bilateral opacities consistent with multifocal pneumonia or pulmonary edema      Stable tubes and lines         Workstation performed: [id number]         XR chest portable ICU   Final Result by iLsa Carlson MD (02/21 1046)      No significant interval change.            Workstation performed: [id number]         CT chest abdomen pelvis wo contrast   Final Result by Nawaf Ch MD (02/20 1142)      1.  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   2.  Moderate bilateral hydroureteronephrosis presumably due to significantly distended urinary bladder.   3.  Worsening widespread airspace opacities in keeping with pneumonitis/pneumonia.      I personally discussed this study with GABBY WINSTON on 2/20/2024 11:42 AM.               Workstation performed: [id number]         XR chest portable ICU   Final Result by Igor Nieto MD (02/17 1309)      ET and NG tubes remain      Persistent interstitial edema.      Partial right base clearing pneumonitis with left base worsening.      The study was marked in EPIC for immediate notification.            Workstation performed: [id number]         XR chest portable ICU   Final Result by Joby Anne MD (02/14 0816)      Endotracheal tube  projects 4.2 cm above the tracee with nasogastric tube below the diaphragm. No significant change in right greater than left airspace disease.            Workstation performed: [id number]         XR chest portable   Final Result by Kathi Monique MD (02/12 0819)      No definite change with persistent extensive bilateral groundglass opacity.            Workstation performed: [id number]         XR chest portable   Final Result by Ravi Rivera MD (02/10 1255)      Mild congestive changes with bilateral underlying infiltrates.            Workstation performed: [id number]         CTA chest pe study   Final Result by Kathi Monique MD (02/09 1222)      No pulmonary embolus.      No significant change since the chest CT from 2/3/2024 in extensive multifocal bilateral groundglass opacity, slightly greater on the right, and mild peripheral consolidation in the right lung with extensive bronchial dilation which may be due to a    combination of COVID-19 and Rituxan induced pneumonitis/fibrosis.            Workstation performed: [id number]         CT chest wo contrast   Final Result by Kathi Monique MD (02/03 1740)      Persistent extensive multi focal bilateral groundglass opacity, stable on the right and slightly increased in the left upper lobe and improving right middle and right lower lobe consolidation since 1/25/2024. Persistent moderate multifocal bronchial    dilation. This could be the sequela of COVID-19 pneumonia but Rituxan induced pneumonitis cannot be excluded.         Workstation performed: [id number]         XR chest portable   Final Result by Kathi Monique MD (01/28 2052)      Persistent extensive patchy bilateral groundglass opacity and consolidation.               Workstation performed: [id number]         CTA chest pe study   Final Result by Kathi Monique MD (01/25 1620)      No pulmonary embolus.      Extensive bilateral groundglass opacity with multifocal  consolidation in the right lung, greatest in the right lower lobe, compatible with pneumonia.      Mild multifocal bronchial dilation which may be due to to developing pulmonary fibrosis, although in some cases of COVID-19 pneumonia, bronchial dilation is reversible and does not represent fibrosis.      Mild calcification of the left anterior descending coronary artery indicating atherosclerotic heart disease.      Hepatic steatosis.      Workstation performed: [id number]         XR chest pa & lateral   Final Result by Nessa Schafer MD (01/22 1538)      Continued improved aeration of the bilateral airspace opacities.                  Workstation performed: [id number]         XR chest portable ICU   Final Result by Nessa Schafer MD (01/17 0922)      Improved aeration of the bilateral airspace opacities.                     Workstation performed: [id number]         XR chest portable ICU   Final Result by Eduardo Monk MD (01/15 1648)      Worsening diffuse bilateral airspace opacities.                  Workstation performed: [id number]         XR chest portable ICU   Final Result by Julio C Alegre MD (01/15 0757)   Persistent bilateral multi lobar opacities.      Workstation performed: [id number]         XR chest portable ICU   Final Result by Julio C Alegre MD (01/12 0936)      Worsening left-sided pulmonary opacities concerning for multi lobar infiltrates.                  Workstation performed: [id number]         MRI brain seizure wo and w contrast   Final Result by Manuel Mandel MD (01/11 0048)      Previously visualized diffusion signal abnormality in the right basal ganglia is no longer identified and may have been artifactual given underlying susceptibility artifact in the bilateral globus pallidus.         Workstation performed: [id number]         XR chest portable ICU   Final Result by Cooper Cai MD (01/11 0849)      1. Persistence of bilateral hazy diffuse airspace opacities in the  lungs, without significant change, again suspicious for pneumonia   2. Nasogastric tube extends below left hemidiaphragm                        Workstation performed: [id number]         IR biopsy thyroid with molecular testing    (Results Pending)       Other Diagnostic Testing: I have personally reviewed pertinent reports.      ACTIVE MEDICATIONS     Current Facility-Administered Medications   Medication Dose Route Frequency    acetaminophen (TYLENOL) tablet 650 mg  650 mg Oral Q6H PRN    chlorhexidine (PERIDEX) 0.12 % oral rinse 15 mL  15 mL Mouth/Throat Q12H SARTHAK    [START ON 4/7/2024] enoxaparin (LOVENOX) subcutaneous injection 40 mg  40 mg Subcutaneous Q24H SARTHAK    fentaNYL injection 25 mcg  25 mcg Intravenous Q1H PRN    fluconazole (DIFLUCAN) (HIGH DOSE) IVPB 800 mg  800 mg Intravenous Q24H    insulin lispro (HumALOG/ADMELOG) 100 units/mL subcutaneous injection 1-5 Units  1-5 Units Subcutaneous Q6H SARTHAK    lacosamide (VIMPAT) injection 100 mg  100 mg Intravenous Q12H    levalbuterol (XOPENEX) inhalation solution 1.25 mg  1.25 mg Nebulization TID    [START ON 4/7/2024] methylPREDNISolone sodium succinate (Solu-MEDROL) injection 10 mg  10 mg Intravenous Daily    minocycline (DYNACIN) tablet 200 mg  200 mg Per NG Tube Q12H    modafinil (PROVIGIL) tablet 100 mg  100 mg Oral Daily    nystatin (MYCOSTATIN) powder   Topical BID    ondansetron (ZOFRAN) injection 4 mg  4 mg Intravenous Q6H PRN    pantoprazole (PROTONIX) injection 40 mg  40 mg Intravenous Q12H SARTHAK    piperacillin-tazobactam (ZOSYN) 4.5 g in sodium chloride 0.9 % 100 mL IVPB (EXTENDED INFUSION)  4.5 g Intravenous Q8H    polyethylene glycol (MIRALAX) packet 17 g  17 g Per NG Tube Daily    sodium chloride (AYR SALINE NASAL) nasal gel 1 Application  1 Application Nasal Q1H PRN    sodium chloride (OCEAN) 0.65 % nasal spray 2 spray  2 spray Each Nare BID    sodium chloride 3 % inhalation solution 4 mL  4 mL Nebulization TID    sulfamethoxazole-trimethoprim  (BACTRIM DS) 800-160 mg per tablet 1 tablet  1 tablet Per NG Tube Once per day on Monday Wednesday Friday       Prior to Admission medications    Medication Sig Start Date End Date Taking? Authorizing Provider   busPIRone (BUSPAR) 5 mg tablet Take 5 mg by mouth 2 (two) times a day    Historical Provider, MD   escitalopram (LEXAPRO) 10 mg tablet  5/10/21   Historical Provider, MD   ibuprofen (MOTRIN) 600 mg tablet Take 1 tablet (600 mg total) by mouth every 6 (six) hours as needed for mild pain for up to 10 days  Patient not taking: Reported on 5/16/2023 10/21/20 5/26/22  Mallory Floyd MD   lamoTRIgine (LaMICtal) 200 MG tablet Take 1 tablet (200 mg total) by mouth 2 (two) times a day 5/16/23   Jamari Nino MD   lamoTRIgine (LaMICtal) 200 MG tablet 1 tablet by mouth twice per day. 9/12/23   Jamari Nino MD   ondansetron (ZOFRAN-ODT) 4 mg disintegrating tablet Take 1 tablet (4 mg total) by mouth every 8 (eight) hours as needed for nausea or vomiting  Patient not taking: Reported on 11/9/2020 10/21/20 11/20/20  Mallory Floyd MD   topiramate (TOPAMAX) 100 mg tablet Take 1 tablet (100 mg total) by mouth 2 (two) times a day 5/16/23   Jamari Nino MD   topiramate (TOPAMAX) 100 mg tablet Take 1 tablet (100 mg total) by mouth 2 (two) times a day 9/12/23   Jamari Nino MD   venlafaxine (EFFEXOR-XR) 75 mg 24 hr capsule Take 75 mg by mouth daily 12/18/23 12/17/24  Historical Provider, MD   medroxyPROGESTERone acetate (DEPO-PROVERA SYRINGE) 150 mg/mL injection Inject 150 mg into a muscle every 3 (three) months  3/26/19 4/6/24  Historical Provider, MD       VTE Pharmacologic Prophylaxis: Per primary team  VTE Mechanical Prophylaxis: Per primary team    ==  Rolando Mcclure DO   [facility] Neurology Residency, PGY-IV

## 2024-04-06 NOTE — PROGRESS NOTES
"Progress Note -urology  Carla Hunt 59 y.o. female MRN: [id number]  Unit/Bed#: MICU 10 Encounter: [id number]    Assessment & Plan:    Urinary retention:  -Likely in the setting of critically ill condition.  -Status post urethral Ortiz catheter insertion on 3/27 volumes of 500.  -Maintain urethral Ortiz catheter at this time.  -Void trial at rehab  -Encourage ambulation  -Provide a good bowel regimen    Gross hematuria:  -Three-way urethral Ortiz catheter in place with minimal output patient currently on CRRT  -Manual irrigation revealed clear yellow urine.  No hematuria.  -Continue with manual irrigation as needed.  No need for CBI at this time.  -Patient may have outpatient evaluation of hematuria, although most likely source is thrombocytopenia.  Patient should have malignant causes ruled out.  -Most recent CT scan negative for  tract abnormalities.  -Repeat labs pending    Urology will follow peripherally      Subjective/Objective   Chief Complaint:   Subjective: Patient with trach and able to provide subjective information    Objective:     Blood pressure 105/56, pulse (!) 117, temperature (!) 96.3 °F (35.7 °C), resp. rate 20, height 5' 8\" (1.727 m), weight 78.1 kg (172 lb 2.9 oz), SpO2 98%.,Body mass index is 26.18 kg/m².      Intake/Output Summary (Last 24 hours) at 4/6/2024 0330  Last data filed at 4/6/2024 0100  Gross per 24 hour   Intake 1235.98 ml   Output 2674 ml   Net -1438.02 ml       Invasive Devices       Peripheral Intravenous Line  Duration             Peripheral IV 04/01/24 Right;Ventral (anterior) Forearm 4 days    Peripheral IV 04/05/24 Right Antecubital <1 day              Hemodialysis Catheter  Duration             HD Temporary Double Catheter 1 day              Drain  Duration             Ileostomy RUQ 45 days    Urethral Catheter Three way 18 Fr. 5 days    NG/OG/Enteral Tube Nasogastric 18 Fr Right nare 2 days              Airway  Duration             Surgical Airway Naveen Cuffed 24 " days                  Physical Exam  Constitutional:       General: She is not in acute distress.     Appearance: She is normal weight. She is ill-appearing and toxic-appearing.   HENT:      Head: Normocephalic and atraumatic.      Right Ear: External ear normal.      Left Ear: External ear normal.      Mouth/Throat:      Pharynx: Oropharynx is clear.   Eyes:      General: No scleral icterus.     Conjunctiva/sclera: Conjunctivae normal.   Cardiovascular:      Rate and Rhythm: Regular rhythm. Tachycardia present.      Pulses: Normal pulses.   Pulmonary:      Comments: Trach  Abdominal:      General: There is no distension.      Tenderness: There is no abdominal tenderness.   Genitourinary:     Comments: Clear yellow urine minimal output  Musculoskeletal:      Cervical back: Normal range of motion.   Skin:     General: Skin is warm and dry.   Neurological:      Comments: Trach           Lab, Imaging and other studies:CBC:   Lab Results   Component Value Date    WBC 12.85 (H) 04/05/2024    HGB 9.3 (L) 04/05/2024    HCT 28.5 (L) 04/05/2024    MCV 91 04/05/2024    PLT 57 (L) 04/05/2024    RBC 3.15 (L) 04/05/2024    MCH 29.5 04/05/2024    MCHC 32.6 04/05/2024    RDW 19.7 (H) 04/05/2024    MPV 14.3 (H) 04/05/2024    NRBC 2 04/05/2024   , CMP:   Lab Results   Component Value Date    SODIUM 138 04/06/2024    K 4.4 04/06/2024     04/06/2024    CO2 19 (L) 04/06/2024    BUN 43 (H) 04/06/2024    CREATININE 0.66 04/06/2024    CALCIUM 10.1 04/06/2024    AST 20 04/05/2024    ALT 32 04/05/2024    ALKPHOS 346 (H) 04/05/2024    EGFR 97 04/06/2024     VTE Pharmacologic Prophylaxis: Reason for no pharmacologic prophylaxis defer to primary team, hematuria  VTE Mechanical Prophylaxis: sequential compression device    Namrata Hauser PA-C

## 2024-04-06 NOTE — CONSULTS
Patient has previously been evaluated by neurology please see progress note dated today for updated information.

## 2024-04-06 NOTE — CONSULTS
e-Consult (IPC)  - Interventional Radiology  Carla Hunt 59 y.o. female MRN: [id number]  [facility] [id number]          Interventional Radiology has been consulted to evaluate Carla Hunt    We were consulted by Dr. Cornelius concerning this patient with airway compromise.    Inpatient Consult to IR  Consult performed by: Shahriar Betancur MD  Consult ordered by: Britany Cornelius DO        04/06/24    Assessment/Recommendation:   58 yo female with enlarging left thyroid c/w 1/2024 which previously showed uniform enhancement on CT now with hypovascular mass occupying the lobe which is compressing the airway and there is concern for lymphoma given her history.     Will attempt bedside thyroid biopsy;however, this will be challenging given the critical state of the patient and the presence of a tracheotomy.    >5 min, >50% time spent reviewing/analyzing record, written report will be generated in the EMR.     Thank you for allowing Interventional Radiology to participate in the care of Carla Hunt. Please don't hesitate to call or TigerText us with any questions.     Shahriar Betancur MD

## 2024-04-06 NOTE — PROGRESS NOTES
"Progress Note  Carla Hunt 59 y.o. 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, anticipated poor wound healing on prolonged high dose steroids and neutropenia; candidemia with noted UGI bleed s/p EGD with esophageal ulcerations per GI  S/p Or 4/4 for midline wound exploration with mucus fistula and end ileostomy viable appearing and above the fascia, noted nonviable subq tissue debrided and skin bridge between ostomy and midline incision removed, small area of exposed bowel protected and stoma isolated     Tachy to the 130's  Tachypnic to the 20's   cc  CVVH running -100  Vent settings rate 16, , Peep 6, FiO2 40%    Plan:  Change midline dressing this AM  Plan to proceed with BID dressing changes  Appreciate ICU level of care    Subjective/Objective   No acute events overnight.  Patient resting comfortably on the mechanical ventilation.  No complaints of nursing staff.  Will need to change dressing today.      /58   Pulse (!) 124   Temp 99.1 °F (37.3 °C)   Resp (!) 24   Ht 5' 8\" (1.727 m)   Wt 82.9 kg (182 lb 12.2 oz)   SpO2 100%   BMI 27.79 kg/m²     Physical Exam:  General - frail, intubated / trach  CV - warm, regular rate  Pulm - no respiratory distress on vent  Abd - soft, nondistended, wound manager overlying ostomy with dark black stool, some leakage from the wound manager.      Recent Labs     04/04/24  0118 04/04/24  0425 04/04/24  0752 04/04/24  1802 04/05/24  0522 04/05/24  1158 04/05/24  1841 04/05/24  2050 04/06/24  0100 04/06/24  0100 04/06/24  0503 04/06/24  0813   WBC 11.43*  --  12.73*   < > 13.25*  --   --  12.85*  --   --   --  14.57*   HGB 8.0*  --  7.9*   < > 9.4*  9.4*  --   --  " 9.3*  --   --   --  8.2*   PLT 70*  --  68*   < > 57*  --   --  57*  --   --   --  58*   SODIUM 146   < >  --    < > 141 140   < >  --   --  138 137  --    K 3.9   < >  --    < > 4.6 4.4   < >  --   --  4.4 4.8  --    *   < >  --    < > 108 109*   < >  --   --  105 104  --    CO2 18*   < >  --    < > 21 20*   < >  --   --  19* 20*  --    *   < >  --    < > 76* 65*   < >  --   --  43* 40*  --    CREATININE 1.65*   < >  --    < > 1.07 0.88   < >  --   --  0.66 0.62  --    GLUC 185*   < >  --    < > 92 98   < >  --   --  140 144*  --    CALCIUM 10.3*   < >  --    < > 10.0 10.0   < >  --   --  10.1 10.0  --    MG  --    < >  --    < > 2.1 2.0   < >  --  2.2  --  2.0  --    PHOS  --    < >  --    < > 5.1* 4.6*   < >  --  4.1  --  3.9  --    AST  --   --  21  --  20  --   --   --   --   --   --   --    ALT  --   --  34  --  32  --   --   --   --   --   --   --    ALKPHOS  --   --  381*  --  346*  --   --   --   --   --   --   --    TBILI  --   --  1.46*  --  1.42*  --   --   --   --   --   --   --    EGFR 33   < >  --    < > 57 72   < >  --   --  97 99  --    PTT 40*  --   --   --   --   --   --   --   --   --   --   --    INR 1.92*   < >  --   --   --  1.63*  --   --   --   --   --  1.45*    < > = values in this interval not displayed.

## 2024-04-06 NOTE — RESPIRATORY THERAPY NOTE
RT Ventilator Management Note  Carla Hunt 59 y.o. female MRN: [id number]  Unit/Bed#: [facility] 10 Encounter: [id number]      Daily Screen         4/5/2024  0735 4/6/2024  0800          Patient safety screen outcome:: Failed Failed      Not Ready for Weaning due to:: Underline problem not resolved Underline problem not resolved                Physical Exam:   Assessment Type: Assess only  General Appearance: Sleeping  Respiratory Pattern: Assisted  Chest Assessment: Chest expansion symmetrical  Bilateral Breath Sounds: Coarse, Diminished      Resp Comments: Pt received on APVcmv. Tolerating well. No changes at this time. RT will cont to monitor.     04/06/24 0800   Respiratory Assessment   Resp Comments Pt received on APVcmv. Tolerating well. No changes at this time. RT will cont to monitor.   Vent Information   Vent ID [id number]   Vent type Summers G5   Summers Vent Mode APVcmv   APVcmv Settings   Resp Rate (BPM) 16 BPM   VT (mL) 450 mL   Insp Time (sec) 0.7 sec   FIO2 (%) 40 %   PEEP (cmH2O) 6 cmH2O   I:E Ratio 1/4.7   Insp Resistance 1   P-ramp (ms) 100 (ms)   Flow Trigger (LPM) 3 LPM   Humidification Heater   Heater Temp 95 °F (35 °C)   APVcmv Actuals   Resp Rate (BPM) 22 BPM   VT (mL) 441 mL   MV (Obs) 9.6   MAP (cmH2O) 7.7 cmH2O   Peak Pressure (cmH2O) 17 cmH2O   I:E Ratio (Obs) 1/2.6   Static Compliance (mL/cmH20) 34 mL/cmH2O   Heater Temperature (Obs) 95 °F (35 °C)   APVcmv ALARMS   High Peak Pressure (cmH2O) 45 cmH2O   Low Peak Pressure (cmH2O) 5 cm H2O   High Resp Rate (BPM) 40 BPM   High MV (L/min) 25 L/min   Low MV (L/min) 5 L/min   High VT (mL) 1000 mL   Low VT (mL) 200 mL   Apnea Time (s) 20 S   Maintenance   Alarm (pink) cable attached No   Resuscitation bag with peep valve at bedside Yes   Water bag changed Yes   Circuit changed No   Daily Screen   Patient safety screen outcome: Failed   Not Ready for Weaning due to: Underline problem not resolved   IHI Ventilator Associated Pneumonia Bundle   Daily  Assessment of Readiness to Extubate Yes   Head of Bed Elevated HOB 30   Oral Care Debriding/Hydrogen Peroxide solution;Mouth swabbed;Mouth suctioned;with chlorhexidine   Surgical Airway Shiley Cuffed   Placement Date/Time: 03/12/24 1200   Tube Size: 8 mm  Type: Tracheostomy  Brand: Naveen  Style: Cuffed   Status Cuff Inflated;Secured   Surgical Airway Cuff Pressure (color) Green   Equipment at bedside BVM;Wall Suction setup;Additional complete same size trach tube;Additional complete one size smaller trach tube;Obturator;Additional inner cannula;Sterile saline

## 2024-04-06 NOTE — ASSESSMENT & PLAN NOTE
Assessment:  MRI 4/6/2024 noted multiple small bilateral foci of acute infarcts. No significant edema, mass effect, or hemorrhagic transformation.  Neurology was consulted due to this finding.    MRI 4/10/24: As seen on the MRI from 4 days ago, there are small scattered infarcts within the cerebral hemispheres bilaterally suggesting a central embolic source. Small new infarcts are seen within the left inferior parietal lobe with a small acute hemorrhage noted, as seen on yesterday's CT scan. Findings are suggestive of additional small infarcts with focal hemorrhagic transformation. Stable postoperative appearance of the middle cranial fossa on the left status post temporal lobectomy. Complete opacification of the mastoid temporal bones bilaterally is again seen.    Lab Results   Component Value Date    HGBA1C 6.6 (H) 01/09/2024     Lab Results   Component Value Date    CHOLESTEROL 118 01/09/2024    TRIG 331 (H) 03/13/2024    HDL 40 (L) 01/09/2024    LDLCALC 59 01/09/2024     Impression: Strokes are embolic in nature.  Atrial fibrillation as a cause can be ruled out as the patient has been on telemetry monitoring for practically her whole hospitalization.  The patient's cause of emboli are currently unknown, given the infectious processes the patient currently has septic emboli are high on the differential however would not rule out these being caused by a PFO and a VTE event.  The patient's altered mental status is not due to her strokes as the stroke burden is very minimal.    Plan:  - Recommended SATURNINO, patient possibly getting it on 4/11/24  - Recommend getting a CTA Head w contrast to assess for mycotic aneurysms  - At this time would not recommend initiation of aspirin given the strong suspicion for septic emboli and the risks that these would cause bleeding.  - Continue Neuro checks  - Maintain glucose <180, SSI for coverage if indicated  - Repeat CTH if GCS drops 2pts in 1hr  - Atorvastatin 40 mg q.d.  -  Continue monitoring on telemetry  - Rest of care per primary team

## 2024-04-06 NOTE — PROCEDURES
Surgery  Carla Hunt 59 y.o. female MRN: [id number]  [facility] Encounter: [id number]     Wound Check Progress Note     Location of wound:   abdomen  See images below     Prior dressing was removed.  Images were taken and inserted into the patient's chart.    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 Adaptic was placed in the wound bed. White foam was placed in the wound bed base and laterally around the bottle cap to protect the skin edge. Black foam was placed inside the wound which measured 12 x 12 x 5 cm.    A total of three piece of black foam and three of white foam were utilized. The vac was connected to -50 suction. An ostomy appliance was placed overlying the ostomy.     Patient's spouse and nursing were updated.  Patient tolerated procedure well.    Images:            Nancy Pond MD  4/6/2024

## 2024-04-06 NOTE — PLAN OF CARE
Problem: Prexisting or High Potential for Compromised Skin Integrity  Goal: Skin integrity is maintained or improved  Description: INTERVENTIONS:  - Identify patients at risk for skin breakdown  - Assess and monitor skin integrity  - Assess and monitor nutrition and hydration status  - Monitor labs   - Assess for incontinence   - Turn and reposition patient  - Assist with mobility/ambulation  - Relieve pressure over bony prominences  - Avoid friction and shearing  - Provide appropriate hygiene as needed including keeping skin clean and dry  - Evaluate need for skin moisturizer/barrier cream  - Collaborate with interdisciplinary team   - Patient/family teaching  - Consider wound care consult   Outcome: Progressing     Problem: SAFETY ADULT  Goal: Patient will remain free of falls  Description: INTERVENTIONS:  - Educate patient/family on patient safety including physical limitations  - Instruct patient to call for assistance with activity   - Consult OT/PT to assist with strengthening/mobility   - Keep Call bell within reach  - Keep bed low and locked with side rails adjusted as appropriate  - Keep care items and personal belongings within reach  - Initiate and maintain comfort rounds  - Make Fall Risk Sign visible to staff  - Offer Toileting every 2 Hours, in advance of need  - Initiate/Maintain bed alarm  - Obtain necessary fall risk management equipment: non skid footwear  - Apply yellow socks and bracelet for high fall risk patients  - Consider moving patient to room near nurses station  Outcome: Progressing     Problem: RESPIRATORY - ADULT  Goal: Achieves optimal ventilation and oxygenation  Description: INTERVENTIONS:  - Assess for changes in respiratory status  - Assess for changes in mentation and behavior  - Position to facilitate oxygenation and minimize respiratory effort  - Oxygen administered by appropriate delivery if ordered  - Initiate smoking cessation education as indicated  - Encourage  broncho-pulmonary hygiene including cough, deep breathe, Incentive Spirometry  - Assess the need for suctioning and aspirate as needed  - Assess and instruct to report SOB or any respiratory difficulty  - Respiratory Therapy support as indicated  Outcome: Progressing     Problem: SKIN/TISSUE INTEGRITY - ADULT  Goal: Pressure injury heals and does not worsen  Description: Interventions:  - Implement low air loss mattress or specialty surface (Criteria met)  - Apply silicone foam dressing  - Instruct/assist with weight shifting every 30 minutes when in chair   - Limit chair time to 2 hour intervals  - Use special pressure reducing interventions    - Apply fecal or urinary incontinence containment device   - Perform passive or active ROM  - Turn and reposition patient & offload bony prominences every 2 hours   - Utilize friction reducing device or surface for transfers   - Consider consults to  interdisciplinary teams  - Use incontinent care products after each incontinent episode  - Consider nutrition services referral as needed  Outcome: Progressing     Problem: Potential for Falls  Goal: Patient will remain free of falls  Description: INTERVENTIONS:  - Educate patient/family on patient safety including physical limitations  - Instruct patient to call for assistance with activity   - Consult OT/PT to assist with strengthening/mobility   - Keep Call bell within reach  - Keep bed low and locked with side rails adjusted as appropriate  - Keep care items and personal belongings within reach  - Initiate and maintain comfort rounds  - Make Fall Risk Sign visible to staff  - Offer Toileting every 2 Hours, in advance of need  - Initiate/Maintain bed alarm  - Obtain necessary fall risk management equipment: non skid footwear  - Apply yellow socks and bracelet for high fall risk patients  - Consider moving patient to room near nurses station  Outcome: Progressing     Problem: Nutrition/Hydration-ADULT  Goal: Nutrient/Hydration  intake appropriate for improving, restoring or maintaining nutritional needs  Description: Monitor and assess patient's nutrition/hydration status for malnutrition. Collaborate with interdisciplinary team and initiate plan and interventions as ordered.  Monitor patient's weight and dietary intake as ordered or per policy. Utilize nutrition screening tool and intervene as necessary. Determine patient's food preferences and provide high-protein, high-caloric foods as appropriate.     INTERVENTIONS:  - Monitor oral intake, urinary output, labs, and treatment plans  - Assess nutrition and hydration status and recommend course of action  - Evaluate amount of meals eaten  - Assist patient with eating if necessary   - Allow adequate time for meals  - Recommend/ encourage appropriate diets, oral nutritional supplements, and vitamin/mineral supplements  - Order, calculate, and assess calorie counts as needed  - Recommend, monitor, and adjust tube feedings and TPN/PPN based on assessed needs  - Assess need for intravenous fluids  - Provide specific nutrition/hydration education as appropriate  - Include patient/family/caregiver in decisions related to nutrition  Outcome: Progressing     Problem: CARDIOVASCULAR - ADULT  Goal: Absence of cardiac dysrhythmias or at baseline rhythm  Description: INTERVENTIONS:  - Continuous cardiac monitoring, vital signs, obtain 12 lead EKG if ordered  - Administer antiarrhythmic and heart rate control medications as ordered  - Monitor electrolytes and administer replacement therapy as ordered  Outcome: Progressing     Problem: GASTROINTESTINAL - ADULT  Goal: Maintains or returns to baseline bowel function  Description: INTERVENTIONS:  - Assess bowel function  - Encourage oral fluids to ensure adequate hydration  - Administer IV fluids if ordered to ensure adequate hydration  - Administer ordered medications as needed  - Encourage mobilization and activity  - Consider nutritional services  referral to assist patient with adequate nutrition and appropriate food choices  Outcome: Progressing     Problem: GENITOURINARY - ADULT  Goal: Maintains or returns to baseline urinary function  Description: INTERVENTIONS:  - Assess urinary function  - Encourage oral fluids to ensure adequate hydration if ordered  - Administer IV fluids as ordered to ensure adequate hydration  - Administer ordered medications as needed  - Offer frequent toileting  - Follow urinary retention protocol if ordered  Outcome: Progressing     Problem: METABOLIC, FLUID AND ELECTROLYTES - ADULT  Goal: Glucose maintained within target range  Description: INTERVENTIONS:  - Monitor Blood Glucose as ordered  - Assess for signs and symptoms of hyperglycemia and hypoglycemia  - Administer ordered medications to maintain glucose within target range  - Assess nutritional intake and initiate nutrition service referral as needed  Outcome: Progressing     Problem: HEMATOLOGIC - ADULT  Goal: Maintains hematologic stability  Description: INTERVENTIONS  - Assess for signs and symptoms of bleeding or hemorrhage  - Monitor labs  - Administer supportive blood products/factors as ordered and appropriate  Outcome: Progressing

## 2024-04-06 NOTE — PROGRESS NOTES
[facility]  Progress Note: Critical Care  Name: Carla Hunt 59 y.o. 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; complicated by poor wound healing s/p OR 4/4 with surgically created mucus fistula and end ileostomy  Hyperphosphatemia in setting of #9; improving on CRRT  ELIESER on CKD3; improving on CRRT  Esophagitis; on PPI  Bilateral ground glass opacities, persistent, improving; persistent COIVD infection now resolved   Pancytopenia- multifactorial including hx b-cell lymphoma, persistent inflammation, s/p granix x3  Acute on chronic anemia- multifactorial-intermittent blood loss from ostomy site, chronic inflammation, iatrogenic, marrow dysfunction in setting of persistent inflammation; requiring intermittent prbc's  Lymphopenia with bandemia, in setting of high dose corticosteroids, improving s/p granix x3  Oropharyngeal dysphagia, likely 2/2 multiple prolonged intubations; awaiting NMES  Cutaneous paratracheal ulceration  Gross hematuria, intermittent  B-Cell lymphoma, s/p chemotheraphy 2022, Rituxan maintenance therapy on hold  Seizure disorder; on vimpat  Steroid induced hyperglycemia;on insulin gtt  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  Hx of complex migraines s/p L temporal lobectomy 2007 complicated by below  Hx of complex seizures in setting of #17, on AEDs  COVID-19 infection POA; resolved; s/p multiple courses of antivirals 2/2 persistent infection,  most recent  Remdesivir course completed 3/15, superimposed by recurrent PNA s/p multiple courses Abx  Minimal SAH POA, no interventions required, resolved on repeat CTH     Neuro:  Diagnosis: Alertness  -Resume modafinil 100mg qd   -Delirium precautions  Diagnosis: Analgesia  -PRN Fentanyl 25mcg/hr; on holiday for frequent neurochecks     Diagnosis: Metabolic encephalopathy; POA, improving  -Waxing and waning neuro exam since admission  -Most recent repeat CTH 3/13 negative for acute intracranial findings.  Has had extensive neurological workup including MRI/CT neuroimaging, LP with unremarkable findings  -Now remains off sedation. Electrolytes, sodium, Hyperglycemia 2/2 high dose steroids requiring insulin gtt. Ammonia normal. May be prolonged 2/2 PNA, possibly worsened by uremic encephalopathy worsened by ELIESER now resolved, uremia improving; candida growth seen on BAL Cx from 3/11, may be respiraotry colonization, however currently without signs of systemic fungal infection but is at high risk 2/2 lymphopenia, depressed immunoglobulins in setting of B cell lymphoma and high dose corticosteroids.   3/31- More obtunded, mentation worsening. STAT CTH w no new changes.   -Bcx 2/2 (3/31) growing fungemia, identified as candida albicans  -Bedside EGD x2 (4/2,4/3) Ulcerated mucosa in the upper third of the esophagus without evidence of hemorrhage   -Cryptococcal ag negative  -Aspergillus galactomannan ag negative  -Tb quantiferon gold indeterminate 2/2 lymphopenia     Plan:  -Aggressive steroid wean: currently IV Solumedrol 20mg x1, followed by 10mg x1 on 4/7, the stop thereafter   -Continue CRRT to correct uremia, electrolyte derangements   -Follow up serial blood cultures  -Obtain MRI brain   -S/P 14d Remdesivir course to tx COVID-19, completed 3/15  -S/P 14d Minocycline course to tx stenotrophomonas PNA, completed 3/27   -Continue modafinil as above  -Avoid PRN fentanyl/sedative meds as able.  -Delirium precautions  -Frequent  neurochecks  -Defer MRI brain due to clinical improvement, not likely to      Diagnosis: seizure disorder, known history  -S/p partial left temporal lobe resection in 2007 2/2 complex migraines  -On Lamictal 150 bid and Topamax 50mg bid at home  -Last lamotrigine level from 03/02 at 10.7 (therapeutic)  -Home Lamictal switched to Vimpat 3/27 due to worsening pancytopenia, neuro following  -Continue Vimpat 100mg bid maintenance dose  -Continue home topiramate 50mg bid  -Seizure precautions      Diagnosis: Anxiety, known history  -On Effexor 12.5 mg TID     CV:  -Diagnosis: Sinus tachycardia  -TTE 3/17 with no major structural dysfunction  -Multifactorial 2/2 deconditioning, dehydration/hypvol, acute on chronic anemia, underlying infectious/inflammatory process, pain, Modafinal-induced  TTE 4/1 with EF 70%, no WMA, no changes from prior  Plan:  - See plans under Pulm, Heme/Onc, ID, MSK    Pulm:  Diagnosis: Acute hypoxic respiratory failure;  Diagnosis: Bilateral ground glass opacities, improving  -Vent dependent; s/p trach 3/12 after was reintubated 3/11 due to increased WOB  -Persistently COVID+ since admission s/p multiple courses of antivirals (most recent completed 3/15), complicated by recurrent bacterial PNA treated w 10d levaquin (completed 2/25) for MDR PNA; most recent BAL +recurrent stenotrophomona treated with Bactrim, swithced to minocyline 14d course completed 3/28.  -Etiology Likely multifactorial including possible COVID pneumonitis vs recurrent PNA vs hypersensitivity pneumonitis 2/2 ?rituxumab. Persistent inflammation likely given patient has been steroid responsive throughout admission.   -CRP increasing but likely from intraabdominal inflammation as patient is noted to have clinical improvement with weaning of steroids, ABx.   -Repeat CXR 3/22 with significant improvement of multifocal PNA. 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 2/2 poor wound healing midline incision  -Poor wound healing likely due to high dose steroid therapy s/p wound vac that was removed 3/17 by gen surg now s/p s/p OR 4/5 with surgically created mucus fistula and end ileostomy  Plan:  -Wound vac as per general surgery  -Monitor ostomy pouch output  -Continue Zosyn in setting of fecal contamination of wound prior to OR intervention; ID following      Diagnosis: oropharyngeal dysphagia   -Has had multiple and prolonged intubations now s/p trach   -VBS 3/27 oropharyngeal dysphagia  -Speech therapy to begin neuromuscular electrical stim/NMES/VitalStim pending off ventilation. TT speech therapist when indicated.    -Continue tube feeds for now until potential PEG placement pending abdominal wound healing as per Gen Surg    Diagnosis: Esophagitis   Acute on chronic anemia associated bloody NG output on 4/3  Uremic, worsening suggestive of UGIB given relatively stable Cr  Bedside EGD x2 (4/2,4/3) Ulcerated mucosa in the upper third of the esophagus without evidence of hemorrhage   Plan:  Follow up HSV/CMV/candida from GI sample, pending  Continue PPI IV bid  Monitor NGT output, stool output    :  Diagnosis: Uremia  -?catabolic from steroids, may also have ?slow UGIB in setting of prolonged steroids though no overt s/s of GIB and H&H stable since 3/23 and patient is on ppi  -Trending up   -EGD without active source of bleedig   -FEurea  suggestive of intrinsic pathology   -Aggressive steroid wean as above  -Improving with CRRT  -BMP q6h      Diagnosis: ELIESER on CKD III,   -Baseline Cr  0.8-1.2  -Cr now stable and at baseline.   -UO adequate, on Ortiz, draining clear yellow urine  -Prerenal azotemia 2/2 uremia,   Plan:  -Trend daily BMP  -Continue to monitor I/O and UOP  -Avoid nephrotoxins, contrast dye as able  -Improving with CRRT     Diagnosis: Hyperphosphatemia:  -Phos elevated 6s likely 2/2 ELIESER/reduced excretion.   -Vitamin D/PTH wnl.   -Improving with CRRT    F/E/N:  F: on CRRT  E: monitor and replete for goal K>4, P>3, Mg>2  N: tube feeds with vital 1.5; 45 cc/h, Prosource liquid protein to 1 packet BID, Refugio BID to support wound healing       Heme/Onc:  Diagnosis: Pancytopenia, improving with intermittent plt/prbc's transfusion, s/p Filgastrim x3  -In setting of hx of B cell lymphoma, prior chemo, Rituxan therapy, prior abx and present meds including lamictal  -Hemo onc consulted, empirically given Filgastrim 300mg qd x3d (complated 3/30); IR bone marrow bx deferred by heme-onc   -Lamictal switched to Vimpat in consultation with neuro as above due to potential contribution to pancytopenia  -Trend CBC and diff daily, neutropenic precautions for ANC <500    Diagnosis: Acute on chronic anemia  -Baseline Hgb ~7-8. S/P 2U PRBCs 3/17 after repeat Hgb 5.3, total of 6U transfuse  d since admission.  -Patient had significant blood loss from ostomy site 3/20, resulting in hemorraghic shock, improved with blood transfusion; hemostasis achieved after bleeding source identified and sutured. Another large vol danie bloody output from osotomy on 3/21, bleeding source within ostomy site, sutured.    -Also having intermittent vaginal bleeding  -EGD 4/3,4/4 without active bleeding  -?Worsened by impaired marrow response liekly 2/2 persistent inflammation due to low retic index ~1 prior to most recent transfusions; normocytic with normal B12/folate levels;  MCV<100. Iatrogenic cause likely contributing 2/2 frequent blood draws; chronic anemia likely persistent inflammatory state/CKD/lymphoma in setting of elevated ferritin of 974. SPEP/UPEP negative. Hemolysis workup negative.   Plan:  -Routine monitoring ostomy output, if bleeding, apply direct pressure and contact gen surg STAT for hemostasis .  -Continue tube feeds/nutritional support  -Give FFP/vitamin K to correct INR as indicated  -Trend CBC, transfuse Transfuse with leukoreduced and irradiated RBCs to maintain Hgb>7  -See plan under pancytopenia     Diagnosis: Thrombocytopenia  -Likely multifactorial including imparied production from marrow dysfunction in setting of persistent inflammation; RI low suggestive of hyperproliferation, hx of B-cell lymphoma, recent infections including persistent COVID, PNA treated, CMV negative. No known history of vWD/congenital disorders. No liver dysfunction; recent hepatic profile unremarkable. HIT workup negative, Hemolytic workup negative. No s/s DIC. No mechanical valves. ?Primary ITP.  Plan:  -Filgrastim 300mg x3 to aid in plt recovery  -Transfuse with leukoreduced and irradiated PLTs if count <20k due to increased risk of bleeding   -Goal >50k if bleeding; goal >20K if no bleeding  -Hold Lovenox for DVT ppx if Plt<50k.  -See plan under pancytopenia      Diagnosis Lymphopenia with bandemia  -In setting of high dose steroids  -Severely depressed immunoglobulins inclding IgG, IgA, IgM  -At risk for further infections  -Filgrastim 300mg x3 to aid in plt recovery  -Contact and airborne precautions    Diagnosis: B cell lymphoma  -Follows with heme[facility]onc at [facility]  -S/P 6 cycles of chemotherapy in 20222  -Maintained on Rituxan for 2 year course PTA, started May 2022, last dose was 12/2024, treatment currently on hold in setting of persistent COVID-19 infection  Anti-Ritux Ab negative  Plan:  -Holding rituximab in setting of persistent COVID-19 infection, now resolved.  ?resume  rituxubmab pending clinical stability & anti-ritux ab status in consultation with heme-onc     DVT ppx: Lovenox     Endo:  Diagnosis: steroid hyperglycemia and diabetes mellitus type 2, A1c at 6.6 from 01/2024  -Goal -180  -SSI    Diagnosis: R thyroid gland enlargement  -Seen on CT 4/5  -Follow up US 4/5- Limited exam due to overlying tracheostomy tube and central line catheter. Heterogeneous lobular appearance to the remaining thyroid gland, nonspecific, question sequela of thyroiditis. No discrete nodule identified.   T4/TSH unremarkable   Plan: IR consulted for FNA      ID:  Diagnosis: fungemia   Bcx x2 (3/31) growing yeast, ID panel- candida albicans  In setting of severe lymphopenia and severely depressed immunoglobulins   TTE already done 4/1, no new changes  Ophtho consulted, no evidence of ophthalmic endophthalmitis   Plan:   Hold further micofungin doses as per ID (d/c'd 4/5)  Continue fluconazole 800 mg qd (ELIESER improving on CRRT)  Avoid broad spectrum abx as it can contribute to fungal growth, however requiring Zosyn in setting of fecal contamination of woundd  Central/midlines changed after first positive Bcx for fungemia   F/U Repeat serial Bcx (q48h) to ensure clearance    ID following    Diagnosis: PCP ppx  On IV bactrim DS 2/2 prolonged high dose steroid therapy     Diagnosis: Recurrent bacterial pneumonia  - Patient has completed multiple treatment courses of remdesivir throughout hospitalization in addition to 7 days of ceftriaxone.  - BAL cultures in 2/2024 positive for MDR Pseudomonas and stenotrophomonas treated with 10d course of Levaquin  - CT C/A/P 3/10 with persistent groundglass opacities and changes suggestive of sequelae of COVID, prior infections.  Completed 10d course of cefepime for broad spectrum coveragge (completed (3/13)  -Repeat BAL cultures from 3/11 showing 4+ growth Stenotrophomonas maltophilia. Could be colonization given prior BAL growth of same, however due to recent  intubation with prolonged corticosteroid theraphy, will begin treating as true pathogen. Patient otherwise remains afebrile, no leukocytosis. CRP with down trending.  Previously on Bactrim from 3/13 to 3/18, discontinued due to worsening ELIESER  Plan:  -S/P 14d Minocycline course to tx stenotrophomonas PNA, completed 3/27   -IV steroids as above  -Continue minocycline sputum Cx growing steno (3/31)       MSK/Skin:  Diagnosis: Midline incision wound with poor wound healing-  -General surgery performed staple removal of the patient's midline incision on 3/12 with some skin signs appreciated at the inferior aspect of the wound without obvious pus drainage. Overnight 3/13, wound dehiscence progressed requiring general surgery reevaluation. Red/brown aspirate noted, fascia intact. Wet-to-dry dressings in place. General surgery following for next Epson wound care.  -Poor wound healing in setting of high-dose steroid therapy.  No  exam no obvious signs of infection at this time.   -S/P wound vac replacement 3/13 as per gen surgery. No active concerns for cellulitis.  Plan:  -Wound VAC management as per general surgery  -Wound care for peritracheostomy wound  -Abdominal wound care as per general surgery  -Routine monitoring     Diagnosis: Critical illness myopathy  -Likely exacerbated by high-dose steroid therapy  Plan:  -Continue to wean steroids as above  -Aggressive PT/OT once clinically stable        Disposition: Critical care    ICU Core Measures     Vented Patient  VAP Bundle  VAP bundle ordered     A: Assess, Prevent, and Manage Pain Has pain been assessed? Yes  Need for changes to pain regimen? NA   B: Both Spontaneous Awakening Trials (SATs) and Spontaneous Breathing Trials (SBTs) Plan to perform spontaneous awakening trial today? N/A   Plan to perform spontaneous breathing trial today? N/A   Obvious barriers to extubation? NA   C: Choice of Sedation RASS Goal: N/A patient not on sedation  Need for changes to sedation  or analgesia regimen? NA   D: Delirium CAM-ICU: Negative   E: Early Mobility  Plan for early mobility? Yes   F: Family Engagement Plan for family engagement today? Yes       Antibiotic Review: Patient on appropriate coverage based on culture data.  and Infectious disease consulted    Review of Invasive Devices:    Ortiz Plan: voiding trial today        Prophylaxis:  VTE Contraindicated secondary to: Plt <50   Stress Ulcer  covered bypantoprazole (PROTONIX) injection 40 mg [[id number]]        Significant 24hr Events       Overnight: ·   Given albumin 12.5%(250cc) for vol resusc due to MAP 60, hypotension. H&H stable. No other events.        Subjective   Unable to obtain ROS as below    Review of Systems   Unable to perform ROS: Acuity of condition        Objective                            Vitals I/O      Most Recent Min/Max in 24hrs   Temp 99.1 °F (37.3 °C) Temp  Min: 95.4 °F (35.2 °C)  Max: 99.5 °F (37.5 °C)   Pulse (!) 124 Pulse  Min: 112  Max: 133   Resp (!) 24 Resp  Min: 15  Max: 33   /58 BP  Min: 81/51  Max: 140/67   O2 Sat 100 % SpO2  Min: 96 %  Max: 100 %     Vent: APVcmv  16/450/peep6/40%    LDA  Peripherals x3, HD cath (R)   Ileostomy RUQ, draining bilious o/p  Wound vac  NGT   Ortiz  Surgical airway , cuffed       Intake/Output Summary (Last 24 hours) at 4/6/2024 0813  Last data filed at 4/6/2024 0700  Gross per 24 hour   Intake 1562.32 ml   Output 2970 ml   Net -1407.68 ml       Diet NPO    Invasive Monitoring             Physical Exam   Physical Exam  Eyes:      Pupils: Pupils are equal, round, and reactive to light.   Skin:     General: Skin is warm and dry.      Findings: Wound present.      Comments: Abd wound covered in packed dressing   HENT:      Nose: No epistaxis.      Mouth/Throat:      Mouth: Mucous membranes are moist.   Neck:      Trachea: Tracheostomy present.   Cardiovascular:      Rate and Rhythm: Regular rhythm. Tachycardia present.      Heart sounds: No murmur  heard.  Musculoskeletal:         General: No swelling.      Right lower leg: No edema.      Left lower leg: No edema.   Abdominal: General: An ostomy site is present. There is distension and ostomy site.     Palpations: Abdomen is soft.      Tenderness: There is no abdominal tenderness.      Comments: Wound vac   Constitutional:       Appearance: She is ill-appearing.      Interventions: She is intubated. She is not sedated.  Pulmonary:      Effort: Tachypnea present. No accessory muscle usage, respiratory distress or accessory muscle usage. She is intubated.      Breath sounds: Normal breath sounds.   Neurological:      Comments: Intubated   , Lethargic appearing  and Unable to assess strength due to profound weakness in all extremities    Genitourinary/Anorectal:     Comments: Ortiz draining clear yellow urine  Ortiz present.          Diagnostic Studies      EKG: no new  Imaging: no new I have personally reviewed pertinent reports.       Medications:  Scheduled PRN   chlorhexidine, 15 mL, Q12H SARTHAK  enoxaparin, 40 mg, Q24H SARTHAK  fluconazole, 800 mg, Q24H  insulin lispro, 1-5 Units, Q6H SARTHAK  lacosamide, 100 mg, Q12H  levalbuterol, 1.25 mg, TID  methylPREDNISolone sodium succinate, 20 mg, Daily   Followed by  [START ON 4/7/2024] methylPREDNISolone sodium succinate, 10 mg, Daily  minocycline, 200 mg, Q12H  nystatin, , BID  pantoprazole, 40 mg, Q12H SARTHAK  piperacillin-tazobactam, 4.5 g, Q8H  polyethylene glycol, 17 g, Daily  sodium chloride, 2 spray, BID  sodium chloride, 4 mL, TID  sulfamethoxazole-trimethoprim, 1 tablet, Once per day on Monday Wednesday Friday      acetaminophen, 650 mg, Q6H PRN  fentaNYL, 25 mcg, Q1H PRN  ondansetron, 4 mg, Q6H PRN  sodium chloride, 1 Application, Q1H PRN       Continuous              Labs:    CBC    Recent Labs     04/05/24 0522 04/05/24 2050   WBC 13.25* 12.85*   HGB 9.4*  9.4* 9.3*   HCT 29.1*  29.0* 28.5*   PLT 57* 57*   BANDSPCT 15* 9*     BMP    Recent Labs      04/06/24  0100 04/06/24  0503   SODIUM 138 137   K 4.4 4.8    104   CO2 19* 20*   AGAP 14* 13   BUN 43* 40*   CREATININE 0.66 0.62   CALCIUM 10.1 10.0             Coags    Recent Labs     04/05/24  1158   INR 1.63*        Additional Electrolytes  Recent Labs     04/06/24  0031 04/06/24  0100 04/06/24  0503   MG  --  2.2 2.0   PHOS  --  4.1 3.9   CAIONIZED 1.41*  --  1.43*          Blood Gas    No recent results  No recent results     LFTs  Recent Labs     04/05/24  0522   ALT 32   AST 20   ALKPHOS 346*   ALB 2.4*   TBILI 1.42*           Infectious  Recent Labs     04/05/24  0522   PROCALCITONI 5.61*       Bcx x2 (4/6) pending     Bcx x2 (4/4) NG48h    Bcx x2 (3/31) Yeast, ID panel- candida albicans  Sputum Cx (3/31)- Stenotrophomonas maltophilia    quantiferon gold inderterminent    Cryptococcal negative  Aspergillus negative    COVID ag 3/25 negative  COVID ag 3/27 negative    BAL 3/11   --4+ steno malto., 2+ candida  --Viral Cx neg  --Fungal 4+ candida  CMV neg  PCP smear neg  COVID pcr + on 3/11 Glucose  Recent Labs     04/05/24  1158 04/05/24  1841 04/06/24  0100 04/06/24  0503   GLUC 98 127 140 144*               Joe Lazcano DO

## 2024-04-07 LAB
ALBUMIN SERPL BCP-MCNC: 2.3 G/DL (ref 3.5–5)
ALP SERPL-CCNC: 900 U/L (ref 34–104)
ALT SERPL W P-5'-P-CCNC: 37 U/L (ref 7–52)
ANION GAP SERPL CALCULATED.3IONS-SCNC: 10 MMOL/L (ref 4–13)
ANION GAP SERPL CALCULATED.3IONS-SCNC: 12 MMOL/L (ref 4–13)
ANISOCYTOSIS BLD QL SMEAR: PRESENT
AORTIC ROOT: 4.2 CM
APICAL FOUR CHAMBER EJECTION FRACTION: 63 %
AST SERPL W P-5'-P-CCNC: 24 U/L (ref 13–39)
BASOPHILS # BLD MANUAL: 0 THOUSAND/UL (ref 0–0.1)
BASOPHILS NFR MAR MANUAL: 0 % (ref 0–1)
BILIRUB DIRECT SERPL-MCNC: 1 MG/DL (ref 0–0.2)
BILIRUB SERPL-MCNC: 1.5 MG/DL (ref 0.2–1)
BSA FOR ECHO PROCEDURE: 1.97 M2
BUN SERPL-MCNC: 46 MG/DL (ref 5–25)
BUN SERPL-MCNC: 52 MG/DL (ref 5–25)
CALCIUM SERPL-MCNC: 10 MG/DL (ref 8.4–10.2)
CALCIUM SERPL-MCNC: 10.2 MG/DL (ref 8.4–10.2)
CHLORIDE SERPL-SCNC: 105 MMOL/L (ref 96–108)
CHLORIDE SERPL-SCNC: 106 MMOL/L (ref 96–108)
CO2 SERPL-SCNC: 21 MMOL/L (ref 21–32)
CO2 SERPL-SCNC: 21 MMOL/L (ref 21–32)
CREAT SERPL-MCNC: 0.79 MG/DL (ref 0.6–1.3)
CREAT SERPL-MCNC: 0.84 MG/DL (ref 0.6–1.3)
CRP SERPL QL: 165.6 MG/L
EOSINOPHIL # BLD MANUAL: 0 THOUSAND/UL (ref 0–0.4)
EOSINOPHIL NFR BLD MANUAL: 0 % (ref 0–6)
ERYTHROCYTE [DISTWIDTH] IN BLOOD BY AUTOMATED COUNT: 18.9 % (ref 11.6–15.1)
ERYTHROCYTE [SEDIMENTATION RATE] IN BLOOD: 48 MM/HOUR (ref 0–29)
FRACTIONAL SHORTENING: 34 (ref 28–44)
GFR SERPL CREATININE-BSD FRML MDRD: 76 ML/MIN/1.73SQ M
GFR SERPL CREATININE-BSD FRML MDRD: 82 ML/MIN/1.73SQ M
GLUCOSE SERPL-MCNC: 144 MG/DL (ref 65–140)
GLUCOSE SERPL-MCNC: 152 MG/DL (ref 65–140)
GLUCOSE SERPL-MCNC: 174 MG/DL (ref 65–140)
GLUCOSE SERPL-MCNC: 179 MG/DL (ref 65–140)
GLUCOSE SERPL-MCNC: 187 MG/DL (ref 65–140)
GLUCOSE SERPL-MCNC: 195 MG/DL (ref 65–140)
GLUCOSE SERPL-MCNC: 200 MG/DL (ref 65–140)
GLUCOSE SERPL-MCNC: 219 MG/DL (ref 65–140)
GLUCOSE SERPL-MCNC: 229 MG/DL (ref 65–140)
GLUCOSE SERPL-MCNC: 245 MG/DL (ref 65–140)
GLUCOSE SERPL-MCNC: 249 MG/DL (ref 65–140)
GLUCOSE SERPL-MCNC: 267 MG/DL (ref 65–140)
GLUCOSE SERPL-MCNC: 288 MG/DL (ref 65–140)
HCT VFR BLD AUTO: 23.9 % (ref 34.8–46.1)
HGB BLD-MCNC: 7.8 G/DL (ref 11.5–15.4)
INR PPP: 1.4 (ref 0.84–1.19)
INTERVENTRICULAR SEPTUM IN DIASTOLE (PARASTERNAL SHORT AXIS VIEW): 1.1 CM
INTERVENTRICULAR SEPTUM: 1.1 CM (ref 0.6–1.1)
LEFT ATRIUM SIZE: 2.3 CM
LEFT INTERNAL DIMENSION IN SYSTOLE: 2.7 CM (ref 2.1–4)
LEFT VENTRICULAR INTERNAL DIMENSION IN DIASTOLE: 4.1 CM (ref 3.5–6)
LEFT VENTRICULAR POSTERIOR WALL IN END DIASTOLE: 1.1 CM
LEFT VENTRICULAR STROKE VOLUME: 46 ML
LVSV (TEICH): 46 ML
LYMPHOCYTES # BLD AUTO: 0 % (ref 14–44)
LYMPHOCYTES # BLD AUTO: 0 THOUSAND/UL (ref 0.6–4.47)
MAGNESIUM SERPL-MCNC: 1.9 MG/DL (ref 1.9–2.7)
MCH RBC QN AUTO: 29.9 PG (ref 26.8–34.3)
MCHC RBC AUTO-ENTMCNC: 32.6 G/DL (ref 31.4–37.4)
MCV RBC AUTO: 92 FL (ref 82–98)
METAMYELOCYTES NFR BLD MANUAL: 1 % (ref 0–1)
MONOCYTES # BLD AUTO: 0.31 THOUSAND/UL (ref 0–1.22)
MONOCYTES NFR BLD: 2 % (ref 4–12)
MYELOCYTES NFR BLD MANUAL: 2 % (ref 0–1)
NEUTROPHILS # BLD MANUAL: 14.91 THOUSAND/UL (ref 1.85–7.62)
NEUTS BAND NFR BLD MANUAL: 18 % (ref 0–8)
NEUTS SEG NFR BLD AUTO: 77 % (ref 43–75)
NRBC BLD AUTO-RTO: 7 /100 WBC (ref 0–2)
PHOSPHATE SERPL-MCNC: 4.6 MG/DL (ref 2.7–4.5)
PLATELET # BLD AUTO: 59 THOUSANDS/UL (ref 149–390)
PLATELET BLD QL SMEAR: ABNORMAL
POIKILOCYTOSIS BLD QL SMEAR: PRESENT
POLYCHROMASIA BLD QL SMEAR: PRESENT
POTASSIUM SERPL-SCNC: 4.4 MMOL/L (ref 3.5–5.3)
POTASSIUM SERPL-SCNC: 4.9 MMOL/L (ref 3.5–5.3)
PROT SERPL-MCNC: 4.3 G/DL (ref 6.4–8.4)
PROTHROMBIN TIME: 17 SECONDS (ref 11.6–14.5)
RA PRESSURE ESTIMATED: 3 MMHG
RBC # BLD AUTO: 2.61 MILLION/UL (ref 3.81–5.12)
RBC MORPH BLD: PRESENT
RV PSP: 27 MMHG
SL CV LV EF: 70
SL CV PED ECHO LEFT VENTRICLE DIASTOLIC VOLUME (MOD BIPLANE) 2D: 73 ML
SL CV PED ECHO LEFT VENTRICLE SYSTOLIC VOLUME (MOD BIPLANE) 2D: 26 ML
SODIUM SERPL-SCNC: 137 MMOL/L (ref 135–147)
SODIUM SERPL-SCNC: 138 MMOL/L (ref 135–147)
T3FREE SERPL-MCNC: 3.45 PG/ML (ref 2.5–3.9)
TR MAX PG: 24 MMHG
TR PEAK VELOCITY: 2.4 M/S
TRICUSPID VALVE PEAK REGURGITATION VELOCITY: 2.43 M/S
WBC # BLD AUTO: 15.69 THOUSAND/UL (ref 4.31–10.16)

## 2024-04-07 PROCEDURE — 88360 TUMOR IMMUNOHISTOCHEM/MANUAL: CPT | Performed by: PATHOLOGY

## 2024-04-07 PROCEDURE — 94003 VENT MGMT INPAT SUBQ DAY: CPT

## 2024-04-07 PROCEDURE — 88313 SPECIAL STAINS GROUP 2: CPT | Performed by: PATHOLOGY

## 2024-04-07 PROCEDURE — 86140 C-REACTIVE PROTEIN: CPT | Performed by: EMERGENCY MEDICINE

## 2024-04-07 PROCEDURE — 80048 BASIC METABOLIC PNL TOTAL CA: CPT

## 2024-04-07 PROCEDURE — 97605 NEG PRS WND THER DME<=50SQCM: CPT | Performed by: SURGERY

## 2024-04-07 PROCEDURE — 86341 ISLET CELL ANTIBODY: CPT | Performed by: EMERGENCY MEDICINE

## 2024-04-07 PROCEDURE — 84481 FREE ASSAY (FT-3): CPT

## 2024-04-07 PROCEDURE — 94664 DEMO&/EVAL PT USE INHALER: CPT

## 2024-04-07 PROCEDURE — 83735 ASSAY OF MAGNESIUM: CPT

## 2024-04-07 PROCEDURE — C9254 INJECTION, LACOSAMIDE: HCPCS | Performed by: STUDENT IN AN ORGANIZED HEALTH CARE EDUCATION/TRAINING PROGRAM

## 2024-04-07 PROCEDURE — 88305 TISSUE EXAM BY PATHOLOGIST: CPT | Performed by: PATHOLOGY

## 2024-04-07 PROCEDURE — 86255 FLUORESCENT ANTIBODY SCREEN: CPT | Performed by: EMERGENCY MEDICINE

## 2024-04-07 PROCEDURE — 85652 RBC SED RATE AUTOMATED: CPT | Performed by: EMERGENCY MEDICINE

## 2024-04-07 PROCEDURE — C9113 INJ PANTOPRAZOLE SODIUM, VIA: HCPCS | Performed by: STUDENT IN AN ORGANIZED HEALTH CARE EDUCATION/TRAINING PROGRAM

## 2024-04-07 PROCEDURE — 88312 SPECIAL STAINS GROUP 1: CPT | Performed by: PATHOLOGY

## 2024-04-07 PROCEDURE — 88342 IMHCHEM/IMCYTCHM 1ST ANTB: CPT | Performed by: PATHOLOGY

## 2024-04-07 PROCEDURE — 85610 PROTHROMBIN TIME: CPT

## 2024-04-07 PROCEDURE — 82948 REAGENT STRIP/BLOOD GLUCOSE: CPT

## 2024-04-07 PROCEDURE — 80076 HEPATIC FUNCTION PANEL: CPT

## 2024-04-07 PROCEDURE — 94640 AIRWAY INHALATION TREATMENT: CPT

## 2024-04-07 PROCEDURE — 85007 BL SMEAR W/DIFF WBC COUNT: CPT

## 2024-04-07 PROCEDURE — 99291 CRITICAL CARE FIRST HOUR: CPT | Performed by: INTERNAL MEDICINE

## 2024-04-07 PROCEDURE — 99233 SBSQ HOSP IP/OBS HIGH 50: CPT | Performed by: INTERNAL MEDICINE

## 2024-04-07 PROCEDURE — 85027 COMPLETE CBC AUTOMATED: CPT

## 2024-04-07 PROCEDURE — 88341 IMHCHEM/IMCYTCHM EA ADD ANTB: CPT | Performed by: PATHOLOGY

## 2024-04-07 PROCEDURE — 93970 EXTREMITY STUDY: CPT | Performed by: SURGERY

## 2024-04-07 PROCEDURE — 94760 N-INVAS EAR/PLS OXIMETRY 1: CPT

## 2024-04-07 PROCEDURE — 84100 ASSAY OF PHOSPHORUS: CPT

## 2024-04-07 RX ORDER — SODIUM CHLORIDE, SODIUM GLUCONATE, SODIUM ACETATE, POTASSIUM CHLORIDE, MAGNESIUM CHLORIDE, SODIUM PHOSPHATE, DIBASIC, AND POTASSIUM PHOSPHATE .53; .5; .37; .037; .03; .012; .00082 G/100ML; G/100ML; G/100ML; G/100ML; G/100ML; G/100ML; G/100ML
500 INJECTION, SOLUTION INTRAVENOUS ONCE
Status: COMPLETED | OUTPATIENT
Start: 2024-04-07 | End: 2024-04-08

## 2024-04-07 RX ORDER — SODIUM CHLORIDE, SODIUM GLUCONATE, SODIUM ACETATE, POTASSIUM CHLORIDE, MAGNESIUM CHLORIDE, SODIUM PHOSPHATE, DIBASIC, AND POTASSIUM PHOSPHATE .53; .5; .37; .037; .03; .012; .00082 G/100ML; G/100ML; G/100ML; G/100ML; G/100ML; G/100ML; G/100ML
500 INJECTION, SOLUTION INTRAVENOUS ONCE
Status: COMPLETED | OUTPATIENT
Start: 2024-04-07 | End: 2024-04-07

## 2024-04-07 RX ORDER — INSULIN LISPRO 100 [IU]/ML
1-6 INJECTION, SOLUTION INTRAVENOUS; SUBCUTANEOUS EVERY 6 HOURS SCHEDULED
Status: DISCONTINUED | OUTPATIENT
Start: 2024-04-07 | End: 2024-04-07

## 2024-04-07 RX ADMIN — PANTOPRAZOLE SODIUM 40 MG: 40 INJECTION, POWDER, FOR SOLUTION INTRAVENOUS at 22:14

## 2024-04-07 RX ADMIN — PIPERACILLIN SODIUM AND TAZOBACTAM SODIUM 4.5 G: 36; 4.5 INJECTION, POWDER, LYOPHILIZED, FOR SOLUTION INTRAVENOUS at 11:34

## 2024-04-07 RX ADMIN — METHYLPREDNISOLONE SODIUM SUCCINATE 10 MG: 40 INJECTION, POWDER, FOR SOLUTION INTRAMUSCULAR; INTRAVENOUS at 09:57

## 2024-04-07 RX ADMIN — NYSTATIN: 100000 POWDER TOPICAL at 18:26

## 2024-04-07 RX ADMIN — SODIUM CHLORIDE SOLN NEBU 3% 4 ML: 3 NEBU SOLN at 13:44

## 2024-04-07 RX ADMIN — PIPERACILLIN SODIUM AND TAZOBACTAM SODIUM 4.5 G: 36; 4.5 INJECTION, POWDER, LYOPHILIZED, FOR SOLUTION INTRAVENOUS at 04:52

## 2024-04-07 RX ADMIN — MODAFINIL 100 MG: 100 TABLET ORAL at 09:56

## 2024-04-07 RX ADMIN — ENOXAPARIN SODIUM 40 MG: 40 INJECTION SUBCUTANEOUS at 09:56

## 2024-04-07 RX ADMIN — LEVALBUTEROL HYDROCHLORIDE 1.25 MG: 1.25 SOLUTION RESPIRATORY (INHALATION) at 19:27

## 2024-04-07 RX ADMIN — LEVALBUTEROL HYDROCHLORIDE 1.25 MG: 1.25 SOLUTION RESPIRATORY (INHALATION) at 13:44

## 2024-04-07 RX ADMIN — MINOCYCLINE HYDROCHLORIDE 200 MG: 50 TABLET, FILM COATED ORAL at 22:06

## 2024-04-07 RX ADMIN — SODIUM CHLORIDE, SODIUM GLUCONATE, SODIUM ACETATE, POTASSIUM CHLORIDE, MAGNESIUM CHLORIDE, SODIUM PHOSPHATE, DIBASIC, AND POTASSIUM PHOSPHATE 500 ML: .53; .5; .37; .037; .03; .012; .00082 INJECTION, SOLUTION INTRAVENOUS at 10:37

## 2024-04-07 RX ADMIN — SODIUM CHLORIDE SOLN NEBU 3% 4 ML: 3 NEBU SOLN at 07:57

## 2024-04-07 RX ADMIN — LACOSAMIDE 100 MG: 10 INJECTION, SOLUTION INTRAVENOUS at 09:57

## 2024-04-07 RX ADMIN — Medication 800 MG: at 10:30

## 2024-04-07 RX ADMIN — POLYETHYLENE GLYCOL 3350 17 G: 17 POWDER, FOR SOLUTION ORAL at 09:56

## 2024-04-07 RX ADMIN — SODIUM CHLORIDE, SODIUM GLUCONATE, SODIUM ACETATE, POTASSIUM CHLORIDE, MAGNESIUM CHLORIDE, SODIUM PHOSPHATE, DIBASIC, AND POTASSIUM PHOSPHATE 500 ML: .53; .5; .37; .037; .03; .012; .00082 INJECTION, SOLUTION INTRAVENOUS at 08:00

## 2024-04-07 RX ADMIN — Medication 2 SPRAY: at 09:57

## 2024-04-07 RX ADMIN — CHLORHEXIDINE GLUCONATE 0.12% ORAL RINSE 15 ML: 1.2 LIQUID ORAL at 22:05

## 2024-04-07 RX ADMIN — INSULIN LISPRO 2 UNITS: 100 INJECTION, SOLUTION INTRAVENOUS; SUBCUTANEOUS at 00:10

## 2024-04-07 RX ADMIN — NYSTATIN: 100000 POWDER TOPICAL at 09:56

## 2024-04-07 RX ADMIN — ACETAMINOPHEN 650 MG: 325 TABLET, FILM COATED ORAL at 12:57

## 2024-04-07 RX ADMIN — LEVALBUTEROL HYDROCHLORIDE 1.25 MG: 1.25 SOLUTION RESPIRATORY (INHALATION) at 07:57

## 2024-04-07 RX ADMIN — PANTOPRAZOLE SODIUM 40 MG: 40 INJECTION, POWDER, FOR SOLUTION INTRAVENOUS at 09:56

## 2024-04-07 RX ADMIN — MINOCYCLINE HYDROCHLORIDE 200 MG: 50 TABLET, FILM COATED ORAL at 09:57

## 2024-04-07 RX ADMIN — PIPERACILLIN SODIUM AND TAZOBACTAM SODIUM 4.5 G: 36; 4.5 INJECTION, POWDER, LYOPHILIZED, FOR SOLUTION INTRAVENOUS at 18:45

## 2024-04-07 RX ADMIN — CHLORHEXIDINE GLUCONATE 0.12% ORAL RINSE 15 ML: 1.2 LIQUID ORAL at 09:56

## 2024-04-07 RX ADMIN — SODIUM CHLORIDE 5 UNITS/HR: 9 INJECTION, SOLUTION INTRAVENOUS at 08:30

## 2024-04-07 RX ADMIN — Medication 2 SPRAY: at 22:05

## 2024-04-07 RX ADMIN — SODIUM CHLORIDE, SODIUM GLUCONATE, SODIUM ACETATE, POTASSIUM CHLORIDE, MAGNESIUM CHLORIDE, SODIUM PHOSPHATE, DIBASIC, AND POTASSIUM PHOSPHATE 500 ML: .53; .5; .37; .037; .03; .012; .00082 INJECTION, SOLUTION INTRAVENOUS at 18:45

## 2024-04-07 RX ADMIN — INSULIN LISPRO 2 UNITS: 100 INJECTION, SOLUTION INTRAVENOUS; SUBCUTANEOUS at 05:20

## 2024-04-07 RX ADMIN — LACOSAMIDE 100 MG: 10 INJECTION, SOLUTION INTRAVENOUS at 22:05

## 2024-04-07 RX ADMIN — SODIUM CHLORIDE SOLN NEBU 3% 4 ML: 3 NEBU SOLN at 19:27

## 2024-04-07 NOTE — PROGRESS NOTES
[facility]  Progress Note: Critical Care  Name: Carla Hunt 59 y.o. 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; complicated by poor wound healing s/p OR 4/4 with surgically created mucus fistula and end ileostomy  Esophagitis; on PPI  Bilateral ground glass opacities,  improving; persistent COIVD infection now resolved   Pancytopenia- multifactorial including hx b-cell lymphoma, persistent inflammation, s/p granix x3  Acute on chronic anemia- multifactorial-intermittent blood loss from ostomy site, chronic inflammation, iatrogenic, marrow dysfunction in setting of persistent inflammation; requiring intermittent prbc's  Lymphopenia with bandemia, in setting of high dose corticosteroids, improving s/p granix x3  CKD3  Oropharyngeal dysphagia, likely 2/2 multiple prolonged intubations; awaiting NMES  Cutaneous paratracheal ulceration  Gross hematuria, intermittent, on CBI intermittently  B-Cell lymphoma, s/p chemotheraphy 2022, Rituxan maintenance therapy on hold  Seizure disorder; on vimpat  Steroid induced hyperglycemia;on insulin gtt  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  Hx of complex migraines s/p L temporal lobectomy 2007 complicated by below  Hx of complex seizures in setting of #17, on AEDs  COVID-19 infection POA; resolved; s/p multiple courses of antivirals 2/2 persistent infection,  most recent Remdesivir course  completed 3/15, superimposed by recurrent PNA s/p multiple courses Abx  Minimal SAH POA, no interventions required, resolved on repeat CTH       Neuro:  Diagnosis: Alertness  -Continue modafinil 100mg qd   -Delirium precautions  Diagnosis: Analgesia  -PRN Fentanyl 25mcg/hr; on holiday for frequent neurochecks     Diagnosis: Metabolic encephalopathy; POA, improving  -Waxing and waning neuro exam since admission  -Most recent repeat CTH 3/13 negative for acute intracranial findings.  Has had extensive neurological workup including MRI/CT neuroimaging, LP with unremarkable findings  -Now remains off sedation. Electrolytes, sodium, Hyperglycemia 2/2 high dose steroids requiring insulin gtt. Ammonia normal. May be prolonged 2/2 PNA, possibly worsened by uremic encephalopathy worsened by ELIESER now resolved, uremia improving; candida growth seen on BAL Cx from 3/11, may be respiraotry colonization, however currently without signs of systemic fungal infection but is at high risk 2/2 lymphopenia, depressed immunoglobulins in setting of B cell lymphoma and high dose corticosteroids.   3/31- More obtunded, mentation worsening. STAT CTH w no new changes.   -Bcx 2/2 (3/31) growing fungemia, identified as candida albicans  -Bedside EGD x2 (4/2,4/3) Ulcerated mucosa in the upper third of the esophagus without evidence of hemorrhage   -Cryptococcal ag negative  -Aspergillus galactomannan ag negative  -Tb quantiferon gold indeterminate 2/2 lymphopenia   -MRI brain 4/6- multiple small bilateral foci of acute infarcts. No significant edema, mass effect, or hemorrhagic transformation.   --  Plan:  -Aggressive steroid wean: currently IV Solumedrol 10mg x1, stop thereafter   -CRRT on held due to improving uremia   -Follow up serial blood cultures  -Plan for SATURNINO as below  -S/P 14d Remdesivir course to tx COVID-19, completed 3/15  -S/P 14d Minocycline course to tx stenotrophomonas PNA, completed 3/27   -Continue modafinil as above  -Avoid  PRN fentanyl/sedative meds as able.  -Delirium precautions  -Frequent neurochecks  -Defer MRI brain due to clinical improvement, not likely to      Diagnosis: CVA due to multiple bilateral foci of acute infarcts   -MRI brain 4/6- multiple small bilateral foci of acute infarcts. No significant edema, mass effect, or hemorrhagic transformation  -Not on blood thinners. Recent TTE 4/1 without vegetation   Plan:  -SATURNINO requested to assess for endocarditis in light of brain MRI concerning for septic emboli in setting of fungemia; bubble study to assess pfo/cardioembolic phenomenon; cardiology consulted  -Avoid ASA due to risk for hemorraghic conversion in setting of possible septic emboli  -Continue statin  -BS goal 140-180  -Frequent neurochecks    Diagnosis: seizure disorder, known history  -S/p partial left temporal lobe resection in 2007 2/2 complex migraines  -On Lamictal 150 bid and Topamax 50mg bid at home  -Last lamotrigine level from 03/02 at 10.7 (therapeutic)  -Home Lamictal switched to Vimpat 3/27 due to worsening pancytopenia, neuro following  -Continue Vimpat 100mg bid maintenance dose  -Continue home topiramate 50mg bid  -Seizure precautions      Diagnosis: Anxiety, known history  -On Effexor 12.5 mg TID     CV:  -Diagnosis: Sinus tachycardia  -TTE 3/17 with no major structural dysfunction  -Multifactorial 2/2 deconditioning, dehydration/hypvol, acute on chronic anemia, underlying infectious/inflammatory process, pain, Modafinal-induced  TTE 4/1 with EF 70%, no WMA, no changes from prior  Plan:  - See plans under Pulm, Heme/Onc, ID, MSK    Pulm:  Diagnosis: Acute hypoxic respiratory failure;  Diagnosis: Bilateral ground glass opacities, improving  -Vent dependent; s/p trach 3/12 after was reintubated 3/11 due to increased WOB  -Persistently COVID+ since admission s/p multiple courses of antivirals (most recent completed 3/15), complicated by recurrent bacterial PNA treated w 10d levaquin  (completed 2/25) for MDR PNA; most recent BAL +recurrent stenotrophomona treated with Bactrim, swithced to minocyline 14d course completed 3/28.  -Etiology Likely multifactorial including possible COVID pneumonitis vs recurrent PNA vs hypersensitivity pneumonitis 2/2 ?rituxumab. Persistent inflammation likely given patient has been steroid responsive throughout admission.   -CRP increasing but likely from intraabdominal inflammation as patient is noted to have clinical improvement with weaning of steroids, ABx.   -Repeat CXR 3/22 with significant improvement of multifocal PNA. 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 2/2 poor wound healing midline incision  -Poor wound healing likely due to high dose steroid therapy s/p wound vac that was removed 3/17 by gen surg now s/p s/p OR 4/5 with surgically created mucus fistula and end ileostomy  Plan:  -Wound vac as per general surgery  -Monitor ostomy pouch output  -Continue Zosyn in setting of fecal contamination of wound prior to OR intervention; ID following      Diagnosis: oropharyngeal dysphagia   -Has had multiple and prolonged intubations now s/p trach   -VBS 3/27 oropharyngeal dysphagia  -Speech therapy to begin neuromuscular electrical stim/NMES/VitalStim pending off ventilation. TT speech therapist when indicated.    -Continue tube feeds for now until potential PEG  placement pending abdominal wound healing as per Gen Surg    Diagnosis: Esophagitis   Acute on chronic anemia associated bloody NG output on 4/3  Uremic, worsening suggestive of UGIB given relatively stable Cr  Bedside EGD x2 (4/2,4/3) Ulcerated mucosa in the upper third of the esophagus without evidence of hemorrhage   Plan:  Follow up HSV/CMV/candida from GI sample, pending  Continue PPI IV bid  Monitor NGT output, stool output    :  Diagnosis: Uremia  -?catabolic from steroids, may also have ?slow UGIB in setting of prolonged steroids though no overt s/s of GIB and H&H stable since 3/23 and patient is on ppi  -Trending up   -EGD without active source of bleedig   -FEurea suggestive of intrinsic pathology   -Aggressive steroid wean as above  -Improving with CRRT, on hold  -BMP q12      Diagnosis: ELIESER on CKD III, resolved  -Baseline Cr  0.8-1.2  -Cr now stable and at baseline.   -UO adequate, on Ortiz, draining clear yellow urine  -Prerenal azotemia 2/2 uremia,   Plan:  -Trend daily BMP  -Continue to monitor I/O and UOP  -Avoid nephrotoxins, contrast dye as able  -Resolved with CRRT    F/E/N:  F: intermittent IVF boluses   E: monitor and replete for goal K>4, P>3, Mg>2  N: tube feeds with vital 1.5; 45 cc/h, Prosource liquid protein to 1 packet BID, Refugio BID to support wound healing       Heme/Onc:  Diagnosis: Pancytopenia, improving with intermittent plt/prbc's transfusion, s/p Filgastrim x3  -In setting of hx of B cell lymphoma, prior chemo, Rituxan therapy, prior abx and present meds including lamictal  -Hemo onc consulted, empirically given Filgastrim 300mg qd x3d (complated 3/30); IR bone marrow bx deferred by heme-onc   -Lamictal switched to Vimpat in consultation with neuro as above due to potential contribution to pancytopenia  -Trend CBC and diff daily, neutropenic precautions for ANC <500    Diagnosis: Acute on chronic anemia  -Baseline Hgb ~7-8. S/P 2U PRBCs 3/17 after repeat Hgb 5.3, total of 6U  transfuse  d since admission.  -Patient had significant blood loss from ostomy site 3/20, resulting in hemorraghic shock, improved with blood transfusion; hemostasis achieved after bleeding source identified and sutured. Another large vol danie bloody output from osotomy on 3/21, bleeding source within ostomy site, sutured.    -Also having intermittent vaginal bleeding  -EGD 4/3,4/4 without active bleeding  -?Worsened by impaired marrow response liekly 2/2 persistent inflammation due to low retic index ~1 prior to most recent transfusions; normocytic with normal B12/folate levels; MCV<100. Iatrogenic cause likely contributing 2/2 frequent blood draws; chronic anemia likely persistent inflammatory state/CKD/lymphoma in setting of elevated ferritin of 974. SPEP/UPEP negative. Hemolysis workup negative.   Plan:  -Routine monitoring ostomy output, if bleeding, apply direct pressure and contact gen surg STAT for hemostasis .  -Continue tube feeds/nutritional support  -Give FFP/vitamin K to correct INR as indicated  -Trend CBC, transfuse Transfuse with leukoreduced and irradiated RBCs to maintain Hgb>7  -See plan under pancytopenia     Diagnosis: Thrombocytopenia  -Likely multifactorial including imparied production from marrow dysfunction in setting of persistent inflammation; RI low suggestive of hyperproliferation, hx of B-cell lymphoma, recent infections including persistent COVID, PNA treated, CMV negative. No known history of vWD/congenital disorders. No liver dysfunction; recent hepatic profile unremarkable. HIT workup negative, Hemolytic workup negative. No s/s DIC. No mechanical valves. ?Primary ITP.  Plan:  -Filgrastim 300mg x3 to aid in plt recovery  -Transfuse with leukoreduced and irradiated PLTs if count <20k due to increased risk of bleeding   -Goal >50k if bleeding; goal >20K if no bleeding  -Hold Lovenox for DVT ppx if Plt<50k.  -See plan under pancytopenia      Diagnosis Lymphopenia with bandemia  -In  setting of high dose steroids  -Severely depressed immunoglobulins inclding IgG, IgA, IgM  -At risk for further infections  -Filgrastim 300mg x3 to aid in plt recovery  -Contact and airborne precautions    Diagnosis: B cell lymphoma  -Follows with heme/onc at [facility]  -S/P 6 cycles of chemotherapy in 20222  -Maintained on Rituxan for 2 year course PTA, started May 2022, last dose was 12/2024, treatment currently on hold in setting of persistent COVID-19 infection  Anti-Ritux Ab negative  Plan:  -Holding rituximab in setting of persistent COVID-19 infection, now resolved.  ?resume rituxubmab pending clinical stability & anti-ritux ab status in consultation with heme-onc     DVT ppx: Lovenox     Endo:  Diagnosis: steroid hyperglycemia and diabetes mellitus type 2, A1c at 6.6 from 01/2024  -Goal -180  -Insulin gtt    Diagnosis: R thyroid gland enlargement  -Seen on CT 4/5  -Follow up US 4/5- Limited exam due to overlying tracheostomy tube and central line catheter. Heterogeneous lobular appearance to the remaining thyroid gland, nonspecific, question sequela of thyroiditis. No discrete nodule identified.   T4/TSH unremarkable   Plan: IR consulted for FNA, bedside Bx tentatively planned for 4/8     ID:  Diagnosis: fungemia   Bcx x2 (3/31) growing yeast, ID panel- candida albicans  In setting of severe lymphopenia and severely depressed immunoglobulins   TTE already done 4/1, no new changes  Ophtho consulted, no evidence of ophthalmic endophthalmitis   Plan:   Hold further micofungin doses as per ID (d/c'd 4/5)  Continue fluconazole 800 mg qd (ELIESER resolved on CRRT)  Avoid broad spectrum abx as it can contribute to fungal growth, however requiring Zosyn in setting of fecal contamination of woundd  Central/midlines changed after first positive Bcx for fungemia   F/U Repeat serial Bcx (q48h) to ensure clearance    ID following; all Abx will need dose adjustments if renal function worsens    Diagnosis: PCP ppx  On IV  bactrim DS 2/2 prolonged high dose steroid therapy     Diagnosis: Recurrent bacterial pneumonia  - Patient has completed multiple treatment courses of remdesivir throughout hospitalization in addition to 7 days of ceftriaxone.  - BAL cultures in 2/2024 positive for MDR Pseudomonas and stenotrophomonas treated with 10d course of Levaquin  - CT C/A/P 3/10 with persistent groundglass opacities and changes suggestive of sequelae of COVID, prior infections.  Completed 10d course of cefepime for broad spectrum coveragge (completed (3/13)  -Repeat BAL cultures from 3/11 showing 4+ growth Stenotrophomonas maltophilia. Could be colonization given prior BAL growth of same, however due to recent intubation with prolonged corticosteroid theraphy, will begin treating as true pathogen. Patient otherwise remains afebrile, no leukocytosis. CRP with down trending.  Previously on Bactrim from 3/13 to 3/18, discontinued due to worsening ELIESER  Plan:  -S/P 14d Minocycline course to tx stenotrophomonas PNA, completed 3/27   -IV steroids as above  -Continue minocycline sputum Cx growing steno (3/31)       MSK/Skin:  Diagnosis: Midline incision wound with poor wound healing-  -General surgery performed staple removal of the patient's midline incision on 3/12 with some skin signs appreciated at the inferior aspect of the wound without obvious pus drainage. Overnight 3/13, wound dehiscence progressed requiring general surgery reevaluation. Red/brown aspirate noted, fascia intact. Wet-to-dry dressings in place. General surgery following for next Epson wound care.  -Poor wound healing in setting of high-dose steroid therapy.  No  exam no obvious signs of infection at this time.   -S/P wound vac replacement 3/13 as per gen surgery. No active concerns for cellulitis.  Plan:  -Wound VAC management as per general surgery  -Wound care for peritracheostomy wound  -Abdominal wound care as per general surgery  -Routine monitoring     Diagnosis:  Critical illness myopathy  -Likely exacerbated by high-dose steroid therapy  Plan:  -Continue to wean steroids as above  -Aggressive PT/OT once clinically stable        Disposition: Critical care    ICU Core Measures     Vented Patient  VAP Bundle  VAP bundle ordered     A: Assess, Prevent, and Manage Pain Has pain been assessed? Yes  Need for changes to pain regimen? NA   B: Both Spontaneous Awakening Trials (SATs) and Spontaneous Breathing Trials (SBTs) Plan to perform spontaneous awakening trial today? N/A   Plan to perform spontaneous breathing trial today? N/A   Obvious barriers to extubation? NA   C: Choice of Sedation RASS Goal: N/A patient not on sedation  Need for changes to sedation or analgesia regimen? NA   D: Delirium CAM-ICU: Negative   E: Early Mobility  Plan for early mobility? Yes   F: Family Engagement Plan for family engagement today? Yes       Antibiotic Review: Patient on appropriate coverage based on culture data.  and Infectious disease consulted    Review of Invasive Devices:    Ortiz Plan: voiding trial today        Prophylaxis:  VTE Contraindicated secondary to: Plt <50   Stress Ulcer  covered bypantoprazole (PROTONIX) injection 40 mg [[id number]]        Significant 24hr Events       Overnight: · No acute overnight events        Subjective   Unable to obtain ROS as below    Review of Systems   Unable to perform ROS: Acuity of condition        Objective                            Vitals I/O      Most Recent Min/Max in 24hrs   Temp 98.4 °F (36.9 °C) Temp  Min: 97.3 °F (36.3 °C)  Max: 98.6 °F (37 °C)   Pulse (!) 137 Pulse  Min: 118  Max: 138   Resp (!) 27 Resp  Min: 17  Max: 29   /67 BP  Min: 106/63  Max: 149/82   O2 Sat 97 % SpO2  Min: 96 %  Max: 100 %     Vent: APVcmv  16/450/peep6/40%    LDA  Peripherals x3, HD cath (R)   Ileostomy RUQ, draining bilious o/p  Wound vac  NGT   Ortiz  Surgical airway , cuffed       Intake/Output Summary (Last 24 hours) at 4/7/2024 0824  Last data filed at  4/7/2024 0800  Gross per 24 hour   Intake 1028.17 ml   Output 1370 ml   Net -341.83 ml       Diet Enteral/Parenteral; Tube Feeding No Oral Diet; Vital 1.5; Continuous; 45; Prosource Protein Liquid - One Packet; OD; Refugio - One Packet; BID; 50; Water; Every 4 hours    Invasive Monitoring             Physical Exam   Physical Exam  Eyes:      Pupils: Pupils are equal, round, and reactive to light.   Skin:     General: Skin is warm and dry.      Findings: Wound present.      Comments: Abd wound covered in packed dressing   HENT:      Nose: No epistaxis.      Mouth/Throat:      Mouth: Mucous membranes are moist.   Neck:      Trachea: Tracheostomy present.   Cardiovascular:      Rate and Rhythm: Regular rhythm. Tachycardia present.      Heart sounds: No murmur heard.  Musculoskeletal:         General: No swelling.      Right lower leg: No edema.      Left lower leg: No edema.   Abdominal: General: An ostomy site is present. There is distension and ostomy site.     Palpations: Abdomen is soft.      Tenderness: There is no abdominal tenderness.      Comments: Wound vac   Constitutional:       Appearance: She is ill-appearing.      Interventions: She is intubated. She is not sedated.  Pulmonary:      Effort: Tachypnea present. No accessory muscle usage, respiratory distress or accessory muscle usage. She is intubated.      Breath sounds: Normal breath sounds.   Neurological:      Comments: Intubated   , Lethargic appearing  and Unable to assess strength due to profound weakness in all extremities    Genitourinary/Anorectal:     Comments: Ortiz draining clear yellow urine  Ortiz present.          Diagnostic Studies      EKG: no new  Imaging: no new I have personally reviewed pertinent reports.       Medications:  Scheduled PRN   chlorhexidine, 15 mL, Q12H SARTHAK  enoxaparin, 40 mg, Q24H SARTHAK  fluconazole, 800 mg, Q24H  lacosamide, 100 mg, Q12H  levalbuterol, 1.25 mg, TID  methylPREDNISolone sodium succinate, 10 mg,  Daily  minocycline, 200 mg, Q12H  modafinil, 100 mg, Daily  multi-electrolyte, 500 mL, Once  nystatin, , BID  pantoprazole, 40 mg, Q12H SARTHAK  piperacillin-tazobactam, 4.5 g, Q8H  polyethylene glycol, 17 g, Daily  sodium chloride, 2 spray, BID  sodium chloride, 4 mL, TID  sulfamethoxazole-trimethoprim, 1 tablet, Once per day on Monday Wednesday Friday      acetaminophen, 650 mg, Q6H PRN  fentaNYL, 25 mcg, Q1H PRN  ondansetron, 4 mg, Q6H PRN  sodium chloride, 1 Application, Q1H PRN       Continuous    insulin regular (HumuLIN R,NovoLIN R) 1 Units/mL in sodium chloride 0.9 % 100 mL infusion, 0.3-21 Units/hr             Labs:    CBC    Recent Labs     04/06/24  1814 04/07/24  0508   WBC 14.44* 15.69*   HGB 8.0* 7.8*   HCT 25.2* 23.9*   PLT 58* 59*   BANDSPCT 4 18*     BMP    Recent Labs     04/06/24  1814 04/07/24  0508   SODIUM 136 138   K 4.8 4.9    105   CO2 19* 21   AGAP 13 12   BUN 38* 46*   CREATININE 0.62 0.79   CALCIUM 10.2 10.2             Coags    Recent Labs     04/06/24  0813 04/07/24  0508   INR 1.45* 1.40*        Additional Electrolytes  Recent Labs     04/06/24  0031 04/06/24  0100 04/06/24  0503 04/07/24  0508   MG  --    < > 2.0 1.9   PHOS  --    < > 3.9 4.6*   CAIONIZED 1.41*  --  1.43*  --     < > = values in this interval not displayed.          Blood Gas    No recent results  No recent results     LFTs  No recent results          Infectious  No recent results      Bcx x2 (4/6) pending     Bcx x2 (4/4) [id number]    Bcx x2 (3/31) Yeast, ID panel- candida albicans  Sputum Cx (3/31)- Stenotrophomonas maltophilia    quantiferon gold inderterminent    Cryptococcal negative  Aspergillus negative    COVID ag 3/25 negative  COVID ag 3/27 negative    BAL 3/11   --4+ steno malto., 2+ candida  --Viral Cx neg  --Fungal 4+ candida  CMV neg  PCP smear neg  COVID pcr + on 3/11 Glucose  Recent Labs     04/06/24  0100 04/06/24  0503 04/06/24  1814 04/07/24  0508   GLUC 140 144* 203* 249*               Joe Lazcano,  DO

## 2024-04-07 NOTE — PROGRESS NOTES
Progress Note - Nephrology   Carla Hunt 59 y.o. female MRN: [id number]  Unit/Bed#: MICU 10 Encounter: [id number]      Assessment / Plan:  ELIESER, recurrent, in the setting of concern for upper GI bleed.  Patient had prior ELIESER with component of ATN and Bactrim use  -Serum creatinine had improved down to 0.83, has steadily risen since March 29 up to 1.63 with rising BUN at 120. At that time, CRRT was started for oliguria and high BUN with concerns for azotemia  -I suspect ATN/hypotension. Renal indices rapidly improved with CRRT  -monitoring off Renal replacement therapy now. HD line remains in place.  -CRRT initiated 4/4/24 in the evening with UF as tolerated for volume management pre and post op. Now off  -patient off pressors  -CT imaging shows persistent groundglass and consolidative bilateral pulmonary opacities  -dialysis consent on file  -monitor UOP closely  -remains on bactrim which can cause decreased sCr excretion/higher sCr levels. Had been switched to minocycline but back on bactrim  -b/l sCr 0.8-1.2, at baseline today off CRRT. Receiving IVF per ICU team  -did receive IV dye 4/1/24 which could contribute to ELIESER as well  Elevated anion gap acidosis likely d/t ELIESER - bicarb 21, monitor off dialysis  Azotemia with possible uremia - improved on CRRT as above. Doubt ongoing uremia contributing to encephalopathy. Monitor BUN as remains on steroids.  Hypercalcemia - corrected calcium 11.4, last PTH 71.4, UPEP negative in the past, ? D/t immobility. Avoid IV calcium. Unimproved s/p IVF. Hypercalcemia can contribute to ELIESER from vasoconstrictive effect, correct on CRRT  Hyperphosphatemia in setting of ELIESER/decreased clearance - slight elevation today in setting of being off CRRT. Monitor this. If uptrending, consider phos binder.  Anemia in setting of GIB and B cell lymphoma - Hgb improved s/p blood transfusion, Hgb 7.8 today, GI follows, ulcers noted on EGD-no active bleeding  Gross hematuria - s/p CBI,  "urology recommends outpatient cystoscopy, urology reconsulted  Acute hypoxic respiratory failure status post tracheostomy 2024  Candida fungemia-on Diflucan and micafungin IV per primary team  Encephalopathy with SAH and history of seizure disorder  and now embolic strokes- noted to have septic emboli on MRI, per neurology   Cecal volvulus status post ex lap, ileocecectomy and end ileostomy in -s/p OR  for abdominal wall debridement  Thyroid mass with concern for airway compression - IR consulted for biopsy, patient also s/p trach        Subjective:   Unable to review systems as patient on vent via trach at 40% FiO2.  Patient remains encephalopathic.  Is urinating.  Off dialysis.      Objective:     Vitals: Blood pressure 143/67, pulse (!) 137, temperature 98.4 °F (36.9 °C), temperature source Oral, resp. rate (!) 27, height 5' 8\" (1.727 m), weight 83.5 kg (184 lb 1.4 oz), SpO2 97%.,Body mass index is 27.99 kg/m².Temp (24hrs), Av.9 °F (36.6 °C), Min:97.3 °F (36.3 °C), Max:98.6 °F (37 °C)      Weight (last 2 days)       Date/Time Weight    24 0530 83.5 (184.08)    24 0531 82.9 (182.76)              Intake/Output Summary (Last 24 hours) at 2024 0804  Last data filed at 2024 0600  Gross per 24 hour   Intake 928.17 ml   Output 1330 ml   Net -401.83 ml     I/O last 24 hours:  In: 972.4 [NG/GT:75; IV Piggyback:741.4; Feedings:156]  Out: 1406 [Urine:250; Drains:150; Other:706; Stool:300]        Physical Exam:   Physical Exam  Vitals and nursing note reviewed.   Constitutional:       General: She is not in acute distress.     Appearance: Normal appearance. She is well-developed. She is ill-appearing. She is not diaphoretic.   HENT:      Head: Normocephalic and atraumatic.      Mouth/Throat:      Pharynx: No oropharyngeal exudate.   Eyes:      General: No scleral icterus.        Right eye: No discharge.         Left eye: No discharge.   Neck:      Thyroid: No thyromegaly.      " Comments: trach  Cardiovascular:      Rate and Rhythm: Normal rate and regular rhythm.      Heart sounds: No murmur heard.  Pulmonary:      Effort: Pulmonary effort is normal. No respiratory distress.      Breath sounds: Normal breath sounds. No wheezing.   Abdominal:      General: Bowel sounds are normal. There is no distension.      Palpations: Abdomen is soft.      Comments: RUQ ileostomy   Genitourinary:     Comments: +Ortiz  Musculoskeletal:         General: Swelling (anasarca) present.   Skin:     General: Skin is warm and dry.      Coloration: Skin is not jaundiced.      Findings: No rash.   Neurological:      Motor: No abnormal muscle tone.      Comments: On vent via trach, encephalopathic   Psychiatric:      Comments: Unable to assess as patient intubated via trach/sedated         Invasive Devices       Peripheral Intravenous Line  Duration             Peripheral IV 04/05/24 Right Antecubital 1 day              Hemodialysis Catheter  Duration             HD Temporary Double Catheter 2 days              Drain  Duration             Ileostomy RUQ 46 days    Urethral Catheter Three way 18 Fr. 6 days    NG/OG/Enteral Tube Nasogastric 18 Fr Right nare 3 days              Airway  Duration             Surgical Airway Shiley Cuffed 25 days                    Medications:    Scheduled Meds:  Current Facility-Administered Medications   Medication Dose Route Frequency Provider Last Rate    acetaminophen  650 mg Oral Q6H PRN Mahamed P Cardenas, DO      chlorhexidine  15 mL Mouth/Throat Q12H [facility] Mahamed P Cardenas, DO      enoxaparin  40 mg Subcutaneous Q24H [facility] Britany Cornelius, DO      fentaNYL  25 mcg Intravenous Q1H PRN Mahamed P Cardenas, DO      fluconazole  800 mg Intravenous Q24H Mahamed P Cardenas, DO Stopped (04/06/24 1300)    insulin regular (HumuLIN R,NovoLIN R) 1 Units/mL in sodium chloride 0.9 % 100 mL infusion  0.3-21 Units/hr Intravenous Titrated Joe Lazcano,       lacosamide  100 mg Intravenous Q12H Mahamed P Cardenas, DO       levalbuterol  1.25 mg Nebulization TID Mahamed Cardenas, DO      methylPREDNISolone sodium succinate  10 mg Intravenous Daily Emilie Soyeon Kim, MD      minocycline  200 mg Per NG Tube Q12H Mahamedcristian Cardenas, DO      modafinil  100 mg Oral Daily Noreenrimei Cornelius, DO      multi-electrolyte  500 mL Intravenous Once Ammar Alam, DO      nystatin   Topical BID Mahamedcristian Cardenas, DO      ondansetron  4 mg Intravenous Q6H PRN Mahamed P Theresa, DO      pantoprazole  40 mg Intravenous Q12H [facility] Mahamedcristian Cardenas, DO      piperacillin-tazobactam  4.5 g Intravenous Q8H Mahamedcristian Cardenas, DO 4.5 g (04/07/24 0452)    polyethylene glycol  17 g Per NG Tube Daily Mahamed Cardenas, DO      sodium chloride  1 Application Nasal Q1H PRN Mahamed P Theresa, DO      sodium chloride  2 spray Each Nare BID Mahamed Cardenas, DO      sodium chloride  4 mL Nebulization TID Mahamed Cardenas, DO      sulfamethoxazole-trimethoprim  1 tablet Per NG Tube Once per day on Monday Wednesday Friday Mahamed Cardenas, DO         PRN Meds:.  acetaminophen    fentaNYL    ondansetron    sodium chloride    Continuous Infusions:insulin regular (HumuLIN R,NovoLIN R) 1 Units/mL in sodium chloride 0.9 % 100 mL infusion, 0.3-21 Units/hr            LAB RESULTS:      Results from last 7 days   Lab Units 04/07/24  0508 04/06/24  1814 04/06/24  0813 04/06/24  0503 04/06/24  0100 04/06/24  0100 04/05/24  2050 04/05/24  1841 04/05/24  1158 04/05/24  0522 04/04/24  2312 04/04/24  1802 04/04/24  0752 04/03/24  0809 04/03/24  0430   WBC Thousand/uL 15.69* 14.44* 14.57*  --   --   --  12.85*  --   --  13.25* 11.03* 11.56* 12.73*   < >  --    HEMOGLOBIN g/dL 7.8* 8.0* 8.2*  --   --   --  9.3*  --   --  9.4*  9.4* 6.5* 7.4* 7.9*   < >  --    HEMATOCRIT % 23.9* 25.2* 25.6*  --   --   --  28.5*  --   --  29.1*  29.0* 19.7* 23.0* 23.4*   < >  --    PLATELETS Thousands/uL 59* 58* 58*  --   --   --  57*  --   --  57* 52* 57* 68*   < >  --    LYMPHO PCT % 0* 3* 0*  --   --   --  1*  --   --  1* 0* 0* 0*    "< >  --    MONO PCT % 2* 1* 6  --   --   --  2*  --   --  1* 5 1* 13*   < >  --    EOS PCT % 0 0 0  --   --   --  0  --   --  0 0 0 0   < >  --    POTASSIUM mmol/L 4.9 4.8  --  4.8 4.4  --   --  4.7 4.4 4.6 4.4 3.4*  --    < > 3.8  3.8   CHLORIDE mmol/L 105 104  --  104 105  --   --  108 109* 108 115* 112*  --    < > 109*  109*   CO2 mmol/L 21 19*  --  20* 19*  --   --  20* 20* 21 20* 21  --    < > 19*  19*   BUN mg/dL 46* 38*  --  40* 43*  --   --  51* 65* 76* 102* 109*  --    < > 103*  103*   CREATININE mg/dL 0.79 0.62  --  0.62 0.66  --   --  0.76 0.88 1.07 1.45* 1.56*  --    < > 1.48*  1.48*   CALCIUM mg/dL 10.2 10.2  --  10.0 10.1  --   --  9.8 10.0 10.0 9.4 9.7  --    < > 10.0  10.0   ALK PHOS U/L  --   --   --   --   --   --   --   --   --  346*  --   --  381*  --  210*   ALT U/L  --   --   --   --   --   --   --   --   --  32  --   --  34  --  29   AST U/L  --   --   --   --   --   --   --   --   --  20  --   --  21  --  16   MAGNESIUM mg/dL 1.9  --   --  2.0  --  2.2  --  1.9 2.0 2.1 2.2 2.4  --    < > 2.5   PHOSPHORUS mg/dL 4.6*  --   --  3.9  --  4.1  --  4.5 4.6* 5.1* 5.8* 5.7*  --    < > 6.9*    < > = values in this interval not displayed.       CUTURES:  Lab Results   Component Value Date    BLOODCX Received in Microbiology Lab. Culture in Progress. 04/06/2024    BLOODCX Received in Microbiology Lab. Culture in Progress. 04/06/2024    BLOODCX No Growth at 72 hrs. 04/04/2024    BLOODCX No Growth at 72 hrs. 04/04/2024    BLOODCX Josephine albicans (A) 03/31/2024    BLOODCX Josephine albicans (A) 03/31/2024    BLOODCX No Growth After 5 Days. 02/26/2024    URINECX 60,000-69,000 cfu/ml Lactobacillus species (A) 01/07/2024    URINECX <10,000 cfu/ml 12/26/2023    URINECX 20,000-29,000 cfu/ml Escherichia coli (A) 10/30/2020                 Portions of the record may have been created with voice recognition software. Occasional wrong word or \"sound a like\" substitutions may have occurred due to the inherent " limitations of voice recognition software. Read the chart carefully and recognize, using context, where substitutions have occurred.If you have any questions, please contact the dictating provider.

## 2024-04-07 NOTE — PROGRESS NOTES
VAC change      VAC location: Midline abdominal wound    Drains:  Unit Type     V.A.C.ulta       Black foam- 1 applied        White foam- 2 applied        Cycle     Continuous       Target Pressure (mmHg)     125       Dressing Status     Clean;Dry;Intact           Wound description and size:    Previous vac taken down. Base of wound cleaned out with 4x4 gauze and normal saline. Base of wound with healthy viable tissues. Ostomy appears to be clean/viable/functioning. Adaptic x1 placed in bed of wound. Ostomy paste placed around ostomy and bottle cap placed on top. 2 white sponge then 1 black sponge placed in wound bed. Ostomy appliance also placed. VAC holding suction without difficulty. See the attached image for a visual of the wound:        Lc Stewart MD  General Surgery Resident

## 2024-04-07 NOTE — RESPIRATORY THERAPY NOTE
RT Ventilator Management Note  Carla Hunt 59 y.o. female MRN: [id number]  Unit/Bed#: [facility] 10 Encounter: [id number]      Daily Screen         4/6/2024  0800 4/7/2024  0759          Patient safety screen outcome:: Failed Failed (P)       Not Ready for Weaning due to:: Underline problem not resolved --                Physical Exam:   Assessment Type: Assess only  General Appearance: Sleeping  Respiratory Pattern: Assisted  Chest Assessment: Chest expansion symmetrical  Bilateral Breath Sounds: Rhonchi      Resp Comments: (P) Pt received on APVcmv settings. Tolerating well at this time. No changes made. RT will cont to monitor.     04/07/24 0759   Respiratory Assessment   Resp Comments Pt received on APVcmv settings. Tolerating well at this time. No changes made. RT will cont to monitor.   Vent Information   Vent ID [id number]   Vent type Summers G5   Summers Vent Mode APVcmv   APVcmv Settings   Resp Rate (BPM) 16 BPM   VT (mL) 450 mL   Insp Time (sec) 0.7 sec   FIO2 (%) 40 %   PEEP (cmH2O) 6 cmH2O   I:E Ratio 1/4.7   Insp Resistance 1   P-ramp (ms) 100 (ms)   Flow Trigger (LPM) 3 LPM   Humidification Heater   Heater Temp 95 °F (35 °C)   APVcmv Actuals   Resp Rate (BPM) 26 BPM   VT (mL) 478 mL   MV (Obs) 13.2   MAP (cmH2O) 11 cmH2O   Peak Pressure (cmH2O) 24 cmH2O   I:E Ratio (Obs) 1/1.8   Static Compliance (mL/cmH20) 24 mL/cmH2O   Plateau Pressure (cm H2O) 23 cm H2O   Heater Temperature (Obs) 95 °F (35 °C)   APVcmv ALARMS   High Peak Pressure (cmH2O) 45 cmH2O   Low Peak Pressure (cmH2O) 5 cm H2O   High Resp Rate (BPM) 40 BPM   High MV (L/min) 25 L/min   Low MV (L/min) 5 L/min   High VT (mL) 1000 mL   Low VT (mL) 200 mL   Apnea Time (s) 20 S   Maintenance   Alarm (pink) cable attached No   Resuscitation bag with peep valve at bedside Yes   Water bag changed No   Circuit changed No   Daily Screen   Patient safety screen outcome: Failed   IHI Ventilator Associated Pneumonia Bundle   Daily Assessment of Readiness to  Extubate Yes   Surgical Airway Shiley Cuffed   Placement Date/Time: 03/12/24 1200   Tube Size: 8 mm  Type: Tracheostomy  Brand: Naveen  Style: Cuffed   Status Cuff Inflated;Secured   Surgical Airway Cuff Pressure (color) Other (Comment)  (Requiring red)   Equipment at bedside BVM;Wall Suction setup;Additional complete same size trach tube;Additional complete one size smaller trach tube;Obturator;Additional inner cannula;Sterile saline

## 2024-04-07 NOTE — PHYSICAL THERAPY NOTE
Physical Therapy Cancellation Note    Patient's Name: Carla Hunt       24 1045   PT Last Visit   PT Visit Date 24   Note Type   Note type Cancelled Session   Cancel Reasons Medical status   Additional Comments Pt has not been appropriate to participate in skilled PT since last session of 3/29. Goals have since . Will d/c pt from PT caseload. Please re-consult when pt appropriate to participate in PT.       Laura Kern, PT, DPT

## 2024-04-07 NOTE — OCCUPATIONAL THERAPY NOTE
Occupational Therapy         Patient Name: Carla Hunt  Today's Date: 24 1100   OT Last Visit   OT Visit Date 24   Note Type   Note Type Cancelled Session   Cancel Reasons Medical status  (pt has been cancelled for therapy since last tx session on 3/29/24 2* medical status/procedures - current POC/OT goals have since  on  - will sign off at this time 2* same-will need new OT orders when pt is medically stable/able to participate)     Shoshana Garcia

## 2024-04-08 LAB
ABO GROUP BLD: NORMAL
ANION GAP SERPL CALCULATED.3IONS-SCNC: 12 MMOL/L (ref 4–13)
ANISOCYTOSIS BLD QL SMEAR: PRESENT
ARTERIAL PATENCY WRIST A: YES
BACTERIA BLD CULT: ABNORMAL
BACTERIA UR CULT: ABNORMAL
BASE EXCESS BLDA CALC-SCNC: -5.4 MMOL/L
BASOPHILS # BLD MANUAL: 0 THOUSAND/UL (ref 0–0.1)
BASOPHILS NFR MAR MANUAL: 0 % (ref 0–1)
BLD GP AB SCN SERPL QL: NEGATIVE
BUN SERPL-MCNC: 55 MG/DL (ref 5–25)
C ALBICANS DNA BLD POS QL NAA+NON-PROBE: DETECTED
CA-I BLD-SCNC: 1.46 MMOL/L (ref 1.12–1.32)
CALCIUM SERPL-MCNC: 10.2 MG/DL (ref 8.4–10.2)
CHLORIDE SERPL-SCNC: 107 MMOL/L (ref 96–108)
CO2 SERPL-SCNC: 22 MMOL/L (ref 21–32)
CREAT SERPL-MCNC: 0.86 MG/DL (ref 0.6–1.3)
EOSINOPHIL # BLD MANUAL: 0 THOUSAND/UL (ref 0–0.4)
EOSINOPHIL NFR BLD MANUAL: 0 % (ref 0–6)
ERYTHROCYTE [DISTWIDTH] IN BLOOD BY AUTOMATED COUNT: 18.9 % (ref 11.6–15.1)
ERYTHROCYTE [DISTWIDTH] IN BLOOD BY AUTOMATED COUNT: 19 % (ref 11.6–15.1)
GFR SERPL CREATININE-BSD FRML MDRD: 74 ML/MIN/1.73SQ M
GIANT PLATELETS BLD QL SMEAR: PRESENT
GLUCOSE SERPL-MCNC: 107 MG/DL (ref 65–140)
GLUCOSE SERPL-MCNC: 108 MG/DL (ref 65–140)
GLUCOSE SERPL-MCNC: 110 MG/DL (ref 65–140)
GLUCOSE SERPL-MCNC: 127 MG/DL (ref 65–140)
GLUCOSE SERPL-MCNC: 127 MG/DL (ref 65–140)
GLUCOSE SERPL-MCNC: 128 MG/DL (ref 65–140)
GLUCOSE SERPL-MCNC: 154 MG/DL (ref 65–140)
GLUCOSE SERPL-MCNC: 162 MG/DL (ref 65–140)
GLUCOSE SERPL-MCNC: 191 MG/DL (ref 65–140)
GLUCOSE SERPL-MCNC: 66 MG/DL (ref 65–140)
GLUCOSE SERPL-MCNC: 83 MG/DL (ref 65–140)
GLUCOSE SERPL-MCNC: 88 MG/DL (ref 65–140)
GRAM STN SPEC: ABNORMAL
HCO3 BLDA-SCNC: 19.9 MMOL/L (ref 22–28)
HCT VFR BLD AUTO: 18 % (ref 34.8–46.1)
HCT VFR BLD AUTO: 23.5 % (ref 34.8–46.1)
HGB BLD-MCNC: 5.7 G/DL (ref 11.5–15.4)
HGB BLD-MCNC: 7.6 G/DL (ref 11.5–15.4)
HYPERCHROMIA BLD QL SMEAR: PRESENT
INR PPP: 1.34 (ref 0.84–1.19)
LYMPHOCYTES # BLD AUTO: 0 % (ref 14–44)
LYMPHOCYTES # BLD AUTO: 0 THOUSAND/UL (ref 0.6–4.47)
MAGNESIUM SERPL-MCNC: 2 MG/DL (ref 1.9–2.7)
MCH RBC QN AUTO: 29.4 PG (ref 26.8–34.3)
MCH RBC QN AUTO: 29.6 PG (ref 26.8–34.3)
MCHC RBC AUTO-ENTMCNC: 31.7 G/DL (ref 31.4–37.4)
MCHC RBC AUTO-ENTMCNC: 32.3 G/DL (ref 31.4–37.4)
MCV RBC AUTO: 91 FL (ref 82–98)
MCV RBC AUTO: 93 FL (ref 82–98)
METAMYELOCYTES NFR BLD MANUAL: 3 % (ref 0–1)
MISCELLANEOUS LAB TEST RESULT: NORMAL
MONOCYTES # BLD AUTO: 0.19 THOUSAND/UL (ref 0–1.22)
MONOCYTES NFR BLD: 1 % (ref 4–12)
MYELOCYTES NFR BLD MANUAL: 1 % (ref 0–1)
NEUTROPHILS # BLD MANUAL: 17.59 THOUSAND/UL (ref 1.85–7.62)
NEUTS BAND NFR BLD MANUAL: 7 % (ref 0–8)
NEUTS SEG NFR BLD AUTO: 88 % (ref 43–75)
NRBC BLD AUTO-RTO: 3 /100 WBC (ref 0–2)
O2 CT BLDA-SCNC: 9.6 ML/DL (ref 16–23)
OXYHGB MFR BLDA: 95.5 % (ref 94–97)
PCO2 BLDA: 37.8 MM HG (ref 36–44)
PH BLDA: 7.34 [PH] (ref 7.35–7.45)
PHOSPHATE SERPL-MCNC: 4.8 MG/DL (ref 2.7–4.5)
PLATELET # BLD AUTO: 32 THOUSANDS/UL (ref 149–390)
PLATELET # BLD AUTO: 36 THOUSANDS/UL (ref 149–390)
PLATELET BLD QL SMEAR: ABNORMAL
PO2 BLDA: 92.8 MM HG (ref 75–129)
POIKILOCYTOSIS BLD QL SMEAR: PRESENT
POLYCHROMASIA BLD QL SMEAR: PRESENT
POTASSIUM SERPL-SCNC: 4.4 MMOL/L (ref 3.5–5.3)
PROTHROMBIN TIME: 16.4 SECONDS (ref 11.6–14.5)
RBC # BLD AUTO: 1.94 MILLION/UL (ref 3.81–5.12)
RBC # BLD AUTO: 2.57 MILLION/UL (ref 3.81–5.12)
RBC MORPH BLD: PRESENT
RH BLD: NEGATIVE
SIMV VENT INSPIRED AIR FIO2: 40
SIMV VENT PEEP: 6
SIMV VENT TIDAL VOLUME: 450
SIMV VENT: ABNORMAL
SODIUM SERPL-SCNC: 141 MMOL/L (ref 135–147)
SPECIMEN EXPIRATION DATE: NORMAL
SPECIMEN SOURCE: ABNORMAL
VENT SIMV: 12
WBC # BLD AUTO: 16.24 THOUSAND/UL (ref 4.31–10.16)
WBC # BLD AUTO: 18.52 THOUSAND/UL (ref 4.31–10.16)

## 2024-04-08 PROCEDURE — 82805 BLOOD GASES W/O2 SATURATION: CPT

## 2024-04-08 PROCEDURE — 83735 ASSAY OF MAGNESIUM: CPT

## 2024-04-08 PROCEDURE — 99497 ADVNCD CARE PLAN 30 MIN: CPT | Performed by: PHYSICIAN ASSISTANT

## 2024-04-08 PROCEDURE — 85027 COMPLETE CBC AUTOMATED: CPT

## 2024-04-08 PROCEDURE — 84100 ASSAY OF PHOSPHORUS: CPT

## 2024-04-08 PROCEDURE — C9254 INJECTION, LACOSAMIDE: HCPCS | Performed by: STUDENT IN AN ORGANIZED HEALTH CARE EDUCATION/TRAINING PROGRAM

## 2024-04-08 PROCEDURE — 85610 PROTHROMBIN TIME: CPT

## 2024-04-08 PROCEDURE — 94760 N-INVAS EAR/PLS OXIMETRY 1: CPT

## 2024-04-08 PROCEDURE — 80048 BASIC METABOLIC PNL TOTAL CA: CPT

## 2024-04-08 PROCEDURE — 36600 WITHDRAWAL OF ARTERIAL BLOOD: CPT

## 2024-04-08 PROCEDURE — NC001 PR NO CHARGE: Performed by: NURSE PRACTITIONER

## 2024-04-08 PROCEDURE — 86850 RBC ANTIBODY SCREEN: CPT | Performed by: STUDENT IN AN ORGANIZED HEALTH CARE EDUCATION/TRAINING PROGRAM

## 2024-04-08 PROCEDURE — 94664 DEMO&/EVAL PT USE INHALER: CPT

## 2024-04-08 PROCEDURE — 97605 NEG PRS WND THER DME<=50SQCM: CPT | Performed by: PHYSICIAN ASSISTANT

## 2024-04-08 PROCEDURE — 85060 BLOOD SMEAR INTERPRETATION: CPT | Performed by: STUDENT IN AN ORGANIZED HEALTH CARE EDUCATION/TRAINING PROGRAM

## 2024-04-08 PROCEDURE — 85025 COMPLETE CBC W/AUTO DIFF WBC: CPT

## 2024-04-08 PROCEDURE — 99233 SBSQ HOSP IP/OBS HIGH 50: CPT | Performed by: INTERNAL MEDICINE

## 2024-04-08 PROCEDURE — 99498 ADVNCD CARE PLAN ADDL 30 MIN: CPT | Performed by: PHYSICIAN ASSISTANT

## 2024-04-08 PROCEDURE — P9040 RBC LEUKOREDUCED IRRADIATED: HCPCS

## 2024-04-08 PROCEDURE — 86900 BLOOD TYPING SEROLOGIC ABO: CPT | Performed by: STUDENT IN AN ORGANIZED HEALTH CARE EDUCATION/TRAINING PROGRAM

## 2024-04-08 PROCEDURE — 94640 AIRWAY INHALATION TREATMENT: CPT

## 2024-04-08 PROCEDURE — 85007 BL SMEAR W/DIFF WBC COUNT: CPT

## 2024-04-08 PROCEDURE — C9113 INJ PANTOPRAZOLE SODIUM, VIA: HCPCS | Performed by: STUDENT IN AN ORGANIZED HEALTH CARE EDUCATION/TRAINING PROGRAM

## 2024-04-08 PROCEDURE — 82330 ASSAY OF CALCIUM: CPT

## 2024-04-08 PROCEDURE — 99291 CRITICAL CARE FIRST HOUR: CPT | Performed by: INTERNAL MEDICINE

## 2024-04-08 PROCEDURE — 94003 VENT MGMT INPAT SUBQ DAY: CPT

## 2024-04-08 PROCEDURE — 86901 BLOOD TYPING SEROLOGIC RH(D): CPT | Performed by: STUDENT IN AN ORGANIZED HEALTH CARE EDUCATION/TRAINING PROGRAM

## 2024-04-08 PROCEDURE — 97605 NEG PRS WND THER DME<=50SQCM: CPT | Performed by: SURGERY

## 2024-04-08 PROCEDURE — P9037 PLATE PHERES LEUKOREDU IRRAD: HCPCS

## 2024-04-08 PROCEDURE — 82948 REAGENT STRIP/BLOOD GLUCOSE: CPT

## 2024-04-08 PROCEDURE — 86923 COMPATIBILITY TEST ELECTRIC: CPT

## 2024-04-08 PROCEDURE — 99233 SBSQ HOSP IP/OBS HIGH 50: CPT | Performed by: STUDENT IN AN ORGANIZED HEALTH CARE EDUCATION/TRAINING PROGRAM

## 2024-04-08 RX ORDER — FENTANYL CITRATE 50 UG/ML
50 INJECTION, SOLUTION INTRAMUSCULAR; INTRAVENOUS
Status: DISCONTINUED | OUTPATIENT
Start: 2024-04-08 | End: 2024-04-16

## 2024-04-08 RX ORDER — ALBUMIN (HUMAN) 12.5 G/50ML
25 SOLUTION INTRAVENOUS ONCE
Status: COMPLETED | OUTPATIENT
Start: 2024-04-08 | End: 2024-04-08

## 2024-04-08 RX ORDER — DEXTROSE MONOHYDRATE 25 G/50ML
INJECTION, SOLUTION INTRAVENOUS
Status: COMPLETED
Start: 2024-04-08 | End: 2024-04-08

## 2024-04-08 RX ORDER — DEXTROSE MONOHYDRATE 25 G/50ML
25 INJECTION, SOLUTION INTRAVENOUS ONCE
Status: COMPLETED | OUTPATIENT
Start: 2024-04-08 | End: 2024-04-08

## 2024-04-08 RX ORDER — DEXMEDETOMIDINE HYDROCHLORIDE 4 UG/ML
.1-.7 INJECTION, SOLUTION INTRAVENOUS
Status: DISCONTINUED | OUTPATIENT
Start: 2024-04-08 | End: 2024-04-15

## 2024-04-08 RX ORDER — LEVALBUTEROL INHALATION SOLUTION 0.63 MG/3ML
0.31 SOLUTION RESPIRATORY (INHALATION)
Status: DISCONTINUED | OUTPATIENT
Start: 2024-04-08 | End: 2024-04-08

## 2024-04-08 RX ADMIN — SODIUM CHLORIDE SOLN NEBU 3% 4 ML: 3 NEBU SOLN at 07:16

## 2024-04-08 RX ADMIN — DEXTROSE MONOHYDRATE 25 ML: 25 INJECTION, SOLUTION INTRAVENOUS at 10:12

## 2024-04-08 RX ADMIN — SODIUM CHLORIDE 1 UNITS/HR: 9 INJECTION, SOLUTION INTRAVENOUS at 08:35

## 2024-04-08 RX ADMIN — SODIUM CHLORIDE SOLN NEBU 3% 4 ML: 3 NEBU SOLN at 19:11

## 2024-04-08 RX ADMIN — Medication 2 SPRAY: at 21:06

## 2024-04-08 RX ADMIN — PANTOPRAZOLE SODIUM 40 MG: 40 INJECTION, POWDER, FOR SOLUTION INTRAVENOUS at 20:01

## 2024-04-08 RX ADMIN — CHLORHEXIDINE GLUCONATE 0.12% ORAL RINSE 15 ML: 1.2 LIQUID ORAL at 08:30

## 2024-04-08 RX ADMIN — PIPERACILLIN SODIUM AND TAZOBACTAM SODIUM 4.5 G: 36; 4.5 INJECTION, POWDER, LYOPHILIZED, FOR SOLUTION INTRAVENOUS at 18:35

## 2024-04-08 RX ADMIN — NYSTATIN: 100000 POWDER TOPICAL at 18:18

## 2024-04-08 RX ADMIN — MINOCYCLINE HYDROCHLORIDE 200 MG: 50 TABLET, FILM COATED ORAL at 09:14

## 2024-04-08 RX ADMIN — LEVALBUTEROL HYDROCHLORIDE 1.25 MG: 1.25 SOLUTION RESPIRATORY (INHALATION) at 07:16

## 2024-04-08 RX ADMIN — SODIUM CHLORIDE SOLN NEBU 3% 4 ML: 3 NEBU SOLN at 14:24

## 2024-04-08 RX ADMIN — CHLORHEXIDINE GLUCONATE 0.12% ORAL RINSE 15 ML: 1.2 LIQUID ORAL at 20:01

## 2024-04-08 RX ADMIN — MINOCYCLINE HYDROCHLORIDE 200 MG: 50 TABLET, FILM COATED ORAL at 20:01

## 2024-04-08 RX ADMIN — FENTANYL CITRATE 50 MCG: 50 INJECTION INTRAMUSCULAR; INTRAVENOUS at 11:50

## 2024-04-08 RX ADMIN — Medication 2 SPRAY: at 10:19

## 2024-04-08 RX ADMIN — PIPERACILLIN SODIUM AND TAZOBACTAM SODIUM 4.5 G: 36; 4.5 INJECTION, POWDER, LYOPHILIZED, FOR SOLUTION INTRAVENOUS at 03:00

## 2024-04-08 RX ADMIN — MODAFINIL 100 MG: 100 TABLET ORAL at 09:14

## 2024-04-08 RX ADMIN — SULFAMETHOXAZOLE AND TRIMETHOPRIM 1 TABLET: 800; 160 TABLET ORAL at 09:15

## 2024-04-08 RX ADMIN — NYSTATIN: 100000 POWDER TOPICAL at 09:15

## 2024-04-08 RX ADMIN — LACOSAMIDE 100 MG: 10 INJECTION, SOLUTION INTRAVENOUS at 21:25

## 2024-04-08 RX ADMIN — POLYETHYLENE GLYCOL 3350 17 G: 17 POWDER, FOR SOLUTION ORAL at 08:31

## 2024-04-08 RX ADMIN — DEXMEDETOMIDINE HYDROCHLORIDE 0.2 MCG/KG/HR: 4 INJECTION, SOLUTION INTRAVENOUS at 12:27

## 2024-04-08 RX ADMIN — Medication 800 MG: at 08:30

## 2024-04-08 RX ADMIN — ALBUMIN (HUMAN) 25 G: 0.25 INJECTION, SOLUTION INTRAVENOUS at 11:45

## 2024-04-08 RX ADMIN — LACOSAMIDE 100 MG: 10 INJECTION, SOLUTION INTRAVENOUS at 08:30

## 2024-04-08 RX ADMIN — PANTOPRAZOLE SODIUM 40 MG: 40 INJECTION, POWDER, FOR SOLUTION INTRAVENOUS at 08:31

## 2024-04-08 RX ADMIN — PIPERACILLIN SODIUM AND TAZOBACTAM SODIUM 4.5 G: 36; 4.5 INJECTION, POWDER, LYOPHILIZED, FOR SOLUTION INTRAVENOUS at 11:04

## 2024-04-08 RX ADMIN — LEVALBUTEROL HYDROCHLORIDE 0.31 MG: 0.63 SOLUTION RESPIRATORY (INHALATION) at 14:24

## 2024-04-08 NOTE — PROGRESS NOTES
Pastoral Care Progress Note    2024  Patient: Carla Hunt : 1965  Admission Date & Time: 1/10/2024 1941  MRN: [id number] CSN: [id number]         check-in found family reporting that they were ok and did not need care.  remains available.                24 1100   Clinical Encounter Type   Visited With Patient and family together   Routine Visit Introduction

## 2024-04-08 NOTE — PLAN OF CARE
Problem: Prexisting or High Potential for Compromised Skin Integrity  Goal: Skin integrity is maintained or improved  Description: INTERVENTIONS:  - Identify patients at risk for skin breakdown  - Assess and monitor skin integrity  - Assess and monitor nutrition and hydration status  - Monitor labs   - Assess for incontinence   - Turn and reposition patient  - Assist with mobility/ambulation  - Relieve pressure over bony prominences  - Avoid friction and shearing  - Provide appropriate hygiene as needed including keeping skin clean and dry  - Evaluate need for skin moisturizer/barrier cream  - Collaborate with interdisciplinary team   - Patient/family teaching  - Consider wound care consult   Outcome: Progressing     Problem: INFECTION - ADULT  Goal: Absence or prevention of progression during hospitalization  Description: INTERVENTIONS:  - Assess and monitor for signs and symptoms of infection  - Monitor lab/diagnostic results  - Monitor all insertion sites, i.e. indwelling lines, tubes, and drains  - Monitor endotracheal if appropriate and nasal secretions for changes in amount and color  - Sumner appropriate cooling/warming therapies per order  - Administer medications as ordered  - Instruct and encourage patient and family to use good hand hygiene technique  - Identify and instruct in appropriate isolation precautions for identified infection/condition  Outcome: Progressing     Problem: SAFETY ADULT  Goal: Patient will remain free of falls  Description: INTERVENTIONS:  - Educate patient/family on patient safety including physical limitations  - Instruct patient to call for assistance with activity   - Consult OT/PT to assist with strengthening/mobility   - Keep Call bell within reach  - Keep bed low and locked with side rails adjusted as appropriate  - Keep care items and personal belongings within reach  - Initiate and maintain comfort rounds  - Make Fall Risk Sign visible to staff  - Offer Toileting every  2 Hours, in advance of need  - Initiate/Maintain bed alarm  - Obtain necessary fall risk management equipment: non skid footwear  - Apply yellow socks and bracelet for high fall risk patients  - Consider moving patient to room near nurses station  Outcome: Progressing     Problem: Potential for Falls  Goal: Patient will remain free of falls  Description: INTERVENTIONS:  - Educate patient/family on patient safety including physical limitations  - Instruct patient to call for assistance with activity   - Consult OT/PT to assist with strengthening/mobility   - Keep Call bell within reach  - Keep bed low and locked with side rails adjusted as appropriate  - Keep care items and personal belongings within reach  - Initiate and maintain comfort rounds  - Make Fall Risk Sign visible to staff  - Offer Toileting every 2 Hours, in advance of need  - Initiate/Maintain bed alarm  - Obtain necessary fall risk management equipment: non skid footwear  - Apply yellow socks and bracelet for high fall risk patients  - Consider moving patient to room near nurses station  Outcome: Progressing     Problem: Nutrition/Hydration-ADULT  Goal: Nutrient/Hydration intake appropriate for improving, restoring or maintaining nutritional needs  Description: Monitor and assess patient's nutrition/hydration status for malnutrition. Collaborate with interdisciplinary team and initiate plan and interventions as ordered.  Monitor patient's weight and dietary intake as ordered or per policy. Utilize nutrition screening tool and intervene as necessary. Determine patient's food preferences and provide high-protein, high-caloric foods as appropriate.     INTERVENTIONS:  - Monitor oral intake, urinary output, labs, and treatment plans  - Assess nutrition and hydration status and recommend course of action  - Evaluate amount of meals eaten  - Assist patient with eating if necessary   - Allow adequate time for meals  - Recommend/ encourage appropriate diets,  oral nutritional supplements, and vitamin/mineral supplements  - Order, calculate, and assess calorie counts as needed  - Recommend, monitor, and adjust tube feedings and TPN/PPN based on assessed needs  - Assess need for intravenous fluids  - Provide specific nutrition/hydration education as appropriate  - Include patient/family/caregiver in decisions related to nutrition  Outcome: Progressing     Problem: GASTROINTESTINAL - ADULT  Goal: Minimal or absence of nausea and/or vomiting  Description: INTERVENTIONS:  - Administer IV fluids if ordered to ensure adequate hydration  - Maintain NPO status until nausea and vomiting are resolved  - Nasogastric tube if ordered  - Administer ordered antiemetic medications as needed  - Provide nonpharmacologic comfort measures as appropriate  - Advance diet as tolerated, if ordered  - Consider nutrition services referral to assist patient with adequate nutrition and appropriate food choices  Outcome: Progressing  Goal: Maintains adequate nutritional intake  Description: INTERVENTIONS:  - Monitor percentage of each meal consumed  - Identify factors contributing to decreased intake, treat as appropriate  - Assist with meals as needed  - Monitor I&O, weight, and lab values if indicated  - Obtain nutrition services referral as needed  Outcome: Progressing  Goal: Establish and maintain optimal ostomy function  Description: INTERVENTIONS:  - Assess bowel function  - Encourage oral fluids to ensure adequate hydration  - Administer IV fluids if ordered to ensure adequate hydration   - Administer ordered medications as needed  - Encourage mobilization and activity  - Nutrition services referral to assist patient with appropriate food choices  - Assess stoma site  - Consider wound care consult   Outcome: Progressing  Goal: Oral mucous membranes remain intact  Description: INTERVENTIONS  - Assess oral mucosa and hygiene practices  - Implement preventative oral hygiene regimen  - Implement  oral medicated treatments as ordered  - Initiate Nutrition services referral as needed  Outcome: Progressing

## 2024-04-08 NOTE — CONSULTS
"e-Consult (IPC)  - Interventional Radiology  Carla Hunt 59 y.o. female MRN: [id number]  Unit/Bed#: MICU 10 Encounter: [id number]          Interventional Radiology has been consulted to evaluate Carla Hunt    We were consulted by Critical Care concerning this patient with bilateral acute multifocal embolic strokes and RLE DVT with anticoagulation deferred due to concern for septic emboli.    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      She has had a prolonged hospital course c/b MDRO PNA with VDRF s/p Tracheostomy, cecal volvulus s/p right hemicolectomy with ileostomy c/b wound dehiscence and hemorrhagic shock requiring blood transfusions, persistent pancytopenia, and fungemia, now acute bilateral multifocal embolic infracts with concern for septic emboli.    Given concern for septic emboli on MRI brain, and RLE subacute DVT, anticoagulation deferred.  IR consulted for evaluation and possible placement IVC filter.    4/7/24 Bedside TTE completed at bedside deemed poor quality and technically difficult. There is right to left shunting noted using saline contrast at rest and with provocation with may be related to a PFO or intrapulmonary shunt.     Of note, IR consulted 4/6/24 for evaluation of rapid enlarging thyroid. Please see consult by Dr Betancur. \"CT now with hypovascular mass occupying the lobe which is compressing the airway and there is concern for lymphoma given her history. Will attempt thyroid biopsy. This will be challenging given the critical state " "of the patient and presence of a tracheotomy.\"     -Discussed case with Dr Chacon and Dr De La Garza  -Plan for IR IVC placement and possible attempt IR thyroid biopsy. Timing dependent on IR schedule  -NPO with enteral nutrition held 4 hours prior to procedure.  -remainder of care per Critical Care team     11-20 minutes, >50% of the total time devoted to medical consultative verbal/EMR discussion between providers. Written report will be generated in the EMR.     Thank you for allowing Interventional Radiology to participate in the care of Carla Hunt. Please don't hesitate to call or TigerText us with any questions.     DANYELLE Marquez            "

## 2024-04-08 NOTE — ACP (ADVANCE CARE PLANNING)
"Record of Family Meeting - Palliative and Supportive Care   Carla Hunt 59 y.o. female [phone]      Recommendations and Plan:  Ongoing medical management without limits at this time  Spouse, Min, is hopeful for gradual improvement leading to LTACH level of care for ongoing medical optimization and vent weaning  Should IR biopsy be consistent with recurrence/progression of cancer he would be amenable to revisiting GOC.  IR biopsy and IVC filter planned for tomorrow          A family meeting was held for to provide medical update and discuss plan of care.   Time of Meeting: 3:30  Meeting Location: [facility] conference room  Participants:   Dr. Mays and Dr. Morfin, [facility]  Min, spouse  Cathryn, daughter  Hannah Allenleo, [place], palliative    Patient Participation: unable secondary to AMS    Patient Support System: patient's spouse (Min) and children (Cathryn and Clinton)     Advanced Directive of POLST available: no    Topics of Discussion:  Medical update provided including  Timing of IR (Tentatively tomorrow) for thyroid biopsy and IVC filter  Ongoing efforts to wean vent (noted bronchiectasis and atelectasis findings)  Ongoing treatment of fungemia  Awaiting SATURNINO to further specify PFO  Nature of multifocal embolic stroke, however, not thought to be primary driving factor of encephalopathy  Multifactorial contribution to encephalopathy which persists despite maximum medical management.  Clarified difference between LTACH, acute rehab, subacute rehab, and long term SNF care  Differentiated what theoretically could be done at home vs what may be feasible for family  Discussed goals of care   Balance of respecting patient's limit of no SNF with spouse's goal of giving her \"a chance\".   Introduced comfort focused care should patient have additional setbacks or if her \"new normal\" is in a state she would not find acceptable (for example if she completes all skilled rehab needs and remains extremely " debilitated)    Other Content of Meeting:  Time spent providing supportive listening  Recognized that daughter approaching major milestones in her life (wedding) makes this more challenging.    Time Involved in Meetin minutes, beginning at approximately  3:30 and ending at approximately 4:30.     Hannah Ramirez PA-C   Palliative and Supportive Care  Clinic/Answering Service: [phone]  You can find me on [employer]!     This note was not shared with the patient due to privacy exception: note includes other individuals

## 2024-04-08 NOTE — PROGRESS NOTES
Nutrition Follow-Up/Recommendations:    Noted phosphorus trending back up since CRRT stopped.   Pt is also receiving minocycline q12 hrs which requires holding of TF per administration instructions.     Consider changing TF to Nepro, start at 25mL/hr x 20hrs/day (hold TF for one hour before and after each minocycline administration).   Advance by 10mL/hr q4 hrs as tolerated to goal of 45mL/hr x20 hrs/day + add 2 packets Prosource Liquid Protein daily + continue Refugio one packet BID.   At goal, this will provide 1920 kcals, 108g protein, 1014mL water including water for prosource and Refugio administration.     Additional free water flushes per Nephrology and/or critical care; suggest at least 30mL q4 hrs to help maintain tube patency.     RD pended TF order with updated recommendations.

## 2024-04-08 NOTE — PROGRESS NOTES
[facility]  Progress Note: Critical Care  Name: Carla Hunt 59 y.o. 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; complicated by poor wound healing s/p OR 4/4 with surgically created mucus fistula and end ileostomy  Esophagitis; on PPI  Bilateral ground glass opacities,  improving; persistent COIVD infection now resolved   Pancytopenia- multifactorial including hx b-cell lymphoma, persistent inflammation, s/p granix x3  Acute on chronic anemia- multifactorial-intermittent blood loss from ostomy site, chronic inflammation, iatrogenic, marrow dysfunction in setting of persistent inflammation; requiring intermittent prbc's  Lymphopenia with bandemia, in setting of high dose corticosteroids, improving s/p granix x3  CKD3  Oropharyngeal dysphagia, likely 2/2 multiple prolonged intubations; awaiting NMES  Cutaneous paratracheal ulceration  Gross hematuria, intermittent, on CBI intermittently  B-Cell lymphoma, s/p chemotheraphy 2022, Rituxan maintenance therapy on hold  Seizure disorder; on vimpat  Steroid induced hyperglycemia;on insulin gtt  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  Hx of complex migraines s/p L temporal lobectomy 2007 complicated by below  Hx of complex seizures in setting of #17, on AEDs  COVID-19 infection POA; resolved; s/p multiple courses of antivirals 2/2 persistent infection,  most recent Remdesivir course  completed 3/15, superimposed by recurrent PNA s/p multiple courses Abx  Minimal SAH POA, no interventions required, resolved on repeat CTH        Neuro:  #Alertness  - STOP modafinil 100mg qd   - Delirium precautions  - Will restart precedex today as patient's tachycardia and tachypnea likely have anxiety component  Diagnosis: Analgesia  - PRN Fentanyl; holding at this time unless necessary to avoid excessive sedation     #Metabolic encephalopathy; POA  - Waxing and waning neuro exam since admission  - Most recent repeat CTH 4/5 without acute intracranial abnormality - obtained due to decline in mental status after interval improvement  - MRI brain on 4/7 showed findings suggestive of embolic etiology without significant edema, mass effect or hemorrhagic transformation (suspect septic emboli)  - Electrolytes, sodium, hyperglycemia 2/2 high dose steroids requiring insulin gtt. Ammonia normal. TME may be prolonged 2/2 PNA, possibly worsened by uremic encephalopathy worsened by ELIESER now resolved, uremia improving s/p CRRT   - Bcx 2/2 (3/31) growing fungemia, identified as candida albicans  - Bedside EGD x2 (4/2,4/3) with ulcerated mucosa in the upper third of the esophagus without evidence of hemorrhage   - Cryptococcal ag negative  - Aspergillus galactomannan ag negative  - Tb quantiferon gold indeterminate 2/2 lymphopenia    --  Plan:  - Corticosteroids now weaned to off  - CRRT off with improving uremia   - Follow up serial blood cultures - positive for yeast  - Plan for SATURNINO as below  - S/P 14d Remdesivir course to tx COVID-19, completed 3/15  - S/P 14d Minocycline course to tx stenotrophomonas PNA, completed 3/27   - Avoid PRN fentanyl/sedative meds as able.  - Delirium precautions  - Frequent neurochecks     #CVA due to multiple bilateral foci of acute infarcts   - MRI brain 4/6- multiple small bilateral foci of acute infarcts. No significant edema, mass effect, or hemorrhagic transformation -- suspicious for  septic embolic phenomenon  - Not on AP/AC. Recent TTE 4/1 without vegetation   Plan:  - SATURNINO requested to assess for endocarditis in light of brain MRI concerning for septic emboli in setting of fungemia; bubble study to assess pfo/cardioembolic phenomenon; cardiology consulted  - Avoid ASA due to risk for hemorraghic conversion in setting of possible septic emboli  - Continue statin  - Frequent neurochecks     #Seizure disorder, known history  - S/p partial left temporal lobe resection in 2007 2/2 complex migraines  - On Lamictal 150 bid and Topamax 50mg bid at home  - Last lamotrigine level from 03/02 at 10.7 (therapeutic)  - Home Lamictal switched to Vimpat 3/27 due to worsening pancytopenia, neuro following  - Continue Vimpat 100mg bid maintenance dose  - Continue home topiramate 50mg bid  - Seizure precautions      #Anxiety, known history  -On Effexor 12.5 mg TID     CV:  #Sinus tachycardia  - TTE 3/17 with no major structural dysfunction  - Multifactorial 2/2 deconditioning, dehydration/hypvol, acute on chronic anemia, underlying infectious/inflammatory process, pain, Modafinal-induced  - TTE 4/1 with EF 70%, no WMA, no changes from prior  - TTE with bubble study 4/7 showed LVEF 70% with right to left shunting related to PFO vs intrapulmonary shunt     Pulm:  #Acute hypoxic respiratory failure;  #Bilateral ground glass opacities, improving  - Vent dependent; s/p trach 3/12 after was reintubated 3/11 due to increased WOB  - Persistently COVID+ since admission s/p multiple courses of antivirals (most recent completed 3/15), complicated by recurrent bacterial PNA treated w 10d levaquin (completed 2/25) for MDR PNA; most recent BAL +recurrent stenotrophomona treated with Bactrim, swithced to minocyline 14d course completed 3/28.  - Etiology likely multifactorial including possible COVID pneumonitis vs recurrent PNA vs hypersensitivity pneumonitis 2/2 ?rituxumab. Persistent inflammation likely given patient has been  steroid responsive throughout admission.   - Repeat CXR 3/22 with significant improvement of multifocal PNA. Repeat COVID ag negative x2 3/27  - Follow up anti-Rituximab ab  - Aspergillus galactomannan ag negative  - Tb quantiferon gold indeterminate 2/2 lymphopenia   Plan:  - S/P 14d Remdesivir course to tx COVID-19, completed 3/15  - S/P 14d Minocycline course to tx stenotrophomonas PNA, completed 3/27   - Continue second course minocycline for recurrent steno PNA as below   - Aggressive steroid wean as above  - Follow up serial blood cultures   - Airway clearance protocol   - IV diuresis if vol overloaded  - Continue trach collar, wean O2 as able vs mechanical ventilation for respiratory distress     GI:  #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 2/2 poor wound healing midline incision  - Poor wound healing likely due to high dose steroid therapy s/p wound vac that was removed 3/17 by gen surg now s/p s/p OR 4/5 with surgically created mucus fistula and end ileostomy  Plan:  - Wound vac as per general surgery  - Monitor ostomy pouch output  - Continue Zosyn in setting of fecal contamination of wound prior to OR intervention; - ID following     #Oropharyngeal dysphagia   - Has had multiple and prolonged intubations now s/p trach   - VBS 3/27 oropharyngeal dysphagia  - Speech therapy to begin neuromuscular electrical stim/NMES/VitalStim pending off ventilation. TT speech therapist when indicated.    - Continue tube feeds for now until potential PEG placement pending abdominal wound healing as per Gen Surg     #Esophagitis   - Acute on chronic anemia associated bloody NG output on 4/3  - Uremic, worsening suggestive of UGIB given relatively stable Cr  - Bedside EGD x2 (4/2,4/3) Ulcerated mucosa in the upper third of the esophagus without evidence of hemorrhage   Plan:  HSV/CMV/candida from GI sample negative  Continue PPI IV bid  Monitor NGT output, stool output      :  #Uremia  - ?catabolic from steroids, may also have ?slow UGIB in setting of prolonged steroids though no overt s/s of GIB and H&H stable since 3/23 and patient is on ppi  - Trending up   - EGD without active source of bleedig   - FEurea suggestive of intrinsic pathology   - Steroids now off  - Improved with CRRT, now on hold  - Nephrology following, appreciate recommendations  - BMP daily        F/E/N:  F: intermittent IVF boluses   E: monitor and replete for goal K>4, P>3, Mg>2  N: tube feeds with vital 1.5; 45 cc/h, Prosource liquid protein to 1 packet BID, Refugio BID to support wound healing         Heme/Onc:  #Pancytopenia, improving with intermittent plt/prbc's transfusion, s/p Filgastrim x3  - In setting of hx of B cell lymphoma, prior chemo, Rituxan therapy, prior abx and present meds including lamictal  - Hemo onc consulted, empirically given Filgastrim 300mg qd x3d (complated 3/30); IR bone marrow bx deferred by heme-onc   - Lamictal switched to Vimpat in consultation with neuro as above due to potential contribution to pancytopenia  - Trend CBC and diff daily, neutropenic precautions for ANC <500     #Acute on chronic anemia  - Baseline Hgb ~7-8. S/P 2U PRBCs 3/17 after repeat Hgb 5.3, total of 6U transfused since admission.  - Patient had significant blood loss from ostomy site 3/20, resulting in hemorraghic shock, improved with blood transfusion; hemostasis achieved after bleeding source identified and sutured. Another large vol danie bloody output from osotomy on 3/21, bleeding source within ostomy site, sutured.    - Also having intermittent vaginal bleeding  - EGD 4/3,4/4 without active bleeding  - ?Worsened by impaired marrow response liekly 2/2 persistent inflammation due to low retic index ~1 prior to most recent transfusions; normocytic with normal B12/folate levels; MCV<100. Iatrogenic cause likely contributing 2/2 frequent blood draws; chronic anemia likely persistent inflammatory  state/CKD/lymphoma in setting of elevated ferritin of 974. SPEP/UPEP negative. Hemolysis workup negative.   Plan:  - Routine monitoring ostomy output, if bleeding, apply direct pressure and contact gen surg STAT for hemostasis .  - Continue tube feeds/nutritional support  - Give FFP/vitamin K to correct INR as indicated  - Trend CBC, transfuse Transfuse with leukoreduced and irradiated RBCs to maintain Hgb>7  - See plan under pancytopenia      #Thrombocytopenia  - Likely multifactorial including imparied production from marrow dysfunction in setting of persistent inflammation; RI low suggestive of hyperproliferation, hx of B-cell lymphoma, recent infections including persistent COVID, PNA treated, CMV negative. No known history of vWD/congenital disorders. No liver dysfunction; recent hepatic profile unremarkable. HIT workup negative, Hemolytic workup negative. No s/s DIC. No mechanical valves. ?Primary ITP.  Plan:  - Filgrastim 300mg x3 to aid in plt recovery  - Transfuse with leukoreduced and irradiated PLTs if count <20k due to increased risk of bleeding   - Goal >50k if bleeding; goal >20K if no bleeding  - Hold Lovenox for DVT ppx if Plt<50k.  - See plan under pancytopenia      # Lymphopenia with bandemia  - In setting of high dose steroids, now off  - Severely depressed immunoglobulins inclding IgG, IgA, IgM  - At risk for further infections  - Filgrastim 300mg x3 to aid in plt recovery  - Contact and airborne precautions     #B cell lymphoma  - Follows with heme[facility]onc at [facility]  - S/P 6 cycles of chemotherapy in 20222  - Maintained on Rituxan for 2 year course PTA, started May 2022, last dose was 12/2024, treatment currently on hold in setting of persistent COVID-19 infection  Anti-Ritux Ab negative  Plan:  - Holding rituximab in setting of persistent COVID-19 infection, now resolved.  ?resume rituxubmab pending clinical stability & anti-ritux ab status in consultation with heme-onc  - Thyroid FNA planned for  4/9     DVT ppx: Lovenox     Endo:  #Steroid hyperglycemia and diabetes mellitus type 2, A1c at 6.6 from 01/2024  - Goal -180  - Insulin gtt     #R thyroid gland enlargement  - Seen on CT 4/5  - Follow up US 4/5- Limited exam due to overlying tracheostomy tube and central line catheter. Heterogeneous lobular appearance to the remaining thyroid gland, nonspecific, question sequela of thyroiditis. No discrete nodule identified.   T4/TSH unremarkable   Plan: IR consulted for FNA, bedside Bx tentatively planned for 4/9     ID:  #Fungemia   BCx growing yeast - most recently on 4/8, ID panel- candida albicans  In setting of severe lymphopenia and severely depressed immunoglobulins   Septic emboli noted, planning for SATURNINO as above  Ophtho consulted, no evidence of ophthalmic endophthalmitis   Plan:   Hold further micofungin doses as per ID (d/c'd 4/5)  Continue fluconazole 800 mg qd (ELIESER resolved on CRRT)  Avoid broad spectrum abx as it can contribute to fungal growth, however requiring Zosyn in setting of fecal contamination of woundd  Central/midlines changed after first positive Bcx for fungemia   F/U Repeat serial Bcx (q48h) to ensure clearance    ID following; all Abx will need dose adjustments if renal function worsens     #PCP ppx  On IV bactrim DS 2/2 prolonged high dose steroid therapy      #Recurrent bacterial pneumonia  - Patient has completed multiple treatment courses of remdesivir throughout hospitalization in addition to 7 days of ceftriaxone.  - BAL cultures in 2/2024 positive for MDR Pseudomonas and stenotrophomonas treated with 10d course of Levaquin  - CT C/A/P 3/10 with persistent groundglass opacities and changes suggestive of sequelae of COVID, prior infections.  Completed 10d course of cefepime for broad spectrum coveragge (completed (3/13)  - Repeat BAL cultures from 3/11 showing 4+ growth Stenotrophomonas maltophilia. Could be colonization given prior BAL growth of same, however due to recent  intubation with prolonged corticosteroid theraphy, will begin treating as true pathogen. Patient otherwise remains afebrile, no leukocytosis. CRP with down trending.  Previously on Bactrim from 3/13 to 3/18, discontinued due to worsening ELIESER  Plan:  - S/P 14d Minocycline course to tx stenotrophomonas PNA, completed 3/27   - IV steroids as above  - Continue minocycline sputum Cx growing steno (3/31)         MSK/Skin:  #Midline incision wound with poor wound healing-  - General surgery performed staple removal of the patient's midline incision on 3/12 with some skin signs appreciated at the inferior aspect of the wound without obvious pus drainage. Overnight 3/13, wound dehiscence progressed requiring general surgery reevaluation. Red/brown aspirate noted, fascia intact. Wet-to-dry dressings in place. General surgery following for next Epson wound care.  - Poor wound healing in setting of high-dose steroid therapy.  No  exam no obvious signs of infection at this time.   - S/P wound vac replacement 3/13 as per gen surgery. No active concerns for cellulitis.  Plan:  - Wound VAC management as per general surgery  - Wound care for peritracheostomy wound  - Abdominal wound care as per general surgery  - Routine monitoring     #Critical illness myopathy  - Likely exacerbated by high-dose steroid therapy  Plan:  - Continue to wean steroids as above  - Aggressive PT/OT once clinically stable        Disposition: Critical care    ICU Core Measures     Vented Patient  VAP Bundle  VAP bundle ordered     A: Assess, Prevent, and Manage Pain Has pain been assessed? NA  Need for changes to pain regimen? NA   B: Both Spontaneous Awakening Trials (SATs) and Spontaneous Breathing Trials (SBTs) Plan to perform spontaneous awakening trial today? N/A   Plan to perform spontaneous breathing trial today? Yes   Obvious barriers to extubation? No   C: Choice of Sedation RASS Goal: 0 Alert and Calm  Need for changes to sedation or analgesia  regimen? Yes   D: Delirium CAM-ICU: Unable to perform secondary to Acute cognitive dysfunction   E: Early Mobility  Plan for early mobility? No   F: Family Engagement Plan for family engagement today? Yes       Antibiotic Review: Patient on appropriate coverage based on culture data.     Review of Invasive Devices:    Ortiz Plan: Continue for accurate I/O monitoring for 48 hours  Central access plan: Patient has multiple central venous catheters.       Prophylaxis:  VTE VTE covered by:  enoxaparin, Subcutaneous, 40 mg at 04/07/24 0956       Stress Ulcer  covered bypantoprazole (PROTONIX) injection 40 mg [[id number]]        Significant 24hr Events     Hospital course: 58yo F with history of B cell lymphoma s/p chemo in 1/2022 on rituximab, epilepsy from complex migraines s/p temporal lobectomy in 2007, CKD3. Initially in [facility] with encephalopathy 2/2 COVID. CT showed GGO concern for COVID pneumonia vs hypersensitivity pneumonitis. vEEG negative. Developed acute hypoxic respiratory failure requiring high dose steroids but couldn't wean because it would cause her respiratory status to recurrently decline, causing prolonged intubation for 3 weeks until she was eventually trach and PEG on 3/15. 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 She has had poor wound healing because of the prolonged steroids, which was then complicated by bleeding and hemorrhagic shock requiring 15 units of pRBC. Because of poor wound healing, her two abdominal surgical sites progressed to form one large abdominal wall defect, which has further been complicated by fecal contents draining into her abdomen. During this, she became progressively neutropenic which contributed to repeat pneumonia with cultures positive for Stenotrophomonas maltophilia for which she is now on minocycline per ID (as well as IV bactrim for PCP prophylaxis). Blood and urine cultures also noted to be positive for Candida  albicans fungemia, for which she has been on fluconazole. Unfortunately, her mental status has declined again (after interval improvement); MRI brain now positive for evidence of septic embolic disease. TTE did not show vegetations but did show PFO and RLE Duplex revealed DVT for which IR is currently consulted for IVC filter placement. Labs at one point showed significant uremia for which GI was consulted due to concern for upper GI bleed; EGD showed esophageal ulcerations prompting concern for CMV vs HSV? esophagitis (path pending). SATURNINO (to evaluate for endocarditis given concern for septic emboli) is on hold until this pathology is back. Increased thyromegaly noted which has been compressing her airway; originally concerned for thyroiditis given uniform enhancement on CT but repeat imaging has shown a hypovascular mass for which IR will attempt to biopsy at time of IVC filter placement. Patient was at one point placed on CRRT due to concern for uremia contributing to altered mental status, which is now off with kidney function remaining stable.    24hr events: Yesterday, patient was taken off CRRT and pending response to IV fluids prior to HD consideration. Her steroids have now been weaned to off (last day was 4/7) and discussions were had with IR about thyroid FNA today. TTE with bubble study yesterday was positive for R to L shunting concerning for PFO vs intrapulmonary shunt.     Subjective     Review of Systems   Unable to perform ROS: Patient unresponsive        Objective                            Vitals I/O      Most Recent Min/Max in 24hrs   Temp 99.1 °F (37.3 °C) Temp  Min: 98.1 °F (36.7 °C)  Max: 100.9 °F (38.3 °C)   Pulse (!) 121 Pulse  Min: 114  Max: 138   Resp (!) 23 Resp  Min: 20  Max: 27   /67 BP  Min: 121/67  Max: 169/84   O2 Sat 99 % SpO2  Min: 97 %  Max: 100 %      Intake/Output Summary (Last 24 hours) at 4/8/2024 0640  Last data filed at 4/8/2024 0415  Gross per 24 hour   Intake 2181.85  ml   Output 1060 ml   Net 1121.85 ml       Diet Enteral/Parenteral; Tube Feeding No Oral Diet; Vital 1.5; Continuous; 45; Prosource Protein Liquid - One Packet; OD; Refugio - One Packet; BID; 50; Water; Every 4 hours    Invasive Monitoring   Arterial Line  Amelia /60  No data recorded   MAP 83 mmHg  No data recorded           Physical Exam   Physical Exam  Vitals and nursing note reviewed.   Skin:     General: Skin is warm and dry.   HENT:      Head: Normocephalic and atraumatic.   Cardiovascular:      Rate and Rhythm: Regular rhythm. Tachycardia present.      Heart sounds: S1 normal and S2 normal. No murmur heard.  Musculoskeletal:      Cervical back: Neck supple.      Right lower leg: No edema.      Left lower leg: No edema.   Abdominal: General: Bowel sounds are normal.      Palpations: Abdomen is soft.   Constitutional:       General: She is in acute distress.      Appearance: Normal appearance. She is ill-appearing.      Interventions: She is intubated.   Pulmonary:      Effort: She is intubated.      Comments: Coarse breath sounds bilaterally  Neurological:      Mental Status: She is confused and unresponsive.            Diagnostic Studies      EKG:   Imaging:  I have personally reviewed pertinent reports.       Medications:  Scheduled PRN   chlorhexidine, 15 mL, Q12H SARTHAK  enoxaparin, 40 mg, Q24H SARTHAK  fluconazole, 800 mg, Q24H  lacosamide, 100 mg, Q12H  levalbuterol, 1.25 mg, TID  minocycline, 200 mg, Q12H  modafinil, 100 mg, Daily  nystatin, , BID  pantoprazole, 40 mg, Q12H SARTHAK  piperacillin-tazobactam, 4.5 g, Q8H  polyethylene glycol, 17 g, Daily  sodium chloride, 2 spray, BID  sodium chloride, 4 mL, TID  sulfamethoxazole-trimethoprim, 1 tablet, Once per day on Monday Wednesday Friday      acetaminophen, 650 mg, Q6H PRN  fentaNYL, 25 mcg, Q1H PRN  ondansetron, 4 mg, Q6H PRN  sodium chloride, 1 Application, Q1H PRN       Continuous    insulin regular (HumuLIN R,NovoLIN R) 1 Units/mL in sodium chloride 0.9  % 100 mL infusion, 0.3-21 Units/hr, Last Rate: 2 Units/hr (04/08/24 0616)         Labs:    CBC    Recent Labs     04/06/24  1814 04/07/24  0508 04/07/24  1834 04/08/24  0505   WBC 14.44* 15.69* 16.35* 16.24*   HGB 8.0* 7.8* 6.7* 7.6*   HCT 25.2* 23.9* 21.2* 23.5*   PLT 58* 59* 48* 36*   BANDSPCT 4 18*  --   --      BMP    Recent Labs     04/07/24  1834 04/08/24  0505   SODIUM 137 141   K 4.4 4.4    107   CO2 21 22   AGAP 10 12   BUN 52* 55*   CREATININE 0.84 0.86   CALCIUM 10.0 10.2       Coags    Recent Labs     04/07/24  0508 04/08/24  0505   INR 1.40* 1.34*        Additional Electrolytes  Recent Labs     04/07/24  0508 04/08/24  0505   MG 1.9 2.0   PHOS 4.6* 4.8*   CAIONIZED  --  1.46*          Blood Gas    No recent results  No recent results LFTs  Recent Labs     04/07/24  0508   ALT 37   AST 24   ALKPHOS 900*   ALB 2.3*   TBILI 1.50*       Infectious  No recent results  Glucose  Recent Labs     04/06/24 1814 04/07/24  0508 04/07/24  1834 04/08/24  0505   GLUC 203* 249* 187* 107               Livan Morfin, DO

## 2024-04-08 NOTE — WOUND OSTOMY CARE
Wound care consulted for patient who is well known to wound care team and is following and orders are placed. Wound care will see patient tomorrow for weekly wound assessment.       Molly LAYNEN, RN, CWOCN

## 2024-04-08 NOTE — UTILIZATION REVIEW
Continued Stay Review    Date: 04/06, 04/07, 04/07                          Current Patient Class: IP  Current Level of Care: Level 2 stepdown/HOT    HPI:59 y.o. female initially admitted on 01/10     Assessment/Plan:   04/06 pt was given albumin 12.5%(250cc) for vol resusc due to MAP 60, hypotension. On CRRT, once completed today will hold off and monitor labs. If UOP remains low or BUN worsens will plan for HD on Monday. Check MRI brain today. Restart modafinil. Continue fluconazole, zosyn, minocycline, and bactrim PCP prophylaxis. F/up gastric biopsy/microbiology. Plan for last day of steroids tomorrow.   Continue to follow new blood cultures. Consult IR for enlarging thyroid mass/cyst. Monitor INR. Continue current vent settings, suction as necessary. Tube feeds on hold, surgery to evaluate if operative intervention is needed again. If not, restart tube feeds on nepro diet.    Per Neuro: CVA d/t embolism  Recommend SATURNINO, if a SATURNINO is unable to be completed due to the patient's clinical status then in order to evaluate for a PFO would recommend obtaining a bubble study with TCD if able and then if this is unable to be completed would then recommend a TTE with bubble study. Continue Neuro checks. Maintain glucose <180, SSI. Cont Statin. Cont tele.    IR Consult: CT now with hypovascular mass occupying the lobe which is compressing the airway and there is concern for lymphoma given her history. Will attempt bedside thyroid biopsy.     Gen Surg Wound Check Notes:  A total of three piece of black foam and three of white foam were utilized. The vac was connected to -50 suction. An ostomy appliance was placed overlying the ostomy.           04/07 No acute vn events. Now off CRRT, urine output low, will give IVF bolus. Will reassess tomorrow for dialysis need. Continue fluconazole for fungemia. Continue zosyn, minocycline, and bactrim for PCP prophylaxis. Last day of steroids today, will d/c. Restart insulin drip.  Continue tube feeds. F/up gastric biopsy. F/up new blood cultures. plan for thyroid FNA tomorrow w/ IR. Continue to monitor INR and platelets, keep platelets >50K. Continue current vent settings. Will plan for TTE with bubble study today, if poor quality will need SATURNINO. Check lower extremity dopplers     04/08 Pt's steroids have been weaned off. TTE with bubble study yesterday was positive for R to L shunting concerning for PFO vs intrapulmonary shunt. Remain on vent w/ trach. Will restart precedex today d/t pt tachypneic and tachycardic likely anxiety component. Stop modafinil. Cont statin. Neuro checks. Daily BMP. Given IV Albumin. Cont IV Zosyn. Cont Diflucan IV. IV Vimpat. Cont Minocycline. Cont Bactrim.     IR Consult: bilateral acute multifocal embolic strokes and RLE DVT with anticoagulation deferred due to concern for septic emboli. Plan for IR IVC placement and possible attempt IR thyroid biopsy. Timing dependent on IR schedule. NPO with enteral nutrition held 4 hours prior to procedure.        Vital Signs:   Date/Time Temp Pulse Resp BP MAP (mmHg) SpO2 FiO2 (%) O2 Device Patient Position - Orthostatic VS   04/08/24 1400 99.3 °F (37.4 °C) 118 Abnormal  20 106/57 78 98 % -- -- --   04/08/24 1200 99.7 °F (37.6 °C) 120 Abnormal  21 109/53 76 98 % -- -- Lying   04/08/24 1000 99.9 °F (37.7 °C) 124 Abnormal  30 Abnormal  107/59 75 97 % -- -- Lying   04/08/24 0800 99.7 °F (37.6 °C) 130 Abnormal  24 Abnormal  116/59 84 99 % -- Ventilator  Lying   O2 Device: Trach at 04/08/24 0800   04/08/24 0716 -- -- -- -- -- 100 % -- -- --   04/08/24 0600 99.1 °F (37.3 °C) 121 Abnormal  23 Abnormal  121/67 90 99 % -- -- --   04/08/24 0500 98.8 °F (37.1 °C) 122 Abnormal  26 Abnormal  139/66 95 99 % -- -- --   04/08/24 0400 98.6 °F (37 °C) 121 Abnormal  24 Abnormal  140/70 97 99 % -- -- --   04/08/24 0300 98.4 °F (36.9 °C) 122 Abnormal  22 137/76 100 98 % -- -- --   04/08/24 0200 98.4 °F (36.9 °C) 120 Abnormal  23 Abnormal  149/81  108 100 % -- -- --   04/08/24 0100 98.2 °F (36.8 °C) 121 Abnormal  22 137/72 98 98 % -- -- --   04/08/24 0000 98.1 °F (36.7 °C) 118 Abnormal  21 142/74 102 100 % -- -- --   04/07/24 2300 98.4 °F (36.9 °C) 114 Abnormal  23 Abnormal  138/73 99 99 % -- -- --   04/07/24 2200 98.6 °F (37 °C) 114 Abnormal  20 169/84 115 99 % -- -- --   04/07/24 2100 98.8 °F (37.1 °C) 117 Abnormal  20 155/82 113 99 % -- -- --   04/07/24 2000 98.8 °F (37.1 °C) 116 Abnormal  23 Abnormal  153/88 115 99 % -- -- --   04/07/24 1900 99.3 °F (37.4 °C) 121 Abnormal  22 165/91 121 99 % -- -- --   04/07/24 1800 -- 117 Abnormal  21 150/82 110 100 % -- -- --   04/07/24 1700 -- 122 Abnormal  22 139/75 101 100 % -- -- --   04/07/24 1600 100 °F (37.8 °C) 123 Abnormal  25 Abnormal  148/80 108 99 % -- -- Lying   04/07/24 1500 100.2 °F (37.9 °C) 123 Abnormal  23 Abnormal  143/67 96 99 % -- -- --   04/07/24 1400 100.6 °F (38.1 °C) Abnormal  123 Abnormal  22 141/73 100 100 % -- -- --   04/07/24 1300 100.9 °F (38.3 °C) Abnormal  128 Abnormal  23 Abnormal  132/68 94 97 % -- -- --   04/07/24 1200 100 °F (37.8 °C) 134 Abnormal  22 144/69 99 98 % -- -- Lying   04/07/24 1110 -- 137 Abnormal  -- 143/67 -- -- -- -- --   04/07/24 1100 -- 132 Abnormal  23 Abnormal  128/62 87 100 % -- -- --   04/07/24 1000 -- 138 Abnormal  27 Abnormal  135/72 97 98 % -- -- --   04/07/24 0900 -- 132 Abnormal  25 Abnormal  147/75 103 98 % -- -- --   04/07/24 0800 98.7 °F (37.1 °C) 136 Abnormal  27 Abnormal  124/64 87 97 % 40 Ventilator Lying   04/07/24 0700 -- 137 Abnormal  27 Abnormal  143/67 96 97 % -- -- --   04/07/24 0600 -- 135 Abnormal  28 Abnormal  138/74 98 97 % -- -- --   04/07/24 0500 -- 134 Abnormal  25 Abnormal  133/73 97 97 % -- -- --   04/07/24 0400 98.4 °F (36.9 °C) 132 Abnormal  23 Abnormal  149/82 108 96 % -- -- --   04/07/24 0300 -- 130 Abnormal  22 128/69 90 97 % -- -- --   04/07/24 0200 -- 132 Abnormal  24 Abnormal  129/63 87 98 % -- -- --   04/07/24 0100 -- 136  Abnormal  25 Abnormal  132/68 92 97 % -- -- --   04/07/24 0000 98.6 °F (37 °C) 128 Abnormal  20 106/63 79 98 % -- -- --   04/06/24 2300 -- 134 Abnormal  23 Abnormal  128/73 94 97 % -- -- --   04/06/24 2200 -- 138 Abnormal  28 Abnormal  124/74 93 97 % -- -- --   04/06/24 2100 -- 136 Abnormal  26 Abnormal  134/75 98 97 % -- -- --   04/06/24 2000 98.2 °F (36.8 °C) 132 Abnormal  24 Abnormal  139/75 101 98 % -- -- --   04/06/24 1900 -- 132 Abnormal  29 Abnormal  136/73 99 97 % -- -- --   04/06/24 1800 -- 128 Abnormal  22 143/77 104 98 % -- -- --   04/06/24 1700 -- 124 Abnormal  21 134/76 99 100 % -- -- --   04/06/24 1600 -- 122 Abnormal  20 133/74 98 100 % -- -- Lying   04/06/24 1500 -- 122 Abnormal  20 137/72 97 100 % -- -- --   04/06/24 1400 -- 122 Abnormal  20 143/82 107 100 % -- -- --   04/06/24 1300 97.5 °F (36.4 °C) 119 Abnormal  17 139/82 103 100 % -- -- --   04/06/24 1200 97.5 °F (36.4 °C) 118 Abnormal  19 139/66 95 100 % -- -- Lying   04/06/24 1100 97.3 °F (36.3 °C) Abnormal  121 Abnormal  24 Abnormal  145/76 99 100 % -- -- --   04/06/24 1000 97.7 °F (36.5 °C) 120 Abnormal  20 139/66 95 100 % -- -- --   04/06/24 0900 98.2 °F (36.8 °C) 122 Abnormal  20 119/58 84 100 % -- -- --   04/06/24 0800 98.6 °F (37 °C) 126 Abnormal  22 136/63 90 100 % 40 Ventilator Lying   04/06/24 0700 99.1 °F (37.3 °C) 124 Abnormal  24 Abnormal  104/58 77 100 % -- -- --   04/06/24 0600 99.5 °F (37.5 °C) 128 Abnormal  31 Abnormal  81/51 Abnormal  60 Abnormal  96 % -- -- --   04/06/24 0500 99.1 °F (37.3 °C) 133 Abnormal  33 Abnormal  95/56 70 96 % -- -- --   04/06/24 0400 98.2 °F (36.8 °C) 133 Abnormal  31 Abnormal  107/60 76 96 % -- -- --   04/06/24 0300 97.2 °F (36.2 °C) Abnormal  128 Abnormal  28 Abnormal  118/64 85 97 % -- -- --   04/06/24 0200 96.3 °F (35.7 °C) Abnormal  117 Abnormal  20 105/56 74 98 % -- -- --   04/06/24 0100 96.9 °F (36.1 °C) Abnormal  114 Abnormal  25 Abnormal  104/60 77 99 % -- -- --     Pertinent Labs/Diagnostic  Results:   04/06 MRI brain:  1.  Multiple small bilateral foci of acute infarcts as described suggesting embolic etiology. No significant edema, mass effect, or hemorrhagic transformation.  2.  Partial lack of sulcal CSF suppression in the FLAIR sequence noted in the supratentorial and infratentorial brain. This could be artifactual/technique related; however, inflammatory or infectious process is not excluded. Correlate with clinical   assessment.  3.  Stable postoperative appearance from prior partial left temporal lobe resection.  4.  Complete opacification of bilateral mastoids air cells and middle ears.    VAS venous duplex lower limb b/l  RIGHT LOWER LIMB:  Evidence of subacute, occlusive deep vein thrombosis identified in the paired  posterior tibial veins.  No evidence of superficial thrombophlebitis noted.  Doppler evaluation shows a normal response to augmentation maneuvers..  Popliteal, posterior tibial and anterior tibial arterial Doppler waveform's are  triphasic.     LEFT LOWER LIMB:  No evidence of acute or chronic deep vein thrombosis.  No evidence of superficial thrombophlebitis noted.  Doppler evaluation shows a normal response to augmentation maneuvers.  Popliteal, posterior tibial and anterior tibial arterial Doppler waveform's are  triphasic.    04/07 ECHO:    Left Ventricle: Left ventricular cavity size is normal. Wall thickness is mildly increased. There is mild concentric hypertrophy. The left ventricular ejection fraction is 70%. Systolic function is hyperdynamic.    Atrial Septum: There is  right to left shunting noted using saline contrast at rest and with provocation (abdominal compression) which may be related to a PFO or intrapulmonary shunt.    Aorta: The aortic root is mildly dilated. The aortic root is 4.20 cm.    Technically very difficult study.    No definite evidence of valvular vegetations on the study.        Results from last 7 days   Lab Units 04/08/24  0500 04/07/24  8794  04/07/24  0508 04/06/24  1814 04/06/24  0813   WBC Thousand/uL 16.24* 16.35* 15.69* 14.44* 14.57*   HEMOGLOBIN g/dL 7.6* 6.7* 7.8* 8.0* 8.2*   HEMATOCRIT % 23.5* 21.2* 23.9* 25.2* 25.6*   PLATELETS Thousands/uL 36* 48* 59* 58* 58*   BANDS PCT %  --   --  18* 4 42*         Results from last 7 days   Lab Units 04/08/24  0505 04/07/24  1834 04/07/24  0508 04/06/24  1814 04/06/24  0503 04/06/24  0100 04/06/24  0100 04/06/24  0031 04/05/24  1841 04/05/24  1158   SODIUM mmol/L 141 137 138 136 137   < >  --   --  141 140   POTASSIUM mmol/L 4.4 4.4 4.9 4.8 4.8   < >  --   --  4.7 4.4   CHLORIDE mmol/L 107 106 105 104 104   < >  --   --  108 109*   CO2 mmol/L 22 21 21 19* 20*   < >  --   --  20* 20*   ANION GAP mmol/L 12 10 12 13 13   < >  --   --  13 11   BUN mg/dL 55* 52* 46* 38* 40*   < >  --   --  51* 65*   CREATININE mg/dL 0.86 0.84 0.79 0.62 0.62   < >  --   --  0.76 0.88   EGFR ml/min/1.73sq m 74 76 82 99 99   < >  --   --  86 72   CALCIUM mg/dL 10.2 10.0 10.2 10.2 10.0   < >  --   --  9.8 10.0   CALCIUM, IONIZED mmol/L 1.46*  --   --   --  1.43*  --   --  1.41* 1.41* 1.38*   MAGNESIUM mg/dL 2.0  --  1.9  --  2.0  --  2.2  --  1.9 2.0   PHOSPHORUS mg/dL 4.8*  --  4.6*  --  3.9  --  4.1  --  4.5 4.6*    < > = values in this interval not displayed.     Results from last 7 days   Lab Units 04/07/24  0508 04/05/24  0522 04/04/24  0752 04/03/24  1330 04/03/24  0430   AST U/L 24 20 21  --  16   ALT U/L 37 32 34  --  29   ALK PHOS U/L 900* 346* 381*  --  210*   TOTAL PROTEIN g/dL 4.3* 5.2* 4.7*  --  5.1*   ALBUMIN g/dL 2.3* 2.4* 2.2*  --  2.4*   TOTAL BILIRUBIN mg/dL 1.50* 1.42* 1.46*  --  1.22*   BILIRUBIN DIRECT mg/dL 1.00* 0.88* 0.96*  --   --    AMMONIA umol/L  --   --   --  28  --      Results from last 7 days   Lab Units 04/08/24  1424 04/08/24  1145 04/08/24  1038 04/08/24  0956 04/08/24  0747 04/08/24  0615 04/08/24  0416 04/08/24  0201 04/08/24  0026 04/07/24  2312 04/07/24  1755 04/07/24  1601   POC GLUCOSE mg/dl  127 108 83 66 88 110 128 162* 191* 195* 179* 174*     Results from last 7 days   Lab Units 04/08/24  0505 04/07/24  1834 04/07/24  0508 04/06/24  1814 04/06/24  0503 04/06/24  0100 04/05/24  1841 04/05/24  1158 04/05/24  0522 04/04/24  2312 04/04/24  1802 04/04/24  0425   GLUCOSE RANDOM mg/dL 107 187* 249* 203* 144* 140 127 98 92 73 117 107             Beta- Hydroxybutyrate   Date Value Ref Range Status   04/03/2024 1.10 (H) 0.02 - 0.27 mmol/L Final      Results from last 7 days   Lab Units 04/08/24  1155   PH ART  7.339*   PCO2 ART mm Hg 37.8   PO2 ART mm Hg 92.8   HCO3 ART mmol/L 19.9*   BASE EXC ART mmol/L -5.4   O2 CONTENT ART mL/dL 9.6*   O2 HGB, ARTERIAL % 95.5   ABG SOURCE  Radial, Right     Results from last 7 days   Lab Units 04/04/24  0647 04/04/24  0129   PH NICA  7.284* 7.285*   PCO2 NICA mm Hg 38.5* 37.1*   PO2 NICA mm Hg 64.3* 87.6*   HCO3 NICA mmol/L 17.8* 17.2*   BASE EXC NICA mmol/L -8.1 -8.7   O2 CONTENT NICA ml/dL 10.2 10.8   O2 HGB, VENOUS % 90.3* 94.7*         Results from last 7 days   Lab Units 04/05/24  1158 04/03/24  0430   CK TOTAL U/L 25* 24*             Results from last 7 days   Lab Units 04/08/24  0505 04/07/24  0508 04/06/24  0813 04/04/24  0425 04/04/24  0118 04/03/24  0430 04/01/24  1553   PROTIME seconds 16.4* 17.0* 17.4*   < > 21.6*   < > 20.1*   INR  1.34* 1.40* 1.45*   < > 1.92*   < > 1.75*   PTT seconds  --   --   --   --  40*  --  35    < > = values in this interval not displayed.     Results from last 7 days   Lab Units 04/06/24  0503   TSH 3RD GENERATON uIU/mL 0.017*     Results from last 7 days   Lab Units 04/05/24  0522   PROCALCITONIN ng/ml 5.61*     Results from last 7 days   Lab Units 04/04/24  2312 04/04/24  1001 04/04/24  0118 04/03/24  0805   LACTIC ACID mmol/L 0.7 1.1 1.7 1.0                         Results from last 7 days   Lab Units 04/05/24  0555 04/04/24  0555 04/03/24  0555 04/02/24  0555   UNIT PRODUCT CODE  [id number]  [id number]  [id number] [id number]   [id number] [id number]  [id number]  [id number]   UNIT NUMBER  [id number]  [id number]  [id number]  [id number]  [id number]  [id number]   UNITABO  A  A  A A O  A O  A  O   UNITRH  NEG  POS  NEG NEG POS  POS POS  NEG  POS   CROSSMATCH  Compatible  Compatible  --   --  Compatible   UNIT DISPENSE STATUS  Presumed Trans  Presumed Trans  Presumed Trans Presumed Trans Presumed Trans  Presumed Trans Presumed Trans  Presumed Trans  Presumed Trans   UNIT PRODUCT VOL mL 350  250  350 250 268  280 253  350  300             Results from last 7 days   Lab Units 04/07/24  0508   CRP mg/L 165.6*   SED RATE mm/hour 48*             Results from last 7 days   Lab Units 04/06/24  0021 04/04/24  0129 04/03/24  1416   CLARITY UA  Extra Turbid Turbid Turbid   COLOR UA  Yellow Yellow Yellow   SPEC GRAV UA  1.024 1.017 1.017   PH UA  6.0 6.0 5.5   GLUCOSE UA mg/dl Negative Negative Negative   KETONES UA mg/dl Trace* Negative Negative   BLOOD UA  Large* Large* Large*   PROTEIN UA mg/dl 200 (2+)* 70 (1+)* 100 (2+)*   NITRITE UA  Negative Negative Negative   BILIRUBIN UA  Negative Negative Negative   UROBILINOGEN UA (BE) mg/dl <2.0 <2.0 <2.0   LEUKOCYTES UA  Large* Moderate* Large*   WBC UA /hpf Innumerable* Innumerable* Innumerable*   RBC UA /hpf Innumerable* Innumerable* Innumerable*   BACTERIA UA /hpf Moderate* None Seen Occasional   EPITHELIAL CELLS WET PREP /hpf None Seen None Seen None Seen   SODIUM UR   --   --  36   CREATININE UR mg/dL  --   --  15.8                                 Results from last 7 days   Lab Units 04/06/24  0453 04/06/24  0021 04/04/24  0120   BLOOD CULTURE  No Growth at 48 hrs.  No Growth at 48 hrs.  --  No Growth After 4 Days.  No Growth After 4 Days.   GRAM STAIN RESULT   --   --  Yeast*   URINE CULTURE   --  >100,000 cfu/ml Candida albicans*  --              Results from last 7 days   Lab Units 04/02/24  8789    VANCOMYCIN RM ug/mL 38.3*       Medications:   Scheduled Medications:  chlorhexidine, 15 mL, Mouth/Throat, Q12H SARTHAK  fluconazole, 800 mg, Intravenous, Q24H  lacosamide, 100 mg, Intravenous, Q12H  minocycline, 200 mg, Per NG Tube, Q12H  nystatin, , Topical, BID  pantoprazole, 40 mg, Intravenous, Q12H SARTHAK  piperacillin-tazobactam, 4.5 g, Intravenous, Q8H  polyethylene glycol, 17 g, Per NG Tube, Daily  sodium chloride, 2 spray, Each Nare, BID  sodium chloride, 4 mL, Nebulization, TID  sulfamethoxazole-trimethoprim, 1 tablet, Per NG Tube, Once per day on Monday Wednesday Friday      Continuous IV Infusions:  dexmedetomidine, 0.1-0.7 mcg/kg/hr, Intravenous, Titrated  insulin regular (HumuLIN R,NovoLIN R) 1 Units/mL in sodium chloride 0.9 % 100 mL infusion, 0.3-21 Units/hr, Intravenous, Titrated      PRN Meds:  acetaminophen, 650 mg, Oral, Q6H PRN  fentaNYL, 50 mcg, Intravenous, Q1H PRN  ondansetron, 4 mg, Intravenous, Q6H PRN  sodium chloride, 1 Application, Nasal, Q1H PRN        Discharge Plan: D    Network Utilization Review Department  ATTENTION: Please call with any questions or concerns to [phone] and carefully listen to the prompts so that you are directed to the right person. All voicemails are confidential.   For Discharge needs, contact Care Management DC Support Team at [phone] opt. 2  Send all requests for admission clinical reviews, approved or denied determinations and any other requests to dedicated fax number below belonging to the [facility] where the patient is receiving treatment. List of dedicated fax numbers for the Facilities:  FACILITY NAME UR FAX NUMBER   ADMISSION DENIALS (Administrative/Medical Necessity) [phone]   DISCHARGE SUPPORT TEAM (NETWORK) [phone]   PARENT CHILD HEALTH (Maternity/NICU/Pediatrics) [phone]   [facility] [phone]   [facility] [phone]   [facility] [phone]    [facility] [phone]   formerly [facility] [phone]   [facility] [phone]   [facility] [phone]   [facility] [phone]   [facility] [phone]   [facility] [phone]   [facility] [phone]   [facility] [phone]

## 2024-04-08 NOTE — PROGRESS NOTES
NEPHROLOGY PROGRESS NOTE   Carla Hunt 59 y.o. female MRN: [id number]  [facility]: [id number]      ASSESSMENT & PLAN:  1 ELIESER, as per previous note suspected secondary prior ELIESER, component of ATN, Bactrim use, GI bleeding.  Patient initially on CRRT, currently off.  Urine output over 500 cc in the last 24 hours.  Labs this morning reviewed, no urgent need for renal replacement therapy.  Continue current medical support.  Follow daily labs and monitor ins and outs.  Keep MAP over 65 mmHg.    #2 elevated anion gap acidosis in the setting of ELIESER, resolved after CRRT    3 azotemia with concern for possible uremia, initially started on CRRT on 4/4, now off.    4 acute hypoxemic respiratory failure ventilator dependent status post trach    #5 encephalopathy, suspected multifactorial, SAH, history of seizure disorder, now embolic strokes, management as per critical care team, neurology on board    #6 stenotrophomonas pneumonia, Candida pneumonia, candidemia, management as per primary team    #7 hyperphosphatemia in the setting of ELIESER, phosphorus mildly elevated, follow daily labs, if continues to increase change tube feeds to Nepro    #8 anemia in the setting of GI bleeding, B-cell lymphoma, hemoglobin low but is stable, status post blood transfusion, last hemoglobin 7.6 this morning    #9 thyroid mass with 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  Off inotropic/pressor support, blood pressure is stable.  CRRT discontinued yesterday    OBJECTIVE:  Current Weight: Weight - Scale: 83.5 kg (184 lb)  Vitals:    04/08/24 0716   BP:    Pulse:    Resp:    Temp:    SpO2: 100%       Intake/Output Summary (Last 24  hours) at 4/8/2024 0805  Last data filed at 4/8/2024 0415  Gross per 24 hour   Intake 1991.85 ml   Output 970 ml   Net 1021.85 ml       General: Ill-appearing, unresponsive, in not acute distress  Eyes: conjunctivae pale, anicteric sclerae  ENT: lips and mucous membranes moist  Neck: supple, no JVD, trach to vent  Chest: Coarse breath sounds bilateral, no crackles, ronchus or wheezings  CVS: distinct S1 & S2, normal rate, regular rhythm  Abdomen: Obese, non-tender, non-distended, normoactive bowel sounds  Extremities: no significant edema of both legs  Skin: no rash  Neuro: Patient trach to vent, ROS unable to be obtained  Temporary HD line in place  Genitourinary urinary Ortiz catheter in place      Medications:    Current Facility-Administered Medications:     acetaminophen (TYLENOL) tablet 650 mg, 650 mg, Oral, Q6H PRN, Mahamed P Cardenas, DO, 650 mg at 04/07/24 1257    chlorhexidine (PERIDEX) 0.12 % oral rinse 15 mL, 15 mL, Mouth/Throat, Q12H SARTHAK, Mahamed P Cardenas, DO, 15 mL at 04/07/24 2205    enoxaparin (LOVENOX) subcutaneous injection 40 mg, 40 mg, Subcutaneous, Q24H SARTHAK, Britany Prenatt, DO, 40 mg at 04/07/24 0956    fentaNYL injection 25 mcg, 25 mcg, Intravenous, Q1H PRN, Mahamed P Cardenas, DO, 25 mcg at 04/06/24 2335    fluconazole (DIFLUCAN) (HIGH DOSE) IVPB 800 mg, 800 mg, Intravenous, Q24H, Mahamed P Cardenas, DO, Stopped at 04/07/24 2200    insulin regular (HumuLIN R,NovoLIN R) 1 Units/mL in sodium chloride 0.9 % 100 mL infusion, 0.3-21 Units/hr, Intravenous, Titrated, Ammar Alam, DO, Last Rate: 2 mL/hr at 04/08/24 0616, 2 Units/hr at 04/08/24 0616    lacosamide (VIMPAT) injection 100 mg, 100 mg, Intravenous, Q12H, Mahamed P Cardenas, DO, 100 mg at 04/07/24 2205    levalbuterol (XOPENEX) inhalation solution 1.25 mg, 1.25 mg, Nebulization, TID, Mahamed Cardenas DO, 1.25 mg at 04/08/24 0716    minocycline (DYNACIN) tablet 200 mg, 200 mg, Per NG Tube, Q12H, Mahamed Cardenas DO, 200 mg at 04/07/24 2206    modafinil (PROVIGIL)  tablet 100 mg, 100 mg, Oral, Daily, Zarian Prenatt, DO, 100 mg at 04/07/24 0956    nystatin (MYCOSTATIN) powder, , Topical, BID, Mahamed P Cardenas, DO, Given at 04/07/24 1826    ondansetron (ZOFRAN) injection 4 mg, 4 mg, Intravenous, Q6H PRN, Mahamed P Cardenas, DO    pantoprazole (PROTONIX) injection 40 mg, 40 mg, Intravenous, Q12H SARTHAK, Mahamed P Cardenas, DO, 40 mg at 04/07/24 2214    [COMPLETED] piperacillin-tazobactam (ZOSYN) 4.5 g in sodium chloride 0.9 % 100 mL IV LOADING DOSE, 4.5 g, Intravenous, Once, 4.5 g at 04/03/24 1350 **FOLLOWED BY** piperacillin-tazobactam (ZOSYN) 4.5 g in sodium chloride 0.9 % 100 mL IVPB (EXTENDED INFUSION), 4.5 g, Intravenous, Q8H, Mahamed P Cardenas, DO, Last Rate: 25 mL/hr at 04/08/24 0300, 4.5 g at 04/08/24 0300    polyethylene glycol (MIRALAX) packet 17 g, 17 g, Per NG Tube, Daily, Mahamed P Cardenas, DO, 17 g at 04/07/24 0956    sodium chloride (AYR SALINE NASAL) nasal gel 1 Application, 1 Application, Nasal, Q1H PRN, Mahamed P Cardenas, DO, 1 Application at 03/29/24 2212    sodium chloride (OCEAN) 0.65 % nasal spray 2 spray, 2 spray, Each Nare, BID, Mahamed P Cardenas, DO, 2 spray at 04/07/24 2205    sodium chloride 3 % inhalation solution 4 mL, 4 mL, Nebulization, TID, Mahamed P Cardenas, DO, 4 mL at 04/08/24 0716    sulfamethoxazole-trimethoprim (BACTRIM DS) 800-160 mg per tablet 1 tablet, 1 tablet, Per NG Tube, Once per day on Monday Wednesday Friday, Mahamed P Cardenas, DO, 1 tablet at 04/05/24 0835    Invasive Devices:   Urethral Catheter Three way 18 Fr. (Active)   Reasons to continue Urinary Catheter  Acute urinary retention/obstruction failing urinary retention protocol 04/07/24 2200   Goal for Removal Voiding trial when ambulation improves 04/07/24 2200   Site Assessment Clean;Skin intact 04/07/24 2200   Ortiz Care Done 04/07/24 2100   Collection Container Standard drainage bag 04/07/24 2200   Securement Method Securing device (Describe) 04/07/24 2200   Irrigant Normal saline 04/02/24 1207   Urethral  "Irrigation Intake (mL) 60 mL 04/04/24 0606   Output (mL) 100 mL 04/08/24 0415       Lab Results:   Results from last 7 days   Lab Units 04/08/24  0505 04/07/24  1834 04/07/24  0508 04/06/24  0813 04/06/24  0503 04/05/24  1158 04/05/24  0522 04/04/24  1802 04/04/24  0752   WBC Thousand/uL 16.24* 16.35* 15.69*   < >  --    < > 13.25*   < > 12.73*   HEMOGLOBIN g/dL 7.6* 6.7* 7.8*   < >  --    < > 9.4*  9.4*   < > 7.9*   HEMATOCRIT % 23.5* 21.2* 23.9*   < >  --    < > 29.1*  29.0*   < > 23.4*   PLATELETS Thousands/uL 36* 48* 59*   < >  --    < > 57*   < > 68*   SODIUM mmol/L 141 137 138   < > 137   < > 141   < >  --    POTASSIUM mmol/L 4.4 4.4 4.9   < > 4.8   < > 4.6   < >  --    CHLORIDE mmol/L 107 106 105   < > 104   < > 108   < >  --    CO2 mmol/L 22 21 21   < > 20*   < > 21   < >  --    BUN mg/dL 55* 52* 46*   < > 40*   < > 76*   < >  --    CREATININE mg/dL 0.86 0.84 0.79   < > 0.62   < > 1.07   < >  --    CALCIUM mg/dL 10.2 10.0 10.2   < > 10.0   < > 10.0   < >  --    MAGNESIUM mg/dL 2.0  --  1.9  --  2.0   < > 2.1   < >  --    PHOSPHORUS mg/dL 4.8*  --  4.6*  --  3.9   < > 5.1*   < >  --    ALK PHOS U/L  --   --  900*  --   --   --  346*  --  381*   ALT U/L  --   --  37  --   --   --  32  --  34   AST U/L  --   --  24  --   --   --  20  --  21    < > = values in this interval not displayed.       Previous work up:  See previous notes      Portions of the record may have been created with voice recognition software. Occasional wrong word or \"sound a like\" substitutions may have occurred due to the inherent limitations of voice recognition software. Read the chart carefully and recognize, using context, where substitutions have occurred.If you have any questions, please contact the dictating provider.  "

## 2024-04-08 NOTE — PLAN OF CARE
Problem: Prexisting or High Potential for Compromised Skin Integrity  Goal: Skin integrity is maintained or improved  Description: INTERVENTIONS:  - Identify patients at risk for skin breakdown  - Assess and monitor skin integrity  - Assess and monitor nutrition and hydration status  - Monitor labs   - Assess for incontinence   - Turn and reposition patient  - Assist with mobility/ambulation  - Relieve pressure over bony prominences  - Avoid friction and shearing  - Provide appropriate hygiene as needed including keeping skin clean and dry  - Evaluate need for skin moisturizer/barrier cream  - Collaborate with interdisciplinary team   - Patient/family teaching  - Consider wound care consult   Outcome: Progressing     Problem: INFECTION - ADULT  Goal: Absence or prevention of progression during hospitalization  Description: INTERVENTIONS:  - Assess and monitor for signs and symptoms of infection  - Monitor lab/diagnostic results  - Monitor all insertion sites, i.e. indwelling lines, tubes, and drains  - Monitor endotracheal if appropriate and nasal secretions for changes in amount and color  - Superior appropriate cooling/warming therapies per order  - Administer medications as ordered  - Instruct and encourage patient and family to use good hand hygiene technique  - Identify and instruct in appropriate isolation precautions for identified infection/condition  Outcome: Progressing     Problem: SAFETY ADULT  Goal: Patient will remain free of falls  Description: INTERVENTIONS:  - Educate patient/family on patient safety including physical limitations  - Instruct patient to call for assistance with activity   - Consult OT/PT to assist with strengthening/mobility   - Keep Call bell within reach  - Keep bed low and locked with side rails adjusted as appropriate  - Keep care items and personal belongings within reach  - Initiate and maintain comfort rounds  - Make Fall Risk Sign visible to staff  - Offer Toileting every  2 Hours, in advance of need  - Initiate/Maintain bed alarm  - Obtain necessary fall risk management equipment: non skid footwear  - Apply yellow socks and bracelet for high fall risk patients  - Consider moving patient to room near nurses station  Outcome: Progressing     Problem: RESPIRATORY - ADULT  Goal: Achieves optimal ventilation and oxygenation  Description: INTERVENTIONS:  - Assess for changes in respiratory status  - Assess for changes in mentation and behavior  - Position to facilitate oxygenation and minimize respiratory effort  - Oxygen administered by appropriate delivery if ordered  - Initiate smoking cessation education as indicated  - Encourage broncho-pulmonary hygiene including cough, deep breathe, Incentive Spirometry  - Assess the need for suctioning and aspirate as needed  - Assess and instruct to report SOB or any respiratory difficulty  - Respiratory Therapy support as indicated  Outcome: Progressing     Problem: SKIN/TISSUE INTEGRITY - ADULT  Goal: Skin Integrity remains intact(Skin Breakdown Prevention)  Description: Assess:  -Perform Flavio assessment every shift   -Clean and moisturize skin every day   -Inspect skin when repositioning, toileting, and assisting with ADLS  -Assess under medical devices such as ronny  every hour   -Assess extremities for adequate circulation and sensation     Bed Management:  -Have minimal linens on bed & keep smooth, unwrinkled  -Change linens as needed when moist or perspiring  -Avoid sitting or lying in one position for more than 2 hours while in bed  -Keep HOB at 45 degrees     Toileting:  -Offer bedside commode  -Assess for incontinence every hour  -Use incontinent care products after each incontinent episode such as moisture barrier cream     Activity:  -Mobilize patient 3 times a day  -Encourage activity and walks on unit  -Encourage or provide ROM exercises   -Turn and reposition patient every 2 Hours  -Use appropriate equipment to lift or move  patient in bed  -Instruct/ Assist with weight shifting every hour when out of bed in chair  -Consider limitation of chair time 2 hour intervals    Skin Care:  -Avoid use of baby powder, tape, friction and shearing, hot water or constrictive clothing  -Relieve pressure over bony prominences using allevyn   -Do not massage red bony areas    Next Steps:  -Teach patient strategies to minimize risks such as weight shifting    -Consider consults to  interdisciplinary teams such as PT  Outcome: Progressing  Goal: Incision(s), wounds(s) or drain site(s) healing without S/S of infection  Description: INTERVENTIONS  - Assess and document dressing, incision, wound bed, drain sites and surrounding tissue  - Provide patient and family education  - Perform skin care/dressing changes every   Outcome: Progressing  Goal: Pressure injury heals and does not worsen  Description: Interventions:  - Implement low air loss mattress or specialty surface (Criteria met)  - Apply silicone foam dressing  - Instruct/assist with weight shifting every  minutes when in chair   - Limit chair time to  hour intervals  - Use special pressure reducing interventions such as  when in chair   - Apply fecal or urinary incontinence containment device   - Perform passive or active ROM every   - Turn and reposition patient & offload bony prominences every  hours   - Utilize friction reducing device or surface for transfers   - Consider consults to  interdisciplinary teams such as   - Use incontinent care products after each incontinent episode such as   - Consider nutrition services referral as needed  Outcome: Progressing     Problem: Potential for Falls  Goal: Patient will remain free of falls  Description: INTERVENTIONS:  - Educate patient/family on patient safety including physical limitations  - Instruct patient to call for assistance with activity   - Consult OT/PT to assist with strengthening/mobility   - Keep Call bell within reach  - Keep bed low and  locked with side rails adjusted as appropriate  - Keep care items and personal belongings within reach  - Initiate and maintain comfort rounds  - Make Fall Risk Sign visible to staff  - Offer Toileting every 2 Hours, in advance of need  - Initiate/Maintain bed alarm  - Obtain necessary fall risk management equipment: non skid footwear  - Apply yellow socks and bracelet for high fall risk patients  - Consider moving patient to room near nurses station  Outcome: Progressing     Problem: Nutrition/Hydration-ADULT  Goal: Nutrient/Hydration intake appropriate for improving, restoring or maintaining nutritional needs  Description: Monitor and assess patient's nutrition/hydration status for malnutrition. Collaborate with interdisciplinary team and initiate plan and interventions as ordered.  Monitor patient's weight and dietary intake as ordered or per policy. Utilize nutrition screening tool and intervene as necessary. Determine patient's food preferences and provide high-protein, high-caloric foods as appropriate.     INTERVENTIONS:  - Monitor oral intake, urinary output, labs, and treatment plans  - Assess nutrition and hydration status and recommend course of action  - Evaluate amount of meals eaten  - Assist patient with eating if necessary   - Allow adequate time for meals  - Recommend/ encourage appropriate diets, oral nutritional supplements, and vitamin/mineral supplements  - Order, calculate, and assess calorie counts as needed  - Recommend, monitor, and adjust tube feedings and TPN/PPN based on assessed needs  - Assess need for intravenous fluids  - Provide specific nutrition/hydration education as appropriate  - Include patient/family/caregiver in decisions related to nutrition  Outcome: Progressing     Problem: COPING  Goal: Pt/Family able to verbalize concerns and demonstrate effective coping strategies  Description: INTERVENTIONS:  - Assist patient/family to identify coping skills, available support systems  and cultural and spiritual values  - Provide emotional support, including active listening and acknowledgement of concerns of patient and caregivers  - Reduce environmental stimuli, as able  - Provide patient education  - Assess for spiritual pain/suffering and initiate spiritual care, including notification of Pastoral Care or natalia based community as needed  - Assess effectiveness of coping strategies  Outcome: Progressing  Goal: Will report anxiety at manageable levels  Description: INTERVENTIONS:  - Administer medication as ordered  - Teach and encourage coping skills  - Provide emotional support  - Assess patient/family for anxiety and ability to cope  Outcome: Progressing     Problem: BEHAVIOR  Goal: Pt/Family maintain appropriate behavior and adhere to behavioral management agreement, if implemented  Description: INTERVENTIONS:  - Assess the family dynamic   - Encourage verbalization of thoughts and concerns in a socially appropriate manner  - Assess patient/family's coping skills and non-compliant behavior (including use of illegal substances).  - Utilize positive, consistent limit setting strategies supporting safety of patient, staff and others  - Initiate consult with Case Management, Spiritual Care or other ancillary services as appropriate  - If a patient's/visitor's behavior jeopardizes the safety of the patient, staff, or others, refer to organization procedure.   - Notify Security of behavior or suspected illegal substances which indicate the need for search of the patient and/or belongings  - Encourage participation in the decision making process about a behavioral management agreement; implement if patient meets criteria  Outcome: Progressing     Problem: NEUROSENSORY - ADULT  Goal: Achieves stable or improved neurological status  Description: INTERVENTIONS  - Monitor and report changes in neurological status  - Monitor vital signs such as temperature, blood pressure, glucose, and any other labs  ordered   - Initiate measures to prevent increased intracranial pressure  - Monitor for seizure activity and implement precautions if appropriate      Outcome: Progressing  Goal: Achieves maximal functionality and self care  Description: INTERVENTIONS  - Monitor swallowing and airway patency with patient fatigue and changes in neurological status  - Encourage and assist patient to increase activity and self care.   - Encourage visually impaired, hearing impaired and aphasic patients to use assistive/communication devices  Outcome: Progressing     Problem: CARDIOVASCULAR - ADULT  Goal: Maintains optimal cardiac output and hemodynamic stability  Description: INTERVENTIONS:  - Monitor I/O, vital signs and rhythm  - Monitor for S/S and trends of decreased cardiac output  - Administer and titrate ordered vasoactive medications to optimize hemodynamic stability  - Assess quality of pulses, skin color and temperature  - Assess for signs of decreased coronary artery perfusion  - Instruct patient to report change in severity of symptoms  Outcome: Progressing  Goal: Absence of cardiac dysrhythmias or at baseline rhythm  Description: INTERVENTIONS:  - Continuous cardiac monitoring, vital signs, obtain 12 lead EKG if ordered  - Administer antiarrhythmic and heart rate control medications as ordered  - Monitor electrolytes and administer replacement therapy as ordered  Outcome: Progressing     Problem: GASTROINTESTINAL - ADULT  Goal: Minimal or absence of nausea and/or vomiting  Description: INTERVENTIONS:  - Administer IV fluids if ordered to ensure adequate hydration  - Maintain NPO status until nausea and vomiting are resolved  - Nasogastric tube if ordered  - Administer ordered antiemetic medications as needed  - Provide nonpharmacologic comfort measures as appropriate  - Advance diet as tolerated, if ordered  - Consider nutrition services referral to assist patient with adequate nutrition and appropriate food  choices  Outcome: Progressing  Goal: Maintains or returns to baseline bowel function  Description: INTERVENTIONS:  - Assess bowel function  - Encourage oral fluids to ensure adequate hydration  - Administer IV fluids if ordered to ensure adequate hydration  - Administer ordered medications as needed  - Encourage mobilization and activity  - Consider nutritional services referral to assist patient with adequate nutrition and appropriate food choices  Outcome: Progressing  Goal: Maintains adequate nutritional intake  Description: INTERVENTIONS:  - Monitor percentage of each meal consumed  - Identify factors contributing to decreased intake, treat as appropriate  - Assist with meals as needed  - Monitor I&O, weight, and lab values if indicated  - Obtain nutrition services referral as needed  Outcome: Progressing  Goal: Establish and maintain optimal ostomy function  Description: INTERVENTIONS:  - Assess bowel function  - Encourage oral fluids to ensure adequate hydration  - Administer IV fluids if ordered to ensure adequate hydration   - Administer ordered medications as needed  - Encourage mobilization and activity  - Nutrition services referral to assist patient with appropriate food choices  - Assess stoma site  - Consider wound care consult   Outcome: Progressing  Goal: Oral mucous membranes remain intact  Description: INTERVENTIONS  - Assess oral mucosa and hygiene practices  - Implement preventative oral hygiene regimen  - Implement oral medicated treatments as ordered  - Initiate Nutrition services referral as needed  Outcome: Progressing     Problem: GENITOURINARY - ADULT  Goal: Maintains or returns to baseline urinary function  Description: INTERVENTIONS:  - Assess urinary function  - Encourage oral fluids to ensure adequate hydration if ordered  - Administer IV fluids as ordered to ensure adequate hydration  - Administer ordered medications as needed  - Offer frequent toileting  - Follow urinary retention  protocol if ordered  Outcome: Progressing  Goal: Urinary catheter remains patent  Description: INTERVENTIONS:  - Assess patency of urinary catheter  - If patient has a chronic marie, consider changing catheter if non-functioning  - Follow guidelines for intermittent irrigation of non-functioning urinary catheter  Outcome: Progressing     Problem: METABOLIC, FLUID AND ELECTROLYTES - ADULT  Goal: Electrolytes maintained within normal limits  Description: INTERVENTIONS:  - Monitor labs and assess patient for signs and symptoms of electrolyte imbalances  - Administer electrolyte replacement as ordered  - Monitor response to electrolyte replacements, including repeat lab results as appropriate  - Instruct patient on fluid and nutrition as appropriate  Outcome: Progressing  Goal: Fluid balance maintained  Description: INTERVENTIONS:  - Monitor labs   - Monitor I/O and WT  - Instruct patient on fluid and nutrition as appropriate  - Assess for signs & symptoms of volume excess or deficit  Outcome: Progressing  Goal: Glucose maintained within target range  Description: INTERVENTIONS:  - Monitor Blood Glucose as ordered  - Assess for signs and symptoms of hyperglycemia and hypoglycemia  - Administer ordered medications to maintain glucose within target range  - Assess nutritional intake and initiate nutrition service referral as needed  Outcome: Progressing     Problem: HEMATOLOGIC - ADULT  Goal: Maintains hematologic stability  Description: INTERVENTIONS  - Assess for signs and symptoms of bleeding or hemorrhage  - Monitor labs  - Administer supportive blood products/factors as ordered and appropriate  Outcome: Progressing     Problem: MUSCULOSKELETAL - ADULT  Goal: Maintain or return mobility to safest level of function  Description: INTERVENTIONS:  - Assess patient's ability to carry out ADLs; assess patient's baseline for ADL function and identify physical deficits which impact ability to perform ADLs (bathing, care of  mouth/teeth, toileting, grooming, dressing, etc.)  - Assess/evaluate cause of self-care deficits   - Assess range of motion  - Assess patient's mobility  - Assess patient's need for assistive devices and provide as appropriate  - Encourage maximum independence but intervene and supervise when necessary  - Involve family in performance of ADLs  - Assess for home care needs following discharge   - Consider OT consult to assist with ADL evaluation and planning for discharge  - Provide patient education as appropriate  Outcome: Progressing     Problem: MOBILITY - ADULT  Goal: Maintain or return to baseline ADL function  Description: INTERVENTIONS:  -  Assess patient's ability to carry out ADLs; assess patient's baseline for ADL function and identify physical deficits which impact ability to perform ADLs (bathing, care of mouth/teeth, toileting, grooming, dressing, etc.)  - Assess/evaluate cause of self-care deficits   - Assess range of motion  - Assess patient's mobility; develop plan if impaired  - Assess patient's need for assistive devices and provide as appropriate  - Encourage maximum independence but intervene and supervise when necessary  - Involve family in performance of ADLs  - Assess for home care needs following discharge   - Consider OT consult to assist with ADL evaluation and planning for discharge  - Provide patient education as appropriate  Outcome: Progressing  Goal: Maintains/Returns to pre admission functional level  Description: INTERVENTIONS:  - Perform AM-PAC 6 Click Basic Mobility/ Daily Activity assessment daily.  - Set and communicate daily mobility goal to care team and patient/family/caregiver.   - Collaborate with rehabilitation services on mobility goals if consulted  - Perform Range of Motion  times a day.  - Reposition patient every  hours.  - Dangle patient  times a day  - Stand patient  times a day  - Ambulate patient  times a day  - Out of bed to chair  times a day   - Out of bed for  meals  times a day  - Out of bed for toileting  - Record patient progress and toleration of activity level   Outcome: Progressing

## 2024-04-08 NOTE — PROGRESS NOTES
NEUROLOGY RESIDENCY PROGRESS NOTE     Name: Carla Hunt   Age & Sex: 59 y.o. female   MRN: [id number]  [facility]   Encounter: [id number]    Recommendations for outpatient neurological follow up have yet to be determined.     Pending for discharge: Clinical Improvement    ASSESSMENT & PLAN     Cerebrovascular accident (CVA) due to embolism (HCC)  Assessment & Plan  Assessment:  MRI 4/6/2024 noted multiple small bilateral foci of acute infarcts. No significant edema, mass effect, or hemorrhagic transformation.  Neurology was consulted due to this finding.    Lab Results   Component Value Date    HGBA1C 6.6 (H) 01/09/2024     Lab Results   Component Value Date    CHOLESTEROL 118 01/09/2024    TRIG 331 (H) 03/13/2024    HDL 40 (L) 01/09/2024    LDLCALC 59 01/09/2024     Impression: Strokes are embolic in nature.  Atrial fibrillation as a cause can be ruled out as the patient has been on telemetry monitoring for practically her whole hospitalization.  The patient's cause of emboli are currently unknown, given the infectious processes the patient currently has septic emboli are high on the differential however would not rule out these being caused by a PFO and a VTE event.  The patient's altered mental status is not due to her strokes as the stroke burden is very minimal.    Plan:  - Recommended SATURNINO, patient possibly getting it on 4/9/24  - At this time would not recommend initiation of aspirin given the strong suspicion for septic emboli and the risks that these would cause bleeding.  - Continue Neuro checks  - Maintain glucose <180, SSI for coverage if indicated  - Repeat CTH if GCS drops 2pts in 1hr  - Atorvastatin 40 mg q.d.  - Continue monitoring on telemetry  - Rest of care per primary team    Encephalopathy  Assessment & Plan  59 y.o. 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 Patient found to have COVID infection and ELIESER in ED. CTH with bifrontal hyperdensities concerning for hemorrhage vs calcifications.  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    1/8: Neurology consulted with concern for focal seizures in setting of Covid infection and worsening renal function.   1/9: loaded with Vimpat 400 mg IV x 1 and Topiramate increased to 150 mg twice daily.   1/10: Continued to be encephalopathic with periods of agitation/combativeness. Afebrile since 1/9 afternoon. No overt seizure-like activity noted by family or staff. On high-flow oxygen, remdesivir and steroids for underlying COVID. Renal function improved. MRI brain w/wo negative for stroke. Patient transferred to \[facility]\"" neuro ICU for ongoing management/video EEG monitoring (see below).    3/13: Neurology was reconsulted due to patient not following commands or moving any extremity.  Repeat CT head unremarkable.  High suspicion for TME given prolonged hospitalization, infection, and cefepime use.  3/27: Neurology was reconsulted due to worsening pancytopenia and concern that this was related to Lamictal.  Will discontinue Lamictal and start Vimpat. (Previously on Vimpat at the beginning of her hospitalization due to being NPO and not being able to take her oral home AEDs.  No reported side effects).    Neurodiagnostics:   CTA H/N wwo contrast 1/7/24:   \"No acute arterial vascular abnormality in the head or neck. No " "flow-limiting large vessel arterial vascular stenosis in the head or neck. Tiny punctate foci of relative increased parenchymal density in the frontal lobes bilaterally, unchanged from 2 hours earlier and probably representing low-density parenchymal calcifications rather than parenchymal hemorrhage. Conservative management with an additional follow-up noncontrast head CT is recommended in 24 to 48 hours. Reidentified left temporal lobe resection. Dense basal ganglia and cerebellar calcifications again noted. Groundglass and consolidative airspace opacities most suspicious for extensive pneumonia seen throughout the visualized mid and upper lung zones more dense on the right than on the left.\"  MRI brain wo 1/9/24:  Tiny focus of abnormal diffusion signal within the right basal ganglia involving the anterior limb of the internal capsule not clearly restricted on the ADC maps but suspicious for small acute lacunar infarct.\"   MRI brain seizure wo and w contrast 1/10/24:  \"Previously visualized diffusion signal abnormality in the right basal ganglia is no longer identified and may have been artifactual given underlying susceptibility artifact in the bilateral globus pallidus.\"  Repeat CTH 3/6 and 3/9 with no acute changes.  VEEG (1/11 - 1/13; 57 hours):   Disorganized theta delta activity suggestive of moderate diffuse cerebral dysfunction.  The episode of groaning and nonspecific movements of extremities is nonepileptic in etiology.  No electrographic seizures.  LP 1/12: attempted with low flow CSF, patient very restless. Initially clear fluid then blood tinged. Procedure aborted. Limited CSF results: M/E panel wnl, WBC 1, protein 50, Gram stain with no polys or bacteria  LP 1/15: WBC 1, protein 50, RBC 25, glucose 113, ME negative, HSV PCR negative, Gram stain/culture negative    Relevant home meds:  Lamictal 200 mg BID  Topiramate 100 mg BID    Hospital AEDs:  Vimpat 200 mg BID started 1/8 due to being NPO and " unable to take home AEDs; 400mg IV x1 . Decreased to 150 mg BID  secondary to elevated level.  Now on 100 mg daily  Topiramate and Lamictal previously    Plan:  - Continue maintenance Vimpat 100 mg Q12 hours  - Continue provigil 100 mg daily  - Delirium precautions   - Continue to monitor and notify neurology with any changes.   - Medical management and supportive care per primary team. Correction of any metabolic or infectious disturbances        SUBJECTIVE     Patient is a 59 year old female who presented to the ED initially for abdominal pain and found to have severe COVID encephalopathy. Today, the patient is still non-verbal. Patient was seen and examined. No acute events overnight.       Review of Systems   Unable to perform ROS: Patient nonverbal       OBJECTIVE     Patient ID: Carla Hunt is a 59 y.o. female.    Vitals:    24 0400 24 0500 24 0534 24 0747   BP:  133/78     BP Location:       Pulse: (!) 119 (!) 118  (!) 116   Resp: (!) 29 (!) 27  (!) 27   Temp: 98.1 °F (36.7 °C)   98.1 °F (36.7 °C)   TempSrc: Axillary   Oral   SpO2: 97% 98%  97%   Weight:   84.7 kg (186 lb 11.7 oz)    Height:          Temperature:   Temp (24hrs), Av.5 °F (36.9 °C), Min:97.9 °F (36.6 °C), Max:99.9 °F (37.7 °C)    Temperature: 98.1 °F (36.7 °C)      Physical Exam  Vitals reviewed.   HENT:      Head: Normocephalic and atraumatic.   Eyes:      Extraocular Movements: Extraocular movements intact.      Conjunctiva/sclera: Conjunctivae normal.      Pupils: Pupils are equal, round, and reactive to light.   Cardiovascular:      Rate and Rhythm: Tachycardia present.          Neurologic Exam     Mental Status   Level of consciousness: responsive to painful stimuli  Patient opens eyes to sternal rub but not following any other commands     Cranial Nerves     CN III, IV, VI   Pupils are equal, round, and reactive to light.    CN V   Right corneal reflex: normal  Left corneal reflex: normal    CN  VII   Facial expression full, symmetric.   Blink to threat intact b/l, corneal reflexes intact, no facial asymmetry appreciated, cough and gag intact      Motor Exam   No movement with painful stimulation     Sensory Exam   No movement with painful stimulation     Gait, Coordination, and Reflexes     Reflexes   Right Velasco: absent  Left Velasco: absent  Right ankle clonus: absent  Left ankle clonus: absent  Diminished reflexes throughout       LABORATORY DATA     Labs: I have personally reviewed pertinent reports.    Results from last 7 days   Lab Units 04/09/24 0446 04/09/24  0157 04/08/24  1759   WBC Thousand/uL 19.91* 19.32* 18.52*   HEMOGLOBIN g/dL 8.9* 8.8* 5.7*   HEMATOCRIT % 26.8* 26.9* 18.0*   PLATELETS Thousands/uL 54* 58* 32*   MONO PCT % 0* 1* 1*   EOS PCT % 0 0 0      Results from last 7 days   Lab Units 04/09/24 0446 04/08/24  0505 04/07/24  1834 04/07/24  0508 04/05/24  1158 04/05/24  0522 04/04/24  1802 04/04/24  0752   SODIUM mmol/L 139 141 137 138   < > 141   < >  --    POTASSIUM mmol/L 4.4 4.4 4.4 4.9   < > 4.6   < >  --    CHLORIDE mmol/L 106 107 106 105   < > 108   < >  --    CO2 mmol/L 19* 22 21 21   < > 21   < >  --    BUN mg/dL 60* 55* 52* 46*   < > 76*   < >  --    CREATININE mg/dL 1.02 0.86 0.84 0.79   < > 1.07   < >  --    CALCIUM mg/dL 10.2 10.2 10.0 10.2   < > 10.0   < >  --    ALK PHOS U/L  --   --   --  900*  --  346*  --  381*   ALT U/L  --   --   --  37  --  32  --  34   AST U/L  --   --   --  24  --  20  --  21    < > = values in this interval not displayed.     Results from last 7 days   Lab Units 04/09/24 0446 04/08/24  0505 04/07/24  0508   MAGNESIUM mg/dL 1.9 2.0 1.9     Results from last 7 days   Lab Units 04/09/24 0446 04/08/24  0505 04/07/24  0508   PHOSPHORUS mg/dL 5.4* 4.8* 4.6*      Results from last 7 days   Lab Units 04/09/24  0446 04/08/24  0505 04/07/24  0508 04/04/24  0425 04/04/24  0118   INR  1.40* 1.34* 1.40*   < > 1.92*   PTT seconds  --   --   --   --  40*     < > = values in this interval not displayed.     Results from last 7 days   Lab Units 04/04/24  2312   LACTIC ACID mmol/L 0.7           IMAGING & DIAGNOSTIC TESTING     Radiology Results: I have personally reviewed pertinent reports.   and I have personally reviewed pertinent films in PACS     VAS VENOUS DUPLEX - LOWER LIMB BILATERAL   Final Result by Gus King MD (04/07 1952)      MRI brain wo contrast   Final Result by Jason Cabrera MD (04/06 1605)      1.  Multiple small bilateral foci of acute infarcts as described suggesting embolic etiology. No significant edema, mass effect, or hemorrhagic transformation.   2.  Partial lack of sulcal CSF suppression in the FLAIR sequence noted in the supratentorial and infratentorial brain. This could be artifactual/technique related; however, inflammatory or infectious process is not excluded. Correlate with clinical    assessment.   3.  Stable postoperative appearance from prior partial left temporal lobe resection.   4.  Complete opacification of bilateral mastoids air cells and middle ears.         The study was marked in EPIC for immediate notification.      Workstation performed: [id number]         US thyroid   Final Result by Dwight Austin MD (04/05 1231)      1. Limited exam due to overlying tracheostomy tube and central line catheter. Heterogeneous lobular appearance to the remaining thyroid gland, nonspecific, question sequela of thyroiditis. No discrete nodule identified.            Reference: ACR Thyroid Imaging, Reporting and Data System (TI-RADS): White Paper of the ACR TI-RADS Committee. J AM Maria G Radiol 2017;14:587-595. Additional recommendations based on American Thyroid Association 2015 guidelines.         Workstation performed: [id number]         CT chest abdomen pelvis w contrast   Final Result by Galdino Blankenship MD (04/05 1105)      Improved but persistent groundglass and consolidative bilateral pulmonary opacities.       No new intra-abdominal or intrapelvic findings.         Workstation performed: [id number]         CT head wo contrast   Final Result by Jona Morton MD (04/05 1053)      No acute intracranial abnormality status post left partial temporal lobectomy given limitations of beam-hardening streak artifact in posterior fossa.      Similar mild chronic microangiopathy.      Large bilateral mastoid and bilateral middle ear effusions, worsened on right side.                  Workstation performed: [id number]         XR chest portable ICU   Final Result by Jalen James MD (04/04 1615)         1. Interval placement of right IJ catheter tip overlying the lower SVC. Other tubes/lines stable.      2. Similar appearance of moderate diffuse bilateral infectious/inflammatory airspace opacities.            Workstation performed: [id number]         VAS upper limb venous duplex scan, unilateral/limited   Final Result by Cooper Nielsen MD (04/04 1126)      XR chest portable ICU   Final Result by Cooper Cai MD (04/04 0848)      1. Stable appearance of right lung infiltrate, now with developing hazy airspace opacities in the left lung   2. Nasogastric tube tip in the gastric fundus and sideport at the esophagogastric junction. May consider advancement to assure sideport is below the EG junction.      The examination demonstrates a significant  finding and was documented as such in [facility] for liaison and referring practitioner notification.                  Workstation performed: [id number]         CT abdomen pelvis w contrast   Final Result by Anna Maldonado MD (04/01 1428)      Stable postsurgical changes status post ileocolectomy with right lower quadrant ileostomy with persistent associated inflammatory changes and subcutaneous emphysema. No focal drainable fluid collection identified. Trace ascites.      Persistent bilateral lower lobe atelectasis.         Workstation performed: [id number]         XR chest  portable   Final Result by Cooper Cai MD (04/01 1328)      Focal consolidation in the right middle lung zone is approximately the same when compared to 3/31/2024, concerning for pneumonia.      Improvement in background groundglass consolidations in the left lung when compared to 3/31/2024.                     Resident: LOUISE LEUNG I, the attending radiologist, have reviewed the images and agree with the final report above.      Workstation performed: [id number]         CT head wo contrast   Final Result by Nawaf Mcneal MD (03/31 1011)         1. No acute intracranial hemorrhage, mass effect or edema.   2. 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   3. 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 Differential diagnosis could include treatment related changes versus other etiologies..                  Workstation performed: [id number]         CT chest w contrast   Final Result by Ravi Rivera MD (04/01 1754)   Addendum (preliminary) 1 of 1 by Ravi Rivera MD (04/01 1754)   ADDENDUM:      Please note the findings described in the body of an enlarged    heterogeneous right thyroid lobe is new from recent CT chest performed 3    weeks ago.   Given the new tracheostomy this most likely is procedure related possibly    hemorrhage.   Ultrasound characterization may be obtained in a few weeks.      I personally discussed this study with Moises Bowden on 4/1/2024 5:54 PM.      .      Final      Bilateral parenchymal infiltrative changes with consolidation like focus in the right upper lobe is new. Relative worsening.               Workstation performed: [id number]         XR chest portable ICU   Final Result by Kathi Monique MD (03/31 1421)      Persistent groundglass opacity with new consolidation in the right midlung. See chest CT for further evaluation.            Workstation  performed: [id number]         XR chest portable ICU   Final Result by Kathi Monique MD (03/30 0752)      Worsening bilateral groundglass opacity.            Workstation performed: [id number]         FL barium swallow video w speech   Final Result by VIRIDIANA VU (03/27 1426)      VAS upper limb venous duplex scan, unilateral/limited   Final Result by Alexandro Ortega DO (03/24 2156)      XR chest portable   Final Result by Ravi Rivera MD (03/22 1524)      Mild congestive changes.            Workstation performed: [id number]         XR chest portable ICU   Final Result by Jalen James MD (03/20 1647)      Overall mild improvement in multifocal bronchopneumonia, likely aspiration related.            Workstation performed: [id number]         XR chest portable ICU   Final Result by Nawaf Montoya MD (03/14 1335)      Worsening consolidation throughout the right lung and left lower lobe consistent with multi lobar pneumonia, possibly aspiration.      The study was marked in EPIC for immediate notification.      Workstation performed: [id number]         CT head wo contrast   Final Result by E. Alec Schoenberger, MD (03/13 1555)      No acute intracranial pathology. Stable postoperative changes status post left temporal lobectomy. Chronic microangiopathy.   Stable bilateral mastoid effusions, left greater than right and left middle ear effusion.. Stable sinus inflammatory changes.               Workstation performed: [id number]         XR chest portable   Final Result by Scot Edwards MD (03/13 1003)      Limited study. Tracheostomy appears satisfactory in position. Patchy pulmonary infiltrates throughout much of the right lung. Left basilar infiltrate. Otherwise, the left lung appears improved in aeration from prior exam. Recommend continued follow-up            Workstation performed: [id number]         XR chest portable ICU   Final Result by Lisa Carlson MD (03/12 1605)    Tubes and lines in satisfactory position.   Grossly unchanged diffuse parenchymal disease.            Workstation performed: [id number]         XR chest portable ICU   Final Result by Lisa Carlson MD (03/12 1605)   Tubes and lines in satisfactory position.   Grossly unchanged diffuse parenchymal disease.            Workstation performed: [id number]         XR chest portable   Final Result by Kathi Monique MD (03/11 1136)      Persistent extensive bilateral groundglass opacity.            Workstation performed: [id number]         CT chest abdomen pelvis wo contrast   Final Result by Julio C Alegre MD (03/10 1427)   1. Persistent bilateral groundglass and nodular consolidation with peripheral opacification at the right greater than left lung bases. Findings remain concerning for multi lobar infiltrates with possible superimposed COVID-19 opacities.   2. Status post ileocolectomy with a right lower quadrant ileostomy again exhibiting a patent stoma. Persistent inflammatory change and subcutaneous emphysema after recent intervention. No drainable collection.   3. Bilateral renal calcifications again suggestive of medullary sponge kidney with persistent mild right renal fullness but no evidence of distal obstructive pathology.                     Workstation performed: [id number]         XR abdomen 1 view kub   Final Result by Eduardo Monk MD (03/11 0845)      Nonobstructive bowel gas pattern.               Workstation performed: [id number]         XR chest portable   Final Result by Kathi Monique MD (03/10 0993)      Persistent extensive bilateral groundglass opacity.            Workstation performed: [id number]         CT head wo contrast   Final Result by Lavon Calle MD (03/09 3410)      -No acute intracranial abnormality. Stable microangiopathic changes.   -Stable postoperative changes related to left temporal lobectomy.   -Redemonstrated air-fluid levels within bilateral sphenoid sinuses, left  greater than right mastoid effusions and left middle ear effusion. Clinical correlation advised.         I personally communicated the findings via telephone with Ravi Garcia at 3:06 p.m. on 3/9/2024.                  Workstation performed: [id number]         XR chest portable   Final Result by Harry Peraza MD (03/09 1219)      No significant interval change in multifocal patchy groundglass opacities, left side greater than right. Findings are likely related to patient's COVID-19 pneumonia.            Resident: SANDIP Nieto I, the attending radiologist, have reviewed the images and agree with the final report above.      Workstation performed: [id number]         XR chest portable ICU   Final Result by Clinton Epperson MD (03/07 1249)      Diffuse patchy opacification in the right lung, either pneumonia or asymmetric pulmonary edema, improved in appearance since 3/5/2024.      Persistent atelectasis and/or consolidation in the base of the left lower lobe.            Workstation performed: [id number]         CT head wo contrast   Final Result by Jaleel Persaud MD (03/06 1338)      No acute intracranial abnormality.      Stable postoperative changes related to left temporal lobectomy.      Mild chronic microangiopathic ischemic changes.      New air-fluid levels within the left maxillary and bilateral sphenoid sinuses suggestive of acute on chronic sinusitis. New bilateral mastoid fluid.                        Workstation performed: [id number]         CT chest abdomen pelvis wo contrast   Final Result by Dannie Alves MD (03/06 1825)      1. Increased extent of groundglass opacities and patchy consolidations throughout the lungs. Focal consolidations in the posterior lung bases and right middle lobe are not significantly changed compared to March 4, 2024. Again, findings are likely due to    a combination of COVID-19 and bacterial pneumonia.      2. Redemonstrated edema and subcutaneous  "gas within the anterior abdominal wall fat surrounding the right lower quadrant ostomy site, amount of gas slightly decreased from prior.      4. Mild right pelvocaliectasis and ureterectasis. No obstructing calculi.      5. Fusiform ectasia of the ascending thoracic aorta measuring 40 mm. Recommend follow-up low radiation dose chest CT in 1 year.         The study was marked in EPIC for immediate notification.         Resident: LOUISE LEUNG I, the attending radiologist, have reviewed the images and agree with the final report above.      Workstation performed: [id number]         XR chest portable ICU   Final Result by Dipika Boston MD (03/05 1312)      Enteric tube placement as above. Bilateral patchy opacities, right greater than left.            Workstation performed: [id number]         XR chest portable ICU   Final Result by Kathi Monique MD (03/05 0949)      Persistent extensive bilateral groundglass opacity.            Workstation performed: [id number]         CT chest abdomen pelvis w contrast   Final Result by Edward Goodman MD (03/04 1343)      Evolving changes in the lungs which represent some combination of evolving pneumonitis secondary to viral COVID infection, improving atelectasis/bacterial infection in the posterior lower lung zones, and developing focal bacterial type pneumonia in the    lateral aspect of the right middle lobe.      Cellulitic edema and subcutaneous gas in the fatty soft tissue surrounding the ostomy site in the right mid abdomen. This probably represents infectious cellulitis and air tracking backward from the ostomy site. No drainable abscess seen.      Small volume of complex ascites again noted in the dependent hemipelvis.      This examination was marked \"immediate notification\" in Epic in order to begin the standard process by which the radiology reading room liaison alerts the referring practitioner.                  Workstation performed: [id number] "         XR chest portable ICU   Final Result by Scot Edwards MD (03/04 1155)      Line and tube appear satisfactory. Patchy bilateral pulmonary infiltrates which may be pneumonia or pulmonary edema. Possible small left pleural effusion            Workstation performed: [id number]         XR shoulder 2+ vw left   Final Result by Eduardo Monk MD (02/29 1017)      No acute osseous abnormality.         Workstation performed: [id number]         XR chest portable ICU   Final Result by Scot Edwards MD (02/28 1059)      Lines and tubes appear satisfactory.      Bilateral left greater than right infiltrates which could be asymmetric pulmonary edema or pneumonia. Correlate with clinical scenario            Workstation performed: [id number]         VAS upper limb venous duplex scan, complete, bilateral   Final Result by Shahriar Sparks MD (02/26 2301)      CT pe study w abdomen pelvis w contrast   Final Result by Miquel Raygoza MD (02/26 0247)      1.  No evidence of pulmonary embolism.   2.  Dense patchy opacities in the bilateral lower lobes and groundglass opacities within the upper lung fields similar to prior examination likely due to pneumonia.   3.  Mild thickening of the remaining colon which may be due to under distention versus nonspecific colitis.   4.  Small amount of ascites similar to prior examination.         Workstation performed: [id number]         XR chest portable ICU   Final Result by Rissa Tariq MD (02/26 1138)      Similar appearance of diffuse interstitial and hazy/groundglass opacity.      Esophageal temperature probe tip terminates over the middle third of the esophagus, consider advancement to the distal third of the esophagus.         Workstation performed: [id number]         XR abdomen 1 view kub   Final Result by Rissa Tariq MD (02/26 1140)      No acute findings.               Workstation performed: [id number]         XR chest portable ICU   Final Result  by Kathi Monique MD (02/25 1523)      Persistent extensive bilateral consolidation and groundglass opacity.            Workstation performed: [id number]         CTA chest (pe study) abdomen pelvis contrast   Final Result by Camila Patel MD (02/24 1317)      No pulmonary embolism      Mild improvement in the previously noted areas of diffuse lung opacification with the clearing in the upper lobe with residual areas of groundglass density and consolidations      Postoperative changes from the resection of the cecum/within the loculated fluid collection in the right paracolic gutter and pelvic cul-de-sac without any abscess with enhancing margins or air-fluid level         Moderate to marked distention of the stomach is again noted though decreased from the previous study of February 20, 2024. The feeding tube remains in place         Incidentally noted is aneurysmal dilatation of the ascending artery measures about 4.1 cm, surveillance suggested      The study was marked in EPIC for significant notification.               Workstation performed: [id number]         RADIOLOGY RESULTS   Final Result by  (02/24 0948)      XR chest portable ICU   Final Result by Nawaf Mcneal MD (02/23 1634)      Worsening pulmonary opacification possibly worsening multifocal pneumonia/pneumonitis versus worsening pulmonary edema.            Workstation performed: [id number]         XR chest portable ICU   Final Result by Ajit Rome DO (02/22 1526)      Similar to slight improved aeration of the lungs bilaterally with persistent bilateral multifocal opacities            Workstation performed: [id number]         XR chest portable   Final Result by Willard Roper MD (02/21 1223)   Persistent diffuse bilateral opacities consistent with multifocal pneumonia or pulmonary edema      Stable tubes and lines         Workstation performed: [id number]         XR chest portable ICU   Final Result by Lisa Cralson MD (02/21  1046)      No significant interval change.            Workstation performed: [id number]         CT chest abdomen pelvis wo contrast   Final Result by Nawaf Ch MD (02/20 1142)      1.  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   2.  Moderate bilateral hydroureteronephrosis presumably due to significantly distended urinary bladder.   3.  Worsening widespread airspace opacities in keeping with pneumonitis/pneumonia.      I personally discussed this study with GABBY WINSTON on 2/20/2024 11:42 AM.               Workstation performed: [id number]         XR chest portable ICU   Final Result by Igor Nieto MD (02/17 1309)      ET and NG tubes remain      Persistent interstitial edema.      Partial right base clearing pneumonitis with left base worsening.      The study was marked in EPIC for immediate notification.            Workstation performed: [id number]         XR chest portable ICU   Final Result by Joby Anne MD (02/14 0816)      Endotracheal tube projects 4.2 cm above the tracee with nasogastric tube below the diaphragm. No significant change in right greater than left airspace disease.            Workstation performed: [id number]         XR chest portable   Final Result by Kathi Monique MD (02/12 0819)      No definite change with persistent extensive bilateral groundglass opacity.            Workstation performed: [id number]         XR chest portable   Final Result by Ravi Rivera MD (02/10 1255)      Mild congestive changes with bilateral underlying infiltrates.            Workstation performed: [id number]         CTA chest pe study   Final Result by Kathi Monique MD (02/09 1222)      No pulmonary embolus.      No significant change since the chest CT from 2/3/2024 in extensive multifocal bilateral groundglass opacity, slightly greater on the right, and mild peripheral consolidation in the right lung with extensive  bronchial dilation which may be due to a    combination of COVID-19 and Rituxan induced pneumonitis/fibrosis.            Workstation performed: [id number]         CT chest wo contrast   Final Result by Kathi Monique MD (02/03 1740)      Persistent extensive multi focal bilateral groundglass opacity, stable on the right and slightly increased in the left upper lobe and improving right middle and right lower lobe consolidation since 1/25/2024. Persistent moderate multifocal bronchial    dilation. This could be the sequela of COVID-19 pneumonia but Rituxan induced pneumonitis cannot be excluded.         Workstation performed: [id number]         XR chest portable   Final Result by Kathi Monique MD (01/28 2052)      Persistent extensive patchy bilateral groundglass opacity and consolidation.               Workstation performed: [id number]         CTA chest pe study   Final Result by Kathi Monique MD (01/25 1620)      No pulmonary embolus.      Extensive bilateral groundglass opacity with multifocal consolidation in the right lung, greatest in the right lower lobe, compatible with pneumonia.      Mild multifocal bronchial dilation which may be due to to developing pulmonary fibrosis, although in some cases of COVID-19 pneumonia, bronchial dilation is reversible and does not represent fibrosis.      Mild calcification of the left anterior descending coronary artery indicating atherosclerotic heart disease.      Hepatic steatosis.      Workstation performed: [id number]         XR chest pa & lateral   Final Result by Nessa Schafer MD (01/22 1538)      Continued improved aeration of the bilateral airspace opacities.                  Workstation performed: [id number]         XR chest portable ICU   Final Result by Nessa Schafer MD (01/17 0922)      Improved aeration of the bilateral airspace opacities.                     Workstation performed: [id number]         XR chest portable ICU    Final Result by Eduardo Monk MD (01/15 1648)      Worsening diffuse bilateral airspace opacities.                  Workstation performed: [id number]         XR chest portable ICU   Final Result by Julio C Alegre MD (01/15 0757)   Persistent bilateral multi lobar opacities.      Workstation performed: [id number]         XR chest portable ICU   Final Result by Julio C Alegre MD (01/12 0936)      Worsening left-sided pulmonary opacities concerning for multi lobar infiltrates.                  Workstation performed: [id number]         MRI brain seizure wo and w contrast   Final Result by Manuel Mandel MD (01/11 0048)      Previously visualized diffusion signal abnormality in the right basal ganglia is no longer identified and may have been artifactual given underlying susceptibility artifact in the bilateral globus pallidus.         Workstation performed: [id number]         XR chest portable ICU   Final Result by Cooper Cai MD (01/11 0849)      1. Persistence of bilateral hazy diffuse airspace opacities in the lungs, without significant change, again suspicious for pneumonia   2. Nasogastric tube extends below left hemidiaphragm                        Workstation performed: [id number]         IR biopsy thyroid with molecular testing    (Results Pending)   IR IVC filter placement optional/temporary    (Results Pending)   XR chest portable ICU    (Results Pending)       Other Diagnostic Testing: I have personally reviewed pertinent reports.      ACTIVE MEDICATIONS     Current Facility-Administered Medications   Medication Dose Route Frequency    acetaminophen (TYLENOL) tablet 650 mg  650 mg Oral Q6H PRN    chlorhexidine (PERIDEX) 0.12 % oral rinse 15 mL  15 mL Mouth/Throat Q12H SARTHAK    dexmedeTOMIDine (Precedex) 400 mcg in sodium chloride 0.9% 100 mL  0.1-0.7 mcg/kg/hr Intravenous Titrated    fentaNYL injection 50 mcg  50 mcg Intravenous Q1H PRN    fluconazole (DIFLUCAN) (HIGH DOSE) IVPB 800 mg  800 mg  Intravenous Q24H    insulin regular (HumuLIN R,NovoLIN R) 1 Units/mL in sodium chloride 0.9 % 100 mL infusion  0.3-21 Units/hr Intravenous Titrated    lacosamide (VIMPAT) injection 100 mg  100 mg Intravenous Q12H    minocycline (DYNACIN) tablet 200 mg  200 mg Per NG Tube Q12H    nystatin (MYCOSTATIN) powder   Topical BID    ondansetron (ZOFRAN) injection 4 mg  4 mg Intravenous Q6H PRN    pantoprazole (PROTONIX) injection 40 mg  40 mg Intravenous Q12H SARTHAK    piperacillin-tazobactam (ZOSYN) 4.5 g in sodium chloride 0.9 % 100 mL IVPB (EXTENDED INFUSION)  4.5 g Intravenous Q8H    polyethylene glycol (MIRALAX) packet 17 g  17 g Per NG Tube Daily    sodium chloride (AYR SALINE NASAL) nasal gel 1 Application  1 Application Nasal Q1H PRN    sodium chloride (OCEAN) 0.65 % nasal spray 2 spray  2 spray Each Nare BID    sodium chloride 3 % inhalation solution 4 mL  4 mL Nebulization TID    sulfamethoxazole-trimethoprim (BACTRIM DS) 800-160 mg per tablet 1 tablet  1 tablet Per NG Tube Once per day on Monday Wednesday Friday       Prior to Admission medications    Medication Sig Start Date End Date Taking? Authorizing Provider   busPIRone (BUSPAR) 5 mg tablet Take 5 mg by mouth 2 (two) times a day    Historical Provider, MD   escitalopram (LEXAPRO) 10 mg tablet  5/10/21   Historical Provider, MD   ibuprofen (MOTRIN) 600 mg tablet Take 1 tablet (600 mg total) by mouth every 6 (six) hours as needed for mild pain for up to 10 days  Patient not taking: Reported on 5/16/2023 10/21/20 5/26/22  Mallory Floyd MD   lamoTRIgine (LaMICtal) 200 MG tablet Take 1 tablet (200 mg total) by mouth 2 (two) times a day 5/16/23   Jamrai Nino MD   lamoTRIgine (LaMICtal) 200 MG tablet 1 tablet by mouth twice per day. 9/12/23   Jamari Nino MD   ondansetron (ZOFRAN-ODT) 4 mg disintegrating tablet Take 1 tablet (4 mg total) by mouth every 8 (eight) hours as needed for nausea or vomiting  Patient not taking: Reported on 11/9/2020 10/21/20  11/20/20  Mallory Floyd MD   topiramate (TOPAMAX) 100 mg tablet Take 1 tablet (100 mg total) by mouth 2 (two) times a day 5/16/23   Jamari Nino MD   topiramate (TOPAMAX) 100 mg tablet Take 1 tablet (100 mg total) by mouth 2 (two) times a day 9/12/23   Jamari Nino MD   venlafaxine (EFFEXOR-XR) 75 mg 24 hr capsule Take 75 mg by mouth daily 12/18/23 12/17/24  Historical Provider, MD         VTE Pharmacologic Prophylaxis: Enoxaparin (Lovenox)  VTE Mechanical Prophylaxis: sequential compression device and foot pump applied    ======    I have discussed the patient's history, physical exam findings, assessment, and plan in detail with attending, Dr. Hill.    Thank you for allowing me to participate in the care of your patient, Carla Hunt.    Cecy Calle DO  [facility] Neurology Residency, PGY-2

## 2024-04-08 NOTE — PROCEDURES
Wound Vac change    Wound vac applied to midline abdominal wound. Dressing removed which included 1 wound manager, 1 kerlix, 1 plastic ring. Kerlix soaked in succus. Adaptic applied over exposed bowel in left upper wound the white sponge placed over fascia and exposed bowel. Ostomy paste applied to plastic ring and placed directly on the fascia isolating the ostomy. Black sponge placed in wound with hole for ostomy site. Wound covered in vac drape and opening made for ostomy. Wound manager placed over ostomy and attempt made to connect it to the plastic ring. Vac placed to suction at 50mmhg.     Patient tolerated the procedure well    Next vac change planned for 4/10

## 2024-04-08 NOTE — PROGRESS NOTES
Progress Note - Infectious Disease   Carla Hunt 59 y.o. female MRN: [id number]  [id number]      Impression/Plan:    1. Acute hypoxic respiratory failure, likely multifactorial, with both COVID and bacterial pneumonia contributing.  Also consider persistent inflammation, fibrosis as seems quite steroid responsive in past.  Most recently extubated 3/1.  Patient with worsening respiratory status requiring intubation again 3/11.  Suspect ongoing hypoxia related to persistent COVID-pneumonia, superimposed bacterial infection, inflammatory component/lung fibrosis related to COVID, now with profound deconditioning and muscle weakness.  Status post bronchoscopy and trach 3/12 with excessive secretions seen from the lower lobes bilaterally.  BAL culture from 3/11 shows heavy growth of Stenotrophomonas maltophilia, Candida albicans.  PJP DFA negative.  Fungitell was positive but patient was on beta-lactam antibiotics and had received multiple blood transfusions.  She was clinically improving and monitor off antifungals. Serum cryptococcal Ag negative. Status post 10 days of vancomycin and cefepime, 14-day course of remdesivir/Paxlovid 3/15, 14-day course of antibiotics with minocycline for stenotrophomonas pneumonia. Status post repeat bronchoscopy 3/17 for airway clearance with continued thick secretions.   Histoplasma urine antigen negative.  Patient was clinically improving and weaned to trach collar but 3/30 had worsening lethargy, again placed on the ventilator 3/31.  Repeat CT chest showed new consolidation in the right upper lobe, CT head unchanged.  Antibiotics restarted.  Sputum culture is now growing stenotrophomonas maltophilia and mixed respiratory lauren.  Course also complicated by candidemia, now ileostomy retraction and fecal contamination of the midline wound. Repeat galactomannan negative on 4/2. Bandemia noted on labs which is likely due to steroid taper. Steroids now  discontinued     -continue Zosyn extended infusion dosing in setting of fecal contamination of wound. Treat x 7 days through 4/9/24   -Continue minocycline 200 mg twice daily.  Tube feeds should be held for 1 hour prior to and 1 hour after minocycline administration. E test requested, minocycline is sensitive. Treat x 10 days, through 4/11/24  -Antifungals as below  -follow up aspergillus antigen    2. Candidemia.  2 sets of blood cultures collected 3/31 are growing Candida albicans, susceptible to fluconazole.  The patient has numerous risk factors for candidemia including presence of midline, abdominal surgery, prolonged hospitalization in the ICU, multiple courses of broad-spectrum antibiotics, leukopenia/recent neutropenia.  Suspect ileostomy retraction with wound contamination is the source.  TTE no vegetations.  Status post ophthalmology evaluation, no evidence of endophthalmitis.  MRI brain now shows bilateral infarcts, consideration for possible septic emboli. Gram stain from 4/4 cultures now with yeast in one set   -Continue fluconazole 800mg daily   -Follow-up repeat blood cultures   -Recommend SATURNINO for further evaluation due to MRI findings    3.  Sepsis, recurrent since admission.  Due to COVID-19 infection, recurrent pneumonias, cecal volvulus status post surgery and wound dehiscence, now with candidemia, fecal contamination of midline wound   -Antibiotics and antifungals as above  -Follow temperatures closely  -Recheck WBC in AM to monitor infection  -Supportive care as per the primary service  -Abdominal washout with surgery today     4.  Bilateral acute infarcts on MRI brain.  Suggesting embolic etiology.  Consideration for endocarditis in setting of fungemia.  May also be related to paradoxical embolism from right lower extremity DVT and PFO.  Per neurology, low concern this is causing her persistent encephalopathy   -Neurology following   -Recommend SATURNINO    -Plan for IVC filter today    5. Recurrent  bacterial pneumonia.  The patient has completed multiple treatment courses over the hospitalization. Prior BAL cultures with Pseudomonas and stenotrophomonas.  Unclear if pathogens or colonizers.  Status post 10 days levofloxacin.  CT C/A/P showed evolving changes likely all due to sequelae of COVID, infections.  Noted to have worsening hypoxia and purulent secretions on bronchoscopy 3/11.  BAL culture with heavy growth of Stenotrophomonas maltophilia, Candida.  Completed 14-day course of minocycline 3/27.  CT chest now shows new right upper lobe infiltrate and sputum culture is again growing Stenotrophomonas maltophilia.  Repeat CT chest 4/5 with improving but persistent groundglass and consolidative opacities.              -Antibiotics as above              -Wean O2 as able     6. Severe COVID, present on admission.  Patient was treated with a 10-day course of remdesivir, dexamethasone and was given 1 dose of Tocilizumab on admission.  COVID antigen was negative prior to coming off isolation.  Had positive COVID PCR from BAL 2/16, status post another 5-day course of remdesivir.  Patient has remained on high-dose systemic corticosteroid throughout her hospitalization.  COVID antigen positive and PCR is also positive 3/3 and Ct 26.8, consistent with high viral replication.  Repeat PCR positive with Ct closer to 30. Status post 14-day course of remdesivir/Paxlovid 3/15/2024.  Rapid COVID antigen negative 3/25 and 3/27  -Steroid wean per ICU  -No additional antivirals indicated  - Bactrim for PJP prophylaxis     7. 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  Patient is status post exploratory laparotomy with ileocecectomy and end ileostomy creation 2/20.   End ileostomy is with ongoing ischemic changes which is likely contributing to the imaging findings and ongoing SIRS response.  Now with wound dehiscence status post staple removal, status post wound VAC placement 3/13, removed but replaced due to  bleeding.  Now with retraction of ileostomy and fecal contamination through midline wound.  Status post washout 4/4. CT imaging follow-up without significant abdominal pathology.   -Serial exams  -Surgery follow-up   -Continue Zosyn   -Surgery planning for washout today     8. B-cell lymphoma, on maintenance rituxan, which is currently postponed.  Rituximab is a risk factor for persistent COVID infection.  There is now a hypovascular mass of the thyroid gland enlarging on serial imaging, concern for lymphoma.  IR consulted for biopsy.    9.  ELIESER.  Likely multifactorial due to prerenal status, medications.  With worsening BUN and creatinine.  Concern GI bleed make a contributing to high BUN.  Would also consider if this is attributing to worsening mental status.  Status post CRRT, now off with improvement in renal function   -Monitor creatinine closely   -Nephrology following    10.  Anemia, thrombocytopenia, lymphopenia/neutropenia.  Patient has had persistent lymphopenia throughout this admission, likely due to rituximab, viral infection, high-dose steroids.  Now with worsening anemia, neutropenia, and thrombocytopenia.  Bactrim discontinued 3/18. CMV PCR negative.  Not a likely side effect of minocycline.  Immunoglobulins are low.  Started on Granix 3/27, white blood cell count now improved.  May be seen in IRIS type response with multiple underlying infections. Current bandemia likely from recent G-CSF and weaning steroids.    -Steroid wean per primary   -Transfusion support per primary   -Hematology follow-up   -Monitor CBC    11.  GI bleed, ulcerations.  Status post EGD yesterday which showed erosive gastritis, esophageal tear with oozing, blood and extensive clot in the esophagus and stomach, small ulcers and trauma throughout the stomach.  Status post repeat EGD 4/4.  GMS, CMV/HSV stains negative on pathology.   -Continue PPI    Above management plan to continue antibiotics/antifungals with the critical care  team. Discussed with the patient's  at bedside.  ID will follow.    Antibiotics:  Fluconazole  Minocycline  Zosyn  Bactrim ppx    Subjective:  Per the patient's , she was awake this morning.  On my evaluation she was lethargic and not following commands, the patient's  was very upset because she received fentanyl this morning.  MRI brain over the weekend showed multiple small bilateral acute infarcts, likely embolic.    Objective:  Vitals:  Temp:  [98.1 °F (36.7 °C)-99.9 °F (37.7 °C)] 99 °F (37.2 °C)  HR:  [114-130] 122  Resp:  [20-30] 25  BP: (106-169)/(53-91) 135/64  SpO2:  [97 %-100 %] 99 %  Temp (24hrs), Av.9 °F (37.2 °C), Min:98.1 °F (36.7 °C), Max:99.9 °F (37.7 °C)  Current: Temperature: 99 °F (37.2 °C)    Physical Exam:   General Appearance:  Ill-appearing, lethargic   Neck: Trach in place.   Lungs:   Minimal rhonchi bilaterally   Heart:  RRR; no murmur, rub or gallop   Abdomen:   Midline wound with dressing in place   Extremities: No edema   Skin: No new rashes or lesions.        Labs:   All pertinent labs and imaging studies were personally reviewed  Results from last 7 days   Lab Units 24  0505 24  18324  0508   WBC Thousand/uL 16.24* 16.35* 15.69*   HEMOGLOBIN g/dL 7.6* 6.7* 7.8*   PLATELETS Thousands/uL 36* 48* 59*     Results from last 7 days   Lab Units 24  0505 24  1834 24  0508 24  1158 24  0522 24  1802 24  0752   SODIUM mmol/L 141 137 138   < > 141   < >  --    POTASSIUM mmol/L 4.4 4.4 4.9   < > 4.6   < >  --    CHLORIDE mmol/L 107 106 105   < > 108   < >  --    CO2 mmol/L 22 21 21   < > 21   < >  --    BUN mg/dL 55* 52* 46*   < > 76*   < >  --    CREATININE mg/dL 0.86 0.84 0.79   < > 1.07   < >  --    EGFR ml/min/1.73sq m 74 76 82   < > 57   < >  --    CALCIUM mg/dL 10.2 10.0 10.2   < > 10.0   < >  --    AST U/L  --   --  24  --  20  --  21   ALT U/L  --   --  37  --  32  --  34   ALK PHOS U/L  --   --  900*  --   346*  --  381*    < > = values in this interval not displayed.     Results from last 7 days   Lab Units 04/05/24  0522   PROCALCITONIN ng/ml 5.61*       Results from last 7 days   Lab Units 04/07/24  0508   CRP mg/L 165.6*       Imaging:  MRI brain: Multiple small bilateral foci of acute infarcts

## 2024-04-09 LAB
ABO GROUP BLD BPU: NORMAL
ALBUMIN SERPL BCP-MCNC: 2.4 G/DL (ref 3.5–5)
ALP SERPL-CCNC: 759 U/L (ref 34–104)
ALT SERPL W P-5'-P-CCNC: 35 U/L (ref 7–52)
AMPAR1 AB SER QL IF: NEGATIVE
AMPAR2 AB SER QL IF: NEGATIVE
AMPHIPHYSIN AB SER QL: NEGATIVE
ANION GAP SERPL CALCULATED.3IONS-SCNC: 14 MMOL/L (ref 4–13)
ANISOCYTOSIS BLD QL SMEAR: PRESENT
AST SERPL W P-5'-P-CCNC: 23 U/L (ref 13–39)
B-OH-BUTYR SERPL-MCNC: 1.28 MMOL/L (ref 0.02–0.27)
BACTERIA BLD CULT: ABNORMAL
BASOPHILS # BLD MANUAL: 0 THOUSAND/UL (ref 0–0.1)
BASOPHILS NFR MAR MANUAL: 0 % (ref 0–1)
BILIRUB DIRECT SERPL-MCNC: 1.37 MG/DL (ref 0–0.2)
BILIRUB SERPL-MCNC: 2.09 MG/DL (ref 0.2–1)
BPU ID: NORMAL
BUN SERPL-MCNC: 60 MG/DL (ref 5–25)
BURR CELLS BLD QL SMEAR: PRESENT
CALCIUM SERPL-MCNC: 10.2 MG/DL (ref 8.4–10.2)
CASPR2 IGG SERPL QL IF: NEGATIVE
CFFMA (FUNCTIONAL FIBRINOGEN MAX AMPLITUDE): 45.7 MM (ref 15–32)
CHLORIDE SERPL-SCNC: 106 MMOL/L (ref 96–108)
CKR(REACTION TIME): 8.9 MIN (ref 4.6–9.1)
CO2 SERPL-SCNC: 19 MMOL/L (ref 21–32)
CREAT SERPL-MCNC: 1.02 MG/DL (ref 0.6–1.3)
CROSSMATCH: NORMAL
CROSSMATCH: NORMAL
CRTMA(RAPIDTEG MAX AMPLITUDE): 67.7 MM (ref 52–70)
CV2 IGG SER-ACNC: NEGATIVE
DACRYOCYTES BLD QL SMEAR: PRESENT
DIFFERENTIAL COMMENT: ABNORMAL
DPPX IGG SERPL QL IF: NEGATIVE
EOSINOPHIL # BLD MANUAL: 0 THOUSAND/UL (ref 0–0.4)
EOSINOPHIL NFR BLD MANUAL: 0 % (ref 0–6)
ERYTHROCYTE [DISTWIDTH] IN BLOOD BY AUTOMATED COUNT: 16.3 % (ref 11.6–15.1)
ERYTHROCYTE [DISTWIDTH] IN BLOOD BY AUTOMATED COUNT: 16.5 % (ref 11.6–15.1)
ERYTHROCYTE [DISTWIDTH] IN BLOOD BY AUTOMATED COUNT: 17.9 % (ref 11.6–15.1)
ERYTHROCYTE [DISTWIDTH] IN BLOOD BY AUTOMATED COUNT: 18.9 % (ref 11.6–15.1)
GABABR AB SER QL IF: NEGATIVE
GAD65 IGG+IGM SER IA-SCNC: NEGATIVE NMOL/L
GFR SERPL CREATININE-BSD FRML MDRD: 60 ML/MIN/1.73SQ M
GGT SERPL-CCNC: 328 U/L (ref 9–64)
GIANT PLATELETS BLD QL SMEAR: PRESENT
GIANT PLATELETS BLD QL SMEAR: PRESENT
GLIAL NUC TYPE 1 AB SER QL IF: NEGATIVE
GLUCOSE SERPL-MCNC: 158 MG/DL (ref 65–140)
GLUCOSE SERPL-MCNC: 159 MG/DL (ref 65–140)
GLUCOSE SERPL-MCNC: 162 MG/DL (ref 65–140)
GLUCOSE SERPL-MCNC: 171 MG/DL (ref 65–140)
GLUCOSE SERPL-MCNC: 182 MG/DL (ref 65–140)
GLUCOSE SERPL-MCNC: 201 MG/DL (ref 65–140)
GLUCOSE SERPL-MCNC: 254 MG/DL (ref 65–140)
GLUCOSE SERPL-MCNC: 257 MG/DL (ref 65–140)
GLUCOSE SERPL-MCNC: 273 MG/DL (ref 65–140)
GRAM STN SPEC: ABNORMAL
HCT VFR BLD AUTO: 21.2 % (ref 34.8–46.1)
HCT VFR BLD AUTO: 25 % (ref 34.8–46.1)
HCT VFR BLD AUTO: 26.8 % (ref 34.8–46.1)
HCT VFR BLD AUTO: 26.9 % (ref 34.8–46.1)
HELMET CELLS BLD QL SMEAR: PRESENT
HGB BLD-MCNC: 6.7 G/DL (ref 11.5–15.4)
HGB BLD-MCNC: 8.7 G/DL (ref 11.5–15.4)
HGB BLD-MCNC: 8.8 G/DL (ref 11.5–15.4)
HGB BLD-MCNC: 8.9 G/DL (ref 11.5–15.4)
HU AB SER QL IF: NEGATIVE
HU2 AB SER QL IF: NEGATIVE
HU3 AB SER QL: NEGATIVE
IGLON5 IGG SER QL IF: NEGATIVE
INR PPP: 1.4 (ref 0.84–1.19)
INTERPRETATION: NEGATIVE
IP3R 1 IGG SER QL IF: NEGATIVE
LACTATE SERPL-SCNC: 2.3 MMOL/L (ref 0.5–2)
LDH SERPL-CCNC: 366 U/L (ref 140–271)
LG PLATELETS BLD QL SMEAR: PRESENT
LG PLATELETS BLD QL SMEAR: PRESENT
LGI1 IGG SERPL QL IF: NEGATIVE
LYMPHOCYTES # BLD AUTO: 0 % (ref 14–44)
LYMPHOCYTES # BLD AUTO: 0 % (ref 14–44)
LYMPHOCYTES # BLD AUTO: 0 THOUSAND/UL (ref 0.6–4.47)
LYMPHOCYTES # BLD AUTO: 0.37 THOUSAND/UL (ref 0.6–4.47)
LYMPHOCYTES # BLD AUTO: 0.58 THOUSAND/UL (ref 0.6–4.47)
LYMPHOCYTES # BLD AUTO: 0.8 THOUSAND/UL (ref 0.6–4.47)
LYMPHOCYTES # BLD AUTO: 2 % (ref 14–44)
LYMPHOCYTES # BLD AUTO: 3 % (ref 14–44)
MA+TA AB SER QL: NEGATIVE
MACROCYTES BLD QL AUTO: PRESENT
MAGNESIUM SERPL-MCNC: 1.9 MG/DL (ref 1.9–2.7)
MCH RBC QN AUTO: 29.3 PG (ref 26.8–34.3)
MCH RBC QN AUTO: 29.4 PG (ref 26.8–34.3)
MCH RBC QN AUTO: 29.6 PG (ref 26.8–34.3)
MCH RBC QN AUTO: 30.1 PG (ref 26.8–34.3)
MCHC RBC AUTO-ENTMCNC: 31.6 G/DL (ref 31.4–37.4)
MCHC RBC AUTO-ENTMCNC: 32.7 G/DL (ref 31.4–37.4)
MCHC RBC AUTO-ENTMCNC: 33.2 G/DL (ref 31.4–37.4)
MCHC RBC AUTO-ENTMCNC: 34.8 G/DL (ref 31.4–37.4)
MCV RBC AUTO: 87 FL (ref 82–98)
MCV RBC AUTO: 88 FL (ref 82–98)
MCV RBC AUTO: 90 FL (ref 82–98)
MCV RBC AUTO: 94 FL (ref 82–98)
METAMYELOCYTES NFR BLD MANUAL: 2 % (ref 0–1)
METAMYELOCYTES NFR BLD MANUAL: 7 % (ref 0–1)
METAMYELOCYTES NFR BLD MANUAL: 9 % (ref 0–1)
MGLUR1 IGG SER QL IF: NEGATIVE
MICROCYTES BLD QL AUTO: PRESENT
MICROCYTES BLD QL AUTO: PRESENT
MONOCYTES # BLD AUTO: 0 THOUSAND/UL (ref 0–1.22)
MONOCYTES # BLD AUTO: 0 THOUSAND/UL (ref 0–1.22)
MONOCYTES # BLD AUTO: 0.19 THOUSAND/UL (ref 0–1.22)
MONOCYTES # BLD AUTO: 0.82 THOUSAND/UL (ref 0–1.22)
MONOCYTES NFR BLD: 0 % (ref 4–12)
MONOCYTES NFR BLD: 0 % (ref 4–12)
MONOCYTES NFR BLD: 1 % (ref 4–12)
MONOCYTES NFR BLD: 5 % (ref 4–12)
MYELOCYTES NFR BLD MANUAL: 3 % (ref 0–1)
MYELOCYTES NFR BLD MANUAL: 4 % (ref 0–1)
MYELOCYTES NFR BLD MANUAL: 4 % (ref 0–1)
NEUTROPHILS # BLD MANUAL: 13.73 THOUSAND/UL (ref 1.85–7.62)
NEUTROPHILS # BLD MANUAL: 16.53 THOUSAND/UL (ref 1.85–7.62)
NEUTROPHILS # BLD MANUAL: 17.26 THOUSAND/UL (ref 1.85–7.62)
NEUTROPHILS # BLD MANUAL: 18.55 THOUSAND/UL (ref 1.85–7.62)
NEUTS BAND NFR BLD MANUAL: 10 % (ref 0–8)
NEUTS BAND NFR BLD MANUAL: 10 % (ref 0–8)
NEUTS BAND NFR BLD MANUAL: 12 % (ref 0–8)
NEUTS BAND NFR BLD MANUAL: 30 % (ref 0–8)
NEUTS SEG NFR BLD AUTO: 63 % (ref 43–75)
NEUTS SEG NFR BLD AUTO: 71 % (ref 43–75)
NEUTS SEG NFR BLD AUTO: 74 % (ref 43–75)
NEUTS SEG NFR BLD AUTO: 86 % (ref 43–75)
NMDAR1 AB SER QL IF: NEGATIVE
NRBC BLD AUTO-RTO: 13 /100 WBC (ref 0–2)
NRBC BLD AUTO-RTO: 16 /100 WBC (ref 0–2)
NRBC BLD AUTO-RTO: 4 /100 WBC (ref 0–2)
NRBC BLD AUTO-RTO: 7 /100 WBC (ref 0–2)
OVALOCYTES BLD QL SMEAR: PRESENT
OVALOCYTES BLD QL SMEAR: PRESENT
PATHOLOGY REVIEW: YES
PCA-1 AB SER QL IF: NEGATIVE
PCA-2 AB SER QL IF: NEGATIVE
PCA-TR AB SER QL IF: NEGATIVE
PCA-TR AB SER QL IF: NEGATIVE
PHOSPHATE SERPL-MCNC: 5.4 MG/DL (ref 2.7–4.5)
PLATELET # BLD AUTO: 39 THOUSANDS/UL (ref 149–390)
PLATELET # BLD AUTO: 48 THOUSANDS/UL (ref 149–390)
PLATELET # BLD AUTO: 54 THOUSANDS/UL (ref 149–390)
PLATELET # BLD AUTO: 58 THOUSANDS/UL (ref 149–390)
PLATELET BLD QL SMEAR: ABNORMAL
PMV BLD AUTO: 11.5 FL (ref 8.9–12.7)
PMV BLD AUTO: 12.2 FL (ref 8.9–12.7)
POIKILOCYTOSIS BLD QL SMEAR: PRESENT
POLYCHROMASIA BLD QL SMEAR: PRESENT
POTASSIUM SERPL-SCNC: 4.4 MMOL/L (ref 3.5–5.3)
PROT SERPL-MCNC: 4.3 G/DL (ref 6.4–8.4)
PROTHROMBIN TIME: 17 SECONDS (ref 11.6–14.5)
RBC # BLD AUTO: 2.26 MILLION/UL (ref 3.81–5.12)
RBC # BLD AUTO: 2.89 MILLION/UL (ref 3.81–5.12)
RBC # BLD AUTO: 2.99 MILLION/UL (ref 3.81–5.12)
RBC # BLD AUTO: 3.04 MILLION/UL (ref 3.81–5.12)
RBC MORPH BLD: PRESENT
SODIUM SERPL-SCNC: 139 MMOL/L (ref 135–147)
TOXIC GRANULES BLD QL SMEAR: PRESENT
TOXIC GRANULES BLD QL SMEAR: PRESENT
UNIT DISPENSE STATUS: NORMAL
UNIT PRODUCT CODE: NORMAL
UNIT PRODUCT VOLUME: 300 ML
UNIT PRODUCT VOLUME: 350 ML
UNIT PRODUCT VOLUME: 350 ML
UNIT RH: NORMAL
VARIANT LYMPHS # BLD AUTO: 1 %
VARIANT LYMPHS # BLD AUTO: 3 %
WBC # BLD AUTO: 16.35 THOUSAND/UL (ref 4.31–10.16)
WBC # BLD AUTO: 18.56 THOUSAND/UL (ref 4.31–10.16)
WBC # BLD AUTO: 19.32 THOUSAND/UL (ref 4.31–10.16)
WBC # BLD AUTO: 19.91 THOUSAND/UL (ref 4.31–10.16)
WBC TOXIC VACUOLES BLD QL SMEAR: PRESENT
WBC TOXIC VACUOLES BLD QL SMEAR: PRESENT
ZIC4 AB SER QL: NEGATIVE

## 2024-04-09 PROCEDURE — 85610 PROTHROMBIN TIME: CPT

## 2024-04-09 PROCEDURE — 80048 BASIC METABOLIC PNL TOTAL CA: CPT | Performed by: STUDENT IN AN ORGANIZED HEALTH CARE EDUCATION/TRAINING PROGRAM

## 2024-04-09 PROCEDURE — 99223 1ST HOSP IP/OBS HIGH 75: CPT | Performed by: INTERNAL MEDICINE

## 2024-04-09 PROCEDURE — 94003 VENT MGMT INPAT SUBQ DAY: CPT

## 2024-04-09 PROCEDURE — 87040 BLOOD CULTURE FOR BACTERIA: CPT | Performed by: STUDENT IN AN ORGANIZED HEALTH CARE EDUCATION/TRAINING PROGRAM

## 2024-04-09 PROCEDURE — 94664 DEMO&/EVAL PT USE INHALER: CPT

## 2024-04-09 PROCEDURE — 85007 BL SMEAR W/DIFF WBC COUNT: CPT

## 2024-04-09 PROCEDURE — 82948 REAGENT STRIP/BLOOD GLUCOSE: CPT

## 2024-04-09 PROCEDURE — C9113 INJ PANTOPRAZOLE SODIUM, VIA: HCPCS | Performed by: STUDENT IN AN ORGANIZED HEALTH CARE EDUCATION/TRAINING PROGRAM

## 2024-04-09 PROCEDURE — 94760 N-INVAS EAR/PLS OXIMETRY 1: CPT

## 2024-04-09 PROCEDURE — 85384 FIBRINOGEN ACTIVITY: CPT

## 2024-04-09 PROCEDURE — 85027 COMPLETE CBC AUTOMATED: CPT

## 2024-04-09 PROCEDURE — NC001 PR NO CHARGE: Performed by: STUDENT IN AN ORGANIZED HEALTH CARE EDUCATION/TRAINING PROGRAM

## 2024-04-09 PROCEDURE — 85347 COAGULATION TIME ACTIVATED: CPT

## 2024-04-09 PROCEDURE — 83010 ASSAY OF HAPTOGLOBIN QUANT: CPT | Performed by: INTERNAL MEDICINE

## 2024-04-09 PROCEDURE — 87106 FUNGI IDENTIFICATION YEAST: CPT | Performed by: STUDENT IN AN ORGANIZED HEALTH CARE EDUCATION/TRAINING PROGRAM

## 2024-04-09 PROCEDURE — 87186 SC STD MICRODIL/AGAR DIL: CPT | Performed by: STUDENT IN AN ORGANIZED HEALTH CARE EDUCATION/TRAINING PROGRAM

## 2024-04-09 PROCEDURE — 37191 INS ENDOVAS VENA CAVA FILTR: CPT | Performed by: STUDENT IN AN ORGANIZED HEALTH CARE EDUCATION/TRAINING PROGRAM

## 2024-04-09 PROCEDURE — 99233 SBSQ HOSP IP/OBS HIGH 50: CPT | Performed by: PSYCHIATRY & NEUROLOGY

## 2024-04-09 PROCEDURE — 06H03DZ INSERTION OF INTRALUMINAL DEVICE INTO INFERIOR VENA CAVA, PERCUTANEOUS APPROACH: ICD-10-PCS | Performed by: STUDENT IN AN ORGANIZED HEALTH CARE EDUCATION/TRAINING PROGRAM

## 2024-04-09 PROCEDURE — 84100 ASSAY OF PHOSPHORUS: CPT

## 2024-04-09 PROCEDURE — 99233 SBSQ HOSP IP/OBS HIGH 50: CPT | Performed by: INTERNAL MEDICINE

## 2024-04-09 PROCEDURE — 83605 ASSAY OF LACTIC ACID: CPT | Performed by: EMERGENCY MEDICINE

## 2024-04-09 PROCEDURE — 94640 AIRWAY INHALATION TREATMENT: CPT

## 2024-04-09 PROCEDURE — 85576 BLOOD PLATELET AGGREGATION: CPT

## 2024-04-09 PROCEDURE — 82977 ASSAY OF GGT: CPT

## 2024-04-09 PROCEDURE — C9254 INJECTION, LACOSAMIDE: HCPCS | Performed by: STUDENT IN AN ORGANIZED HEALTH CARE EDUCATION/TRAINING PROGRAM

## 2024-04-09 PROCEDURE — 99233 SBSQ HOSP IP/OBS HIGH 50: CPT | Performed by: STUDENT IN AN ORGANIZED HEALTH CARE EDUCATION/TRAINING PROGRAM

## 2024-04-09 PROCEDURE — 83735 ASSAY OF MAGNESIUM: CPT

## 2024-04-09 PROCEDURE — 99291 CRITICAL CARE FIRST HOUR: CPT | Performed by: INTERNAL MEDICINE

## 2024-04-09 PROCEDURE — 85397 CLOTTING FUNCT ACTIVITY: CPT

## 2024-04-09 PROCEDURE — 80076 HEPATIC FUNCTION PANEL: CPT

## 2024-04-09 PROCEDURE — 82010 KETONE BODYS QUAN: CPT | Performed by: INTERNAL MEDICINE

## 2024-04-09 PROCEDURE — 83615 LACTATE (LD) (LDH) ENZYME: CPT

## 2024-04-09 RX ORDER — FUROSEMIDE 10 MG/ML
40 INJECTION INTRAMUSCULAR; INTRAVENOUS ONCE
Status: COMPLETED | OUTPATIENT
Start: 2024-04-09 | End: 2024-04-09

## 2024-04-09 RX ORDER — LIDOCAINE WITH 8.4% SOD BICARB 0.9%(10ML)
SYRINGE (ML) INJECTION AS NEEDED
Status: COMPLETED | OUTPATIENT
Start: 2024-04-09 | End: 2024-04-09

## 2024-04-09 RX ORDER — LACOSAMIDE 100 MG/1
100 TABLET ORAL EVERY 12 HOURS SCHEDULED
Status: DISCONTINUED | OUTPATIENT
Start: 2024-04-09 | End: 2024-04-13

## 2024-04-09 RX ORDER — INSULIN LISPRO 100 [IU]/ML
1-6 INJECTION, SOLUTION INTRAVENOUS; SUBCUTANEOUS EVERY 6 HOURS SCHEDULED
Status: DISCONTINUED | OUTPATIENT
Start: 2024-04-09 | End: 2024-04-18

## 2024-04-09 RX ADMIN — IOHEXOL 100 ML: 350 INJECTION, SOLUTION INTRAVENOUS at 16:12

## 2024-04-09 RX ADMIN — Medication 800 MG: at 09:02

## 2024-04-09 RX ADMIN — NYSTATIN: 100000 POWDER TOPICAL at 08:52

## 2024-04-09 RX ADMIN — PIPERACILLIN SODIUM AND TAZOBACTAM SODIUM 4.5 G: 36; 4.5 INJECTION, POWDER, LYOPHILIZED, FOR SOLUTION INTRAVENOUS at 20:20

## 2024-04-09 RX ADMIN — CHLORHEXIDINE GLUCONATE 0.12% ORAL RINSE 15 ML: 1.2 LIQUID ORAL at 08:50

## 2024-04-09 RX ADMIN — INSULIN LISPRO 1 UNITS: 100 INJECTION, SOLUTION INTRAVENOUS; SUBCUTANEOUS at 13:10

## 2024-04-09 RX ADMIN — INSULIN LISPRO 1 UNITS: 100 INJECTION, SOLUTION INTRAVENOUS; SUBCUTANEOUS at 18:10

## 2024-04-09 RX ADMIN — Medication 2 SPRAY: at 09:22

## 2024-04-09 RX ADMIN — Medication 12.5 MG: at 10:20

## 2024-04-09 RX ADMIN — INSULIN LISPRO 1 UNITS: 100 INJECTION, SOLUTION INTRAVENOUS; SUBCUTANEOUS at 23:43

## 2024-04-09 RX ADMIN — SODIUM CHLORIDE SOLN NEBU 3% 4 ML: 3 NEBU SOLN at 19:55

## 2024-04-09 RX ADMIN — MINOCYCLINE HYDROCHLORIDE 200 MG: 50 TABLET, FILM COATED ORAL at 20:43

## 2024-04-09 RX ADMIN — NYSTATIN: 100000 POWDER TOPICAL at 17:10

## 2024-04-09 RX ADMIN — PIPERACILLIN SODIUM AND TAZOBACTAM SODIUM 4.5 G: 36; 4.5 INJECTION, POWDER, LYOPHILIZED, FOR SOLUTION INTRAVENOUS at 13:08

## 2024-04-09 RX ADMIN — LACOSAMIDE 100 MG: 100 TABLET, FILM COATED ORAL at 20:40

## 2024-04-09 RX ADMIN — Medication 10 ML: at 15:15

## 2024-04-09 RX ADMIN — PANTOPRAZOLE SODIUM 40 MG: 40 INJECTION, POWDER, FOR SOLUTION INTRAVENOUS at 08:50

## 2024-04-09 RX ADMIN — POLYETHYLENE GLYCOL 3350 17 G: 17 POWDER, FOR SOLUTION ORAL at 10:20

## 2024-04-09 RX ADMIN — PIPERACILLIN SODIUM AND TAZOBACTAM SODIUM 4.5 G: 36; 4.5 INJECTION, POWDER, LYOPHILIZED, FOR SOLUTION INTRAVENOUS at 02:08

## 2024-04-09 RX ADMIN — SODIUM CHLORIDE SOLN NEBU 3% 4 ML: 3 NEBU SOLN at 07:38

## 2024-04-09 RX ADMIN — LACOSAMIDE 100 MG: 10 INJECTION, SOLUTION INTRAVENOUS at 08:50

## 2024-04-09 RX ADMIN — CHLORHEXIDINE GLUCONATE 0.12% ORAL RINSE 15 ML: 1.2 LIQUID ORAL at 20:38

## 2024-04-09 RX ADMIN — Medication 2 SPRAY: at 20:45

## 2024-04-09 RX ADMIN — IOHEXOL 20 ML: 350 INJECTION, SOLUTION INTRAVENOUS at 15:39

## 2024-04-09 RX ADMIN — FUROSEMIDE 40 MG: 10 INJECTION, SOLUTION INTRAMUSCULAR; INTRAVENOUS at 10:21

## 2024-04-09 RX ADMIN — SODIUM CHLORIDE 4 UNITS/HR: 9 INJECTION, SOLUTION INTRAVENOUS at 07:40

## 2024-04-09 RX ADMIN — Medication 12.5 MG: at 20:40

## 2024-04-09 RX ADMIN — MINOCYCLINE HYDROCHLORIDE 200 MG: 50 TABLET, FILM COATED ORAL at 08:51

## 2024-04-09 NOTE — PROGRESS NOTES
Spiritual Care Progress Note    2024  Patient: Carla Hunt : 1965  Admission Date & Time: 1/10/2024 1941  MRN: [id number] [facility]: [id number]       attempted to visit but pt currently asleep & no family present at this time.    Chaplains still remain available.       24 1400   Clinical Encounter Type   Visited With Patient not available

## 2024-04-09 NOTE — PLAN OF CARE
Problem: Prexisting or High Potential for Compromised Skin Integrity  Goal: Skin integrity is maintained or improved  Description: INTERVENTIONS:  - Identify patients at risk for skin breakdown  - Assess and monitor skin integrity  - Assess and monitor nutrition and hydration status  - Monitor labs   - Assess for incontinence   - Turn and reposition patient  - Assist with mobility/ambulation  - Relieve pressure over bony prominences  - Avoid friction and shearing  - Provide appropriate hygiene as needed including keeping skin clean and dry  - Evaluate need for skin moisturizer/barrier cream  - Collaborate with interdisciplinary team   - Patient/family teaching  - Consider wound care consult   Outcome: Progressing     Problem: Nutrition/Hydration-ADULT  Goal: Nutrient/Hydration intake appropriate for improving, restoring or maintaining nutritional needs  Description: Monitor and assess patient's nutrition/hydration status for malnutrition. Collaborate with interdisciplinary team and initiate plan and interventions as ordered.  Monitor patient's weight and dietary intake as ordered or per policy. Utilize nutrition screening tool and intervene as necessary. Determine patient's food preferences and provide high-protein, high-caloric foods as appropriate.     INTERVENTIONS:  - Monitor oral intake, urinary output, labs, and treatment plans  - Assess nutrition and hydration status and recommend course of action  - Evaluate amount of meals eaten  - Assist patient with eating if necessary   - Allow adequate time for meals  - Recommend/ encourage appropriate diets, oral nutritional supplements, and vitamin/mineral supplements  - Order, calculate, and assess calorie counts as needed  - Recommend, monitor, and adjust tube feedings and TPN/PPN based on assessed needs  - Assess need for intravenous fluids  - Provide specific nutrition/hydration education as appropriate  - Include patient/family/caregiver in decisions related to  nutrition  Outcome: Progressing     Problem: INFECTION - ADULT  Goal: Absence or prevention of progression during hospitalization  Description: INTERVENTIONS:  - Assess and monitor for signs and symptoms of infection  - Monitor lab/diagnostic results  - Monitor all insertion sites, i.e. indwelling lines, tubes, and drains  - Monitor endotracheal if appropriate and nasal secretions for changes in amount and color  - [place] appropriate cooling/warming therapies per order  - Administer medications as ordered  - Instruct and encourage patient and family to use good hand hygiene technique  - Identify and instruct in appropriate isolation precautions for identified infection/condition  Outcome: Progressing     Problem: SAFETY ADULT  Goal: Patient will remain free of falls  Description: INTERVENTIONS:  - Educate patient/family on patient safety including physical limitations  - Instruct patient to call for assistance with activity   - Consult OT/PT to assist with strengthening/mobility   - Keep Call bell within reach  - Keep bed low and locked with side rails adjusted as appropriate  - Keep care items and personal belongings within reach  - Initiate and maintain comfort rounds  - Make Fall Risk Sign visible to staff  - Offer Toileting every 2 Hours, in advance of need  - Initiate/Maintain bed alarm  - Obtain necessary fall risk management equipment: non skid footwear  - Apply yellow socks and bracelet for high fall risk patients  - Consider moving patient to room near nurses station  Outcome: Progressing     Problem: RESPIRATORY - ADULT  Goal: Achieves optimal ventilation and oxygenation  Description: INTERVENTIONS:  - Assess for changes in respiratory status  - Assess for changes in mentation and behavior  - Position to facilitate oxygenation and minimize respiratory effort  - Oxygen administered by appropriate delivery if ordered  - Initiate smoking cessation education as indicated  - Encourage broncho-pulmonary  hygiene including cough, deep breathe, Incentive Spirometry  - Assess the need for suctioning and aspirate as needed  - Assess and instruct to report SOB or any respiratory difficulty  - Respiratory Therapy support as indicated  Outcome: Progressing     Problem: SKIN/TISSUE INTEGRITY - ADULT  Goal: Skin Integrity remains intact(Skin Breakdown Prevention)  Description: Assess:  -Perform Flavio assessment every shift   -Clean and moisturize skin every day   -Inspect skin when repositioning, toileting, and assisting with ADLS  -Assess under medical devices such as ronny  every hour   -Assess extremities for adequate circulation and sensation     Bed Management:  -Have minimal linens on bed & keep smooth, unwrinkled  -Change linens as needed when moist or perspiring  -Avoid sitting or lying in one position for more than 2 hours while in bed  -Keep HOB at 45 degrees     Toileting:  -Offer bedside commode  -Assess for incontinence every hour  -Use incontinent care products after each incontinent episode such as moisture barrier cream     Activity:  -Mobilize patient 3 times a day  -Encourage activity and walks on unit  -Encourage or provide ROM exercises   -Turn and reposition patient every 2 Hours  -Use appropriate equipment to lift or move patient in bed  -Instruct/ Assist with weight shifting every hour when out of bed in chair  -Consider limitation of chair time 2 hour intervals    Skin Care:  -Avoid use of baby powder, tape, friction and shearing, hot water or constrictive clothing  -Relieve pressure over bony prominences using allevyn   -Do not massage red bony areas    Next Steps:  -Teach patient strategies to minimize risks such as weight shifting    -Consider consults to  interdisciplinary teams such as PT  Outcome: Progressing  Goal: Incision(s), wounds(s) or drain site(s) healing without S/S of infection  Description: INTERVENTIONS  - Assess and document dressing, incision, wound bed, drain sites and  surrounding tissue  - Provide patient and family education  - Perform skin care/dressing changes every 12 hr  Outcome: Progressing  Goal: Pressure injury heals and does not worsen  Description: Interventions:  - Implement low air loss mattress or specialty surface (Criteria met)  - Apply silicone foam dressing  - Instruct/assist with weight shifting every 120  minutes when in chair   - Limit chair time to 2 hour intervals  - Use special pressure reducing interventions such as wedges when in chair   - Apply fecal or urinary incontinence containment device   - Perform passive or active ROM every 4 hr  - Turn and reposition patient & offload bony prominences every 2 hours   - Utilize friction reducing device or surface for transfers   - Consider consults to  interdisciplinary teams such as pt/ot  - Use incontinent care products after each incontinent episode such as incontinence foam  - Consider nutrition services referral as needed  Outcome: Progressing     Problem: NEUROSENSORY - ADULT  Goal: Achieves stable or improved neurological status  Description: INTERVENTIONS  - Monitor and report changes in neurological status  - Monitor vital signs such as temperature, blood pressure, glucose, and any other labs ordered   - Initiate measures to prevent increased intracranial pressure  - Monitor for seizure activity and implement precautions if appropriate      Outcome: Progressing  Goal: Achieves maximal functionality and self care  Description: INTERVENTIONS  - Monitor swallowing and airway patency with patient fatigue and changes in neurological status  - Encourage and assist patient to increase activity and self care.   - Encourage visually impaired, hearing impaired and aphasic patients to use assistive/communication devices  Outcome: Progressing     Problem: CARDIOVASCULAR - ADULT  Goal: Maintains optimal cardiac output and hemodynamic stability  Description: INTERVENTIONS:  - Monitor I/O, vital signs and rhythm  -  Monitor for S/S and trends of decreased cardiac output  - Administer and titrate ordered vasoactive medications to optimize hemodynamic stability  - Assess quality of pulses, skin color and temperature  - Assess for signs of decreased coronary artery perfusion  - Instruct patient to report change in severity of symptoms  Outcome: Progressing  Goal: Absence of cardiac dysrhythmias or at baseline rhythm  Description: INTERVENTIONS:  - Continuous cardiac monitoring, vital signs, obtain 12 lead EKG if ordered  - Administer antiarrhythmic and heart rate control medications as ordered  - Monitor electrolytes and administer replacement therapy as ordered  Outcome: Progressing     Problem: GASTROINTESTINAL - ADULT  Goal: Minimal or absence of nausea and/or vomiting  Description: INTERVENTIONS:  - Administer IV fluids if ordered to ensure adequate hydration  - Maintain NPO status until nausea and vomiting are resolved  - Nasogastric tube if ordered  - Administer ordered antiemetic medications as needed  - Provide nonpharmacologic comfort measures as appropriate  - Advance diet as tolerated, if ordered  - Consider nutrition services referral to assist patient with adequate nutrition and appropriate food choices  Outcome: Progressing  Goal: Maintains or returns to baseline bowel function  Description: INTERVENTIONS:  - Assess bowel function  - Encourage oral fluids to ensure adequate hydration  - Administer IV fluids if ordered to ensure adequate hydration  - Administer ordered medications as needed  - Encourage mobilization and activity  - Consider nutritional services referral to assist patient with adequate nutrition and appropriate food choices  Outcome: Progressing  Goal: Maintains adequate nutritional intake  Description: INTERVENTIONS:  - Monitor percentage of each meal consumed  - Identify factors contributing to decreased intake, treat as appropriate  - Assist with meals as needed  - Monitor I&O, weight, and lab  values if indicated  - Obtain nutrition services referral as needed  Outcome: Progressing  Goal: Establish and maintain optimal ostomy function  Description: INTERVENTIONS:  - Assess bowel function  - Encourage oral fluids to ensure adequate hydration  - Administer IV fluids if ordered to ensure adequate hydration   - Administer ordered medications as needed  - Encourage mobilization and activity  - Nutrition services referral to assist patient with appropriate food choices  - Assess stoma site  - Consider wound care consult   Outcome: Progressing  Goal: Oral mucous membranes remain intact  Description: INTERVENTIONS  - Assess oral mucosa and hygiene practices  - Implement preventative oral hygiene regimen  - Implement oral medicated treatments as ordered  - Initiate Nutrition services referral as needed  Outcome: Progressing     Problem: GENITOURINARY - ADULT  Goal: Maintains or returns to baseline urinary function  Description: INTERVENTIONS:  - Assess urinary function  - Encourage oral fluids to ensure adequate hydration if ordered  - Administer IV fluids as ordered to ensure adequate hydration  - Administer ordered medications as needed  - Offer frequent toileting  - Follow urinary retention protocol if ordered  Outcome: Progressing  Goal: Urinary catheter remains patent  Description: INTERVENTIONS:  - Assess patency of urinary catheter  - If patient has a chronic marie, consider changing catheter if non-functioning  - Follow guidelines for intermittent irrigation of non-functioning urinary catheter  Outcome: Progressing     Problem: METABOLIC, FLUID AND ELECTROLYTES - ADULT  Goal: Electrolytes maintained within normal limits  Description: INTERVENTIONS:  - Monitor labs and assess patient for signs and symptoms of electrolyte imbalances  - Administer electrolyte replacement as ordered  - Monitor response to electrolyte replacements, including repeat lab results as appropriate  - Instruct patient on fluid and  nutrition as appropriate  Outcome: Progressing  Goal: Fluid balance maintained  Description: INTERVENTIONS:  - Monitor labs   - Monitor I/O and WT  - Instruct patient on fluid and nutrition as appropriate  - Assess for signs & symptoms of volume excess or deficit  Outcome: Progressing  Goal: Glucose maintained within target range  Description: INTERVENTIONS:  - Monitor Blood Glucose as ordered  - Assess for signs and symptoms of hyperglycemia and hypoglycemia  - Administer ordered medications to maintain glucose within target range  - Assess nutritional intake and initiate nutrition service referral as needed  Outcome: Progressing     Problem: HEMATOLOGIC - ADULT  Goal: Maintains hematologic stability  Description: INTERVENTIONS  - Assess for signs and symptoms of bleeding or hemorrhage  - Monitor labs  - Administer supportive blood products/factors as ordered and appropriate  Outcome: Progressing     Problem: MUSCULOSKELETAL - ADULT  Goal: Maintain or return mobility to safest level of function  Description: INTERVENTIONS:  - Assess patient's ability to carry out ADLs; assess patient's baseline for ADL function and identify physical deficits which impact ability to perform ADLs (bathing, care of mouth/teeth, toileting, grooming, dressing, etc.)  - Assess/evaluate cause of self-care deficits   - Assess range of motion  - Assess patient's mobility  - Assess patient's need for assistive devices and provide as appropriate  - Encourage maximum independence but intervene and supervise when necessary  - Involve family in performance of ADLs  - Assess for home care needs following discharge   - Consider OT consult to assist with ADL evaluation and planning for discharge  - Provide patient education as appropriate  Outcome: Progressing     Problem: COPING  Goal: Pt/Family able to verbalize concerns and demonstrate effective coping strategies  Description: INTERVENTIONS:  - Assist patient/family to identify coping skills,  available support systems and cultural and spiritual values  - Provide emotional support, including active listening and acknowledgement of concerns of patient and caregivers  - Reduce environmental stimuli, as able  - Provide patient education  - Assess for spiritual pain/suffering and initiate spiritual care, including notification of Pastoral Care or [place] based community as needed  - Assess effectiveness of coping strategies  Outcome: Progressing  Goal: Will report anxiety at manageable levels  Description: INTERVENTIONS:  - Administer medication as ordered  - Teach and encourage coping skills  - Provide emotional support  - Assess patient/family for anxiety and ability to cope  Outcome: Progressing     Problem: BEHAVIOR  Goal: Pt/Family maintain appropriate behavior and adhere to behavioral management agreement, if implemented  Description: INTERVENTIONS:  - Assess the family dynamic   - Encourage verbalization of thoughts and concerns in a socially appropriate manner  - Assess patient/family's coping skills and non-compliant behavior (including use of illegal substances).  - Utilize positive, consistent limit setting strategies supporting safety of patient, staff and others  - Initiate consult with Case Management, Spiritual Care or other ancillary services as appropriate  - If a patient's/visitor's behavior jeopardizes the safety of the patient, staff, or others, refer to organization procedure.   - Notify Security of behavior or suspected illegal substances which indicate the need for search of the patient and/or belongings  - Encourage participation in the decision making process about a behavioral management agreement; implement if patient meets criteria  Outcome: Progressing     Problem: Potential for Falls  Goal: Patient will remain free of falls  Description: INTERVENTIONS:  - Educate patient/family on patient safety including physical limitations  - Instruct patient to call for assistance with activity    - Consult OT/PT to assist with strengthening/mobility   - Keep Call bell within reach  - Keep bed low and locked with side rails adjusted as appropriate  - Keep care items and personal belongings within reach  - Initiate and maintain comfort rounds  - Make Fall Risk Sign visible to staff  - Offer Toileting every 2 Hours, in advance of need  - Initiate/Maintain bed alarm  - Obtain necessary fall risk management equipment: non skid footwear  - Apply yellow socks and bracelet for high fall risk patients  - Consider moving patient to room near nurses station  Outcome: Progressing     Problem: MOBILITY - ADULT  Goal: Maintain or return to baseline ADL function  Description: INTERVENTIONS:  -  Assess patient's ability to carry out ADLs; assess patient's baseline for ADL function and identify physical deficits which impact ability to perform ADLs (bathing, care of mouth/teeth, toileting, grooming, dressing, etc.)  - Assess/evaluate cause of self-care deficits   - Assess range of motion  - Assess patient's mobility; develop plan if impaired  - Assess patient's need for assistive devices and provide as appropriate  - Encourage maximum independence but intervene and supervise when necessary  - Involve family in performance of ADLs  - Assess for home care needs following discharge   - Consider OT consult to assist with ADL evaluation and planning for discharge  - Provide patient education as appropriate  Outcome: Progressing  Goal: Maintains/Returns to pre admission functional level  Description: INTERVENTIONS:  - Perform AM-PAC 6 Click Basic Mobility/ Daily Activity assessment daily.  - Set and communicate daily mobility goal to care team and patient/family/caregiver.   - Collaborate with rehabilitation services on mobility goals if consulted  - Perform Range of Motion 4 times a day.  - Reposition patient every 2 hours.  - Dangle patient 0 times a day  - Stand patient 0 times a day  - Ambulate patient 0 times a day  - Out  of bed to chair 0 times a day   - Out of bed for meals 0 times a day  - Out of bed for toileting  - Record patient progress and toleration of activity level   Outcome: Progressing

## 2024-04-09 NOTE — PROGRESS NOTES
[facility]  Progress Note: Critical Care  Name: Carla Hunt 59 y.o. 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; complicated by poor wound healing s/p OR 4/4 with surgically created mucus fistula and end ileostomy  Esophagitis; on PPI  Bilateral ground glass opacities,  improving; persistent COIVD infection now resolved   Pancytopenia- multifactorial including hx b-cell lymphoma, persistent inflammation, s/p granix x3  Acute on chronic anemia- multifactorial-intermittent blood loss from ostomy site, chronic inflammation, iatrogenic, marrow dysfunction in setting of persistent inflammation; requiring intermittent prbc's  Lymphopenia with bandemia, in setting of high dose corticosteroids, improving s/p granix x3  CKD3  Oropharyngeal dysphagia, likely 2/2 multiple prolonged intubations; awaiting NMES  Cutaneous paratracheal ulceration  Gross hematuria, intermittent, on CBI intermittently  B-Cell lymphoma, s/p chemotheraphy 2022, Rituxan maintenance therapy on hold  Seizure disorder; on vimpat  Steroid induced hyperglycemia;on insulin gtt  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  Hx of complex migraines s/p L temporal lobectomy 2007 complicated by below  Hx of complex seizures in setting of #17, on AEDs  COVID-19 infection POA; resolved; s/p multiple courses of antivirals 2/2 persistent infection,  most recent Remdesivir course  completed 3/15, superimposed by recurrent PNA s/p multiple courses Abx  Minimal SAH POA, no interventions required, resolved on repeat CTH        Neuro:  #Alertness  - OFF modafinil 100mg qd as of 4/8  - Delirium precautions  - Patient is awake and opening eyes but not moving purposefully, will hold on any sedating medications at this time    #Analgesia  - PRN Fentanyl; holding at this time unless necessary to avoid excessive sedation     #Metabolic encephalopathy; POA  - Waxing and waning neuro exam since admission  - Most recent repeat CTH 4/5 without acute intracranial abnormality - obtained due to decline in mental status after interval improvement  - MRI brain on 4/7 showed findings suggestive of embolic etiology without significant edema, mass effect or hemorrhagic transformation (suspect septic emboli)  - Electrolytes, sodium, hyperglycemia 2/2 high dose steroids requiring insulin gtt. Ammonia normal. TME may be prolonged 2/2 PNA, possibly worsened by uremic encephalopathy worsened by ELIESER now resolved, uremia improving s/p CRRT   - Bcx 2/2 (3/31) growing fungemia, identified as candida albicans  - Bedside EGD x2 (4/2,4/3) with ulcerated mucosa in the upper third of the esophagus without evidence of hemorrhage   - Cryptococcal ag negative  - Aspergillus galactomannan ag negative  - Tb quantiferon gold indeterminate 2/2 lymphopenia    --  Plan:  - Corticosteroids now weaned to off  - CRRT off with persistent uremia   - Follow up serial blood cultures - positive for yeast  - Plan for SATURNINO as below  - Avoid PRN fentanyl/sedative meds as able.  - Delirium precautions  - Frequent neurochecks     #CVA due to multiple bilateral foci of acute infarcts   - MRI brain 4/6- multiple small bilateral foci of acute infarcts. No significant edema, mass effect, or hemorrhagic transformation -- suspicious for septic embolic phenomenon  - Not on AP/AC. Recent TTE 4/1 without vegetation   Plan:  - SATURNINO requested to assess for  endocarditis in light of brain MRI concerning for septic emboli in setting of fungemia; bubble study to assess pfo/cardioembolic phenomenon; cardiology consulted  - Avoid ASA due to risk for hemorraghic conversion in setting of possible septic emboli  - Continue statin  - Frequent neurochecks     #Seizure disorder, known history  - S/p partial left temporal lobe resection in 2007 2/2 complex migraines  - On Lamictal 150 bid and Topamax 50mg bid at home  - Last lamotrigine level from 03/02 at 10.7 (therapeutic)  - Home Lamictal switched to Vimpat 3/27 due to worsening pancytopenia, neuro following  - Continue Vimpat 100mg bid maintenance dose  - Continue home topiramate 50mg bid  - Seizure precautions      #Anxiety, known history  -On Effexor 12.5 mg TID     CV:  #Sinus tachycardia  - TTE 3/17 with no major structural dysfunction  - Multifactorial 2/2 deconditioning, dehydration/hypvol, acute on chronic anemia, underlying infectious/inflammatory process, pain/agitation  - TTE 4/1 with EF 70%, no WMA, no changes from prior  - TTE with bubble study 4/7 showed LVEF 70% with right to left shunting related to PFO vs intrapulmonary shunt  - Metoprolol tartrate for sinus tachycardia?     Pulm:  #Acute hypoxic respiratory failure;  #Bilateral ground glass opacities, improving  - Vent dependent; s/p trach 3/12 after was reintubated 3/11 due to increased WOB  - Complicated by recurrent bacterial PNA treated w 10d levaquin (completed 2/25) for MDR PNA; most recent BAL +recurrent stenotrophomona treated with Bactrim, switched to minocyline 14d course completed 3/28.  - Etiology likely multifactorial including possible COVID pneumonitis vs recurrent PNA vs hypersensitivity pneumonitis 2/2 ?rituxumab. Persistent inflammation likely given patient has been steroid responsive throughout admission.   - Repeat CXR 3/22 with significant improvement of multifocal PNA. Repeat COVID ag negative x2 3/27  - Follow up anti-Rituximab ab  -  Aspergillus galactomannan ag negative  - Tb quantiferon gold indeterminate 2/2 lymphopenia   Plan:  - S/P 14d Remdesivir course to tx COVID-19, completed 3/15  - S/P 14d Minocycline course to tx stenotrophomonas PNA, completed 3/27   - Continue second course minocycline for recurrent steno PNA as below   - Steroids now off  - Follow up serial blood cultures   - Airway clearance protocol   - IV diuresis if volume overloaded  - Continue trach collar vs vent, wean O2 as able vs mechanical ventilation for respiratory distress     GI:  #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 TT speech therapist when indicated.    - Continue tube feeds for now until potential PEG placement pending abdominal wound healing as per Gen Surg     #Esophagitis   - Acute on chronic anemia associated bloody NG output on 4/3  - Uremic, worsening suggestive of UGIB given relatively stable Cr  - Bedside EGD x2 (4/2,4/3) Ulcerated mucosa in the upper third of the esophagus without evidence of hemorrhage   Plan:  HSV/CMV/candida from GI sample negative  Continue PPI IV bid  Monitor NGT output, stool output  Concern for upper GI bleed given increasing BUN and decreased Hb (recently requiring transfusion 4/8 overnight) with thrombocytopenia and TEG normal; will lavage NGT to assess for upper GI bleed as EGD did reveal  ulcerations     :  #Uremia  - ?catabolic from steroids, may also have ?slow UGIB in setting of prolonged steroids though no overt s/s of GIB and H&H stable since 3/23 and patient is on ppi  - Trending up   - EGD without active source of bleeding but did note ulcerations  - FEurea suggestive of intrinsic pathology   - Steroids now off  - Persistent off CRRT   - See above re lavage NGT plan  - Nephrology following, appreciate recommendations  - BMP daily        F/E/N:  F: intermittent IVF boluses   E: monitor and replete for goal K>4, P>3, Mg>2  N: tube feeds with vital 1.5; 45 cc/h, Prosource liquid protein to 1 packet BID, Refugio BID to support wound healing         Heme/Onc:  #Pancytopenia, improving with intermittent plt/prbc's transfusion, s/p Filgastrim x3  - In setting of hx of B cell lymphoma, prior chemo, Rituxan therapy, prior abx and present meds including lamictal  - Hemo onc consulted, empirically given Filgastrim 300mg qd x3d (complated 3/30); IR bone marrow bx deferred by heme-onc   - Lamictal switched to Vimpat in consultation with neuro as above due to potential contribution to pancytopenia  - Trend CBC and diff daily, neutropenic precautions for ANC <500     #Acute on chronic anemia  - Baseline Hgb ~7-8. S/P 2U PRBCs 3/17 after repeat Hgb 5.3, total of 6U transfused since admission.  - Patient had significant blood loss from ostomy site 3/20, resulting in hemorraghic shock, improved with blood transfusion; hemostasis achieved after bleeding source identified and sutured. Another large vol danie bloody output from osotomy on 3/21, bleeding source within ostomy site, sutured.    - Also having intermittent vaginal bleeding  - EGD 4/3,4/4 without active bleeding  - Now with new concern as of 4/9 for recurrent bleeding given acute drop in Hb 4/8 overnight requiring transfusion + thrombocytoepnia + elevated BUN + TEG normal   - Will lavage NGT to assess for possible upper GI source given ulcerations noted  on EGD   - Also concern for hemolysis    - Check haptoglobin    - Check LDH    - Check peripheral smear   - CT chest abdomen pelvis w contrast to evaluate for intraabdominal bleed related to surgical complications in the past  - ?Worsened by impaired marrow response liekly 2/2 persistent inflammation due to low retic index ~1 prior to most recent transfusions; normocytic with normal B12/folate levels; MCV<100. Iatrogenic cause likely contributing 2/2 frequent blood draws; chronic anemia likely persistent inflammatory state/CKD/lymphoma in setting of elevated ferritin of 974. SPEP/UPEP negative.  Plan:  - Routine monitoring ostomy output, if bleeding, apply direct pressure and contact gen surg STAT for hemostasis .  - Continue tube feeds/nutritional support  - Give FFP/vitamin K to correct INR as indicated  - Trend CBC, transfuse Transfuse with leukoreduced and irradiated RBCs to maintain Hgb>7     #Thrombocytopenia  - Likely multifactorial including imparied production from marrow dysfunction in setting of persistent inflammation; RI low suggestive of hyperproliferation, hx of B-cell lymphoma, recent infections including persistent COVID, PNA treated, CMV negative. No known history of vWD/congenital disorders. No liver dysfunction; recent hepatic profile unremarkable. HIT workup negative, Hemolytic workup negative. No s/s DIC. No mechanical valves. ?Primary ITP.  Plan:  - Filgrastim 300mg x3 to aid in plt recovery  - Transfuse with leukoreduced and irradiated PLTs if count <20k due to increased risk of bleeding   - Goal >50k if bleeding; goal >20K if no bleeding  - Hold Lovenox for DVT ppx if Plt<50k.  - See plan under acute on chronic anemia      # Lymphopenia with bandemia  - In setting of high dose steroids, now off  - Severely depressed immunoglobulins inclding IgG, IgA, IgM  - At risk for further infections  - Filgrastim 300mg x3 to aid in plt recovery  - Contact and airborne precautions     #B cell lymphoma  -  Follows with heme/onc at [facility]  - S/P 6 cycles of chemotherapy in 20222  - Maintained on Rituxan for 2 year course PTA, started May 2022, last dose was 12/2024, treatment currently on hold in setting of persistent COVID-19 infection  Anti-Ritux Ab negative  Plan:  - Holding rituximab in setting of persistent COVID-19 infection, now resolved.  ?resume rituxubmab pending clinical stability & anti-ritux ab status in consultation with heme-onc     DVT ppx: Lovenox     Endo:  #Steroid hyperglycemia and diabetes mellitus type 2, A1c at 6.6 from 01/2024  - Goal -180  - Utilize SSI no insulin gtt     #R thyroid gland enlargement  - Seen on CT 4/5  - Follow up US 4/5- Limited exam due to overlying tracheostomy tube and central line catheter. Heterogeneous lobular appearance to the remaining thyroid gland, nonspecific, question sequela of thyroiditis. No discrete nodule identified.   T4/TSH unremarkable   Plan: no longer planning thyroid FNA biopsy; likely bleed related to tracheostomy insertion     ID:  #Fungemia   BCx growing yeast - most recently on 4/8, ID panel- candida albicans  In setting of severe lymphopenia and severely depressed immunoglobulins   Septic emboli noted  Ophtho consulted, no evidence of ophthalmic endophthalmitis   Plan:   Hold further micofungin doses as per ID (d/c'd 4/5)  Continue fluconazole 800 mg qd (ELIESER resolved on CRRT)  Obtain LP and check AFB, fungal culture, bacterial culture, crypto, cytology, ME panel, autoimmune panel  SATURNINO per Cardiology  Avoid broad spectrum abx as it can contribute to fungal growth; off Zosyn  Central/midlines changed after first positive Bcx for fungemia   F/U Repeat serial Bcx (q48h) to ensure clearance    ID following; all Abx will need dose adjustments if renal function worsens     #PCP ppx  On IV bactrim DS 2/2 prolonged high dose steroid therapy   Will check with ID and pharmacy regarding further bactrim as steroids are now off     #Recurrent bacterial  pneumonia  - Patient has completed multiple treatment courses of remdesivir throughout hospitalization in addition to 7 days of ceftriaxone.  - BAL cultures in 2/2024 positive for MDR Pseudomonas and stenotrophomonas treated with 10d course of Levaquin  - CT C/A/P 3/10 with persistent groundglass opacities and changes suggestive of sequelae of COVID, prior infections.  Completed 10d course of cefepime for broad spectrum coverage (completed (3/13)  - Repeat BAL cultures from 3/11 showing 4+ growth Stenotrophomonas maltophilia. Could be colonization given prior BAL growth of same, however due to recent intubation with prolonged corticosteroid theraphy, will begin treating as true pathogen. Patient otherwise remains afebrile, no leukocytosis. CRP with down trending.  Previously on Bactrim from 3/13 to 3/18, discontinued due to worsening ELIESER  Plan:  - S/P 14d Minocycline course to tx stenotrophomonas PNA, completed 3/27   - IV steroids as above  - Continue minocycline sputum Cx growing steno (3/31)         MSK/Skin:  #Midline incision wound with poor wound healing-  - General surgery performed staple removal of the patient's midline incision on 3/12 with some skin signs appreciated at the inferior aspect of the wound without obvious pus drainage. Overnight 3/13, wound dehiscence progressed requiring general surgery reevaluation. Red/brown aspirate noted, fascia intact. Wet-to-dry dressings in place. General surgery following for next Epson wound care.  - Poor wound healing in setting of high-dose steroid therapy.  No  exam no obvious signs of infection at this time.   - S/P wound vac replacement 3/13 as per gen surgery. No active concerns for cellulitis.  Plan:  - Wound VAC management as per general surgery  - Wound care for peritracheostomy wound  - Abdominal wound care as per general surgery  - Routine monitoring     #Critical illness myopathy  - Likely exacerbated by high-dose steroid therapy  Plan:  - Continue to  wean steroids as above  - Aggressive PT/OT once clinically stable           Disposition: Critical care    ICU Core Measures     Vented Patient  VAP Bundle  VAP bundle ordered     A: Assess, Prevent, and Manage Pain Has pain been assessed? NA  Need for changes to pain regimen? NA   B: Both Spontaneous Awakening Trials (SATs) and Spontaneous Breathing Trials (SBTs) Plan to perform spontaneous awakening trial today? Yes   Plan to perform spontaneous breathing trial today? Yes   Obvious barriers to extubation? No   C: Choice of Sedation RASS Goal: 0 Alert and Calm  Need for changes to sedation or analgesia regimen? No   D: Delirium CAM-ICU: Unable to perform secondary to Acute cognitive dysfunction   E: Early Mobility  Plan for early mobility? Yes   F: Family Engagement Plan for family engagement today? Yes       Antibiotic Review: Patient on appropriate coverage based on culture data.     Review of Invasive Devices:    Ortiz Plan: Continue for accurate I/O monitoring for 48 hours  Central access plan: Patient has multiple central venous catheters.       Prophylaxis:  VTE Contraindicated secondary to: Plt <50   Stress Ulcer  covered bypantoprazole (PROTONIX) injection 40 mg [[id number]]        Significant 24hr Events     24hr events: Overnight, patient required 2U pRBC and 1U platelets given acute drop in Hb to 5.7 with thrombocytopenia. Recheck shows adequate response and stable Hb and Platelets. Off modafinil yesterday. Goals of care discussions progressing with patient's  and daughter.     Subjective     Review of Systems   Unable to perform ROS: Mental status change        Objective                            Vitals I/O      Most Recent Min/Max in 24hrs   Temp (!) 97.3 °F (36.3 °C) Temp  Min: 97.3 °F (36.3 °C)  Max: 99.3 °F (37.4 °C)   Pulse 93 Pulse  Min: 93  Max: 124   Resp 21 Resp  Min: 17  Max: 36   /79 BP  Min: 106/57  Max: 141/65   O2 Sat 100 % SpO2  Min: 97 %  Max: 100 %      Intake/Output Summary  (Last 24 hours) at 2024 1301  Last data filed at 2024 1031  Gross per 24 hour   Intake 2431.31 ml   Output 1195 ml   Net 1236.31 ml       Diet NPO    Invasive Monitoring   Arterial Line  Amelia /60  No data recorded   MAP 83 mmHg  No data recorded           Physical Exam   Physical Exam  Vitals and nursing note reviewed.   Skin:     General: Skin is warm and dry.   HENT:      Head: Normocephalic and atraumatic.   Neck:      Trachea: Tracheostomy present.   Cardiovascular:      Rate and Rhythm: Regular rhythm. Tachycardia present.      Heart sounds: S1 normal and S2 normal. No murmur heard.  Musculoskeletal:      Cervical back: Neck supple.      Right lower le+ Pitting Edema present.      Left lower le+ Pitting Edema present.   Abdominal: General: Bowel sounds are normal.      Palpations: Abdomen is soft.   Constitutional:       General: She is awake. She is in acute distress.      Appearance: She is ill-appearing.      Interventions: She is intubated.   Pulmonary:      Effort: Tachypnea present. She is intubated.      Breath sounds: Normal breath sounds.   Neurological:      Mental Status: She is unresponsive.            Diagnostic Studies      EKG:   Imaging:  I have personally reviewed pertinent reports.       Medications:  Scheduled PRN   chlorhexidine, 15 mL, Q12H SARTHAK  fluconazole, 800 mg, Q24H  insulin lispro, 1-6 Units, Q6H SARTHAK  lacosamide, 100 mg, Q12H SARTHAK  metoprolol tartrate, 12.5 mg, Q12H SARTHAK  minocycline, 200 mg, Q12H  nystatin, , BID  [START ON 4/10/2024] omeprazole (PRILOSEC) suspension 2 mg/mL, 20 mg, Daily  piperacillin-tazobactam, 4.5 g, Q8H  polyethylene glycol, 17 g, Daily  sodium chloride, 2 spray, BID  sodium chloride, 4 mL, TID  sulfamethoxazole-trimethoprim, 1 tablet, Once per day on       acetaminophen, 650 mg, Q6H PRN  fentaNYL, 50 mcg, Q1H PRN  ondansetron, 4 mg, Q6H PRN  sodium chloride, 1 Application, Q1H PRN       Continuous    dexmedetomidine,  0.1-0.7 mcg/kg/hr, Last Rate: Stopped (04/08/24 1422)         Labs:    CBC    Recent Labs     04/09/24  0157 04/09/24  0446   WBC 19.32* 19.91*   HGB 8.8* 8.9*   HCT 26.9* 26.8*   PLT 58* 54*   BANDSPCT 10* 12*     BMP    Recent Labs     04/08/24  0505 04/09/24  0446   SODIUM 141 139   K 4.4 4.4    106   CO2 22 19*   AGAP 12 14*   BUN 55* 60*   CREATININE 0.86 1.02   CALCIUM 10.2 10.2       Coags    Recent Labs     04/08/24  0505 04/09/24  0446   INR 1.34* 1.40*        Additional Electrolytes  Recent Labs     04/08/24  0505 04/09/24  0446   MG 2.0 1.9   PHOS 4.8* 5.4*   CAIONIZED 1.46*  --           Blood Gas    Recent Labs     04/08/24  1155   PHART 7.339*   [id number] 37.8   PO2ART 92.8   [id number] 19.9*   BEART -5.4   SOURCE Radial, Right     Recent Labs     04/08/24  1155   SOURCE Radial, Right    LFTs  No recent results    Infectious  No recent results  Glucose  Recent Labs     04/07/24  1834 04/08/24  0505 04/09/24  0446   GLUC 187* 107 273*               Livan Morfin DO

## 2024-04-09 NOTE — DISCHARGE INSTRUCTIONS
Embolization   AMBULATORY CARE:   What you need to know about embolization: Embolization is a procedure to create a clot, or block, in a blood vessel. This stops blood from flowing to the area. The procedure may be used to treat many conditions. It can help stop heavy bleeding (hemorrhage), or prevent an aneurysm from rupturing. An abnormal connection between arteries can be removed. Embolization can stop blood flow to a tumor, such as a uterine fibroid or a cancer tumor. Chemotherapy medicine may be given during an embolization to treat a cancer tumor. This is called chemoembolization.  How to prepare for the procedure: Embolization is sometimes done as an emergency procedure. This means you will not have time to prepare. For an embolization that is not an emergency, the following are general guidelines for how to prepare:  Tell your provider about all your allergies. This includes if you have ever had an allergic reaction to contrast liquid, anesthesia, or antibiotics. You may be told not to eat or drink anything after midnight the night before your procedure. Arrange to have someone drive you home. The person should stay with you to help you and watch for problems that may develop.     Give your provider a list of your medicines. Include all medicines and supplements you take. You may need to stop taking blood thinners or aspirin several days before your procedure. This will help decrease your risk for bleeding. Do not stop taking medicines unless your healthcare provider tells you to stop. Your provider will tell you which medicines to take or not take on the day of your procedure.     You may need blood tests to check how well your blood clots and to check your kidney function. Depending on the reason for this procedure, you may an MRI, ultrasound, x-ray, or CT scan. These pictures will help your healthcare provider examine the area to be worked on.     If you are a woman, tell your provider if you know or  think you might be pregnant. You may not be able to have certain tests because they may harm an unborn baby. Your provider may need to take extra precautions for other tests.     What will happen during the procedure:   You may be given general anesthesia to keep you asleep and pain-free. You may instead be given moderate sedation. This means you will be awake during the procedure, but you should not feel any pain. Your provider will put numbing medicine on your skin where the procedure will be done. A small incision will be made over an artery. A catheter (thin tube) will be guided into the artery. Contrast liquid will be used to help your healthcare provider see your arteries more easily.     Your provider will use a type of x-ray that gives a moving picture of the arteries. This will help him or her move the catheter into the right place. The catheter is moved up until it reaches the correct artery. Your provider will put medicine or a material into the artery to slow or stop blood from flowing. This may be a coil, foam, beads, a plug, or liquid. The liquid may also contain material that is larger than blood cells.      Your provider will remove the catheter. Pressure will be used to stop any bleeding that happens. The incision area does not need to be closed with stitches. It will be small and close on its own. It will be covered with a bandage to keep it from becoming infected.     What to expect after the procedure:   You may have pain for a few days. Depending on the reason you had this procedure, you may also have a headache or cramps. You may have pain, bleeding, or bruising where the catheter went into your leg. All of these symptoms are normal and should get better soon. You may be given pain medicine through your IV or a pump. A pump allows you to control when the pain medicine is given.     You should expect to stay in the hospital at least overnight. If you had this procedure to treat heavy bleeding,  it may take 24 hours to know if the bleeding stopped.     Healthcare providers will help you walk around after your procedure. This will help prevent blood clots. Do not get up until healthcare providers say it is okay. They may want you to lie in one position for a certain amount of time. When they say it is okay to walk, they will help you stand and walk safely.     Risks of embolization: You may bleed more than expected or develop an infection. The area being treated may be damaged during the procedure. Your artery may be damaged from the catheter, or you may develop a blood clot. Your kidneys may be damaged from the contrast liquid. The material being put into the artery may go to the wrong place. This can stop blood flow to healthy tissue. The procedure may not work, or it may not relieve your symptoms.  Call your doctor or specialist if:   You have a fever higher than 100.4°F (38°C).     You have a fever, pain, and nausea that last longer than 3 days.     You suddenly have severe abdominal pain.     You cannot urinate, or you urinate very little.     You have signs of an infection at the catheter site, such as red streaks, pain, or swelling.     You have new or worsening pain.     You have questions or concerns about your condition or care.     Medicines: You may need any of the following:  NSAIDs help decrease swelling and pain or fever. This medicine is available with or without a doctor's order. NSAIDs can cause stomach bleeding or kidney problems in certain people. If you take blood thinner medicine, always ask your healthcare provider if NSAIDs are safe for you. Always read the medicine label and follow directions.     Prescription pain medicine may be given. Ask your healthcare provider how to take this medicine safely. Some prescription pain medicines contain acetaminophen. Do not take other medicines that contain acetaminophen without talking to your healthcare provider. Too much acetaminophen may  cause liver damage. Prescription pain medicine may cause constipation. Ask your healthcare provider how to prevent or treat constipation.      Take your medicine as directed. Contact your healthcare provider if you think your medicine is not helping or if you have side effects. Tell him or her if you are allergic to any medicine. Keep a list of the medicines, vitamins, and herbs you take. Include the amounts, and when and why you take them. Bring the list or the pill bottles to follow-up visits. Carry your medicine list with you in case of an emergency.     Self-care:   Rest as needed. Rest and sleep will help your body heal.     Follow your healthcare provider's instructions for activity. He or she will tell you when it is okay to return to your normal activities and to start driving. He or she may want you to wait 1 to 2 weeks to return to work.     Care for the catheter site as directed. It is okay to shower after the procedure. You will only have a small cut in your skin from where the catheter went into your leg. Check the catheter site for signs of infection, including red streaks, pain, and swelling.     Treat symptoms of postembolization syndrome. This syndrome is common after an embolization procedure. It usually starts within 72 hours of the procedure and may last a few days. The main symptoms are fever, pain, and nausea. You will probably be able to manage your symptoms at home. Acetaminophen or an NSAID, such as ibuprofen, can reduce a fever and pain. You may need to eat lightly to manage nausea. Drink more liquids for the first week after the procedure to prevent dehydration.   WOUND CARE     Remove band aid/ dressing tomorrow. You may shower 24 hours after your procedure. Shower and wash groin area or wrist area gently with soap and water: beginning tomorrow. Rinse and pat Dry. Apply new water seal band aid. Repeat this process for 5 days.  If there is any drainage from the puncture site, you should  put on a clean bandage. No Powders, creams, lotions or antibiotic ointments for 5 days.  No tub baths, hot tubs or swimming for 5 days.      Follow up with your doctor or specialist as directed: You may need to have more tests to check if the procedure worked. Write down your questions so you remember to ask them during your visits.  © Copyright [employer] 2020 Information is for End User's use only and may not be sold, redistributed or otherwise used for commercial purposes. All illustrations and images included in CareNotes® are the copyrighted property of [employer] or "[employer]  The above information is an  only. It is not intended as medical advice for individual conditions or treatments. Talk to your doctor, nurse or pharmacist before following any medical regimen to see if it is safe and effective for you.   Inferior Vena Cava Filter Placement     WHAT YOU NEED TO KNOW:   Inferior vena cava filter placement is surgery to place a filter into your inferior vena cava (IVC). The IVC is a large blood vessel that brings blood from your lower body back to your heart. The filter is a small mesh strainer made of thin wires. It is placed in the center of the IVC to trap blood clots going to your heart or lungs.    Filter types: Permanent Filters are used for patients who can't have anticoagulation medications. The filters are left in place permanently.  Optional filters: Are filters that can be removed when a patient's risk for clotting is decreased.    DISCHARGE INSTRUCTIONS:     Wound care: Keep your wound clean and dry.Band aid may come off in 24 hours.     Self-care:   Limit activity: Do not lift, pull or push heavy objects for 24 hours. Slowly start to do more each day. Return to your daily activities as directed.   Resume your normal diet. Small sips of flat soda will help with nausea.    Contact Interventional Radiology at [phone] ([facility] PATIENTS: Contact Interventional  Radiology at [phone]) ([facility] PATIENTS: Contact Interventional Radiology at [phone]) if:  You have a fever.   You have chills, a cough, or feel weak and achy.  Persistent nausea or vomiting.   Your wound is red, swollen, or draining pus.   You have questions or concerns about your condition.  Optional filters can and should  be removed when you no longer need it.  Please call Interventional Radiology to make your removal appointment.

## 2024-04-09 NOTE — BRIEF OP NOTE (RAD/CATH)
INTERVENTIONAL RADIOLOGY PROCEDURE NOTE    Date: 4/9/2024    Procedure:   tIVCF placement      Preoperative diagnosis:   1. Acute respiratory failure with hypoxia (HCC)    2. Encephalopathy acute    3. Sepsis (HCC)    4. COVID    5. Pneumonia    6. Viral pneumonia    7. Epistaxis    8. Hypotension, unspecified hypotension type    9. Pneumonia of both lungs due to infectious organism, unspecified part of lung    10. Abnormal CT of the head    11. 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 Abdominal wall cellulitis    14. Open wound of hand except fingers    15. Goals of care, counseling/discussion    16. Tracheostomy present (HCC)    17. Hypotension    18. Stage 3b chronic kidney disease (CKD) (HCC)    19. Anemia    20. Hemorrhagic shock (HCC)    21. Tachycardia    22. Sinus tachycardia    23. Pancytopenia (HCC)    24. Hyperglycemia    25. Urinary retention    26. Vaginal bleeding    27. Hospital-acquired pneumonia    28. Candidiasis    29. Hematemesis    30. Gastric ulcer    31. Uremia    32. Thyroid mass    33. Embolic stroke (HCC)    34. DVT (deep vein thrombosis) in pregnancy    35. DVT (deep venous thrombosis) (HCC)         Postoperative diagnosis: Same.    Surgeon: Shahriar Shen MD     Assistant: None. No qualified resident was available.    Blood loss: 5 ml    Specimens: none.     Findings:   tIVCF placement.    I discussed this patient's additional requests with Dr. Mays:    -regarding the thyroid biopsy, the ICU team has decided to defer on this for now given other medical issues.  IR will discontinue this order.  -regarding the LP, this will require coordination with anesthesia and challenging patient positioning.  The ICU team will reach out to the other requesting services to confirm clinical utility.  Please reach back out to IR as necessary.    Complications: None immediate.    Anesthesia: conscious sedation

## 2024-04-09 NOTE — SEDATION DOCUMENTATION
IVC filter placement completed by Dr. Shen without complications. Respiratory therapy present for procedure. Right groin access site dressed with guaze and manual pressure held. Report given to MICU RN.

## 2024-04-09 NOTE — PROGRESS NOTES
Progress Note - Infectious Disease   Carla Hunt 59 y.o. female MRN: [id number]  Unit/Bed#: MICU 10 Encounter: [id number]      Impression/Plan:    1. Acute hypoxic respiratory failure, likely multifactorial, with both COVID and bacterial pneumonia contributing.  Also consider persistent inflammation, fibrosis as seems quite steroid responsive in past.  Most recently extubated 3/1.  Patient with worsening respiratory status requiring intubation again 3/11.  Suspect ongoing hypoxia related to persistent COVID-pneumonia, superimposed bacterial infection, inflammatory component/lung fibrosis related to COVID, now with profound deconditioning and muscle weakness.  Status post bronchoscopy and trach 3/12 with excessive secretions seen from the lower lobes bilaterally.  BAL culture from 3/11 shows heavy growth of Stenotrophomonas maltophilia, Candida albicans.  PJP DFA negative.  Fungitell was positive but patient was on beta-lactam antibiotics and had received multiple blood transfusions.  She was clinically improving and monitor off antifungals. Serum cryptococcal Ag negative. Status post 10 days of vancomycin and cefepime, 14-day course of remdesivir/Paxlovid 3/15, 14-day course of antibiotics with minocycline for stenotrophomonas pneumonia. Status post repeat bronchoscopy 3/17 for airway clearance with continued thick secretions.   Histoplasma urine antigen negative.  Patient was clinically improving and weaned to trach collar but 3/30 had worsening lethargy, again placed on the ventilator 3/31.  Repeat CT chest showed new consolidation in the right upper lobe, CT head unchanged.  Antibiotics restarted.  Sputum culture is now growing stenotrophomonas maltophilia and mixed respiratory lauren.  Course also complicated by persistent candidemia, now ileostomy retraction and fecal contamination of the midline wound. Repeat galactomannan negative on 4/2.    -continue Zosyn extended infusion dosing through today in  setting of fecal contamination of wound.   -Continue minocycline 200 mg twice daily.  Tube feeds should be held for 1 hour prior to and 1 hour after minocycline administration. E test requested, minocycline is sensitive. Treat x 10 days, through 4/11/24  -Antifungals as below    2. Candidemia.  2 sets of blood cultures collected 3/31 are growing Candida albicans, susceptible to fluconazole.  The patient has numerous risk factors for candidemia including presence of midline, abdominal surgery, prolonged hospitalization in the ICU, multiple courses of broad-spectrum antibiotics, leukopenia/recent neutropenia.  Suspect ileostomy retraction with wound contamination is the source.  TTE no vegetations.  Status post ophthalmology evaluation, no evidence of endophthalmitis.  MRI brain now shows bilateral infarcts, consideration for possible septic emboli. Blood cultures from 4/4 and 4/6 also growing candida albicans in 1 of 2 sets. Persistent bacteremia raising concern for endovascular source of infection   -Continue fluconazole 800mg daily for CNS penetration   -Follow-up repeat blood cultures, will repeat additional set tomorrow   -Recommend SATURNINO for further evaluation due to persistent fungemia and MRI findings   -follow up repeat imaging    3.  Sepsis, recurrent since admission.  Due to COVID-19 infection, recurrent pneumonias, cecal volvulus status post surgery and wound dehiscence, now with candidemia, fecal contamination of midline wound   -Antibiotics and antifungals as above  -Follow temperatures closely  -Recheck WBC in AM to monitor infection  -Supportive care as per the primary service  -recommend HD catheter removal due to persistent fungemia     4.  Bilateral acute infarcts on MRI brain.  Suggesting embolic etiology.  Consideration for endocarditis in setting of fungemia.  May also be related to paradoxical embolism from right lower extremity DVT and PFO.  Per neurology, low concern this is causing her  persistent encephalopathy   -Neurology following   -Recommend SATURNINO    -Plan for IVC filter today    5. Recurrent bacterial pneumonia.  The patient has completed multiple treatment courses over the hospitalization. Prior BAL cultures with Pseudomonas and stenotrophomonas.  Unclear if pathogens or colonizers.  Status post 10 days levofloxacin.  CT C/A/P showed evolving changes likely all due to sequelae of COVID, infections.  Noted to have worsening hypoxia and purulent secretions on bronchoscopy 3/11.  BAL culture with heavy growth of Stenotrophomonas maltophilia, Candida.  Completed 14-day course of minocycline 3/27.  CT chest now shows new right upper lobe infiltrate and sputum culture is again growing Stenotrophomonas maltophilia.  Repeat CT chest 4/5 with improving but persistent groundglass and consolidative opacities.              -Antibiotics as above              -Wean O2 as able     6. Severe COVID, present on admission.  Patient was treated with a 10-day course of remdesivir, dexamethasone and was given 1 dose of Tocilizumab on admission.  COVID antigen was negative prior to coming off isolation.  Had positive COVID PCR from BAL 2/16, status post another 5-day course of remdesivir.  Patient has remained on high-dose systemic corticosteroid throughout her hospitalization.  COVID antigen positive and PCR is also positive 3/3 and Ct 26.8, consistent with high viral replication.  Repeat PCR positive with Ct closer to 30. Status post 14-day course of remdesivir/Paxlovid 3/15/2024.  Rapid COVID antigen negative 3/25 and 3/27  -Steroid wean per ICU  -No additional antivirals indicated  - Bactrim for PJP prophylaxis     7. 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  Patient is status post exploratory laparotomy with ileocecectomy and end ileostomy creation 2/20.   End ileostomy is with ongoing ischemic changes which is likely contributing to the imaging findings and ongoing SIRS response.  Now with  wound dehiscence status post staple removal, status post wound VAC placement 3/13, removed but replaced due to bleeding.  Now with retraction of ileostomy and fecal contamination through midline wound.  Status post washout 4/4. CT imaging follow-up without significant abdominal pathology.   -Serial exams  -Surgery follow-up   -Continue Zosyn   -Surgery planning for washout today     8. B-cell lymphoma, on maintenance rituxan, which is currently postponed.  Rituximab is a risk factor for persistent COVID infection.  There is now a hypovascular mass of the thyroid gland enlarging on serial imaging, concern for lymphoma.  IR consulted for biopsy.    9.  ELIESER.  Likely multifactorial due to prerenal status, medications.  With worsening BUN and creatinine.  Concern GI bleed make a contributing to high BUN.  Would also consider if this is attributing to worsening mental status.  Status post CRRT, now off with improvement in renal function   -Monitor creatinine closely   -Nephrology following    10.  Anemia, thrombocytopenia, lymphopenia/neutropenia.  Patient has had persistent lymphopenia throughout this admission, likely due to rituximab, viral infection, high-dose steroids.  Now with worsening anemia, neutropenia, and thrombocytopenia.  Bactrim discontinued 3/18. CMV PCR negative.  Not a likely side effect of minocycline.  Immunoglobulins are low.  Started on Granix 3/27, white blood cell count now improved.  May be seen in IRIS type response with multiple underlying infections. Current bandemia likely from recent G-CSF and weaning steroids.    -Steroid wean per primary   -Transfusion support per primary   -Hematology follow-up   -Monitor CBC    11.  GI bleed, ulcerations.  Status post EGD yesterday which showed erosive gastritis, esophageal tear with oozing, blood and extensive clot in the esophagus and stomach, small ulcers and trauma throughout the stomach.  Status post repeat EGD 4/4.  GMS, CMV/HSV stains negative on  pathology.   -Continue PPI    12. Persistent encephalopathy. Worsened 3/30. MRI brain shows multiple small bilateral foci of acute infarcts as described suggesting embolic etiology, partial lack of sulcal CSF suppression in the FLAIR sequence noted in the supratentorial and infratentorial brain. This could be artifactual/technique related; however, inflammatory or infectious process is not excluded. Patient opens her eyes and tracks today   -recommend LP due to persistent encephalopathy; send aerobic and anaerobic, AFB and fungal cultures, ME panel, crypto Ag, cytology   -follow up SATURNINO   -neurology following    Above management plan to continue antibiotics/antifungals, removal of HD catheter and LP with the critical care team. ID will follow.    Antibiotics:  Fluconazole  Minocycline  Zosyn  Bactrim ppx    Subjective:  The patient opened her eyes and tracked today. She is afebrile, WBC count continues to increase. Going for IVC filter today. Failed PS wean.    Objective:  Vitals:  Temp:  [97.3 °F (36.3 °C)-99.1 °F (37.3 °C)] 97.7 °F (36.5 °C)  HR:  [] 92  Resp:  [17-36] 24  BP: (115-141)/(56-79) 122/78  SpO2:  [97 %-100 %] 100 %  Temp (24hrs), Av.1 °F (36.7 °C), Min:97.3 °F (36.3 °C), Max:99.1 °F (37.3 °C)  Current: Temperature: 97.7 °F (36.5 °C)    Physical Exam:   General Appearance:  Ill-appearing, lethargic   Neck: Trach in place.   Lungs:   Minimal rhonchi bilaterally   Heart:  RRR; no murmur, rub or gallop   Abdomen:   Midline wound with dressing in place   Extremities: No edema   Skin: No new rashes or lesions.        Labs:   All pertinent labs and imaging studies were personally reviewed  Results from last 7 days   Lab Units 24  0446 24  0157 24  1759   WBC Thousand/uL 19.91* 19.32* 18.52*   HEMOGLOBIN g/dL 8.9* 8.8* 5.7*   PLATELETS Thousands/uL 54* 58* 32*     Results from last 7 days   Lab Units 24  0446 24  0505 24  1834 24  0508 24  1158  04/05/24  0522   SODIUM mmol/L 139 141 137 138   < > 141   POTASSIUM mmol/L 4.4 4.4 4.4 4.9   < > 4.6   CHLORIDE mmol/L 106 107 106 105   < > 108   CO2 mmol/L 19* 22 21 21   < > 21   BUN mg/dL 60* 55* 52* 46*   < > 76*   CREATININE mg/dL 1.02 0.86 0.84 0.79   < > 1.07   EGFR ml/min/1.73sq m 60 74 76 82   < > 57   CALCIUM mg/dL 10.2 10.2 10.0 10.2   < > 10.0   AST U/L 23  --   --  24  --  20   ALT U/L 35  --   --  37  --  32   ALK PHOS U/L 759*  --   --  900*  --  346*    < > = values in this interval not displayed.     Results from last 7 days   Lab Units 04/05/24  0522   PROCALCITONIN ng/ml 5.61*       Results from last 7 days   Lab Units 04/07/24  0508   CRP mg/L 165.6*       Imaging:  MRI brain: Multiple small bilateral foci of acute infarcts

## 2024-04-09 NOTE — RESPIRATORY THERAPY NOTE
RT Ventilator Management Note  Carla Hunt 59 y.o. female MRN: [id number]  Unit/Bed#: [facility] 10 Encounter: [id number]      Daily Screen         4/8/2024  0716 4/9/2024  0743          Patient safety screen outcome:: Failed Failed      Not Ready for Weaning due to:: Underline problem not resolved --                Physical Exam:   Assessment Type: Assess only  General Appearance: Awake  Respiratory Pattern: Assisted  Chest Assessment: Chest expansion symmetrical  Bilateral Breath Sounds: Diminished, Coarse      Resp Comments: (P) Pt received on SIMV. Tolerating well. No changes at this time. RT will cont to monitor.     04/09/24 0743   Respiratory Assessment   Resp Comments Pt received on SIMV. Tolerating well. No changes at this time. RT will cont to monitor.   Vent Information   Vent ID [id number]   Vent type Summers G5   Summers Vent Mode SIMV   $ Pulse Oximetry Spot Check Charge Completed   SIMV Settings   Resp Rate (BPM) 12 BPM   VT (mL) 450 mL   FiO2 (%) 40 %   PEEP (cmH2O) 6 cmH2O   Pressure Support (cmH2O) 10 cmH2O   Insp Time (S) 0.7 sec   P-ramp (ms) 100 (ms)   ETS (%) 25 %   Humidification Heater   Heater Temp 95 °F (35 °C)   SIMV Actuals   Resp Rate (BPM) 33 BPM   VT (mL) 351   MV (Obs) 11.3   MAP (cm H2O) 12   Peak Pressure (cmH2O) 30 cmH2O   I:E Ratio (Obs) 1/2.5   Static Compliance (mL/cmH2O) 24 mL/cmH2O   Plateau Pressure (Obs) 23   Heater Temperature (Obs) 95 °F (35 °C)   SIMV ALARMS   High Peak Pressure (cmH2O) 45 cm H2O   High Resp Rate (BPM) 40 BPM   High MV (L/min) 25 L/min   Low MV (L/min) 5 L/min   High VT (mL) 1000 mL   Low VT (mL) 200 mL   Apnea Time (s) 20 S   SIMV Apnea Settings   Resp Rate (BPM) 12 BPM   VT (mL) 450 mL   Apnea Time (S) 20 secs   %TI (%) 0.7 %   Maintenance   Alarm (pink) cable attached No   Resuscitation bag with peep valve at bedside Yes   Water bag changed No   Circuit changed No   Daily Screen   Patient safety screen outcome: Failed   IHI Ventilator Associated  Pneumonia Bundle   Daily Assessment of Readiness to Extubate Yes   Surgical Airway Shiley Cuffed   Placement Date/Time: 03/12/24 1200   Tube Size: 8 mm  Type: Tracheostomy  Brand: Naveen  Style: Cuffed   Status Cuff Inflated;Secured   Surgical Airway Cuff Pressure (color) Green   Equipment at bedside BVM;Wall Suction setup;Additional complete same size trach tube;Additional complete one size smaller trach tube;Obturator;Additional inner cannula;Sterile saline

## 2024-04-09 NOTE — QUICK NOTE
4/9/2024 9:15 AM -  Carla Hunt's chart and case were reviewed by Hannah Ramirez PA-C.  Mode of review included electronic chart check.    Carla is scheduled for IR thyroid biopsy today and IVC filter. Spouse, Min, visiting later in afternoon.    Please reference ACP note documented on 4/8. Palliative care will return for support later this week or sooner upon request.        For urgent issues or any questions/concerns, please notify on-call provider via i[employer].  You may also call our answering service 24/7 at [phone].    Hannah Ramirez PA-C  Palliative and Supportive Care  Clinic/Answering Service: [phone]  You can find me on iWarda!

## 2024-04-09 NOTE — RESPIRATORY THERAPY NOTE
RT Ventilator Management Note  Carla Hunt 59 y.o. female MRN: [id number]  Unit/Bed#: [facility] 10 Encounter: [id number]      Daily Screen         4/7/2024  0759 4/8/2024  0716          Patient safety screen outcome:: Failed Failed      Not Ready for Weaning due to:: -- Underline problem not resolved                Physical Exam:   Assessment Type: Assess only  General Appearance: Awake  Respiratory Pattern: Assisted  Chest Assessment: Chest expansion symmetrical  Bilateral Breath Sounds: Diminished, Coarse      Resp Comments: pt remained on SIMV through the night with no vent changes, tolerating well at this time     04/09/24 0430   Respiratory Assessment   Assessment Type Assess only   Respiratory Pattern Assisted   Chest Assessment Chest expansion symmetrical   Bilateral Breath Sounds Diminished;Coarse   Resp Comments pt remained on SIMV through the night with no vent changes, tolerating well at this time   Vent Information   Vent ID [id number]   Vent type Summers G5   Summers Vent Mode SIMV   $ Pulse Oximetry Spot Check Charge Completed   SIMV Settings   Resp Rate (BPM) 12 BPM   VT (mL) 450 mL   FiO2 (%) 40 %   PEEP (cmH2O) 6 cmH2O   Pressure Support (cmH2O) 10 cmH2O   Insp Time (S) 0.7 sec   P-ramp (ms) 100 (ms)   ETS (%) 25 %   Humidification Heater   Heater Temp 95 °F (35 °C)   SIMV Actuals   Resp Rate (BPM) 20 BPM   VT (mL) 512   MV (Obs) 6   MAP (cm H2O) 8.5   I:E Ratio (Obs) 1/2   Heater Temperature (Obs) 95 °F (35 °C)   SIMV ALARMS   High Peak Pressure (cmH2O) 45 cm H2O   High Resp Rate (BPM) 40 BPM   High MV (L/min) 25 L/min   Low MV (L/min) 5 L/min   High VT (mL) 1000 mL   Low VT (mL) 200 mL   Apnea Time (s) 20 S   SIMV Apnea Settings   Resp Rate (BPM) 15 BPM   VT (mL) 500 mL   Apnea Time (S) 20 secs   %TI (%) 1.3 %   Maintenance   Alarm (pink) cable attached No   Resuscitation bag with peep valve at bedside Yes   Water bag changed No   Circuit changed No   IHI Ventilator Associated Pneumonia Bundle    Head of Bed Elevated HOB 45   Surgical Airway Shiley Cuffed   Placement Date/Time: 03/12/24 1200   Tube Size: 8 mm  Type: Tracheostomy  Brand: Naveen  Style: Cuffed   Status Cuff Inflated;Secured   Surgical Airway Cuff Pressure (color) Green   Equipment at bedside BVM;Wall Suction setup;Additional complete same size trach tube;Additional complete one size smaller trach tube;Obturator;Additional inner cannula;Sterile saline

## 2024-04-09 NOTE — UTILIZATION REVIEW
"Continued Stay Review    Date: 04/09                          Current Patient Class: IP  Current Level of Care: Level 2 stepdown/HOT    HPI:59 y.o. female initially admitted on 01/10     Assessment/Plan:  Overnight, patient required 2U pRBC and 1U platelets given acute drop in Hb to 5.7 with thrombocytopenia. Recheck shows adequate response and stable Hb and Platelets. Concern for upper GI bleed given increasing BUN and decreased Hb with thrombocytopenia and TEG normal; will lavage NGT to assess for upper GI bleed as EGD did reveal ulcerations. Check haptoglobin. Check LDH. Check peripheral smear. CT chest abdomen pelvis w contrast. Obtain LP and check AFB, fungal culture, bacterial culture, crypto, cytology, ME panel, autoimmune panel. SATURNINO per Cardiology. Cont trach collar on vent. Off sedation. Off insulin gtt.  Continue zosyn, minocycline, Diflucan.     Nephrology Notes: Creatinine 1.02, UOP 1.2L in the last 24 hrs. No urgent indication for renal replacement therapy at this moment. Okay to use Lasix IV to achieve euvolemia, follow daily labs     Neurology: Recommend SATURNINO, possible getting it today. No recommending initiation of aspirin given the strong suspicion for septic emboli and the risks that these would cause bleeding. Cont Neuro checks. Maintain glucose <180, SSI. Cont statin. Cont tele.     Vital Signs: /78   Pulse 92 Comment: to IR  Temp 97.7 °F (36.5 °C)   Resp (!) 24   Ht 5' 8\" (1.727 m)   Wt 84.7 kg (186 lb 11.7 oz)   SpO2 100%   BMI 28.39 kg/m²       Pertinent Labs/Diagnostic Results:       Results from last 7 days   Lab Units 04/09/24  0446 04/09/24  0157 04/08/24  1759 04/08/24  0505 04/07/24  1834   WBC Thousand/uL 19.91* 19.32* 18.52* 16.24* 16.35*   HEMOGLOBIN g/dL 8.9* 8.8* 5.7* 7.6* 6.7*   HEMATOCRIT % 26.8* 26.9* 18.0* 23.5* 21.2*   PLATELETS Thousands/uL 54* 58* 32* 36* 48*   BANDS PCT % 12* 10* 7  --  10*         Results from last 7 days   Lab Units 04/09/24  0446 " 04/08/24  0505 04/07/24  1834 04/07/24  0508 04/06/24  1814 04/06/24  0503 04/06/24  0100 04/06/24  0100 04/06/24  0031 04/05/24  1841 04/05/24  1158   SODIUM mmol/L 139 141 137 138 136 137   < >  --   --  141 140   POTASSIUM mmol/L 4.4 4.4 4.4 4.9 4.8 4.8   < >  --   --  4.7 4.4   CHLORIDE mmol/L 106 107 106 105 104 104   < >  --   --  108 109*   CO2 mmol/L 19* 22 21 21 19* 20*   < >  --   --  20* 20*   ANION GAP mmol/L 14* 12 10 12 13 13   < >  --   --  13 11   BUN mg/dL 60* 55* 52* 46* 38* 40*   < >  --   --  51* 65*   CREATININE mg/dL 1.02 0.86 0.84 0.79 0.62 0.62   < >  --   --  0.76 0.88   EGFR ml/min/1.73sq m 60 74 76 82 99 99   < >  --   --  86 72   CALCIUM mg/dL 10.2 10.2 10.0 10.2 10.2 10.0   < >  --   --  9.8 10.0   CALCIUM, IONIZED mmol/L  --  1.46*  --   --   --  1.43*  --   --  1.41* 1.41* 1.38*   MAGNESIUM mg/dL 1.9 2.0  --  1.9  --  2.0  --  2.2  --  1.9 2.0   PHOSPHORUS mg/dL 5.4* 4.8*  --  4.6*  --  3.9  --  4.1  --  4.5 4.6*    < > = values in this interval not displayed.     Results from last 7 days   Lab Units 04/09/24  0446 04/07/24  0508 04/05/24  0522 04/04/24  0752 04/03/24  1330 04/03/24  0430   AST U/L 23 24 20 21  --  16   ALT U/L 35 37 32 34  --  29   ALK PHOS U/L 759* 900* 346* 381*  --  210*   TOTAL PROTEIN g/dL 4.3* 4.3* 5.2* 4.7*  --  5.1*   ALBUMIN g/dL 2.4* 2.3* 2.4* 2.2*  --  2.4*   TOTAL BILIRUBIN mg/dL 2.09* 1.50* 1.42* 1.46*  --  1.22*   BILIRUBIN DIRECT mg/dL 1.37* 1.00* 0.88* 0.96*  --   --    AMMONIA umol/L  --   --   --   --  28  --      Results from last 7 days   Lab Units 04/09/24  1146 04/09/24  1023 04/09/24  0734 04/09/24  0553 04/09/24  0154 04/09/24  0016 04/08/24  2000 04/08/24  1645 04/08/24  1424 04/08/24  1145 04/08/24  1038 04/08/24  0956   POC GLUCOSE mg/dl 162* 182* 254* 257* 159* 201* 154* 127 127 108 83 66     Results from last 7 days   Lab Units 04/09/24  0446 04/08/24  0505 04/07/24  1834 04/07/24  0508 04/06/24  1814 04/06/24  0503 04/06/24  0100  04/05/24  1841 04/05/24  1158 04/05/24  0522 04/04/24  2312 04/04/24  1802   GLUCOSE RANDOM mg/dL 273* 107 187* 249* 203* 144* 140 127 98 92 73 117             Beta- Hydroxybutyrate   Date Value Ref Range Status   04/09/2024 1.28 (H) 0.02 - 0.27 mmol/L Final   04/03/2024 1.10 (H) 0.02 - 0.27 mmol/L Final      Results from last 7 days   Lab Units 04/08/24  1155   PH ART  7.339*   PCO2 ART mm Hg 37.8   PO2 ART mm Hg 92.8   HCO3 ART mmol/L 19.9*   BASE EXC ART mmol/L -5.4   O2 CONTENT ART mL/dL 9.6*   O2 HGB, ARTERIAL % 95.5   ABG SOURCE  Radial, Right     Results from last 7 days   Lab Units 04/04/24  0647 04/04/24  0129   PH NICA  7.284* 7.285*   PCO2 NICA mm Hg 38.5* 37.1*   PO2 NICA mm Hg 64.3* 87.6*   HCO3 NICA mmol/L 17.8* 17.2*   BASE EXC NICA mmol/L -8.1 -8.7   O2 CONTENT NICA ml/dL 10.2 10.8   O2 HGB, VENOUS % 90.3* 94.7*         Results from last 7 days   Lab Units 04/05/24  1158 04/03/24  0430   CK TOTAL U/L 25* 24*             Results from last 7 days   Lab Units 04/09/24  0446 04/08/24  0505 04/07/24  0508 04/04/24  0425 04/04/24  0118   PROTIME seconds 17.0* 16.4* 17.0*   < > 21.6*   INR  1.40* 1.34* 1.40*   < > 1.92*   PTT seconds  --   --   --   --  40*    < > = values in this interval not displayed.     Results from last 7 days   Lab Units 04/06/24  0503   TSH 3RD GENERATON uIU/mL 0.017*     Results from last 7 days   Lab Units 04/05/24  0522   PROCALCITONIN ng/ml 5.61*     Results from last 7 days   Lab Units 04/09/24  1037 04/04/24  2312 04/04/24  1001 04/04/24  0118 04/03/24  0805   LACTIC ACID mmol/L 2.3* 0.7 1.1 1.7 1.0                         Results from last 7 days   Lab Units 04/09/24  0555 04/05/24  0555 04/04/24  0555 04/03/24  0555   UNIT PRODUCT CODE  [id number]  [id number]  [id number]  [id number]  [id number] [id number] [id number]  [id number]   UNIT NUMBER  [id number]  [id number]  [id number]  [id number]  [id number]   [id number]   UNITABO  A  A  O A  A  A A O  A   UNITRH  NEG  NEG  POS NEG  POS  NEG NEG POS  POS   CROSSMATCH  Compatible  Compatible Compatible  Compatible  --   --    UNIT DISPENSE STATUS  Presumed Trans  Presumed Trans  Presumed Trans Presumed Trans  Presumed Trans  Presumed Trans Presumed Trans Presumed Trans  Presumed Trans   UNIT PRODUCT VOL mL 350  350  300 350  250  350 250 268  280             Results from last 7 days   Lab Units 04/07/24  0508   CRP mg/L 165.6*   SED RATE mm/hour 48*             Results from last 7 days   Lab Units 04/06/24  0021 04/04/24  0129 04/03/24  1416   CLARITY UA  Extra Turbid Turbid Turbid   COLOR UA  Yellow Yellow Yellow   SPEC GRAV UA  1.024 1.017 1.017   PH UA  6.0 6.0 5.5   GLUCOSE UA mg/dl Negative Negative Negative   KETONES UA mg/dl Trace* Negative Negative   BLOOD UA  Large* Large* Large*   PROTEIN UA mg/dl 200 (2+)* 70 (1+)* 100 (2+)*   NITRITE UA  Negative Negative Negative   BILIRUBIN UA  Negative Negative Negative   UROBILINOGEN UA (BE) mg/dl <2.0 <2.0 <2.0   LEUKOCYTES UA  Large* Moderate* Large*   WBC UA /hpf Innumerable* Innumerable* Innumerable*   RBC UA /hpf Innumerable* Innumerable* Innumerable*   BACTERIA UA /hpf Moderate* None Seen Occasional   EPITHELIAL CELLS WET PREP /hpf None Seen None Seen None Seen   SODIUM UR   --   --  36   CREATININE UR mg/dL  --   --  15.8                                 Results from last 7 days   Lab Units 04/09/24  0631 04/09/24  0447 04/06/24  0453 04/06/24  0021 04/04/24  0120   BLOOD CULTURE  Received in Microbiology Lab. Culture in Progress. Received in Microbiology Lab. Culture in Progress. No Growth at 72 hrs.  Candida albicans*  --  Candida albicans*  No Growth After 5 Days.   GRAM STAIN RESULT   --   --  Budding Yeast with Pseudomycelia*  --  Yeast*   URINE CULTURE   --   --   --  >100,000 cfu/ml Candida albicans*  --                    Medications:   Scheduled Medications:  chlorhexidine, 15 mL,  Mouth/Throat, Q12H SARTHAK  fluconazole, 800 mg, Intravenous, Q24H  insulin lispro, 1-6 Units, Subcutaneous, Q6H SARTHAK  lacosamide, 100 mg, Per NG Tube, Q12H SARTHAK  metoprolol tartrate, 12.5 mg, Per NG Tube, Q12H SARTHAK  minocycline, 200 mg, Per NG Tube, Q12H  nystatin, , Topical, BID  [START ON 4/10/2024] omeprazole (PRILOSEC) suspension 2 mg/mL, 20 mg, Per NG Tube, Daily  piperacillin-tazobactam, 4.5 g, Intravenous, Q8H  polyethylene glycol, 17 g, Per NG Tube, Daily  sodium chloride, 2 spray, Each Nare, BID  sodium chloride, 4 mL, Nebulization, TID  sulfamethoxazole-trimethoprim, 1 tablet, Per NG Tube, Once per day on Monday Wednesday Friday      Continuous IV Infusions:  dexmedetomidine, 0.1-0.7 mcg/kg/hr, Intravenous, Titrated      PRN Meds:  acetaminophen, 650 mg, Oral, Q6H PRN  fentaNYL, 50 mcg, Intravenous, Q1H PRN  lidocaine 1% buffered, , Infiltration, PRN  ondansetron, 4 mg, Intravenous, Q6H PRN  sodium chloride, 1 Application, Nasal, Q1H PRN        Discharge Plan: D    Network Utilization Review Department  ATTENTION: Please call with any questions or concerns to [phone] and carefully listen to the prompts so that you are directed to the right person. All voicemails are confidential.   For Discharge needs, contact Care Management DC Support Team at [phone] opt. [phone]  Send all requests for admission clinical reviews, approved or denied determinations and any other requests to dedicated fax number below belonging to the campus where the patient is receiving treatment. List of dedicated fax numbers for the Facilities:  FACILITY NAME UR FAX NUMBER   ADMISSION DENIALS (Administrative/Medical Necessity) [phone]   DISCHARGE SUPPORT TEAM (NETWORK) [phone]   PARENT CHILD HEALTH (Maternity/NICU/Pediatrics) [phone]   General acute hospital [phone]   [facility] [phone]   [facility] [phone]   [facility]  [place] [phone]   [facility] [phone]   [facility] [phone]   [facility] [phone]   [facility] [phone]   [facility] [phone]   [facility] [phone]   [facility] [phone]   [facility] [phone]

## 2024-04-09 NOTE — PROGRESS NOTES
NEPHROLOGY PROGRESS NOTE   Carla Hunt 59 y.o. female MRN: [id number]  Unit/Bed#: [facility] 10 Encounter: [id number]      ASSESSMENT & PLAN:  1 ELIESER, as per previous note suspected secondary prior ELIESER, component of ATN, Bactrim use, GI bleeding.  Patient initially on CRRT, currently off.  Urine output 1.2 L in the last 24 hours.  Labs reviewed, creatinine up to 1.02, no urgent indication for renal replacement therapy at this moment  Continue current medical support.  Follow daily labs and monitor ins and outs.  Keep MAP over 65 mmHg.  Okay to use Lasix IV to achieve euvolemia, follow daily labs    #2  Prior elevated anion gap in the setting of ELIESER, improved with CRRT.  Noted today worsening acidosis, consider checking lactic acid, noted elevated blood sugars    3 azotemia with concern for possible uremia, initially started on CRRT on 4/4, now off.    4 acute hypoxemic respiratory failure ventilator dependent status post trach    #5 encephalopathy, suspected multifactorial, SAH, history of seizure disorder, now embolic strokes, management as per critical care team, neurology on board    #6 stenotrophomonas pneumonia, Candida pneumonia, candidemia, management as per primary team, infectious disease on board    #7 hyperphosphatemia in the setting of ELIESER, follows still elevated, when tube feeds started use Nepro    #8 anemia in the setting of GI bleeding, B-cell lymphoma, hemoglobin low but is stable, status post blood transfusion again yesterday, last hemoglobin up to 8.9 this morning    #9 thyroid mass with concern for airway compression, IR consulted for biopsy, status post trach    Recommendation was discussed with critical care team, no indication for replacement therapy at this moment, recommend workup for anion gap lactic acidosis, keep MAP over 65 mmHg, okay to use IV diuretics to achieve euvolemia follow daily labs    SUBJECTIVE:  She is seen and examined, remains trach to vent, somnolent, opening eyes to  voice but does not follow commands.  ROS unable to be obtained.  Blood pressure remained stable.  Good urine output 1.2 L documented    OBJECTIVE:  Current Weight: Weight - Scale: 84.7 kg (186 lb 11.7 oz)  Vitals:    04/09/24 0747   BP:    Pulse: (!) 116   Resp: (!) 27   Temp: 98.1 °F (36.7 °C)   SpO2: 97%       Intake/Output Summary (Last 24 hours) at 4/9/2024 0836  Last data filed at 4/9/2024 0749  Gross per 24 hour   Intake 3529.76 ml   Output 1395 ml   Net 2134.76 ml       General: Ill-appearing, opening eyes to voice, does not follow commands, in not acute distress  Eyes: conjunctivae pale, anicteric sclerae  ENT: lips and mucous membranes moist, trach to vent  Neck: supple, no JVD  Chest: Coarse breath sounds bilateral, no crackles, ronchus or wheezings  CVS: Tachycardic  Abdomen: soft, non-tender, non-distended, normoactive bowel sounds  Extremities: Positive edema of both legs  Skin: no rash  Neuro: Somnolent but opening eyes to voice, does not follow commands.  Genitourinary Ortiz catheter in place.  Temporary HD line in place        Medications:    Current Facility-Administered Medications:     acetaminophen (TYLENOL) tablet 650 mg, 650 mg, Oral, Q6H PRN, Mahamed Cardenas DO, 650 mg at 04/07/24 1257    chlorhexidine (PERIDEX) 0.12 % oral rinse 15 mL, 15 mL, Mouth/Throat, Q12H SARTHAK, Mahamed Cardenas DO, 15 mL at 04/08/24 2001    dexmedeTOMIDine (Precedex) 400 mcg in sodium chloride 0.9% 100 mL, 0.1-0.7 mcg/kg/hr, Intravenous, Titrated, Susan Prince DO, Held at 04/08/24 1422    fentaNYL injection 50 mcg, 50 mcg, Intravenous, Q1H PRN, Susan Prince DO, 50 mcg at 04/08/24 1150    fluconazole (DIFLUCAN) (HIGH DOSE) IVPB 800 mg, 800 mg, Intravenous, Q24H, Mahamed Cardenas DO, Stopped at 04/08/24 0930    insulin regular (HumuLIN R,NovoLIN R) 1 Units/mL in sodium chloride 0.9 % 100 mL infusion, 0.3-21 Units/hr, Intravenous, Titrated, Joe Lazcano DO, Last Rate: 4 mL/hr at 04/09/24 0740, 4  Units/hr at 04/09/24 0740    lacosamide (VIMPAT) injection 100 mg, 100 mg, Intravenous, Q12H, Mahamed P Cardenas, DO, 100 mg at 04/08/24 2125    minocycline (DYNACIN) tablet 200 mg, 200 mg, Per NG Tube, Q12H, Mahamed P Cardenas, DO, 200 mg at 04/08/24 2001    nystatin (MYCOSTATIN) powder, , Topical, BID, Mahamed P Cardenas, DO, Given at 04/08/24 1818    ondansetron (ZOFRAN) injection 4 mg, 4 mg, Intravenous, Q6H PRN, Mahamed P Cardenas, DO    pantoprazole (PROTONIX) injection 40 mg, 40 mg, Intravenous, Q12H SARTHAK, Mahamed P Cardenas, DO, 40 mg at 04/08/24 2001    [COMPLETED] piperacillin-tazobactam (ZOSYN) 4.5 g in sodium chloride 0.9 % 100 mL IV LOADING DOSE, 4.5 g, Intravenous, Once, 4.5 g at 04/03/24 1350 **FOLLOWED BY** piperacillin-tazobactam (ZOSYN) 4.5 g in sodium chloride 0.9 % 100 mL IVPB (EXTENDED INFUSION), 4.5 g, Intravenous, Q8H, Mahamed P Cardenas, DO, Stopped at 04/09/24 0600    polyethylene glycol (MIRALAX) packet 17 g, 17 g, Per NG Tube, Daily, Mahamed P Cardenas, DO, 17 g at 04/08/24 0831    sodium chloride (AYR SALINE NASAL) nasal gel 1 Application, 1 Application, Nasal, Q1H PRN, Mahamed P Cardenas, DO, 1 Application at 03/29/24 2212    sodium chloride (OCEAN) 0.65 % nasal spray 2 spray, 2 spray, Each Nare, BID, Mahamed P Cardenas, DO, 2 spray at 04/08/24 2106    sodium chloride 3 % inhalation solution 4 mL, 4 mL, Nebulization, TID, Mahamed P Cardenas, DO, 4 mL at 04/09/24 0738    sulfamethoxazole-trimethoprim (BACTRIM DS) 800-160 mg per tablet 1 tablet, 1 tablet, Per NG Tube, Once per day on Monday Wednesday Friday, Mahamed P Cardenas, DO, 1 tablet at 04/08/24 0915    Invasive Devices:   Urethral Catheter Three way 18 Fr. (Active)   Reasons to continue Urinary Catheter  Acute urinary retention/obstruction failing urinary retention protocol 04/07/24 2200   Goal for Removal Voiding trial when ambulation improves 04/07/24 2200   Site Assessment Clean;Skin intact 04/07/24 2200   Ortiz Care Done 04/07/24 2100   Collection Container Standard  "drainage bag 04/07/24 2200   Securement Method Securing device (Describe) 04/07/24 2200   Irrigant Normal saline 04/02/24 1207   Urethral Irrigation Intake (mL) 60 mL 04/04/24 0606   Output (mL) 100 mL 04/08/24 0415       Lab Results:   Results from last 7 days   Lab Units 04/09/24  0446 04/09/24  0157 04/08/24  1759 04/08/24  0505 04/07/24  1834 04/07/24  0508 04/05/24  1158 04/05/24  0522 04/04/24  1802 04/04/24  0752   WBC Thousand/uL 19.91* 19.32* 18.52* 16.24* 16.35* 15.69*   < > 13.25*   < > 12.73*   HEMOGLOBIN g/dL 8.9* 8.8* 5.7* 7.6* 6.7* 7.8*   < > 9.4*  9.4*   < > 7.9*   HEMATOCRIT % 26.8* 26.9* 18.0* 23.5* 21.2* 23.9*   < > 29.1*  29.0*   < > 23.4*   PLATELETS Thousands/uL 54* 58* 32* 36* 48* 59*   < > 57*   < > 68*   SODIUM mmol/L 139  --   --  141 137 138   < > 141   < >  --    POTASSIUM mmol/L 4.4  --   --  4.4 4.4 4.9   < > 4.6   < >  --    CHLORIDE mmol/L 106  --   --  107 106 105   < > 108   < >  --    CO2 mmol/L 19*  --   --  22 21 21   < > 21   < >  --    BUN mg/dL 60*  --   --  55* 52* 46*   < > 76*   < >  --    CREATININE mg/dL 1.02  --   --  0.86 0.84 0.79   < > 1.07   < >  --    CALCIUM mg/dL 10.2  --   --  10.2 10.0 10.2   < > 10.0   < >  --    MAGNESIUM mg/dL 1.9  --   --  2.0  --  1.9   < > 2.1   < >  --    PHOSPHORUS mg/dL 5.4*  --   --  4.8*  --  4.6*   < > 5.1*   < >  --    ALK PHOS U/L  --   --   --   --   --  900*  --  346*  --  381*   ALT U/L  --   --   --   --   --  37  --  32  --  34   AST U/L  --   --   --   --   --  24  --  20  --  21    < > = values in this interval not displayed.       Previous work up:  See previous notes      Portions of the record may have been created with voice recognition software. Occasional wrong word or \"sound a like\" substitutions may have occurred due to the inherent limitations of voice recognition software. Read the chart carefully and recognize, using context, where substitutions have occurred.If you have any questions, please contact the " dictating provider.

## 2024-04-09 NOTE — WOUND OSTOMY CARE
Progress Note - Wound   Carla Hunt 59 y.o. female MRN: [id number]  Unit/Bed#: MICU 10 Encounter: [id number]        Assessment:   Patient seen today for wound care follow up assessment. Patient has ileostomy for stool management and indwelling marie catheter. Patient is dependent for all care with trach and PEG, receiving enteral nutrition. Patient is in MICU on critical care low air loss mattress with ATR turning system in place and green foam wedges for repositioning. Patient is critically ill, improvements made this week with overall status.    B/L heels are intact and blanching, preventative foams and off-loading boots in place.     HA stage evolving DTI-  wound is full thickness with 50% dark nonblanchable tissue and 50% beefy red tissue, periwound skin is fragile, small serosanguineous drainage present.  HA evoling DTI right posterior thigh- pink partial thickness skin loss and yellow tissue, periwound skin is fragile and intact, scant serosanguineous drainage present.  Erosion to trach site-  full thickness skin loss with slough and pink tissue, periwound skin is fragile, small sanguineous drainage present.     Surgery team is managing ostomy/abdominal wound     No induration, fluctuance, odor, warmth/temperature differences, redness, or purulence noted to the above noted wounds and skin areas assessed. New dressings applied per orders listed below. Patient tolerated well- no s/s of non-verbal pain or discomfort observed during the encounter. Bedside nurse aware of plan of care. See flow sheets for more detailed assessment findings. Wound care will continue to follow.        Skin Care Plan:  1-B/L Sacro-Buttocks & Right Proximal Thigh/Ischium Wounds: Cleanse with soap and water and pat dry. Apply xeroform to wound beds and cover with silicone bordered foam dressings. Darren with T for Treatment and change every other day or PRN soilage/displacement.  2-Turn/reposition q2h or when medically stable for  pressure re-distribution on skin .  3-Elevate heels to offload pressure. Apply Offloading boots to b/l feet.   4-Moisturize skin daily with skin nourishing cream  5-Cleanse b/l groin with soap and water, pat dry, and dust the skin lightly with nystatin powder per order. May use alginate rope to skin folds if skin is open or macerated. Change BID and PRN.   6-Cleanse B/L Heels with soap and water. Pat dry. Apply Silicone Border Foam (Mepilex) to areas. Darren with P for Prevention and change every 3 days or PRN soilage/displacement. Peel back and inspect Q-shift.  7-Trach Wound: cleanse with NSS and pat dry. Apply Mepilex Up Dressing (cut a T into dressings to form into spilt gauze shape). Change daily or Prn soilage/displacement.    Wound 02/12/24 Pressure Injury Sacrum (Active)   Wound Image   04/09/24 1342   Wound Description Black;Epithelialization;Fragile;Non-blanchable erythema;Pink 04/09/24 1342   Pressure Injury Stage DTPI 04/09/24 1342   Sofie-wound Assessment Fragile 04/09/24 1342   Wound Length (cm) 6 cm 04/09/24 1342   Wound Width (cm) 7 cm 04/09/24 1342   Wound Depth (cm) 0.2 cm 04/09/24 1342   Wound Surface Area (cm^2) 42 cm^2 04/09/24 1342   Wound Volume (cm^3) 8.4 cm^3 04/09/24 1342   Calculated Wound Volume (cm^3) 8.4 cm^3 04/09/24 1342   Change in Wound Size % -5500 04/09/24 1342   Wound Site Closure WILL 04/09/24 1342   Drainage Amount Small 04/09/24 1342   Drainage Description Serosanguineous 04/09/24 1342   Non-staged Wound Description Full thickness 04/09/24 1342   Treatments Cleansed 04/09/24 1342   Dressing Foam, Silicon (eg. Allevyn, etc);Xeroform 04/09/24 1342   Wound packed? No 04/09/24 1342   Packing- # removed 0 04/09/24 1342   Packing- # inserted 0 04/09/24 1342   Dressing Changed New 04/09/24 1342   Patient Tolerance Tolerated well 04/09/24 1342   Dressing Status Clean;Dry;Intact 04/09/24 1342   Wound 03/22/24 Other (comment) Neck Anterior (Active)   Wound Image   04/09/24 1337   Wound  Description Yellow 04/09/24 1337   Pressure Injury Stage U 04/06/24 1200   Sofie-wound Assessment Fragile 04/09/24 1337   Wound Length (cm) 1 cm 04/09/24 1337   Wound Width (cm) 1 cm 04/09/24 1337   Wound Depth (cm) 0.2 cm 04/09/24 1337   Wound Surface Area (cm^2) 1 cm^2 04/09/24 1337   Wound Volume (cm^3) 0.2 cm^3 04/09/24 1337   Calculated Wound Volume (cm^3) 0.2 cm^3 04/09/24 1337   Change in Wound Size % 33.33 04/09/24 1337   Wound Site Closure WILL 04/09/24 1337   Drainage Amount Small 04/09/24 1337   Drainage Description Serosanguineous 04/09/24 1337   Non-staged Wound Description Full thickness 04/09/24 1337   Treatments Cleansed 04/09/24 1337   Dressing Foam 04/09/24 1337   Wound packed? No 04/09/24 1337   Packing- # removed 0 04/09/24 1337   Packing- # inserted 0 04/09/24 1337   Dressing Changed New 04/09/24 1337   Patient Tolerance Tolerated well 04/09/24 1337   Dressing Status Clean;Dry;Intact 04/09/24 1337       Wound 04/01/24 Pressure Injury Thigh Posterior;Proximal;Right (Active)   Wound Image   04/09/24 1341   Wound Description Fragile;Pink;Yellow 04/09/24 1341   Pressure Injury Stage DTPI 04/09/24 1341   Sofie-wound Assessment Fragile 04/09/24 1341   Wound Length (cm) 1.7 cm 04/09/24 1341   Wound Width (cm) 1.8 cm 04/09/24 1341   Wound Depth (cm) 0.1 cm 04/09/24 1341   Wound Surface Area (cm^2) 3.06 cm^2 04/09/24 1341   Wound Volume (cm^3) 0.306 cm^3 04/09/24 1341   Calculated Wound Volume (cm^3) 0.31 cm^3 04/09/24 1341   Change in Wound Size % -106.67 04/09/24 1341   Wound Site Closure WILL 04/09/24 1341   Drainage Amount Scant 04/09/24 1341   Drainage Description Serosanguineous 04/09/24 1341   Non-staged Wound Description Partial thickness 04/09/24 1341   Treatments Cleansed 04/09/24 1341   Dressing Foam, Silicon (eg. Allevyn, etc);Xeroform 04/09/24 1341   Wound packed? No 04/09/24 1341   Packing- # removed 0 04/09/24 1341   Packing- # inserted 0 04/09/24 1341   Dressing Changed New 04/09/24 1341    Patient Tolerance Tolerated well 04/09/24 1341   Dressing Status Clean;Dry;Intact 04/09/24 1341         Molly Bustos BSN, RN, CWOCN

## 2024-04-09 NOTE — PROGRESS NOTES
"Interventional Radiology Preprocedure Note    History/Indication for procedure:   Carla Hunt is a 59 y.o. female with a PMH of tibial DVT, gastritis, and PFO who presents for tIVCF placement.    Relevant past medical history:    Past Medical History:   Diagnosis Date    Hx of hypercalcemia     Lymphoma (HCC) 2021    Parathyroid disease (HCC)     Seizures (HCC)     Situational depression     24 yr old son  from drug overdose     Patient Active Problem List   Diagnosis    Rosacea    Anxiety state    Disorder of parathyroid gland (HCC)    Eczema    Grand mal status (HCC)    Hypercalcemia    Hypertensive disorder    Long term current use of hormonal contraceptive    Open wound of hand except fingers    Otitis externa    Overweight    Pain in face    Skin sensation disturbance    Subjective visual disturbance    Hidradenitis suppurativa of left axilla    Other specified anxiety disorders    Reactive depression    Encephalopathy    Nephrolithiasis    COVID    Stage 3b chronic kidney disease (CKD) (HCC)    Abnormal CT of the head    Acute respiratory failure with hypoxia (HCC)    Transaminitis    Teto marginal zone B-cell lymphoma (HCC)    Seizure disorder (HCC)    Hypotension    Palliative care patient    Goals of care, counseling/discussion    Acute kidney injury (HCC)    Cecal volvulus (HCC)    Drowsiness    Urinary retention    Fungemia    Pancytopenia (HCC)    Gross hematuria    Cerebrovascular accident (CVA) due to embolism (HCC)       /78   Pulse 90   Temp 97.7 °F (36.5 °C) (Rectal)   Resp 22   Ht 5' 8\" (1.727 m)   Wt 84.7 kg (186 lb 11.7 oz)   SpO2 100%   BMI 28.39 kg/m²     Medications:    Inpatient Medications:     Scheduled Medications:  Current Facility-Administered Medications   Medication Dose Route Frequency Provider Last Rate    acetaminophen  650 mg Oral Q6H PRN Mahamed Cardenas DO      chlorhexidine  15 mL Mouth/Throat Q12H SARTHAK Mahamed Cardenas DO      dexmedetomidine  0.1-0.7 " mcg/kg/hr Intravenous Titrated Susandallin Cookortmarcio, DO Stopped (04/08/24 1422)    fentaNYL  50 mcg Intravenous Q1H PRN Susan Cabrerai, DO      fluconazole  800 mg Intravenous Q24H Mahamed P Cardenas,  mg (04/09/24 0902)    insulin lispro  1-6 Units Subcutaneous Q6H [facility] Susan Cookorti, DO      lacosamide  100 mg Per NG Tube Q12H [facility] Susan Cookorti, DO      metoprolol tartrate  12.5 mg Per NG Tube Q12H [facility] Susan Cookorti, DO      minocycline  200 mg Per NG Tube Q12H Mahamed P Cardenas, DO      nystatin   Topical BID Mahamed P Cardenas, DO      [START ON 4/10/2024] omeprazole (PRILOSEC) suspension 2 mg/mL  20 mg Per NG Tube Daily Susan Cabrerai, DO      ondansetron  4 mg Intravenous Q6H PRN Mahamed P Cardenas, DO      piperacillin-tazobactam  4.5 g Intravenous Q8H Susan Cookorti, DO 4.5 g (04/09/24 1308)    polyethylene glycol  17 g Per NG Tube Daily Mahamed P Cardenas, DO      sodium chloride  1 Application Nasal Q1H PRN Mahamed P Cardenas, DO      sodium chloride  2 spray Each Nare BID Mahamed P Cardenas, DO      sodium chloride  4 mL Nebulization TID Mahamed P Cardenas, DO      sulfamethoxazole-trimethoprim  1 tablet Per NG Tube Once per day on Monday Wednesday Friday Mahamed P Cardenas, DO         Infusions:  dexmedetomidine, 0.1-0.7 mcg/kg/hr, Last Rate: Stopped (04/08/24 1422)        PRN:    acetaminophen    fentaNYL    ondansetron    sodium chloride    Outpatient Medications:  No current facility-administered medications on file prior to encounter.     Current Outpatient Medications on File Prior to Encounter   Medication Sig Dispense Refill    busPIRone (BUSPAR) 5 mg tablet Take 5 mg by mouth 2 (two) times a day      escitalopram (LEXAPRO) 10 mg tablet       ibuprofen (MOTRIN) 600 mg tablet Take 1 tablet (600 mg total) by mouth every 6 (six) hours as needed for mild pain for up to 10 days (Patient not taking: Reported on 5/16/2023) 30 tablet 0    lamoTRIgine  (LaMICtal) 200 MG tablet Take 1 tablet (200 mg total) by mouth 2 (two) times a day 180 tablet 3    lamoTRIgine (LaMICtal) 200 MG tablet 1 tablet by mouth twice per day. 14 tablet 0    ondansetron (ZOFRAN-ODT) 4 mg disintegrating tablet Take 1 tablet (4 mg total) by mouth every 8 (eight) hours as needed for nausea or vomiting (Patient not taking: Reported on 11/9/2020) 20 tablet 0    topiramate (TOPAMAX) 100 mg tablet Take 1 tablet (100 mg total) by mouth 2 (two) times a day 180 tablet 3    topiramate (TOPAMAX) 100 mg tablet Take 1 tablet (100 mg total) by mouth 2 (two) times a day 14 tablet 0    venlafaxine (EFFEXOR-XR) 75 mg 24 hr capsule Take 75 mg by mouth daily         No Known Allergies    Anticoagulants: none    ASA classification: ASA 3 - Patient with moderate systemic disease with functional limitations    Airway Assessment: III (soft and hard palate and base of uvula visible)    Relevant family history: None    Relevant review of systems: None    Prior sedation/anesthesia: yes    Can the patient lie flat? Yes     NPO Status: yes    Labs:   CBC with diff:   Lab Results   Component Value Date    WBC 19.91 (H) 04/09/2024    HGB 8.9 (L) 04/09/2024    HCT 26.8 (L) 04/09/2024    MCV 88 04/09/2024    PLT 54 (L) 04/09/2024    ADJUSTEDWBC 9.71 03/12/2024    RBC 3.04 (L) 04/09/2024    MCH 29.3 04/09/2024    MCHC 33.2 04/09/2024    RDW 16.5 (H) 04/09/2024    MPV 11.5 04/09/2024    NRBC 7 (H) 04/09/2024     BMP/CMP:  Lab Results   Component Value Date     11/13/2017    K 4.4 04/09/2024    K 4.2 12/19/2023     04/09/2024     (H) 12/19/2023    CO2 19 (L) 04/09/2024    CO2 15 (L) 03/20/2024    CO2 21 12/19/2023    BUN 60 (H) 04/09/2024    BUN 19 12/19/2023    CREATININE 1.02 04/09/2024    CREATININE 1.36 (H) 12/19/2023    GLUCOSE 118 03/20/2024    GLUCOSE 139 (H) 08/17/2017    CALCIUM 10.2 04/09/2024    CALCIUM 10.2 (H) 12/19/2023    AST 24 04/07/2024    AST 13 12/19/2023    ALT 37 04/07/2024    ALT 15  12/19/2023    ALKPHOS 900 (H) 04/07/2024    ALKPHOS 175 (H) 12/19/2023    PROT 7.7 08/17/2017    BILITOT 0.2 08/17/2017    EGFR 60 04/09/2024    EGFR 45 (L) 12/19/2023    EGFR 55 (L) 08/27/2020   ,     Coags:   Lab Results   Component Value Date    PT 14.3 07/15/2021    PTT 40 (H) 04/04/2024    INR 1.40 (H) 04/09/2024    INR 1.2 07/15/2021   ,   Results from last 7 days   Lab Units 04/09/24  0446 04/08/24  0505 04/07/24  0508 04/06/24  0813 04/05/24  1158 04/04/24  0425 04/04/24  0118   PTT seconds  --   --   --   --   --   --  40*   INR  1.40* 1.34* 1.40* 1.45* 1.63*   < > 1.92*    < > = values in this interval not displayed.        Relevant imaging studies:   Reviewed.    Directed physical examination:  I agree with the physical exam performed on 4/8/24 and there are no additional changes.    Assessment/Plan:   tIVCF placement.    Sedation/Anesthesia plan:  Moderate sedation will be used as needed for procedure.    Consent with alternatives to the procedure, risks and benefits have been explained and discussed with the patient/patient's family: yes, phone consent obtained from , Min.

## 2024-04-09 NOTE — CONSULTS
Consultation - General Cardiology Team 2  Carla Hunt 59 y.o. female MRN: [id number]  Unit/Bed#: MICU 10 Encounter: [id number]          Inpatient consult to Cardiology     Date/Time  4/9/2024 3:53 PM     Performed by  Joanna Bravo DO   Authorized by  Livan Morfin DO           PCP: Tony Guevara MD   Outpatient Cardiologist: None    History of Present Illness   Physician Requesting Consult: Clinton Mays MD  Reason for Consult / Principal Problem: SATURNINO given possible septic emboli on MRI    HPI: Carla Hunt is a 59 y.o. 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 on 1/7/2024 with a 6-day history of refusing to eat, trouble finding words, slurred speech, AMS and unsteady gait.  Initially patient was found to have COVID infection and ELIESER in the ER.  CT head at that time demonstrated bifrontal hypodensities concerning for hemorrhage versus calcifications.  Since then patient has had a very complicated hospital course which included a cecal volvulus status post bowel resection and subsequent wound dehiscence, hypoxic respiratory failure status post trach, stenotrophomonas pneumonia, acute on chronic anemia, esophagitis, metabolic encephalopathy, uremia, pancytopenia.    Patient was initially intubated due to severe COVID and was extubated on 3/1/2024 however developed worsening respiratory status requiring reintubation again on 3/11/2024.  ID suspects that hypoxemia may be due to persistent COVID-pneumonia, superimposed bacterial infection, inflammatory component/lung fibrosis related to COVID as well as profound deconditioning and muscle weakness.  Patient had bronchoscopy on 3/12/2024 with excessive secretions from lower lobes  bilaterally.  BAL from 3/11/2024 demonstrated heavy growth of stenotrophomonas maltophilia and Candida albicans.  Fungitell was positive but patient was on beta-lactam antibiotics and had received multiple blood transfusions.  Patient was on vancomycin and cefepime for 10 days and completed 14-day course of remdesivir, Paxlovid, minocycline.  Repeat bronchoscopy on 3/17/2024 was performed for airway clearance.  Patient was weaned to trach collar on 3/30/2024 but had worsening lethargy and was placed on ventilator again on 3/31/2024.  Repeat CT demonstrated new consolidation in the right upper lobe and CT head at that time was unchanged.  Antibiotics were restarted.  Sputum culture currently growing stenotrophomonas and mixed respiratory lauren.  Blood cultures with Candida and now patient has ileostomy retraction and fecal contamination of the midline wound.  Patient is on Zosyn due to fecal contamination of wound through today and she is continued on minocycline through 4/11/2024.  Patient continues on fluconazole 800 mg daily for candidemia.  MRI brain was performed due to candidemia and lethargy.  MRI brain demonstrated multiple small bilateral foci of acute infarcts involving bilateral frontal lobes, right parietal and occipital lobes, bilateral basal ganglia and bilateral corona radiata possible questionable tiny acute infarct in left occipital lobe and left cerebellum.  No edema or mass effect.  No midline shift.  Findings suggestive of embolic etiology.  TTE with bubble study was performed and demonstrated right to left shunting with saline contrast at rest and with provocation which may be related to PFO or intrapulmonary shunt.  Cardiology was consulted for SATURNINO to help further delineate etiology of embolic strokes.    Patient was evaluated at the bedside today.  She open her eyes however did not participate in exam.      Review of Systems   Reason unable to perform ROS: Unable to obtain due to patient mental  status.            Historical Information   Past Medical History:   Diagnosis Date    Hx of hypercalcemia     Lymphoma (HCC) 2021    Parathyroid disease (HCC)     Seizures (HCC)     Situational depression     24 yr old son  from drug overdose     Past Surgical History:   Procedure Laterality Date    CRANIOTOMY FOR TEMPORAL LOBECTOMY Left     DE LAPS ABD PRTM&OMENTUM DX W/WO SPEC BR/WA SPX N/A 2024    Procedure: LAPAROSCOPY DIAGNOSTIC,EXPLORATORY LAPAROTOMY, RIGHT KARY COLECTOMY,ILEOSTOMY, MUCUS FISTULA;  Surgeon: Lauryn Ullrich, DO;  Location: [facility];  Service: General    WOUND DEBRIDEMENT N/A 2024    Procedure: DEBRIDEMENT WOUND (WASH OUT), abdominal wound;  Surgeon: Andrzej Crowley DO;  Location: [facility];  Service: General     Social History     Substance and Sexual Activity   Alcohol Use Not Currently     Social History     Substance and Sexual Activity   Drug Use Never     Social History     Tobacco Use   Smoking Status Never   Smokeless Tobacco Never     Family History:   Family History   Problem Relation Age of Onset    Diabetes Mother     Cancer Father        Meds/Allergies   Hospital Medications:   Current Facility-Administered Medications   Medication Dose Route Frequency    acetaminophen (TYLENOL) tablet 650 mg  650 mg Oral Q6H PRN    chlorhexidine (PERIDEX) 0.12 % oral rinse 15 mL  15 mL Mouth/Throat Q12H SARTHAK    dexmedeTOMIDine (Precedex) 400 mcg in sodium chloride 0.9% 100 mL  0.1-0.7 mcg/kg/hr Intravenous Titrated    fentaNYL injection 50 mcg  50 mcg Intravenous Q1H PRN    fluconazole (DIFLUCAN) (HIGH DOSE) IVPB 800 mg  800 mg Intravenous Q24H    insulin lispro (HumALOG/ADMELOG) 100 units/mL subcutaneous injection 1-6 Units  1-6 Units Subcutaneous Q6H SARTHAK    lacosamide (VIMPAT) tablet 100 mg  100 mg Per NG Tube Q12H SARTHAK    metoprolol tartrate (LOPRESSOR) partial tablet 12.5 mg  12.5 mg Per NG Tube Q12H SARTHAK    minocycline (DYNACIN) tablet 200 mg  200 mg Per NG Tube  "Q12H    nystatin (MYCOSTATIN) powder   Topical BID    [START ON 4/10/2024] omeprazole (PRILOSEC) suspension 2 mg/mL  20 mg Per NG Tube Daily    ondansetron (ZOFRAN) injection 4 mg  4 mg Intravenous Q6H PRN    piperacillin-tazobactam (ZOSYN) 4.5 g in sodium chloride 0.9 % 100 mL IVPB (EXTENDED INFUSION)  4.5 g Intravenous Q8H    polyethylene glycol (MIRALAX) packet 17 g  17 g Per NG Tube Daily    sodium chloride (AYR SALINE NASAL) nasal gel 1 Application  1 Application Nasal Q1H PRN    sodium chloride (OCEAN) 0.65 % nasal spray 2 spray  2 spray Each Nare BID    sodium chloride 3 % inhalation solution 4 mL  4 mL Nebulization TID    sulfamethoxazole-trimethoprim (BACTRIM DS) 800-160 mg per tablet 1 tablet  1 tablet Per NG Tube Once per day on Monday Wednesday Friday     Home Medications:   Medications Prior to Admission   Medication    busPIRone (BUSPAR) 5 mg tablet    escitalopram (LEXAPRO) 10 mg tablet    ibuprofen (MOTRIN) 600 mg tablet    lamoTRIgine (LaMICtal) 200 MG tablet    lamoTRIgine (LaMICtal) 200 MG tablet    ondansetron (ZOFRAN-ODT) 4 mg disintegrating tablet    topiramate (TOPAMAX) 100 mg tablet    topiramate (TOPAMAX) 100 mg tablet    venlafaxine (EFFEXOR-XR) 75 mg 24 hr capsule       No Known Allergies    Objective   Vitals: Blood pressure 122/78, pulse 92, temperature 97.7 °F (36.5 °C), resp. rate (!) 24, height 5' 8\" (1.727 m), weight 84.7 kg (186 lb 11.7 oz), SpO2 100%.  Orthostatic Blood Pressures      Flowsheet Row Most Recent Value   Blood Pressure 122/78 filed at 04/09/2024 1300   Patient Position - Orthostatic VS Lying filed at 04/09/2024 0000              Invasive Devices       Peripheral Intravenous Line  Duration             Peripheral IV 04/08/24 Proximal;Right;Ventral (anterior) Forearm 1 day    Peripheral IV 04/09/24 Left;Proximal;Ventral (anterior) Forearm <1 day              Hemodialysis Catheter  Duration             HD Temporary Double Catheter 4 days              Drain  Duration       "       Ileostomy RUQ 48 days    Urethral Catheter Three way 18 Fr. 8 days    NG/OG/Enteral Tube Nasogastric 18 Fr Right nare 5 days              Airway  Duration             Surgical Airway Shiley Cuffed 28 days                    Physical Exam    GEN: Carla Hunt chronically ill-appearing female in no acute distress  HEENT:  Normocephalic, atraumatic, anicteric, moist mucous membranes  NECK: No JVD or carotid bruits.  Tracheostomy present  HEART: Regular rhythm, tachycardic normal S1 and S2, no murmurs, clicks, gallops or rubs   LUNGS: Coarse breath sounds throughout.  Tachypneic.  ABDOMEN:  Normoactive bowel sounds, soft, no tenderness, no distention  EXTREMITIES: peripheral pulses palpable; 1-2+ bilateral lower extremity edema  NEURO: no gross focal findings; cranial nerves grossly intact   SKIN:  Dry, intact, warm to touch    Lab Results: I have personally reviewed pertinent lab results.    CBC with diff:   Results from last 7 days   Lab Units 04/09/24  0446   WBC Thousand/uL 19.91*   RBC Million/uL 3.04*   HEMOGLOBIN g/dL 8.9*   HEMATOCRIT % 26.8*   MCV fL 88   MCH pg 29.3   MCHC g/dL 33.2   RDW % 16.5*   MPV fL 11.5   PLATELETS Thousands/uL 54*     CMP:   Results from last 7 days   Lab Units 04/09/24  0446   SODIUM mmol/L 139   CHLORIDE mmol/L 106   CO2 mmol/L 19*   BUN mg/dL 60*   CREATININE mg/dL 1.02   CALCIUM mg/dL 10.2   AST U/L 23   ALT U/L 35   ALK PHOS U/L 759*   EGFR ml/min/1.73sq m 60     HS Troponin:   0   Lab Value Date/Time    HSTNI 66 (H) 02/25/2024 2301    HSTNI0 60 (H) 03/09/2024 1358     BNP:   Results from last 7 days   Lab Units 04/09/24  0446   POTASSIUM mmol/L 4.4   CHLORIDE mmol/L 106   CO2 mmol/L 19*   BUN mg/dL 60*   CREATININE mg/dL 1.02   CALCIUM mg/dL 10.2   EGFR ml/min/1.73sq m 60     Coags:   Results from last 7 days   Lab Units 04/09/24  0446 04/04/24  0425 04/04/24  0118   PTT seconds  --   --  40*   INR  1.40*   < > 1.92*    < > = values in this interval not displayed.      TSH:   Results from last 7 days   Lab Units 04/06/24  0503   TSH 3RD GENERATON uIU/mL 0.017*     Magnesium:   Results from last 7 days   Lab Units 04/09/24  0446   MAGNESIUM mg/dL 1.9     Lipid Profile:         Results from last 7 days   Lab Units 04/09/24  0446 04/08/24  0505 04/07/24  1834   POTASSIUM mmol/L 4.4 4.4 4.4   CO2 mmol/L 19* 22 21   CHLORIDE mmol/L 106 107 106   BUN mg/dL 60* 55* 52*   CREATININE mg/dL 1.02 0.86 0.84     Results from last 7 days   Lab Units 04/09/24  0446 04/09/24  0157 04/08/24  1759   HEMOGLOBIN g/dL 8.9* 8.8* 5.7*   HEMATOCRIT % 26.8* 26.9* 18.0*   PLATELETS Thousands/uL 54* 58* 32*     Results from last 7 days   Lab Units 04/04/24  0118   PTT seconds 40*             Imaging: I have personally reviewed pertinent reports.   and I have personally reviewed pertinent films in PACS    Telemetry:   Sinus tachycardia    EKG:   Date: 4/3/2024  Interpretation: Sinus tachycardia, nonspecific ST changes, QTc 529      Previous STRESS TEST:  No results found for this or any previous visit.     No results found for this or any previous visit.    No results found for this or any previous visit.      Previous Cath/PCI:  No results found for this or any previous visit.      ECHO:  Complete transthoracic echo 4/7/2024      Interpretation Summary         Left Ventricle: Left ventricular cavity size is normal. Wall thickness is mildly increased. There is mild concentric hypertrophy. The left ventricular ejection fraction is 70%. Systolic function is hyperdynamic.    Atrial Septum: There is  right to left shunting noted using saline contrast at rest and with provocation (abdominal compression) which may be related to a PFO or intrapulmonary shunt.    Aorta: The aortic root is mildly dilated. The aortic root is 4.20 cm.    Technically very difficult study.    No definite evidence of valvular vegetations on the study.     Findings    Left Ventricle Left ventricular cavity size is normal. Wall  thickness is mildly increased. There is mild concentric hypertrophy. The left ventricular ejection fraction is 70%. Systolic function is hyperdynamic. Although no diagnostic regional wall motion abnormality was identified, this possibility cannot be completely excluded on the basis of this study. Unable to assess diastolic function.   Right Ventricle Right ventricular cavity size is normal. Systolic function is normal.   Left Atrium The atrium is normal in size.   Right Atrium The atrium is normal in size.   Atrial Septum There is a right to left shunting noted using saline contrast at rest and with provocation (abdominal compression) which may be related to a PFO.   Aortic Valve The aortic valve was not well visualized. The leaflets exhibit normal mobility. There is no evidence of regurgitation. The aortic valve has no significant stenosis.   Mitral Valve Mitral valve structure is normal.  There is trace regurgitation. There is no evidence of stenosis.   Tricuspid Valve Tricuspid valve structure is normal. There is trace regurgitation. The right ventricular systolic pressure is normal. The estimated right ventricular systolic pressure is 27.00 mmHg.   Pulmonic Valve The pulmonic valve was not well visualized.   Ascending Aorta The aortic root is mildly dilated. The aortic root is 4.20 cm.   IVC/SVC The right atrial pressure is estimated at 3.0 mmHg. The inferior vena cava is normal in size. Respirophasic changes were normal.   Pericardium There is no pericardial effusion.     Left Ventricle Measurements    Function/Volumes   A4C EF 63 %         Dimensions   LVIDd 4.1 cm         LVIDS 2.7 cm         IVSd 1.1 cm         LVPWd 1.1 cm         FS 34               Left Atrium Measurements    Dimensions   LA size 2.3 cm               Tricuspid Valve Measurements    RVSP Parameters   TR Peak Kadeem 2.4 m/s         Est. RA pres 3 mmHg         Triscuspid Valve Regurgitation Peak Gradient 24 mmHg         Right Ventricular Peak  Systolic Pressure 27 mmHg               Aorta Measurements    Aortic Dimensions   Ao root 4.2 cm               IVC/SVC Measurements    IVC/SVC   Est. RA pres 3 mmHg               HOLTER  No results found for this or any previous visit.      Assessment/Plan     Assessment:    CVA due to multiple bilateral foci of acute infarct   - MRI on 4/6/2024 demonstrated multiple small foci of acute infarcts involving the bilateral frontal lobes, right parietal and occipital lobes, bilateral basal ganglia and bilateral corona radiata with questionable tiny acute infarct in left occipital lobe and left cerebellum.  No evidence of edema or mass effect, no midline shift.  - TTE was performed on 4/7/2024 which demonstrated EF 70%, mild concentric LVH, right to left shunting using saline contrast at rest and with provocation which may be related to PFO or intrapulmonary shunt.  Aortic root mildly dilated at 4.2 cm.  No definite evidence of valvular vegetations  - given patient's high burden of infection concern for septic emboli however patient also has PFO so the possibility of paradoxical embolism cannot be excluded  - Patient has been monitored on telemetry throughout her extensive hospitalization and has had no evidence of atrial fibrillation  - Neurology has been consulted and appreciate recommendations.  Recommend SATURNINO and recommend not giving aspirin as there is strong suspicion for septic emboli and risk of hemorrhagic transformation    Candidemia  - 2 sets of blood cultures collected on 3/31/2024 growing Candida albicans 2 sets of blood cultures collected on 3/31/2024 growing Candida albicans susceptible to fluconazole  - Risk factors include midline abdominal surgery, prolonged hospitalization in ICU, multiple courses of broad-spectrum antibiotics, leukopenia, recent neutropenia  - Infectious diseases on board and appreciate recommendations.  Suspect that ileostomy retraction with wound contamination is source  - TTE  demonstrated no evidence of valvular vegetations  - Ophthalmology evaluation with no evidence of endophthalmitis  - MRI brain with bilateral infarcts and possible septic emboli  - Continues on fluconazole 800 mg daily  - ID recommends SATURNINO for further evaluation due to MRI findings    Sinus tachycardia  - Telemetry demonstrates sinus tachycardia which could be secondary to pain, infection, respiratory distress   recommend maintaining euvolemia  - Continue to treat pain and infection per primary team  - recommend treating underlying etiology    Recurrent stenotrophomonas pneumonia  - Continues on minocycline 200 mg twice daily  - Treatment per primary team and ID    Severe metabolic encephalopathy  - Waxing and waning neuroexam since admission  - Most recent head CT from 4/5/2024 without acute intercranial abnormality which was obtained due to decline in mental status after initial improvement  - MRI brain 4/7/2024 demonstrated findings suggestive of embolic stroke without significant edema, mass effect or hemorrhagic transformation  - Hyperglycemia secondary to high-dose steroids requiring insulin drip could also be contributing  - May be a component of uremic encephalopathy as well as infection with fungemia  - Steroids are weaned off  - Defer to neurology and primary team for manage    Uremia  - now off CRRT   - management per primary team and Nephrology     Acute cecal volvulus post hemicolectomy, colostomy 2/20/2022 complicated by poor wound healing status post OR washout 4/4/2024 with surgically created mucous fistula and end ileostomy  - management per primary team and surgery     Esophagitis  - Status post EGD on 4/2/2024 and 4/3/2024  - EGD on 4/2/2024 demonstrated ulcerated esophageal tear 15 cm from incisors with slow oozing initially after clot clearance with cessation of hemorrhage and end of exam, blood and extensive clot in the esophagus and stomach, multiple small ulcers and trauma throughout stomach  with clean base  - Biopsies were obtained and negative for CMV and HSV  - Repeat EGD on 4/3/2024 demonstrated ulcerated mucosa in the upper third of the esophagus without evidence of hemorrhage, mild esophagitis, blood clotting in the body of the stomach and antrum.  - Patient remains on PPI    Pancytopenia  - Status post fill gastrin x 3 and intermittent platelet and PRBC transfusions  - In the setting of of B-cell lymphoma, prior chemotherapy, Rituxan therapy, sepsis  - Lamictal was also switched to Vimpat due to potential contribution to pancytopenia  - Management per primary team  - Platelets 54 today    Acute on chronic anemia  - Baseline hemoglobin approximately 7-8.  Status post 2 units PRBC 3/17/2024 after hemoglobin dropped to 5.3.    - Patient initially had significant blood loss from ostomy site on 3/28/2024 resulting in hemorrhagic shock and improved with blood transfusion.  Hemostasis was achieved after bleeding source was identified and sutured.  Patient had another large-volume danie bloody output from ostomy on 3/21/2024 and the bleeding source within the ostomy site was identified and sutured.  Patient also had intermittent vaginal bleeding.  - EGD 4/3/2024, 4/4/2024 without active bleeding.  - There is now concern for recurrent bleed as patient had acute drop in hemoglobin overnight which required another transfusion.  NG tube will be lavaged for possible upper GI source given ulcerations.  There is also concern for hemolysis and labs will be checked.  CT chest abdomen pelvis with contrast was also ordered by primary team.  - Defer management to primary team    CKD stage III  - Patient had ELIESER which was likely component of ATN, Bactrim use, GI bleeding.  Initially required CRRT however now off  - Has had 1.2 L urine output in the last 24 hours  - Creatinine today 1.02  - Nephrology is following and appreciate recommendations.  - Defer management to nephrology and primary team    B-cell lymphoma  status postchemotherapy in 2022 with Rituxan maintenance therapy currently on hold  Seizure disorder      Plan:  - Discussed SATURNINO with GI given esophagitis, friable tissue and clots noted on EGD from 4/3/2024.  GI fellow, Dr. Millard stated that patient has already significantly improved with PPI  - Appears that majority of bleeding may have been from NG trauma.  No contraindication for SATURNINO from GI perspective  - Will plan for SATURNINO tomorrow however will need to check CBC in the morning to ensure that platelets are greater than 50   - Continue diuresis to keep patient net even    Case discussed and reviewed with Dr. Figueroa and is pending their agreement of the assessment and plan.     Thank you for involving us in the care of your patient.  ==========================================================================================    ** Please Note: Fluency DirectDictation voice to text software may have been used in the creation of this document. **

## 2024-04-10 ENCOUNTER — ANESTHESIA EVENT (INPATIENT)
Dept: RADIOLOGY | Facility: HOSPITAL | Age: 59
DRG: 870 | End: 2024-04-10
Payer: COMMERCIAL

## 2024-04-10 ENCOUNTER — TELEPHONE (OUTPATIENT)
Dept: OTHER | Facility: HOSPITAL | Age: 59
End: 2024-04-10

## 2024-04-10 ENCOUNTER — ANESTHESIA (INPATIENT)
Dept: RADIOLOGY | Facility: HOSPITAL | Age: 59
DRG: 870 | End: 2024-04-10
Payer: COMMERCIAL

## 2024-04-10 LAB
25(OH)D3 SERPL-MCNC: 25.4 NG/ML (ref 30–100)
ACANTHOCYTES BLD QL SMEAR: PRESENT
ALBUMIN SERPL BCP-MCNC: 2.3 G/DL (ref 3.5–5)
ALP SERPL-CCNC: 918 U/L (ref 34–104)
ALT SERPL W P-5'-P-CCNC: 42 U/L (ref 7–52)
ANION GAP SERPL CALCULATED.3IONS-SCNC: 14 MMOL/L (ref 4–13)
ANISOCYTOSIS BLD QL SMEAR: PRESENT
ANISOCYTOSIS BLD QL SMEAR: PRESENT
APPEARANCE CSF: CLEAR
AST SERPL W P-5'-P-CCNC: 24 U/L (ref 13–39)
BACTERIA BLD CULT: ABNORMAL
BASE EX.OXY STD BLDV CALC-SCNC: 86.5 % (ref 60–80)
BASE EXCESS BLDV CALC-SCNC: -8 MMOL/L
BASOPHILS # BLD MANUAL: 0 THOUSAND/UL (ref 0–0.1)
BASOPHILS NFR MAR MANUAL: 0 % (ref 0–1)
BILIRUB SERPL-MCNC: 1.85 MG/DL (ref 0.2–1)
BUN SERPL-MCNC: 62 MG/DL (ref 5–25)
BURR CELLS BLD QL SMEAR: PRESENT
BURR CELLS BLD QL SMEAR: PRESENT
C GATTII+NEOFOR DNA CSF QL NAA+NON-PROBE: NOT DETECTED
CA-I BLD-SCNC: 1.45 MMOL/L (ref 1.12–1.32)
CALCIUM ALBUM COR SERPL-MCNC: 11.6 MG/DL (ref 8.3–10.1)
CALCIUM SERPL-MCNC: 10.2 MG/DL (ref 8.4–10.2)
CHLORIDE SERPL-SCNC: 107 MMOL/L (ref 96–108)
CMV DNA CSF QL NAA+NON-PROBE: NOT DETECTED
CO2 SERPL-SCNC: 20 MMOL/L (ref 21–32)
CREAT SERPL-MCNC: 1.13 MG/DL (ref 0.6–1.3)
DACRYOCYTES BLD QL SMEAR: PRESENT
E COLI K1 DNA CSF QL NAA+NON-PROBE: NOT DETECTED
EOSINOPHIL # BLD MANUAL: 0 THOUSAND/UL (ref 0–0.4)
EOSINOPHIL NFR BLD MANUAL: 0 % (ref 0–6)
ERYTHROCYTE [DISTWIDTH] IN BLOOD BY AUTOMATED COUNT: 18.6 % (ref 11.6–15.1)
ERYTHROCYTE [DISTWIDTH] IN BLOOD BY AUTOMATED COUNT: 18.6 % (ref 11.6–15.1)
ERYTHROCYTE [DISTWIDTH] IN BLOOD BY AUTOMATED COUNT: 19.3 % (ref 11.6–15.1)
EV RNA CSF QL NAA+NON-PROBE: NOT DETECTED
GFR SERPL CREATININE-BSD FRML MDRD: 53 ML/MIN/1.73SQ M
GIANT PLATELETS BLD QL SMEAR: PRESENT
GIANT PLATELETS BLD QL SMEAR: PRESENT
GLUCOSE CSF-MCNC: 124 MG/DL (ref 40–70)
GLUCOSE SERPL-MCNC: 166 MG/DL (ref 65–140)
GLUCOSE SERPL-MCNC: 180 MG/DL (ref 65–140)
GLUCOSE SERPL-MCNC: 202 MG/DL (ref 65–140)
GLUCOSE SERPL-MCNC: 204 MG/DL (ref 65–140)
GLUCOSE SERPL-MCNC: 248 MG/DL (ref 65–140)
GP B STREP DNA CSF QL NAA+NON-PROBE: NOT DETECTED
GRAM STN SPEC: ABNORMAL
HAEM INFLU DNA CSF QL NAA+NON-PROBE: NOT DETECTED
HAPTOGLOB SERPL-MCNC: 78 MG/DL (ref 33–346)
HCO3 BLDV-SCNC: 18.3 MMOL/L (ref 24–30)
HCT VFR BLD AUTO: 25.3 % (ref 34.8–46.1)
HCT VFR BLD AUTO: 27 % (ref 34.8–46.1)
HCT VFR BLD AUTO: 27.8 % (ref 34.8–46.1)
HELMET CELLS BLD QL SMEAR: PRESENT
HGB BLD-MCNC: 8.5 G/DL (ref 11.5–15.4)
HGB BLD-MCNC: 9 G/DL (ref 11.5–15.4)
HGB BLD-MCNC: 9.5 G/DL (ref 11.5–15.4)
HHV6 DNA CSF QL NAA+NON-PROBE: NOT DETECTED
HSV1 DNA CSF QL NAA+NON-PROBE: NOT DETECTED
HSV2 DNA CSF QL NAA+NON-PROBE: NOT DETECTED
L MONOCYTOG DNA CSF QL NAA+NON-PROBE: NOT DETECTED
LACTATE SERPL-SCNC: 0.9 MMOL/L (ref 0.5–2)
LYMPHOCYTES # BLD AUTO: 0 % (ref 14–44)
LYMPHOCYTES # BLD AUTO: 0 % (ref 14–44)
LYMPHOCYTES # BLD AUTO: 0 THOUSAND/UL (ref 0.6–4.47)
LYMPHOCYTES # BLD AUTO: 0 THOUSAND/UL (ref 0.6–4.47)
LYMPHOCYTES # BLD AUTO: 0.41 THOUSAND/UL (ref 0.6–4.47)
LYMPHOCYTES # BLD AUTO: 2 % (ref 14–44)
LYMPHOCYTES NFR CSF MANUAL: 24 %
MAGNESIUM SERPL-MCNC: 1.9 MG/DL (ref 1.9–2.7)
MCH RBC QN AUTO: 29.2 PG (ref 26.8–34.3)
MCH RBC QN AUTO: 29.3 PG (ref 26.8–34.3)
MCH RBC QN AUTO: 29.4 PG (ref 26.8–34.3)
MCHC RBC AUTO-ENTMCNC: 33.3 G/DL (ref 31.4–37.4)
MCHC RBC AUTO-ENTMCNC: 33.6 G/DL (ref 31.4–37.4)
MCHC RBC AUTO-ENTMCNC: 34.2 G/DL (ref 31.4–37.4)
MCV RBC AUTO: 86 FL (ref 82–98)
MCV RBC AUTO: 87 FL (ref 82–98)
MCV RBC AUTO: 88 FL (ref 82–98)
METAMYELOCYTES NFR BLD MANUAL: 3 % (ref 0–1)
MICROCYTES BLD QL AUTO: PRESENT
MONOCYTES # BLD AUTO: 0 THOUSAND/UL (ref 0–1.22)
MONOCYTES # BLD AUTO: 0.34 THOUSAND/UL (ref 0–1.22)
MONOCYTES # BLD AUTO: 0.41 THOUSAND/UL (ref 0–1.22)
MONOCYTES NFR BLD: 0 % (ref 4–12)
MONOCYTES NFR BLD: 2 % (ref 4–12)
MONOCYTES NFR BLD: 2 % (ref 4–12)
MONOS+MACROS CSF MANUAL: 16 %
MYELOCYTES NFR BLD MANUAL: 2 % (ref 0–1)
N MEN DNA CSF QL NAA+NON-PROBE: NOT DETECTED
NEUTROPHILS # BLD MANUAL: 16.5 THOUSAND/UL (ref 1.85–7.62)
NEUTROPHILS # BLD MANUAL: 18.57 THOUSAND/UL (ref 1.85–7.62)
NEUTROPHILS # BLD MANUAL: 19.87 THOUSAND/UL (ref 1.85–7.62)
NEUTROPHILS NFR CSF MANUAL: 60 %
NEUTS BAND NFR BLD MANUAL: 10 % (ref 0–8)
NEUTS BAND NFR BLD MANUAL: 18 % (ref 0–8)
NEUTS BAND NFR BLD MANUAL: 8 % (ref 0–8)
NEUTS SEG NFR BLD AUTO: 82 % (ref 43–75)
NEUTS SEG NFR BLD AUTO: 83 % (ref 43–75)
NEUTS SEG NFR BLD AUTO: 88 % (ref 43–75)
NRBC BLD AUTO-RTO: 12 /100 WBC (ref 0–2)
NRBC BLD AUTO-RTO: 15 /100 WBC (ref 0–2)
NRBC BLD AUTO-RTO: 19 /100 WBC (ref 0–2)
O2 CT BLDV-SCNC: 12.3 ML/DL
OVALOCYTES BLD QL SMEAR: PRESENT
OVALOCYTES BLD QL SMEAR: PRESENT
PARECHOVIRUS A RNA CSF QL NAA+NON-PROBE: NOT DETECTED
PCO2 BLDV: 40.2 MM HG (ref 42–50)
PH BLDV: 7.28 [PH] (ref 7.3–7.4)
PHOSPHATE SERPL-MCNC: 5.7 MG/DL (ref 2.7–4.5)
PLATELET # BLD AUTO: 46 THOUSANDS/UL (ref 149–390)
PLATELET # BLD AUTO: 82 THOUSANDS/UL (ref 149–390)
PLATELET # BLD AUTO: 93 THOUSANDS/UL (ref 149–390)
PLATELET BLD QL SMEAR: ABNORMAL
PMV BLD AUTO: 12.2 FL (ref 8.9–12.7)
PMV BLD AUTO: 12.3 FL (ref 8.9–12.7)
PO2 BLDV: 56.1 MM HG (ref 35–45)
POIKILOCYTOSIS BLD QL SMEAR: PRESENT
POLYCHROMASIA BLD QL SMEAR: PRESENT
POTASSIUM SERPL-SCNC: 3.7 MMOL/L (ref 3.5–5.3)
PROT CSF-MCNC: 59 MG/DL (ref 15–45)
PROT SERPL-MCNC: 4.2 G/DL (ref 6.4–8.4)
PTH-INTACT SERPL-MCNC: 27.4 PG/ML (ref 12–88)
RBC # BLD AUTO: 2.91 MILLION/UL (ref 3.81–5.12)
RBC # BLD AUTO: 3.07 MILLION/UL (ref 3.81–5.12)
RBC # BLD AUTO: 3.23 MILLION/UL (ref 3.81–5.12)
RBC MORPH BLD: PRESENT
S PNEUM DNA CSF QL NAA+NON-PROBE: NOT DETECTED
SODIUM SERPL-SCNC: 141 MMOL/L (ref 135–147)
TARGETS BLD QL SMEAR: PRESENT
TOTAL CELLS COUNTED BLD: YES
TOTAL CELLS COUNTED SPEC: 100
TUBE # CSF: 4
VZV DNA CSF QL NAA+NON-PROBE: NOT DETECTED
WBC # BLD AUTO: 16.84 THOUSAND/UL (ref 4.31–10.16)
WBC # BLD AUTO: 19.87 THOUSAND/UL (ref 4.31–10.16)
WBC # BLD AUTO: 20.41 THOUSAND/UL (ref 4.31–10.16)
WBC # CSF AUTO: 1 /UL (ref 0–5)
WBC TOXIC VACUOLES BLD QL SMEAR: PRESENT
WBC TOXIC VACUOLES BLD QL SMEAR: PRESENT

## 2024-04-10 PROCEDURE — 009U3ZZ DRAINAGE OF SPINAL CANAL, PERCUTANEOUS APPROACH: ICD-10-PCS | Performed by: RADIOLOGY

## 2024-04-10 PROCEDURE — 83605 ASSAY OF LACTIC ACID: CPT | Performed by: INTERNAL MEDICINE

## 2024-04-10 PROCEDURE — 87106 FUNGI IDENTIFICATION YEAST: CPT | Performed by: RADIOLOGY

## 2024-04-10 PROCEDURE — 87102 FUNGUS ISOLATION CULTURE: CPT | Performed by: RADIOLOGY

## 2024-04-10 PROCEDURE — 97605 NEG PRS WND THER DME<=50SQCM: CPT | Performed by: SURGERY

## 2024-04-10 PROCEDURE — 82805 BLOOD GASES W/O2 SATURATION: CPT | Performed by: INTERNAL MEDICINE

## 2024-04-10 PROCEDURE — 87205 SMEAR GRAM STAIN: CPT | Performed by: RADIOLOGY

## 2024-04-10 PROCEDURE — 94003 VENT MGMT INPAT SUBQ DAY: CPT

## 2024-04-10 PROCEDURE — 80053 COMPREHEN METABOLIC PANEL: CPT

## 2024-04-10 PROCEDURE — 85007 BL SMEAR W/DIFF WBC COUNT: CPT

## 2024-04-10 PROCEDURE — 0W9J30Z DRAINAGE OF PELVIC CAVITY WITH DRAINAGE DEVICE, PERCUTANEOUS APPROACH: ICD-10-PCS | Performed by: RADIOLOGY

## 2024-04-10 PROCEDURE — 89051 BODY FLUID CELL COUNT: CPT

## 2024-04-10 PROCEDURE — 49406 IMAGE CATH FLUID PERI/RETRO: CPT | Performed by: RADIOLOGY

## 2024-04-10 PROCEDURE — NC001 PR NO CHARGE: Performed by: PHYSICIAN ASSISTANT

## 2024-04-10 PROCEDURE — 99233 SBSQ HOSP IP/OBS HIGH 50: CPT | Performed by: INTERNAL MEDICINE

## 2024-04-10 PROCEDURE — 87040 BLOOD CULTURE FOR BACTERIA: CPT | Performed by: STUDENT IN AN ORGANIZED HEALTH CARE EDUCATION/TRAINING PROGRAM

## 2024-04-10 PROCEDURE — 82330 ASSAY OF CALCIUM: CPT | Performed by: INTERNAL MEDICINE

## 2024-04-10 PROCEDURE — 86255 FLUORESCENT ANTIBODY SCREEN: CPT | Performed by: EMERGENCY MEDICINE

## 2024-04-10 PROCEDURE — P9037 PLATE PHERES LEUKOREDU IRRAD: HCPCS

## 2024-04-10 PROCEDURE — NC001 PR NO CHARGE: Performed by: RADIOLOGY

## 2024-04-10 PROCEDURE — 87070 CULTURE OTHR SPECIMN AEROBIC: CPT

## 2024-04-10 PROCEDURE — 87106 FUNGI IDENTIFICATION YEAST: CPT | Performed by: STUDENT IN AN ORGANIZED HEALTH CARE EDUCATION/TRAINING PROGRAM

## 2024-04-10 PROCEDURE — 82397 CHEMILUMINESCENT ASSAY: CPT

## 2024-04-10 PROCEDURE — 87075 CULTR BACTERIA EXCEPT BLOOD: CPT | Performed by: RADIOLOGY

## 2024-04-10 PROCEDURE — 86341 ISLET CELL ANTIBODY: CPT | Performed by: EMERGENCY MEDICINE

## 2024-04-10 PROCEDURE — 88185 FLOWCYTOMETRY/TC ADD-ON: CPT | Performed by: RADIOLOGY

## 2024-04-10 PROCEDURE — 84100 ASSAY OF PHOSPHORUS: CPT

## 2024-04-10 PROCEDURE — 85027 COMPLETE CBC AUTOMATED: CPT

## 2024-04-10 PROCEDURE — 87102 FUNGUS ISOLATION CULTURE: CPT

## 2024-04-10 PROCEDURE — 87070 CULTURE OTHR SPECIMN AEROBIC: CPT | Performed by: RADIOLOGY

## 2024-04-10 PROCEDURE — 94640 AIRWAY INHALATION TREATMENT: CPT

## 2024-04-10 PROCEDURE — 83735 ASSAY OF MAGNESIUM: CPT

## 2024-04-10 PROCEDURE — 82948 REAGENT STRIP/BLOOD GLUCOSE: CPT

## 2024-04-10 PROCEDURE — 94760 N-INVAS EAR/PLS OXIMETRY 1: CPT

## 2024-04-10 PROCEDURE — 83970 ASSAY OF PARATHORMONE: CPT

## 2024-04-10 PROCEDURE — 82945 GLUCOSE OTHER FLUID: CPT

## 2024-04-10 PROCEDURE — 82652 VIT D 1 25-DIHYDROXY: CPT

## 2024-04-10 PROCEDURE — 97605 NEG PRS WND THER DME<=50SQCM: CPT | Performed by: PHYSICIAN ASSISTANT

## 2024-04-10 PROCEDURE — 87483 CNS DNA AMP PROBE TYPE 12-25: CPT | Performed by: EMERGENCY MEDICINE

## 2024-04-10 PROCEDURE — 87116 MYCOBACTERIA CULTURE: CPT

## 2024-04-10 PROCEDURE — 99232 SBSQ HOSP IP/OBS MODERATE 35: CPT | Performed by: INTERNAL MEDICINE

## 2024-04-10 PROCEDURE — 62328 DX LMBR SPI PNXR W/FLUOR/CT: CPT | Performed by: RADIOLOGY

## 2024-04-10 PROCEDURE — 84157 ASSAY OF PROTEIN OTHER: CPT

## 2024-04-10 PROCEDURE — 99233 SBSQ HOSP IP/OBS HIGH 50: CPT | Performed by: STUDENT IN AN ORGANIZED HEALTH CARE EDUCATION/TRAINING PROGRAM

## 2024-04-10 PROCEDURE — 99291 CRITICAL CARE FIRST HOUR: CPT | Performed by: INTERNAL MEDICINE

## 2024-04-10 PROCEDURE — 87206 SMEAR FLUORESCENT/ACID STAI: CPT

## 2024-04-10 PROCEDURE — 88184 FLOWCYTOMETRY/ TC 1 MARKER: CPT | Performed by: RADIOLOGY

## 2024-04-10 PROCEDURE — 82306 VITAMIN D 25 HYDROXY: CPT

## 2024-04-10 RX ORDER — POTASSIUM CHLORIDE 20MEQ/15ML
40 LIQUID (ML) ORAL ONCE
Status: COMPLETED | OUTPATIENT
Start: 2024-04-10 | End: 2024-04-10

## 2024-04-10 RX ORDER — FUROSEMIDE 10 MG/ML
40 INJECTION INTRAMUSCULAR; INTRAVENOUS ONCE
Status: COMPLETED | OUTPATIENT
Start: 2024-04-10 | End: 2024-04-10

## 2024-04-10 RX ORDER — FUROSEMIDE 10 MG/ML
40 INJECTION INTRAMUSCULAR; INTRAVENOUS ONCE
Status: DISCONTINUED | OUTPATIENT
Start: 2024-04-10 | End: 2024-04-10

## 2024-04-10 RX ORDER — ROCURONIUM BROMIDE 10 MG/ML
INJECTION, SOLUTION INTRAVENOUS AS NEEDED
Status: DISCONTINUED | OUTPATIENT
Start: 2024-04-10 | End: 2024-04-10

## 2024-04-10 RX ORDER — MAGNESIUM SULFATE HEPTAHYDRATE 40 MG/ML
2 INJECTION, SOLUTION INTRAVENOUS ONCE
Status: COMPLETED | OUTPATIENT
Start: 2024-04-10 | End: 2024-04-10

## 2024-04-10 RX ORDER — SODIUM CHLORIDE 9 MG/ML
INJECTION, SOLUTION INTRAVENOUS CONTINUOUS PRN
Status: DISCONTINUED | OUTPATIENT
Start: 2024-04-10 | End: 2024-04-10

## 2024-04-10 RX ORDER — SODIUM CHLORIDE 9 MG/ML
5 INJECTION, SOLUTION INTRAMUSCULAR; INTRAVENOUS; SUBCUTANEOUS DAILY
Qty: 300 ML | Refills: 3 | Status: SHIPPED | OUTPATIENT
Start: 2024-04-10 | End: 2024-04-19

## 2024-04-10 RX ADMIN — NYSTATIN 1 APPLICATION: 100000 POWDER TOPICAL at 17:55

## 2024-04-10 RX ADMIN — PIPERACILLIN SODIUM AND TAZOBACTAM SODIUM 4.5 G: 36; 4.5 INJECTION, POWDER, LYOPHILIZED, FOR SOLUTION INTRAVENOUS at 13:00

## 2024-04-10 RX ADMIN — INSULIN LISPRO 1 UNITS: 100 INJECTION, SOLUTION INTRAVENOUS; SUBCUTANEOUS at 05:55

## 2024-04-10 RX ADMIN — CHLORHEXIDINE GLUCONATE 0.12% ORAL RINSE 15 ML: 1.2 LIQUID ORAL at 21:40

## 2024-04-10 RX ADMIN — Medication 2 SPRAY: at 08:28

## 2024-04-10 RX ADMIN — Medication 800 MG: at 08:31

## 2024-04-10 RX ADMIN — SODIUM CHLORIDE: 0.9 INJECTION, SOLUTION INTRAVENOUS at 19:05

## 2024-04-10 RX ADMIN — NOREPINEPHRINE BITARTRATE 5 MCG/MIN: 1 INJECTION, SOLUTION, CONCENTRATE INTRAVENOUS at 19:35

## 2024-04-10 RX ADMIN — PIPERACILLIN SODIUM AND TAZOBACTAM SODIUM 4.5 G: 36; 4.5 INJECTION, POWDER, LYOPHILIZED, FOR SOLUTION INTRAVENOUS at 15:31

## 2024-04-10 RX ADMIN — MINOCYCLINE HYDROCHLORIDE 200 MG: 50 TABLET, FILM COATED ORAL at 08:27

## 2024-04-10 RX ADMIN — SULFAMETHOXAZOLE AND TRIMETHOPRIM 1 TABLET: 800; 160 TABLET ORAL at 08:28

## 2024-04-10 RX ADMIN — MINOCYCLINE HYDROCHLORIDE 200 MG: 50 TABLET, FILM COATED ORAL at 21:41

## 2024-04-10 RX ADMIN — INSULIN LISPRO 3 UNITS: 100 INJECTION, SOLUTION INTRAVENOUS; SUBCUTANEOUS at 13:00

## 2024-04-10 RX ADMIN — FUROSEMIDE 40 MG: 10 INJECTION, SOLUTION INTRAMUSCULAR; INTRAVENOUS at 15:35

## 2024-04-10 RX ADMIN — Medication 20 MG: at 08:27

## 2024-04-10 RX ADMIN — Medication 12.5 MG: at 08:26

## 2024-04-10 RX ADMIN — SUGAMMADEX 200 MG: 100 INJECTION, SOLUTION INTRAVENOUS at 20:50

## 2024-04-10 RX ADMIN — NYSTATIN 1 APPLICATION: 100000 POWDER TOPICAL at 08:27

## 2024-04-10 RX ADMIN — POTASSIUM CHLORIDE 40 MEQ: 1.5 SOLUTION ORAL at 08:00

## 2024-04-10 RX ADMIN — LACOSAMIDE 100 MG: 100 TABLET, FILM COATED ORAL at 08:26

## 2024-04-10 RX ADMIN — ROCURONIUM BROMIDE 20 MG: 10 INJECTION, SOLUTION INTRAVENOUS at 19:25

## 2024-04-10 RX ADMIN — MAGNESIUM SULFATE HEPTAHYDRATE 2 G: 40 INJECTION, SOLUTION INTRAVENOUS at 08:00

## 2024-04-10 RX ADMIN — PHENYLEPHRINE HYDROCHLORIDE 30 MCG/MIN: 10 INJECTION INTRAVENOUS at 19:24

## 2024-04-10 RX ADMIN — SODIUM CHLORIDE SOLN NEBU 3% 4 ML: 3 NEBU SOLN at 13:34

## 2024-04-10 RX ADMIN — Medication 2 SPRAY: at 21:40

## 2024-04-10 RX ADMIN — POLYETHYLENE GLYCOL 3350 17 G: 17 POWDER, FOR SOLUTION ORAL at 08:26

## 2024-04-10 RX ADMIN — SODIUM CHLORIDE SOLN NEBU 3% 4 ML: 3 NEBU SOLN at 08:44

## 2024-04-10 RX ADMIN — ROCURONIUM BROMIDE 30 MG: 10 INJECTION, SOLUTION INTRAVENOUS at 19:15

## 2024-04-10 RX ADMIN — LACOSAMIDE 100 MG: 100 TABLET, FILM COATED ORAL at 21:40

## 2024-04-10 RX ADMIN — CHLORHEXIDINE GLUCONATE 0.12% ORAL RINSE 15 ML: 1.2 LIQUID ORAL at 08:26

## 2024-04-10 RX ADMIN — INSULIN LISPRO 2 UNITS: 100 INJECTION, SOLUTION INTRAVENOUS; SUBCUTANEOUS at 17:55

## 2024-04-10 NOTE — TELEMEDICINE
e-Consult ([facility])  - Interventional Radiology  Carla Hunt 59 y.o. female MRN: [id number]  [facility]: [id number]          Interventional Radiology has been consulted to evaluate Carla Hunt    We were consulted by surgery concerning this patient with SIRS and pelvic fluid collection.  Patient has had a prolonged hospital course and is well-known to IR.  Previously consulted during hospital admission.  Patient had a recent CT of the abdomen and pelvis yesterday which reveals a pelvic fluid collection this may possibly explain the patient's fever and leukocytosis/developing SIRS.  IR has been asked to evaluate the patient for possible pelvic fluid drainage.  Of note the patient has a vac on an open abdominal wound.    Inpatient Consult to IR  Consult performed by: Tam Nielsen PA-C  Consult ordered by: Torrie Flowers PA-C        04/10/24    Assessment/Recommendation:     Pelvic Fluid Collection/SIRS  -Plan for pelvic fluid aspiration and possible drainage pending IR scheduling  -Case was reviewed with Lionel REES of surgery, patient reported to be safe to lay prone for procedure  - NPO  - Goal INR <1.5, plt >50  - will require anesthesia coordination, complex positioning, tracheostomy on vent, open abdominal wound with vac  - case discussed with Dr. Betancur of IR        11-20 minutes, >50% of the total time devoted to medical consultative verbal/EMR discussion between providers. Written report will be generated in the EMR.     Thank you for allowing Interventional Radiology to participate in the care of Carla Hunt. Please don't hesitate to call or TigerText us with any questions.     Tam Nielsen PA-C

## 2024-04-10 NOTE — PLAN OF CARE
Problem: Prexisting or High Potential for Compromised Skin Integrity  Goal: Skin integrity is maintained or improved  Description: INTERVENTIONS:  - Identify patients at risk for skin breakdown  - Assess and monitor skin integrity  - Assess and monitor nutrition and hydration status  - Monitor labs   - Assess for incontinence   - Turn and reposition patient  - Assist with mobility/ambulation  - Relieve pressure over bony prominences  - Avoid friction and shearing  - Provide appropriate hygiene as needed including keeping skin clean and dry  - Evaluate need for skin moisturizer/barrier cream  - Collaborate with interdisciplinary team   - Patient/family teaching  - Consider wound care consult   Outcome: Progressing     Problem: Nutrition/Hydration-ADULT  Goal: Nutrient/Hydration intake appropriate for improving, restoring or maintaining nutritional needs  Description: Monitor and assess patient's nutrition/hydration status for malnutrition. Collaborate with interdisciplinary team and initiate plan and interventions as ordered.  Monitor patient's weight and dietary intake as ordered or per policy. Utilize nutrition screening tool and intervene as necessary. Determine patient's food preferences and provide high-protein, high-caloric foods as appropriate.     INTERVENTIONS:  - Monitor oral intake, urinary output, labs, and treatment plans  - Assess nutrition and hydration status and recommend course of action  - Evaluate amount of meals eaten  - Assist patient with eating if necessary   - Allow adequate time for meals  - Recommend/ encourage appropriate diets, oral nutritional supplements, and vitamin/mineral supplements  - Order, calculate, and assess calorie counts as needed  - Recommend, monitor, and adjust tube feedings and TPN/PPN based on assessed needs  - Assess need for intravenous fluids  - Provide specific nutrition/hydration education as appropriate  - Include patient/family/caregiver in decisions related to  nutrition  Outcome: Progressing     Problem: INFECTION - ADULT  Goal: Absence or prevention of progression during hospitalization  Description: INTERVENTIONS:  - Assess and monitor for signs and symptoms of infection  - Monitor lab/diagnostic results  - Monitor all insertion sites, i.e. indwelling lines, tubes, and drains  - Monitor endotracheal if appropriate and nasal secretions for changes in amount and color  - Arnold appropriate cooling/warming therapies per order  - Administer medications as ordered  - Instruct and encourage patient and family to use good hand hygiene technique  - Identify and instruct in appropriate isolation precautions for identified infection/condition  Outcome: Progressing     Problem: SAFETY ADULT  Goal: Patient will remain free of falls  Description: INTERVENTIONS:  - Educate patient/family on patient safety including physical limitations  - Instruct patient to call for assistance with activity   - Consult OT/PT to assist with strengthening/mobility   - Keep Call bell within reach  - Keep bed low and locked with side rails adjusted as appropriate  - Keep care items and personal belongings within reach  - Initiate and maintain comfort rounds  - Make Fall Risk Sign visible to staff  - Offer Toileting every 2 Hours, in advance of need  - Initiate/Maintain bed alarm  - Obtain necessary fall risk management equipment: non skid footwear  - Apply yellow socks and bracelet for high fall risk patients  - Consider moving patient to room near nurses station  Outcome: Progressing     Problem: RESPIRATORY - ADULT  Goal: Achieves optimal ventilation and oxygenation  Description: INTERVENTIONS:  - Assess for changes in respiratory status  - Assess for changes in mentation and behavior  - Position to facilitate oxygenation and minimize respiratory effort  - Oxygen administered by appropriate delivery if ordered  - Initiate smoking cessation education as indicated  - Encourage broncho-pulmonary  hygiene including cough, deep breathe, Incentive Spirometry  - Assess the need for suctioning and aspirate as needed  - Assess and instruct to report SOB or any respiratory difficulty  - Respiratory Therapy support as indicated  Outcome: Progressing     Problem: SKIN/TISSUE INTEGRITY - ADULT  Goal: Skin Integrity remains intact(Skin Breakdown Prevention)  Description: Assess:  -Perform Flavio assessment every shift   -Clean and moisturize skin every day   -Inspect skin when repositioning, toileting, and assisting with ADLS  -Assess under medical devices such as ronny  every hour   -Assess extremities for adequate circulation and sensation     Bed Management:  -Have minimal linens on bed & keep smooth, unwrinkled  -Change linens as needed when moist or perspiring  -Avoid sitting or lying in one position for more than 2 hours while in bed  -Keep HOB at 45 degrees     Toileting:  -Offer bedside commode  -Assess for incontinence every hour  -Use incontinent care products after each incontinent episode such as moisture barrier cream     Activity:  -Mobilize patient 3 times a day  -Encourage activity and walks on unit  -Encourage or provide ROM exercises   -Turn and reposition patient every 2 Hours  -Use appropriate equipment to lift or move patient in bed  -Instruct/ Assist with weight shifting every hour when out of bed in chair  -Consider limitation of chair time 2 hour intervals    Skin Care:  -Avoid use of baby powder, tape, friction and shearing, hot water or constrictive clothing  -Relieve pressure over bony prominences using allevyn   -Do not massage red bony areas    Next Steps:  -Teach patient strategies to minimize risks such as weight shifting    -Consider consults to  interdisciplinary teams such as PT  Outcome: Progressing  Goal: Incision(s), wounds(s) or drain site(s) healing without S/S of infection  Description: INTERVENTIONS  - Assess and document dressing, incision, wound bed, drain sites and  surrounding tissue  - Provide patient and family education  Outcome: Progressing  Goal: Pressure injury heals and does not worsen  Description: Interventions:  - Implement low air loss mattress or specialty surface (Criteria met)  - Apply silicone foam dressing  - Apply fecal or urinary incontinence containment device   - Utilize friction reducing device or surface for transfers   - Consider nutrition services referral as needed  Outcome: Progressing     Problem: NEUROSENSORY - ADULT  Goal: Achieves stable or improved neurological status  Description: INTERVENTIONS  - Monitor and report changes in neurological status  - Monitor vital signs such as temperature, blood pressure, glucose, and any other labs ordered   - Initiate measures to prevent increased intracranial pressure  - Monitor for seizure activity and implement precautions if appropriate      Outcome: Not Progressing  Goal: Achieves maximal functionality and self care  Description: INTERVENTIONS  - Monitor swallowing and airway patency with patient fatigue and changes in neurological status  - Encourage and assist patient to increase activity and self care.   - Encourage visually impaired, hearing impaired and aphasic patients to use assistive/communication devices  Outcome: Not Progressing     Problem: CARDIOVASCULAR - ADULT  Goal: Maintains optimal cardiac output and hemodynamic stability  Description: INTERVENTIONS:  - Monitor I/O, vital signs and rhythm  - Monitor for S/S and trends of decreased cardiac output  - Administer and titrate ordered vasoactive medications to optimize hemodynamic stability  - Assess quality of pulses, skin color and temperature  - Assess for signs of decreased coronary artery perfusion  - Instruct patient to report change in severity of symptoms  Outcome: Progressing  Goal: Absence of cardiac dysrhythmias or at baseline rhythm  Description: INTERVENTIONS:  - Continuous cardiac monitoring, vital signs, obtain 12 lead EKG if  ordered  - Administer antiarrhythmic and heart rate control medications as ordered  - Monitor electrolytes and administer replacement therapy as ordered  Outcome: Progressing     Problem: GASTROINTESTINAL - ADULT  Goal: Minimal or absence of nausea and/or vomiting  Description: INTERVENTIONS:  - Administer IV fluids if ordered to ensure adequate hydration  - Maintain NPO status until nausea and vomiting are resolved  - Nasogastric tube if ordered  - Administer ordered antiemetic medications as needed  - Provide nonpharmacologic comfort measures as appropriate  - Advance diet as tolerated, if ordered  - Consider nutrition services referral to assist patient with adequate nutrition and appropriate food choices  Outcome: Progressing  Goal: Maintains or returns to baseline bowel function  Description: INTERVENTIONS:  - Assess bowel function  - Encourage oral fluids to ensure adequate hydration  - Administer IV fluids if ordered to ensure adequate hydration  - Administer ordered medications as needed  - Encourage mobilization and activity  - Consider nutritional services referral to assist patient with adequate nutrition and appropriate food choices  Outcome: Progressing  Goal: Maintains adequate nutritional intake  Description: INTERVENTIONS:  - Monitor percentage of each meal consumed  - Identify factors contributing to decreased intake, treat as appropriate  - Assist with meals as needed  - Monitor I&O, weight, and lab values if indicated  - Obtain nutrition services referral as needed  Outcome: Progressing  Goal: Establish and maintain optimal ostomy function  Description: INTERVENTIONS:  - Assess bowel function  - Encourage oral fluids to ensure adequate hydration  - Administer IV fluids if ordered to ensure adequate hydration   - Administer ordered medications as needed  - Encourage mobilization and activity  - Nutrition services referral to assist patient with appropriate food choices  - Assess stoma site  -  Consider wound care consult   Outcome: Progressing  Goal: Oral mucous membranes remain intact  Description: INTERVENTIONS  - Assess oral mucosa and hygiene practices  - Implement preventative oral hygiene regimen  - Implement oral medicated treatments as ordered  - Initiate Nutrition services referral as needed  Outcome: Progressing     Problem: GENITOURINARY - ADULT  Goal: Maintains or returns to baseline urinary function  Description: INTERVENTIONS:  - Assess urinary function  - Encourage oral fluids to ensure adequate hydration if ordered  - Administer IV fluids as ordered to ensure adequate hydration  - Administer ordered medications as needed  - Offer frequent toileting  - Follow urinary retention protocol if ordered  Outcome: Progressing  Goal: Urinary catheter remains patent  Description: INTERVENTIONS:  - Assess patency of urinary catheter  - If patient has a chronic marie, consider changing catheter if non-functioning  - Follow guidelines for intermittent irrigation of non-functioning urinary catheter  Outcome: Progressing     Problem: METABOLIC, FLUID AND ELECTROLYTES - ADULT  Goal: Electrolytes maintained within normal limits  Description: INTERVENTIONS:  - Monitor labs and assess patient for signs and symptoms of electrolyte imbalances  - Administer electrolyte replacement as ordered  - Monitor response to electrolyte replacements, including repeat lab results as appropriate  - Instruct patient on fluid and nutrition as appropriate  Outcome: Progressing  Goal: Fluid balance maintained  Description: INTERVENTIONS:  - Monitor labs   - Monitor I/O and WT  - Instruct patient on fluid and nutrition as appropriate  - Assess for signs & symptoms of volume excess or deficit  Outcome: Progressing  Goal: Glucose maintained within target range  Description: INTERVENTIONS:  - Monitor Blood Glucose as ordered  - Assess for signs and symptoms of hyperglycemia and hypoglycemia  - Administer ordered medications to  maintain glucose within target range  - Assess nutritional intake and initiate nutrition service referral as needed  Outcome: Progressing     Problem: HEMATOLOGIC - ADULT  Goal: Maintains hematologic stability  Description: INTERVENTIONS  - Assess for signs and symptoms of bleeding or hemorrhage  - Monitor labs  - Administer supportive blood products/factors as ordered and appropriate  Outcome: Progressing     Problem: MUSCULOSKELETAL - ADULT  Goal: Maintain or return mobility to safest level of function  Description: INTERVENTIONS:  - Assess patient's ability to carry out ADLs; assess patient's baseline for ADL function and identify physical deficits which impact ability to perform ADLs (bathing, care of mouth/teeth, toileting, grooming, dressing, etc.)  - Assess/evaluate cause of self-care deficits   - Assess range of motion  - Assess patient's mobility  - Assess patient's need for assistive devices and provide as appropriate  - Encourage maximum independence but intervene and supervise when necessary  - Involve family in performance of ADLs  - Assess for home care needs following discharge   - Consider OT consult to assist with ADL evaluation and planning for discharge  - Provide patient education as appropriate  Outcome: Progressing     Problem: COPING  Goal: Pt/Family able to verbalize concerns and demonstrate effective coping strategies  Description: INTERVENTIONS:  - Assist patient/family to identify coping skills, available support systems and cultural and spiritual values  - Provide emotional support, including active listening and acknowledgement of concerns of patient and caregivers  - Reduce environmental stimuli, as able  - Provide patient education  - Assess for spiritual pain/suffering and initiate spiritual care, including notification of Pastoral Care or nat[place] based community as needed  - Assess effectiveness of coping strategies  Outcome: Progressing  Goal: Will report anxiety at manageable  levels  Description: INTERVENTIONS:  - Administer medication as ordered  - Teach and encourage coping skills  - Provide emotional support  - Assess patient/family for anxiety and ability to cope  Outcome: Progressing     Problem: BEHAVIOR  Goal: Pt/Family maintain appropriate behavior and adhere to behavioral management agreement, if implemented  Description: INTERVENTIONS:  - Assess the family dynamic   - Encourage verbalization of thoughts and concerns in a socially appropriate manner  - Assess patient/family's coping skills and non-compliant behavior (including use of illegal substances).  - Utilize positive, consistent limit setting strategies supporting safety of patient, staff and others  - Initiate consult with Case Management, Spiritual Care or other ancillary services as appropriate  - If a patient's/visitor's behavior jeopardizes the safety of the patient, staff, or others, refer to organization procedure.   - Notify Security of behavior or suspected illegal substances which indicate the need for search of the patient and/or belongings  - Encourage participation in the decision making process about a behavioral management agreement; implement if patient meets criteria  Outcome: Progressing     Problem: Potential for Falls  Goal: Patient will remain free of falls  Description: INTERVENTIONS:  - Educate patient/family on patient safety including physical limitations  - Instruct patient to call for assistance with activity   - Consult OT/PT to assist with strengthening/mobility   - Keep Call bell within reach  - Keep bed low and locked with side rails adjusted as appropriate  - Keep care items and personal belongings within reach  - Initiate and maintain comfort rounds  - Make Fall Risk Sign visible to staff  - Offer Toileting every 2 Hours, in advance of need  - Initiate/Maintain bed alarm  - Obtain necessary fall risk management equipment: non skid footwear  - Apply yellow socks and bracelet for high fall  risk patients  - Consider moving patient to room near nurses station  Outcome: Progressing

## 2024-04-10 NOTE — ASSESSMENT & PLAN NOTE
Admitted, severe COVID course complicated by incidences of pneumonia  Patient was re-intubated 3/11 with subsequent bedside trach on 3/12.   Was previously on high dose steroids, now weaned.   Patient back on ventilator support, previously on trach collar and using PMV at her best.

## 2024-04-10 NOTE — PROGRESS NOTES
Progress Note - Infectious Disease   Carla Hunt 59 y.o. female MRN: [id number]  Unit/Bed#: MICU 10 Encounter: [id number]      Impression/Plan:    1. Sepsis, recurrent since admission.  Due to COVID-19 infection, recurrent pneumonias, cecal volvulus status post surgery and wound dehiscence, now with candidemia, fecal contamination of midline wound, multiple embolic strokes, possible intra-abdominal abscess versus hemoperitoneum.  Patient with fever, now persistent bandemia.   -Continue high-dose fluconazole   -restart IV Zosyn, continuous infusion dosing by pharmacy   -continue minocycline  -Follow temperatures closely  -Recheck CBC diff in AM to monitor infection  -Supportive care as per the primary service  -Consulted for drainage of pelvic fluid collection, please send for culture    2. Acute hypoxic respiratory failure, likely multifactorial, with both COVID and bacterial pneumonia contributing.  Also consider persistent inflammation, fibrosis as seems quite steroid responsive in past.  Most recently extubated 3/1.  Patient with worsening respiratory status requiring intubation again 3/11.  Suspect ongoing hypoxia related to persistent COVID-pneumonia, superimposed bacterial infection, inflammatory component/lung fibrosis related to COVID, now with profound deconditioning and muscle weakness.  Status post bronchoscopy and trach 3/12 with excessive secretions seen from the lower lobes bilaterally.  BAL culture from 3/11 shows heavy growth of Stenotrophomonas maltophilia, Candida albicans.  PJP DFA negative.  Fungitell was positive but patient was on beta-lactam antibiotics and had received multiple blood transfusions.  She was clinically improving and monitor off antifungals. Serum cryptococcal Ag negative. Status post 10 days of vancomycin and cefepime, 14-day course of remdesivir/Paxlovid 3/15, 14-day course of antibiotics with minocycline for stenotrophomonas pneumonia. Status post repeat bronchoscopy  3/17 for airway clearance with continued thick secretions.   Histoplasma urine antigen negative.  Patient was clinically improving and weaned to trach collar but 3/30 had worsening lethargy, again placed on the ventilator 3/31. Antibiotics restarted.  Sputum culture is now growing stenotrophomonas maltophilia and mixed respiratory lauren. Repeat galactomannan negative on 4/2. Repeat CT stable. Was on high dose steroids nearly 3 months, now weaned off   -Continue minocycline 200 mg twice daily.  Tube feeds should be held for 1 hour prior to and 1 hour after minocycline administration. E test requested, minocycline is sensitive. Treat x 10 days, through 4/11/24  -Antifungals as below    3. Candidemia.  2 sets of blood cultures collected 3/31 are growing Candida albicans, susceptible to fluconazole.  The patient has numerous risk factors for candidemia including presence of midline, abdominal surgery, prolonged hospitalization in the ICU, multiple courses of broad-spectrum antibiotics, leukopenia/recent neutropenia.  Suspect ileostomy retraction with wound contamination is the source.  TTE no vegetations.  Status post ophthalmology evaluation, no evidence of endophthalmitis.  MRI brain now shows bilateral infarcts, consideration for possible septic emboli. Blood cultures from 4/4 and 4/6 also growing candida albicans in 1 of 2 sets. Persistent bacteremia raising concern for endovascular source of infection   -Continue fluconazole 800mg daily    -if LFTs continue to worsen will switch to Micafungin   -Follow-up repeat blood cultures   -Recommend SATURNINO for further evaluation due to persistent fungemia and MRI findings, scheduled today    4.  Bilateral acute infarcts on MRI brain.  Suggesting embolic etiology.  Consideration for endocarditis in setting of fungemia.  May also be related to paradoxical embolism from right lower extremity DVT and PFO.  Per neurology, low concern this is causing her persistent encephalopathy.   Repeat MRI shows new infarcts in the left inferior parietal lobe with small area of acute hemorrhage noted.  IVC filter placed 4/9   -Neurology following   -Follow-up SATURNINO    5. Recurrent bacterial pneumonia.  The patient has completed multiple treatment courses over the hospitalization. Prior BAL cultures with Pseudomonas and stenotrophomonas.  Unclear if pathogens or colonizers.  Status post 10 days levofloxacin.  CT C/A/P showed evolving changes likely all due to sequelae of COVID, infections.  Noted to have worsening hypoxia and purulent secretions on bronchoscopy 3/11.  BAL culture with heavy growth of Stenotrophomonas maltophilia, Candida.  Completed 14-day course of minocycline 3/27.  CT chest now shows new right upper lobe infiltrate and sputum culture is again growing Stenotrophomonas maltophilia.  Repeat CT chest 4/5 with improving but persistent groundglass and consolidative opacities.              -Antibiotics as above              -Wean O2 as able     6. Severe COVID, present on admission.  Patient was treated with a 10-day course of remdesivir, dexamethasone and was given 1 dose of Tocilizumab on admission.  COVID antigen was negative prior to coming off isolation.  Had positive COVID PCR from BAL 2/16, status post another 5-day course of remdesivir.  Patient has remained on high-dose systemic corticosteroid throughout her hospitalization.  COVID antigen positive and PCR is also positive 3/3 and Ct 26.8, consistent with high viral replication.  Repeat PCR positive with Ct closer to 30. Status post 14-day course of remdesivir/Paxlovid 3/15/2024.  Rapid COVID antigen negative 3/25 and 3/27  -Steroid wean per ICU  -No additional antivirals indicated  - Bactrim for PJP prophylaxis, can likely discontinue later this week     7. 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  Patient is status post exploratory laparotomy with ileocecectomy and end ileostomy creation 2/20.   End ileostomy is with  ongoing ischemic changes which is likely contributing to the imaging findings and ongoing SIRS response.  Now with wound dehiscence status post staple removal, status post wound VAC placement 3/13, removed but replaced due to bleeding.  Now with retraction of ileostomy and fecal contamination through midline wound.  Status post washout 4/4.  Repeat CT now shows small volume hemoperitoneum in the pelvis, small anterior abdominal wall hematoma.  Per surgery, concern for abscess  -IR consulted for aspiration  -Surgery follow-up   -Continue Zosyn     8. B-cell lymphoma, on maintenance rituxan, which is currently postponed.  Rituximab is a risk factor for persistent COVID infection.  There is now a hypovascular mass of the thyroid gland enlarging on serial imaging, concern for lymphoma.  IR consulted for biopsy.    9.  ELIESER.  Likely multifactorial due to prerenal status, medications.  With worsening BUN and creatinine.  Concern GI bleed make a contributing to high BUN.  Would also consider if this is attributing to worsening mental status.  Status post CRRT, now off with improvement in renal function. HD catheter removed due to persistent fungemia   -Monitor creatinine closely   -Nephrology following    10.  Anemia, thrombocytopenia, lymphopenia/neutropenia.  Patient has had persistent lymphopenia throughout this admission, likely due to rituximab, viral infection, high-dose steroids.  Now with worsening anemia, neutropenia, and thrombocytopenia.  Bactrim discontinued 3/18. CMV PCR negative.  Not a likely side effect of minocycline.  Immunoglobulins are low.  Started on Granix 3/27, white blood cell count now improved.  May be seen in IRIS type response with multiple underlying infections. Current bandemia likely multifactorial from recent G-CSF, weaning steroids, multiple infections   -Transfusion support per primary   -Hematology follow-up   -Monitor CBC    11.  GI bleed, ulcerations.  Status post EGD yesterday which  showed erosive gastritis, esophageal tear with oozing, blood and extensive clot in the esophagus and stomach, small ulcers and trauma throughout the stomach.  Status post repeat EGD .  GMS, CMV/HSV stains negative on pathology.   -Continue PPI    12. Persistent encephalopathy. Worsened 3/30. MRI brain shows multiple small bilateral foci of acute infarcts as described suggesting embolic etiology, partial lack of sulcal CSF suppression in the FLAIR sequence noted in the supratentorial and infratentorial brain. This could be artifactual/technique related; however, inflammatory or infectious process is not excluded. Patient opens her eyes and tracks today   -consider LP due to persistent encephalopathy if no significant improvement in mental status; send aerobic and anaerobic, AFB and fungal cultures, ME panel, crypto Ag, cytology   -follow up SATURNINO   -neurology following    13.  Elevated alk phos/T. bili.  Worsening over the past few days.  May be related to fluconazole.  Right upper quadrant ultrasound no significant abnormalities.   -If LFTs worsen will discontinue fluconazole and switch to micafungin    Above management plan to continue antibiotics/antifungals, follow up SATURNINO and IR aspiration with the critical care team. ID will follow.    Antibiotics:  Fluconazole  Minocycline  Zosyn  Bactrim ppx    Subjective:  The patient has her eyes open today but is not interacting.  Temperature 100.6.  She remains hemodynamically stable with persistent leukocytosis and bandemia.  With IVC filter placement yesterday.  Plan for SATURNINO today.    Objective:  Vitals:  Temp:  [96.8 °F (36 °C)-100.6 °F (38.1 °C)] 100.6 °F (38.1 °C)  HR:  [] 109  Resp:  [18-31] 25  BP: (103-125)/(55-78) 123/66  SpO2:  [95 %-100 %] 98 %  Temp (24hrs), Av.3 °F (36.8 °C), Min:96.8 °F (36 °C), Max:100.6 °F (38.1 °C)  Current: Temperature: (!) 100.6 °F (38.1 °C)    Physical Exam:   General Appearance:  Ill-appearing, lethargic   Neck: Trach in  place.   Lungs:   Minimal rhonchi bilaterally   Heart:  RRR; no murmur, rub or gallop   Abdomen:   Midline wound with dressing in place   Extremities: No edema   Skin: No new rashes or lesions.        Labs:   All pertinent labs and imaging studies were personally reviewed  Results from last 7 days   Lab Units 04/10/24  0543 04/09/24  1652 04/09/24  0446   WBC Thousand/uL 19.87* 18.56* 19.91*   HEMOGLOBIN g/dL 9.5* 8.7* 8.9*   PLATELETS Thousands/uL 46* 39* 54*     Results from last 7 days   Lab Units 04/10/24  0543 04/09/24  0446 04/08/24  0505 04/07/24  1834 04/07/24  0508   SODIUM mmol/L 141 139 141   < > 138   POTASSIUM mmol/L 3.7 4.4 4.4   < > 4.9   CHLORIDE mmol/L 107 106 107   < > 105   CO2 mmol/L 20* 19* 22   < > 21   BUN mg/dL 62* 60* 55*   < > 46*   CREATININE mg/dL 1.13 1.02 0.86   < > 0.79   EGFR ml/min/1.73sq m 53 60 74   < > 82   CALCIUM mg/dL 10.2 10.2 10.2   < > 10.2   AST U/L 24 23  --   --  24   ALT U/L 42 35  --   --  37   ALK PHOS U/L 918* 759*  --   --  900*    < > = values in this interval not displayed.     Results from last 7 days   Lab Units 04/05/24  0522   PROCALCITONIN ng/ml 5.61*       Results from last 7 days   Lab Units 04/07/24  0508   CRP mg/L 165.6*       Imaging:  MRI brain: Multiple small bilateral foci of acute infarcts, areas of new infarct with hemorrhagic conversion

## 2024-04-10 NOTE — ANESTHESIA PREPROCEDURE EVALUATION
Procedure:  IR DRAINAGE TUBE PLACEMENT    Relevant Problems   CARDIO   (+) Hypertensive disorder      ENDO   (+) Disorder of parathyroid gland (HCC)      GI/HEPATIC   (+) Cecal volvulus (HCC)      /RENAL   (+) Acute kidney injury (HCC)   (+) Nephrolithiasis   (+) Stage 3b chronic kidney disease (CKD) (HCC)      HEMATOLOGY   (+) Teto marginal zone B-cell lymphoma (HCC)   (+) Pancytopenia (HCC)      NEURO/PSYCH   (+) Anxiety state   (+) Cerebrovascular accident (CVA) due to embolism (HCC)   (+) Grand mal status (HCC)   (+) Other specified anxiety disorders   (+) Reactive depression   (+) Seizure disorder (HCC)   (+) Subjective visual disturbance      PULMONARY   (+) Acute respiratory failure with hypoxia (HCC)      4/7/2024: EF 70, normal RV systolic function    CVVH         Anesthesia Plan  ASA Score- 4     Anesthesia Type- general with ASA Monitors.         Additional Monitors:     Airway Plan:     Comment: Cuffed trach in place; lines in place  Standard ASA monitors  Prone  I discussed plan with Dr. Armenta; I discussed risks and benefits with patient's ; consent obtained, questions answered, he agrees to proceed..       Plan Factors-    Chart reviewed.  Imaging results reviewed. Existing labs reviewed.                   Induction- inhalational and intravenous.    Postoperative Plan-     Informed Consent- Anesthetic plan and risks discussed with spouse.  I personally reviewed this patient with the CRNA. Discussed and agreed on the Anesthesia Plan with the CRNA..

## 2024-04-10 NOTE — PROCEDURES
Wound Vac Take Down  Midline wound vac taken down with removal of one black sponge, 1 white sponge, 1 adaptic, and 1 plastic ring. Wound bed with bilious staining and bilious staining over the white sponge. New dehiscence of the fascia noted along the inferior portion of the fascial closure. Bowel frozen in place with no opening into the abdominal cavity. Peristomal stool present with subfascial stool noted. Subfascial opening flushed and finger swept. Right lateral wound with old hematoma and no track to identified fluid collection seen on CT scan.     1 adaptic placed around the ostomy with 1 adaptic placed over the fascial opening. 1 plastic ring with coloplast placed around the ostomy. 1 White sponge placed over the fascial wound. 1 black sponge placed over entire wound with cutout for ostomy. Wound Covered with vac drape with cut out for ostomy. Madison pad placed and wound manager placed over ostomy and folded down to plastic ring. Leak test with secure suction.    Next wound vac change planned for 4/12.

## 2024-04-10 NOTE — RESPIRATORY THERAPY NOTE
Resp care   04/10/24 0850   Respiratory Assessment   Assessment Type Assess only   General Appearance Awake   Respiratory Pattern Assisted   Chest Assessment Chest expansion symmetrical   Bilateral Breath Sounds Coarse   Suction Trach   Resp Comments Pt placed on spont with settings per flow sheet at this time. Will cont to monitor and place back on simv if pt shows sign of fatigue   Surgical Airway Shiley Cuffed   Placement Date/Time: 03/12/24 1200   Tube Size: 8 mm  Type: Tracheostomy  Brand: Naveen  Style: Cuffed   Status Cuff Inflated   Site Assessment Dry   Site Care Protective barrier to skin   Surgical Airway Cuff Pressure (color) Green   Equipment at bedside BVM;Wall Suction setup;Additional complete same size trach tube;Additional complete one size smaller trach tube;Obturator;Additional inner cannula

## 2024-04-10 NOTE — PROGRESS NOTES
NEPHROLOGY PROGRESS NOTE   Carla Hunt 59 y.o. female MRN: [id number]  [facility]: [id number]      ASSESSMENT & PLAN:  1 ELIESER, as per previous note suspected secondary prior ELIESER, component of ATN, Bactrim use, GI bleeding.  Patient initially on CRRT, currently off.  Good diuresis response with 1.9 L of urine output over the last 24 hours.  Creatinine 1.13 today  Continue current medical support.  Follow daily labs and monitor ins and outs.  Keep MAP over 65 mmHg.  Patient had a CAT scan with contrast yesterday recommend to hold diuresis today if possible and if renal function remains stable tomorrow okay to resume diuretics to achieve euvolemia.  Monitor ins and outs and follow daily labs.  No further recommendation from kidney standpoint of view, will sign off, call if any questions    #2  Prior elevated anion gap in the setting of ELIESER, improved with CRRT.  Noted patient with persistent anion gap metabolic acidosis with lactic acidosis yesterday, recommend to recheck lactic acid today, more likely hypotension.    3 azotemia with concern for possible uremia, initially started on CRRT on 4/4, now off.    4 acute hypoxemic respiratory failure ventilator dependent status post trach    #5 encephalopathy, suspected multifactorial, SAH, history of seizure disorder, now embolic strokes, management as per critical care team, neurology on board    #6 stenotrophomonas pneumonia, Candida pneumonia, candidemia, management as per primary team, infectious disease on board, plan for SATURNINO    #7 hyperphosphatemia in the setting of ELIESER, continue with Nepro tube feeds    #8 anemia in the setting of GI bleeding, B-cell lymphoma, hemoglobin low but is stable, status post blood transfusion again 4/08, hemoglobin low but is stable at 9.5 this morning    #9 thyroid mass with concern for airway compression, thyroid biopsy was canceled, plan to repeat thyroid ultrasound next week    Recommendation was discussed with  critical care attending, follow lactic acid, consider gentle IV fluids versus albumin if lactic acidosis persist, recommend to hold diuresis today after contrast yesterday, if renal function remains stable diuretics can be resumed diuresis tomorrow to achieve euvolemia.  No further recommendation from kidney standpoint of view, will sign off, call if any questions they agree with the plan    SUBJECTIVE:  Patient seen and examined, remains trach to vent, tachycardic, blood pressure stable in the lower side, noted temp of 37.8 this morning, eyes are open, does not follow commands.  ROS limited.  Received Lasix yesterday with good diuresis response of 1.9 L over the last 24 hours.  Noted had a CT scan with IV contrast yesterday afternoon    OBJECTIVE:  Current Weight: Weight - Scale: 86.2 kg (190 lb 0.6 oz)  Vitals:    04/10/24 0600   BP: 104/58   Pulse: (!) 113   Resp: (!) 25   Temp: 100 °F (37.8 °C)   SpO2: 96%       Intake/Output Summary (Last 24 hours) at 4/10/2024 0802  Last data filed at 4/10/2024 0600  Gross per 24 hour   Intake 1287.07 ml   Output 2060 ml   Net -772.93 ml       General: Ill-appearing, opening eyes, does not follow commands, in not acute distress  Eyes: conjunctivae pale, anicteric sclerae  ENT: lips and mucous membranes dry  Neck: supple, no JVD, trach to vent  Chest: Coarse breath sounds bilateral, on ventilator  CVS: Tachycardic  Abdomen: soft, non-tender, non-distended, normoactive bowel sounds  Extremities: Positive edema of both legs  Skin: no rash  Neuro: awake, trach to vent, opening eyes to voice, does not follow commands          Medications:    Current Facility-Administered Medications:     acetaminophen (TYLENOL) tablet 650 mg, 650 mg, Oral, Q6H PRN, Mahamed P Cardenas, DO, 650 mg at 04/07/24 1257    chlorhexidine (PERIDEX) 0.12 % oral rinse 15 mL, 15 mL, Mouth/Throat, Q12H SARTHAK, Mahamed P Cardenas, DO, 15 mL at 04/09/24 2038    dexmedeTOMIDine (Precedex) 400 mcg in sodium chloride 0.9% 100  mL, 0.1-0.7 mcg/kg/hr, Intravenous, Titrated, Susan Prince DO, Held at 04/08/24 1422    fentaNYL injection 50 mcg, 50 mcg, Intravenous, Q1H PRN, Susan Prince DO, 50 mcg at 04/08/24 1150    fluconazole (DIFLUCAN) (HIGH DOSE) IVPB 800 mg, 800 mg, Intravenous, Q24H, Mahamed Cardenas DO, Stopped at 04/09/24 1302    insulin lispro (HumALOG/ADMELOG) 100 units/mL subcutaneous injection 1-6 Units, 1-6 Units, Subcutaneous, Q6H SARTHAK, 1 Units at 04/10/24 0555 **AND** Fingerstick Glucose (POCT), , , Q6H, Susan Prince DO    lacosamide (VIMPAT) tablet 100 mg, 100 mg, Per NG Tube, Q12H SARTHAK, Susan Prince DO, 100 mg at 04/09/24 2040    lidocaine 1% buffered, , Infiltration, PRN, Shahriar Shen MD, 10 mL at 04/09/24 1515    magnesium sulfate 2 g/50 mL IVPB (premix) 2 g, 2 g, Intravenous, Once, Livan Morfin DO    metoprolol tartrate (LOPRESSOR) partial tablet 12.5 mg, 12.5 mg, Per NG Tube, Q12H SARTHAK, Susan Prince DO, 12.5 mg at 04/09/24 2040    minocycline (DYNACIN) tablet 200 mg, 200 mg, Per NG Tube, Q12H, Susan Prince DO, 200 mg at 04/09/24 2043    nystatin (MYCOSTATIN) powder, , Topical, BID, Mahamed Cardenas DO, Given at 04/09/24 1710    omeprazole (PRILOSEC) suspension 2 mg/mL, 20 mg, Per NG Tube, Daily, Susan Prince DO    ondansetron (ZOFRAN) injection 4 mg, 4 mg, Intravenous, Q6H PRN, Mahamed Cardenas DO    polyethylene glycol (MIRALAX) packet 17 g, 17 g, Per NG Tube, Daily, Mahamed Cardenas DO, 17 g at 04/09/24 1020    potassium chloride oral solution 40 mEq, 40 mEq, Per NG Tube, Once, Livan Morfin,     sodium chloride (AYR SALINE NASAL) nasal gel 1 Application, 1 Application, Nasal, Q1H PRN, Mahamed Cardenas DO, 1 Application at 03/29/24 2212    sodium chloride (OCEAN) 0.65 % nasal spray 2 spray, 2 spray, Each Nare, BID, Mahamed Cardenas DO, 2 spray at 04/09/24 2045    sodium chloride 3 % inhalation solution  4 mL, 4 mL, Nebulization, TID, Mahamed Cardenas DO, 4 mL at 04/09/24 1955    sulfamethoxazole-trimethoprim (BACTRIM DS) 800-160 mg per tablet 1 tablet, 1 tablet, Per NG Tube, Once per day on Monday Wednesday Friday, Mahamed Cardenas DO, 1 tablet at 04/08/24 0915    Invasive Devices:   Urethral Catheter Three way 18 Fr. (Active)   Reasons to continue Urinary Catheter  Acute urinary retention/obstruction failing urinary retention protocol 04/07/24 2200   Goal for Removal Voiding trial when ambulation improves 04/07/24 2200   Site Assessment Clean;Skin intact 04/07/24 2200   Ortiz Care Done 04/07/24 2100   Collection Container Standard drainage bag 04/07/24 2200   Securement Method Securing device (Describe) 04/07/24 2200   Irrigant Normal saline 04/02/24 1207   Urethral Irrigation Intake (mL) 60 mL 04/04/24 0606   Output (mL) 100 mL 04/08/24 0415       Lab Results:   Results from last 7 days   Lab Units 04/10/24  0543 04/09/24  1652 04/09/24  0446 04/08/24  1759 04/08/24  0505 04/07/24  1834 04/07/24  0508   WBC Thousand/uL 19.87* 18.56* 19.91*   < > 16.24*   < > 15.69*   HEMOGLOBIN g/dL 9.5* 8.7* 8.9*   < > 7.6*   < > 7.8*   HEMATOCRIT % 27.8* 25.0* 26.8*   < > 23.5*   < > 23.9*   PLATELETS Thousands/uL 46* 39* 54*   < > 36*   < > 59*   SODIUM mmol/L 141  --  139  --  141   < > 138   POTASSIUM mmol/L 3.7  --  4.4  --  4.4   < > 4.9   CHLORIDE mmol/L 107  --  106  --  107   < > 105   CO2 mmol/L 20*  --  19*  --  22   < > 21   BUN mg/dL 62*  --  60*  --  55*   < > 46*   CREATININE mg/dL 1.13  --  1.02  --  0.86   < > 0.79   CALCIUM mg/dL 10.2  --  10.2  --  10.2   < > 10.2   MAGNESIUM mg/dL 1.9  --  1.9  --  2.0  --  1.9   PHOSPHORUS mg/dL 5.7*  --  5.4*  --  4.8*  --  4.6*   ALK PHOS U/L 918*  --  759*  --   --   --  900*   ALT U/L 42  --  35  --   --   --  37   AST U/L 24  --  23  --   --   --  24    < > = values in this interval not displayed.       Previous work up:  See previous notes      Portions of the record may  "have been created with voice recognition software. Occasional wrong word or \"sound a like\" substitutions may have occurred due to the inherent limitations of voice recognition software. Read the chart carefully and recognize, using context, where substitutions have occurred.If you have any questions, please contact the dictating provider.  "

## 2024-04-10 NOTE — PLAN OF CARE
Problem: Prexisting or High Potential for Compromised Skin Integrity  Goal: Skin integrity is maintained or improved  Description: INTERVENTIONS:  - Identify patients at risk for skin breakdown  - Assess and monitor skin integrity  - Assess and monitor nutrition and hydration status  - Monitor labs   - Assess for incontinence   - Turn and reposition patient  - Assist with mobility/ambulation  - Relieve pressure over bony prominences  - Avoid friction and shearing  - Provide appropriate hygiene as needed including keeping skin clean and dry  - Evaluate need for skin moisturizer/barrier cream  - Collaborate with interdisciplinary team   - Patient/family teaching  - Consider wound care consult   Outcome: Progressing     Problem: Nutrition/Hydration-ADULT  Goal: Nutrient/Hydration intake appropriate for improving, restoring or maintaining nutritional needs  Description: Monitor and assess patient's nutrition/hydration status for malnutrition. Collaborate with interdisciplinary team and initiate plan and interventions as ordered.  Monitor patient's weight and dietary intake as ordered or per policy. Utilize nutrition screening tool and intervene as necessary. Determine patient's food preferences and provide high-protein, high-caloric foods as appropriate.     INTERVENTIONS:  - Monitor oral intake, urinary output, labs, and treatment plans  - Assess nutrition and hydration status and recommend course of action  - Evaluate amount of meals eaten  - Assist patient with eating if necessary   - Allow adequate time for meals  - Recommend/ encourage appropriate diets, oral nutritional supplements, and vitamin/mineral supplements  - Order, calculate, and assess calorie counts as needed  - Recommend, monitor, and adjust tube feedings and TPN/PPN based on assessed needs  - Assess need for intravenous fluids  - Provide specific nutrition/hydration education as appropriate  - Include patient/family/caregiver in decisions related to  nutrition  Outcome: Progressing     Problem: INFECTION - ADULT  Goal: Absence or prevention of progression during hospitalization  Description: INTERVENTIONS:  - Assess and monitor for signs and symptoms of infection  - Monitor lab/diagnostic results  - Monitor all insertion sites, i.e. indwelling lines, tubes, and drains  - Monitor endotracheal if appropriate and nasal secretions for changes in amount and color  - [place] appropriate cooling/warming therapies per order  - Administer medications as ordered  - Instruct and encourage patient and family to use good hand hygiene technique  - Identify and instruct in appropriate isolation precautions for identified infection/condition  Outcome: Progressing     Problem: SAFETY ADULT  Goal: Patient will remain free of falls  Description: INTERVENTIONS:  - Educate patient/family on patient safety including physical limitations  - Instruct patient to call for assistance with activity   - Consult OT/PT to assist with strengthening/mobility   - Keep Call bell within reach  - Keep bed low and locked with side rails adjusted as appropriate  - Keep care items and personal belongings within reach  - Initiate and maintain comfort rounds  - Make Fall Risk Sign visible to staff  - Offer Toileting every 2 Hours, in advance of need  - Initiate/Maintain bed alarm  - Obtain necessary fall risk management equipment: non skid footwear  - Apply yellow socks and bracelet for high fall risk patients  - Consider moving patient to room near nurses station  Outcome: Progressing     Problem: RESPIRATORY - ADULT  Goal: Achieves optimal ventilation and oxygenation  Description: INTERVENTIONS:  - Assess for changes in respiratory status  - Assess for changes in mentation and behavior  - Position to facilitate oxygenation and minimize respiratory effort  - Oxygen administered by appropriate delivery if ordered  - Initiate smoking cessation education as indicated  - Encourage broncho-pulmonary  hygiene including cough, deep breathe, Incentive Spirometry  - Assess the need for suctioning and aspirate as needed  - Assess and instruct to report SOB or any respiratory difficulty  - Respiratory Therapy support as indicated  Outcome: Progressing     Problem: SKIN/TISSUE INTEGRITY - ADULT  Goal: Skin Integrity remains intact(Skin Breakdown Prevention)  Description: Assess:  -Perform Flavio assessment every shift   -Clean and moisturize skin every day   -Inspect skin when repositioning, toileting, and assisting with ADLS  -Assess under medical devices such as ronny  every hour   -Assess extremities for adequate circulation and sensation     Bed Management:  -Have minimal linens on bed & keep smooth, unwrinkled  -Change linens as needed when moist or perspiring  -Avoid sitting or lying in one position for more than 2 hours while in bed  -Keep HOB at 45 degrees     Toileting:  -Offer bedside commode  -Assess for incontinence every hour  -Use incontinent care products after each incontinent episode such as moisture barrier cream     Activity:  -Mobilize patient 3 times a day  -Encourage activity and walks on unit  -Encourage or provide ROM exercises   -Turn and reposition patient every 2 Hours  -Use appropriate equipment to lift or move patient in bed  -Instruct/ Assist with weight shifting every hour when out of bed in chair  -Consider limitation of chair time 2 hour intervals    Skin Care:  -Avoid use of baby powder, tape, friction and shearing, hot water or constrictive clothing  -Relieve pressure over bony prominences using allevyn   -Do not massage red bony areas    Next Steps:  -Teach patient strategies to minimize risks such as weight shifting    -Consider consults to  interdisciplinary teams such as PT  Outcome: Progressing  Goal: Incision(s), wounds(s) or drain site(s) healing without S/S of infection  Description: INTERVENTIONS  - Assess and document dressing, incision, wound bed, drain sites and  surrounding tissue  - Provide patient and family education  - Perform skin care/dressing changes every 12 hr  Outcome: Progressing  Goal: Pressure injury heals and does not worsen  Description: Interventions:  - Implement low air loss mattress or specialty surface (Criteria met)  - Apply silicone foam dressing  - Instruct/assist with weight shifting every 120 minutes when in chair   - Limit chair time to 2 hour intervals  - Use special pressure reducing interventions such as wedges  when in chair   - Apply fecal or urinary incontinence containment device   - Perform passive or active ROM every 4 hr  - Turn and reposition patient & offload bony prominences every 2 hours   - Utilize friction reducing device or surface for transfers   - Consider consults to  interdisciplinary teams such as pt/ot  - Use incontinent care products after each incontinent episode such as incontinence cleanser  - Consider nutrition services referral as needed  Outcome: Progressing     Problem: NEUROSENSORY - ADULT  Goal: Achieves stable or improved neurological status  Description: INTERVENTIONS  - Monitor and report changes in neurological status  - Monitor vital signs such as temperature, blood pressure, glucose, and any other labs ordered   - Initiate measures to prevent increased intracranial pressure  - Monitor for seizure activity and implement precautions if appropriate      Outcome: Progressing  Goal: Achieves maximal functionality and self care  Description: INTERVENTIONS  - Monitor swallowing and airway patency with patient fatigue and changes in neurological status  - Encourage and assist patient to increase activity and self care.   - Encourage visually impaired, hearing impaired and aphasic patients to use assistive/communication devices  Outcome: Progressing     Problem: CARDIOVASCULAR - ADULT  Goal: Maintains optimal cardiac output and hemodynamic stability  Description: INTERVENTIONS:  - Monitor I/O, vital signs and rhythm  -  Monitor for S/S and trends of decreased cardiac output  - Administer and titrate ordered vasoactive medications to optimize hemodynamic stability  - Assess quality of pulses, skin color and temperature  - Assess for signs of decreased coronary artery perfusion  - Instruct patient to report change in severity of symptoms  Outcome: Progressing  Goal: Absence of cardiac dysrhythmias or at baseline rhythm  Description: INTERVENTIONS:  - Continuous cardiac monitoring, vital signs, obtain 12 lead EKG if ordered  - Administer antiarrhythmic and heart rate control medications as ordered  - Monitor electrolytes and administer replacement therapy as ordered  Outcome: Progressing     Problem: GASTROINTESTINAL - ADULT  Goal: Minimal or absence of nausea and/or vomiting  Description: INTERVENTIONS:  - Administer IV fluids if ordered to ensure adequate hydration  - Maintain NPO status until nausea and vomiting are resolved  - Nasogastric tube if ordered  - Administer ordered antiemetic medications as needed  - Provide nonpharmacologic comfort measures as appropriate  - Advance diet as tolerated, if ordered  - Consider nutrition services referral to assist patient with adequate nutrition and appropriate food choices  Outcome: Progressing  Goal: Maintains or returns to baseline bowel function  Description: INTERVENTIONS:  - Assess bowel function  - Encourage oral fluids to ensure adequate hydration  - Administer IV fluids if ordered to ensure adequate hydration  - Administer ordered medications as needed  - Encourage mobilization and activity  - Consider nutritional services referral to assist patient with adequate nutrition and appropriate food choices  Outcome: Progressing  Goal: Maintains adequate nutritional intake  Description: INTERVENTIONS:  - Monitor percentage of each meal consumed  - Identify factors contributing to decreased intake, treat as appropriate  - Assist with meals as needed  - Monitor I&O, weight, and lab  values if indicated  - Obtain nutrition services referral as needed  Outcome: Progressing  Goal: Establish and maintain optimal ostomy function  Description: INTERVENTIONS:  - Assess bowel function  - Encourage oral fluids to ensure adequate hydration  - Administer IV fluids if ordered to ensure adequate hydration   - Administer ordered medications as needed  - Encourage mobilization and activity  - Nutrition services referral to assist patient with appropriate food choices  - Assess stoma site  - Consider wound care consult   Outcome: Progressing  Goal: Oral mucous membranes remain intact  Description: INTERVENTIONS  - Assess oral mucosa and hygiene practices  - Implement preventative oral hygiene regimen  - Implement oral medicated treatments as ordered  - Initiate Nutrition services referral as needed  Outcome: Progressing     Problem: GENITOURINARY - ADULT  Goal: Maintains or returns to baseline urinary function  Description: INTERVENTIONS:  - Assess urinary function  - Encourage oral fluids to ensure adequate hydration if ordered  - Administer IV fluids as ordered to ensure adequate hydration  - Administer ordered medications as needed  - Offer frequent toileting  - Follow urinary retention protocol if ordered  Outcome: Progressing  Goal: Urinary catheter remains patent  Description: INTERVENTIONS:  - Assess patency of urinary catheter  - If patient has a chronic marie, consider changing catheter if non-functioning  - Follow guidelines for intermittent irrigation of non-functioning urinary catheter  Outcome: Progressing     Problem: METABOLIC, FLUID AND ELECTROLYTES - ADULT  Goal: Electrolytes maintained within normal limits  Description: INTERVENTIONS:  - Monitor labs and assess patient for signs and symptoms of electrolyte imbalances  - Administer electrolyte replacement as ordered  - Monitor response to electrolyte replacements, including repeat lab results as appropriate  - Instruct patient on fluid and  nutrition as appropriate  Outcome: Progressing  Goal: Fluid balance maintained  Description: INTERVENTIONS:  - Monitor labs   - Monitor I/O and WT  - Instruct patient on fluid and nutrition as appropriate  - Assess for signs & symptoms of volume excess or deficit  Outcome: Progressing  Goal: Glucose maintained within target range  Description: INTERVENTIONS:  - Monitor Blood Glucose as ordered  - Assess for signs and symptoms of hyperglycemia and hypoglycemia  - Administer ordered medications to maintain glucose within target range  - Assess nutritional intake and initiate nutrition service referral as needed  Outcome: Progressing     Problem: HEMATOLOGIC - ADULT  Goal: Maintains hematologic stability  Description: INTERVENTIONS  - Assess for signs and symptoms of bleeding or hemorrhage  - Monitor labs  - Administer supportive blood products/factors as ordered and appropriate  Outcome: Progressing     Problem: MUSCULOSKELETAL - ADULT  Goal: Maintain or return mobility to safest level of function  Description: INTERVENTIONS:  - Assess patient's ability to carry out ADLs; assess patient's baseline for ADL function and identify physical deficits which impact ability to perform ADLs (bathing, care of mouth/teeth, toileting, grooming, dressing, etc.)  - Assess/evaluate cause of self-care deficits   - Assess range of motion  - Assess patient's mobility  - Assess patient's need for assistive devices and provide as appropriate  - Encourage maximum independence but intervene and supervise when necessary  - Involve family in performance of ADLs  - Assess for home care needs following discharge   - Consider OT consult to assist with ADL evaluation and planning for discharge  - Provide patient education as appropriate  Outcome: Progressing     Problem: BEHAVIOR  Goal: Pt/Family maintain appropriate behavior and adhere to behavioral management agreement, if implemented  Description: INTERVENTIONS:  - Assess the family dynamic    - Encourage verbalization of thoughts and concerns in a socially appropriate manner  - Assess patient/family's coping skills and non-compliant behavior (including use of illegal substances).  - Utilize positive, consistent limit setting strategies supporting safety of patient, staff and others  - Initiate consult with Case Management, Spiritual Care or other ancillary services as appropriate  - If a patient's/visitor's behavior jeopardizes the safety of the patient, staff, or others, refer to organization procedure.   - Notify Security of behavior or suspected illegal substances which indicate the need for search of the patient and/or belongings  - Encourage participation in the decision making process about a behavioral management agreement; implement if patient meets criteria  Outcome: Progressing     Problem: Potential for Falls  Goal: Patient will remain free of falls  Description: INTERVENTIONS:  - Educate patient/family on patient safety including physical limitations  - Instruct patient to call for assistance with activity   - Consult OT/PT to assist with strengthening/mobility   - Keep Call bell within reach  - Keep bed low and locked with side rails adjusted as appropriate  - Keep care items and personal belongings within reach  - Initiate and maintain comfort rounds  - Make Fall Risk Sign visible to staff  - Offer Toileting every 2 Hours, in advance of need  - Initiate/Maintain bed alarm  - Obtain necessary fall risk management equipment: non skid footwear  - Apply yellow socks and bracelet for high fall risk patients  - Consider moving patient to room near nurses station  Outcome: Progressing     Problem: MOBILITY - ADULT  Goal: Maintain or return to baseline ADL function  Description: INTERVENTIONS:  -  Assess patient's ability to carry out ADLs; assess patient's baseline for ADL function and identify physical deficits which impact ability to perform ADLs (bathing, care of mouth/teeth, toileting,  grooming, dressing, etc.)  - Assess/evaluate cause of self-care deficits   - Assess range of motion  - Assess patient's mobility; develop plan if impaired  - Assess patient's need for assistive devices and provide as appropriate  - Encourage maximum independence but intervene and supervise when necessary  - Involve family in performance of ADLs  - Assess for home care needs following discharge   - Consider OT consult to assist with ADL evaluation and planning for discharge  - Provide patient education as appropriate  Outcome: Progressing  Goal: Maintains/Returns to pre admission functional level  Description: INTERVENTIONS:  - Perform AM-PAC 6 Click Basic Mobility/ Daily Activity assessment daily.  - Set and communicate daily mobility goal to care team and patient/family/caregiver.   - Collaborate with rehabilitation services on mobility goals if consulted  - Perform Range of Motion 4 times a day.  - Reposition patient every 2 hours.  - Dangle patient 0 times a day  - Stand patient 0 times a day  - Ambulate patient 0 times a day  - Out of bed to chair 0 times a day   - Out of bed for meals 0 times a day  - Out of bed for toileting  - Record patient progress and toleration of activity level   Outcome: Progressing

## 2024-04-10 NOTE — PROGRESS NOTES
[facility]  Progress Note: Critical Care  Name: Carla Hunt 59 y.o. female I MRN: [id number]  Unit/Bed#: MICU 10 I Date of Admission: 1/10/2024   Date of Service: 4/10/2024 I Hospital Day: 91    Assessment/Plan   Neuro:  #Alertness & analgesia  - OFF modafinil 100mg qd as of 4/8  - Delirium precautions  - Patient is awake and opening eyes but not moving purposefully or respond to commands, will hold on any sedating medications at this time  - Hold analgesia and sedation aside from periprocedural requirements     #Metabolic encephalopathy; POA  - Waxing and waning neuro exam since admission  - Most recent repeat CTH 4/5 without acute intracranial abnormality - obtained due to decline in mental status after interval improvement  - MRI brain on 4/7 showed findings suggestive of embolic etiology without significant edema, mass effect or hemorrhagic transformation (suspect septic emboli)  - Electrolytes, sodium, hyperglycemia 2/2 high dose steroids requiring insulin gtt. Ammonia normal. TME may be prolonged 2/2 PNA, possibly worsened by uremic encephalopathy worsened by ELIESER now resolved, uremia improving s/p CRRT   - Bcx 2/2 (3/31) growing fungemia, identified as candida albicans  - Bedside EGD x2 (4/2,4/3) with ulcerated mucosa in the upper third of the esophagus without evidence of hemorrhage   - Cryptococcal ag negative  - Aspergillus galactomannan ag negative  - Tb quantiferon gold indeterminate 2/2 lymphopenia    --  Plan:  - Corticosteroids now weaned to off  - CRRT off with persistent uremia   - Follow up serial blood cultures - positive for yeast  - Plan for SATURNINO as below  - Possible concomitant intraabdominal infection related to ostomy (see ID A/P)  - CT head with small posterior IPH and concern for reduced attenuation in left hemisphere   - Will get STAT MRI brain today to further assess and follow Neurology recommendations   - Will keep systolic BP <140 and Plt  above 50  - Frequent neurochecks     #CVA due to multiple bilateral foci of acute infarcts   - MRI brain 4/6- multiple small bilateral foci of acute infarcts. No significant edema, mass effect, or hemorrhagic transformation -- suspicious for septic embolic phenomenon  - Not on AP/AC. Recent TTE 4/1 without vegetation   Plan:  - SATURNINO requested to assess for endocarditis in light of brain MRI concerning for septic emboli in setting of fungemia; bubble study to assess pfo/cardioembolic phenomenon; cardiology consulted  - Avoid ASA due to risk for hemorraghic conversion in setting of possible septic emboli  - Continue statin  - Frequent neurochecks     #Seizure disorder, known history  - S/p partial left temporal lobe resection in 2007 2/2 complex migraines  - On Lamictal 150 bid and Topamax 50mg bid at home  - Last lamotrigine level from 03/02 at 10.7 (therapeutic)  - Home Lamictal switched to Vimpat 3/27 due to worsening pancytopenia, neuro following  - Continue Vimpat 100mg bid maintenance dose  - Seizure precautions      #Anxiety, known history  -On Effexor 12.5 mg TID     CV:  #Sinus tachycardia  - TTE 3/17 with no major structural dysfunction  - Multifactorial 2/2 deconditioning, dehydration/hypvol, acute on chronic anemia, underlying infectious/inflammatory process, pain/agitation  - TTE 4/1 with EF 70%, no WMA, no changes from prior  - TTE with bubble study 4/7 showed LVEF 70% with right to left shunting related to PFO vs intrapulmonary shunt  - Metoprolol tartrate for sinus tachycardia? - will discuss with cardiology about stopping this     Pulm:  #Acute hypoxic respiratory failure;  #Bilateral ground glass opacities, improving  - Vent dependent; s/p trach 3/12 after was reintubated 3/11 due to increased WOB  - Complicated by recurrent bacterial PNA treated w 10d levaquin (completed 2/25) for MDR PNA; most recent BAL +recurrent stenotrophomona treated with Bactrim, switched to minocyline 14d course completed  3/28.  - Etiology likely multifactorial including possible COVID pneumonitis vs recurrent PNA vs hypersensitivity pneumonitis 2/2 ?rituxumab. Persistent inflammation likely given patient has been steroid responsive throughout admission.   - Repeat CXR 3/22 with significant improvement of multifocal PNA. Repeat COVID ag negative x2 3/27  - Follow up anti-Rituximab ab  - Aspergillus galactomannan ag negative  - Tb quantiferon gold indeterminate 2/2 lymphopenia   Plan:  - S/P 14d Remdesivir course to tx COVID-19, completed 3/15  - S/P 14d Minocycline course to tx stenotrophomonas PNA, completed 3/27   - Continue second course minocycline for recurrent steno PNA as below   - Steroids now off  - Follow up serial blood cultures   - Airway clearance protocol   - IV diuresis if volume overloaded - will give another 40mg IV Lasix today  - Continue trach collar vs vent, wean O2 as able vs mechanical ventilation for respiratory distress     GI:  #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 2/2 poor wound healing midline incision  - Poor wound healing likely due to high dose steroid therapy s/p wound vac that was removed 3/17 by gen surg now s/p s/p OR 4/5 with surgically created mucus fistula and end ileostomy  Plan:  - Wound vac as per general surgery  - ALP and GGT elevated; likely ADR of fluconazole   - RUQ without acute biliary abnormality  - CT abdomen/pelvis with concern for purulent material in the peritoneum and collection adjacent to the ostomy (after discussion with Surgery; read from Radiology notes hemoperitoneum)   - Will tentatively arrange for IR drainage of fluid at same time as LP   - Appreciate Surgery assistance with this  - Monitor ostomy pouch output  - Off zosyn as below  - ID following  - General surgery following     #Oropharyngeal dysphagia   - Has had multiple and prolonged intubations now s/p trach   - VBS 3/27 oropharyngeal dysphagia  - Continue tube feeds  for now until potential PEG placement pending abdominal wound healing as per Gen Surg     #Esophagitis   - Acute on chronic anemia associated bloody NG output on 4/3  - Uremic, worsening suggestive of UGIB given relatively stable Cr  - Bedside EGD x2 (4/2,4/3) Ulcerated mucosa in the upper third of the esophagus without evidence of hemorrhage   Plan:  HSV/CMV/candida from GI sample negative  Continue PPI IV bid  Monitor NGT output, stool output  Concern for upper GI bleed given increasing BUN and decreased Hb (recently requiring transfusion 4/8 overnight) with thrombocytopenia and TEG normal; will lavage NGT to assess for upper GI bleed as EGD did reveal ulcerations     :  #Uremia  - ?catabolic from steroids, may also have ?slow UGIB in setting of prolonged steroids though no overt s/s of GIB and H&H stable since 3/23 and patient is on ppi  - Trending up   - EGD without active source of bleeding but did note ulcerations  - FEurea suggestive of intrinsic pathology   - Steroids now off  - Persistent off CRRT              - See above re lavage NGT plan  - Nephrology following, appreciate recommendations  - BMP daily, uremia currently stable        F/E/N:  F: intermittent IVF boluses as needed  E: monitor and replete for goal K>4, P>3, Mg>2  N: tube feeds with Nepro 45 cc/h, Prosource liquid protein to 1 packet BID, Refugio BID to support wound healing, 50cc FWF q4h        Heme/Onc:  #Pancytopenia, improving with intermittent plt/prbc's transfusion, s/p Filgastrim x3  - In setting of hx of B cell lymphoma, prior chemo, Rituxan therapy, prior abx and present meds including lamictal  - Hemo onc consulted, empirically given Filgastrim 300mg qd x3d (completed 3/30); IR bone marrow bx deferred by heme-onc   - Lamictal switched to Vimpat in consultation with neuro as above due to potential contribution to pancytopenia  - Trend CBC and diff daily, neutropenic precautions for ANC <500     #Acute on chronic anemia  - Baseline  Hgb ~7-8. S/P 2U PRBCs 3/17 after repeat Hgb 5.3, total of 6U transfused since admission.  - Patient had significant blood loss from ostomy site 3/20, resulting in hemorraghic shock, improved with blood transfusion; hemostasis achieved after bleeding source identified and sutured. Another large vol danie bloody output from osotomy on 3/21, bleeding source within ostomy site, sutured.    - Also having intermittent vaginal bleeding  - EGD 4/3,4/4 without active bleeding  - Now with new concern as of 4/9 for recurrent bleeding given acute drop in Hb 4/8 overnight requiring transfusion + thrombocytoepnia + elevated BUN + TEG normal              - Will lavage NGT to assess for possible upper GI source given ulcerations noted on EGD              - Also concern for hemolysis                          - Haptoglobin normal                          - LDH - elevated                          - Check peripheral smear              - CT chest abdomen pelvis w contrast to evaluate for intraabdominal bleed related to surgical complications in the past  - ?Worsened by impaired marrow response liekly 2/2 persistent inflammation due to low retic index ~1 prior to most recent transfusions; normocytic with normal B12/folate levels; MCV<100. Iatrogenic cause likely contributing 2/2 frequent blood draws; chronic anemia likely persistent inflammatory state/CKD/lymphoma in setting of elevated ferritin of 974. SPEP/UPEP negative.  Plan:  - Routine monitoring ostomy output, if bleeding, apply direct pressure and contact gen surg STAT for hemostasis .  - Continue tube feeds/nutritional support  - Give FFP/vitamin K to correct INR as indicated  - Trend CBC, transfuse Transfuse with leukoreduced and irradiated RBCs to maintain Hgb>7     #Thrombocytopenia  - Likely multifactorial including imparied production from marrow dysfunction in setting of persistent inflammation; RI low suggestive of hyperproliferation, hx of B-cell lymphoma, recent  infections including persistent COVID, PNA treated, CMV negative. No known history of vWD/congenital disorders. No liver dysfunction; recent hepatic profile unremarkable. HIT workup negative, Hemolytic workup negative. No s/s DIC. No mechanical valves. ?Primary ITP.  Plan:  - Filgrastim 300mg x3 to aid in plt recovery  - Transfuse with leukoreduced and irradiated PLTs if count <20k due to increased risk of bleeding   - Goal >50k if bleeding; goal >20K if no bleeding  - Hold Lovenox for DVT ppx if Plt<50k.  - See plan under acute on chronic anemia      # Lymphopenia with bandemia  - In setting of high dose steroids, now off  - Severely depressed immunoglobulins inclding IgG, IgA, IgM  - At risk for further infections  - Filgrastim 300mg x3 to aid in plt recovery  - Contact and airborne precautions     #B cell lymphoma  - Follows with heme/onc at [facility]  - S/P 6 cycles of chemotherapy in 20222  - Maintained on Rituxan for 2 year course PTA, started May 2022, last dose was 12/2024, treatment currently on hold in setting of persistent COVID-19 infection  Anti-Ritux Ab negative  Plan:  - Holding rituximab in setting of persistent COVID-19 infection, now resolved.  ?resume rituxubmab pending clinical stability & anti-ritux ab status in consultation with heme-onc     DVT ppx: Lovenox     Endo:  #Steroid hyperglycemia and diabetes mellitus type 2, A1c at 6.6 from 01/2024  - Goal -180  - Utilize SSI no insulin gtt     #R thyroid gland enlargement  - Seen on CT 4/5  - Follow up US 4/5- Limited exam due to overlying tracheostomy tube and central line catheter. Heterogeneous lobular appearance to the remaining thyroid gland, nonspecific, question sequela of thyroiditis. No discrete nodule identified.   T4/TSH unremarkable   Plan: no longer planning thyroid FNA biopsy; likely bleed related to tracheostomy insertion     ID:  #Fungemia   BCx growing yeast - most recently on 4/8, ID panel- candida albicans  In setting of severe  lymphopenia and severely depressed immunoglobulins   Septic emboli noted  Ophtho consulted, no evidence of ophthalmic endophthalmitis   Plan:   Hold further micofungin doses as per ID (d/c'd 4/5)  Continue fluconazole 800 mg qd (ELIESER resolved on CRRT)  Obtain LP and check AFB, fungal culture, bacterial culture, crypto, cytology, ME panel, autoimmune panel  SATURNINO per Cardiology  Avoid broad spectrum abx as it can contribute to fungal growth  Resume Zosyn as there is now concern for intraabdominal infection related to ostomy  HD cath out  F/U Repeat serial Bcx (q48h) to ensure clearance    ID following; all Abx will need dose adjustments if renal function worsens     #PCP ppx  Stop Bactrim as steroids are off     #Recurrent bacterial pneumonia  - Patient has completed multiple treatment courses of remdesivir throughout hospitalization in addition to 7 days of ceftriaxone.  - BAL cultures in 2/2024 positive for MDR Pseudomonas and stenotrophomonas treated with 10d course of Levaquin  - CT C/A/P 3/10 with persistent groundglass opacities and changes suggestive of sequelae of COVID, prior infections.  Completed 10d course of cefepime for broad spectrum coverage (completed (3/13)  - Repeat BAL cultures from 3/11 showing 4+ growth Stenotrophomonas maltophilia. Could be colonization given prior BAL growth of same, however due to recent intubation with prolonged corticosteroid theraphy, will begin treating as true pathogen. Patient otherwise remains afebrile, no leukocytosis. CRP with down trending.  Previously on Bactrim from 3/13 to 3/18, discontinued due to worsening ELIESER  Plan:  - S/P 14d Minocycline course to tx stenotrophomonas PNA, completed 3/27   - IV steroids as above  - Continue minocycline sputum Cx growing steno (3/31)         MSK/Skin:  #Midline incision wound with poor wound healing-  - General surgery performed staple removal of the patient's midline incision on 3/12 with some skin signs appreciated at the  inferior aspect of the wound without obvious pus drainage. Overnight 3/13, wound dehiscence progressed requiring general surgery reevaluation. Red/brown aspirate noted, fascia intact. Wet-to-dry dressings in place. General surgery following for next Epson wound care.  - Poor wound healing in setting of high-dose steroid therapy.  No  exam no obvious signs of infection at this time.   - S/P wound vac replacement 3/13 as per gen surgery. No active concerns for cellulitis.  Plan:  - Wound VAC management as per general surgery  - Wound care for peritracheostomy wound  - Abdominal wound care as per general surgery  - Routine monitoring     #Critical illness myopathy  - Likely exacerbated by high-dose steroid therapy  Plan:  - Continue to wean steroids as above  - Aggressive PT/OT once clinically stable             Disposition: Critical care    ICU Core Measures     Vented Patient  VAP Bundle  VAP bundle ordered     A: Assess, Prevent, and Manage Pain Has pain been assessed? NA  Need for changes to pain regimen? NA   B: Both Spontaneous Awakening Trials (SATs) and Spontaneous Breathing Trials (SBTs) Plan to perform spontaneous awakening trial today? Yes   Plan to perform spontaneous breathing trial today? Yes   Obvious barriers to extubation? No   C: Choice of Sedation RASS Goal: 0 Alert and Calm  Need for changes to sedation or analgesia regimen? NA   D: Delirium CAM-ICU: Unable to perform secondary to Acute cognitive dysfunction   E: Early Mobility  Plan for early mobility? NA   F: Family Engagement Plan for family engagement today? Yes       Antibiotic Review: Patient on appropriate coverage based on culture data.     Review of Invasive Devices:    Ortiz Plan: Continue for accurate I/O monitoring for 48 hours        Prophylaxis:  VTE Contraindicated secondary to: Plt <50   Stress Ulcer  covered byomeprazole (PRILOSEC) suspension 2 mg/mL [[id number]]        Significant 24hr Events     24hr events: Yesterday patient  underwent CT head and chest/abdomen/pelvis (see reads). Pertinent findings from chest/abdomen/pelvis include fluid collection associated with the complicated ostomy wound as well as hemoperitoneum. Upon discussion with surgery, this may not represent hemoperitoneum rather purulent material (regarding the fluid collection and free fluid in the abdomen) concerning for infection related to her ostomy. Coordinating with IR for drainage of this. Additionally CT head did reveal small posterior IPH. There was concern by the reading Radiologist for decreased attenuation of the left hemisphere, for which follow up MRI will be done today. IVC filter was placed by IR yesterday without complications. Additionally, patient examined mildly hypervolemic so she was given 40mg IV lasix x1 with adequate response.      Subjective     Review of Systems   Unable to perform ROS: Mental status change        Objective                            Vitals I/O      Most Recent Min/Max in 24hrs   Temp 100 °F (37.8 °C) Temp  Min: 96.8 °F (36 °C)  Max: 100 °F (37.8 °C)   Pulse (!) 113 Pulse  Min: 90  Max: 117   Resp (!) 25 Resp  Min: 17  Max: 31   /58 BP  Min: 104/58  Max: 136/74   O2 Sat 96 % SpO2  Min: 95 %  Max: 100 %      Intake/Output Summary (Last 24 hours) at 4/10/2024 0620  Last data filed at 4/10/2024 0600  Gross per 24 hour   Intake 1287.07 ml   Output 2185 ml   Net -897.93 ml       Diet Enteral/Parenteral; Tube Feeding No Oral Diet; Nepro; Cyclic; 45; 20 hours; Prosource Protein Liquid - One Packet; BID; Refugio Unflavored - One Packet; BID; 50; Water; Every 4 hours    Invasive Monitoring   Arterial Line  Amelia /60  No data recorded   MAP 83 mmHg  No data recorded           Physical Exam   Physical Exam  Vitals and nursing note reviewed.   HENT:      Head: Normocephalic and atraumatic.   Neck:      Trachea: Tracheostomy present.   Cardiovascular:      Rate and Rhythm: Regular rhythm. Tachycardia present.      Heart sounds: S1  normal and S2 normal. No murmur heard.  Musculoskeletal:      Cervical back: Neck supple.      Right lower le+ Pitting Edema present.      Left lower le+ Pitting Edema present.   Abdominal: General: Bowel sounds are normal.      Palpations: Abdomen is soft.   Constitutional:       General: She is in acute distress.      Appearance: She is ill-appearing.      Interventions: She is intubated.   Pulmonary:      Effort: She is intubated.      Breath sounds: Normal breath sounds.      Comments: Coarse breath sounds bilaterally  Neurological:      Mental Status: She is unresponsive.            Diagnostic Studies      EKG:   Imaging:  I have personally reviewed pertinent reports.       Medications:  Scheduled PRN   chlorhexidine, 15 mL, Q12H SARTHAK  fluconazole, 800 mg, Q24H  insulin lispro, 1-6 Units, Q6H SARTHAK  lacosamide, 100 mg, Q12H SARTHAK  metoprolol tartrate, 12.5 mg, Q12H SARTHAK  minocycline, 200 mg, Q12H  nystatin, , BID  omeprazole (PRILOSEC) suspension 2 mg/mL, 20 mg, Daily  polyethylene glycol, 17 g, Daily  sodium chloride, 2 spray, BID  sodium chloride, 4 mL, TID  sulfamethoxazole-trimethoprim, 1 tablet, Once per day on       acetaminophen, 650 mg, Q6H PRN  fentaNYL, 50 mcg, Q1H PRN  lidocaine 1% buffered, , PRN  ondansetron, 4 mg, Q6H PRN  sodium chloride, 1 Application, Q1H PRN       Continuous    dexmedetomidine, 0.1-0.7 mcg/kg/hr, Last Rate: Stopped (24 1422)         Labs:    CBC    Recent Labs     24  0446 24  1652 04/10/24  0543   WBC 19.91* 18.56* 19.87*   HGB 8.9* 8.7* 9.5*   HCT 26.8* 25.0* 27.8*   PLT 54* 39* 46*   BANDSPCT 12* 30*  --      BMP    Recent Labs     24  0446   SODIUM 139   K 4.4      CO2 19*   AGAP 14*   BUN 60*   CREATININE 1.02   CALCIUM 10.2       Coags    Recent Labs     24  0446   INR 1.40*        Additional Electrolytes  Recent Labs     24  0446   MG 1.9   PHOS 5.4*          Blood Gas    Recent Labs     24  1158    PHART 7.339*   CPA6YLD 37.8   PO2ART 92.8   RLO1PZQ 19.9*   BEART -5.4   SOURCE Radial, Right     Recent Labs     04/08/24  1155 04/10/24  0545   PHVEN  --  7.275*   GQU1VYB  --  40.2*   PO2VEN  --  56.1*   HBF2TRK  --  18.3*   BEVEN  --  -8.0   H9BILOY  --  86.5*   SOURCE Radial, Right  --     LFTs  Recent Labs     04/09/24  0446   ALT 35   AST 23   ALKPHOS 759*   ALB 2.4*   TBILI 2.09*       Infectious  No recent results  Glucose  Recent Labs     04/09/24  0446   GLUC 273*               Livan Morfin, DO

## 2024-04-10 NOTE — PROGRESS NOTES
This  returned to pt's room to provide pt and  w a bluetooth speaker to play pt's favorite music and a journal for  for processing.  offered comfort and support to pt's , Regan, and pt's friend, Sumi.  Spiritual Care remains available.

## 2024-04-10 NOTE — ASSESSMENT & PLAN NOTE
Regan continues to be consistently present and invested in patient's care.   Also supported by her daughter, son, and robust  group of friends, brother, and natalia community  Decisional apparatus:  Patient is not competent on my exam today.  If competence is lost, patient's substitute decision maker would default to spouse Min by [place] Act 169.  Advance Directive / Living Will / POLST:  None on file, unable to complete  Palliative care SORAYA continues to follow  Pastoral care following for support

## 2024-04-10 NOTE — ASSESSMENT & PLAN NOTE
"Code Status: full - Level 1  Ongoing medical optimization without limits at this time. Regan awaits any improvement in mental status. If no improvement will reconsider GOC. He casually mentions time frame of \"a week or so\".   While Regan remains hopeful for gradual improvement and stability to be d/c to rehab, he is also open to goals and appreciates honest information from treatment teams. He is feeling less optimistic about her recovery following diagnosis of fungemia and mental status change.  Continue to address goals of care at spouse's pace  "

## 2024-04-10 NOTE — UTILIZATION REVIEW
"Continued Stay Review    Date: 04/10                          Current Patient Class: IP  Current Level of Care: Level 2 stepdown/HOT    HPI:59 y.o. female initially admitted on 01/10     Assessment/Plan: Pt s/p IVC filter placement by IR yesterday w/o complications. Underwent  CT CAP yesterday w/c showed fluid collection associated with the complicated ostomy wound as well as hemoperitoneum. Per surg, this may not represent hemoperitoneum rather purulent material (regarding the fluid collection and free fluid in the abdomen) concerning for infection related to her ostomy.  IR consulted for drainage of this. CTH yesterday revealed small posterior IPH, concern or decreased attenuation of the left hemisphere. Plan for MRI today.    Pt febrile w/ T 100.7. . Remain Trached on MV, on PS wean. Cont Zosyn, diflucan, Micocycline. daily assessment for diuresis- give IV Lasix 40 mg today.  SSI, goal BS<180. TF. Hold sedating meds.  PE: Pt awake, opening eyes  but not moving purposefully or respond to commands     Vital Signs: /70   Pulse (!) 114   Temp (!) 100.7 °F (38.2 °C) (Probe)   Resp (!) 29   Ht 5' 8\" (1.727 m)   Wt 86.2 kg (190 lb 0.6 oz)   SpO2 100%   BMI 28.89 kg/m²       Pertinent Labs/Diagnostic Results:   04/10  RUQ US  The common bile duct is not dilated. Mild perihepatic ascites.     Shadowing calculi in the right kidney     Gallbladder sludge with wall thickening but no gallstones or sonographic Bains's sign wall thickening can be associated with hepatic pathology. Correlation with any right upper quadrant pain is advised..     Mild perihepatic ascites    MRI Brain:  As seen on the MRI from 4 days ago, there are small scattered infarcts within the cerebral hemispheres bilaterally suggesting a central embolic source.     Small new infarcts are seen within the left inferior parietal lobe with a small acute hemorrhage noted, as seen on yesterday's CT scan. Findings are suggestive of additional " small infarcts with focal hemorrhagic transformation.     Stable postoperative appearance of the middle cranial fossa on the left status post temporal lobectomy.     Complete opacification of the mastoid temporal bones bilaterally is again seen.      Results from last 7 days   Lab Units 04/10/24  0543 04/09/24  1652 04/09/24  0446 04/09/24  0157 04/08/24  1759   WBC Thousand/uL 19.87* 18.56* 19.91* 19.32* 18.52*   HEMOGLOBIN g/dL 9.5* 8.7* 8.9* 8.8* 5.7*   HEMATOCRIT % 27.8* 25.0* 26.8* 26.9* 18.0*   PLATELETS Thousands/uL 46* 39* 54* 58* 32*   BANDS PCT % 18* 30* 12* 10* 7         Results from last 7 days   Lab Units 04/10/24  0859 04/10/24  0543 04/09/24  0446 04/08/24  0505 04/07/24  1834 04/07/24  0508 04/06/24  1814 04/06/24  0503 04/06/24  0100 04/06/24  0031 04/05/24  1841   SODIUM mmol/L  --  141 139 141 137 138   < > 137   < >  --  141   POTASSIUM mmol/L  --  3.7 4.4 4.4 4.4 4.9   < > 4.8   < >  --  4.7   CHLORIDE mmol/L  --  107 106 107 106 105   < > 104   < >  --  108   CO2 mmol/L  --  20* 19* 22 21 21   < > 20*   < >  --  20*   ANION GAP mmol/L  --  14* 14* 12 10 12   < > 13   < >  --  13   BUN mg/dL  --  62* 60* 55* 52* 46*   < > 40*   < >  --  51*   CREATININE mg/dL  --  1.13 1.02 0.86 0.84 0.79   < > 0.62   < >  --  0.76   EGFR ml/min/1.73sq m  --  53 60 74 76 82   < > 99   < >  --  86   CALCIUM mg/dL  --  10.2 10.2 10.2 10.0 10.2   < > 10.0   < >  --  9.8   CALCIUM, IONIZED mmol/L 1.45*  --   --  1.46*  --   --   --  1.43*  --  1.41* 1.41*   MAGNESIUM mg/dL  --  1.9 1.9 2.0  --  1.9  --  2.0   < >  --  1.9   PHOSPHORUS mg/dL  --  5.7* 5.4* 4.8*  --  4.6*  --  3.9   < >  --  4.5    < > = values in this interval not displayed.     Results from last 7 days   Lab Units 04/10/24  0543 04/09/24  0446 04/07/24  0508 04/05/24  0522 04/04/24  0752   AST U/L 24 23 24 20 21   ALT U/L 42 35 37 32 34   ALK PHOS U/L 918* 759* 900* 346* 381*   TOTAL PROTEIN g/dL 4.2* 4.3* 4.3* 5.2* 4.7*   ALBUMIN g/dL 2.3* 2.4*  2.3* 2.4* 2.2*   TOTAL BILIRUBIN mg/dL 1.85* 2.09* 1.50* 1.42* 1.46*   BILIRUBIN DIRECT mg/dL  --  1.37* 1.00* 0.88* 0.96*     Results from last 7 days   Lab Units 04/10/24  1203 04/10/24  0833 04/10/24  0553 04/09/24  2334 04/09/24  1707 04/09/24  1146 04/09/24  1023 04/09/24  0734 04/09/24  0553 04/09/24  0154 04/09/24  0016 04/08/24  2000   POC GLUCOSE mg/dl 248* 202* 180* 171* 158* 162* 182* 254* 257* 159* 201* 154*     Results from last 7 days   Lab Units 04/10/24  0543 04/09/24  0446 04/08/24  0505 04/07/24  1834 04/07/24  0508 04/06/24  1814 04/06/24  0503 04/06/24  0100 04/05/24  1841 04/05/24  1158 04/05/24  0522 04/04/24  2312   GLUCOSE RANDOM mg/dL 166* 273* 107 187* 249* 203* 144* 140 127 98 92 73             Beta- Hydroxybutyrate   Date Value Ref Range Status   04/09/2024 1.28 (H) 0.02 - 0.27 mmol/L Final   04/03/2024 1.10 (H) 0.02 - 0.27 mmol/L Final      Results from last 7 days   Lab Units 04/08/24  1155   PH ART  7.339*   PCO2 ART mm Hg 37.8   PO2 ART mm Hg 92.8   HCO3 ART mmol/L 19.9*   BASE EXC ART mmol/L -5.4   O2 CONTENT ART mL/dL 9.6*   O2 HGB, ARTERIAL % 95.5   ABG SOURCE  Radial, Right     Results from last 7 days   Lab Units 04/10/24  0545 04/04/24  0647 04/04/24  0129   PH NICA  7.275* 7.284* 7.285*   PCO2 NICA mm Hg 40.2* 38.5* 37.1*   PO2 NICA mm Hg 56.1* 64.3* 87.6*   HCO3 NICA mmol/L 18.3* 17.8* 17.2*   BASE EXC NICA mmol/L -8.0 -8.1 -8.7   O2 CONTENT NICA ml/dL 12.3 10.2 10.8   O2 HGB, VENOUS % 86.5* 90.3* 94.7*         Results from last 7 days   Lab Units 04/05/24  1158   CK TOTAL U/L 25*             Results from last 7 days   Lab Units 04/09/24  0446 04/08/24  0505 04/07/24  0508 04/04/24  0425 04/04/24  0118   PROTIME seconds 17.0* 16.4* 17.0*   < > 21.6*   INR  1.40* 1.34* 1.40*   < > 1.92*   PTT seconds  --   --   --   --  40*    < > = values in this interval not displayed.     Results from last 7 days   Lab Units 04/06/24  0503   TSH 3RD GENERATON uIU/mL 0.017*     Results from last 7  days   Lab Units 04/05/24  0522   PROCALCITONIN ng/ml 5.61*     Results from last 7 days   Lab Units 04/10/24  0858 04/09/24  1037 04/04/24  2312 04/04/24  1001 04/04/24  0118   LACTIC ACID mmol/L 0.9 2.3* 0.7 1.1 1.7                         Results from last 7 days   Lab Units 04/09/24  0555 04/05/24  0555 04/04/24  0555   UNIT PRODUCT CODE  [id number]  [id number]  [id number]  B9140U47  A3847P63 [id number]   UNIT NUMBER  [id number]  [id number]  [id number]  [id number]  [id number]   UNITABO  A  A  O A  A  A A   UNITRH  NEG  NEG  POS NEG  POS  NEG NEG   CROSSMATCH  Compatible  Compatible Compatible  Compatible  --    UNIT DISPENSE STATUS  Presumed Trans  Presumed Trans  Presumed Trans Presumed Trans  Presumed Trans  Presumed Trans Presumed Trans   UNIT PRODUCT VOL mL 350  350  300 350  250  350 250             Results from last 7 days   Lab Units 04/07/24  0508   CRP mg/L 165.6*   SED RATE mm/hour 48*             Results from last 7 days   Lab Units 04/06/24  0021 04/04/24  0129   CLARITY UA  Extra Turbid Turbid   COLOR UA  Yellow Yellow   SPEC GRAV UA  1.024 1.017   PH UA  6.0 6.0   GLUCOSE UA mg/dl Negative Negative   KETONES UA mg/dl Trace* Negative   BLOOD UA  Large* Large*   PROTEIN UA mg/dl 200 (2+)* 70 (1+)*   NITRITE UA  Negative Negative   BILIRUBIN UA  Negative Negative   UROBILINOGEN UA (BE) mg/dl <2.0 <2.0   LEUKOCYTES UA  Large* Moderate*   WBC UA /hpf Innumerable* Innumerable*   RBC UA /hpf Innumerable* Innumerable*   BACTERIA UA /hpf Moderate* None Seen   EPITHELIAL CELLS WET PREP /hpf None Seen None Seen         Results from last 7 days   Lab Units 04/10/24  0542 04/09/24  0631 04/09/24  0447 04/06/24  0453 04/06/24  0021 04/04/24  0120   BLOOD CULTURE  Received in Microbiology Lab. Culture in Progress.  Received in Microbiology Lab. Culture in Progress. No Growth at 24 hrs. No Growth at 24 hrs. No Growth After 4 Days.   Candida albicans*  --  Candida albicans*  No Growth After 5 Days.   GRAM STAIN RESULT   --   --   --  Budding Yeast with Pseudomycelia*  --  Yeast*   URINE CULTURE   --   --   --   --  >100,000 cfu/ml Candida albicans*  --                    Medications:   Scheduled Medications:  chlorhexidine, 15 mL, Mouth/Throat, Q12H SARTHAK  fluconazole, 800 mg, Intravenous, Q24H  furosemide, 40 mg, Intravenous, Once  insulin lispro, 1-6 Units, Subcutaneous, Q6H SARTHAK  lacosamide, 100 mg, Per NG Tube, Q12H SARTHAK  metoprolol tartrate, 12.5 mg, Per NG Tube, Q12H SARTHAK  minocycline, 200 mg, Per NG Tube, Q12H  nystatin, , Topical, BID  omeprazole (PRILOSEC) suspension 2 mg/mL, 20 mg, Per NG Tube, Daily  piperacillin-tazobactam, 4.5 g, Intravenous, Q8H  polyethylene glycol, 17 g, Per NG Tube, Daily  sodium chloride, 2 spray, Each Nare, BID  sodium chloride, 4 mL, Nebulization, TID  sulfamethoxazole-trimethoprim, 1 tablet, Per NG Tube, Once per day on Monday Wednesday Friday      Continuous IV Infusions:  dexmedetomidine, 0.1-0.7 mcg/kg/hr, Intravenous, Titrated- off      PRN Meds:  acetaminophen, 650 mg, Oral, Q6H PRN  fentaNYL, 50 mcg, Intravenous, Q1H PRN  lidocaine 1% buffered, , Infiltration, PRN  ondansetron, 4 mg, Intravenous, Q6H PRN  sodium chloride, 1 Application, Nasal, Q1H PRN        Discharge Plan: [facility]    Network Utilization Review Department  ATTENTION: Please call with any questions or concerns to [phone] and carefully listen to the prompts so that you are directed to the right person. All voicemails are confidential.   For Discharge needs, contact Care Management DC Support Team at [phone] opt. 2  Send all requests for admission clinical reviews, approved or denied determinations and any other requests to dedicated fax number below belonging to the campus where the patient is receiving treatment. List of dedicated fax numbers for the Facilities:  FACILITY NAME UR FAX NUMBER   ADMISSION DENIALS  (Administrative/Medical Necessity) [phone]   DISCHARGE SUPPORT TEAM (NETWORK) [phone]   PARENT CHILD HEALTH (Maternity/NICU/Pediatrics) [phone]   [facility] [phone]   General acute hospital [phone]   [facility] [phone]   [facility] [phone]   [facility] [phone]   [facility] [phone]   [facility] [phone]   [facility] [phone]   [facility] [phone]   [facility] [phone]   [facility] [phone]   [facility] [phone]

## 2024-04-10 NOTE — SOCIAL WORK
The Palliative SW met with the pt  at bedside.  The pt is opening her eyes.  She is not able to follow any commands.  The pt  was feeling discouraged.  He was informed the pt will likely not be able to ever return home.  The pt  appreciates the honesty.  However he is having some spiritual conflict.  He states that his wife does not deserve this.      The pt has a low grade fever and was expected to go to IR in the AM tomorrow.  The pt was seen medically and the pt will be seen by IR today.  The pt  and daughter were able to review her scans with the provider and their was a discussion about the pt ostomy.  The pt  expressed his concerns and they were validated.  The pt  was hopeful the pt would be seen by IR today.      The pt  will continue to be supported by the team and a request for  support daily     I have spent 85 minutes with Patient and family today in which greater than 50% of this time was spent in counseling/coordination of care     Palliative  will follow-up as requested by patient, family, and primary team.  Please contact with any specific requests

## 2024-04-10 NOTE — ASSESSMENT & PLAN NOTE
Multi-specialty team treating individual factors that may contribute towards AMS. Provigil held at this time.  Identified to have fungemia, treatment started. CRRT initiated on 4/4 (since d/c as no improvement with mental status)  Unfortunately, Carla remains encephalopathic, not interactive during time of encounter.  CT head done 4/9/2024 revealed possibility of new hemorrhagic stroke--> MRI brain done 4/10/2024 revealed numerous new infarcts and confirmed small hemorrhagic area

## 2024-04-10 NOTE — PROGRESS NOTES
Progress note - Palliative and Supportive Care   Carla Hunt 59 y.o. female [id number]    Patient Active Problem List   Diagnosis    Rosacea    Anxiety state    Disorder of parathyroid gland (HCC)    Eczema    Grand mal status (HCC)    Hypercalcemia    Hypertensive disorder    Long term current use of hormonal contraceptive    Open wound of hand except fingers    Otitis externa    Overweight    Pain in face    Skin sensation disturbance    Subjective visual disturbance    Hidradenitis suppurativa of left axilla    Other specified anxiety disorders    Reactive depression    Encephalopathy    Nephrolithiasis    COVID    Stage 3b chronic kidney disease (CKD) (HCC)    Abnormal CT of the head    Acute respiratory failure with hypoxia (HCC)    Transaminitis    Teto marginal zone B-cell lymphoma (HCC)    Seizure disorder (HCC)    Hypotension    Palliative care patient    Goals of care, counseling/discussion    Acute kidney injury (HCC)    Cecal volvulus (HCC)    Drowsiness    Urinary retention    Fungemia    Pancytopenia (HCC)    Gross hematuria    Cerebrovascular accident (CVA) due to embolism (HCC)     Assessment/plan:  Encephalopathy  Assessment & Plan  Multi-specialty team treating individual factors that may contribute towards AMS. Provigil held at this time.  Identified to have fungemia, treatment started. CRRT initiated on 4/4  Unfortunately, Carla remains encephalopathic, not interactive during time of encounter.  CT head done 4/9/2024 revealed possibility of new hemorrhagic stroke--> MRI brain done 4/10/2024 revealed numerous new infarcts and confirmed small hemorrhagic area    * Acute respiratory failure with hypoxia (HCC)  Assessment & Plan  Admitted, severe COVID course complicated by incidences of pneumonia  Patient was re-intubated 3/11 with subsequent bedside trach on 3/12.   Very careful steroid wean  per critical care team.   Patient back on ventilator support, previously on trach collar and using  "PMV at her best    Palliative care patient  Assessment & Plan  Min continues to be consistently present and invested in patient's care.   Also supported by her daughter, son, and robust  group of friends, brother, and nat[place] community  Decisional apparatus:  Patient is not competent on my exam today.  If competence is lost, patient's substitute decision maker would default to spouse Min by [place].  Advance Directive / Living Will / POLST:  None on file, unable to complete  Palliative care MSW continues to follow  Spent time today with pastoral care  with supportive listening, presence, and provided space for life review    Goals of care, counseling/discussion  Assessment & Plan  Code Status: full - Level 1  Ongoing medical optimization without limits at this time. Min has begun to question \"When enough is enough\". He is agreeable to current plan of 4-5 more days of medical management, awaiting any improvement in mental status with initiation of CRRT, investigating enlarging thyroid. However, he is open to revisit goals of care if no improvement by mid-week or if more complicating events arise sooner.   While Min remains hopeful for gradual improvement and stability to be d/c to rehab, he is also open to goals and appreciates honest information from treatment teams.  Continue to address goals of care at spouse's pace         NARRATIVE AND INTERVAL HISTORY:       Present at patient's bedside with spouse and  Sara Weber.  Patient recently found to have new brain bleed, and ICU staff requested support for spouse.  Please see pastoral care 's note today for more details.  No counseling on medical information or goals of care at this encounter.    MEDICATIONS / ALLERGIES:     all current active meds have been reviewed, current meds:   Current Facility-Administered Medications   Medication Dose Route Frequency    acetaminophen (TYLENOL) tablet 650 mg  650 mg Oral Q6H PRN    " chlorhexidine (PERIDEX) 0.12 % oral rinse 15 mL  15 mL Mouth/Throat Q12H SARTHAK    dexmedeTOMIDine (Precedex) 400 mcg in sodium chloride 0.9% 100 mL  0.1-0.7 mcg/kg/hr Intravenous Titrated    fentaNYL injection 50 mcg  50 mcg Intravenous Q1H PRN    fluconazole (DIFLUCAN) (HIGH DOSE) IVPB 800 mg  800 mg Intravenous Q24H    insulin lispro (HumALOG/ADMELOG) 100 units/mL subcutaneous injection 1-6 Units  1-6 Units Subcutaneous Q6H [facility]    lacosamide (VIMPAT) tablet 100 mg  100 mg Per NG Tube Q12H [facility]    lidocaine 1% buffered   Infiltration PRN    metoprolol tartrate (LOPRESSOR) partial tablet 12.5 mg  12.5 mg Per NG Tube Q12H SARTHAK    minocycline (DYNACIN) tablet 200 mg  200 mg Per NG Tube Q12H    nystatin (MYCOSTATIN) powder   Topical BID    omeprazole (PRILOSEC) suspension 2 mg/mL  20 mg Per NG Tube Daily    ondansetron (ZOFRAN) injection 4 mg  4 mg Intravenous Q6H PRN    piperacillin-tazobactam (ZOSYN) 4.5 g in sodium chloride 0.9 % 100 mL IVPB (EXTENDED INFUSION)  4.5 g Intravenous Q8H    polyethylene glycol (MIRALAX) packet 17 g  17 g Per NG Tube Daily    sodium chloride (AYR SALINE NASAL) nasal gel 1 Application  1 Application Nasal Q1H PRN    sodium chloride (OCEAN) 0.65 % nasal spray 2 spray  2 spray Each Nare BID    sodium chloride 3 % inhalation solution 4 mL  4 mL Nebulization TID    sulfamethoxazole-trimethoprim (BACTRIM DS) 800-160 mg per tablet 1 tablet  1 tablet Per NG Tube Once per day on Monday Wednesday Friday   , and PTA meds:   Prior to Admission Medications   Prescriptions Last Dose Informant Patient Reported? Taking?   busPIRone (BUSPAR) 5 mg tablet   Yes No   Sig: Take 5 mg by mouth 2 (two) times a day   escitalopram (LEXAPRO) 10 mg tablet   Yes No   ibuprofen (MOTRIN) 600 mg tablet   No No   Sig: Take 1 tablet (600 mg total) by mouth every 6 (six) hours as needed for mild pain for up to 10 days   Patient not taking: Reported on 5/16/2023   lamoTRIgine (LaMICtal) 200 MG tablet   No No   Sig: Take 1  tablet (200 mg total) by mouth 2 (two) times a day   lamoTRIgine (LaMICtal) 200 MG tablet   No No   Si tablet by mouth twice per day.   ondansetron (ZOFRAN-ODT) 4 mg disintegrating tablet  Self No No   Sig: Take 1 tablet (4 mg total) by mouth every 8 (eight) hours as needed for nausea or vomiting   Patient not taking: Reported on 2020   topiramate (TOPAMAX) 100 mg tablet   No No   Sig: Take 1 tablet (100 mg total) by mouth 2 (two) times a day   topiramate (TOPAMAX) 100 mg tablet   No No   Sig: Take 1 tablet (100 mg total) by mouth 2 (two) times a day   venlafaxine (EFFEXOR-XR) 75 mg 24 hr capsule   Yes No   Sig: Take 75 mg by mouth daily      Facility-Administered Medications: None       No Known Allergies    OBJECTIVE:    Physical Exam  Physical Exam  Constitutional:       General: She is not in acute distress.     Appearance: She is ill-appearing.   HENT:      Head: Normocephalic and atraumatic.      Right Ear: External ear normal.      Left Ear: External ear normal.      Nose: Nose normal.      Mouth/Throat:      Mouth: Mucous membranes are moist.      Pharynx: Oropharynx is clear.   Eyes:      Conjunctiva/sclera: Conjunctivae normal.   Neck:      Trachea: Tracheostomy present.   Cardiovascular:      Rate and Rhythm: Normal rate and regular rhythm.   Pulmonary:      Effort: Pulmonary effort is normal. No respiratory distress.   Skin:     Coloration: Skin is pale. Skin is not jaundiced.   Neurological:      Mental Status: She is unresponsive.      Comments: Unable to follow commands, sometimes tracks with eyes         Lab Results: I have personally reviewed pertinent labs., CBC:   Lab Results   Component Value Date    WBC 19.87 (H) 04/10/2024    HGB 9.5 (L) 04/10/2024    HCT 27.8 (L) 04/10/2024    MCV 86 04/10/2024    PLT 46 (L) 04/10/2024    RBC 3.23 (L) 04/10/2024    MCH 29.4 04/10/2024    MCHC 34.2 04/10/2024    RDW 18.6 (H) 04/10/2024    NRBC 15 (H) 04/10/2024   , CMP:   Lab Results   Component Value  "Date    SODIUM 141 04/10/2024    K 3.7 04/10/2024     04/10/2024    CO2 20 (L) 04/10/2024    BUN 62 (H) 04/10/2024    CREATININE 1.13 04/10/2024    CALCIUM 10.2 04/10/2024    AST 24 04/10/2024    ALT 42 04/10/2024    ALKPHOS 918 (H) 04/10/2024    EGFR 53 04/10/2024   , BMP:  Lab Results   Component Value Date    SODIUM 141 04/10/2024    K 3.7 04/10/2024     04/10/2024    CO2 20 (L) 04/10/2024    BUN 62 (H) 04/10/2024    CREATININE 1.13 04/10/2024    GLUC 166 (H) 04/10/2024    CALCIUM 10.2 04/10/2024    AGAP 14 (H) 04/10/2024    EGFR 53 04/10/2024   , PT/PTT:No results found for: \"PT\", \"PTT\"  Imaging Studies: Reviewed and interpreted  CT chest abdomen pelvis 4/9/2024  CT head without contrast 4/9/24  US RUQ 4/10/24  MRI brain 4/10/24  EKG, Pathology, and Other Studies: Reviewed and interpreted    Counseling / Coordination of Care    Total floor / unit time spent today 60 minutes. Greater than 50% of total time was spent with the patient and / or family counseling and / or coordination of care. A description of the counseling / coordination of care: Chart review, patient interview/discussion, psychosocial support, comprehensive symptom evaluation, physical examination, medication evaluation/alteration, advance care/goals-of-care planning, and multidisciplinary collaboration.    "

## 2024-04-10 NOTE — TELEPHONE ENCOUNTER
IR Clinic Follow-Up:    Patient clinical visit in 3 month.    Schedule visit for the first available IR Physician: No                *If no, schedule for:   IR Physician: Ac    Type of Visit: Virtual Brief Visit - Telephone    Additional Details:   Re-assess filter removal.  Have doc chart check before scheduling appointment.

## 2024-04-10 NOTE — QUICK NOTE
I updated the  on the MRI brain findings, fever/bandemia, and surgical recommendations for IR drainage of complex fluid in the pelvis.

## 2024-04-10 NOTE — PROGRESS NOTES
"   04/10/24 1400   Clinical Encounter Type   Visited With Patient and family together;Family;Health care provider   Referral From Physician   Referral To      This  and palliative physician, Dr Morelos, met w pt and pt's , Regan, per request of palliative team. This  offered active listening, grief support, and compassionate presence to Regan. Regan is bereft and angry about his wife's decline; Regan appears to be experiencing complicated grief. Regan states he feels 'guilty' for \"putting\" pt through the last 3 months of intense medical interventions as he feels it was 'pointless.' Pt's dtr will  in the summer and family is grieved by possibility that pt will not make it to the wedding.  offered spiritual support and safe space for Regan to process theological issues and anger at God; Regan believes 'God obviously loves to punish people. She doesn't deserve this. She was a good wife and a good Mom.\"  encouraged Regan to keep feeling his emotions and releasing them, even the very difficult emotions.  suggested emotional processing techniques such as journaling and somatic release practices. Regan appreciative of support. Spiritual Care remains available.   "

## 2024-04-10 NOTE — QUICK NOTE
IR NOTE    59-year-old with complex postoperative course, sepsis    Altered mental status    Numerous discussions between multiple services    Plan today is for  -Attempted pelvic drain placement.  There is a fluid collection in the deep pelvis on CT.  It is unclear if this is free fluid.  If it is free fluid it we will lay her out when repositioning and drain placement is not possible    -Lumbar puncture.  There remains altered mental status.  She has candidemia evidence of septic emboli and history of lymphoma.  We will send fluid for multiple studies    I discussed the above procedures with the patient's  and both written and verbal consent were obtained    Patient was examined at bedside.  She has a cuffed tracheostomy and a wound VAC on the abdomen.    We will proceed with CT guidance and if required we will move her from CT to fluoroscopy    She is elevated risk for multiple reasons  -Thrombocytopenia, we have given platelets.  This increases risk of bleeding for both procedures  -Proning her, she has fragile respiratory status and low reserve  -Abdominal wound, although this is protected by wound VAC we will pad the area..    Appreciate anesthesia support

## 2024-04-10 NOTE — PROGRESS NOTES
Progress Note - Cardiology   Carla Hunt 59 y.o. female MRN: [id number]  [facility]: [id number]    Assessment:    CVA due to multiple bilateral foci of acute infarct   - MRI on 4/6/2024 demonstrated multiple small foci of acute infarcts with no evidence of edema, mass effect or midline shift  - Repeat MRI this morning demonstrated small scattered infarcts with new infarct seen in the left inferior parietal lobe with small acute hemorrhage noted as seen on CT from 4/9/2024  - TTE on 4/7/2024 demonstrated right to left shunting using saline contrast at rest and with provocation which could be related to PFO versus intrapulmonary shunt.  No evidence of valvular vegetations   - given patient's high burden of infection there is concern for septic emboli and patient likely has PFO so possibility of paradoxical embolism cannot be excluded  - Patient has been monitored on telemetry throughout hospitalization without evidence of atrial fibrillation  - Neurology has been consulted and appreciate recommendations.   - Plan for SATURNINO today    Candidemia   - 2 sets of blood cultures collected on 3/31/2024 growing Candida albicans susceptible to fluconazole  - Risk factors include midline abdominal surgery, prolonged hospitalization and ICU, multiple courses of broad-spectrum antibiotics, leukopenia and recent neutropenia  - ID on board and appreciate recommendations.  - TTE demonstrated no evidence of valvular vegetations and ophthalmology evaluation with no evidence of endophthalmitis  - MRI brain with bilateral infarcts and possible septic emboli  - Continues on fluconazole 800 mg daily  - now with fluid collection in pelvis. Plan for IR drainage.   - SATURNINO pending for today    Sinus tachycardia   - In the setting of fever, infection, possible pain  - Pain and infection treatment per primary team  - Continue treating underlying etiology  - No indication for rate controlling medication    Recurrent  stenotrophomonas pneumonia   - Continues on minocycline 200 mg twice daily  - Defer treatment to primary team and ID    Severe metabolic encephalopathy   - Waxing and waning neuroexam since admission  - CT head from 4/9/2024 demonstrated small amount of hemorrhage within the left parietal lobe, low-attenuation involving the left cerebral hemisphere, may represent technical secondary to patient tilt versus developing infarct.  MRI recommended for further evaluation  - MRI today demonstrated new infarcts in the left inferior parietal lobe with small acute hemorrhage noted as well as previously seen scattered infarcts.  - May also be component of uremic encephalopathy as well as infection with fungemia  - Defer to neurology and primary team for management    Uremia   - Now off CRRT  - Management per primary team and nephrology    Acute cecal volvulus post hemicolectomy, colostomy 2/20/2024 complicated by poor wound healing status, post OR washout 4/4/2024 with surgically created mucous fistula and end ileostomy  - Management per primary team and surgery    Esophagitis  - Status post EGD on 4/2/2024 and 4/3/2024  - EGD on 4/2/2024 demonstrated ulcerated esophageal tear 15 cm from incisors with slow oozing initially after clot cleared with cessation of hemorrhage by the end of exam, blood and extensive clots in the esophagus and stomach as well as multiple ulcers and trauma throughout the stomach with clean base.  - Biopsies obtained and were negative for CMV and HSV  - Repeat EGD 4/3/2024 demonstrated ulcerated mucosa in the upper third of the esophagus without evidence of hemorrhage, mild esophagitis, blood clotting in the body of the stomach and antrum  - Patient remains on PPI  - Discussed with GI yesterday regarding SATURNINO however given that patient has been on treatment and there was improvement there will be no contraindication to proceeding with SATURNINO from GI perspective    Pancytopenia   - Status post filgrastim x 3  "and intermittent platelet and PRBC transfusions  - In the setting of B-cell lymphoma, prior chemo, Rituxan therapy, sepsis  - Lamictal was also switched to Vimpat due to potential consideration to pancytopenia  - Management per primary team  - Platelets 46 today    Acute on chronic anemia   - Patient has received multiple blood transfusions throughout hospitalization had significant blood loss from ostomy site on 2 different occasions  - EGD 4/2/2024 and 4/4/2024 without active bleeding  - Hemoglobin has remained stable  - defer to primary team for management     CKD III  - Initially presented with ELIESER which was likely component of ATN, Bactrim use, GI bleeding and initially required CRRT however now being monitored off  - Creatinine 1.13 today with 1925 cc of urine output over the last 24 hours and net negative 897 cc  - Defer management to primary team and nephrology    B cell lymphoma status post chemotherapy in 2022 with Rituxan maintenance therapy currently on hold   Seizure disorder     Plan:  - Will proceed with SATURNINO today however low threshold to abort procedure if any issues or concerns      Subjective/Objective     Subjective: Yesterday afternoon patient had CT head that was concerning for hypoattenuation of the left side.  Left MRI this morning.  Patient was examined at the bedside today and her eyes are open; however she does not participate in exam.     Objective:     Vitals: /56   Pulse (!) 109   Temp (!) 100.6 °F (38.1 °C)   Resp (!) 27   Ht 5' 8\" (1.727 m)   Wt 86.2 kg (190 lb 0.6 oz)   SpO2 98%   BMI 28.89 kg/m²   Vitals:    04/09/24 0534 04/10/24 0555   Weight: 84.7 kg (186 lb 11.7 oz) 86.2 kg (190 lb 0.6 oz)     Orthostatic Blood Pressures      Flowsheet Row Most Recent Value   Blood Pressure 103/56 filed at 04/10/2024 1000   Patient Position - Orthostatic VS Lying filed at 04/10/2024 0400              Intake/Output Summary (Last 24 hours) at 4/10/2024 1046  Last data filed at " 4/10/2024 1000  Gross per 24 hour   Intake 1295.67 ml   Output 2185 ml   Net -889.33 ml       Invasive Devices       Peripheral Intravenous Line  Duration             Peripheral IV 04/08/24 Proximal;Right;Ventral (anterior) Forearm 1 day    Peripheral IV 04/09/24 Left;Proximal;Ventral (anterior) Forearm 1 day              Drain  Duration             Ileostomy RUQ 49 days    Urethral Catheter Three way 18 Fr. 9 days    NG/OG/Enteral Tube Nasogastric 18 Fr Right nare 6 days              Airway  Duration             Surgical Airway Shiley Cuffed 28 days                    Review of Systems: Unable to obtain due to patient mental status.    Physical Exam:   General Appearance:  Eyes are open but patient does not participate with exam.  Ill-appearing female.   Head:    Normocephalic, without obvious abnormality, atraumatic   Eyes:    PERRL, conjunctiva/corneas clear, EOM's intact      Neck:   Supple, symmetrical, trachea midline, no JVD however difficult to evaluate   Lungs:    Coarse breath sounds throughout.  Tracheostomy in place on ventilator.   Chest wall:    No tenderness or deformity.    Heart:  Tachycardic, regular rhythm.  S1 and S2 normal, no murmur, rub or gallop   Abdomen:     Soft, non-tender, bowel sounds active all four quadrants   Extremities:   Extremities normal, atraumatic, no cyanosis.  1+ bilateral lower extremity edema   Pulses:   2+ and symmetric all extremities   Neurologic:  Eyes open but patient does not participate in exam.        Lab Results: I have personally reviewed pertinent lab results.    CBC with diff:   Results from last 7 days   Lab Units 04/10/24  0543 04/09/24  1652 04/09/24  0446   WBC Thousand/uL 19.87*   < > 19.91*   RBC Million/uL 3.23*   < > 3.04*   HEMOGLOBIN g/dL 9.5*   < > 8.9*   HEMATOCRIT % 27.8*   < > 26.8*   MCV fL 86   < > 88   MCH pg 29.4   < > 29.3   MCHC g/dL 34.2   < > 33.2   RDW % 18.6*   < > 16.5*   MPV fL  --   --  11.5   PLATELETS Thousands/uL 46*   < > 54*    <  > = values in this interval not displayed.     CMP:   Results from last 7 days   Lab Units 04/10/24  0543   SODIUM mmol/L 141   CHLORIDE mmol/L 107   CO2 mmol/L 20*   BUN mg/dL 62*   CREATININE mg/dL 1.13   CALCIUM mg/dL 10.2   AST U/L 24   ALT U/L 42   ALK PHOS U/L 918*   EGFR ml/min/1.73sq m 53     HS Troponin:   0   Lab Value Date/Time    HSTNI 66 (H) 02/25/2024 2301    HSTNI0 60 (H) 03/09/2024 1358     BNP:   Results from last 7 days   Lab Units 04/10/24  0543   POTASSIUM mmol/L 3.7   CHLORIDE mmol/L 107   CO2 mmol/L 20*   BUN mg/dL 62*   CREATININE mg/dL 1.13   CALCIUM mg/dL 10.2   EGFR ml/min/1.73sq m 53     Coags:   Results from last 7 days   Lab Units 04/09/24  0446 04/04/24  0425 04/04/24  0118   PTT seconds  --   --  40*   INR  1.40*   < > 1.92*    < > = values in this interval not displayed.     TSH:   Results from last 7 days   Lab Units 04/06/24  0503   TSH 3RD GENERATON uIU/mL 0.017*     Magnesium:   Results from last 7 days   Lab Units 04/10/24  0543   MAGNESIUM mg/dL 1.9     Lipid Profile:     Imaging: I have personally reviewed pertinent reports.   and I have personally reviewed pertinent films in PACS

## 2024-04-11 LAB
1,25(OH)2D3 SERPL-MCNC: 23.1 PG/ML (ref 24.8–81.5)
ABO GROUP BLD BPU: NORMAL
ALBUMIN SERPL BCP-MCNC: 2.1 G/DL (ref 3.5–5)
ALP SERPL-CCNC: 786 U/L (ref 34–104)
ALT SERPL W P-5'-P-CCNC: 38 U/L (ref 7–52)
ANION GAP SERPL CALCULATED.3IONS-SCNC: 14 MMOL/L (ref 4–13)
ANISOCYTOSIS BLD QL SMEAR: PRESENT
ANISOCYTOSIS BLD QL SMEAR: PRESENT
AST SERPL W P-5'-P-CCNC: 21 U/L (ref 13–39)
BACTERIA BLD CULT: ABNORMAL
BACTERIA BLD CULT: NORMAL
BASOPHILS # BLD MANUAL: 0 THOUSAND/UL (ref 0–0.1)
BASOPHILS # BLD MANUAL: 0 THOUSAND/UL (ref 0–0.1)
BASOPHILS NFR MAR MANUAL: 0 % (ref 0–1)
BASOPHILS NFR MAR MANUAL: 0 % (ref 0–1)
BILIRUB SERPL-MCNC: 1.45 MG/DL (ref 0.2–1)
BPU ID: NORMAL
BUN SERPL-MCNC: 65 MG/DL (ref 5–25)
CALCIUM ALBUM COR SERPL-MCNC: 11.4 MG/DL (ref 8.3–10.1)
CALCIUM SERPL-MCNC: 9.9 MG/DL (ref 8.4–10.2)
CHLORIDE SERPL-SCNC: 111 MMOL/L (ref 96–108)
CK SERPL-CCNC: <10 U/L (ref 26–192)
CMV SPEC QL CULT: NORMAL
CO2 SERPL-SCNC: 19 MMOL/L (ref 21–32)
CREAT SERPL-MCNC: 1.19 MG/DL (ref 0.6–1.3)
DACRYOCYTES BLD QL SMEAR: PRESENT
EOSINOPHIL # BLD MANUAL: 0 THOUSAND/UL (ref 0–0.4)
EOSINOPHIL # BLD MANUAL: 0 THOUSAND/UL (ref 0–0.4)
EOSINOPHIL NFR BLD MANUAL: 0 % (ref 0–6)
EOSINOPHIL NFR BLD MANUAL: 0 % (ref 0–6)
ERYTHROCYTE [DISTWIDTH] IN BLOOD BY AUTOMATED COUNT: 19.5 % (ref 11.6–15.1)
ERYTHROCYTE [DISTWIDTH] IN BLOOD BY AUTOMATED COUNT: 20 % (ref 11.6–15.1)
GFR SERPL CREATININE-BSD FRML MDRD: 50 ML/MIN/1.73SQ M
GLUCOSE SERPL-MCNC: 116 MG/DL (ref 65–140)
GLUCOSE SERPL-MCNC: 120 MG/DL (ref 65–140)
GLUCOSE SERPL-MCNC: 128 MG/DL (ref 65–140)
GLUCOSE SERPL-MCNC: 132 MG/DL (ref 65–140)
GLUCOSE SERPL-MCNC: 132 MG/DL (ref 65–140)
GLUCOSE SERPL-MCNC: 145 MG/DL (ref 65–140)
GLUCOSE SERPL-MCNC: 155 MG/DL (ref 65–140)
HCT VFR BLD AUTO: 23.1 % (ref 34.8–46.1)
HCT VFR BLD AUTO: 24.2 % (ref 34.8–46.1)
HGB BLD-MCNC: 7.6 G/DL (ref 11.5–15.4)
HGB BLD-MCNC: 8.1 G/DL (ref 11.5–15.4)
HYPERCHROMIA BLD QL SMEAR: PRESENT
LG PLATELETS BLD QL SMEAR: PRESENT
LYMPHOCYTES # BLD AUTO: 0 % (ref 14–44)
LYMPHOCYTES # BLD AUTO: 0.3 THOUSAND/UL (ref 0.6–4.47)
LYMPHOCYTES # BLD AUTO: 0.49 THOUSAND/UL (ref 0.6–4.47)
LYMPHOCYTES # BLD AUTO: 3 % (ref 14–44)
MAGNESIUM SERPL-MCNC: 2 MG/DL (ref 1.9–2.7)
MCH RBC QN AUTO: 29.3 PG (ref 26.8–34.3)
MCH RBC QN AUTO: 29.3 PG (ref 26.8–34.3)
MCHC RBC AUTO-ENTMCNC: 32.9 G/DL (ref 31.4–37.4)
MCHC RBC AUTO-ENTMCNC: 33.5 G/DL (ref 31.4–37.4)
MCV RBC AUTO: 88 FL (ref 82–98)
MCV RBC AUTO: 89 FL (ref 82–98)
METAMYELOCYTES NFR BLD MANUAL: 2 % (ref 0–1)
METAMYELOCYTES NFR BLD MANUAL: 2 % (ref 0–1)
MICROCYTES BLD QL AUTO: PRESENT
MICROCYTES BLD QL AUTO: PRESENT
MONOCYTES # BLD AUTO: 0.15 THOUSAND/UL (ref 0–1.22)
MONOCYTES # BLD AUTO: 0.16 THOUSAND/UL (ref 0–1.22)
MONOCYTES NFR BLD: 1 % (ref 4–12)
MONOCYTES NFR BLD: 1 % (ref 4–12)
MYELOCYTES NFR BLD MANUAL: 1 % (ref 0–1)
MYELOCYTES NFR BLD MANUAL: 2 % (ref 0–1)
NEUTROPHILS # BLD MANUAL: 14.27 THOUSAND/UL (ref 1.85–7.62)
NEUTROPHILS # BLD MANUAL: 14.93 THOUSAND/UL (ref 1.85–7.62)
NEUTS BAND NFR BLD MANUAL: 12 % (ref 0–8)
NEUTS BAND NFR BLD MANUAL: 22 % (ref 0–8)
NEUTS SEG NFR BLD AUTO: 70 % (ref 43–75)
NEUTS SEG NFR BLD AUTO: 82 % (ref 43–75)
NRBC BLD AUTO-RTO: 5 /100 WBC (ref 0–2)
NRBC BLD AUTO-RTO: 5 /100 WBC (ref 0–2)
OVALOCYTES BLD QL SMEAR: PRESENT
PHOSPHATE SERPL-MCNC: 5.6 MG/DL (ref 2.7–4.5)
PLATELET # BLD AUTO: 56 THOUSANDS/UL (ref 149–390)
PLATELET # BLD AUTO: 58 THOUSANDS/UL (ref 149–390)
PLATELET BLD QL SMEAR: ABNORMAL
PLATELET BLD QL SMEAR: ABNORMAL
PMV BLD AUTO: 12.3 FL (ref 8.9–12.7)
PMV BLD AUTO: 12.4 FL (ref 8.9–12.7)
POIKILOCYTOSIS BLD QL SMEAR: PRESENT
POLYCHROMASIA BLD QL SMEAR: PRESENT
POLYCHROMASIA BLD QL SMEAR: PRESENT
POTASSIUM SERPL-SCNC: 3.7 MMOL/L (ref 3.5–5.3)
PROT SERPL-MCNC: 4.1 G/DL (ref 6.4–8.4)
RBC # BLD AUTO: 2.59 MILLION/UL (ref 3.81–5.12)
RBC # BLD AUTO: 2.76 MILLION/UL (ref 3.81–5.12)
RBC MORPH BLD: PRESENT
RBC MORPH BLD: PRESENT
SODIUM SERPL-SCNC: 144 MMOL/L (ref 135–147)
UNIT DISPENSE STATUS: NORMAL
UNIT PRODUCT CODE: NORMAL
UNIT PRODUCT VOLUME: 241 ML
UNIT RH: NORMAL
VARIANT LYMPHS # BLD AUTO: 2 %
WBC # BLD AUTO: 15.18 THOUSAND/UL (ref 4.31–10.16)
WBC # BLD AUTO: 16.23 THOUSAND/UL (ref 4.31–10.16)
WBC TOXIC VACUOLES BLD QL SMEAR: PRESENT

## 2024-04-11 PROCEDURE — 99291 CRITICAL CARE FIRST HOUR: CPT | Performed by: INTERNAL MEDICINE

## 2024-04-11 PROCEDURE — 99497 ADVNCD CARE PLAN 30 MIN: CPT | Performed by: PHYSICIAN ASSISTANT

## 2024-04-11 PROCEDURE — 0BJ08ZZ INSPECTION OF TRACHEOBRONCHIAL TREE, VIA NATURAL OR ARTIFICIAL OPENING ENDOSCOPIC: ICD-10-PCS | Performed by: INTERNAL MEDICINE

## 2024-04-11 PROCEDURE — 83735 ASSAY OF MAGNESIUM: CPT

## 2024-04-11 PROCEDURE — NC001 PR NO CHARGE: Performed by: INTERNAL MEDICINE

## 2024-04-11 PROCEDURE — 94664 DEMO&/EVAL PT USE INHALER: CPT | Performed by: SOCIAL WORKER

## 2024-04-11 PROCEDURE — 94760 N-INVAS EAR/PLS OXIMETRY 1: CPT | Performed by: SOCIAL WORKER

## 2024-04-11 PROCEDURE — 82550 ASSAY OF CK (CPK): CPT

## 2024-04-11 PROCEDURE — 94760 N-INVAS EAR/PLS OXIMETRY 1: CPT

## 2024-04-11 PROCEDURE — 85007 BL SMEAR W/DIFF WBC COUNT: CPT

## 2024-04-11 PROCEDURE — 82948 REAGENT STRIP/BLOOD GLUCOSE: CPT

## 2024-04-11 PROCEDURE — 99233 SBSQ HOSP IP/OBS HIGH 50: CPT | Performed by: STUDENT IN AN ORGANIZED HEALTH CARE EDUCATION/TRAINING PROGRAM

## 2024-04-11 PROCEDURE — 94640 AIRWAY INHALATION TREATMENT: CPT | Performed by: SOCIAL WORKER

## 2024-04-11 PROCEDURE — 99024 POSTOP FOLLOW-UP VISIT: CPT | Performed by: SURGERY

## 2024-04-11 PROCEDURE — 84100 ASSAY OF PHOSPHORUS: CPT

## 2024-04-11 PROCEDURE — 85027 COMPLETE CBC AUTOMATED: CPT

## 2024-04-11 PROCEDURE — 94640 AIRWAY INHALATION TREATMENT: CPT

## 2024-04-11 PROCEDURE — 99498 ADVNCD CARE PLAN ADDL 30 MIN: CPT | Performed by: PHYSICIAN ASSISTANT

## 2024-04-11 PROCEDURE — 94003 VENT MGMT INPAT SUBQ DAY: CPT

## 2024-04-11 PROCEDURE — 80053 COMPREHEN METABOLIC PANEL: CPT

## 2024-04-11 PROCEDURE — 99233 SBSQ HOSP IP/OBS HIGH 50: CPT | Performed by: PSYCHIATRY & NEUROLOGY

## 2024-04-11 RX ORDER — ALBUMIN (HUMAN) 12.5 G/50ML
25 SOLUTION INTRAVENOUS ONCE
Status: COMPLETED | OUTPATIENT
Start: 2024-04-11 | End: 2024-04-11

## 2024-04-11 RX ORDER — HEPARIN SODIUM 5000 [USP'U]/ML
5000 INJECTION, SOLUTION INTRAVENOUS; SUBCUTANEOUS EVERY 8 HOURS SCHEDULED
Status: DISCONTINUED | OUTPATIENT
Start: 2024-04-11 | End: 2024-04-13

## 2024-04-11 RX ORDER — FENTANYL CITRATE 50 UG/ML
100 INJECTION, SOLUTION INTRAMUSCULAR; INTRAVENOUS ONCE
Status: DISCONTINUED | OUTPATIENT
Start: 2024-04-11 | End: 2024-04-13

## 2024-04-11 RX ORDER — FUROSEMIDE 10 MG/ML
40 INJECTION INTRAMUSCULAR; INTRAVENOUS ONCE
Status: COMPLETED | OUTPATIENT
Start: 2024-04-11 | End: 2024-04-11

## 2024-04-11 RX ORDER — ALBUMIN (HUMAN) 12.5 G/50ML
25 SOLUTION INTRAVENOUS ONCE
Status: DISCONTINUED | OUTPATIENT
Start: 2024-04-11 | End: 2024-04-11

## 2024-04-11 RX ORDER — MIDAZOLAM HYDROCHLORIDE 2 MG/2ML
4 INJECTION, SOLUTION INTRAMUSCULAR; INTRAVENOUS ONCE
Status: COMPLETED | OUTPATIENT
Start: 2024-04-11 | End: 2024-04-11

## 2024-04-11 RX ORDER — FENTANYL CITRATE 50 UG/ML
100 INJECTION, SOLUTION INTRAMUSCULAR; INTRAVENOUS ONCE
Status: COMPLETED | OUTPATIENT
Start: 2024-04-11 | End: 2024-04-11

## 2024-04-11 RX ORDER — FUROSEMIDE 10 MG/ML
40 INJECTION INTRAMUSCULAR; INTRAVENOUS ONCE
Status: DISCONTINUED | OUTPATIENT
Start: 2024-04-11 | End: 2024-04-11

## 2024-04-11 RX ADMIN — LACOSAMIDE 100 MG: 100 TABLET, FILM COATED ORAL at 08:19

## 2024-04-11 RX ADMIN — MIDAZOLAM 2 MG: 1 INJECTION INTRAMUSCULAR; INTRAVENOUS at 12:23

## 2024-04-11 RX ADMIN — MINOCYCLINE HYDROCHLORIDE 200 MG: 50 TABLET, FILM COATED ORAL at 08:25

## 2024-04-11 RX ADMIN — Medication 2 SPRAY: at 22:00

## 2024-04-11 RX ADMIN — HEPARIN SODIUM 5000 UNITS: 5000 INJECTION INTRAVENOUS; SUBCUTANEOUS at 14:01

## 2024-04-11 RX ADMIN — ALBUMIN (HUMAN) 25 G: 0.25 INJECTION, SOLUTION INTRAVENOUS at 13:20

## 2024-04-11 RX ADMIN — DEXMEDETOMIDINE HYDROCHLORIDE 0.2 MCG/KG/HR: 4 INJECTION, SOLUTION INTRAVENOUS at 11:39

## 2024-04-11 RX ADMIN — HEPARIN SODIUM 5000 UNITS: 5000 INJECTION INTRAVENOUS; SUBCUTANEOUS at 22:40

## 2024-04-11 RX ADMIN — POLYETHYLENE GLYCOL 3350 17 G: 17 POWDER, FOR SOLUTION ORAL at 08:22

## 2024-04-11 RX ADMIN — LACOSAMIDE 100 MG: 100 TABLET, FILM COATED ORAL at 20:57

## 2024-04-11 RX ADMIN — SODIUM CHLORIDE SOLN NEBU 3% 4 ML: 3 NEBU SOLN at 07:51

## 2024-04-11 RX ADMIN — CHLORHEXIDINE GLUCONATE 0.12% ORAL RINSE 15 ML: 1.2 LIQUID ORAL at 08:17

## 2024-04-11 RX ADMIN — Medication 800 MG: at 08:16

## 2024-04-11 RX ADMIN — FUROSEMIDE 40 MG: 10 INJECTION, SOLUTION INTRAMUSCULAR; INTRAVENOUS at 14:00

## 2024-04-11 RX ADMIN — NYSTATIN: 100000 POWDER TOPICAL at 08:23

## 2024-04-11 RX ADMIN — PIPERACILLIN SODIUM AND TAZOBACTAM SODIUM 4.5 G: 36; 4.5 INJECTION, POWDER, LYOPHILIZED, FOR SOLUTION INTRAVENOUS at 00:11

## 2024-04-11 RX ADMIN — PIPERACILLIN SODIUM AND TAZOBACTAM SODIUM 4.5 G: 36; 4.5 INJECTION, POWDER, LYOPHILIZED, FOR SOLUTION INTRAVENOUS at 08:11

## 2024-04-11 RX ADMIN — Medication 2 SPRAY: at 08:00

## 2024-04-11 RX ADMIN — FENTANYL CITRATE 100 MCG: 50 INJECTION INTRAMUSCULAR; INTRAVENOUS at 12:08

## 2024-04-11 RX ADMIN — CHLORHEXIDINE GLUCONATE 0.12% ORAL RINSE 15 ML: 1.2 LIQUID ORAL at 20:56

## 2024-04-11 RX ADMIN — Medication 20 MG: at 08:19

## 2024-04-11 RX ADMIN — MINOCYCLINE HYDROCHLORIDE 200 MG: 50 TABLET, FILM COATED ORAL at 20:56

## 2024-04-11 RX ADMIN — NOREPINEPHRINE BITARTRATE 1 MCG/MIN: 1 INJECTION, SOLUTION, CONCENTRATE INTRAVENOUS at 12:42

## 2024-04-11 RX ADMIN — INSULIN LISPRO 1 UNITS: 100 INJECTION, SOLUTION INTRAVENOUS; SUBCUTANEOUS at 00:16

## 2024-04-11 RX ADMIN — SODIUM CHLORIDE SOLN NEBU 3% 4 ML: 3 NEBU SOLN at 19:44

## 2024-04-11 RX ADMIN — SODIUM CHLORIDE SOLN NEBU 3% 4 ML: 3 NEBU SOLN at 13:37

## 2024-04-11 RX ADMIN — NYSTATIN: 100000 POWDER TOPICAL at 17:01

## 2024-04-11 RX ADMIN — PIPERACILLIN SODIUM AND TAZOBACTAM SODIUM 4.5 G: 36; 4.5 INJECTION, POWDER, LYOPHILIZED, FOR SOLUTION INTRAVENOUS at 17:00

## 2024-04-11 NOTE — SEDATION DOCUMENTATION
Lumbar puncture with opening pressure of 24cm H2O. 21cc of csf collected for testing. Tube #1 3cc, tube #2 3cc, tube #3 3cc, tube #4 7cc

## 2024-04-11 NOTE — PROGRESS NOTES
[facility]  Progress Note: Critical Care  Name: Carla Hunt 59 y.o. female I MRN: [id number]  Unit/Bed#: MICU 10 I Date of Admission: 1/10/2024   Date of Service: 4/11/2024 I Hospital Day: 92    Assessment/Plan   Neuro:  #Alertness & analgesia  - OFF modafinil 100mg qd as of 4/8  - Delirium precautions  - Patient is awake and opening eyes but not moving purposefully or responding to commands, will hold on any sedating medications at this time  - Hold analgesia and sedation aside from periprocedural requirements     #Metabolic encephalopathy; POA  - Waxing and waning neuro exam since admission  - Most recent repeat CTH 4/5 without acute intracranial abnormality - obtained due to decline in mental status after interval improvement  - MRI brain on 4/6 showed findings suggestive of embolic etiology without significant edema, mass effect or hemorrhagic transformation (suspect septic emboli)  - Repeat MRI brain on 4/10 (obtained due to concern for acute decreased attenuation of the left hemisphere) confirms likely [septic] embolic strokes from prior scans + several new small areas of infarction + a small new hemorrhage in the left inferior parietal lobe.  - Electrolytes, sodium, hyperglycemia 2/2 high dose steroids requiring insulin gtt. Ammonia normal. TME may be prolonged 2/2 PNA, possibly worsened by uremic encephalopathy worsened by ELIESER now resolved, uremia improving s/p CRRT   - Bcx 2/2 (3/31) growing fungemia, identified as candida albicans  - Cryptococcal ag negative  - Aspergillus galactomannan ag negative  - Tb quantiferon gold indeterminate 2/2 lymphopenia    --  Plan:  - Corticosteroids now weaned to off  - CRRT off with persistent uremia   - Follow up serial blood cultures - positive for yeast  - Plan for SATURNINO as below  - Possible concomitant intraabdominal infection related to ostomy (see ID A/P) contributing to TME in setting of other comorbidities affecting  mentation (embolic strokes, left inferior parietal IPH, prolonged critical illness with associated delirium)  - Appreciate Neurology input  - Frequent neurochecks     #CVA due to multiple bilateral foci of acute infarcts   - MRI brain 4/6- multiple small bilateral foci of acute infarcts. No significant edema, mass effect, or hemorrhagic transformation -- suspicious for septic embolic phenomenon  - MRI brain on 4/10 showed known infarcts with several new small infarcts  - Not on AP/AC. Recent TTE 4/1 without vegetation   Plan:  - SATURNINO requested to assess for endocarditis in light of brain MRI concerning for septic emboli in setting of fungemia   - Patient does have a PFO on bubble study with a DVT in the RLE which also contributes to stroke risk  - Avoid ASA due to small hemorraghic conversion of septic emboli  - Frequent neurochecks     #Seizure disorder, known history  - S/p partial left temporal lobe resection in 2007 2/2 complex migraines  - On Lamictal 150 bid and Topamax 50mg bid at home  - Last lamotrigine level from 03/02 at 10.7 (therapeutic)  - Home Lamictal switched to Vimpat 3/27 due to worsening pancytopenia, neuro following  - Continue Vimpat 100mg bid maintenance dose  - Seizure precautions      #Anxiety, known history  -On Effexor 12.5 mg TID     CV:  #Sinus tachycardia  - TTE 3/17 with no major structural dysfunction  - Multifactorial 2/2 deconditioning, dehydration/hypvol, acute on chronic anemia, underlying infectious/inflammatory process, pain/agitation  - TTE 4/1 with EF 70%, no WMA, no changes from prior  - TTE with bubble study 4/7 showed LVEF 70% with right to left shunting related to PFO vs intrapulmonary shunt  - SATURNINO 4/11 as below  - Etiology likely secondary to infectious processes (fungemia, intra-abdominal infection related to ostomy, pneumonia) which are being addressed   - No need for beta blockade after discussion with Cardiology     Pulm:  #Acute hypoxic respiratory  failure;  #Bilateral ground glass opacities, improving  - Vent dependent; s/p trach 3/12 after was reintubated 3/11 due to increased WOB  - Complicated by recurrent bacterial PNA treated w 10d levaquin (completed 2/25) for MDR PNA; most recent BAL +recurrent stenotrophomona treated with Bactrim, switched to minocyline 14d course completed 3/28.  - Etiology likely multifactorial including possible COVID pneumonitis vs recurrent PNA vs hypersensitivity pneumonitis 2/2 ?rituxumab. Persistent inflammation likely given patient has been steroid responsive throughout admission.   - Repeat CXR 3/22 with significant improvement of multifocal PNA. Repeat COVID ag negative x2 3/27  - Follow up anti-Rituximab ab  - Aspergillus galactomannan ag negative  - Tb quantiferon gold indeterminate 2/2 lymphopenia   Plan:  - S/P 14d Remdesivir course to tx COVID-19, completed 3/15  - S/P 14d Minocycline course to tx stenotrophomonas PNA, completed 3/27   - Continue second course minocycline for recurrent steno PNA as below   - Steroids now off  - Follow up serial blood cultures   - Airway clearance protocol   - IV diuresis if volume overloaded - hold off on further diuresis for now  - Continue trach collar vs vent, wean O2 as able vs mechanical ventilation for respiratory distress     GI:  #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 2/2 poor wound healing midline incision  - Poor wound healing likely due to high dose steroid therapy s/p wound vac that was removed 3/17 by gen surg now s/p s/p OR 4/5 with surgically created mucus fistula and end ileostomy  Plan:  - Wound vac as per general surgery  - ALP and GGT elevated; likely ADR of fluconazole              - RUQ without acute biliary abnormality  - CT abdomen/pelvis with concern for purulent material in the peritoneum and collection adjacent to the ostomy (after discussion with Surgery; read from Radiology notes hemoperitoneum)              -  S/p IR drainage of fluid at same time as LP   - Follow up fluid cultures              - Appreciate Surgery assistance with this   - Zosyn as below  - Monitor ostomy pouch output  - ID following  - General surgery following     #Oropharyngeal dysphagia   - Has had multiple and prolonged intubations now s/p trach   - VBS 3/27 oropharyngeal dysphagia  - Continue tube feeds for now until potential PEG placement pending abdominal wound healing as per Gen Surg     #Esophagitis   - Acute on chronic anemia associated bloody NG output on 4/3  - Uremic, worsening suggestive of UGIB given relatively stable Cr  - Bedside EGD x2 (4/2,4/3) Ulcerated mucosa in the upper third of the esophagus without evidence of hemorrhage   Plan:  HSV/CMV/candida from GI sample negative  Continue PPI IV bid  Monitor NGT output, stool output  Concern for upper GI bleed given increasing BUN and decreased Hb (recently requiring transfusion 4/8 overnight) with thrombocytopenia and TEG normal; will lavage NGT if needed to assess for upper GI bleed as EGD did reveal ulcerations     :  #Uremia  - ?catabolic from steroids, may also have ?slow UGIB in setting of prolonged steroids though no overt s/s of GIB and H&H stable since 3/23 and patient is on ppi  - Trending up   - EGD without active source of bleeding but did note ulcerations  - FEurea suggestive of intrinsic pathology   - GFR largely stable, although slowly down trending  - Steroids now off  - Persistent off CRRT              - See above re lavage NGT plan  - BMP daily, uremia currently stable        F/E/N:  F: intermittent IVF boluses as needed  E: monitor and replete for goal K>4, P>3, Mg>2  N: tube feeds with Nepro 45 cc/h, Prosource liquid protein to 1 packet BID, Refugio BID to support wound healing, 50cc FWF q4h (held for SATURNINO)        Heme/Onc:  #Pancytopenia, improving with intermittent plt/prbc's transfusion, s/p Filgastrim x3  - In setting of hx of B cell lymphoma, prior chemo, Rituxan  therapy, prior abx and present meds including lamictal  - Hemo onc consulted, empirically given Filgastrim 300mg qd x3d (completed 3/30); IR bone marrow bx deferred by heme-onc   - Lamictal switched to Vimpat in consultation with neuro as above due to potential contribution to pancytopenia  - Trend CBC and diff daily, neutropenic precautions for ANC <500     #Acute on chronic anemia  - Baseline Hgb ~7-8. S/P 2U PRBCs 3/17 after repeat Hgb 5.3, total of 6U transfused since admission.  - Patient had significant blood loss from ostomy site 3/20, resulting in hemorraghic shock, improved with blood transfusion; hemostasis achieved after bleeding source identified and sutured. Another large vol danie bloody output from osotomy on 3/21, bleeding source within ostomy site, sutured.    - Also having intermittent vaginal bleeding  - EGD 4/3,4/4 without active bleeding  - Now with new concern as of 4/9 for recurrent bleeding given acute drop in Hb 4/8 overnight requiring transfusion + thrombocytoepnia + elevated BUN + TEG normal              - Will consider lavage NGT to assess for possible upper GI source given ulcerations noted on EGD (Hb recently stable)              - Also was concern for hemolysis                          - Haptoglobin normal                          - LDH - elevated                          - Check peripheral smear              - CT chest abdomen pelvis w contrast to evaluate for intraabdominal bleed related to surgical complications in the past - not found (see above)  - ?Worsened by impaired marrow response liekly 2/2 persistent inflammation due to low retic index ~1 prior to most recent transfusions; normocytic with normal B12/folate levels; MCV<100. Iatrogenic cause likely contributing 2/2 frequent blood draws; chronic anemia likely persistent inflammatory state/CKD/lymphoma in setting of elevated ferritin of 974. SPEP/UPEP negative.  Plan:  - Routine monitoring ostomy output, if bleeding, apply  direct pressure and contact gen surg STAT for hemostasis .  - Continue tube feeds/nutritional support  - Give FFP/vitamin K to correct INR as indicated  - Trend CBC, transfuse Transfuse with leukoreduced and irradiated RBCs to maintain Hgb>7     #Thrombocytopenia  - Likely multifactorial including imparied production from marrow dysfunction in setting of persistent inflammation; RI low suggestive of hyperproliferation, hx of B-cell lymphoma, recent infections including persistent COVID, PNA treated, CMV negative. No known history of vWD/congenital disorders. No liver dysfunction; recent hepatic profile unremarkable. HIT workup negative, Hemolytic workup negative. No s/s DIC. No mechanical valves. ?Primary ITP.  Plan:  - Filgrastim 300mg x3 to aid in plt recovery  - Transfuse with leukoreduced and irradiated PLTs if count <20k due to increased risk of bleeding   - Goal >50k if bleeding; goal >20K if no bleeding  - DVT ppx ok with Plt >50k, start heparin subq 4/11  - See plan under acute on chronic anemia      # Lymphopenia with bandemia  - In setting of high dose steroids, now off  - Severely depressed immunoglobulins inclding IgG, IgA, IgM  - At risk for further infections  - Filgrastim 300mg x3 to aid in plt recovery  - Contact and airborne precautions     #B cell lymphoma  - Follows with heme/onc at [facility]  - S/P 6 cycles of chemotherapy in 20222  - Maintained on Rituxan for 2 year course PTA, started May 2022, last dose was 12/2024, treatment currently on hold in setting of persistent COVID-19 infection  Anti-Ritux Ab negative  Plan:  - Holding rituximab in setting of persistent COVID-19 infection, now resolved.  ?resume rituxubmab pending clinical stability & anti-ritux ab status in consultation with heme-onc     DVT ppx: Lovenox     Endo:  #Steroid hyperglycemia and diabetes mellitus type 2, A1c at 6.6 from 01/2024  - Goal -180  - Utilize SSI no insulin gtt     #R thyroid gland enlargement  - Seen on CT  4/5  - Follow up US 4/5- Limited exam due to overlying tracheostomy tube and central line catheter. Heterogeneous lobular appearance to the remaining thyroid gland, nonspecific, question sequela of thyroiditis. No discrete nodule identified.   T4/TSH unremarkable   Plan: no longer planning thyroid FNA biopsy; likely bleed related to tracheostomy insertion     ID:  #Fungemia   BCx growing yeast - most recently on 4/8, ID panel- candida albicans  In setting of severe lymphopenia and severely depressed immunoglobulins   Septic emboli noted  Ophtho consulted, no evidence of ophthalmic endophthalmitis   Plan:   Continue fluconazole 800 mg qd (ELIESER resolved on CRRT); will discuss with ID about escalating to other antifungal agents given persistent fungemia on blood cultures  Follow up LP and check AFB, fungal culture, bacterial culture, crypto, cytology, ME panel, autoimmune panel  SATURNINO per Cardiology  Avoid broad spectrum abx as it can contribute to fungal growth  Continue Zosyn as there is now concern for intraabdominal infection related to ostomy  HD cath out  F/U Repeat serial Bcx (q48h) to ensure clearance    ID following; all Abx will need dose adjustments if renal function worsens     #PCP ppx  Stop Bactrim as steroids are off when ok with ID     #Recurrent bacterial pneumonia  - Patient has completed multiple treatment courses of remdesivir throughout hospitalization in addition to 7 days of ceftriaxone.  - BAL cultures in 2/2024 positive for MDR Pseudomonas and stenotrophomonas treated with 10d course of Levaquin  - CT C/A/P 3/10 with persistent groundglass opacities and changes suggestive of sequelae of COVID, prior infections.  Completed 10d course of cefepime for broad spectrum coverage (completed (3/13)  - Repeat BAL cultures from 3/11 showing 4+ growth Stenotrophomonas maltophilia. Could be colonization given prior BAL growth of same, however due to recent intubation with prolonged corticosteroid theraphy,  will begin treating as true pathogen. Patient otherwise remains afebrile, no leukocytosis. CRP with down trending.  Previously on Bactrim from 3/13 to 3/18, discontinued due to worsening ELIESER  Plan:  - S/P 14d Minocycline course to tx stenotrophomonas PNA, completed 3/27   - IV steroids as above  - Continue minocycline sputum Cx growing steno (3/31) - off 4/11        MSK/Skin:  #Midline incision wound with poor wound healing-  - General surgery performed staple removal of the patient's midline incision on 3/12 with some skin signs appreciated at the inferior aspect of the wound without obvious pus drainage. Overnight 3/13, wound dehiscence progressed requiring general surgery reevaluation. Red/brown aspirate noted, fascia intact. Wet-to-dry dressings in place. General surgery following for next Epson wound care.  - Poor wound healing in setting of high-dose steroid therapy.  No  exam no obvious signs of infection at this time.   - S/P wound vac replacement 3/13 as per gen surgery. No active concerns for cellulitis.  Plan:  - Wound VAC management as per general surgery  - Wound care for peritracheostomy wound  - Abdominal wound care as per general surgery  - Routine monitoring     #Critical illness myopathy  - Likely exacerbated by high-dose steroid therapy  Plan:  - Continue to wean steroids as above  - Aggressive PT/OT once clinically stable             Disposition: Critical care    ICU Core Measures     Vented Patient  VAP Bundle  VAP bundle ordered     A: Assess, Prevent, and Manage Pain Has pain been assessed? NA  Need for changes to pain regimen? NA   B: Both Spontaneous Awakening Trials (SATs) and Spontaneous Breathing Trials (SBTs) Plan to perform spontaneous awakening trial today? Yes   Plan to perform spontaneous breathing trial today? Yes   Obvious barriers to extubation? No   C: Choice of Sedation RASS Goal: 0 Alert and Calm  Need for changes to sedation or analgesia regimen? No   D: Delirium CAM-ICU:  Unable to perform secondary to Acute cognitive dysfunction   E: Early Mobility  Plan for early mobility? NA   F: Family Engagement Plan for family engagement today? Yes       Antibiotic Review: Continue broad spectrum secondary to severity of illness.     Review of Invasive Devices:    [place] Plan: Continue for accurate I/O monitoring for 48 hours        Prophylaxis:  VTE Heparin   Stress Ulcer  covered byomeprazole (PRILOSEC) suspension 2 mg/mL [[id number]]        Significant 24hr Events     24hr events: patient underwent LP and abdominal fluid drainage yesterday with IR. Tolerated the procedure well without complications (required levophed briefly overnight). SATURNINO postponed because tube feeds were not held; planned for today. No acute events overnight.     Subjective     Review of Systems   Unable to perform ROS: Mental status change        Objective                            Vitals I/O      Most Recent Min/Max in 24hrs   Temp 99 °F (37.2 °C) Temp  Min: 98.2 °F (36.8 °C)  Max: 100.7 °F (38.2 °C)   Pulse (!) 110 Pulse  Min: 99  Max: 120   Resp (!) 28 Resp  Min: 21  Max: 34   /59 BP  Min: 101/59  Max: 153/71   O2 Sat 97 % SpO2  Min: 96 %  Max: 100 %      Intake/Output Summary (Last 24 hours) at 2024 0606  Last data filed at 2024 0600  Gross per 24 hour   Intake 1945 ml   Output 2000 ml   Net -55 ml       Diet NPO    Invasive Monitoring   Arterial Line  [place] /60  No data recorded   MAP 83 mmHg  No data recorded           Physical Exam   Physical Exam  Vitals and nursing note reviewed.   Skin:     General: Skin is warm and dry.   HENT:      Head: Normocephalic and atraumatic.   Neck:      Trachea: Tracheostomy present.   Cardiovascular:      Rate and Rhythm: Regular rhythm. Tachycardia present.      Heart sounds: S1 normal and S2 normal. No murmur heard.  Musculoskeletal:      Cervical back: Neck supple.      Right lower le+ Pitting Edema present.      Left lower le+ Pitting Edema present.    Abdominal: General: Bowel sounds are normal.      Palpations: Abdomen is soft.   Constitutional:       General: She is in acute distress.      Appearance: Normal appearance. She is ill-appearing.      Interventions: She is intubated.   Pulmonary:      Effort: She is intubated.      Breath sounds: Normal breath sounds. No rales.   Neurological:      Mental Status: She is unresponsive.            Diagnostic Studies      EKG:   Imaging:  I have personally reviewed pertinent reports.       Medications:  Scheduled PRN   chlorhexidine, 15 mL, Q12H SARTHAK  fluconazole, 800 mg, Q24H  insulin lispro, 1-6 Units, Q6H SARTHAK  lacosamide, 100 mg, Q12H SARTHAK  minocycline, 200 mg, Q12H  nystatin, , BID  omeprazole (PRILOSEC) suspension 2 mg/mL, 20 mg, Daily  piperacillin-tazobactam, 4.5 g, Q8H  polyethylene glycol, 17 g, Daily  sodium chloride, 2 spray, BID  sodium chloride, 4 mL, TID  sulfamethoxazole-trimethoprim, 1 tablet, Once per day on Monday Wednesday Friday      acetaminophen, 650 mg, Q6H PRN  fentaNYL, 50 mcg, Q1H PRN  ondansetron, 4 mg, Q6H PRN  sodium chloride, 1 Application, Q1H PRN       Continuous    dexmedetomidine, 0.1-0.7 mcg/kg/hr, Last Rate: Stopped (04/08/24 1422)         Labs:    CBC    Recent Labs     04/10/24  1800 04/10/24  2134 04/11/24  0502   WBC 16.84* 20.41* 15.18*   HGB 8.5* 9.0* 8.1*   HCT 25.3* 27.0* 24.2*   PLT 93* 82* 58*   BANDSPCT 10* 8  --      BMP    Recent Labs     04/10/24  0543 04/11/24  0502   SODIUM 141 144   K 3.7 3.7    111*   CO2 20* 19*   AGAP 14* 14*   BUN 62* 65*   CREATININE 1.13 1.19   CALCIUM 10.2 9.9       Coags    No recent results     Additional Electrolytes  Recent Labs     04/10/24  0543 04/10/24  0859 04/11/24  0502   MG 1.9  --  2.0   PHOS 5.7*  --  5.6*   CAIONIZED  --  1.45*  --           Blood Gas    No recent results  Recent Labs     04/10/24  0545   PHVEN 7.275*   [id number] 40.2*   PO2VEN 56.1*   [id number] 18.3*   BEVEN -8.0   [id number] 86.5*    LFTs  Recent Labs      04/10/24  0543 04/11/24  0502   ALT 42 38   AST 24 21   ALKPHOS 918* 786*   ALB 2.3* 2.1*   TBILI 1.85* 1.45*       Infectious  No recent results  Glucose  Recent Labs     04/10/24  0543 04/11/24  0502   GLUC 166* 132               Livan Morfin, DO

## 2024-04-11 NOTE — PROGRESS NOTES
NEUROLOGY RESIDENCY PROGRESS NOTE     Name: Carla Hunt   Age & Sex: 59 y.o. female   MRN: [id number]  Unit/Bed#: MICU 10   Encounter: [id number]    Recommendations for outpatient neurological follow up have yet to be determined.     Pending for discharge: Clinical Improvement    ASSESSMENT & PLAN     Encephalopathy  Assessment & Plan  59 y.o. 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 Patient found to have COVID infection and ELIESER in ED. CTH with bifrontal hyperdensities concerning for hemorrhage vs calcifications. 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    1/8: Neurology consulted with concern for focal seizures in setting of Covid infection and worsening renal function.   1/9: loaded with Vimpat 400 mg IV x 1 and Topiramate increased to 150 mg twice daily.   1/10: Continued to be encephalopathic with periods of agitation/combativeness. Afebrile since 1/9 afternoon. No overt seizure-like activity noted by family or staff. On high-flow oxygen, remdesivir and steroids for underlying COVID. Renal function improved. MRI brain w/wo negative for stroke. Patient transferred to [facility] neuro ICU for ongoing management/video EEG monitoring (see below).  3/13: Neurology was reconsulted due to patient not following commands or moving any extremity.  Repeat CT head unremarkable.   "High suspicion for TME given prolonged hospitalization, infection, and cefepime use.  3/27: Neurology was reconsulted due to worsening pancytopenia and concern that this was related to Lamictal.  Will discontinue Lamictal and start Vimpat. (Previously on Vimpat at the beginning of her hospitalization due to being NPO and not being able to take her oral home AEDs.  No reported side effects).    Neurodiagnostics:   CTA H/N wwo contrast 1/7/24:   \"No acute arterial vascular abnormality in the head or neck. No flow-limiting large vessel arterial vascular stenosis in the head or neck. Tiny punctate foci of relative increased parenchymal density in the frontal lobes bilaterally, unchanged from 2 hours earlier and probably representing low-density parenchymal calcifications rather than parenchymal hemorrhage. Conservative management with an additional follow-up noncontrast head CT is recommended in 24 to 48 hours. Reidentified left temporal lobe resection. Dense basal ganglia and cerebellar calcifications again noted. Groundglass and consolidative airspace opacities most suspicious for extensive pneumonia seen throughout the visualized mid and upper lung zones more dense on the right than on the left.\"  MRI brain wo 1/9/24:  Tiny focus of abnormal diffusion signal within the right basal ganglia involving the anterior limb of the internal capsule not clearly restricted on the ADC maps but suspicious for small acute lacunar infarct.\"   MRI brain seizure wo and w contrast 1/10/24:  \"Previously visualized diffusion signal abnormality in the right basal ganglia is no longer identified and may have been artifactual given underlying susceptibility artifact in the bilateral globus pallidus.\"  Repeat CTH 3/6 and 3/9 with no acute changes.  VEEG (1/11 - 1/13; 57 hours):   Disorganized theta delta activity suggestive of moderate diffuse cerebral dysfunction.  The episode of groaning and nonspecific movements of extremities is " nonepileptic in etiology.  No electrographic seizures.  LP 1/12: attempted with low flow CSF, patient very restless. Initially clear fluid then blood tinged. Procedure aborted. Limited CSF results: M/E panel wnl, WBC 1, protein 50, Gram stain with no polys or bacteria  LP 1/15: WBC 1, protein 50, RBC 25, glucose 113, ME negative, HSV PCR negative, Gram stain/culture negative  MRI 4/10/24: As seen on the MRI from 4 days ago, there are small scattered infarcts within the cerebral hemispheres bilaterally suggesting a central embolic source. Small new infarcts are seen within the left inferior parietal lobe with a small acute hemorrhage noted, as seen on yesterday's CT scan. Findings are suggestive of additional small infarcts with focal hemorrhagic transformation. Stable postoperative appearance of the middle cranial fossa on the left status post temporal lobectomy. Complete opacification of the mastoid temporal bones bilaterally is again seen.  LP 4/10/24: WBC 1, (+) Xanthochromia, protein 59, glucose 124, ME negative    Relevant home meds:  Lamictal 200 mg BID  Topiramate 100 mg BID    Hospital AEDs:  Vimpat 200 mg BID started 1/8 due to being NPO and unable to take home AEDs; 400mg IV x1 1/9. Decreased to 150 mg BID 1/12 secondary to elevated level.  Now on 100 mg daily  Topiramate and Lamictal previously    Plan:  - Pending CSF studies   - CK ordered  - ID following, greatly appreciate antibiotic management  - Continue maintenance Vimpat 100 mg Q12 hours  - Continue provigil 100 mg daily  - Delirium precautions   - Continue to monitor and notify neurology with any changes.   - Medical management and supportive care per primary team. Correction of any metabolic or infectious disturbances    Cerebrovascular accident (CVA) due to embolism (HCC)  Assessment & Plan  Assessment:  MRI 4/6/2024 noted multiple small bilateral foci of acute infarcts. No significant edema, mass effect, or hemorrhagic transformation.   Neurology was consulted due to this finding.    MRI 4/10/24: As seen on the MRI from 4 days ago, there are small scattered infarcts within the cerebral hemispheres bilaterally suggesting a central embolic source. Small new infarcts are seen within the left inferior parietal lobe with a small acute hemorrhage noted, as seen on yesterday's CT scan. Findings are suggestive of additional small infarcts with focal hemorrhagic transformation. Stable postoperative appearance of the middle cranial fossa on the left status post temporal lobectomy. Complete opacification of the mastoid temporal bones bilaterally is again seen.    Lab Results   Component Value Date    HGBA1C 6.6 (H) 01/09/2024     Lab Results   Component Value Date    CHOLESTEROL 118 01/09/2024    TRIG 331 (H) 03/13/2024    HDL 40 (L) 01/09/2024    LDLCALC 59 01/09/2024     Impression: Strokes are embolic in nature.  Atrial fibrillation as a cause can be ruled out as the patient has been on telemetry monitoring for practically her whole hospitalization.  The patient's cause of emboli are currently unknown, given the infectious processes the patient currently has septic emboli are high on the differential however would not rule out these being caused by a PFO and a VTE event.  The patient's altered mental status is not due to her strokes as the stroke burden is very minimal.    Plan:  - Recommended SATURNINO, patient possibly getting it on 4/11/24  - Recommend getting a CTA Head w contrast to assess for mycotic aneurysms  - At this time would not recommend initiation of aspirin given the strong suspicion for septic emboli and the risks that these would cause bleeding.  - Continue Neuro checks  - Maintain glucose <180, SSI for coverage if indicated  - Repeat CTH if GCS drops 2pts in 1hr  - Atorvastatin 40 mg q.d.  - Continue monitoring on telemetry  - Rest of care per primary team        SUBJECTIVE     Patient is a 59 year old female who presented to the ED initially  for abdominal pain and found to have severe COVID encephalopathy. Today, the patient is still non-verbal. Patient was seen and examined. No acute events overnight.       Review of Systems   Unable to perform ROS: Patient nonverbal       OBJECTIVE     Patient ID: Carla Hunt is a 59 y.o. female.    Vitals:    24 0900 24 1013 24 1100 24 1116   BP: 103/61 112/60 114/59    BP Location: Left arm Left arm     Pulse: (!) 111 (!) 110 (!) 110 (!) 110   Resp: 22 22 (!) 25 22   Temp:    98.6 °F (37 °C)   TempSrc:    Axillary   SpO2: 98% 98% 99% 98%   Weight:       Height:          Temperature:   Temp (24hrs), Av.8 °F (37.1 °C), Min:98.2 °F (36.8 °C), Max:99.1 °F (37.3 °C)    Temperature: 98.6 °F (37 °C)      Physical Exam  Vitals reviewed.   HENT:      Head: Normocephalic and atraumatic.   Eyes:      Extraocular Movements: Extraocular movements intact.      Conjunctiva/sclera: Conjunctivae normal.      Pupils: Pupils are equal, round, and reactive to light.   Cardiovascular:      Rate and Rhythm: Tachycardia present.          Neurologic Exam     Mental Status   Level of consciousness: responsive to painful stimuli  Patient opens eyes to sternal rub but not following any other commands     Cranial Nerves     CN III, IV, VI   Pupils are equal, round, and reactive to light.    CN V   Right corneal reflex: normal  Left corneal reflex: normal    CN VII   Facial expression full, symmetric.   Blink to threat intact b/l, corneal reflexes intact, no facial asymmetry appreciated, cough and gag intact      Motor Exam   No movement with painful stimulation     Sensory Exam   No movement with painful stimulation     Gait, Coordination, and Reflexes     Reflexes   Right Velasco: absent  Left Velasco: absent  Right ankle clonus: absent  Left ankle clonus: absent  Diminished reflexes throughout       LABORATORY DATA     Labs: I have personally reviewed pertinent reports.    Results from last 7 days   Lab  Units 04/11/24  0502 04/10/24  2134 04/10/24  1800   WBC Thousand/uL 15.18* 20.41* 16.84*   HEMOGLOBIN g/dL 8.1* 9.0* 8.5*   HEMATOCRIT % 24.2* 27.0* 25.3*   PLATELETS Thousands/uL 58* 82* 93*   MONO PCT % 1* 2* 2*   EOS PCT % 0 0 0      Results from last 7 days   Lab Units 04/11/24  0502 04/10/24  0543 04/09/24  0446   SODIUM mmol/L 144 141 139   POTASSIUM mmol/L 3.7 3.7 4.4   CHLORIDE mmol/L 111* 107 106   CO2 mmol/L 19* 20* 19*   BUN mg/dL 65* 62* 60*   CREATININE mg/dL 1.19 1.13 1.02   CALCIUM mg/dL 9.9 10.2 10.2   ALK PHOS U/L 786* 918* 759*   ALT U/L 38 42 35   AST U/L 21 24 23     Results from last 7 days   Lab Units 04/11/24  0502 04/10/24  0543 04/09/24  0446   MAGNESIUM mg/dL 2.0 1.9 1.9     Results from last 7 days   Lab Units 04/11/24  0502 04/10/24  0543 04/09/24  0446   PHOSPHORUS mg/dL 5.6* 5.7* 5.4*      Results from last 7 days   Lab Units 04/09/24  0446 04/08/24  0505 04/07/24  0508   INR  1.40* 1.34* 1.40*     Results from last 7 days   Lab Units 04/10/24  0858   LACTIC ACID mmol/L 0.9           IMAGING & DIAGNOSTIC TESTING     Radiology Results: I have personally reviewed pertinent reports.   and I have personally reviewed pertinent films in PACS    IR drainage tube placement   Final Result by Kathi Quiroz MD (04/10 2333)   Impression:      CT-guided pelvic 10 Welsh drain placement yielding pus, transgluteal approach         CT-guided lumbar puncture, abnormal yellow tinge to the CSF fluid   Opening pressure 24 cm H2O, unclear if this is a reliable indicator of elevated pressures given prone positioning, paralysis, positive pressure ventilation      Workstation performed: [id number]         FL lumbar puncture diagnostic   Final Result by Kathi Quiroz MD (04/10 2333)   Impression:      CT-guided pelvic 10 Welsh drain placement yielding pus, transgluteal approach         CT-guided lumbar puncture, abnormal yellow tinge to the CSF fluid   Opening pressure 24 cm H2O, unclear if this is a reliable  "indicator of elevated pressures given prone positioning, paralysis, positive pressure ventilation      Workstation performed: [id number]         MRI brain wo contrast   Final Result by Lavon Kowalski DO (04/10 1146)      As seen on the MRI from 4 days ago, there are small scattered infarcts within the cerebral hemispheres bilaterally suggesting a central embolic source.      Small new infarcts are seen within the left inferior parietal lobe with a small acute hemorrhage noted, as seen on yesterday's CT scan. Findings are suggestive of additional small infarcts with focal hemorrhagic transformation.      Stable postoperative appearance of the middle cranial fossa on the left status post temporal lobectomy.      Complete opacification of the mastoid temporal bones bilaterally is again seen.      This examination was marked \"immediate notification\" in Epic in order to begin the standard process by which the radiology reading room liaison alerts the referring practitioner.      Workstation performed: [id number]         US right upper quadrant   Final Result by Jose M Henley MD (04/10 3485)      The common bile duct is not dilated. Mild perihepatic ascites.      Shadowing calculi in the right kidney      Gallbladder sludge with wall thickening but no gallstones or sonographic Bains's sign wall thickening can be associated with hepatic pathology. Correlation with any right upper quadrant pain is advised..      Mild perihepatic ascites.      The study was marked in EPIC for immediate notification..      Workstation performed: [id number]         CT chest abdomen pelvis w contrast   Final Result by Maryse Anderson MD (04/09 1274)      Small volume hemoperitoneum in the cul-de-sac, and small anterior abdominal wall hematoma. No large hematoma in the chest abdomen or pelvis.      Otherwise stable findings.               Workstation performed: [id number]         CT head wo contrast   Final Result by Danis Jeter, " MD (04/10 7841)      Interval development of a small amount of hemorrhage within the left parietal lobe since 4/5/2024.      Low-attenuation involving the left cerebral hemisphere as described above appears new since prior MRI and CT. Findings may be technical secondary to patient tilt of the gantry versus secondary to a developing infarct. Repeat MRI is recommended for    further evaluation.      Remainder of the findings, as described above.            I personally discussed this study with Dr. Medrano on 4/10/2024 8:51 AM.                        Workstation performed: [id number]         IR IVC filter placement optional/temporary   Final Result by Shahriar Shen MD (04/10 1119)   Placement of a temporary inferior vena cava filter.      PLAN:   This filter can be removed at any time in the future. Interventional Radiology will follow this patient to ensure removal when it is no longer needed.         Workstation performed: [id number]         XR chest portable ICU   Final Result by Edward Goodman MD (04/09 1003)      Multifocal consolidative opacities most dense over the left lung and left lower lung with small patchy density in the lateral right mid chest. Pulmonary parenchymal density appears decreased in the right mid chest. The appearance is suspicious for    multifocal pneumonia. Continued radiographic follow-up recommended.      Workstation performed: [id number]          VAS VENOUS DUPLEX - LOWER LIMB BILATERAL   Final Result by Gus King MD (04/07 1952)      MRI brain wo contrast   Final Result by Jason Cabrera MD (04/06 1605)      1.  Multiple small bilateral foci of acute infarcts as described suggesting embolic etiology. No significant edema, mass effect, or hemorrhagic transformation.   2.  Partial lack of sulcal CSF suppression in the FLAIR sequence noted in the supratentorial and infratentorial brain. This could be artifactual/technique related; however, inflammatory or infectious  process is not excluded. Correlate with clinical    assessment.   3.  Stable postoperative appearance from prior partial left temporal lobe resection.   4.  Complete opacification of bilateral mastoids air cells and middle ears.         The study was marked in EPIC for immediate notification.      Workstation performed: [id number]         US thyroid   Final Result by Dwight Austin MD (04/05 1231)      1. Limited exam due to overlying tracheostomy tube and central line catheter. Heterogeneous lobular appearance to the remaining thyroid gland, nonspecific, question sequela of thyroiditis. No discrete nodule identified.            Reference: ACR Thyroid Imaging, Reporting and Data System (TI-RADS): White Paper of the ACR TI-RADS Committee. J AM Maria G Radiol 2017;14:587-595. Additional recommendations based on American Thyroid Association 2015 guidelines.         Workstation performed: [id number]         CT chest abdomen pelvis w contrast   Final Result by Galdino Blankenship MD (04/05 1105)      Improved but persistent groundglass and consolidative bilateral pulmonary opacities.      No new intra-abdominal or intrapelvic findings.         Workstation performed: [id number]         CT head wo contrast   Final Result by Jona Morton MD (04/05 1053)      No acute intracranial abnormality status post left partial temporal lobectomy given limitations of beam-hardening streak artifact in posterior fossa.      Similar mild chronic microangiopathy.      Large bilateral mastoid and bilateral middle ear effusions, worsened on right side.                  Workstation performed: [id number]         XR chest portable ICU   Final Result by Jalen James MD (04/04 1615)         1. Interval placement of right IJ catheter tip overlying the lower SVC. Other tubes/lines stable.      2. Similar appearance of moderate diffuse bilateral infectious/inflammatory airspace opacities.            Workstation performed:  [id number]         VAS upper limb venous duplex scan, unilateral/limited   Final Result by Cooper Nielsen MD (04/04 1126)      XR chest portable ICU   Final Result by Cooper Cai MD (04/04 0848)      1. Stable appearance of right lung infiltrate, now with developing hazy airspace opacities in the left lung   2. Nasogastric tube tip in the gastric fundus and sideport at the esophagogastric junction. May consider advancement to assure sideport is below the EG junction.      The examination demonstrates a significant  finding and was documented as such in [facility] for liaison and referring practitioner notification.                  Workstation performed: [id number]         CT abdomen pelvis w contrast   Final Result by Anna Maldonado MD (04/01 1428)      Stable postsurgical changes status post ileocolectomy with right lower quadrant ileostomy with persistent associated inflammatory changes and subcutaneous emphysema. No focal drainable fluid collection identified. Trace ascites.      Persistent bilateral lower lobe atelectasis.         Workstation performed: [id number]         XR chest portable   Final Result by Cooper Cai MD (04/01 1328)      Focal consolidation in the right middle lung zone is approximately the same when compared to 3/31/2024, concerning for pneumonia.      Improvement in background groundglass consolidations in the left lung when compared to 3/31/2024.                     Resident: LOUISE LEUNG I, the attending radiologist, have reviewed the images and agree with the final report above.      Workstation performed: [id number]         CT head wo contrast   Final Result by Nawaf Mcneal MD (03/31 1011)         1. No acute intracranial hemorrhage, mass effect or edema.   2. 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   3. 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 Differential  diagnosis could include treatment related changes versus other etiologies..                  Workstation performed: [id number]         CT chest w contrast   Final Result by Ravi Rivera MD (04/01 1754)   Addendum (preliminary) 1 of 1 by Ravi Rivera MD (04/01 1754)   ADDENDUM:      Please note the findings described in the body of an enlarged    heterogeneous right thyroid lobe is new from recent CT chest performed 3    weeks ago.   Given the new tracheostomy this most likely is procedure related possibly    hemorrhage.   Ultrasound characterization may be obtained in a few weeks.      I personally discussed this study with Moises Bowden on 4/1/2024 5:54 PM.      .      Final      Bilateral parenchymal infiltrative changes with consolidation like focus in the right upper lobe is new. Relative worsening.               Workstation performed: [id number]         XR chest portable ICU   Final Result by Kathi Monique MD (03/31 1421)      Persistent groundglass opacity with new consolidation in the right midlung. See chest CT for further evaluation.            Workstation performed: [id number]         XR chest portable ICU   Final Result by Kathi Monique MD (03/30 0752)      Worsening bilateral groundglass opacity.            Workstation performed: [id number]         FL barium swallow video w speech   Final Result by VIRIDIANA VU (03/27 1426)      VAS upper limb venous duplex scan, unilateral/limited   Final Result by Alexandro Ortega DO (03/24 2156)      XR chest portable   Final Result by Ravi Rivera MD (03/22 1524)      Mild congestive changes.            Workstation performed: [id number]         XR chest portable ICU   Final Result by Jalen James MD (03/20 1647)      Overall mild improvement in multifocal bronchopneumonia, likely aspiration related.            Workstation performed: [id number]         XR chest portable ICU   Final Result by Nawaf Montoya,  MD (03/14 1335)      Worsening consolidation throughout the right lung and left lower lobe consistent with multi lobar pneumonia, possibly aspiration.      The study was marked in EPIC for immediate notification.      Workstation performed: [id number]         CT head wo contrast   Final Result by E. Alec Schoenberger, MD (03/13 3732)      No acute intracranial pathology. Stable postoperative changes status post left temporal lobectomy. Chronic microangiopathy.   Stable bilateral mastoid effusions, left greater than right and left middle ear effusion.. Stable sinus inflammatory changes.               Workstation performed: [id number]         XR chest portable   Final Result by Scot Edwards MD (03/13 1003)      Limited study. Tracheostomy appears satisfactory in position. Patchy pulmonary infiltrates throughout much of the right lung. Left basilar infiltrate. Otherwise, the left lung appears improved in aeration from prior exam. Recommend continued follow-up            Workstation performed: [id number]         XR chest portable ICU   Final Result by Lisa Carlson MD (03/12 1605)   Tubes and lines in satisfactory position.   Grossly unchanged diffuse parenchymal disease.            Workstation performed: [id number]         XR chest portable ICU   Final Result by Lisa Carlson MD (03/12 1605)   Tubes and lines in satisfactory position.   Grossly unchanged diffuse parenchymal disease.            Workstation performed: [id number]         XR chest portable   Final Result by Kathi Monique MD (03/11 1136)      Persistent extensive bilateral groundglass opacity.            Workstation performed: [id number]         CT chest abdomen pelvis wo contrast   Final Result by Julio C Alegre MD (03/10 0727)   1. Persistent bilateral groundglass and nodular consolidation with peripheral opacification at the right greater than left lung bases. Findings remain concerning for multi lobar infiltrates with possible  superimposed COVID-19 opacities.   2. Status post ileocolectomy with a right lower quadrant ileostomy again exhibiting a patent stoma. Persistent inflammatory change and subcutaneous emphysema after recent intervention. No drainable collection.   3. Bilateral renal calcifications again suggestive of medullary sponge kidney with persistent mild right renal fullness but no evidence of distal obstructive pathology.                     Workstation performed: [id number]         XR abdomen 1 view kub   Final Result by Eduardo Monk MD (03/11 0845)      Nonobstructive bowel gas pattern.               Workstation performed: [id number]         XR chest portable   Final Result by Kathi Monique MD (03/10 0954)      Persistent extensive bilateral groundglass opacity.            Workstation performed: [id number]         CT head wo contrast   Final Result by Lavon Calle MD (03/09 2859)      -No acute intracranial abnormality. Stable microangiopathic changes.   -Stable postoperative changes related to left temporal lobectomy.   -Redemonstrated air-fluid levels within bilateral sphenoid sinuses, left greater than right mastoid effusions and left middle ear effusion. Clinical correlation advised.         I personally communicated the findings via telephone with Ravi Garcia at 3:06 p.m. on 3/9/2024.                  Workstation performed: [id number]         XR chest portable   Final Result by Harry Peraza MD (03/09 1219)      No significant interval change in multifocal patchy groundglass opacities, left side greater than right. Findings are likely related to patient's COVID-19 pneumonia.            Resident: SANDIP Nieto I, the attending radiologist, have reviewed the images and agree with the final report above.      Workstation performed: [id number]         XR chest portable ICU   Final Result by Clinton Epperson MD (03/07 2791)      Diffuse patchy opacification in the right lung, either pneumonia or  asymmetric pulmonary edema, improved in appearance since 3/5/2024.      Persistent atelectasis and/or consolidation in the base of the left lower lobe.            Workstation performed: [id number]         CT head wo contrast   Final Result by Jaleel Persaud MD (03/06 1338)      No acute intracranial abnormality.      Stable postoperative changes related to left temporal lobectomy.      Mild chronic microangiopathic ischemic changes.      New air-fluid levels within the left maxillary and bilateral sphenoid sinuses suggestive of acute on chronic sinusitis. New bilateral mastoid fluid.                        Workstation performed: [id number]         CT chest abdomen pelvis wo contrast   Final Result by Dannie Alves MD (03/06 1415)      1. Increased extent of groundglass opacities and patchy consolidations throughout the lungs. Focal consolidations in the posterior lung bases and right middle lobe are not significantly changed compared to March 4, 2024. Again, findings are likely due to    a combination of COVID-19 and bacterial pneumonia.      2. Redemonstrated edema and subcutaneous gas within the anterior abdominal wall fat surrounding the right lower quadrant ostomy site, amount of gas slightly decreased from prior.      4. Mild right pelvocaliectasis and ureterectasis. No obstructing calculi.      5. Fusiform ectasia of the ascending thoracic aorta measuring 40 mm. Recommend follow-up low radiation dose chest CT in 1 year.         The study was marked in EPIC for immediate notification.         Resident: LOUISE LEUNG I, the attending radiologist, have reviewed the images and agree with the final report above.      Workstation performed: [id number]         XR chest portable ICU   Final Result by Dipika Boston MD (03/05 1312)      Enteric tube placement as above. Bilateral patchy opacities, right greater than left.            Workstation performed: [id number]         XR chest portable ICU  "  Final Result by Kathi Monique MD (03/05 0984)      Persistent extensive bilateral groundglass opacity.            Workstation performed: [id number]         CT chest abdomen pelvis w contrast   Final Result by Edward Goodman MD (03/04 1343)      Evolving changes in the lungs which represent some combination of evolving pneumonitis secondary to viral COVID infection, improving atelectasis/bacterial infection in the posterior lower lung zones, and developing focal bacterial type pneumonia in the    lateral aspect of the right middle lobe.      Cellulitic edema and subcutaneous gas in the fatty soft tissue surrounding the ostomy site in the right mid abdomen. This probably represents infectious cellulitis and air tracking backward from the ostomy site. No drainable abscess seen.      Small volume of complex ascites again noted in the dependent hemipelvis.      This examination was marked \"immediate notification\" in Epic in order to begin the standard process by which the radiology reading room liaison alerts the referring practitioner.                  Workstation performed: [id number]         XR chest portable ICU   Final Result by Scot Edwards MD (03/04 1155)      Line and tube appear satisfactory. Patchy bilateral pulmonary infiltrates which may be pneumonia or pulmonary edema. Possible small left pleural effusion            Workstation performed: [id number]         XR shoulder 2+ vw left   Final Result by Eduardo Monk MD (02/29 1017)      No acute osseous abnormality.         Workstation performed: [id number]         XR chest portable ICU   Final Result by Scot Edwards MD (02/28 1059)      Lines and tubes appear satisfactory.      Bilateral left greater than right infiltrates which could be asymmetric pulmonary edema or pneumonia. Correlate with clinical scenario            Workstation performed: [id number]         VAS upper limb venous duplex scan, complete, bilateral   Final " Result by Shahriar Sparks MD (02/26 2301)      CT pe study w abdomen pelvis w contrast   Final Result by Miquel Raygoza MD (02/26 0247)      1.  No evidence of pulmonary embolism.   2.  Dense patchy opacities in the bilateral lower lobes and groundglass opacities within the upper lung fields similar to prior examination likely due to pneumonia.   3.  Mild thickening of the remaining colon which may be due to under distention versus nonspecific colitis.   4.  Small amount of ascites similar to prior examination.         Workstation performed: [id number]         XR chest portable ICU   Final Result by Rissa Tariq MD (02/26 1138)      Similar appearance of diffuse interstitial and hazy/groundglass opacity.      Esophageal temperature probe tip terminates over the middle third of the esophagus, consider advancement to the distal third of the esophagus.         Workstation performed: [id number]         XR abdomen 1 view kub   Final Result by Rissa Tariq MD (02/26 1140)      No acute findings.               Workstation performed: [id number]         XR chest portable ICU   Final Result by Kathi Monique MD (02/25 1523)      Persistent extensive bilateral consolidation and groundglass opacity.            Workstation performed: [id number]         CTA chest (pe study) abdomen pelvis contrast   Final Result by Camila Patel MD (02/24 1317)      No pulmonary embolism      Mild improvement in the previously noted areas of diffuse lung opacification with the clearing in the upper lobe with residual areas of groundglass density and consolidations      Postoperative changes from the resection of the cecum/within the loculated fluid collection in the right paracolic gutter and pelvic cul-de-sac without any abscess with enhancing margins or air-fluid level         Moderate to marked distention of the stomach is again noted though decreased from the previous study of February 20, 2024. The feeding tube remains  in place         Incidentally noted is aneurysmal dilatation of the ascending artery measures about 4.1 cm, surveillance suggested      The study was marked in EPIC for significant notification.               Workstation performed: [id number]         RADIOLOGY RESULTS   Final Result by  (02/24 0948)      XR chest portable ICU   Final Result by Nawaf Mcneal MD (02/23 1634)      Worsening pulmonary opacification possibly worsening multifocal pneumonia/pneumonitis versus worsening pulmonary edema.            Workstation performed: [id number]         XR chest portable ICU   Final Result by Ajit Rome DO (02/22 1526)      Similar to slight improved aeration of the lungs bilaterally with persistent bilateral multifocal opacities            Workstation performed: [id number]         XR chest portable   Final Result by Willard Roper MD (02/21 1223)   Persistent diffuse bilateral opacities consistent with multifocal pneumonia or pulmonary edema      Stable tubes and lines         Workstation performed: [id number]         XR chest portable ICU   Final Result by Lisa Carlson MD (02/21 1046)      No significant interval change.            Workstation performed: [id number]         CT chest abdomen pelvis wo contrast   Final Result by Nawaf Ch MD (02/20 1142)      1.  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   2.  Moderate bilateral hydroureteronephrosis presumably due to significantly distended urinary bladder.   3.  Worsening widespread airspace opacities in keeping with pneumonitis/pneumonia.      I personally discussed this study with GABBY WINSTON on 2/20/2024 11:42 AM.               Workstation performed: [id number]         XR chest portable ICU   Final Result by Igor Nieto MD (02/17 1309)      ET and NG tubes remain      Persistent interstitial edema.      Partial right base clearing pneumonitis with left base worsening.      The study  was marked in EPIC for immediate notification.            Workstation performed: [id number]         XR chest portable ICU   Final Result by Joby Anne MD (02/14 0816)      Endotracheal tube projects 4.2 cm above the tracee with nasogastric tube below the diaphragm. No significant change in right greater than left airspace disease.            Workstation performed: [id number]         XR chest portable   Final Result by Kathi Monique MD (02/12 0819)      No definite change with persistent extensive bilateral groundglass opacity.            Workstation performed: [id number]         XR chest portable   Final Result by Ravi Rivera MD (02/10 1255)      Mild congestive changes with bilateral underlying infiltrates.            Workstation performed: [id number]         CTA chest pe study   Final Result by Kathi Monique MD (02/09 1222)      No pulmonary embolus.      No significant change since the chest CT from 2/3/2024 in extensive multifocal bilateral groundglass opacity, slightly greater on the right, and mild peripheral consolidation in the right lung with extensive bronchial dilation which may be due to a    combination of COVID-19 and Rituxan induced pneumonitis/fibrosis.            Workstation performed: [id number]         CT chest wo contrast   Final Result by Kathi Monique MD (02/03 1740)      Persistent extensive multi focal bilateral groundglass opacity, stable on the right and slightly increased in the left upper lobe and improving right middle and right lower lobe consolidation since 1/25/2024. Persistent moderate multifocal bronchial    dilation. This could be the sequela of COVID-19 pneumonia but Rituxan induced pneumonitis cannot be excluded.         Workstation performed: [id number]         XR chest portable   Final Result by Kathi Monique MD (01/28 2052)      Persistent extensive patchy bilateral groundglass opacity and consolidation.               Workstation  performed: [id number]         CTA chest pe study   Final Result by Kathi Monique MD (01/25 1620)      No pulmonary embolus.      Extensive bilateral groundglass opacity with multifocal consolidation in the right lung, greatest in the right lower lobe, compatible with pneumonia.      Mild multifocal bronchial dilation which may be due to to developing pulmonary fibrosis, although in some cases of COVID-19 pneumonia, bronchial dilation is reversible and does not represent fibrosis.      Mild calcification of the left anterior descending coronary artery indicating atherosclerotic heart disease.      Hepatic steatosis.      Workstation performed: [id number]         XR chest pa & lateral   Final Result by Nessa Schafer MD (01/22 1538)      Continued improved aeration of the bilateral airspace opacities.                  Workstation performed: [id number]         XR chest portable ICU   Final Result by Nessa Schafer MD (01/17 0922)      Improved aeration of the bilateral airspace opacities.                     Workstation performed: [id number]         XR chest portable ICU   Final Result by Eduardo Monk MD (01/15 1648)      Worsening diffuse bilateral airspace opacities.                  Workstation performed: [id number]         XR chest portable ICU   Final Result by Julio C Alegre MD (01/15 0757)   Persistent bilateral multi lobar opacities.      Workstation performed: [id number]         XR chest portable ICU   Final Result by Julio C Alegre MD (01/12 0936)      Worsening left-sided pulmonary opacities concerning for multi lobar infiltrates.                  Workstation performed: [id number]         MRI brain seizure wo and w contrast   Final Result by Manuel Mandel MD (01/11 0048)      Previously visualized diffusion signal abnormality in the right basal ganglia is no longer identified and may have been artifactual given underlying susceptibility artifact in the bilateral globus pallidus.          Workstation performed: [id number]         XR chest portable ICU   Final Result by Cooper Cai MD (01/11 0849)      1. Persistence of bilateral hazy diffuse airspace opacities in the lungs, without significant change, again suspicious for pneumonia   2. Nasogastric tube extends below left hemidiaphragm                        Workstation performed: [id number]         IR drainage tube check/change/reposition/reinsertion/upsize    (Results Pending)       Other Diagnostic Testing: I have personally reviewed pertinent reports.      ACTIVE MEDICATIONS     Current Facility-Administered Medications   Medication Dose Route Frequency    acetaminophen (TYLENOL) tablet 650 mg  650 mg Oral Q6H PRN    albumin human (FLEXBUMIN) 25 % injection 25 g  25 g Intravenous Once    chlorhexidine (PERIDEX) 0.12 % oral rinse 15 mL  15 mL Mouth/Throat Q12H SARTHAK    dexmedeTOMIDine (Precedex) 400 mcg in sodium chloride 0.9% 100 mL  0.1-0.7 mcg/kg/hr Intravenous Titrated    fentaNYL injection 100 mcg  100 mcg Intravenous Once    fentaNYL injection 50 mcg  50 mcg Intravenous Q1H PRN    fluconazole (DIFLUCAN) (HIGH DOSE) IVPB 800 mg  800 mg Intravenous Q24H    furosemide (LASIX) injection 40 mg  40 mg Intravenous Once    heparin (porcine) subcutaneous injection 5,000 Units  5,000 Units Subcutaneous Q8H [facility]    insulin lispro (HumALOG/ADMELOG) 100 units/mL subcutaneous injection 1-6 Units  1-6 Units Subcutaneous Q6H SARTHAK    lacosamide (VIMPAT) tablet 100 mg  100 mg Per NG Tube Q12H SARTHAK    minocycline (DYNACIN) tablet 200 mg  200 mg Per NG Tube Q12H    nystatin (MYCOSTATIN) powder   Topical BID    omeprazole (PRILOSEC) suspension 2 mg/mL  20 mg Per NG Tube Daily    ondansetron (ZOFRAN) injection 4 mg  4 mg Intravenous Q6H PRN    piperacillin-tazobactam (ZOSYN) 4.5 g in sodium chloride 0.9 % 100 mL IVPB (EXTENDED INFUSION)  4.5 g Intravenous Q8H    polyethylene glycol (MIRALAX) packet 17 g  17 g Per NG Tube Daily    sodium chloride (AYR SALINE  NASAL) nasal gel 1 Application  1 Application Nasal Q1H PRN    sodium chloride (OCEAN) 0.65 % nasal spray 2 spray  2 spray Each Nare BID    sodium chloride 3 % inhalation solution 4 mL  4 mL Nebulization TID    sulfamethoxazole-trimethoprim (BACTRIM DS) 800-160 mg per tablet 1 tablet  1 tablet Per NG Tube Once per day on Monday Wednesday Friday       Prior to Admission medications    Medication Sig Start Date End Date Taking? Authorizing Provider   busPIRone (BUSPAR) 5 mg tablet Take 5 mg by mouth 2 (two) times a day    Historical Provider, MD   escitalopram (LEXAPRO) 10 mg tablet  5/10/21   Historical Provider, MD   ibuprofen (MOTRIN) 600 mg tablet Take 1 tablet (600 mg total) by mouth every 6 (six) hours as needed for mild pain for up to 10 days  Patient not taking: Reported on 5/16/2023 10/21/20 5/26/22  Mallory Floyd MD   lamoTRIgine (LaMICtal) 200 MG tablet Take 1 tablet (200 mg total) by mouth 2 (two) times a day 5/16/23   Jamari Nino MD   lamoTRIgine (LaMICtal) 200 MG tablet 1 tablet by mouth twice per day. 9/12/23   Jamari Nino MD   ondansetron (ZOFRAN-ODT) 4 mg disintegrating tablet Take 1 tablet (4 mg total) by mouth every 8 (eight) hours as needed for nausea or vomiting  Patient not taking: Reported on 11/9/2020 10/21/20 11/20/20  Mallory Floyd MD   topiramate (TOPAMAX) 100 mg tablet Take 1 tablet (100 mg total) by mouth 2 (two) times a day 5/16/23   Jamari Nino MD   topiramate (TOPAMAX) 100 mg tablet Take 1 tablet (100 mg total) by mouth 2 (two) times a day 9/12/23   Jamari Nino MD   venlafaxine (EFFEXOR-XR) 75 mg 24 hr capsule Take 75 mg by mouth daily 12/18/23 12/17/24  Historical Provider, MD         VTE Pharmacologic Prophylaxis: Enoxaparin (Lovenox)  VTE Mechanical Prophylaxis: sequential compression device and foot pump applied    ======    I have discussed the patient's history, physical exam findings, assessment, and plan in detail with attending, Dr. Hill.    Thank you for  allowing me to participate in the care of your patient, Carla Hunt.    Cecy Calle DO  [facility] Neurology Residency, PGY-2

## 2024-04-11 NOTE — QUICK NOTE
SATURNINO attempted, but unable to pass probe.  Bronchoscopy with direct visualization utilized, but still unable to pass.  Patient tolerated procedure - does have some blood tinging on teeth.  No complications.    Plan to contact GI to assist with probe placement at a later date.

## 2024-04-11 NOTE — RESPIRATORY THERAPY NOTE
RT Ventilator Management Note  Carla Hunt 59 y.o. female MRN: [id number]  Unit/Bed#: [facility] Encounter: [id number]      Daily Screen         4/10/2024  0850 4/11/2024  0804          Patient safety screen outcome:: Passed Failed (P)       Not Ready for Weaning due to:: -- Underline problem not resolved (P)                 Physical Exam:   Assessment Type: Assess only  Respiratory Pattern: Assisted  Chest Assessment: Chest expansion symmetrical  Bilateral Breath Sounds: Diminished, Coarse, Expiratory wheezes      Resp Comments: (P) Pt not started on psv wean due to procedure. Md Mays aware of wean not started. Pt sx for small amt white. Pt has weak cough and gag. Cont to monitor pts resp status.

## 2024-04-11 NOTE — BRIEF OP NOTE (RAD/CATH)
INTERVENTIONAL RADIOLOGY PROCEDURE NOTE    Date: 4/10/2024    Procedure:   IR LUMBAR PUNCTURE  IR DRAIN PLACEMENT    Procedure Summary       Date:  Room / Location:     Anesthesia Start:  Anesthesia Stop:     Procedure:  Diagnosis:     Scheduled Providers:  Responsible Provider:     Anesthesia Type: Not recorded ASA Status: Not recorded            Preoperative diagnosis:   1. Acute respiratory failure with hypoxia (HCC)    2. Encephalopathy acute    3. Sepsis (HCC)    4. COVID    5. Pneumonia    6. Viral pneumonia    7. Epistaxis    8. Hypotension, unspecified hypotension type    9. Pneumonia of both lungs due to infectious organism, unspecified part of lung    10. Abnormal CT of the head    11. 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 Abdominal wall cellulitis    14. Open wound of hand except fingers    15. Goals of care, counseling/discussion    16. Tracheostomy present ([facility])    17. Hypotension    18. Stage 3b chronic kidney disease (CKD) (HCC)    19. Anemia    20. Hemorrhagic shock (HCC)    21. Tachycardia    22. Sinus tachycardia    23. Pancytopenia (HCC)    24. Hyperglycemia    25. Urinary retention    26. Vaginal bleeding    27. Hospital-acquired pneumonia    28. Candidiasis    29. Hematemesis    30. Gastric ulcer    31. Uremia    32. Thyroid mass    33. Embolic stroke ([facility])    34. DVT (deep vein thrombosis) in pregnancy    35. DVT (deep venous thrombosis) ([facility])         Postoperative diagnosis: Same.    Surgeon: Kathi Quiroz MD     Assistant: None. No qualified resident was available.    Blood loss: 0    Specimens:     Multiple CSF  Including CSF 5 cc in flow media    Purulent transgluteal drain material for culture aerobic anaerobic fungal      Findings:       First we began with a clean procedure the lumbar puncture  25 cc yellow-tinged CSF  20 cc in the lumbar puncture tubes  5 cc direct  Opening pressure 24cm H20    Right transgluteal drain placement   10 Indonesian drain placement  10 cc  cheesy pus    Complications: None immediate.    Anesthesia: general

## 2024-04-11 NOTE — PROGRESS NOTES
Progress Note - Infectious Disease   Carla Hunt 59 y.o. female MRN: [id number]  Unit/Bed#: MICU 10 Encounter: [id number]      Impression/Plan:    1. Sepsis, recurrent since admission.  Due to COVID-19 infection, recurrent pneumonias, cecal volvulus status post surgery and wound dehiscence, now with persistent candidemia, fecal contamination of midline wound, multiple embolic strokes, possible intra-abdominal abscess versus hemoperitoneum.  Status post IR drain placement to pelvic collection 4/10 which yielded pus.  Gram stain shows yeast which may be a source of persistent fungemia.  Also status post LP yesterday which was not concerning for CNS infection.   -Continue high-dose fluconazole   -Continue IV Zosyn, continuous infusion dosing by pharmacy   -Discontinue minocycline after today  -Follow temperatures closely  -Recheck CBC diff in AM to monitor infection  -Supportive care as per the primary service  -Follow-up pending cultures, drain culture    2. Acute hypoxic respiratory failure, likely multifactorial, with both COVID and bacterial pneumonia contributing.  Also consider persistent inflammation, fibrosis as seems quite steroid responsive in past.  Most recently extubated 3/1.  Patient with worsening respiratory status requiring intubation again 3/11.  Suspect ongoing hypoxia related to persistent COVID-pneumonia, superimposed bacterial infection, inflammatory component/lung fibrosis related to COVID, now with profound deconditioning and muscle weakness.  Status post bronchoscopy and trach 3/12 with excessive secretions seen from the lower lobes bilaterally.  BAL culture from 3/11 shows heavy growth of Stenotrophomonas maltophilia, Candida albicans.  PJP DFA negative.  Fungitell was positive but patient was on beta-lactam antibiotics and had received multiple blood transfusions.  She was clinically improving and monitor off antifungals. Serum cryptococcal Ag negative. Status post 10 days of  vancomycin and cefepime, 14-day course of remdesivir/Paxlovid 3/15, 14-day course of antibiotics with minocycline for stenotrophomonas pneumonia. Status post repeat bronchoscopy 3/17 for airway clearance with continued thick secretions.   Histoplasma urine antigen negative.  Patient was clinically improving and weaned to trach collar but 3/30 had worsening lethargy, again placed on the ventilator 3/31. Antibiotics restarted.  Sputum culture is now growing stenotrophomonas maltophilia and mixed respiratory lauren. Repeat galactomannan negative on 4/2. Repeat CT stable. Was on high dose steroids nearly 3 months, now weaned off.  No active concern for pneumonia at this time   -Complete 10-day course of minocycline today  -Antifungals as below    3. Candidemia.  2 sets of blood cultures collected 3/31 are growing Candida albicans, susceptible to fluconazole.  The patient has numerous risk factors for candidemia including presence of midline, abdominal surgery, prolonged hospitalization in the ICU, multiple courses of broad-spectrum antibiotics, leukopenia/recent neutropenia.  Suspect ileostomy retraction with wound contamination is the source.  TTE no vegetations.  Status post ophthalmology evaluation, no evidence of endophthalmitis.  MRI brain now shows bilateral infarcts, consideration for possible septic emboli.  Unfortunately patient with persistent fungemia 3/10/2024.  Persistent bacteremia raising concern for endovascular source of infection versus lack of source control.  Concerned this is related to intra-abdominal process as new pelvic abscess was drained yesterday by IR and the Gram stain shows yeast.  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  Also consideration for endocarditis versus a perivalvular abscess; SATURNINO unable to be performed today.   -Continue IV fluconazole 800mg daily    -Repeat blood cultures tomorrow   -Follow-up repeat blood cultures.  Requested that micro lab  send out for repeat susceptibility testing   -Recommend repeat TTE in a few days to assess for valvular changes since SATURNINO cannot be performed   -Neurology recommending CTA to assess for mycotic aneurysm    4.  Bilateral acute infarcts on MRI brain.  Suggesting embolic etiology.  Consideration for endocarditis in setting of fungemia.  May also be related to paradoxical embolism from right lower extremity DVT and PFO.  Per neurology, low concern this is causing her persistent encephalopathy.  Repeat MRI shows new infarcts in the left inferior parietal lobe with small area of acute hemorrhage noted.  IVC filter placed 4/9   -Neurology following   -Follow-up CTA    5. Recurrent bacterial pneumonia.  The patient has completed multiple treatment courses over the hospitalization. Prior BAL cultures with Pseudomonas and stenotrophomonas.  Unclear if pathogens or colonizers.  Status post 10 days levofloxacin.  CT C/A/P showed evolving changes likely all due to sequelae of COVID, infections.  Noted to have worsening hypoxia and purulent secretions on bronchoscopy 3/11.  BAL culture with heavy growth of Stenotrophomonas maltophilia, Candida.  Completed 14-day course of minocycline 3/27.  CT chest now shows new right upper lobe infiltrate and sputum culture is again growing Stenotrophomonas maltophilia.  Repeat CT chest 4/5 with improving but persistent groundglass and consolidative opacities.              -Antibiotics as above              -Wean O2 as able     6. Severe COVID, present on admission.  Patient was treated with a 10-day course of remdesivir, dexamethasone and was given 1 dose of Tocilizumab on admission.  COVID antigen was negative prior to coming off isolation.  Had positive COVID PCR from BAL 2/16, status post another 5-day course of remdesivir.  Patient has remained on high-dose systemic corticosteroid throughout her hospitalization.  COVID antigen positive and PCR is also positive 3/3 and Ct 26.8, consistent  with high viral replication.  Repeat PCR positive with Ct closer to 30. Status post 14-day course of remdesivir/Paxlovid 3/15/2024.  Rapid COVID antigen negative 3/25 and 3/27  -Steroid wean per ICU  -No additional antivirals indicated  - Bactrim for PJP prophylaxis, can likely discontinue     7. 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  Patient is status post exploratory laparotomy with ileocecectomy and end ileostomy creation 2/20.   End ileostomy is with ongoing ischemic changes which is likely contributing to the imaging findings and ongoing SIRS response.  Now with wound dehiscence status post staple removal, status post wound VAC placement 3/13, removed but replaced due to bleeding.  Now with retraction of ileostomy and fecal contamination through midline wound.  Status post washout 4/4.  Repeat CT now shows small volume hemoperitoneum in the pelvis, small anterior abdominal wall hematoma.  Status post IR drain placement with drainage of pus.  Gram stain shows yeast  -Surgery follow-up   -Continue Zosyn     8. B-cell lymphoma, on maintenance rituxan, which is currently postponed.  Rituximab is a risk factor for persistent COVID infection.  There is now a hypovascular mass of the thyroid gland enlarging on serial imaging, concern for lymphoma.  IR consulted for biopsy.    9.  ELIESER.  Likely multifactorial due to prerenal status, medications.  With worsening BUN and creatinine.  Concern GI bleed make a contributing to high BUN.  Would also consider if this is attributing to worsening mental status.  Status post CRRT, now off with improvement in renal function. HD catheter removed due to persistent fungemia   -Monitor creatinine closely   -Nephrology following    10.  Anemia, thrombocytopenia, lymphopenia/neutropenia.  Patient has had persistent lymphopenia throughout this admission, likely due to rituximab, viral infection, high-dose steroids.  Now with worsening anemia, neutropenia, and  thrombocytopenia.  Bactrim discontinued 3/18. CMV PCR negative.  Not a likely side effect of minocycline.  Immunoglobulins are low.  Started on Granix 3/27, white blood cell count now improved.  May be seen in IRIS type response with multiple underlying infections. Current bandemia likely multifactorial from recent G-CSF, weaning steroids, multiple infections   -Transfusion support per primary   -Hematology follow-up   -Monitor CBC    11.  GI bleed, ulcerations.  Status post EGD yesterday which showed erosive gastritis, esophageal tear with oozing, blood and extensive clot in the esophagus and stomach, small ulcers and trauma throughout the stomach.  Status post repeat EGD 4/4.  GMS, CMV/HSV stains negative on pathology.   -Continue PPI    12. Persistent encephalopathy. Worsened 3/30. MRI brain shows multiple small bilateral foci of acute infarcts as described suggesting embolic etiology, partial lack of sulcal CSF suppression in the FLAIR sequence noted in the supratentorial and infratentorial brain. This could be artifactual/technique related; however, inflammatory or infectious process is not excluded. Patient opens her eyes and tracks.  Status post lumbar puncture 4/10, CSF studies not consistent with meningitis.   -Follow-up CTA   -neurology following   -Management of infections as above    13.  Elevated alk phos/T. bili.  Worsening over the past few days.  May be related to fluconazole.  Right upper quadrant ultrasound no significant abnormalities.   -If LFTs worsen will discontinue fluconazole and switch to micafungin    Above management plan to continue antibiotics/antifungals, follow-up pending blood and drain cultures with the critical care team.  Discussed with the  and daughter during multidisciplinary meeting.    I have spent a total time of 70 minutes on 4/11  in caring for this patient including impressions, documenting in the medical record, reviewing / ordering tests and medications,  obtaining or reviewing history and communicating with other healthcare professionals.       Antibiotics:  Fluconazole  Minocycline  Zosyn  Bactrim ppx    Subjective:  Unable to complete SATURNINO today due to risk of esophageal injury.  Status post LP yesterday, also status post transgluteal drain placement into pelvic collection which revealed 10 cc of purulent fluid.  Culture from this shows yeast on Gram stain.  The patient is afebrile today with stable leukocytosis.  She opens her eyes but otherwise remains minimally responsive.    In the afternoon, participated in multidisciplinary meeting with the critical care team, neurology, palliative care.  I explained the issue with persistent fungemia which may be related to lack of source control.  There was an abscess in the pelvis for which drain placement was performed yesterday so hopefully this will achieve some source control.  We explained that with prolonged illness and immunocompromise, her mental status may be multifactorial and will be difficult to assess what her clinical improvement will be if we are able to control some of these infections.    Objective:  Vitals:  Temp:  [98.2 °F (36.8 °C)-99.1 °F (37.3 °C)] 98.6 °F (37 °C)  HR:  [] 101  Resp:  [16-34] 24  BP: ()/(53-73) 118/60  SpO2:  [96 %-100 %] 99 %  Temp (24hrs), Av.8 °F (37.1 °C), Min:98.2 °F (36.8 °C), Max:99.1 °F (37.3 °C)  Current: Temperature: 98.6 °F (37 °C)    Physical Exam:   General Appearance:  Ill-appearing, lethargic   Neck: Trach in place.   Lungs:   Minimal rhonchi bilaterally   Heart:  RRR; no murmur, rub or gallop   Abdomen:   Midline wound with dressing in place.  Gluteal drainage catheter with purulent appearing fluid   Extremities: No edema   Skin: No new rashes or lesions.        Labs:   All pertinent labs and imaging studies were personally reviewed  Results from last 7 days   Lab Units 24  0502 04/10/24  2134 04/10/24  1800   WBC Thousand/uL 15.18* 20.41* 16.84*    HEMOGLOBIN g/dL 8.1* 9.0* 8.5*   PLATELETS Thousands/uL 58* 82* 93*     Results from last 7 days   Lab Units 04/11/24  0502 04/10/24  0543 04/09/24  0446   SODIUM mmol/L 144 141 139   POTASSIUM mmol/L 3.7 3.7 4.4   CHLORIDE mmol/L 111* 107 106   CO2 mmol/L 19* 20* 19*   BUN mg/dL 65* 62* 60*   CREATININE mg/dL 1.19 1.13 1.02   EGFR ml/min/1.73sq m 50 53 60   CALCIUM mg/dL 9.9 10.2 10.2   AST U/L 21 24 23   ALT U/L 38 42 35   ALK PHOS U/L 786* 918* 759*     Results from last 7 days   Lab Units 04/05/24  0522   PROCALCITONIN ng/ml 5.61*       Results from last 7 days   Lab Units 04/07/24  0508   CRP mg/L 165.6*       Imaging:  CT C/A/P-Small volume hemoperitoneum in the cul-de-sac, and small anterior abdominal wall hematoma. No large hematoma in the chest abdomen or pelvis

## 2024-04-11 NOTE — PLAN OF CARE
Problem: Prexisting or High Potential for Compromised Skin Integrity  Goal: Skin integrity is maintained or improved  Description: INTERVENTIONS:  - Identify patients at risk for skin breakdown  - Assess and monitor skin integrity  - Assess and monitor nutrition and hydration status  - Monitor labs   - Assess for incontinence   - Turn and reposition patient  - Assist with mobility/ambulation  - Relieve pressure over bony prominences  - Avoid friction and shearing  - Provide appropriate hygiene as needed including keeping skin clean and dry  - Evaluate need for skin moisturizer/barrier cream  - Collaborate with interdisciplinary team   - Patient/family teaching  - Consider wound care consult   Outcome: Progressing     Problem: INFECTION - ADULT  Goal: Absence or prevention of progression during hospitalization  Description: INTERVENTIONS:  - Assess and monitor for signs and symptoms of infection  - Monitor lab/diagnostic results  - Monitor all insertion sites, i.e. indwelling lines, tubes, and drains  - Monitor endotracheal if appropriate and nasal secretions for changes in amount and color  - [place] appropriate cooling/warming therapies per order  - Administer medications as ordered  - Instruct and encourage patient and family to use good hand hygiene technique  - Identify and instruct in appropriate isolation precautions for identified infection/condition  Outcome: Progressing     Problem: SAFETY ADULT  Goal: Patient will remain free of falls  Description: INTERVENTIONS:  - Educate patient/family on patient safety including physical limitations  - Instruct patient to call for assistance with activity   - Consult OT/PT to assist with strengthening/mobility   - Keep Call bell within reach  - Keep bed low and locked with side rails adjusted as appropriate  - Keep care items and personal belongings within reach  - Initiate and maintain comfort rounds  - Make Fall Risk Sign visible to staff  - Offer Toileting every  2 Hours, in advance of need  - Initiate/Maintain bed alarm  - Obtain necessary fall risk management equipment: non skid footwear  - Apply yellow socks and bracelet for high fall risk patients  - Consider moving patient to room near nurses station  Outcome: Progressing     Problem: RESPIRATORY - ADULT  Goal: Achieves optimal ventilation and oxygenation  Description: INTERVENTIONS:  - Assess for changes in respiratory status  - Assess for changes in mentation and behavior  - Position to facilitate oxygenation and minimize respiratory effort  - Oxygen administered by appropriate delivery if ordered  - Initiate smoking cessation education as indicated  - Encourage broncho-pulmonary hygiene including cough, deep breathe, Incentive Spirometry  - Assess the need for suctioning and aspirate as needed  - Assess and instruct to report SOB or any respiratory difficulty  - Respiratory Therapy support as indicated  Outcome: Progressing  Problem: Potential for Falls  Goal: Patient will remain free of falls  Description: INTERVENTIONS:  - Educate patient/family on patient safety including physical limitations  - Instruct patient to call for assistance with activity   - Consult OT/PT to assist with strengthening/mobility   - Keep Call bell within reach  - Keep bed low and locked with side rails adjusted as appropriate  - Keep care items and personal belongings within reach  - Initiate and maintain comfort rounds  - Make Fall Risk Sign visible to staff  - Offer Toileting every 2 Hours, in advance of need  - Initiate/Maintain bed alarm  - Obtain necessary fall risk management equipment: non skid footwear  - Apply yellow socks and bracelet for high fall risk patients  - Consider moving patient to room near nurses station  Outcome: Progressing     Problem: Nutrition/Hydration-ADULT  Goal: Nutrient/Hydration intake appropriate for improving, restoring or maintaining nutritional needs  Description: Monitor and assess patient's  nutrition/hydration status for malnutrition. Collaborate with interdisciplinary team and initiate plan and interventions as ordered.  Monitor patient's weight and dietary intake as ordered or per policy. Utilize nutrition screening tool and intervene as necessary. Determine patient's food preferences and provide high-protein, high-caloric foods as appropriate.     INTERVENTIONS:  - Monitor oral intake, urinary output, labs, and treatment plans  - Assess nutrition and hydration status and recommend course of action  - Evaluate amount of meals eaten  - Assist patient with eating if necessary   - Allow adequate time for meals  - Recommend/ encourage appropriate diets, oral nutritional supplements, and vitamin/mineral supplements  - Order, calculate, and assess calorie counts as needed  - Recommend, monitor, and adjust tube feedings and TPN/PPN based on assessed needs  - Assess need for intravenous fluids  - Provide specific nutrition/hydration education as appropriate  - Include patient/family/caregiver in decisions related to nutrition  Outcome: Progressing     Problem: COPING  Goal: Pt/Family able to verbalize concerns and demonstrate effective coping strategies  Description: INTERVENTIONS:  - Assist patient/family to identify coping skills, available support systems and cultural and spiritual values  - Provide emotional support, including active listening and acknowledgement of concerns of patient and caregivers  - Reduce environmental stimuli, as able  - Provide patient education  - Assess for spiritual pain/suffering and initiate spiritual care, including notification of Pastoral Care or [place] based community as needed  - Assess effectiveness of coping strategies  Outcome: Progressing  Goal: Will report anxiety at manageable levels  Description: INTERVENTIONS:  - Administer medication as ordered  - Teach and encourage coping skills  - Provide emotional support  - Assess patient/family for anxiety and ability to  cope  Outcome: Progressing     Problem: BEHAVIOR  Goal: Pt/Family maintain appropriate behavior and adhere to behavioral management agreement, if implemented  Description: INTERVENTIONS:  - Assess the family dynamic   - Encourage verbalization of thoughts and concerns in a socially appropriate manner  - Assess patient/family's coping skills and non-compliant behavior (including use of illegal substances).  - Utilize positive, consistent limit setting strategies supporting safety of patient, staff and others  - Initiate consult with Case Management, Spiritual Care or other ancillary services as appropriate  - If a patient's/visitor's behavior jeopardizes the safety of the patient, staff, or others, refer to organization procedure.   - Notify Security of behavior or suspected illegal substances which indicate the need for search of the patient and/or belongings  - Encourage participation in the decision making process about a behavioral management agreement; implement if patient meets criteria  Outcome: Progressing     Problem: NEUROSENSORY - ADULT  Goal: Achieves stable or improved neurological status  Description: INTERVENTIONS  - Monitor and report changes in neurological status  - Monitor vital signs such as temperature, blood pressure, glucose, and any other labs ordered   - Initiate measures to prevent increased intracranial pressure  - Monitor for seizure activity and implement precautions if appropriate      Outcome: Progressing  Goal: Achieves maximal functionality and self care  Description: INTERVENTIONS  - Monitor swallowing and airway patency with patient fatigue and changes in neurological status  - Encourage and assist patient to increase activity and self care.   - Encourage visually impaired, hearing impaired and aphasic patients to use assistive/communication devices  Outcome: Progressing     Problem: CARDIOVASCULAR - ADULT  Goal: Maintains optimal cardiac output and hemodynamic  stability  Description: INTERVENTIONS:  - Monitor I/O, vital signs and rhythm  - Monitor for S/S and trends of decreased cardiac output  - Administer and titrate ordered vasoactive medications to optimize hemodynamic stability  - Assess quality of pulses, skin color and temperature  - Assess for signs of decreased coronary artery perfusion  - Instruct patient to report change in severity of symptoms  Outcome: Progressing  Goal: Absence of cardiac dysrhythmias or at baseline rhythm  Description: INTERVENTIONS:  - Continuous cardiac monitoring, vital signs, obtain 12 lead EKG if ordered  - Administer antiarrhythmic and heart rate control medications as ordered  - Monitor electrolytes and administer replacement therapy as ordered  Outcome: Progressing     Problem: GASTROINTESTINAL - ADULT  Goal: Minimal or absence of nausea and/or vomiting  Description: INTERVENTIONS:  - Administer IV fluids if ordered to ensure adequate hydration  - Maintain NPO status until nausea and vomiting are resolved  - Nasogastric tube if ordered  - Administer ordered antiemetic medications as needed  - Provide nonpharmacologic comfort measures as appropriate  - Advance diet as tolerated, if ordered  - Consider nutrition services referral to assist patient with adequate nutrition and appropriate food choices  Outcome: Progressing  Goal: Maintains or returns to baseline bowel function  Description: INTERVENTIONS:  - Assess bowel function  - Encourage oral fluids to ensure adequate hydration  - Administer IV fluids if ordered to ensure adequate hydration  - Administer ordered medications as needed  - Encourage mobilization and activity  - Consider nutritional services referral to assist patient with adequate nutrition and appropriate food choices  Outcome: Progressing  Goal: Maintains adequate nutritional intake  Description: INTERVENTIONS:  - Monitor percentage of each meal consumed  - Identify factors contributing to decreased intake, treat as  appropriate  - Assist with meals as needed  - Monitor I&O, weight, and lab values if indicated  - Obtain nutrition services referral as needed  Outcome: Progressing  Goal: Establish and maintain optimal ostomy function  Description: INTERVENTIONS:  - Assess bowel function  - Encourage oral fluids to ensure adequate hydration  - Administer IV fluids if ordered to ensure adequate hydration   - Administer ordered medications as needed  - Encourage mobilization and activity  - Nutrition services referral to assist patient with appropriate food choices  - Assess stoma site  - Consider wound care consult   Outcome: Progressing  Goal: Oral mucous membranes remain intact  Description: INTERVENTIONS  - Assess oral mucosa and hygiene practices  - Implement preventative oral hygiene regimen  - Implement oral medicated treatments as ordered  - Initiate Nutrition services referral as needed  Outcome: Progressing     Problem: GENITOURINARY - ADULT  Goal: Maintains or returns to baseline urinary function  Description: INTERVENTIONS:  - Assess urinary function  - Encourage oral fluids to ensure adequate hydration if ordered  - Administer IV fluids as ordered to ensure adequate hydration  - Administer ordered medications as needed  - Offer frequent toileting  - Follow urinary retention protocol if ordered  Outcome: Progressing  Goal: Urinary catheter remains patent  Description: INTERVENTIONS:  - Assess patency of urinary catheter  - If patient has a chronic marie, consider changing catheter if non-functioning  - Follow guidelines for intermittent irrigation of non-functioning urinary catheter  Outcome: Progressing     Problem: METABOLIC, FLUID AND ELECTROLYTES - ADULT  Goal: Electrolytes maintained within normal limits  Description: INTERVENTIONS:  - Monitor labs and assess patient for signs and symptoms of electrolyte imbalances  - Administer electrolyte replacement as ordered  - Monitor response to electrolyte replacements,  including repeat lab results as appropriate  - Instruct patient on fluid and nutrition as appropriate  Outcome: Progressing  Goal: Fluid balance maintained  Description: INTERVENTIONS:  - Monitor labs   - Monitor I/O and WT  - Instruct patient on fluid and nutrition as appropriate  - Assess for signs & symptoms of volume excess or deficit  Outcome: Progressing  Goal: Glucose maintained within target range  Description: INTERVENTIONS:  - Monitor Blood Glucose as ordered  - Assess for signs and symptoms of hyperglycemia and hypoglycemia  - Administer ordered medications to maintain glucose within target range  - Assess nutritional intake and initiate nutrition service referral as needed  Outcome: Progressing     Problem: HEMATOLOGIC - ADULT  Goal: Maintains hematologic stability  Description: INTERVENTIONS  - Assess for signs and symptoms of bleeding or hemorrhage  - Monitor labs  - Administer supportive blood products/factors as ordered and appropriate  Outcome: Progressing     Problem: MUSCULOSKELETAL - ADULT  Goal: Maintain or return mobility to safest level of function  Description: INTERVENTIONS:  - Assess patient's ability to carry out ADLs; assess patient's baseline for ADL function and identify physical deficits which impact ability to perform ADLs (bathing, care of mouth/teeth, toileting, grooming, dressing, etc.)  - Assess/evaluate cause of self-care deficits   - Assess range of motion  - Assess patient's mobility  - Assess patient's need for assistive devices and provide as appropriate  - Encourage maximum independence but intervene and supervise when necessary  - Involve family in performance of ADLs  - Assess for home care needs following discharge   - Consider OT consult to assist with ADL evaluation and planning for discharge  - Provide patient education as appropriate  Outcome: Progressing     Problem: MOBILITY - ADULT  Goal: Maintain or return to baseline ADL function  Description: INTERVENTIONS:  -   Assess patient's ability to carry out ADLs; assess patient's baseline for ADL function and identify physical deficits which impact ability to perform ADLs (bathing, care of mouth/teeth, toileting, grooming, dressing, etc.)  - Assess/evaluate cause of self-care deficits   - Assess range of motion  - Assess patient's mobility; develop plan if impaired  - Assess patient's need for assistive devices and provide as appropriate  - Encourage maximum independence but intervene and supervise when necessary  - Involve family in performance of ADLs  - Assess for home care needs following discharge   - Consider OT consult to assist with ADL evaluation and planning for discharge  - Provide patient education as appropriate  Outcome: Progressing  Goal: Maintains/Returns to pre admission functional level  Description: INTERVENTIONS:  - Perform AM-PAC 6 Click Basic Mobility/ Daily Activity assessment daily.  - Set and communicate daily mobility goal to care team and patient/family/caregiver.   - Collaborate with rehabilitation services on mobility goals if consulted  - Record patient progress and toleration of activity level   Outcome: Progressing

## 2024-04-11 NOTE — PROGRESS NOTES
Progress Note - General Surgery   Carla Hunt 59 y.o. 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/13, anticipated poor wound healing on prolonged high dose steroids and neutropenia; candidemia with noted UGI bleed s/p EGD with esophageal ulcerations per GI  S/p Or 4/4 for midline wound exploration with mucus fistula and end ileostomy viable appearing and above the fascia, noted nonviable subq tissue debrided and skin bridge between ostomy and midline incision removed, small area of exposed bowel protected and stoma isolated  Course complicated by pelvic abscess s/p IR drain placement on 4/10 and persistent candidemia   DVT with presumed paradoxical emboli  Multiple embolic strokes    Vent: S (CMV) 16/400/6/40, overbreathing respiratory rate 21-30, tachycardia 100s to 110's, other vitals normal  Ortiz 1350cc  NG tube capped  Wound manager 300 cc brown liquid  IR drain 0 output recorded, 10 cc cheesy pus initially   cc brown liquid    WBC 15.18 from 20.41  Hemoglobin 8.1 from 9.0  Creatinine 1.19 from 1.13  T. bili 1.45 from 1.85    4/9 blood cultures: 0 mycelia  4/10 IR cultures: 1+ yeast  410 lumbar puncture: No organisms, xanthochromia    Plan:  Plan for SATURNINO today to assess for intracardiac vegetations  Readvanced tube feeds after SATURNINO  Maintain wound VAC and wound manager, monitor output, change VAC Monday Wednesday Friday  Appreciate ID recommendations, continue high-dose fluconazole, IV Zosyn, minocycline  Rest of care per ICU team    Subjective/Objective     Subjective: Patient went for lumbar puncture and IR drain yesterday, IR drainage with 10 cc cheesy pus out initially, having wound manager output, no acute events  "overnight, tube feeds held for SATURNINO today    Objective:     Blood pressure 101/59, pulse (!) 110, temperature 99 °F (37.2 °C), temperature source Axillary, resp. rate (!) 28, height 5' 8\" (1.727 m), weight 84 kg (185 lb 3 oz), SpO2 97%.,Body mass index is 28.16 kg/m².      Intake/Output Summary (Last 24 hours) at 4/11/2024 0620  Last data filed at 4/11/2024 0600  Gross per 24 hour   Intake 1945 ml   Output 2000 ml   Net -55 ml       Invasive Devices       Peripheral Intravenous Line  Duration             Peripheral IV 04/08/24 Proximal;Right;Ventral (anterior) Forearm 2 days    Peripheral IV 04/09/24 Left;Proximal;Ventral (anterior) Forearm 2 days              Drain  Duration             Ileostomy RUQ 50 days    Urethral Catheter Three way 18 Fr. 10 days    NG/OG/Enteral Tube Nasogastric 18 Fr Right nare 7 days    Abscess Drain Buttock <1 day              Airway  Duration             Surgical Airway Shiley Cuffed 29 days                    Physical Exam:   Gen:    Ill-appearing  CV:      warm, well-perfused  Lungs: S (CMV) 16/400/6/40, overbreathing respiratory rate 21-30  Abd:     IR drain minimal purulent bulb, VAC brown liquid, wound manager scant brown liquid, NG tube, abdomen soft nondistended  Ext:      no CCE  Neuro: Opens eyes but does not follow commands    "

## 2024-04-11 NOTE — ADDENDUM NOTE
Addendum  created 04/10/24 2106 by Clinton Schofield CRNA    Intraprocedure Meds edited, Orders acknowledged in Narrator

## 2024-04-11 NOTE — PLAN OF CARE
Problem: Prexisting or High Potential for Compromised Skin Integrity  Goal: Skin integrity is maintained or improved  Description: INTERVENTIONS:  - Identify patients at risk for skin breakdown  - Assess and monitor skin integrity  - Assess and monitor nutrition and hydration status  - Monitor labs   - Assess for incontinence   - Turn and reposition patient  - Assist with mobility/ambulation  - Relieve pressure over bony prominences  - Avoid friction and shearing  - Provide appropriate hygiene as needed including keeping skin clean and dry  - Evaluate need for skin moisturizer/barrier cream  - Collaborate with interdisciplinary team   - Patient/family teaching  - Consider wound care consult   Outcome: Progressing     Problem: Nutrition/Hydration-ADULT  Goal: Nutrient/Hydration intake appropriate for improving, restoring or maintaining nutritional needs  Description: Monitor and assess patient's nutrition/hydration status for malnutrition. Collaborate with interdisciplinary team and initiate plan and interventions as ordered.  Monitor patient's weight and dietary intake as ordered or per policy. Utilize nutrition screening tool and intervene as necessary. Determine patient's food preferences and provide high-protein, high-caloric foods as appropriate.     INTERVENTIONS:  - Monitor oral intake, urinary output, labs, and treatment plans  - Assess nutrition and hydration status and recommend course of action  - Evaluate amount of meals eaten  - Assist patient with eating if necessary   - Allow adequate time for meals  - Recommend/ encourage appropriate diets, oral nutritional supplements, and vitamin/mineral supplements  - Order, calculate, and assess calorie counts as needed  - Recommend, monitor, and adjust tube feedings and TPN/PPN based on assessed needs  - Assess need for intravenous fluids  - Provide specific nutrition/hydration education as appropriate  - Include patient/family/caregiver in decisions related to  nutrition  Outcome: Progressing     Problem: INFECTION - ADULT  Goal: Absence or prevention of progression during hospitalization  Description: INTERVENTIONS:  - Assess and monitor for signs and symptoms of infection  - Monitor lab/diagnostic results  - Monitor all insertion sites, i.e. indwelling lines, tubes, and drains  - Monitor endotracheal if appropriate and nasal secretions for changes in amount and color  - [place] appropriate cooling/warming therapies per order  - Administer medications as ordered  - Instruct and encourage patient and family to use good hand hygiene technique  - Identify and instruct in appropriate isolation precautions for identified infection/condition  Outcome: Progressing     Problem: SAFETY ADULT  Goal: Patient will remain free of falls  Description: INTERVENTIONS:  - Educate patient/family on patient safety including physical limitations  - Instruct patient to call for assistance with activity   - Consult OT/PT to assist with strengthening/mobility   - Keep Call bell within reach  - Keep bed low and locked with side rails adjusted as appropriate  - Keep care items and personal belongings within reach  - Initiate and maintain comfort rounds  - Make Fall Risk Sign visible to staff  - Offer Toileting every 2 Hours, in advance of need  - Initiate/Maintain bed alarm  - Obtain necessary fall risk management equipment: non skid footwear  - Apply yellow socks and bracelet for high fall risk patients  - Consider moving patient to room near nurses station  Outcome: Progressing     Problem: RESPIRATORY - ADULT  Goal: Achieves optimal ventilation and oxygenation  Description: INTERVENTIONS:  - Assess for changes in respiratory status  - Assess for changes in mentation and behavior  - Position to facilitate oxygenation and minimize respiratory effort  - Oxygen administered by appropriate delivery if ordered  - Initiate smoking cessation education as indicated  - Encourage broncho-pulmonary  hygiene including cough, deep breathe, Incentive Spirometry  - Assess the need for suctioning and aspirate as needed  - Assess and instruct to report SOB or any respiratory difficulty  - Respiratory Therapy support as indicated  Outcome: Progressing     Problem: SKIN/TISSUE INTEGRITY - ADULT  Goal: Skin Integrity remains intact(Skin Breakdown Prevention)  Description: Assess:  -Perform Flavio assessment every shift   -Clean and moisturize skin every day   -Inspect skin when repositioning, toileting, and assisting with ADLS  -Assess under medical devices such as ronny  every hour   -Assess extremities for adequate circulation and sensation     Bed Management:  -Have minimal linens on bed & keep smooth, unwrinkled  -Change linens as needed when moist or perspiring  -Avoid sitting or lying in one position for more than 2 hours while in bed  -Keep HOB at 45 degrees     Toileting:  -Offer bedside commode  -Assess for incontinence every hour  -Use incontinent care products after each incontinent episode such as moisture barrier cream     Activity:  -Mobilize patient 3 times a day  -Encourage activity and walks on unit  -Encourage or provide ROM exercises   -Turn and reposition patient every 2 Hours  -Use appropriate equipment to lift or move patient in bed  -Instruct/ Assist with weight shifting every hour when out of bed in chair  -Consider limitation of chair time 2 hour intervals    Skin Care:  -Avoid use of baby powder, tape, friction and shearing, hot water or constrictive clothing  -Relieve pressure over bony prominences using allevyn   -Do not massage red bony areas    Next Steps:  -Teach patient strategies to minimize risks such as weight shifting    -Consider consults to  interdisciplinary teams such as PT  Outcome: Progressing  Goal: Incision(s), wounds(s) or drain site(s) healing without S/S of infection  Description: INTERVENTIONS  - Assess and document dressing, incision, wound bed, drain sites and  surrounding tissue  - Provide patient and family education  Outcome: Progressing  Goal: Pressure injury heals and does not worsen  Description: Interventions:  - Implement low air loss mattress or specialty surface (Criteria met)  - Apply silicone foam dressing  - Apply fecal or urinary incontinence containment device   - Utilize friction reducing device or surface for transfers   - Consider nutrition services referral as needed  Outcome: Progressing

## 2024-04-11 NOTE — SEDATION DOCUMENTATION
LP and drainage tube placement successfully completed by Dr. Quiroz without complications. Tolerated well with anesthesia support. Report provided to micu rn.

## 2024-04-11 NOTE — ACP (ADVANCE CARE PLANNING)
Record of Family Meeting - Palliative and Supportive Care   Carla Hunt 59 y.o. female [id number]      Recommendations and Plan:  Ongoing medical optimization without limits at this time. Await any improvement in metabolic encephalopathy as individual contributing factors are treated.  Pursue further diagnostics that may gain perspective of cause for persistent encephalopathy/critical illness.  Consider revisiting GOC if mental status does not improve. Spouse mentioned date of May 12th as a point to readdress GOC if mental status does not improve. However, he is open to revisiting sooner if warranted.  Overall, Regan remains hopeful that Carla makes improvement to advance to rehab (understanding that prognosis of her long term recovery is very guarded).        A family meeting was held to provide medical update. This meeting was necessary for determine the appropriate course of treatment.  Time of Meeting: 3:30  [facility]  Participants:   Dr. Calle, Neurology  Dr. Sr, ID  Rory Morfin and Davon, critical care  Spouse, Regan  Daughter, Cathryn Ruiz, pastoral care  Tania Velez, [facility], palliative care  Hannah Ramirez PA-C, palliative care   Dr. Harris unable to attend and spoke with Regan prior to meeting    Patient Participation: unable secondary to AMS    Patient Support System: family as above     Advanced Directive of POLST available: no    Topics of Discussion:  Regan recalls that his understanding from conversation with surgery is that source control of abdominal wound is adequate and not the source of persistent illness. He relays that further intervention such as a second drain remains available if warranted  Dr. Sr described patient being on appropriate anti-fungals and antibiotics. However, difficulty clearing blood cultures.  CSF generally unremarkable but awaiting several results yet  Cannot rule out mycotic aneurysms which neurology discussed  further  Unable to perform SATURNINO and therefor valvular abscess cannot be ruled out. Consider repeat TTE if necessary.  Dr. Calle discussed that patient has multifocal embolic strokes, concerning for septic emboli. Will order CTA to rule out mycotic aneurysms.   Also reports that CSF generally unremarkable for CNS infection  Discussed that mental status does not necessary correlate with findings on MRI brain.  CK also WNL ruling out critical illness myopathy as a primary cause of encephalopathy  Dr. Mays expresses nature of extensive work-up and system based approach for best comprehensive care.  Discusses complicated nature of multifactorial metabolic encephalopathy and that time will likely lead to improvement of this.  Palliative team validated daughter's concern regarding patient's persistently depressed mental status and that she may not return to an acceptable quality of life  Cathryn and Regan both reiterate that patient loved being independent and would not find long term SNF care acceptable QOL. She was also clear that she did not want a trach but Regan elected to proceed hoping that it was a short term measure.     Other Content of Meeting:  - following meeting Regan and Cathryn re-entered conference room with pastoral care and palliative care at which time additional support provided.  - Cathryn specifically benefited from validation of her very realistic concerns about possibility that patient does not recover and her own struggle balancing grief associated with her mom's critical illness with her upcoming wedding.     Time Involved in patient care and meetin minutes, beginning at approximately  3:30 and ending at approximately 5pm.  Time spent coordinating family meeting, communicating with multidisciplinary specialty, and providing support to spouse at bedside prior to today's meeting.    Hannah Ramirez PA-C   Palliative and Supportive Care  Clinic/Answering Service: [phone]  You can find me on  Yari!     This note was not shared with the patient due to privacy exception: note includes other individuals

## 2024-04-11 NOTE — PROGRESS NOTES
Progress Note - General Surgery   Carla Hunt 59 y.o. female MRN: [id number]  Unit/Bed#: MICU 10 Encounter: [id number]    Assessment:  59F with COVID/strep pna, B cell lymphoma on rituxumab, CKD; prolonged hospitalization 2/2 acute hypoxic respiratory failure s/p MICU bedside trach, hospitalization complicated by cecal volvulus s/p ileocectomy, mucus fistula, end ileostomy on 3/13, anticipated poor wound healing on prolonged high dose steroids and neutropenia; candidemia with noted UGI bleed s/p EGD with esophageal ulcerations per GI  S/p Or 4/4 for midline wound exploration with mucus fistula and end ileostomy viable appearing and above the fascia, noted nonviable subq tissue debrided and skin bridge between ostomy and midline incision removed, small area of exposed bowel protected and stoma isolated  Course complicated by pelvic abscess s/p IR drain placement on 4/10 and persistent candidemia   DVT with presumed paradoxical emboli  Multiple embolic strokes  S/p aborted SATURNINO 4/11    Vent: S(CMV) 16/400/6/40  Gtt: Off pressors  Tachycardia 110's, one episode of hypotension at 12:30pm yesterday, now resolved, other vitals normal  Ortiz 1940cc  NG with TF running at 45cc/hr  Wound manager 50cc liquid and semi solid brown stool  VAC 100cc brown liquid  IR drain 10cc purulent    WBC pending (from 16.23)  Hb pending (from 7.6)  Cr pending (from 1.19)  T bili pending (from 1.45)    4/9 Blood cultures: Candida albicans  4/10 Blood cultures: Budding yeast  4/10 IR cultures: 1+ yeast  4/10 Lumbar puncture culture: No growth    Plan:  Slowly advance tube feeds to goal as tolerated  Maintain wound VAC, monitor output, change VAC Monday Wednesday Friday  Appreciate ID recommendations, continue high-dose fluconazole, IV Zosyn, minocycline discontinued  Rest of care per ICU team    Subjective/Objective     Subjective: No acute events overnight, remains intubated, having output from ileostomy    Objective:     Blood pressure  "126/65, pulse (!) 110, temperature 98 °F (36.7 °C), temperature source Axillary, resp. rate 18, height 5' 8\" (1.727 m), weight 84 kg (185 lb 3 oz), SpO2 99%.,Body mass index is 28.16 kg/m².      Intake/Output Summary (Last 24 hours) at 4/12/2024 0536  Last data filed at 4/12/2024 0401  Gross per 24 hour   Intake 585 ml   Output 2625 ml   Net -2040 ml       Invasive Devices       Peripheral Intravenous Line  Duration             Peripheral IV 04/08/24 Proximal;Right;Ventral (anterior) Forearm 3 days    Peripheral IV 04/09/24 Left;Proximal;Ventral (anterior) Forearm 3 days              Drain  Duration             Ileostomy RUQ 51 days    Urethral Catheter Three way 18 Fr. 11 days    NG/OG/Enteral Tube Nasogastric 18 Fr Right nare 8 days    Abscess Drain Buttock 1 day              Airway  Duration             Surgical Airway Shiley Cuffed 30 days                    Physical Exam:   Gen:    Ill-appearing  CV:      warm, well-perfused  Lungs: S (CMV) 16/400/6/40  Abd:     IR drain purulent, VAC brown liquid, wound manager liquid and semisolid stool, NG tube with TF@45, abdomen soft nondistended  Ext:      no CCE  Neuro: Opens eyes but does not follow commands    "

## 2024-04-11 NOTE — UTILIZATION REVIEW
Continued Stay Review    Date: 4/11                          Current Patient Class: IP   Current Level of Care: MICU     HPI:59 y.o. female initially admitted on  1/10    Assessment/Plan:     4/11 Critical Care Note   Repeat MRI brain on 4/10 (obtained due to concern for acute decreased attenuation of the left hemisphere) confirms likely [septic] embolic strokes from prior scans + several new small areas of infarction + a small new hemorrhage in the left inferior parietal lobe. Electrolytes, sodium, hyperglycemia 2/2 high dose steroids requiring insulin gtt. Ammonia normal. Plan corticosteroids now weaned off . CRRT off with persistent uremia   Follow up serial blood cultures - positive for yeast  SATURNINO requested to assess for endocarditis in light of brain MRI concerning for septic emboli in setting of fungemia. Patient does have a PFO on bubble study with a DVT in the RLE which also contributes to stroke risk. Concern for UGI bleed . Transfusion 4/8 . lavage NGT to assess for upper GI bleed as EGD did reveal ulcerations .     4/11 Neuro Note   MRI 4/10/24: As seen on the MRI from 4 days ago, there are small scattered infarcts within the cerebral hemispheres bilaterally suggesting a central embolic source. Small new infarcts are seen within the left inferior parietal lobe with a small acute hemorrhage noted, as seen on yesterday's CT scan. Findings are suggestive of additional small infarcts with focal hemorrhagic transformation. Stable postoperative appearance of the middle cranial fossa on the left status post temporal lobectomy. Complete opacification of the mastoid temporal bones bilaterally is again seen.  LP 4/10/24: WBC 1, (+) Xanthochromia, protein 59, glucose 124, ME negative . Plan pending CSF studies . Cont vimpat , provigil , delirium precautions , monitor for neuro changes.   Cont neuro checks . Pt remains nonverbal . Opens eyes to sternal rub , but does not follow commands . Diminihed reflexes  throughout .   4/11 General surgery Note   COVID/strep pna, B cell lymphoma on rituxumab, CKD; prolonged hospitalization 2/2 acute hypoxic respiratory failure s/p MICU bedside trach, hospitalization complicated by cecal volvulus s/p ileocectomy, mucus fistula, end ileostomy on 3/13, anticipated poor wound healing on prolonged high dose steroids and neutropenia; candidemia with noted UGI bleed s/p EGD with esophageal ulcerations per GI . S/p Or 4/4 for midline wound exploration with mucus fistula and end ileostomy .Course complicated by pelvic abscess s/p IR drain placement on 4/10 and persistent candidemia .DVT with presumed paradoxical emboli.Multiple embolic strokes. 4/10 IR cx +1 yeast . 4/10 lumbar puncture . No organisms, xanthochromia .     Vital Signs:   04/11/24 0900 -- 111 Abnormal  22 103/61 77 98 % -- Ventilator Lying   04/11/24 0804 -- -- -- -- -- 98 % -- -- --   04/11/24 0800 -- 109 Abnormal  25 Abnormal  99/56 72 98 % -- -- --   04/11/24 0700 99.1 °F (37.3 °C) 110 Abnormal  27 Abnormal  105/57 75 97 % -- Ventilator Lying   04/11/24 0500 -- 110 Abnormal  28 Abnormal  101/59 74 97 % -- -- --   04/11/24 0400 99 °F (37.2 °C) 117 Abnormal  30 Abnormal  112/58 79 98 % -- Ventilator Lying   04/11/24 0300 -- 109 Abnormal  21 109/56 79 97 % -- -- --   04/11/24 0200 -- 109 Abnormal  21 108/55 76 98 % -- -- --   04/11/24 0100 -- 107 Abnormal  22 111/57 79 100 % -- -- --   04/11/24 0000 98.2 °F (36.8 °C) 104 24 Abnormal  113/67 84 100 % -- Ventilator Lyin       Pertinent Labs/Diagnostic Results:   4/10 MRI Brain As seen on the MRI from 4 days ago, there are small scattered infarcts within the cerebral hemispheres bilaterally suggesting a central embolic source.   Small new infarcts are seen within the left inferior parietal lobe with a small acute hemorrhage noted, as seen on yesterday's CT scan. Findings are suggestive of additional small infarcts with focal hemorrhagic transformation.   Stable postoperative  appearance of the middle cranial fossa on the left status post temporal lobectomy.   Complete opacification of the mastoid temporal bones bilaterally is again seen.   4/10 RUQ US The common bile duct is not dilated. Mild perihepatic ascites.   Shadowing calculi in the right kidney   Gallbladder sludge with wall thickening but no gallstones or sonographic Bains's sign wall thickening can be associated with hepatic pathology. Correlation with any right upper quadrant pain is advised..   Mild perihepatic ascites.  4/10 IR drainage tube placement  , lumbar puncture   CT-guided pelvic 10 [place] drain placement yielding pus, transgluteal approach     CT-guided lumbar puncture, abnormal yellow tinge to the CSF fluid  Opening pressure 24 cm H2O, unclear if this is a reliable indicator of elevated pressures given prone positioning, paralysis, positive pressure ventilation  Results from last 7 days   Lab Units 04/11/24  0502 04/10/24  2134 04/10/24  1800 04/10/24  0543 04/09/24  1652   WBC Thousand/uL 15.18* 20.41* 16.84* 19.87* 18.56*   HEMOGLOBIN g/dL 8.1* 9.0* 8.5* 9.5* 8.7*   HEMATOCRIT % 24.2* 27.0* 25.3* 27.8* 25.0*   PLATELETS Thousands/uL 58* 82* 93* 46* 39*   BANDS PCT % 12* 8 10* 18* 30*         Results from last 7 days   Lab Units 04/11/24  0502 04/10/24  0859 04/10/24  0543 04/09/24  0446 04/08/24  0505 04/07/24  1834 04/07/24  0508 04/06/24  1814 04/06/24  0503 04/06/24  0100 04/06/24  0031 04/05/24  1841   SODIUM mmol/L 144  --  141 139 141 137 138   < > 137   < >  --  141   POTASSIUM mmol/L 3.7  --  3.7 4.4 4.4 4.4 4.9   < > 4.8   < >  --  4.7   CHLORIDE mmol/L 111*  --  107 106 107 106 105   < > 104   < >  --  108   CO2 mmol/L 19*  --  20* 19* 22 21 21   < > 20*   < >  --  20*   ANION GAP mmol/L 14*  --  14* 14* 12 10 12   < > 13   < >  --  13   BUN mg/dL 65*  --  62* 60* 55* 52* 46*   < > 40*   < >  --  51*   CREATININE mg/dL 1.19  --  1.13 1.02 0.86 0.84 0.79   < > 0.62   < >  --  0.76   EGFR ml/min/1.73sq  m 50  --  53 60 74 76 82   < > 99   < >  --  86   CALCIUM mg/dL 9.9  --  10.2 10.2 10.2 10.0 10.2   < > 10.0   < >  --  9.8   CALCIUM, IONIZED mmol/L  --  1.45*  --   --  1.46*  --   --   --  1.43*  --  1.41* 1.41*   MAGNESIUM mg/dL 2.0  --  1.9 1.9 2.0  --  1.9  --  2.0   < >  --  1.9   PHOSPHORUS mg/dL 5.6*  --  5.7* 5.4* 4.8*  --  4.6*  --  3.9   < >  --  4.5    < > = values in this interval not displayed.     Results from last 7 days   Lab Units 04/11/24  0502 04/10/24  0543 04/09/24  0446 04/07/24  0508 04/05/24  0522   AST U/L 21 24 23 24 20   ALT U/L 38 42 35 37 32   ALK PHOS U/L 786* 918* 759* 900* 346*   TOTAL PROTEIN g/dL 4.1* 4.2* 4.3* 4.3* 5.2*   ALBUMIN g/dL 2.1* 2.3* 2.4* 2.3* 2.4*   TOTAL BILIRUBIN mg/dL 1.45* 1.85* 2.09* 1.50* 1.42*   BILIRUBIN DIRECT mg/dL  --   --  1.37* 1.00* 0.88*     Results from last 7 days   Lab Units 04/11/24  0852 04/11/24  0502 04/11/24  0008 04/10/24  1754 04/10/24  1203 04/10/24  0833 04/10/24  0553 04/09/24  2334 04/09/24  1707 04/09/24  1146 04/09/24  1023 04/09/24  0734   POC GLUCOSE mg/dl 128 145* 155* 204* 248* 202* 180* 171* 158* 162* 182* 254*     Results from last 7 days   Lab Units 04/11/24  0502 04/10/24  0543 04/09/24  0446 04/08/24  0505 04/07/24  1834 04/07/24  0508 04/06/24  1814 04/06/24  0503 04/06/24  0100 04/05/24  1841 04/05/24  1158 04/05/24  0522   GLUCOSE RANDOM mg/dL 132 166* 273* 107 187* 249* 203* 144* 140 127 98 92     Beta- Hydroxybutyrate   Date Value Ref Range Status   04/09/2024 1.28 (H) 0.02 - 0.27 mmol/L Final   04/03/2024 1.10 (H) 0.02 - 0.27 mmol/L Final      Results from last 7 days   Lab Units 04/08/24  1155   PH ART  7.339*   PCO2 ART mm Hg 37.8   PO2 ART mm Hg 92.8   HCO3 ART mmol/L 19.9*   BASE EXC ART mmol/L -5.4   O2 CONTENT ART mL/dL 9.6*   O2 HGB, ARTERIAL % 95.5   ABG SOURCE  Radial, Right     Results from last 7 days   Lab Units 04/10/24  0545   PH NICA  7.275*   PCO2 NICA mm Hg 40.2*   PO2 NICA mm Hg 56.1*   HCO3 NICA mmol/L 18.3*    BASE EXC NICA mmol/L -8.0   O2 CONTENT NICA ml/dL 12.3   O2 HGB, VENOUS % 86.5*     Results from last 7 days   Lab Units 04/05/24  1158   CK TOTAL U/L 25*     Results from last 7 days   Lab Units 04/09/24  0446 04/08/24  0505 04/07/24  0508   PROTIME seconds 17.0* 16.4* 17.0*   INR  1.40* 1.34* 1.40*     Results from last 7 days   Lab Units 04/06/24  0503   TSH 3RD GENERATON uIU/mL 0.017*     Results from last 7 days   Lab Units 04/05/24  0522   PROCALCITONIN ng/ml 5.61*     Results from last 7 days   Lab Units 04/10/24  0858 04/09/24  1037 04/04/24  2312 04/04/24  1001   LACTIC ACID mmol/L 0.9 2.3* 0.7 1.1       Results from last 7 days   Lab Units 04/11/24  0555 04/09/24  0555 04/05/24  0555   UNIT PRODUCT CODE  [id number] [id number]  T9161N95  R2569X48 [id number]  R0239Q95  V3006X07   UNIT NUMBER  [id number]  [id number]  [id number]  [id number]  [id number]   UNITABO  A A  A  O A  A  A   UNITRH  NEG NEG  NEG  POS NEG  POS  NEG   CROSSMATCH   --  Compatible  Compatible Compatible  Compatible   UNIT DISPENSE STATUS  Presumed Trans Presumed Trans  Presumed Trans  Presumed Trans Presumed Trans  Presumed Trans  Presumed Trans   UNIT PRODUCT VOL mL 241 350  350  300 350  250  350       Results from last 7 days   Lab Units 04/07/24  0508   CRP mg/L 165.6*   SED RATE mm/hour 48*         Results from last 7 days   Lab Units 04/06/24  0021   CLARITY UA  Extra Turbid   COLOR UA  Yellow   SPEC GRAV UA  1.024   PH UA  6.0   GLUCOSE UA mg/dl Negative   KETONES UA mg/dl Trace*   BLOOD UA  Large*   PROTEIN UA mg/dl 200 (2+)*   NITRITE UA  Negative   BILIRUBIN UA  Negative   UROBILINOGEN UA (BE) mg/dl <2.0   LEUKOCYTES UA  Large*   WBC UA /hpf Innumerable*   RBC UA /hpf Innumerable*   BACTERIA UA /hpf Moderate*   EPITHELIAL CELLS WET PREP /hpf None Seen       Results from last 7 days   Lab Units 04/10/24  2032 04/10/24  0542 04/09/24  0631 04/09/24  0447  04/06/24  0453 04/06/24  0021   BLOOD CULTURE   --  No Growth at 24 hrs.  No Growth at 24 hrs.  --   --  No Growth After 4 Days.  Candida albicans*  --    GRAM STAIN RESULT  No polys seen*  1+ Yeast*  --  Budding Yeast with Pseudomycelia* Budding Yeast with Pseudomycelia* Budding Yeast with Pseudomycelia*  --    URINE CULTURE   --   --   --   --   --  >100,000 cfu/ml Candida albicans*     Results from last 7 days   Lab Units 04/10/24  2016   TOTAL COUNTED  100     Results from last 7 days   Lab Units 04/10/24  2016   APPEARANCE CSF  CLEAR   TUBE NUM CSF  4   WBC CSF /uL 1   XANTHOCHROMIA  Yes*   NEUTROPHILS % (CSF) % 60   LYMPHS % (CSF) % 24   MONOCYTES % (CSF) % 16   GLUCOSE CSF mg/dL 124*   PROTEIN CSF mg/dL 59*           Medications:   Scheduled Medications:  chlorhexidine, 15 mL, Mouth/Throat, Q12H SARTHAK  fluconazole, 800 mg, Intravenous, Q24H  insulin lispro, 1-6 Units, Subcutaneous, Q6H SARTHAK  lacosamide, 100 mg, Per NG Tube, Q12H SARTHAK  minocycline, 200 mg, Per NG Tube, Q12H  nystatin, , Topical, BID  omeprazole (PRILOSEC) suspension 2 mg/mL, 20 mg, Per NG Tube, Daily  piperacillin-tazobactam, 4.5 g, Intravenous, Q8H  polyethylene glycol, 17 g, Per NG Tube, Daily  sodium chloride, 2 spray, Each Nare, BID  sodium chloride, 4 mL, Nebulization, TID  sulfamethoxazole-trimethoprim, 1 tablet, Per NG Tube, Once per day on Monday Wednesday Friday      Continuous IV Infusions:  dexmedetomidine, 0.1-0.7 mcg/kg/hr, Intravenous, Titrated      PRN Meds:  acetaminophen, 650 mg, Oral, Q6H PRN  fentaNYL, 50 mcg, Intravenous, Q1H PRN  ondansetron, 4 mg, Intravenous, Q6H PRN  sodium chloride, 1 Application, Nasal, Q1H PRN        Discharge Plan: D     Network Utilization Review Department  ATTENTION: Please call with any questions or concerns to [phone] and carefully listen to the prompts so that you are directed to the right person. All voicemails are confidential.   For Discharge needs, contact Care Management DC Support  Team at [phone] opt. 2  Send all requests for admission clinical reviews, approved or denied determinations and any other requests to dedicated fax number below belonging to the campus where the patient is receiving treatment. List of dedicated fax numbers for the Facilities:  FACILITY NAME UR FAX NUMBER   ADMISSION DENIALS (Administrative/Medical Necessity) [phone]   DISCHARGE SUPPORT TEAM (NETWORK) [phone]   PARENT CHILD HEALTH (Maternity/NICU/Pediatrics) [phone]   [facility] [phone]   [facility] [phone]   [facility] [phone]   [facility] [phone]   [facility] [phone]   [facility] [phone]   [facility] [phone]   [facility] [phone]   [facility] [phone]   [facility] [phone]   [facility] [phone]   [facility] [phone]

## 2024-04-11 NOTE — SOCIAL WORK
The Palliative SW and Provider NEGRITA Ramirez met with the pt  at bedside.  The pt  is very concerned about the pt infection in her stomach.  He expressed his frustration and expressed he would like a plan in place to address her infection.  He is aware she will be having a procedure today at 11am.    His concerns were shared with the trauma and surgery teams.  The pt  is hopeful to hear from either team today.  He is aware they are discussing how to best address the pt needs.  He was informed yesterday that the pt has a lot of inflammation and correcting the ostomy at this time would be a challenge.    The pt is opening her eyes.  She is not following any commands.      I have spent 30 minutes with Family today in which greater than 50% of this time was spent in counseling/coordination of care     Palliative  will follow-up as requested by patient, family, and primary team.  Please contact with any specific requests

## 2024-04-11 NOTE — PROCEDURES
Bronchoscopy (Bedside)    Date/Time: 4/11/2024 11:00 AM    Performed by: Clinton Mays MD  Authorized by: Clinton Mays MD    Patient location:  Bedside  Other Assisting Provider: No    Consent:     Consent obtained:  Verbal    Consent given by:  Spouse    Risks discussed:  Bleeding    Alternatives discussed:  No treatment  Universal protocol:     Procedure explained and questions answered to patient or proxy's satisfaction: yes      Patient identity confirmed:  Arm band  Indications:     Procedure Purpose: diagnostic      Indications: other (comment)      Indications comment:  To assist with bedside transesophageal echocardiogram  Sedation:     Sedation type:  Anxiolysis  Upper Airway:     Vocal cords movement comment:  Not visualized - patient has tracheostomy  Airway:     Airway:  Entered airway through left nare.  Nasal cavity appears normal without polyps or active bleeding.  Epiglottis visualized and appeared normal.  The arytenoids appears edematous.  Nasogastric tube appears to be entering the esophagus at the right lateral aspect of the arytenoids.  I can see the transesophageal echocardiogram attempting to follow the nasogastric tube but cannot see the esophageal opening.  The SATURNINO probe appears to be abutting against edematous soft tissue right lateral of the arytenoids.  With further distal insertion of the SATURNINO there is mild mucosal irration and mild bleeding.      After that the bronchoscope was advanced between the vocal cords and the superior aspect of the tracheostomy tube along with the cuff was visualized.  Some white mucus superior to that was suctioned.    Post-procedure details:     Patient tolerance of procedure:  Tolerated well, no immediate complications    Complication (if applicable):  None  Final Diagnosis/Findings:      Cannot visualize esophageal aperture.  SATURNINO aborted due to risk of esophageal injury

## 2024-04-11 NOTE — ANESTHESIA POSTPROCEDURE EVALUATION
Post-Op Assessment Note    CV Status:  Stable (ON NE GTT)  Pain Score: 0    Pain management: adequate       Post-procedure mental status: SEDATED.   PONV Controlled:  None   Airway patency: ETT.     Post Op Vitals Reviewed: Yes    No anethesia notable event occurred.    Staff: Anesthesiologist, CRNA               BP   130/77   Temp      Pulse  101   Resp   14   SpO2   100   REPORT GIVEN TO ICU, PT CV, VSS ON MONITORS. RN TRANSPORTING PT W/ RESPIRATORY.

## 2024-04-12 LAB
ALBUMIN SERPL BCP-MCNC: 2.5 G/DL (ref 3.5–5)
ALP SERPL-CCNC: 845 U/L (ref 34–104)
ALT SERPL W P-5'-P-CCNC: 39 U/L (ref 7–52)
AMMONIA PLAS-SCNC: 91 UMOL/L (ref 18–72)
ANION GAP SERPL CALCULATED.3IONS-SCNC: 15 MMOL/L (ref 4–13)
ANION GAP SERPL CALCULATED.3IONS-SCNC: 18 MMOL/L (ref 4–13)
ANISOCYTOSIS BLD QL SMEAR: PRESENT
ANISOCYTOSIS BLD QL SMEAR: PRESENT
AST SERPL W P-5'-P-CCNC: 22 U/L (ref 13–39)
B-OH-BUTYR SERPL-MCNC: 1.24 MMOL/L (ref 0.02–0.27)
BACTERIA BLD CULT: ABNORMAL
BACTERIA SPEC ANAEROBE CULT: NORMAL
BACTERIA SPEC BFLD CULT: ABNORMAL
BASOPHILS # BLD MANUAL: 0 THOUSAND/UL (ref 0–0.1)
BASOPHILS # BLD MANUAL: 0.3 THOUSAND/UL (ref 0–0.1)
BASOPHILS NFR MAR MANUAL: 0 % (ref 0–1)
BASOPHILS NFR MAR MANUAL: 2 % (ref 0–1)
BILIRUB SERPL-MCNC: 1.84 MG/DL (ref 0.2–1)
BUN SERPL-MCNC: 65 MG/DL (ref 5–25)
BUN SERPL-MCNC: 66 MG/DL (ref 5–25)
BURR CELLS BLD QL SMEAR: PRESENT
BURR CELLS BLD QL SMEAR: PRESENT
CALCIUM ALBUM COR SERPL-MCNC: 11.4 MG/DL (ref 8.3–10.1)
CALCIUM SERPL-MCNC: 10 MG/DL (ref 8.4–10.2)
CALCIUM SERPL-MCNC: 10.2 MG/DL (ref 8.4–10.2)
CHLORIDE SERPL-SCNC: 111 MMOL/L (ref 96–108)
CHLORIDE SERPL-SCNC: 116 MMOL/L (ref 96–108)
CO2 SERPL-SCNC: 18 MMOL/L (ref 21–32)
CO2 SERPL-SCNC: 18 MMOL/L (ref 21–32)
CREAT SERPL-MCNC: 1.26 MG/DL (ref 0.6–1.3)
CREAT SERPL-MCNC: 1.27 MG/DL (ref 0.6–1.3)
DACRYOCYTES BLD QL SMEAR: PRESENT
EOSINOPHIL # BLD MANUAL: 0 THOUSAND/UL (ref 0–0.4)
EOSINOPHIL # BLD MANUAL: 0 THOUSAND/UL (ref 0–0.4)
EOSINOPHIL NFR BLD MANUAL: 0 % (ref 0–6)
EOSINOPHIL NFR BLD MANUAL: 0 % (ref 0–6)
ERYTHROCYTE [DISTWIDTH] IN BLOOD BY AUTOMATED COUNT: 20.5 % (ref 11.6–15.1)
ERYTHROCYTE [DISTWIDTH] IN BLOOD BY AUTOMATED COUNT: 20.6 % (ref 11.6–15.1)
FUNGUS SPEC CULT: ABNORMAL
GFR SERPL CREATININE-BSD FRML MDRD: 46 ML/MIN/1.73SQ M
GFR SERPL CREATININE-BSD FRML MDRD: 47 ML/MIN/1.73SQ M
GLUCOSE SERPL-MCNC: 120 MG/DL (ref 65–140)
GLUCOSE SERPL-MCNC: 132 MG/DL (ref 65–140)
GLUCOSE SERPL-MCNC: 132 MG/DL (ref 65–140)
GLUCOSE SERPL-MCNC: 139 MG/DL (ref 65–140)
GLUCOSE SERPL-MCNC: 143 MG/DL (ref 65–140)
GLUCOSE SERPL-MCNC: 150 MG/DL (ref 65–140)
GLUCOSE SERPL-MCNC: 221 MG/DL (ref 65–140)
GLUCOSE SERPL-MCNC: 242 MG/DL (ref 65–140)
GLUCOSE SERPL-MCNC: 243 MG/DL (ref 65–140)
GRAM STN SPEC: ABNORMAL
HCT VFR BLD AUTO: 22.5 % (ref 34.8–46.1)
HCT VFR BLD AUTO: 27.3 % (ref 34.8–46.1)
HELMET CELLS BLD QL SMEAR: PRESENT
HGB BLD-MCNC: 7.5 G/DL (ref 11.5–15.4)
HGB BLD-MCNC: 8.8 G/DL (ref 11.5–15.4)
LYMPHOCYTES # BLD AUTO: 0 % (ref 14–44)
LYMPHOCYTES # BLD AUTO: 0 THOUSAND/UL (ref 0.6–4.47)
LYMPHOCYTES # BLD AUTO: 0.45 THOUSAND/UL (ref 0.6–4.47)
LYMPHOCYTES # BLD AUTO: 3 % (ref 14–44)
MAGNESIUM SERPL-MCNC: 2 MG/DL (ref 1.9–2.7)
MCH RBC QN AUTO: 29.1 PG (ref 26.8–34.3)
MCH RBC QN AUTO: 29.9 PG (ref 26.8–34.3)
MCHC RBC AUTO-ENTMCNC: 32.2 G/DL (ref 31.4–37.4)
MCHC RBC AUTO-ENTMCNC: 33.3 G/DL (ref 31.4–37.4)
MCV RBC AUTO: 90 FL (ref 82–98)
MCV RBC AUTO: 90 FL (ref 82–98)
METAMYELOCYTES NFR BLD MANUAL: 4 % (ref 0–1)
MICROCYTES BLD QL AUTO: PRESENT
MONOCYTES # BLD AUTO: 0.32 THOUSAND/UL (ref 0–1.22)
MONOCYTES # BLD AUTO: 0.6 THOUSAND/UL (ref 0–1.22)
MONOCYTES NFR BLD: 2 % (ref 4–12)
MONOCYTES NFR BLD: 4 % (ref 4–12)
MYELOCYTES NFR BLD MANUAL: 1 % (ref 0–1)
NEUTROPHILS # BLD MANUAL: 13.6 THOUSAND/UL (ref 1.85–7.62)
NEUTROPHILS # BLD MANUAL: 14.71 THOUSAND/UL (ref 1.85–7.62)
NEUTS BAND NFR BLD MANUAL: 13 % (ref 0–8)
NEUTS BAND NFR BLD MANUAL: 23 % (ref 0–8)
NEUTS SEG NFR BLD AUTO: 70 % (ref 43–75)
NEUTS SEG NFR BLD AUTO: 78 % (ref 43–75)
NRBC BLD AUTO-RTO: 4 /100 WBC (ref 0–2)
NRBC BLD AUTO-RTO: 6 /100 WBC (ref 0–2)
OVALOCYTES BLD QL SMEAR: PRESENT
PHOSPHATE SERPL-MCNC: 6.3 MG/DL (ref 2.7–4.5)
PLATELET # BLD AUTO: 58 THOUSANDS/UL (ref 149–390)
PLATELET # BLD AUTO: 62 THOUSANDS/UL (ref 149–390)
PLATELET BLD QL SMEAR: ABNORMAL
PLATELET BLD QL SMEAR: ABNORMAL
POIKILOCYTOSIS BLD QL SMEAR: PRESENT
POIKILOCYTOSIS BLD QL SMEAR: PRESENT
POLYCHROMASIA BLD QL SMEAR: PRESENT
POTASSIUM SERPL-SCNC: 3.1 MMOL/L (ref 3.5–5.3)
POTASSIUM SERPL-SCNC: 3.7 MMOL/L (ref 3.5–5.3)
PROT SERPL-MCNC: 4.5 G/DL (ref 6.4–8.4)
RBC # BLD AUTO: 2.51 MILLION/UL (ref 3.81–5.12)
RBC # BLD AUTO: 3.02 MILLION/UL (ref 3.81–5.12)
RBC MORPH BLD: PRESENT
RBC MORPH BLD: PRESENT
RHODAMINE-AURAMINE STN SPEC: NORMAL
SCHISTOCYTES BLD QL SMEAR: PRESENT
SODIUM SERPL-SCNC: 147 MMOL/L (ref 135–147)
SODIUM SERPL-SCNC: 149 MMOL/L (ref 135–147)
TARGETS BLD QL SMEAR: PRESENT
WBC # BLD AUTO: 14.95 THOUSAND/UL (ref 4.31–10.16)
WBC # BLD AUTO: 15.82 THOUSAND/UL (ref 4.31–10.16)
WBC TOXIC VACUOLES BLD QL SMEAR: PRESENT

## 2024-04-12 PROCEDURE — 94640 AIRWAY INHALATION TREATMENT: CPT

## 2024-04-12 PROCEDURE — 80048 BASIC METABOLIC PNL TOTAL CA: CPT | Performed by: INTERNAL MEDICINE

## 2024-04-12 PROCEDURE — 84100 ASSAY OF PHOSPHORUS: CPT

## 2024-04-12 PROCEDURE — 94003 VENT MGMT INPAT SUBQ DAY: CPT

## 2024-04-12 PROCEDURE — 85027 COMPLETE CBC AUTOMATED: CPT

## 2024-04-12 PROCEDURE — 87040 BLOOD CULTURE FOR BACTERIA: CPT | Performed by: STUDENT IN AN ORGANIZED HEALTH CARE EDUCATION/TRAINING PROGRAM

## 2024-04-12 PROCEDURE — 85007 BL SMEAR W/DIFF WBC COUNT: CPT

## 2024-04-12 PROCEDURE — 87106 FUNGI IDENTIFICATION YEAST: CPT | Performed by: STUDENT IN AN ORGANIZED HEALTH CARE EDUCATION/TRAINING PROGRAM

## 2024-04-12 PROCEDURE — 82140 ASSAY OF AMMONIA: CPT | Performed by: INTERNAL MEDICINE

## 2024-04-12 PROCEDURE — 99233 SBSQ HOSP IP/OBS HIGH 50: CPT | Performed by: STUDENT IN AN ORGANIZED HEALTH CARE EDUCATION/TRAINING PROGRAM

## 2024-04-12 PROCEDURE — 83735 ASSAY OF MAGNESIUM: CPT

## 2024-04-12 PROCEDURE — 80053 COMPREHEN METABOLIC PANEL: CPT

## 2024-04-12 PROCEDURE — 82948 REAGENT STRIP/BLOOD GLUCOSE: CPT

## 2024-04-12 PROCEDURE — 94760 N-INVAS EAR/PLS OXIMETRY 1: CPT

## 2024-04-12 PROCEDURE — 99232 SBSQ HOSP IP/OBS MODERATE 35: CPT | Performed by: NURSE PRACTITIONER

## 2024-04-12 PROCEDURE — 99024 POSTOP FOLLOW-UP VISIT: CPT | Performed by: SURGERY

## 2024-04-12 PROCEDURE — 99291 CRITICAL CARE FIRST HOUR: CPT | Performed by: INTERNAL MEDICINE

## 2024-04-12 PROCEDURE — 82010 KETONE BODYS QUAN: CPT | Performed by: INTERNAL MEDICINE

## 2024-04-12 RX ORDER — POTASSIUM CHLORIDE 20MEQ/15ML
40 LIQUID (ML) ORAL ONCE
Status: COMPLETED | OUTPATIENT
Start: 2024-04-12 | End: 2024-04-12

## 2024-04-12 RX ORDER — LACTULOSE 10 G/15ML
20 SOLUTION ORAL 3 TIMES DAILY
Status: DISCONTINUED | OUTPATIENT
Start: 2024-04-12 | End: 2024-04-14

## 2024-04-12 RX ADMIN — POLYETHYLENE GLYCOL 3350 17 G: 17 POWDER, FOR SOLUTION ORAL at 09:18

## 2024-04-12 RX ADMIN — POTASSIUM CHLORIDE 40 MEQ: 1.5 SOLUTION ORAL at 09:19

## 2024-04-12 RX ADMIN — CHLORHEXIDINE GLUCONATE 0.12% ORAL RINSE 15 ML: 1.2 LIQUID ORAL at 22:26

## 2024-04-12 RX ADMIN — LACOSAMIDE 100 MG: 100 TABLET, FILM COATED ORAL at 22:26

## 2024-04-12 RX ADMIN — PIPERACILLIN SODIUM AND TAZOBACTAM SODIUM 4.5 G: 36; 4.5 INJECTION, POWDER, LYOPHILIZED, FOR SOLUTION INTRAVENOUS at 16:58

## 2024-04-12 RX ADMIN — NYSTATIN: 100000 POWDER TOPICAL at 09:18

## 2024-04-12 RX ADMIN — HEPARIN SODIUM 5000 UNITS: 5000 INJECTION INTRAVENOUS; SUBCUTANEOUS at 06:05

## 2024-04-12 RX ADMIN — CHLORHEXIDINE GLUCONATE 0.12% ORAL RINSE 15 ML: 1.2 LIQUID ORAL at 10:38

## 2024-04-12 RX ADMIN — LACTULOSE 20 G: 20 SOLUTION ORAL at 16:58

## 2024-04-12 RX ADMIN — LACOSAMIDE 100 MG: 100 TABLET, FILM COATED ORAL at 10:34

## 2024-04-12 RX ADMIN — SODIUM CHLORIDE SOLN NEBU 3% 4 ML: 3 NEBU SOLN at 07:47

## 2024-04-12 RX ADMIN — SULFAMETHOXAZOLE AND TRIMETHOPRIM 1 TABLET: 800; 160 TABLET ORAL at 09:22

## 2024-04-12 RX ADMIN — NYSTATIN: 100000 POWDER TOPICAL at 18:14

## 2024-04-12 RX ADMIN — IOHEXOL 85 ML: 350 INJECTION, SOLUTION INTRAVENOUS at 10:21

## 2024-04-12 RX ADMIN — POTASSIUM CHLORIDE 40 MEQ: 1.5 SOLUTION ORAL at 10:37

## 2024-04-12 RX ADMIN — SODIUM CHLORIDE SOLN NEBU 3% 4 ML: 3 NEBU SOLN at 14:39

## 2024-04-12 RX ADMIN — PIPERACILLIN SODIUM AND TAZOBACTAM SODIUM 4.5 G: 36; 4.5 INJECTION, POWDER, LYOPHILIZED, FOR SOLUTION INTRAVENOUS at 01:05

## 2024-04-12 RX ADMIN — Medication 2 SPRAY: at 09:21

## 2024-04-12 RX ADMIN — INSULIN LISPRO 2 UNITS: 100 INJECTION, SOLUTION INTRAVENOUS; SUBCUTANEOUS at 18:12

## 2024-04-12 RX ADMIN — INSULIN LISPRO 3 UNITS: 100 INJECTION, SOLUTION INTRAVENOUS; SUBCUTANEOUS at 12:40

## 2024-04-12 RX ADMIN — HEPARIN SODIUM 5000 UNITS: 5000 INJECTION INTRAVENOUS; SUBCUTANEOUS at 13:39

## 2024-04-12 RX ADMIN — Medication 800 MG: at 10:46

## 2024-04-12 RX ADMIN — LACTULOSE 20 G: 20 SOLUTION ORAL at 10:37

## 2024-04-12 RX ADMIN — Medication 2 SPRAY: at 22:27

## 2024-04-12 RX ADMIN — Medication 20 MG: at 10:34

## 2024-04-12 RX ADMIN — LACTULOSE 20 G: 20 SOLUTION ORAL at 22:26

## 2024-04-12 RX ADMIN — SODIUM CHLORIDE SOLN NEBU 3% 4 ML: 3 NEBU SOLN at 19:35

## 2024-04-12 RX ADMIN — HEPARIN SODIUM 5000 UNITS: 5000 INJECTION INTRAVENOUS; SUBCUTANEOUS at 22:26

## 2024-04-12 RX ADMIN — PIPERACILLIN SODIUM AND TAZOBACTAM SODIUM 4.5 G: 36; 4.5 INJECTION, POWDER, LYOPHILIZED, FOR SOLUTION INTRAVENOUS at 10:40

## 2024-04-12 RX ADMIN — INSULIN LISPRO 1 UNITS: 100 INJECTION, SOLUTION INTRAVENOUS; SUBCUTANEOUS at 06:05

## 2024-04-12 RX ADMIN — FENTANYL CITRATE 50 MCG: 50 INJECTION INTRAMUSCULAR; INTRAVENOUS at 15:10

## 2024-04-12 NOTE — PLAN OF CARE
Problem: Prexisting or High Potential for Compromised Skin Integrity  Goal: Skin integrity is maintained or improved  Description: INTERVENTIONS:  - Identify patients at risk for skin breakdown  - Assess and monitor skin integrity  - Assess and monitor nutrition and hydration status  - Monitor labs   - Assess for incontinence   - Turn and reposition patient  - Assist with mobility/ambulation  - Relieve pressure over bony prominences  - Avoid friction and shearing  - Provide appropriate hygiene as needed including keeping skin clean and dry  - Evaluate need for skin moisturizer/barrier cream  - Collaborate with interdisciplinary team   - Patient/family teaching  - Consider wound care consult   Outcome: Progressing     Problem: SAFETY ADULT  Goal: Patient will remain free of falls  Description: INTERVENTIONS:  - Educate patient/family on patient safety including physical limitations  - Instruct patient to call for assistance with activity   - Consult OT/PT to assist with strengthening/mobility   - Keep Call bell within reach  - Keep bed low and locked with side rails adjusted as appropriate  - Keep care items and personal belongings within reach  - Initiate and maintain comfort rounds  - Make Fall Risk Sign visible to staff  - Offer Toileting every 2 Hours, in advance of need  - Initiate/Maintain bed alarm  - Obtain necessary fall risk management equipment: non skid footwear  - Apply yellow socks and bracelet for high fall risk patients  - Consider moving patient to room near nurses station  Outcome: Progressing     Problem: RESPIRATORY - ADULT  Goal: Achieves optimal ventilation and oxygenation  Description: INTERVENTIONS:  - Assess for changes in respiratory status  - Assess for changes in mentation and behavior  - Position to facilitate oxygenation and minimize respiratory effort  - Oxygen administered by appropriate delivery if ordered  - Initiate smoking cessation education as indicated  - Encourage  broncho-pulmonary hygiene including cough, deep breathe, Incentive Spirometry  - Assess the need for suctioning and aspirate as needed  - Assess and instruct to report SOB or any respiratory difficulty  - Respiratory Therapy support as indicated  Outcome: Progressing     Problem: SKIN/TISSUE INTEGRITY - ADULT  Goal: Pressure injury heals and does not worsen  Description: Interventions:  - Implement low air loss mattress or specialty surface (Criteria met)  - Apply silicone foam dressing  - Instruct/assist with weight shifting every 120 minutes when in chair   - Limit chair time to 2 hour intervals  - Consider nutrition services referral as needed  Outcome: Progressing     Problem: Potential for Falls  Goal: Patient will remain free of falls  Description: INTERVENTIONS:  - Educate patient/family on patient safety including physical limitations  - Instruct patient to call for assistance with activity   - Consult OT/PT to assist with strengthening/mobility   - Keep Call bell within reach  - Keep bed low and locked with side rails adjusted as appropriate  - Keep care items and personal belongings within reach  - Initiate and maintain comfort rounds  - Make Fall Risk Sign visible to staff  - Offer Toileting every 2 Hours, in advance of need  - Initiate/Maintain bed alarm  - Obtain necessary fall risk management equipment: non skid footwear  - Apply yellow socks and bracelet for high fall risk patients  - Consider moving patient to room near nurses station  Outcome: Progressing     Problem: Nutrition/Hydration-ADULT  Goal: Nutrient/Hydration intake appropriate for improving, restoring or maintaining nutritional needs  Description: Monitor and assess patient's nutrition/hydration status for malnutrition. Collaborate with interdisciplinary team and initiate plan and interventions as ordered.  Monitor patient's weight and dietary intake as ordered or per policy. Utilize nutrition screening tool and intervene as necessary.  Determine patient's food preferences and provide high-protein, high-caloric foods as appropriate.     INTERVENTIONS:  - Monitor oral intake, urinary output, labs, and treatment plans  - Assess nutrition and hydration status and recommend course of action  - Evaluate amount of meals eaten  - Assist patient with eating if necessary   - Allow adequate time for meals  - Recommend/ encourage appropriate diets, oral nutritional supplements, and vitamin/mineral supplements  - Order, calculate, and assess calorie counts as needed  - Recommend, monitor, and adjust tube feedings and TPN/PPN based on assessed needs  - Assess need for intravenous fluids  - Provide specific nutrition/hydration education as appropriate  - Include patient/family/caregiver in decisions related to nutrition  Outcome: Progressing     Problem: CARDIOVASCULAR - ADULT  Goal: Absence of cardiac dysrhythmias or at baseline rhythm  Description: INTERVENTIONS:  - Continuous cardiac monitoring, vital signs, obtain 12 lead EKG if ordered  - Administer antiarrhythmic and heart rate control medications as ordered  - Monitor electrolytes and administer replacement therapy as ordered  Outcome: Progressing     Problem: GASTROINTESTINAL - ADULT  Goal: Maintains adequate nutritional intake  Description: INTERVENTIONS:  - Monitor percentage of each meal consumed  - Identify factors contributing to decreased intake, treat as appropriate  - Assist with meals as needed  - Monitor I&O, weight, and lab values if indicated  - Obtain nutrition services referral as needed  Outcome: Progressing  Goal: Establish and maintain optimal ostomy function  Description: INTERVENTIONS:  - Assess bowel function  - Encourage oral fluids to ensure adequate hydration  - Administer IV fluids if ordered to ensure adequate hydration   - Administer ordered medications as needed  - Encourage mobilization and activity  - Nutrition services referral to assist patient with appropriate food  choices  - Assess stoma site  - Consider wound care consult   Outcome: Progressing

## 2024-04-12 NOTE — PROGRESS NOTES
Pastoral Care Progress Note    2024  Patient: Carla Hunt : 1965  Admission Date & Time: 1/10/2024 1941  MRN: [id number] [facility]: [id number]                     Chaplaincy Interventions Utilized:   Empowerment: Clarified, confirmed, or reviewed information from treatment team , Encouraged assertiveness, Encouraged focus on present, Encouraged self-care, Facilitated group experience, Normalized experience of patient/family, Provided anticipatory guidance, Provided anxiety containment, and Provided grief counseling    Exploration: Explored alternatives, Explored hope, Explored emotional needs & resources, Explored relational needs & resources, and Facilitated story telling    Collaboration: Advocated for patient/family and Consulted with interdisciplinary team    Relationship Building: Cultivated a relationship of care and support, Listened empathically, Hospitality, Provided relationship counseling, and Provided silent and supportive presence        Spiritual Coping Strategies Utilized:   Connectedness      This  attended family meeting for pt, in which pt's  Regan and dtr Cathryn were present. Post meeting, Regan and Cathryn re-entered room for emotional and spiritual support, provided by this  and pallaitive NEGRITA Ramirez and MSW/PhD Macie. As a team, we provided grief support, emotional processing mechanisms, encouragement, active listening, and compassionate presence. Dtr and  spoke very openly, cried, and verbally processed some of their complicated grief surrounding pt''s condition. Pt is deeply loved by family and family is quite bereft. This  and palliative team will continually provide support. Spiritual Care remains available.

## 2024-04-12 NOTE — PROGRESS NOTES
[facility]  Progress Note: Critical Care  Name: Carla Hunt 59 y.o. female I MRN: [id number]  Unit/Bed#: MICU 10 I Date of Admission: 1/10/2024   Date of Service: 4/12/2024 I Hospital Day: 93    Assessment/Plan   Neuro:  #Alertness & analgesia  - OFF modafinil 100mg qd as of 4/8  - Delirium precautions  - Patient is awake and opening eyes but not moving purposefully or responding to commands, will hold on any sedating medications at this time  - Hold analgesia and sedation aside from periprocedural requirements     #Metabolic encephalopathy; POA  - Waxing and waning neuro exam since admission  - Most recent repeat CTH 4/5 without acute intracranial abnormality - obtained due to decline in mental status after interval improvement  - MRI brain on 4/6 showed findings suggestive of embolic etiology without significant edema, mass effect or hemorrhagic transformation (suspect septic emboli)  - Repeat MRI brain on 4/10 (obtained due to concern for acute decreased attenuation of the left hemisphere) confirms likely [septic] embolic strokes from prior scans + several new small areas of infarction + a small new hemorrhage in the left inferior parietal lobe.  - Electrolytes, sodium, hyperglycemia 2/2 high dose steroids requiring insulin gtt. Ammonia normal. TME may be prolonged 2/2 PNA, possibly worsened by uremic encephalopathy worsened by ELIESER now resolved, uremia improving s/p CRRT   - Bcx 2/2 (3/31) growing fungemia, identified as candida albicans  - Cryptococcal ag negative  - Aspergillus galactomannan ag negative  - Tb quantiferon gold indeterminate 2/2 lymphopenia    --  Plan:  - Corticosteroids now weaned to off  - CRRT off with persistent uremia   - Follow up serial blood cultures - positive for yeast  - Plan for SATURNINO as below  - Possible concomitant intraabdominal infection related to ostomy (see ID A/P) contributing to TME in setting of other comorbidities affecting  mentation (embolic strokes, left inferior parietal IPH, prolonged critical illness with associated delirium)  - Appreciate Neurology input   - Will obtain CTA head/neck today per Neurology recs to assess for mycotic aneurysms as a contribution to the embolic strokes  - Ammonia elevated; start lactulose and titrate to 3-4 BM per day   - Can add rifaximin if needed  - Frequent neurochecks     #CVA due to multiple bilateral foci of acute infarcts   - MRI brain 4/6- multiple small bilateral foci of acute infarcts. No significant edema, mass effect, or hemorrhagic transformation -- suspicious for septic embolic phenomenon  - MRI brain on 4/10 showed known infarcts with several new small infarcts  - Not on AP/AC. Recent TTE 4/1 without vegetation   Plan:  - SATURNINO requested to assess for endocarditis in light of brain MRI concerning for septic emboli in setting of fungemia              - Patient does have a PFO on bubble study with a DVT in the RLE which also contributes to stroke risk   - SATURNINO aborted due to difficult access to esophagus despite visual aid by bronchoscope, will discuss with GI about possible [nonurgent] endoscopic assistance   - Avoid ASA due to small hemorraghic conversion of septic emboli  - Frequent neurochecks     #Seizure disorder, known history  - S/p partial left temporal lobe resection in 2007 2/2 complex migraines  - On Lamictal 150 bid and Topamax 50mg bid at home  - Last lamotrigine level from 03/02 at 10.7 (therapeutic)  - Home Lamictal switched to Vimpat 3/27 due to worsening pancytopenia, neuro following  - Continue Vimpat 100mg bid maintenance dose  - Seizure precautions      #Anxiety, known history  -On Effexor 12.5 mg TID     CV:  #Sinus tachycardia  - TTE 3/17 with no major structural dysfunction  - Multifactorial 2/2 deconditioning, dehydration/hypvol, acute on chronic anemia, underlying infectious/inflammatory process, pain/agitation  - TTE 4/1 with EF 70%, no WMA, no changes from  prior  - TTE with bubble study 4/7 showed LVEF 70% with right to left shunting related to PFO vs intrapulmonary shunt  - SATURNINO 4/11 as below  - Etiology likely secondary to infectious processes (fungemia, intra-abdominal infection related to ostomy, pneumonia) which are being addressed              - No need for beta blockade after discussion with Cardiology     Pulm:  #Acute hypoxic respiratory failure;  #Bilateral ground glass opacities, improving  - Vent dependent; s/p trach 3/12 after was reintubated 3/11 due to increased WOB  - Complicated by recurrent bacterial PNA treated w 10d levaquin (completed 2/25) for MDR PNA; most recent BAL +recurrent stenotrophomona treated with Bactrim, switched to minocyline 14d course completed 3/28.  - Etiology likely multifactorial including possible COVID pneumonitis vs recurrent PNA vs hypersensitivity pneumonitis 2/2 ?rituxumab. Persistent inflammation likely given patient has been steroid responsive throughout admission.   - Repeat CXR 3/22 with significant improvement of multifocal PNA. 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 2/2 poor wound healing midline incision  - Poor wound healing likely due to high dose steroid therapy s/p wound vac that was removed 3/17 by gen surg now s/p s/p OR 4/5 with surgically created mucus fistula and end ileostomy  Plan:  - Wound vac as  per general surgery  - ALP and GGT elevated; likely ADR of fluconazole              - RUQ without acute biliary abnormality  - CT abdomen/pelvis with concern for purulent material in the peritoneum and collection adjacent to the ostomy (after discussion with Surgery; read from Radiology notes hemoperitoneum)              - S/p IR drainage of fluid at same time as LP              - Follow up fluid cultures              - Appreciate Surgery assistance with this              - Zosyn as below  - Lactulose as above; titrate to 3-4 BM per day   - Can add rifaximin if needed   - Follow up NH3 in a few days pending mental status improvement  - Monitor ostomy pouch output  - ID following  - General surgery following     #Oropharyngeal dysphagia   - Has had multiple and prolonged intubations now s/p trach   - VBS 3/27 oropharyngeal dysphagia  - Continue tube feeds for now until potential PEG placement pending abdominal wound healing as per Gen Surg     #Esophagitis   - Acute on chronic anemia associated bloody NG output on 4/3  - Uremic, worsening suggestive of UGIB given relatively stable Cr  - Bedside EGD x2 (4/2,4/3) Ulcerated mucosa in the upper third of the esophagus without evidence of hemorrhage   Plan:  HSV/CMV/candida from GI sample negative  Continue PPI IV bid  Monitor NGT output, stool output  Concern for upper GI bleed given increasing BUN and decreased Hb (recently requiring transfusion 4/8 overnight) with thrombocytopenia and TEG normal; will lavage NGT if needed to assess for upper GI bleed as EGD did reveal ulcerations     :  #Uremia  - ?catabolic from steroids, may also have ?slow UGIB in setting of prolonged steroids though no overt s/s of GIB and H&H stable since 3/23 and patient is on ppi  - Trending up   - EGD without active source of bleeding but did note ulcerations  - FEurea suggestive of intrinsic pathology   - GFR largely stable, although slowly down trending  - Steroids now off  - Persistent off  CRRT              - See above re lavage NGT plan  - BMP daily, uremia currently stable   - Cr slowly trending up; may be falsely elevated due to prolonged course of Bactrim so will d/c and monitor Cr  - NH3 elevated; start lactulose +/- rifaximin as above        F/E/N:  F: intermittent IVF boluses as needed  E: monitor and replete for goal K>4, P>3, Mg>2  N: tube feeds with Nepro 45 cc/h, Prosource liquid protein to 1 packet, Refugio BID to support wound healing, 100cc FWF q6h         Heme/Onc:  #Pancytopenia, improving with intermittent plt/prbc's transfusion, s/p Filgastrim x3  - In setting of hx of B cell lymphoma, prior chemo, Rituxan therapy, prior abx and present meds including lamictal  - Hemo onc consulted, empirically given Filgastrim 300mg qd x3d (completed 3/30); IR bone marrow bx deferred by heme-onc   - Lamictal switched to Vimpat in consultation with neuro as above due to potential contribution to pancytopenia  - Trend CBC and diff daily, neutropenic precautions for ANC <500     #Acute on chronic anemia  - Baseline Hgb ~7-8. S/P 2U PRBCs 3/17 after repeat Hgb 5.3, total of 6U transfused since admission.  - Patient had significant blood loss from ostomy site 3/20, resulting in hemorraghic shock, improved with blood transfusion; hemostasis achieved after bleeding source identified and sutured. Another large vol danie bloody output from osotomy on 3/21, bleeding source within ostomy site, sutured.    - Also having intermittent vaginal bleeding  - EGD 4/3,4/4 without active bleeding  - Some concern as of 4/9 for recurrent bleeding given acute drop in Hb 4/8 overnight requiring transfusion + thrombocytoepnia + elevated BUN + TEG normal              - Will consider lavage NGT to assess for possible upper GI source given ulcerations noted on EGD (Hb recently stable)              - Also was concern for hemolysis                          - Haptoglobin normal                          - LDH - elevated                           - Check peripheral smear              - CT chest abdomen pelvis w contrast to evaluate for intraabdominal bleed related to surgical complications in the past - not found (see above)  - ?Worsened by impaired marrow response liekly 2/2 persistent inflammation due to low retic index ~1 prior to most recent transfusions; normocytic with normal B12/folate levels; MCV<100. Iatrogenic cause likely contributing 2/2 frequent blood draws; chronic anemia likely persistent inflammatory state/CKD/lymphoma in setting of elevated ferritin of 974. SPEP/UPEP negative.  Plan:  - Routine monitoring ostomy output, if bleeding, apply direct pressure and contact gen surg STAT for hemostasis .  - Continue tube feeds/nutritional support  - Give FFP/vitamin K to correct INR as indicated  - Trend CBC, transfuse Transfuse with leukoreduced and irradiated RBCs to maintain Hgb>7     #Thrombocytopenia  - Likely multifactorial including imparied production from marrow dysfunction in setting of persistent inflammation; RI low suggestive of hyperproliferation, hx of B-cell lymphoma, recent infections including persistent COVID, PNA treated, CMV negative. No known history of vWD/congenital disorders. No liver dysfunction; recent hepatic profile unremarkable. HIT workup negative, Hemolytic workup negative. No s/s DIC. No mechanical valves. ?Primary ITP.  Plan:  - Filgrastim 300mg x3 to aid in plt recovery  - Transfuse with leukoreduced and irradiated PLTs if count <20k due to increased risk of bleeding   - Goal >50k if bleeding; goal >20K if no bleeding  - DVT ppx ok with Plt >50k, start heparin subq 4/11  - See plan under acute on chronic anemia      # Lymphopenia with bandemia  - In setting of high dose steroids, now off  - Severely depressed immunoglobulins inclding IgG, IgA, IgM  - At risk for further infections  - Filgrastim 300mg x3 to aid in plt recovery  - Contact and airborne precautions     #B cell lymphoma  - [facility]  - S/P 6 cycles of chemotherapy in 20222  - Maintained on Rituxan for 2 year course PTA, started May 2022, last dose was 12/2024, treatment currently on hold in setting of persistent COVID-19 infection  Anti-Ritux Ab negative  Plan:  - Holding rituximab in setting of persistent COVID-19 infection, now resolved.  ?resume rituxubmab pending clinical stability & anti-ritux ab status in consultation with heme-onc     DVT ppx: Lovenox     Endo:  #Steroid hyperglycemia and diabetes mellitus type 2, A1c at 6.6 from 01/2024  - Goal -180  - Utilize SSI no insulin gtt     #R thyroid gland enlargement  - Seen on CT 4/5  - Follow up US 4/5- Limited exam due to overlying tracheostomy tube and central line catheter. Heterogeneous lobular appearance to the remaining thyroid gland, nonspecific, question sequela of thyroiditis. No discrete nodule identified.   T4/TSH unremarkable   Plan: no longer planning thyroid FNA biopsy; likely bleed related to tracheostomy insertion  Will need follow up thyroid US sometime week of 4/15 to follow up     ID:  #Fungemia   BCx growing yeast - most recently on 4/8, ID panel- candida albicans  In setting of severe lymphopenia and severely depressed immunoglobulins   Septic emboli noted  Ophtho consulted, no evidence of ophthalmic endophthalmitis   Plan:   Continue fluconazole 800 mg qd (ELIESER resolved on CRRT); will discuss with ID about escalating to other antifungal agents given persistent fungemia on blood cultures  Follow up LP: AFB, fungal culture, bacterial culture, crypto, cytology, ME panel, autoimmune panel pending  SATURNINO per Cardiology postponed  Avoid broad spectrum abx as it can contribute to fungal growth  Continue Zosyn as there is now concern for intraabdominal infection related to ostomy  HD cath out  F/U Repeat serial Bcx (q48h) to ensure clearance    ID following; all Abx will need dose adjustments if renal function worsens     #PCP ppx  Stop Bactrim as steroids  are off after 4/12     #Recurrent bacterial pneumonia  - Patient has completed multiple treatment courses of remdesivir throughout hospitalization in addition to 7 days of ceftriaxone.  - BAL cultures in 2/2024 positive for MDR Pseudomonas and stenotrophomonas treated with 10d course of Levaquin  - CT C/A/P 3/10 with persistent groundglass opacities and changes suggestive of sequelae of COVID, prior infections.  Completed 10d course of cefepime for broad spectrum coverage (completed (3/13)  - Repeat BAL cultures from 3/11 showing 4+ growth Stenotrophomonas maltophilia. Could be colonization given prior BAL growth of same, however due to recent intubation with prolonged corticosteroid theraphy, will begin treating as true pathogen. Patient otherwise remains afebrile, no leukocytosis. CRP with down trending.  Previously on Bactrim from 3/13 to 3/18, discontinued due to worsening ELIESER  Plan:  - S/P 14d Minocycline course to tx stenotrophomonas PNA, completed 3/27   - IV steroids as above  - Minocycline as sputum Cx growing steno (3/31) - off 4/11        MSK/Skin:  #Midline incision wound with poor wound healing-  - General surgery performed staple removal of the patient's midline incision on 3/12 with some skin signs appreciated at the inferior aspect of the wound without obvious pus drainage. Overnight 3/13, wound dehiscence progressed requiring general surgery reevaluation. Red/brown aspirate noted, fascia intact. Wet-to-dry dressings in place. General surgery following for next Epson wound care.  - Poor wound healing in setting of high-dose steroid therapy.  No  exam no obvious signs of infection at this time.   - S/P wound vac replacement 3/13 as per gen surgery. No active concerns for cellulitis.  Plan:  - Wound VAC management as per general surgery  - Wound care for peritracheostomy wound  - Abdominal wound care as per general surgery  - Routine monitoring     #Critical illness myopathy  - Likely exacerbated by  high-dose steroid therapy  Plan:  - Continue to wean steroids as above  - Aggressive PT/OT once clinically stable            Disposition: Critical care    ICU Core Measures     Vented Patient  VAP Bundle  VAP bundle ordered     A: Assess, Prevent, and Manage Pain Has pain been assessed? NA  Need for changes to pain regimen? NA   B: Both Spontaneous Awakening Trials (SATs) and Spontaneous Breathing Trials (SBTs) Plan to perform spontaneous awakening trial today? Yes   Plan to perform spontaneous breathing trial today? Yes   Obvious barriers to extubation? No   C: Choice of Sedation RASS Goal: 0 Alert and Calm  Need for changes to sedation or analgesia regimen? No   D: Delirium CAM-ICU: Unable to perform secondary to Acute cognitive dysfunction   E: Early Mobility  Plan for early mobility? NA   F: Family Engagement Plan for family engagement today? Yes       Antibiotic Review: Patient on appropriate coverage based on culture data.     Review of Invasive Devices:    Diana Plan: Continue for accurate I/O monitoring for 48 hours        Prophylaxis:  VTE VTE covered by:  heparin (porcine), Subcutaneous, 5,000 Units at 04/12/24 0605       Stress Ulcer  covered byomeprazole (PRILOSEC) suspension 2 mg/mL [[id number]]        Significant 24hr Events     24hr events: Yesterday SATURNINO was attempted by Cardiology. Difficulty getting probe down the esophagus. Critical care attempted to assist with using bronchoscope to visualize SATURNINO probe going into esophagus, however access still too difficult. Procedure aborted to avoid excessive blunt trauma to the airway + minimize sedation for the patient. Family meeting held yesterday (see documentation). Otherwise no acute events overnight.      Subjective     Review of Systems   Unable to perform ROS: Mental status change        Objective                            Vitals I/O      Most Recent Min/Max in 24hrs   Temp 98.5 °F (36.9 °C) Temp  Min: 98 °F (36.7 °C)  Max: 99.1 °F (37.3 °C)   Pulse  (!) 114 Pulse  Min: 100  Max: 120   Resp 22 Resp  Min: 16  Max: 27   /74 BP  Min: 86/53  Max: 146/70   O2 Sat 99 % SpO2  Min: 97 %  Max: 99 %      Intake/Output Summary (Last 24 hours) at 2024 0641  Last data filed at 2024 0600  Gross per 24 hour   Intake 735 ml   Output 2225 ml   Net -1490 ml       Diet Enteral/Parenteral; Tube Feeding No Oral Diet; Nepro; Cyclic; 45; 20 hours; Prosource Protein Liquid - One Packet; Refugio Unflavored - One Packet; BID; 100; Water; Every 6 hours    Invasive Monitoring           Physical Exam   Physical Exam  Vitals and nursing note reviewed.   Skin:     General: Skin is warm and dry.   HENT:      Head: Normocephalic and atraumatic.   Neck:      Trachea: Tracheostomy present.   Cardiovascular:      Rate and Rhythm: Regular rhythm. Tachycardia present.      Heart sounds: S1 normal and S2 normal. No murmur heard.  Musculoskeletal:      Cervical back: Neck supple.      Right lower le+ Pitting Edema present.      Left lower le+ Pitting Edema present.   Abdominal: General: Bowel sounds are normal.      Palpations: Abdomen is soft.   Constitutional:       General: She is awake. She is in acute distress.      Appearance: She is ill-appearing.      Interventions: She is intubated.   Pulmonary:      Effort: She is intubated.      Breath sounds: Normal breath sounds.   Neurological:      Mental Status: She is unresponsive.      Comments: Unresponsive, does usually open eyes to verbal stimulation            Diagnostic Studies      EKG:   Imaging:  I have personally reviewed pertinent reports.       Medications:  Scheduled PRN   chlorhexidine, 15 mL, Q12H SARTHAK  fentaNYL, 100 mcg, Once  fluconazole, 800 mg, Q24H  heparin (porcine), 5,000 Units, Q8H SARTHAK  insulin lispro, 1-6 Units, Q6H SARTHAK  lacosamide, 100 mg, Q12H SARTHAK  nystatin, , BID  omeprazole (PRILOSEC) suspension 2 mg/mL, 20 mg, Daily  piperacillin-tazobactam, 4.5 g, Q8H  polyethylene glycol, 17 g, Daily  sodium  chloride, 2 spray, BID  sodium chloride, 4 mL, TID  sulfamethoxazole-trimethoprim, 1 tablet, Once per day on Monday Wednesday Friday      acetaminophen, 650 mg, Q6H PRN  fentaNYL, 50 mcg, Q1H PRN  ondansetron, 4 mg, Q6H PRN  sodium chloride, 1 Application, Q1H PRN       Continuous    dexmedetomidine, 0.1-0.7 mcg/kg/hr, Last Rate: Stopped (04/11/24 1224)  norepinephrine, 1-30 mcg/min, Last Rate: 1 mcg/min (04/11/24 1242)         Labs:    CBC    Recent Labs     04/11/24  0502 04/11/24  1701 04/12/24  0521   WBC 15.18* 16.23* 15.82*   HGB 8.1* 7.6* 8.8*   HCT 24.2* 23.1* 27.3*   PLT 58* 56* 58*   BANDSPCT 12* 22*  --      BMP    Recent Labs     04/11/24  0502   SODIUM 144   K 3.7   *   CO2 19*   AGAP 14*   BUN 65*   CREATININE 1.19   CALCIUM 9.9       Coags    No recent results     Additional Electrolytes  Recent Labs     04/10/24  0859 04/11/24  0502   MG  --  2.0   PHOS  --  5.6*   CAIONIZED 1.45*  --           Blood Gas    No recent results  No recent results LFTs  Recent Labs     04/11/24  0502   ALT 38   AST 21   ALKPHOS 786*   ALB 2.1*   TBILI 1.45*       Infectious  No recent results  Glucose  Recent Labs     04/11/24  0502   GLUC 132               Livan Morfin DO

## 2024-04-12 NOTE — QUICK NOTE
4/12/2024 9:46 AM -  Carla Torresisak's chart and case were reviewed by Hannah Ramirez PA-C.  Mode of review included electronic chart check, and communication with primary team.      Per review, no overnight events. Continue multi-disciplinary medical optimization.      Palliative care will return on 4/15 for ongoing support.         For urgent issues or any questions/concerns, please notify on-call provider via DigitalMR.  You may also call our answering service 24/7 at [phone].    Hannah Ramirez PA-C  Palliative and Supportive Care  Clinic/Answering Service: [phone]  You can find me on DigitalMR!

## 2024-04-12 NOTE — PROGRESS NOTES
Progress Note -Interventional Radiology  Carla Hunt 59 y.o. female MRN: [id number]  [facility]: [id number]    Assessment/Plan:    59-year-old female with a history of B-cell lymphoma with with bony metastasis initially presenting to the hospital 1/18/2024 with COVID-19 infection.  During her  prolonged hospital course findings of pelvic fluid collection on CT of the abdomen pelvis was noted on 4/9/2024.    On 4/10/24 patient presented to interventional radiology for lumbar puncture and placement of pelvic drain.     Reviewed laboratory findings  Status post LP 4/10/24  Status post IR right transgluteal 10 F drain placement 4/10/2024  NICOLASA drain to bulb suction with a small amount of tan milky drainage  CSF culture with no growth  Culture, body fluid from transgluteal drain positive for yeast  Continue to flush transgluteal drain every 8 hours with 5 mL normal saline  Continue to document/record tube output and characteristics  Remainder of care per critical care team  Please not hesitate to contact interventional radiology for concerns/questions.    Medical Problems       Problem List       * (Principal) Acute respiratory failure with hypoxia (HCC)    Rosacea    Anxiety state    Disorder of parathyroid gland (HCC)    Eczema    Grand mal status (HCC)    Hypercalcemia    Hypertensive disorder    Long term current use of hormonal contraceptive    Overview Signed 11/5/2018  4:05 PM by Lizbeth Depadua, MD     Last Assessment & Plan:   Admits to being on Depo x9yrs but has never had a Bone Density scan done- req given- agrees to do. Would like to discontinue and have a Tubal Ligation- aware may already be in Menopause however does not want to take any chance of becoming pregnant and would like permanent sterilization done before discontinuing the Depo.         Open wound of hand except fingers    Otitis externa    Overweight    Pain in face    Skin sensation disturbance    Subjective visual  "disturbance    Hidradenitis suppurativa of left axilla    Other specified anxiety disorders    Reactive depression    Encephalopathy    Nephrolithiasis    COVID    Stage 3b chronic kidney disease (CKD) (HCC)    Lab Results   Component Value Date    EGFR 47 04/12/2024    EGFR 50 04/11/2024    EGFR 53 04/10/2024    CREATININE 1.26 04/12/2024    CREATININE 1.19 04/11/2024    CREATININE 1.13 04/10/2024         Abnormal CT of the head    Transaminitis    Teto marginal zone B-cell lymphoma (HCC)    Seizure disorder (HCC)    Hypotension    Palliative care patient    Goals of care, counseling/discussion    Acute kidney injury (HCC)    Cecal volvulus (HCC)    Drowsiness    Urinary retention    Fungemia    Pancytopenia (HCC)    Gross hematuria    Cerebrovascular accident (CVA) due to embolism (HCC)             Subjective: 59-year-old female with a history of B-cell lymphoma with with bony metastasis initially presenting to the hospital 1/18/2024 with COVID-19 infection.  During her  prolonged hospital course findings of pelvic fluid collection on CT of the abdomen pelvis was noted on 4/9/2024.    On 4/10/24 patient presented to interventional radiology for lumbar puncture and placement of pelvic drain.     On evaluation this morning, patient resting comfortably in bed with family at bedside.  She is awake.  Right transgluteal NICOLASA drain to bulb suction with a small amount of milky tan drainage.    Objective:    Vitals:  /65   Pulse (!) 116   Temp 98.5 °F (36.9 °C) (Axillary)   Resp 20   Ht 5' 8\" (1.727 m)   Wt 87 kg (191 lb 12.8 oz)   SpO2 97%   BMI 29.16 kg/m²   Body mass index is 29.16 kg/m².  Weight (last 2 days)       Date/Time Weight    04/12/24 0559 87 (191.8)    04/11/24 0558 84 (185.19)    04/10/24 0555 86.2 (190.04)            I/Os:    Intake/Output Summary (Last 24 hours) at 4/12/2024 0952  Last data filed at 4/12/2024 0642  Gross per 24 hour   Intake 775 ml   Output 2855 ml   Net -2080 ml       Invasive " Devices       Peripheral Intravenous Line  Duration             Peripheral IV 04/08/24 Proximal;Right;Ventral (anterior) Forearm 3 days    Peripheral IV 04/09/24 Left;Proximal;Ventral (anterior) Forearm 3 days              Drain  Duration             Ileostomy RUQ 51 days    Urethral Catheter Three way 18 Fr. 11 days    NG/OG/Enteral Tube Nasogastric 18 Fr Right nare 8 days    Abscess Drain Buttock 1 day              Airway  Duration             Surgical Airway Shiley Cuffed 30 days                    Physical Exam  Vitals reviewed.   Cardiovascular:      Rate and Rhythm: Normal rate and regular rhythm.      Pulses: Normal pulses.   Pulmonary:      Effort: Pulmonary effort is normal.      Breath sounds: Rhonchi present.      Comments: Trach in place  Abdominal:      General: Abdomen is flat.      Palpations: Abdomen is soft.      Comments: Ostomy in place. Stoma pink and moist   Skin:     General: Skin is warm.      Comments: RIGHT transgluteal NICOLASA drain with a small amount of milky tan drainage   Neurological:      Mental Status: Mental status is at baseline.       Lab Results and Cultures:   CBC with diff:   Lab Results   Component Value Date    WBC 15.82 (H) 04/12/2024    HGB 8.8 (L) 04/12/2024    HCT 27.3 (L) 04/12/2024    MCV 90 04/12/2024    PLT 58 (L) 04/12/2024    ADJUSTEDWBC 9.71 03/12/2024    RBC 3.02 (L) 04/12/2024    MCH 29.1 04/12/2024    MCHC 32.2 04/12/2024    RDW 20.5 (H) 04/12/2024    MPV 12.4 04/11/2024    NRBC 6 (H) 04/12/2024      BMP/CMP:  Lab Results   Component Value Date     11/13/2017    K 3.1 (L) 04/12/2024    K 4.2 12/19/2023     (H) 04/12/2024     (H) 12/19/2023    CO2 18 (L) 04/12/2024    CO2 15 (L) 03/20/2024    CO2 21 12/19/2023    BUN 65 (H) 04/12/2024    BUN 19 12/19/2023    CREATININE 1.26 04/12/2024    CREATININE 1.36 (H) 12/19/2023    GLUCOSE 118 03/20/2024    GLUCOSE 139 (H) 08/17/2017    CALCIUM 10.2 04/12/2024    CALCIUM 10.2 (H) 12/19/2023    AST 22 04/12/2024  "   AST 13 12/19/2023    ALT 39 04/12/2024    ALT 15 12/19/2023    ALKPHOS 845 (H) 04/12/2024    ALKPHOS 175 (H) 12/19/2023    PROT 7.7 08/17/2017    BILITOT 0.2 08/17/2017    EGFR 47 04/12/2024    EGFR 45 (L) 12/19/2023    EGFR 55 (L) 08/27/2020   ,     Coags:   Lab Results   Component Value Date    PT 14.3 07/15/2021    PTT 40 (H) 04/04/2024    INR 1.40 (H) 04/09/2024    INR 1.2 07/15/2021   ,   Results from last 7 days   Lab Units 04/09/24  0446 04/08/24  0505 04/07/24  0508 04/06/24  0813 04/05/24  1158   INR  1.40* 1.34* 1.40* 1.45* 1.63*        Lipid Panel: No results found for: \"CHOL\"  Lab Results   Component Value Date    HDL 40 (L) 01/09/2024     Lab Results   Component Value Date    HDL 40 (L) 01/09/2024     Lab Results   Component Value Date    LDLCALC 59 01/09/2024     Lab Results   Component Value Date    TRIG 331 (H) 03/13/2024       HgbA1c:   Lab Results   Component Value Date    HGBA1C 6.6 (H) 01/09/2024    HGBA1C 5.7 (H) 04/18/2023       Blood Culture:   Lab Results   Component Value Date    BLOODCX Candida albicans (A) 04/09/2024   ,   Urinalysis:   Lab Results   Component Value Date    COLORU Yellow 04/06/2024    COLORU Yellow 11/13/2017    CLARITYU Extra Turbid 04/06/2024    SPECGRAV 1.024 04/06/2024    SPECGRAV 1.014 11/13/2017    PHUR 6.0 04/06/2024    PHUR 6.5 11/13/2017    LEUKOCYTESUR Large (A) 04/06/2024    LEUKOCYTESUR 3+ (A) 11/13/2017    NITRITE Negative 04/06/2024    NITRITE Positive (A) 11/13/2017    PROTEINUA Negative 11/13/2017    GLUCOSEU Negative 04/06/2024    KETONESU Trace (A) 04/06/2024    KETONESU Negative 11/13/2017    BILIRUBINUR Negative 04/06/2024    BILIRUBINUR Negative 11/13/2017    BLOODU Large (A) 04/06/2024   ,   Urine Culture:   Lab Results   Component Value Date    URINECX >100,000 cfu/ml Candida albicans (A) 04/06/2024   ,   Wound Culure:  No results found for: \"WOUNDCULT\"    Thank you for allowing me to participate in the care of Carla Hunt.     This text is " generated with voice recognition software. There may be translation, syntax, or grammatical errors. If you have any questions, please contact the dictating provider.    DANYELLE Duarte

## 2024-04-12 NOTE — RESPIRATORY THERAPY NOTE
RT Ventilator Management Note  Carla Hunt 59 y.o. female MRN: [id number]  [facility] [id number]      Daily Screen         4/11/2024 1944 4/12/2024  0747          Patient safety screen outcome:: Failed Passed      Not Ready for Weaning due to:: Underline problem not resolved --                Physical Exam:   Assessment Type: Assess only  General Appearance: Sleeping  Respiratory Pattern: Assisted  Chest Assessment: Chest expansion symmetrical  Bilateral Breath Sounds: Coarse  Cough: None  Suction: Trach  O2 Device: G5      Resp Comments: Placed pt on SPONT, PS 10 PEEP 6 fio2 40%. pt tolerating these settings well at this time. Will continue to monitor pt per resp  protocol.

## 2024-04-12 NOTE — CASE MANAGEMENT
Case Management Discharge Planning Note    Patient name Carla Hunt  Location MICU 10/MICU 10 MRN [id number]  : 1965 Date 2024       Current Admission Date: 1/10/2024  Current Admission Diagnosis:Acute respiratory failure with hypoxia ([facility])   Patient Active Problem List    Diagnosis Date Noted    Cerebrovascular accident (CVA) due to embolism ([facility]) 2024    Fungemia 2024    Pancytopenia ([facility]) 2024    Gross hematuria 2024    Drowsiness 2024    Urinary retention 2024    Acute kidney injury ([facility]) 2024    Cecal volvulus ([facility]) 2024    Palliative care patient 2024    Goals of care, counseling/discussion 2024    Hypotension 2024    Seizure disorder ([facility]) 2024    Acute respiratory failure with hypoxia ([facility]) 2024    Transaminitis 2024    Teto marginal zone B-cell lymphoma ([facility]) 2024    COVID 2024    Stage 3b chronic kidney disease (CKD) ([facility]) 2024    Abnormal CT of the head 2024    Nephrolithiasis 2021    Encephalopathy 2020    Other specified anxiety disorders 2019    Reactive depression 2019    Hidradenitis suppurativa of left axilla 2019    Anxiety state 2018    Disorder of parathyroid gland ([facility]) 2018    Eczema 2018    Grand mal status ([facility]) 2018    Hypercalcemia 2018    Hypertensive disorder 2018    Open wound of hand except fingers 2018    Otitis externa 2018    Overweight 2018    Pain in face 2018    Skin sensation disturbance 2018    Subjective visual disturbance 2018    Long term current use of hormonal contraceptive 10/23/2018    Rosacea 2015      LOS (days): 93  Geometric Mean LOS (GMLOS) (days):   Days to GMLOS:     OBJECTIVE:  Risk of Unplanned Readmission Score: 27.29         Current admission status: Inpatient   Preferred Pharmacy:   [id number] [place] [facility] [address]  [address] [place] [address].  [place] [address]  Phone: [phone] Fax: [phone]    [facility] - [place], [place] - [facility] [address]  Phone: [phone] Fax: [phone]    Primary Care Provider: Tony Guevara MD    Primary Insurance: [employer]  Secondary Insurance: MEDICARE    DISCHARGE DETAILS:          Additional Comments: Patient continues to need ICU level of care with trach and vent support.  GOC continues to be discussed with family.  CM to be available

## 2024-04-12 NOTE — PROGRESS NOTES
Progress Note - Infectious Disease   Carla Hunt 59 y.o. female MRN: [id number]  Unit/Bed#: [facility] 10 Encounter: [id number]      Impression/Plan:    1. Sepsis, recurrent since admission.  Due to COVID-19 infection, recurrent pneumonias, cecal volvulus status post surgery and wound dehiscence, now with persistent candidemia, fecal contamination of midline wound, multiple embolic strokes, possible intra-abdominal abscess versus hemoperitoneum.  Status post IR drain placement to pelvic collection 4/10 which yielded pus.  Culture shows heavy growth of Candida albicans so likely a source of persistent fungemia.  Also status post LP 4/10 which was not concerning for CNS infection.  CTA head without evidence of mycotic aneurysm   -Continue high-dose fluconazole   -Continue IV Zosyn, continuous infusion dosing by pharmacy  -Follow temperatures closely  -Recheck CBC diff in AM to monitor infection  -Supportive care as per the primary service  -Follow-up pending cultures, drain culture    2. Acute hypoxic respiratory failure, likely multifactorial, with both COVID and bacterial pneumonia contributing.  Also consider persistent inflammation, fibrosis as seems quite steroid responsive in past.  Most recently extubated 3/1.  Patient with worsening respiratory status requiring intubation again 3/11.  Suspect ongoing hypoxia related to persistent COVID-pneumonia, superimposed bacterial infection, inflammatory component/lung fibrosis related to COVID, now with profound deconditioning and muscle weakness.  Status post bronchoscopy and trach 3/12 with excessive secretions seen from the lower lobes bilaterally.  BAL culture from 3/11 shows heavy growth of Stenotrophomonas maltophilia, Candida albicans.  PJP DFA negative.  Fungitell was positive but patient was on beta-lactam antibiotics and had received multiple blood transfusions.  She was clinically improving and monitor off antifungals. Serum cryptococcal Ag negative. Status  post 10 days of vancomycin and cefepime, 14-day course of remdesivir/Paxlovid 3/15, 14-day course of antibiotics with minocycline for stenotrophomonas pneumonia. Status post repeat bronchoscopy 3/17 for airway clearance with continued thick secretions.   Histoplasma urine antigen negative.  Patient was clinically improving and weaned to trach collar but 3/30 had worsening lethargy, again placed on the ventilator 3/31. Antibiotics restarted.  Sputum culture is now growing stenotrophomonas maltophilia and mixed respiratory lauren. Repeat galactomannan negative on 4/2. Repeat CT stable. Was on high dose steroids nearly 3 months, now weaned off.  No active concern for pneumonia at this time.  Patient weaned on pressure support today   -No active pulmonary infection, no antimicrobials for this indication at this time    3.  Persistent candidemia.  2 sets of blood cultures collected 3/31 are growing Candida albicans, susceptible to fluconazole.  The patient has numerous risk factors for candidemia including presence of midline, abdominal surgery, prolonged hospitalization in the ICU, multiple courses of broad-spectrum antibiotics, leukopenia/recent neutropenia.  Suspect ileostomy retraction with wound contamination is the initial source.  TTE x 2 no vegetations.  Status post ophthalmology evaluation, no evidence of endophthalmitis.  MRI brain now shows bilateral infarcts, consideration for possible septic emboli.  Unfortunately patient with persistent fungemia 3/10/2024.  Persistent fungemia raising concern for endovascular source of infection versus lack of source control.  Concerned this is related to intra-abdominal process as new pelvic abscess was drained 4/10 and culture shows heavy growth of Candida albicans.  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  Also consideration for endocarditis versus a perivalvular abscess; SATURNINO unable to be performed as could not pass through the esophagus.   CTA without evidence of mycotic aneurysm   -Continue IV fluconazole 800mg daily    -Follow-up blood cultures collected today which are the first set of blood cultures from drain placement.    - Requested that micro lab send out for repeat susceptibility testing   -Recommend repeat TTE next week to assess for valvular changes since SATURNINO cannot be performed   -Repeat blood cultures 4/14   -If repeat blood cultures continue to be positive, recommend repeat CT A/P to assess for source control/additional collections and will consider dual antifungal therapy although there is limited data for benefit of this    4.  Bilateral acute infarcts on MRI brain.  Suggesting embolic etiology.  Consideration for endocarditis in setting of fungemia.  May also be related to paradoxical embolism from right lower extremity DVT and PFO.  Per neurology, low concern this is causing her persistent encephalopathy.  Repeat MRI shows new infarcts in the left inferior parietal lobe with small area of acute hemorrhage noted.  IVC filter placed 4/9.  CTA without mycotic aneurysm   -Neurology following    5. Recurrent bacterial pneumonia.  The patient has completed multiple treatment courses over the hospitalization. Prior BAL cultures with Pseudomonas and stenotrophomonas.  Unclear if pathogens or colonizers.  Status post 10 days levofloxacin.  CT C/A/P showed evolving changes likely all due to sequelae of COVID, infections.  Noted to have worsening hypoxia and purulent secretions on bronchoscopy 3/11.  BAL culture with heavy growth of Stenotrophomonas maltophilia, Candida.  Completed 14-day course of minocycline 3/27.  CT chest now shows new right upper lobe infiltrate and sputum culture is again growing Stenotrophomonas maltophilia.  Completed another 10-day course of minocycline.  Repeat CT chest 4/5 with improving but persistent groundglass and consolidative opacities.              -Antibiotics as above              -Wean O2 as able     6.  Severe COVID, present on admission.  Patient was treated with a 10-day course of remdesivir, dexamethasone and was given 1 dose of Tocilizumab on admission.  COVID antigen was negative prior to coming off isolation.  Had positive COVID PCR from BAL 2/16, status post another 5-day course of remdesivir.  Patient has remained on high-dose systemic corticosteroid throughout her hospitalization.  COVID antigen positive and PCR is also positive 3/3 and Ct 26.8, consistent with high viral replication.  Repeat PCR positive with Ct closer to 30. Status post 14-day course of remdesivir/Paxlovid 3/15/2024.  Rapid COVID antigen negative 3/25 and 3/27  -Steroid wean per ICU  -No additional antivirals indicated  -Discontinue Bactrim for PJP prophylaxis since she is off of steroids     7. 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  Patient is status post exploratory laparotomy with ileocecectomy and end ileostomy creation 2/20.   End ileostomy is with ongoing ischemic changes which is likely contributing to the imaging findings and ongoing SIRS response.  Now with wound dehiscence status post staple removal, status post wound VAC placement 3/13, removed but replaced due to bleeding.  Now with retraction of ileostomy and fecal contamination through midline wound.  Status post washout 4/4.  Repeat CT now shows small volume hemoperitoneum in the pelvis, small anterior abdominal wall hematoma.  Status post IR drain placement with drainage of pus.  Culture shows heavy growth of Candida albicans  -Surgery follow-up   -Continue Zosyn     8. B-cell lymphoma, on maintenance rituxan, which is currently postponed.  Rituximab is a risk factor for persistent COVID infection.  There is now a hypovascular mass of the thyroid gland enlarging on serial imaging, concern for lymphoma.  IR consulted for biopsy.    9.  ELIESER.  Likely multifactorial due to prerenal status, medications.  With worsening BUN and creatinine.  Concern GI bleed  make a contributing to high BUN.  Would also consider if this is attributing to worsening mental status.  Status post CRRT, now off with improvement in renal function. HD catheter removed due to persistent fungemia   -Monitor creatinine closely   -Nephrology following    10.  Anemia, thrombocytopenia, lymphopenia/neutropenia.  Patient has had persistent lymphopenia throughout this admission, likely due to rituximab, viral infection, high-dose steroids.  Now with worsening anemia, neutropenia, and thrombocytopenia.  Bactrim discontinued 3/18. CMV PCR negative.  Not a likely side effect of minocycline.  Immunoglobulins are low.  Started on Granix 3/27, white blood cell count now improved.  May be seen in IRIS type response with multiple underlying infections. Current bandemia likely multifactorial from recent G-CSF, weaning steroids, multiple infections   -Transfusion support per primary   -Hematology follow-up   -Monitor CBC    11.  GI bleed, ulcerations.  Status post EGD yesterday which showed erosive gastritis, esophageal tear with oozing, blood and extensive clot in the esophagus and stomach, small ulcers and trauma throughout the stomach.  Status post repeat EGD 4/4.  GMS, CMV/HSV stains negative on pathology.   -Continue PPI    12. Persistent encephalopathy. Worsened 3/30. MRI brain shows multiple small bilateral foci of acute infarcts as described suggesting embolic etiology, partial lack of sulcal CSF suppression in the FLAIR sequence noted in the supratentorial and infratentorial brain. This could be artifactual/technique related; however, inflammatory or infectious process is not excluded. Patient opens her eyes and tracks.  Status post lumbar puncture 4/10, CSF studies not consistent with meningitis.   -Follow-up CTA   -neurology following   -Management of infections as above    13.  Elevated alk phos/T. bili.  Worsening over the past few days.  May be related to fluconazole although would expect more  elevation in AST/ALT.  Right upper quadrant ultrasound no significant abnormalities.    Above management plan to continue antibiotics/antifungals, follow-up pending blood and drain cultures with the critical care team. ID will monitor over the weekend and formally reevaluate 4/15/24.  Please call with questions.    Antibiotics:  Fluconazole  Zosyn    Subjective:  The patient is afebrile, white blood cell count stable.  She is opening her eyes more today.  Patient on pressure support this morning.  No other clinical changes.    Objective:  Vitals:  Temp:  [98 °F (36.7 °C)-98.8 °F (37.1 °C)] 98.4 °F (36.9 °C)  HR:  [110-120] 116  Resp:  [18-25] 22  BP: (116-146)/(64-75) 116/65  SpO2:  [97 %-99 %] 99 %  Temp (24hrs), Av.4 °F (36.9 °C), Min:98 °F (36.7 °C), Max:98.8 °F (37.1 °C)  Current: Temperature: 98.4 °F (36.9 °C)    Physical Exam:   General Appearance:  Ill-appearing, lethargic   Neck: Trach in place.   Lungs:   Minimal rhonchi bilaterally   Heart:  RRR; no murmur, rub or gallop   Abdomen:   Midline wound with dressing in place. Gluteal drainage catheter with purulent appearing fluid   Extremities: No edema   Skin: No new rashes or lesions.        Labs:   All pertinent labs and imaging studies were personally reviewed  Results from last 7 days   Lab Units 24  0521 24  1701 24  0502   WBC Thousand/uL 15.82* 16.23* 15.18*   HEMOGLOBIN g/dL 8.8* 7.6* 8.1*   PLATELETS Thousands/uL 58* 56* 58*     Results from last 7 days   Lab Units 24  0521 24  0502 04/10/24  0543   SODIUM mmol/L 147 144 141   POTASSIUM mmol/L 3.1* 3.7 3.7   CHLORIDE mmol/L 111* 111* 107   CO2 mmol/L 18* 19* 20*   BUN mg/dL 65* 65* 62*   CREATININE mg/dL 1.26 1.19 1.13   EGFR ml/min/1.73sq m 47 50 53   CALCIUM mg/dL 10.2 9.9 10.2   AST U/L 22 21 24   ALT U/L 39 38 42   ALK PHOS U/L 845* 786* 918*             Results from last 7 days   Lab Units 24  0508   CRP mg/L 165.6*       Imaging:  CT C/A/P-Small volume  hemoperitoneum in the cul-de-sac, and small anterior abdominal wall hematoma. No large hematoma in the chest abdomen or pelvis

## 2024-04-12 NOTE — PLAN OF CARE
Problem: SAFETY ADULT  Goal: Patient will remain free of falls  Description: INTERVENTIONS:  - Educate patient/family on patient safety including physical limitations  - Instruct patient to call for assistance with activity   - Consult OT/PT to assist with strengthening/mobility   - Keep Call bell within reach  - Keep bed low and locked with side rails adjusted as appropriate  - Keep care items and personal belongings within reach  - Initiate and maintain comfort rounds  - Make Fall Risk Sign visible to staff  - Offer Toileting every 2 Hours, in advance of need  - Initiate/Maintain bed alarm  - Obtain necessary fall risk management equipment: non skid footwear  - Apply yellow socks and bracelet for high fall risk patients  - Consider moving patient to room near nurses station  Outcome: Progressing     Problem: RESPIRATORY - ADULT  Goal: Achieves optimal ventilation and oxygenation  Description: INTERVENTIONS:  - Assess for changes in respiratory status  - Assess for changes in mentation and behavior  - Position to facilitate oxygenation and minimize respiratory effort  - Oxygen administered by appropriate delivery if ordered  - Initiate smoking cessation education as indicated  - Encourage broncho-pulmonary hygiene including cough, deep breathe, Incentive Spirometry  - Assess the need for suctioning and aspirate as needed  - Assess and instruct to report SOB or any respiratory difficulty  - Respiratory Therapy support as indicated  Outcome: Progressing     Problem: SKIN/TISSUE INTEGRITY - ADULT  Goal: Skin Integrity remains intact(Skin Breakdown Prevention)  Description: Assess:  -Perform Flavio assessment every shift   -Clean and moisturize skin every day   -Inspect skin when repositioning, toileting, and assisting with ADLS  -Assess under medical devices such as ronny  every hour   -Assess extremities for adequate circulation and sensation     Bed Management:  -Have minimal linens on bed & keep smooth,  unwrinkled  -Change linens as needed when moist or perspiring  -Avoid sitting or lying in one position for more than 2 hours while in bed  -Keep HOB at 45 degrees     Toileting:  -Offer bedside commode  -Assess for incontinence every hour  -Use incontinent care products after each incontinent episode such as moisture barrier cream     Activity:  -Mobilize patient 3 times a day  -Encourage activity and walks on unit  -Encourage or provide ROM exercises   -Turn and reposition patient every 2 Hours  -Use appropriate equipment to lift or move patient in bed  -Instruct/ Assist with weight shifting every hour when out of bed in chair  -Consider limitation of chair time 2 hour intervals    Skin Care:  -Avoid use of baby powder, tape, friction and shearing, hot water or constrictive clothing  -Relieve pressure over bony prominences using allevyn   -Do not massage red bony areas    Next Steps:  -Teach patient strategies to minimize risks such as weight shifting    -Consider consults to  interdisciplinary teams such as PT  Outcome: Progressing  Goal: Incision(s), wounds(s) or drain site(s) healing without S/S of infection  Description: INTERVENTIONS  - Assess and document dressing, incision, wound bed, drain sites and surrounding tissue  - Provide patient and family education  Outcome: Progressing  Goal: Pressure injury heals and does not worsen  Description: Interventions:  - Implement low air loss mattress or specialty surface (Criteria met)  - Apply silicone foam dressing  - Consider nutrition services referral as needed  Outcome: Progressing     Problem: Potential for Falls  Goal: Patient will remain free of falls  Description: INTERVENTIONS:  - Educate patient/family on patient safety including physical limitations  - Instruct patient to call for assistance with activity   - Consult OT/PT to assist with strengthening/mobility   - Keep Call bell within reach  - Keep bed low and locked with side rails adjusted as  appropriate  - Keep care items and personal belongings within reach  - Initiate and maintain comfort rounds  - Make Fall Risk Sign visible to staff  - Offer Toileting every 2 Hours, in advance of need  - Initiate/Maintain bed alarm  - Obtain necessary fall risk management equipment: non skid footwear  - Apply yellow socks and bracelet for high fall risk patients  - Consider moving patient to room near nurses station  Outcome: Progressing     Problem: Nutrition/Hydration-ADULT  Goal: Nutrient/Hydration intake appropriate for improving, restoring or maintaining nutritional needs  Description: Monitor and assess patient's nutrition/hydration status for malnutrition. Collaborate with interdisciplinary team and initiate plan and interventions as ordered.  Monitor patient's weight and dietary intake as ordered or per policy. Utilize nutrition screening tool and intervene as necessary. Determine patient's food preferences and provide high-protein, high-caloric foods as appropriate.     INTERVENTIONS:  - Monitor oral intake, urinary output, labs, and treatment plans  - Assess nutrition and hydration status and recommend course of action  - Evaluate amount of meals eaten  - Assist patient with eating if necessary   - Allow adequate time for meals  - Recommend/ encourage appropriate diets, oral nutritional supplements, and vitamin/mineral supplements  - Order, calculate, and assess calorie counts as needed  - Recommend, monitor, and adjust tube feedings and TPN/PPN based on assessed needs  - Assess need for intravenous fluids  - Provide specific nutrition/hydration education as appropriate  - Include patient/family/caregiver in decisions related to nutrition  Outcome: Progressing

## 2024-04-12 NOTE — PROGRESS NOTES
Pastoral Care Progress Note    2024  Patient: Carla Hunt : 1965  Admission Date & Time: 1/10/2024 1941  MRN: [id number] [facility]: [id number]                     Chaplaincy Interventions Utilized:   Empowerment: Encouraged assertiveness, Encouraged focus on present, Encouraged self-care, Normalized experience of patient/family, Provided anticipatory guidance, Provided anxiety containment, and Provided grief counseling    Exploration: Explored hope, Explored emotional needs & resources, Explored relational needs & resources, Explored spiritual needs & resources, and Facilitated story telling    Collaboration: Advocated for patient/family and Consulted with interdisciplinary team    Relationship Building: Cultivated a relationship of care and support, Listened empathically, Hospitality, and Provided silent and supportive presence      Chaplaincy Outcomes Achieved:  Catharsis, Distress reduced, Expressed gratitude, Tearfully processed emotions, and Verbally processed emotions      Spiritual Coping Strategies Utilized:   Connectedness       24 2200   Clinical Encounter Type   Visited With Family;Health care provider   Crisis Visit Critical Care     This  provided follow up emotional support and spiritual support to pt's , Regan. Regan is quite bereft by pt's condition and expressed desire to have a team meeting to discuss pt's plan of care and goals of care. This  communicated this desire to pt's nurse and to palliative team members. Regan and dtr, Cathryn, are quite overwhelmed w stress and this  continues to provide grief support, safe space for emotional processing, and coping resources. Spiritual Care remains available.

## 2024-04-12 NOTE — QUICK NOTE
SATURNINO was attempted yesterday however probe was unable to be passed even with the assistance of direct visualization with bronchoscope.  There was a discussion with GI regarding assistance with probe placement however given the significant risks this is not possible at this time.  Given significant risks to patient with SATURNINO will defer at this time.  Cardiology will sign off, please reach out if there are any further questions or concerns.

## 2024-04-12 NOTE — RESPIRATORY THERAPY NOTE
04/12/24 0321   Respiratory Assessment   Assessment Type Assess only   General Appearance Sleeping   Respiratory Pattern Assisted   Chest Assessment Chest expansion symmetrical   Bilateral Breath Sounds Coarse   Cough None   Suction Trach   Resp Comments Pt. remians on CMV settings, tolerating well.   O2 Device G5   Vent Information   Vent ID [id number]   Vent type Summers G5   Summers Vent Mode (S)CMV   $ Vent Daily Charge-Subsequent Yes   $ Pulse Oximetry Spot Check Charge Completed   (S)CMV Settings   Resp Rate (BPM) 16 BPM   VT (mL) 400 mL   FIO2 (%) 40 %   PEEP (cmH2O) 6 cmH2O   I:E Ratio 1/3.2   Insp Time (%) 0.8 %   Flow Trigger (LPM) 5   Humidification Heater   Heater Temperature (Set) 95 °F (35 °C)   (S)CMV Actuals   Resp Rate (BPM) 19 BPM   VT (mL) 412   MV 7.4   MAP (cmH2O) 8.9 cmH2O   Peak Pressure (cmH2O) 34 cmH2O   I:E Ratio (Obs) 1/3.0   Insp Resistance 11   Heater Temperature (Obs) 95 °F (35 °C)   Static Compliance (mL/cmH20) 158 mL/cmH2O   Plateau Pressure (cm H2O) 18.5 cm H2O   (S)CMV Alarms   High Peak Pressure (cmH2O) 40   Low Pressure (cmH2O) 5 cm H2O   High Resp Rate (BPM) 40 BPM   Low Resp Rate (BPM) 8 BPM   High MV (L/min) 20 L/min   Low MV (L/min) 4 L/min   High VT (mL) 1000 mL   Low VT (mL) 250 mL   Apnea Time (s) 20 S   Maintenance   Alarm (pink) cable attached No   Resuscitation bag with peep valve at bedside Yes   Water bag changed No   Circuit changed No   Surgical Airway Shiley Cuffed   Placement Date/Time: 03/12/24 1200   Tube Size: 8 mm  Type: Tracheostomy  Brand: Naveen  Style: Cuffed   Status Cuff Inflated   Site Assessment Clean;Dry   Ties Assessment Clean;Dry;Intact   Surgical Airway Cuff Pressure (cm H20)   (MLT)   Surgical Airway Cuff Pressure (color) Green   Equipment at bedside BVM;Wall Suction setup;Additional complete same size trach tube;Additional complete one size smaller trach tube;Obturator;Sterile saline;Additional inner cannula     RT Ventilator Management  Note  Carla Hunt 59 y.o. female MRN: [id number]  Unit/Bed#: [facility] 10 Encounter: [id number]      Daily Screen         4/11/2024  0804 4/11/2024 1944          Patient safety screen outcome:: Failed Failed      Not Ready for Weaning due to:: Underline problem not resolved Underline problem not resolved                Physical Exam:   Assessment Type: (P) Assess only  General Appearance: (P) Sleeping  Respiratory Pattern: (P) Assisted  Chest Assessment: (P) Chest expansion symmetrical  Bilateral Breath Sounds: (P) Coarse  Cough: (P) None  Suction: (P) Trach  O2 Device: (P) G5      Resp Comments: (P) Pt. remians on CMV settings, tolerating well.

## 2024-04-13 LAB
ABO GROUP BLD BPU: NORMAL
ABO GROUP BLD: NORMAL
ALBUMIN SERPL BCP-MCNC: 2 G/DL (ref 3.5–5)
ALP SERPL-CCNC: 743 U/L (ref 34–104)
ALT SERPL W P-5'-P-CCNC: 31 U/L (ref 7–52)
ANION GAP SERPL CALCULATED.3IONS-SCNC: 15 MMOL/L (ref 4–13)
ANISOCYTOSIS BLD QL SMEAR: PRESENT
AST SERPL W P-5'-P-CCNC: 16 U/L (ref 13–39)
BACTERIA BLD CULT: ABNORMAL
BACTERIA BLD CULT: ABNORMAL
BACTERIA CSF CULT: NO GROWTH
BASOPHILS # BLD MANUAL: 0 THOUSAND/UL (ref 0–0.1)
BASOPHILS # BLD MANUAL: 0 THOUSAND/UL (ref 0–0.1)
BASOPHILS # BLD MANUAL: 0.13 THOUSAND/UL (ref 0–0.1)
BASOPHILS NFR MAR MANUAL: 0 % (ref 0–1)
BASOPHILS NFR MAR MANUAL: 0 % (ref 0–1)
BASOPHILS NFR MAR MANUAL: 1 % (ref 0–1)
BILIRUB SERPL-MCNC: 1.3 MG/DL (ref 0.2–1)
BLD GP AB SCN SERPL QL: NEGATIVE
BPU ID: NORMAL
BUN SERPL-MCNC: 70 MG/DL (ref 5–25)
BURR CELLS BLD QL SMEAR: PRESENT
CALCIUM ALBUM COR SERPL-MCNC: 11.5 MG/DL (ref 8.3–10.1)
CALCIUM SERPL-MCNC: 9.9 MG/DL (ref 8.4–10.2)
CHLORIDE SERPL-SCNC: 118 MMOL/L (ref 96–108)
CO2 SERPL-SCNC: 17 MMOL/L (ref 21–32)
CREAT SERPL-MCNC: 1.36 MG/DL (ref 0.6–1.3)
CROSSMATCH: NORMAL
CROSSMATCH: NORMAL
DACRYOCYTES BLD QL SMEAR: PRESENT
DOHLE BOD BLD QL SMEAR: PRESENT
EOSINOPHIL # BLD MANUAL: 0 THOUSAND/UL (ref 0–0.4)
EOSINOPHIL NFR BLD MANUAL: 0 % (ref 0–6)
ERYTHROCYTE [DISTWIDTH] IN BLOOD BY AUTOMATED COUNT: 16.4 % (ref 11.6–15.1)
ERYTHROCYTE [DISTWIDTH] IN BLOOD BY AUTOMATED COUNT: 20.6 % (ref 11.6–15.1)
ERYTHROCYTE [DISTWIDTH] IN BLOOD BY AUTOMATED COUNT: 21.1 % (ref 11.6–15.1)
GFR SERPL CREATININE-BSD FRML MDRD: 42 ML/MIN/1.73SQ M
GIANT PLATELETS BLD QL SMEAR: PRESENT
GIANT PLATELETS BLD QL SMEAR: PRESENT
GLUCOSE SERPL-MCNC: 160 MG/DL (ref 65–140)
GLUCOSE SERPL-MCNC: 192 MG/DL (ref 65–140)
GLUCOSE SERPL-MCNC: 216 MG/DL (ref 65–140)
GLUCOSE SERPL-MCNC: 247 MG/DL (ref 65–140)
GLUCOSE SERPL-MCNC: 254 MG/DL (ref 65–140)
GLUCOSE SERPL-MCNC: 265 MG/DL (ref 65–140)
GRAM STN SPEC: ABNORMAL
GRAM STN SPEC: ABNORMAL
HCT VFR BLD AUTO: 20.7 % (ref 34.8–46.1)
HCT VFR BLD AUTO: 21.4 % (ref 34.8–46.1)
HCT VFR BLD AUTO: 22.7 % (ref 34.8–46.1)
HCT VFR BLD AUTO: 24 % (ref 34.8–46.1)
HELMET CELLS BLD QL SMEAR: PRESENT
HELMET CELLS BLD QL SMEAR: PRESENT
HGB BLD-MCNC: 6.7 G/DL (ref 11.5–15.4)
HGB BLD-MCNC: 7.1 G/DL (ref 11.5–15.4)
HGB BLD-MCNC: 7.2 G/DL (ref 11.5–15.4)
HGB BLD-MCNC: 7.6 G/DL (ref 11.5–15.4)
LG PLATELETS BLD QL SMEAR: PRESENT
LYMPHOCYTES # BLD AUTO: 0 % (ref 14–44)
LYMPHOCYTES # BLD AUTO: 0 THOUSAND/UL (ref 0.6–4.47)
LYMPHOCYTES # BLD AUTO: 0.39 THOUSAND/UL (ref 0.6–4.47)
LYMPHOCYTES # BLD AUTO: 0.44 THOUSAND/UL (ref 0.6–4.47)
LYMPHOCYTES # BLD AUTO: 2 % (ref 14–44)
LYMPHOCYTES # BLD AUTO: 3 % (ref 14–44)
MAGNESIUM SERPL-MCNC: 2 MG/DL (ref 1.9–2.7)
MCH RBC QN AUTO: 28.6 PG (ref 26.8–34.3)
MCH RBC QN AUTO: 29 PG (ref 26.8–34.3)
MCH RBC QN AUTO: 30 PG (ref 26.8–34.3)
MCHC RBC AUTO-ENTMCNC: 31.7 G/DL (ref 31.4–37.4)
MCHC RBC AUTO-ENTMCNC: 32.4 G/DL (ref 31.4–37.4)
MCHC RBC AUTO-ENTMCNC: 33.2 G/DL (ref 31.4–37.4)
MCV RBC AUTO: 90 FL (ref 82–98)
METAMYELOCYTES NFR BLD MANUAL: 3 % (ref 0–1)
METAMYELOCYTES NFR BLD MANUAL: 3 % (ref 0–1)
MONOCYTES # BLD AUTO: 0.26 THOUSAND/UL (ref 0–1.22)
MONOCYTES # BLD AUTO: 0.29 THOUSAND/UL (ref 0–1.22)
MONOCYTES # BLD AUTO: 0.3 THOUSAND/UL (ref 0–1.22)
MONOCYTES NFR BLD: 2 % (ref 4–12)
MYELOCYTES NFR BLD MANUAL: 2 % (ref 0–1)
MYELOCYTES NFR BLD MANUAL: 2 % (ref 0–1)
NEUTROPHILS # BLD MANUAL: 11.65 THOUSAND/UL (ref 1.85–7.62)
NEUTROPHILS # BLD MANUAL: 13.28 THOUSAND/UL (ref 1.85–7.62)
NEUTROPHILS # BLD MANUAL: 14.26 THOUSAND/UL (ref 1.85–7.62)
NEUTS BAND NFR BLD MANUAL: 13 % (ref 0–8)
NEUTS BAND NFR BLD MANUAL: 18 % (ref 0–8)
NEUTS BAND NFR BLD MANUAL: 20 % (ref 0–8)
NEUTS SEG NFR BLD AUTO: 70 % (ref 43–75)
NEUTS SEG NFR BLD AUTO: 71 % (ref 43–75)
NEUTS SEG NFR BLD AUTO: 85 % (ref 43–75)
NRBC BLD AUTO-RTO: 12 /100 WBC (ref 0–2)
NRBC BLD AUTO-RTO: 4 /100 WBC (ref 0–2)
NRBC BLD AUTO-RTO: 5 /100 WBC (ref 0–2)
PHOSPHATE SERPL-MCNC: 5.1 MG/DL (ref 2.7–4.5)
PLATELET # BLD AUTO: 43 THOUSANDS/UL (ref 149–390)
PLATELET # BLD AUTO: 62 THOUSANDS/UL (ref 149–390)
PLATELET # BLD AUTO: 69 THOUSANDS/UL (ref 149–390)
PLATELET BLD QL SMEAR: ABNORMAL
POIKILOCYTOSIS BLD QL SMEAR: PRESENT
POLYCHROMASIA BLD QL SMEAR: PRESENT
POTASSIUM SERPL-SCNC: 3.5 MMOL/L (ref 3.5–5.3)
PROT SERPL-MCNC: 4 G/DL (ref 6.4–8.4)
RBC # BLD AUTO: 2.31 MILLION/UL (ref 3.81–5.12)
RBC # BLD AUTO: 2.37 MILLION/UL (ref 3.81–5.12)
RBC # BLD AUTO: 2.52 MILLION/UL (ref 3.81–5.12)
RBC MORPH BLD: PRESENT
RH BLD: NEGATIVE
SCHISTOCYTES BLD QL SMEAR: PRESENT
SODIUM SERPL-SCNC: 150 MMOL/L (ref 135–147)
SPECIMEN EXPIRATION DATE: NORMAL
TOXIC GRANULES BLD QL SMEAR: PRESENT
UNIT DISPENSE STATUS: NORMAL
UNIT PRODUCT CODE: NORMAL
UNIT PRODUCT VOLUME: 350 ML
UNIT RH: NORMAL
VARIANT LYMPHS # BLD AUTO: 1 %
WBC # BLD AUTO: 13.09 THOUSAND/UL (ref 4.31–10.16)
WBC # BLD AUTO: 14.55 THOUSAND/UL (ref 4.31–10.16)
WBC # BLD AUTO: 14.75 THOUSAND/UL (ref 4.31–10.16)
WBC TOXIC VACUOLES BLD QL SMEAR: PRESENT

## 2024-04-13 PROCEDURE — 84100 ASSAY OF PHOSPHORUS: CPT

## 2024-04-13 PROCEDURE — 86900 BLOOD TYPING SEROLOGIC ABO: CPT

## 2024-04-13 PROCEDURE — 82948 REAGENT STRIP/BLOOD GLUCOSE: CPT

## 2024-04-13 PROCEDURE — 99233 SBSQ HOSP IP/OBS HIGH 50: CPT | Performed by: INTERNAL MEDICINE

## 2024-04-13 PROCEDURE — P9040 RBC LEUKOREDUCED IRRADIATED: HCPCS

## 2024-04-13 PROCEDURE — 85027 COMPLETE CBC AUTOMATED: CPT

## 2024-04-13 PROCEDURE — 94003 VENT MGMT INPAT SUBQ DAY: CPT

## 2024-04-13 PROCEDURE — 94760 N-INVAS EAR/PLS OXIMETRY 1: CPT

## 2024-04-13 PROCEDURE — 99291 CRITICAL CARE FIRST HOUR: CPT | Performed by: INTERNAL MEDICINE

## 2024-04-13 PROCEDURE — 85007 BL SMEAR W/DIFF WBC COUNT: CPT

## 2024-04-13 PROCEDURE — 83735 ASSAY OF MAGNESIUM: CPT

## 2024-04-13 PROCEDURE — 86923 COMPATIBILITY TEST ELECTRIC: CPT

## 2024-04-13 PROCEDURE — 80053 COMPREHEN METABOLIC PANEL: CPT

## 2024-04-13 PROCEDURE — C9254 INJECTION, LACOSAMIDE: HCPCS | Performed by: STUDENT IN AN ORGANIZED HEALTH CARE EDUCATION/TRAINING PROGRAM

## 2024-04-13 PROCEDURE — 85018 HEMOGLOBIN: CPT

## 2024-04-13 PROCEDURE — 94640 AIRWAY INHALATION TREATMENT: CPT

## 2024-04-13 PROCEDURE — 86850 RBC ANTIBODY SCREEN: CPT

## 2024-04-13 PROCEDURE — 99024 POSTOP FOLLOW-UP VISIT: CPT | Performed by: SURGERY

## 2024-04-13 PROCEDURE — 85014 HEMATOCRIT: CPT

## 2024-04-13 PROCEDURE — 3E0G8GC INTRODUCTION OF OTHER THERAPEUTIC SUBSTANCE INTO UPPER GI, VIA NATURAL OR ARTIFICIAL OPENING ENDOSCOPIC: ICD-10-PCS | Performed by: INTERNAL MEDICINE

## 2024-04-13 PROCEDURE — 43255 EGD CONTROL BLEEDING ANY: CPT | Performed by: INTERNAL MEDICINE

## 2024-04-13 PROCEDURE — C9113 INJ PANTOPRAZOLE SODIUM, VIA: HCPCS | Performed by: EMERGENCY MEDICINE

## 2024-04-13 PROCEDURE — 86901 BLOOD TYPING SEROLOGIC RH(D): CPT

## 2024-04-13 PROCEDURE — 0W3P8ZZ CONTROL BLEEDING IN GASTROINTESTINAL TRACT, VIA NATURAL OR ARTIFICIAL OPENING ENDOSCOPIC: ICD-10-PCS | Performed by: INTERNAL MEDICINE

## 2024-04-13 PROCEDURE — 94664 DEMO&/EVAL PT USE INHALER: CPT

## 2024-04-13 RX ORDER — FENTANYL CITRATE 50 UG/ML
100 INJECTION, SOLUTION INTRAMUSCULAR; INTRAVENOUS ONCE
Status: COMPLETED | OUTPATIENT
Start: 2024-04-13 | End: 2024-04-13

## 2024-04-13 RX ORDER — ALBUMIN (HUMAN) 12.5 G/50ML
12.5 SOLUTION INTRAVENOUS ONCE
Status: DISCONTINUED | OUTPATIENT
Start: 2024-04-13 | End: 2024-04-13

## 2024-04-13 RX ORDER — EPINEPHRINE 1 MG/ML
INJECTION, SOLUTION, CONCENTRATE INTRAVENOUS AS NEEDED
Status: COMPLETED | OUTPATIENT
Start: 2024-04-13 | End: 2024-04-13

## 2024-04-13 RX ORDER — LACOSAMIDE 10 MG/ML
100 INJECTION, SOLUTION INTRAVENOUS EVERY 12 HOURS
Status: DISCONTINUED | OUTPATIENT
Start: 2024-04-13 | End: 2024-04-18

## 2024-04-13 RX ORDER — PANTOPRAZOLE SODIUM 40 MG/10ML
40 INJECTION, POWDER, LYOPHILIZED, FOR SOLUTION INTRAVENOUS EVERY 12 HOURS SCHEDULED
Status: DISCONTINUED | OUTPATIENT
Start: 2024-04-13 | End: 2024-04-15

## 2024-04-13 RX ORDER — ALBUMIN (HUMAN) 12.5 G/50ML
25 SOLUTION INTRAVENOUS ONCE
Status: COMPLETED | OUTPATIENT
Start: 2024-04-13 | End: 2024-04-13

## 2024-04-13 RX ORDER — METOCLOPRAMIDE HYDROCHLORIDE 5 MG/ML
10 INJECTION INTRAMUSCULAR; INTRAVENOUS ONCE
Status: COMPLETED | OUTPATIENT
Start: 2024-04-13 | End: 2024-04-13

## 2024-04-13 RX ORDER — MIDAZOLAM HYDROCHLORIDE 2 MG/2ML
6 INJECTION, SOLUTION INTRAMUSCULAR; INTRAVENOUS ONCE
Status: COMPLETED | OUTPATIENT
Start: 2024-04-13 | End: 2024-04-13

## 2024-04-13 RX ADMIN — SODIUM CHLORIDE SOLN NEBU 3% 4 ML: 3 NEBU SOLN at 19:22

## 2024-04-13 RX ADMIN — Medication 800 MG: at 10:48

## 2024-04-13 RX ADMIN — NYSTATIN: 100000 POWDER TOPICAL at 18:48

## 2024-04-13 RX ADMIN — PANTOPRAZOLE SODIUM 40 MG: 40 INJECTION, POWDER, FOR SOLUTION INTRAVENOUS at 14:43

## 2024-04-13 RX ADMIN — FENTANYL CITRATE 50 MCG: 50 INJECTION INTRAMUSCULAR; INTRAVENOUS at 02:55

## 2024-04-13 RX ADMIN — SODIUM CHLORIDE SOLN NEBU 3% 4 ML: 3 NEBU SOLN at 07:31

## 2024-04-13 RX ADMIN — INSULIN LISPRO 2 UNITS: 100 INJECTION, SOLUTION INTRAVENOUS; SUBCUTANEOUS at 00:19

## 2024-04-13 RX ADMIN — PIPERACILLIN SODIUM AND TAZOBACTAM SODIUM 4.5 G: 36; 4.5 INJECTION, POWDER, LYOPHILIZED, FOR SOLUTION INTRAVENOUS at 00:22

## 2024-04-13 RX ADMIN — PIPERACILLIN SODIUM AND TAZOBACTAM SODIUM 4.5 G: 36; 4.5 INJECTION, POWDER, LYOPHILIZED, FOR SOLUTION INTRAVENOUS at 10:00

## 2024-04-13 RX ADMIN — CHLORHEXIDINE GLUCONATE 0.12% ORAL RINSE 15 ML: 1.2 LIQUID ORAL at 21:50

## 2024-04-13 RX ADMIN — Medication 20 MG: at 10:00

## 2024-04-13 RX ADMIN — LACOSAMIDE 100 MG: 100 TABLET, FILM COATED ORAL at 10:00

## 2024-04-13 RX ADMIN — INSULIN LISPRO 3 UNITS: 100 INJECTION, SOLUTION INTRAVENOUS; SUBCUTANEOUS at 05:43

## 2024-04-13 RX ADMIN — HEPARIN SODIUM 5000 UNITS: 5000 INJECTION INTRAVENOUS; SUBCUTANEOUS at 05:57

## 2024-04-13 RX ADMIN — FENTANYL CITRATE 100 MCG: 50 INJECTION INTRAMUSCULAR; INTRAVENOUS at 17:55

## 2024-04-13 RX ADMIN — INSULIN LISPRO 3 UNITS: 100 INJECTION, SOLUTION INTRAVENOUS; SUBCUTANEOUS at 12:28

## 2024-04-13 RX ADMIN — IOHEXOL 85 ML: 350 INJECTION, SOLUTION INTRAVENOUS at 15:43

## 2024-04-13 RX ADMIN — METOCLOPRAMIDE 10 MG: 5 INJECTION, SOLUTION INTRAMUSCULAR; INTRAVENOUS at 16:15

## 2024-04-13 RX ADMIN — Medication 2 SPRAY: at 21:52

## 2024-04-13 RX ADMIN — LACTULOSE 20 G: 20 SOLUTION ORAL at 10:00

## 2024-04-13 RX ADMIN — PIPERACILLIN SODIUM AND TAZOBACTAM SODIUM 4.5 G: 36; 4.5 INJECTION, POWDER, LYOPHILIZED, FOR SOLUTION INTRAVENOUS at 16:19

## 2024-04-13 RX ADMIN — ALBUMIN (HUMAN) 25 G: 0.25 INJECTION, SOLUTION INTRAVENOUS at 16:28

## 2024-04-13 RX ADMIN — Medication 2 SPRAY: at 10:24

## 2024-04-13 RX ADMIN — CHLORHEXIDINE GLUCONATE 0.12% ORAL RINSE 15 ML: 1.2 LIQUID ORAL at 10:00

## 2024-04-13 RX ADMIN — SODIUM CHLORIDE SOLN NEBU 3% 4 ML: 3 NEBU SOLN at 14:25

## 2024-04-13 RX ADMIN — ALBUMIN (HUMAN) 25 G: 0.25 INJECTION, SOLUTION INTRAVENOUS at 10:03

## 2024-04-13 RX ADMIN — MIDAZOLAM 3 MG: 1 INJECTION INTRAMUSCULAR; INTRAVENOUS at 18:15

## 2024-04-13 RX ADMIN — LACOSAMIDE 100 MG: 10 INJECTION, SOLUTION INTRAVENOUS at 21:50

## 2024-04-13 RX ADMIN — EPINEPHRINE 0.3 MG: 1 INJECTION, SOLUTION, CONCENTRATE INTRAVENOUS at 18:23

## 2024-04-13 RX ADMIN — INSULIN LISPRO 2 UNITS: 100 INJECTION, SOLUTION INTRAVENOUS; SUBCUTANEOUS at 19:26

## 2024-04-13 RX ADMIN — POLYETHYLENE GLYCOL 3350 17 G: 17 POWDER, FOR SOLUTION ORAL at 10:00

## 2024-04-13 RX ADMIN — NYSTATIN: 100000 POWDER TOPICAL at 11:21

## 2024-04-13 RX ADMIN — PANTOPRAZOLE SODIUM 40 MG: 40 INJECTION, POWDER, FOR SOLUTION INTRAVENOUS at 21:50

## 2024-04-13 NOTE — RESPIRATORY THERAPY NOTE
04/13/24 0419   Respiratory Assessment   Assessment Type Assess only   General Appearance Sleeping   Respiratory Pattern Assisted   Chest Assessment Chest expansion symmetrical   Bilateral Breath Sounds Rhonchi;Coarse   R Breath Sounds Coarse   Cough None   Suction Trach   Resp Comments Pt. remians on SPONT, tolerating well.   O2 Device G5   Vent Information   Vent ID [id number]   Vent type Summers G5   Summers Vent Mode SPONT   $ Vent Daily Charge-Subsequent Yes   $ Pulse Oximetry Spot Check Charge Completed   SPONT Settings   FIO2 (%) 40 %   PEEP (cmH2O) 6 cmH2O   Pressure Support (cmH2O) 10 cmH20   Flow Trigger (LPM) 5 LPM   Trigger Sensitivity Pressure (cm H2O)  2 cm H2O   P-ramp (ms) 100 ms   ETS  (%) 25 %   Humidification Heater   Heater Temp 95 °F (35 °C)   SPONT Actuals   Resp Rate (BPM) 21 BPM   VT (mL) 547 mL   MV (Obs) 11.1   MAP (cmH2O) 8.4 cmH2O   Peak Pressure (cmH2O) 19 cmH2O   I:E Ratio (Obs) 1/3.0   RSBI (f/vt) 38 f/Vt   Heater Temperature (Obs) 95 °F (35 °C)   SPONT Alarms   High Peak Pressure (cmH20) 40 cmH2O   High Resp Rate (BPM) 40 BPM   High MV (L/min) 20 L/min   Low MV (L/min) 3 L/min   High Spont VTE (mL) 1000 mL   Low Spont VTE (mL) 200 mL   Apnea Time (S) 20 S   SPONT Apnea Settings   Resp Rate (BPM) 12 BPM   FIO2 (%) 40 %   %TI (%) 0.7 %   Apnea Time (S) 20 S   Maintenance   Alarm (pink) cable attached No   Resuscitation bag with peep valve at bedside Yes   Water bag changed No   Circuit changed No   Surgical Airway Jaleesaley Cuffed   Placement Date/Time: 03/12/24 1200   Tube Size: 8 mm  Type: Tracheostomy  Brand: Naveen  Style: Cuffed   Status Cuff Inflated   Site Assessment Clean;Dry   Ties Assessment Clean;Dry;Intact   Surgical Airway Cuff Pressure (cm H20)   (MLT)   Equipment at bedside BVM;Wall Suction setup;Additional complete same size trach tube;Additional complete one size smaller trach tube;Obturator;Sterile saline;Additional inner cannula     RT Ventilator Management Note  Carla  Marilee 59 y.o. female MRN: [id number]  Unit/Bed#: [facility] 10 Encounter: [id number]      Daily Screen         4/12/2024  0747 4/12/2024 1936          Patient safety screen outcome:: Passed Passed                Physical Exam:   Assessment Type: (P) Assess only  General Appearance: (P) Sleeping  Respiratory Pattern: (P) Assisted  Chest Assessment: (P) Chest expansion symmetrical  Bilateral Breath Sounds: (P) Rhonchi, Coarse  R Breath Sounds: (P) Coarse  Cough: (P) None  Suction: (P) Trach  O2 Device: (P) G5      Resp Comments: (P) Pt. remians on SPONT, tolerating well.

## 2024-04-13 NOTE — QUICK NOTE
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, currently with danie blood through ileostomy    VS: Tachycardic, /62  (on levophed); last hemoglobin 7.6 at 12:40pm today    Plan:  Unclear source of bleeding  Risk factors for upper gi bleeding including mechanical ventilation, corticosteroid use (recently tapered)  EGD done on 4/3 showed esophageal ulceration and clotting in the stomach with source not visualized  Could also be lower Gi bleeding in the setting of ileocecetomy complicated by wound dehiscence requiring VAC  Agree with critical care team plan for emergent CT high volume bleed scan due to unstable hemodynamics  After results can plan for EGD if there is any upper GI source visualized  Please reach out to GI on call for questions/concerns    Anil Benton MD  Gastroenterology Fellow

## 2024-04-13 NOTE — PROGRESS NOTES
[facility]  Progress Note: Critical Care  Name: Carla Hunt 59 y.o. female I MRN: [id number]  Unit/Bed#: MICU 10 I Date of Admission: 1/10/2024   Date of Service: 4/13/2024 I Hospital Day: 94  Neuro:  #Alertness & analgesia  - OFF modafinil 100mg qd as of 4/8  - Delirium precautions  - Patient is awake and opening eyes but not moving purposefully or responding to commands, will hold on any sedating medications at this time  - Hold analgesia and sedation aside from periprocedural requirements     #Metabolic encephalopathy; POA  - Waxing and waning neuro exam since admission  - Most recent repeat CTH 4/5 without acute intracranial abnormality - obtained due to decline in mental status after interval improvement  - MRI brain on 4/6 showed findings suggestive of embolic etiology without significant edema, mass effect or hemorrhagic transformation (suspect septic emboli)  - Repeat MRI brain on 4/10 (obtained due to concern for acute decreased attenuation of the left hemisphere) confirms likely [septic] embolic strokes from prior scans + several new small areas of infarction + a small new hemorrhage in the left inferior parietal lobe.  - Electrolytes, sodium, hyperglycemia 2/2 high dose steroids requiring insulin gtt. Ammonia normal. TME may be prolonged 2/2 PNA, possibly worsened by uremic encephalopathy worsened by ELIESER now resolved, uremia improving s/p CRRT   - Bcx 2/2 (3/31) growing fungemia, identified as candida albicans  - Cryptococcal ag negative  - Aspergillus galactomannan ag negative  - Tb quantiferon gold indeterminate 2/2 lymphopenia    --  Plan:  - Corticosteroids now weaned to off  - CRRT off with persistent uremia   - Follow up serial blood cultures - positive for yeast  - Plan for repeat as TTE as below  - Possible concomitant intraabdominal infection related to ostomy (see ID A/P) contributing to TME in setting of other comorbidities affecting mentation  (embolic strokes, left inferior parietal IPH, prolonged critical illness with associated delirium)  - Appreciate Neurology input              - Will obtain CTA head/neck today per Neurology recs to assess for mycotic aneurysms as a contribution to the embolic strokes  - Ammonia elevated; start lactulose and titrate to 3-4 BM per day              - Can add rifaximin if needed  - Frequent neurochecks     #CVA due to multiple bilateral foci of acute infarcts   - MRI brain 4/6- multiple small bilateral foci of acute infarcts. No significant edema, mass effect, or hemorrhagic transformation -- suspicious for septic embolic phenomenon  - MRI brain on 4/10 showed known infarcts with several new small infarcts  - Not on AP/AC. Recent TTE 4/1 without vegetation   Plan:  - SATURNINO requested to assess for endocarditis in light of brain MRI concerning for septic emboli in setting of fungemia              - Patient does have a PFO on bubble study with a DVT in the RLE which also contributes to stroke risk              - SATURNINO aborted due to difficult access to esophagus despite visual aid by bronchoscope, will discuss with GI about possible [nonurgent] endoscopic assistance   - Avoid ASA due to small hemorraghic conversion of septic emboli  - Frequent neuro checks  - plan for repeat TTE per ID to reassess for vegetations     #Seizure disorder, known history  - S/p partial left temporal lobe resection in 2007 2/2 complex migraines  - On Lamictal 150 bid and Topamax 50mg bid at home  - Last lamotrigine level from 03/02 at 10.7 (therapeutic)  - Home Lamictal switched to Vimpat 3/27 due to worsening pancytopenia, neuro following  - Continue Vimpat 100mg bid maintenance dose  - Seizure precautions      #Anxiety, known history  -On Effexor 12.5 mg TID     CV:  #Sinus tachycardia  - TTE 3/17 with no major structural dysfunction  - Multifactorial 2/2 deconditioning, dehydration/hypvol, acute on chronic anemia, underlying  infectious/inflammatory process, pain/agitation  - TTE 4/1 with EF 70%, no WMA, no changes from prior  - TTE with bubble study 4/7 showed LVEF 70% with right to left shunting related to PFO vs intrapulmonary shunt  - SATURNINO 4/11 as below  - Etiology likely secondary to infectious processes (fungemia, intra-abdominal infection related to ostomy, pneumonia) which are being addressed              - No need for beta blockade after discussion with Cardiology     Pulm:  #Acute hypoxic respiratory failure;  #Bilateral ground glass opacities, improving  - Vent dependent; s/p trach 3/12 after was reintubated 3/11 due to increased WOB  - Complicated by recurrent bacterial PNA treated w 10d levaquin (completed 2/25) for MDR PNA; most recent BAL +recurrent stenotrophomona treated with Bactrim, switched to minocyline 14d course completed 3/28.  - Etiology likely multifactorial including possible COVID pneumonitis vs recurrent PNA vs hypersensitivity pneumonitis 2/2 ?rituxumab. Persistent inflammation likely given patient has been steroid responsive throughout admission.   - Repeat CXR 3/22 with significant improvement of multifocal PNA. 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 2/2 poor wound healing midline incision  - Poor wound healing likely due to high dose steroid therapy s/p wound vac that was removed 3/17 by gen surg  now s/p s/p OR 4/5 with surgically created mucus fistula and end ileostomy  Plan:  - Wound vac as per general surgery  - ALP and GGT elevated; likely ADR of fluconazole              - RUQ without acute biliary abnormality  - CT abdomen/pelvis with concern for purulent material in the peritoneum and collection adjacent to the ostomy (after discussion with Surgery; read from Radiology notes hemoperitoneum)              - S/p IR drainage of fluid at same time as LP              - Appreciate Surgery assistance with this              - Zosyn as below  - Lactulose as above; titrate to 3-4 BM per day              - Can add rifaximin if needed              - Follow up NH3 in a few days pending mental status improvement  - Monitor ostomy pouch output  - ID following  - General surgery following     #Oropharyngeal dysphagia   - Has had multiple and prolonged intubations now s/p trach   - VBS 3/27 oropharyngeal dysphagia  - Continue tube feeds for now until potential PEG placement pending abdominal wound healing as per Gen Surg     #Esophagitis   - Acute on chronic anemia associated bloody NG output on 4/3  - Uremic, worsening suggestive of UGIB given relatively stable Cr  - Bedside EGD x2 (4/2,4/3) Ulcerated mucosa in the upper third of the esophagus without evidence of hemorrhage   Plan:  HSV/CMV/candida from GI sample negative  Continue PPI IV bid  Monitor NGT output, stool output  Concern for upper GI bleed given increasing BUN and decreased Hb (recently requiring transfusion 4/8 overnight) with thrombocytopenia and TEG normal; will lavage NGT if needed to assess for upper GI bleed as EGD did reveal ulcerations     :  #Uremia  - ?catabolic from steroids, may also have ?slow UGIB in setting of prolonged steroids though no overt s/s of GIB and H&H stable since 3/23 and patient is on ppi  - Trending up   - EGD without active source of bleeding but did note ulcerations  - FEurea suggestive of intrinsic pathology   - GFR  largely stable, although slowly down trending  - Steroids now off  - Persistent off CRRT              - See above re lavage NGT plan  - BMP daily, uremia currently stable              - Cr slowly trending up; may be falsely elevated due to prolonged course of Bactrim so will d/c and monitor Cr (d/c 4/14/24)  - 25 albumin x1   - NH3 elevated; start lactulose +/- rifaximin as above        F/E/N:  F: intermittent IVF boluses as needed  E: monitor and replete for goal K>4, P>3, Mg>2, increase FWF to 200mL q6hrs  N: tube feeds with Nepro 45 cc/h, Prosource liquid protein to 1 packet, Refugio BID to support wound healing, 200 cc FWF q6h         Heme/Onc:  #Pancytopenia, improving with intermittent plt/prbc's transfusion, s/p Filgastrim x3  - In setting of hx of B cell lymphoma, prior chemo, Rituxan therapy, prior abx and present meds including lamictal  - Hemo onc consulted, empirically given Filgastrim 300mg qd x3d (completed 3/30); IR bone marrow bx deferred by heme-onc   - Lamictal switched to Vimpat in consultation with neuro as above due to potential contribution to pancytopenia  - Trend CBC and diff daily, neutropenic precautions for ANC <500     #Acute on chronic anemia  - Baseline Hgb ~7-8. S/P 2U PRBCs 3/17 after repeat Hgb 5.3, total of 6U transfused since admission.  - Patient had significant blood loss from ostomy site 3/20, resulting in hemorraghic shock, improved with blood transfusion; hemostasis achieved after bleeding source identified and sutured. Another large vol danie bloody output from osotomy on 3/21, bleeding source within ostomy site, sutured.    - Also having intermittent vaginal bleeding  - EGD 4/3,4/4 without active bleeding  - hb drop to 6.7 after having bloody output s/p overnight cauterization and 1 unit PRBC  - ?Worsened by impaired marrow response liekly 2/2 persistent inflammation due to low retic index ~1 prior to most recent transfusions; normocytic with normal B12/folate levels; MCV<100.  Iatrogenic cause likely contributing 2/2 frequent blood draws; chronic anemia likely persistent inflammatory state/CKD/lymphoma in setting of elevated ferritin of 974. SPEP/UPEP negative.  Plan:  - Routine monitoring ostomy output, if bleeding, apply direct pressure and contact gen surg STAT for hemostasis .  - Continue tube feeds/nutritional support  - Give FFP/vitamin K to correct INR as indicated  - Trend CBC, transfuse Transfuse with leukoreduced and irradiated RBCs to maintain Hgb>7     #Thrombocytopenia  - Likely multifactorial including imparied production from marrow dysfunction in setting of persistent inflammation; RI low suggestive of hyperproliferation, hx of B-cell lymphoma, recent infections including persistent COVID, PNA treated, CMV negative. No known history of vWD/congenital disorders. No liver dysfunction; recent hepatic profile unremarkable. HIT workup negative, Hemolytic workup negative. No s/s DIC. No mechanical valves. ?Primary ITP.  Plan:  - Filgrastim 300mg x3 to aid in plt recovery  - Transfuse with leukoreduced and irradiated PLTs if count <20k due to increased risk of bleeding   - Goal >50k if bleeding; goal >20K if no bleeding  - DVT ppx ok with Plt >50k, start heparin subq 4/11  - See plan under acute on chronic anemia      # Lymphopenia with bandemia  - In setting of high dose steroids, now off  - Severely depressed immunoglobulins inclding IgG, IgA, IgM  - At risk for further infections  - Filgrastim 300mg x3 to aid in plt recovery  - Contact and airborne precautions     #B cell lymphoma  - Follows with heme[facility]onc at [facility]  - S/P 6 cycles of chemotherapy in 20222  - Maintained on Rituxan for 2 year course PTA, started May 2022, last dose was 12/2024, treatment currently on hold in setting of persistent COVID-19 infection  Anti-Ritux Ab negative  Plan:  - Holding rituximab in setting of persistent COVID-19 infection, now resolved.  ?resume rituxubmab pending clinical stability &  anti-ritux ab status in consultation with heme-onc     DVT ppx: Lovenox     Endo:  #Steroid hyperglycemia and diabetes mellitus type 2, A1c at 6.6 from 01/2024  - Goal -180  - Utilize SSI no insulin gtt     #R thyroid gland enlargement  - Seen on CT 4/5  - Follow up US 4/5- Limited exam due to overlying tracheostomy tube and central line catheter. Heterogeneous lobular appearance to the remaining thyroid gland, nonspecific, question sequela of thyroiditis. No discrete nodule identified.   T4/TSH unremarkable   Plan: no longer planning thyroid FNA biopsy; likely bleed related to tracheostomy insertion  Will need follow up thyroid US sometime week of 4/15 to follow up     ID:  #Fungemia   BCx growing yeast - most recently on 4/8, ID panel- candida albicans  In setting of severe lymphopenia and severely depressed immunoglobulins   Septic emboli noted  Ophtho consulted, no evidence of ophthalmic endophthalmitis   Plan:   Continue fluconazole 800 mg qd (ELIESER resolved on CRRT); will discuss with ID about escalating to other antifungal agents given persistent fungemia on blood cultures  Follow up LP: AFB, fungal culture, bacterial culture, crypto, cytology, ME panel, autoimmune panel pending  SATURNINO per Cardiology postponed  Avoid broad spectrum abx as it can contribute to fungal growth  Continue Zosyn as there is now concern for intraabdominal infection related to ostomy  HD cath out  F/U Repeat serial Bcx (q48h) to ensure clearance    ID following; all Abx will need dose adjustments if renal function worsens     #PCP ppx  Stop Bactrim as steroids are off after 4/12     #Recurrent bacterial pneumonia  - Patient has completed multiple treatment courses of remdesivir throughout hospitalization in addition to 7 days of ceftriaxone.  - BAL cultures in 2/2024 positive for MDR Pseudomonas and stenotrophomonas treated with 10d course of Levaquin  - CT C/A/P 3/10 with persistent groundglass opacities and changes suggestive of  sequelae of COVID, prior infections.  Completed 10d course of cefepime for broad spectrum coverage (completed (3/13)  - Repeat BAL cultures from 3/11 showing 4+ growth Stenotrophomonas maltophilia. Could be colonization given prior BAL growth of same, however due to recent intubation with prolonged corticosteroid theraphy, will begin treating as true pathogen. Patient otherwise remains afebrile, no leukocytosis. CRP with down trending.  Previously on Bactrim from 3/13 to 3/18, discontinued due to worsening ELIESER  Plan:  - S/P 14d Minocycline course to tx stenotrophomonas PNA, completed 3/27   - IV steroids as above  - Minocycline as sputum Cx growing steno (3/31) - off 4/11        MSK/Skin:  #Midline incision wound with poor wound healing-  - General surgery performed staple removal of the patient's midline incision on 3/12 with some skin signs appreciated at the inferior aspect of the wound without obvious pus drainage. Overnight 3/13, wound dehiscence progressed requiring general surgery reevaluation. Red/brown aspirate noted, fascia intact. Wet-to-dry dressings in place. General surgery following for next Epson wound care.  - Poor wound healing in setting of high-dose steroid therapy.  No  exam no obvious signs of infection at this time.   - S/P wound vac replacement 3/13 as per gen surgery. No active concerns for cellulitis.  Plan:  - Wound VAC management as per general surgery  - Wound care for peritracheostomy wound  - Abdominal wound care as per general surgery  - Routine monitoring     #Critical illness myopathy  - Likely exacerbated by high-dose steroid therapy  Plan:  - Continue to wean steroids as above  - Aggressive PT/OT once clinically stable     Disposition: Critical care    ICU Core Measures     Vented Patient  VAP Bundle  VAP bundle ordered     A: Assess, Prevent, and Manage Pain Has pain been assessed? Yes  Need for changes to pain regimen? No   B: Both Spontaneous Awakening Trials (SATs) and  Spontaneous Breathing Trials (SBTs) Plan to perform spontaneous awakening trial today? N/A   Plan to perform spontaneous breathing trial today? N/A   Obvious barriers to extubation? NA   C: Choice of Sedation RASS Goal: 0 Alert and Calm  Need for changes to sedation or analgesia regimen? NA   D: Delirium CAM-ICU: Unable to perform secondary to Acute cognitive dysfunction   E: Early Mobility  Plan for early mobility? NA   F: Family Engagement Plan for family engagement today? Yes       Antibiotic Review: Patient on appropriate coverage based on culture data.     Review of Invasive Devices:    Ortiz Plan: Continue for accurate I/O monitoring for 48 hours        Prophylaxis:  VTE VTE covered by:  heparin (porcine), Subcutaneous, 5,000 Units at 04/13/24 0557       Stress Ulcer  covered byomeprazole (PRILOSEC) suspension 2 mg/mL [[id number]]        Significant 24hr Events     24hr events: overnight events: oozing from ostomy and abdominal wound. Surgery evaluated, irrigated wound and cauterized areas of bleeding.       Subjective     Review of Systems     Objective                            Vitals I/O      Most Recent Min/Max in 24hrs   Temp 98.7 °F (37.1 °C) Temp  Min: 98.4 °F (36.9 °C)  Max: 100.2 °F (37.9 °C)   Pulse (!) 116 Pulse  Min: 116  Max: 127   Resp (!) 32 Resp  Min: 18  Max: 34   /66 BP  Min: 86/54  Max: 131/68   O2 Sat 100 % SpO2  Min: 98 %  Max: 100 %      Intake/Output Summary (Last 24 hours) at 4/13/2024 1237  Last data filed at 4/13/2024 1136  Gross per 24 hour   Intake 2934 ml   Output 1545 ml   Net 1389 ml       Diet Enteral/Parenteral; Tube Feeding No Oral Diet; Nepro; Cyclic; 45; 20 hours; Prosource Protein Liquid - One Packet; Refguio Unflavored - One Packet; BID; 100; Water; Every 6 hours    Invasive Monitoring           Physical Exam   Physical Exam  Neck:      Trachea: Tracheostomy present.   Cardiovascular:      Rate and Rhythm: Regular rhythm. Tachycardia present.   Abdominal: General: An  ostomy site is present. There is ostomy site.  Constitutional:       General: She is not in acute distress.     Appearance: She is ill-appearing and toxic-appearing.   Pulmonary:      Effort: No respiratory distress.      Breath sounds: Normal breath sounds.   Neurological:      Mental Status: She is unresponsive.            Diagnostic Studies      EKG: reviewed  Imaging:  I have personally reviewed pertinent reports.   and I have personally reviewed pertinent films in PACS     Medications:  Scheduled PRN   chlorhexidine, 15 mL, Q12H SARTHAK  fluconazole, 800 mg, Q24H  heparin (porcine), 5,000 Units, Q8H SARTHAK  insulin lispro, 1-6 Units, Q6H SARTHAK  lacosamide, 100 mg, Q12H SARTHAK  lactulose, 20 g, TID  nystatin, , BID  omeprazole (PRILOSEC) suspension 2 mg/mL, 20 mg, Daily  piperacillin-tazobactam, 4.5 g, Q8H  polyethylene glycol, 17 g, Daily  sodium chloride, 2 spray, BID  sodium chloride, 4 mL, TID      acetaminophen, 650 mg, Q6H PRN  fentaNYL, 50 mcg, Q1H PRN  ondansetron, 4 mg, Q6H PRN  sodium chloride, 1 Application, Q1H PRN       Continuous    dexmedetomidine, 0.1-0.7 mcg/kg/hr, Last Rate: Stopped (04/11/24 1224)  norepinephrine, 1-30 mcg/min, Last Rate: 1 mcg/min (04/11/24 1242)         Labs:    CBC    Recent Labs     04/12/24  2302 04/13/24  0554   WBC 14.55* 14.75*   HGB 7.2* 6.7*   HCT 22.7* 20.7*   PLT 62* 69*   BANDSPCT 13* 20*     BMP    Recent Labs     04/12/24  1656 04/13/24  0554   SODIUM 149* 150*   K 3.7 3.5   * 118*   CO2 18* 17*   AGAP 15* 15*   BUN 66* 70*   CREATININE 1.27 1.36*   CALCIUM 10.0 9.9       Coags    No recent results     Additional Electrolytes  Recent Labs     04/12/24  0521 04/13/24  0554   MG 2.0 2.0   PHOS 6.3* 5.1*          Blood Gas    No recent results  No recent results LFTs  Recent Labs     04/12/24  0521 04/13/24  0554   ALT 39 31   AST 22 16   ALKPHOS 845* 743*   ALB 2.5* 2.0*   TBILI 1.84* 1.30*       Infectious  No recent results  Glucose  Recent Labs     04/12/24  0521  04/12/24  1656 04/13/24  54   [facility] [place] 139 243* 254*               Susan Prince, DO

## 2024-04-13 NOTE — RESPIRATORY THERAPY NOTE
RT Ventilator Management Note  Carla Hunt 59 y.o. female MRN: [id number]  Unit/Bed#: [facility] 10 Encounter: [id number]      Daily Screen         4/12/2024 1936 4/13/2024  0732          Patient safety screen outcome:: Passed Passed (P)                 Physical Exam:   Assessment Type: Assess only  General Appearance: Sleeping  Respiratory Pattern: Assisted  Chest Assessment: Chest expansion symmetrical  Bilateral Breath Sounds: Rhonchi, Coarse  R Breath Sounds: Coarse  Cough: None  Suction: Trach  O2 Device: G5      Resp Comments: (P) Pt received on SPONT 10/6 40%. PT tolerating these settings well, no vent changes made at this time.

## 2024-04-13 NOTE — PROGRESS NOTES
Pastoral Care Progress Note    2024  Patient: Carla Hunt : 1965  Admission Date & Time: 1/10/2024 1941  MRN: [id number] [facility]: [id number]       24 1700   Clinical Encounter Type   Visited With Family   Routine Visit Follow-up   Referral From    Referral To       Intern met with pt's  (Regan) as a follow-up from another .  Intern asked the pt's  if he needed anything. He declined at this time.  Intern informed him on how to contact chaplains if needed. Spiritual care remains available.    Chaplaincy Outcomes Achieved:  Declined  support

## 2024-04-13 NOTE — PLAN OF CARE
Problem: Prexisting or High Potential for Compromised Skin Integrity  Goal: Skin integrity is maintained or improved  Description: INTERVENTIONS:  - Identify patients at risk for skin breakdown  - Assess and monitor skin integrity  - Assess and monitor nutrition and hydration status  - Monitor labs   - Assess for incontinence   - Turn and reposition patient  - Assist with mobility/ambulation  - Relieve pressure over bony prominences  - Avoid friction and shearing  - Provide appropriate hygiene as needed including keeping skin clean and dry  - Evaluate need for skin moisturizer/barrier cream  - Collaborate with interdisciplinary team   - Patient/family teaching  - Consider wound care consult   Outcome: Progressing     Problem: SAFETY ADULT  Goal: Patient will remain free of falls  Description: INTERVENTIONS:  - Educate patient/family on patient safety including physical limitations  - Instruct patient to call for assistance with activity   - Consult OT/PT to assist with strengthening/mobility   - Keep Call bell within reach  - Keep bed low and locked with side rails adjusted as appropriate  - Keep care items and personal belongings within reach  - Initiate and maintain comfort rounds  - Make Fall Risk Sign visible to staff  - Offer Toileting every 2 Hours, in advance of need  - Initiate/Maintain bed alarm  - Obtain necessary fall risk management equipment: non skid footwear  - Apply yellow socks and bracelet for high fall risk patients  - Consider moving patient to room near nurses station  Outcome: Progressing     Problem: SKIN/TISSUE INTEGRITY - ADULT  Goal: Skin Integrity remains intact(Skin Breakdown Prevention)  Description: Assess:  -Perform Flavio assessment every shift   -Clean and moisturize skin every day   -Inspect skin when repositioning, toileting, and assisting with ADLS  -Assess under medical devices such as ronny  every hour   -Assess extremities for adequate circulation and sensation     Bed  Management:  -Have minimal linens on bed & keep smooth, unwrinkled  -Change linens as needed when moist or perspiring  -Avoid sitting or lying in one position for more than 2 hours while in bed  -Keep HOB at 45 degrees     Toileting:  -Offer bedside commode  -Assess for incontinence every hour  -Use incontinent care products after each incontinent episode such as moisture barrier cream     Activity:  -Mobilize patient 3 times a day  -Encourage activity and walks on unit  -Encourage or provide ROM exercises   -Turn and reposition patient every 2 Hours  -Use appropriate equipment to lift or move patient in bed  -Instruct/ Assist with weight shifting every hour when out of bed in chair  -Consider limitation of chair time 2 hour intervals    Skin Care:  -Avoid use of baby powder, tape, friction and shearing, hot water or constrictive clothing  -Relieve pressure over bony prominences using allevyn   -Do not massage red bony areas    Next Steps:  -Teach patient strategies to minimize risks such as weight shifting    -Consider consults to  interdisciplinary teams such as PT  Outcome: Progressing     Problem: Potential for Falls  Goal: Patient will remain free of falls  Description: INTERVENTIONS:  - Educate patient/family on patient safety including physical limitations  - Instruct patient to call for assistance with activity   - Consult OT/PT to assist with strengthening/mobility   - Keep Call bell within reach  - Keep bed low and locked with side rails adjusted as appropriate  - Keep care items and personal belongings within reach  - Initiate and maintain comfort rounds  - Make Fall Risk Sign visible to staff  - Offer Toileting every 2 Hours, in advance of need  - Initiate/Maintain bed alarm  - Obtain necessary fall risk management equipment: non skid footwear  - Apply yellow socks and bracelet for high fall risk patients  - Consider moving patient to room near nurses station  Outcome: Progressing     Problem:  Nutrition/Hydration-ADULT  Goal: Nutrient/Hydration intake appropriate for improving, restoring or maintaining nutritional needs  Description: Monitor and assess patient's nutrition/hydration status for malnutrition. Collaborate with interdisciplinary team and initiate plan and interventions as ordered.  Monitor patient's weight and dietary intake as ordered or per policy. Utilize nutrition screening tool and intervene as necessary. Determine patient's food preferences and provide high-protein, high-caloric foods as appropriate.     INTERVENTIONS:  - Monitor oral intake, urinary output, labs, and treatment plans  - Assess nutrition and hydration status and recommend course of action  - Evaluate amount of meals eaten  - Assist patient with eating if necessary   - Allow adequate time for meals  - Recommend/ encourage appropriate diets, oral nutritional supplements, and vitamin/mineral supplements  - Order, calculate, and assess calorie counts as needed  - Recommend, monitor, and adjust tube feedings and TPN/PPN based on assessed needs  - Assess need for intravenous fluids  - Provide specific nutrition/hydration education as appropriate  - Include patient/family/caregiver in decisions related to nutrition  Outcome: Progressing

## 2024-04-13 NOTE — QUICK NOTE
Pt evaluated at bedside. William bloody output through NG tube. Hb had dropped from 8.8 to 6.7 in last 24 hours. CT gi bleeding scan finished, waiting for final report but on personal review likely has some foci in posterior stomach wall. Continue resuscitation with PRBC transfusions, IV PPI, IV Reglan administered. Will plan for emergent EGD. Discussed risks with the patient's  at bedside. Consent is obtained.

## 2024-04-13 NOTE — PROGRESS NOTES
Progress Note - General Surgery   Carla Hunt 59 y.o. female MRN: [id number]  Unit/Bed#: [facility] 10 Encounter: [id number]    Assessment:  59F with COVID/strep pna, B cell lymphoma on rituxumab, CKD; prolonged hospitalization 2/2 acute hypoxic respiratory failure s/p MICU bedside trach, hospitalization complicated by cecal volvulus s/p ileocectomy, mucus fistula, end ileostomy on 3/13, anticipated poor wound healing on prolonged high dose steroids and neutropenia; candidemia with noted UGI bleed s/p EGD with esophageal ulcerations per GI  S/p Or 4/4 for midline wound exploration with mucus fistula and end ileostomy viable appearing and above the fascia, noted nonviable subq tissue debrided and skin bridge between ostomy and midline incision removed, small area of exposed bowel protected and stoma isolated  Course complicated by pelvic abscess s/p IR drain placement on 4/10 and persistent candidemia   DVT with presumed paradoxical emboli  Multiple embolic strokes  S/p aborted SATURNINO 4/11    Vent: S(spontaneous)  Gtt: Off pressors  Tachycardia 110's, afebrile, normotensive   Ortiz 1.47 L  NG with TF running at 45cc/hr  Wound manager 360 cc liquid and semi solid brown stool and clots  IR drain not recorded; output purulent    WBC 14.7 from 14.5  Hb 6.7 from 7.2  CMP pending    4/9 Blood cultures: Candida albicans  4/10 Blood cultures: Budding yeast  4/10 IR cultures: 1+ yeast  4/10 Lumbar puncture culture: No growth    Plan:  Slowly advance tube feeds to goal as tolerated  Will continue twice daily dressing changes with attempted ileostomy isolation and packing of the rest of the wound; additional dressing changes as needed  Appreciate ID recommendations, continue high-dose fluconazole, IV Zosyn, minocycline discontinued  Rest of care per ICU team    Subjective/Objective     Subjective: Ileostomy/packing required change overnight due to leakage and clot in bag.  Having output from ileostomy.  Maintained on spontaneous  "tracheostomy settings    Objective:     Blood pressure 96/57, pulse (!) 124, temperature 99.3 °F (37.4 °C), resp. rate (!) 25, height 5' 8\" (1.727 m), weight 86 kg (189 lb 9.5 oz), SpO2 100%.,Body mass index is 28.83 kg/m².      Intake/Output Summary (Last 24 hours) at 4/13/2024 1033  Last data filed at 4/13/2024 1006  Gross per 24 hour   Intake 2584 ml   Output 2105 ml   Net 479 ml       Invasive Devices       Peripheral Intravenous Line  Duration             Peripheral IV 04/09/24 Left;Proximal;Ventral (anterior) Forearm 4 days    Peripheral IV 04/12/24 Right;Ventral (anterior) Forearm <1 day              Drain  Duration             Ileostomy RUQ 52 days    Urethral Catheter Three way 18 Fr. 12 days    NG/OG/Enteral Tube Nasogastric 18 Fr Right nare 9 days    Abscess Drain Buttock 2 days              Airway  Duration             Surgical Airway Shiley Cuffed 31 days                    Physical Exam:   Gen:    Ill-appearing  CV:      warm, well-perfused  Lungs: S (CMV) 16/400/6/40  Abd:     IR drain purulent, wound manager liquid and semisolid stool mixed with clot, NG tube with TF@45, abdomen soft nondistended  Ext:      no CCE  Neuro: Opens eyes but does not follow commands    "

## 2024-04-13 NOTE — PROGRESS NOTES
Progress Note - Infectious Disease   Carla Hunt 59 y.o. female MRN: [id number]  [facility] [id number]      Impression/Recommendations:  Sepsis, recurrent since admission.    Due to COVID-19 infection, recurrent pneumonias, cecal volvulus status post surgery and wound dehiscence, now with persistent candidemia, fecal contamination of midline wound, multiple embolic strokes.  Most recently fungemia, intraabdominal abscess.  Remains critically ill              -Continue antibiotics, antifungals as below  -Follow temperatures closely  -Recheck CBC diff in AM to monitor infection  -Supportive care as per the primary service  -Follow-up pending cultures, drain culture     Intra-abdominal abscess versus hemoperitoneum.    Status post IR drain placement to pelvic collection 4/10 which yielded pus.  Culture shows heavy growth of Candida albicans so likely a source of persistent fungemia.     -Continue IV fluconazole   -Follow drain output closely    Persistent C. Albicans candidemia.    Initial positive cultures 3/31 are growing Candida albicans, susceptible to fluconazole.  Suspect ileostomy retraction with wound contamination is the initial source.  Now with intraabdominal abscess as above.  TTE x 2 no vegetations but MRI brain now shows bilateral infarcts, consideration for possible septic emboli.  Ophthalmology evaluation negative for endophthalmitis.  Consider endovascular source of infection versus pelvic abscess.  SATURNINO unable to be performed as could not pass through the esophagus.  CTA without evidence of mycotic aneurysm.              -Continue IV fluconazole 800mg daily               -Follow up blood cultures 4/12              -Follow up repeat susceptibility testing              -Recommend repeat TTE next week to assess for valvular changes since SATURNINO cannot be performed              -Check repeat blood cultures 4/14              -If repeat blood cultures continue to be positive, recommend  repeat CT A/P to assess for source control/additional collections and will consider dual antifungal therapy although there is limited data for benefit of this    Acute hypoxic respiratory failure.  Multifactorial, with both COVID and bacterial pneumonia contributing.  Also consider persistent inflammation, fibrosis as seems quite steroid responsive in past.  Was on high dose steroids nearly 3 months, now weaned off.  No active concern for pneumonia at this time.  Patient weaned on pressure support today.     ELIESER.    Likely multifactorial due to prerenal status, medications.  With worsening BUN and creatinine.  Concern GI bleed make a contributing to high BUN.  Would also consider if this is attributing to worsening mental status.  Status post CRRT, now off with improvement in renal function. HD catheter removed due to persistent fungemia              -Follow creatinine closely and dose-adjust antibiotics as indicated   -Recheck BMP in AM to monitor renal function              -Nephrology following     GI bleed, ulcerations.    Status post EGD which showed erosive gastritis, esophageal tear with oozing, blood and extensive clot in the esophagus and stomach, small ulcers and trauma throughout the stomach.  Status post repeat EGD 4/4.  GMS, CMV/HSV stains negative on pathology.              -Check serial Hgb to monitor bleeding  -PRBCs per [facility]  -Continue PPI     Persistent encephalopathy.   Worsened 3/30. MRI brain shows multiple small bilateral foci of acute infarcts as described suggesting embolic etiology, partial lack of sulcal CSF suppression in the FLAIR sequence noted in the supratentorial and infratentorial brain. This could be artifactual/technique related; however, inflammatory or infectious process is not excluded. Patient opens her eyes and tracks.  Status post lumbar puncture 4/10, CSF studies not consistent with meningitis.              -Follow mental status closely  -Neurology following               -Management of infections as above     Elevated alk phos/T. bili.    May be related to fluconazole although would expect more elevation in AST/ALT.  Right upper quadrant ultrasound no significant abnormalities.  Improving   -Recheck CMP in AM to monitor LFTs     Recent recurrent bacterial pneumonia.    The patient has completed multiple treatment courses over the hospitalization. Prior BAL cultures with Pseudomonas and stenotrophomonas.  Unclear if pathogens or colonizers.  Repeat CT chest 4/5 with improving but persistent groundglass and consolidative opacities.     Recent severe COVID, present on admission.    Status post steroids, antivirals.  Rapid COVID antigen negative 3/25 and 3/27     Recent cecal volvulus  Noted on abdomen/pelvis CT 2/20.  Patient is status post exploratory laparotomy with ileocecectomy and end ileostomy creation 2/20.   End ileostomy is with ongoing ischemic changes which is likely contributing to the imaging findings and ongoing SIRS response.  Now with wound dehiscence status post staple removal, status post wound VAC placement 3/13, removed but replaced due to bleeding.  Now with retraction of ileostomy and fecal contamination through midline wound.  Status post washout 4/4.  Repeat CT now shows small volume hemoperitoneum in the pelvis, small anterior abdominal wall hematoma.  Status post IR drain placement with drainage of pus.  Culture shows heavy growth of Candida albicans     B-cell lymphoma  On maintenance rituxan, which is currently postponed.  There is now a hypovascular mass of the thyroid gland enlarging on serial imaging, concern for lymphoma.  IR consulted for biopsy.    Antibiotics:  Fluconazole  Zosyn    Subjective/24 Hour Events:  Patient seen on AM rounds.  Unable to provide ROS.  BRB from ostomy.  Getting PRBCs.  Remains ventilated.    Objective:  Vitals:  Temp:  [98.4 °F (36.9 °C)-100.2 °F (37.9 °C)] 99.3 °F (37.4 °C)  HR:  [116-127] 124  Resp:  [18-29] 25  BP:  ()/(54-68) 96/57  SpO2:  [98 %-100 %] 100 %  Temp (24hrs), Av.5 °F (37.5 °C), Min:98.4 °F (36.9 °C), Max:100.2 °F (37.9 °C)  Current: Temperature: 99.3 °F (37.4 °C)    Physical Exam:   General:  No acute distress  Psychiatric:  Awake but unresponsive  Pulmonary:  Normal respiratory excursion without accessory muscle use  Abdomen:  Ostomy with frankly bloody liquid stool  Extremities:  No edema  Skin:  No rashes    Lab Results:  I have personally reviewed pertinent labs.  Results from last 7 days   Lab Units 24  0554 24  1656 24  0521 24  0502   SODIUM mmol/L 150* 149* 147 144   POTASSIUM mmol/L 3.5 3.7 3.1* 3.7   CHLORIDE mmol/L 118* 116* 111* 111*   CO2 mmol/L 17* 18* 18* 19*   BUN mg/dL 70* 66* 65* 65*   CREATININE mg/dL 1.36* 1.27 1.26 1.19   EGFR ml/min/1.73sq m 42 46 47 50   CALCIUM mg/dL 9.9 10.0 10.2 9.9   AST U/L 16  --  22 21   ALT U/L 31  --  39 38   ALK PHOS U/L 743*  --  845* 786*     Results from last 7 days   Lab Units 24  0554 24  2302 24  1812   WBC Thousand/uL 14.75* 14.55* 14.95*   HEMOGLOBIN g/dL 6.7* 7.2* 7.5*   PLATELETS Thousands/uL 69* 62* 62*     Results from last 7 days   Lab Units 24  0521 04/10/24  2032 04/10/24  0542 24  0631 24  0447   BLOOD CULTURE  No Growth at 24 hrs.  No Growth at 24 hrs.  --  Candida albicans*  Candida albicans* Candida albicans* Candida albicans*   GRAM STAIN RESULT   --  No polys seen*  1+ Yeast* Budding yeast*  Budding yeast* Budding Yeast with Pseudomycelia* Budding Yeast with Pseudomycelia*   BODY FLUID CULTURE, STERILE   --  3+ Growth of Candida albicans*  --   --   --        Imaging Studies:   I have personally reviewed pertinent imaging study reports and images in PACS.  CTA head images reviewed small hemorrhagic transformation of prior infarct.    EKG, Pathology, and Other Studies:   I have personally reviewed pertinent reports.

## 2024-04-14 LAB
ABO GROUP BLD BPU: NORMAL
ABO GROUP BLD BPU: NORMAL
ALBUMIN SERPL BCP-MCNC: 2.5 G/DL (ref 3.5–5)
ALP SERPL-CCNC: 307 U/L (ref 34–104)
ALT SERPL W P-5'-P-CCNC: 25 U/L (ref 7–52)
AMMONIA PLAS-SCNC: 69 UMOL/L (ref 18–72)
ANION GAP SERPL CALCULATED.3IONS-SCNC: 16 MMOL/L (ref 4–13)
ANION GAP SERPL CALCULATED.3IONS-SCNC: 16 MMOL/L (ref 4–13)
ANISOCYTOSIS BLD QL SMEAR: PRESENT
ANISOCYTOSIS BLD QL SMEAR: PRESENT
AST SERPL W P-5'-P-CCNC: 14 U/L (ref 13–39)
BACTERIA BLD CULT: ABNORMAL
BASOPHILS # BLD MANUAL: 0 THOUSAND/UL (ref 0–0.1)
BASOPHILS # BLD MANUAL: 0 THOUSAND/UL (ref 0–0.1)
BASOPHILS NFR MAR MANUAL: 0 % (ref 0–1)
BASOPHILS NFR MAR MANUAL: 0 % (ref 0–1)
BILIRUB SERPL-MCNC: 1.48 MG/DL (ref 0.2–1)
BPU ID: NORMAL
BPU ID: NORMAL
BUN SERPL-MCNC: 73 MG/DL (ref 5–25)
BUN SERPL-MCNC: 76 MG/DL (ref 5–25)
BURR CELLS BLD QL SMEAR: PRESENT
CA-I BLD-SCNC: 1.39 MMOL/L (ref 1.12–1.32)
CALCIUM ALBUM COR SERPL-MCNC: 11.3 MG/DL (ref 8.3–10.1)
CALCIUM SERPL-MCNC: 10.1 MG/DL (ref 8.4–10.2)
CALCIUM SERPL-MCNC: 9.7 MG/DL (ref 8.4–10.2)
CHLORIDE SERPL-SCNC: 116 MMOL/L (ref 96–108)
CHLORIDE SERPL-SCNC: 119 MMOL/L (ref 96–108)
CO2 SERPL-SCNC: 15 MMOL/L (ref 21–32)
CO2 SERPL-SCNC: 18 MMOL/L (ref 21–32)
CREAT SERPL-MCNC: 1.25 MG/DL (ref 0.6–1.3)
CREAT SERPL-MCNC: 1.31 MG/DL (ref 0.6–1.3)
CROSSMATCH: NORMAL
EOSINOPHIL # BLD MANUAL: 0 THOUSAND/UL (ref 0–0.4)
EOSINOPHIL # BLD MANUAL: 0 THOUSAND/UL (ref 0–0.4)
EOSINOPHIL NFR BLD MANUAL: 0 % (ref 0–6)
EOSINOPHIL NFR BLD MANUAL: 0 % (ref 0–6)
ERYTHROCYTE [DISTWIDTH] IN BLOOD BY AUTOMATED COUNT: 16.9 % (ref 11.6–15.1)
ERYTHROCYTE [DISTWIDTH] IN BLOOD BY AUTOMATED COUNT: 17.2 % (ref 11.6–15.1)
GFR SERPL CREATININE-BSD FRML MDRD: 44 ML/MIN/1.73SQ M
GFR SERPL CREATININE-BSD FRML MDRD: 47 ML/MIN/1.73SQ M
GLUCOSE SERPL-MCNC: 127 MG/DL (ref 65–140)
GLUCOSE SERPL-MCNC: 129 MG/DL (ref 65–140)
GLUCOSE SERPL-MCNC: 131 MG/DL (ref 65–140)
GLUCOSE SERPL-MCNC: 165 MG/DL (ref 65–140)
GLUCOSE SERPL-MCNC: 229 MG/DL (ref 65–140)
GRAM STN SPEC: ABNORMAL
HCT VFR BLD AUTO: 17.9 % (ref 34.8–46.1)
HCT VFR BLD AUTO: 24.6 % (ref 34.8–46.1)
HCT VFR BLD AUTO: 27.2 % (ref 34.8–46.1)
HGB BLD-MCNC: 6 G/DL (ref 11.5–15.4)
HGB BLD-MCNC: 8.5 G/DL (ref 11.5–15.4)
HGB BLD-MCNC: 9.4 G/DL (ref 11.5–15.4)
LYMPHOCYTES # BLD AUTO: 0.12 THOUSAND/UL (ref 0.6–4.47)
LYMPHOCYTES # BLD AUTO: 0.33 THOUSAND/UL (ref 0.6–4.47)
LYMPHOCYTES # BLD AUTO: 1 % (ref 14–44)
LYMPHOCYTES # BLD AUTO: 2 % (ref 14–44)
MAGNESIUM SERPL-MCNC: 1.9 MG/DL (ref 1.9–2.7)
MCH RBC QN AUTO: 28.6 PG (ref 26.8–34.3)
MCH RBC QN AUTO: 30.2 PG (ref 26.8–34.3)
MCHC RBC AUTO-ENTMCNC: 33.5 G/DL (ref 31.4–37.4)
MCHC RBC AUTO-ENTMCNC: 34.6 G/DL (ref 31.4–37.4)
MCV RBC AUTO: 83 FL (ref 82–98)
MCV RBC AUTO: 90 FL (ref 82–98)
METAMYELOCYTES NFR BLD MANUAL: 2 % (ref 0–1)
MICROCYTES BLD QL AUTO: PRESENT
MONOCYTES # BLD AUTO: 0.12 THOUSAND/UL (ref 0–1.22)
MONOCYTES # BLD AUTO: 0.33 THOUSAND/UL (ref 0–1.22)
MONOCYTES NFR BLD: 1 % (ref 4–12)
MONOCYTES NFR BLD: 2 % (ref 4–12)
MYELOCYTES NFR BLD MANUAL: 4 % (ref 0–1)
MYELOCYTES NFR BLD MANUAL: 4 % (ref 0–1)
NEUTROPHILS # BLD MANUAL: 11.43 THOUSAND/UL (ref 1.85–7.62)
NEUTROPHILS # BLD MANUAL: 15.12 THOUSAND/UL (ref 1.85–7.62)
NEUTS BAND NFR BLD MANUAL: 36 % (ref 0–8)
NEUTS BAND NFR BLD MANUAL: 46 % (ref 0–8)
NEUTS SEG NFR BLD AUTO: 46 % (ref 43–75)
NEUTS SEG NFR BLD AUTO: 56 % (ref 43–75)
NRBC BLD AUTO-RTO: 11 /100 WBC (ref 0–2)
PHOSPHATE SERPL-MCNC: 5.9 MG/DL (ref 2.7–4.5)
PLATELET # BLD AUTO: 68 THOUSANDS/UL (ref 149–390)
PLATELET # BLD AUTO: 77 THOUSANDS/UL (ref 149–390)
PLATELET BLD QL SMEAR: ABNORMAL
PLATELET BLD QL SMEAR: ABNORMAL
PMV BLD AUTO: 12.5 FL (ref 8.9–12.7)
POIKILOCYTOSIS BLD QL SMEAR: PRESENT
POIKILOCYTOSIS BLD QL SMEAR: PRESENT
POLYCHROMASIA BLD QL SMEAR: PRESENT
POLYCHROMASIA BLD QL SMEAR: PRESENT
POTASSIUM SERPL-SCNC: 3.2 MMOL/L (ref 3.5–5.3)
POTASSIUM SERPL-SCNC: 3.8 MMOL/L (ref 3.5–5.3)
PROT SERPL-MCNC: 3.8 G/DL (ref 6.4–8.4)
RBC # BLD AUTO: 1.99 MILLION/UL (ref 3.81–5.12)
RBC # BLD AUTO: 2.97 MILLION/UL (ref 3.81–5.12)
RBC MORPH BLD: PRESENT
RBC MORPH BLD: PRESENT
SCHISTOCYTES BLD QL SMEAR: PRESENT
SODIUM SERPL-SCNC: 147 MMOL/L (ref 135–147)
SODIUM SERPL-SCNC: 153 MMOL/L (ref 135–147)
UNIT DISPENSE STATUS: NORMAL
UNIT DISPENSE STATUS: NORMAL
UNIT PRODUCT CODE: NORMAL
UNIT PRODUCT CODE: NORMAL
UNIT PRODUCT VOLUME: 234 ML
UNIT PRODUCT VOLUME: 350 ML
UNIT RH: NORMAL
UNIT RH: NORMAL
WBC # BLD AUTO: 12.42 THOUSAND/UL (ref 4.31–10.16)
WBC # BLD AUTO: 16.43 THOUSAND/UL (ref 4.31–10.16)

## 2024-04-14 PROCEDURE — 82948 REAGENT STRIP/BLOOD GLUCOSE: CPT

## 2024-04-14 PROCEDURE — C9113 INJ PANTOPRAZOLE SODIUM, VIA: HCPCS | Performed by: EMERGENCY MEDICINE

## 2024-04-14 PROCEDURE — 99233 SBSQ HOSP IP/OBS HIGH 50: CPT | Performed by: INTERNAL MEDICINE

## 2024-04-14 PROCEDURE — C9254 INJECTION, LACOSAMIDE: HCPCS | Performed by: STUDENT IN AN ORGANIZED HEALTH CARE EDUCATION/TRAINING PROGRAM

## 2024-04-14 PROCEDURE — 43255 EGD CONTROL BLEEDING ANY: CPT | Performed by: INTERNAL MEDICINE

## 2024-04-14 PROCEDURE — 85014 HEMATOCRIT: CPT | Performed by: EMERGENCY MEDICINE

## 2024-04-14 PROCEDURE — 99291 CRITICAL CARE FIRST HOUR: CPT | Performed by: INTERNAL MEDICINE

## 2024-04-14 PROCEDURE — 85007 BL SMEAR W/DIFF WBC COUNT: CPT

## 2024-04-14 PROCEDURE — 85027 COMPLETE CBC AUTOMATED: CPT

## 2024-04-14 PROCEDURE — 80048 BASIC METABOLIC PNL TOTAL CA: CPT

## 2024-04-14 PROCEDURE — 82330 ASSAY OF CALCIUM: CPT

## 2024-04-14 PROCEDURE — 94003 VENT MGMT INPAT SUBQ DAY: CPT

## 2024-04-14 PROCEDURE — 94760 N-INVAS EAR/PLS OXIMETRY 1: CPT

## 2024-04-14 PROCEDURE — 94640 AIRWAY INHALATION TREATMENT: CPT

## 2024-04-14 PROCEDURE — 0W3P8ZZ CONTROL BLEEDING IN GASTROINTESTINAL TRACT, VIA NATURAL OR ARTIFICIAL OPENING ENDOSCOPIC: ICD-10-PCS | Performed by: INTERNAL MEDICINE

## 2024-04-14 PROCEDURE — 0DC68ZZ EXTIRPATION OF MATTER FROM STOMACH, VIA NATURAL OR ARTIFICIAL OPENING ENDOSCOPIC: ICD-10-PCS | Performed by: INTERNAL MEDICINE

## 2024-04-14 PROCEDURE — 85018 HEMOGLOBIN: CPT | Performed by: EMERGENCY MEDICINE

## 2024-04-14 PROCEDURE — 99024 POSTOP FOLLOW-UP VISIT: CPT | Performed by: SURGERY

## 2024-04-14 PROCEDURE — P9040 RBC LEUKOREDUCED IRRADIATED: HCPCS

## 2024-04-14 PROCEDURE — 80053 COMPREHEN METABOLIC PANEL: CPT

## 2024-04-14 PROCEDURE — 3E0G8GC INTRODUCTION OF OTHER THERAPEUTIC SUBSTANCE INTO UPPER GI, VIA NATURAL OR ARTIFICIAL OPENING ENDOSCOPIC: ICD-10-PCS | Performed by: INTERNAL MEDICINE

## 2024-04-14 PROCEDURE — 87040 BLOOD CULTURE FOR BACTERIA: CPT | Performed by: STUDENT IN AN ORGANIZED HEALTH CARE EDUCATION/TRAINING PROGRAM

## 2024-04-14 PROCEDURE — P9037 PLATE PHERES LEUKOREDU IRRAD: HCPCS

## 2024-04-14 PROCEDURE — 83735 ASSAY OF MAGNESIUM: CPT

## 2024-04-14 PROCEDURE — 84100 ASSAY OF PHOSPHORUS: CPT

## 2024-04-14 PROCEDURE — 82140 ASSAY OF AMMONIA: CPT

## 2024-04-14 RX ORDER — FENTANYL CITRATE 50 UG/ML
100 INJECTION, SOLUTION INTRAMUSCULAR; INTRAVENOUS ONCE
Status: COMPLETED | OUTPATIENT
Start: 2024-04-14 | End: 2024-04-14

## 2024-04-14 RX ORDER — LACTULOSE 10 G/15ML
20 SOLUTION ORAL 2 TIMES DAILY
Status: DISCONTINUED | OUTPATIENT
Start: 2024-04-15 | End: 2024-04-18

## 2024-04-14 RX ORDER — EPINEPHRINE 0.1 MG/ML
INJECTION INTRAVENOUS AS NEEDED
Status: COMPLETED | OUTPATIENT
Start: 2024-04-14 | End: 2024-04-14

## 2024-04-14 RX ORDER — EPINEPHRINE 0.1 MG/ML
INJECTION INTRAVENOUS
Status: DISPENSED
Start: 2024-04-14 | End: 2024-04-15

## 2024-04-14 RX ORDER — POTASSIUM CHLORIDE 14.9 MG/ML
20 INJECTION INTRAVENOUS
Qty: 200 ML | Refills: 0 | Status: COMPLETED | OUTPATIENT
Start: 2024-04-14 | End: 2024-04-14

## 2024-04-14 RX ORDER — MIDAZOLAM HYDROCHLORIDE 2 MG/2ML
6 INJECTION, SOLUTION INTRAMUSCULAR; INTRAVENOUS ONCE
Status: COMPLETED | OUTPATIENT
Start: 2024-04-14 | End: 2024-04-14

## 2024-04-14 RX ORDER — POTASSIUM CHLORIDE 20MEQ/15ML
40 LIQUID (ML) ORAL 2 TIMES DAILY
Status: DISCONTINUED | OUTPATIENT
Start: 2024-04-14 | End: 2024-04-14

## 2024-04-14 RX ORDER — MIDAZOLAM HYDROCHLORIDE 2 MG/2ML
4 INJECTION, SOLUTION INTRAMUSCULAR; INTRAVENOUS ONCE
Status: COMPLETED | OUTPATIENT
Start: 2024-04-14 | End: 2024-04-14

## 2024-04-14 RX ORDER — METOCLOPRAMIDE HYDROCHLORIDE 5 MG/ML
10 INJECTION INTRAMUSCULAR; INTRAVENOUS EVERY 6 HOURS SCHEDULED
Status: COMPLETED | OUTPATIENT
Start: 2024-04-14 | End: 2024-04-14

## 2024-04-14 RX ORDER — DEXTROSE MONOHYDRATE 50 MG/ML
50 INJECTION, SOLUTION INTRAVENOUS CONTINUOUS
Status: DISCONTINUED | OUTPATIENT
Start: 2024-04-14 | End: 2024-04-15

## 2024-04-14 RX ADMIN — PIPERACILLIN SODIUM AND TAZOBACTAM SODIUM 4.5 G: 36; 4.5 INJECTION, POWDER, LYOPHILIZED, FOR SOLUTION INTRAVENOUS at 08:06

## 2024-04-14 RX ADMIN — MIDAZOLAM 2 MG: 1 INJECTION INTRAMUSCULAR; INTRAVENOUS at 15:45

## 2024-04-14 RX ADMIN — MICAFUNGIN SODIUM 100 MG: 50 INJECTION, POWDER, LYOPHILIZED, FOR SOLUTION INTRAVENOUS at 11:11

## 2024-04-14 RX ADMIN — Medication 800 MG: at 08:56

## 2024-04-14 RX ADMIN — CHLORHEXIDINE GLUCONATE 0.12% ORAL RINSE 15 ML: 1.2 LIQUID ORAL at 20:58

## 2024-04-14 RX ADMIN — FENTANYL CITRATE 50 MCG: 50 INJECTION INTRAMUSCULAR; INTRAVENOUS at 15:03

## 2024-04-14 RX ADMIN — Medication 2 SPRAY: at 21:18

## 2024-04-14 RX ADMIN — PIPERACILLIN SODIUM AND TAZOBACTAM SODIUM 4.5 G: 36; 4.5 INJECTION, POWDER, LYOPHILIZED, FOR SOLUTION INTRAVENOUS at 16:25

## 2024-04-14 RX ADMIN — NYSTATIN: 100000 POWDER TOPICAL at 09:51

## 2024-04-14 RX ADMIN — DEXTROSE 50 ML/HR: 5 SOLUTION INTRAVENOUS at 07:35

## 2024-04-14 RX ADMIN — METOCLOPRAMIDE HYDROCHLORIDE 10 MG: 5 INJECTION INTRAMUSCULAR; INTRAVENOUS at 12:29

## 2024-04-14 RX ADMIN — METOCLOPRAMIDE HYDROCHLORIDE 10 MG: 5 INJECTION INTRAMUSCULAR; INTRAVENOUS at 07:51

## 2024-04-14 RX ADMIN — CHLORHEXIDINE GLUCONATE 0.12% ORAL RINSE 15 ML: 1.2 LIQUID ORAL at 08:01

## 2024-04-14 RX ADMIN — PANTOPRAZOLE SODIUM 40 MG: 40 INJECTION, POWDER, FOR SOLUTION INTRAVENOUS at 08:01

## 2024-04-14 RX ADMIN — POTASSIUM CHLORIDE 20 MEQ: 14.9 INJECTION, SOLUTION INTRAVENOUS at 10:04

## 2024-04-14 RX ADMIN — LACOSAMIDE 100 MG: 10 INJECTION, SOLUTION INTRAVENOUS at 20:58

## 2024-04-14 RX ADMIN — SODIUM CHLORIDE SOLN NEBU 3% 4 ML: 3 NEBU SOLN at 07:39

## 2024-04-14 RX ADMIN — POTASSIUM CHLORIDE 20 MEQ: 14.9 INJECTION, SOLUTION INTRAVENOUS at 07:51

## 2024-04-14 RX ADMIN — EPINEPHRINE 0.2 MG: 0.1 INJECTION INTRAVENOUS at 16:00

## 2024-04-14 RX ADMIN — PIPERACILLIN SODIUM AND TAZOBACTAM SODIUM 4.5 G: 36; 4.5 INJECTION, POWDER, LYOPHILIZED, FOR SOLUTION INTRAVENOUS at 01:05

## 2024-04-14 RX ADMIN — METOCLOPRAMIDE HYDROCHLORIDE 10 MG: 5 INJECTION INTRAMUSCULAR; INTRAVENOUS at 18:04

## 2024-04-14 RX ADMIN — FENTANYL CITRATE 50 MCG: 50 INJECTION INTRAMUSCULAR; INTRAVENOUS at 16:01

## 2024-04-14 RX ADMIN — SODIUM CHLORIDE SOLN NEBU 3% 4 ML: 3 NEBU SOLN at 19:18

## 2024-04-14 RX ADMIN — INSULIN LISPRO 1 UNITS: 100 INJECTION, SOLUTION INTRAVENOUS; SUBCUTANEOUS at 00:58

## 2024-04-14 RX ADMIN — NYSTATIN: 100000 POWDER TOPICAL at 21:18

## 2024-04-14 RX ADMIN — Medication 2 SPRAY: at 08:06

## 2024-04-14 RX ADMIN — PANTOPRAZOLE SODIUM 40 MG: 40 INJECTION, POWDER, FOR SOLUTION INTRAVENOUS at 20:58

## 2024-04-14 RX ADMIN — FENTANYL CITRATE 100 MCG: 50 INJECTION INTRAMUSCULAR; INTRAVENOUS at 14:18

## 2024-04-14 RX ADMIN — INSULIN LISPRO 1 UNITS: 100 INJECTION, SOLUTION INTRAVENOUS; SUBCUTANEOUS at 19:17

## 2024-04-14 RX ADMIN — LACOSAMIDE 100 MG: 10 INJECTION, SOLUTION INTRAVENOUS at 08:56

## 2024-04-14 RX ADMIN — MIDAZOLAM 2 MG: 1 INJECTION INTRAMUSCULAR; INTRAVENOUS at 13:45

## 2024-04-14 NOTE — PLAN OF CARE
Problem: SAFETY ADULT  Goal: Patient will remain free of falls  Description: INTERVENTIONS:  - Educate patient/family on patient safety including physical limitations  - Instruct patient to call for assistance with activity   - Consult OT/PT to assist with strengthening/mobility   - Keep Call bell within reach  - Keep bed low and locked with side rails adjusted as appropriate  - Keep care items and personal belongings within reach  - Initiate and maintain comfort rounds  - Make Fall Risk Sign visible to staff  - Offer Toileting every 2 Hours, in advance of need  - Initiate/Maintain bed alarm  - Obtain necessary fall risk management equipment: non skid footwear  - Apply yellow socks and bracelet for high fall risk patients  - Consider moving patient to room near nurses station  Outcome: Progressing     Problem: RESPIRATORY - ADULT  Goal: Achieves optimal ventilation and oxygenation  Description: INTERVENTIONS:  - Assess for changes in respiratory status  - Assess for changes in mentation and behavior  - Position to facilitate oxygenation and minimize respiratory effort  - Oxygen administered by appropriate delivery if ordered  - Initiate smoking cessation education as indicated  - Encourage broncho-pulmonary hygiene including cough, deep breathe, Incentive Spirometry  - Assess the need for suctioning and aspirate as needed  - Assess and instruct to report SOB or any respiratory difficulty  - Respiratory Therapy support as indicated  Outcome: Progressing     Problem: SKIN/TISSUE INTEGRITY - ADULT  Goal: Skin Integrity remains intact(Skin Breakdown Prevention)  Description: Assess:  -Perform Flavio assessment every shift   -Clean and moisturize skin every day   -Inspect skin when repositioning, toileting, and assisting with ADLS  -Assess under medical devices such as ronny  every hour   -Assess extremities for adequate circulation and sensation     Bed Management:  -Have minimal linens on bed & keep smooth,  unwrinkled  -Change linens as needed when moist or perspiring  -Avoid sitting or lying in one position for more than 2 hours while in bed  -Keep HOB at 45 degrees     Toileting:  -Offer bedside commode  -Assess for incontinence every hour  -Use incontinent care products after each incontinent episode such as moisture barrier cream     Activity:  -Mobilize patient 3 times a day  -Encourage activity and walks on unit  -Encourage or provide ROM exercises   -Turn and reposition patient every 2 Hours  -Use appropriate equipment to lift or move patient in bed  -Instruct/ Assist with weight shifting every hour when out of bed in chair  -Consider limitation of chair time 2 hour intervals    Skin Care:  -Avoid use of baby powder, tape, friction and shearing, hot water or constrictive clothing  -Relieve pressure over bony prominences using allevyn   -Do not massage red bony areas    Next Steps:  -Teach patient strategies to minimize risks such as weight shifting    -Consider consults to  interdisciplinary teams such as PT  Outcome: Progressing     Problem: Potential for Falls  Goal: Patient will remain free of falls  Description: INTERVENTIONS:  - Educate patient/family on patient safety including physical limitations  - Instruct patient to call for assistance with activity   - Consult OT/PT to assist with strengthening/mobility   - Keep Call bell within reach  - Keep bed low and locked with side rails adjusted as appropriate  - Keep care items and personal belongings within reach  - Initiate and maintain comfort rounds  - Make Fall Risk Sign visible to staff  - Offer Toileting every 2 Hours, in advance of need  - Initiate/Maintain bed alarm  - Obtain necessary fall risk management equipment: non skid footwear  - Apply yellow socks and bracelet for high fall risk patients  - Consider moving patient to room near nurses station  Outcome: Progressing

## 2024-04-14 NOTE — PROGRESS NOTES
Progress Note - Infectious Disease   Carla Hunt 59 y.o. female MRN: [id number]  [facility]: [id number]      Impression/Recommendations:  Sepsis, recurrent since admission.    Due to COVID-19 infection, recurrent pneumonias, cecal volvulus status post surgery and wound dehiscence.  Most recently due to with persistent candidemia, intraabdominal abscess.  Remains critically ill              -Continue antibiotics, antifungals as below  -Follow temperatures closely  -Recheck CBC diff in AM to monitor infection  -Supportive care as per the primary service  -Follow-up pending cultures, drain culture     Intra-abdominal abscess.    In setting of prior abdominal surgery, ileostomy as below.  Status post IR drain placement to pelvic collection 4/10 which yielded pus.  Culture shows heavy growth of Candida albicans so likely a source of persistent fungemia.                -Continue IV fluconazole   -Continue IV Zosyn              -Follow drain output closely     Persistent C. Albicans candidemia.    Initial positive cultures 3/31 are growing Candida albicans, susceptible to fluconazole.  Suspect ileostomy retraction with wound contamination is the initial source.  Now with intraabdominal abscess as above.  TTE x 2 no vegetations but MRI brain now shows bilateral infarcts, consideration for possible septic emboli.  Ophthalmology evaluation negative for endophthalmitis.  Consider endovascular source of infection versus pelvic abscess.  SATURNINO unable to be performed as could not pass through the esophagus.  CTA without evidence of mycotic aneurysm.              -Continue IV fluconazole 800mg daily               -Follow up blood cultures              -Follow up repeat susceptibility testing              -Check repeat TTE next week to assess for valvular changes since SATURNINO cannot be performed              -If repeat blood cultures continue to be positive, recommend repeat CT A/P to assess for source  control/additional collections and will consider dual antifungal therapy although there is limited data for benefit of this     Acute hypoxic respiratory failure.  Status post trach.  Multifactorial, with both COVID and bacterial pneumonia contributing.  Also consider persistent inflammation, fibrosis as seems quite steroid responsive in past.  Was on high dose steroids nearly 3 months, now weaned off.  No active concern for pneumonia at this time.  Weaned to PSV.     Recurrent GI bleed, ulcerations.    In setting of gastritis, esophagitis, esophageal ulceration.  GMS, CMV/HSV stains negative on pathology.              -Check serial Hgb to monitor bleeding  -Continue PPI  -Repeat EGD pending per GI     Persistent encephalopathy.   Worsened 3/30. MRI brain shows multiple small bilateral foci of acute infarcts as described suggesting embolic etiology.  Status post lumbar puncture 4/10, CSF studies not consistent with meningitis.              -Follow mental status closely  -Neurology following              -Management of infections as above     Elevated alk phos/T. bili.    May be related to fluconazole although would expect more elevation in AST/ALT.  Right upper quadrant ultrasound no significant abnormalities.  Improving              -Recheck CMP in AM to monitor LFTs     Recent recurrent bacterial pneumonia.    The patient has completed multiple treatment courses over the hospitalization. Prior BAL cultures with Pseudomonas and stenotrophomonas.  Unclear if pathogens or colonizers.  Repeat CT chest 4/5 with improving but persistent groundglass and consolidative opacities.     Recent severe COVID, present on admission.    Status post steroids, antivirals.  Rapid COVID antigen negative 3/25 and 3/27     Recent cecal volvulus  Noted on abdomen/pelvis CT 2/20.  Patient is status post exploratory laparotomy with ileocecectomy and end ileostomy creation 2/20.   End ileostomy is with ongoing ischemic changes, retraction  of ileostomy and fecal contamination through midline wound.  Status post washout .  Repeat CT now shows small volume hemoperitoneum in the pelvis, small anterior abdominal wall hematoma.  Status post IR drain placement with drainage of pus.  Culture shows heavy growth of Candida albicans     B-cell lymphoma  On maintenance rituxan, which is currently postponed.  There is now a hypovascular mass of the thyroid gland enlarging on serial imaging, concern for lymphoma.  IR consulted for biopsy.     I discussed with Dr. Mays the above plan to continue current antibiotics, antifungals.  He agrees with the plan.    Antibiotics:  Fluconazole #12  Zosyn #12    Subjective/24 Hour Events:  Patient seen on AM rounds.  Remains unresponsive.  Went to EGD yesterday which showed bleeding esophageal ulcer, esophagitis, gastritis.    Objective:  Vitals:  Temp:  [95 °F (35 °C)-98.7 °F (37.1 °C)] 98.2 °F (36.8 °C)  HR:  [100-141] 118  Resp:  [18-34] 26  BP: (100-170)/(53-79) 134/68  SpO2:  [99 %-100 %] 99 %  Temp (24hrs), Av.9 °F (36.1 °C), Min:95 °F (35 °C), Max:98.7 °F (37.1 °C)  Current: Temperature: 98.2 °F (36.8 °C)    Physical Exam:   General:  No acute distress  Psychiatric:  Awake but unresponsive  Pulmonary:  Normal respiratory excursion without accessory muscle use  Abdomen:  Soft, nontender  Extremities:  No edema  Skin:  No rashes    Lab Results:  I have personally reviewed pertinent labs.  Results from last 7 days   Lab Units 24  0521 24  0554 24  1656 24  0521   SODIUM mmol/L 153* 150* 149* 147   POTASSIUM mmol/L 3.2* 3.5 3.7 3.1*   CHLORIDE mmol/L 119* 118* 116* 111*   CO2 mmol/L 18* 17* 18* 18*   BUN mg/dL 76* 70* 66* 65*   CREATININE mg/dL 1.31* 1.36* 1.27 1.26   EGFR ml/min/1.73sq m 44 42 46 47   CALCIUM mg/dL 10.1 9.9 10.0 10.2   AST U/L 14 16  --  22   ALT U/L 25 31  --  39   ALK PHOS U/L 307* 743*  --  845*     Results from last 7 days   Lab Units 24  0521 24  1945  04/13/24  1246 04/13/24  0554   WBC Thousand/uL 12.42* 13.09*  --  14.75*   HEMOGLOBIN g/dL 6.0* 7.1* 7.6* 6.7*   PLATELETS Thousands/uL 77* 43*  --  69*     Results from last 7 days   Lab Units 04/12/24  0521 04/10/24  2032 04/10/24  0542 04/09/24  0631 04/09/24  0447   BLOOD CULTURE  No Growth at 48 hrs.  --  Candida albicans*  Candida albicans* Candida albicans* Candida albicans*   GRAM STAIN RESULT  Yeast* No polys seen*  1+ Yeast* Budding yeast*  Budding yeast* Budding Yeast with Pseudomycelia* Budding Yeast with Pseudomycelia*   BODY FLUID CULTURE, STERILE   --  3+ Growth of Candida albicans*  --   --   --        Imaging Studies:   I have personally reviewed pertinent imaging study reports and images in PACS.  CT A/P images reviewed active bleeding in stomach    EKG, Pathology, and Other Studies:   I have personally reviewed pertinent reports.

## 2024-04-14 NOTE — RESPIRATORY THERAPY NOTE
RT Ventilator Management Note  Carla Hunt 59 y.o. female MRN: [id number]  Unit/Bed#: [facility] Encounter: [id number]      Daily Screen         4/13/2024 1924 4/14/2024  0740          Patient safety screen outcome:: Failed Passed (P)                 Physical Exam:   Assessment Type: Assess only  General Appearance: Sleeping  Respiratory Pattern: Assisted  Chest Assessment: Chest expansion symmetrical  Bilateral Breath Sounds: Clear, Diminished  Suction: Trach  O2 Device: G5 vent      Resp Comments: (P) Pt found on P(cmv), switched to SPONT PS 8 PEEP 6 fio2 40%. Pt appears to be tolerating these settings well at this time. Will continue to monitor pt per resp protocol.

## 2024-04-14 NOTE — PROGRESS NOTES
[facility]  Progress Note: Critical Care  Name: Carla Hunt 59 y.o. female I MRN: [id number]  Unit/Bed#: MICU 10 I Date of Admission: 1/10/2024   Date of Service: 4/14/2024 I Hospital Day: 95    Assessment/Plan   Neuro:  #Alertness & analgesia  - OFF modafinil 100mg qd as of 4/8  - Delirium precautions  - Patient is awake and opening eyes but not moving purposefully or responding to commands, will hold on any sedating medications at this time  - Hold analgesia and sedation aside from periprocedural requirements     #Metabolic encephalopathy; POA  - Waxing and waning neuro exam since admission  - MRI brain on 4/6 showed findings suggestive of embolic etiology without significant edema, mass effect or hemorrhagic transformation (suspect septic emboli)  - Repeat MRI brain on 4/10 (obtained due to concern for acute decreased attenuation of the left hemisphere) confirms likely [septic] embolic strokes from prior scans + several new small areas of infarction + a small new hemorrhage in the left inferior parietal lobe.  - CTA H/N on 4/12 negative for evidence of mycotic aneurysms  - Electrolytes, sodium, hyperglycemia 2/2 high dose steroids requiring insulin gtt. TME may be prolonged 2/2 infectious burden, possibly worsened by uremic encephalopathy worsened by ELIESER now resolved  - Bcx growing fungemia, identified as candida albicans - most recent positive cultures on 4/12  - Cryptococcal ag negative  - Aspergillus galactomannan ag negative  - Tb quantiferon gold indeterminate 2/2 lymphopenia    --  Plan:  - Corticosteroids now weaned to off  - CRRT off with persistent uremia   - Follow up serial blood cultures - positive for yeast as of 4/12  - Plan for repeat as TTE as below  - Possible concomitant intraabdominal infection related to ostomy (see ID A/P) contributing to TME in setting of other comorbidities affecting mentation (embolic strokes, left inferior parietal IPH, uremia  (in setting of GI bleed - see below), prolonged critical illness with associated delirium)  - Appreciate Neurology input  - Ammonia elevated; start lactulose and titrate to 3-4 BM per day              - Can add rifaximin if needed  - Frequent neurochecks     #CVA due to multiple bilateral foci of acute infarcts   - MRI brain 4/6- multiple small bilateral foci of acute infarcts. No significant edema, mass effect, or hemorrhagic transformation -- suspicious for septic embolic phenomenon  - MRI brain on 4/10 showed known infarcts with several new small infarcts  - Not on AP/AC. Recent TTE 4/1 without vegetation   Plan:  - SATURNINO requested to assess for endocarditis in light of brain MRI concerning for septic emboli in setting of fungemia, but aborted due to difficult access to esophagus despite visual aid by bronchoscope              - Patient does have a PFO on bubble study with a DVT in the RLE which also contributes to stroke risk   - Avoid ASA due to small hemorraghic conversion of septic emboli  - Frequent neuro checks  - Repeat TTE per ID to reassess for vegetations     #Seizure disorder, known history  - S/p partial left temporal lobe resection in 2007 2/2 complex migraines  - On Lamictal 150 bid and Topamax 50mg bid at home  - Last lamotrigine level from 03/02 at 10.7 (therapeutic)  - Home Lamictal switched to Vimpat 3/27 due to worsening pancytopenia, neuro following  - Continue Vimpat 100mg bid maintenance dose  - Seizure precautions      #Anxiety, known history  -On Effexor 12.5 mg TID       CV:  #Sinus tachycardia  - TTE 3/17 with no major structural dysfunction  - Multifactorial 2/2 deconditioning, dehydration/hypvol, acute on chronic anemia, underlying infectious/inflammatory process, pain/agitation  - TTE 4/1 with EF 70%, no WMA, no changes from prior  - TTE with bubble study 4/7 showed LVEF 70% with right to left shunting related to PFO vs intrapulmonary shunt  - SATURNINO 4/11 as below  - Etiology likely  secondary to infectious processes (fungemia, intra-abdominal infection related to ostomy, pneumonia) which are being addressed              - No need for beta blockade after discussion with Cardiology       Pulm:  #Acute hypoxic respiratory failure;  #Bilateral ground glass opacities, improving  - Vent dependent; s/p trach 3/12 after was reintubated 3/11 due to increased WOB  - Complicated by recurrent bacterial PNA treated w 10d levaquin (completed 2/25) for MDR PNA; most recent BAL +recurrent stenotrophomona treated with Bactrim, switched to minocyline 14d course completed 3/28.  - Etiology likely multifactorial including possible COVID pneumonitis vs recurrent PNA vs hypersensitivity pneumonitis 2/2 ?rituxumab. Persistent inflammation likely given patient has been steroid responsive throughout admission.   - Repeat CXR 3/22 with significant improvement of multifocal PNA. 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 2/2 poor wound healing midline incision  - Poor wound healing likely due to high dose steroid therapy s/p wound vac that was removed 3/17 by gen surg now s/p s/p OR 4/5 with surgically created mucus fistula and end ileostomy  Plan:  - Wound vac as per general surgery  - ALP and GGT elevated; likely ADR of fluconazole              - RUQ without acute biliary abnormality  - CT abdomen/pelvis with  concern for purulent material in the peritoneum and collection adjacent to the ostomy (after discussion with Surgery; read from Radiology notes hemoperitoneum)              - S/p IR drainage of fluid at same time as LP              - Appreciate Surgery assistance with this              - Zosyn as below  - Patient with upper GI bleed on 4/13   - CT high volume scan revealed findings concerning for active bleeding in the stomach   - EGD on 4/13 found esophageal ulcer actively bleeding s/p 2 clips with epi injected for hemostasis, severe esophagitis and ulcerations. However could not properly visualize the stomach, so reglan was given and plan for second look EGD 4/14  - Lactulose as above; titrate to 3-4 BM per day (resume when parenteral feeding resumes)              - Can add rifaximin if needed              - Follow up NH3 down trending  - Monitor ostomy pouch output  - ID following  - General surgery following     #Oropharyngeal dysphagia   - Has had multiple and prolonged intubations now s/p trach   - VBS 3/27 oropharyngeal dysphagia  - Continue tube feeds for now until potential PEG placement pending abdominal wound healing as per Gen Surg     #Esophagitis   - Acute on chronic anemia associated bloody NG output on 4/3  - Uremic, worsening suggestive of UGIB given relatively stable Cr  - Bedside EGD x2 (4/2,4/3) Ulcerated mucosa in the upper third of the esophagus without evidence of hemorrhage   - Repeat EGD on 4/13 again shows severe ulcerations and severe esophagitis with bleeding ulcer (s/p clips x2 and epi injection)  - Plan for second look EGD 4/14  Plan:  HSV/CMV/candida from GI sample negative  Continue PPI IV bid  Reglan per GI  Monitor NGT output, stool output       :  #Uremia  - ?catabolic from steroids, may also have ?slow UGIB in setting of prolonged steroids though no overt s/s of GIB and H&H stable since 3/23 and patient is on ppi  - Trending up   - EGD without active source of bleeding but did  note ulcerations  - FEurea suggestive of intrinsic pathology   - GFR largely stable, although slowly down trending  - Steroids now off  - Persistent off CRRT likely in setting of upper GI bleed  - BMP daily, uremia currently stable              - Cr slowly trending up; may be falsely elevated due to prolonged course of Bactrim so will d/c and monitor Cr (d/c 4/14/24)  - NH3 elevated; start lactulose +/- rifaximin as above        F/E/N:  F: start d5w 50cc/hr 4/14 given Na 153 and off tube feeds pending further GI eval  E: monitor and replete for goal K>4, P>3, Mg>2  N: tube feeds with Nepro 45 cc/h, Prosource liquid protein to 1 packet, Refugio BID to support wound healing, 200 cc FWF q6h    - Currently HELD pending GI eval; resume once able from GI standpoint        Heme/Onc:  #Pancytopenia, improving with intermittent plt/prbc's transfusion, s/p Filgastrim x3  - In setting of hx of B cell lymphoma, prior chemo, Rituxan therapy, prior abx and present meds including lamictal  - Hemo onc consulted, empirically given Filgastrim 300mg qd x3d (completed 3/30); IR bone marrow bx deferred by heme-onc   - Lamictal switched to Vimpat in consultation with neuro as above due to potential contribution to pancytopenia  - Trend CBC and diff daily, neutropenic precautions for ANC <500     #Acute on chronic anemia  - Patient had significant blood loss from ostomy site 3/20, resulting in hemorraghic shock, improved with blood transfusion; hemostasis achieved after bleeding source identified and sutured. Another large vol danie bloody output from osotomy on 3/21, bleeding source within ostomy site, sutured.    - EGD 4/3 and 4/4 without active bleeding, however repeat large volume bleed from stomach and esophagus on 4/13 (see above) requiring 4u pRBC  - ?Worsened by impaired marrow response liekly 2/2 persistent inflammation due to low retic index ~1 prior to most recent transfusions; normocytic with normal B12/folate levels; MCV<100.  Iatrogenic cause likely contributing 2/2 frequent blood draws; chronic anemia likely persistent inflammatory state/CKD/lymphoma in setting of elevated ferritin of 974. SPEP/UPEP negative.  Plan:  - Routine monitoring ostomy output, if bleeding, apply direct pressure and contact gen surg STAT for hemostasis .  - Continue tube feeds/nutritional support  - Give FFP/vitamin K to correct INR as indicated  - Trend CBC, transfuse Transfuse with leukoreduced and irradiated RBCs to maintain Hgb>7     #Thrombocytopenia  - Likely multifactorial including imparied production from marrow dysfunction in setting of persistent inflammation; RI low suggestive of hyperproliferation, hx of B-cell lymphoma, recent infections including persistent COVID, PNA treated, CMV negative. No known history of vWD/congenital disorders. No liver dysfunction; recent hepatic profile unremarkable. HIT workup negative, Hemolytic workup negative. No s/s DIC. No mechanical valves. ?Primary ITP.  Plan:  - Filgrastim 300mg x3 to aid in plt recovery  - Transfuse with leukoreduced and irradiated PLTs if count <20k due to increased risk of bleeding   - ONLY transfuse Plt if <20k and actively bleeding  - DVT ppx held, start heparin subq 4/11  - See plan under acute on chronic anemia      # Lymphopenia with bandemia  - In setting of high dose steroids, now off  - Severely depressed immunoglobulins inclding IgG, IgA, IgM  - At risk for further infections  - Filgrastim 300mg x3 to aid in plt recovery  - Contact and airborne precautions     #B cell lymphoma  - [facility]  - S/P 6 cycles of chemotherapy in 20222  - Maintained on Rituxan for 2 year course PTA, started May 2022, last dose was 12/2024, treatment currently on hold in setting of persistent COVID-19 infection  Anti-Ritux Ab negative  Plan:  - Holding rituximab in setting of persistent COVID-19 infection, now resolved.  ?resume rituxubmab pending clinical stability & anti-ritux ab  status in consultation with heme-onc     DVT ppx: Lovenox     Endo:  #Steroid hyperglycemia and diabetes mellitus type 2, A1c at 6.6 from 01/2024  - Goal -180  - Utilize SSI no insulin gtt     #R thyroid gland enlargement  - Seen on CT 4/5  - Follow up US 4/5- Limited exam due to overlying tracheostomy tube and central line catheter. Heterogeneous lobular appearance to the remaining thyroid gland, nonspecific, question sequela of thyroiditis. No discrete nodule identified.   T4/TSH unremarkable   Plan: no longer planning thyroid FNA biopsy; likely bleed related to tracheostomy insertion  Will need follow up thyroid US sometime week of 4/15 to follow up     ID:  #Fungemia   BCx growing yeast - most recently on 4/8, ID panel- candida albicans  In setting of severe lymphopenia and severely depressed immunoglobulins   Septic emboli noted  Ophtho consulted, no evidence of ophthalmic endophthalmitis   Plan:   Escalate to micafungin per ID on 4/14 given persistent positive blood cultures despite attempted source control of abdominopelvic fluid collection  Will discuss with ID about further abdominal imaging given recent CT abdomen/pelvis on 4/13  Follow up LP: AFB, fungal culture, bacterial culture, crypto, cytology, ME panel, autoimmune panel pending  SATURNINO per Cardiology postponed  Avoid broad spectrum abx as it can contribute to fungal growth  Continue Zosyn as there is now concern for intraabdominal infection related to ostomy  HD cath out  F/U Repeat serial Bcx (q48h) to ensure clearance    ID following; all Abx will need dose adjustments if renal function worsens     #PCP ppx  Stop Bactrim as steroids are off after 4/12     #Recurrent bacterial pneumonia  - Patient has completed multiple treatment courses of remdesivir throughout hospitalization in addition to 7 days of ceftriaxone.  - BAL cultures in 2/2024 positive for MDR Pseudomonas and stenotrophomonas treated with 10d course of Levaquin  - CT C/A/P 3/10  with persistent groundglass opacities and changes suggestive of sequelae of COVID, prior infections.  Completed 10d course of cefepime for broad spectrum coverage (completed (3/13)  - Repeat BAL cultures from 3/11 showing 4+ growth Stenotrophomonas maltophilia. Could be colonization given prior BAL growth of same, however due to recent intubation with prolonged corticosteroid theraphy, will begin treating as true pathogen. Patient otherwise remains afebrile, no leukocytosis. CRP with down trending.  Previously on Bactrim from 3/13 to 3/18, discontinued due to worsening ELIESER  Plan:  - S/P 14d Minocycline course to tx stenotrophomonas PNA, completed 3/27   - IV steroids as above  - Minocycline as sputum Cx growing steno (3/31) - off 4/11        MSK/Skin:  #Midline incision wound with poor wound healing-  - General surgery performed staple removal of the patient's midline incision on 3/12 with some skin signs appreciated at the inferior aspect of the wound without obvious pus drainage. Overnight 3/13, wound dehiscence progressed requiring general surgery reevaluation. Red/brown aspirate noted, fascia intact. Wet-to-dry dressings in place. General surgery following for next Epson wound care.  - Poor wound healing in setting of high-dose steroid therapy.  No  exam no obvious signs of infection at this time.   - S/P wound vac replacement 3/13 as per gen surgery. No active concerns for cellulitis.  Plan:  - Wound VAC management as per general surgery  - Wound care for peritracheostomy wound  - Abdominal wound care as per general surgery  - Routine monitoring     #Critical illness myopathy  - Likely exacerbated by high-dose steroid therapy  Plan:  - Continue to wean steroids as above  - Aggressive PT/OT once clinically stable            Disposition: Critical care    ICU Core Measures     Vented Patient  VAP Bundle  VAP bundle ordered     A: Assess, Prevent, and Manage Pain Has pain been assessed? NA  Need for changes to pain  regimen? NA   B: Both Spontaneous Awakening Trials (SATs) and Spontaneous Breathing Trials (SBTs) Plan to perform spontaneous awakening trial today? Yes   Plan to perform spontaneous breathing trial today? Yes   Obvious barriers to extubation? NA   C: Choice of Sedation RASS Goal: 0 Alert and Calm  Need for changes to sedation or analgesia regimen? No   D: Delirium CAM-ICU: Unable to perform secondary to Acute cognitive dysfunction   E: Early Mobility  Plan for early mobility? NA   F: Family Engagement Plan for family engagement today? Yes       Antibiotic Review: Patient on appropriate coverage based on culture data.     Review of Invasive Devices:    Diana Plan: Continue for accurate I/O monitoring for 48 hours        Prophylaxis:  VTE Plt <50   Stress Ulcer  covered bypantoprazole (PROTONIX) injection 40 mg [[id number]]        Significant 24hr Events     24hr events: Yesterday patient had an acute drop in her Hemoglobin prompting transfusion of 2u pRBC. Acute bleeding noted from the ileostomy, for which GI recommended CT high volume scan prior to procedure. CT revealed evidence of acute significant bleed in the stomach. Emergent EGD performed and found a single small ulcer in the mid esophagus with actively oozing (hemostasis achieved with 2 clips and epinephrine infiltration), severe ulcerations and esophagitis, and significant blood clotting of the stomach obscuring visualization despite aggressive irrigation/suction/patient repositioning; posterior wall of the stomach cannot be adequately visualized.  Plan for second look EGD when able.  Overnight, patient received 1 unit of platelets otherwise no acute events.     Subjective     Review of Systems   Unable to perform ROS: Mental status change        Objective                            Vitals I/O      Most Recent Min/Max in 24hrs   Temp 98.2 °F (36.8 °C) Temp  Min: 95 °F (35 °C)  Max: 99.3 °F (37.4 °C)   Pulse (!) 118 Pulse  Min: 100  Max: 141   Resp (!) 24  Resp  Min: 18  Max: 34   /55 BP  Min: 86/54  Max: 170/79   O2 Sat 99 % SpO2  Min: 98 %  Max: 100 %      Intake/Output Summary (Last 24 hours) at 2024 0636  Last data filed at 2024 0634  Gross per 24 hour   Intake 2328.75 ml   Output 1535 ml   Net 793.75 ml       Diet Enteral/Parenteral; Tube Feeding No Oral Diet; Nepro; Cyclic; 45; 20 hours; Prosource Protein Liquid - One Packet; Refugio Unflavored - One Packet; BID; 200; Water; Every 6 hours    Invasive Monitoring           Physical Exam   Physical Exam  Vitals and nursing note reviewed.   Skin:     General: Skin is warm and dry.   HENT:      Head: Normocephalic and atraumatic.   Neck:      Trachea: Tracheostomy present.   Cardiovascular:      Rate and Rhythm: Regular rhythm. Tachycardia present.      Heart sounds: S1 normal and S2 normal. No murmur heard.  Musculoskeletal:      Cervical back: Neck supple.      Right lower le+ Pitting Edema present.      Left lower le+ Pitting Edema present.   Abdominal: General: Bowel sounds are normal.      Palpations: Abdomen is soft.   Constitutional:       General: She is in acute distress.      Appearance: Normal appearance. She is ill-appearing.      Interventions: She is intubated.   Pulmonary:      Effort: She is intubated.      Breath sounds: Normal breath sounds.            Diagnostic Studies      EKG:   Imaging:  I have personally reviewed pertinent reports.       Medications:  Scheduled PRN   chlorhexidine, 15 mL, Q12H SARTHAK  fluconazole, 800 mg, Q24H  insulin lispro, 1-6 Units, Q6H SARTHAK  lacosamide, 100 mg, Q12H  lactulose, 20 g, TID  nystatin, , BID  pantoprazole, 40 mg, Q12H SARTHAK  piperacillin-tazobactam, 4.5 g, Q8H  polyethylene glycol, 17 g, Daily  sodium chloride, 2 spray, BID  sodium chloride, 4 mL, TID      acetaminophen, 650 mg, Q6H PRN  fentaNYL, 50 mcg, Q1H PRN  ondansetron, 4 mg, Q6H PRN  sodium chloride, 1 Application, Q1H PRN       Continuous    dexmedetomidine, 0.1-0.7 mcg/kg/hr, Last  Rate: Stopped (04/11/24 1224)  norepinephrine, 1-30 mcg/min, Last Rate: 1 mcg/min (04/11/24 1242)         Labs:    CBC    Recent Labs     04/13/24  0554 04/13/24  1246 04/13/24  1945 04/14/24  0521   WBC 14.75*  --  13.09* 12.42*   HGB 6.7*   < > 7.1* 6.0*   HCT 20.7*   < > 21.4* 17.9*   PLT 69*  --  43* 77*   BANDSPCT 20*  --  18*  --     < > = values in this interval not displayed.     BMP    Recent Labs     04/13/24  0554 04/14/24  0521   SODIUM 150* 153*   K 3.5 3.2*   * 119*   CO2 17* 18*   AGAP 15* 16*   BUN 70* 76*   CREATININE 1.36* 1.31*   CALCIUM 9.9 10.1       Coags    No recent results     Additional Electrolytes  Recent Labs     04/13/24  0554 04/14/24  0521   MG 2.0 1.9   PHOS 5.1* 5.9*   CAIONIZED  --  1.39*          Blood Gas    No recent results  No recent results LFTs  Recent Labs     04/13/24  0554 04/14/24  0521   ALT 31 25   AST 16 14   ALKPHOS 743* 307*   ALB 2.0* 2.5*   TBILI 1.30* 1.48*       Infectious  No recent results  Glucose  Recent Labs     04/12/24  1656 04/13/24  0554 04/14/24  0521   GLUC 243* 254* 127               Livan Morfin DO

## 2024-04-14 NOTE — QUICK NOTE
Blood cultures 4/12 noted to have yeast again.  Will add micafungin.  Consider CT A/P to assess for adequate drainage of intraabdominal collection which seems to be source of initial fungemia.  All recent central lines have been removed.

## 2024-04-15 LAB
ALBUMIN SERPL BCP-MCNC: 2.1 G/DL (ref 3.5–5)
ALP SERPL-CCNC: 292 U/L (ref 34–104)
ALT SERPL W P-5'-P-CCNC: 25 U/L (ref 7–52)
ANION GAP SERPL CALCULATED.3IONS-SCNC: 16 MMOL/L (ref 4–13)
ANISOCYTOSIS BLD QL SMEAR: PRESENT
ANISOCYTOSIS BLD QL SMEAR: PRESENT
APICAL FOUR CHAMBER EJECTION FRACTION: 61 %
AST SERPL W P-5'-P-CCNC: 15 U/L (ref 13–39)
BASOPHILS # BLD MANUAL: 0 THOUSAND/UL (ref 0–0.1)
BASOPHILS # BLD MANUAL: 0 THOUSAND/UL (ref 0–0.1)
BASOPHILS NFR MAR MANUAL: 0 % (ref 0–1)
BASOPHILS NFR MAR MANUAL: 0 % (ref 0–1)
BILIRUB SERPL-MCNC: 1.67 MG/DL (ref 0.2–1)
BLASTS NFR BLD MANUAL: 1 %
BSA FOR ECHO PROCEDURE: 2.02 M2
BUN SERPL-MCNC: 73 MG/DL (ref 5–25)
BURR CELLS BLD QL SMEAR: PRESENT
CALCIUM ALBUM COR SERPL-MCNC: 11.2 MG/DL (ref 8.3–10.1)
CALCIUM SERPL-MCNC: 9.7 MG/DL (ref 8.4–10.2)
CHLORIDE SERPL-SCNC: 117 MMOL/L (ref 96–108)
CO2 SERPL-SCNC: 16 MMOL/L (ref 21–32)
CREAT SERPL-MCNC: 1.23 MG/DL (ref 0.6–1.3)
EOSINOPHIL # BLD MANUAL: 0 THOUSAND/UL (ref 0–0.4)
EOSINOPHIL # BLD MANUAL: 0 THOUSAND/UL (ref 0–0.4)
EOSINOPHIL NFR BLD MANUAL: 0 % (ref 0–6)
EOSINOPHIL NFR BLD MANUAL: 0 % (ref 0–6)
ERYTHROCYTE [DISTWIDTH] IN BLOOD BY AUTOMATED COUNT: 18.3 % (ref 11.6–15.1)
ERYTHROCYTE [DISTWIDTH] IN BLOOD BY AUTOMATED COUNT: 19.2 % (ref 11.6–15.1)
FUNGUS SPEC CULT: ABNORMAL
GFR SERPL CREATININE-BSD FRML MDRD: 48 ML/MIN/1.73SQ M
GIANT PLATELETS BLD QL SMEAR: PRESENT
GLUCOSE SERPL-MCNC: 158 MG/DL (ref 65–140)
GLUCOSE SERPL-MCNC: 165 MG/DL (ref 65–140)
GLUCOSE SERPL-MCNC: 165 MG/DL (ref 65–140)
GLUCOSE SERPL-MCNC: 179 MG/DL (ref 65–140)
GLUCOSE SERPL-MCNC: 184 MG/DL (ref 65–140)
HCT VFR BLD AUTO: 23.4 % (ref 34.8–46.1)
HCT VFR BLD AUTO: 23.5 % (ref 34.8–46.1)
HELMET CELLS BLD QL SMEAR: PRESENT
HGB BLD-MCNC: 8 G/DL (ref 11.5–15.4)
HGB BLD-MCNC: 8.1 G/DL (ref 11.5–15.4)
LYMPHOCYTES # BLD AUTO: 0.47 THOUSAND/UL (ref 0.6–4.47)
LYMPHOCYTES # BLD AUTO: 0.55 THOUSAND/UL (ref 0.6–4.47)
LYMPHOCYTES # BLD AUTO: 3 % (ref 14–44)
LYMPHOCYTES # BLD AUTO: 3 % (ref 14–44)
MAGNESIUM SERPL-MCNC: 1.7 MG/DL (ref 1.9–2.7)
MCH RBC QN AUTO: 28.4 PG (ref 26.8–34.3)
MCH RBC QN AUTO: 28.7 PG (ref 26.8–34.3)
MCHC RBC AUTO-ENTMCNC: 34 G/DL (ref 31.4–37.4)
MCHC RBC AUTO-ENTMCNC: 34.6 G/DL (ref 31.4–37.4)
MCV RBC AUTO: 83 FL (ref 82–98)
MCV RBC AUTO: 83 FL (ref 82–98)
MICROCYTES BLD QL AUTO: PRESENT
MICROCYTES BLD QL AUTO: PRESENT
MONOCYTES # BLD AUTO: 0.31 THOUSAND/UL (ref 0–1.22)
MONOCYTES # BLD AUTO: 0.55 THOUSAND/UL (ref 0–1.22)
MONOCYTES NFR BLD: 2 % (ref 4–12)
MONOCYTES NFR BLD: 3 % (ref 4–12)
NEUTROPHILS # BLD MANUAL: 14.72 THOUSAND/UL (ref 1.85–7.62)
NEUTROPHILS # BLD MANUAL: 17.08 THOUSAND/UL (ref 1.85–7.62)
NEUTS BAND NFR BLD MANUAL: 39 % (ref 0–8)
NEUTS BAND NFR BLD MANUAL: 53 % (ref 0–8)
NEUTS SEG NFR BLD AUTO: 41 % (ref 43–75)
NEUTS SEG NFR BLD AUTO: 55 % (ref 43–75)
NRBC BLD AUTO-RTO: 13 /100 WBC (ref 0–2)
NRBC BLD AUTO-RTO: 21 /100 WBC (ref 0–2)
PHOSPHATE SERPL-MCNC: 6 MG/DL (ref 2.7–4.5)
PLATELET # BLD AUTO: 72 THOUSANDS/UL (ref 149–390)
PLATELET # BLD AUTO: 77 THOUSANDS/UL (ref 149–390)
PLATELET BLD QL SMEAR: ABNORMAL
PLATELET BLD QL SMEAR: ABNORMAL
POIKILOCYTOSIS BLD QL SMEAR: PRESENT
POIKILOCYTOSIS BLD QL SMEAR: PRESENT
POTASSIUM SERPL-SCNC: 3.7 MMOL/L (ref 3.5–5.3)
PROT SERPL-MCNC: 3.7 G/DL (ref 6.4–8.4)
RBC # BLD AUTO: 2.82 MILLION/UL (ref 3.81–5.12)
RBC # BLD AUTO: 2.82 MILLION/UL (ref 3.81–5.12)
RBC MORPH BLD: PRESENT
RBC MORPH BLD: PRESENT
SL CV LV EF: 65
SODIUM SERPL-SCNC: 149 MMOL/L (ref 135–147)
WBC # BLD AUTO: 15.66 THOUSAND/UL (ref 4.31–10.16)
WBC # BLD AUTO: 18.17 THOUSAND/UL (ref 4.31–10.16)

## 2024-04-15 PROCEDURE — 99233 SBSQ HOSP IP/OBS HIGH 50: CPT | Performed by: PHYSICIAN ASSISTANT

## 2024-04-15 PROCEDURE — C9113 INJ PANTOPRAZOLE SODIUM, VIA: HCPCS | Performed by: EMERGENCY MEDICINE

## 2024-04-15 PROCEDURE — 99233 SBSQ HOSP IP/OBS HIGH 50: CPT | Performed by: STUDENT IN AN ORGANIZED HEALTH CARE EDUCATION/TRAINING PROGRAM

## 2024-04-15 PROCEDURE — 94640 AIRWAY INHALATION TREATMENT: CPT

## 2024-04-15 PROCEDURE — 94003 VENT MGMT INPAT SUBQ DAY: CPT

## 2024-04-15 PROCEDURE — 99232 SBSQ HOSP IP/OBS MODERATE 35: CPT | Performed by: INTERNAL MEDICINE

## 2024-04-15 PROCEDURE — 80053 COMPREHEN METABOLIC PANEL: CPT

## 2024-04-15 PROCEDURE — 94760 N-INVAS EAR/PLS OXIMETRY 1: CPT

## 2024-04-15 PROCEDURE — 94664 DEMO&/EVAL PT USE INHALER: CPT

## 2024-04-15 PROCEDURE — 83735 ASSAY OF MAGNESIUM: CPT

## 2024-04-15 PROCEDURE — 82948 REAGENT STRIP/BLOOD GLUCOSE: CPT

## 2024-04-15 PROCEDURE — 84100 ASSAY OF PHOSPHORUS: CPT

## 2024-04-15 PROCEDURE — 85007 BL SMEAR W/DIFF WBC COUNT: CPT

## 2024-04-15 PROCEDURE — C9254 INJECTION, LACOSAMIDE: HCPCS | Performed by: STUDENT IN AN ORGANIZED HEALTH CARE EDUCATION/TRAINING PROGRAM

## 2024-04-15 PROCEDURE — 85027 COMPLETE CBC AUTOMATED: CPT

## 2024-04-15 PROCEDURE — 99233 SBSQ HOSP IP/OBS HIGH 50: CPT | Performed by: INTERNAL MEDICINE

## 2024-04-15 PROCEDURE — 99024 POSTOP FOLLOW-UP VISIT: CPT | Performed by: SURGERY

## 2024-04-15 RX ORDER — HEPARIN SODIUM 5000 [USP'U]/ML
5000 INJECTION, SOLUTION INTRAVENOUS; SUBCUTANEOUS EVERY 8 HOURS SCHEDULED
Status: DISCONTINUED | OUTPATIENT
Start: 2024-04-15 | End: 2024-04-17

## 2024-04-15 RX ORDER — MAGNESIUM SULFATE HEPTAHYDRATE 40 MG/ML
2 INJECTION, SOLUTION INTRAVENOUS ONCE
Status: COMPLETED | OUTPATIENT
Start: 2024-04-15 | End: 2024-04-15

## 2024-04-15 RX ORDER — PANTOPRAZOLE SODIUM 40 MG/10ML
40 INJECTION, POWDER, LYOPHILIZED, FOR SOLUTION INTRAVENOUS EVERY 12 HOURS SCHEDULED
Status: DISCONTINUED | OUTPATIENT
Start: 2024-04-15 | End: 2024-04-18

## 2024-04-15 RX ADMIN — PANTOPRAZOLE SODIUM 40 MG: 40 INJECTION, POWDER, FOR SOLUTION INTRAVENOUS at 09:47

## 2024-04-15 RX ADMIN — POLYETHYLENE GLYCOL 3350 17 G: 17 POWDER, FOR SOLUTION ORAL at 09:47

## 2024-04-15 RX ADMIN — INSULIN LISPRO 1 UNITS: 100 INJECTION, SOLUTION INTRAVENOUS; SUBCUTANEOUS at 13:00

## 2024-04-15 RX ADMIN — Medication 2 SPRAY: at 09:47

## 2024-04-15 RX ADMIN — SODIUM CHLORIDE SOLN NEBU 3% 4 ML: 3 NEBU SOLN at 14:20

## 2024-04-15 RX ADMIN — PIPERACILLIN SODIUM AND TAZOBACTAM SODIUM 4.5 G: 36; 4.5 INJECTION, POWDER, LYOPHILIZED, FOR SOLUTION INTRAVENOUS at 09:47

## 2024-04-15 RX ADMIN — HEPARIN SODIUM 5000 UNITS: 5000 INJECTION INTRAVENOUS; SUBCUTANEOUS at 13:47

## 2024-04-15 RX ADMIN — LACTULOSE 20 G: 20 SOLUTION ORAL at 17:22

## 2024-04-15 RX ADMIN — INSULIN LISPRO 1 UNITS: 100 INJECTION, SOLUTION INTRAVENOUS; SUBCUTANEOUS at 17:23

## 2024-04-15 RX ADMIN — INSULIN LISPRO 1 UNITS: 100 INJECTION, SOLUTION INTRAVENOUS; SUBCUTANEOUS at 05:26

## 2024-04-15 RX ADMIN — INSULIN LISPRO 1 UNITS: 100 INJECTION, SOLUTION INTRAVENOUS; SUBCUTANEOUS at 00:23

## 2024-04-15 RX ADMIN — CHLORHEXIDINE GLUCONATE 0.12% ORAL RINSE 15 ML: 1.2 LIQUID ORAL at 09:42

## 2024-04-15 RX ADMIN — LACTULOSE 20 G: 20 SOLUTION ORAL at 09:42

## 2024-04-15 RX ADMIN — SODIUM CHLORIDE SOLN NEBU 3% 4 ML: 3 NEBU SOLN at 07:00

## 2024-04-15 RX ADMIN — PIPERACILLIN SODIUM AND TAZOBACTAM SODIUM 4.5 G: 36; 4.5 INJECTION, POWDER, LYOPHILIZED, FOR SOLUTION INTRAVENOUS at 00:18

## 2024-04-15 RX ADMIN — PANTOPRAZOLE SODIUM 40 MG: 40 INJECTION, POWDER, FOR SOLUTION INTRAVENOUS at 23:11

## 2024-04-15 RX ADMIN — LACOSAMIDE 100 MG: 10 INJECTION, SOLUTION INTRAVENOUS at 23:11

## 2024-04-15 RX ADMIN — LACOSAMIDE 100 MG: 10 INJECTION, SOLUTION INTRAVENOUS at 09:30

## 2024-04-15 RX ADMIN — NYSTATIN: 100000 POWDER TOPICAL at 17:22

## 2024-04-15 RX ADMIN — HEPARIN SODIUM 5000 UNITS: 5000 INJECTION INTRAVENOUS; SUBCUTANEOUS at 23:11

## 2024-04-15 RX ADMIN — Medication 800 MG: at 09:47

## 2024-04-15 RX ADMIN — PIPERACILLIN SODIUM AND TAZOBACTAM SODIUM 4.5 G: 36; 4.5 INJECTION, POWDER, LYOPHILIZED, FOR SOLUTION INTRAVENOUS at 16:47

## 2024-04-15 RX ADMIN — SODIUM CHLORIDE SOLN NEBU 3% 4 ML: 3 NEBU SOLN at 19:11

## 2024-04-15 RX ADMIN — NYSTATIN: 100000 POWDER TOPICAL at 09:48

## 2024-04-15 RX ADMIN — CHLORHEXIDINE GLUCONATE 0.12% ORAL RINSE 15 ML: 1.2 LIQUID ORAL at 23:11

## 2024-04-15 RX ADMIN — MAGNESIUM SULFATE HEPTAHYDRATE 2 G: 40 INJECTION, SOLUTION INTRAVENOUS at 06:38

## 2024-04-15 RX ADMIN — DEXTROSE 50 ML/HR: 5 SOLUTION INTRAVENOUS at 01:14

## 2024-04-15 RX ADMIN — MICAFUNGIN SODIUM 100 MG: 50 INJECTION, POWDER, LYOPHILIZED, FOR SOLUTION INTRAVENOUS at 10:13

## 2024-04-15 NOTE — PROGRESS NOTES
[facility] - Inpatient Progress Note    PATIENT INFORMATION      Carla Hunt 59 y.o. female MRN: [id number]  Unit/Bed#: MICU 10 Encounter: [id number]    ASSESSMENT & PLAN   Carla Hunt is a 59-year-old female with history including but not limited to B-cell lymphoma involving bone marrow s/p chemo previously on Rituxan, seizure disorder s/p partial left upper lobe resection, CKD, DM with current prolonged and complicated hospital course after being admitted for encephalopathy, acute hypoxic respiratory failure in setting of COVID infection now s/p trach placement.  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 Hospitalization has also been complicated by candidemia, pelvic abscesses s/p IR drain placement, DVT, embolic strokes. GI re-consulted over the weekend for GIB s/p EGD x2 with treatment of esophageal and gastric ulcers.    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 4/13 esophageal ulcer actively bleeding s/p 2 clips with epi injected for hemostasis, severe esophagitis and ulcerations  with stomach obscured by clot. Concern for ongoing bleeding and repeat EGD on 4/14 with removal of clot exposing underlying Zaki 1A gastric ulcer s/p clips, bipolar, and epi with hemostasis. Patient now with stable hemoglobin at 8.1 from 8.5 without significant output for ostomy.   Transition to PPI drip x72 hours and then can transition to PO/per NGT dosing  Can initiate tube feeds from GI perspective per CC team  Will consider repeat EGD if concern for ongoing bleeding  Otherwise pending clinical course would recommend repeat in 3 months  Monitor stool output closely  Monitor hgb and transfuse for <7.0  Maintain active type and screen      SUBJECTIVE     Patient seen and evaluated at  bedside. S/p trach placement. Off sedation and unable to respond/follow commands. Did receive sedation on 4/14 for EGD, and per CC team sedation takes a long time for patient to clear. Unable to provide subjective input at this time    MEDICATIONS & ALLERGIES       Medications:   Medications Prior to Admission   Medication    busPIRone (BUSPAR) 5 mg tablet    escitalopram (LEXAPRO) 10 mg tablet    ibuprofen (MOTRIN) 600 mg tablet    lamoTRIgine (LaMICtal) 200 MG tablet    lamoTRIgine (LaMICtal) 200 MG tablet    ondansetron (ZOFRAN-ODT) 4 mg disintegrating tablet    topiramate (TOPAMAX) 100 mg tablet    topiramate (TOPAMAX) 100 mg tablet    venlafaxine (EFFEXOR-XR) 75 mg 24 hr capsule     Current Facility-Administered Medications   Medication Dose Route Frequency    acetaminophen (TYLENOL) tablet 650 mg  650 mg Oral Q6H PRN    chlorhexidine (PERIDEX) 0.12 % oral rinse 15 mL  15 mL Mouth/Throat Q12H SARTHAK    dexmedeTOMIDine (Precedex) 400 mcg in sodium chloride 0.9% 100 mL  0.1-0.7 mcg/kg/hr Intravenous Titrated    dextrose 5 % infusion  50 mL/hr Intravenous Continuous    fentaNYL injection 50 mcg  50 mcg Intravenous Q1H PRN    fluconazole (DIFLUCAN) (HIGH DOSE) IVPB 800 mg  800 mg Intravenous Q24H    insulin lispro (HumALOG/ADMELOG) 100 units/mL subcutaneous injection 1-6 Units  1-6 Units Subcutaneous Q6H SARTHAK    lacosamide (VIMPAT) injection 100 mg  100 mg Intravenous Q12H    lactulose (CHRONULAC) oral solution 20 g  20 g Oral BID    magnesium sulfate 2 g/50 mL IVPB (premix) 2 g  2 g Intravenous Once    micafungin (MYCAMINE) 100 mg in sodium chloride 0.9 % 100 mL IVPB  100 mg Intravenous Q24H    norepinephrine (LEVOPHED) 4 mg (STANDARD CONCENTRATION) IV in sodium chloride 0.9% 250 mL  1-30 mcg/min Intravenous Titrated    nystatin (MYCOSTATIN) powder   Topical BID    ondansetron (ZOFRAN) injection 4 mg  4 mg Intravenous Q6H PRN    pantoprazole (PROTONIX) injection 40 mg  40 mg Intravenous Q12H SARTHAK     "piperacillin-tazobactam (ZOSYN) 4.5 g in sodium chloride 0.9 % 100 mL IVPB (EXTENDED INFUSION)  4.5 g Intravenous Q8H    polyethylene glycol (MIRALAX) packet 17 g  17 g Per NG Tube Daily    sodium chloride (AYR SALINE NASAL) nasal gel 1 Application  1 Application Nasal Q1H PRN    sodium chloride (OCEAN) 0.65 % nasal spray 2 spray  2 spray Each Nare BID    sodium chloride 3 % inhalation solution 4 mL  4 mL Nebulization TID       Allergies:   No Known Allergies    PHYSICAL EXAM     Objective   Blood pressure 122/62, pulse (!) 112, temperature 97.7 °F (36.5 °C), resp. rate 19, height 5' 8\" (1.727 m), weight 88 kg (194 lb 0.1 oz), SpO2 99%. Body mass index is 29.5 kg/m².    Intake/Output Summary (Last 24 hours) at 4/15/2024 0834  Last data filed at 4/15/2024 0601  Gross per 24 hour   Intake 2473.74 ml   Output 810 ml   Net 1663.74 ml       General Appearance:   Does not respond to verbal/tactile stimuli   HEENT:   Normocephalic, atraumatic, anicteric    Neck:   Supple, symmetrical, s/p trach placement   Lungs:   Equal chest rise, mechanically ventilated   Heart:   Tachycardic   Abdomen:   Soft, distended; midline wound with dressing and drain in place   Rectal:   Deferred    Extremities:   No cyanosis, clubbing, diffuse anasarca   Neuro:   Does not respond/follow commands, off sedation   Skin:   No jaundice, rashes     ADDITIONAL DATA     Lab Results:     Results from last 7 days   Lab Units 04/15/24  0521   WBC Thousand/uL 15.66*   HEMOGLOBIN g/dL 8.1*   HEMATOCRIT % 23.4*   PLATELETS Thousands/uL 72*   LYMPHO PCT % 3*   MONO PCT % 2*   EOS PCT % 0     Results from last 7 days   Lab Units 04/15/24  0521   POTASSIUM mmol/L 3.7   CHLORIDE mmol/L 117*   CO2 mmol/L 16*   BUN mg/dL 73*   CREATININE mg/dL 1.23   CALCIUM mg/dL 9.7   ALK PHOS U/L 292*   ALT U/L 25   AST U/L 15     Results from last 7 days   Lab Units 04/09/24  0446   INR  1.40*       Imaging:    XR chest portable ICU    Result Date: 4/15/2024  Narrative: XR " CHEST PORTABLE ICU INDICATION: NGT placement. COMPARISON: 04/08/2024 FINDINGS: Clear lungs. No pneumothorax or pleural effusion. Midline tracheostomy. Enteric tube extends into the stomach. Normal cardiomediastinal silhouette. Bones are unremarkable for age. Normal upper abdomen.     Impression: No acute cardiopulmonary disease. Enteric tube extends into the stomach. Workstation performed: [id number]     EGD    Result Date: 4/14/2024  Narrative: Table formatting from the original result was not included. [facility] [address] [facility] [address] [phone] DATE OF SERVICE: 4/14/24 PHYSICIAN(S): Attending: No Staff Documented Fellow: No Staff Documented INDICATION: Hematemesis, Hemorrhagic shock (HCC), Gastric ulcer POST-OP DIAGNOSIS: See the impression below. PREPROCEDURE: Informed consent was obtained for the procedure, including sedation.  Risks of perforation, hemorrhage, adverse drug reaction and aspiration were discussed. The patient was placed in the left lateral decubitus position. Patient was explained about the risks and benefits of the procedure. Risks including but not limited to bleeding, infection, and perforation were explained in detail. Also explained about less than 100% sensitivity with the exam and other alternatives. PROCEDURE: EGD DETAILS OF PROCEDURE: Patient was taken to the procedure room where a time out was performed to confirm correct patient and correct procedure. The patient was already under sedation prior to the procedure. The patient's blood pressure, heart rate, level of consciousness, respirations, oxygen, ECG and ETCO2 were monitored throughout the procedure. The scope was introduced through the mouth and advanced to the second part of the duodenum. Retroflexion was performed in the fundus. The patient experienced no blood loss. The procedure was not difficult. The patient tolerated the procedure well. There were no apparent adverse events.  ANESTHESIA INFORMATION: ASA: ASA status cannot be found on the log. Anesthesia Type: Anesthesia type cannot be found on the log. MEDICATIONS: Totals unavailable because the procedure time range is not set FINDINGS: Prior treated esophageal seen again with prior placed clips. No active bleeding. Significant amount of blood clotting in the stomach, which obscured visualization. Removed with suction, snare and retrieval basket. Single small, superficial, irregular ulcer in the stomach with spurting hemorrhage (Zaki IA); placed 4 clips successfully (clips are MRI conditional); hemostasis achieved; induced coagulation and hemostasis achieved with with bipolar cautery; injected 2 mL of epinephrine to address bleeding; hemostasis achieved Attempted to pass OVESCO clip but unable to traverse UES stricture. Good window for PEG tube visualized in case required in future. Other SPECIMENS: * Cannot find log *     Impression: Significant clotting in the stomach, clots were removed for evaluation of underlying mucosa. Multiple gastric ulcers visualized, one of the with adherant clot which was suctioned revealing spurting hemorrhage (Zaki 1A). Treated with placement of 4 clips, 2 cc epinephrine injection and bipolar cautery. Attempted to pass the OVESCO clip but unsuccessful given UES stricture. Previously treated esophageal ulcer without bleeding. Good window for PEG if required in the future. NG tube replaced. RECOMMENDATION:  Schedule repeat EGD Xray to confirm NG placement Avoid Tube feeds for 24 hours atleast. Ok to use NG tube for medications or suction. Will suggest low intermittent suction if required. If suspicion for re-bleeding, consider repeat endoscopy. She remains at risk for re bleeding given overall clinical picture. IV PPI BID Repeat EGD in 3 months pending clinical progression. D/W ICU team and family. No Staff Documented     EGD    Result Date: 4/14/2024  Narrative: Table formatting from the original  result was not included. [facility] [address] [place] [place] [address] [phone] DATE OF SERVICE: 4/13/24 PHYSICIAN(S): Attending: Alireza Lea MD Fellow: Landon Calle MD INDICATION: Hemorrhagic shock (HCC) POST-OP DIAGNOSIS: See the impression below. PREPROCEDURE: Informed consent was obtained for the procedure, including sedation.  Risks of perforation, hemorrhage, adverse drug reaction and aspiration were discussed. The patient was placed in the left lateral decubitus position. Patient was explained about the risks and benefits of the procedure. Risks including but not limited to bleeding, infection, and perforation were explained in detail. Also explained about less than 100% sensitivity with the exam and other alternatives. PROCEDURE: EGD DETAILS OF PROCEDURE: Patient was taken to the procedure room where a time out was performed to confirm correct patient and correct procedure. The patient underwent monitored anesthesia care, which was administered by an anesthesia professional. The patient's blood pressure, heart rate, level of consciousness, respirations, oxygen, ECG and ETCO2 were monitored throughout the procedure. The scope was introduced through the mouth and advanced to the second part of the duodenum. Retroflexion was performed in the fundus. The patient experienced no blood loss. The procedure was not difficult. The patient tolerated the procedure well. There were no apparent adverse events. ANESTHESIA INFORMATION: ASA: ASA status not filed in the log. Anesthesia Type: Anesthesia type not filed in the log. MEDICATIONS: Totals unavailable because the procedure time range is not set FINDINGS: Benign-appearing stricture (traversable) in the cricopharynx Single small, superficial, irregular ulcer in the middle third of the esophagus (24 cm from the incisors) with oozing hemorrhage (Zaki IB); placed 2 clips successfully (clips are MRI incompatible and MRI conditional);  hemostasis achieved; injected 3 mL of epinephrine to address bleeding; hemostasis achieved Severe, generalized grade D esophagitis with multiple mucosal breaks measuring 5 mm or more, continuous between folds, covering 75% or more of the circumference, showing erythematous, friable and ulcerated mucosa in the middle third of the esophagus and lower third of the esophagus Significant amount of blood clotting in the stomach Severe, generalized edematous and ulcerated mucosa with erosion in the stomach, consistent with gastritis The 1st part of the duodenum and 2nd part of the duodenum appeared normal. SPECIMENS: * No specimens in log *     Impression: Single ulcer in mid-esophagus with active oozing (Zaki 1B); hemostasis achieved with placement of 2 clips and epinephrine infiltration. Severe ulcerations and esophagitis. Significant blood clotting in the stomach obscuring visualization despite aggressive irrigation, suction and patient repositioning. Visualized areas showed multiple areas of linear ulcerations with clean base. Posterior wall of the stomach could not be adequately visualized. Normal duodenum. 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 RECOMMENDATION:  Schedule follow-up procedure for continued treatment Avoid NG tube placement for 24-48 hours. Will consider endoscopic NG tube placement at the time. IV PPI BID till in the hospital. IV Reglan Q6H. Consider second look endoscopy depending on the clinical course. NPO till further evaluation. Recommendations discussed with ICU team at bedside.  Alireza Lea MD     CT high volume bleeding scan abdomen pelvis    Result Date: 4/13/2024  Narrative: CT ABDOMEN AND PELVIS - WITHOUT AND WITH IV CONTRAST INDICATION:   GI bleed. COMPARISON: CT chest abdomen pelvis 4/9/2024. TECHNIQUE: CT examination of the abdomen and pelvis was performed both prior to and after the administration of intravenous contrast. Multiplanar 2D reformatted images were created from the source  data. Radiation dose length product (DLP) for this visit: 2202 mGy-cm . This examination, like all CT scans performed in the [facility], was performed utilizing techniques to minimize radiation dose exposure, including the use of iterative reconstruction and automated exposure control. IV Contrast: 85 mL of iohexol (OMNIPAQUE) Enteric Contrast: Not administered. FINDINGS: ABDOMEN BOWEL: Nasogastric tube is present. The stomach is distended with mixed density material. Along the posterior aspect of the gastric body there is a small hyperattenuating focus on the arterial phase that increases in size and density on the venous phase upon which there is also development of additional surrounding hyperdensities. This is best demonstrated on series 305 images 89 and 93, and concerning for active hemorrhage. Dependent high attenuation material in the fundus increases during the exam likely reflecting pooling blood. No other convincing sites of active bleeding identified. A focus of hyperattenuation in the proximal duodenum seen only on the arterial phase is nonspecific with no correlate or definite extravasation of blood products on the venous phase. Hyperattenuation within the small bowel at the level of the ostomy has a stable appearance across all 3 phases and likely reflects high attenuation intraluminal contents. No dilated loops of bowel. Diffuse mild small bowel wall thickening is likely due to reactive enteritis. Colonic diverticulosis is present without evidence of acute diverticulitis.. LOWER CHEST: Unchanged pulmonary opacities. LIVER/BILIARY TREE: Unremarkable. GALLBLADDER: Incompletely distended limiting assessment. No calcified stones. SPLEEN: Unremarkable. PANCREAS: Unremarkable. ADRENAL GLANDS: Unremarkable. KIDNEYS/URETERS: Nephrocalcinosis and multiple circumscribed lesions of various density similar to prior exam. APPENDIX: No findings to suggest appendicitis. ABDOMINOPELVIC CAVITY:  Stable to decreased small volume hemoperitoneum with drainage catheter present in the pelvis. No loculated collections. No pneumoperitoneum. No lymphadenopathy. VESSELS: Infrarenal IVC filter in stable position. No aneurysm. PELVIS REPRODUCTIVE ORGANS: Unremarkable for patient's age. URINARY BLADDER: Ortiz catheter in place. Distended with excreted contrast. No wall thickening. ABDOMINAL WALL/INGUINAL REGIONS: Large abdominal wall defect with end ileostomy. Decreased size with redistribution of hematoma in the abdominal wall. BONES: No acute fracture or suspicious osseous lesion.     Impression: Findings concerning for active bleeding in the stomach as described above. I personally discussed this study with GEOVANI STRONG on 4/13/2024 5:53 PM. Workstation performed: [id number]     CTA head w wo contrast    Result Date: 4/12/2024  Narrative: CTA BRAIN WITH CONTRAST INDICATION: r/o mycotic aneursym COMPARISON:   4/9/2024; 4/5/2024; 4/6/2024; 4/10/2024; 1/7/2024 TECHNIQUE:   Post contrast imaging was performed after administration of iodinated contrast through the neck and brain. Post contrast axial 0.625 mm images timed to opacify the arterial system. 3D rendering was performed on an independent workstation.   MIP reconstructions performed. Coronal reconstructions were performed of the noncontrast portion of the brain. Radiation dose length product (DLP) for this visit:  1498.58 mGy-cm .  This examination, like all CT scans performed in the [facility], was performed utilizing techniques to minimize radiation dose exposure, including the use of iterative reconstruction and automated exposure control. IV Contrast:  85 mL of iohexol (OMNIPAQUE) IMAGE QUALITY:   Diagnostic FINDINGS: NONCONTRAST BRAIN: PARENCHYMA: Small petechial/juxtacortical hemorrhage in the left parietal lobe towards the vertex, possibly hemorrhagic transformation in the region of recent infarction. The hematoma/small amount of  hemorrhage is stable. No significant mass effect. Large resection cavity in the left middle cranial fossa redemonstrated indicative of prior left temporal lobectomy. Small mount of periventricular hypodensities are stable. Bilaterally symmetric coarse basal ganglia and dentate nuclei calcifications redemonstrated. Atherosclerotic calcifications noted. VENTRICLES AND EXTRA-AXIAL SPACES: Stable ventricular size without hydrocephalus. Left temporal lobectomy redemonstrated. VISUALIZED ORBITS: Normal visualized orbits. PARANASAL SINUSES: Bilateral mastoid air cell effusions noted. Minimal ethmoidal and maxillary sinus disease. Right-sided NG tube noted. INTRACRANIAL VASCULATURE INTERNAL CAROTID ARTERIES: Atherosclerotic calcifications of the cavernous segment of the internal carotid artery are mild..  Normal ophthalmic artery origins.  Normal ICA terminus. ANTERIOR CIRCULATION:  Symmetric A1 segments and anterior cerebral arteries with normal enhancement.  Normal anterior communicating artery. MIDDLE CEREBRAL ARTERY CIRCULATION:  M1 segment and middle cerebral artery branches demonstrate normal enhancement bilaterally. DISTAL VERTEBRAL ARTERIES:  Normal distal vertebral arteries.  Posterior inferior cerebellar artery origins are normal. Normal vertebral basilar junction. BASILAR ARTERY:  Basilar artery is normal in caliber.  Normal superior cerebellar arteries. POSTERIOR CEREBRAL ARTERIES: Both posterior cerebral arteries arises from the basilar tip.  Both arteries demonstrate normal enhancement.   Normal posterior communicating arteries. DURAL VENOUS SINUSES:  Normal. NON VASCULAR ANATOMY BONY STRUCTURES: Left pterional craniotomy noted.     Impression: 1. Stable head CT. Small amount of juxtacortical hemorrhage in the left parietal lobe posterior superiorly towards the vertex possibly hemorrhagic transformation of a small recent infarction as designated on recent prior brain MRIs. No new hemorrhage or significant mass  effect. 2. No intracranial aneurysm or major intracranial arterial stenosis. No CTA evidence of mycotic aneurysm or explanation for the small parenchymal hemorrhage in the left parietal vertex. 3. Stable left temporal lobectomy. 4. Calcifications in the basal ganglia and dentate nuclei of the cerebellum redemonstrated possibly dystrophic from prior injury or insult versus metabolic abnormalities. Differential diagnosis includes Fahr's disease. 5. Mild periventricular hypodensities redemonstrated possibly microangiopathy versus treatment related changes. These are stable. Workstation performed: [id number]     SATURNINO    Result Date: 4/11/2024  Narrative: SATURNINO cancelled due to inability to pass probe into the esophagus, despite assistance of direct visualization with bronchoscope.  Patient tolerated without difficulty.     Bronchoscopy (Bedside)    Result Date: 4/11/2024  Narrative: Clinton Mays MD     4/11/2024  5:38 PM Bronchoscopy (Bedside) Date/Time: 4/11/2024 11:00 AM Performed by: Clinton Mays MD Authorized by: Clinton Mays MD  Patient location:  Bedside Other Assisting Provider: No  Consent:   Consent obtained:  Verbal   Consent given by:  Spouse   Risks discussed:  Bleeding   Alternatives discussed:  No treatment Universal protocol:   Procedure explained and questions answered to patient or proxy's satisfaction: yes    Patient identity confirmed:  Arm band Indications:   Procedure Purpose: diagnostic    Indications: other (comment)    Indications comment:  To assist with bedside transesophageal echocardiogram Sedation:   Sedation type:  Anxiolysis Upper Airway:   Vocal cords movement comment:  Not visualized - patient has tracheostomy Airway:   Airway:  Entered airway through left nare.  Nasal cavity appears normal without polyps or active bleeding.  Epiglottis visualized and appeared normal.  The arytenoids appears edematous.  Nasogastric tube appears to be entering the esophagus at the right lateral aspect  of the arytenoids.  I can see the transesophageal echocardiogram attempting to follow the nasogastric tube but cannot see the esophageal opening.  The SATURNINO probe appears to be abutting against edematous soft tissue right lateral of the arytenoids.  With further distal insertion of the SATURNINO there is mild mucosal irration and mild bleeding.  After that the bronchoscope was advanced between the vocal cords and the superior aspect of the tracheostomy tube along with the cuff was visualized.  Some white mucus superior to that was suctioned.  Post-procedure details:   Patient tolerance of procedure:  Tolerated well, no immediate complications   Complication (if applicable):  None Final Diagnosis/Findings:    Cannot visualize esophageal aperture.  SATURNINO aborted due to risk of esophageal injury    IR drainage tube placement    Result Date: 4/10/2024  Narrative: IMAGE-GUIDED LUMBAR PUNCTURE IMAGE-GUIDED ABCESS DRAIN PLACEMENT History: Prolonged hospital stay, fluid collection in the pelvis Respiratory failure with tracheostomy. Altered mental status. Candidemia Fever and bandemia. Embolic strokes. Lumbar puncture recommended by infectious disease and neurology services. Aspiration/drainage of gluteal collection indicated for identification of infection and source control History of lymphoma, B-cell Contrast: 0 Fluoroscopy time: 0 Anesthesia: General Procedure: After explaining the risks and benefits of the procedures to the patient's spouse, informed consent was obtained. Patient is thrombocytopenic and platelets were given prior to procedure Risks of bleeding worsening sepsis damage to adjacent structures discussed CT was used to localize the pelvis and spine. Radiation dose length product (DLP) for this visit:  1082.84 mGy  (accession [id number]), 0 mGy  (accession [id number]).  This examination, like all CT scans performed in the [facility], was performed utilizing techniques to minimize radiation dose exposure,  including the use of iterative reconstruction and automated exposure control. The patient was identified verbally and by wristband. Timeout was performed. Initially we began with the clean procedure, the lumbar puncture. In the prone position, the lower back was prepped and draped in sterile fashion. 1% lidocaine was used for local anesthetic. A suitable location for puncture was identified with CT at lower lumbar level. A 20 gauge spinal needle was advanced into the CSF  space using CT guidance. Opening pressure recorded at 24 cm of water gravity drainage was used to fill 4 vials of fluid. The fluid was yellow-tinged. 25 cc were removed. The needle was removed and the puncture site was covered with sterile dressing. We now turned attention to pelvic drain placement Using intermittent CT guidance access was gained to the patient's posterior pelvic collection using an 18 gauge Dougherty needle. Pus was encountered A specimen was aspirated, placed in a sterile container and sent to microbiology for culture and sensitivity.  A 0.038 Amplatz wire was advanced into the cavity using fluoroscopic guidance.  After tract dilation, a 10 Comoran all-purpose catheter was advanced into the cavity. The catheter was secured in place with Prolene suture and placed to suction bulb drainage. 10 cc of cheesy pus was removed The patient tolerated the procedure well without apparent immediate complications and was returned to the ICU. Specimens: 25 cc CSF removed 20 cc was sent in 4 vials 5 cc was placed into flow cytometry media Pelvic abscess drainage material sent for culture aerobic, anaerobic, fungal     Impression: Impression: CT-guided pelvic 10 Comoran drain placement yielding pus, transgluteal approach CT-guided lumbar puncture, abnormal yellow tinge to the CSF fluid Opening pressure 24 cm H2O, unclear if this is a reliable indicator of elevated pressures given prone positioning, paralysis, positive pressure ventilation Workstation  performed: [id number]     FL lumbar puncture diagnostic    Result Date: 4/10/2024  Narrative: IMAGE-GUIDED LUMBAR PUNCTURE IMAGE-GUIDED ABCESS DRAIN PLACEMENT History: Prolonged hospital stay, fluid collection in the pelvis Respiratory failure with tracheostomy. Altered mental status. Candidemia Fever and bandemia. Embolic strokes. Lumbar puncture recommended by infectious disease and neurology services. Aspiration/drainage of gluteal collection indicated for identification of infection and source control History of lymphoma, B-cell Contrast: 0 Fluoroscopy time: 0 Anesthesia: General Procedure: After explaining the risks and benefits of the procedures to the patient's spouse, informed consent was obtained. Patient is thrombocytopenic and platelets were given prior to procedure Risks of bleeding worsening sepsis damage to adjacent structures discussed CT was used to localize the pelvis and spine. Radiation dose length product (DLP) for this visit:  1082.84 mGy  (accession [id number]), 0 mGy  (accession [id number]).  This examination, like all CT scans performed in the [facility], was performed utilizing techniques to minimize radiation dose exposure, including the use of iterative reconstruction and automated exposure control. The patient was identified verbally and by wristband. Timeout was performed. Initially we began with the clean procedure, the lumbar puncture. In the prone position, the lower back was prepped and draped in sterile fashion. 1% lidocaine was used for local anesthetic. A suitable location for puncture was identified with CT at lower lumbar level. A 20 gauge spinal needle was advanced into the CSF  space using CT guidance. Opening pressure recorded at 24 cm of water gravity drainage was used to fill 4 vials of fluid. The fluid was yellow-tinged. 25 cc were removed. The needle was removed and the puncture site was covered with sterile dressing. We now turned attention to pelvic drain  placement Using intermittent CT guidance access was gained to the patient's posterior pelvic collection using an 18 gauge Dougherty needle. Pus was encountered A specimen was aspirated, placed in a sterile container and sent to microbiology for culture and sensitivity.  A 0.038 Amplatz wire was advanced into the cavity using fluoroscopic guidance.  After tract dilation, a 10 Vincentian all-purpose catheter was advanced into the cavity. The catheter was secured in place with Prolene suture and placed to suction bulb drainage. 10 cc of cheesy pus was removed The patient tolerated the procedure well without apparent immediate complications and was returned to the ICU. Specimens: 25 cc CSF removed 20 cc was sent in 4 vials 5 cc was placed into flow cytometry media Pelvic abscess drainage material sent for culture aerobic, anaerobic, fungal     Impression: Impression: CT-guided pelvic 10 Vincentian drain placement yielding pus, transgluteal approach CT-guided lumbar puncture, abnormal yellow tinge to the CSF fluid Opening pressure 24 cm H2O, unclear if this is a reliable indicator of elevated pressures given prone positioning, paralysis, positive pressure ventilation Workstation performed: [id number]     MRI brain wo contrast    Result Date: 4/10/2024  Narrative: MRI BRAIN WITHOUT CONTRAST INDICATION: History of encephalopathy, multifocal ischemia and possible hemorrhage. COMPARISON:   Multiple prior examinations. Most recent MRI dated 4/6/2024. Recent CT scan dated 4/9/2024. TECHNIQUE:  Multiplanar, multisequence imaging of the brain was performed. IMAGE QUALITY:  Diagnostic. FINDINGS: BRAIN PARENCHYMA: As seen on CT scan performed yesterday there is a new small hemorrhage identified within the left inferior parietal lobe on series 5 and 6, image 20. Small adjacent foci of restricted diffusion are seen on series 3, images 23 through 25  which are new compared to the prior examination. Minor edema within the adjacent brain on T2  "and FLAIR imaging. Multiple additional small subcentimeter foci of restricted diffusion are seen within the cerebral hemispheres similar to the prior examination from 4 days ago consistent with small scattered subacute infarcts, possibly embolic in etiology. Periventricular white matter changes immediately adjacent to the surface of the lateral ventricles are stable. Patient has known dentate and basal ganglia calcifications. Stable left temporal lobectomy within the middle cranial fossa. VENTRICLES:  Normal for the patient's age. SELLA AND PITUITARY GLAND:  Normal. ORBITS:  Normal. PARANASAL SINUSES:  Normal. VASCULATURE:  Evaluation of the major intracranial vasculature demonstrates appropriate flow voids. CALVARIUM AND SKULL BASE: Extensive bilateral mastoid effusions again identified with fluid signal seen throughout the mastoid air cells, antrum and middle ear cavity bilaterally. EXTRACRANIAL SOFT TISSUES:  Normal.     Impression: As seen on the MRI from 4 days ago, there are small scattered infarcts within the cerebral hemispheres bilaterally suggesting a central embolic source. Small new infarcts are seen within the left inferior parietal lobe with a small acute hemorrhage noted, as seen on yesterday's CT scan. Findings are suggestive of additional small infarcts with focal hemorrhagic transformation. Stable postoperative appearance of the middle cranial fossa on the left status post temporal lobectomy. Complete opacification of the mastoid temporal bones bilaterally is again seen. This examination was marked \"immediate notification\" in Epic in order to begin the standard process by which the radiology reading room liaison alerts the referring practitioner. Workstation performed: [id number]     IR IVC filter placement optional/temporary    Result Date: 4/10/2024  Narrative: PROCEDURE: Inferior vena cava filter placement STAFF: Shahriar Shen M.D. CONTRAST: 20 mL Omnipaque iv. FLUOROSCOPY TIME: 1.9 minutes. " NUMBER OF IMAGES: Multiple. COMPLICATIONS: None. MEDICATIONS: None. INDICATION: Posterior tibial vein DVT. The patient has a patent foramen ovale with embolic strokes. There is concern for clot propagation. She has hemorrhagic gastritis precluding anticoagulation. PROCEDURE: Via a right femoral venous approach, a diagnostic inferior vena cavagram was obtained to evaluate for caval thrombus, renal vein anatomy/anomalies and location, caval anatomy/anomalies and diameter. Following the diagnostic study, a Hannah inferior vena cava filter was deployed one centimeter below the lowest renal vein. Post placement radiograph demonstrated good position. The sheath was removed and compression applied to the puncture site until hemostasis was achieved. FINDINGS: 1.  Real-time ultrasound showed the right femoral vein to be anechoic and freely compressible. 2.  Diagnostic inferior vena cavagram showed no caval or renal vein anomalies, no caval thrombus, and a caval diameter under 28 mm. 3.  Final fluoroscopic image of the inferior vena cava filter to be in good position.     Impression: Placement of a temporary inferior vena cava filter. PLAN: This filter can be removed at any time in the future. Interventional Radiology will follow this patient to ensure removal when it is no longer needed. Workstation performed: [id number]     US right upper quadrant    Result Date: 4/10/2024  Narrative: RIGHT UPPER QUADRANT ULTRASOUND INDICATION: concern for choledocholithiasis. COMPARISON: CT from 4/9/2024 TECHNIQUE: Real-time ultrasound of the right upper quadrant was performed with a curvilinear transducer with both volumetric sweeps and still imaging techniques. FINDINGS: PANCREAS: Visualized portions of the pancreas are within normal limits. AORTA AND IVC: Visualized portions are normal for patient age. LIVER: Size: Within normal range. The liver measures 16.9 cm in the midclavicular line. Contour: Surface contour is smooth. Parenchyma:  Echogenicity and echotexture are within normal limits. No liver mass identified. Limited imaging of the main portal vein shows it to be patent and hepatopetal. BILIARY: Gallbladder sludge is noted without shadowing stone identified. Mild gallbladder wall thickening. No sonographic Bains sign. No intrahepatic biliary dilatation. CBD measures 3.0 mm. No choledocholithiasis. KIDNEY: Right kidney measures 12.7 x 6.3 x 5.0 cm. Volume 207.6 mL The lower pole of the right kidney is obscured by bowel gas. 8 mm shadowing calculus mid pole. 1.3 cm shadowing calculus lower pole. 2.7 cm simple cyst lower pole.. ASCITES: Small amount of perihepatic ascites..     Impression: The common bile duct is not dilated. Mild perihepatic ascites. Shadowing calculi in the right kidney Gallbladder sludge with wall thickening but no gallstones or sonographic Bains's sign wall thickening can be associated with hepatic pathology. Correlation with any right upper quadrant pain is advised.. Mild perihepatic ascites. The study was marked in EPIC for immediate notification.. Workstation performed: [id number]     CT head wo contrast    Result Date: 4/10/2024  Narrative: CT BRAIN - WITHOUT CONTRAST INDICATION:   r/o hemorrhagic conversion. COMPARISON: MRI from 4/6/2024 and CT from 4/5/2024. TECHNIQUE:  CT examination of the brain was performed.  Multiplanar 2D reformatted images were created from the source data. Radiation dose length product (DLP) for this visit:  860.98 mGy-cm .  This examination, like all CT scans performed in the [facility], was performed utilizing techniques to minimize radiation dose exposure, including the use of iterative  reconstruction and automated exposure control. IMAGE QUALITY: Study is limited by patient tilted in the gantry. FINDINGS: PARENCHYMA: Limited examination. In the interval since the prior examination there has been development of small focus of hemorrhage within the left parietal lobe on  series 3, image 28. This measures 0.8 x 0.8 cm. There also appears to be low-attenuation  involving the left frontoparietal and temporal lobes as well as occipital lobe, new since the prior study. No corresponding restricted diffusion in these regions on prior brain MRI. There are senescent calcifications within the basal ganglia bilaterally as well as cerebellar hemispheres. There is no midline shift. There is atherosclerotic intracranial calcification. Patient is status post left temporal lobectomy. VENTRICLES AND EXTRA-AXIAL SPACES: Ventricles and extra-axial CSF spaces are prominent commensurate with the degree of volume loss.  No hydrocephalus.  No acute extra-axial hemorrhage. VISUALIZED ORBITS: Normal visualized orbits. PARANASAL SINUSES: Normal visualized paranasal sinuses. CALVARIUM AND EXTRACRANIAL SOFT TISSUES: Status post left temporal craniotomy. Bilateral mastoid air cell effusions.     Impression: Interval development of a small amount of hemorrhage within the left parietal lobe since 4/5/2024. Low-attenuation involving the left cerebral hemisphere as described above appears new since prior MRI and CT. Findings may be technical secondary to patient tilt of the gantry versus secondary to a developing infarct. Repeat MRI is recommended for further evaluation. Remainder of the findings, as described above. I personally discussed this study with Dr. Medrano on 4/10/2024 8:51 AM. Workstation performed: [id number]     CT chest abdomen pelvis w contrast    Result Date: 4/9/2024  Narrative: CT CHEST, ABDOMEN AND PELVIS WITH IV CONTRAST INDICATION: r/o bleed. COMPARISON: Comparison is made to prior studies, the most recent CT scan is dated April 5, 2024. TECHNIQUE: CT examination of the chest, abdomen and pelvis was performed. Multiplanar 2D reformatted images were created from the source data. This examination, like all CT scans performed in the [facility], was performed utilizing techniques to  minimize radiation dose exposure, including the use of iterative reconstruction and automated exposure control. Radiation dose length product (DLP) for this visit: 750.02 mGy-cm IV Contrast: 100 mL of iohexol (OMNIPAQUE) Enteric Contrast: Not administered. FINDINGS: CHEST LUNGS: No significant change in bilateral groundglass opacities and areas of consolidation particularly in the lower lobes. Bibasilar bronchiectasis stable. Central airways are patent. Tracheostomy tube unchanged. PLEURA: Small right pleural effusion. HEART/GREAT VESSELS: Heart is unremarkable for patient's age. No thoracic aortic aneurysm. MEDIASTINUM AND KIRBY: Enteric tube in position, with the tip in the proximal gastric body. The sidehole is near the GE junction. CHEST WALL AND LOWER NECK: Heterogeneous enlarged right lobe of the thyroid. Status post left thyroidectomy. ABDOMEN LIVER/BILIARY TREE: Unremarkable. GALLBLADDER: No calcified gallstones. No pericholecystic inflammatory change. SPLEEN: Unremarkable. PANCREAS: Unremarkable. ADRENAL GLANDS: Unremarkable. KIDNEYS/URETERS: Bilateral renal cysts and multiple punctate calcifications throughout both kidneys suggestive of medullary nephrocalcinosis. STOMACH AND BOWEL: There is a rectal tube in place unchanged. Patient is status post ileocecectomy. There is a midline ileostomy in place. No evidence of bowel obstruction. APPENDIX: No findings to suggest appendicitis. ABDOMINOPELVIC CAVITY: High density free fluid in the cul-de-sac, likely small volume hemoperitoneum. Small volume ascites throughout the abdomen and pelvis similar to the prior exam. Small amount of presacral edema. VESSELS: IVC filter appropriately placed. PELVIS REPRODUCTIVE ORGANS: Unremarkable for patient's age. URINARY BLADDER: Collapsed around Ortiz catheter balloon. ABDOMINAL WALL/INGUINAL REGIONS: Wound VAC in the midline abdomen adjacent to the ileostomy with surrounding packing material. Moderate body wall edema. 6 x 1.8  x 5 cm hematoma in the anterior abdominal wall to the right of midline near the wound VAC. BONES: No acute fracture or suspicious osseous lesion.     Impression: Small volume hemoperitoneum in the cul-de-sac, and small anterior abdominal wall hematoma. No large hematoma in the chest abdomen or pelvis. Otherwise stable findings. Workstation performed: [id number]     XR chest portable ICU    Result Date: 4/9/2024  Narrative: XR CHEST PORTABLE ICU INDICATION: tachypnea. COMPARISON: April 4, 2024 FINDINGS: Examination performed in the somewhat rotated projection. Tracheostomy catheter in place. Right internal jugular central venous catheter tip over superior vena cava. Consolidative density throughout the left lung most dense in the left lung base. Small patchy consolidative density in the lateral right mid chest. Previously noted right chest consolidation appears improved from previous exam. No pneumothorax or pleural  effusion. Normal cardiomediastinal silhouette. Bones are unremarkable for age. Normal upper abdomen.     Impression: Multifocal consolidative opacities most dense over the left lung and left lower lung with small patchy density in the lateral right mid chest. Pulmonary parenchymal density appears decreased in the right mid chest. The appearance is suspicious for multifocal pneumonia. Continued radiographic follow-up recommended. Workstation performed: [id number]      VAS VENOUS DUPLEX - LOWER LIMB BILATERAL    Result Date: 4/7/2024  Narrative:  [facility] REPORT CLINICAL: Indications: Physician wants to determine patency of the venous system secondary to cardioembolic CVA with recent discovery of a patent foramen ovale. Operative History: 2024-04-07 SATURNINO Risk Factors The patient has history of CKD.  FINDINGS:  Right       Impression  PostTibial  Subacute       CONCLUSION: Impression: RIGHT LOWER LIMB: Evidence of subacute, occlusive deep vein thrombosis identified in the paired posterior tibial  veins. No evidence of superficial thrombophlebitis noted. Doppler evaluation shows a normal response to augmentation maneuvers.. Popliteal, posterior tibial and anterior tibial arterial Doppler waveform's are triphasic.  LEFT LOWER LIMB: No evidence of acute or chronic deep vein thrombosis. No evidence of superficial thrombophlebitis noted. Doppler evaluation shows a normal response to augmentation maneuvers. Popliteal, posterior tibial and anterior tibial arterial Doppler waveform's are triphasic.  Technical findings shared with Dr. Bowden, Dr. Lazcano and posted in Epic.  SIGNATURE: Electronically Signed by: ADA CRENSHAW on 2024-04-07 07:52:48 PM    Echo complete w/ contrast if indicated    Result Date: 4/7/2024  Narrative:   Left Ventricle: Left ventricular cavity size is normal. Wall thickness is mildly increased. There is mild concentric hypertrophy. The left ventricular ejection fraction is 70%. Systolic function is hyperdynamic.   Atrial Septum: There is  right to left shunting noted using saline contrast at rest and with provocation (abdominal compression) which may be related to a PFO or intrapulmonary shunt.   Aorta: The aortic root is mildly dilated. The aortic root is 4.20 cm.   Technically very difficult study.   No definite evidence of valvular vegetations on the study.     MRI brain wo contrast    Result Date: 4/6/2024  Narrative: MRI BRAIN WITHOUT CONTRAST INDICATION: persistent encephalopathy, uremia. COMPARISON:   Brain MRI 1/10/2024. TECHNIQUE:  Multiplanar, multisequence imaging of the brain was performed. IMAGE QUALITY:  Diagnostic. FINDINGS: BRAIN PARENCHYMA: Multiple small foci of acute infarcts involving bilateral frontal lobes, right parietal and occipital lobes, bilateral basal ganglia, and bilateral corona radiata. There is questionable tiny acute infarct in the left occipital lobe (series 3 image 14). There is questionable tiny acute infarct in the left cerebellum (series 3 image 11).  There is no significant edema or mass effect. No midline shift. There is partial lack of sulcal CSF suppression in the FLAIR sequence seen in supratentorial and infratentorial brain. Redemonstrated postsurgical change from prior partial left temporal lobe resection with mild caliectasis along the resection margin. There is no discrete mass. No acute intracranial hemorrhage. Stable focus of old microhemorrhage in the right periatrial white matter (series 7 image 17). VENTRICLES:  Normal for the patient's age. SELLA AND PITUITARY GLAND:  Normal. ORBITS:  Normal. PARANASAL SINUSES: Left greater than right mild maxillary sinus mucosal thickening. VASCULATURE:  Evaluation of the major intracranial vasculature demonstrates appropriate flow voids. CALVARIUM AND SKULL BASE:  Normal. Complete opacification of bilateral mastoid air cells and middle ears. EXTRACRANIAL SOFT TISSUES:  Normal.     Impression: 1.  Multiple small bilateral foci of acute infarcts as described suggesting embolic etiology. No significant edema, mass effect, or hemorrhagic transformation. 2.  Partial lack of sulcal CSF suppression in the FLAIR sequence noted in the supratentorial and infratentorial brain. This could be artifactual/technique related; however, inflammatory or infectious process is not excluded. Correlate with clinical assessment. 3.  Stable postoperative appearance from prior partial left temporal lobe resection. 4.  Complete opacification of bilateral mastoids air cells and middle ears. The study was marked in EPIC for immediate notification. Workstation performed: [id number]      thyroid    Result Date: 4/5/2024  Narrative: THYROID ULTRASOUND INDICATION: thyroid enlargement, collapsing of airway -- recommended per radiologist. COMPARISON: CT chest abdomen pelvis 4/5/2024 TECHNIQUE: Ultrasound of the thyroid was performed with a high frequency linear transducer in transverse and sagittal planes including volumetric imaging sweeps as  well as traditional still imaging technique. FINDINGS: Limited exam due to overlying tracheostomy tube and central line catheter on the right. Thyroid texture: Heterogeneous thyroid echotexture. Lobular contour. Vascularity within normal limits. Right lobe: 4.6 x 2.2 x 1.8 cm. Volume 8.8 mL Left lobe: Prior left lobectomy. Isthmus: 1.1 cm. No discrete thyroid nodules are demonstrated.     Impression: 1. Limited exam due to overlying tracheostomy tube and central line catheter. Heterogeneous lobular appearance to the remaining thyroid gland, nonspecific, question sequela of thyroiditis. No discrete nodule identified. Reference: ACR Thyroid Imaging, Reporting and Data System (TI-RADS): White Paper of the ACR TI-RADS Committee. J AM Maria G Radiol 2017;14:587-595. Additional recommendations based on American Thyroid Association 2015 guidelines. Workstation performed: [id number]     CT chest abdomen pelvis w contrast    Result Date: 4/5/2024  Narrative: CT CHEST, ABDOMEN AND PELVIS WITH IV CONTRAST INDICATION: Encephalopathy, bandemia, hematuria, s/p abdominal surgery, pneumonitis. COMPARISON: CT abdomen/pelvis 4/1/2024. CT chest 1/31/2024. AP chest from yesterday. TECHNIQUE: CT examination of the chest, abdomen and pelvis was performed. Multiplanar 2D reformatted images were created from the source data. This examination, like all CT scans performed in the [facility], was performed utilizing techniques to minimize radiation dose exposure, including the use of iterative reconstruction and automated exposure control. Radiation dose length product (DLP) for this visit: 1318.8 mGy-cm IV Contrast: 100 mL of iohexol (OMNIPAQUE) Enteric Contrast: Not administered. FINDINGS: CHEST LUNGS: Persistent bilateral groundglass and consolidative opacities throughout both lungs most prominent in the lower lobes improved from 1/31/2024. Unchanged bilateral lower lobe bronchiectasis. No endotracheal or endobronchial  lesion. Tracheostomy tube tip located above the tracee. PLEURA: New small right pleural effusion. HEART/GREAT VESSELS: Heart is unremarkable for patient's age. No thoracic aortic aneurysm. MEDIASTINUM AND KIRBY: Unremarkable. CHEST WALL AND LOWER NECK: Status post left thyroidectomy. Heterogeneous multinodular right thyroid. ABDOMEN LIVER/BILIARY TREE: Unremarkable. GALLBLADDER: Vicarious excretion of contrast material. Pericholecystic fluid likely on the basis of perihepatic ascites. No wall thickening. SPLEEN: Unremarkable. PANCREAS: Unremarkable. ADRENAL GLANDS: Subcentimeter left adrenal nodularity does not need further follow-up. KIDNEYS/URETERS: No hydronephrosis. Innumerable bilateral cyst unchanged from prior studies. Innumerable punctate medullary calcifications/medullary nephrocalcinosis. 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 STOMACH AND BOWEL: Enteric tube tip is located in the gastric lumen. Rectal tube is present. Status post ileocecectomy. Midline ileostomy as described above with surrounding wound with subcutaneous debris/packing material. No bowel obstruction. APPENDIX: No findings to suggest appendicitis. ABDOMINOPELVIC CAVITY: Trace ascites. No pneumoperitoneum. No lymphadenopathy. VESSELS: Unremarkable for patient's age. PELVIS REPRODUCTIVE ORGANS: Unremarkable for patient's age. URINARY BLADDER: Decompressed with a Ortiz catheter. ABDOMINAL WALL/INGUINAL REGIONS: Midline ileostomy as described above with surrounding wound with subcutaneous debris/packing material. BONES: No acute fracture or suspicious osseous lesion.     Impression: Improved but persistent groundglass and consolidative bilateral pulmonary opacities. No new intra-abdominal or intrapelvic findings. Workstation performed: [id number]     CT head wo contrast    Result Date: 4/5/2024  Narrative: CT BRAIN - WITHOUT CONTRAST INDICATION:   Encephalopathy, bandemia, hematuria, s/p abdominal surgery, pneumonitis.  COMPARISON: CT head without contrast 3/31/2024, 3/13/2024, 3/9/2024, 1/7/2024. MRI brain seizure with and without contrast 1/10/2024. MRI brain without contrast 1/8/2024. CTA head and neck with and without contrast 1/7/2024 TECHNIQUE:  CT examination of the brain was performed.  Multiplanar 2D reformatted images were created from the source data. Radiation dose length product (DLP) for this visit:  931.37 mGy-cm .  This examination, like all CT scans performed in the [facility], was performed utilizing techniques to minimize radiation dose exposure, including the use of iterative  reconstruction and automated exposure control. IMAGE QUALITY:  Diagnostic. FINDINGS: PARENCHYMA AND EXTRA-AXIAL SPACES: Decreased attenuation is noted in periventricular and subcortical white matter demonstrating an appearance that is statistically most likely to represent mild microangiopathic change, similar to prior exam. Unchanged postsurgical changes of left partial temporal lobectomy with large cystic resection cavity. No CT signs of acute infarction given limitations of beam-hardening streak artifact and posterior fossa.  No intracranial mass, mass effect or midline shift.  No acute parenchymal hemorrhage. Unchanged symmetric dense calcifications in bilateral medial basal ganglia and bilateral cerebellum. Unchanged faint calcifications in bilateral frontal cortex. VENTRICLES :  Normal for the patient's age. VISUALIZED ORBITS: Normal visualized orbits. PARANASAL SINUSES: Small amount of frothy secretions in right sphenoid sinus. Small left maxillary mucous retention cyst. Partially imaged right nasoenteric tube. CALVARIUM AND EXTRACRANIAL SOFT TISSUES: Left temporal craniotomy with bone flap cranioplasty and microplate screw fixation. Large bilateral mastoid and bilateral middle ear effusions, worsened on right side.     Impression: No acute intracranial abnormality status post left partial temporal lobectomy given  limitations of beam-hardening streak artifact in posterior fossa. Similar mild chronic microangiopathy. Large bilateral mastoid and bilateral middle ear effusions, worsened on right side. Workstation performed: [id number]     XR chest portable ICU    Result Date: 4/4/2024  Narrative: XR CHEST PORTABLE ICU INDICATION: HD line confirmation. COMPARISON: Radiograph from 4/4/2024 FINDINGS: Tracheostomy tube. Enteric tube courses inferiorly along the expected course of the esophagus, tip below the level of the hemidiaphragms. Right IJ catheter tip overlying the lower SVC. Stable moderate diffuse hazy bilateral airspace opacities. Bibasilar atelectasis. No pneumothorax or pleural effusion. Normal cardiomediastinal silhouette. Bones are unremarkable for age. Normal upper abdomen.     Impression: 1. Interval placement of right IJ catheter tip overlying the lower SVC. Other tubes/lines stable. 2. Similar appearance of moderate diffuse bilateral infectious/inflammatory airspace opacities. Workstation performed: [id number]     VAS upper limb venous duplex scan, unilateral/limited    Result Date: 4/4/2024  Narrative:  [facility] REPORT CLINICAL: Indications: Patient presents with left upper extremity edema. Patient is currently on a ventilator. Operative History: No prior cardiovascular surgeries Risk Factors The patient has history of CKD.  FINDINGS:  Left                       Thrombus           Impression      Innominate Vein                               Not Visualized  Ceph Upper                 Acute - Occlusive                  Ceph Mid Arm               Acute - Occlusive                  Cephalic Upper Arm Distal  Acute - Occlusive                  Ceph AC                    Acute - Occlusive                     CONCLUSION:  Impression  RIGHT UPPER LIMB LIMITED: Unable to evaluate right neck vessels due to positioning and medical straps.  LEFT UPPER LIMB: No evidence of acute or chronic deep vein thrombosis.  Innominate vein not visualized due to straps. There is evidence of acute occlusive superficial thrombophlebitis in the cephalic vein from the elbow to proximal upper arm. Doppler evaluation shows a normal response to augmentation maneuvers.  Technical findings given via tiger text to Dr. Bowden.  SIGNATURE: Electronically Signed by: HANNAH BRENNER MD, RPVI on 2024-04-04 11:26:01 AM    EGD    Result Date: 4/4/2024  Narrative: Table formatting from the original result was not included. [facility] [address] [facility] [address] [phone] DATE OF SERVICE: 4/03/24 PHYSICIAN(S): Attending: No Staff Documented Fellow: No Staff Documented INDICATION: Hematemesis POST-OP DIAGNOSIS: See the impression below. PREPROCEDURE: Informed consent was obtained for the procedure, including sedation.  Risks of perforation, hemorrhage, adverse drug reaction and aspiration were discussed. The patient was placed in the left lateral decubitus position. Patient was explained about the risks and benefits of the procedure. Risks including but not limited to bleeding, infection, and perforation were explained in detail. Also explained about less than 100% sensitivity with the exam and other alternatives. PROCEDURE: EGD DETAILS OF PROCEDURE: Patient was taken to the procedure room where a time out was performed to confirm correct patient and correct procedure. The patient underwent monitored anesthesia care, which was administered by an anesthesia professional. The patient's blood pressure, heart rate, level of consciousness, respirations, oxygen, ECG and ETCO2 were monitored throughout the procedure. The scope was introduced through the mouth and advanced to the second part of the duodenum. Retroflexion was performed in the fundus. Prior to the procedure, the patient's H. Pylori status was unknown. The patient experienced no blood loss. The procedure was not difficult. The patient tolerated the procedure well.  There were no apparent adverse events. ANESTHESIA INFORMATION: ASA: ASA status cannot be found on the log. Anesthesia Type: Anesthesia type cannot be found on the log. MEDICATIONS: Totals unavailable because the procedure time range is not set FINDINGS: Localized ulcerated mucosa in the upper third of the esophagus (15 cm from the incisors) Irregular Z-line 44 cm from the incisors Moderate esophagitis Blood clotting in the body of the stomach and antrum The duodenal bulb and 2nd part of the duodenum appeared normal. A nasogastric tube was placed in the body of the stomach SPECIMENS: * Cannot find log *     Impression: Ulcerated mucosa in the upper third of the esophagus without evidence of hemorrhage Mild esophagitis Blood clotting in the body of the stomach and antrum again limiting visualization, but without worsening from previous exam The duodenal bulb and 2nd part of the duodenum appeared normal. RECOMMENDATION:  NG tube placed, ok to use for meds, tube feeds Continue PPI iv bid Monitor NG tube output, stools Trend Hb Follow up previous path    No Staff Documented     XR chest portable ICU    Result Date: 4/4/2024  Narrative: XR CHEST PORTABLE ICU INDICATION: Worsening clinical status, vent dependent. COMPARISON: 4/1/2024 FINDINGS: Tracheotomy tube positioning unchanged. Nasogastric tube tip and gastric fundus and sideport in region of esophagogastric junction. There is persistent airspace disease within the right midlung field, now with hazy left lung  opacities as well. No pneumothorax or pleural effusion. Normal cardiomediastinal silhouette. Bones are unremarkable for age. Normal upper abdomen.     Impression: 1. Stable appearance of right lung infiltrate, now with developing hazy airspace opacities in the left lung 2. Nasogastric tube tip in the gastric fundus and sideport at the esophagogastric junction. May consider advancement to assure sideport is below the EG junction. The examination demonstrates a  significant  finding and was documented as such in [facility] for liaison and referring practitioner notification. Workstation performed: [id number]     EGD    Result Date: 4/2/2024  Narrative: Table formatting from the original result was not included. [facility] [address] [place] [address] [phone] DATE OF SERVICE: 4/02/24 PHYSICIAN(S): Attending: Alireza Lea MD Fellow: Shahriar Bernard MD INDICATION: Hemorrhagic shock (HCC) POST-OP DIAGNOSIS: See the impression below. PREPROCEDURE: Informed consent was obtained for the procedure, including sedation.  Risks of perforation, hemorrhage, adverse drug reaction and aspiration were discussed. The patient was placed in the left lateral decubitus position. Patient was explained about the risks and benefits of the procedure. Risks including but not limited to bleeding, infection, and perforation were explained in detail. Also explained about less than 100% sensitivity with the exam and other alternatives. PROCEDURE: EGD DETAILS OF PROCEDURE: Patient was taken to the procedure room where a time out was performed to confirm correct patient and correct procedure. The patient underwent monitored anesthesia care, which was administered by an intensivist. The patient's blood pressure, heart rate, level of consciousness, respirations, oxygen, ECG and ETCO2 were monitored throughout the procedure. The scope was introduced through the mouth and advanced to the second part of the duodenum. Retroflexion was performed in the fundus. Prior to the procedure, the patient's H. Pylori status was unknown. The patient experienced no blood loss. The procedure was not difficult. The patient tolerated the procedure well. There were no apparent adverse events. ANESTHESIA INFORMATION: ASA: ASA status not filed in the log. Anesthesia Type: Anesthesia type not filed in the log. MEDICATIONS: levalbuterol (XOPENEX) inhalation solution 1.25 mg 1.25 mg sodium  chloride 3 % inhalation solution 4 mL 4 mL (Totals for administrations occurring from 1315 to 1407 on 04/02/24) FINDINGS: Ulcerated mucosa in the upper third of the esophagus (15 cm from the incisors); there was stigmata of recent hemorrhage, and bleeding occurred before intervention. 2 focal tears were visualized in the proximal and mid esophagus measures 1 cm each with large clot which was removed. The proximal location has mild oozing but in setting of thrombocytopenia and location this was not treated Significant amount of blood clotting in the esophagus and stomach, which obscured visualization. All old blood and clots. This was removed from the esophagus Z-line 43 cm from the incisors The duodenal bulb and 2nd part of the duodenum appeared normal. Multiple small, superficial, round, benign-appearing ulcers in the cardia, fundus of the stomach and body of the stomach with clean base (Zaki III); performed cold forceps biopsy SPECIMENS: ID Type Source Tests Collected by Time Destination 1 : Rule out CMV, HSV and candida Tissue Stomach CMV CULTURE, TISSUE EXAM Alireza Lea MD 4/2/2024  2:00 PM      Impression: Ulcerated esophageal tear 15 cm from incisors with slow oozing initially after clot clearance with cessation of hemorrhage at end of exam Blood and extensive clot present in the esophagus and stomach The duodenal bulb and 2nd part of the duodenum appeared normal. Multiple, small ulcers and trauma throughout stomach with clean base (Zaki III); performed cold forceps biopsy RECOMMENDATION:  Await pathology results  Await pathology results  Continue PPI iv bid Give Reglan 10 mg iv q8h Hold on further enteral access for 24 hrs Will plan for repeat EGD tomorrow to eval the areas of tear Special specimen send for HSV, CMV and candidiasis NG tube trauma (difficult placement) possible cause for esophageal superficial tear Erosive gastritis likely due to high steroids, NG suction trauma vs. Possible infectious  etiology in setting of immunosuppressive therapy    Alireza Lea MD     CT chest w contrast    Addendum Date: 4/1/2024 Addendum:   ADDENDUM: Please note the findings described in the body of an enlarged heterogeneous right thyroid lobe is new from recent CT chest performed 3 weeks ago. Given the new tracheostomy this most likely is procedure related possibly hemorrhage. Ultrasound characterization may be obtained in a few weeks. I personally discussed this study with Moises Bowden on 4/1/2024 5:54 PM. .    Result Date: 4/1/2024  Narrative: CT CHEST WITH IV CONTRAST INDICATION: bilateral opacities, tachypnea. COMPARISON: Compared with 3/10/2024 TECHNIQUE: CT examination of the chest was performed. Multiplanar 2D reformatted images were created from the source data. This examination, like all CT scans performed in the [facility], was performed utilizing techniques to minimize radiation dose exposure, including the use of iterative reconstruction and automated exposure control. Radiation dose length product (DLP) for this visit: 436.63 mGy-cm IV Contrast: 85 mL of iohexol (OMNIPAQUE) FINDINGS: LUNGS: Bilateral parenchymal groundglass haziness with focal areas of consolidation like change new in the right upper lobe peripherally and in the lower lobes bilaterally posteriorly. Breathing motion artifact limits evaluation of bronchiectatic changes  in the lower lobes bilaterally.. There is no tracheal or endobronchial lesion. PLEURA: Unremarkable. HEART/GREAT VESSELS: Heart is unremarkable for patient's age. No thoracic aortic aneurysm. MEDIASTINUM AND KIRYB: Unremarkable. CHEST WALL AND LOWER NECK: Enlarged heterogeneous right thyroid lobe. Left lobe not seen. Tracheostomy tube in place. VISUALIZED STRUCTURES IN THE UPPER ABDOMEN: Nasogastric tube in the stomach with fluid. OSSEOUS STRUCTURES: No acute fracture or destructive osseous lesion.     Impression: Bilateral parenchymal infiltrative changes with  consolidation like focus in the right upper lobe is new. Relative worsening. Workstation performed: [id number]     Echo follow up/limited w/ contrast if indicated    Result Date: 4/1/2024  Narrative:   Left Ventricle: Left ventricular cavity size is normal. Wall thickness is mildly increased. There is concentric remodeling. The left ventricular ejection fraction is 70%. Systolic function is hyperdynamic. Wall motion is normal.   Right Ventricle: Right ventricular cavity size is normal. Systolic function is hyperdynamic.   Mitral Valve: There is trace regurgitation.   Tricuspid Valve: There is trace regurgitation.   Prior TTE study available for comparison. Prior study date: 3/17/2024. No significant changes noted compared to the prior study.     CT abdomen pelvis w contrast    Result Date: 4/1/2024  Narrative: CT ABDOMEN AND PELVIS WITH IV CONTRAST INDICATION: blood clots in urine, currently doing cbi. COMPARISON: CT chest/abdomen/pelvis dated 3/10/2024. TECHNIQUE: CT examination of the abdomen and pelvis was performed. Multiplanar 2D reformatted images were created from the source data. This examination, like all CT scans performed in the [facility], was performed utilizing techniques to minimize radiation dose exposure, including the use of iterative reconstruction and automated exposure control. Radiation dose length product (DLP) for this visit: 705.77 mGy-cm IV Contrast: 100 mL of iohexol (OMNIPAQUE) Enteric Contrast: Not administered. FINDINGS: ABDOMEN LOWER CHEST: Persistent bilateral lower lobe airspace consolidation. LIVER/BILIARY TREE: Unremarkable. GALLBLADDER: No calcified gallstones. No pericholecystic inflammatory change. SPLEEN: Unremarkable. PANCREAS: Unremarkable. ADRENAL GLANDS: Unremarkable. KIDNEYS/URETERS: Numerous bilateral renal cysts measuring up to 2.3 cm on the left and other subcentimeter hypodensities which are too small to characterize. Numerous bilateral renal  calculi/calcifications redemonstrated probably due to medullary sponge kidney. No hydronephrosis. STOMACH AND BOWEL: No evidence for bowel obstruction. The patient is again noted to be status post ileocolectomy with right lower quadrant ileostomy. Associated edema and small foci of subcutaneous emphysema consistent with postsurgical change are not significantly changed. No focal drainable fluid collection is identified. APPENDIX: No findings to suggest appendicitis. ABDOMINOPELVIC CAVITY: Trace ascites. No pneumoperitoneum. No lymphadenopathy. VESSELS: Unremarkable for patient's age. PELVIS REPRODUCTIVE ORGANS: Unremarkable for patient's age. URINARY BLADDER: Ortiz catheter within the urinary bladder. ABDOMINAL WALL/INGUINAL REGIONS: Unremarkable. BONES: No acute fracture or suspicious osseous lesion.     Impression: Stable postsurgical changes status post ileocolectomy with right lower quadrant ileostomy with persistent associated inflammatory changes and subcutaneous emphysema. No focal drainable fluid collection identified. Trace ascites. Persistent bilateral lower lobe atelectasis. Workstation performed: [id number]     XR chest portable    Result Date: 4/1/2024  Narrative: XR CHEST PORTABLE INDICATION: AMS, possible aspiration. COMPARISON: Chest radiograph and CT of the chest 3/31/2024. FINDINGS: Tracheostomy in unchanged position. NG tube tip below the level of the left hemidiaphragm, extending inferior outside of the field-of-view. When compared to chest radiograph 3/31/2024, there is improvement in the previously described groundglass opacities in the left lung. The previously mentioned focal consolidation in the right midlung zone has not significantly changed. No pneumothorax or  pleural effusion. Normal cardiomediastinal silhouette. Bones are unremarkable for age. Normal upper abdomen.     Impression: Focal consolidation in the right middle lung zone is approximately the same when compared to 3/31/2024,  concerning for pneumonia. Improvement in background groundglass consolidations in the left lung when compared to 3/31/2024. Resident: LOUISE LEUNG I, the attending radiologist, have reviewed the images and agree with the final report above. Workstation performed: [id number]     XR chest portable ICU    Result Date: 3/31/2024  Narrative: XR CHEST PORTABLE ICU INDICATION: tachypnea. COMPARISON: CXR 3/29/2024 and 3/22/2024, chest CT 3/31/2024. FINDINGS: Tracheostomy projecting over the trachea. NG tube below the diaphragm. Persistent groundglass opacity with new consolidation in the right midlung. No pneumothorax or pleural effusion. Normal cardiomediastinal silhouette. Bones are unremarkable for age. Normal upper abdomen.     Impression: Persistent groundglass opacity with new consolidation in the right midlung. See chest CT for further evaluation. Workstation performed: [id number]     CT head wo contrast    Result Date: 3/31/2024  Narrative: CT BRAIN - WITHOUT CONTRAST INDICATION:   AMS. COMPARISON: 3/13/2024 TECHNIQUE:  CT examination of the brain was performed.  Multiplanar 2D reformatted images were created from the source data. Radiation dose length product (DLP) for this visit:  856.42 mGy-cm .  This examination, like all CT scans performed in the [facility], was performed utilizing techniques to minimize radiation dose exposure, including the use of iterative  reconstruction and automated exposure control. IMAGE QUALITY:  Diagnostic. FINDINGS: PARENCHYMA: Cystic encephalomalacia and gliosis in the left middle cranial fossa in the left temporal pole subjacent to a pterional craniotomy redemonstrated indicative of left temporal lobectomy. Stable basal ganglia calcifications are bilaterally symmetric. Mild periventricular hypodensities noted. There are also bilateral cerebellar calcifications in the region of the dentate nuclei. VENTRICLES AND EXTRA-AXIAL SPACES: Stable. No hydrocephalus.  VISUALIZED ORBITS: Conjugate gaze to the right. PARANASAL SINUSES: Mucosal thickening CALVARIUM AND EXTRACRANIAL SOFT TISSUES: Left pterional craniotomy again noted     Impression: 1. No acute intracranial hemorrhage, mass effect or edema. 2. 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 3. 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 Differential diagnosis could include treatment related changes versus other etiologies.. Workstation performed: [id number]     XR chest portable ICU    Result Date: 3/30/2024  Narrative: XR CHEST PORTABLE ICU INDICATION: tachypnea. COMPARISON: CXR 3/22/2024, chest CT 3/10/2024. FINDINGS: Tracheostomy. NG tube below the diaphragm. Worsening bilateral groundglass opacity. No pneumothorax or pleural effusion. Normal cardiomediastinal silhouette. Bones are unremarkable for age. Persistent mild elevation of the right diaphragm.     Impression: Worsening bilateral groundglass opacity. Workstation performed: [id number]     FL barium swallow video w speech    Result Date: 3/27/2024  Narrative: A video barium swallow study was performed by the Department of Speech Pathology. Please refer to the report for the official interpretation. The images are stored for archival purposes only. Study images were not formally reviewed by the Radiology Department.    VAS upper limb venous duplex scan, unilateral/limited    Result Date: 3/24/2024  Narrative:  [facility] REPORT CLINICAL: Indications: Patient presents with left upper extremity edema x few days. Risk Factors The patient has history of CKD.  FINDINGS:  Left                       Thrombus           Cephalic Upper Arm Distal  Acute - Occlusive  Cep AC                    Acute - Occlusive     CONCLUSION:  Impression RIGHT UPPER LIMB LIMITED: Unable to evaluate the neck veins due to medical devises.  LEFT UPPER LIMB: No evidence of acute or chronic  deep vein thrombosis. Unable to evaluate IJV and innominate vein due medical devises. There is evidence of acute occlusive superficial thrombophlebitis noted in the cephalic vein at the elbow and distal upper arm. Doppler evaluation shows a normal response to augmentation maneuvers.  Technical findings were given to Dr. Sindi Recinos.  SIGNATURE: Electronically Signed by: ABIMAEL GONZALES DO on 2024-03-24 09:56:59 PM    XR chest portable    Result Date: 3/22/2024  Narrative: XR CHEST PORTABLE INDICATION: hypoxia. COMPARISON: Compared with 3/20/2024 FINDINGS: Tracheostomy tube. Feeding tube in the stomach. Mild prominent interstitial markings. No pneumothorax or pleural effusion. Normal cardiomediastinal silhouette. Bones are unremarkable for age. Normal upper abdomen.     Impression: Mild congestive changes. Workstation performed: [id number]     XR chest portable ICU    Result Date: 3/20/2024  Narrative: XR CHEST PORTABLE ICU INDICATION: f/u pneumonia. COMPARISON: Radiograph March 14, 2024 FINDINGS: Tracheostomy tube in place. Enteric tube coursing into the stomach. Multifocal patchy airspace opacities in a bronchiolar distribution, particularly in the lower lobes, overall slightly improved. No pneumothorax or pleural effusion. Normal cardiomediastinal silhouette. Bones are unremarkable for age. Normal upper abdomen.     Impression: Overall mild improvement in multifocal bronchopneumonia, likely aspiration related. Workstation performed: [id number]     Bronchoscopy    Result Date: 3/17/2024  Narrative: Table formatting from the original result was not included. [facility] [address] [facility] [address] [phone] DATE OF SERVICE: 3/17/24 PHYSICIAN(S): Attending: Jarett Martins MD Fellow: Miguel Interiano MD INDICATION: Pneumonia of both lungs due to infectious organism, unspecified part of lung POST-OP DIAGNOSIS: See the impression below. PREPROCEDURE: Standard airway preparation completed per  respiratory therapy protocol.  Informed consent was obtained. Images reviewed prior to the procedure.  A Time Out was performed. No suspicion or identified risk for TB or other airborne infectious disease; bronchoscopy procedure being performed for diagnostic purposes. PROCEDURE: Bronchoscopy DETAILS OF PROCEDURE: Bronchoscope was brought to patient's bedside where a time out was performed to confirm correct patient and correct procedure. The patient was already under sedation prior to the procedure. The patient's blood pressure, heart rate, oxygen and respirations were monitored throughout the procedure. The patient experienced no blood loss. The scope was introduced through the tracheostomy. The procedure was not difficult. The patient tolerated the procedure well. There were no apparent adverse events. ANESTHESIA INFORMATION: ASA: ASA status cannot be found on the log. Anesthesia Type: Anesthesia type cannot be found on the log. FINDINGS: The upper trachea, middle trachea, lower trachea and main tracee appeared normal. Moderate, generalized erythematous and friable mucosa in the left main stem, MANDY, LLL, right main stem, RUL, bronchus intermedius, RML and RLL Moderate, thick and purulent secretions present in the left main stem, MANDY, lingula, LLL, right main stem, RUL, bronchus intermedius, RML and RLL; secretions were easily removed by therapeutic aspiration and the airway was cleared SPECIMENS: No sample collected      Impression: Erythematous and friable airways Moderate amount of purulent secretions throughout RECOMMENDATION:  Continue with hypertonic saline nebs and as needed bronchoscopy for airway clearance  I supervised and was present for the entire procedure Jarett Martins MD [facility]     Echo follow up/limited w/ contrast if indicated    Result Date: 3/17/2024  Narrative:   Left Ventricle: The left ventricular ejection fraction is 70%. Systolic function is  vigorous. Global longitudinal strain is reduced at -14%. Wall motion is normal.   Aortic Valve: There is mild regurgitation.   Aorta: The aortic root is mildly dilated. The ascending aorta is mildly dilated. The aortic root is 4.10 cm. The ascending aorta is 4.1 cm.       EKG, Pathology, and Other Studies Reviewed on Admission:   EKG: Reviewed      Counseling / Coordination of Care Time: 30 total mins spent n consult. Greater than 50% of total time spent on patient counseling and coordination of care.    Karlene Millard DO  Gastroenterology Fellow  [facility]  Division of Gastroenterology and Hepatology  Available on [email or web address]  ...............................................................................................................................................  ** Please Note: This note is constructed using a voice recognition dictation system. **

## 2024-04-15 NOTE — PLAN OF CARE
Problem: Prexisting or High Potential for Compromised Skin Integrity  Goal: Skin integrity is maintained or improved  Description: INTERVENTIONS:  - Identify patients at risk for skin breakdown  - Assess and monitor skin integrity  - Assess and monitor nutrition and hydration status  - Monitor labs   - Assess for incontinence   - Turn and reposition patient  - Assist with mobility/ambulation  - Relieve pressure over bony prominences  - Avoid friction and shearing  - Provide appropriate hygiene as needed including keeping skin clean and dry  - Evaluate need for skin moisturizer/barrier cream  - Collaborate with interdisciplinary team   - Patient/family teaching  - Consider wound care consult   Outcome: Progressing     Problem: Nutrition/Hydration-ADULT  Goal: Nutrient/Hydration intake appropriate for improving, restoring or maintaining nutritional needs  Description: Monitor and assess patient's nutrition/hydration status for malnutrition. Collaborate with interdisciplinary team and initiate plan and interventions as ordered.  Monitor patient's weight and dietary intake as ordered or per policy. Utilize nutrition screening tool and intervene as necessary. Determine patient's food preferences and provide high-protein, high-caloric foods as appropriate.     INTERVENTIONS:  - Monitor oral intake, urinary output, labs, and treatment plans  - Assess nutrition and hydration status and recommend course of action  - Evaluate amount of meals eaten  - Assist patient with eating if necessary   - Allow adequate time for meals  - Recommend/ encourage appropriate diets, oral nutritional supplements, and vitamin/mineral supplements  - Order, calculate, and assess calorie counts as needed  - Recommend, monitor, and adjust tube feedings and TPN/PPN based on assessed needs  - Assess need for intravenous fluids  - Provide specific nutrition/hydration education as appropriate  - Include patient/family/caregiver in decisions related to  nutrition  Outcome: Progressing     Problem: INFECTION - ADULT  Goal: Absence or prevention of progression during hospitalization  Description: INTERVENTIONS:  - Assess and monitor for signs and symptoms of infection  - Monitor lab/diagnostic results  - Monitor all insertion sites, i.e. indwelling lines, tubes, and drains  - Monitor endotracheal if appropriate and nasal secretions for changes in amount and color  - [place] appropriate cooling/warming therapies per order  - Administer medications as ordered  - Instruct and encourage patient and family to use good hand hygiene technique  - Identify and instruct in appropriate isolation precautions for identified infection/condition  Outcome: Progressing     Problem: SAFETY ADULT  Goal: Patient will remain free of falls  Description: INTERVENTIONS:  - Educate patient/family on patient safety including physical limitations  - Instruct patient to call for assistance with activity   - Consult OT/PT to assist with strengthening/mobility   - Keep Call bell within reach  - Keep bed low and locked with side rails adjusted as appropriate  - Keep care items and personal belongings within reach  - Initiate and maintain comfort rounds  - Make Fall Risk Sign visible to staff  - Offer Toileting every 2 Hours, in advance of need  - Initiate/Maintain bed alarm  - Obtain necessary fall risk management equipment: non skid footwear  - Apply yellow socks and bracelet for high fall risk patients  - Consider moving patient to room near nurses station  Outcome: Progressing     Problem: RESPIRATORY - ADULT  Goal: Achieves optimal ventilation and oxygenation  Description: INTERVENTIONS:  - Assess for changes in respiratory status  - Assess for changes in mentation and behavior  - Position to facilitate oxygenation and minimize respiratory effort  - Oxygen administered by appropriate delivery if ordered  - Initiate smoking cessation education as indicated  - Encourage broncho-pulmonary  hygiene including cough, deep breathe, Incentive Spirometry  - Assess the need for suctioning and aspirate as needed  - Assess and instruct to report SOB or any respiratory difficulty  - Respiratory Therapy support as indicated  Outcome: Progressing     Problem: SKIN/TISSUE INTEGRITY - ADULT  Goal: Skin Integrity remains intact(Skin Breakdown Prevention)  Description: Assess:  -Perform Flavio assessment every shift   -Clean and moisturize skin every day   -Inspect skin when repositioning, toileting, and assisting with ADLS  -Assess under medical devices such as ronny  every hour   -Assess extremities for adequate circulation and sensation     Bed Management:  -Have minimal linens on bed & keep smooth, unwrinkled  -Change linens as needed when moist or perspiring  -Avoid sitting or lying in one position for more than 2 hours while in bed  -Keep HOB at 45 degrees     Toileting:  -Offer bedside commode  -Assess for incontinence every hour  -Use incontinent care products after each incontinent episode such as moisture barrier cream     Activity:  -Mobilize patient 3 times a day  -Encourage activity and walks on unit  -Encourage or provide ROM exercises   -Turn and reposition patient every 2 Hours  -Use appropriate equipment to lift or move patient in bed  -Instruct/ Assist with weight shifting every hour when out of bed in chair  -Consider limitation of chair time 2 hour intervals    Skin Care:  -Avoid use of baby powder, tape, friction and shearing, hot water or constrictive clothing  -Relieve pressure over bony prominences using allevyn   -Do not massage red bony areas    Next Steps:  -Teach patient strategies to minimize risks such as weight shifting    -Consider consults to  interdisciplinary teams such as PT  Outcome: Progressing  Goal: Incision(s), wounds(s) or drain site(s) healing without S/S of infection  Description: INTERVENTIONS  - Assess and document dressing, incision, wound bed, drain sites and  surrounding tissue  - Provide patient and family education  Outcome: Progressing  Goal: Pressure injury heals and does not worsen  Description: Interventions:  - Implement low air loss mattress or specialty surface (Criteria met)  - Apply silicone foam dressing  - Apply fecal or urinary incontinence containment device   - Utilize friction reducing device or surface for transfers   - Consider nutrition services referral as needed  Outcome: Progressing     Problem: NEUROSENSORY - ADULT  Goal: Achieves stable or improved neurological status  Description: INTERVENTIONS  - Monitor and report changes in neurological status  - Monitor vital signs such as temperature, blood pressure, glucose, and any other labs ordered   - Initiate measures to prevent increased intracranial pressure  - Monitor for seizure activity and implement precautions if appropriate      Outcome: Not Progressing  Goal: Achieves maximal functionality and self care  Description: INTERVENTIONS  - Monitor swallowing and airway patency with patient fatigue and changes in neurological status  - Encourage and assist patient to increase activity and self care.   - Encourage visually impaired, hearing impaired and aphasic patients to use assistive/communication devices  Outcome: Not Progressing     Problem: CARDIOVASCULAR - ADULT  Goal: Maintains optimal cardiac output and hemodynamic stability  Description: INTERVENTIONS:  - Monitor I/O, vital signs and rhythm  - Monitor for S/S and trends of decreased cardiac output  - Administer and titrate ordered vasoactive medications to optimize hemodynamic stability  - Assess quality of pulses, skin color and temperature  - Assess for signs of decreased coronary artery perfusion  - Instruct patient to report change in severity of symptoms  Outcome: Progressing  Goal: Absence of cardiac dysrhythmias or at baseline rhythm  Description: INTERVENTIONS:  - Continuous cardiac monitoring, vital signs, obtain 12 lead EKG if  ordered  - Administer antiarrhythmic and heart rate control medications as ordered  - Monitor electrolytes and administer replacement therapy as ordered  Outcome: Progressing     Problem: GASTROINTESTINAL - ADULT  Goal: Minimal or absence of nausea and/or vomiting  Description: INTERVENTIONS:  - Administer IV fluids if ordered to ensure adequate hydration  - Maintain NPO status until nausea and vomiting are resolved  - Nasogastric tube if ordered  - Administer ordered antiemetic medications as needed  - Provide nonpharmacologic comfort measures as appropriate  - Advance diet as tolerated, if ordered  - Consider nutrition services referral to assist patient with adequate nutrition and appropriate food choices  Outcome: Progressing  Goal: Maintains or returns to baseline bowel function  Description: INTERVENTIONS:  - Assess bowel function  - Encourage oral fluids to ensure adequate hydration  - Administer IV fluids if ordered to ensure adequate hydration  - Administer ordered medications as needed  - Encourage mobilization and activity  - Consider nutritional services referral to assist patient with adequate nutrition and appropriate food choices  Outcome: Progressing  Goal: Maintains adequate nutritional intake  Description: INTERVENTIONS:  - Monitor percentage of each meal consumed  - Identify factors contributing to decreased intake, treat as appropriate  - Assist with meals as needed  - Monitor I&O, weight, and lab values if indicated  - Obtain nutrition services referral as needed  Outcome: Progressing  Goal: Establish and maintain optimal ostomy function  Description: INTERVENTIONS:  - Assess bowel function  - Encourage oral fluids to ensure adequate hydration  - Administer IV fluids if ordered to ensure adequate hydration   - Administer ordered medications as needed  - Encourage mobilization and activity  - Nutrition services referral to assist patient with appropriate food choices  - Assess stoma site  -  Consider wound care consult   Outcome: Progressing  Goal: Oral mucous membranes remain intact  Description: INTERVENTIONS  - Assess oral mucosa and hygiene practices  - Implement preventative oral hygiene regimen  - Implement oral medicated treatments as ordered  - Initiate Nutrition services referral as needed  Outcome: Progressing     Problem: GENITOURINARY - ADULT  Goal: Maintains or returns to baseline urinary function  Description: INTERVENTIONS:  - Assess urinary function  - Encourage oral fluids to ensure adequate hydration if ordered  - Administer IV fluids as ordered to ensure adequate hydration  - Administer ordered medications as needed  - Offer frequent toileting  - Follow urinary retention protocol if ordered  Outcome: Progressing  Goal: Urinary catheter remains patent  Description: INTERVENTIONS:  - Assess patency of urinary catheter  - If patient has a chronic marie, consider changing catheter if non-functioning  - Follow guidelines for intermittent irrigation of non-functioning urinary catheter  Outcome: Progressing     Problem: METABOLIC, FLUID AND ELECTROLYTES - ADULT  Goal: Electrolytes maintained within normal limits  Description: INTERVENTIONS:  - Monitor labs and assess patient for signs and symptoms of electrolyte imbalances  - Administer electrolyte replacement as ordered  - Monitor response to electrolyte replacements, including repeat lab results as appropriate  - Instruct patient on fluid and nutrition as appropriate  Outcome: Progressing  Goal: Fluid balance maintained  Description: INTERVENTIONS:  - Monitor labs   - Monitor I/O and WT  - Instruct patient on fluid and nutrition as appropriate  - Assess for signs & symptoms of volume excess or deficit  Outcome: Progressing  Goal: Glucose maintained within target range  Description: INTERVENTIONS:  - Monitor Blood Glucose as ordered  - Assess for signs and symptoms of hyperglycemia and hypoglycemia  - Administer ordered medications to  maintain glucose within target range  - Assess nutritional intake and initiate nutrition service referral as needed  Outcome: Progressing     Problem: HEMATOLOGIC - ADULT  Goal: Maintains hematologic stability  Description: INTERVENTIONS  - Assess for signs and symptoms of bleeding or hemorrhage  - Monitor labs  - Administer supportive blood products/factors as ordered and appropriate  Outcome: Progressing     Problem: MUSCULOSKELETAL - ADULT  Goal: Maintain or return mobility to safest level of function  Description: INTERVENTIONS:  - Assess patient's ability to carry out ADLs; assess patient's baseline for ADL function and identify physical deficits which impact ability to perform ADLs (bathing, care of mouth/teeth, toileting, grooming, dressing, etc.)  - Assess/evaluate cause of self-care deficits   - Assess range of motion  - Assess patient's mobility  - Assess patient's need for assistive devices and provide as appropriate  - Encourage maximum independence but intervene and supervise when necessary  - Involve family in performance of ADLs  - Assess for home care needs following discharge   - Consider OT consult to assist with ADL evaluation and planning for discharge  - Provide patient education as appropriate  Outcome: Progressing     Problem: COPING  Goal: Pt/Family able to verbalize concerns and demonstrate effective coping strategies  Description: INTERVENTIONS:  - Assist patient/family to identify coping skills, available support systems and cultural and spiritual values  - Provide emotional support, including active listening and acknowledgement of concerns of patient and caregivers  - Reduce environmental stimuli, as able  - Provide patient education  - Assess for spiritual pain/suffering and initiate spiritual care, including notification of Pastoral Care or [place] based community as needed  - Assess effectiveness of coping strategies  Outcome: Progressing  Goal: Will report anxiety at manageable  levels  Description: INTERVENTIONS:  - Administer medication as ordered  - Teach and encourage coping skills  - Provide emotional support  - Assess patient/family for anxiety and ability to cope  Outcome: Progressing     Problem: BEHAVIOR  Goal: Pt/Family maintain appropriate behavior and adhere to behavioral management agreement, if implemented  Description: INTERVENTIONS:  - Assess the family dynamic   - Encourage verbalization of thoughts and concerns in a socially appropriate manner  - Assess patient/family's coping skills and non-compliant behavior (including use of illegal substances).  - Utilize positive, consistent limit setting strategies supporting safety of patient, staff and others  - Initiate consult with Case Management, Spiritual Care or other ancillary services as appropriate  - If a patient's/visitor's behavior jeopardizes the safety of the patient, staff, or others, refer to organization procedure.   - Notify Security of behavior or suspected illegal substances which indicate the need for search of the patient and/or belongings  - Encourage participation in the decision making process about a behavioral management agreement; implement if patient meets criteria  Outcome: Progressing     Problem: Potential for Falls  Goal: Patient will remain free of falls  Description: INTERVENTIONS:  - Educate patient/family on patient safety including physical limitations  - Instruct patient to call for assistance with activity   - Consult OT/PT to assist with strengthening/mobility   - Keep Call bell within reach  - Keep bed low and locked with side rails adjusted as appropriate  - Keep care items and personal belongings within reach  - Initiate and maintain comfort rounds  - Make Fall Risk Sign visible to staff  - Offer Toileting every 2 Hours, in advance of need  - Initiate/Maintain bed alarm  - Obtain necessary fall risk management equipment: non skid footwear  - Apply yellow socks and bracelet for high fall  risk patients  - Consider moving patient to room near nurses station  Outcome: Progressing     Problem: MOBILITY - ADULT  Goal: Maintain or return to baseline ADL function  Description: INTERVENTIONS:  -  Assess patient's ability to carry out ADLs; assess patient's baseline for ADL function and identify physical deficits which impact ability to perform ADLs (bathing, care of mouth/teeth, toileting, grooming, dressing, etc.)  - Assess/evaluate cause of self-care deficits   - Assess range of motion  - Assess patient's mobility; develop plan if impaired  - Assess patient's need for assistive devices and provide as appropriate  - Encourage maximum independence but intervene and supervise when necessary  - Involve family in performance of ADLs  - Assess for home care needs following discharge   - Consider OT consult to assist with ADL evaluation and planning for discharge  - Provide patient education as appropriate  Outcome: Progressing  Goal: Maintains/Returns to pre admission functional level  Description: INTERVENTIONS:  - Perform AM-PAC 6 Click Basic Mobility/ Daily Activity assessment daily.  - Set and communicate daily mobility goal to care team and patient/family/caregiver.   - Collaborate with rehabilitation services on mobility goals if consulted  - Out of bed for toileting  - Record patient progress and toleration of activity level   Outcome: Progressing

## 2024-04-15 NOTE — PROGRESS NOTES
[facility]  Progress Note: Critical Care  Name: Carla Hunt 59 y.o. female I MRN: [id number]  Unit/Bed#: MICU 10 I Date of Admission: 1/10/2024   Date of Service: 4/15/2024 I Hospital Day: 96    Assessment/Plan   Neuro:  #Alertness & analgesia  - OFF modafinil 100mg qd as of 4/8  - Delirium precautions  - Patient is awake and opening eyes but not moving purposefully or responding to commands, will hold on any sedating medications at this time  - Hold analgesia and sedation aside from periprocedural requirements     #Metabolic encephalopathy; POA  - Waxing and waning neuro exam since admission  - MRI brain on 4/6 showed findings suggestive of embolic etiology without significant edema, mass effect or hemorrhagic transformation (suspect septic emboli)  - Repeat MRI brain on 4/10 (obtained due to concern for acute decreased attenuation of the left hemisphere) confirms likely [septic] embolic strokes from prior scans + several new small areas of infarction + a small new hemorrhage in the left inferior parietal lobe.  - CTA H/N on 4/12 negative for evidence of mycotic aneurysms  - Electrolytes, sodium, hyperglycemia 2/2 high dose steroids requiring insulin gtt. TME may be prolonged 2/2 infectious burden, possibly worsened by uremic encephalopathy worsened by ELIESER now resolved  - Bcx growing fungemia, identified as candida albicans - most recent positive cultures on 4/12  - Cryptococcal ag negative  - Aspergillus galactomannan ag negative  - Tb quantiferon gold indeterminate 2/2 lymphopenia    --  Plan:  - Corticosteroids now weaned to off  - CRRT off with persistent uremia   - Follow up serial blood cultures - positive for yeast as of 4/12  - Plan for repeat as TTE as below  - Possible concomitant intraabdominal infection related to ostomy (see ID A/P) contributing to TME in setting of other comorbidities affecting mentation (embolic strokes, left inferior parietal IPH, uremia  (in setting of GI bleed - see below), prolonged critical illness with associated delirium)  - Appreciate Neurology input  - Ammonia elevated; start lactulose and titrate to 3-4 BM per day              - Can add rifaximin if needed  - Frequent neurochecks     #CVA due to multiple bilateral foci of acute infarcts   - MRI brain 4/6- multiple small bilateral foci of acute infarcts. No significant edema, mass effect, or hemorrhagic transformation -- suspicious for septic embolic phenomenon  - MRI brain on 4/10 showed known infarcts with several new small infarcts  - Not on AP/AC. Recent TTE 4/1 without vegetation   Plan:  - SATURNINO requested to assess for endocarditis in light of brain MRI concerning for septic emboli in setting of fungemia, but aborted due to difficult access to esophagus despite visual aid by bronchoscope              - Patient does have a PFO on bubble study with a DVT in the RLE which also contributes to stroke risk   - Avoid ASA due to small hemorraghic conversion of septic emboli  - Frequent neuro checks  - Repeat TTE per ID to reassess for vegetations     #Seizure disorder, known history  - S/p partial left temporal lobe resection in 2007 2/2 complex migraines  - On Lamictal 150 bid and Topamax 50mg bid at home  - Last lamotrigine level from 03/02 at 10.7 (therapeutic)  - Home Lamictal switched to Vimpat 3/27 due to worsening pancytopenia, neuro following  - Continue Vimpat 100mg bid maintenance dose  - Seizure precautions      #Anxiety, known history  -On Effexor 12.5 mg TID        CV:  #Sinus tachycardia  - TTE 3/17 with no major structural dysfunction  - Multifactorial 2/2 deconditioning, dehydration/hypvol, acute on chronic anemia, underlying infectious/inflammatory process, pain/agitation  - TTE 4/1 with EF 70%, no WMA, no changes from prior  - TTE with bubble study 4/7 showed LVEF 70% with right to left shunting related to PFO vs intrapulmonary shunt  - SATURNINO 4/11 as below  - Etiology likely  secondary to infectious processes (fungemia, intra-abdominal infection related to ostomy, pneumonia) which are being addressed              - No need for beta blockade after discussion with Cardiology        Pulm:  #Acute hypoxic respiratory failure;  #Bilateral ground glass opacities, improving  - Vent dependent; s/p trach 3/12 after was reintubated 3/11 due to increased WOB  - Complicated by recurrent bacterial PNA treated w 10d levaquin (completed 2/25) for MDR PNA; most recent BAL +recurrent stenotrophomona treated with Bactrim, switched to minocyline 14d course completed 3/28.  - Etiology likely multifactorial including possible COVID pneumonitis vs recurrent PNA vs hypersensitivity pneumonitis 2/2 ?rituxumab. Persistent inflammation likely given patient has been steroid responsive throughout admission.   - Repeat CXR 3/22 with significant improvement of multifocal PNA. 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 2/2 poor wound healing midline incision  - Poor wound healing likely due to high dose steroid therapy s/p wound vac that was removed 3/17 by gen surg now s/p s/p OR 4/5 with surgically created mucus fistula and end ileostomy  Plan:  - Wound vac as per general surgery  - ALP and GGT elevated; likely ADR of fluconazole              - RUQ without acute biliary abnormality  - CT abdomen/pelvis with  concern for purulent material in the peritoneum and collection adjacent to the ostomy (after discussion with Surgery; read from Radiology notes hemoperitoneum)              - S/p IR drainage of fluid at same time as LP              - Appreciate Surgery assistance with this              - Zosyn as below  - Patient with upper GI bleed on 4/13              - CT high volume scan revealed findings concerning for active bleeding in the stomach              - EGD on 4/13 found esophageal ulcer actively bleeding s/p 2 clips with epi injected for hemostasis, severe esophagitis and ulcerations. However could not properly visualize the stomach.   - EGD on 4/14 found large clot burden in the stomach which was removed allowing for visualization of an underlying bleeding ulcer in the posterior fundus of the stomach (which correlates with the site of bleeding found on CT high volume scan) s/p clips x4 + epi + cautery.    - NGT replaced after EGD on 4/14; no tube feeds until ok per GI  - Lactulose as above; titrate to 3-4 BM per day (resume when parenteral feeding resumes)              - Can add rifaximin if needed              - Follow up NH3 down trending  - Monitor ostomy pouch output  - ID following  - General surgery following     #Oropharyngeal dysphagia   - Has had multiple and prolonged intubations now s/p trach   - VBS 3/27 oropharyngeal dysphagia  - Continue tube feeds for now until potential PEG placement pending abdominal wound healing as per Gen Surg     #Esophagitis   - Acute on chronic anemia associated bloody NG output on 4/3  - Uremic, worsening suggestive of UGIB given relatively stable Cr  - Bedside EGD x2 (4/2,4/3) Ulcerated mucosa in the upper third of the esophagus without evidence of hemorrhage   - Repeat EGD on 4/13 again shows severe ulcerations and severe esophagitis with bleeding ulcer (s/p clips x2 and epi injection)  - Stable on 4/14 EGD  Plan:  HSV/CMV/candida from GI sample negative  Continue PPI IV  bid  Reglan per GI  Monitor NGT output, stool output        :  #Uremia  - ?catabolic from steroids, may also have ?slow UGIB in setting of prolonged steroids though no overt s/s of GIB and H&H stable since 3/23 and patient is on ppi  - Trending up   - EGD without active source of bleeding but did note ulcerations  - FEurea suggestive of intrinsic pathology   - GFR largely stable, although slowly down trending  - Steroids now off  - Persistent off CRRT likely in setting of upper GI bleed  - BMP daily, uremia currently stable              - Cr slowly trending up; may be falsely elevated due to prolonged course of Bactrim so will d/c and monitor Cr (d/c 4/14/24)  - NH3 elevated; start lactulose +/- rifaximin as above        F/E/N:  F: d5w 50cc/hr while off tube feeds pending further GI eval  E: monitor and replete for goal K>4, P>3, Mg>2  N: tube feeds with Nepro 45 cc/h, Prosource liquid protein to 1 packet, Refugio BID to support wound healing, 200 cc FWF q6h               - Currently HELD pending GI eval; resume once able from GI standpoint        Heme/Onc:  #Pancytopenia, improving with intermittent plt/prbc's transfusion, s/p Filgastrim x3  - In setting of hx of B cell lymphoma, prior chemo, Rituxan therapy, prior abx and present meds including lamictal  - Hemo onc consulted, empirically given Filgastrim 300mg qd x3d (completed 3/30); IR bone marrow bx deferred by heme-onc   - Lamictal switched to Vimpat in consultation with neuro as above due to potential contribution to pancytopenia  - Trend CBC and diff daily, neutropenic precautions for ANC <500     #Acute on chronic anemia  - Patient had significant blood loss from ostomy site 3/20, resulting in hemorraghic shock, improved with blood transfusion; hemostasis achieved after bleeding source identified and sutured. Another large vol danie bloody output from osotomy on 3/21, bleeding source within ostomy site, sutured.    - EGD 4/3 and 4/4 without active bleeding,  however repeat large volume bleed from stomach and esophagus on 4/13 (see above) requiring 4u pRBC  - ?Worsened by impaired marrow response liekly 2/2 persistent inflammation due to low retic index ~1 prior to most recent transfusions; normocytic with normal B12/folate levels; MCV<100. Iatrogenic cause likely contributing 2/2 frequent blood draws; chronic anemia likely persistent inflammatory state/CKD/lymphoma in setting of elevated ferritin of 974. SPEP/UPEP negative.  Plan:  - Routine monitoring ostomy output, if bleeding, apply direct pressure and contact gen surg STAT for hemostasis .  - Continue tube feeds/nutritional support  - Give FFP/vitamin K to correct INR as indicated  - Trend CBC, transfuse Transfuse with leukoreduced and irradiated RBCs to maintain Hgb>7     #Thrombocytopenia  - Likely multifactorial including imparied production from marrow dysfunction in setting of persistent inflammation; RI low suggestive of hyperproliferation, hx of B-cell lymphoma, recent infections including persistent COVID, PNA treated, CMV negative. No known history of vWD/congenital disorders. No liver dysfunction; recent hepatic profile unremarkable. HIT workup negative, Hemolytic workup negative. No s/s DIC. No mechanical valves. ?Primary ITP.  Plan:  - Filgrastim 300mg x3 to aid in plt recovery  - Transfuse with leukoreduced and irradiated PLTs if count <20k due to increased risk of bleeding   - ONLY transfuse Plt if <20k and actively bleeding  - DVT ppx held, start heparin subq 4/11  - See plan under acute on chronic anemia      # Lymphopenia with bandemia  - In setting of high dose steroids, now off  - Severely depressed immunoglobulins inclding IgG, IgA, IgM  - At risk for further infections  - Filgrastim 300mg x3 to aid in plt recovery  - Contact and airborne precautions     #B cell lymphoma  - [facility]  - S/P 6 cycles of chemotherapy in 20222  - Maintained on Rituxan for 2 year course PTA,  started May 2022, last dose was 12/2024, treatment currently on hold in setting of persistent COVID-19 infection  Anti-Ritux Ab negative  Plan:  - Holding rituximab in setting of persistent COVID-19 infection, now resolved.  ?resume rituxubmab pending clinical stability & anti-ritux ab status in consultation with heme-onc     DVT ppx: Lovenox     Endo:  #Steroid hyperglycemia and diabetes mellitus type 2, A1c at 6.6 from 01/2024  - Goal -180  - Utilize SSI no insulin gtt     #R thyroid gland enlargement  - Seen on CT 4/5  - Follow up US 4/5- Limited exam due to overlying tracheostomy tube and central line catheter. Heterogeneous lobular appearance to the remaining thyroid gland, nonspecific, question sequela of thyroiditis. No discrete nodule identified.   T4/TSH unremarkable   Plan: no longer planning thyroid FNA biopsy; likely bleed related to tracheostomy insertion  Will need follow up thyroid US sometime week of 4/15 to follow up     ID:  #Fungemia   BCx growing yeast - most recently on 4/8, ID panel- candida albicans  In setting of severe lymphopenia and severely depressed immunoglobulins   Septic emboli noted  Ophtho consulted, no evidence of ophthalmic endophthalmitis   Plan:   Escalate to micafungin per ID on 4/14 given persistent positive blood cultures despite attempted source control of abdominopelvic fluid collection  Will discuss with ID about further abdominal imaging given recent CT abdomen/pelvis on 4/13  Follow up LP: AFB, fungal culture, bacterial culture, crypto, cytology, ME panel, autoimmune panel pending  SATURNINO per Cardiology postponed  Avoid broad spectrum abx as it can contribute to fungal growth  Continue Zosyn as there is now concern for intraabdominal infection related to ostomy  HD cath out  F/U Repeat serial Bcx (q48h) to ensure clearance    ID following; all Abx will need dose adjustments if renal function worsens     #PCP ppx  Stop Bactrim as steroids are off after 4/12      #Recurrent bacterial pneumonia  - Patient has completed multiple treatment courses of remdesivir throughout hospitalization in addition to 7 days of ceftriaxone.  - BAL cultures in 2/2024 positive for MDR Pseudomonas and stenotrophomonas treated with 10d course of Levaquin  - CT C/A/P 3/10 with persistent groundglass opacities and changes suggestive of sequelae of COVID, prior infections.  Completed 10d course of cefepime for broad spectrum coverage (completed (3/13)  - Repeat BAL cultures from 3/11 showing 4+ growth Stenotrophomonas maltophilia. Could be colonization given prior BAL growth of same, however due to recent intubation with prolonged corticosteroid theraphy, will begin treating as true pathogen. Patient otherwise remains afebrile, no leukocytosis. CRP with down trending.  Previously on Bactrim from 3/13 to 3/18, discontinued due to worsening ELIESER  Plan:  - S/P 14d Minocycline course to tx stenotrophomonas PNA, completed 3/27   - IV steroids as above  - Minocycline as sputum Cx growing steno (3/31) - off 4/11        MSK/Skin:  #Midline incision wound with poor wound healing-  - General surgery performed staple removal of the patient's midline incision on 3/12 with some skin signs appreciated at the inferior aspect of the wound without obvious pus drainage. Overnight 3/13, wound dehiscence progressed requiring general surgery reevaluation. Red/brown aspirate noted, fascia intact. Wet-to-dry dressings in place. General surgery following for next Epson wound care.  - Poor wound healing in setting of high-dose steroid therapy.  No  exam no obvious signs of infection at this time.   - S/P wound vac replacement 3/13 as per gen surgery. No active concerns for cellulitis.  Plan:  - Wound VAC management as per general surgery  - Wound care for peritracheostomy wound  - Abdominal wound care as per general surgery  - Routine monitoring     #Critical illness myopathy  - Likely exacerbated by high-dose steroid  therapy  Plan:  - Continue to wean steroids as above  - Aggressive PT/OT once clinically stable              Disposition: Critical care    ICU Core Measures     Vented Patient  VAP Bundle  VAP bundle ordered     A: Assess, Prevent, and Manage Pain Has pain been assessed? NA  Need for changes to pain regimen? NA   B: Both Spontaneous Awakening Trials (SATs) and Spontaneous Breathing Trials (SBTs) Plan to perform spontaneous awakening trial today? No secondary to - not on sedation  Plan to perform spontaneous breathing trial today? Yes   Obvious barriers to extubation? No   C: Choice of Sedation RASS Goal: 0 Alert and Calm  Need for changes to sedation or analgesia regimen? NA   D: Delirium CAM-ICU: Unable to perform secondary to Acute cognitive dysfunction   E: Early Mobility  Plan for early mobility? NA   F: Family Engagement Plan for family engagement today? Yes       Antibiotic Review: Patient on appropriate coverage based on culture data.     Review of Invasive Devices:    Diana Plan: Continue for accurate I/O monitoring for 48 hours        Prophylaxis:  VTE Contraindicated secondary to: Plt <50   Stress Ulcer  covered bypantoprazole (PROTONIX) injection 40 mg [[id number]]        Significant 24hr Events     24hr events: Yesterday, patient underwent second look EGD with GI. Previously clipped esophageal ulcer was not bleeding. Large clot burden in the stomach was removed which revealed an underlying bleeding ulcer that was clipped x4 + epi + cautery. Patient was also started on D5W due to hypernatremia while holding tube feeds. Switched fluconazole to micafungin yesterday as well as her blood cultures continue to be positive for yeast despite suspected source control of the intraabdominal fluid collection.     Subjective     Review of Systems   Unable to perform ROS: Mental status change        Objective                            Vitals I/O      Most Recent Min/Max in 24hrs   Temp 98.2 °F (36.8 °C) Temp  Min: 96.3  °F (35.7 °C)  Max: 99 °F (37.2 °C)   Pulse (!) 116 Pulse  Min: 107  Max: 124   Resp (!) 24 Resp  Min: 18  Max: 30   /64 BP  Min: 90/52  Max: 162/76   O2 Sat 99 % SpO2  Min: 97 %  Max: 100 %      Intake/Output Summary (Last 24 hours) at 4/15/2024 0620  Last data filed at 4/15/2024 0601  Gross per 24 hour   Intake 2473.74 ml   Output 835 ml   Net 1638.74 ml       Diet Enteral/Parenteral; Tube Feeding No Oral Diet; Nepro; Cyclic; 45; 20 hours; Prosource Protein Liquid - One Packet; Refugio Unflavored - One Packet; BID; 200; Water; Every 6 hours    Invasive Monitoring           Physical Exam   Physical Exam  Vitals and nursing note reviewed.   HENT:      Head: Normocephalic and atraumatic.   Neck:      Trachea: Tracheostomy present.   Cardiovascular:      Rate and Rhythm: Regular rhythm. Tachycardia present.      Heart sounds: S1 normal and S2 normal. No murmur heard.  Musculoskeletal:      Cervical back: Neck supple.      Right lower le+ Pitting Edema present.      Left lower le+ Pitting Edema present.   Abdominal: General: Bowel sounds are normal. There is no distension.      Palpations: Abdomen is soft.   Constitutional:       General: She is in acute distress.      Appearance: Normal appearance. She is ill-appearing.      Interventions: She is intubated.   Pulmonary:      Effort: She is intubated.      Breath sounds: Normal breath sounds.      Comments: Coarse breath sounds bilaterally           Diagnostic Studies      EKG:   Imaging:  I have personally reviewed pertinent reports.       Medications:  Scheduled PRN   chlorhexidine, 15 mL, Q12H SARTHAK  fluconazole, 800 mg, Q24H  insulin lispro, 1-6 Units, Q6H SARTHAK  lacosamide, 100 mg, Q12H  lactulose, 20 g, BID  magnesium sulfate, 2 g, Once  micafungin, 100 mg, Q24H  nystatin, , BID  pantoprazole, 40 mg, Q12H SARTHAK  piperacillin-tazobactam, 4.5 g, Q8H  polyethylene glycol, 17 g, Daily  sodium chloride, 2 spray, BID  sodium chloride, 4 mL, TID      acetaminophen,  650 mg, Q6H PRN  fentaNYL, 50 mcg, Q1H PRN  ondansetron, 4 mg, Q6H PRN  sodium chloride, 1 Application, Q1H PRN       Continuous    dexmedetomidine, 0.1-0.7 mcg/kg/hr, Last Rate: Stopped (04/11/24 1224)  dextrose, 50 mL/hr, Last Rate: 50 mL/hr (04/15/24 0114)  norepinephrine, 1-30 mcg/min, Last Rate: 1 mcg/min (04/11/24 1242)         Labs:    CBC    Recent Labs     04/14/24 0521 04/14/24  1653 04/14/24  2157 04/15/24  0521   WBC 12.42*  --  16.43* 15.66*   HGB 6.0*   < > 8.5* 8.1*   HCT 17.9*   < > 24.6* 23.4*   PLT 77*  --  68* 72*   BANDSPCT 36*  --  46*  --     < > = values in this interval not displayed.     BMP    Recent Labs     04/14/24  2157 04/15/24  0521   SODIUM 147 149*   K 3.8 3.7   * 117*   CO2 15* 16*   AGAP 16* 16*   BUN 73* 73*   CREATININE 1.25 1.23   CALCIUM 9.7 9.7       Coags    No recent results     Additional Electrolytes  Recent Labs     04/14/24  0521 04/15/24  0521   MG 1.9 1.7*   PHOS 5.9* 6.0*   CAIONIZED 1.39*  --           Blood Gas    No recent results  No recent results LFTs  Recent Labs     04/14/24  0521 04/15/24  0521   ALT 25 25   AST 14 15   ALKPHOS 307* 292*   ALB 2.5* 2.1*   TBILI 1.48* 1.67*       Infectious  No recent results  Glucose  Recent Labs     04/14/24  0521 04/14/24  2157 04/15/24  0521   GLUC 127 229* 179*               Livan Morfin DO

## 2024-04-15 NOTE — PROGRESS NOTES
"[facility]  Progress Note  Name: Carla Hunt I  MRN: [id number]  Unit/Bed#: MICU 10 I Date of Admission: 1/10/2024   Date of Service: 4/15/2024 I Hospital Day: 96    Assessment/Plan   Goals of care, counseling/discussion  Assessment & Plan  Code Status: full - Level 1  Ongoing medical optimization without limits at this time. Regan awaits any improvement in mental status. If no improvement will reconsider GOC. He casually mentions time frame of \"a week or so\".   While Regan remains hopeful for gradual improvement and stability to be d/c to rehab, he is also open to goals and appreciates honest information from treatment teams. He is feeling less optimistic about her recovery following diagnosis of fungemia and mental status change.  Continue to address goals of care at spouse's pace    Palliative care patient  Assessment & Plan  Regan continues to be consistently present and invested in patient's care.   Also supported by her daughter, son, and robust  group of friends, brother, and natalia community  Decisional apparatus:  Patient is not competent on my exam today.  If competence is lost, patient's substitute decision maker would default to spouse Min by [place].  Advance Directive / Living Will / POLST:  None on file, unable to complete  Palliative care SORAYA continues to follow  Pastoral care following for support    Cecal volvulus (HCC)  Assessment & Plan  S/p ileocectomy, mucus fistula, end ileostomy on 3/13  and now subsequent poor wound healing. Undergoing twice daily dressing changes.    Teto marginal zone B-cell lymphoma (HCC)  Assessment & Plan  Previously on maintenance Rituxan therapy. Last treatment in December 2023    Encephalopathy  Assessment & Plan  Multi-specialty team treating individual factors that may contribute towards AMS. Provigil held at this time.  Identified to have fungemia, treatment started. CRRT initiated on 4/4 (since d/c as no " "improvement with mental status)  Unfortunately, Carla remains encephalopathic, not interactive during time of encounter.  CT head done 4/9/2024 revealed possibility of new hemorrhagic stroke--> MRI brain done 4/10/2024 revealed numerous new infarcts and confirmed small hemorrhagic area    * Acute respiratory failure with hypoxia (HCC)  Assessment & Plan  Admitted, severe COVID course complicated by incidences of pneumonia  Patient was re-intubated 3/11 with subsequent bedside trach on 3/12.   Was previously on high dose steroids, now weaned.   Patient back on ventilator support, previously on trach collar and using PMV at her best.          Interval history:       Patient unfortunately had several episodes of GI bleeds over the weekend. On Saturday developed danie blood from ileostomy and NGT. Underwent EGDs on 4/13 and 4/14 revealed gastric ulcer and esophageal ulcers both times undergoing clip/embolization. Required PRBC transfusions. She has remained unresponsive (unsure if persistent due to critical illness/metabolic encephalopathy) or another instance of sedation (which patient historically takes days to clear). Spouse (Regan) and brother (Shahriar) at bedside. Both feeling guarded regarding expectations but continue to desire \"moment by moment\" care. They are finding some comfort in playing her favorite music and hoping that she can experience it in some realm.     MEDICATIONS / ALLERGIES:     all current active meds have been reviewed    No Known Allergies    OBJECTIVE:    Physical Exam  Physical Exam  Constitutional:       General: She is not in acute distress.     Appearance: She is ill-appearing.   HENT:      Head: Atraumatic.   Eyes:      Comments: No eye opening during time of encounter   Cardiovascular:      Rate and Rhythm: Tachycardia present.   Pulmonary:      Comments: Ventilated via  trach  Abdominal:      Comments: Abdominal wound visualized via media  tab   Musculoskeletal:         General: " Swelling present.   Skin:     General: Skin is warm and dry.   Neurological:      Comments: No purposeful movement, does not open eyes during time of encounter   Psychiatric:      Comments: calm         Lab Results:   Results from last 7 days   Lab Units 04/15/24  0521 04/14/24  2157 04/14/24  1653 04/14/24  0521   WBC Thousand/uL 15.66* 16.43*  --  12.42*   HEMOGLOBIN g/dL 8.1* 8.5* 9.4* 6.0*   HEMATOCRIT % 23.4* 24.6* 27.2* 17.9*   PLATELETS Thousands/uL 72* 68*  --  77*   MONO PCT % 2* 2*  --  1*   EOS PCT % 0 0  --  0     Results from last 7 days   Lab Units 04/15/24  0521 04/14/24  2157 04/14/24  0521 04/13/24  0554   POTASSIUM mmol/L 3.7 3.8 3.2* 3.5   CHLORIDE mmol/L 117* 116* 119* 118*   CO2 mmol/L 16* 15* 18* 17*   BUN mg/dL 73* 73* 76* 70*   CREATININE mg/dL 1.23 1.25 1.31* 1.36*   CALCIUM mg/dL 9.7 9.7 10.1 9.9   ALK PHOS U/L 292*  --  307* 743*   ALT U/L 25  --  25 31   AST U/L 15  --  14 16       Imaging Studies: reviewed pertinent studies   EKG, Pathology, and Other Studies: reviewed pertinent studies    Counseling / Coordination of Care    Total floor / unit time spent today 30 minutes. Greater than 50% of total time was spent with the patient and / or family counseling and / or coordination of care. A description of the counseling / coordination of care: time spent assessing patient, providing support to spouse at bedside, communication with primary team and RN.

## 2024-04-15 NOTE — UTILIZATION REVIEW
"Continued Stay Review    Date: 02/29                          Current Patient Class: IP  Current Level of Care: Level 2 stepdown/HOT    HPI:59 y.o. female initially admitted on 01/10     Assessment/Plan: Pt remained intubated, continued to wean FiO2/PEEP overnight, trial of diuresis with good response,  weaned off, given 2 doses of prn dilaudid and 2 doses 2mg versed overnight.  Some agitation with turning noted this am.  Trial of PSV tolerated well this am but episode of hypertension and tachypnea with hypoxia during family visitation, returned to PCV.     Cont PCV for now, and then reattempt PSV to allow for minimized sedation needs - mental status and weakness precludes SBT for extubation at this time, wean solumedrol 125mg q12hrs. Cont precedex, wean off dilaudid today. Goal MAP >65. Cont TF. cont water 350cc q4hrs, trend BMP q12hrs, replete electrolytes, cont trial of diuresis now goal negative 500-1000cc as BP will permit. Completed IV Levo, mon off abx. CRP q48h. Cont Insulin gtt. Hgb 6.4->6.3 togay, transfuse 1unit PRBCs now, check repeat CBC in citrate tube after transfusion to see plt count given persistent clumping.     Vital Signs: /58   Pulse 97   Temp 98.1 °F (36.7 °C)   Resp 16   Ht 5' 8\" (1.727 m)   Wt 75.5 kg (166 lb 7.2 oz)   SpO2 94%   BMI 25.31 kg/m²       Pertinent Labs/Diagnostic Results:       Results from last 7 days   Lab Units 02/29/24  0552 02/29/24  0429 02/28/24  0638 02/28/24  0430 02/27/24  1312 02/27/24  0946 02/27/24  0437 02/26/24  1446 02/25/24  2300 02/25/24  0419   WBC Thousand/uL  --  10.82*  --  9.97  --   --  7.67 14.26*  --  8.19   HEMOGLOBIN g/dL 6.3* 6.4* 7.4* 6.9* 7.7*   < > 7.3* 10.0*  --  10.0*   I STAT HEMOGLOBIN   --   --   --   --   --   --   --   --    < >  --    HEMATOCRIT % 21.3* 21.5* 24.1* 23.0* 25.6*  --  24.5* 32.1*  --  31.3*   HEMATOCRIT, ISTAT   --   --   --   --   --   --   --   --    < >  --    PLATELETS Thousands/uL  --   --   --   --   -- "   --  104* 139*  --  103*   BANDS PCT %  --   --   --   --   --   --   --  13*  --  8    < > = values in this interval not displayed.         Results from last 7 days   Lab Units 02/29/24  0429 02/28/24  1818 02/28/24  0430 02/27/24  1939 02/27/24  0437 02/26/24  0130 02/25/24  2301 02/25/24  2300 02/25/24  0844 02/25/24  0419 02/24/24  0817 02/24/24  0413 02/23/24  1151 02/23/24  0602   SODIUM mmol/L 151* 150* 150* 152* 153*   < > 155*  --    < > 149*   < > 146   < > 150*   POTASSIUM mmol/L 3.2* 3.8 3.0* 3.3* 3.5   < > 3.9  --    < > 3.3*   < > 3.9   < > 4.3   CHLORIDE mmol/L 109* 109* 111* 113* 115*   < > 108  --    < > 109*   < > 110*   < > 115*   CO2 mmol/L 34* 32 33* 32 33*   < > 39*  --    < > 34*   < > 30   < > 28   CO2, I-STAT mmol/L  --   --   --   --   --   --   --  38*  --   --   --   --   --   --    ANION GAP mmol/L 8 9 6 7 5   < > 8  --    < > 6   < > 6   < > 7   BUN mg/dL 34* 33* 31* 31* 32*   < > 32*  --    < > 37*   < > 43*   < > 47*   CREATININE mg/dL 0.64 0.72 0.58* 0.61 0.61   < > 0.62  --    < > 0.59*   < > 0.70   < > 0.77   EGFR ml/min/1.73sq m 98 92 101 100 100   < > 99  --    < > 101   < > 95   < > 85   CALCIUM mg/dL 10.8* 10.7* 10.5* 10.4* 10.1   < > 9.9  --    < > 10.1   < > 10.5*   < > 10.0   CALCIUM, IONIZED mmol/L  --   --   --   --   --   --  1.31  --   --   --   --   --   --   --    CALCIUM, IONIZED, ISTAT mmol/L  --   --   --   --   --   --   --  1.39*  --   --   --   --   --   --    MAGNESIUM mg/dL 2.0  --   --   --  2.0  --   --   --   --  1.7*  --  1.6*  --  1.9   PHOSPHORUS mg/dL 1.8*  --   --   --  3.3  --   --   --   --   --   --   --   --   --     < > = values in this interval not displayed.     Results from last 7 days   Lab Units 02/26/24  1446 02/25/24  2301   AST U/L 11* 10*   ALT U/L 16 17   ALK PHOS U/L 63 68   TOTAL PROTEIN g/dL 4.9* 4.8*   ALBUMIN g/dL 2.5* 2.4*   TOTAL BILIRUBIN mg/dL 0.78 0.70   BILIRUBIN DIRECT mg/dL 0.60*  --      Results from last 7 days   Lab Units  "02/29/24  1518 02/29/24  1200 02/29/24  1018 02/29/24  0747 02/29/24  0551 02/29/24  0422 02/29/24  0218 02/29/24  0011 02/28/24  2157 02/28/24 2003 02/28/24  1744 02/28/24  1627   POC GLUCOSE mg/dl 131 183* 170* 200* 108 149* 185* 129 189* 174* 142* 147*     Results from last 7 days   Lab Units 02/29/24  0429 02/28/24  1818 02/28/24  0430 02/27/24  1939 02/27/24  0437 02/26/24  1446 02/26/24  0130 02/25/24  2301 02/25/24  1942 02/25/24  1646 02/25/24  1224 02/25/24  0844   GLUCOSE RANDOM mg/dL 136 153* 147* 149* 136 219* 135 170* 128 95 155* 108     Results from last 7 days   Lab Units 02/24/24  0413 02/22/24  2018   OSMOLALITY, SERUM mmol/* 332*         No results found for: \"BETA-HYDROXYBUTYRATE\"   Results from last 7 days   Lab Units 02/29/24  0826 02/28/24 2158 02/28/24 1818   PH ART  7.461* 7.449 7.412   PCO2 ART mm Hg 44.1* 47.4* 50.0*   PO2 ART mm Hg 67.8* 81.5 74.8*   HCO3 ART mmol/L 30.7* 32.1* 31.1*   BASE EXC ART mmol/L 6.3 7.3 5.6   O2 CONTENT ART mL/dL 7.6* 11.4* 13.7*   O2 HGB, ARTERIAL % 91.5* 94.1 93.8*   ABG SOURCE  Line, Arterial Line, Arterial Line, Arterial     Results from last 7 days   Lab Units 02/26/24 0728   PH NICA  7.288*   PCO2 NICA mm Hg 71.2*   PO2 NICA mm Hg 46.5*   HCO3 NICA mmol/L 33.3*   BASE EXC NICA mmol/L 4.8   O2 CONTENT NICA ml/dL 12.9   O2 HGB, VENOUS % 77.3     Results from last 7 days   Lab Units 02/25/24  2300   I STAT BASE EXC mmol/L 9*   I STAT O2 SAT % 79   ISTAT PH ART  7.331*   I STAT ART PCO2 mm HG 68.7*   I STAT ART PO2 mm HG 48.0*   I STAT ART HCO3 mmol/L 36.3*             Results from last 7 days   Lab Units 02/29/24  1157 02/26/24  1446 02/25/24  2301   D-DIMER QUANTITATIVE ug/ml FEU 1.64* 1.97* 1.58*                 Results from last 7 days   Lab Units 02/26/24  0130 02/25/24  2301 02/24/24  1245   LACTIC ACID mmol/L 2.0 3.1* 1.4                         Results from last 7 days   Lab Units 02/29/24  0954   UNIT PRODUCT CODE  [id number]   UNIT NUMBER  " [id number]   UNITABO  A   UNITRH  NEG   CROSSMATCH  Compatible   UNIT DISPENSE STATUS  Issued   UNIT PRODUCT VOL mL 350         Results from last 7 days   Lab Units 02/26/24  0130   LIPASE u/L 71     Results from last 7 days   Lab Units 02/29/24  0429 02/28/24  0430 02/27/24  0143 02/26/24  1446   CRP mg/L 63.1* 133.9* 291.3* 338.1*         Results from last 7 days   Lab Units 02/24/24  0413 02/22/24  2018   OSMOLALITY, SERUM mmol/* 332*             Results from last 7 days   Lab Units 02/26/24  1251 02/26/24  1144   BLOOD CULTURE  No Growth at 72 hrs. No Growth at 72 hrs.                   Medications:   Scheduled Medications:  acetaminophen, 650 mg, Oral, Q6H  chlorhexidine, 15 mL, Mouth/Throat, Q12H SARTHAK  enoxaparin, 40 mg, Subcutaneous, Q24H SARTHAK  famotidine, 20 mg, Oral, BID  gabapentin, 200 mg, Oral, TID  ipratropium, 0.5 mg, Nebulization, TID  lamoTRIgine, 150 mg, Oral, BID  levalbuterol, 1.25 mg, Nebulization, TID  methylPREDNISolone sodium succinate, 125 mg, Intravenous, Q12H SARTHAK  nystatin, , Topical, BID  OLANZapine, 5 mg, Oral, Q12H  oxyCODONE, 5 mg, Per NG Tube, Q6H  polyethylene glycol, 17 g, Per NG Tube, Daily  potassium phosphate, 21 mmol, Intravenous, Once  senna, 1 tablet, Per NG Tube, BID  topiramate, 100 mg, Per PEG Tube, BID  venlafaxine, 25 mg, Per NG Tube, TID With Meals      Continuous IV Infusions:  dexmedetomidine, 0.1-1.2 mcg/kg/hr, Intravenous, Titrated  HYDROmorphone, 0.5 mg/hr, Intravenous, Continuous  insulin regular (HumuLIN R,NovoLIN R) 1 Units/mL in sodium chloride 0.9 % 100 mL infusion, 0.3-21 Units/hr, Intravenous, Titrated      PRN Meds:  HYDROmorphone, 0.5 mg, Intravenous, Q1H PRN  midazolam, 2 mg, Intravenous, Q4H PRN  ondansetron, 4 mg, Intravenous, Q6H PRN        Discharge Plan: D    Network Utilization Review Department  ATTENTION: Please call with any questions or concerns to [phone] and carefully listen to the prompts so that you are directed to the right  person. All voicemails are confidential.   For Discharge needs, contact Care Management DC Support Team at [phone] opt. [phone]  Send all requests for admission clinical reviews, approved or denied determinations and any other requests to dedicated fax number below belonging to the campus where the patient is receiving treatment. List of dedicated fax numbers for the Facilities:  FACILITY NAME UR FAX NUMBER   ADMISSION DENIALS (Administrative/Medical Necessity) [phone]   DISCHARGE SUPPORT TEAM (NETWORK) [phone]   PARENT CHILD HEALTH (Maternity/NICU/Pediatrics) [phone]   [facility] [phone]   [facility] [phone]   [facility] [phone]   [facility] [phone]   [facility] [phone]   [facility] [phone]   [facility] [phone]   [facility] [phone]   [facility] [phone]   [facility] [phone]   [facility] [phone]   [facility] [phone]        no [Negative] : Heme/Lymph

## 2024-04-15 NOTE — PROGRESS NOTES
"Progress Note - General Surgery   Carla Hunt 59 y.o. female MRN: [id number]  Unit/Bed#: MICU 10 Encounter: [id number]    Assessment:  59F with COVID/strep pna, B cell lymphoma on rituxumab, CKD; prolonged hospitalization 2/2 acute hypoxic respiratory failure s/p MICU bedside trach, hospitalization complicated by cecal volvulus s/p ileocectomy, mucus fistula, end ileostomy on 3/13, anticipated poor wound healing on prolonged high dose steroids and neutropenia; candidemia with noted UGI bleed s/p EGD with esophageal ulcerations per GI  S/p Or 4/4 for midline wound exploration with mucus fistula and end ileostomy viable appearing and above the fascia, noted nonviable subq tissue debrided and skin bridge between ostomy and midline incision removed, small area of exposed bowel protected and stoma isolated  Course complicated by pelvic abscess s/p IR drain placement on 4/10 and persistent candidemia   DVT with presumed paradoxical emboli  Multiple embolic strokes  S/p aborted SATURNINO 4/11    Afebrile, tachycardic up to the 120s, trach P CMV 18/16/6/40%  WBC 15.7 from 16.4  4/12 blood cultures budding yeast, 4/14 repeat blood cultures pending  Creatinine 1.23 from 1.25        Plan:  -Tube feeds as tolerated  - continue twice daily dressing changes with attempted ileostomy isolation and packing of the rest of the wound; additional dressing changes as needed  - Appreciate GI recommendations  -On Diflucan, micafungin, Zosyn, appreciate ID recommendations  -Care per ICU    Subjective/Objective   Subjective:   Remains intubated and sedated, no acute events overnight    Objective:     Blood pressure 129/64, pulse (!) 116, temperature 98.2 °F (36.8 °C), resp. rate (!) 24, height 5' 8\" (1.727 m), weight 88 kg (194 lb 0.1 oz), SpO2 99%.,Body mass index is 29.5 kg/m².      Intake/Output Summary (Last 24 hours) at 4/15/2024 0620  Last data filed at 4/15/2024 0601  Gross per 24 hour   Intake 2473.74 ml   Output 835 ml   Net " "1638.74 ml       Invasive Devices       Peripheral Intravenous Line  Duration             Peripheral IV 04/12/24 Right;Ventral (anterior) Forearm 2 days    Long-Dwell Peripheral IV (Midline) 04/13/24 Right Basilic 1 day    Peripheral IV 04/13/24 Right;Ventral (anterior) Forearm 1 day              Drain  Duration             Ileostomy RUQ 54 days    Urethral Catheter Three way 18 Fr. 14 days    Abscess Drain Buttock 4 days    NG/OG/Enteral Tube Nasogastric 16 Fr <1 day              Airway  Duration             Surgical Airway Shiley Cuffed 33 days                    Physical Exam:   General: Ill-appearing  Skin: Warm, dry, anicteric  HEENT: Normocephalic, atraumatic  CV: Tachycardic, no m/r/g  Pulm: CTA b/l, no inc WOB, vent as above  Abd: Soft, ND/NT, midline wound with dressing present  MSK: Symmetric, no edema, no tenderness, no deformity  Neuro: Intermittent and sedated    Lab, Imaging and other studies:I have personally reviewed pertinent lab results.  , CBC:   Lab Results   Component Value Date    WBC 15.66 (H) 04/15/2024    HGB 8.1 (L) 04/15/2024    HCT 23.4 (L) 04/15/2024    MCV 83 04/15/2024    PLT 72 (L) 04/15/2024    RBC 2.82 (L) 04/15/2024    MCH 28.7 04/15/2024    MCHC 34.6 04/15/2024    RDW 18.3 (H) 04/15/2024   , CMP:   Lab Results   Component Value Date    SODIUM 149 (H) 04/15/2024    K 3.7 04/15/2024     (H) 04/15/2024    CO2 16 (L) 04/15/2024    BUN 73 (H) 04/15/2024    CREATININE 1.23 04/15/2024    CALCIUM 9.7 04/15/2024    AST 15 04/15/2024    ALT 25 04/15/2024    ALKPHOS 292 (H) 04/15/2024    EGFR 48 04/15/2024   , Coagulation: No results found for: \"PT\", \"INR\", \"APTT\"  VTE Pharmacologic Prophylaxis: Sequential compression device (Venodyne)   VTE Mechanical Prophylaxis: sequential compression device  "

## 2024-04-15 NOTE — PROGRESS NOTES
Progress Note - General Surgery   Carla Hunt 59 y.o. female MRN: [id number]  Unit/Bed#: MICU 10 Encounter: [id number]    Assessment:  59F with COVID/strep pna, B cell lymphoma on rituxumab, CKD; prolonged hospitalization 2/2 acute hypoxic respiratory failure s/p MICU bedside trach, hospitalization complicated by cecal volvulus s/p ileocectomy, mucus fistula, end ileostomy on 3/13, anticipated poor wound healing on prolonged high dose steroids and neutropenia; candidemia with noted UGI bleed s/p EGD with esophageal ulcerations per GI  S/p Or 4/4 for midline wound exploration with mucus fistula and end ileostomy viable appearing and above the fascia, noted nonviable subq tissue debrided and skin bridge between ostomy and midline incision removed, small area of exposed bowel protected and stoma isolated  Course complicated by pelvic abscess s/p IR drain placement on 4/10 and persistent candidemia   DVT with presumed paradoxical emboli  Multiple embolic strokes  S/p aborted SATURNINO 4/11  S/p    Vent: CMV; 18/12/6/40%  Gtt: Levo 1  Tachycardia 110's, afebrile, normotensive   Ortiz 955 cc  NG with TF running at 45cc/hr  Wound manager 375 cc liquid stool and clots  IR drain 10 cc output purulent    WBC 12.4 from 13.9   Hb 6 from 7.1  Creatinine: 1.31 from 1.36    4/9 Blood cultures: Candida albicans  4/10 Blood cultures: Budding yeast  4/10 IR cultures: 1+ yeast  4/10 Lumbar puncture culture: No growth  4/12 BCx: Budding yeast    Plan:  Tube feeds as tolerated  Will continue twice daily dressing changes with attempted ileostomy isolation and packing of the rest of the wound; additional dressing changes as needed  Transfuse as needed  Appreciate GI recommendations; evaluate for ongoing GI bleeding  Appreciate ID recommendations, continue high-dose fluconazole, IV Zosyn,  Rest of care per ICU team    Subjective/Objective     Subjective: Ileostomy/packing required change overnight due to leakage and clot in bag.  Having  "output from ileostomy although majority is clot consistent with GI bleed.  Maintained on spontaneous tracheostomy settings    Objective:     Blood pressure 108/60, pulse (!) 107, temperature (!) 96.3 °F (35.7 °C), resp. rate 20, height 5' 8\" (1.727 m), weight 87 kg (191 lb 12.8 oz), SpO2 98%.,Body mass index is 29.16 kg/m².      Intake/Output Summary (Last 24 hours) at 4/15/2024 0000  Last data filed at 4/14/2024 2000  Gross per 24 hour   Intake 2151.16 ml   Output 775 ml   Net 1376.16 ml       Invasive Devices       Peripheral Intravenous Line  Duration             Peripheral IV 04/12/24 Right;Ventral (anterior) Forearm 2 days    Long-Dwell Peripheral IV (Midline) 04/13/24 Right Basilic 1 day    Peripheral IV 04/13/24 Right;Ventral (anterior) Forearm 1 day              Drain  Duration             Ileostomy RUQ 54 days    Urethral Catheter Three way 18 Fr. 14 days    Abscess Drain Buttock 4 days    NG/OG/Enteral Tube Nasogastric 16 Fr <1 day              Airway  Duration             Surgical Airway Shiley Cuffed 33 days                    Physical Exam:   Gen:    Ill-appearing  CV:      warm, well-perfused  Lungs: S (CMV) 16/400/6/40  Abd:     IR drain purulent, wound manager merrily clot, NG tube with TF@45, abdomen soft nondistended  Ext:      no CCE  Neuro: Opens eyes but does not follow commands    "

## 2024-04-15 NOTE — PLAN OF CARE
Problem: SAFETY ADULT  Goal: Patient will remain free of falls  Description: INTERVENTIONS:  - Educate patient/family on patient safety including physical limitations  - Instruct patient to call for assistance with activity   - Consult OT/PT to assist with strengthening/mobility   - Keep Call bell within reach  - Keep bed low and locked with side rails adjusted as appropriate  - Keep care items and personal belongings within reach  - Initiate and maintain comfort rounds  - Make Fall Risk Sign visible to staff  - Offer Toileting every 2 Hours, in advance of need  - Initiate/Maintain bed alarm  - Obtain necessary fall risk management equipment: non skid footwear  - Apply yellow socks and bracelet for high fall risk patients  - Consider moving patient to room near nurses station  Outcome: Progressing     Problem: RESPIRATORY - ADULT  Goal: Achieves optimal ventilation and oxygenation  Description: INTERVENTIONS:  - Assess for changes in respiratory status  - Assess for changes in mentation and behavior  - Position to facilitate oxygenation and minimize respiratory effort  - Oxygen administered by appropriate delivery if ordered  - Initiate smoking cessation education as indicated  - Encourage broncho-pulmonary hygiene including cough, deep breathe, Incentive Spirometry  - Assess the need for suctioning and aspirate as needed  - Assess and instruct to report SOB or any respiratory difficulty  - Respiratory Therapy support as indicated  Outcome: Progressing     Problem: SKIN/TISSUE INTEGRITY - ADULT  Goal: Skin Integrity remains intact(Skin Breakdown Prevention)  Description: Assess:  -Perform Flavio assessment every shift   -Clean and moisturize skin every day   -Inspect skin when repositioning, toileting, and assisting with ADLS  -Assess under medical devices such as ronny  every hour   -Assess extremities for adequate circulation and sensation     Bed Management:  -Have minimal linens on bed & keep smooth,  unwrinkled  -Change linens as needed when moist or perspiring  -Avoid sitting or lying in one position for more than 2 hours while in bed  -Keep HOB at 45 degrees     Toileting:  -Offer bedside commode  -Assess for incontinence every hour  -Use incontinent care products after each incontinent episode such as moisture barrier cream     Activity:  -Mobilize patient 3 times a day  -Encourage activity and walks on unit  -Encourage or provide ROM exercises   -Turn and reposition patient every 2 Hours  -Use appropriate equipment to lift or move patient in bed  -Instruct/ Assist with weight shifting every hour when out of bed in chair  -Consider limitation of chair time 2 hour intervals    Skin Care:  -Avoid use of baby powder, tape, friction and shearing, hot water or constrictive clothing  -Relieve pressure over bony prominences using allevyn   -Do not massage red bony areas    Next Steps:  -Teach patient strategies to minimize risks such as weight shifting    -Consider consults to  interdisciplinary teams such as PT  Outcome: Progressing

## 2024-04-15 NOTE — PROGRESS NOTES
Nutrition Follow-Up/Recommendations:    Pt no longer on minocycline or omeprazole requiring TF to be held.   If team would like to change to continuous, goal rate for Nepro would be 40mL/hr x24 hrs, continue Prosource and Refugio as ordered.     If planning to continue with 20-hour feed cycle, advance rate as tolerated back up to previous goal of 45mL/hr x20 hrs daily, + one packet Prosource Liquid Protein and one packet Refugio BID, to provide 1860 kcals, 93g protein, 954mL water including water for prosource and Refugio administration.     Adjustments in additional free water flushes per critical care.

## 2024-04-15 NOTE — PROGRESS NOTES
Progress Note - Infectious Disease   Carla Hunt 59 y.o. female MRN: [id number]  [facility]: [id number]      Impression/Recommendations:  Sepsis, recurrent since admission.    Due to COVID-19 infection, recurrent pneumonias, cecal volvulus status post surgery and wound dehiscence.  Most recently due to with persistent candidemia, intraabdominal abscess.  Remains critically ill              -Continue antibiotics, antifungals as below   -Monitor closely while on antibiotics for toxicities including diarrhea, rash, cytopenias, or kidney injury  -Follow temperatures closely  -Recheck CBC diff in AM to monitor infection   -Recheck CMO in AM to monitor renal, liver function  -Supportive care as per the primary service  -Follow-up pending cultures, drain culture     Intra-abdominal abscess.    In setting of prior abdominal surgery, ileostomy as below.  Status post IR drain placement to pelvic collection 4/10 which yielded pus.  Culture shows heavy growth of Candida albicans so likely a source of persistent fungemia.  Improved on repeat CT A/P 4/13.              -Continue IV fluconazole              -Continue IV Zosyn              -Follow drain output closely     Persistent C. Albicans candidemia.    Initial positive cultures 3/31 are growing Candida albicans, susceptible to fluconazole.  Suspect ileostomy retraction with wound contamination is the initial source.  Now with intraabdominal abscess as above.  TTE x 2 no vegetations but MRI brain now shows bilateral infarcts, consideration for possible septic emboli.  Ophthalmology evaluation negative for endophthalmitis.  Consider endovascular source of infection versus pelvic abscess.  SATURNINO unable to be performed as could not pass through the esophagus.  CTA without evidence of mycotic aneurysm.              -Continue IV fluconazole 800mg daily    -Continue micafungin 150mg IV daily              -Follow up repeat blood cultures 4/13   -Check repeat  blood cultures in AM              -Follow up repeat susceptibility testing              -Check repeat TTE to assess for valvular changes since SATURNINO cannot be performed     Acute hypoxic respiratory failure.  Status post trach.  Multifactorial, with both COVID and bacterial pneumonia contributing.  Also consider persistent inflammation, fibrosis as seems quite steroid responsive in past.  Was on high dose steroids nearly 3 months, now weaned off.  No active concern for pneumonia at this time.  Weaned to PSV.     Recurrent GI bleed, ulcerations.    In setting of gastritis, esophagitis, esophageal ulceration.  GMS, CMV/HSV stains negative on pathology.              -Check serial Hgb to monitor bleeding  -Continue PPI     Persistent encephalopathy.   Worsened 3/30. MRI brain shows multiple small bilateral foci of acute infarcts as described suggesting embolic etiology.  Status post lumbar puncture 4/10, CSF studies not consistent with meningitis.              -Follow mental status closely  -Neurology following     Elevated alk phos/T. bili.    May be related to fluconazole although would expect more elevation in AST/ALT.  Right upper quadrant ultrasound no significant abnormalities.  Improving              -Recheck CMP in AM to monitor LFTs     Recent recurrent bacterial pneumonia.    The patient has completed multiple treatment courses over the hospitalization. Prior BAL cultures with Pseudomonas and stenotrophomonas.  Unclear if pathogens or colonizers.  Repeat CT chest 4/5 with improving but persistent groundglass and consolidative opacities.     Recent severe COVID, present on admission.    Status post steroids, antivirals.  Rapid COVID antigen negative 3/25 and 3/27     Recent cecal volvulus  Noted on abdomen/pelvis CT 2/20.  Patient is status post exploratory laparotomy with ileocecectomy and end ileostomy creation 2/20.   End ileostomy is with ongoing ischemic changes, retraction of ileostomy and fecal  contamination through midline wound.  Status post washout .  Now complicated by abscess, candidemia as above.     B-cell lymphoma  On maintenance rituxan, which is currently postponed.  There is now a hypovascular mass of the thyroid gland enlarging on serial imaging, concern for lymphoma.  IR consulted for biopsy.     I discussed with Dr. Morfin the above plan to continue current antibiotics, antifungals, address GOC.  He agrees with the plan.     Antibiotics:  Fluconazole #13  Micafungin #2  Zosyn #13    Subjective/24 Hour Events:  Patient seen on AM rounds.   and brother at bedside.  Realize how ill she is and admit she wouldn't want to live in a SNF the rest of her life.  Went for repeat EGD which showed bleeding gastric ulcer.  No fevers.    Objective:  Vitals:  Temp:  [96.3 °F (35.7 °C)-99 °F (37.2 °C)] 96.6 °F (35.9 °C)  HR:  [102-121] 104  Resp:  [14-26] 17  BP: ()/(51-64) 113/55  SpO2:  [97 %-100 %] 98 %  Temp (24hrs), Av.5 °F (36.4 °C), Min:96.3 °F (35.7 °C), Max:99 °F (37.2 °C)  Current: Temperature: (!) 96.6 °F (35.9 °C)    Physical Exam:   General:  No acute distress  Psychiatric:  Unresponsive  Pulmonary:  Normal respiratory excursion without accessory muscle use  Abdomen:  Wound photos reviewed large open wound with visible bowels  Extremities:  No edema  Skin:  No rashes    Lab Results:  I have personally reviewed pertinent labs.  Results from last 7 days   Lab Units 04/15/24  0521 24  2157 24  0521 24  0554   SODIUM mmol/L 149* 147 153* 150*   POTASSIUM mmol/L 3.7 3.8 3.2* 3.5   CHLORIDE mmol/L 117* 116* 119* 118*   CO2 mmol/L 16* 15* 18* 17*   BUN mg/dL 73* 73* 76* 70*   CREATININE mg/dL 1.23 1.25 1.31* 1.36*   EGFR ml/min/1.73sq m 48 47 44 42   CALCIUM mg/dL 9.7 9.7 10.1 9.9   AST U/L 15  --  14 16   ALT U/L 25  --  25 31   ALK PHOS U/L 292*  --  307* 743*     Results from last 7 days   Lab Units 04/15/24  0521 24  2157 24  1653 24  0521    WBC Thousand/uL 15.66* 16.43*  --  12.42*   HEMOGLOBIN g/dL 8.1* 8.5* 9.4* 6.0*   PLATELETS Thousands/uL 72* 68*  --  77*     Results from last 7 days   Lab Units 04/14/24  0521 04/12/24  0521 04/10/24  2032 04/10/24  0542 04/09/24  0631 04/09/24  0447   BLOOD CULTURE  No Growth at 24 hrs.  No Growth at 24 hrs. Yeast species*  --  Candida albicans*  Candida albicans* Candida albicans* Candida albicans*   GRAM STAIN RESULT   --  Yeast*  Budding Yeast with Pseudomycelia* No polys seen*  1+ Yeast* Budding yeast*  Budding yeast* Budding Yeast with Pseudomycelia* Budding Yeast with Pseudomycelia*   BODY FLUID CULTURE, STERILE   --   --  3+ Growth of Candida albicans*  --   --   --        Imaging Studies:   I have personally reviewed pertinent imaging study reports and images in PACS.  CXR images reviewed no opacity or effusion    EKG, Pathology, and Other Studies:   I have personally reviewed pertinent reports.

## 2024-04-15 NOTE — ASSESSMENT & PLAN NOTE
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 Undergoing twice daily dressing changes.

## 2024-04-15 NOTE — RESPIRATORY THERAPY NOTE
04/15/24 0312   Respiratory Assessment   Assessment Type Assess only   General Appearance Sleeping   Respiratory Pattern Assisted   Chest Assessment Chest expansion symmetrical   Bilateral Breath Sounds Diminished;Clear   Cough None   Suction Trach   Resp Comments Pt. remains on documented PCV settings, tolerating well. No changes at this time.   O2 Device G5   Vent Information   Vent ID [id number]   Vent type Summers G5   Summers Vent Mode P-CMV/PCV+   $ Vent Daily Charge-Subsequent Yes   $ Pulse Oximetry Spot Check Charge Completed   P-CMV/PCV+ Settings   Resp Rate (BPM) 18 BPM   Pressure Control/Pinsp (cmH20) 16 cmH20   Insp Time (S) 0.8 sec   FiO2 (%) 40 %   PEEP (cmH2O) 6 cmH2O   I:E Ratio 1/3.2   Insp Resistance 3   P-ramp (ms) 100 (ms)   Flow Trigger (LPM) 5 LPM   Humidification Heater   Heater Temp 95 °F (35 °C)   P-CMV/PCV+ Actuals   Resp Rate (BPM) 21 BPM   VT (mL) 538 mL   MV 10.6   MAP (cmH2O) 11 cmH2O   Peak Pressure (cmH2O) 22 cmH2O   I:E Ratio (Obs) 1/2.8   Static Compliance (mL/cmH2O) 43.4 mL/cmH2O   Plateau Pressure (cmH2O) 23.1 cmH2O   Heater Temperature (Obs) 95 °F (35 °C)   P-CMV/PCV+ Alarms    High Peak Pressure (cmH2O) 40 cmH2O   Low Pressure (cmH2O) 5 cm H2O   High Resp Rate (BPM) 40 BPM   Low Resp Rate (BPM) 8 BPM   High MV (L/min) 20 L/min   Low MV (L/min) 4 L/min   High Spont VTE (mL) 1000 mL   Low Spont VTE (mL) 250 mL   Maintenance   Alarm (pink) cable attached No   Resuscitation bag with peep valve at bedside Yes   Water bag changed No   Circuit changed No   Surgical Airway Shiley Cuffed   Placement Date/Time: 03/12/24 1200   Tube Size: 8 mm  Type: Tracheostomy  Brand: Naveen  Style: Cuffed   Status Cuff Inflated   Ties Assessment Clean;Dry;Intact   Surgical Airway Cuff Pressure (cm H20)   (M<LT)   Equipment at bedside BVM;Wall Suction setup;Additional complete same size trach tube;Additional complete one size smaller trach tube;Obturator;Sterile saline;Additional inner cannula     RT  Ventilator Management Note  Carla Hunt 59 y.o. female MRN: [id number]  Unit/Bed#: [facility] 10 Encounter: [id number]      Daily Screen         4/14/2024 1919 4/14/2024 2328          Patient safety screen outcome:: Failed Failed      Not Ready for Weaning due to:: -- Underline problem not resolved                Physical Exam:   Assessment Type: Assess only  General Appearance: Sleeping  Respiratory Pattern: Assisted  Chest Assessment: Chest expansion symmetrical  Bilateral Breath Sounds: Diminished, Clear  R Breath Sounds: Inspiratory wheezes  Cough: None  Suction: Trach  O2 Device: G5      Resp Comments: Pt. remains on documented PCV settings, tolerating well. No changes at this time.

## 2024-04-15 NOTE — PROGRESS NOTES
Spiritual Care Progress Note    4/15/2024  Patient: Carla Hunt : 1965  Admission Date & Time: 1/10/2024 1941  MRN: [id number] [facility]: [id number]       followed-up with pt's  and brother. Pt's  reported continued distress over wife's situation but has been able to find some comfort in playing her favorite songs for her at the hospital. Pt not able to respond at this time. No needs expressed at this time.    Chaplains still remain available.       04/15/24 1300   Clinical Encounter Type   Visited With Patient and family together;Patient not available   Routine Visit Follow-up

## 2024-04-15 NOTE — UTILIZATION REVIEW
Continued Stay Review    Date:       for  4/12 4/13 4/14  and  4/15              Current Patient Class:  INpatient  Current Level of Care:  MICU    HPI:59 y.o. female initially admitted on  /10/24     Assessment/Plan:   4/12    Off  pressors  this am.  Opens  eyes  more this am.        Continue tube feed  at   45/hr.   Vac  intact  and   100 cc  brown liquid noted.   Continue  VAC  changes  Mon/Wed/Fri.   IR drain intact   and 10 cc purulent drainage noted.   Need to flush   drain    Q  8 hrs with 5cc  NS.    Tachycardic,  1  episode  of  hypotension  4/11, now resolved.  Ortiz catheter intact.  Remains intubated.  Continue  SATINDER.  Unsuccessful  SATURNINO  ( 4/11), unable to get into  esophagus.    S/P LP  ( 4/10)    and  CSF  with NG thus far.     Palliative care  following.    4/13  Remains intubated/trach intact.  Bleeding noted from stoma this am, likely  from   ileostomy.   Unclear source of bleeding.     Per  High volume  bleeding scan, likely  gastric  ulcer as source as there  is a  blush  on CTA.    Hemoglobin drop   from 8.8   to 6.7 in past  24 hrs.   No meaningful surgical intervention at this time.  Continue  SATINDER.   Has persistent  encephalopathy.   Monitor mental status closely.  Receiving  PRBC  this am.    Emergent  EGD  ( 4/13)  IMPRESSION:  Single ulcer in mid-esophagus with active oozing (Zaki 1B); hemostasis achieved with placement of 2 clips and epinephrine infiltration.  Severe ulcerations and esophagitis.  Significant blood clotting in the stomach obscuring visualization despite aggressive irrigation, suction and patient repositioning. Visualized areas showed multiple areas of linear ulcerations with clean base. Posterior wall of the stomach could not be adequately visualized.   Normal duodenum.  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      4/14  EGD   for ongoing  UGIB  MPRESSION:  Significant clotting in the stomach, clots were removed for evaluation of underlying mucosa.  Multiple  gastric ulcers visualized, one of the with adherant clot which was suctioned revealing spurting hemorrhage (Zaki 1A). Treated with placement of 4 clips, 2 cc epinephrine injection and bipolar cautery.  Attempted to pass the OVESCO clip but unsuccessful given UES stricture.  Previously treated esophageal ulcer without bleeding.   Good window for PEG if required in the future.  NG tube replaced.    Wound VAC    intact.  Remains on  vent support/trach.  Continue  SATINDER.   Monitor labs, F/U  serial blood cultures.  Continue  Vimpat, seizure precaution.    Off steroids.  SATURNINO    postponed per cardiology.    Received  1 U platelets  overnight.     Continue tube feeds for now.          4/15      Blood cultures  4/12  with yeast. Adding  IV  micafungin.  Possible source  intra abdominal collection.  Continue  all SATINDER.   F/U  cultures.  Monitor  drain output.    10 cc  purulent drainage  noted from IR drain.    Ortiz catheter intact and  drained  955 cc.  EGD  ( 4/14)  showed bleeding esophageal ulcer, gastritis/esophagitis. Remains unresponsive. NGT intact with tube feeds   45/hr.  Continue  BID  dressing changes with attempted  ileostomy isolation and packing of rest of wound.   Required  change during the night due to leakage  and  clot  in bag.  Trach intact/vent dependent  and maintained  on spontaneous trach settings. Continue to monitor labs.  Needs  peg at some point.Now weaned off steroids.   Holding  rituxubmab   in setting of  persistent  COVID  infection, now resolved.  Now off steroids.  Required  2 U PRBC  4/14  for acute drop  in hmgb.  Received  1  U Platelets.  Opens eyes, not  following commands.    Vital Signs:   97.2 °F (36.2 °C) Abnormal  105 18 118/61 83 100 % -- -- --    04/15/24 0730 97.7 °F (36.5 °C) 112 Abnormal  19 122/62 86 99 % -- -- --   04/15/24 0720 97.7 °F (36.5 °C) 112 Abnormal  19 125/60 86 99 % -- -- --   04/15/24 0702 -- -- -- -- -- 99 % -- -- --   04/15/24 0600 98.2 °F (36.8 °C) 116  Abnormal  24 Abnormal  129/64 89 99 % -- -- --   04/15/24 0500 98.2 °F (36.8 °C) 110 Abnormal  19 104/56 74 98 % -- -- --   04/15/24 0400 98.2 °F (36.8 °C) 110 Abnormal  20 102/51 73 99 % -- Ventilator Lying   04/15/24 0300 98.4 °F (36.9 °C) 112 Abnormal  18 107/55 76 99 % -- -- --   04/15/24 0200 98.8 °F (37.1 °C) 114 Abnormal  19 109/55 77 99 % -- -- --   04/15/24 0100 99 °F (37.2 °C) 116 Abnormal  19 99/54 71 98 % -- -- --   04/15/24 0000 97.7 °F (36.5 °C) 111 Abnormal  22 100/56 74 98 % -- Ventilator Lying       Pertinent Labs/Diagnostic Results:       Results from last 7 days   Lab Units 04/15/24  0521 04/14/24  2157 04/14/24  1653 04/14/24  0521 04/13/24  1945   WBC Thousand/uL 15.66* 16.43*  --  12.42* 13.09*   HEMOGLOBIN g/dL 8.1* 8.5* 9.4* 6.0* 7.1*   HEMATOCRIT % 23.4* 24.6* 27.2* 17.9* 21.4*   PLATELETS Thousands/uL 72* 68*  --  77* 43*   BANDS PCT % 53* 46*  --  36* 18*         Results from last 7 days   Lab Units 04/15/24  0521 04/14/24  2157 04/14/24  0521 04/13/24  0554 04/12/24  1656 04/12/24  0521 04/11/24  0502 04/10/24  0859   SODIUM mmol/L 149* 147 153* 150* 149* 147 144  --    POTASSIUM mmol/L 3.7 3.8 3.2* 3.5 3.7 3.1* 3.7  --    CHLORIDE mmol/L 117* 116* 119* 118* 116* 111* 111*  --    CO2 mmol/L 16* 15* 18* 17* 18* 18* 19*  --    ANION GAP mmol/L 16* 16* 16* 15* 15* 18* 14*  --    BUN mg/dL 73* 73* 76* 70* 66* 65* 65*  --    CREATININE mg/dL 1.23 1.25 1.31* 1.36* 1.27 1.26 1.19  --    EGFR ml/min/1.73sq m 48 47 44 42 46 47 50  --    CALCIUM mg/dL 9.7 9.7 10.1 9.9 10.0 10.2 9.9  --    CALCIUM, IONIZED mmol/L  --   --  1.39*  --   --   --   --  1.45*   MAGNESIUM mg/dL 1.7*  --  1.9 2.0  --  2.0 2.0  --    PHOSPHORUS mg/dL 6.0*  --  5.9* 5.1*  --  6.3* 5.6*  --      Results from last 7 days   Lab Units 04/15/24  0521 04/14/24  0521 04/13/24  0554 04/12/24  0521 04/11/24  0502 04/10/24  0543 04/09/24  0446   AST U/L 15 14 16 22 21   < > 23   ALT U/L 25 25 31 39 38   < > 35   ALK PHOS U/L 292* 307*  743* 845* 786*   < > 759*   TOTAL PROTEIN g/dL 3.7* 3.8* 4.0* 4.5* 4.1*   < > 4.3*   ALBUMIN g/dL 2.1* 2.5* 2.0* 2.5* 2.1*   < > 2.4*   TOTAL BILIRUBIN mg/dL 1.67* 1.48* 1.30* 1.84* 1.45*   < > 2.09*   BILIRUBIN DIRECT mg/dL  --   --   --   --   --   --  1.37*   AMMONIA umol/L  --  69  --  91*  --   --   --     < > = values in this interval not displayed.     Results from last 7 days   Lab Units 04/15/24  0525 04/15/24  0021 04/14/24  1916 04/14/24  1135 04/14/24  0539 04/13/24  2341 04/13/24  1925 04/13/24  1158 04/13/24  0542 04/13/24  0017 04/12/24  1811 04/12/24  1227   POC GLUCOSE mg/dl 165* 184* 165* 131 129 160* 192* 247* 265* 216* 221* 242*     Results from last 7 days   Lab Units 04/15/24  0521 04/14/24  2157 04/14/24  0521 04/13/24  0554 04/12/24  1656 04/12/24  0521 04/11/24  0502 04/10/24  0543 04/09/24  0446   GLUCOSE RANDOM mg/dL 179* 229* 127 254* 243* 139 132 166* 273*             Beta- Hydroxybutyrate   Date Value Ref Range Status   04/12/2024 1.24 (H) 0.02 - 0.27 mmol/L Final   04/09/2024 1.28 (H) 0.02 - 0.27 mmol/L Final   04/03/2024 1.10 (H) 0.02 - 0.27 mmol/L Final              Results from last 7 days   Lab Units 04/11/24  0502   CK TOTAL U/L <10*               Results from last 7 days   Lab Units 04/15/24  0555 04/14/24  0555 04/13/24  1519 04/13/24  1514 04/11/24 0555 04/09/24 0555   UNIT PRODUCT CODE  [id number]  [id number]  F1136K76  O1840I05  M4978M75  S7031I50 U4121B81  M8050S78 E4502S08  O6155W20 Y6863X28  B8077E36 J6461X20 C9655A57  G6789Q39  F6919D78   UNIT NUMBER  [id number]  [id number]  [id number]-*  [id number]  [id number]  [id number]  [id number]-*  [id number]-*  [id number]  [id number]  [id number]   UNITABO  A  O  A  A  A  A A  A A  A A  A A A  A  O   UNITRH  NEG  NEG  NEG  NEG  NEG  NEG POS  NEG NEG  NEG NEG  NEG NEG NEG   NEG  POS   CROSSMATCH  Compatible  Compatible  Compatible  Compatible  Compatible  Compatible Compatible  --  Compatible  Compatible  --  Compatible  Compatible   UNIT DISPENSE STATUS  Crossmatched  Presumed Trans  Presumed Trans  Presumed Trans  Crossmatched  Crossmatched Presumed Trans  Presumed Trans Return to Inv  Return to Inv Crossmatched  Crossmatched Presumed Trans Presumed Trans  Presumed Trans  Presumed Trans   UNIT PRODUCT VOL ml 350  250  350  350  350  350 234  350 350  350 350  350 241 350  350  300           Results from last 7 days   Lab Units 04/14/24  0521 04/12/24  0521 04/10/24  2032 04/10/24  0542 04/09/24  0631 04/09/24  0447   BLOOD CULTURE  Received in Microbiology Lab. Culture in Progress.  Received in Microbiology Lab. Culture in Progress.  --   --  Candida albicans*  Candida albicans* Candida albicans* Candida albicans*   GRAM STAIN RESULT   --  Budding Yeast with Pseudomycelia*  Yeast* No polys seen*  1+ Yeast* Budding yeast*  Budding yeast* Budding Yeast with Pseudomycelia* Budding Yeast with Pseudomycelia*   BODY FLUID CULTURE, STERILE   --   --  3+ Growth of Candida albicans*  --   --   --        Medications:   Scheduled Medications:  chlorhexidine, 15 mL, Mouth/Throat, Q12H SARTHKA  fluconazole, 800 mg, Intravenous, Q24H  insulin lispro, 1-6 Units, Subcutaneous, Q6H SARTHAK  lacosamide, 100 mg, Intravenous, Q12H  lactulose, 20 g, Oral, BID  micafungin, 100 mg, Intravenous, Q24H  nystatin, , Topical, BID  pantoprazole, 40 mg, Intravenous, Q12H SARTHAK  piperacillin-tazobactam, 4.5 g, Intravenous, Q8H  polyethylene glycol, 17 g, Per NG Tube, Daily  sodium chloride, 2 spray, Each Nare, BID  sodium chloride, 4 mL, Nebulization, TID      Continuous IV Infusions:  dexmedetomidine, 0.1-0.7 mcg/kg/hr, Intravenous, Titrated  dextrose, 50 mL/hr, Intravenous, Continuous  norepinephrine, 1-30 mcg/min, Intravenous, Titrated      PRN Meds:  acetaminophen, 650 mg, Oral, Q6H  PRN  fentaNYL, 50 mcg, Intravenous, Q1H PRN  ondansetron, 4 mg, Intravenous, Q6H PRN  sodium chloride, 1 Application, Nasal, Q1H PRN        Discharge Plan:  TBD    Network Utilization Review Department  ATTENTION: Please call with any questions or concerns to [phone] and carefully listen to the prompts so that you are directed to the right person. All voicemails are confidential.   For Discharge needs, contact Care Management DC Support Team at [phone] opt. [phone]  Send all requests for admission clinical reviews, approved or denied determinations and any other requests to dedicated fax number below belonging to the campus where the patient is receiving treatment. List of dedicated fax numbers for the Facilities:  FACILITY NAME UR FAX NUMBER   ADMISSION DENIALS (Administrative/Medical Necessity) [phone]   DISCHARGE SUPPORT TEAM (NETWORK) [phone]   PARENT CHILD HEALTH (Maternity/NICU/Pediatrics) [phone]   [facility] [phone]   [facility] [phone]   [facility] [phone]   [facility] [phone]   [facility] [phone]   [facility] [phone]   [facility] [phone]   [facility] [phone]   [facility] [phone]   [facility] [phone]   [facility] [phone]   [facility] [phone]

## 2024-04-15 NOTE — RESPIRATORY THERAPY NOTE
RT Ventilator Management Note  Carla Hunt 59 y.o. female MRN: [id number]  Unit/Bed#: [facility] 10 Encounter: [id number]      Daily Screen         4/14/2024  2328 4/15/2024  0702          Patient safety screen outcome:: Failed Passed      Not Ready for Weaning due to:: Underline problem not resolved --                Physical Exam:   Assessment Type: Assess only  General Appearance: Sleeping  Respiratory Pattern: Assisted  Chest Assessment: Chest expansion symmetrical  Bilateral Breath Sounds: Diminished, Clear  R Breath Sounds: Inspiratory wheezes  Cough: None  Suction: Trach  O2 Device: G5      Resp Comments: (P) Pt received on P-cmv, pt switched to SPONT, PS 8 PEEP 6 fio2 40%. Pt appears to be tolerating these settings well, will continue to monitor pt per resp protocol.

## 2024-04-16 LAB
ALBUMIN SERPL BCP-MCNC: 2.2 G/DL (ref 3.5–5)
ALP SERPL-CCNC: 480 U/L (ref 34–104)
ALT SERPL W P-5'-P-CCNC: 27 U/L (ref 7–52)
AMPAR1 AB CSF QL CBA IFA: NEGATIVE
AMPAR2 IGG CSF QL CBA IFA: NEGATIVE
AMPHIPHYSIN AB CSF QL IF: NEGATIVE
ANION GAP SERPL CALCULATED.3IONS-SCNC: 15 MMOL/L (ref 4–13)
ANION GAP SERPL CALCULATED.3IONS-SCNC: 16 MMOL/L (ref 4–13)
ANION GAP SERPL CALCULATED.3IONS-SCNC: 18 MMOL/L (ref 4–13)
ANISOCYTOSIS BLD QL SMEAR: PRESENT
AST SERPL W P-5'-P-CCNC: 15 U/L (ref 13–39)
BASOPHILS # BLD MANUAL: 0 THOUSAND/UL (ref 0–0.1)
BASOPHILS NFR MAR MANUAL: 0 % (ref 0–1)
BILIRUB SERPL-MCNC: 1.56 MG/DL (ref 0.2–1)
BUN SERPL-MCNC: 76 MG/DL (ref 5–25)
BUN SERPL-MCNC: 80 MG/DL (ref 5–25)
BUN SERPL-MCNC: 81 MG/DL (ref 5–25)
CALCIUM ALBUM COR SERPL-MCNC: 11.5 MG/DL (ref 8.3–10.1)
CALCIUM SERPL-MCNC: 10.1 MG/DL (ref 8.4–10.2)
CALCIUM SERPL-MCNC: 10.2 MG/DL (ref 8.4–10.2)
CALCIUM SERPL-MCNC: 9.9 MG/DL (ref 8.4–10.2)
CASPR2 AB CSF QL CBA IFA: NEGATIVE
CHLORIDE SERPL-SCNC: 117 MMOL/L (ref 96–108)
CHLORIDE SERPL-SCNC: 120 MMOL/L (ref 96–108)
CHLORIDE SERPL-SCNC: 121 MMOL/L (ref 96–108)
CO2 SERPL-SCNC: 13 MMOL/L (ref 21–32)
CO2 SERPL-SCNC: 14 MMOL/L (ref 21–32)
CO2 SERPL-SCNC: 15 MMOL/L (ref 21–32)
CREAT SERPL-MCNC: 1.4 MG/DL (ref 0.6–1.3)
CREAT SERPL-MCNC: 1.44 MG/DL (ref 0.6–1.3)
CREAT SERPL-MCNC: 1.46 MG/DL (ref 0.6–1.3)
CV2 AB CSF QL IF: NEGATIVE
DPPX IGG CSF QL IF: NEGATIVE
EOSINOPHIL # BLD MANUAL: 0.19 THOUSAND/UL (ref 0–0.4)
EOSINOPHIL NFR BLD MANUAL: 1 % (ref 0–6)
ERYTHROCYTE [DISTWIDTH] IN BLOOD BY AUTOMATED COUNT: 19.9 % (ref 11.6–15.1)
GABABR IGG CSF QL CBA IFA: NEGATIVE
GAD65 AB CSF IA-SCNC: NEGATIVE NMOL/L
GFR SERPL CREATININE-BSD FRML MDRD: 39 ML/MIN/1.73SQ M
GFR SERPL CREATININE-BSD FRML MDRD: 40 ML/MIN/1.73SQ M
GFR SERPL CREATININE-BSD FRML MDRD: 41 ML/MIN/1.73SQ M
GLIAL NUC TYPE 1 AB CSF QL IF: NEGATIVE
GLUCOSE SERPL-MCNC: 175 MG/DL (ref 65–140)
GLUCOSE SERPL-MCNC: 204 MG/DL (ref 65–140)
GLUCOSE SERPL-MCNC: 227 MG/DL (ref 65–140)
GLUCOSE SERPL-MCNC: 235 MG/DL (ref 65–140)
GLUCOSE SERPL-MCNC: 290 MG/DL (ref 65–140)
HCT VFR BLD AUTO: 21.9 % (ref 34.8–46.1)
HCT VFR BLD AUTO: 23.7 % (ref 34.8–46.1)
HGB BLD-MCNC: 7.4 G/DL (ref 11.5–15.4)
HGB BLD-MCNC: 8 G/DL (ref 11.5–15.4)
HU1 AB CSF QL IF: NEGATIVE
HU2 AB CSF QL IF: NEGATIVE
HU3 AB CSF QL IF: NEGATIVE
IGLON5 IGG CSF QL CBA IFA: NEGATIVE
IMP & REVIEW OF LAB RESULTS: NEGATIVE
IP3R 1 IGG CSF QL IF: NEGATIVE
LGI1 AB CSF QL CBA IFA: NEGATIVE
LYMPHOCYTES # BLD AUTO: 0 % (ref 14–44)
LYMPHOCYTES # BLD AUTO: 0 THOUSAND/UL (ref 0.6–4.47)
MA+TA AB CSF QL IF: NEGATIVE
MAGNESIUM SERPL-MCNC: 2.3 MG/DL (ref 1.9–2.7)
MCH RBC QN AUTO: 28.5 PG (ref 26.8–34.3)
MCHC RBC AUTO-ENTMCNC: 33.8 G/DL (ref 31.4–37.4)
MCV RBC AUTO: 84 FL (ref 82–98)
METAMYELOCYTES NFR BLD MANUAL: 2 % (ref 0–1)
MGLUR1 IGG CSF QL CBA IFA: NEGATIVE
MONOCYTES # BLD AUTO: 0 THOUSAND/UL (ref 0–1.22)
MONOCYTES NFR BLD: 0 % (ref 4–12)
NEUTROPHILS # BLD MANUAL: 18.87 THOUSAND/UL (ref 1.85–7.62)
NEUTS BAND NFR BLD MANUAL: 27 % (ref 0–8)
NEUTS SEG NFR BLD AUTO: 70 % (ref 43–75)
NMDAR IGG CSF QL IF: NEGATIVE
NRBC BLD AUTO-RTO: 11 /100 WBC (ref 0–2)
PCA-2 AB CSF QL IF: NEGATIVE
PCA-TR AB CSF QL CBA IFA: NEGATIVE
PCA-TR AB CSF QL IF: NEGATIVE
PHOSPHATE SERPL-MCNC: 6.3 MG/DL (ref 2.7–4.5)
PLATELET # BLD AUTO: 69 THOUSANDS/UL (ref 149–390)
PLATELET BLD QL SMEAR: ABNORMAL
POIKILOCYTOSIS BLD QL SMEAR: PRESENT
POLYCHROMASIA BLD QL SMEAR: PRESENT
POTASSIUM SERPL-SCNC: 3.4 MMOL/L (ref 3.5–5.3)
POTASSIUM SERPL-SCNC: 3.5 MMOL/L (ref 3.5–5.3)
POTASSIUM SERPL-SCNC: 3.6 MMOL/L (ref 3.5–5.3)
PROT SERPL-MCNC: 4 G/DL (ref 6.4–8.4)
PTH RELATED PROT SERPL-SCNC: <2 PMOL/L
PURKINJE CELLS AB CSF QL IF: NEGATIVE
RBC # BLD AUTO: 2.81 MILLION/UL (ref 3.81–5.12)
RBC MORPH BLD: PRESENT
SCAN RESULT: NORMAL
SODIUM SERPL-SCNC: 148 MMOL/L (ref 135–147)
SODIUM SERPL-SCNC: 150 MMOL/L (ref 135–147)
SODIUM SERPL-SCNC: 151 MMOL/L (ref 135–147)
WBC # BLD AUTO: 19.45 THOUSAND/UL (ref 4.31–10.16)
ZIC4 ANTIBODY, CSF: NEGATIVE

## 2024-04-16 PROCEDURE — 80048 BASIC METABOLIC PNL TOTAL CA: CPT | Performed by: STUDENT IN AN ORGANIZED HEALTH CARE EDUCATION/TRAINING PROGRAM

## 2024-04-16 PROCEDURE — 94664 DEMO&/EVAL PT USE INHALER: CPT

## 2024-04-16 PROCEDURE — 82948 REAGENT STRIP/BLOOD GLUCOSE: CPT

## 2024-04-16 PROCEDURE — 99233 SBSQ HOSP IP/OBS HIGH 50: CPT | Performed by: STUDENT IN AN ORGANIZED HEALTH CARE EDUCATION/TRAINING PROGRAM

## 2024-04-16 PROCEDURE — 99232 SBSQ HOSP IP/OBS MODERATE 35: CPT | Performed by: INTERNAL MEDICINE

## 2024-04-16 PROCEDURE — 85018 HEMOGLOBIN: CPT | Performed by: STUDENT IN AN ORGANIZED HEALTH CARE EDUCATION/TRAINING PROGRAM

## 2024-04-16 PROCEDURE — 87040 BLOOD CULTURE FOR BACTERIA: CPT | Performed by: INTERNAL MEDICINE

## 2024-04-16 PROCEDURE — 99233 SBSQ HOSP IP/OBS HIGH 50: CPT | Performed by: PHYSICIAN ASSISTANT

## 2024-04-16 PROCEDURE — 94640 AIRWAY INHALATION TREATMENT: CPT

## 2024-04-16 PROCEDURE — C9113 INJ PANTOPRAZOLE SODIUM, VIA: HCPCS | Performed by: EMERGENCY MEDICINE

## 2024-04-16 PROCEDURE — 85027 COMPLETE CBC AUTOMATED: CPT

## 2024-04-16 PROCEDURE — 99024 POSTOP FOLLOW-UP VISIT: CPT | Performed by: SURGERY

## 2024-04-16 PROCEDURE — 80053 COMPREHEN METABOLIC PANEL: CPT

## 2024-04-16 PROCEDURE — 94760 N-INVAS EAR/PLS OXIMETRY 1: CPT

## 2024-04-16 PROCEDURE — 85007 BL SMEAR W/DIFF WBC COUNT: CPT

## 2024-04-16 PROCEDURE — 85014 HEMATOCRIT: CPT | Performed by: STUDENT IN AN ORGANIZED HEALTH CARE EDUCATION/TRAINING PROGRAM

## 2024-04-16 PROCEDURE — 84100 ASSAY OF PHOSPHORUS: CPT

## 2024-04-16 PROCEDURE — 83735 ASSAY OF MAGNESIUM: CPT

## 2024-04-16 PROCEDURE — 99233 SBSQ HOSP IP/OBS HIGH 50: CPT | Performed by: INTERNAL MEDICINE

## 2024-04-16 PROCEDURE — C9254 INJECTION, LACOSAMIDE: HCPCS | Performed by: STUDENT IN AN ORGANIZED HEALTH CARE EDUCATION/TRAINING PROGRAM

## 2024-04-16 PROCEDURE — 94003 VENT MGMT INPAT SUBQ DAY: CPT

## 2024-04-16 RX ORDER — ALBUTEROL SULFATE 2.5 MG/3ML
2.5 SOLUTION RESPIRATORY (INHALATION)
Status: DISCONTINUED | OUTPATIENT
Start: 2024-04-16 | End: 2024-04-16

## 2024-04-16 RX ORDER — LEVALBUTEROL INHALATION SOLUTION 1.25 MG/3ML
1.25 SOLUTION RESPIRATORY (INHALATION)
Status: DISCONTINUED | OUTPATIENT
Start: 2024-04-16 | End: 2024-04-18

## 2024-04-16 RX ORDER — ALBUMIN (HUMAN) 12.5 G/50ML
25 SOLUTION INTRAVENOUS ONCE
Status: COMPLETED | OUTPATIENT
Start: 2024-04-16 | End: 2024-04-16

## 2024-04-16 RX ORDER — SODIUM BICARBONATE 650 MG/1
650 TABLET ORAL
Status: DISCONTINUED | OUTPATIENT
Start: 2024-04-16 | End: 2024-04-18

## 2024-04-16 RX ORDER — DEXTROSE MONOHYDRATE 50 MG/ML
75 INJECTION, SOLUTION INTRAVENOUS CONTINUOUS
Status: DISCONTINUED | OUTPATIENT
Start: 2024-04-16 | End: 2024-04-17

## 2024-04-16 RX ORDER — ALBUTEROL SULFATE 2.5 MG/3ML
2.5 SOLUTION RESPIRATORY (INHALATION) EVERY 4 HOURS PRN
Status: DISCONTINUED | OUTPATIENT
Start: 2024-04-16 | End: 2024-04-18

## 2024-04-16 RX ORDER — POTASSIUM CHLORIDE 20MEQ/15ML
40 LIQUID (ML) ORAL ONCE
Status: COMPLETED | OUTPATIENT
Start: 2024-04-16 | End: 2024-04-16

## 2024-04-16 RX ORDER — SODIUM CHLORIDE, SODIUM GLUCONATE, SODIUM ACETATE, POTASSIUM CHLORIDE, MAGNESIUM CHLORIDE, SODIUM PHOSPHATE, DIBASIC, AND POTASSIUM PHOSPHATE .53; .5; .37; .037; .03; .012; .00082 G/100ML; G/100ML; G/100ML; G/100ML; G/100ML; G/100ML; G/100ML
250 INJECTION, SOLUTION INTRAVENOUS ONCE
Status: COMPLETED | OUTPATIENT
Start: 2024-04-17 | End: 2024-04-17

## 2024-04-16 RX ADMIN — INSULIN LISPRO 2 UNITS: 100 INJECTION, SOLUTION INTRAVENOUS; SUBCUTANEOUS at 18:17

## 2024-04-16 RX ADMIN — PIPERACILLIN SODIUM AND TAZOBACTAM SODIUM 4.5 G: 36; 4.5 INJECTION, POWDER, LYOPHILIZED, FOR SOLUTION INTRAVENOUS at 00:00

## 2024-04-16 RX ADMIN — PANTOPRAZOLE SODIUM 40 MG: 40 INJECTION, POWDER, FOR SOLUTION INTRAVENOUS at 08:19

## 2024-04-16 RX ADMIN — ALBUMIN (HUMAN) 25 G: 0.25 INJECTION, SOLUTION INTRAVENOUS at 23:30

## 2024-04-16 RX ADMIN — PIPERACILLIN SODIUM AND TAZOBACTAM SODIUM 4.5 G: 36; 4.5 INJECTION, POWDER, LYOPHILIZED, FOR SOLUTION INTRAVENOUS at 08:00

## 2024-04-16 RX ADMIN — POLYETHYLENE GLYCOL 3350 17 G: 17 POWDER, FOR SOLUTION ORAL at 08:19

## 2024-04-16 RX ADMIN — SODIUM BICARBONATE 650 MG TABLET 650 MG: at 17:12

## 2024-04-16 RX ADMIN — MICAFUNGIN SODIUM 150 MG: 50 INJECTION, POWDER, LYOPHILIZED, FOR SOLUTION INTRAVENOUS at 11:25

## 2024-04-16 RX ADMIN — ALBUTEROL SULFATE 2.5 MG: 2.5 SOLUTION RESPIRATORY (INHALATION) at 13:22

## 2024-04-16 RX ADMIN — Medication 2 SPRAY: at 10:29

## 2024-04-16 RX ADMIN — POTASSIUM CHLORIDE 40 MEQ: 1.5 SOLUTION ORAL at 08:19

## 2024-04-16 RX ADMIN — LACOSAMIDE 100 MG: 10 INJECTION, SOLUTION INTRAVENOUS at 10:30

## 2024-04-16 RX ADMIN — NYSTATIN: 100000 POWDER TOPICAL at 17:12

## 2024-04-16 RX ADMIN — INSULIN LISPRO 3 UNITS: 100 INJECTION, SOLUTION INTRAVENOUS; SUBCUTANEOUS at 12:49

## 2024-04-16 RX ADMIN — CHLORHEXIDINE GLUCONATE 0.12% ORAL RINSE 15 ML: 1.2 LIQUID ORAL at 08:19

## 2024-04-16 RX ADMIN — HEPARIN SODIUM 5000 UNITS: 5000 INJECTION INTRAVENOUS; SUBCUTANEOUS at 14:46

## 2024-04-16 RX ADMIN — SODIUM CHLORIDE SOLN NEBU 3% 4 ML: 3 NEBU SOLN at 07:13

## 2024-04-16 RX ADMIN — DEXTROSE 100 ML/HR: 5 SOLUTION INTRAVENOUS at 17:12

## 2024-04-16 RX ADMIN — HEPARIN SODIUM 5000 UNITS: 5000 INJECTION INTRAVENOUS; SUBCUTANEOUS at 06:01

## 2024-04-16 RX ADMIN — NYSTATIN: 100000 POWDER TOPICAL at 08:20

## 2024-04-16 RX ADMIN — LACTULOSE 20 G: 20 SOLUTION ORAL at 08:19

## 2024-04-16 RX ADMIN — LEVALBUTEROL HYDROCHLORIDE 1.25 MG: 1.25 SOLUTION RESPIRATORY (INHALATION) at 20:31

## 2024-04-16 RX ADMIN — CHLORHEXIDINE GLUCONATE 0.12% ORAL RINSE 15 ML: 1.2 LIQUID ORAL at 23:09

## 2024-04-16 RX ADMIN — Medication 2 SPRAY: at 23:10

## 2024-04-16 RX ADMIN — HEPARIN SODIUM 5000 UNITS: 5000 INJECTION INTRAVENOUS; SUBCUTANEOUS at 23:09

## 2024-04-16 RX ADMIN — INSULIN LISPRO 1 UNITS: 100 INJECTION, SOLUTION INTRAVENOUS; SUBCUTANEOUS at 06:01

## 2024-04-16 RX ADMIN — Medication 800 MG: at 08:00

## 2024-04-16 RX ADMIN — PANTOPRAZOLE SODIUM 40 MG: 40 INJECTION, POWDER, FOR SOLUTION INTRAVENOUS at 23:09

## 2024-04-16 RX ADMIN — LACTULOSE 20 G: 20 SOLUTION ORAL at 17:12

## 2024-04-16 RX ADMIN — SODIUM CHLORIDE SOLN NEBU 3% 4 ML: 3 NEBU SOLN at 13:04

## 2024-04-16 RX ADMIN — LACOSAMIDE 100 MG: 10 INJECTION, SOLUTION INTRAVENOUS at 23:07

## 2024-04-16 NOTE — RESPIRATORY THERAPY NOTE
Resp Vent Note   04/16/24 0714   Respiratory Assessment   Assessment Type During-treatment   General Appearance Sedated   Respiratory Pattern Normal;Spontaneous   Chest Assessment Chest expansion symmetrical   Bilateral Breath Sounds Diminished   Cough None   Suction Trach   Resp Comments Recieved pt on PS 8/6 40%, no vent changes made at this time, udn tx give, will cont to eh pt per resp prot.   O2 Device G5 vent   Vent Information   Vent ID [id number]   Vent type Summers G5   Summers Vent Mode SPONT   $ Pulse Oximetry Spot Check Charge Completed   SPONT Settings   FIO2 (%) 40 %   PEEP (cmH2O) 6 cmH2O   Pressure Support (cmH2O) 8 cmH20   Flow Trigger (LPM) 5 LPM   P-ramp (ms) 100 ms   ETS  (%) 25 %   Humidification Heater   Heater Temp 95 °F (35 °C)   SPONT Actuals   Resp Rate (BPM) 18 BPM   VT (mL) 493 mL   MV (Obs) 9.7   MAP (cmH2O) 7.9 cmH2O   Peak Pressure (cmH2O) 18 cmH2O   I:E Ratio (Obs) 1:3.6   RSBI (f/vt) 46 f/Vt   Heater Temperature (Obs) 94.8 °F (34.9 °C)   SPONT Alarms   High Peak Pressure (cmH20) 40 cmH2O   High Resp Rate (BPM) 40 BPM   High MV (L/min) 20 L/min   Low MV (L/min) 3 L/min   High Spont VTE (mL) 1000 mL   Low Spont VTE (mL) 250 mL   Apnea Time (S) 20 S   SPONT Apnea Settings   Resp Rate (BPM) 12 BPM  (pcontrol of 15)   FIO2 (%) 40 %   %TI (%) 0.7 %   Apnea Time (S) 20 S   Maintenance   Alarm (pink) cable attached No   Resuscitation bag with peep valve at bedside Yes   Water bag changed Yes   Circuit changed No   Daily Screen   Patient safety screen outcome: Passed   Not Ready for Weaning due to: Underline problem not resolved   IHI Ventilator Associated Pneumonia Bundle   Daily Assessment of Readiness to Extubate Yes   Surgical Airway Shiley Cuffed   Placement Date/Time: 03/12/24 1200   Tube Size: 8 mm  Type: Tracheostomy  Brand: Naveen  Style: Cuffed   Status Cuff Inflated;Secured   Site Assessment Dry   Ties Assessment Clean;Dry;Intact   Surgical Airway Cuff Pressure (color) Green    Equipment at bedside BVM;Wall Suction setup;Additional complete same size trach tube;Additional complete one size smaller trach tube;Obturator;Sterile saline;Additional inner cannula

## 2024-04-16 NOTE — ASSESSMENT & PLAN NOTE
Multi-specialty team treating individual factors that may contribute towards AMS. Provigil held at this time.  Identified to have fungemia, treatment started. CRRT initiated on 4/4 (since d/c as no improvement with mental status)  Unfortunately, Carla remains encephalopathic, not interactive during time of encounter.  CT head done 4/9/2024 revealed new hemorrhagic stroke  MRI brain done 4/10/2024 revealed numerous new infarcts and confirmed small hemorrhagic area  Patient's spouse continues to await improvement in mental status as contributing factors are optimized.   Patient has been opening eyes and tracking but not following commands or any purposeful movements.

## 2024-04-16 NOTE — ASSESSMENT & PLAN NOTE
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 Undergoing twice daily dressing changes and malecot placed.   98

## 2024-04-16 NOTE — PROGRESS NOTES
[facility]  Progress Note: Critical Care  Name: Carla Hunt 59 y.o. 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  LP 4/10/24: ME panel negative, autoimmune panel negative, CSF cx and gram stain negative.   Azotemia d/t GIB with elevated ammonia s/p EGD x2 and start lactulose to goal 2-3 Bms per day.   CVA  Most recent imaging 4/10/24: confirms likely [septic] embolic strokes from prior scans + several new small areas of infarction + a small new hemorrhage in the left inferior parietal lobe.  SATURNINO requested to assess for endocarditis in light of brain MRI concerning for septic emboli in setting of fungemia, but aborted due to difficult access to esophagus despite visual aid by bronchoscope.  F/up TTE without evidence of valvular vegetations  Patient does have a PFO on bubble study with a DVT in the RLE which also contributes to stroke risk   Holding ASA, c/w DVT ppx with heparin if platelets >50K  Hx of Seizure  Continue home Vimpat  S/p partial temporal lobe resection in 2007   Anxiety   ICU delirium precautions.      CV:  Sinus tachycardia  Etiology likely secondary to infectious processes (fungemia, intra-abdominal infection related to ostomy, pneumonia) which are being addressed  Known PFO  Most recent ECHO 4/15/24: Ef 65%, unable to assess DD, mild AI     Pulm:  Hypoxemic respiratory failure s/p trach;  Hx of COVID pneumonia s/p steroids and remdesivir   Hx of recurrent bacterial pna, + stenotrophomona s/p tx with minocycline  SBT can do pressure support qhs    GI:  Cecal volvulus s/p ileectomy with poor wound healing  Esophageal and gastric ulcers, CMV/HSV negative  S/p EGD x2 with clips, epi, and cautery  Protonix 40 mg BID  Intra-abdominal abscess s/p IR drain with purulent drainage and candida albicans  See plan under ID  C/w zosyn, IV fluconazole, micfungin   ID following  General surgery following  Elevated ammonia  D/t high dose IV fluconazole, c/w lactulose to goal 2-3 Bms per da     :  Uremia  D/t UGIB s/p EGD x2 with clipping, epi, and cautry  Metabolic acidosis  When sodium normalizes, consider sodium bicarb gtt   Hypernatremia   Off D5W; increase FWF  Elevated Cr  Persistently off CRRT  Monitor Cr  Closely monitor UOP, goal 1.4L     F/E/N:  F: consider D5W if Na does not improve with FWF; free water deficit of 3.1L.  E: monitor and replete for goal K>4, P>3, Mg>2  N: tube feeds with Nepro 45 cc/h, Prosource liquid protein to 1 packet, Refugio BID to support wound healing, 200 cc FWF q4h        Heme/Onc:  Pancytopenia, improving with intermittent plt/prbc's transfusion, s/p Filgastrim x3  In setting of hx of B cell lymphoma, prior chemo, Rituxan therapy, prior abx and present meds including lamictal  Hemo onc consulted, empirically given Filgastrim 300mg qd x3d (completed 3/30); IR bone marrow bx deferred by heme-onc   Lamictal switched to Vimpat in consultation with neuro as above due to potential contribution to pancytopenia  Trend CBC and diff daily, neutropenic precautions for ANC <500     2. Acute on chronic  anemia from UGIB  Patient had significant blood loss from ostomy site 3/20, resulting in hemorraghic shock, improved with blood transfusion; hemostasis achieved after bleeding source identified and sutured. Another large vol danie bloody output from osotomy on 3/21, bleeding source within ostomy site, sutured.   Bleeding esophageal and gastric ulcers s/p clipping, epi, and cautery    Trend CBC, transfuse Transfuse with leukoreduced and irradiated RBCs to maintain Hgb>7     3. Thrombocytopenia  Likely multifactorial including imparied production from marrow dysfunction in setting of persistent inflammation; RI low suggestive of hyperproliferation, hx of B-cell lymphoma, recent infections including persistent COVID, PNA treated, CMV negative. No known history of vWD/congenital disorders. No liver dysfunction; recent hepatic profile unremarkable. HIT workup negative, Hemolytic workup negative. No s/s DIC. No mechanical valves.  Transfuse with leukoreduced and irradiated PLTs if count <20k due to increased risk of bleeding   ONLY transfuse Plt if <20k and actively bleeding  Hold DVT ppx if platelets <50K    4. B cell lymphoma  [facility]  S/P 6 cycles of chemotherapy in 20222  Maintained on Rituxan for 2 year course PTA, started May 2022, last dose was 12/2024, treatment currently on hold in setting of persistent COVID-19 infection  Anti-Ritux Ab negative     Endo:  Steroid hyperglycemia and diabetes mellitus type 2, A1c at 6.6 from 01/2024  Goal -180  Utilize SSI no insulin gtt     2. R thyroid gland enlargement  Seen on CT 4/5  Follow up US 4/5- Limited exam due to overlying tracheostomy tube and central line catheter. Heterogeneous lobular appearance to the remaining thyroid gland, nonspecific, question sequela of thyroiditis. No discrete nodule identified.   T4/TSH unremarkable   Will need follow up thyroid US sometime week of 4/15 to follow up     ID:  Fungemia   Initial cx positive 3/31  with candida albicans started on IV high dose fluoconazole. Repeat cxs positive. Found to have intra-abdominal abscess s/p IR drainage with pus and cultures growing candida and likely the source.  Added micafungin d/t persistent positive cultures.   Monitor LFTs  F/up repeat blood cultures    Recurrent bacterial pneumonia with BAL cultures pseudomonas and stenotphomonas.   Recent severe COVID infection, s/p steroids, antivirals      MSK/Skin:  Midline incision wound with poor wound healing  S/p OR 4/4 for midline wound exploration with mucus fistula and end ileostomy, nonviable subq tissue debrided  General surgery following, wound dressing changes per surgery.   2. Critical illness myopathy    Disposition: Critical care    ICU Core Measures     Vented Patient  VAP Bundle  VAP bundle ordered     A: Assess, Prevent, and Manage Pain Has pain been assessed? Yes  Need for changes to pain regimen? No   B: Both Spontaneous Awakening Trials (SATs) and Spontaneous Breathing Trials (SBTs) Plan to perform spontaneous awakening trial today? N/A   Plan to perform spontaneous breathing trial today? N/A   Obvious barriers to extubation? NA   C: Choice of Sedation RASS Goal: 0 Alert and Calm or N/A patient not on sedation  Need for changes to sedation or analgesia regimen? NA   D: Delirium CAM-ICU: Negative   E: Early Mobility  Plan for early mobility? Yes   F: Family Engagement Plan for family engagement today? Yes       Antibiotic Review: Patient on appropriate coverage based on culture data. , Awaiting culture results. , and Infectious disease consulted    Review of Invasive Devices:    Ortiz Plan: Continue for accurate I/O monitoring for 48 hours        Prophylaxis:  VTE VTE covered by:  heparin (porcine), Subcutaneous, 5,000 Units at 04/16/24 0601       Stress Ulcer  covered bypantoprazole (PROTONIX) injection 40 mg [[id number]]        Significant 24hr Events     24hr events: no overnight events.      Subjective     Review of  Systems     Objective                            Vitals I/O      Most Recent Min/Max in 24hrs   Temp (!) 96.5 °F (35.8 °C) Temp  Min: 96.1 °F (35.6 °C)  Max: 99.9 °F (37.7 °C)   Pulse 101 Pulse  Min: 98  Max: 117   Resp (!) 27 Resp  Min: 14  Max: 31   /55 BP  Min: 89/51  Max: 131/65   O2 Sat 99 % SpO2  Min: 97 %  Max: 100 %      Intake/Output Summary (Last 24 hours) at 4/16/2024 0718  Last data filed at 4/16/2024 0401  Gross per 24 hour   Intake 1274.17 ml   Output 1280 ml   Net -5.83 ml       Diet Enteral/Parenteral; Tube Feeding No Oral Diet; Nepro; Cyclic; 10; 20 hours; Prosource Protein Liquid - One Packet; Refugio Unflavored - One Packet; BID; 200; Water; Every 6 hours    Invasive Monitoring           Physical Exam   Physical Exam  Eyes:      Pupils: Pupils are equal, round, and reactive to light.   Skin:     General: Skin is warm.   Cardiovascular:      Rate and Rhythm: Normal rate and regular rhythm.      Pulses: Normal pulses.   Abdominal: General: An ostomy site is present. There is ostomy site.  Constitutional:       General: She is not in acute distress.     Appearance: She is ill-appearing.   Pulmonary:      Effort: Pulmonary effort is normal. No respiratory distress.      Breath sounds: No wheezing or rales.   Genitourinary/Anorectal:  Ortiz present.          Diagnostic Studies      EKG: n/a  Imaging:  I have personally reviewed pertinent reports.   and I have personally reviewed pertinent films in PACS     Medications:  Scheduled PRN   chlorhexidine, 15 mL, Q12H SARTHAK  fluconazole, 800 mg, Q24H  heparin (porcine), 5,000 Units, Q8H SARTHAK  insulin lispro, 1-6 Units, Q6H SARTHAK  lacosamide, 100 mg, Q12H  lactulose, 20 g, BID  micafungin, 150 mg, Q24H  nystatin, , BID  pantoprazole, 40 mg, Q12H SARTHAK  piperacillin-tazobactam, 4.5 g, Q8H  polyethylene glycol, 17 g, Daily  potassium chloride, 40 mEq, Once  sodium chloride, 2 spray, BID  sodium chloride, 4 mL, TID      acetaminophen, 650 mg, Q6H PRN  fentaNYL, 50  mcg, Q1H PRN  ondansetron, 4 mg, Q6H PRN  sodium chloride, 1 Application, Q1H PRN       Continuous          Labs:    CBC    Recent Labs     04/15/24  1733 04/16/24  0601   WBC 18.17* 19.45*   HGB 8.0* 8.0*   HCT 23.5* 23.7*   PLT 77* 69*   BANDSPCT 39* 27*     BMP    Recent Labs     04/15/24  0521 04/16/24  0601   SODIUM 149* 150*   K 3.7 3.4*   * 117*   CO2 16* 15*   AGAP 16* 18*   BUN 73* 76*   CREATININE 1.23 1.44*   CALCIUM 9.7 10.1       Coags    No recent results     Additional Electrolytes  Recent Labs     04/15/24  0521 04/16/24  0601   MG 1.7* 2.3   PHOS 6.0* 6.3*          Blood Gas    No recent results  No recent results LFTs  Recent Labs     04/15/24  0521 04/16/24  0601   ALT 25 27   AST 15 15   ALKPHOS 292* 480*   ALB 2.1* 2.2*   TBILI 1.67* 1.56*       Infectious  No recent results  Glucose  Recent Labs     04/14/24  2157 04/15/24  0521 04/16/24  0601   GLUC 229* 179* 175*               Susan Prince DO

## 2024-04-16 NOTE — SOCIAL WORK
The Palliative SW and NEGRITA Ramirez met with the pt at bedside, her  and brother were present.  They were playing music from the pt play list.  Her eyes were open but she was not follow commands.  She was not able to squeeze the hands of the provider or her brother.  The pt  shared the reports he received and also stated the pt doctor would like her in a seated position as often as possible.  The pt  states he will take it one day at a time.  He has seen some medical improvement.     The Palliative team will continue to support the pt and her family.  The pt family thanked the team for their visit.

## 2024-04-16 NOTE — ASSESSMENT & PLAN NOTE
Regan continues to be consistently present and invested in patient's care.   Regan and brother (Shahriar) at bedside today  Also supported by her daughter, son, and robust  group of friends, brother, and nat[place] community  Decisional apparatus:  Patient is not competent on my exam today.  If competence is lost, patient's substitute decision maker would default to spouse Min by [place] Act 169.  Advance Directive / Living Will / POLST:  None on file, unable to complete  Palliative care MSW continues to follow  Pastoral care following for support

## 2024-04-16 NOTE — UTILIZATION REVIEW
"Continued Stay Review    Date: 04/16                          Current Patient Class: IP  Current Level of Care: Level 2 stepdown/HOT    HPI:59 y.o. female initially admitted on 01/10     Assessment/Plan: No acute issues ovn, this am, slowly awakening and trying to tract.   Labs: White blood cell 19, Bands 27, Sodium 158, Anion gap 18.  On SBT- try trach collar trial, if any distress utilize pressure control at night, stop hypertonic saline - use AMINTA nebs . Cont Micafungin and fluconazole, stop Zosyn per ID as it will help w/ her Na. DVT prophylaxis, CBC trend.  q4, repeat BMP 2 pm and consider D5W. Continue lactulose, PPI every 12 hours, stop MiraLAX due to ostomy output,. Advance TF as tolerated. BID dressing changes to abdominal wound, malecot for isolation of stoma. IV PPI.    Vital Signs: /53   Pulse 102   Temp (!) 96.8 °F (36 °C)   Resp (!) 33   Ht 5' 8\" (1.727 m)   Wt 88 kg (194 lb)   SpO2 97%   BMI 29.50 kg/m²       Pertinent Labs/Diagnostic Results:   04/16 US Thyroid:  Limited examination. Heterogeneous appearance of the right thyroid lobe without discrete dominant nodule identified.           Results from last 7 days   Lab Units 04/16/24  0601 04/15/24  1733 04/15/24  0521 04/14/24  2157 04/14/24  1653   WBC Thousand/uL 19.45* 18.17* 15.66* 16.43*  --    HEMOGLOBIN g/dL 8.0* 8.0* 8.1* 8.5* 9.4*   HEMATOCRIT % 23.7* 23.5* 23.4* 24.6* 27.2*   PLATELETS Thousands/uL 69* 77* 72* 68*  --    BANDS PCT % 27* 39* 53* 46*  --          Results from last 7 days   Lab Units 04/16/24  1446 04/16/24  0601 04/15/24  0521 04/14/24  2157 04/14/24  0521 04/13/24  0554 04/12/24  1656 04/12/24  0521 04/11/24  0502 04/10/24  0859   SODIUM mmol/L 151* 150* 149* 147 153* 150*   < > 147   < >  --    POTASSIUM mmol/L 3.6 3.4* 3.7 3.8 3.2* 3.5   < > 3.1*   < >  --    CHLORIDE mmol/L 121* 117* 117* 116* 119* 118*   < > 111*   < >  --    CO2 mmol/L 14* 15* 16* 15* 18* 17*   < > 18*   < >  --    ANION GAP mmol/L " 16* 18* 16* 16* 16* 15*   < > 18*   < >  --    BUN mg/dL 80* 76* 73* 73* 76* 70*   < > 65*   < >  --    CREATININE mg/dL 1.46* 1.44* 1.23 1.25 1.31* 1.36*   < > 1.26   < >  --    EGFR ml/min/1.73sq m 39 40 48 47 44 42   < > 47   < >  --    CALCIUM mg/dL 10.2 10.1 9.7 9.7 10.1 9.9   < > 10.2   < >  --    CALCIUM, IONIZED mmol/L  --   --   --   --  1.39*  --   --   --   --  1.45*   MAGNESIUM mg/dL  --  2.3 1.7*  --  1.9 2.0  --  2.0   < >  --    PHOSPHORUS mg/dL  --  6.3* 6.0*  --  5.9* 5.1*  --  6.3*   < >  --     < > = values in this interval not displayed.     Results from last 7 days   Lab Units 04/16/24  0601 04/15/24  0521 04/14/24  0521 04/13/24  0554 04/12/24  0521   AST U/L 15 15 14 16 22   ALT U/L 27 25 25 31 39   ALK PHOS U/L 480* 292* 307* 743* 845*   TOTAL PROTEIN g/dL 4.0* 3.7* 3.8* 4.0* 4.5*   ALBUMIN g/dL 2.2* 2.1* 2.5* 2.0* 2.5*   TOTAL BILIRUBIN mg/dL 1.56* 1.67* 1.48* 1.30* 1.84*   AMMONIA umol/L  --   --  69  --  91*     Results from last 7 days   Lab Units 04/16/24  1248 04/15/24  1704 04/15/24  1128 04/15/24  0525 04/15/24  0021 04/14/24  1916 04/14/24  1135 04/14/24  0539 04/13/24  2341 04/13/24  1925 04/13/24  1158 04/13/24  0542   POC GLUCOSE mg/dl 235* 165* 158* 165* 184* 165* 131 129 160* 192* 247* 265*     Results from last 7 days   Lab Units 04/16/24  1446 04/16/24  0601 04/15/24  0521 04/14/24  2157 04/14/24  0521 04/13/24  0554 04/12/24  1656 04/12/24  0521 04/11/24  0502 04/10/24  0543   GLUCOSE RANDOM mg/dL 204* 175* 179* 229* 127 254* 243* 139 132 166*             Beta- Hydroxybutyrate   Date Value Ref Range Status   04/12/2024 1.24 (H) 0.02 - 0.27 mmol/L Final   04/09/2024 1.28 (H) 0.02 - 0.27 mmol/L Final   04/03/2024 1.10 (H) 0.02 - 0.27 mmol/L Final          Results from last 7 days   Lab Units 04/10/24  0545   PH NICA  7.275*   PCO2 NICA mm Hg 40.2*   PO2 NICA mm Hg 56.1*   HCO3 NICA mmol/L 18.3*   BASE EXC NICA mmol/L -8.0   O2 CONTENT NICA ml/dL 12.3   O2 HGB, VENOUS % 86.5*          Results from last 7 days   Lab Units 04/11/24  0502   CK TOTAL U/L <10*                         Results from last 7 days   Lab Units 04/10/24  0858   LACTIC ACID mmol/L 0.9                         Results from last 7 days   Lab Units 04/15/24  0555 04/14/24  0555 04/13/24  1519 04/13/24  1514 04/11/24  0555   UNIT PRODUCT CODE  [id number]  [id number]  [id number]  [id number]  [id number]  T1825X95 K4321T63  M4308B41 Q5784J07  N8714X65 R2456G62  T3698J44 K8758C34   UNIT NUMBER  [id number]  [id number]  [id number]-*  [id number]  [id number]  [id number]  [id number]-*  [id number]-*  [id number]   UNITABO  A  O  A  A  A  A A  A A  A A  A A   UNITRH  NEG  NEG  NEG  NEG  NEG  NEG POS  NEG NEG  NEG NEG  NEG NEG   CROSSMATCH  Compatible  Compatible  Compatible  Compatible  Compatible  Compatible Compatible  --  Compatible  Compatible  --    UNIT DISPENSE STATUS  Crossmatched  Presumed Trans  Presumed Trans  Presumed Trans  Crossmatched  Crossmatched Presumed Trans  Presumed Trans Return to Inv  Return to Inv Crossmatched  Crossmatched Presumed Trans   UNIT PRODUCT VOL ml 350  250  350  350  350  350 234  350 350  350 350  350 241         Results from last 7 days   Lab Units 04/16/24  0601 04/14/24  0521 04/12/24  0521 04/10/24  2032 04/10/24  0542   BLOOD CULTURE  Received in Microbiology Lab. Culture in Progress.  Received in Microbiology Lab. Culture in Progress. No Growth at 48 hrs.  No Growth at 48 hrs. Candida albicans*  Candida albicans*  --  Candida albicans*  Candida albicans*   GRAM STAIN RESULT   --   --  Budding Yeast with Pseudomycelia*  Yeast* No polys seen*  1+ Yeast* Budding yeast*  Budding yeast*   BODY FLUID CULTURE, STERILE   --   --   --  3+ Growth of Candida albicans*  --      Results from last 7 days   Lab Units 04/10/24  2016   TOTAL COUNTED  100      Results from last 7 days   Lab Units 04/10/24  2020 04/10/24  2016   APPEARANCE CSF   --  CLEAR   TUBE NUM CSF   --  4   WBC CSF /uL  --  1   XANTHOCHROMIA   --  Yes*   NEUTROPHILS % (CSF) %  --  60   LYMPHS % (CSF) %  --  24   MONOCYTES % (CSF) %  --  16   GLUCOSE CSF mg/dL  --  124*   PROTEIN CSF mg/dL  --  59*   CSF CULTURE  No growth  --            Medications:   Scheduled Medications:  chlorhexidine, 15 mL, Mouth/Throat, Q12H SARTHAK  fluconazole, 800 mg, Intravenous, Q24H  heparin (porcine), 5,000 Units, Subcutaneous, Q8H SARTHAK  insulin lispro, 1-6 Units, Subcutaneous, Q6H SARTHAK  lacosamide, 100 mg, Intravenous, Q12H  lactulose, 20 g, Oral, BID  levalbuterol, 1.25 mg, Nebulization, Q6H  micafungin, 150 mg, Intravenous, Q24H  nystatin, , Topical, BID  pantoprazole, 40 mg, Intravenous, Q12H SARTHAK  sodium bicarbonate, 650 mg, Oral, BID after meals  sodium chloride, 2 spray, Each Nare, BID      Continuous IV Infusions:     PRN Meds:  acetaminophen, 650 mg, Oral, Q6H PRN  albuterol, 2.5 mg, Nebulization, Q4H PRN  ondansetron, 4 mg, Intravenous, Q6H PRN  sodium chloride, 1 Application, Nasal, Q1H PRN        Discharge Plan: D    Network Utilization Review Department  ATTENTION: Please call with any questions or concerns to [phone] and carefully listen to the prompts so that you are directed to the right person. All voicemails are confidential.   For Discharge needs, contact Care Management DC Support Team at [phone] opt. [phone]  Send all requests for admission clinical reviews, approved or denied determinations and any other requests to dedicated fax number below belonging to the campus where the patient is receiving treatment. List of dedicated fax numbers for the Facilities:  FACILITY NAME UR FAX NUMBER   ADMISSION DENIALS (Administrative/Medical Necessity) [phone]   DISCHARGE SUPPORT TEAM (NETWORK) [phone]   PARENT CHILD HEALTH (Maternity/NICU/Pediatrics) [phone]   [facility]  [place] [phone]   [facility] [phone]   [facility] [phone]   [facility] [phone]   [facility] [phone]   [facility] [phone]   [facility] [phone]   [facility] [phone]   [facility] [phone]   [facility] [phone]   [facility] [phone]   [facility] [phone]

## 2024-04-16 NOTE — PROGRESS NOTES
[facility] - Inpatient Progress Note    PATIENT INFORMATION      Carla Hunt 59 y.o. female MRN: [id number]  Unit/Bed#: MICU 10 Encounter: [id number]    ASSESSMENT & PLAN   Carla Hunt is a 59-year-old female with history including but not limited to B-cell lymphoma involving bone marrow s/p chemo previously on Rituxan, seizure disorder s/p partial left upper lobe resection, CKD, DM with current prolonged and complicated hospital course after being admitted for encephalopathy, acute hypoxic respiratory failure in setting of COVID infection now s/p trach placement.  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 Hospitalization has also been complicated by candidemia, pelvic abscesses s/p IR drain placement, DVT, embolic strokes. GI re-consulted over the weekend for GIB s/p EGD x2 with treatment of esophageal and gastric ulcers.     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 4/13 esophageal ulcer actively bleeding s/p 2 clips with epi injected for hemostasis, severe esophagitis and ulcerations  with stomach obscured by clot. Concern for ongoing bleeding and repeat EGD on 4/14 with removal of clot exposing underlying Zaki 1A gastric ulcer s/p clips, bipolar, and epi with hemostasis. Patient now with stable hemoglobin at 8.0.  Patient maintained on IV PPI BID rather than PPI drip per CC team  Can continue tube feeds from GI perspective per CC team  Will only plan to repeat inpatient EGD if concern for ongoing bleeding  Otherwise pending clinical course would recommend repeat in 3 months  Monitor stool output closely  Monitor hgb and transfuse for <7.0  Maintain active type and screen  At this time, no further inpatient GI recommendations. Please contact on-call provider if concern  for rebleed, changes in clinical status, or with additional questions/concerns    SUBJECTIVE     Patient seen and evaluated at bedside. She opens eyes. Does not follow commands. No subjective information able to be obtained    MEDICATIONS & ALLERGIES       Medications:   Medications Prior to Admission   Medication    busPIRone (BUSPAR) 5 mg tablet    escitalopram (LEXAPRO) 10 mg tablet    ibuprofen (MOTRIN) 600 mg tablet    lamoTRIgine (LaMICtal) 200 MG tablet    lamoTRIgine (LaMICtal) 200 MG tablet    ondansetron (ZOFRAN-ODT) 4 mg disintegrating tablet    topiramate (TOPAMAX) 100 mg tablet    topiramate (TOPAMAX) 100 mg tablet    venlafaxine (EFFEXOR-XR) 75 mg 24 hr capsule     Current Facility-Administered Medications   Medication Dose Route Frequency    acetaminophen (TYLENOL) tablet 650 mg  650 mg Oral Q6H PRN    chlorhexidine (PERIDEX) 0.12 % oral rinse 15 mL  15 mL Mouth/Throat Q12H SARTHAK    fentaNYL injection 50 mcg  50 mcg Intravenous Q1H PRN    fluconazole (DIFLUCAN) (HIGH DOSE) IVPB 800 mg  800 mg Intravenous Q24H    heparin (porcine) subcutaneous injection 5,000 Units  5,000 Units Subcutaneous Q8H SARTHAK    insulin lispro (HumALOG/ADMELOG) 100 units/mL subcutaneous injection 1-6 Units  1-6 Units Subcutaneous Q6H SARTHAK    lacosamide (VIMPAT) injection 100 mg  100 mg Intravenous Q12H    lactulose (CHRONULAC) oral solution 20 g  20 g Oral BID    micafungin (MYCAMINE) 150 mg in sodium chloride 0.9 % 100 mL IVPB  150 mg Intravenous Q24H    nystatin (MYCOSTATIN) powder   Topical BID    ondansetron (ZOFRAN) injection 4 mg  4 mg Intravenous Q6H PRN    pantoprazole (PROTONIX) injection 40 mg  40 mg Intravenous Q12H SARTHAK    piperacillin-tazobactam (ZOSYN) 4.5 g in sodium chloride 0.9 % 100 mL IVPB (EXTENDED INFUSION)  4.5 g Intravenous Q8H    polyethylene glycol (MIRALAX) packet 17 g  17 g Per NG Tube Daily    sodium chloride (AYR SALINE NASAL) nasal gel 1 Application  1 Application Nasal Q1H PRN    sodium chloride (OCEAN)  "0.65 % nasal spray 2 spray  2 spray Each Nare BID    sodium chloride 3 % inhalation solution 4 mL  4 mL Nebulization TID       Allergies:   No Known Allergies    PHYSICAL EXAM     Objective   Blood pressure 93/55, pulse 98, temperature (!) 97.2 °F (36.2 °C), resp. rate 22, height 5' 8\" (1.727 m), weight 88 kg (194 lb), SpO2 99%. Body mass index is 29.5 kg/m².    Intake/Output Summary (Last 24 hours) at 4/16/2024 1032  Last data filed at 4/16/2024 0800  Gross per 24 hour   Intake 1435.42 ml   Output 1280 ml   Net 155.42 ml       General Appearance:   Ill appearing   HEENT:   Normocephalic, atraumatic, anicteric, NGT in place     Neck:   Tracheostomy in place   Lungs:   Equal chest rise, mechanically ventilated   Heart:   Tachycardic   Abdomen:   Soft, non-tender, distended; midline wound noted with drain in place   Rectal:   Deferred    Extremities:   No cyanosis, clubbing, diffuse edema   Neuro:   Moves all 4 extremities    Skin:   No jaundice, rashes, or lesions      ADDITIONAL DATA     Lab Results:     Results from last 7 days   Lab Units 04/16/24  0601   WBC Thousand/uL 19.45*   HEMOGLOBIN g/dL 8.0*   HEMATOCRIT % 23.7*   PLATELETS Thousands/uL 69*   LYMPHO PCT % 0*   MONO PCT % 0*   EOS PCT % 1     Results from last 7 days   Lab Units 04/16/24  0601   POTASSIUM mmol/L 3.4*   CHLORIDE mmol/L 117*   CO2 mmol/L 15*   BUN mg/dL 76*   CREATININE mg/dL 1.44*   CALCIUM mg/dL 10.1   ALK PHOS U/L 480*   ALT U/L 27   AST U/L 15           Imaging:    Echo follow up/limited w/ contrast if indicated    Result Date: 4/15/2024  Narrative:   Left Ventricle: Left ventricular cavity size is normal. Wall thickness is normal. The left ventricular ejection fraction is 65%. Systolic function is vigorous. Although no diagnostic regional wall motion abnormality was identified, this possibility cannot be completely excluded on the basis of this study. Unable to assess diastolic function.   Right Ventricle: Right ventricular cavity size " is normal. Systolic function is normal.   Aortic Valve: There is mild regurgitation with a centrally directed jet. There is aortic valve sclerosis.   Tricuspid Valve: There is mild regurgitation.   No obvious valvular vegetations seen.   Study quality was poor. A limited 2D echo was performed.     EGD    Result Date: 4/15/2024  Narrative: Table formatting from the original result was not included. [facility] [address] [facility] [address] [phone] DATE OF SERVICE: 4/13/24 PHYSICIAN(S): Attending: Alireza Lea MD Fellow: Landon Calle MD INDICATION: Hemorrhagic shock (HCC) POST-OP DIAGNOSIS: See the impression below. PREPROCEDURE: Informed consent was obtained for the procedure, including sedation.  Risks of perforation, hemorrhage, adverse drug reaction and aspiration were discussed. The patient was placed in the left lateral decubitus position. Patient was explained about the risks and benefits of the procedure. Risks including but not limited to bleeding, infection, and perforation were explained in detail. Also explained about less than 100% sensitivity with the exam and other alternatives. PROCEDURE: EGD DETAILS OF PROCEDURE: Patient was taken to the procedure room where a time out was performed to confirm correct patient and correct procedure. The patient underwent monitored anesthesia care, which was administered by an anesthesia professional. The patient's blood pressure, heart rate, level of consciousness, respirations, oxygen, ECG and ETCO2 were monitored throughout the procedure. The scope was introduced through the mouth and advanced to the second part of the duodenum. Retroflexion was performed in the fundus. The patient experienced no blood loss. The procedure was not difficult. The patient tolerated the procedure well. There were no apparent adverse events. ANESTHESIA INFORMATION: ASA: ASA status not filed in the log. Anesthesia Type: Anesthesia type not filed in the  log. MEDICATIONS: Totals unavailable because the procedure time range is not set FINDINGS: Benign-appearing stricture (traversable) in the cricopharynx Single small, superficial, irregular ulcer in the middle third of the esophagus (24 cm from the incisors) with oozing hemorrhage (Zaki IB); placed 2 clips successfully (clips are MRI incompatible and MRI conditional); hemostasis achieved; injected 3 mL of epinephrine to address bleeding; hemostasis achieved Severe, generalized grade D esophagitis with multiple mucosal breaks measuring 5 mm or more, continuous between folds, covering 75% or more of the circumference, showing erythematous, friable and ulcerated mucosa in the middle third of the esophagus and lower third of the esophagus Significant amount of blood clotting in the stomach Severe, generalized edematous and ulcerated mucosa with erosion in the stomach, consistent with gastritis The 1st part of the duodenum and 2nd part of the duodenum appeared normal. SPECIMENS: * No specimens in log *     Impression: Single ulcer in mid-esophagus with active oozing (Zaki 1B); hemostasis achieved with placement of 2 clips and epinephrine infiltration. Severe ulcerations and esophagitis. Significant blood clotting in the stomach obscuring visualization despite aggressive irrigation, suction and patient repositioning. Visualized areas showed multiple areas of linear ulcerations with clean base. Posterior wall of the stomach could not be adequately visualized. Normal duodenum. 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 RECOMMENDATION:  Schedule follow-up procedure for continued treatment Avoid NG tube placement for 24-48 hours. Will consider endoscopic NG tube placement at the time. IV PPI BID till in the hospital. IV Reglan Q6H. Consider second look endoscopy depending on the clinical course. NPO till further evaluation. Recommendations discussed with ICU team at bedside.  Alireza Lea MD     XR chest portable  ICU    Result Date: 4/15/2024  Narrative: XR CHEST PORTABLE ICU INDICATION: NGT placement. COMPARISON: 04/08/2024 FINDINGS: Clear lungs. No pneumothorax or pleural effusion. Midline tracheostomy. Enteric tube extends into the stomach. Normal cardiomediastinal silhouette. Bones are unremarkable for age. Normal upper abdomen.     Impression: No acute cardiopulmonary disease. Enteric tube extends into the stomach. Workstation performed: [id number]     EGD    Result Date: 4/14/2024  Narrative: Table formatting from the original result was not included. [facility] [address] [facility] [id number] [phone] DATE OF SERVICE: 4/14/24 PHYSICIAN(S): Attending: No Staff Documented Fellow: No Staff Documented INDICATION: Hematemesis, Hemorrhagic shock (HCC), Gastric ulcer POST-OP DIAGNOSIS: See the impression below. PREPROCEDURE: Informed consent was obtained for the procedure, including sedation.  Risks of perforation, hemorrhage, adverse drug reaction and aspiration were discussed. The patient was placed in the left lateral decubitus position. Patient was explained about the risks and benefits of the procedure. Risks including but not limited to bleeding, infection, and perforation were explained in detail. Also explained about less than 100% sensitivity with the exam and other alternatives. PROCEDURE: EGD DETAILS OF PROCEDURE: Patient was taken to the procedure room where a time out was performed to confirm correct patient and correct procedure. The patient was already under sedation prior to the procedure. The patient's blood pressure, heart rate, level of consciousness, respirations, oxygen, ECG and ETCO2 were monitored throughout the procedure. The scope was introduced through the mouth and advanced to the second part of the duodenum. Retroflexion was performed in the fundus. The patient experienced no blood loss. The procedure was not difficult. The patient tolerated the procedure well.  There were no apparent adverse events. ANESTHESIA INFORMATION: ASA: ASA status cannot be found on the log. Anesthesia Type: Anesthesia type cannot be found on the log. MEDICATIONS: Totals unavailable because the procedure time range is not set FINDINGS: Prior treated esophageal seen again with prior placed clips. No active bleeding. Significant amount of blood clotting in the stomach, which obscured visualization. Removed with suction, snare and retrieval basket. Single small, superficial, irregular ulcer in the stomach with spurting hemorrhage (Zaki IA); placed 4 clips successfully (clips are MRI conditional); hemostasis achieved; induced coagulation and hemostasis achieved with with bipolar cautery; injected 2 mL of epinephrine to address bleeding; hemostasis achieved Attempted to pass OVESCO clip but unable to traverse UES stricture. Good window for PEG tube visualized in case required in future. Other SPECIMENS: * Cannot find log *     Impression: Significant clotting in the stomach, clots were removed for evaluation of underlying mucosa. Multiple gastric ulcers visualized, one of the with adherant clot which was suctioned revealing spurting hemorrhage (Zaki 1A). Treated with placement of 4 clips, 2 cc epinephrine injection and bipolar cautery. Attempted to pass the OVESCO clip but unsuccessful given UES stricture. Previously treated esophageal ulcer without bleeding. Good window for PEG if required in the future. NG tube replaced. RECOMMENDATION:  Schedule repeat EGD Xray to confirm NG placement Avoid Tube feeds for 24 hours atleast. Ok to use NG tube for medications or suction. Will suggest low intermittent suction if required. If suspicion for re-bleeding, consider repeat endoscopy. She remains at risk for re bleeding given overall clinical picture. IV PPI BID Repeat EGD in 3 months pending clinical progression. D/W ICU team and family. No Staff Documented     CT high volume bleeding scan abdomen  pelvis    Result Date: 4/13/2024  Narrative: CT ABDOMEN AND PELVIS - WITHOUT AND WITH IV CONTRAST INDICATION:   GI bleed. COMPARISON: CT chest abdomen pelvis 4/9/2024. TECHNIQUE: CT examination of the abdomen and pelvis was performed both prior to and after the administration of intravenous contrast. Multiplanar 2D reformatted images were created from the source data. Radiation dose length product (DLP) for this visit: 2202 mGy-cm . This examination, like all CT scans performed in the [facility], was performed utilizing techniques to minimize radiation dose exposure, including the use of iterative reconstruction and automated exposure control. IV Contrast: 85 mL of iohexol (OMNIPAQUE) Enteric Contrast: Not administered. FINDINGS: ABDOMEN BOWEL: Nasogastric tube is present. The stomach is distended with mixed density material. Along the posterior aspect of the gastric body there is a small hyperattenuating focus on the arterial phase that increases in size and density on the venous phase upon which there is also development of additional surrounding hyperdensities. This is best demonstrated on series 305 images 89 and 93, and concerning for active hemorrhage. Dependent high attenuation material in the fundus increases during the exam likely reflecting pooling blood. No other convincing sites of active bleeding identified. A focus of hyperattenuation in the proximal duodenum seen only on the arterial phase is nonspecific with no correlate or definite extravasation of blood products on the venous phase. Hyperattenuation within the small bowel at the level of the ostomy has a stable appearance across all 3 phases and likely reflects high attenuation intraluminal contents. No dilated loops of bowel. Diffuse mild small bowel wall thickening is likely due to reactive enteritis. Colonic diverticulosis is present without evidence of acute diverticulitis.. LOWER CHEST: Unchanged pulmonary opacities.  LIVER/BILIARY TREE: Unremarkable. GALLBLADDER: Incompletely distended limiting assessment. No calcified stones. SPLEEN: Unremarkable. PANCREAS: Unremarkable. ADRENAL GLANDS: Unremarkable. KIDNEYS/URETERS: Nephrocalcinosis and multiple circumscribed lesions of various density similar to prior exam. APPENDIX: No findings to suggest appendicitis. ABDOMINOPELVIC CAVITY: Stable to decreased small volume hemoperitoneum with drainage catheter present in the pelvis. No loculated collections. No pneumoperitoneum. No lymphadenopathy. VESSELS: Infrarenal IVC filter in stable position. No aneurysm. PELVIS REPRODUCTIVE ORGANS: Unremarkable for patient's age. URINARY BLADDER: Ortiz catheter in place. Distended with excreted contrast. No wall thickening. ABDOMINAL WALL/INGUINAL REGIONS: Large abdominal wall defect with end ileostomy. Decreased size with redistribution of hematoma in the abdominal wall. BONES: No acute fracture or suspicious osseous lesion.     Impression: Findings concerning for active bleeding in the stomach as described above. I personally discussed this study with GEOVANI STRONG on 4/13/2024 5:53 PM. Workstation performed: [id number]     CTA head w wo contrast    Result Date: 4/12/2024  Narrative: CTA BRAIN WITH CONTRAST INDICATION: r/o mycotic aneursym COMPARISON:   4/9/2024; 4/5/2024; 4/6/2024; 4/10/2024; 1/7/2024 TECHNIQUE:   Post contrast imaging was performed after administration of iodinated contrast through the neck and brain. Post contrast axial 0.625 mm images timed to opacify the arterial system. 3D rendering was performed on an independent workstation.   MIP reconstructions performed. Coronal reconstructions were performed of the noncontrast portion of the brain. Radiation dose length product (DLP) for this visit:  1498.58 mGy-cm .  This examination, like all CT scans performed in the [facility], was performed utilizing techniques to minimize radiation dose exposure, including  the use of iterative reconstruction and automated exposure control. IV Contrast:  85 mL of iohexol (OMNIPAQUE) IMAGE QUALITY:   Diagnostic FINDINGS: NONCONTRAST BRAIN: PARENCHYMA: Small petechial/juxtacortical hemorrhage in the left parietal lobe towards the vertex, possibly hemorrhagic transformation in the region of recent infarction. The hematoma/small amount of hemorrhage is stable. No significant mass effect. Large resection cavity in the left middle cranial fossa redemonstrated indicative of prior left temporal lobectomy. Small mount of periventricular hypodensities are stable. Bilaterally symmetric coarse basal ganglia and dentate nuclei calcifications redemonstrated. Atherosclerotic calcifications noted. VENTRICLES AND EXTRA-AXIAL SPACES: Stable ventricular size without hydrocephalus. Left temporal lobectomy redemonstrated. VISUALIZED ORBITS: Normal visualized orbits. PARANASAL SINUSES: Bilateral mastoid air cell effusions noted. Minimal ethmoidal and maxillary sinus disease. Right-sided NG tube noted. INTRACRANIAL VASCULATURE INTERNAL CAROTID ARTERIES: Atherosclerotic calcifications of the cavernous segment of the internal carotid artery are mild..  Normal ophthalmic artery origins.  Normal ICA terminus. ANTERIOR CIRCULATION:  Symmetric A1 segments and anterior cerebral arteries with normal enhancement.  Normal anterior communicating artery. MIDDLE CEREBRAL ARTERY CIRCULATION:  M1 segment and middle cerebral artery branches demonstrate normal enhancement bilaterally. DISTAL VERTEBRAL ARTERIES:  Normal distal vertebral arteries.  Posterior inferior cerebellar artery origins are normal. Normal vertebral basilar junction. BASILAR ARTERY:  Basilar artery is normal in caliber.  Normal superior cerebellar arteries. POSTERIOR CEREBRAL ARTERIES: Both posterior cerebral arteries arises from the basilar tip.  Both arteries demonstrate normal enhancement.   Normal posterior communicating arteries. DURAL VENOUS  SINUSES:  Normal. NON VASCULAR ANATOMY BONY STRUCTURES: Left pterional craniotomy noted.     Impression: 1. Stable head CT. Small amount of juxtacortical hemorrhage in the left parietal lobe posterior superiorly towards the vertex possibly hemorrhagic transformation of a small recent infarction as designated on recent prior brain MRIs. No new hemorrhage or significant mass effect. 2. No intracranial aneurysm or major intracranial arterial stenosis. No CTA evidence of mycotic aneurysm or explanation for the small parenchymal hemorrhage in the left parietal vertex. 3. Stable left temporal lobectomy. 4. Calcifications in the basal ganglia and dentate nuclei of the cerebellum redemonstrated possibly dystrophic from prior injury or insult versus metabolic abnormalities. Differential diagnosis includes Fahr's disease. 5. Mild periventricular hypodensities redemonstrated possibly microangiopathy versus treatment related changes. These are stable. Workstation performed: [id number]     SATURNINO    Result Date: 4/11/2024  Narrative: SATURNINO cancelled due to inability to pass probe into the esophagus, despite assistance of direct visualization with bronchoscope.  Patient tolerated without difficulty.     Bronchoscopy (Bedside)    Result Date: 4/11/2024  Narrative: Clinton Mays MD     4/11/2024  5:38 PM Bronchoscopy (Bedside) Date/Time: 4/11/2024 11:00 AM Performed by: Clinton Mays MD Authorized by: Clinton Mays MD  Patient location:  Bedside Other Assisting Provider: No  Consent:   Consent obtained:  Verbal   Consent given by:  Spouse   Risks discussed:  Bleeding   Alternatives discussed:  No treatment Universal protocol:   Procedure explained and questions answered to patient or proxy's satisfaction: yes    Patient identity confirmed:  Arm band Indications:   Procedure Purpose: diagnostic    Indications: other (comment)    Indications comment:  To assist with bedside transesophageal echocardiogram Sedation:   Sedation type:   Anxiolysis Upper Airway:   Vocal cords movement comment:  Not visualized - patient has tracheostomy Airway:   Airway:  Entered airway through left nare.  Nasal cavity appears normal without polyps or active bleeding.  Epiglottis visualized and appeared normal.  The arytenoids appears edematous.  Nasogastric tube appears to be entering the esophagus at the right lateral aspect of the arytenoids.  I can see the transesophageal echocardiogram attempting to follow the nasogastric tube but cannot see the esophageal opening.  The SATURNINO probe appears to be abutting against edematous soft tissue right lateral of the arytenoids.  With further distal insertion of the SATURNINO there is mild mucosal irration and mild bleeding.  After that the bronchoscope was advanced between the vocal cords and the superior aspect of the tracheostomy tube along with the cuff was visualized.  Some white mucus superior to that was suctioned.  Post-procedure details:   Patient tolerance of procedure:  Tolerated well, no immediate complications   Complication (if applicable):  None Final Diagnosis/Findings:    Cannot visualize esophageal aperture.  SATURNINO aborted due to risk of esophageal injury    IR drainage tube placement    Result Date: 4/10/2024  Narrative: IMAGE-GUIDED LUMBAR PUNCTURE IMAGE-GUIDED ABCESS DRAIN PLACEMENT History: Prolonged hospital stay, fluid collection in the pelvis Respiratory failure with tracheostomy. Altered mental status. Candidemia Fever and bandemia. Embolic strokes. Lumbar puncture recommended by infectious disease and neurology services. Aspiration/drainage of gluteal collection indicated for identification of infection and source control History of lymphoma, B-cell Contrast: 0 Fluoroscopy time: 0 Anesthesia: General Procedure: After explaining the risks and benefits of the procedures to the patient's spouse, informed consent was obtained. Patient is thrombocytopenic and platelets were given prior to procedure Risks of  bleeding worsening sepsis damage to adjacent structures discussed CT was used to localize the pelvis and spine. Radiation dose length product (DLP) for this visit:  1082.84 mGy  (accession [id number]), 0 mGy  (accession [id number]).  This examination, like all CT scans performed in the [facility], was performed utilizing techniques to minimize radiation dose exposure, including the use of iterative reconstruction and automated exposure control. The patient was identified verbally and by wristband. Timeout was performed. Initially we began with the clean procedure, the lumbar puncture. In the prone position, the lower back was prepped and draped in sterile fashion. 1% lidocaine was used for local anesthetic. A suitable location for puncture was identified with CT at lower lumbar level. A 20 gauge spinal needle was advanced into the CSF  space using CT guidance. Opening pressure recorded at 24 cm of water gravity drainage was used to fill 4 vials of fluid. The fluid was yellow-tinged. 25 cc were removed. The needle was removed and the puncture site was covered with sterile dressing. We now turned attention to pelvic drain placement Using intermittent CT guidance access was gained to the patient's posterior pelvic collection using an 18 gauge Dougherty needle. Pus was encountered A specimen was aspirated, placed in a sterile container and sent to microbiology for culture and sensitivity.  A 0.038 Amplatz wire was advanced into the cavity using fluoroscopic guidance.  After tract dilation, a 10 Frisian all-purpose catheter was advanced into the cavity. The catheter was secured in place with Prolene suture and placed to suction bulb drainage. 10 cc of cheesy pus was removed The patient tolerated the procedure well without apparent immediate complications and was returned to the ICU. Specimens: 25 cc CSF removed 20 cc was sent in 4 vials 5 cc was placed into flow cytometry media Pelvic abscess drainage material  sent for culture aerobic, anaerobic, fungal     Impression: Impression: CT-guided pelvic 10 Uruguayan drain placement yielding pus, transgluteal approach CT-guided lumbar puncture, abnormal yellow tinge to the CSF fluid Opening pressure 24 cm H2O, unclear if this is a reliable indicator of elevated pressures given prone positioning, paralysis, positive pressure ventilation Workstation performed: [id number]     FL lumbar puncture diagnostic    Result Date: 4/10/2024  Narrative: IMAGE-GUIDED LUMBAR PUNCTURE IMAGE-GUIDED ABCESS DRAIN PLACEMENT History: Prolonged hospital stay, fluid collection in the pelvis Respiratory failure with tracheostomy. Altered mental status. Candidemia Fever and bandemia. Embolic strokes. Lumbar puncture recommended by infectious disease and neurology services. Aspiration/drainage of gluteal collection indicated for identification of infection and source control History of lymphoma, B-cell Contrast: 0 Fluoroscopy time: 0 Anesthesia: General Procedure: After explaining the risks and benefits of the procedures to the patient's spouse, informed consent was obtained. Patient is thrombocytopenic and platelets were given prior to procedure Risks of bleeding worsening sepsis damage to adjacent structures discussed CT was used to localize the pelvis and spine. Radiation dose length product (DLP) for this visit:  1082.84 mGy  (accession [id number]), 0 mGy  (accession [id number]).  This examination, like all CT scans performed in the [facility], was performed utilizing techniques to minimize radiation dose exposure, including the use of iterative reconstruction and automated exposure control. The patient was identified verbally and by wristband. Timeout was performed. Initially we began with the clean procedure, the lumbar puncture. In the prone position, the lower back was prepped and draped in sterile fashion. 1% lidocaine was used for local anesthetic. A suitable location for puncture was  identified with CT at lower lumbar level. A 20 gauge spinal needle was advanced into the CSF  space using CT guidance. Opening pressure recorded at 24 cm of water gravity drainage was used to fill 4 vials of fluid. The fluid was yellow-tinged. 25 cc were removed. The needle was removed and the puncture site was covered with sterile dressing. We now turned attention to pelvic drain placement Using intermittent CT guidance access was gained to the patient's posterior pelvic collection using an 18 gauge Dougherty needle. Pus was encountered A specimen was aspirated, placed in a sterile container and sent to microbiology for culture and sensitivity.  A 0.038 Amplatz wire was advanced into the cavity using fluoroscopic guidance.  After tract dilation, a 10 Nepalese all-purpose catheter was advanced into the cavity. The catheter was secured in place with Prolene suture and placed to suction bulb drainage. 10 cc of cheesy pus was removed The patient tolerated the procedure well without apparent immediate complications and was returned to the ICU. Specimens: 25 cc CSF removed 20 cc was sent in 4 vials 5 cc was placed into flow cytometry media Pelvic abscess drainage material sent for culture aerobic, anaerobic, fungal     Impression: Impression: CT-guided pelvic 10 Nepalese drain placement yielding pus, transgluteal approach CT-guided lumbar puncture, abnormal yellow tinge to the CSF fluid Opening pressure 24 cm H2O, unclear if this is a reliable indicator of elevated pressures given prone positioning, paralysis, positive pressure ventilation Workstation performed: [id number]     MRI brain wo contrast    Result Date: 4/10/2024  Narrative: MRI BRAIN WITHOUT CONTRAST INDICATION: History of encephalopathy, multifocal ischemia and possible hemorrhage. COMPARISON:   Multiple prior examinations. Most recent MRI dated 4/6/2024. Recent CT scan dated 4/9/2024. TECHNIQUE:  Multiplanar, multisequence imaging of the brain was performed.  "IMAGE QUALITY:  Diagnostic. FINDINGS: BRAIN PARENCHYMA: As seen on CT scan performed yesterday there is a new small hemorrhage identified within the left inferior parietal lobe on series 5 and 6, image 20. Small adjacent foci of restricted diffusion are seen on series 3, images 23 through 25  which are new compared to the prior examination. Minor edema within the adjacent brain on T2 and FLAIR imaging. Multiple additional small subcentimeter foci of restricted diffusion are seen within the cerebral hemispheres similar to the prior examination from 4 days ago consistent with small scattered subacute infarcts, possibly embolic in etiology. Periventricular white matter changes immediately adjacent to the surface of the lateral ventricles are stable. Patient has known dentate and basal ganglia calcifications. Stable left temporal lobectomy within the middle cranial fossa. VENTRICLES:  Normal for the patient's age. SELLA AND PITUITARY GLAND:  Normal. ORBITS:  Normal. PARANASAL SINUSES:  Normal. VASCULATURE:  Evaluation of the major intracranial vasculature demonstrates appropriate flow voids. CALVARIUM AND SKULL BASE: Extensive bilateral mastoid effusions again identified with fluid signal seen throughout the mastoid air cells, antrum and middle ear cavity bilaterally. EXTRACRANIAL SOFT TISSUES:  Normal.     Impression: As seen on the MRI from 4 days ago, there are small scattered infarcts within the cerebral hemispheres bilaterally suggesting a central embolic source. Small new infarcts are seen within the left inferior parietal lobe with a small acute hemorrhage noted, as seen on yesterday's CT scan. Findings are suggestive of additional small infarcts with focal hemorrhagic transformation. Stable postoperative appearance of the middle cranial fossa on the left status post temporal lobectomy. Complete opacification of the mastoid temporal bones bilaterally is again seen. This examination was marked \"immediate " "notification\" in Epic in order to begin the standard process by which the radiology reading room liaison alerts the referring practitioner. Workstation performed: [id number]     IR IVC filter placement optional/temporary    Result Date: 4/10/2024  Narrative: PROCEDURE: Inferior vena cava filter placement STAFF: Shahriar Shen M.D. CONTRAST: 20 mL Omnipaque iv. FLUOROSCOPY TIME: 1.9 minutes. NUMBER OF IMAGES: Multiple. COMPLICATIONS: None. MEDICATIONS: None. INDICATION: Posterior tibial vein DVT. The patient has a patent foramen ovale with embolic strokes. There is concern for clot propagation. She has hemorrhagic gastritis precluding anticoagulation. PROCEDURE: Via a right femoral venous approach, a diagnostic inferior vena cavagram was obtained to evaluate for caval thrombus, renal vein anatomy/anomalies and location, caval anatomy/anomalies and diameter. Following the diagnostic study, a Hannah inferior vena cava filter was deployed one centimeter below the lowest renal vein. Post placement radiograph demonstrated good position. The sheath was removed and compression applied to the puncture site until hemostasis was achieved. FINDINGS: 1.  Real-time ultrasound showed the right femoral vein to be anechoic and freely compressible. 2.  Diagnostic inferior vena cavagram showed no caval or renal vein anomalies, no caval thrombus, and a caval diameter under 28 mm. 3.  Final fluoroscopic image of the inferior vena cava filter to be in good position.     Impression: Placement of a temporary inferior vena cava filter. PLAN: This filter can be removed at any time in the future. Interventional Radiology will follow this patient to ensure removal when it is no longer needed. Workstation performed: [id number]     US right upper quadrant    Result Date: 4/10/2024  Narrative: RIGHT UPPER QUADRANT ULTRASOUND INDICATION: concern for choledocholithiasis. COMPARISON: CT from 4/9/2024 TECHNIQUE: Real-time ultrasound of the right " upper quadrant was performed with a curvilinear transducer with both volumetric sweeps and still imaging techniques. FINDINGS: PANCREAS: Visualized portions of the pancreas are within normal limits. AORTA AND IVC: Visualized portions are normal for patient age. LIVER: Size: Within normal range. The liver measures 16.9 cm in the midclavicular line. Contour: Surface contour is smooth. Parenchyma: Echogenicity and echotexture are within normal limits. No liver mass identified. Limited imaging of the main portal vein shows it to be patent and hepatopetal. BILIARY: Gallbladder sludge is noted without shadowing stone identified. Mild gallbladder wall thickening. No sonographic Bains sign. No intrahepatic biliary dilatation. CBD measures 3.0 mm. No choledocholithiasis. KIDNEY: Right kidney measures 12.7 x 6.3 x 5.0 cm. Volume 207.6 mL The lower pole of the right kidney is obscured by bowel gas. 8 mm shadowing calculus mid pole. 1.3 cm shadowing calculus lower pole. 2.7 cm simple cyst lower pole.. ASCITES: Small amount of perihepatic ascites..     Impression: The common bile duct is not dilated. Mild perihepatic ascites. Shadowing calculi in the right kidney Gallbladder sludge with wall thickening but no gallstones or sonographic Bains's sign wall thickening can be associated with hepatic pathology. Correlation with any right upper quadrant pain is advised.. Mild perihepatic ascites. The study was marked in EPIC for immediate notification.. Workstation performed: [id number]     CT head wo contrast    Result Date: 4/10/2024  Narrative: CT BRAIN - WITHOUT CONTRAST INDICATION:   r/o hemorrhagic conversion. COMPARISON: MRI from 4/6/2024 and CT from 4/5/2024. TECHNIQUE:  CT examination of the brain was performed.  Multiplanar 2D reformatted images were created from the source data. Radiation dose length product (DLP) for this visit:  860.98 mGy-cm .  This examination, like all CT scans performed in the [facility], was performed utilizing techniques to minimize radiation dose exposure, including the use of iterative  reconstruction and automated exposure control. IMAGE QUALITY: Study is limited by patient tilted in the gantry. FINDINGS: PARENCHYMA: Limited examination. In the interval since the prior examination there has been development of small focus of hemorrhage within the left parietal lobe on series 3, image 28. This measures 0.8 x 0.8 cm. There also appears to be low-attenuation  involving the left frontoparietal and temporal lobes as well as occipital lobe, new since the prior study. No corresponding restricted diffusion in these regions on prior brain MRI. There are senescent calcifications within the basal ganglia bilaterally as well as cerebellar hemispheres. There is no midline shift. There is atherosclerotic intracranial calcification. Patient is status post left temporal lobectomy. VENTRICLES AND EXTRA-AXIAL SPACES: Ventricles and extra-axial CSF spaces are prominent commensurate with the degree of volume loss.  No hydrocephalus.  No acute extra-axial hemorrhage. VISUALIZED ORBITS: Normal visualized orbits. PARANASAL SINUSES: Normal visualized paranasal sinuses. CALVARIUM AND EXTRACRANIAL SOFT TISSUES: Status post left temporal craniotomy. Bilateral mastoid air cell effusions.     Impression: Interval development of a small amount of hemorrhage within the left parietal lobe since 4/5/2024. Low-attenuation involving the left cerebral hemisphere as described above appears new since prior MRI and CT. Findings may be technical secondary to patient tilt of the gantry versus secondary to a developing infarct. Repeat MRI is recommended for further evaluation. Remainder of the findings, as described above. I personally discussed this study with Dr. Medrano on 4/10/2024 8:51 AM. Workstation performed: [id number]     CT chest abdomen pelvis w contrast    Result Date: 4/9/2024  Narrative: CT CHEST, ABDOMEN AND  PELVIS WITH IV CONTRAST INDICATION: r/o bleed. COMPARISON: Comparison is made to prior studies, the most recent CT scan is dated April 5, 2024. TECHNIQUE: CT examination of the chest, abdomen and pelvis was performed. Multiplanar 2D reformatted images were created from the source data. This examination, like all CT scans performed in the [facility], was performed utilizing techniques to minimize radiation dose exposure, including the use of iterative reconstruction and automated exposure control. Radiation dose length product (DLP) for this visit: 750.02 mGy-cm IV Contrast: 100 mL of iohexol (OMNIPAQUE) Enteric Contrast: Not administered. FINDINGS: CHEST LUNGS: No significant change in bilateral groundglass opacities and areas of consolidation particularly in the lower lobes. Bibasilar bronchiectasis stable. Central airways are patent. Tracheostomy tube unchanged. PLEURA: Small right pleural effusion. HEART/GREAT VESSELS: Heart is unremarkable for patient's age. No thoracic aortic aneurysm. MEDIASTINUM AND KIRBY: Enteric tube in position, with the tip in the proximal gastric body. The sidehole is near the GE junction. CHEST WALL AND LOWER NECK: Heterogeneous enlarged right lobe of the thyroid. Status post left thyroidectomy. ABDOMEN LIVER/BILIARY TREE: Unremarkable. GALLBLADDER: No calcified gallstones. No pericholecystic inflammatory change. SPLEEN: Unremarkable. PANCREAS: Unremarkable. ADRENAL GLANDS: Unremarkable. KIDNEYS/URETERS: Bilateral renal cysts and multiple punctate calcifications throughout both kidneys suggestive of medullary nephrocalcinosis. STOMACH AND BOWEL: There is a rectal tube in place unchanged. Patient is status post ileocecectomy. There is a midline ileostomy in place. No evidence of bowel obstruction. APPENDIX: No findings to suggest appendicitis. ABDOMINOPELVIC CAVITY: High density free fluid in the cul-de-sac, likely small volume hemoperitoneum. Small volume ascites  throughout the abdomen and pelvis similar to the prior exam. Small amount of presacral edema. VESSELS: IVC filter appropriately placed. PELVIS REPRODUCTIVE ORGANS: Unremarkable for patient's age. URINARY BLADDER: Collapsed around Ortiz catheter balloon. ABDOMINAL WALL/INGUINAL REGIONS: Wound VAC in the midline abdomen adjacent to the ileostomy with surrounding packing material. Moderate body wall edema. 6 x 1.8 x 5 cm hematoma in the anterior abdominal wall to the right of midline near the wound VAC. BONES: No acute fracture or suspicious osseous lesion.     Impression: Small volume hemoperitoneum in the cul-de-sac, and small anterior abdominal wall hematoma. No large hematoma in the chest abdomen or pelvis. Otherwise stable findings. Workstation performed: [id number]     XR chest portable ICU    Result Date: 4/9/2024  Narrative: XR CHEST PORTABLE ICU INDICATION: tachypnea. COMPARISON: April 4, 2024 FINDINGS: Examination performed in the somewhat rotated projection. Tracheostomy catheter in place. Right internal jugular central venous catheter tip over superior vena cava. Consolidative density throughout the left lung most dense in the left lung base. Small patchy consolidative density in the lateral right mid chest. Previously noted right chest consolidation appears improved from previous exam. No pneumothorax or pleural  effusion. Normal cardiomediastinal silhouette. Bones are unremarkable for age. Normal upper abdomen.     Impression: Multifocal consolidative opacities most dense over the left lung and left lower lung with small patchy density in the lateral right mid chest. Pulmonary parenchymal density appears decreased in the right mid chest. The appearance is suspicious for multifocal pneumonia. Continued radiographic follow-up recommended. Workstation performed: [id number]      VAS VENOUS DUPLEX - LOWER LIMB BILATERAL    Result Date: 4/7/2024  Narrative:  [facility] REPORT CLINICAL: Indications:  Physician wants to determine patency of the venous system secondary to cardioembolic CVA with recent discovery of a patent foramen ovale. Operative History: 2024-04-07 SATURNINO Risk Factors The patient has history of CKD.  FINDINGS:  Right       Impression  PostTibial  Subacute       CONCLUSION: Impression: RIGHT LOWER LIMB: Evidence of subacute, occlusive deep vein thrombosis identified in the paired posterior tibial veins. No evidence of superficial thrombophlebitis noted. Doppler evaluation shows a normal response to augmentation maneuvers.. Popliteal, posterior tibial and anterior tibial arterial Doppler waveform's are triphasic.  LEFT LOWER LIMB: No evidence of acute or chronic deep vein thrombosis. No evidence of superficial thrombophlebitis noted. Doppler evaluation shows a normal response to augmentation maneuvers. Popliteal, posterior tibial and anterior tibial arterial Doppler waveform's are triphasic.  Technical findings shared with Dr. Bowden, Dr. Lazcano and posted in Epic.  SIGNATURE: Electronically Signed by: ADA CRENSHAW on 2024-04-07 07:52:48 PM    Echo complete w/ contrast if indicated    Result Date: 4/7/2024  Narrative:   Left Ventricle: Left ventricular cavity size is normal. Wall thickness is mildly increased. There is mild concentric hypertrophy. The left ventricular ejection fraction is 70%. Systolic function is hyperdynamic.   Atrial Septum: There is  right to left shunting noted using saline contrast at rest and with provocation (abdominal compression) which may be related to a PFO or intrapulmonary shunt.   Aorta: The aortic root is mildly dilated. The aortic root is 4.20 cm.   Technically very difficult study.   No definite evidence of valvular vegetations on the study.     MRI brain wo contrast    Result Date: 4/6/2024  Narrative: MRI BRAIN WITHOUT CONTRAST INDICATION: persistent encephalopathy, uremia. COMPARISON:   Brain MRI 1/10/2024. TECHNIQUE:  Multiplanar, multisequence imaging of the  brain was performed. IMAGE QUALITY:  Diagnostic. FINDINGS: BRAIN PARENCHYMA: Multiple small foci of acute infarcts involving bilateral frontal lobes, right parietal and occipital lobes, bilateral basal ganglia, and bilateral corona radiata. There is questionable tiny acute infarct in the left occipital lobe (series 3 image 14). There is questionable tiny acute infarct in the left cerebellum (series 3 image 11). There is no significant edema or mass effect. No midline shift. There is partial lack of sulcal CSF suppression in the FLAIR sequence seen in supratentorial and infratentorial brain. Redemonstrated postsurgical change from prior partial left temporal lobe resection with mild caliectasis along the resection margin. There is no discrete mass. No acute intracranial hemorrhage. Stable focus of old microhemorrhage in the right periatrial white matter (series 7 image 17). VENTRICLES:  Normal for the patient's age. SELLA AND PITUITARY GLAND:  Normal. ORBITS:  Normal. PARANASAL SINUSES: Left greater than right mild maxillary sinus mucosal thickening. VASCULATURE:  Evaluation of the major intracranial vasculature demonstrates appropriate flow voids. CALVARIUM AND SKULL BASE:  Normal. Complete opacification of bilateral mastoid air cells and middle ears. EXTRACRANIAL SOFT TISSUES:  Normal.     Impression: 1.  Multiple small bilateral foci of acute infarcts as described suggesting embolic etiology. No significant edema, mass effect, or hemorrhagic transformation. 2.  Partial lack of sulcal CSF suppression in the FLAIR sequence noted in the supratentorial and infratentorial brain. This could be artifactual/technique related; however, inflammatory or infectious process is not excluded. Correlate with clinical assessment. 3.  Stable postoperative appearance from prior partial left temporal lobe resection. 4.  Complete opacification of bilateral mastoids air cells and middle ears. The study was marked in EPIC for immediate  notification. Workstation performed: [id number]      thyroid    Result Date: 4/5/2024  Narrative: THYROID ULTRASOUND INDICATION: thyroid enlargement, collapsing of airway -- recommended per radiologist. COMPARISON: CT chest abdomen pelvis 4/5/2024 TECHNIQUE: Ultrasound of the thyroid was performed with a high frequency linear transducer in transverse and sagittal planes including volumetric imaging sweeps as well as traditional still imaging technique. FINDINGS: Limited exam due to overlying tracheostomy tube and central line catheter on the right. Thyroid texture: Heterogeneous thyroid echotexture. Lobular contour. Vascularity within normal limits. Right lobe: 4.6 x 2.2 x 1.8 cm. Volume 8.8 mL Left lobe: Prior left lobectomy. Isthmus: 1.1 cm. No discrete thyroid nodules are demonstrated.     Impression: 1. Limited exam due to overlying tracheostomy tube and central line catheter. Heterogeneous lobular appearance to the remaining thyroid gland, nonspecific, question sequela of thyroiditis. No discrete nodule identified. Reference: ACR Thyroid Imaging, Reporting and Data System (TI-RADS): White Paper of the ACR TI-RADS Committee. J AM Maria G Radiol 2017;14:587-595. Additional recommendations based on American Thyroid Association 2015 guidelines. Workstation performed: [id number]     CT chest abdomen pelvis w contrast    Result Date: 4/5/2024  Narrative: CT CHEST, ABDOMEN AND PELVIS WITH IV CONTRAST INDICATION: Encephalopathy, bandemia, hematuria, s/p abdominal surgery, pneumonitis. COMPARISON: CT abdomen/pelvis 4/1/2024. CT chest 1/31/2024. AP chest from yesterday. TECHNIQUE: CT examination of the chest, abdomen and pelvis was performed. Multiplanar 2D reformatted images were created from the source data. This examination, like all CT scans performed in the [facility], was performed utilizing techniques to minimize radiation dose exposure, including the use of iterative reconstruction and automated  exposure control. Radiation dose length product (DLP) for this visit: 1318.8 mGy-cm IV Contrast: 100 mL of iohexol (OMNIPAQUE) Enteric Contrast: Not administered. FINDINGS: CHEST LUNGS: Persistent bilateral groundglass and consolidative opacities throughout both lungs most prominent in the lower lobes improved from 1/31/2024. Unchanged bilateral lower lobe bronchiectasis. No endotracheal or endobronchial lesion. Tracheostomy tube tip located above the tracee. PLEURA: New small right pleural effusion. HEART/GREAT VESSELS: Heart is unremarkable for patient's age. No thoracic aortic aneurysm. MEDIASTINUM AND KIRBY: Unremarkable. CHEST WALL AND LOWER NECK: Status post left thyroidectomy. Heterogeneous multinodular right thyroid. ABDOMEN LIVER/BILIARY TREE: Unremarkable. GALLBLADDER: Vicarious excretion of contrast material. Pericholecystic fluid likely on the basis of perihepatic ascites. No wall thickening. SPLEEN: Unremarkable. PANCREAS: Unremarkable. ADRENAL GLANDS: Subcentimeter left adrenal nodularity does not need further follow-up. KIDNEYS/URETERS: No hydronephrosis. Innumerable bilateral cyst unchanged from prior studies. Innumerable punctate medullary calcifications/medullary nephrocalcinosis. 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 STOMACH AND BOWEL: Enteric tube tip is located in the gastric lumen. Rectal tube is present. Status post ileocecectomy. Midline ileostomy as described above with surrounding wound with subcutaneous debris/packing material. No bowel obstruction. APPENDIX: No findings to suggest appendicitis. ABDOMINOPELVIC CAVITY: Trace ascites. No pneumoperitoneum. No lymphadenopathy. VESSELS: Unremarkable for patient's age. PELVIS REPRODUCTIVE ORGANS: Unremarkable for patient's age. URINARY BLADDER: Decompressed with a Rotiz catheter. ABDOMINAL WALL/INGUINAL REGIONS: Midline ileostomy as described above with surrounding wound with subcutaneous debris/packing material. BONES: No  acute fracture or suspicious osseous lesion.     Impression: Improved but persistent groundglass and consolidative bilateral pulmonary opacities. No new intra-abdominal or intrapelvic findings. Workstation performed: [id number]     CT head wo contrast    Result Date: 4/5/2024  Narrative: CT BRAIN - WITHOUT CONTRAST INDICATION:   Encephalopathy, bandemia, hematuria, s/p abdominal surgery, pneumonitis. COMPARISON: CT head without contrast 3/31/2024, 3/13/2024, 3/9/2024, 1/7/2024. MRI brain seizure with and without contrast 1/10/2024. MRI brain without contrast 1/8/2024. CTA head and neck with and without contrast 1/7/2024 TECHNIQUE:  CT examination of the brain was performed.  Multiplanar 2D reformatted images were created from the source data. Radiation dose length product (DLP) for this visit:  931.37 mGy-cm .  This examination, like all CT scans performed in the [facility], was performed utilizing techniques to minimize radiation dose exposure, including the use of iterative  reconstruction and automated exposure control. IMAGE QUALITY:  Diagnostic. FINDINGS: PARENCHYMA AND EXTRA-AXIAL SPACES: Decreased attenuation is noted in periventricular and subcortical white matter demonstrating an appearance that is statistically most likely to represent mild microangiopathic change, similar to prior exam. Unchanged postsurgical changes of left partial temporal lobectomy with large cystic resection cavity. No CT signs of acute infarction given limitations of beam-hardening streak artifact and posterior fossa.  No intracranial mass, mass effect or midline shift.  No acute parenchymal hemorrhage. Unchanged symmetric dense calcifications in bilateral medial basal ganglia and bilateral cerebellum. Unchanged faint calcifications in bilateral frontal cortex. VENTRICLES :  Normal for the patient's age. VISUALIZED ORBITS: Normal visualized orbits. PARANASAL SINUSES: Small amount of frothy secretions in right  sphenoid sinus. Small left maxillary mucous retention cyst. Partially imaged right nasoenteric tube. CALVARIUM AND EXTRACRANIAL SOFT TISSUES: Left temporal craniotomy with bone flap cranioplasty and microplate screw fixation. Large bilateral mastoid and bilateral middle ear effusions, worsened on right side.     Impression: No acute intracranial abnormality status post left partial temporal lobectomy given limitations of beam-hardening streak artifact in posterior fossa. Similar mild chronic microangiopathy. Large bilateral mastoid and bilateral middle ear effusions, worsened on right side. Workstation performed: [id number]     XR chest portable ICU    Result Date: 4/4/2024  Narrative: XR CHEST PORTABLE ICU INDICATION: HD line confirmation. COMPARISON: Radiograph from 4/4/2024 FINDINGS: Tracheostomy tube. Enteric tube courses inferiorly along the expected course of the esophagus, tip below the level of the hemidiaphragms. Right IJ catheter tip overlying the lower SVC. Stable moderate diffuse hazy bilateral airspace opacities. Bibasilar atelectasis. No pneumothorax or pleural effusion. Normal cardiomediastinal silhouette. Bones are unremarkable for age. Normal upper abdomen.     Impression: 1. Interval placement of right IJ catheter tip overlying the lower SVC. Other tubes/lines stable. 2. Similar appearance of moderate diffuse bilateral infectious/inflammatory airspace opacities. Workstation performed: [id number]     VAS upper limb venous duplex scan, unilateral/limited    Result Date: 4/4/2024  Narrative:  [facility] REPORT CLINICAL: Indications: Patient presents with left upper extremity edema. Patient is currently on a ventilator. Operative History: No prior cardiovascular surgeries Risk Factors The patient has history of CKD.  FINDINGS:  Left                       Thrombus           Impression      Innominate Vein                               Not Visualized  Ceph Upper                 Acute - Occlusive                   Ceph Mid Arm               Acute - Occlusive                  Cephalic Upper Arm Distal  Acute - Occlusive                  Ceph AC                    Acute - Occlusive                     CONCLUSION:  Impression  RIGHT UPPER LIMB LIMITED: Unable to evaluate right neck vessels due to positioning and medical straps.  LEFT UPPER LIMB: No evidence of acute or chronic deep vein thrombosis. Innominate vein not visualized due to straps. There is evidence of acute occlusive superficial thrombophlebitis in the cephalic vein from the elbow to proximal upper arm. Doppler evaluation shows a normal response to augmentation maneuvers.  Technical findings given via tiger text to Dr. Bowden.  SIGNATURE: Electronically Signed by: HANNAH BRENNER MD, RPVI on 2024-04-04 11:26:01 AM    EGD    Result Date: 4/4/2024  Narrative: Table formatting from the original result was not included. [facility] Endoscopy [address] [facility] [address] [phone] DATE OF SERVICE: 4/03/24 PHYSICIAN(S): Attending: No Staff Documented Fellow: No Staff Documented INDICATION: Hematemesis POST-OP DIAGNOSIS: See the impression below. PREPROCEDURE: Informed consent was obtained for the procedure, including sedation.  Risks of perforation, hemorrhage, adverse drug reaction and aspiration were discussed. The patient was placed in the left lateral decubitus position. Patient was explained about the risks and benefits of the procedure. Risks including but not limited to bleeding, infection, and perforation were explained in detail. Also explained about less than 100% sensitivity with the exam and other alternatives. PROCEDURE: EGD DETAILS OF PROCEDURE: Patient was taken to the procedure room where a time out was performed to confirm correct patient and correct procedure. The patient underwent monitored anesthesia care, which was administered by an anesthesia professional. The patient's blood pressure, heart rate, level of  consciousness, respirations, oxygen, ECG and ETCO2 were monitored throughout the procedure. The scope was introduced through the mouth and advanced to the second part of the duodenum. Retroflexion was performed in the fundus. Prior to the procedure, the patient's H. Pylori status was unknown. The patient experienced no blood loss. The procedure was not difficult. The patient tolerated the procedure well. There were no apparent adverse events. ANESTHESIA INFORMATION: ASA: ASA status cannot be found on the log. Anesthesia Type: Anesthesia type cannot be found on the log. MEDICATIONS: Totals unavailable because the procedure time range is not set FINDINGS: Localized ulcerated mucosa in the upper third of the esophagus (15 cm from the incisors) Irregular Z-line 44 cm from the incisors Moderate esophagitis Blood clotting in the body of the stomach and antrum The duodenal bulb and 2nd part of the duodenum appeared normal. A nasogastric tube was placed in the body of the stomach SPECIMENS: * Cannot find log *     Impression: Ulcerated mucosa in the upper third of the esophagus without evidence of hemorrhage Mild esophagitis Blood clotting in the body of the stomach and antrum again limiting visualization, but without worsening from previous exam The duodenal bulb and 2nd part of the duodenum appeared normal. RECOMMENDATION:  NG tube placed, ok to use for meds, tube feeds Continue PPI iv bid Monitor NG tube output, stools Trend Hb Follow up previous path    No Staff Documented     XR chest portable ICU    Result Date: 4/4/2024  Narrative: XR CHEST PORTABLE ICU INDICATION: Worsening clinical status, vent dependent. COMPARISON: 4/1/2024 FINDINGS: Tracheotomy tube positioning unchanged. Nasogastric tube tip and gastric fundus and sideport in region of esophagogastric junction. There is persistent airspace disease within the right midlung field, now with hazy left lung  opacities as well. No pneumothorax or pleural effusion.  Normal cardiomediastinal silhouette. Bones are unremarkable for age. Normal upper abdomen.     Impression: 1. Stable appearance of right lung infiltrate, now with developing hazy airspace opacities in the left lung 2. Nasogastric tube tip in the gastric fundus and sideport at the esophagogastric junction. May consider advancement to assure sideport is below the EG junction. The examination demonstrates a significant  finding and was documented as such in [facility] for liaison and referring practitioner notification. Workstation performed: [id number]     EGD    Result Date: 4/2/2024  Narrative: Table formatting from the original result was not included. [facility] Endoscopy [address] [facility] [address] [phone] DATE OF SERVICE: 4/02/24 PHYSICIAN(S): Attending: Alireza Lea MD Fellow: Shahriar Bernard MD INDICATION: Hemorrhagic shock (HCC) POST-OP DIAGNOSIS: See the impression below. PREPROCEDURE: Informed consent was obtained for the procedure, including sedation.  Risks of perforation, hemorrhage, adverse drug reaction and aspiration were discussed. The patient was placed in the left lateral decubitus position. Patient was explained about the risks and benefits of the procedure. Risks including but not limited to bleeding, infection, and perforation were explained in detail. Also explained about less than 100% sensitivity with the exam and other alternatives. PROCEDURE: EGD DETAILS OF PROCEDURE: Patient was taken to the procedure room where a time out was performed to confirm correct patient and correct procedure. The patient underwent monitored anesthesia care, which was administered by an intensivist. The patient's blood pressure, heart rate, level of consciousness, respirations, oxygen, ECG and ETCO2 were monitored throughout the procedure. The scope was introduced through the mouth and advanced to the second part of the duodenum. Retroflexion was performed in the fundus. Prior to the  procedure, the patient's H. Pylori status was unknown. The patient experienced no blood loss. The procedure was not difficult. The patient tolerated the procedure well. There were no apparent adverse events. ANESTHESIA INFORMATION: ASA: ASA status not filed in the log. Anesthesia Type: Anesthesia type not filed in the log. MEDICATIONS: levalbuterol (XOPENEX) inhalation solution 1.25 mg 1.25 mg sodium chloride 3 % inhalation solution 4 mL 4 mL (Totals for administrations occurring from 1315 to 1407 on 04/02/24) FINDINGS: Ulcerated mucosa in the upper third of the esophagus (15 cm from the incisors); there was stigmata of recent hemorrhage, and bleeding occurred before intervention. 2 focal tears were visualized in the proximal and mid esophagus measures 1 cm each with large clot which was removed. The proximal location has mild oozing but in setting of thrombocytopenia and location this was not treated Significant amount of blood clotting in the esophagus and stomach, which obscured visualization. All old blood and clots. This was removed from the esophagus Z-line 43 cm from the incisors The duodenal bulb and 2nd part of the duodenum appeared normal. Multiple small, superficial, round, benign-appearing ulcers in the cardia, fundus of the stomach and body of the stomach with clean base (Zaki III); performed cold forceps biopsy SPECIMENS: ID Type Source Tests Collected by Time Destination 1 : Rule out CMV, HSV and candida Tissue Stomach CMV CULTURE, TISSUE EXAM Alireza Lea MD 4/2/2024  2:00 PM      Impression: Ulcerated esophageal tear 15 cm from incisors with slow oozing initially after clot clearance with cessation of hemorrhage at end of exam Blood and extensive clot present in the esophagus and stomach The duodenal bulb and 2nd part of the duodenum appeared normal. Multiple, small ulcers and trauma throughout stomach with clean base (Zaki III); performed cold forceps biopsy RECOMMENDATION:  Await pathology  results  Await pathology results  Continue PPI iv bid Give Reglan 10 mg iv q8h Hold on further enteral access for 24 hrs Will plan for repeat EGD tomorrow to eval the areas of tear Special specimen send for HSV, CMV and candidiasis NG tube trauma (difficult placement) possible cause for esophageal superficial tear Erosive gastritis likely due to high steroids, NG suction trauma vs. Possible infectious etiology in setting of immunosuppressive therapy    Alireza Lea MD     CT chest w contrast    Addendum Date: 4/1/2024 Addendum:   ADDENDUM: Please note the findings described in the body of an enlarged heterogeneous right thyroid lobe is new from recent CT chest performed 3 weeks ago. Given the new tracheostomy this most likely is procedure related possibly hemorrhage. Ultrasound characterization may be obtained in a few weeks. I personally discussed this study with Moises Bowden on 4/1/2024 5:54 PM. .    Result Date: 4/1/2024  Narrative: CT CHEST WITH IV CONTRAST INDICATION: bilateral opacities, tachypnea. COMPARISON: Compared with 3/10/2024 TECHNIQUE: CT examination of the chest was performed. Multiplanar 2D reformatted images were created from the source data. This examination, like all CT scans performed in the [facility], was performed utilizing techniques to minimize radiation dose exposure, including the use of iterative reconstruction and automated exposure control. Radiation dose length product (DLP) for this visit: 436.63 mGy-cm IV Contrast: 85 mL of iohexol (OMNIPAQUE) FINDINGS: LUNGS: Bilateral parenchymal groundglass haziness with focal areas of consolidation like change new in the right upper lobe peripherally and in the lower lobes bilaterally posteriorly. Breathing motion artifact limits evaluation of bronchiectatic changes  in the lower lobes bilaterally.. There is no tracheal or endobronchial lesion. PLEURA: Unremarkable. HEART/GREAT VESSELS: Heart is unremarkable for patient's age. No  thoracic aortic aneurysm. MEDIASTINUM AND KIRBY: Unremarkable. CHEST WALL AND LOWER NECK: Enlarged heterogeneous right thyroid lobe. Left lobe not seen. Tracheostomy tube in place. VISUALIZED STRUCTURES IN THE UPPER ABDOMEN: Nasogastric tube in the stomach with fluid. OSSEOUS STRUCTURES: No acute fracture or destructive osseous lesion.     Impression: Bilateral parenchymal infiltrative changes with consolidation like focus in the right upper lobe is new. Relative worsening. Workstation performed: [id number]     Echo follow up/limited w/ contrast if indicated    Result Date: 4/1/2024  Narrative:   Left Ventricle: Left ventricular cavity size is normal. Wall thickness is mildly increased. There is concentric remodeling. The left ventricular ejection fraction is 70%. Systolic function is hyperdynamic. Wall motion is normal.   Right Ventricle: Right ventricular cavity size is normal. Systolic function is hyperdynamic.   Mitral Valve: There is trace regurgitation.   Tricuspid Valve: There is trace regurgitation.   Prior TTE study available for comparison. Prior study date: 3/17/2024. No significant changes noted compared to the prior study.     CT abdomen pelvis w contrast    Result Date: 4/1/2024  Narrative: CT ABDOMEN AND PELVIS WITH IV CONTRAST INDICATION: blood clots in urine, currently doing cbi. COMPARISON: CT chest/abdomen/pelvis dated 3/10/2024. TECHNIQUE: CT examination of the abdomen and pelvis was performed. Multiplanar 2D reformatted images were created from the source data. This examination, like all CT scans performed in the [facility], was performed utilizing techniques to minimize radiation dose exposure, including the use of iterative reconstruction and automated exposure control. Radiation dose length product (DLP) for this visit: 705.77 mGy-cm IV Contrast: 100 mL of iohexol (OMNIPAQUE) Enteric Contrast: Not administered. FINDINGS: ABDOMEN LOWER CHEST: Persistent bilateral lower lobe  airspace consolidation. LIVER/BILIARY TREE: Unremarkable. GALLBLADDER: No calcified gallstones. No pericholecystic inflammatory change. SPLEEN: Unremarkable. PANCREAS: Unremarkable. ADRENAL GLANDS: Unremarkable. KIDNEYS/URETERS: Numerous bilateral renal cysts measuring up to 2.3 cm on the left and other subcentimeter hypodensities which are too small to characterize. Numerous bilateral renal calculi/calcifications redemonstrated probably due to medullary sponge kidney. No hydronephrosis. STOMACH AND BOWEL: No evidence for bowel obstruction. The patient is again noted to be status post ileocolectomy with right lower quadrant ileostomy. Associated edema and small foci of subcutaneous emphysema consistent with postsurgical change are not significantly changed. No focal drainable fluid collection is identified. APPENDIX: No findings to suggest appendicitis. ABDOMINOPELVIC CAVITY: Trace ascites. No pneumoperitoneum. No lymphadenopathy. VESSELS: Unremarkable for patient's age. PELVIS REPRODUCTIVE ORGANS: Unremarkable for patient's age. URINARY BLADDER: Ortiz catheter within the urinary bladder. ABDOMINAL WALL/INGUINAL REGIONS: Unremarkable. BONES: No acute fracture or suspicious osseous lesion.     Impression: Stable postsurgical changes status post ileocolectomy with right lower quadrant ileostomy with persistent associated inflammatory changes and subcutaneous emphysema. No focal drainable fluid collection identified. Trace ascites. Persistent bilateral lower lobe atelectasis. Workstation performed: [id number]     XR chest portable    Result Date: 4/1/2024  Narrative: XR CHEST PORTABLE INDICATION: AMS, possible aspiration. COMPARISON: Chest radiograph and CT of the chest 3/31/2024. FINDINGS: Tracheostomy in unchanged position. NG tube tip below the level of the left hemidiaphragm, extending inferior outside of the field-of-view. When compared to chest radiograph 3/31/2024, there is improvement in the previously  described groundglass opacities in the left lung. The previously mentioned focal consolidation in the right midlung zone has not significantly changed. No pneumothorax or  pleural effusion. Normal cardiomediastinal silhouette. Bones are unremarkable for age. Normal upper abdomen.     Impression: Focal consolidation in the right middle lung zone is approximately the same when compared to 3/31/2024, concerning for pneumonia. Improvement in background groundglass consolidations in the left lung when compared to 3/31/2024. Resident: LOUISE LEUNG I, the attending radiologist, have reviewed the images and agree with the final report above. Workstation performed: [id number]     XR chest portable ICU    Result Date: 3/31/2024  Narrative: XR CHEST PORTABLE ICU INDICATION: tachypnea. COMPARISON: CXR 3/29/2024 and 3/22/2024, chest CT 3/31/2024. FINDINGS: Tracheostomy projecting over the trachea. NG tube below the diaphragm. Persistent groundglass opacity with new consolidation in the right midlung. No pneumothorax or pleural effusion. Normal cardiomediastinal silhouette. Bones are unremarkable for age. Normal upper abdomen.     Impression: Persistent groundglass opacity with new consolidation in the right midlung. See chest CT for further evaluation. Workstation performed: [id number]     CT head wo contrast    Result Date: 3/31/2024  Narrative: CT BRAIN - WITHOUT CONTRAST INDICATION:   AMS. COMPARISON: 3/13/2024 TECHNIQUE:  CT examination of the brain was performed.  Multiplanar 2D reformatted images were created from the source data. Radiation dose length product (DLP) for this visit:  856.42 mGy-cm .  This examination, like all CT scans performed in the [facility], was performed utilizing techniques to minimize radiation dose exposure, including the use of iterative  reconstruction and automated exposure control. IMAGE QUALITY:  Diagnostic. FINDINGS: PARENCHYMA: Cystic encephalomalacia and gliosis in  the left middle cranial fossa in the left temporal pole subjacent to a pterional craniotomy redemonstrated indicative of left temporal lobectomy. Stable basal ganglia calcifications are bilaterally symmetric. Mild periventricular hypodensities noted. There are also bilateral cerebellar calcifications in the region of the dentate nuclei. VENTRICLES AND EXTRA-AXIAL SPACES: Stable. No hydrocephalus. VISUALIZED ORBITS: Conjugate gaze to the right. PARANASAL SINUSES: Mucosal thickening CALVARIUM AND EXTRACRANIAL SOFT TISSUES: Left pterional craniotomy again noted     Impression: 1. No acute intracranial hemorrhage, mass effect or edema. 2. 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 3. 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 Differential diagnosis could include treatment related changes versus other etiologies.. Workstation performed: [id number]     XR chest portable ICU    Result Date: 3/30/2024  Narrative: XR CHEST PORTABLE ICU INDICATION: tachypnea. COMPARISON: CXR 3/22/2024, chest CT 3/10/2024. FINDINGS: Tracheostomy. NG tube below the diaphragm. Worsening bilateral groundglass opacity. No pneumothorax or pleural effusion. Normal cardiomediastinal silhouette. Bones are unremarkable for age. Persistent mild elevation of the right diaphragm.     Impression: Worsening bilateral groundglass opacity. Workstation performed: [id number]     FL barium swallow video w speech    Result Date: 3/27/2024  Narrative: A video barium swallow study was performed by the Department of Speech Pathology. Please refer to the report for the official interpretation. The images are stored for archival purposes only. Study images were not formally reviewed by the Radiology Department.    VAS upper limb venous duplex scan, unilateral/limited    Result Date: 3/24/2024  Narrative:  [facility] REPORT CLINICAL: Indications: Patient presents  with left upper extremity edema x few days. Risk Factors The patient has history of CKD.  FINDINGS:  Left                       Thrombus           Cephalic Upper Arm Distal  Acute - Occlusive  Ceph AC                    Acute - Occlusive     CONCLUSION:  Impression RIGHT UPPER LIMB LIMITED: Unable to evaluate the neck veins due to medical devises.  LEFT UPPER LIMB: No evidence of acute or chronic deep vein thrombosis. Unable to evaluate IJV and innominate vein due medical devises. There is evidence of acute occlusive superficial thrombophlebitis noted in the cephalic vein at the elbow and distal upper arm. Doppler evaluation shows a normal response to augmentation maneuvers.  Technical findings were given to Dr. Sindi Recinos.  SIGNATURE: Electronically Signed by: ABIMAEL GONZALES DO on 2024-03-24 09:56:59 PM    XR chest portable    Result Date: 3/22/2024  Narrative: XR CHEST PORTABLE INDICATION: hypoxia. COMPARISON: Compared with 3/20/2024 FINDINGS: Tracheostomy tube. Feeding tube in the stomach. Mild prominent interstitial markings. No pneumothorax or pleural effusion. Normal cardiomediastinal silhouette. Bones are unremarkable for age. Normal upper abdomen.     Impression: Mild congestive changes. Workstation performed: [id number]     XR chest portable ICU    Result Date: 3/20/2024  Narrative: XR CHEST PORTABLE ICU INDICATION: f/u pneumonia. COMPARISON: Radiograph March 14, 2024 FINDINGS: Tracheostomy tube in place. Enteric tube coursing into the stomach. Multifocal patchy airspace opacities in a bronchiolar distribution, particularly in the lower lobes, overall slightly improved. No pneumothorax or pleural effusion. Normal cardiomediastinal silhouette. Bones are unremarkable for age. Normal upper abdomen.     Impression: Overall mild improvement in multifocal bronchopneumonia, likely aspiration related. Workstation performed: [id number]     Bronchoscopy    Result Date: 3/17/2024  Narrative: Table formatting from the  original result was not included. [facility] [address] [facility] [address] [phone] DATE OF SERVICE: 3/17/24 PHYSICIAN(S): Attending: Jarett Martins MD Fellow: Miguel Interiano MD INDICATION: Pneumonia of both lungs due to infectious organism, unspecified part of lung POST-OP DIAGNOSIS: See the impression below. PREPROCEDURE: Standard airway preparation completed per respiratory therapy protocol.  Informed consent was obtained. Images reviewed prior to the procedure.  A Time Out was performed. No suspicion or identified risk for TB or other airborne infectious disease; bronchoscopy procedure being performed for diagnostic purposes. PROCEDURE: Bronchoscopy DETAILS OF PROCEDURE: Bronchoscope was brought to patient's bedside where a time out was performed to confirm correct patient and correct procedure. The patient was already under sedation prior to the procedure. The patient's blood pressure, heart rate, oxygen and respirations were monitored throughout the procedure. The patient experienced no blood loss. The scope was introduced through the tracheostomy. The procedure was not difficult. The patient tolerated the procedure well. There were no apparent adverse events. ANESTHESIA INFORMATION: ASA: ASA status cannot be found on the log. Anesthesia Type: Anesthesia type cannot be found on the log. FINDINGS: The upper trachea, middle trachea, lower trachea and main tracee appeared normal. Moderate, generalized erythematous and friable mucosa in the left main stem, MANDY, LLL, right main stem, RUL, bronchus intermedius, RML and RLL Moderate, thick and purulent secretions present in the left main stem, MANDY, lingula, LLL, right main stem, RUL, bronchus intermedius, RML and RLL; secretions were easily removed by therapeutic aspiration and the airway was cleared SPECIMENS: No sample collected      Impression: Erythematous and friable airways Moderate amount of purulent secretions throughout  RECOMMENDATION:  Continue with hypertonic saline nebs and as needed bronchoscopy for airway clearance  I supervised and was present for the entire procedure Jarett Martins MD [facility]     Echo follow up/limited w/ contrast if indicated    Result Date: 3/17/2024  Narrative:   Left Ventricle: The left ventricular ejection fraction is 70%. Systolic function is vigorous. Global longitudinal strain is reduced at -14%. Wall motion is normal.   Aortic Valve: There is mild regurgitation.   Aorta: The aortic root is mildly dilated. The ascending aorta is mildly dilated. The aortic root is 4.10 cm. The ascending aorta is 4.1 cm.       EKG, Pathology, and Other Studies Reviewed on Admission:   EKG: Reviewed      Counseling / Coordination of Care Time: 30 total mins spent n consult. Greater than 50% of total time spent on patient counseling and coordination of care.    Karelne Millard DO  Gastroenterology Fellow  [facility]  Division of Gastroenterology and Hepatology  Available on [employer]  ...............................................................................................................................................  ** Please Note: This note is constructed using a voice recognition dictation system. **

## 2024-04-16 NOTE — PROGRESS NOTES
Progress Note - Infectious Disease   Carla Hunt 59 y.o. female MRN: [id number]  [facility]: [id number]      Impression/Recommendations:  Sepsis, recurrent since admission.    Due to COVID-19 infection, recurrent pneumonias, cecal volvulus status post surgery and wound dehiscence.  Most recently due to with persistent candidemia, intraabdominal abscess.  Persistent WBC likely multifactorial, partly leukemoid to recent GIB.  Remains critically ill                -Continue antifungals as below   -D/C Zosyn as below to complete treatment course  -Follow temperatures closely  -Recheck CBC diff in AM to monitor infection              -Recheck CMP in AM to monitor renal, liver function on antifungals  -Supportive care as per the primary service     Persistent C. Albicans candidemia.    Initial positive cultures 3/31 are growing Candida albicans, susceptible to fluconazole.  Suspect ileostomy retraction with wound contamination is the initial source.  Now with intraabdominal abscess as above.  MRI brain now shows bilateral infarcts, consideration for possible septic emboli.  Consider endovascular source of infection.  Ophthalmology evaluation negative for endophthalmitis.  TTE x 3 no vegetations but limited views.  SATURNINO unable to be performed as could not pass through the esophagus.  CTA without evidence of mycotic aneurysm.                -Continue IV fluconazole 800mg daily               -Continue micafungin 150mg IV daily              -Follow up repeat blood cultures 4/14, 4/16   -If any further blood cultures remain positive, would readdress GOC     Intra-abdominal abscess.    In setting of prior abdominal surgery, ileostomy as below.  Status post IR drain placement to pelvic collection 4/10 which yielded pus.  Culture shows heavy growth of Candida albicans so likely a source of persistent fungemia.  Improved on repeat CT A/P 4/13.  Status post 14 days IV Zosyn.  Remains on antifungals.                 -Continue IV fluconazole              -Discontinue IV Zosyn to complete treatment course              -Follow drain output closely     Persistent encephalopathy.   Worsened 3/30. MRI brain shows multiple small bilateral foci of acute infarcts as described suggesting embolic etiology.  Status post lumbar puncture 4/10, CSF studies not consistent with meningitis.  Remains significantly impaired.      -Follow mental status closely off sedation   -If fails to improve, would readdress [facility]    Acute hypoxic respiratory failure.  Status post trach.  Multifactorial, with both COVID and bacterial pneumonia contributing.  Also consider persistent inflammation, fibrosis as seems quite steroid responsive in past.  Was on high dose steroids nearly 3 months, now weaned off.  No active concern for pneumonia at this time.  Weaned to PSV.     Recurrent GI bleed, ulcerations.    In setting of gastritis, esophagitis, esophageal ulceration.  GMS, CMV/HSV stains negative on pathology.  Status post EGD 4/13, 4/14 with clipping, epi.  Hgb now stable.     Elevated alk phos/T. bili.    May be related to fluconazole although would expect more elevation in AST/ALT.  Right upper quadrant ultrasound no significant abnormalities.  Spontaneously improving     Recent recurrent bacterial pneumonia.    The patient has completed multiple treatment courses over the hospitalization. Prior BAL cultures with Pseudomonas and stenotrophomonas.  Unclear if pathogens or colonizers.  Repeat CT chest 4/5 with improving but persistent groundglass and consolidative opacities.     Recent severe COVID, present on admission.    Status post steroids, antivirals.  Rapid COVID antigen negative 3/25 and 3/27     Recent cecal volvulus  Noted on abdomen/pelvis CT 2/20.  Patient is status post exploratory laparotomy with ileocecectomy and end ileostomy creation 2/20.   End ileostomy is with ongoing ischemic changes, retraction of ileostomy and fecal contamination  through midline wound.  Status post washout .  Now complicated by abscess, candidemia as above.     B-cell lymphoma  On maintenance rituxan, which is currently postponed.  There is now a hypovascular mass of the thyroid gland enlarging on serial imaging, concern for lymphoma.  IR consulted for biopsy.     I discussed with Dr. Morfin the above plan to continue current antifungals, stop Zosyn, follow clinical status  He agrees with the plan.     Antibiotics:  Fluconazole #14  Micafungin #3  Zosyn #14    Subjective/24 Hour Events:  Patient seen on AM rounds.   at bedside helps provide independent history.  Says she is opening her eyes but seems to be largely unresponsive.  Hgb stable.  No more overt GI bleeding.    Objective:  Vitals:  Temp:  [96.1 °F (35.6 °C)-99.9 °F (37.7 °C)] 97 °F (36.1 °C)  HR:  [] 104  Resp:  [22-31] 26  BP: ()/(50-65) 95/50  SpO2:  [97 %-100 %] 98 %  Temp (24hrs), Av.4 °F (36.9 °C), Min:96.1 °F (35.6 °C), Max:99.9 °F (37.7 °C)  Current: Temperature: (!) 97 °F (36.1 °C)    Physical Exam:   General:  No acute distress  Psychiatric:  Sleeping  Pulmonary:  Normal respiratory excursion without accessory muscle use  Abdomen:  Soft, nondistended  Extremities:  No edema  Skin:  No rashes    Lab Results:  I have personally reviewed pertinent labs.  Results from last 7 days   Lab Units 24  0601 04/15/24  0521 24  2157 24  0521   SODIUM mmol/L 150* 149* 147 153*   POTASSIUM mmol/L 3.4* 3.7 3.8 3.2*   CHLORIDE mmol/L 117* 117* 116* 119*   CO2 mmol/L 15* 16* 15* 18*   BUN mg/dL 76* 73* 73* 76*   CREATININE mg/dL 1.44* 1.23 1.25 1.31*   EGFR ml/min/1.73sq m 40 48 47 44   CALCIUM mg/dL 10.1 9.7 9.7 10.1   AST U/L 15 15  --  14   ALT U/L 27 25  --  25   ALK PHOS U/L 480* 292*  --  307*     Results from last 7 days   Lab Units 24  0601 04/15/24  1733 04/15/24  0521   WBC Thousand/uL 19.45* 18.17* 15.66*   HEMOGLOBIN g/dL 8.0* 8.0* 8.1*   PLATELETS Thousands/uL  69* 77* 72*     Results from last 7 days   Lab Units 04/14/24  0521 04/12/24  0521 04/10/24  2032 04/10/24  0542   BLOOD CULTURE  No Growth at 24 hrs.  No Growth at 24 hrs. Candida albicans*  --  Candida albicans*  Candida albicans*   GRAM STAIN RESULT   --  Budding Yeast with Pseudomycelia*  Yeast* No polys seen*  1+ Yeast* Budding yeast*  Budding yeast*   BODY FLUID CULTURE, STERILE   --   --  3+ Growth of Candida albicans*  --        Imaging Studies:   I have personally reviewed pertinent imaging study reports and images in PACS.    EKG, Pathology, and Other Studies:   I have personally reviewed pertinent reports.

## 2024-04-16 NOTE — PROGRESS NOTES
"Progress Note - General Surgery   Carla Hunt 59 y.o. female MRN: [id number]  Unit/Bed#: MICU 10 Encounter: [id number]    Assessment:  59F with COVID/strep pna, B cell lymphoma on rituxumab, CKD; prolonged hospitalization 2/2 acute hypoxic respiratory failure s/p MICU bedside trach, hospitalization complicated by cecal volvulus s/p ileocectomy, mucus fistula, end ileostomy on 3/13, anticipated poor wound healing on prolonged high dose steroids and neutropenia; candidemia  S/p Or 4/4 for midline wound exploration with mucus fistula and end ileostomy viable appearing and above the fascia, noted nonviable subq tissue debrided and skin bridge between ostomy and midline incision removed, small area of exposed bowel protected and stoma isolated  Course complicated by pelvic abscess s/p IR drain placement on 4/10 and persistent candidemia   DVT with presumed paradoxical emboli  Multiple embolic strokes  S/p aborted SATURNINO 4/11  UGI bleed s/p EGD x 2 with esophageal and gastric ulcers    Vent: PS 8/6, 40%  Tachycardia 100-110, RR 28-31  UOP 800cc  IR drain output not recorded, purulent in bulb  Malecot drain 480cc liquid stool  NGT TF@20    WBC pending (from 18.17)  Hb pending (from 8.0)  Cr pending (from 1.23)    Plan:  Advance tube feeds as tolerated  BID dressing changes to abdominal wound, malecot for isolation of stoma  Appreciate GI recommendations, PPI drip x 72 hours then NG tube dosing  Appreciate ID recommendations, continue diflucan/micafungin/Zosyn  Trend Hb, transfuse PRN for Hb < 7  Rest of care per ICU team    Subjective/Objective     Subjective: No acute events overnight, tolerating tube feeds at 20cc/hr, no BM yesterday per nursing    Objective:     Blood pressure 108/58, pulse 100, temperature (!) 96.8 °F (36 °C), temperature source Axillary, resp. rate (!) 26, height 5' 8\" (1.727 m), weight 88 kg (194 lb), SpO2 98%.,Body mass index is 29.5 kg/m².      Intake/Output Summary (Last 24 hours) at 4/16/2024 " 0911  Last data filed at 4/16/2024 0800  Gross per 24 hour   Intake 1805.42 ml   Output 1355 ml   Net 450.42 ml       Invasive Devices       Peripheral Intravenous Line  Duration             Long-Dwell Peripheral IV (Midline) 04/13/24 Right Basilic 2 days    Peripheral IV 04/13/24 Right;Ventral (anterior) Forearm 2 days              Drain  Duration             Ileostomy RUQ 55 days    Urethral Catheter Three way 18 Fr. 15 days    Abscess Drain Buttock 5 days    NG/OG/Enteral Tube Nasogastric 16 Fr 1 day              Airway  Duration             Surgical Airway Shiley Cuffed 34 days                    Physical Exam:   Gen:    Ill-appearing  CV:      warm, well-perfused  Lungs: PS 8/6, 40%  Abd:     Soft, non-distended, IR drain purulent, malecot with liquid stool output, abdominal wound cleanly dressed  Ext:      no CCE  Neuro: Opens eyes but does not follow commands

## 2024-04-16 NOTE — PROGRESS NOTES
"[facility]  Progress Note  Name: Carla Hunt I  MRN: [id number]  Unit/Bed#: MICU 10 I Date of Admission: 1/10/2024   Date of Service: 4/16/2024 I Hospital Day: 97    Assessment/Plan   Goals of care, counseling/discussion  Assessment & Plan  Code Status: full - Level 1  Ongoing medical optimization without limits at this time. Regan awaits any improvement in mental status. If no improvement will reconsider GOC. He casually mentions time frame of \"a week or so\".   While Regan remains hopeful for gradual improvement and stability to be d/c to rehab, he is also open to goals and appreciates honest information from treatment teams. He is feeling less optimistic about her recovery following diagnosis of fungemia and mental status change.  Continue to address goals of care at spouse's pace  See ACP documents on 4/8 and 4/11    Palliative care patient  Assessment & Plan  Regan continues to be consistently present and invested in patient's care.   Regan and brother (Shahriar) at bedside today  Also supported by her daughter, son, and robust  group of friends, brother, and nat[place] community  Decisional apparatus:  Patient is not competent on my exam today.  If competence is lost, patient's substitute decision maker would default to spouse [place] by [place] Act 169.  Advance Directive / Living Will / POLST:  None on file, unable to complete  Palliative care MSW continues to follow  Pastoral care following for support    Fungemia  Assessment & Plan  Appreciate ID input, awaiting clearance of blood cultures.    Cecal volvulus (HCC)  Assessment & Plan  S/p ileocectomy, mucus fistula, end ileostomy on 3/13  and now subsequent poor wound healing. Undergoing twice daily dressing changes and malecot placed.    Teto marginal zone B-cell lymphoma (HCC)  Assessment & Plan  Previously on maintenance Rituxan therapy. Last treatment in December 2023    Encephalopathy  Assessment & Plan  Multi-specialty " team treating individual factors that may contribute towards AMS. Provigil held at this time.  Identified to have fungemia, treatment started. CRRT initiated on 4/4 (since d/c as no improvement with mental status)  Unfortunately, Carla remains encephalopathic, not interactive during time of encounter.  CT head done 4/9/2024 revealed new hemorrhagic stroke  MRI brain done 4/10/2024 revealed numerous new infarcts and confirmed small hemorrhagic area  Patient's spouse continues to await improvement in mental status as contributing factors are optimized.   Patient has been opening eyes and tracking but not following commands or any purposeful movements.    * Acute respiratory failure with hypoxia (HCC)  Assessment & Plan  Admitted, severe COVID course complicated by incidences of pneumonia  Patient was re-intubated 3/11 with subsequent bedside trach on 3/12.   Was previously on high dose steroids, now weaned.   Patient back on ventilator support, previously on trach collar and using PMV at her best.          Interval history:       No events overnight. Patient is opening eyes and tracking but still no purposeful movements or following commands. Blood cultures of 4/14 and 4/16 negative to date. Hgb stable. Luis Fernando are at bedside.    MEDICATIONS / ALLERGIES:     all current active meds have been reviewed    No Known Allergies    OBJECTIVE:    Physical Exam  Physical Exam  Constitutional:       Appearance: She is ill-appearing.   HENT:      Head: Normocephalic and atraumatic.   Eyes:      Conjunctiva/sclera: Conjunctivae normal.   Cardiovascular:      Comments: hypotensive  Pulmonary:      Comments: Ventilated via trach  Abdominal:      Tenderness: There is no guarding.      Comments: Malecot in place   Genitourinary:     Comments: Urinary catheter in place  Musculoskeletal:         General: Swelling present.   Skin:     General: Skin is warm and dry.   Neurological:      Mental Status: She is alert.       Comments: Eyes open, tracking around the room, but not following commands or any purposeful movement.   Psychiatric:      Comments: calm         Lab Results:   Results from last 7 days   Lab Units 04/16/24  0601 04/15/24  1733 04/15/24  0521   WBC Thousand/uL 19.45* 18.17* 15.66*   HEMOGLOBIN g/dL 8.0* 8.0* 8.1*   HEMATOCRIT % 23.7* 23.5* 23.4*   PLATELETS Thousands/uL 69* 77* 72*   MONO PCT % 0* 3* 2*   EOS PCT % 1 0 0     Results from last 7 days   Lab Units 04/16/24  0601 04/15/24  0521 04/14/24 2157 04/14/24  0521   POTASSIUM mmol/L 3.4* 3.7 3.8 3.2*   CHLORIDE mmol/L 117* 117* 116* 119*   CO2 mmol/L 15* 16* 15* 18*   BUN mg/dL 76* 73* 73* 76*   CREATININE mg/dL 1.44* 1.23 1.25 1.31*   CALCIUM mg/dL 10.1 9.7 9.7 10.1   ALK PHOS U/L 480* 292*  --  307*   ALT U/L 27 25  --  25   AST U/L 15 15  --  14       Imaging Studies: reviewed pertinent studies   EKG, Pathology, and Other Studies: reviewed pertinent studies    Counseling / Coordination of Care    Total floor / unit time spent today 25 minutes. Greater than 50% of total time was spent with the patient and / or family counseling and / or coordination of care. A description of the counseling / coordination of care: time spent assessing patient, communicating with primary team, RN, providing support to family at bedside.

## 2024-04-16 NOTE — ASSESSMENT & PLAN NOTE
"Code Status: full - Level 1  Ongoing medical optimization without limits at this time. Regan awaits any improvement in mental status. If no improvement will reconsider GOC. He casually mentions time frame of \"a week or so\".   While Regan remains hopeful for gradual improvement and stability to be d/c to rehab, he is also open to goals and appreciates honest information from treatment teams. He is feeling less optimistic about her recovery following diagnosis of fungemia and mental status change.  Continue to address goals of care at spouse's pace  See ACP documents on 4/8 and 4/11  "

## 2024-04-17 PROBLEM — K92.2 GIB (GASTROINTESTINAL BLEEDING): Status: ACTIVE | Noted: 2024-01-01

## 2024-04-17 LAB
AAASAAGGREGATION: 88 % (ref 89–100)
AAASAINHIBITION: 12 % (ref 0–11)
AAMA (MAX AMPLITUDE AA): 64.4 MM (ref 51–71)
ABO GROUP BLD BPU: NORMAL
ABO GROUP BLD: NORMAL
ACTFMA(MAX AMPLITUDE ACTF): 25.6 MM (ref 2–19)
ADPADPAGGREGATION: 42.4 % (ref 83–100)
ADPADPINHIBITION: 57.6 % (ref 0–17)
ADPMA(MAX AMPLITUDE ADP): 44.3 MM (ref 45–69)
ALBUMIN SERPL BCP-MCNC: 2.1 G/DL (ref 3.5–5)
ALP SERPL-CCNC: 330 U/L (ref 34–104)
ALT SERPL W P-5'-P-CCNC: 23 U/L (ref 7–52)
ANION GAP SERPL CALCULATED.3IONS-SCNC: 14 MMOL/L (ref 4–13)
ANION GAP SERPL CALCULATED.3IONS-SCNC: 15 MMOL/L (ref 4–13)
AST SERPL W P-5'-P-CCNC: 14 U/L (ref 13–39)
BACTERIA BLD CULT: ABNORMAL
BACTERIA BLD CULT: ABNORMAL
BILIRUB SERPL-MCNC: 1.36 MG/DL (ref 0.2–1)
BLD GP AB SCN SERPL QL: NEGATIVE
BPU ID: NORMAL
BUN SERPL-MCNC: 75 MG/DL (ref 5–25)
BUN SERPL-MCNC: 78 MG/DL (ref 5–25)
BUN SERPL-MCNC: 78 MG/DL (ref 5–25)
BUN SERPL-MCNC: 79 MG/DL (ref 5–25)
CALCIUM ALBUM COR SERPL-MCNC: 11.5 MG/DL (ref 8.3–10.1)
CALCIUM SERPL-MCNC: 10 MG/DL (ref 8.4–10.2)
CALCIUM SERPL-MCNC: 10.2 MG/DL (ref 8.4–10.2)
CALCIUM SERPL-MCNC: 10.2 MG/DL (ref 8.4–10.2)
CALCIUM SERPL-MCNC: 9.8 MG/DL (ref 8.4–10.2)
CFFFLEV: 719 MG/DL (ref 278–581)
CFFMA (FUNCTIONAL FIBRINOGEN MAX AMPLITUDE): 39.4 MM (ref 15–32)
CHLORIDE SERPL-SCNC: 119 MMOL/L (ref 96–108)
CHLORIDE SERPL-SCNC: 119 MMOL/L (ref 96–108)
CHLORIDE SERPL-SCNC: 120 MMOL/L (ref 96–108)
CHLORIDE SERPL-SCNC: 121 MMOL/L (ref 96–108)
CKA(ANGLE): 71.4 DEG (ref 63–78)
CKHR(HEPARINASE REACTION TIME): 10.8 MIN (ref 4.3–8.3)
CKK(CLOT KINETICS): 1.3 MIN (ref 0.8–2.1)
CKMA(MAX AMPLITUDE): 67.3 MM (ref 52–69)
CKR(REACTION TIME): 10.7 MIN (ref 4.6–9.1)
CO2 SERPL-SCNC: 12 MMOL/L (ref 21–32)
CO2 SERPL-SCNC: 13 MMOL/L (ref 21–32)
CO2 SERPL-SCNC: 13 MMOL/L (ref 21–32)
CO2 SERPL-SCNC: 14 MMOL/L (ref 21–32)
CREAT SERPL-MCNC: 1.43 MG/DL (ref 0.6–1.3)
CREAT SERPL-MCNC: 1.48 MG/DL (ref 0.6–1.3)
CREAT SERPL-MCNC: 1.49 MG/DL (ref 0.6–1.3)
CREAT SERPL-MCNC: 1.5 MG/DL (ref 0.6–1.3)
CROSSMATCH: NORMAL
CRTMA(RAPIDTEG MAX AMPLITUDE): 68.1 MM (ref 52–70)
ERYTHROCYTE [DISTWIDTH] IN BLOOD BY AUTOMATED COUNT: 19.5 % (ref 11.6–15.1)
GFR SERPL CREATININE-BSD FRML MDRD: 38 ML/MIN/1.73SQ M
GFR SERPL CREATININE-BSD FRML MDRD: 40 ML/MIN/1.73SQ M
GLUCOSE SERPL-MCNC: 150 MG/DL (ref 65–140)
GLUCOSE SERPL-MCNC: 155 MG/DL (ref 65–140)
GLUCOSE SERPL-MCNC: 180 MG/DL (ref 65–140)
GLUCOSE SERPL-MCNC: 199 MG/DL (ref 65–140)
GLUCOSE SERPL-MCNC: 227 MG/DL (ref 65–140)
GLUCOSE SERPL-MCNC: 249 MG/DL (ref 65–140)
GLUCOSE SERPL-MCNC: 253 MG/DL (ref 65–140)
GLUCOSE SERPL-MCNC: 268 MG/DL (ref 65–140)
GRAM STN SPEC: ABNORMAL
GRAM STN SPEC: ABNORMAL
HCT VFR BLD AUTO: 18.1 % (ref 34.8–46.1)
HCT VFR BLD AUTO: 24.9 % (ref 34.8–46.1)
HCT VFR BLD AUTO: 26.3 % (ref 34.8–46.1)
HGB BLD-MCNC: 10.3 G/DL (ref 11.5–15.4)
HGB BLD-MCNC: 6 G/DL (ref 11.5–15.4)
HGB BLD-MCNC: 8.2 G/DL (ref 11.5–15.4)
HGB BLD-MCNC: 8.8 G/DL (ref 11.5–15.4)
HKHMA(MAX AMPLITUDE KAOLIN): 69.7 MM (ref 53–68)
LACTATE SERPL-SCNC: 2 MMOL/L (ref 0.5–2)
MAGNESIUM SERPL-MCNC: 2.2 MG/DL (ref 1.9–2.7)
MCH RBC QN AUTO: 28.9 PG (ref 26.8–34.3)
MCHC RBC AUTO-ENTMCNC: 32.9 G/DL (ref 31.4–37.4)
MCV RBC AUTO: 88 FL (ref 82–98)
NRBC BLD AUTO-RTO: 5 /100 WBCS
PHOSPHATE SERPL-MCNC: 5.8 MG/DL (ref 2.7–4.5)
PLATELET # BLD AUTO: 55 THOUSANDS/UL (ref 149–390)
POTASSIUM SERPL-SCNC: 3.3 MMOL/L (ref 3.5–5.3)
POTASSIUM SERPL-SCNC: 4.2 MMOL/L (ref 3.5–5.3)
POTASSIUM SERPL-SCNC: 4.3 MMOL/L (ref 3.5–5.3)
POTASSIUM SERPL-SCNC: 5.4 MMOL/L (ref 3.5–5.3)
PROT SERPL-MCNC: 3.6 G/DL (ref 6.4–8.4)
RBC # BLD AUTO: 2.84 MILLION/UL (ref 3.81–5.12)
RH BLD: NEGATIVE
SODIUM SERPL-SCNC: 146 MMOL/L (ref 135–147)
SODIUM SERPL-SCNC: 147 MMOL/L (ref 135–147)
SODIUM SERPL-SCNC: 148 MMOL/L (ref 135–147)
SODIUM SERPL-SCNC: 149 MMOL/L (ref 135–147)
SPECIMEN EXPIRATION DATE: NORMAL
UNIT DISPENSE STATUS: NORMAL
UNIT PRODUCT CODE: NORMAL
UNIT PRODUCT VOLUME: 250 ML
UNIT PRODUCT VOLUME: 350 ML
UNIT RH: NORMAL
WBC # BLD AUTO: 17.04 THOUSAND/UL (ref 4.31–10.16)

## 2024-04-17 PROCEDURE — 85397 CLOTTING FUNCT ACTIVITY: CPT | Performed by: STUDENT IN AN ORGANIZED HEALTH CARE EDUCATION/TRAINING PROGRAM

## 2024-04-17 PROCEDURE — 99233 SBSQ HOSP IP/OBS HIGH 50: CPT | Performed by: INTERNAL MEDICINE

## 2024-04-17 PROCEDURE — 36248 INS CATH ABD/L-EXT ART ADDL: CPT | Performed by: INTERNAL MEDICINE

## 2024-04-17 PROCEDURE — 87070 CULTURE OTHR SPECIMN AEROBIC: CPT | Performed by: SURGERY

## 2024-04-17 PROCEDURE — 87205 SMEAR GRAM STAIN: CPT | Performed by: SURGERY

## 2024-04-17 PROCEDURE — 80048 BASIC METABOLIC PNL TOTAL CA: CPT | Performed by: STUDENT IN AN ORGANIZED HEALTH CARE EDUCATION/TRAINING PROGRAM

## 2024-04-17 PROCEDURE — 85018 HEMOGLOBIN: CPT

## 2024-04-17 PROCEDURE — NC001 PR NO CHARGE: Performed by: EMERGENCY MEDICINE

## 2024-04-17 PROCEDURE — 75774 ARTERY X-RAY EACH VESSEL: CPT | Performed by: INTERNAL MEDICINE

## 2024-04-17 PROCEDURE — 85397 CLOTTING FUNCT ACTIVITY: CPT | Performed by: EMERGENCY MEDICINE

## 2024-04-17 PROCEDURE — 99291 CRITICAL CARE FIRST HOUR: CPT | Performed by: STUDENT IN AN ORGANIZED HEALTH CARE EDUCATION/TRAINING PROGRAM

## 2024-04-17 PROCEDURE — 85027 COMPLETE CBC AUTOMATED: CPT | Performed by: STUDENT IN AN ORGANIZED HEALTH CARE EDUCATION/TRAINING PROGRAM

## 2024-04-17 PROCEDURE — 99223 1ST HOSP IP/OBS HIGH 75: CPT | Performed by: INTERNAL MEDICINE

## 2024-04-17 PROCEDURE — 36556 INSERT NON-TUNNEL CV CATH: CPT | Performed by: INTERNAL MEDICINE

## 2024-04-17 PROCEDURE — 82948 REAGENT STRIP/BLOOD GLUCOSE: CPT

## 2024-04-17 PROCEDURE — 94760 N-INVAS EAR/PLS OXIMETRY 1: CPT

## 2024-04-17 PROCEDURE — 84295 ASSAY OF SERUM SODIUM: CPT

## 2024-04-17 PROCEDURE — 85576 BLOOD PLATELET AGGREGATION: CPT | Performed by: STUDENT IN AN ORGANIZED HEALTH CARE EDUCATION/TRAINING PROGRAM

## 2024-04-17 PROCEDURE — 99151 MOD SED SAME PHYS/QHP <5 YRS: CPT | Performed by: STUDENT IN AN ORGANIZED HEALTH CARE EDUCATION/TRAINING PROGRAM

## 2024-04-17 PROCEDURE — P9040 RBC LEUKOREDUCED IRRADIATED: HCPCS

## 2024-04-17 PROCEDURE — 77001 FLUOROGUIDE FOR VEIN DEVICE: CPT | Performed by: INTERNAL MEDICINE

## 2024-04-17 PROCEDURE — 85027 COMPLETE CBC AUTOMATED: CPT | Performed by: INTERNAL MEDICINE

## 2024-04-17 PROCEDURE — 80053 COMPREHEN METABOLIC PANEL: CPT | Performed by: STUDENT IN AN ORGANIZED HEALTH CARE EDUCATION/TRAINING PROGRAM

## 2024-04-17 PROCEDURE — 94003 VENT MGMT INPAT SUBQ DAY: CPT

## 2024-04-17 PROCEDURE — 86923 COMPATIBILITY TEST ELECTRIC: CPT

## 2024-04-17 PROCEDURE — 86901 BLOOD TYPING SEROLOGIC RH(D): CPT

## 2024-04-17 PROCEDURE — 99024 POSTOP FOLLOW-UP VISIT: CPT | Performed by: SURGERY

## 2024-04-17 PROCEDURE — 94640 AIRWAY INHALATION TREATMENT: CPT

## 2024-04-17 PROCEDURE — 82947 ASSAY GLUCOSE BLOOD QUANT: CPT

## 2024-04-17 PROCEDURE — 87107 FUNGI IDENTIFICATION MOLD: CPT | Performed by: SURGERY

## 2024-04-17 PROCEDURE — 37244 VASC EMBOLIZE/OCCLUDE BLEED: CPT | Performed by: INTERNAL MEDICINE

## 2024-04-17 PROCEDURE — NC001 PR NO CHARGE: Performed by: INTERNAL MEDICINE

## 2024-04-17 PROCEDURE — 85014 HEMATOCRIT: CPT

## 2024-04-17 PROCEDURE — P9017 PLASMA 1 DONOR FRZ W/IN 8 HR: HCPCS

## 2024-04-17 PROCEDURE — 0DC28ZZ EXTIRPATION OF MATTER FROM MIDDLE ESOPHAGUS, VIA NATURAL OR ARTIFICIAL OPENING ENDOSCOPIC: ICD-10-PCS | Performed by: INTERNAL MEDICINE

## 2024-04-17 PROCEDURE — 83605 ASSAY OF LACTIC ACID: CPT | Performed by: STUDENT IN AN ORGANIZED HEALTH CARE EDUCATION/TRAINING PROGRAM

## 2024-04-17 PROCEDURE — 84100 ASSAY OF PHOSPHORUS: CPT

## 2024-04-17 PROCEDURE — 84132 ASSAY OF SERUM POTASSIUM: CPT

## 2024-04-17 PROCEDURE — 80048 BASIC METABOLIC PNL TOTAL CA: CPT

## 2024-04-17 PROCEDURE — 99292 CRITICAL CARE ADDL 30 MIN: CPT | Performed by: STUDENT IN AN ORGANIZED HEALTH CARE EDUCATION/TRAINING PROGRAM

## 2024-04-17 PROCEDURE — 85014 HEMATOCRIT: CPT | Performed by: STUDENT IN AN ORGANIZED HEALTH CARE EDUCATION/TRAINING PROGRAM

## 2024-04-17 PROCEDURE — 82330 ASSAY OF CALCIUM: CPT

## 2024-04-17 PROCEDURE — 76937 US GUIDE VASCULAR ACCESS: CPT | Performed by: INTERNAL MEDICINE

## 2024-04-17 PROCEDURE — 99152 MOD SED SAME PHYS/QHP 5/>YRS: CPT | Performed by: STUDENT IN AN ORGANIZED HEALTH CARE EDUCATION/TRAINING PROGRAM

## 2024-04-17 PROCEDURE — 85384 FIBRINOGEN ACTIVITY: CPT | Performed by: EMERGENCY MEDICINE

## 2024-04-17 PROCEDURE — 83735 ASSAY OF MAGNESIUM: CPT

## 2024-04-17 PROCEDURE — 30233H1 TRANSFUSION OF NONAUTOLOGOUS WHOLE BLOOD INTO PERIPHERAL VEIN, PERCUTANEOUS APPROACH: ICD-10-PCS | Performed by: STUDENT IN AN ORGANIZED HEALTH CARE EDUCATION/TRAINING PROGRAM

## 2024-04-17 PROCEDURE — 36247 INS CATH ABD/L-EXT ART 3RD: CPT | Performed by: INTERNAL MEDICINE

## 2024-04-17 PROCEDURE — 83605 ASSAY OF LACTIC ACID: CPT | Performed by: INTERNAL MEDICINE

## 2024-04-17 PROCEDURE — 82805 BLOOD GASES W/O2 SATURATION: CPT | Performed by: INTERNAL MEDICINE

## 2024-04-17 PROCEDURE — 86900 BLOOD TYPING SEROLOGIC ABO: CPT

## 2024-04-17 PROCEDURE — 99153 MOD SED SAME PHYS/QHP EA: CPT | Performed by: STUDENT IN AN ORGANIZED HEALTH CARE EDUCATION/TRAINING PROGRAM

## 2024-04-17 PROCEDURE — P9037 PLATE PHERES LEUKOREDU IRRAD: HCPCS

## 2024-04-17 PROCEDURE — C9113 INJ PANTOPRAZOLE SODIUM, VIA: HCPCS | Performed by: EMERGENCY MEDICINE

## 2024-04-17 PROCEDURE — 75726 ARTERY X-RAYS ABDOMEN: CPT | Performed by: INTERNAL MEDICINE

## 2024-04-17 PROCEDURE — 86850 RBC ANTIBODY SCREEN: CPT

## 2024-04-17 PROCEDURE — 82803 BLOOD GASES ANY COMBINATION: CPT

## 2024-04-17 PROCEDURE — 85347 COAGULATION TIME ACTIVATED: CPT | Performed by: EMERGENCY MEDICINE

## 2024-04-17 PROCEDURE — P9016 RBC LEUKOCYTES REDUCED: HCPCS

## 2024-04-17 PROCEDURE — 94664 DEMO&/EVAL PT USE INHALER: CPT

## 2024-04-17 PROCEDURE — 80048 BASIC METABOLIC PNL TOTAL CA: CPT | Performed by: INTERNAL MEDICINE

## 2024-04-17 PROCEDURE — NC001 PR NO CHARGE: Performed by: STUDENT IN AN ORGANIZED HEALTH CARE EDUCATION/TRAINING PROGRAM

## 2024-04-17 PROCEDURE — 85018 HEMOGLOBIN: CPT | Performed by: EMERGENCY MEDICINE

## 2024-04-17 PROCEDURE — C9254 INJECTION, LACOSAMIDE: HCPCS | Performed by: STUDENT IN AN ORGANIZED HEALTH CARE EDUCATION/TRAINING PROGRAM

## 2024-04-17 PROCEDURE — 85576 BLOOD PLATELET AGGREGATION: CPT | Performed by: EMERGENCY MEDICINE

## 2024-04-17 PROCEDURE — 43247 EGD REMOVE FOREIGN BODY: CPT | Performed by: INTERNAL MEDICINE

## 2024-04-17 PROCEDURE — 87106 FUNGI IDENTIFICATION YEAST: CPT | Performed by: SURGERY

## 2024-04-17 PROCEDURE — 04L23DZ OCCLUSION OF GASTRIC ARTERY WITH INTRALUMINAL DEVICE, PERCUTANEOUS APPROACH: ICD-10-PCS | Performed by: INTERNAL MEDICINE

## 2024-04-17 PROCEDURE — 85018 HEMOGLOBIN: CPT | Performed by: STUDENT IN AN ORGANIZED HEALTH CARE EDUCATION/TRAINING PROGRAM

## 2024-04-17 RX ORDER — MIDAZOLAM HYDROCHLORIDE 2 MG/2ML
INJECTION, SOLUTION INTRAMUSCULAR; INTRAVENOUS
Status: COMPLETED
Start: 2024-04-17 | End: 2024-04-17

## 2024-04-17 RX ORDER — METOCLOPRAMIDE HYDROCHLORIDE 5 MG/ML
10 INJECTION INTRAMUSCULAR; INTRAVENOUS EVERY 6 HOURS SCHEDULED
Status: COMPLETED | OUTPATIENT
Start: 2024-04-17 | End: 2024-04-18

## 2024-04-17 RX ORDER — MIDAZOLAM HYDROCHLORIDE 2 MG/2ML
2 INJECTION, SOLUTION INTRAMUSCULAR; INTRAVENOUS ONCE
Status: COMPLETED | OUTPATIENT
Start: 2024-04-17 | End: 2024-04-17

## 2024-04-17 RX ORDER — NOREPINEPHRINE BITARTRATE 1 MG/ML
INJECTION, SOLUTION INTRAVENOUS
Status: COMPLETED
Start: 2024-04-17 | End: 2024-04-17

## 2024-04-17 RX ORDER — FENTANYL CITRATE/PF 50 MCG/ML
SYRINGE (ML) INJECTION
Status: COMPLETED
Start: 2024-04-17 | End: 2024-04-17

## 2024-04-17 RX ORDER — CALCIUM GLUCONATE 20 MG/ML
1 INJECTION, SOLUTION INTRAVENOUS ONCE
Status: COMPLETED | OUTPATIENT
Start: 2024-04-17 | End: 2024-04-17

## 2024-04-17 RX ORDER — MIDAZOLAM HYDROCHLORIDE 2 MG/2ML
6 INJECTION, SOLUTION INTRAMUSCULAR; INTRAVENOUS ONCE
Status: DISCONTINUED | OUTPATIENT
Start: 2024-04-17 | End: 2024-04-17

## 2024-04-17 RX ORDER — FENTANYL CITRATE 50 UG/ML
100 INJECTION, SOLUTION INTRAMUSCULAR; INTRAVENOUS ONCE
Status: DISCONTINUED | OUTPATIENT
Start: 2024-04-17 | End: 2024-04-17

## 2024-04-17 RX ORDER — SODIUM CHLORIDE, SODIUM GLUCONATE, SODIUM ACETATE, POTASSIUM CHLORIDE, MAGNESIUM CHLORIDE, SODIUM PHOSPHATE, DIBASIC, AND POTASSIUM PHOSPHATE .53; .5; .37; .037; .03; .012; .00082 G/100ML; G/100ML; G/100ML; G/100ML; G/100ML; G/100ML; G/100ML
250 INJECTION, SOLUTION INTRAVENOUS ONCE
Status: COMPLETED | OUTPATIENT
Start: 2024-04-17 | End: 2024-04-17

## 2024-04-17 RX ORDER — IODIXANOL 320 MG/ML
100 INJECTION, SOLUTION INTRAVASCULAR
Status: COMPLETED | OUTPATIENT
Start: 2024-04-17 | End: 2024-04-17

## 2024-04-17 RX ORDER — FENTANYL CITRATE/PF 50 MCG/ML
SYRINGE (ML) INJECTION
Status: DISCONTINUED
Start: 2024-04-17 | End: 2024-04-17 | Stop reason: WASHOUT

## 2024-04-17 RX ORDER — FENTANYL CITRATE 50 UG/ML
50 INJECTION, SOLUTION INTRAMUSCULAR; INTRAVENOUS ONCE
Status: COMPLETED | OUTPATIENT
Start: 2024-04-17 | End: 2024-04-17

## 2024-04-17 RX ORDER — DEXTROSE MONOHYDRATE 50 MG/ML
75 INJECTION, SOLUTION INTRAVENOUS CONTINUOUS
Status: DISCONTINUED | OUTPATIENT
Start: 2024-04-17 | End: 2024-04-18

## 2024-04-17 RX ORDER — POTASSIUM CHLORIDE 20MEQ/15ML
40 LIQUID (ML) ORAL ONCE
Status: COMPLETED | OUTPATIENT
Start: 2024-04-17 | End: 2024-04-17

## 2024-04-17 RX ORDER — EPINEPHRINE 0.1 MG/ML
INJECTION INTRAVENOUS
Status: DISPENSED
Start: 2024-04-17 | End: 2024-04-17

## 2024-04-17 RX ORDER — IODIXANOL 320 MG/ML
200 INJECTION, SOLUTION INTRAVASCULAR
Status: COMPLETED | OUTPATIENT
Start: 2024-04-17 | End: 2024-04-17

## 2024-04-17 RX ORDER — FENTANYL CITRATE 50 UG/ML
100 INJECTION, SOLUTION INTRAMUSCULAR; INTRAVENOUS ONCE
Status: COMPLETED | OUTPATIENT
Start: 2024-04-17 | End: 2024-04-17

## 2024-04-17 RX ADMIN — LACOSAMIDE 100 MG: 10 INJECTION, SOLUTION INTRAVENOUS at 19:38

## 2024-04-17 RX ADMIN — INSULIN LISPRO 2 UNITS: 100 INJECTION, SOLUTION INTRAVENOUS; SUBCUTANEOUS at 18:34

## 2024-04-17 RX ADMIN — PANTOPRAZOLE SODIUM 40 MG: 40 INJECTION, POWDER, FOR SOLUTION INTRAVENOUS at 20:00

## 2024-04-17 RX ADMIN — LEVALBUTEROL HYDROCHLORIDE 1.25 MG: 1.25 SOLUTION RESPIRATORY (INHALATION) at 23:07

## 2024-04-17 RX ADMIN — FENTANYL CITRATE 50 MCG: 50 INJECTION INTRAMUSCULAR; INTRAVENOUS at 09:59

## 2024-04-17 RX ADMIN — SODIUM CHLORIDE, SODIUM GLUCONATE, SODIUM ACETATE, POTASSIUM CHLORIDE, MAGNESIUM CHLORIDE, SODIUM PHOSPHATE, DIBASIC, AND POTASSIUM PHOSPHATE 250 ML: .53; .5; .37; .037; .03; .012; .00082 INJECTION, SOLUTION INTRAVENOUS at 00:57

## 2024-04-17 RX ADMIN — CALCIUM GLUCONATE 1 G: 20 INJECTION, SOLUTION INTRAVENOUS at 11:12

## 2024-04-17 RX ADMIN — LEVALBUTEROL HYDROCHLORIDE 1.25 MG: 1.25 SOLUTION RESPIRATORY (INHALATION) at 14:26

## 2024-04-17 RX ADMIN — SODIUM CHLORIDE, SODIUM GLUCONATE, SODIUM ACETATE, POTASSIUM CHLORIDE, MAGNESIUM CHLORIDE, SODIUM PHOSPHATE, DIBASIC, AND POTASSIUM PHOSPHATE 250 ML: .53; .5; .37; .037; .03; .012; .00082 INJECTION, SOLUTION INTRAVENOUS at 01:05

## 2024-04-17 RX ADMIN — MIDAZOLAM 2 MG: 1 INJECTION INTRAMUSCULAR; INTRAVENOUS at 11:35

## 2024-04-17 RX ADMIN — LEVALBUTEROL HYDROCHLORIDE 1.25 MG: 1.25 SOLUTION RESPIRATORY (INHALATION) at 01:32

## 2024-04-17 RX ADMIN — Medication 2 SPRAY: at 21:05

## 2024-04-17 RX ADMIN — Medication 800 MG: at 10:00

## 2024-04-17 RX ADMIN — INSULIN LISPRO 2 UNITS: 100 INJECTION, SOLUTION INTRAVENOUS; SUBCUTANEOUS at 01:21

## 2024-04-17 RX ADMIN — IODIXANOL 100 ML: 320 INJECTION, SOLUTION INTRAVASCULAR at 22:20

## 2024-04-17 RX ADMIN — METOCLOPRAMIDE 10 MG: 5 INJECTION, SOLUTION INTRAMUSCULAR; INTRAVENOUS at 18:39

## 2024-04-17 RX ADMIN — IODIXANOL 70 ML: 320 INJECTION, SOLUTION INTRAVASCULAR at 22:20

## 2024-04-17 RX ADMIN — FENTANYL CITRATE 50 MCG: 50 INJECTION, SOLUTION INTRAMUSCULAR; INTRAVENOUS at 11:35

## 2024-04-17 RX ADMIN — HEPARIN SODIUM 5000 UNITS: 5000 INJECTION INTRAVENOUS; SUBCUTANEOUS at 06:10

## 2024-04-17 RX ADMIN — NYSTATIN: 100000 POWDER TOPICAL at 18:34

## 2024-04-17 RX ADMIN — IOHEXOL 100 ML: 350 INJECTION, SOLUTION INTRAVENOUS at 17:16

## 2024-04-17 RX ADMIN — INSULIN LISPRO 3 UNITS: 100 INJECTION, SOLUTION INTRAVENOUS; SUBCUTANEOUS at 06:09

## 2024-04-17 RX ADMIN — NYSTATIN: 100000 POWDER TOPICAL at 09:58

## 2024-04-17 RX ADMIN — PANTOPRAZOLE SODIUM 40 MG: 40 INJECTION, POWDER, FOR SOLUTION INTRAVENOUS at 09:56

## 2024-04-17 RX ADMIN — LEVALBUTEROL HYDROCHLORIDE 1.25 MG: 1.25 SOLUTION RESPIRATORY (INHALATION) at 08:07

## 2024-04-17 RX ADMIN — FENTANYL CITRATE 50 MCG: 50 INJECTION INTRAMUSCULAR; INTRAVENOUS at 11:35

## 2024-04-17 RX ADMIN — NOREPINEPHRINE BITARTRATE 4 MCG/MIN: 1 INJECTION, SOLUTION, CONCENTRATE INTRAVENOUS at 03:00

## 2024-04-17 RX ADMIN — MICAFUNGIN SODIUM 150 MG: 50 INJECTION, POWDER, LYOPHILIZED, FOR SOLUTION INTRAVENOUS at 09:58

## 2024-04-17 RX ADMIN — NOREPINEPHRINE BITARTRATE 4 MG: 1 INJECTION INTRAVENOUS at 03:00

## 2024-04-17 RX ADMIN — FENTANYL CITRATE 50 MCG: 50 INJECTION, SOLUTION INTRAMUSCULAR; INTRAVENOUS at 09:59

## 2024-04-17 RX ADMIN — LACOSAMIDE 100 MG: 10 INJECTION, SOLUTION INTRAVENOUS at 09:56

## 2024-04-17 RX ADMIN — CHLORHEXIDINE GLUCONATE 0.12% ORAL RINSE 15 ML: 1.2 LIQUID ORAL at 20:00

## 2024-04-17 RX ADMIN — SODIUM BICARBONATE 50 MEQ: 84 INJECTION INTRAVENOUS at 18:38

## 2024-04-17 RX ADMIN — CHLORHEXIDINE GLUCONATE 0.12% ORAL RINSE 15 ML: 1.2 LIQUID ORAL at 09:56

## 2024-04-17 RX ADMIN — METOCLOPRAMIDE 10 MG: 5 INJECTION, SOLUTION INTRAMUSCULAR; INTRAVENOUS at 12:11

## 2024-04-17 RX ADMIN — NOREPINEPHRINE BITARTRATE 4 MG: 1 SOLUTION INTRAVENOUS at 20:00

## 2024-04-17 RX ADMIN — INSULIN LISPRO 1 UNITS: 100 INJECTION, SOLUTION INTRAVENOUS; SUBCUTANEOUS at 12:18

## 2024-04-17 RX ADMIN — Medication 2 SPRAY: at 09:57

## 2024-04-17 RX ADMIN — FENTANYL CITRATE 100 MCG: 50 INJECTION INTRAMUSCULAR; INTRAVENOUS at 00:54

## 2024-04-17 RX ADMIN — DEXTROSE 50 ML/HR: 5 SOLUTION INTRAVENOUS at 16:25

## 2024-04-17 RX ADMIN — DESMOPRESSIN ACETATE 26.4 MCG: 4 SOLUTION INTRAVENOUS at 18:30

## 2024-04-17 RX ADMIN — METOCLOPRAMIDE 10 MG: 5 INJECTION, SOLUTION INTRAMUSCULAR; INTRAVENOUS at 09:56

## 2024-04-17 RX ADMIN — POTASSIUM CHLORIDE 40 MEQ: 1.5 SOLUTION ORAL at 02:37

## 2024-04-17 RX ADMIN — MIDAZOLAM HYDROCHLORIDE 2 MG: 2 INJECTION, SOLUTION INTRAMUSCULAR; INTRAVENOUS at 11:35

## 2024-04-17 NOTE — PROGRESS NOTES
Progress Note - General Surgery   Carla Hunt 59 y.o. female MRN: [id number]  Unit/Bed#: MICU 10 Encounter: [id number]    Assessment:  59F with COVID/strep pna, B cell lymphoma on rituxumab, CKD; prolonged hospitalization 2/2 acute hypoxic respiratory failure s/p MICU bedside trach, hospitalization complicated by cecal volvulus s/p ileocectomy, mucus fistula, end ileostomy on 3/13, anticipated poor wound healing on prolonged high dose steroids and neutropenia; candidemia  S/p Or 4/4 for midline wound exploration with mucus fistula and end ileostomy viable appearing and above the fascia, noted nonviable subq tissue debrided and skin bridge between ostomy and midline incision removed, small area of exposed bowel protected and stoma isolated  Course complicated by pelvic abscess s/p IR drain placement on 4/10 and persistent candidemia   DVT with presumed paradoxical emboli  Multiple embolic strokes  S/p aborted SATURNINO 4/11  UGI bleed s/p EGD x 2 with esophageal and gastric ulcers, now with recurrent GI bleeding and hemorrhagic shock    Vent: PS 8/6, 40%  Gtt: Levo@2  Ortiz 945cc  Malecot 1425cc stool  IR drain 30cc purulent  NGT TF@45    WBC pending (from 19.45)  Hb pending (from 6.0 from 7.4 from 8.0, currently receiving 1 unit pRBC)  Cr pending (from 1.40)    Plan:  Follow up GI for repeat endoscopy given GI bleeding  Trend Hb, transfuse PRN for Hb < 7  Check TEG  Wean pressors as able  Hold tube feeds  BID dressing changes to abdominal wound, malecot for isolation of stoma, additional debridement performed this morning, sent for tissue culture  Local wound care to sacral wound, will check again on Friday for additional debridement requirement  Appreciate ID recommendations, continue Diflucan/micafungin, Zosyn discontinued yesterday  Appreciate palliative care recommendations  Rest of care per ICU team    Subjective/Objective     Subjective: Patient with hypotension and recurrent GI bleeding overnight, received  "500cc crystaloid and albumin and now receiving one unit pRBC, requiring low dose levophed, good response to volume, tolerating tube feeds at 45cc/hr, having ileostomy output    Objective:     Blood pressure 109/55, pulse (!) 113, temperature (!) 97.2 °F (36.2 °C), resp. rate 22, height 5' 8\" (1.727 m), weight 88 kg (194 lb), SpO2 98%.,Body mass index is 29.5 kg/m².      Intake/Output Summary (Last 24 hours) at 4/17/2024 0627  Last data filed at 4/17/2024 0617  Gross per 24 hour   Intake 4795.5 ml   Output 2400 ml   Net 2395.5 ml       Invasive Devices       Peripheral Intravenous Line  Duration             Long-Dwell Peripheral IV (Midline) 04/13/24 Right Basilic 3 days    Peripheral IV 04/13/24 Right;Ventral (anterior) Forearm 3 days              Drain  Duration             Ileostomy RUQ 56 days    Urethral Catheter Three way 18 Fr. 16 days    Abscess Drain Buttock 6 days    NG/OG/Enteral Tube Nasogastric 16 Fr 2 days              Airway  Duration             Surgical Airway Shiley Cuffed 35 days                    Physical Exam:   Gen:    NAD  CV:      warm, well-perfused  Lungs: PS 8/6, 40%  Abd:     soft, non-distended, Malecot 1425cc stool  IR drain 30cc purulent  NGT TF@45  Ext:      no CCE  Neuro: Opens eyes, does not follow commands    "

## 2024-04-17 NOTE — PROCEDURES
Procedural Sedation    Date/Time: 4/17/2024 11:00 AM    Performed by: Millicent Medrano MD  Authorized by: Millicent Medrano MD    Procedure details (see MAR for exact dosages):     Preoxygenation: ventilator.    Sedation:  Midazolam    Analgesia:  Fentanyl    Intra-procedure monitoring:  Blood pressure monitoring, cardiac monitor, continuous pulse oximetry and frequent vital sign checks    Intra-procedure events: none      Total sedation time (minutes):  50  Post-procedure details:     Attendance: Constant attendance by certified staff until patient recovered      Recovery: Patient returned to pre-procedure baseline      Post-sedation assessments completed and reviewed: cardiovascular function, hydration status, mental status, pain level and respiratory function      Patient tolerance:  Tolerated well, no immediate complications

## 2024-04-17 NOTE — PROGRESS NOTES
[facility] - Progress Note  Carla Hunt 59 y.o. 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 Had EGD x2 4/13/24 and 4/14/24    Multiple UGIB 2/2 esophageal and gastric ulcerations  S/p EGD 4/13/24 and 4/14/24, w actively bleeding ulcer on 4/13/24 scope (tx w epinephrine and endoclips x2); also had severe esophagitisa nd ulcerations w stomach obscured by clot. 4/14/24 clot was removed and underlying Zaki 1A gastric ulcer seen and treated (epi, clips, bipolar cautery).   Currently pt w decreased Hg and c/f rebleed with blood tinged NG tube output.   Plan for bedside EGD +/- ileoscopy   Trend Hg q8 hrs, maintain 2 large bore IVs, transfuse for Hg < 7.0  Pantoprazole gtt  S/p metoclopramide injection   ______________________________________________________________________    SUBJECTIVE:     Called back by ICU team in s/o Hg drop and bloody NG output. Hg pratima 6.0, received 1 U pRBCs w Hg response to 8.2.     Scheduled Meds:  Current Facility-Administered Medications   Medication Dose Route Frequency Provider Last Rate    acetaminophen  650 mg Oral Q6H PRN Mahamed Cardenas DO      albuterol  2.5 mg Nebulization Q4H PRN Deonte Mills MD      calcium gluconate  1 g Intravenous Once Millicent Medrano MD      chlorhexidine  15 mL Mouth/Throat Q12H SARTHAK Mahamed Cardenas DO      dextrose  75 mL/hr Intravenous Continuous Tanika Silveira MD Stopped (04/17/24 0226)    EPINEPHrine           fluconazole  800 mg Intravenous Q24H Mahamed P  "Cardenas,  mg (04/17/24 1000)    insulin lispro  1-6 Units Subcutaneous Q6H [facility] Susan Prince, DO      lacosamide  100 mg Intravenous Q12H Taiwo Preciado, DO      lactulose  20 g Oral BID Clinton Mays MD      levalbuterol  1.25 mg Nebulization Q6H Deonte Mills MD      metoclopramide  10 mg Intravenous Q6H [facility] Livan Morfin, DO      micafungin  150 mg Intravenous Q24H Torrie Mills  mg (04/17/24 0958)    norepinephrine  1-30 mcg/min Intravenous Titrated Jerardo Mir, DO Stopped (04/17/24 0641)    nystatin   Topical BID Mahamed P Cardenas, DO      ondansetron  4 mg Intravenous Q6H PRN Mahamed P Cardenas, DO      pantoprazole  40 mg Intravenous Q12H [facility] Millicent Medrano MD      sodium bicarbonate  650 mg Oral BID after meals Livan Morfin, DO      sodium chloride  1 Application Nasal Q1H PRN Mahamed P Cardenas, DO      sodium chloride  2 spray Each Nare BID Mahamed P Cardenas, DO       Continuous Infusions:dextrose, 75 mL/hr, Last Rate: Stopped (04/17/24 0226)  norepinephrine, 1-30 mcg/min, Last Rate: Stopped (04/17/24 0641)      PRN Meds:.  acetaminophen    albuterol    EPINEPHrine    ondansetron    sodium chloride    OBJECTIVE:     Objective   Blood pressure (!) 77/43, pulse (!) 109, temperature 99 °F (37.2 °C), resp. rate (!) 35, height 5' 8\" (1.727 m), weight 88 kg (194 lb), SpO2 96%. Body mass index is 29.5 kg/m².    Intake/Output Summary (Last 24 hours) at 4/17/2024 1040  Last data filed at 4/17/2024 0617  Gross per 24 hour   Intake 4165.08 ml   Output 1635 ml   Net 2530.08 ml       PHYSICAL EXAM:   General Appearance: Trached  Abdomen: Soft, non-tender, non-distended; no masses or no organomegaly    Invasive Devices       Peripheral Intravenous Line  Duration             Long-Dwell Peripheral IV (Midline) 04/13/24 Right Basilic 3 days    Peripheral IV 04/13/24 Right;Ventral (anterior) Forearm 3 days              Drain  Duration             Ileostomy RUQ 56 days    Urethral " Catheter Three way 18 Fr. 16 days    Abscess Drain Buttock 6 days    NG/OG/Enteral Tube Nasogastric 16 Fr 2 days              Airway  Duration             Surgical Airway Shiley Cuffed 35 days                    LAB RESULTS:      Lab Units 04/17/24  0844 04/17/24  0101 04/16/24  1817 04/16/24  1446 04/16/24  0601 04/15/24  0521 04/14/24  2157 04/14/24  0521 04/13/24  0554   SODIUM mmol/L 149* 147 148* 151* 150* 149*   < > 153* 150*   POTASSIUM mmol/L 4.2 3.3* 3.5 3.6 3.4* 3.7   < > 3.2* 3.5   CHLORIDE mmol/L 121* 119* 120* 121* 117* 117*   < > 119* 118*   CO2 mmol/L 13* 13* 13* 14* 15* 16*   < > 18* 17*   BUN mg/dL 78* 79* 81* 80* 76* 73*   < > 76* 70*   CREATININE mg/dL 1.48* 1.49* 1.40* 1.46* 1.44* 1.23   < > 1.31* 1.36*   GLUCOSE RANDOM mg/dL 249* 268* 290* 204* 175* 179*   < > 127 254*   CALCIUM mg/dL 10.0 9.8 9.9 10.2 10.1 9.7   < > 10.1 9.9   MAGNESIUM mg/dL 2.2  --   --   --  2.3 1.7*  --  1.9 2.0   PHOSPHORUS mg/dL 5.8*  --   --   --  6.3* 6.0*  --  5.9* 5.1*    < > = values in this interval not displayed.            Lab Units 04/17/24  0844 04/16/24  0601 04/15/24  0521 04/14/24  0521 04/13/24  0554 04/10/24  0543 04/09/24  1652 04/09/24  0446 04/07/24  0508 04/05/24  0522 04/04/24  0752 03/19/24  0543 03/10/24  0513   TOTAL PROTEIN g/dL 3.6* 4.0* 3.7* 3.8* 4.0*   < >  --  4.3* 4.3* 5.2* 4.7*   < > 5.5*   ALBUMIN g/dL 2.1* 2.2* 2.1* 2.5* 2.0*   < >  --  2.4* 2.3* 2.4* 2.2*   < > 2.9*   TOTAL BILIRUBIN mg/dL 1.36* 1.56* 1.67* 1.48* 1.30*   < >  --  2.09* 1.50* 1.42* 1.46*   < > 0.59   BILIRUBIN DIRECT mg/dL  --   --   --   --   --   --   --  1.37* 1.00* 0.88* 0.96*  --  0.22*   AST U/L 14 15 15 14 16   < >  --  23 24 20 21   < > 18   ALT U/L 23 27 25 25 31   < >  --  35 37 32 34   < > 31   ALK PHOS U/L 330* 480* 292* 307* 743*   < >  --  759* 900* 346* 381*   < > 90   GGT U/L  --   --   --   --   --   --  328*  --   --   --   --   --   --     < > = values in this interval not displayed.           Lab Units  04/17/24  0844 04/17/24  0223 04/16/24  1817 04/16/24  0601 04/15/24  1733 04/15/24  0521 04/14/24  2157   WBC Thousand/uL 17.04*  --   --  19.45* 18.17* 15.66* 16.43*   HEMOGLOBIN g/dL 8.2* 6.0* 7.4* 8.0* 8.0* 8.1* 8.5*   HEMATOCRIT % 24.9* 18.1* 21.9* 23.7* 23.5* 23.4* 24.6*   PLATELETS Thousands/uL 55*  --   --  69* 77* 72* 68*   MCV fL 88  --   --  84 83 83 83       Lab Results   Component Value Date    IRON <10 (L) 03/03/2024    TIBC <173 (L) 03/03/2024    FERRITIN 974 (H) 03/03/2024       Lab Results   Component Value Date    INR 1.40 (H) 04/09/2024    INR 1.34 (H) 04/08/2024    INR 1.40 (H) 04/07/2024    PROTIME 17.0 (H) 04/09/2024    PROTIME 16.4 (H) 04/08/2024    PROTIME 17.0 (H) 04/07/2024       RADIOLOGY RESULTS:   Procedure: US thyroid    Result Date: 4/16/2024  Narrative: THYROID ULTRASOUND INDICATION: f/up thyroidmegaly. COMPARISON: 4/5/2024 TECHNIQUE: Ultrasound of the thyroid was performed with a high frequency linear transducer in transverse and sagittal planes including volumetric imaging sweeps as well as traditional still imaging technique. FINDINGS: The study is limited due to patient positioning and tracheostomy. Thyroid texture: Thyroid parenchyma is diffusely heterogeneous in echotexture. Right lobe: 4.9 x 3.0 x 2.9 cm. Volume 20.0 mL Left lobe: Resected. No discrete thyroid nodule is seen.     Impression: Limited examination. Heterogeneous appearance of the right thyroid lobe without discrete dominant nodule identified. Reference: ACR Thyroid Imaging, Reporting and Data System (TI-RADS): White Paper of the ACR TI-RADS Committee. J AM Maria G Radiol 2017;14:587-595. Additional recommendations based on American Thyroid Association 2015 guidelines. Workstation performed: [id number]     Procedure: Echo follow up/limited w/ contrast if indicated    Result Date: 4/15/2024  Narrative:   Left Ventricle: Left ventricular cavity size is normal. Wall thickness is normal. The left ventricular ejection  fraction is 65%. Systolic function is vigorous. Although no diagnostic regional wall motion abnormality was identified, this possibility cannot be completely excluded on the basis of this study. Unable to assess diastolic function.   Right Ventricle: Right ventricular cavity size is normal. Systolic function is normal.   Aortic Valve: There is mild regurgitation with a centrally directed jet. There is aortic valve sclerosis.   Tricuspid Valve: There is mild regurgitation.   No obvious valvular vegetations seen.   Study quality was poor. A limited 2D echo was performed.     Procedure: XR chest portable ICU    Result Date: 4/15/2024  Narrative: XR CHEST PORTABLE ICU INDICATION: NGT placement. COMPARISON: 04/08/2024 FINDINGS: Clear lungs. No pneumothorax or pleural effusion. Midline tracheostomy. Enteric tube extends into the stomach. Normal cardiomediastinal silhouette. Bones are unremarkable for age. Normal upper abdomen.     Impression: No acute cardiopulmonary disease. Enteric tube extends into the stomach. Workstation performed: [id number]     Procedure: EGD    Result Date: 4/14/2024  Narrative: Table formatting from the original result was not included. [facility] [address] [facility] [address] [phone] DATE OF SERVICE: 4/14/24 PHYSICIAN(S): Attending: No Staff Documented Fellow: No Staff Documented INDICATION: Hematemesis, Hemorrhagic shock (HCC), Gastric ulcer POST-OP DIAGNOSIS: See the impression below. PREPROCEDURE: Informed consent was obtained for the procedure, including sedation.  Risks of perforation, hemorrhage, adverse drug reaction and aspiration were discussed. The patient was placed in the left lateral decubitus position. Patient was explained about the risks and benefits of the procedure. Risks including but not limited to bleeding, infection, and perforation were explained in detail. Also explained about less than 100% sensitivity with the exam and other  alternatives. PROCEDURE: EGD DETAILS OF PROCEDURE: Patient was taken to the procedure room where a time out was performed to confirm correct patient and correct procedure. The patient was already under sedation prior to the procedure. The patient's blood pressure, heart rate, level of consciousness, respirations, oxygen, ECG and ETCO2 were monitored throughout the procedure. The scope was introduced through the mouth and advanced to the second part of the duodenum. Retroflexion was performed in the fundus. The patient experienced no blood loss. The procedure was not difficult. The patient tolerated the procedure well. There were no apparent adverse events. ANESTHESIA INFORMATION: ASA: ASA status cannot be found on the log. Anesthesia Type: Anesthesia type cannot be found on the log. MEDICATIONS: Totals unavailable because the procedure time range is not set FINDINGS: Prior treated esophageal seen again with prior placed clips. No active bleeding. Significant amount of blood clotting in the stomach, which obscured visualization. Removed with suction, snare and retrieval basket. Single small, superficial, irregular ulcer in the stomach with spurting hemorrhage (Zaki IA); placed 4 clips successfully (clips are MRI conditional); hemostasis achieved; induced coagulation and hemostasis achieved with with bipolar cautery; injected 2 mL of epinephrine to address bleeding; hemostasis achieved Attempted to pass OVESCO clip but unable to traverse UES stricture. Good window for PEG tube visualized in case required in future. Other SPECIMENS: * Cannot find log *     Impression: Significant clotting in the stomach, clots were removed for evaluation of underlying mucosa. Multiple gastric ulcers visualized, one of the with adherant clot which was suctioned revealing spurting hemorrhage (Zaki 1A). Treated with placement of 4 clips, 2 cc epinephrine injection and bipolar cautery. Attempted to pass the OVESCO clip but  unsuccessful given UES stricture. Previously treated esophageal ulcer without bleeding. Good window for PEG if required in the future. NG tube replaced. RECOMMENDATION:  Schedule repeat EGD Xray to confirm NG placement Avoid Tube feeds for 24 hours atleast. Ok to use NG tube for medications or suction. Will suggest low intermittent suction if required. If suspicion for re-bleeding, consider repeat endoscopy. She remains at risk for re bleeding given overall clinical picture. IV PPI BID Repeat EGD in 3 months pending clinical progression. D/W ICU team and family. No Staff Documented     Narrative/Impressions - 3 day look back     GI RESULTS:   01/10/24    EGD    Impression:  Significant clotting in the stomach, clots were removed for evaluation of  underlying mucosa.  Multiple gastric ulcers visualized, one of the with adherant clot which  was suctioned revealing spurting hemorrhage (Zaki 1A). Treated with  placement of 4 clips, 2 cc epinephrine injection and bipolar cautery.  Attempted to pass the OVESCO clip but unsuccessful given UES stricture.  Previously treated esophageal ulcer without bleeding.  Good window for PEG if required in the future.  NG tube replaced.    RECOMMENDATION:  Schedule repeat EGD    Xray to confirm NG placement  Avoid Tube feeds for 24 hours atleast. Ok to use NG tube for medications  or suction. Will suggest low intermittent suction if required.  If suspicion for re-bleeding, consider repeat endoscopy.  She remains at risk for re bleeding given overall clinical picture.  IV PPI BID  Repeat EGD in 3 months pending clinical progression.    D/W ICU team and family.  No Staff Documented      EGD    Impression:  Single ulcer in mid-esophagus with active oozing (Zaki 1B); hemostasis  achieved with placement of 2 clips and epinephrine infiltration.  Severe ulcerations and esophagitis.  Significant blood clotting in the stomach obscuring visualization despite  aggressive irrigation, suction  and patient repositioning. Visualized areas  showed multiple areas of linear ulcerations with clean base. Posterior  wall of the stomach could not be adequately visualized.  Normal duodenum.  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    RECOMMENDATION:  Schedule follow-up procedure for continued treatment    Avoid NG tube placement for 24-48 hours. Will consider endoscopic NG tube  placement at the time.  IV PPI BID till in the hospital.  IV Reglan Q6H.  Consider second look endoscopy depending on the clinical course.  NPO till further evaluation.    Recommendations discussed with ICU team at bedside.    Alireza Lea MD      EGD    Impression:  Ulcerated mucosa in the upper third of the esophagus without evidence of  hemorrhage  Mild esophagitis  Blood clotting in the body of the stomach and antrum again limiting  visualization, but without worsening from previous exam  The duodenal bulb and 2nd part of the duodenum appeared normal.    RECOMMENDATION:  NG tube placed, ok to use for meds, tube feeds  Continue PPI iv bid  Monitor NG tube output, stools  Trend Hb  Follow up previous path    No Staff Documented      EGD    Impression:  Ulcerated esophageal tear 15 cm from incisors with slow oozing initially  after clot clearance with cessation of hemorrhage at end of exam  Blood and extensive clot present in the esophagus and stomach  The duodenal bulb and 2nd part of the duodenum appeared normal.  Multiple, small ulcers and trauma throughout stomach with clean base  (Zaki III); performed cold forceps biopsy    RECOMMENDATION:  Await pathology results  Await pathology results  Continue PPI iv bid  Give Reglan 10 mg iv q8h  Hold on further enteral access for 24 hrs  Will plan for repeat EGD tomorrow to eval the areas of tear  Special specimen send for HSV, CMV and candidiasis  NG tube trauma (difficult placement) possible cause for esophageal  superficial tear  Erosive gastritis likely due to high steroids, NG suction  trauma vs.  Possible infectious etiology in setting of immunosuppressive therapy    MD Miquel Peña M.D.  PGY-5 Gastroenterology Fellow  [facility]  Available on [employer]  [email or web address]

## 2024-04-17 NOTE — RESPIRATORY THERAPY NOTE
RT Ventilator Management Note  Carla Hunt 59 y.o. female MRN: [id number]  Unit/Bed#: [facility] 10 Encounter: [id number]      Daily Screen         4/17/2024  0807 4/17/2024  1222          Patient safety screen outcome:: Passed Failed (P)       Not Ready for Weaning due to:: Underline problem not resolved Underline problem not resolved (P)                 Physical Exam:   Assessment Type: (P) Assess only  General Appearance: (P) Sleeping  Respiratory Pattern: (P) Spontaneous  Chest Assessment: (P) Chest expansion symmetrical  Bilateral Breath Sounds: Diminished  Suction: Trach  O2 Device: G5 vent      Resp Comments: (P) Patient places on ASV by .

## 2024-04-17 NOTE — RESPIRATORY THERAPY NOTE
RT Ventilator Management Note  Carla Hunt 59 y.o. female MRN: [id number]  Unit/Bed#: [facility] 10 Encounter: [id number]      Daily Screen         4/16/2024  0714 4/17/2024  0807          Patient safety screen outcome:: Passed Passed      Not Ready for Weaning due to:: Underline problem not resolved Underline problem not resolved                Physical Exam:   Assessment Type: During-treatment  General Appearance: Sleeping  Respiratory Pattern: Spontaneous  Chest Assessment: Chest expansion symmetrical  Bilateral Breath Sounds: Diminished  Suction: Trach  O2 Device: G5 vent      Resp Comments: Recieved pt on PS 8/6, udn tx given, will cont to eh pt per resp prot.

## 2024-04-17 NOTE — PROGRESS NOTES
Progress Note - Infectious Disease   Carla Hunt 59 y.o. female MRN: [id number]  Unit/Bed#: MICU 10 Encounter: [id number]      Impression/Recommendations:  Sepsis, recurrent since admission.    Multifactorial due to COVID-19 infection, recurrent pneumonias, cecal volvulus status post surgery and wound dehiscence.  Most recently due to with persistent candidemia, intraabdominal abscess.  Persistent WBC likely multifactorial, partly leukemoid to ongoing GIB.  Remains critically ill.                 -Continue antifungals as below              -Continue to follow closely off antibiotics  -Follow temperatures closely  -Recheck CBC diff in AM to monitor infection              -Recheck CMP in AM to monitor renal, liver function on antifungals  -Supportive care as per the primary service     Persistent C. Albicans candidemia.    Initial positive cultures 3/31 are growing Candida albicans, susceptible to fluconazole.  Suspect ileostomy retraction with wound contamination is the initial source.  Now with intraabdominal abscess as above.  MRI brain now shows bilateral infarcts, consideration for possible septic emboli.  Consider endovascular source of infection.  Ophthalmology evaluation negative for endophthalmitis.  TTE x 3 no vegetations but limited views.  SATURNINO unable to be performed as could not pass through the esophagus.  CTA without evidence of mycotic aneurysm.  Repeat HARRY testing shows susceptibility to micafungin.                 -Continue IV fluconazole 800mg daily               -Continue micafungin 150mg IV daily              -Follow up repeat blood cultures 4/14 (drawn prior to micafungin), 4/16              -If repeat blood culture 4/14 remain negative would D/C micafungin  -If any further blood cultures remain positive, would readdress GOC     Intra-abdominal abscess.    In setting of prior abdominal surgery, ileostomy as below.  Status post IR drain placement to pelvic collection 4/10 which yielded pus.   Culture shows heavy growth of Candida albicans so likely a source of persistent fungemia.  Improved on repeat CT A/P 4/13.  Status post 14 days IV Zosyn.  Remains on antifungals.                 -Continue IV fluconazole              -Continue to follow closely off antibiotics              -Follow drain output closely     Large abdominal wall wound  Due to progressive ischemic changes of ileostomy.  Status post washout 4/4.  Now complicated by abscess, candidemia as above.  Status post bedside debridement 4/17.  Tissue culture sent.     -Continue LWC per surgery   -Follow up tissue cultures although unclear how to interpret as no danie purulence or infection described    Recurrent GI bleed, ulcerations.    In setting of gastritis, esophagitis, esophageal ulceration.  GMS, CMV/HSV stains negative on pathology.  Status post EGD 4/13, 4/14 with clipping, epi.  Recurrent BRB from NGT again this AM     -Repeat EGD per GI   -Check serial Hgbs   -PRBCs, supportive care per [facility]    Persistent encephalopathy.   Worsened 3/30. MRI brain shows multiple small bilateral foci of acute infarcts as described suggesting embolic etiology.  Status post lumbar puncture 4/10, CSF studies not consistent with meningitis.  Remains significantly impaired.                 -Follow mental status closely off sedation              -If fails to improve, would readdress [facility]     Acute hypoxic respiratory failure.  Status post trach.  Multifactorial, with both COVID and bacterial pneumonia contributing.  Also consider persistent inflammation, fibrosis as seems quite steroid responsive in past.  Was on high dose steroids nearly 3 months, now weaned off.  No active concern for pneumonia at this time.  Weaned to PSV.     Elevated alk phos/T. bili.    May be related to fluconazole although would expect more elevation in AST/ALT.  Right upper quadrant ultrasound no significant abnormalities.  Spontaneously improving     Recent recurrent bacterial  pneumonia.    The patient has completed multiple treatment courses over the hospitalization. Prior BAL cultures with Pseudomonas and stenotrophomonas.  Unclear if pathogens or colonizers.  Repeat CT chest  with improving but persistent groundglass and consolidative opacities.     Recent severe COVID, present on admission.    Status post steroids, antivirals.  Rapid COVID antigen negative 3/25 and 3/27     Recent cecal volvulus  Noted on abdomen/pelvis CT .  Patient is status post exploratory laparotomy with ileocecectomy and end ileostomy creation .        B-cell lymphoma  On maintenance rituxan, which is currently postponed.  There is now a hypovascular mass of the thyroid gland enlarging on serial imaging, concern for lymphoma.  U/S showed no discrete nodule.     I discussed with Dr. Morfin the above plan to continue current antifungals, follow clinical status.  He agrees with the plan.    Dr. Sr will assume care .     Antibiotics:  Fluconazole #15  Micafungin #4  Off antibiotics #1    Subjective/24 Hour Events:  Patient seen on AM rounds.  New bright red blood from NGT this AM.  Repeat EGD pending.  Had abdominal dressing change this AM.   at bedside helps provide independent history.  Not opening eyes today.  Got some Fentanyl O/N for abdominal dressing change.    Objective:  Vitals:  Temp:  [96.1 °F (35.6 °C)-101.1 °F (38.4 °C)] 99 °F (37.2 °C)  HR:  [] 109  Resp:  [10-38] 35  BP: ()/(40-60) 77/43  SpO2:  [95 %-100 %] 96 %  Temp (24hrs), Av.3 °F (36.8 °C), Min:96.1 °F (35.6 °C), Max:101.1 °F (38.4 °C)  Current: Temperature: 99 °F (37.2 °C)    Physical Exam:   General:  No acute distress  Psychiatric:  Unresponsive  Pulmonary:  On PSV without accessory muscle use  GI:  BRB in NGT  Extremities:  No edema  Skin:  No rashes    Lab Results:  I have personally reviewed pertinent labs.  Results from last 7 days   Lab Units 24  0844 24  0101  04/16/24  1817 04/16/24  1446 04/16/24  0601 04/15/24  0521   SODIUM mmol/L 149* 147 148*   < > 150* 149*   POTASSIUM mmol/L 4.2 3.3* 3.5   < > 3.4* 3.7   CHLORIDE mmol/L 121* 119* 120*   < > 117* 117*   CO2 mmol/L 13* 13* 13*   < > 15* 16*   BUN mg/dL 78* 79* 81*   < > 76* 73*   CREATININE mg/dL 1.48* 1.49* 1.40*   < > 1.44* 1.23   EGFR ml/min/1.73sq m 38 38 41   < > 40 48   CALCIUM mg/dL 10.0 9.8 9.9   < > 10.1 9.7   AST U/L 14  --   --   --  15 15   ALT U/L 23  --   --   --  27 25   ALK PHOS U/L 330*  --   --   --  480* 292*    < > = values in this interval not displayed.     Results from last 7 days   Lab Units 04/17/24  0844 04/17/24  0223 04/16/24  1817 04/16/24  0601 04/15/24  1733   WBC Thousand/uL 17.04*  --   --  19.45* 18.17*   HEMOGLOBIN g/dL 8.2* 6.0* 7.4* 8.0* 8.0*   PLATELETS Thousands/uL 55*  --   --  69* 77*     Results from last 7 days   Lab Units 04/16/24  0601 04/14/24  0521 04/12/24  0521 04/10/24  2032   BLOOD CULTURE  Received in Microbiology Lab. Culture in Progress.  Received in Microbiology Lab. Culture in Progress. No Growth at 48 hrs.  No Growth at 48 hrs. Candida albicans*  Candida albicans*  --    GRAM STAIN RESULT   --   --  Yeast*  Budding Yeast with Pseudomycelia* No polys seen*  1+ Yeast*   BODY FLUID CULTURE, STERILE   --   --   --  3+ Growth of Candida albicans*       Imaging Studies:   I have personally reviewed pertinent imaging study reports and images in PACS.    EKG, Pathology, and Other Studies:   I have personally reviewed pertinent reports.

## 2024-04-17 NOTE — WOUND OSTOMY CARE
Attempted to see patient today for wound care. Per nursing, patient not stable enough for wound care assessment today. Will attempt to see patient tomorrow.         Molly LAYNEN, RN, CWOCN

## 2024-04-17 NOTE — PROGRESS NOTES
Interventional Radiology Note    Patient with gastric bleed, s/p endoscopy with clip placement, now with ongoing bleed seen on recent CTA. Patient has tachycardia and hypotension, with ongoing pressor and transfusion requirement.    We will proceed with angiogram with possible embolization. Consent was obtained from patient's spouse by phone.    Dwight Schafer MD  Interventional Radiology

## 2024-04-17 NOTE — PROGRESS NOTES
[facility]  Progress Note: Critical Care  Name: Carla Hunt 59 y.o. female I MRN: [id number]  Unit/Bed#: MICU 10 I Date of Admission: 1/10/2024   Date of Service: 4/17/2024 I Hospital Day: 98    Assessment/Plan   Acute metabolic encephalopathy  Cerebral embolic infarcts with known PFO and concern for septic embolic source with fungemia  RLE DVT s/p IVC filter 4/9/24  ABLA from gastric and esophageal ulcers s/p EGD x2 with clips  Cecal volvulus s/p ileectomy with poor wound healing  Candidemia d/t abdominal abscess  Hypoxemic respiratory failure s/p trach  Thyroidmegaly   Hypernatremia  Metabolic acidosis  Hx of COVID pneumonia s/p steroids and remdesivir  Hx of seizures s/p lobectomy on Vimpat  Hx of B-cell lymphoma was previously on Rituximab     Neuro:  Acute metabolic encephalopathy   Most recent imaging 4/10/24: confirms likely [septic] embolic strokes from prior scans + several new small areas of infarction + a small new hemorrhage in the left inferior parietal lobe.  LP 4/10/24: ME panel negative, autoimmune panel negative, CSF cx and gram stain negative.   Azotemia d/t GIB with elevated ammonia s/p EGD x2 and start lactulose to goal 2-3 Bms per day.   CVA  Most recent imaging 4/10/24: confirms likely [septic] embolic strokes from prior scans + several new small areas of infarction + a small new hemorrhage in the left inferior parietal lobe.  SATURNINO requested to assess for endocarditis in light of brain MRI concerning for septic emboli in setting of fungemia, but aborted due to difficult access to esophagus despite visual aid by bronchoscope.  F/up TTE without evidence of valvular vegetations  Patient does have a PFO on bubble study with a DVT in the RLE which also contributes to stroke risk   Holding ASA, c/w DVT ppx with heparin if platelets >50K  Hx of Seizure  Continue home Vimpat  S/p partial temporal lobe resection in 2007   Anxiety   ICU delirium precautions.      CV:  Sinus tachycardia  Etiology likely secondary to infectious processes (fungemia, intra-abdominal infection related to ostomy, pneumonia) which are being addressed  Known PFO  Most recent ECHO 4/15/24: Ef 65%, unable to assess DD, mild AI  Hypotension   Overnight became hypotensive after analgesia for dressing change. Given 500 cc isolyte repeat H&H which was 6.2; 2 units PRBCs given.      Pulm:  Hypoxemic respiratory failure s/p trach;  Hx of COVID pneumonia s/p steroids and remdesivir   Hx of recurrent bacterial pna, + stenotrophomona s/p tx with minocycline  SBT can do pressure support qhs     GI:  Cecal volvulus s/p ileectomy with poor wound healing  F/up wound cultures  Esophageal and gastric ulcers, CMV/HSV negative  S/p EGD x2 with clips, epi, and cautery  Protonix 40 mg BID  Intra-abdominal abscess s/p IR drain with purulent drainage and candida albicans  See plan under ID  off zosyn, c/w IV fluconazole & micfungin   ID following  General surgery following  Elevated ammonia  D/t high dose IV fluconazole, c/w lactulose to goal 2-3 Bms per dy  5.   ABLA wound vs esophageal vs gastric ulcers  A. NGT suction, if bloody will plan on rescope with GI this afternoon     :  Uremia  D/t UGIB s/p EGD x2 with clipping, epi, and cautry  Metabolic acidosis  When sodium normalizes, consider sodium bicarb gtt   Hypernatremia   Off D5W; increase FWF  Elevated Cr  Persistently off CRRT  Monitor Cr  Closely monitor UOP, goal 1.4L     F/E/N:  F: consider D5W if Na does not improve with FWF; free water deficit of 3.1L.  E: monitor and replete for goal K>4, P>3, Mg>2  N: tube feeds with Nepro 45 cc/h, Prosource liquid protein to 1 packet, Refugio BID to support wound healing, 200 cc FWF q4h         Heme/Onc:  Pancytopenia, improving with intermittent plt/prbc's transfusion, s/p Filgastrim x3  In setting of hx of B cell lymphoma, prior chemo, Rituxan therapy, prior abx and present meds including lamictal  Hemo onc consulted,  empirically given Filgastrim 300mg qd x3d (completed 3/30); IR bone marrow bx deferred by heme-onc   Lamictal switched to Vimpat in consultation with neuro as above due to potential contribution to pancytopenia  Trend CBC and diff daily, neutropenic precautions for ANC <500     2. Acute on chronic anemia from UGIB  Patient had significant blood loss from ostomy site 3/20, resulting in hemorraghic shock, improved with blood transfusion; hemostasis achieved after bleeding source identified and sutured. Another large vol danie bloody output from osotomy on 3/21, bleeding source within ostomy site, sutured.   Bleeding esophageal and gastric ulcers s/p clipping, epi, and cautery    Trend CBC, transfuse Transfuse with leukoreduced and irradiated RBCs to maintain Hgb>7     3. Thrombocytopenia  Likely multifactorial including imparied production from marrow dysfunction in setting of persistent inflammation; RI low suggestive of hyperproliferation, hx of B-cell lymphoma, recent infections including persistent COVID, PNA treated, CMV negative. No known history of vWD/congenital disorders. No liver dysfunction; recent hepatic profile unremarkable. HIT workup negative, Hemolytic workup negative. No s/s DIC. No mechanical valves.  Transfuse with leukoreduced and irradiated PLTs if count <20k due to increased risk of bleeding   ONLY transfuse Plt if <20k and actively bleeding  Hold DVT ppx if platelets <50K     4. B cell lymphoma  Follows with heme/onc at [facility]  S/P 6 cycles of chemotherapy in 20222  Maintained on Rituxan for 2 year course PTA, started May 2022, last dose was 12/2024, treatment currently on hold in setting of persistent COVID-19 infection  Anti-Ritux Ab negative     Endo:  Steroid hyperglycemia and diabetes mellitus type 2, A1c at 6.6 from 01/2024  Goal -180  Utilize SSI no insulin gtt     2. R thyroid gland enlargement  Seen on CT 4/5  Follow up US 4/5- Limited exam due to overlying tracheostomy tube and  central line catheter. Heterogeneous lobular appearance to the remaining thyroid gland, nonspecific, question sequela of thyroiditis. No discrete nodule identified.   T4/TSH unremarkable   Will need follow up thyroid US sometime week of 4/15 to follow up     ID:  Fungemia   Initial cx positive 3/31 with candida albicans started on IV high dose fluoconazole. Repeat cxs positive. Found to have intra-abdominal abscess s/p IR drainage with pus and cultures growing candida and likely the source.  Added micafungin d/t persistent positive cultures.   Monitor LFTs  F/up repeat blood cultures  and soft tissue culture  Recurrent bacterial pneumonia with BAL cultures pseudomonas and stenotphomonas.   Recent severe COVID infection, s/p steroids, antivirals      MSK/Skin:  Midline incision wound with poor wound healing  S/p OR 4/4 for midline wound exploration with mucus fistula and end ileostomy, nonviable subq tissue debrided  General surgery following, wound dressing changes per surgery.   2. Critical illness myopathy  Disposition: Critical care    ICU Core Measures     Vented Patient  VAP Bundle  VAP bundle ordered     A: Assess, Prevent, and Manage Pain Has pain been assessed? Yes  Need for changes to pain regimen? No   B: Both Spontaneous Awakening Trials (SATs) and Spontaneous Breathing Trials (SBTs) Plan to perform spontaneous awakening trial today? N/A   Plan to perform spontaneous breathing trial today? N/A   Obvious barriers to extubation? NA   C: Choice of Sedation RASS Goal: N/A patient not on sedation  Need for changes to sedation or analgesia regimen? NA   D: Delirium CAM-ICU: Negative   E: Early Mobility  Plan for early mobility? NA   F: Family Engagement Plan for family engagement today? Yes       Antibiotic Review: Patient on appropriate coverage based on culture data.  and Awaiting culture results.     Review of Invasive Devices:    Ortiz Plan: Continue for accurate I/O monitoring for 48  hours        Prophylaxis:  VTE VTE covered by:  heparin (porcine), Subcutaneous, 5,000 Units at 04/17/24 0610       Stress Ulcer  covered bypantoprazole (PROTONIX) injection 40 mg [[id number]]        Significant 24hr Events     24hr events: overnight after wound dressing changes and analgesia for dressing changes, pt became hypotensive and was given a total of 500 cc fluid bolus, repeat h&H with hb of 6.0 from 7.4 2 units PRBC ordered. GI and surgery aware.      Subjective     Review of Systems     Objective                            Vitals I/O      Most Recent Min/Max in 24hrs   Temp (!) 96.1 °F (35.6 °C) Temp  Min: 96.1 °F (35.6 °C)  Max: 101.1 °F (38.4 °C)   Pulse (!) 112 Pulse  Min: 86  Max: 114   Resp (!) 28 Resp  Min: 10  Max: 37   /56 BP  Min: 76/40  Max: 121/57   O2 Sat 98 % SpO2  Min: 95 %  Max: 100 %      Intake/Output Summary (Last 24 hours) at 4/17/2024 0824  Last data filed at 4/17/2024 0617  Gross per 24 hour   Intake 4165.08 ml   Output 2200 ml   Net 1965.08 ml       Diet Enteral/Parenteral; Tube Feeding No Oral Diet; Nepro; Continuous; 45; Prosource Protein Liquid - One Packet; Refugio Unflavored - One Packet; BID; 300; Water; Every 4 hours    Invasive Monitoring           Physical Exam   Physical Exam  Skin:     General: Skin is warm.   Cardiovascular:      Rate and Rhythm: Normal rate and regular rhythm.   Abdominal:      Comments: Wound bandaging inplace, drain in place draining sediment output with some blood tinged output   Constitutional:       General: She is not in acute distress.     Appearance: She is ill-appearing.   Pulmonary:      Effort: Pulmonary effort is normal. No accessory muscle usage, respiratory distress or accessory muscle usage.      Breath sounds: No wheezing.   Genitourinary/Anorectal:  Ortiz present.          Diagnostic Studies      EKG:   Imaging:  I have personally reviewed pertinent reports.   and I have personally reviewed pertinent films in PACS      Medications:  Scheduled PRN   chlorhexidine, 15 mL, Q12H SARTHAK  fluconazole, 800 mg, Q24H  heparin (porcine), 5,000 Units, Q8H SARTHAK  insulin lispro, 1-6 Units, Q6H SARTHAK  lacosamide, 100 mg, Q12H  lactulose, 20 g, BID  levalbuterol, 1.25 mg, Q6H  metoclopramide, 10 mg, Q6H SARTHAK  micafungin, 150 mg, Q24H  nystatin, , BID  pantoprazole, 40 mg, Q12H SARTHAK  sodium bicarbonate, 650 mg, BID after meals  sodium chloride, 2 spray, BID      acetaminophen, 650 mg, Q6H PRN  albuterol, 2.5 mg, Q4H PRN  ondansetron, 4 mg, Q6H PRN  sodium chloride, 1 Application, Q1H PRN       Continuous    dextrose, 75 mL/hr, Last Rate: Stopped (04/17/24 0226)  norepinephrine, 1-30 mcg/min, Last Rate: Stopped (04/17/24 0641)         Labs:    CBC    Recent Labs     04/15/24  1733 04/16/24  0601 04/16/24 1817 04/17/24  0223   WBC 18.17* 19.45*  --   --    HGB 8.0* 8.0* 7.4* 6.0*   HCT 23.5* 23.7* 21.9* 18.1*   PLT 77* 69*  --   --    BANDSPCT 39* 27*  --   --      BMP    Recent Labs     04/16/24 1817 04/17/24  0101   SODIUM 148* 147   K 3.5 3.3*   * 119*   CO2 13* 13*   AGAP 15* 15*   BUN 81* 79*   CREATININE 1.40* 1.49*   CALCIUM 9.9 9.8       Coags    No recent results     Additional Electrolytes  Recent Labs     04/16/24  0601   MG 2.3   PHOS 6.3*          Blood Gas    No recent results  No recent results LFTs  Recent Labs     04/16/24  0601   ALT 27   AST 15   ALKPHOS 480*   ALB 2.2*   TBILI 1.56*       Infectious  No recent results  Glucose  Recent Labs     04/16/24  0601 04/16/24  1446 04/16/24 1817 04/17/24  0101   GLUC 175* 204* 290* 268*               Susan Prince, DO

## 2024-04-17 NOTE — QUICK NOTE
Contacted by nursing that malecot was inadvertantly pulled during routine turning. Patient's wound was evaluated. Previous dressing and packing removed. Malecot was replaced and attached to marie bag. Wound was repacked with moist kerlix and dressed with heavy drainage packs.

## 2024-04-17 NOTE — PROGRESS NOTES
INTERVAL Progress Note - Critical Care Medicine   Carla Hunt 59 y.o. female MRN: [id number]  Unit/Bed#: MICU 10 Encounter: [id number]          Assessment and Plan:    Assessment  Hemorrhagic shock with increasing pressor requirements, blood product transfusions.  CTA performed due to drop in hemoglobin found to have arterial blush on the posterior of the stomach, GI and IR consulted and awaiting recommendations.  In the meantime I discussed with the patient's  about goals of care.  He is still adamant about full care, he is agreeable to a arterial line and additional central line if needed and any intervention that may be indicated.  Acute blood loss anemia from arterial bleed noted on CTA, checking TEG with platelet mapping  Worsening metabolic acidosis with elevated BUN and worsening creatinine.  Her sodium is improved but her chloride is worsening.  Treating with 1 dose of IV bicarb push    Plan  Neuro: No change in mentation  Resp: Placed on full vent support, FiO2 40  CV: Levophed for MAP greater than 65 through PICC, may need to consider additional access,  consented to additional arterial and central line if needed  ID: Given clinical decompensation, placed back on antibiotics, using meropenem at this time  Heme: TEG with platelet mapping, one-time dose of DDAVP, prepare 4 units of irradiated PRBC, serial H&H  Renal: One-time dose IV bicarb, continue D5 for now at 50 ML  GI/Nutrition: N.p.o.  Endo: Accu-Cheks as needed      Disposition: Continue Critical Care   Code Status: Level 1 - Full Code  ---------------------------------------------------------------------------------------  ICU CORE MEASURES    Prophylaxis   VTE Pharmacologic Prophylaxis: Pharmacologic VTE Prophylaxis contraindicated due to active hemorrhage  VTE Mechanical Prophylaxis: sequential compression device  Stress Ulcer Prophylaxis: Pantoprazole IV     Invasive Devices Review  Invasive Devices       Peripheral  Intravenous Line  Duration             Long-Dwell Peripheral IV (Midline) 24 Right Basilic 4 days    Peripheral IV 24 Left Antecubital <1 day              Drain  Duration             Ileostomy RUQ 57 days    Urethral Catheter Three way 18 Fr. 17 days    Abscess Drain Buttock 6 days              Airway  Duration             Surgical Airway Shiley Cuffed 36 days                  ---------------------------------------------------------------------------------------  SUBJECTIVE    HPI:    Interval History:     ---------------------------------------------------------------------------------------  OBJECTIVE    Vitals   Vitals:    24 1615 24 1630 24 1700 24 1800   BP: (!) 70/42 135/60 100/53 106/61   Pulse: (!) 112 (!) 124 (!) 118 (!) 128   Resp: (!) 33 (!) 32 (!) 36 (!) 29   Temp: 99.3 °F (37.4 °C) 99.3 °F (37.4 °C)     TempSrc:       SpO2: 97% 98% 97% 97%   Weight:       Height:         Temp (24hrs), Av.6 °F (37 °C), Min:96.1 °F (35.6 °C), Max:101.1 °F (38.4 °C)  Current: Temperature: 99.3 °F (37.4 °C)  HR: Pulse: (!) 128 (24 1800)  BP: Blood Pressure: 106/61 (24 1800)  RR: Respirations: (!) 29 (24 1800)  SpO2: SpO2: 97 % (24 1800)  I/O         701   0700  0701   0700    I.V. (mL/kg) 1298.1 (14.8) 330.2 (3.8)    Blood 350 850    NG/GT 1651     IV Piggyback 780.4 150    Feedings 716     Total Intake(mL/kg) 4795.5 (54.5) 1330.2 (15.1)    Urine (mL/kg/hr) 945 (0.4) 375 (0.4)    Drains 30     Stool 1425     Total Output 2400 375    Net +2395.5 +955.2                  Respiratory      Lab Data (Last 4 hours)      None           O2/Vent Data (Last 4 hours)      None                    Physical exam:  Physical Examination: General appearance - chronically ill appearing  Chest - rhonchi noted at the bases  Heart - S1 and S2 normal  Abdomen -distended, bleeding around stoma site  Neurological -not alert oriented not responsive to painful  emboli  Musculoskeletal - no joint tenderness, deformity or swelling  Extremities -pitting edema over upper comities  Skin -mild papular rash with dry skin around the wrist    Laboratory and Diagnostics:  Labs: I have personally reviewed pertinent lab results.    Micro  Results from last 7 days   Lab Units 04/17/24  0611 04/16/24  0601 04/14/24  0521 04/12/24  0521 04/10/24  2032   BLOOD CULTURE   --  No Growth at 24 hrs.  No Growth at 24 hrs. No Growth at 72 hrs.  No Growth at 72 hrs. Candida albicans*  Candida albicans*  --    GRAM STAIN RESULT  Rare Polys  No organisms seen  --   --  Yeast*  Budding Yeast with Pseudomycelia* No polys seen*  1+ Yeast*   BODY FLUID CULTURE, STERILE   --   --   --   --  3+ Growth of Candida albicans*       Imaging: CTA with arterial blush in the posterior stomach  I have personally reviewed pertinent films in PACS    Nutrition       Diet Orders   (From admission, onward)                 Start     Ordered    04/17/24 1021  Diet NPO  Diet effective midnight        References:    Adult Nutrition Support Algorithm    RD Therapeutic Diet Order Protocol   Question Answer Comment   Diet Type NPO    RD to adjust diet per protocol? Yes        04/17/24 1020                     Active Medications  Scheduled Meds:  Current Facility-Administered Medications   Medication Dose Route Frequency Provider Last Rate    acetaminophen  650 mg Oral Q6H PRN Mahamed Cardenas DO      albuterol  2.5 mg Nebulization Q4H PRN Deonte Mills MD      chlorhexidine  15 mL Mouth/Throat Q12H [facility] Mahamed Cardenas DO      dextrose  50 mL/hr Intravenous Continuous Livan Morfin, DO 50 mL/hr (04/17/24 1625)    EPINEPHrine           fluconazole  800 mg Intravenous Q24H Mahamed Cardenas DO Stopped (04/17/24 1400)    insulin lispro  1-6 Units Subcutaneous Q6H [facility] Susan Prince, DO      lacosamide  100 mg Intravenous Q12H Taiwo Preciado, DO      lactulose  20 g Oral BID Clinton Mays MD       levalbuterol  1.25 mg Nebulization Q6H Deonte Mills MD      meropenem  1,000 mg Intravenous Q8H Livan Morfin, DO      metoclopramide  10 mg Intravenous Q6H [facility] Livan Morfin, DO      micafungin  150 mg Intravenous Q24H Torrie Mills MD Stopped (04/17/24 1108)    midazolam           norepinephrine  1-30 mcg/min Intravenous Titrated Jerardo Mir, DO 13 mcg/min (04/17/24 1800)    nystatin   Topical BID Mahamed P Cardenas, DO      ondansetron  4 mg Intravenous Q6H PRN Mahamed P Cardenas, DO      pantoprazole  40 mg Intravenous Q12H [facility] Millicent Medrano MD      sodium bicarbonate  650 mg Oral BID after meals Livan Morfin, DO      sodium chloride  1 Application Nasal Q1H PRN Mahamed P Cardenas, DO      sodium chloride  2 spray Each Nare BID Mahamed P Cardenas, DO       Continuous Infusions:  dextrose, 50 mL/hr, Last Rate: 50 mL/hr (04/17/24 1625)  norepinephrine, 1-30 mcg/min, Last Rate: 13 mcg/min (04/17/24 1800)        Care Time Delivered:   Upon my evaluation, this patient had a high probability of imminent or life-threatening deterioration due to 60, which required my direct attention, intervention, and personal management.  I have personally provided 60 minutes (1730 to 1830) of critical care time, exclusive of procedures, teaching, family meetings, and any prior time recorded by providers other than myself.     Deonte Mills MD  Pulmonary and Critical Care Medicine

## 2024-04-17 NOTE — UTILIZATION REVIEW
Continued Stay Review    Date:   4/17/24                          Current Patient Class:  Inpatient  Current Level of Care:  MICU    HPI:59 y.o. female initially admitted on   1/10/24     Assessment/Plan:   4/17   Hypotensive overnight,  received albumin/isolyte  250 cc bolus.  Given  IV fentanyl about 0100 with surgical dressing change.   Again,  BP in the low  50's and another  bolus  250  isolyte  ordered.  Hmgb  6 and  2 U PRBC  ordered.   Sodium  147, was  151 and  D5W d/c.  On low dose levophed.  Remains on  vent support.  Continue tube feeds   45/hr.  Ileostomy  functioning.  Remains on  SATINDER.  Continue  current meds.      Vital Signs:   Date/Time Temp Pulse Resp BP MAP (mmHg) SpO2 FiO2 (%) O2 Flow Rate (L/min) O2 Device Patient Position - Orthostatic VS   04/17/24 0642 96.1 °F (35.6 °C) Abnormal  112 Abnormal  28 Abnormal  116/56 -- -- -- -- -- --   04/17/24 0445 97.2 °F (36.2 °C) Abnormal  113 Abnormal  22 109/55 76 98 % -- -- -- --   04/17/24 0430 97.2 °F (36.2 °C) Abnormal  113 Abnormal  31 Abnormal  115/60 81 99 % -- -- -- --   04/17/24 0426 97.2 °F (36.2 °C) Abnormal  112 Abnormal  33 Abnormal  107/58 -- -- -- -- -- --   04/17/24 0421 -- -- -- -- -- 98 % -- -- -- --   04/17/24 0415 97.3 °F (36.3 °C) Abnormal  113 Abnormal  37 Abnormal  110/59 82 99 % -- -- -- --   04/17/24 0400 97.3 °F (36.3 °C) Abnormal  114 Abnormal  32 Abnormal  109/56 75 99 % -- -- -- --   04/17/24 0345 97.5 °F (36.4 °C) 114 Abnormal  30 Abnormal  110/56 76 99 % -- -- -- --   04/17/24 0330 97.5 °F (36.4 °C) 112 Abnormal  26 Abnormal  104/57 75 99 % -- -- -- --   04/17/24 0315 97.7 °F (36.5 °C) 109 Abnormal  30 Abnormal  102/56 75 99 % -- -- -- --   04/17/24 0300 97.9 °F (36.6 °C) 107 Abnormal  21 77/41 Abnormal  53 Abnormal  98 % -- -- -- --   04/17/24 0245 98.1 °F (36.7 °C) 107 Abnormal  21 79/43 Abnormal  56 Abnormal  98 % -- -- -- --   04/17/24 0230 98.2 °F (36.8 °C) 106 Abnormal  16 82/43 Abnormal  58 Abnormal  98 % -- -- -- --    04/17/24 0215 98.2 °F (36.8 °C) 105 19 84/47 Abnormal  63 Abnormal  99 % -- -- -- --   04/17/24 0200 98.4 °F (36.9 °C) 107 Abnormal  16 86/50 Abnormal  65 99 % -- -- -- --   04/17/24 0145 98.8 °F (37.1 °C) 106 Abnormal  15 80/45 Abnormal  58 Abnormal  99 % -- -- -- --   04/17/24 0132 -- -- -- -- -- 99 % -- -- -- --   04/17/24 0130 99 °F (37.2 °C) 105 16 83/45 Abnormal  59 Abnormal  99 % -- -- -- --   04/17/24 0115 99.3 °F (37.4 °C) 106 Abnormal  12 82/43 Abnormal  56 Abnormal  99 % -- -- -- --   04/17/24 0100 99.5 °F (37.5 °C) 107 Abnormal  10 Abnormal  79/44 Abnormal  59 Abnormal  98 % -- -- -- --   04/17/24 0045 99.9 °F (37.7 °C) 107 Abnormal  32 Abnormal  85/49 Abnormal  64 Abnormal  100 % -- -- -- --   04/17/24 0030 100 °F (37.8 °C) 109 Abnormal  25 Abnormal  87/51 Abnormal  65 100 % -- -- -- --   04/17/24 0015 100.2 °F (37.9 °C) 107 Abnormal  24 Abnormal  92/53 68 100 % -- -- -- --   04/17/24 0000 100.6 °F (38.1 °C) Abnormal  105 24 Abnormal  88/53 Abnormal  65 99 % -- -- --        Pertinent Labs/Diagnostic Results:       Results from last 7 days   Lab Units 04/17/24  0844 04/17/24  0223 04/16/24  1817 04/16/24  0601 04/15/24  1733 04/15/24  0521   WBC Thousand/uL 17.04*  --   --  19.45* 18.17* 15.66*   HEMOGLOBIN g/dL 8.2* 6.0* 7.4* 8.0* 8.0* 8.1*   HEMATOCRIT % 24.9* 18.1* 21.9* 23.7* 23.5* 23.4*   PLATELETS Thousands/uL 55*  --   --  69* 77* 72*   BANDS PCT %  --   --   --  27* 39* 53*         Results from last 7 days   Lab Units 04/17/24  0844 04/17/24  0101 04/16/24  1817 04/16/24  1446 04/16/24  0601 04/15/24  0521 04/14/24  2157 04/14/24  0521 04/13/24  0554   SODIUM mmol/L 149* 147 148* 151* 150* 149*   < > 153* 150*   POTASSIUM mmol/L 4.2 3.3* 3.5 3.6 3.4* 3.7   < > 3.2* 3.5   CHLORIDE mmol/L 121* 119* 120* 121* 117* 117*   < > 119* 118*   CO2 mmol/L 13* 13* 13* 14* 15* 16*   < > 18* 17*   ANION GAP mmol/L 15* 15* 15* 16* 18* 16*   < > 16* 15*   BUN mg/dL 78* 79* 81* 80* 76* 73*   < > 76* 70*    CREATININE mg/dL 1.48* 1.49* 1.40* 1.46* 1.44* 1.23   < > 1.31* 1.36*   EGFR ml/min/1.73sq m 38 38 41 39 40 48   < > 44 42   CALCIUM mg/dL 10.0 9.8 9.9 10.2 10.1 9.7   < > 10.1 9.9   CALCIUM, IONIZED mmol/L  --   --   --   --   --   --   --  1.39*  --    MAGNESIUM mg/dL 2.2  --   --   --  2.3 1.7*  --  1.9 2.0   PHOSPHORUS mg/dL 5.8*  --   --   --  6.3* 6.0*  --  5.9* 5.1*    < > = values in this interval not displayed.     Results from last 7 days   Lab Units 04/17/24  0844 04/16/24  0601 04/15/24  0521 04/14/24  0521 04/13/24  0554 04/12/24  0521   AST U/L 14 15 15 14 16 22   ALT U/L 23 27 25 25 31 39   ALK PHOS U/L 330* 480* 292* 307* 743* 845*   TOTAL PROTEIN g/dL 3.6* 4.0* 3.7* 3.8* 4.0* 4.5*   ALBUMIN g/dL 2.1* 2.2* 2.1* 2.5* 2.0* 2.5*   TOTAL BILIRUBIN mg/dL 1.36* 1.56* 1.67* 1.48* 1.30* 1.84*   AMMONIA umol/L  --   --   --  69  --  91*     Results from last 7 days   Lab Units 04/17/24  0608 04/17/24  0120 04/16/24  1817 04/16/24  1248 04/15/24  1704 04/15/24  1128 04/15/24  0525 04/15/24  0021 04/14/24  1916 04/14/24  1135 04/14/24  0539 04/13/24  2341   POC GLUCOSE mg/dl 253* 227* 227* 235* 165* 158* 165* 184* 165* 131 129 160*     Results from last 7 days   Lab Units 04/17/24  0844 04/17/24  0101 04/16/24  1817 04/16/24  1446 04/16/24  0601 04/15/24  0521 04/14/24  2157 04/14/24  0521 04/13/24  0554 04/12/24  1656 04/12/24  0521 04/11/24  0502   GLUCOSE RANDOM mg/dL 249* 268* 290* 204* 175* 179* 229* 127 254* 243* 139 132                     Results from last 7 days   Lab Units 04/17/24  0631 04/17/24  0622 04/14/24  0555 04/13/24  1519 04/13/24  1514 04/11/24  0555   UNIT PRODUCT CODE  S9134H91  Z8634D50 Z5172D24  O6323M26  W3828R53  X0074C01  F3547A53  W0168J21 O4594A98  D4851J73 G9055O93  R1856P77 O6955S49  T5104B55 K1400I39   UNIT NUMBER  [id number]  [id number]  [id number]  [id number]-*  [id number]  [id number]  [id number]   [id number]-*  [id number]-*  [id number]   UNITABO  A  A A  O  A  A  A  A A  A A  A A  A A   UNITRH  NEG  NEG NEG  NEG  NEG  NEG  NEG  NEG POS  NEG NEG  NEG NEG  NEG NEG   CROSSMATCH  Compatible  Compatible Compatible  Compatible  Compatible  Compatible  Compatible  Compatible Compatible  --  Compatible  Compatible  --    UNIT DISPENSE STATUS  Presumed Trans  Issued Return to Inv  Presumed Trans  Presumed Trans  Presumed Trans  Return to Inv  Return to Inv Presumed Trans  Presumed Trans Return to Inv  Return to Inv Crossmatched  Crossmatched Presumed Trans   UNIT PRODUCT VOL mL 350  350 350  250  350  350  350  350 234  350 350  350 350  350 241               Results from last 7 days   Lab Units 04/16/24  0601 04/14/24  0521 04/12/24  0521 04/10/24  2032   BLOOD CULTURE  Received in Microbiology Lab. Culture in Progress.  Received in Microbiology Lab. Culture in Progress. No Growth at 48 hrs.  No Growth at 48 hrs. Candida albicans*  Candida albicans*  --    GRAM STAIN RESULT   --   --  Yeast*  Budding Yeast with Pseudomycelia* No polys seen*  1+ Yeast*   BODY FLUID CULTURE, STERILE   --   --   --  3+ Growth of Candida albicans*           Medications:   Scheduled Medications:  chlorhexidine, 15 mL, Mouth/Throat, Q12H SARTHAK  fluconazole, 800 mg, Intravenous, Q24H  heparin (porcine), 5,000 Units, Subcutaneous, Q8H SARTHAK  insulin lispro, 1-6 Units, Subcutaneous, Q6H SARTHAK  lacosamide, 100 mg, Intravenous, Q12H  lactulose, 20 g, Oral, BID  levalbuterol, 1.25 mg, Nebulization, Q6H  metoclopramide, 10 mg, Intravenous, Q6H SARTHAK  micafungin, 150 mg, Intravenous, Q24H  nystatin, , Topical, BID  pantoprazole, 40 mg, Intravenous, Q12H SARTHKA  sodium bicarbonate, 650 mg, Oral, BID after meals  sodium chloride, 2 spray, Each Nare, BID      Continuous IV Infusions:  dextrose, 75 mL/hr, Intravenous, Continuous  norepinephrine, 1-30  mcg/min, Intravenous, Titrated      PRN Meds:  acetaminophen, 650 mg, Oral, Q6H PRN  albuterol, 2.5 mg, Nebulization, Q4H PRN  ondansetron, 4 mg, Intravenous, Q6H PRN  sodium chloride, 1 Application, Nasal, Q1H PRN        Discharge Plan:   TBD    Network Utilization Review Department  ATTENTION: Please call with any questions or concerns to [phone] and carefully listen to the prompts so that you are directed to the right person. All voicemails are confidential.   For Discharge needs, contact Care Management DC Support Team at [phone] opt. 2  Send all requests for admission clinical reviews, approved or denied determinations and any other requests to dedicated fax number below belonging to the campus where the patient is receiving treatment. List of dedicated fax numbers for the Facilities:  FACILITY NAME UR FAX NUMBER   ADMISSION DENIALS (Administrative/Medical Necessity) [phone]   DISCHARGE SUPPORT TEAM (NETWORK) [phone]   PARENT CHILD HEALTH (Maternity/NICU/Pediatrics) [phone]   [facility] [phone]   [facility] [phone]   [facility] [phone]   [facility] [phone]   [facility] [phone]   [facility] [phone]   [facility] [phone]   [facility] [phone]   [facility] [phone]   [facility] [phone]   [facility] [phone]   [facility] [phone]

## 2024-04-17 NOTE — QUICK NOTE
Patient with hypotension overnight with MAP in the low 50s. Originally received Albumin with improvement in MAP to the low 60s. This was followed by Isolyte bolus 250 cc. Around 1 am, Fentanyl 100 mcg IV were given during bedside surgical dressing change. Shortly after, MAP again in the low 50s, originally thought to be secondary to sedation. Another bolus of 250 cc of Isolyte ordered as well as H&H which came back at 6. 2 units of PRBCs ordered.    Additionally, sodium level at 147 from 151 in 6-7 hours. For that reason, D5W infusion was discontinued. Continue FWF for hypernatremia.     Tanika Silveira MD  Pulmonary and Critical Care Fellow, PGY-5  [facility]

## 2024-04-17 NOTE — PROGRESS NOTES
24 1700   Clinical Encounter Type   Visited With Family   Routine Visit Follow-up      Pastoral Care Progress Note    2024  Patient: Carla Hunt : 1965  Admission Date & Time: 1/10/2024 1941  MRN: [id number] CSN: [id number]           visited with family; no needs or concerns. Chaplains remain available.

## 2024-04-18 VITALS
OXYGEN SATURATION: 99 % | RESPIRATION RATE: 6 BRPM | DIASTOLIC BLOOD PRESSURE: 49 MMHG | HEART RATE: 160 BPM | SYSTOLIC BLOOD PRESSURE: 96 MMHG | TEMPERATURE: 99.3 F | WEIGHT: 203.26 LBS | HEIGHT: 68 IN | BODY MASS INDEX: 30.81 KG/M2

## 2024-04-18 LAB
ABO GROUP BLD BPU: NORMAL
ALBUMIN SERPL BCP-MCNC: 2 G/DL (ref 3.5–5)
ALP SERPL-CCNC: 154 U/L (ref 34–104)
ALT SERPL W P-5'-P-CCNC: 22 U/L (ref 7–52)
ANION GAP SERPL CALCULATED.3IONS-SCNC: 16 MMOL/L (ref 4–13)
ANION GAP SERPL CALCULATED.3IONS-SCNC: 16 MMOL/L (ref 4–13)
ANISOCYTOSIS BLD QL SMEAR: PRESENT
ANISOCYTOSIS BLD QL SMEAR: PRESENT
APTT PPP: 48 SECONDS (ref 23–37)
AST SERPL W P-5'-P-CCNC: 19 U/L (ref 13–39)
BASE EXCESS BLDA CALC-SCNC: -10 MMOL/L (ref -2–3)
BASE EXCESS BLDA CALC-SCNC: -11 MMOL/L (ref -2–3)
BASE EXCESS BLDA CALC-SCNC: -11.5 MMOL/L
BASE EXCESS BLDA CALC-SCNC: -12 MMOL/L (ref -2–3)
BASE EXCESS BLDA CALC-SCNC: -13 MMOL/L (ref -2–3)
BASE EXCESS BLDA CALC-SCNC: -13.7 MMOL/L
BASE EXCESS BLDA CALC-SCNC: -19 MMOL/L (ref -2–3)
BASOPHILS # BLD MANUAL: 0 THOUSAND/UL (ref 0–0.1)
BASOPHILS NFR MAR MANUAL: 0 % (ref 0–1)
BILIRUB SERPL-MCNC: 1.96 MG/DL (ref 0.2–1)
BODY TEMPERATURE: 94.1 DEGREES FEHRENHEIT
BODY TEMPERATURE: 96.4 DEGREES FEHRENHEIT
BPU ID: NORMAL
BUN SERPL-MCNC: 68 MG/DL (ref 5–25)
BUN SERPL-MCNC: 70 MG/DL (ref 5–25)
BURR CELLS BLD QL SMEAR: PRESENT
BURR CELLS BLD QL SMEAR: PRESENT
CA-I BLD-SCNC: 1.31 MMOL/L (ref 1.12–1.32)
CA-I BLD-SCNC: 1.31 MMOL/L (ref 1.12–1.32)
CA-I BLD-SCNC: 1.33 MMOL/L (ref 1.12–1.32)
CA-I BLD-SCNC: 1.37 MMOL/L (ref 1.12–1.32)
CA-I BLD-SCNC: 1.45 MMOL/L (ref 1.12–1.32)
CA-I BLD-SCNC: 1.46 MMOL/L (ref 1.12–1.32)
CALCIUM ALBUM COR SERPL-MCNC: 10.4 MG/DL (ref 8.3–10.1)
CALCIUM SERPL-MCNC: 8.8 MG/DL (ref 8.4–10.2)
CALCIUM SERPL-MCNC: 9.5 MG/DL (ref 8.4–10.2)
CFFMA (FUNCTIONAL FIBRINOGEN MAX AMPLITUDE): 27.5 MM (ref 15–32)
CHLORIDE SERPL-SCNC: 118 MMOL/L (ref 96–108)
CHLORIDE SERPL-SCNC: 118 MMOL/L (ref 96–108)
CKLY30: 0 % (ref 0–2.6)
CKR(REACTION TIME): 8.6 MIN (ref 4.6–9.1)
CO2 SERPL-SCNC: 18 MMOL/L (ref 21–32)
CO2 SERPL-SCNC: 18 MMOL/L (ref 21–32)
CREAT SERPL-MCNC: 1.27 MG/DL (ref 0.6–1.3)
CREAT SERPL-MCNC: 1.34 MG/DL (ref 0.6–1.3)
CROSSMATCH: NORMAL
CRTMA(RAPIDTEG MAX AMPLITUDE): 63.7 MM (ref 52–70)
D DIMER PPP FEU-MCNC: 2.68 UG/ML FEU
DEPRECATED AT III PPP: 54 % OF NORMAL (ref 92–136)
EOSINOPHIL # BLD MANUAL: 0 THOUSAND/UL (ref 0–0.4)
EOSINOPHIL NFR BLD MANUAL: 0 % (ref 0–6)
ERYTHROCYTE [DISTWIDTH] IN BLOOD BY AUTOMATED COUNT: 14.7 % (ref 11.6–15.1)
ERYTHROCYTE [DISTWIDTH] IN BLOOD BY AUTOMATED COUNT: 15.2 % (ref 11.6–15.1)
FDP BLD QL AGGL: <10
FIBRINOGEN PPP-MCNC: 264 MG/DL (ref 207–520)
GFR SERPL CREATININE-BSD FRML MDRD: 43 ML/MIN/1.73SQ M
GFR SERPL CREATININE-BSD FRML MDRD: 46 ML/MIN/1.73SQ M
GLUCOSE SERPL-MCNC: 155 MG/DL (ref 65–140)
GLUCOSE SERPL-MCNC: 168 MG/DL (ref 65–140)
GLUCOSE SERPL-MCNC: 170 MG/DL (ref 65–140)
GLUCOSE SERPL-MCNC: 176 MG/DL (ref 65–140)
GLUCOSE SERPL-MCNC: 178 MG/DL (ref 65–140)
GLUCOSE SERPL-MCNC: 178 MG/DL (ref 65–140)
GLUCOSE SERPL-MCNC: 179 MG/DL (ref 65–140)
GLUCOSE SERPL-MCNC: 185 MG/DL (ref 65–140)
GLUCOSE SERPL-MCNC: 194 MG/DL (ref 65–140)
HCO3 BLDA-SCNC: 10.4 MMOL/L (ref 22–28)
HCO3 BLDA-SCNC: 14.8 MMOL/L (ref 22–28)
HCO3 BLDA-SCNC: 15.9 MMOL/L (ref 22–28)
HCO3 BLDA-SCNC: 16.7 MMOL/L (ref 22–28)
HCO3 BLDA-SCNC: 17.1 MMOL/L (ref 22–28)
HCO3 BLDA-SCNC: 17.4 MMOL/L (ref 22–28)
HCO3 BLDA-SCNC: 17.9 MMOL/L (ref 22–28)
HCT VFR BLD AUTO: 24.4 % (ref 34.8–46.1)
HCT VFR BLD AUTO: 30.1 % (ref 34.8–46.1)
HCT VFR BLD AUTO: 30.5 % (ref 34.8–46.1)
HCT VFR BLD AUTO: 33.1 % (ref 34.8–46.1)
HCT VFR BLD CALC: 15 % (ref 34.8–46.1)
HCT VFR BLD CALC: 20 % (ref 34.8–46.1)
HCT VFR BLD CALC: 22 % (ref 34.8–46.1)
HCT VFR BLD CALC: 22 % (ref 34.8–46.1)
HCT VFR BLD CALC: 26 % (ref 34.8–46.1)
HGB BLD-MCNC: 10.4 G/DL (ref 11.5–15.4)
HGB BLD-MCNC: 10.5 G/DL (ref 11.5–15.4)
HGB BLD-MCNC: 11.2 G/DL (ref 11.5–15.4)
HGB BLD-MCNC: 8.3 G/DL (ref 11.5–15.4)
HGB BLDA-MCNC: 5.1 G/DL (ref 11.5–15.4)
HGB BLDA-MCNC: 6.8 G/DL (ref 11.5–15.4)
HGB BLDA-MCNC: 7.5 G/DL (ref 11.5–15.4)
HGB BLDA-MCNC: 7.5 G/DL (ref 11.5–15.4)
HGB BLDA-MCNC: 8.8 G/DL (ref 11.5–15.4)
HOROWITZ INDEX BLDA+IHG-RTO: 40 MM[HG]
HOROWITZ INDEX BLDA+IHG-RTO: 40 MM[HG]
INR PPP: 1.49 (ref 0.84–1.19)
LACTATE SERPL-SCNC: 2.9 MMOL/L (ref 0.5–2)
LACTATE SERPL-SCNC: 3.8 MMOL/L (ref 0.5–2)
LACTATE SERPL-SCNC: 4 MMOL/L (ref 0.5–2)
LACTATE SERPL-SCNC: 4.6 MMOL/L (ref 0.5–2)
LYMPHOCYTES # BLD AUTO: 0.28 THOUSAND/UL (ref 0.6–4.47)
LYMPHOCYTES # BLD AUTO: 2 % (ref 14–44)
LYMPHOCYTES # BLD AUTO: 4 %
MAGNESIUM SERPL-MCNC: 1.9 MG/DL (ref 1.9–2.7)
MCH RBC QN AUTO: 29.6 PG (ref 26.8–34.3)
MCH RBC QN AUTO: 30.1 PG (ref 26.8–34.3)
MCHC RBC AUTO-ENTMCNC: 33.8 G/DL (ref 31.4–37.4)
MCHC RBC AUTO-ENTMCNC: 34.1 G/DL (ref 31.4–37.4)
MCV RBC AUTO: 87 FL (ref 82–98)
MCV RBC AUTO: 88 FL (ref 82–98)
METAMYELOCYTE ABSOLUTE CT: 0.42 THOUSAND/UL (ref 0–0.1)
METAMYELOCYTES NFR BLD MANUAL: 1 % (ref 0–1)
METAMYELOCYTES NFR BLD MANUAL: 3 % (ref 0–1)
MICROCYTES BLD QL AUTO: PRESENT
MICROCYTES BLD QL AUTO: PRESENT
MONOCYTES # BLD AUTO: 0.28 THOUSAND/UL (ref 0–1.22)
MONOCYTES NFR BLD AUTO: 1 % (ref 4–12)
MONOCYTES NFR BLD: 2 % (ref 4–12)
NEUTROPHILS # BLD MANUAL: 12.94 THOUSAND/UL (ref 1.85–7.62)
NEUTS BAND NFR BLD MANUAL: 21 % (ref 0–8)
NEUTS BAND NFR BLD MANUAL: 23 % (ref 0–8)
NEUTS SEG NFR BLD AUTO: 70 % (ref 43–75)
NEUTS SEG NFR BLD AUTO: 73 %
NRBC BLD AUTO-RTO: 26 /100 WBC (ref 0–2)
O2 CT BLDA-SCNC: 14.9 ML/DL (ref 16–23)
O2 CT BLDA-SCNC: 16.4 ML/DL (ref 16–23)
OVALOCYTES BLD QL SMEAR: PRESENT
OXYHGB MFR BLDA: 96.3 % (ref 94–97)
OXYHGB MFR BLDA: 96.6 % (ref 94–97)
PCO2 BLD: 12 MMOL/L (ref 21–32)
PCO2 BLD: 16 MMOL/L (ref 21–32)
PCO2 BLD: 19 MMOL/L (ref 21–32)
PCO2 BLD: 43.8 MM HG (ref 36–44)
PCO2 BLD: 44.6 MM HG (ref 36–44)
PCO2 BLD: 49.3 MM HG (ref 36–44)
PCO2 BLD: 51.6 MM HG (ref 36–44)
PCO2 BLD: 54.9 MM HG (ref 36–44)
PCO2 BLDA: 46.4 MM HG (ref 36–44)
PCO2 BLDA: 54.5 MM HG (ref 36–44)
PCO2 TEMP ADJ BLDA: 44 MM HG (ref 36–44)
PCO2 TEMP ADJ BLDA: 48.9 MM HG (ref 36–44)
PEEP RESPIRATORY: 6 CM[H2O]
PEEP RESPIRATORY: 6 CM[H2O]
PH BLD: 6.98 [PH] (ref 7.35–7.45)
PH BLD: 7.1 [PH] (ref 7.35–7.45)
PH BLD: 7.12 [PH] (ref 7.35–7.45)
PH BLD: 7.14 [PH] (ref 7.35–7.45)
PH BLD: 7.15 [PH] (ref 7.35–7.45)
PH BLD: 7.16 [PH] (ref 7.35–7.45)
PH BLD: 7.19 [PH] (ref 7.35–7.45)
PH BLDA: 7.08 [PH] (ref 7.35–7.45)
PH BLDA: 7.17 [PH] (ref 7.35–7.45)
PHOSPHATE SERPL-MCNC: 6.9 MG/DL (ref 2.7–4.5)
PLATELET # BLD AUTO: 53 THOUSANDS/UL (ref 149–390)
PLATELET # BLD AUTO: 68 THOUSANDS/UL (ref 149–390)
PLATELET # BLD AUTO: 71 THOUSANDS/UL (ref 149–390)
PLATELET BLD QL SMEAR: ABNORMAL
PMV BLD AUTO: 11.2 FL (ref 8.9–12.7)
PMV BLD AUTO: 12 FL (ref 8.9–12.7)
PMV BLD AUTO: 12.2 FL (ref 8.9–12.7)
PO2 BLD: 111.2 MM HG (ref 75–129)
PO2 BLD: 121.2 MM HG (ref 75–129)
PO2 BLD: 124 MM HG (ref 75–129)
PO2 BLD: 132 MM HG (ref 75–129)
PO2 BLD: 230 MM HG (ref 75–129)
PO2 BLD: 238 MM HG (ref 75–129)
PO2 BLD: 286 MM HG (ref 75–129)
PO2 BLDA: 118.4 MM HG (ref 75–129)
PO2 BLDA: 135.7 MM HG (ref 75–129)
POIKILOCYTOSIS BLD QL SMEAR: PRESENT
POIKILOCYTOSIS BLD QL SMEAR: PRESENT
POTASSIUM BLD-SCNC: 3.8 MMOL/L (ref 3.5–5.3)
POTASSIUM BLD-SCNC: 4.1 MMOL/L (ref 3.5–5.3)
POTASSIUM BLD-SCNC: 4.1 MMOL/L (ref 3.5–5.3)
POTASSIUM BLD-SCNC: 4.2 MMOL/L (ref 3.5–5.3)
POTASSIUM BLD-SCNC: 4.4 MMOL/L (ref 3.5–5.3)
POTASSIUM SERPL-SCNC: 4.3 MMOL/L (ref 3.5–5.3)
POTASSIUM SERPL-SCNC: 4.3 MMOL/L (ref 3.5–5.3)
PROT SERPL-MCNC: 3.4 G/DL (ref 6.4–8.4)
PROTHROMBIN TIME: 17.8 SECONDS (ref 11.6–14.5)
RBC # BLD AUTO: 3.45 MILLION/UL (ref 3.81–5.12)
RBC # BLD AUTO: 3.79 MILLION/UL (ref 3.81–5.12)
RBC MORPH BLD: PRESENT
SAO2 % BLD FROM PO2: 100 % (ref 60–85)
SAO2 % BLD FROM PO2: 100 % (ref 60–85)
SAO2 % BLD FROM PO2: 97 % (ref 60–85)
SAO2 % BLD FROM PO2: 98 % (ref 60–85)
SAO2 % BLD FROM PO2: 99 % (ref 60–85)
SODIUM BLD-SCNC: 149 MMOL/L (ref 136–145)
SODIUM BLD-SCNC: 149 MMOL/L (ref 136–145)
SODIUM BLD-SCNC: 150 MMOL/L (ref 136–145)
SODIUM BLD-SCNC: 151 MMOL/L (ref 136–145)
SODIUM BLD-SCNC: 151 MMOL/L (ref 136–145)
SODIUM SERPL-SCNC: 152 MMOL/L (ref 135–147)
SODIUM SERPL-SCNC: 152 MMOL/L (ref 135–147)
SPECIMEN SOURCE: ABNORMAL
TOTAL CELLS COUNTED SPEC: 100
TPA PPP QL CHRO: 48 % OF NORMAL (ref 77–138)
UNIT DISPENSE STATUS: NORMAL
UNIT PRODUCT CODE: NORMAL
UNIT PRODUCT VOLUME: 205 ML
UNIT PRODUCT VOLUME: 280 ML
UNIT PRODUCT VOLUME: 300 ML
UNIT PRODUCT VOLUME: 350 ML
UNIT PRODUCT VOLUME: 500 ML
UNIT RH: NORMAL
VENT AC: 14
VENT AC: 18
VENT- AC: AC
VENT- AC: AC
VT SETTING VENT: 400 ML
VT SETTING VENT: 400 ML
WBC # BLD AUTO: 12.29 THOUSAND/UL (ref 4.31–10.16)
WBC # BLD AUTO: 13.91 THOUSAND/UL (ref 4.31–10.16)

## 2024-04-18 PROCEDURE — 82803 BLOOD GASES ANY COMBINATION: CPT

## 2024-04-18 PROCEDURE — 85362 FIBRIN DEGRADATION PRODUCTS: CPT

## 2024-04-18 PROCEDURE — C9254 INJECTION, LACOSAMIDE: HCPCS | Performed by: STUDENT IN AN ORGANIZED HEALTH CARE EDUCATION/TRAINING PROGRAM

## 2024-04-18 PROCEDURE — 85049 AUTOMATED PLATELET COUNT: CPT

## 2024-04-18 PROCEDURE — 85397 CLOTTING FUNCT ACTIVITY: CPT

## 2024-04-18 PROCEDURE — 82805 BLOOD GASES W/O2 SATURATION: CPT

## 2024-04-18 PROCEDURE — 94664 DEMO&/EVAL PT USE INHALER: CPT

## 2024-04-18 PROCEDURE — 82947 ASSAY GLUCOSE BLOOD QUANT: CPT

## 2024-04-18 PROCEDURE — 94003 VENT MGMT INPAT SUBQ DAY: CPT

## 2024-04-18 PROCEDURE — 85347 COAGULATION TIME ACTIVATED: CPT

## 2024-04-18 PROCEDURE — 83605 ASSAY OF LACTIC ACID: CPT

## 2024-04-18 PROCEDURE — 85007 BL SMEAR W/DIFF WBC COUNT: CPT

## 2024-04-18 PROCEDURE — 99292 CRITICAL CARE ADDL 30 MIN: CPT | Performed by: STUDENT IN AN ORGANIZED HEALTH CARE EDUCATION/TRAINING PROGRAM

## 2024-04-18 PROCEDURE — 85014 HEMATOCRIT: CPT

## 2024-04-18 PROCEDURE — 85018 HEMOGLOBIN: CPT

## 2024-04-18 PROCEDURE — 85576 BLOOD PLATELET AGGREGATION: CPT

## 2024-04-18 PROCEDURE — 99233 SBSQ HOSP IP/OBS HIGH 50: CPT | Performed by: INTERNAL MEDICINE

## 2024-04-18 PROCEDURE — 82330 ASSAY OF CALCIUM: CPT

## 2024-04-18 PROCEDURE — 94760 N-INVAS EAR/PLS OXIMETRY 1: CPT

## 2024-04-18 PROCEDURE — 94640 AIRWAY INHALATION TREATMENT: CPT

## 2024-04-18 PROCEDURE — 85007 BL SMEAR W/DIFF WBC COUNT: CPT | Performed by: STUDENT IN AN ORGANIZED HEALTH CARE EDUCATION/TRAINING PROGRAM

## 2024-04-18 PROCEDURE — C9113 INJ PANTOPRAZOLE SODIUM, VIA: HCPCS | Performed by: EMERGENCY MEDICINE

## 2024-04-18 PROCEDURE — 99291 CRITICAL CARE FIRST HOUR: CPT | Performed by: STUDENT IN AN ORGANIZED HEALTH CARE EDUCATION/TRAINING PROGRAM

## 2024-04-18 PROCEDURE — 84100 ASSAY OF PHOSPHORUS: CPT

## 2024-04-18 PROCEDURE — 85730 THROMBOPLASTIN TIME PARTIAL: CPT

## 2024-04-18 PROCEDURE — 85610 PROTHROMBIN TIME: CPT

## 2024-04-18 PROCEDURE — NC001 PR NO CHARGE: Performed by: STUDENT IN AN ORGANIZED HEALTH CARE EDUCATION/TRAINING PROGRAM

## 2024-04-18 PROCEDURE — 85300 ANTITHROMBIN III ACTIVITY: CPT

## 2024-04-18 PROCEDURE — 84132 ASSAY OF SERUM POTASSIUM: CPT

## 2024-04-18 PROCEDURE — 85379 FIBRIN DEGRADATION QUANT: CPT

## 2024-04-18 PROCEDURE — 82948 REAGENT STRIP/BLOOD GLUCOSE: CPT

## 2024-04-18 PROCEDURE — 83735 ASSAY OF MAGNESIUM: CPT

## 2024-04-18 PROCEDURE — 85027 COMPLETE CBC AUTOMATED: CPT | Performed by: STUDENT IN AN ORGANIZED HEALTH CARE EDUCATION/TRAINING PROGRAM

## 2024-04-18 PROCEDURE — 99024 POSTOP FOLLOW-UP VISIT: CPT | Performed by: SURGERY

## 2024-04-18 PROCEDURE — 80053 COMPREHEN METABOLIC PANEL: CPT | Performed by: STUDENT IN AN ORGANIZED HEALTH CARE EDUCATION/TRAINING PROGRAM

## 2024-04-18 PROCEDURE — 99233 SBSQ HOSP IP/OBS HIGH 50: CPT | Performed by: PHYSICIAN ASSISTANT

## 2024-04-18 PROCEDURE — 85384 FIBRINOGEN ACTIVITY: CPT

## 2024-04-18 PROCEDURE — 85420 FIBRINOLYTIC PLASMINOGEN: CPT

## 2024-04-18 PROCEDURE — 84295 ASSAY OF SERUM SODIUM: CPT

## 2024-04-18 PROCEDURE — P9040 RBC LEUKOREDUCED IRRADIATED: HCPCS

## 2024-04-18 PROCEDURE — 83605 ASSAY OF LACTIC ACID: CPT | Performed by: INTERNAL MEDICINE

## 2024-04-18 RX ORDER — SODIUM CHLORIDE, SODIUM GLUCONATE, SODIUM ACETATE, POTASSIUM CHLORIDE, MAGNESIUM CHLORIDE, SODIUM PHOSPHATE, DIBASIC, AND POTASSIUM PHOSPHATE .53; .5; .37; .037; .03; .012; .00082 G/100ML; G/100ML; G/100ML; G/100ML; G/100ML; G/100ML; G/100ML
500 INJECTION, SOLUTION INTRAVENOUS ONCE
Status: COMPLETED | OUTPATIENT
Start: 2024-04-18 | End: 2024-04-18

## 2024-04-18 RX ORDER — HALOPERIDOL 5 MG/ML
5 INJECTION INTRAMUSCULAR EVERY 2 HOUR PRN
Status: DISCONTINUED | OUTPATIENT
Start: 2024-04-18 | End: 2024-04-18 | Stop reason: HOSPADM

## 2024-04-18 RX ORDER — FENTANYL CITRATE-0.9 % NACL/PF 10 MCG/ML
100 PLASTIC BAG, INJECTION (ML) INTRAVENOUS CONTINUOUS
Status: DISCONTINUED | OUTPATIENT
Start: 2024-04-18 | End: 2024-04-18 | Stop reason: HOSPADM

## 2024-04-18 RX ORDER — FENTANYL CITRATE 50 UG/ML
100 INJECTION, SOLUTION INTRAMUSCULAR; INTRAVENOUS ONCE
Status: COMPLETED | OUTPATIENT
Start: 2024-04-18 | End: 2024-04-18

## 2024-04-18 RX ORDER — TRANEXAMIC ACID 100 MG/ML
1000 INJECTION, SOLUTION INTRAVENOUS ONCE
Status: COMPLETED | OUTPATIENT
Start: 2024-04-18 | End: 2024-04-18

## 2024-04-18 RX ORDER — LORAZEPAM 2 MG/ML
2 INJECTION INTRAMUSCULAR
Status: DISCONTINUED | OUTPATIENT
Start: 2024-04-18 | End: 2024-04-18 | Stop reason: HOSPADM

## 2024-04-18 RX ORDER — FENTANYL CITRATE/PF 50 MCG/ML
SYRINGE (ML) INJECTION
Status: COMPLETED
Start: 2024-04-18 | End: 2024-04-18

## 2024-04-18 RX ORDER — CALCIUM GLUCONATE 20 MG/ML
1 INJECTION, SOLUTION INTRAVENOUS ONCE
Status: COMPLETED | OUTPATIENT
Start: 2024-04-18 | End: 2024-04-18

## 2024-04-18 RX ORDER — FENTANYL CITRATE 50 UG/ML
100 INJECTION, SOLUTION INTRAMUSCULAR; INTRAVENOUS
Status: DISCONTINUED | OUTPATIENT
Start: 2024-04-18 | End: 2024-04-18 | Stop reason: HOSPADM

## 2024-04-18 RX ORDER — GLYCOPYRROLATE 0.2 MG/ML
0.1 INJECTION INTRAMUSCULAR; INTRAVENOUS EVERY 4 HOURS PRN
Status: DISCONTINUED | OUTPATIENT
Start: 2024-04-18 | End: 2024-04-18 | Stop reason: HOSPADM

## 2024-04-18 RX ORDER — OXYMETAZOLINE HYDROCHLORIDE 0.05 G/100ML
2 SPRAY NASAL EVERY 12 HOURS SCHEDULED
Status: DISCONTINUED | OUTPATIENT
Start: 2024-04-18 | End: 2024-04-18

## 2024-04-18 RX ADMIN — FENTANYL CITRATE 100 MCG: 50 INJECTION INTRAMUSCULAR; INTRAVENOUS at 13:18

## 2024-04-18 RX ADMIN — SODIUM BICARBONATE 50 MEQ: 84 INJECTION INTRAVENOUS at 00:37

## 2024-04-18 RX ADMIN — NOREPINEPHRINE BITARTRATE 15 MCG/MIN: 1 INJECTION, SOLUTION, CONCENTRATE INTRAVENOUS at 08:42

## 2024-04-18 RX ADMIN — INSULIN LISPRO 2 UNITS: 100 INJECTION, SOLUTION INTRAVENOUS; SUBCUTANEOUS at 05:45

## 2024-04-18 RX ADMIN — METOCLOPRAMIDE 10 MG: 5 INJECTION, SOLUTION INTRAMUSCULAR; INTRAVENOUS at 00:43

## 2024-04-18 RX ADMIN — SODIUM CHLORIDE, SODIUM GLUCONATE, SODIUM ACETATE, POTASSIUM CHLORIDE, MAGNESIUM CHLORIDE, SODIUM PHOSPHATE, DIBASIC, AND POTASSIUM PHOSPHATE 500 ML: .53; .5; .37; .037; .03; .012; .00082 INJECTION, SOLUTION INTRAVENOUS at 07:00

## 2024-04-18 RX ADMIN — LORAZEPAM 2 MG: 2 INJECTION INTRAMUSCULAR; INTRAVENOUS at 13:18

## 2024-04-18 RX ADMIN — Medication 800 MG: at 08:25

## 2024-04-18 RX ADMIN — NOREPINEPHRINE BITARTRATE 5 MCG/MIN: 1 INJECTION, SOLUTION, CONCENTRATE INTRAVENOUS at 00:14

## 2024-04-18 RX ADMIN — LACOSAMIDE 100 MG: 10 INJECTION, SOLUTION INTRAVENOUS at 08:08

## 2024-04-18 RX ADMIN — TRANEXAMIC ACID 1000 MG: 1 INJECTION, SOLUTION INTRAVENOUS at 03:10

## 2024-04-18 RX ADMIN — SODIUM BICARBONATE 50 MEQ: 84 INJECTION INTRAVENOUS at 08:07

## 2024-04-18 RX ADMIN — MEROPENEM 1000 MG: 1 INJECTION, POWDER, FOR SOLUTION INTRAVENOUS at 00:13

## 2024-04-18 RX ADMIN — Medication 50 MEQ: at 08:07

## 2024-04-18 RX ADMIN — MEROPENEM 1000 MG: 1 INJECTION, POWDER, FOR SOLUTION INTRAVENOUS at 08:06

## 2024-04-18 RX ADMIN — VASOPRESSIN 0.04 UNITS/MIN: 20 INJECTION INTRAVENOUS at 02:46

## 2024-04-18 RX ADMIN — SODIUM BICARBONATE 100 ML/HR: 84 INJECTION, SOLUTION INTRAVENOUS at 08:40

## 2024-04-18 RX ADMIN — SODIUM CHLORIDE, SODIUM GLUCONATE, SODIUM ACETATE, POTASSIUM CHLORIDE, MAGNESIUM CHLORIDE, SODIUM PHOSPHATE, DIBASIC, AND POTASSIUM PHOSPHATE 500 ML: .53; .5; .37; .037; .03; .012; .00082 INJECTION, SOLUTION INTRAVENOUS at 02:40

## 2024-04-18 RX ADMIN — CHLORHEXIDINE GLUCONATE 0.12% ORAL RINSE 15 ML: 1.2 LIQUID ORAL at 08:08

## 2024-04-18 RX ADMIN — GLYCOPYRROLATE 0.1 MG: 0.2 INJECTION, SOLUTION INTRAMUSCULAR; INTRAVENOUS at 13:18

## 2024-04-18 RX ADMIN — FENTANYL CITRATE 100 MCG: 50 INJECTION, SOLUTION INTRAMUSCULAR; INTRAVENOUS at 09:22

## 2024-04-18 RX ADMIN — CALCIUM GLUCONATE 1 G: 20 INJECTION, SOLUTION INTRAVENOUS at 06:30

## 2024-04-18 RX ADMIN — SODIUM BICARBONATE 25 MEQ: 84 INJECTION INTRAVENOUS at 02:45

## 2024-04-18 RX ADMIN — NOREPINEPHRINE BITARTRATE 17 MCG/MIN: 1 INJECTION, SOLUTION, CONCENTRATE INTRAVENOUS at 12:58

## 2024-04-18 RX ADMIN — OXYMETAZOLINE HYDROCHLORIDE 2 SPRAY: 0.05 SPRAY NASAL at 02:35

## 2024-04-18 RX ADMIN — FENTANYL CITRATE 100 MCG: 50 INJECTION INTRAMUSCULAR; INTRAVENOUS at 09:22

## 2024-04-18 RX ADMIN — VASOPRESSIN 0.04 UNITS/MIN: 20 INJECTION INTRAVENOUS at 08:40

## 2024-04-18 RX ADMIN — INSULIN LISPRO 1 UNITS: 100 INJECTION, SOLUTION INTRAVENOUS; SUBCUTANEOUS at 00:00

## 2024-04-18 RX ADMIN — PANTOPRAZOLE SODIUM 40 MG: 40 INJECTION, POWDER, FOR SOLUTION INTRAVENOUS at 08:08

## 2024-04-18 RX ADMIN — Medication 100 MCG/HR: at 09:25

## 2024-04-18 RX ADMIN — LEVALBUTEROL HYDROCHLORIDE 1.25 MG: 1.25 SOLUTION RESPIRATORY (INHALATION) at 01:27

## 2024-04-18 RX ADMIN — LEVALBUTEROL HYDROCHLORIDE 1.25 MG: 1.25 SOLUTION RESPIRATORY (INHALATION) at 08:08

## 2024-04-18 RX ADMIN — NYSTATIN: 100000 POWDER TOPICAL at 08:29

## 2024-04-18 NOTE — PROGRESS NOTES
Progress Note - Infectious Disease   Carla Hunt 59 y.o. female MRN: [id number]  Unit/Bed#: MICU 10 Encounter: [id number]      Impression/Recommendations:  Sepsis, recurrent since admission.    Multifactorial due to COVID-19 infection, recurrent pneumonias, cecal volvulus status post surgery and wound dehiscence.  Most recently due to with persistent candidemia, intraabdominal abscess.  Persistent WBC likely multifactorial, partly leukemoid to ongoing GIB.  Remains critically ill.  Now with evolving shock despite adequate transfusion.  Consider new bacteremia, intraabdominal process.                 -Continue antifungals as below   -Continue meropenem  -Follow temperatures closely  -Recheck CBC diff in AM to monitor infection              -Recheck CMP in AM to monitor renal, liver function on antifungals  -Supportive care as per the primary service  -Continue to address GOC given continued clinical decline     Persistent C. Albicans candidemia.    Initial positive cultures 3/31 are growing Candida albicans, susceptible to fluconazole.  Suspect ileostomy retraction with wound contamination is the initial source.  Now with intraabdominal abscess as above.  MRI brain now shows bilateral infarcts, consideration for possible septic emboli.  Consider endovascular source of infection.  Ophthalmology evaluation negative for endophthalmitis.  TTE x 3 no vegetations but limited views.  SATURNINO unable to be performed as could not pass through the esophagus.  CTA without evidence of mycotic aneurysm.  Repeat HARRY testing shows susceptibility to micafungin.  Blood cultures remain positive                 -Continue IV fluconazole 800mg daily               -Continue micafungin 150mg IV daily              -Follow up repeat blood cultures 4/14  -Given persistent fungemia despite >14 days antifungals, would readdress GOC                Intra-abdominal abscess.    In setting of prior abdominal surgery, ileostomy as below.  Status  post IR drain placement to pelvic collection 4/10 which yielded pus.  Culture shows heavy growth of Candida albicans so likely a source of persistent fungemia.  Improved on repeat CT A/P 4/13.  Status post 14 days IV Zosyn.  Remains on antifungals.                 -Continue IV antifungals, antibiotics              -Follow drain output closely     Large abdominal wall wound  Due to progressive ischemic changes of ileostomy.  Status post washout 4/4.  Now complicated by abscess, candidemia as above.  Status post bedside debridement 4/17.  Tissue culture sent.                 -Continue LWC per surgery              -Follow up tissue cultures although unclear how to interpret as no danie purulence or infection described     Recurrent GI bleed, ulcerations.    In setting of gastritis, esophagitis, esophageal ulceration.  GMS, CMV/HSV stains negative on pathology.  Status post EGD 4/13, 4/14 with clipping, epi.  Recurrent BRB from NGT again 4/17 status post IR embolization                 -Check serial Hgbs              -PRBCs, supportive care per [facility]    Persistent encephalopathy.   Worsened 3/30. MRI brain shows multiple small bilateral foci of acute infarcts as described suggesting embolic etiology.  Status post lumbar puncture 4/10, CSF studies not consistent with meningitis.  Remains significantly impaired.                 -Follow mental status closely off sedation              -Given failure to improve, would readdress GOC     Acute hypoxic respiratory failure.  Status post trach.  Multifactorial, with both COVID and bacterial pneumonia contributing.  Also consider persistent inflammation, fibrosis as seems quite steroid responsive in past.  Was on high dose steroids nearly 3 months, now weaned off.  No active concern for pneumonia at this time.  Weaned to PSV.     Elevated alk phos/T. bili.    May be related to fluconazole although would expect more elevation in AST/ALT.  Right upper quadrant ultrasound no  significant abnormalities.  Spontaneously improving     Recent recurrent bacterial pneumonia.    The patient has completed multiple treatment courses over the hospitalization. Prior BAL cultures with Pseudomonas and stenotrophomonas.  Unclear if pathogens or colonizers.  Repeat CT chest  with improving but persistent groundglass and consolidative opacities.     Recent severe COVID, present on admission.    Status post steroids, antivirals.  Rapid COVID antigen negative 3/25 and 3/27     Recent cecal volvulus  Noted on abdomen/pelvis CT .  Patient is status post exploratory laparotomy with ileocecectomy and end ileostomy creation .        B-cell lymphoma  On maintenance rituxan, which is currently postponed.  There is now a hypovascular mass of the thyroid gland enlarging on serial imaging, concern for lymphoma.  U/S showed no discrete nodule.     I discussed with Dr. Mills the above plan to continue antibioitics, antifungals and continue to address GOC given continued decline.  He agrees with the plan.     Antibiotics:  Fluconazole #16  Micafungin #5  Meropenem #1    Subjective/24 Hour Events:  Patient seen on AM rounds.  Continue GIB requiring embolization by IR.  Epistaxis with nasal packing.  Hypothermic.  Now on 2 pressors.  Meropenem added    Objective:  Vitals:  Temp:  [93.9 °F (34.4 °C)-99.3 °F (37.4 °C)] 99.1 °F (37.3 °C)  HR:  [] 128  Resp:  [9-36] 20  BP: ()/(42-75) 96/49  SpO2:  [96 %-100 %] 100 %  Temp (24hrs), Av.2 °F (36.2 °C), Min:93.9 °F (34.4 °C), Max:99.3 °F (37.4 °C)  Current: Temperature: 99.1 °F (37.3 °C)    Physical Exam:   General:  No acute distress  Psychiatric:  Obtunded  Pulmonary:  Normal respiratory excursion without accessory muscle use  Abdomen:  Dressing in place  Extremities:  No edema  Skin:  No rashes    Lab Results:  I have personally reviewed pertinent labs.  Results from last 7 days   Lab Units 24  0531 24  0428 24  0234 24  4254  04/17/24 2212 04/17/24 2025 04/17/24  1638 04/17/24  1216 04/17/24  0844 04/16/24  1446 04/16/24  0601   SODIUM mmol/L 152*  --   --  152*  --   --  146   < > 149*   < > 150*   POTASSIUM mmol/L 4.3  --   --  4.3  --   --  5.4*   < > 4.2   < > 3.4*   CHLORIDE mmol/L 118*  --   --  118*  --   --  120*   < > 121*   < > 117*   CO2 mmol/L 18*  --   --  18*  --   --  12*   < > 13*   < > 15*   CO2, I-STAT mmol/L  --  19* 19*  --  19*   < >  --   --   --   --   --    BUN mg/dL 68*  --   --  70*  --   --  78*   < > 78*   < > 76*   CREATININE mg/dL 1.27  --   --  1.34*  --   --  1.50*   < > 1.48*   < > 1.44*   EGFR ml/min/1.73sq m 46  --   --  43  --   --  38   < > 38   < > 40   GLUCOSE, ISTAT mg/dl  --  178* 176*  --  170*   < >  --   --   --   --   --    CALCIUM mg/dL 8.8  --   --  9.5  --   --  10.2   < > 10.0   < > 10.1   AST U/L 19  --   --   --   --   --   --   --  14  --  15   ALT U/L 22  --   --   --   --   --   --   --  23  --  27   ALK PHOS U/L 154*  --   --   --   --   --   --   --  330*  --  480*    < > = values in this interval not displayed.     Results from last 7 days   Lab Units 04/18/24  0756 04/18/24  0531 04/18/24  0428 04/18/24  0237 04/18/24  0234 04/17/24  2356 04/17/24  1216 04/17/24  0844   WBC Thousand/uL  --  13.91*  --   --   --  12.29*  --  17.04*   HEMOGLOBIN g/dL 10.5* 11.2*  --  8.3*  --  10.4*   < > 8.2*   I STAT HEMOGLOBIN g/dl  --   --  8.8*  --    < >  --    < >  --    PLATELETS Thousands/uL  --  53*  --  71*  --  68*  --  55*    < > = values in this interval not displayed.     Results from last 7 days   Lab Units 04/17/24  0611 04/16/24  0601 04/14/24  0521 04/12/24  0521   BLOOD CULTURE   --  No Growth at 24 hrs.  No Growth at 24 hrs. No Growth at 72 hrs. Candida albicans*  Candida albicans*   GRAM STAIN RESULT  Rare Polys  No organisms seen  --  Budding yeast* Yeast*  Budding Yeast with Pseudomycelia*       Imaging Studies:   I have personally reviewed pertinent imaging study  reports and images in PACS.  CT A/P images reviewed active extravasation stomach    EKG, Pathology, and Other Studies:   I have personally reviewed pertinent reports.

## 2024-04-18 NOTE — ASSESSMENT & PLAN NOTE
Code Status: DNR- level 3  Decisional apparatus:  Patient is not competent on my exam today.  If competence is lost, patient's substitute decision maker would default to spouse, Regan, by [place] Act 169.  Advance Directive / Living Will / POLST:  none  Continue pressors, other minimally invasive medical optimization until more family arrives thereafter plan to transition to comfort care. Spouse is aware that prognosis is limited even with ongoing efforts.  See ACP documents on 4/8 and 4/11

## 2024-04-18 NOTE — PROGRESS NOTES
"Otolaryngology HN/FPRS Progress Note:    Subjective: Reengaged by the critical care team for epistaxis.  I evaluated the patient around 330 this morning.  There was mild oozing in bilateral nasal cavities and oropharynx. Received 8 units RBC, 1 unit platelet pheresis, and 1 unit whole blood in the last 24 hours.    Objective:   BP (!) 96/49   Pulse (!) 107   Temp (!) 96.1 °F (35.6 °C)   Resp 20   Ht 5' 8\" (1.727 m)   Wt 92.2 kg (203 lb 4.2 oz)   SpO2 99%   BMI 30.91 kg/m²     Physical Exam   Gen: Minimally responsive  Neuro: Minimally responsive  Lungs: on ventilator  CV: Perfusing  Neck: Soft and flat  Abd: Soft NTND    Wound: Mild oozing in bilateral nasal cavities. Bilateral nasal cavities packed with a trimmed 7 cm Merocel. Merocel inflated with Afrin. Patient tolerated procedure well. There were no complications.    Assessment/Plan: 59-year-old woman with mild bilateral epistaxis.  Bilateral nasal cavities were packed with Merocel.  Judging by the severity of epistaxis, I do not believe this is what was causing her hemoglobin to drop.  1.  Nasal packing to remain for 5 days.  2.  While the packing remains in place the patient will require antistaphylococcal prophylaxis.  3.  Nasal saline spray to bilateral nares every 3 hours.  4.  Should the patient bleed again please spray each nasal cavity with 10 sprays of Afrin.    Dispo: per primary    "

## 2024-04-18 NOTE — ANESTHESIA POSTPROCEDURE EVALUATION
Post-Op Assessment Note    CV Status:  Stable  Pain scale: WILL.    Pain management: adequate       Mental Status:  Unresponsive   Hydration Status:  Euvolemic   PONV Controlled:  None   Airway Patency:  Patent  Airway: intubated (Trached)  There is a medical reason for not screening for obstructive sleep apnea and/or for not using two or more mitigation strategies   Post Op Vitals Reviewed: Yes    No anethesia notable event occurred.    Staff: CRNA               BP   124/78   Temp 36.5   Pulse 91   Resp 15   SpO2 98

## 2024-04-18 NOTE — ASSESSMENT & PLAN NOTE
ID following  Visible mold growing from abdominal wound. No further interventions in setting of transition to comfort care.

## 2024-04-18 NOTE — PLAN OF CARE
Problem: Prexisting or High Potential for Compromised Skin Integrity  Goal: Skin integrity is maintained or improved  Description: INTERVENTIONS:  - Identify patients at risk for skin breakdown  - Assess and monitor skin integrity  - Assess and monitor nutrition and hydration status  - Monitor labs   - Assess for incontinence   - Turn and reposition patient  - Assist with mobility/ambulation  - Relieve pressure over bony prominences  - Avoid friction and shearing  - Provide appropriate hygiene as needed including keeping skin clean and dry  - Evaluate need for skin moisturizer/barrier cream  - Collaborate with interdisciplinary team   - Patient/family teaching  - Consider wound care consult   Outcome: Progressing     Problem: Nutrition/Hydration-ADULT  Goal: Nutrient/Hydration intake appropriate for improving, restoring or maintaining nutritional needs  Description: Monitor and assess patient's nutrition/hydration status for malnutrition. Collaborate with interdisciplinary team and initiate plan and interventions as ordered.  Monitor patient's weight and dietary intake as ordered or per policy. Utilize nutrition screening tool and intervene as necessary. Determine patient's food preferences and provide high-protein, high-caloric foods as appropriate.     INTERVENTIONS:  - Monitor oral intake, urinary output, labs, and treatment plans  - Assess nutrition and hydration status and recommend course of action  - Evaluate amount of meals eaten  - Assist patient with eating if necessary   - Allow adequate time for meals  - Recommend/ encourage appropriate diets, oral nutritional supplements, and vitamin/mineral supplements  - Order, calculate, and assess calorie counts as needed  - Recommend, monitor, and adjust tube feedings and TPN/PPN based on assessed needs  - Assess need for intravenous fluids  - Provide specific nutrition/hydration education as appropriate  - Include patient/family/caregiver in decisions related to  nutrition  Outcome: Progressing     Problem: INFECTION - ADULT  Goal: Absence or prevention of progression during hospitalization  Description: INTERVENTIONS:  - Assess and monitor for signs and symptoms of infection  - Monitor lab/diagnostic results  - Monitor all insertion sites, i.e. indwelling lines, tubes, and drains  - Monitor endotracheal if appropriate and nasal secretions for changes in amount and color  - Martin appropriate cooling/warming therapies per order  - Administer medications as ordered  - Instruct and encourage patient and family to use good hand hygiene technique  - Identify and instruct in appropriate isolation precautions for identified infection/condition  Outcome: Progressing     Problem: SAFETY ADULT  Goal: Patient will remain free of falls  Description: INTERVENTIONS:  - Educate patient/family on patient safety including physical limitations  - Instruct patient to call for assistance with activity   - Consult OT/PT to assist with strengthening/mobility   - Keep Call bell within reach  - Keep bed low and locked with side rails adjusted as appropriate  - Keep care items and personal belongings within reach  - Initiate and maintain comfort rounds  - Make Fall Risk Sign visible to staff  - Offer Toileting every 2 Hours, in advance of need  - Initiate/Maintain bed alarm  - Obtain necessary fall risk management equipment: non skid footwear  - Apply yellow socks and bracelet for high fall risk patients  - Consider moving patient to room near nurses station  Outcome: Progressing     Problem: RESPIRATORY - ADULT  Goal: Achieves optimal ventilation and oxygenation  Description: INTERVENTIONS:  - Assess for changes in respiratory status  - Assess for changes in mentation and behavior  - Position to facilitate oxygenation and minimize respiratory effort  - Oxygen administered by appropriate delivery if ordered  - Initiate smoking cessation education as indicated  - Encourage broncho-pulmonary  hygiene including cough, deep breathe, Incentive Spirometry  - Assess the need for suctioning and aspirate as needed  - Assess and instruct to report SOB or any respiratory difficulty  - Respiratory Therapy support as indicated  Outcome: Progressing     Problem: SKIN/TISSUE INTEGRITY - ADULT  Goal: Skin Integrity remains intact(Skin Breakdown Prevention)  Description: Assess:  -Perform Flavio assessment every shift   -Clean and moisturize skin every day   -Inspect skin when repositioning, toileting, and assisting with ADLS  -Assess under medical devices such as ronny  every hour   -Assess extremities for adequate circulation and sensation     Bed Management:  -Have minimal linens on bed & keep smooth, unwrinkled  -Change linens as needed when moist or perspiring  -Avoid sitting or lying in one position for more than 2 hours while in bed  -Keep HOB at 45 degrees     Toileting:  -Offer bedside commode  -Assess for incontinence every hour  -Use incontinent care products after each incontinent episode such as moisture barrier cream     Activity:  -Mobilize patient 3 times a day  -Encourage activity and walks on unit  -Encourage or provide ROM exercises   -Turn and reposition patient every 2 Hours  -Use appropriate equipment to lift or move patient in bed  -Instruct/ Assist with weight shifting every hour when out of bed in chair  -Consider limitation of chair time 2 hour intervals    Skin Care:  -Avoid use of baby powder, tape, friction and shearing, hot water or constrictive clothing  -Relieve pressure over bony prominences using allevyn   -Do not massage red bony areas    Next Steps:  -Teach patient strategies to minimize risks such as weight shifting    -Consider consults to  interdisciplinary teams such as PT  Outcome: Progressing  Goal: Incision(s), wounds(s) or drain site(s) healing without S/S of infection  Description: INTERVENTIONS  - Assess and document dressing, incision, wound bed, drain sites and  surrounding tissue  - Provide patient and family education  - Perform skin care/dressing changes every 12 hr  Outcome: Progressing  Goal: Pressure injury heals and does not worsen  Description: Interventions:  - Implement low air loss mattress or specialty surface (Criteria met)  - Apply silicone foam dressing  - Instruct/assist with weight shifting every 120  minutes when in chair   - Limit chair time to 2 hour intervals  - Use special pressure reducing interventions such as wedges  when in chair   - Apply fecal or urinary incontinence containment device   - Perform passive or active ROM every 4 hr  - Turn and reposition patient & offload bony prominences every 2 hours   - Utilize friction reducing device or surface for transfers   - Consider consults to  interdisciplinary teams such as pt/ot  - Use incontinent care products after each incontinent episode such as incontinence foam  - Consider nutrition services referral as needed  Outcome: Progressing     Problem: NEUROSENSORY - ADULT  Goal: Achieves stable or improved neurological status  Description: INTERVENTIONS  - Monitor and report changes in neurological status  - Monitor vital signs such as temperature, blood pressure, glucose, and any other labs ordered   - Initiate measures to prevent increased intracranial pressure  - Monitor for seizure activity and implement precautions if appropriate      Outcome: Progressing  Goal: Achieves maximal functionality and self care  Description: INTERVENTIONS  - Monitor swallowing and airway patency with patient fatigue and changes in neurological status  - Encourage and assist patient to increase activity and self care.   - Encourage visually impaired, hearing impaired and aphasic patients to use assistive/communication devices  Outcome: Progressing     Problem: CARDIOVASCULAR - ADULT  Goal: Maintains optimal cardiac output and hemodynamic stability  Description: INTERVENTIONS:  - Monitor I/O, vital signs and rhythm  -  Monitor for S/S and trends of decreased cardiac output  - Administer and titrate ordered vasoactive medications to optimize hemodynamic stability  - Assess quality of pulses, skin color and temperature  - Assess for signs of decreased coronary artery perfusion  - Instruct patient to report change in severity of symptoms  Outcome: Progressing  Goal: Absence of cardiac dysrhythmias or at baseline rhythm  Description: INTERVENTIONS:  - Continuous cardiac monitoring, vital signs, obtain 12 lead EKG if ordered  - Administer antiarrhythmic and heart rate control medications as ordered  - Monitor electrolytes and administer replacement therapy as ordered  Outcome: Progressing     Problem: GASTROINTESTINAL - ADULT  Goal: Minimal or absence of nausea and/or vomiting  Description: INTERVENTIONS:  - Administer IV fluids if ordered to ensure adequate hydration  - Maintain NPO status until nausea and vomiting are resolved  - Nasogastric tube if ordered  - Administer ordered antiemetic medications as needed  - Provide nonpharmacologic comfort measures as appropriate  - Advance diet as tolerated, if ordered  - Consider nutrition services referral to assist patient with adequate nutrition and appropriate food choices  Outcome: Progressing  Goal: Maintains or returns to baseline bowel function  Description: INTERVENTIONS:  - Assess bowel function  - Encourage oral fluids to ensure adequate hydration  - Administer IV fluids if ordered to ensure adequate hydration  - Administer ordered medications as needed  - Encourage mobilization and activity  - Consider nutritional services referral to assist patient with adequate nutrition and appropriate food choices  Outcome: Progressing  Goal: Maintains adequate nutritional intake  Description: INTERVENTIONS:  - Monitor percentage of each meal consumed  - Identify factors contributing to decreased intake, treat as appropriate  - Assist with meals as needed  - Monitor I&O, weight, and lab  values if indicated  - Obtain nutrition services referral as needed  Outcome: Progressing  Goal: Establish and maintain optimal ostomy function  Description: INTERVENTIONS:  - Assess bowel function  - Encourage oral fluids to ensure adequate hydration  - Administer IV fluids if ordered to ensure adequate hydration   - Administer ordered medications as needed  - Encourage mobilization and activity  - Nutrition services referral to assist patient with appropriate food choices  - Assess stoma site  - Consider wound care consult   Outcome: Progressing  Goal: Oral mucous membranes remain intact  Description: INTERVENTIONS  - Assess oral mucosa and hygiene practices  - Implement preventative oral hygiene regimen  - Implement oral medicated treatments as ordered  - Initiate Nutrition services referral as needed  Outcome: Progressing     Problem: GENITOURINARY - ADULT  Goal: Maintains or returns to baseline urinary function  Description: INTERVENTIONS:  - Assess urinary function  - Encourage oral fluids to ensure adequate hydration if ordered  - Administer IV fluids as ordered to ensure adequate hydration  - Administer ordered medications as needed  - Offer frequent toileting  - Follow urinary retention protocol if ordered  Outcome: Progressing  Goal: Urinary catheter remains patent  Description: INTERVENTIONS:  - Assess patency of urinary catheter  - If patient has a chronic marie, consider changing catheter if non-functioning  - Follow guidelines for intermittent irrigation of non-functioning urinary catheter  Outcome: Progressing     Problem: METABOLIC, FLUID AND ELECTROLYTES - ADULT  Goal: Electrolytes maintained within normal limits  Description: INTERVENTIONS:  - Monitor labs and assess patient for signs and symptoms of electrolyte imbalances  - Administer electrolyte replacement as ordered  - Monitor response to electrolyte replacements, including repeat lab results as appropriate  - Instruct patient on fluid and  nutrition as appropriate  Outcome: Progressing  Goal: Fluid balance maintained  Description: INTERVENTIONS:  - Monitor labs   - Monitor I/O and WT  - Instruct patient on fluid and nutrition as appropriate  - Assess for signs & symptoms of volume excess or deficit  Outcome: Progressing  Goal: Glucose maintained within target range  Description: INTERVENTIONS:  - Monitor Blood Glucose as ordered  - Assess for signs and symptoms of hyperglycemia and hypoglycemia  - Administer ordered medications to maintain glucose within target range  - Assess nutritional intake and initiate nutrition service referral as needed  Outcome: Progressing     Problem: HEMATOLOGIC - ADULT  Goal: Maintains hematologic stability  Description: INTERVENTIONS  - Assess for signs and symptoms of bleeding or hemorrhage  - Monitor labs  - Administer supportive blood products/factors as ordered and appropriate  Outcome: Progressing     Problem: MUSCULOSKELETAL - ADULT  Goal: Maintain or return mobility to safest level of function  Description: INTERVENTIONS:  - Assess patient's ability to carry out ADLs; assess patient's baseline for ADL function and identify physical deficits which impact ability to perform ADLs (bathing, care of mouth/teeth, toileting, grooming, dressing, etc.)  - Assess/evaluate cause of self-care deficits   - Assess range of motion  - Assess patient's mobility  - Assess patient's need for assistive devices and provide as appropriate  - Encourage maximum independence but intervene and supervise when necessary  - Involve family in performance of ADLs  - Assess for home care needs following discharge   - Consider OT consult to assist with ADL evaluation and planning for discharge  - Provide patient education as appropriate  Outcome: Progressing     Problem: COPING  Goal: Pt/Family able to verbalize concerns and demonstrate effective coping strategies  Description: INTERVENTIONS:  - Assist patient/family to identify coping skills,  available support systems and cultural and spiritual values  - Provide emotional support, including active listening and acknowledgement of concerns of patient and caregivers  - Reduce environmental stimuli, as able  - Provide patient education  - Assess for spiritual pain/suffering and initiate spiritual care, including notification of Pastoral Care or natalia based community as needed  - Assess effectiveness of coping strategies  Outcome: Progressing  Goal: Will report anxiety at manageable levels  Description: INTERVENTIONS:  - Administer medication as ordered  - Teach and encourage coping skills  - Provide emotional support  - Assess patient/family for anxiety and ability to cope  Outcome: Progressing     Problem: BEHAVIOR  Goal: Pt/Family maintain appropriate behavior and adhere to behavioral management agreement, if implemented  Description: INTERVENTIONS:  - Assess the family dynamic   - Encourage verbalization of thoughts and concerns in a socially appropriate manner  - Assess patient/family's coping skills and non-compliant behavior (including use of illegal substances).  - Utilize positive, consistent limit setting strategies supporting safety of patient, staff and others  - Initiate consult with Case Management, Spiritual Care or other ancillary services as appropriate  - If a patient's/visitor's behavior jeopardizes the safety of the patient, staff, or others, refer to organization procedure.   - Notify Security of behavior or suspected illegal substances which indicate the need for search of the patient and/or belongings  - Encourage participation in the decision making process about a behavioral management agreement; implement if patient meets criteria  Outcome: Progressing     Problem: Potential for Falls  Goal: Patient will remain free of falls  Description: INTERVENTIONS:  - Educate patient/family on patient safety including physical limitations  - Instruct patient to call for assistance with activity    - Consult OT/PT to assist with strengthening/mobility   - Keep Call bell within reach  - Keep bed low and locked with side rails adjusted as appropriate  - Keep care items and personal belongings within reach  - Initiate and maintain comfort rounds  - Make Fall Risk Sign visible to staff  - Offer Toileting every 2 Hours, in advance of need  - Initiate/Maintain bed alarm  - Obtain necessary fall risk management equipment: non skid footwear  - Apply yellow socks and bracelet for high fall risk patients  - Consider moving patient to room near nurses station  Outcome: Progressing     Problem: MOBILITY - ADULT  Goal: Maintain or return to baseline ADL function  Description: INTERVENTIONS:  -  Assess patient's ability to carry out ADLs; assess patient's baseline for ADL function and identify physical deficits which impact ability to perform ADLs (bathing, care of mouth/teeth, toileting, grooming, dressing, etc.)  - Assess/evaluate cause of self-care deficits   - Assess range of motion  - Assess patient's mobility; develop plan if impaired  - Assess patient's need for assistive devices and provide as appropriate  - Encourage maximum independence but intervene and supervise when necessary  - Involve family in performance of ADLs  - Assess for home care needs following discharge   - Consider OT consult to assist with ADL evaluation and planning for discharge  - Provide patient education as appropriate  Outcome: Progressing  Goal: Maintains/Returns to pre admission functional level  Description: INTERVENTIONS:  - Perform AM-PAC 6 Click Basic Mobility/ Daily Activity assessment daily.  - Set and communicate daily mobility goal to care team and patient/family/caregiver.   - Collaborate with rehabilitation services on mobility goals if consulted  - Perform Range of Motion 4 times a day.  - Reposition patient every 2 hours.  - Dangle patient 0 times a day  - Stand patient 0 times a day  - Ambulate patient 0 times a day  - Out  of bed to chair 0 times a day   - Out of bed for meals 0 times a day  - Out of bed for toileting  - Record patient progress and toleration of activity level   Outcome: Progressing     Problem: Potential for Falls  Goal: Patient will remain free of falls  Description: INTERVENTIONS:  - Educate patient/family on patient safety including physical limitations  - Instruct patient to call for assistance with activity   - Consult OT/PT to assist with strengthening/mobility   - Keep Call bell within reach  - Keep bed low and locked with side rails adjusted as appropriate  - Keep care items and personal belongings within reach  - Initiate and maintain comfort rounds  - Make Fall Risk Sign visible to staff  - Offer Toileting every 2 Hours, in advance of need  - Initiate/Maintain bed alarm  - Obtain necessary fall risk management equipment: non skid footwear  - Apply yellow socks and bracelet for high fall risk patients  - Consider moving patient to room near nurses station  Outcome: Progressing

## 2024-04-18 NOTE — BRIEF OP NOTE (RAD/CATH)
INTERVENTIONAL RADIOLOGY PROCEDURE NOTE    Date: 4/17/2024    Procedure: Gastric angiogram, embolization of distal branches arising from left gastric artery    Preoperative diagnosis:   1. Acute respiratory failure with hypoxia (HCC)    2. Encephalopathy acute    3. Sepsis (HCC)    4. COVID    5. Pneumonia    6. Viral pneumonia    7. Epistaxis    8. Hypotension, unspecified hypotension type    9. Pneumonia of both lungs due to infectious organism, unspecified part of lung    10. Abnormal CT of the head    11. 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 Abdominal wall cellulitis    14. Open wound of hand except fingers    15. Goals of care, counseling/discussion    16. Tracheostomy present (HCC)    17. Hypotension    18. Stage 3b chronic kidney disease (CKD) (HCC)    19. Anemia    20. Hemorrhagic shock (HCC)    21. Tachycardia    22. Sinus tachycardia    23. Pancytopenia (HCC)    24. Hyperglycemia    25. Urinary retention    26. Vaginal bleeding    27. Hospital-acquired pneumonia    28. Candidiasis    29. Hematemesis    30. Gastric ulcer    31. Uremia    32. Thyroid mass    33. Embolic stroke (HCC)    34. DVT (deep vein thrombosis) in pregnancy    35. DVT (deep venous thrombosis) (HCC)    36. Fungemia    37. Gastrointestinal hemorrhage, unspecified gastrointestinal hemorrhage type         Postoperative diagnosis: Same.    Surgeon: Dwight Schafer MD     Assistant: None. No qualified resident was available.    Blood loss: 20 mL    Specimens: None     Findings: Active extravasation of contrast arising from superior distal branch of the left gastric artery. The feeding vessels were identified and embolized using multiple coils and gelfoam.    Questionable extravasation of contrast present adjacent to previously placed endoscopic clips. Several vessels in this area were embolized using coils.    The right CFA access site was closed using a perclose device.    Complications: None  immediate.    Anesthesia: general    Recommendations:  - Keep right leg extended for 2 hours  - Monitor for bleeding, resuscitation as per ICU.  - If there is concern for continued bleeding, please obtain CTA and contact IR

## 2024-04-18 NOTE — ASSESSMENT & PLAN NOTE
Unfortunate bleeding from stomach, nares, mouth, wound, hematuria. Received total of 12 units of PRBC, FFP, platelets.   Nasal packing in place

## 2024-04-18 NOTE — ASSESSMENT & PLAN NOTE
Underwent IR embolization, EGD yesterday with clots in stomach. Received multiple blood transfusions.

## 2024-04-18 NOTE — ANESTHESIA PREPROCEDURE EVALUATION
Procedure:  IR EMBOLIZATION (SPECIFY VESSEL OR SITE)  IR NON-TUNNELED CENTRAL LINE PLACEMENT    Relevant Problems   CARDIO   (+) Hypertensive disorder      ENDO   (+) Disorder of parathyroid gland (HCC)      GI/HEPATIC   (+) Cecal volvulus (HCC)   (+) GIB (gastrointestinal bleeding)      /RENAL   (+) Acute kidney injury (HCC)   (+) Nephrolithiasis   (+) Stage 3b chronic kidney disease (CKD) (HCC)      HEMATOLOGY   (+) Teto marginal zone B-cell lymphoma (HCC)   (+) Pancytopenia (HCC)      NEURO/PSYCH   (+) Anxiety state   (+) Cerebrovascular accident (CVA) due to embolism (HCC)   (+) Grand mal status (HCC)   (+) Other specified anxiety disorders   (+) Reactive depression   (+) Seizure disorder (HCC)   (+) Subjective visual disturbance      PULMONARY   (+) Acute respiratory failure with hypoxia (HCC)        Physical Exam    Airway       Dental       Cardiovascular      Pulmonary      Other Findings  post-pubertal.      Anesthesia Plan  ASA Score- 4 Emergent    Anesthesia Type- general with ASA Monitors.         Additional Monitors: arterial line and central venous line.    Airway Plan:     Comment: trach.       Plan Factors-    Chart reviewed. EKG reviewed.  Existing labs reviewed. Patient summary reviewed.                  Induction- intravenous.    Postoperative Plan-     Informed Consent- Anesthetic plan and risks discussed with spouse.  I personally reviewed this patient with the CRNA. Discussed and agreed on the Anesthesia Plan with the CRNA..

## 2024-04-18 NOTE — PROGRESS NOTES
Critical Care Interval Progress Note - Post-Op Check     Carla Hunt 59 y.o. female MRN: [id number]  Unit/Bed#: MICU 10 Encounter: [id number]    Subjective and Objective:  Patient evaluated upon returning to the medical ICU after IR gastric angiogram and embolization of distal branches arising from the left gastric artery. Patient received 5 units of PRBC, 5 units of FFP and 1 unit of platelets during procedure. She is currently on Levophed at 5 mcg/min. Right arterial line and right IJ central line placed by anesthesia and IR. Per report, patient was acidotic with a pH of 6.9 during procedure, however labs are not currently available to review. Patient is currently on ACVC 14/400/6/40%.    Physical Exam  Vitals reviewed.   Skin:     General: Skin is warm.   Cardiovascular:      Rate and Rhythm: Regular rhythm. Tachycardia present.   Musculoskeletal:      Right lower leg: Edema present.      Left lower leg: Edema present.   Abdominal:      Palpations: Abdomen is soft.      Comments: Wound bandaging inplace, drain in place draining sediment output with some blood tinged output   Constitutional:       General: She is not in acute distress.     Appearance: She is ill-appearing.      Interventions: She is sedated and intubated.   Pulmonary:      Effort: No accessory muscle usage, respiratory distress or accessory muscle usage. She is intubated.      Breath sounds: No wheezing.   Neurological:      Comments: WILL. Patient sedated and intubated.   Genitourinary/Anorectal:  Ortiz present.       Assessment and Plan:  Principal Problem:    Acute respiratory failure with hypoxia (HCC)  Active Problems:    Anxiety state    Encephalopathy    COVID    Stage 3b chronic kidney disease (CKD) (HCC)    Teto marginal zone B-cell lymphoma (HCC)    Seizure disorder (HCC)    Hypotension    Palliative care patient    Goals of care, counseling/discussion    Acute kidney injury (HCC)    Cecal volvulus (HCC)    Drowsiness    Urinary  "retention    Fungemia    Pancytopenia (HCC)    Gross hematuria    Cerebrovascular accident (CVA) due to embolism (HCC)    GIB (gastrointestinal bleeding)  Resolved Problems:    Stroke-like symptoms    Encephalopathy acute    Dysphagia    Pneumonia of both lungs due to infectious organism      Neuro: Patient received Ketamine gtt and Versed during IR procedure. Also Rocuronium which was reversed with Sugamadex prior to arriving in the MICU. Monitor off sedation for now.    CV: Hemorrhagic shock S/P IR embolization of distal branches of the left gastric artery. Currently on Levophed at 5 mcg/min. Wean pressors as tolerated for goal MAP > 65. CBC ordered.  Pulm: acute on chronic hypoxic respiratory failure. Patient was on ASV during the day. Transitioned to ACVC now while sedation wears off. Acidotic during procedure. Check ABG now.  GI: Continue protonix 40 mg IV BID. S/P IR embolization of distal branches of the left gastric artery.  : Continue D5W. Monitor BMP. Replete electrolytes as needed.  Heme: Hemorrhagic shock. S/P 5 units PRBC, 5 units FFP, and 1 unit of platelets during IR procedure. Monitor CBC and transfuse for Hg < 7 and/or platelets < 50.   ID: Continue Micafungin and Fluconazole  Endo: Accu-checks as needed    Patient's  updated via phone call. All questions answered.      Tanika Silveira MD  Pulmonary and Critical Care Fellow, PGY-5  [facility]      Portions of the record may have been created with voice recognition software.  Occasional wrong word or \"sound a like\" substitutions may have occurred due to the inherent limitations of voice recognition software.  Read the chart carefully and recognize, using context, where substitutions have occurred   "

## 2024-04-18 NOTE — PROGRESS NOTES
[facility] - Inpatient Progress Note    PATIENT INFORMATION      Carla Hunt 59 y.o. female MRN: [id number]  Unit/Bed#: MICU 10 Encounter: [id number]    ASSESSMENT & PLAN   Carla Hunt is a 59-year-old female with history including but not limited to B-cell lymphoma involving bone marrow s/p chemo previously on Rituxan, seizure disorder s/p partial left upper lobe resection, CKD, DM with current prolonged and complicated hospital course after being admitted for encephalopathy, acute hypoxic respiratory failure in setting of COVID infection now s/p trach placement. 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 Hospitalization has also been complicated by candidemia, pelvic abscesses s/p IR drain placement, DVT, embolic strokes. GI re-consulted for persistent UGI bleeding s/p EGD x3 and ultimately IR embolization on 4/17.     UGIB 2/2 Esophageal and Gastric Ulcerations  Acute Blood Loss Anemia  Hx Cecal Volvulus with Ileocecectomy  Patient now s/p EGD 4/13, 4/14, 4/17 for UGI bleeding with prior EGD during hospitalization for same. Noted to have esophageal and gastric ulcers. Treated esophageal ulceration on 4/13 and oozing gastric ulcer on 4/14 with clips/epi and bipolar also used on gastric ulcer. Multiple additional ulcers visualized without active bleeding and were not treated. OVESCO attempted but could not be passed due to UES stricture. Repeat EGD on 4/17 with significant amount of clot, no active bleeding seen, with plans for repeat scope on 4/18. Evening of 4/17 patient became unstable with CTA demonstrating active extravasation within the posterior wall of the stomach and ultimately taken to IR for angio and embolization with multiple coils/gelfoam placed in left gastric artery with possible extravasation of contrast adjacent to previously placed endoscopic clips with feeding vessels  embolized. Patient initially stabilized, but has now had increasing pressor requirements.   Unfortunately, from endoscopic perspective no additional treatment can be offered at this time with multiple failed attempts at hemostasis  Agree with ongoing overall goals of care discussion as if patient continues to bleed from GI tract with failed endoscopic intervention and interventional radiology intervention, only additional option would be surgery for which patient is likely not a candidate  Continue PPI while discussions are being had - only additional option would be PPI drip in setting of acute illness with refractory GI bleeding  Monitor hemoglobin per critical care  Monitor stool output  GI to remain available for any additional questions or concerns, however, would not pursue repeat endoscopic intervention at this time.    SUBJECTIVE     Patient seen and evaluated at bedside. Nonresponsive off sedation. Does not follow. No subjective input obtainable    MEDICATIONS & ALLERGIES       Medications:   Medications Prior to Admission   Medication    busPIRone (BUSPAR) 5 mg tablet    escitalopram (LEXAPRO) 10 mg tablet    ibuprofen (MOTRIN) 600 mg tablet    lamoTRIgine (LaMICtal) 200 MG tablet    lamoTRIgine (LaMICtal) 200 MG tablet    ondansetron (ZOFRAN-ODT) 4 mg disintegrating tablet    topiramate (TOPAMAX) 100 mg tablet    topiramate (TOPAMAX) 100 mg tablet    venlafaxine (EFFEXOR-XR) 75 mg 24 hr capsule     Current Facility-Administered Medications   Medication Dose Route Frequency    acetaminophen (TYLENOL) tablet 650 mg  650 mg Oral Q6H PRN    albuterol inhalation solution 2.5 mg  2.5 mg Nebulization Q4H PRN    chlorhexidine (PERIDEX) 0.12 % oral rinse 15 mL  15 mL Mouth/Throat Q12H SARTHAK    fentaNYL (SUBLIMAZE) 0.05 mg/mL injection **ADS Override Pull**        fentaNYL 1000 mcg in sodium chloride 0.9% 100mL infusion  100 mcg/hr Intravenous Continuous    fluconazole (DIFLUCAN) (HIGH DOSE) IVPB 800 mg  800 mg  "Intravenous Q24H    hydrocortisone (Solu-CORTEF) injection 100 mg  100 mg Intravenous Once    hydrocortisone (Solu-CORTEF) injection 50 mg  50 mg Intravenous Q6H SARTHAK    insulin lispro (HumALOG/ADMELOG) 100 units/mL subcutaneous injection 1-6 Units  1-6 Units Subcutaneous Q6H SARTHAK    lacosamide (VIMPAT) injection 100 mg  100 mg Intravenous Q12H    lactulose (CHRONULAC) oral solution 20 g  20 g Oral BID    levalbuterol (XOPENEX) inhalation solution 1.25 mg  1.25 mg Nebulization Q6H    meropenem (MERREM) 1,000 mg in sodium chloride 0.9 % 100 mL IVPB  1,000 mg Intravenous Q8H    micafungin (MYCAMINE) 150 mg in sodium chloride 0.9 % 100 mL IVPB  150 mg Intravenous Q24H    norepinephrine (LEVOPHED) 8 mg (DOUBLE CONCENTRATION) IV in sodium chloride 0.9% 250 mL  1-30 mcg/min Intravenous Titrated    nystatin (MYCOSTATIN) powder   Topical BID    ondansetron (ZOFRAN) injection 4 mg  4 mg Intravenous Q6H PRN    oxymetazoline (AFRIN) 0.05 % nasal spray 2 spray  2 spray Each Nare Q12H SARTHAK    pantoprazole (PROTONIX) injection 40 mg  40 mg Intravenous Q12H [facility]    sodium bicarbonate 150 mEq in dextrose 5 % 1,000 mL infusion  100 mL/hr Intravenous Continuous    sodium bicarbonate tablet 650 mg  650 mg Oral BID after meals    sodium chloride (AYR SALINE NASAL) nasal gel 1 Application  1 Application Nasal Q1H PRN    sodium chloride (OCEAN) 0.65 % nasal spray 2 spray  2 spray Each Nare BID    vasopressin (PITRESSIN) 20 Units in sodium chloride 0.9 % 100 mL infusion  0.04 Units/min Intravenous Continuous       Allergies:   No Known Allergies    PHYSICAL EXAM     Objective   Blood pressure (!) 96/49, pulse (!) 124, temperature 98.4 °F (36.9 °C), resp. rate (!) 24, height 5' 8\" (1.727 m), weight 92.2 kg (203 lb 4.2 oz), SpO2 99%. Body mass index is 30.91 kg/m².    Intake/Output Summary (Last 24 hours) at 4/18/2024 0913  Last data filed at 4/18/2024 0600  Gross per 24 hour   Intake 5526.03 ml   Output 1055 ml   Net 4471.03 ml       General " Appearance:   Critically ill-appearing, not responsive to verbal/tactile stimuli   HEENT:   Normocephalic, nasal packing in place with dried blood    Neck:   Tracheostomy in place   Lungs:   Mechanically ventilated   Heart:   Tachycardia   Abdomen:   Soft, distended; hypoactive bowel sounds; midline abdominal wound dressed   Rectal:   Deferred    Extremities:   Diffuse edema   Neuro:   Nonresponsive   Skin:   Ecchymosis      ADDITIONAL DATA     Lab Results:     Results from last 7 days   Lab Units 04/18/24  0756 04/18/24  0531   WBC Thousand/uL  --  13.91*   HEMOGLOBIN g/dL 10.5* 11.2*   HEMATOCRIT % 30.1* 33.1*   PLATELETS Thousands/uL  --  53*   LYMPHO PCT %  --  2*   MONO PCT %  --  2*   EOS PCT %  --  0     Results from last 7 days   Lab Units 04/18/24  0531 04/18/24  0428   POTASSIUM mmol/L 4.3  --    CHLORIDE mmol/L 118*  --    CO2 mmol/L 18*  --    CO2, I-STAT mmol/L  --  19*   BUN mg/dL 68*  --    CREATININE mg/dL 1.27  --    CALCIUM mg/dL 8.8  --    ALK PHOS U/L 154*  --    ALT U/L 22  --    AST U/L 19  --    GLUCOSE, ISTAT mg/dl  --  178*     Results from last 7 days   Lab Units 04/18/24  0237   INR  1.49*       Imaging:    CT high volume bleeding scan abdomen pelvis    Result Date: 4/18/2024  Narrative: CT ABDOMEN AND PELVIS - WITHOUT AND WITH IV CONTRAST INDICATION:   r/o recurrent bleed, f/up abdominal wound, r/o other source of infection. COMPARISON: CT of the abdomen and pelvis on April 13, 2024. TECHNIQUE: CT examination of the abdomen and pelvis was performed both prior to and after the administration of intravenous contrast. Multiplanar 2D reformatted images were created from the source data. Radiation dose length product (DLP) for this visit: 2526.47 mGy-cm . This examination, like all CT scans performed in the [facility], was performed utilizing techniques to minimize radiation dose exposure, including the use of iterative reconstruction and automated exposure control. IV  Contrast: 100 mL of iohexol (OMNIPAQUE) Enteric Contrast: Not administered. FINDINGS: ABDOMEN BOWEL: Persistent heterogeneous material within the stomach likely reflecting blood products/hematoma. Clips are seen within the stomach. End ileostomy with drain in place. There is active extravasation along the posterior wall of the stomach (series 3, image 235). Mild diffuse thickening of the small and large bowel may be reactive. Colonic diverticulosis without evidence of diverticulitis. Age-appropriate atherosclerotic disease in the mesenteric vessels, without complete occlusion. Infrarenal IVC filter in place. LOWER CHEST: Bibasilar opacities mildly increased since the prior examination, correlate for pneumonia. LIVER/BILIARY TREE: Unremarkable. GALLBLADDER: Collapsed. Mild pericholecystic fluid similar to prior exam. SPLEEN: Unremarkable. PANCREAS: Unremarkable. ADRENAL GLANDS: Unremarkable. KIDNEYS/URETERS: Redemonstrated multiple cysts and calcifications similar to prior examination. ABDOMINOPELVIC CAVITY: Mild ascites. Loculated fluid is seen within the right lower quadrant subjacent to the abdominal wound measuring up to 11.7 x 1.7 x 8.8 cm (series 3, image 113 and series 604, image 30) with thin rim enhancement. No pneumoperitoneum. No lymphadenopathy. Pelvic drain in place. VESSELS: Mild atherosclerotic calcifications. PELVIS REPRODUCTIVE ORGANS: Unremarkable for patient's age. URINARY BLADDER: Ortiz catheter within a collapsed bladder. ABDOMINAL WALL/INGUINAL REGIONS: Redemonstrated large ventral abdominal wall defect with packing material. Small amount of subcutaneous hematoma is seen within the right lower quadrant (series 3, image 121) similar to prior examination. Trace subcutaneous emphysema. Moderate anasarca. BONES: No acute fracture or suspicious osseous lesion.     Impression: 1.  Redemonstrated active extravasation within the posterior wall of the stomach. Patient is status post subsequent IR  embolization in this region. 2.  Bibasilar opacities mildly increased since the prior examination, correlate for pneumonia. 3.  Mildly loculated ascites in the right lower quadrant (series 604, image 30) with thin rim of enhancement, infection is not excluded. 4.  Other findings as above. Workstation performed: [id number]     IR embolization (specify vessel or site)    Result Date: 4/17/2024  Narrative: Please refer to the Operative Note for procedure dictation.    EGD    Result Date: 4/17/2024  Narrative: Table formatting from the original result was not included. [facility] [address] [facility] [id number] [phone] DATE OF SERVICE: 4/17/24 PHYSICIAN(S): Attending: No Staff Documented Fellow: No Staff Documented INDICATION: Gastrointestinal hemorrhage, unspecified gastrointestinal hemorrhage type POST-OP DIAGNOSIS: See the impression below. PREPROCEDURE: Informed consent was obtained for the procedure, including sedation.  Risks of perforation, hemorrhage, adverse drug reaction and aspiration were discussed. The patient was placed in the left lateral decubitus position. Patient was explained about the risks and benefits of the procedure. Risks including but not limited to bleeding, infection, and perforation were explained in detail. Also explained about less than 100% sensitivity with the exam and other alternatives. PROCEDURE: EGD DETAILS OF PROCEDURE: Patient was taken to the procedure room where a time out was performed to confirm correct patient and correct procedure. The patient underwent monitored anesthesia care, which was administered by an anesthesia professional. The patient's blood pressure, heart rate, level of consciousness, respirations, oxygen, ECG and ETCO2 were monitored throughout the procedure. The scope was introduced through the mouth and advanced to the second part of the duodenum. Retroflexion was performed in the fundus. The patient experienced no blood  loss. The procedure was not difficult. The patient tolerated the procedure well. There were no apparent adverse events. ANESTHESIA INFORMATION: ASA: ASA status cannot be found on the log. Anesthesia Type: Anesthesia type cannot be found on the log. MEDICATIONS: Totals unavailable because the procedure time range is not set FINDINGS: One foreign body in the middle third of the esophagus, removed with previously placed clip noted. Multiple small, superficial ulcers in the body of the stomach with clean base (Zaki III) Significant amount of blood clotting in the stomach, which obscured visualization. Attempted to clear the blood with net, snare, suction but unable to achieve adequate clearance The duodenum appeared normal. SPECIMENS: * Cannot find log *     Impression: One foreign body in the middle third of the esophagus, removed with previously placed clip noted. Multiple small, superficial ulcers in the body of the stomach with clean base (Zaki III) Significant amount of blood clotting in the stomach, which obscured visualization. Attempted to clear the blood with net, snare, suction but unable to achieve adequate clearance The duodenum appeared normal. RECOMMENDATION:  Schedule repeat EGD  Incomplete procedure   Recommend Reglan every 6 hours as well as erythromycin in between Reglan doses to help facilitate clearance of the stomach.  Trend CBC and transfuse to maintain hemoglobin greater than 7 and platelets greater than 50.  Maintain PPI. Plan for repeat EGD tomorrow    No Staff Documented     US thyroid    Result Date: 4/16/2024  Narrative: THYROID ULTRASOUND INDICATION: f/up thyroidmegaly. COMPARISON: 4/5/2024 TECHNIQUE: Ultrasound of the thyroid was performed with a high frequency linear transducer in transverse and sagittal planes including volumetric imaging sweeps as well as traditional still imaging technique. FINDINGS: The study is limited due to patient positioning and tracheostomy. Thyroid  texture: Thyroid parenchyma is diffusely heterogeneous in echotexture. Right lobe: 4.9 x 3.0 x 2.9 cm. Volume 20.0 mL Left lobe: Resected. No discrete thyroid nodule is seen.     Impression: Limited examination. Heterogeneous appearance of the right thyroid lobe without discrete dominant nodule identified. Reference: ACR Thyroid Imaging, Reporting and Data System (TI-RADS): White Paper of the ACR TI-RADS Committee. J AM Maria G Radiol 2017;14:587-595. Additional recommendations based on American Thyroid Association 2015 guidelines. Workstation performed: [id number]     Echo follow up/limited w/ contrast if indicated    Result Date: 4/15/2024  Narrative:   Left Ventricle: Left ventricular cavity size is normal. Wall thickness is normal. The left ventricular ejection fraction is 65%. Systolic function is vigorous. Although no diagnostic regional wall motion abnormality was identified, this possibility cannot be completely excluded on the basis of this study. Unable to assess diastolic function.   Right Ventricle: Right ventricular cavity size is normal. Systolic function is normal.   Aortic Valve: There is mild regurgitation with a centrally directed jet. There is aortic valve sclerosis.   Tricuspid Valve: There is mild regurgitation.   No obvious valvular vegetations seen.   Study quality was poor. A limited 2D echo was performed.     EGD    Result Date: 4/15/2024  Narrative: Table formatting from the original result was not included. [facility] [address] [facility] [address] [phone] DATE OF SERVICE: 4/13/24 PHYSICIAN(S): Attending: Alireza Lea MD Fellow: Landon Calle MD INDICATION: Hemorrhagic shock (HCC) POST-OP DIAGNOSIS: See the impression below. PREPROCEDURE: Informed consent was obtained for the procedure, including sedation.  Risks of perforation, hemorrhage, adverse drug reaction and aspiration were discussed. The patient was placed in the left lateral decubitus position.  Patient was explained about the risks and benefits of the procedure. Risks including but not limited to bleeding, infection, and perforation were explained in detail. Also explained about less than 100% sensitivity with the exam and other alternatives. PROCEDURE: EGD DETAILS OF PROCEDURE: Patient was taken to the procedure room where a time out was performed to confirm correct patient and correct procedure. The patient underwent monitored anesthesia care, which was administered by an anesthesia professional. The patient's blood pressure, heart rate, level of consciousness, respirations, oxygen, ECG and ETCO2 were monitored throughout the procedure. The scope was introduced through the mouth and advanced to the second part of the duodenum. Retroflexion was performed in the fundus. The patient experienced no blood loss. The procedure was not difficult. The patient tolerated the procedure well. There were no apparent adverse events. ANESTHESIA INFORMATION: ASA: ASA status not filed in the log. Anesthesia Type: Anesthesia type not filed in the log. MEDICATIONS: Totals unavailable because the procedure time range is not set FINDINGS: Benign-appearing stricture (traversable) in the cricopharynx Single small, superficial, irregular ulcer in the middle third of the esophagus (24 cm from the incisors) with oozing hemorrhage (Zaki IB); placed 2 clips successfully (clips are MRI incompatible and MRI conditional); hemostasis achieved; injected 3 mL of epinephrine to address bleeding; hemostasis achieved Severe, generalized grade D esophagitis with multiple mucosal breaks measuring 5 mm or more, continuous between folds, covering 75% or more of the circumference, showing erythematous, friable and ulcerated mucosa in the middle third of the esophagus and lower third of the esophagus Significant amount of blood clotting in the stomach Severe, generalized edematous and ulcerated mucosa with erosion in the stomach, consistent  with gastritis The 1st part of the duodenum and 2nd part of the duodenum appeared normal. SPECIMENS: * No specimens in log *     Impression: Single ulcer in mid-esophagus with active oozing (Zaki 1B); hemostasis achieved with placement of 2 clips and epinephrine infiltration. Severe ulcerations and esophagitis. Significant blood clotting in the stomach obscuring visualization despite aggressive irrigation, suction and patient repositioning. Visualized areas showed multiple areas of linear ulcerations with clean base. Posterior wall of the stomach could not be adequately visualized. Normal duodenum. 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 RECOMMENDATION:  Schedule follow-up procedure for continued treatment Avoid NG tube placement for 24-48 hours. Will consider endoscopic NG tube placement at the time. IV PPI BID till in the hospital. IV Reglan Q6H. Consider second look endoscopy depending on the clinical course. NPO till further evaluation. Recommendations discussed with ICU team at bedside.  Alireza Lea MD     XR chest portable ICU    Result Date: 4/15/2024  Narrative: XR CHEST PORTABLE ICU INDICATION: NGT placement. COMPARISON: 04/08/2024 FINDINGS: Clear lungs. No pneumothorax or pleural effusion. Midline tracheostomy. Enteric tube extends into the stomach. Normal cardiomediastinal silhouette. Bones are unremarkable for age. Normal upper abdomen.     Impression: No acute cardiopulmonary disease. Enteric tube extends into the stomach. Workstation performed: [id number]     EGD    Result Date: 4/14/2024  Narrative: Table formatting from the original result was not included. [facility] End[facility] [address] [facility] [id number] [phone] DATE OF SERVICE: 4/14/24 PHYSICIAN(S): Attending: No Staff Documented Fellow: No Staff Documented INDICATION: Hematemesis, Hemorrhagic shock (HCC), Gastric ulcer POST-OP DIAGNOSIS: See the impression below. PREPROCEDURE: Informed consent was obtained for the  procedure, including sedation.  Risks of perforation, hemorrhage, adverse drug reaction and aspiration were discussed. The patient was placed in the left lateral decubitus position. Patient was explained about the risks and benefits of the procedure. Risks including but not limited to bleeding, infection, and perforation were explained in detail. Also explained about less than 100% sensitivity with the exam and other alternatives. PROCEDURE: EGD DETAILS OF PROCEDURE: Patient was taken to the procedure room where a time out was performed to confirm correct patient and correct procedure. The patient was already under sedation prior to the procedure. The patient's blood pressure, heart rate, level of consciousness, respirations, oxygen, ECG and ETCO2 were monitored throughout the procedure. The scope was introduced through the mouth and advanced to the second part of the duodenum. Retroflexion was performed in the fundus. The patient experienced no blood loss. The procedure was not difficult. The patient tolerated the procedure well. There were no apparent adverse events. ANESTHESIA INFORMATION: ASA: ASA status cannot be found on the log. Anesthesia Type: Anesthesia type cannot be found on the log. MEDICATIONS: Totals unavailable because the procedure time range is not set FINDINGS: Prior treated esophageal seen again with prior placed clips. No active bleeding. Significant amount of blood clotting in the stomach, which obscured visualization. Removed with suction, snare and retrieval basket. Single small, superficial, irregular ulcer in the stomach with spurting hemorrhage (Zaki IA); placed 4 clips successfully (clips are MRI conditional); hemostasis achieved; induced coagulation and hemostasis achieved with with bipolar cautery; injected 2 mL of epinephrine to address bleeding; hemostasis achieved Attempted to pass OVESCO clip but unable to traverse UES stricture. Good window for PEG tube visualized in case  required in future. Other SPECIMENS: * Cannot find log *     Impression: Significant clotting in the stomach, clots were removed for evaluation of underlying mucosa. Multiple gastric ulcers visualized, one of the with adherant clot which was suctioned revealing spurting hemorrhage (Zaki 1A). Treated with placement of 4 clips, 2 cc epinephrine injection and bipolar cautery. Attempted to pass the OVESCO clip but unsuccessful given UES stricture. Previously treated esophageal ulcer without bleeding. Good window for PEG if required in the future. NG tube replaced. RECOMMENDATION:  Schedule repeat EGD Xray to confirm NG placement Avoid Tube feeds for 24 hours atleast. Ok to use NG tube for medications or suction. Will suggest low intermittent suction if required. If suspicion for re-bleeding, consider repeat endoscopy. She remains at risk for re bleeding given overall clinical picture. IV PPI BID Repeat EGD in 3 months pending clinical progression. D/W ICU team and family. No Staff Documented     CT high volume bleeding scan abdomen pelvis    Result Date: 4/13/2024  Narrative: CT ABDOMEN AND PELVIS - WITHOUT AND WITH IV CONTRAST INDICATION:   GI bleed. COMPARISON: CT chest abdomen pelvis 4/9/2024. TECHNIQUE: CT examination of the abdomen and pelvis was performed both prior to and after the administration of intravenous contrast. Multiplanar 2D reformatted images were created from the source data. Radiation dose length product (DLP) for this visit: 2202 mGy-cm . This examination, like all CT scans performed in the [facility], was performed utilizing techniques to minimize radiation dose exposure, including the use of iterative reconstruction and automated exposure control. IV Contrast: 85 mL of iohexol (OMNIPAQUE) Enteric Contrast: Not administered. FINDINGS: ABDOMEN BOWEL: Nasogastric tube is present. The stomach is distended with mixed density material. Along the posterior aspect of the gastric body  there is a small hyperattenuating focus on the arterial phase that increases in size and density on the venous phase upon which there is also development of additional surrounding hyperdensities. This is best demonstrated on series 305 images 89 and 93, and concerning for active hemorrhage. Dependent high attenuation material in the fundus increases during the exam likely reflecting pooling blood. No other convincing sites of active bleeding identified. A focus of hyperattenuation in the proximal duodenum seen only on the arterial phase is nonspecific with no correlate or definite extravasation of blood products on the venous phase. Hyperattenuation within the small bowel at the level of the ostomy has a stable appearance across all 3 phases and likely reflects high attenuation intraluminal contents. No dilated loops of bowel. Diffuse mild small bowel wall thickening is likely due to reactive enteritis. Colonic diverticulosis is present without evidence of acute diverticulitis.. LOWER CHEST: Unchanged pulmonary opacities. LIVER/BILIARY TREE: Unremarkable. GALLBLADDER: Incompletely distended limiting assessment. No calcified stones. SPLEEN: Unremarkable. PANCREAS: Unremarkable. ADRENAL GLANDS: Unremarkable. KIDNEYS/URETERS: Nephrocalcinosis and multiple circumscribed lesions of various density similar to prior exam. APPENDIX: No findings to suggest appendicitis. ABDOMINOPELVIC CAVITY: Stable to decreased small volume hemoperitoneum with drainage catheter present in the pelvis. No loculated collections. No pneumoperitoneum. No lymphadenopathy. VESSELS: Infrarenal IVC filter in stable position. No aneurysm. PELVIS REPRODUCTIVE ORGANS: Unremarkable for patient's age. URINARY BLADDER: Ortiz catheter in place. Distended with excreted contrast. No wall thickening. ABDOMINAL WALL/INGUINAL REGIONS: Large abdominal wall defect with end ileostomy. Decreased size with redistribution of hematoma in the abdominal wall. BONES: No  acute fracture or suspicious osseous lesion.     Impression: Findings concerning for active bleeding in the stomach as described above. I personally discussed this study with GEOVANI STRONG on 4/13/2024 5:53 PM. Workstation performed: [id number]     CTA head w wo contrast    Result Date: 4/12/2024  Narrative: CTA BRAIN WITH CONTRAST INDICATION: r/o mycotic aneursym COMPARISON:   4/9/2024; 4/5/2024; 4/6/2024; 4/10/2024; 1/7/2024 TECHNIQUE:   Post contrast imaging was performed after administration of iodinated contrast through the neck and brain. Post contrast axial 0.625 mm images timed to opacify the arterial system. 3D rendering was performed on an independent workstation.   MIP reconstructions performed. Coronal reconstructions were performed of the noncontrast portion of the brain. Radiation dose length product (DLP) for this visit:  1498.58 mGy-cm .  This examination, like all CT scans performed in the [facility], was performed utilizing techniques to minimize radiation dose exposure, including the use of iterative reconstruction and automated exposure control. IV Contrast:  85 mL of iohexol (OMNIPAQUE) IMAGE QUALITY:   Diagnostic FINDINGS: NONCONTRAST BRAIN: PARENCHYMA: Small petechial/juxtacortical hemorrhage in the left parietal lobe towards the vertex, possibly hemorrhagic transformation in the region of recent infarction. The hematoma/small amount of hemorrhage is stable. No significant mass effect. Large resection cavity in the left middle cranial fossa redemonstrated indicative of prior left temporal lobectomy. Small mount of periventricular hypodensities are stable. Bilaterally symmetric coarse basal ganglia and dentate nuclei calcifications redemonstrated. Atherosclerotic calcifications noted. VENTRICLES AND EXTRA-AXIAL SPACES: Stable ventricular size without hydrocephalus. Left temporal lobectomy redemonstrated. VISUALIZED ORBITS: Normal visualized orbits. PARANASAL SINUSES:  Bilateral mastoid air cell effusions noted. Minimal ethmoidal and maxillary sinus disease. Right-sided NG tube noted. INTRACRANIAL VASCULATURE INTERNAL CAROTID ARTERIES: Atherosclerotic calcifications of the cavernous segment of the internal carotid artery are mild..  Normal ophthalmic artery origins.  Normal ICA terminus. ANTERIOR CIRCULATION:  Symmetric A1 segments and anterior cerebral arteries with normal enhancement.  Normal anterior communicating artery. MIDDLE CEREBRAL ARTERY CIRCULATION:  M1 segment and middle cerebral artery branches demonstrate normal enhancement bilaterally. DISTAL VERTEBRAL ARTERIES:  Normal distal vertebral arteries.  Posterior inferior cerebellar artery origins are normal. Normal vertebral basilar junction. BASILAR ARTERY:  Basilar artery is normal in caliber.  Normal superior cerebellar arteries. POSTERIOR CEREBRAL ARTERIES: Both posterior cerebral arteries arises from the basilar tip.  Both arteries demonstrate normal enhancement.   Normal posterior communicating arteries. DURAL VENOUS SINUSES:  Normal. NON VASCULAR ANATOMY BONY STRUCTURES: Left pterional craniotomy noted.     Impression: 1. Stable head CT. Small amount of juxtacortical hemorrhage in the left parietal lobe posterior superiorly towards the vertex possibly hemorrhagic transformation of a small recent infarction as designated on recent prior brain MRIs. No new hemorrhage or significant mass effect. 2. No intracranial aneurysm or major intracranial arterial stenosis. No CTA evidence of mycotic aneurysm or explanation for the small parenchymal hemorrhage in the left parietal vertex. 3. Stable left temporal lobectomy. 4. Calcifications in the basal ganglia and dentate nuclei of the cerebellum redemonstrated possibly dystrophic from prior injury or insult versus metabolic abnormalities. Differential diagnosis includes Fahr's disease. 5. Mild periventricular hypodensities redemonstrated possibly microangiopathy versus  treatment related changes. These are stable. Workstation performed: [id number]     SATURNINO    Result Date: 4/11/2024  Narrative: SATURNINO cancelled due to inability to pass probe into the esophagus, despite assistance of direct visualization with bronchoscope.  Patient tolerated without difficulty.     Bronchoscopy (Bedside)    Result Date: 4/11/2024  Narrative: Clinton Mays MD     4/11/2024  5:38 PM Bronchoscopy (Bedside) Date/Time: 4/11/2024 11:00 AM Performed by: Clinton Mays MD Authorized by: Clinton Mays MD  Patient location:  Bedside Other Assisting Provider: No  Consent:   Consent obtained:  Verbal   Consent given by:  Spouse   Risks discussed:  Bleeding   Alternatives discussed:  No treatment Universal protocol:   Procedure explained and questions answered to patient or proxy's satisfaction: yes    Patient identity confirmed:  Arm band Indications:   Procedure Purpose: diagnostic    Indications: other (comment)    Indications comment:  To assist with bedside transesophageal echocardiogram Sedation:   Sedation type:  Anxiolysis Upper Airway:   Vocal cords movement comment:  Not visualized - patient has tracheostomy Airway:   Airway:  Entered airway through left nare.  Nasal cavity appears normal without polyps or active bleeding.  Epiglottis visualized and appeared normal.  The arytenoids appears edematous.  Nasogastric tube appears to be entering the esophagus at the right lateral aspect of the arytenoids.  I can see the transesophageal echocardiogram attempting to follow the nasogastric tube but cannot see the esophageal opening.  The SATURNINO probe appears to be abutting against edematous soft tissue right lateral of the arytenoids.  With further distal insertion of the SATURNINO there is mild mucosal irration and mild bleeding.  After that the bronchoscope was advanced between the vocal cords and the superior aspect of the tracheostomy tube along with the cuff was visualized.  Some white mucus superior to that was  suctioned.  Post-procedure details:   Patient tolerance of procedure:  Tolerated well, no immediate complications   Complication (if applicable):  None Final Diagnosis/Findings:    Cannot visualize esophageal aperture.  SATURNINO aborted due to risk of esophageal injury    IR drainage tube placement    Result Date: 4/10/2024  Narrative: IMAGE-GUIDED LUMBAR PUNCTURE IMAGE-GUIDED ABCESS DRAIN PLACEMENT History: Prolonged hospital stay, fluid collection in the pelvis Respiratory failure with tracheostomy. Altered mental status. Candidemia Fever and bandemia. Embolic strokes. Lumbar puncture recommended by infectious disease and neurology services. Aspiration/drainage of gluteal collection indicated for identification of infection and source control History of lymphoma, B-cell Contrast: 0 Fluoroscopy time: 0 Anesthesia: General Procedure: After explaining the risks and benefits of the procedures to the patient's spouse, informed consent was obtained. Patient is thrombocytopenic and platelets were given prior to procedure Risks of bleeding worsening sepsis damage to adjacent structures discussed CT was used to localize the pelvis and spine. Radiation dose length product (DLP) for this visit:  1082.84 mGy  (accession [id number]), 0 mGy  (accession [id number]).  This examination, like all CT scans performed in the [facility], was performed utilizing techniques to minimize radiation dose exposure, including the use of iterative reconstruction and automated exposure control. The patient was identified verbally and by wristband. Timeout was performed. Initially we began with the clean procedure, the lumbar puncture. In the prone position, the lower back was prepped and draped in sterile fashion. 1% lidocaine was used for local anesthetic. A suitable location for puncture was identified with CT at lower lumbar level. A 20 gauge spinal needle was advanced into the CSF  space using CT guidance. Opening pressure recorded  at 24 cm of water gravity drainage was used to fill 4 vials of fluid. The fluid was yellow-tinged. 25 cc were removed. The needle was removed and the puncture site was covered with sterile dressing. We now turned attention to pelvic drain placement Using intermittent CT guidance access was gained to the patient's posterior pelvic collection using an 18 gauge Dougherty needle. Pus was encountered A specimen was aspirated, placed in a sterile container and sent to microbiology for culture and sensitivity.  A 0.038 Amplatz wire was advanced into the cavity using fluoroscopic guidance.  After tract dilation, a 10 [place] all-purpose catheter was advanced into the cavity. The catheter was secured in place with Prolene suture and placed to suction bulb drainage. 10 cc of cheesy pus was removed The patient tolerated the procedure well without apparent immediate complications and was returned to the ICU. Specimens: 25 cc CSF removed 20 cc was sent in 4 vials 5 cc was placed into flow cytometry media Pelvic abscess drainage material sent for culture aerobic, anaerobic, fungal     Impression: Impression: CT-guided pelvic 10 [place] drain placement yielding pus, transgluteal approach CT-guided lumbar puncture, abnormal yellow tinge to the CSF fluid Opening pressure 24 cm H2O, unclear if this is a reliable indicator of elevated pressures given prone positioning, paralysis, positive pressure ventilation Workstation performed: [id number]     FL lumbar puncture diagnostic    Result Date: 4/10/2024  Narrative: IMAGE-GUIDED LUMBAR PUNCTURE IMAGE-GUIDED ABCESS DRAIN PLACEMENT History: Prolonged hospital stay, fluid collection in the pelvis Respiratory failure with tracheostomy. Altered mental status. Candidemia Fever and bandemia. Embolic strokes. Lumbar puncture recommended by infectious disease and neurology services. Aspiration/drainage of gluteal collection indicated for identification of infection and source control History of  lymphoma, B-cell Contrast: 0 Fluoroscopy time: 0 Anesthesia: General Procedure: After explaining the risks and benefits of the procedures to the patient's spouse, informed consent was obtained. Patient is thrombocytopenic and platelets were given prior to procedure Risks of bleeding worsening sepsis damage to adjacent structures discussed CT was used to localize the pelvis and spine. Radiation dose length product (DLP) for this visit:  1082.84 mGy  (accession [id number]), 0 mGy  (accession [id number]).  This examination, like all CT scans performed [facility], was performed utilizing techniques to minimize radiation dose exposure, including the use of iterative reconstruction and automated exposure control. The patient was identified verbally and by wristband. Timeout was performed. Initially we began with the clean procedure, the lumbar puncture. In the prone position, the lower back was prepped and draped in sterile fashion. 1% lidocaine was used for local anesthetic. A suitable location for puncture was identified with CT at lower lumbar level. A 20 gauge spinal needle was advanced into the CSF  space using CT guidance. Opening pressure recorded at 24 cm of water gravity drainage was used to fill 4 vials of fluid. The fluid was yellow-tinged. 25 cc were removed. The needle was removed and the puncture site was covered with sterile dressing. We now turned attention to pelvic drain placement Using intermittent CT guidance access was gained to the patient's posterior pelvic collection using an 18 gauge Dougherty needle. Pus was encountered A specimen was aspirated, placed in a sterile container and sent to microbiology for culture and sensitivity.  A 0.038 Amplatz wire was advanced into the cavity using fluoroscopic guidance.  After tract dilation, a 10 [place] all-purpose catheter was advanced into the cavity. The catheter was secured in place with Prolene suture and placed to suction bulb  drainage. 10 cc of cheesy pus was removed The patient tolerated the procedure well without apparent immediate complications and was returned to the ICU. Specimens: 25 cc CSF removed 20 cc was sent in 4 vials 5 cc was placed into flow cytometry media Pelvic abscess drainage material sent for culture aerobic, anaerobic, fungal     Impression: Impression: CT-guided pelvic 10 [place]an drain placement yielding pus, transgluteal approach CT-guided lumbar puncture, abnormal yellow tinge to the CSF fluid Opening pressure 24 cm H2O, unclear if this is a reliable indicator of elevated pressures given prone positioning, paralysis, positive pressure ventilation Workstation performed: [id number]     MRI brain wo contrast    Result Date: 4/10/2024  Narrative: MRI BRAIN WITHOUT CONTRAST INDICATION: History of encephalopathy, multifocal ischemia and possible hemorrhage. COMPARISON:   Multiple prior examinations. Most recent MRI dated 4/6/2024. Recent CT scan dated 4/9/2024. TECHNIQUE:  Multiplanar, multisequence imaging of the brain was performed. IMAGE QUALITY:  Diagnostic. FINDINGS: BRAIN PARENCHYMA: As seen on CT scan performed yesterday there is a new small hemorrhage identified within the left inferior parietal lobe on series 5 and 6, image 20. Small adjacent foci of restricted diffusion are seen on series 3, images 23 through 25  which are new compared to the prior examination. Minor edema within the adjacent brain on T2 and FLAIR imaging. Multiple additional small subcentimeter foci of restricted diffusion are seen within the cerebral hemispheres similar to the prior examination from 4 days ago consistent with small scattered subacute infarcts, possibly embolic in etiology. Periventricular white matter changes immediately adjacent to the surface of the lateral ventricles are stable. Patient has known dentate and basal ganglia calcifications. Stable left temporal lobectomy within the middle cranial fossa. VENTRICLES:  Normal  "for the patient's age. SELLA AND PITUITARY GLAND:  Normal. ORBITS:  Normal. PARANASAL SINUSES:  Normal. VASCULATURE:  Evaluation of the major intracranial vasculature demonstrates appropriate flow voids. CALVARIUM AND SKULL BASE: Extensive bilateral mastoid effusions again identified with fluid signal seen throughout the mastoid air cells, antrum and middle ear cavity bilaterally. EXTRACRANIAL SOFT TISSUES:  Normal.     Impression: As seen on the MRI from 4 days ago, there are small scattered infarcts within the cerebral hemispheres bilaterally suggesting a central embolic source. Small new infarcts are seen within the left inferior parietal lobe with a small acute hemorrhage noted, as seen on yesterday's CT scan. Findings are suggestive of additional small infarcts with focal hemorrhagic transformation. Stable postoperative appearance of the middle cranial fossa on the left status post temporal lobectomy. Complete opacification of the mastoid temporal bones bilaterally is again seen. This examination was marked \"immediate notification\" in Epic in order to begin the standard process by which the radiology reading room liaison alerts the referring practitioner. Workstation performed: [id number]     IR IVC filter placement optional/temporary    Result Date: 4/10/2024  Narrative: PROCEDURE: Inferior vena cava filter placement STAFF: Shahriar Shen M.D. CONTRAST: 20 mL Omnipaque iv. FLUOROSCOPY TIME: 1.9 minutes. NUMBER OF IMAGES: Multiple. COMPLICATIONS: None. MEDICATIONS: None. INDICATION: Posterior tibial vein DVT. The patient has a patent foramen ovale with embolic strokes. There is concern for clot propagation. She has hemorrhagic gastritis precluding anticoagulation. PROCEDURE: Via a right femoral venous approach, a diagnostic inferior vena cavagram was obtained to evaluate for caval thrombus, renal vein anatomy/anomalies and location, caval anatomy/anomalies and diameter. Following the diagnostic study, a " [place] inferior vena cava filter was deployed one centimeter below the lowest renal vein. Post placement radiograph demonstrated good position. The sheath was removed and compression applied to the puncture site until hemostasis was achieved. FINDINGS: 1.  Real-time ultrasound showed the right femoral vein to be anechoic and freely compressible. 2.  Diagnostic inferior vena cavagram showed no caval or renal vein anomalies, no caval thrombus, and a caval diameter under 28 mm. 3.  Final fluoroscopic image of the inferior vena cava filter to be in good position.     Impression: Placement of a temporary inferior vena cava filter. PLAN: This filter can be removed at any time in the future. Interventional Radiology will follow this patient to ensure removal when it is no longer needed. Workstation performed: [id number]     US right upper quadrant    Result Date: 4/10/2024  Narrative: RIGHT UPPER QUADRANT ULTRASOUND INDICATION: concern for choledocholithiasis. COMPARISON: CT from 4/9/2024 TECHNIQUE: Real-time ultrasound of the right upper quadrant was performed with a curvilinear transducer with both volumetric sweeps and still imaging techniques. FINDINGS: PANCREAS: Visualized portions of the pancreas are within normal limits. AORTA AND IVC: Visualized portions are normal for patient age. LIVER: Size: Within normal range. The liver measures 16.9 cm in the midclavicular line. Contour: Surface contour is smooth. Parenchyma: Echogenicity and echotexture are within normal limits. No liver mass identified. Limited imaging of the main portal vein shows it to be patent and hepatopetal. BILIARY: Gallbladder sludge is noted without shadowing stone identified. Mild gallbladder wall thickening. No sonographic Bains sign. No intrahepatic biliary dilatation. CBD measures 3.0 mm. No choledocholithiasis. KIDNEY: Right kidney measures 12.7 x 6.3 x 5.0 cm. Volume 207.6 mL The lower pole of the right kidney is obscured by bowel gas. 8 mm  shadowing calculus mid pole. 1.3 cm shadowing calculus lower pole. 2.7 cm simple cyst lower pole.. ASCITES: Small amount of perihepatic ascites..     Impression: The common bile duct is not dilated. Mild perihepatic ascites. Shadowing calculi in the right kidney Gallbladder sludge with wall thickening but no gallstones or sonographic Bains's sign wall thickening can be associated with hepatic pathology. Correlation with any right upper quadrant pain is advised.. Mild perihepatic ascites. The study was marked in EPIC for immediate notification.. Workstation performed: [id number]     CT head wo contrast    Result Date: 4/10/2024  Narrative: CT BRAIN - WITHOUT CONTRAST INDICATION:   r/o hemorrhagic conversion. COMPARISON: MRI from 4/6/2024 and CT from 4/5/2024. TECHNIQUE:  CT examination of the brain was performed.  Multiplanar 2D reformatted images were created from the source data. Radiation dose length product (DLP) for this visit:  860.98 mGy-cm .  This examination, like all CT scans performed [facility] the [facility], was performed utilizing techniques to minimize radiation dose exposure, including the use of iterative  reconstruction and automated exposure control. IMAGE QUALITY: Study is limited by patient tilted in the gantry. FINDINGS: PARENCHYMA: Limited examination. In the interval since the prior examination there has been development of small focus of hemorrhage within the left parietal lobe on series 3, image 28. This measures 0.8 x 0.8 cm. There also appears to be low-attenuation  involving the left frontoparietal and temporal lobes as well as occipital lobe, new since the prior study. No corresponding restricted diffusion in these regions on prior brain MRI. There are senescent calcifications within the basal ganglia bilaterally as well as cerebellar hemispheres. There is no midline shift. There is atherosclerotic intracranial calcification. Patient is status post left temporal lobectomy.  VENTRICLES AND EXTRA-AXIAL SPACES: Ventricles and extra-axial CSF spaces are prominent commensurate with the degree of volume loss.  No hydrocephalus.  No acute extra-axial hemorrhage. VISUALIZED ORBITS: Normal visualized orbits. PARANASAL SINUSES: Normal visualized paranasal sinuses. CALVARIUM AND EXTRACRANIAL SOFT TISSUES: Status post left temporal craniotomy. Bilateral mastoid air cell effusions.     Impression: Interval development of a small amount of hemorrhage within the left parietal lobe since 4/5/2024. Low-attenuation involving the left cerebral hemisphere as described above appears new since prior MRI and CT. Findings may be technical secondary to patient tilt of the gantry versus secondary to a developing infarct. Repeat MRI is recommended for further evaluation. Remainder of the findings, as described above. I personally discussed this study with Dr. Medrano on 4/10/2024 8:51 AM. Workstation performed: [id number]     CT chest abdomen pelvis w contrast    Result Date: 4/9/2024  Narrative: CT CHEST, ABDOMEN AND PELVIS WITH IV CONTRAST INDICATION: r/o bleed. COMPARISON: Comparison is made to prior studies, the most recent CT scan is dated April 5, 2024. TECHNIQUE: CT examination of the chest, abdomen and pelvis was performed. Multiplanar 2D reformatted images were created from the source data. This examination, like all CT scans performed in the [facility], was performed utilizing techniques to minimize radiation dose exposure, including the use of iterative reconstruction and automated exposure control. Radiation dose length product (DLP) for this visit: 750.02 mGy-cm IV Contrast: 100 mL of iohexol (OMNIPAQUE) Enteric Contrast: Not administered. FINDINGS: CHEST LUNGS: No significant change in bilateral groundglass opacities and areas of consolidation particularly in the lower lobes. Bibasilar bronchiectasis stable. Central airways are patent. Tracheostomy tube unchanged. PLEURA: Small right  pleural effusion. HEART/GREAT VESSELS: Heart is unremarkable for patient's age. No thoracic aortic aneurysm. MEDIASTINUM AND KIRBY: Enteric tube in position, with the tip in the proximal gastric body. The sidehole is near the GE junction. CHEST WALL AND LOWER NECK: Heterogeneous enlarged right lobe of the thyroid. Status post left thyroidectomy. ABDOMEN LIVER/BILIARY TREE: Unremarkable. GALLBLADDER: No calcified gallstones. No pericholecystic inflammatory change. SPLEEN: Unremarkable. PANCREAS: Unremarkable. ADRENAL GLANDS: Unremarkable. KIDNEYS/URETERS: Bilateral renal cysts and multiple punctate calcifications throughout both kidneys suggestive of medullary nephrocalcinosis. STOMACH AND BOWEL: There is a rectal tube in place unchanged. Patient is status post ileocecectomy. There is a midline ileostomy in place. No evidence of bowel obstruction. APPENDIX: No findings to suggest appendicitis. ABDOMINOPELVIC CAVITY: High density free fluid in the cul-de-sac, likely small volume hemoperitoneum. Small volume ascites throughout the abdomen and pelvis similar to the prior exam. Small amount of presacral edema. VESSELS: IVC filter appropriately placed. PELVIS REPRODUCTIVE ORGANS: Unremarkable for patient's age. URINARY BLADDER: Collapsed around Ortiz catheter balloon. ABDOMINAL WALL/INGUINAL REGIONS: Wound VAC in the midline abdomen adjacent to the ileostomy with surrounding packing material. Moderate body wall edema. 6 x 1.8 x 5 cm hematoma in the anterior abdominal wall to the right of midline near the wound VAC. BONES: No acute fracture or suspicious osseous lesion.     Impression: Small volume hemoperitoneum in the cul-de-sac, and small anterior abdominal wall hematoma. No large hematoma in the chest abdomen or pelvis. Otherwise stable findings. Workstation performed: [id number]     XR chest portable ICU    Result Date: 4/9/2024  Narrative: XR CHEST PORTABLE ICU INDICATION: tachypnea. COMPARISON: April 4, 2024 FINDINGS:  Examination performed in the somewhat rotated projection. Tracheostomy catheter in place. Right internal jugular central venous catheter tip over superior vena cava. Consolidative density throughout the left lung most dense in the left lung base. Small patchy consolidative density in the lateral right mid chest. Previously noted right chest consolidation appears improved from previous exam. No pneumothorax or pleural  effusion. Normal cardiomediastinal silhouette. Bones are unremarkable for age. Normal upper abdomen.     Impression: Multifocal consolidative opacities most dense over the left lung and left lower lung with small patchy density in the lateral right mid chest. Pulmonary parenchymal density appears decreased in the right mid chest. The appearance is suspicious for multifocal pneumonia. Continued radiographic follow-up recommended. Workstation performed: [id number]      VAS VENOUS DUPLEX - LOWER LIMB BILATERAL    Result Date: 4/7/2024  Narrative:  [facility] REPORT CLINICAL: Indications: Physician wants to determine patency of the venous system secondary to cardioembolic CVA with recent discovery of a patent foramen ovale. Operative History: 2024-04-07 SATURNINO Risk Factors The patient has history of CKD.  FINDINGS:  Right       Impression  PostTibial  Subacute       CONCLUSION: Impression: RIGHT LOWER LIMB: Evidence of subacute, occlusive deep vein thrombosis identified in the paired posterior tibial veins. No evidence of superficial thrombophlebitis noted. Doppler evaluation shows a normal response to augmentation maneuvers.. Popliteal, posterior tibial and anterior tibial arterial Doppler waveform's are triphasic.  LEFT LOWER LIMB: No evidence of acute or chronic deep vein thrombosis. No evidence of superficial thrombophlebitis noted. Doppler evaluation shows a normal response to augmentation maneuvers. Popliteal, posterior tibial and anterior tibial arterial Doppler waveform's are triphasic.   Technical findings shared with Dr. Bowden, Dr. Lazcano and posted in Epic.  SIGNATURE: Electronically Signed by: ADA CRENSHAW on 2024-04-07 07:52:48 PM    Echo complete w/ contrast if indicated    Result Date: 4/7/2024  Narrative:   Left Ventricle: Left ventricular cavity size is normal. Wall thickness is mildly increased. There is mild concentric hypertrophy. The left ventricular ejection fraction is 70%. Systolic function is hyperdynamic.   Atrial Septum: There is  right to left shunting noted using saline contrast at rest and with provocation (abdominal compression) which may be related to a PFO or intrapulmonary shunt.   Aorta: The aortic root is mildly dilated. The aortic root is 4.20 cm.   Technically very difficult study.   No definite evidence of valvular vegetations on the study.     MRI brain wo contrast    Result Date: 4/6/2024  Narrative: MRI BRAIN WITHOUT CONTRAST INDICATION: persistent encephalopathy, uremia. COMPARISON:   Brain MRI 1/10/2024. TECHNIQUE:  Multiplanar, multisequence imaging of the brain was performed. IMAGE QUALITY:  Diagnostic. FINDINGS: BRAIN PARENCHYMA: Multiple small foci of acute infarcts involving bilateral frontal lobes, right parietal and occipital lobes, bilateral basal ganglia, and bilateral corona radiata. There is questionable tiny acute infarct in the left occipital lobe (series 3 image 14). There is questionable tiny acute infarct in the left cerebellum (series 3 image 11). There is no significant edema or mass effect. No midline shift. There is partial lack of sulcal CSF suppression in the FLAIR sequence seen in supratentorial and infratentorial brain. Redemonstrated postsurgical change from prior partial left temporal lobe resection with mild caliectasis along the resection margin. There is no discrete mass. No acute intracranial hemorrhage. Stable focus of old microhemorrhage in the right periatrial white matter (series 7 image 17). VENTRICLES:  Normal for the  patient's age. SELLA AND PITUITARY GLAND:  Normal. ORBITS:  Normal. PARANASAL SINUSES: Left greater than right mild maxillary sinus mucosal thickening. VASCULATURE:  Evaluation of the major intracranial vasculature demonstrates appropriate flow voids. CALVARIUM AND SKULL BASE:  Normal. Complete opacification of bilateral mastoid air cells and middle ears. EXTRACRANIAL SOFT TISSUES:  Normal.     Impression: 1.  Multiple small bilateral foci of acute infarcts as described suggesting embolic etiology. No significant edema, mass effect, or hemorrhagic transformation. 2.  Partial lack of sulcal CSF suppression in the FLAIR sequence noted in the supratentorial and infratentorial brain. This could be artifactual/technique related; however, inflammatory or infectious process is not excluded. Correlate with clinical assessment. 3.  Stable postoperative appearance from prior partial left temporal lobe resection. 4.  Complete opacification of bilateral mastoids air cells and middle ears. The study was marked in EPIC for immediate notification. Workstation performed: [id number]     US thyroid    Result Date: 4/5/2024  Narrative: THYROID ULTRASOUND INDICATION: thyroid enlargement, collapsing of airway -- recommended per radiologist. COMPARISON: CT chest abdomen pelvis 4/5/2024 TECHNIQUE: Ultrasound of the thyroid was performed with a high frequency linear transducer in transverse and sagittal planes including volumetric imaging sweeps as well as traditional still imaging technique. FINDINGS: Limited exam due to overlying tracheostomy tube and central line catheter on the right. Thyroid texture: Heterogeneous thyroid echotexture. Lobular contour. Vascularity within normal limits. Right lobe: 4.6 x 2.2 x 1.8 cm. Volume 8.8 mL Left lobe: Prior left lobectomy. Isthmus: 1.1 cm. No discrete thyroid nodules are demonstrated.     Impression: 1. Limited exam due to overlying tracheostomy tube and central line catheter. Heterogeneous  lobular appearance to the remaining thyroid gland, nonspecific, question sequela of thyroiditis. No discrete nodule identified. Reference: ACR Thyroid Imaging, Reporting and Data System (TI-RADS): White Paper of the ACR TI-RADS Committee. J AM Maria G Radiol 2017;14:587-595. Additional recommendations based on American Thyroid Association 2015 guidelines. Workstation performed: [id number]     CT chest abdomen pelvis w contrast    Result Date: 4/5/2024  Narrative: CT CHEST, ABDOMEN AND PELVIS WITH IV CONTRAST INDICATION: Encephalopathy, bandemia, hematuria, s/p abdominal surgery, pneumonitis. COMPARISON: CT abdomen/pelvis 4/1/2024. CT chest 1/31/2024. AP chest from yesterday. TECHNIQUE: CT examination of the chest, abdomen and pelvis was performed. Multiplanar 2D reformatted images were created from the source data. This examination, like all CT scans performed in the [facility], was performed utilizing techniques to minimize radiation dose exposure, including the use of iterative reconstruction and automated exposure control. Radiation dose length product (DLP) for this visit: 1318.8 mGy-cm IV Contrast: 100 mL of iohexol (OMNIPAQUE) Enteric Contrast: Not administered. FINDINGS: CHEST LUNGS: Persistent bilateral groundglass and consolidative opacities throughout both lungs most prominent in the lower lobes improved from 1/31/2024. Unchanged bilateral lower lobe bronchiectasis. No endotracheal or endobronchial lesion. Tracheostomy tube tip located above the tracee. PLEURA: New small right pleural effusion. HEART/GREAT VESSELS: Heart is unremarkable for patient's age. No thoracic aortic aneurysm. MEDIASTINUM AND KIRBY: Unremarkable. CHEST WALL AND LOWER NECK: Status post left thyroidectomy. Heterogeneous multinodular right thyroid. ABDOMEN LIVER/BILIARY TREE: Unremarkable. GALLBLADDER: Vicarious excretion of contrast material. Pericholecystic fluid likely on the basis of perihepatic ascites. No wall  thickening. SPLEEN: Unremarkable. PANCREAS: Unremarkable. ADRENAL GLANDS: Subcentimeter left adrenal nodularity does not need further follow-up. KIDNEYS/URETERS: No hydronephrosis. Innumerable bilateral cyst unchanged from prior studies. Innumerable punctate medullary calcifications/medullary nephrocalcinosis. 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 STOMACH AND BOWEL: Enteric tube tip is located in the gastric lumen. Rectal tube is present. Status post ileocecectomy. Midline ileostomy as described above with surrounding wound with subcutaneous debris/packing material. No bowel obstruction. APPENDIX: No findings to suggest appendicitis. ABDOMINOPELVIC CAVITY: Trace ascites. No pneumoperitoneum. No lymphadenopathy. VESSELS: Unremarkable for patient's age. PELVIS REPRODUCTIVE ORGANS: Unremarkable for patient's age. URINARY BLADDER: Decompressed with a Ortiz catheter. ABDOMINAL WALL/INGUINAL REGIONS: Midline ileostomy as described above with surrounding wound with subcutaneous debris/packing material. BONES: No acute fracture or suspicious osseous lesion.     Impression: Improved but persistent groundglass and consolidative bilateral pulmonary opacities. No new intra-abdominal or intrapelvic findings. Workstation performed: [id number]     CT head wo contrast    Result Date: 4/5/2024  Narrative: CT BRAIN - WITHOUT CONTRAST INDICATION:   Encephalopathy, bandemia, hematuria, s/p abdominal surgery, pneumonitis. COMPARISON: CT head without contrast 3/31/2024, 3/13/2024, 3/9/2024, 1/7/2024. MRI brain seizure with and without contrast 1/10/2024. MRI brain without contrast 1/8/2024. CTA head and neck with and without contrast 1/7/2024 TECHNIQUE:  CT examination of the brain was performed.  Multiplanar 2D reformatted images were created from the source data. Radiation dose length product (DLP) for this visit:  931.37 mGy-cm .  This examination, like all CT scans performed in the [facility], was  performed utilizing techniques to minimize radiation dose exposure, including the use of iterative  reconstruction and automated exposure control. IMAGE QUALITY:  Diagnostic. FINDINGS: PARENCHYMA AND EXTRA-AXIAL SPACES: Decreased attenuation is noted in periventricular and subcortical white matter demonstrating an appearance that is statistically most likely to represent mild microangiopathic change, similar to prior exam. Unchanged postsurgical changes of left partial temporal lobectomy with large cystic resection cavity. No CT signs of acute infarction given limitations of beam-hardening streak artifact and posterior fossa.  No intracranial mass, mass effect or midline shift.  No acute parenchymal hemorrhage. Unchanged symmetric dense calcifications in bilateral medial basal ganglia and bilateral cerebellum. Unchanged faint calcifications in bilateral frontal cortex. VENTRICLES :  Normal for the patient's age. VISUALIZED ORBITS: Normal visualized orbits. PARANASAL SINUSES: Small amount of frothy secretions in right sphenoid sinus. Small left maxillary mucous retention cyst. Partially imaged right nasoenteric tube. CALVARIUM AND EXTRACRANIAL SOFT TISSUES: Left temporal craniotomy with bone flap cranioplasty and microplate screw fixation. Large bilateral mastoid and bilateral middle ear effusions, worsened on right side.     Impression: No acute intracranial abnormality status post left partial temporal lobectomy given limitations of beam-hardening streak artifact in posterior fossa. Similar mild chronic microangiopathy. Large bilateral mastoid and bilateral middle ear effusions, worsened on right side. Workstation performed: [id number]     XR chest portable ICU    Result Date: 4/4/2024  Narrative: XR CHEST PORTABLE ICU INDICATION: HD line confirmation. COMPARISON: Radiograph from 4/4/2024 FINDINGS: Tracheostomy tube. Enteric tube courses inferiorly along the expected course of the esophagus, tip below the level of  the hemidiaphragms. Right IJ catheter tip overlying the lower SVC. Stable moderate diffuse hazy bilateral airspace opacities. Bibasilar atelectasis. No pneumothorax or pleural effusion. Normal cardiomediastinal silhouette. Bones are unremarkable for age. Normal upper abdomen.     Impression: 1. Interval placement of right IJ catheter tip overlying the lower SVC. Other tubes/lines stable. 2. Similar appearance of moderate diffuse bilateral infectious/inflammatory airspace opacities. Workstation performed: [id number]     VAS upper limb venous duplex scan, unilateral/limited    Result Date: 4/4/2024  Narrative:  [facility] REPORT CLINICAL: Indications: Patient presents with left upper extremity edema. Patient is currently on a ventilator. Operative History: No prior cardiovascular surgeries Risk Factors The patient has history of CKD.  FINDINGS:  Left                       Thrombus           Impression      Innominate Vein                               Not Visualized  Ceph Upper                 Acute - Occlusive                  Ceph Mid Arm               Acute - Occlusive                  Cephalic Upper Arm Distal  Acute - Occlusive                  Ceph AC                    Acute - Occlusive                     CONCLUSION:  Impression  RIGHT UPPER LIMB LIMITED: Unable to evaluate right neck vessels due to positioning and medical straps.  LEFT UPPER LIMB: No evidence of acute or chronic deep vein thrombosis. Innominate vein not visualized due to straps. There is evidence of acute occlusive superficial thrombophlebitis in the cephalic vein from the elbow to proximal upper arm. Doppler evaluation shows a normal response to augmentation maneuvers.  Technical findings given via tiger text to Dr. Bowden.  SIGNATURE: Electronically Signed by: HANNAH BRENNER MD, RPVI on 2024-04-04 11:26:01 AM    EGD    Result Date: 4/4/2024  Narrative: Table formatting from the original result was not included. [facility]  [facility] Endoscopy [address] [facility] [address] [phone] DATE OF SERVICE: 4/03/24 PHYSICIAN(S): Attending: No Staff Documented Fellow: No Staff Documented INDICATION: Hematemesis POST-OP DIAGNOSIS: See the impression below. PREPROCEDURE: Informed consent was obtained for the procedure, including sedation.  Risks of perforation, hemorrhage, adverse drug reaction and aspiration were discussed. The patient was placed in the left lateral decubitus position. Patient was explained about the risks and benefits of the procedure. Risks including but not limited to bleeding, infection, and perforation were explained in detail. Also explained about less than 100% sensitivity with the exam and other alternatives. PROCEDURE: EGD DETAILS OF PROCEDURE: Patient was taken to the procedure room where a time out was performed to confirm correct patient and correct procedure. The patient underwent monitored anesthesia care, which was administered by an anesthesia professional. The patient's blood pressure, heart rate, level of consciousness, respirations, oxygen, ECG and ETCO2 were monitored throughout the procedure. The scope was introduced through the mouth and advanced to the second part of the duodenum. Retroflexion was performed in the fundus. Prior to the procedure, the patient's H. Pylori status was unknown. The patient experienced no blood loss. The procedure was not difficult. The patient tolerated the procedure well. There were no apparent adverse events. ANESTHESIA INFORMATION: ASA: ASA status cannot be found on the log. Anesthesia Type: Anesthesia type cannot be found on the log. MEDICATIONS: Totals unavailable because the procedure time range is not set FINDINGS: Localized ulcerated mucosa in the upper third of the esophagus (15 cm from the incisors) Irregular Z-line 44 cm from the incisors Moderate esophagitis Blood clotting in the body of the stomach and antrum The duodenal bulb and 2nd part of the duodenum  appeared normal. A nasogastric tube was placed in the body of the stomach SPECIMENS: * Cannot find log *     Impression: Ulcerated mucosa in the upper third of the esophagus without evidence of hemorrhage Mild esophagitis Blood clotting in the body of the stomach and antrum again limiting visualization, but without worsening from previous exam The duodenal bulb and 2nd part of the duodenum appeared normal. RECOMMENDATION:  NG tube placed, ok to use for meds, tube feeds Continue PPI iv bid Monitor NG tube output, stools Trend Hb Follow up previous path    No Staff Documented     XR chest portable ICU    Result Date: 4/4/2024  Narrative: XR CHEST PORTABLE ICU INDICATION: Worsening clinical status, vent dependent. COMPARISON: 4/1/2024 FINDINGS: Tracheotomy tube positioning unchanged. Nasogastric tube tip and gastric fundus and sideport in region of esophagogastric junction. There is persistent airspace disease within the right midlung field, now with hazy left lung  opacities as well. No pneumothorax or pleural effusion. Normal cardiomediastinal silhouette. Bones are unremarkable for age. Normal upper abdomen.     Impression: 1. Stable appearance of right lung infiltrate, now with developing hazy airspace opacities in the left lung 2. Nasogastric tube tip in the gastric fundus and sideport at the esophagogastric junction. May consider advancement to assure sideport is below the EG junction. The examination demonstrates a significant  finding and was documented as such in [facility] for liaison and referring practitioner notification. Workstation performed: [id number]     EGD    Result Date: 4/2/2024  Narrative: Table formatting from the original result was not included. [facility] Endoscopy [address] [facility] [address] [phone] DATE OF SERVICE: 4/02/24 PHYSICIAN(S): Attending: Alireza Lea MD Fellow: Shahriar Bernard MD INDICATION: Hemorrhagic shock (HCC) POST-OP DIAGNOSIS: See the impression  below. PREPROCEDURE: Informed consent was obtained for the procedure, including sedation.  Risks of perforation, hemorrhage, adverse drug reaction and aspiration were discussed. The patient was placed in the left lateral decubitus position. Patient was explained about the risks and benefits of the procedure. Risks including but not limited to bleeding, infection, and perforation were explained in detail. Also explained about less than 100% sensitivity with the exam and other alternatives. PROCEDURE: EGD DETAILS OF PROCEDURE: Patient was taken to the procedure room where a time out was performed to confirm correct patient and correct procedure. The patient underwent monitored anesthesia care, which was administered by an intensivist. The patient's blood pressure, heart rate, level of consciousness, respirations, oxygen, ECG and ETCO2 were monitored throughout the procedure. The scope was introduced through the mouth and advanced to the second part of the duodenum. Retroflexion was performed in the fundus. Prior to the procedure, the patient's H. Pylori status was unknown. The patient experienced no blood loss. The procedure was not difficult. The patient tolerated the procedure well. There were no apparent adverse events. ANESTHESIA INFORMATION: ASA: ASA status not filed in the log. Anesthesia Type: Anesthesia type not filed in the log. MEDICATIONS: levalbuterol (XOPENEX) inhalation solution 1.25 mg 1.25 mg sodium chloride 3 % inhalation solution 4 mL 4 mL (Totals for administrations occurring from 1315 to 1407 on 04/02/24) FINDINGS: Ulcerated mucosa in the upper third of the esophagus (15 cm from the incisors); there was stigmata of recent hemorrhage, and bleeding occurred before intervention. 2 focal tears were visualized in the proximal and mid esophagus measures 1 cm each with large clot which was removed. The proximal location has mild oozing but in setting of thrombocytopenia and location this was not treated  Significant amount of blood clotting in the esophagus and stomach, which obscured visualization. All old blood and clots. This was removed from the esophagus Z-line 43 cm from the incisors The duodenal bulb and 2nd part of the duodenum appeared normal. Multiple small, superficial, round, benign-appearing ulcers in the cardia, fundus of the stomach and body of the stomach with clean base (Zaki III); performed cold forceps biopsy SPECIMENS: ID Type Source Tests Collected by Time Destination 1 : Rule out CMV, HSV and candida Tissue Stomach CMV CULTURE, TISSUE EXAM Alireza Lea MD 4/2/2024  2:00 PM      Impression: Ulcerated esophageal tear 15 cm from incisors with slow oozing initially after clot clearance with cessation of hemorrhage at end of exam Blood and extensive clot present in the esophagus and stomach The duodenal bulb and 2nd part of the duodenum appeared normal. Multiple, small ulcers and trauma throughout stomach with clean base (Zaki III); performed cold forceps biopsy RECOMMENDATION:  Await pathology results  Await pathology results  Continue PPI iv bid Give Reglan 10 mg iv q8h Hold on further enteral access for 24 hrs Will plan for repeat EGD tomorrow to eval the areas of tear Special specimen send for HSV, CMV and candidiasis NG tube trauma (difficult placement) possible cause for esophageal superficial tear Erosive gastritis likely due to high steroids, NG suction trauma vs. Possible infectious etiology in setting of immunosuppressive therapy    Alireza Lea MD     CT chest w contrast    Addendum Date: 4/1/2024 Addendum:   ADDENDUM: Please note the findings described in the body of an enlarged heterogeneous right thyroid lobe is new from recent CT chest performed 3 weeks ago. Given the new tracheostomy this most likely is procedure related possibly hemorrhage. Ultrasound characterization may be obtained in a few weeks. I personally discussed this study with Moises Bowden on 4/1/2024 5:54 PM.  .    Result Date: 4/1/2024  Narrative: CT CHEST WITH IV CONTRAST INDICATION: bilateral opacities, tachypnea. COMPARISON: Compared with 3/10/2024 TECHNIQUE: CT examination of the chest was performed. Multiplanar 2D reformatted images were created from the source data. This examination, like all CT scans performed in the [facility], was performed utilizing techniques to minimize radiation dose exposure, including the use of iterative reconstruction and automated exposure control. Radiation dose length product (DLP) for this visit: 436.63 mGy-cm IV Contrast: 85 mL of iohexol (OMNIPAQUE) FINDINGS: LUNGS: Bilateral parenchymal groundglass haziness with focal areas of consolidation like change new in the right upper lobe peripherally and in the lower lobes bilaterally posteriorly. Breathing motion artifact limits evaluation of bronchiectatic changes  in the lower lobes bilaterally.. There is no tracheal or endobronchial lesion. PLEURA: Unremarkable. HEART/GREAT VESSELS: Heart is unremarkable for patient's age. No thoracic aortic aneurysm. MEDIASTINUM AND KIRBY: Unremarkable. CHEST WALL AND LOWER NECK: Enlarged heterogeneous right thyroid lobe. Left lobe not seen. Tracheostomy tube in place. VISUALIZED STRUCTURES IN THE UPPER ABDOMEN: Nasogastric tube in the stomach with fluid. OSSEOUS STRUCTURES: No acute fracture or destructive osseous lesion.     Impression: Bilateral parenchymal infiltrative changes with consolidation like focus in the right upper lobe is new. Relative worsening. Workstation performed: [id number]     Echo follow up/limited w/ contrast if indicated    Result Date: 4/1/2024  Narrative:   Left Ventricle: Left ventricular cavity size is normal. Wall thickness is mildly increased. There is concentric remodeling. The left ventricular ejection fraction is 70%. Systolic function is hyperdynamic. Wall motion is normal.   Right Ventricle: Right ventricular cavity size is normal. Systolic function  is hyperdynamic.   Mitral Valve: There is trace regurgitation.   Tricuspid Valve: There is trace regurgitation.   Prior TTE study available for comparison. Prior study date: 3/17/2024. No significant changes noted compared to the prior study.     CT abdomen pelvis w contrast    Result Date: 4/1/2024  Narrative: CT ABDOMEN AND PELVIS WITH IV CONTRAST INDICATION: blood clots in urine, currently doing cbi. COMPARISON: CT chest/abdomen/pelvis dated 3/10/2024. TECHNIQUE: CT examination of the abdomen and pelvis was performed. Multiplanar 2D reformatted images were created from the source data. This examination, like all CT scans performed in the [facility], was performed utilizing techniques to minimize radiation dose exposure, including the use of iterative reconstruction and automated exposure control. Radiation dose length product (DLP) for this visit: 705.77 mGy-cm IV Contrast: 100 mL of iohexol (OMNIPAQUE) Enteric Contrast: Not administered. FINDINGS: ABDOMEN LOWER CHEST: Persistent bilateral lower lobe airspace consolidation. LIVER/BILIARY TREE: Unremarkable. GALLBLADDER: No calcified gallstones. No pericholecystic inflammatory change. SPLEEN: Unremarkable. PANCREAS: Unremarkable. ADRENAL GLANDS: Unremarkable. KIDNEYS/URETERS: Numerous bilateral renal cysts measuring up to 2.3 cm on the left and other subcentimeter hypodensities which are too small to characterize. Numerous bilateral renal calculi/calcifications redemonstrated probably due to medullary sponge kidney. No hydronephrosis. STOMACH AND BOWEL: No evidence for bowel obstruction. The patient is again noted to be status post ileocolectomy with right lower quadrant ileostomy. Associated edema and small foci of subcutaneous emphysema consistent with postsurgical change are not significantly changed. No focal drainable fluid collection is identified. APPENDIX: No findings to suggest appendicitis. ABDOMINOPELVIC CAVITY: Trace ascites. No  pneumoperitoneum. No lymphadenopathy. VESSELS: Unremarkable for patient's age. PELVIS REPRODUCTIVE ORGANS: Unremarkable for patient's age. URINARY BLADDER: Ortiz catheter within the urinary bladder. ABDOMINAL WALL/INGUINAL REGIONS: Unremarkable. BONES: No acute fracture or suspicious osseous lesion.     Impression: Stable postsurgical changes status post ileocolectomy with right lower quadrant ileostomy with persistent associated inflammatory changes and subcutaneous emphysema. No focal drainable fluid collection identified. Trace ascites. Persistent bilateral lower lobe atelectasis. Workstation performed: [id number]     XR chest portable    Result Date: 4/1/2024  Narrative: XR CHEST PORTABLE INDICATION: AMS, possible aspiration. COMPARISON: Chest radiograph and CT of the chest 3/31/2024. FINDINGS: Tracheostomy in unchanged position. NG tube tip below the level of the left hemidiaphragm, extending inferior outside of the field-of-view. When compared to chest radiograph 3/31/2024, there is improvement in the previously described groundglass opacities in the left lung. The previously mentioned focal consolidation in the right midlung zone has not significantly changed. No pneumothorax or  pleural effusion. Normal cardiomediastinal silhouette. Bones are unremarkable for age. Normal upper abdomen.     Impression: Focal consolidation in the right middle lung zone is approximately the same when compared to 3/31/2024, concerning for pneumonia. Improvement in background groundglass consolidations in the left lung when compared to 3/31/2024. Resident: LOUISE LEUNG I, the attending radiologist, have reviewed the images and agree with the final report above. Workstation performed: [id number]     XR chest portable ICU    Result Date: 3/31/2024  Narrative: XR CHEST PORTABLE ICU INDICATION: tachypnea. COMPARISON: CXR 3/29/2024 and 3/22/2024, chest CT 3/31/2024. FINDINGS: Tracheostomy projecting over the trachea. NG tube  below the diaphragm. Persistent groundglass opacity with new consolidation in the right midlung. No pneumothorax or pleural effusion. Normal cardiomediastinal silhouette. Bones are unremarkable for age. Normal upper abdomen.     Impression: Persistent groundglass opacity with new consolidation in the right midlung. See chest CT for further evaluation. Workstation performed: [id number]     CT head wo contrast    Result Date: 3/31/2024  Narrative: CT BRAIN - WITHOUT CONTRAST INDICATION:   AMS. COMPARISON: 3/13/2024 TECHNIQUE:  CT examination of the brain was performed.  Multiplanar 2D reformatted images were created from the source data. Radiation dose length product (DLP) for this visit:  856.42 mGy-cm .  This examination, like all CT scans performed in the [facility], was performed utilizing techniques to minimize radiation dose exposure, including the use of iterative  reconstruction and automated exposure control. IMAGE QUALITY:  Diagnostic. FINDINGS: PARENCHYMA: Cystic encephalomalacia and gliosis in the left middle cranial fossa in the left temporal pole subjacent to a pterional craniotomy redemonstrated indicative of left temporal lobectomy. Stable basal ganglia calcifications are bilaterally symmetric. Mild periventricular hypodensities noted. There are also bilateral cerebellar calcifications in the region of the dentate nuclei. VENTRICLES AND EXTRA-AXIAL SPACES: Stable. No hydrocephalus. VISUALIZED ORBITS: Conjugate gaze to the right. PARANASAL SINUSES: Mucosal thickening CALVARIUM AND EXTRACRANIAL SOFT TISSUES: Left pterional craniotomy again noted     Impression: 1. No acute intracranial hemorrhage, mass effect or edema. 2. 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 3. 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 Differential diagnosis could include treatment related changes versus other  etiologies.. Workstation performed: [id number]     XR chest portable ICU    Result Date: 3/30/2024  Narrative: XR CHEST PORTABLE ICU INDICATION: tachypnea. COMPARISON: CXR 3/22/2024, chest CT 3/10/2024. FINDINGS: Tracheostomy. NG tube below the diaphragm. Worsening bilateral groundglass opacity. No pneumothorax or pleural effusion. Normal cardiomediastinal silhouette. Bones are unremarkable for age. Persistent mild elevation of the right diaphragm.     Impression: Worsening bilateral groundglass opacity. Workstation performed: [id number]     FL barium swallow video w speech    Result Date: 3/27/2024  Narrative: A video barium swallow study was performed by the Department of Speech Pathology. Please refer to the report for the official interpretation. The images are stored for archival purposes only. Study images were not formally reviewed by the Radiology Department.    VAS upper limb venous duplex scan, unilateral/limited    Result Date: 3/24/2024  Narrative:  [facility] REPORT CLINICAL: Indications: Patient presents with left upper extremity edema x few days. Risk Factors The patient has history of CKD.  FINDINGS:  Left                       Thrombus           Cephalic Upper Arm Distal  Acute - Occlusive  Ceph AC                    Acute - Occlusive     CONCLUSION:  Impression RIGHT UPPER LIMB LIMITED: Unable to evaluate the neck veins due to medical devises.  LEFT UPPER LIMB: No evidence of acute or chronic deep vein thrombosis. Unable to evaluate IJV and innominate vein due medical devises. There is evidence of acute occlusive superficial thrombophlebitis noted in the cephalic vein at the elbow and distal upper arm. Doppler evaluation shows a normal response to augmentation maneuvers.  Technical findings were given to Dr. Sindi Recinos.  SIGNATURE: Electronically Signed by: ABIMAEL GONZALES DO on 2024-03-24 09:56:59 PM    XR chest portable    Result Date: 3/22/2024  Narrative: XR CHEST PORTABLE INDICATION:  hypoxia. COMPARISON: Compared with 3/20/2024 FINDINGS: Tracheostomy tube. Feeding tube in the stomach. Mild prominent interstitial markings. No pneumothorax or pleural effusion. Normal cardiomediastinal silhouette. Bones are unremarkable for age. Normal upper abdomen.     Impression: Mild congestive changes. Workstation performed: [id number]     XR chest portable ICU    Result Date: 3/20/2024  Narrative: XR CHEST PORTABLE ICU INDICATION: f/u pneumonia. COMPARISON: Radiograph March 14, 2024 FINDINGS: Tracheostomy tube in place. Enteric tube coursing into the stomach. Multifocal patchy airspace opacities in a bronchiolar distribution, particularly in the lower lobes, overall slightly improved. No pneumothorax or pleural effusion. Normal cardiomediastinal silhouette. Bones are unremarkable for age. Normal upper abdomen.     Impression: Overall mild improvement in multifocal bronchopneumonia, likely aspiration related. Workstation performed: [id number]       EKG, Pathology, and Other Studies Reviewed on Admission:   EKG: Reviewed      Counseling / Coordination of Care Time: 30 total mins spent n consult. Greater than 50% of total time spent on patient counseling and coordination of care.    Karlene Millard DO  Gastroenterology Fellow  [facility]  Division of Gastroenterology and Hepatology  Available on Mophie  ...............................................................................................................................................  ** Please Note: This note is constructed using a voice recognition dictation system. **

## 2024-04-18 NOTE — PROGRESS NOTES
[facility]  Progress Note: Critical Care  Name: Carla Hunt 59 y.o. 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  LP 4/10/24: ME panel negative, autoimmune panel negative, CSF cx and gram stain negative.   Azotemia d/t GIB with elevated ammonia s/p EGD x2 and start lactulose to goal 2-3 Bms per day.   CVA  Most recent imaging 4/10/24: confirms likely [septic] embolic strokes from prior scans + several new small areas of infarction + a small new hemorrhage in the left inferior parietal lobe.  SATURNINO requested to assess for endocarditis in light of brain MRI concerning for septic emboli in setting of fungemia, but aborted due to difficult access to esophagus despite visual aid by bronchoscope.  F/up TTE without evidence of valvular vegetations  Patient does have a PFO on bubble study with a DVT in the RLE which also contributes to stroke risk   Holding ASA, c/w DVT ppx with heparin if platelets >50K  Hx of Seizure  Continue home Vimpat  S/p partial  temporal lobe resection in 2007   Anxiety   ICU delirium precautions.     CV:  Sinus tachycardia  Etiology likely secondary to infectious processes (fungemia, intra-abdominal infection related to ostomy, pneumonia) which are being addressed  Known PFO  Most recent ECHO 4/15/24: Ef 65%, unable to assess DD, mild AI  2.  Hypovolemic shock  UGIB s/p EGD 4/17/24 without active bleeding. Throughout the day became hypotensive had a 3g drop of Hb. Stat CT high volume and angiogram s/p embolization of distal branches of left gastric artery requiring multiple large transfusions and IV resuscitation.   S/p DDAVP  Peripheral smear negative, TEG wnl, DIC panel   Levophed weaned off vasopressin  H&H q6hrs   Blood products prepared repeat CT high volume if concern of rebleeding     Pulm:  Hypoxemic respiratory failure s/p trach;  Hx of COVID pneumonia s/p steroids and remdesivir   Hx of recurrent bacterial pna, + stenotrophomona s/p tx with minocycline  ACVC 40%/400mL/16/6     GI:  Cecal volvulus s/p ileectomy with poor wound healing  F/up wound cultures  Esophageal and gastric ulcers, CMV/HSV negative  S/p EGD x2 with clips, epi, and cautery  Protonix 40 mg BID  Intra-abdominal abscess s/p IR drain with purulent drainage and candida albicans  See plan under ID  off zosyn, c/w IV fluconazole & micfungin   ID following  General surgery following  Elevated ammonia  D/t high dose IV fluconazole, c/w lactulose to goal 2-3 Bms per dy  5.   ABLA wound vs esophageal vs gastric ulcers  UGIB s/p EGD 4/17/24 without active bleeding. Throughout the day became hypotensive had a 3g drop of Hb. Stat CT high volume and angiogram s/p embolization of distal branches of left gastric artery requiring multiple large transfusions and IV resuscitation.   S/p DDAVP  Peripheral smear negative, TEG wnl, DIC panel   Levophed and vassopressin  H&H q6hrs   Blood products prepared     :  Uremia  D/t UGIB s/p EGD x2 with clipping, epi, and cautry  Metabolic  acidosis  S/p bicarb push  Refractory metabolic acidosis  Consider bicarb gtt  F/up lactic acid  Hypernatremia   D5W @ 75 mL/hr  Elevated Cr  Persistently off CRRT  Monitor Cr  Closely monitor UOP, goal 1.4L     F/E/N:  F: continue D5W.  E: monitor and replete for goal K>4, P>3, Mg>2  N: tube feeds with Nepro 45 cc/h, Prosource liquid protein to 1 packet, Refugio BID to support wound healing, 200 cc FWF q4h         Heme/Onc:  Pancytopenia, improving with intermittent plt/prbc's transfusion, s/p Filgastrim x3  In setting of hx of B cell lymphoma, prior chemo, Rituxan therapy, prior abx and present meds including lamictal  Hemo onc consulted, empirically given Filgastrim 300mg qd x3d (completed 3/30); IR bone marrow bx deferred by heme-onc   Lamictal switched to Vimpat in consultation with neuro as above due to potential contribution to pancytopenia  Trend CBC and diff daily, neutropenic precautions for ANC <500     2. ABLA (see plan under hypovolemic shock)  3. Epistaxis   S/p Afirin,TXA to nostril, and packing by ENT  DIC panel, peripheral smear unremarkable  Consider staph coverage       3. Thrombocytopenia  Likely multifactorial including imparied production from marrow dysfunction in setting of persistent inflammation; RI low suggestive of hyperproliferation, hx of B-cell lymphoma, recent infections including persistent COVID, PNA treated, CMV negative. No known history of vWD/congenital disorders. No liver dysfunction; recent hepatic profile unremarkable. HIT workup negative, Hemolytic workup negative. No s/s DIC. No mechanical valves.  Transfuse with leukoreduced and irradiated PLTs if count <20k due to increased risk of bleeding   ONLY transfuse Plt if <20k and actively bleeding  Hold DVT ppx if platelets <50K     4. B cell lymphoma  Follows with heme[facility]onc at [facility]  S/P 6 cycles of chemotherapy in 20222  Maintained on Rituxan for 2 year course PTA, started May 2022, last dose was 12/2024, treatment currently on  hold in setting of persistent COVID-19 infection  Anti-Ritux Ab negative     Endo:  Steroid hyperglycemia and diabetes mellitus type 2, A1c at 6.6 from 01/2024  Goal -180  Utilize SSI no insulin gtt     2. R thyroid gland enlargement  Seen on CT 4/5  Follow up US 4/5- Limited exam due to overlying tracheostomy tube and central line catheter. Heterogeneous lobular appearance to the remaining thyroid gland, nonspecific, question sequela of thyroiditis. No discrete nodule identified.   T4/TSH unremarkable   Will need follow up thyroid US sometime week of 4/15 to follow up     ID:  Fungemia   Initial cx positive 3/31 with candida albicans started on IV high dose fluoconazole. Repeat cxs positive. Found to have intra-abdominal abscess s/p IR drainage with pus and cultures growing candida and likely the source.  Added micafungin d/t persistent positive cultures.   Monitor LFTs  F/up repeat blood cultures  and soft tissue culture, NGTD  Added on meropenum d/t increased pressor requirements  Recurrent bacterial pneumonia with BAL cultures pseudomonas and stenotphomonas.   Recent severe COVID infection, s/p steroids, antivirals      MSK/Skin:  Midline incision wound with poor wound healing  S/p OR 4/4 for midline wound exploration with mucus fistula and end ileostomy, nonviable subq tissue debrided  General surgery following, wound dressing changes per surgery.     Disposition: Critical care    ICU Core Measures     Vented Patient  VAP Bundle  VAP bundle ordered     A: Assess, Prevent, and Manage Pain Has pain been assessed? Yes  Need for changes to pain regimen? NA   B: Both Spontaneous Awakening Trials (SATs) and Spontaneous Breathing Trials (SBTs) Plan to perform spontaneous awakening trial today? N/A   Plan to perform spontaneous breathing trial today? N/A   Obvious barriers to extubation? NA   C: Choice of Sedation RASS Goal: N/A patient not on sedation  Need for changes to sedation or analgesia regimen? NA   D:  Delirium CAM-ICU: Negative   E: Early Mobility  Plan for early mobility? NA   F: Family Engagement Plan for family engagement today? Yes       Antibiotic Review: Patient on appropriate coverage based on culture data. , Awaiting culture results. , and Infectious disease consulted    Review of Invasive Devices:    Ortiz Plan: Continue for accurate I/O monitoring for 48 hours  Central access plan: Medications requiring central line Hemodynamic monitoring  [place] Plan: Keep arterial line for hemodynamic monitoring    Prophylaxis:  VTE Contraindicated secondary to: GI Bleed   Stress Ulcer  covered bypantoprazole (PROTONIX) injection 40 mg [[id number]]        Significant 24hr Events     24hr events: pt to IR for angiogram s/p embolization of distal branches of left gastric artery with increased pressor support, large transfusion by IR (5 units PRBCs, 5 FFP, 1 platelet), upon returning from IR had increasing pressor requirements s/p 2 units PRBCs and one bolus of isolyte. She began to have epistaxis refractory to afrin and TXA. ENT evaluated and placed nasal packing to b/l nares.      Subjective     Review of Systems     Objective                            Vitals I/O      Most Recent Min/Max in 24hrs   Temp 97.5 °F (36.4 °C) Temp  Min: 93.9 °F (34.4 °C)  Max: 99.3 °F (37.4 °C)   Pulse (!) 120 Pulse  Min: 94  Max: 128   Resp (!) 26 Resp  Min: 14  Max: 40   BP (!) 96/49 BP  Min: 70/42  Max: 135/60   O2 Sat 99 % SpO2  Min: 96 %  Max: 100 %      Intake/Output Summary (Last 24 hours) at 4/18/2024 0710  Last data filed at 4/18/2024 0600  Gross per 24 hour   Intake 5876.03 ml   Output 1285 ml   Net 4591.03 ml       Diet NPO    Invasive Monitoring   Arterial Line  Amelia /60  No data recorded   MAP 83 mmHg  No data recorded           Physical Exam   Physical Exam  Eyes:      Pupils: Pupils are equal, round, and reactive to light.   Skin:     General: Skin is warm.   HENT:      Mouth/Throat:      Comments: Bright red blood  draining from oropharynx  Cardiovascular:      Rate and Rhythm: Regular rhythm. Tachycardia present.   Musculoskeletal:      Comments: L CFA site for embolization without hematoma or bleeding   Constitutional:       General: She is in acute distress.      Appearance: She is ill-appearing.   Pulmonary:      Effort: Pulmonary effort is normal. No accessory muscle usage, respiratory distress or accessory muscle usage.      Breath sounds: No wheezing.   Genitourinary/Anorectal:  Ortiz present.          Diagnostic Studies      EKG:   Imaging:  I have personally reviewed pertinent reports.   and I have personally reviewed pertinent films in PACS     Medications:  Scheduled PRN   chlorhexidine, 15 mL, Q12H SARTHAK  fluconazole, 800 mg, Q24H  insulin lispro, 1-6 Units, Q6H SARTHAK  lacosamide, 100 mg, Q12H  lactulose, 20 g, BID  levalbuterol, 1.25 mg, Q6H  meropenem, 1,000 mg, Q8H  micafungin, 150 mg, Q24H  multi-electrolyte, 500 mL, Once  nystatin, , BID  oxymetazoline, 2 spray, Q12H SARTHAK  pantoprazole, 40 mg, Q12H SARTHAK  sodium bicarbonate, 650 mg, BID after meals  sodium chloride, 2 spray, BID      acetaminophen, 650 mg, Q6H PRN  albuterol, 2.5 mg, Q4H PRN  ondansetron, 4 mg, Q6H PRN  sodium chloride, 1 Application, Q1H PRN       Continuous    dextrose, 75 mL/hr, Last Rate: 75 mL/hr (04/18/24 0113)  norepinephrine, 1-30 mcg/min, Last Rate: 8 mcg/min (04/18/24 0632)  vasopressin, 0.04 Units/min, Last Rate: Stopped (04/18/24 0313)         Labs:    CBC    Recent Labs     04/17/24  2356 04/18/24  0234 04/18/24  0237 04/18/24  0244 04/18/24  0428 04/18/24  0531   WBC 12.29*  --   --   --   --  13.91*   HGB 10.4*   < > 8.3*  --  8.8* 11.2*   HCT 30.5*   < > 24.4*  --  26* 33.1*   PLT 68*  --  71*  --   --  53*   BANDSPCT  --   --   --  21*  --  23*    < > = values in this interval not displayed.     BMP    Recent Labs     04/17/24  2356 04/18/24  0234 04/18/24  0428 04/18/24  0531   SODIUM 152*  --   --  152*   K 4.3  --   --  4.3   CL  118*  --   --  118*   CO2 18*   < > 19* 18*   AGAP 16*  --   --  16*   BUN 70*  --   --  68*   CREATININE 1.34*  --   --  1.27   CALCIUM 9.5  --   --  8.8    < > = values in this interval not displayed.       Coags    Recent Labs     04/18/24  0237   INR 1.49*   PTT 48*        Additional Electrolytes  Recent Labs     04/17/24  0844 04/18/24  0234 04/18/24  0428 04/18/24  0531 04/18/24  0536   MG 2.2  --   --  1.9  --    PHOS 5.8*  --   --  6.9*  --    CAIONIZED  --    < > 1.37*  --  1.31    < > = values in this interval not displayed.          Blood Gas    Recent Labs     04/17/24  2356 04/18/24  0542   PHART 7.084* 7.173*   [id number] 54.5* 46.4*   PO2ART 135.7* 118.4   [id number] 15.9* 16.7*   BEART -13.7 -11.5   SOURCE Line, Arterial  --      Recent Labs     04/17/24 2356   SOURCE Line, Arterial    LFTs  Recent Labs     04/17/24  0844 04/18/24  0531   ALT 23 22   AST 14 19   ALKPHOS 330* 154*   ALB 2.1* 2.0*   TBILI 1.36* 1.96*       Infectious  No recent results  Glucose  Recent Labs     04/17/24  1216 04/17/24  1638 04/17/24  2356 04/18/24  0531   GLUC 199* 150* 178* 185*               Susan Prince, DO

## 2024-04-18 NOTE — RESPIRATORY THERAPY NOTE
RT Ventilator Management Note  Carla Hunt 59 y.o. female MRN: [id number]  [facility]: [id number]      Daily Screen         4/17/2024  1222 4/18/2024  0811          Patient safety screen outcome:: Failed Failed (P)       Not Ready for Weaning due to:: Underline problem not resolved Underline problem not resolved (P)                 Physical Exam:   Assessment Type: (P) Assess only  General Appearance: (P) Sedated  Respiratory Pattern: (P) Assisted  Chest Assessment: (P) Chest expansion symmetrical  Bilateral Breath Sounds: (P) Diminished  R Breath Sounds: (P) Diminished  L Breath Sounds: (P) Diminished  Suction: (P) Trach  O2 Device: (P) G5      Resp Comments: (P) pt remains on SCMV mode through G5, no changes at this time, pt has danie red blood from mouth draining, RN aware, no weaning at this time, #8 antonio trach, BVM, smallerv size trach and same size trach, sx equipment, inline catheter, sterile saline at bedside, will continue to monitor

## 2024-04-18 NOTE — PROGRESS NOTES
[facility]  Progress Note  Name: Carla Hunt I  MRN: [id number]  Unit/Bed#: MICU 10 I Date of Admission: 1/10/2024   Date of Service: 4/18/2024 I Hospital Day: 99    Assessment/Plan   Goals of care, counseling/discussion  Assessment & Plan  Code Status: DNR- level 3  Decisional apparatus:  Patient is not competent on my exam today.  If competence is lost, patient's substitute decision maker would default to spouse, Regan, by [place] Act 169.  Advance Directive / Living Will / POLST:  none  Continue pressors, other minimally invasive medical optimization until more family arrives thereafter plan to transition to comfort care. Spouse is aware that prognosis is limited even with ongoing efforts.  See ACP documents on 4/8 and 4/11    Palliative care by specialist  Assessment & Plan  Provided support to family at bedside today, Spouse (Regan), brother (Shahriar), and daughter (Cathryn) present at bedside this morning.  Extended family on their way  Pastoral care following. Father Willow provided Last Rites  Palliative care is available upon grandson (Clinton's) arrival  Palliative MSW following, completed fingerprint for memorial jewelry     GIB (gastrointestinal bleeding)  Assessment & Plan  Underwent IR embolization, EGD yesterday with clots in stomach. Received multiple blood transfusions.    Pancytopenia (HCC)  Assessment & Plan  Unfortunate bleeding from stomach, nares, mouth, wound, hematuria. Received total of 12 units of PRBC, FFP, platelets.   Nasal packing in place    Fungemia  Assessment & Plan  ID following  Visible mold growing from abdominal wound. No further interventions in setting of transition to comfort care.           Interval history:       Over the past 24-48H patient has becoming increasingly unstable. Oozing from ostomy, bleeding from NGT, multiple units of blood transfusions. GI performed endoscopy and noted large amount of blood in stomach. NGT removed.  Increasing pressor requirements following trip to  for embolization. Upon dressing takedown abdominal wound found to have visible fungal growth. ENT consulted for persistent epistaxis b/l, underwent packing. Primary team discussed goals of care with patient's spouse, reviewed poor prognosis even with ongoing medical optimization. Limit of DNR set. Plan to transition to comfort  focused care upon arrival of family.     MEDICATIONS / ALLERGIES:     all current active meds have been reviewed    No Known Allergies    OBJECTIVE:    Physical Exam  Physical Exam  Constitutional:       General: She is not in acute distress.     Appearance: She is ill-appearing.   HENT:      Head: Atraumatic.      Nose:      Comments: B/l nasal packing  Eyes:      Conjunctiva/sclera: Conjunctivae normal.   Neck:      Comments: Bleeding from trach  Cardiovascular:      Rate and Rhythm: Tachycardia present.   Pulmonary:      Effort: No respiratory distress.      Comments: Ventilated via trach  Musculoskeletal:         General: Swelling present.   Skin:     General: Skin is warm and dry.   Neurological:      Comments: Intermittent eye opening but no tracking   Psychiatric:      Comments: Appears calm         Lab Results:   Results from last 7 days   Lab Units 04/18/24  0756 04/18/24  0531 04/18/24  0428 04/18/24  0244 04/18/24  0237 04/18/24  0234 04/17/24  2356 04/17/24  1216 04/17/24  0844 04/16/24  1817 04/16/24  0601 04/15/24  1733   WBC Thousand/uL  --  13.91*  --   --   --   --  12.29*  --  17.04*  --  19.45* 18.17*   HEMOGLOBIN g/dL 10.5* 11.2*  --   --  8.3*  --  10.4*   < > 8.2*   < > 8.0* 8.0*   I STAT HEMOGLOBIN g/dl  --   --  8.8*  --   --    < >  --    < >  --   --   --   --    HEMATOCRIT % 30.1* 33.1*  --   --  24.4*  --  30.5*   < > 24.9*   < > 23.7* 23.5*   HEMATOCRIT, ISTAT %  --   --  26*  --   --    < >  --    < >  --   --   --   --    PLATELETS Thousands/uL  --  53*  --   --  71*  --  68*  --  55*  --  69* 77*   MONO PCT %   --  2*  --  1*  --   --   --   --   --   --  0* 3*   EOS PCT %  --  0  --   --   --   --   --   --   --   --  1 0    < > = values in this interval not displayed.     Results from last 7 days   Lab Units 04/18/24  0531 04/18/24  0428 04/18/24  0234 04/17/24  2356 04/17/24  2212 04/17/24  2025 04/17/24  1638 04/17/24  1216 04/17/24  0844 04/16/24  1446 04/16/24  0601   POTASSIUM mmol/L 4.3  --   --  4.3  --   --  5.4*   < > 4.2   < > 3.4*   CHLORIDE mmol/L 118*  --   --  118*  --   --  120*   < > 121*   < > 117*   CO2 mmol/L 18*  --   --  18*  --   --  12*   < > 13*   < > 15*   CO2, I-STAT mmol/L  --  19* 19*  --  19*   < >  --   --   --   --   --    BUN mg/dL 68*  --   --  70*  --   --  78*   < > 78*   < > 76*   CREATININE mg/dL 1.27  --   --  1.34*  --   --  1.50*   < > 1.48*   < > 1.44*   CALCIUM mg/dL 8.8  --   --  9.5  --   --  10.2   < > 10.0   < > 10.1   ALK PHOS U/L 154*  --   --   --   --   --   --   --  330*  --  480*   ALT U/L 22  --   --   --   --   --   --   --  23  --  27   AST U/L 19  --   --   --   --   --   --   --  14  --  15   GLUCOSE, ISTAT mg/dl  --  178* 176*  --  170*   < >  --   --   --   --   --     < > = values in this interval not displayed.       Imaging Studies: reviewed pertinent studies   EKG, Pathology, and Other Studies: reviewed pertinent studies    Counseling / Coordination of Care    Total floor / unit time spent today 60+ minutes. Greater than 50% of total time was spent with the patient and / or family counseling and / or coordination of care. A description of the counseling / coordination of care: time spent from approximately 9:40-10:40 at bedside, providing support to family,     This note was not shared with the patient due to privacy exception: note includes other individuals

## 2024-04-18 NOTE — DEATH NOTE
INPATIENT DEATH NOTE  Carla Hunt 59 y.o. female MRN: [id number]  Unit/Bed#: MICU 10 Encounter: [id number]    Date, Time and Cause of Death    Date of Death: 24  Time of Death:  1:37 PM  Preliminary Cause of Death: Multi-organ failure with heart failure (HCC)  Entered by: Millicent Medrano MD[AB1.1]       Attribution       AB1.1 Millicent Medrano MD 24 13:43             Patient's Information  Pronounced by: Millicent Medrano MD  Did the patient's death occur in the ED?: No  Did the patient's death occur in the OR?: No  Did the patient's death occur less than 10 days post-op?: No  Did the patient's death occur within 24 hours of admission?: No  Was code status DNR at the time of death?: Yes    PHYSICAL EXAM:  Unresponsive to noxious stimuli, Spontaneous respirations absent, Breath sounds absent, Carotid pulse absent, Heart sounds absent, Pupillary light reflex absent, and Corneal blink reflex absent    Medical Examiner notification criteria:  NONE APPLICABLE   Medical Examiner's office notified?:  Yes   Medical Examiner accepted case?:  No  Name of Medical Examiner:     Family Notification  Was the family notified?: Yes  Date Notified: 24  Time Notified: 1340  Notified by: Millicent Medrano MD  Name of Family Notified of Death: family at bedside   Relationship to Patient: Spouse, Daughter, Other relative  Family Notification Route: At bedside  Was the family told to contact a  home?: Yes    Autopsy Options:  Post-mortem examination declined by next of kin    Primary Service Attending Physician notified?:  yes - Attending:  Deonte Mills MD    Physician/Resident responsible for completing Discharge Summary:  ICU Team

## 2024-04-18 NOTE — PROGRESS NOTES
Critical care    Patient noted to have worsening hemodynamics overnight now requiring 2 vasopressors, she was noted to have significant blood coming from her oral cavity.  Her wound was inspected this morning and there appears to be fungus around the periphery.    She has worsening acidosis now on a bicarb infusion along with Levophed and vasopressin with the addition of stress dose steroids    I discussed with her  at the bedside about the overall grim prognosis and how she may likely pass in the next few hours.  I allowed him to ask questions and addressed all his concerns.    He requests that we change her to level 3 CODE STATUS.  He will notify his additional family numbers to come in and will likely withdraw later today once family from [place] arrives.    Plan  Level 3 for now  Level 4 later today    Deonte Mills MD  Pulmonary and Critical Care Medicine

## 2024-04-18 NOTE — ANESTHESIA PROCEDURE NOTES
"Arterial Line Insertion    Performed by: Anahi Mann CRNA  Authorized by: Anahi Mann CRNA  Consent: Written consent obtained.  Risks and benefits: risks, benefits and alternatives were discussed  Consent given by: patient  Procedure consent: procedure consent matches procedure scheduled  Relevant documents: relevant documents present and verified  Test results: test results available and properly labeled  Required items: required blood products, implants, devices, and special equipment available  Patient identity confirmed: arm band  Time out: Immediately prior to procedure a \"time out\" was called to verify the correct patient, procedure, equipment, support staff and site/side marked as required.  Preparation: Patient was prepped and draped in the usual sterile fashion.  Indications: multiple ABGs and hemodynamic monitoring  Orientation:  Right  Location: radial artery  Procedure Details:  Angel's test normal: yes  Needle gauge: 20  Number of attempts: 1    Post-procedure:  Post-procedure: dressing applied  Waveform: good waveform  Post-procedure CNS: normal  Patient tolerance: patient tolerated the procedure well with no immediate complications          "

## 2024-04-18 NOTE — QUICK NOTE
Called to bedside around 2:30 am for MAP in the 40s. Patient had been on Levophed at 5 mcg/min since arriving from  and suddenly developed increased pressor requirement with Levophed at 15 mcg/min and vasopressin 0.04 units/min. I-STAT 7.10/54.9/124/17, base excess -12 and Hg 7.5 (down from 10 2 hours prior). Patient received 1 bolus of isolyte and 2 units of PRBC with decreased in pressor requirements with vasopressin off and Levophed at 2 mcg/min. Lactic acid also increasing at 3.8. DIC panel was ordered with normal fibrinogen level. TEG ordered as well and pending.     Additionally, patient is experiencing epistaxis. Per previous ENT note recommendations, Afrin was ordered and sprayed into each nostril with no improvement. TXA was ordered as well and applied to each nostril. On further exam, there is also blood pooling inside her mouth with no obvious evidence of trauma, concerning for posterior epistaxis. ENT resident contacted via TT and nasal packing done.    Addendum 5 am: Pressor requirements slowly increasing. H/H pending.    Tanika Silveira MD  Pulmonary and Critical Care Fellow, PGY-5  [facility]

## 2024-04-18 NOTE — QUICK NOTE
INTERVENTIONAL RADIOLOGY QUICK NOTE    Patient is postop day #1 gastric angiogram, embolization of distal branches arising from left gastric artery.    Overnight, patient with decompensation in hemodynamics requiring support of 2 vasopressors. Patient developed epistaxis requiring nasal packing and worsening acidosis. Within the last 24 hours, patient with multiple transfusions of blood products.  Despite attempts at medical optimization, patient continues to decline.  Per review of medical record, significant intra-abdominal wound with fungal growth.  Patient's overall prognosis, grim.  See critical care last note.    DANYELLE Duarte

## 2024-04-18 NOTE — DISCHARGE SUMMARY
Discharge Summary - Carla Hunt 59 y.o. 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 Initially in [facility] with encephalopathy 2/2 COVID. CT showed GGO concern for COVID pneumonia vs hypersensitivity pneumonitis. vEEG negative. Developed acute hypoxic respiratory failure requiring high dose steroids but couldn't wean because it would cause her respiratory status to recurrently decline, causing prolonged intubation for 3 weeks until she was eventually trach and PEG on 3/15. 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 She has had poor wound healing because of the prolonged steroids, which was then complicated by bleeding and hemorrhagic shock requiring 15 units of pRBC. Because of poor wound healing, her two abdominal surgical sites progressed to form one large abdominal wall defect, which has further been complicated by fecal contents draining into her abdomen. During this, she became progressively neutropenic which contributed to repeat pneumonia with cultures positive for Stenotrophomonas maltophilia for which she has completed several courses of minocycline per ID (as well as IV bactrim for PCP prophylaxis). Blood and urine cultures also noted to be positive for Candida albicans fungemia, for which she has been on fluconazole. Unfortunately, her mental status has declined again (after interval improvement); several MRI brains positive for septic embolic disease. TTE did not show vegetations but did show PFO and RLE Duplex revealed DVT for which  she is s/p IVC filter placement. Labs at one point showed significant uremia for which GI was consulted due to concern for upper GI bleed; EGD showed esophageal ulcerations prompting concern for CMV vs HSV esophagitis (path negative). SATURNINO (to evaluate for endocarditis given concern for septic emboli) aborted due to technical difficulties of getting probe into esophagus. Increased thyromegaly noted which has been compressing her airway; originally concerned for thyroiditis given uniform enhancement on CT vs recurrence of lymphoma, but discussed with IR and this is more likely a capsular bleed from trach insertion so no biopsy needed. Patient was at one point placed on CRRT due to concern for uremia contributing to altered mental status, which is now off with kidney function remaining stable. Repeat scans have shown evidence of small intraparenchymal hemorrhage in the L parietal lobe (likely conversion of a septic embolic focus) without significant effect. LP negative. Repeat imaging done to evaluate for source of persistent fungemia revealed intra-abdominal and estefany-ostomy fluid collections which were drained and grew very high levels of Candida. Repeat blood cultures persistently positive as of 4/12 prompting switch to micafungin on 4/14. Patient with persistently increasing BUN with intermittent drops in Hb progressing to significant oozing from ileostomy - EGD 4/13 revealed bleeding esophageal ulcer which was clipped + epi; EGD 4/14 revealed large clot in stomach which was removed and revealed another bleeding ulcer in the fundus which was clipped + epi + cautery. On 4/14/24 pt developed increased pressor support and CT highvolume showed bleeding from the gastric artery s/p embolization with IR to the distal branches of the left gastric artery. After large volume transfusion, pt developed posterior epistaxis and DIC panel, peripheral smear, and TEG was obtained and within normal limits. Unfortunately, pt continued  to need increased pressor support and shared decision with family () to pursue comfort care.      Procedures Performed:   Orders Placed This Encounter   Procedures    General procedure    Intubation    Intubation    Lumbar puncture    Lumbar puncture    POC Cardiac US    POC Cardiac US    POC Cardiac US    POC Cardiac US    POC Cardiac US    Procedural Sedation    Procedural Sedation    Procedural Sedation    Temporary HD Catheter    Wound vacum dressing application                 Significant Findings, Care, Treatment and Services Provided:   Acute blood loss anemia s/p IR emobolization of left gastric artery  Septic and hypovolemic shock d/t fungemia and ABLA s/p EGDx2 and IR embolization  Metabolic encephalopathy  Hypernatremia   Metabolic acidosis  Cerebral septic infarcts with PFO, unable to complete SATURNINO    Right lower extremity DVT status post IVC filter   Hyperammonia  Cecal volvulus status post ileectomy  with poor wound healing, malecot inserted by general surgery s/p 14 day course of Zosyn and broadened to meropenum  Candidemia with persistent positive blood cultures, IR drain in a fluid collection in the abdomen, on fluconazole and micafungin  Acute on chronic hypoxemic s/p trach  History of COVID-pneumonia treated with steroids, remdesivir  History of seizures status post left temporal lobectomy on PTA Vimpat  History of B-cell lymphoma treated with rituximab  Critical illness myopathy     Complications: as above    Disposition:      Final Diagnosis: hypovolemic and distributive/septic shock 2/2 fungemia and ABLA    Medical Problems       Resolved Problems  Date Reviewed: 2024            Resolved    Stroke-like symptoms 2024     Resolved by  Jerardo Johnson MD    Encephalopathy acute 2024     Resolved by  Scot Monsalve DO    Dysphagia 2024     Resolved by  Ro Hamilton MD    Pneumonia of both lungs due to infectious organism 2024     Resolved by   Admin Ambulatory          Date, Time and Cause of Death    Date of Death: 24  Time of Death:  1:37 PM  Preliminary Cause of Death: Multi-organ failure with heart failure (HCC)  Entered by: Millicent Medrano MD[AB1.1]       Attribution       AB1.1 Millicent Medrano MD 24 13:43            Death Note:    INPATIENT DEATH NOTE  Carla Hunt 59 y.o. female MRN: [id number]  [facility] [id number]    Date, Time and Cause of Death    Date of Death: 24  Time of Death:  1:37 PM  Preliminary Cause of Death: Multi-organ failure with heart failure (HCC)  Entered by: Millicent Medrano MD[AB1.1]       Attribution       AB1.1 Millicent Medrano MD 24 13:43             Patient's Information  Pronounced by: Millicent Medrano MD  Did the patient's death occur in the ED?: No  Did the patient's death occur in the OR?: No  Did the patient's death occur less than 10 days post-op?: No  Did the patient's death occur within 24 hours of admission?: No  Was code status DNR at the time of death?: Yes    PHYSICAL EXAM:  Unresponsive to noxious stimuli, Spontaneous respirations absent, Breath sounds absent, Carotid pulse absent, Heart sounds absent, Pupillary light reflex absent, and Corneal blink reflex absent    Medical Examiner notification criteria:  NONE APPLICABLE   Medical Examiner's office notified?:  Yes   Medical Examiner accepted case?:  No  Name of Medical Examiner:     Family Notification  Was the family notified?: Yes  Date Notified: 24  Time Notified: 1340  Notified by: Millicent Medrano MD  Name of Family Notified of Death: family at bedside   Relationship to Patient: Spouse, Daughter, Other relative  Family Notification Route: At bedside  Was the family told to contact a  home?: Yes    Autopsy Options:  Post-mortem examination declined by next of kin    Primary Service Attending Physician notified?:  yes - Attending:  Deonte Mills  MD    Physician/Resident responsible for completing Discharge Summary:  ICU Team           Attestation signed by Deonte Mills MD at 4/18/2024  1:47 PM:  I was present for the exam

## 2024-04-18 NOTE — ASSESSMENT & PLAN NOTE
Provided support to family at bedside today, Spouse (Regan), brother (Shahriar), and daughter (Cathryn) present at bedside this morning.  Extended family on their way  Pastoral care following. Father Willow provided Last Rites  Palliative care is available upon grandson (Clinton's) arrival  Palliative MSW following, completed fingerprint for memorial jewelry

## 2024-04-18 NOTE — PROGRESS NOTES
Progress Note - General Surgery   Carla Hunt 59 y.o. female MRN: [id number]  Unit/Bed#: MICU 10 Encounter: [id number]    Assessment:  59F with COVID/strep pna, B cell lymphoma on rituxumab, CKD; prolonged hospitalization 2/2 acute hypoxic respiratory failure s/p MICU bedside trach, hospitalization complicated by cecal volvulus s/p ileocectomy, mucus fistula, end ileostomy on 3/13, anticipated poor wound healing on prolonged high dose steroids and neutropenia; candidemia  S/p Or 4/4 for midline wound exploration with mucus fistula and end ileostomy viable appearing and above the fascia, noted nonviable subq tissue debrided and skin bridge between ostomy and midline incision removed, small area of exposed bowel protected and stoma isolated  Course complicated by pelvic abscess s/p IR drain placement on 4/10 and persistent candidemia   DVT with presumed paradoxical emboli  Multiple embolic strokes  S/p aborted SATURNINO 4/11  UGI bleed s/p EGD x 2 with esophageal and gastric ulcers, now with recurrent GI bleeding and hemorrhagic shock    Over the course of past 24hr patient had onset on bloody NGT output, also epistaxis, drop in Hb w ongoing transfusion requirements, bedside EGD was done w clotting blood seen in stomach, CT imaging obtained w active extrav om the posterior wall of the stomach, she was taken to IR for embolization of a superior distal branch of the L gastric artery, noted to be acidotic w continued pressor requirements during and after all of this, also ENT eval for nasal packing in setting of epistaxis    Afebrile, tachy to 120s, hypotensive episodes down to 70s, on levo of 6 and vaso currently, S /16/40/6    WBC 13.91 from 12.29  Hb 11.2 - 6u pRNC, 3 FFP, 1 pooled plt overnight   LA 4 from 3.8  Cr 1.27 from 1.34  ABG 7.17/46.4/118.4/16.7 w BE -11.5       Ostomy 450    Plan:  -will continue to do dressing changes for abdominal wound, malecot to divert stool output from ostomy into  "marie  -GI on board however it does not seem there are any plans for further EGD given multiple failed attempts  -appreciate IR recs  -ongoing resuscitation, transfusion as needed  -wean pressors as tolerated  -ID on board for abx recs  -palliative on board, goals of care discussions ongoing  -appreciate ICU care    Subjective/Objective   Subjective:   Intubated and sedated, events as above    Objective:     Blood pressure (!) 96/49, pulse (!) 120, temperature 97.5 °F (36.4 °C), resp. rate (!) 26, height 5' 8\" (1.727 m), weight 92.2 kg (203 lb 4.2 oz), SpO2 99%.,Body mass index is 30.91 kg/m².      Intake/Output Summary (Last 24 hours) at 4/18/2024 0702  Last data filed at 4/18/2024 0600  Gross per 24 hour   Intake 5726.03 ml   Output 1225 ml   Net 4501.03 ml       Invasive Devices       Central Venous Catheter Line  Duration             CVC Central Lines 04/17/24 Triple Right Internal jugular <1 day              Peripheral Intravenous Line  Duration             Long-Dwell Peripheral IV (Midline) 04/13/24 Right Basilic 4 days    Peripheral IV 04/17/24 Left Antecubital <1 day              Arterial Line  Duration             Arterial Line 04/17/24 Right Radial <1 day              Drain  Duration             Ileostomy RUQ 57 days    Urethral Catheter Three way 18 Fr. 17 days    Abscess Drain Buttock 7 days              Airway  Duration             Surgical Airway Shiley Cuffed 36 days                    Physical Exam:   General: ill-appearing  Skin: Warm, dry, anicteric  HEENT: Normocephalic, atraumatic  CV: tachy , no m/r/g  Pulm: CTA b/l, no inc WOB, vent as above, trach  Abd: Soft, ND/NT, dressing over wound, malecot from ostomy draining stool   MSK: Symmetric, no edema, no tenderness, no deformity  Neuro: does not follow commands, sedated,     Lab, Imaging and other studies:I have personally reviewed pertinent lab results.  , CBC:   Lab Results   Component Value Date    WBC 13.91 (H) 04/18/2024    HGB 10.5 (L) " 04/18/2024    HCT 30.1 (L) 04/18/2024    MCV 87 04/18/2024    PLT 53 (L) 04/18/2024    RBC 3.79 (L) 04/18/2024    MCH 29.6 04/18/2024    MCHC 33.8 04/18/2024    RDW 14.7 04/18/2024    MPV 12.0 04/18/2024    NRBC 26 (H) 04/18/2024   , CMP:   Lab Results   Component Value Date    SODIUM 152 (H) 04/18/2024    K 4.3 04/18/2024     (H) 04/18/2024    CO2 18 (L) 04/18/2024    CO2 19 (L) 04/18/2024    BUN 68 (H) 04/18/2024    CREATININE 1.27 04/18/2024    GLUCOSE 178 (H) 04/18/2024    CALCIUM 8.8 04/18/2024    AST 19 04/18/2024    ALT 22 04/18/2024    ALKPHOS 154 (H) 04/18/2024    EGFR 46 04/18/2024   , Coagulation:   Lab Results   Component Value Date    INR 1.49 (H) 04/18/2024     VTE Pharmacologic Prophylaxis: Heparin  VTE Mechanical Prophylaxis: sequential compression device

## 2024-04-18 NOTE — ADDENDUM NOTE
Addendum  created 04/18/24 1424 by Anahi Mann CRNA    Intraprocedure Blood edited, Intraprocedure Event edited

## 2024-04-19 LAB
ABO GROUP BLD BPU: NORMAL
BPU ID: NORMAL
CROSSMATCH: NORMAL
CROSSMATCH: NORMAL
UNIT DISPENSE STATUS: NORMAL
UNIT PRODUCT CODE: NORMAL
UNIT PRODUCT VOLUME: 250 ML
UNIT PRODUCT VOLUME: 280 ML
UNIT PRODUCT VOLUME: 280 ML
UNIT PRODUCT VOLUME: 350 ML
UNIT RH: NORMAL

## 2024-04-19 NOTE — UTILIZATION REVIEW
NOTIFICATION OF ADMISSION DISCHARGE   This is a Notification of Discharge from [facility]. Please be advised that this patient has been discharge from our facility. Below you will find the admission and discharge date and time including the patient’s disposition.   UTILIZATION REVIEW CONTACT:  Yumiko Concepcion  Utilization   Network Utilization Review Department  Phone: [phone] x carefully listen to the prompts. All voicemails are confidential.  Email: [email or web address]     ADMISSION INFORMATION  PRESENTATION DATE: 1/10/2024  7:41 PM  OBERVATION ADMISSION DATE:   INPATIENT ADMISSION DATE: 1/10/24  7:41 PM   DISCHARGE DATE: 2024  5:11 PM   DISPOSITION:    Network Utilization Review Department  ATTENTION: Please call with any questions or concerns to [phone] and carefully listen to the prompts so that you are directed to the right person. All voicemails are confidential.   For Discharge needs, contact Care Management DC Support Team at [phone] opt. 2  Send all requests for admission clinical reviews, approved or denied determinations and any other requests to dedicated fax number below belonging to the campus where the patient is receiving treatment. List of dedicated fax numbers for the Facilities:  FACILITY NAME UR FAX NUMBER   ADMISSION DENIALS (Administrative/Medical Necessity) [phone]   DISCHARGE SUPPORT TEAM ([facility]) [phone]   PARENT CHILD HEALTH (Maternity/NICU/Pediatrics) [phone]   [facility] [phone]   [facility] [phone]   [facility] [phone]   [facility] [phone]   [facility] [phone]   [facility] [phone]   [facility] [phone]   [facility] [phone]   [facility]  [facility] [phone]   [facility] [phone]   [facility] [phone]   [facility] [phone]

## 2024-04-20 LAB
BACTERIA BLD CULT: ABNORMAL
GRAM STN SPEC: ABNORMAL

## 2024-04-21 LAB
BACTERIA BLD CULT: ABNORMAL
BACTERIA BLD CULT: NORMAL
BACTERIA BLD CULT: NORMAL
GRAM STN SPEC: ABNORMAL

## 2024-04-22 LAB — FUNGUS SPEC CULT: NORMAL

## 2024-04-23 LAB
BACTERIA TISS AEROBE CULT: ABNORMAL
BACTERIA TISS AEROBE CULT: ABNORMAL
GRAM STN SPEC: ABNORMAL
GRAM STN SPEC: ABNORMAL
MYCOBACTERIUM SPEC CULT: NORMAL
RHODAMINE-AURAMINE STN SPEC: NORMAL

## 2024-05-06 LAB — FUNGUS SPEC CULT: NORMAL

## 2024-05-07 LAB
MYCOBACTERIUM SPEC CULT: NORMAL
RHODAMINE-AURAMINE STN SPEC: NORMAL

## 2024-05-13 LAB — FUNGUS SPEC CULT: NORMAL

## 2024-05-14 LAB
MYCOBACTERIUM SPEC CULT: NORMAL
RHODAMINE-AURAMINE STN SPEC: NORMAL

## 2024-05-21 LAB
MYCOBACTERIUM SPEC CULT: NORMAL
RHODAMINE-AURAMINE STN SPEC: NORMAL

## 2024-05-28 LAB
MYCOBACTERIUM SPEC CULT: NORMAL
RHODAMINE-AURAMINE STN SPEC: NORMAL

## 2024-10-10 PROCEDURE — XW033H5 INTRODUCTION OF TOCILIZUMAB INTO PERIPHERAL VEIN, PERCUTANEOUS APPROACH, NEW TECHNOLOGY GROUP 5: ICD-10-PCS | Performed by: STUDENT IN AN ORGANIZED HEALTH CARE EDUCATION/TRAINING PROGRAM

## 2024-10-29 NOTE — ASSESSMENT & PLAN NOTE
Multi-specialty team treating individual factors that may contribute towards AMS. Provigil held at this time.  Identified to have fungemia, treatment started. CRRT initiated on 4/4, awaiting any improvement in mental status. If not, primary considering MRI  Unfortunately, Carla remains encephalopathic, not interactive during time of encounter.   Physical Therapy  Physical Therapy Treatment    Name: Gabi Madrigal  : 1939  MRN: [id number]      Date of Service: 10/29/2024    Evaluating PT:  Desiree Mills PT, DPT [id number]    Room #:  5421/5421-A  Diagnosis:  Effusion of right knee [M25.461]  Infected hardware in right leg, initial encounter (Formerly [facility]) [T84.7XXA]  Effusion, right knee [M25.461]  PMHx/PSHx:   has a past medical history of Acquired hypothyroidism, Arthritis, Blood transfusion reaction, Chronic kidney disease, History of blood transfusion, Hypertension, Lymphedema of both lower extremities, Open wound of left great toe, Other disorders of kidney and ureter in diseases classified elsewhere, Pelvic fracture (Former[facility]), Positive culture findings in wound, Skin ulcer of left calf with fat layer exposed ([facility]), Skin ulcer of left calf, limited to breakdown of skin ([facility]), Ulcer of left lower leg ([facility]ly [facility]), and Venous stasis ulcer of left calf limited to breakdown of skin (Formerly [facility]).   has a past surgical history that includes fracture surgery (); Tonsillectomy; Hip fracture surgery (Left, 2014); Colonoscopy; Endoscopy, colon, diagnostic; Total hip arthroplasty (Left, 10/17/2014); eye surgery; Femur Surgery (Right, 2024); and knee surgery (Right, 10/19/2024).  Procedure/Surgery:  Irrigation and debridement of R distal femur (10/19/24); Sacral wound debridement (10/23/2024)  Precautions:  Fall risk, alarms, NWB RLE, hinged knee brace (20-30 degrees), sacral wound, incontinent, camacho catheter  Equipment Needs:  TBD    SUBJECTIVE:    Pt lives alone in a 2 story home with 2 steps to enter and 2 rail(s). Pt has a first-floor setup. Pt ambulated short distances PTA. She does report spending most of her time in her power versus manual WC. Pt owns a FWW, a SPC, a powered WC, and a manual WC.    OBJECTIVE:   Initial Evaluation  Date: 10/22/24 Treatment Date: 10/29/24 Short Term/ Long Term   Goals   AM-PAC 6 Clicks     Was pt agreeable to  current POC.      Time in  0841  Time out  0905    Total Treatment Time  24 minutes     CPT codes:  [] Gait training 87051 - minutes  [] Manual therapy 31026 - minutes  [x] Therapeutic activities 19608 24 minutes  [] Therapeutic exercises 12985 - minutes  [] Neuromuscular reeducation 15769 - minutes    Nuris Guido PT, DPT  [id number]

## 2025-02-10 NOTE — PROGRESS NOTES
PULMONOLOGY FOLLOW UP NOTE     Name: Carla Hunt   Age & Sex: 59 y.o. female   MRN: [id number]  Unit/Bed#: [facility] 722-01   Encounter: [id number]    Assessment:   Acute hypoxic respiratory failure   COVID with possible COVID pneumonitis   Stenotrophomonas pneumonia   Stage IV B cell lymphoma on Rituxan   Epilepsy   CKD stage 3     Plan:  Acute hypoxic respiratory failure   Currently requiring 6-10L MFNC, Continues to improve.   - Will need to further wean supplemental O2 as tolerated. Patient will most likely require O2 supplementation at baseline due to extensive fibrotic disease of the lungs     COVID with possible pneumonia vs pneumonitis   CT Chest shows extensive fibrosis and ground glass opacities, bronchiectasis consistent with COVID and possible underlying infectious process or fibrosis in the setting of rituxan. Etiology at this time is unclear.   - Starting prednisone 80mg daily. Will taper by 10mg every week  - Due to prolonged steroid will start PJP ppx (bactrim)  - Will give additional dose of furosemide 40mg today   - Strict I/Os    Stenotrophomonas pneumonia  Bronchial culture on 2/1 showed stenotrophomonas growing.  Now status post minocycline and Bactrim x 5 days.   -Sputum culture ordered     Stage IV B cell lymphoma on Rituxan   Hx of B cell lymphoma with last dose of rituxan in December. Rituxan is known to cause lung fibrosis. CT Chest shows evidence of fibrosis that may have been present before admission. Unclear etiology of lung disease at this time, but appears multifactorial with drug induced fibrosis, covid induced inflammation and possible bacterial infection. Bronchoscopy showed no evidence of malignancy on cytology at this time.   - Will continue to hold off rituxan given inpatient admission at this time       Subjective:   Patient feeling well, was able to walk a little this morning around the room. Did desaturate to 70s requiring increase in oxygen but came back up with  Please offer stress test for any available 11:30 am RN time, 10:15 am Injection time appt for this week. Thank you.   rest. Currently on 8L MFNC. Denies productive cough at this time. Did endorse increase in urination after lasix.     Review of systems:  12 point review of systems was completed and was otherwise negative except as listed in HPI.      Historical Information   Past Medical History:   Diagnosis Date    Hx of hypercalcemia     Lymphoma (HCC) 2021    Parathyroid disease (HCC)     Seizures (HCC)     Situational depression     24 yr old son  from drug overdose     Past Surgical History:   Procedure Laterality Date    CRANIOTOMY FOR TEMPORAL LOBECTOMY Left      Family History   Problem Relation Age of Onset    Diabetes Mother     Cancer Father        Social History    Social History:   Social History     Socioeconomic History    Marital status: /Civil Union     Spouse name: Not on file    Number of children: Not on file    Years of education: Not on file    Highest education level: Not on file   Occupational History    Not on file   Tobacco Use    Smoking status: Never    Smokeless tobacco: Never   Vaping Use    Vaping status: Never Used   Substance and Sexual Activity    Alcohol use: No    Drug use: Never    Sexual activity: Not on file   Other Topics Concern    Not on file   Social History Narrative    Not on file     Social Determinants of Health     Financial Resource Strain: Not on file   Food Insecurity: No Food Insecurity (2024)    Hunger Vital Sign     Worried About Running Out of Food in the Last Year: Never true     Ran Out of Food in the Last Year: Never true   Transportation Needs: No Transportation Needs (2024)    PRAPARE - Transportation     Lack of Transportation (Medical): No     Lack of Transportation (Non-Medical): No   Physical Activity: Not on file   Stress: Not on file   Social Connections: Not on file   Intimate Partner Violence: Not on file   Housing Stability: Low Risk  (2024)    Housing Stability Vital Sign     Unable to Pay for Housing in the Last Year: No      Number of Places Lived in the Last Year: 1     Unstable Housing in the Last Year: No       Occupational History: n/a    Meds/Allergies   Current Facility-Administered Medications   Medication Dose Route Frequency    acetaminophen (TYLENOL) tablet 650 mg  650 mg Oral Q6H PRN    bisacodyl (DULCOLAX) rectal suppository 10 mg  10 mg Rectal Daily PRN    calcium carbonate (TUMS) chewable tablet 500 mg  500 mg Oral BID PRN    enoxaparin (LOVENOX) subcutaneous injection 40 mg  40 mg Subcutaneous Daily    insulin lispro (HumaLOG) 100 units/mL subcutaneous injection 1-5 Units  1-5 Units Subcutaneous 4x Daily (AC & HS)    lamoTRIgine (LaMICtal) tablet 150 mg  150 mg Oral BID    melatonin tablet 6 mg  6 mg Oral HS    metoprolol (LOPRESSOR) injection 2.5 mg  2.5 mg Intravenous Q6H PRN    nystatin (MYCOSTATIN) oral suspension 500,000 Units  500,000 Units Swish & Spit 4x Daily    ondansetron (ZOFRAN) injection 4 mg  4 mg Intravenous Q6H PRN    polyethylene glycol (MIRALAX) packet 17 g  17 g Oral Daily    predniSONE tablet 80 mg  80 mg Oral Daily    QUEtiapine (SEROquel) tablet 12.5 mg  12.5 mg Oral HS    senna-docusate sodium (SENOKOT S) 8.6-50 mg per tablet 2 tablet  2 tablet Oral BID PRN    sodium chloride (OCEAN) 0.65 % nasal spray 1 spray  1 spray Each Nare Q1H PRN    topiramate (TOPAMAX) tablet 100 mg  100 mg Oral BID    venlafaxine (EFFEXOR) tablet 25 mg  25 mg Oral TID With Meals     Medications Prior to Admission   Medication    busPIRone (BUSPAR) 5 mg tablet    escitalopram (LEXAPRO) 10 mg tablet    ibuprofen (MOTRIN) 600 mg tablet    lamoTRIgine (LaMICtal) 200 MG tablet    lamoTRIgine (LaMICtal) 200 MG tablet    medroxyPROGESTERone acetate (DEPO-PROVERA SYRINGE) 150 mg/mL injection    ondansetron (ZOFRAN-ODT) 4 mg disintegrating tablet    topiramate (TOPAMAX) 100 mg tablet    topiramate (TOPAMAX) 100 mg tablet    venlafaxine (EFFEXOR-XR) 75 mg 24 hr capsule     No Known Allergies    Objective    Vitals: Blood pressure  "115/69, pulse 101, temperature 98.3 °F (36.8 °C), temperature source Oral, resp. rate (!) 23, height 5' 8\" (1.727 m), weight 74.8 kg (164 lb 14.5 oz), SpO2 99%., Body mass index is 25.07 kg/m².      Intake/Output Summary (Last 24 hours) at 2/8/2024 1119  Last data filed at 2/8/2024 0838  Gross per 24 hour   Intake 360 ml   Output 1525 ml   Net -1165 ml       Physical Exam  Vitals and nursing note reviewed.   Constitutional:       General: She is not in acute distress.     Appearance: She is well-developed.   HENT:      Head: Normocephalic and atraumatic.   Eyes:      Conjunctiva/sclera: Conjunctivae normal.   Cardiovascular:      Rate and Rhythm: Normal rate and regular rhythm.      Heart sounds: No murmur heard.  Pulmonary:      Effort: Pulmonary effort is normal. No respiratory distress.      Comments: Bilateral rales present in the bases   Abdominal:      Palpations: Abdomen is soft.      Tenderness: There is no abdominal tenderness.   Musculoskeletal:         General: No swelling.      Cervical back: Neck supple.      Comments: Trade edema present bilaterally    Skin:     General: Skin is warm and dry.      Capillary Refill: Capillary refill takes less than 2 seconds.   Neurological:      Mental Status: She is alert.   Psychiatric:         Mood and Affect: Mood normal.         Labs: I have personally reviewed pertinent lab results.  Laboratory and Diagnostics  Results from last 7 days   Lab Units 02/08/24  0504 02/06/24  0454 02/04/24  0559 02/03/24  0446 02/02/24  0706 02/01/24  1447   WBC Thousand/uL 8.40 5.07 5.92 8.31 6.75 10.98*   HEMOGLOBIN g/dL 11.5 11.5 11.9 13.6 13.3 13.7   HEMATOCRIT % 35.8 36.3 36.6 41.9 41.9 43.1   PLATELETS Thousands/uL 169 162 195 240 247 240   BANDS PCT %  --  9*  --   --   --   --    MONO PCT %  --  2*  --   --   --  2*   EOS PCT %  --  0  --   --   --  0     Results from last 7 days   Lab Units 02/08/24  0504 02/06/24  0454 02/04/24  0559 02/03/24  0446 02/02/24  0706 " "02/01/24  1447   SODIUM mmol/L 143 143 140 137 140 136   POTASSIUM mmol/L 3.7 4.3 4.6 5.0 4.9 5.0   CHLORIDE mmol/L 107 112* 110* 109* 109* 105   CO2 mmol/L 30 23 21 18* 20* 21   ANION GAP mmol/L 6 8 9 10 11 10   BUN mg/dL 32* 29* 40* 47* 44* 53*   CREATININE mg/dL 1.08 0.99 1.12 1.35* 1.26 1.45*   CALCIUM mg/dL 9.4 9.5 9.8 10.1 10.0 10.3*   GLUCOSE RANDOM mg/dL 122 158* 72 93 81 137     Results from last 7 days   Lab Units 02/06/24  0454 02/04/24  0559 02/03/24  0446 02/02/24  0706 02/01/24  1447   MAGNESIUM mg/dL 1.9 1.9 2.0 2.0 2.1   PHOSPHORUS mg/dL 3.7 3.2 3.5 4.0 4.5                                         ABG:       Micro:  Results from last 7 days   Lab Units 02/02/24  0941 02/02/24  0940 02/02/24  0938   GRAM STAIN RESULT  1+ Polys  No bacteria seen No Polys or Bacteria seen Rare Polys  No organisms seen       Imaging and other studies: I have personally reviewed pertinent reports.      Pulmonary function testing:     EKG, Pathology, and Other Studies: I have personally reviewed pertinent reports.             Code Status: Level 1 - Full Code    VTE Pharmacologic Prophylaxis: Enoxaparin (Lovenox)  VTE Mechanical Prophylaxis: sequential compression device    Disclaimer: Portions of the record may have been created with voice recognition software. Occasional wrong word or \"sound a like\" substitutions may have occurred due to the inherent limitations of voice recognition software. Careful consideration should be taken to recognize, using context, where substitutions have occurred.    Ryanne Lerma MD        "

## 2025-06-20 NOTE — ASSESSMENT & PLAN NOTE
Dysphagia noted on admission, resolved.  Evaluation by speech and okay for regular diet   Detail Level: Simple Detail Level: Detailed

## 2025-07-18 NOTE — RESTORATIVE TECHNICIAN NOTE
Restorative Technician Note      Patient Name: Carla Hunt     Note Type: Mobility  Patient Position Upon Consult: Supine  Activity Performed: Ambulated; Dangled; Stood  Assistive Device: Other (Comment) (none, NC O2 on 8-15L for ambulation. Respiratory present for ambulation. Nursing aware,)  Education Provided: Yes  Patient Position at End of Consult: Bedside chair; All needs within reach; Bed/Chair alarm activated    Héctor LAYNE, Restorative Technician,              Tammy Cee(Resident)

## (undated) DEVICE — ANTIBACTERIAL UNDYED BRAIDED (POLYGLACTIN 910), SYNTHETIC ABSORBABLE SUTURE: Brand: COATED VICRYL

## (undated) DEVICE — TIBURON SPLIT SHEET: Brand: CONVERTORS

## (undated) DEVICE — INTENDED FOR TISSUE SEPARATION, AND OTHER PROCEDURES THAT REQUIRE A SHARP SURGICAL BLADE TO PUNCTURE OR CUT.: Brand: BARD-PARKER SAFETY BLADES SIZE 11, STERILE

## (undated) DEVICE — SUT PDS II 1 CTX-B 36 IN ZB371

## (undated) DEVICE — DRESSING MEPILEX AG BORDER POST-OP 4 X 8 IN

## (undated) DEVICE — CURITY NON-ADHERENT STRIPS: Brand: CURITY

## (undated) DEVICE — PROXIMATE RELOADABLE LINEAR CUTTER WITH SAFETY LOCK-OUT, 75MM: Brand: PROXIMATE

## (undated) DEVICE — PREMIUM DRY TRAY LF: Brand: MEDLINE INDUSTRIES, INC.

## (undated) DEVICE — PACK PBDS LAP CHOLE RF

## (undated) DEVICE — SUT VICRYL PLUS 0 UR-6 27IN VCP603H

## (undated) DEVICE — ENSEAL TISSUE SEALER G2 CURVED JAW FOR USE WITH G2 GENERATOR 5MM DIAMETER 25CM SHAFT LENGTH: Brand: ENSEAL

## (undated) DEVICE — ADHESIVE SKIN HIGH VISCOSITY EXOFIN 1ML

## (undated) DEVICE — SUT SILK 3-0 SH CR/8 18 IN C013D

## (undated) DEVICE — PLUMEPEN PRO 10FT

## (undated) DEVICE — PROXIMATE LINEAR CUTTER RELOAD, BLUE, 75MM: Brand: PROXIMATE

## (undated) DEVICE — GLOVE SRG BIOGEL 7

## (undated) DEVICE — GLOVE INDICATOR UNDERGLOVE SZ 6 BLUE

## (undated) DEVICE — BULB SYRINGE,IRRIGATION WITH PROTECTIVE CAP: Brand: DOVER

## (undated) DEVICE — GLOVE SRG BIOGEL 6

## (undated) DEVICE — 3000CC GUARDIAN II: Brand: GUARDIAN

## (undated) DEVICE — ABDOMINAL PAD: Brand: DERMACEA

## (undated) DEVICE — GLOVE INDICATOR PI UNDERGLOVE SZ 7.5 BLUE

## (undated) DEVICE — SPONGE LAP 18 X 18 IN STRL RFD

## (undated) DEVICE — PAD GROUNDING DUAL ADULT

## (undated) DEVICE — SUT MONOCRYL 4-0 PS-2 18 IN Y496G

## (undated) DEVICE — BETHLEHEM UNIVERSAL OUTPATIENT: Brand: CARDINAL HEALTH

## (undated) DEVICE — KERLIX BANDAGE ROLL: Brand: KERLIX

## (undated) DEVICE — TROCAR: Brand: KII FIOS FIRST ENTRY

## (undated) DEVICE — METZENBAUM ADTEC SINGLE USE DISSECTING SCISSORS, SHAFT ONLY, MONOPOLAR, CURVED TO LEFT, WORKING LENGTH: 12 1/4", (310 MM), DIAM. 5 MM, INSULATED, DOUBLE ACTION, STERILE, DISPOSABLE, PACKAGE OF 10 PIECES: Brand: AESCULAP

## (undated) DEVICE — CHLORAPREP HI-LITE 26ML ORANGE

## (undated) DEVICE — SUT SILK 2-0 30 IN A305H

## (undated) DEVICE — INSUFFLATION NEEDLE TO ESTABLISH PNEUMOPERITONEUM.: Brand: INSUFFLATION NEEDLE